# Patient Record
Sex: FEMALE | Race: WHITE | NOT HISPANIC OR LATINO | Employment: OTHER | URBAN - METROPOLITAN AREA
[De-identification: names, ages, dates, MRNs, and addresses within clinical notes are randomized per-mention and may not be internally consistent; named-entity substitution may affect disease eponyms.]

---

## 2017-01-04 ENCOUNTER — TELEPHONE (OUTPATIENT)
Dept: ENDOCRINOLOGY | Facility: CLINIC | Age: 77
End: 2017-01-04

## 2017-01-04 NOTE — TELEPHONE ENCOUNTER
Left message authorizing refill on patient medication glimepiride dispense 180 with 3 refills also informed patient that medication has been authorized

## 2017-01-09 ENCOUNTER — CLINICAL SUPPORT (OUTPATIENT)
Dept: ELECTROPHYSIOLOGY | Facility: CLINIC | Age: 77
End: 2017-01-09
Payer: MEDICARE

## 2017-01-09 DIAGNOSIS — Z95.810 ICD (IMPLANTABLE CARDIOVERTER-DEFIBRILLATOR) IN PLACE: ICD-10-CM

## 2017-01-09 DIAGNOSIS — I42.9 CARDIOMYOPATHY, PRIMARY: ICD-10-CM

## 2017-01-09 PROCEDURE — 93295 DEV INTERROG REMOTE 1/2/MLT: CPT | Mod: S$GLB,,, | Performed by: INTERNAL MEDICINE

## 2017-01-09 PROCEDURE — 93297 REM INTERROG DEV EVAL ICPMS: CPT | Mod: S$GLB,,, | Performed by: INTERNAL MEDICINE

## 2017-01-09 PROCEDURE — 93296 REM INTERROG EVL PM/IDS: CPT | Mod: S$GLB,,, | Performed by: INTERNAL MEDICINE

## 2017-01-12 DIAGNOSIS — I42.8 NONISCHEMIC CARDIOMYOPATHY: ICD-10-CM

## 2017-01-12 DIAGNOSIS — Z95.810 IMPLANTABLE CARDIOVERTER-DEFIBRILLATOR (ICD) IN SITU: ICD-10-CM

## 2017-01-12 DIAGNOSIS — I50.22 CHRONIC SYSTOLIC HEART FAILURE: ICD-10-CM

## 2017-01-12 DIAGNOSIS — Z95.810 AUTOMATIC IMPLANTABLE CARDIAC DEFIBRILLATOR IN SITU: Primary | ICD-10-CM

## 2017-01-12 DIAGNOSIS — I44.7 LBBB (LEFT BUNDLE BRANCH BLOCK): ICD-10-CM

## 2017-01-19 ENCOUNTER — LAB VISIT (OUTPATIENT)
Dept: LAB | Facility: HOSPITAL | Age: 77
End: 2017-01-19
Attending: INTERNAL MEDICINE
Payer: MEDICARE

## 2017-01-19 DIAGNOSIS — I10 ESSENTIAL HYPERTENSION: ICD-10-CM

## 2017-01-19 DIAGNOSIS — E78.5 HYPERLIPIDEMIA, UNSPECIFIED HYPERLIPIDEMIA TYPE: ICD-10-CM

## 2017-01-19 DIAGNOSIS — E04.2 NONTOXIC MULTINODULAR GOITER: ICD-10-CM

## 2017-01-19 DIAGNOSIS — M85.80 SENILE OSTEOPENIA: ICD-10-CM

## 2017-01-19 LAB
ALBUMIN SERPL BCP-MCNC: 3.7 G/DL
ALP SERPL-CCNC: 52 U/L
ALT SERPL W/O P-5'-P-CCNC: 28 U/L
ANION GAP SERPL CALC-SCNC: 7 MMOL/L
AST SERPL-CCNC: 26 U/L
BILIRUB SERPL-MCNC: 0.3 MG/DL
BUN SERPL-MCNC: 31 MG/DL
CALCIUM SERPL-MCNC: 9.6 MG/DL
CHLORIDE SERPL-SCNC: 105 MMOL/L
CO2 SERPL-SCNC: 27 MMOL/L
CREAT SERPL-MCNC: 1.2 MG/DL
EST. GFR  (AFRICAN AMERICAN): 50.4 ML/MIN/1.73 M^2
EST. GFR  (NON AFRICAN AMERICAN): 43.7 ML/MIN/1.73 M^2
GLUCOSE SERPL-MCNC: 88 MG/DL
POTASSIUM SERPL-SCNC: 4.3 MMOL/L
PROT SERPL-MCNC: 6.9 G/DL
SODIUM SERPL-SCNC: 139 MMOL/L
TSH SERPL DL<=0.005 MIU/L-ACNC: 0.74 UIU/ML

## 2017-01-19 PROCEDURE — 84443 ASSAY THYROID STIM HORMONE: CPT

## 2017-01-19 PROCEDURE — 83036 HEMOGLOBIN GLYCOSYLATED A1C: CPT

## 2017-01-19 PROCEDURE — 36415 COLL VENOUS BLD VENIPUNCTURE: CPT | Mod: PO

## 2017-01-19 PROCEDURE — 80053 COMPREHEN METABOLIC PANEL: CPT

## 2017-01-20 LAB
ESTIMATED AVG GLUCOSE: 194 MG/DL
HBA1C MFR BLD HPLC: 8.4 %

## 2017-01-26 ENCOUNTER — OFFICE VISIT (OUTPATIENT)
Dept: ENDOCRINOLOGY | Facility: CLINIC | Age: 77
End: 2017-01-26
Payer: MEDICARE

## 2017-01-26 VITALS
HEIGHT: 63 IN | DIASTOLIC BLOOD PRESSURE: 77 MMHG | WEIGHT: 165 LBS | SYSTOLIC BLOOD PRESSURE: 173 MMHG | HEART RATE: 85 BPM | BODY MASS INDEX: 29.23 KG/M2

## 2017-01-26 DIAGNOSIS — E11.65 UNCONTROLLED TYPE 2 DIABETES MELLITUS WITH OTHER NEUROLOGIC COMPLICATION, UNSPECIFIED LONG TERM INSULIN USE STATUS: ICD-10-CM

## 2017-01-26 DIAGNOSIS — E11.29 TYPE 2 DIABETES MELLITUS WITH MICROALBUMINURIA, WITH LONG-TERM CURRENT USE OF INSULIN: ICD-10-CM

## 2017-01-26 DIAGNOSIS — E78.00 PURE HYPERCHOLESTEROLEMIA: ICD-10-CM

## 2017-01-26 DIAGNOSIS — E04.2 NONTOXIC MULTINODULAR GOITER: ICD-10-CM

## 2017-01-26 DIAGNOSIS — G47.33 OSA (OBSTRUCTIVE SLEEP APNEA): ICD-10-CM

## 2017-01-26 DIAGNOSIS — I10 ESSENTIAL HYPERTENSION: ICD-10-CM

## 2017-01-26 DIAGNOSIS — Z79.4 TYPE 2 DIABETES MELLITUS WITH MICROALBUMINURIA, WITH LONG-TERM CURRENT USE OF INSULIN: ICD-10-CM

## 2017-01-26 DIAGNOSIS — R80.9 TYPE 2 DIABETES MELLITUS WITH MICROALBUMINURIA, WITH LONG-TERM CURRENT USE OF INSULIN: ICD-10-CM

## 2017-01-26 DIAGNOSIS — E11.42 DIABETIC POLYNEUROPATHY ASSOCIATED WITH TYPE 2 DIABETES MELLITUS: Primary | ICD-10-CM

## 2017-01-26 DIAGNOSIS — I50.22 CHRONIC SYSTOLIC HEART FAILURE: ICD-10-CM

## 2017-01-26 DIAGNOSIS — E11.49 UNCONTROLLED TYPE 2 DIABETES MELLITUS WITH OTHER NEUROLOGIC COMPLICATION, UNSPECIFIED LONG TERM INSULIN USE STATUS: ICD-10-CM

## 2017-01-26 DIAGNOSIS — M81.0 OSTEOPOROSIS: ICD-10-CM

## 2017-01-26 PROCEDURE — 1157F ADVNC CARE PLAN IN RCRD: CPT | Mod: S$GLB,,, | Performed by: NURSE PRACTITIONER

## 2017-01-26 PROCEDURE — 1160F RVW MEDS BY RX/DR IN RCRD: CPT | Mod: S$GLB,,, | Performed by: NURSE PRACTITIONER

## 2017-01-26 PROCEDURE — 99214 OFFICE O/P EST MOD 30 MIN: CPT | Mod: S$GLB,,, | Performed by: NURSE PRACTITIONER

## 2017-01-26 PROCEDURE — 1159F MED LIST DOCD IN RCRD: CPT | Mod: S$GLB,,, | Performed by: NURSE PRACTITIONER

## 2017-01-26 PROCEDURE — 3077F SYST BP >= 140 MM HG: CPT | Mod: S$GLB,,, | Performed by: NURSE PRACTITIONER

## 2017-01-26 PROCEDURE — 99999 PR PBB SHADOW E&M-EST. PATIENT-LVL III: CPT | Mod: PBBFAC,,, | Performed by: NURSE PRACTITIONER

## 2017-01-26 PROCEDURE — 3078F DIAST BP <80 MM HG: CPT | Mod: S$GLB,,, | Performed by: NURSE PRACTITIONER

## 2017-01-26 PROCEDURE — 1126F AMNT PAIN NOTED NONE PRSNT: CPT | Mod: S$GLB,,, | Performed by: NURSE PRACTITIONER

## 2017-01-26 RX ORDER — INSULIN GLARGINE 100 [IU]/ML
INJECTION, SOLUTION SUBCUTANEOUS
Qty: 3 BOX | Refills: 3 | Status: SHIPPED | OUTPATIENT
Start: 2017-01-26 | End: 2017-05-18 | Stop reason: SDUPTHER

## 2017-01-26 RX ORDER — LANCETS
EACH MISCELLANEOUS
Qty: 400 EACH | Refills: 3 | Status: SHIPPED | OUTPATIENT
Start: 2017-01-26 | End: 2020-12-14 | Stop reason: SDUPTHER

## 2017-01-26 RX ORDER — GLIMEPIRIDE 2 MG/1
TABLET ORAL
Qty: 180 TABLET | Refills: 3 | Status: SHIPPED | OUTPATIENT
Start: 2017-01-26 | End: 2017-10-12 | Stop reason: SDUPTHER

## 2017-01-26 RX ORDER — INSULIN PUMP SYRINGE, 3 ML
EACH MISCELLANEOUS
Qty: 1 EACH | Refills: 0 | Status: SHIPPED | OUTPATIENT
Start: 2017-01-26 | End: 2018-01-26

## 2017-01-26 NOTE — PROGRESS NOTES
"CC: Patient is here for management of Type 2 diabetes and review of medical conditions as listed in visit diagnosis.      HPI: Mrs. Myesha Quinones is a 76 y.o. White female who was diagnosed with Type 2 DM in 1992. She has a FH of diabetes, reporting her 3 children all have diabetes. No hospitalizations for DM. Reports she went into the coverage gap in August 2016 and at that time the cost of Tradjenta was $1,300, but she was able to obtain the medication through patient assistance.     CURRENT DIABETIC MEDS: Lantus 46 units QHS, Tradjenta 5 mg daily, Glimepiride 2 mg bid, Metformin 500 mg with breakfast, 500 mg with lunch, 1,000 PM with dinner     She arrives today as a new patient to me. She was previously seen with Dr. Emmanuel. A review of her logs provided show her readings are monitored 3x/day as noted below:   AM:   Lunch: 153-164  Dinner: 108-170    No hypoglycemia. States BG have improved in the past couple of weeks.     No missed doses of diabetes medications.     Active with yardwork and states she "tries to walk". Snacks between meals on walnuts or raul crackers at times.     Last Podiatry Exam: N/A    REVIEW OF SYSTEMS  General: no weakness, fatigue, or weight changes.   Eyes: no double or blurred vision, eye pain, or redness; last eye exam=October 18, 2016 with Dr. Young.   Cardiovascular: no chest pain, palpitations, edema, or murmurs.   Respiratory: no cough or dyspnea.   GI: no heartburn, nausea, or changes in bowel patterns; good appetite.   Skin: no rashes, dryness, itching, or reactions at insulin injection sites.   Neuro: no numbness, tingling, tremors, or vertigo.   Endocrine: no polyuria, polydipsia, polyphagia, heat or cold intolerance.     Vital Signs  Visit Vitals    BP (!) 173/77 (BP Location: Left arm, Patient Position: Sitting)    Pulse 85    Ht 5' 3" (1.6 m)    Wt 74.8 kg (165 lb)    BMI 29.23 kg/m2       Hemoglobin A1C   Date Value Ref Range Status   01/19/2017 8.4 " (H) 4.5 - 6.2 % Final     Comment:     According to ADA guidelines, hemoglobin A1C <7.0% represents  optimal control in non-pregnant diabetic patients.  Different  metrics may apply to specific populations.   Standards of Medical Care in Diabetes - 2016.  For the purpose of screening for the presence of diabetes:  <5.7%     Consistent with the absence of diabetes  5.7-6.4%  Consistent with increasing risk for diabetes   (prediabetes)  >or=6.5%  Consistent with diabetes  Currently no consensus exists for use of hemoglobin A1C  for diagnosis of diabetes for children.     11/07/2016 7.7 (H) 4.5 - 6.2 % Final     Comment:     According to ADA guidelines, hemoglobin A1C <7.0% represents  optimal control in non-pregnant diabetic patients.  Different  metrics may apply to specific populations.   Standards of Medical Care in Diabetes - 2016.  For the purpose of screening for the presence of diabetes:  <5.7%     Consistent with the absence of diabetes  5.7-6.4%  Consistent with increasing risk for diabetes   (prediabetes)  >or=6.5%  Consistent with diabetes  Currently no consensus exists for use of hemoglobin A1C  for diagnosis of diabetes for children.     09/16/2016 8.4 (H) 4.5 - 6.2 % Final     Comment:     According to ADA guidelines, hemoglobin A1C <7.0% represents  optimal control in non-pregnant diabetic patients.  Different  metrics may apply to specific populations.   Standards of Medical Care in Diabetes - 2016.  For the purpose of screening for the presence of diabetes:  <5.7%     Consistent with the absence of diabetes  5.7-6.4%  Consistent with increasing risk for diabetes   (prediabetes)  >or=6.5%  Consistent with diabetes  Currently no consensus exists for use of hemoglobin A1C  for diagnosis of diabetes for children.        Lab Results   Component Value Date    CREATININE 1.2 01/19/2017      Lab Results   Component Value Date    TSH 0.739 01/19/2017      Lab Results   Component Value Date    LDLCALC 58.6 (L)  07/14/2016        PHYSICAL EXAMINATION  Constitutional: Appears well, no distress  Neck: Supple, trachea midline.   Respiratory: CTA without wheezes, even and unlabored.  Cardiovascular: RRR; no carotid bruits or murmurs.   Lymph: DP pulses  2+ bilaterally; no edema.   Skin: warm and dry; no injection site reactions, no acanthosis nigracans observed.  Neuro:patient alert and cooperative, normal affect.    Diabetes Foot Exam:   Protective Sensation (w/ 10 gram monofilament and tuning fork):  Right: Decreased  Left: Decreased    Visual Inspection:  Normal -  Bilateral, Nails Intact - without Evidence of Foot Deformity- Bilateral, Callus -  Neither and no interdigital maceration.     Pedal Pulses (DP):   Right: Present  Left: Present    Assessment/Plan    1. Diabetic polyneuropathy associated with type 2 diabetes mellitus: DM improving. Continue current medications. Monitor BG 3x/day. Logs to next visit. Informed patient that since her readings have improved in the past couple of weeks, her A1C will most likely improve with the next visit.    2. Type 2 diabetes mellitus with microalbuminuria, with long-term current use of insulin: Improve diabetes control. Monitor urine microalbumin yearly.    3. Chronic systolic heart failure: Managed by Cardiology.    4. Osteoporosis: Continue Vitamin D 2,000 iu daily.     5. Pure hypercholesterolemia: Continue Zocor, Fish Oil.     6. Essential hypertension: Continue Coreg, Apresoline, Diovan.    7. Nontoxic multinodular goiter: Monitoring yearly. Previously had FNA of left thyroid nodule 6/23/16, right thyroid nodule 4/2014. Clinically euthyroid.    8. KHOA (obstructive sleep apnea): Continue nightly c-pap use.      FOLLOW UP  Return in about 4 months (around 5/26/2017).     Orders Placed This Encounter   Procedures    Lipid panel     Standing Status:   Future     Standing Expiration Date:   1/26/2018    Hemoglobin A1c     Standing Status:   Future     Standing Expiration Date:    1/26/2018

## 2017-01-26 NOTE — MR AVS SNAPSHOT
[facility] - Endocrinology  [address]  [facility] [address]  Phone: [phone]                  Myesha Quinones   2017 9:30 AM   Office Visit    Description:  Female : 1940   Provider:  TRICIA Trujillo,ANP-C   Department:  [facility] - Endocrinology           Reason for Visit     Goiter           Diagnoses this Visit        Comments    Diabetic polyneuropathy associated with type 2 diabetes mellitus    -  Primary     Type 2 diabetes mellitus with microalbuminuria, with long-term current use of insulin         Chronic systolic heart failure         Osteoporosis         Pure hypercholesterolemia         Essential hypertension         Nontoxic multinodular goiter         KHOA (obstructive sleep apnea)         Uncontrolled type 2 diabetes mellitus with other neurologic complication, unspecified long term insulin use status                To Do List           Future Appointments        Provider Department Dept Phone    2/3/2017 7:00 AM LAB, K[facility] [phone]    2017 8:00 AM Emelina Gaston MD [facility] - Internal Medicine [phone]    2017 1:00 PM [facility] MAMMO3 DX [facility] [phone]    2017 2:30 PM LAB, HEMONC CANCER BLDG [facility] [phone]    2017 3:30 PM Sindi Jaime MD [place] - Hematology Oncology [phone]      Goals (5 Years of Data)     None      Follow-Up and Disposition     Return in about 4 months (around 2017).       These Medications        Disp Refills Start End    lancets (ACCU-CHEK SOFTCLIX LANCETS) Misc 400 each 3 2017     Uses Accu-Chek Angelica meter to test BG 4x/day    Pharmacy: [facility] - [address] Ph #: [phone]       blood sugar diagnostic (ACCU-CHEK ANGELICA) Strp 400 strip 3 2017     Uses Accu-Check Angelica meter to test BG 4x/day    Pharmacy: [facility] - [facility]  [place] - [address] [facility] Ph #: [phone]       blood-glucose meter kit 1 each 0 1/26/2017 1/26/2018    Dispense Accu-Chek Hector meter. Use as instructed    Pharmacy: [facility] - [place], [place] - [address] [facility] Ph #: [phone]       insulin glargine (LANTUS SOLOSTAR) 100 unit/mL (3 mL) InPn pen 3 Box 3 1/26/2017     INJECT 46 UNITS INTO THE SKIN EVERY EVENING.    Pharmacy: [id number] - [place]  [address] AT [facility] Ph #: [phone]       glimepiride (AMARYL) 2 MG tablet 180 tablet 3 1/26/2017     TAKE 1 TABLET BY MOUTH TWICE A DAY    Pharmacy: [id number] - [place] - [address] AT [facility] Ph #: [phone]       Notes to Pharmacy: Update refills      [facility]     [facility] Care Line - 24/7 Assistance  Registered nurses in the [facility] provide clinical advisement, health education, appointment booking, and other advisory services.  Call for this free service at [phone].             Medications           Message regarding Medications     Verify the changes and/or additions to your medication regime listed below are the same as discussed with your clinician today.  If any of these changes or additions are incorrect, please notify your healthcare provider.        START taking these NEW medications        Refills    lancets (ACCU-CHEK SOFTCLIX LANCETS) Misc 3    Sig: Uses Accu-Chek Hector meter to test BG 4x/day    Class: Normal    blood sugar diagnostic (ACCU-CHEK HECTOR) Strp 3    Sig: Uses Accu-Check Hector meter to test BG 4x/day    Class: Normal    blood-glucose meter kit 0    Sig: Dispense Accu-Chek Hector meter. Use as instructed    Class: Normal           Verify that the below list of medications is an accurate representation of the medications you are currently taking.  If none reported, the list may be blank. If incorrect, please contact your healthcare provider.  "Carry this list with you in case of emergency.           Current Medications     albuterol (PROVENTIL) 2.5 mg /3 mL (0.083 %) nebulizer solution Take 3 mLs (2.5 mg total) by nebulization every 6 (six) hours as needed for Wheezing.    aspirin (ENTERIC COATED ASPIRIN) 81 MG EC tablet Take 1 tablet by mouth.    azelastine (ASTELIN) 137 mcg (0.1 %) nasal spray 1 spray (137 mcg total) by Nasal route 2 (two) times daily. For severe allergy    blood sugar diagnostic (ONETOUCH ULTRA TEST) Strp To use as directed with One Touch Ultra Meter and monitor blood sugars TID    carvedilol (COREG) 25 MG tablet TAKE 2 TABLETS BY MOUTH TWICE A DAY    chlorthalidone (HYGROTEN) 25 MG Tab Take 1 tablet (25 mg total) by mouth once daily.    fluticasone-salmeterol 250-50 mcg/dose (ADVAIR) 250-50 mcg/dose diskus inhaler Inhale 1 puff into the lungs 2 (two) times daily.    glimepiride (AMARYL) 2 MG tablet TAKE 1 TABLET BY MOUTH TWICE A DAY    hydrALAZINE (APRESOLINE) 50 MG tablet TAKE 1 TABLET (50 MG TOTAL) BY MOUTH 3 (THREE) TIMES DAILY.    insulin glargine (LANTUS SOLOSTAR) 100 unit/mL (3 mL) InPn pen INJECT 46 UNITS INTO THE SKIN EVERY EVENING.    linagliptin (TRADJENTA) 5 mg Tab tablet Take 1 tablet (5 mg total) by mouth once daily.    metformin (GLUCOPHAGE) 500 MG tablet Take 2 tablets (1,000 mg total) by mouth 2 (two) times daily with meals.    nitroGLYCERIN (NITROSTAT) 0.4 MG SL tablet Place 1 tablet under the tongue continuous prn.      omega-3 fatty acids (FISH OIL) 500 mg Cap Take 1 capsule by mouth Twice daily.    pen needle, diabetic (BD INSULIN PEN NEEDLE UF MINI) 31 gauge x 3/16" Ndle USE WITH LANTUS AT BEDTIME    simvastatin (ZOCOR) 20 MG tablet Take 1 tablet (20 mg total) by mouth every evening.    valsartan (DIOVAN) 320 MG tablet Take 1 tablet (320 mg total) by mouth once daily.    blood sugar diagnostic (ACCU-CHEK HECTOR) Strp Uses Accu-Check Hector meter to test BG 4x/day    blood-glucose meter kit Dispense Accu-Chek Hector " "meter. Use as instructed    lancets (ACCU-CHEK SOFTCLIX LANCETS) Misc Uses Accu-Chek Angelica meter to test BG 4x/day           Clinical Reference Information           Vital Signs - Last Recorded  Most recent update: 1/26/2017  8:56 AM by Sara Figueroa MA    BP Pulse Ht Wt BMI    (!) 173/77 (BP Location: Left arm, Patient Position: Sitting) 85 5' 3" (1.6 m) 74.8 kg (165 lb) 29.23 kg/m2      Blood Pressure          Most Recent Value    BP  (!)  173/77      Allergies as of 1/26/2017     Iodine And Iodide Containing Products      Immunizations Administered on Date of Encounter - 1/26/2017     None      Orders Placed During Today's Visit     Future Labs/Procedures Expected by Expires    Hemoglobin A1c  1/26/2017 1/26/2018    Lipid panel  1/26/2017 1/26/2018      [facility] Sign-Up     Activating your [facility] account is as easy as 1-2-3!     1) Visit [email or web address], select Sign Up Now, enter this activation code and your date of birth, then select Next.  [email or web address]  Expires: 3/12/2017  9:33 AM      2) Create a username and password to use when you visit [facility] in the future and select a security question in case you lose your password and select Next.    3) Enter your e-mail address and click Sign Up!    Additional Information  If you have questions, please e-mail [email or web address] or call [phone] to talk to our [facility] staff. Remember, [facility] is NOT to be used for urgent needs. For medical emergencies, dial 911.         "

## 2017-02-03 ENCOUNTER — LAB VISIT (OUTPATIENT)
Dept: LAB | Facility: HOSPITAL | Age: 77
End: 2017-02-03
Attending: INTERNAL MEDICINE
Payer: MEDICARE

## 2017-02-03 DIAGNOSIS — I10 ESSENTIAL HYPERTENSION: ICD-10-CM

## 2017-02-03 DIAGNOSIS — E78.00 PURE HYPERCHOLESTEROLEMIA: ICD-10-CM

## 2017-02-03 LAB
ALBUMIN SERPL BCP-MCNC: 3.7 G/DL
ALP SERPL-CCNC: 55 U/L
ALT SERPL W/O P-5'-P-CCNC: 25 U/L
ANION GAP SERPL CALC-SCNC: 9 MMOL/L
AST SERPL-CCNC: 19 U/L
BASOPHILS # BLD AUTO: 0.04 K/UL
BASOPHILS NFR BLD: 0.4 %
BILIRUB SERPL-MCNC: 0.3 MG/DL
BUN SERPL-MCNC: 25 MG/DL
CALCIUM SERPL-MCNC: 9.7 MG/DL
CHLORIDE SERPL-SCNC: 104 MMOL/L
CHOLEST/HDLC SERPL: 2.8 {RATIO}
CO2 SERPL-SCNC: 25 MMOL/L
CREAT SERPL-MCNC: 1.2 MG/DL
DIFFERENTIAL METHOD: ABNORMAL
EOSINOPHIL # BLD AUTO: 0.6 K/UL
EOSINOPHIL NFR BLD: 6.2 %
ERYTHROCYTE [DISTWIDTH] IN BLOOD BY AUTOMATED COUNT: 13.3 %
EST. GFR  (AFRICAN AMERICAN): 50.4 ML/MIN/1.73 M^2
EST. GFR  (NON AFRICAN AMERICAN): 43.7 ML/MIN/1.73 M^2
GLUCOSE SERPL-MCNC: 159 MG/DL
HCT VFR BLD AUTO: 36.7 %
HDL/CHOLESTEROL RATIO: 36.1 %
HDLC SERPL-MCNC: 119 MG/DL
HDLC SERPL-MCNC: 43 MG/DL
HGB BLD-MCNC: 11.9 G/DL
LDLC SERPL CALC-MCNC: 51 MG/DL
LYMPHOCYTES # BLD AUTO: 2 K/UL
LYMPHOCYTES NFR BLD: 20.3 %
MCH RBC QN AUTO: 30.3 PG
MCHC RBC AUTO-ENTMCNC: 32.4 %
MCV RBC AUTO: 93 FL
MONOCYTES # BLD AUTO: 0.8 K/UL
MONOCYTES NFR BLD: 8.2 %
NEUTROPHILS # BLD AUTO: 6.2 K/UL
NEUTROPHILS NFR BLD: 64 %
NONHDLC SERPL-MCNC: 76 MG/DL
PLATELET # BLD AUTO: 225 K/UL
PMV BLD AUTO: 12.4 FL
POTASSIUM SERPL-SCNC: 4.5 MMOL/L
PROT SERPL-MCNC: 7 G/DL
RBC # BLD AUTO: 3.93 M/UL
SODIUM SERPL-SCNC: 138 MMOL/L
TRIGL SERPL-MCNC: 125 MG/DL
WBC # BLD AUTO: 9.74 K/UL

## 2017-02-03 PROCEDURE — 83036 HEMOGLOBIN GLYCOSYLATED A1C: CPT

## 2017-02-03 PROCEDURE — 36415 COLL VENOUS BLD VENIPUNCTURE: CPT | Mod: PO

## 2017-02-03 PROCEDURE — 85025 COMPLETE CBC W/AUTO DIFF WBC: CPT

## 2017-02-03 PROCEDURE — 80061 LIPID PANEL: CPT

## 2017-02-03 PROCEDURE — 80053 COMPREHEN METABOLIC PANEL: CPT

## 2017-02-04 LAB
ESTIMATED AVG GLUCOSE: 186 MG/DL
HBA1C MFR BLD HPLC: 8.1 %

## 2017-02-07 RX ORDER — FLUTICASONE PROPIONATE AND SALMETEROL 250; 50 UG/1; UG/1
1 POWDER RESPIRATORY (INHALATION) 2 TIMES DAILY
Qty: 60 EACH | Refills: 12 | Status: SHIPPED | OUTPATIENT
Start: 2017-02-07 | End: 2017-02-14 | Stop reason: SDUPTHER

## 2017-02-07 NOTE — TELEPHONE ENCOUNTER
----- Message from Aidan Carrillo sent at 2/7/2017  2:23 PM CST -----  Contact: Self [phone]  Type: Rx    Name of medication(s): fluticasone-salmeterol 250-50 mcg/dose (ADVAIR) 250-50 mcg/dose diskus inhaler    Is this a refill? New rx? Refill    Who prescribed medication? Dr Gaston    Pharmacy Name, Phone, & Location: [facility]    Comments:Please advice    Thanks

## 2017-02-07 NOTE — TELEPHONE ENCOUNTER
Pt called in requesting a refill of the pended medication to be sent to [facility].

## 2017-02-08 RX ORDER — CARVEDILOL 25 MG/1
TABLET ORAL
Qty: 360 TABLET | Refills: 3 | Status: SHIPPED | OUTPATIENT
Start: 2017-02-08 | End: 2018-01-26 | Stop reason: SDUPTHER

## 2017-02-14 ENCOUNTER — OFFICE VISIT (OUTPATIENT)
Dept: INTERNAL MEDICINE | Facility: CLINIC | Age: 77
End: 2017-02-14
Payer: MEDICARE

## 2017-02-14 ENCOUNTER — TELEPHONE (OUTPATIENT)
Dept: ADMINISTRATIVE | Facility: OTHER | Age: 77
End: 2017-02-14

## 2017-02-14 VITALS
HEIGHT: 63 IN | SYSTOLIC BLOOD PRESSURE: 128 MMHG | DIASTOLIC BLOOD PRESSURE: 60 MMHG | HEART RATE: 78 BPM | BODY MASS INDEX: 28.79 KG/M2 | WEIGHT: 162.5 LBS

## 2017-02-14 DIAGNOSIS — R80.9 TYPE 2 DIABETES MELLITUS WITH MICROALBUMINURIA, WITH LONG-TERM CURRENT USE OF INSULIN: Primary | ICD-10-CM

## 2017-02-14 DIAGNOSIS — I70.0 CALCIFICATION OF AORTA: ICD-10-CM

## 2017-02-14 DIAGNOSIS — Z79.4 TYPE 2 DIABETES MELLITUS WITH MICROALBUMINURIA, WITH LONG-TERM CURRENT USE OF INSULIN: Primary | ICD-10-CM

## 2017-02-14 DIAGNOSIS — I10 ESSENTIAL HYPERTENSION: ICD-10-CM

## 2017-02-14 DIAGNOSIS — D32.9 MENINGIOMA: ICD-10-CM

## 2017-02-14 DIAGNOSIS — E27.8 ADRENAL CORTICAL NODULE: ICD-10-CM

## 2017-02-14 DIAGNOSIS — E78.00 PURE HYPERCHOLESTEROLEMIA: ICD-10-CM

## 2017-02-14 DIAGNOSIS — N18.30 CHRONIC KIDNEY DISEASE, STAGE 3, MOD DECREASED GFR: ICD-10-CM

## 2017-02-14 DIAGNOSIS — E11.29 TYPE 2 DIABETES MELLITUS WITH MICROALBUMINURIA, WITH LONG-TERM CURRENT USE OF INSULIN: Primary | ICD-10-CM

## 2017-02-14 PROCEDURE — 99499 UNLISTED E&M SERVICE: CPT | Mod: S$GLB,,, | Performed by: INTERNAL MEDICINE

## 2017-02-14 PROCEDURE — 1160F RVW MEDS BY RX/DR IN RCRD: CPT | Mod: S$GLB,,, | Performed by: INTERNAL MEDICINE

## 2017-02-14 PROCEDURE — 1126F AMNT PAIN NOTED NONE PRSNT: CPT | Mod: S$GLB,,, | Performed by: INTERNAL MEDICINE

## 2017-02-14 PROCEDURE — 3074F SYST BP LT 130 MM HG: CPT | Mod: S$GLB,,, | Performed by: INTERNAL MEDICINE

## 2017-02-14 PROCEDURE — 3078F DIAST BP <80 MM HG: CPT | Mod: S$GLB,,, | Performed by: INTERNAL MEDICINE

## 2017-02-14 PROCEDURE — 1157F ADVNC CARE PLAN IN RCRD: CPT | Mod: S$GLB,,, | Performed by: INTERNAL MEDICINE

## 2017-02-14 PROCEDURE — 1159F MED LIST DOCD IN RCRD: CPT | Mod: S$GLB,,, | Performed by: INTERNAL MEDICINE

## 2017-02-14 PROCEDURE — 99214 OFFICE O/P EST MOD 30 MIN: CPT | Mod: S$GLB,,, | Performed by: INTERNAL MEDICINE

## 2017-02-14 PROCEDURE — 99999 PR PBB SHADOW E&M-EST. PATIENT-LVL III: CPT | Mod: PBBFAC,,, | Performed by: INTERNAL MEDICINE

## 2017-02-14 RX ORDER — FLUTICASONE PROPIONATE AND SALMETEROL 250; 50 UG/1; UG/1
1 POWDER RESPIRATORY (INHALATION) 2 TIMES DAILY
Qty: 180 EACH | Refills: 4 | Status: SHIPPED | OUTPATIENT
Start: 2017-02-14 | End: 2017-05-24 | Stop reason: SDUPTHER

## 2017-02-14 NOTE — TELEPHONE ENCOUNTER
----- Message from Chantell Louie sent at 2/14/2017  1:34 PM CST -----      ----- Message -----     From: Annamarie Rodriguez     Sent: 2/14/2017   1:31 PM       To: [facility]UT[facility] Staff    Patient would like to reschedule her appointments. Says her  just had surgery and she won't be able to leave him home. She can be reached at [phone].

## 2017-02-14 NOTE — PROGRESS NOTES
"Subjective:       Patient ID: Myesha Quinones is a 76 y.o. female.    Chief Complaint: Follow-up    HPI   Patient last visit was November.9/2016 1/26/2017 Endocrine: Ms. Kemp  Assessment/Plan     1. Diabetic polyneuropathy associated with type 2 diabetes mellitus: DM improving. Continue current medications. Monitor BG 3x/day. Logs to next visit. Informed patient that since her readings have improved in the past couple of weeks, her A1C will most likely improve with the next visit.    2. Type 2 diabetes mellitus with microalbuminuria, with long-term current use of insulin: Improve diabetes control. Monitor urine microalbumin yearly.    3. Chronic systolic heart failure: Managed by Cardiology.    4. Osteoporosis: Continue Vitamin D 2,000 iu daily.     5. Pure hypercholesterolemia: Continue Zocor, Fish Oil.     6. Essential hypertension: Continue Coreg, Apresoline, Diovan.    7. Nontoxic multinodular goiter: Monitoring yearly. Previously had FNA of left thyroid nodule 6/23/16, right thyroid nodule 4/2014. Clinically euthyroid.    8. KHOA (obstructive sleep apnea): Continue nightly c-pap use.       FOLLOW UP  Return in about 4 months (around 5/26/2017).     Diabetes type 2  medication compliance: compliant all of the time, diabetic diet compliance: compliant most of the time, home glucose monitoring: is performed regularly  Visit Vitals    BP (!) 164/80    Pulse 78    Ht 5' 3" (1.6 m)    Wt 73.7 kg (162 lb 7.7 oz)    BMI 28.78 kg/m2   ,   Hemoglobin A1C   Date Value Ref Range Status   02/03/2017 8.1 (H) 4.5 - 6.2 % Final     Comment:     According to ADA guidelines, hemoglobin A1C <7.0% represents  optimal control in non-pregnant diabetic patients.  Different  metrics may apply to specific populations.   Standards of Medical Care in Diabetes - 2016.  For the purpose of screening for the presence of diabetes:  <5.7%     Consistent with the absence of diabetes  5.7-6.4%  Consistent with increasing risk for " diabetes   (prediabetes)  >or=6.5%  Consistent with diabetes  Currently no consensus exists for use of hemoglobin A1C  for diagnosis of diabetes for children.     01/19/2017 8.4 (H) 4.5 - 6.2 % Final     Comment:     According to ADA guidelines, hemoglobin A1C <7.0% represents  optimal control in non-pregnant diabetic patients.  Different  metrics may apply to specific populations.   Standards of Medical Care in Diabetes - 2016.  For the purpose of screening for the presence of diabetes:  <5.7%     Consistent with the absence of diabetes  5.7-6.4%  Consistent with increasing risk for diabetes   (prediabetes)  >or=6.5%  Consistent with diabetes  Currently no consensus exists for use of hemoglobin A1C  for diagnosis of diabetes for children.     11/07/2016 7.7 (H) 4.5 - 6.2 % Final     Comment:     According to ADA guidelines, hemoglobin A1C <7.0% represents  optimal control in non-pregnant diabetic patients.  Different  metrics may apply to specific populations.   Standards of Medical Care in Diabetes - 2016.  For the purpose of screening for the presence of diabetes:  <5.7%     Consistent with the absence of diabetes  5.7-6.4%  Consistent with increasing risk for diabetes   (prediabetes)  >or=6.5%  Consistent with diabetes  Currently no consensus exists for use of hemoglobin A1C  for diagnosis of diabetes for children.     ,   Creatinine   Date Value Ref Range Status   02/03/2017 1.2 0.5 - 1.4 mg/dL Final   01/19/2017 1.2 0.5 - 1.4 mg/dL Final   11/08/2016 1.0 0.5 - 1.4 mg/dL Final       Lab Results   Component Value Date    LDLCALC 51.0 (L) 02/03/2017    LDLCALC 58.6 (L) 07/14/2016    LDLCALC 46.0 (L) 05/12/2016                  Review of Systems   Constitutional: Negative for chills, fever and unexpected weight change.   HENT: Negative for trouble swallowing.    Respiratory: Positive for cough. Negative for shortness of breath and wheezing.         Better but has continued   Cardiovascular: Negative for chest pain  and palpitations.   Gastrointestinal: Negative for abdominal distention, abdominal pain, blood in stool and vomiting.   Musculoskeletal: Negative for back pain.       Objective:      Physical Exam   Constitutional: She is oriented to person, place, and time. She appears well-developed and well-nourished. No distress.   Neck: Carotid bruit is not present. No thyromegaly present.   Cardiovascular: Normal rate, regular rhythm and normal heart sounds.  PMI is not displaced.    Pulmonary/Chest: Effort normal. No respiratory distress. She has wheezes.   Abdominal: Soft. Bowel sounds are normal. She exhibits no distension. There is no tenderness.   Musculoskeletal: She exhibits no edema.   Neurological: She is alert and oriented to person, place, and time.       Assessment:       1. Type 2 diabetes mellitus with microalbuminuria, with long-term current use of insulin    2. Adrenal cortical nodule: repeat CT 6/2016    3. Meningioma    4. Calcification of aorta    5. Pure hypercholesterolemia    6. Essential hypertension    7. Chronic kidney disease, stage 3, mod decreased GFR        Plan:       Myesha was seen today for follow-up.    Diagnoses and all orders for this visit:    Type 2 diabetes mellitus with microalbuminuria, with long-term current use of insulin: A1c has increased slightly, I have read the endocrine notes expecting that this will improve with the next drawn 3 months    Adrenal cortical nodule: repeat CT 6/2016: Stable    Meningioma: Stable    Calcification of aorta: On statin and aspirin    Pure hypercholesterolemia: On statin    Essential hypertension: Well controlled after patient took her medication.  The first blood pressure was actually taken an hour after she was seen and when I repeated it it was within normal limits    Chronic kidney disease, stage 3, mod decreased GFR: Stable    Other orders  -     fluticasone-salmeterol 250-50 mcg/dose (ADVAIR) 250-50 mcg/dose diskus inhaler; Inhale 1 puff into  the lungs 2 (two) times daily. This is a change from one month      Return in about 3 months (around 5/14/2017) for EPR cbc, cmp, link to 5/11/lab  ( appt made).    New Prescriptions    No medications on file       Modified Medications    Modified Medication Previous Medication    FLUTICASONE-SALMETEROL 250-50 MCG/DOSE (ADVAIR) 250-50 MCG/DOSE DISKUS INHALER fluticasone-salmeterol 250-50 mcg/dose (ADVAIR) 250-50 mcg/dose diskus inhaler       Inhale 1 puff into the lungs 2 (two) times daily. This is a change from one month    Inhale 1 puff into the lungs 2 (two) times daily.       Orders Placed This Encounter   Procedures    CBC auto differential     Standing Status:   Future     Standing Expiration Date:   6/14/2017    Comprehensive metabolic panel     Standing Status:   Future     Standing Expiration Date:   6/14/2017    Hemoglobin A1c     Standing Status:   Future     Standing Expiration Date:   6/14/2017       Labs, studies and consults associated with this visit were reviewed

## 2017-02-14 NOTE — MR AVS SNAPSHOT
[facility] - Internal Medicine  [address]  [place] [address]  Phone: [phone]  Fax: [phone]                  Myesha Quinones   2017 8:00 AM   Office Visit    Description:  Female : 1940   Provider:  Emelina Gaston MD   Department:  [facility] - Internal Medicine           Reason for Visit     Follow-up           Diagnoses this Visit        Comments    Type 2 diabetes mellitus with microalbuminuria, with long-term current use of insulin    -  Primary     Adrenal cortical nodule         Meningioma         Calcification of aorta         Pure hypercholesterolemia         Essential hypertension         Chronic kidney disease, stage 3, mod decreased GFR                To Do List           Future Appointments        Provider Department Dept Phone    2017 1:00 PM [facility] MAMMO3 DX [facility] [phone]    2017 2:30 PM LAB, HEMONC CANCER BLDG [facility] [phone]    2017 3:30 PM Sindi Jaime MD [place] - Hematology Oncology [phone]    3/15/2017 2:20 PM Jan Mckeon MD [facility] Arrhythmia [phone]    2017 1:40 PM PACEMAKER, ICD [facility] Arrhythmia [phone]      Goals (5 Years of Data)     None      Follow-Up and Disposition     Return in about 3 months (around 2017) for EPR cbc, cmp, link to /lab  ( appt made).       These Medications        Disp Refills Start End    fluticasone-salmeterol 250-50 mcg/dose (ADVAIR) 250-50 mcg/dose diskus inhaler 180 each 4 2017     Inhale 1 puff into the lungs 2 (two) times daily. This is a change from one month - Inhalation    Pharmacy: [id number] - [place] - [address] AT [facility] #: [phone]         [facility]     [facility] On Call Nurse Care Line -  Assistance  Registered nurses in the [facility] On Call Center provide clinical advisement, health education, appointment booking, and other  advisory services.  Call for this free service at [phone].             Medications           Message regarding Medications     Verify the changes and/or additions to your medication regime listed below are the same as discussed with your clinician today.  If any of these changes or additions are incorrect, please notify your healthcare provider.        CHANGE how you are taking these medications     Start Taking Instead of    fluticasone-salmeterol 250-50 mcg/dose (ADVAIR) 250-50 mcg/dose diskus inhaler fluticasone-salmeterol 250-50 mcg/dose (ADVAIR) 250-50 mcg/dose diskus inhaler    Dosage:  Inhale 1 puff into the lungs 2 (two) times daily. This is a change from one month Dosage:  Inhale 1 puff into the lungs 2 (two) times daily.    Reason for Change:  Reorder            Verify that the below list of medications is an accurate representation of the medications you are currently taking.  If none reported, the list may be blank. If incorrect, please contact your healthcare provider. Carry this list with you in case of emergency.           Current Medications     albuterol (PROVENTIL) 2.5 mg /3 mL (0.083 %) nebulizer solution Take 3 mLs (2.5 mg total) by nebulization every 6 (six) hours as needed for Wheezing.    aspirin (ENTERIC COATED ASPIRIN) 81 MG EC tablet Take 1 tablet by mouth.    azelastine (ASTELIN) 137 mcg (0.1 %) nasal spray 1 spray (137 mcg total) by Nasal route 2 (two) times daily. For severe allergy    blood sugar diagnostic (ACCU-CHEK HECTOR) Strp Uses Accu-Check Hector meter to test BG 4x/day    blood sugar diagnostic (ONETOUCH ULTRA TEST) Strp To use as directed with One Touch Ultra Meter and monitor blood sugars TID    blood-glucose meter kit Dispense Accu-Chek Hector meter. Use as instructed    carvedilol (COREG) 25 MG tablet TAKE 2 TABLETS BY MOUTH TWICE A DAY    chlorthalidone (HYGROTEN) 25 MG Tab Take 1 tablet (25 mg total) by mouth once daily.    fluticasone-salmeterol 250-50 mcg/dose  "(ADVAIR) 250-50 mcg/dose diskus inhaler Inhale 1 puff into the lungs 2 (two) times daily. This is a change from one month    glimepiride (AMARYL) 2 MG tablet TAKE 1 TABLET BY MOUTH TWICE A DAY    hydrALAZINE (APRESOLINE) 50 MG tablet TAKE 1 TABLET (50 MG TOTAL) BY MOUTH 3 (THREE) TIMES DAILY.    insulin glargine (LANTUS SOLOSTAR) 100 unit/mL (3 mL) InPn pen INJECT 46 UNITS INTO THE SKIN EVERY EVENING.    lancets (ACCU-CHEK SOFTCLIX LANCETS) Misc Uses Accu-Chek Angelica meter to test BG 4x/day    linagliptin (TRADJENTA) 5 mg Tab tablet Take 1 tablet (5 mg total) by mouth once daily.    metformin (GLUCOPHAGE) 500 MG tablet Take 2 tablets (1,000 mg total) by mouth 2 (two) times daily with meals.    nitroGLYCERIN (NITROSTAT) 0.4 MG SL tablet Place 1 tablet under the tongue continuous prn.      omega-3 fatty acids (FISH OIL) 500 mg Cap Take 1 capsule by mouth Twice daily.    pen needle, diabetic (BD INSULIN PEN NEEDLE UF MINI) 31 gauge x 3/16" Ndle USE WITH LANTUS AT BEDTIME    simvastatin (ZOCOR) 20 MG tablet Take 1 tablet (20 mg total) by mouth every evening.    valsartan (DIOVAN) 320 MG tablet Take 1 tablet (320 mg total) by mouth once daily.           Clinical Reference Information           Your Vitals Were     BP Pulse Height Weight BMI    128/60 78 5' 3" (1.6 m) 73.7 kg (162 lb 7.7 oz) 28.78 kg/m2      Blood Pressure          Most Recent Value    BP  128/60 [patient took blood pressure medication one hour ago when carlton]      Allergies as of 2/14/2017     Iodine And Iodide Containing Products      Immunizations Administered on Date of Encounter - 2/14/2017     None      Orders Placed During Today's Visit     Future Labs/Procedures Expected by Expires    CBC auto differential  5/1/2017 (Approximate) 6/14/2017    Comprehensive metabolic panel  5/1/2017 (Approximate) 6/14/2017    Hemoglobin A1c  5/1/2017 (Approximate) 6/14/2017      [facility] Sign-Up     Activating your [email or web address] account is as easy as 1-2-3!     1) Visit " [email or web address], select Sign Up Now, enter this activation code and your date of birth, then select Next.  [email or web address]  Expires: 3/12/2017  9:33 AM      2) Create a username and password to use when you visit [facility] in the future and select a security question in case you lose your password and select Next.    3) Enter your e-mail address and click Sign Up!    Additional Information  If you have questions, please e-mail [email or web address] or call [phone] to talk to our [facility] staff. Remember, [facility] is NOT to be used for urgent needs. For medical emergencies, dial 911.         Language Assistance Services     ATTENTION: Language assistance services are available, free of charge. Please call [phone].      ATENCIÓN: Si habla español, tiene a frye disposición servicios gratuitos de asistencia lingüística. Llame al [phone].     CHÚ Ý: N?u b?n nói Ti?ng Vi?t, có các d?ch v? h? tr? ngôn ng? mi?n phí dành cho b?n. G?i s? [phone].         [facility] complies with applicable Federal civil rights laws and does not discriminate on the basis of race, color, national origin, age, disability, or sex.

## 2017-03-09 ENCOUNTER — OFFICE VISIT (OUTPATIENT)
Dept: CARDIOLOGY | Facility: CLINIC | Age: 77
End: 2017-03-09
Payer: MEDICARE

## 2017-03-09 VITALS
HEART RATE: 78 BPM | DIASTOLIC BLOOD PRESSURE: 79 MMHG | SYSTOLIC BLOOD PRESSURE: 130 MMHG | WEIGHT: 172.19 LBS | BODY MASS INDEX: 30.51 KG/M2 | HEIGHT: 63 IN

## 2017-03-09 DIAGNOSIS — I42.8 NONISCHEMIC CARDIOMYOPATHY: Primary | ICD-10-CM

## 2017-03-09 DIAGNOSIS — G47.33 OSA (OBSTRUCTIVE SLEEP APNEA): ICD-10-CM

## 2017-03-09 DIAGNOSIS — Z79.4 TYPE 2 DIABETES MELLITUS WITH MICROALBUMINURIA, WITH LONG-TERM CURRENT USE OF INSULIN: ICD-10-CM

## 2017-03-09 DIAGNOSIS — J45.50 ASTHMA, CHRONIC, SEVERE PERSISTENT, UNCOMPLICATED: ICD-10-CM

## 2017-03-09 DIAGNOSIS — E27.8 ADRENAL CORTICAL NODULE: ICD-10-CM

## 2017-03-09 DIAGNOSIS — I50.22 CHRONIC SYSTOLIC HEART FAILURE: ICD-10-CM

## 2017-03-09 DIAGNOSIS — E78.00 PURE HYPERCHOLESTEROLEMIA: ICD-10-CM

## 2017-03-09 DIAGNOSIS — Z95.810 BIVENTRICULAR ICD (IMPLANTABLE CARDIOVERTER-DEFIBRILLATOR) IN PLACE: ICD-10-CM

## 2017-03-09 DIAGNOSIS — R80.9 TYPE 2 DIABETES MELLITUS WITH MICROALBUMINURIA, WITH LONG-TERM CURRENT USE OF INSULIN: ICD-10-CM

## 2017-03-09 DIAGNOSIS — I10 ESSENTIAL HYPERTENSION: ICD-10-CM

## 2017-03-09 DIAGNOSIS — I25.10 CORONARY ARTERY DISEASE INVOLVING NATIVE CORONARY ARTERY OF NATIVE HEART WITHOUT ANGINA PECTORIS: ICD-10-CM

## 2017-03-09 DIAGNOSIS — E11.29 TYPE 2 DIABETES MELLITUS WITH MICROALBUMINURIA, WITH LONG-TERM CURRENT USE OF INSULIN: ICD-10-CM

## 2017-03-09 PROCEDURE — 1157F ADVNC CARE PLAN IN RCRD: CPT | Mod: S$GLB,,, | Performed by: INTERNAL MEDICINE

## 2017-03-09 PROCEDURE — 3075F SYST BP GE 130 - 139MM HG: CPT | Mod: S$GLB,,, | Performed by: INTERNAL MEDICINE

## 2017-03-09 PROCEDURE — 99999 PR PBB SHADOW E&M-EST. PATIENT-LVL III: CPT | Mod: PBBFAC,,, | Performed by: INTERNAL MEDICINE

## 2017-03-09 PROCEDURE — 3078F DIAST BP <80 MM HG: CPT | Mod: S$GLB,,, | Performed by: INTERNAL MEDICINE

## 2017-03-09 PROCEDURE — 1159F MED LIST DOCD IN RCRD: CPT | Mod: S$GLB,,, | Performed by: INTERNAL MEDICINE

## 2017-03-09 PROCEDURE — 99499 UNLISTED E&M SERVICE: CPT | Mod: S$GLB,,, | Performed by: NURSE PRACTITIONER

## 2017-03-09 PROCEDURE — 1160F RVW MEDS BY RX/DR IN RCRD: CPT | Mod: S$GLB,,, | Performed by: INTERNAL MEDICINE

## 2017-03-09 PROCEDURE — 1126F AMNT PAIN NOTED NONE PRSNT: CPT | Mod: S$GLB,,, | Performed by: INTERNAL MEDICINE

## 2017-03-09 PROCEDURE — 99215 OFFICE O/P EST HI 40 MIN: CPT | Mod: S$GLB,,, | Performed by: INTERNAL MEDICINE

## 2017-03-09 RX ORDER — HYDRALAZINE HYDROCHLORIDE 100 MG/1
100 TABLET, FILM COATED ORAL 3 TIMES DAILY
Qty: 90 TABLET | Refills: 11 | Status: SHIPPED | OUTPATIENT
Start: 2017-03-09 | End: 2018-03-22 | Stop reason: SDUPTHER

## 2017-03-09 NOTE — PROGRESS NOTES
Ms. Quinones is a new patient of Dr. Fernando and was last seen in [facility] Cardiology by Dr. Staples on 7/28/16.      Subjective:   Patient ID:  Myesha Quinones is a 76 y.o. female who presents for follow-up of Cardiomyopathy    HPI  Ms. Quinones is a 76 y.o. caucasion female who is transferring care from Dr. Staples to Dr. Fernando.  She has a significant PMH of non-ischemic cardiomyopathy, HTN, DM II, thyroid nodule, hyperlipidemia, and COPD, adrenal nodules. She has an ICD and is followed in the She had a NM stress test in 11/2016 while in the hospital for CHF and her EF at the time was 51%.  EP clinic by Dr. Mckeon. She will see Dr. Mckeon next week. Hypertension is sub-optimally controlled. Ideally, her blood pressure would be <130/80 given her co-morbidities. She reports home blood pressure readings of 130-140s/60-70s. Reports having swelling with Norvasc in the past.  She is currently taking low intensity statin therapy and reluctant to change therapy. LDL <100. With DM II, would prefer to have a goal of LDL <70. She is not eating a low salt heart healthy diet. She is living a largely sedentary lifestyle and not exercising. She is obese with significant central adiposity. Diabetes and thyroid management is done by Endocrinology. A1C goal ideally would be <7.  Chronic Renal insufficiency. She is currently taking a thiazide diuretic. Last eGFR 43.7 and CRT 1.2    Review of Systems   Constitution: Negative for decreased appetite, diaphoresis, weakness, malaise/fatigue, weight gain and weight loss.   HENT: Negative for headaches.    Eyes: Negative for visual disturbance.   Cardiovascular: Negative for chest pain, claudication, dyspnea on exertion, irregular heartbeat, leg swelling, near-syncope, orthopnea, palpitations, paroxysmal nocturnal dyspnea and syncope.        Denies chest pressure   Respiratory: Negative for cough, hemoptysis, shortness of breath, sleep disturbances due to breathing and snoring.    Endocrine:  Negative for cold intolerance and heat intolerance.   Hematologic/Lymphatic: Negative for bleeding problem. Does not bruise/bleed easily.   Musculoskeletal: Negative for myalgias.   Gastrointestinal: Negative for bloating, abdominal pain, anorexia, change in bowel habit, constipation, diarrhea, nausea and vomiting.   Neurological: Negative for difficulty with concentration, disturbances in coordination, excessive daytime sleepiness, dizziness, light-headedness, loss of balance and numbness.   Psychiatric/Behavioral: The patient does not have insomnia.      Allergies and current medications updated and reviewed:  Review of patient's allergies indicates:   Allergen Reactions    Iodine and iodide containing products Other (See Comments)     Current Outpatient Prescriptions   Medication Sig    aspirin (ENTERIC COATED ASPIRIN) 81 MG EC tablet Take 1 tablet by mouth.    azelastine (ASTELIN) 137 mcg (0.1 %) nasal spray 1 spray (137 mcg total) by Nasal route 2 (two) times daily. For severe allergy    blood sugar diagnostic (ACCU-CHEK HECTOR) Strp Uses Accu-Check Hector meter to test BG 4x/day    blood-glucose meter kit Dispense Accu-Chek Hector meter. Use as instructed    carvedilol (COREG) 25 MG tablet TAKE 2 TABLETS BY MOUTH TWICE A DAY    chlorthalidone (HYGROTEN) 25 MG Tab Take 1 tablet (25 mg total) by mouth once daily.    fluticasone-salmeterol 250-50 mcg/dose (ADVAIR) 250-50 mcg/dose diskus inhaler Inhale 1 puff into the lungs 2 (two) times daily. This is a change from one month    glimepiride (AMARYL) 2 MG tablet TAKE 1 TABLET BY MOUTH TWICE A DAY    insulin glargine (LANTUS SOLOSTAR) 100 unit/mL (3 mL) InPn pen INJECT 46 UNITS INTO THE SKIN EVERY EVENING.    lancets (ACCU-CHEK SOFTCLIX LANCETS) Misc Uses Accu-Chek Hector meter to test BG 4x/day    linagliptin (TRADJENTA) 5 mg Tab tablet Take 1 tablet (5 mg total) by mouth once daily.    metformin (GLUCOPHAGE) 500 MG tablet Take 2 tablets (1,000 mg total)  "by mouth 2 (two) times daily with meals. (Patient taking differently: Take 1,000 mg by mouth 2 (two) times daily with meals. ONE TAB WITH BREAKFAST; ONE TABLET WITH LUNCH; TWO TABLETS IN THE EVENING)    omega-3 fatty acids (FISH OIL) 500 mg Cap Take 1 capsule by mouth Twice daily.    pen needle, diabetic (BD INSULIN PEN NEEDLE UF MINI) 31 gauge x 3/16" Ndle USE WITH LANTUS AT BEDTIME    simvastatin (ZOCOR) 20 MG tablet Take 1 tablet (20 mg total) by mouth every evening.    valsartan (DIOVAN) 320 MG tablet Take 1 tablet (320 mg total) by mouth once daily.    albuterol (PROVENTIL) 2.5 mg /3 mL (0.083 %) nebulizer solution Take 3 mLs (2.5 mg total) by nebulization every 6 (six) hours as needed for Wheezing.    blood sugar diagnostic (ONETOUCH ULTRA TEST) Strp To use as directed with One Touch Ultra Meter and monitor blood sugars TID    hydrALAZINE (APRESOLINE) 100 MG tablet Take 1 tablet (100 mg total) by mouth 3 (three) times daily.    nitroGLYCERIN (NITROSTAT) 0.4 MG SL tablet Place 1 tablet under the tongue continuous prn.       No current facility-administered medications for this visit.      Objective:     Right Arm BP - Sittin/79 (17 1441)    /79  Pulse 78  Ht 5' 3" (1.6 m)  Wt 78.1 kg (172 lb 2.9 oz)  BMI 30.5 kg/m2    Physical Exam   Constitutional: She is oriented to person, place, and time. Vital signs are normal. She appears well-developed and well-nourished. She is active and cooperative. No distress.   HENT:   Head: Normocephalic and atraumatic.   Eyes: Conjunctivae and lids are normal. No scleral icterus.   Neck: Neck supple. JVD present. Normal carotid pulses and no hepatojugular reflux present. Carotid bruit is not present.   Cardiovascular: Normal rate, regular rhythm, S1 normal, S2 normal, normal heart sounds and intact distal pulses.  PMI is not displaced.  Exam reveals decreased pulses. Exam reveals no gallop and no friction rub.    No murmur heard.  Pulses:       " Carotid pulses are 2+ on the right side, and 2+ on the left side.       Radial pulses are 2+ on the right side, and 2+ on the left side.        Dorsalis pedis pulses are 1+ on the right side, and 1+ on the left side.        Posterior tibial pulses are 1+ on the right side, and 1+ on the left side.   Pulmonary/Chest: Effort normal. No respiratory distress. She has no decreased breath sounds. She has wheezes (Expiratory) in the right middle field, the right lower field, the left middle field and the left lower field. She has no rhonchi. She has no rales. She exhibits no tenderness.   Abdominal: Soft. Normal appearance and bowel sounds are normal. She exhibits distension (Central adipose present). She exhibits no fluid wave, no abdominal bruit, no ascites and no pulsatile midline mass. There is no hepatosplenomegaly. There is no tenderness.   Musculoskeletal: She exhibits edema (Bilateral trace lower extremity non-pitting edema R>L).   Neurological: She is alert and oriented to person, place, and time. She has normal strength. Gait normal.   Skin: Skin is warm, dry and intact. No rash noted. She is not diaphoretic. Nails show no clubbing.   Psychiatric: She has a normal mood and affect. Her speech is normal and behavior is normal. Judgment and thought content normal. Cognition and memory are normal.   Nursing note and vitals reviewed.      Chemistry        Component Value Date/Time     02/03/2017 0722    K 4.5 02/03/2017 0722     02/03/2017 0722    CO2 25 02/03/2017 0722    BUN 25 (H) 02/03/2017 0722    CREATININE 1.2 02/03/2017 0722     (H) 02/03/2017 0722        Component Value Date/Time    CALCIUM 9.7 02/03/2017 0722    ALKPHOS 55 02/03/2017 0722    AST 19 02/03/2017 0722    ALT 25 02/03/2017 0722    BILITOT 0.3 02/03/2017 0722          Recent Labs  Lab 11/07/16  1533  01/19/17  0735 02/03/17  0722   WHITE BLOOD CELL COUNT 7.72  < >  --  9.74   HEMOGLOBIN 12.0  < >  --  11.9 L   HEMATOCRIT 37.3  <  >  --  36.7 L   MCV 92  < >  --  93   PLATELETS 213  < >  --  225    H  --   --   --    TSH  --   --  0.739  --    CHOLESTEROL  --   --   --  119 L   HDL  --   --   --  43   LDL CHOLESTEROL  --   --   --  51.0 L   TRIGLYCERIDES  --   --   --  125   HDL/CHOLESTEROL RATIO  --   --   --  36.1   < > = values in this interval not displayed.           Test(s) Reviewed  I have reviewed the following in detail:  [x] Stress test   [] Angiography   [x] Echocardiogram   [x] Labs   [x] Other:  Progress notes; imaging       Assessment:     1. Nonischemic cardiomyopathy  Stable   2. Chronic systolic heart failure Stable Trace edema   3. KHOA (obstructive sleep apnea)  Sub-optimally controlled   4. Biventricular ICD (implantable cardioverter-defibrillator) in place  Followed by EP clinic   5. Coronary artery disease involving native coronary artery of native heart without angina pectoris  Stable. Asymptomatic   6. Pure hypercholesterolemia  stable   7. Essential hypertension  Sub-optimally controlled   8. Type 2 diabetes mellitus with microalbuminuria, with long-term current use of insulin Poorly controlled. Last A1C 8.1   9. Asthma, chronic, severe persistent, uncomplicated Sub-optimally controlled Sub-optimally controlled   10. Adrenal cortical nodule: repeat CT 6/2016  Adrenal cortical nodules seen on CT 3/2016. No repeat imaging found      Plan:     Nonischemic cardiomyopathy  Comments:  No change    Chronic systolic heart failure  Comments:  Trace edema. No changes    KHOA (obstructive sleep apnea)  Comments:  Follow up with Dr. Velazquez in sleep lab for potential changes in settings giving daytime sleepiness and napping.    Biventricular ICD (implantable cardioverter-defibrillator) in place  Comments:  Follow up with Dr. Mckeon next week    Coronary artery disease involving native coronary artery of native heart without angina pectoris  Comments:  Asymptomatic. Continue current therapy    Pure  hypercholesterolemia  Comments:  Continue current therapy    Essential hypertension  Comments:  BP goal <130/80. Not at goal. Increased Hydralazine to 100mg TID. Instructed to take BP daily and bring log to appointment in 3 months.   Orders:  -     hydrALAZINE (APRESOLINE) 100 MG tablet; Take 1 tablet (100 mg total) by mouth 3 (three) times daily.  Dispense: 90 tablet; Refill: 11    Type 2 diabetes mellitus with microalbuminuria, with long-term current use of insulin  Comments:  Last A1C 8.1. A1C goal <7. Discussed weight loss, low salt heart healthy diet, and walking 5 days/week for 30 minutes/day. Instructed to follow up with Endocrin    Asthma, chronic, severe persistent, uncomplicated  Comments:  Expiratory wheezing on exam. Using Rescue inhaler at least daily. Not taking allergy medications but reports allergic rhinitis. Instructed to follow up with PCP    Adrenal cortical nodule: repeat CT 6/2016  Comments:  Adrenal cortical nodules seen on CT 3/2016. No repeat imaging found. Instructed to follow up with Endocrinology and PCP for management.     Return in about 3 months (around 6/9/2017).  Consider having tighter control of diabetes with a goal A1C<7.  Blood pressure goal <130/80. Adjust CPAP to prevent KHOA from contributing negatively to her HTN.

## 2017-03-09 NOTE — MR AVS SNAPSHOT
[facility] - Cardiology  [address]  [facility] [address]  Phone: [phone]                  Myesha Quinones   3/9/2017 3:00 PM   Office Visit    Description:  Female : 1940   Provider:  Jose Luis Fernando MD   Department:  [facility]           Reason for Visit     Cardiomyopathy           Diagnoses this Visit        Comments    KHOA (obstructive sleep apnea)    -  Primary     Asthma, chronic, severe persistent, uncomplicated         Nonischemic cardiomyopathy         Chronic systolic heart failure         Coronary artery disease involving native coronary artery of native heart without angina pectoris         Essential hypertension                To Do List           Future Appointments        Provider Department Dept Phone    3/15/2017 2:20 PM Jan Mckeon MD [facility] - Arrhythmia [phone]    3/23/2017 9:00 AM [facility] [id number] DX [facility] [phone]    3/23/2017 10:30 AM LAB, HEMONC CANCER BLDG [facility] [phone]    3/23/2017 11:30 AM MD [facility]lyss[facility] Sanchez - Hematology Oncology [phone]    2017 1:40 PM PACEMAKER, ICD [facility] - Arrhythmia [phone]      Goals (5 Years of Data)     None      Follow-Up and Disposition     Return in about 3 months (around 2017).       These Medications        Disp Refills Start End    hydrALAZINE (APRESOLINE) 100 MG tablet 90 tablet 11 3/9/2017 3/9/2018    Take 1 tablet (100 mg total) by mouth 3 (three) times daily. - Oral    Pharmacy: [facility]/pharmacy #[id number] - [place] - [address] AT [facility] Ph #: [phone]         [facility]     [facility] Care [facility] -  Assistance  Registered nurses in the [facility] provide clinical advisement, health education, appointment booking, and other advisory services.  Call for this free service at [phone].             Medications           Message regarding Medications      Verify the changes and/or additions to your medication regime listed below are the same as discussed with your clinician today.  If any of these changes or additions are incorrect, please notify your healthcare provider.        START taking these NEW medications        Refills    hydrALAZINE (APRESOLINE) 100 MG tablet 11    Sig: Take 1 tablet (100 mg total) by mouth 3 (three) times daily.    Class: Normal    Route: Oral           Verify that the below list of medications is an accurate representation of the medications you are currently taking.  If none reported, the list may be blank. If incorrect, please contact your healthcare provider. Carry this list with you in case of emergency.           Current Medications     aspirin (ENTERIC COATED ASPIRIN) 81 MG EC tablet Take 1 tablet by mouth.    azelastine (ASTELIN) 137 mcg (0.1 %) nasal spray 1 spray (137 mcg total) by Nasal route 2 (two) times daily. For severe allergy    blood sugar diagnostic (ACCU-CHEK HECTOR) Strp Uses Accu-Check Hector meter to test BG 4x/day    blood-glucose meter kit Dispense Accu-Chek Hector meter. Use as instructed    carvedilol (COREG) 25 MG tablet TAKE 2 TABLETS BY MOUTH TWICE A DAY    chlorthalidone (HYGROTEN) 25 MG Tab Take 1 tablet (25 mg total) by mouth once daily.    fluticasone-salmeterol 250-50 mcg/dose (ADVAIR) 250-50 mcg/dose diskus inhaler Inhale 1 puff into the lungs 2 (two) times daily. This is a change from one month    glimepiride (AMARYL) 2 MG tablet TAKE 1 TABLET BY MOUTH TWICE A DAY    insulin glargine (LANTUS SOLOSTAR) 100 unit/mL (3 mL) InPn pen INJECT 46 UNITS INTO THE SKIN EVERY EVENING.    lancets (ACCU-CHEK SOFTCLIX LANCETS) Misc Uses Accu-Chek Hector meter to test BG 4x/day    linagliptin (TRADJENTA) 5 mg Tab tablet Take 1 tablet (5 mg total) by mouth once daily.    metformin (GLUCOPHAGE) 500 MG tablet Take 2 tablets (1,000 mg total) by mouth 2 (two) times daily with meals.    omega-3 fatty acids (FISH OIL) 500 mg Cap  "Take 1 capsule by mouth Twice daily.    pen needle, diabetic (BD INSULIN PEN NEEDLE UF MINI) 31 gauge x 3/16" Ndle USE WITH LANTUS AT BEDTIME    simvastatin (ZOCOR) 20 MG tablet Take 1 tablet (20 mg total) by mouth every evening.    valsartan (DIOVAN) 320 MG tablet Take 1 tablet (320 mg total) by mouth once daily.    albuterol (PROVENTIL) 2.5 mg /3 mL (0.083 %) nebulizer solution Take 3 mLs (2.5 mg total) by nebulization every 6 (six) hours as needed for Wheezing.    blood sugar diagnostic (ONETOUCH ULTRA TEST) Strp To use as directed with One Touch Ultra Meter and monitor blood sugars TID    hydrALAZINE (APRESOLINE) 100 MG tablet Take 1 tablet (100 mg total) by mouth 3 (three) times daily.    nitroGLYCERIN (NITROSTAT) 0.4 MG SL tablet Place 1 tablet under the tongue continuous prn.             Clinical Reference Information           Your Vitals Were     BP Pulse Height Weight BMI    130/79 78 5' 3" (1.6 m) 78.1 kg (172 lb 2.9 oz) 30.5 kg/m2      Blood Pressure          Most Recent Value    Right Arm BP - Sitting  130/79    Reason for not completing BP on both arms  mastectomy    BP  130/79      Allergies as of 3/9/2017     Iodine And Iodide Containing Products      Immunizations Administered on Date of Encounter - 3/9/2017     None      [facility] Sign-Up     Activating your [facility] account is as easy as 1-2-3!     1) Visit [email or web address], select Sign Up Now, enter this activation code and your date of birth, then select Next.  [email or web address]  Expires: 3/12/2017  9:33 AM      2) Create a username and password to use when you visit [facility] in the future and select a security question in case you lose your password and select Next.    3) Enter your e-mail address and click Sign Up!    Additional Information  If you have questions, please e-mail [email or web address] or call [phone] to talk to our [facility] staff. Remember, [facility] is NOT to be used for urgent needs. For medical emergencies, dial 911.    "      Instructions    1. Increase Hydralazine (Aprezoline) to 100mg three times a day.  2. Monitor blood pressure once a day and bring log to follow up appointment with Dr. Fernando in 3 months.  3.  Begin walking 30 minutes a day for 5 days a week and follow a low salt heart healthy diet such as the Mediterranean diet.   4. Follow up with Endocrinology and Dr. Gaston regarding the kidney nodules seen in 3/2016 on CT scan.   5. Consider having Dr. Velazquez adjust your CPAP to decrease your daytime fatigue.   6.  Please follow up with Dr. Gaston regarding allergy medication to get your asthma into better control.        Language Assistance Services     ATTENTION: Language assistance services are available, free of charge. Please call [phone].      ATENCIÓN: Si lizala raji, tiene a frye disposición servicios gratuitos de asistencia lingüística. Llame al [phone].     CHÚ Ý: N?u b?n nói Ti?ng Vi?t, có các d?ch v? h? tr? ngôn ng? mi?n phí dành cho b?n. G?i s? [phone].         [facility] complies with applicable Federal civil rights laws and does not discriminate on the basis of race, color, national origin, age, disability, or sex.

## 2017-03-09 NOTE — PATIENT INSTRUCTIONS
1. Increase Hydralazine (Aprezoline) to 100mg three times a day.  2. Monitor blood pressure once a day and bring log to follow up appointment with Dr. Fernando in 3 months.  3.  Begin walking 30 minutes a day for 5 days a week and follow a low salt heart healthy diet such as the Mediterranean diet.   4. Follow up with Endocrinology and Dr. Gaston regarding the kidney nodules seen in 3/2016 on CT scan.   5. Consider having Dr. Velazquez adjust your CPAP to decrease your daytime fatigue.   6.  Please follow up with Dr. Gaston regarding allergy medication to get your asthma into better control.

## 2017-03-11 DIAGNOSIS — I42.8 NICM (NONISCHEMIC CARDIOMYOPATHY): Primary | ICD-10-CM

## 2017-03-22 RX ORDER — LINAGLIPTIN 5 MG/1
TABLET, FILM COATED ORAL
Qty: 90 TABLET | Refills: 1 | Status: CANCELLED | OUTPATIENT
Start: 2017-03-22

## 2017-03-23 ENCOUNTER — HOSPITAL ENCOUNTER (OUTPATIENT)
Dept: RADIOLOGY | Facility: HOSPITAL | Age: 77
Discharge: HOME | End: 2017-03-23
Attending: INTERNAL MEDICINE
Payer: MEDICARE

## 2017-03-23 ENCOUNTER — OFFICE VISIT (OUTPATIENT)
Dept: HEMATOLOGY/ONCOLOGY | Facility: CLINIC | Age: 77
End: 2017-03-23
Payer: MEDICARE

## 2017-03-23 VITALS
SYSTOLIC BLOOD PRESSURE: 176 MMHG | OXYGEN SATURATION: 95 % | WEIGHT: 165.13 LBS | DIASTOLIC BLOOD PRESSURE: 77 MMHG | TEMPERATURE: 99 F | BODY MASS INDEX: 29.25 KG/M2 | HEART RATE: 87 BPM | RESPIRATION RATE: 20 BRPM

## 2017-03-23 DIAGNOSIS — Z85.3 HISTORY OF BREAST CANCER: Primary | ICD-10-CM

## 2017-03-23 DIAGNOSIS — Z85.3 HISTORY OF BREAST CANCER: ICD-10-CM

## 2017-03-23 PROCEDURE — 1160F RVW MEDS BY RX/DR IN RCRD: CPT | Mod: S$GLB,,, | Performed by: INTERNAL MEDICINE

## 2017-03-23 PROCEDURE — 1125F AMNT PAIN NOTED PAIN PRSNT: CPT | Mod: S$GLB,,, | Performed by: INTERNAL MEDICINE

## 2017-03-23 PROCEDURE — 77065 DX MAMMO INCL CAD UNI: CPT | Mod: 26,LT,, | Performed by: RADIOLOGY

## 2017-03-23 PROCEDURE — 1157F ADVNC CARE PLAN IN RCRD: CPT | Mod: S$GLB,,, | Performed by: INTERNAL MEDICINE

## 2017-03-23 PROCEDURE — 3077F SYST BP >= 140 MM HG: CPT | Mod: S$GLB,,, | Performed by: INTERNAL MEDICINE

## 2017-03-23 PROCEDURE — 3078F DIAST BP <80 MM HG: CPT | Mod: S$GLB,,, | Performed by: INTERNAL MEDICINE

## 2017-03-23 PROCEDURE — 1159F MED LIST DOCD IN RCRD: CPT | Mod: S$GLB,,, | Performed by: INTERNAL MEDICINE

## 2017-03-23 PROCEDURE — 99214 OFFICE O/P EST MOD 30 MIN: CPT | Mod: S$GLB,,, | Performed by: INTERNAL MEDICINE

## 2017-03-23 PROCEDURE — 99999 PR PBB SHADOW E&M-EST. PATIENT-LVL III: CPT | Mod: PBBFAC,,, | Performed by: INTERNAL MEDICINE

## 2017-03-23 PROCEDURE — 77061 BREAST TOMOSYNTHESIS UNI: CPT | Mod: 26,LT,, | Performed by: RADIOLOGY

## 2017-03-24 ENCOUNTER — TELEPHONE (OUTPATIENT)
Dept: ELECTROPHYSIOLOGY | Facility: CLINIC | Age: 77
End: 2017-03-24

## 2017-03-24 NOTE — TELEPHONE ENCOUNTER
Attempted to return pts call to assist in rescheduling pts appts and have them all on the same day. Left message on pts vm to call back and i will be happy to assist her with this

## 2017-03-28 NOTE — TELEPHONE ENCOUNTER
----- Message from Kaylyn Wright sent at 3/28/2017  9:10 AM CDT -----  Contact: pt  [phone]  [place]   -  Pt calling to get an 90 day refill on medication linagliptin (TRADJENTA   , pt is completely out  -   [facility]    Thanks,

## 2017-03-29 ENCOUNTER — TELEPHONE (OUTPATIENT)
Dept: SLEEP MEDICINE | Facility: CLINIC | Age: 77
End: 2017-03-29

## 2017-03-29 DIAGNOSIS — G47.33 OSA (OBSTRUCTIVE SLEEP APNEA): Primary | ICD-10-CM

## 2017-03-29 NOTE — TELEPHONE ENCOUNTER
----- Message from Varsha Moses sent at 3/29/2017  2:35 PM CDT -----  _  1st Request  _  2nd Request  _  3rd Request        Who: patient    Why:pt is calling to get a new RX for sleep supplies sent to [facility] because [facility]'t take [employer] anymore  Fax [phone]          What Number to Call Back:[phone]  When to Expect a call back: (Before the end of the day)   -- if the call is after 12:00, the call back will be tomorrow.

## 2017-04-12 DIAGNOSIS — E11.9 DIABETES MELLITUS WITHOUT COMPLICATION: ICD-10-CM

## 2017-05-10 ENCOUNTER — CLINICAL SUPPORT (OUTPATIENT)
Dept: ELECTROPHYSIOLOGY | Facility: CLINIC | Age: 77
End: 2017-05-10
Payer: MEDICARE

## 2017-05-10 DIAGNOSIS — I50.22 CHRONIC SYSTOLIC HEART FAILURE: ICD-10-CM

## 2017-05-10 DIAGNOSIS — I44.7 LBBB (LEFT BUNDLE BRANCH BLOCK): ICD-10-CM

## 2017-05-10 DIAGNOSIS — Z95.810 AUTOMATIC IMPLANTABLE CARDIAC DEFIBRILLATOR IN SITU: ICD-10-CM

## 2017-05-10 DIAGNOSIS — I42.8 NONISCHEMIC CARDIOMYOPATHY: ICD-10-CM

## 2017-05-10 PROCEDURE — 93284 PRGRMG EVAL IMPLANTABLE DFB: CPT | Mod: S$GLB,,, | Performed by: INTERNAL MEDICINE

## 2017-05-11 ENCOUNTER — LAB VISIT (OUTPATIENT)
Dept: LAB | Facility: HOSPITAL | Age: 77
End: 2017-05-11
Attending: INTERNAL MEDICINE
Payer: MEDICARE

## 2017-05-11 DIAGNOSIS — I10 ESSENTIAL HYPERTENSION: ICD-10-CM

## 2017-05-11 DIAGNOSIS — E11.42 DIABETIC POLYNEUROPATHY ASSOCIATED WITH TYPE 2 DIABETES MELLITUS: ICD-10-CM

## 2017-05-11 DIAGNOSIS — E11.29 TYPE 2 DIABETES MELLITUS WITH MICROALBUMINURIA, WITH LONG-TERM CURRENT USE OF INSULIN: ICD-10-CM

## 2017-05-11 DIAGNOSIS — Z79.4 TYPE 2 DIABETES MELLITUS WITH MICROALBUMINURIA, WITH LONG-TERM CURRENT USE OF INSULIN: ICD-10-CM

## 2017-05-11 DIAGNOSIS — R80.9 TYPE 2 DIABETES MELLITUS WITH MICROALBUMINURIA, WITH LONG-TERM CURRENT USE OF INSULIN: ICD-10-CM

## 2017-05-11 LAB
ALBUMIN SERPL BCP-MCNC: 3.5 G/DL
ALP SERPL-CCNC: 51 U/L
ALT SERPL W/O P-5'-P-CCNC: 23 U/L
ANION GAP SERPL CALC-SCNC: 12 MMOL/L
ANION GAP SERPL CALC-SCNC: 12 MMOL/L
AST SERPL-CCNC: 19 U/L
BASOPHILS # BLD AUTO: 0.04 K/UL
BASOPHILS NFR BLD: 0.4 %
BILIRUB SERPL-MCNC: 0.3 MG/DL
BUN SERPL-MCNC: 30 MG/DL
BUN SERPL-MCNC: 30 MG/DL
CALCIUM SERPL-MCNC: 9.6 MG/DL
CALCIUM SERPL-MCNC: 9.6 MG/DL
CHLORIDE SERPL-SCNC: 105 MMOL/L
CHLORIDE SERPL-SCNC: 105 MMOL/L
CHOLEST/HDLC SERPL: 2.4 {RATIO}
CO2 SERPL-SCNC: 23 MMOL/L
CO2 SERPL-SCNC: 23 MMOL/L
CREAT SERPL-MCNC: 1.2 MG/DL
CREAT SERPL-MCNC: 1.2 MG/DL
DIFFERENTIAL METHOD: ABNORMAL
EOSINOPHIL # BLD AUTO: 0.4 K/UL
EOSINOPHIL NFR BLD: 4.2 %
ERYTHROCYTE [DISTWIDTH] IN BLOOD BY AUTOMATED COUNT: 13.1 %
EST. GFR  (AFRICAN AMERICAN): 50 ML/MIN/1.73 M^2
EST. GFR  (AFRICAN AMERICAN): 50 ML/MIN/1.73 M^2
EST. GFR  (NON AFRICAN AMERICAN): 43.4 ML/MIN/1.73 M^2
EST. GFR  (NON AFRICAN AMERICAN): 43.4 ML/MIN/1.73 M^2
ESTIMATED AVG GLUCOSE: 169 MG/DL
ESTIMATED AVG GLUCOSE: 169 MG/DL
GLUCOSE SERPL-MCNC: 89 MG/DL
GLUCOSE SERPL-MCNC: 89 MG/DL
HBA1C MFR BLD HPLC: 7.5 %
HBA1C MFR BLD HPLC: 7.5 %
HCT VFR BLD AUTO: 34.4 %
HDL/CHOLESTEROL RATIO: 41.2 %
HDLC SERPL-MCNC: 102 MG/DL
HDLC SERPL-MCNC: 42 MG/DL
HGB BLD-MCNC: 11.2 G/DL
LDLC SERPL CALC-MCNC: 39.8 MG/DL
LYMPHOCYTES # BLD AUTO: 1.8 K/UL
LYMPHOCYTES NFR BLD: 18.9 %
MCH RBC QN AUTO: 29.3 PG
MCHC RBC AUTO-ENTMCNC: 32.6 %
MCV RBC AUTO: 90 FL
MONOCYTES # BLD AUTO: 0.9 K/UL
MONOCYTES NFR BLD: 9.7 %
NEUTROPHILS # BLD AUTO: 6.2 K/UL
NEUTROPHILS NFR BLD: 65.9 %
NONHDLC SERPL-MCNC: 60 MG/DL
PLATELET # BLD AUTO: 233 K/UL
PMV BLD AUTO: 11.9 FL
POTASSIUM SERPL-SCNC: 4.2 MMOL/L
POTASSIUM SERPL-SCNC: 4.2 MMOL/L
PROT SERPL-MCNC: 6.8 G/DL
RBC # BLD AUTO: 3.82 M/UL
SODIUM SERPL-SCNC: 140 MMOL/L
SODIUM SERPL-SCNC: 140 MMOL/L
TRIGL SERPL-MCNC: 101 MG/DL
WBC # BLD AUTO: 9.36 K/UL

## 2017-05-11 PROCEDURE — 36415 COLL VENOUS BLD VENIPUNCTURE: CPT | Mod: PO

## 2017-05-11 PROCEDURE — 83036 HEMOGLOBIN GLYCOSYLATED A1C: CPT

## 2017-05-11 PROCEDURE — 85025 COMPLETE CBC W/AUTO DIFF WBC: CPT

## 2017-05-11 PROCEDURE — 80053 COMPREHEN METABOLIC PANEL: CPT

## 2017-05-11 PROCEDURE — 80061 LIPID PANEL: CPT

## 2017-05-16 ENCOUNTER — OFFICE VISIT (OUTPATIENT)
Dept: INTERNAL MEDICINE | Facility: CLINIC | Age: 77
End: 2017-05-16
Payer: MEDICARE

## 2017-05-16 VITALS
OXYGEN SATURATION: 97 % | DIASTOLIC BLOOD PRESSURE: 68 MMHG | HEIGHT: 63 IN | WEIGHT: 162.94 LBS | HEART RATE: 82 BPM | SYSTOLIC BLOOD PRESSURE: 138 MMHG | BODY MASS INDEX: 28.87 KG/M2

## 2017-05-16 DIAGNOSIS — E78.00 PURE HYPERCHOLESTEROLEMIA: ICD-10-CM

## 2017-05-16 DIAGNOSIS — R80.9 TYPE 2 DIABETES MELLITUS WITH MICROALBUMINURIA, WITH LONG-TERM CURRENT USE OF INSULIN: Primary | ICD-10-CM

## 2017-05-16 DIAGNOSIS — E11.42 DIABETIC POLYNEUROPATHY ASSOCIATED WITH TYPE 2 DIABETES MELLITUS: ICD-10-CM

## 2017-05-16 DIAGNOSIS — Z79.4 TYPE 2 DIABETES MELLITUS WITH MICROALBUMINURIA, WITH LONG-TERM CURRENT USE OF INSULIN: Primary | ICD-10-CM

## 2017-05-16 DIAGNOSIS — E53.8 B12 NUTRITIONAL DEFICIENCY: ICD-10-CM

## 2017-05-16 DIAGNOSIS — E04.1 THYROID NODULE: ICD-10-CM

## 2017-05-16 DIAGNOSIS — D50.9 IRON DEFICIENCY ANEMIA, UNSPECIFIED IRON DEFICIENCY ANEMIA TYPE: ICD-10-CM

## 2017-05-16 DIAGNOSIS — I10 ESSENTIAL HYPERTENSION: ICD-10-CM

## 2017-05-16 DIAGNOSIS — N18.30 CHRONIC KIDNEY DISEASE, STAGE 3, MOD DECREASED GFR: ICD-10-CM

## 2017-05-16 DIAGNOSIS — E11.29 TYPE 2 DIABETES MELLITUS WITH MICROALBUMINURIA, WITH LONG-TERM CURRENT USE OF INSULIN: Primary | ICD-10-CM

## 2017-05-16 PROCEDURE — 3078F DIAST BP <80 MM HG: CPT | Mod: S$GLB,,, | Performed by: INTERNAL MEDICINE

## 2017-05-16 PROCEDURE — 1160F RVW MEDS BY RX/DR IN RCRD: CPT | Mod: S$GLB,,, | Performed by: INTERNAL MEDICINE

## 2017-05-16 PROCEDURE — 99215 OFFICE O/P EST HI 40 MIN: CPT | Mod: S$GLB,,, | Performed by: INTERNAL MEDICINE

## 2017-05-16 PROCEDURE — 1126F AMNT PAIN NOTED NONE PRSNT: CPT | Mod: S$GLB,,, | Performed by: INTERNAL MEDICINE

## 2017-05-16 PROCEDURE — 99999 PR PBB SHADOW E&M-EST. PATIENT-LVL V: CPT | Mod: PBBFAC,,, | Performed by: INTERNAL MEDICINE

## 2017-05-16 PROCEDURE — 99499 UNLISTED E&M SERVICE: CPT | Mod: S$GLB,,, | Performed by: INTERNAL MEDICINE

## 2017-05-16 PROCEDURE — 1159F MED LIST DOCD IN RCRD: CPT | Mod: S$GLB,,, | Performed by: INTERNAL MEDICINE

## 2017-05-16 PROCEDURE — 3075F SYST BP GE 130 - 139MM HG: CPT | Mod: S$GLB,,, | Performed by: INTERNAL MEDICINE

## 2017-05-16 NOTE — MR AVS SNAPSHOT
[facility] Medicine  [address]  [facility] [address]  Phone: [phone]  Fax: [phone]                  Myesha Quinones   2017 7:30 AM   Office Visit    Description:  Female : 1940   Provider:  Emelina Gaston MD   Department:  [facility] - Internal Medicine           Reason for Visit     Follow-up           Diagnoses this Visit        Comments    Type 2 diabetes mellitus with microalbuminuria, with long-term current use of insulin    -  Primary     Diabetic polyneuropathy associated with type 2 diabetes mellitus         Essential hypertension         Chronic kidney disease, stage 3, mod decreased GFR         Pure hypercholesterolemia         Iron deficiency anemia, unspecified iron deficiency anemia type         Thyroid nodule         B12 nutritional deficiency                To Do List           Future Appointments        Provider Department Dept Phone    2017 9:30 AM Beny Kemp, APRN,ANP-C [facility] Endocrinology [phone]    2017 11:00 AM HRA, NOM 4 [facility] [phone]    2017 1:00 PM EKG, APPT [facility] [phone]    2017 1:30 PM Luisana Spain, SRINIP [facility] Arrhythmia [phone]    2017 9:30 AM Jose Luis Fernando MD [facility] Cardiology [phone]      Goals (5 Years of Data)     None      Follow-Up and Disposition     Return in about 3 months (around 2017) for FU with lab: cbc, cmp, lipid, tsh A1C B12 iron tibc.    Follow-up and Disposition History      [facility]     [facility] Care [facility] -  Assistance  Unless otherwise directed by your provider, please contact [facility] On-Call, our nurse care line that is available for  assistance.     Registered nurses in the [facility] provide: appointment scheduling, clinical advisement, health education, and other advisory services.  Call: [phone] (toll free)               Medications           Message  regarding Medications     Verify the changes and/or additions to your medication regime listed below are the same as discussed with your clinician today.  If any of these changes or additions are incorrect, please notify your healthcare provider.             Verify that the below list of medications is an accurate representation of the medications you are currently taking.  If none reported, the list may be blank. If incorrect, please contact your healthcare provider. Carry this list with you in case of emergency.           Current Medications     albuterol (PROVENTIL) 2.5 mg /3 mL (0.083 %) nebulizer solution Take 3 mLs (2.5 mg total) by nebulization every 6 (six) hours as needed for Wheezing.    aspirin (ENTERIC COATED ASPIRIN) 81 MG EC tablet Take 1 tablet by mouth.    azelastine (ASTELIN) 137 mcg (0.1 %) nasal spray 1 spray (137 mcg total) by Nasal route 2 (two) times daily. For severe allergy    blood sugar diagnostic (ACCU-CHEK HECTOR) Strp Uses Accu-Check Hector meter to test BG 4x/day    blood sugar diagnostic (ONETOUCH ULTRA TEST) Strp To use as directed with One Touch Ultra Meter and monitor blood sugars TID    blood-glucose meter kit Dispense Accu-Chek Hector meter. Use as instructed    carvedilol (COREG) 25 MG tablet TAKE 2 TABLETS BY MOUTH TWICE A DAY    chlorthalidone (HYGROTEN) 25 MG Tab Take 1 tablet (25 mg total) by mouth once daily.    fluticasone-salmeterol 250-50 mcg/dose (ADVAIR) 250-50 mcg/dose diskus inhaler Inhale 1 puff into the lungs 2 (two) times daily. This is a change from one month    glimepiride (AMARYL) 2 MG tablet TAKE 1 TABLET BY MOUTH TWICE A DAY    hydrALAZINE (APRESOLINE) 100 MG tablet Take 1 tablet (100 mg total) by mouth 3 (three) times daily.    insulin glargine (LANTUS SOLOSTAR) 100 unit/mL (3 mL) InPn pen INJECT 46 UNITS INTO THE SKIN EVERY EVENING.    lancets (ACCU-CHEK SOFTCLIX LANCETS) Misc Uses Accu-Chek Hector meter to test BG 4x/day    linagliptin (TRADJENTA) 5 mg Tab  "tablet Take 1 tablet (5 mg total) by mouth once daily.    metformin (GLUCOPHAGE) 500 MG tablet Take 2 tablets (1,000 mg total) by mouth 2 (two) times daily with meals.    nitroGLYCERIN (NITROSTAT) 0.4 MG SL tablet Place 1 tablet under the tongue continuous prn.      omega-3 fatty acids (FISH OIL) 500 mg Cap Take 1 capsule by mouth Twice daily.    pen needle, diabetic (BD INSULIN PEN NEEDLE UF MINI) 31 gauge x 3/16" Ndle USE WITH LANTUS AT BEDTIME    simvastatin (ZOCOR) 20 MG tablet Take 1 tablet (20 mg total) by mouth every evening.    valsartan (DIOVAN) 320 MG tablet Take 1 tablet (320 mg total) by mouth once daily.           Clinical Reference Information           Your Vitals Were     BP Pulse Height Weight SpO2 BMI    138/68 82 5' 3" (1.6 m) 73.9 kg (162 lb 14.7 oz) 97% 28.86 kg/m2      Blood Pressure          Most Recent Value    BP  138/68      Allergies as of 5/16/2017     Iodine And Iodide Containing Products      Immunizations Administered on Date of Encounter - 5/16/2017     None      Orders Placed During Today's Visit      Normal Orders This Visit    Ambulatory consult to Podiatry     Future Labs/Procedures Expected by Expires    Microalbumin/creatinine urine ratio  5/16/2017 (Approximate) 7/15/2017    US Soft Tissue Head Neck Thyroid  5/16/2017 5/16/2018    CBC auto differential  8/1/2017 (Approximate) 10/16/2017    Comprehensive metabolic panel  8/1/2017 (Approximate) 10/16/2017    Ferritin  8/1/2017 10/16/2017    Hemoglobin A1c  8/1/2017 (Approximate) 10/6/2017    Iron and TIBC  8/1/2017 10/16/2017    TSH  8/1/2017 (Approximate) 10/16/2017    Vitamin B12  8/1/2017 10/16/2017      [facility] Sign-Up     Activating your [facility] account is as easy as 1-2-3!     1) Visit [email or web address], select Sign Up Now, enter this activation code and your date of birth, then select Next.  [email or web address]  Expires: 6/30/2017  8:05 AM      2) Create a username and password to use when you visit [facility] in the future " and select a security question in case you lose your password and select Next.    3) Enter your e-mail address and click Sign Up!    Additional Information  If you have questions, please e-mail [email or web address] or call [phone] to talk to our [facility] staff. Remember, [facility] is NOT to be used for urgent needs. For medical emergencies, dial 911.         Language Assistance Services     ATTENTION: Language assistance services are available, free of charge. Please call [phone].      ATENCIÓN: Si habla español, tiene a frye disposición servicios gratuitos de asistencia lingüística. Llame al [phone].     CHÚ Ý: N?u b?n nói Ti?ng Vi?t, có các d?ch v? h? tr? ngôn ng? mi?n phí dành cho b?n. G?i s? [phone].         [facility] complies with applicable Federal civil rights laws and does not discriminate on the basis of race, color, national origin, age, disability, or sex.

## 2017-05-16 NOTE — PROGRESS NOTES
"Subjective:       Patient ID: Myesha Quinones is a 77 y.o. female.    Chief Complaint: Follow-up (Diabetes)    HPI   Patient was seen 3 months ago:    Diabetes type 2  medication compliance: compliant all of the time, diabetic diet compliance: compliant all of the time, home glucose monitoring: is performed regularly  /68  Pulse 82  Ht 5' 3" (1.6 m)  Wt 73.9 kg (162 lb 14.7 oz)  SpO2 97%  BMI 28.86 kg/m2,   Hemoglobin A1C   Date Value Ref Range Status   05/11/2017 7.5 (H) 4.5 - 6.2 % Final     Comment:     According to ADA guidelines, hemoglobin A1C <7.0% represents  optimal control in non-pregnant diabetic patients.  Different  metrics may apply to specific populations.   Standards of Medical Care in Diabetes - 2016.  For the purpose of screening for the presence of diabetes:  <5.7%     Consistent with the absence of diabetes  5.7-6.4%  Consistent with increasing risk for diabetes   (prediabetes)  >or=6.5%  Consistent with diabetes  Currently no consensus exists for use of hemoglobin A1C  for diagnosis of diabetes for children.     05/11/2017 7.5 (H) 4.5 - 6.2 % Final     Comment:     According to ADA guidelines, hemoglobin A1C <7.0% represents  optimal control in non-pregnant diabetic patients.  Different  metrics may apply to specific populations.   Standards of Medical Care in Diabetes - 2016.  For the purpose of screening for the presence of diabetes:  <5.7%     Consistent with the absence of diabetes  5.7-6.4%  Consistent with increasing risk for diabetes   (prediabetes)  >or=6.5%  Consistent with diabetes  Currently no consensus exists for use of hemoglobin A1C  for diagnosis of diabetes for children.     02/03/2017 8.1 (H) 4.5 - 6.2 % Final     Comment:     According to ADA guidelines, hemoglobin A1C <7.0% represents  optimal control in non-pregnant diabetic patients.  Different  metrics may apply to specific populations.   Standards of Medical Care in Diabetes - 2016.  For the purpose of " screening for the presence of diabetes:  <5.7%     Consistent with the absence of diabetes  5.7-6.4%  Consistent with increasing risk for diabetes   (prediabetes)  >or=6.5%  Consistent with diabetes  Currently no consensus exists for use of hemoglobin A1C  for diagnosis of diabetes for children.     ,   Creatinine   Date Value Ref Range Status   05/11/2017 1.2 0.5 - 1.4 mg/dL Final   05/11/2017 1.2 0.5 - 1.4 mg/dL Final   03/23/2017 1.4 0.5 - 1.4 mg/dL Final       Lab Results   Component Value Date    LDLCALC 39.8 (L) 05/11/2017    LDLCALC 51.0 (L) 02/03/2017    LDLCALC 58.6 (L) 07/14/2016         Review of Systems   Constitutional: Negative for chills, fever and unexpected weight change.   HENT: Negative for ear pain, nosebleeds, sore throat, trouble swallowing and voice change.    Eyes: Negative for photophobia and visual disturbance.   Respiratory: Negative for cough, shortness of breath and wheezing.         Some exertional shortness of breath   Cardiovascular: Negative for chest pain, palpitations and leg swelling.   Gastrointestinal: Negative for abdominal distention, abdominal pain and blood in stool.   Genitourinary: Negative for difficulty urinating, dysuria, flank pain and hematuria.   Musculoskeletal: Negative for back pain, gait problem and joint swelling.   Skin: Negative for color change and rash.   Neurological: Negative for seizures and headaches.   Hematological: Negative for adenopathy. Does not bruise/bleed easily.   Psychiatric/Behavioral: Negative for dysphoric mood and suicidal ideas.       Objective:      Physical Exam   Constitutional: She is oriented to person, place, and time. She appears well-developed and well-nourished. No distress.   HENT:   Right Ear: Tympanic membrane and ear canal normal.   Left Ear: Tympanic membrane and ear canal normal.   Nose: No sinus tenderness or nasal deformity.   Mouth/Throat: No oropharyngeal exudate or posterior oropharyngeal erythema.   Eyes:  Conjunctivae, EOM and lids are normal. Pupils are equal, round, and reactive to light.   Neck: Carotid bruit is not present. No thyromegaly present.   Cardiovascular: Normal rate, regular rhythm and intact distal pulses.    Pulmonary/Chest: Effort normal. No respiratory distress. She has wheezes.   Wheezes are very mild and patient reports doing well   Abdominal: Soft. Bowel sounds are normal. There is no tenderness.   Musculoskeletal: She exhibits no edema or tenderness.   Lymphadenopathy:        Head (right side): No submandibular adenopathy present.        Head (left side): No submandibular adenopathy present.     She has no cervical adenopathy.     She has no axillary adenopathy.        Right: No inguinal adenopathy present.        Left: No inguinal adenopathy present.   Neurological: She is alert and oriented to person, place, and time. She has normal strength.   Skin: Skin is intact. No lesion noted.   Psychiatric: She has a normal mood and affect. Her speech is normal and behavior is normal.       Assessment:       1. Type 2 diabetes mellitus with microalbuminuria, with long-term current use of insulin    2. Diabetic polyneuropathy associated with type 2 diabetes mellitus    3. Essential hypertension    4. Chronic kidney disease, stage 3, mod decreased GFR    5. Pure hypercholesterolemia    6. Iron deficiency anemia, unspecified iron deficiency anemia type    7. Thyroid nodule    8. B12 nutritional deficiency        Plan:       Myesha was seen today for follow-up.    Diagnoses and all orders for this visit:    Type 2 diabetes mellitus with microalbuminuria, with long-term current use of insulin: urine today and A1C in  3 months , A1C has improved to 7.5 and hypertension and statin are in range.  -     Microalbumin/creatinine urine ratio; Future  -     Hemoglobin A1c; Future    Diabetic polyneuropathy associated with type 2 diabetes mellitus  -     Ambulatory consult to Podiatry    Essential hypertension:  well controlled same meds follow K and cr  -     CBC auto differential; Future  -     Comprehensive metabolic panel; Future    Chronic kidney disease, stage 3, mod decreased GFR: stable and improved over the last month    Pure hypercholesterolemia: LDL below 79    Iron deficiency anemia, unspecified iron deficiency anemia type: work up for anemia  -     Iron and TIBC; Future  -     Ferritin; Future    Thyroid nodule: follow ultrasound  -     TSH; Future  -     US Soft Tissue Head Neck Thyroid; Future    B12 nutritional deficiency  -     Vitamin B12; Future      Return in about 3 months (around 8/16/2017) for FU with lab: cbc, cmp, lipid, tsh A1C B12 iron tibc.    New Prescriptions    No medications on file       Modified Medications    No medications on file       Orders Placed This Encounter   Procedures    US Soft Tissue Head Neck Thyroid     Standing Status:   Future     Standing Expiration Date:   5/16/2018     Order Specific Question:   May the Radiologist modify the order per protocol to meet the clinical needs of the patient?     Answer:   Yes    Microalbumin/creatinine urine ratio     Standing Status:   Future     Standing Expiration Date:   7/15/2017     Order Specific Question:   Specimen Source     Answer:   Urine    Iron and TIBC     Standing Status:   Future     Standing Expiration Date:   10/16/2017    Ferritin     Standing Status:   Future     Standing Expiration Date:   10/16/2017    Vitamin B12     Standing Status:   Future     Standing Expiration Date:   10/16/2017    TSH     Standing Status:   Future     Standing Expiration Date:   10/16/2017    CBC auto differential     Standing Status:   Future     Standing Expiration Date:   10/16/2017    Comprehensive metabolic panel     Standing Status:   Future     Standing Expiration Date:   10/16/2017    Hemoglobin A1c     Standing Status:   Future     Standing Expiration Date:   10/6/2017    Ambulatory consult to Podiatry     Referral Priority:    Routine     Referral Type:   Consultation     Referral Reason:   Specialty Services Required     Requested Specialty:   Podiatry     Number of Visits Requested:   1       Labs, studies and consults associated with this visit were reviewed

## 2017-05-18 ENCOUNTER — OFFICE VISIT (OUTPATIENT)
Dept: ENDOCRINOLOGY | Facility: CLINIC | Age: 77
End: 2017-05-18
Payer: MEDICARE

## 2017-05-18 ENCOUNTER — TELEPHONE (OUTPATIENT)
Dept: PHARMACY | Facility: CLINIC | Age: 77
End: 2017-05-18

## 2017-05-18 ENCOUNTER — OFFICE VISIT (OUTPATIENT)
Dept: INTERNAL MEDICINE | Facility: CLINIC | Age: 77
End: 2017-05-18
Payer: MEDICARE

## 2017-05-18 VITALS
BODY MASS INDEX: 28.64 KG/M2 | RESPIRATION RATE: 16 BRPM | HEIGHT: 63 IN | WEIGHT: 161.63 LBS | HEART RATE: 88 BPM | SYSTOLIC BLOOD PRESSURE: 128 MMHG | DIASTOLIC BLOOD PRESSURE: 70 MMHG

## 2017-05-18 VITALS
DIASTOLIC BLOOD PRESSURE: 76 MMHG | HEIGHT: 63 IN | HEART RATE: 80 BPM | SYSTOLIC BLOOD PRESSURE: 130 MMHG | WEIGHT: 162.13 LBS | BODY MASS INDEX: 28.73 KG/M2

## 2017-05-18 DIAGNOSIS — E11.42 DIABETIC POLYNEUROPATHY ASSOCIATED WITH TYPE 2 DIABETES MELLITUS: ICD-10-CM

## 2017-05-18 DIAGNOSIS — D32.9 MENINGIOMA: ICD-10-CM

## 2017-05-18 DIAGNOSIS — J45.30 ASTHMA, CHRONIC, MILD PERSISTENT, UNCOMPLICATED: ICD-10-CM

## 2017-05-18 DIAGNOSIS — T45.1X5A CHEMOTHERAPY-INDUCED NEUROPATHY: ICD-10-CM

## 2017-05-18 DIAGNOSIS — I70.0 CALCIFICATION OF AORTA: ICD-10-CM

## 2017-05-18 DIAGNOSIS — E78.00 PURE HYPERCHOLESTEROLEMIA: ICD-10-CM

## 2017-05-18 DIAGNOSIS — Z79.4 TYPE 2 DIABETES MELLITUS WITH MICROALBUMINURIA, WITH LONG-TERM CURRENT USE OF INSULIN: ICD-10-CM

## 2017-05-18 DIAGNOSIS — I10 ESSENTIAL HYPERTENSION: ICD-10-CM

## 2017-05-18 DIAGNOSIS — E11.29 TYPE 2 DIABETES MELLITUS WITH MICROALBUMINURIA, WITH LONG-TERM CURRENT USE OF INSULIN: ICD-10-CM

## 2017-05-18 DIAGNOSIS — I42.8 NONISCHEMIC CARDIOMYOPATHY: ICD-10-CM

## 2017-05-18 DIAGNOSIS — I50.22 CHRONIC SYSTOLIC HEART FAILURE: ICD-10-CM

## 2017-05-18 DIAGNOSIS — M81.0 OSTEOPOROSIS, UNSPECIFIED OSTEOPOROSIS TYPE, UNSPECIFIED PATHOLOGICAL FRACTURE PRESENCE: ICD-10-CM

## 2017-05-18 DIAGNOSIS — Z95.810 BIVENTRICULAR ICD (IMPLANTABLE CARDIOVERTER-DEFIBRILLATOR) IN PLACE: ICD-10-CM

## 2017-05-18 DIAGNOSIS — R80.9 TYPE 2 DIABETES MELLITUS WITH MICROALBUMINURIA, WITH LONG-TERM CURRENT USE OF INSULIN: ICD-10-CM

## 2017-05-18 DIAGNOSIS — G47.33 OSA (OBSTRUCTIVE SLEEP APNEA): ICD-10-CM

## 2017-05-18 DIAGNOSIS — E04.1 THYROID NODULE: ICD-10-CM

## 2017-05-18 DIAGNOSIS — G62.0 CHEMOTHERAPY-INDUCED NEUROPATHY: ICD-10-CM

## 2017-05-18 DIAGNOSIS — E11.42 DIABETIC POLYNEUROPATHY ASSOCIATED WITH TYPE 2 DIABETES MELLITUS: Primary | ICD-10-CM

## 2017-05-18 DIAGNOSIS — N18.30 CHRONIC KIDNEY DISEASE, STAGE 3, MOD DECREASED GFR: ICD-10-CM

## 2017-05-18 DIAGNOSIS — I25.10 CORONARY ARTERY DISEASE INVOLVING NATIVE CORONARY ARTERY OF NATIVE HEART WITHOUT ANGINA PECTORIS: ICD-10-CM

## 2017-05-18 DIAGNOSIS — Z00.00 ENCOUNTER FOR PREVENTIVE HEALTH EXAMINATION: Primary | ICD-10-CM

## 2017-05-18 DIAGNOSIS — E04.2 NONTOXIC MULTINODULAR GOITER: ICD-10-CM

## 2017-05-18 PROCEDURE — 3078F DIAST BP <80 MM HG: CPT | Mod: S$GLB,,, | Performed by: NURSE PRACTITIONER

## 2017-05-18 PROCEDURE — 1160F RVW MEDS BY RX/DR IN RCRD: CPT | Mod: S$GLB,,, | Performed by: NURSE PRACTITIONER

## 2017-05-18 PROCEDURE — 99214 OFFICE O/P EST MOD 30 MIN: CPT | Mod: S$GLB,,, | Performed by: NURSE PRACTITIONER

## 2017-05-18 PROCEDURE — 3074F SYST BP LT 130 MM HG: CPT | Mod: S$GLB,,, | Performed by: NURSE PRACTITIONER

## 2017-05-18 PROCEDURE — 1159F MED LIST DOCD IN RCRD: CPT | Mod: S$GLB,,, | Performed by: NURSE PRACTITIONER

## 2017-05-18 PROCEDURE — 99499 UNLISTED E&M SERVICE: CPT | Mod: S$GLB,,, | Performed by: NURSE PRACTITIONER

## 2017-05-18 PROCEDURE — 99999 PR PBB SHADOW E&M-EST. PATIENT-LVL IV: CPT | Mod: PBBFAC,,, | Performed by: NURSE PRACTITIONER

## 2017-05-18 PROCEDURE — 1126F AMNT PAIN NOTED NONE PRSNT: CPT | Mod: S$GLB,,, | Performed by: NURSE PRACTITIONER

## 2017-05-18 PROCEDURE — G0439 PPPS, SUBSEQ VISIT: HCPCS | Mod: S$GLB,,, | Performed by: NURSE PRACTITIONER

## 2017-05-18 PROCEDURE — 99999 PR PBB SHADOW E&M-EST. PATIENT-LVL III: CPT | Mod: PBBFAC,,, | Performed by: NURSE PRACTITIONER

## 2017-05-18 PROCEDURE — 3075F SYST BP GE 130 - 139MM HG: CPT | Mod: S$GLB,,, | Performed by: NURSE PRACTITIONER

## 2017-05-18 RX ORDER — INSULIN GLARGINE 100 [IU]/ML
INJECTION, SOLUTION SUBCUTANEOUS
Qty: 3 BOX | Refills: 3
Start: 2017-05-18 | End: 2018-02-15 | Stop reason: SDUPTHER

## 2017-05-18 NOTE — PROGRESS NOTES
"Myesha Quinones presented for a  Medicare AWV and comprehensive Health Risk Assessment today. The following components were reviewed and updated:    · Medical history  · Family History  · Social history  · Allergies and Current Medications  · Health Risk Assessment  · Health Maintenance  · Care Team     ** See Completed Assessments for Annual Wellness Visit within the encounter summary.**       The following assessments were completed:  · Living Situation  · Depression Screening  · Timed Get Up and Go  · Whisper Test  · Cognitive Function Screening        · Nutrition Screening  · ADL Screening  · PAQ Screening    Vitals:    05/18/17 1044   BP: 128/70   BP Location: Left arm   Patient Position: Sitting   BP Method: Manual   Pulse: 88   Resp: 16   Weight: 73.3 kg (161 lb 9.6 oz)   Height: 5' 3" (1.6 m)     Body mass index is 28.63 kg/(m^2).  Physical Exam   Constitutional: She is oriented to person, place, and time. She appears well-developed and well-nourished.   HENT:   Head: Normocephalic and atraumatic.   Mouth/Throat: Oropharynx is clear and moist.   Eyes: Pupils are equal, round, and reactive to light.   Neck: Normal range of motion. Neck supple.   Cardiovascular: Normal rate, regular rhythm, normal heart sounds and intact distal pulses.  Exam reveals no gallop and no friction rub.    No murmur heard.  Bilateral ankle +1 edema noted L>R   Pulmonary/Chest: Effort normal and breath sounds normal. No respiratory distress. She has no wheezes.   Abdominal: Soft. Bowel sounds are normal. She exhibits no distension. There is no tenderness.   Musculoskeletal: Normal range of motion.   Neurological: She is alert and oriented to person, place, and time.   Skin: Skin is warm and dry.   Psychiatric: She has a normal mood and affect. Her behavior is normal.   Nursing note and vitals reviewed.        Diagnoses and health risks identified today and associated recommendations/orders:    1. Encounter for preventive health " examination      2. Diabetic polyneuropathy associated with type 2 diabetes mellitus  Stable and controlled. Continue current treatment plan as previously prescribed by Endocrine.       3. Type 2 diabetes mellitus with microalbuminuria, with long-term current use of insulin  Stable and controlled. Continue current treatment plan as previously prescribed by Endocrine.       4. Calcification of aorta  Stable and controlled on daily aspirin and statin. Continue current treatment plan as previously prescribed by PCP.       5. Chronic systolic heart failure  Stable and controlled. Continue current treatment plan as previously prescribed by Cardiology.       6. Meningioma  Stable and controlled. Continue current treatment plan as previously prescribed by PCP.       7. Nonischemic cardiomyopathy  Stable and controlled. Continue current treatment plan as previously prescribed by Cardiology.       8. Essential hypertension  Stable and controlled. Continue current treatment plan as previously prescribed by PCP.       9. Pure hypercholesterolemia  Stable and controlled with daily aspirin and statin. Continue current treatment plan as previously prescribed by PCP.       10. Asthma, chronic, mild persistent, uncomplicated  Stable and controlled. Continue current treatment plan as previously prescribed by PCP.       11. Biventricular ICD (implantable cardioverter-defibrillator) in place  Stable and controlled. Continue current treatment plan as previously prescribed by PCP.       12. Chronic kidney disease, stage 3, mod decreased GFR  Stable and controlled. Continue current treatment plan as previously prescribed by PCP.       13. Nontoxic multinodular goiter  Stable and controlled. Continue current treatment plan as previously prescribed by PCP.       14. Coronary artery disease involving native coronary artery of native heart without angina pectoris  Stable and controlled. Continue current treatment plan as previously  prescribed by PCP.       15. Osteoporosis, unspecified osteoporosis type, unspecified pathological fracture presence  Stable and controlled. Continue current treatment plan as previously prescribed by PCP.       16. Chemotherapy-induced neuropathy  Stable and controlled. Continue current treatment plan as previously prescribed by Hem/Onc.           Provided Myesha with a 5-10 year written screening schedule and personal prevention plan. Recommendations were developed using the USPSTF age appropriate recommendations. Education, counseling, and referrals were provided as needed. After Visit Summary printed and given to patient which includes a list of additional screenings\tests needed.    Return in about 3 months (around 8/16/2017) for Follow up with PCP, SOONER IF NEEDED, HRA VISIT IN 1 YEAR.    MELISA HillsC

## 2017-05-18 NOTE — PATIENT INSTRUCTIONS
Counseling and Referral of Other Preventative  (Italic type indicates deductible and co-insurance are waived)    Patient Name: Myesha Quinones  Today's Date: 5/18/2017      SERVICE LIMITATIONS RECOMMENDATION    Vaccines    · Pneumococcal (once after 65)    · Influenza (annually)    · Hepatitis B (if medium/high risk)    · Prevnar 13  · Tdap  · Zoster      Hepatitis B medium/high risk factors:       - End-stage renal disease       - Hemophiliacs who received Factor VII or         IX concentrates       - Clients of institutions for the mentally             retarded       - Persons who live in the same house as          a HepB carrier       - Homosexual men       - Illicit injectable drug abusers     Pneumococcal: Done, no repeat [id number]/05/2013     Influenza: Done, repeat in one year11/09/2016     Hepatitis B: N/A     Prevnar 13: Done, no repeat ehjqvgezh59/23/2015    Tdap: 07/22/2014    Zoster: 04/02/2009    Mammogram (biennial age 50-74)  Annually (age 40 or over)  Last done 03/23/2017, recommend to repeat every 1  years    Pap (up to age 70 and after 70 if unknown history or abnormal study last 10 years)    N/A     The USPSTF recommends against screening for cervical cancer in women who have had a hysterectomy with removal of the cervix and who do not have a history of a high-grade precancerous lesion (cervical intraepithelial neoplasia [ANGUS] grade 2 or 3) or cervical cancer.     Colorectal cancer screening (to age 75)    · Fecal occult blood test (annual)  · Flexible sigmoidoscopy (5y)  · Screening colonoscopy (10y)  · Barium enema   Last done 08/27/2014, recommend to repeat every 5  years    Diabetes self-management training (no USPSTF recommendations)  Requires referral by treating physician for patient with diabetes or renal disease. 10 hours of initial DSMT sessions of no less than 30 minutes each in a continuous 12-month period. 2 hours of follow-up DSMT in subsequent years.  Recommended to patient,  declined    Bone mass measurements (age 65 & older, biennial)  Requires diagnosis related to osteoporosis or estrogen deficiency. Biennial benefit unless patient has history of long-term glucocorticoid  Last done 12/02/2015, recommend to repeat every 2  years    Glaucoma screening (no USPSTF recommendation)  Diabetes mellitus, family history   , age 50 or over    American, age 65 or over  Last done 10/18/2016, recommend to repeat every 1  years    Medical nutrition therapy for diabetes or renal disease (no recommended schedule)  Requires referral by treating physician for patient with diabetes or renal disease or kidney transplant within the past 3 years.  Can be provided in same year as diabetes self-management training (DSMT), and CMS recommends medical nutrition therapy take place after DSMT. Up to 3 hours for initial year and 2 hours in subsequent years.  Recommended to patient, declined    Cardiovascular screening blood tests (every 5 years)  · Fasting lipid panel  Order as a panel if possible  Last done 05/11/2017, recommend to repeat every 5 years    Diabetes screening tests (at least every 3 years, Medicare covers annually or at 6-month intervals for prediabetic patients)  · Fasting blood sugar (FBS) or glucose tolerance test (GTT)  Patient must be diagnosed with one of the following:       - Hypertension       - Dyslipidemia       - Obesity (BMI 30kg/m2)       - Previous elevated impaired FBS or GTT       ... or any two of the following:       - Overweight (BMI 25 but <30)       - Family history of diabetes       - Age 65 or older       - History of gestational diabetes or birth of baby weighing more than 9 pounds  N/A       Abdominal aortic aneurysm screening (once)  · Sonogram   Limited to patients who meet one of the following criteria:       - Men who are 65-75 years old and have smoked more than 100 cigarette in their lifetime       - Anyone with a family history of abdominal  aortic aneurysm       - Anyone recommended for screening by the USPSTF  N/A    HIV screening (annually for increased risk patients)  · HIV-1 and HIV-2 by EIA, or CHERYL, rapid antibody test or oral mucosa transudate  Patients must be at increased risk for HIV infection per USPSTF guidelines or pregnant. Tests covered annually for patient at increased risk or as requested by the patient. Pregnant patients may receive up to 3 tests during pregnancy.  Risks discussed, screening is not recommended    Smoking cessation counseling (up to 8 sessions per year)  Patients must be asymptomatic of tobacco-related conditions to receive as a preventative service.  Non-smoker    Subsequent annual wellness visit  At least 12 months since last AWV  Return in one year     The following information is provided to all patients.  This information is to help you find resources for any of the problems found today that may be affecting your health:                Living healthy guide: www.[facility].[place].gov      Understanding Diabetes: www.diabetes.org      Eating healthy: www.cdc.gov/healthyweight      [facility] home safety checklist: www.cdc.gov/steadi/patient.html      Agency on Aging: www.goea.[place].gov      Alcoholics anonymous (AA): www.aa.org      Physical Activity: www.oanh.nih.gov/ns7dxmq      Tobacco use: www.quitwithusla.org

## 2017-05-18 NOTE — PROGRESS NOTES
"CC: Patient is here for management of Type 2 diabetes and review of medical conditions as listed in visit diagnosis.      HPI: Mrs. Myesha Quinones is a 77 y.o. White female who was diagnosed with Type 2 DM in 1992. She has a FH of diabetes, reporting her 3 children all have diabetes. No hospitalizations for DM. Reports she went into the coverage gap in August 2016 and at that time the cost of Tradjenta was $1,300, but she was able to obtain the medication through patient assistance.     CURRENT DIABETIC MEDS: Lantus 46 units QHS, Tradjenta 5 mg daily, Glimepiride 2 mg bid, Metformin 500 mg with breakfast, 500 mg with lunch, 1,000 PM with dinner     At her last visit there were no changes to her regimen. Since then her A1C has trended down.     Monitoring BG up to 2x/day. Logs show readings are       Feels sweaty at times in the morning. This is treated and resolved with orange juice.     No missed doses of diabetes medications.     Active with yardwork. Snacks between meals on walnuts or raul crackers at times.     Last Podiatry Exam: N/A    REVIEW OF SYSTEMS  General: no weakness, fatigue, or weight changes.   Eyes: no double or blurred vision, eye pain, or redness; last eye exam=October 18, 2016 with Dr. Young.   Cardiovascular: no chest pain, palpitations, edema, or murmurs.   Respiratory: no cough or dyspnea.   GI: no heartburn, nausea, or changes in bowel patterns; good appetite.   Skin: no rashes, dryness, itching, or reactions at insulin injection sites.   Neuro: no numbness, tingling, tremors, or vertigo.   Endocrine: no polyuria, polydipsia, polyphagia, heat or cold intolerance.     Vital Signs  /76 (BP Location: Right arm, Patient Position: Sitting)  Pulse 80  Ht 5' 3" (1.6 m)  Wt 73.5 kg (162 lb 1.6 oz)  BMI 28.71 kg/m2    Hemoglobin A1C   Date Value Ref Range Status   05/11/2017 7.5 (H) 4.5 - 6.2 % Final     Comment:     According to ADA guidelines, hemoglobin A1C <7.0% " represents  optimal control in non-pregnant diabetic patients.  Different  metrics may apply to specific populations.   Standards of Medical Care in Diabetes - 2016.  For the purpose of screening for the presence of diabetes:  <5.7%     Consistent with the absence of diabetes  5.7-6.4%  Consistent with increasing risk for diabetes   (prediabetes)  >or=6.5%  Consistent with diabetes  Currently no consensus exists for use of hemoglobin A1C  for diagnosis of diabetes for children.     05/11/2017 7.5 (H) 4.5 - 6.2 % Final     Comment:     According to ADA guidelines, hemoglobin A1C <7.0% represents  optimal control in non-pregnant diabetic patients.  Different  metrics may apply to specific populations.   Standards of Medical Care in Diabetes - 2016.  For the purpose of screening for the presence of diabetes:  <5.7%     Consistent with the absence of diabetes  5.7-6.4%  Consistent with increasing risk for diabetes   (prediabetes)  >or=6.5%  Consistent with diabetes  Currently no consensus exists for use of hemoglobin A1C  for diagnosis of diabetes for children.     02/03/2017 8.1 (H) 4.5 - 6.2 % Final     Comment:     According to ADA guidelines, hemoglobin A1C <7.0% represents  optimal control in non-pregnant diabetic patients.  Different  metrics may apply to specific populations.   Standards of Medical Care in Diabetes - 2016.  For the purpose of screening for the presence of diabetes:  <5.7%     Consistent with the absence of diabetes  5.7-6.4%  Consistent with increasing risk for diabetes   (prediabetes)  >or=6.5%  Consistent with diabetes  Currently no consensus exists for use of hemoglobin A1C  for diagnosis of diabetes for children.        Lab Results   Component Value Date    CREATININE 1.2 05/11/2017    CREATININE 1.2 05/11/2017      Lab Results   Component Value Date    TSH 0.739 01/19/2017      Lab Results   Component Value Date    LDLCALC 39.8 (L) 05/11/2017        PHYSICAL EXAMINATION  Constitutional:  Appears well, no distress  Neck: Supple, trachea midline.   Respiratory: CTA without wheezes, even and unlabored.  Cardiovascular: RRR; no carotid bruits or murmurs.   Lymph: DP pulses  2+ bilaterally; no edema.   Skin: warm and dry; no injection site reactions, no acanthosis nigracans observed.  Neuro:patient alert and cooperative, normal affect.    Diabetes Foot Exam:   Visual Inspection:  Normal -  Bilateral, Nails Intact - without Evidence of Foot Deformity- Bilateral, Callus -  Neither and no interdigital maceration.     Pedal Pulses (DP):   Right: Present  Left: Present    Assessment/Plan    1. Diabetic polyneuropathy associated with type 2 diabetes mellitus:   DM improving.   Decrease lantus to 40 units and continue current oral medications.   Monitor BG 3x/day.   Logs to next visit.    2. Type 2 diabetes mellitus with microalbuminuria, with long-term current use of insulin: Improve diabetes control. Monitor urine microalbumin yearly.    3. Chronic systolic heart failure: Managed by Cardiology.    4. Osteoporosis: Continue Vitamin D 2,000 iu daily.     5. Pure hypercholesterolemia: Continue Zocor, Fish Oil.     6. Essential hypertension: Continue Coreg, Apresoline, Diovan. Managed by Cardiology.    7. Nontoxic multinodular goiter: Monitoring yearly. Previously had FNA of left thyroid nodule 6/23/16, right thyroid nodule 4/2014. Clinically euthyroid.    8. KHOA (obstructive sleep apnea): Continue nightly c-pap use.      FOLLOW UP  Return in about 4 months (around 9/18/2017).

## 2017-05-18 NOTE — MR AVS SNAPSHOT
[facility] - Endocrinology  [address]  [facility] [address]  Phone: [phone]                  Myesha Quinones   2017 9:30 AM   Office Visit    Description:  Female : 1940   Provider:  Beny Kemp DNP, NP   Department:  [facility] - Endocrinology           Reason for Visit     Diabetes Mellitus           Diagnoses this Visit        Comments    Diabetic polyneuropathy associated with type 2 diabetes mellitus    -  Primary     Type 2 diabetes mellitus with microalbuminuria, with long-term current use of insulin         Chronic systolic heart failure         Osteoporosis, unspecified osteoporosis type, unspecified pathological fracture presence         Pure hypercholesterolemia         Essential hypertension         Thyroid nodule         KHOA (obstructive sleep apnea)                To Do List           Future Appointments        Provider Department Dept Phone    2017 11:00 AM HRA, NOM 4 [facility] - Internal Medicine [phone]    2017 10:15 AM Katey Gray DPM [place] - Podiatry [phone]    2017 1:00 PM EKG, APPT [facility] - [phone]    2017 1:30 PM Luisana Spain, MONA [facility] - Arrhythmia [phone]    2017 9:30 AM [facility] 11 ALL [facility] [phone]      Goals (5 Years of Data)     None      Follow-Up and Disposition     Return in about 4 months (around 2017).    Follow-up and Disposition History       These Medications        Disp Refills Start End    insulin glargine (LANTUS SOLOSTAR) 100 unit/mL (3 mL) InPn pen 3 Box 3 2017     INJECT 40 UNITS INTO THE SKIN EVERY EVENING.    Pharmacy: [facility]/pharmacy #[id number] - [place] - [address] AT [facility] Ph #: [phone]         [facility]     [facility] Care [facility] -  Assistance  Unless otherwise directed by your provider, please contact [facility], our nurse care line that is available for   assistance.     Registered nurses in the [facility] provide: appointment scheduling, clinical advisement, health education, and other advisory services.  Call: [phone] (toll free)               Medications           Message regarding Medications     Verify the changes and/or additions to your medication regime listed below are the same as discussed with your clinician today.  If any of these changes or additions are incorrect, please notify your healthcare provider.        CHANGE how you are taking these medications     Start Taking Instead of    insulin glargine (LANTUS SOLOSTAR) 100 unit/mL (3 mL) InPn pen insulin glargine (LANTUS SOLOSTAR) 100 unit/mL (3 mL) InPn pen    Dosage:  INJECT 40 UNITS INTO THE SKIN EVERY EVENING. Dosage:  INJECT 46 UNITS INTO THE SKIN EVERY EVENING.    Reason for Change:  Reorder            Verify that the below list of medications is an accurate representation of the medications you are currently taking.  If none reported, the list may be blank. If incorrect, please contact your healthcare provider. Carry this list with you in case of emergency.           Current Medications     aspirin (ENTERIC COATED ASPIRIN) 81 MG EC tablet Take 1 tablet by mouth.    blood sugar diagnostic (ACCU-CHEK HECTOR) Strp Uses Accu-Check Hector meter to test BG 4x/day    blood sugar diagnostic (ONETOUCH ULTRA TEST) Strp To use as directed with One Touch Ultra Meter and monitor blood sugars TID    blood-glucose meter kit Dispense Accu-Chek Hector meter. Use as instructed    carvedilol (COREG) 25 MG tablet TAKE 2 TABLETS BY MOUTH TWICE A DAY    chlorthalidone (HYGROTEN) 25 MG Tab Take 1 tablet (25 mg total) by mouth once daily.    fluticasone-salmeterol 250-50 mcg/dose (ADVAIR) 250-50 mcg/dose diskus inhaler Inhale 1 puff into the lungs 2 (two) times daily. This is a change from one month    glimepiride (AMARYL) 2 MG tablet TAKE 1 TABLET BY MOUTH TWICE A DAY    hydrALAZINE (APRESOLINE) 100 MG  "tablet Take 1 tablet (100 mg total) by mouth 3 (three) times daily.    insulin glargine (LANTUS SOLOSTAR) 100 unit/mL (3 mL) InPn pen INJECT 40 UNITS INTO THE SKIN EVERY EVENING.    lancets (ACCU-CHEK SOFTCLIX LANCETS) Misc Uses Accu-Chek Angelica meter to test BG 4x/day    linagliptin (TRADJENTA) 5 mg Tab tablet Take 1 tablet (5 mg total) by mouth once daily.    metformin (GLUCOPHAGE) 500 MG tablet Take 2 tablets (1,000 mg total) by mouth 2 (two) times daily with meals.    nitroGLYCERIN (NITROSTAT) 0.4 MG SL tablet Place 1 tablet under the tongue continuous prn.      omega-3 fatty acids (FISH OIL) 500 mg Cap Take 1 capsule by mouth Twice daily.    pen needle, diabetic (BD INSULIN PEN NEEDLE UF MINI) 31 gauge x 3/16" Ndle USE WITH LANTUS AT BEDTIME    simvastatin (ZOCOR) 20 MG tablet Take 1 tablet (20 mg total) by mouth every evening.    valsartan (DIOVAN) 320 MG tablet Take 1 tablet (320 mg total) by mouth once daily.    albuterol (PROVENTIL) 2.5 mg /3 mL (0.083 %) nebulizer solution Take 3 mLs (2.5 mg total) by nebulization every 6 (six) hours as needed for Wheezing.    azelastine (ASTELIN) 137 mcg (0.1 %) nasal spray 1 spray (137 mcg total) by Nasal route 2 (two) times daily. For severe allergy           Clinical Reference Information           Your Vitals Were     BP Pulse Height Weight BMI    130/76 (BP Location: Right arm, Patient Position: Sitting) 80 5' 3" (1.6 m) 73.5 kg (162 lb 1.6 oz) 28.71 kg/m2      Blood Pressure          Most Recent Value    BP  130/76      Allergies as of 5/18/2017     Iodine And Iodide Containing Products      Immunizations Administered on Date of Encounter - 5/18/2017     None      [facility] Sign-Up     Activating your [facility] account is as easy as 1-2-3!     1) Visit [email or web address], select Sign Up Now, enter this activation code and your date of birth, then select Next.  [email or web address]  Expires: 6/30/2017  8:05 AM      2) Create a username and password to use when you visit " [facility] in the future and select a security question in case you lose your password and select Next.    3) Enter your e-mail address and click Sign Up!    Additional Information  If you have questions, please e-mail [email or web address] or call [phone] to talk to our [facility] staff. Remember, [facility] is NOT to be used for urgent needs. For medical emergencies, dial 911.         Language Assistance Services     ATTENTION: Language assistance services are available, free of charge. Please call [phone].      ATENCIÓN: Si habla español, tiene a frye disposición servicios gratuitos de asistencia lingüística. Llame al [phone].     CHÚ Ý: N?u b?n nói Ti?ng Vi?t, có các d?ch v? h? tr? ngôn ng? mi?n phí dành cho b?n. G?i s? [phone].         [facility] complies with applicable Federal civil rights laws and does not discriminate on the basis of race, color, national origin, age, disability, or sex.

## 2017-05-18 NOTE — MR AVS SNAPSHOT
[facility] Medicine  [address]  [facility] [address]  Phone: [phone]  Fax: [phone]                  Myesha Quinones   2017 11:00 AM   Office Visit    Description:  Female : 1940   Provider:  LENY ZARAGOZA   Department:  [facility] - Internal Medicine           Reason for Visit     Health Risk Assessment           Diagnoses this Visit        Comments    Encounter for preventive health examination    -  Primary            To Do List           Future Appointments        Provider Department Dept Phone    2017 10:15 AM Katey Gray DPM [place] - Podiatry [phone]    2017 1:00 PM EKG, APPT [facility] [phone]    2017 1:30 PM Luisana Spain, SRINIP [facility] - Arrhythmia [phone]    2017 9:30 AM [facility] 11 ALL [facility] [phone]    2017 9:30 AM Jose Luis Fernando MD [facility] - Cardiology [phone]      Goals (5 Years of Data)     None      Follow-Up and Disposition     Return in about 3 months (around 2017) for Follow up with PCP, SOONER IF NEEDED, HRA VISIT IN 1 YEAR.      [facility]     [facility] -  Assistance  Unless otherwise directed by your provider, please contact [facility], our nurse care line that is available for  assistance.     Registered nurses in the [facility] provide: appointment scheduling, clinical advisement, health education, and other advisory services.  Call: [phone] (toll free)               Medications           Message regarding Medications     Verify the changes and/or additions to your medication regime listed below are the same as discussed with your clinician today.  If any of these changes or additions are incorrect, please notify your healthcare provider.             Verify that the below list of medications is an accurate representation of the medications you are currently taking.  If none reported, the list may  "be blank. If incorrect, please contact your healthcare provider. Carry this list with you in case of emergency.           Current Medications     aspirin (ENTERIC COATED ASPIRIN) 81 MG EC tablet Take 1 tablet by mouth.    blood sugar diagnostic (ACCU-CHEK HECTOR) Strp Uses Accu-Check Hector meter to test BG 4x/day    blood sugar diagnostic (ONETOUCH ULTRA TEST) Strp To use as directed with One Touch Ultra Meter and monitor blood sugars TID    blood-glucose meter kit Dispense Accu-Chek Hector meter. Use as instructed    carvedilol (COREG) 25 MG tablet TAKE 2 TABLETS BY MOUTH TWICE A DAY    chlorthalidone (HYGROTEN) 25 MG Tab Take 1 tablet (25 mg total) by mouth once daily.    fluticasone-salmeterol 250-50 mcg/dose (ADVAIR) 250-50 mcg/dose diskus inhaler Inhale 1 puff into the lungs 2 (two) times daily. This is a change from one month    glimepiride (AMARYL) 2 MG tablet TAKE 1 TABLET BY MOUTH TWICE A DAY    hydrALAZINE (APRESOLINE) 100 MG tablet Take 1 tablet (100 mg total) by mouth 3 (three) times daily.    insulin glargine (LANTUS SOLOSTAR) 100 unit/mL (3 mL) InPn pen INJECT 40 UNITS INTO THE SKIN EVERY EVENING.    lancets (ACCU-CHEK SOFTCLIX LANCETS) Misc Uses Accu-Chek Hector meter to test BG 4x/day    linagliptin (TRADJENTA) 5 mg Tab tablet Take 1 tablet (5 mg total) by mouth once daily.    metformin (GLUCOPHAGE) 500 MG tablet Take 2 tablets (1,000 mg total) by mouth 2 (two) times daily with meals.    nitroGLYCERIN (NITROSTAT) 0.4 MG SL tablet Place 1 tablet under the tongue continuous prn.      omega-3 fatty acids (FISH OIL) 500 mg Cap Take 1 capsule by mouth Twice daily.    pen needle, diabetic (BD INSULIN PEN NEEDLE UF MINI) 31 gauge x 3/16" Ndle USE WITH LANTUS AT BEDTIME    simvastatin (ZOCOR) 20 MG tablet Take 1 tablet (20 mg total) by mouth every evening.    valsartan (DIOVAN) 320 MG tablet Take 1 tablet (320 mg total) by mouth once daily.    albuterol (PROVENTIL) 2.5 mg /3 mL (0.083 %) nebulizer solution Take " "3 mLs (2.5 mg total) by nebulization every 6 (six) hours as needed for Wheezing.    azelastine (ASTELIN) 137 mcg (0.1 %) nasal spray 1 spray (137 mcg total) by Nasal route 2 (two) times daily. For severe allergy           Clinical Reference Information           Your Vitals Were     BP Pulse Resp Height Weight BMI    128/70 (BP Location: Left arm, Patient Position: Sitting, BP Method: Manual) 88 16 5' 3" (1.6 m) 73.3 kg (161 lb 9.6 oz) 28.63 kg/m2      Blood Pressure          Most Recent Value    BP  128/70      Allergies as of 5/18/2017     Iodine And Iodide Containing Products      Immunizations Administered on Date of Encounter - 5/18/2017     None      [facility] Sign-Up     Activating your [facility] account is as easy as 1-2-3!     1) Visit [email or web address], select Sign Up Now, enter this activation code and your date of birth, then select Next.  [email or web address]  Expires: 6/30/2017  8:05 AM      2) Create a username and password to use when you visit [facility] in the future and select a security question in case you lose your password and select Next.    3) Enter your e-mail address and click Sign Up!    Additional Information  If you have questions, please e-mail [email or web address] or call [phone] to talk to our [facility] staff. Remember, [facility] is NOT to be used for urgent needs. For medical emergencies, dial 911.         Instructions      Counseling and Referral of Other Preventative  (Italic type indicates deductible and co-insurance are waived)    Patient Name: Myesha Quinones  Today's Date: 5/18/2017      SERVICE LIMITATIONS RECOMMENDATION    Vaccines    · Pneumococcal (once after 65)    · Influenza (annually)    · Hepatitis B (if medium/high risk)    · Prevnar 13  · Tdap  · Zoster      Hepatitis B medium/high risk factors:       - End-stage renal disease       - Hemophiliacs who received Factor VII or         IX concentrates       - Clients of institutions for the mentally             " retarded       - Persons who live in the same house as          a HepB carrier       - Homosexual men       - Illicit injectable drug abusers     Pneumococcal: Done, no repeat [id number]/05/2013     Influenza: Done, repeat in one year11/09/2016     Hepatitis B: N/A     Prevnar 13: Done, no repeat [id number]/23/2015    Tdap: 07/22/2014    Zoster: 04/02/2009    Mammogram (biennial age 50-74)  Annually (age 40 or over)  Last done 03/23/2017, recommend to repeat every 1  years    Pap (up to age 70 and after 70 if unknown history or abnormal study last 10 years)    N/A     The USPSTF recommends against screening for cervical cancer in women who have had a hysterectomy with removal of the cervix and who do not have a history of a high-grade precancerous lesion (cervical intraepithelial neoplasia [ANGUS] grade 2 or 3) or cervical cancer.     Colorectal cancer screening (to age 75)    · Fecal occult blood test (annual)  · Flexible sigmoidoscopy (5y)  · Screening colonoscopy (10y)  · Barium enema   Last done 08/27/2014, recommend to repeat every 5  years    Diabetes self-management training (no USPSTF recommendations)  Requires referral by treating physician for patient with diabetes or renal disease. 10 hours of initial DSMT sessions of no less than 30 minutes each in a continuous 12-month period. 2 hours of follow-up DSMT in subsequent years.  Recommended to patient, declined    Bone mass measurements (age 65 & older, biennial)  Requires diagnosis related to osteoporosis or estrogen deficiency. Biennial benefit unless patient has history of long-term glucocorticoid  Last done 12/02/2015, recommend to repeat every 2  years    Glaucoma screening (no USPSTF recommendation)  Diabetes mellitus, family history   , age 50 or over    American, age 65 or over  Last done 10/18/2016, recommend to repeat every 1  years    Medical nutrition therapy for diabetes or renal disease (no recommended schedule)   Requires referral by treating physician for patient with diabetes or renal disease or kidney transplant within the past 3 years.  Can be provided in same year as diabetes self-management training (DSMT), and CMS recommends medical nutrition therapy take place after DSMT. Up to 3 hours for initial year and 2 hours in subsequent years.  Recommended to patient, declined    Cardiovascular screening blood tests (every 5 years)  · Fasting lipid panel  Order as a panel if possible  Last done 05/11/2017, recommend to repeat every 5 years    Diabetes screening tests (at least every 3 years, Medicare covers annually or at 6-month intervals for prediabetic patients)  · Fasting blood sugar (FBS) or glucose tolerance test (GTT)  Patient must be diagnosed with one of the following:       - Hypertension       - Dyslipidemia       - Obesity (BMI 30kg/m2)       - Previous elevated impaired FBS or GTT       ... or any two of the following:       - Overweight (BMI 25 but <30)       - Family history of diabetes       - Age 65 or older       - History of gestational diabetes or birth of baby weighing more than 9 pounds  N/A       Abdominal aortic aneurysm screening (once)  · Sonogram   Limited to patients who meet one of the following criteria:       - Men who are 65-75 years old and have smoked more than 100 cigarette in their lifetime       - Anyone with a family history of abdominal aortic aneurysm       - Anyone recommended for screening by the USPSTF  N/A    HIV screening (annually for increased risk patients)  · HIV-1 and HIV-2 by EIA, or CHERYL, rapid antibody test or oral mucosa transudate  Patients must be at increased risk for HIV infection per USPSTF guidelines or pregnant. Tests covered annually for patient at increased risk or as requested by the patient. Pregnant patients may receive up to 3 tests during pregnancy.  Risks discussed, screening is not recommended    Smoking cessation counseling (up to 8 sessions per year)   Patients must be asymptomatic of tobacco-related conditions to receive as a preventative service.  Non-smoker    Subsequent annual wellness visit  At least 12 months since last AWV  Return in one year     The following information is provided to all patients.  This information is to help you find resources for any of the problems found today that may be affecting your health:                Living healthy guide: www.[facility].[place]      Understanding Diabetes: www.diabetes.org      Eating healthy: www.cdc.gov/healthyweight      CDC home safety checklist: www.cdc.gov/steadi/patient.html      Agency on Aging: www.goea.[place]      Alcoholics anonymous (AA): www.aa.org      Physical Activity: www.oanh.nih.gov/qw6paka      Tobacco use: www.quitwithusla.org          Language Assistance Services     ATTENTION: Language assistance services are available, free of charge. Please call [phone].      ATENCIÓN: Si darlin alegria, tiene a frye disposición servicios gratuitos de asistencia lingüística. Llame al [phone].     CHÚ Ý: N?u b?n nói Ti?ng Vi?t, có các d?ch v? h? tr? ngôn ng? mi?n phí dành cho b?n. G?i s? [phone].         [facility] complies with applicable Federal civil rights laws and does not discriminate on the basis of race, color, national origin, age, disability, or sex.

## 2017-05-22 DIAGNOSIS — E11.42 DIABETIC SENSORIMOTOR NEUROPATHY: ICD-10-CM

## 2017-05-22 RX ORDER — METFORMIN HYDROCHLORIDE 500 MG/1
1000 TABLET ORAL 2 TIMES DAILY WITH MEALS
Qty: 180 TABLET | Refills: 3 | Status: SHIPPED | OUTPATIENT
Start: 2017-05-22 | End: 2018-01-03 | Stop reason: SDUPTHER

## 2017-05-22 NOTE — TELEPHONE ENCOUNTER
Spoke to patient and let her know that her Rx for metformin (GLUCOPHAGE) 500 MG tablet was sent to the pharmacy.

## 2017-05-22 NOTE — TELEPHONE ENCOUNTER
----- Message from Audrey Gomes sent at 5/22/2017  1:55 PM CDT -----  Contact: Myesha  tel: [phone]   Pls call [facility]  Tel:  [phone]   /  Caller says her MEtformin was supposed to have been called in since last Thursday.   Pt.says she is almost out of medication for this.  Pt. Asking if you will call her when you send it today.      Pls send it today. As per caller.

## 2017-05-23 ENCOUNTER — LAB VISIT (OUTPATIENT)
Dept: LAB | Facility: HOSPITAL | Age: 77
End: 2017-05-23
Attending: INTERNAL MEDICINE
Payer: MEDICARE

## 2017-05-23 DIAGNOSIS — Z79.4 TYPE 2 DIABETES MELLITUS WITH MICROALBUMINURIA, WITH LONG-TERM CURRENT USE OF INSULIN: ICD-10-CM

## 2017-05-23 DIAGNOSIS — E11.29 TYPE 2 DIABETES MELLITUS WITH MICROALBUMINURIA, WITH LONG-TERM CURRENT USE OF INSULIN: ICD-10-CM

## 2017-05-23 DIAGNOSIS — R80.9 TYPE 2 DIABETES MELLITUS WITH MICROALBUMINURIA, WITH LONG-TERM CURRENT USE OF INSULIN: ICD-10-CM

## 2017-05-23 LAB
CREAT UR-MCNC: 54 MG/DL
MICROALBUMIN UR DL<=1MG/L-MCNC: 110 UG/ML
MICROALBUMIN/CREATININE RATIO: 203.7 UG/MG

## 2017-05-23 PROCEDURE — 82570 ASSAY OF URINE CREATININE: CPT

## 2017-05-24 ENCOUNTER — TELEPHONE (OUTPATIENT)
Dept: INTERNAL MEDICINE | Facility: CLINIC | Age: 77
End: 2017-05-24

## 2017-05-24 RX ORDER — FLUTICASONE PROPIONATE AND SALMETEROL 250; 50 UG/1; UG/1
1 POWDER RESPIRATORY (INHALATION) 2 TIMES DAILY
Qty: 180 EACH | Refills: 3 | Status: SHIPPED | OUTPATIENT
Start: 2017-05-24 | End: 2018-08-12 | Stop reason: SDUPTHER

## 2017-05-25 ENCOUNTER — OFFICE VISIT (OUTPATIENT)
Dept: ELECTROPHYSIOLOGY | Facility: CLINIC | Age: 77
End: 2017-05-25
Payer: MEDICARE

## 2017-05-25 ENCOUNTER — HOSPITAL ENCOUNTER (OUTPATIENT)
Dept: CARDIOLOGY | Facility: CLINIC | Age: 77
Discharge: HOME | End: 2017-05-25
Payer: MEDICARE

## 2017-05-25 ENCOUNTER — OFFICE VISIT (OUTPATIENT)
Dept: PODIATRY | Facility: CLINIC | Age: 77
End: 2017-05-25
Payer: MEDICARE

## 2017-05-25 VITALS
BODY MASS INDEX: 28.64 KG/M2 | HEART RATE: 84 BPM | WEIGHT: 161.63 LBS | SYSTOLIC BLOOD PRESSURE: 165 MMHG | HEIGHT: 63 IN | DIASTOLIC BLOOD PRESSURE: 70 MMHG

## 2017-05-25 VITALS
BODY MASS INDEX: 28.82 KG/M2 | SYSTOLIC BLOOD PRESSURE: 170 MMHG | DIASTOLIC BLOOD PRESSURE: 78 MMHG | WEIGHT: 162.69 LBS | HEIGHT: 63 IN | HEART RATE: 80 BPM

## 2017-05-25 DIAGNOSIS — Z79.4 TYPE 2 DIABETES MELLITUS WITH MICROALBUMINURIA, WITH LONG-TERM CURRENT USE OF INSULIN: ICD-10-CM

## 2017-05-25 DIAGNOSIS — E78.00 PURE HYPERCHOLESTEROLEMIA: ICD-10-CM

## 2017-05-25 DIAGNOSIS — E11.29 TYPE 2 DIABETES MELLITUS WITH MICROALBUMINURIA, WITH LONG-TERM CURRENT USE OF INSULIN: ICD-10-CM

## 2017-05-25 DIAGNOSIS — I10 ESSENTIAL HYPERTENSION: ICD-10-CM

## 2017-05-25 DIAGNOSIS — E11.9 ENCOUNTER FOR DIABETIC FOOT EXAM: ICD-10-CM

## 2017-05-25 DIAGNOSIS — I25.10 CORONARY ARTERY DISEASE INVOLVING NATIVE CORONARY ARTERY OF NATIVE HEART WITHOUT ANGINA PECTORIS: ICD-10-CM

## 2017-05-25 DIAGNOSIS — Z95.810 BIVENTRICULAR ICD (IMPLANTABLE CARDIOVERTER-DEFIBRILLATOR) IN PLACE: ICD-10-CM

## 2017-05-25 DIAGNOSIS — I42.8 NICM (NONISCHEMIC CARDIOMYOPATHY): ICD-10-CM

## 2017-05-25 DIAGNOSIS — I42.8 NONISCHEMIC CARDIOMYOPATHY: Primary | ICD-10-CM

## 2017-05-25 DIAGNOSIS — G47.33 OSA (OBSTRUCTIVE SLEEP APNEA): ICD-10-CM

## 2017-05-25 DIAGNOSIS — G62.0 CHEMOTHERAPY-INDUCED NEUROPATHY: ICD-10-CM

## 2017-05-25 DIAGNOSIS — I44.7 LBBB (LEFT BUNDLE BRANCH BLOCK): ICD-10-CM

## 2017-05-25 DIAGNOSIS — R80.9 TYPE 2 DIABETES MELLITUS WITH MICROALBUMINURIA, WITH LONG-TERM CURRENT USE OF INSULIN: ICD-10-CM

## 2017-05-25 DIAGNOSIS — E11.49 TYPE II DIABETES MELLITUS WITH NEUROLOGICAL MANIFESTATIONS: Primary | ICD-10-CM

## 2017-05-25 DIAGNOSIS — N18.30 CHRONIC KIDNEY DISEASE, STAGE 3, MOD DECREASED GFR: ICD-10-CM

## 2017-05-25 DIAGNOSIS — J45.30 ASTHMA, CHRONIC, MILD PERSISTENT, UNCOMPLICATED: ICD-10-CM

## 2017-05-25 DIAGNOSIS — T45.1X5A CHEMOTHERAPY-INDUCED NEUROPATHY: ICD-10-CM

## 2017-05-25 DIAGNOSIS — I50.22 CHRONIC SYSTOLIC HEART FAILURE: ICD-10-CM

## 2017-05-25 PROCEDURE — 99499 UNLISTED E&M SERVICE: CPT | Mod: S$GLB,,, | Performed by: NURSE PRACTITIONER

## 2017-05-25 PROCEDURE — 1159F MED LIST DOCD IN RCRD: CPT | Mod: S$GLB,,, | Performed by: NURSE PRACTITIONER

## 2017-05-25 PROCEDURE — 93000 ELECTROCARDIOGRAM COMPLETE: CPT | Mod: S$GLB,,, | Performed by: INTERNAL MEDICINE

## 2017-05-25 PROCEDURE — 1159F MED LIST DOCD IN RCRD: CPT | Mod: S$GLB,,, | Performed by: PODIATRIST

## 2017-05-25 PROCEDURE — 99214 OFFICE O/P EST MOD 30 MIN: CPT | Mod: S$GLB,,, | Performed by: PODIATRIST

## 2017-05-25 PROCEDURE — 1126F AMNT PAIN NOTED NONE PRSNT: CPT | Mod: S$GLB,,, | Performed by: PODIATRIST

## 2017-05-25 PROCEDURE — 99214 OFFICE O/P EST MOD 30 MIN: CPT | Mod: S$GLB,,, | Performed by: NURSE PRACTITIONER

## 2017-05-25 PROCEDURE — 99999 PR PBB SHADOW E&M-EST. PATIENT-LVL III: CPT | Mod: PBBFAC,,, | Performed by: NURSE PRACTITIONER

## 2017-05-25 PROCEDURE — 99999 PR PBB SHADOW E&M-EST. PATIENT-LVL IV: CPT | Mod: PBBFAC,,, | Performed by: PODIATRIST

## 2017-05-25 PROCEDURE — 1126F AMNT PAIN NOTED NONE PRSNT: CPT | Mod: S$GLB,,, | Performed by: NURSE PRACTITIONER

## 2017-05-25 PROCEDURE — 99499 UNLISTED E&M SERVICE: CPT | Mod: S$GLB,,, | Performed by: PODIATRIST

## 2017-05-25 NOTE — PROGRESS NOTES
CC:     Foot exam       HPI:   The patient is a 77 y.o.  female  who presents for a diabetic foot exam.     Patient reports presence of abnormal sensation to the feet .  +numbness.  No pain   History of diabetic foot ulcers: none   History of foot surgery: none.     Shoes worn today:  sandals      Primary care doctor is: Emelina Gaston MD  Patient last saw primary care doctor on:    5/16/17        Past Medical History:   Diagnosis Date    Allergy     Asthma     Basal cell carcinoma     left forehead    Basal cell carcinoma     left nose    Basal cell carcinoma 05/20/2015    right nose    Basal cell carcinoma 12/22/2015    left lower post neck    CAD (coronary artery disease)     Cardiomyopathy     Cardiomyopathy, ischemic     Cataract     CHF (congestive heart failure)     Chronic kidney disease, stage 3, mod decreased GFR 2/14/2017    COPD (chronic obstructive pulmonary disease)     Defibrillator discharge     Diabetes mellitus     Diabetes mellitus type II     Diabetes with neurologic complications     Goiter     MNG    HX: breast cancer     Hyperlipidemia     Hypertension     Iron deficiency anemia 5/16/2017    Left kidney mass     Meningioma     Osteoporosis, postmenopausal     Pacemaker     Skin cancer     s/p excision    Sleep apnea     CPAP    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/2015    mid forehead    Unspecified vitamin D deficiency     Ventricular tachycardia     Vitamin B12 deficiency     Vitamin D deficiency disease          Current Outpatient Prescriptions on File Prior to Visit   Medication Sig Dispense Refill    aspirin (ENTERIC COATED ASPIRIN) 81 MG EC tablet Take 1 tablet by mouth.      blood sugar diagnostic (ACCU-CHEK HECTOR) Strp Uses Accu-Check Hector meter to test BG 4x/day (Patient taking differently: Uses Accu-Check Hector meter to test BG 4x/day checking 2x day) 400 strip 3    blood sugar diagnostic (ONETOUCH ULTRA TEST) Strp To use as directed with One Touch  "Ultra Meter and monitor blood sugars  strip 3    blood-glucose meter kit Dispense Accu-Chek Angelica meter. Use as instructed 1 each 0    carvedilol (COREG) 25 MG tablet TAKE 2 TABLETS BY MOUTH TWICE A  tablet 3    chlorthalidone (HYGROTEN) 25 MG Tab Take 1 tablet (25 mg total) by mouth once daily. 90 tablet 3    fluticasone-salmeterol 250-50 mcg/dose (ADVAIR) 250-50 mcg/dose diskus inhaler Inhale 1 puff into the lungs 2 (two) times daily. This is a change from one month 180 each 3    glimepiride (AMARYL) 2 MG tablet TAKE 1 TABLET BY MOUTH TWICE A  tablet 3    hydrALAZINE (APRESOLINE) 100 MG tablet Take 1 tablet (100 mg total) by mouth 3 (three) times daily. 90 tablet 11    insulin glargine (LANTUS SOLOSTAR) 100 unit/mL (3 mL) InPn pen INJECT 40 UNITS INTO THE SKIN EVERY EVENING. 3 Box 3    lancets (ACCU-CHEK SOFTCLIX LANCETS) Misc Uses Accu-Chek Angelica meter to test BG 4x/day 400 each 3    linagliptin (TRADJENTA) 5 mg Tab tablet Take 1 tablet (5 mg total) by mouth once daily. 90 tablet 3    metformin (GLUCOPHAGE) 500 MG tablet Take 2 tablets (1,000 mg total) by mouth 2 (two) times daily with meals. 180 tablet 3    nitroGLYCERIN (NITROSTAT) 0.4 MG SL tablet Place 1 tablet under the tongue continuous prn.        omega-3 fatty acids (FISH OIL) 500 mg Cap Take 1 capsule by mouth Twice daily.      pen needle, diabetic (BD INSULIN PEN NEEDLE UF MINI) 31 gauge x 3/16" Ndle USE WITH LANTUS AT BEDTIME 100 each 3    simvastatin (ZOCOR) 20 MG tablet Take 1 tablet (20 mg total) by mouth every evening. 90 tablet 3    valsartan (DIOVAN) 320 MG tablet Take 1 tablet (320 mg total) by mouth once daily. 90 tablet 3    albuterol (PROVENTIL) 2.5 mg /3 mL (0.083 %) nebulizer solution Take 3 mLs (2.5 mg total) by nebulization every 6 (six) hours as needed for Wheezing. 1 Box 12    azelastine (ASTELIN) 137 mcg (0.1 %) nasal spray 1 spray (137 mcg total) by Nasal route 2 (two) times daily. For severe " allergy 30 mL 1     No current facility-administered medications on file prior to visit.          Review of patient's allergies indicates:   Allergen Reactions    Iodine and iodide containing products Other (See Comments)             ROS:  General ROS: negative  Respiratory ROS: no cough, shortness of breath, or wheezing  Cardiovascular ROS: no chest pain or dyspnea on exertion  Musculoskeletal ROS: negative  Neurological ROS:   positive for - numbness/tingling  Dermatological ROS: negative      LAST HbA1c:   Hemoglobin A1C   Date Value Ref Range Status   05/11/2017 7.5 (H) 4.5 - 6.2 % Final     Comment:     According to ADA guidelines, hemoglobin A1C <7.0% represents  optimal control in non-pregnant diabetic patients.  Different  metrics may apply to specific populations.   Standards of Medical Care in Diabetes - 2016.  For the purpose of screening for the presence of diabetes:  <5.7%     Consistent with the absence of diabetes  5.7-6.4%  Consistent with increasing risk for diabetes   (prediabetes)  >or=6.5%  Consistent with diabetes  Currently no consensus exists for use of hemoglobin A1C  for diagnosis of diabetes for children.     05/11/2017 7.5 (H) 4.5 - 6.2 % Final     Comment:     According to ADA guidelines, hemoglobin A1C <7.0% represents  optimal control in non-pregnant diabetic patients.  Different  metrics may apply to specific populations.   Standards of Medical Care in Diabetes - 2016.  For the purpose of screening for the presence of diabetes:  <5.7%     Consistent with the absence of diabetes  5.7-6.4%  Consistent with increasing risk for diabetes   (prediabetes)  >or=6.5%  Consistent with diabetes  Currently no consensus exists for use of hemoglobin A1C  for diagnosis of diabetes for children.     02/03/2017 8.1 (H) 4.5 - 6.2 % Final     Comment:     According to ADA guidelines, hemoglobin A1C <7.0% represents  optimal control in non-pregnant diabetic patients.  Different  metrics may apply to  "specific populations.   Standards of Medical Care in Diabetes - 2016.  For the purpose of screening for the presence of diabetes:  <5.7%     Consistent with the absence of diabetes  5.7-6.4%  Consistent with increasing risk for diabetes   (prediabetes)  >or=6.5%  Consistent with diabetes  Currently no consensus exists for use of hemoglobin A1C  for diagnosis of diabetes for children.             EXAM:   Vitals:    05/25/17 1011   BP: (!) 165/70   Pulse: 84   Weight: 73.3 kg (161 lb 9.6 oz)   Height: 5' 3" (1.6 m)       General: alert, no distress, cooperative    Vascular:   Dorsalis pedis:   2+ bilateral.   Posterior Tibial:   2+ bilateral.   3 seconds capillary refill time   Temperature of toes are warm to touch.   normal hair growth on the feet.    Edema on feet:   Trace and non-pitting   Varicosities:  none    Dermatological:    Skin: thin,  Warm and dry. no hyperpigmentation, vitiligo, or suspicious lesions  Nails: toenails 1-5 L and 1-5 R  are short and polished pink  Callus:  None  Open Wounds: None  Ecchymoses is not observed.      Erythema:  none .     Interdigital spaces: clean, dry and without evidence of break in skin integrity      Neurological:    normal light touch sensation and normal position sensation  [place] diminished      Musculoskeletal:     Muscle strength: 5/5, adequate ROM, adequate strength     Right foot:  Splay 2nd and 3rd toes, 3-5 hammertoes  Left foot: splay 2nd and 3rd toes, 3-5 hammertoes             ASSESSMENT/PLAN:      I counseled the patient on her conditions, their implications and medical management.       Type II diabetes mellitus with neurological manifestations    Chemotherapy-induced neuropathy    Encounter for diabetic foot exam      Shoe inspection. Diabetic Foot Education. Patient reminded of the importance of good nutrition and blood sugar control to help prevent podiatric complications of diabetes. Patient instructed on proper foot hygiene. We discussed wearing proper " shoe gear, daily foot inspections, never walking without protective shoe gear, never putting sharp instruments to feet.        Return in about 1 year (around 5/25/2018) for diabetic foot exam, or sooner if concerned.

## 2017-05-25 NOTE — PATIENT INSTRUCTIONS
Your A1c:    Hemoglobin A1C   Date Value Ref Range Status   05/11/2017 7.5 (H) 4.5 - 6.2 % Final     Comment:     According to ADA guidelines, hemoglobin A1C <7.0% represents  optimal control in non-pregnant diabetic patients.  Different  metrics may apply to specific populations.   Standards of Medical Care in Diabetes - 2016.  For the purpose of screening for the presence of diabetes:  <5.7%     Consistent with the absence of diabetes  5.7-6.4%  Consistent with increasing risk for diabetes   (prediabetes)  >or=6.5%  Consistent with diabetes  Currently no consensus exists for use of hemoglobin A1C  for diagnosis of diabetes for children.     05/11/2017 7.5 (H) 4.5 - 6.2 % Final     Comment:     According to ADA guidelines, hemoglobin A1C <7.0% represents  optimal control in non-pregnant diabetic patients.  Different  metrics may apply to specific populations.   Standards of Medical Care in Diabetes - 2016.  For the purpose of screening for the presence of diabetes:  <5.7%     Consistent with the absence of diabetes  5.7-6.4%  Consistent with increasing risk for diabetes   (prediabetes)  >or=6.5%  Consistent with diabetes  Currently no consensus exists for use of hemoglobin A1C  for diagnosis of diabetes for children.     02/03/2017 8.1 (H) 4.5 - 6.2 % Final     Comment:     According to ADA guidelines, hemoglobin A1C <7.0% represents  optimal control in non-pregnant diabetic patients.  Different  metrics may apply to specific populations.   Standards of Medical Care in Diabetes - 2016.  For the purpose of screening for the presence of diabetes:  <5.7%     Consistent with the absence of diabetes  5.7-6.4%  Consistent with increasing risk for diabetes   (prediabetes)  >or=6.5%  Consistent with diabetes  Currently no consensus exists for use of hemoglobin A1C  for diagnosis of diabetes for children.         How to Check Your Feet    Below are tips to help you look for foot problems. Try to check your feet at the same  time each day, such as when you get out of bed in the morning.    · Check the top of each foot. The tops of toes, back of the heel, and outer edge of the foot can get a lot of rubbing from poor-fitting shoes.    · Check the bottom of each foot. Daily wear and tear often leads to problems at pressure spots.    · Check the toes and nails. Fungal infections often occur between toes. Toenail problems can also be a sign of fungal infections or lead to breaks in the skin.    · Check your shoes, too. Loose objects inside a shoe can injure the foot. Use your hand to feel inside your shoes for things like cecily, loose stitching, or rough areas that could irritate your skin.        Diabetic Foot Care    Diabetes can lead to a number of different foot complications. Fortunately, most of these complications can be prevented with a little extra foot care. If diabetes is not well controlled, the high blood sugar can cause damage to blood vessels and result in poor circulation to the foot. When the skin does not get enough blood flow, it becomes prone to pressure sores and ulcers, which heal slowly.  High blood sugar can also damage nerves, interfering with the ability to feel pain and pressure. When you cant feel your foot normally, it is easy to injure your skin, bones and joints without knowing it. For these reasons diabetes increases the risk of fungal infections, bunions and ulcers. Deep ulcers can lead to bone infection. Gangrene is the most serious foot complication of diabetes. It usually occurs on the tips of the toes as blacked areas of skin. The black area is dead tissue. In severe cases, gangrene spreads to involve the entire toe, other toes and the entire foot. Foot or toe amputation may be required. Good foot care and blood sugar control can prevent this.    Home Care  1. Wear comfortable, proper fitting shoes.  2. Wash your feet daily with warm water and mild soap.  3. After drying, apply a moisturizing cream or  lotion.  4. Check your feet daily for skin breaks, blisters, swelling, or redness. Look between your toes also.  5. Wear cotton socks and change them every day.  6. Trim toe nails carefully and do not cut your cuticles.  7. Strive to keep your blood sugar under control with a combination of medicines, diet and activity.  8. If you smoke and have diabetes, it is very important that you stop. Smoking reduces blood flow to your foot.  9. Avoid activities that increase your risk of foot injury:  · Do not walk barefoot.  · Do not use heating pads or hot water bottles on your feet.  · Do not put your foot in a hot tub without first checking the temperature with your hand.  10) Schedule yearly foot exams.    Follow Up  with your doctor or as advised by our staff. Report any cut, puncture, scrape, other injury, blister, ingrown toenail or ulcer on your foot.    Get Prompt Medical Attention  if any of the following occur:  -- Open ulcer with pus draining from the wound  -- Increasing foot or leg pain  -- New areas of redness or swelling or tender areas of the foot    © 5518-1876 [employer] [address] [place] [address], [place] [address]. All rights reserved. This information is not intended as a substitute for professional medical care. Always follow your healthcare professional's instructions.

## 2017-05-25 NOTE — PROGRESS NOTES
Subjective:    Patient ID:  Myesha Quinones is a 77 y.o. female who presents for follow-up of BiV ICD Check.     Myesha Quinones is a patient of Dr. Mckeon.    HPI     Ms. Quinones is a 77 y.o. female with NICM, HFpEF (EF 50-55%), LBBB s/p BiV ICD, CAD, HTN, hypercholesterolemia, KHOA, asthma, DM, CKD III, and a hx of breast cancer with associated chemotherapy-induced neuropathy. Ms. Quinones underwent successful CRT-D placement (09/12/07); her EF subsequently  normalized.   In December of 2012, Ms. Quinones experienced an episode of VT, ATP was unsuccessful, and an ICD shock resulted in a Type II break; prior to the shock, she was pre-syncopal. She defered the addition of an anti-arrhythmic at the time, as the event was her first shock, and her EF had returned to normal by that point.   Ms. Quinones underwent a successful generator change (03/20/13). At her last office visit, Ms. Quinones reported experiencing PELYAO, slightly worse than baseline, with an associated dry cough; she felt this was pulmonary in nature, and followed up with Pulmonology.    Since her last office visit, Ms. Quinones reports feeling well overall with occasional PELAYO at baseline; she denies chest pain, overt SOB,  dizziness, palpitations, or syncope. Ms. uQinones experiences occasional fatigue, per baseline, but remains active. She endorses compliance with CPAP.      Recent cardiac studies:  Echo (07/18/16) revealed an EF of 50-55%, severe LAE (MAHOGANY measuring 48.61 cc/m2), trivial-to-mild TR, LVDD, intermediate CVP, and a PASP of 42 mmHg.   NM Stress Test (11/08/16): The perfusion scan is free of evidence for myocardial ischemia or injury.  Device Interrogation (05/10/17) reveals an intrinsic SR with stable lead and device function. No arrhythmias or treated episodes noted. She RA paces 1% and BiV paces 99% of the time. Battery voltage 2.920 V (DONNA 2.63 V).     I reviewed today's ECG which demonstrated a BiV-paced rhythm at 80 bpm; ,  , and QTc 477.    Review of Systems   Constitution: Positive for malaise/fatigue. Negative for diaphoresis.   HENT: Negative for headaches and nosebleeds.    Eyes: Negative for double vision.   Cardiovascular: Positive for dyspnea on exertion. Negative for chest pain, irregular heartbeat, near-syncope, palpitations and syncope.   Respiratory: Negative for shortness of breath.    Skin: Negative.    Musculoskeletal: Negative.    Gastrointestinal: Negative for abdominal pain, hematemesis and hematochezia.   Genitourinary: Negative for hematuria.   Neurological: Negative for dizziness and light-headedness.   Psychiatric/Behavioral: Negative for altered mental status.        Objective:    Physical Exam   Constitutional: She is oriented to person, place, and time. She appears well-developed and well-nourished.   HENT:   Head: Normocephalic and atraumatic.   Eyes: Pupils are equal, round, and reactive to light.   Neck: Normal range of motion.   Cardiovascular: Normal rate, regular rhythm, normal heart sounds and intact distal pulses.    Pulmonary/Chest: Effort normal and breath sounds normal.   Abdominal: Soft.   Musculoskeletal: Normal range of motion.   Neurological: She is alert and oriented to person, place, and time.   Skin: She is not diaphoretic.   Vitals reviewed.        Assessment:       1. Nonischemic cardiomyopathy    2. Chronic systolic heart failure    3. LBBB (left bundle branch block)    4. Biventricular ICD (implantable cardioverter-defibrillator) in place    5. Coronary artery disease involving native coronary artery of native heart without angina pectoris    6. Essential hypertension    7. Pure hypercholesterolemia    8. KHOA (obstructive sleep apnea)    9. Asthma, chronic, mild persistent, uncomplicated    10. Type 2 diabetes mellitus with microalbuminuria, with long-term current use of insulin    11. Chronic kidney disease, stage 3, mod decreased GFR         Plan:       In summary, Ms. Quinones is  a 77 y.o. female with NICM, HFpEF (EF 50-55%), LBBB s/p BiV ICD, CAD, HTN, hypercholesterolemia, KHOA, asthma, DM, CKD III, and a hx of breast cancer with associated chemotherapy-induced neuropathy. Ms. Quinones is doing well from a rhythm perspective with stable lead and device function without arrhythmia noted.     Continue current medication regimen and device settings.   Follow up with Cardiology and PCP as directed.   Follow up in device clinic as scheduled.   Follow up in EP clinic in 1 year, sooner as needed.     Luisana Spain, [place], APRN, FNP-C

## 2017-05-25 NOTE — LETTER
May 25, 2017      Emelina Gaston MD  [facility] [address]           [place] [facility]  [address] [place] [address]  Phone: [phone]          Patient: Myesha Quinones   MR Number: [id number]   YOB: 1940   Date of Visit: 5/25/2017       Dear Dr. Emelina Gaston:    Thank you for referring Myesha Quinones to me for evaluation. Attached you will find relevant portions of my assessment and plan of care.    If you have questions, please do not hesitate to call me. I look forward to following Myesha Quinones along with you.    Sincerely,    Katey Gray DPM    Enclosure  CC:  No Recipients    If you would like to receive this communication electronically, please contact [email or web address] or [phone] to request more information on [facility] access.    For providers and/or their staff who would like to refer a patient to [facility], please contact us through our one-stop-shop provider referral line, [facility], at [phone].    If you feel you have received this communication in error or would no longer like to receive these types of communications, please e-mail [email or web address]

## 2017-05-31 ENCOUNTER — TELEPHONE (OUTPATIENT)
Dept: PHARMACY | Facility: CLINIC | Age: 77
End: 2017-05-31

## 2017-06-05 ENCOUNTER — HOSPITAL ENCOUNTER (OUTPATIENT)
Dept: RADIOLOGY | Facility: HOSPITAL | Age: 77
Discharge: HOME | End: 2017-06-05
Attending: INTERNAL MEDICINE
Payer: MEDICARE

## 2017-06-05 DIAGNOSIS — E04.1 THYROID NODULE: ICD-10-CM

## 2017-06-05 PROCEDURE — 76536 US EXAM OF HEAD AND NECK: CPT | Mod: 26,,, | Performed by: RADIOLOGY

## 2017-06-05 PROCEDURE — 76536 US EXAM OF HEAD AND NECK: CPT | Mod: TC

## 2017-06-07 ENCOUNTER — TELEPHONE (OUTPATIENT)
Dept: PHARMACY | Facility: CLINIC | Age: 77
End: 2017-06-07

## 2017-06-22 ENCOUNTER — OFFICE VISIT (OUTPATIENT)
Dept: CARDIOLOGY | Facility: CLINIC | Age: 77
End: 2017-06-22
Payer: MEDICARE

## 2017-06-22 VITALS
WEIGHT: 164.69 LBS | DIASTOLIC BLOOD PRESSURE: 70 MMHG | HEART RATE: 86 BPM | BODY MASS INDEX: 29.18 KG/M2 | HEIGHT: 63 IN | SYSTOLIC BLOOD PRESSURE: 158 MMHG

## 2017-06-22 DIAGNOSIS — I42.8 NONISCHEMIC CARDIOMYOPATHY: Primary | ICD-10-CM

## 2017-06-22 DIAGNOSIS — E78.00 PURE HYPERCHOLESTEROLEMIA: ICD-10-CM

## 2017-06-22 DIAGNOSIS — G47.33 OSA (OBSTRUCTIVE SLEEP APNEA): ICD-10-CM

## 2017-06-22 DIAGNOSIS — I10 ESSENTIAL HYPERTENSION: ICD-10-CM

## 2017-06-22 DIAGNOSIS — Z95.810 BIVENTRICULAR ICD (IMPLANTABLE CARDIOVERTER-DEFIBRILLATOR) IN PLACE: ICD-10-CM

## 2017-06-22 DIAGNOSIS — I50.22 CHRONIC SYSTOLIC HEART FAILURE: ICD-10-CM

## 2017-06-22 PROCEDURE — 1126F AMNT PAIN NOTED NONE PRSNT: CPT | Mod: S$GLB,,, | Performed by: INTERNAL MEDICINE

## 2017-06-22 PROCEDURE — 99999 PR PBB SHADOW E&M-EST. PATIENT-LVL III: CPT | Mod: PBBFAC,,, | Performed by: INTERNAL MEDICINE

## 2017-06-22 PROCEDURE — 1159F MED LIST DOCD IN RCRD: CPT | Mod: S$GLB,,, | Performed by: INTERNAL MEDICINE

## 2017-06-22 PROCEDURE — 99214 OFFICE O/P EST MOD 30 MIN: CPT | Mod: S$GLB,,, | Performed by: INTERNAL MEDICINE

## 2017-06-22 PROCEDURE — 99499 UNLISTED E&M SERVICE: CPT | Mod: S$GLB,,, | Performed by: INTERNAL MEDICINE

## 2017-06-22 RX ORDER — DOXAZOSIN 1 MG/1
1 TABLET ORAL NIGHTLY
Qty: 30 TABLET | Refills: 11 | Status: SHIPPED | OUTPATIENT
Start: 2017-06-22 | End: 2017-08-30 | Stop reason: SINTOL

## 2017-06-22 NOTE — PROGRESS NOTES
Subjective:   Patient ID:  Myesha Quinones is a 77 y.o. female who presents for follow-up of Hypertension and Cardiomyopathy      HPI:   Myesha Quinones presents for follow up of non-ischemic cardiomyopathy and hypertension. She has had a NICM with EF harvinder in the 50s s/p BiV ICD . Myesha Quinones has hypertension that has not been well controlled despite multiple medications. BP at home mostly 130-160/. She apparently had edema in the past and hyperkalemia. Myesha Quinones is not exercising.Myesha Quinones denies chest pain, shortness of breath, palpitations, presyncope , or syncope. Myesha Quinones has dyslipidemia  on moderate intensity statin therapy with LDL 40s...  .  Review of Systems   Constitution: Negative for weakness, malaise/fatigue, weight gain and weight loss.   HENT: Negative for headaches.    Eyes: Negative for blurred vision.   Cardiovascular: Negative for chest pain, claudication, cyanosis, dyspnea on exertion, irregular heartbeat, leg swelling, near-syncope, orthopnea, palpitations, paroxysmal nocturnal dyspnea and syncope.   Respiratory: Negative for cough, shortness of breath and wheezing.         KHOA wears CPAP   Musculoskeletal: Negative for falls and myalgias.   Gastrointestinal: Negative for abdominal pain, heartburn, nausea and vomiting.   Genitourinary: Negative for nocturia.   Neurological: Negative for brief paralysis, dizziness, focal weakness, numbness and paresthesias.   Psychiatric/Behavioral: Negative for altered mental status.       Current Outpatient Prescriptions   Medication Sig    albuterol (PROVENTIL) 2.5 mg /3 mL (0.083 %) nebulizer solution Take 3 mLs (2.5 mg total) by nebulization every 6 (six) hours as needed for Wheezing.    aspirin (ENTERIC COATED ASPIRIN) 81 MG EC tablet Take 1 tablet by mouth.    azelastine (ASTELIN) 137 mcg (0.1 %) nasal spray 1 spray (137 mcg total) by Nasal route 2 (two) times daily. For severe allergy    blood sugar  "diagnostic (ACCU-CHEK HECTOR) Strp Uses Accu-Check Hector meter to test BG 4x/day (Patient taking differently: Uses Accu-Check Hector meter to test BG 4x/day checking 2x day)    blood sugar diagnostic (ONETOUCH ULTRA TEST) Strp To use as directed with One Touch Ultra Meter and monitor blood sugars TID    blood-glucose meter kit Dispense Accu-Chek Hector meter. Use as instructed    carvedilol (COREG) 25 MG tablet TAKE 2 TABLETS BY MOUTH TWICE A DAY    chlorthalidone (HYGROTEN) 25 MG Tab Take 1 tablet (25 mg total) by mouth once daily.    fluticasone-salmeterol 250-50 mcg/dose (ADVAIR) 250-50 mcg/dose diskus inhaler Inhale 1 puff into the lungs 2 (two) times daily. This is a change from one month    glimepiride (AMARYL) 2 MG tablet TAKE 1 TABLET BY MOUTH TWICE A DAY    hydrALAZINE (APRESOLINE) 100 MG tablet Take 1 tablet (100 mg total) by mouth 3 (three) times daily.    insulin glargine (LANTUS SOLOSTAR) 100 unit/mL (3 mL) InPn pen INJECT 40 UNITS INTO THE SKIN EVERY EVENING.    lancets (ACCU-CHEK SOFTCLIX LANCETS) Misc Uses Accu-Chek Hector meter to test BG 4x/day    linagliptin (TRADJENTA) 5 mg Tab tablet Take 1 tablet (5 mg total) by mouth once daily.    metformin (GLUCOPHAGE) 500 MG tablet Take 2 tablets (1,000 mg total) by mouth 2 (two) times daily with meals.    nitroGLYCERIN (NITROSTAT) 0.4 MG SL tablet Place 1 tablet under the tongue continuous prn.      omega-3 fatty acids (FISH OIL) 500 mg Cap Take 1 capsule by mouth Twice daily.    pen needle, diabetic (BD INSULIN PEN NEEDLE UF MINI) 31 gauge x 3/16" Ndle USE WITH LANTUS AT BEDTIME    simvastatin (ZOCOR) 20 MG tablet Take 1 tablet (20 mg total) by mouth every evening.    valsartan (DIOVAN) 320 MG tablet Take 1 tablet (320 mg total) by mouth once daily.    doxazosin (CARDURA) 1 MG tablet Take 1 tablet (1 mg total) by mouth every evening.     No current facility-administered medications for this visit.      Objective:   Physical Exam " "  Constitutional: She is oriented to person, place, and time. She appears well-developed. No distress.   BP (!) 158/70 (BP Location: Left arm, Patient Position: Sitting, BP Method: Automatic)   Pulse 86   Ht 5' 3" (1.6 m)   Wt 74.7 kg (164 lb 10.9 oz)   BMI 29.17 kg/m²    HENT:   Head: Normocephalic.   Eyes: Conjunctivae are normal. Pupils are equal, round, and reactive to light. No scleral icterus.   Neck: Neck supple. No JVD present. No thyromegaly present.   Cardiovascular: Normal rate, regular rhythm, normal heart sounds and intact distal pulses.  PMI is not displaced.  Exam reveals no gallop and no friction rub.    No murmur heard.  Pulmonary/Chest: Effort normal and breath sounds normal. No respiratory distress. She has no wheezes. She has no rales.   ICD present left chest   Abdominal: Soft. She exhibits no distension. There is no splenomegaly or hepatomegaly. There is no tenderness.   Musculoskeletal: She exhibits no edema or tenderness.   Gait normal   Neurological: She is alert and oriented to person, place, and time.   Skin: Skin is warm and dry. She is not diaphoretic.   Psychiatric: She has a normal mood and affect. Her behavior is normal.       Lab Results   Component Value Date     05/11/2017     05/11/2017    K 4.2 05/11/2017    K 4.2 05/11/2017     05/11/2017     05/11/2017    CO2 23 05/11/2017    CO2 23 05/11/2017    BUN 30 (H) 05/11/2017    BUN 30 (H) 05/11/2017    CREATININE 1.2 05/11/2017    CREATININE 1.2 05/11/2017    GLU 89 05/11/2017    GLU 89 05/11/2017    HGBA1C 7.5 (H) 05/11/2017    HGBA1C 7.5 (H) 05/11/2017    MG 1.5 (L) 12/20/2012    AST 19 05/11/2017    ALT 23 05/11/2017    ALBUMIN 3.5 05/11/2017    PROT 6.8 05/11/2017    BILITOT 0.3 05/11/2017    WBC 9.36 05/11/2017    HGB 11.2 (L) 05/11/2017    HCT 34.4 (L) 05/11/2017    MCV 90 05/11/2017     05/11/2017    TSH 0.739 01/19/2017    CHOL 102 (L) 05/11/2017    HDL 42 05/11/2017    LDLCALC 39.8 (L) " 05/11/2017    TRIG 101 05/11/2017       Assessment:     1. Nonischemic cardiomyopathy : EF 50% with diastolic dysfunction   2. Chronic systolic heart failure: Compensated    3. Essential hypertension : Not ideal control   4. Pure hypercholesterolemia :  on moderate intensity statin therapy At LDL goal   5. KHOA (obstructive sleep apnea) : Wears CPAP   6. Biventricular ICD (implantable cardioverter-defibrillator) in place        Plan:     Diagnoses and all orders for this visit:    Nonischemic cardiomyopathy  Continue current regimen    Chronic systolic heart failure    Essential hypertension  -     Cardiology Lab US Renal Artery Scan Bilateral; Future  -     doxazosin (CARDURA) 1 MG tablet; Take 1 tablet (1 mg total) by mouth every evening ( added ).  -     NURSING COMMUNICATION: Create [facility] Account  -     Hypertension Digital Medicine (HDMP) Enrollment Order  -     Hypertension Digital Medicine (HDMP): Assign Onboarding Questionnaires    Pure hypercholesterolemia  Continue current regimen    KHOA (obstructive sleep apnea)  Continue CPAP    Biventricular ICD (implantable cardioverter-defibrillator) in place    Per EP

## 2017-06-27 ENCOUNTER — TELEPHONE (OUTPATIENT)
Dept: PULMONOLOGY | Facility: CLINIC | Age: 77
End: 2017-06-27

## 2017-06-29 ENCOUNTER — CLINICAL SUPPORT (OUTPATIENT)
Dept: CARDIOLOGY | Facility: CLINIC | Age: 77
End: 2017-06-29
Payer: MEDICARE

## 2017-06-29 DIAGNOSIS — I10 ESSENTIAL HYPERTENSION: ICD-10-CM

## 2017-06-29 PROCEDURE — 93975 VASCULAR STUDY: CPT | Mod: S$GLB,,, | Performed by: INTERNAL MEDICINE

## 2017-06-30 ENCOUNTER — TELEPHONE (OUTPATIENT)
Dept: CARDIOLOGY | Facility: CLINIC | Age: 77
End: 2017-06-30

## 2017-06-30 NOTE — TELEPHONE ENCOUNTER
----- Message from Jose Luis Fernando MD sent at 6/29/2017  5:28 PM CDT -----  Please inform the patient that the study on her kidney arteries did not reveal significant narrowing to explain her difficult to control hypertension.

## 2017-08-01 RX ORDER — PEN NEEDLE, DIABETIC 31 GX5/16"
NEEDLE, DISPOSABLE MISCELLANEOUS
Refills: 3 | Status: CANCELLED | OUTPATIENT
Start: 2017-08-01

## 2017-08-03 RX ORDER — PEN NEEDLE, DIABETIC 31 GX5/16"
NEEDLE, DISPOSABLE MISCELLANEOUS
Qty: 100 EACH | Refills: 3 | Status: SHIPPED | OUTPATIENT
Start: 2017-08-03 | End: 2018-02-15 | Stop reason: SDUPTHER

## 2017-08-03 RX ORDER — PEN NEEDLE, DIABETIC 30 GX3/16"
NEEDLE, DISPOSABLE MISCELLANEOUS
Qty: 100 EACH | Refills: 3 | Status: SHIPPED | OUTPATIENT
Start: 2017-08-03 | End: 2018-02-15 | Stop reason: SDUPTHER

## 2017-08-03 NOTE — TELEPHONE ENCOUNTER
"----- Message from Lynn Cee sent at 8/3/2017 10:32 AM CDT -----  Contact: Pt  Pt is calling for medication refill on pen needle, diabetic (BD INSULIN PEN NEEDLE UF MINI) 31 gauge x 3/16" Ndle.    Rx can be sent to [facility] at [phone].  Pt can be reached at [phone].    Thank you  "

## 2017-08-07 ENCOUNTER — LAB VISIT (OUTPATIENT)
Dept: LAB | Facility: HOSPITAL | Age: 77
End: 2017-08-07
Attending: INTERNAL MEDICINE
Payer: MEDICARE

## 2017-08-07 DIAGNOSIS — E04.1 THYROID NODULE: ICD-10-CM

## 2017-08-07 DIAGNOSIS — I10 ESSENTIAL HYPERTENSION: ICD-10-CM

## 2017-08-07 DIAGNOSIS — E11.29 TYPE 2 DIABETES MELLITUS WITH MICROALBUMINURIA, WITH LONG-TERM CURRENT USE OF INSULIN: ICD-10-CM

## 2017-08-07 DIAGNOSIS — Z79.4 TYPE 2 DIABETES MELLITUS WITH MICROALBUMINURIA, WITH LONG-TERM CURRENT USE OF INSULIN: ICD-10-CM

## 2017-08-07 DIAGNOSIS — E53.8 B12 NUTRITIONAL DEFICIENCY: ICD-10-CM

## 2017-08-07 DIAGNOSIS — D50.9 IRON DEFICIENCY ANEMIA, UNSPECIFIED IRON DEFICIENCY ANEMIA TYPE: ICD-10-CM

## 2017-08-07 DIAGNOSIS — R80.9 TYPE 2 DIABETES MELLITUS WITH MICROALBUMINURIA, WITH LONG-TERM CURRENT USE OF INSULIN: ICD-10-CM

## 2017-08-07 LAB
ALBUMIN SERPL BCP-MCNC: 3.6 G/DL
ALP SERPL-CCNC: 57 U/L
ALT SERPL W/O P-5'-P-CCNC: 23 U/L
ANION GAP SERPL CALC-SCNC: 8 MMOL/L
AST SERPL-CCNC: 20 U/L
BASOPHILS # BLD AUTO: 0.04 K/UL
BASOPHILS NFR BLD: 0.5 %
BILIRUB SERPL-MCNC: 0.3 MG/DL
BUN SERPL-MCNC: 22 MG/DL
CALCIUM SERPL-MCNC: 9.8 MG/DL
CHLORIDE SERPL-SCNC: 104 MMOL/L
CO2 SERPL-SCNC: 25 MMOL/L
CREAT SERPL-MCNC: 1.2 MG/DL
DIFFERENTIAL METHOD: ABNORMAL
EOSINOPHIL # BLD AUTO: 1.4 K/UL
EOSINOPHIL NFR BLD: 16 %
ERYTHROCYTE [DISTWIDTH] IN BLOOD BY AUTOMATED COUNT: 13.6 %
EST. GFR  (AFRICAN AMERICAN): 50 ML/MIN/1.73 M^2
EST. GFR  (NON AFRICAN AMERICAN): 43.4 ML/MIN/1.73 M^2
FERRITIN SERPL-MCNC: 24 NG/ML
GLUCOSE SERPL-MCNC: 148 MG/DL
HCT VFR BLD AUTO: 35.3 %
HGB BLD-MCNC: 11.3 G/DL
IRON SERPL-MCNC: 60 UG/DL
LYMPHOCYTES # BLD AUTO: 1.7 K/UL
LYMPHOCYTES NFR BLD: 19.8 %
MCH RBC QN AUTO: 29.1 PG
MCHC RBC AUTO-ENTMCNC: 32 G/DL
MCV RBC AUTO: 91 FL
MONOCYTES # BLD AUTO: 0.6 K/UL
MONOCYTES NFR BLD: 6.8 %
NEUTROPHILS # BLD AUTO: 4.8 K/UL
NEUTROPHILS NFR BLD: 56.9 %
PLATELET # BLD AUTO: 217 K/UL
PMV BLD AUTO: 12 FL
POTASSIUM SERPL-SCNC: 4.5 MMOL/L
PROT SERPL-MCNC: 6.9 G/DL
RBC # BLD AUTO: 3.88 M/UL
SATURATED IRON: 13 %
SODIUM SERPL-SCNC: 137 MMOL/L
TOTAL IRON BINDING CAPACITY: 448 UG/DL
TRANSFERRIN SERPL-MCNC: 303 MG/DL
TSH SERPL DL<=0.005 MIU/L-ACNC: 0.56 UIU/ML
VIT B12 SERPL-MCNC: 1124 PG/ML
WBC # BLD AUTO: 8.52 K/UL

## 2017-08-07 PROCEDURE — 83036 HEMOGLOBIN GLYCOSYLATED A1C: CPT

## 2017-08-07 PROCEDURE — 83540 ASSAY OF IRON: CPT

## 2017-08-07 PROCEDURE — 82728 ASSAY OF FERRITIN: CPT

## 2017-08-07 PROCEDURE — 85025 COMPLETE CBC W/AUTO DIFF WBC: CPT

## 2017-08-07 PROCEDURE — 80053 COMPREHEN METABOLIC PANEL: CPT

## 2017-08-07 PROCEDURE — 84443 ASSAY THYROID STIM HORMONE: CPT

## 2017-08-07 PROCEDURE — 82607 VITAMIN B-12: CPT

## 2017-08-07 PROCEDURE — 36415 COLL VENOUS BLD VENIPUNCTURE: CPT | Mod: PO

## 2017-08-08 LAB
ESTIMATED AVG GLUCOSE: 174 MG/DL
HBA1C MFR BLD HPLC: 7.7 %

## 2017-08-14 ENCOUNTER — CLINICAL SUPPORT (OUTPATIENT)
Dept: ELECTROPHYSIOLOGY | Facility: CLINIC | Age: 77
End: 2017-08-14
Payer: MEDICARE

## 2017-08-14 DIAGNOSIS — Z95.810 ICD (IMPLANTABLE CARDIOVERTER-DEFIBRILLATOR) IN PLACE: ICD-10-CM

## 2017-08-14 DIAGNOSIS — I42.9 CARDIOMYOPATHY, PRIMARY: ICD-10-CM

## 2017-08-14 PROCEDURE — 93297 REM INTERROG DEV EVAL ICPMS: CPT | Mod: S$GLB,,, | Performed by: INTERNAL MEDICINE

## 2017-08-14 PROCEDURE — 93296 REM INTERROG EVL PM/IDS: CPT | Mod: S$GLB,,, | Performed by: INTERNAL MEDICINE

## 2017-08-14 PROCEDURE — 93295 DEV INTERROG REMOTE 1/2/MLT: CPT | Mod: S$GLB,,, | Performed by: INTERNAL MEDICINE

## 2017-08-16 ENCOUNTER — OFFICE VISIT (OUTPATIENT)
Dept: INTERNAL MEDICINE | Facility: CLINIC | Age: 77
End: 2017-08-16
Payer: MEDICARE

## 2017-08-16 VITALS
OXYGEN SATURATION: 95 % | DIASTOLIC BLOOD PRESSURE: 58 MMHG | SYSTOLIC BLOOD PRESSURE: 118 MMHG | WEIGHT: 160.94 LBS | BODY MASS INDEX: 28.52 KG/M2 | HEIGHT: 63 IN | HEART RATE: 80 BPM

## 2017-08-16 DIAGNOSIS — Z95.810 BIVENTRICULAR ICD (IMPLANTABLE CARDIOVERTER-DEFIBRILLATOR) IN PLACE: ICD-10-CM

## 2017-08-16 DIAGNOSIS — I25.10 CORONARY ARTERY DISEASE INVOLVING NATIVE CORONARY ARTERY OF NATIVE HEART WITHOUT ANGINA PECTORIS: ICD-10-CM

## 2017-08-16 DIAGNOSIS — T45.1X5A CHEMOTHERAPY-INDUCED NEUROPATHY: ICD-10-CM

## 2017-08-16 DIAGNOSIS — J45.30 ASTHMA, CHRONIC, MILD PERSISTENT, UNCOMPLICATED: ICD-10-CM

## 2017-08-16 DIAGNOSIS — I10 ESSENTIAL HYPERTENSION: ICD-10-CM

## 2017-08-16 DIAGNOSIS — D50.9 IRON DEFICIENCY ANEMIA, UNSPECIFIED IRON DEFICIENCY ANEMIA TYPE: ICD-10-CM

## 2017-08-16 DIAGNOSIS — I50.22 CHRONIC SYSTOLIC HEART FAILURE: ICD-10-CM

## 2017-08-16 DIAGNOSIS — Z79.4 TYPE 2 DIABETES MELLITUS WITH MICROALBUMINURIA, WITH LONG-TERM CURRENT USE OF INSULIN: ICD-10-CM

## 2017-08-16 DIAGNOSIS — R80.9 TYPE 2 DIABETES MELLITUS WITH MICROALBUMINURIA, WITH LONG-TERM CURRENT USE OF INSULIN: ICD-10-CM

## 2017-08-16 DIAGNOSIS — Z00.00 PHYSICAL EXAM: Primary | ICD-10-CM

## 2017-08-16 DIAGNOSIS — E11.29 TYPE 2 DIABETES MELLITUS WITH MICROALBUMINURIA, WITH LONG-TERM CURRENT USE OF INSULIN: ICD-10-CM

## 2017-08-16 DIAGNOSIS — E04.2 NONTOXIC MULTINODULAR GOITER: ICD-10-CM

## 2017-08-16 DIAGNOSIS — G62.0 CHEMOTHERAPY-INDUCED NEUROPATHY: ICD-10-CM

## 2017-08-16 DIAGNOSIS — E78.00 PURE HYPERCHOLESTEROLEMIA: ICD-10-CM

## 2017-08-16 PROCEDURE — 99999 PR PBB SHADOW E&M-EST. PATIENT-LVL III: CPT | Mod: PBBFAC,,, | Performed by: INTERNAL MEDICINE

## 2017-08-16 PROCEDURE — 99499 UNLISTED E&M SERVICE: CPT | Mod: S$GLB,,, | Performed by: INTERNAL MEDICINE

## 2017-08-16 PROCEDURE — 99397 PER PM REEVAL EST PAT 65+ YR: CPT | Mod: S$GLB,,, | Performed by: INTERNAL MEDICINE

## 2017-08-16 NOTE — PROGRESS NOTES
Subjective:      Patient ID: Myesha Quinones is a 77 y.o. female.    Chief Complaint: Annual Exam    HPI:  HPI     Patient's blood pressure has been elevated and Dr. Fernando had added Cardura    Patient is in the Diabetes program.     Annual exam: 8/16/2017  Colonoscopy 7/2011 with colon polyp, diverticulosis and follow up in 3 years done 8/27/2014 ( follow up in 5 years)  Mammogram: 3/23/2017 Dr. Jaime  Gyn: hysterectomy and ovaries  Optho: due 10/2017  Flu due in the fall  Tetanus: 4/2/2009  Shingles: 4/2/2009  Pneumovax 6/5/2013  Prevnar 13: done    Pacemaker: :checked every 3 months  Thyroid     Reviewed diabetic screening up to date     Consultants:   Dr. Nguyen : skin cancer 2017 every 6 months  Dr Soler : as needed  Neurosurgery: has meningioma: no follow up recommmended  Urology: left renal tumor ( [place]) no follow up  Endocrine:NP  Cardiology: Dr. Fernando  Sleep Apnea: recently rechecked, patient uses equipment                Patient Active Problem List   Diagnosis    History of nonmelanoma skin cancer    Nonischemic cardiomyopathy    LBBB (left bundle branch block)    Chronic systolic heart failure    Nontoxic multinodular goiter    Meningioma    Asthma, chronic    Biventricular ICD (implantable cardioverter-defibrillator) in place    Coronary artery disease involving native coronary artery without angina pectoris - Non-obstructive 50% LAD    Essential hypertension    Diastolic dysfunction    Hyperlipidemia    History of breast cancer    Adrenal cortical nodule: repeat CT 6/2016    Calcification of aorta    Diabetic polyneuropathy associated with type 2 diabetes mellitus    Osteoporosis    KHOA (obstructive sleep apnea)    Type 2 diabetes mellitus with microalbuminuria, with long-term current use of insulin    Chronic kidney disease, stage 3, mod decreased GFR    Iron deficiency anemia    Thyroid nodule    Chemotherapy-induced neuropathy     Past Medical History:   Diagnosis Date     Allergy     Asthma     Basal cell carcinoma     left forehead    Basal cell carcinoma     left nose    Basal cell carcinoma 05/20/2015    right nose    Basal cell carcinoma 12/22/2015    left lower post neck    CAD (coronary artery disease)     Cardiomyopathy     Cardiomyopathy, ischemic     Cataract     CHF (congestive heart failure)     Chronic kidney disease, stage 3, mod decreased GFR 2/14/2017    COPD (chronic obstructive pulmonary disease)     Defibrillator discharge     Diabetes mellitus     Diabetes mellitus type II     Diabetes with neurologic complications     Goiter     MNG    HX: breast cancer     Hyperlipidemia     Hypertension     Iron deficiency anemia 5/16/2017    Left kidney mass     Meningioma     Osteoporosis, postmenopausal     Pacemaker     Skin cancer     s/p excision    Sleep apnea     CPAP    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/2015    mid forehead    Unspecified vitamin D deficiency     Ventricular tachycardia     Vitamin B12 deficiency     Vitamin D deficiency disease      Past Surgical History:   Procedure Laterality Date    BASAL CELL CARCINOMA EXCISION      posterior neck and nose    BREAST SURGERY      CARDIAC DEFIBRILLATOR PLACEMENT      x 2    CATARACT EXTRACTION W/  INTRAOCULAR LENS IMPLANT Bilateral     CHOLECYSTECTOMY      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    FRACTURE SURGERY      left elbow and wrist as a child    HYSTERECTOMY      MASTECTOMY      right    SQUAMOUS CELL CARCINOMA EXCISION      remved from forehead    TONSILLECTOMY       Family History   Problem Relation Age of Onset    Diabetes Father     Heart disease Father     Diabetes Sister     Heart disease Sister     Diabetes Brother     Heart disease Brother     Hypertension Brother     Diabetes Brother     Heart disease Brother     Hypertension Brother     Diabetes Brother     Heart disease Brother     Cancer Brother      colon    Diabetes Brother      "Cancer Son      skin    Diabetes Son      prediabetes    Diabetes Daughter      prediabetes    Cancer Daughter      melanoma    Obesity Daughter     Diabetes Son     Asthma Mother     Hypertension Mother     Stroke Mother     No Known Problems Maternal Grandmother     No Known Problems Maternal Grandfather     No Known Problems Paternal Grandmother     No Known Problems Paternal Grandfather     Amblyopia Neg Hx     Blindness Neg Hx     Cataracts Neg Hx     Glaucoma Neg Hx     Macular degeneration Neg Hx     Retinal detachment Neg Hx     Strabismus Neg Hx     Thyroid disease Neg Hx      Review of Systems   Constitutional: Negative for chills, fever and unexpected weight change.   HENT: Negative for trouble swallowing.    Respiratory: Negative for cough, shortness of breath and wheezing.    Cardiovascular: Negative for chest pain and palpitations.   Gastrointestinal: Negative for abdominal distention, abdominal pain, blood in stool and vomiting.   Musculoskeletal: Negative for back pain.     Objective:     Vitals:    08/16/17 0703   BP: (!) 118/58   Pulse: 80   SpO2: 95%   Weight: 73 kg (160 lb 15 oz)   Height: 5' 3" (1.6 m)   PainSc: 0-No pain     Body mass index is 28.51 kg/m².  Physical Exam   Constitutional: She is oriented to person, place, and time. She appears well-developed and well-nourished. No distress.   Neck: Carotid bruit is not present. No thyromegaly present.   Cardiovascular: Normal rate, regular rhythm and normal heart sounds.  PMI is not displaced.    Pulmonary/Chest: Effort normal and breath sounds normal. No respiratory distress.   Abdominal: Soft. Bowel sounds are normal. She exhibits no distension. There is no tenderness.   Musculoskeletal: She exhibits no edema.   Neurological: She is alert and oriented to person, place, and time.     Assessment:     1. Physical exam    2. Asthma, chronic, mild persistent, uncomplicated    3. Biventricular ICD (implantable " cardioverter-defibrillator) in place    4. Chemotherapy-induced neuropathy    5. Chronic systolic heart failure    6. Coronary artery disease involving native coronary artery of native heart without angina pectoris    7. Essential hypertension    8. Pure hypercholesterolemia    9. Iron deficiency anemia, unspecified iron deficiency anemia type    10. Nontoxic multinodular goiter    11. Type 2 diabetes mellitus with microalbuminuria, with long-term current use of insulin      Plan:   Myesha was seen today for annual exam.    Diagnoses and all orders for this visit:    Physical exam: updated and reviewed    Asthma, chronic, mild persistent, uncomplicated: stable at this time  The current medical regimen is effective;  continue present plan and medications.      Biventricular ICD (implantable cardioverter-defibrillator) in place: patient following in pacemaker clinic    Chemotherapy-induced neuropathy: stable    Chronic systolic heart failure: stable    Coronary artery disease involving native coronary artery of native heart without angina pectoris: stable    Essential hypertension  The current medical regimen is effective;  continue present plan and medications.      Pure hypercholesterolemia  The current medical regimen is effective;  continue present plan and medications.      Iron deficiency anemia, unspecified iron deficiency anemia type  The current medical regimen is effective;  continue present plan and medications.      Nontoxic multinodular goiter  The current medical regimen is effective;  continue present plan and medications.      Type 2 diabetes mellitus with microalbuminuria, with long-term current use of insulin  The current medical regimen is effective;  continue present plan and medications.      Return in about 3 months (around 11/16/2017) for FU.     Please check blood pressure     AM:  1 hour after taking meds    PM  1 hour after taking meds    Will consider diltiazem     Medication List           Accurate as of 8/16/17  8:10 AM. If you have any questions, ask your nurse or doctor.               CHANGE how you take these medications    * blood sugar diagnostic Strp  Commonly known as:  ONETOUCH ULTRA TEST  To use as directed with One Touch Ultra Meter and monitor blood sugars TID  What changed:  Another medication with the same name was changed. Make sure you understand how and when to take each.  Changed by:  Nelli Valladares MD     * blood sugar diagnostic Strp  Commonly known as:  ACCU-CHEK HECTOR  Uses Accu-Check Hector meter to test BG 4x/day  What changed:  additional instructions  Changed by:  Beny Kemp DNP, NP     doxazosin 1 MG tablet  Commonly known as:  CARDURA  Take 1 tablet (1 mg total) by mouth every evening.  What changed:  additional instructions        * This list has 2 medication(s) that are the same as other medications prescribed for you. Read the directions carefully, and ask your doctor or other care provider to review them with you.            CONTINUE taking these medications    albuterol 2.5 mg /3 mL (0.083 %) nebulizer solution  Commonly known as:  PROVENTIL  Take 3 mLs (2.5 mg total) by nebulization every 6 (six) hours as needed for Wheezing.     azelastine 137 mcg (0.1 %) nasal spray  Commonly known as:  ASTELIN  1 spray (137 mcg total) by Nasal route 2 (two) times daily. For severe allergy     blood-glucose meter kit  Dispense Accu-Chek Hector meter. Use as instructed     carvedilol 25 MG tablet  Commonly known as:  COREG  TAKE 2 TABLETS BY MOUTH TWICE A DAY     chlorthalidone 25 MG Tab  Commonly known as:  HYGROTEN  Take 1 tablet (25 mg total) by mouth once daily.     ENTERIC COATED ASPIRIN 81 MG EC tablet  Generic drug:  aspirin     FISH  mg Cap  Generic drug:  omega-3 fatty acids     fluticasone-salmeterol 250-50 mcg/dose 250-50 mcg/dose diskus inhaler  Commonly known as:  ADVAIR  Inhale 1 puff into the lungs 2 (two) times daily. This is a change from one  "month     glimepiride 2 MG tablet  Commonly known as:  AMARYL  TAKE 1 TABLET BY MOUTH TWICE A DAY     hydrALAZINE 100 MG tablet  Commonly known as:  APRESOLINE  Take 1 tablet (100 mg total) by mouth 3 (three) times daily.     insulin glargine 100 unit/mL (3 mL) Inpn pen  Commonly known as:  LANTUS SOLOSTAR  INJECT 40 UNITS INTO THE SKIN EVERY EVENING.     lancets Misc  Commonly known as:  ACCU-CHEK SOFTCLIX LANCETS  Uses Accu-Chek Angelica meter to test BG 4x/day     linagliptin 5 mg Tab tablet  Commonly known as:  TRADJENTA  Take 1 tablet (5 mg total) by mouth once daily.     metformin 500 MG tablet  Commonly known as:  GLUCOPHAGE  Take 2 tablets (1,000 mg total) by mouth 2 (two) times daily with meals.     nitroGLYCERIN 0.4 MG SL tablet  Commonly known as:  NITROSTAT     * pen needle, diabetic 31 gauge x 3/16" Ndle  Commonly known as:  BD INSULIN PEN NEEDLE UF MINI  USE WITH LANTUS AT BEDTIME     * BD INSULIN PEN NEEDLE UF MINI 31 gauge x 3/16" Ndle  Generic drug:  pen needle, diabetic  USE WITH LANTUS AT BEDTIME     simvastatin 20 MG tablet  Commonly known as:  ZOCOR  Take 1 tablet (20 mg total) by mouth every evening.     valsartan 320 MG tablet  Commonly known as:  DIOVAN  Take 1 tablet (320 mg total) by mouth once daily.        * This list has 2 medication(s) that are the same as other medications prescribed for you. Read the directions carefully, and ask your doctor or other care provider to review them with you.              "

## 2017-08-17 ENCOUNTER — TELEPHONE (OUTPATIENT)
Dept: INTERNAL MEDICINE | Facility: CLINIC | Age: 77
End: 2017-08-17

## 2017-08-17 NOTE — TELEPHONE ENCOUNTER
----- Message from Jose Luis Fernando MD sent at 8/17/2017  9:44 AM CDT -----  Contact: Emelinagemini Dony  She apparently has had edema in the past and has a NICM although with resynchronization therapy her EF is now 50%.  I suggested she enter the Digital Monitoring program but don't see that she as yet  enrolled. BP in your office looked very good.  ----- Message -----  From: Emelina Gaston MD  Sent: 8/16/2017   8:14 AM  To: MD Jose Luis Bruce,      Ms Quinones could not tolerate the Cardura. She brings in a set of BP taken under different circumstances during the day. I have asked her to take her BP  Only two times a day and that should be one hour after am meds and one hour after pm meds. I have also noted that she needs a large cuff. In the office today her BP was very good with the large cuff.        Would you think diltiazem would be a good next med    Best,  Emelina

## 2017-08-30 ENCOUNTER — PATIENT MESSAGE (OUTPATIENT)
Dept: ADMINISTRATIVE | Facility: OTHER | Age: 77
End: 2017-08-30

## 2017-08-30 ENCOUNTER — LAB VISIT (OUTPATIENT)
Dept: LAB | Facility: HOSPITAL | Age: 77
End: 2017-08-30
Attending: INTERNAL MEDICINE
Payer: MEDICARE

## 2017-08-30 ENCOUNTER — OFFICE VISIT (OUTPATIENT)
Dept: CARDIOLOGY | Facility: CLINIC | Age: 77
End: 2017-08-30
Payer: MEDICARE

## 2017-08-30 VITALS
BODY MASS INDEX: 28.9 KG/M2 | HEIGHT: 63 IN | SYSTOLIC BLOOD PRESSURE: 152 MMHG | HEART RATE: 87 BPM | DIASTOLIC BLOOD PRESSURE: 72 MMHG | WEIGHT: 163.13 LBS

## 2017-08-30 DIAGNOSIS — I42.8 NONISCHEMIC CARDIOMYOPATHY: Primary | ICD-10-CM

## 2017-08-30 DIAGNOSIS — Z95.810 BIVENTRICULAR ICD (IMPLANTABLE CARDIOVERTER-DEFIBRILLATOR) IN PLACE: ICD-10-CM

## 2017-08-30 DIAGNOSIS — I10 ESSENTIAL HYPERTENSION: ICD-10-CM

## 2017-08-30 DIAGNOSIS — E78.00 PURE HYPERCHOLESTEROLEMIA: ICD-10-CM

## 2017-08-30 DIAGNOSIS — I50.22 CHRONIC SYSTOLIC HEART FAILURE: ICD-10-CM

## 2017-08-30 DIAGNOSIS — G47.33 OSA (OBSTRUCTIVE SLEEP APNEA): ICD-10-CM

## 2017-08-30 LAB — MAGNESIUM SERPL-MCNC: 1.4 MG/DL

## 2017-08-30 PROCEDURE — 99214 OFFICE O/P EST MOD 30 MIN: CPT | Mod: S$GLB,,, | Performed by: INTERNAL MEDICINE

## 2017-08-30 PROCEDURE — 1159F MED LIST DOCD IN RCRD: CPT | Mod: S$GLB,,, | Performed by: INTERNAL MEDICINE

## 2017-08-30 PROCEDURE — 3008F BODY MASS INDEX DOCD: CPT | Mod: S$GLB,,, | Performed by: INTERNAL MEDICINE

## 2017-08-30 PROCEDURE — 3078F DIAST BP <80 MM HG: CPT | Mod: S$GLB,,, | Performed by: INTERNAL MEDICINE

## 2017-08-30 PROCEDURE — 36415 COLL VENOUS BLD VENIPUNCTURE: CPT

## 2017-08-30 PROCEDURE — 99499 UNLISTED E&M SERVICE: CPT | Mod: S$GLB,,, | Performed by: INTERNAL MEDICINE

## 2017-08-30 PROCEDURE — 3077F SYST BP >= 140 MM HG: CPT | Mod: S$GLB,,, | Performed by: INTERNAL MEDICINE

## 2017-08-30 PROCEDURE — 83735 ASSAY OF MAGNESIUM: CPT

## 2017-08-30 PROCEDURE — 1126F AMNT PAIN NOTED NONE PRSNT: CPT | Mod: S$GLB,,, | Performed by: INTERNAL MEDICINE

## 2017-08-30 PROCEDURE — 99999 PR PBB SHADOW E&M-EST. PATIENT-LVL III: CPT | Mod: PBBFAC,,, | Performed by: INTERNAL MEDICINE

## 2017-08-30 NOTE — PROGRESS NOTES
"Subjective:   Patient ID:  Myesha Quinones is a 77 y.o. female who presents for follow-up of Nonischemic cardiomyopathy (2 months fu)      HPI:   Myesha Quinones presents for follow up of non-ischemic cardiomyopathy with last EF 50% following CRT. Myesha Quinones has hypertension with BP at home 130-140s/. She has not as yet enrolled in the Digital Monitoring program. She could not tolerate the Doxazosin due to dizziness. Severe edema form calcium antagonist. Myesha Quinones is not exercising.Myesha Quinones denies chest pain, shortness of breath, palpitations, presyncope , or syncope. Myesha Quinones has dyslipidemia  on moderate intensity statin therapy with low LDL. Has non-obstructive CAD,...  .  Review of Systems   Respiratory:        KHOA wearing CPAP   Musculoskeletal: Positive for joint pain.       Current Outpatient Prescriptions   Medication Sig    albuterol (PROVENTIL) 2.5 mg /3 mL (0.083 %) nebulizer solution Take 3 mLs (2.5 mg total) by nebulization every 6 (six) hours as needed for Wheezing.    aspirin (ENTERIC COATED ASPIRIN) 81 MG EC tablet Take 1 tablet by mouth.    azelastine (ASTELIN) 137 mcg (0.1 %) nasal spray 1 spray (137 mcg total) by Nasal route 2 (two) times daily. For severe allergy    BD INSULIN PEN NEEDLE UF MINI 31 gauge x 3/16" Ndle USE WITH LANTUS AT BEDTIME    blood sugar diagnostic (ACCU-CHEK HECTOR) Strp Uses Accu-Check Hector meter to test BG 4x/day (Patient taking differently: Uses Accu-Check Hector meter to test BG 4x/day checking 2x day)    blood sugar diagnostic (ONETOUCH ULTRA TEST) Strp To use as directed with One Touch Ultra Meter and monitor blood sugars TID    blood-glucose meter kit Dispense Accu-Chek Hector meter. Use as instructed    carvedilol (COREG) 25 MG tablet TAKE 2 TABLETS BY MOUTH TWICE A DAY    chlorthalidone (HYGROTEN) 25 MG Tab Take 1 tablet (25 mg total) by mouth once daily.    fluticasone-salmeterol 250-50 mcg/dose " "(ADVAIR) 250-50 mcg/dose diskus inhaler Inhale 1 puff into the lungs 2 (two) times daily. This is a change from one month    glimepiride (AMARYL) 2 MG tablet TAKE 1 TABLET BY MOUTH TWICE A DAY    hydrALAZINE (APRESOLINE) 100 MG tablet Take 1 tablet (100 mg total) by mouth 3 (three) times daily.    insulin glargine (LANTUS SOLOSTAR) 100 unit/mL (3 mL) InPn pen INJECT 40 UNITS INTO THE SKIN EVERY EVENING.    lancets (ACCU-CHEK SOFTCLIX LANCETS) Misc Uses Accu-Chek Angelica meter to test BG 4x/day    linagliptin (TRADJENTA) 5 mg Tab tablet Take 1 tablet (5 mg total) by mouth once daily.    metformin (GLUCOPHAGE) 500 MG tablet Take 2 tablets (1,000 mg total) by mouth 2 (two) times daily with meals.    nitroGLYCERIN (NITROSTAT) 0.4 MG SL tablet Place 1 tablet under the tongue continuous prn.      omega-3 fatty acids (FISH OIL) 500 mg Cap Take 1 capsule by mouth Twice daily.    pen needle, diabetic (BD INSULIN PEN NEEDLE UF MINI) 31 gauge x 3/16" Ndle USE WITH LANTUS AT BEDTIME    simvastatin (ZOCOR) 20 MG tablet Take 1 tablet (20 mg total) by mouth every evening.    valsartan (DIOVAN) 320 MG tablet Take 1 tablet (320 mg total) by mouth once daily.     No current facility-administered medications for this visit.      Objective:   Physical Exam   Constitutional: She is oriented to person, place, and time. She appears well-developed. No distress.   BP (!) 152/72 (BP Location: Left arm, Patient Position: Sitting, BP Method: X-Large (Automatic))   Pulse 87   Ht 5' 3" (1.6 m)   Wt 74 kg (163 lb 2.3 oz)   BMI 28.90 kg/m²    HENT:   Head: Normocephalic.   Eyes: Conjunctivae are normal. Pupils are equal, round, and reactive to light. No scleral icterus.   Neck: Neck supple. No JVD present. No thyromegaly present.   Cardiovascular: Normal rate, regular rhythm, normal heart sounds and intact distal pulses.  PMI is not displaced.  Exam reveals no gallop and no friction rub.    No murmur heard.  1-2+ left pedal edema.  "   Pulmonary/Chest: Effort normal and breath sounds normal. No respiratory distress. She has no wheezes. She has no rales.   Abdominal: Soft. She exhibits no distension. There is no splenomegaly or hepatomegaly. There is no tenderness.   Musculoskeletal: She exhibits no edema or tenderness.   Gait normal  Right arm lymphedema   Neurological: She is alert and oriented to person, place, and time.   Skin: Skin is warm and dry. She is not diaphoretic.   Psychiatric: She has a normal mood and affect. Her behavior is normal.       Lab Results   Component Value Date     08/07/2017    K 4.5 08/07/2017     08/07/2017    CO2 25 08/07/2017    BUN 22 08/07/2017    CREATININE 1.2 08/07/2017     (H) 08/07/2017    HGBA1C 7.7 (H) 08/07/2017    MG 1.5 (L) 12/20/2012    AST 20 08/07/2017    ALT 23 08/07/2017    ALBUMIN 3.6 08/07/2017    PROT 6.9 08/07/2017    BILITOT 0.3 08/07/2017    WBC 8.52 08/07/2017    HGB 11.3 (L) 08/07/2017    HCT 35.3 (L) 08/07/2017    MCV 91 08/07/2017     08/07/2017    TSH 0.560 08/07/2017    CHOL 102 (L) 05/11/2017    HDL 42 05/11/2017    LDLCALC 39.8 (L) 05/11/2017    TRIG 101 05/11/2017       Assessment:     1. Nonischemic cardiomyopathy : EF 50% -compensated   2. Biventricular ICD (implantable cardioverter-defibrillator) in place    3. Chronic systolic heart failure : Compensated   4. Essential hypertension : Near controlled   5. Pure hypercholesterolemia : At goal   6. KHOA (obstructive sleep apnea) : Uses CPAP       Plan:     Myesha was seen today for nonischemic cardiomyopathy.    Diagnoses and all orders for this visit:    Nonischemic cardiomyopathy  Continue current regimen    Biventricular ICD (implantable cardioverter-defibrillator) in place    Chronic systolic heart failure  Continue current regimen    Essential hypertension  -     Magnesium; Future; Expected date: 08/30/2017  To enroll today in the [facility] [facility] monitoring program. If not adequate control  consideration for [facility] trial of renal denervation.  Pure hypercholesterolemia  Continue current regimen    KHOA (obstructive sleep apnea)    Graded exercise for 30 minutes a day at least 5 days a week suggested.

## 2017-08-30 NOTE — PATIENT INSTRUCTIONS
Continue current regimen  Make sure you have reduced sodium ( salt intake )  Graded exercise for 30 minutes a day at least 5 days a week suggested.  Enroll in the Digital medicine blood pressure monitoring program.

## 2017-08-31 DIAGNOSIS — E83.42 HYPOMAGNESEMIA: ICD-10-CM

## 2017-08-31 RX ORDER — LANOLIN ALCOHOL/MO/W.PET/CERES
400 CREAM (GRAM) TOPICAL 2 TIMES DAILY
Qty: 180 TABLET | Refills: 3 | COMMUNITY
Start: 2017-08-31 | End: 2017-12-08

## 2017-09-07 ENCOUNTER — PATIENT OUTREACH (OUTPATIENT)
Dept: OTHER | Facility: OTHER | Age: 77
End: 2017-09-07

## 2017-09-07 NOTE — LETTER
Darline Armstrong, Miguel  [address] [place] [address]     Dear Myesha Quinones,    Welcome to the [facility]!         My name is Darline Armstrong PharmD and I am your dedicated Digital Medicine clinician.  As an expert in medication management, I will help ensure that the medications you are taking continue to provide you with the intended benefits.      I am Karin Branch and I will be your health  for the duration of the program.  My  job is to help you identify lifestyle changes to improve your blood pressure control.  We will talk about nutrition, exercise, and other ways that you may be able to adjust your current habits to better your health. Together, we will work to improve your overall health and encourage you to meet your goals for a healthier lifestyle.    What we expect from YOU:    You will need to take blood pressure readings multiple times a week and no less than one reading per week.   It is important that you take your measurements at different times during the day, when possible.     What you should expect from your Digital Medicine Care Team:   We will provide you with education about high blood pressure, including lifestyle changes that could help you to control your blood pressure.   We will review your weekly readings and provide you with monthly blood pressure progress reports after you have been in the program for more than 30 days.   We will send monthly progress reports on your blood pressure control to your physician so they can follow along with your progress as well.    You will be able to reach me by phone at [phone] or through your [facility] account by clicking my name under Care Team on the right side of the home screen.    I look forward to working with you to achieve your blood pressure goals!    Sincerely,    Darline Armstrong PharmD  Your personal clinician    Please visit  [email or web address] to learn more about high blood pressure and what you can do lower your blood pressure.                                                                                           Myesha Quinones  [phone] [place] [id number]

## 2017-09-14 ENCOUNTER — TELEPHONE (OUTPATIENT)
Dept: INTERNAL MEDICINE | Facility: CLINIC | Age: 77
End: 2017-09-14

## 2017-09-14 NOTE — TELEPHONE ENCOUNTER
Patient was notified that 2 boxes of lantus were delivered here for her. She picked this up today. L

## 2017-09-28 ENCOUNTER — PATIENT OUTREACH (OUTPATIENT)
Dept: OTHER | Facility: OTHER | Age: 77
End: 2017-09-28

## 2017-09-28 NOTE — PROGRESS NOTES
Last 5 Patient Entered Readings Current 30 Day Average: 155/71     Recent Readings 9/27/2017 9/26/2017 9/25/2017 9/24/2017 9/21/2017    Systolic BP (mmHg) 111 180 159 163 148    Diastolic BP (mmHg) 61 55 85 79 65    Pulse 81 83 85 87 81          Hypertension Digital Medicine Program (HDMP): Health  Introduction    Introduced Mrs. Myesha Quinones to HDMP. Discussed health  role and recommended lifestyle modifications.    Diet (i.e. Low sodium, food labels): Patient reports that she tries to eat a diet high in fruits and vegetables and that she enjoys eating vegetables.  Stated that she does not add salt to any of her food, and that they do go out to eat on the weekends.  She said she tries to read food labels, but was unsure what to look for.  Educated patient on serving size, food labels, and sodium recommendations with DASH diet.  I also reviewed with patient strategies for reducing sodium while dining out.     Exercise: Patient reports that she does not do any formal exercise, but that she tries to do yard work and work around her house.  She states SOB from asthma and CHF make exercising uncomfortable for her.  Reinforced to patient that even small ten minute bouts of walking are more than enough and stressed the importance of regular movement for overall heart health.     Alcohol/Tobacco: Mrs. Quinones states she does not smoke or drink alcohol.     Medication Adherence: has been compliant with the medicaiton regimen     Other goals: Mrs. Quinones states she does not have any other goals aside from trying to decrease her blood pressure.  She reports her hobbies are doing things for the [employer] and going to the casino on occasion.  She will benefit from continued support and reinforcement of positive lifestyle change behaviors. I will follow up with her in 3 weeks.     Reviewed AHA recommendations:  Limit sodium intake to <2000mg/day  Perform 150 minutes of physical activity per week    Patient is  aware of the importance of taking daily BP readings at various times of the day  Patient aware that the health  can be used as a resource for lifestyle modifications to help reduce or maintain a healthy BP  Patient is aware of the importance of medication adherence.  [facility]. Patient should call 911 or [facility] on [facility] if one arises.

## 2017-10-02 ENCOUNTER — PATIENT OUTREACH (OUTPATIENT)
Dept: OTHER | Facility: OTHER | Age: 77
End: 2017-10-02

## 2017-10-02 NOTE — PROGRESS NOTES
Last 5 Patient Entered Redings Current 30 Day Average: 156/72     Recent Readings 10/1/2017 9/28/2017 9/27/2017 9/26/2017 9/25/2017    Systolic BP (mmHg) 180 152 111 180 159    Diastolic BP (mmHg) 80 71 61 55 85    Pulse 85 90 81 83 85          Hypertension Digital Medicine Program (HDMP): Health  Follow Up    Called patient to follow up regarding high reading of 180/80 mmHg.  Mrs. Quinones reported that she did not have any symptoms or eat anything differently yesterday, but that she did have a stressful day.  A family member was hospitalized yesterday, and she believes that this is what lead to the elevated blood pressure.  Encouraged patient to repeat her reading after taking her medication today, and reinforced proper blood pressure cuff placement and technique.   When patient repeated reading her blood pressure was at 138/73. Will follow up in a few weeks and discuss with Pharm D.

## 2017-10-03 ENCOUNTER — TELEPHONE (OUTPATIENT)
Dept: INTERNAL MEDICINE | Facility: CLINIC | Age: 77
End: 2017-10-03

## 2017-10-03 DIAGNOSIS — H65.30 CHRONIC MUCOID OTITIS MEDIA, UNSPECIFIED LATERALITY: ICD-10-CM

## 2017-10-03 DIAGNOSIS — H60.12 CELLULITIS OF EAR, LEFT: Primary | ICD-10-CM

## 2017-10-03 NOTE — TELEPHONE ENCOUNTER
----- Message from Edy Oliveros sent at 10/2/2017 10:51 AM CDT -----  Contact: Carla/ENT/ Dr. January Cao/ [phone] [facility] is calling to request a back dated referral for the pt's visit on 08/29/2017 with the doctor above.  The pt is not in their network and needs to have a referral sent in.   The pt's diagnosis is Cellulitis of the left ear and chronic mucoid otitis media and eustachian tube disfunction.  Please call and advise.    Thank you

## 2017-10-04 ENCOUNTER — TELEPHONE (OUTPATIENT)
Dept: ENDOCRINOLOGY | Facility: CLINIC | Age: 77
End: 2017-10-04

## 2017-10-04 DIAGNOSIS — R80.9 TYPE 2 DIABETES MELLITUS WITH MICROALBUMINURIA, WITH LONG-TERM CURRENT USE OF INSULIN: Primary | ICD-10-CM

## 2017-10-04 DIAGNOSIS — E11.29 TYPE 2 DIABETES MELLITUS WITH MICROALBUMINURIA, WITH LONG-TERM CURRENT USE OF INSULIN: Primary | ICD-10-CM

## 2017-10-04 DIAGNOSIS — Z79.4 TYPE 2 DIABETES MELLITUS WITH MICROALBUMINURIA, WITH LONG-TERM CURRENT USE OF INSULIN: Primary | ICD-10-CM

## 2017-10-04 NOTE — TELEPHONE ENCOUNTER
----- Message from Chalo Dyer sent at 10/4/2017  9:38 AM CDT -----  Contact: Pt   Pt would like the nurse to give her a call back in regards to her blood work .. Contact  number [phone]..

## 2017-10-05 ENCOUNTER — LAB VISIT (OUTPATIENT)
Dept: LAB | Facility: HOSPITAL | Age: 77
End: 2017-10-05
Attending: NURSE PRACTITIONER
Payer: MEDICARE

## 2017-10-05 DIAGNOSIS — E11.29 TYPE 2 DIABETES MELLITUS WITH MICROALBUMINURIA, WITH LONG-TERM CURRENT USE OF INSULIN: ICD-10-CM

## 2017-10-05 DIAGNOSIS — R80.9 TYPE 2 DIABETES MELLITUS WITH MICROALBUMINURIA, WITH LONG-TERM CURRENT USE OF INSULIN: ICD-10-CM

## 2017-10-05 DIAGNOSIS — Z79.4 TYPE 2 DIABETES MELLITUS WITH MICROALBUMINURIA, WITH LONG-TERM CURRENT USE OF INSULIN: ICD-10-CM

## 2017-10-05 LAB
ESTIMATED AVG GLUCOSE: 171 MG/DL
HBA1C MFR BLD HPLC: 7.6 %

## 2017-10-05 PROCEDURE — 36415 COLL VENOUS BLD VENIPUNCTURE: CPT | Mod: PO

## 2017-10-05 PROCEDURE — 83036 HEMOGLOBIN GLYCOSYLATED A1C: CPT

## 2017-10-11 ENCOUNTER — TELEPHONE (OUTPATIENT)
Dept: INTERNAL MEDICINE | Facility: CLINIC | Age: 77
End: 2017-10-11

## 2017-10-11 NOTE — TELEPHONE ENCOUNTER
----- Message from Sabina Webb sent at 10/6/2017 11:17 AM CDT -----  Contact: Carla/ January Cao/ [phone]   Carla is calling to speak with someone in the office about the referral that was sent to them. She stated the doctor need to call the insurance company to receive approval. Please call and advise     Thank you

## 2017-10-12 ENCOUNTER — OFFICE VISIT (OUTPATIENT)
Dept: ENDOCRINOLOGY | Facility: CLINIC | Age: 77
End: 2017-10-12
Payer: MEDICARE

## 2017-10-12 VITALS
BODY MASS INDEX: 28.53 KG/M2 | DIASTOLIC BLOOD PRESSURE: 68 MMHG | WEIGHT: 161 LBS | HEIGHT: 63 IN | HEART RATE: 69 BPM | SYSTOLIC BLOOD PRESSURE: 118 MMHG

## 2017-10-12 DIAGNOSIS — G47.33 OSA (OBSTRUCTIVE SLEEP APNEA): ICD-10-CM

## 2017-10-12 DIAGNOSIS — I10 ESSENTIAL HYPERTENSION: ICD-10-CM

## 2017-10-12 DIAGNOSIS — E78.00 PURE HYPERCHOLESTEROLEMIA: ICD-10-CM

## 2017-10-12 DIAGNOSIS — N18.30 TYPE 2 DIABETES MELLITUS WITH STAGE 3 CHRONIC KIDNEY DISEASE, WITH LONG-TERM CURRENT USE OF INSULIN: ICD-10-CM

## 2017-10-12 DIAGNOSIS — E11.22 TYPE 2 DIABETES MELLITUS WITH STAGE 3 CHRONIC KIDNEY DISEASE, WITH LONG-TERM CURRENT USE OF INSULIN: ICD-10-CM

## 2017-10-12 DIAGNOSIS — I42.8 NONISCHEMIC CARDIOMYOPATHY: ICD-10-CM

## 2017-10-12 DIAGNOSIS — E11.42 DIABETIC POLYNEUROPATHY ASSOCIATED WITH TYPE 2 DIABETES MELLITUS: Primary | ICD-10-CM

## 2017-10-12 DIAGNOSIS — Z79.4 TYPE 2 DIABETES MELLITUS WITH STAGE 3 CHRONIC KIDNEY DISEASE, WITH LONG-TERM CURRENT USE OF INSULIN: ICD-10-CM

## 2017-10-12 DIAGNOSIS — M81.0 OSTEOPOROSIS, UNSPECIFIED OSTEOPOROSIS TYPE, UNSPECIFIED PATHOLOGICAL FRACTURE PRESENCE: ICD-10-CM

## 2017-10-12 DIAGNOSIS — E04.2 NONTOXIC MULTINODULAR GOITER: ICD-10-CM

## 2017-10-12 PROCEDURE — 99214 OFFICE O/P EST MOD 30 MIN: CPT | Mod: S$GLB,,, | Performed by: NURSE PRACTITIONER

## 2017-10-12 PROCEDURE — 99999 PR PBB SHADOW E&M-EST. PATIENT-LVL III: CPT | Mod: PBBFAC,,, | Performed by: NURSE PRACTITIONER

## 2017-10-12 RX ORDER — GLIMEPIRIDE 2 MG/1
TABLET ORAL
Qty: 180 TABLET | Refills: 3
Start: 2017-10-12 | End: 2018-04-05 | Stop reason: SDUPTHER

## 2017-10-12 NOTE — PROGRESS NOTES
"CC: Patient is here for management of Type 2 diabetes and review of medical conditions as listed in visit diagnosis.      HPI: Mrs. Myesha Quinones is a 77 y.o. White female who was diagnosed with Type 2 DM in 1992. She has a FH of diabetes, reporting her 3 children all have diabetes. No hospitalizations for DM. Reports she went into the coverage gap in August 2016 and at that time the cost of Tradjenta was $1,300, but she was able to obtain the medication through patient assistance.     Monitoring BG readings 2x/day.     Readings are  with one reading of 209.     No hypoglycemia.     Good appetite. Snacks between meals on chips.     No missed doses of diabetes medications.     Active with yardwork.     CURRENT DIABETIC MEDS: Lantus 40 units QHS, Tradjenta 5 mg daily, Glimepiride 2 mg bid, Metformin 500 mg with breakfast, 500 mg with lunch, 1,000 PM with dinner     Last Podiatry Exam: May 2017    REVIEW OF SYSTEMS  General: no weakness, + occasional fatigue; no weight changes.   Eyes: no double or blurred vision, eye pain, or redness; last eye exam=October 2016.   Cardiovascular: no chest pain, palpitations, edema, or murmurs.   Respiratory: no cough or dyspnea.   GI: no heartburn, nausea, or changes in bowel patterns; good appetite.   Skin: no rashes, dryness, itching, or reactions at insulin injection sites.   Neuro: numbness, tingling in right fingers that is worst in the morning.   Endocrine: no polyuria, polydipsia, polyphagia, heat or cold intolerance.     Vital Signs  /68   Pulse 69   Ht 5' 3" (1.6 m)   Wt 73 kg (161 lb)   BMI 28.52 kg/m²     Hemoglobin A1C   Date Value Ref Range Status   10/05/2017 7.6 (H) 4.0 - 5.6 % Final     Comment:     According to ADA guidelines, hemoglobin A1c <7.0% represents  optimal control in non-pregnant diabetic patients. Different  metrics may apply to specific patient populations.   Standards of Medical Care in Diabetes-2016.  For the purpose of screening " for the presence of diabetes:  <5.7%     Consistent with the absence of diabetes  5.7-6.4%  Consistent with increasing risk for diabetes   (prediabetes)  >or=6.5%  Consistent with diabetes  Currently, no consensus exists for use of hemoglobin A1c  for diagnosis of diabetes for children.  This Hemoglobin A1c assay has significant interference with fetal   hemoglobin   (HbF). The results are invalid for patients with abnormal amounts of   HbF,   including those with known Hereditary Persistence   of Fetal Hemoglobin. Heterozygous hemoglobin variants (HbAS, HbAC,   HbAD, HbAE, HbA2) do not significantly interfere with this assay;   however, presence of multiple variants in a sample may impact the %   interference.     08/07/2017 7.7 (H) 4.0 - 5.6 % Final     Comment:     According to ADA guidelines, hemoglobin A1c <7.0% represents  optimal control in non-pregnant diabetic patients. Different  metrics may apply to specific patient populations.   Standards of Medical Care in Diabetes-2016.  For the purpose of screening for the presence of diabetes:  <5.7%     Consistent with the absence of diabetes  5.7-6.4%  Consistent with increasing risk for diabetes   (prediabetes)  >or=6.5%  Consistent with diabetes  Currently, no consensus exists for use of hemoglobin A1c  for diagnosis of diabetes for children.  This Hemoglobin A1c assay has significant interference with fetal   hemoglobin   (HbF). The results are invalid for patients with abnormal amounts of   HbF,   including those with known Hereditary Persistence   of Fetal Hemoglobin. Heterozygous hemoglobin variants (HbAS, HbAC,   HbAD, HbAE, HbA2) do not significantly interfere with this assay;   however, presence of multiple variants in a sample may impact the %   interference.     05/11/2017 7.5 (H) 4.5 - 6.2 % Final     Comment:     According to ADA guidelines, hemoglobin A1C <7.0% represents  optimal control in non-pregnant diabetic patients.  Different  metrics may  apply to specific populations.   Standards of Medical Care in Diabetes - 2016.  For the purpose of screening for the presence of diabetes:  <5.7%     Consistent with the absence of diabetes  5.7-6.4%  Consistent with increasing risk for diabetes   (prediabetes)  >or=6.5%  Consistent with diabetes  Currently no consensus exists for use of hemoglobin A1C  for diagnosis of diabetes for children.     05/11/2017 7.5 (H) 4.5 - 6.2 % Final     Comment:     According to ADA guidelines, hemoglobin A1C <7.0% represents  optimal control in non-pregnant diabetic patients.  Different  metrics may apply to specific populations.   Standards of Medical Care in Diabetes - 2016.  For the purpose of screening for the presence of diabetes:  <5.7%     Consistent with the absence of diabetes  5.7-6.4%  Consistent with increasing risk for diabetes   (prediabetes)  >or=6.5%  Consistent with diabetes  Currently no consensus exists for use of hemoglobin A1C  for diagnosis of diabetes for children.        Lab Results   Component Value Date    CREATININE 1.2 08/07/2017      Lab Results   Component Value Date    TSH 0.560 08/07/2017      Lab Results   Component Value Date    LDLCALC 39.8 (L) 05/11/2017        PHYSICAL EXAMINATION  Constitutional: Appears well, no distress  Neck: Supple, trachea midline.   Respiratory: CTA without wheezes, even and unlabored.  Cardiovascular: RRR; no carotid bruits or murmurs.   Lymph: DP pulses  2+ bilaterally; no edema.   Skin: warm and dry; no injection site reactions, no acanthosis nigracans observed.  Neuro:patient alert and cooperative, normal affect.    Diabetes Foot Exam:   Protective Sensation (w/ 10 gram monofilament):  Right: Intact  Left: Decreased    Visual Inspection:  Normal -  Bilateral    Pedal Pulses:   Right: Present  Left: Present    Posterior tibialis:   Right:Present  Left: Present     Assessment/Plan  Diabetic polyneuropathy associated with type 2 diabetes mellitus:   DM stable considering  age, comorbidities  A1C goal ~7.5%  Continue Lantus 40 units nightly  Decrease Glimepiride to 2 mg once daily  Continue Tradjenta and current dose of Metformin  Continue to monitor renal status with Metformin, d/c for crt 1.5 or higher   Monitor BG 3x/day.   Logs to next visit.     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      Essential hypertension  Stable  Continue Coreg, Chlorthalidone, Apresoline, Diovan. Managed by Cardiology.     Nontoxic multinodular goiter  Monitoring yearly. Previously had FNA of left thyroid nodule 6/23/16, right thyroid nodule 4/2014. Clinically euthyroid.   Last thyroid US this year--recommend to repeat in 1 year.     KHOA (obstructive sleep apnea)  Continue nightly c-pap use  Sleep Medicine appointment November 1, 2017      FOLLOW UP  Return in about 4 months (around 2/12/2018).

## 2017-10-18 RX ORDER — VALSARTAN 320 MG/1
320 TABLET ORAL DAILY
Qty: 90 TABLET | Refills: 3 | Status: SHIPPED | OUTPATIENT
Start: 2017-10-18 | End: 2018-07-30 | Stop reason: ALTCHOICE

## 2017-10-18 RX ORDER — CHLORTHALIDONE 25 MG/1
25 TABLET ORAL DAILY
Qty: 90 TABLET | Refills: 0 | Status: SHIPPED | OUTPATIENT
Start: 2017-10-18 | End: 2018-01-15 | Stop reason: SDUPTHER

## 2017-10-18 NOTE — TELEPHONE ENCOUNTER
----- Message from Lizet Quezada sent at 10/18/2017 10:44 AM CDT -----  Contact: STEFAN/Maya [phone]  Prescription Request:     Name of medication:   valsartan (DIOVAN) 320 MG tablet  chlorthalidone (HYGROTEN) 25 MG Tab    Reason for request: Refill    Pharmacy: [facility]ACY #[id number] - [place], [place] - [address] AT [facility]    Please advise.    Thank You

## 2017-10-19 ENCOUNTER — OFFICE VISIT (OUTPATIENT)
Dept: OPTOMETRY | Facility: CLINIC | Age: 77
End: 2017-10-19
Payer: MEDICARE

## 2017-10-19 ENCOUNTER — PATIENT OUTREACH (OUTPATIENT)
Dept: OTHER | Facility: OTHER | Age: 77
End: 2017-10-19

## 2017-10-19 DIAGNOSIS — H52.4 PRESBYOPIA: ICD-10-CM

## 2017-10-19 DIAGNOSIS — Z96.1 PSEUDOPHAKIA OF BOTH EYES: ICD-10-CM

## 2017-10-19 DIAGNOSIS — Z13.5 SCREENING FOR GLAUCOMA: ICD-10-CM

## 2017-10-19 DIAGNOSIS — E11.3212 MILD NONPROLIFERATIVE DIABETIC RETINOPATHY OF LEFT EYE WITH MACULAR EDEMA ASSOCIATED WITH TYPE 2 DIABETES MELLITUS: Primary | ICD-10-CM

## 2017-10-19 PROCEDURE — 92014 COMPRE OPH EXAM EST PT 1/>: CPT | Mod: S$GLB,,, | Performed by: OPTOMETRIST

## 2017-10-19 PROCEDURE — 99999 PR PBB SHADOW E&M-EST. PATIENT-LVL III: CPT | Mod: PBBFAC,,, | Performed by: OPTOMETRIST

## 2017-10-19 PROCEDURE — 99499 UNLISTED E&M SERVICE: CPT | Mod: S$GLB,,, | Performed by: OPTOMETRIST

## 2017-10-19 PROCEDURE — 92015 DETERMINE REFRACTIVE STATE: CPT | Mod: S$GLB,,, | Performed by: OPTOMETRIST

## 2017-10-19 NOTE — PROGRESS NOTES
"Last 5 Patient Entered Redings Current 30 Day Average: 151/71     Recent Readings 10/18/2017 10/16/2017 10/15/2017 10/13/2017 10/12/2017    Systolic BP (mmHg) 151 164 150 160 133    Diastolic BP (mmHg) 67 75 63 76 62    Pulse 81 72 84 80 83        Hypertension Digital Medicine Program (HDMP): Health  Follow Up    Lifestyle Modifications:    1.Low sodium diet: yes Patient reports that she has been reading food labels and selecting low sodium foods.  States she has slowly been decreasing her salt intake over the last two weeks and continues to not add it to her food.  Has been utilizing "salt-free" seasonings per patient.  Praised for her progress in integrating low sodium options.  May benefit from resources on seasonings in future.     2.Physical activity: no States that she has not been exercising "due to laziness".  Empathized with patient and discussed how her reported low energy levels and recent stress have made it challenging for her to be active. Reinforced benefits of movement for her overall health, and discussed deep breathing strategies to help manage stress.  Educated patient on inhale 2, exhale 3 method of deep breathing. She may benefit from further stress management or breathing resources, as she reports that she currently does not use any strategies during stressful times.     3.Hypotension/Hypertension symptoms: yes.  Patient reports feeling fatigued, but is unsure if this is related to blood pressure, recent stress, or lack of activity. States she has felt low on energy for the last week.       4.Patient has been compliant with the medication regimen per her report.     Follow up with Mrs. Myesha Quinones completed. No further questions or concerns. I will follow up in a few weeks to assess progress. Consider reviewing deep breathing and seasonings with patient.         "

## 2017-10-19 NOTE — PATIENT INSTRUCTIONS
Using the Amsler Grid  If you are at risk for vision loss, you may be told to check your eyesight regularly using the Amsler grid. Below is the grid and instructions for using it.         The Amsler grid helps you track changes in your vision.    How to Use the Amsler Grid  1. Use the grid in a well-lighted area.  2. Wear glasses or contact lenses if you usually wear them.  3. Hold the grid at your normal reading distance (about 16 inches).  4. Cover your left eye.  5. With your right eye, look at the dot in the center of the grid.  6. While looking at the dot, notice if any of the lines look wavy, if any lines disappear, or if the boxes change shape.  7. Write down on a piece of paper any vision changes from the last time you used the grid.  8. Now repeat the exercise, this time covering your right eye.  9. Call your doctor right away if you notice any vision changes.  How Often Should I Check My Vision?  Use the Amsler grid as often as your eye doctor suggests. Keep the grid where youll remember to use it. Call your eye doctor right away if you notice any changes with your eyesight. This includes if your vision improves.  © 1597-1521 [employer], [address] [place] [address], [place] [address]. All rights reserved. This information is not intended as a substitute for professional medical care. Always follow your healthcare professional's instructions.

## 2017-10-19 NOTE — PROGRESS NOTES
HPI     DLS: 10/18/2016  Pt states va is blurry at times with glasses. Vision is distorted in the   left eye when reading  Occasional floaters  Denies flashes of light   Feels left eye blurrier    No gtts     DM   Sp PC IOL OU     Did not check BS this morning   Hemoglobin A1C       Date                     Value               Ref Range             Status                10/05/2017               7.6 (H)             4.0 - 5.6 %           Final                  08/07/2017               7.7 (H)             4.0 - 5.6 %           Final                  05/11/2017               7.5 (H)             4.5 - 6.2 %           Final                  05/11/2017               7.5 (H)             4.5 - 6.2 %           Final              ----------      Last edited by Boaz Young, OD on 10/19/2017  9:05 AM. (History)        ROS     Positive for: Endocrine (DM), Eyes (cat surgery OU)    Negative for: Constitutional, Gastrointestinal, Neurological, Skin,   Genitourinary, Musculoskeletal, HENT, Cardiovascular, Respiratory,   Psychiatric, Allergic/Imm, Heme/Lymph    Last edited by Boaz Young, OD on 10/19/2017  9:05 AM. (History)        Assessment /Plan     For exam results, see Encounter Report.    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 Mild pco sp pciol ou  2. fam hx glauc  3. DM- with mild NPDR OS/ MAs in mac/CSME  4.  ocular migraine Hx    PLAN:    1. Instructed on amsler grid use  2. rtc 3 months to recheck DFE, or immediately if amsler changes

## 2017-10-25 ENCOUNTER — PATIENT OUTREACH (OUTPATIENT)
Dept: OTHER | Facility: OTHER | Age: 77
End: 2017-10-25

## 2017-10-25 DIAGNOSIS — I10 ESSENTIAL HYPERTENSION: Primary | ICD-10-CM

## 2017-10-25 RX ORDER — SPIRONOLACTONE 25 MG/1
25 TABLET ORAL DAILY
Qty: 30 TABLET | Refills: 3 | Status: SHIPPED | OUTPATIENT
Start: 2017-10-25 | End: 2017-11-07 | Stop reason: SDUPTHER

## 2017-10-25 NOTE — PROGRESS NOTES
Last 5 Patient Entered [employer] Current 30 Day Average: 150/70     Recent Readings 10/25/2017 10/22/2017 10/21/2017 10/20/2017 10/18/2017    Systolic BP (mmHg) 159 127 141 166 151    Diastolic BP (mmHg) 72 58 64 76 67    Pulse 82 82 81 75 81        Called Ms. Quinones to introduce her to the Hypertension Digital Medicine Program.     Ms. Quinones's BP average is above goal. Ideally, her BP average would be <130/80 due to commodities of DM and HTN. She does have an [employer] cuff at home that she uses to check her BP sometimes and says that it gives her lower BP readings than the [employer] cuff. She has the adult large cuff, which is the largest size available. Her BP at cardiology clinic on 8/30/17 was 152/72, which is consistent with her average based on home readings. She was unable to tolerate amlodipine due to edema. She is on maximum doses of hydralazine, carvedilol, and valsartan. Ms. Quinones also has CHF (EF of 50%). Will add spironolactone 25 mg QD and will check BMP on 10/31/17. Will follow up with Ms. Quinones once lab results are reviewed. Asked that she call should she experience and side effects when starting spironolactone.     Reviewed patient's medications and verified allergies on file.   Hypertension Medications             carvedilol (COREG) 25 MG tablet TAKE 2 TABLETS BY MOUTH TWICE A DAY    chlorthalidone (HYGROTEN) 25 MG Tab Take 1 tablet (25 mg total) by mouth once daily.    hydrALAZINE (APRESOLINE) 100 MG tablet Take 1 tablet (100 mg total) by mouth 3 (three) times daily.    nitroGLYCERIN (NITROSTAT) 0.4 MG SL tablet Place 1 tablet under the tongue continuous prn.      spironolactone (ALDACTONE) 25 MG tablet Take 1 tablet (25 mg total) by mouth once daily.    valsartan (DIOVAN) 320 MG tablet Take 1 tablet (320 mg total) by mouth once daily.        Explained that we expect her to obtain several blood pressures/week at random times of day. Also asked that the BP be taken at least 1 hour after taking BP  medications.     Explained that our goal is to get her BP to consistently below 140/90mmHg.     Patient and I agreed that the patient will take her BP daily to every other day at varying times of the day.     Emailed patient link to [facility]'s HTN webpage as well as my direct phone number in case she has any questions.

## 2017-10-31 ENCOUNTER — LAB VISIT (OUTPATIENT)
Dept: LAB | Facility: HOSPITAL | Age: 77
End: 2017-10-31
Attending: INTERNAL MEDICINE
Payer: MEDICARE

## 2017-10-31 DIAGNOSIS — I10 ESSENTIAL HYPERTENSION: ICD-10-CM

## 2017-10-31 LAB
ANION GAP SERPL CALC-SCNC: 12 MMOL/L
BUN SERPL-MCNC: 27 MG/DL
CALCIUM SERPL-MCNC: 9.8 MG/DL
CHLORIDE SERPL-SCNC: 103 MMOL/L
CO2 SERPL-SCNC: 24 MMOL/L
CREAT SERPL-MCNC: 1.2 MG/DL
EST. GFR  (AFRICAN AMERICAN): 50 ML/MIN/1.73 M^2
EST. GFR  (NON AFRICAN AMERICAN): 43.4 ML/MIN/1.73 M^2
GLUCOSE SERPL-MCNC: 133 MG/DL
MAGNESIUM SERPL-MCNC: 1.4 MG/DL
POTASSIUM SERPL-SCNC: 4.9 MMOL/L
SODIUM SERPL-SCNC: 139 MMOL/L

## 2017-10-31 PROCEDURE — 83735 ASSAY OF MAGNESIUM: CPT

## 2017-10-31 PROCEDURE — 80048 BASIC METABOLIC PNL TOTAL CA: CPT

## 2017-10-31 PROCEDURE — 36415 COLL VENOUS BLD VENIPUNCTURE: CPT | Mod: PO

## 2017-11-01 ENCOUNTER — TELEPHONE (OUTPATIENT)
Dept: CARDIOLOGY | Facility: CLINIC | Age: 77
End: 2017-11-01

## 2017-11-01 DIAGNOSIS — E83.42 HYPOMAGNESEMIA: Primary | ICD-10-CM

## 2017-11-01 NOTE — TELEPHONE ENCOUNTER
Myesha Quinones called and results of lab tests reviewed.She is not taking Mag-Ox. Mg++ low therefore instructed to begin and will recheck in 2 weeks.

## 2017-11-07 ENCOUNTER — PATIENT OUTREACH (OUTPATIENT)
Dept: OTHER | Facility: OTHER | Age: 77
End: 2017-11-07

## 2017-11-07 DIAGNOSIS — I10 ESSENTIAL HYPERTENSION: ICD-10-CM

## 2017-11-07 RX ORDER — SPIRONOLACTONE 25 MG/1
50 TABLET ORAL DAILY
Qty: 60 TABLET | Refills: 3 | Status: SHIPPED | OUTPATIENT
Start: 2017-11-07 | End: 2017-11-27

## 2017-11-07 NOTE — PROGRESS NOTES
Last 5 Patient Entered [employer] Current 30 Day Average: 153/72     Recent Readings 11/7/2017 11/6/2017 11/4/2017 11/3/2017 11/2/2017    Systolic BP (mmHg) 153 148 171 142 139    Diastolic BP (mmHg) 72 71 77 74 85    Pulse 83 81 79 74 77        Ms. uQinones's BP has not improved since starting spironolactone 25 mg QD. She denies any side effects since starting it. She acknowledges that her BP has not changed. BMP on 10/31/17 was WNL. Will increase spironolactone to 50 mg QD and repeat BMP on 11/16/17. Ms. Quinones also sees Dr. Gaston this day. Will review clinic note for any further recommendations or medication changes.     Current HTN regimen:  Hypertension Medications             carvedilol (COREG) 25 MG tablet TAKE 2 TABLETS BY MOUTH TWICE A DAY    chlorthalidone (HYGROTEN) 25 MG Tab Take 1 tablet (25 mg total) by mouth once daily.    hydrALAZINE (APRESOLINE) 100 MG tablet Take 1 tablet (100 mg total) by mouth 3 (three) times daily.    nitroGLYCERIN (NITROSTAT) 0.4 MG SL tablet Place 1 tablet under the tongue continuous prn.      spironolactone (ALDACTONE) 25 MG tablet Take 2 tablets (50 mg total) by mouth once daily.    valsartan (DIOVAN) 320 MG tablet Take 1 tablet (320 mg total) by mouth once daily.        Will continue to monitor regularly. Will follow up in 2-3 weeks, sooner if BP begins to trend upward or downward.    Patient has my contact information and knows to call with any concerns or clinical changes.

## 2017-11-09 RX ORDER — SIMVASTATIN 20 MG/1
20 TABLET, FILM COATED ORAL NIGHTLY
Qty: 90 TABLET | Refills: 3 | Status: SHIPPED | OUTPATIENT
Start: 2017-11-09 | End: 2018-11-16 | Stop reason: SDUPTHER

## 2017-11-14 ENCOUNTER — CLINICAL SUPPORT (OUTPATIENT)
Dept: ELECTROPHYSIOLOGY | Facility: CLINIC | Age: 77
End: 2017-11-14
Payer: MEDICARE

## 2017-11-14 DIAGNOSIS — Z95.810 ICD (IMPLANTABLE CARDIOVERTER-DEFIBRILLATOR) IN PLACE: ICD-10-CM

## 2017-11-14 DIAGNOSIS — I42.9 CARDIOMYOPATHY, PRIMARY: ICD-10-CM

## 2017-11-14 DIAGNOSIS — I50.9 CHF (CONGESTIVE HEART FAILURE): ICD-10-CM

## 2017-11-14 DIAGNOSIS — Z95.810 AICD (AUTOMATIC CARDIOVERTER/DEFIBRILLATOR) PRESENT: Primary | ICD-10-CM

## 2017-11-14 DIAGNOSIS — I42.8 NONISCHEMIC CARDIOMYOPATHY: ICD-10-CM

## 2017-11-14 DIAGNOSIS — I44.7 LBBB (LEFT BUNDLE BRANCH BLOCK): ICD-10-CM

## 2017-11-16 ENCOUNTER — IMMUNIZATION (OUTPATIENT)
Dept: INTERNAL MEDICINE | Facility: CLINIC | Age: 77
End: 2017-11-16
Payer: MEDICARE

## 2017-11-16 ENCOUNTER — HOSPITAL ENCOUNTER (OUTPATIENT)
Dept: RADIOLOGY | Facility: HOSPITAL | Age: 77
Discharge: HOME | End: 2017-11-16
Attending: INTERNAL MEDICINE
Payer: MEDICARE

## 2017-11-16 ENCOUNTER — OFFICE VISIT (OUTPATIENT)
Dept: INTERNAL MEDICINE | Facility: CLINIC | Age: 77
End: 2017-11-16
Payer: MEDICARE

## 2017-11-16 ENCOUNTER — TELEPHONE (OUTPATIENT)
Dept: CARDIOLOGY | Facility: CLINIC | Age: 77
End: 2017-11-16

## 2017-11-16 VITALS
DIASTOLIC BLOOD PRESSURE: 64 MMHG | HEART RATE: 78 BPM | OXYGEN SATURATION: 97 % | BODY MASS INDEX: 28.44 KG/M2 | WEIGHT: 160.5 LBS | SYSTOLIC BLOOD PRESSURE: 128 MMHG | HEIGHT: 63 IN

## 2017-11-16 DIAGNOSIS — D32.9 MENINGIOMA: ICD-10-CM

## 2017-11-16 DIAGNOSIS — G44.89 OTHER HEADACHE SYNDROME: ICD-10-CM

## 2017-11-16 DIAGNOSIS — E11.22 TYPE 2 DIABETES MELLITUS WITH STAGE 3 CHRONIC KIDNEY DISEASE, WITH LONG-TERM CURRENT USE OF INSULIN: ICD-10-CM

## 2017-11-16 DIAGNOSIS — F41.9 ANXIETY: ICD-10-CM

## 2017-11-16 DIAGNOSIS — N18.30 CHRONIC KIDNEY DISEASE, STAGE 3, MOD DECREASED GFR: ICD-10-CM

## 2017-11-16 DIAGNOSIS — I25.10 CORONARY ARTERY DISEASE, ANGINA PRESENCE UNSPECIFIED, UNSPECIFIED VESSEL OR LESION TYPE, UNSPECIFIED WHETHER NATIVE OR TRANSPLANTED HEART: Primary | ICD-10-CM

## 2017-11-16 DIAGNOSIS — I10 ESSENTIAL HYPERTENSION: Primary | ICD-10-CM

## 2017-11-16 DIAGNOSIS — E78.00 PURE HYPERCHOLESTEROLEMIA: ICD-10-CM

## 2017-11-16 DIAGNOSIS — N18.30 TYPE 2 DIABETES MELLITUS WITH STAGE 3 CHRONIC KIDNEY DISEASE, WITH LONG-TERM CURRENT USE OF INSULIN: ICD-10-CM

## 2017-11-16 DIAGNOSIS — Z79.4 TYPE 2 DIABETES MELLITUS WITH STAGE 3 CHRONIC KIDNEY DISEASE, WITH LONG-TERM CURRENT USE OF INSULIN: ICD-10-CM

## 2017-11-16 PROCEDURE — 90662 IIV NO PRSV INCREASED AG IM: CPT | Mod: S$GLB,,, | Performed by: INTERNAL MEDICINE

## 2017-11-16 PROCEDURE — G0008 ADMIN INFLUENZA VIRUS VAC: HCPCS | Mod: S$GLB,,, | Performed by: INTERNAL MEDICINE

## 2017-11-16 PROCEDURE — 99999 PR PBB SHADOW E&M-EST. PATIENT-LVL III: CPT | Mod: PBBFAC,,, | Performed by: INTERNAL MEDICINE

## 2017-11-16 PROCEDURE — 99499 UNLISTED E&M SERVICE: CPT | Mod: S$GLB,,, | Performed by: INTERNAL MEDICINE

## 2017-11-16 PROCEDURE — 70450 CT HEAD/BRAIN W/O DYE: CPT | Mod: TC

## 2017-11-16 PROCEDURE — 99214 OFFICE O/P EST MOD 30 MIN: CPT | Mod: S$GLB,,, | Performed by: INTERNAL MEDICINE

## 2017-11-16 PROCEDURE — 70450 CT HEAD/BRAIN W/O DYE: CPT | Mod: 26,,, | Performed by: RADIOLOGY

## 2017-11-16 RX ORDER — SERTRALINE HYDROCHLORIDE 25 MG/1
25 TABLET, FILM COATED ORAL NIGHTLY
Qty: 30 TABLET | Refills: 1 | Status: SHIPPED | OUTPATIENT
Start: 2017-11-16 | End: 2018-02-19 | Stop reason: SDUPTHER

## 2017-11-16 NOTE — TELEPHONE ENCOUNTER
----- Message from Jose Luis Fernando MD sent at 11/16/2017  4:16 PM CST -----  Tell patient to stop Spironolactone and recheck BMP Monday. Potassium elevated.

## 2017-11-17 ENCOUNTER — PATIENT OUTREACH (OUTPATIENT)
Dept: OTHER | Facility: OTHER | Age: 77
End: 2017-11-17

## 2017-11-17 NOTE — PROGRESS NOTES
"Last 5 Patient Entered Readings Current 30 Day Average: 153/72     Recent Readings 11/27/2017 11/26/2017 11/24/2017 11/22/2017 11/21/2017    Systolic BP (mmHg) 130 124 140 168 143    Diastolic BP (mmHg) 59 56 62 72 66    Pulse 70 78 74 86 75          Ms. Quinones's potassium increased with dose increase of spironolactone. Dr. Fernando has discontinued spironolactone, and potassium has returned to normal. Ms. Quinones's recent BP readings are lower, even after stopping spironolactone. She said she saw Dr. Gaston and she felt that Ms. Quinones was "anxious about checking her BP" so she started sertraline 25 mg QHS. Ms. Quinones says she feels fine since starting sertraline and has noticed an improvement in her BP.     Current HTN regimen:  Hypertension Medications             carvedilol (COREG) 25 MG tablet TAKE 2 TABLETS BY MOUTH TWICE A DAY    chlorthalidone (HYGROTEN) 25 MG Tab Take 1 tablet (25 mg total) by mouth once daily.    hydrALAZINE (APRESOLINE) 100 MG tablet Take 1 tablet (100 mg total) by mouth 3 (three) times daily.    nitroGLYCERIN (NITROSTAT) 0.4 MG SL tablet Place 1 tablet under the tongue continuous prn.      valsartan (DIOVAN) 320 MG tablet Take 1 tablet (320 mg total) by mouth once daily.          Will continue to monitor regularly. Will follow up in 2-3 weeks, sooner if BP begins to trend upward or downward.    Patient has my contact information and knows to call with any concerns or clinical changes.        "

## 2017-11-17 NOTE — PROGRESS NOTES
"Last 5 Patient Entered Readings Current 30 Day Average: 149/68     Recent Readings 12/6/2017 12/3/2017 12/1/2017 11/30/2017 11/29/2017    Systolic BP (mmHg) 138 127 153 161 136    Diastolic BP (mmHg) 72 60 64 68 62    Pulse 76 72 81 76 70        Hypertension Digital Medicine Program (HDMP): Health  Follow Up    Lifestyle Modifications:    1.Low sodium diet: yes States she has been "watching her salt" by reading food labels, not adding salt to her foods, and when she is going out to eat has started to look for low sodium or heart healthy option on the menu.  Praised patient for her ability to do this and discussed how her blood pressure has started to come down since medication change and her effort with her diet. Encouraged her to continue with this and revisited recommendation of <2000mg soduim per day and items that are less than 140mg per serving. Patient verbalizes understanding.     2.Physical activity: no Reports she has not changed anything regarding physical activity, and continues to get most movement from daily tasks.  Encouraged her to continue integrating physical activity as she is able.     3.Hypotension/Hypertension symptoms: yes Reports occasional headaches in the evening.  States they do not concern her and that they do not happen all the time; is unsure of what causes them or makes them better.     4.Patient has been compliant with the medication regimen. Pleased with latest adjustment.     Follow up with Mrs. Myesha Quinones completed. No further questions or concerns. I will follow up in a few weeks to assess progress. Continue reinforcing small positive changes by patient and offering low sodium resources.           "

## 2017-11-20 ENCOUNTER — LAB VISIT (OUTPATIENT)
Dept: LAB | Facility: HOSPITAL | Age: 77
End: 2017-11-20
Attending: INTERNAL MEDICINE
Payer: MEDICARE

## 2017-11-20 DIAGNOSIS — I25.10 CORONARY ARTERY DISEASE, ANGINA PRESENCE UNSPECIFIED, UNSPECIFIED VESSEL OR LESION TYPE, UNSPECIFIED WHETHER NATIVE OR TRANSPLANTED HEART: ICD-10-CM

## 2017-11-20 LAB
ANION GAP SERPL CALC-SCNC: 8 MMOL/L
BUN SERPL-MCNC: 34 MG/DL
CALCIUM SERPL-MCNC: 10 MG/DL
CHLORIDE SERPL-SCNC: 105 MMOL/L
CO2 SERPL-SCNC: 23 MMOL/L
CREAT SERPL-MCNC: 1.3 MG/DL
EST. GFR  (AFRICAN AMERICAN): 45.4 ML/MIN/1.73 M^2
EST. GFR  (NON AFRICAN AMERICAN): 39.4 ML/MIN/1.73 M^2
GLUCOSE SERPL-MCNC: 175 MG/DL
POTASSIUM SERPL-SCNC: 5.1 MMOL/L
SODIUM SERPL-SCNC: 136 MMOL/L

## 2017-11-20 PROCEDURE — 36415 COLL VENOUS BLD VENIPUNCTURE: CPT | Mod: PO

## 2017-11-20 PROCEDURE — 80048 BASIC METABOLIC PNL TOTAL CA: CPT

## 2017-11-22 ENCOUNTER — TELEPHONE (OUTPATIENT)
Dept: CARDIOLOGY | Facility: CLINIC | Age: 77
End: 2017-11-22

## 2017-11-22 NOTE — TELEPHONE ENCOUNTER
----- Message from Jose Luis Fernando MD sent at 11/21/2017  8:48 PM CST -----  Please inform the patient that since the Spironolactone has been discontinued, her potassium has returned to normal range. Blood sugar elevated. Kidney function a little better.

## 2017-11-28 ENCOUNTER — TELEPHONE (OUTPATIENT)
Dept: PULMONOLOGY | Facility: CLINIC | Age: 77
End: 2017-11-28

## 2017-12-07 ENCOUNTER — OFFICE VISIT (OUTPATIENT)
Dept: INTERNAL MEDICINE | Facility: CLINIC | Age: 77
End: 2017-12-07
Payer: MEDICARE

## 2017-12-07 ENCOUNTER — HOSPITAL ENCOUNTER (OUTPATIENT)
Dept: RADIOLOGY | Facility: HOSPITAL | Age: 77
Discharge: HOME | End: 2017-12-07
Attending: INTERNAL MEDICINE
Payer: MEDICARE

## 2017-12-07 VITALS
SYSTOLIC BLOOD PRESSURE: 126 MMHG | WEIGHT: 163.81 LBS | DIASTOLIC BLOOD PRESSURE: 78 MMHG | HEART RATE: 78 BPM | BODY MASS INDEX: 29.02 KG/M2 | HEIGHT: 63 IN | OXYGEN SATURATION: 95 %

## 2017-12-07 DIAGNOSIS — R79.0 LOW MAGNESIUM LEVEL: ICD-10-CM

## 2017-12-07 DIAGNOSIS — R10.32 ABDOMINAL PAIN, LEFT LOWER QUADRANT: ICD-10-CM

## 2017-12-07 DIAGNOSIS — R25.1 TREMOR: ICD-10-CM

## 2017-12-07 DIAGNOSIS — E87.5 SERUM POTASSIUM ELEVATED: ICD-10-CM

## 2017-12-07 DIAGNOSIS — H91.8X3 OTHER SPECIFIED HEARING LOSS OF BOTH EARS: ICD-10-CM

## 2017-12-07 DIAGNOSIS — I10 ESSENTIAL HYPERTENSION: Primary | ICD-10-CM

## 2017-12-07 PROCEDURE — 99999 PR PBB SHADOW E&M-EST. PATIENT-LVL IV: CPT | Mod: PBBFAC,,, | Performed by: INTERNAL MEDICINE

## 2017-12-07 PROCEDURE — 74176 CT ABD & PELVIS W/O CONTRAST: CPT | Mod: 26,,, | Performed by: RADIOLOGY

## 2017-12-07 PROCEDURE — 74176 CT ABD & PELVIS W/O CONTRAST: CPT | Mod: TC

## 2017-12-07 PROCEDURE — 99215 OFFICE O/P EST HI 40 MIN: CPT | Mod: S$GLB,,, | Performed by: INTERNAL MEDICINE

## 2017-12-07 PROCEDURE — 99499 UNLISTED E&M SERVICE: CPT | Mod: S$GLB,,, | Performed by: INTERNAL MEDICINE

## 2017-12-07 NOTE — PROGRESS NOTES
Subjective:      Patient ID: Myesha Quinones is a 77 y.o. female.    Chief Complaint: Follow-up    HPI:  HPI   Hypertension: on digital medicine program , has stopped the aldactone because of elevated K and has also stopped magnesium. She is able to tolerate the sertraline at 12.5 mg for stress     She states that she needs to establish with ENT at [facility] for hearing loss, and she wears hearing aides in both hears.    She has had a tremor for years but is worse, finds it hard to write her name.    LLQ pain for about several months, she is now constipated, her last colonoscopy was in 2014. She does have diverticulosis.      Patient Active Problem List   Diagnosis    History of nonmelanoma skin cancer    Nonischemic cardiomyopathy    LBBB (left bundle branch block)    Chronic systolic heart failure    Nontoxic multinodular goiter    Meningioma    Asthma, chronic    Biventricular ICD (implantable cardioverter-defibrillator) in place    Coronary artery disease involving native coronary artery without angina pectoris - Non-obstructive 50% LAD    Essential hypertension    Hyperlipidemia    History of breast cancer    Adrenal cortical nodule: repeat CT 6/2016    Calcification of aorta    Diabetic polyneuropathy associated with type 2 diabetes mellitus    Osteoporosis    KHOA (obstructive sleep apnea)    Type 2 diabetes mellitus with stage 3 chronic kidney disease, with long-term current use of insulin    Chronic kidney disease, stage 3, mod decreased GFR    Iron deficiency anemia    Thyroid nodule    Chemotherapy-induced neuropathy    Hypomagnesemia     Past Medical History:   Diagnosis Date    Allergy     Asthma     Basal cell carcinoma     left forehead    Basal cell carcinoma     left nose    Basal cell carcinoma 05/20/2015    right nose    Basal cell carcinoma 12/22/2015    left lower post neck    CAD (coronary artery disease)     Cardiomyopathy     Cardiomyopathy, ischemic      Cataract     CHF (congestive heart failure)     Chronic kidney disease, stage 3, mod decreased GFR 2/14/2017    COPD (chronic obstructive pulmonary disease)     Defibrillator discharge     Diabetes mellitus     Diabetes mellitus type II     Diabetes with neurologic complications     Goiter     MNG    HX: breast cancer     Hyperlipidemia     Hypertension     Iron deficiency anemia 5/16/2017    Left kidney mass     Meningioma     Osteoporosis, postmenopausal     Pacemaker     Skin cancer     s/p excision    Sleep apnea     CPAP    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/2015    mid forehead    Unspecified vitamin D deficiency     Ventricular tachycardia     Vitamin B12 deficiency     Vitamin D deficiency disease      Past Surgical History:   Procedure Laterality Date    BASAL CELL CARCINOMA EXCISION      posterior neck and nose    BREAST SURGERY      CARDIAC DEFIBRILLATOR PLACEMENT      x 2    CATARACT EXTRACTION W/  INTRAOCULAR LENS IMPLANT Bilateral     CHOLECYSTECTOMY      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    FRACTURE SURGERY      left elbow and wrist as a child    HYSTERECTOMY      MASTECTOMY      right    SQUAMOUS CELL CARCINOMA EXCISION      remved from forehead    TONSILLECTOMY       Family History   Problem Relation Age of Onset    Diabetes Father     Heart disease Father     Diabetes Sister     Heart disease Sister     Diabetes Brother     Heart disease Brother     Hypertension Brother     Diabetes Brother     Heart disease Brother     Hypertension Brother     Diabetes Brother     Heart disease Brother     Cancer Brother      colon    Diabetes Brother     Cancer Son      skin    Diabetes Son      prediabetes    Diabetes Daughter      prediabetes    Cancer Daughter      melanoma    Obesity Daughter     Diabetes Son     Asthma Mother     Hypertension Mother     Stroke Mother     No Known Problems Maternal Grandmother     No Known Problems Maternal  "Grandfather     No Known Problems Paternal Grandmother     No Known Problems Paternal Grandfather     Amblyopia Neg Hx     Blindness Neg Hx     Cataracts Neg Hx     Glaucoma Neg Hx     Macular degeneration Neg Hx     Retinal detachment Neg Hx     Strabismus Neg Hx     Thyroid disease Neg Hx      Review of Systems   Constitutional: Negative for chills, fever and unexpected weight change.   HENT: Negative for trouble swallowing.    Respiratory: Negative for cough, shortness of breath and wheezing.    Cardiovascular: Negative for chest pain and palpitations.   Gastrointestinal: Negative for abdominal distention, abdominal pain, blood in stool and vomiting.   Musculoskeletal: Negative for back pain.     Objective:     Vitals:    12/07/17 0713   BP: 126/78   Pulse: 78   SpO2: 95%   Weight: 74.3 kg (163 lb 12.8 oz)   Height: 5' 3" (1.6 m)   PainSc:   3     Body mass index is 29.02 kg/m².  Physical Exam   Constitutional: She is oriented to person, place, and time. She appears well-developed and well-nourished. No distress.   Neck: Carotid bruit is not present. No thyromegaly present.   Cardiovascular: Normal rate, regular rhythm and normal heart sounds.  PMI is not displaced.    Pulmonary/Chest: Effort normal and breath sounds normal. No respiratory distress.   Abdominal: Soft. Bowel sounds are normal. She exhibits no distension. There is tenderness.   Musculoskeletal: She exhibits no edema.   Neurological: She is alert and oriented to person, place, and time.     Assessment:     1. Essential hypertension    2. Abdominal pain, left lower quadrant    3. Other specified hearing loss of both ears    4. Tremor    5. Low magnesium level    6. Serum potassium elevated      Plan:   Myesha was seen today for follow-up.    Diagnoses and all orders for this visit:    Essential hypertension: improving and continue on same meds including a SSRI    Abdominal pain, left lower quadrant: uncertain  -     CT Abdomen Pelvis  " Without Contrast; Future  -     Ambulatory consult to Gastroenterology    Other specified hearing loss of both ears: needs to establish at [facility]  -     Audiogram (air & bone); Future  -     Ambulatory consult to ENT    Tremor:has not been evaluated by specialty, would like to keep her on SSRI if possible  -     Ambulatory consult to Neurology    Low magnesium level: off magnesium check level  -     Magnesium; Future    Serum potassium elevated  -     Basic metabolic panel; Future      Return for Follow up appt is made for February.     Medication List          Accurate as of 12/7/17  8:04 AM. If you have any questions, ask your nurse or doctor.               CHANGE how you take these medications    blood sugar diagnostic Strp  Commonly known as:  ACCU-CHEK HECTOR  Uses Accu-Check Hector meter to test BG 4x/day  What changed:  additional instructions     magnesium oxide 400 mg tablet  Commonly known as:  MAG-OX  Take 1 tablet (400 mg total) by mouth 2 (two) times daily.  What changed:  additional instructions     sertraline 25 MG tablet  Commonly known as:  ZOLOFT  Take 1 tablet (25 mg total) by mouth every evening.  What changed:  how much to take        CONTINUE taking these medications    albuterol 2.5 mg /3 mL (0.083 %) nebulizer solution  Commonly known as:  PROVENTIL  Take 3 mLs (2.5 mg total) by nebulization every 6 (six) hours as needed for Wheezing.     azelastine 137 mcg (0.1 %) nasal spray  Commonly known as:  ASTELIN  1 spray (137 mcg total) by Nasal route 2 (two) times daily. For severe allergy     B-12 DOTS ORAL     blood-glucose meter kit  Dispense Accu-Chek Hector meter. Use as instructed     carvedilol 25 MG tablet  Commonly known as:  COREG  TAKE 2 TABLETS BY MOUTH TWICE A DAY     chlorthalidone 25 MG Tab  Commonly known as:  HYGROTEN  Take 1 tablet (25 mg total) by mouth once daily.     ENTERIC COATED ASPIRIN 81 MG EC tablet  Generic drug:  aspirin     FISH  mg Cap  Generic drug:  omega-3  "fatty acids     fluticasone-salmeterol 250-50 mcg/dose 250-50 mcg/dose diskus inhaler  Commonly known as:  ADVAIR  Inhale 1 puff into the lungs 2 (two) times daily. This is a change from one month     glimepiride 2 MG tablet  Commonly known as:  AMARYL  TAKE 1 TABLET BY MOUTH daily     hydrALAZINE 100 MG tablet  Commonly known as:  APRESOLINE  Take 1 tablet (100 mg total) by mouth 3 (three) times daily.     insulin glargine 100 unit/mL (3 mL) Inpn pen  Commonly known as:  LANTUS SOLOSTAR  INJECT 40 UNITS INTO THE SKIN EVERY EVENING.     lancets Misc  Commonly known as:  ACCU-CHEK SOFTCLIX LANCETS  Uses Accu-Chek Angelica meter to test BG 4x/day     linagliptin 5 mg Tab tablet  Commonly known as:  TRADJENTA  Take 1 tablet (5 mg total) by mouth once daily.     metFORMIN 500 MG tablet  Commonly known as:  GLUCOPHAGE  Take 2 tablets (1,000 mg total) by mouth 2 (two) times daily with meals.     nitroGLYCERIN 0.4 MG SL tablet  Commonly known as:  NITROSTAT     * pen needle, diabetic 31 gauge x 3/16" Ndle  Commonly known as:  BD INSULIN PEN NEEDLE UF MINI  USE WITH LANTUS AT BEDTIME     * BD INSULIN PEN NEEDLE UF MINI 31 gauge x 3/16" Ndle  Generic drug:  pen needle, diabetic  USE WITH LANTUS AT BEDTIME     simvastatin 20 MG tablet  Commonly known as:  ZOCOR  TAKE 1 TABLET (20 MG TOTAL) BY MOUTH EVERY EVENING.     valsartan 320 MG tablet  Commonly known as:  DIOVAN  Take 1 tablet (320 mg total) by mouth once daily.        * This list has 2 medication(s) that are the same as other medications prescribed for you. Read the directions carefully, and ask your doctor or other care provider to review them with you.                "

## 2017-12-08 ENCOUNTER — TELEPHONE (OUTPATIENT)
Dept: INTERNAL MEDICINE | Facility: CLINIC | Age: 77
End: 2017-12-08

## 2017-12-08 ENCOUNTER — OFFICE VISIT (OUTPATIENT)
Dept: NEUROLOGY | Facility: CLINIC | Age: 77
End: 2017-12-08
Payer: MEDICARE

## 2017-12-08 VITALS
HEIGHT: 63 IN | WEIGHT: 165.38 LBS | DIASTOLIC BLOOD PRESSURE: 72 MMHG | BODY MASS INDEX: 29.3 KG/M2 | SYSTOLIC BLOOD PRESSURE: 158 MMHG | HEART RATE: 88 BPM

## 2017-12-08 DIAGNOSIS — G25.0 ESSENTIAL TREMOR: Primary | ICD-10-CM

## 2017-12-08 PROCEDURE — 99999 PR PBB SHADOW E&M-EST. PATIENT-LVL III: CPT | Mod: PBBFAC,,, | Performed by: PSYCHIATRY & NEUROLOGY

## 2017-12-08 PROCEDURE — 99499 UNLISTED E&M SERVICE: CPT | Mod: S$GLB,,, | Performed by: PSYCHIATRY & NEUROLOGY

## 2017-12-08 PROCEDURE — 99205 OFFICE O/P NEW HI 60 MIN: CPT | Mod: S$GLB,,, | Performed by: PSYCHIATRY & NEUROLOGY

## 2017-12-08 RX ORDER — PRIMIDONE 50 MG/1
TABLET ORAL
Qty: 60 TABLET | Refills: 11 | Status: SHIPPED | OUTPATIENT
Start: 2017-12-08 | End: 2018-05-31

## 2017-12-08 RX ORDER — LANOLIN ALCOHOL/MO/W.PET/CERES
400 CREAM (GRAM) TOPICAL DAILY
Refills: 0
Start: 2017-12-08 | End: 2023-06-06

## 2017-12-08 NOTE — TELEPHONE ENCOUNTER
Please tell the patient the following:    There was nothing on the CT that would explain her discomfort.. I see her GI appt is not until Feb. She could call for cancels or if her discomfort worsens please see me.    Also please tell her that her magnesium is slightly low. Please  over the counter magnesium 400 g and take one per day.      ( Also for documentation purposes patient was given 10 insulin syringes per insulin program yesterday 12/7/2017.

## 2017-12-08 NOTE — PATIENT INSTRUCTIONS
Primidone 50 mg titration    Week 1: Take 1/2 tab in PM  Week 2: Take 1/2 tab in AM and 1/2 tab in PM  Week 3: Take 1/2 tab in AM and 1 tab in PM  Week 4+:Take 1 tab in AM and 1 tab in PM    If adequately improved, can stop and maintain at any level of the titration.  Call me with an update anytime.    Leonidas Talley MD, MPH  Division of Movement and Memory Disorders  [facility]

## 2017-12-08 NOTE — LETTER
December 8, 2017      Emelina Gaston MD  [address]  [facility] [address]           [facility]  [address]  [facility] [address]  Phone: [phone]  Fax: [phone]          Patient: Myesha Quinones   MR Number: [id number]   YOB: 1940   Date of Visit: 12/8/2017       Dear Dr. Emelina Gaston:    Thank you for referring Myesha Quinones to me for evaluation. Attached you will find relevant portions of my assessment and plan of care.    If you have questions, please do not hesitate to call me. I look forward to following Myesha Quinones along with you.    Sincerely,    Leonidas Talley MD    Enclosure  CC:  No Recipients    If you would like to receive this communication electronically, please contact [email or web address] or [phone] to request more information on [facility] access.    For providers and/or their staff who would like to refer a patient to [facility], please contact us through our one-stop-shop provider referral line, [facility], at [phone].    If you feel you have received this communication in error or would no longer like to receive these types of communications, please e-mail [email or web address]

## 2017-12-08 NOTE — PROGRESS NOTES
"Name: Myesha Quinones  MRN: [id number]   CSN: [id number]      Date: 12/08/2017    Referring physician:  Emelina Gaston MD  [address] [place] [address]    Chief Complaint / Interval History: Follow-up      History of Present Illness (HPI):  77 years old    10+ years of tremors, gradually worse, now top the point of not being able to use them well.  Cannot hold things without it shaking.  Handwriting is poor.  Trouble with soup.  Cannot put on mascara.      Might get worse with nervousness, "worries about everything".  Has some SN hearing loss.    Compounded by numbness in the thumb and two forefingers on both sides, gets worse at night.  Shakes it out in the AM.    + asthma/CHF  Never had a kidney stone    3 kids, 7 grands, 5 great-grands  + FH with tremor in her brother and mother.  Not much of a drinker.      Nonmotor/Premotor ROS:  Hyposmia (HENT)?No  RBD/sleep issues (Constitutional)?Yes - has KHOA on CPAP.  Rare dreams.  No sleep walkinr or talking  Depression/anxiety (Psychiatric)?Yes - worry  Fatigue (Constitutional)?Yes  Constipation (GI)?No in the past, but some lately  Urinary issues ()?No  Sexual dysfunction ()?N/A  Orthostasis (Cardiovascular)?No  Leg swelling (Cardiovascular)? No  Falls (Musculoskeletal)?No  Cognitive impairment (Neurologic)?No  Psychoses (Psychiatric)?No  Pain/Paresthesia (Neurologic)? CTS sxs as described  Visual changes (Eyes)?No  Moles / skin changes (Skin)?No  Stridor / SOB (Pulm)?No stridor, but SOB and wheezing from CHF and asthma  Bruising (Heme)?No    Past Medical History: The patient  has a past medical history of Allergy; Asthma; Basal cell carcinoma; Basal cell carcinoma; Basal cell carcinoma (05/20/2015); Basal cell carcinoma (12/22/2015); CAD (coronary artery disease); Cardiomyopathy; Cardiomyopathy, ischemic; Cataract; CHF (congestive heart failure); Chronic kidney disease, stage 3, mod decreased GFR (2/14/2017); COPD (chronic obstructive pulmonary " "disease); Defibrillator discharge; Diabetes mellitus; Diabetes mellitus type II; Diabetes with neurologic complications; Goiter; breast cancer; Hyperlipidemia; Hypertension; Iron deficiency anemia (5/16/2017); Left kidney mass; Meningioma; Osteoporosis, postmenopausal; Pacemaker; Skin cancer; Sleep apnea; Squamous cell carcinoma (12/03/2015); Unspecified vitamin D deficiency; Ventricular tachycardia; Vitamin B12 deficiency; and Vitamin D deficiency disease.    Social History: The patient  reports that she has never smoked. She has never used smokeless tobacco. She reports that she does not drink alcohol or use drugs.    Family History: Their family history includes Asthma in her mother; Cancer in her brother, daughter, and son; Diabetes in her brother, brother, brother, brother, daughter, father, sister, son, and son; Heart disease in her brother, brother, brother, father, and sister; Hypertension in her brother, brother, and mother; No Known Problems in her maternal grandfather, maternal grandmother, paternal grandfather, and paternal grandmother; Obesity in her daughter; Stroke in her mother.    Allergies: Iodine and iodide containing products     Meds:   Current Outpatient Prescriptions on File Prior to Visit   Medication Sig Dispense Refill    aspirin (ENTERIC COATED ASPIRIN) 81 MG EC tablet Take 1 tablet by mouth.      BD INSULIN PEN NEEDLE UF MINI 31 gauge x 3/16" Ndle USE WITH LANTUS AT BEDTIME 100 each 3    blood sugar diagnostic (ACCU-CHEK HECTOR) Strp Uses Accu-Check Hector meter to test BG 4x/day (Patient taking differently: Uses Accu-Check Hector meter to test BG 4x/day checking 2x day) 400 strip 3    blood-glucose meter kit Dispense Accu-Chek Hector meter. Use as instructed 1 each 0    carvedilol (COREG) 25 MG tablet TAKE 2 TABLETS BY MOUTH TWICE A  tablet 3    chlorthalidone (HYGROTEN) 25 MG Tab Take 1 tablet (25 mg total) by mouth once daily. 90 tablet 0    CYANOCOBALAMIN, VITAMIN B-12, " "(B-12 DOTS ORAL) Take 1 tablet by mouth once daily.      fluticasone-salmeterol 250-50 mcg/dose (ADVAIR) 250-50 mcg/dose diskus inhaler Inhale 1 puff into the lungs 2 (two) times daily. This is a change from one month 180 each 3    glimepiride (AMARYL) 2 MG tablet TAKE 1 TABLET BY MOUTH daily 180 tablet 3    hydrALAZINE (APRESOLINE) 100 MG tablet Take 1 tablet (100 mg total) by mouth 3 (three) times daily. 90 tablet 11    insulin glargine (LANTUS SOLOSTAR) 100 unit/mL (3 mL) InPn pen INJECT 40 UNITS INTO THE SKIN EVERY EVENING. 3 Box 3    lancets (ACCU-CHEK SOFTCLIX LANCETS) Misc Uses Accu-Chek Angelica meter to test BG 4x/day 400 each 3    linagliptin (TRADJENTA) 5 mg Tab tablet Take 1 tablet (5 mg total) by mouth once daily. 90 tablet 3    metformin (GLUCOPHAGE) 500 MG tablet Take 2 tablets (1,000 mg total) by mouth 2 (two) times daily with meals. 180 tablet 3    omega-3 fatty acids (FISH OIL) 500 mg Cap Take 1 capsule by mouth Twice daily.      pen needle, diabetic (BD INSULIN PEN NEEDLE UF MINI) 31 gauge x 3/16" Ndle USE WITH LANTUS AT BEDTIME 100 each 3    sertraline (ZOLOFT) 25 MG tablet Take 1 tablet (25 mg total) by mouth every evening. (Patient taking differently: Take 12.5 mg by mouth every evening. ) 30 tablet 1    simvastatin (ZOCOR) 20 MG tablet TAKE 1 TABLET (20 MG TOTAL) BY MOUTH EVERY EVENING. 90 tablet 3    valsartan (DIOVAN) 320 MG tablet Take 1 tablet (320 mg total) by mouth once daily. 90 tablet 3    albuterol (PROVENTIL) 2.5 mg /3 mL (0.083 %) nebulizer solution Take 3 mLs (2.5 mg total) by nebulization every 6 (six) hours as needed for Wheezing. 1 Box 12    azelastine (ASTELIN) 137 mcg (0.1 %) nasal spray 1 spray (137 mcg total) by Nasal route 2 (two) times daily. For severe allergy 30 mL 1    nitroGLYCERIN (NITROSTAT) 0.4 MG SL tablet Place 1 tablet under the tongue continuous prn.        [DISCONTINUED] magnesium oxide (MAG-OX) 400 mg tablet Take 1 tablet (400 mg total) by mouth 2 " "(two) times daily. (Patient taking differently: Take 400 mg by mouth 2 (two) times daily. Patient will stop) 180 tablet 3     No current facility-administered medications on file prior to visit.        Exam:  BP (!) 158/72   Pulse 88   Ht 5' 3" (1.6 m)   Wt 75 kg (165 lb 5.5 oz)   BMI 29.29 kg/m²     Constitutional  Well-developed, well-nourished, appears stated age   Ophthalmoscopic  No papilledema with no hemorrhages or exudates bilaterally   Cardiovascular  Radial pulses 2+ and symmetric, no LE edema bilaterally   Neurological    * Mental status  MOCA deferred     - Orientation  Oriented to person, place, time, and situation     - Memory   Intact recent and remote     - Attention/concentration  Attentive, vigilant during exam     - Language  Naming & repetition intact, +2-step commands     - Fund of knowledge  Aware of current events     - Executive  Well-organized thoughts     - Other     * Cranial nerves       - CN II  PERRL, visual fields full to confrontation     - CN III, IV, VI  Extraocular movements full, normal pursuits and saccades     - CN V  Sensation V1 - V3 intact     - CN VII  Face strong and symmetric bilaterally     - CN VIII  Hearing intact bilaterally     - CN IX, X  Palate raises midline and symmetric     - CN XI  SCM and trapezius 5/5 bilaterally     - CN XII  Tongue midline   * Motor  Muscle bulk normal, strength 5/5 throughout   * Sensory   Intact to temperature and vibration throughout   * Coordination  No dysmetria with finger-to-nose or heel-to-shin   * Gait  See below.   * Deep tendon reflexes  2+ and symmetric throughout   Babinski downgoing bilaterally   * Specialized movement exam  No hypophonic speech.    No facial masking.   No cogwheel rigidity.     No bradykinesia.   +L>R tremor with posture, kinesis.  None with rest or intention.    No other dystonia, chorea, athetosis, myoclonus, or tics.   No motor impersistence.   Normal-based gait.   No shortened stride length.   SL dim " arm swing on the R, no injury previously.     No postural instability.      Laboratory/Radiological:  - Results:  Lab Visit on 12/07/2017   Component Date Value Ref Range Status    Sodium 12/07/2017 138  136 - 145 mmol/L Final    Potassium 12/07/2017 4.6  3.5 - 5.1 mmol/L Final    Chloride 12/07/2017 106  95 - 110 mmol/L Final    CO2 12/07/2017 23  23 - 29 mmol/L Final    Glucose 12/07/2017 118* 70 - 110 mg/dL Final    BUN, Bld 12/07/2017 28* 8 - 23 mg/dL Final    Creatinine 12/07/2017 1.2  0.5 - 1.4 mg/dL Final    Calcium 12/07/2017 9.6  8.7 - 10.5 mg/dL Final    Anion Gap 12/07/2017 9  8 - 16 mmol/L Final    eGFR if  12/07/2017 50* >60 mL/min/1.73 m^2 Final    eGFR if non  12/07/2017 43* >60 mL/min/1.73 m^2 Final    Magnesium 12/07/2017 1.5* 1.6 - 2.6 mg/dL Final   Lab Visit on 11/20/2017   Component Date Value Ref Range Status    Sodium 11/20/2017 136  136 - 145 mmol/L Final    Potassium 11/20/2017 5.1  3.5 - 5.1 mmol/L Final    Chloride 11/20/2017 105  95 - 110 mmol/L Final    CO2 11/20/2017 23  23 - 29 mmol/L Final    Glucose 11/20/2017 175* 70 - 110 mg/dL Final    BUN, Bld 11/20/2017 34* 8 - 23 mg/dL Final    Creatinine 11/20/2017 1.3  0.5 - 1.4 mg/dL Final    Calcium 11/20/2017 10.0  8.7 - 10.5 mg/dL Final    Anion Gap 11/20/2017 8  8 - 16 mmol/L Final    eGFR if  11/20/2017 45.4* >60 mL/min/1.73 m^2 Final    eGFR if non  11/20/2017 39.4* >60 mL/min/1.73 m^2 Final   Lab Visit on 11/16/2017   Component Date Value Ref Range Status    Magnesium 11/16/2017 1.9  1.6 - 2.6 mg/dL Final    Sodium 11/16/2017 134* 136 - 145 mmol/L Final    Potassium 11/16/2017 5.4* 3.5 - 5.1 mmol/L Final    Chloride 11/16/2017 102  95 - 110 mmol/L Final    CO2 11/16/2017 22* 23 - 29 mmol/L Final    Glucose 11/16/2017 213* 70 - 110 mg/dL Final    BUN, Bld 11/16/2017 35* 8 - 23 mg/dL Final    Creatinine 11/16/2017 1.5* 0.5 - 1.4 mg/dL Final     Calcium 11/16/2017 10.0  8.7 - 10.5 mg/dL Final    Anion Gap 11/16/2017 10  8 - 16 mmol/L Final    eGFR if  11/16/2017 38* >60 mL/min/1.73 m^2 Final    eGFR if non  11/16/2017 33* >60 mL/min/1.73 m^2 Final    Hemoglobin A1C 11/16/2017 7.2* 4.0 - 5.6 % Final    Estimated Avg Glucose 11/16/2017 160* 68 - 131 mg/dL Final   Lab Visit on 10/31/2017   Component Date Value Ref Range Status    Sodium 10/31/2017 139  136 - 145 mmol/L Final    Potassium 10/31/2017 4.9  3.5 - 5.1 mmol/L Final    Chloride 10/31/2017 103  95 - 110 mmol/L Final    CO2 10/31/2017 24  23 - 29 mmol/L Final    Glucose 10/31/2017 133* 70 - 110 mg/dL Final    BUN, Bld 10/31/2017 27* 8 - 23 mg/dL Final    Creatinine 10/31/2017 1.2  0.5 - 1.4 mg/dL Final    Calcium 10/31/2017 9.8  8.7 - 10.5 mg/dL Final    Anion Gap 10/31/2017 12  8 - 16 mmol/L Final    eGFR if  10/31/2017 50.0* >60 mL/min/1.73 m^2 Final    eGFR if non African American 10/31/2017 43.4* >60 mL/min/1.73 m^2 Final    Magnesium 10/31/2017 1.4* 1.6 - 2.6 mg/dL Final   Lab Visit on 10/05/2017   Component Date Value Ref Range Status    Hemoglobin A1C 10/05/2017 7.6* 4.0 - 5.6 % Final    Estimated Avg Glucose 10/05/2017 171* 68 - 131 mg/dL Final     - Independent review of images:  None    Reviewed normal thyroid and lfts.    Diagnoses:          1) Essential tremor.  No convincing s/s of parkinsonism (she is reassured).    Medical Decision Making:    Primidone 50 mg titration    Week 1: Take 1/2 tab in PM  Week 2: Take 1/2 tab in AM and 1/2 tab in PM  Week 3: Take 1/2 tab in AM and 1 tab in PM  Week 4: Take 1 tab in AM and 1 tab in PM    If adequately improved, can stop and maintain at any level of the titration.  Call me with an update anytime.      Leonidas Talley MD, MPH  Division of Movement and Memory Disorders  [facility]  [phone]

## 2017-12-08 NOTE — TELEPHONE ENCOUNTER
Spoke with pt. advised of CT results and magnesium level. She states she will start taking supplement tomorrow. She also said she is on wait list with GI in case of a cancel and will call if she gets worse,

## 2017-12-14 ENCOUNTER — TELEPHONE (OUTPATIENT)
Dept: OTOLARYNGOLOGY | Facility: CLINIC | Age: 77
End: 2017-12-14

## 2017-12-18 ENCOUNTER — OFFICE VISIT (OUTPATIENT)
Dept: DERMATOLOGY | Facility: CLINIC | Age: 77
End: 2017-12-18
Payer: MEDICARE

## 2017-12-18 DIAGNOSIS — L82.1 SK (SEBORRHEIC KERATOSIS): ICD-10-CM

## 2017-12-18 DIAGNOSIS — Z85.828 HISTORY OF NONMELANOMA SKIN CANCER: ICD-10-CM

## 2017-12-18 DIAGNOSIS — L57.0 AK (ACTINIC KERATOSIS): Primary | ICD-10-CM

## 2017-12-18 DIAGNOSIS — L81.4 LENTIGO: ICD-10-CM

## 2017-12-18 PROCEDURE — 99999 PR PBB SHADOW E&M-EST. PATIENT-LVL II: CPT | Mod: PBBFAC,,, | Performed by: DERMATOLOGY

## 2017-12-18 PROCEDURE — 99213 OFFICE O/P EST LOW 20 MIN: CPT | Mod: 25,S$GLB,, | Performed by: DERMATOLOGY

## 2017-12-18 PROCEDURE — 17003 DESTRUCT PREMALG LES 2-14: CPT | Mod: S$GLB,,, | Performed by: DERMATOLOGY

## 2017-12-18 PROCEDURE — 17000 DESTRUCT PREMALG LESION: CPT | Mod: S$GLB,,, | Performed by: DERMATOLOGY

## 2017-12-18 NOTE — PATIENT INSTRUCTIONS

## 2017-12-18 NOTE — PROGRESS NOTES
Subjective:       Patient ID:  Myesha Quinones is a 77 y.o. female who presents for   Chief Complaint   Patient presents with    Skin Check     UBSE    Lesion     left cheek, left forehead and chest     History of Present Illness: The patient presents for follow up of skin check.    The patient was last seen on: 7/16 for cryosurgery to actinic keratoses which have resolved and bx of sk left cheek - resolved  This is a high risk patient here to check for the development of new lesions.      Other skin complaints: lesion on chest x 1 year + treated with cryo in past.   C/o lesion on left cheek + scaly x 1 year with neosporin          Review of Systems   Skin: Positive for activity-related sunscreen use. Negative for daily sunscreen use and recent sunburn.   Hematologic/Lymphatic: Bruises/bleeds easily (has defibrillator. takes asa).        Objective:    Physical Exam   Constitutional: She appears well-developed and well-nourished. No distress.   Neurological: She is alert and oriented to person, place, and time. She is not disoriented.   Psychiatric: She has a normal mood and affect.   Skin:   Areas Examined (abnormalities noted in diagram):   Head / Face Inspection Performed  Neck Inspection Performed  Chest / Axilla Inspection Performed  Back Inspection Performed  RUE Inspected  LUE Inspection Performed                   Diagram Legend     Erythematous scaling macule/papule c/w actinic keratosis       Vascular papule c/w angioma      Pigmented verrucoid papule/plaque c/w seborrheic keratosis      Yellow umbilicated papule c/w sebaceous hyperplasia      Irregularly shaped tan macule c/w lentigo     1-2 mm smooth white papules consistent with Milia      Movable subcutaneous cyst with punctum c/w epidermal inclusion cyst      Subcutaneous movable cyst c/w pilar cyst      Firm pink to brown papule c/w dermatofibroma      Pedunculated fleshy papule(s) c/w skin tag(s)      Evenly pigmented macule c/w junctional  nevus     Mildly variegated pigmented, slightly irregular-bordered macule c/w mildly atypical nevus      Flesh colored to evenly pigmented papule c/w intradermal nevus       Pink pearly papule/plaque c/w basal cell carcinoma      Erythematous hyperkeratotic cursted plaque c/w SCC      Surgical scar with no sign of skin cancer recurrence      Open and closed comedones      Inflammatory papules and pustules      Verrucoid papule consistent consistent with wart     Erythematous eczematous patches and plaques     Dystrophic onycholytic nail with subungual debris c/w onychomycosis     Umbilicated papule    Erythematous-base heme-crusted tan verrucoid plaque consistent with inflamed seborrheic keratosis     Erythematous Silvery Scaling Plaque c/w Psoriasis     See annotation      Assessment / Plan:        AK (actinic keratosis)  Cryosurgery Procedure Note    Verbal consent from the patient is obtained and the patient is aware of the precancerous quality and need for treatment of these lesions. Liquid nitrogen cryosurgery is applied to the 3 actinic keratoses, as detailed in the physical exam, to produce a freeze injury. The patient is aware that blisters may form and is instructed on wound care with gentle cleansing and use of vaseline ointment to keep moist until healed. The patient is supplied a handout on cryosurgery and is instructed to call if lesions do not completely resolve.      SK (seborrheic keratosis)  These are benign inherited growths without a malignant potential. Reassurance given to patient. No treatment is necessary.       Lentigo  This is a benign hyperpigmented sun induced lesion. Daily sun protection will reduce the number of new lesions. Treatment of these benign lesions are considered cosmetic.      History of nonmelanoma skin cancer  Area(s) of previous NMSC evaluated with no signs of recurrence.    Upper body skin examination performed today including at least 6 points as noted in physical  examination. No lesions suspicious for malignancy noted.               Return in about 1 year (around 12/18/2018).

## 2018-01-03 DIAGNOSIS — E11.42 DIABETIC SENSORIMOTOR NEUROPATHY: ICD-10-CM

## 2018-01-03 RX ORDER — METFORMIN HYDROCHLORIDE 500 MG/1
1000 TABLET ORAL 2 TIMES DAILY WITH MEALS
Qty: 360 TABLET | Refills: 3 | Status: SHIPPED | OUTPATIENT
Start: 2018-01-03 | End: 2018-07-16 | Stop reason: SDUPTHER

## 2018-01-04 ENCOUNTER — PATIENT OUTREACH (OUTPATIENT)
Dept: OTHER | Facility: OTHER | Age: 78
End: 2018-01-04

## 2018-01-04 NOTE — PROGRESS NOTES
"Last 5 Patient Entered Readings Current 30 Day Average: 152/71     Recent Readings 1/3/2018 12/31/2017 12/30/2017 12/30/2017 12/30/2017    SBP (mmHg) 148 144 153 167 164    DBP (mmHg) 68 69 87 86 75    Pulse 78 76 81 86 84          Hypertension Digital Medicine Program (HDMP): Health  Follow Up    Lifestyle Modifications:    1.Low sodium diet: no Reports she has not been as adherent to low sodium diet with holidays, but that over the last few days she has been making an increased effort to chose low sodium or healthier options. States she noticed her blood pressure was elevated and feels its from a combination of stress, diet, and potentially new medications.  Expresses concern that new medications from neurologist and PCP may be elevating bp and would like to discuss with with Miguel Armstrong at follow up call to be sure.     2.Physical activity: no Per patient, no changes in physical activity level due to cold and asthma.  Encouraged activity around the house and reinforced importance of regular movement on blood pressure and overall health.     3.Hypotension/Hypertension symptoms: yes States that with introduction of medication from neurologist, she is noticing she feels "woozy" sometimes.  Reports that she believes its from that medication, as it's listed as a side effect, but is unsure.  Again, would like to review medication regimen at follow up to ensure new prescriptions are ok for blood pressure.     4.Patient has been compliant with the medication regimen.     Follow up with Mrs. Myesha Jonesplet completed. No further questions or concerns. I will follow up in a few weeks to assess progress.       "

## 2018-01-11 ENCOUNTER — CLINICAL SUPPORT (OUTPATIENT)
Dept: OTOLARYNGOLOGY | Facility: CLINIC | Age: 78
End: 2018-01-11
Payer: MEDICARE

## 2018-01-11 ENCOUNTER — OFFICE VISIT (OUTPATIENT)
Dept: OTOLARYNGOLOGY | Facility: CLINIC | Age: 78
End: 2018-01-11
Payer: MEDICARE

## 2018-01-11 ENCOUNTER — TELEPHONE (OUTPATIENT)
Dept: OTOLARYNGOLOGY | Facility: CLINIC | Age: 78
End: 2018-01-11

## 2018-01-11 VITALS
HEIGHT: 63 IN | DIASTOLIC BLOOD PRESSURE: 68 MMHG | TEMPERATURE: 97 F | WEIGHT: 168.63 LBS | HEART RATE: 88 BPM | SYSTOLIC BLOOD PRESSURE: 150 MMHG | BODY MASS INDEX: 29.88 KG/M2

## 2018-01-11 DIAGNOSIS — H93.13 TINNITUS AURIUM, BILATERAL: ICD-10-CM

## 2018-01-11 DIAGNOSIS — H90.A32 MIXED CONDUCTIVE AND SENSORINEURAL HEARING LOSS OF LEFT EAR WITH RESTRICTED HEARING OF RIGHT EAR: Primary | ICD-10-CM

## 2018-01-11 DIAGNOSIS — H69.92 ETD (EUSTACHIAN TUBE DYSFUNCTION), LEFT: ICD-10-CM

## 2018-01-11 DIAGNOSIS — H90.A21 SENSORINEURAL HEARING LOSS (SNHL) OF RIGHT EAR WITH RESTRICTED HEARING OF LEFT EAR: ICD-10-CM

## 2018-01-11 DIAGNOSIS — H66.3X2 CHRONIC SUPPURATIVE OTITIS MEDIA OF LEFT EAR, UNSPECIFIED OTITIS MEDIA LOCATION: ICD-10-CM

## 2018-01-11 DIAGNOSIS — H90.A12 CONDUCTIVE HEARING LOSS OF LEFT EAR WITH RESTRICTED HEARING OF RIGHT EAR: Primary | ICD-10-CM

## 2018-01-11 PROCEDURE — 99999 PR PBB SHADOW E&M-EST. PATIENT-LVL V: CPT | Mod: PBBFAC,,, | Performed by: OTOLARYNGOLOGY

## 2018-01-11 PROCEDURE — 92567 TYMPANOMETRY: CPT | Mod: S$GLB,,, | Performed by: AUDIOLOGIST-HEARING AID FITTER

## 2018-01-11 PROCEDURE — 99999 PR PBB SHADOW E&M-EST. PATIENT-LVL I: CPT | Mod: PBBFAC,,, | Performed by: AUDIOLOGIST-HEARING AID FITTER

## 2018-01-11 PROCEDURE — 99204 OFFICE O/P NEW MOD 45 MIN: CPT | Mod: 25,S$GLB,, | Performed by: OTOLARYNGOLOGY

## 2018-01-11 PROCEDURE — 92557 COMPREHENSIVE HEARING TEST: CPT | Mod: S$GLB,,, | Performed by: AUDIOLOGIST-HEARING AID FITTER

## 2018-01-11 RX ORDER — AMOXICILLIN AND CLAVULANATE POTASSIUM 875; 125 MG/1; MG/1
1 TABLET, FILM COATED ORAL 2 TIMES DAILY
Qty: 20 TABLET | Refills: 0 | Status: SHIPPED | OUTPATIENT
Start: 2018-01-11 | End: 2018-01-21

## 2018-01-11 NOTE — TELEPHONE ENCOUNTER
Spoke with patient she was notified scheduled her and Mr Camarena an appointment for today at 1:40.

## 2018-01-11 NOTE — PROCEDURES
Procedures     Binocular Microscopy    Indication:  Middle ear effusion    Additional detail was required for the otoscopic examination of the left ear.  The operating microscope was used to directly visualize the tympanic membrane and middle ear landmarks.  Findings:  Canal skin:  normal   TM:  thickened   Ossicles:  unable to visualize well    Effusion:  mucoid effusion present.     The patient tolerated the procedure well.

## 2018-01-11 NOTE — PROGRESS NOTES
Danis Vazquez, F-AAA  [facility] [address]  [address]  [place] [address]      Patient: Myesha Quinones   MRN: [id number]   : 1940  PELAYO: 2018    AUDIOLOGICAL EVALUATION    CASE HISTORY:    Myesha Quinones, 77 y.o., was seen on the above date for an audiological evaluation. Patient reported hearing loss in both ears with her left ear being worse. She currently wears binaural BTE hearing aids. She reported physical trauma to her left ear as a child when hot coffee spilt on her ear causing damage to her TM. No further history significant to hearing loss was reported.    TEST RESULTS:   Otoscopy was unremarkable for both ears. Imittance testing revealed normal middle ear compliance (Type A) in her right ear and stiff middle ear compliance (Type B) with normal volume measurements in her left ear, which is consistent with a middle ear pathology. Speech reception thresholds were obtained at 55 dB HL in her right ear. There was no response at 90 dB in her left ear. A word recognition score of 72% was obtained in the right ear using a presentation level of 85 dBHL (MCL).     IMPRESSION:   Audiological testing indicated that Myesha Quinones has a moderate to profound sensorineural hearing loss in her right ear and a severe to profound conductive hearing loss in her left ear.    RECOMMENDATIONS:   It is recommended that she:  Follow-up with physician to assess suspected middle ear pathology.  Hearing test following otologic intervention per physician's request.  Continue to follow up annually for hearing tests to monitor hearing status.     If you should have any questions or concerns regarding the above information, please do not hesitate to contact me at [phone].      _______________________________  Susannah Vazquez, AAA  Clinical Audiologist    enclosure: audiogram

## 2018-01-11 NOTE — PROGRESS NOTES
"  Chief Complaint   Patient presents with    Hearing Loss     bilateral   .     HPI:  Myesha Quinones is a very pleasant 77 y.o. female here to see me today for the first time for evaluation of hearing loss.  She states that she has had lifelong ear problems.  She reports that as a child she pulled a cup of scalding hot coffee on and inside her left ear which resulted in an eardrum perforation. She states ever since this incident she "has never had normal hearing or ear."  She relays that in 1998 she had breast cancer and had hearing loss associated with the chemotherapy and obtained her first set of hearing aids in 1999.  She is here today to establish with ENT as her prior ENT Dr. Tanner no longer takes her insurance. She was referred for evaluation of her ears and for to evaluate for cerumen removal as her hearing aids have not been working as well recently.  She reports hearing loss that has been gradually progressing over the last several years.  She has not had any recent issues with ear pain or ear drainage.  She denies a family history of hearing loss, and has not had any previous otologic surgery.  She denies any history of significant loud noise exposure.She denies issues with dizziness.        Past Medical History:   Diagnosis Date    Allergy     Asthma     Basal cell carcinoma     left forehead    Basal cell carcinoma     left nose    Basal cell carcinoma 05/20/2015    right nose    Basal cell carcinoma 12/22/2015    left lower post neck    CAD (coronary artery disease)     Cardiomyopathy     Cardiomyopathy, ischemic     Cataract     CHF (congestive heart failure)     Chronic kidney disease, stage 3, mod decreased GFR 2/14/2017    COPD (chronic obstructive pulmonary disease)     Defibrillator discharge     Diabetes mellitus     Diabetes mellitus type II     Diabetes with neurologic complications     Goiter     MNG    HX: breast cancer     Hyperlipidemia     Hypertension     " Iron deficiency anemia 5/16/2017    Left kidney mass     Meningioma     Osteoporosis, postmenopausal     Pacemaker     Skin cancer     s/p excision    Sleep apnea     CPAP    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/2015    mid forehead    Unspecified vitamin D deficiency     Ventricular tachycardia     Vitamin B12 deficiency     Vitamin D deficiency disease      Social History     Social History    Marital status:      Spouse name: N/A    Number of children: N/A    Years of education: N/A     Occupational History    Homemaker      Other     Social History Main Topics    Smoking status: Never Smoker    Smokeless tobacco: Never Used    Alcohol use No    Drug use: No    Sexual activity: Yes     Partners: Male     Other Topics Concern    Are You Pregnant Or Think You May Be? 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    FRACTURE SURGERY      left elbow and wrist as a child    HYSTERECTOMY      MASTECTOMY      right    SQUAMOUS CELL CARCINOMA EXCISION      remved from forehead    TONSILLECTOMY       Family History   Problem Relation Age of Onset    Diabetes Father     Heart disease Father     Diabetes Sister     Heart disease Sister     Diabetes Brother     Heart disease Brother     Hypertension Brother     Diabetes Brother     Heart disease Brother     Hypertension Brother     Diabetes Brother     Heart disease Brother     Cancer Brother      colon    Diabetes Brother     Cancer Son      skin    Diabetes Son      prediabetes    Diabetes Daughter      prediabetes    Cancer Daughter      melanoma    Obesity Daughter     Melanoma  Daughter     Diabetes Son     Asthma Mother     Hypertension Mother     Stroke Mother     No Known Problems Maternal Grandmother     No Known Problems Maternal Grandfather     No Known Problems Paternal Grandmother     No Known Problems Paternal Grandfather     Amblyopia Neg Hx     Blindness Neg Hx     Cataracts Neg Hx     Glaucoma Neg Hx     Macular degeneration Neg Hx     Retinal detachment Neg Hx     Strabismus Neg Hx     Thyroid disease Neg Hx          Review of Systems  General: negative for chills, fever or weight loss  Psychological: negative for mood changes or depression  Ophthalmic: negative for blurry vision, photophobia or eye pain  ENT: see HPI  Respiratory: no cough, shortness of breath, or wheezing  Cardiovascular: no chest pain or dyspnea on exertion  Gastrointestinal: no abdominal pain, change in bowel habits, or black/ bloody stools  Musculoskeletal: negative for gait disturbance or muscular weakness  Neurological: no syncope or seizures; no ataxia  Dermatological: negative for puritis,  rash and jaundice  Hematologic/lymphatic: no easy bruising, no new lumps or bumps      Physical Exam:    Vitals:    01/11/18 1410   BP: (!) 150/68   Pulse: 88   Temp: 97.4 °F (36.3 °C)       Constitutional: Well appearing / communicating without difficutly.  NAD.  Eyes: EOM I Bilaterally  Head/Face: Normocephalic.  Negative paranasal sinus pressure/tenderness.  Salivary glands WNL.  House Brackmann I Bilaterally.    Right Ear: Auricle normal appearance. External Auditory Canal within normal limits no lesions or masses,TM w/o masses/lesions/perforations. TM mobility noted.   Left Ear: Auricle normal appearance. External Auditory Canal within normal limits no lesions or masses,TM w/ thickened TM effusion noted. TM mobility reduced.   Rinne Air conduction >bone conduction bilaterally, Herrera midline.   Nose: No gross nasal septal deviation. Inferior Turbinates 3+ bilaterally. No septal perforation. No  masses/lesions. External nasal skin appears normal without masses/lesions.  Oral Cavity: Gingiva/lips within normal limits.  Dentition/gingiva healthy appearing. Mucus membranes moist. Floor of mouth soft, no masses palpated. Oral Tongue mobile. Hard Palate appears normal.    Oropharynx: Base of tongue appears normal. No masses/lesions noted. Tonsillar fossa/pharyngeal wall without lesions. Posterior oropharynx WNL.  Soft palate without masses. Midline uvula.   Neck/Lymphatic: No LAD I-VI bilaterally.  No thyromegaly.  No masses noted on exam.    Mirror laryngoscopy/nasopharyngoscopy: Active gag reflex.  Unable to perform.    Neuro/Psychiatric: AOx3.  Normal mood and affect.   Cardiovascular: Normal carotid pulses bilaterally, no increasing jugular venous distention noted at cervical region bilaterally.    Respiratory: Normal respiratory effort, no stridor, no retractions noted.    Diagnostic testing ordered and reviewed:    Audiogram reviewed personally by myself and in detail with the patient today. moderate to profound sensorineural hearing loss in her right ear and a severe to profound mixed hearing loss in her left ear.    See separate procedure note for binocular microscopy.       Assessment:    ICD-10-CM ICD-9-CM    1. Mixed conductive and sensorineural hearing loss of left ear with restricted hearing of right ear H90.A32 389.22    2. Sensorineural hearing loss (SNHL) of right ear with restricted hearing of left ear H90.A21 389.22    3. Tinnitus aurium, bilateral H93.13 388.31    4. Chronic suppurative otitis media of left ear, unspecified otitis media location H66.3X2 382.3      The primary encounter diagnosis was Mixed conductive and sensorineural hearing loss of left ear with restricted hearing of right ear. Diagnoses of Sensorineural hearing loss (SNHL) of right ear with restricted hearing of left ear, Tinnitus aurium, bilateral, and Chronic suppurative otitis media of left ear, unspecified otitis media  location were also pertinent to this visit.      Plan:  No orders of the defined types were placed in this encounter.      Obtain prior records from Dr. Tanner.   Augmentin 875mg PO BID for 10 days. Follow up in 10 days to reassess left ear and likely repeat audiogram. Likely needs CT temporal bones if she has not had one recently with Dr. Tanner.     Thank you kindly for allowing me to participate in the patient's care.       Brittany Metcalf MD

## 2018-01-11 NOTE — TELEPHONE ENCOUNTER
----- Message from Cornina Hammer sent at 1/11/2018 10:04 AM CST -----  Contact: Jp, spouse, [phone]  Called in very upset that patient's 1/17 appointments were cancelled. States they both requested appointments together to see you here in [place] and patient was scheduled here and he was scheduled at main [place] (JP COTTRELL [[id number]]). Requests to be seen as soon as possible please.

## 2018-01-15 RX ORDER — CHLORTHALIDONE 25 MG/1
25 TABLET ORAL DAILY
Qty: 90 TABLET | Refills: 0 | Status: SHIPPED | OUTPATIENT
Start: 2018-01-15 | End: 2018-04-26 | Stop reason: SDUPTHER

## 2018-01-24 ENCOUNTER — CLINICAL SUPPORT (OUTPATIENT)
Dept: OTOLARYNGOLOGY | Facility: CLINIC | Age: 78
End: 2018-01-24
Payer: MEDICARE

## 2018-01-24 ENCOUNTER — OFFICE VISIT (OUTPATIENT)
Dept: OTOLARYNGOLOGY | Facility: CLINIC | Age: 78
End: 2018-01-24
Payer: MEDICARE

## 2018-01-24 VITALS — TEMPERATURE: 97 F | DIASTOLIC BLOOD PRESSURE: 75 MMHG | HEART RATE: 88 BPM | SYSTOLIC BLOOD PRESSURE: 172 MMHG

## 2018-01-24 DIAGNOSIS — H90.A32 MIXED CONDUCTIVE AND SENSORINEURAL HEARING LOSS OF LEFT EAR WITH RESTRICTED HEARING OF RIGHT EAR: ICD-10-CM

## 2018-01-24 DIAGNOSIS — H90.A12 CONDUCTIVE HEARING LOSS OF LEFT EAR WITH RESTRICTED HEARING OF RIGHT EAR: Primary | ICD-10-CM

## 2018-01-24 DIAGNOSIS — H69.92 ETD (EUSTACHIAN TUBE DYSFUNCTION), LEFT: ICD-10-CM

## 2018-01-24 DIAGNOSIS — H93.13 TINNITUS AURIUM, BILATERAL: ICD-10-CM

## 2018-01-24 DIAGNOSIS — H65.492 OTHER CHRONIC NONSUPPURATIVE OTITIS MEDIA OF LEFT EAR: Primary | ICD-10-CM

## 2018-01-24 DIAGNOSIS — H66.3X2 CHRONIC SUPPURATIVE OTITIS MEDIA OF LEFT EAR, UNSPECIFIED OTITIS MEDIA LOCATION: ICD-10-CM

## 2018-01-24 PROCEDURE — 99214 OFFICE O/P EST MOD 30 MIN: CPT | Mod: S$GLB,,, | Performed by: OTOLARYNGOLOGY

## 2018-01-24 PROCEDURE — 99999 PR PBB SHADOW E&M-EST. PATIENT-LVL III: CPT | Mod: PBBFAC,,, | Performed by: OTOLARYNGOLOGY

## 2018-01-24 PROCEDURE — 92552 PURE TONE AUDIOMETRY AIR: CPT | Mod: 52,S$GLB,, | Performed by: AUDIOLOGIST-HEARING AID FITTER

## 2018-01-24 PROCEDURE — 99999 PR PBB SHADOW E&M-EST. PATIENT-LVL I: CPT | Mod: PBBFAC,,, | Performed by: AUDIOLOGIST-HEARING AID FITTER

## 2018-01-24 PROCEDURE — 92567 TYMPANOMETRY: CPT | Mod: S$GLB,,, | Performed by: AUDIOLOGIST-HEARING AID FITTER

## 2018-01-24 RX ORDER — LANCING DEVICE
EACH MISCELLANEOUS
Status: ON HOLD | COMMUNITY
Start: 2018-01-19 | End: 2019-12-08

## 2018-01-24 RX ORDER — BLOOD-GLUCOSE CONTROL, NORMAL
EACH MISCELLANEOUS
Status: ON HOLD | COMMUNITY
Start: 2018-01-19 | End: 2019-12-08

## 2018-01-24 RX ORDER — BLOOD GLUCOSE CONTROL HIGH,LOW
EACH MISCELLANEOUS
Status: ON HOLD | COMMUNITY
Start: 2018-01-19 | End: 2019-12-08

## 2018-01-24 NOTE — PROGRESS NOTES
"  Chief Complaint   Patient presents with    Follow-up     hearing loss    Cerumen Impaction     right ear    .     HPI:  Myesha Quinones is a very pleasant 77 y.o. female here to see me today for the first time for evaluation of hearing loss.  She states that she has had lifelong ear problems.  She reports that as a child she pulled a cup of scalding hot coffee on and inside her left ear which resulted in an eardrum perforation. She states ever since this incident she "has never had normal hearing or ear."  She relays that in 1998 she had breast cancer and had hearing loss associated with the chemotherapy and obtained her first set of hearing aids in 1999.  She is here today to establish with ENT as her prior ENT Dr. Tanner no longer takes her insurance. She was referred for evaluation of her ears and for to evaluate for cerumen removal as her hearing aids have not been working as well recently.  She reports hearing loss that has been gradually progressing over the last several years.  She has not had any recent issues with ear pain or ear drainage.  She denies a family history of hearing loss, and has not had any previous otologic surgery.  She denies any history of significant loud noise exposure.She denies issues with dizziness.    Interval HPI:  She states her hearing seems about the same.  She still feels aural fullness in the right ear. She denies ear pain. No otorrhea. She has completed her Augmetin and does not feel a major improvement in her ears.          Past Medical History:   Diagnosis Date    Allergy     Asthma     Basal cell carcinoma     left forehead    Basal cell carcinoma     left nose    Basal cell carcinoma 05/20/2015    right nose    Basal cell carcinoma 12/22/2015    left lower post neck    CAD (coronary artery disease)     Cardiomyopathy     Cardiomyopathy, ischemic     Cataract     CHF (congestive heart failure)     Chronic kidney disease, stage 3, mod decreased GFR " 2/14/2017    COPD (chronic obstructive pulmonary disease)     Defibrillator discharge     Diabetes mellitus     Diabetes mellitus type II     Diabetes with neurologic complications     Goiter     MNG    HX: breast cancer     Hyperlipidemia     Hypertension     Iron deficiency anemia 5/16/2017    Left kidney mass     Meningioma     Osteoporosis, postmenopausal     Pacemaker     Skin cancer     s/p excision    Sleep apnea     CPAP    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/2015    mid forehead    Unspecified vitamin D deficiency     Ventricular tachycardia     Vitamin B12 deficiency     Vitamin D deficiency disease      Social History     Social History    Marital status:      Spouse name: N/A    Number of children: N/A    Years of education: N/A     Occupational History    Homemaker      Other     Social History Main Topics    Smoking status: Never Smoker    Smokeless tobacco: Never Used    Alcohol use No    Drug use: No    Sexual activity: Yes     Partners: Male     Other Topics Concern    Are You Pregnant Or Think You May Be? 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    FRACTURE SURGERY      left elbow and wrist as a child    HYSTERECTOMY      MASTECTOMY      right    SQUAMOUS CELL CARCINOMA EXCISION      remved from forehead    TONSILLECTOMY       Family History   Problem Relation Age of Onset    Diabetes Father     Heart disease Father     Diabetes Sister     Heart disease Sister     Diabetes Brother     Heart disease Brother     Hypertension Brother     Diabetes Brother     Heart disease Brother     Hypertension Brother      Diabetes Brother     Heart disease Brother     Cancer Brother      colon    Diabetes Brother     Cancer Son      skin    Diabetes Son      prediabetes    Diabetes Daughter      prediabetes    Cancer Daughter      melanoma    Obesity Daughter     Melanoma Daughter     Diabetes Son     Asthma Mother     Hypertension Mother     Stroke Mother     No Known Problems Maternal Grandmother     No Known Problems Maternal Grandfather     No Known Problems Paternal Grandmother     No Known Problems Paternal Grandfather     Amblyopia Neg Hx     Blindness Neg Hx     Cataracts Neg Hx     Glaucoma Neg Hx     Macular degeneration Neg Hx     Retinal detachment Neg Hx     Strabismus Neg Hx     Thyroid disease Neg Hx          Review of Systems  General: negative for chills, fever or weight loss  Psychological: negative for mood changes or depression  Ophthalmic: negative for blurry vision, photophobia or eye pain  ENT: see HPI  Respiratory: no cough, shortness of breath, or wheezing  Cardiovascular: no chest pain or dyspnea on exertion  Gastrointestinal: no abdominal pain, change in bowel habits, or black/ bloody stools  Musculoskeletal: negative for gait disturbance or muscular weakness  Neurological: no syncope or seizures; no ataxia  Dermatological: negative for puritis,  rash and jaundice  Hematologic/lymphatic: no easy bruising, no new lumps or bumps      Physical Exam:    Vitals:    01/24/18 0823   BP: (!) 172/75   Pulse: 88   Temp: 97.1 °F (36.2 °C)       Constitutional: Well appearing / communicating without difficutly.  NAD.  Eyes: EOM I Bilaterally  Head/Face: Normocephalic.  Negative paranasal sinus pressure/tenderness.  Salivary glands WNL.  House Brackmann I Bilaterally.    Right Ear: Auricle normal appearance. External Auditory Canal within normal limits no lesions or masses,TM w/o masses/lesions/perforationsp; monomeric segment centrally.  TM mobility noted.   Left Ear: Auricle normal  appearance. External Auditory Canal within normal limits no lesions or masses,TM w/ thickened with tympanosclerosis present. TM mobility reduced.   Rinne Air conduction >bone conduction bilaterally, Herrera midline.   Nose: No gross nasal septal deviation. Inferior Turbinates 3+ bilaterally. No septal perforation. No masses/lesions. External nasal skin appears normal without masses/lesions.  Oral Cavity: Gingiva/lips within normal limits.  Dentition/gingiva healthy appearing. Mucus membranes moist. Floor of mouth soft, no masses palpated. Oral Tongue mobile. Hard Palate appears normal.    Oropharynx: Base of tongue appears normal. No masses/lesions noted. Tonsillar fossa/pharyngeal wall without lesions. Posterior oropharynx WNL.  Soft palate without masses. Midline uvula.   Neck/Lymphatic: No LAD I-VI bilaterally.  No thyromegaly.  No masses noted on exam.    Mirror laryngoscopy/nasopharyngoscopy: Active gag reflex.  Unable to perform.    Neuro/Psychiatric: AOx3.  Normal mood and affect.   Cardiovascular: Normal carotid pulses bilaterally, no increasing jugular venous distention noted at cervical region bilaterally.    Respiratory: Normal respiratory effort, no stridor, no retractions noted.    Diagnostic testing ordered and reviewed:    Audiogram reviewed personally by myself and in detail with the patient today. moderate to profound sensorineural hearing loss in her right ear and a severe to profound mixed hearing loss in her left ear. Unchanged from last audiogram.  Stable when compared to audiogram in 3/2015.    Outside records review summary:   Records obtained and reviewed from Dr. Millan and his partners:  Long standing history since 1998 of SNHL AD and Mixed hearing loss AS.  Over time hearing on the right has declined to include a mixed low frequency hearing loss as evidenced by the last audiogram in 2015.  She has has a CT of the temporal bones in 2013 which did not show evidence of cholesteatoma; intact  ossicular chain, and middle ear aeration.  She has had multiple visits for chronic otitis media, TM perforation AS, chronic otorrhea.        Assessment:    ICD-10-CM ICD-9-CM    1. Other chronic nonsuppurative otitis media of left ear H65.492 381.3 CT Temporal Bone without contrast   2. Mixed conductive and sensorineural hearing loss of left ear with restricted hearing of right ear H90.A32 389.22    3. ETD (Eustachian tube dysfunction), left H69.82 381.81    4. Tinnitus aurium, bilateral H93.13 388.31    5. Chronic suppurative otitis media of left ear, unspecified otitis media location H66.3X2 382.3      The primary encounter diagnosis was Other chronic nonsuppurative otitis media of left ear. Diagnoses of Mixed conductive and sensorineural hearing loss of left ear with restricted hearing of right ear, ETD (Eustachian tube dysfunction), left, Tinnitus aurium, bilateral, and Chronic suppurative otitis media of left ear, unspecified otitis media location were also pertinent to this visit.      Plan:  Orders Placed This Encounter   Procedures    CT Temporal Bone without contrast     Discussed with patient that her hearing seems to be stable from 2013.  She is feeling more aural fullness which is most likely related to ETD. I recommend continuing Astelin and adding Claritin daily.   I have recommended that she obtain a CT scan of the temporal bones to evaluate for cholesteatoma/ chronic OM/mastoiditis.       Brittany Metcalf MD

## 2018-01-24 NOTE — PROGRESS NOTES
Danis Vazquez, -AAA  [facility] [address]  [address]  [place] [address]                        Patient: Myesha Quinones          MRN: [id number]             : 1940  PELAYO: 2018     AUDIOLOGICAL EVALUATION     CASE HISTORY:    Myesha Quinones, 77 y.o., was seen on the above date for a repeat audiological evaluation. Her last evaluation on 18 revealed a moderate to profound sensorineural hearing loss in her right ear and a severe to profound conductive hearing loss in her left ear. The left ear was checked today and tympanometry for both ears.      TEST RESULTS:   Imittance testing revealed normal middle ear compliance (Type A) in her right ear and stiff middle ear compliance (Type B) with normal volume measurements in her left ear, which is consistent with a middle ear pathology. Pure tone testing indicated that Myesha Quinones has a severe to profound conductive hearing loss in her left ear. Hearing is stable compared to her last audiogram and a previous audiogram completed by Dr. Arora's office in .      RECOMMENDATIONS:   It is recommended that she:  · Follow-up with her audiologist to manage her Resound hearing aids.   · Continue to follow up annually for hearing tests to monitor hearing status.      If you should have any questions or concerns regarding the above information, please do not hesitate to contact me at [phone].      _______________________________  Susannah Vazquez, [facility]  Clinical Audiologist   enclosure: audiogram         Pt is a 58 yo M who came to the Ed c/o abd pain s/p surgery 11/3 for pancreatic mass  with possible gastric perforation.  Hx colon ca. The patient reports that he had an exploratory lap 3 weeks ago for pancreatic mass, which was inoperable, pt to start chemo.  Pt had abd ct yesterday which showed possible gastric perforation.  c/o abdominal pain, which has been chronic for the past few month. Denies fevers/chills, CP, SOB, dysuria, no n/v/d. No BM x 2 days.

## 2018-01-25 ENCOUNTER — PATIENT OUTREACH (OUTPATIENT)
Dept: OTHER | Facility: OTHER | Age: 78
End: 2018-01-25

## 2018-01-25 ENCOUNTER — OFFICE VISIT (OUTPATIENT)
Dept: OPTOMETRY | Facility: CLINIC | Age: 78
End: 2018-01-25
Payer: MEDICARE

## 2018-01-25 DIAGNOSIS — Z96.1 PSEUDOPHAKIA OF BOTH EYES: ICD-10-CM

## 2018-01-25 DIAGNOSIS — E11.3212 MILD NONPROLIFERATIVE DIABETIC RETINOPATHY OF LEFT EYE WITH MACULAR EDEMA ASSOCIATED WITH TYPE 2 DIABETES MELLITUS: Primary | ICD-10-CM

## 2018-01-25 PROCEDURE — 99499 UNLISTED E&M SERVICE: CPT | Mod: S$GLB,,, | Performed by: OPTOMETRIST

## 2018-01-25 PROCEDURE — 99999 PR PBB SHADOW E&M-EST. PATIENT-LVL II: CPT | Mod: PBBFAC,,, | Performed by: OPTOMETRIST

## 2018-01-25 PROCEDURE — 92014 COMPRE OPH EXAM EST PT 1/>: CPT | Mod: S$GLB,,, | Performed by: OPTOMETRIST

## 2018-01-25 NOTE — PROGRESS NOTES
Last 5 Patient Entered Readings                                      Current 30 Day Average: 154/70     Recent Readings 1/23/2018 1/21/2018 1/21/2018 1/18/2018 1/16/2018    SBP (mmHg) 153 163 172 157 151    DBP (mmHg) 66 72 79 77 71    Pulse 79 85 87 74 66        MsRandi Quinones's BP has been higher recently. She has never charged her BP cuff, but will do so today. Explained that if her cuff is not fully charged, it may give inaccurate BP readings. She will also bring it with her to her cardiology clinic appointment tomorrow to have it checked for accuracy.     Patient denies s/s of hypertension (SOB, CP, severe headaches, changes in vision) associated with high readings. Instructed patient to go to the ED if BP > 180/110 and accompanied by hypertensive s/s, patient confirms understanding.    Will continue to monitor regularly. Will follow up in 2-3 weeks, sooner if BP begins to trend upward or downward.    Patient has my contact information and knows to call with any concerns or clinical changes.     Current HTN regimen:  Hypertension Medications             carvedilol (COREG) 25 MG tablet TAKE 2 TABLETS BY MOUTH TWICE A DAY    chlorthalidone (HYGROTEN) 25 MG Tab TAKE 1 TABLET (25 MG TOTAL) BY MOUTH ONCE DAILY.    hydrALAZINE (APRESOLINE) 100 MG tablet Take 1 tablet (100 mg total) by mouth 3 (three) times daily.    nitroGLYCERIN (NITROSTAT) 0.4 MG SL tablet Place 1 tablet under the tongue continuous prn.      valsartan (DIOVAN) 320 MG tablet Take 1 tablet (320 mg total) by mouth once daily.

## 2018-01-25 NOTE — PROGRESS NOTES
HPI     Ms. Myesha LAURA Joni for diabetic eye exam follow up/DFE  Patient complains of blurry vision OU.    (-)drops  (-)flashes  (-)floaters  (-)diplopia    Diabetic yes 66 am  Hemoglobin A1C       Date                     Value               Ref Range             Status                11/16/2017               7.2 (H)             4.0 - 5.6 %           Final                 10/05/2017               7.6 (H)             4.0 - 5.6 %           Final                 08/07/2017               7.7 (H)             4.0 - 5.6 %           Final               OCULAR HISTORY  Last Eye Exam 2017  (-)eye surgery   (-)diagnosed or treated for any eye conditions or diseases -    FAMILY HISTORY  (-)Glaucoma -        Last edited by Boaz Young, OD on 1/25/2018  9:21 AM. (History)        ROS     Positive for: Endocrine (DM), Eyes (cat surgery OU)    Negative for: Constitutional, Gastrointestinal, Neurological, Skin,   Genitourinary, Musculoskeletal, HENT, Cardiovascular, Respiratory,   Psychiatric, Allergic/Imm, Heme/Lymph    Last edited by Boaz Young, OD on 1/25/2018  9:21 AM. (History)        Assessment /Plan     For exam results, see Encounter Report.    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 Mild pco sp pciol ou  2. fam hx glauc  3. DM- with mild NPDR OS/ MAs /exudate in mac/CSME--[facility] OS has decreased since last year  4.  ocular migraine Hx    PLAN:    1. Retinal consult  2. Pt to cont  amsler grid use

## 2018-01-26 ENCOUNTER — OFFICE VISIT (OUTPATIENT)
Dept: CARDIOLOGY | Facility: CLINIC | Age: 78
End: 2018-01-26
Payer: MEDICARE

## 2018-01-26 VITALS
HEART RATE: 87 BPM | BODY MASS INDEX: 29.57 KG/M2 | SYSTOLIC BLOOD PRESSURE: 160 MMHG | DIASTOLIC BLOOD PRESSURE: 69 MMHG | HEIGHT: 63 IN | WEIGHT: 166.88 LBS

## 2018-01-26 DIAGNOSIS — I10 ESSENTIAL HYPERTENSION: ICD-10-CM

## 2018-01-26 DIAGNOSIS — I50.22 CHRONIC SYSTOLIC HEART FAILURE: ICD-10-CM

## 2018-01-26 DIAGNOSIS — Z95.810 BIVENTRICULAR ICD (IMPLANTABLE CARDIOVERTER-DEFIBRILLATOR) IN PLACE: ICD-10-CM

## 2018-01-26 DIAGNOSIS — E78.00 PURE HYPERCHOLESTEROLEMIA: ICD-10-CM

## 2018-01-26 DIAGNOSIS — I42.8 NONISCHEMIC CARDIOMYOPATHY: Primary | ICD-10-CM

## 2018-01-26 DIAGNOSIS — G47.33 OSA (OBSTRUCTIVE SLEEP APNEA): ICD-10-CM

## 2018-01-26 PROCEDURE — 99214 OFFICE O/P EST MOD 30 MIN: CPT | Mod: S$GLB,,, | Performed by: INTERNAL MEDICINE

## 2018-01-26 PROCEDURE — 99999 PR PBB SHADOW E&M-EST. PATIENT-LVL III: CPT | Mod: PBBFAC,,, | Performed by: INTERNAL MEDICINE

## 2018-01-26 PROCEDURE — 99499 UNLISTED E&M SERVICE: CPT | Mod: S$GLB,,, | Performed by: INTERNAL MEDICINE

## 2018-01-26 RX ORDER — CARVEDILOL 25 MG/1
50 TABLET ORAL 2 TIMES DAILY
Qty: 360 TABLET | Refills: 3 | Status: SHIPPED | OUTPATIENT
Start: 2018-01-26 | End: 2019-02-18 | Stop reason: SDUPTHER

## 2018-01-26 NOTE — PROGRESS NOTES
"Subjective:   Patient ID:  Myesha Quinones is a 77 y.o. female who presents for follow-up of Nonischemic cardiomyopathy (3 months fu)      HPI:   Myesha Quinones presents for follow up of non-ischemic cardiomyopathy with last EF 50% following CRT. Myesha Quinones has hypertension with continued elevation now in the [facility]. States most elevation is in the A:M. Myesha Quinones is not exercising.Myesha Quinones denies chest pain, shortness of breath, palpitations, presyncope , or syncope. Myesha Quinones has dyslipidemia  on moderate intensity statin therapy. .    Review of Systems   Constitution: Negative for weakness, malaise/fatigue, weight gain and weight loss.   Eyes: Negative for blurred vision.   Cardiovascular: Negative for chest pain, claudication, cyanosis, dyspnea on exertion, irregular heartbeat, leg swelling, near-syncope, orthopnea, palpitations, paroxysmal nocturnal dyspnea and syncope.   Respiratory: Negative for cough, shortness of breath and wheezing.         KHOA on CPAP   Musculoskeletal: Positive for joint pain. Negative for falls and myalgias.   Gastrointestinal: Negative for abdominal pain, heartburn, nausea and vomiting.   Genitourinary: Negative for nocturia.   Neurological: Negative for brief paralysis, dizziness, focal weakness, headaches, numbness and paresthesias.   Psychiatric/Behavioral: Negative for altered mental status.       Current Outpatient Prescriptions   Medication Sig    ACCU-CHEK HECTOR CONTROL SOLN Soln     ACCU-CHEK HECTOR PLUS METER Misc     aspirin (ENTERIC COATED ASPIRIN) 81 MG EC tablet Take 1 tablet by mouth.    BD INSULIN PEN NEEDLE UF MINI 31 gauge x 3/16" Ndle USE WITH LANTUS AT BEDTIME    blood sugar diagnostic (ACCU-CHEK HECTOR) Strp Uses Accu-Check Hector meter to test BG 4x/day (Patient taking differently: Uses Accu-Check Hector meter to test BG 4x/day checking 2x day)    blood-glucose meter kit Dispense Accu-Chek " "Angelica meter. Use as instructed    carvedilol (COREG) 25 MG tablet TAKE 2 TABLETS BY MOUTH TWICE A DAY    chlorthalidone (HYGROTEN) 25 MG Tab TAKE 1 TABLET (25 MG TOTAL) BY MOUTH ONCE DAILY.    CYANOCOBALAMIN, VITAMIN B-12, (B-12 DOTS ORAL) Take 1 tablet by mouth once daily.    fluticasone-salmeterol 250-50 mcg/dose (ADVAIR) 250-50 mcg/dose diskus inhaler Inhale 1 puff into the lungs 2 (two) times daily. This is a change from one month    glimepiride (AMARYL) 2 MG tablet TAKE 1 TABLET BY MOUTH daily    hydrALAZINE (APRESOLINE) 100 MG tablet Take 1 tablet (100 mg total) by mouth 3 (three) times daily.    insulin glargine (LANTUS SOLOSTAR) 100 unit/mL (3 mL) InPn pen INJECT 40 UNITS INTO THE SKIN EVERY EVENING.    lancets (ACCU-CHEK SOFTCLIX LANCETS) Misc Uses Accu-Chek Angelica meter to test BG 4x/day    lancets 30 gauge Misc     lancing device Misc     linagliptin (TRADJENTA) 5 mg Tab tablet Take 1 tablet (5 mg total) by mouth once daily.    magnesium oxide (MAG-OX) 400 mg tablet Take 1 tablet (400 mg total) by mouth once daily.    metFORMIN (GLUCOPHAGE) 500 MG tablet TAKE 2 TABLETS (1,000 MG TOTAL) BY MOUTH 2 (TWO) TIMES DAILY WITH MEALS.    nitroGLYCERIN (NITROSTAT) 0.4 MG SL tablet Place 1 tablet under the tongue continuous prn.      omega-3 fatty acids (FISH OIL) 500 mg Cap Take 1 capsule by mouth Twice daily.    pen needle, diabetic (BD INSULIN PEN NEEDLE UF MINI) 31 gauge x 3/16" Ndle USE WITH LANTUS AT BEDTIME    primidone (MYSOLINE) 50 MG Tab Take as directed. (Patient taking differently: Take 50 mg by mouth 2 (two) times daily. Take as directed.)    sertraline (ZOLOFT) 25 MG tablet Take 1 tablet (25 mg total) by mouth every evening. (Patient taking differently: Take 12.5 mg by mouth every evening. )    simvastatin (ZOCOR) 20 MG tablet TAKE 1 TABLET (20 MG TOTAL) BY MOUTH EVERY EVENING.    valsartan (DIOVAN) 320 MG tablet Take 1 tablet (320 mg total) by mouth once daily.    albuterol " "(PROVENTIL) 2.5 mg /3 mL (0.083 %) nebulizer solution Take 3 mLs (2.5 mg total) by nebulization every 6 (six) hours as needed for Wheezing.    azelastine (ASTELIN) 137 mcg (0.1 %) nasal spray 1 spray (137 mcg total) by Nasal route 2 (two) times daily. For severe allergy     No current facility-administered medications for this visit.      Objective:   Physical Exam   Constitutional: She is oriented to person, place, and time. She appears well-developed. No distress.   BP (!) 160/69 (BP Location: Left arm, Patient Position: Sitting, BP Method: X-Large (Automatic))   Pulse 87   Ht 5' 3" (1.6 m)   Wt 75.7 kg (166 lb 14.2 oz)   BMI 29.56 kg/m²    HENT:   Head: Normocephalic.   Eyes: Conjunctivae are normal. Pupils are equal, round, and reactive to light. No scleral icterus.   Neck: Neck supple. No JVD present. No thyromegaly present.   Cardiovascular: Normal rate, regular rhythm, normal heart sounds and intact distal pulses.  PMI is not displaced.  Exam reveals no gallop and no friction rub.    No murmur heard.  Trace pedal edema.   Pulmonary/Chest: Effort normal and breath sounds normal. No respiratory distress. She has no wheezes. She has no rales.   Abdominal: Soft. She exhibits no distension. There is no splenomegaly or hepatomegaly. There is no tenderness.   Musculoskeletal: She exhibits no edema or tenderness.   Gait normal  Right arm lymphedema    Neurological: She is alert and oriented to person, place, and time.   Skin: Skin is warm and dry. She is not diaphoretic.   Psychiatric: She has a normal mood and affect. Her behavior is normal.       Lab Results   Component Value Date     12/07/2017    K 4.6 12/07/2017     12/07/2017    CO2 23 12/07/2017    BUN 28 (H) 12/07/2017    CREATININE 1.2 12/07/2017     (H) 12/07/2017    HGBA1C 7.2 (H) 11/16/2017    MG 1.5 (L) 12/07/2017    AST 20 08/07/2017    ALT 23 08/07/2017    ALBUMIN 3.6 08/07/2017    PROT 6.9 08/07/2017    BILITOT 0.3 08/07/2017 "    WBC 8.52 08/07/2017    HGB 11.3 (L) 08/07/2017    HCT 35.3 (L) 08/07/2017    MCV 91 08/07/2017     08/07/2017    TSH 0.560 08/07/2017    CHOL 102 (L) 05/11/2017    HDL 42 05/11/2017    LDLCALC 39.8 (L) 05/11/2017    TRIG 101 05/11/2017       Assessment:     1. Nonischemic cardiomyopathy EF 50% S/P CRT   2. Chronic systolic heart failure : Compensated   3. Essential hypertension : Not well controlled ( Digital Monitor 20 mmHg higher than manual )   4. Biventricular ICD (implantable cardioverter-defibrillator) in place    5. Pure hypercholesterolemia :  on moderate intensity statin therapy.   6. KHOA (obstructive sleep apnea) : Wears CPAP       Plan:     Myesha was seen today for nonischemic cardiomyopathy.    Diagnoses and all orders for this visit:    Nonischemic cardiomyopathy  Continue current regimen    Chronic systolic heart failure    Essential hypertension  Instructed to take Valsartan in the evening  Emphasized sodium restriction  Will send to exchange the Digital monitor.  Biventricular ICD (implantable cardioverter-defibrillator) in place    Pure hypercholesterolemia  Continue current regimen  KHOA (obstructive sleep apnea)    Other orders  -     carvedilol (COREG) 25 MG tablet; Take 2 tablets (50 mg total) by mouth 2 (two) times daily.

## 2018-01-31 ENCOUNTER — HOSPITAL ENCOUNTER (OUTPATIENT)
Dept: RADIOLOGY | Facility: HOSPITAL | Age: 78
Discharge: HOME | End: 2018-01-31
Attending: OTOLARYNGOLOGY
Payer: MEDICARE

## 2018-01-31 DIAGNOSIS — H65.492 OTHER CHRONIC NONSUPPURATIVE OTITIS MEDIA OF LEFT EAR: ICD-10-CM

## 2018-01-31 PROCEDURE — 70480 CT ORBIT/EAR/FOSSA W/O DYE: CPT | Mod: 26,,, | Performed by: RADIOLOGY

## 2018-01-31 PROCEDURE — 70480 CT ORBIT/EAR/FOSSA W/O DYE: CPT | Mod: TC

## 2018-02-01 ENCOUNTER — PATIENT OUTREACH (OUTPATIENT)
Dept: OTHER | Facility: OTHER | Age: 78
End: 2018-02-01

## 2018-02-01 NOTE — PROGRESS NOTES
"Last 5 Patient Entered Readings                                      Current 30 Day Average: 154/70     Recent Readings 1/31/2018 1/30/2018 1/30/2018 1/29/2018 1/26/2018    SBP (mmHg) 138 173 183 157 160    DBP (mmHg) 65 79 78 75 70    Pulse 75 77 74 76 73          Hypertension Digital Medicine Program (HDMP): Health  Follow Up    Lifestyle Modifications:    1.Low sodium diet: yes Patient reports she continues to choose low sodium options and follow low sodium diet of <2000mg sodium per day most of the time.  Notes she has been working harder on this and wants to try to "do her best". Praised patient for efforts and reminded her that all of the little change she makes add up over time. Encouraged continued adherence to this.     2.Physical activity: no Per patient, is not comfortable with exercise due to asthma. Discussed importance of moving around the house frequently, reinforcing again that every little bit counts.     3.Hypotension/Hypertension symptoms: yes Reports "feeling woozy" however does not believe this is related to blood pressure. States she believes it is due to primidone.     4.Patient has been compliant with the medication regimen. States she has changed timing of medications per cardiologist in addition to getting a new blood pressure cuff.  States readings beginning from yesterday are with new device.     Follow up with  Myesha Quinones completed. No further questions or concerns. I will follow up in a few weeks to assess progress.       "

## 2018-02-05 ENCOUNTER — OFFICE VISIT (OUTPATIENT)
Dept: GASTROENTEROLOGY | Facility: CLINIC | Age: 78
End: 2018-02-05
Payer: MEDICARE

## 2018-02-05 VITALS
BODY MASS INDEX: 29.45 KG/M2 | DIASTOLIC BLOOD PRESSURE: 62 MMHG | SYSTOLIC BLOOD PRESSURE: 159 MMHG | WEIGHT: 166.25 LBS

## 2018-02-05 DIAGNOSIS — R10.32 LLQ ABDOMINAL PAIN: Primary | ICD-10-CM

## 2018-02-05 DIAGNOSIS — Z85.3 HISTORY OF BREAST CANCER: Primary | ICD-10-CM

## 2018-02-05 PROCEDURE — 1126F AMNT PAIN NOTED NONE PRSNT: CPT | Mod: S$GLB,,, | Performed by: INTERNAL MEDICINE

## 2018-02-05 PROCEDURE — 3008F BODY MASS INDEX DOCD: CPT | Mod: S$GLB,,, | Performed by: INTERNAL MEDICINE

## 2018-02-05 PROCEDURE — 1159F MED LIST DOCD IN RCRD: CPT | Mod: S$GLB,,, | Performed by: INTERNAL MEDICINE

## 2018-02-05 PROCEDURE — 99203 OFFICE O/P NEW LOW 30 MIN: CPT | Mod: S$GLB,,, | Performed by: INTERNAL MEDICINE

## 2018-02-05 PROCEDURE — 99999 PR PBB SHADOW E&M-EST. PATIENT-LVL III: CPT | Mod: PBBFAC,,, | Performed by: INTERNAL MEDICINE

## 2018-02-05 NOTE — PROGRESS NOTES
Subjective:       Patient ID: Myesha Quinones is a 77 y.o. female.    Chief Complaint: Abdominal Pain    Abdominal Pain   This is a new problem. The current episode started more than 1 month ago. The onset quality is sudden. The problem has been resolved. The pain is located in the LLQ. Pertinent negatives include no frequency, headaches or hematuria.   Pain has completely resolved.    Review of Systems   Constitutional: Negative for appetite change.   HENT: Negative for trouble swallowing.    Eyes: Negative for photophobia.   Respiratory: Negative for cough and shortness of breath.    Cardiovascular: Negative for palpitations.   Gastrointestinal: Positive for abdominal pain.        See HPI for details   Genitourinary: Negative for frequency and hematuria.   Skin: Negative for rash.   Neurological: Negative for weakness and headaches.   Psychiatric/Behavioral: Negative for behavioral problems and suicidal ideas.       Objective:      Physical Exam   Eyes: Pupils are equal, round, and reactive to light.   Neck: No thyromegaly present.   Cardiovascular: Normal rate, regular rhythm and normal heart sounds.    Pulmonary/Chest: Effort normal and breath sounds normal.   Abdominal: Soft. She exhibits no mass. There is no tenderness. There is no rebound and no guarding.   Musculoskeletal: She exhibits no tenderness.   Psychiatric: Her behavior is normal. Thought content normal.       Assessment:       1. LLQ abdominal pain        Plan:       Myesha was seen today for abdominal pain.    Diagnoses and all orders for this visit:    LLQ abdominal pain      Pain has resolved.   RTCprn

## 2018-02-05 NOTE — LETTER
February 5, 2018      Emelina Gaston MD  [address]  [facility] [address]           [place] [facility]  [address] [place], [place] [address] [place] [address]  Phone: [phone]          Patient: Myesha Quinones   MR Number: [id number]   YOB: 1940   Date of Visit: 2/5/2018       Dear Dr. Emelina Gaston:    Thank you for referring Myesha Quinones to me for evaluation. Attached you will find relevant portions of my assessment and plan of care.    If you have questions, please do not hesitate to call me. I look forward to following Myesha Quinones along with you.    Sincerely,    Ludivina Gilbert MD    Enclosure  CC:  No Recipients    If you would like to receive this communication electronically, please contact [email or web address] or [phone] to request more information on [email or web address] access.    For providers and/or their staff who would like to refer a patient to [facility], please contact us through our one-stop-shop provider referral line, [facility], at [phone].    If you feel you have received this communication in error or would no longer like to receive these types of communications, please e-mail [email or web address]

## 2018-02-08 ENCOUNTER — LAB VISIT (OUTPATIENT)
Dept: LAB | Facility: HOSPITAL | Age: 78
End: 2018-02-08
Attending: NURSE PRACTITIONER
Payer: MEDICARE

## 2018-02-08 ENCOUNTER — TELEPHONE (OUTPATIENT)
Dept: OTOLARYNGOLOGY | Facility: CLINIC | Age: 78
End: 2018-02-08

## 2018-02-08 DIAGNOSIS — E11.42 DIABETIC POLYNEUROPATHY ASSOCIATED WITH TYPE 2 DIABETES MELLITUS: ICD-10-CM

## 2018-02-08 LAB
ESTIMATED AVG GLUCOSE: 143 MG/DL
HBA1C MFR BLD HPLC: 6.6 %

## 2018-02-08 PROCEDURE — 36415 COLL VENOUS BLD VENIPUNCTURE: CPT | Mod: PO

## 2018-02-08 PROCEDURE — 83036 HEMOGLOBIN GLYCOSYLATED A1C: CPT

## 2018-02-08 NOTE — TELEPHONE ENCOUNTER
----- Message from Rand Tao sent at 2/8/2018  9:30 AM CST -----  Contact: self/[phone]  TEST RESULTS:  Patient would like to get test results.  Name of test:Ctscan  Date of test:January 31

## 2018-02-09 ENCOUNTER — PATIENT OUTREACH (OUTPATIENT)
Dept: OTHER | Facility: OTHER | Age: 78
End: 2018-02-09

## 2018-02-09 NOTE — PROGRESS NOTES
Last 5 Patient Entered Readings                                      Current 30 Day Average: 155/71     Recent Readings 2/7/2018 2/5/2018 2/2/2018 2/1/2018 2/1/2018    SBP (mmHg) 139 137 148 164 180    DBP (mmHg) 61 62 68 77 80    Pulse 72 68 73 81 81        Ms. Joni's BP readings are leveling off. She received a new BP cuff on 1/31/18. She feels these readings are more accurate than what was previously reported. She has no questions or concerns at this time.     Will continue to monitor regularly. Will follow up in 4-6 weeks, sooner if BP begins to trend upward or downward.    Patient has my contact information and knows to call with any concerns or clinical changes.     Current HTN regimen:  Hypertension Medications             carvedilol (COREG) 25 MG tablet Take 2 tablets (50 mg total) by mouth 2 (two) times daily.    chlorthalidone (HYGROTEN) 25 MG Tab TAKE 1 TABLET (25 MG TOTAL) BY MOUTH ONCE DAILY.    hydrALAZINE (APRESOLINE) 100 MG tablet Take 1 tablet (100 mg total) by mouth 3 (three) times daily.    nitroGLYCERIN (NITROSTAT) 0.4 MG SL tablet Place 1 tablet under the tongue continuous prn.      valsartan (DIOVAN) 320 MG tablet Take 1 tablet (320 mg total) by mouth once daily.

## 2018-02-15 ENCOUNTER — OFFICE VISIT (OUTPATIENT)
Dept: ENDOCRINOLOGY | Facility: CLINIC | Age: 78
End: 2018-02-15
Payer: MEDICARE

## 2018-02-15 VITALS
WEIGHT: 165.69 LBS | BODY MASS INDEX: 29.36 KG/M2 | SYSTOLIC BLOOD PRESSURE: 122 MMHG | HEART RATE: 69 BPM | HEIGHT: 63 IN | DIASTOLIC BLOOD PRESSURE: 78 MMHG

## 2018-02-15 DIAGNOSIS — I10 ESSENTIAL HYPERTENSION: ICD-10-CM

## 2018-02-15 DIAGNOSIS — N18.30 CHRONIC KIDNEY DISEASE, STAGE 3, MOD DECREASED GFR: ICD-10-CM

## 2018-02-15 DIAGNOSIS — E11.42 DIABETIC POLYNEUROPATHY ASSOCIATED WITH TYPE 2 DIABETES MELLITUS: ICD-10-CM

## 2018-02-15 DIAGNOSIS — E11.22 TYPE 2 DIABETES MELLITUS WITH STAGE 3 CHRONIC KIDNEY DISEASE, WITH LONG-TERM CURRENT USE OF INSULIN: Primary | ICD-10-CM

## 2018-02-15 DIAGNOSIS — N18.30 TYPE 2 DIABETES MELLITUS WITH STAGE 3 CHRONIC KIDNEY DISEASE, WITH LONG-TERM CURRENT USE OF INSULIN: Primary | ICD-10-CM

## 2018-02-15 DIAGNOSIS — Z79.4 TYPE 2 DIABETES MELLITUS WITH STAGE 3 CHRONIC KIDNEY DISEASE, WITH LONG-TERM CURRENT USE OF INSULIN: Primary | ICD-10-CM

## 2018-02-15 DIAGNOSIS — I50.22 CHRONIC SYSTOLIC HEART FAILURE: ICD-10-CM

## 2018-02-15 DIAGNOSIS — E11.649 HYPOGLYCEMIA DUE TO TYPE 2 DIABETES MELLITUS: ICD-10-CM

## 2018-02-15 DIAGNOSIS — I42.8 NONISCHEMIC CARDIOMYOPATHY: ICD-10-CM

## 2018-02-15 DIAGNOSIS — I25.10 CORONARY ARTERY DISEASE INVOLVING NATIVE CORONARY ARTERY OF NATIVE HEART WITHOUT ANGINA PECTORIS: ICD-10-CM

## 2018-02-15 DIAGNOSIS — E78.00 PURE HYPERCHOLESTEROLEMIA: ICD-10-CM

## 2018-02-15 DIAGNOSIS — G47.33 OSA (OBSTRUCTIVE SLEEP APNEA): ICD-10-CM

## 2018-02-15 PROCEDURE — 99214 OFFICE O/P EST MOD 30 MIN: CPT | Mod: S$GLB,,, | Performed by: NURSE PRACTITIONER

## 2018-02-15 PROCEDURE — 1159F MED LIST DOCD IN RCRD: CPT | Mod: S$GLB,,, | Performed by: NURSE PRACTITIONER

## 2018-02-15 PROCEDURE — 3008F BODY MASS INDEX DOCD: CPT | Mod: S$GLB,,, | Performed by: NURSE PRACTITIONER

## 2018-02-15 PROCEDURE — 1126F AMNT PAIN NOTED NONE PRSNT: CPT | Mod: S$GLB,,, | Performed by: NURSE PRACTITIONER

## 2018-02-15 PROCEDURE — 99999 PR PBB SHADOW E&M-EST. PATIENT-LVL V: CPT | Mod: PBBFAC,,, | Performed by: NURSE PRACTITIONER

## 2018-02-15 RX ORDER — INSULIN GLARGINE 100 [IU]/ML
30 INJECTION, SOLUTION SUBCUTANEOUS NIGHTLY
Qty: 3 BOX | Refills: 3 | Status: SHIPPED | OUTPATIENT
Start: 2018-02-15 | End: 2018-12-12 | Stop reason: SDUPTHER

## 2018-02-15 RX ORDER — PEN NEEDLE, DIABETIC 30 GX3/16"
NEEDLE, DISPOSABLE MISCELLANEOUS
Qty: 100 EACH | Refills: 3 | Status: SHIPPED | OUTPATIENT
Start: 2018-02-15 | End: 2019-10-15 | Stop reason: SDUPTHER

## 2018-02-15 NOTE — PROGRESS NOTES
"CC: Patient is here for management of Type 2 diabetes complicated by retinopathy and neuropathy and review of medical conditions as listed in visit diagnosis. She arrives alone today.      HPI:  Mrs. Myesha Quinones is a 77 y.o. White female who was diagnosed with Type 2 DM in 1992. She has a FH of diabetes, reporting her 3 children all have diabetes. No hospitalizations for DM.   Last year, she had difficulty affording DM regimen but was able to obtain the medication through patient assistance.     CURRENT DIABETIC MEDS: Lantus 40 units QHS, Tradjenta 5 mg daily, Glimepiride 2 mg bid, Metformin 500 mg with breakfast, 500 mg with lunch, 1,000 PM with dinner     No missed doses of diabetes medications.     Monitoring BG readings 1- 2x/day.   + hypoglycemia, upon waking, treats with breakfast       Good appetite. 3 meals daily. Snacks between meals on chips.     Active with yardwork.     Last Podiatry Exam: May 2017    REVIEW OF SYSTEMS  General: no weakness, + occasional fatigue; + weight loss.   Eyes: no double or blurred vision, eye pain, or redness; last eye exam= 1/2018 Dr. Young  Cardiovascular: no chest pain, palpitations, edema, or murmurs.   Respiratory: no cough or dyspnea.   GI: no heartburn, nausea, or changes in bowel patterns; good appetite.   Skin: no rashes, dryness, itching, or reactions at insulin injection sites.   Neuro: numbness, tingling in right fingers that is worst in the morning.   Endocrine: no polyuria, polydipsia, polyphagia, heat or cold intolerance.     Vital Signs  /78 (BP Location: Left arm, Patient Position: Sitting)   Pulse 69   Ht 5' 3" (1.6 m)   Wt 75.2 kg (165 lb 11.2 oz)   BMI 29.35 kg/m²     Hemoglobin A1C   Date Value Ref Range Status   02/08/2018 6.6 (H) 4.0 - 5.6 % Final     Comment:     According to ADA guidelines, hemoglobin A1c <7.0% represents  optimal control in non-pregnant diabetic patients. Different  metrics may apply to specific patient populations. "   Standards of Medical Care in Diabetes-2016.  For the purpose of screening for the presence of diabetes:  <5.7%     Consistent with the absence of diabetes  5.7-6.4%  Consistent with increasing risk for diabetes   (prediabetes)  >or=6.5%  Consistent with diabetes  Currently, no consensus exists for use of hemoglobin A1c  for diagnosis of diabetes for children.  This Hemoglobin A1c assay has significant interference with fetal   hemoglobin   (HbF). The results are invalid for patients with abnormal amounts of   HbF,   including those with known Hereditary Persistence   of Fetal Hemoglobin. Heterozygous hemoglobin variants (HbAS, HbAC,   HbAD, HbAE, HbA2) do not significantly interfere with this assay;   however, presence of multiple variants in a sample may impact the %   interference.     11/16/2017 7.2 (H) 4.0 - 5.6 % Final     Comment:     According to ADA guidelines, hemoglobin A1c <7.0% represents  optimal control in non-pregnant diabetic patients. Different  metrics may apply to specific patient populations.   Standards of Medical Care in Diabetes-2016.  For the purpose of screening for the presence of diabetes:  <5.7%     Consistent with the absence of diabetes  5.7-6.4%  Consistent with increasing risk for diabetes   (prediabetes)  >or=6.5%  Consistent with diabetes  Currently, no consensus exists for use of hemoglobin A1c  for diagnosis of diabetes for children.  This Hemoglobin A1c assay has significant interference with fetal   hemoglobin   (HbF). The results are invalid for patients with abnormal amounts of   HbF,   including those with known Hereditary Persistence   of Fetal Hemoglobin. Heterozygous hemoglobin variants (HbAS, HbAC,   HbAD, HbAE, HbA2) do not significantly interfere with this assay;   however, presence of multiple variants in a sample may impact the %   interference.     10/05/2017 7.6 (H) 4.0 - 5.6 % Final     Comment:     According to ADA guidelines, hemoglobin A1c <7.0%  represents  optimal control in non-pregnant diabetic patients. Different  metrics may apply to specific patient populations.   Standards of Medical Care in Diabetes-2016.  For the purpose of screening for the presence of diabetes:  <5.7%     Consistent with the absence of diabetes  5.7-6.4%  Consistent with increasing risk for diabetes   (prediabetes)  >or=6.5%  Consistent with diabetes  Currently, no consensus exists for use of hemoglobin A1c  for diagnosis of diabetes for children.  This Hemoglobin A1c assay has significant interference with fetal   hemoglobin   (HbF). The results are invalid for patients with abnormal amounts of   HbF,   including those with known Hereditary Persistence   of Fetal Hemoglobin. Heterozygous hemoglobin variants (HbAS, HbAC,   HbAD, HbAE, HbA2) do not significantly interfere with this assay;   however, presence of multiple variants in a sample may impact the %   interference.        Lab Results   Component Value Date    CREATININE 1.2 12/07/2017      Lab Results   Component Value Date    TSH 0.560 08/07/2017      Lab Results   Component Value Date    LDLCALC 39.8 (L) 05/11/2017      PHYSICAL EXAMINATION  Constitutional: Appears well, no distress  Neck: Supple, trachea midline.   Respiratory: CTA without wheezes, even and unlabored.  Cardiovascular: RRR; no carotid bruits or murmurs.   Lymph: no edema.   Skin: warm and dry; no injection site reactions, no acanthosis nigracans observed.  Neuro:patient alert and cooperative, normal affect.    Diabetes Foot Exam: as of 10/2017  Protective Sensation (w/ 10 gram monofilament):  Right: Intact  Left: Decreased    Visual Inspection:  Normal -  Bilateral    Pedal Pulses:   Right: Present  Left: Present    Posterior tibialis:   Right:Present  Left: Present     Assessment/Plan  Type 2 diabetes mellitus with stage 3 chronic kidney disease, retinopathy, neuropathy, with long-term current use of insulin with hypoglycemia Hemoglobin A1c  A1c below  goal given age and comorbidities.   Reviewed A1c and BG goals.     Decrease Lantus 30 units nightly. Discussed insulin's onset, peak, duration, storage, dosing, administration, site rotation.   - reviewed hypoglycemia, s/s and tx options.     Continue Glimepiride 2 mg once daily for prandial coverage. Discouraged from skipping meals.   Continue Tradjenta and current dose of Metformin    - Discussed DM diet, limiting carbs, portion size.     - takes ASA, statin, ARB   Diabetic polyneuropathy associated with type 2 diabetes mellitus  F/u with podiatry yearly  - not currently taking Rx  - maintain DM control   Chronic kidney disease, stage 3, mod decreased GFR  GFR 43, monitor renal fxn for metformin use  avoid hypoglycemia  Avoid SGLT2i  - on ARB therapy   Coronary artery disease involving native coronary artery of native heart without angina pectoris  avoid hypoglycemia  F/u with cardiology   Nonischemic cardiomyopathy  avoid hypoglycemia  F/u with cardiology   Chronic systolic heart failure  avoid hypoglycemia  F/u with cardiology   KHOA (obstructive sleep apnea)  Continue nightly c-pap use  Sleep Medicine appointment November 1, 2017   Essential hypertension  controlled, continue meds as previously prescribed and monitor     Pure hypercholesterolemia   LDL goal < 100  - controlled, LDL at goal, on moderate intensity statin with fish oil and Tricor, LFTs WNL         FOLLOW UP  Follow-up in about 5 months (around 7/15/2018).   Alternate appts with PCP Dr. Gaston

## 2018-02-16 ENCOUNTER — OFFICE VISIT (OUTPATIENT)
Dept: OTOLARYNGOLOGY | Facility: CLINIC | Age: 78
End: 2018-02-16
Payer: MEDICARE

## 2018-02-16 VITALS
DIASTOLIC BLOOD PRESSURE: 64 MMHG | HEART RATE: 89 BPM | BODY MASS INDEX: 29.64 KG/M2 | WEIGHT: 167.31 LBS | HEIGHT: 63 IN | SYSTOLIC BLOOD PRESSURE: 143 MMHG | TEMPERATURE: 98 F

## 2018-02-16 DIAGNOSIS — H83.8X2 SUPERIOR SEMICIRCULAR CANAL DEHISCENCE OF LEFT EAR: ICD-10-CM

## 2018-02-16 DIAGNOSIS — H65.492 OTHER CHRONIC NONSUPPURATIVE OTITIS MEDIA OF LEFT EAR: ICD-10-CM

## 2018-02-16 DIAGNOSIS — H69.92 ETD (EUSTACHIAN TUBE DYSFUNCTION), LEFT: ICD-10-CM

## 2018-02-16 DIAGNOSIS — H93.13 TINNITUS AURIUM, BILATERAL: ICD-10-CM

## 2018-02-16 DIAGNOSIS — H90.A12 CONDUCTIVE HEARING LOSS OF LEFT EAR WITH RESTRICTED HEARING OF RIGHT EAR: Primary | ICD-10-CM

## 2018-02-16 DIAGNOSIS — H90.A32 MIXED CONDUCTIVE AND SENSORINEURAL HEARING LOSS OF LEFT EAR WITH RESTRICTED HEARING OF RIGHT EAR: ICD-10-CM

## 2018-02-16 PROCEDURE — 3008F BODY MASS INDEX DOCD: CPT | Mod: S$GLB,,, | Performed by: OTOLARYNGOLOGY

## 2018-02-16 PROCEDURE — 99214 OFFICE O/P EST MOD 30 MIN: CPT | Mod: S$GLB,,, | Performed by: OTOLARYNGOLOGY

## 2018-02-16 PROCEDURE — 1126F AMNT PAIN NOTED NONE PRSNT: CPT | Mod: S$GLB,,, | Performed by: OTOLARYNGOLOGY

## 2018-02-16 PROCEDURE — 1159F MED LIST DOCD IN RCRD: CPT | Mod: S$GLB,,, | Performed by: OTOLARYNGOLOGY

## 2018-02-16 PROCEDURE — 99999 PR PBB SHADOW E&M-EST. PATIENT-LVL IV: CPT | Mod: PBBFAC,,, | Performed by: OTOLARYNGOLOGY

## 2018-02-16 NOTE — PROGRESS NOTES
"  Chief Complaint   Patient presents with    Follow-up     discuss CT results   .     HPI:  Myesha Quinones is a very pleasant 77 y.o. female here to see me today for the first time for evaluation of hearing loss.  She states that she has had lifelong ear problems.  She reports that as a child she pulled a cup of scalding hot coffee on and inside her left ear which resulted in an eardrum perforation. She states ever since this incident she "has never had normal hearing or ear."  She relays that in 1998 she had breast cancer and had hearing loss associated with the chemotherapy and obtained her first set of hearing aids in 1999.  She is here today to establish with ENT as her prior ENT Dr. Tanner no longer takes her insurance. She was referred for evaluation of her ears and for to evaluate for cerumen removal as her hearing aids have not been working as well recently.  She reports hearing loss that has been gradually progressing over the last several years.  She has not had any recent issues with ear pain or ear drainage.  She denies a family history of hearing loss, and has not had any previous otologic surgery.  She denies any history of significant loud noise exposure.She denies issues with dizziness.    Interval HPI:  She states her hearing seems about the same.  She still feels aural fullness in the right ear. She denies ear pain. She feels her hearing is stable.  No otorrhea. She continues on Astelin and Claritin but  does not feel a major improvement in her aural fullness or hearing.          Past Medical History:   Diagnosis Date    Allergy     Asthma     Basal cell carcinoma     left forehead    Basal cell carcinoma     left nose    Basal cell carcinoma 05/20/2015    right nose    Basal cell carcinoma 12/22/2015    left lower post neck    CAD (coronary artery disease)     Cardiomyopathy     Cardiomyopathy, ischemic     Cataract     CHF (congestive heart failure)     Chronic kidney " disease, stage 3, mod decreased GFR 2/14/2017    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, with long-term current use of insulin 2/22/2018    COPD (chronic obstructive pulmonary disease)     Defibrillator discharge     Diabetes mellitus     Diabetes mellitus type II     Diabetes with neurologic complications     Goiter     MNG    HX: breast cancer     Hyperlipidemia     Hypertension     Iron deficiency anemia 5/16/2017    Left kidney mass     Meningioma     Osteoporosis, postmenopausal     Pacemaker     Skin cancer     s/p excision    Sleep apnea     CPAP    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/2015    mid forehead    Unspecified vitamin D deficiency     Ventricular tachycardia     Vitamin B12 deficiency     Vitamin D deficiency disease      Social History     Social History    Marital status:      Spouse name: N/A    Number of children: N/A    Years of education: N/A     Occupational History    Homemaker      Other     Social History Main Topics    Smoking status: Never Smoker    Smokeless tobacco: Never Used    Alcohol use No    Drug use: No    Sexual activity: Yes     Partners: Male     Other Topics Concern    Are You Pregnant Or Think You May Be? 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    FRACTURE SURGERY      left elbow and wrist as a child    HYSTERECTOMY      MASTECTOMY      right    SQUAMOUS CELL CARCINOMA EXCISION      remved from forehead    TONSILLECTOMY       Family History   Problem Relation Age of Onset    Diabetes Father     Heart disease  Father     Diabetes Sister     Heart disease Sister     Diabetes Brother     Heart disease Brother     Hypertension Brother     Diabetes Brother     Heart disease Brother     Hypertension Brother     Diabetes Brother     Heart disease Brother     Cancer Brother      colon    Diabetes Brother     Cancer Son      skin    Diabetes Son      prediabetes    Diabetes Daughter      prediabetes    Cancer Daughter      melanoma    Obesity Daughter     Melanoma Daughter     Diabetes Son     Asthma Mother     Hypertension Mother     Stroke Mother     No Known Problems Maternal Grandmother     No Known Problems Maternal Grandfather     No Known Problems Paternal Grandmother     No Known Problems Paternal Grandfather     Amblyopia Neg Hx     Blindness Neg Hx     Cataracts Neg Hx     Glaucoma Neg Hx     Macular degeneration Neg Hx     Retinal detachment Neg Hx     Strabismus Neg Hx     Thyroid disease Neg Hx          Review of Systems  General: negative for chills, fever or weight loss  Psychological: negative for mood changes or depression  Ophthalmic: negative for blurry vision, photophobia or eye pain  ENT: see HPI  Respiratory: no cough, shortness of breath, or wheezing  Cardiovascular: no chest pain or dyspnea on exertion  Gastrointestinal: no abdominal pain, change in bowel habits, or black/ bloody stools  Musculoskeletal: negative for gait disturbance or muscular weakness  Neurological: no syncope or seizures; no ataxia  Dermatological: negative for puritis,  rash and jaundice  Hematologic/lymphatic: no easy bruising, no new lumps or bumps      Physical Exam:    Vitals:    02/16/18 1424   BP: (!) 143/64   Pulse: 89   Temp: 98.2 °F (36.8 °C)       Constitutional: Well appearing / communicating without difficutly.  NAD.  Eyes: EOM I Bilaterally  Head/Face: Normocephalic.  Negative paranasal sinus pressure/tenderness.  Salivary glands WNL.  House Brackmann I Bilaterally.    Right Ear: Auricle  normal appearance. External Auditory Canal within normal limits no lesions or masses,TM w/o masses/lesions/perforationsp; monomeric segment centrally.  TM mobility noted.   Left Ear: Auricle normal appearance. External Auditory Canal within normal limits no lesions or masses,TM w/ thickened with tympanosclerosis present. TM mobility reduced.   Rinne Air conduction >bone conduction bilaterally, Herrera midline.   Nose: No gross nasal septal deviation. Inferior Turbinates 3+ bilaterally. No septal perforation. No masses/lesions. External nasal skin appears normal without masses/lesions.  Oral Cavity: Gingiva/lips within normal limits.  Dentition/gingiva healthy appearing. Mucus membranes moist. Floor of mouth soft, no masses palpated. Oral Tongue mobile. Hard Palate appears normal.    Oropharynx: Base of tongue appears normal. No masses/lesions noted. Tonsillar fossa/pharyngeal wall without lesions. Posterior oropharynx WNL.  Soft palate without masses. Midline uvula.   Neck/Lymphatic: No LAD I-VI bilaterally.  No thyromegaly.  No masses noted on exam.    Mirror laryngoscopy/nasopharyngoscopy: Active gag reflex.  Unable to perform.    Neuro/Psychiatric: AOx3.  Normal mood and affect.   Cardiovascular: Normal carotid pulses bilaterally, no increasing jugular venous distention noted at cervical region bilaterally.    Respiratory: Normal respiratory effort, no stridor, no retractions noted.    Diagnostic testing ordered and reviewed:    CT temporal bones 1/31/2018:   Mild soft tissue thickening of the left tympanic membrane.  No middle ear/ mastoid involvement.    Mild dehiscence of the left superior semicircular canal. Right temporal bone unremarkable.      Assessment:    ICD-10-CM ICD-9-CM    1. Conductive hearing loss of left ear with restricted hearing of right ear H90.A12 389.22    2. Tinnitus aurium, bilateral H93.13 388.31    3. 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 Other  chronic nonsuppurative otitis media of left ear H65.492 381.3    5. Mixed conductive and sensorineural hearing loss of left ear with restricted hearing of right ear H90.A32 389.22    6. ETD (Eustachian tube dysfunction), left H69.82 381.81      The primary encounter diagnosis was Conductive hearing loss of left ear with restricted hearing of right ear. 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      Plan:  No orders of the defined types were placed in this encounter.    Discussed with patient that her hearing seems to be stable from 2013. Her CT scan findings were discussed in detail with the explanation of semicircular canal dehiscence and how this affects the hearing.  I discussed options for treatment including continuing conservative measure of amplification via hearing aids versus surgical management of semicircular canal dehiscence. We discussed the risk/benefits and overall expected outcomes of each option. The patient wishes to continue with hearing aids.   I recommend continuing Astelin and adding Claritin daily.      30 minutes total time was spent with the patient with over 50% of the time spent counseling regarding above.     Brittany Metcalf MD

## 2018-02-19 ENCOUNTER — LAB VISIT (OUTPATIENT)
Dept: LAB | Facility: HOSPITAL | Age: 78
End: 2018-02-19
Attending: INTERNAL MEDICINE
Payer: MEDICARE

## 2018-02-19 ENCOUNTER — OFFICE VISIT (OUTPATIENT)
Dept: INTERNAL MEDICINE | Facility: CLINIC | Age: 78
End: 2018-02-19
Payer: MEDICARE

## 2018-02-19 VITALS
SYSTOLIC BLOOD PRESSURE: 124 MMHG | HEART RATE: 81 BPM | BODY MASS INDEX: 29.77 KG/M2 | HEIGHT: 63 IN | OXYGEN SATURATION: 97 % | WEIGHT: 168 LBS | DIASTOLIC BLOOD PRESSURE: 78 MMHG

## 2018-02-19 DIAGNOSIS — E11.22 TYPE 2 DIABETES MELLITUS WITH STAGE 3 CHRONIC KIDNEY DISEASE, WITH LONG-TERM CURRENT USE OF INSULIN: Primary | ICD-10-CM

## 2018-02-19 DIAGNOSIS — N18.30 TYPE 2 DIABETES MELLITUS WITH STAGE 3 CHRONIC KIDNEY DISEASE, WITH LONG-TERM CURRENT USE OF INSULIN: Primary | ICD-10-CM

## 2018-02-19 DIAGNOSIS — D50.9 IRON DEFICIENCY ANEMIA, UNSPECIFIED IRON DEFICIENCY ANEMIA TYPE: ICD-10-CM

## 2018-02-19 DIAGNOSIS — E78.00 PURE HYPERCHOLESTEROLEMIA: ICD-10-CM

## 2018-02-19 DIAGNOSIS — M81.0 POSTMENOPAUSAL BONE LOSS: ICD-10-CM

## 2018-02-19 DIAGNOSIS — E11.22 TYPE 2 DIABETES MELLITUS WITH STAGE 3 CHRONIC KIDNEY DISEASE, WITH LONG-TERM CURRENT USE OF INSULIN: ICD-10-CM

## 2018-02-19 DIAGNOSIS — N18.30 CHRONIC KIDNEY DISEASE, STAGE 3, MOD DECREASED GFR: ICD-10-CM

## 2018-02-19 DIAGNOSIS — E27.8 ADRENAL CORTICAL NODULE: ICD-10-CM

## 2018-02-19 DIAGNOSIS — Z79.4 TYPE 2 DIABETES MELLITUS WITH STAGE 3 CHRONIC KIDNEY DISEASE, WITH LONG-TERM CURRENT USE OF INSULIN: ICD-10-CM

## 2018-02-19 DIAGNOSIS — Z79.4 TYPE 2 DIABETES MELLITUS WITH STAGE 3 CHRONIC KIDNEY DISEASE, WITH LONG-TERM CURRENT USE OF INSULIN: Primary | ICD-10-CM

## 2018-02-19 DIAGNOSIS — G62.0 CHEMOTHERAPY-INDUCED NEUROPATHY: ICD-10-CM

## 2018-02-19 DIAGNOSIS — I70.0 CALCIFICATION OF AORTA: ICD-10-CM

## 2018-02-19 DIAGNOSIS — D32.9 MENINGIOMA: ICD-10-CM

## 2018-02-19 DIAGNOSIS — E04.1 THYROID NODULE: ICD-10-CM

## 2018-02-19 DIAGNOSIS — I10 ESSENTIAL HYPERTENSION: ICD-10-CM

## 2018-02-19 DIAGNOSIS — T45.1X5A CHEMOTHERAPY-INDUCED NEUROPATHY: ICD-10-CM

## 2018-02-19 DIAGNOSIS — N18.30 TYPE 2 DIABETES MELLITUS WITH STAGE 3 CHRONIC KIDNEY DISEASE, WITH LONG-TERM CURRENT USE OF INSULIN: ICD-10-CM

## 2018-02-19 LAB
ALBUMIN SERPL BCP-MCNC: 3.8 G/DL
ALP SERPL-CCNC: 80 U/L
ALT SERPL W/O P-5'-P-CCNC: 28 U/L
ANION GAP SERPL CALC-SCNC: 10 MMOL/L
AST SERPL-CCNC: 23 U/L
BASOPHILS # BLD AUTO: 0.06 K/UL
BASOPHILS NFR BLD: 0.8 %
BILIRUB SERPL-MCNC: 0.2 MG/DL
BUN SERPL-MCNC: 30 MG/DL
CALCIUM SERPL-MCNC: 9.4 MG/DL
CHLORIDE SERPL-SCNC: 101 MMOL/L
CHOLEST SERPL-MCNC: 125 MG/DL
CHOLEST/HDLC SERPL: 2.5 {RATIO}
CO2 SERPL-SCNC: 24 MMOL/L
CREAT SERPL-MCNC: 1.3 MG/DL
DIFFERENTIAL METHOD: ABNORMAL
EOSINOPHIL # BLD AUTO: 0.9 K/UL
EOSINOPHIL NFR BLD: 11.8 %
ERYTHROCYTE [DISTWIDTH] IN BLOOD BY AUTOMATED COUNT: 12.9 %
EST. GFR  (AFRICAN AMERICAN): 45 ML/MIN/1.73 M^2
EST. GFR  (NON AFRICAN AMERICAN): 39 ML/MIN/1.73 M^2
GLUCOSE SERPL-MCNC: 259 MG/DL
HCT VFR BLD AUTO: 33 %
HDLC SERPL-MCNC: 50 MG/DL
HDLC SERPL: 40 %
HGB BLD-MCNC: 10.4 G/DL
IRON SERPL-MCNC: 58 UG/DL
LDLC SERPL CALC-MCNC: 46.6 MG/DL
LYMPHOCYTES # BLD AUTO: 1.7 K/UL
LYMPHOCYTES NFR BLD: 22 %
MCH RBC QN AUTO: 29.3 PG
MCHC RBC AUTO-ENTMCNC: 31.5 G/DL
MCV RBC AUTO: 93 FL
MONOCYTES # BLD AUTO: 0.6 K/UL
MONOCYTES NFR BLD: 8 %
NEUTROPHILS # BLD AUTO: 4.3 K/UL
NEUTROPHILS NFR BLD: 56.7 %
NONHDLC SERPL-MCNC: 75 MG/DL
NRBC BLD-RTO: 0 /100 WBC
PLATELET # BLD AUTO: 212 K/UL
PMV BLD AUTO: 11.4 FL
POTASSIUM SERPL-SCNC: 5.1 MMOL/L
PROT SERPL-MCNC: 7.2 G/DL
RBC # BLD AUTO: 3.55 M/UL
SATURATED IRON: 12 %
SODIUM SERPL-SCNC: 135 MMOL/L
TOTAL IRON BINDING CAPACITY: 499 UG/DL
TRANSFERRIN SERPL-MCNC: 337 MG/DL
TRIGL SERPL-MCNC: 142 MG/DL
WBC # BLD AUTO: 7.62 K/UL

## 2018-02-19 PROCEDURE — 99214 OFFICE O/P EST MOD 30 MIN: CPT | Mod: S$GLB,,, | Performed by: INTERNAL MEDICINE

## 2018-02-19 PROCEDURE — 83540 ASSAY OF IRON: CPT

## 2018-02-19 PROCEDURE — 80061 LIPID PANEL: CPT

## 2018-02-19 PROCEDURE — 85025 COMPLETE CBC W/AUTO DIFF WBC: CPT

## 2018-02-19 PROCEDURE — 99499 UNLISTED E&M SERVICE: CPT | Mod: S$GLB,,, | Performed by: INTERNAL MEDICINE

## 2018-02-19 PROCEDURE — 1126F AMNT PAIN NOTED NONE PRSNT: CPT | Mod: S$GLB,,, | Performed by: INTERNAL MEDICINE

## 2018-02-19 PROCEDURE — 1159F MED LIST DOCD IN RCRD: CPT | Mod: S$GLB,,, | Performed by: INTERNAL MEDICINE

## 2018-02-19 PROCEDURE — 36415 COLL VENOUS BLD VENIPUNCTURE: CPT

## 2018-02-19 PROCEDURE — 80053 COMPREHEN METABOLIC PANEL: CPT

## 2018-02-19 PROCEDURE — 99999 PR PBB SHADOW E&M-EST. PATIENT-LVL III: CPT | Mod: PBBFAC,,, | Performed by: INTERNAL MEDICINE

## 2018-02-19 PROCEDURE — 3008F BODY MASS INDEX DOCD: CPT | Mod: S$GLB,,, | Performed by: INTERNAL MEDICINE

## 2018-02-19 RX ORDER — ALBUTEROL SULFATE 90 UG/1
2 AEROSOL, METERED RESPIRATORY (INHALATION) EVERY 6 HOURS PRN
Qty: 1 INHALER | Refills: 12 | Status: SHIPPED | OUTPATIENT
Start: 2018-02-19 | End: 2019-08-08 | Stop reason: SDUPTHER

## 2018-02-19 RX ORDER — SERTRALINE HYDROCHLORIDE 25 MG/1
25 TABLET, FILM COATED ORAL NIGHTLY
Qty: 30 TABLET | Refills: 6 | Status: SHIPPED | OUTPATIENT
Start: 2018-02-19 | End: 2018-06-26

## 2018-02-19 NOTE — PATIENT INSTRUCTIONS
Procedures Ordered This Visit     CBC auto differential         Comprehensive metabolic panel         DXA Bone Density Spine And Hip         Iron and TIBC         Lipid panel             Ultrasound of the thyroid prior to the next appt

## 2018-02-19 NOTE — PROGRESS NOTES
Subjective:      Patient ID: Myesha Quinones is a 77 y.o. female.    Chief Complaint: Follow-up    HPI:  HPI   Patient is here for follow up of medical problems. I have reviewed the consultants notes and labs. She has no new complaints. Doing well on sertraline.    Diabetes Management Status    Statin: Taking  ACE/ARB: Taking    Screening or Prevention Patient's value Goal Complete/Controlled?   HgA1C Testing and Control   Lab Results   Component Value Date    HGBA1C 6.6 (H) 02/08/2018      Annually/Less than 8% Yes   Lipid profile : 05/11/2017 Annually Yes   LDL control Lab Results   Component Value Date    LDLCALC 39.8 (L) 05/11/2017    Annually/Less than 100 mg/dl  Yes   Nephropathy screening Lab Results   Component Value Date    LABMICR 110.0 05/23/2017     Lab Results   Component Value Date    PROTEINUA 1+ (A) 11/07/2016    Annually Yes   Blood pressure BP Readings from Last 1 Encounters:   02/19/18 124/78    Less than 140/90 Yes   Dilated retinal exam : 01/25/2018 Annually Yes   Foot exam   : 10/12/2017 Annually Yes       Patient Active Problem List   Diagnosis    History of nonmelanoma skin cancer    Nonischemic cardiomyopathy    LBBB (left bundle branch block)    Chronic systolic heart failure    Nontoxic multinodular goiter    Meningioma    Asthma, chronic    Biventricular ICD (implantable cardioverter-defibrillator) in place    Coronary artery disease involving native coronary artery without angina pectoris - Non-obstructive 50% LAD    Essential hypertension    Hyperlipidemia    History of breast cancer    Adrenal cortical nodule: repeat CT 6/2016    Calcification of aorta    Diabetic polyneuropathy associated with type 2 diabetes mellitus    Osteoporosis    KHOA (obstructive sleep apnea)    Type 2 diabetes mellitus with stage 3 chronic kidney disease, with long-term current use of insulin    Chronic kidney disease, stage 3, mod decreased GFR    Iron deficiency anemia    Thyroid  nodule    Chemotherapy-induced neuropathy    Hypomagnesemia     Past Medical History:   Diagnosis Date    Allergy     Asthma     Basal cell carcinoma     left forehead    Basal cell carcinoma     left nose    Basal cell carcinoma 05/20/2015    right nose    Basal cell carcinoma 12/22/2015    left lower post neck    CAD (coronary artery disease)     Cardiomyopathy     Cardiomyopathy, ischemic     Cataract     CHF (congestive heart failure)     Chronic kidney disease, stage 3, mod decreased GFR 2/14/2017    COPD (chronic obstructive pulmonary disease)     Defibrillator discharge     Diabetes mellitus     Diabetes mellitus type II     Diabetes with neurologic complications     Goiter     MNG    HX: breast cancer     Hyperlipidemia     Hypertension     Iron deficiency anemia 5/16/2017    Left kidney mass     Meningioma     Osteoporosis, postmenopausal     Pacemaker     Skin cancer     s/p excision    Sleep apnea     CPAP    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/2015    mid forehead    Unspecified vitamin D deficiency     Ventricular tachycardia     Vitamin B12 deficiency     Vitamin D deficiency disease      Past Surgical History:   Procedure Laterality Date    BASAL CELL CARCINOMA EXCISION      posterior neck and nose    BREAST SURGERY      CARDIAC DEFIBRILLATOR PLACEMENT      x 2    CATARACT EXTRACTION W/  INTRAOCULAR LENS IMPLANT Bilateral     CHOLECYSTECTOMY      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    FRACTURE SURGERY      left elbow and wrist as a child    HYSTERECTOMY      MASTECTOMY      right    SQUAMOUS CELL CARCINOMA EXCISION      remved from forehead    TONSILLECTOMY       Family History   Problem Relation Age of Onset    Diabetes Father     Heart disease Father     Diabetes Sister     Heart disease Sister     Diabetes Brother     Heart disease Brother     Hypertension Brother     Diabetes Brother     Heart disease Brother     Hypertension Brother      "Diabetes Brother     Heart disease Brother     Cancer Brother      colon    Diabetes Brother     Cancer Son      skin    Diabetes Son      prediabetes    Diabetes Daughter      prediabetes    Cancer Daughter      melanoma    Obesity Daughter     Melanoma Daughter     Diabetes Son     Asthma Mother     Hypertension Mother     Stroke Mother     No Known Problems Maternal Grandmother     No Known Problems Maternal Grandfather     No Known Problems Paternal Grandmother     No Known Problems Paternal Grandfather     Amblyopia Neg Hx     Blindness Neg Hx     Cataracts Neg Hx     Glaucoma Neg Hx     Macular degeneration Neg Hx     Retinal detachment Neg Hx     Strabismus Neg Hx     Thyroid disease Neg Hx      Review of Systems   Constitutional: Negative for chills, fever and unexpected weight change.   HENT: Negative for trouble swallowing.    Respiratory: Negative for cough, shortness of breath and wheezing.    Cardiovascular: Negative for chest pain and palpitations.   Gastrointestinal: Negative for abdominal distention, abdominal pain, blood in stool and vomiting.   Musculoskeletal: Negative for back pain.     Objective:     Vitals:    02/19/18 0713   BP: 124/78   Pulse: 81   SpO2: 97%   Weight: 76.2 kg (167 lb 15.9 oz)   Height: 5' 3" (1.6 m)   PainSc: 0-No pain     Body mass index is 29.76 kg/m².  Physical Exam   Constitutional: She is oriented to person, place, and time. She appears well-developed and well-nourished. No distress.   Neck: Carotid bruit is not present. No thyromegaly present.   Cardiovascular: Normal rate, regular rhythm and normal heart sounds.  PMI is not displaced.    Pulmonary/Chest: Effort normal and breath sounds normal. No respiratory distress.   Abdominal: Soft. Bowel sounds are normal. She exhibits no distension. There is no tenderness.   Musculoskeletal: She exhibits no edema.   Neurological: She is alert and oriented to person, place, and time.     Assessment:     1. " Type 2 diabetes mellitus with stage 3 chronic kidney disease, with long-term current use of insulin    2. Postmenopausal bone loss    3. Adrenal cortical nodule: repeat CT 6/2016    4. Meningioma    5. Chemotherapy-induced neuropathy    6. Calcification of aorta    7. Chronic kidney disease, stage 3, mod decreased GFR    8. Essential hypertension    9. Iron deficiency anemia, unspecified iron deficiency anemia type    10. Pure hypercholesterolemia    11. Thyroid nodule      Plan:   Myesha was seen today for follow-up.    Diagnoses and all orders for this visit:    Type 2 diabetes mellitus with stage 3 chronic kidney disease, with long-term current use of insulin: lab recently done but need to keep a close check on renal function  -     CBC auto differential; Future  -     Comprehensive metabolic panel; Future    Postmenopausal bone loss  -     DXA Bone Density Spine And Hip; Future    Adrenal cortical nodule: repeat CT 6/2016: stable will consider recheck    Meningioma: asymptomatic    Chemotherapy-induced neuropathy: stable    Calcification of aorta: on a asa and statin    Chronic kidney disease, stage 3, mod decreased GFR: on current medications will check    Essential hypertension: good control on current meds and on hypertension program    Iron deficiency anemia, unspecified iron deficiency anemia type  -     Iron and TIBC; Future    Pure hypercholesterolemia  -     Lipid panel; Future    Thyroid nodule  -     US Soft Tissue Head Neck Thyroid; Future      Follow-up in about 3 months (around 5/19/2018).     Medication List          Accurate as of 2/19/18  8:36 AM. If you have any questions, ask your nurse or doctor.               CHANGE how you take these medications    blood sugar diagnostic Strp  Commonly known as:  ACCU-CHEK HECTOR  Uses Accu-Check Hector meter to test BG 4x/day  What changed:  additional instructions     primidone 50 MG Tab  Commonly known as:  MYSOLINE  Take as directed.  What  changed:  · how much to take  · how to take this  · when to take this  · additional instructions     sertraline 25 MG tablet  Commonly known as:  ZOLOFT  Take 1 tablet (25 mg total) by mouth every evening.  What changed:  how much to take        CONTINUE taking these medications    ACCU-CHEK HECTOR CONTROL SOLN Soln  Generic drug:  blood glucose control high,low     albuterol 2.5 mg /3 mL (0.083 %) nebulizer solution  Commonly known as:  PROVENTIL  Take 3 mLs (2.5 mg total) by nebulization every 6 (six) hours as needed for Wheezing.     azelastine 137 mcg (0.1 %) nasal spray  Commonly known as:  ASTELIN  1 spray (137 mcg total) by Nasal route 2 (two) times daily. For severe allergy     B-12 DOTS ORAL     * blood-glucose meter kit  Dispense Accu-Chek Hector meter. Use as instructed     * ACCU-CHEK HECTOR PLUS METER Misc  Generic drug:  blood-glucose meter     carvedilol 25 MG tablet  Commonly known as:  COREG  Take 2 tablets (50 mg total) by mouth 2 (two) times daily.     chlorthalidone 25 MG Tab  Commonly known as:  HYGROTEN  TAKE 1 TABLET (25 MG TOTAL) BY MOUTH ONCE DAILY.     ENTERIC COATED ASPIRIN 81 MG EC tablet  Generic drug:  aspirin     FISH  mg Cap  Generic drug:  omega-3 fatty acids     fluticasone-salmeterol 250-50 mcg/dose 250-50 mcg/dose diskus inhaler  Commonly known as:  ADVAIR  Inhale 1 puff into the lungs 2 (two) times daily. This is a change from one month     glimepiride 2 MG tablet  Commonly known as:  AMARYL  TAKE 1 TABLET BY MOUTH daily     hydrALAZINE 100 MG tablet  Commonly known as:  APRESOLINE  Take 1 tablet (100 mg total) by mouth 3 (three) times daily.     insulin glargine 100 unit/mL (3 mL) Inpn pen  Commonly known as:  LANTUS SOLOSTAR U-100 INSULIN  Inject 30 Units into the skin every evening.     * lancets Misc  Commonly known as:  ACCU-CHEK SOFTCLIX LANCETS  Uses Accu-Chek Hector meter to test BG 4x/day     * lancets 30 gauge Misc     lancing device Misc     linagliptin 5 mg Tab  "tablet  Commonly known as:  TRADJENTA  Take 1 tablet (5 mg total) by mouth once daily.     magnesium oxide 400 mg tablet  Commonly known as:  MAG-OX  Take 1 tablet (400 mg total) by mouth once daily.     metFORMIN 500 MG tablet  Commonly known as:  GLUCOPHAGE  TAKE 2 TABLETS (1,000 MG TOTAL) BY MOUTH 2 (TWO) TIMES DAILY WITH MEALS.     nitroGLYCERIN 0.4 MG SL tablet  Commonly known as:  NITROSTAT     pen needle, diabetic 31 gauge x 3/16" Ndle  Commonly known as:  BD INSULIN PEN NEEDLE UF MINI  USE WITH LANTUS AT BEDTIME     simvastatin 20 MG tablet  Commonly known as:  ZOCOR  TAKE 1 TABLET (20 MG TOTAL) BY MOUTH EVERY EVENING.     valsartan 320 MG tablet  Commonly known as:  DIOVAN  Take 1 tablet (320 mg total) by mouth once daily.        * This list has 4 medication(s) that are the same as other medications prescribed for you. Read the directions carefully, and ask your doctor or other care provider to review them with you.                "

## 2018-02-20 ENCOUNTER — PATIENT OUTREACH (OUTPATIENT)
Dept: OTHER | Facility: OTHER | Age: 78
End: 2018-02-20

## 2018-02-20 NOTE — PROGRESS NOTES
"Last 5 Patient Entered Readings                                      Current 30 Day Average: 151/69     Recent Readings 3/2/2018 2/28/2018 2/27/2018 2/26/2018 2/23/2018    SBP (mmHg) 161 168 161 162 162    DBP (mmHg) 70 76 76 80 80    Pulse 83 73 72 75 75          Hypertension Digital Medicine Program (HDMP): Health  Follow Up    Lifestyle Modifications:    1.Low sodium diet: yes Reports she continues to read food labels and try to stay under 2000mg sodium per day.  Notes this is something she continues to be mindful of as she does not feel comfortable exercising.  States she is currently not feeling well, and feels this is what has caused increased blood pressure due to increased stress, difficulty breathing, lack of sleep, and coughing. Encouraged patient to continue doing what she is able, reinforcing low sodium.     2.Physical activity: no Has not been active due to bronchitis and asthma per patient report.     3.Hypotension/Hypertension symptoms: yes Reporting fatigue and occasional headaches and "wooziness", however is unsure if it is related to blood pressure or bronchitis.     4.Patient has been compliant with the medication regimen. Reports taking Coricidin to help manage symptoms for bronchitis. States she is unsure if changing timing of medication to PM has been helpful as she has not been feeling well as a whole.     Follow up with Mrs. Myesha LAURA Joni completed. No further questions or concerns. I will follow up in a few weeks to assess progress. BP is not at goal.             "

## 2018-02-22 ENCOUNTER — INITIAL CONSULT (OUTPATIENT)
Dept: OPHTHALMOLOGY | Facility: CLINIC | Age: 78
End: 2018-02-22
Payer: COMMERCIAL

## 2018-02-22 VITALS — DIASTOLIC BLOOD PRESSURE: 67 MMHG | SYSTOLIC BLOOD PRESSURE: 145 MMHG | HEART RATE: 88 BPM

## 2018-02-22 DIAGNOSIS — E11.3213 CONTROLLED TYPE 2 DIABETES MELLITUS WITH BOTH EYES AFFECTED BY MILD NONPROLIFERATIVE RETINOPATHY AND MACULAR EDEMA, WITH LONG-TERM CURRENT USE OF INSULIN: Primary | ICD-10-CM

## 2018-02-22 DIAGNOSIS — H35.033 HYPERTENSIVE RETINOPATHY, BILATERAL: ICD-10-CM

## 2018-02-22 DIAGNOSIS — Z79.4 CONTROLLED TYPE 2 DIABETES MELLITUS WITH BOTH EYES AFFECTED BY MILD NONPROLIFERATIVE RETINOPATHY AND MACULAR EDEMA, WITH LONG-TERM CURRENT USE OF INSULIN: Primary | ICD-10-CM

## 2018-02-22 PROCEDURE — 67028 INJECTION EYE DRUG: CPT | Mod: LT,S$GLB,, | Performed by: OPHTHALMOLOGY

## 2018-02-22 PROCEDURE — 99499 UNLISTED E&M SERVICE: CPT | Mod: S$GLB,,, | Performed by: OPHTHALMOLOGY

## 2018-02-22 PROCEDURE — 99999 PR PBB SHADOW E&M-EST. PATIENT-LVL III: CPT | Mod: PBBFAC,,, | Performed by: OPHTHALMOLOGY

## 2018-02-22 PROCEDURE — 92014 COMPRE OPH EXAM EST PT 1/>: CPT | Mod: 25,S$GLB,, | Performed by: OPHTHALMOLOGY

## 2018-02-22 RX ADMIN — Medication 1.25 MG: at 02:02

## 2018-02-22 NOTE — PATIENT INSTRUCTIONS

## 2018-02-22 NOTE — PROGRESS NOTES
HPI     Eye Problem    Additional comments: consult per Hector Barrientos   Patient started with bleed behind OS in October. She has been seeing Dr Young. When she went for her 3 mos checked it had not cleared and was   told a little worse. Vision OS is foggy. BS have been running good    LBS=90's yesterday       Last edited by Dilma Galeano on 2/22/2018  1:10 PM. (History)        OCT - OD No ME  OS - central ME      A/P    1. 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 DME OS    Avastin OS    3. HTN Ret OU  BS/BP/chol control    4. PCIOL OU  With small PCO      1 month OCT/FA OS    Risks, benefits, and alternatives to treatment discussed in detail with the patient.  The patient voiced understanding and wished to proceed with the procedure    Injection Procedure Note:  Diagnosis: DME OS    Topical Proparacaine and Betadine. Conj Prep only  Inject Avastin OS at 6:00 @ 3.5-4mm posterior to limbus  Post Operative Dx: Same  Complications: None  Follow up as above.

## 2018-02-22 NOTE — LETTER
February 22, 2018      Boaz Young, OD  [address]  [place] [address]           [place] [facility]  [address] [facility]  [place] [address]  Phone: [phone]  Fax: [phone]          Patient: Myesha Quinones   MR Number: [id number]   YOB: 1940   Date of Visit: 2/22/2018       Dear Dr. Boaz Young:    Thank you for referring Myesha Quinones to me for evaluation. Attached you will find relevant portions of my assessment and plan of care.    If you have questions, please do not hesitate to call me. I look forward to following Myesha Quinones along with you.    Sincerely,    CARRIE Arechiga MD    Enclosure  CC:  No Recipients    If you would like to receive this communication electronically, please contact [email or web address] or [phone] to request more information on [facility] access.    For providers and/or their staff who would like to refer a patient to [facility], please contact us through our one-stop-shop provider referral line, [facility], at [phone].    If you feel you have received this communication in error or would no longer like to receive these types of communications, please e-mail [email or web address]

## 2018-02-23 ENCOUNTER — TELEPHONE (OUTPATIENT)
Dept: INTERNAL MEDICINE | Facility: CLINIC | Age: 78
End: 2018-02-23

## 2018-02-23 NOTE — TELEPHONE ENCOUNTER
Spoke with  Rigo, he stated Mrs. Stevenson was not available but he will let her know about labs and that Dr. Gaston will let her know what she wants to do about her iron level.

## 2018-02-23 NOTE — TELEPHONE ENCOUNTER
Please tell her that her labs are good but she is still iron deficient and I am deciding what I want to do about it.

## 2018-02-26 ENCOUNTER — CLINICAL SUPPORT (OUTPATIENT)
Dept: ELECTROPHYSIOLOGY | Facility: CLINIC | Age: 78
End: 2018-02-26
Attending: INTERNAL MEDICINE
Payer: MEDICARE

## 2018-02-26 DIAGNOSIS — I44.7 LBBB (LEFT BUNDLE BRANCH BLOCK): ICD-10-CM

## 2018-02-26 DIAGNOSIS — I42.8 NONISCHEMIC CARDIOMYOPATHY: ICD-10-CM

## 2018-02-26 DIAGNOSIS — Z95.810 AICD (AUTOMATIC CARDIOVERTER/DEFIBRILLATOR) PRESENT: ICD-10-CM

## 2018-02-26 DIAGNOSIS — I50.9 CHF (CONGESTIVE HEART FAILURE): ICD-10-CM

## 2018-02-26 PROCEDURE — 93296 REM INTERROG EVL PM/IDS: CPT | Mod: S$GLB,,, | Performed by: INTERNAL MEDICINE

## 2018-02-26 PROCEDURE — 93297 REM INTERROG DEV EVAL ICPMS: CPT | Mod: S$GLB,,, | Performed by: INTERNAL MEDICINE

## 2018-02-26 PROCEDURE — 93295 DEV INTERROG REMOTE 1/2/MLT: CPT | Mod: S$GLB,,, | Performed by: INTERNAL MEDICINE

## 2018-03-09 ENCOUNTER — OFFICE VISIT (OUTPATIENT)
Dept: NEUROLOGY | Facility: CLINIC | Age: 78
End: 2018-03-09
Payer: MEDICARE

## 2018-03-09 VITALS
DIASTOLIC BLOOD PRESSURE: 74 MMHG | SYSTOLIC BLOOD PRESSURE: 166 MMHG | HEIGHT: 63 IN | WEIGHT: 164.88 LBS | HEART RATE: 84 BPM | BODY MASS INDEX: 29.21 KG/M2

## 2018-03-09 DIAGNOSIS — G25.0 ESSENTIAL TREMOR: Primary | ICD-10-CM

## 2018-03-09 PROCEDURE — 3077F SYST BP >= 140 MM HG: CPT | Mod: S$GLB,,, | Performed by: PSYCHIATRY & NEUROLOGY

## 2018-03-09 PROCEDURE — 99999 PR PBB SHADOW E&M-EST. PATIENT-LVL III: CPT | Mod: PBBFAC,,, | Performed by: PSYCHIATRY & NEUROLOGY

## 2018-03-09 PROCEDURE — 99213 OFFICE O/P EST LOW 20 MIN: CPT | Mod: S$GLB,,, | Performed by: PSYCHIATRY & NEUROLOGY

## 2018-03-09 PROCEDURE — 3078F DIAST BP <80 MM HG: CPT | Mod: S$GLB,,, | Performed by: PSYCHIATRY & NEUROLOGY

## 2018-03-09 PROCEDURE — 99499 UNLISTED E&M SERVICE: CPT | Mod: S$GLB,,, | Performed by: PSYCHIATRY & NEUROLOGY

## 2018-03-21 ENCOUNTER — PATIENT OUTREACH (OUTPATIENT)
Dept: OTHER | Facility: OTHER | Age: 78
End: 2018-03-21

## 2018-03-21 ENCOUNTER — PES CALL (OUTPATIENT)
Dept: ADMINISTRATIVE | Facility: CLINIC | Age: 78
End: 2018-03-21

## 2018-03-21 DIAGNOSIS — I10 ESSENTIAL HYPERTENSION: Primary | ICD-10-CM

## 2018-03-21 RX ORDER — NIFEDIPINE 30 MG/1
30 TABLET, FILM COATED, EXTENDED RELEASE ORAL DAILY
Qty: 30 TABLET | Refills: 3 | Status: SHIPPED | OUTPATIENT
Start: 2018-03-21 | End: 2018-04-30 | Stop reason: SDUPTHER

## 2018-03-21 NOTE — PROGRESS NOTES
Last 5 Patient Entered Readings                                      Current 30 Day Average: 161/73     Recent Readings 3/21/2018 3/16/2018 3/15/2018 3/15/2018 3/13/2018    SBP (mmHg) 158 153 176 175 162    DBP (mmHg) 62 68 81 97 78    Pulse 73 72 82 80 75        Ms. Joni's BP remains elevated. She is not active, so encouraged her to try walking 15-30 minutes each day to get more physical activity. She does monitor salt intake and tries to keep it less than 2000 mg/day. She is currently on maximum doses of all antihypertensives prescribed. She could not tolerate spironolactone due to increased potassium. Amlodipine did cause swelling, but will try nifedipine to see if she tolerates it.     Patient denies s/s of hypertension (SOB, CP, severe headaches, changes in vision) associated with high readings. Instructed patient to go to the ED if BP > 180/110 and accompanied by hypertensive s/s, patient confirms understanding.    Will continue to monitor regularly. Will follow up in 2-3 weeks, sooner if BP begins to trend upward or downward.    Patient has my contact information and knows to call with any concerns or clinical changes.     Current HTN regimen:  Hypertension Medications             carvedilol (COREG) 25 MG tablet Take 2 tablets (50 mg total) by mouth 2 (two) times daily.    chlorthalidone (HYGROTEN) 25 MG Tab TAKE 1 TABLET (25 MG TOTAL) BY MOUTH ONCE DAILY.    hydrALAZINE (APRESOLINE) 100 MG tablet Take 1 tablet (100 mg total) by mouth 3 (three) times daily.    NIFEdipine (ADALAT CC) 30 MG TbSR Take 1 tablet (30 mg total) by mouth once daily.    nitroGLYCERIN (NITROSTAT) 0.4 MG SL tablet Place 1 tablet under the tongue continuous prn.      valsartan (DIOVAN) 320 MG tablet Take 1 tablet (320 mg total) by mouth once daily.

## 2018-03-22 ENCOUNTER — PROCEDURE VISIT (OUTPATIENT)
Dept: OPHTHALMOLOGY | Facility: CLINIC | Age: 78
End: 2018-03-22
Payer: MEDICARE

## 2018-03-22 VITALS — SYSTOLIC BLOOD PRESSURE: 146 MMHG | HEART RATE: 92 BPM | DIASTOLIC BLOOD PRESSURE: 67 MMHG

## 2018-03-22 DIAGNOSIS — H35.033 HYPERTENSIVE RETINOPATHY, BILATERAL: ICD-10-CM

## 2018-03-22 DIAGNOSIS — Z79.4 CONTROLLED TYPE 2 DIABETES MELLITUS WITH BOTH EYES AFFECTED BY MILD NONPROLIFERATIVE RETINOPATHY AND MACULAR EDEMA, WITH LONG-TERM CURRENT USE OF INSULIN: Primary | ICD-10-CM

## 2018-03-22 DIAGNOSIS — I10 ESSENTIAL HYPERTENSION: ICD-10-CM

## 2018-03-22 DIAGNOSIS — E11.3213 CONTROLLED TYPE 2 DIABETES MELLITUS WITH BOTH EYES AFFECTED BY MILD NONPROLIFERATIVE RETINOPATHY AND MACULAR EDEMA, WITH LONG-TERM CURRENT USE OF INSULIN: Primary | ICD-10-CM

## 2018-03-22 DIAGNOSIS — E11.42 DIABETIC POLYNEUROPATHY ASSOCIATED WITH TYPE 2 DIABETES MELLITUS: ICD-10-CM

## 2018-03-22 PROCEDURE — 92235 FLUORESCEIN ANGRPH MLTIFRAME: CPT | Mod: S$GLB,,, | Performed by: OPHTHALMOLOGY

## 2018-03-22 PROCEDURE — 92134 CPTRZ OPH DX IMG PST SGM RTA: CPT | Mod: S$GLB,,, | Performed by: OPHTHALMOLOGY

## 2018-03-22 PROCEDURE — 99499 UNLISTED E&M SERVICE: CPT | Mod: S$GLB,,, | Performed by: OPHTHALMOLOGY

## 2018-03-22 PROCEDURE — 67028 INJECTION EYE DRUG: CPT | Mod: LT,S$GLB,, | Performed by: OPHTHALMOLOGY

## 2018-03-22 RX ORDER — GLIMEPIRIDE 2 MG/1
TABLET ORAL
Qty: 180 TABLET | Refills: 2 | OUTPATIENT
Start: 2018-03-22

## 2018-03-22 RX ORDER — HYDRALAZINE HYDROCHLORIDE 100 MG/1
100 TABLET, FILM COATED ORAL 3 TIMES DAILY
Qty: 90 TABLET | Refills: 6 | Status: SHIPPED | OUTPATIENT
Start: 2018-03-22 | End: 2018-11-08 | Stop reason: SDUPTHER

## 2018-03-22 RX ADMIN — Medication 1.25 MG: at 04:03

## 2018-03-22 NOTE — PROGRESS NOTES
"HPI     Eye Problem    Additional comments: 1 month check           Comments   DLS 2/22/18- eyes have been watering a lot lately      HPI     Eye Problem    Additional comments: consult per Hector CASSIDY           Comments   Patient started with bleed behind OS in October. She has been seeing Dr Young. When she went for her 3 mos checked it had not cleared and was   told a little worse. Vision OS is foggy. BS have been running good    LBS=90's yesterday       Last edited by Dilma Galeano on 2/22/2018  1:10 PM. (History)        OCT - OD No ME  OS - central ME - mproving    FA - Foveal MA's OS   No NV of NP OU      A/P    1. 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 DME OS  S/p Avastin OS x 1  Foveal MA"s OS - may need chronic pharmacotherapy    Avastin OS    3. HTN Ret OU  BS/BP/chol control    4. PCIOL OU  With small PCO      1 month OCT    Risks, benefits, and alternatives to treatment discussed in detail with the patient.  The patient voiced understanding and wished to proceed with the procedure    Injection Procedure Note:  Diagnosis: DME OS    Topical Proparacaine and Betadine. Conj Prep only  Inject Avastin OS at 6:00 @ 3.5-4mm posterior to limbus  Post Operative Dx: Same  Complications: None  Follow up as above.    "

## 2018-03-22 NOTE — PATIENT INSTRUCTIONS
Fluorescein Angiography     A fluorescein angiogram of the retina   Fluorescein angiography is an eye test. It is done to look at the back of your eye, including:  · The blood vessels in your eye  · The layer of tissue at the back of your eye (the retina)  · The center of your retina (the macula)  · The optic nerve  This test can diagnose diseases found in these areas. It can also diagnose other conditions that affect these areas. To do this test, a dye called fluorescein is shot (injected) into your arm. The dye goes into your bloodstream and up into the blood vessels in your eyes. A special camera is then used to take images (angiograms) of your eyes.  Getting ready for your test  Tell your healthcare provider if you:  · Are pregnant or think you may be pregnant  · Are breastfeeding  · Have a history of severe allergic reactions, including to X-ray dye or other medicines  · Have kidney problems  Tell your provider about any medicines you are taking. You may need to stop taking all or some of these before the test. This includes:  · All prescription medicines  · Over-the-counter medicines such as aspirin or ibuprofen  · Street drugs  · Herbs, vitamins, and other supplements  You should arrange for an adult family member or friend to drive you home after your test. Your vision will be blurry for up to 12 hours.  Follow any other instructions from your healthcare provider.  During your test  · You are given eye drops to enlarge (dilate) your pupils.  · You then sit in front of a special camera. You place your chin on the chin rest and look into the camera.  · Images are taken of your eyes, one eye at a time.  · Fluorescein dye is then injected into your arm. The lights in the room are turned off. You may have mild nausea. You may have a warm feeling in your arm or upper body. Tell your provider if your skin feels itchy or if you are having trouble breathing. If so, you could be having an allergic reaction to the  dye.  · More pictures of your eyes are taken over 15 to 30 minutes. The camera shines a bright light into your eyes. Try to keep your head still and your eyes open.  · When enough images have been taken, the test is over.  After your test  Your vision will be blurry for up to 4 to 12 hours. This is because of your dilated pupils. Your eye will be more sensitive to light for up to 12 hours. You may want to wear sunglasses during this time. Do not drive if your vision is very blurry. You may also find it uncomfortable to read. Your skin may look yellow for a few hours. This is from the dye. Your urine will be bright yellow or orange for 24 to 48 hours after the test.     Risks and possible complications  All procedures have some risks. Possible risks of fluorescein angiography include:  · Upset stomach (nausea) and vomiting  · Leaking dye around the injection site that causes pain and swelling  · Metallic taste in your mouth  · Infection at injection site  · Allergic reaction to the dye  · Dry mouth or too much saliva  · Faster heart rate  · Sweating  · Lower back pain   Date Last Reviewed: 5/30/2015  © 4031-9777 [employer]. [address] [place] [address]. All rights reserved. This information is not intended as a substitute for professional medical care. Always follow your healthcare professional's instructions.    .POST INTRAVITREAL INJECTION PATIENT INSTRUCTIONS   Below are some guidelines for what to expect after your treatment and additional care instructions.   * you may experience mild discomfort in your eye after the treatment. Your eye usually feels better within 24 to 48 hours after the procedure.   * you have been given drops that numb the surface of your eye.   DO NOT rub or touch your eye, DO NOT wear contacts until numbness goes away.   * you may experience small spots that appear in your field of vision, these are usually caused by an air bubble or from the medication. It taked  a few hours or days for these to go away.   * use of sunglasses will help reduce light sensitivity and glare.   * DO NOT swim or put sink water ( tap water ) or swin for at least 24 hours after the injection   * If you have a gritty, burning, irritating or stinging feeling in the injected eye. This may be a result of the antiseptic used. Use artifical tears or eye lubricant ( from over- the- counter from your local pharmacy ). If you have some at home already please check the expiration date, so not to use anything contaminated in your eye. A cool pack over your eye brow above the injected eye may also relieve discomfort.   Call us right away or go to the Emergency Department if you have a dramatic decrease in vision or extreme pain in the eye.   [facility] [phone]

## 2018-04-04 ENCOUNTER — PATIENT OUTREACH (OUTPATIENT)
Dept: OTHER | Facility: OTHER | Age: 78
End: 2018-04-04

## 2018-04-04 ENCOUNTER — OFFICE VISIT (OUTPATIENT)
Dept: INTERNAL MEDICINE | Facility: CLINIC | Age: 78
End: 2018-04-04
Payer: MEDICARE

## 2018-04-04 VITALS
HEIGHT: 63 IN | DIASTOLIC BLOOD PRESSURE: 68 MMHG | HEART RATE: 80 BPM | SYSTOLIC BLOOD PRESSURE: 144 MMHG | BODY MASS INDEX: 29.39 KG/M2 | WEIGHT: 165.88 LBS

## 2018-04-04 DIAGNOSIS — I50.22 CHRONIC SYSTOLIC HEART FAILURE: ICD-10-CM

## 2018-04-04 DIAGNOSIS — E27.8 ADRENAL CORTICAL NODULE: ICD-10-CM

## 2018-04-04 DIAGNOSIS — I10 ESSENTIAL HYPERTENSION: ICD-10-CM

## 2018-04-04 DIAGNOSIS — Z85.828 HISTORY OF NONMELANOMA SKIN CANCER: ICD-10-CM

## 2018-04-04 DIAGNOSIS — E04.2 NONTOXIC MULTINODULAR GOITER: ICD-10-CM

## 2018-04-04 DIAGNOSIS — D32.9 MENINGIOMA: ICD-10-CM

## 2018-04-04 DIAGNOSIS — M81.0 OSTEOPOROSIS, UNSPECIFIED OSTEOPOROSIS TYPE, UNSPECIFIED PATHOLOGICAL FRACTURE PRESENCE: ICD-10-CM

## 2018-04-04 DIAGNOSIS — Z79.4 TYPE 2 DIABETES MELLITUS WITH STAGE 3 CHRONIC KIDNEY DISEASE, WITH LONG-TERM CURRENT USE OF INSULIN: ICD-10-CM

## 2018-04-04 DIAGNOSIS — Z85.3 HISTORY OF BREAST CANCER: ICD-10-CM

## 2018-04-04 DIAGNOSIS — G62.0 CHEMOTHERAPY-INDUCED NEUROPATHY: ICD-10-CM

## 2018-04-04 DIAGNOSIS — G47.33 OSA (OBSTRUCTIVE SLEEP APNEA): ICD-10-CM

## 2018-04-04 DIAGNOSIS — H90.8 MIXED CONDUCTIVE AND SENSORINEURAL HEARING LOSS, UNSPECIFIED LATERALITY: ICD-10-CM

## 2018-04-04 DIAGNOSIS — R25.1 TREMOR: ICD-10-CM

## 2018-04-04 DIAGNOSIS — I42.8 NONISCHEMIC CARDIOMYOPATHY: ICD-10-CM

## 2018-04-04 DIAGNOSIS — J84.10 POSTINFLAMMATORY PULMONARY FIBROSIS: ICD-10-CM

## 2018-04-04 DIAGNOSIS — Z79.4 CONTROLLED TYPE 2 DIABETES MELLITUS WITH BOTH EYES AFFECTED BY MILD NONPROLIFERATIVE RETINOPATHY AND MACULAR EDEMA, WITH LONG-TERM CURRENT USE OF INSULIN: ICD-10-CM

## 2018-04-04 DIAGNOSIS — T45.1X5A CHEMOTHERAPY-INDUCED NEUROPATHY: ICD-10-CM

## 2018-04-04 DIAGNOSIS — N18.30 CHRONIC KIDNEY DISEASE, STAGE 3, MOD DECREASED GFR: ICD-10-CM

## 2018-04-04 DIAGNOSIS — J44.9 CHRONIC OBSTRUCTIVE PULMONARY DISEASE, UNSPECIFIED COPD TYPE: ICD-10-CM

## 2018-04-04 DIAGNOSIS — E11.42 DIABETIC POLYNEUROPATHY ASSOCIATED WITH TYPE 2 DIABETES MELLITUS: ICD-10-CM

## 2018-04-04 DIAGNOSIS — I25.10 CORONARY ARTERY DISEASE INVOLVING NATIVE CORONARY ARTERY OF NATIVE HEART WITHOUT ANGINA PECTORIS: ICD-10-CM

## 2018-04-04 DIAGNOSIS — Z00.00 ENCOUNTER FOR PREVENTIVE HEALTH EXAMINATION: Primary | ICD-10-CM

## 2018-04-04 DIAGNOSIS — H35.033 HYPERTENSIVE RETINOPATHY, BILATERAL: ICD-10-CM

## 2018-04-04 DIAGNOSIS — E78.00 PURE HYPERCHOLESTEROLEMIA: ICD-10-CM

## 2018-04-04 DIAGNOSIS — E11.22 TYPE 2 DIABETES MELLITUS WITH STAGE 3 CHRONIC KIDNEY DISEASE, WITH LONG-TERM CURRENT USE OF INSULIN: ICD-10-CM

## 2018-04-04 DIAGNOSIS — J45.909 CHRONIC ASTHMA WITHOUT COMPLICATION, UNSPECIFIED ASTHMA SEVERITY, UNSPECIFIED WHETHER PERSISTENT: ICD-10-CM

## 2018-04-04 DIAGNOSIS — N18.30 TYPE 2 DIABETES MELLITUS WITH STAGE 3 CHRONIC KIDNEY DISEASE, WITH LONG-TERM CURRENT USE OF INSULIN: ICD-10-CM

## 2018-04-04 DIAGNOSIS — R91.8 MULTIPLE LUNG NODULES: ICD-10-CM

## 2018-04-04 DIAGNOSIS — Z95.810 BIVENTRICULAR ICD (IMPLANTABLE CARDIOVERTER-DEFIBRILLATOR) IN PLACE: ICD-10-CM

## 2018-04-04 DIAGNOSIS — E11.3213 CONTROLLED TYPE 2 DIABETES MELLITUS WITH BOTH EYES AFFECTED BY MILD NONPROLIFERATIVE RETINOPATHY AND MACULAR EDEMA, WITH LONG-TERM CURRENT USE OF INSULIN: ICD-10-CM

## 2018-04-04 DIAGNOSIS — I70.0 CALCIFICATION OF AORTA: ICD-10-CM

## 2018-04-04 DIAGNOSIS — I44.7 LBBB (LEFT BUNDLE BRANCH BLOCK): ICD-10-CM

## 2018-04-04 PROCEDURE — 99499 UNLISTED E&M SERVICE: CPT | Mod: S$GLB,,, | Performed by: NURSE PRACTITIONER

## 2018-04-04 PROCEDURE — 99999 PR PBB SHADOW E&M-EST. PATIENT-LVL V: CPT | Mod: PBBFAC,,, | Performed by: NURSE PRACTITIONER

## 2018-04-04 PROCEDURE — G0439 PPPS, SUBSEQ VISIT: HCPCS | Mod: S$GLB,,, | Performed by: NURSE PRACTITIONER

## 2018-04-04 NOTE — PROGRESS NOTES
Last 5 Patient Entered Readings                                      Current 30 Day Average: 161/72     Recent Readings 4/12/2018 4/11/2018 4/11/2018 4/7/2018 4/6/2018    SBP (mmHg) 146 184 198 161 171    DBP (mmHg) 63 80 88 77 77    Pulse 82 85 88 70 74        Ms. Quinones was hospitalized for a COPD exacerbation over the weekend. She is feeling better now. She is taking antibiotics and steroids which are causing her BP to be more elevated. She denies any FANNIE since starting nifedipine 30 mg QD. Will not increase her dose at this time. She does have a follow up visit with Dr. Fernando on 4/30. Will follow up with her after this visit.     Patient's BP average is above goal of <130/80.     Patient denies s/s of hypertension (SOB, CP, severe headaches, changes in vision) associated with high readings. Instructed patient to go to the ED if BP > 180/110 and accompanied by hypertensive s/s, patient confirms understanding.    Will continue to monitor regularly. Will follow up in 2-3 weeks, sooner if BP begins to trend upward or downward.    Patient has my contact information and knows to call with any concerns or clinical changes.     Current HTN regimen:  Hypertension Medications             carvedilol (COREG) 25 MG tablet Take 2 tablets (50 mg total) by mouth 2 (two) times daily.    chlorthalidone (HYGROTEN) 25 MG Tab TAKE 1 TABLET (25 MG TOTAL) BY MOUTH ONCE DAILY.    hydrALAZINE (APRESOLINE) 100 MG tablet TAKE 1 TABLET (100 MG TOTAL) BY MOUTH 3 (THREE) TIMES DAILY.    NIFEdipine (ADALAT CC) 30 MG TbSR Take 1 tablet (30 mg total) by mouth once daily.    nitroGLYCERIN (NITROSTAT) 0.4 MG SL tablet Place 1 tablet under the tongue continuous prn.      valsartan (DIOVAN) 320 MG tablet Take 1 tablet (320 mg total) by mouth once daily.

## 2018-04-04 NOTE — PATIENT INSTRUCTIONS
Counseling and Referral of Other Preventative  (Italic type indicates deductible and co-insurance are waived)    Patient Name: Myesha Quinones  Today's Date: 4/4/2018    Health Maintenance       Date Due Completion Date    DEXA SCAN 12/02/2017 12/2/2015    Hemoglobin A1c 08/08/2018 2/8/2018    Foot Exam 10/12/2018 10/12/2017    Override on 6/4/2015: Done    Lipid Panel 02/19/2019 2/19/2018    Eye Exam 02/22/2019 2/22/2018    Colonoscopy 08/27/2019 8/27/2014    TETANUS VACCINE 07/22/2024 7/22/2014        No orders of the defined types were placed in this encounter.    The following information is provided to all patients.  This information is to help you find resources for any of the problems found today that may be affecting your health:                Living healthy guide: www.[facility].[place].gov      Understanding Diabetes: www.diabetes.org      Eating healthy: www.cdc.gov/healthyweight      CDC home safety checklist: www.cdc.gov/steadi/patient.html      Agency on Aging: www.goea.[place].gov      Alcoholics anonymous (AA): www.aa.org      Physical Activity: www.oanh.nih.gov/hc0kawy      Tobacco use: www.quitwithusla.org

## 2018-04-04 NOTE — PROGRESS NOTES
"Myesha Quinones presented for a  Medicare AWV and comprehensive Health Risk Assessment today. The following components were reviewed and updated:    · Medical history  · Family History  · Social history  · Allergies and Current Medications  · Health Risk Assessment  · Health Maintenance  · Care Team     ** See Completed Assessments for Annual Wellness Visit within the encounter summary.**       The following assessments were completed:  · Living Situation  · CAGE  · Depression Screening  · Timed Get Up and Go  · Whisper Test  · Cognitive Function Screening  · Nutrition Screening  · ADL Screening  · PAQ Screening            Vitals:    04/04/18 1014   BP: (!) 144/68   BP Location: Left arm   Patient Position: Sitting   Pulse: 80   Weight: 75.3 kg (165 lb 14.4 oz)   Height: 5' 3" (1.6 m)     Body mass index is 29.39 kg/m².     Physical Exam   Constitutional: She is oriented to person, place, and time. She appears well-developed. No distress.   HENT:   Head: Normocephalic and atraumatic.   Right Ear: Decreased hearing is noted.   Left Ear: Decreased hearing is noted.   Cardiovascular: Normal rate, regular rhythm and intact distal pulses.    Pulmonary/Chest: Effort normal and breath sounds normal. No respiratory distress. She has no wheezes. She has no rales.   Musculoskeletal: She exhibits no edema, tenderness or deformity.   Neurological: She is alert and oriented to person, place, and time.   Skin: Skin is warm and dry. She is not diaphoretic.   Psychiatric: She has a normal mood and affect. Her behavior is normal. She expresses no homicidal and no suicidal ideation. She expresses no suicidal plans and no homicidal plans.   Vitals reviewed.        Diagnoses and health risks identified today and associated recommendations/orders:    1. Encounter for preventive health examination  Health Maintenance reviewed.  Shingrix discussed.  Dexa scan and mammogram scheduled.    2. Chronic obstructive pulmonary disease, " unspecified COPD type  Stable.   Continue current medications.  Followed by Pulmonology.     3. Chronic systolic heart failure  Stable.   Continue current medications.  Followed by Cardiology.     4. Adrenal cortical nodule: repeat CT 6/2016  Stable.   Followed by PCP.     5. Nonischemic cardiomyopathy  Stable.   Continue current medications.  Followed by Cardiology.     6. Meningioma  Stable.   Followed by PCP.     7. Calcification of aorta  Stable.   Noted on imaging 8/25/2010.  Followed by Cardiology.     8. Chemotherapy-induced neuropathy  Stable.   Followed by Podiatry.     9. Diabetic polyneuropathy associated with type 2 diabetes mellitus  Stable.   Followed by Podiatry.     10. Type 2 diabetes mellitus with stage 3 chronic kidney disease, with long-term current use of insulin  Stable.   Continue current medications.  Followed by Endocrinology.     11. 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, with long-term current use of insulin  Stable.   Continue current medications.  Followed by Endocrinology.     12. Hypertensive retinopathy, bilateral  Stable.   Followed by Optometry.     13. History of nonmelanoma skin cancer  Stable.   Followed by Derm.     14. LBBB (left bundle branch block)  Stable.   Followed by Cardiology.     15. Nontoxic multinodular goiter  Stable.   Followed by Endocrinology.     16. Chronic asthma without complication, unspecified asthma severity, unspecified whether persistent  Stable.   Continue current medications.  Followed by Pulmonology.     17. Biventricular ICD (implantable cardioverter-defibrillator) in place  Stable.   Followed by Cardiology.     18. Coronary artery disease involving native coronary artery of native heart without angina pectoris  Stable.   Continue current medications.  Followed by Cardiology.     19. Essential hypertension  Slightly elevated. Asymptomatic.  Patient reports that she took BP  medication right before visit.  Continue current medications.  Followed by Cardiology and PCP.     20. Pure hypercholesterolemia  Stable.   Continue current medication.  Followed by Cardiology and PCP.     21. History of breast cancer  Stable.   Followed by Hem/Onc.     22. Osteoporosis, unspecified osteoporosis type, unspecified pathological fracture presence  Stable.   Dexa Scan scheduled.  Followed by Endocrinology.     23. KHOA (obstructive sleep apnea)  Stable.   Followed by Sleep Medicine.     24. Chronic kidney disease, stage 3, mod decreased GFR  Stable.   Followed by PCP.     25. Multiple lung nodules  Stable.   Noted on imaging 12/7/2017.  Followed by PCP.     26. Mixed conductive and sensorineural hearing loss, unspecified laterality  Stable.   Followed by ENT.     27. Tremor  Stable.   Continue current medication.  Followed by Neurology.     28. Postinflammatory pulmonary fibrosis  Stable.   Followed by Pulmonology.     Provided Myesha with a 5-10 year written screening schedule and personal prevention plan. Recommendations were developed using the USPSTF age appropriate recommendations. Education, counseling, and referrals were provided as needed. After Visit Summary printed and given to patient which includes a list of additional screenings\tests needed.    Follow-up in about 7 weeks (around 5/21/2018) for PCP visit. Return in 1 year for AWV.    Rubi Payton NP

## 2018-04-04 NOTE — TELEPHONE ENCOUNTER
----- Message from Kaylyn Wright sent at 4/4/2018  3:44 PM CDT -----  Contact: Pt  [phone]  Pt needs an refill on medication glimepiride (AMARYL) 2 MG tablet called into [facility]   [phone], call back to verify

## 2018-04-05 ENCOUNTER — PATIENT OUTREACH (OUTPATIENT)
Dept: OTHER | Facility: OTHER | Age: 78
End: 2018-04-05

## 2018-04-05 DIAGNOSIS — E11.42 DIABETIC POLYNEUROPATHY ASSOCIATED WITH TYPE 2 DIABETES MELLITUS: ICD-10-CM

## 2018-04-05 RX ORDER — GLIMEPIRIDE 2 MG/1
TABLET ORAL
Qty: 180 TABLET | Refills: 2 | Status: SHIPPED | OUTPATIENT
Start: 2018-04-05 | End: 2018-07-16 | Stop reason: SDUPTHER

## 2018-04-05 RX ORDER — GLIMEPIRIDE 2 MG/1
TABLET ORAL
Qty: 180 TABLET | Refills: 2 | Status: ON HOLD | OUTPATIENT
Start: 2018-04-05 | End: 2018-04-09 | Stop reason: HOSPADM

## 2018-04-05 NOTE — PROGRESS NOTES
Last 5 Patient Entered Readings                                      Current 30 Day Average: 161/72     Recent Readings 4/5/2018 4/4/2018 4/4/2018 4/4/2018 4/2/2018    SBP (mmHg) 168 182 188 176 124    DBP (mmHg) 73 76 82 75 62    Pulse 70 86 87 84 70        Hypertension Digital Medicine Program (HDMP): Health  Follow Up    Called to check in after repeated elevated blood pressure readings. Patient denies s/sx of HTN however endorses increased pain, stress, and lack of sleep the past few days. Reports feeling nervous when taking blood pressure readings and expresses concerns about blood pressure cuff accuracy. Encouraged to take blood pressure cuff into next doctor's appointment, April 11 so she can go to [place].     Lifestyle Modifications:    1.Low sodium diet: yes Reports she continues to read food labels and tries to be very mindful of food choices to help with blood pressure and diabetes management. Patient is eligible for DDMP and will want to discuss this at next call. Praised for sodium monitoring.     2.Physical activity: yes States she has been trying to walk and move around more frequently, noting they went for a walk this past week in the park and walked around at her daughter's house. Notes she cannot walk more the 3 minutes without feeling uncomfortable, so she walks and takes breaks. Praised for integrating activity and encouraged her to continue being active and listening to her body.     3.Hypotension/Hypertension symptoms: no Denies s/sx associated with elevated readings. Reporting hand pain from neuropathy; intends to talk with PCP at next appt. Patient states she has taken Tylenol but it was not helpful.     4.Patient has been compliant with the medication regimen.     Follow up with Mrs. Myesha LAURA Joni completed. No further questions or concerns. I will follow up in a few weeks to assess progress. Bp is not at goal.

## 2018-04-05 NOTE — TELEPHONE ENCOUNTER
----- Message from Zo Hylton sent at 4/5/2018 11:35 AM CDT -----  Contact: Self  Pt needs refill glimepiride (AMARYL) 2 MG tablet, has been asking for over week, she is out.      .  [id number] - [place] - [address] AT [facility]  [address] [address]  Phone: [phone] Fax: [phone]

## 2018-04-08 ENCOUNTER — HOSPITAL ENCOUNTER (OUTPATIENT)
Facility: HOSPITAL | Age: 78
Discharge: HOME | End: 2018-04-09
Attending: EMERGENCY MEDICINE | Admitting: HOSPITALIST
Payer: MEDICARE

## 2018-04-08 DIAGNOSIS — J44.9 CHRONIC OBSTRUCTIVE PULMONARY DISEASE, UNSPECIFIED COPD TYPE: ICD-10-CM

## 2018-04-08 DIAGNOSIS — J44.1 COPD EXACERBATION: Primary | ICD-10-CM

## 2018-04-08 DIAGNOSIS — R06.02 SHORTNESS OF BREATH: ICD-10-CM

## 2018-04-08 LAB
ALBUMIN SERPL BCP-MCNC: 3.6 G/DL
ALP SERPL-CCNC: 74 U/L
ALT SERPL W/O P-5'-P-CCNC: 21 U/L
ANION GAP SERPL CALC-SCNC: 10 MMOL/L
AST SERPL-CCNC: 20 U/L
BACTERIA #/AREA URNS AUTO: NORMAL /HPF
BASOPHILS # BLD AUTO: 0.07 K/UL
BASOPHILS NFR BLD: 0.7 %
BILIRUB SERPL-MCNC: 0.2 MG/DL
BILIRUB UR QL STRIP: NEGATIVE
BNP SERPL-MCNC: 156 PG/ML
BUN SERPL-MCNC: 22 MG/DL
CALCIUM SERPL-MCNC: 9.9 MG/DL
CHLORIDE SERPL-SCNC: 100 MMOL/L
CLARITY UR REFRACT.AUTO: CLEAR
CO2 SERPL-SCNC: 26 MMOL/L
COLOR UR AUTO: ABNORMAL
CREAT SERPL-MCNC: 1 MG/DL
DIFFERENTIAL METHOD: ABNORMAL
EOSINOPHIL # BLD AUTO: 0.6 K/UL
EOSINOPHIL NFR BLD: 6 %
ERYTHROCYTE [DISTWIDTH] IN BLOOD BY AUTOMATED COUNT: 13.1 %
EST. GFR  (AFRICAN AMERICAN): >60 ML/MIN/1.73 M^2
EST. GFR  (NON AFRICAN AMERICAN): 54.1 ML/MIN/1.73 M^2
ESTIMATED AVG GLUCOSE: 146 MG/DL
GLUCOSE SERPL-MCNC: 70 MG/DL
GLUCOSE UR QL STRIP: NEGATIVE
HBA1C MFR BLD HPLC: 6.7 %
HCT VFR BLD AUTO: 35 %
HGB BLD-MCNC: 11.2 G/DL
HGB UR QL STRIP: NEGATIVE
HYALINE CASTS UR QL AUTO: 0 /LPF
IMM GRANULOCYTES # BLD AUTO: 0.07 K/UL
IMM GRANULOCYTES NFR BLD AUTO: 0.7 %
INR PPP: 1
KETONES UR QL STRIP: NEGATIVE
LEUKOCYTE ESTERASE UR QL STRIP: ABNORMAL
LYMPHOCYTES # BLD AUTO: 1.4 K/UL
LYMPHOCYTES NFR BLD: 14.8 %
MAGNESIUM SERPL-MCNC: 1.8 MG/DL
MCH RBC QN AUTO: 29.2 PG
MCHC RBC AUTO-ENTMCNC: 32 G/DL
MCV RBC AUTO: 91 FL
MICROSCOPIC COMMENT: NORMAL
MONOCYTES # BLD AUTO: 0.6 K/UL
MONOCYTES NFR BLD: 6.6 %
NEUTROPHILS # BLD AUTO: 7 K/UL
NEUTROPHILS NFR BLD: 71.2 %
NITRITE UR QL STRIP: NEGATIVE
NRBC BLD-RTO: 0 /100 WBC
PH UR STRIP: 7 [PH] (ref 5–8)
PHOSPHATE SERPL-MCNC: 3.1 MG/DL
PLATELET # BLD AUTO: 210 K/UL
PMV BLD AUTO: 11.1 FL
POCT GLUCOSE: 148 MG/DL (ref 70–110)
POCT GLUCOSE: 262 MG/DL (ref 70–110)
POCT GLUCOSE: 292 MG/DL (ref 70–110)
POCT GLUCOSE: 70 MG/DL (ref 70–110)
POTASSIUM SERPL-SCNC: 3.9 MMOL/L
PROT SERPL-MCNC: 7.1 G/DL
PROT UR QL STRIP: ABNORMAL
PROTHROMBIN TIME: 10.6 SEC
RBC # BLD AUTO: 3.84 M/UL
RBC #/AREA URNS AUTO: 0 /HPF (ref 0–4)
SODIUM SERPL-SCNC: 136 MMOL/L
SP GR UR STRIP: 1 (ref 1–1.03)
SQUAMOUS #/AREA URNS AUTO: 1 /HPF
TROPONIN I SERPL DL<=0.01 NG/ML-MCNC: 0.02 NG/ML
URN SPEC COLLECT METH UR: ABNORMAL
UROBILINOGEN UR STRIP-ACNC: NEGATIVE EU/DL
WBC # BLD AUTO: 9.76 K/UL
WBC #/AREA URNS AUTO: 3 /HPF (ref 0–5)

## 2018-04-08 PROCEDURE — 99220 PR INITIAL OBSERVATION CARE,LEVL III: CPT | Mod: ,,, | Performed by: PHYSICIAN ASSISTANT

## 2018-04-08 PROCEDURE — 83735 ASSAY OF MAGNESIUM: CPT

## 2018-04-08 PROCEDURE — 82962 GLUCOSE BLOOD TEST: CPT

## 2018-04-08 PROCEDURE — 84484 ASSAY OF TROPONIN QUANT: CPT

## 2018-04-08 PROCEDURE — 80053 COMPREHEN METABOLIC PANEL: CPT

## 2018-04-08 PROCEDURE — 25000003 PHARM REV CODE 250: Performed by: PHYSICIAN ASSISTANT

## 2018-04-08 PROCEDURE — 94640 AIRWAY INHALATION TREATMENT: CPT

## 2018-04-08 PROCEDURE — 81001 URINALYSIS AUTO W/SCOPE: CPT

## 2018-04-08 PROCEDURE — 27000221 HC OXYGEN, UP TO 24 HOURS

## 2018-04-08 PROCEDURE — 63600175 PHARM REV CODE 636 W HCPCS: Performed by: PHYSICIAN ASSISTANT

## 2018-04-08 PROCEDURE — G0378 HOSPITAL OBSERVATION PER HR: HCPCS

## 2018-04-08 PROCEDURE — 94761 N-INVAS EAR/PLS OXIMETRY MLT: CPT

## 2018-04-08 PROCEDURE — 25000242 PHARM REV CODE 250 ALT 637 W/ HCPCS: Performed by: PHYSICIAN ASSISTANT

## 2018-04-08 PROCEDURE — 85610 PROTHROMBIN TIME: CPT

## 2018-04-08 PROCEDURE — 93005 ELECTROCARDIOGRAM TRACING: CPT

## 2018-04-08 PROCEDURE — 84100 ASSAY OF PHOSPHORUS: CPT

## 2018-04-08 PROCEDURE — 99284 EMERGENCY DEPT VISIT MOD MDM: CPT | Mod: 25

## 2018-04-08 PROCEDURE — 36415 COLL VENOUS BLD VENIPUNCTURE: CPT

## 2018-04-08 PROCEDURE — 99284 EMERGENCY DEPT VISIT MOD MDM: CPT | Mod: ,,, | Performed by: EMERGENCY MEDICINE

## 2018-04-08 PROCEDURE — 83036 HEMOGLOBIN GLYCOSYLATED A1C: CPT

## 2018-04-08 PROCEDURE — 93010 ELECTROCARDIOGRAM REPORT: CPT | Mod: ,,, | Performed by: INTERNAL MEDICINE

## 2018-04-08 PROCEDURE — 96374 THER/PROPH/DIAG INJ IV PUSH: CPT

## 2018-04-08 PROCEDURE — 85025 COMPLETE CBC W/AUTO DIFF WBC: CPT

## 2018-04-08 PROCEDURE — 83880 ASSAY OF NATRIURETIC PEPTIDE: CPT

## 2018-04-08 RX ORDER — VALSARTAN 80 MG/1
320 TABLET ORAL DAILY
Status: DISCONTINUED | OUTPATIENT
Start: 2018-04-09 | End: 2018-04-09 | Stop reason: HOSPADM

## 2018-04-08 RX ORDER — CETIRIZINE HYDROCHLORIDE 5 MG/1
5 TABLET ORAL DAILY
Status: DISCONTINUED | OUTPATIENT
Start: 2018-04-08 | End: 2018-04-09 | Stop reason: HOSPADM

## 2018-04-08 RX ORDER — SIMVASTATIN 20 MG/1
20 TABLET, FILM COATED ORAL NIGHTLY
Status: DISCONTINUED | OUTPATIENT
Start: 2018-04-08 | End: 2018-04-09 | Stop reason: HOSPADM

## 2018-04-08 RX ORDER — HYDRALAZINE HYDROCHLORIDE 25 MG/1
100 TABLET, FILM COATED ORAL
Status: COMPLETED | OUTPATIENT
Start: 2018-04-08 | End: 2018-04-08

## 2018-04-08 RX ORDER — CHLORTHALIDONE 25 MG/1
25 TABLET ORAL
Status: COMPLETED | OUTPATIENT
Start: 2018-04-08 | End: 2018-04-08

## 2018-04-08 RX ORDER — INSULIN ASPART 100 [IU]/ML
0-5 INJECTION, SOLUTION INTRAVENOUS; SUBCUTANEOUS
Status: DISCONTINUED | OUTPATIENT
Start: 2018-04-08 | End: 2018-04-09 | Stop reason: HOSPADM

## 2018-04-08 RX ORDER — ASPIRIN 81 MG/1
81 TABLET ORAL ONCE
Status: COMPLETED | OUTPATIENT
Start: 2018-04-08 | End: 2018-04-08

## 2018-04-08 RX ORDER — IPRATROPIUM BROMIDE AND ALBUTEROL SULFATE 2.5; .5 MG/3ML; MG/3ML
3 SOLUTION RESPIRATORY (INHALATION)
Status: DISCONTINUED | OUTPATIENT
Start: 2018-04-08 | End: 2018-04-09 | Stop reason: HOSPADM

## 2018-04-08 RX ORDER — PREDNISONE 20 MG/1
40 TABLET ORAL DAILY
Status: DISCONTINUED | OUTPATIENT
Start: 2018-04-09 | End: 2018-04-09 | Stop reason: HOSPADM

## 2018-04-08 RX ORDER — GLUCAGON 1 MG
1 KIT INJECTION
Status: DISCONTINUED | OUTPATIENT
Start: 2018-04-08 | End: 2018-04-09 | Stop reason: HOSPADM

## 2018-04-08 RX ORDER — SODIUM CHLORIDE 0.9 % (FLUSH) 0.9 %
3 SYRINGE (ML) INJECTION
Status: DISCONTINUED | OUTPATIENT
Start: 2018-04-08 | End: 2018-04-09 | Stop reason: HOSPADM

## 2018-04-08 RX ORDER — CARVEDILOL 12.5 MG/1
25 TABLET ORAL
Status: COMPLETED | OUTPATIENT
Start: 2018-04-08 | End: 2018-04-08

## 2018-04-08 RX ORDER — IPRATROPIUM BROMIDE AND ALBUTEROL SULFATE 2.5; .5 MG/3ML; MG/3ML
3 SOLUTION RESPIRATORY (INHALATION)
Status: COMPLETED | OUTPATIENT
Start: 2018-04-08 | End: 2018-04-08

## 2018-04-08 RX ORDER — METHYLPREDNISOLONE SOD SUCC 125 MG
125 VIAL (EA) INJECTION
Status: COMPLETED | OUTPATIENT
Start: 2018-04-08 | End: 2018-04-08

## 2018-04-08 RX ORDER — POLYETHYLENE GLYCOL 3350 17 G/17G
17 POWDER, FOR SOLUTION ORAL 2 TIMES DAILY PRN
Status: DISCONTINUED | OUTPATIENT
Start: 2018-04-08 | End: 2018-04-09 | Stop reason: HOSPADM

## 2018-04-08 RX ORDER — BENZONATATE 100 MG/1
200 CAPSULE ORAL 3 TIMES DAILY
Status: DISCONTINUED | OUTPATIENT
Start: 2018-04-08 | End: 2018-04-09 | Stop reason: HOSPADM

## 2018-04-08 RX ORDER — FLUTICASONE FUROATE AND VILANTEROL 100; 25 UG/1; UG/1
1 POWDER RESPIRATORY (INHALATION) DAILY
Status: DISCONTINUED | OUTPATIENT
Start: 2018-04-09 | End: 2018-04-08

## 2018-04-08 RX ORDER — ACETAMINOPHEN 325 MG/1
650 TABLET ORAL EVERY 4 HOURS PRN
Status: DISCONTINUED | OUTPATIENT
Start: 2018-04-08 | End: 2018-04-09 | Stop reason: HOSPADM

## 2018-04-08 RX ORDER — FLUTICASONE FUROATE AND VILANTEROL 100; 25 UG/1; UG/1
1 POWDER RESPIRATORY (INHALATION) DAILY
Status: DISCONTINUED | OUTPATIENT
Start: 2018-04-08 | End: 2018-04-09 | Stop reason: HOSPADM

## 2018-04-08 RX ORDER — IBUPROFEN 200 MG
16 TABLET ORAL
Status: DISCONTINUED | OUTPATIENT
Start: 2018-04-08 | End: 2018-04-09 | Stop reason: HOSPADM

## 2018-04-08 RX ORDER — CARVEDILOL 25 MG/1
50 TABLET ORAL 2 TIMES DAILY
Status: DISCONTINUED | OUTPATIENT
Start: 2018-04-08 | End: 2018-04-09 | Stop reason: HOSPADM

## 2018-04-08 RX ORDER — NITROGLYCERIN 0.4 MG/1
0.4 TABLET SUBLINGUAL CONTINUOUS PRN
Status: DISCONTINUED | OUTPATIENT
Start: 2018-04-08 | End: 2018-04-09 | Stop reason: HOSPADM

## 2018-04-08 RX ORDER — IBUPROFEN 200 MG
24 TABLET ORAL
Status: DISCONTINUED | OUTPATIENT
Start: 2018-04-08 | End: 2018-04-09 | Stop reason: HOSPADM

## 2018-04-08 RX ORDER — VALSARTAN 160 MG/1
320 TABLET ORAL
Status: COMPLETED | OUTPATIENT
Start: 2018-04-08 | End: 2018-04-08

## 2018-04-08 RX ORDER — PRIMIDONE 50 MG/1
50 TABLET ORAL 2 TIMES DAILY
Status: DISCONTINUED | OUTPATIENT
Start: 2018-04-08 | End: 2018-04-09 | Stop reason: HOSPADM

## 2018-04-08 RX ORDER — HYDRALAZINE HYDROCHLORIDE 50 MG/1
100 TABLET, FILM COATED ORAL EVERY 8 HOURS
Status: DISCONTINUED | OUTPATIENT
Start: 2018-04-08 | End: 2018-04-09 | Stop reason: HOSPADM

## 2018-04-08 RX ORDER — SERTRALINE HYDROCHLORIDE 25 MG/1
25 TABLET, FILM COATED ORAL NIGHTLY
Status: DISCONTINUED | OUTPATIENT
Start: 2018-04-08 | End: 2018-04-09 | Stop reason: HOSPADM

## 2018-04-08 RX ADMIN — BENZONATATE 200 MG: 100 CAPSULE ORAL at 02:04

## 2018-04-08 RX ADMIN — INSULIN DETEMIR 12 UNITS: 100 INJECTION, SOLUTION SUBCUTANEOUS at 09:04

## 2018-04-08 RX ADMIN — IPRATROPIUM BROMIDE AND ALBUTEROL SULFATE 3 ML: .5; 3 SOLUTION RESPIRATORY (INHALATION) at 07:04

## 2018-04-08 RX ADMIN — HYDRALAZINE HYDROCHLORIDE 100 MG: 25 TABLET, FILM COATED ORAL at 07:04

## 2018-04-08 RX ADMIN — GUAIFENESIN AND DEXTROMETHORPHAN HYDROBROMIDE 1 TABLET: 600; 30 TABLET, EXTENDED RELEASE ORAL at 10:04

## 2018-04-08 RX ADMIN — FLUTICASONE FUROATE AND VILANTEROL TRIFENATATE 1 PUFF: 100; 25 POWDER RESPIRATORY (INHALATION) at 11:04

## 2018-04-08 RX ADMIN — BENZONATATE 200 MG: 100 CAPSULE ORAL at 10:04

## 2018-04-08 RX ADMIN — HYDRALAZINE HYDROCHLORIDE 100 MG: 50 TABLET ORAL at 02:04

## 2018-04-08 RX ADMIN — IPRATROPIUM BROMIDE AND ALBUTEROL SULFATE 3 ML: .5; 3 SOLUTION RESPIRATORY (INHALATION) at 02:04

## 2018-04-08 RX ADMIN — ACETAMINOPHEN 650 MG: 325 TABLET ORAL at 02:04

## 2018-04-08 RX ADMIN — ASPIRIN 81 MG: 81 TABLET, COATED ORAL at 10:04

## 2018-04-08 RX ADMIN — VALSARTAN 320 MG: 160 TABLET ORAL at 07:04

## 2018-04-08 RX ADMIN — CHLORTHALIDONE 25 MG: 25 TABLET ORAL at 07:04

## 2018-04-08 RX ADMIN — GUAIFENESIN AND DEXTROMETHORPHAN HYDROBROMIDE 1 TABLET: 600; 30 TABLET, EXTENDED RELEASE ORAL at 09:04

## 2018-04-08 RX ADMIN — INSULIN ASPART 3 UNITS: 100 INJECTION, SOLUTION INTRAVENOUS; SUBCUTANEOUS at 05:04

## 2018-04-08 RX ADMIN — CARVEDILOL 25 MG: 12.5 TABLET, FILM COATED ORAL at 07:04

## 2018-04-08 RX ADMIN — SIMVASTATIN 20 MG: 20 TABLET, FILM COATED ORAL at 09:04

## 2018-04-08 RX ADMIN — HYDRALAZINE HYDROCHLORIDE 100 MG: 50 TABLET ORAL at 09:04

## 2018-04-08 RX ADMIN — PRIMIDONE 50 MG: 50 TABLET ORAL at 09:04

## 2018-04-08 RX ADMIN — SERTRALINE HYDROCHLORIDE 25 MG: 25 TABLET ORAL at 09:04

## 2018-04-08 RX ADMIN — CARVEDILOL 50 MG: 25 TABLET, FILM COATED ORAL at 09:04

## 2018-04-08 RX ADMIN — CETIRIZINE HYDROCHLORIDE 5 MG: 5 TABLET, FILM COATED ORAL at 10:04

## 2018-04-08 RX ADMIN — INSULIN ASPART 1 UNITS: 100 INJECTION, SOLUTION INTRAVENOUS; SUBCUTANEOUS at 09:04

## 2018-04-08 RX ADMIN — IPRATROPIUM BROMIDE AND ALBUTEROL SULFATE 3 ML: .5; 3 SOLUTION RESPIRATORY (INHALATION) at 09:04

## 2018-04-08 RX ADMIN — METHYLPREDNISOLONE SODIUM SUCCINATE 125 MG: 125 INJECTION, POWDER, FOR SOLUTION INTRAMUSCULAR; INTRAVENOUS at 08:04

## 2018-04-08 NOTE — ASSESSMENT & PLAN NOTE
Glucose 70 on admission, hold home medications  - A1c 6.7   - low dose SSI, ACHS  - Cardiac diabetic diet  - monitor glucose on steroids

## 2018-04-08 NOTE — H&P
[facility] Hospital Medicine  History & Physical    Patient Name: Myesha Quinones  MRN: [id number]  Admission Date: 4/8/2018  Attending Physician: Darby Newsome MD   Primary Care Provider: Emelina Gaston MD    [facility] Medicine Team: [facility] MED E Haylee Lopez PA-C     Patient information was obtained from patient, past medical records and ER records.     Subjective:     Principal Problem:COPD exacerbation    Chief Complaint:   Chief Complaint   Patient presents with    Shortness of Breath     x few days, worse this AM. Hx of Asthma.         HPI: Patient is a 76yo female with a PMH of HTN, DM, nonischemic cardiomyopathy (AICD in place s/p CRDT), HFpEF (50%), COPD, CKD3 being admitted to observation for COPD exacerbation. She reports a 3 day history of progressively worsening shortness of breath and PELAYO. She notes mild left sided chest discomfort, which is intermittent in nature. This is not exertional and lasts seconds at a time with each episode. Additionally, she notes chronic cough, mild nausea, and urinary frequency. Her asthma/COPD is managed with Advair at baseline, she is also using an albuterol nebs 2-3 times daily since the onset of her symptoms. This has provided little improvement and she feels that her symptoms are related to her history of heart failure. She denies fever, chills, URI symptoms, active chest pain, vomiting, abdominal pain, diarrhea, peripheral edema or weakness.    In the ED, vitals stable. Intake labs unremarkable for acute process. Patient received duo nebs and IV solumedrol with improvement in breathing symptoms.      Past Medical History:   Diagnosis Date    Allergy     Asthma     Basal cell carcinoma     left forehead    Basal cell carcinoma     left nose    Basal cell carcinoma 05/20/2015    right nose    Basal cell carcinoma 12/22/2015    left lower post neck    CAD (coronary artery disease)     Cardiomyopathy     Cardiomyopathy,  ischemic     Cataract     CHF (congestive heart failure)     Chronic kidney disease, stage 3, mod decreased GFR 2/14/2017    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, with long-term current use of insulin 2/22/2018    COPD (chronic obstructive pulmonary disease)     COPD exacerbation 4/8/2018    Defibrillator discharge     Diabetes mellitus     Diabetes mellitus type II     Diabetes with neurologic complications     Goiter     MNG    HX: breast cancer     Hyperlipidemia     Hypertension     Iron deficiency anemia 5/16/2017    Left kidney mass     Meningioma     Osteoporosis, postmenopausal     Pacemaker     Skin cancer     s/p excision    Sleep apnea     CPAP    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/2015    mid forehead    Unspecified vitamin D deficiency     Ventricular tachycardia     Vitamin B12 deficiency     Vitamin D deficiency disease        Past Surgical History:   Procedure Laterality Date    BASAL CELL CARCINOMA EXCISION      posterior neck and nose    BREAST SURGERY      CARDIAC DEFIBRILLATOR PLACEMENT      x 2    CATARACT EXTRACTION W/  INTRAOCULAR LENS IMPLANT Bilateral     CHOLECYSTECTOMY      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      Current Outpatient Prescriptions on File Prior to Encounter   Medication Sig    albuterol 90 mcg/actuation inhaler Inhale 2 puffs into the lungs every 6 (six) hours as needed for Wheezing. Rescue    aspirin (ENTERIC COATED ASPIRIN) 81 MG EC tablet Take 1 tablet by mouth.     carvedilol (COREG) 25 MG tablet Take 2 tablets (50 mg total) by mouth 2 (two) times daily.    chlorthalidone (HYGROTEN) 25 MG Tab TAKE 1 TABLET (25 MG TOTAL) BY MOUTH ONCE DAILY.    CYANOCOBALAMIN, VITAMIN B-12, (B-12 DOTS ORAL) Take 1 tablet by mouth once daily.    fluticasone-salmeterol 250-50 mcg/dose (ADVAIR) 250-50 mcg/dose diskus inhaler Inhale 1 puff into the lungs 2 (two) times daily. This is a change from one month    glimepiride (AMARYL) 2 MG tablet TAKE 1 TABLET BY MOUTH daily    hydrALAZINE (APRESOLINE) 100 MG tablet TAKE 1 TABLET (100 MG TOTAL) BY MOUTH 3 (THREE) TIMES DAILY.    insulin glargine (LANTUS SOLOSTAR) 100 unit/mL (3 mL) InPn pen Inject 30 Units into the skin every evening.    linagliptin (TRADJENTA) 5 mg Tab tablet Take 1 tablet (5 mg total) by mouth once daily.    magnesium oxide (MAG-OX) 400 mg tablet Take 1 tablet (400 mg total) by mouth once daily.    metFORMIN (GLUCOPHAGE) 500 MG tablet TAKE 2 TABLETS (1,000 MG TOTAL) BY MOUTH 2 (TWO) TIMES DAILY WITH MEALS.    NIFEdipine (ADALAT CC) 30 MG TbSR Take 1 tablet (30 mg total) by mouth once daily.    omega-3 fatty acids (FISH OIL) 500 mg Cap Take 1 capsule by mouth Twice daily.    primidone (MYSOLINE) 50 MG Tab Take as directed. (Patient taking differently: Take 50 mg by mouth 2 (two) times daily. Take as directed.)    sertraline (ZOLOFT) 25 MG tablet Take 1 tablet (25 mg total) by mouth every evening.    simvastatin (ZOCOR) 20 MG tablet TAKE 1 TABLET (20 MG TOTAL) BY MOUTH EVERY EVENING.    valsartan (DIOVAN) 320 MG tablet Take 1 tablet (320 mg total) by mouth once daily. (Patient taking differently: Take 320 mg by mouth once daily. Change to night)    ACCU-CHEK HECTOR CONTROL SOLN Soln     ACCU-CHEK HECTOR PLUS METER Misc     azelastine (ASTELIN) 137 mcg (0.1 %) nasal spray 1 spray (137 mcg total) by Nasal route 2 (two) times daily. For severe allergy    blood sugar diagnostic (ACCU-CHEK HECTOR) Strp Uses Accu-Check  "Angelica meter to test BG 4x/day (Patient taking differently: Uses Accu-Check Angelica meter to test BG 4x/day checking 2x day)    glimepiride (AMARYL) 2 MG tablet TAKE 1 TABLET BY MOUTH daily    lancets (ACCU-CHEK SOFTCLIX LANCETS) Misc Uses Accu-Chek Angelica meter to test BG 4x/day    lancets 30 gauge Misc     lancing device Misc     nitroGLYCERIN (NITROSTAT) 0.4 MG SL tablet Place 1 tablet under the tongue continuous prn.      pen needle, diabetic (BD INSULIN PEN NEEDLE UF MINI) 31 gauge x 3/16" Ndle USE WITH LANTUS AT BEDTIME     Family History     Problem Relation (Age of Onset)    Asthma Mother    Cancer Brother, Son, Daughter    Diabetes Father, Sister, Brother, Brother, Brother, Brother, Son, Daughter, Son    Heart disease Father, Sister, Brother, Brother, Brother    Hypertension Brother, Brother, Mother    Melanoma Daughter    No Known Problems Maternal Grandmother, Maternal Grandfather, Paternal Grandmother, Paternal Grandfather    Obesity Daughter    Stroke Mother        Social History Main Topics    Smoking status: Never Smoker    Smokeless tobacco: Never Used    Alcohol use No    Drug use: No    Sexual activity: Yes     Partners: Male     Review of Systems   Constitutional: Negative for chills and fever.   HENT: Negative for trouble swallowing.    Eyes: Negative for photophobia and visual disturbance.   Respiratory: Positive for cough, shortness of breath and wheezing.    Cardiovascular: Positive for chest pain. Negative for palpitations and leg swelling.   Gastrointestinal: Positive for constipation. Negative for abdominal pain, diarrhea and nausea.   Genitourinary: Positive for frequency. Negative for dysuria and hematuria.   Musculoskeletal: Positive for neck pain.   Skin: Negative for rash and wound.   Neurological: Positive for weakness and numbness. Negative for dizziness, syncope, facial asymmetry, speech difficulty and light-headedness.   Psychiatric/Behavioral: Negative for agitation and " decreased concentration. The patient is not nervous/anxious.      Objective:     Vital Signs (Most Recent):  Temp: 97.8 °F (36.6 °C) (04/08/18 0713)  Pulse: 84 (04/08/18 0747)  Resp: 18 (04/08/18 0747)  BP: (!) 173/94 (04/08/18 0713)  SpO2: (!) 94 % (04/08/18 0747) Vital Signs (24h Range):  Temp:  [97.8 °F (36.6 °C)] 97.8 °F (36.6 °C)  Pulse:  [84-96] 84  Resp:  [18-22] 18  SpO2:  [92 %-94 %] 94 %  BP: (173)/(94) 173/94        There is no height or weight on file to calculate BMI.    Physical Exam   Constitutional: She is oriented to person, place, and time. She appears well-developed and well-nourished. No distress.   HENT:   Head: Normocephalic and atraumatic.   Mouth/Throat: No oropharyngeal exudate.   Eyes: EOM are normal. Pupils are equal, round, and reactive to light. No scleral icterus.   Neck: Normal range of motion. Neck supple.   Cardiovascular: Normal rate, regular rhythm and normal heart sounds.    No murmur heard.  Pulmonary/Chest: Effort normal. No respiratory distress. She has decreased breath sounds in the right middle field and the right lower field. She has wheezes (mild, diffuse).   Abdominal: Soft. Bowel sounds are normal. There is no tenderness.   Musculoskeletal: Normal range of motion. She exhibits no edema or tenderness.   Lymphadenopathy:     She has no cervical adenopathy.   Neurological: She is alert and oriented to person, place, and time. No cranial nerve deficit.   Skin: Skin is warm and dry. No rash noted.   Psychiatric: She has a normal mood and affect. Her behavior is normal. Thought content normal.   Nursing note and vitals reviewed.        CRANIAL NERVES     CN III, IV, VI   Pupils are equal, round, and reactive to light.  Extraocular motions are normal.        Significant Labs:   BMP:   Recent Labs  Lab 04/08/18 0739   GLU 70      K 3.9      CO2 26   BUN 22   CREATININE 1.0   CALCIUM 9.9     CBC:   Recent Labs  Lab 04/08/18 0739   WBC 9.76   HGB 11.2*   HCT 35.0*  "         Significant Imaging: I have reviewed all pertinent imaging results/findings within the past 24 hours.    Assessment/Plan:     * COPD exacerbation    COPD    Patient reports "not feeling like herself", presented to the ED for mild SOB and chest tightness. Given duo-nebs and steroids with improvement in symptoms and breathing. Patient reports compliance with home inhalers and denies allergies. CXR without acute abnormality, denies fevers and chills.   - continue duonebs q4 while awake  - Prednisone 40mg daily  - Breo inhaler daily  - Zyrtec daily  - Tessalon pearls TID  - Mucinex DM BID  - stable on room air, likely discharge tomorrow        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, with long-term current use of insulin    Glucose 70 on admission, hold home medications  - A1c 6.7   - low dose SSI, ACHS  - Cardiac diabetic diet  - monitor glucose on steroids        Biventricular ICD (implantable cardioverter-defibrillator) in place    Seen on CXR  - stable        Chronic kidney disease, stage 3, mod decreased GFR    Cr 1.0 on admission, baseline  - stable  - daily BMP        Coronary artery disease involving native coronary artery without angina pectoris - Non-obstructive 50% LAD    - continue Simvastatin 20mg nightly          VTE Risk Mitigation         Ordered     IP VTE HIGH RISK PATIENT  Once      04/08/18 0850     IP VTE HIGH RISK PATIENT  Once      04/08/18 0850     Place JOVANA hose  Until discontinued      04/08/18 0850     Place sequential compression device  Until discontinued      04/08/18 0850             Haylee Lopez PA-C  Department of Hospital Medicine   [facility]  "

## 2018-04-08 NOTE — PROGRESS NOTES
Received patient from ED via stretcher awake,alert and oriented. Respirations regular and unlabored. Reports SOB upon exertion. Patient situated and oriented in the room.POC explained and verbalized understanding. Bed in lowest position,call light within reached. Daughter at bedside.KRISTEN informed of patients arrival to the unit and also asked if pt will be on continuous pulse ox and he said no.

## 2018-04-08 NOTE — ED NOTES
LOC: The patient is awake, alert and aware of environment with an appropriate affect, the patient is oriented x 3 and speaking appropriately.  APPEARANCE: Patient resting comfortably and in no acute distress, patient is clean and well groomed.  SKIN: The skin is warm and dry, patient has normal skin turgor and moist mucus membranes, skin intact, no breakdown or brusing noted.  MUSKULOSKELETAL: Patient moving all extremities well, no obvious swelling or deformities noted.  RESPIRATORY: Airway is open and patent, respirations are spontaneous, patient has a normal effort and rate. Breath sounds are clear and equal to the left with a wheeze in the right lung throughout.  CARDIAC: Normal heart sounds. No peripheral edema.  ABDOMEN: Soft and non tender to palpation, no distention noted. Bowel sounds present.  NEURO: No neuro deficits, hand grasp equal, no drift noted, no facial droop noted. Speech is clear.

## 2018-04-08 NOTE — ASSESSMENT & PLAN NOTE
"COPD    Patient reports "not feeling like herself", presented to the ED for mild SOB and chest tightness. Given duo-nebs and steroids with improvement in symptoms and breathing. Patient reports compliance with home inhalers and denies allergies. CXR without acute abnormality, denies fevers and chills.   - continue duonebs q4 while awake  - Prednisone 40mg daily  - Breo inhaler daily  - Zyrtec daily  - Tessalon pearls TID  - Mucinex DM BID  - stable on room air, likely discharge tomorrow  "

## 2018-04-08 NOTE — ED PROVIDER NOTES
Encounter Date: 4/8/2018       History     Chief Complaint   Patient presents with    Shortness of Breath     x few days, worse this AM. Hx of Asthma.      This is a 77 year old female with a PMH of HTN, DM, nonischemic cardiomyopathy (AICD in place s/p CRDT), HFpEF (50%), COPD, CKD3 who presents to the ED with a chief complaint of shortness of breath. She reports a 3 day history of progressively worsening shortness of breath and PELAYO. She notes mild left sided chest discomfort, which is intermittent in nature. This is not exertional and lasts seconds at a time with each episode. Additionally, she notes chronic cough, mild nausea, and urinary frequency. Her asthma/COPD is managed with Advair at baseline, she is also using an albuterol nebs 2-3 times daily since the onset of her symptoms. This has provided little improvement and she feels that her symptoms are related to her history of heart failure. She denies fever, chills, URI symptoms, active chest pain, vomiting, abdominal pain, diarrhea, peripheral edema or weakness.          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, with long-term current use of insulin 2/22/2018    COPD (chronic obstructive pulmonary disease)     COPD exacerbation 4/8/2018    Defibrillator discharge     Diabetes mellitus     Diabetes mellitus type II      Diabetes with neurologic complications     Goiter     MNG    HX: breast cancer     Hyperlipidemia     Hypertension     Iron deficiency anemia 5/16/2017    Left kidney mass     Meningioma     Osteoporosis, postmenopausal     Pacemaker     Skin cancer     s/p excision    Sleep apnea     CPAP    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/2015    mid forehead    Unspecified vitamin D deficiency     Ventricular tachycardia     Vitamin B12 deficiency     Vitamin D deficiency disease      Past Surgical History:   Procedure Laterality Date    BASAL CELL CARCINOMA EXCISION      posterior neck and nose    BREAST SURGERY      CARDIAC DEFIBRILLATOR PLACEMENT      x 2    CATARACT EXTRACTION W/  INTRAOCULAR LENS IMPLANT Bilateral     CHOLECYSTECTOMY      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    FRACTURE SURGERY      left elbow and wrist as a child    HYSTERECTOMY      MASTECTOMY      right    MASTECTOMY Right     SQUAMOUS CELL CARCINOMA EXCISION      remved from forehead    TONSILLECTOMY       Family History   Problem Relation Age of Onset    Diabetes Father     Heart disease Father     Diabetes Sister     Heart disease Sister     Diabetes Brother     Heart disease Brother     Hypertension Brother     Diabetes Brother     Heart disease Brother     Hypertension Brother     Diabetes Brother     Heart disease Brother     Cancer Brother      colon    Diabetes Brother     Cancer Son      skin    Diabetes Son      prediabetes    Diabetes Daughter      prediabetes    Cancer Daughter      melanoma    Obesity Daughter     Melanoma Daughter     Diabetes Son     Asthma Mother     Hypertension Mother     Stroke Mother     No Known Problems Maternal Grandmother     No Known Problems Maternal Grandfather     No Known Problems Paternal Grandmother     No Known Problems Paternal Grandfather     Amblyopia Neg Hx     Blindness Neg Hx     Cataracts Neg Hx     Glaucoma Neg Hx     Macular  degeneration Neg Hx     Retinal detachment Neg Hx     Strabismus Neg Hx     Thyroid disease Neg Hx      Social History   Substance Use Topics    Smoking status: Never Smoker    Smokeless tobacco: Never Used    Alcohol use No     Review of Systems   Constitutional: Negative for chills and fever.   HENT: Negative for sore throat and voice change.    Respiratory: Positive for cough, chest tightness, shortness of breath and wheezing.    Cardiovascular: Positive for chest pain. Negative for leg swelling.   Gastrointestinal: Positive for nausea. Negative for vomiting.   Genitourinary: Positive for frequency. Negative for dysuria.   Musculoskeletal: Negative for back pain.   Skin: Negative for rash.   Neurological: Negative for weakness.   Hematological: Does not bruise/bleed easily.       Physical Exam     Initial Vitals [04/08/18 0713]   BP Pulse Resp Temp SpO2   (!) 173/94 96 (!) 22 97.8 °F (36.6 °C) (!) 92 %      MAP       120.33         Physical Exam    Constitutional: She appears well-developed and well-nourished. No distress.   HENT:   Head: Atraumatic.   Eyes: Conjunctivae and EOM are normal. Pupils are equal, round, and reactive to light.   Neck: Normal range of motion. Neck supple.   Cardiovascular: Normal rate, regular rhythm, normal heart sounds and intact distal pulses.   No peripheral edema.  Right arm lymphedema, at baseline (hx mastectomy 1998).   Pulmonary/Chest: No respiratory distress. She has decreased breath sounds in the right lower field and the left lower field. She has wheezes in the right upper field, the right middle field, the left upper field and the left middle field. She has no rhonchi. She has no rales.   O2 sat 92% on room air on arrival.   Abdominal: Soft. Bowel sounds are normal. There is no tenderness. There is no rebound and no guarding.   Neurological: She is alert and oriented to person, place, and time.   Skin: Skin is warm and dry. No rash noted.         ED Course    Procedures  Labs Reviewed   CBC W/ AUTO DIFFERENTIAL - Abnormal; Notable for the following:        Result Value    RBC 3.84 (*)     Hemoglobin 11.2 (*)     Hematocrit 35.0 (*)     Immature Granulocytes 0.7 (*)     Immature Grans (Abs) 0.07 (*)     Eos # 0.6 (*)     Lymph% 14.8 (*)     All other components within normal limits   COMPREHENSIVE METABOLIC PANEL - Abnormal; Notable for the following:     eGFR if non  54.1 (*)     All other components within normal limits   B-TYPE NATRIURETIC PEPTIDE - Abnormal; Notable for the following:      (*)     All other components within normal limits   TROPONIN I   PROTIME-INR   POCT GLUCOSE   POCT GLUCOSE MONITORING CONTINUOUS     Imaging Results          X-Ray Chest AP Portable (Final result)  Result time 04/08/18 08:02:48    Final result by Ruperto Figueroa MD (04/08/18 08:02:48)                 Impression:      No acute cardiopulmonary disease      Electronically signed by: Ruperto Figueroa MD  Date:    04/08/2018  Time:    08:02             Narrative:    EXAMINATION:  XR CHEST AP PORTABLE    CLINICAL HISTORY:  CHF;    TECHNIQUE:  Single frontal view of the chest was performed.    COMPARISON:  11/07/2016    FINDINGS:  The heart size is upper normal.  ICD is again seen on the left with 3 leads to the heart.  Heart size is upper normal.  Mediastinal contour is unremarkable.  Lungs are expanded and clear.  No lung consolidation or pleural fluid is identified.  Skeletal structures are intact without acute finding.                                              APC / Resident Notes:   77 year old female presents with shortness of breath, occasional chest pain.  On exam she is afebrile and nontoxic. She is hypertensive on arrival, O2 sat 92% on room air. She is wheezing throughout with decreased breath sounds at the bases. No peripheral edema. No chest wall ttp.    DDX includes but is not limited to COPD exacerbation, heart failure, ACS, pneumonia.   I  considered but do not suspect PE. Well's score is 0%/low risk.    8:17 AM Resolution of wheezing with duonebs. Patient with O2 sats at 95%. CXR is clear. Labs are unremarkable, . Solumedrol given for suspected COPD exacerbation - consider observation overnight. I discussed the care of this patient with my supervising MD.     8:32 AM Reassessed patient - discussed observation admission. She reports she has not taken one of her BP medications the last few days due to side effect of constipation, unsure of which one (possibly hydralazine or valsartan. She seems anxious about her elevated BP (185/83 right now). Her regular morning medications were given, reassurance provided. Will continue to monitor.                     Clinical Impression:   The primary encounter diagnosis was COPD exacerbation. A diagnosis of Shortness of breath was also pertinent to this visit.    Disposition:   Disposition: Placed in Observation  Condition: Stable                        Ange Vaughan PA-C  04/08/18 [id number]

## 2018-04-08 NOTE — SUBJECTIVE & OBJECTIVE
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, with long-term current use of insulin 2/22/2018    COPD (chronic obstructive pulmonary disease)     COPD exacerbation 4/8/2018    Defibrillator discharge     Diabetes mellitus     Diabetes mellitus type II     Diabetes with neurologic complications     Goiter     MNG    HX: breast cancer     Hyperlipidemia     Hypertension     Iron deficiency anemia 5/16/2017    Left kidney mass     Meningioma     Osteoporosis, postmenopausal     Pacemaker     Skin cancer     s/p excision    Sleep apnea     CPAP    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/2015    mid forehead    Unspecified vitamin D deficiency     Ventricular tachycardia     Vitamin B12 deficiency     Vitamin D deficiency disease        Past Surgical History:   Procedure Laterality Date    BASAL CELL CARCINOMA EXCISION      posterior neck and nose    BREAST SURGERY      CARDIAC DEFIBRILLATOR PLACEMENT      x 2    CATARACT EXTRACTION W/  INTRAOCULAR LENS IMPLANT Bilateral     CHOLECYSTECTOMY      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      Current Outpatient Prescriptions on File Prior to Encounter   Medication Sig    albuterol 90 mcg/actuation inhaler Inhale 2 puffs into the lungs every 6 (six) hours as needed for Wheezing. Rescue    aspirin (ENTERIC COATED ASPIRIN) 81 MG EC tablet Take 1 tablet by mouth.    carvedilol (COREG) 25 MG tablet Take 2 tablets (50 mg total) by mouth 2 (two) times daily.    chlorthalidone (HYGROTEN) 25 MG Tab TAKE 1 TABLET (25 MG TOTAL) BY MOUTH ONCE DAILY.    CYANOCOBALAMIN, VITAMIN B-12, (B-12 DOTS ORAL) Take 1 tablet by mouth once daily.    fluticasone-salmeterol 250-50 mcg/dose (ADVAIR) 250-50 mcg/dose diskus inhaler Inhale 1 puff into the lungs 2 (two) times daily. This is a change from one month    glimepiride (AMARYL) 2 MG tablet TAKE 1 TABLET BY MOUTH daily    hydrALAZINE (APRESOLINE) 100 MG tablet TAKE 1 TABLET (100 MG TOTAL) BY MOUTH 3 (THREE) TIMES DAILY.    insulin glargine (LANTUS SOLOSTAR) 100 unit/mL (3 mL) InPn pen Inject 30 Units into the skin every evening.    linagliptin (TRADJENTA) 5 mg Tab tablet Take 1 tablet (5 mg total) by mouth once daily.    magnesium oxide (MAG-OX) 400 mg tablet Take 1 tablet (400 mg total) by mouth once daily.    metFORMIN (GLUCOPHAGE) 500 MG tablet TAKE 2 TABLETS (1,000 MG TOTAL) BY MOUTH 2 (TWO) TIMES DAILY WITH MEALS.    NIFEdipine (ADALAT CC) 30 MG TbSR Take 1 tablet (30 mg total) by mouth once daily.    omega-3 fatty acids (FISH OIL) 500 mg Cap Take 1 capsule by mouth Twice daily.    primidone (MYSOLINE) 50 MG Tab Take as directed. (Patient taking differently: Take 50 mg by mouth 2 (two) times daily. Take as directed.)    sertraline (ZOLOFT) 25 MG tablet Take 1 tablet (25 mg total) by mouth every evening.    simvastatin (ZOCOR) 20 MG tablet TAKE 1 TABLET (20 MG TOTAL) BY MOUTH EVERY EVENING.    valsartan (DIOVAN) 320 MG tablet Take 1 tablet (320 mg total) by mouth once daily. (Patient taking  "differently: Take 320 mg by mouth once daily. Change to night)    ACCU-CHEK HECTOR CONTROL SOLN Soln     ACCU-CHEK HECTOR PLUS METER Misc     azelastine (ASTELIN) 137 mcg (0.1 %) nasal spray 1 spray (137 mcg total) by Nasal route 2 (two) times daily. For severe allergy    blood sugar diagnostic (ACCU-CHEK HECTOR) Strp Uses Accu-Check Hector meter to test BG 4x/day (Patient taking differently: Uses Accu-Check Hector meter to test BG 4x/day checking 2x day)    glimepiride (AMARYL) 2 MG tablet TAKE 1 TABLET BY MOUTH daily    lancets (ACCU-CHEK SOFTCLIX LANCETS) Misc Uses Accu-Chek Hector meter to test BG 4x/day    lancets 30 gauge Misc     lancing device Misc     nitroGLYCERIN (NITROSTAT) 0.4 MG SL tablet Place 1 tablet under the tongue continuous prn.      pen needle, diabetic (BD INSULIN PEN NEEDLE UF MINI) 31 gauge x 3/16" Ndle USE WITH LANTUS AT BEDTIME     Family History     Problem Relation (Age of Onset)    Asthma Mother    Cancer Brother, Son, Daughter    Diabetes Father, Sister, Brother, Brother, Brother, Brother, Son, Daughter, Son    Heart disease Father, Sister, Brother, Brother, Brother    Hypertension Brother, Brother, Mother    Melanoma Daughter    No Known Problems Maternal Grandmother, Maternal Grandfather, Paternal Grandmother, Paternal Grandfather    Obesity Daughter    Stroke Mother        Social History Main Topics    Smoking status: Never Smoker    Smokeless tobacco: Never Used    Alcohol use No    Drug use: No    Sexual activity: Yes     Partners: Male     Review of Systems   Constitutional: Negative for chills and fever.   HENT: Negative for trouble swallowing.    Eyes: Negative for photophobia and visual disturbance.   Respiratory: Positive for cough, shortness of breath and wheezing.    Cardiovascular: Positive for chest pain. Negative for palpitations and leg swelling.   Gastrointestinal: Positive for constipation. Negative for abdominal pain, diarrhea and nausea.   Genitourinary: " Positive for frequency. Negative for dysuria and hematuria.   Musculoskeletal: Positive for neck pain.   Skin: Negative for rash and wound.   Neurological: Positive for weakness and numbness. Negative for dizziness, syncope, facial asymmetry, speech difficulty and light-headedness.   Psychiatric/Behavioral: Negative for agitation and decreased concentration. The patient is not nervous/anxious.      Objective:     Vital Signs (Most Recent):  Temp: 97.8 °F (36.6 °C) (04/08/18 0713)  Pulse: 84 (04/08/18 0747)  Resp: 18 (04/08/18 0747)  BP: (!) 173/94 (04/08/18 0713)  SpO2: (!) 94 % (04/08/18 0747) Vital Signs (24h Range):  Temp:  [97.8 °F (36.6 °C)] 97.8 °F (36.6 °C)  Pulse:  [84-96] 84  Resp:  [18-22] 18  SpO2:  [92 %-94 %] 94 %  BP: (173)/(94) 173/94        There is no height or weight on file to calculate BMI.    Physical Exam   Constitutional: She is oriented to person, place, and time. She appears well-developed and well-nourished. No distress.   HENT:   Head: Normocephalic and atraumatic.   Mouth/Throat: No oropharyngeal exudate.   Eyes: EOM are normal. Pupils are equal, round, and reactive to light. No scleral icterus.   Neck: Normal range of motion. Neck supple.   Cardiovascular: Normal rate, regular rhythm and normal heart sounds.    No murmur heard.  Pulmonary/Chest: Effort normal. No respiratory distress. She has decreased breath sounds in the right middle field and the right lower field. She has wheezes (mild, diffuse).   Abdominal: Soft. Bowel sounds are normal. There is no tenderness.   Musculoskeletal: Normal range of motion. She exhibits no edema or tenderness.   Lymphadenopathy:     She has no cervical adenopathy.   Neurological: She is alert and oriented to person, place, and time. No cranial nerve deficit.   Skin: Skin is warm and dry. No rash noted.   Psychiatric: She has a normal mood and affect. Her behavior is normal. Thought content normal.   Nursing note and vitals reviewed.        CRANIAL  NERVES     CN III, IV, VI   Pupils are equal, round, and reactive to light.  Extraocular motions are normal.        Significant Labs:   BMP:   Recent Labs  Lab 04/08/18  0739   GLU 70      K 3.9      CO2 26   BUN 22   CREATININE 1.0   CALCIUM 9.9     CBC:   Recent Labs  Lab 04/08/18  0739   WBC 9.76   HGB 11.2*   HCT 35.0*          Significant Imaging: I have reviewed all pertinent imaging results/findings within the past 24 hours.

## 2018-04-09 VITALS
TEMPERATURE: 98 F | HEIGHT: 63 IN | BODY MASS INDEX: 29.23 KG/M2 | RESPIRATION RATE: 18 BRPM | SYSTOLIC BLOOD PRESSURE: 159 MMHG | OXYGEN SATURATION: 92 % | WEIGHT: 165 LBS | HEART RATE: 81 BPM | DIASTOLIC BLOOD PRESSURE: 70 MMHG

## 2018-04-09 LAB
ANION GAP SERPL CALC-SCNC: 11 MMOL/L
BASOPHILS # BLD AUTO: 0.05 K/UL
BASOPHILS NFR BLD: 0.5 %
BUN SERPL-MCNC: 33 MG/DL
CALCIUM SERPL-MCNC: 9.2 MG/DL
CHLORIDE SERPL-SCNC: 99 MMOL/L
CO2 SERPL-SCNC: 23 MMOL/L
CREAT SERPL-MCNC: 1.4 MG/DL
DIFFERENTIAL METHOD: ABNORMAL
EOSINOPHIL # BLD AUTO: 0.1 K/UL
EOSINOPHIL NFR BLD: 0.9 %
ERYTHROCYTE [DISTWIDTH] IN BLOOD BY AUTOMATED COUNT: 13.1 %
EST. GFR  (AFRICAN AMERICAN): 41.5 ML/MIN/1.73 M^2
EST. GFR  (NON AFRICAN AMERICAN): 36 ML/MIN/1.73 M^2
GLUCOSE SERPL-MCNC: 154 MG/DL
HCT VFR BLD AUTO: 33 %
HGB BLD-MCNC: 10.6 G/DL
IMM GRANULOCYTES # BLD AUTO: 0.08 K/UL
IMM GRANULOCYTES NFR BLD AUTO: 0.8 %
LYMPHOCYTES # BLD AUTO: 2.3 K/UL
LYMPHOCYTES NFR BLD: 22 %
MCH RBC QN AUTO: 29 PG
MCHC RBC AUTO-ENTMCNC: 32.1 G/DL
MCV RBC AUTO: 90 FL
MONOCYTES # BLD AUTO: 1 K/UL
MONOCYTES NFR BLD: 9.2 %
NEUTROPHILS # BLD AUTO: 7 K/UL
NEUTROPHILS NFR BLD: 66.6 %
NRBC BLD-RTO: 0 /100 WBC
PLATELET # BLD AUTO: 237 K/UL
PMV BLD AUTO: 11.1 FL
POCT GLUCOSE: 127 MG/DL (ref 70–110)
POTASSIUM SERPL-SCNC: 3.8 MMOL/L
RBC # BLD AUTO: 3.66 M/UL
SODIUM SERPL-SCNC: 133 MMOL/L
WBC # BLD AUTO: 10.48 K/UL

## 2018-04-09 PROCEDURE — 80048 BASIC METABOLIC PNL TOTAL CA: CPT

## 2018-04-09 PROCEDURE — 99217 PR OBSERVATION CARE DISCHARGE: CPT | Mod: ,,, | Performed by: PHYSICIAN ASSISTANT

## 2018-04-09 PROCEDURE — G0378 HOSPITAL OBSERVATION PER HR: HCPCS

## 2018-04-09 PROCEDURE — 63600175 PHARM REV CODE 636 W HCPCS: Performed by: PHYSICIAN ASSISTANT

## 2018-04-09 PROCEDURE — 99900035 HC TECH TIME PER 15 MIN (STAT)

## 2018-04-09 PROCEDURE — 25000242 PHARM REV CODE 250 ALT 637 W/ HCPCS: Performed by: PHYSICIAN ASSISTANT

## 2018-04-09 PROCEDURE — 36415 COLL VENOUS BLD VENIPUNCTURE: CPT

## 2018-04-09 PROCEDURE — 25000003 PHARM REV CODE 250: Performed by: PHYSICIAN ASSISTANT

## 2018-04-09 PROCEDURE — 27000190 HC CPAP FULL FACE MASK W/VALVE

## 2018-04-09 PROCEDURE — 94640 AIRWAY INHALATION TREATMENT: CPT

## 2018-04-09 PROCEDURE — 94761 N-INVAS EAR/PLS OXIMETRY MLT: CPT

## 2018-04-09 PROCEDURE — 85025 COMPLETE CBC W/AUTO DIFF WBC: CPT

## 2018-04-09 RX ORDER — CETIRIZINE HYDROCHLORIDE 5 MG/1
5 TABLET ORAL DAILY
Refills: 0 | COMMUNITY
Start: 2018-04-09 | End: 2018-06-26

## 2018-04-09 RX ORDER — PREDNISONE 20 MG/1
40 TABLET ORAL DAILY
Qty: 8 TABLET | Refills: 0 | Status: SHIPPED | OUTPATIENT
Start: 2018-04-09 | End: 2018-04-13

## 2018-04-09 RX ORDER — IPRATROPIUM BROMIDE AND ALBUTEROL SULFATE 2.5; .5 MG/3ML; MG/3ML
3 SOLUTION RESPIRATORY (INHALATION) EVERY 4 HOURS
Qty: 1 BOX | Refills: 3 | Status: SHIPPED | OUTPATIENT
Start: 2018-04-09 | End: 2018-08-06 | Stop reason: SDUPTHER

## 2018-04-09 RX ORDER — BENZONATATE 200 MG/1
200 CAPSULE ORAL 3 TIMES DAILY
Qty: 30 CAPSULE | Refills: 0 | Status: SHIPPED | OUTPATIENT
Start: 2018-04-09 | End: 2018-04-19

## 2018-04-09 RX ADMIN — PRIMIDONE 50 MG: 50 TABLET ORAL at 08:04

## 2018-04-09 RX ADMIN — HYDRALAZINE HYDROCHLORIDE 100 MG: 50 TABLET ORAL at 05:04

## 2018-04-09 RX ADMIN — FLUTICASONE FUROATE AND VILANTEROL TRIFENATATE 1 PUFF: 100; 25 POWDER RESPIRATORY (INHALATION) at 08:04

## 2018-04-09 RX ADMIN — CETIRIZINE HYDROCHLORIDE 5 MG: 5 TABLET, FILM COATED ORAL at 08:04

## 2018-04-09 RX ADMIN — BENZONATATE 200 MG: 100 CAPSULE ORAL at 08:04

## 2018-04-09 RX ADMIN — PREDNISONE 40 MG: 20 TABLET ORAL at 08:04

## 2018-04-09 RX ADMIN — IPRATROPIUM BROMIDE AND ALBUTEROL SULFATE 3 ML: .5; 3 SOLUTION RESPIRATORY (INHALATION) at 08:04

## 2018-04-09 RX ADMIN — GUAIFENESIN AND DEXTROMETHORPHAN HYDROBROMIDE 1 TABLET: 600; 30 TABLET, EXTENDED RELEASE ORAL at 08:04

## 2018-04-09 RX ADMIN — CARVEDILOL 50 MG: 25 TABLET, FILM COATED ORAL at 08:04

## 2018-04-09 RX ADMIN — VALSARTAN 320 MG: 80 TABLET ORAL at 08:04

## 2018-04-09 NOTE — NURSING
Patient discharged home, went in and discussed discharged instructions with the patient and her .  Explained to the patient that she needs to  her medications from [facility] pharmacy and discussed the medications she needed to  over the counter and scheduled physician appointments.  Patient had a 18g peripheral IV to her left AC, I removed IV.  Patient and her  verbalized understanding and acknowledged the discharge paperwork they received.

## 2018-04-09 NOTE — DISCHARGE SUMMARY
"[facility] Hospital Medicine  Discharge Summary      Patient Name: Myesha Quinones  MRN: [id number]  Admission Date: 4/8/2018  Hospital Length of Stay: 0 days  Discharge Date and Time: 4/9/2018 12:47 PM  Attending Physician: Felicia att. providers found   Discharging Provider: Haylee Lopez PA-C  Primary Care Provider: Emelina Gaston MD  [facility] Medicine Team: [facility] HOSP MED E Haylee Lopez PA-C    HPI:   Patient is a 78yo female with a PMH of HTN, DM, nonischemic cardiomyopathy (AICD in place s/p CRDT), HFpEF (50%), COPD, CKD3 being admitted to observation for COPD exacerbation. She reports a 3 day history of progressively worsening shortness of breath and PELAYO. She notes mild left sided chest discomfort, which is intermittent in nature. This is not exertional and lasts seconds at a time with each episode. Additionally, she notes chronic cough, mild nausea, and urinary frequency. Her asthma/COPD is managed with Advair at baseline, she is also using an albuterol nebs 2-3 times daily since the onset of her symptoms. This has provided little improvement and she feels that her symptoms are related to her history of heart failure. She denies fever, chills, URI symptoms, active chest pain, vomiting, abdominal pain, diarrhea, peripheral edema or weakness.    In the ED, vitals stable. Intake labs unremarkable for acute process. Patient received duo nebs and IV solumedrol with improvement in breathing symptoms.      * No surgery found *      Hospital Course:   Patient admitted to observation for COPD exacerbation. On admission, O2 sats in low 90s. Patient started on breathing treatments, steroids, and antitussives with return to baseline. Patient requesting discharge. Patient stable.     Consults:     * COPD exacerbation    COPD    Patient reports "not feeling like herself", presented to the ED for mild SOB and chest tightness. Given duo-nebs and steroids with improvement in symptoms and " breathing. Patient reports compliance with home inhalers and denies allergies. CXR without acute abnormality, denies fevers and chills.   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, with long-term current use of insulin    Glucose 70 on admission, hold home medications  - A1c 6.7   - low dose SSI, ACHS  - Cardiac diabetic diet  - monitor glucose on steroids        Biventricular ICD (implantable cardioverter-defibrillator) in place    Seen on CXR  - stable        Chronic kidney disease, stage 3, mod decreased GFR    Cr 1.0 on admission, baseline  - stable  - daily BMP        Coronary artery disease involving native coronary artery without angina pectoris - Non-obstructive 50% LAD    - continue Simvastatin 20mg nightly          Final Active Diagnoses:    Diagnosis Date Noted POA    PRINCIPAL PROBLEM:  COPD exacerbation [J44.1] 04/08/2018 Yes    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, with long-term current use of insulin [E11.3213, Z79.4] 02/22/2018 Not Applicable    Biventricular ICD (implantable cardioverter-defibrillator) in place [Z95.810] 01/09/2014 Yes    Chronic kidney disease, stage 3, mod decreased GFR [N18.3] 02/14/2017 Yes    Coronary artery disease involving native coronary artery without angina pectoris - Non-obstructive 50% LAD [I25.10] 07/20/2015 Yes    COPD (chronic obstructive pulmonary disease) [J44.9] 04/04/2018 Yes      Problems Resolved During this Admission:    Diagnosis Date Noted Date Resolved POA       Discharged Condition: good    Disposition: Home or Self Care    Follow Up:  Follow-up Information     Emelina Gaston MD.    Specialty:  Internal Medicine  Contact  information:  [address]  [facility] [address]  [phone]                 Patient Instructions:     Diet diabetic     Diet Cardiac     Activity as tolerated     Notify your health care provider if you experience any of the following:  persistent nausea and vomiting or diarrhea     Notify your health care provider if you experience any of the following:  severe uncontrolled pain     Notify your health care provider if you experience any of the following:  difficulty breathing or increased cough     Notify your health care provider if you experience any of the following:  severe persistent headache     Notify your health care provider if you experience any of the following:  persistent dizziness, light-headedness, or visual disturbances     Notify your health care provider if you experience any of the following:  increased confusion or weakness         Significant Diagnostic Studies: Labs:   BMP:   Recent Labs  Lab 04/08/18  0739 04/08/18  1138 04/09/18  0344   GLU 70  --  154*     --  133*   K 3.9  --  3.8     --  99   CO2 26  --  23   BUN 22  --  33*   CREATININE 1.0  --  1.4   CALCIUM 9.9  --  9.2   MG  --  1.8  --    , CMP   Recent Labs  Lab 04/08/18  0739 04/09/18  0344    133*   K 3.9 3.8    99   CO2 26 23   GLU 70 154*   BUN 22 33*   CREATININE 1.0 1.4   CALCIUM 9.9 9.2   PROT 7.1  --    ALBUMIN 3.6  --    BILITOT 0.2  --    ALKPHOS 74  --    AST 20  --    ALT 21  --    ANIONGAP 10 11   ESTGFRAFRICA >60.0 41.5*   EGFRNONAA 54.1* 36.0*    and CBC   Recent Labs  Lab 04/08/18  0739 04/09/18  0344   WBC 9.76 10.48   HGB 11.2* 10.6*   HCT 35.0* 33.0*    237       Pending Diagnostic Studies:     None         Medications:  Reconciled Home Medications:      Medication List      START taking these medications    albuterol-ipratropium 2.5mg-0.5mg/3mL 0.5 mg-3 mg(2.5 mg base)/3 mL nebulizer solution  Commonly known as:  DUO-NEB  Take 3 mLs by nebulization every 4 (four) hours.  Rescue     benzonatate 200 MG capsule  Commonly known as:  TESSALON  Take 1 capsule (200 mg total) by mouth 3 (three) times daily.     cetirizine 5 MG tablet  Commonly known as:  ZYRTEC  Take 1 tablet (5 mg total) by mouth once daily.     dextromethorphan-guaifenesin  mg  mg per 12 hr tablet  Commonly known as:  MUCINEX DM  Take 1 tablet by mouth 2 (two) times daily.     predniSONE 20 MG tablet  Commonly known as:  DELTASONE  Take 2 tablets (40 mg total) by mouth once daily.        CHANGE how you take these medications    blood sugar diagnostic Strp  Commonly known as:  ACCU-CHEK HECTOR  Uses Accu-Check Hector meter to test BG 4x/day  What changed:  additional instructions     glimepiride 2 MG tablet  Commonly known as:  AMARYL  TAKE 1 TABLET BY MOUTH daily  What changed:  Another medication with the same name was removed. Continue taking this medication, and follow the directions you see here.     primidone 50 MG Tab  Commonly known as:  MYSOLINE  Take as directed.  What changed:  · how much to take  · how to take this  · when to take this  · additional instructions     valsartan 320 MG tablet  Commonly known as:  DIOVAN  Take 1 tablet (320 mg total) by mouth once daily.  What changed:  additional instructions        CONTINUE taking these medications    ACCU-CHEK HECTOR CONTROL SOLN Soln  Generic drug:  blood glucose control high,low     ACCU-CHEK HECTOR PLUS METER Misc  Generic drug:  blood-glucose meter     albuterol 90 mcg/actuation inhaler  Inhale 2 puffs into the lungs every 6 (six) hours as needed for Wheezing. Rescue     azelastine 137 mcg (0.1 %) nasal spray  Commonly known as:  ASTELIN  1 spray (137 mcg total) by Nasal route 2 (two) times daily. For severe allergy     B-12 DOTS ORAL     carvedilol 25 MG tablet  Commonly known as:  COREG  Take 2 tablets (50 mg total) by mouth 2 (two) times daily.     chlorthalidone 25 MG Tab  Commonly known as:  HYGROTEN  TAKE 1 TABLET (25 MG TOTAL) BY MOUTH ONCE  "DAILY.     ENTERIC COATED ASPIRIN 81 MG EC tablet  Generic drug:  aspirin     FISH  mg Cap  Generic drug:  omega-3 fatty acids     fluticasone-salmeterol 250-50 mcg/dose 250-50 mcg/dose diskus inhaler  Commonly known as:  ADVAIR  Inhale 1 puff into the lungs 2 (two) times daily. This is a change from one month     hydrALAZINE 100 MG tablet  Commonly known as:  APRESOLINE  TAKE 1 TABLET (100 MG TOTAL) BY MOUTH 3 (THREE) TIMES DAILY.     insulin glargine 100 unit/mL (3 mL) Inpn pen  Commonly known as:  LANTUS SOLOSTAR U-100 INSULIN  Inject 30 Units into the skin every evening.     * lancets Misc  Commonly known as:  ACCU-CHEK SOFTCLIX LANCETS  Uses Accu-Chek Angelica meter to test BG 4x/day     * lancets 30 gauge Misc     lancing device Misc     linagliptin 5 mg Tab tablet  Commonly known as:  TRADJENTA  Take 1 tablet (5 mg total) by mouth once daily.     magnesium oxide 400 mg tablet  Commonly known as:  MAG-OX  Take 1 tablet (400 mg total) by mouth once daily.     metFORMIN 500 MG tablet  Commonly known as:  GLUCOPHAGE  TAKE 2 TABLETS (1,000 MG TOTAL) BY MOUTH 2 (TWO) TIMES DAILY WITH MEALS.     NIFEdipine 30 MG Tbsr  Commonly known as:  ADALAT CC  Take 1 tablet (30 mg total) by mouth once daily.     nitroGLYCERIN 0.4 MG SL tablet  Commonly known as:  NITROSTAT     pen needle, diabetic 31 gauge x 3/16" Ndle  Commonly known as:  BD INSULIN PEN NEEDLE UF MINI  USE WITH LANTUS AT BEDTIME     sertraline 25 MG tablet  Commonly known as:  ZOLOFT  Take 1 tablet (25 mg total) by mouth every evening.     simvastatin 20 MG tablet  Commonly known as:  ZOCOR  TAKE 1 TABLET (20 MG TOTAL) BY MOUTH EVERY EVENING.        * This list has 2 medication(s) that are the same as other medications prescribed for you. Read the directions carefully, and ask your doctor or other care provider to review them with you.               Where to Get Your Medications      These medications were sent to [facility] #[id number] - [place] - [address] AT [facility]  [address] [place] [address]    Phone:  [phone]   · albuterol-ipratropium 2.5mg-0.5mg/3mL 0.5 mg-3 mg(2.5 mg base)/3 mL nebulizer solution  · benzonatate 200 MG capsule  · predniSONE 20 MG tablet     You can get these medications from any pharmacy    You don't need a prescription for these medications  · cetirizine 5 MG tablet  · dextromethorphan-guaifenesin  mg  mg per 12 hr tablet         Indwelling Lines/Drains at time of discharge:   Lines/Drains/Airways          No matching active lines, drains, or airways          Time spent on the discharge of patient: 36 minutes  Patient was seen and examined on the date of discharge and determined to be suitable for discharge.         Haylee Lopez PA-C  Department of Hospital Medicine  [facility]

## 2018-04-09 NOTE — ASSESSMENT & PLAN NOTE
"COPD    Patient reports "not feeling like herself", presented to the ED for mild SOB and chest tightness. Given duo-nebs and steroids with improvement in symptoms and breathing. Patient reports compliance with home inhalers and denies allergies. CXR without acute abnormality, denies fevers and chills.   - continue duonebs q4 while awake  - Prednisone 40mg daily  - Breo inhaler daily  - Zyrtec daily  - Tessalon pearls TID  - Mucinex DM BID  - stable on room air, discharge  "

## 2018-04-09 NOTE — HOSPITAL COURSE
Patient admitted to observation for COPD exacerbation. On admission, O2 sats in low 90s. Patient started on breathing treatments, steroids, and antitussives with return to baseline. Patient requesting discharge. Patient stable.

## 2018-04-09 NOTE — PLAN OF CARE
Problem: Diabetes, Type 2 (Adult)  Goal: Signs and Symptoms of Listed Potential Problems Will be Absent, Minimized or Managed (Diabetes, Type 2)  Signs and symptoms of listed potential problems will be absent, minimized or managed by discharge/transition of care (reference Diabetes, Type 2 (Adult) CPG).   Outcome: Ongoing (interventions implemented as appropriate)  Blood sugar monitored as ordered, no PRN insulin required.  Patient will continue to monitor her blood sugar at home and resume medications per discharge medication list.  Patient is discharging home.    Problem: Fall Risk (Adult)  Goal: Identify Related Risk Factors and Signs and Symptoms  Related risk factors and signs and symptoms are identified upon initiation of Human Response Clinical Practice Guideline (CPG)   Outcome: Outcome(s) achieved Date Met: 04/09/18  Patient ambulates independently.  Goal: Absence of Falls  Patient will demonstrate the desired outcomes by discharge/transition of care.   Outcome: Outcome(s) achieved Date Met: 04/09/18  Patient has been free from falls this hospitalization.    Problem: Patient Care Overview  Goal: Plan of Care Review  Outcome: Outcome(s) achieved Date Met: 04/09/18  Discussed plan of care with the patient and her spouse, including medications given.  Patient discharging home.

## 2018-04-11 ENCOUNTER — HOSPITAL ENCOUNTER (OUTPATIENT)
Dept: RADIOLOGY | Facility: HOSPITAL | Age: 78
Discharge: HOME | End: 2018-04-11
Attending: INTERNAL MEDICINE
Payer: MEDICARE

## 2018-04-11 ENCOUNTER — OFFICE VISIT (OUTPATIENT)
Dept: HEMATOLOGY/ONCOLOGY | Facility: CLINIC | Age: 78
End: 2018-04-11
Payer: MEDICARE

## 2018-04-11 VITALS
SYSTOLIC BLOOD PRESSURE: 161 MMHG | DIASTOLIC BLOOD PRESSURE: 68 MMHG | OXYGEN SATURATION: 93 % | TEMPERATURE: 98 F | WEIGHT: 159.19 LBS | HEIGHT: 63 IN | HEART RATE: 93 BPM | RESPIRATION RATE: 20 BRPM | BODY MASS INDEX: 28.21 KG/M2

## 2018-04-11 DIAGNOSIS — Z85.3 HISTORY OF BREAST CANCER: ICD-10-CM

## 2018-04-11 DIAGNOSIS — Z85.3 HISTORY OF BREAST CANCER: Primary | ICD-10-CM

## 2018-04-11 PROCEDURE — 77065 DX MAMMO INCL CAD UNI: CPT | Mod: TC,PO,LT

## 2018-04-11 PROCEDURE — 3077F SYST BP >= 140 MM HG: CPT | Mod: CPTII,S$GLB,, | Performed by: INTERNAL MEDICINE

## 2018-04-11 PROCEDURE — 77065 DX MAMMO INCL CAD UNI: CPT | Mod: 26,LT,, | Performed by: RADIOLOGY

## 2018-04-11 PROCEDURE — 3078F DIAST BP <80 MM HG: CPT | Mod: CPTII,S$GLB,, | Performed by: INTERNAL MEDICINE

## 2018-04-11 PROCEDURE — 77061 BREAST TOMOSYNTHESIS UNI: CPT | Mod: TC,PO,LT

## 2018-04-11 PROCEDURE — 77061 BREAST TOMOSYNTHESIS UNI: CPT | Mod: 26,LT,, | Performed by: RADIOLOGY

## 2018-04-11 PROCEDURE — 99999 PR PBB SHADOW E&M-EST. PATIENT-LVL III: CPT | Mod: PBBFAC,,, | Performed by: INTERNAL MEDICINE

## 2018-04-11 PROCEDURE — 99213 OFFICE O/P EST LOW 20 MIN: CPT | Mod: S$GLB,,, | Performed by: INTERNAL MEDICINE

## 2018-04-11 NOTE — PROGRESS NOTES
Subjective:       Patient ID: Myesha Quinones is a 77 y.o. female.    Chief Complaint: No chief complaint on file.    HPI     Recent admission for COPD exacerbation    Returns for annual follow-up, labs and mammogram.  Other than admission she has been doing fairly well in the interval.  Weight stable  Feels she is slowing down with age    This is a 77-year-old female, who returns for her annual followup with a history of breast carcinoma. She is a previous patient of Dr. Amadeo Thomas. She was diagnosed with a right breast carcinoma in 1998 with a 2.5 cm infiltrating, poorly differentiated ductal carcinoma on mastectomy specimen, histologic grade III, nuclear grade II, and mitotic index 1 with negative margins and 2 out of 17 lymph nodes showing metastatic carcinoma.     The patient was treated with 4 cycles of Adriamycin and Cytoxan, followed by 4 cycles of Taxotere. She was then placed on tamoxifen for 5 years based on ER/MA status. She was then switched to Femara, and she completed 5 years in 2009.     Review of Systems   Constitutional: Positive for fatigue (with age). Negative for activity change, appetite change, chills, diaphoresis, fever and unexpected weight change.   HENT: Negative for congestion, dental problem, ear pain, hearing loss, mouth sores, nosebleeds, rhinorrhea, tinnitus and trouble swallowing.    Eyes: Negative for visual disturbance.   Respiratory: Positive for wheezing (off and on with asthma). Negative for cough, chest tightness and shortness of breath (better).         Up and down with asthma   Cardiovascular: Negative for chest pain, palpitations and leg swelling.   Gastrointestinal: Negative for abdominal distention, abdominal pain, blood in stool, constipation, diarrhea, nausea and vomiting.   Genitourinary: Negative for decreased urine volume, difficulty urinating, dysuria, frequency and hematuria.   Musculoskeletal: Positive for arthralgias (chronic, unchanged). Negative for  back pain, gait problem, joint swelling and neck pain.   Skin: Negative for color change, pallor, rash and wound.   Neurological: Negative for dizziness, weakness, light-headedness, numbness (chronic since chemotherapy) and headaches.   Hematological: Negative for adenopathy. Does not bruise/bleed easily.   Psychiatric/Behavioral: Negative for dysphoric mood and sleep disturbance. The patient is not nervous/anxious.        Objective:      Physical Exam   Constitutional: She is oriented to person, place, and time. She appears well-developed and well-nourished. No distress.   Presents with her    HENT:   Head: Normocephalic and atraumatic.   Right Ear: External ear normal.   Left Ear: External ear normal.   Nose: Nose normal.   Mouth/Throat: Oropharynx is clear and moist. No oropharyngeal exudate.   Eyes: Conjunctivae and EOM are normal. Pupils are equal, round, and reactive to light. No scleral icterus. Right pupil is round and reactive. Left pupil is round and reactive.   Neck: Normal range of motion. Neck supple. No thyromegaly present.   Cardiovascular: Normal rate, regular rhythm, normal heart sounds and intact distal pulses.  Exam reveals no gallop and no friction rub.    No murmur heard.  Pulmonary/Chest: Effort normal and breath sounds normal. No respiratory distress. She has no wheezes. She has no rales.   Breasts- no masses, no LAD on left  ICD in place on left  Right breast post mastectomy without evidence of chest wall recurrence   Abdominal: Soft. Bowel sounds are normal. She exhibits no distension and no mass. There is no hepatosplenomegaly. There is no tenderness. There is no rebound and no guarding.   No organomegaly   Musculoskeletal: Normal range of motion. She exhibits no edema, tenderness or deformity.   Lymphadenopathy:        Head (right side): No submandibular adenopathy present.        Head (left side): No submandibular adenopathy present.     She has no cervical adenopathy.        Right  cervical: No superficial cervical, no deep cervical and no posterior cervical adenopathy present.       Left cervical: No superficial cervical, no deep cervical and no posterior cervical adenopathy present.        Right: No inguinal and no supraclavicular adenopathy present.        Left: No inguinal and no supraclavicular adenopathy present.   Neurological: She is alert and oriented to person, place, and time. She has normal strength. No sensory deficit. She exhibits normal muscle tone.   Skin: Skin is warm and dry. No bruising, no lesion, no petechiae and no rash noted. She is not diaphoretic. No erythema. No pallor.   Psychiatric: She has a normal mood and affect. Her behavior is normal. Judgment and thought content normal. Her mood appears not anxious. She does not exhibit a depressed mood.   Nursing note and vitals reviewed.    Labs- reviewed  Imaging- reviewed  Assessment:       1. History of breast cancer        Plan:     1. HEIDI clinically  Knows to call for any issues in the interval   RTC 1 year        Distress Screening Results: Psychosocial Distress screening score of Distress Score: 0 noted and reviewed. No intervention indicated.

## 2018-04-12 ENCOUNTER — PATIENT OUTREACH (OUTPATIENT)
Dept: OTHER | Facility: OTHER | Age: 78
End: 2018-04-12

## 2018-04-12 NOTE — PROGRESS NOTES
Last 5 Patient Entered Readings                                      Current 30 Day Average: 161/72     Recent Readings 4/12/2018 4/11/2018 4/11/2018 4/7/2018 4/6/2018    SBP (mmHg) 146 184 198 161 171    DBP (mmHg) 63 80 88 77 77    Pulse 82 85 88 70 74          Hypertension Digital Medicine Program (HDMP): Health  Follow Up    Called to follow up with patient regarding recent ER visit, elevated readings, and blood pressure cuff. States she feels the cuff is still a little high, but after comparing it with her MD visits she feels slightly better about it. Will continue to monitor.     Lifestyle Modifications:    1.Low sodium diet: no Deferred.     2.Physical activity: no Deferred.     3.Hypotension/Hypertension symptoms: yes Reports fatigue, SOB. States she is improved since hospitalization. States she feels elevated readings are due to steriods and URI.     4.Patient has been compliant with the medication regimen.     Follow up with Mrs. Myesha Quinones completed. No further questions or concerns. I will follow up in a few weeks to assess progress. BP is not at goal. Encouraged patient to call with any questions/conerns.

## 2018-04-16 ENCOUNTER — TELEPHONE (OUTPATIENT)
Dept: HEMATOLOGY/ONCOLOGY | Facility: CLINIC | Age: 78
End: 2018-04-16

## 2018-04-16 NOTE — TELEPHONE ENCOUNTER
Message fwd to MD.         ----- Message from Cam Muñoz sent at 4/16/2018 12:21 PM CDT -----  Contact: [facility]   Will like a call from office in regards to sending Rx for bra and mastectomy    Rx will need diagnostic codes of  C50.919 and Z90.10    Rx is a qty of  3     Contact::[phone]  Fax::[phone]

## 2018-04-20 ENCOUNTER — TELEPHONE (OUTPATIENT)
Dept: NEUROLOGY | Facility: CLINIC | Age: 78
End: 2018-04-20

## 2018-04-20 NOTE — TELEPHONE ENCOUNTER
----- Message from [place] sent at 4/19/2018  2:26 PM CDT -----  Contact: Patient @ [phone]  Patient is requesting a return call concerning the medication (primidone (MYSOLINE) 50 MG Tab ), pls call

## 2018-04-20 NOTE — TELEPHONE ENCOUNTER
Spoken to patient and mentioned that she would like to stop taking primidone, but she would like directions on how to wean off the medication.

## 2018-04-26 ENCOUNTER — PROCEDURE VISIT (OUTPATIENT)
Dept: OPHTHALMOLOGY | Facility: CLINIC | Age: 78
End: 2018-04-26
Payer: MEDICARE

## 2018-04-26 VITALS — SYSTOLIC BLOOD PRESSURE: 159 MMHG | HEART RATE: 83 BPM | DIASTOLIC BLOOD PRESSURE: 80 MMHG

## 2018-04-26 DIAGNOSIS — H35.033 HYPERTENSIVE RETINOPATHY, BILATERAL: ICD-10-CM

## 2018-04-26 DIAGNOSIS — Z79.4 CONTROLLED TYPE 2 DIABETES MELLITUS WITH BOTH EYES AFFECTED BY MILD NONPROLIFERATIVE RETINOPATHY AND MACULAR EDEMA, WITH LONG-TERM CURRENT USE OF INSULIN: Primary | ICD-10-CM

## 2018-04-26 DIAGNOSIS — E11.3213 CONTROLLED TYPE 2 DIABETES MELLITUS WITH BOTH EYES AFFECTED BY MILD NONPROLIFERATIVE RETINOPATHY AND MACULAR EDEMA, WITH LONG-TERM CURRENT USE OF INSULIN: Primary | ICD-10-CM

## 2018-04-26 PROCEDURE — 67028 INJECTION EYE DRUG: CPT | Mod: LT,S$GLB,, | Performed by: OPHTHALMOLOGY

## 2018-04-26 PROCEDURE — 92134 CPTRZ OPH DX IMG PST SGM RTA: CPT | Mod: S$GLB,,, | Performed by: OPHTHALMOLOGY

## 2018-04-26 PROCEDURE — 99499 UNLISTED E&M SERVICE: CPT | Mod: S$GLB,,, | Performed by: OPHTHALMOLOGY

## 2018-04-26 RX ORDER — CHLORTHALIDONE 25 MG/1
25 TABLET ORAL DAILY
Qty: 90 TABLET | Refills: 0 | Status: SHIPPED | OUTPATIENT
Start: 2018-04-26 | End: 2018-07-26 | Stop reason: SDUPTHER

## 2018-04-26 RX ADMIN — Medication 1.25 MG: at 02:04

## 2018-04-26 NOTE — PROGRESS NOTES
"HPI     DLS 3/22/18    Pt here for 1 month OCT.   Pt states occasional floaters OD. Pt denies   flashes or eye pain.       OCT - OD No ME  OS - central ME - mproving    FA - Foveal MA's OS   No NV of NP OU      A/P    1. 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 DME OS  S/p Avastin OS x 2  Foveal MA"s OS - may need chronic pharmacotherapy    Avastin OS    3. HTN Ret OU  BS/BP/chol control    4. PCIOL OU  With small PCO      1 month OCT    Risks, benefits, and alternatives to treatment discussed in detail with the patient.  The patient voiced understanding and wished to proceed with the procedure    Injection Procedure Note:  Diagnosis: DME OS    Topical Proparacaine and Betadine. Conj Prep only  Inject Avastin OS at 6:00 @ 3.5-4mm posterior to limbus  Post Operative Dx: Same  Complications: None  Follow up as above.    "

## 2018-04-26 NOTE — PATIENT INSTRUCTIONS
.POST INTRAVITREAL INJECTION PATIENT INSTRUCTIONS   Below are some guidelines for what to expect after your treatment and additional care instructions.   * you may experience mild discomfort in your eye after the treatment. Your eye usually feels better within 24 to 48 hours after the procedure.   * you have been given drops that numb the surface of your eye.   DO NOT rub or touch your eye, DO NOT wear contacts until numbness goes away.   * you may experience small spots that appear in your field of vision, these are usually caused by an air bubble or from the medication. It taked a few hours or days for these to go away.   * use of sunglasses will help reduce light sensitivity and glare.   * DO NOT swim or put sink water ( tap water ) or swin for at least 24 hours after the injection   * If you have a gritty, burning, irritating or stinging feeling in the injected eye. This may be a result of the antiseptic used. Use artifical tears or eye lubricant ( from over- the- counter from your local pharmacy ). If you have some at home already please check the expiration date, so not to use anything contaminated in your eye. A cool pack over your eye brow above the injected eye may also relieve discomfort.   Call us right away or go to the Emergency Department if you have a dramatic decrease in vision or extreme pain in the eye.   [facility] [phone]

## 2018-04-27 ENCOUNTER — TELEPHONE (OUTPATIENT)
Dept: NEUROLOGY | Facility: CLINIC | Age: 78
End: 2018-04-27

## 2018-04-27 NOTE — TELEPHONE ENCOUNTER
----- Message from Higinio Soler sent at 4/25/2018 11:59 AM CDT -----  Contact: Self @ [phone]  Pt is asking to speak w/ the doctor about discounting primidone (MYSOLINE) 50 MG Tab. Pls call.

## 2018-04-30 ENCOUNTER — HOSPITAL ENCOUNTER (OUTPATIENT)
Dept: RADIOLOGY | Facility: CLINIC | Age: 78
Discharge: HOME | End: 2018-04-30
Attending: INTERNAL MEDICINE
Payer: MEDICARE

## 2018-04-30 ENCOUNTER — OFFICE VISIT (OUTPATIENT)
Dept: CARDIOLOGY | Facility: CLINIC | Age: 78
End: 2018-04-30
Payer: MEDICARE

## 2018-04-30 VITALS
WEIGHT: 158.75 LBS | HEIGHT: 63 IN | HEART RATE: 90 BPM | SYSTOLIC BLOOD PRESSURE: 191 MMHG | BODY MASS INDEX: 28.13 KG/M2 | DIASTOLIC BLOOD PRESSURE: 84 MMHG

## 2018-04-30 DIAGNOSIS — M81.0 POSTMENOPAUSAL BONE LOSS: ICD-10-CM

## 2018-04-30 DIAGNOSIS — Z95.810 BIVENTRICULAR ICD (IMPLANTABLE CARDIOVERTER-DEFIBRILLATOR) IN PLACE: ICD-10-CM

## 2018-04-30 DIAGNOSIS — E78.00 PURE HYPERCHOLESTEROLEMIA: ICD-10-CM

## 2018-04-30 DIAGNOSIS — I42.8 NONISCHEMIC CARDIOMYOPATHY: Primary | ICD-10-CM

## 2018-04-30 DIAGNOSIS — I10 ESSENTIAL HYPERTENSION: ICD-10-CM

## 2018-04-30 DIAGNOSIS — N18.30 CHRONIC KIDNEY DISEASE, STAGE 3, MOD DECREASED GFR: ICD-10-CM

## 2018-04-30 DIAGNOSIS — J45.909 CHRONIC ASTHMA WITHOUT COMPLICATION, UNSPECIFIED ASTHMA SEVERITY, UNSPECIFIED WHETHER PERSISTENT: ICD-10-CM

## 2018-04-30 DIAGNOSIS — I25.10 CORONARY ARTERY DISEASE INVOLVING NATIVE CORONARY ARTERY OF NATIVE HEART WITHOUT ANGINA PECTORIS: ICD-10-CM

## 2018-04-30 DIAGNOSIS — I50.22 CHRONIC SYSTOLIC HEART FAILURE: ICD-10-CM

## 2018-04-30 PROCEDURE — 99499 UNLISTED E&M SERVICE: CPT | Mod: S$GLB,,, | Performed by: INTERNAL MEDICINE

## 2018-04-30 PROCEDURE — 77080 DXA BONE DENSITY AXIAL: CPT | Mod: 26,,, | Performed by: INTERNAL MEDICINE

## 2018-04-30 PROCEDURE — 77080 DXA BONE DENSITY AXIAL: CPT | Mod: TC

## 2018-04-30 PROCEDURE — 3077F SYST BP >= 140 MM HG: CPT | Mod: CPTII,S$GLB,, | Performed by: INTERNAL MEDICINE

## 2018-04-30 PROCEDURE — 99214 OFFICE O/P EST MOD 30 MIN: CPT | Mod: S$GLB,,, | Performed by: INTERNAL MEDICINE

## 2018-04-30 PROCEDURE — 99999 PR PBB SHADOW E&M-EST. PATIENT-LVL III: CPT | Mod: PBBFAC,,, | Performed by: INTERNAL MEDICINE

## 2018-04-30 PROCEDURE — 3079F DIAST BP 80-89 MM HG: CPT | Mod: CPTII,S$GLB,, | Performed by: INTERNAL MEDICINE

## 2018-04-30 RX ORDER — SPIRONOLACTONE 25 MG/1
25 TABLET ORAL 2 TIMES DAILY
COMMUNITY
End: 2018-05-15

## 2018-04-30 RX ORDER — CHOLECALCIFEROL (VITAMIN D3) 50 MCG
1 TABLET ORAL DAILY
COMMUNITY

## 2018-04-30 RX ORDER — NIFEDIPINE 60 MG/1
60 TABLET, EXTENDED RELEASE ORAL DAILY
Qty: 30 TABLET | Refills: 6 | Status: SHIPPED | OUTPATIENT
Start: 2018-04-30 | End: 2018-05-31

## 2018-04-30 NOTE — PATIENT INSTRUCTIONS
`Increase Nifedipine to 60 mg daily . Take 2 of your current 30 mg tablets until gone then the new prescription.

## 2018-04-30 NOTE — PROGRESS NOTES
Subjective:   Patient ID:  Myesha Quinones is a 78 y.o. female who presents for follow-up of Nonischemic cardiomyopathy      HPI:   Myesha Quinones presents for follow up of non-ischemic cardiomyopathy with last EF 50% following CRT. Her major issue recently has been her asthma resulting in an admission and still causing problems. Myesha Quinones denies chest pain, presyncope , or syncope. Myesha Quinones has hypertension with persistent elevation. Myesha Quinones has dyslipidemia  on moderate intensity statin therapy...   .  Review of Systems   Constitution: Negative for weakness, malaise/fatigue, weight gain and weight loss.   Eyes: Negative for blurred vision.   Cardiovascular: Positive for dyspnea on exertion. Negative for chest pain, claudication, cyanosis, irregular heartbeat, leg swelling, near-syncope, orthopnea, palpitations, paroxysmal nocturnal dyspnea and syncope.   Respiratory: Positive for wheezing. Negative for cough and shortness of breath.         KHOA on CPAP   Musculoskeletal: Positive for joint pain. Negative for falls and myalgias.   Gastrointestinal: Positive for constipation. Negative for abdominal pain, heartburn, nausea and vomiting.   Genitourinary: Negative for nocturia.   Neurological: Negative for brief paralysis, dizziness, focal weakness, headaches, numbness and paresthesias.   Psychiatric/Behavioral: Negative for altered mental status.       Current Outpatient Prescriptions   Medication Sig    ACCU-CHEK HECTOR CONTROL SOLN Soln     ACCU-CHEK HECTOR PLUS METER Misc     albuterol 90 mcg/actuation inhaler Inhale 2 puffs into the lungs every 6 (six) hours as needed for Wheezing. Rescue    albuterol-ipratropium 2.5mg-0.5mg/3mL (DUO-NEB) 0.5 mg-3 mg(2.5 mg base)/3 mL nebulizer solution Take 3 mLs by nebulization every 4 (four) hours. Rescue    aspirin (ENTERIC COATED ASPIRIN) 81 MG EC tablet Take 1 tablet by mouth once daily.     azelastine (ASTELIN) 137 mcg (0.1  %) nasal spray 1 spray (137 mcg total) by Nasal route 2 (two) times daily. For severe allergy    blood sugar diagnostic (ACCU-CHEK HECTOR) Strp Uses Accu-Check Hector meter to test BG 4x/day (Patient taking differently: Uses Accu-Check Hector meter to test BG 4x/day checking 2x day)    carvedilol (COREG) 25 MG tablet Take 2 tablets (50 mg total) by mouth 2 (two) times daily.    cetirizine (ZYRTEC) 5 MG tablet Take 1 tablet (5 mg total) by mouth once daily.    chlorthalidone (HYGROTEN) 25 MG Tab TAKE 1 TABLET (25 MG TOTAL) BY MOUTH ONCE DAILY.    cholecalciferol, vitamin D3, (VITAMIN D3) 2,000 unit Tab Take by mouth once daily.    CYANOCOBALAMIN, VITAMIN B-12, (B-12 DOTS ORAL) Take 1 tablet by mouth once daily.    fluticasone-salmeterol 250-50 mcg/dose (ADVAIR) 250-50 mcg/dose diskus inhaler Inhale 1 puff into the lungs 2 (two) times daily. This is a change from one month    glimepiride (AMARYL) 2 MG tablet TAKE 1 TABLET BY MOUTH daily    hydrALAZINE (APRESOLINE) 100 MG tablet TAKE 1 TABLET (100 MG TOTAL) BY MOUTH 3 (THREE) TIMES DAILY.    insulin glargine (LANTUS SOLOSTAR) 100 unit/mL (3 mL) InPn pen Inject 30 Units into the skin every evening.    lancets (ACCU-CHEK SOFTCLIX LANCETS) Misc Uses Accu-Chek Hector meter to test BG 4x/day    lancets 30 gauge Misc     lancing device Misc     linagliptin (TRADJENTA) 5 mg Tab tablet Take 1 tablet (5 mg total) by mouth once daily.    magnesium oxide (MAG-OX) 400 mg tablet Take 1 tablet (400 mg total) by mouth once daily.    metFORMIN (GLUCOPHAGE) 500 MG tablet TAKE 2 TABLETS (1,000 MG TOTAL) BY MOUTH 2 (TWO) TIMES DAILY WITH MEALS.    NIFEdipine (ADALAT CC) 60 MG TbSR Take 1 tablet (60 mg total) by mouth once daily.    nitroGLYCERIN (NITROSTAT) 0.4 MG SL tablet Place 1 tablet under the tongue continuous prn.      omega-3 fatty acids (FISH OIL) 500 mg Cap Take 1 capsule by mouth Twice daily.    pen needle, diabetic (BD INSULIN PEN NEEDLE UF MINI) 31 gauge x  "3/16" Ndle USE WITH LANTUS AT BEDTIME    primidone (MYSOLINE) 50 MG Tab Take as directed. (Patient taking differently: Take 50 mg by mouth 2 (two) times daily. Take as directed.)    sertraline (ZOLOFT) 25 MG tablet Take 1 tablet (25 mg total) by mouth every evening.    simvastatin (ZOCOR) 20 MG tablet TAKE 1 TABLET (20 MG TOTAL) BY MOUTH EVERY EVENING.    spironolactone (ALDACTONE) 25 MG tablet Take 25 mg by mouth 2 (two) times daily.    valsartan (DIOVAN) 320 MG tablet Take 1 tablet (320 mg total) by mouth once daily. (Patient taking differently: Take 320 mg by mouth once daily. Change to night)     No current facility-administered medications for this visit.      Objective:   Physical Exam   Constitutional: She is oriented to person, place, and time. She appears well-developed. No distress.   BP (!) 191/84 (BP Location: Left arm, Patient Position: Sitting, BP Method: X-Large (Automatic))   Pulse 90   Ht 5' 3" (1.6 m)   Wt 72 kg (158 lb 11.7 oz)   BMI 28.12 kg/m²    HENT:   Head: Normocephalic.   Eyes: Conjunctivae are normal. Pupils are equal, round, and reactive to light. No scleral icterus.   Neck: Neck supple. No JVD present. No thyromegaly present.   Cardiovascular: Normal rate, regular rhythm, normal heart sounds and intact distal pulses.  PMI is not displaced.  Exam reveals no gallop and no friction rub.    No murmur heard.  Trace bilateral pedal edema   Pulmonary/Chest: Effort normal. No respiratory distress. She has wheezes. She has no rales.   Right mastectomy   Abdominal: Soft. She exhibits no distension. There is no splenomegaly or hepatomegaly. There is no tenderness.   Musculoskeletal: She exhibits no edema or tenderness.   Gait normal  Right arm lymphedema    Neurological: She is alert and oriented to person, place, and time.   Skin: Skin is warm and dry. She is not diaphoretic.   Psychiatric: She has a normal mood and affect. Her behavior is normal.       Lab Results   Component Value Date "     (L) 04/11/2018    K 4.3 04/11/2018    CL 96 04/11/2018    CO2 25 04/11/2018    BUN 43 (H) 04/11/2018    CREATININE 1.4 04/11/2018     (H) 04/11/2018    HGBA1C 6.7 (H) 04/08/2018    MG 1.8 04/08/2018    AST 23 04/11/2018    ALT 25 04/11/2018    ALBUMIN 3.4 (L) 04/11/2018    PROT 7.0 04/11/2018    BILITOT 0.2 04/11/2018    WBC 13.52 (H) 04/11/2018    HGB 10.9 (L) 04/11/2018    HCT 34.5 (L) 04/11/2018    MCV 92 04/11/2018     04/11/2018    TSH 0.560 08/07/2017    CHOL 125 02/19/2018    HDL 50 02/19/2018    LDLCALC 46.6 (L) 02/19/2018    TRIG 142 02/19/2018       Assessment:     1. Nonischemic cardiomyopathy : EF 50%   2. Chronic systolic heart failure : Compensated   3. Essential hypertension: Not controlled    4. Biventricular ICD (implantable cardioverter-defibrillator) in place    5. Pure hypercholesterolemia :  on moderate intensity statin therapy.   6. Coronary artery disease involving native coronary artery of native heart without angina pectoris: Non-obstructive    7. Chronic asthma without complication, unspecified asthma severity, unspecified whether persistent    8. Chronic kidney disease, stage 3, mod decreased GFR        Plan:     Myesha was seen today for nonischemic cardiomyopathy.    Diagnoses and all orders for this visit:    Nonischemic cardiomyopathy    Chronic systolic heart failure  Continue current regimen    Essential hypertension  -     NIFEdipine (ADALAT CC) 60 MG TbSR; Take 1 tablet (60 mg total) by mouth once daily( an increase ).    Biventricular ICD (implantable cardioverter-defibrillator) in place    Pure hypercholesterolemia  .cont3    Coronary artery disease involving native coronary artery of native heart without angina pectoris    Chronic asthma without complication, unspecified asthma severity, unspecified whether persistent    Chronic kidney disease, stage 3, mod decreased GFR    Other orders  -     spironolactone (ALDACTONE) 25 MG tablet; Take 25 mg by  mouth 2 (two) times daily.  -     cholecalciferol, vitamin D3, (VITAMIN D3) 2,000 unit Tab; Take by mouth once daily.

## 2018-05-01 ENCOUNTER — TELEPHONE (OUTPATIENT)
Dept: INTERNAL MEDICINE | Facility: CLINIC | Age: 78
End: 2018-05-01

## 2018-05-01 ENCOUNTER — HOSPITAL ENCOUNTER (OUTPATIENT)
Dept: RADIOLOGY | Facility: HOSPITAL | Age: 78
Discharge: HOME | End: 2018-05-01
Attending: INTERNAL MEDICINE
Payer: MEDICARE

## 2018-05-01 DIAGNOSIS — E04.1 THYROID NODULE: Primary | ICD-10-CM

## 2018-05-01 DIAGNOSIS — E04.1 THYROID NODULE: ICD-10-CM

## 2018-05-01 PROCEDURE — 76536 US EXAM OF HEAD AND NECK: CPT | Mod: TC

## 2018-05-01 PROCEDURE — 76536 US EXAM OF HEAD AND NECK: CPT | Mod: 26,,, | Performed by: RADIOLOGY

## 2018-05-02 ENCOUNTER — TELEPHONE (OUTPATIENT)
Dept: ELECTROPHYSIOLOGY | Facility: CLINIC | Age: 78
End: 2018-05-02

## 2018-05-02 NOTE — TELEPHONE ENCOUNTER
I spoke with patient and informed her that we pushed her device appointment back so she could see the doctor on the same day. I asked her to send me a manual transmission just to reassure her. Patient stated understanding. She will send a manual transmission tomorrow morning.   ----- Message from Bruno Woodall MA sent at 5/2/2018 12:36 PM CDT -----  Contact: Patient  Pt wanted appt moved back in April to have all appts on the same day so we r/s to July. Pt now is questioning why her appt was moved from may for device bc she was told it had to be checked every 3 months no matter what.  Does pt need an inclinic for may?   Pt  Would like someone with device to call her and explain and schedule device appt for may again if needed  ----- Message -----  From: Krystal Coto  Sent: 5/2/2018  12:24 PM  To: Bruno Woodall MA    Hey, could you please help this patient?  Thanks.  Nima  ----- Message -----  From: Krystal Coto  Sent: 5/2/2018  11:43 AM  To: Krystal Coto        ----- Message -----  From: Ann Marie Garcia  Sent: 5/2/2018  11:32 AM  To: [facility] Arrhythmia Device Staff    The Pt states that she knew she had a appointment in May 28 @ 3pm, but now she has an appointment for July 18 @ 9am that she didn't make. She wants to know why the appointment was changed without her knowledge?  She states that she needs to be seen on her original date because her device battery is weak and is over 5 years old, and she is seen every 3 months not 6.  Please call her back @ [phone]. Thanks, Ann Marie

## 2018-05-03 ENCOUNTER — OFFICE VISIT (OUTPATIENT)
Dept: SLEEP MEDICINE | Facility: CLINIC | Age: 78
End: 2018-05-03
Payer: MEDICARE

## 2018-05-03 VITALS
SYSTOLIC BLOOD PRESSURE: 182 MMHG | DIASTOLIC BLOOD PRESSURE: 85 MMHG | WEIGHT: 159.63 LBS | BODY MASS INDEX: 28.29 KG/M2 | HEIGHT: 63 IN | HEART RATE: 88 BPM

## 2018-05-03 DIAGNOSIS — Z79.4 TYPE 2 DIABETES MELLITUS WITH STAGE 3 CHRONIC KIDNEY DISEASE, WITH LONG-TERM CURRENT USE OF INSULIN: ICD-10-CM

## 2018-05-03 DIAGNOSIS — G47.33 OBSTRUCTIVE SLEEP APNEA: Primary | ICD-10-CM

## 2018-05-03 DIAGNOSIS — E11.22 TYPE 2 DIABETES MELLITUS WITH STAGE 3 CHRONIC KIDNEY DISEASE, WITH LONG-TERM CURRENT USE OF INSULIN: ICD-10-CM

## 2018-05-03 DIAGNOSIS — I42.8 NON-ISCHEMIC CARDIOMYOPATHY: ICD-10-CM

## 2018-05-03 DIAGNOSIS — N18.30 TYPE 2 DIABETES MELLITUS WITH STAGE 3 CHRONIC KIDNEY DISEASE, WITH LONG-TERM CURRENT USE OF INSULIN: ICD-10-CM

## 2018-05-03 DIAGNOSIS — I10 ESSENTIAL HYPERTENSION: ICD-10-CM

## 2018-05-03 PROCEDURE — 99999 PR PBB SHADOW E&M-EST. PATIENT-LVL V: CPT | Mod: PBBFAC,,, | Performed by: NURSE PRACTITIONER

## 2018-05-03 PROCEDURE — 99499 UNLISTED E&M SERVICE: CPT | Mod: S$GLB,,, | Performed by: NURSE PRACTITIONER

## 2018-05-03 PROCEDURE — 99214 OFFICE O/P EST MOD 30 MIN: CPT | Mod: S$GLB,,, | Performed by: NURSE PRACTITIONER

## 2018-05-03 NOTE — PROGRESS NOTES
This 78 y.o. female returns to [facility] Clinic regarding management of obstructive sleep apnea and equipment check. She was last seen in clinic 2016 by MD (last visit me 2012)    She continues to use cpap nightly. Sleep remains consolidated, less disrupted and snoring resolved. Eligible for new mask type and machine. Sleeps well with therapy. Snoring resolved. Uses chin strap. Has soclean machine ready to use with new machine.   Interrogation--manometer 12.4cm. Old nasal mask, old sleepstyle machine  2016 Echo:   Low normal to mildly depressed left ventricular systolic function (EF 50-55%).     2 - Left ventricular diastolic dysfunction.     3 - Severe left atrial enlargement.     4 - Normal right ventricular systolic function .     5 - Trivial to mild tricuspid regurgitation.     6 - Intermediate central venous pressure.     7 - Pulmonary hypertension. The estimated PA systolic pressure is 42 mmHg    4/2018   HgbA1c 6.7      HISTORY  9/26/16  She has been seen in the context of medical comorbidities of cardiomyopathy and hypertension. She previously had sleep symptoms of fragmented sleep with multiple arousals every 2 to 3 minutes and loud snoring. This has resolved with CPAP.    Not snoring with CPAP.  Can't sleep without CPAP. Sleeps better with it. Very fragmented sleep without it.    She is generally satisfied with CPAP at 11 cm on a nightly basis.   Chin strap still annoying but using with her nasal mask.  -She tolerates pressure   -Adequate humidity, heat at 2.5   -occasional oral drying    ESS = 11 (5)    She continues to feel more rested and energized during the day as a result of CPAP.     CPAP interrogtaion: F&P machine; set at 11 cm;   total usage 3468 hours (prior 5690 hours), blowing at 11.0 cm    DME = Access    BASELINE SLEEP STUDY 6/09: AHI 23.7, worse in supine, oxygen abi 77%. 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, with long-term current use of insulin 2/22/2018    COPD (chronic obstructive pulmonary disease)     COPD exacerbation 4/8/2018    Defibrillator discharge     Diabetes mellitus     Diabetes mellitus type II     Diabetes with neurologic complications     Goiter     MNG    HX: breast cancer     Hyperlipidemia     Hypertension     Iron deficiency anemia 5/16/2017    Left kidney mass     Meningioma     Osteoporosis, postmenopausal     Pacemaker     Skin cancer     s/p excision    Sleep apnea     CPAP    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/2015    mid forehead    Unspecified vitamin D deficiency     Ventricular tachycardia     Vitamin B12 deficiency     Vitamin D deficiency disease        EXAM  BP (!) 182/85   Pulse 88   Ht 5' 3" (1.6 m)   Wt 72.4 kg (159 lb 9.6 oz)   BMI 28.27 kg/m²    Well groomed, W/D, W/N      IMPRESSION: Obstructive sleep apnea, severe. Remains adherent with cpap, symptoms improved. Eligible for new machine  She has medical comorbidities of non-ischemic cardiomyopathy, CAD (has ICD), asthma (recent exacerbation), pulmonary hypertension, DM with CKD and long term use insulin      PLAN:   1. CPAP 12 cm of water. [facility] DME. RTC 5-6 wks adherence, assess ahi  2. Continue to see cards re: CAD/cardiomyopathy and endo provider re: DM mgt  3. Discussed etiology of KHOA and potential ramifications of untreated KHOA, including heart disease, hypertension, cognitive difficulties, stroke, and diabetes.    "

## 2018-05-07 ENCOUNTER — TELEPHONE (OUTPATIENT)
Dept: SLEEP MEDICINE | Facility: CLINIC | Age: 78
End: 2018-05-07

## 2018-05-07 NOTE — TELEPHONE ENCOUNTER
"----- Message from Sulma Rodarte sent at 5/7/2018  8:14 AM CDT -----  Contact: Patient herself    Name of Who is Calling: DAVIS COTTRELL [[id number]]      What is the request in detail: Patient called requesting a call. Says, "she has to discuss what happened on last night with her CPAP machine."  Patient wasn't very specific that was all she said.  Please give a call back at your earliest convenience.       THANKS!      Can the clinic reply by [facility]: No      What Number to Call Back if not in [facility]:   [phone]                                    "

## 2018-05-07 NOTE — TELEPHONE ENCOUNTER
Contacted patient and relayed message per Dr. Epps who is covering for SHELBI Dee.    Please call the pt to inform that it is unlikely to have low blood pressure and dizziness from using CPAP machine, however I have decreased her settings a little bit to APAP 9-13 cm H2O.     Patient was advised to call the office tomorrow should there be any other issues.

## 2018-05-08 ENCOUNTER — PATIENT OUTREACH (OUTPATIENT)
Dept: OTHER | Facility: OTHER | Age: 78
End: 2018-05-08

## 2018-05-11 ENCOUNTER — HOSPITAL ENCOUNTER (EMERGENCY)
Facility: HOSPITAL | Age: 78
Discharge: HOME | End: 2018-05-11
Attending: EMERGENCY MEDICINE
Payer: MEDICARE

## 2018-05-11 VITALS
HEART RATE: 84 BPM | HEIGHT: 63 IN | DIASTOLIC BLOOD PRESSURE: 74 MMHG | BODY MASS INDEX: 29.23 KG/M2 | OXYGEN SATURATION: 95 % | SYSTOLIC BLOOD PRESSURE: 169 MMHG | WEIGHT: 165 LBS | TEMPERATURE: 98 F | RESPIRATION RATE: 14 BRPM

## 2018-05-11 DIAGNOSIS — R20.0 NUMBNESS: ICD-10-CM

## 2018-05-11 DIAGNOSIS — D32.0 CEREBRAL MENINGIOMA: ICD-10-CM

## 2018-05-11 DIAGNOSIS — R53.1 WEAKNESS: ICD-10-CM

## 2018-05-11 LAB
ALBUMIN SERPL BCP-MCNC: 3.3 G/DL
ALP SERPL-CCNC: 70 U/L
ALT SERPL W/O P-5'-P-CCNC: 18 U/L
ANION GAP SERPL CALC-SCNC: 10 MMOL/L
AST SERPL-CCNC: 18 U/L
BACTERIA #/AREA URNS AUTO: ABNORMAL /HPF
BASOPHILS # BLD AUTO: 0.08 K/UL
BASOPHILS NFR BLD: 1 %
BILIRUB SERPL-MCNC: 0.2 MG/DL
BILIRUB UR QL STRIP: NEGATIVE
BUN SERPL-MCNC: 25 MG/DL
CALCIUM SERPL-MCNC: 9.3 MG/DL
CHLORIDE SERPL-SCNC: 103 MMOL/L
CHOLEST SERPL-MCNC: 130 MG/DL
CHOLEST/HDLC SERPL: 2.8 {RATIO}
CLARITY UR REFRACT.AUTO: CLEAR
CO2 SERPL-SCNC: 24 MMOL/L
COLOR UR AUTO: YELLOW
CREAT SERPL-MCNC: 1.3 MG/DL
DIFFERENTIAL METHOD: ABNORMAL
EOSINOPHIL # BLD AUTO: 0.5 K/UL
EOSINOPHIL NFR BLD: 5.5 %
ERYTHROCYTE [DISTWIDTH] IN BLOOD BY AUTOMATED COUNT: 13.2 %
EST. GFR  (AFRICAN AMERICAN): 45.1 ML/MIN/1.73 M^2
EST. GFR  (NON AFRICAN AMERICAN): 39.1 ML/MIN/1.73 M^2
GLUCOSE SERPL-MCNC: 183 MG/DL
GLUCOSE UR QL STRIP: ABNORMAL
HCT VFR BLD AUTO: 35.3 %
HDLC SERPL-MCNC: 47 MG/DL
HDLC SERPL: 36.2 %
HGB BLD-MCNC: 11.3 G/DL
HGB UR QL STRIP: NEGATIVE
HYALINE CASTS UR QL AUTO: 6 /LPF
IMM GRANULOCYTES # BLD AUTO: 0.08 K/UL
IMM GRANULOCYTES NFR BLD AUTO: 1 %
INR PPP: 1
KETONES UR QL STRIP: NEGATIVE
LDLC SERPL CALC-MCNC: 65 MG/DL
LEUKOCYTE ESTERASE UR QL STRIP: ABNORMAL
LYMPHOCYTES # BLD AUTO: 1.7 K/UL
LYMPHOCYTES NFR BLD: 20.4 %
MCH RBC QN AUTO: 29.5 PG
MCHC RBC AUTO-ENTMCNC: 32 G/DL
MCV RBC AUTO: 92 FL
MICROSCOPIC COMMENT: ABNORMAL
MONOCYTES # BLD AUTO: 0.6 K/UL
MONOCYTES NFR BLD: 7 %
NEUTROPHILS # BLD AUTO: 5.5 K/UL
NEUTROPHILS NFR BLD: 65.1 %
NITRITE UR QL STRIP: NEGATIVE
NONHDLC SERPL-MCNC: 83 MG/DL
NRBC BLD-RTO: 0 /100 WBC
PH UR STRIP: 5 [PH] (ref 5–8)
PLATELET # BLD AUTO: 232 K/UL
PMV BLD AUTO: 11 FL
POTASSIUM SERPL-SCNC: 4.2 MMOL/L
PROT SERPL-MCNC: 6.7 G/DL
PROT UR QL STRIP: ABNORMAL
PROTHROMBIN TIME: 10.5 SEC
RBC # BLD AUTO: 3.83 M/UL
RBC #/AREA URNS AUTO: 1 /HPF (ref 0–4)
SODIUM SERPL-SCNC: 137 MMOL/L
SP GR UR STRIP: 1.01 (ref 1–1.03)
SQUAMOUS #/AREA URNS AUTO: 1 /HPF
TRIGL SERPL-MCNC: 90 MG/DL
TROPONIN I SERPL DL<=0.01 NG/ML-MCNC: <0.006 NG/ML
TSH SERPL DL<=0.005 MIU/L-ACNC: 0.48 UIU/ML
URN SPEC COLLECT METH UR: ABNORMAL
UROBILINOGEN UR STRIP-ACNC: NEGATIVE EU/DL
WBC # BLD AUTO: 8.38 K/UL
WBC #/AREA URNS AUTO: 1 /HPF (ref 0–5)

## 2018-05-11 PROCEDURE — 85610 PROTHROMBIN TIME: CPT

## 2018-05-11 PROCEDURE — 84443 ASSAY THYROID STIM HORMONE: CPT

## 2018-05-11 PROCEDURE — 81001 URINALYSIS AUTO W/SCOPE: CPT

## 2018-05-11 PROCEDURE — 80061 LIPID PANEL: CPT

## 2018-05-11 PROCEDURE — 84484 ASSAY OF TROPONIN QUANT: CPT

## 2018-05-11 PROCEDURE — 99285 EMERGENCY DEPT VISIT HI MDM: CPT | Mod: 25

## 2018-05-11 PROCEDURE — 93010 ELECTROCARDIOGRAM REPORT: CPT | Mod: ,,, | Performed by: INTERNAL MEDICINE

## 2018-05-11 PROCEDURE — 99285 EMERGENCY DEPT VISIT HI MDM: CPT | Mod: ,,, | Performed by: EMERGENCY MEDICINE

## 2018-05-11 PROCEDURE — 85025 COMPLETE CBC W/AUTO DIFF WBC: CPT

## 2018-05-11 PROCEDURE — 87086 URINE CULTURE/COLONY COUNT: CPT

## 2018-05-11 PROCEDURE — 87186 SC STD MICRODIL/AGAR DIL: CPT

## 2018-05-11 PROCEDURE — 80053 COMPREHEN METABOLIC PANEL: CPT

## 2018-05-11 PROCEDURE — 99285 EMERGENCY DEPT VISIT HI MDM: CPT | Mod: ,,, | Performed by: PHYSICIAN ASSISTANT

## 2018-05-11 PROCEDURE — 87088 URINE BACTERIA CULTURE: CPT

## 2018-05-11 PROCEDURE — 87077 CULTURE AEROBIC IDENTIFY: CPT

## 2018-05-11 NOTE — ED PROVIDER NOTES
"Encounter Date: 5/11/2018    SCRIBE #1 NOTE: I, Latoya Bello, am scribing for, and in the presence of,  Dr. Heath. I have scribed the following portions of the note - the APC attestation and the EKG reading.       History     Chief Complaint   Patient presents with    Numbness     my whole body is numb, started at 5 am states R side numbness worse than L side,     Fatigue     weak all over     Patient is a 78-year-old female with a past medical history of CAD, with a defibrillator, history of breast cancer status post mastectomy on the right, COPD, DM 2, HTN, BEULAH, and meningioma who presents the ED with weakness, numbness, and headache.  Patient states that her headache woke her up around 05:00.  She noted right upper extremity paresthesias that is new to her.  She states that she has bilateral lower extremity numbness and bilateral hand numbness that is consistent with her neuropathy, but having paresthesias to her entire right arm is new.  She endorses generalized weakness and fatigue for the past 3 days.  She has no history of a headache. She went to sleep around 11 30-00:00 last night normal.  Patient and patient's family drove from [place] back to [place] this morning prior to checking into the ED.  She endorses intermittent dysuria for "a while".  No urinary frequency or hematuria, blood in stool.  She has a chronic cough that is unchanged from baseline.  No chest pain or SOB.  She takes baby aspirin. Patient states overall, she feels better and her symptoms are slowing resolving.          Review of patient's allergies indicates:   Allergen Reactions    Iodine and iodide containing products Hives     Past Medical History:   Diagnosis Date    Allergy     Asthma     Basal cell carcinoma     left forehead    Basal cell carcinoma     left nose    Basal cell carcinoma 05/20/2015    right nose    Basal cell carcinoma 12/22/2015    left lower post neck    Breast cancer     CAD (coronary artery " disease)     Cardiomyopathy     Cardiomyopathy, ischemic     Cataract     CHF (congestive heart failure)     Chronic kidney disease, stage 3, mod decreased GFR 2/14/2017    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, with long-term current use of insulin 2/22/2018    COPD (chronic obstructive pulmonary disease)     COPD exacerbation 4/8/2018    Defibrillator discharge     Diabetes mellitus     Diabetes mellitus type II     Diabetes with neurologic complications     Goiter     MNG    HX: breast cancer     Hyperlipidemia     Hypertension     Iron deficiency anemia 5/16/2017    Left kidney mass     Meningioma     Osteoporosis, postmenopausal     Pacemaker     Skin cancer     s/p excision    Sleep apnea     CPAP    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/2015    mid forehead    Unspecified vitamin D deficiency     Ventricular tachycardia     Vitamin B12 deficiency     Vitamin D deficiency disease      Past Surgical History:   Procedure Laterality Date    BASAL CELL CARCINOMA EXCISION      posterior neck and nose    BREAST BIOPSY      BREAST CYST EXCISION Left     BREAST SURGERY      CARDIAC DEFIBRILLATOR PLACEMENT      x 2    CATARACT EXTRACTION W/  INTRAOCULAR LENS IMPLANT Bilateral     CHOLECYSTECTOMY      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    FRACTURE SURGERY      left elbow and wrist as a child    HYSTERECTOMY      MASTECTOMY Right     SQUAMOUS CELL CARCINOMA EXCISION      remved from forehead    TONSILLECTOMY       Family History   Problem Relation Age of Onset    Diabetes Father     Heart disease Father     Diabetes Sister     Heart disease Sister     Diabetes Brother     Heart disease Brother     Hypertension Brother     Diabetes Brother     Heart disease Brother     Hypertension Brother     Diabetes Brother     Heart disease Brother     Cancer Brother         colon    Diabetes Brother      Cancer Son         skin    Diabetes Son         prediabetes    Diabetes Daughter         prediabetes    Cancer Daughter         melanoma    Obesity Daughter     Melanoma Daughter     Diabetes Son     Asthma Mother     Hypertension Mother     Stroke Mother     No Known Problems Maternal Grandmother     No Known Problems Maternal Grandfather     No Known Problems Paternal Grandmother     No Known Problems Paternal Grandfather     Amblyopia Neg Hx     Blindness Neg Hx     Cataracts Neg Hx     Glaucoma Neg Hx     Macular degeneration Neg Hx     Retinal detachment Neg Hx     Strabismus Neg Hx     Thyroid disease Neg Hx      Social History   Substance Use Topics    Smoking status: Never Smoker    Smokeless tobacco: Never Used    Alcohol use No     Review of Systems   Constitutional: Positive for fatigue. Negative for chills and fever.   HENT: Negative for sore throat.    Eyes: Negative for visual disturbance.   Respiratory: Positive for cough. Negative for shortness of breath.    Cardiovascular: Negative for chest pain.   Gastrointestinal: Negative for abdominal pain, diarrhea, nausea and vomiting.   Genitourinary: Positive for dysuria.   Musculoskeletal: Negative for back pain and myalgias.   Skin: Negative for rash.   Neurological: Positive for weakness, numbness and headaches. Negative for dizziness and light-headedness.   Hematological: Does not bruise/bleed easily.       Physical Exam     Initial Vitals [05/11/18 1008]   BP Pulse Resp Temp SpO2   (!) 146/67 78 18 97.7 °F (36.5 °C) 96 %      MAP       93.33         Physical Exam    Vitals reviewed.  Constitutional: She appears well-developed and well-nourished. She is not diaphoretic. No distress.   HENT:   Head: Normocephalic and atraumatic.   Nose: Nose normal.   Eyes: Conjunctivae and EOM are normal.   Neck: Normal range of motion.   Cardiovascular: Normal rate, regular rhythm and normal heart sounds. Exam reveals no friction rub.    No  murmur heard.  Pulmonary/Chest: Breath sounds normal. No respiratory distress. She has no wheezes. She has no rales.   Abdominal: Soft. Bowel sounds are normal. She exhibits no distension. There is no tenderness. There is no rebound.   Musculoskeletal: Normal range of motion.   Neurological: She is alert and oriented to person, place, and time. She has normal strength. No sensory deficit.   Answering questions appropriately.  No facial asymmetry.  Good finger .  Full range of motion of upper and lower extremities with 5/5 strength and symmetric. Normal finger to nose, pincer finger movements, alternating rapid hand movements, and heel-to-shin.   Skin: Skin is warm and dry. No erythema. No pallor.   Psychiatric: She has a normal mood and affect. Her behavior is normal. Judgment and thought content normal.         ED Course   Procedures  Labs Reviewed   CBC W/ AUTO DIFFERENTIAL - Abnormal; Notable for the following:        Result Value    RBC 3.83 (*)     Hemoglobin 11.3 (*)     Hematocrit 35.3 (*)     Immature Granulocytes 1.0 (*)     Immature Grans (Abs) 0.08 (*)     All other components within normal limits   COMPREHENSIVE METABOLIC PANEL - Abnormal; Notable for the following:     Glucose 183 (*)     BUN, Bld 25 (*)     Albumin 3.3 (*)     eGFR if  45.1 (*)     eGFR if non  39.1 (*)     All other components within normal limits   URINALYSIS, REFLEX TO URINE CULTURE - Abnormal; Notable for the following:     Protein, UA 2+ (*)     Glucose, UA 2+ (*)     Leukocytes, UA 1+ (*)     All other components within normal limits    Narrative:     Preferred Collection Type->Urine, Clean Catch   URINALYSIS MICROSCOPIC - Abnormal; Notable for the following:     Hyaline Casts, UA 6 (*)     All other components within normal limits    Narrative:     Preferred Collection Type->Urine, Clean Catch   CULTURE, URINE   CULTURE, URINE   PROTIME-INR   TROPONIN I   TSH   LIPID PANEL     EKG Readings:  (Independently Interpreted)   Ventricular paced rhythm of 80          Medical Decision Making:   History:   Old Medical Records: I decided to obtain old medical records.  Independently Interpreted Test(s):   I have ordered and independently interpreted EKG Reading(s) - see prior notes  Clinical Tests:   Lab Tests: Ordered and Reviewed  Radiological Study: Ordered and Reviewed  Medical Tests: Ordered and Reviewed    Imaging Results          X-Ray Chest 1 View (Final result)  Result time 05/11/18 11:22:49    Final result by Jovon Martinez MD (05/11/18 11:22:49)                 Impression:      No significant detrimental interval change in the appearance of the chest since 04/08/2018 is appreciated.      Electronically signed by: Jovon Martinez MD  Date:    05/11/2018  Time:    11:22             Narrative:    EXAMINATION:  XR CHEST 1 VIEW    COMPARISON:  Comparison is made to 04/08/2018.    FINDINGS:  Cardioverter/defibrillator again identified.  The heart does not appear significantly enlarged, and the appearance of the cardiomediastinal silhouette and pulmonary vascularity demonstrate no significant detrimental change since the examination referenced above.  Lung zones are stable as well, and free of superimposed airspace consolidation or volume loss.  No pleural fluid.  No pneumothorax.                               CT Head Without Contrast (Final result)  Result time 05/11/18 11:34:41    Final result by Jorge Neumann MD (05/11/18 11:34:41)                 Impression:      No acute abnormality.    Mild findings of chronic microvascular ischemic disease.    Unchanged partially calcified extra-axial lesion underlying the left parietal bone suggestive of meningioma.      Electronically signed by: Jorge Neumann MD  Date:    05/11/2018  Time:    11:34             Narrative:    EXAMINATION:  CT HEAD WITHOUT CONTRAST    CLINICAL HISTORY:  Right greater than left numbness;    TECHNIQUE:  Low dose axial CT images obtained throughout  the head without intravenous contrast. Sagittal and coronal reconstructions were performed.    COMPARISON:  CT temporal bone 01/31/2018.  CT head 11/16/2017.    FINDINGS:  Intracranial compartment:    Mild patchy and confluent periventricular and subcortical white matter hypoattenuation likely relates to sequela of chronic microvascular ischemic disease.  The brain parenchyma appears otherwise normal.  No parenchymal mass, hemorrhage, edema or major vascular distribution infarct.    2.8 x 1.3 cm partially calcified dural-based lesion extending from the left parietal bone with associated overlying hyperostosis is unchanged.  There is mild associated mass effect on the adjacent underlying structures.  Ventricles and sulci are normal in size for age without evidence of hydrocephalus.    No extra-axial blood or fluid collections.    Skull/extracranial contents (limited evaluation): No fracture. Moderate mucosal thickening is present along the floor of the left sphenoid sinus with somewhat lobular configuration raising question of mucous retention cyst or polyp.  Bilateral temporomandibular joint degeneration is present.                                   APC / Resident Notes:   Patient is a 78-year-old female who presents the ED with a 3 day history of weakness, fatigue, and a 1 day history onset this morning with right upper extremity paresthesias and headache. She states her symptoms are slowly resolving. She has a hx of diabetic neuropathy.    On exam, vital signs stable.  NAD and nontoxic appearing.  No neurological deficits appreciated. Answering questions appropriately.    DDX includes but is not limited to intracerebral lesion/hemorrhage/mass, electrolyte abnormalities, UTI, kidney injury, dehydration, neuropathy, viral URI, pneumonia.  Will initiate workup, obtain CT head without contrast and chest x-ray, and continue to monitor.    UA with no signs of infection.  Urine culture added on.  Will not treat for UTI  at this time given chronic intermittent dysuria and clean UA.  CBC with no leukocytosis.  Normocytic anemia with H/H 11.3/35.3  CMP with no electrolyte abnormalities.  Glucose of 183.  BUN/creatinine at 25/1.3.  Negative troponin.  TSH WNL at 0.4.  Chest x-ray with no significant interval change.  CT head with no acute abnormality.  Unchanged partially calcified lesion underlying the left parietal bone suggested a meningioma.    On chart review, patient had neurosurgical evaluation in 2013.  She has had this meningioma for many years now.  CT today does not show any change.  Will consult Neurosurgery for recommendations.    1:28 PM  Neurosurgery does not feel that patient needs any emergent neurosurgical intervention at this time. Her meningioma has been stable for many years and CT head does not show any signs of acute process.  Will have patient follow up with Neurosurgery in the outpatient setting.  Consult greatly appreciated.    1:52 PM  Patient updated with results.  Low suspicion for TIA/CVA given no neurological deficits appreciated. Her symptoms are most likely due to neuropathy vs. viral syndrome. Patient to follow up closely with neurosurgery, PCP, and Cardiology. Rest. Stay hydrated.  Strict ED return precautions given for new or worsening symptoms, and patient and her family voiced their understanding.  All questions answered.  Patient is comfortable with plan and stable for discharge. I have reviewed patient's chart discuss this case with my supervising MD.       Scribe Attestation:   Scribe #1: I performed the above scribed service and the documentation accurately describes the services I performed. I attest to the accuracy of the note.    Attending Attestation:     Physician Attestation Statement for NP/PA:   I have conducted a face to face encounter with this patient in addition to the NP/PA, due to Medical Complexity    Other NP/PA Attestation Additions:    History of Present Illness: 77 y/o F  Bib  family for weakness and worsened numbness/tingling in all extremities greatest rt ue.   Physical Exam: Normal neuro exam   Medical Decision Making: Non focal neuro exam - low suspicion CVA given bilateral and generalized symptoms more likely infectious vs metabolic. Routine bloodwork and CTh.    CT head with stable lt parietal menngioma.  Unremarkable bloodwork  UA with blood and wbc- no sx uti- urine culture sent                    Clinical Impression:   Diagnoses of Numbness and Weakness were pertinent to this visit.    Disposition:   Disposition: Discharged  Condition: Stable         Viry Art PA-C  05/11/18 1916       Donna Heath MD  05/11/18 [id number]

## 2018-05-11 NOTE — ED TRIAGE NOTES
Pt reports she woke up around 5 am feeling weak. Pt reports she is having numbness in her right arm and hand and down her right leg.

## 2018-05-11 NOTE — HPI
Myesha Quinones is a 78 y.o. female with a known history of calcified R temporal meningioma who presents with a several hour history of RUE, facial paresthesias and generalized weakness onset at 0500 today. RUE paresthesias have since resolved, but facial paresthesias and generalized weakness have persisted. She denies blurred vision, focal weakness, gait imbalance, or n/v. No loss of bladder/bowel control, tongue biting, or LOC.

## 2018-05-11 NOTE — ASSESSMENT & PLAN NOTE
78 y.o. female with known calcified meningioma and new onset paresthesias and generalized weakness.    - Neurologically stable.  - CT shows stable size of calcified meningioma without significant surrounding vasogenic edema. At this time, feel unlikely that this is the source of her symptoms.  - Consider consultation with vascular neurology for further workup.  - Will have her follow up in clinic in two weeks to reassess symptoms.

## 2018-05-11 NOTE — CONSULTS
[facility]  Neurosurgery  Consult Note    Inpatient consult to Neurosurgery  Consult performed by: MACRINA STERLING  Consult ordered by: CIERRA MORALES        Subjective:     Chief Complaint/Reason for Admission: Paresthesias and meningioma    History of Present Illness: Myesha Quinones is a 78 y.o. female with a known history of calcified R temporal meningioma who presents with a several hour history of RUE, facial paresthesias and generalized weakness onset at 0500 today. RUE paresthesias have since resolved, but facial paresthesias and generalized weakness have persisted. She denies blurred vision, focal weakness, gait imbalance, or n/v. No loss of bladder/bowel control, tongue biting, or LOC.      (Not in a hospital admission)    Review of patient's allergies indicates:   Allergen Reactions    Iodine and iodide containing products Hives       Past Medical History:   Diagnosis Date    Allergy     Asthma     Basal cell carcinoma     left forehead    Basal cell carcinoma     left nose    Basal cell carcinoma 05/20/2015    right nose    Basal cell carcinoma 12/22/2015    left lower post neck    Breast cancer     CAD (coronary artery disease)     Cardiomyopathy     Cardiomyopathy, ischemic     Cataract     CHF (congestive heart failure)     Chronic kidney disease, stage 3, mod decreased GFR 2/14/2017    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, with long-term current use of insulin 2/22/2018    COPD (chronic obstructive pulmonary disease)     COPD exacerbation 4/8/2018    Defibrillator discharge     Diabetes mellitus     Diabetes mellitus type II     Diabetes with neurologic complications     Goiter     MNG    HX: breast cancer     Hyperlipidemia     Hypertension     Iron deficiency anemia 5/16/2017    Left kidney mass     Meningioma     Osteoporosis, postmenopausal     Pacemaker     Skin  cancer     s/p excision    Sleep apnea     CPAP    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/2015    mid forehead    Unspecified vitamin D deficiency     Ventricular tachycardia     Vitamin B12 deficiency     Vitamin D deficiency disease      Past Surgical History:   Procedure Laterality Date    BASAL CELL CARCINOMA EXCISION      posterior neck and nose    BREAST BIOPSY      BREAST CYST EXCISION Left     BREAST SURGERY      CARDIAC DEFIBRILLATOR PLACEMENT      x 2    CATARACT EXTRACTION W/  INTRAOCULAR LENS IMPLANT Bilateral     CHOLECYSTECTOMY      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    FRACTURE SURGERY      left elbow and wrist as a child    HYSTERECTOMY      MASTECTOMY Right     SQUAMOUS CELL CARCINOMA EXCISION      remved from forehead    TONSILLECTOMY       Family History     Problem Relation (Age of Onset)    Asthma Mother    Cancer Brother, Son, Daughter    Diabetes Father, Sister, Brother, Brother, Brother, Brother, Son, Daughter, Son    Heart disease Father, Sister, Brother, Brother, Brother    Hypertension Brother, Brother, Mother    Melanoma Daughter    No Known Problems Maternal Grandmother, Maternal Grandfather, Paternal Grandmother, Paternal Grandfather    Obesity Daughter    Stroke Mother        Social History Main Topics    Smoking status: Never Smoker    Smokeless tobacco: Never Used    Alcohol use No    Drug use: No    Sexual activity: Yes     Partners: Male     Review of Systems   Constitutional: no fever, chills or night sweats. No changes in weight   Eyes: no visual changes   ENT: no nasal congestion or sore throat   Respiratory: no cough or shortness of breath   Cardiovascular: no chest pain or palpitations   Gastrointestinal: no nausea or vomiting   Genitourinary: no hematuria or dysuria   Integument/Breast: no rash or pruritis   Hematologic/Lymphatic: no easy bruising or lymphadenopathy   Musculoskeletal: no arthralgias or myalgias.   Neurological: no seizures or tremors,  +RUE paresthesias, facial paresthesias  Behavioral/Psych: no auditory or visual hallucinations   Endocrine: no heat or cold intolerance     Objective:     Weight: 74.8 kg (165 lb)  Body mass index is 29.23 kg/m².  Vital Signs (Most Recent):  Temp: 97.7 °F (36.5 °C) (05/11/18 1008)  Pulse: 84 (05/11/18 1300)  Resp: 14 (05/11/18 1300)  BP: (!) 169/74 (05/11/18 1300)  SpO2: 95 % (05/11/18 1300) Vital Signs (24h Range):  Temp:  [97.7 °F (36.5 °C)] 97.7 °F (36.5 °C)  Pulse:  [78-88] 84  Resp:  [14-20] 14  SpO2:  [94 %-97 %] 95 %  BP: (146-184)/(67-80) 169/74                           Neurosurgery Physical Exam  General: well developed, well nourished, no distress.   Head: normocephalic, atraumatic  Neurologic: Alert and oriented. Thought content appropriate.  GCS: Motor: 6/Verbal: 5/Eyes: 4 GCS Total: 15  Mental Status: Awake, Alert, Oriented x 4  Language: No aphasia  Speech: No dysarthria  Cranial nerves: face symmetric, tongue midline, CN II-XII grossly intact.   Eyes: pupils equal, round, reactive to light with accomodation, EOMI.  Pulmonary: normal respirations, no signs of respiratory distress  Abdomen: soft, non-distended, not tender to palpation  Sensory: intact to light touch throughout, except decreased in the lower face and below the jaw line bilaterally.     Motor Strength: Moves all extremities spontaneously with good tone.  Full strength upper and lower extremities. No abnormal movements seen.     DTR's: 2 + and symmetric in UE and LE  Pronator Drift: no drift noted  Finger-to-nose: Intact bilaterally  Dc: absent  Clonus: absent  Babinski: absent  Pulses: 2+ and symmetric radial and dorsalis pedis.  Skin: Skin is warm, dry and intact.      Significant Labs:    Recent Labs  Lab 05/11/18  1025   *      K 4.2      CO2 24   BUN 25*   CREATININE 1.3   CALCIUM 9.3       Recent Labs  Lab 05/11/18  1025   WBC 8.38   HGB 11.3*   HCT 35.3*          Recent Labs  Lab 05/11/18  1025   INR  1.0     Microbiology Results (last 7 days)     Procedure Component Value Units Date/Time    Urine culture [[id number]] Collected:  05/11/18 0100    Order Status:  No result Specimen:  Urine Updated:  05/11/18 1232    Urine culture [[id number]]     Order Status:  Completed Specimen:  Urine         Significant Diagnostics:  CT head 05/11/2018 reviewed.  Unchanged partially calcified extra-axial lesion underlying the left parietal bone suggestive of meningioma compared to previous imaging.    Assessment/Plan:     Cerebral meningioma    78 y.o. female with known calcified meningioma and new onset paresthesias and generalized weakness.    - Neurologically stable.  - CT shows stable size of calcified meningioma without significant surrounding vasogenic edema. At this time, feel unlikely that this is the source of her symptoms.  - Consider consultation with vascular neurology for further workup.  - Will have her follow up in clinic in two weeks to reassess symptoms.              Nelli Merlos PA-C  Neurosurgery  [facility]

## 2018-05-11 NOTE — ED NOTES
Hourly rounding complete. Patient resting comfortably in stretcher. Patient awake and alert, respirations even and unlabored. No acute distress noted. Updated on plan of care. Denies pain. Patient ambulatory to bathroom with assistance without difficulty, assisted patient back into stretcher. Bed in lowest position, locked, side rails up x 2, and call bell in reach.

## 2018-05-11 NOTE — DISCHARGE INSTRUCTIONS
Rest.  Stay hydrated.  Continue all medication as directed.  Follow up closely with Neurosurgery.  They will call you with an appointment, but if you do not hear from them in the next few days, please give them a call.  Follow up closely with your primary care physician and cardiologist.  Return to the emergency department for new or worsening symptoms.    Future Appointments  Date Time Provider Department Center   5/21/2018 8:00 AM Emelina Gaston MD [facility] IM [address] PCW   5/29/2018 9:15 AM Katey Gray DPM [facility] POD [place]   5/31/2018 1:30 PM CARRIE Arechiga MD [facility] OPHTHAL [place]   6/14/2018 8:00 AM Kamini Dee NP [facility] SLEEP [place]   6/26/2018 9:40 AM Brittany Metcalf MD [facility] MARIAM [place] [facility]   7/9/2018 7:30 AM LAB, JANES KEN[facility] LAB [place]   7/16/2018 8:30 AM Olivia Rivera, APRN,ANP-C [facility] ENDODIA [address]   7/18/2018 8:30 AM EKG, APPT N[facility] EKG [address]   7/18/2018 9:00 AM COORDINATED DEVICE CHECK [facility] ARRHYTH [address]   7/18/2018 10:40 AM Jan Mckeon MD N[facility]TH [address]   7/30/2018 10:30 AM Jose Luis Fernando MD [facility] CARDIO [address]   8/23/2018 9:00 AM Veronica Patel NP [facility] ENDODIA [facility]       Our goal in the emergency department is to always give you outstanding care and exceptional service. You may receive a survey by mail or e-mail in the next week regarding your experience in our ED. We would greatly appreciate your completing and returning the survey. Your feedback provides us with a way to recognize our staff who give very good care and it helps us learn how to improve when your experience was below our aspiration of excellence.

## 2018-05-11 NOTE — SUBJECTIVE & OBJECTIVE
(Not in a hospital admission)    Review of patient's allergies indicates:   Allergen Reactions    Iodine and iodide containing products Hives       Past Medical History:   Diagnosis Date    Allergy     Asthma     Basal cell carcinoma     left forehead    Basal cell carcinoma     left nose    Basal cell carcinoma 05/20/2015    right nose    Basal cell carcinoma 12/22/2015    left lower post neck    Breast cancer     CAD (coronary artery disease)     Cardiomyopathy     Cardiomyopathy, ischemic     Cataract     CHF (congestive heart failure)     Chronic kidney disease, stage 3, mod decreased GFR 2/14/2017    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, with long-term current use of insulin 2/22/2018    COPD (chronic obstructive pulmonary disease)     COPD exacerbation 4/8/2018    Defibrillator discharge     Diabetes mellitus     Diabetes mellitus type II     Diabetes with neurologic complications     Goiter     MNG    HX: breast cancer     Hyperlipidemia     Hypertension     Iron deficiency anemia 5/16/2017    Left kidney mass     Meningioma     Osteoporosis, postmenopausal     Pacemaker     Skin cancer     s/p excision    Sleep apnea     CPAP    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/2015    mid forehead    Unspecified vitamin D deficiency     Ventricular tachycardia     Vitamin B12 deficiency     Vitamin D deficiency disease      Past Surgical History:   Procedure Laterality Date    BASAL CELL CARCINOMA EXCISION      posterior neck and nose    BREAST BIOPSY      BREAST CYST EXCISION Left     BREAST SURGERY      CARDIAC DEFIBRILLATOR PLACEMENT      x 2    CATARACT EXTRACTION W/  INTRAOCULAR LENS IMPLANT Bilateral     CHOLECYSTECTOMY      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    FRACTURE SURGERY      left elbow and wrist as a child    HYSTERECTOMY      MASTECTOMY Right     SQUAMOUS CELL CARCINOMA  EXCISION      remved from forehead    TONSILLECTOMY       Family History     Problem Relation (Age of Onset)    Asthma Mother    Cancer Brother, Son, Daughter    Diabetes Father, Sister, Brother, Brother, Brother, Brother, Son, Daughter, Son    Heart disease Father, Sister, Brother, Brother, Brother    Hypertension Brother, Brother, Mother    Melanoma Daughter    No Known Problems Maternal Grandmother, Maternal Grandfather, Paternal Grandmother, Paternal Grandfather    Obesity Daughter    Stroke Mother        Social History Main Topics    Smoking status: Never Smoker    Smokeless tobacco: Never Used    Alcohol use No    Drug use: No    Sexual activity: Yes     Partners: Male     Review of Systems   Constitutional: no fever, chills or night sweats. No changes in weight   Eyes: no visual changes   ENT: no nasal congestion or sore throat   Respiratory: no cough or shortness of breath   Cardiovascular: no chest pain or palpitations   Gastrointestinal: no nausea or vomiting   Genitourinary: no hematuria or dysuria   Integument/Breast: no rash or pruritis   Hematologic/Lymphatic: no easy bruising or lymphadenopathy   Musculoskeletal: no arthralgias or myalgias.   Neurological: no seizures or tremors, +RUE paresthesias, facial paresthesias  Behavioral/Psych: no auditory or visual hallucinations   Endocrine: no heat or cold intolerance     Objective:     Weight: 74.8 kg (165 lb)  Body mass index is 29.23 kg/m².  Vital Signs (Most Recent):  Temp: 97.7 °F (36.5 °C) (05/11/18 1008)  Pulse: 84 (05/11/18 1300)  Resp: 14 (05/11/18 1300)  BP: (!) 169/74 (05/11/18 1300)  SpO2: 95 % (05/11/18 1300) Vital Signs (24h Range):  Temp:  [97.7 °F (36.5 °C)] 97.7 °F (36.5 °C)  Pulse:  [78-88] 84  Resp:  [14-20] 14  SpO2:  [94 %-97 %] 95 %  BP: (146-184)/(67-80) 169/74                           Neurosurgery Physical Exam  General: well developed, well nourished, no distress.   Head: normocephalic, atraumatic  Neurologic: Alert and  oriented. Thought content appropriate.  GCS: Motor: 6/Verbal: 5/Eyes: 4 GCS Total: 15  Mental Status: Awake, Alert, Oriented x 4  Language: No aphasia  Speech: No dysarthria  Cranial nerves: face symmetric, tongue midline, CN II-XII grossly intact.   Eyes: pupils equal, round, reactive to light with accomodation, EOMI.  Pulmonary: normal respirations, no signs of respiratory distress  Abdomen: soft, non-distended, not tender to palpation  Sensory: intact to light touch throughout, except decreased in the lower face and below the jaw line bilaterally.     Motor Strength: Moves all extremities spontaneously with good tone.  Full strength upper and lower extremities. No abnormal movements seen.     DTR's: 2 + and symmetric in UE and LE  Pronator Drift: no drift noted  Finger-to-nose: Intact bilaterally  Dc: absent  Clonus: absent  Babinski: absent  Pulses: 2+ and symmetric radial and dorsalis pedis.  Skin: Skin is warm, dry and intact.      Significant Labs:    Recent Labs  Lab 05/11/18  1025   *      K 4.2      CO2 24   BUN 25*   CREATININE 1.3   CALCIUM 9.3       Recent Labs  Lab 05/11/18  1025   WBC 8.38   HGB 11.3*   HCT 35.3*          Recent Labs  Lab 05/11/18  1025   INR 1.0     Microbiology Results (last 7 days)     Procedure Component Value Units Date/Time    Urine culture [[id number]] Collected:  05/11/18 0100    Order Status:  No result Specimen:  Urine Updated:  05/11/18 1232    Urine culture [[id number]]     Order Status:  Completed Specimen:  Urine         Significant Diagnostics:  CT head 05/11/2018 reviewed.  Unchanged partially calcified extra-axial lesion underlying the left parietal bone suggestive of meningioma compared to previous imaging.

## 2018-05-14 LAB — BACTERIA UR CULT: NORMAL

## 2018-05-15 ENCOUNTER — PATIENT OUTREACH (OUTPATIENT)
Dept: OTHER | Facility: OTHER | Age: 78
End: 2018-05-15

## 2018-05-15 NOTE — PROGRESS NOTES
Last 5 Patient Entered Readings                                      Current 30 Day Average: 157/70     Recent Readings 5/14/2018 5/9/2018 5/7/2018 5/4/2018 5/2/2018    SBP (mmHg) 154 160 159 144 151    DBP (mmHg) 70 72 73 66 79    Pulse 82 73 83 78 76        Patient's BP average is above goal of <130/80.     Ms. Quinones's BP remains elevated. She is currently on antibiotics for a UTI. She is not feeling well on our call. Will follow up in 2-3 weeks.     Patient denies s/s of hypertension (SOB, CP, severe headaches, changes in vision) associated with high readings. Instructed patient to go to the ED if BP > 180/110 and accompanied by hypertensive s/s, patient confirms understanding.    Will continue to monitor regularly. Will follow up in 2-3 weeks, sooner if BP begins to trend upward or downward.    Patient has my contact information and knows to call with any concerns or clinical changes.     Current HTN regimen:  Hypertension Medications             carvedilol (COREG) 25 MG tablet Take 2 tablets (50 mg total) by mouth 2 (two) times daily.    chlorthalidone (HYGROTEN) 25 MG Tab TAKE 1 TABLET (25 MG TOTAL) BY MOUTH ONCE DAILY.    hydrALAZINE (APRESOLINE) 100 MG tablet TAKE 1 TABLET (100 MG TOTAL) BY MOUTH 3 (THREE) TIMES DAILY.    NIFEdipine (ADALAT CC) 60 MG TbSR Take 1 tablet (60 mg total) by mouth once daily.    nitroGLYCERIN (NITROSTAT) 0.4 MG SL tablet Place 1 tablet under the tongue continuous prn.      valsartan (DIOVAN) 320 MG tablet Take 1 tablet (320 mg total) by mouth once daily.

## 2018-05-21 ENCOUNTER — OFFICE VISIT (OUTPATIENT)
Dept: INTERNAL MEDICINE | Facility: CLINIC | Age: 78
End: 2018-05-21
Payer: MEDICARE

## 2018-05-21 VITALS
SYSTOLIC BLOOD PRESSURE: 122 MMHG | WEIGHT: 156.06 LBS | OXYGEN SATURATION: 98 % | HEART RATE: 76 BPM | HEIGHT: 63 IN | DIASTOLIC BLOOD PRESSURE: 62 MMHG | BODY MASS INDEX: 27.65 KG/M2

## 2018-05-21 DIAGNOSIS — E04.1 THYROID NODULE: ICD-10-CM

## 2018-05-21 DIAGNOSIS — N18.30 TYPE 2 DIABETES MELLITUS WITH STAGE 3 CHRONIC KIDNEY DISEASE, WITH LONG-TERM CURRENT USE OF INSULIN: ICD-10-CM

## 2018-05-21 DIAGNOSIS — E11.22 TYPE 2 DIABETES MELLITUS WITH STAGE 3 CHRONIC KIDNEY DISEASE, WITH LONG-TERM CURRENT USE OF INSULIN: ICD-10-CM

## 2018-05-21 DIAGNOSIS — Z79.4 TYPE 2 DIABETES MELLITUS WITH STAGE 3 CHRONIC KIDNEY DISEASE, WITH LONG-TERM CURRENT USE OF INSULIN: ICD-10-CM

## 2018-05-21 DIAGNOSIS — I10 ESSENTIAL HYPERTENSION: ICD-10-CM

## 2018-05-21 DIAGNOSIS — Z09 HOSPITAL DISCHARGE FOLLOW-UP: Primary | ICD-10-CM

## 2018-05-21 DIAGNOSIS — R53.1 WEAKNESS: ICD-10-CM

## 2018-05-21 PROCEDURE — 3078F DIAST BP <80 MM HG: CPT | Mod: CPTII,S$GLB,, | Performed by: INTERNAL MEDICINE

## 2018-05-21 PROCEDURE — 99499 UNLISTED E&M SERVICE: CPT | Mod: S$GLB,,, | Performed by: INTERNAL MEDICINE

## 2018-05-21 PROCEDURE — 99214 OFFICE O/P EST MOD 30 MIN: CPT | Mod: S$GLB,,, | Performed by: INTERNAL MEDICINE

## 2018-05-21 PROCEDURE — 99999 PR PBB SHADOW E&M-EST. PATIENT-LVL III: CPT | Mod: PBBFAC,,, | Performed by: INTERNAL MEDICINE

## 2018-05-21 PROCEDURE — 3074F SYST BP LT 130 MM HG: CPT | Mod: CPTII,S$GLB,, | Performed by: INTERNAL MEDICINE

## 2018-05-21 NOTE — PROGRESS NOTES
Subjective:      Patient ID: Myesha Quinones is a 78 y.o. female.    Chief Complaint: Follow-up    HPI:  HPI   Patient is here for follow up and ER follow up:  ER visits:  5/11/2018: complaint weakness  4/8/2018: complaint weakness    The symptom presents itself during the night and when she gets up in the am and during the day she feels the weakness and the tiredness. She felt the episodes were related to either the nifedipine or premidone . She slowly stopped the premidone ( she stopped it the way she was told to start it). She stopped the nifedipine after the last ER visit and notes she feels better. She was also diagnosed with a uti.    Diabetes Management Status    Statin: Taking  ACE/ARB: Taking    Screening or Prevention Patient's value Goal Complete/Controlled?   HgA1C Testing and Control   Lab Results   Component Value Date    HGBA1C 6.7 (H) 04/08/2018      Annually/Less than 8% Yes   Lipid profile : 05/11/2018 Annually Yes   LDL control Lab Results   Component Value Date    LDLCALC 65.0 05/11/2018    Annually/Less than 100 mg/dl  Yes   Nephropathy screening Lab Results   Component Value Date    LABMICR 110.0 05/23/2017     Lab Results   Component Value Date    PROTEINUA 2+ (A) 05/11/2018    Annually Yes   Blood pressure BP Readings from Last 1 Encounters:   05/21/18 118/64    Less than 140/90 Yes   Dilated retinal exam : 02/22/2018 Annually Yes   Foot exam   : 10/12/2017 Annually Yes       Patient Active Problem List   Diagnosis    History of nonmelanoma skin cancer    Nonischemic cardiomyopathy    LBBB (left bundle branch block)    Chronic systolic heart failure    Nontoxic multinodular goiter    Meningioma    Asthma, chronic    Biventricular ICD (implantable cardioverter-defibrillator) in place    Tremor    Coronary artery disease involving native coronary artery without angina pectoris - Non-obstructive 50% LAD    Essential hypertension    Hyperlipidemia    History of breast cancer     Adrenal cortical nodule: repeat CT 6/2016    Calcification of aorta    Diabetic polyneuropathy associated with type 2 diabetes mellitus    Postinflammatory pulmonary fibrosis    Osteoporosis    KHOA (obstructive sleep apnea)    Type 2 diabetes mellitus with stage 3 chronic kidney disease, with long-term current use of insulin    Chronic kidney disease, stage 3, mod decreased GFR    Iron deficiency anemia    Chemotherapy-induced neuropathy    Hypomagnesemia    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, with long-term current use of insulin    Hypertensive retinopathy, bilateral    Multiple lung nodules    COPD (chronic obstructive pulmonary disease)    Mixed conductive and sensorineural hearing loss    COPD exacerbation    Cerebral meningioma    Numbness    Weakness     Past Medical History:   Diagnosis Date    Allergy     Asthma     Basal cell carcinoma     left forehead    Basal cell carcinoma     left nose    Basal cell carcinoma 05/20/2015    right nose    Basal cell carcinoma 12/22/2015    left lower post neck    Breast cancer     CAD (coronary artery disease)     Cardiomyopathy     Cardiomyopathy, ischemic     Cataract     CHF (congestive heart failure)     Chronic kidney disease, stage 3, mod decreased GFR 2/14/2017    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, with long-term current use of insulin 2/22/2018    COPD (chronic obstructive pulmonary disease)     COPD exacerbation 4/8/2018    Defibrillator discharge     Diabetes mellitus     Diabetes mellitus type II     Diabetes with neurologic complications     Goiter     MNG    HX: breast cancer     Hyperlipidemia     Hypertension     Iron deficiency anemia 5/16/2017    Left kidney mass     Meningioma     Osteoporosis, postmenopausal     Pacemaker     Skin cancer     s/p  excision    Sleep apnea     CPAP    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/2015    mid forehead    Unspecified vitamin D deficiency     Ventricular tachycardia     Vitamin B12 deficiency     Vitamin D deficiency disease      Past Surgical History:   Procedure Laterality Date    BASAL CELL CARCINOMA EXCISION      posterior neck and nose    BREAST BIOPSY      BREAST CYST EXCISION Left     BREAST SURGERY      CARDIAC DEFIBRILLATOR PLACEMENT      x 2    CATARACT EXTRACTION W/  INTRAOCULAR LENS IMPLANT Bilateral     CHOLECYSTECTOMY      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    FRACTURE SURGERY      left elbow and wrist as a child    HYSTERECTOMY      MASTECTOMY Right     SQUAMOUS CELL CARCINOMA EXCISION      remved from forehead    TONSILLECTOMY       Family History   Problem Relation Age of Onset    Diabetes Father     Heart disease Father     Diabetes Sister     Heart disease Sister     Diabetes Brother     Heart disease Brother     Hypertension Brother     Diabetes Brother     Heart disease Brother     Hypertension Brother     Diabetes Brother     Heart disease Brother     Cancer Brother         colon    Diabetes Brother     Cancer Son         skin    Diabetes Son         prediabetes    Diabetes Daughter         prediabetes    Cancer Daughter         melanoma    Obesity Daughter     Melanoma Daughter     Diabetes Son     Asthma Mother     Hypertension Mother     Stroke Mother     No Known Problems Maternal Grandmother     No Known Problems Maternal Grandfather     No Known Problems Paternal Grandmother     No Known Problems Paternal Grandfather     Amblyopia Neg Hx     Blindness Neg Hx     Cataracts Neg Hx     Glaucoma Neg Hx     Macular degeneration Neg Hx     Retinal detachment Neg Hx     Strabismus Neg Hx     Thyroid disease Neg Hx      Review of Systems   Constitutional: Negative for chills, fever and unexpected weight change.   HENT: Negative for trouble  "swallowing.    Respiratory: Negative for cough, shortness of breath and wheezing.    Cardiovascular: Negative for chest pain and palpitations.   Gastrointestinal: Negative for abdominal distention, abdominal pain, blood in stool and vomiting.   Musculoskeletal: Negative for back pain.     Objective:     Vitals:    05/21/18 0703   BP: 118/64   Pulse: 76   SpO2: 98%   Weight: 70.8 kg (156 lb 1.4 oz)   Height: 5' 3" (1.6 m)   PainSc: 0-No pain     Body mass index is 27.65 kg/m².  Physical Exam   Constitutional: She is oriented to person, place, and time. She appears well-developed and well-nourished. No distress.   Neck: Carotid bruit is not present. No thyromegaly present.   Cardiovascular: Normal rate and regular rhythm.  PMI is not displaced.    Murmur heard.  Pulmonary/Chest: Effort normal and breath sounds normal. No respiratory distress.   Abdominal: Soft. Bowel sounds are normal. She exhibits no distension. There is no tenderness.   Musculoskeletal: She exhibits no edema.   Neurological: She is alert and oriented to person, place, and time.     Assessment:     1. Hospital discharge follow-up    2. Weakness    3. Essential hypertension    4. Thyroid nodule    5. Type 2 diabetes mellitus with stage 3 chronic kidney disease, with long-term current use of insulin      Plan:   Myesha was seen today for follow-up.    Diagnoses and all orders for this visit:    Hospital discharge follow-up: doing better    Weakness improved after stopping the nifedipine    Essential hypertension: will continue to monitor    Thyroid nodule: follow up in Endo scheduled    Type 2 diabetes mellitus with stage 3 chronic kidney disease, with long-term current use of insulin: following      Follow-up in about 2 weeks (around 6/4/2018).     Medication List          Accurate as of 5/21/18  7:33 AM. If you have any questions, ask your nurse or doctor.               CHANGE how you take these medications    blood sugar diagnostic " Strp  Commonly known as:  ACCU-CHEK HECTOR  Uses Accu-Check Hector meter to test BG 4x/day  What changed:  additional instructions     NIFEdipine 60 MG Tbsr  Commonly known as:  ADALAT CC  Take 1 tablet (60 mg total) by mouth once daily.  What changed:  additional instructions     primidone 50 MG Tab  Commonly known as:  MYSOLINE  Take as directed.  What changed:  · how much to take  · how to take this  · when to take this  · additional instructions     valsartan 320 MG tablet  Commonly known as:  DIOVAN  Take 1 tablet (320 mg total) by mouth once daily.  What changed:  additional instructions        CONTINUE taking these medications    ACCU-CHEK HECTOR CONTROL SOLN Soln  Generic drug:  blood glucose control high,low     ACCU-CHEK HECTOR PLUS METER Misc  Generic drug:  blood-glucose meter     albuterol 90 mcg/actuation inhaler  Inhale 2 puffs into the lungs every 6 (six) hours as needed for Wheezing. Rescue     albuterol-ipratropium 2.5 mg-0.5 mg/3 mL nebulizer solution  Commonly known as:  DUO-NEB  Take 3 mLs by nebulization every 4 (four) hours. Rescue     azelastine 137 mcg (0.1 %) nasal spray  Commonly known as:  ASTELIN  1 spray (137 mcg total) by Nasal route 2 (two) times daily. For severe allergy     B-12 DOTS ORAL     carvedilol 25 MG tablet  Commonly known as:  COREG  Take 2 tablets (50 mg total) by mouth 2 (two) times daily.     cetirizine 5 MG tablet  Commonly known as:  ZYRTEC  Take 1 tablet (5 mg total) by mouth once daily.     chlorthalidone 25 MG Tab  Commonly known as:  HYGROTEN  TAKE 1 TABLET (25 MG TOTAL) BY MOUTH ONCE DAILY.     ENTERIC COATED ASPIRIN 81 MG EC tablet  Generic drug:  aspirin     FISH  mg Cap  Generic drug:  omega-3 fatty acids     fluticasone-salmeterol 250-50 mcg/dose 250-50 mcg/dose diskus inhaler  Commonly known as:  ADVAIR  Inhale 1 puff into the lungs 2 (two) times daily. This is a change from one month     glimepiride 2 MG tablet  Commonly known as:  AMARYL  TAKE 1 TABLET  "BY MOUTH daily     hydrALAZINE 100 MG tablet  Commonly known as:  APRESOLINE  TAKE 1 TABLET (100 MG TOTAL) BY MOUTH 3 (THREE) TIMES DAILY.     insulin glargine 100 unit/mL (3 mL) Inpn pen  Commonly known as:  LANTUS SOLOSTAR U-100 INSULIN  Inject 30 Units into the skin every evening.     * lancets Misc  Commonly known as:  ACCU-CHEK SOFTCLIX LANCETS  Uses Accu-Chek Angelica meter to test BG 4x/day     * lancets 30 gauge Misc     lancing device Misc     linagliptin 5 mg Tab tablet  Commonly known as:  TRADJENTA  Take 1 tablet (5 mg total) by mouth once daily.     magnesium oxide 400 mg tablet  Commonly known as:  MAG-OX  Take 1 tablet (400 mg total) by mouth once daily.     metFORMIN 500 MG tablet  Commonly known as:  GLUCOPHAGE  TAKE 2 TABLETS (1,000 MG TOTAL) BY MOUTH 2 (TWO) TIMES DAILY WITH MEALS.     nitroGLYCERIN 0.4 MG SL tablet  Commonly known as:  NITROSTAT     pen needle, diabetic 31 gauge x 3/16" Ndle  Commonly known as:  BD ULTRA-FINE MINI PEN NEEDLE  USE WITH LANTUS AT BEDTIME     sertraline 25 MG tablet  Commonly known as:  ZOLOFT  Take 1 tablet (25 mg total) by mouth every evening.     simvastatin 20 MG tablet  Commonly known as:  ZOCOR  TAKE 1 TABLET (20 MG TOTAL) BY MOUTH EVERY EVENING.     VITAMIN D3 2,000 unit Tab  Generic drug:  cholecalciferol (vitamin D3)        * This list has 2 medication(s) that are the same as other medications prescribed for you. Read the directions carefully, and ask your doctor or other care provider to review them with you.                "

## 2018-05-29 ENCOUNTER — OFFICE VISIT (OUTPATIENT)
Dept: PODIATRY | Facility: CLINIC | Age: 78
End: 2018-05-29
Payer: MEDICARE

## 2018-05-29 VITALS
HEIGHT: 63 IN | BODY MASS INDEX: 27.64 KG/M2 | HEART RATE: 76 BPM | DIASTOLIC BLOOD PRESSURE: 66 MMHG | SYSTOLIC BLOOD PRESSURE: 122 MMHG | WEIGHT: 156 LBS

## 2018-05-29 DIAGNOSIS — T45.1X5A CHEMOTHERAPY-INDUCED NEUROPATHY: ICD-10-CM

## 2018-05-29 DIAGNOSIS — B35.1 DERMATOPHYTOSIS OF NAIL: ICD-10-CM

## 2018-05-29 DIAGNOSIS — G62.0 CHEMOTHERAPY-INDUCED NEUROPATHY: ICD-10-CM

## 2018-05-29 DIAGNOSIS — E11.49 TYPE II DIABETES MELLITUS WITH NEUROLOGICAL MANIFESTATIONS: Primary | ICD-10-CM

## 2018-05-29 PROCEDURE — 3078F DIAST BP <80 MM HG: CPT | Mod: CPTII,S$GLB,, | Performed by: PODIATRIST

## 2018-05-29 PROCEDURE — 11721 DEBRIDE NAIL 6 OR MORE: CPT | Mod: Q9,S$GLB,, | Performed by: PODIATRIST

## 2018-05-29 PROCEDURE — 99999 PR PBB SHADOW E&M-EST. PATIENT-LVL III: CPT | Mod: PBBFAC,,, | Performed by: PODIATRIST

## 2018-05-29 PROCEDURE — 99213 OFFICE O/P EST LOW 20 MIN: CPT | Mod: 25,S$GLB,, | Performed by: PODIATRIST

## 2018-05-29 PROCEDURE — 3074F SYST BP LT 130 MM HG: CPT | Mod: CPTII,S$GLB,, | Performed by: PODIATRIST

## 2018-05-29 PROCEDURE — 99499 UNLISTED E&M SERVICE: CPT | Mod: S$GLB,,, | Performed by: PODIATRIST

## 2018-05-29 NOTE — PATIENT INSTRUCTIONS
How to Check Your Feet    Below are tips to help you look for foot problems. Try to check your feet at the same time each day, such as when you get out of bed in the morning.    · Check the top of each foot. The tops of toes, back of the heel, and outer edge of the foot can get a lot of rubbing from poor-fitting shoes.    · Check the bottom of each foot. Daily wear and tear often leads to problems at pressure spots.    · Check the toes and nails. Fungal infections often occur between toes. Toenail problems can also be a sign of fungal infections or lead to breaks in the skin.    · Check your shoes, too. Loose objects inside a shoe can injure the foot. Use your hand to feel inside your shoes for things like cecily, loose stitching, or rough areas that could irritate your skin.        Diabetic Foot Care    Diabetes can lead to a number of different foot complications. Fortunately, most of these complications can be prevented with a little extra foot care. If diabetes is not well controlled, the high blood sugar can cause damage to blood vessels and result in poor circulation to the foot. When the skin does not get enough blood flow, it becomes prone to pressure sores and ulcers, which heal slowly.  High blood sugar can also damage nerves, interfering with the ability to feel pain and pressure. When you cant feel your foot normally, it is easy to injure your skin, bones and joints without knowing it. For these reasons diabetes increases the risk of fungal infections, bunions and ulcers. Deep ulcers can lead to bone infection. Gangrene is the most serious foot complication of diabetes. It usually occurs on the tips of the toes as blacked areas of skin. The black area is dead tissue. In severe cases, gangrene spreads to involve the entire toe, other toes and the entire foot. Foot or toe amputation may be required. Good foot care and blood sugar control can prevent this.    Home Care  1. Wear comfortable, proper fitting  shoes.  2. Wash your feet daily with warm water and mild soap.  3. After drying, apply a moisturizing cream or lotion.  4. Check your feet daily for skin breaks, blisters, swelling, or redness. Look between your toes also.  5. Wear cotton socks and change them every day.  6. Trim toe nails carefully and do not cut your cuticles.  7. Strive to keep your blood sugar under control with a combination of medicines, diet and activity.  8. If you smoke and have diabetes, it is very important that you stop. Smoking reduces blood flow to your foot.  9. Avoid activities that increase your risk of foot injury:  · Do not walk barefoot.  · Do not use heating pads or hot water bottles on your feet.  · Do not put your foot in a hot tub without first checking the temperature with your hand.  10) Schedule yearly foot exams.    Follow Up  with your doctor or as advised by our staff. Report any cut, puncture, scrape, other injury, blister, ingrown toenail or ulcer on your foot.    Get Prompt Medical Attention  if any of the following occur:  -- Open ulcer with pus draining from the wound  -- Increasing foot or leg pain  -- New areas of redness or swelling or tender areas of the foot    © 2846-8686 [employer]. [address] [place] [address], [place] [address]. All rights reserved. This information is not intended as a substitute for professional medical care. Always follow your healthcare professional's instructions.        Your A1c:    Hemoglobin A1C   Date Value Ref Range Status   04/08/2018 6.7 (H) 4.0 - 5.6 % Final     Comment:     According to ADA guidelines, hemoglobin A1c <7.0% represents  optimal control in non-pregnant diabetic patients. Different  metrics may apply to specific patient populations.   Standards of Medical Care in Diabetes-2016.  For the purpose of screening for the presence of diabetes:  <5.7%     Consistent with the absence of diabetes  5.7-6.4%  Consistent with increasing risk for diabetes    (prediabetes)  >or=6.5%  Consistent with diabetes  Currently, no consensus exists for use of hemoglobin A1c  for diagnosis of diabetes for children.  This Hemoglobin A1c assay has significant interference with fetal   hemoglobin   (HbF). The results are invalid for patients with abnormal amounts of   HbF,   including those with known Hereditary Persistence   of Fetal Hemoglobin. Heterozygous hemoglobin variants (HbAS, HbAC,   HbAD, HbAE, HbA2) do not significantly interfere with this assay;   however, presence of multiple variants in a sample may impact the %   interference.     02/08/2018 6.6 (H) 4.0 - 5.6 % Final     Comment:     According to ADA guidelines, hemoglobin A1c <7.0% represents  optimal control in non-pregnant diabetic patients. Different  metrics may apply to specific patient populations.   Standards of Medical Care in Diabetes-2016.  For the purpose of screening for the presence of diabetes:  <5.7%     Consistent with the absence of diabetes  5.7-6.4%  Consistent with increasing risk for diabetes   (prediabetes)  >or=6.5%  Consistent with diabetes  Currently, no consensus exists for use of hemoglobin A1c  for diagnosis of diabetes for children.  This Hemoglobin A1c assay has significant interference with fetal   hemoglobin   (HbF). The results are invalid for patients with abnormal amounts of   HbF,   including those with known Hereditary Persistence   of Fetal Hemoglobin. Heterozygous hemoglobin variants (HbAS, HbAC,   HbAD, HbAE, HbA2) do not significantly interfere with this assay;   however, presence of multiple variants in a sample may impact the %   interference.     11/16/2017 7.2 (H) 4.0 - 5.6 % Final     Comment:     According to ADA guidelines, hemoglobin A1c <7.0% represents  optimal control in non-pregnant diabetic patients. Different  metrics may apply to specific patient populations.   Standards of Medical Care in Diabetes-2016.  For the purpose of screening for the presence of  diabetes:  <5.7%     Consistent with the absence of diabetes  5.7-6.4%  Consistent with increasing risk for diabetes   (prediabetes)  >or=6.5%  Consistent with diabetes  Currently, no consensus exists for use of hemoglobin A1c  for diagnosis of diabetes for children.  This Hemoglobin A1c assay has significant interference with fetal   hemoglobin   (HbF). The results are invalid for patients with abnormal amounts of   HbF,   including those with known Hereditary Persistence   of Fetal Hemoglobin. Heterozygous hemoglobin variants (HbAS, HbAC,   HbAD, HbAE, HbA2) do not significantly interfere with this assay;   however, presence of multiple variants in a sample may impact the %   interference.

## 2018-05-29 NOTE — PROGRESS NOTES
Chief Complaint   Patient presents with    Diabetic Foot Exam     dr jessica gaston 5/21/18             HPI:   The patient is a 78 y.o.  female  who presents for a diabetic foot exam.     Patient reports presence of abnormal sensation to the feet .  +numbness at night.  No pain otherwise.  Not interested in medications for the nighttime numbness at this time.   History of diabetic foot ulcers: none   History of foot surgery: none.     Shoes worn today:  boone  Has difficulty trimming the thick left 5th toe.    Otherwise no new issues.         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, with long-term current use of insulin 2/22/2018    COPD (chronic obstructive pulmonary disease)     COPD exacerbation 4/8/2018    Defibrillator discharge     Diabetes mellitus     Diabetes mellitus type II     Diabetes with neurologic complications     Goiter     MNG    HX: breast cancer     Hyperlipidemia     Hypertension     Iron deficiency anemia 5/16/2017    Left kidney mass     Meningioma     Osteoporosis, postmenopausal     Pacemaker     Skin cancer     s/p excision    Sleep apnea     CPAP    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/2015    mid forehead    Unspecified vitamin D deficiency     Ventricular tachycardia     Vitamin B12 deficiency     Vitamin D deficiency disease           Current Outpatient Prescriptions on File Prior to Visit   Medication Sig Dispense Refill    ACCU-CHEK HECTOR CONTROL SOLN Soln       ACCU-CHEK HECTOR PLUS METER [facility]       albuterol 90 mcg/actuation inhaler Inhale 2 puffs into the lungs every 6 (six) hours as needed for Wheezing. Rescue 1 Inhaler 12    albuterol-ipratropium 2.5mg-0.5mg/3mL (DUO-NEB) 0.5 mg-3 mg(2.5 mg base)/3 mL nebulizer solution Take 3 mLs by nebulization every 4 (four) hours. Rescue 1 Box 3    aspirin (ENTERIC COATED ASPIRIN) 81 MG EC tablet Take 1 tablet by mouth once daily.       blood sugar diagnostic (ACCU-CHEK HECTOR) Strp Uses Accu-Check Hector meter to test BG 4x/day (Patient taking differently: Uses Accu-Check Hector meter to test BG 4x/day checking 2x day) 400 strip 3    carvedilol (COREG) 25 MG tablet Take 2 tablets (50 mg total) by mouth 2 (two) times daily. 360 tablet 3    cetirizine (ZYRTEC) 5 MG tablet Take 1 tablet (5 mg total) by mouth once daily.  0    chlorthalidone (HYGROTEN) 25 MG Tab TAKE 1 TABLET (25 MG TOTAL) BY MOUTH ONCE DAILY. 90 tablet 0    cholecalciferol, vitamin D3, (VITAMIN D3) 2,000 unit Tab Take by mouth once daily.      CYANOCOBALAMIN, VITAMIN B-12, (B-12 DOTS ORAL) Take 1 tablet by mouth once daily.      fluticasone-salmeterol 250-50 mcg/dose (ADVAIR) 250-50 mcg/dose diskus inhaler Inhale 1 puff into the lungs 2 (two) times daily. This is a change from one month 180 each 3    glimepiride (AMARYL) 2 MG tablet TAKE 1 TABLET BY MOUTH daily 180 tablet 2    hydrALAZINE (APRESOLINE) 100 MG tablet TAKE 1 TABLET (100 MG TOTAL) BY MOUTH 3 (THREE) TIMES DAILY. 90 tablet 6    insulin glargine (LANTUS SOLOSTAR) 100 unit/mL (3 mL) InPn pen Inject 30 Units into the skin every evening. 3 Box 3    lancets (ACCU-CHEK SOFTCLIX LANCETS) Misc Uses Accu-Chek Hector meter to test BG 4x/day 400 each 3    lancets 30 gauge Misc       lancing device Misc       linagliptin (TRADJENTA) 5 mg Tab tablet Take 1 tablet (5  "mg total) by mouth once daily. 90 tablet 3    magnesium oxide (MAG-OX) 400 mg tablet Take 1 tablet (400 mg total) by mouth once daily.  0    metFORMIN (GLUCOPHAGE) 500 MG tablet TAKE 2 TABLETS (1,000 MG TOTAL) BY MOUTH 2 (TWO) TIMES DAILY WITH MEALS. 360 tablet 3    NIFEdipine (ADALAT CC) 60 MG TbSR Take 1 tablet (60 mg total) by mouth once daily. (Patient taking differently: Take 60 mg by mouth once daily. Patient stopped the nifedipine) 30 tablet 6    nitroGLYCERIN (NITROSTAT) 0.4 MG SL tablet Place 1 tablet under the tongue continuous prn.        omega-3 fatty acids (FISH OIL) 500 mg Cap Take 1 capsule by mouth Twice daily.      pen needle, diabetic (BD INSULIN PEN NEEDLE UF MINI) 31 gauge x 3/16" Ndle USE WITH LANTUS AT BEDTIME 100 each 3    primidone (MYSOLINE) 50 MG Tab Take as directed. (Patient taking differently: Take 50 mg by mouth 2 (two) times daily. Patient currently stopped) 60 tablet 11    sertraline (ZOLOFT) 25 MG tablet Take 1 tablet (25 mg total) by mouth every evening. 30 tablet 6    simvastatin (ZOCOR) 20 MG tablet TAKE 1 TABLET (20 MG TOTAL) BY MOUTH EVERY EVENING. 90 tablet 3    valsartan (DIOVAN) 320 MG tablet Take 1 tablet (320 mg total) by mouth once daily. (Patient taking differently: Take 320 mg by mouth once daily. Change to night) 90 tablet 3    azelastine (ASTELIN) 137 mcg (0.1 %) nasal spray 1 spray (137 mcg total) by Nasal route 2 (two) times daily. For severe allergy 30 mL 1     No current facility-administered medications on file prior to visit.          Review of patient's allergies indicates:   Allergen Reactions    Iodine and iodide containing products Other (See Comments)             ROS:  General ROS: negative  Respiratory ROS: no cough, shortness of breath, or wheezing  Cardiovascular ROS: no chest pain or dyspnea on exertion  Musculoskeletal ROS: negative  Neurological ROS:   positive for - numbness/tingling  Dermatological ROS: negative      LAST HbA1c: "   Hemoglobin A1C   Date Value Ref Range Status   04/08/2018 6.7 (H) 4.0 - 5.6 % Final     Comment:     According to ADA guidelines, hemoglobin A1c <7.0% represents  optimal control in non-pregnant diabetic patients. Different  metrics may apply to specific patient populations.   Standards of Medical Care in Diabetes-2016.  For the purpose of screening for the presence of diabetes:  <5.7%     Consistent with the absence of diabetes  5.7-6.4%  Consistent with increasing risk for diabetes   (prediabetes)  >or=6.5%  Consistent with diabetes  Currently, no consensus exists for use of hemoglobin A1c  for diagnosis of diabetes for children.  This Hemoglobin A1c assay has significant interference with fetal   hemoglobin   (HbF). The results are invalid for patients with abnormal amounts of   HbF,   including those with known Hereditary Persistence   of Fetal Hemoglobin. Heterozygous hemoglobin variants (HbAS, HbAC,   HbAD, HbAE, HbA2) do not significantly interfere with this assay;   however, presence of multiple variants in a sample may impact the %   interference.     02/08/2018 6.6 (H) 4.0 - 5.6 % Final     Comment:     According to ADA guidelines, hemoglobin A1c <7.0% represents  optimal control in non-pregnant diabetic patients. Different  metrics may apply to specific patient populations.   Standards of Medical Care in Diabetes-2016.  For the purpose of screening for the presence of diabetes:  <5.7%     Consistent with the absence of diabetes  5.7-6.4%  Consistent with increasing risk for diabetes   (prediabetes)  >or=6.5%  Consistent with diabetes  Currently, no consensus exists for use of hemoglobin A1c  for diagnosis of diabetes for children.  This Hemoglobin A1c assay has significant interference with fetal   hemoglobin   (HbF). The results are invalid for patients with abnormal amounts of   HbF,   including those with known Hereditary Persistence   of Fetal Hemoglobin. Heterozygous hemoglobin variants (HbAS,  "HbAC,   HbAD, HbAE, HbA2) do not significantly interfere with this assay;   however, presence of multiple variants in a sample may impact the %   interference.     11/16/2017 7.2 (H) 4.0 - 5.6 % Final     Comment:     According to ADA guidelines, hemoglobin A1c <7.0% represents  optimal control in non-pregnant diabetic patients. Different  metrics may apply to specific patient populations.   Standards of Medical Care in Diabetes-2016.  For the purpose of screening for the presence of diabetes:  <5.7%     Consistent with the absence of diabetes  5.7-6.4%  Consistent with increasing risk for diabetes   (prediabetes)  >or=6.5%  Consistent with diabetes  Currently, no consensus exists for use of hemoglobin A1c  for diagnosis of diabetes for children.  This Hemoglobin A1c assay has significant interference with fetal   hemoglobin   (HbF). The results are invalid for patients with abnormal amounts of   HbF,   including those with known Hereditary Persistence   of Fetal Hemoglobin. Heterozygous hemoglobin variants (HbAS, HbAC,   HbAD, HbAE, HbA2) do not significantly interfere with this assay;   however, presence of multiple variants in a sample may impact the %   interference.             EXAM:   Vitals:    05/29/18 0854   BP: 122/66   Pulse: 76   Weight: 70.8 kg (156 lb)   Height: 5' 3" (1.6 m)       General: alert, no distress, cooperative    Vascular:   Dorsalis pedis:   2+ bilateral.   Posterior Tibial:   2+ bilateral.   3 seconds capillary refill time   Temperature of toes are warm to touch.   normal hair growth on the feet.    Edema on feet:   Trace and non-pitting   Varicosities:  none    Dermatological:    Skin: thin,  Warm and dry. no hyperpigmentation, vitiligo, or suspicious lesions  Nails: toenails 1-5 L and 1-5 R  are elongated by 1-3mm.  No paronychia.   Callus:  None  Open Wounds: None  Ecchymoses is not observed.      Erythema:  none .     Interdigital spaces: clean, dry and without evidence of break in " skin integrity      Neurological:    normal light touch sensation and normal position sensation  [place] diminished      Musculoskeletal:     Muscle strength: 5/5, adequate ROM, adequate strength     Right foot:  Splay 2nd and 3rd toes, 3-5 hammertoes  Left foot: splay 2nd and 3rd toes, 3-5 hammertoes             ASSESSMENT/PLAN:      I counseled the patient on her conditions, their implications and medical management.       Type II diabetes mellitus with neurological manifestations    Chemotherapy-induced neuropathy    Dermatophytosis of nail    Other orders  -     Foot Care      Shoe inspection. Diabetic Foot Education. Patient reminded of the importance of good nutrition and blood sugar control to help prevent podiatric complications of diabetes. Patient instructed on proper foot hygiene. We discussed wearing proper shoe gear, daily foot inspections, never walking without protective shoe gear, never putting sharp instruments to feet.    Routine Foot Care  Date/Time: 5/29/2018 9:25 AM  Performed by: HADLEY MOROCHO  Authorized by: HADLEY MOROCHO     Consent Done?:  Yes (Verbal)    Nail Care Type:  Debride  Location(s): All  (Left 1st Toe, Left 3rd Toe, Left 2nd Toe, Left 4th Toe, Left 5th Toe, Right 1st Toe, Right 2nd Toe, Right 3rd Toe, Right 4th Toe and Right 5th Toe)  Patient tolerance:  Patient tolerated the procedure well with no immediate complications     With patient's permission, the toenails mentioned above were aggressively reduced and debrided using a nail nipper, removing all offending nail and debris. The patient will continue to monitor the areas daily, inspect the feet, wear protective shoe gear when ambulatory, and moisturizer to maintain skin integrity.           Follow-up in about 1 year (around 5/29/2019) for diabetic foot exam, or sooner if concerned.

## 2018-05-30 NOTE — PROGRESS NOTES
Last 5 Patient Entered Readings                                      Current 30 Day Average: 151/70     Recent Readings 5/29/2018 5/28/2018 5/24/2018 5/23/2018 5/21/2018    SBP (mmHg) 161 159 149 115 149    DBP (mmHg) 76 74 68 58 69    Pulse 70 77 83 83 76          Digital Medicine: Health  Follow Up    Lifestyle Modifications:    1.Dietary Modifications (Sodium intake <2,000mg/day, food labels, dining out): Reinforced importance of maintaining a low sodium diet; verbalizes understanding. States she continues to read food labels and not add salt and will continue this  Moving forward.      2.Physical Activity: Deferred.     3.Medication Therapy: Patient has been compliant with the medication regimen.    4.Patient has the following medication side effects/concerns: Reports feeling much better within last couple of weeks. No longer experiencing and numbness, dizziness, weakness. States she is hesitant to try new medications as a result of her most recent ED visit and medication side effects.    Follow up with Mrs. Myesha Quinones completed. No further questions or concerns. Will continue follow up to achieve health goals. Reinforced importance of regular movement and low sodium diet. Patient encouraged to call with any questions or concerns before next follow up.

## 2018-05-31 ENCOUNTER — PROCEDURE VISIT (OUTPATIENT)
Dept: OPHTHALMOLOGY | Facility: CLINIC | Age: 78
End: 2018-05-31
Payer: MEDICARE

## 2018-05-31 VITALS — SYSTOLIC BLOOD PRESSURE: 147 MMHG | HEART RATE: 82 BPM | DIASTOLIC BLOOD PRESSURE: 74 MMHG

## 2018-05-31 DIAGNOSIS — E11.3213 CONTROLLED TYPE 2 DIABETES MELLITUS WITH BOTH EYES AFFECTED BY MILD NONPROLIFERATIVE RETINOPATHY AND MACULAR EDEMA, WITH LONG-TERM CURRENT USE OF INSULIN: Primary | ICD-10-CM

## 2018-05-31 DIAGNOSIS — Z79.4 CONTROLLED TYPE 2 DIABETES MELLITUS WITH BOTH EYES AFFECTED BY MILD NONPROLIFERATIVE RETINOPATHY AND MACULAR EDEMA, WITH LONG-TERM CURRENT USE OF INSULIN: Primary | ICD-10-CM

## 2018-05-31 DIAGNOSIS — H35.033 HYPERTENSIVE RETINOPATHY, BILATERAL: ICD-10-CM

## 2018-05-31 PROCEDURE — 99499 UNLISTED E&M SERVICE: CPT | Mod: S$GLB,,, | Performed by: OPHTHALMOLOGY

## 2018-05-31 PROCEDURE — 92134 CPTRZ OPH DX IMG PST SGM RTA: CPT | Mod: S$GLB,,, | Performed by: OPHTHALMOLOGY

## 2018-05-31 PROCEDURE — 67028 INJECTION EYE DRUG: CPT | Mod: LT,S$GLB,, | Performed by: OPHTHALMOLOGY

## 2018-05-31 RX ADMIN — Medication 1.25 MG: at 02:05

## 2018-05-31 NOTE — PROGRESS NOTES
"HPI     1 mo / OCT / Avastin   DLS- 04/26/2018 Dr. Arechiga     Pt sts no change in va since last visit denies pain   (-)Flashes (-)Floaters  (-)Photophobia  (-)Glare        OCT - OD No ME  OS - central - some worsening    Prior FA - Foveal MA's OS   No NV of NP OU      A/P    1. 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 DME OS  S/p Avastin OS x 3  Foveal MA"s OS - may need chronic pharmacotherapy    Avastin OS    Get approval ozurdex     3. HTN Ret OU  BS/BP/chol control    4. PCIOL OU  With small PCO      1 month OCT    Risks, benefits, and alternatives to treatment discussed in detail with the patient.  The patient voiced understanding and wished to proceed with the procedure    Injection Procedure Note:  Diagnosis: DME OS    Topical Proparacaine and Betadine. Conj Prep only  Inject Avastin OS at 6:00 @ 3.5-4mm posterior to limbus  Post Operative Dx: Same  Complications: None  Follow up as above.    "

## 2018-05-31 NOTE — PATIENT INSTRUCTIONS

## 2018-06-06 ENCOUNTER — OFFICE VISIT (OUTPATIENT)
Dept: INTERNAL MEDICINE | Facility: CLINIC | Age: 78
End: 2018-06-06
Payer: MEDICARE

## 2018-06-06 ENCOUNTER — TELEPHONE (OUTPATIENT)
Dept: INTERNAL MEDICINE | Facility: CLINIC | Age: 78
End: 2018-06-06

## 2018-06-06 VITALS
WEIGHT: 156.06 LBS | DIASTOLIC BLOOD PRESSURE: 64 MMHG | BODY MASS INDEX: 27.65 KG/M2 | OXYGEN SATURATION: 96 % | HEART RATE: 82 BPM | HEIGHT: 63 IN | SYSTOLIC BLOOD PRESSURE: 138 MMHG

## 2018-06-06 DIAGNOSIS — R53.1 WEAKNESS: ICD-10-CM

## 2018-06-06 DIAGNOSIS — R39.9 LOWER URINARY TRACT SYMPTOMS: Primary | ICD-10-CM

## 2018-06-06 LAB
BACTERIA #/AREA URNS AUTO: NORMAL /HPF
BILIRUB UR QL STRIP: NEGATIVE
CLARITY UR REFRACT.AUTO: CLEAR
COLOR UR AUTO: YELLOW
GLUCOSE UR QL STRIP: NEGATIVE
HGB UR QL STRIP: NEGATIVE
HYALINE CASTS UR QL AUTO: 0 /LPF
KETONES UR QL STRIP: NEGATIVE
LEUKOCYTE ESTERASE UR QL STRIP: ABNORMAL
MICROSCOPIC COMMENT: NORMAL
NITRITE UR QL STRIP: NEGATIVE
PH UR STRIP: 5 [PH] (ref 5–8)
PROT UR QL STRIP: ABNORMAL
RBC #/AREA URNS AUTO: 1 /HPF (ref 0–4)
SP GR UR STRIP: 1.01 (ref 1–1.03)
SQUAMOUS #/AREA URNS AUTO: 4 /HPF
URN SPEC COLLECT METH UR: ABNORMAL
UROBILINOGEN UR STRIP-ACNC: NEGATIVE EU/DL
WBC #/AREA URNS AUTO: 5 /HPF (ref 0–5)

## 2018-06-06 PROCEDURE — 87086 URINE CULTURE/COLONY COUNT: CPT

## 2018-06-06 PROCEDURE — 81001 URINALYSIS AUTO W/SCOPE: CPT

## 2018-06-06 PROCEDURE — 3075F SYST BP GE 130 - 139MM HG: CPT | Mod: CPTII,S$GLB,, | Performed by: INTERNAL MEDICINE

## 2018-06-06 PROCEDURE — 99213 OFFICE O/P EST LOW 20 MIN: CPT | Mod: S$GLB,,, | Performed by: INTERNAL MEDICINE

## 2018-06-06 PROCEDURE — 99999 PR PBB SHADOW E&M-EST. PATIENT-LVL III: CPT | Mod: PBBFAC,,, | Performed by: INTERNAL MEDICINE

## 2018-06-06 PROCEDURE — 3078F DIAST BP <80 MM HG: CPT | Mod: CPTII,S$GLB,, | Performed by: INTERNAL MEDICINE

## 2018-06-06 NOTE — PROGRESS NOTES
Subjective:      Patient ID: Myesha Quinones is a 78 y.o. female.    Chief Complaint: Follow-up    HPI:  HPI   Patient states that she is better since stopping the nifedipine. She has no weak spells. Her breathing problems are improved    She has also stopped the primidone and sertraline.  Patient Active Problem List   Diagnosis    History of nonmelanoma skin cancer    Nonischemic cardiomyopathy    LBBB (left bundle branch block)    Chronic systolic heart failure    Nontoxic multinodular goiter    Meningioma    Asthma, chronic    Biventricular ICD (implantable cardioverter-defibrillator) in place    Tremor    Coronary artery disease involving native coronary artery without angina pectoris - Non-obstructive 50% LAD    Essential hypertension    Hyperlipidemia    History of breast cancer    Adrenal cortical nodule: repeat CT 6/2016    Calcification of aorta    Diabetic polyneuropathy associated with type 2 diabetes mellitus    Postinflammatory pulmonary fibrosis    Osteoporosis    KHOA (obstructive sleep apnea)    Type 2 diabetes mellitus with stage 3 chronic kidney disease, with long-term current use of insulin    Chronic kidney disease, stage 3, mod decreased GFR    Iron deficiency anemia    Chemotherapy-induced neuropathy    Hypomagnesemia    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, with long-term current use of insulin    Hypertensive retinopathy, bilateral    Multiple lung nodules    COPD (chronic obstructive pulmonary disease)    Mixed conductive and sensorineural hearing loss    COPD exacerbation    Cerebral meningioma    Numbness    Weakness     Past Medical History:   Diagnosis Date    Allergy     Asthma     Basal cell carcinoma     left forehead    Basal cell carcinoma     left nose    Basal cell carcinoma 05/20/2015    right nose    Basal cell carcinoma 12/22/2015    left lower post neck     Breast cancer     CAD (coronary artery disease)     Cardiomyopathy     Cardiomyopathy, ischemic     Cataract     CHF (congestive heart failure)     Chronic kidney disease, stage 3, mod decreased GFR 2/14/2017    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, with long-term current use of insulin 2/22/2018    COPD (chronic obstructive pulmonary disease)     COPD exacerbation 4/8/2018    Defibrillator discharge     Diabetes mellitus     Diabetes mellitus type II     Diabetes with neurologic complications     Goiter     MNG    HX: breast cancer     Hyperlipidemia     Hypertension     Iron deficiency anemia 5/16/2017    Left kidney mass     Meningioma     Osteoporosis, postmenopausal     Pacemaker     Skin cancer     s/p excision    Sleep apnea     CPAP    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/2015    mid forehead    Unspecified vitamin D deficiency     Ventricular tachycardia     Vitamin B12 deficiency     Vitamin D deficiency disease      Past Surgical History:   Procedure Laterality Date    BASAL CELL CARCINOMA EXCISION      posterior neck and nose    BREAST BIOPSY      BREAST CYST EXCISION Left     BREAST SURGERY      CARDIAC DEFIBRILLATOR PLACEMENT      x 2    CATARACT EXTRACTION W/  INTRAOCULAR LENS IMPLANT Bilateral     CHOLECYSTECTOMY      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    FRACTURE SURGERY      left elbow and wrist as a child    HYSTERECTOMY      MASTECTOMY Right     SQUAMOUS CELL CARCINOMA EXCISION      remved from forehead    TONSILLECTOMY       Family History   Problem Relation Age of Onset    Diabetes Father     Heart disease Father     Diabetes Sister     Heart disease Sister     Diabetes Brother     Heart disease Brother     Hypertension Brother     Diabetes Brother     Heart disease Brother     Hypertension Brother     Diabetes Brother     Heart disease Brother     Cancer  "Brother         colon    Diabetes Brother     Cancer Son         skin    Diabetes Son         prediabetes    Diabetes Daughter         prediabetes    Cancer Daughter         melanoma    Obesity Daughter     Melanoma Daughter     Diabetes Son     Asthma Mother     Hypertension Mother     Stroke Mother     No Known Problems Maternal Grandmother     No Known Problems Maternal Grandfather     No Known Problems Paternal Grandmother     No Known Problems Paternal Grandfather     Amblyopia Neg Hx     Blindness Neg Hx     Cataracts Neg Hx     Glaucoma Neg Hx     Macular degeneration Neg Hx     Retinal detachment Neg Hx     Strabismus Neg Hx     Thyroid disease Neg Hx      Review of Systems   Constitutional: Negative for activity change, chills, fever and unexpected weight change.   Respiratory: Negative for cough, chest tightness, shortness of breath and wheezing.    Cardiovascular: Negative for chest pain, palpitations and leg swelling.     Objective:     Vitals:    06/06/18 1130   BP: 138/64   Pulse: 82   SpO2: 96%   Weight: 70.8 kg (156 lb 1.4 oz)   Height: 5' 3" (1.6 m)   PainSc: 0-No pain     Body mass index is 27.65 kg/m².  Physical Exam   Constitutional: She appears well-developed and well-nourished.   Neck: No JVD present. No thyromegaly present.   Cardiovascular: Normal rate, normal heart sounds and intact distal pulses.    Pulmonary/Chest: Effort normal and breath sounds normal. No respiratory distress.     Assessment:     1. Lower urinary tract symptoms    2. Weakness      Plan:   Myesha was seen today for follow-up.    Diagnoses and all orders for this visit:    Lower urinary tract symptoms  -     Urinalysis  -     Urine culture    Weakness: resolved off of nifedipine      Follow up made     Medication List          Accurate as of 6/6/18 11:58 AM. If you have any questions, ask your nurse or doctor.               CHANGE how you take these medications    blood sugar diagnostic " Strp  Commonly known as:  ACCU-CHEK HECTOR  Uses Accu-Check Hector meter to test BG 4x/day  What changed:  additional instructions     valsartan 320 MG tablet  Commonly known as:  DIOVAN  Take 1 tablet (320 mg total) by mouth once daily.  What changed:  additional instructions        CONTINUE taking these medications    ACCU-CHEK HECTOR CONTROL SOLN Soln  Generic drug:  blood glucose control high,low     ACCU-CHEK HECTOR PLUS METER Misc  Generic drug:  blood-glucose meter     albuterol 90 mcg/actuation inhaler  Inhale 2 puffs into the lungs every 6 (six) hours as needed for Wheezing. Rescue     albuterol-ipratropium 2.5 mg-0.5 mg/3 mL nebulizer solution  Commonly known as:  DUO-NEB  Take 3 mLs by nebulization every 4 (four) hours. Rescue     azelastine 137 mcg (0.1 %) nasal spray  Commonly known as:  ASTELIN  1 spray (137 mcg total) by Nasal route 2 (two) times daily. For severe allergy     B-12 DOTS ORAL     carvedilol 25 MG tablet  Commonly known as:  COREG  Take 2 tablets (50 mg total) by mouth 2 (two) times daily.     cetirizine 5 MG tablet  Commonly known as:  ZYRTEC  Take 1 tablet (5 mg total) by mouth once daily.     chlorthalidone 25 MG Tab  Commonly known as:  HYGROTEN  TAKE 1 TABLET (25 MG TOTAL) BY MOUTH ONCE DAILY.     ENTERIC COATED ASPIRIN 81 MG EC tablet  Generic drug:  aspirin     FISH  mg Cap  Generic drug:  omega-3 fatty acids     fluticasone-salmeterol 250-50 mcg/dose 250-50 mcg/dose diskus inhaler  Commonly known as:  ADVAIR  Inhale 1 puff into the lungs 2 (two) times daily. This is a change from one month     glimepiride 2 MG tablet  Commonly known as:  AMARYL  TAKE 1 TABLET BY MOUTH daily     hydrALAZINE 100 MG tablet  Commonly known as:  APRESOLINE  TAKE 1 TABLET (100 MG TOTAL) BY MOUTH 3 (THREE) TIMES DAILY.     insulin glargine 100 unit/mL (3 mL) Inpn pen  Commonly known as:  LANTUS SOLOSTAR U-100 INSULIN  Inject 30 Units into the skin every evening.     * lancets Misc  Commonly known as:  " ACCU-CHEK SOFTCLIX LANCETS  Uses Accu-Chek Angelica meter to test BG 4x/day     * lancets 30 gauge Misc     lancing device Misc     linagliptin 5 mg Tab tablet  Commonly known as:  TRADJENTA  Take 1 tablet (5 mg total) by mouth once daily.     magnesium oxide 400 mg tablet  Commonly known as:  MAG-OX  Take 1 tablet (400 mg total) by mouth once daily.     metFORMIN 500 MG tablet  Commonly known as:  GLUCOPHAGE  TAKE 2 TABLETS (1,000 MG TOTAL) BY MOUTH 2 (TWO) TIMES DAILY WITH MEALS.     nitroGLYCERIN 0.4 MG SL tablet  Commonly known as:  NITROSTAT     pen needle, diabetic 31 gauge x 3/16" Ndle  Commonly known as:  BD ULTRA-FINE MINI PEN NEEDLE  USE WITH LANTUS AT BEDTIME     sertraline 25 MG tablet  Commonly known as:  ZOLOFT  Take 1 tablet (25 mg total) by mouth every evening.     simvastatin 20 MG tablet  Commonly known as:  ZOCOR  TAKE 1 TABLET (20 MG TOTAL) BY MOUTH EVERY EVENING.     VITAMIN D3 2,000 unit Tab  Generic drug:  cholecalciferol (vitamin D3)        * This list has 2 medication(s) that are the same as other medications prescribed for you. Read the directions carefully, and ask your doctor or other care provider to review them with you.                "

## 2018-06-07 ENCOUNTER — TELEPHONE (OUTPATIENT)
Dept: INTERNAL MEDICINE | Facility: CLINIC | Age: 78
End: 2018-06-07

## 2018-06-07 LAB
BACTERIA UR CULT: NORMAL
BACTERIA UR CULT: NORMAL

## 2018-06-08 ENCOUNTER — PATIENT OUTREACH (OUTPATIENT)
Dept: OTHER | Facility: OTHER | Age: 78
End: 2018-06-08

## 2018-06-08 ENCOUNTER — TELEPHONE (OUTPATIENT)
Dept: INTERNAL MEDICINE | Facility: CLINIC | Age: 78
End: 2018-06-08

## 2018-06-08 NOTE — PROGRESS NOTES
Last 5 Patient Entered Readings                                      Current 30 Day Average: 153/69     Recent Readings 6/8/2018 6/7/2018 6/6/2018 6/4/2018 6/1/2018    SBP (mmHg) 179 149 148 160 146    DBP (mmHg) 80 69 66 74 65    Pulse 78 81 80 77 71        Ms. Quinones had a high BP reading this morning. She will repeat her BP later. She denies any HA and CP or any other symptoms associated with elevated BP. She is no longer taking nifedipine due to weakness. She has been getting BP readings in the 140s more often. She will have her BP monitor checked again to ensure readings are accurate when she goes to her appointment next week.     Patient's BP average is above goal of <130/80.     Patient denies s/s of hypertension (SOB, CP, severe headaches, changes in vision) associated with high readings. Instructed patient to go to the ED if BP > 180/110 and accompanied by hypertensive s/s, patient confirms understanding.    Will continue to monitor regularly. Will follow up in 3-4 weeks, sooner if BP begins to trend upward or downward.    Patient has my contact information and knows to call with any concerns or clinical changes.     Current HTN regimen:  Hypertension Medications             carvedilol (COREG) 25 MG tablet Take 2 tablets (50 mg total) by mouth 2 (two) times daily.    chlorthalidone (HYGROTEN) 25 MG Tab TAKE 1 TABLET (25 MG TOTAL) BY MOUTH ONCE DAILY.    hydrALAZINE (APRESOLINE) 100 MG tablet TAKE 1 TABLET (100 MG TOTAL) BY MOUTH 3 (THREE) TIMES DAILY.    nitroGLYCERIN (NITROSTAT) 0.4 MG SL tablet Place 1 tablet under the tongue continuous prn.      valsartan (DIOVAN) 320 MG tablet Take 1 tablet (320 mg total) by mouth once daily.

## 2018-06-14 ENCOUNTER — OFFICE VISIT (OUTPATIENT)
Dept: SLEEP MEDICINE | Facility: CLINIC | Age: 78
End: 2018-06-14
Payer: MEDICARE

## 2018-06-14 VITALS
HEART RATE: 87 BPM | DIASTOLIC BLOOD PRESSURE: 56 MMHG | SYSTOLIC BLOOD PRESSURE: 125 MMHG | BODY MASS INDEX: 28.02 KG/M2 | WEIGHT: 158.13 LBS | HEIGHT: 63 IN

## 2018-06-14 DIAGNOSIS — I10 ESSENTIAL HYPERTENSION: Primary | ICD-10-CM

## 2018-06-14 DIAGNOSIS — G47.33 OSA (OBSTRUCTIVE SLEEP APNEA): ICD-10-CM

## 2018-06-14 DIAGNOSIS — I50.22 CHRONIC SYSTOLIC HEART FAILURE: ICD-10-CM

## 2018-06-14 PROCEDURE — 99499 UNLISTED E&M SERVICE: CPT | Mod: S$GLB,,, | Performed by: NURSE PRACTITIONER

## 2018-06-14 PROCEDURE — 3078F DIAST BP <80 MM HG: CPT | Mod: CPTII,S$GLB,, | Performed by: NURSE PRACTITIONER

## 2018-06-14 PROCEDURE — 99999 PR PBB SHADOW E&M-EST. PATIENT-LVL III: CPT | Mod: PBBFAC,,, | Performed by: NURSE PRACTITIONER

## 2018-06-14 PROCEDURE — 3074F SYST BP LT 130 MM HG: CPT | Mod: CPTII,S$GLB,, | Performed by: NURSE PRACTITIONER

## 2018-06-14 PROCEDURE — 99214 OFFICE O/P EST MOD 30 MIN: CPT | Mod: S$GLB,,, | Performed by: NURSE PRACTITIONER

## 2018-06-14 NOTE — PROGRESS NOTES
This 78 y.o. female returns to [facility] regarding management of obstructive sleep apnea and equipment check. She was last seen 5-3-18    Got new machine 5/14th.Using nightly but dislikes dreamwear mask, felt pressure was blowing too high. It was cpap 12cm. Her pressure was reduced to apap 9-13cm. And she went back to old nose mask. Could sleep better with it. Reports disrupted sleep though and  heard snoring just last night, otherwise snoring is resolved. She uses her chin strap. Having some sinus congestion. Wants new mask. Wgt stable. BP stable.   Interrogation- avg 5.3-7h/n. AHI 0.3, 0% periodic. 90% tile 9-10.2cm. % mask fit. 23/30d>4h.         HISTORY  9/26/16  She has been seen in the context of medical comorbidities of cardiomyopathy and hypertension. She previously had sleep symptoms of fragmented sleep with multiple arousals every 2 to 3 minutes and loud snoring. This has resolved with CPAP.    Not snoring with CPAP.  Can't sleep without CPAP. Sleeps better with it. Very fragmented sleep without it.    She is generally satisfied with CPAP at 11 cm on a nightly basis.   Chin strap still annoying but using with her nasal mask.  -She tolerates pressure   -Adequate humidity, heat at 2.5   -occasional oral drying    ESS = 11 (5)    She continues to feel more rested and energized during the day as a result of CPAP.     CPAP interrogtaion: F&P machine; set at 11 cm;   total usage 3468 hours (prior 5690 hours), blowing at 11.0 cm    DME = Access      5-3-18:  She continues to use cpap nightly. Sleep remains consolidated, less disrupted and snoring resolved. Eligible for new mask type and machine. Sleeps well with therapy. Snoring resolved. Uses chin strap. Has soclean machine ready to use with new machine.   Interrogation--manometer 12.4cm. Old nasal mask, old sleepstyle machine  2016 Echo:   Low normal to mildly depressed left ventricular systolic function (EF 50-55%).     2 - Left ventricular  "diastolic dysfunction.     3 - Severe left atrial enlargement.     4 - Normal right ventricular systolic function .     5 - Trivial to mild tricuspid regurgitation.     6 - Intermediate central venous pressure.     7 - Pulmonary hypertension. The estimated PA systolic pressure is 42 mmHg    4/2018   HgbA1c 6.7        BASELINE SLEEP STUDY 6/09: AHI 23.7, worse in supine, oxygen abi 77%. 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, with long-term current use of insulin 2/22/2018    COPD (chronic obstructive pulmonary disease)     COPD exacerbation 4/8/2018    Defibrillator discharge     Diabetes mellitus     Diabetes mellitus type II     Diabetes with neurologic complications     Goiter     MNG    HX: breast cancer     Hyperlipidemia     Hypertension     Iron deficiency anemia 5/16/2017    Left kidney mass     Meningioma     Osteoporosis, postmenopausal     Pacemaker     Skin cancer     s/p excision    Sleep apnea     CPAP    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/2015    mid forehead    Unspecified vitamin D deficiency     Ventricular tachycardia     Vitamin B12 deficiency     Vitamin D deficiency disease        EXAM  BP (!) 125/56   Pulse 87   Ht 5' 3" (1.6 m)   Wt 71.7 kg (158 lb 1.6 oz)   BMI 28.01 kg/m²    Well groomed, W/D, W/N      IMPRESSION: Obstructive sleep apnea, severe. Remains adherent with cpap, symptoms improved. 8-1418: Remains " adherent with PAP, since lowering cpap to apap 9-13cm and old mask using over her nose, tolerating pressure fine. Snoring resolved. Sleep just recently not as consolidated  She has medical comorbidities of non-ischemic cardiomyopathy, CAD (has ICD), asthma (recent exacerbation), pulmonary hypertension, DM with CKD and long term use insulin      PLAN:   1. Continue apap 9-13cm. [facility] DME alternative mask today. Notify office if sleep remains disrupted (avoid mask leaks and mouth leak/snug up chin strap and ensure optimal nasal patency). RTC 1 yr, sooner if neederd  2. Continue to see cards re: CAD/cardiomyopathy and pulmonary hypertension  3. Discussed effectiveness of her therapy and potential ramifications of untreated KHOA, including heart disease, hypertension, cognitive difficulties, stroke, and diabetes.    4. Trial REM fresh

## 2018-06-18 ENCOUNTER — TELEPHONE (OUTPATIENT)
Dept: INTERNAL MEDICINE | Facility: CLINIC | Age: 78
End: 2018-06-18

## 2018-06-18 ENCOUNTER — OFFICE VISIT (OUTPATIENT)
Dept: URGENT CARE | Facility: CLINIC | Age: 78
End: 2018-06-18
Payer: MEDICARE

## 2018-06-18 VITALS
WEIGHT: 155 LBS | HEART RATE: 90 BPM | DIASTOLIC BLOOD PRESSURE: 77 MMHG | SYSTOLIC BLOOD PRESSURE: 176 MMHG | BODY MASS INDEX: 27.46 KG/M2 | OXYGEN SATURATION: 94 % | TEMPERATURE: 99 F | HEIGHT: 63 IN | RESPIRATION RATE: 18 BRPM

## 2018-06-18 DIAGNOSIS — R06.2 WHEEZING: Primary | ICD-10-CM

## 2018-06-18 DIAGNOSIS — I44.7 LBBB (LEFT BUNDLE BRANCH BLOCK): Primary | ICD-10-CM

## 2018-06-18 DIAGNOSIS — R06.2 WHEEZING: ICD-10-CM

## 2018-06-18 DIAGNOSIS — J40 BRONCHITIS: Primary | ICD-10-CM

## 2018-06-18 PROCEDURE — 99214 OFFICE O/P EST MOD 30 MIN: CPT | Mod: 25,S$GLB,, | Performed by: FAMILY MEDICINE

## 2018-06-18 PROCEDURE — 3078F DIAST BP <80 MM HG: CPT | Mod: CPTII,S$GLB,, | Performed by: FAMILY MEDICINE

## 2018-06-18 PROCEDURE — 3077F SYST BP >= 140 MM HG: CPT | Mod: CPTII,S$GLB,, | Performed by: FAMILY MEDICINE

## 2018-06-18 PROCEDURE — 94640 AIRWAY INHALATION TREATMENT: CPT | Mod: 59,S$GLB,, | Performed by: FAMILY MEDICINE

## 2018-06-18 RX ORDER — BENZONATATE 100 MG/1
200 CAPSULE ORAL 3 TIMES DAILY PRN
Qty: 20 CAPSULE | Refills: 0 | Status: SHIPPED | OUTPATIENT
Start: 2018-06-18 | End: 2018-08-21 | Stop reason: SDUPTHER

## 2018-06-18 RX ORDER — IPRATROPIUM BROMIDE 0.5 MG/2.5ML
0.5 SOLUTION RESPIRATORY (INHALATION)
Status: COMPLETED | OUTPATIENT
Start: 2018-06-18 | End: 2018-06-18

## 2018-06-18 RX ORDER — METHYLPREDNISOLONE 4 MG/1
4 TABLET ORAL DAILY
Qty: 1 PACKAGE | Refills: 0 | Status: SHIPPED | OUTPATIENT
Start: 2018-06-18 | End: 2018-06-25

## 2018-06-18 RX ORDER — ALBUTEROL SULFATE 0.83 MG/ML
2.5 SOLUTION RESPIRATORY (INHALATION)
Status: COMPLETED | OUTPATIENT
Start: 2018-06-18 | End: 2018-06-18

## 2018-06-18 RX ORDER — AZITHROMYCIN 250 MG/1
TABLET, FILM COATED ORAL
Qty: 6 TABLET | Refills: 0 | Status: SHIPPED | OUTPATIENT
Start: 2018-06-18 | End: 2018-07-16 | Stop reason: ALTCHOICE

## 2018-06-18 RX ORDER — PREDNISONE 20 MG/1
20 TABLET ORAL DAILY
Qty: 10 TABLET | Refills: 0 | Status: SHIPPED | OUTPATIENT
Start: 2018-06-18 | End: 2018-06-26

## 2018-06-18 RX ADMIN — ALBUTEROL SULFATE 2.5 MG: 0.83 SOLUTION RESPIRATORY (INHALATION) at 04:06

## 2018-06-18 RX ADMIN — IPRATROPIUM BROMIDE 0.5 MG: 0.5 SOLUTION RESPIRATORY (INHALATION) at 04:06

## 2018-06-18 NOTE — PROGRESS NOTES
"Subjective:       Patient ID: Myesha Quinones is a 78 y.o. female.    Vitals:  height is 5' 3" (1.6 m) and weight is 70.3 kg (155 lb). Her oral temperature is 98.6 °F (37 °C). Her blood pressure is 176/77 (abnormal) and her pulse is 90. Her respiration is 18 and oxygen saturation is 94% (abnormal).     Chief Complaint: Cough    Cough for 1 week. Reports that she has a history of asthma.  She has a nebulizer at home but she is coughing up green sputum and usually her PCP treats her with antibiotics and steroid when she has a flare of bronchitis with wheezing like this.       Cough   This is a recurrent problem. The current episode started 1 to 4 weeks ago. The problem has been rapidly worsening. The cough is non-productive. Associated symptoms include shortness of breath. Pertinent negatives include no chest pain, chills, ear pain, eye redness, fever, headaches, myalgias, nasal congestion, postnasal drip, sore throat or wheezing. Nothing aggravates the symptoms. She has tried steroid inhaler for the symptoms. The treatment provided mild relief. Her past medical history is significant for asthma. There is no history of bronchitis, COPD or pneumonia.     Review of Systems   Constitution: Negative for chills, fever and malaise/fatigue.   HENT: Negative for congestion, ear pain, hoarse voice, postnasal drip and sore throat.    Eyes: Negative for discharge and redness.   Cardiovascular: Negative for chest pain, dyspnea on exertion and leg swelling.   Respiratory: Positive for cough and shortness of breath. Negative for sputum production and wheezing.    Musculoskeletal: Negative for myalgias.   Gastrointestinal: Negative for abdominal pain and nausea.   Neurological: Negative for headaches.       Objective:      Physical Exam   Constitutional: She is oriented to person, place, and time. She appears well-developed and well-nourished. She is cooperative.  Non-toxic appearance. She does not appear ill. No distress. "   HENT:   Head: Normocephalic and atraumatic.   Right Ear: External ear normal. Decreased hearing is noted.   Left Ear: External ear normal. Decreased hearing is noted.   Nose: Nose normal. No mucosal edema, rhinorrhea or nasal deformity. No epistaxis. Right sinus exhibits no maxillary sinus tenderness and no frontal sinus tenderness. Left sinus exhibits no maxillary sinus tenderness and no frontal sinus tenderness.   Mouth/Throat: Uvula is midline, oropharynx is clear and moist and mucous membranes are normal. No trismus in the jaw. Normal dentition. No uvula swelling. No oropharyngeal exudate, posterior oropharyngeal edema or posterior oropharyngeal erythema.   Posterior throat is dry appearing.  Bilateral hearing aids.  Ear canals bilaterally have wax and debris noted.    Eyes: Conjunctivae and lids are normal. No scleral icterus.   Sclera clear bilat   Neck: Trachea normal, full passive range of motion without pain and phonation normal. Neck supple.   Cardiovascular: Normal rate, regular rhythm, normal heart sounds, intact distal pulses and normal pulses.    Pulmonary/Chest: Effort normal. No respiratory distress. She has decreased breath sounds. She has wheezes. She has no rhonchi. She has no rales.   Patient has a deep and harsh cough.  Prior to treatment she has more decreased sounds in upper lung fields with some expiratory wheezes.     Following her breathing treatment, she reports feeling more comfortable.  She did not cough up sputum while she was here.  She has mild expiratory wheezes in upper lung fields following treatment. She is moving air better following her treatment.    Abdominal: Soft. Normal appearance and bowel sounds are normal. She exhibits no distension. There is no tenderness.   Musculoskeletal: Normal range of motion. She exhibits no edema or deformity.   Neurological: She is alert and oriented to person, place, and time. She exhibits normal muscle tone. Coordination normal.   Skin: Skin  is warm, dry and intact. She is not diaphoretic. No pallor.   Psychiatric: She has a normal mood and affect. Her speech is normal and behavior is normal. Judgment and thought content normal. Cognition and memory are normal.   Nursing note and vitals reviewed.      Assessment:       1. Bronchitis    2. Wheezing        Plan:         Bronchitis  -     azithromycin (ZITHROMAX Z-ERYN) 250 MG tablet; Take 2 tablets (500 mg) on  Day 1,  followed by 1 tablet (250 mg) once daily on Days 2 through 5.  Dispense: 6 tablet; Refill: 0  -     benzonatate (TESSALON PERLES) 100 MG capsule; Take 2 capsules (200 mg total) by mouth 3 (three) times daily as needed for Cough.  Dispense: 20 capsule; Refill: 0    Wheezing  -     albuterol nebulizer solution 2.5 mg; Take 3 mLs (2.5 mg total) by nebulization one time.  -     ipratropium 0.02 % nebulizer solution 0.5 mg; Take 2.5 mLs (0.5 mg total) by nebulization one time.  -     methylPREDNISolone (MEDROL DOSEPACK) 4 mg tablet; Take 1 tablet (4 mg total) by mouth once daily. use as directed  Dispense: 1 Package; Refill: 0      Follow Up Comments   Make sure that you follow up with your primary care doctor in the next 2-5 days if needed .  Return to the Urgent Care if signs or symptoms change and certainly if you have worsening symptoms go to the nearest emergency department for further evaluation.

## 2018-06-18 NOTE — PATIENT INSTRUCTIONS
Bronchitis with Wheezing (Viral or Bacterial: Adult)    Bronchitis is an infection of the air passages. It often occurs during a cold and is usually caused by a virus. Symptoms include cough with mucus (phlegm) and low-grade fever. This illness is contagious during the first few days and is spread through the air by coughing and sneezing, or by direct contact (touching the sick person and then touching your own eyes, nose, or mouth).  If there is a lot of inflammation, air flow is restricted. The air passages may also go into spasm, especially if you have asthma. This causes wheezing and difficulty breathing even in people who do not have asthma.  Bronchitis usually lasts 7 to 14 days. The wheezing should improve with treatment during the first week. An inhaler is often prescribed to relax the air passages and stop wheezing. Antibiotics will be prescribed if your doctor thinks there is also a secondary bacterial infection.  Home care  · If symptoms are severe, rest at home for the first 2 to 3 days. When you go back to your usual activities, don't let yourself get too tired.  · Do not smoke. Also avoid being exposed to secondhand smoke.  · You may use over-the-counter medicine to control fever or pain, unless another medicine was prescribed. Note: If you have chronic liver or kidney disease or have ever had a stomach ulcer or gastrointestinal bleeding, talk with your healthcare provider before using these medicines. Also talk to your provider if you are taking medicine to prevent blood clots.) Aspirin should never be given to anyone younger than 18 years of age who is ill with a viral infection or fever. It may cause severe liver or brain damage.  · Your appetite may be poor, so a light diet is fine. Avoid dehydration by drinking 6 to 8 glasses of fluids per day (such as water, soft drinks, sports drinks, juices, tea, or soup). Extra fluids will help loosen secretions in the nose and lungs.  · Over-the-counter  cough, cold, and sore-throat medicines will not shorten the length of the illness, but they may be helpful to reduce symptoms. (Note: Do not use decongestants if you have high blood pressure.)  · If you were given an inhaler, use it exactly as directed. If you need to use it more often than prescribed, your condition may be worsening. If this happens, contact your healthcare provider.  · If prescribed, finish all antibiotic medicine, even if you are feeling better after only a few days.  Follow-up care  Follow up with your healthcare provider, or as advised. If you had an X-ray or ECG (electrocardiogram), a specialist will review it. You will be notified of any new findings that may affect your care.  Note: If you are age 65 or older, or if you have a chronic lung disease or condition that affects your immune system, or you smoke, talk to your healthcare provider about having a pneumococcal vaccinations and a yearly influenza vaccination (flu shot).  When to seek medical advice  Call your healthcare provider right away if any of these occur:  · Fever of 100.4°F (38°C) or higher  · Coughing up increasing amounts of colored sputum  · Weakness, drowsiness, headache, facial pain, ear pain, or a stiff neck  Call 911, or get immediate medical care  Contact emergency services right away if any of these occur.  · Coughing up blood  · Worsening weakness, drowsiness, headache, or stiff neck  · Increased wheezing not helped with medication, shortness of breath, or pain with breathing  Date Last Reviewed: 9/13/2015  © 2769-9821 [employer]. [address] [place] [address], [place] [address]. All rights reserved. This information is not intended as a substitute for professional medical care. Always follow your healthcare professional's instructions.       While in Urgent Care today, we discussed the need for you to check your blood sugars at least once daily for a few days after getting a steroid shot.  The only reason to  adjust medications would be elevation of blood sugar over 250.  Sometimes steroids can make blood sugar elevate.     You can certainly call the Urgent Care if you have any questions and contact your PCP at your next available opportunity.     YOU APPEAR TO ALREADY HAVE A PRESCRIPTION FOR ALBUTEROL WITH IPRATROPIUM FOR YOUR NEBULIZER AT HOME. USE THIS FOUR TIMES DAILY.       Myesha was seen today for cough.    Diagnoses and all orders for this visit:    Bronchitis  -     azithromycin (ZITHROMAX Z-ERYN) 250 MG tablet; Take 2 tablets (500 mg) on  Day 1,  followed by 1 tablet (250 mg) once daily on Days 2 through 5.    Wheezing  -     albuterol nebulizer solution 2.5 mg; Take 3 mLs (2.5 mg total) by nebulization one time.  -     ipratropium 0.02 % nebulizer solution 0.5 mg; Take 2.5 mLs (0.5 mg total) by nebulization one time.  -     methylPREDNISolone (MEDROL DOSEPACK) 4 mg tablet; Take 1 tablet (4 mg total) by mouth once daily. use as directed            Follow Up Comments   Make sure that you follow up with your primary care doctor in the next 2-5 days if needed .  Return to the Urgent Care if signs or symptoms change and certainly if you have worsening symptoms go to the nearest emergency department for further evaluation.     Ann Marks MD

## 2018-06-18 NOTE — TELEPHONE ENCOUNTER
----- Message from Adam Clark sent at 6/18/2018  8:08 AM CDT -----  Contact: Patient [phone]  Patient is requesting Rx to be sent to pharmacy below prednisone for asthma  and Rx for Cough, which stared for over several days ago.    Pharmacy: [id number] - [place] - [address] AT [facility] [phone] (Phone) [phone] (Fax)    Please call and advise  Thank you

## 2018-06-26 ENCOUNTER — OFFICE VISIT (OUTPATIENT)
Dept: OTOLARYNGOLOGY | Facility: CLINIC | Age: 78
End: 2018-06-26
Payer: MEDICARE

## 2018-06-26 VITALS
SYSTOLIC BLOOD PRESSURE: 109 MMHG | TEMPERATURE: 97 F | WEIGHT: 153.31 LBS | HEIGHT: 63 IN | BODY MASS INDEX: 27.16 KG/M2 | DIASTOLIC BLOOD PRESSURE: 62 MMHG | HEART RATE: 103 BPM

## 2018-06-26 DIAGNOSIS — H83.8X2 SUPERIOR SEMICIRCULAR CANAL DEHISCENCE OF LEFT EAR: Primary | ICD-10-CM

## 2018-06-26 DIAGNOSIS — J31.0 CHRONIC RHINITIS: ICD-10-CM

## 2018-06-26 DIAGNOSIS — H69.92 ETD (EUSTACHIAN TUBE DYSFUNCTION), LEFT: ICD-10-CM

## 2018-06-26 DIAGNOSIS — H90.A32 MIXED CONDUCTIVE AND SENSORINEURAL HEARING LOSS OF LEFT EAR WITH RESTRICTED HEARING OF RIGHT EAR: ICD-10-CM

## 2018-06-26 PROCEDURE — 99999 PR PBB SHADOW E&M-EST. PATIENT-LVL III: CPT | Mod: PBBFAC,,, | Performed by: OTOLARYNGOLOGY

## 2018-06-26 PROCEDURE — 99214 OFFICE O/P EST MOD 30 MIN: CPT | Mod: S$GLB,,, | Performed by: OTOLARYNGOLOGY

## 2018-06-26 PROCEDURE — 3074F SYST BP LT 130 MM HG: CPT | Mod: CPTII,S$GLB,, | Performed by: OTOLARYNGOLOGY

## 2018-06-26 PROCEDURE — 3078F DIAST BP <80 MM HG: CPT | Mod: CPTII,S$GLB,, | Performed by: OTOLARYNGOLOGY

## 2018-06-26 RX ORDER — FLUTICASONE PROPIONATE 50 MCG
2 SPRAY, SUSPENSION (ML) NASAL DAILY
Qty: 1 BOTTLE | Refills: 12 | Status: SHIPPED | OUTPATIENT
Start: 2018-06-26 | End: 2018-07-30

## 2018-06-26 RX ORDER — LEVOCETIRIZINE DIHYDROCHLORIDE 5 MG/1
5 TABLET, FILM COATED ORAL NIGHTLY
Qty: 30 TABLET | Refills: 11 | Status: SHIPPED | OUTPATIENT
Start: 2018-06-26 | End: 2019-06-28 | Stop reason: SDUPTHER

## 2018-06-26 NOTE — PROGRESS NOTES
"  Chief Complaint   Patient presents with    Follow-up    Hearing Loss   .     HPI:  Myesha Quinones is a very pleasant 78 y.o. female here to see me today for the first time for evaluation of hearing loss.  She states that she has had lifelong ear problems.  She reports that as a child she pulled a cup of scalding hot coffee on and inside her left ear which resulted in an eardrum perforation. She states ever since this incident she "has never had normal hearing or ear."  She relays that in 1998 she had breast cancer and had hearing loss associated with the chemotherapy and obtained her first set of hearing aids in 1999.  She is here today to establish with ENT as her prior ENT Dr. Tanner no longer takes her insurance. She was referred for evaluation of her ears and for to evaluate for cerumen removal as her hearing aids have not been working as well recently.  She reports hearing loss that has been gradually progressing over the last several years.  She has not had any recent issues with ear pain or ear drainage.  She denies a family history of hearing loss, and has not had any previous otologic surgery.  She denies any history of significant loud noise exposure.She denies issues with dizziness.    Interval HPI:  She reports that her hearing seems stable.  She feels aural fullness "fluid sensation" in the left ear. She denies ear pain or otorrhea. She continues on Astelin and Claritin but  does not feel a major improvement in her aural fullness or hearing.          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, with long-term current use of insulin 2/22/2018    COPD (chronic obstructive pulmonary disease)     COPD exacerbation 4/8/2018    Defibrillator discharge     Diabetes mellitus     Diabetes mellitus type II     Diabetes with neurologic complications     Goiter     MNG    HX: breast cancer     Hyperlipidemia     Hypertension     Iron deficiency anemia 5/16/2017    Left kidney mass     Meningioma     Osteoporosis, postmenopausal     Pacemaker     Skin cancer     s/p excision    Sleep apnea     CPAP    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/2015    mid forehead    Unspecified vitamin D deficiency     Ventricular tachycardia     Vitamin B12 deficiency     Vitamin D deficiency disease      Social History     Social History    Marital status:      Spouse name: N/A    Number of children: N/A    Years of education: N/A     Occupational History    Homemaker      Other     Social History Main Topics    Smoking status: Never Smoker    Smokeless tobacco: Never Used    Alcohol use No    Drug use: No    Sexual activity: Yes     Partners: Male     Other Topics Concern    Are You Pregnant Or Think You May Be? 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    FRACTURE SURGERY      left elbow and wrist as a child    HYSTERECTOMY      MASTECTOMY Right     SQUAMOUS CELL CARCINOMA EXCISION      remved from forehead    TONSILLECTOMY       Family History   Problem  Relation Age of Onset    Diabetes Father     Heart disease Father     Diabetes Sister     Heart disease Sister     Diabetes Brother     Heart disease Brother     Hypertension Brother     Diabetes Brother     Heart disease Brother     Hypertension Brother     Diabetes Brother     Heart disease Brother     Cancer Brother         colon    Diabetes Brother     Cancer Son         skin    Diabetes Son         prediabetes    Diabetes Daughter         prediabetes    Cancer Daughter         melanoma    Obesity Daughter     Melanoma Daughter     Diabetes Son     Asthma Mother     Hypertension Mother     Stroke Mother     No Known Problems Maternal Grandmother     No Known Problems Maternal Grandfather     No Known Problems Paternal Grandmother     No Known Problems Paternal Grandfather     Amblyopia Neg Hx     Blindness Neg Hx     Cataracts Neg Hx     Glaucoma Neg Hx     Macular degeneration Neg Hx     Retinal detachment Neg Hx     Strabismus Neg Hx     Thyroid disease Neg Hx          Review of Systems  General: negative for chills, fever or weight loss  Psychological: negative for mood changes or depression  Ophthalmic: negative for blurry vision, photophobia or eye pain  ENT: see HPI  Respiratory: no cough, shortness of breath, or wheezing  Cardiovascular: no chest pain or dyspnea on exertion  Gastrointestinal: no abdominal pain, change in bowel habits, or black/ bloody stools  Musculoskeletal: negative for gait disturbance or muscular weakness  Neurological: no syncope or seizures; no ataxia  Dermatological: negative for puritis,  rash and jaundice  Hematologic/lymphatic: no easy bruising, no new lumps or bumps      Physical Exam:    Vitals:    06/26/18 0933   BP: 109/62   Pulse: 103   Temp: 97.3 °F (36.3 °C)       Constitutional: Well appearing / communicating without difficutly.  NAD.  Eyes: EOM I Bilaterally  Head/Face: Normocephalic.  Negative paranasal sinus pressure/tenderness.   Salivary glands WNL.  House Brackmann I Bilaterally.    Right Ear: Auricle normal appearance. External Auditory Canal within normal limits no lesions or masses,TM w/o masses/lesions/perforationsp; monomeric segment centrally.  TM mobility noted.   Left Ear: Auricle normal appearance. External Auditory Canal within normal limits no lesions or masses,TM w/ thickened with tympanosclerosis present.  No effusion. TM mobility reduced.   Rinne Air conduction >bone conduction bilaterally, Herrera midline.   Nose: No gross nasal septal deviation. Inferior Turbinates 3+ bilaterally. No septal perforation. No masses/lesions. External nasal skin appears normal without masses/lesions.  Oral Cavity: Gingiva/lips within normal limits.  Dentition/gingiva healthy appearing. Mucus membranes moist. Floor of mouth soft, no masses palpated. Oral Tongue mobile. Hard Palate appears normal.    Oropharynx: Base of tongue appears normal. No masses/lesions noted. Tonsillar fossa/pharyngeal wall without lesions. Posterior oropharynx WNL.  Soft palate without masses. Midline uvula.   Neck/Lymphatic: No LAD I-VI bilaterally.  No thyromegaly.  No masses noted on exam.    Mirror laryngoscopy/nasopharyngoscopy: Active gag reflex.  Unable to perform.    Neuro/Psychiatric: AOx3.  Normal mood and affect.   Cardiovascular: Normal carotid pulses bilaterally, no increasing jugular venous distention noted at cervical region bilaterally.    Respiratory: Normal respiratory effort, no stridor, no retractions noted.    Diagnostic testing ordered and reviewed:    CT temporal bones 1/31/2018:   Mild soft tissue thickening of the left tympanic membrane.  No middle ear/ mastoid involvement.    Mild dehiscence of the left superior semicircular canal. Right temporal bone unremarkable.      Assessment:    ICD-10-CM ICD-9-CM    1. 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 Mixed conductive and sensorineural hearing loss of left  ear with restricted hearing of right ear H90.A32 389.22    3. ETD (Eustachian tube dysfunction), left H69.82 381.81    4. 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 Diagnoses of Mixed conductive and sensorineural hearing loss of left ear with restricted hearing of right ear, ETD (Eustachian tube dysfunction), left, and Chronic rhinitis were also pertinent to this visit.      Plan:  No orders of the defined types were placed in this encounter.     I again discussed in detail her diagnosis of left semicircular canal dehiscence and how this affects the hearing.  I discussed options for treatment including continuing conservative measure of amplification via hearing aids versus surgical management of semicircular canal dehiscence. We discussed the risk/benefits and overall expected outcomes of each option. The patient wishes to continue with hearing aids.  Next Audiogram due Jan. 2019.    Allergic rhinitis.  I recommend starting Flonase and  Xyzal.       25 minutes total time was spent with the patient with over 50% of the time spent counseling regarding above.     Brittany Metcalf MD

## 2018-06-28 ENCOUNTER — PROCEDURE VISIT (OUTPATIENT)
Dept: OPHTHALMOLOGY | Facility: CLINIC | Age: 78
End: 2018-06-28
Payer: MEDICARE

## 2018-06-28 DIAGNOSIS — H35.033 HYPERTENSIVE RETINOPATHY, BILATERAL: ICD-10-CM

## 2018-06-28 DIAGNOSIS — E11.3213 CONTROLLED TYPE 2 DIABETES MELLITUS WITH BOTH EYES AFFECTED BY MILD NONPROLIFERATIVE RETINOPATHY AND MACULAR EDEMA, WITH LONG-TERM CURRENT USE OF INSULIN: Primary | ICD-10-CM

## 2018-06-28 DIAGNOSIS — Z79.4 CONTROLLED TYPE 2 DIABETES MELLITUS WITH BOTH EYES AFFECTED BY MILD NONPROLIFERATIVE RETINOPATHY AND MACULAR EDEMA, WITH LONG-TERM CURRENT USE OF INSULIN: Primary | ICD-10-CM

## 2018-06-28 PROCEDURE — 99499 UNLISTED E&M SERVICE: CPT | Mod: S$GLB,,, | Performed by: OPHTHALMOLOGY

## 2018-06-28 PROCEDURE — 67028 INJECTION EYE DRUG: CPT | Mod: LT,S$GLB,, | Performed by: OPHTHALMOLOGY

## 2018-06-28 RX ADMIN — Medication 1.25 MG: at 02:06

## 2018-06-28 NOTE — PATIENT INSTRUCTIONS

## 2018-06-28 NOTE — PROGRESS NOTES
"HPI     4 week check/OCT/avastin/possible ozurdex if approved  DLS- 05/31/2018 Dr. Arechiga     Pt sts os decline since last visit. Denies pain.   (-)Flashes (+)Floaters  (-)Photophobia  (-)Glare      No gtts     HPI     1 mo / OCT / Avastin   DLS- 04/26/2018 Dr. Arechiga     Pt sts no change in va since last visit denies pain   (-)Flashes (-)Floaters  (-)Photophobia  (-)Glare        OCT - OD No ME  OS - central - improving again    Prior FA - Foveal MA's OS   No NV of NP OU      A/P    1. 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 DME OS  S/p Avastin OS x 4  Foveal MA"s OS - may need chronic pharmacotherapy    Avastin OS    Get approval ozurdex     3. HTN Ret OU  BS/BP/chol control    4. PCIOL OU  With small PCO      1 month OCT    Risks, benefits, and alternatives to treatment discussed in detail with the patient.  The patient voiced understanding and wished to proceed with the procedure    Injection Procedure Note:  Diagnosis: DME OS    Topical Proparacaine and Betadine. Conj Prep only  Inject Avastin OS at 6:00 @ 3.5-4mm posterior to limbus  Post Operative Dx: Same  Complications: None  Follow up as above.    "

## 2018-07-09 ENCOUNTER — LAB VISIT (OUTPATIENT)
Dept: LAB | Facility: HOSPITAL | Age: 78
End: 2018-07-09
Attending: INTERNAL MEDICINE
Payer: MEDICARE

## 2018-07-09 DIAGNOSIS — Z79.4 TYPE 2 DIABETES MELLITUS WITH STAGE 3 CHRONIC KIDNEY DISEASE, WITH LONG-TERM CURRENT USE OF INSULIN: ICD-10-CM

## 2018-07-09 DIAGNOSIS — N18.30 TYPE 2 DIABETES MELLITUS WITH STAGE 3 CHRONIC KIDNEY DISEASE, WITH LONG-TERM CURRENT USE OF INSULIN: ICD-10-CM

## 2018-07-09 DIAGNOSIS — E11.22 TYPE 2 DIABETES MELLITUS WITH STAGE 3 CHRONIC KIDNEY DISEASE, WITH LONG-TERM CURRENT USE OF INSULIN: ICD-10-CM

## 2018-07-09 LAB
ESTIMATED AVG GLUCOSE: 163 MG/DL
HBA1C MFR BLD HPLC: 7.3 %

## 2018-07-09 PROCEDURE — 83036 HEMOGLOBIN GLYCOSYLATED A1C: CPT

## 2018-07-09 PROCEDURE — 36415 COLL VENOUS BLD VENIPUNCTURE: CPT | Mod: PO

## 2018-07-11 ENCOUNTER — PATIENT OUTREACH (OUTPATIENT)
Dept: OTHER | Facility: OTHER | Age: 78
End: 2018-07-11

## 2018-07-11 NOTE — PROGRESS NOTES
Last 5 Patient Entered Readings                                      Current 30 Day Average: 150/70     Recent Readings 7/10/2018 7/9/2018 7/6/2018 7/5/2018 7/2/2018    SBP (mmHg) 143 152 149 151 130    DBP (mmHg) 66 72 69 70 63    Pulse 73 80 82 82 81        Ms. Quinones's BP average is stable. It is still above goal on current regimen, but improved. She will be seeing Dr. Fernando for follow up in 2 weeks. Will follow up after this visit.     Ms. Quinones does not currently have a prescription from manufacturers affected by the recall. Will continue current medication regimen.      Patient's BP average is above goal of <130/80.     Patient denies s/s of hypertension (SOB, CP, severe headaches, changes in vision) associated with high readings. Instructed patient to go to the ED if BP > 180/110 and accompanied by hypertensive s/s, patient confirms understanding.    Will continue to monitor regularly. Will follow up in 3-4 weeks, sooner if BP begins to trend upward or downward.    Patient has my contact information and knows to call with any concerns or clinical changes.     Current HTN regimen:  Hypertension Medications             carvedilol (COREG) 25 MG tablet Take 2 tablets (50 mg total) by mouth 2 (two) times daily.    chlorthalidone (HYGROTEN) 25 MG Tab TAKE 1 TABLET (25 MG TOTAL) BY MOUTH ONCE DAILY.    hydrALAZINE (APRESOLINE) 100 MG tablet TAKE 1 TABLET (100 MG TOTAL) BY MOUTH 3 (THREE) TIMES DAILY.    nitroGLYCERIN (NITROSTAT) 0.4 MG SL tablet Place 1 tablet under the tongue continuous prn.      valsartan (DIOVAN) 320 MG tablet Take 1 tablet (320 mg total) by mouth once daily.

## 2018-07-16 ENCOUNTER — TELEPHONE (OUTPATIENT)
Dept: ENDOCRINOLOGY | Facility: CLINIC | Age: 78
End: 2018-07-16

## 2018-07-16 ENCOUNTER — OFFICE VISIT (OUTPATIENT)
Dept: ENDOCRINOLOGY | Facility: CLINIC | Age: 78
End: 2018-07-16
Payer: MEDICARE

## 2018-07-16 VITALS
SYSTOLIC BLOOD PRESSURE: 158 MMHG | WEIGHT: 158.06 LBS | HEIGHT: 63 IN | RESPIRATION RATE: 16 BRPM | DIASTOLIC BLOOD PRESSURE: 78 MMHG | BODY MASS INDEX: 28 KG/M2 | HEART RATE: 64 BPM

## 2018-07-16 DIAGNOSIS — E04.2 NONTOXIC MULTINODULAR GOITER: Primary | ICD-10-CM

## 2018-07-16 DIAGNOSIS — E04.2 NONTOXIC MULTINODULAR GOITER: ICD-10-CM

## 2018-07-16 DIAGNOSIS — E78.00 PURE HYPERCHOLESTEROLEMIA: ICD-10-CM

## 2018-07-16 DIAGNOSIS — Z79.4 CONTROLLED TYPE 2 DIABETES MELLITUS WITH BOTH EYES AFFECTED BY MILD NONPROLIFERATIVE RETINOPATHY AND MACULAR EDEMA, WITH LONG-TERM CURRENT USE OF INSULIN: Primary | ICD-10-CM

## 2018-07-16 DIAGNOSIS — E11.3213 CONTROLLED TYPE 2 DIABETES MELLITUS WITH BOTH EYES AFFECTED BY MILD NONPROLIFERATIVE RETINOPATHY AND MACULAR EDEMA, WITH LONG-TERM CURRENT USE OF INSULIN: Primary | ICD-10-CM

## 2018-07-16 DIAGNOSIS — E11.22 TYPE 2 DIABETES MELLITUS WITH STAGE 3 CHRONIC KIDNEY DISEASE, WITH LONG-TERM CURRENT USE OF INSULIN: ICD-10-CM

## 2018-07-16 DIAGNOSIS — I50.22 CHRONIC SYSTOLIC HEART FAILURE: ICD-10-CM

## 2018-07-16 DIAGNOSIS — Z79.4 TYPE 2 DIABETES MELLITUS WITH STAGE 3 CHRONIC KIDNEY DISEASE, WITH LONG-TERM CURRENT USE OF INSULIN: ICD-10-CM

## 2018-07-16 DIAGNOSIS — G47.33 OSA (OBSTRUCTIVE SLEEP APNEA): ICD-10-CM

## 2018-07-16 DIAGNOSIS — E11.42 DIABETIC SENSORIMOTOR NEUROPATHY: ICD-10-CM

## 2018-07-16 DIAGNOSIS — N18.30 TYPE 2 DIABETES MELLITUS WITH STAGE 3 CHRONIC KIDNEY DISEASE, WITH LONG-TERM CURRENT USE OF INSULIN: ICD-10-CM

## 2018-07-16 DIAGNOSIS — I42.8 NONISCHEMIC CARDIOMYOPATHY: ICD-10-CM

## 2018-07-16 DIAGNOSIS — E11.42 DIABETIC POLYNEUROPATHY ASSOCIATED WITH TYPE 2 DIABETES MELLITUS: ICD-10-CM

## 2018-07-16 DIAGNOSIS — I25.10 CORONARY ARTERY DISEASE INVOLVING NATIVE CORONARY ARTERY OF NATIVE HEART WITHOUT ANGINA PECTORIS: ICD-10-CM

## 2018-07-16 DIAGNOSIS — N18.30 CHRONIC KIDNEY DISEASE, STAGE 3, MOD DECREASED GFR: ICD-10-CM

## 2018-07-16 PROBLEM — J44.1 COPD EXACERBATION: Status: RESOLVED | Noted: 2018-04-08 | Resolved: 2018-07-16

## 2018-07-16 PROCEDURE — 99999 PR PBB SHADOW E&M-EST. PATIENT-LVL III: CPT | Mod: PBBFAC,,, | Performed by: NURSE PRACTITIONER

## 2018-07-16 PROCEDURE — 99214 OFFICE O/P EST MOD 30 MIN: CPT | Mod: S$GLB,,, | Performed by: NURSE PRACTITIONER

## 2018-07-16 RX ORDER — METFORMIN HYDROCHLORIDE 500 MG/1
1000 TABLET ORAL 2 TIMES DAILY WITH MEALS
Qty: 360 TABLET | Refills: 3 | Status: ON HOLD | OUTPATIENT
Start: 2018-07-16 | End: 2018-12-17 | Stop reason: SDUPTHER

## 2018-07-16 RX ORDER — GLIMEPIRIDE 2 MG/1
TABLET ORAL
Qty: 90 TABLET | Refills: 3 | Status: SHIPPED | OUTPATIENT
Start: 2018-07-16 | End: 2019-01-15

## 2018-07-16 NOTE — PROGRESS NOTES
Reviewed us with Dr. Clemente. Nodules that meet criteria for FNA were both biopsied in 2014 and 2016. Both had benign pathology. No need to biopsy at this time. Repeat US in 1 year

## 2018-07-16 NOTE — PROGRESS NOTES
CC: Patient is here for management of Type 2 diabetes complicated by retinopathy and neuropathy and review of medical conditions as listed in visit diagnosis. She arrives alone today.      HPI:  Mrs. Myesha Quinones is a 78 y.o. White female who was diagnosed with Type 2 DM in 1992. She has a FH of diabetes, reporting her 3 children all have diabetes. No hospitalizations for DM.   Last year, she had difficulty affording DM regimen but was able to obtain the medication through patient assistance.    Last seen by me 2/2018.   + weight loss since then.  A1c trended up slightly but reports significantly less fasting hypoglycemia.   Had repeat thyroid U/S per Dr. Gaston done 5/2018.     CURRENT DIABETIC MEDS: Lantus 30 units QHS, Tradjenta 5 mg daily, Glimepiride 2 mg AM, Metformin 500 mg BK and RUTHIE, then 1000mg DIN    No missed doses of diabetes medications.     Monitoring BG readings 1- 2x/day. Brought logs to clinic for June/ July:  FBS 65,   Pre lunch 138, 210, 181, 143  Pre dinner 150, 160, 98, 166  Bedtime 190  + hypoglycemia, upon waking, treats with breakfast     Good appetite. 3 meals daily. Occ snacks between meals on peanuts/ mini candy bar/ chips. Eats out and at home. Drinks water.     Active with yardwork.     Last Podiatry Exam: May 2018    REVIEW OF SYSTEMS  General: no weakness, + occasional fatigue; + weight loss 8#, home scale 155#  Eyes: no double or blurred vision, eye pain, or redness; + monthly injections per Dr. Arechiga; last eye exam= 6/2018  Cardiovascular: no chest pain, palpitations, edema, or murmurs.   Respiratory: wearing CPAP nightly; no cough or dyspnea.   GI: no heartburn, nausea, or changes in bowel patterns; good appetite.   Skin: no rashes, dryness, itching, or reactions at insulin injection sites.   Neuro: numbness, tingling in right fingers that is worse in the morning.   Endocrine: no polyuria, polydipsia, polyphagia, heat or cold intolerance.     Vital Signs  BP (!)  "158/78 (BP Location: Left arm, Patient Position: Sitting, BP Method: Medium (Manual))   Pulse 64   Resp 16   Ht 5' 3" (1.6 m)   Wt 71.7 kg (158 lb 1.1 oz)   BMI 28.00 kg/m²     Hemoglobin A1C   Date Value Ref Range Status   07/09/2018 7.3 (H) 4.0 - 5.6 % Final     Comment:     ADA Screening Guidelines:  5.7-6.4%  Consistent with prediabetes  >or=6.5%  Consistent with diabetes  High levels of fetal hemoglobin interfere with the HbA1C  assay. Heterozygous hemoglobin variants (HbS, HgC, etc)do  not significantly interfere with this assay.   However, presence of multiple variants may affect accuracy.     04/08/2018 6.7 (H) 4.0 - 5.6 % Final     Comment:     According to ADA guidelines, hemoglobin A1c <7.0% represents  optimal control in non-pregnant diabetic patients. Different  metrics may apply to specific patient populations.   Standards of Medical Care in Diabetes-2016.  For the purpose of screening for the presence of diabetes:  <5.7%     Consistent with the absence of diabetes  5.7-6.4%  Consistent with increasing risk for diabetes   (prediabetes)  >or=6.5%  Consistent with diabetes  Currently, no consensus exists for use of hemoglobin A1c  for diagnosis of diabetes for children.  This Hemoglobin A1c assay has significant interference with fetal   hemoglobin   (HbF). The results are invalid for patients with abnormal amounts of   HbF,   including those with known Hereditary Persistence   of Fetal Hemoglobin. Heterozygous hemoglobin variants (HbAS, HbAC,   HbAD, HbAE, HbA2) do not significantly interfere with this assay;   however, presence of multiple variants in a sample may impact the %   interference.     02/08/2018 6.6 (H) 4.0 - 5.6 % Final     Comment:     According to ADA guidelines, hemoglobin A1c <7.0% represents  optimal control in non-pregnant diabetic patients. Different  metrics may apply to specific patient populations.   Standards of Medical Care in Diabetes-2016.  For the purpose of screening " for the presence of diabetes:  <5.7%     Consistent with the absence of diabetes  5.7-6.4%  Consistent with increasing risk for diabetes   (prediabetes)  >or=6.5%  Consistent with diabetes  Currently, no consensus exists for use of hemoglobin A1c  for diagnosis of diabetes for children.  This Hemoglobin A1c assay has significant interference with fetal   hemoglobin   (HbF). The results are invalid for patients with abnormal amounts of   HbF,   including those with known Hereditary Persistence   of Fetal Hemoglobin. Heterozygous hemoglobin variants (HbAS, HbAC,   HbAD, HbAE, HbA2) do not significantly interfere with this assay;   however, presence of multiple variants in a sample may impact the %   interference.        Lab Results   Component Value Date    CREATININE 1.3 05/11/2018      Lab Results   Component Value Date    TSH 0.485 05/11/2018      Lab Results   Component Value Date    LDLCALC 65.0 05/11/2018      PHYSICAL EXAMINATION  Constitutional: Appears well, no distress  Neck: Supple, trachea midline.   Respiratory:  even and unlabored.  Lymph: no edema.   Skin: warm and dry; no injection site reactions, no acanthosis nigracans observed.  Neuro:patient alert and cooperative, normal affect.    Diabetes Foot Exam: as of 10/2017  Protective Sensation (w/ 10 gram monofilament):  Right: Intact  Left: Decreased    Visual Inspection:  Normal -  Bilateral    Pedal Pulses:   Right: Present  Left: Present    Posterior tibialis:   Right:Present  Left: Present     Assessment/Plan  Type 2 diabetes mellitus with stage 3 chronic kidney disease, retinopathy, neuropathy, with long-term current use of insulin with hypoglycemia Hemoglobin A1c  A1c at goal given age and comorbidities.   Reviewed A1c and BG goals.     Continue Lantus 30 units nightly. Reviewed insulin's onset, peak, duration, dosing, administration.   - will be in contact with PAP for insulin soon.     Continue Glimepiride 2 mg once daily for prandial coverage.  eRx sent. Continue Tradjenta and current dose of Metformin    - reviewed hypoglycemia, s/s and tx options.     - Discussed DM diet, limiting carbs, portion size. Less chocolate mini candy bars.     - takes ASA, statin, ARB   Diabetic polyneuropathy associated with type 2 diabetes mellitus  F/u with podiatry yearly  - not currently taking Rx  - maintain DM control   Chronic kidney disease, stage 3, mod decreased GFR  monitor GFR for metformin use - may need to d/c in future  avoid hypoglycemia  Avoid SGLT2i  - on ARB therapy   Coronary artery disease involving native coronary artery of native heart without angina pectoris ; NICM avoid hypoglycemia  F/u with cardiology   Chronic systolic heart failure  avoid hypoglycemia  F/u with cardiology   KHOA (obstructive sleep apnea)  Continue nightly c-pap use  Sleep Medicine appointment 6/2018   Essential hypertension  suboptimal reading today, no meds yet this AM, enrolled in digital HTN program; currently on several HTN agents; off nifedipine r/t side effects; on ARB therapy.      Pure hypercholesterolemia   LDL goal < 100  - controlled, LDL at goal, on moderate intensity statin with fish oil, LFTs WNL     Multinodular goiter - 4/2014 R FNA Per Dr. Emmanuel  - 6/2016 L FNA Per Dr. Emmanuel  Had repeat U/S in 5/2018 per Dr. Gaston  - reviewed case with LISSA Nick NP pending appt next month.         FOLLOW UP  Follow-up in about 6 months (around 1/16/2019).   Alternate appts with PCP Dr. Gaston

## 2018-07-16 NOTE — PROGRESS NOTES
Subjective:    Patient ID:  Myesha Quinones is a 78 y.o. female who presents for follow-up of Device check      HPI 79 yo female with h/o NICM, Htn, LBBB, CAD.   CRT-D implanted 2007 >> EF has normalized.   Has done well for extended period.   12/17/12 had an episode of VT >> ATP unsuccessful, shock resulted in Type II break. She had presyncope >> then had shock.  We elected to defer addition of anti-arrhythmic therapy, as this was first shock, EF was normal.  Generator change 3/13.  Device interrogations have revealed stable function of leads, 100% biventricular pacing, no significant arrhythmias, nearing DONNA.  Feeling well.  Denies syncope, palpitations, dizziness.  Notes dizziness which she has attributed to her asthma.      Review of Systems   Constitution: Negative. Negative for weakness and malaise/fatigue.   Cardiovascular: Positive for dyspnea on exertion. Negative for chest pain, irregular heartbeat, leg swelling, near-syncope, orthopnea, palpitations, paroxysmal nocturnal dyspnea and syncope.   Respiratory: Positive for shortness of breath. Negative for cough.    Neurological: Negative for dizziness and light-headedness.   All other systems reviewed and are negative.       Objective:    Physical Exam   Constitutional: She is oriented to person, place, and time. She appears well-developed and well-nourished.   Eyes: Conjunctivae are normal. No scleral icterus.   Neck: No JVD present. No tracheal deviation present.   Cardiovascular: Normal rate and regular rhythm.  PMI is not displaced.    Pulmonary/Chest: Effort normal and breath sounds normal. No respiratory distress.   Abdominal: Soft. There is no hepatosplenomegaly. There is no tenderness.   Musculoskeletal: She exhibits no edema or tenderness.   Neurological: She is alert and oriented to person, place, and time.   Skin: Skin is warm and dry. No rash noted.   Psychiatric: She has a normal mood and affect. Her behavior is normal.         Assessment:        1. Chronic systolic heart failure    2. Coronary artery disease involving native coronary artery of native heart without angina pectoris    3. Nonischemic cardiomyopathy    4. LBBB (left bundle branch block)    5. Essential hypertension    6. Biventricular ICD (implantable cardioverter-defibrillator) in place    7. Chronic obstructive pulmonary disease, unspecified COPD type    8. 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, with long-term current use of insulin    9. Diabetic polyneuropathy associated with type 2 diabetes mellitus    10. Chronic kidney disease, stage 3, mod decreased GFR    11. KHOA (obstructive sleep apnea)         Plan:       Doing well.  Nearing DONNA.  When she reaches DONNA, we will replace her generator (with [employer] again).

## 2018-07-16 NOTE — TELEPHONE ENCOUNTER
----- Message from Veronica Nick NP sent at 7/16/2018  2:49 PM CDT -----  Recall for US in 1 year.  Cancel appt with me in august     Thank you,   Veronica

## 2018-07-18 ENCOUNTER — CLINICAL SUPPORT (OUTPATIENT)
Dept: ELECTROPHYSIOLOGY | Facility: CLINIC | Age: 78
End: 2018-07-18
Attending: INTERNAL MEDICINE
Payer: MEDICARE

## 2018-07-18 ENCOUNTER — HOSPITAL ENCOUNTER (OUTPATIENT)
Dept: CARDIOLOGY | Facility: CLINIC | Age: 78
Discharge: HOME | End: 2018-07-18
Payer: MEDICARE

## 2018-07-18 VITALS
HEART RATE: 84 BPM | HEIGHT: 63 IN | SYSTOLIC BLOOD PRESSURE: 148 MMHG | WEIGHT: 157.19 LBS | BODY MASS INDEX: 27.85 KG/M2 | DIASTOLIC BLOOD PRESSURE: 72 MMHG

## 2018-07-18 DIAGNOSIS — J44.9 CHRONIC OBSTRUCTIVE PULMONARY DISEASE, UNSPECIFIED COPD TYPE: ICD-10-CM

## 2018-07-18 DIAGNOSIS — I25.10 CORONARY ARTERY DISEASE INVOLVING NATIVE CORONARY ARTERY OF NATIVE HEART WITHOUT ANGINA PECTORIS: ICD-10-CM

## 2018-07-18 DIAGNOSIS — I50.22 CHRONIC SYSTOLIC HEART FAILURE: ICD-10-CM

## 2018-07-18 DIAGNOSIS — I50.22 CHRONIC SYSTOLIC HEART FAILURE: Primary | ICD-10-CM

## 2018-07-18 DIAGNOSIS — Z95.810 IMPLANTABLE CARDIOVERTER-DEFIBRILLATOR (ICD) IN SITU: ICD-10-CM

## 2018-07-18 DIAGNOSIS — I44.7 LBBB (LEFT BUNDLE BRANCH BLOCK): ICD-10-CM

## 2018-07-18 DIAGNOSIS — Z79.4 CONTROLLED TYPE 2 DIABETES MELLITUS WITH BOTH EYES AFFECTED BY MILD NONPROLIFERATIVE RETINOPATHY AND MACULAR EDEMA, WITH LONG-TERM CURRENT USE OF INSULIN: ICD-10-CM

## 2018-07-18 DIAGNOSIS — I42.8 NONISCHEMIC CARDIOMYOPATHY: ICD-10-CM

## 2018-07-18 DIAGNOSIS — Z95.810 BIVENTRICULAR ICD (IMPLANTABLE CARDIOVERTER-DEFIBRILLATOR) IN PLACE: ICD-10-CM

## 2018-07-18 DIAGNOSIS — E11.42 DIABETIC POLYNEUROPATHY ASSOCIATED WITH TYPE 2 DIABETES MELLITUS: ICD-10-CM

## 2018-07-18 DIAGNOSIS — I10 ESSENTIAL HYPERTENSION: ICD-10-CM

## 2018-07-18 DIAGNOSIS — I42.8 NICM (NONISCHEMIC CARDIOMYOPATHY): ICD-10-CM

## 2018-07-18 DIAGNOSIS — G47.33 OSA (OBSTRUCTIVE SLEEP APNEA): ICD-10-CM

## 2018-07-18 DIAGNOSIS — Z95.810 ICD (IMPLANTABLE CARDIOVERTER-DEFIBRILLATOR) IN PLACE: Primary | ICD-10-CM

## 2018-07-18 DIAGNOSIS — N18.30 CHRONIC KIDNEY DISEASE, STAGE 3, MOD DECREASED GFR: ICD-10-CM

## 2018-07-18 DIAGNOSIS — E11.3213 CONTROLLED TYPE 2 DIABETES MELLITUS WITH BOTH EYES AFFECTED BY MILD NONPROLIFERATIVE RETINOPATHY AND MACULAR EDEMA, WITH LONG-TERM CURRENT USE OF INSULIN: ICD-10-CM

## 2018-07-18 PROCEDURE — 3077F SYST BP >= 140 MM HG: CPT | Mod: CPTII,S$GLB,, | Performed by: INTERNAL MEDICINE

## 2018-07-18 PROCEDURE — 93284 PRGRMG EVAL IMPLANTABLE DFB: CPT | Mod: S$GLB,,, | Performed by: INTERNAL MEDICINE

## 2018-07-18 PROCEDURE — 99999 PR PBB SHADOW E&M-EST. PATIENT-LVL III: CPT | Mod: PBBFAC,,, | Performed by: INTERNAL MEDICINE

## 2018-07-18 PROCEDURE — 3078F DIAST BP <80 MM HG: CPT | Mod: CPTII,S$GLB,, | Performed by: INTERNAL MEDICINE

## 2018-07-18 PROCEDURE — 93000 ELECTROCARDIOGRAM COMPLETE: CPT | Mod: S$GLB,,, | Performed by: INTERNAL MEDICINE

## 2018-07-18 PROCEDURE — 99499 UNLISTED E&M SERVICE: CPT | Mod: HCNC,S$GLB,, | Performed by: INTERNAL MEDICINE

## 2018-07-18 PROCEDURE — 99214 OFFICE O/P EST MOD 30 MIN: CPT | Mod: S$GLB,,, | Performed by: INTERNAL MEDICINE

## 2018-07-24 ENCOUNTER — PATIENT OUTREACH (OUTPATIENT)
Dept: OTHER | Facility: OTHER | Age: 78
End: 2018-07-24

## 2018-07-24 NOTE — PROGRESS NOTES
Last 5 Patient Entered Readings                                      Current 30 Day Average: 146/67     Recent Readings 7/23/2018 7/19/2018 7/16/2018 7/13/2018 7/12/2018    SBP (mmHg) 130 158 150 145 139    DBP (mmHg) 55 76 65 61 62    Pulse 86 75 83 77 79          Digital Medicine: Health  Follow Up    Lifestyle Modifications:    1.Dietary Modifications (Sodium intake <2,000mg/day, food labels, dining out):  Patient states she would like to maintain low sodium diet and continue with label reading as her main focus. Notes this has gone well for her and she intends to continue. Consider dietary recall at next contact to help ensure low sodium options.     2.Physical Activity: Patient continues to get most physical activity from walking around the house as her asthma and COPD make it challenging for her to be active. Continues to have shortness of breath and dyspnea on exertion.     3.Medication Therapy: Patient has been compliant with the medication regimen.    4.Patient has the following medication side effects/concerns: No side effects/concerns per patient.     Follow up with Mrs. Myesha Quinones completed. No further questions or concerns. Will continue follow up to achieve health goals.

## 2018-07-26 RX ORDER — CHLORTHALIDONE 25 MG/1
25 TABLET ORAL DAILY
Qty: 90 TABLET | Refills: 0 | Status: SHIPPED | OUTPATIENT
Start: 2018-07-26 | End: 2018-10-21 | Stop reason: SDUPTHER

## 2018-07-30 ENCOUNTER — OFFICE VISIT (OUTPATIENT)
Dept: CARDIOLOGY | Facility: CLINIC | Age: 78
End: 2018-07-30
Payer: MEDICARE

## 2018-07-30 VITALS
WEIGHT: 158.31 LBS | BODY MASS INDEX: 28.05 KG/M2 | HEIGHT: 63 IN | SYSTOLIC BLOOD PRESSURE: 160 MMHG | HEART RATE: 87 BPM | DIASTOLIC BLOOD PRESSURE: 68 MMHG

## 2018-07-30 DIAGNOSIS — I50.22 CHRONIC SYSTOLIC HEART FAILURE: ICD-10-CM

## 2018-07-30 DIAGNOSIS — E78.00 PURE HYPERCHOLESTEROLEMIA: ICD-10-CM

## 2018-07-30 DIAGNOSIS — G47.33 OSA (OBSTRUCTIVE SLEEP APNEA): ICD-10-CM

## 2018-07-30 DIAGNOSIS — J44.9 CHRONIC OBSTRUCTIVE PULMONARY DISEASE, UNSPECIFIED COPD TYPE: ICD-10-CM

## 2018-07-30 DIAGNOSIS — Z95.810 BIVENTRICULAR ICD (IMPLANTABLE CARDIOVERTER-DEFIBRILLATOR) IN PLACE: ICD-10-CM

## 2018-07-30 DIAGNOSIS — I42.8 NONISCHEMIC CARDIOMYOPATHY: Primary | ICD-10-CM

## 2018-07-30 DIAGNOSIS — I10 ESSENTIAL HYPERTENSION: ICD-10-CM

## 2018-07-30 PROCEDURE — 99999 PR PBB SHADOW E&M-EST. PATIENT-LVL III: CPT | Mod: PBBFAC,,, | Performed by: INTERNAL MEDICINE

## 2018-07-30 PROCEDURE — 99214 OFFICE O/P EST MOD 30 MIN: CPT | Mod: S$GLB,,, | Performed by: INTERNAL MEDICINE

## 2018-07-30 PROCEDURE — 3078F DIAST BP <80 MM HG: CPT | Mod: CPTII,S$GLB,, | Performed by: INTERNAL MEDICINE

## 2018-07-30 PROCEDURE — 3077F SYST BP >= 140 MM HG: CPT | Mod: CPTII,S$GLB,, | Performed by: INTERNAL MEDICINE

## 2018-07-30 RX ORDER — IRBESARTAN 300 MG/1
300 TABLET ORAL NIGHTLY
Qty: 90 TABLET | Refills: 3 | Status: ON HOLD | OUTPATIENT
Start: 2018-07-30 | End: 2018-12-17 | Stop reason: SDUPTHER

## 2018-07-30 NOTE — PROGRESS NOTES
"Subjective:   Patient ID:  Myesha Quinones is a 78 y.o. female who presents for follow-up of Nonischemic cardiomyopathy (3 months fu)      HPI:   Myesha Quinones presents for follow up of non-ischemic cardiomyopathy and hypertension. She has had CRT and EF has improved to 50-55%. Myesha Quinones has hypertension with elevation this visit but she is 140s/ per the Digital Medicine Program. Has " asthma" and has noted some wheezing and SOB. Myesha Quinones denies chest pain, palpitations, presyncope , or syncope. Myesha Quinones has dyslipidemia At LDL goal  on moderate intensity statin therapy.    Review of Systems   Constitution: Negative for weakness, malaise/fatigue, weight gain and weight loss.   Eyes: Negative for blurred vision.   Cardiovascular: Positive for dyspnea on exertion. Negative for chest pain, claudication, cyanosis, irregular heartbeat, leg swelling, near-syncope, orthopnea, palpitations, paroxysmal nocturnal dyspnea and syncope.   Respiratory: Positive for shortness of breath and wheezing. Negative for cough.         KHOA on CPAP   Musculoskeletal: Positive for joint pain. Negative for falls and myalgias.   Gastrointestinal: Positive for constipation. Negative for abdominal pain, heartburn, nausea and vomiting.   Genitourinary: Negative for nocturia.   Neurological: Negative for brief paralysis, dizziness, focal weakness, headaches, numbness and paresthesias.   Psychiatric/Behavioral: Negative for altered mental status.       Current Outpatient Prescriptions   Medication Sig    ACCU-CHEK HECTOR CONTROL SOLN Soln     ACCU-CHEK HECTOR PLUS METER Misc     albuterol 90 mcg/actuation inhaler Inhale 2 puffs into the lungs every 6 (six) hours as needed for Wheezing. Rescue    albuterol-ipratropium 2.5mg-0.5mg/3mL (DUO-NEB) 0.5 mg-3 mg(2.5 mg base)/3 mL nebulizer solution Take 3 mLs by nebulization every 4 (four) hours. Rescue    aspirin (ENTERIC COATED ASPIRIN) 81 MG EC tablet " "Take 1 tablet by mouth once daily.     benzonatate (TESSALON PERLES) 100 MG capsule Take 2 capsules (200 mg total) by mouth 3 (three) times daily as needed for Cough.    blood sugar diagnostic (ACCU-CHEK HECTOR) Strp Uses Accu-Check Hector meter to test BG 4x/day (Patient taking differently: Uses Accu-Check Hector meter to test BG 4x/day checking 2x day)    carvedilol (COREG) 25 MG tablet Take 2 tablets (50 mg total) by mouth 2 (two) times daily.    chlorthalidone (HYGROTEN) 25 MG Tab TAKE 1 TABLET (25 MG TOTAL) BY MOUTH ONCE DAILY.    cholecalciferol, vitamin D3, (VITAMIN D3) 2,000 unit Tab Take by mouth once daily.    CYANOCOBALAMIN, VITAMIN B-12, (B-12 DOTS ORAL) Take 1 tablet by mouth once daily.    fluticasone-salmeterol 250-50 mcg/dose (ADVAIR) 250-50 mcg/dose diskus inhaler Inhale 1 puff into the lungs 2 (two) times daily. This is a change from one month    glimepiride (AMARYL) 2 MG tablet TAKE 1 TABLET BY MOUTH daily    hydrALAZINE (APRESOLINE) 100 MG tablet TAKE 1 TABLET (100 MG TOTAL) BY MOUTH 3 (THREE) TIMES DAILY.    insulin glargine (LANTUS SOLOSTAR) 100 unit/mL (3 mL) InPn pen Inject 30 Units into the skin every evening.    lancets (ACCU-CHEK SOFTCLIX LANCETS) Misc Uses Accu-Chek Hector meter to test BG 4x/day    lancets 30 gauge Misc     lancing device Misc     levocetirizine (XYZAL) 5 MG tablet Take 1 tablet (5 mg total) by mouth every evening.    linagliptin (TRADJENTA) 5 mg Tab tablet Take 1 tablet (5 mg total) by mouth once daily.    magnesium oxide (MAG-OX) 400 mg tablet Take 1 tablet (400 mg total) by mouth once daily.    metFORMIN (GLUCOPHAGE) 500 MG tablet Take 2 tablets (1,000 mg total) by mouth 2 (two) times daily with meals.    nitroGLYCERIN (NITROSTAT) 0.4 MG SL tablet Place 1 tablet under the tongue continuous prn.      omega-3 fatty acids (FISH OIL) 500 mg Cap Take 1 capsule by mouth Twice daily.    pen needle, diabetic (BD INSULIN PEN NEEDLE UF MINI) 31 gauge x 3/16" " "Ndle USE WITH LANTUS AT BEDTIME    simvastatin (ZOCOR) 20 MG tablet TAKE 1 TABLET (20 MG TOTAL) BY MOUTH EVERY EVENING.    azelastine (ASTELIN) 137 mcg (0.1 %) nasal spray 1 spray (137 mcg total) by Nasal route 2 (two) times daily. For severe allergy    irbesartan (AVAPRO) 300 MG tablet Take 1 tablet (300 mg total) by mouth every evening.     No current facility-administered medications for this visit.      Objective:   Physical Exam   Constitutional: She is oriented to person, place, and time. She appears well-developed. No distress.   BP (!) 202/90 (BP Location: Left arm, Patient Position: Sitting, BP Method: X-Large (Automatic))   Pulse 87   Ht 5' 3" (1.6 m)   Wt 71.8 kg (158 lb 4.6 oz)   BMI 28.04 kg/m²    HENT:   Head: Normocephalic.   Eyes: Conjunctivae are normal. Pupils are equal, round, and reactive to light. No scleral icterus.   Neck: Neck supple. No JVD present. No thyromegaly present.   Cardiovascular: Normal rate, regular rhythm, normal heart sounds and intact distal pulses.  PMI is not displaced.  Exam reveals no gallop and no friction rub.    No murmur heard.  1+ pedal edema on the left only.   Pulmonary/Chest: Effort normal. No respiratory distress. She has wheezes. She has no rales.   Right mastectomy    Abdominal: Soft. She exhibits no distension. There is no splenomegaly or hepatomegaly. There is no tenderness.   Musculoskeletal: She exhibits no edema or tenderness.   Right arm lymphedema    Neurological: She is alert and oriented to person, place, and time.   Skin: Skin is warm and dry. She is not diaphoretic.   Psychiatric: She has a normal mood and affect. Her behavior is normal.       Lab Results   Component Value Date     05/11/2018    K 4.2 05/11/2018     05/11/2018    CO2 24 05/11/2018    BUN 25 (H) 05/11/2018    CREATININE 1.3 05/11/2018     (H) 05/11/2018    HGBA1C 7.3 (H) 07/09/2018    MG 1.8 04/08/2018    AST 18 05/11/2018    ALT 18 05/11/2018    ALBUMIN 3.3 " (L) 05/11/2018    PROT 6.7 05/11/2018    BILITOT 0.2 05/11/2018    WBC 8.38 05/11/2018    HGB 11.3 (L) 05/11/2018    HCT 35.3 (L) 05/11/2018    MCV 92 05/11/2018     05/11/2018    TSH 0.485 05/11/2018    CHOL 130 05/11/2018    HDL 47 05/11/2018    LDLCALC 65.0 05/11/2018    TRIG 90 05/11/2018       Assessment:     1. Nonischemic cardiomyopathy : EF 50-55%   2. Chronic systolic heart failure : Compensated   3. Essential hypertension : Continued mild-moderate elevatio -intolerant to Ca++ antagonist   4. Biventricular ICD (implantable cardioverter-defibrillator) in place    5. Pure hypercholesterolemia :  on moderate intensity statin therapy.   6. KHOA (obstructive sleep apnea)    7. Chronic obstructive pulmonary disease, unspecified COPD type: Mild wheezing present        Plan:     Myesha was seen today for nonischemic cardiomyopathy.    Diagnoses and all orders for this visit:    Nonischemic cardiomyopathy  -     irbesartan (AVAPRO) 300 MG tablet; Take 1 tablet (300 mg total) by mouth every evening in place of Valsartan.    Chronic systolic heart failure  Continue current regimen    Essential hypertension  -     irbesartan (AVAPRO) 300 MG tablet; Take 1 tablet (300 mg total) by mouth every evening.  Continue Digital Monitoring program  Biventricular ICD (implantable cardioverter-defibrillator) in place    Pure hypercholesterolemia  Continue current regimen    KHOA (obstructive sleep apnea)    Chronic obstructive pulmonary disease, unspecified COPD type  Suggested she contact her PCP if wheezing persists

## 2018-08-01 ENCOUNTER — TELEPHONE (OUTPATIENT)
Dept: PULMONOLOGY | Facility: CLINIC | Age: 78
End: 2018-08-01

## 2018-08-06 RX ORDER — IPRATROPIUM BROMIDE AND ALBUTEROL SULFATE 2.5; .5 MG/3ML; MG/3ML
SOLUTION RESPIRATORY (INHALATION)
Qty: 90 ML | Refills: 3 | Status: ON HOLD | OUTPATIENT
Start: 2018-08-06 | End: 2019-11-05 | Stop reason: HOSPADM

## 2018-08-07 ENCOUNTER — PATIENT OUTREACH (OUTPATIENT)
Dept: OTHER | Facility: OTHER | Age: 78
End: 2018-08-07

## 2018-08-07 DIAGNOSIS — I10 ESSENTIAL HYPERTENSION: Primary | ICD-10-CM

## 2018-08-07 RX ORDER — CLONIDINE HYDROCHLORIDE 0.1 MG/1
0.1 TABLET ORAL NIGHTLY
Qty: 30 TABLET | Refills: 3 | Status: SHIPPED | OUTPATIENT
Start: 2018-08-07 | End: 2018-12-13 | Stop reason: SDUPTHER

## 2018-08-07 NOTE — PROGRESS NOTES
Last 5 Patient Entered Readings                                      Current 30 Day Average: 153/69     Recent Readings 8/5/2018 8/4/2018 8/1/2018 7/27/2018 7/27/2018    SBP (mmHg) 152 176 148 148 143    DBP (mmHg) 71 79 66 71 94    Pulse 75 83 73 75 75        Mrs. Quinones's BP readings remain above goal; patient is approaching 80 y/o so aiming for BP of <140/90. She is unable to tolerate amlodipine due to peripheral edema; nifedipine caused weakness; spironolactone caused hyperkalemia. Will start clonidine 0.1 mg in the evening. She will be going to [place] next week, so she will start clonidine when she returns on or around 8/19-8/20.     Will continue to monitor regularly. Will follow up in 2-3 weeks, sooner if BP begins to trend upward or downward.    Patient has my contact information and knows to call with any concerns or clinical changes.     Current HTN regimen:  Hypertension Medications             carvedilol (COREG) 25 MG tablet Take 2 tablets (50 mg total) by mouth 2 (two) times daily.    chlorthalidone (HYGROTEN) 25 MG Tab TAKE 1 TABLET (25 MG TOTAL) BY MOUTH ONCE DAILY.    cloNIDine (CATAPRES) 0.1 MG tablet Take 1 tablet (0.1 mg total) by mouth every evening.    hydrALAZINE (APRESOLINE) 100 MG tablet TAKE 1 TABLET (100 MG TOTAL) BY MOUTH 3 (THREE) TIMES DAILY.    irbesartan (AVAPRO) 300 MG tablet Take 1 tablet (300 mg total) by mouth every evening.    nitroGLYCERIN (NITROSTAT) 0.4 MG SL tablet Place 1 tablet under the tongue continuous prn.

## 2018-08-09 ENCOUNTER — PROCEDURE VISIT (OUTPATIENT)
Dept: OPHTHALMOLOGY | Facility: CLINIC | Age: 78
End: 2018-08-09
Payer: MEDICARE

## 2018-08-09 VITALS — HEART RATE: 91 BPM | SYSTOLIC BLOOD PRESSURE: 170 MMHG | DIASTOLIC BLOOD PRESSURE: 74 MMHG

## 2018-08-09 DIAGNOSIS — Z79.4 CONTROLLED TYPE 2 DIABETES MELLITUS WITH BOTH EYES AFFECTED BY MILD NONPROLIFERATIVE RETINOPATHY AND MACULAR EDEMA, WITH LONG-TERM CURRENT USE OF INSULIN: Primary | ICD-10-CM

## 2018-08-09 DIAGNOSIS — E11.3213 CONTROLLED TYPE 2 DIABETES MELLITUS WITH BOTH EYES AFFECTED BY MILD NONPROLIFERATIVE RETINOPATHY AND MACULAR EDEMA, WITH LONG-TERM CURRENT USE OF INSULIN: Primary | ICD-10-CM

## 2018-08-09 DIAGNOSIS — H35.033 HYPERTENSIVE RETINOPATHY, BILATERAL: ICD-10-CM

## 2018-08-09 PROCEDURE — 92134 CPTRZ OPH DX IMG PST SGM RTA: CPT | Mod: S$GLB,,, | Performed by: OPHTHALMOLOGY

## 2018-08-09 PROCEDURE — 92014 COMPRE OPH EXAM EST PT 1/>: CPT | Mod: 25,S$GLB,, | Performed by: OPHTHALMOLOGY

## 2018-08-09 PROCEDURE — 67028 INJECTION EYE DRUG: CPT | Mod: LT,S$GLB,, | Performed by: OPHTHALMOLOGY

## 2018-08-09 NOTE — PROGRESS NOTES
"HPI     1 mo /OCT/avastin/possible ozurdex if approved  DLS- 06/28/2018 Dr. Arechiga     Pt sts no change since last visit denies pain   (-)Flashes (+)Floaters improvement   (-)Photophobia  (-)Glare      No gtts       OCT - OD No ME  OS - central - improving again    Prior FA - Foveal MA's OS   No NV of NP OU      A/P    1. 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 DME OS  S/p Avastin OS x 5  Foveal MA"s OS - may need chronic pharmacotherapy    Ozurdex OS    3. HTN Ret OU  BS/BP/chol control    4. PCIOL OU  With small PCO      2 month OCT    Risks, benefits, and alternatives to treatment discussed in detail with the patient.  The patient voiced understanding and wished to proceed with the procedure    Injection Procedure Note:  Diagnosis: DME OS    Topical Proparacaine and Betadine. Conj Prep only, subconj lido  Inject Ozurdex OS at 6:00 @ 3.5-4mm posterior to limbus  Post Operative Dx: Same  Complications: None  Follow up as above.    "

## 2018-08-09 NOTE — PATIENT INSTRUCTIONS

## 2018-08-12 ENCOUNTER — TELEPHONE (OUTPATIENT)
Dept: INTERNAL MEDICINE | Facility: CLINIC | Age: 78
End: 2018-08-12

## 2018-08-12 RX ORDER — FLUTICASONE PROPIONATE AND SALMETEROL 250; 50 UG/1; UG/1
1 POWDER RESPIRATORY (INHALATION) 2 TIMES DAILY
Qty: 180 EACH | Refills: 3 | Status: SHIPPED | OUTPATIENT
Start: 2018-08-12 | End: 2019-06-27

## 2018-08-13 NOTE — TELEPHONE ENCOUNTER
Prescriptions written and forms filled out for the patient assistance program.    Please take this to Ms. Curtis.

## 2018-08-20 ENCOUNTER — TELEPHONE (OUTPATIENT)
Dept: PULMONOLOGY | Facility: CLINIC | Age: 78
End: 2018-08-20

## 2018-08-20 ENCOUNTER — CLINICAL SUPPORT (OUTPATIENT)
Dept: ELECTROPHYSIOLOGY | Facility: CLINIC | Age: 78
End: 2018-08-20
Attending: INTERNAL MEDICINE
Payer: MEDICARE

## 2018-08-20 DIAGNOSIS — I42.8 NICM (NONISCHEMIC CARDIOMYOPATHY): ICD-10-CM

## 2018-08-20 DIAGNOSIS — Z95.810 ICD (IMPLANTABLE CARDIOVERTER-DEFIBRILLATOR) IN PLACE: ICD-10-CM

## 2018-08-20 PROCEDURE — 93296 REM INTERROG EVL PM/IDS: CPT | Mod: PBBFAC | Performed by: INTERNAL MEDICINE

## 2018-08-20 PROCEDURE — 93295 DEV INTERROG REMOTE 1/2/MLT: CPT | Mod: ,,, | Performed by: INTERNAL MEDICINE

## 2018-08-21 ENCOUNTER — TELEPHONE (OUTPATIENT)
Dept: INTERNAL MEDICINE | Facility: CLINIC | Age: 78
End: 2018-08-21

## 2018-08-21 ENCOUNTER — PATIENT MESSAGE (OUTPATIENT)
Dept: INTERNAL MEDICINE | Facility: CLINIC | Age: 78
End: 2018-08-21

## 2018-08-21 ENCOUNTER — LAB VISIT (OUTPATIENT)
Dept: LAB | Facility: HOSPITAL | Age: 78
End: 2018-08-21
Attending: INTERNAL MEDICINE
Payer: MEDICARE

## 2018-08-21 ENCOUNTER — OFFICE VISIT (OUTPATIENT)
Dept: INTERNAL MEDICINE | Facility: CLINIC | Age: 78
End: 2018-08-21
Payer: MEDICARE

## 2018-08-21 VITALS
HEART RATE: 70 BPM | DIASTOLIC BLOOD PRESSURE: 78 MMHG | OXYGEN SATURATION: 96 % | WEIGHT: 156.94 LBS | SYSTOLIC BLOOD PRESSURE: 138 MMHG | HEIGHT: 63 IN | BODY MASS INDEX: 27.81 KG/M2

## 2018-08-21 DIAGNOSIS — I10 ESSENTIAL HYPERTENSION: ICD-10-CM

## 2018-08-21 DIAGNOSIS — Z79.4 CONTROLLED TYPE 2 DIABETES MELLITUS WITH BOTH EYES AFFECTED BY MILD NONPROLIFERATIVE RETINOPATHY AND MACULAR EDEMA, WITH LONG-TERM CURRENT USE OF INSULIN: ICD-10-CM

## 2018-08-21 DIAGNOSIS — J40 BRONCHITIS: ICD-10-CM

## 2018-08-21 DIAGNOSIS — N18.30 CHRONIC KIDNEY DISEASE, STAGE 3, MOD DECREASED GFR: ICD-10-CM

## 2018-08-21 DIAGNOSIS — E78.00 PURE HYPERCHOLESTEROLEMIA: ICD-10-CM

## 2018-08-21 DIAGNOSIS — Z00.00 PHYSICAL EXAM: Primary | ICD-10-CM

## 2018-08-21 DIAGNOSIS — I25.10 CORONARY ARTERY DISEASE INVOLVING NATIVE CORONARY ARTERY OF NATIVE HEART WITHOUT ANGINA PECTORIS: ICD-10-CM

## 2018-08-21 DIAGNOSIS — E11.3213 CONTROLLED TYPE 2 DIABETES MELLITUS WITH BOTH EYES AFFECTED BY MILD NONPROLIFERATIVE RETINOPATHY AND MACULAR EDEMA, WITH LONG-TERM CURRENT USE OF INSULIN: ICD-10-CM

## 2018-08-21 DIAGNOSIS — G47.33 OSA (OBSTRUCTIVE SLEEP APNEA): ICD-10-CM

## 2018-08-21 DIAGNOSIS — R05.9 COUGH: ICD-10-CM

## 2018-08-21 DIAGNOSIS — J45.20 MILD INTERMITTENT CHRONIC ASTHMA WITHOUT COMPLICATION: ICD-10-CM

## 2018-08-21 LAB
ALBUMIN SERPL BCP-MCNC: 3.7 G/DL
ALP SERPL-CCNC: 63 U/L
ALT SERPL W/O P-5'-P-CCNC: 23 U/L
ANION GAP SERPL CALC-SCNC: 11 MMOL/L
AST SERPL-CCNC: 22 U/L
BASOPHILS # BLD AUTO: 0.05 K/UL
BASOPHILS NFR BLD: 0.5 %
BILIRUB SERPL-MCNC: 0.4 MG/DL
BUN SERPL-MCNC: 28 MG/DL
CALCIUM SERPL-MCNC: 10.1 MG/DL
CHLORIDE SERPL-SCNC: 104 MMOL/L
CHOLEST SERPL-MCNC: 116 MG/DL
CHOLEST/HDLC SERPL: 2.7 {RATIO}
CO2 SERPL-SCNC: 25 MMOL/L
CREAT SERPL-MCNC: 1.2 MG/DL
DIFFERENTIAL METHOD: ABNORMAL
EOSINOPHIL # BLD AUTO: 0.7 K/UL
EOSINOPHIL NFR BLD: 6.6 %
ERYTHROCYTE [DISTWIDTH] IN BLOOD BY AUTOMATED COUNT: 13.7 %
EST. GFR  (AFRICAN AMERICAN): 50 ML/MIN/1.73 M^2
EST. GFR  (NON AFRICAN AMERICAN): 43 ML/MIN/1.73 M^2
ESTIMATED AVG GLUCOSE: 148 MG/DL
GLUCOSE SERPL-MCNC: 85 MG/DL
HBA1C MFR BLD HPLC: 6.8 %
HCT VFR BLD AUTO: 37.1 %
HDLC SERPL-MCNC: 43 MG/DL
HDLC SERPL: 37.1 %
HGB BLD-MCNC: 11.8 G/DL
LDLC SERPL CALC-MCNC: 47.2 MG/DL
LYMPHOCYTES # BLD AUTO: 1.6 K/UL
LYMPHOCYTES NFR BLD: 16 %
MCH RBC QN AUTO: 29.2 PG
MCHC RBC AUTO-ENTMCNC: 31.8 G/DL
MCV RBC AUTO: 92 FL
MONOCYTES # BLD AUTO: 0.7 K/UL
MONOCYTES NFR BLD: 7 %
NEUTROPHILS # BLD AUTO: 7.1 K/UL
NEUTROPHILS NFR BLD: 69.3 %
NONHDLC SERPL-MCNC: 73 MG/DL
PLATELET # BLD AUTO: 246 K/UL
PMV BLD AUTO: 11.1 FL
POTASSIUM SERPL-SCNC: 4.6 MMOL/L
PROT SERPL-MCNC: 7.3 G/DL
RBC # BLD AUTO: 4.04 M/UL
SODIUM SERPL-SCNC: 140 MMOL/L
TRIGL SERPL-MCNC: 129 MG/DL
WBC # BLD AUTO: 10.24 K/UL

## 2018-08-21 PROCEDURE — 80053 COMPREHEN METABOLIC PANEL: CPT

## 2018-08-21 PROCEDURE — 99999 PR PBB SHADOW E&M-EST. PATIENT-LVL III: CPT | Mod: PBBFAC,,, | Performed by: INTERNAL MEDICINE

## 2018-08-21 PROCEDURE — 83036 HEMOGLOBIN GLYCOSYLATED A1C: CPT

## 2018-08-21 PROCEDURE — 85025 COMPLETE CBC W/AUTO DIFF WBC: CPT

## 2018-08-21 PROCEDURE — 99397 PER PM REEVAL EST PAT 65+ YR: CPT | Mod: S$GLB,,, | Performed by: INTERNAL MEDICINE

## 2018-08-21 PROCEDURE — 80061 LIPID PANEL: CPT

## 2018-08-21 PROCEDURE — 3075F SYST BP GE 130 - 139MM HG: CPT | Mod: CPTII,S$GLB,, | Performed by: INTERNAL MEDICINE

## 2018-08-21 PROCEDURE — 3078F DIAST BP <80 MM HG: CPT | Mod: CPTII,S$GLB,, | Performed by: INTERNAL MEDICINE

## 2018-08-21 PROCEDURE — 36415 COLL VENOUS BLD VENIPUNCTURE: CPT

## 2018-08-21 RX ORDER — BENZONATATE 100 MG/1
200 CAPSULE ORAL 3 TIMES DAILY PRN
Qty: 20 CAPSULE | Refills: 3 | Status: SHIPPED | OUTPATIENT
Start: 2018-08-21 | End: 2019-03-12 | Stop reason: SDUPTHER

## 2018-08-21 NOTE — TELEPHONE ENCOUNTER
----- Message from Veronica Nick NP sent at 8/21/2018  8:47 AM CDT -----  Contact: Emelina Gaston,    I was in the clinic the day that she saw Olivia and I examined her thyroid and Reviewed her ultrasound with Dr. Clemente. Nodules that meet criteria for FNA were both biopsied in 2014 and 2016. Both had benign pathology. No need to biopsy at this time. We decided to Repeat US in 1 year and I placed the order with a recall in 1 year. I reached out to the patient as well.    Sorry for the confusion.    Thank you,   Veronica     ----- Message -----  From: Emelina Gaston MD  Sent: 8/21/2018   7:51 AM  To: TRICIA Feliz,ANP-C, Veronica Nick NP    Good  Morning,      This patient was supposed to have an appt in Endo on Thurs. I can see it in past orders but not in current orders. Was it canceled by mistake. Olivia I think when Ms. Quinones saw you that you were able to confirm the appt.    Thank you,  Dr. Gaston

## 2018-08-21 NOTE — PROGRESS NOTES
Subjective:      Patient ID: Myesha Quinones is a 78 y.o. female.    Chief Complaint: Annual Exam    HPI:  HPI       Patient comes in today for her annual exam    Patient's blood pressure has been elevated and Dr. Fernando had added clonidine     Patient is in the Diabetes program.     Annual exam: 8/21/2018  Colonoscopy 7/2011 with colon polyp, diverticulosis and follow up in 3 years done 8/27/2014 ( follow up in 5 years)  Mammogram: 4/11/2018 Dr. Jaime  Gyn: hysterectomy and ovaries  Optho: Dr. Arechiga  Flu due in the fall  Tetanus: 4/2/2009  Shingles: 4/2/2009  Shingrix discussed  Pneumovax 6/5/2013  Prevnar 13: done     Pacemaker: :checked every 3 months  Thyroid     Reviewed diabetic screening up to date     Consultants:   Dr. Nguyen : skin cancer screen 12/2017 every 12 months  Pulmonary: consultant  Neurosurgery: has meningioma: no follow up recommmended  Urology: left renal tumor ( [place]) no follow up  Endocrine:every 6 months around 1/2019  Cardiology: Dr. Fernando every 6 months  Sleep Apnea: recently rechecked, patient uses equipment                Patient Active Problem List   Diagnosis    History of nonmelanoma skin cancer    Nonischemic cardiomyopathy    LBBB (left bundle branch block)    Chronic systolic heart failure    Nontoxic multinodular goiter    Meningioma    Asthma, chronic    Biventricular ICD (implantable cardioverter-defibrillator) in place    Tremor    Coronary artery disease involving native coronary artery without angina pectoris - Non-obstructive 50% LAD    Essential hypertension    Hyperlipidemia    History of breast cancer    Adrenal cortical nodule: repeat CT 6/2016    Calcification of aorta    Diabetic polyneuropathy associated with type 2 diabetes mellitus    Postinflammatory pulmonary fibrosis    Osteoporosis    KHOA (obstructive sleep apnea)    Type 2 diabetes mellitus with stage 3 chronic kidney disease, with long-term current use of insulin    Chronic  kidney disease, stage 3, mod decreased GFR    Iron deficiency anemia    Chemotherapy-induced neuropathy    Hypomagnesemia    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, with long-term current use of insulin    Hypertensive retinopathy, bilateral    Multiple lung nodules    COPD (chronic obstructive pulmonary disease)    Mixed conductive and sensorineural hearing loss    Cerebral meningioma     Past Medical History:   Diagnosis Date    Allergy     Asthma     Basal cell carcinoma     left forehead    Basal cell carcinoma     left nose    Basal cell carcinoma 05/20/2015    right nose    Basal cell carcinoma 12/22/2015    left lower post neck    Breast cancer     CAD (coronary artery disease)     Cardiomyopathy     Cardiomyopathy, ischemic     Cataract     CHF (congestive heart failure)     Chronic kidney disease, stage 3, mod decreased GFR 2/14/2017    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, with long-term current use of insulin 2/22/2018    COPD (chronic obstructive pulmonary disease)     COPD exacerbation 4/8/2018    Defibrillator discharge     Diabetes mellitus     Diabetes mellitus type II     Diabetes with neurologic complications     Goiter     MNG    HX: breast cancer     Hyperlipidemia     Hypertension     Iron deficiency anemia 5/16/2017    Left kidney mass     Meningioma     Osteoporosis, postmenopausal     Pacemaker     Skin cancer     s/p excision    Sleep apnea     CPAP    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/2015    mid forehead    Unspecified vitamin D deficiency     Ventricular tachycardia     Vitamin B12 deficiency     Vitamin D deficiency disease      Past Surgical History:   Procedure Laterality Date    BASAL CELL CARCINOMA EXCISION      posterior neck and nose    BREAST BIOPSY      BREAST CYST EXCISION Left      "BREAST SURGERY      CARDIAC DEFIBRILLATOR PLACEMENT      x 2    CATARACT EXTRACTION W/  INTRAOCULAR LENS IMPLANT Bilateral     CHOLECYSTECTOMY      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    FRACTURE SURGERY      left elbow and wrist as a child    HYSTERECTOMY      MASTECTOMY Right     SQUAMOUS CELL CARCINOMA EXCISION      remved from forehead    TONSILLECTOMY       Family History   Problem Relation Age of Onset    Diabetes Father     Heart disease Father     Diabetes Sister     Heart disease Sister     Diabetes Brother     Heart disease Brother     Hypertension Brother     Diabetes Brother     Heart disease Brother     Hypertension Brother     Diabetes Brother     Heart disease Brother     Cancer Brother         colon    Diabetes Brother     Cancer Son         skin    Diabetes Son         prediabetes    Diabetes Daughter         prediabetes    Cancer Daughter         melanoma    Obesity Daughter     Melanoma Daughter     Diabetes Son     Asthma Mother     Hypertension Mother     Stroke Mother     No Known Problems Maternal Grandmother     No Known Problems Maternal Grandfather     No Known Problems Paternal Grandmother     No Known Problems Paternal Grandfather     Amblyopia Neg Hx     Blindness Neg Hx     Cataracts Neg Hx     Glaucoma Neg Hx     Macular degeneration Neg Hx     Retinal detachment Neg Hx     Strabismus Neg Hx     Thyroid disease Neg Hx      Review of Systems  Objective:     Vitals:    08/21/18 0702   BP: 138/78   Pulse: 70   SpO2: 96%   Weight: 71.2 kg (156 lb 15.5 oz)   Height: 5' 3" (1.6 m)   PainSc: 0-No pain     Body mass index is 27.81 kg/m².  Physical Exam  Assessment:     1. Physical exam    2. Mild intermittent chronic asthma without complication    3. Chronic kidney disease, stage 3, mod decreased GFR    4. 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, with long-term " current use of insulin    5. Coronary artery disease involving native coronary artery of native heart without angina pectoris    6. Essential hypertension    7. Pure hypercholesterolemia    8. KHOA (obstructive sleep apnea)      Plan:     Myesha was seen today for annual exam.    Diagnoses and all orders for this visit:    Physical exam    Mild intermittent chronic asthma without complication    Chronic kidney disease, stage 3, mod decreased GFR    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, with long-term current use of insulin    Coronary artery disease involving native coronary artery of native heart without angina pectoris    Essential hypertension    Pure hypercholesterolemia    KHOA (obstructive sleep apnea)      No Follow-up on file.     Medication List           Accurate as of 8/21/18  7:41 AM. If you have any questions, ask your nurse or doctor.               CHANGE how you take these medications    blood sugar diagnostic Strp  Commonly known as:  ACCU-CHEK HECTOR  Uses Accu-Check Hector meter to test BG 4x/day  What changed:  additional instructions        CONTINUE taking these medications    ACCU-CHEK HECTOR CONTROL SOLN Soln  Generic drug:  blood glucose control high,low     ACCU-CHEK HECTOR PLUS METER Misc  Generic drug:  blood-glucose meter     albuterol 90 mcg/actuation inhaler  Inhale 2 puffs into the lungs every 6 (six) hours as needed for Wheezing. Rescue     albuterol-ipratropium 2.5 mg-0.5 mg/3 mL nebulizer solution  Commonly known as:  DUO-NEB  TAKE 1 VIAL BY NEBULIZATION EVERY 4 (FOUR) HOURS. RESCUE     azelastine 137 mcg (0.1 %) nasal spray  Commonly known as:  ASTELIN  1 spray (137 mcg total) by Nasal route 2 (two) times daily. For severe allergy     B-12 DOTS ORAL     benzonatate 100 MG capsule  Commonly known as:  TESSALON PERLES  Take 2 capsules (200 mg total) by mouth 3 (three) times daily as needed for Cough.    "  carvedilol 25 MG tablet  Commonly known as:  COREG  Take 2 tablets (50 mg total) by mouth 2 (two) times daily.     chlorthalidone 25 MG Tab  Commonly known as:  HYGROTEN  TAKE 1 TABLET (25 MG TOTAL) BY MOUTH ONCE DAILY.     cloNIDine 0.1 MG tablet  Commonly known as:  CATAPRES  Take 1 tablet (0.1 mg total) by mouth every evening.     ENTERIC COATED ASPIRIN 81 MG EC tablet  Generic drug:  aspirin     FISH  mg Cap  Generic drug:  omega-3 fatty acids     fluticasone-salmeterol 250-50 mcg/dose 250-50 mcg/dose diskus inhaler  Commonly known as:  ADVAIR  Inhale 1 puff into the lungs 2 (two) times daily. This is a change from one month     glimepiride 2 MG tablet  Commonly known as:  AMARYL  TAKE 1 TABLET BY MOUTH daily     hydrALAZINE 100 MG tablet  Commonly known as:  APRESOLINE  TAKE 1 TABLET (100 MG TOTAL) BY MOUTH 3 (THREE) TIMES DAILY.     insulin glargine 100 unit/mL (3 mL) Inpn pen  Commonly known as:  LANTUS SOLOSTAR U-100 INSULIN  Inject 30 Units into the skin every evening.     irbesartan 300 MG tablet  Commonly known as:  AVAPRO  Take 1 tablet (300 mg total) by mouth every evening.     * lancets Misc  Commonly known as:  ACCU-CHEK SOFTCLIX LANCETS  Uses Accu-Chek Angelica meter to test BG 4x/day     * lancets 30 gauge Misc     lancing device Misc     levocetirizine 5 MG tablet  Commonly known as:  XYZAL  Take 1 tablet (5 mg total) by mouth every evening.     linagliptin 5 mg Tab tablet  Commonly known as:  TRADJENTA  Take 1 tablet (5 mg total) by mouth once daily.     magnesium oxide 400 mg tablet  Commonly known as:  MAG-OX  Take 1 tablet (400 mg total) by mouth once daily.     metFORMIN 500 MG tablet  Commonly known as:  GLUCOPHAGE  Take 2 tablets (1,000 mg total) by mouth 2 (two) times daily with meals.     nitroGLYCERIN 0.4 MG SL tablet  Commonly known as:  NITROSTAT     pen needle, diabetic 31 gauge x 3/16" Ndle  Commonly known as:  BD ULTRA-FINE MINI PEN NEEDLE  USE WITH LANTUS AT BEDTIME   "   simvastatin 20 MG tablet  Commonly known as:  ZOCOR  TAKE 1 TABLET (20 MG TOTAL) BY MOUTH EVERY EVENING.     VITAMIN D3 2,000 unit Tab  Generic drug:  cholecalciferol (vitamin D3)         * This list has 2 medication(s) that are the same as other medications prescribed for you. Read the directions carefully, and ask your doctor or other care provider to review them with you.

## 2018-08-28 ENCOUNTER — PATIENT OUTREACH (OUTPATIENT)
Dept: OTHER | Facility: OTHER | Age: 78
End: 2018-08-28

## 2018-08-28 NOTE — PROGRESS NOTES
Last 5 Patient Entered Readings                                      Current 30 Day Average: 151/69     Recent Readings 8/27/2018 8/26/2018 8/25/2018 8/24/2018 8/22/2018    SBP (mmHg) 137 134 142 153 158    DBP (mmHg) 63 58 64 72 65    Pulse 79 79 78 80 84          Busy Signal. Will try again next week. BP average is not at goal. FU with patient regarding continued low sodium.

## 2018-08-31 ENCOUNTER — PATIENT OUTREACH (OUTPATIENT)
Dept: OTHER | Facility: OTHER | Age: 78
End: 2018-08-31

## 2018-08-31 NOTE — PROGRESS NOTES
Last 5 Patient Entered Readings                                      Current 30 Day Average: 151/69     Recent Readings 8/29/2018 8/28/2018 8/27/2018 8/26/2018 8/25/2018    SBP (mmHg) 154 150 137 134 142    DBP (mmHg) 67 72 63 58 64    Pulse 76 77 79 79 78        Mrs. Quinones has started clonidine 0.1 mg QD. She takes it around 7pm in the evening. She does feel it makes her drowsy, but states it doesn't bother her because its late in the evening. She has no other side effects since starting clonidine. Her BP average has not changed significantly since starting it. Will monitor before changing the dose.     Will continue to monitor regularly. Will follow up in 2-3 weeks, sooner if BP begins to trend upward or downward.    Patient has my contact information and knows to call with any concerns or clinical changes.     Current HTN regimen:  Hypertension Medications             carvedilol (COREG) 25 MG tablet Take 2 tablets (50 mg total) by mouth 2 (two) times daily.    chlorthalidone (HYGROTEN) 25 MG Tab TAKE 1 TABLET (25 MG TOTAL) BY MOUTH ONCE DAILY.    cloNIDine (CATAPRES) 0.1 MG tablet Take 1 tablet (0.1 mg total) by mouth every evening.    hydrALAZINE (APRESOLINE) 100 MG tablet TAKE 1 TABLET (100 MG TOTAL) BY MOUTH 3 (THREE) TIMES DAILY.    irbesartan (AVAPRO) 300 MG tablet Take 1 tablet (300 mg total) by mouth every evening.    nitroGLYCERIN (NITROSTAT) 0.4 MG SL tablet Place 1 tablet under the tongue continuous prn.

## 2018-09-05 NOTE — PROGRESS NOTES
Last 5 Patient Entered Readings                                      Current 30 Day Average: 150/69     Recent Readings 9/5/2018 9/4/2018 9/4/2018 9/2/2018 8/29/2018    SBP (mmHg) 140 155 153 150 154    DBP (mmHg) 64 68 92 79 67    Pulse 71 81 84 77 76          Call was disconnected. Patient states she is doing well. Will try again tomorrow as I made 2 attempts today.

## 2018-09-06 NOTE — PROGRESS NOTES
Last 5 Patient Entered Readings                                      Current 30 Day Average: 150/69     Recent Readings 9/6/2018 9/5/2018 9/4/2018 9/4/2018 9/2/2018    SBP (mmHg) 153 140 155 153 150    DBP (mmHg) 68 64 68 92 79    Pulse 71 71 81 84 77          Tried again today. Busy signal. Will attempt outreach again next week.

## 2018-09-11 NOTE — PROGRESS NOTES
Last 5 Patient Entered Readings                                      Current 30 Day Average: 150/69     Recent Readings 9/10/2018 9/6/2018 9/5/2018 9/4/2018 9/4/2018    SBP (mmHg) 153 153 140 155 153    DBP (mmHg) 68 68 64 68 92    Pulse 75 71 71 81 84            Digital Medicine: Health  Follow Up    Left voicemail to follow up with Mrs. Myesha Quinones.  Current BP average 150/69 mmHg is not at goal, 140/90 mmHg. FU on breathing, sodium monitoring and medication adherence.

## 2018-09-14 NOTE — PROGRESS NOTES
Subjective:      Patient ID: Myesha Quinones is a 77 y.o. female.    Chief Complaint: Follow-up    HPI:  HPI   Patient has joined the digital hypertension program : her blood pressure in the office on [facility] 175/81.  Dr. Fernando has added aldactone 25 mg now at twice a day.    Patient has also felt a discomfort on the left side of the head. She describes it as a funny pain or a light discomfort. She is known to have a meningioma.    Discussed anxiety with the patient and how this may effect the blood pressure.  Patient Active Problem List   Diagnosis    History of nonmelanoma skin cancer    Nonischemic cardiomyopathy    LBBB (left bundle branch block)    Chronic systolic heart failure    Nontoxic multinodular goiter    Meningioma    Asthma, chronic    Biventricular ICD (implantable cardioverter-defibrillator) in place    Coronary artery disease involving native coronary artery without angina pectoris - Non-obstructive 50% LAD    Essential hypertension    Hyperlipidemia    History of breast cancer    Adrenal cortical nodule: repeat CT 6/2016    Calcification of aorta    Diabetic polyneuropathy associated with type 2 diabetes mellitus    Osteoporosis    KHOA (obstructive sleep apnea)    Type 2 diabetes mellitus with stage 3 chronic kidney disease, with long-term current use of insulin    Chronic kidney disease, stage 3, mod decreased GFR    Iron deficiency anemia    Thyroid nodule    Chemotherapy-induced neuropathy    Hypomagnesemia     Past Medical History:   Diagnosis Date    Allergy     Asthma     Basal cell carcinoma     left forehead    Basal cell carcinoma     left nose    Basal cell carcinoma 05/20/2015    right nose    Basal cell carcinoma 12/22/2015    left lower post neck    CAD (coronary artery disease)     Cardiomyopathy     Cardiomyopathy, ischemic     Cataract     CHF (congestive heart failure)     Chronic kidney disease, stage 3, mod decreased GFR 2/14/2017    COPD  (chronic obstructive pulmonary disease)     Defibrillator discharge     Diabetes mellitus     Diabetes mellitus type II     Diabetes with neurologic complications     Goiter     MNG    HX: breast cancer     Hyperlipidemia     Hypertension     Iron deficiency anemia 5/16/2017    Left kidney mass     Meningioma     Osteoporosis, postmenopausal     Pacemaker     Skin cancer     s/p excision    Sleep apnea     CPAP    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/2015    mid forehead    Unspecified vitamin D deficiency     Ventricular tachycardia     Vitamin B12 deficiency     Vitamin D deficiency disease      Past Surgical History:   Procedure Laterality Date    BASAL CELL CARCINOMA EXCISION      posterior neck and nose    BREAST SURGERY      CARDIAC DEFIBRILLATOR PLACEMENT      x 2    CATARACT EXTRACTION W/  INTRAOCULAR LENS IMPLANT Bilateral     CHOLECYSTECTOMY      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    FRACTURE SURGERY      left elbow and wrist as a child    HYSTERECTOMY      MASTECTOMY      right    SQUAMOUS CELL CARCINOMA EXCISION      remved from forehead    TONSILLECTOMY       Family History   Problem Relation Age of Onset    Diabetes Father     Heart disease Father     Diabetes Sister     Heart disease Sister     Diabetes Brother     Heart disease Brother     Hypertension Brother     Diabetes Brother     Heart disease Brother     Hypertension Brother     Diabetes Brother     Heart disease Brother     Cancer Brother      colon    Diabetes Brother     Cancer Son      skin    Diabetes Son      prediabetes    Diabetes Daughter      prediabetes    Cancer Daughter      melanoma    Obesity Daughter     Diabetes Son     Asthma Mother     Hypertension Mother     Stroke Mother     No Known Problems Maternal Grandmother     No Known Problems Maternal Grandfather     No Known Problems Paternal Grandmother     No Known Problems Paternal Grandfather     Amblyopia Neg Hx   "   Blindness Neg Hx     Cataracts Neg Hx     Glaucoma Neg Hx     Macular degeneration Neg Hx     Retinal detachment Neg Hx     Strabismus Neg Hx     Thyroid disease Neg Hx      Review of Systems   Constitutional: Negative for chills, fever and unexpected weight change.   HENT: Negative for trouble swallowing.    Respiratory: Negative for cough, shortness of breath and wheezing.    Cardiovascular: Negative for chest pain and palpitations.   Gastrointestinal: Negative for abdominal distention, abdominal pain, blood in stool and vomiting.   Musculoskeletal: Negative for back pain.     Objective:     Vitals:    11/16/17 0705   BP: 128/64   Pulse: 78   SpO2: 97%   Weight: 72.8 kg (160 lb 7.9 oz)   Height: 5' 3" (1.6 m)   PainSc:   3   PainLoc: Head     Body mass index is 28.43 kg/m².  Physical Exam   Constitutional: She is oriented to person, place, and time. She appears well-developed and well-nourished. No distress.   Neck: Carotid bruit is not present. No thyromegaly present.   Cardiovascular: Normal rate, regular rhythm and normal heart sounds.  PMI is not displaced.    Pulmonary/Chest: Effort normal and breath sounds normal. No respiratory distress.   Abdominal: Soft. Bowel sounds are normal. She exhibits no distension. There is no tenderness.   Musculoskeletal: She exhibits no edema.   Neurological: She is alert and oriented to person, place, and time.     Assessment:     1. Essential hypertension    2. Other headache syndrome    3. Meningioma    4. Chronic kidney disease, stage 3, mod decreased GFR    5. Pure hypercholesterolemia    6. Type 2 diabetes mellitus with stage 3 chronic kidney disease, with long-term current use of insulin    7. Anxiety      Plan:   Myesha was seen today for follow-up.    Diagnoses and all orders for this visit:    Essential hypertension: definitely influenced by anxiety    Other headache syndrome  -     CT Head Without Contrast; Future    Meningioma  -     CT Head Without " Contrast; Future    Chronic kidney disease, stage 3, mod decreased GFR    Pure hypercholesterolemia    Type 2 diabetes mellitus with stage 3 chronic kidney disease, with long-term current use of insulin  -     Hemoglobin A1c; Future    Anxiety: sertraline started 25 mg    Other orders  -     sertraline (ZOLOFT) 25 MG tablet; Take 1 tablet (25 mg total) by mouth every evening.      Return in about 3 weeks (around 12/7/2017) for Follow up.     Medication List          Accurate as of 11/16/17  8:03 AM. If you have any questions, ask your nurse or doctor.               START taking these medications    sertraline 25 MG tablet  Commonly known as:  ZOLOFT  Take 1 tablet (25 mg total) by mouth every evening.  Started by:  Emelina Gaston MD        CHANGE how you take these medications    blood sugar diagnostic Strp  Commonly known as:  ACCU-CHEK HECTOR  Uses Accu-Check Hector meter to test BG 4x/day  What changed:  additional instructions     magnesium oxide 400 mg tablet  Commonly known as:  MAG-OX  Take 1 tablet (400 mg total) by mouth 2 (two) times daily.  What changed:  additional instructions        CONTINUE taking these medications    albuterol 2.5 mg /3 mL (0.083 %) nebulizer solution  Commonly known as:  PROVENTIL  Take 3 mLs (2.5 mg total) by nebulization every 6 (six) hours as needed for Wheezing.     azelastine 137 mcg (0.1 %) nasal spray  Commonly known as:  ASTELIN  1 spray (137 mcg total) by Nasal route 2 (two) times daily. For severe allergy     B-12 DOTS ORAL     blood-glucose meter kit  Dispense Accu-Chek Hector meter. Use as instructed     carvedilol 25 MG tablet  Commonly known as:  COREG  TAKE 2 TABLETS BY MOUTH TWICE A DAY     chlorthalidone 25 MG Tab  Commonly known as:  HYGROTEN  Take 1 tablet (25 mg total) by mouth once daily.     ENTERIC COATED ASPIRIN 81 MG EC tablet  Generic drug:  aspirin     FISH  mg Cap  Generic drug:  omega-3 fatty acids     fluticasone-salmeterol 250-50 mcg/dose 250-50  "mcg/dose diskus inhaler  Commonly known as:  ADVAIR  Inhale 1 puff into the lungs 2 (two) times daily. This is a change from one month     glimepiride 2 MG tablet  Commonly known as:  AMARYL  TAKE 1 TABLET BY MOUTH daily     hydrALAZINE 100 MG tablet  Commonly known as:  APRESOLINE  Take 1 tablet (100 mg total) by mouth 3 (three) times daily.     insulin glargine 100 unit/mL (3 mL) Inpn pen  Commonly known as:  LANTUS SOLOSTAR  INJECT 40 UNITS INTO THE SKIN EVERY EVENING.     lancets Misc  Commonly known as:  ACCU-CHEK SOFTCLIX LANCETS  Uses Accu-Chek Angelica meter to test BG 4x/day     linagliptin 5 mg Tab tablet  Commonly known as:  TRADJENTA  Take 1 tablet (5 mg total) by mouth once daily.     metFORMIN 500 MG tablet  Commonly known as:  GLUCOPHAGE  Take 2 tablets (1,000 mg total) by mouth 2 (two) times daily with meals.     nitroGLYCERIN 0.4 MG SL tablet  Commonly known as:  NITROSTAT     * pen needle, diabetic 31 gauge x 3/16" Ndle  Commonly known as:  BD INSULIN PEN NEEDLE UF MINI  USE WITH LANTUS AT BEDTIME     * BD INSULIN PEN NEEDLE UF MINI 31 gauge x 3/16" Ndle  Generic drug:  pen needle, diabetic  USE WITH LANTUS AT BEDTIME     simvastatin 20 MG tablet  Commonly known as:  ZOCOR  TAKE 1 TABLET (20 MG TOTAL) BY MOUTH EVERY EVENING.     spironolactone 25 MG tablet  Commonly known as:  ALDACTONE  Take 2 tablets (50 mg total) by mouth once daily.     valsartan 320 MG tablet  Commonly known as:  DIOVAN  Take 1 tablet (320 mg total) by mouth once daily.        * This list has 2 medication(s) that are the same as other medications prescribed for you. Read the directions carefully, and ask your doctor or other care provider to review them with you.               Where to Get Your Medications      You can get these medications from any pharmacy    Bring a paper prescription for each of these medications  · sertraline 25 MG tablet         " Margaret Reyez (Scribe)

## 2018-09-19 NOTE — PROGRESS NOTES
Last 5 Patient Entered Readings                                      Current 30 Day Average: 145/67     Recent Readings 9/19/2018 9/17/2018 9/14/2018 9/12/2018 9/11/2018    SBP (mmHg) 141 148 131 139 119    DBP (mmHg) 66 68 65 67 56    Pulse 83 76 71 74 83            Digital Medicine: Health  Follow Up    Left voicemail to follow up with Mrs. Myesha Quinones.  Current BP average 145/67 mmHg is not at goal, 140/90, mmHg. FU on breathing, sodium monitoring and medication adherence. Will try again next week.

## 2018-09-20 NOTE — PROGRESS NOTES
"Last 5 Patient Entered Readings                                      Current 30 Day Average: 145/66     Recent Readings 9/19/2018 9/17/2018 9/14/2018 9/12/2018 9/11/2018    SBP (mmHg) 141 148 131 139 119    DBP (mmHg) 66 68 65 67 56    Pulse 83 76 71 74 83          Digital Medicine: Health  Follow Up    Lifestyle Modifications:    1.Dietary Modifications (Sodium intake <2,000mg/day, food labels, dining out): Patient reports she continues to monitor salt and reports this as a strength of hers the past month. Encouraged to continue with this. States she has noticed constipation and dry mouth with new medication; discussed fluid intake. States she has been getting 2-3 12 ounce bottles per day. Encouraged her to aim for 40-50 ounces per day if she can and encouraged regular exercise as tolerated. States she feels comfortable slowly increasing fluid intake.     2.Physical Activity: Reports she has been walking "a little more than usual". Encouraged to continue with this.     3.Medication Therapy: Patient has been compliant with the medication regimen.    4.Patient has the following medication side effects/concerns: Reporting constipation and dry mouth, no other side effects/concerns at this time. Agrees to try to improve fluid intake from around 32 ounces per day to 40-50.     Follow up with  Myesha Quinones completed. No further questions or concerns. Will continue to follow up to achieve health goals.    "

## 2018-09-21 ENCOUNTER — PATIENT OUTREACH (OUTPATIENT)
Dept: OTHER | Facility: OTHER | Age: 78
End: 2018-09-21

## 2018-09-21 NOTE — PROGRESS NOTES
Last 5 Patient Entered Readings                                      Current 30 Day Average: 144/66     Recent Readings 9/20/2018 9/19/2018 9/17/2018 9/14/2018 9/12/2018    SBP (mmHg) 128 141 148 131 139    DBP (mmHg) 62 66 68 65 67    Pulse 81 83 76 71 74        Ms. Joni's BP readings are starting to trend down since starting clonidine. She does have some dry mouth and constipation, so encouraged adequate hydration. Also recommend she use a stool softener and miralax PRN constipation.     Will continue to monitor regularly. Will follow up in 1-2 months, sooner if BP begins to trend upward or downward.    Patient has my contact information and knows to call with any concerns or clinical changes.     Current HTN regimen:  Hypertension Medications             carvedilol (COREG) 25 MG tablet Take 2 tablets (50 mg total) by mouth 2 (two) times daily.    chlorthalidone (HYGROTEN) 25 MG Tab TAKE 1 TABLET (25 MG TOTAL) BY MOUTH ONCE DAILY.    cloNIDine (CATAPRES) 0.1 MG tablet Take 1 tablet (0.1 mg total) by mouth every evening.    hydrALAZINE (APRESOLINE) 100 MG tablet TAKE 1 TABLET (100 MG TOTAL) BY MOUTH 3 (THREE) TIMES DAILY.    irbesartan (AVAPRO) 300 MG tablet Take 1 tablet (300 mg total) by mouth every evening.    nitroGLYCERIN (NITROSTAT) 0.4 MG SL tablet Place 1 tablet under the tongue continuous prn.

## 2018-10-02 ENCOUNTER — TELEPHONE (OUTPATIENT)
Dept: PULMONOLOGY | Facility: CLINIC | Age: 78
End: 2018-10-02

## 2018-10-11 ENCOUNTER — PROCEDURE VISIT (OUTPATIENT)
Dept: OPHTHALMOLOGY | Facility: CLINIC | Age: 78
End: 2018-10-11
Attending: OPHTHALMOLOGY
Payer: MEDICARE

## 2018-10-11 VITALS — HEART RATE: 92 BPM | DIASTOLIC BLOOD PRESSURE: 75 MMHG | SYSTOLIC BLOOD PRESSURE: 149 MMHG

## 2018-10-11 DIAGNOSIS — H35.033 HYPERTENSIVE RETINOPATHY, BILATERAL: ICD-10-CM

## 2018-10-11 DIAGNOSIS — E11.3213 CONTROLLED TYPE 2 DIABETES MELLITUS WITH BOTH EYES AFFECTED BY MILD NONPROLIFERATIVE RETINOPATHY AND MACULAR EDEMA, WITH LONG-TERM CURRENT USE OF INSULIN: Primary | ICD-10-CM

## 2018-10-11 DIAGNOSIS — Z79.4 CONTROLLED TYPE 2 DIABETES MELLITUS WITH BOTH EYES AFFECTED BY MILD NONPROLIFERATIVE RETINOPATHY AND MACULAR EDEMA, WITH LONG-TERM CURRENT USE OF INSULIN: Primary | ICD-10-CM

## 2018-10-11 PROCEDURE — 92226 PR SPECIAL EYE EXAM, SUBSEQUENT: CPT | Mod: 50,PBBFAC,PO | Performed by: OPHTHALMOLOGY

## 2018-10-11 PROCEDURE — 92226 PR SPECIAL EYE EXAM, SUBSEQUENT: CPT | Mod: 50,S$PBB,, | Performed by: OPHTHALMOLOGY

## 2018-10-11 PROCEDURE — 92014 COMPRE OPH EXAM EST PT 1/>: CPT | Mod: S$PBB,,, | Performed by: OPHTHALMOLOGY

## 2018-10-11 PROCEDURE — 92134 CPTRZ OPH DX IMG PST SGM RTA: CPT | Mod: PBBFAC,PO | Performed by: OPHTHALMOLOGY

## 2018-10-11 NOTE — PROGRESS NOTES
"HPI     Eye Problem      Additional comments: 8 week check              Comments     DLS 8/9/18- Vision OS seems to have gotten more blurry. Right after injection she saw a long floater. She still sees this sometimes in the top of her vision          OCT - OD No ME  OS - central - improving again    Prior FA - Foveal MA's OS   No NV of NP OU      A/P    1. 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 DME OS  S/p Avastin OS x 5  S/p Ozurdex OS 8/18  Foveal MA"s OS - may need chronic pharmacotherapy    Good IOP    3. HTN Ret OU  BS/BP/chol control    4. PCIOL OU  With small PCO      2 month OCT  "

## 2018-10-18 ENCOUNTER — PATIENT OUTREACH (OUTPATIENT)
Dept: OTHER | Facility: OTHER | Age: 78
End: 2018-10-18

## 2018-10-18 NOTE — PROGRESS NOTES
Last 5 Patient Entered Readings                                      Current 30 Day Average: 143/65     Recent Readings 10/17/2018 10/16/2018 10/12/2018 10/11/2018 10/10/2018    SBP (mmHg) 138 161 122 155 155    DBP (mmHg) 61 77 51 71 73    Pulse 75 71 84 77 72          Digital Medicine: Health  Follow Up    Lifestyle Modifications:    1.Dietary Modifications (Sodium intake <2,000mg/day, food labels, dining out): Patient reports improved hydration since speaking with PharmD, noting that her dry mouth has improved and is not occurring as often. States she continues with sodium monitoring as well.     2.Physical Activity: Deferred.     3.Medication Therapy: Patient has been compliant with the medication regimen.    4.Patient has the following medication side effects/concerns: No side effects/concerns per patient. Reports she is feeling and doing well overall. Agrees to continue with proper hydration.     Follow up with Randi Myesha KEYA Joni completed. No further questions or concerns. Will continue to follow up to achieve health goals.

## 2018-10-22 ENCOUNTER — OFFICE VISIT (OUTPATIENT)
Dept: OPTOMETRY | Facility: CLINIC | Age: 78
End: 2018-10-22
Payer: COMMERCIAL

## 2018-10-22 DIAGNOSIS — Z96.1 PSEUDOPHAKIA OF BOTH EYES: ICD-10-CM

## 2018-10-22 DIAGNOSIS — H52.4 PRESBYOPIA: Primary | ICD-10-CM

## 2018-10-22 DIAGNOSIS — E11.3212 MILD NONPROLIFERATIVE DIABETIC RETINOPATHY OF LEFT EYE WITH MACULAR EDEMA ASSOCIATED WITH TYPE 2 DIABETES MELLITUS: ICD-10-CM

## 2018-10-22 DIAGNOSIS — Z13.5 SCREENING FOR GLAUCOMA: ICD-10-CM

## 2018-10-22 PROCEDURE — 92012 INTRM OPH EXAM EST PATIENT: CPT | Mod: S$GLB,,, | Performed by: OPTOMETRIST

## 2018-10-22 PROCEDURE — 92015 DETERMINE REFRACTIVE STATE: CPT | Mod: S$GLB,,, | Performed by: OPTOMETRIST

## 2018-10-22 PROCEDURE — 99999 PR PBB SHADOW E&M-EST. PATIENT-LVL II: CPT | Mod: PBBFAC,,, | Performed by: OPTOMETRIST

## 2018-10-22 RX ORDER — CHLORTHALIDONE 25 MG/1
25 TABLET ORAL DAILY
Qty: 90 TABLET | Refills: 0 | Status: SHIPPED | OUTPATIENT
Start: 2018-10-22 | End: 2019-01-16 | Stop reason: SDUPTHER

## 2018-10-22 NOTE — PROGRESS NOTES
HPI     DLS:1/25/18  Pt states her VA is getting a little worse, but she states she thinks its   from the shots causing a little blurry VA  floater in left eye   No gtts   Hx cat surgery OU    Last edited by Boaz Young, OD on 10/22/2018  8:54 AM. (History)        ROS     Positive for: Endocrine (DM), Eyes (cat surgery OU/injection hx)    Negative for: Constitutional, Gastrointestinal, Neurological, Skin,   Genitourinary, Musculoskeletal, HENT, Cardiovascular, Respiratory,   Psychiatric, Allergic/Imm, Heme/Lymph    Last edited by Boaz Young, OD on 10/22/2018  8:54 AM. (History)        Assessment /Plan     For exam results, see Encounter Report.    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 Mild pco sp pciol ou--pt wishes new spex Rx  2. fam hx glauc  3. DM- with hx NPDR OS/ MAs /exudate in mac/CSME sp inj --see retina notes  4.  ocular migraine Hx    PLAN:    1. Wrote new spex Rx  2. rtc as sched w Dr Arechiga, and 1 yr to me

## 2018-10-26 ENCOUNTER — OFFICE VISIT (OUTPATIENT)
Dept: OTOLARYNGOLOGY | Facility: CLINIC | Age: 78
End: 2018-10-26
Payer: MEDICARE

## 2018-10-26 VITALS
BODY MASS INDEX: 28.54 KG/M2 | SYSTOLIC BLOOD PRESSURE: 157 MMHG | HEIGHT: 63 IN | DIASTOLIC BLOOD PRESSURE: 74 MMHG | WEIGHT: 161.06 LBS | HEART RATE: 73 BPM

## 2018-10-26 DIAGNOSIS — H90.A32 MIXED CONDUCTIVE AND SENSORINEURAL HEARING LOSS OF LEFT EAR WITH RESTRICTED HEARING OF RIGHT EAR: ICD-10-CM

## 2018-10-26 DIAGNOSIS — H83.8X2 SUPERIOR SEMICIRCULAR CANAL DEHISCENCE OF LEFT EAR: Primary | ICD-10-CM

## 2018-10-26 DIAGNOSIS — J31.0 CHRONIC RHINITIS: ICD-10-CM

## 2018-10-26 DIAGNOSIS — H69.92 ETD (EUSTACHIAN TUBE DYSFUNCTION), LEFT: ICD-10-CM

## 2018-10-26 PROCEDURE — 99999 PR PBB SHADOW E&M-EST. PATIENT-LVL III: CPT | Mod: PBBFAC,,, | Performed by: OTOLARYNGOLOGY

## 2018-10-26 PROCEDURE — 3077F SYST BP >= 140 MM HG: CPT | Mod: CPTII,,, | Performed by: OTOLARYNGOLOGY

## 2018-10-26 PROCEDURE — 3078F DIAST BP <80 MM HG: CPT | Mod: CPTII,,, | Performed by: OTOLARYNGOLOGY

## 2018-10-26 PROCEDURE — 99213 OFFICE O/P EST LOW 20 MIN: CPT | Mod: PBBFAC,PN | Performed by: OTOLARYNGOLOGY

## 2018-10-26 PROCEDURE — 99213 OFFICE O/P EST LOW 20 MIN: CPT | Mod: S$PBB,,, | Performed by: OTOLARYNGOLOGY

## 2018-10-26 PROCEDURE — 1101F PT FALLS ASSESS-DOCD LE1/YR: CPT | Mod: CPTII,,, | Performed by: OTOLARYNGOLOGY

## 2018-10-26 RX ORDER — FLUTICASONE PROPIONATE 50 MCG
2 SPRAY, SUSPENSION (ML) NASAL DAILY
Qty: 1 BOTTLE | Refills: 12 | Status: SHIPPED | OUTPATIENT
Start: 2018-10-26 | End: 2020-07-23 | Stop reason: SDUPTHER

## 2018-10-26 NOTE — PROGRESS NOTES
"  Chief Complaint   Patient presents with    Follow-up     patient's left ear is doing better, hearing loss is the same   .     HPI:  Myesha Quinones is a very pleasant 78 y.o. female here to see me today for the first time for evaluation of hearing loss.  She states that she has had lifelong ear problems.  She reports that as a child she pulled a cup of scalding hot coffee on and inside her left ear which resulted in an eardrum perforation. She states ever since this incident she "has never had normal hearing or ear."  She relays that in 1998 she had breast cancer and had hearing loss associated with the chemotherapy and obtained her first set of hearing aids in 1999.  She is here today to establish with ENT as her prior ENT Dr. Tanner no longer takes her insurance. She was referred for evaluation of her ears and for to evaluate for cerumen removal as her hearing aids have not been working as well recently.  She reports hearing loss that has been gradually progressing over the last several years.  She has not had any recent issues with ear pain or ear drainage.  She denies a family history of hearing loss, and has not had any previous otologic surgery.  She denies any history of significant loud noise exposure.She denies issues with dizziness.    Interval HPI 10/25/2018:    Follow up SCC dehiscence, [place]  She reports that her hearing seems stable.  She feels aural fullness "fluid sensation" in the left ear is improved.  She denies ear pain or otorrhea. She continues on Astelin and Claritin and feels this is helping her aural fullness or hearing.  She denies vertigo.       Past Medical History:   Diagnosis Date    Allergy     Asthma     Basal cell carcinoma     left forehead    Basal cell carcinoma     left nose    Basal cell carcinoma 05/20/2015    right nose    Basal cell carcinoma 12/22/2015    left lower post neck    Breast cancer     CAD (coronary artery disease)     Cardiomyopathy     " Cardiomyopathy, ischemic     Cataract     CHF (congestive heart failure)     Chronic kidney disease, stage 3, mod decreased GFR 2/14/2017    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, with long-term current use of insulin 2/22/2018    COPD (chronic obstructive pulmonary disease)     COPD exacerbation 4/8/2018    Defibrillator discharge     Diabetes mellitus     Diabetes mellitus type II     Diabetes with neurologic complications     Goiter     MNG    HX: breast cancer     Hyperlipidemia     Hypertension     Iron deficiency anemia 5/16/2017    Left kidney mass     Meningioma     Osteoporosis, postmenopausal     Pacemaker     Skin cancer     s/p excision    Sleep apnea     CPAP    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/2015    mid forehead    Unspecified vitamin D deficiency     Ventricular tachycardia     Vitamin B12 deficiency     Vitamin D deficiency disease      Social History     Socioeconomic History    Marital status:      Spouse name: Not on file    Number of children: Not on file    Years of education: Not on file    Highest education level: Not on file   Social Needs    Financial resource strain: Not on file    Food insecurity - worry: Not on file    Food insecurity - inability: Not on file    Transportation needs - medical: Not on file    Transportation needs - non-medical: Not on file   Occupational History    Occupation: Homemaker     Employer: OTHER   Tobacco Use    Smoking status: Never Smoker    Smokeless tobacco: Never Used   Substance and Sexual Activity    Alcohol use: No     Alcohol/week: 0.0 oz    Drug use: No    Sexual activity: Yes     Partners: Male   Other Topics Concern    Are you pregnant or think you may be? 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    FRACTURE SURGERY      left elbow and wrist as a child    HYSTERECTOMY      MASTECTOMY Right     SQUAMOUS CELL CARCINOMA EXCISION      remved from forehead    TONSILLECTOMY       Family History   Problem Relation Age of Onset    Diabetes Father     Heart disease Father     Diabetes Sister     Heart disease Sister     Diabetes Brother     Heart disease Brother     Hypertension Brother     Diabetes Brother     Heart disease Brother     Hypertension Brother     Diabetes Brother     Heart disease Brother     Cancer Brother         colon    Diabetes Brother     Cancer Son         skin    Diabetes Son         prediabetes    Diabetes Daughter         prediabetes    Cancer Daughter         melanoma    Obesity Daughter     Melanoma Daughter     Diabetes Son     Asthma Mother     Hypertension Mother     Stroke Mother     No Known Problems Maternal Grandmother     No Known Problems Maternal Grandfather     No Known Problems Paternal Grandmother     No Known Problems Paternal Grandfather     Amblyopia Neg Hx     Blindness Neg Hx     Cataracts Neg Hx     Glaucoma Neg Hx     Macular degeneration Neg Hx     Retinal detachment Neg Hx     Strabismus Neg Hx     Thyroid disease Neg Hx          Review of Systems  General: negative for chills, fever or weight loss  Psychological: negative for mood changes or depression  Ophthalmic: negative for blurry vision, photophobia or eye pain  ENT: see HPI  Respiratory: no cough, shortness of breath, or wheezing  Cardiovascular: no chest pain or dyspnea on exertion  Gastrointestinal: no abdominal pain,  change in bowel habits, or black/ bloody stools  Musculoskeletal: negative for gait disturbance or muscular weakness  Neurological: no syncope or seizures; no ataxia  Dermatological: negative for puritis,  rash and jaundice  Hematologic/lymphatic: no easy bruising, no new lumps or bumps      Physical Exam:    Vitals:    10/26/18 0830   BP: (!) 157/74   Pulse: 73       Constitutional: Well appearing / communicating without difficutly.  NAD.  Eyes: EOM I Bilaterally  Head/Face: Normocephalic.  Negative paranasal sinus pressure/tenderness.  Salivary glands WNL.  House Brackmann I Bilaterally.    Right Ear: Auricle normal appearance. External Auditory Canal within normal limits no lesions or masses,TM w/o masses/lesions/perforationsp; monomeric segment centrally.  TM mobility noted.   Left Ear: Auricle normal appearance. External Auditory Canal within normal limits no lesions or masses,TM w/ thickened with tympanosclerosis present.  No effusion. TM mobility reduced.   Rinne Air conduction >bone conduction bilaterally, Herrera midline.   Nose: No gross nasal septal deviation. Inferior Turbinates 3+ bilaterally. No septal perforation. No masses/lesions. External nasal skin appears normal without masses/lesions.  Oral Cavity: Gingiva/lips within normal limits.  Dentition/gingiva healthy appearing. Mucus membranes moist. Floor of mouth soft, no masses palpated. Oral Tongue mobile. Hard Palate appears normal.    Oropharynx: Base of tongue appears normal. No masses/lesions noted. Tonsillar fossa/pharyngeal wall without lesions. Posterior oropharynx WNL.  Soft palate without masses. Midline uvula.   Neck/Lymphatic: No LAD I-VI bilaterally.  No thyromegaly.  No masses noted on exam.    Mirror laryngoscopy/nasopharyngoscopy: Active gag reflex.  Unable to perform.    Neuro/Psychiatric: AOx3.  Normal mood and affect.   Cardiovascular: Normal carotid pulses bilaterally, no increasing jugular venous distention noted at cervical region  bilaterally.    Respiratory: Normal respiratory effort, no stridor, no retractions noted.    Diagnostic testing ordered and reviewed:    CT temporal bones 1/31/2018:   Mild soft tissue thickening of the left tympanic membrane.  No middle ear/ mastoid involvement.    Mild dehiscence of the left superior semicircular canal. Right temporal bone unremarkable.      Assessment:    ICD-10-CM ICD-9-CM    1. 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 Mixed conductive and sensorineural hearing loss of left ear with restricted hearing of right ear H90.A32 389.22    3. ETD (Eustachian tube dysfunction), left H69.82 381.81    4. 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 Diagnoses of Mixed conductive and sensorineural hearing loss of left ear with restricted hearing of right ear, ETD (Eustachian tube dysfunction), left, and Chronic rhinitis were also pertinent to this visit.      Plan:  No orders of the defined types were placed in this encounter.    We reviewed her diagnosis of left semicircular canal dehiscence and how this affects the hearing.  I discussed options for treatment including continuing conservative measure of amplification via hearing aids versus surgical management of semicircular canal dehiscence. We discussed the risk/benefits and overall expected outcomes of each option. The patient wishes to continue with hearing aids.  Next Audiogram due Jan. 2019.    Chronic rhinitis. Continue Flonase and  Xyzal.       Follow up 4 months with audiogram.     25 minutes total time was spent with the patient with over 50% of the time spent counseling regarding above.     Brittany Metcalf MD

## 2018-11-08 DIAGNOSIS — I10 ESSENTIAL HYPERTENSION: ICD-10-CM

## 2018-11-09 RX ORDER — HYDRALAZINE HYDROCHLORIDE 100 MG/1
100 TABLET, FILM COATED ORAL 3 TIMES DAILY
Qty: 90 TABLET | Refills: 6 | Status: SHIPPED | OUTPATIENT
Start: 2018-11-09 | End: 2019-08-08 | Stop reason: SDUPTHER

## 2018-11-16 RX ORDER — SIMVASTATIN 20 MG/1
20 TABLET, FILM COATED ORAL NIGHTLY
Qty: 90 TABLET | Refills: 3 | Status: SHIPPED | OUTPATIENT
Start: 2018-11-16 | End: 2019-06-06 | Stop reason: ALTCHOICE

## 2018-11-21 ENCOUNTER — PATIENT OUTREACH (OUTPATIENT)
Dept: OTHER | Facility: OTHER | Age: 78
End: 2018-11-21

## 2018-11-21 NOTE — PROGRESS NOTES
Last 5 Patient Entered Readings                                      Current 30 Day Average: 148/69     Recent Readings 11/15/2018 11/13/2018 11/10/2018 11/7/2018 11/6/2018    SBP (mmHg) 146 165 163 154 155    DBP (mmHg) 71 78 76 72 67    Pulse 76 73 76 73 77          Digital Medicine: Health  Follow Up    Left voicemail to follow up with Mrs. Myesha Jonesplet.  Current BP average 148/69 mmHg is not at goal, 140/90 mmHg. FU on hydration and inquire about recent upward trend in bp.

## 2018-11-26 ENCOUNTER — TELEPHONE (OUTPATIENT)
Dept: CARDIOLOGY | Facility: HOSPITAL | Age: 78
End: 2018-11-26

## 2018-11-26 ENCOUNTER — TELEPHONE (OUTPATIENT)
Dept: ELECTROPHYSIOLOGY | Facility: CLINIC | Age: 78
End: 2018-11-26

## 2018-11-26 NOTE — TELEPHONE ENCOUNTER
Returned patient's call and remote ICD home monitor appt was rescheduled to 12/17/18 and the following home monitor appt being on 3/18/18.  Patient verbalized understanding.

## 2018-11-26 NOTE — TELEPHONE ENCOUNTER
----- Message from Bruno Woodall MA sent at 11/26/2018 12:59 PM CST -----  Contact: self  I do not see on med list. Not sure if she is supposed to take this still  ----- Message -----  From: Audrey Gary MA  Sent: 11/26/2018  12:19 PM  To: Mike Montoya Staff    Pt. is calling to get a refill on Metoprolol 25 mg to [facility] pharmacy.   pt. Last visit 4/3/18. Please call.

## 2018-11-26 NOTE — TELEPHONE ENCOUNTER
----- Message from Audrey Gary MA sent at 11/26/2018 12:23 PM CST -----  Contact: self  Pt. had a home device appt. on 11/19,it was cancel ,she didn't even know about it,she was out of town. It was rescheduled to 2/18.She stated that would be too long to wait because battery is weak. Please call.[phone]. pt.

## 2018-11-27 ENCOUNTER — TELEPHONE (OUTPATIENT)
Dept: ENDOCRINOLOGY | Facility: CLINIC | Age: 78
End: 2018-11-27

## 2018-11-27 ENCOUNTER — PATIENT MESSAGE (OUTPATIENT)
Dept: ENDOCRINOLOGY | Facility: CLINIC | Age: 78
End: 2018-11-27

## 2018-12-07 ENCOUNTER — PATIENT MESSAGE (OUTPATIENT)
Dept: ELECTROPHYSIOLOGY | Facility: CLINIC | Age: 78
End: 2018-12-07

## 2018-12-07 DIAGNOSIS — Z45.02 ICD (IMPLANTABLE CARDIOVERTER-DEFIBRILLATOR) BATTERY DEPLETION: Primary | ICD-10-CM

## 2018-12-07 DIAGNOSIS — I42.8 NONISCHEMIC CARDIOMYOPATHY: Primary | ICD-10-CM

## 2018-12-07 DIAGNOSIS — I49.9 CARDIAC ARRHYTHMIA, UNSPECIFIED CARDIAC ARRHYTHMIA TYPE: ICD-10-CM

## 2018-12-07 NOTE — PROGRESS NOTES
IMPLANTABLE DEVICE EDUCATION CHECKLIST  ICD Battery Change      Medications  · Take 1/2 dose of insulin the evening prior to procedure  · HOLD Insulin, Metformin, Amaryl (glimiperide) and Tradjenta (ALL Diabetic Meds) on day of procedure  · You may take your other usual morning medications with a sip of water.      PRE PROCEDURE TESTING     12/10/2018 @ 9 AM   Pre-procedure labs have been ordered for you at:  [facility]  ·  YOU DO NOT HAVE TO FAST FOR THIS LAB WORK!      DAY OF PROCEDURE    12/17/2018 @ 9:30 AM   Report to Cardiology Waiting Room on the 3rd floor of the Hospital  · Wash your chest with HIBICLENS OR AN ANTIBACTERIAL SOAP (such as Dial) on the night before and the morning of your procedure.  · Please do not wear makeup, especially mascara, when arriving for your procedure.  · Do not eat or drink anything after 12 mn on the night before your procedure    Directions to Cardiology Waiting Room  If you park in the Parking Garage:  Take elevators to the 2nd floor  Walk up ramp and turn right by Gold Elevators  Take elevator to the 3rd floor  Upon exiting the elevator, turn away from the clinic areas  Walk long gamble around to front of hospital to area with windows overlooking [facility]  Check in at Reception Desk  OR  If family is dropping you off:  Have them drop you off at the front of the Hospital  (Near the ER, where all the flags are hung).  Take the E elevators to the 3rd floor.  Check in at the Reception Desk in the waiting room.    · YOU WILL BE GOING HOME AFTER YOUR PROCEDURE  · YOU WILL NEED SOMEONE TO DRIVE YOU HOME AFTER YOUR PROCEDURE  · YOUR PAIN DURING YOUR PROCEDURE WILL BE MANAGED BY THE ANESTHESIA TEAM    Any need to reschedule or cancel procedures, or any questions regarding your procedures should be addressed directly with the Arrhythmia Department Nurses at the following phone number: [phone]

## 2018-12-10 ENCOUNTER — LAB VISIT (OUTPATIENT)
Dept: LAB | Facility: HOSPITAL | Age: 78
End: 2018-12-10
Attending: INTERNAL MEDICINE
Payer: MEDICARE

## 2018-12-10 DIAGNOSIS — I49.9 CARDIAC ARRHYTHMIA, UNSPECIFIED CARDIAC ARRHYTHMIA TYPE: ICD-10-CM

## 2018-12-10 DIAGNOSIS — Z45.02 ICD (IMPLANTABLE CARDIOVERTER-DEFIBRILLATOR) BATTERY DEPLETION: ICD-10-CM

## 2018-12-10 LAB
ANION GAP SERPL CALC-SCNC: 11 MMOL/L
APTT BLDCRRT: 27.1 SEC
BASOPHILS # BLD AUTO: 0.09 K/UL
BASOPHILS NFR BLD: 1 %
BUN SERPL-MCNC: 39 MG/DL
CALCIUM SERPL-MCNC: 9.7 MG/DL
CHLORIDE SERPL-SCNC: 99 MMOL/L
CO2 SERPL-SCNC: 26 MMOL/L
CREAT SERPL-MCNC: 1.5 MG/DL
DIFFERENTIAL METHOD: ABNORMAL
EOSINOPHIL # BLD AUTO: 0.9 K/UL
EOSINOPHIL NFR BLD: 9.9 %
ERYTHROCYTE [DISTWIDTH] IN BLOOD BY AUTOMATED COUNT: 12.9 %
EST. GFR  (AFRICAN AMERICAN): 37.9 ML/MIN/1.73 M^2
EST. GFR  (NON AFRICAN AMERICAN): 32.9 ML/MIN/1.73 M^2
GLUCOSE SERPL-MCNC: 194 MG/DL
HCT VFR BLD AUTO: 34.5 %
HGB BLD-MCNC: 10.6 G/DL
IMM GRANULOCYTES # BLD AUTO: 0.09 K/UL
IMM GRANULOCYTES NFR BLD AUTO: 1 %
INR PPP: 1
LYMPHOCYTES # BLD AUTO: 1.8 K/UL
LYMPHOCYTES NFR BLD: 21 %
MCH RBC QN AUTO: 28.9 PG
MCHC RBC AUTO-ENTMCNC: 30.7 G/DL
MCV RBC AUTO: 94 FL
MONOCYTES # BLD AUTO: 0.7 K/UL
MONOCYTES NFR BLD: 7.6 %
NEUTROPHILS # BLD AUTO: 5.2 K/UL
NEUTROPHILS NFR BLD: 59.5 %
NRBC BLD-RTO: 0 /100 WBC
PLATELET # BLD AUTO: 239 K/UL
PMV BLD AUTO: 12.1 FL
POTASSIUM SERPL-SCNC: 4.2 MMOL/L
PROTHROMBIN TIME: 10.3 SEC
RBC # BLD AUTO: 3.67 M/UL
SODIUM SERPL-SCNC: 136 MMOL/L
WBC # BLD AUTO: 8.7 K/UL

## 2018-12-10 PROCEDURE — 80048 BASIC METABOLIC PNL TOTAL CA: CPT | Mod: HCNC

## 2018-12-10 PROCEDURE — 85025 COMPLETE CBC W/AUTO DIFF WBC: CPT | Mod: HCNC

## 2018-12-10 PROCEDURE — 85730 THROMBOPLASTIN TIME PARTIAL: CPT | Mod: HCNC

## 2018-12-10 PROCEDURE — 85610 PROTHROMBIN TIME: CPT | Mod: HCNC

## 2018-12-10 PROCEDURE — 36415 COLL VENOUS BLD VENIPUNCTURE: CPT | Mod: HCNC,PO

## 2018-12-12 ENCOUNTER — TELEPHONE (OUTPATIENT)
Dept: INTERNAL MEDICINE | Facility: CLINIC | Age: 78
End: 2018-12-12

## 2018-12-12 DIAGNOSIS — E11.42 DIABETIC POLYNEUROPATHY ASSOCIATED WITH TYPE 2 DIABETES MELLITUS: ICD-10-CM

## 2018-12-12 RX ORDER — INSULIN GLARGINE 100 [IU]/ML
30 INJECTION, SOLUTION SUBCUTANEOUS NIGHTLY
Qty: 3 BOX | Refills: 3 | Status: CANCELLED | OUTPATIENT
Start: 2018-12-12

## 2018-12-12 RX ORDER — INSULIN GLARGINE 100 [IU]/ML
30 INJECTION, SOLUTION SUBCUTANEOUS NIGHTLY
Qty: 3 BOX | Refills: 3 | Status: SHIPPED | OUTPATIENT
Start: 2018-12-12 | End: 2019-03-27 | Stop reason: SDUPTHER

## 2018-12-12 NOTE — TELEPHONE ENCOUNTER
----- Message from Andre Longo sent at 12/12/2018 10:46 AM CST -----  Contact: Patient [phone]  Is this a refill or new RX:  Refill    RX name and strength: insulin glargine (LANTUS SOLOSTAR) 100 unit/mL (3 mL) InPn pen      Pharmacy name and phone #  She said this request needs to be faxed to [employer], patient connection program, that helps her with her prescription cost. She said they faxed the request to you about 2 weeks ago.    Comments:  She wants a callback to let her know if you need their fax number.

## 2018-12-12 NOTE — TELEPHONE ENCOUNTER
----- Message from Andre Longo sent at 12/12/2018 10:46 AM CST -----  Contact: Patient [phone]  Is this a refill or new RX:  Refill    RX name and strength: insulin glargine (LANTUS SOLOSTAR) 100 unit/mL (3 mL) InPn pen      Pharmacy name and phone #  She said this request needs to be faxed to [facility], patient connection program, that helps her with her prescription cost. She said they faxed the request to you about 2 weeks ago.    Comments:  She wants a callback to let her know if you need their fax number.

## 2018-12-12 NOTE — TELEPHONE ENCOUNTER
Please call Kirsten in Pharmacy Assistance. I believe she handles the paper work. It was the green plastic folder that I signed I believe last week. GML

## 2018-12-13 ENCOUNTER — OFFICE VISIT (OUTPATIENT)
Dept: OPHTHALMOLOGY | Facility: CLINIC | Age: 78
End: 2018-12-13
Payer: MEDICARE

## 2018-12-13 ENCOUNTER — PATIENT OUTREACH (OUTPATIENT)
Dept: OTHER | Facility: OTHER | Age: 78
End: 2018-12-13

## 2018-12-13 DIAGNOSIS — I10 ESSENTIAL HYPERTENSION: ICD-10-CM

## 2018-12-13 DIAGNOSIS — H35.033 HYPERTENSIVE RETINOPATHY, BILATERAL: ICD-10-CM

## 2018-12-13 DIAGNOSIS — E11.3213 CONTROLLED TYPE 2 DIABETES MELLITUS WITH BOTH EYES AFFECTED BY MILD NONPROLIFERATIVE RETINOPATHY AND MACULAR EDEMA, WITH LONG-TERM CURRENT USE OF INSULIN: Primary | ICD-10-CM

## 2018-12-13 DIAGNOSIS — Z79.4 CONTROLLED TYPE 2 DIABETES MELLITUS WITH BOTH EYES AFFECTED BY MILD NONPROLIFERATIVE RETINOPATHY AND MACULAR EDEMA, WITH LONG-TERM CURRENT USE OF INSULIN: Primary | ICD-10-CM

## 2018-12-13 PROCEDURE — 67028 INJECTION EYE DRUG: CPT | Mod: HCNC,LT,S$GLB, | Performed by: OPHTHALMOLOGY

## 2018-12-13 PROCEDURE — 92014 COMPRE OPH EXAM EST PT 1/>: CPT | Mod: 25,HCNC,S$GLB, | Performed by: OPHTHALMOLOGY

## 2018-12-13 PROCEDURE — 99999 PR PBB SHADOW E&M-EST. PATIENT-LVL II: CPT | Mod: PBBFAC,HCNC,, | Performed by: OPHTHALMOLOGY

## 2018-12-13 RX ORDER — CLONIDINE HYDROCHLORIDE 0.1 MG/1
0.1 TABLET ORAL NIGHTLY
Qty: 30 TABLET | Refills: 3 | Status: SHIPPED | OUTPATIENT
Start: 2018-12-13 | End: 2019-02-01 | Stop reason: ALTCHOICE

## 2018-12-13 NOTE — PROGRESS NOTES
"HPI     DLS 10/11/18  Pt seem to think her vision is a little better. No flashes no floaters no blurred vision.         OCT - OD No ME  OS - central increase    Prior FA - Foveal MA's OS   No NV of NP OU      A/P    1. 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 DME OS  S/p Avastin OS x 5  S/p Ozurdex OS 8/18  Foveal MA"s OS - may need chronic pharmacotherapy    Ozurdex OS today    3. HTN Ret OU  BS/BP/chol control    4. PCIOL OU  With small PCO      2 month OCT      Risks, benefits, and alternatives to treatment discussed in detail with the patient.  The patient voiced understanding and wished to proceed with the procedure    Injection Procedure Note:  Diagnosis: DME OS    Patient Identified and Time Out complete  Topical Proparacaine and Betadine. subconj lido  Inject Ozurdex OS  at 6:00 @ 3.5-4mm posterior to limbus  Post Operative Dx: Same  Complications: None  Follow up as above.    "

## 2018-12-13 NOTE — PATIENT INSTRUCTIONS

## 2018-12-13 NOTE — PROGRESS NOTES
"Last 5 Patient Entered Readings                                      Current 30 Day Average: 152/69     Recent Readings 12/11/2018 12/7/2018 12/5/2018 12/2/2018 11/29/2018    SBP (mmHg) 150 147 148 140 154    DBP (mmHg) 64 66 69 63 70    Pulse 89 74 80 77 74        Mrs. Quinones's BP has been higher recently. She thinks it is from the "stress around the holidays." She also has to have her pacemaker replaced on Monday. She is hoping BP will improve once things "settle down." Will not make any changes at this time.    Patient denies s/s of hypertension (SOB, CP, severe headaches, changes in vision) associated with high readings. Instructed patient to go to the ED if BP > 180/110 and accompanied by hypertensive s/s, patient confirms understanding.    Will continue to monitor regularly. Will follow up in 3-4 weeks, sooner if BP begins to trend upward or downward.    Patient has my contact information and knows to call with any concerns or clinical changes.     Current HTN regimen:  Hypertension Medications             carvedilol (COREG) 25 MG tablet Take 2 tablets (50 mg total) by mouth 2 (two) times daily.    chlorthalidone (HYGROTEN) 25 MG Tab TAKE 1 TABLET (25 MG TOTAL) BY MOUTH ONCE DAILY.    cloNIDine (CATAPRES) 0.1 MG tablet Take 1 tablet (0.1 mg total) by mouth every evening.    hydrALAZINE (APRESOLINE) 100 MG tablet TAKE 1 TABLET (100 MG TOTAL) BY MOUTH 3 (THREE) TIMES DAILY.    irbesartan (AVAPRO) 300 MG tablet Take 1 tablet (300 mg total) by mouth every evening.    nitroGLYCERIN (NITROSTAT) 0.4 MG SL tablet Place 1 tablet under the tongue continuous prn.              "

## 2018-12-14 ENCOUNTER — TELEPHONE (OUTPATIENT)
Dept: ELECTROPHYSIOLOGY | Facility: CLINIC | Age: 78
End: 2018-12-14

## 2018-12-14 NOTE — TELEPHONE ENCOUNTER
Called pt to review pre op instructions for generator change Monday AM. Pt verbalized understanding and denies any questions/concerns at this time.

## 2018-12-14 NOTE — TELEPHONE ENCOUNTER
----- Message from Bruno Woodall MA sent at 12/14/2018 10:04 AM CST -----      ----- Message -----  From: Kaitlyn Bee  Sent: 12/14/2018   9:56 AM  To: Mike Montoya Staff    Hello    Just wanted to follow up to advise that Ms Joni procedure that is scheduled for Monday is now approved. thanks

## 2018-12-17 ENCOUNTER — ANESTHESIA (OUTPATIENT)
Dept: MEDSURG UNIT | Facility: HOSPITAL | Age: 78
End: 2018-12-17
Payer: MEDICARE

## 2018-12-17 ENCOUNTER — ANESTHESIA EVENT (OUTPATIENT)
Dept: MEDSURG UNIT | Facility: HOSPITAL | Age: 78
End: 2018-12-17
Payer: MEDICARE

## 2018-12-17 ENCOUNTER — HOSPITAL ENCOUNTER (OUTPATIENT)
Facility: HOSPITAL | Age: 78
Discharge: HOME | End: 2018-12-17
Attending: INTERNAL MEDICINE | Admitting: INTERNAL MEDICINE
Payer: MEDICARE

## 2018-12-17 VITALS
RESPIRATION RATE: 18 BRPM | TEMPERATURE: 98 F | SYSTOLIC BLOOD PRESSURE: 170 MMHG | BODY MASS INDEX: 27.82 KG/M2 | HEART RATE: 72 BPM | DIASTOLIC BLOOD PRESSURE: 76 MMHG | HEIGHT: 63 IN | OXYGEN SATURATION: 95 % | WEIGHT: 157 LBS

## 2018-12-17 DIAGNOSIS — I10 ESSENTIAL HYPERTENSION: ICD-10-CM

## 2018-12-17 DIAGNOSIS — Z45.02 ICD (IMPLANTABLE CARDIOVERTER-DEFIBRILLATOR) BATTERY DEPLETION: Primary | ICD-10-CM

## 2018-12-17 DIAGNOSIS — E11.42 DIABETIC SENSORIMOTOR NEUROPATHY: ICD-10-CM

## 2018-12-17 DIAGNOSIS — I42.8 NONISCHEMIC CARDIOMYOPATHY: ICD-10-CM

## 2018-12-17 LAB
POCT GLUCOSE: 145 MG/DL (ref 70–110)
POCT GLUCOSE: 174 MG/DL (ref 70–110)

## 2018-12-17 PROCEDURE — 82962 GLUCOSE BLOOD TEST: CPT | Mod: HCNC

## 2018-12-17 PROCEDURE — 93010 ELECTROCARDIOGRAM REPORT: CPT | Mod: HCNC,,, | Performed by: INTERNAL MEDICINE

## 2018-12-17 PROCEDURE — 33264 RMVL & RPLCMT DFB GEN MLT LD: CPT | Mod: HCNC | Performed by: INTERNAL MEDICINE

## 2018-12-17 PROCEDURE — 63600175 PHARM REV CODE 636 W HCPCS: Mod: HCNC | Performed by: NURSE ANESTHETIST, CERTIFIED REGISTERED

## 2018-12-17 PROCEDURE — 37000009 HC ANESTHESIA EA ADD 15 MINS: Mod: HCNC | Performed by: INTERNAL MEDICINE

## 2018-12-17 PROCEDURE — 25000003 PHARM REV CODE 250: Mod: HCNC | Performed by: NURSE ANESTHETIST, CERTIFIED REGISTERED

## 2018-12-17 PROCEDURE — 25000003 PHARM REV CODE 250: Mod: HCNC | Performed by: INTERNAL MEDICINE

## 2018-12-17 PROCEDURE — 33264 RMVL & RPLCMT DFB GEN MLT LD: CPT | Mod: HCNC,,, | Performed by: INTERNAL MEDICINE

## 2018-12-17 PROCEDURE — S0020 INJECTION, BUPIVICAINE HYDRO: HCPCS | Mod: HCNC | Performed by: INTERNAL MEDICINE

## 2018-12-17 PROCEDURE — 25000003 PHARM REV CODE 250: Mod: HCNC | Performed by: NURSE PRACTITIONER

## 2018-12-17 PROCEDURE — 63600175 PHARM REV CODE 636 W HCPCS: Mod: HCNC | Performed by: NURSE PRACTITIONER

## 2018-12-17 PROCEDURE — 63600175 PHARM REV CODE 636 W HCPCS: Mod: HCNC | Performed by: INTERNAL MEDICINE

## 2018-12-17 PROCEDURE — 82962 GLUCOSE BLOOD TEST: CPT | Mod: HCNC | Performed by: INTERNAL MEDICINE

## 2018-12-17 PROCEDURE — 93005 ELECTROCARDIOGRAM TRACING: CPT | Mod: HCNC,59

## 2018-12-17 PROCEDURE — A4216 STERILE WATER/SALINE, 10 ML: HCPCS | Mod: HCNC | Performed by: NURSE ANESTHETIST, CERTIFIED REGISTERED

## 2018-12-17 PROCEDURE — 27201423 OPTIME MED/SURG SUP & DEVICES STERILE SUPPLY: Mod: HCNC | Performed by: INTERNAL MEDICINE

## 2018-12-17 PROCEDURE — 37000008 HC ANESTHESIA 1ST 15 MINUTES: Mod: HCNC | Performed by: INTERNAL MEDICINE

## 2018-12-17 PROCEDURE — D9220A PRA ANESTHESIA: Mod: HCNC,ANES,, | Performed by: ANESTHESIOLOGY

## 2018-12-17 PROCEDURE — C1882 AICD, OTHER THAN SING/DUAL: HCPCS | Mod: HCNC | Performed by: INTERNAL MEDICINE

## 2018-12-17 PROCEDURE — 93005 ELECTROCARDIOGRAM TRACING: CPT | Mod: HCNC

## 2018-12-17 DEVICE — IMPLANTABLE DEVICE: Type: IMPLANTABLE DEVICE | Site: HEART | Status: FUNCTIONAL

## 2018-12-17 RX ORDER — LIDOCAINE HYDROCHLORIDE 20 MG/ML
INJECTION, SOLUTION EPIDURAL; INFILTRATION; INTRACAUDAL; PERINEURAL
Status: DISCONTINUED | OUTPATIENT
Start: 2018-12-17 | End: 2018-12-17 | Stop reason: HOSPADM

## 2018-12-17 RX ORDER — SODIUM CHLORIDE 9 MG/ML
INJECTION, SOLUTION INTRAVENOUS CONTINUOUS
Status: DISCONTINUED | OUTPATIENT
Start: 2018-12-17 | End: 2021-08-09

## 2018-12-17 RX ORDER — FENTANYL CITRATE 50 UG/ML
INJECTION, SOLUTION INTRAMUSCULAR; INTRAVENOUS
Status: DISCONTINUED | OUTPATIENT
Start: 2018-12-17 | End: 2018-12-17

## 2018-12-17 RX ORDER — CEPHALEXIN 500 MG/1
500 CAPSULE ORAL EVERY 8 HOURS
Qty: 15 CAPSULE | Refills: 0 | Status: SHIPPED | OUTPATIENT
Start: 2018-12-17 | End: 2019-01-07

## 2018-12-17 RX ORDER — CEFAZOLIN SODIUM 1 G/3ML
2 INJECTION, POWDER, FOR SOLUTION INTRAMUSCULAR; INTRAVENOUS
Status: COMPLETED | OUTPATIENT
Start: 2018-12-17 | End: 2018-12-17

## 2018-12-17 RX ORDER — KETAMINE HYDROCHLORIDE 10 MG/ML
INJECTION, SOLUTION INTRAMUSCULAR; INTRAVENOUS
Status: DISCONTINUED | OUTPATIENT
Start: 2018-12-17 | End: 2018-12-17

## 2018-12-17 RX ORDER — METFORMIN HYDROCHLORIDE 500 MG/1
1000 TABLET ORAL 2 TIMES DAILY WITH MEALS
Qty: 360 TABLET | Refills: 3 | Status: SHIPPED | OUTPATIENT
Start: 2018-12-20 | End: 2019-10-15 | Stop reason: SDUPTHER

## 2018-12-17 RX ORDER — SODIUM CHLORIDE 0.9 % (FLUSH) 0.9 %
3 SYRINGE (ML) INJECTION
Status: DISCONTINUED | OUTPATIENT
Start: 2018-12-17 | End: 2018-12-17 | Stop reason: HOSPADM

## 2018-12-17 RX ORDER — ACETAMINOPHEN 325 MG/1
650 TABLET ORAL EVERY 4 HOURS PRN
Status: DISCONTINUED | OUTPATIENT
Start: 2018-12-17 | End: 2019-12-10 | Stop reason: ALTCHOICE

## 2018-12-17 RX ORDER — HYDROCODONE BITARTRATE AND ACETAMINOPHEN 5; 325 MG/1; MG/1
1 TABLET ORAL EVERY 4 HOURS PRN
Status: DISCONTINUED | OUTPATIENT
Start: 2018-12-17 | End: 2018-12-17 | Stop reason: HOSPADM

## 2018-12-17 RX ORDER — ALBUTEROL SULFATE 90 UG/1
2 AEROSOL, METERED RESPIRATORY (INHALATION) EVERY 6 HOURS PRN
Status: DISCONTINUED | OUTPATIENT
Start: 2018-12-17 | End: 2018-12-17 | Stop reason: HOSPADM

## 2018-12-17 RX ORDER — FENTANYL CITRATE 50 UG/ML
25 INJECTION, SOLUTION INTRAMUSCULAR; INTRAVENOUS EVERY 5 MIN PRN
Status: DISCONTINUED | OUTPATIENT
Start: 2018-12-17 | End: 2018-12-17 | Stop reason: HOSPADM

## 2018-12-17 RX ORDER — BUPIVACAINE HYDROCHLORIDE 5 MG/ML
INJECTION, SOLUTION EPIDURAL; INTRACAUDAL
Status: DISCONTINUED | OUTPATIENT
Start: 2018-12-17 | End: 2018-12-17 | Stop reason: HOSPADM

## 2018-12-17 RX ORDER — IRBESARTAN 300 MG/1
300 TABLET ORAL NIGHTLY
Qty: 90 TABLET | Refills: 3 | Status: SHIPPED | OUTPATIENT
Start: 2018-12-20 | End: 2019-05-30 | Stop reason: SDUPTHER

## 2018-12-17 RX ORDER — VANCOMYCIN HYDROCHLORIDE 1 G/20ML
INJECTION, POWDER, LYOPHILIZED, FOR SOLUTION INTRAVENOUS
Status: DISCONTINUED | OUTPATIENT
Start: 2018-12-17 | End: 2018-12-17 | Stop reason: HOSPADM

## 2018-12-17 RX ORDER — DEXMEDETOMIDINE HYDROCHLORIDE 100 UG/ML
INJECTION, SOLUTION INTRAVENOUS
Status: DISCONTINUED | OUTPATIENT
Start: 2018-12-17 | End: 2018-12-17

## 2018-12-17 RX ORDER — PROPOFOL 10 MG/ML
VIAL (ML) INTRAVENOUS
Status: DISCONTINUED | OUTPATIENT
Start: 2018-12-17 | End: 2018-12-17

## 2018-12-17 RX ADMIN — KETAMINE HYDROCHLORIDE 10 MG: 10 INJECTION, SOLUTION INTRAMUSCULAR; INTRAVENOUS at 01:12

## 2018-12-17 RX ADMIN — KETAMINE HYDROCHLORIDE 10 MG: 10 INJECTION, SOLUTION INTRAMUSCULAR; INTRAVENOUS at 02:12

## 2018-12-17 RX ADMIN — PROPOFOL 10 MG: 10 INJECTION, EMULSION INTRAVENOUS at 01:12

## 2018-12-17 RX ADMIN — PROPOFOL 20 MG: 10 INJECTION, EMULSION INTRAVENOUS at 01:12

## 2018-12-17 RX ADMIN — PROPOFOL 20 MG: 10 INJECTION, EMULSION INTRAVENOUS at 02:12

## 2018-12-17 RX ADMIN — FENTANYL CITRATE 25 MCG: 50 INJECTION, SOLUTION INTRAMUSCULAR; INTRAVENOUS at 01:12

## 2018-12-17 RX ADMIN — DEXMEDETOMIDINE HYDROCHLORIDE 17.8 MCG: 100 INJECTION, SOLUTION, CONCENTRATE INTRAVENOUS at 01:12

## 2018-12-17 RX ADMIN — SODIUM CHLORIDE: 0.9 INJECTION, SOLUTION INTRAVENOUS at 12:12

## 2018-12-17 RX ADMIN — DEXMEDETOMIDINE HYDROCHLORIDE 35.6 MCG: 100 INJECTION, SOLUTION, CONCENTRATE INTRAVENOUS at 12:12

## 2018-12-17 RX ADMIN — DEXMEDETOMIDINE HYDROCHLORIDE 1 MCG/KG/HR: 100 INJECTION, SOLUTION, CONCENTRATE INTRAVENOUS at 12:12

## 2018-12-17 RX ADMIN — CEFAZOLIN 2 G: 330 INJECTION, POWDER, FOR SOLUTION INTRAMUSCULAR; INTRAVENOUS at 01:12

## 2018-12-17 RX ADMIN — FENTANYL CITRATE 50 MCG: 50 INJECTION, SOLUTION INTRAMUSCULAR; INTRAVENOUS at 12:12

## 2018-12-17 RX ADMIN — DEXMEDETOMIDINE HYDROCHLORIDE 17.8 MCG: 100 INJECTION, SOLUTION, CONCENTRATE INTRAVENOUS at 02:12

## 2018-12-17 RX ADMIN — PROPOFOL 30 MG: 10 INJECTION, EMULSION INTRAVENOUS at 01:12

## 2018-12-17 NOTE — NURSING
Awake alert and oriented.  Denies pain or SOB.  Resp even and unlabored.  icepack in place to left upper shoulder dressing.  Left upper shoulder dressing clean dry and intact.  VSS.  Will continue to monitor.

## 2018-12-17 NOTE — BRIEF OP NOTE
Patient is s/p [employer]  generator change :  Tolerated procedure well. No acute complication noted.  Post op care per protocol.  Will monitor in recovery on tele   NO HEPARIN PRODUCTS  Keflex 500 mg TID for 5 days at discharge  No lifting over 5 pounds  Do not let beam of shower/water hit site directly and no scrubbing in area  Follow up in device clinic in 1 week and with Ep clinic  in 3 months.

## 2018-12-17 NOTE — ANESTHESIA PREPROCEDURE EVALUATION
12/17/2018  Pre-operative evaluation for Procedure(s) (LRB):  REPLACEMENT, ICD GENERATOR (N/A)    Myesha Quinones is a 78 y.o. female NICM (EF has normalized), asthma (took advair and albuterol this am), CAD, HTN, DMII, KHOA, CKD3    Patient Active Problem List   Diagnosis    History of nonmelanoma skin cancer    Nonischemic cardiomyopathy    LBBB (left bundle branch block)    Chronic systolic heart failure    Nontoxic multinodular goiter    Meningioma    Asthma, chronic    Biventricular ICD (implantable cardioverter-defibrillator) in place    Tremor    Coronary artery disease involving native coronary artery without angina pectoris - Non-obstructive 50% LAD    Essential hypertension    Hyperlipidemia    History of breast cancer    Adrenal cortical nodule: repeat CT 6/2016    Calcification of aorta    Diabetic polyneuropathy associated with type 2 diabetes mellitus    Postinflammatory pulmonary fibrosis    Osteoporosis    KHOA (obstructive sleep apnea)    Type 2 diabetes mellitus with stage 3 chronic kidney disease, with long-term current use of insulin    Chronic kidney disease, stage 3, mod decreased GFR    Iron deficiency anemia    Chemotherapy-induced neuropathy    Hypomagnesemia    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, with long-term current use of insulin    Hypertensive retinopathy, bilateral    Multiple lung nodules    COPD (chronic obstructive pulmonary disease)    Mixed conductive and sensorineural hearing loss    Cerebral meningioma    ICD (implantable cardioverter-defibrillator) battery depletion       Review of patient's allergies indicates:   Allergen Reactions    Iodine and iodide containing products Hives    Nifedipine      weakness       No current facility-administered  medications on file prior to encounter.      Current Outpatient Medications on File Prior to Encounter   Medication Sig Dispense Refill    albuterol 90 mcg/actuation inhaler Inhale 2 puffs into the lungs every 6 (six) hours as needed for Wheezing. Rescue 1 Inhaler 12    albuterol-ipratropium (DUO-NEB) 2.5 mg-0.5 mg/3 mL nebulizer solution TAKE 1 VIAL BY NEBULIZATION EVERY 4 (FOUR) HOURS. RESCUE 90 mL 3    aspirin (ENTERIC COATED ASPIRIN) 81 MG EC tablet Take 1 tablet by mouth once daily.       benzonatate (TESSALON PERLES) 100 MG capsule Take 2 capsules (200 mg total) by mouth 3 (three) times daily as needed for Cough. 20 capsule 3    carvedilol (COREG) 25 MG tablet Take 2 tablets (50 mg total) by mouth 2 (two) times daily. 360 tablet 3    chlorthalidone (HYGROTEN) 25 MG Tab TAKE 1 TABLET (25 MG TOTAL) BY MOUTH ONCE DAILY. 90 tablet 0    cholecalciferol, vitamin D3, (VITAMIN D3) 2,000 unit Tab Take by mouth once daily.      CYANOCOBALAMIN, VITAMIN B-12, (B-12 DOTS ORAL) Take 1 tablet by mouth once daily.      fluticasone-salmeterol 250-50 mcg/dose (ADVAIR) 250-50 mcg/dose diskus inhaler Inhale 1 puff into the lungs 2 (two) times daily. This is a change from one month 180 each 3    glimepiride (AMARYL) 2 MG tablet TAKE 1 TABLET BY MOUTH daily 90 tablet 3    hydrALAZINE (APRESOLINE) 100 MG tablet TAKE 1 TABLET (100 MG TOTAL) BY MOUTH 3 (THREE) TIMES DAILY. 90 tablet 6    irbesartan (AVAPRO) 300 MG tablet Take 1 tablet (300 mg total) by mouth every evening. 90 tablet 3    levocetirizine (XYZAL) 5 MG tablet Take 1 tablet (5 mg total) by mouth every evening. 30 tablet 11    linagliptin (TRADJENTA) 5 mg Tab tablet Take 1 tablet (5 mg total) by mouth once daily. 90 tablet 3    magnesium oxide (MAG-OX) 400 mg tablet Take 1 tablet (400 mg total) by mouth once daily.  0    metFORMIN (GLUCOPHAGE) 500 MG tablet Take 2 tablets (1,000 mg total) by mouth 2 (two) times daily with meals. 360 tablet 3    omega-3  "fatty acids (FISH OIL) 500 mg Cap Take 1 capsule by mouth Twice daily.      simvastatin (ZOCOR) 20 MG tablet TAKE 1 TABLET (20 MG TOTAL) BY MOUTH EVERY EVENING. 90 tablet 3    ACCU-CHEK HECTOR CONTROL SOLN Soln       ACCU-CHEK HECTOR PLUS METER Misc       azelastine (ASTELIN) 137 mcg (0.1 %) nasal spray 1 spray (137 mcg total) by Nasal route 2 (two) times daily. For severe allergy 30 mL 1    blood sugar diagnostic (ACCU-CHEK HECTOR) Strp Uses Accu-Check Hector meter to test BG 4x/day (Patient taking differently: Uses Accu-Check Hector meter to test BG 4x/day checking 2x day) 400 strip 3    fluticasone (FLONASE) 50 mcg/actuation nasal spray 2 sprays (100 mcg total) by Each Nare route once daily. 1 Bottle 12    lancets (ACCU-CHEK SOFTCLIX LANCETS) Misc Uses Accu-Chek Hector meter to test BG 4x/day 400 each 3    lancets 30 gauge Misc       lancing device Misc       nitroGLYCERIN (NITROSTAT) 0.4 MG SL tablet Place 1 tablet under the tongue continuous prn.       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    FRACTURE SURGERY      left elbow and wrist as a child    HYSTERECTOMY      MASTECTOMY Right     SQUAMOUS CELL CARCINOMA EXCISION      remved from forehead    TONSILLECTOMY         Social History     Socioeconomic History    Marital status:      Spouse name: Not on file    Number of children: Not on file    Years of education: Not on file    Highest education level: Not on file "   Social Needs    Financial resource strain: Not on file    Food insecurity - worry: Not on file    Food insecurity - inability: Not on file    Transportation needs - medical: Not on file    Transportation needs - non-medical: Not on file   Occupational History    Occupation: Homemaker     Employer: OTHER   Tobacco Use    Smoking status: Never Smoker    Smokeless tobacco: Never Used   Substance and Sexual Activity    Alcohol use: No     Alcohol/week: 0.0 oz    Drug use: No    Sexual activity: Yes     Partners: Male   Other Topics Concern    Are you pregnant or think you may be? No    Breast-feeding No   Social History Narrative    Not on file         CBC: No results for input(s): WBC, RBC, HGB, HCT, PLT, MCV, MCH, MCHC in the last 72 hours.    CMP: No results for input(s): NA, K, CL, CO2, BUN, CREATININE, GLU, MG, PHOS, CALCIUM, ALBUMIN, PROT, ALKPHOS, ALT, AST, BILITOT in the last 72 hours.    INR  No results for input(s): PT, INR, PROTIME, APTT in the last 72 hours.        Diagnostic Studies:      EKD Echo:  Results for orders placed or performed during the hospital encounter of 16   2D echo with color flow doppler   Result Value Ref Range    QEF 50 55 - 65    Diastolic Dysfunction Yes (A)     Est. PA Systolic Pressure 42 (A)     Tricuspid Valve Regurgitation TRIVIAL TO MILD          Anesthesia Evaluation    I have reviewed the Patient Summary Reports.     I have reviewed the Medications.     Review of Systems  Anesthesia Hx:  History of prior surgery of interest to airway management or planning: Denies Family Hx of Anesthesia complications.   Denies Personal Hx of Anesthesia complications.       Physical Exam  General:  Obesity    Airway/Jaw/Neck:  Airway Findings: Mouth Opening: Normal Tongue: Normal  General Airway Assessment: Adult  Mallampati: II  TM Distance: Normal, at least 6 cm  Jaw/Neck Findings:  Neck ROM: Normal ROM      Dental:  Dental Findings: In tact    Chest/Lungs:  Chest/Lungs Findings: Normal Respiratory Rate, Clear to auscultation         Mental Status:  Mental Status Findings:  Cooperative, Alert and Oriented         Anesthesia Plan  Type of Anesthesia, risks & benefits discussed:  Anesthesia Type:  general  Patient's Preference:   Intra-op Monitoring Plan: standard ASA monitors  Intra-op Monitoring Plan Comments:   Post Op Pain Control Plan: multimodal analgesia  Post Op Pain Control Plan Comments:   Induction:   IV  Beta Blocker:  Patient is on a Beta-Blocker and has received one dose within the past 24 hours (No further documentation required).       Informed Consent: Patient understands risks and agrees with Anesthesia plan.  Questions answered. Anesthesia consent signed with patient.  ASA Score: 3     Day of Surgery Review of History & Physical:    H&P update referred to the surgeon.         Ready For Surgery From Anesthesia Perspective.

## 2018-12-17 NOTE — H&P
Subjective:    Patient ID:  Myesha Quinones is a 78 y.o. female who presents for elective generator  change.        HPI     77 yo female with h/o NICM, HtN, LBBB, CAD is here for a elective gen change as her battery is at DONNA.   Feeling well.  Denies syncope, palpitations, dizziness.  Notes dizziness which she has attributed to her asthma.  She has mild VIRGILIO today.     EKG today : AsBivp rhythm @78.       Prior history:  ========================  Medtronic CRT-D implanted 2007 >> EF  normalized.   Has done well for extended period.   12/17/12 had an episode of VT >> ATP unsuccessful, shock resulted in Type II break. She had presyncope >> then had shock.  We elected to defer addition of anti-arrhythmic therapy, as this was first shock, EF was normal.  Generator change 3/13.      Diagnostics:  ===============================  Device interrogation: 8/18  MEDTRONIC [id number] Protecta XT CRT-D   Have revealed stable function of leads, 100% biventricular pacing, no significant arrhythmias,   Battery at  DONNA .     Last EF evaluation 11/16: Spect :   The perfusion scan is free of evidence for myocardial ischemia or injury.   2. Resting wall motion is physiologic.   3. Resting LV function is normal.  (normal is >= 51%)  4. The ventricular volumes are normal at rest and stress. \    Review of Systems   Constitution: Negative. Negative for weakness and malaise/fatigue.   Cardiovascular: Positive for dyspnea on exertion. Negative for chest pain, irregular heartbeat, leg swelling, near-syncope, orthopnea, palpitations, paroxysmal nocturnal dyspnea and syncope.   Respiratory: Positive for shortness of breath. Negative for cough.    Neurological: Negative for dizziness and light-headedness.   All other systems reviewed and are negative.       Objective:    Physical Exam   Constitutional: She is oriented to person, place, and time. She appears well-developed and well-nourished.   Eyes: Conjunctivae are normal. No scleral icterus.    Neck: No JVD present. No tracheal deviation present.   Cardiovascular: Normal rate and regular rhythm. PMI is not displaced.   Pulmonary/Chest: Effort normal and breath sounds normal. No respiratory distress.   Abdominal: Soft. There is no hepatosplenomegaly. There is no tenderness.   Musculoskeletal: She exhibits no edema or tenderness.   Neurological: She is alert and oriented to person, place, and time.   Skin: Skin is warm and dry. No rash noted.   Psychiatric: She has a normal mood and affect. Her behavior is normal.         Assessment:       1. ICD (implantable cardioverter-defibrillator) battery depletion         Plan:       We discussed the procedural details, risks and benefits of the generator change with the patient, including the risk of infection, pocket hematoma, lead dislodgement, lead malfunction amongst others.   Patient  appeared to understand the whole discussion and verbalized that all questions were answered to satisfaction. After deliberating over the options, explanation, and risks/ benefits of the procedure, patient wishes to move forward with the procedure. .    She has an iodine allergy , but we donot intend to to use contrast.

## 2018-12-17 NOTE — Clinical Note
RA: Pwave - 2.0 mV, Impedence- 342, Threshold - 0.75 V @ 0.4 ms  RV: Rwave- 10.9 mV, Impedence 380, HVB impedence 45, HVX impedence 62, Threshold - 1 V @ 0.4 ms  LV: Impedence 532, Threshold - 1 V @ 0.4 ms

## 2018-12-17 NOTE — DISCHARGE SUMMARY
[facility]  Discharge Summary      Admit Date: 12/17/2018    Discharge Date and Time:  12/17/2018 5:01 PM    Attending Physician: Jan Mckeon MD     Reason for Admission: gen change     Procedures Performed: Procedure(s) (LRB):  REPLACEMENT, ICD GENERATOR (N/A)  Revision, Skin Pocket, For Cardioverter-Defibrillator    Hospital Course     Patient is s/p Medtronic  generator change :  Tolerated procedure well. No acute complication noted.  Post op care per protocol.  No events on recovery and  on tele   NO HEPARIN PRODUCTS  Keflex 500 mg TID for 5 days at discharge  No lifting over 5 pounds  CXR and ekg post procedure reviewed.   FU with cpc regarding dosing of antihypertensives.   Do not let beam of shower/water hit site directly and no scrubbing in area  Follow up in device clinic in 1 week and with Ep clinic  in 3 months.        Final Diagnoses:    Principal Problem: <principal problem not specified>   Secondary Diagnoses:   Active Hospital Problems    Diagnosis  POA    ICD (implantable cardioverter-defibrillator) battery depletion [Z45.02]  Not Applicable      Resolved Hospital Problems   No resolved problems to display.       Discharged Condition: stable    Disposition: Home or Self Care    Follow Up/Patient Instructions:     Medications:  Reconciled Home Medications:      Medication List      START taking these medications    cephALEXin 500 MG capsule  Commonly known as:  KEFLEX  Take 1 capsule (500 mg total) by mouth every 8 (eight) hours.        CHANGE how you take these medications    blood sugar diagnostic Strp  Commonly known as:  ACCU-CHEK HECTOR  Uses Accu-Check Hector meter to test BG 4x/day  What changed:  additional instructions     irbesartan 300 MG tablet  Commonly known as:  AVAPRO  Take 1 tablet (300 mg total) by mouth every evening.  Start taking on:  12/20/2018  What changed:  These instructions start on 12/20/2018. If you are unsure what to do until then, ask your doctor or  other care provider.     metFORMIN 500 MG tablet  Commonly known as:  GLUCOPHAGE  Take 2 tablets (1,000 mg total) by mouth 2 (two) times daily with meals.  Start taking on:  12/20/2018  What changed:  These instructions start on 12/20/2018. If you are unsure what to do until then, ask your doctor or other care provider.        CONTINUE taking these medications    ACCU-CHEK HECTOR CONTROL SOLN Soln  Generic drug:  blood glucose control high,low     ACCU-CHEK HECTOR PLUS METER Misc  Generic drug:  blood-glucose meter     albuterol 90 mcg/actuation inhaler  Commonly known as:  PROVENTIL/VENTOLIN HFA  Inhale 2 puffs into the lungs every 6 (six) hours as needed for Wheezing. Rescue     albuterol-ipratropium 2.5 mg-0.5 mg/3 mL nebulizer solution  Commonly known as:  DUO-NEB  TAKE 1 VIAL BY NEBULIZATION EVERY 4 (FOUR) HOURS. RESCUE     B-12 DOTS ORAL  Take 1 tablet by mouth once daily.     benzonatate 100 MG capsule  Commonly known as:  TESSALON PERLES  Take 2 capsules (200 mg total) by mouth 3 (three) times daily as needed for Cough.     carvedilol 25 MG tablet  Commonly known as:  COREG  Take 2 tablets (50 mg total) by mouth 2 (two) times daily.     chlorthalidone 25 MG Tab  Commonly known as:  HYGROTEN  TAKE 1 TABLET (25 MG TOTAL) BY MOUTH ONCE DAILY.     cloNIDine 0.1 MG tablet  Commonly known as:  CATAPRES  TAKE 1 TABLET (0.1 MG TOTAL) BY MOUTH EVERY EVENING.     ENTERIC COATED ASPIRIN 81 MG EC tablet  Generic drug:  aspirin  Take 1 tablet by mouth once daily.     fluticasone 50 mcg/actuation nasal spray  Commonly known as:  FLONASE  2 sprays (100 mcg total) by Each Nare route once daily.     fluticasone-salmeterol 250-50 mcg/dose 250-50 mcg/dose diskus inhaler  Commonly known as:  ADVAIR  Inhale 1 puff into the lungs 2 (two) times daily. This is a change from one month     glimepiride 2 MG tablet  Commonly known as:  AMARYL  TAKE 1 TABLET BY MOUTH daily     hydrALAZINE 100 MG tablet  Commonly known as:   "APRESOLINE  TAKE 1 TABLET (100 MG TOTAL) BY MOUTH 3 (THREE) TIMES DAILY.     insulin glargine 100 units/mL (3mL) SubQ pen  Commonly known as:  LANTUS SOLOSTAR U-100 INSULIN  Inject 30 Units into the skin every evening.     levocetirizine 5 MG tablet  Commonly known as:  XYZAL  Take 1 tablet (5 mg total) by mouth every evening.     linagliptin 5 mg Tab tablet  Commonly known as:  TRADJENTA  Take 1 tablet (5 mg total) by mouth once daily.     magnesium oxide 400 mg (241.3 mg magnesium) tablet  Commonly known as:  MAG-OX  Take 1 tablet (400 mg total) by mouth once daily.     nitroGLYCERIN 0.4 MG SL tablet  Commonly known as:  NITROSTAT  Place 1 tablet under the tongue continuous prn.     simvastatin 20 MG tablet  Commonly known as:  ZOCOR  TAKE 1 TABLET (20 MG TOTAL) BY MOUTH EVERY EVENING.     VITAMIN D3 2,000 unit Tab  Generic drug:  cholecalciferol (vitamin D3)  Take by mouth once daily.        ASK your doctor about these medications    azelastine 137 mcg (0.1 %) nasal spray  Commonly known as:  ASTELIN  1 spray (137 mcg total) by Nasal route 2 (two) times daily. For severe allergy     FISH  mg Cap  Generic drug:  omega-3 fatty acids  Take 1 capsule by mouth Twice daily.     * lancets Misc  Commonly known as:  ACCU-CHEK SOFTCLIX LANCETS  Uses Accu-Chek Angelica meter to test BG 4x/day     * lancets 30 gauge Misc     lancing device Misc     pen needle, diabetic 31 gauge x 3/16" Ndle  Commonly known as:  BD ULTRA-FINE MINI PEN NEEDLE  USE WITH LANTUS AT BEDTIME         * This list has 2 medication(s) that are the same as other medications prescribed for you. Read the directions carefully, and ask your doctor or other care provider to review them with you.              Discharge Procedure Orders   X-Ray Chest PA And Lateral   Standing Status: Future Standing Exp. Date: 12/17/19     Order Specific Question Answer Comments   Reason for Exam: post generator change    May the Radiologist modify the order per protocol " to meet the clinical needs of the patient? Yes      Diet Cardiac     No driving until:   Order Comments: No driving Today  No lifting for 7 days     Notify your health care provider if you experience any of the following:  temperature >100.4     Notify your health care provider if you experience any of the following:  persistent nausea and vomiting or diarrhea     Notify your health care provider if you experience any of the following:  severe uncontrolled pain     Notify your health care provider if you experience any of the following:  redness, tenderness, or signs of infection (pain, swelling, redness, odor or green/yellow discharge around incision site)     Notify your health care provider if you experience any of the following:  difficulty breathing or increased cough     Notify your health care provider if you experience any of the following:  severe persistent headache     Notify your health care provider if you experience any of the following:  worsening rash     Notify your health care provider if you experience any of the following:  persistent dizziness, light-headedness, or visual disturbances     Notify your health care provider if you experience any of the following:  increased confusion or weakness       Leave dressing on - Keep it clean, dry, and intact until clinic visit     Follow-up Information     Nagi Aggarwal - Arrhythmia In 1 week.    Specialty:  Cardiology  Why:  wound care  Contact information:  [address]  [facility] [address] [phone]  Additional information:  Clinic [place] - 3rd Floor           Jan Mckeon MD In 3 months.    Specialties:  Electrophysiology, Cardiology  Why:  post gen change   Contact information:  [address]  [facility] [id number] [phone]

## 2018-12-17 NOTE — TRANSFER OF CARE
"Anesthesia Transfer of Care Note    Patient: Myesha Quinones    Procedure(s) Performed: Procedure(s) (LRB):  REPLACEMENT, ICD GENERATOR (N/A)  Revision, Skin Pocket, For Cardioverter-Defibrillator    Patient location: PACU    Anesthesia Type: general    Transport from OR: Transported from OR on 6-10 L/min O2 by face mask with adequate spontaneous ventilation    Post pain: adequate analgesia    Post assessment: no apparent anesthetic complications and tolerated procedure well    Post vital signs: stable    Level of consciousness: awake, alert and oriented    Nausea/Vomiting: no nausea/vomiting    Complications: none    Transfer of care protocol was followed      Last vitals:   Visit Vitals  BP (!) 117/59 (BP Location: Left arm, Patient Position: Lying)   Pulse 74   Temp 35.8 °C (96.4 °F) (Oral)   Resp 18   Ht 5' 3" (1.6 m)   Wt 71.2 kg (157 lb)   SpO2 (!) 94%   Breastfeeding? No   BMI 27.81 kg/m²     "

## 2018-12-17 NOTE — Clinical Note
Generator Pocket opened at the left  upper chest  with blunt dissection, electrocautery, plasma blade and sharp dissection.

## 2018-12-18 DIAGNOSIS — I42.8 NONISCHEMIC CARDIOMYOPATHY: ICD-10-CM

## 2018-12-18 DIAGNOSIS — Z95.810 ICD (IMPLANTABLE CARDIOVERTER-DEFIBRILLATOR) IN PLACE: Primary | ICD-10-CM

## 2018-12-18 NOTE — ANESTHESIA POSTPROCEDURE EVALUATION
"Anesthesia Post Evaluation    Patient: Myesha Quinones    Procedure(s) Performed: Procedure(s) (LRB):  REPLACEMENT, ICD GENERATOR (N/A)  Revision, Skin Pocket, For Cardioverter-Defibrillator    Final Anesthesia Type: general  Patient location during evaluation: PACU  Patient participation: Yes- Able to Participate  Level of consciousness: awake and alert and oriented  Post-procedure vital signs: reviewed and stable  Pain management: adequate  Airway patency: patent  PONV status at discharge: No PONV  Anesthetic complications: no      Cardiovascular status: hemodynamically stable  Respiratory status: unassisted  Hydration status: euvolemic  Follow-up not needed.        Visit Vitals  BP (!) 170/76 (BP Location: Left arm, Patient Position: Sitting)   Pulse 72   Temp 36.6 °C (97.9 °F)   Resp 18   Ht 5' 3" (1.6 m)   Wt 71.2 kg (157 lb)   SpO2 95%   Breastfeeding? No   BMI 27.81 kg/m²       Pain/Juan Diego Score: No Data Recorded      "

## 2018-12-19 ENCOUNTER — LAB VISIT (OUTPATIENT)
Dept: LAB | Facility: HOSPITAL | Age: 78
End: 2018-12-19
Attending: INTERNAL MEDICINE
Payer: MEDICARE

## 2018-12-19 ENCOUNTER — OFFICE VISIT (OUTPATIENT)
Dept: INTERNAL MEDICINE | Facility: CLINIC | Age: 78
End: 2018-12-19
Payer: MEDICARE

## 2018-12-19 VITALS
HEART RATE: 80 BPM | SYSTOLIC BLOOD PRESSURE: 149 MMHG | WEIGHT: 158.5 LBS | OXYGEN SATURATION: 98 % | BODY MASS INDEX: 28.08 KG/M2 | HEIGHT: 63 IN | DIASTOLIC BLOOD PRESSURE: 67 MMHG

## 2018-12-19 DIAGNOSIS — J45.20 MILD INTERMITTENT CHRONIC ASTHMA WITHOUT COMPLICATION: ICD-10-CM

## 2018-12-19 DIAGNOSIS — E11.22 CKD STAGE 3 DUE TO TYPE 2 DIABETES MELLITUS: ICD-10-CM

## 2018-12-19 DIAGNOSIS — Z79.4 TYPE 2 DIABETES MELLITUS WITH STAGE 3 CHRONIC KIDNEY DISEASE, WITH LONG-TERM CURRENT USE OF INSULIN: Primary | ICD-10-CM

## 2018-12-19 DIAGNOSIS — E04.2 NONTOXIC MULTINODULAR GOITER: ICD-10-CM

## 2018-12-19 DIAGNOSIS — N18.30 CKD STAGE 3 DUE TO TYPE 2 DIABETES MELLITUS: ICD-10-CM

## 2018-12-19 DIAGNOSIS — E11.22 TYPE 2 DIABETES MELLITUS WITH STAGE 3 CHRONIC KIDNEY DISEASE, WITH LONG-TERM CURRENT USE OF INSULIN: ICD-10-CM

## 2018-12-19 DIAGNOSIS — N18.30 TYPE 2 DIABETES MELLITUS WITH STAGE 3 CHRONIC KIDNEY DISEASE, WITH LONG-TERM CURRENT USE OF INSULIN: ICD-10-CM

## 2018-12-19 DIAGNOSIS — E78.00 PURE HYPERCHOLESTEROLEMIA: ICD-10-CM

## 2018-12-19 DIAGNOSIS — E11.22 TYPE 2 DIABETES MELLITUS WITH STAGE 3 CHRONIC KIDNEY DISEASE, WITH LONG-TERM CURRENT USE OF INSULIN: Primary | ICD-10-CM

## 2018-12-19 DIAGNOSIS — Z79.4 TYPE 2 DIABETES MELLITUS WITH STAGE 3 CHRONIC KIDNEY DISEASE, WITH LONG-TERM CURRENT USE OF INSULIN: ICD-10-CM

## 2018-12-19 DIAGNOSIS — N18.30 TYPE 2 DIABETES MELLITUS WITH STAGE 3 CHRONIC KIDNEY DISEASE, WITH LONG-TERM CURRENT USE OF INSULIN: Primary | ICD-10-CM

## 2018-12-19 DIAGNOSIS — N18.30 CHRONIC KIDNEY DISEASE, STAGE 3, MOD DECREASED GFR: ICD-10-CM

## 2018-12-19 DIAGNOSIS — I10 ESSENTIAL HYPERTENSION: ICD-10-CM

## 2018-12-19 LAB
ALBUMIN SERPL BCP-MCNC: 3.9 G/DL
ALP SERPL-CCNC: 77 U/L
ALT SERPL W/O P-5'-P-CCNC: 18 U/L
ANION GAP SERPL CALC-SCNC: 10 MMOL/L
AST SERPL-CCNC: 17 U/L
BASOPHILS # BLD AUTO: 0.04 K/UL
BASOPHILS NFR BLD: 0.4 %
BILIRUB SERPL-MCNC: 0.4 MG/DL
BUN SERPL-MCNC: 42 MG/DL
CALCIUM SERPL-MCNC: 9.7 MG/DL
CHLORIDE SERPL-SCNC: 101 MMOL/L
CHOLEST SERPL-MCNC: 112 MG/DL
CHOLEST/HDLC SERPL: 2.3 {RATIO}
CO2 SERPL-SCNC: 25 MMOL/L
CREAT SERPL-MCNC: 1.5 MG/DL
DIFFERENTIAL METHOD: ABNORMAL
EOSINOPHIL # BLD AUTO: 0.9 K/UL
EOSINOPHIL NFR BLD: 8.7 %
ERYTHROCYTE [DISTWIDTH] IN BLOOD BY AUTOMATED COUNT: 13.1 %
EST. GFR  (AFRICAN AMERICAN): 38 ML/MIN/1.73 M^2
EST. GFR  (NON AFRICAN AMERICAN): 33 ML/MIN/1.73 M^2
ESTIMATED AVG GLUCOSE: 157 MG/DL
GLUCOSE SERPL-MCNC: 249 MG/DL
HBA1C MFR BLD HPLC: 7.1 %
HCT VFR BLD AUTO: 35.7 %
HDLC SERPL-MCNC: 49 MG/DL
HDLC SERPL: 43.8 %
HGB BLD-MCNC: 11.1 G/DL
LDLC SERPL CALC-MCNC: 43.4 MG/DL
LYMPHOCYTES # BLD AUTO: 1.7 K/UL
LYMPHOCYTES NFR BLD: 15.9 %
MCH RBC QN AUTO: 29 PG
MCHC RBC AUTO-ENTMCNC: 31.1 G/DL
MCV RBC AUTO: 93 FL
MONOCYTES # BLD AUTO: 1 K/UL
MONOCYTES NFR BLD: 8.8 %
NEUTROPHILS # BLD AUTO: 7 K/UL
NEUTROPHILS NFR BLD: 65.5 %
NONHDLC SERPL-MCNC: 63 MG/DL
PLATELET # BLD AUTO: 221 K/UL
PMV BLD AUTO: 10.9 FL
POTASSIUM SERPL-SCNC: 4.2 MMOL/L
PROT SERPL-MCNC: 7.5 G/DL
RBC # BLD AUTO: 3.83 M/UL
SODIUM SERPL-SCNC: 136 MMOL/L
TRIGL SERPL-MCNC: 98 MG/DL
TSH SERPL DL<=0.005 MIU/L-ACNC: 1.13 UIU/ML
WBC # BLD AUTO: 10.75 K/UL

## 2018-12-19 PROCEDURE — 80061 LIPID PANEL: CPT | Mod: HCNC

## 2018-12-19 PROCEDURE — 36415 COLL VENOUS BLD VENIPUNCTURE: CPT | Mod: HCNC

## 2018-12-19 PROCEDURE — 1101F PT FALLS ASSESS-DOCD LE1/YR: CPT | Mod: CPTII,HCNC,S$GLB, | Performed by: INTERNAL MEDICINE

## 2018-12-19 PROCEDURE — 99999 PR PBB SHADOW E&M-EST. PATIENT-LVL IV: CPT | Mod: PBBFAC,HCNC,, | Performed by: INTERNAL MEDICINE

## 2018-12-19 PROCEDURE — 99215 OFFICE O/P EST HI 40 MIN: CPT | Mod: HCNC,S$GLB,, | Performed by: INTERNAL MEDICINE

## 2018-12-19 PROCEDURE — 84443 ASSAY THYROID STIM HORMONE: CPT | Mod: HCNC

## 2018-12-19 PROCEDURE — 80053 COMPREHEN METABOLIC PANEL: CPT | Mod: HCNC

## 2018-12-19 PROCEDURE — 83036 HEMOGLOBIN GLYCOSYLATED A1C: CPT | Mod: HCNC

## 2018-12-19 PROCEDURE — 85025 COMPLETE CBC W/AUTO DIFF WBC: CPT | Mod: HCNC

## 2018-12-19 PROCEDURE — 3078F DIAST BP <80 MM HG: CPT | Mod: CPTII,HCNC,S$GLB, | Performed by: INTERNAL MEDICINE

## 2018-12-19 PROCEDURE — 3077F SYST BP >= 140 MM HG: CPT | Mod: CPTII,HCNC,S$GLB, | Performed by: INTERNAL MEDICINE

## 2018-12-19 NOTE — PROGRESS NOTES
Subjective:       Patient ID Myesha Quinones is a 78 y.o. female    Chief Complaint: Follow-up        HPI:     New Problems:   Recent pacemaker/defibrillator change  Patient was told that her GFR has greatly decreased  Dr. Fernando added gagan medication    Patient was told that until Friday she was to remain off the metformin and irbesartan  HPI    Chronic Problems :  Hypertension : Patient is taking medications as instructed, no medication side effects noted no TIA's,no chest pain on exertion,no dyspnea on exertion,no swelling  Ankles.Her BP is better on the [facility] but not at goal   ..  Hyperlipidemia : Patient is taking medications as instructed, no medication side effects noted, no TIA's, chest pain on exertion, no muscle pain. There are no new concerns.    Patient is on a diabetes insulin program: she has 5 days left    Diabetes Management Status    Statin: Taking  ACE/ARB: Taking    Screening or Prevention Patient's value Goal Complete/Controlled?   HgA1C Testing and Control   Lab Results   Component Value Date    HGBA1C 6.8 (H) 08/21/2018      Annually/Less than 8% Yes   Lipid profile : 08/21/2018 Annually Yes   LDL control Lab Results   Component Value Date    LDLCALC 47.2 (L) 08/21/2018    Annually/Less than 100 mg/dl  Yes   Nephropathy screening Lab Results   Component Value Date    LABMICR 110.0 05/23/2017     Lab Results   Component Value Date    PROTEINUA 2+ (A) 06/06/2018    Annually Yes   Blood pressure BP Readings from Last 1 Encounters:   12/19/18 (!) 149/67    Less than 140/90 No   Dilated retinal exam : 12/13/2018 Annually Yes   Foot exam   : 10/12/2017 Annually No     Use of metformin:  50% reduction of metformin based on GFR 30-45.    Check the sugars daily  .  Patient Active Problem List   Diagnosis    History of nonmelanoma skin cancer    Nonischemic cardiomyopathy    LBBB (left bundle branch block)    Chronic systolic heart failure    Nontoxic multinodular goiter     Meningioma    Asthma, chronic    Biventricular ICD (implantable cardioverter-defibrillator) in place    Tremor    Coronary artery disease involving native coronary artery without angina pectoris - Non-obstructive 50% LAD    Essential hypertension    Hyperlipidemia    History of breast cancer    Adrenal cortical nodule: repeat CT 6/2016    Calcification of aorta    Diabetic polyneuropathy associated with type 2 diabetes mellitus    Postinflammatory pulmonary fibrosis    Osteoporosis    KHOA (obstructive sleep apnea)    Type 2 diabetes mellitus with stage 3 chronic kidney disease, with long-term current use of insulin    Chronic kidney disease, stage 3, mod decreased GFR    Iron deficiency anemia    Chemotherapy-induced neuropathy    Hypomagnesemia    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, with long-term current use of insulin    Hypertensive retinopathy, bilateral    Multiple lung nodules    COPD (chronic obstructive pulmonary disease)    Mixed conductive and sensorineural hearing loss    Cerebral meningioma    ICD (implantable cardioverter-defibrillator) battery depletion     Past Medical History:   Diagnosis Date    Allergy     Asthma     Basal cell carcinoma     left forehead    Basal cell carcinoma     left nose    Basal cell carcinoma 05/20/2015    right nose    Basal cell carcinoma 12/22/2015    left lower post neck    Breast cancer     CAD (coronary artery disease)     Cardiomyopathy     Cardiomyopathy, ischemic     Cataract     CHF (congestive heart failure)     Chronic kidney disease, stage 3, mod decreased GFR 2/14/2017    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, with long-term current use of insulin 2/22/2018    COPD (chronic obstructive pulmonary disease)     COPD exacerbation 4/8/2018    Defibrillator discharge      Diabetes mellitus     Diabetes mellitus type II     Diabetes with neurologic complications     Goiter     MNG    HX: breast cancer     Hyperlipidemia     Hypertension     Iron deficiency anemia 5/16/2017    Left kidney mass     Meningioma     Osteoporosis, postmenopausal     Pacemaker     Skin cancer     s/p excision    Sleep apnea     CPAP    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/2015    mid forehead    Unspecified vitamin D deficiency     Ventricular tachycardia     Vitamin B12 deficiency     Vitamin D deficiency disease      Past Surgical History:   Procedure Laterality Date    BASAL CELL CARCINOMA EXCISION      posterior neck and nose    BREAST BIOPSY      BREAST CYST EXCISION Left     BREAST SURGERY      CARDIAC DEFIBRILLATOR PLACEMENT      x 2    CATARACT EXTRACTION W/  INTRAOCULAR LENS IMPLANT Bilateral     CHOLECYSTECTOMY      Colonoscopy  N/A 8/27/2014    Performed by Leonidas Velasquez MD at [facility] ENDO (4TH FLR)    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    FRACTURE SURGERY      left elbow and wrist as a child    HYSTERECTOMY      MASTECTOMY Right     REPLACEMENT OF IMPLANTABLE CARDIOVERTER-DEFIBRILLATOR (ICD) GENERATOR N/A 12/17/2018    Procedure: REPLACEMENT, ICD GENERATOR;  Surgeon: Jan Mckeon MD;  Location: [facility] EP LAB;  Service: Cardiology;  Laterality: N/A;  DONNA, CRT-D gen change, MDT, MAC, SK, 3 Prep    REPLACEMENT, ICD GENERATOR N/A 12/17/2018    Performed by Jan Mckeon MD at [facility] EP LAB    REVISION OF SKIN POCKET FOR CARDIOVERTER-DEFIBRILLATOR  12/17/2018    Procedure: Revision, Skin Pocket, For Cardioverter-Defibrillator;  Surgeon: Jan Mckeon MD;  Location: [facility] EP LAB;  Service: Cardiology;;    Revision, Skin Pocket, For Cardioverter-Defibrillator  12/17/2018    Performed by Jan Mckeon MD at [facility] EP LAB    SQUAMOUS CELL CARCINOMA EXCISION      remved from forehead    TONSILLECTOMY       Family History   Problem Relation Age of Onset    Diabetes Father  "    Heart disease Father     Diabetes Sister     Heart disease Sister     Diabetes Brother     Heart disease Brother     Hypertension Brother     Diabetes Brother     Heart disease Brother     Hypertension Brother     Diabetes Brother     Heart disease Brother     Cancer Brother         colon    Diabetes Brother     Cancer Son         skin    Diabetes Son         prediabetes    Diabetes Daughter         prediabetes    Cancer Daughter         melanoma    Obesity Daughter     Melanoma Daughter     Diabetes Son     Asthma Mother     Hypertension Mother     Stroke Mother     No Known Problems Maternal Grandmother     No Known Problems Maternal Grandfather     No Known Problems Paternal Grandmother     No Known Problems Paternal Grandfather     Amblyopia Neg Hx     Blindness Neg Hx     Cataracts Neg Hx     Glaucoma Neg Hx     Macular degeneration Neg Hx     Retinal detachment Neg Hx     Strabismus Neg Hx     Thyroid disease Neg Hx      Review of Systems   Constitutional: Negative.  Negative for weight loss.   Respiratory: Negative for cough, sputum production and shortness of breath.    Cardiovascular: Negative for chest pain, palpitations and leg swelling.   Gastrointestinal: Negative for abdominal pain and blood in stool.   Genitourinary: Negative for dysuria.   Musculoskeletal: Negative for back pain and falls.   Skin: Negative for itching and rash.   Neurological: Negative for weakness.         Objective:     Vitals:    12/19/18 0704 12/19/18 0713   BP: (!) 168/88 (!) 149/67   Pulse: 80    SpO2: 98%    Weight: 71.9 kg (158 lb 8.2 oz)    Height: 5' 3" (1.6 m)    PainSc: 0-No pain      Body mass index is 28.08 kg/m².  Physical Exam   Constitutional: She is oriented to person, place, and time and well-developed, well-nourished, and in no distress.   HENT:   Head: Normocephalic and atraumatic.   Eyes: Conjunctivae and EOM are normal. Pupils are equal, round, and reactive to light. "   Neck: Normal range of motion. Neck supple. No thyromegaly present.   Cardiovascular: Normal rate, regular rhythm, normal heart sounds and intact distal pulses.   Pulmonary/Chest: Effort normal and breath sounds normal. No respiratory distress. She exhibits no tenderness.   Abdominal: Soft. Bowel sounds are normal. There is no tenderness.   Neurological: She is oriented to person, place, and time.       Assessment:     1. Type 2 diabetes mellitus with stage 3 chronic kidney disease, with long-term current use of insulin    2. CKD stage 3 due to type 2 diabetes mellitus    3. Mild intermittent chronic asthma without complication    4. Chronic kidney disease, stage 3, mod decreased GFR    5. Essential hypertension    6. Pure hypercholesterolemia    7. Nontoxic multinodular goiter        Plan:     Problem List Items Addressed This Visit     Nontoxic multinodular goiter    Relevant Orders    TSH    Asthma, chronic    Essential hypertension    Hyperlipidemia    Relevant Orders    Lipid panel    Type 2 diabetes mellitus with stage 3 chronic kidney disease, with long-term current use of insulin - Primary    Relevant Orders    Ambulatory consult to Podiatry    Microalbumin/creatinine urine ratio    Ambulatory consult to Nephrology    CBC auto differential    Comprehensive metabolic panel    Hemoglobin A1c    Chronic kidney disease, stage 3, mod decreased GFR    Overview     Reduced metformin to 50% of previous as patient has GFR 32 ( 30-45) and is currently on insulin           Other Visit Diagnoses     CKD stage 3 due to type 2 diabetes mellitus        Likely medication reduced  Referral to Nephrology    Relevant Orders    Comprehensive metabolic panel        Follow-up in about 2 weeks (around 1/2/2019) for FU for adjustment of insulin.

## 2018-12-21 ENCOUNTER — CLINICAL SUPPORT (OUTPATIENT)
Dept: CARDIOLOGY | Facility: HOSPITAL | Age: 78
End: 2018-12-21
Attending: INTERNAL MEDICINE
Payer: MEDICARE

## 2018-12-21 DIAGNOSIS — I42.8 NONISCHEMIC CARDIOMYOPATHY: ICD-10-CM

## 2018-12-21 DIAGNOSIS — Z95.810 ICD (IMPLANTABLE CARDIOVERTER-DEFIBRILLATOR) IN PLACE: ICD-10-CM

## 2018-12-21 PROCEDURE — 93284 PRGRMG EVAL IMPLANTABLE DFB: CPT | Mod: 26,HCNC,, | Performed by: INTERNAL MEDICINE

## 2018-12-21 PROCEDURE — 93280 PM DEVICE PROGR EVAL DUAL: CPT | Mod: HCNC

## 2019-01-02 ENCOUNTER — OFFICE VISIT (OUTPATIENT)
Dept: INTERNAL MEDICINE | Facility: CLINIC | Age: 79
End: 2019-01-02
Payer: MEDICARE

## 2019-01-02 VITALS
BODY MASS INDEX: 28.36 KG/M2 | SYSTOLIC BLOOD PRESSURE: 122 MMHG | WEIGHT: 160.06 LBS | OXYGEN SATURATION: 96 % | HEART RATE: 88 BPM | HEIGHT: 63 IN | DIASTOLIC BLOOD PRESSURE: 58 MMHG

## 2019-01-02 DIAGNOSIS — T45.1X5A CHEMOTHERAPY-INDUCED NEUROPATHY: ICD-10-CM

## 2019-01-02 DIAGNOSIS — I25.10 CORONARY ARTERY DISEASE INVOLVING NATIVE CORONARY ARTERY OF NATIVE HEART WITHOUT ANGINA PECTORIS: ICD-10-CM

## 2019-01-02 DIAGNOSIS — D32.9 MENINGIOMA: ICD-10-CM

## 2019-01-02 DIAGNOSIS — I50.22 CHRONIC SYSTOLIC HEART FAILURE: ICD-10-CM

## 2019-01-02 DIAGNOSIS — N18.30 CHRONIC KIDNEY DISEASE, STAGE 3, MOD DECREASED GFR: ICD-10-CM

## 2019-01-02 DIAGNOSIS — I42.8 NONISCHEMIC CARDIOMYOPATHY: ICD-10-CM

## 2019-01-02 DIAGNOSIS — I70.0 CALCIFICATION OF AORTA: ICD-10-CM

## 2019-01-02 DIAGNOSIS — J44.9 CHRONIC OBSTRUCTIVE PULMONARY DISEASE, UNSPECIFIED COPD TYPE: ICD-10-CM

## 2019-01-02 DIAGNOSIS — G62.0 CHEMOTHERAPY-INDUCED NEUROPATHY: ICD-10-CM

## 2019-01-02 DIAGNOSIS — E11.22 TYPE 2 DIABETES MELLITUS WITH STAGE 3 CHRONIC KIDNEY DISEASE, WITH LONG-TERM CURRENT USE OF INSULIN: Primary | ICD-10-CM

## 2019-01-02 DIAGNOSIS — Z79.4 TYPE 2 DIABETES MELLITUS WITH STAGE 3 CHRONIC KIDNEY DISEASE, WITH LONG-TERM CURRENT USE OF INSULIN: Primary | ICD-10-CM

## 2019-01-02 DIAGNOSIS — E27.8 ADRENAL CORTICAL NODULE: ICD-10-CM

## 2019-01-02 DIAGNOSIS — N18.30 TYPE 2 DIABETES MELLITUS WITH STAGE 3 CHRONIC KIDNEY DISEASE, WITH LONG-TERM CURRENT USE OF INSULIN: Primary | ICD-10-CM

## 2019-01-02 PROCEDURE — 3078F PR MOST RECENT DIASTOLIC BLOOD PRESSURE < 80 MM HG: ICD-10-PCS | Mod: CPTII,HCNC,S$GLB, | Performed by: INTERNAL MEDICINE

## 2019-01-02 PROCEDURE — 3074F SYST BP LT 130 MM HG: CPT | Mod: CPTII,HCNC,S$GLB, | Performed by: INTERNAL MEDICINE

## 2019-01-02 PROCEDURE — 1101F PT FALLS ASSESS-DOCD LE1/YR: CPT | Mod: CPTII,HCNC,S$GLB, | Performed by: INTERNAL MEDICINE

## 2019-01-02 PROCEDURE — 99999 PR PBB SHADOW E&M-EST. PATIENT-LVL V: CPT | Mod: PBBFAC,HCNC,, | Performed by: INTERNAL MEDICINE

## 2019-01-02 PROCEDURE — 99999 PR PBB SHADOW E&M-EST. PATIENT-LVL V: ICD-10-PCS | Mod: PBBFAC,HCNC,, | Performed by: INTERNAL MEDICINE

## 2019-01-02 PROCEDURE — 3074F PR MOST RECENT SYSTOLIC BLOOD PRESSURE < 130 MM HG: ICD-10-PCS | Mod: CPTII,HCNC,S$GLB, | Performed by: INTERNAL MEDICINE

## 2019-01-02 PROCEDURE — 99214 PR OFFICE/OUTPT VISIT, EST, LEVL IV, 30-39 MIN: ICD-10-PCS | Mod: HCNC,S$GLB,, | Performed by: INTERNAL MEDICINE

## 2019-01-02 PROCEDURE — 99214 OFFICE O/P EST MOD 30 MIN: CPT | Mod: HCNC,S$GLB,, | Performed by: INTERNAL MEDICINE

## 2019-01-02 PROCEDURE — 3078F DIAST BP <80 MM HG: CPT | Mod: CPTII,HCNC,S$GLB, | Performed by: INTERNAL MEDICINE

## 2019-01-02 PROCEDURE — 1101F PR PT FALLS ASSESS DOC 0-1 FALLS W/OUT INJ PAST YR: ICD-10-PCS | Mod: CPTII,HCNC,S$GLB, | Performed by: INTERNAL MEDICINE

## 2019-01-02 PROCEDURE — 99499 RISK ADDL DX/OHS AUDIT: ICD-10-PCS | Mod: HCNC,S$GLB,, | Performed by: INTERNAL MEDICINE

## 2019-01-02 PROCEDURE — 99499 UNLISTED E&M SERVICE: CPT | Mod: HCNC,S$GLB,, | Performed by: INTERNAL MEDICINE

## 2019-01-02 NOTE — ASSESSMENT & PLAN NOTE
Chronic Active Condition:    Monitor- signs, symptoms, disease progression, disease regression: stable no chest pain    Evaluate: test results, medication effectiveness, response to treatment    Assess/Address-ordering tests, discussion , review records, counseling    Treat-medication, therapies, other modalities: same meds and follows with Dr. Fernando

## 2019-01-02 NOTE — ASSESSMENT & PLAN NOTE
Chronic Active Condition:    Monitor- signs, symptoms, disease progression, disease regression: none    Evaluate: test results, medication effectiveness, response to treatment: last  CT was 12/2017 and left adrenal slightly larger but still feel adenoma    Assess/Address-ordering tests, discussion , review records, counseling: none    Treat-medication, therapies, other modalities: none

## 2019-01-02 NOTE — ASSESSMENT & PLAN NOTE
Chronic Active Condition:    Monitor- signs, symptoms, disease progression, disease regression: none    Evaluate: test results, medication effectiveness, response to treatment: none    Assess/Address-ordering tests, discussion , review records, counseling: none    Treat-medication, therapies, other modalities: on asa and statin

## 2019-01-02 NOTE — ASSESSMENT & PLAN NOTE
Chronic Active Condition:    Monitor- signs, symptoms, disease progression, disease regression: no new symptoms    Evaluate: test results, medication effectiveness, response to treatment: good response to medications    Assess/Address-ordering tests, discussion , review records, counseling    Treat-medication, therapies, other modalities: continue following with Dr. Fernando

## 2019-01-02 NOTE — ASSESSMENT & PLAN NOTE
Chronic Active Condition:    Monitor- signs, symptoms, disease progression, disease regression: patient reports same mostly problematic at night, now having trouble with buttoning    Evaluate: test results, medication effectiveness, response to treatment: none    Assess/Address-ordering tests, discussion , review records, counseling: none    Treat-medication, therapies, other modalities: declines med

## 2019-01-02 NOTE — ASSESSMENT & PLAN NOTE
Chronic Active Condition:    Monitor- signs, symptoms, disease progression, disease regression  Worsening GFR while on metformin    Evaluate: test results, medication effectiveness, response to treatment  Lab of 12/2018 GFR 33    Assess/Address-ordering tests, discussion , review records, counseling  Made patient aware of concern    Treat-medication, therapies, other modalities  Decreased metformin by 1/2 of previous dose. Now at metformin 500 mg bid and seeing patient for adjustment of insulin

## 2019-01-02 NOTE — ASSESSMENT & PLAN NOTE
Chronic Active Condition:    Monitor- signs, symptoms, disease progression, disease regression: recent weather worsened symptoms, is using nebulizer by needs new equipment    Evaluate: test results, medication effectiveness, response to treatment: response to nebulizer good    Assess/Address-ordering tests, discussion , review records, counseling    Treat-medication, therapies, other modalities: new equipment needed

## 2019-01-02 NOTE — ASSESSMENT & PLAN NOTE
Chronic Active Condition:    Monitor- signs, symptoms, disease progression, disease regression: none    Evaluate: test results, medication effectiveness, response to treatment: sees Dr. Fernando Cardiology    Assess/Address-ordering tests, discussion , review records, counseling    Treat-medication, therapies, other modalities: no change in medication

## 2019-01-02 NOTE — PROGRESS NOTES
Subjective:      Patient ID: Myesha Quinones is a 78 y.o. female.    Chief Complaint: Follow-up    HPI:  HPI   At the last appt we made adjustments in her metformin because of the GFR and asked that she return for follow up of her glucoses and hypertension. Her blood pressure has been better.    Chronic Problems :  Hypertension : Patient is taking medications as instructed, no medication side effects noted no TIA's,no chest pain on exertion,no dyspnea on exertion,no swelling  ankles.There are no new concerns.      Diabetes Management Status    Statin: Taking  ACE/ARB: Taking    Screening or Prevention Patient's value Goal Complete/Controlled?   HgA1C Testing and Control   Lab Results   Component Value Date    HGBA1C 7.1 (H) 12/19/2018      Annually/Less than 8% Yes   Lipid profile : 12/19/2018 Annually Yes   LDL control Lab Results   Component Value Date    LDLCALC 43.4 (L) 12/19/2018    Annually/Less than 100 mg/dl  Yes   Nephropathy screening Lab Results   Component Value Date    LABMICR 308.0 12/19/2018     Lab Results   Component Value Date    PROTEINUA 2+ (A) 06/06/2018    Annually Yes   Blood pressure BP Readings from Last 1 Encounters:   01/02/19 (!) 122/58    Less than 140/90 Yes   Dilated retinal exam : 12/13/2018 Annually Yes   Foot exam   : 10/12/2017 Annually No     Patient reports she has had to use her home nebulizer more with weather change and it is quite old and likely needs new equipment.  Patient Active Problem List   Diagnosis    History of nonmelanoma skin cancer    Nonischemic cardiomyopathy    LBBB (left bundle branch block)    Chronic systolic heart failure    Nontoxic multinodular goiter    Meningioma    Asthma, chronic    Biventricular ICD (implantable cardioverter-defibrillator) in place    Tremor    Coronary artery disease involving native coronary artery without angina pectoris - Non-obstructive 50% LAD    Essential hypertension    Hyperlipidemia    History of breast  cancer    Adrenal cortical nodule: repeat CT 6/2016    Calcification of aorta    Diabetic polyneuropathy associated with type 2 diabetes mellitus    Osteoporosis    KHOA (obstructive sleep apnea)    Type 2 diabetes mellitus with stage 3 chronic kidney disease, with long-term current use of insulin    Chronic kidney disease, stage 3, mod decreased GFR    Iron deficiency anemia    Chemotherapy-induced neuropathy    Hypomagnesemia    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, with long-term current use of insulin    Hypertensive retinopathy, bilateral    Multiple lung nodules    COPD (chronic obstructive pulmonary disease)    Mixed conductive and sensorineural hearing loss    ICD (implantable cardioverter-defibrillator) battery depletion     Past Medical History:   Diagnosis Date    Allergy     Asthma     Basal cell carcinoma     left forehead    Basal cell carcinoma     left nose    Basal cell carcinoma 05/20/2015    right nose    Basal cell carcinoma 12/22/2015    left lower post neck    Breast cancer     CAD (coronary artery disease)     Cardiomyopathy     Cardiomyopathy, ischemic     Cataract     CHF (congestive heart failure)     Chronic kidney disease, stage 3, mod decreased GFR 2/14/2017    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, with long-term current use of insulin 2/22/2018    COPD (chronic obstructive pulmonary disease)     COPD exacerbation 4/8/2018    Defibrillator discharge     Diabetes mellitus     Diabetes mellitus type II     Diabetes with neurologic complications     Goiter     MNG    HX: breast cancer     Hyperlipidemia     Hypertension     Iron deficiency anemia 5/16/2017    Left kidney mass     Meningioma     Osteoporosis, postmenopausal     Pacemaker     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    Skin  cancer     s/p excision    Sleep apnea     CPAP    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/2015    mid forehead    Unspecified vitamin D deficiency     Ventricular tachycardia     Vitamin B12 deficiency     Vitamin D deficiency disease      Past Surgical History:   Procedure Laterality Date    BASAL CELL CARCINOMA EXCISION      posterior neck and nose    BREAST BIOPSY      BREAST CYST EXCISION Left     BREAST SURGERY      CARDIAC DEFIBRILLATOR PLACEMENT      x 2    CATARACT EXTRACTION W/  INTRAOCULAR LENS IMPLANT Bilateral     CHOLECYSTECTOMY      Colonoscopy  N/A 8/27/2014    Performed by Leonidas Velasquez MD at [facility] ENDO (4TH FLR)    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    FRACTURE SURGERY      left elbow and wrist as a child    HYSTERECTOMY      MASTECTOMY Right     REPLACEMENT, ICD GENERATOR N/A 12/17/2018    Performed by Jan Mckeon MD at [facility] EP LAB    Revision, Skin Pocket, For Cardioverter-Defibrillator  12/17/2018    Performed by Jan Mckeon MD at [facility] EP LAB    SQUAMOUS CELL CARCINOMA EXCISION      remved from forehead    TONSILLECTOMY       Family History   Problem Relation Age of Onset    Diabetes Father     Heart disease Father     Diabetes Sister     Heart disease Sister     Diabetes Brother     Heart disease Brother     Hypertension Brother     Diabetes Brother     Heart disease Brother     Hypertension Brother     Diabetes Brother     Heart disease Brother     Cancer Brother         colon    Diabetes Brother     Cancer Son         skin    Diabetes Son         prediabetes    Diabetes Daughter         prediabetes    Cancer Daughter         melanoma    Obesity Daughter     Melanoma Daughter     Diabetes Son     Asthma Mother     Hypertension Mother     Stroke Mother     No Known Problems Maternal Grandmother     No Known Problems Maternal Grandfather     No Known Problems Paternal Grandmother     No Known Problems Paternal Grandfather     Amblyopia Neg  "Hx     Blindness Neg Hx     Cataracts Neg Hx     Glaucoma Neg Hx     Macular degeneration Neg Hx     Retinal detachment Neg Hx     Strabismus Neg Hx     Thyroid disease Neg Hx      Review of Systems  Objective:     Vitals:    01/02/19 0702   BP: (!) 122/58   Pulse: 88   SpO2: 96%   Weight: 72.6 kg (160 lb 0.9 oz)   Height: 5' 3" (1.6 m)   PainSc: 0-No pain     Body mass index is 28.35 kg/m².  Physical Exam  Assessment:     1. Type 2 diabetes mellitus with stage 3 chronic kidney disease, with long-term current use of insulin    2. Chronic kidney disease, stage 3, mod decreased GFR    3. Meningioma    4. Calcification of aorta    5. Nonischemic cardiomyopathy    6. Chemotherapy-induced neuropathy    7. Chronic systolic heart failure    8. Chronic obstructive pulmonary disease, unspecified COPD type    9. Adrenal cortical nodule: repeat CT 6/2016    10. Coronary artery disease involving native coronary artery of native heart without angina pectoris      Plan:   Myesha was seen today for follow-up.    Diagnoses and all orders for this visit:    Type 2 diabetes mellitus with stage 3 chronic kidney disease, with long-term current use of insulin    Chronic kidney disease, stage 3, mod decreased GFR    Meningioma    Calcification of aorta    Nonischemic cardiomyopathy    Chemotherapy-induced neuropathy    Chronic systolic heart failure    Chronic obstructive pulmonary disease, unspecified COPD type  -     NEBULIZER KIT (SUPPLIES) FOR HOME USE  -     HME - OTHER    Adrenal cortical nodule: repeat CT 6/2016    Coronary artery disease involving native coronary artery of native heart without angina pectoris        Problem List Items Addressed This Visit     Nonischemic cardiomyopathy    Overview     EF normalized after CRT         Current Assessment & Plan     Chronic Active Condition:    Monitor- signs, symptoms, disease progression, disease regression: no new symptoms    Evaluate: test results, medication " effectiveness, response to treatment: good response to medications    Assess/Address-ordering tests, discussion , review records, counseling    Treat-medication, therapies, other modalities: continue following with Dr. Fernando           Chronic systolic heart failure    Current Assessment & Plan     Chronic Active Condition:    Monitor- signs, symptoms, disease progression, disease regression: none    Evaluate: test results, medication effectiveness, response to treatment: sees Dr. Fernando Cardiology    Assess/Address-ordering tests, discussion , review records, counseling    Treat-medication, therapies, other modalities: no change in medication           Meningioma    Coronary artery disease involving native coronary artery without angina pectoris - Non-obstructive 50% LAD    Current Assessment & Plan     Chronic Active Condition:    Monitor- signs, symptoms, disease progression, disease regression: stable no chest pain    Evaluate: test results, medication effectiveness, response to treatment    Assess/Address-ordering tests, discussion , review records, counseling    Treat-medication, therapies, other modalities: same meds and follows with Dr. Fernando           Adrenal cortical nodule: repeat CT 6/2016    Current Assessment & Plan     Chronic Active Condition:    Monitor- signs, symptoms, disease progression, disease regression: none    Evaluate: test results, medication effectiveness, response to treatment: last  CT was 12/2017 and left adrenal slightly larger but still feel adenoma    Assess/Address-ordering tests, discussion , review records, counseling: none    Treat-medication, therapies, other modalities: none           Calcification of aorta    Current Assessment & Plan     Chronic Active Condition:    Monitor- signs, symptoms, disease progression, disease regression: none    Evaluate: test results, medication effectiveness, response to treatment: none    Assess/Address-ordering tests, discussion , review records,  counseling: none    Treat-medication, therapies, other modalities: on asa and statin           Type 2 diabetes mellitus with stage 3 chronic kidney disease, with long-term current use of insulin - Primary    Current Assessment & Plan     Chronic Active Condition:    Monitor- signs, symptoms, disease progression, disease regression  Worsening GFR while on metformin    Evaluate: test results, medication effectiveness, response to treatment  Lab of 12/2018 GFR 33    Assess/Address-ordering tests, discussion , review records, counseling  Made patient aware of concern    Treat-medication, therapies, other modalities  Decreased metformin by 1/2 of previous dose. Now at metformin 500 mg bid and seeing patient for adjustment of insulin           Chronic kidney disease, stage 3, mod decreased GFR    Overview     Reduced metformin to 50% of previous as patient has GFR 32 ( 30-45) and is currently on insulin         Current Assessment & Plan     Chronic Active Condition:    Monitor- signs, symptoms, disease progression, disease regression: none    Evaluate: test results, medication effectiveness, response to treatment:decreased metformin by 50%    Assess/Address-ordering tests, discussion , review records, counseling    Treat-medication, therapies, other modalities: Consult to Nephrology made           Chemotherapy-induced neuropathy    Overview     See hem/onc notes dated 03/2017         Current Assessment & Plan     Chronic Active Condition:    Monitor- signs, symptoms, disease progression, disease regression: patient reports same mostly problematic at night, now having trouble with buttoning    Evaluate: test results, medication effectiveness, response to treatment: none    Assess/Address-ordering tests, discussion , review records, counseling: none    Treat-medication, therapies, other modalities: declines med           COPD (chronic obstructive pulmonary disease)    Current Assessment & Plan     Chronic Active  "Condition:    Monitor- signs, symptoms, disease progression, disease regression: recent weather worsened symptoms, is using nebulizer by needs new equipment    Evaluate: test results, medication effectiveness, response to treatment: response to nebulizer good    Assess/Address-ordering tests, discussion , review records, counseling    Treat-medication, therapies, other modalities: new equipment needed           Relevant Orders    NEBULIZER KIT (SUPPLIES) FOR HOME USE    HME - OTHER        Orders Placed This Encounter   Procedures    NEBULIZER KIT (SUPPLIES) FOR HOME USE     Order Specific Question:   Height:     Answer:   5' 3" (1.6 m)     Order Specific Question:   Weight:     Answer:   72.6 kg (160 lb 0.9 oz)     Order Specific Question:   Length of need (1-99 months):     Answer:   99     Order Specific Question:   Mask or Mouthpiece?     Answer:   Mouthpiece    HME - OTHER     Order Specific Question:   Type of Equipment:     Answer:   home nebulizer     Order Specific Question:   Height:     Answer:   5' 3" (1.6 m)     Order Specific Question:   Weight:     Answer:   72.6 kg (160 lb 0.9 oz)     No Follow-up on file.     Medication List           Accurate as of 1/2/19  7:40 AM. If you have any questions, ask your nurse or doctor.               CHANGE how you take these medications    blood sugar diagnostic Strp  Commonly known as:  ACCU-CHEK HECTOR  Uses Accu-Check Hector meter to test BG 4x/day  What changed:  additional instructions     metFORMIN 500 MG tablet  Commonly known as:  GLUCOPHAGE  Take 2 tablets (1,000 mg total) by mouth 2 (two) times daily with meals.  What changed:    · how much to take  · additional instructions        CONTINUE taking these medications    ACCU-CHEK HECTOR CONTROL SOLN Soln  Generic drug:  blood glucose control high,low     ACCU-CHEK HECTOR PLUS METER Misc  Generic drug:  blood-glucose meter     albuterol 90 mcg/actuation inhaler  Commonly known as:  PROVENTIL/VENTOLIN HFA  Inhale " 2 puffs into the lungs every 6 (six) hours as needed for Wheezing. Rescue     albuterol-ipratropium 2.5 mg-0.5 mg/3 mL nebulizer solution  Commonly known as:  DUO-NEB  TAKE 1 VIAL BY NEBULIZATION EVERY 4 (FOUR) HOURS. RESCUE     azelastine 137 mcg (0.1 %) nasal spray  Commonly known as:  ASTELIN  1 spray (137 mcg total) by Nasal route 2 (two) times daily. For severe allergy     B-12 DOTS ORAL     benzonatate 100 MG capsule  Commonly known as:  TESSALON PERLES  Take 2 capsules (200 mg total) by mouth 3 (three) times daily as needed for Cough.     carvedilol 25 MG tablet  Commonly known as:  COREG  Take 2 tablets (50 mg total) by mouth 2 (two) times daily.     cephALEXin 500 MG capsule  Commonly known as:  KEFLEX  Take 1 capsule (500 mg total) by mouth every 8 (eight) hours.     chlorthalidone 25 MG Tab  Commonly known as:  HYGROTEN  TAKE 1 TABLET (25 MG TOTAL) BY MOUTH ONCE DAILY.     cloNIDine 0.1 MG tablet  Commonly known as:  CATAPRES  TAKE 1 TABLET (0.1 MG TOTAL) BY MOUTH EVERY EVENING.     ENTERIC COATED ASPIRIN 81 MG EC tablet  Generic drug:  aspirin     FISH  mg Cap  Generic drug:  omega-3 fatty acids     fluticasone 50 mcg/actuation nasal spray  Commonly known as:  FLONASE  2 sprays (100 mcg total) by Each Nare route once daily.     fluticasone-salmeterol 250-50 mcg/dose 250-50 mcg/dose diskus inhaler  Commonly known as:  ADVAIR  Inhale 1 puff into the lungs 2 (two) times daily. This is a change from one month     glimepiride 2 MG tablet  Commonly known as:  AMARYL  TAKE 1 TABLET BY MOUTH daily     hydrALAZINE 100 MG tablet  Commonly known as:  APRESOLINE  TAKE 1 TABLET (100 MG TOTAL) BY MOUTH 3 (THREE) TIMES DAILY.     insulin glargine 100 units/mL (3mL) SubQ pen  Commonly known as:  LANTUS SOLOSTAR U-100 INSULIN  Inject 30 Units into the skin every evening.     irbesartan 300 MG tablet  Commonly known as:  AVAPRO  Take 1 tablet (300 mg total) by mouth every evening.     * lancets Misc  Commonly known  "as:  ACCU-CHEK SOFTCLIX LANCETS  Uses Accu-Chek Angelica meter to test BG 4x/day     * lancets 30 gauge Misc     lancing device Misc     levocetirizine 5 MG tablet  Commonly known as:  XYZAL  Take 1 tablet (5 mg total) by mouth every evening.     linagliptin 5 mg Tab tablet  Commonly known as:  TRADJENTA  Take 1 tablet (5 mg total) by mouth once daily.     magnesium oxide 400 mg (241.3 mg magnesium) tablet  Commonly known as:  MAG-OX  Take 1 tablet (400 mg total) by mouth once daily.     nitroGLYCERIN 0.4 MG SL tablet  Commonly known as:  NITROSTAT     pen needle, diabetic 31 gauge x 3/16" Ndle  Commonly known as:  BD ULTRA-FINE MINI PEN NEEDLE  USE WITH LANTUS AT BEDTIME     simvastatin 20 MG tablet  Commonly known as:  ZOCOR  TAKE 1 TABLET (20 MG TOTAL) BY MOUTH EVERY EVENING.     VITAMIN D3 2,000 unit Tab  Generic drug:  cholecalciferol (vitamin D3)         * This list has 2 medication(s) that are the same as other medications prescribed for you. Read the directions carefully, and ask your doctor or other care provider to review them with you.                "

## 2019-01-02 NOTE — ASSESSMENT & PLAN NOTE
Chronic Active Condition:    Monitor- signs, symptoms, disease progression, disease regression: none    Evaluate: test results, medication effectiveness, response to treatment:decreased metformin by 50%    Assess/Address-ordering tests, discussion , review records, counseling    Treat-medication, therapies, other modalities: Consult to Nephrology made

## 2019-01-07 ENCOUNTER — OFFICE VISIT (OUTPATIENT)
Dept: DERMATOLOGY | Facility: CLINIC | Age: 79
End: 2019-01-07
Payer: MEDICARE

## 2019-01-07 DIAGNOSIS — Z85.828 HISTORY OF NONMELANOMA SKIN CANCER: ICD-10-CM

## 2019-01-07 DIAGNOSIS — L57.0 AK (ACTINIC KERATOSIS): Primary | ICD-10-CM

## 2019-01-07 DIAGNOSIS — L82.1 SK (SEBORRHEIC KERATOSIS): ICD-10-CM

## 2019-01-07 DIAGNOSIS — L81.4 LENTIGO: ICD-10-CM

## 2019-01-07 PROCEDURE — 1101F PT FALLS ASSESS-DOCD LE1/YR: CPT | Mod: CPTII,HCNC,S$GLB, | Performed by: DERMATOLOGY

## 2019-01-07 PROCEDURE — 17003 DESTRUCTION, PREMALIGNANT LESIONS; SECOND THROUGH 14 LESIONS: ICD-10-PCS | Mod: HCNC,S$GLB,, | Performed by: DERMATOLOGY

## 2019-01-07 PROCEDURE — 17000 DESTRUCT PREMALG LESION: CPT | Mod: HCNC,S$GLB,, | Performed by: DERMATOLOGY

## 2019-01-07 PROCEDURE — 17000 PR DESTRUCTION(LASER SURGERY,CRYOSURGERY,CHEMOSURGERY),PREMALIGNANT LESIONS,FIRST LESION: ICD-10-PCS | Mod: HCNC,S$GLB,, | Performed by: DERMATOLOGY

## 2019-01-07 PROCEDURE — 1101F PR PT FALLS ASSESS DOC 0-1 FALLS W/OUT INJ PAST YR: ICD-10-PCS | Mod: CPTII,HCNC,S$GLB, | Performed by: DERMATOLOGY

## 2019-01-07 PROCEDURE — 99999 PR PBB SHADOW E&M-EST. PATIENT-LVL II: ICD-10-PCS | Mod: PBBFAC,HCNC,, | Performed by: DERMATOLOGY

## 2019-01-07 PROCEDURE — 99213 PR OFFICE/OUTPT VISIT, EST, LEVL III, 20-29 MIN: ICD-10-PCS | Mod: 25,HCNC,S$GLB, | Performed by: DERMATOLOGY

## 2019-01-07 PROCEDURE — 17003 DESTRUCT PREMALG LES 2-14: CPT | Mod: HCNC,S$GLB,, | Performed by: DERMATOLOGY

## 2019-01-07 PROCEDURE — 99999 PR PBB SHADOW E&M-EST. PATIENT-LVL II: CPT | Mod: PBBFAC,HCNC,, | Performed by: DERMATOLOGY

## 2019-01-07 PROCEDURE — 99213 OFFICE O/P EST LOW 20 MIN: CPT | Mod: 25,HCNC,S$GLB, | Performed by: DERMATOLOGY

## 2019-01-07 NOTE — PROGRESS NOTES
Subjective:       Patient ID:  Myesha Quinones is a 78 y.o. female who presents for   Chief Complaint   Patient presents with    Skin Check     UBSE    Lesion     nose     Patient complains of lesion- red spot   Location: nasal tip  Duration: 2 months  Symptoms: asymptomatic   Relieving factors/Previous treatments: neosporin    Additional: Patient presents for UBSE. Last seen one year ago 12/18/2017 for UBSE and has history of AKs. History of NMSC:  mid forehead- INVASIVE SQUAMOUS CELL CARCINOMA and removed by Dr. Roche in 12/2015.           Review of Systems   Constitutional: Negative for fever.   HENT: Negative for congestion, rhinorrhea and postnasal drip.    Eyes: Negative for itching.   Skin: Positive for dry skin and activity-related sunscreen use. Negative for itching, rash, daily sunscreen use and recent sunburn.   Hematologic/Lymphatic: Bruises/bleeds easily (has defibrillator. takes asa).   Allergic/Immunologic: Negative for environmental allergies.        Objective:    Physical Exam   Constitutional: She appears well-developed and well-nourished. No distress.   Neurological: She is alert and oriented to person, place, and time. She is not disoriented.   Psychiatric: She has a normal mood and affect.   Skin:   Areas Examined (abnormalities noted in diagram):   Head / Face Inspection Performed  Neck Inspection Performed  Chest / Axilla Inspection Performed  Back Inspection Performed  RUE Inspected  LUE Inspection Performed                   Diagram Legend     Erythematous scaling macule/papule c/w actinic keratosis       Vascular papule c/w angioma      Pigmented verrucoid papule/plaque c/w seborrheic keratosis      Yellow umbilicated papule c/w sebaceous hyperplasia      Irregularly shaped tan macule c/w lentigo     1-2 mm smooth white papules consistent with Milia      Movable subcutaneous cyst with punctum c/w epidermal inclusion cyst      Subcutaneous movable cyst c/w pilar cyst      Firm pink  to brown papule c/w dermatofibroma      Pedunculated fleshy papule(s) c/w skin tag(s)      Evenly pigmented macule c/w junctional nevus     Mildly variegated pigmented, slightly irregular-bordered macule c/w mildly atypical nevus      Flesh colored to evenly pigmented papule c/w intradermal nevus       Pink pearly papule/plaque c/w basal cell carcinoma      Erythematous hyperkeratotic cursted plaque c/w SCC      Surgical scar with no sign of skin cancer recurrence      Open and closed comedones      Inflammatory papules and pustules      Verrucoid papule consistent consistent with wart     Erythematous eczematous patches and plaques     Dystrophic onycholytic nail with subungual debris c/w onychomycosis     Umbilicated papule    Erythematous-base heme-crusted tan verrucoid plaque consistent with inflamed seborrheic keratosis     Erythematous Silvery Scaling Plaque c/w Psoriasis     See annotation      Assessment / Plan:        AK (actinic keratosis)  Cryosurgery Procedure Note    Verbal consent from the patient is obtained including, but not limited to, risk of hypopigmentation/hyperpigmentation, scar, recurrence of lesion. The patient is aware of the precancerous quality and need for treatment of these lesions. Liquid nitrogen cryosurgery is applied to the 3 actinic keratoses, as detailed in the physical exam, to produce a freeze injury. The patient is aware that blisters may form and is instructed on wound care with gentle cleansing and use of vaseline ointment to keep moist until healed. The patient is supplied a handout on cryosurgery and is instructed to call if lesions do not completely resolve.      SK (seborrheic keratosis)  These are benign inherited growths without a malignant potential. Reassurance given to patient. No treatment is necessary.       Lentigo   - stable and chronic      History of nonmelanoma skin cancer  Area(s) of previous NMSC evaluated with no signs of recurrence.    Upper body skin  examination performed today including at least 6 points as noted in physical examination. No lesions suspicious for malignancy noted.               Follow-up in about 1 year (around 1/7/2020).

## 2019-01-07 NOTE — PATIENT INSTRUCTIONS

## 2019-01-15 ENCOUNTER — OFFICE VISIT (OUTPATIENT)
Dept: ENDOCRINOLOGY | Facility: CLINIC | Age: 79
End: 2019-01-15
Payer: MEDICARE

## 2019-01-15 VITALS
WEIGHT: 159.81 LBS | BODY MASS INDEX: 28.32 KG/M2 | SYSTOLIC BLOOD PRESSURE: 136 MMHG | DIASTOLIC BLOOD PRESSURE: 62 MMHG | HEART RATE: 79 BPM | HEIGHT: 63 IN

## 2019-01-15 DIAGNOSIS — Z79.4 CONTROLLED TYPE 2 DIABETES MELLITUS WITH BOTH EYES AFFECTED BY MILD NONPROLIFERATIVE RETINOPATHY AND MACULAR EDEMA, WITH LONG-TERM CURRENT USE OF INSULIN: ICD-10-CM

## 2019-01-15 DIAGNOSIS — E11.3213 CONTROLLED TYPE 2 DIABETES MELLITUS WITH BOTH EYES AFFECTED BY MILD NONPROLIFERATIVE RETINOPATHY AND MACULAR EDEMA, WITH LONG-TERM CURRENT USE OF INSULIN: ICD-10-CM

## 2019-01-15 DIAGNOSIS — N18.30 TYPE 2 DIABETES MELLITUS WITH STAGE 3 CHRONIC KIDNEY DISEASE, WITH LONG-TERM CURRENT USE OF INSULIN: Primary | ICD-10-CM

## 2019-01-15 DIAGNOSIS — E11.22 TYPE 2 DIABETES MELLITUS WITH STAGE 3 CHRONIC KIDNEY DISEASE, WITH LONG-TERM CURRENT USE OF INSULIN: Primary | ICD-10-CM

## 2019-01-15 DIAGNOSIS — E78.00 PURE HYPERCHOLESTEROLEMIA: ICD-10-CM

## 2019-01-15 DIAGNOSIS — M81.0 OSTEOPOROSIS, UNSPECIFIED OSTEOPOROSIS TYPE, UNSPECIFIED PATHOLOGICAL FRACTURE PRESENCE: ICD-10-CM

## 2019-01-15 DIAGNOSIS — N18.30 CHRONIC KIDNEY DISEASE, STAGE 3, MOD DECREASED GFR: ICD-10-CM

## 2019-01-15 DIAGNOSIS — Z79.4 TYPE 2 DIABETES MELLITUS WITH STAGE 3 CHRONIC KIDNEY DISEASE, WITH LONG-TERM CURRENT USE OF INSULIN: Primary | ICD-10-CM

## 2019-01-15 DIAGNOSIS — E04.2 NONTOXIC MULTINODULAR GOITER: ICD-10-CM

## 2019-01-15 DIAGNOSIS — E27.8 ADRENAL CORTICAL NODULE: ICD-10-CM

## 2019-01-15 PROCEDURE — 99999 PR PBB SHADOW E&M-EST. PATIENT-LVL V: ICD-10-PCS | Mod: PBBFAC,HCNC,, | Performed by: INTERNAL MEDICINE

## 2019-01-15 PROCEDURE — 3075F PR MOST RECENT SYSTOLIC BLOOD PRESS GE 130-139MM HG: ICD-10-PCS | Mod: HCNC,CPTII,S$GLB, | Performed by: INTERNAL MEDICINE

## 2019-01-15 PROCEDURE — 3075F SYST BP GE 130 - 139MM HG: CPT | Mod: HCNC,CPTII,S$GLB, | Performed by: INTERNAL MEDICINE

## 2019-01-15 PROCEDURE — 1101F PT FALLS ASSESS-DOCD LE1/YR: CPT | Mod: HCNC,CPTII,S$GLB, | Performed by: INTERNAL MEDICINE

## 2019-01-15 PROCEDURE — 3078F PR MOST RECENT DIASTOLIC BLOOD PRESSURE < 80 MM HG: ICD-10-PCS | Mod: HCNC,CPTII,S$GLB, | Performed by: INTERNAL MEDICINE

## 2019-01-15 PROCEDURE — 3078F DIAST BP <80 MM HG: CPT | Mod: HCNC,CPTII,S$GLB, | Performed by: INTERNAL MEDICINE

## 2019-01-15 PROCEDURE — 99214 PR OFFICE/OUTPT VISIT, EST, LEVL IV, 30-39 MIN: ICD-10-PCS | Mod: HCNC,S$GLB,, | Performed by: INTERNAL MEDICINE

## 2019-01-15 PROCEDURE — 99214 OFFICE O/P EST MOD 30 MIN: CPT | Mod: HCNC,S$GLB,, | Performed by: INTERNAL MEDICINE

## 2019-01-15 PROCEDURE — 1101F PR PT FALLS ASSESS DOC 0-1 FALLS W/OUT INJ PAST YR: ICD-10-PCS | Mod: HCNC,CPTII,S$GLB, | Performed by: INTERNAL MEDICINE

## 2019-01-15 PROCEDURE — 99999 PR PBB SHADOW E&M-EST. PATIENT-LVL V: CPT | Mod: PBBFAC,HCNC,, | Performed by: INTERNAL MEDICINE

## 2019-01-15 NOTE — ASSESSMENT & PLAN NOTE
Stable on imaging for over a decade suggests benign adenomas. She thinks she had a biochemical workup in the past, but the results aren't available in our system as they predate 2004. Blood pressure is reasonably well-controlled. No hypokalemia. No symptoms to suggest pheo, and no overt Cushing syndrome. Subclinical Cushing is a possibility, but the benefit vs. risk of adrenalectomy would very likely favor conservative management. Therefore, will hold off on further testing at this point since it would be unlikely to alter management.

## 2019-01-15 NOTE — PROGRESS NOTES
Subjective:      Chief Complaint: Thyroid Problem and Diabetes      HPI: Myesha Quinones is a 78 y.o. female who is here for a follow-up evaluation for type 2 diabetes mellitus    With regards to the diabetes:    Current symptoms/problems include none. Her metformin dose was recently decreased from [phone] down to 500 twice daily with breakfast and supper.     The patient was initially diagnosed with Type 2 diabetes mellitus:  1992    Known diabetic complications: CKD and retinopathy  Cardiovascular risk factors: advanced age (older than 55 for men, 65 for women), diabetes mellitus and dyslipidemia  Current diabetic medications include:  Lantus 30 units nightly  Glimepiride 2 mg daily  Tradjenta 5 mg daily  Metformin 500 mg BID    Prior visit with dietician: yes  Current diet: Was doing well with her diet, but admits she had some indiscretions during the holidays.    Current monitoring regimen: home blood tests - once daily times daily  Home blood sugar records: Brought log with ~10 readings. AM: ; Pre-lunch: 198 (single reading); Pre-dinner: 171 (single reading)  Any episodes of hypoglycemia? No - last about a month ago. Much better since the lantus dose reduction from 46 > 30 nightly.    Diabetic Health Maintenance:        HbA1c < 7.0%: 7.1% most recently        Eye exam within last year: Yes - followed regularly for eye injections for retinopathy every 2 months        Foot exam within last year: Yes, scheduled in 2 weeks with podiatrist.        Urine microalbumin/creatinine within last year: Yes, elevated at 716 - referred to nephrology        Blood pressure <130/80: No, but close   Use of ACE/ARB: Yes        Checked Weight: Yes   Weight trend: stable        Checked lipids within last year: Yes   Statin drug:  Yes        Low dose ASA?: Yes    Lab Results   Component Value Date    HGBA1C 7.1 (H) 12/19/2018    HGBA1C 6.8 (H) 08/21/2018    HGBA1C 7.3 (H) 07/09/2018     With regards to the thyroid  "nodule:  Presents with nodule that was diagnosed by ultrasound    Preexisting thyroid disease?: no    Compressiveymptoms:  Anterior neck pressure?: no  Dysphagia?: no  Voice changes?: no    Risk Factors:  Radiation to head or neck for any treatment of cancer or exposure to radiation?: no  Personal history of colon or breast cancer?: no  Tobacco use?: no  Family history of thyroid cancer?: no    Symptoms of hyper or hypothyroidism:  None    Previous biopsy results:  Left nodule: Benign - 2016  Right nodule: Benign - 2014    Last ultrasound: 5/2018: Reviewed    With regards to the adrenal incidentaloma:     Was found to have bilateral adrenal nodules found incidentally on CT scan "many" years ago. She reports she was initially having yearly CT scans to follow them, but it was recommended to stop doing them due to lack of growth. There are two CT scans in our system (2016 and 2017). The left nodule increased from 1.7 > 2.0 cm and measures -5.7 HU pre-contrast. The right nodule was the same size in the interval with indeterminate density approx. 20 HU.    She thinks a functional workup was done around the time they were discovered, but I don't have those records.    Pt reports no abdominal discomfort.  No h/o hypertensive emergency. Blood pressure hasn't been difficult to control recently.  Pt denies h/o paroxysmal symptoms such as syncope, pallor, dizziness, palpitations, headaches.     No new HTN or worsening of known HTN   She has diabetes, but has not worsened recently.  Denies polyuria, polydipsia, nocturia, unexplained weight loss or blurred vision    No easy bruisability or abnormal stretch marks. No muscle weakness     With regards to osteoporosis:     Past medication: Alendronate - did not tolerate due to bone pain.  She was also offered prolia, but she read about the side-effects and is not interested in starting it or any other therapy for osteoporosis.    Calcium intake?  1 serving of dairy per day  Vit D " intake?  2000 IU a day   Weight bearing exercise?   no  Recent falls? no  Fall Precautions? no    Recent fractures? no  Height loss (>2 inches)? no    Tobacco use ?  no  EtOH use? nono     Current diarrhea or h/o malabsorption? no  Chronic steroids? Inhaled only  Anticoagulants? no  Proton Pump Inhibitors? no  Antiseizure medications? no   Thyroid disease? no  Anemia? yes  Kidney Stones? no  Hyperparathyroidism? no    Malignancy involving bone (active or history)? no  Prior radiation treatment? no  Unexplained elevation of alkaline phosphatase? no  Dental work planned? no    Last BMD: 4/2018  Lumbar Spine: BMD 0.866g/cm2: T-score of -1.6.    Total Hip: BMD 0.817g/cm2. T-score is -1.0, and the Z-score is 1.0.    Femoral neck: Bone mineral density is 0.611g/cm2 and the T-score is -2.1 and the Z-score is 0.1 g/cm2.    There is a 16% risk of a major osteoporotic fracture and a 4.7% risk of hip fracture in the next 10 years (FRAX).    Compared with previous DXA, BMD at the lumbar spine has remained stable, and the BMD at the total hip has declined 4.5%.    Reviewed past medical, family, social history and updated as appropriate.    Review of Systems   Constitutional: Negative for unexpected weight change.   Eyes: Negative for visual disturbance.   Respiratory: Negative for shortness of breath.    Cardiovascular: Negative for chest pain.   Gastrointestinal: Negative for abdominal pain.   Genitourinary: Negative for urgency.   Musculoskeletal: Negative for arthralgias.   Skin: Negative for wound.   Neurological: Negative for headaches.   Hematological: Does not bruise/bleed easily.   Psychiatric/Behavioral: Negative for sleep disturbance.     Objective:     Vitals:    01/15/19 1313   BP: 136/62   Pulse: 79       Physical Exam   Constitutional: She is oriented to person, place, and time. She appears well-developed and well-nourished. No distress.   HENT:   Head: Normocephalic and atraumatic.   Eyes: Conjunctivae are normal.  Right eye exhibits no discharge. Left eye exhibits no discharge.   Neck: No tracheal deviation present. No thyromegaly present.   Cardiovascular: Normal rate.   Pulmonary/Chest: Effort normal. No respiratory distress.   Musculoskeletal: She exhibits no edema.   No digital clubbing or extremity cyanosis   Neurological: She is alert and oriented to person, place, and time. Coordination normal.   Psychiatric: She has a normal mood and affect. Her behavior is normal.   Nursing note and vitals reviewed.      Wt Readings from Last 10 Encounters:   01/15/19 1313 72.5 kg (159 lb 13.3 oz)   01/02/19 0702 72.6 kg (160 lb 0.9 oz)   12/19/18 0704 71.9 kg (158 lb 8.2 oz)   12/17/18 0957 71.2 kg (157 lb)   10/26/18 0830 73.1 kg (161 lb 0.7 oz)   08/21/18 0702 71.2 kg (156 lb 15.5 oz)   07/30/18 1020 71.8 kg (158 lb 4.6 oz)   07/18/18 0933 71.3 kg (157 lb 3 oz)   07/16/18 0841 71.7 kg (158 lb 1.1 oz)   06/26/18 0933 69.6 kg (153 lb 5.3 oz)       Lab Results   Component Value Date    HGBA1C 7.1 (H) 12/19/2018     Lab Results   Component Value Date    CHOL 112 (L) 12/19/2018    HDL 49 12/19/2018    LDLCALC 43.4 (L) 12/19/2018    TRIG 98 12/19/2018    CHOLHDL 43.8 12/19/2018     Lab Results   Component Value Date     12/19/2018    K 4.2 12/19/2018     12/19/2018    CO2 25 12/19/2018     (H) 12/19/2018    BUN 42 (H) 12/19/2018    CREATININE 1.5 (H) 12/19/2018    CALCIUM 9.7 12/19/2018    PROT 7.5 12/19/2018    ALBUMIN 3.9 12/19/2018    BILITOT 0.4 12/19/2018    ALKPHOS 77 12/19/2018    AST 17 12/19/2018    ALT 18 12/19/2018    ANIONGAP 10 12/19/2018    ESTGFRAFRICA 38 (A) 12/19/2018    EGFRNONAA 33 (A) 12/19/2018    TSH 1.127 12/19/2018      Lab Results   Component Value Date    MICALBCREAT 716.3 (H) 12/19/2018       Assessment/Plan:     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 See above regarding diabetes.    Type 2 diabetes mellitus with stage 3 chronic kidney disease, with long-term current use of insulin  Reviewed goals of therapy are to get the best control we can without hypoglycemia.     Medication changes:   Stop: Glimepiride - risk of hypoglycemia with declining GFR outweighs the small benefit  Continue:  Lantus 30 units nightly  Metformin 500 mg BID - need to monitor GFR - low threshold to stop if GFR continues to worsen.  Tradjenta 5 mg daily     Reviewed patient's current insulin regimen.     Advised frequent self blood glucose monitoring. Patient encouraged to document glucose results and bring them to every clinic visit.    Hypoglycemia precautions discussed. Instructed on precautions before driving.      Close adherence to lifestyle changes recommended.      Eyes: Followed regularly in ophthalmology clinic  Feet: Podiatry scheduled 1/28  Kidneys: Urine microalbumin/Cr elevated - has nephrology follow-up later this month.  HbA1c: Recheck in 3 months.      Adrenal cortical nodule: repeat CT 6/2016  Stable on imaging for over a decade suggests benign adenomas. She thinks she had a biochemical workup in the past, but the results aren't available in our system as they predate 2004. Blood pressure is reasonably well-controlled. No hypokalemia. No symptoms to suggest pheo, and no overt Cushing syndrome. Subclinical Cushing is a possibility, but the benefit vs. risk of adrenalectomy would very likely favor conservative management. Therefore, will hold off on further testing at this point since it would be unlikely to alter management.    Nontoxic multinodular goiter  Repeat ultrasound in 8/2019. Will come on the same day as her  for ultrasound.    Hyperlipidemia  On simvastatin    Osteoporosis  Patient does not wish to pursue antiresorptive treatments citing previous side-effects and wanting to avoid new medications due to fear of side-effects (Prolia  specifically).    Taking vitamin D 2000 IU daily and she gets calcium in her diet.    Walking for weight bearing exercise.    Repeat BMD in 2-3 years.    RTC in 1 year    I discussed the case with Dr. Medina and she is in agreement with the plan.

## 2019-01-15 NOTE — ASSESSMENT & PLAN NOTE
Reviewed goals of therapy are to get the best control we can without hypoglycemia.     Medication changes:   Stop: Glimepiride - risk of hypoglycemia with declining GFR outweighs the small benefit  Continue:  Lantus 30 units nightly  Metformin 500 mg BID - need to monitor GFR - low threshold to stop if GFR continues to worsen.  Tradjenta 5 mg daily     Reviewed patient's current insulin regimen.     Advised frequent self blood glucose monitoring. Patient encouraged to document glucose results and bring them to every clinic visit.    Hypoglycemia precautions discussed. Instructed on precautions before driving.      Close adherence to lifestyle changes recommended.      Eyes: Followed regularly in ophthalmology clinic  Feet: Podiatry scheduled 1/28  Kidneys: Urine microalbumin/Cr elevated - has nephrology follow-up later this month.  HbA1c: Recheck in 3 months.

## 2019-01-15 NOTE — ASSESSMENT & PLAN NOTE
Patient does not wish to pursue antiresorptive treatments citing previous side-effects and wanting to avoid new medications due to fear of side-effects (Prolia specifically).    Taking vitamin D 2000 IU daily and she gets calcium in her diet.    Walking for weight bearing exercise.    Repeat BMD in 2-3 years.

## 2019-01-16 RX ORDER — CHLORTHALIDONE 25 MG/1
25 TABLET ORAL DAILY
Qty: 90 TABLET | Refills: 3 | Status: SHIPPED | OUTPATIENT
Start: 2019-01-16 | End: 2020-02-07

## 2019-01-18 ENCOUNTER — PATIENT OUTREACH (OUTPATIENT)
Dept: OTHER | Facility: OTHER | Age: 79
End: 2019-01-18

## 2019-01-18 NOTE — PROGRESS NOTES
Last 5 Patient Entered Readings                                      Current 30 Day Average: 147/65     Recent Readings 2/8/2019 2/7/2019 1/30/2019 1/28/2019 1/25/2019    SBP (mmHg) 170 166 141 139 144    DBP (mmHg) 74 72 63 64 67    Pulse 83 75 79 71 87        Mrs. Quinones's BP was elevated last week. She started clonidine 0.1 mg patch on Thursday 2/7. She did not overlap oral clonidine once she started the patch, which is likely why her BP spiked. She has been on the patch for about 4 days, so BP should improve on next BP check.     Patient's BP average is above goal of <130/80.     Patient denies s/s of hypertension (SOB, CP, severe headaches, changes in vision) associated with high readings. Instructed patient to go to the ED if BP > 180/110 and accompanied by hypertensive s/s, patient confirms understanding.    Will continue to monitor regularly. Will follow up in 3-4 weeks, sooner if BP begins to trend upward or downward.    Patient has my contact information and knows to call with any concerns or clinical changes.     Current HTN regimen:  Hypertension Medications             carvedilol (COREG) 25 MG tablet Take 2 tablets (50 mg total) by mouth 2 (two) times daily.    chlorthalidone (HYGROTEN) 25 MG Tab Take 1 tablet (25 mg total) by mouth once daily.    cloNIDine 0.1 mg/24 hr td ptwk (CATAPRES) 0.1 mg/24 hr Place 1 patch onto the skin every 7 days.    hydrALAZINE (APRESOLINE) 100 MG tablet TAKE 1 TABLET (100 MG TOTAL) BY MOUTH 3 (THREE) TIMES DAILY.    irbesartan (AVAPRO) 300 MG tablet Take 1 tablet (300 mg total) by mouth every evening.    nitroGLYCERIN (NITROSTAT) 0.4 MG SL tablet Place 1 tablet under the tongue continuous prn.

## 2019-01-21 ENCOUNTER — OFFICE VISIT (OUTPATIENT)
Dept: NEPHROLOGY | Facility: CLINIC | Age: 79
End: 2019-01-21
Payer: MEDICARE

## 2019-01-21 ENCOUNTER — LAB VISIT (OUTPATIENT)
Dept: LAB | Facility: HOSPITAL | Age: 79
End: 2019-01-21
Attending: INTERNAL MEDICINE
Payer: MEDICARE

## 2019-01-21 VITALS
WEIGHT: 160.94 LBS | DIASTOLIC BLOOD PRESSURE: 80 MMHG | SYSTOLIC BLOOD PRESSURE: 140 MMHG | BODY MASS INDEX: 28.52 KG/M2 | HEART RATE: 93 BPM | HEIGHT: 63 IN | OXYGEN SATURATION: 95 %

## 2019-01-21 DIAGNOSIS — I10 ESSENTIAL HYPERTENSION: ICD-10-CM

## 2019-01-21 DIAGNOSIS — N18.30 TYPE 2 DM WITH CKD STAGE 3 AND HYPERTENSION: ICD-10-CM

## 2019-01-21 DIAGNOSIS — E11.22 TYPE 2 DM WITH CKD STAGE 3 AND HYPERTENSION: ICD-10-CM

## 2019-01-21 DIAGNOSIS — E83.42 HYPOMAGNESEMIA: ICD-10-CM

## 2019-01-21 DIAGNOSIS — I25.5 CARDIOMYOPATHY, ISCHEMIC: ICD-10-CM

## 2019-01-21 DIAGNOSIS — M89.8X9 METABOLIC BONE DISEASE: ICD-10-CM

## 2019-01-21 DIAGNOSIS — N06.8 ISOLATED PROTEINURIA WITH OTHER MORPHOLOGIC LESION: ICD-10-CM

## 2019-01-21 DIAGNOSIS — I12.9 TYPE 2 DM WITH CKD STAGE 3 AND HYPERTENSION: ICD-10-CM

## 2019-01-21 DIAGNOSIS — D50.8 OTHER IRON DEFICIENCY ANEMIA: Primary | ICD-10-CM

## 2019-01-21 DIAGNOSIS — D50.8 OTHER IRON DEFICIENCY ANEMIA: ICD-10-CM

## 2019-01-21 DIAGNOSIS — E78.2 MIXED HYPERLIPIDEMIA: ICD-10-CM

## 2019-01-21 PROBLEM — R80.9 PROTEINURIA: Status: ACTIVE | Noted: 2019-01-21

## 2019-01-21 PROCEDURE — 99499 RISK ADDL DX/OHS AUDIT: ICD-10-PCS | Mod: HCNC,S$GLB,, | Performed by: INTERNAL MEDICINE

## 2019-01-21 PROCEDURE — 3079F PR MOST RECENT DIASTOLIC BLOOD PRESSURE 80-89 MM HG: ICD-10-PCS | Mod: CPTII,HCNC,S$GLB, | Performed by: INTERNAL MEDICINE

## 2019-01-21 PROCEDURE — 99204 PR OFFICE/OUTPT VISIT, NEW, LEVL IV, 45-59 MIN: ICD-10-PCS | Mod: HCNC,S$GLB,, | Performed by: INTERNAL MEDICINE

## 2019-01-21 PROCEDURE — 3079F DIAST BP 80-89 MM HG: CPT | Mod: CPTII,HCNC,S$GLB, | Performed by: INTERNAL MEDICINE

## 2019-01-21 PROCEDURE — 1101F PT FALLS ASSESS-DOCD LE1/YR: CPT | Mod: CPTII,HCNC,S$GLB, | Performed by: INTERNAL MEDICINE

## 2019-01-21 PROCEDURE — 99999 PR PBB SHADOW E&M-EST. PATIENT-LVL III: ICD-10-PCS | Mod: PBBFAC,HCNC,, | Performed by: INTERNAL MEDICINE

## 2019-01-21 PROCEDURE — 82043 UR ALBUMIN QUANTITATIVE: CPT | Mod: HCNC

## 2019-01-21 PROCEDURE — 1101F PR PT FALLS ASSESS DOC 0-1 FALLS W/OUT INJ PAST YR: ICD-10-PCS | Mod: CPTII,HCNC,S$GLB, | Performed by: INTERNAL MEDICINE

## 2019-01-21 PROCEDURE — 99204 OFFICE O/P NEW MOD 45 MIN: CPT | Mod: HCNC,S$GLB,, | Performed by: INTERNAL MEDICINE

## 2019-01-21 PROCEDURE — 81001 URINALYSIS AUTO W/SCOPE: CPT | Mod: HCNC

## 2019-01-21 PROCEDURE — 99499 UNLISTED E&M SERVICE: CPT | Mod: HCNC,S$GLB,, | Performed by: INTERNAL MEDICINE

## 2019-01-21 PROCEDURE — 84156 ASSAY OF PROTEIN URINE: CPT | Mod: HCNC

## 2019-01-21 PROCEDURE — 99999 PR PBB SHADOW E&M-EST. PATIENT-LVL III: CPT | Mod: PBBFAC,HCNC,, | Performed by: INTERNAL MEDICINE

## 2019-01-21 PROCEDURE — 3077F SYST BP >= 140 MM HG: CPT | Mod: CPTII,HCNC,S$GLB, | Performed by: INTERNAL MEDICINE

## 2019-01-21 PROCEDURE — 3077F PR MOST RECENT SYSTOLIC BLOOD PRESSURE >= 140 MM HG: ICD-10-PCS | Mod: CPTII,HCNC,S$GLB, | Performed by: INTERNAL MEDICINE

## 2019-01-21 NOTE — LETTER
January 21, 2019      Emelina Gaston MD  [address]  [facility] [address]           [facility]  [address]  [facility] [address]  Phone: [phone]  Fax: [phone]          Patient: Myesha Quinones   MR Number: [id number]   YOB: 1940   Date of Visit: 1/21/2019       Dear Dr. Emelina Gaston:    Thank you for referring Myesha Quinones to me for evaluation. Attached you will find relevant portions of my assessment and plan of care.    If you have questions, please do not hesitate to call me. I look forward to following Myesha Quinones along with you.    Sincerely,    Shalini Azul MD    Enclosure  CC:  No Recipients    If you would like to receive this communication electronically, please contact [email or web address] or [phone] to request more information on [employer] access.    For providers and/or their staff who would like to refer a patient to [facility], please contact us through our one-stop-shop provider referral line, [facility], at [phone].    If you feel you have received this communication in error or would no longer like to receive these types of communications, please e-mail [email or web address]

## 2019-01-21 NOTE — PATIENT INSTRUCTIONS
1. Labs: labs and urine today and 4 mo    2.  Medications: no changes         5. BP:  Take BP and pulse  twice daily for one week, record              Bring results  to next visit.              Goal :   <130/80, > 110/60    6. Diet:  National Kidney Foundation:  www.[email or web address].org       Sodium: < 2000 milligrams daily including all food and drink      (one teaspoon of table salt has 2300 milligrams of sodium)      Mediterranean Like Diet:    It is very important that you reduce your salt intake to below 2 grams daily  Low/moderate  oxalate diet: avoid tea, chocolate, spinach, rhubarb, nuts as peanuts, cashews, almonds, walnuts..vit C   Low red meat  As beef, pork, lamb, venison, once a month  Avoid cold cuts  Shellfish, once a month  Eat fillets of fish, white lean meat chicken and turkey.  Avoid animal fat and butter and fried food completely.  Use extra virgin olive oil first cold press and cannola oil.  Plenty of fresh fruits and vegetables  Avoid sodas and sugar drinks       Vaccines: please check with your PCP and be sure to have an annual flu vaccine and keep up to date with your pneumococcal vaccine for pneumonia      Please avoid or minimize all NSAIDS (ibuprofen, motrin, aleve, indocin, naprosyn, BC powder, mobic, relafen, alleve, and any others) to minimize the risk to your kidneys    Please avoid Proton pump inhibitors such as protonix, prilosec, nexium unless specifically necessary and speak with your PCP about alternatives such as H2 blockers such as pepcid or zantac     Avoid laxatives and enemas with magnesium and phosphorous      Return to clinic: 4 mo

## 2019-01-21 NOTE — PROGRESS NOTES
Subjective:       Patient ID: Myesha Quinones is a 78 y.o. White female who presents for new evaluation of renal function    HPI   76yo female with a PMH of HTN, DM, nonischemic cardiomyopathy (AICD in place), HFpEF (50%), COPD, multinodular goiter, CKD3 baseline crt has historically been about 1.2-1.4 and in the last month 1.5. New AICD place din January .  Microalbuminuria has increased over the las 7 years from 69 ug/mg crt to 780 ug/mg crt, on irbesartan. HbgA1c 6.6-7.3. No available recent renal imaging. 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  There is a 1.9 cm lesion within the left kidney demonstrating mixed fat and soft tissue attenuation consistent with an angiomyolipoma.  No hydronephrosis is seen.  The ureters appear normal in course and caliber. The urinary bladder is unremarkable. The uterus is absent.      Review of Systems   Constitutional: Negative for appetite change, chills, fatigue, fever and unexpected weight change.   HENT: Negative for congestion, facial swelling, hearing loss, nosebleeds and trouble swallowing.    Eyes: Negative for pain, discharge, redness and visual disturbance.   Respiratory: Negative for cough, chest tightness, shortness of breath and wheezing.    Cardiovascular: Negative for chest pain, palpitations and leg swelling.   Gastrointestinal: Negative for abdominal pain, constipation, diarrhea, nausea and vomiting.   Endocrine: Negative for cold intolerance, heat intolerance and polydipsia.   Genitourinary: Negative for decreased urine volume, difficulty urinating, dysuria, flank pain, hematuria and urgency.   Musculoskeletal: Negative for arthralgias, back pain, joint swelling and myalgias.   Skin: Negative for color change, pallor, rash and wound.   Neurological: Negative for dizziness, tremors, seizures, syncope, speech difficulty, weakness and headaches.  "  Hematological: Negative for adenopathy. Does not bruise/bleed easily.   Psychiatric/Behavioral: Negative for agitation, behavioral problems, dysphoric mood, self-injury and sleep disturbance.       Objective:     Blood pressure (!) 140/80, pulse 93, height 5' 3" (1.6 m), weight 73 kg (160 lb 15 oz), SpO2 95 %.    Physical Exam   Constitutional: She is oriented to person, place, and time. She appears well-developed and well-nourished. No distress.   HENT:   Head: Normocephalic and atraumatic.   Mouth/Throat: No oropharyngeal exudate.   Eyes: Conjunctivae and EOM are normal. Pupils are equal, round, and reactive to light. Right eye exhibits no discharge. Left eye exhibits no discharge. No scleral icterus.   Neck: Normal range of motion. Neck supple. No JVD present. No tracheal deviation present. No thyromegaly present.   Cardiovascular: Normal rate, regular rhythm, normal heart sounds and intact distal pulses. Exam reveals no gallop.   No murmur heard.  + 1-2 LE edema bilateral L>R   Pulmonary/Chest: Effort normal. No stridor. No respiratory distress. She has no wheezes. She has no rales. She exhibits no tenderness.   occasional rhonchi and coarse crackle  With inspiratory wheeze R base   Abdominal: Soft. Bowel sounds are normal. She exhibits no distension and no mass. There is no tenderness. There is no rebound and no guarding. No hernia.   Musculoskeletal: She exhibits no edema or tenderness.   Lymphadenopathy:     She has no cervical adenopathy.   Neurological: She is alert and oriented to person, place, and time. She exhibits normal muscle tone.   Skin: Skin is warm and dry. No rash noted. She is not diaphoretic. No erythema.   Psychiatric: She has a normal mood and affect. Judgment and thought content normal.   Nursing note and vitals reviewed.      Assessment:       1. Other iron deficiency anemia    2. Type 2 DM with CKD stage 3 and hypertension    3. Hypomagnesemia    4. Essential hypertension    5. Mixed " hyperlipidemia    6. Cardiomyopathy, ischemic    7. 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 New AICD place din January .  Microalbuminuria has increased over the las 7 years from 69 ug/mg crt to 780 ug/mg crt, on irbesartan. HbgA1c 6.6-7.3. No available recent renal imaging. BP and wt stable    CKD stage 3 with non nephrotic but worsening proteinuria likely related to DM nephropathy, HTN and exaccerbated by ischemic cardiomyopathy and possible ischemic nephropathy.  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  30min spent with patient, more than 50% of the time counseling on sodium/calorie restriction, weight loss and activity for better blood pressure control and prevention of progression of kidney disease    Angiolipoma: no need for immediate followup but within the next year would consider repeat imaging.    Metabolic bone disease; check PTH , vit d , phos serially    Metabolic acidosis:  None  electrolytes stable    HTN: stable      Plan   Labs and urine today   further eval as indicated   rtc 4 mo, labs prior        I spent 40 minutes with the patient of which more than half was spent in coordinating the patient's care as well as in direct consultation with the patient in regards to our treatment and plan.

## 2019-01-22 LAB
ALBUMIN/CREAT UR: 1080.6 UG/MG
BACTERIA #/AREA URNS AUTO: ABNORMAL /HPF
BILIRUB UR QL STRIP: NEGATIVE
CLARITY UR REFRACT.AUTO: CLEAR
COLOR UR AUTO: YELLOW
CREAT UR-MCNC: 31 MG/DL
CREAT UR-MCNC: 31 MG/DL
GLUCOSE UR QL STRIP: ABNORMAL
HGB UR QL STRIP: NEGATIVE
HYALINE CASTS UR QL AUTO: 0 /LPF
KETONES UR QL STRIP: NEGATIVE
LEUKOCYTE ESTERASE UR QL STRIP: ABNORMAL
MICROALBUMIN UR DL<=1MG/L-MCNC: 335 UG/ML
MICROSCOPIC COMMENT: ABNORMAL
NITRITE UR QL STRIP: NEGATIVE
PH UR STRIP: 6 [PH] (ref 5–8)
PROT UR QL STRIP: ABNORMAL
PROT UR-MCNC: 55 MG/DL
PROT/CREAT UR: 1.77 MG/G{CREAT}
RBC #/AREA URNS AUTO: 1 /HPF (ref 0–4)
SP GR UR STRIP: 1.01 (ref 1–1.03)
SQUAMOUS #/AREA URNS AUTO: 2 /HPF
URN SPEC COLLECT METH UR: ABNORMAL
WBC #/AREA URNS AUTO: 17 /HPF (ref 0–5)
YEAST UR QL AUTO: ABNORMAL

## 2019-01-24 ENCOUNTER — TELEPHONE (OUTPATIENT)
Dept: NEPHROLOGY | Facility: CLINIC | Age: 79
End: 2019-01-24

## 2019-01-28 ENCOUNTER — OFFICE VISIT (OUTPATIENT)
Dept: PODIATRY | Facility: CLINIC | Age: 79
End: 2019-01-28
Payer: MEDICARE

## 2019-01-28 VITALS
SYSTOLIC BLOOD PRESSURE: 182 MMHG | DIASTOLIC BLOOD PRESSURE: 78 MMHG | HEART RATE: 92 BPM | WEIGHT: 160 LBS | HEIGHT: 63 IN | BODY MASS INDEX: 28.35 KG/M2

## 2019-01-28 DIAGNOSIS — E11.49 TYPE 2 DIABETES MELLITUS WITH NEUROLOGICAL MANIFESTATIONS: Primary | ICD-10-CM

## 2019-01-28 PROCEDURE — 1101F PR PT FALLS ASSESS DOC 0-1 FALLS W/OUT INJ PAST YR: ICD-10-PCS | Mod: HCNC,CPTII,S$GLB, | Performed by: PODIATRIST

## 2019-01-28 PROCEDURE — 1101F PT FALLS ASSESS-DOCD LE1/YR: CPT | Mod: HCNC,CPTII,S$GLB, | Performed by: PODIATRIST

## 2019-01-28 PROCEDURE — 99213 PR OFFICE/OUTPT VISIT, EST, LEVL III, 20-29 MIN: ICD-10-PCS | Mod: HCNC,S$GLB,, | Performed by: PODIATRIST

## 2019-01-28 PROCEDURE — 3077F PR MOST RECENT SYSTOLIC BLOOD PRESSURE >= 140 MM HG: ICD-10-PCS | Mod: HCNC,CPTII,S$GLB, | Performed by: PODIATRIST

## 2019-01-28 PROCEDURE — 3078F DIAST BP <80 MM HG: CPT | Mod: HCNC,CPTII,S$GLB, | Performed by: PODIATRIST

## 2019-01-28 PROCEDURE — 99999 PR PBB SHADOW E&M-EST. PATIENT-LVL III: ICD-10-PCS | Mod: PBBFAC,HCNC,, | Performed by: PODIATRIST

## 2019-01-28 PROCEDURE — 99213 OFFICE O/P EST LOW 20 MIN: CPT | Mod: HCNC,S$GLB,, | Performed by: PODIATRIST

## 2019-01-28 PROCEDURE — 3078F PR MOST RECENT DIASTOLIC BLOOD PRESSURE < 80 MM HG: ICD-10-PCS | Mod: HCNC,CPTII,S$GLB, | Performed by: PODIATRIST

## 2019-01-28 PROCEDURE — 3077F SYST BP >= 140 MM HG: CPT | Mod: HCNC,CPTII,S$GLB, | Performed by: PODIATRIST

## 2019-01-28 PROCEDURE — 99999 PR PBB SHADOW E&M-EST. PATIENT-LVL III: CPT | Mod: PBBFAC,HCNC,, | Performed by: PODIATRIST

## 2019-01-28 NOTE — PROGRESS NOTES
Subjective:      Patient ID: Myesha Quinones is a 78 y.o. female.    Chief Complaint: Diabetes Mellitus (Dr. Emelina Gaston 1/2/19); Diabetic Foot Exam; and Routine Foot Care    Myesha is a 78 y.o. female who presents to the clinic upon referral from Dr. Gaston  for evaluation and treatment of diabetic feet. 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, with long-term current use of insulin (2/22/2018), COPD (chronic obstructive pulmonary disease), COPD exacerbation (4/8/2018), Defibrillator discharge, Diabetes mellitus, Diabetes mellitus type II, Diabetes with neurologic complications, Goiter, breast cancer, Hyperlipidemia, Hypertension, Iron deficiency anemia (5/16/2017), Left kidney mass, Meningioma, Osteoporosis, postmenopausal, Pacemaker, 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   No major pedal complaints, only complains of some numbness and tingling     PCP: Emelina Gaston MD    Date Last Seen by PCP: 1/2/19    Current shoe gear: Casual shoes    Hemoglobin A1C   Date Value Ref Range Status   12/19/2018 7.1 (H) 4.0 - 5.6 % Final     Comment:     ADA Screening Guidelines:  5.7-6.4%  Consistent with prediabetes  >or=6.5%  Consistent with diabetes  High levels of fetal hemoglobin interfere with the HbA1C  assay. Heterozygous hemoglobin variants (HbS, HgC, etc)do  not significantly interfere  "with this assay.   However, presence of multiple variants may affect accuracy.     08/21/2018 6.8 (H) 4.0 - 5.6 % Final     Comment:     ADA Screening Guidelines:  5.7-6.4%  Consistent with prediabetes  >or=6.5%  Consistent with diabetes  High levels of fetal hemoglobin interfere with the HbA1C  assay. Heterozygous hemoglobin variants (HbS, HgC, etc)do  not significantly interfere with this assay.   However, presence of multiple variants may affect accuracy.     07/09/2018 7.3 (H) 4.0 - 5.6 % Final     Comment:     ADA Screening Guidelines:  5.7-6.4%  Consistent with prediabetes  >or=6.5%  Consistent with diabetes  High levels of fetal hemoglobin interfere with the HbA1C  assay. Heterozygous hemoglobin variants (HbS, HgC, etc)do  not significantly interfere with this assay.   However, presence of multiple variants may affect accuracy.         Vitals:    01/28/19 0842   BP: (!) 182/78   Pulse: 92   Weight: 72.6 kg (160 lb)   Height: 5' 3" (1.6 m)   PainSc: 0-No pain      Past Medical History:   Diagnosis Date    Allergy     Asthma     Basal cell carcinoma     left forehead    Basal cell carcinoma     left nose    Basal cell carcinoma 05/20/2015    right nose    Basal cell carcinoma 12/22/2015    left lower post neck    Breast cancer     CAD (coronary artery disease)     Cardiomyopathy     Cardiomyopathy, ischemic     Cataract     CHF (congestive heart failure)     Chronic kidney disease, stage 3, mod decreased GFR 2/14/2017    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, with long-term current use of insulin 2/22/2018    COPD (chronic obstructive pulmonary disease)     COPD exacerbation 4/8/2018    Defibrillator discharge     Diabetes mellitus     Diabetes mellitus type II     Diabetes with neurologic complications     Goiter     MNG    HX: breast cancer     Hyperlipidemia     Hypertension     Iron deficiency anemia " 5/16/2017    Left kidney mass     Meningioma     Osteoporosis, postmenopausal     Pacemaker     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    Skin cancer     s/p excision    Sleep apnea     CPAP    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/2015    mid forehead    Unspecified vitamin D deficiency     Ventricular tachycardia     Vitamin B12 deficiency     Vitamin D deficiency disease        Past Surgical History:   Procedure Laterality Date    BASAL CELL CARCINOMA EXCISION      posterior neck and nose    BREAST BIOPSY      BREAST CYST EXCISION Left     BREAST SURGERY      CARDIAC DEFIBRILLATOR PLACEMENT      x 2    CATARACT EXTRACTION W/  INTRAOCULAR LENS IMPLANT Bilateral     CHOLECYSTECTOMY      Colonoscopy  N/A 8/27/2014    Performed by Leonidas Vel[facility]quez [facility] at [facility] (4TH FLR)    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    FRACTURE SURGERY      left elbow and wrist as a child    HYSTERECTOMY      MASTECTOMY Right     REPLACEMENT, ICD GENERATOR N/A 12/17/2018    Performed by Jan Mckeon MD at [facility] EP LAB    Revision, Skin Pocket, For Cardioverter-Defibrillator  12/17/2018    Performed by [facility]on [facility] at [facility] EP LAB    SQUAMOUS CELL CARCINOMA EXCISION      remved from forehead    TONSILLECTOMY         Family History   Problem Relation Age of Onset    Diabetes Father     Heart disease Father     Diabetes Sister     Heart disease Sister     Diabetes Brother     Heart disease Brother     Hypertension Brother     Diabetes Brother     Heart disease Brother     Hypertension Brother     Diabetes Brother     Heart disease Brother     Cancer Brother         colon    Diabetes Brother     Cancer Son         skin    Diabetes Son         prediabetes    Diabetes Daughter         prediabetes    Cancer Daughter         melanoma    Obesity Daughter     Melanoma Daughter     Diabetes Son     Asthma Mother     Hypertension Mother     Stroke Mother     No Known Problems Maternal  Grandmother     No Known Problems Maternal Grandfather     No Known Problems Paternal Grandmother     No Known Problems Paternal Grandfather     Amblyopia Neg Hx     Blindness Neg Hx     Cataracts Neg Hx     Glaucoma Neg Hx     Macular degeneration Neg Hx     Retinal detachment Neg Hx     Strabismus Neg Hx     Thyroid disease Neg Hx        Social History     Socioeconomic History    Marital status:      Spouse name: None    Number of children: None    Years of education: None    Highest education level: None   Social Needs    Financial resource strain: None    Food insecurity - worry: None    Food insecurity - inability: None    Transportation needs - medical: None    Transportation needs - non-medical: None   Occupational History    Occupation: Homemaker     Employer: OTHER   Tobacco Use    Smoking status: Never Smoker    Smokeless tobacco: Never Used   Substance and Sexual Activity    Alcohol use: No     Alcohol/week: 0.0 oz    Drug use: No    Sexual activity: Yes     Partners: Male   Other Topics Concern    Are you pregnant or think you may be? No    Breast-feeding No   Social History Narrative    None       Current Outpatient Medications   Medication Sig Dispense Refill    ACCU-CHEK HECTOR CONTROL SOLN Soln       ACCU-CHEK HECTOR PLUS METER Misc       albuterol 90 mcg/actuation inhaler Inhale 2 puffs into the lungs every 6 (six) hours as needed for Wheezing. Rescue 1 Inhaler 12    albuterol-ipratropium (DUO-NEB) 2.5 mg-0.5 mg/3 mL nebulizer solution TAKE 1 VIAL BY NEBULIZATION EVERY 4 (FOUR) HOURS. RESCUE 90 mL 3    aspirin (ENTERIC COATED ASPIRIN) 81 MG EC tablet Take 1 tablet by mouth once daily.       benzonatate (TESSALON PERLES) 100 MG capsule Take 2 capsules (200 mg total) by mouth 3 (three) times daily as needed for Cough. 20 capsule 3    blood sugar diagnostic (ACCU-CHEK HECTOR) Strp Uses Accu-Check Hector meter to test BG 4x/day (Patient taking differently: Uses  Accu-Check Angelica meter to test BG 4x/day checking 2x day) 400 strip 3    carvedilol (COREG) 25 MG tablet Take 2 tablets (50 mg total) by mouth 2 (two) times daily. 360 tablet 3    chlorthalidone (HYGROTEN) 25 MG Tab Take 1 tablet (25 mg total) by mouth once daily. 90 tablet 3    cholecalciferol, vitamin D3, (VITAMIN D3) 2,000 unit Tab Take by mouth once daily.      cloNIDine (CATAPRES) 0.1 MG tablet TAKE 1 TABLET (0.1 MG TOTAL) BY MOUTH EVERY EVENING. 30 tablet 3    CYANOCOBALAMIN, VITAMIN B-12, (B-12 DOTS ORAL) Take 1 tablet by mouth once daily.      fluticasone (FLONASE) 50 mcg/actuation nasal spray 2 sprays (100 mcg total) by Each Nare route once daily. 1 Bottle 12    fluticasone-salmeterol 250-50 mcg/dose (ADVAIR) 250-50 mcg/dose diskus inhaler Inhale 1 puff into the lungs 2 (two) times daily. This is a change from one month 180 each 3    hydrALAZINE (APRESOLINE) 100 MG tablet TAKE 1 TABLET (100 MG TOTAL) BY MOUTH 3 (THREE) TIMES DAILY. 90 tablet 6    insulin (LANTUS SOLOSTAR U-100 INSULIN) glargine 100 units/mL (3mL) SubQ pen Inject 30 Units into the skin every evening. 3 Box 3    irbesartan (AVAPRO) 300 MG tablet Take 1 tablet (300 mg total) by mouth every evening. 90 tablet 3    lancets (ACCU-CHEK SOFTCLIX LANCETS) Misc Uses Accu-Chek Angelica meter to test BG 4x/day 400 each 3    lancets 30 gauge Misc       lancing device Misc       levocetirizine (XYZAL) 5 MG tablet Take 1 tablet (5 mg total) by mouth every evening. 30 tablet 11    linagliptin (TRADJENTA) 5 mg Tab tablet Take 1 tablet (5 mg total) by mouth once daily. 90 tablet 3    magnesium oxide (MAG-OX) 400 mg tablet Take 1 tablet (400 mg total) by mouth once daily.  0    metFORMIN (GLUCOPHAGE) 500 MG tablet Take 2 tablets (1,000 mg total) by mouth 2 (two) times daily with meals. (Patient taking differently: Take 500 mg by mouth 2 (two) times daily with meals. Reduced because of GFR) 360 tablet 3    nitroGLYCERIN (NITROSTAT) 0.4 MG SL  "tablet Place 1 tablet under the tongue continuous prn.        omega-3 fatty acids (FISH OIL) 500 mg Cap Take 1 capsule by mouth Twice daily.      pen needle, diabetic (BD INSULIN PEN NEEDLE UF MINI) 31 gauge x 3/16" Ndle USE WITH LANTUS AT BEDTIME 100 each 3    simvastatin (ZOCOR) 20 MG tablet TAKE 1 TABLET (20 MG TOTAL) BY MOUTH EVERY EVENING. 90 tablet 3    azelastine (ASTELIN) 137 mcg (0.1 %) nasal spray 1 spray (137 mcg total) by Nasal route 2 (two) times daily. For severe allergy 30 mL 1     Current Facility-Administered Medications   Medication Dose Route Frequency Provider Last Rate Last Dose    acetaminophen tablet 650 mg  650 mg Oral Q4H PRN Samy Rodriguez MD         Facility-Administered Medications Ordered in Other Visits   Medication Dose Route Frequency Provider Last Rate Last Dose    0.9%  NaCl infusion   Intravenous Continuous Chela Horne NP           Review of patient's allergies indicates:   Allergen Reactions    Iodine and iodide containing products Hives    Nifedipine      weakness         Review of Systems   Constitution: Negative for chills and fever.   HENT: Positive for congestion.    Cardiovascular: Negative for chest pain and claudication.   Respiratory: Negative for cough and shortness of breath.    Skin: Negative for color change, dry skin, poor wound healing, suspicious lesions and unusual hair distribution.   Musculoskeletal: Negative for falls, joint pain, joint swelling, muscle weakness and myalgias.   Gastrointestinal: Negative for nausea and vomiting.   Neurological: Positive for numbness, paresthesias and sensory change. Negative for loss of balance and tremors.   Psychiatric/Behavioral: Negative for altered mental status. The patient is not nervous/anxious.            Objective:      Physical Exam   Constitutional: She is oriented to person, place, and time. She appears well-developed and well-nourished. No distress.   HENT:   Head: Normocephalic and " atraumatic.   Cardiovascular:   Pulses:       Dorsalis pedis pulses are 2+ on the right side, and 2+ on the left side.        Posterior tibial pulses are 2+ on the right side, and 2+ on the left side.   CFT< 3 secs all toes bilateral foot, skin temp warm bilateral foot, diminished digital hair growth bilateral foot, no lower extremity edema bilateral.     Musculoskeletal: Normal range of motion. She exhibits no edema, tenderness or deformity.   No pain with ROM or MMT bilateral lower extremity.     Feet:   Right Foot:   Protective Sensation: 10 sites tested. 10 sites sensed.   Skin Integrity: Negative for ulcer, blister, skin breakdown, erythema, warmth, callus or dry skin.   Left Foot:   Protective Sensation: 10 sites tested. 10 sites sensed.   Skin Integrity: Negative for ulcer, blister, skin breakdown, erythema, warmth, callus or dry skin.   Neurological: She is alert and oriented to person, place, and time. She has normal strength. She displays no atrophy and no tremor. A sensory deficit is present.   Reflex Scores:       Patellar reflexes are 2+ on the right side and 2+ on the left side.       Achilles reflexes are 2+ on the right side and 2+ on the left side.  Vibratory sensation decreased bilateral foot.    5/5 muscle strength b/l LE   Skin: Skin is dry and intact. Capillary refill takes less than 2 seconds. No rash noted. She is not diaphoretic. No cyanosis or erythema. No pallor. Nails show no clubbing.   Otherwise no calluses, interdigital mascerations, rashes, wounds.    Nails 1-5 bilateral are mildly elongated 1-2 mm, normal thickness and appearance.   Psychiatric: She has a normal mood and affect. Her behavior is normal.             Assessment:       Encounter Diagnosis   Name Primary?    Type 2 diabetes mellitus with neurological manifestations Yes         Plan:       Myesha was seen today for diabetes mellitus, diabetic foot exam and routine foot care.    Diagnoses and all orders for this  visit:    Type 2 diabetes mellitus with neurological manifestations      I counseled the patient on her conditions, their implications and medical management.    Patient evaluated on Diabetic foot risk factors.  Patient counseled to do daily foot checks.  Counseled to wear accommodative shoe gear.  Educated on importance of glycemic control.    Recommend taking Thiamine or complex vitamin B to possibly help with numbness and tingling to feet because Metformin can deplete this vitamin.  Patient to Discuss with Dr. Bolanos    RTC 1 year    Assisted by Josh Buckley, PGY2    I have personally taken the history and examined this patient and agree with the resident's note as stated as above.   Scot Avina DPM, FACFAS

## 2019-01-28 NOTE — PATIENT INSTRUCTIONS
Discuss with Dr. Gaston adding vitamin B1 or thiamine to possibly help with numbness and tingling to feet because Metformin can deplete this vitamin.      Diabetic Foot Care  Diabetes can lead to a number of foot complications. Fortunately, you can prevent most of these with a little extra foot care. If diabetes is not well controlled, it can cause damage to blood vessels and result in poor circulation to the foot. When the skin does not get enough blood flow, it becomes prone to pressure sores and ulcers, which heal slowly.  Diabetes can also damage nerves, interfering with the ability to feel pain and pressure. When you cant feel your foot normally, it is easy to injure your skin, bones, and joints without knowing it. For these reasons diabetes increases the risk of fungal infections, bunions, and ulcers. An ulcer is a sore or break in the skin. With ulcers, often the skin seems to have worn away. Deep ulcers can lead to bone infection.  Gangrene is the most serious foot complication of diabetes. It usually occurs on the tips of the toes as blackened areas of skin. The black area is dead tissue. In severe cases, gangrene spreads to involve the entire toe, other toes, and the entire foot. Foot or toe amputation may be required. Good foot care and blood sugar control can prevent this.  Home care  · Wear comfortable, well-fitting shoes.  · Wash your feet daily with warm water and mild soap.  · After drying, apply a moisturizing cream or lotion to the top and bottom of your feet. Don't put lotion between toes.  · Check your feet daily for skin breaks, blisters, swelling, or redness. Look between your toes as well. If you cannot see the bottoms of your feet, ask someone to look or use a mirror.  · Wear cotton socks and change them every day.  · Trim toenails carefully, and do not cut your cuticles.  · Strive to keep your blood sugar under control with a combination of medicines, diet, and activity.  · If you smoke and  have diabetes, it is very important that you stop. Smoking reduces blood flow to your foot.  · Schedule foot exams at least every year, or more often if you have foot problems.  · Put your feet up when sitting, wiggle toes, and move ankles to help improve blood flow.  Avoid activities that increase your risk of foot injury:  · Do not walk barefoot.  · Do not use heating pads or hot water bottles on your feet.  · Do not put your foot in a hot tub without first checking the temperature with your hand.  Follow-up care  Follow up with your healthcare provider, or as advised. Be sure to take off your shoes and socks before your appointment starts so your healthcare provider will be sure to check your feet. Report any cut, puncture, scrape, blister, or other injury to your foot. Also report if you have a bunion, hammertoes, ingrown toenail, or ulcer on your foot.  When to seek medical advice  Call your healthcare provider right away if any of these occur:  · Black skin color anywhere on the foot  · Open ulcer with pus draining from the wound  · Increasing foot or leg pain  · New areas of redness or swelling or tender areas of the foot  · Fever of 100.4°F (38°C) or greater  Date Last Reviewed: 5/25/2016  © 0011-3987 [employer]. [address] [place] [address], [place] [address]. All rights reserved. This information is not intended as a substitute for professional medical care. Always follow your healthcare professional's instructions.

## 2019-01-28 NOTE — LETTER
January 28, 2019      Emelina Gaston MD  [address]  [facility] [address]           [facility]  [address]  [facility] [address]  Phone: [phone]  Fax: [phone]          Patient: Myesha Quinones   MR Number: [id number]   YOB: 1940   Date of Visit: 1/28/2019       Dear Dr. Emelina Gaston:    Thank you for referring Myesha Quinones to me for evaluation. Attached you will find relevant portions of my assessment and plan of care.    If you have questions, please do not hesitate to call me. I look forward to following Myesha Quinones along with you.    Sincerely,    Scot Avina, DPM    Enclosure  CC:  No Recipients    If you would like to receive this communication electronically, please contact [email or web address] or [phone] to request more information on [facility] [employer] access.    For providers and/or their staff who would like to refer a patient to [facility], please contact us through our one-stop-shop provider referral line, [facility] [place], at [phone].    If you feel you have received this communication in error or would no longer like to receive these types of communications, please e-mail [email or web address]

## 2019-01-29 DIAGNOSIS — N06.8 ISOLATED PROTEINURIA WITH OTHER MORPHOLOGIC LESION: Primary | ICD-10-CM

## 2019-01-29 NOTE — PROGRESS NOTES
NIDDM with retinoapthy, asthma longstanding with eosinophilia, on hydralazine  Serum crt 1.2-1.3---> 1.4-1.5 over the last 2 years  UPRCT 1.77 , Umicroalb/crt 60306 Which is a marked increase from 2017 ( 203)  ZURDO  + 1:320 and ANCA MPO CHERYL = 64     Proteinuria and change in renal function most likely related to DM nephropathy but with  Use of hydralazine, worsening proteinuria and + MPO ANCA  may need to consider other etiologies.  24 hr urine protein, UFE, , renal us,  u c/s in next several days , discussed with patient,   and then will consider risk/benefits of renal biopsy with patient

## 2019-02-01 ENCOUNTER — OFFICE VISIT (OUTPATIENT)
Dept: CARDIOLOGY | Facility: CLINIC | Age: 79
End: 2019-02-01
Payer: MEDICARE

## 2019-02-01 VITALS
HEART RATE: 84 BPM | HEIGHT: 63 IN | WEIGHT: 159.19 LBS | DIASTOLIC BLOOD PRESSURE: 62 MMHG | SYSTOLIC BLOOD PRESSURE: 150 MMHG | BODY MASS INDEX: 28.21 KG/M2

## 2019-02-01 DIAGNOSIS — E78.2 MIXED HYPERLIPIDEMIA: ICD-10-CM

## 2019-02-01 DIAGNOSIS — J45.20 MILD INTERMITTENT CHRONIC ASTHMA WITHOUT COMPLICATION: ICD-10-CM

## 2019-02-01 DIAGNOSIS — N18.30 CHRONIC KIDNEY DISEASE, STAGE 3, MOD DECREASED GFR: ICD-10-CM

## 2019-02-01 DIAGNOSIS — Z95.810 BIVENTRICULAR ICD (IMPLANTABLE CARDIOVERTER-DEFIBRILLATOR) IN PLACE: ICD-10-CM

## 2019-02-01 DIAGNOSIS — I42.8 NONISCHEMIC CARDIOMYOPATHY: Primary | ICD-10-CM

## 2019-02-01 DIAGNOSIS — I10 ESSENTIAL HYPERTENSION: ICD-10-CM

## 2019-02-01 DIAGNOSIS — G47.33 OSA (OBSTRUCTIVE SLEEP APNEA): ICD-10-CM

## 2019-02-01 DIAGNOSIS — I50.22 CHRONIC SYSTOLIC HEART FAILURE: ICD-10-CM

## 2019-02-01 PROCEDURE — 1101F PT FALLS ASSESS-DOCD LE1/YR: CPT | Mod: HCNC,CPTII,S$GLB, | Performed by: INTERNAL MEDICINE

## 2019-02-01 PROCEDURE — 3077F SYST BP >= 140 MM HG: CPT | Mod: HCNC,CPTII,S$GLB, | Performed by: INTERNAL MEDICINE

## 2019-02-01 PROCEDURE — 3078F PR MOST RECENT DIASTOLIC BLOOD PRESSURE < 80 MM HG: ICD-10-PCS | Mod: HCNC,CPTII,S$GLB, | Performed by: INTERNAL MEDICINE

## 2019-02-01 PROCEDURE — 3078F DIAST BP <80 MM HG: CPT | Mod: HCNC,CPTII,S$GLB, | Performed by: INTERNAL MEDICINE

## 2019-02-01 PROCEDURE — 1101F PR PT FALLS ASSESS DOC 0-1 FALLS W/OUT INJ PAST YR: ICD-10-PCS | Mod: HCNC,CPTII,S$GLB, | Performed by: INTERNAL MEDICINE

## 2019-02-01 PROCEDURE — 99214 PR OFFICE/OUTPT VISIT, EST, LEVL IV, 30-39 MIN: ICD-10-PCS | Mod: HCNC,S$GLB,, | Performed by: INTERNAL MEDICINE

## 2019-02-01 PROCEDURE — 99999 PR PBB SHADOW E&M-EST. PATIENT-LVL III: CPT | Mod: PBBFAC,HCNC,, | Performed by: INTERNAL MEDICINE

## 2019-02-01 PROCEDURE — 99214 OFFICE O/P EST MOD 30 MIN: CPT | Mod: HCNC,S$GLB,, | Performed by: INTERNAL MEDICINE

## 2019-02-01 PROCEDURE — 3077F PR MOST RECENT SYSTOLIC BLOOD PRESSURE >= 140 MM HG: ICD-10-PCS | Mod: HCNC,CPTII,S$GLB, | Performed by: INTERNAL MEDICINE

## 2019-02-01 PROCEDURE — 99999 PR PBB SHADOW E&M-EST. PATIENT-LVL III: ICD-10-PCS | Mod: PBBFAC,HCNC,, | Performed by: INTERNAL MEDICINE

## 2019-02-01 RX ORDER — CLONIDINE 0.1 MG/24H
1 PATCH, EXTENDED RELEASE TRANSDERMAL
Qty: 4 PATCH | Refills: 11 | Status: ON HOLD | OUTPATIENT
Start: 2019-02-01 | End: 2019-12-21 | Stop reason: SDUPTHER

## 2019-02-01 NOTE — PATIENT INSTRUCTIONS
Continue current regimen including weighing daily except change Clonidine to a clonidine patch once a week.  Mediterranean Diet recommended with carbohydrate and sodium restriction

## 2019-02-01 NOTE — PROGRESS NOTES
Subjective:   Patient ID:  Myesha Quinones is a 78 y.o. female who presents for follow-up of Cardiomyopathy (6 month f/u )      HPI:   Myesha Quinones presents for follow up of non-ischemic cardiomyopathy and hypertension.  With CRT her  EF has improved to 50-55%. She is currently having PELAYO but feels it is due to her asthma. Weight has not changed. She is in the Digital monitoring Program and her BP has been variable . Elevated today. Nifedipine caused her to feel weak. Myesha Quinones denies chest pain,  palpitations, presyncope , or syncope. Myesha Quinones has dyslipidemia  on moderate intensity statin therapy At LDL goal.Myesha Quinones chronic kidney disease stage III and is currently being evaluated for proteinuria.  Review of Systems   Constitution: Negative for weakness, malaise/fatigue, weight gain and weight loss.   Eyes: Negative for blurred vision.   Cardiovascular: Positive for dyspnea on exertion. Negative for chest pain, claudication, cyanosis, irregular heartbeat, leg swelling, near-syncope, orthopnea, palpitations, paroxysmal nocturnal dyspnea and syncope.   Respiratory: Positive for wheezing. Negative for cough and shortness of breath.              KHOA on CPAP    Musculoskeletal: Positive for joint pain. Negative for falls and myalgias.   Gastrointestinal: Positive for constipation. Negative for abdominal pain, heartburn, nausea and vomiting.   Genitourinary: Negative for nocturia.   Neurological: Negative for brief paralysis, dizziness, focal weakness, headaches, numbness and paresthesias.   Psychiatric/Behavioral: Negative for altered mental status.       Current Outpatient Medications   Medication Sig    ACCU-CHEK HECTOR CONTROL SOLN Soln     ACCU-CHEK HECTOR PLUS METER Misc     albuterol 90 mcg/actuation inhaler Inhale 2 puffs into the lungs every 6 (six) hours as needed for Wheezing. Rescue    albuterol-ipratropium (DUO-NEB) 2.5 mg-0.5 mg/3 mL nebulizer solution  TAKE 1 VIAL BY NEBULIZATION EVERY 4 (FOUR) HOURS. RESCUE    aspirin (ENTERIC COATED ASPIRIN) 81 MG EC tablet Take 1 tablet by mouth once daily.     benzonatate (TESSALON PERLES) 100 MG capsule Take 2 capsules (200 mg total) by mouth 3 (three) times daily as needed for Cough.    blood sugar diagnostic (ACCU-CHEK HECTOR) Strp Uses Accu-Check Hector meter to test BG 4x/day (Patient taking differently: Uses Accu-Check Hector meter to test BG 4x/day checking 2x day)    carvedilol (COREG) 25 MG tablet Take 2 tablets (50 mg total) by mouth 2 (two) times daily.    chlorthalidone (HYGROTEN) 25 MG Tab Take 1 tablet (25 mg total) by mouth once daily.    cholecalciferol, vitamin D3, (VITAMIN D3) 2,000 unit Tab Take by mouth once daily.    CYANOCOBALAMIN, VITAMIN B-12, (B-12 DOTS ORAL) Take 1 tablet by mouth once daily.    fluticasone (FLONASE) 50 mcg/actuation nasal spray 2 sprays (100 mcg total) by Each Nare route once daily.    fluticasone-salmeterol 250-50 mcg/dose (ADVAIR) 250-50 mcg/dose diskus inhaler Inhale 1 puff into the lungs 2 (two) times daily. This is a change from one month    hydrALAZINE (APRESOLINE) 100 MG tablet TAKE 1 TABLET (100 MG TOTAL) BY MOUTH 3 (THREE) TIMES DAILY.    insulin (LANTUS SOLOSTAR U-100 INSULIN) glargine 100 units/mL (3mL) SubQ pen Inject 30 Units into the skin every evening.    irbesartan (AVAPRO) 300 MG tablet Take 1 tablet (300 mg total) by mouth every evening.    lancets (ACCU-CHEK SOFTCLIX LANCETS) Misc Uses Accu-Chek Hector meter to test BG 4x/day    lancets 30 gauge Misc     lancing device Misc     levocetirizine (XYZAL) 5 MG tablet Take 1 tablet (5 mg total) by mouth every evening.    linagliptin (TRADJENTA) 5 mg Tab tablet Take 1 tablet (5 mg total) by mouth once daily.    magnesium oxide (MAG-OX) 400 mg tablet Take 1 tablet (400 mg total) by mouth once daily.    metFORMIN (GLUCOPHAGE) 500 MG tablet Take 2 tablets (1,000 mg total) by mouth 2 (two) times daily with  "meals. (Patient taking differently: Take 500 mg by mouth 2 (two) times daily with meals. Reduced because of GFR)    nitroGLYCERIN (NITROSTAT) 0.4 MG SL tablet Place 1 tablet under the tongue continuous prn.      omega-3 fatty acids (FISH OIL) 500 mg Cap Take 1 capsule by mouth Twice daily.    pen needle, diabetic (BD INSULIN PEN NEEDLE UF MINI) 31 gauge x 3/16" Ndle USE WITH LANTUS AT BEDTIME    simvastatin (ZOCOR) 20 MG tablet TAKE 1 TABLET (20 MG TOTAL) BY MOUTH EVERY EVENING.    azelastine (ASTELIN) 137 mcg (0.1 %) nasal spray 1 spray (137 mcg total) by Nasal route 2 (two) times daily. For severe allergy    cloNIDine 0.1 mg/24 hr td ptwk (CATAPRES) 0.1 mg/24 hr Place 1 patch onto the skin every 7 days.     Current Facility-Administered Medications   Medication    acetaminophen tablet 650 mg     Facility-Administered Medications Ordered in Other Visits   Medication    0.9%  NaCl infusion     Objective:   Physical Exam   Constitutional: She is oriented to person, place, and time. She appears well-developed. No distress.   BP (!) 150/62 (BP Location: Right arm, Patient Position: Sitting, BP Method: Large (Automatic))   Pulse 84   Ht 5' 3" (1.6 m)   Wt 72.2 kg (159 lb 2.8 oz)   BMI 28.20 kg/m²    HENT:   Head: Normocephalic.   Eyes: Conjunctivae are normal. Pupils are equal, round, and reactive to light. No scleral icterus.   Neck: Neck supple. No JVD present. No thyromegaly present.   Cardiovascular: Normal rate, regular rhythm, normal heart sounds and intact distal pulses. PMI is not displaced. Exam reveals no gallop and no friction rub.   No murmur heard.  1+ pedal edema on the left only.    Pulmonary/Chest: Effort normal. No respiratory distress. She has wheezes. She has no rales.   Right mastectomy     Abdominal: Soft. She exhibits no distension. There is no splenomegaly or hepatomegaly. There is no tenderness.   Musculoskeletal: She exhibits no edema or tenderness.   Gait normal  Right arm " lymphedema     Neurological: She is alert and oriented to person, place, and time.   Skin: Skin is warm and dry. She is not diaphoretic.   Psychiatric: She has a normal mood and affect. Her behavior is normal.       Lab Results   Component Value Date     01/21/2019    K 4.0 01/21/2019     01/21/2019    CO2 27 01/21/2019    BUN 31 (H) 01/21/2019    CREATININE 1.4 01/21/2019     (H) 01/21/2019    HGBA1C 7.1 (H) 12/19/2018    MG 1.8 04/08/2018    AST 17 12/19/2018    ALT 18 12/19/2018    ALBUMIN 3.7 01/21/2019    PROT 7.5 12/19/2018    BILITOT 0.4 12/19/2018    WBC 10.75 12/19/2018    HGB 11.1 (L) 12/19/2018    HCT 35.7 (L) 12/19/2018    MCV 93 12/19/2018     12/19/2018    TSH 1.127 12/19/2018    CHOL 112 (L) 12/19/2018    HDL 49 12/19/2018    LDLCALC 43.4 (L) 12/19/2018    TRIG 98 12/19/2018       Assessment:     1. Nonischemic cardiomyopathy : EF 50-55%   2. Chronic systolic heart failure : Currently compensated   3. Biventricular ICD (implantable cardioverter-defibrillator) in place    4. Essential hypertension ; BP swings   5. Mixed hyperlipidemia : At LDL goal   6. KHOA (obstructive sleep apnea) : Uses Mask   7. Mild intermittent chronic asthma without complication: Current eacerbation    8       CKD III: Proteinuria    Plan:     Myesha was seen today for cardiomyopathy.    Diagnoses and all orders for this visit:    Nonischemic cardiomyopathy    Chronic systolic heart failure  Continue current regimen including daily weights    Biventricular ICD (implantable cardioverter-defibrillator) in place    Essential hypertension  -     cloNIDine 0.1 mg/24 hr td ptwk (CATAPRES) 0.1 mg/24 hr; Place 1 patch onto the skin every 7 days. In place of nocturnal Clonidine    Mixed hyperlipidemia  Continue current regimen  Mediterranean Diet recommended with carbohydrate restriction  KHOA (obstructive sleep apnea)  Continue current regimen    Mild intermittent chronic asthma without complication  Per her  Pulmonologist  Chronic kidney disease, stage 3, mod decreased GFR

## 2019-02-08 ENCOUNTER — LAB VISIT (OUTPATIENT)
Dept: LAB | Facility: HOSPITAL | Age: 79
End: 2019-02-08
Attending: INTERNAL MEDICINE
Payer: MEDICARE

## 2019-02-08 DIAGNOSIS — N06.8 ISOLATED PROTEINURIA WITH OTHER MORPHOLOGIC LESION: ICD-10-CM

## 2019-02-08 PROCEDURE — 87086 URINE CULTURE/COLONY COUNT: CPT | Mod: HCNC

## 2019-02-10 LAB
BACTERIA UR CULT: NORMAL
BACTERIA UR CULT: NORMAL

## 2019-02-13 ENCOUNTER — HOSPITAL ENCOUNTER (OUTPATIENT)
Dept: RADIOLOGY | Facility: HOSPITAL | Age: 79
Discharge: HOME | End: 2019-02-13
Attending: INTERNAL MEDICINE
Payer: MEDICARE

## 2019-02-13 DIAGNOSIS — N06.8 ISOLATED PROTEINURIA WITH OTHER MORPHOLOGIC LESION: ICD-10-CM

## 2019-02-13 PROCEDURE — 76770 US EXAM ABDO BACK WALL COMP: CPT | Mod: TC,HCNC

## 2019-02-13 PROCEDURE — 76770 US EXAM ABDO BACK WALL COMP: CPT | Mod: 26,HCNC,, | Performed by: RADIOLOGY

## 2019-02-13 PROCEDURE — 76770 US RETROPERITONEAL COMPLETE: ICD-10-PCS | Mod: 26,HCNC,, | Performed by: RADIOLOGY

## 2019-02-14 ENCOUNTER — PROCEDURE VISIT (OUTPATIENT)
Dept: OPHTHALMOLOGY | Facility: CLINIC | Age: 79
End: 2019-02-14
Attending: OPHTHALMOLOGY
Payer: MEDICARE

## 2019-02-14 VITALS — DIASTOLIC BLOOD PRESSURE: 63 MMHG | HEART RATE: 90 BPM | SYSTOLIC BLOOD PRESSURE: 137 MMHG

## 2019-02-14 DIAGNOSIS — E11.3213 CONTROLLED TYPE 2 DIABETES MELLITUS WITH BOTH EYES AFFECTED BY MILD NONPROLIFERATIVE RETINOPATHY AND MACULAR EDEMA, WITH LONG-TERM CURRENT USE OF INSULIN: Primary | ICD-10-CM

## 2019-02-14 DIAGNOSIS — H35.033 HYPERTENSIVE RETINOPATHY, BILATERAL: ICD-10-CM

## 2019-02-14 DIAGNOSIS — Z79.4 CONTROLLED TYPE 2 DIABETES MELLITUS WITH BOTH EYES AFFECTED BY MILD NONPROLIFERATIVE RETINOPATHY AND MACULAR EDEMA, WITH LONG-TERM CURRENT USE OF INSULIN: Primary | ICD-10-CM

## 2019-02-14 PROCEDURE — 92134 CPTRZ OPH DX IMG PST SGM RTA: CPT | Mod: HCNC,S$GLB,, | Performed by: OPHTHALMOLOGY

## 2019-02-14 PROCEDURE — 92014 PR EYE EXAM, EST PATIENT,COMPREHESV: ICD-10-PCS | Mod: HCNC,S$GLB,, | Performed by: OPHTHALMOLOGY

## 2019-02-14 PROCEDURE — 92226 PR SPECIAL EYE EXAM, SUBSEQUENT: ICD-10-PCS | Mod: 50,HCNC,S$GLB, | Performed by: OPHTHALMOLOGY

## 2019-02-14 PROCEDURE — 92226 PR SPECIAL EYE EXAM, SUBSEQUENT: CPT | Mod: 50,HCNC,S$GLB, | Performed by: OPHTHALMOLOGY

## 2019-02-14 PROCEDURE — 92134 POSTERIOR SEGMENT OCT RETINA (OCULAR COHERENCE TOMOGRAPHY)-BOTH EYES: ICD-10-PCS | Mod: HCNC,S$GLB,, | Performed by: OPHTHALMOLOGY

## 2019-02-14 PROCEDURE — 92014 COMPRE OPH EXAM EST PT 1/>: CPT | Mod: HCNC,S$GLB,, | Performed by: OPHTHALMOLOGY

## 2019-02-14 NOTE — PROGRESS NOTES
"HPI     Eye Problem      Additional comments: 8 week check              Comments     DLS 12/13/18 -- For a while she was seeing floaters but they have gone away    HPI     DLS 10/11/18  Pt seem to think her vision is a little better. No flashes no floaters no blurred vision.         OCT - OD No ME  OS - central increase    Prior FA - Foveal MA's OS   No NV of NP OU      A/P    1. 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 DME OS  S/p Avastin OS x 5  S/p Ozurdex OS x2 12/18  Foveal MA"s OS - may need chronic pharmacotherapy    Doing well, may need chroic steroid    Get approval for Iluvien    3. HTN Ret OU  BS/BP/chol control    4. PCIOL OU  With small PCO      2 month OCT  "

## 2019-02-18 RX ORDER — CARVEDILOL 25 MG/1
50 TABLET ORAL 2 TIMES DAILY
Qty: 360 TABLET | Refills: 3 | Status: ON HOLD | OUTPATIENT
Start: 2019-02-18 | End: 2019-12-10 | Stop reason: SDUPTHER

## 2019-02-20 ENCOUNTER — DOCUMENTATION ONLY (OUTPATIENT)
Dept: NEPHROLOGY | Facility: CLINIC | Age: 79
End: 2019-02-20

## 2019-02-20 DIAGNOSIS — N18.30 TYPE 2 DIABETES MELLITUS WITH STAGE 3 CHRONIC KIDNEY DISEASE, WITH LONG-TERM CURRENT USE OF INSULIN: Primary | ICD-10-CM

## 2019-02-20 DIAGNOSIS — Z79.4 TYPE 2 DIABETES MELLITUS WITH STAGE 3 CHRONIC KIDNEY DISEASE, WITH LONG-TERM CURRENT USE OF INSULIN: Primary | ICD-10-CM

## 2019-02-20 DIAGNOSIS — E11.22 TYPE 2 DIABETES MELLITUS WITH STAGE 3 CHRONIC KIDNEY DISEASE, WITH LONG-TERM CURRENT USE OF INSULIN: Primary | ICD-10-CM

## 2019-02-20 DIAGNOSIS — N06.9 ISOLATED PROTEINURIA WITH MORPHOLOGIC LESION: ICD-10-CM

## 2019-02-20 NOTE — PROGRESS NOTES
MPO EIA anca positive at 64 ( nl < 20)  no systemic symptoms of vasculiitis  +longstanding asthma  Proteinuria and renal function with subtle increases over the last 18-24 months  that most likely are related to DM nephropathy     repeat anca, anti-gbm, c3, c4 ordered    Risks benefits of renal biopsy discussed with patient, awaiting her reply

## 2019-02-25 ENCOUNTER — CLINICAL SUPPORT (OUTPATIENT)
Dept: OTOLARYNGOLOGY | Facility: CLINIC | Age: 79
End: 2019-02-25
Payer: MEDICARE

## 2019-02-25 DIAGNOSIS — R29.2 ABNORMAL ACOUSTIC REFLEX: ICD-10-CM

## 2019-02-25 DIAGNOSIS — H90.A32 MIXED CONDUCTIVE AND SENSORINEURAL HEARING LOSS OF LEFT EAR WITH RESTRICTED HEARING OF RIGHT EAR: Primary | ICD-10-CM

## 2019-02-25 DIAGNOSIS — H74.8X2 TYPE B TYMPANOGRAM, LEFT: ICD-10-CM

## 2019-02-25 DIAGNOSIS — H93.8X2 SENSATION OF FULLNESS IN LEFT EAR: ICD-10-CM

## 2019-02-25 DIAGNOSIS — Z97.4 WEARS HEARING AID IN BOTH EARS: ICD-10-CM

## 2019-02-25 DIAGNOSIS — H90.A21 SENSORINEURAL HEARING LOSS (SNHL) OF RIGHT EAR WITH RESTRICTED HEARING OF LEFT EAR: ICD-10-CM

## 2019-02-25 DIAGNOSIS — Z46.1 ENCOUNTER FOR FITTING AND ADJUSTMENT OF HEARING AID OF BOTH EARS: Primary | ICD-10-CM

## 2019-02-25 DIAGNOSIS — Z82.2 FAMILY HISTORY OF HEARING LOSS: ICD-10-CM

## 2019-02-25 PROCEDURE — 92550 TYMPANOMETRY & REFLEX THRESH: CPT | Mod: S$GLB,,, | Performed by: AUDIOLOGIST-HEARING AID FITTER

## 2019-02-25 PROCEDURE — 99499 NO LOS: ICD-10-PCS | Mod: S$GLB,,, | Performed by: AUDIOLOGIST-HEARING AID FITTER

## 2019-02-25 PROCEDURE — 92557 COMPREHENSIVE HEARING TEST: CPT | Mod: S$GLB,,, | Performed by: AUDIOLOGIST-HEARING AID FITTER

## 2019-02-25 PROCEDURE — 92557 PR COMPREHENSIVE HEARING TEST: ICD-10-PCS | Mod: S$GLB,,, | Performed by: AUDIOLOGIST-HEARING AID FITTER

## 2019-02-25 PROCEDURE — 92550 PR TYMPANOMETRY AND REFLEX THRESHOLD MEASUREMENTS: ICD-10-PCS | Mod: S$GLB,,, | Performed by: AUDIOLOGIST-HEARING AID FITTER

## 2019-02-25 PROCEDURE — 99499 UNLISTED E&M SERVICE: CPT | Mod: S$GLB,,, | Performed by: AUDIOLOGIST-HEARING AID FITTER

## 2019-02-25 NOTE — Clinical Note
Your patient Myesha Quinones was recently seen for an audiological evaluation.  I have included my assessment and recommendations.  If I can be of further assistance, please let me know.Sincerely,Susannah Carter, CCC-A

## 2019-02-25 NOTE — PROGRESS NOTES
Susannah Carter, CCC-A  Audiologist - [facility] [address] [place] [address]  [email or web address]  [phone]    Patient: Myesha Quinones   MRN: [id number]  [facility]  Home Phone [phone]   Work Phone Not on file.   Mobile [phone]   : 1940  PELAYO: 2019      HEARING AID FOLLOW-UP      Mrs. Myesha Quinones is here today for her annual audiogram and hearing aid check.  See audiogram in chart - no changes noted in audiogram since completed audiogram last year on 18.  Connected hearing aids to save settings - read instrument data and audiogram only and saved without any changes.  ReSound [id number] (#[id number] & [id number]) purchased in , with warranty for repair and loss/damage expiring on 2019.  Right hearing aid is attached to current micromold #[id number].  Patient still has new mold for backup if needed, #[id number].  Left hearing aid is attached to an encased micromold (UP) #[id number].      Counseled regarding upcoming warranty expiration.  Listening check on Right ear sounds good, cleaned hearing aid, changed out cerustop, used air to clear debris from microphones - she states it sounds better since cleaning.  Listening check on Left hearing aid is good - cleaned  and changed out cerustop - cleaned microphones with air and listening check sounds good again.      _______________________________  Susannah Carter, WINDY-A  Audiologist

## 2019-02-26 ENCOUNTER — OFFICE VISIT (OUTPATIENT)
Dept: OTOLARYNGOLOGY | Facility: CLINIC | Age: 79
End: 2019-02-26
Payer: MEDICARE

## 2019-02-26 VITALS
WEIGHT: 158.75 LBS | DIASTOLIC BLOOD PRESSURE: 81 MMHG | HEART RATE: 93 BPM | SYSTOLIC BLOOD PRESSURE: 171 MMHG | HEIGHT: 63 IN | BODY MASS INDEX: 28.13 KG/M2

## 2019-02-26 DIAGNOSIS — H83.8X2 SUPERIOR SEMICIRCULAR CANAL DEHISCENCE OF LEFT EAR: ICD-10-CM

## 2019-02-26 DIAGNOSIS — H69.92 ETD (EUSTACHIAN TUBE DYSFUNCTION), LEFT: ICD-10-CM

## 2019-02-26 DIAGNOSIS — H90.A32 MIXED CONDUCTIVE AND SENSORINEURAL HEARING LOSS OF LEFT EAR WITH RESTRICTED HEARING OF RIGHT EAR: Primary | ICD-10-CM

## 2019-02-26 DIAGNOSIS — J30.89 NON-SEASONAL ALLERGIC RHINITIS, UNSPECIFIED TRIGGER: ICD-10-CM

## 2019-02-26 PROCEDURE — 99999 PR PBB SHADOW E&M-EST. PATIENT-LVL IV: CPT | Mod: PBBFAC,HCNC,, | Performed by: OTOLARYNGOLOGY

## 2019-02-26 PROCEDURE — 3079F PR MOST RECENT DIASTOLIC BLOOD PRESSURE 80-89 MM HG: ICD-10-PCS | Mod: HCNC,CPTII,S$GLB, | Performed by: OTOLARYNGOLOGY

## 2019-02-26 PROCEDURE — 99999 PR PBB SHADOW E&M-EST. PATIENT-LVL IV: ICD-10-PCS | Mod: PBBFAC,HCNC,, | Performed by: OTOLARYNGOLOGY

## 2019-02-26 PROCEDURE — 1101F PR PT FALLS ASSESS DOC 0-1 FALLS W/OUT INJ PAST YR: ICD-10-PCS | Mod: HCNC,CPTII,S$GLB, | Performed by: OTOLARYNGOLOGY

## 2019-02-26 PROCEDURE — 1101F PT FALLS ASSESS-DOCD LE1/YR: CPT | Mod: HCNC,CPTII,S$GLB, | Performed by: OTOLARYNGOLOGY

## 2019-02-26 PROCEDURE — 3077F PR MOST RECENT SYSTOLIC BLOOD PRESSURE >= 140 MM HG: ICD-10-PCS | Mod: HCNC,CPTII,S$GLB, | Performed by: OTOLARYNGOLOGY

## 2019-02-26 PROCEDURE — 99213 OFFICE O/P EST LOW 20 MIN: CPT | Mod: HCNC,S$GLB,, | Performed by: OTOLARYNGOLOGY

## 2019-02-26 PROCEDURE — 99213 PR OFFICE/OUTPT VISIT, EST, LEVL III, 20-29 MIN: ICD-10-PCS | Mod: HCNC,S$GLB,, | Performed by: OTOLARYNGOLOGY

## 2019-02-26 PROCEDURE — 3079F DIAST BP 80-89 MM HG: CPT | Mod: HCNC,CPTII,S$GLB, | Performed by: OTOLARYNGOLOGY

## 2019-02-26 PROCEDURE — 3077F SYST BP >= 140 MM HG: CPT | Mod: HCNC,CPTII,S$GLB, | Performed by: OTOLARYNGOLOGY

## 2019-02-26 NOTE — PROGRESS NOTES
"  Chief Complaint   Patient presents with    Follow-up    Ear Fullness     left ear     Ear Drainage     left ear, patient reports drainage comes and goes   .     HPI:  Myesha Quinones is a very pleasant 78 y.o. female here to see me today for the first time for evaluation of hearing loss.  She states that she has had lifelong ear problems.  She reports that as a child she pulled a cup of scalding hot coffee on and inside her left ear which resulted in an eardrum perforation. She states ever since this incident she "has never had normal hearing or ear."  She relays that in 1998 she had breast cancer and had hearing loss associated with the chemotherapy and obtained her first set of hearing aids in 1999.  She is here today to establish with ENT as her prior ENT Dr. Tanner no longer takes her insurance. She was referred for evaluation of her ears and for to evaluate for cerumen removal as her hearing aids have not been working as well recently.  She reports hearing loss that has been gradually progressing over the last several years.  She has not had any recent issues with ear pain or ear drainage.  She denies a family history of hearing loss, and has not had any previous otologic surgery.  She denies any history of significant loud noise exposure.She denies issues with dizziness.    Interval HPI 2/26/2019:    Follow up SCC dehiscence, AR  She reports that her hearing is unchanged.  She saw her audiologist yesterday and had audiogram and hearing aids were checked.   She continues on Astelin and Claritin as prescribed. She feels this helps with her nasal congestion and intermittent aural fullness.  She denies vertigo.       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, with long-term current use of insulin 2/22/2018    COPD (chronic obstructive pulmonary disease)     COPD exacerbation 4/8/2018    Defibrillator discharge     Diabetes mellitus     Diabetes mellitus type II     Diabetes with neurologic complications     Goiter     MNG    HX: breast cancer     Hyperlipidemia     Hypertension     Iron deficiency anemia 5/16/2017    Left kidney mass     Meningioma     Osteoporosis, postmenopausal     Pacemaker     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    Skin cancer     s/p excision    Sleep apnea     CPAP    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/2015    mid forehead    Unspecified vitamin D deficiency     Ventricular tachycardia     Vitamin B12 deficiency     Vitamin D deficiency disease      Social History     Socioeconomic History    Marital status:      Spouse name: Not on file    Number of children: Not on file    Years of education: Not on file    Highest education level: Not on file   Social Needs    Financial resource strain: Not on file    Food insecurity - worry: Not on file    Food insecurity - inability: Not on file    Transportation needs - medical: Not on file    Transportation needs - non-medical: Not on file   Occupational History    Occupation: Homemaker     Employer: OTHER   Tobacco Use    Smoking status: Never Smoker    Smokeless tobacco: Never Used   Substance and Sexual Activity    Alcohol use: No     Alcohol/week: 0.0 oz    Drug use: No    Sexual activity: Yes     Partners: Male   Other Topics Concern    Are you pregnant or think you may be? 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    FRACTURE SURGERY      left elbow and wrist as a child    HYSTERECTOMY      MASTECTOMY Right     REPLACEMENT, ICD GENERATOR N/A 12/17/2018    Performed by Jan Mckeon MD at [facility] EP LAB    Revision, Skin Pocket, For Cardioverter-Defibrillator  12/17/2018    Performed by Jan Mckeon MD at [facility] EP LAB    SQUAMOUS CELL CARCINOMA EXCISION      remved from forehead    TONSILLECTOMY       Family History   Problem Relation Age of Onset    Diabetes Father     Heart disease Father     Diabetes Sister     Heart disease Sister     Diabetes Brother     Heart disease Brother     Hypertension Brother     Diabetes Brother     Heart disease Brother     Hypertension Brother     Diabetes Brother     Heart disease Brother     Cancer Brother         colon    Diabetes Brother     Cancer Son         skin    Diabetes Son         prediabetes    Diabetes Daughter         prediabetes    Cancer Daughter         melanoma    Obesity Daughter     Melanoma Daughter     Diabetes Son     Asthma Mother     Hypertension Mother     Stroke Mother     No Known Problems Maternal Grandmother     No Known Problems Maternal Grandfather     No Known Problems Paternal Grandmother     No Known Problems Paternal Grandfather     Amblyopia Neg Hx     Blindness Neg Hx     Cataracts Neg Hx     Glaucoma Neg Hx     Macular degeneration Neg Hx     Retinal detachment Neg Hx     Strabismus Neg Hx     Thyroid disease Neg Hx          Review of Systems  General: negative for  chills, fever or weight loss  Psychological: negative for mood changes or depression  Ophthalmic: negative for blurry vision, photophobia or eye pain  ENT: see HPI  Respiratory: no cough, shortness of breath, or wheezing  Cardiovascular: no chest pain or dyspnea on exertion  Gastrointestinal: no abdominal pain, change in bowel habits, or black/ bloody stools  Musculoskeletal: negative for gait disturbance or muscular weakness  Neurological: no syncope or seizures; no ataxia  Dermatological: negative for puritis,  rash and jaundice  Hematologic/lymphatic: no easy bruising, no new lumps or bumps      Physical Exam:    Vitals:    02/26/19 1006   BP: (!) 171/81   Pulse: 93       Constitutional: Well appearing / communicating without difficutly.  NAD.  Eyes: EOM I Bilaterally  Head/Face: Normocephalic.  Negative paranasal sinus pressure/tenderness.  Salivary glands WNL.  House Brackmann I Bilaterally.    Right Ear: Auricle normal appearance. External Auditory Canal within normal limits no lesions or masses,TM w/o masses/lesions/perforationsp; monomeric segment centrally.  TM mobility noted.   Left Ear: Auricle normal appearance. External Auditory Canal within normal limits no lesions or masses,TM w/ thickened with tympanosclerosis present.  No effusion. TM mobility reduced.   Rinne Air conduction >bone conduction bilaterally, Herrera midline.   Nose: No gross nasal septal deviation. Inferior Turbinates 3+ bilaterally. No septal perforation. No masses/lesions. External nasal skin appears normal without masses/lesions.  Oral Cavity: Gingiva/lips within normal limits.  Dentition/gingiva healthy appearing. Mucus membranes moist. Floor of mouth soft, no masses palpated. Oral Tongue mobile. Hard Palate appears normal.    Oropharynx: Base of tongue appears normal. No masses/lesions noted. Tonsillar fossa/pharyngeal wall without lesions. Posterior oropharynx WNL.  Soft palate without masses. Midline uvula.   Neck/Lymphatic: No LAD  I-VI bilaterally.  No thyromegaly.  No masses noted on exam.    Mirror laryngoscopy/nasopharyngoscopy: Active gag reflex.  Unable to perform.    Neuro/Psychiatric: AOx3.  Normal mood and affect.   Cardiovascular: Normal carotid pulses bilaterally, no increasing jugular venous distention noted at cervical region bilaterally.    Respiratory: Normal respiratory effort, no stridor, no retractions noted.    Diagnostic testing ordered and reviewed:    CT temporal bones 1/31/2018:   Mild soft tissue thickening of the left tympanic membrane.  No middle ear/ mastoid involvement.    Mild dehiscence of the left superior semicircular canal. Right temporal bone unremarkable.      Assessment:    ICD-10-CM ICD-9-CM    1. Mixed conductive and sensorineural hearing loss of left ear with restricted hearing of right ear H90.A32 389.22    2. 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 ETD (Eustachian tube dysfunction), left H69.82 381.81    4. 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      Plan:  No orders of the defined types were placed in this encounter.    We reviewed her diagnosis of left semicircular canal dehiscence and how this affects the hearing.  I revisited options for treatment including continuing conservative measure of amplification via hearing aids versus surgical management of semicircular canal dehiscence. We discussed the risk/benefits and overall expected outcomes of each option. The patient wishes to continue with hearing aids.  Next audiogram due Feb.. 2020.    Chronic rhinitis. Continue Flonase and  Xyzal.       Follow up  6 months    Brittany Metcalf MD

## 2019-02-26 NOTE — PROGRESS NOTES
I, Jagruti Medina, have personally taken the history and physical exam and I agree with Dr. Yates's assessment and plan.

## 2019-02-26 NOTE — PROGRESS NOTES
Susannah Carter, CCC-A  Audiologist - [facility] [address] [place] [address]  [email or web address]  [phone]    Patient: Myesha Quinones   MRN: [id number]  : 1940  PELAYO: 2019      AUDIOLOGICAL EVALUATION        RECOMMENDATIONS:   It is recommended that she:  Continue wearing her binaural hearing aids to improve speech understanding.  Continue to receive audiological monitoring annually.  Use precaution and/or hearing protection in noisy environments.    If you should have any questions or concerns regarding the above information, please do not hesitate to contact me at [phone].      _______________________________  Susannah Carter, WINDY-A  Audiologist

## 2019-02-27 ENCOUNTER — LAB VISIT (OUTPATIENT)
Dept: LAB | Facility: HOSPITAL | Age: 79
End: 2019-02-27
Attending: INTERNAL MEDICINE
Payer: MEDICARE

## 2019-02-27 DIAGNOSIS — N06.9 ISOLATED PROTEINURIA WITH MORPHOLOGIC LESION: ICD-10-CM

## 2019-02-27 DIAGNOSIS — Z79.4 TYPE 2 DIABETES MELLITUS WITH STAGE 3 CHRONIC KIDNEY DISEASE, WITH LONG-TERM CURRENT USE OF INSULIN: ICD-10-CM

## 2019-02-27 DIAGNOSIS — N18.30 TYPE 2 DIABETES MELLITUS WITH STAGE 3 CHRONIC KIDNEY DISEASE, WITH LONG-TERM CURRENT USE OF INSULIN: ICD-10-CM

## 2019-02-27 DIAGNOSIS — E11.22 TYPE 2 DIABETES MELLITUS WITH STAGE 3 CHRONIC KIDNEY DISEASE, WITH LONG-TERM CURRENT USE OF INSULIN: ICD-10-CM

## 2019-02-27 LAB
C3 SERPL-MCNC: 126 MG/DL
C4 SERPL-MCNC: 29 MG/DL

## 2019-02-27 PROCEDURE — 86255 FLUORESCENT ANTIBODY SCREEN: CPT | Mod: 91

## 2019-02-27 PROCEDURE — 86160 COMPLEMENT ANTIGEN: CPT

## 2019-02-27 PROCEDURE — 86160 COMPLEMENT ANTIGEN: CPT | Mod: 59

## 2019-02-27 PROCEDURE — 83520 IMMUNOASSAY QUANT NOS NONAB: CPT

## 2019-02-27 PROCEDURE — 86255 FLUORESCENT ANTIBODY SCREEN: CPT

## 2019-02-27 PROCEDURE — 86256 FLUORESCENT ANTIBODY TITER: CPT

## 2019-03-01 LAB — BM IGG SER-ACNC: <0.2 U

## 2019-03-04 ENCOUNTER — TELEPHONE (OUTPATIENT)
Dept: NEPHROLOGY | Facility: CLINIC | Age: 79
End: 2019-03-04

## 2019-03-04 LAB
ANCA AB TITR SER IF: ABNORMAL TITER
P-ANCA TITR SER IF: ABNORMAL TITER

## 2019-03-05 LAB
PHOSPHOLIPASE A2 RECEPTOR, ELISA: <2 RU/ML
PHOSPHOLIPASE A2 RECEPTOR, IFA: NEGATIVE

## 2019-03-06 ENCOUNTER — LAB VISIT (OUTPATIENT)
Dept: LAB | Facility: HOSPITAL | Age: 79
End: 2019-03-06
Attending: INTERNAL MEDICINE
Payer: MEDICARE

## 2019-03-06 DIAGNOSIS — E11.22 TYPE 2 DIABETES MELLITUS WITH STAGE 3 CHRONIC KIDNEY DISEASE, WITH LONG-TERM CURRENT USE OF INSULIN: ICD-10-CM

## 2019-03-06 DIAGNOSIS — N06.9 ISOLATED PROTEINURIA WITH MORPHOLOGIC LESION: ICD-10-CM

## 2019-03-06 DIAGNOSIS — Z79.4 TYPE 2 DIABETES MELLITUS WITH STAGE 3 CHRONIC KIDNEY DISEASE, WITH LONG-TERM CURRENT USE OF INSULIN: ICD-10-CM

## 2019-03-06 DIAGNOSIS — N18.30 TYPE 2 DIABETES MELLITUS WITH STAGE 3 CHRONIC KIDNEY DISEASE, WITH LONG-TERM CURRENT USE OF INSULIN: ICD-10-CM

## 2019-03-06 LAB
C3 SERPL-MCNC: 142 MG/DL
C4 SERPL-MCNC: 33 MG/DL

## 2019-03-06 PROCEDURE — 86255 FLUORESCENT ANTIBODY SCREEN: CPT | Mod: HCNC

## 2019-03-06 PROCEDURE — 86160 COMPLEMENT ANTIGEN: CPT | Mod: HCNC

## 2019-03-06 PROCEDURE — 86160 COMPLEMENT ANTIGEN: CPT | Mod: 59,HCNC

## 2019-03-07 ENCOUNTER — DOCUMENTATION ONLY (OUTPATIENT)
Dept: NEPHROLOGY | Facility: CLINIC | Age: 79
End: 2019-03-07

## 2019-03-08 NOTE — PROGRESS NOTES
At the patient's request I spoke with the patient's daughter Ms Betsy Cummins and explained the patients kidney issues and that she will likely require a biopsy for further diagnosis and treatment options. They will need to set up an appointment asap with another nephrologist as I am leaving the practice in the next few days to review her records and discuss her options for evaluation and treatment further.

## 2019-03-11 LAB
ANCA AB TITR SER IF: ABNORMAL TITER
P-ANCA TITR SER IF: ABNORMAL TITER

## 2019-03-12 ENCOUNTER — TELEPHONE (OUTPATIENT)
Dept: HEMATOLOGY/ONCOLOGY | Facility: CLINIC | Age: 79
End: 2019-03-12

## 2019-03-12 ENCOUNTER — OFFICE VISIT (OUTPATIENT)
Dept: INTERNAL MEDICINE | Facility: CLINIC | Age: 79
End: 2019-03-12
Payer: MEDICARE

## 2019-03-12 VITALS
HEIGHT: 63 IN | OXYGEN SATURATION: 95 % | SYSTOLIC BLOOD PRESSURE: 132 MMHG | HEART RATE: 76 BPM | BODY MASS INDEX: 27.97 KG/M2 | DIASTOLIC BLOOD PRESSURE: 58 MMHG | WEIGHT: 157.88 LBS

## 2019-03-12 DIAGNOSIS — R80.9 PROTEINURIA, UNSPECIFIED TYPE: Primary | ICD-10-CM

## 2019-03-12 DIAGNOSIS — N18.30 CHRONIC KIDNEY DISEASE, STAGE 3, MOD DECREASED GFR: ICD-10-CM

## 2019-03-12 DIAGNOSIS — J45.20 MILD INTERMITTENT CHRONIC ASTHMA WITHOUT COMPLICATION: ICD-10-CM

## 2019-03-12 DIAGNOSIS — R05.9 COUGH: ICD-10-CM

## 2019-03-12 PROCEDURE — 99499 UNLISTED E&M SERVICE: CPT | Mod: HCNC,S$GLB,, | Performed by: INTERNAL MEDICINE

## 2019-03-12 PROCEDURE — 99999 PR PBB SHADOW E&M-EST. PATIENT-LVL III: CPT | Mod: PBBFAC,HCNC,, | Performed by: INTERNAL MEDICINE

## 2019-03-12 PROCEDURE — 99214 PR OFFICE/OUTPT VISIT, EST, LEVL IV, 30-39 MIN: ICD-10-PCS | Mod: HCNC,S$GLB,, | Performed by: INTERNAL MEDICINE

## 2019-03-12 PROCEDURE — 99999 PR PBB SHADOW E&M-EST. PATIENT-LVL III: ICD-10-PCS | Mod: PBBFAC,HCNC,, | Performed by: INTERNAL MEDICINE

## 2019-03-12 PROCEDURE — 99499 RISK ADDL DX/OHS AUDIT: ICD-10-PCS | Mod: HCNC,S$GLB,, | Performed by: INTERNAL MEDICINE

## 2019-03-12 PROCEDURE — 3078F PR MOST RECENT DIASTOLIC BLOOD PRESSURE < 80 MM HG: ICD-10-PCS | Mod: HCNC,CPTII,S$GLB, | Performed by: INTERNAL MEDICINE

## 2019-03-12 PROCEDURE — 3075F SYST BP GE 130 - 139MM HG: CPT | Mod: HCNC,CPTII,S$GLB, | Performed by: INTERNAL MEDICINE

## 2019-03-12 PROCEDURE — 1101F PT FALLS ASSESS-DOCD LE1/YR: CPT | Mod: HCNC,CPTII,S$GLB, | Performed by: INTERNAL MEDICINE

## 2019-03-12 PROCEDURE — 3075F PR MOST RECENT SYSTOLIC BLOOD PRESS GE 130-139MM HG: ICD-10-PCS | Mod: HCNC,CPTII,S$GLB, | Performed by: INTERNAL MEDICINE

## 2019-03-12 PROCEDURE — 3078F DIAST BP <80 MM HG: CPT | Mod: HCNC,CPTII,S$GLB, | Performed by: INTERNAL MEDICINE

## 2019-03-12 PROCEDURE — 1101F PR PT FALLS ASSESS DOC 0-1 FALLS W/OUT INJ PAST YR: ICD-10-PCS | Mod: HCNC,CPTII,S$GLB, | Performed by: INTERNAL MEDICINE

## 2019-03-12 PROCEDURE — 99214 OFFICE O/P EST MOD 30 MIN: CPT | Mod: HCNC,S$GLB,, | Performed by: INTERNAL MEDICINE

## 2019-03-12 RX ORDER — BENZONATATE 100 MG/1
200 CAPSULE ORAL 3 TIMES DAILY PRN
Qty: 90 CAPSULE | Refills: 3 | Status: SHIPPED | OUTPATIENT
Start: 2019-03-12 | End: 2020-07-23 | Stop reason: SDUPTHER

## 2019-03-12 NOTE — PROGRESS NOTES
Subjective:      Patient ID: Myesha Quinones is a 78 y.o. female.    Chief Complaint: Follow-up    HPI:  HPI     Per Dr. Gardner  MPO EIA anca positive at 63 ( nl < 20)  no systemic symptoms of vasculiitis  +longstanding asthma  Proteinuria and renal function with subtle increases over the last 18-24 months  that most likely are related to DM nephropathy      repeat anca, anti-gbm, c3, c4 reviewed     Risks benefits of renal biopsy discussed with patient, awaiting her reply    Patient is concerned about how her health care will proceed now that her nephrologist has left.  Patient Active Problem List   Diagnosis    History of nonmelanoma skin cancer    Nonischemic cardiomyopathy    LBBB (left bundle branch block)    Chronic systolic heart failure    Nontoxic multinodular goiter    Meningioma    Asthma, chronic    Biventricular ICD (implantable cardioverter-defibrillator) in place    Tremor    Coronary artery disease involving native coronary artery without angina pectoris - Non-obstructive 50% LAD    Essential hypertension    Hyperlipidemia    History of breast cancer    Adrenal cortical nodule: repeat CT 6/2016    Calcification of aorta    Diabetic polyneuropathy associated with type 2 diabetes mellitus    Osteoporosis    KHOA (obstructive sleep apnea)    Type 2 diabetes mellitus with stage 3 chronic kidney disease, with long-term current use of insulin    Chronic kidney disease, stage 3, mod decreased GFR    Iron deficiency anemia    Chemotherapy-induced neuropathy    Hypomagnesemia    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, with long-term current use of insulin    Hypertensive retinopathy, bilateral    Multiple lung nodules    COPD (chronic obstructive pulmonary disease)    Mixed conductive and sensorineural hearing loss    ICD (implantable cardioverter-defibrillator) battery depletion    Type 2 DM with  CKD stage 3 and hypertension    Cardiomyopathy, ischemic    Metabolic bone disease    Proteinuria     Past Medical History:   Diagnosis Date    Allergy     Asthma     Basal cell carcinoma     left forehead    Basal cell carcinoma     left nose    Basal cell carcinoma 05/20/2015    right nose    Basal cell carcinoma 12/22/2015    left lower post neck    Breast cancer     CAD (coronary artery disease)     Cardiomyopathy     Cardiomyopathy, ischemic     Cataract     CHF (congestive heart failure)     Chronic kidney disease, stage 3, mod decreased GFR 2/14/2017    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, with long-term current use of insulin 2/22/2018    COPD (chronic obstructive pulmonary disease)     COPD exacerbation 4/8/2018    Defibrillator discharge     Diabetes mellitus     Diabetes mellitus type II     Diabetes with neurologic complications     Goiter     MNG    HX: breast cancer     Hyperlipidemia     Hypertension     Iron deficiency anemia 5/16/2017    Left kidney mass     Meningioma     Osteoporosis, postmenopausal     Pacemaker     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    Skin cancer     s/p excision    Sleep apnea     CPAP    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/2015    mid forehead    Unspecified vitamin D deficiency     Ventricular tachycardia     Vitamin B12 deficiency     Vitamin D deficiency disease      Past Surgical History:   Procedure Laterality Date    BASAL CELL CARCINOMA EXCISION      posterior neck and nose    BREAST BIOPSY      BREAST CYST EXCISION Left     BREAST SURGERY      CARDIAC DEFIBRILLATOR PLACEMENT      x 2    CATARACT EXTRACTION W/  INTRAOCULAR LENS IMPLANT Bilateral     CHOLECYSTECTOMY      Colonoscopy  N/A 8/27/2014    Performed by Leonidas Velasquez MD at 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    FRACTURE SURGERY      left elbow and  "wrist as a child    HYSTERECTOMY      MASTECTOMY Right     REPLACEMENT, ICD GENERATOR N/A 12/17/2018    Performed by Jan Mckeon MD at [facility] EP [facility]    Revision, Skin Pocket, For Cardioverter-Defibrillator  12/17/2018    Performed by Jan Mckeon MD at [facility] EP [facility]    SQUAMOUS CELL CARCINOMA EXCISION      remved from forehead    TONSILLECTOMY       Family History   Problem Relation Age of Onset    Diabetes Father     Heart disease Father     Diabetes Sister     Heart disease Sister     Diabetes Brother     Heart disease Brother     Hypertension Brother     Diabetes Brother     Heart disease Brother     Hypertension Brother     Diabetes Brother     Heart disease Brother     Cancer Brother         colon    Diabetes Brother     Cancer Son         skin    Diabetes Son         prediabetes    Diabetes Daughter         prediabetes    Cancer Daughter         melanoma    Obesity Daughter     Melanoma Daughter     Diabetes Son     Asthma Mother     Hypertension Mother     Stroke Mother     No Known Problems Maternal Grandmother     No Known Problems Maternal Grandfather     No Known Problems Paternal Grandmother     No Known Problems Paternal Grandfather     Amblyopia Neg Hx     Blindness Neg Hx     Cataracts Neg Hx     Glaucoma Neg Hx     Macular degeneration Neg Hx     Retinal detachment Neg Hx     Strabismus Neg Hx     Thyroid disease Neg Hx      Review of Systems   Constitutional: Negative for chills, fever and unexpected weight change.   HENT: Negative for trouble swallowing.    Respiratory: Negative for cough, shortness of breath and wheezing.    Cardiovascular: Negative for chest pain and palpitations.   Gastrointestinal: Negative for abdominal distention, abdominal pain, blood in stool and vomiting.   Musculoskeletal: Negative for back pain.     Objective:     Vitals:    03/12/19 0720   BP: (!) 132/58   Pulse: 76   SpO2: 95%   Weight: 71.6 kg (157 lb 13.6 oz)   Height: 5' 3" " (1.6 m)   PainSc: 0-No pain     Body mass index is 27.96 kg/m².  Physical Exam   Constitutional: She is oriented to person, place, and time. She appears well-developed and well-nourished. No distress.   Neck: Carotid bruit is not present. No thyromegaly present.   Cardiovascular: Normal rate, regular rhythm and normal heart sounds. PMI is not displaced.   Pulmonary/Chest: Effort normal and breath sounds normal. No respiratory distress.   Abdominal: Soft. Bowel sounds are normal. She exhibits no distension. There is no tenderness.   Musculoskeletal: She exhibits no edema.   Neurological: She is alert and oriented to person, place, and time.     Assessment:     1. Proteinuria, unspecified type    2. Chronic kidney disease, stage 3, mod decreased GFR    3. Mild intermittent chronic asthma without complication    4. Cough      Plan:   Myesha was seen today for follow-up.    Diagnoses and all orders for this visit:    Proteinuria, unspecified type  Comments:  Recent increase over the last year being evaluated in Nephrology    Chronic kidney disease, stage 3, mod decreased GFR  Comments:  Stable    Mild intermittent chronic asthma without complication  Comments:  Stable on current regimen  Orders:  -     benzonatate (TESSALON PERLES) 100 MG capsule; Take 2 capsules (200 mg total) by mouth 3 (three) times daily as needed for Cough.    Cough  Comments:  Previous cough resolved only that associated with her asthma present  Orders:  -     benzonatate (TESSALON PERLES) 100 MG capsule; Take 2 capsules (200 mg total) by mouth 3 (three) times daily as needed for Cough.        Problem List Items Addressed This Visit     Asthma, chronic    Relevant Medications    benzonatate (TESSALON PERLES) 100 MG capsule    Chronic kidney disease, stage 3, mod decreased GFR    Overview     Reduced metformin to 50% of previous as patient has GFR 32 ( 30-45) and is currently on insulin         Proteinuria - Primary      Other Visit Diagnoses      Cough        Previous cough resolved only that associated with her asthma present    Relevant Medications    benzonatate (TESSALON PERLES) 100 MG capsule        No orders of the defined types were placed in this encounter.    Follow-up in about 2 weeks (around 3/26/2019) for Follow up.     Medication List           Accurate as of 3/12/19  5:40 PM. If you have any questions, ask your nurse or doctor.               CHANGE how you take these medications    blood sugar diagnostic Strp  Commonly known as:  ACCU-CHEK HECTOR  Uses Accu-Check Hector meter to test BG 4x/day  What changed:  additional instructions     metFORMIN 500 MG tablet  Commonly known as:  GLUCOPHAGE  Take 2 tablets (1,000 mg total) by mouth 2 (two) times daily with meals.  What changed:    · how much to take  · additional instructions        CONTINUE taking these medications    ACCU-CHEK HECTOR CONTROL SOLN Soln  Generic drug:  blood glucose control high,low     ACCU-CHEK HECTOR PLUS METER Misc  Generic drug:  blood-glucose meter     albuterol 90 mcg/actuation inhaler  Commonly known as:  PROVENTIL/VENTOLIN HFA  Inhale 2 puffs into the lungs every 6 (six) hours as needed for Wheezing. Rescue     albuterol-ipratropium 2.5 mg-0.5 mg/3 mL nebulizer solution  Commonly known as:  DUO-NEB  TAKE 1 VIAL BY NEBULIZATION EVERY 4 (FOUR) HOURS. RESCUE     azelastine 137 mcg (0.1 %) nasal spray  Commonly known as:  ASTELIN  1 spray (137 mcg total) by Nasal route 2 (two) times daily. For severe allergy     B-12 DOTS ORAL     benzonatate 100 MG capsule  Commonly known as:  TESSALON PERLES  Take 2 capsules (200 mg total) by mouth 3 (three) times daily as needed for Cough.     carvedilol 25 MG tablet  Commonly known as:  COREG  TAKE 2 TABLETS (50 MG TOTAL) BY MOUTH 2 (TWO) TIMES DAILY.     chlorthalidone 25 MG Tab  Commonly known as:  HYGROTEN  Take 1 tablet (25 mg total) by mouth once daily.     cloNIDine 0.1 mg/24 hr td ptwk 0.1 mg/24 hr  Commonly known as:   "CATAPRES  Place 1 patch onto the skin every 7 days.     ENTERIC COATED ASPIRIN 81 MG EC tablet  Generic drug:  aspirin     FISH  mg Cap  Generic drug:  omega-3 fatty acids     fluticasone 50 mcg/actuation nasal spray  Commonly known as:  FLONASE  2 sprays (100 mcg total) by Each Nare route once daily.     fluticasone-salmeterol 250-50 mcg/dose 250-50 mcg/dose diskus inhaler  Commonly known as:  ADVAIR  Inhale 1 puff into the lungs 2 (two) times daily. This is a change from one month     hydrALAZINE 100 MG tablet  Commonly known as:  APRESOLINE  TAKE 1 TABLET (100 MG TOTAL) BY MOUTH 3 (THREE) TIMES DAILY.     insulin glargine 100 units/mL (3mL) SubQ pen  Commonly known as:  LANTUS SOLOSTAR U-100 INSULIN  Inject 30 Units into the skin every evening.     irbesartan 300 MG tablet  Commonly known as:  AVAPRO  Take 1 tablet (300 mg total) by mouth every evening.     * lancets Misc  Commonly known as:  ACCU-CHEK SOFTCLIX LANCETS  Uses Accu-Chek Angelica meter to test BG 4x/day     * lancets 30 gauge Misc     lancing device Misc     levocetirizine 5 MG tablet  Commonly known as:  XYZAL  Take 1 tablet (5 mg total) by mouth every evening.     linagliptin 5 mg Tab tablet  Commonly known as:  TRADJENTA  Take 1 tablet (5 mg total) by mouth once daily.     magnesium oxide 400 mg (241.3 mg magnesium) tablet  Commonly known as:  MAG-OX  Take 1 tablet (400 mg total) by mouth once daily.     nitroGLYCERIN 0.4 MG SL tablet  Commonly known as:  NITROSTAT     pen needle, diabetic 31 gauge x 3/16" Ndle  Commonly known as:  BD ULTRA-FINE MINI PEN NEEDLE  USE WITH LANTUS AT BEDTIME     simvastatin 20 MG tablet  Commonly known as:  ZOCOR  TAKE 1 TABLET (20 MG TOTAL) BY MOUTH EVERY EVENING.     VITAMIN D3 2,000 unit Tab  Generic drug:  cholecalciferol (vitamin D3)         * This list has 2 medication(s) that are the same as other medications prescribed for you. Read the directions carefully, and ask your doctor or other care provider to " review them with you.               Where to Get Your Medications      These medications were sent to [facility]/pharmacy #[id number] - [place] - [address] AT [facility]  [address] [address]    Phone:  [phone]   · benzonatate 100 MG capsule

## 2019-03-12 NOTE — TELEPHONE ENCOUNTER
Returned call scheduled recall for 4/9 and labs at internal [facility] on 4/3.  Mailed appt slip      ----- Message from Joann Blackman sent at 3/11/2019 12:46 PM CDT -----  Contact: pt   [facility]   Callback#[phone]  Thank You  ROCK Blackman

## 2019-03-13 ENCOUNTER — HOSPITAL ENCOUNTER (OUTPATIENT)
Dept: RADIOLOGY | Facility: HOSPITAL | Age: 79
Discharge: HOME | End: 2019-03-13
Attending: INTERNAL MEDICINE
Payer: MEDICARE

## 2019-03-13 DIAGNOSIS — Z85.3 HISTORY OF BREAST CANCER: ICD-10-CM

## 2019-03-13 PROCEDURE — 77061 MAMMO DIGITAL DIAGNOSTIC LEFT WITH TOMOSYNTHESIS_CAD: ICD-10-PCS | Mod: 26,HCNC,LT, | Performed by: RADIOLOGY

## 2019-03-13 PROCEDURE — 77065 DX MAMMO INCL CAD UNI: CPT | Mod: 26,HCNC,LT, | Performed by: RADIOLOGY

## 2019-03-13 PROCEDURE — 77065 MAMMO DIGITAL DIAGNOSTIC LEFT WITH TOMOSYNTHESIS_CAD: ICD-10-PCS | Mod: 26,HCNC,LT, | Performed by: RADIOLOGY

## 2019-03-13 PROCEDURE — 77061 BREAST TOMOSYNTHESIS UNI: CPT | Mod: 26,HCNC,LT, | Performed by: RADIOLOGY

## 2019-03-13 PROCEDURE — 77065 DX MAMMO INCL CAD UNI: CPT | Mod: TC,HCNC,PO,LT

## 2019-03-13 PROCEDURE — 77061 BREAST TOMOSYNTHESIS UNI: CPT | Mod: TC,HCNC,PO,LT

## 2019-03-14 ENCOUNTER — PATIENT OUTREACH (OUTPATIENT)
Dept: OTHER | Facility: OTHER | Age: 79
End: 2019-03-14

## 2019-03-14 ENCOUNTER — TELEPHONE (OUTPATIENT)
Dept: INTERNAL MEDICINE | Facility: CLINIC | Age: 79
End: 2019-03-14

## 2019-03-14 NOTE — PROGRESS NOTES
Last 5 Patient Entered Readings                                      Current 30 Day Average: 145/68     Recent Readings 3/13/2019 3/11/2019 3/8/2019 3/7/2019 3/4/2019    SBP (mmHg) 174 167 162 131 129    DBP (mmHg) 78 77 74 79 58    Pulse 72 72 70 78 72        Mrs. Quinones's BP has been higher recently. She states that she has been upset because Dr. Azul thinks she may have something wrong with her kidneys. eGFR has been stable. She is also in need of a new nephrologist as she says Dr. Azul is no longer at [facility]. She has been in contact with Dr. Gaston about everything. Will not change any of her medications at this time.     Per 30 day average, 145/68 mmHg, patient's BP is not at goal.     Will continue to monitor regularly. Will follow up in 2-3 weeks, sooner if BP begins to trend upward or downward.    Asked patient to call or message with questions or concerns.     Current HTN regimen:  Hypertension Medications             carvedilol (COREG) 25 MG tablet TAKE 2 TABLETS (50 MG TOTAL) BY MOUTH 2 (TWO) TIMES DAILY.    chlorthalidone (HYGROTEN) 25 MG Tab Take 1 tablet (25 mg total) by mouth once daily.    cloNIDine 0.1 mg/24 hr td ptwk (CATAPRES) 0.1 mg/24 hr Place 1 patch onto the skin every 7 days.    hydrALAZINE (APRESOLINE) 100 MG tablet TAKE 1 TABLET (100 MG TOTAL) BY MOUTH 3 (THREE) TIMES DAILY.    irbesartan (AVAPRO) 300 MG tablet Take 1 tablet (300 mg total) by mouth every evening.    nitroGLYCERIN (NITROSTAT) 0.4 MG SL tablet Place 1 tablet under the tongue continuous prn.

## 2019-03-14 NOTE — TELEPHONE ENCOUNTER
----- Message from Shalini Azul MD sent at 3/12/2019 11:45 AM CDT -----  Contact: Emelnia Gaston  Good morning Dr. Gaston,   I am concerned that she may have a smoldering vasculitis or pauci-immune GN  involving her kidneys since the rate of change of her renal function and proteinuria have accelerated somewhat in the last year.  Her p-anca is positive twice, other serologies are normal.  I sent an email to Dr. Chinedu Castillo and either Dr. Alvarado or he will followup with her. I did explain to them the urgency of evaluation.  I ut the daughter's phone number in one of my last notes. The patient and she would like  The daughter to be contacted with information/decisions.    Regards,  Shalini  ----- Message -----  From: Emelina Gaston MD  Sent: 3/12/2019   8:03 AM  To: MD Shalini Liao,      I am sorry to here you are leaving. Could you please leave me a brief thought on how critical her work up is from a time stand point. It appears that her renal function is stable but she has increase microalbumin in the urine and her work up is a concern for a vasculitis in addition to the microalbumin potentially being from diabetes.      Thank you,  Emelina

## 2019-03-18 DIAGNOSIS — Z95.810 AICD (AUTOMATIC CARDIOVERTER/DEFIBRILLATOR) PRESENT: Primary | ICD-10-CM

## 2019-03-20 NOTE — PROGRESS NOTES
Ms. Quinones is a patient of Dr. Mckeon and was last seen in clinic 7/18/2018.      Subjective:   Patient ID:  Myseha Quinones is a 78 y.o. female who presents for follow-up of Pacemaker Check  .     HPI:    Ms. Quinones is a 78 y.o. female with NICM, HTN, LBBB, CAD here for follow up after generator change.    Background:    CRT-D implanted 2007 >> EF has normalized.   Has done well for extended period.   12/17/12 had an episode of VT >> ATP unsuccessful, shock resulted in Type II break. She had presyncope >> then had shock.  We elected to defer addition of anti-arrhythmic therapy, as this was first shock, EF was normal.  Generator change 3/2013. Has felt well.    Update (03/21/2019):    Generator successfully replaced 12/17/2018.    Today she feels well. No cardiac complaints. Ms. Quinones denies chest pain with exertion or at rest, palpitations, SOB, PELAYO, dizziness, or syncope.    Device Interrogation (3/21/2019) reveals an intrinsic second degree AVB type II with stable lead and device function. No arrhythmias or treated episodes were noted.  She paces 1% in the RA and 100% in the BiV. Estimated battery longevity 7.7 years.     I have personally reviewed the patient's EKG today, which shows atrial sensed, biventricular paced at 76bpm. W[place] interval is 138. QRS is 130ms. QTc is 497.    Recent Cardiac Tests:    2D Echo (7/18/2016):  CONCLUSIONS     1 - Low normal to mildly depressed left ventricular systolic function (EF 50-55%).     2 - Left ventricular diastolic dysfunction.     3 - Severe left atrial enlargement.     4 - Normal right ventricular systolic function .     5 - Trivial to mild tricuspid regurgitation.     6 - Intermediate central venous pressure.     7 - Pulmonary hypertension. The estimated PA systolic pressure is 42 mmHg.     Current Outpatient Medications   Medication Sig    ACCU-CHEK HECTOR CONTROL SOLN Soln     ACCU-CHEK HECTOR PLUS METER Misc     albuterol 90 mcg/actuation inhaler Inhale 2  puffs into the lungs every 6 (six) hours as needed for Wheezing. Rescue    albuterol-ipratropium (DUO-NEB) 2.5 mg-0.5 mg/3 mL nebulizer solution TAKE 1 VIAL BY NEBULIZATION EVERY 4 (FOUR) HOURS. RESCUE    aspirin (ENTERIC COATED ASPIRIN) 81 MG EC tablet Take 1 tablet by mouth once daily.     benzonatate (TESSALON PERLES) 100 MG capsule Take 2 capsules (200 mg total) by mouth 3 (three) times daily as needed for Cough.    blood sugar diagnostic (ACCU-CHEK HECTOR) Strp Uses Accu-Check Hector meter to test BG 4x/day (Patient taking differently: Uses Accu-Check Hector meter to test BG 4x/day checking 2x day)    carvedilol (COREG) 25 MG tablet TAKE 2 TABLETS (50 MG TOTAL) BY MOUTH 2 (TWO) TIMES DAILY.    chlorthalidone (HYGROTEN) 25 MG Tab Take 1 tablet (25 mg total) by mouth once daily.    cholecalciferol, vitamin D3, (VITAMIN D3) 2,000 unit Tab Take by mouth once daily.    cloNIDine 0.1 mg/24 hr td ptwk (CATAPRES) 0.1 mg/24 hr Place 1 patch onto the skin every 7 days.    CYANOCOBALAMIN, VITAMIN B-12, (B-12 DOTS ORAL) Take 1 tablet by mouth once daily.    fluticasone (FLONASE) 50 mcg/actuation nasal spray 2 sprays (100 mcg total) by Each Nare route once daily.    fluticasone-salmeterol 250-50 mcg/dose (ADVAIR) 250-50 mcg/dose diskus inhaler Inhale 1 puff into the lungs 2 (two) times daily. This is a change from one month    hydrALAZINE (APRESOLINE) 100 MG tablet TAKE 1 TABLET (100 MG TOTAL) BY MOUTH 3 (THREE) TIMES DAILY.    insulin (LANTUS SOLOSTAR U-100 INSULIN) glargine 100 units/mL (3mL) SubQ pen Inject 30 Units into the skin every evening.    irbesartan (AVAPRO) 300 MG tablet Take 1 tablet (300 mg total) by mouth every evening.    lancets (ACCU-CHEK SOFTCLIX LANCETS) Misc Uses Accu-Chek Hector meter to test BG 4x/day    lancets 30 gauge Misc     lancing device Misc     levocetirizine (XYZAL) 5 MG tablet Take 1 tablet (5 mg total) by mouth every evening.    linagliptin (TRADJENTA) 5 mg Tab tablet Take  "1 tablet (5 mg total) by mouth once daily.    magnesium oxide (MAG-OX) 400 mg tablet Take 1 tablet (400 mg total) by mouth once daily.    metFORMIN (GLUCOPHAGE) 500 MG tablet Take 2 tablets (1,000 mg total) by mouth 2 (two) times daily with meals. (Patient taking differently: Take 500 mg by mouth 2 (two) times daily with meals. Reduced because of GFR)    nitroGLYCERIN (NITROSTAT) 0.4 MG SL tablet Place 1 tablet under the tongue continuous prn.      omega-3 fatty acids (FISH OIL) 500 mg Cap Take 1 capsule by mouth Twice daily.    pen needle, diabetic (BD INSULIN PEN NEEDLE UF MINI) 31 gauge x 3/16" Ndle USE WITH LANTUS AT BEDTIME    simvastatin (ZOCOR) 20 MG tablet TAKE 1 TABLET (20 MG TOTAL) BY MOUTH EVERY EVENING.    azelastine (ASTELIN) 137 mcg (0.1 %) nasal spray 1 spray (137 mcg total) by Nasal route 2 (two) times daily. For severe allergy     Current Facility-Administered Medications   Medication    acetaminophen tablet 650 mg     Facility-Administered Medications Ordered in Other Visits   Medication    0.9%  NaCl infusion       Review of Systems   Constitution: Negative for malaise/fatigue.   Cardiovascular: Negative for chest pain, dyspnea on exertion, irregular heartbeat, leg swelling and palpitations.   Respiratory: Negative for shortness of breath.    Hematologic/Lymphatic: Negative for bleeding problem.   Skin: Negative for rash.   Musculoskeletal: Negative for myalgias.   Gastrointestinal: Negative for hematemesis, hematochezia and nausea.   Genitourinary: Negative for hematuria.   Neurological: Negative for light-headedness.   Psychiatric/Behavioral: Negative for altered mental status.   Allergic/Immunologic: Negative for persistent infections.     Objective:        BP (!) 146/54   Pulse 76   Ht 5' 3" (1.6 m)   Wt 71.8 kg (158 lb 4.6 oz)   BMI 28.04 kg/m²     Physical Exam   Constitutional: She is oriented to person, place, and time. She appears well-developed and well-nourished.   HENT: "   Head: Normocephalic.   Nose: Nose normal.   Eyes: Pupils are equal, round, and reactive to light.   Cardiovascular: Normal rate, regular rhythm, S1 normal and S2 normal.   No murmur heard.  Pulses:       Radial pulses are 2+ on the right side, and 2+ on the left side.   Pulmonary/Chest: Breath sounds normal. No respiratory distress.   Device to LUCW.   Abdominal: Normal appearance.   Musculoskeletal: Normal range of motion. She exhibits no edema.   Neurological: She is alert and oriented to person, place, and time.   Skin: Skin is warm and dry. No erythema.   Psychiatric: She has a normal mood and affect. Her speech is normal and behavior is normal.   Nursing note and vitals reviewed.    Lab Results   Component Value Date     02/08/2019    K 3.8 02/08/2019    MG 1.8 04/08/2018    BUN 32 (H) 02/08/2019    CREATININE 1.4 02/08/2019    ALT 18 12/19/2018    AST 17 12/19/2018    HGB 11.9 (L) 02/08/2019    HCT 37.1 02/08/2019    TSH 1.127 12/19/2018    LDLCALC 43.4 (L) 12/19/2018       Recent Labs   Lab 04/08/18  0739 05/11/18  1025 12/10/18  0838   INR 1.0 1.0 1.0       Assessment:     1. Biventricular ICD (implantable cardioverter-defibrillator) in place    2. KHOA (obstructive sleep apnea)    3. Cardiomyopathy, ischemic    4. LBBB (left bundle branch block)    5. Essential hypertension    6. Nonischemic cardiomyopathy      Plan:     In summary, Ms. Quinones is a 78 y.o. female with NICM, HTN, LBBB, CAD here for follow up after generator change.  Ms. Quinones is doing well from a device perspective with stable lead and device function. No arrhythmia noted. 100% biventricular pacing and no CHF symptoms.  In digital HTN program. Also followed by Dr. Fernando in general cardiology.    Continue current medication regimen and device settings.   Follow up in device clinic as scheduled.   Follow up in EP clinic in 1 year, sooner as needed.     *A copy of this note has been sent to Dr. Mckeon*    Follow-up in about 1 year  (around 3/21/2020).    ------------------------------------------------------------------    TRICIA Elise, NP-C  [facility]

## 2019-03-21 ENCOUNTER — CLINICAL SUPPORT (OUTPATIENT)
Dept: CARDIOLOGY | Facility: HOSPITAL | Age: 79
End: 2019-03-21
Attending: INTERNAL MEDICINE
Payer: MEDICARE

## 2019-03-21 ENCOUNTER — HOSPITAL ENCOUNTER (OUTPATIENT)
Dept: CARDIOLOGY | Facility: CLINIC | Age: 79
Discharge: HOME | End: 2019-03-21
Payer: MEDICARE

## 2019-03-21 ENCOUNTER — OFFICE VISIT (OUTPATIENT)
Dept: ELECTROPHYSIOLOGY | Facility: CLINIC | Age: 79
End: 2019-03-21
Payer: MEDICARE

## 2019-03-21 VITALS
SYSTOLIC BLOOD PRESSURE: 146 MMHG | HEIGHT: 63 IN | WEIGHT: 158.31 LBS | BODY MASS INDEX: 28.05 KG/M2 | DIASTOLIC BLOOD PRESSURE: 54 MMHG | HEART RATE: 76 BPM

## 2019-03-21 DIAGNOSIS — I25.5 CARDIOMYOPATHY, ISCHEMIC: ICD-10-CM

## 2019-03-21 DIAGNOSIS — I44.7 LBBB (LEFT BUNDLE BRANCH BLOCK): ICD-10-CM

## 2019-03-21 DIAGNOSIS — Z95.810 BIVENTRICULAR ICD (IMPLANTABLE CARDIOVERTER-DEFIBRILLATOR) IN PLACE: Primary | ICD-10-CM

## 2019-03-21 DIAGNOSIS — G47.33 OSA (OBSTRUCTIVE SLEEP APNEA): ICD-10-CM

## 2019-03-21 DIAGNOSIS — I42.8 NONISCHEMIC CARDIOMYOPATHY: ICD-10-CM

## 2019-03-21 DIAGNOSIS — Z95.810 ICD (IMPLANTABLE CARDIOVERTER-DEFIBRILLATOR) IN PLACE: ICD-10-CM

## 2019-03-21 DIAGNOSIS — I10 ESSENTIAL HYPERTENSION: ICD-10-CM

## 2019-03-21 PROCEDURE — 99499 UNLISTED E&M SERVICE: CPT | Mod: HCNC,S$GLB,, | Performed by: NURSE PRACTITIONER

## 2019-03-21 PROCEDURE — 3077F SYST BP >= 140 MM HG: CPT | Mod: HCNC,CPTII,S$GLB, | Performed by: NURSE PRACTITIONER

## 2019-03-21 PROCEDURE — 93284 PRGRMG EVAL IMPLANTABLE DFB: CPT | Mod: HCNC

## 2019-03-21 PROCEDURE — 93000 RHYTHM STRIP: ICD-10-PCS | Mod: HCNC,S$GLB,, | Performed by: INTERNAL MEDICINE

## 2019-03-21 PROCEDURE — 99214 PR OFFICE/OUTPT VISIT, EST, LEVL IV, 30-39 MIN: ICD-10-PCS | Mod: HCNC,S$GLB,, | Performed by: NURSE PRACTITIONER

## 2019-03-21 PROCEDURE — 99999 PR PBB SHADOW E&M-EST. PATIENT-LVL III: CPT | Mod: PBBFAC,HCNC,, | Performed by: NURSE PRACTITIONER

## 2019-03-21 PROCEDURE — 93000 ELECTROCARDIOGRAM COMPLETE: CPT | Mod: HCNC,S$GLB,, | Performed by: INTERNAL MEDICINE

## 2019-03-21 PROCEDURE — 99214 OFFICE O/P EST MOD 30 MIN: CPT | Mod: HCNC,S$GLB,, | Performed by: NURSE PRACTITIONER

## 2019-03-21 PROCEDURE — 3077F PR MOST RECENT SYSTOLIC BLOOD PRESSURE >= 140 MM HG: ICD-10-PCS | Mod: HCNC,CPTII,S$GLB, | Performed by: NURSE PRACTITIONER

## 2019-03-21 PROCEDURE — 1101F PT FALLS ASSESS-DOCD LE1/YR: CPT | Mod: HCNC,CPTII,S$GLB, | Performed by: NURSE PRACTITIONER

## 2019-03-21 PROCEDURE — 99499 RISK ADDL DX/OHS AUDIT: ICD-10-PCS | Mod: HCNC,S$GLB,, | Performed by: NURSE PRACTITIONER

## 2019-03-21 PROCEDURE — 1101F PR PT FALLS ASSESS DOC 0-1 FALLS W/OUT INJ PAST YR: ICD-10-PCS | Mod: HCNC,CPTII,S$GLB, | Performed by: NURSE PRACTITIONER

## 2019-03-21 PROCEDURE — 99999 PR PBB SHADOW E&M-EST. PATIENT-LVL III: ICD-10-PCS | Mod: PBBFAC,HCNC,, | Performed by: NURSE PRACTITIONER

## 2019-03-21 PROCEDURE — 3078F DIAST BP <80 MM HG: CPT | Mod: HCNC,CPTII,S$GLB, | Performed by: NURSE PRACTITIONER

## 2019-03-21 PROCEDURE — 3078F PR MOST RECENT DIASTOLIC BLOOD PRESSURE < 80 MM HG: ICD-10-PCS | Mod: HCNC,CPTII,S$GLB, | Performed by: NURSE PRACTITIONER

## 2019-03-22 ENCOUNTER — TELEPHONE (OUTPATIENT)
Dept: NEPHROLOGY | Facility: CLINIC | Age: 79
End: 2019-03-22

## 2019-03-26 ENCOUNTER — TELEPHONE (OUTPATIENT)
Dept: INTERNAL MEDICINE | Facility: CLINIC | Age: 79
End: 2019-03-26

## 2019-03-26 ENCOUNTER — LAB VISIT (OUTPATIENT)
Dept: LAB | Facility: HOSPITAL | Age: 79
End: 2019-03-26
Attending: INTERNAL MEDICINE
Payer: MEDICARE

## 2019-03-26 ENCOUNTER — OFFICE VISIT (OUTPATIENT)
Dept: INTERNAL MEDICINE | Facility: CLINIC | Age: 79
End: 2019-03-26
Payer: MEDICARE

## 2019-03-26 VITALS
HEART RATE: 76 BPM | DIASTOLIC BLOOD PRESSURE: 60 MMHG | WEIGHT: 159.38 LBS | SYSTOLIC BLOOD PRESSURE: 120 MMHG | HEIGHT: 63 IN | OXYGEN SATURATION: 95 % | BODY MASS INDEX: 28.24 KG/M2

## 2019-03-26 DIAGNOSIS — Z79.4 TYPE 2 DIABETES MELLITUS WITH STAGE 3 CHRONIC KIDNEY DISEASE, WITH LONG-TERM CURRENT USE OF INSULIN: Primary | ICD-10-CM

## 2019-03-26 DIAGNOSIS — N18.30 CHRONIC KIDNEY DISEASE, STAGE III (MODERATE): ICD-10-CM

## 2019-03-26 DIAGNOSIS — E11.22 TYPE 2 DIABETES MELLITUS WITH STAGE 3 CHRONIC KIDNEY DISEASE, WITH LONG-TERM CURRENT USE OF INSULIN: Primary | ICD-10-CM

## 2019-03-26 DIAGNOSIS — N18.30 CHRONIC KIDNEY DISEASE, STAGE III (MODERATE): Primary | ICD-10-CM

## 2019-03-26 DIAGNOSIS — N18.30 TYPE 2 DIABETES MELLITUS WITH STAGE 3 CHRONIC KIDNEY DISEASE, WITH LONG-TERM CURRENT USE OF INSULIN: Primary | ICD-10-CM

## 2019-03-26 LAB
25(OH)D3+25(OH)D2 SERPL-MCNC: 35 NG/ML (ref 30–96)
ALBUMIN SERPL BCP-MCNC: 3.4 G/DL (ref 3.5–5.2)
ALBUMIN SERPL BCP-MCNC: 3.4 G/DL (ref 3.5–5.2)
ALP SERPL-CCNC: 81 U/L (ref 55–135)
ALT SERPL W/O P-5'-P-CCNC: 19 U/L (ref 10–44)
ANION GAP SERPL CALC-SCNC: 10 MMOL/L (ref 8–16)
ANION GAP SERPL CALC-SCNC: 10 MMOL/L (ref 8–16)
AST SERPL-CCNC: 17 U/L (ref 10–40)
BASOPHILS # BLD AUTO: 0.05 K/UL (ref 0–0.2)
BASOPHILS NFR BLD: 0.5 % (ref 0–1.9)
BILIRUB SERPL-MCNC: 0.3 MG/DL (ref 0.1–1)
BUN SERPL-MCNC: 34 MG/DL (ref 8–23)
BUN SERPL-MCNC: 34 MG/DL (ref 8–23)
CALCIUM SERPL-MCNC: 9.7 MG/DL (ref 8.7–10.5)
CALCIUM SERPL-MCNC: 9.7 MG/DL (ref 8.7–10.5)
CHLORIDE SERPL-SCNC: 100 MMOL/L (ref 95–110)
CHLORIDE SERPL-SCNC: 100 MMOL/L (ref 95–110)
CO2 SERPL-SCNC: 27 MMOL/L (ref 23–29)
CO2 SERPL-SCNC: 27 MMOL/L (ref 23–29)
CREAT SERPL-MCNC: 1.4 MG/DL (ref 0.5–1.4)
CREAT SERPL-MCNC: 1.4 MG/DL (ref 0.5–1.4)
DIFFERENTIAL METHOD: ABNORMAL
EOSINOPHIL # BLD AUTO: 0.6 K/UL (ref 0–0.5)
EOSINOPHIL NFR BLD: 5.9 % (ref 0–8)
ERYTHROCYTE [DISTWIDTH] IN BLOOD BY AUTOMATED COUNT: 12.4 % (ref 11.5–14.5)
EST. GFR  (AFRICAN AMERICAN): 41.2 ML/MIN/1.73 M^2
EST. GFR  (AFRICAN AMERICAN): 41.2 ML/MIN/1.73 M^2
EST. GFR  (NON AFRICAN AMERICAN): 35.8 ML/MIN/1.73 M^2
EST. GFR  (NON AFRICAN AMERICAN): 35.8 ML/MIN/1.73 M^2
GLUCOSE SERPL-MCNC: 249 MG/DL (ref 70–110)
GLUCOSE SERPL-MCNC: 249 MG/DL (ref 70–110)
HCT VFR BLD AUTO: 33.7 % (ref 37–48.5)
HGB BLD-MCNC: 10.7 G/DL (ref 12–16)
IMM GRANULOCYTES # BLD AUTO: 0.06 K/UL (ref 0–0.04)
IMM GRANULOCYTES NFR BLD AUTO: 0.6 % (ref 0–0.5)
LYMPHOCYTES # BLD AUTO: 1.7 K/UL (ref 1–4.8)
LYMPHOCYTES NFR BLD: 18.4 % (ref 18–48)
MCH RBC QN AUTO: 29.4 PG (ref 27–31)
MCHC RBC AUTO-ENTMCNC: 31.8 G/DL (ref 32–36)
MCV RBC AUTO: 93 FL (ref 82–98)
MONOCYTES # BLD AUTO: 0.7 K/UL (ref 0.3–1)
MONOCYTES NFR BLD: 7.6 % (ref 4–15)
NEUTROPHILS # BLD AUTO: 6.3 K/UL (ref 1.8–7.7)
NEUTROPHILS NFR BLD: 67 % (ref 38–73)
NRBC BLD-RTO: 0 /100 WBC
PHOSPHATE SERPL-MCNC: 3.5 MG/DL (ref 2.7–4.5)
PLATELET # BLD AUTO: 199 K/UL (ref 150–350)
PMV BLD AUTO: 12.8 FL (ref 9.2–12.9)
POTASSIUM SERPL-SCNC: 4.6 MMOL/L (ref 3.5–5.1)
POTASSIUM SERPL-SCNC: 4.6 MMOL/L (ref 3.5–5.1)
PROT SERPL-MCNC: 6.7 G/DL (ref 6–8.4)
PTH-INTACT SERPL-MCNC: 72 PG/ML (ref 9–77)
RBC # BLD AUTO: 3.64 M/UL (ref 4–5.4)
SODIUM SERPL-SCNC: 137 MMOL/L (ref 136–145)
SODIUM SERPL-SCNC: 137 MMOL/L (ref 136–145)
URATE SERPL-MCNC: 7.2 MG/DL (ref 2.4–5.7)
WBC # BLD AUTO: 9.39 K/UL (ref 3.9–12.7)

## 2019-03-26 PROCEDURE — 1101F PR PT FALLS ASSESS DOC 0-1 FALLS W/OUT INJ PAST YR: ICD-10-PCS | Mod: HCNC,CPTII,S$GLB, | Performed by: INTERNAL MEDICINE

## 2019-03-26 PROCEDURE — 85025 COMPLETE CBC W/AUTO DIFF WBC: CPT | Mod: HCNC

## 2019-03-26 PROCEDURE — 36415 COLL VENOUS BLD VENIPUNCTURE: CPT | Mod: HCNC,PO

## 2019-03-26 PROCEDURE — 82306 VITAMIN D 25 HYDROXY: CPT | Mod: HCNC

## 2019-03-26 PROCEDURE — 84100 ASSAY OF PHOSPHORUS: CPT | Mod: HCNC

## 2019-03-26 PROCEDURE — 3074F PR MOST RECENT SYSTOLIC BLOOD PRESSURE < 130 MM HG: ICD-10-PCS | Mod: HCNC,CPTII,S$GLB, | Performed by: INTERNAL MEDICINE

## 2019-03-26 PROCEDURE — 99213 PR OFFICE/OUTPT VISIT, EST, LEVL III, 20-29 MIN: ICD-10-PCS | Mod: HCNC,S$GLB,, | Performed by: INTERNAL MEDICINE

## 2019-03-26 PROCEDURE — 3074F SYST BP LT 130 MM HG: CPT | Mod: HCNC,CPTII,S$GLB, | Performed by: INTERNAL MEDICINE

## 2019-03-26 PROCEDURE — 99999 PR PBB SHADOW E&M-EST. PATIENT-LVL V: ICD-10-PCS | Mod: PBBFAC,HCNC,, | Performed by: INTERNAL MEDICINE

## 2019-03-26 PROCEDURE — 1101F PT FALLS ASSESS-DOCD LE1/YR: CPT | Mod: HCNC,CPTII,S$GLB, | Performed by: INTERNAL MEDICINE

## 2019-03-26 PROCEDURE — 80053 COMPREHEN METABOLIC PANEL: CPT | Mod: HCNC

## 2019-03-26 PROCEDURE — 3078F DIAST BP <80 MM HG: CPT | Mod: HCNC,CPTII,S$GLB, | Performed by: INTERNAL MEDICINE

## 2019-03-26 PROCEDURE — 84550 ASSAY OF BLOOD/URIC ACID: CPT | Mod: HCNC

## 2019-03-26 PROCEDURE — 83970 ASSAY OF PARATHORMONE: CPT | Mod: HCNC

## 2019-03-26 PROCEDURE — 99213 OFFICE O/P EST LOW 20 MIN: CPT | Mod: HCNC,S$GLB,, | Performed by: INTERNAL MEDICINE

## 2019-03-26 PROCEDURE — 99499 RISK ADDL DX/OHS AUDIT: ICD-10-PCS | Mod: HCNC,S$GLB,, | Performed by: INTERNAL MEDICINE

## 2019-03-26 PROCEDURE — 3078F PR MOST RECENT DIASTOLIC BLOOD PRESSURE < 80 MM HG: ICD-10-PCS | Mod: HCNC,CPTII,S$GLB, | Performed by: INTERNAL MEDICINE

## 2019-03-26 PROCEDURE — 99999 PR PBB SHADOW E&M-EST. PATIENT-LVL V: CPT | Mod: PBBFAC,HCNC,, | Performed by: INTERNAL MEDICINE

## 2019-03-26 PROCEDURE — 99499 UNLISTED E&M SERVICE: CPT | Mod: HCNC,S$GLB,, | Performed by: INTERNAL MEDICINE

## 2019-03-26 NOTE — TELEPHONE ENCOUNTER
Which to see if she could come early in do a urinalysis and a protein creatinine ratio at the [facility] prior to her appointment with Dr. Castillo.

## 2019-03-26 NOTE — PROGRESS NOTES
Subjective:      Patient ID: Myesha Quinones is a 78 y.o. female.    Chief Complaint: Follow-up    HPI:  HPI   Patient was brought back to discuss a further Nephrology appointment.  In reviewing the notes I did see that the Nephrology Department call the patient.  I was able to find the message on her phone to show her.  We were unable to make the appointment today with the Nephrology appointment but did send them a message and they will be able to give her an appointment tomorrow provided she has her lab work done prior to the appointment.  Patient Active Problem List   Diagnosis    History of nonmelanoma skin cancer    Nonischemic cardiomyopathy    LBBB (left bundle branch block)    Chronic systolic heart failure    Nontoxic multinodular goiter    Meningioma    Asthma, chronic    Biventricular ICD (implantable cardioverter-defibrillator) in place    Tremor    Coronary artery disease involving native coronary artery without angina pectoris - Non-obstructive 50% LAD    Essential hypertension    Hyperlipidemia    History of breast cancer    Adrenal cortical nodule: repeat CT 6/2016    Calcification of aorta    Diabetic polyneuropathy associated with type 2 diabetes mellitus    Osteoporosis    KHOA (obstructive sleep apnea)    Type 2 diabetes mellitus with stage 3 chronic kidney disease, with long-term current use of insulin    Chronic kidney disease, stage 3, mod decreased GFR    Iron deficiency anemia    Chemotherapy-induced neuropathy    Hypomagnesemia    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, with long-term current use of insulin    Hypertensive retinopathy, bilateral    Multiple lung nodules    COPD (chronic obstructive pulmonary disease)    Mixed conductive and sensorineural hearing loss    ICD (implantable cardioverter-defibrillator) battery depletion    Type 2 DM with CKD stage 3 and hypertension     Cardiomyopathy, ischemic    Metabolic bone disease    Proteinuria     Past Medical History:   Diagnosis Date    Allergy     Asthma     Basal cell carcinoma     left forehead    Basal cell carcinoma     left nose    Basal cell carcinoma 05/20/2015    right nose    Basal cell carcinoma 12/22/2015    left lower post neck    Breast cancer     CAD (coronary artery disease)     Cardiomyopathy     Cardiomyopathy, ischemic     Cataract     CHF (congestive heart failure)     Chronic kidney disease, stage 3, mod decreased GFR 2/14/2017    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, with long-term current use of insulin 2/22/2018    COPD (chronic obstructive pulmonary disease)     COPD exacerbation 4/8/2018    Defibrillator discharge     Diabetes mellitus     Diabetes mellitus type II     Diabetes with neurologic complications     Goiter     MNG    HX: breast cancer     Hyperlipidemia     Hypertension     Iron deficiency anemia 5/16/2017    Left kidney mass     Meningioma     Osteoporosis, postmenopausal     Pacemaker     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    Skin cancer     s/p excision    Sleep apnea     CPAP    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/2015    mid forehead    Unspecified vitamin D deficiency     Ventricular tachycardia     Vitamin B12 deficiency     Vitamin D deficiency disease      Past Surgical History:   Procedure Laterality Date    BASAL CELL CARCINOMA EXCISION      posterior neck and nose    BREAST BIOPSY      BREAST CYST EXCISION Left     BREAST SURGERY      CARDIAC DEFIBRILLATOR PLACEMENT      x 2    CATARACT EXTRACTION W/  INTRAOCULAR LENS IMPLANT Bilateral     CHOLECYSTECTOMY      Colonoscopy  N/A 8/27/2014    Performed by Leonidas Velasquez MD at [facility] (4TH FLR)    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    FRACTURE SURGERY      left elbow and wrist as a child     "HYSTERECTOMY      MASTECTOMY Right     REPLACEMENT, ICD GENERATOR N/A 12/17/2018    Performed by Jan Mckeon MD at [facility]    Revision, Skin Pocket, For Cardioverter-Defibrillator  12/17/2018    Performed by Jan Mckeon MD at [facility]    SQUAMOUS CELL CARCINOMA EXCISION      remved from forehead    TONSILLECTOMY       Family History   Problem Relation Age of Onset    Diabetes Father     Heart disease Father     Diabetes Sister     Heart disease Sister     Diabetes Brother     Heart disease Brother     Hypertension Brother     Diabetes Brother     Heart disease Brother     Hypertension Brother     Diabetes Brother     Heart disease Brother     Cancer Brother         colon    Diabetes Brother     Cancer Son         skin    Diabetes Son         prediabetes    Diabetes Daughter         prediabetes    Cancer Daughter         melanoma    Obesity Daughter     Melanoma Daughter     Diabetes Son     Asthma Mother     Hypertension Mother     Stroke Mother     No Known Problems Maternal Grandmother     No Known Problems Maternal Grandfather     No Known Problems Paternal Grandmother     No Known Problems Paternal Grandfather     Amblyopia Neg Hx     Blindness Neg Hx     Cataracts Neg Hx     Glaucoma Neg Hx     Macular degeneration Neg Hx     Retinal detachment Neg Hx     Strabismus Neg Hx     Thyroid disease Neg Hx      Review of Systems  Objective:     Vitals:    03/26/19 0800   BP: 120/60   Pulse: 76   SpO2: 95%   Weight: 72.3 kg (159 lb 6.3 oz)   Height: 5' 3" (1.6 m)   PainSc: 0-No pain     Body mass index is 28.24 kg/m².  Physical Exam   Constitutional: She appears well-developed and well-nourished.   Neck: No JVD present. No thyromegaly present.   Cardiovascular: Normal rate, normal heart sounds and intact distal pulses.   Pulmonary/Chest: Effort normal and breath sounds normal. No respiratory distress.     Assessment:     1. Type 2 diabetes mellitus with stage 3 " chronic kidney disease, with long-term current use of insulin     Regarding the Nephrology appointment we were sent a message that she would be able to be seen tomorrow at 1:00 p.m. provided she had her labs done prior.  I scheduled her lab work to the [place] [facility] which she completed this afternoon and should be available for her 1:00 p.m. appointment tomorrow with Dr. Castillo.  Plan:   Myesha was seen today for follow-up.    Diagnoses and all orders for this visit:    Type 2 diabetes mellitus with stage 3 chronic kidney disease, with long-term current use of insulin        Problem List Items Addressed This Visit     Type 2 diabetes mellitus with stage 3 chronic kidney disease, with long-term current use of insulin - Primary        No orders of the defined types were placed in this encounter.    Follow up in about 3 months (around 6/11/2019) for Follow up.     Medication List           Accurate as of 3/26/19  8:32 AM. If you have any questions, ask your nurse or doctor.               CHANGE how you take these medications    blood sugar diagnostic Strp  Commonly known as:  ACCU-CHEK HECTOR  Uses Accu-Check Hector meter to test BG 4x/day  What changed:  additional instructions     metFORMIN 500 MG tablet  Commonly known as:  GLUCOPHAGE  Take 2 tablets (1,000 mg total) by mouth 2 (two) times daily with meals.  What changed:    · how much to take  · additional instructions        CONTINUE taking these medications    ACCU-CHEK HECTOR CONTROL SOLN Soln  Generic drug:  blood glucose control high,low     ACCU-CHEK HECTOR PLUS METER Misc  Generic drug:  blood-glucose meter     albuterol 90 mcg/actuation inhaler  Commonly known as:  PROVENTIL/VENTOLIN HFA  Inhale 2 puffs into the lungs every 6 (six) hours as needed for Wheezing. Rescue     albuterol-ipratropium 2.5 mg-0.5 mg/3 mL nebulizer solution  Commonly known as:  DUO-NEB  TAKE 1 VIAL BY NEBULIZATION EVERY 4 (FOUR) HOURS. RESCUE     azelastine 137 mcg (0.1 %) nasal  spray  Commonly known as:  ASTELIN  1 spray (137 mcg total) by Nasal route 2 (two) times daily. For severe allergy     B-12 DOTS ORAL     benzonatate 100 MG capsule  Commonly known as:  TESSALON PERLES  Take 2 capsules (200 mg total) by mouth 3 (three) times daily as needed for Cough.     carvedilol 25 MG tablet  Commonly known as:  COREG  TAKE 2 TABLETS (50 MG TOTAL) BY MOUTH 2 (TWO) TIMES DAILY.     chlorthalidone 25 MG Tab  Commonly known as:  HYGROTEN  Take 1 tablet (25 mg total) by mouth once daily.     cloNIDine 0.1 mg/24 hr td ptwk 0.1 mg/24 hr  Commonly known as:  CATAPRES  Place 1 patch onto the skin every 7 days.     ENTERIC COATED ASPIRIN 81 MG EC tablet  Generic drug:  aspirin     FISH  mg Cap  Generic drug:  omega-3 fatty acids     fluticasone 50 mcg/actuation nasal spray  Commonly known as:  FLONASE  2 sprays (100 mcg total) by Each Nare route once daily.     fluticasone-salmeterol 250-50 mcg/dose 250-50 mcg/dose diskus inhaler  Commonly known as:  ADVAIR  Inhale 1 puff into the lungs 2 (two) times daily. This is a change from one month     hydrALAZINE 100 MG tablet  Commonly known as:  APRESOLINE  TAKE 1 TABLET (100 MG TOTAL) BY MOUTH 3 (THREE) TIMES DAILY.     insulin glargine 100 units/mL (3mL) SubQ pen  Commonly known as:  LANTUS SOLOSTAR U-100 INSULIN  Inject 30 Units into the skin every evening.     irbesartan 300 MG tablet  Commonly known as:  AVAPRO  Take 1 tablet (300 mg total) by mouth every evening.     * lancets Misc  Commonly known as:  ACCU-CHEK SOFTCLIX LANCETS  Uses Accu-Chek Angelica meter to test BG 4x/day     * lancets 30 gauge Misc     lancing device Misc     levocetirizine 5 MG tablet  Commonly known as:  XYZAL  Take 1 tablet (5 mg total) by mouth every evening.     linagliptin 5 mg Tab tablet  Commonly known as:  TRADJENTA  Take 1 tablet (5 mg total) by mouth once daily.     magnesium oxide 400 mg (241.3 mg magnesium) tablet  Commonly known as:  MAG-OX  Take 1 tablet (400 mg  "total) by mouth once daily.     nitroGLYCERIN 0.4 MG SL tablet  Commonly known as:  NITROSTAT     pen needle, diabetic 31 gauge x 3/16" Ndle  Commonly known as:  BD ULTRA-FINE MINI PEN NEEDLE  USE WITH LANTUS AT BEDTIME     simvastatin 20 MG tablet  Commonly known as:  ZOCOR  TAKE 1 TABLET (20 MG TOTAL) BY MOUTH EVERY EVENING.     VITAMIN D3 2,000 unit Tab  Generic drug:  cholecalciferol (vitamin D3)         * This list has 2 medication(s) that are the same as other medications prescribed for you. Read the directions carefully, and ask your doctor or other care provider to review them with you.                "

## 2019-03-26 NOTE — TELEPHONE ENCOUNTER
----- Message from Giovany Sims MA sent at 3/26/2019  2:13 PM CDT -----  Contact: Dr. Gaston / [phone]  Pt can come in tomorrow for 1pm pt must have all lab work completed.  ----- Message -----  From: Emelina Gaston MD  Sent: 3/26/2019   1:12 PM  To: Giovany Sims MA    Just let me know and I will make the appt happen. Dr. Gaston  ----- Message -----  From: Giovany Sims MA  Sent: 3/26/2019   9:33 AM  To: Emelina Gaston MD, Chinedu Sorensen MD    Hi I do see where Dr. sorensen wanted to see her I tried call ing this pt left a vm that was  A time slot just for her that was going to be made available  On 3/25 but pt had to call us back to verify I will check back with Dr. Sorensen for a further appt and labs.  ----- Message -----  From: Virginia Lafleur  Sent: 3/26/2019   8:19 AM  To: Anna BELL Staff    Dr. Gaston called to schedule a fu appointment for pt. PT can be reached at [phone].

## 2019-03-27 ENCOUNTER — TELEPHONE (OUTPATIENT)
Dept: ENDOCRINOLOGY | Facility: HOSPITAL | Age: 79
End: 2019-03-27

## 2019-03-27 ENCOUNTER — LAB VISIT (OUTPATIENT)
Dept: LAB | Facility: HOSPITAL | Age: 79
End: 2019-03-27
Attending: INTERNAL MEDICINE
Payer: MEDICARE

## 2019-03-27 ENCOUNTER — TELEPHONE (OUTPATIENT)
Dept: ENDOCRINOLOGY | Facility: CLINIC | Age: 79
End: 2019-03-27

## 2019-03-27 ENCOUNTER — OFFICE VISIT (OUTPATIENT)
Dept: NEPHROLOGY | Facility: CLINIC | Age: 79
End: 2019-03-27
Payer: MEDICARE

## 2019-03-27 VITALS
WEIGHT: 158.75 LBS | OXYGEN SATURATION: 96 % | HEART RATE: 92 BPM | SYSTOLIC BLOOD PRESSURE: 180 MMHG | BODY MASS INDEX: 28.13 KG/M2 | DIASTOLIC BLOOD PRESSURE: 80 MMHG | HEIGHT: 63 IN

## 2019-03-27 DIAGNOSIS — N18.30 TYPE 2 DM WITH CKD STAGE 3 AND HYPERTENSION: Primary | ICD-10-CM

## 2019-03-27 DIAGNOSIS — I10 ESSENTIAL HYPERTENSION: ICD-10-CM

## 2019-03-27 DIAGNOSIS — I12.9 TYPE 2 DM WITH CKD STAGE 3 AND HYPERTENSION: Primary | ICD-10-CM

## 2019-03-27 DIAGNOSIS — N18.30 CHRONIC KIDNEY DISEASE, STAGE III (MODERATE): ICD-10-CM

## 2019-03-27 DIAGNOSIS — E11.22 TYPE 2 DM WITH CKD STAGE 3 AND HYPERTENSION: Primary | ICD-10-CM

## 2019-03-27 DIAGNOSIS — N18.30 TYPE 2 DIABETES MELLITUS WITH STAGE 3 CHRONIC KIDNEY DISEASE, WITH LONG-TERM CURRENT USE OF INSULIN: Primary | ICD-10-CM

## 2019-03-27 DIAGNOSIS — I12.9 TYPE 2 DM WITH CKD STAGE 3 AND HYPERTENSION: ICD-10-CM

## 2019-03-27 DIAGNOSIS — R80.9 PROTEINURIA, UNSPECIFIED TYPE: ICD-10-CM

## 2019-03-27 DIAGNOSIS — E11.22 TYPE 2 DIABETES MELLITUS WITH STAGE 3 CHRONIC KIDNEY DISEASE, WITH LONG-TERM CURRENT USE OF INSULIN: Primary | ICD-10-CM

## 2019-03-27 DIAGNOSIS — I50.22 CHRONIC SYSTOLIC HEART FAILURE: ICD-10-CM

## 2019-03-27 DIAGNOSIS — N18.30 TYPE 2 DM WITH CKD STAGE 3 AND HYPERTENSION: ICD-10-CM

## 2019-03-27 DIAGNOSIS — E11.22 TYPE 2 DM WITH CKD STAGE 3 AND HYPERTENSION: ICD-10-CM

## 2019-03-27 DIAGNOSIS — E11.42 DIABETIC POLYNEUROPATHY ASSOCIATED WITH TYPE 2 DIABETES MELLITUS: ICD-10-CM

## 2019-03-27 DIAGNOSIS — Z79.4 TYPE 2 DIABETES MELLITUS WITH STAGE 3 CHRONIC KIDNEY DISEASE, WITH LONG-TERM CURRENT USE OF INSULIN: Primary | ICD-10-CM

## 2019-03-27 LAB
BACTERIA #/AREA URNS AUTO: ABNORMAL /HPF
BILIRUB UR QL STRIP: NEGATIVE
CLARITY UR REFRACT.AUTO: CLEAR
COLOR UR AUTO: YELLOW
CREAT UR-MCNC: 39 MG/DL (ref 15–325)
GLUCOSE UR QL STRIP: ABNORMAL
HGB UR QL STRIP: NEGATIVE
HYALINE CASTS UR QL AUTO: 0 /LPF
KETONES UR QL STRIP: NEGATIVE
LEUKOCYTE ESTERASE UR QL STRIP: ABNORMAL
MICROSCOPIC COMMENT: ABNORMAL
NITRITE UR QL STRIP: NEGATIVE
PH UR STRIP: 6 [PH] (ref 5–8)
PROT UR QL STRIP: ABNORMAL
PROT UR-MCNC: 53 MG/DL (ref 0–15)
PROT/CREAT UR: 1.36 MG/G{CREAT} (ref 0–0.2)
RBC #/AREA URNS AUTO: 0 /HPF (ref 0–4)
SP GR UR STRIP: 1.01 (ref 1–1.03)
SQUAMOUS #/AREA URNS AUTO: 4 /HPF
URN SPEC COLLECT METH UR: ABNORMAL
WBC #/AREA URNS AUTO: 24 /HPF (ref 0–5)

## 2019-03-27 PROCEDURE — 99214 PR OFFICE/OUTPT VISIT, EST, LEVL IV, 30-39 MIN: ICD-10-PCS | Mod: HCNC,S$GLB,, | Performed by: INTERNAL MEDICINE

## 2019-03-27 PROCEDURE — 1101F PT FALLS ASSESS-DOCD LE1/YR: CPT | Mod: HCNC,CPTII,S$GLB, | Performed by: INTERNAL MEDICINE

## 2019-03-27 PROCEDURE — 99999 PR PBB SHADOW E&M-EST. PATIENT-LVL III: CPT | Mod: PBBFAC,HCNC,, | Performed by: INTERNAL MEDICINE

## 2019-03-27 PROCEDURE — 3079F PR MOST RECENT DIASTOLIC BLOOD PRESSURE 80-89 MM HG: ICD-10-PCS | Mod: HCNC,CPTII,S$GLB, | Performed by: INTERNAL MEDICINE

## 2019-03-27 PROCEDURE — 99999 PR PBB SHADOW E&M-EST. PATIENT-LVL III: ICD-10-PCS | Mod: PBBFAC,HCNC,, | Performed by: INTERNAL MEDICINE

## 2019-03-27 PROCEDURE — 3077F PR MOST RECENT SYSTOLIC BLOOD PRESSURE >= 140 MM HG: ICD-10-PCS | Mod: HCNC,CPTII,S$GLB, | Performed by: INTERNAL MEDICINE

## 2019-03-27 PROCEDURE — 3077F SYST BP >= 140 MM HG: CPT | Mod: HCNC,CPTII,S$GLB, | Performed by: INTERNAL MEDICINE

## 2019-03-27 PROCEDURE — 1101F PR PT FALLS ASSESS DOC 0-1 FALLS W/OUT INJ PAST YR: ICD-10-PCS | Mod: HCNC,CPTII,S$GLB, | Performed by: INTERNAL MEDICINE

## 2019-03-27 PROCEDURE — 99214 OFFICE O/P EST MOD 30 MIN: CPT | Mod: HCNC,S$GLB,, | Performed by: INTERNAL MEDICINE

## 2019-03-27 PROCEDURE — 3079F DIAST BP 80-89 MM HG: CPT | Mod: HCNC,CPTII,S$GLB, | Performed by: INTERNAL MEDICINE

## 2019-03-27 PROCEDURE — 82570 ASSAY OF URINE CREATININE: CPT | Mod: HCNC

## 2019-03-27 PROCEDURE — 81001 URINALYSIS AUTO W/SCOPE: CPT | Mod: HCNC

## 2019-03-27 RX ORDER — INSULIN GLARGINE 100 [IU]/ML
30 INJECTION, SOLUTION SUBCUTANEOUS NIGHTLY
Qty: 27 ML | Refills: 3 | Status: SHIPPED | OUTPATIENT
Start: 2019-03-27 | End: 2019-10-15 | Stop reason: SDUPTHER

## 2019-03-27 RX ORDER — DILTIAZEM HYDROCHLORIDE 120 MG/1
120 CAPSULE, COATED, EXTENDED RELEASE ORAL DAILY
Qty: 90 CAPSULE | Refills: 3 | Status: SHIPPED | OUTPATIENT
Start: 2019-03-27 | End: 2020-04-23

## 2019-03-27 NOTE — TELEPHONE ENCOUNTER
----- Message from Kaitlyn Barrett MA sent at 3/27/2019 10:34 AM CDT -----  Contact: [facility] [phone]      ----- Message -----  From: Xiao Tao  Sent: 3/27/2019  10:17 AM  To: Salvatore DSOUZA Staff    .Needs Advice    Reason for call:        Communication Preference:phone  Additional Information: States they faxed over a form to make sure doctor did prescribe pt Lantuss states they haven't heard anything back please call back to discuss

## 2019-03-27 NOTE — PROGRESS NOTES
NEPHROLOGY PROGRESS NOTE    Subjective:       Patient ID: Myesha Quinones is a 78 y.o. White female who presents for follow up evaluation of renal function. She was previously seen by Dr. Azul.     HPI   77 yo white female with a PMH of HTN, DM, nonischemic cardiomyopathy (AICD in place), HFpEF (50%), COPD, multinodular goiter, CKD3 baseline crt has historically been about 1.2-1.4. New AICD place din January.  Microalbuminuria has increased over the las 7 years from 69 ug/mg crt to 780 ug/mg crt, on irbesartan. HbgA1c 6.6-7.3. 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  CT abd 12/ 2017.  The kidneys are normal in size and location.  There is a 1.9 cm lesion within the left kidney demonstrating mixed fat and soft tissue attenuation consistent with an angiomyolipoma.  No hydronephrosis is seen.  She arrives today for follow up. The patient denies taking NSAIDs or new antibiotics, recreational drugs, recent episode of dehydration, diarrhea, nausea or vomiting, acute illness, hospitalization or exposure to IV radiocontrast.       Review of Systems   Constitutional: Negative for appetite change, chills, fatigue, fever and unexpected weight change.   HENT: Negative for congestion, facial swelling, hearing loss, nosebleeds and trouble swallowing.    Eyes: Negative for pain, discharge, redness and visual disturbance.   Respiratory: Negative for cough, chest tightness, shortness of breath and wheezing.    Cardiovascular: Negative for chest pain, palpitations and leg swelling.   Gastrointestinal: Negative for abdominal pain, constipation, diarrhea, nausea and vomiting.   Endocrine: Negative for cold intolerance, heat intolerance and polydipsia.   Genitourinary: Negative for decreased urine volume, difficulty urinating, dysuria, flank pain, hematuria and urgency.   Musculoskeletal: Negative for arthralgias, back pain, joint swelling and myalgias.   Skin: Negative for color change, pallor, rash and  "wound.   Neurological: Negative for dizziness, tremors, seizures, syncope, speech difficulty, weakness and headaches.   Hematological: Negative for adenopathy. Does not bruise/bleed easily.   Psychiatric/Behavioral: Negative for agitation, behavioral problems, dysphoric mood, self-injury and sleep disturbance.       Objective:     Blood pressure (!) 180/80, pulse 92, height 5' 3" (1.6 m), weight 72 kg (158 lb 11.7 oz), SpO2 96 %.    Physical Exam   Constitutional: She is oriented to person, place, and time. She appears well-developed and well-nourished. No distress.   HENT:   Head: Normocephalic and atraumatic.   Mouth/Throat: No oropharyngeal exudate.   Eyes: Pupils are equal, round, and reactive to light. Conjunctivae and EOM are normal. Right eye exhibits no discharge. Left eye exhibits no discharge. No scleral icterus.   Neck: Normal range of motion. Neck supple. No JVD present. No tracheal deviation present. No thyromegaly present.   Cardiovascular: Normal rate, regular rhythm, normal heart sounds and intact distal pulses. Exam reveals no gallop.   No murmur heard.  minimal LE edema bilateral    Pulmonary/Chest: Effort normal and breath sounds normal. No stridor. No respiratory distress. She has no wheezes. She has no rales. She exhibits no tenderness.   Abdominal: Soft. Bowel sounds are normal. She exhibits no distension and no mass. There is no tenderness. There is no rebound and no guarding. No hernia.   Musculoskeletal: She exhibits no edema or tenderness.   Lymphadenopathy:     She has no cervical adenopathy.   Neurological: She is alert and oriented to person, place, and time. She exhibits normal muscle tone.   Skin: Skin is warm and dry. No rash noted. She is not diaphoretic. No erythema.   Psychiatric: She has a normal mood and affect. Judgment and thought content normal.   Nursing note and vitals reviewed.      LABS  Serum Cr 1.4 mg/dL  UPCR 1.8 g/g    Assessment:       1. Type 2 DM with CKD stage 3 " and hypertension. 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 There was a concern for ANCA-associated GN but PR3 was negative. Will order MPO for completeness. However, the patient has had stable kidney function for 1 year and minimal to no hematuria. Therefore, the clinical course and features are not c/w renal AAV.    2. Proteinuria, unspecified type. Already on ARB and thiazide but BP is elevated. Claims white coat HTN. Still elevated at home, at times. Will add diltiazem.        Plan:       1. Repeat UA, UPCR  2. Repeat PR3 and MPO antibodies  3. Continue irbesartan and chlorthalidone  4. Tight glycemic control, home BP monitoring  5. Start diltiazem 120 mg XL qd  6. Cancel kidney biopsy  7. Avoid NSAIDs  8. RTC in 3 mo

## 2019-03-28 ENCOUNTER — TELEPHONE (OUTPATIENT)
Dept: ENDOCRINOLOGY | Facility: CLINIC | Age: 79
End: 2019-03-28

## 2019-03-28 ENCOUNTER — TELEPHONE (OUTPATIENT)
Dept: NEPHROLOGY | Facility: CLINIC | Age: 79
End: 2019-03-28

## 2019-03-28 NOTE — TELEPHONE ENCOUNTER
----- Message from Kaitlyn Barrett MA sent at 3/28/2019 10:25 AM CDT -----  Contact: [phone]      ----- Message -----  From: Virginia Lafleur  Sent: 3/28/2019  10:19 AM  To: Kaitlyn Barrett MA    [facility] called to check status of a form that was faxed over for SoloStar insulin.     Pls include up to 100 units per day on Lantus prescription. [facility] will fax form again today.    ..  [facility]/pharmacy #[id number] - [place] - [address] AT [facility]  [address] [address]  Phone: [phone] Fax: [phone]

## 2019-03-28 NOTE — TELEPHONE ENCOUNTER
Called pharmacy to inform them the message below per dr sorensen. Pharmacist verberally understood  ----- Message from Chinedu Sorensen MD sent at 3/27/2019  7:24 PM CDT -----  Contact: [facility] [phone]  It's fine. She can take the new medication (diltiazem). I will decrease the dose of Zocor.    ----- Message -----  From: Shirlene Olmstead MA  Sent: 3/27/2019   3:56 PM  To: Chinedu Sorensen MD    Please advise before refilling. What you want to do ?  ----- Message -----  From: Virginia Lafleur  Sent: 3/27/2019   3:49 PM  To: Anna Wallace  Staff    .Medication question / problems.  diltiaZEM (CARDIZEM CD) 120 MG Cp24 interacts with simvastatin (ZOCOR) 20 MG tablet which causes an increased risk of simvastatin increasing.    ..  [facility]/pharmacy #[id number] - [place] - [address] AT [facility]  [address] [address]  Phone: [phone] Fax: [phone]

## 2019-03-28 NOTE — TELEPHONE ENCOUNTER
Talk to phramacy rep Hi. He stated that pt lantus was filled they do not need anything else from us.

## 2019-04-03 ENCOUNTER — LAB VISIT (OUTPATIENT)
Dept: LAB | Facility: HOSPITAL | Age: 79
End: 2019-04-03
Payer: MEDICARE

## 2019-04-03 ENCOUNTER — OFFICE VISIT (OUTPATIENT)
Dept: INTERNAL MEDICINE | Facility: CLINIC | Age: 79
End: 2019-04-03
Payer: MEDICARE

## 2019-04-03 VITALS
HEART RATE: 71 BPM | TEMPERATURE: 98 F | BODY MASS INDEX: 28.13 KG/M2 | HEIGHT: 63 IN | WEIGHT: 158.75 LBS | SYSTOLIC BLOOD PRESSURE: 130 MMHG | OXYGEN SATURATION: 97 % | DIASTOLIC BLOOD PRESSURE: 60 MMHG

## 2019-04-03 DIAGNOSIS — N18.30 CHRONIC KIDNEY DISEASE, STAGE 3, MOD DECREASED GFR: ICD-10-CM

## 2019-04-03 DIAGNOSIS — D32.9 MENINGIOMA: ICD-10-CM

## 2019-04-03 DIAGNOSIS — Z79.4 CONTROLLED TYPE 2 DIABETES MELLITUS WITH BOTH EYES AFFECTED BY MILD NONPROLIFERATIVE RETINOPATHY AND MACULAR EDEMA, WITH LONG-TERM CURRENT USE OF INSULIN: ICD-10-CM

## 2019-04-03 DIAGNOSIS — N18.30 TYPE 2 DIABETES MELLITUS WITH STAGE 3 CHRONIC KIDNEY DISEASE, WITH LONG-TERM CURRENT USE OF INSULIN: ICD-10-CM

## 2019-04-03 DIAGNOSIS — I25.10 CORONARY ARTERY DISEASE INVOLVING NATIVE CORONARY ARTERY OF NATIVE HEART WITHOUT ANGINA PECTORIS: ICD-10-CM

## 2019-04-03 DIAGNOSIS — I25.5 CARDIOMYOPATHY, ISCHEMIC: ICD-10-CM

## 2019-04-03 DIAGNOSIS — H35.033 HYPERTENSIVE RETINOPATHY, BILATERAL: ICD-10-CM

## 2019-04-03 DIAGNOSIS — R91.8 MULTIPLE LUNG NODULES: ICD-10-CM

## 2019-04-03 DIAGNOSIS — I10 ESSENTIAL HYPERTENSION: ICD-10-CM

## 2019-04-03 DIAGNOSIS — M89.8X9 METABOLIC BONE DISEASE: ICD-10-CM

## 2019-04-03 DIAGNOSIS — I50.22 CHRONIC SYSTOLIC HEART FAILURE: ICD-10-CM

## 2019-04-03 DIAGNOSIS — Z85.3 HISTORY OF BREAST CANCER: ICD-10-CM

## 2019-04-03 DIAGNOSIS — Z95.810 BIVENTRICULAR ICD (IMPLANTABLE CARDIOVERTER-DEFIBRILLATOR) IN PLACE: ICD-10-CM

## 2019-04-03 DIAGNOSIS — N18.30 TYPE 2 DM WITH CKD STAGE 3 AND HYPERTENSION: ICD-10-CM

## 2019-04-03 DIAGNOSIS — Z85.828 HISTORY OF NONMELANOMA SKIN CANCER: ICD-10-CM

## 2019-04-03 DIAGNOSIS — Z00.00 ENCOUNTER FOR PREVENTIVE HEALTH EXAMINATION: Primary | ICD-10-CM

## 2019-04-03 DIAGNOSIS — E11.42 DIABETIC POLYNEUROPATHY ASSOCIATED WITH TYPE 2 DIABETES MELLITUS: ICD-10-CM

## 2019-04-03 DIAGNOSIS — E11.22 TYPE 2 DM WITH CKD STAGE 3 AND HYPERTENSION: ICD-10-CM

## 2019-04-03 DIAGNOSIS — Z79.4 TYPE 2 DIABETES MELLITUS WITH STAGE 3 CHRONIC KIDNEY DISEASE, WITH LONG-TERM CURRENT USE OF INSULIN: ICD-10-CM

## 2019-04-03 DIAGNOSIS — I42.8 NONISCHEMIC CARDIOMYOPATHY: ICD-10-CM

## 2019-04-03 DIAGNOSIS — I12.9 TYPE 2 DM WITH CKD STAGE 3 AND HYPERTENSION: ICD-10-CM

## 2019-04-03 DIAGNOSIS — E11.3213 CONTROLLED TYPE 2 DIABETES MELLITUS WITH BOTH EYES AFFECTED BY MILD NONPROLIFERATIVE RETINOPATHY AND MACULAR EDEMA, WITH LONG-TERM CURRENT USE OF INSULIN: ICD-10-CM

## 2019-04-03 DIAGNOSIS — J44.9 CHRONIC OBSTRUCTIVE PULMONARY DISEASE, UNSPECIFIED COPD TYPE: ICD-10-CM

## 2019-04-03 DIAGNOSIS — I70.0 CALCIFICATION OF AORTA: ICD-10-CM

## 2019-04-03 DIAGNOSIS — T45.1X5A CHEMOTHERAPY-INDUCED NEUROPATHY: ICD-10-CM

## 2019-04-03 DIAGNOSIS — H90.6 MIXED CONDUCTIVE AND SENSORINEURAL HEARING LOSS OF BOTH EARS: ICD-10-CM

## 2019-04-03 DIAGNOSIS — E11.22 TYPE 2 DIABETES MELLITUS WITH STAGE 3 CHRONIC KIDNEY DISEASE, WITH LONG-TERM CURRENT USE OF INSULIN: ICD-10-CM

## 2019-04-03 DIAGNOSIS — G62.0 CHEMOTHERAPY-INDUCED NEUROPATHY: ICD-10-CM

## 2019-04-03 LAB
ALBUMIN SERPL BCP-MCNC: 3.5 G/DL (ref 3.5–5.2)
ALP SERPL-CCNC: 82 U/L (ref 55–135)
ALT SERPL W/O P-5'-P-CCNC: 17 U/L (ref 10–44)
ANION GAP SERPL CALC-SCNC: 9 MMOL/L (ref 8–16)
AST SERPL-CCNC: 16 U/L (ref 10–40)
BILIRUB SERPL-MCNC: 0.4 MG/DL (ref 0.1–1)
BUN SERPL-MCNC: 35 MG/DL (ref 8–23)
CALCIUM SERPL-MCNC: 9.6 MG/DL (ref 8.7–10.5)
CHLORIDE SERPL-SCNC: 98 MMOL/L (ref 95–110)
CO2 SERPL-SCNC: 29 MMOL/L (ref 23–29)
CREAT SERPL-MCNC: 1.6 MG/DL (ref 0.5–1.4)
ERYTHROCYTE [DISTWIDTH] IN BLOOD BY AUTOMATED COUNT: 12.8 % (ref 11.5–14.5)
EST. GFR  (AFRICAN AMERICAN): 35 ML/MIN/1.73 M^2
EST. GFR  (NON AFRICAN AMERICAN): 30 ML/MIN/1.73 M^2
GLUCOSE SERPL-MCNC: 280 MG/DL (ref 70–110)
HCT VFR BLD AUTO: 34.2 % (ref 37–48.5)
HGB BLD-MCNC: 10.9 G/DL (ref 12–16)
MCH RBC QN AUTO: 30 PG (ref 27–31)
MCHC RBC AUTO-ENTMCNC: 31.9 G/DL (ref 32–36)
MCV RBC AUTO: 94 FL (ref 82–98)
NEUTROPHILS # BLD AUTO: 4.6 K/UL (ref 1.8–7.7)
PLATELET # BLD AUTO: 192 K/UL (ref 150–350)
PMV BLD AUTO: 11.7 FL (ref 9.2–12.9)
POTASSIUM SERPL-SCNC: 4.2 MMOL/L (ref 3.5–5.1)
PROT SERPL-MCNC: 7 G/DL (ref 6–8.4)
RBC # BLD AUTO: 3.63 M/UL (ref 4–5.4)
SODIUM SERPL-SCNC: 136 MMOL/L (ref 136–145)
WBC # BLD AUTO: 7.52 K/UL (ref 3.9–12.7)

## 2019-04-03 PROCEDURE — 80053 COMPREHEN METABOLIC PANEL: CPT | Mod: HCNC

## 2019-04-03 PROCEDURE — 3075F PR MOST RECENT SYSTOLIC BLOOD PRESS GE 130-139MM HG: ICD-10-PCS | Mod: HCNC,CPTII,S$GLB, | Performed by: NURSE PRACTITIONER

## 2019-04-03 PROCEDURE — 85027 COMPLETE CBC AUTOMATED: CPT | Mod: HCNC

## 2019-04-03 PROCEDURE — 99999 PR PBB SHADOW E&M-EST. PATIENT-LVL IV: ICD-10-PCS | Mod: PBBFAC,HCNC,, | Performed by: NURSE PRACTITIONER

## 2019-04-03 PROCEDURE — 3078F DIAST BP <80 MM HG: CPT | Mod: HCNC,CPTII,S$GLB, | Performed by: NURSE PRACTITIONER

## 2019-04-03 PROCEDURE — 3078F PR MOST RECENT DIASTOLIC BLOOD PRESSURE < 80 MM HG: ICD-10-PCS | Mod: HCNC,CPTII,S$GLB, | Performed by: NURSE PRACTITIONER

## 2019-04-03 PROCEDURE — G0439 PPPS, SUBSEQ VISIT: HCPCS | Mod: HCNC,S$GLB,, | Performed by: NURSE PRACTITIONER

## 2019-04-03 PROCEDURE — 99999 PR PBB SHADOW E&M-EST. PATIENT-LVL IV: CPT | Mod: PBBFAC,HCNC,, | Performed by: NURSE PRACTITIONER

## 2019-04-03 PROCEDURE — G0439 PR MEDICARE ANNUAL WELLNESS SUBSEQUENT VISIT: ICD-10-PCS | Mod: HCNC,S$GLB,, | Performed by: NURSE PRACTITIONER

## 2019-04-03 PROCEDURE — 3075F SYST BP GE 130 - 139MM HG: CPT | Mod: HCNC,CPTII,S$GLB, | Performed by: NURSE PRACTITIONER

## 2019-04-03 PROCEDURE — 36415 COLL VENOUS BLD VENIPUNCTURE: CPT | Mod: HCNC

## 2019-04-03 NOTE — PATIENT INSTRUCTIONS
Counseling and Referral of Other Preventative  (Italic type indicates deductible and co-insurance are waived)    Patient Name: Myesha Quinones  Today's Date: 4/3/2019    Health Maintenance       Date Due Completion Date    Hemoglobin A1c 06/19/2019 12/19/2018    Colonoscopy 08/27/2019 8/27/2014    Lipid Panel 12/19/2019 12/19/2018    Foot Exam 01/28/2020 1/28/2019    Override on 6/4/2015: Done    Eye Exam 02/14/2020 2/14/2019    Aspirin/Antiplatelet Therapy 03/27/2020 3/27/2019    DEXA SCAN 04/30/2020 4/30/2018    TETANUS VACCINE 07/22/2024 7/22/2014        No orders of the defined types were placed in this encounter.    The following information is provided to all patients.  This information is to help you find resources for any of the problems found today that may be affecting your health:                Living healthy guide: www.[facility].[place].[facility]      Understanding Diabetes: www.diabetes.org      Eating healthy: www.cdc.gov/healthyweight      [facility] home safety checklist: www.cdc.gov/steadi/patient.html      Agency on Aging: www.goea.[place].[facility]      Alcoholics anonymous (AA): www.aa.org      Physical Activity: www.oanh.nih.gov/kz9eisx      Tobacco use: www.quitwithusla.org

## 2019-04-03 NOTE — PROGRESS NOTES
"Myesha Quinones presented for a  Medicare AWV and comprehensive Health Risk Assessment today. The following components were reviewed and updated:    · Medical history  · Family History  · Social history  · Allergies and Current Medications  · Health Risk Assessment  · Health Maintenance  · Care Team     ** See Completed Assessments for Annual Wellness Visit within the encounter summary.**       The following assessments were completed:  · Living Situation  · CAGE  · Depression Screening  · Timed Get Up and Go  · Whisper Test--not done wears hearing aids  · Cognitive Function Screening        · Nutrition Screening  · ADL Screening  · PAQ Screening    Vitals:    04/03/19 0850 04/03/19 0915   BP: (!) 150/64 130/60   BP Location: Left arm    Patient Position: Sitting    BP Method: Large (Manual)    Pulse: 71    Temp: 97.8 °F (36.6 °C)    SpO2: 97%    Weight: 72 kg (158 lb 11.7 oz)    Height: 5' 3" (1.6 m)      Body mass index is 28.12 kg/m².  Physical Exam   Constitutional: She is oriented to person, place, and time. Vital signs are normal. She appears well-developed and well-nourished.   HENT:   Head: Normocephalic.   Right Ear: Hearing, tympanic membrane, external ear and ear canal normal.   Left Ear: Hearing, tympanic membrane, external ear and ear canal normal.   Eyes: Pupils are equal, round, and reactive to light. Conjunctivae, EOM and lids are normal. Lids are everted and swept, no foreign bodies found.   Neck: Trachea normal, normal range of motion and full passive range of motion without pain. Neck supple. No JVD present. Carotid bruit is not present.   Cardiovascular: Normal rate, regular rhythm, S1 normal, S2 normal, normal heart sounds, intact distal pulses and normal pulses.   Pulmonary/Chest: Effort normal and breath sounds normal.   Abdominal: Soft. Normal appearance and bowel sounds are normal. There is no hepatosplenomegaly.   Musculoskeletal: Normal range of motion.   Neurological: She is alert and " oriented to person, place, and time. She has normal strength and normal reflexes. She displays tremor.   Essential tremor noted of both hands   Skin: Skin is warm, dry and intact. Capillary refill takes less than 2 seconds.   Psychiatric: She has a normal mood and affect. Her speech is normal and behavior is normal. Judgment and thought content normal. Cognition and memory are normal.   Nursing note and vitals reviewed.        Diagnoses and health risks identified today and associated recommendations/orders:    1. Encounter for preventive health examination  Exam done    Health Maintenance updated    Records reviewed    2. Diabetic polyneuropathy associated with type 2 diabetes mellitus  Chronic, followed by PCP    A1C goal < 7.0, BP goal < 140/80, LDL goal < 100.    Adhere to ADA diet.    Take meds as prescribed, check BS daily. Notify if sugars are out of range discussed during visit.    Yearly eye exam discussed.    Yearly foot exam discussed.    3. Chemotherapy-induced neuropathy  Chronic, followed by PCP and Hem/Onc    4. 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    Adhere to ADA diet.    Take meds as prescribed, check BS daily. Notify if sugars are out of range discussed during visit.    Yearly eye exam discussed.    Yearly foot exam discussed.      5. Hypertensive retinopathy, bilateral  Chronic, followed by Opthalmology    6. Mixed conductive and sensorineural hearing loss of both ears  Chronic, wears hearing aids    7. Chronic obstructive pulmonary disease, unspecified COPD type  Chronic, followed by PCP    8. Multiple lung nodules  Chronic, followed by PCP    Seen on previous imaging, stable    9. Calcification of aorta  Chronic, followed by PCP    10. Biventricular ICD (implantable cardioverter-defibrillator) in  place  Chronic, followed by Cardiology    11. Cardiomyopathy, ischemic  Chronic, followed by Cardiology    12. Nonischemic cardiomyopathy  Chronic, followed by Cardiology    13. Essential hypertension  Stable, followed by PCP    Take medications as prescribed.    Monitor BP at home, goal BP < or = 140/80, call office if consistently above this range.    Follow low salt DASH diet and exercise.    BMI reviewed.    Go to ED if Headaches, blurred vision, chest pain, or SOB occurs along with elevated readings > or = 160/90.    14. Chronic systolic heart failure  Chronic, followed by Cardiology    15. Coronary artery disease involving native coronary artery of native heart without angina pectoris  Chronic, followed by Cardiology    16. Chronic kidney disease, stage 3, mod decreased GFR  Chronic, followed by PCP    Goal BP < 140/80, LDL goal < 100, low salt diet, avoid otc NSAIDs.    17. Meningioma  Chronic, followed by PCP    Stable    18. History of nonmelanoma skin cancer  Resolved    19. Type 2 diabetes mellitus with stage 3 chronic kidney disease, with long-term current use of insulin  Chronic, followed by PCP    A1C goal < 7.0, BP goal < 140/80, LDL goal < 100.    Adhere to ADA diet.    Take meds as prescribed, check BS daily. Notify if sugars are out of range discussed during visit.    Yearly eye exam discussed.    Yearly foot exam discussed.    20. Type 2 DM with CKD stage 3 and hypertension  Chronic, followed by PCP    A1C goal < 7.0, BP goal < 140/80, LDL goal < 100.    Adhere to ADA diet.    Take meds as prescribed, check BS daily. Notify if sugars are out of range discussed during visit.    Yearly eye exam discussed.    Yearly foot exam discussed.    21. Metabolic bone disease  Chronic, followed by Hem/onc      Provided Myesha with a 5-10 year written screening schedule and personal prevention plan. Recommendations were developed using the USPSTF age appropriate recommendations. Education, counseling, and  referrals were provided as needed. After Visit Summary printed and given to patient which includes a list of additional screenings\tests needed.    Follow up in about 3 months (around 6/19/2019), or with PCP Dr. Gaston as scheduled.    Sherri Ochoa DNP  I offered to discuss end of life issues, including information on how to make advance directives that the patient could use to name someone who would make medical decisions on their behalf if they became too ill to make themselves.    ___Patient declined  _X_Patient is interested, I provided paper work and offered to discuss.

## 2019-04-09 ENCOUNTER — OFFICE VISIT (OUTPATIENT)
Dept: HEMATOLOGY/ONCOLOGY | Facility: CLINIC | Age: 79
End: 2019-04-09
Payer: MEDICARE

## 2019-04-09 VITALS
OXYGEN SATURATION: 95 % | BODY MASS INDEX: 28.24 KG/M2 | RESPIRATION RATE: 20 BRPM | TEMPERATURE: 98 F | SYSTOLIC BLOOD PRESSURE: 189 MMHG | WEIGHT: 159.38 LBS | HEIGHT: 63 IN | DIASTOLIC BLOOD PRESSURE: 81 MMHG | HEART RATE: 79 BPM

## 2019-04-09 DIAGNOSIS — Z85.3 HISTORY OF BREAST CANCER: Primary | ICD-10-CM

## 2019-04-09 PROCEDURE — 3077F PR MOST RECENT SYSTOLIC BLOOD PRESSURE >= 140 MM HG: ICD-10-PCS | Mod: HCNC,CPTII,S$GLB, | Performed by: INTERNAL MEDICINE

## 2019-04-09 PROCEDURE — 99499 RISK ADDL DX/OHS AUDIT: ICD-10-PCS | Mod: HCNC,S$GLB,, | Performed by: INTERNAL MEDICINE

## 2019-04-09 PROCEDURE — 99213 PR OFFICE/OUTPT VISIT, EST, LEVL III, 20-29 MIN: ICD-10-PCS | Mod: HCNC,S$GLB,, | Performed by: INTERNAL MEDICINE

## 2019-04-09 PROCEDURE — 1101F PT FALLS ASSESS-DOCD LE1/YR: CPT | Mod: HCNC,CPTII,S$GLB, | Performed by: INTERNAL MEDICINE

## 2019-04-09 PROCEDURE — 99499 UNLISTED E&M SERVICE: CPT | Mod: HCNC,S$GLB,, | Performed by: INTERNAL MEDICINE

## 2019-04-09 PROCEDURE — 99999 PR PBB SHADOW E&M-EST. PATIENT-LVL III: CPT | Mod: PBBFAC,HCNC,, | Performed by: INTERNAL MEDICINE

## 2019-04-09 PROCEDURE — 3079F DIAST BP 80-89 MM HG: CPT | Mod: HCNC,CPTII,S$GLB, | Performed by: INTERNAL MEDICINE

## 2019-04-09 PROCEDURE — 3079F PR MOST RECENT DIASTOLIC BLOOD PRESSURE 80-89 MM HG: ICD-10-PCS | Mod: HCNC,CPTII,S$GLB, | Performed by: INTERNAL MEDICINE

## 2019-04-09 PROCEDURE — 1101F PR PT FALLS ASSESS DOC 0-1 FALLS W/OUT INJ PAST YR: ICD-10-PCS | Mod: HCNC,CPTII,S$GLB, | Performed by: INTERNAL MEDICINE

## 2019-04-09 PROCEDURE — 3077F SYST BP >= 140 MM HG: CPT | Mod: HCNC,CPTII,S$GLB, | Performed by: INTERNAL MEDICINE

## 2019-04-09 PROCEDURE — 99999 PR PBB SHADOW E&M-EST. PATIENT-LVL III: ICD-10-PCS | Mod: PBBFAC,HCNC,, | Performed by: INTERNAL MEDICINE

## 2019-04-09 PROCEDURE — 99213 OFFICE O/P EST LOW 20 MIN: CPT | Mod: HCNC,S$GLB,, | Performed by: INTERNAL MEDICINE

## 2019-04-09 NOTE — PROGRESS NOTES
Subjective:       Patient ID: Myesha Quinones is a 78 y.o. female.    Chief Complaint: Results (mammogram)    HPI     Returns for annual follow-up, labs and mammogram.  In the interval she has overall been doing well  Reports in 1/2019 a new fiventricular ICD (implantable cardioverter-defibrillator) in place  Reports better HTN control on new medications    This is a 78-year-old female, who returns for her annual followup with a history of breast carcinoma. She is a previous patient of Dr. Amadeo Thomas. She was diagnosed with a right breast carcinoma in 1998 with a 2.5 cm infiltrating, poorly differentiated ductal carcinoma on mastectomy specimen, histologic grade III, nuclear grade II, and mitotic index 1 with negative margins and 2 out of 17 lymph nodes showing metastatic carcinoma.   The patient was treated with 4 cycles of Adriamycin and Cytoxan, followed by 4 cycles of Taxotere. She was then placed on tamoxifen for 5 years based on ER/TX status. She was then switched to Femara, and she completed 5 years in 2009.     Review of Systems   Constitutional: Positive for fatigue (with age). Negative for activity change, appetite change, chills, diaphoresis, fever and unexpected weight change.   HENT: Positive for hearing loss (wears hearing aids). Negative for congestion, dental problem, ear pain, mouth sores, nosebleeds, rhinorrhea, tinnitus and trouble swallowing.    Eyes: Negative for visual disturbance.   Respiratory: Positive for wheezing (off and on with asthma). Negative for cough, chest tightness and shortness of breath (better).         Up and down with asthma   Cardiovascular: Negative for chest pain, palpitations and leg swelling.   Gastrointestinal: Negative for abdominal distention, abdominal pain, blood in stool, constipation, diarrhea, nausea and vomiting.   Genitourinary: Negative for decreased urine volume, difficulty urinating, dysuria, frequency and hematuria.   Musculoskeletal: Positive for  arthralgias (chronic, unchanged). Negative for back pain, gait problem, joint swelling and neck pain.   Skin: Negative for color change, pallor, rash and wound.   Neurological: Negative for dizziness, weakness, light-headedness, numbness (chronic since chemotherapy) and headaches.   Hematological: Negative for adenopathy. Does not bruise/bleed easily.   Psychiatric/Behavioral: Negative for dysphoric mood and sleep disturbance. The patient is not nervous/anxious.        Objective:      Physical Exam   Constitutional: She is oriented to person, place, and time. She appears well-developed and well-nourished. No distress.   Presents alone   HENT:   Head: Normocephalic and atraumatic.   Right Ear: External ear normal.   Left Ear: External ear normal.   Nose: Nose normal.   Mouth/Throat: Oropharynx is clear and moist. No oropharyngeal exudate.   Eyes: Pupils are equal, round, and reactive to light. Conjunctivae and EOM are normal. No scleral icterus. Right pupil is round and reactive. Left pupil is round and reactive.   Neck: Normal range of motion. Neck supple. No thyromegaly present.   Cardiovascular: Normal rate, regular rhythm, normal heart sounds and intact distal pulses. Exam reveals no gallop and no friction rub.   No murmur heard.  Pulmonary/Chest: Effort normal and breath sounds normal. No respiratory distress. She has no wheezes. She has no rales.   Breasts- no masses, no LAD on left  ICD in place on left  Right breast post mastectomy without evidence of chest wall recurrence   Abdominal: Soft. Bowel sounds are normal. She exhibits no distension and no mass. There is no hepatosplenomegaly. There is no tenderness. There is no rebound and no guarding.   No organomegaly   Musculoskeletal: Normal range of motion. She exhibits no edema, tenderness or deformity.   Lymphadenopathy:        Head (right side): No submandibular adenopathy present.        Head (left side): No submandibular adenopathy present.     She has no  cervical adenopathy.        Right cervical: No superficial cervical, no deep cervical and no posterior cervical adenopathy present.       Left cervical: No superficial cervical, no deep cervical and no posterior cervical adenopathy present.        Right: No inguinal and no supraclavicular adenopathy present.        Left: No inguinal and no supraclavicular adenopathy present.   Neurological: She is alert and oriented to person, place, and time. She has normal strength. No cranial nerve deficit or sensory deficit. She exhibits normal muscle tone.   Skin: Skin is warm and dry. No bruising, no lesion, no petechiae and no rash noted. She is not diaphoretic. No erythema. No pallor.   Psychiatric: She has a normal mood and affect. Her behavior is normal. Judgment and thought content normal. Her mood appears not anxious. She does not exhibit a depressed mood.   Nursing note and vitals reviewed.    Labs- reviewed  Mammogram 3/2019 reviewed  Assessment:       1. History of breast cancer        Plan:     1.HEIDI clinically  Negative mammogram  Continue annual mammogram  No longer needs oncology follow up as is 10 years post therapy completion  She knows she can call for any issues or concerns

## 2019-04-10 ENCOUNTER — TELEPHONE (OUTPATIENT)
Dept: ADMINISTRATIVE | Facility: OTHER | Age: 79
End: 2019-04-10

## 2019-04-10 ENCOUNTER — PATIENT OUTREACH (OUTPATIENT)
Dept: OTHER | Facility: OTHER | Age: 79
End: 2019-04-10

## 2019-04-10 DIAGNOSIS — I10 ESSENTIAL HYPERTENSION: ICD-10-CM

## 2019-04-10 RX ORDER — CLONIDINE HYDROCHLORIDE 0.1 MG/1
0.1 TABLET ORAL NIGHTLY
Qty: 30 TABLET | Refills: 3 | OUTPATIENT
Start: 2019-04-10 | End: 2020-04-09

## 2019-04-10 NOTE — PROGRESS NOTES
Last 5 Patient Entered Readings                                      Current 30 Day Average: 153/70     Recent Readings 4/9/2019 4/8/2019 4/4/2019 4/2/2019 3/31/2019    SBP (mmHg) 137 137 135 152 154    DBP (mmHg) 58 64 63 71 71    Pulse 73 68 65 67 71          Digital Medicine: Health  Follow Up    Lifestyle Modifications:    1.Dietary Modifications (Sodium intake <2,000mg/day, food labels, dining out): Patient reports she is trying to monitor sodium intake. Patient was aware dining out has more sodium in meals. Patient is dining out 1-2x/wk. Patient declined resources for improving dietary habits. Encouraged patient to continue to monitor sodium intake.      2.Physical Activity: Patient has been working in the garden 2-3x/wk.     3.Medication Therapy: Patient has been compliant with the medication regimen. Patient reports she started a new medication for BP prescribed to her by Kidney doctor. Patient started this new medication 2 weeks ago. Patient is pleased with the new medication.     4.Patient has the following medication side effects/concerns: None.     Follow up with Randi Quinones completed. No further questions or concerns. Will continue to follow up to achieve health goals.

## 2019-04-10 NOTE — TELEPHONE ENCOUNTER
----- Message from Tejal Dowd sent at 3/11/2019  5:17 PM CDT -----  Contact: [phone] /[phone]   Pt is a former pt of Dr. Roth pt is calling to schedule an appt this week for abnormal creatine reading.    Thank you!

## 2019-04-11 DIAGNOSIS — I10 ESSENTIAL HYPERTENSION: ICD-10-CM

## 2019-04-11 RX ORDER — CLONIDINE HYDROCHLORIDE 0.1 MG/1
0.1 TABLET ORAL NIGHTLY
Qty: 30 TABLET | Refills: 3 | OUTPATIENT
Start: 2019-04-11 | End: 2020-04-10

## 2019-04-25 ENCOUNTER — PROCEDURE VISIT (OUTPATIENT)
Dept: OPHTHALMOLOGY | Facility: CLINIC | Age: 79
End: 2019-04-25
Payer: MEDICARE

## 2019-04-25 VITALS — SYSTOLIC BLOOD PRESSURE: 177 MMHG | HEART RATE: 98 BPM | DIASTOLIC BLOOD PRESSURE: 83 MMHG

## 2019-04-25 DIAGNOSIS — H35.033 HYPERTENSIVE RETINOPATHY, BILATERAL: ICD-10-CM

## 2019-04-25 DIAGNOSIS — Z79.4 CONTROLLED TYPE 2 DIABETES MELLITUS WITH BOTH EYES AFFECTED BY MILD NONPROLIFERATIVE RETINOPATHY AND MACULAR EDEMA, WITH LONG-TERM CURRENT USE OF INSULIN: Primary | ICD-10-CM

## 2019-04-25 DIAGNOSIS — E11.3213 CONTROLLED TYPE 2 DIABETES MELLITUS WITH BOTH EYES AFFECTED BY MILD NONPROLIFERATIVE RETINOPATHY AND MACULAR EDEMA, WITH LONG-TERM CURRENT USE OF INSULIN: Primary | ICD-10-CM

## 2019-04-25 PROCEDURE — 67028 INJECTION EYE DRUG: CPT | Mod: HCNC,LT,S$GLB, | Performed by: OPHTHALMOLOGY

## 2019-04-25 PROCEDURE — 92134 POSTERIOR SEGMENT OCT RETINA (OCULAR COHERENCE TOMOGRAPHY)-BOTH EYES: ICD-10-PCS | Mod: HCNC,S$GLB,, | Performed by: OPHTHALMOLOGY

## 2019-04-25 PROCEDURE — 67028 PR INJECT INTRAVITREAL PHARMCOLOGIC: ICD-10-PCS | Mod: HCNC,LT,S$GLB, | Performed by: OPHTHALMOLOGY

## 2019-04-25 PROCEDURE — 92014 PR EYE EXAM, EST PATIENT,COMPREHESV: ICD-10-PCS | Mod: 25,HCNC,S$GLB, | Performed by: OPHTHALMOLOGY

## 2019-04-25 PROCEDURE — 92134 CPTRZ OPH DX IMG PST SGM RTA: CPT | Mod: HCNC,S$GLB,, | Performed by: OPHTHALMOLOGY

## 2019-04-25 PROCEDURE — 92014 COMPRE OPH EXAM EST PT 1/>: CPT | Mod: 25,HCNC,S$GLB, | Performed by: OPHTHALMOLOGY

## 2019-04-25 NOTE — PATIENT INSTRUCTIONS

## 2019-04-25 NOTE — PROGRESS NOTES
"HPI     2 mo / OCT  DLS- 02/14/2019 Dr. Arechiga     Pt sts vision seems to be about the same sometimes better. Denies pain   (-)Flashes (-)Floaters  (-)Photophobia  (-)Glare      No gtts         OCT - OD No ME  OS - central increase    Prior FA - Foveal MA's OS   No NV of NP OU      A/P    1. 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 DME OS  S/p Avastin OS x 5  S/p Ozurdex OS x 3 12/18  Foveal MA"s OS - may need chronic pharmacotherapy    Doing well, will need chronic steroid    Ozurdex OS today    Iluvien in 2 weeks    3. HTN Ret OU  BS/BP/chol control    4. PCIOL OU  With small PCO      2 weeks Iluvien OS      Risks, benefits, and alternatives to treatment discussed in detail with the patient.  The patient voiced understanding and wished to proceed with the procedure    Injection Procedure Note:  Diagnosis: DME OS    Patient Identified and Time Out complete  Topical Proparacaine and Betadine. subconj lido  Inject Ozurdex OS at 6:00 @ 3.5-4mm posterior to limbus  Post Operative Dx: Same  Complications: None  Follow up as above.    "

## 2019-04-29 ENCOUNTER — PATIENT OUTREACH (OUTPATIENT)
Dept: OTHER | Facility: OTHER | Age: 79
End: 2019-04-29

## 2019-04-29 NOTE — PROGRESS NOTES
Last 5 Patient Entered Readings                                      Current 30 Day Average: 148/64     Recent Readings 5/7/2019 5/3/2019 5/1/2019 4/30/2019 4/28/2019    SBP (mmHg) 169 148 142 151 153    DBP (mmHg) 63 64 63 65 63    Pulse 81 65 70 72 71        Ms. Quinones's BP readings have been higher recently. She states she has been going to the hospital with her  to check on her brother pricila who is ill. She feels the stress of this is contributing to higher BP. Discussed increasing diltiazem if BP remains elevated.     Per 30 day average, 148/64 mmHg, patient's BP is not at goal.     Will continue to monitor regularly. Will follow up in 2-3 weeks, sooner if BP begins to trend upward or downward.    Asked patient to call or message with questions or concerns.     Current HTN regimen:  Hypertension Medications             carvedilol (COREG) 25 MG tablet TAKE 2 TABLETS (50 MG TOTAL) BY MOUTH 2 (TWO) TIMES DAILY.    chlorthalidone (HYGROTEN) 25 MG Tab Take 1 tablet (25 mg total) by mouth once daily.    cloNIDine 0.1 mg/24 hr td ptwk (CATAPRES) 0.1 mg/24 hr Place 1 patch onto the skin every 7 days.    diltiaZEM (CARDIZEM CD) 120 MG Cp24 Take 1 capsule (120 mg total) by mouth once daily.    hydrALAZINE (APRESOLINE) 100 MG tablet TAKE 1 TABLET (100 MG TOTAL) BY MOUTH 3 (THREE) TIMES DAILY.    irbesartan (AVAPRO) 300 MG tablet Take 1 tablet (300 mg total) by mouth every evening.    nitroGLYCERIN (NITROSTAT) 0.4 MG SL tablet Place 1 tablet under the tongue continuous prn.

## 2019-04-30 ENCOUNTER — CLINICAL SUPPORT (OUTPATIENT)
Dept: OTOLARYNGOLOGY | Facility: CLINIC | Age: 79
End: 2019-04-30

## 2019-04-30 DIAGNOSIS — Z46.1 ENCOUNTER FOR FITTING AND ADJUSTMENT OF HEARING AID OF BOTH EARS: Primary | ICD-10-CM

## 2019-04-30 PROCEDURE — V5299 PR HEARING SERVICE: ICD-10-PCS | Mod: CSM,,, | Performed by: AUDIOLOGIST-HEARING AID FITTER

## 2019-04-30 PROCEDURE — 99499 UNLISTED E&M SERVICE: CPT | Mod: S$GLB,,, | Performed by: AUDIOLOGIST-HEARING AID FITTER

## 2019-04-30 PROCEDURE — V5299 HEARING SERVICE: HCPCS | Mod: CSM,,, | Performed by: AUDIOLOGIST-HEARING AID FITTER

## 2019-04-30 PROCEDURE — 99499 NO LOS: ICD-10-PCS | Mod: S$GLB,,, | Performed by: AUDIOLOGIST-HEARING AID FITTER

## 2019-04-30 NOTE — PROGRESS NOTES
Susannah Carter, CCC-A  Audiologist - [facility] [address] [place] [address]  [email or web address]  [phone]    Patient: Myesha Quinones   MRN: [id number]  [address] [facility] [place]  Home Phone [phone]   Work Phone Not on file.   Mobile [phone]   : 1940  PELAYO: 2019      HEARING AID FOLLOW-UP      Discussed extended warranty coverage for 1 year    Programmed loaner   Reset to initial fit  LP  - size 1R  With 3.1 mm or greater vent  Patient reports new earmold feels good  Otoscopy Right ear clear  Calibrated and changed to experienced user non-linear to match old settings but still use Quattro (loaner) default  Removed Restaurant program to match only AA 1 pgm from previous settings   Verified features and beep indicators same  Reports feels good and sounds good in office    Once  Right hearing aid from repair, send Left hearing aid out    _______________________________  Susannah Carter, WINDY-A  Audiologist

## 2019-05-13 ENCOUNTER — TELEPHONE (OUTPATIENT)
Dept: SLEEP MEDICINE | Facility: CLINIC | Age: 79
End: 2019-05-13

## 2019-05-13 DIAGNOSIS — G47.33 OBSTRUCTIVE SLEEP APNEA: Primary | ICD-10-CM

## 2019-05-13 NOTE — TELEPHONE ENCOUNTER
----- Message from Smiley Ramírez sent at 5/10/2019  1:11 PM CDT -----  Contact: Sharon with [facility]            Name of Who is Calling: Sharon with [facility]      What is the request in detail: Sharon states a request was sent on 05/7 for CPAP supplies and she is checking the status of the Rx request. Please contact to further discuss and advise.        Can the clinic reply by [facility]: N      What Number to Call Back if not in [facility]: [phone]

## 2019-05-24 ENCOUNTER — CLINICAL SUPPORT (OUTPATIENT)
Dept: OTOLARYNGOLOGY | Facility: CLINIC | Age: 79
End: 2019-05-24
Payer: MEDICARE

## 2019-05-24 DIAGNOSIS — Z46.1 ENCOUNTER FOR FITTING AND ADJUSTMENT OF HEARING AID OF BOTH EARS: Primary | ICD-10-CM

## 2019-05-24 PROCEDURE — 99499 UNLISTED E&M SERVICE: CPT | Mod: S$GLB,,, | Performed by: AUDIOLOGIST-HEARING AID FITTER

## 2019-05-24 PROCEDURE — 99499 NO LOS: ICD-10-PCS | Mod: S$GLB,,, | Performed by: AUDIOLOGIST-HEARING AID FITTER

## 2019-05-24 NOTE — PROGRESS NOTES
Susannah Carter, CCC-A  Audiologist - [facility] [address] [place] [address]  [email or web address]  [phone]    Patient: Myesha Quinones   MRN: [id number]  [address] [facility] [place]  Home Phone [phone]   Work Phone Not on file.   Mobile [phone]   : 1940  PELAYO: 2019      HEARING AID FOLLOW-UP       Myesha Quinones reports glad to be getting her Right hearing aid back today.  Loaner was okay, but likes being able to adjust both sides.    Opened [facility] session in [facility]  Dispensed Right hearing aid from repair  Took  off demo and placed on her old Right hearing aid (with micro mold #[id number]) and then connected both to session to ensure all settings same as before  No changes to settings, saved AU    Opened loaner file and detached micromold from Left hearing aid going in for repair and attached to [place] loaner (other ear)  Removed Right hearing from fitting   Calibrated and saved with default settings - only change was allowed VC up and down     Call once Left hearing aid received from repair    _______________________________  Susannah Carter, WINDY-A  Audiologist

## 2019-05-30 DIAGNOSIS — I42.8 NONISCHEMIC CARDIOMYOPATHY: ICD-10-CM

## 2019-05-30 DIAGNOSIS — I10 ESSENTIAL HYPERTENSION: ICD-10-CM

## 2019-05-30 NOTE — TELEPHONE ENCOUNTER
----- Message from Zo Hylton sent at 5/30/2019  2:59 PM CDT -----  Contact: Self  .Rx Refill/Request     Is this a Refill or New Rx:  Refill-90 day  Rx Name and Strength:  irbesartan (AVAPRO) 300 MG tablet  Preferred Pharmacy with phone number: [facility],  [phone] Fax: [phone]  Communication Preference: [phone]  Additional Information: Rx is out or stock unable to refill, will need replacement.Thanks    SEE- 2/1/19Abdullahi

## 2019-05-31 DIAGNOSIS — N18.30 STAGE 3 CHRONIC KIDNEY DISEASE: Primary | ICD-10-CM

## 2019-05-31 RX ORDER — IRBESARTAN 300 MG/1
300 TABLET ORAL NIGHTLY
Qty: 90 TABLET | Refills: 3 | Status: SHIPPED | OUTPATIENT
Start: 2019-05-31 | End: 2020-06-05 | Stop reason: SDUPTHER

## 2019-06-03 ENCOUNTER — PATIENT OUTREACH (OUTPATIENT)
Dept: OTHER | Facility: OTHER | Age: 79
End: 2019-06-03

## 2019-06-03 NOTE — PROGRESS NOTES
Last 5 Patient Entered Readings                                      Current 30 Day Average: 144/62     Recent Readings 5/31/2019 5/30/2019 5/28/2019 5/23/2019 5/21/2019    SBP (mmHg) 162 138 142 128 152    DBP (mmHg) 74 53 60 56 66    Pulse 76 78 75 74 68          Sending patient message via [employer] for HC follow-up.

## 2019-06-06 ENCOUNTER — TELEPHONE (OUTPATIENT)
Dept: CARDIOLOGY | Facility: CLINIC | Age: 79
End: 2019-06-06

## 2019-06-06 ENCOUNTER — PATIENT OUTREACH (OUTPATIENT)
Dept: OTHER | Facility: OTHER | Age: 79
End: 2019-06-06

## 2019-06-06 DIAGNOSIS — N18.30 TYPE 2 DM WITH CKD STAGE 3 AND HYPERTENSION: ICD-10-CM

## 2019-06-06 DIAGNOSIS — I12.9 TYPE 2 DM WITH CKD STAGE 3 AND HYPERTENSION: ICD-10-CM

## 2019-06-06 DIAGNOSIS — E11.22 TYPE 2 DM WITH CKD STAGE 3 AND HYPERTENSION: ICD-10-CM

## 2019-06-06 DIAGNOSIS — E78.2 MIXED HYPERLIPIDEMIA: Primary | ICD-10-CM

## 2019-06-06 RX ORDER — PRAVASTATIN SODIUM 40 MG/1
40 TABLET ORAL DAILY
Qty: 90 TABLET | Refills: 3 | Status: SHIPPED | OUTPATIENT
Start: 2019-06-06 | End: 2020-06-05

## 2019-06-06 NOTE — PROGRESS NOTES
Last 5 Patient Entered Readings                                      Current 30 Day Average: 145/62     Recent Readings 6/5/2019 6/3/2019 5/31/2019 5/30/2019 5/28/2019    SBP (mmHg) 151 156 162 138 142    DBP (mmHg) 61 65 74 53 60    Pulse 72 73 76 78 75        Ms. Joni's BP readings remain above goal. Will increase diltiazem to 240 mg QD. HR is averaging mid 70s. She is currently taking simvastatin which interacts with diltiazem. Will message Dr. Fernando about changing her to a different statin.

## 2019-06-06 NOTE — TELEPHONE ENCOUNTER
----- Message from Jose Luis Fernando MD sent at 6/6/2019  1:03 PM CDT -----  Regarding: FW: Drug Interaction  Inform the patient I have switched her Simvastatin to Pravastatin in equivalent dose to avoid drug interactions..  ----- Message -----  From: Darline Armstrong, Miguel  Sent: 6/6/2019  12:16 PM  To: Jose Luis Fernando MD  Subject: Drug Interaction                                 Hi Dr. Fernando,    Dr. Castillo started Mrs. Quinones on diltiazem a couple months ago. She is also taking simvastatin 20 mg, which has a drug interaction with the dilt. I would like to increase the dilt for better BP control, but won't do it while she is taking simvastatin. Can you change her statin to rosuvastatin or pravastatin? Please advise.     Thank you,  Darline   [phone]

## 2019-06-07 ENCOUNTER — PATIENT OUTREACH (OUTPATIENT)
Dept: ADMINISTRATIVE | Facility: HOSPITAL | Age: 79
End: 2019-06-07

## 2019-06-07 DIAGNOSIS — Z79.4 CONTROLLED TYPE 2 DIABETES MELLITUS WITH BOTH EYES AFFECTED BY MILD NONPROLIFERATIVE RETINOPATHY AND MACULAR EDEMA, WITH LONG-TERM CURRENT USE OF INSULIN: Primary | ICD-10-CM

## 2019-06-07 DIAGNOSIS — E11.3213 CONTROLLED TYPE 2 DIABETES MELLITUS WITH BOTH EYES AFFECTED BY MILD NONPROLIFERATIVE RETINOPATHY AND MACULAR EDEMA, WITH LONG-TERM CURRENT USE OF INSULIN: Primary | ICD-10-CM

## 2019-06-10 NOTE — PROGRESS NOTES
Last 5 Patient Entered Readings                                      Current 30 Day Average: 144/62     Recent Readings 6/6/2019 6/5/2019 6/3/2019 5/31/2019 5/30/2019    SBP (mmHg) 131 151 156 162 138    DBP (mmHg) 56 61 65 74 53    Pulse 85 72 73 76 78        Will not increase diltiazem at this time as Mrs. Quinones does have lower readings occasionally. Dr. Fernando changed simvastatin to pravastatin to prevent drug interaction with diltiazem.     Per 30 day average, 144/62 mmHg, patient's BP is not at goal.     Will continue to monitor regularly. Will follow up in 3-4 weeks, sooner if BP begins to trend upward or downward.    Asked patient to call or message with questions or concerns.     Current HTN regimen:  Hypertension Medications             carvedilol (COREG) 25 MG tablet TAKE 2 TABLETS (50 MG TOTAL) BY MOUTH 2 (TWO) TIMES DAILY.    chlorthalidone (HYGROTEN) 25 MG Tab Take 1 tablet (25 mg total) by mouth once daily.    cloNIDine 0.1 mg/24 hr td ptwk (CATAPRES) 0.1 mg/24 hr Place 1 patch onto the skin every 7 days.    diltiaZEM (CARDIZEM CD) 120 MG Cp24 Take 1 capsule (120 mg total) by mouth once daily.    hydrALAZINE (APRESOLINE) 100 MG tablet TAKE 1 TABLET (100 MG TOTAL) BY MOUTH 3 (THREE) TIMES DAILY.    irbesartan (AVAPRO) 300 MG tablet Take 1 tablet (300 mg total) by mouth every evening.    nitroGLYCERIN (NITROSTAT) 0.4 MG SL tablet Place 1 tablet under the tongue continuous prn.

## 2019-06-12 ENCOUNTER — TELEPHONE (OUTPATIENT)
Dept: ENDOCRINOLOGY | Facility: CLINIC | Age: 79
End: 2019-06-12

## 2019-06-12 NOTE — TELEPHONE ENCOUNTER
----- Message from Virginia Lafleur sent at 6/12/2019 10:16 AM CDT -----  Contact: Self [phone]  PT called to reschedule she and her 's ([id number]) ultrasound appointments. They will be on vacation on 8/22.

## 2019-06-13 ENCOUNTER — CLINICAL SUPPORT (OUTPATIENT)
Dept: OTOLARYNGOLOGY | Facility: CLINIC | Age: 79
End: 2019-06-13
Payer: MEDICARE

## 2019-06-13 DIAGNOSIS — Z46.1 ENCOUNTER FOR FITTING AND ADJUSTMENT OF HEARING AID OF BOTH EARS: Primary | ICD-10-CM

## 2019-06-13 PROCEDURE — 99499 NO LOS: ICD-10-PCS | Mod: S$GLB,,, | Performed by: AUDIOLOGIST-HEARING AID FITTER

## 2019-06-13 PROCEDURE — 99499 UNLISTED E&M SERVICE: CPT | Mod: S$GLB,,, | Performed by: AUDIOLOGIST-HEARING AID FITTER

## 2019-06-13 NOTE — PROGRESS NOTES
Susannah Carter, CCC-A  Audiologist - [facility] [address] [address]  [email or web address]  [phone]    Patient: Myesha Quinones   MRN: [id number]  : 1940  PELAYO: 2019      HEARING AID FOLLOW-UP      Patient is here today to  her Left hearing aid after it was recently sent in to [facility] for overall clean, check and repair.  She stated she is looking forward to being able to adjust her own hearing aids again, but is also glad that we sent them off for repair because now she has good, working hearing aids before warranty needs to be addressed next April (expires 2020).      Removed loaner hearing aid from Left ear and disconnected encased mold - put encased mold #[id number] for Left ear back on to Left hearing aid after repair #[id number].      Reviewed repair instructions, no need to re-pair as hearing aids are currently not connected to phone.  Also, firmware upgrade not needed as both devices were recently sent in for repair at [facility].      Connected to 2519 session in [facility] - verified manual controls set same as before repair, per patient preference and saved without any other changes.      Had questions regarding how to change out cerustop wax filter on Right hearing aid with silicone micromold - daughter was trying to do it through mold - explained not a good method, could tear silicone material.  Instead carefully remove  out of mold, change cerustop wax filter outside of mold and then re-insert  into the mold using the same orientation as when removed - completed in office today for patient as demonstration and she verbalized understanding of the process.  Advised her to be careful due to the age of the earmolds, as they are no longer covered under warranty.    Per patient, will wait for 6-month appointment for next hearing aid checkup.  If she needs assistance sooner, she will call for an appointment.   Gave reminder card for 12/12/19 @ 10:00 am - will put on schedule once opens.  Reminder that annual audiogram is due in February of 2020 and hearing aids go out of warranty in April of 2020.  +2 pk 13 4ct oba     _______________________________  Susannah Carter, CCC-A  Audiologist

## 2019-06-16 NOTE — PROGRESS NOTES
Subjective:      Patient ID: Myesha Quinones is a 79 y.o. female.    Chief Complaint: Follow-up (right arm lymphedema, diabetes, hypertension and hyperlipidemia)    HPI:  New Problem: chronic lymphedema, now with cellulitis, no fever mild with area on elbow with white head.  HPI   Diabetes Management Status    Statin: Taking  ACE/ARB: Taking    Screening or Prevention Patient's value Goal Complete/Controlled?   HgA1C Testing and Control   Lab Results   Component Value Date    HGBA1C 7.1 (H) 12/19/2018      Annually/Less than 8% Yes   Lipid profile : 12/19/2018 Annually Yes   LDL control Lab Results   Component Value Date    LDLCALC 43.4 (L) 12/19/2018    Annually/Less than 100 mg/dl  Yes   Nephropathy screening Lab Results   Component Value Date    LABMICR 335.0 01/21/2019     Lab Results   Component Value Date    PROTEINUA 1+ (A) 03/27/2019    Annually Yes   Blood pressure BP Readings from Last 1 Encounters:   06/17/19 (!) 142/68    Less than 140/90 No   Dilated retinal exam : 04/25/2019 Annually Yes   Foot exam   : 01/28/2019 Annually Yes       Patient Active Problem List   Diagnosis    History of nonmelanoma skin cancer    Nonischemic cardiomyopathy    LBBB (left bundle branch block)    Chronic systolic heart failure    Nontoxic multinodular goiter    Meningioma    Asthma, chronic    Biventricular ICD (implantable cardioverter-defibrillator) in place    Tremor    Coronary artery disease involving native coronary artery without angina pectoris - Non-obstructive 50% LAD    Essential hypertension    Hyperlipidemia    History of breast cancer    Adrenal cortical nodule: repeat CT 6/2016    Calcification of aorta    Diabetic polyneuropathy associated with type 2 diabetes mellitus    Osteoporosis    KHOA (obstructive sleep apnea)    Type 2 diabetes mellitus with stage 3 chronic kidney disease, with long-term current use of insulin    Chronic kidney disease, stage 3, mod decreased GFR    Iron  deficiency anemia    Chemotherapy-induced neuropathy    Hypomagnesemia    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, with long-term current use of insulin    Hypertensive retinopathy, bilateral    Multiple lung nodules    COPD (chronic obstructive pulmonary disease)    Mixed conductive and sensorineural hearing loss    ICD (implantable cardioverter-defibrillator) battery depletion    Type 2 DM with CKD stage 3 and hypertension    Cardiomyopathy, ischemic    Metabolic bone disease    Proteinuria     Past Medical History:   Diagnosis Date    Allergy     Asthma     Basal cell carcinoma     left forehead    Basal cell carcinoma     left nose    Basal cell carcinoma 05/20/2015    right nose    Basal cell carcinoma 12/22/2015    left lower post neck    Breast cancer     CAD (coronary artery disease)     Cardiomyopathy     Cardiomyopathy, ischemic     Cataract     CHF (congestive heart failure)     Chronic kidney disease, stage 3, mod decreased GFR 2/14/2017    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, with long-term current use of insulin 2/22/2018    COPD (chronic obstructive pulmonary disease)     COPD exacerbation 4/8/2018    Defibrillator discharge     Diabetes mellitus     Diabetes mellitus type II     Diabetes with neurologic complications     Goiter     MNG    HX: breast cancer     Hyperlipidemia     Hypertension     Iron deficiency anemia 5/16/2017    Left kidney mass     Meningioma     Osteoporosis, postmenopausal     Pacemaker     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    Skin cancer     s/p excision    Sleep apnea     CPAP    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/2015    mid forehead    Unspecified vitamin D deficiency     Ventricular tachycardia     Vitamin B12 deficiency     Vitamin D deficiency disease      Past  Surgical History:   Procedure Laterality Date    BASAL CELL CARCINOMA EXCISION      posterior neck and nose    BREAST BIOPSY      BREAST CYST EXCISION Left     BREAST SURGERY      CARDIAC DEFIBRILLATOR PLACEMENT      x 2    CATARACT EXTRACTION W/  INTRAOCULAR LENS IMPLANT Bilateral     CHOLECYSTECTOMY      Colonoscopy  N/A 8/27/2014    Performed by Leonidas Velasquez MD at [facility] (4TH FLR)    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    FRACTURE SURGERY      left elbow and wrist as a child    HYSTERECTOMY      MASTECTOMY Right     REPLACEMENT, ICD GENERATOR N/A 12/17/2018    Performed by Jan Mckeon MD at [facility]    Revision, Skin Pocket, For Cardioverter-Defibrillator  12/17/2018    Performed by Jan Mckeon MD at [facility]    SQUAMOUS CELL CARCINOMA EXCISION      remved from forehead    TONSILLECTOMY       Family History   Problem Relation Age of Onset    Diabetes Father     Heart disease Father     Diabetes Sister     Heart disease Sister     Diabetes Brother     Heart disease Brother     Hypertension Brother     Diabetes Brother     Heart disease Brother     Hypertension Brother     Diabetes Brother     Heart disease Brother     Cancer Brother         colon    Diabetes Brother     Cancer Son         skin    Diabetes Son         prediabetes    Diabetes Daughter         prediabetes    Cancer Daughter         melanoma    Obesity Daughter     Melanoma Daughter     Diabetes Son     Asthma Mother     Hypertension Mother     Stroke Mother     No Known Problems Maternal Grandmother     No Known Problems Maternal Grandfather     No Known Problems Paternal Grandmother     No Known Problems Paternal Grandfather     Amblyopia Neg Hx     Blindness Neg Hx     Cataracts Neg Hx     Glaucoma Neg Hx     Macular degeneration Neg Hx     Retinal detachment Neg Hx     Strabismus Neg Hx     Thyroid disease Neg Hx      Review of Systems   Constitutional: Negative for  chills, fever and unexpected weight change.   HENT: Negative for trouble swallowing.    Respiratory: Positive for wheezing. Negative for cough and shortness of breath.    Cardiovascular: Negative for chest pain and palpitations.   Gastrointestinal: Negative for abdominal distention, abdominal pain, blood in stool and vomiting.   Musculoskeletal: Negative for back pain.   Skin:        Right arm chronic lymphedema, now with mild cellulitis     Objective:     Vitals:    06/17/19 0745   BP: (!) 142/68   Pulse: 78   Weight: 72.3 kg (159 lb 6.3 oz)     Body mass index is 28.24 kg/m².  Physical Exam   Constitutional: She is oriented to person, place, and time. She appears well-developed and well-nourished. No distress.   Neck: Carotid bruit is not present. No thyromegaly present.   Cardiovascular: Normal rate, regular rhythm and normal heart sounds. PMI is not displaced.   Pulmonary/Chest: Effort normal. No respiratory distress. She has wheezes.   Abdominal: Soft. Bowel sounds are normal. She exhibits no distension. There is no tenderness.   Musculoskeletal: She exhibits no edema.   Neurological: She is alert and oriented to person, place, and time.   Skin:   Mild cellulitis     Assessment:     1. Cellulitis of right upper extremity    2. History of breast cancer    3. Mild intermittent chronic asthma without complication    4. Lymphedema    5. Type 2 diabetes mellitus with stage 3 chronic kidney disease, with long-term current use of insulin    6. Pure hypercholesterolemia    7. Essential hypertension      Plan:   Myesha was seen today for follow-up.    Diagnoses and all orders for this visit:    Cellulitis of right upper extremity  Comments:  Will start the patient on Augmentin 875 mg bid for 7 days    History of breast cancer  Comments:  Discharged from Oncology, with continued yearly left mammogram, right mastetomy      Mild intermittent chronic asthma without complication  Comments:  Will need to establish with  new Pulmonary physician since Dr. Soler retired  Orders:  -     Ambulatory consult to Pulmonology    Lymphedema  Comments:  Chronic but  now with cellulitis    Type 2 diabetes mellitus with stage 3 chronic kidney disease, with long-term current use of insulin  Comments:  Check A1C, Concerns related to medications and renal function  Orders:  -     Hemoglobin A1c; Future    Pure hypercholesterolemia  Comments:  Changed to pravastatin 40 mg and will check lipid  Orders:  -     Lipid panel; Future    Essential hypertension  Comments:  Follow up Cardiology requested by Dr. Fernando, patient also has an appt with Dr. Castillo  Orders:  -     Ambulatory consult to Cardiology    Other orders  -     amoxicillin-clavulanate 500-125mg (AUGMENTIN) 500-125 mg Tab; Take 1 tablet (500 mg total) by mouth 2 (two) times daily. Reduced for renal function        Problem List Items Addressed This Visit     Asthma, chronic    Relevant Orders    Ambulatory consult to Pulmonology    Essential hypertension    Relevant Orders    Ambulatory consult to Cardiology    Hyperlipidemia    Relevant Orders    Lipid panel    History of breast cancer    Overview     This is a 78-year-old female, who returns for her annual followup with a history of breast carcinoma. She is a previous patient of Dr. Amadeo Thomas. She was diagnosed with a right breast carcinoma in 1998 with a 2.5 cm infiltrating, poorly differentiated ductal carcinoma on mastectomy specimen, histologic grade III, nuclear grade II, and mitotic index 1 with negative margins and 2 out of 17 lymph nodes showing metastatic carcinoma.   The patient was treated with 4 cycles of Adriamycin and Cytoxan, followed by 4 cycles of Taxotere. She was then placed on tamoxifen for 5 years based on ER/OK status. She was then switched to Femara, and she completed 5 years in 2009.     Dr. Jaime 2019 now that she is 10 years post treatment, 20 years post diagnosis         Type 2 diabetes mellitus with stage 3  chronic kidney disease, with long-term current use of insulin    Relevant Orders    Hemoglobin A1c      Other Visit Diagnoses     Cellulitis of right upper extremity    -  Primary    Will start the patient on Augmentin 875 mg bid for 7 days    Lymphedema        Chronic but  now with cellulitis        Orders Placed This Encounter   Procedures    Hemoglobin A1c     Standing Status:   Future     Standing Expiration Date:   8/16/2019    Lipid panel     Standing Status:   Future     Standing Expiration Date:   8/16/2019    Ambulatory consult to Pulmonology     Referral Priority:   Routine     Referral Type:   Consultation     Referral Reason:   Specialty Services Required     Requested Specialty:   Pulmonary Disease     Number of Visits Requested:   1    Ambulatory consult to Cardiology     Referral Priority:   Routine     Referral Type:   Consultation     Referral Reason:   Specialty Services Required     Referred to Provider:   Jose Luis Fernando MD     Requested Specialty:   Cardiology     Number of Visits Requested:   1     Follow up in about 3 months (around 9/17/2019) for Follow up.     Medication List           Accurate as of 6/17/19  8:25 AM. If you have any questions, ask your nurse or doctor.               START taking these medications    amoxicillin-clavulanate 500-125mg 500-125 mg Tab  Commonly known as:  AUGMENTIN  Take 1 tablet (500 mg total) by mouth 2 (two) times daily. Reduced for renal function  Started by:  Emelina Gaston MD        CHANGE how you take these medications    blood sugar diagnostic Strp  Commonly known as:  ACCU-CHEK HECTOR  Uses Accu-Check Hector meter to test BG 4x/day  What changed:  additional instructions     metFORMIN 500 MG tablet  Commonly known as:  GLUCOPHAGE  Take 2 tablets (1,000 mg total) by mouth 2 (two) times daily with meals.  What changed:    · how much to take  · additional instructions        CONTINUE taking these medications    ACCU-CHEK HECTOR CONTROL SOLN Soln  Generic  drug:  blood glucose control high,low     ACCU-CHEK HECTOR PLUS METER [facility]  Generic drug:  blood-glucose meter     albuterol 90 mcg/actuation inhaler  Commonly known as:  PROVENTIL/VENTOLIN HFA  Inhale 2 puffs into the lungs every 6 (six) hours as needed for Wheezing. Rescue     albuterol-ipratropium 2.5 mg-0.5 mg/3 mL nebulizer solution  Commonly known as:  DUO-NEB  TAKE 1 VIAL BY NEBULIZATION EVERY 4 (FOUR) HOURS. RESCUE     azelastine 137 mcg (0.1 %) nasal spray  Commonly known as:  ASTELIN  1 spray (137 mcg total) by Nasal route 2 (two) times daily. For severe allergy     B-12 DOTS ORAL     benzonatate 100 MG capsule  Commonly known as:  TESSALON PERLES  Take 2 capsules (200 mg total) by mouth 3 (three) times daily as needed for Cough.     carvedilol 25 MG tablet  Commonly known as:  COREG  TAKE 2 TABLETS (50 MG TOTAL) BY MOUTH 2 (TWO) TIMES DAILY.     chlorthalidone 25 MG Tab  Commonly known as:  HYGROTEN  Take 1 tablet (25 mg total) by mouth once daily.     cloNIDine 0.1 mg/24 hr td ptwk 0.1 mg/24 hr  Commonly known as:  CATAPRES  Place 1 patch onto the skin every 7 days.     diltiaZEM 120 MG Cp24  Commonly known as:  CARDIZEM CD  Take 1 capsule (120 mg total) by mouth once daily.     ENTERIC COATED ASPIRIN 81 MG EC tablet  Generic drug:  aspirin     FISH  mg Cap  Generic drug:  omega-3 fatty acids     fluticasone propionate 50 mcg/actuation nasal spray  Commonly known as:  FLONASE  2 sprays (100 mcg total) by Each Nare route once daily.     fluticasone-salmeterol 250-50 mcg/dose 250-50 mcg/dose diskus inhaler  Commonly known as:  ADVAIR  Inhale 1 puff into the lungs 2 (two) times daily. This is a change from one month     hydrALAZINE 100 MG tablet  Commonly known as:  APRESOLINE  TAKE 1 TABLET (100 MG TOTAL) BY MOUTH 3 (THREE) TIMES DAILY.     insulin glargine 100 units/mL (3mL) SubQ pen  Commonly known as:  LANTUS SOLOSTAR U-100 INSULIN  Inject 30 Units into the skin every evening.     irbesartan 300  "MG tablet  Commonly known as:  AVAPRO  Take 1 tablet (300 mg total) by mouth every evening.     * lancets Misc  Commonly known as:  ACCU-CHEK SOFTCLIX LANCETS  Uses Accu-Chek Angelica meter to test BG 4x/day     * lancets 30 gauge Misc     lancing device Misc     levocetirizine 5 MG tablet  Commonly known as:  XYZAL  Take 1 tablet (5 mg total) by mouth every evening.     linaGLIPtin 5 mg Tab tablet  Commonly known as:  TRADJENTA  Take 1 tablet (5 mg total) by mouth once daily.     magnesium oxide 400 mg (241.3 mg magnesium) tablet  Commonly known as:  MAG-OX  Take 1 tablet (400 mg total) by mouth once daily.     nitroGLYCERIN 0.4 MG SL tablet  Commonly known as:  NITROSTAT     pen needle, diabetic 31 gauge x 3/16" Ndle  Commonly known as:  BD ULTRA-FINE MINI PEN NEEDLE  USE WITH LANTUS AT BEDTIME     pravastatin 40 MG tablet  Commonly known as:  PRAVACHOL  Take 1 tablet (40 mg total) by mouth once daily.     VITAMIN D3 2,000 unit Tab  Generic drug:  cholecalciferol (vitamin D3)         * This list has 2 medication(s) that are the same as other medications prescribed for you. Read the directions carefully, and ask your doctor or other care provider to review them with you.               Where to Get Your Medications      These medications were sent to [facility]/pharmacy #[id number] - [place] - [address] AT [facility]  [address] [address]    Phone:  [phone]   · amoxicillin-clavulanate 500-125mg 500-125 mg Tab         "

## 2019-06-17 ENCOUNTER — LAB VISIT (OUTPATIENT)
Dept: LAB | Facility: HOSPITAL | Age: 79
End: 2019-06-17
Attending: INTERNAL MEDICINE
Payer: MEDICARE

## 2019-06-17 ENCOUNTER — OFFICE VISIT (OUTPATIENT)
Dept: INTERNAL MEDICINE | Facility: CLINIC | Age: 79
End: 2019-06-17
Payer: MEDICARE

## 2019-06-17 VITALS
WEIGHT: 159.38 LBS | BODY MASS INDEX: 28.24 KG/M2 | SYSTOLIC BLOOD PRESSURE: 142 MMHG | DIASTOLIC BLOOD PRESSURE: 68 MMHG | HEART RATE: 78 BPM

## 2019-06-17 DIAGNOSIS — N18.30 TYPE 2 DIABETES MELLITUS WITH STAGE 3 CHRONIC KIDNEY DISEASE, WITH LONG-TERM CURRENT USE OF INSULIN: ICD-10-CM

## 2019-06-17 DIAGNOSIS — Z79.4 TYPE 2 DIABETES MELLITUS WITH STAGE 3 CHRONIC KIDNEY DISEASE, WITH LONG-TERM CURRENT USE OF INSULIN: ICD-10-CM

## 2019-06-17 DIAGNOSIS — E78.00 PURE HYPERCHOLESTEROLEMIA: ICD-10-CM

## 2019-06-17 DIAGNOSIS — E11.22 TYPE 2 DIABETES MELLITUS WITH STAGE 3 CHRONIC KIDNEY DISEASE, WITH LONG-TERM CURRENT USE OF INSULIN: ICD-10-CM

## 2019-06-17 DIAGNOSIS — N18.30 STAGE 3 CHRONIC KIDNEY DISEASE: ICD-10-CM

## 2019-06-17 DIAGNOSIS — J45.20 MILD INTERMITTENT CHRONIC ASTHMA WITHOUT COMPLICATION: ICD-10-CM

## 2019-06-17 DIAGNOSIS — L03.113 CELLULITIS OF RIGHT UPPER EXTREMITY: Primary | ICD-10-CM

## 2019-06-17 DIAGNOSIS — I10 ESSENTIAL HYPERTENSION: ICD-10-CM

## 2019-06-17 DIAGNOSIS — I89.0 LYMPHEDEMA: ICD-10-CM

## 2019-06-17 DIAGNOSIS — Z85.3 HISTORY OF BREAST CANCER: ICD-10-CM

## 2019-06-17 LAB
ANION GAP SERPL CALC-SCNC: 10 MMOL/L (ref 8–16)
BASOPHILS # BLD AUTO: 0.05 K/UL (ref 0–0.2)
BASOPHILS NFR BLD: 0.5 % (ref 0–1.9)
BUN SERPL-MCNC: 28 MG/DL (ref 8–23)
CALCIUM SERPL-MCNC: 9.9 MG/DL (ref 8.7–10.5)
CHLORIDE SERPL-SCNC: 103 MMOL/L (ref 95–110)
CHOLEST SERPL-MCNC: 150 MG/DL (ref 120–199)
CHOLEST/HDLC SERPL: 3.1 {RATIO} (ref 2–5)
CO2 SERPL-SCNC: 23 MMOL/L (ref 23–29)
CREAT SERPL-MCNC: 1.2 MG/DL (ref 0.5–1.4)
DIFFERENTIAL METHOD: ABNORMAL
EOSINOPHIL # BLD AUTO: 0.5 K/UL (ref 0–0.5)
EOSINOPHIL NFR BLD: 5.1 % (ref 0–8)
ERYTHROCYTE [DISTWIDTH] IN BLOOD BY AUTOMATED COUNT: 12.7 % (ref 11.5–14.5)
EST. GFR  (AFRICAN AMERICAN): 49 ML/MIN/1.73 M^2
EST. GFR  (NON AFRICAN AMERICAN): 43 ML/MIN/1.73 M^2
ESTIMATED AVG GLUCOSE: 209 MG/DL (ref 68–131)
GLUCOSE SERPL-MCNC: 179 MG/DL (ref 70–110)
HBA1C MFR BLD HPLC: 8.9 % (ref 4–5.6)
HCT VFR BLD AUTO: 36.4 % (ref 37–48.5)
HDLC SERPL-MCNC: 49 MG/DL (ref 40–75)
HDLC SERPL: 32.7 % (ref 20–50)
HGB BLD-MCNC: 11.7 G/DL (ref 12–16)
LDLC SERPL CALC-MCNC: 70.4 MG/DL (ref 63–159)
LYMPHOCYTES # BLD AUTO: 1.9 K/UL (ref 1–4.8)
LYMPHOCYTES NFR BLD: 18.6 % (ref 18–48)
MCH RBC QN AUTO: 30.1 PG (ref 27–31)
MCHC RBC AUTO-ENTMCNC: 32.1 G/DL (ref 32–36)
MCV RBC AUTO: 94 FL (ref 82–98)
MONOCYTES # BLD AUTO: 0.7 K/UL (ref 0.3–1)
MONOCYTES NFR BLD: 7 % (ref 4–15)
NEUTROPHILS # BLD AUTO: 7 K/UL (ref 1.8–7.7)
NEUTROPHILS NFR BLD: 67.6 % (ref 38–73)
NONHDLC SERPL-MCNC: 101 MG/DL
PLATELET # BLD AUTO: 256 K/UL (ref 150–350)
PMV BLD AUTO: 10.3 FL (ref 9.2–12.9)
POTASSIUM SERPL-SCNC: 4.1 MMOL/L (ref 3.5–5.1)
RBC # BLD AUTO: 3.89 M/UL (ref 4–5.4)
SODIUM SERPL-SCNC: 136 MMOL/L (ref 136–145)
TRIGL SERPL-MCNC: 153 MG/DL (ref 30–150)
WBC # BLD AUTO: 10.42 K/UL (ref 3.9–12.7)

## 2019-06-17 PROCEDURE — 1101F PT FALLS ASSESS-DOCD LE1/YR: CPT | Mod: HCNC,CPTII,S$GLB, | Performed by: INTERNAL MEDICINE

## 2019-06-17 PROCEDURE — 99215 PR OFFICE/OUTPT VISIT, EST, LEVL V, 40-54 MIN: ICD-10-PCS | Mod: HCNC,S$GLB,, | Performed by: INTERNAL MEDICINE

## 2019-06-17 PROCEDURE — 80061 LIPID PANEL: CPT | Mod: HCNC

## 2019-06-17 PROCEDURE — 80048 BASIC METABOLIC PNL TOTAL CA: CPT | Mod: HCNC

## 2019-06-17 PROCEDURE — 83036 HEMOGLOBIN GLYCOSYLATED A1C: CPT | Mod: HCNC

## 2019-06-17 PROCEDURE — 99215 OFFICE O/P EST HI 40 MIN: CPT | Mod: HCNC,S$GLB,, | Performed by: INTERNAL MEDICINE

## 2019-06-17 PROCEDURE — 3078F DIAST BP <80 MM HG: CPT | Mod: HCNC,CPTII,S$GLB, | Performed by: INTERNAL MEDICINE

## 2019-06-17 PROCEDURE — 99999 PR PBB SHADOW E&M-EST. PATIENT-LVL III: CPT | Mod: PBBFAC,HCNC,, | Performed by: INTERNAL MEDICINE

## 2019-06-17 PROCEDURE — 99999 PR PBB SHADOW E&M-EST. PATIENT-LVL III: ICD-10-PCS | Mod: PBBFAC,HCNC,, | Performed by: INTERNAL MEDICINE

## 2019-06-17 PROCEDURE — 3077F SYST BP >= 140 MM HG: CPT | Mod: HCNC,CPTII,S$GLB, | Performed by: INTERNAL MEDICINE

## 2019-06-17 PROCEDURE — 36415 COLL VENOUS BLD VENIPUNCTURE: CPT | Mod: HCNC

## 2019-06-17 PROCEDURE — 99499 UNLISTED E&M SERVICE: CPT | Mod: HCNC,S$GLB,, | Performed by: INTERNAL MEDICINE

## 2019-06-17 PROCEDURE — 85025 COMPLETE CBC W/AUTO DIFF WBC: CPT | Mod: HCNC

## 2019-06-17 PROCEDURE — 1101F PR PT FALLS ASSESS DOC 0-1 FALLS W/OUT INJ PAST YR: ICD-10-PCS | Mod: HCNC,CPTII,S$GLB, | Performed by: INTERNAL MEDICINE

## 2019-06-17 PROCEDURE — 3077F PR MOST RECENT SYSTOLIC BLOOD PRESSURE >= 140 MM HG: ICD-10-PCS | Mod: HCNC,CPTII,S$GLB, | Performed by: INTERNAL MEDICINE

## 2019-06-17 PROCEDURE — 3078F PR MOST RECENT DIASTOLIC BLOOD PRESSURE < 80 MM HG: ICD-10-PCS | Mod: HCNC,CPTII,S$GLB, | Performed by: INTERNAL MEDICINE

## 2019-06-17 PROCEDURE — 99499 RISK ADDL DX/OHS AUDIT: ICD-10-PCS | Mod: HCNC,S$GLB,, | Performed by: INTERNAL MEDICINE

## 2019-06-17 RX ORDER — AMOXICILLIN AND CLAVULANATE POTASSIUM 500; 125 MG/1; MG/1
1 TABLET, FILM COATED ORAL 2 TIMES DAILY
Qty: 14 TABLET | Refills: 0 | Status: SHIPPED | OUTPATIENT
Start: 2019-06-17 | End: 2019-09-17

## 2019-06-20 ENCOUNTER — TELEPHONE (OUTPATIENT)
Dept: INTERNAL MEDICINE | Facility: CLINIC | Age: 79
End: 2019-06-20

## 2019-06-20 ENCOUNTER — TELEPHONE (OUTPATIENT)
Dept: PULMONOLOGY | Facility: CLINIC | Age: 79
End: 2019-06-20

## 2019-06-20 DIAGNOSIS — J45.909 ASTHMA, UNSPECIFIED ASTHMA SEVERITY, UNSPECIFIED WHETHER COMPLICATED, UNSPECIFIED WHETHER PERSISTENT: Primary | ICD-10-CM

## 2019-06-20 NOTE — TELEPHONE ENCOUNTER
Called pt informed of results pt verbally stated understanding of results     Pt stated no changes made to diabetes medication

## 2019-06-20 NOTE — TELEPHONE ENCOUNTER
----- Message from Emelina Gaston MD sent at 6/19/2019  8:34 PM CDT -----  Please tell the patient that her  A1C has gone up to 8.9. Would you ask her what changes were made to her diabetes medications that may have caused this and let me know.

## 2019-06-20 NOTE — TELEPHONE ENCOUNTER
Regarding the patients glucose: Endocrine had decreased her insulin from 46 units to 30 units and I had decreased her metformin for renal reasons to 1000 a day.    Increase insulin to 34 units .  Call next week

## 2019-06-24 ENCOUNTER — CLINICAL SUPPORT (OUTPATIENT)
Dept: CARDIOLOGY | Facility: HOSPITAL | Age: 79
End: 2019-06-24
Attending: INTERNAL MEDICINE
Payer: MEDICARE

## 2019-06-24 ENCOUNTER — OFFICE VISIT (OUTPATIENT)
Dept: CARDIOLOGY | Facility: CLINIC | Age: 79
End: 2019-06-24
Payer: MEDICARE

## 2019-06-24 VITALS
DIASTOLIC BLOOD PRESSURE: 72 MMHG | SYSTOLIC BLOOD PRESSURE: 174 MMHG | HEIGHT: 63 IN | BODY MASS INDEX: 28.16 KG/M2 | WEIGHT: 158.94 LBS | HEART RATE: 88 BPM

## 2019-06-24 DIAGNOSIS — I10 ESSENTIAL HYPERTENSION: ICD-10-CM

## 2019-06-24 DIAGNOSIS — Z95.810 BIVENTRICULAR ICD (IMPLANTABLE CARDIOVERTER-DEFIBRILLATOR) IN PLACE: ICD-10-CM

## 2019-06-24 DIAGNOSIS — N18.30 CHRONIC KIDNEY DISEASE, STAGE 3, MOD DECREASED GFR: ICD-10-CM

## 2019-06-24 DIAGNOSIS — Z95.810 AICD (AUTOMATIC CARDIOVERTER/DEFIBRILLATOR) PRESENT: ICD-10-CM

## 2019-06-24 DIAGNOSIS — I42.8 NONISCHEMIC CARDIOMYOPATHY: Primary | ICD-10-CM

## 2019-06-24 DIAGNOSIS — G47.33 OSA (OBSTRUCTIVE SLEEP APNEA): ICD-10-CM

## 2019-06-24 DIAGNOSIS — E78.00 PURE HYPERCHOLESTEROLEMIA: ICD-10-CM

## 2019-06-24 PROCEDURE — 1101F PT FALLS ASSESS-DOCD LE1/YR: CPT | Mod: HCNC,CPTII,S$GLB, | Performed by: INTERNAL MEDICINE

## 2019-06-24 PROCEDURE — 3077F SYST BP >= 140 MM HG: CPT | Mod: HCNC,CPTII,S$GLB, | Performed by: INTERNAL MEDICINE

## 2019-06-24 PROCEDURE — 3078F DIAST BP <80 MM HG: CPT | Mod: HCNC,CPTII,S$GLB, | Performed by: INTERNAL MEDICINE

## 2019-06-24 PROCEDURE — 3077F PR MOST RECENT SYSTOLIC BLOOD PRESSURE >= 140 MM HG: ICD-10-PCS | Mod: HCNC,CPTII,S$GLB, | Performed by: INTERNAL MEDICINE

## 2019-06-24 PROCEDURE — 99999 PR PBB SHADOW E&M-EST. PATIENT-LVL V: CPT | Mod: PBBFAC,HCNC,, | Performed by: INTERNAL MEDICINE

## 2019-06-24 PROCEDURE — 3078F PR MOST RECENT DIASTOLIC BLOOD PRESSURE < 80 MM HG: ICD-10-PCS | Mod: HCNC,CPTII,S$GLB, | Performed by: INTERNAL MEDICINE

## 2019-06-24 PROCEDURE — 99214 PR OFFICE/OUTPT VISIT, EST, LEVL IV, 30-39 MIN: ICD-10-PCS | Mod: HCNC,S$GLB,, | Performed by: INTERNAL MEDICINE

## 2019-06-24 PROCEDURE — 99214 OFFICE O/P EST MOD 30 MIN: CPT | Mod: HCNC,S$GLB,, | Performed by: INTERNAL MEDICINE

## 2019-06-24 PROCEDURE — 93296 REM INTERROG EVL PM/IDS: CPT | Mod: HCNC

## 2019-06-24 PROCEDURE — 99999 PR PBB SHADOW E&M-EST. PATIENT-LVL V: ICD-10-PCS | Mod: PBBFAC,HCNC,, | Performed by: INTERNAL MEDICINE

## 2019-06-24 PROCEDURE — 1101F PR PT FALLS ASSESS DOC 0-1 FALLS W/OUT INJ PAST YR: ICD-10-PCS | Mod: HCNC,CPTII,S$GLB, | Performed by: INTERNAL MEDICINE

## 2019-06-24 NOTE — PROGRESS NOTES
Subjective:   Patient ID:  Myesha Quinones is a 79 y.o. female who presents for follow-up of Essential hypertension      HPI:   Myesha Quinones presents for follow up of hypertension. Her Bp has been variable in the A:M ranging from 130s-160/. Admits she may have taken in more sodium. Has been taking once a day medications all in the A:M . Myesha Quinones has a non-ischemic cardiomyopathy having had CRT with last EF 50-55%. Myesha Quinones denies chest pain, shortness of breath, palpitations, presyncope , or syncope but is not exercising. Myesha Quinones has dyslipidemia and recently switched to pravastatin to prevent drug interaction...   Review of Systems   Constitution: Negative for malaise/fatigue, weight gain and weight loss.   Eyes: Negative for blurred vision.   Cardiovascular: Negative for chest pain, claudication, cyanosis, dyspnea on exertion, irregular heartbeat, leg swelling, near-syncope, orthopnea, palpitations, paroxysmal nocturnal dyspnea and syncope.   Respiratory: Negative for cough, shortness of breath and wheezing.         KHOA on CPAP   Musculoskeletal: Negative for falls and myalgias.   Gastrointestinal: Negative for abdominal pain, heartburn, nausea and vomiting.   Genitourinary: Negative for nocturia.   Neurological: Negative for brief paralysis, dizziness, focal weakness, headaches, numbness, paresthesias and weakness.   Psychiatric/Behavioral: Negative for altered mental status.       Current Outpatient Medications   Medication Sig    ACCU-CHEK HECTOR CONTROL SOLN Soln     ACCU-CHEK HECTOR PLUS METER Misc     albuterol 90 mcg/actuation inhaler Inhale 2 puffs into the lungs every 6 (six) hours as needed for Wheezing. Rescue    albuterol-ipratropium (DUO-NEB) 2.5 mg-0.5 mg/3 mL nebulizer solution TAKE 1 VIAL BY NEBULIZATION EVERY 4 (FOUR) HOURS. RESCUE    aspirin (ENTERIC COATED ASPIRIN) 81 MG EC tablet Take 1 tablet by mouth once daily.     benzonatate (TESSALON  PERLES) 100 MG capsule Take 2 capsules (200 mg total) by mouth 3 (three) times daily as needed for Cough.    blood sugar diagnostic (ACCU-CHEK HECTOR) Strp Uses Accu-Check Hector meter to test BG 4x/day (Patient taking differently: Uses Accu-Check Hector meter to test BG 4x/day checking 2x day)    carvedilol (COREG) 25 MG tablet TAKE 2 TABLETS (50 MG TOTAL) BY MOUTH 2 (TWO) TIMES DAILY.    chlorthalidone (HYGROTEN) 25 MG Tab Take 1 tablet (25 mg total) by mouth once daily.    cholecalciferol, vitamin D3, (VITAMIN D3) 2,000 unit Tab Take by mouth once daily.    cloNIDine 0.1 mg/24 hr td ptwk (CATAPRES) 0.1 mg/24 hr Place 1 patch onto the skin every 7 days.    CYANOCOBALAMIN, VITAMIN B-12, (B-12 DOTS ORAL) Take 1 tablet by mouth once daily.    diltiaZEM (CARDIZEM CD) 120 MG Cp24 Take 1 capsule (120 mg total) by mouth once daily.    fluticasone-salmeterol 250-50 mcg/dose (ADVAIR) 250-50 mcg/dose diskus inhaler Inhale 1 puff into the lungs 2 (two) times daily. This is a change from one month    hydrALAZINE (APRESOLINE) 100 MG tablet TAKE 1 TABLET (100 MG TOTAL) BY MOUTH 3 (THREE) TIMES DAILY.    insulin (LANTUS SOLOSTAR U-100 INSULIN) glargine 100 units/mL (3mL) SubQ pen Inject 30 Units into the skin every evening. (Patient taking differently: Inject 34 Units into the skin every evening. )    irbesartan (AVAPRO) 300 MG tablet Take 1 tablet (300 mg total) by mouth every evening.    lancets (ACCU-CHEK SOFTCLIX LANCETS) Misc Uses Accu-Chek Hector meter to test BG 4x/day    lancets 30 gauge Misc     lancing device Misc     levocetirizine (XYZAL) 5 MG tablet Take 1 tablet (5 mg total) by mouth every evening.    linagliptin (TRADJENTA) 5 mg Tab tablet Take 1 tablet (5 mg total) by mouth once daily.    magnesium oxide (MAG-OX) 400 mg tablet Take 1 tablet (400 mg total) by mouth once daily.    metFORMIN (GLUCOPHAGE) 500 MG tablet Take 2 tablets (1,000 mg total) by mouth 2 (two) times daily with meals. (Patient  "taking differently: Take 500 mg by mouth 2 (two) times daily with meals. Reduced because of GFR)    nitroGLYCERIN (NITROSTAT) 0.4 MG SL tablet Place 1 tablet under the tongue continuous prn.      omega-3 fatty acids (FISH OIL) 500 mg Cap Take 1 capsule by mouth Twice daily.    pen needle, diabetic (BD INSULIN PEN NEEDLE UF MINI) 31 gauge x 3/16" Ndle USE WITH LANTUS AT BEDTIME    pravastatin (PRAVACHOL) 40 MG tablet Take 1 tablet (40 mg total) by mouth once daily.    amoxicillin-clavulanate 500-125mg (AUGMENTIN) 500-125 mg Tab Take 1 tablet (500 mg total) by mouth 2 (two) times daily. Reduced for renal function    azelastine (ASTELIN) 137 mcg (0.1 %) nasal spray 1 spray (137 mcg total) by Nasal route 2 (two) times daily. For severe allergy    fluticasone (FLONASE) 50 mcg/actuation nasal spray 2 sprays (100 mcg total) by Each Nare route once daily.     Current Facility-Administered Medications   Medication    acetaminophen tablet 650 mg     Facility-Administered Medications Ordered in Other Visits   Medication    0.9%  NaCl infusion     Objective:   Physical Exam   Constitutional: She is oriented to person, place, and time. She appears well-developed. No distress.   BP (!) 174/72 (BP Location: Left arm, Patient Position: Sitting, BP Method: X-Large (Automatic))   Pulse 88   Ht 5' 3" (1.6 m)   Wt 72.1 kg (158 lb 15.2 oz)   BMI 28.16 kg/m²    HENT:   Head: Normocephalic.   Eyes: Pupils are equal, round, and reactive to light. Conjunctivae are normal. No scleral icterus.   Neck: Neck supple. No JVD present. No thyromegaly present.   Cardiovascular: Normal rate, regular rhythm, normal heart sounds and intact distal pulses. PMI is not displaced. Exam reveals no gallop and no friction rub.   No murmur heard.  1+ bilateral pedal edema   Pulmonary/Chest: Effort normal and breath sounds normal. No respiratory distress. She has no wheezes. She has no rales.   Right mastectomy      Abdominal: Soft. She exhibits no " distension. There is no splenomegaly or hepatomegaly. There is no tenderness.   Musculoskeletal: She exhibits no edema or tenderness.   Gait normal  Right arm lymphedema    Neurological: She is alert and oriented to person, place, and time.   Skin: Skin is warm and dry. She is not diaphoretic.   Psychiatric: She has a normal mood and affect. Her behavior is normal.       Lab Results   Component Value Date     06/17/2019    K 4.1 06/17/2019     06/17/2019    CO2 23 06/17/2019    BUN 28 (H) 06/17/2019    CREATININE 1.2 06/17/2019     (H) 06/17/2019    HGBA1C 8.9 (H) 06/17/2019    MG 1.8 04/08/2018    AST 16 04/03/2019    ALT 17 04/03/2019    ALBUMIN 3.5 04/03/2019    PROT 7.0 04/03/2019    BILITOT 0.4 04/03/2019    WBC 10.42 06/17/2019    HGB 11.7 (L) 06/17/2019    HCT 36.4 (L) 06/17/2019    MCV 94 06/17/2019     06/17/2019    TSH 1.127 12/19/2018    CHOL 150 06/17/2019    HDL 49 06/17/2019    LDLCALC 70.4 06/17/2019    TRIG 153 (H) 06/17/2019       Assessment:     1. Nonischemic cardiomyopathy : Ef 50-55% post CRT   2. Essential hypertension : Still labile   3. Biventricular ICD (implantable cardioverter-defibrillator) in place    4. Chronic kidney disease, stage 3, mod decreased GFR ; Improved   5. Pure hypercholesterolemia :  on moderate intensity statin therapy.   6. KHOA (obstructive sleep apnea)        Plan:     Myesha was seen today for essential hypertension.    Diagnoses and all orders for this visit:    Nonischemic cardiomyopathy    Essential hypertension  Instructed to take Irbesartan at night  Biventricular ICD (implantable cardioverter-defibrillator) in place    Chronic kidney disease, stage 3, mod decreased GFR    Pure hypercholesterolemia  -     Lipid panel; Future; Expected date: 08/23/2019  Continue current regimen  Graded exercise for 30 minutes a day at least 5 days a week suggested.  KHOA (obstructive sleep apnea)

## 2019-06-24 NOTE — TELEPHONE ENCOUNTER
Please call the patient and ask her how her glucoses are on the 34 units of insulin. Please return the message to me. GML

## 2019-06-24 NOTE — LETTER
June 24, 2019      Emelina Gaston MD  [address]  [facility] [address]           [facility]  [address]  [facility] [address]  Phone: [phone]          Patient: Myesha Quinones   MR Number: [id number]   YOB: 1940   Date of Visit: 6/24/2019       Dear Dr. Emelina Gaston:    Thank you for referring Myesha Quinones to me for evaluation. Attached you will find relevant portions of my assessment and plan of care.    If you have questions, please do not hesitate to call me. I look forward to following Myesha Quinones along with you.    Sincerely,    Jose Luis Fernando MD    Enclosure  CC:  No Recipients    If you would like to receive this communication electronically, please contact [email or web address] or [phone] to request more information on [employer] access.    For providers and/or their staff who would like to refer a patient to [facility], please contact us through our one-stop-shop provider referral line, [facility], at [phone].    If you feel you have received this communication in error or would no longer like to receive these types of communications, please e-mail [email or web address]

## 2019-06-24 NOTE — PROGRESS NOTES
Last 5 Patient Entered Readings                                      Current 30 Day Average: 140/62     Recent Readings 6/20/2019 6/19/2019 6/16/2019 6/11/2019 6/10/2019    SBP (mmHg) 127 120 122 148 146    DBP (mmHg) 59 56 62 65 67    Pulse 79 80 74 77 75          No response via [employer] message. Patient sees cardiology today (6/24/19).   Will call patient tomorrow for HC f/u.

## 2019-06-25 NOTE — PROGRESS NOTES
"Last 5 Patient Entered Readings                                      Current 30 Day Average: 140/62     Recent Readings 6/20/2019 6/19/2019 6/16/2019 6/11/2019 6/10/2019    SBP (mmHg) 127 120 122 148 146    DBP (mmHg) 59 56 62 65 67    Pulse 79 80 74 77 75          Digital Medicine: Health  Follow Up    Patient reports she has been very stressed lately. Offered resources, patient declined.     Lifestyle Modifications:    1.Dietary Modifications (Sodium intake <2,000mg/day, food labels, dining out): Patient reports her doctor thinks she is eating to much salt. Patient states, "I am trying to watch my sodium intake better". Encouraged patient to read food labels and avoid dining out as ways to reduce sodium intake. Will f/u with patient next outreach.     2.Physical Activity: Deferred.     3.Medication Therapy: Patient has been compliant with the medication regimen. Patient's cardiologist requested to take Irbesartan at night.     4.Patient has the following medication side effects/concerns: None.     Follow up with  Myesha Quinones completed. No further questions or concerns. Will continue to follow up to achieve health goals.    "

## 2019-06-25 NOTE — TELEPHONE ENCOUNTER
Pt stated she started the 34 units Friday morning.     6/21    86    6/22    94    6/23    110    6/24     83    6/25     92

## 2019-06-26 ENCOUNTER — OFFICE VISIT (OUTPATIENT)
Dept: NEPHROLOGY | Facility: CLINIC | Age: 79
End: 2019-06-26
Payer: MEDICARE

## 2019-06-26 VITALS
DIASTOLIC BLOOD PRESSURE: 70 MMHG | HEIGHT: 63 IN | HEART RATE: 84 BPM | WEIGHT: 158 LBS | OXYGEN SATURATION: 95 % | BODY MASS INDEX: 28 KG/M2 | SYSTOLIC BLOOD PRESSURE: 158 MMHG

## 2019-06-26 DIAGNOSIS — N18.30 CHRONIC KIDNEY DISEASE, STAGE 3, MOD DECREASED GFR: Primary | ICD-10-CM

## 2019-06-26 DIAGNOSIS — N18.30 TYPE 2 DIABETES MELLITUS WITH STAGE 3 CHRONIC KIDNEY DISEASE, WITH LONG-TERM CURRENT USE OF INSULIN: ICD-10-CM

## 2019-06-26 DIAGNOSIS — Z79.4 TYPE 2 DIABETES MELLITUS WITH STAGE 3 CHRONIC KIDNEY DISEASE, WITH LONG-TERM CURRENT USE OF INSULIN: ICD-10-CM

## 2019-06-26 DIAGNOSIS — E11.22 TYPE 2 DIABETES MELLITUS WITH STAGE 3 CHRONIC KIDNEY DISEASE, WITH LONG-TERM CURRENT USE OF INSULIN: ICD-10-CM

## 2019-06-26 DIAGNOSIS — R80.9 PROTEINURIA, UNSPECIFIED TYPE: ICD-10-CM

## 2019-06-26 PROCEDURE — 1101F PT FALLS ASSESS-DOCD LE1/YR: CPT | Mod: HCNC,CPTII,S$GLB, | Performed by: INTERNAL MEDICINE

## 2019-06-26 PROCEDURE — 3078F PR MOST RECENT DIASTOLIC BLOOD PRESSURE < 80 MM HG: ICD-10-PCS | Mod: HCNC,CPTII,S$GLB, | Performed by: INTERNAL MEDICINE

## 2019-06-26 PROCEDURE — 1101F PR PT FALLS ASSESS DOC 0-1 FALLS W/OUT INJ PAST YR: ICD-10-PCS | Mod: HCNC,CPTII,S$GLB, | Performed by: INTERNAL MEDICINE

## 2019-06-26 PROCEDURE — 99999 PR PBB SHADOW E&M-EST. PATIENT-LVL III: CPT | Mod: PBBFAC,HCNC,, | Performed by: INTERNAL MEDICINE

## 2019-06-26 PROCEDURE — 99214 OFFICE O/P EST MOD 30 MIN: CPT | Mod: HCNC,S$GLB,, | Performed by: INTERNAL MEDICINE

## 2019-06-26 PROCEDURE — 3077F PR MOST RECENT SYSTOLIC BLOOD PRESSURE >= 140 MM HG: ICD-10-PCS | Mod: HCNC,CPTII,S$GLB, | Performed by: INTERNAL MEDICINE

## 2019-06-26 PROCEDURE — 99999 PR PBB SHADOW E&M-EST. PATIENT-LVL III: ICD-10-PCS | Mod: PBBFAC,HCNC,, | Performed by: INTERNAL MEDICINE

## 2019-06-26 PROCEDURE — 99214 PR OFFICE/OUTPT VISIT, EST, LEVL IV, 30-39 MIN: ICD-10-PCS | Mod: HCNC,S$GLB,, | Performed by: INTERNAL MEDICINE

## 2019-06-26 PROCEDURE — 3077F SYST BP >= 140 MM HG: CPT | Mod: HCNC,CPTII,S$GLB, | Performed by: INTERNAL MEDICINE

## 2019-06-26 PROCEDURE — 3078F DIAST BP <80 MM HG: CPT | Mod: HCNC,CPTII,S$GLB, | Performed by: INTERNAL MEDICINE

## 2019-06-26 NOTE — PROGRESS NOTES
NEPHROLOGY PROGRESS NOTE    Subjective:       Patient ID: Myesha Quinones is a 79 y.o. White female who presents for follow up evaluation of renal function. She was previously seen by Dr. Azul.     HPI   77 yo white female with a PMH of HTN, DM, nonischemic cardiomyopathy (AICD in place), HFpEF (50%), COPD, multinodular goiter, CKD3 baseline crt has historically been about 1.2-1.4. New AICD place din January.  Microalbuminuria has increased over the las 7 years from 69 ug/mg crt to 780 ug/mg crt, on irbesartan. HbgA1c 6.6-7.3. 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  CT abd 12/ 2017.  The kidneys are normal in size and location.  There is a 1.9 cm lesion within the left kidney demonstrating mixed fat and soft tissue attenuation consistent with an angiomyolipoma.  No hydronephrosis is seen.  She arrives today for follow up. The patient denies taking NSAIDs or new antibiotics, recreational drugs, recent episode of dehydration, diarrhea, nausea or vomiting, acute illness, hospitalization or exposure to IV radiocontrast.       Review of Systems   Constitutional: Negative for appetite change, chills, fatigue, fever and unexpected weight change.   HENT: Negative for congestion, facial swelling, hearing loss, nosebleeds and trouble swallowing.    Eyes: Negative for pain, discharge, redness and visual disturbance.   Respiratory: Negative for cough, chest tightness, shortness of breath and wheezing.    Cardiovascular: Negative for chest pain, palpitations and leg swelling.   Gastrointestinal: Negative for abdominal pain, constipation, diarrhea, nausea and vomiting.   Endocrine: Negative for cold intolerance, heat intolerance and polydipsia.   Genitourinary: Negative for decreased urine volume, difficulty urinating, dysuria, flank pain, hematuria and urgency.   Musculoskeletal: Negative for arthralgias, back pain, joint swelling and myalgias.   Skin: Negative for color change, pallor, rash and  "wound.   Neurological: Negative for dizziness, tremors, seizures, syncope, speech difficulty, weakness and headaches.   Hematological: Negative for adenopathy. Does not bruise/bleed easily.   Psychiatric/Behavioral: Negative for agitation, behavioral problems, dysphoric mood, self-injury and sleep disturbance.       Objective:     Blood pressure (!) 158/70, pulse 84, height 5' 3" (1.6 m), weight 71.7 kg (158 lb), SpO2 95 %.    Physical Exam   Constitutional: She is oriented to person, place, and time. She appears well-developed and well-nourished. No distress.   HENT:   Head: Normocephalic and atraumatic.   Mouth/Throat: No oropharyngeal exudate.   Eyes: Pupils are equal, round, and reactive to light. Conjunctivae and EOM are normal. Right eye exhibits no discharge. Left eye exhibits no discharge. No scleral icterus.   Neck: Normal range of motion. Neck supple. No JVD present. No tracheal deviation present. No thyromegaly present.   Cardiovascular: Normal rate, regular rhythm, normal heart sounds and intact distal pulses. Exam reveals no gallop.   No murmur heard.  minimal LE edema bilateral    Pulmonary/Chest: Effort normal and breath sounds normal. No stridor. No respiratory distress. She has no wheezes. She has no rales. She exhibits no tenderness.   Abdominal: Soft. Bowel sounds are normal. She exhibits no distension and no mass. There is no tenderness. There is no rebound and no guarding. No hernia.   Musculoskeletal: She exhibits no edema or tenderness.   Lymphadenopathy:     She has no cervical adenopathy.   Neurological: She is alert and oriented to person, place, and time. She exhibits normal muscle tone.   Skin: Skin is warm and dry. No rash noted. She is not diaphoretic. No erythema.   Psychiatric: She has a normal mood and affect. Judgment and thought content normal.   Nursing note and vitals reviewed.      LABS  Serum Cr 1.2 mg/dL  UPCR 2.4 g/g    Assessment:       1. 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 There was a concern for ANCA-associated GN, very unlikely to be the culprit in her case given clinical course. Proteinuria worse, coincides with worse glycemia. Insulin dose increased 1 week ago. We may need to add a loop diuretic.    2. Proteinuria, unspecified type. Claims white coat HTN. Continue irbesartan, chlorthalidone, carvedilol, clonidine patch, diltiazem.        Plan:       1. Continue irbesartan and chlorthalidone and other BP meds  2. Tight glycemic control, home BP monitoring  3. Avoid NSAIDs  4. RTC in 3 mo

## 2019-06-27 ENCOUNTER — TELEPHONE (OUTPATIENT)
Dept: PULMONOLOGY | Facility: CLINIC | Age: 79
End: 2019-06-27

## 2019-06-27 ENCOUNTER — PROCEDURE VISIT (OUTPATIENT)
Dept: OPHTHALMOLOGY | Facility: CLINIC | Age: 79
End: 2019-06-27
Payer: MEDICARE

## 2019-06-27 VITALS — SYSTOLIC BLOOD PRESSURE: 135 MMHG | DIASTOLIC BLOOD PRESSURE: 66 MMHG | HEART RATE: 74 BPM

## 2019-06-27 DIAGNOSIS — H35.033 HYPERTENSIVE RETINOPATHY, BILATERAL: ICD-10-CM

## 2019-06-27 DIAGNOSIS — Z79.4 CONTROLLED TYPE 2 DIABETES MELLITUS WITH BOTH EYES AFFECTED BY MILD NONPROLIFERATIVE RETINOPATHY AND MACULAR EDEMA, WITH LONG-TERM CURRENT USE OF INSULIN: Primary | ICD-10-CM

## 2019-06-27 DIAGNOSIS — E11.3213 CONTROLLED TYPE 2 DIABETES MELLITUS WITH BOTH EYES AFFECTED BY MILD NONPROLIFERATIVE RETINOPATHY AND MACULAR EDEMA, WITH LONG-TERM CURRENT USE OF INSULIN: Primary | ICD-10-CM

## 2019-06-27 PROCEDURE — 92226 PR SPECIAL EYE EXAM, SUBSEQUENT: CPT | Mod: 50,HCNC,S$GLB, | Performed by: OPHTHALMOLOGY

## 2019-06-27 PROCEDURE — 92014 PR EYE EXAM, EST PATIENT,COMPREHESV: ICD-10-PCS | Mod: HCNC,S$GLB,, | Performed by: OPHTHALMOLOGY

## 2019-06-27 PROCEDURE — 92014 COMPRE OPH EXAM EST PT 1/>: CPT | Mod: HCNC,S$GLB,, | Performed by: OPHTHALMOLOGY

## 2019-06-27 PROCEDURE — 92226 PR SPECIAL EYE EXAM, SUBSEQUENT: ICD-10-PCS | Mod: 50,HCNC,S$GLB, | Performed by: OPHTHALMOLOGY

## 2019-06-27 RX ORDER — FLUTICASONE PROPIONATE AND SALMETEROL 250; 50 UG/1; UG/1
1 POWDER RESPIRATORY (INHALATION) 2 TIMES DAILY
Qty: 180 EACH | Refills: 3 | Status: SHIPPED | OUTPATIENT
Start: 2019-06-27 | End: 2020-05-27 | Stop reason: SDUPTHER

## 2019-06-27 NOTE — PROGRESS NOTES
"HPI       DLS- 4/25/2019 Dr. Arechiga       No gtts       OCT - OD No ME  OS - central decrease    Prior FA - Foveal MA's OS   No NV of NP OU      A/P    1. 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 DME OS  S/p Avastin OS x 5  S/p Ozurdex OS x 4 4/19  Foveal MA"s OS - may need chronic pharmacotherapy    Doing well, will need chronic steroid    Good IOP Watch today    Verify Iluvien    3. HTN Ret OU  BS/BP/chol control    4. PCIOL OU  With small PCO      2 months OCT  "

## 2019-06-28 ENCOUNTER — OFFICE VISIT (OUTPATIENT)
Dept: PULMONOLOGY | Facility: CLINIC | Age: 79
End: 2019-06-28
Payer: MEDICARE

## 2019-06-28 ENCOUNTER — TELEPHONE (OUTPATIENT)
Dept: PULMONOLOGY | Facility: CLINIC | Age: 79
End: 2019-06-28

## 2019-06-28 ENCOUNTER — OFFICE VISIT (OUTPATIENT)
Dept: SLEEP MEDICINE | Facility: CLINIC | Age: 79
End: 2019-06-28
Payer: MEDICARE

## 2019-06-28 VITALS
HEIGHT: 63 IN | SYSTOLIC BLOOD PRESSURE: 160 MMHG | OXYGEN SATURATION: 95 % | WEIGHT: 159.63 LBS | HEART RATE: 78 BPM | BODY MASS INDEX: 28.29 KG/M2 | DIASTOLIC BLOOD PRESSURE: 72 MMHG

## 2019-06-28 VITALS
WEIGHT: 157.81 LBS | HEIGHT: 63 IN | DIASTOLIC BLOOD PRESSURE: 78 MMHG | BODY MASS INDEX: 27.96 KG/M2 | SYSTOLIC BLOOD PRESSURE: 184 MMHG | HEART RATE: 87 BPM

## 2019-06-28 DIAGNOSIS — I10 ESSENTIAL HYPERTENSION: ICD-10-CM

## 2019-06-28 DIAGNOSIS — J45.20 MILD INTERMITTENT CHRONIC ASTHMA WITHOUT COMPLICATION: ICD-10-CM

## 2019-06-28 DIAGNOSIS — I12.9 TYPE 2 DM WITH CKD STAGE 3 AND HYPERTENSION: ICD-10-CM

## 2019-06-28 DIAGNOSIS — E11.22 TYPE 2 DM WITH CKD STAGE 3 AND HYPERTENSION: ICD-10-CM

## 2019-06-28 DIAGNOSIS — G47.33 OSA (OBSTRUCTIVE SLEEP APNEA): ICD-10-CM

## 2019-06-28 DIAGNOSIS — N18.30 TYPE 2 DM WITH CKD STAGE 3 AND HYPERTENSION: ICD-10-CM

## 2019-06-28 DIAGNOSIS — J30.2 SEASONAL ALLERGIES: ICD-10-CM

## 2019-06-28 DIAGNOSIS — G47.33 OBSTRUCTIVE SLEEP APNEA: ICD-10-CM

## 2019-06-28 DIAGNOSIS — I42.8 NONISCHEMIC CARDIOMYOPATHY: Primary | ICD-10-CM

## 2019-06-28 PROCEDURE — 99999 PR PBB SHADOW E&M-EST. PATIENT-LVL III: ICD-10-PCS | Mod: PBBFAC,HCNC,, | Performed by: NURSE PRACTITIONER

## 2019-06-28 PROCEDURE — 1101F PR PT FALLS ASSESS DOC 0-1 FALLS W/OUT INJ PAST YR: ICD-10-PCS | Mod: HCNC,CPTII,S$GLB, | Performed by: NURSE PRACTITIONER

## 2019-06-28 PROCEDURE — 1101F PT FALLS ASSESS-DOCD LE1/YR: CPT | Mod: HCNC,CPTII,S$GLB, | Performed by: NURSE PRACTITIONER

## 2019-06-28 PROCEDURE — 3078F PR MOST RECENT DIASTOLIC BLOOD PRESSURE < 80 MM HG: ICD-10-PCS | Mod: HCNC,CPTII,S$GLB, | Performed by: NURSE PRACTITIONER

## 2019-06-28 PROCEDURE — 3077F PR MOST RECENT SYSTOLIC BLOOD PRESSURE >= 140 MM HG: ICD-10-PCS | Mod: HCNC,CPTII,S$GLB, | Performed by: NURSE PRACTITIONER

## 2019-06-28 PROCEDURE — 99214 PR OFFICE/OUTPT VISIT, EST, LEVL IV, 30-39 MIN: ICD-10-PCS | Mod: HCNC,S$GLB,, | Performed by: NURSE PRACTITIONER

## 2019-06-28 PROCEDURE — 99999 PR PBB SHADOW E&M-EST. PATIENT-LVL IV: ICD-10-PCS | Mod: PBBFAC,HCNC,, | Performed by: NURSE PRACTITIONER

## 2019-06-28 PROCEDURE — 3078F DIAST BP <80 MM HG: CPT | Mod: HCNC,CPTII,S$GLB, | Performed by: NURSE PRACTITIONER

## 2019-06-28 PROCEDURE — 99999 PR PBB SHADOW E&M-EST. PATIENT-LVL IV: CPT | Mod: PBBFAC,HCNC,, | Performed by: NURSE PRACTITIONER

## 2019-06-28 PROCEDURE — 99214 OFFICE O/P EST MOD 30 MIN: CPT | Mod: HCNC,S$GLB,, | Performed by: NURSE PRACTITIONER

## 2019-06-28 PROCEDURE — 3077F SYST BP >= 140 MM HG: CPT | Mod: HCNC,CPTII,S$GLB, | Performed by: NURSE PRACTITIONER

## 2019-06-28 PROCEDURE — 99999 PR PBB SHADOW E&M-EST. PATIENT-LVL III: CPT | Mod: PBBFAC,HCNC,, | Performed by: NURSE PRACTITIONER

## 2019-06-28 NOTE — PATIENT INSTRUCTIONS
Nasal Saline:     A. Tilt head back and squirt into nostril 2-3 times until you taste saline in back of throat. Spit, and blow nose. Do this 4 times, alternating right and left nostril. Do this routine 2-3 times per day- at least once in the shower.    Gargle with warm salt water 2-3 times per day. About 1 cup, fairly warm, fairly salty    Continue Xyzal at bedtime    Flonase 2 sprays each nostril daily    Delsym cough syrup twice a day (ok to hold off on this unless symptoms worsen)    Continue all of these things for 2-3 weeks    Continue Advair

## 2019-06-28 NOTE — ASSESSMENT & PLAN NOTE
Continue medications as now.  Discussed increasing dose to Advair 500/50- patient would like to remain on current dosing

## 2019-06-28 NOTE — PROGRESS NOTES
This 79 y.o. female returns to Sleep Clinic regarding management of obstructive sleep apnea and equipment check. She was last seen 6/14/18    Encore:  Date Range: 5/28/2019 - 6/26/2019     Hide      Compliance Summary  Apnea Indices    Days with Device Usage:  30 days  Average AHI:  0.5    Percentage of Days >=4 Hours:  100.0%     Average Usage (Days Used):  6 hrs. 49 mins. 36 secs.     Average Usage (All Days):  6 hrs. 49 mins. 36 secs.     0% leak  90% tile 10.      She continues to use nightly apap 9-13cm. Can't sleep w/o machine.using Eson mask w/chin strap. Sometimes water not all gone but denies oral drying. Denies pressure intolerance. Wakes from sleep arouses but returns quickly to sleep. Denies overt mask leaks. Snoring resolved. Sleep is more consolidated. Getting regular supplies. Now in digial bp monitoring progrma/high today and HgBA1c 8.9. Hard to avoid salt in diet.    HISTORY  9/26/16  She has been seen in the context of medical comorbidities of cardiomyopathy and hypertension. She previously had sleep symptoms of fragmented sleep with multiple arousals every 2 to 3 minutes and loud snoring. This has resolved with CPAP.    Not snoring with CPAP.  Can't sleep without CPAP. Sleeps better with it. Very fragmented sleep without it.    She is generally satisfied with CPAP at 11 cm on a nightly basis.   Chin strap still annoying but using with her nasal mask.  -She tolerates pressure   -Adequate humidity, heat at 2.5   -occasional oral drying    ESS = 11 (5)    She continues to feel more rested and energized during the day as a result of CPAP.     CPAP interrogtaion: F&P machine; set at 11 cm;   total usage 3468 hours (prior 5690 hours), blowing at 11.0 cm    DME = Access      5-3-18:  She continues to use cpap nightly. Sleep remains consolidated, less disrupted and snoring resolved. Eligible for new mask type and machine. Sleeps well with therapy. Snoring resolved. Uses chin strap. Has soclean machine  ready to use with new machine.   Interrogation--manometer 12.4cm. Old nasal mask, old sleepstyle machine  2016 Echo:   Low normal to mildly depressed left ventricular systolic function (EF 50-55%).     2 - Left ventricular diastolic dysfunction.     3 - Severe left atrial enlargement.     4 - Normal right ventricular systolic function .     5 - Trivial to mild tricuspid regurgitation.     6 - Intermediate central venous pressure.     7 - Pulmonary hypertension. The estimated PA systolic pressure is 42 mmHg    4/2018   HgbA1c 6.7    6/14/18:  Got new machine 5/14th.Using nightly but dislikes dreamwear mask, felt pressure was blowing too high. It was cpap 12cm. Her pressure was reduced to apap 9-13cm. And she went back to old nose mask. Could sleep better with it. Reports disrupted sleep though and  heard snoring just last night, otherwise snoring is resolved. She uses her chin strap. Having some sinus congestion. Wants new mask. Wgt stable. BP stable.   Interrogation- avg 5.3-7h/n. AHI 0.3, 0% periodic. 90% tile 9-10.2cm. % mask fit. 23/30d>4h.       BASELINE SLEEP STUDY 6/09: AHI 23.7, worse in supine, oxygen abi 77%. 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, with long-term current use of insulin 2/22/2018    COPD (chronic obstructive pulmonary disease)     COPD exacerbation 4/8/2018    Defibrillator discharge     Diabetes mellitus     Diabetes  "mellitus type II     Diabetes with neurologic complications     Goiter     MNG    HX: breast cancer     Hyperlipidemia     Hypertension     Iron deficiency anemia 5/16/2017    Left kidney mass     Meningioma     Osteoporosis, postmenopausal     Pacemaker     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 Remains adherent with cpap, symptoms improved. 6-1418: Remains adherent with PAP, since lowering cpap to apap 9-13cm and old mask using over her nose, tolerating pressure fine. Snoring resolved. Sleep just recently not as consolidated 6/28/19: Symptoms improved overall/benefiting. Excellent adherence, AHI<5  She has medical comorbidities of non-ischemic cardiomyopathy, CAD (has ICD), asthma (recent exacerbation), pulmonary hypertension, DM with CKD and long term use insulin      PLAN:   1. Continue apap 9-13cm. [facility] DME supplies. RTC 1 yr, sooner if neederd  2. Continue to see cards re: CAD/cardiomyopathy and pulmonary hypertension  3. Discussed effectiveness of her therapy and potential ramifications of untreated KHOA, including heart disease, hypertension, cognitive difficulties, stroke, and diabetes.  See PCP /monitoring program for DM and HTN mgt  "

## 2019-06-28 NOTE — PROGRESS NOTES
Subjective:       Patient ID: Myesha Quinones is a 79 y.o. female.    Chief Complaint: Asthma    HPI:   Myesha Quinones is a 79 y.o. female who presents with evaluation for cough and asthma.  She was seen by Dr. Soler many years ago but is new to me.  She is referred to us by Dr. Gaston her PCP. She reports that she has a productive cough that is varying in severity and is worse when her allergies are worse.  She has been on Advair 250 for many years and finds that her symptoms are rather well controlled.  She uses her Ventolin maybe once or twice a week, and her nebs even less frequently. She reports that pollen seems to drive her symptoms .  She has used Astelin and Flonase in the past but is not currently using it. She completed a course of Augmentin for cellulitis yesterday.       Review of Systems   Constitutional: Negative for fever, chills and night sweats.   HENT: Positive for postnasal drip. Negative for trouble swallowing.    Respiratory: Positive for cough, sputum production (phlegm varying in color), chest tightness, wheezing, dyspnea on extertion and use of rescue inhaler. Negative for choking.    Cardiovascular: Positive for leg swelling. Negative for chest pain and palpitations.   Endocrine: Negative for cold intolerance and heat intolerance.    Musculoskeletal: Negative for arthralgias.   Skin: Negative for rash.   Gastrointestinal: Negative for acid reflux.   Neurological: Negative for dizziness and light-headedness.   Psychiatric/Behavioral: Negative for sleep disturbance.         Social History     Tobacco Use    Smoking status: Never Smoker    Smokeless tobacco: Never Used   Substance Use Topics    Alcohol use: No     Alcohol/week: 0.0 oz       Review of patient's allergies indicates:   Allergen Reactions    Iodine and iodide containing products Hives    Nifedipine      weakness     Past Medical History:   Diagnosis Date    Allergy     Asthma     Basal cell carcinoma     left  forehead    Basal cell carcinoma     left nose    Basal cell carcinoma 05/20/2015    right nose    Basal cell carcinoma 12/22/2015    left lower post neck    Breast cancer     CAD (coronary artery disease)     Cardiomyopathy     Cardiomyopathy, ischemic     Cataract     CHF (congestive heart failure)     Chronic kidney disease, stage 3, mod decreased GFR 2/14/2017    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, with long-term current use of insulin 2/22/2018    COPD (chronic obstructive pulmonary disease)     COPD exacerbation 4/8/2018    Defibrillator discharge     Diabetes mellitus     Diabetes mellitus type II     Diabetes with neurologic complications     Goiter     MNG    HX: breast cancer     Hyperlipidemia     Hypertension     Iron deficiency anemia 5/16/2017    Left kidney mass     Meningioma     Osteoporosis, postmenopausal     Pacemaker     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    Skin cancer     s/p excision    Sleep apnea     CPAP    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/2015    mid forehead    Unspecified vitamin D deficiency     Ventricular tachycardia     Vitamin B12 deficiency     Vitamin D deficiency disease      Past Surgical History:   Procedure Laterality Date    BASAL CELL CARCINOMA EXCISION      posterior neck and nose    BREAST BIOPSY      BREAST CYST EXCISION Left     BREAST SURGERY      CARDIAC DEFIBRILLATOR PLACEMENT      x 2    CATARACT EXTRACTION W/  INTRAOCULAR LENS IMPLANT Bilateral     CHOLECYSTECTOMY      Colonoscopy  N/A 8/27/2014    Performed by Leonidas Velasquez [facility] at [facility] (4TH FLR)    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    FRACTURE SURGERY      left elbow and wrist as a child    HYSTERECTOMY      MASTECTOMY Right     REPLACEMENT, ICD GENERATOR N/A 12/17/2018    Performed by [facility]keon [facility] at [facility]    Revision, Skin Pocket, For  Cardioverter-Defibrillator  12/17/2018    [facility]    SQUAMOUS CELL CARCINOMA EXCISION      remved from forehead    TONSILLECTOMY       Current Outpatient Medications on File Prior to Visit   Medication Sig    ACCU-CHEK HECTOR CONTROL SOLN Soln     ACCU-CHEK HECTOR PLUS METER Misc     albuterol 90 mcg/actuation inhaler Inhale 2 puffs into the lungs every 6 (six) hours as needed for Wheezing. Rescue    albuterol-ipratropium (DUO-NEB) 2.5 mg-0.5 mg/3 mL nebulizer solution TAKE 1 VIAL BY NEBULIZATION EVERY 4 (FOUR) HOURS. RESCUE    aspirin (ENTERIC COATED ASPIRIN) 81 MG EC tablet Take 1 tablet by mouth once daily.     benzonatate (TESSALON PERLES) 100 MG capsule Take 2 capsules (200 mg total) by mouth 3 (three) times daily as needed for Cough.    blood sugar diagnostic (ACCU-CHEK HECTOR) Strp Uses Accu-Check Hector meter to test BG 4x/day (Patient taking differently: Uses Accu-Check Hector meter to test BG 4x/day checking 2x day)    carvedilol (COREG) 25 MG tablet TAKE 2 TABLETS (50 MG TOTAL) BY MOUTH 2 (TWO) TIMES DAILY.    chlorthalidone (HYGROTEN) 25 MG Tab Take 1 tablet (25 mg total) by mouth once daily.    cholecalciferol, vitamin D3, (VITAMIN D3) 2,000 unit Tab Take by mouth once daily.    cloNIDine 0.1 mg/24 hr td ptwk (CATAPRES) 0.1 mg/24 hr Place 1 patch onto the skin every 7 days.    CYANOCOBALAMIN, VITAMIN B-12, (B-12 DOTS ORAL) Take 1 tablet by mouth once daily.    diltiaZEM (CARDIZEM CD) 120 MG Cp24 Take 1 capsule (120 mg total) by mouth once daily.    fluticasone-salmeterol diskus inhaler 250-50 mcg Inhale 1 puff into the lungs 2 (two) times daily. This is a change from one month    hydrALAZINE (APRESOLINE) 100 MG tablet TAKE 1 TABLET (100 MG TOTAL) BY MOUTH 3 (THREE) TIMES DAILY.    insulin (LANTUS SOLOSTAR U-100 INSULIN) glargine 100 units/mL (3mL) SubQ pen Inject 30 Units into the skin every evening. (Patient taking differently: Inject 34 Units into the  "skin every evening. )    irbesartan (AVAPRO) 300 MG tablet Take 1 tablet (300 mg total) by mouth every evening.    lancets (ACCU-CHEK SOFTCLIX LANCETS) Misc Uses Accu-Chek Angelica meter to test BG 4x/day    lancets 30 gauge Misc     lancing device Misc     linagliptin (TRADJENTA) 5 mg Tab tablet Take 1 tablet (5 mg total) by mouth once daily.    magnesium oxide (MAG-OX) 400 mg tablet Take 1 tablet (400 mg total) by mouth once daily.    metFORMIN (GLUCOPHAGE) 500 MG tablet Take 2 tablets (1,000 mg total) by mouth 2 (two) times daily with meals. (Patient taking differently: Take 500 mg by mouth 2 (two) times daily with meals. Reduced because of GFR)    nitroGLYCERIN (NITROSTAT) 0.4 MG SL tablet Place 1 tablet under the tongue continuous prn.      omega-3 fatty acids (FISH OIL) 500 mg Cap Take 1 capsule by mouth Twice daily.    pen needle, diabetic (BD INSULIN PEN NEEDLE UF MINI) 31 gauge x 3/16" Ndle USE WITH LANTUS AT BEDTIME    pravastatin (PRAVACHOL) 40 MG tablet Take 1 tablet (40 mg total) by mouth once daily.    amoxicillin-clavulanate 500-125mg (AUGMENTIN) 500-125 mg Tab Take 1 tablet (500 mg total) by mouth 2 (two) times daily. Reduced for renal function    azelastine (ASTELIN) 137 mcg (0.1 %) nasal spray 1 spray (137 mcg total) by Nasal route 2 (two) times daily. For severe allergy    fluticasone (FLONASE) 50 mcg/actuation nasal spray 2 sprays (100 mcg total) by Each Nare route once daily.    levocetirizine (XYZAL) 5 MG tablet Take 1 tablet (5 mg total) by mouth every evening.     Current Facility-Administered Medications on File Prior to Visit   Medication    0.9%  NaCl infusion    acetaminophen tablet 650 mg       Objective:      Vitals:    06/28/19 0833   BP: (!) 160/72   Pulse: 78     Physical Exam   Constitutional: She is oriented to person, place, and time. She appears well-developed and well-nourished.   HENT:   Head: Normocephalic.   Neck: Normal range of motion. Neck supple. "   Cardiovascular: Normal rate.   Pulmonary/Chest: Normal expansion and breath sounds normal. No respiratory distress. She has no decreased breath sounds. She has no wheezes. She has no rhonchi.   Abdominal: Soft. She exhibits no distension. There is no hepatosplenomegaly. There is no tenderness.   Musculoskeletal: Normal range of motion. She exhibits edema.   Lymphadenopathy:     She has no cervical adenopathy.   Neurological: She is alert and oriented to person, place, and time. Gait normal.   Skin: Skin is warm and dry. No cyanosis. Nails show no clubbing.   Psychiatric: She has a normal mood and affect.   Vitals reviewed.    Personal Diagnostic Review      Assessment:     Problem List Items Addressed This Visit        Pulmonary    Asthma, chronic    Overview     Stable on Advair 250/50 BID. Ventolin for rescue and Duo Nebs PRN         Current Assessment & Plan     Continue medications as now.  Discussed increasing dose to Advair 500/50- patient would like to remain on current dosing            Other    KHOA (obstructive sleep apnea)    Overview     Stable on CPAP         Current Assessment & Plan     Will continue use as now.          Seasonal allergies    Overview     Taking Xyzal and flonase         Current Assessment & Plan     Continue with the addition of saline nasal rinses as described in AVS.             RTC 6 months. Suspect seasonal allergies are most causative of her cough.

## 2019-06-28 NOTE — LETTER
June 28, 2019      Emelina Gaston MD  [address]  [facility] [address]           [facility]  [address]  [facility] [address]  Phone: [phone]          Patient: Myesha Quinones   MR Number: [id number]   YOB: 1940   Date of Visit: 6/28/2019       Dear Dr. Emelina Gaston:    Thank you for referring Myesha Quinones to me for evaluation. Attached you will find relevant portions of my assessment and plan of care.    If you have questions, please do not hesitate to call me. I look forward to following Myesha Quinones along with you.    Sincerely,    Meaghan Zhu, DNP    Enclosure  CC:  No Recipients    If you would like to receive this communication electronically, please contact [email or web address] or [phone] to request more information on [employer] Link access.    For providers and/or their staff who would like to refer a patient to [facility], please contact us through our one-stop-shop provider referral line, [facility], at [phone].    If you feel you have received this communication in error or would no longer like to receive these types of communications, please e-mail [email or web address]

## 2019-07-01 RX ORDER — LEVOCETIRIZINE DIHYDROCHLORIDE 5 MG/1
5 TABLET, FILM COATED ORAL NIGHTLY
Qty: 30 TABLET | Refills: 11 | Status: SHIPPED | OUTPATIENT
Start: 2019-07-01 | End: 2020-07-20 | Stop reason: SDUPTHER

## 2019-07-08 ENCOUNTER — PATIENT OUTREACH (OUTPATIENT)
Dept: OTHER | Facility: OTHER | Age: 79
End: 2019-07-08

## 2019-07-08 NOTE — PROGRESS NOTES
"Last 5 Patient Entered Readings                                      Current 30 Day Average: 131/62     Recent Readings 7/5/2019 7/3/2019 7/1/2019 6/29/2019 6/27/2019    SBP (mmHg) 119 143 128 138 125    DBP (mmHg) 57 69 62 60 72    Pulse 70 65 73 71 65        Mrs. Quinones's BP readings have improved. She feels "spaced out" after taking all medications in the morning. She will move diltiazem to the evening along with irbesartan and PM doses of carvedilol and hydralazine.     Per 30 day average, 131/62 mmHg, patient's BP is not approaching goal.     Will continue to monitor regularly. Will follow up in 3-4 weeks, sooner if BP begins to trend upward or downward.    Asked patient to call or message with questions or concerns.     Current HTN regimen:  Hypertension Medications             carvedilol (COREG) 25 MG tablet TAKE 2 TABLETS (50 MG TOTAL) BY MOUTH 2 (TWO) TIMES DAILY.    chlorthalidone (HYGROTEN) 25 MG Tab Take 1 tablet (25 mg total) by mouth once daily.    cloNIDine 0.1 mg/24 hr td ptwk (CATAPRES) 0.1 mg/24 hr Place 1 patch onto the skin every 7 days.    diltiaZEM (CARDIZEM CD) 120 MG Cp24 Take 1 capsule (120 mg total) by mouth once daily.    hydrALAZINE (APRESOLINE) 100 MG tablet TAKE 1 TABLET (100 MG TOTAL) BY MOUTH 3 (THREE) TIMES DAILY.    irbesartan (AVAPRO) 300 MG tablet Take 1 tablet (300 mg total) by mouth every evening.    nitroGLYCERIN (NITROSTAT) 0.4 MG SL tablet Place 1 tablet under the tongue continuous prn.                              "

## 2019-07-15 RX ORDER — LINAGLIPTIN 5 MG/1
TABLET, FILM COATED ORAL
Qty: 90 TABLET | Refills: 3 | Status: SHIPPED | OUTPATIENT
Start: 2019-07-15 | End: 2019-10-15 | Stop reason: SDUPTHER

## 2019-07-24 ENCOUNTER — TELEPHONE (OUTPATIENT)
Dept: OPHTHALMOLOGY | Facility: CLINIC | Age: 79
End: 2019-07-24

## 2019-07-31 ENCOUNTER — PATIENT OUTREACH (OUTPATIENT)
Dept: OTHER | Facility: OTHER | Age: 79
End: 2019-07-31

## 2019-07-31 NOTE — PROGRESS NOTES
"Last 5 Patient Entered Readings                                      Current 30 Day Average: 142/65     Recent Readings 7/31/2019 7/29/2019 7/27/2019 7/24/2019 7/19/2019    SBP (mmHg) 147 139 153 147 138    DBP (mmHg) 69 64 72 63 66    Pulse 73 80 73 76 71        Per 30 day average, 142/65 mmHg, patient's BP is not at goal, but overall is stable.     Mrs. Quinones's BP average is higher, but she is feeling better. She no longer feels "spaced out" since changing the timing of her diltiazem to the evening.     Will continue to monitor regularly. Will follow up in 1-2 months, sooner if BP begins to trend upward or downward.    Asked patient to call or message with questions or concerns.     Current HTN regimen:  Hypertension Medications             carvedilol (COREG) 25 MG tablet TAKE 2 TABLETS (50 MG TOTAL) BY MOUTH 2 (TWO) TIMES DAILY.    chlorthalidone (HYGROTEN) 25 MG Tab Take 1 tablet (25 mg total) by mouth once daily.    cloNIDine 0.1 mg/24 hr td ptwk (CATAPRES) 0.1 mg/24 hr Place 1 patch onto the skin every 7 days.    diltiaZEM (CARDIZEM CD) 120 MG Cp24 Take 1 capsule (120 mg total) by mouth once daily.    hydrALAZINE (APRESOLINE) 100 MG tablet TAKE 1 TABLET (100 MG TOTAL) BY MOUTH 3 (THREE) TIMES DAILY.    irbesartan (AVAPRO) 300 MG tablet Take 1 tablet (300 mg total) by mouth every evening.    nitroGLYCERIN (NITROSTAT) 0.4 MG SL tablet Place 1 tablet under the tongue continuous prn.                          "

## 2019-08-08 DIAGNOSIS — I10 ESSENTIAL HYPERTENSION: ICD-10-CM

## 2019-08-08 RX ORDER — ALBUTEROL SULFATE 90 UG/1
AEROSOL, METERED RESPIRATORY (INHALATION)
Qty: 18 INHALER | Refills: 12 | Status: SHIPPED | OUTPATIENT
Start: 2019-08-08 | End: 2020-07-23 | Stop reason: SDUPTHER

## 2019-08-08 RX ORDER — HYDRALAZINE HYDROCHLORIDE 100 MG/1
100 TABLET, FILM COATED ORAL 3 TIMES DAILY
Qty: 270 TABLET | Refills: 2 | Status: ON HOLD | OUTPATIENT
Start: 2019-08-08 | End: 2019-12-10 | Stop reason: SDUPTHER

## 2019-08-19 ENCOUNTER — PATIENT OUTREACH (OUTPATIENT)
Dept: OTHER | Facility: OTHER | Age: 79
End: 2019-08-19

## 2019-08-19 NOTE — PROGRESS NOTES
Last 5 Patient Entered Readings                                      Current 30 Day Average: 144/66     Recent Readings 8/15/2019 8/13/2019 8/12/2019 8/9/2019 8/6/2019    SBP (mmHg) 161 122 151 150 139    DBP (mmHg) 70 65 70 67 61    Pulse 77 73 76 77 77            Digital Medicine: Health  Follow Up    Left voicemail to follow up with Mrs. Myesha Quinones.  Current BP average 144/66 mmHg is not at goal, [140/90 mmHg]. SBP slightly elevated.

## 2019-08-22 ENCOUNTER — CLINICAL SUPPORT (OUTPATIENT)
Dept: CARDIOLOGY | Facility: HOSPITAL | Age: 79
End: 2019-08-22
Payer: MEDICARE

## 2019-08-22 DIAGNOSIS — Z95.810 PRESENCE OF AUTOMATIC (IMPLANTABLE) CARDIAC DEFIBRILLATOR: ICD-10-CM

## 2019-08-22 PROCEDURE — 93296 REM INTERROG EVL PM/IDS: CPT | Performed by: INTERNAL MEDICINE

## 2019-08-26 ENCOUNTER — TELEPHONE (OUTPATIENT)
Dept: INTERNAL MEDICINE | Facility: CLINIC | Age: 79
End: 2019-08-26

## 2019-08-26 NOTE — TELEPHONE ENCOUNTER
----- Message from [employer] sent at 8/26/2019 11:41 AM CDT -----  Contact: self/[phone]  Pt is having troubles with her leg and would like to be referred to someone who can help her. She needs to make sure that she does not have a blood clot. Please call and advise.        Thank You

## 2019-08-27 ENCOUNTER — OFFICE VISIT (OUTPATIENT)
Dept: INTERNAL MEDICINE | Facility: CLINIC | Age: 79
End: 2019-08-27
Payer: MEDICARE

## 2019-08-27 ENCOUNTER — HOSPITAL ENCOUNTER (OUTPATIENT)
Dept: RADIOLOGY | Facility: HOSPITAL | Age: 79
Discharge: HOME | End: 2019-08-27
Attending: INTERNAL MEDICINE
Payer: MEDICARE

## 2019-08-27 VITALS
WEIGHT: 160.25 LBS | BODY MASS INDEX: 28.39 KG/M2 | HEART RATE: 85 BPM | DIASTOLIC BLOOD PRESSURE: 62 MMHG | OXYGEN SATURATION: 97 % | SYSTOLIC BLOOD PRESSURE: 132 MMHG | HEIGHT: 63 IN

## 2019-08-27 DIAGNOSIS — R60.9 EDEMA, UNSPECIFIED TYPE: ICD-10-CM

## 2019-08-27 DIAGNOSIS — M25.561 ACUTE PAIN OF RIGHT KNEE: Primary | ICD-10-CM

## 2019-08-27 DIAGNOSIS — M25.561 ACUTE PAIN OF RIGHT KNEE: ICD-10-CM

## 2019-08-27 DIAGNOSIS — M79.89 RIGHT LEG SWELLING: ICD-10-CM

## 2019-08-27 PROCEDURE — 73560 X-RAY EXAM OF KNEE 1 OR 2: CPT | Mod: 26,59,HCNC,LT | Performed by: RADIOLOGY

## 2019-08-27 PROCEDURE — 73562 X-RAY EXAM OF KNEE 3: CPT | Mod: TC,HCNC,RT

## 2019-08-27 PROCEDURE — 3078F DIAST BP <80 MM HG: CPT | Mod: HCNC,CPTII,S$GLB, | Performed by: INTERNAL MEDICINE

## 2019-08-27 PROCEDURE — 93971 EXTREMITY STUDY: CPT | Mod: 26,HCNC,RT, | Performed by: RADIOLOGY

## 2019-08-27 PROCEDURE — 99214 OFFICE O/P EST MOD 30 MIN: CPT | Mod: HCNC,S$GLB,, | Performed by: INTERNAL MEDICINE

## 2019-08-27 PROCEDURE — 99214 PR OFFICE/OUTPT VISIT, EST, LEVL IV, 30-39 MIN: ICD-10-PCS | Mod: HCNC,S$GLB,, | Performed by: INTERNAL MEDICINE

## 2019-08-27 PROCEDURE — 73562 X-RAY EXAM OF KNEE 3: CPT | Mod: 26,HCNC,RT, | Performed by: RADIOLOGY

## 2019-08-27 PROCEDURE — 93971 EXTREMITY STUDY: CPT | Mod: TC,HCNC,RT

## 2019-08-27 PROCEDURE — 3075F SYST BP GE 130 - 139MM HG: CPT | Mod: HCNC,CPTII,S$GLB, | Performed by: INTERNAL MEDICINE

## 2019-08-27 PROCEDURE — 1101F PT FALLS ASSESS-DOCD LE1/YR: CPT | Mod: HCNC,CPTII,S$GLB, | Performed by: INTERNAL MEDICINE

## 2019-08-27 PROCEDURE — 93971 US LOWER EXTREMITY VEINS RIGHT: ICD-10-PCS | Mod: 26,HCNC,RT, | Performed by: RADIOLOGY

## 2019-08-27 PROCEDURE — 73560 XR KNEE ORTHO RIGHT: ICD-10-PCS | Mod: 26,59,HCNC,LT | Performed by: RADIOLOGY

## 2019-08-27 PROCEDURE — 3078F PR MOST RECENT DIASTOLIC BLOOD PRESSURE < 80 MM HG: ICD-10-PCS | Mod: HCNC,CPTII,S$GLB, | Performed by: INTERNAL MEDICINE

## 2019-08-27 PROCEDURE — 99999 PR PBB SHADOW E&M-EST. PATIENT-LVL V: ICD-10-PCS | Mod: PBBFAC,HCNC,, | Performed by: INTERNAL MEDICINE

## 2019-08-27 PROCEDURE — 99999 PR PBB SHADOW E&M-EST. PATIENT-LVL V: CPT | Mod: PBBFAC,HCNC,, | Performed by: INTERNAL MEDICINE

## 2019-08-27 PROCEDURE — 1101F PR PT FALLS ASSESS DOC 0-1 FALLS W/OUT INJ PAST YR: ICD-10-PCS | Mod: HCNC,CPTII,S$GLB, | Performed by: INTERNAL MEDICINE

## 2019-08-27 PROCEDURE — 73562 XR KNEE ORTHO RIGHT: ICD-10-PCS | Mod: 26,HCNC,RT, | Performed by: RADIOLOGY

## 2019-08-27 PROCEDURE — 73560 X-RAY EXAM OF KNEE 1 OR 2: CPT | Mod: 59,TC,HCNC,LT

## 2019-08-27 PROCEDURE — 3075F PR MOST RECENT SYSTOLIC BLOOD PRESS GE 130-139MM HG: ICD-10-PCS | Mod: HCNC,CPTII,S$GLB, | Performed by: INTERNAL MEDICINE

## 2019-08-27 NOTE — PROGRESS NOTES
Subjective:      Patient ID: Myesha Quinones is a 79 y.o. female.    Chief Complaint: Knee Pain    HPI:  Knee Pain    Incident onset: Started Friday a week ago while walking at [facility]. There was no injury mechanism. Pain location: Right knee and behind the right knee. The pain has been constant (The morning is not bad but as she walks on it itt worsens, 3-4 to 7-8/10) since onset. The symptoms are aggravated by weight bearing. Treatments tried: Icee hot. Improvement on treatment: About 15 years ago had injections, with Mr. Pedro Belle.     Patient Active Problem List   Diagnosis    History of nonmelanoma skin cancer    Nonischemic cardiomyopathy    LBBB (left bundle branch block)    Nontoxic multinodular goiter    Meningioma    Asthma, chronic    Biventricular ICD (implantable cardioverter-defibrillator) in place    Tremor    Coronary artery disease involving native coronary artery without angina pectoris - Non-obstructive 50% LAD    Essential hypertension    Hyperlipidemia    History of breast cancer    Adrenal cortical nodule: repeat CT 6/2016    Calcification of aorta    Diabetic polyneuropathy associated with type 2 diabetes mellitus    Osteoporosis    KHOA (obstructive sleep apnea)    Type 2 diabetes mellitus with stage 3 chronic kidney disease, with long-term current use of insulin    Chronic kidney disease, stage 3, mod decreased GFR    Iron deficiency anemia    Chemotherapy-induced neuropathy    Hypomagnesemia    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, with long-term current use of insulin    Hypertensive retinopathy, bilateral    Multiple lung nodules    COPD (chronic obstructive pulmonary disease)    Mixed conductive and sensorineural hearing loss    ICD (implantable cardioverter-defibrillator) battery depletion    Type 2 DM with CKD stage 3 and hypertension    Cardiomyopathy, ischemic     Metabolic bone disease    Proteinuria    Seasonal allergies     Past Medical History:   Diagnosis Date    Allergy     Asthma     Basal cell carcinoma     left forehead    Basal cell carcinoma     left nose    Basal cell carcinoma 05/20/2015    right nose    Basal cell carcinoma 12/22/2015    left lower post neck    Breast cancer     CAD (coronary artery disease)     Cardiomyopathy     Cardiomyopathy, ischemic     Cataract     CHF (congestive heart failure)     Chronic kidney disease, stage 3, mod decreased GFR 2/14/2017    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, with long-term current use of insulin 2/22/2018    COPD (chronic obstructive pulmonary disease)     COPD exacerbation 4/8/2018    Defibrillator discharge     Diabetes mellitus     Diabetes mellitus type II     Diabetes with neurologic complications     Goiter     MNG    HX: breast cancer     Hyperlipidemia     Hypertension     Iron deficiency anemia 5/16/2017    Left kidney mass     Meningioma     Osteoporosis, postmenopausal     Pacemaker     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    Skin cancer     s/p excision    Sleep apnea     CPAP    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/2015    mid forehead    Unspecified vitamin D deficiency     Ventricular tachycardia     Vitamin B12 deficiency     Vitamin D deficiency disease      Past Surgical History:   Procedure Laterality Date    BASAL CELL CARCINOMA EXCISION      posterior neck and nose    BREAST BIOPSY      BREAST CYST EXCISION Left     BREAST SURGERY      CARDIAC DEFIBRILLATOR PLACEMENT      x 2    CATARACT EXTRACTION W/  INTRAOCULAR LENS IMPLANT Bilateral     CHOLECYSTECTOMY      Colonoscopy  N/A 8/27/2014    Performed by Leonidas Velasquez [facility] at [facility] (4[facility] F[facility])    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    FRACTURE SURGERY      left elbow and wrist as a child    HYSTERECTOMY    "   MASTECTOMY Right     REPLACEMENT, ICD GENERATOR N/A 12/17/2018    Performed by Jan [facility]keon MD at [facility]    Revision, Skin Pocket, For Cardioverter-Defibrillator  12/17/2018    Performed by [facility] Mckeon MD at [facility]    SQUAMOUS CELL CARCINOMA EXCISION      remved from forehead    TONSILLECTOMY       Family History   Problem Relation Age of Onset    Diabetes Father     Heart disease Father     Diabetes Sister     Heart disease Sister     Diabetes Brother     Heart disease Brother     Hypertension Brother     Diabetes Brother     Heart disease Brother     Hypertension Brother     Diabetes Brother     Heart disease Brother     Cancer Brother         colon    Diabetes Brother     Cancer Son         skin    Diabetes Son         prediabetes    Diabetes Daughter         prediabetes    Cancer Daughter         melanoma    Obesity Daughter     Melanoma Daughter     Diabetes Son     Asthma Mother     Hypertension Mother     Stroke Mother     No Known Problems Maternal Grandmother     No Known Problems Maternal Grandfather     No Known Problems Paternal Grandmother     No Known Problems Paternal Grandfather     Amblyopia Neg Hx     Blindness Neg Hx     Cataracts Neg Hx     Glaucoma Neg Hx     Macular degeneration Neg Hx     Retinal detachment Neg Hx     Strabismus Neg Hx     Thyroid disease Neg Hx      Review of Systems   Constitutional: Negative for chills, fever and unexpected weight change.   HENT: Negative for trouble swallowing.    Respiratory: Negative for cough, shortness of breath and wheezing.    Cardiovascular: Negative for chest pain and palpitations.   Gastrointestinal: Negative for abdominal distention, abdominal pain, blood in stool and vomiting.   Musculoskeletal: Negative for back pain.     Objective:     Vitals:    08/27/19 0746   BP: (!) 140/60   Pulse: 85   SpO2: 97%   Weight: 72.7 kg (160 lb 4.4 oz)   Height: 5' 3" (1.6 m)   PainSc:   4     Body mass " index is 28.39 kg/m².  Physical Exam   Constitutional: She appears well-developed and well-nourished.   Neck: No JVD present. No thyromegaly present.   Cardiovascular: Normal rate, normal heart sounds and intact distal pulses.   Pulmonary/Chest: Effort normal and breath sounds normal. No respiratory distress.   Musculoskeletal:   Right knee swollen  Some enlargement of the right leg compared to the left  No redness     Assessment:     1. Acute pain of right knee    2. Right leg swelling    3. Edema, unspecified type       Plan:   Myesha was seen today for knee pain.    Diagnoses and all orders for this visit:    Acute pain of right knee  Comments:  No injury, history of injection, unable to take NSAIDs secondary to kidney disease  Orders:  -     US Lower Extremity Veins Right; Future  -     X-ray Knee Ortho Right; Future  -     Ambulatory consult to Orthopedics    Right leg swelling  Comments:  New likely related to knee but will get ultrasound  Orders:  -     US Lower Extremity Veins Right; Future  -     Ambulatory consult to Orthopedics    Edema, unspecified type   Comments:  as above  Orders:  -     US Lower Extremity Veins Right; Future        Problem List Items Addressed This Visit     None      Visit Diagnoses     Acute pain of right knee    -  Primary    No injury, history of injection, unable to take NSAIDs secondary to kidney disease    Relevant Orders    US Lower Extremity Veins Right    X-ray Knee Ortho Right    Ambulatory consult to Orthopedics    Right leg swelling        New likely related to knee but will get ultrasound    Relevant Orders    US Lower Extremity Veins Right    Ambulatory consult to Orthopedics    Edema, unspecified type         as above    Relevant Orders    US Lower Extremity Veins Right        Orders Placed This Encounter   Procedures    US Lower Extremity Veins Right     Feels full behind the knee and patient has been traveling     Standing Status:   Future     Standing  Expiration Date:   8/27/2020     Order Specific Question:   Reason for Exam:     Answer:   Behind the knee pain    X-ray Knee Ortho Right     Standing Status:   Future     Standing Expiration Date:   8/27/2020     Order Specific Question:   May the Radiologist modify the order per protocol to meet the clinical needs of the patient?     Answer:   Yes    Ambulatory consult to Orthopedics     Referral Priority:   Routine     Referral Type:   Consultation     Number of Visits Requested:   1     No follow-ups on file.     Medication List           Accurate as of 8/27/19  8:11 AM. If you have any questions, ask your nurse or doctor.               CHANGE how you take these medications    blood sugar diagnostic Strp  Commonly known as:  ACCU-CHEK HECTOR  Uses Accu-Check Hector meter to test BG 4x/day  What changed:  additional instructions     insulin glargine 100 units/mL (3mL) SubQ pen  Commonly known as:  LANTUS SOLOSTAR U-100 INSULIN  Inject 30 Units into the skin every evening.  What changed:  how much to take     metFORMIN 500 MG tablet  Commonly known as:  GLUCOPHAGE  Take 2 tablets (1,000 mg total) by mouth 2 (two) times daily with meals.  What changed:    · how much to take  · additional instructions        CONTINUE taking these medications    ACCU-CHEK HECTOR CONTROL SOLN Soln  Generic drug:  blood glucose control high,low     ACCU-CHEK HECTOR PLUS METER Misc  Generic drug:  blood-glucose meter     albuterol-ipratropium 2.5 mg-0.5 mg/3 mL nebulizer solution  Commonly known as:  DUO-NEB  TAKE 1 VIAL BY NEBULIZATION EVERY 4 (FOUR) HOURS. RESCUE     amoxicillin-clavulanate 500-125mg 500-125 mg Tab  Commonly known as:  AUGMENTIN  Take 1 tablet (500 mg total) by mouth 2 (two) times daily. Reduced for renal function     azelastine 137 mcg (0.1 %) nasal spray  Commonly known as:  ASTELIN  1 spray (137 mcg total) by Nasal route 2 (two) times daily. For severe allergy     B-12 DOTS ORAL     benzonatate 100 MG  "capsule  Commonly known as:  TESSALON PERLES  Take 2 capsules (200 mg total) by mouth 3 (three) times daily as needed for Cough.     carvedilol 25 MG tablet  Commonly known as:  COREG  TAKE 2 TABLETS (50 MG TOTAL) BY MOUTH 2 (TWO) TIMES DAILY.     chlorthalidone 25 MG Tab  Commonly known as:  HYGROTEN  Take 1 tablet (25 mg total) by mouth once daily.     cloNIDine 0.1 mg/24 hr td ptwk 0.1 mg/24 hr  Commonly known as:  CATAPRES  Place 1 patch onto the skin every 7 days.     diltiaZEM 120 MG Cp24  Commonly known as:  CARDIZEM CD  Take 1 capsule (120 mg total) by mouth once daily.     ENTERIC COATED ASPIRIN 81 MG EC tablet  Generic drug:  aspirin     FISH  mg Cap  Generic drug:  omega-3 fatty acids     fluticasone propionate 50 mcg/actuation nasal spray  Commonly known as:  FLONASE  2 sprays (100 mcg total) by Each Nare route once daily.     hydrALAZINE 100 MG tablet  Commonly known as:  APRESOLINE  TAKE 1 TABLET (100 MG TOTAL) BY MOUTH 3 (THREE) TIMES DAILY.     irbesartan 300 MG tablet  Commonly known as:  AVAPRO  Take 1 tablet (300 mg total) by mouth every evening.     * lancets Misc  Commonly known as:  ACCU-CHEK SOFTCLIX LANCETS  Uses Accu-Chek Angelica meter to test BG 4x/day     * lancets 30 gauge Misc     lancing device Misc     levocetirizine 5 MG tablet  Commonly known as:  XYZAL  Take 1 tablet (5 mg total) by mouth every evening.     magnesium oxide 400 mg (241.3 mg magnesium) tablet  Commonly known as:  MAG-OX  Take 1 tablet (400 mg total) by mouth once daily.     nitroGLYCERIN 0.4 MG SL tablet  Commonly known as:  NITROSTAT     pen needle, diabetic 31 gauge x 3/16" Ndle  Commonly known as:  BD ULTRA-FINE MINI PEN NEEDLE  USE WITH LANTUS AT BEDTIME     pravastatin 40 MG tablet  Commonly known as:  PRAVACHOL  Take 1 tablet (40 mg total) by mouth once daily.     TRADJENTA 5 mg Tab tablet  Generic drug:  linaGLIPtin  TAKE 1 TABLET DAILY     VENTOLIN HFA 90 mcg/actuation inhaler  Generic drug:  " albuterol  INHALE 2 PUFFS INTO THE LUNGS EVERY 6 (SIX) HOURS AS NEEDED FOR WHEEZING. RESCUE     VITAMIN D3 2,000 unit Tab  Generic drug:  cholecalciferol (vitamin D3)     WIXELA INHUB 250-50 mcg/dose diskus inhaler  Generic drug:  fluticasone-salmeterol 250-50 mcg/dose  Inhale 1 puff into the lungs 2 (two) times daily. This is a change from one month         * This list has 2 medication(s) that are the same as other medications prescribed for you. Read the directions carefully, and ask your doctor or other care provider to review them with you.

## 2019-08-28 NOTE — TELEPHONE ENCOUNTER
Please tell her that there was no evidence of deep venous thrombosis but there was a Bakers cyst behind the right knee and may be contributing to her pain. Orthopedics will hep.

## 2019-08-30 ENCOUNTER — LAB VISIT (OUTPATIENT)
Dept: LAB | Facility: HOSPITAL | Age: 79
End: 2019-08-30
Attending: INTERNAL MEDICINE
Payer: MEDICARE

## 2019-08-30 DIAGNOSIS — E78.00 PURE HYPERCHOLESTEROLEMIA: ICD-10-CM

## 2019-08-30 LAB
CHOLEST SERPL-MCNC: 134 MG/DL (ref 120–199)
CHOLEST/HDLC SERPL: 2.7 {RATIO} (ref 2–5)
HDLC SERPL-MCNC: 49 MG/DL (ref 40–75)
HDLC SERPL: 36.6 % (ref 20–50)
LDLC SERPL CALC-MCNC: 59.2 MG/DL (ref 63–159)
NONHDLC SERPL-MCNC: 85 MG/DL
TRIGL SERPL-MCNC: 129 MG/DL (ref 30–150)

## 2019-08-30 PROCEDURE — 36415 COLL VENOUS BLD VENIPUNCTURE: CPT | Mod: HCNC,PO

## 2019-08-30 PROCEDURE — 80061 LIPID PANEL: CPT | Mod: HCNC

## 2019-09-03 NOTE — PROGRESS NOTES
"Last 5 Patient Entered Readings                                      Current 30 Day Average: 145/69     Recent Readings 8/26/2019 8/21/2019 8/21/2019 8/20/2019 8/20/2019    SBP (mmHg) 143 150 166 159 146    DBP (mmHg) 61 67 76 70 88    Pulse 78 79 79 79 79        Called to follow-up with Mrs. Stevenson.     Patient reports she has been very busy with taking care of self and . Patient reports she will be better about taking more readings. Patient will take a reading today.     Patient reports she recently hurt her knee walking in [facility]. Patient reports Dr. Gaston has taken x-rays. Patient will see Dr. Graham (orthoepdics) on Thursday. Will follow-up with patient on results from visit next outreach. Patient states, "I think it is arthritis".     Asked patient about any recent dietary changes, patient declines. Patient states, "I know what I need to do". Encouraged patient to get back on track and to reach out if she needs any assistance.           "

## 2019-09-04 ENCOUNTER — HOSPITAL ENCOUNTER (OUTPATIENT)
Dept: ENDOCRINOLOGY | Facility: CLINIC | Age: 79
Discharge: HOME | End: 2019-09-04
Attending: INTERNAL MEDICINE
Payer: MEDICARE

## 2019-09-04 DIAGNOSIS — E04.2 NONTOXIC MULTINODULAR GOITER: ICD-10-CM

## 2019-09-04 PROCEDURE — 76536 US SOFT TISSUE HEAD NECK THYROID: ICD-10-PCS | Mod: HCNC,S$GLB,, | Performed by: INTERNAL MEDICINE

## 2019-09-04 PROCEDURE — 76536 US EXAM OF HEAD AND NECK: CPT | Mod: HCNC,S$GLB,, | Performed by: INTERNAL MEDICINE

## 2019-09-05 ENCOUNTER — PROCEDURE VISIT (OUTPATIENT)
Dept: OPHTHALMOLOGY | Facility: CLINIC | Age: 79
End: 2019-09-05
Attending: OPHTHALMOLOGY
Payer: MEDICARE

## 2019-09-05 ENCOUNTER — OFFICE VISIT (OUTPATIENT)
Dept: ORTHOPEDICS | Facility: CLINIC | Age: 79
End: 2019-09-05
Payer: MEDICARE

## 2019-09-05 VITALS — HEART RATE: 100 BPM | DIASTOLIC BLOOD PRESSURE: 73 MMHG | SYSTOLIC BLOOD PRESSURE: 128 MMHG

## 2019-09-05 VITALS
HEART RATE: 83 BPM | SYSTOLIC BLOOD PRESSURE: 191 MMHG | BODY MASS INDEX: 28.64 KG/M2 | DIASTOLIC BLOOD PRESSURE: 84 MMHG | WEIGHT: 161.63 LBS | HEIGHT: 63 IN

## 2019-09-05 DIAGNOSIS — Z79.4 CONTROLLED TYPE 2 DIABETES MELLITUS WITH BOTH EYES AFFECTED BY MILD NONPROLIFERATIVE RETINOPATHY AND MACULAR EDEMA, WITH LONG-TERM CURRENT USE OF INSULIN: Primary | ICD-10-CM

## 2019-09-05 DIAGNOSIS — E11.3213 CONTROLLED TYPE 2 DIABETES MELLITUS WITH BOTH EYES AFFECTED BY MILD NONPROLIFERATIVE RETINOPATHY AND MACULAR EDEMA, WITH LONG-TERM CURRENT USE OF INSULIN: Primary | ICD-10-CM

## 2019-09-05 DIAGNOSIS — H35.033 HYPERTENSIVE RETINOPATHY, BILATERAL: ICD-10-CM

## 2019-09-05 DIAGNOSIS — M17.11 PRIMARY OSTEOARTHRITIS OF RIGHT KNEE: Primary | ICD-10-CM

## 2019-09-05 PROCEDURE — 3077F SYST BP >= 140 MM HG: CPT | Mod: HCNC,CPTII,S$GLB, | Performed by: PHYSICIAN ASSISTANT

## 2019-09-05 PROCEDURE — 92134 CPTRZ OPH DX IMG PST SGM RTA: CPT | Mod: HCNC,S$GLB,, | Performed by: OPHTHALMOLOGY

## 2019-09-05 PROCEDURE — 99203 PR OFFICE/OUTPT VISIT, NEW, LEVL III, 30-44 MIN: ICD-10-PCS | Mod: 25,HCNC,S$GLB, | Performed by: PHYSICIAN ASSISTANT

## 2019-09-05 PROCEDURE — 99999 PR PBB SHADOW E&M-EST. PATIENT-LVL IV: CPT | Mod: PBBFAC,HCNC,, | Performed by: PHYSICIAN ASSISTANT

## 2019-09-05 PROCEDURE — 99999 PR PBB SHADOW E&M-EST. PATIENT-LVL IV: ICD-10-PCS | Mod: PBBFAC,HCNC,, | Performed by: PHYSICIAN ASSISTANT

## 2019-09-05 PROCEDURE — 67028 INJECTION EYE DRUG: CPT | Mod: HCNC,LT,S$GLB, | Performed by: OPHTHALMOLOGY

## 2019-09-05 PROCEDURE — 3077F PR MOST RECENT SYSTOLIC BLOOD PRESSURE >= 140 MM HG: ICD-10-PCS | Mod: HCNC,CPTII,S$GLB, | Performed by: PHYSICIAN ASSISTANT

## 2019-09-05 PROCEDURE — 1101F PR PT FALLS ASSESS DOC 0-1 FALLS W/OUT INJ PAST YR: ICD-10-PCS | Mod: HCNC,CPTII,S$GLB, | Performed by: PHYSICIAN ASSISTANT

## 2019-09-05 PROCEDURE — 92014 COMPRE OPH EXAM EST PT 1/>: CPT | Mod: 25,HCNC,S$GLB, | Performed by: OPHTHALMOLOGY

## 2019-09-05 PROCEDURE — 92134 POSTERIOR SEGMENT OCT RETINA (OCULAR COHERENCE TOMOGRAPHY)-BOTH EYES: ICD-10-PCS | Mod: HCNC,S$GLB,, | Performed by: OPHTHALMOLOGY

## 2019-09-05 PROCEDURE — 3079F PR MOST RECENT DIASTOLIC BLOOD PRESSURE 80-89 MM HG: ICD-10-PCS | Mod: HCNC,CPTII,S$GLB, | Performed by: PHYSICIAN ASSISTANT

## 2019-09-05 PROCEDURE — 92014 PR EYE EXAM, EST PATIENT,COMPREHESV: ICD-10-PCS | Mod: 25,HCNC,S$GLB, | Performed by: OPHTHALMOLOGY

## 2019-09-05 PROCEDURE — 20610 DRAIN/INJ JOINT/BURSA W/O US: CPT | Mod: HCNC,RT,S$GLB, | Performed by: PHYSICIAN ASSISTANT

## 2019-09-05 PROCEDURE — 67028 PR INJECT INTRAVITREAL PHARMCOLOGIC: ICD-10-PCS | Mod: HCNC,LT,S$GLB, | Performed by: OPHTHALMOLOGY

## 2019-09-05 PROCEDURE — 1101F PT FALLS ASSESS-DOCD LE1/YR: CPT | Mod: HCNC,CPTII,S$GLB, | Performed by: PHYSICIAN ASSISTANT

## 2019-09-05 PROCEDURE — 99203 OFFICE O/P NEW LOW 30 MIN: CPT | Mod: 25,HCNC,S$GLB, | Performed by: PHYSICIAN ASSISTANT

## 2019-09-05 PROCEDURE — 20610 PR DRAIN/INJECT LARGE JOINT/BURSA: ICD-10-PCS | Mod: HCNC,RT,S$GLB, | Performed by: PHYSICIAN ASSISTANT

## 2019-09-05 PROCEDURE — 3079F DIAST BP 80-89 MM HG: CPT | Mod: HCNC,CPTII,S$GLB, | Performed by: PHYSICIAN ASSISTANT

## 2019-09-05 RX ORDER — METHYLPREDNISOLONE ACETATE 80 MG/ML
80 INJECTION, SUSPENSION INTRA-ARTICULAR; INTRALESIONAL; INTRAMUSCULAR; SOFT TISSUE
Status: COMPLETED | OUTPATIENT
Start: 2019-09-05 | End: 2019-09-05

## 2019-09-05 RX ADMIN — METHYLPREDNISOLONE ACETATE 80 MG: 80 INJECTION, SUSPENSION INTRA-ARTICULAR; INTRALESIONAL; INTRAMUSCULAR; SOFT TISSUE at 09:09

## 2019-09-05 NOTE — PATIENT INSTRUCTIONS
..  Dexamethasone intravitreal implant  What is this medicine?  DEXAMETHASONE (dex a METH a sone) is a corticosteroid. The implant is used to treat macular edema. This is a condition where fluid collects in the eye causing swelling. It is also used to treat swelling in the eye that is not caused by an infection. The implant will only treat the eye that it has been placed into.  How should I use this medicine?  This medicine is placed in position by a surgical procedure. It is done by a trained surgeon in a hospital or clinic setting.  Talk to your pediatrician regarding the use of this medicine in children. Special care may be needed.  What side effects may I notice from receiving this medicine?  Side effects that you should report to your doctor or health care professional as soon as possible:  · allergic reactions like skin rash, itching or hives, swelling of the face, lips, or tongue  · bleeding in the eye  · eye pain  · prolonged changes in vision  · redness of the eye  · sensitivity to light  Side effects that usually do not require medical attention (Report these to your doctor or health care professional if they continue or are bothersome.):  · headache  · swelling, pain, and inflammation of the eye  · temporary changes in vision  What may interact with this medicine?  Interactions are not expected. Do not use any other eye products without asking your doctor or health care professional.  What if I miss a dose?  This does not apply.  Where should I keep my medicine?  This drug is given in a hospital or clinic and will not be stored at home.  What should I tell my health care provider before I take this medicine?  They need to know if you have any of these conditions:  · any active infection  · glaucoma  · torn posterior lens capsule  · an unusual or allergic reaction to dexamethasone, corticosteroids, other medicines, foods, dyes, or preservatives  · pregnant or trying to get pregnant  · breast-feeding  What  should I watch for while using this medicine?  Visit your doctor or health care professional for regular check ups. Have your eyes checked as directed. If your eyes become red, sensitive to light, or you develop eye pain or changes in vision, you should contact your eye doctor right away.  After the implant is placed in your eye, your vision may be blurry. This change should be for only a short time while you heal from the eye surgery. Do not drive or operate machinery until your vision returns to normal.  NOTE:This sheet is a summary. It may not cover all possible information. If you have questions about this medicine, talk to your doctor, pharmacist, or health care provider. Copyright© 2017 [employer]    .Please rinse the eye/eyes with artificial tears as needed during the first 1-2 for any irritation , burning, itching, or grittiness you may be experiencing. It is normal to see a small dash line or sausage like figure in your upper vision area which you may see for a few days or up to a week. Please call our office immediately before your next scheduled appointment if you are experiencing any pain , pressure or vision decrease . The phone number to call can be found on your appointment slip.

## 2019-09-05 NOTE — PROGRESS NOTES
"HPI     DLS: 4/25/2019 Dr. Arechiga     Patient states her vision has been stable since her last visit.     No gtts       OCT - OD No ME  OS - central decrease    Prior FA - Foveal MA's OS   No NV of NP OU      A/P    1. 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 DME OS  S/p Avastin OS x 5  S/p Ozurdex OS x 4 4/19  Foveal MA"s OS - may need chronic pharmacotherapy    Doing well, will need chronic steroid    Ozurdex OS today    Iluvien OS in 2 weeks    3. HTN Ret OU  BS/BP/chol control    4. PCIOL OU  With small PCO      2 weeks iluvien OS only    Risks, benefits, and alternatives to treatment discussed in detail with the patient.  The patient voiced understanding and wished to proceed with the procedure    Injection Procedure Note:  Diagnosis: DME OS    Patient Identified and Time Out complete  Topical Proparacaine and Betadine. subconj lido OS  Inject Ozurdex OS at 6:00 @ 3.5-4mm posterior to limbus  Post Operative Dx: Same  Complications: None  Follow up as above.    "

## 2019-09-05 NOTE — LETTER
September 5, 2019      Emelina Gaston MD  [address]  [facility] [address]           [facility] - Orthopedics  [address]  [facility] [address]  Phone: [phone]          Patient: Myesha Quinones   MR Number: [id number]   YOB: 1940   Date of Visit: 9/5/2019       Dear Dr. Emelina Gaston:    Thank you for referring Myesha Quinones to me for evaluation. Attached you will find relevant portions of my assessment and plan of care.    If you have questions, please do not hesitate to call me. I look forward to following Myesha Quinones along with you.    Sincerely,    Sil Graham PA-C    Enclosure  CC:  No Recipients    If you would like to receive this communication electronically, please contact [email or web address] or [phone] to request more information on [facility] access.    For providers and/or their staff who would like to refer a patient to [facility], please contact us through our one-stop-shop provider referral line, [facility], at [phone].    If you feel you have received this communication in error or would no longer like to receive these types of communications, please e-mail [email or web address]

## 2019-09-09 ENCOUNTER — TELEPHONE (OUTPATIENT)
Dept: ENDOCRINOLOGY | Facility: CLINIC | Age: 79
End: 2019-09-09

## 2019-09-09 NOTE — TELEPHONE ENCOUNTER
Called and spoke with patient about her results. Patient nodules  haven't change. Will repeat ultrasound in 2 years.    Patient understands.

## 2019-09-09 NOTE — TELEPHONE ENCOUNTER
Please call the patient to let her know her thyroid nodules haven't changed since the last ultrasound. We will plan to repeat it in about 2 years. Thanks!

## 2019-09-10 ENCOUNTER — OFFICE VISIT (OUTPATIENT)
Dept: OPTOMETRY | Facility: CLINIC | Age: 79
End: 2019-09-10
Payer: COMMERCIAL

## 2019-09-10 DIAGNOSIS — Z13.5 SCREENING FOR GLAUCOMA: ICD-10-CM

## 2019-09-10 DIAGNOSIS — H52.4 PRESBYOPIA: Primary | ICD-10-CM

## 2019-09-10 DIAGNOSIS — E11.3212 MILD NONPROLIFERATIVE DIABETIC RETINOPATHY OF LEFT EYE WITH MACULAR EDEMA ASSOCIATED WITH TYPE 2 DIABETES MELLITUS: ICD-10-CM

## 2019-09-10 PROCEDURE — 92012 INTRM OPH EXAM EST PATIENT: CPT | Mod: S$GLB,,, | Performed by: OPTOMETRIST

## 2019-09-10 PROCEDURE — 92015 PR REFRACTION: ICD-10-PCS | Mod: S$GLB,,, | Performed by: OPTOMETRIST

## 2019-09-10 PROCEDURE — 92012 PR EYE EXAM, EST PATIENT,INTERMED: ICD-10-PCS | Mod: S$GLB,,, | Performed by: OPTOMETRIST

## 2019-09-10 PROCEDURE — 99999 PR PBB SHADOW E&M-EST. PATIENT-LVL II: CPT | Mod: PBBFAC,,, | Performed by: OPTOMETRIST

## 2019-09-10 PROCEDURE — 99999 PR PBB SHADOW E&M-EST. PATIENT-LVL II: ICD-10-PCS | Mod: PBBFAC,,, | Performed by: OPTOMETRIST

## 2019-09-10 PROCEDURE — 92015 DETERMINE REFRACTIVE STATE: CPT | Mod: S$GLB,,, | Performed by: OPTOMETRIST

## 2019-09-10 NOTE — PROGRESS NOTES
HPI     DLS; 9/5/19 surendra --next appt with him in 2 weeks  Pt states her VA is good, pt got a shot in her knee Friday and when she   gets it it messes with her sugar it either get really high or really low   and the she will get some blurry VA, but states it fine right now.   floater in right  Eye from shot   No gtts  LBS: 59  Hemoglobin A1C       Date                     Value               Ref Range             Status                06/17/2019               8.9 (H)             4.0 - 5.6 %           Final                      12/19/2018               7.1 (H)             4.0 - 5.6 %           Final                     08/21/2018               6.8 (H)             4.0 - 5.6 %           Final                    Last edited by Boaz Young, OD on 9/10/2019  9:55 AM. (History)        ROS     Positive for: Endocrine (DM), Eyes (cat surgery OU/injection hx)    Negative for: Constitutional, Gastrointestinal, Neurological, Skin,   Genitourinary, Musculoskeletal, HENT, Cardiovascular, Respiratory,   Psychiatric, Allergic/Imm, Heme/Lymph    Last edited by Boaz Young, OD on 9/10/2019  9:55 AM. (History)        Assessment /Plan     For exam results, see Encounter Report.    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 Mild pco sp pciol ou--pt wishes new spex Rx  2. fam hx glauc  3. DM- with hx NPDR OS/ MAs /exudate in mac/CSME sp inj --see retina notes  4.  ocular migraine Hx  5. DFE deferred since saw Dr Arechiga last week    PLAN:    1. Wrote new spex Rx  2. rtc as sched w Dr Arechiga, and 1 yr to me

## 2019-09-17 ENCOUNTER — IMMUNIZATION (OUTPATIENT)
Dept: INTERNAL MEDICINE | Facility: CLINIC | Age: 79
End: 2019-09-17
Payer: MEDICARE

## 2019-09-17 ENCOUNTER — LAB VISIT (OUTPATIENT)
Dept: LAB | Facility: HOSPITAL | Age: 79
End: 2019-09-17
Attending: INTERNAL MEDICINE
Payer: MEDICARE

## 2019-09-17 ENCOUNTER — OFFICE VISIT (OUTPATIENT)
Dept: INTERNAL MEDICINE | Facility: CLINIC | Age: 79
End: 2019-09-17
Payer: MEDICARE

## 2019-09-17 VITALS
OXYGEN SATURATION: 96 % | DIASTOLIC BLOOD PRESSURE: 52 MMHG | WEIGHT: 157.19 LBS | BODY MASS INDEX: 27.85 KG/M2 | HEIGHT: 63 IN | SYSTOLIC BLOOD PRESSURE: 122 MMHG | HEART RATE: 80 BPM

## 2019-09-17 DIAGNOSIS — Z79.4 TYPE 2 DIABETES MELLITUS WITH STAGE 3 CHRONIC KIDNEY DISEASE, WITH LONG-TERM CURRENT USE OF INSULIN: ICD-10-CM

## 2019-09-17 DIAGNOSIS — G47.33 OSA (OBSTRUCTIVE SLEEP APNEA): ICD-10-CM

## 2019-09-17 DIAGNOSIS — E11.22 TYPE 2 DIABETES MELLITUS WITH STAGE 3 CHRONIC KIDNEY DISEASE, WITH LONG-TERM CURRENT USE OF INSULIN: Primary | ICD-10-CM

## 2019-09-17 DIAGNOSIS — Z79.4 CONTROLLED TYPE 2 DIABETES MELLITUS WITH BOTH EYES AFFECTED BY MILD NONPROLIFERATIVE RETINOPATHY AND MACULAR EDEMA, WITH LONG-TERM CURRENT USE OF INSULIN: ICD-10-CM

## 2019-09-17 DIAGNOSIS — Z12.11 COLON CANCER SCREENING: ICD-10-CM

## 2019-09-17 DIAGNOSIS — I10 ESSENTIAL HYPERTENSION: ICD-10-CM

## 2019-09-17 DIAGNOSIS — N18.30 TYPE 2 DIABETES MELLITUS WITH STAGE 3 CHRONIC KIDNEY DISEASE, WITH LONG-TERM CURRENT USE OF INSULIN: ICD-10-CM

## 2019-09-17 DIAGNOSIS — N18.30 TYPE 2 DIABETES MELLITUS WITH STAGE 3 CHRONIC KIDNEY DISEASE, WITH LONG-TERM CURRENT USE OF INSULIN: Primary | ICD-10-CM

## 2019-09-17 DIAGNOSIS — Z79.4 TYPE 2 DIABETES MELLITUS WITH STAGE 3 CHRONIC KIDNEY DISEASE, WITH LONG-TERM CURRENT USE OF INSULIN: Primary | ICD-10-CM

## 2019-09-17 DIAGNOSIS — Z95.810 BIVENTRICULAR ICD (IMPLANTABLE CARDIOVERTER-DEFIBRILLATOR) IN PLACE: ICD-10-CM

## 2019-09-17 DIAGNOSIS — N18.30 CHRONIC KIDNEY DISEASE, STAGE 3, MOD DECREASED GFR: ICD-10-CM

## 2019-09-17 DIAGNOSIS — E78.5 HYPERLIPIDEMIA, UNSPECIFIED HYPERLIPIDEMIA TYPE: ICD-10-CM

## 2019-09-17 DIAGNOSIS — E11.3213 CONTROLLED TYPE 2 DIABETES MELLITUS WITH BOTH EYES AFFECTED BY MILD NONPROLIFERATIVE RETINOPATHY AND MACULAR EDEMA, WITH LONG-TERM CURRENT USE OF INSULIN: ICD-10-CM

## 2019-09-17 DIAGNOSIS — J45.20 MILD INTERMITTENT CHRONIC ASTHMA WITHOUT COMPLICATION: ICD-10-CM

## 2019-09-17 DIAGNOSIS — I25.10 CORONARY ARTERY DISEASE INVOLVING NATIVE CORONARY ARTERY OF NATIVE HEART WITHOUT ANGINA PECTORIS: ICD-10-CM

## 2019-09-17 DIAGNOSIS — E55.9 MILD VITAMIN D DEFICIENCY: ICD-10-CM

## 2019-09-17 DIAGNOSIS — E11.22 TYPE 2 DIABETES MELLITUS WITH STAGE 3 CHRONIC KIDNEY DISEASE, WITH LONG-TERM CURRENT USE OF INSULIN: ICD-10-CM

## 2019-09-17 LAB
ESTIMATED AVG GLUCOSE: 194 MG/DL (ref 68–131)
HBA1C MFR BLD HPLC: 8.4 % (ref 4–5.6)

## 2019-09-17 PROCEDURE — 3074F SYST BP LT 130 MM HG: CPT | Mod: HCNC,CPTII,S$GLB, | Performed by: INTERNAL MEDICINE

## 2019-09-17 PROCEDURE — 99214 PR OFFICE/OUTPT VISIT, EST, LEVL IV, 30-39 MIN: ICD-10-PCS | Mod: HCNC,25,S$GLB, | Performed by: INTERNAL MEDICINE

## 2019-09-17 PROCEDURE — 99214 OFFICE O/P EST MOD 30 MIN: CPT | Mod: HCNC,25,S$GLB, | Performed by: INTERNAL MEDICINE

## 2019-09-17 PROCEDURE — 3078F DIAST BP <80 MM HG: CPT | Mod: HCNC,CPTII,S$GLB, | Performed by: INTERNAL MEDICINE

## 2019-09-17 PROCEDURE — 90662 FLU VACCINE - HIGH DOSE (65+) PRESERVATIVE FREE IM: ICD-10-PCS | Mod: HCNC,S$GLB,, | Performed by: INTERNAL MEDICINE

## 2019-09-17 PROCEDURE — 3074F PR MOST RECENT SYSTOLIC BLOOD PRESSURE < 130 MM HG: ICD-10-PCS | Mod: HCNC,CPTII,S$GLB, | Performed by: INTERNAL MEDICINE

## 2019-09-17 PROCEDURE — 99999 PR PBB SHADOW E&M-EST. PATIENT-LVL III: ICD-10-PCS | Mod: PBBFAC,HCNC,, | Performed by: INTERNAL MEDICINE

## 2019-09-17 PROCEDURE — 83036 HEMOGLOBIN GLYCOSYLATED A1C: CPT | Mod: HCNC

## 2019-09-17 PROCEDURE — G0008 FLU VACCINE - HIGH DOSE (65+) PRESERVATIVE FREE IM: ICD-10-PCS | Mod: HCNC,S$GLB,, | Performed by: INTERNAL MEDICINE

## 2019-09-17 PROCEDURE — 90662 IIV NO PRSV INCREASED AG IM: CPT | Mod: HCNC,S$GLB,, | Performed by: INTERNAL MEDICINE

## 2019-09-17 PROCEDURE — 1101F PT FALLS ASSESS-DOCD LE1/YR: CPT | Mod: HCNC,CPTII,S$GLB, | Performed by: INTERNAL MEDICINE

## 2019-09-17 PROCEDURE — 1101F PR PT FALLS ASSESS DOC 0-1 FALLS W/OUT INJ PAST YR: ICD-10-PCS | Mod: HCNC,CPTII,S$GLB, | Performed by: INTERNAL MEDICINE

## 2019-09-17 PROCEDURE — 3078F PR MOST RECENT DIASTOLIC BLOOD PRESSURE < 80 MM HG: ICD-10-PCS | Mod: HCNC,CPTII,S$GLB, | Performed by: INTERNAL MEDICINE

## 2019-09-17 PROCEDURE — G0008 ADMIN INFLUENZA VIRUS VAC: HCPCS | Mod: HCNC,S$GLB,, | Performed by: INTERNAL MEDICINE

## 2019-09-17 PROCEDURE — 36415 COLL VENOUS BLD VENIPUNCTURE: CPT | Mod: HCNC

## 2019-09-17 PROCEDURE — 99999 PR PBB SHADOW E&M-EST. PATIENT-LVL III: CPT | Mod: PBBFAC,HCNC,, | Performed by: INTERNAL MEDICINE

## 2019-09-17 NOTE — PROGRESS NOTES
Subjective:      Patient ID: Myesha Quinones is a 79 y.o. female.    Chief Complaint: Follow-up    HPI:  HPI   The patient is seen every 3-4 months for multiple medical problems of a complex nature to help her with her appointments in thinking about her problems. Since the last appointment she has had an appointment with her retinologist.  She also has had an appointment with Orthopedics and received a steroid injection to her knee which has helped but has increased her glucose is on home monitoring.  The monitoring over the last week has significantly improved to a range that would be acceptable.  This is of concern is the patient's last A1c was 8.9 and she was doing better.  Because of a recent decline in kidney function and also concerns about proteinuria her metformin was decreased to 1 twice a day.  Her A1c has stabilized and she does have a follow-up in Nephrology.    The expense of her medicines have been challenging and she is receiving her insulin through the pharmacy program.    Patient recently has found that she has difficulty sleeping and I have discussed melatonin with her.    Diabetes Management Status    Statin: Taking  ACE/ARB: Taking    Screening or Prevention Patient's value Goal Complete/Controlled?   HgA1C Testing and Control   Lab Results   Component Value Date    HGBA1C 8.9 (H) 06/17/2019      Annually/Less than 8% No   Lipid profile : 08/30/2019 Annually Yes   LDL control Lab Results   Component Value Date    LDLCALC 59.2 (L) 08/30/2019    Annually/Less than 100 mg/dl  Yes   Nephropathy screening Lab Results   Component Value Date    LABMICR 335.0 01/21/2019     Lab Results   Component Value Date    PROTEINUA 1+ (A) 03/27/2019    Annually Yes   Blood pressure BP Readings from Last 1 Encounters:   09/17/19 (!) 122/52    Less than 140/90 Yes   Dilated retinal exam : 09/10/2019 Annually Yes   Foot exam   : 01/28/2019 Annually Yes       Patient Active Problem List   Diagnosis    History of  nonmelanoma skin cancer    Nonischemic cardiomyopathy    LBBB (left bundle branch block)    Nontoxic multinodular goiter    Meningioma    Asthma, chronic    Biventricular ICD (implantable cardioverter-defibrillator) in place    Tremor    Coronary artery disease involving native coronary artery without angina pectoris - Non-obstructive 50% LAD    Essential hypertension    Hyperlipidemia    History of breast cancer    Adrenal cortical nodule: repeat CT 6/2016    Calcification of aorta    Diabetic polyneuropathy associated with type 2 diabetes mellitus    Osteoporosis    KHOA (obstructive sleep apnea)    Type 2 diabetes mellitus with stage 3 chronic kidney disease, with long-term current use of insulin    Chronic kidney disease, stage 3, mod decreased GFR    Iron deficiency anemia    Chemotherapy-induced neuropathy    Hypomagnesemia    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, with long-term current use of insulin    Hypertensive retinopathy, bilateral    Multiple lung nodules    COPD (chronic obstructive pulmonary disease)    Mixed conductive and sensorineural hearing loss    ICD (implantable cardioverter-defibrillator) battery depletion    Type 2 DM with CKD stage 3 and hypertension    Cardiomyopathy, ischemic    Metabolic bone disease    Proteinuria    Seasonal allergies     Past Medical History:   Diagnosis Date    Allergy     Asthma     Basal cell carcinoma     left forehead    Basal cell carcinoma     left nose    Basal cell carcinoma 05/20/2015    right nose    Basal cell carcinoma 12/22/2015    left lower post neck    Breast cancer     CAD (coronary artery disease)     Cardiomyopathy     Cardiomyopathy, ischemic     Cataract     CHF (congestive heart failure)     Chronic kidney disease, stage 3, mod decreased GFR 2/14/2017    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, with long-term current use of insulin 2/22/2018    COPD (chronic obstructive pulmonary disease)     COPD exacerbation 4/8/2018    Defibrillator discharge     Diabetes mellitus     Diabetes mellitus type II     Diabetes with neurologic complications     Goiter     MNG    HX: breast cancer     Hyperlipidemia     Hypertension     Iron deficiency anemia 5/16/2017    Left kidney mass     Meningioma     Osteoporosis, postmenopausal     Pacemaker     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    Skin cancer     s/p excision    Sleep apnea     CPAP    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/2015    mid forehead    Unspecified vitamin D deficiency     Ventricular tachycardia     Vitamin B12 deficiency     Vitamin D deficiency disease      Past Surgical History:   Procedure Laterality Date    BASAL CELL CARCINOMA EXCISION      posterior neck and nose    BREAST BIOPSY      BREAST CYST EXCISION Left     BREAST SURGERY      CARDIAC DEFIBRILLATOR PLACEMENT      x 2    CATARACT EXTRACTION W/  INTRAOCULAR LENS IMPLANT Bilateral     CHOLECYSTECTOMY      Colonoscopy  N/A 8/27/2014    Performed by Leonidas Velasquez MD at [facility] (4TH FLR)    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    FRACTURE SURGERY      left elbow and wrist as a child    HYSTERECTOMY      MASTECTOMY Right     REPLACEMENT, ICD GENERATOR N/A 12/17/2018    Performed by Jan Mckeon MD at [facility]    Revision, Skin Pocket, For Cardioverter-Defibrillator  12/17/2018    Performed by Jan Mckeon MD at [facility]    SQUAMOUS CELL CARCINOMA EXCISION      remved from forehead    TONSILLECTOMY       Family History   Problem Relation Age of Onset    Diabetes Father     Heart disease Father     Diabetes Sister     Heart disease Sister     Diabetes Brother     Heart disease Brother     Hypertension Brother     Diabetes Brother     Heart disease Brother     Hypertension Brother      "Diabetes Brother     Heart disease Brother     Cancer Brother         colon    Diabetes Brother     Cancer Son         skin    Diabetes Son         prediabetes    Diabetes Daughter         prediabetes    Cancer Daughter         melanoma    Obesity Daughter     Melanoma Daughter     Diabetes Son     Asthma Mother     Hypertension Mother     Stroke Mother     No Known Problems Maternal Grandmother     No Known Problems Maternal Grandfather     No Known Problems Paternal Grandmother     No Known Problems Paternal Grandfather     Amblyopia Neg Hx     Blindness Neg Hx     Cataracts Neg Hx     Glaucoma Neg Hx     Macular degeneration Neg Hx     Retinal detachment Neg Hx     Strabismus Neg Hx     Thyroid disease Neg Hx      Review of Systems   Constitutional: Negative for chills, fever and unexpected weight change.   HENT: Negative for trouble swallowing.    Respiratory: Positive for cough and wheezing. Negative for shortness of breath.         Discussed in this is chronic   Cardiovascular: Negative for chest pain and palpitations.   Gastrointestinal: Negative for abdominal distention, abdominal pain, blood in stool and vomiting.   Musculoskeletal: Negative for back pain.     Objective:     Vitals:    09/17/19 0742   BP: (!) 122/52   Pulse: 80   SpO2: 96%   Weight: 71.3 kg (157 lb 3 oz)   Height: 5' 3" (1.6 m)   PainSc: 0-No pain     Body mass index is 27.84 kg/m².  Physical Exam   Constitutional: She is oriented to person, place, and time. She appears well-developed and well-nourished. No distress.   Neck: Carotid bruit is not present. No thyromegaly present.   Cardiovascular: Normal rate, regular rhythm and normal heart sounds. PMI is not displaced.   Pulmonary/Chest: Effort normal. No respiratory distress. She has wheezes.   Abdominal: Soft. Bowel sounds are normal. She exhibits no distension. There is no tenderness.   Musculoskeletal: She exhibits no edema.   Neurological: She is alert and " oriented to person, place, and time.     Assessment:     1. Type 2 diabetes mellitus with stage 3 chronic kidney disease, with long-term current use of insulin    2. Mild intermittent chronic asthma without complication    3. Biventricular ICD (implantable cardioverter-defibrillator) in place    4. Chronic kidney disease, stage 3, mod decreased GFR    5. 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, with long-term current use of insulin    6. Coronary artery disease involving native coronary artery of native heart without angina pectoris    7. Essential hypertension    8. Hyperlipidemia, unspecified hyperlipidemia type    9. KHOA (obstructive sleep apnea)    10. Mild vitamin D deficiency    11. Colon cancer screening      Plan:   Myesha was seen today for follow-up.    Diagnoses and all orders for this visit:    Type 2 diabetes mellitus with stage 3 chronic kidney disease, with long-term current use of insulin  Comments:  Recent decrease in her metformin, recent steroids have increased her home glucose readings.  Will continue to monitor every 3-4 months.  Readings are improving  Orders:  -     Hemoglobin A1c; Future  -     Cancel: Case request GI: COLONOSCOPY  -     Hemoglobin A1c; Future    Mild intermittent chronic asthma without complication  Comments:  Stable patient does have home nebulizer    Biventricular ICD (implantable cardioverter-defibrillator) in place  Comments:  Followed in Cardiology    Chronic kidney disease, stage 3, mod decreased GFR  Comments:  Appointment in Nephrology in November    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, with long-term current use of insulin    Coronary artery disease involving native coronary artery of native heart without angina pectoris  Comments:  Stable patient does follow in cardiology    Essential  hypertension  Comments:  Well controlled same meds  Orders:  -     CBC auto differential; Future  -     Comprehensive metabolic panel; Future  -     TSH; Future    Hyperlipidemia, unspecified hyperlipidemia type  Comments:  Well controlled same meds  Orders:  -     Lipid panel; Future    KHOA (obstructive sleep apnea)  Comments:  Uses daily    Mild vitamin D deficiency  Comments:  Follow-up lab needed  Orders:  -     Vitamin D; Future    Colon cancer screening  -     Case request GI: COLONOSCOPY        Problem List Items Addressed This Visit     Asthma, chronic    Overview     Stable on Advair 250/50 BID. Ventolin for rescue and Duo Nebs PRN         Biventricular ICD (implantable cardioverter-defibrillator) in place    Coronary artery disease involving native coronary artery without angina pectoris - Non-obstructive 50% LAD    Essential hypertension    Relevant Orders    CBC auto differential    Comprehensive metabolic panel    TSH    Hyperlipidemia    Relevant Orders    Lipid panel    KHOA (obstructive sleep apnea)    Overview     Stable on CPAP         Type 2 diabetes mellitus with stage 3 chronic kidney disease, with long-term current use of insulin - Primary    Relevant Orders    Hemoglobin A1c    Hemoglobin A1c    Chronic kidney disease, stage 3, mod decreased GFR    Overview     Reduced metformin to 50% of previous as patient has GFR 32 ( 30-45) and is currently on insulin         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, with long-term current use of insulin      Other Visit Diagnoses     Mild vitamin D deficiency        Follow-up lab needed    Relevant Orders    Vitamin D    Colon cancer screening        Relevant Orders    Case request GI: COLONOSCOPY (Completed)        Orders Placed This Encounter   Procedures    Hemoglobin A1c     Standing Status:   Future     Number of Occurrences:   1     Standing Expiration Date:   11/16/2019     CBC auto differential     Standing Status:   Future     Standing Expiration Date:   4/17/2020    Comprehensive metabolic panel     Standing Status:   Future     Standing Expiration Date:   4/17/2020    Hemoglobin A1c     Standing Status:   Future     Standing Expiration Date:   4/17/2020    Lipid panel     Standing Status:   Future     Standing Expiration Date:   4/17/2020    TSH     Standing Status:   Future     Standing Expiration Date:   4/17/2020    Vitamin D     Standing Status:   Future     Standing Expiration Date:   4/17/2020    Case request GI: COLONOSCOPY     Order Specific Question:   Pre-op Diagnosis     Answer:   Screening [024567]     Order Specific Question:   CPT Code:     Answer:   RI COLORECTAL CANCER SCREEN RESULTS DOCUMENT/REVIEW [3017F]     Order Specific Question:   Case Referring Provider     Answer:   MADELEINE LEVI [[id number]]     Order Specific Question:   CPT Code:     Answer:   RI COLON CA SCRN NOT HI RSK IND []     Order Specific Question:   Medical Necessity:     Answer:   Medically Non-Urgent [100]     Follow up in about 4 months (around 1/17/2020) for Follow up.     Medication List           Accurate as of 9/17/19  8:41 AM. If you have any questions, ask your nurse or doctor.               CHANGE how you take these medications    blood sugar diagnostic Strp  Commonly known as:  ACCU-CHEK HECTOR  Uses Accu-Check Hector meter to test BG 4x/day  What changed:  additional instructions     insulin glargine 100 units/mL (3mL) SubQ pen  Commonly known as:  LANTUS SOLOSTAR U-100 INSULIN  Inject 30 Units into the skin every evening.  What changed:  how much to take     metFORMIN 500 MG tablet  Commonly known as:  GLUCOPHAGE  Take 2 tablets (1,000 mg total) by mouth 2 (two) times daily with meals.  What changed:    · how much to take  · additional instructions        CONTINUE taking these medications    ACCU-CHEK HECTOR CONTROL SOLN Soln  Generic drug:  blood glucose control high,low      ACCU-CHEK HECTOR PLUS METER [facility] [place]  Generic drug:  blood-glucose meter     albuterol-ipratropium 2.5 mg-0.5 mg/3 mL nebulizer solution  Commonly known as:  DUO-NEB  TAKE 1 VIAL BY NEBULIZATION EVERY 4 (FOUR) HOURS. RESCUE     azelastine 137 mcg (0.1 %) nasal spray  Commonly known as:  ASTELIN  1 spray (137 mcg total) by Nasal route 2 (two) times daily. For severe allergy     B-12 DOTS ORAL     benzonatate 100 MG capsule  Commonly known as:  TESSALON PERLES  Take 2 capsules (200 mg total) by mouth 3 (three) times daily as needed for Cough.     carvedilol 25 MG tablet  Commonly known as:  COREG  TAKE 2 TABLETS (50 MG TOTAL) BY MOUTH 2 (TWO) TIMES DAILY.     chlorthalidone 25 MG Tab  Commonly known as:  HYGROTEN  Take 1 tablet (25 mg total) by mouth once daily.     cloNIDine 0.1 mg/24 hr td ptwk 0.1 mg/24 hr  Commonly known as:  CATAPRES  Place 1 patch onto the skin every 7 days.     diltiaZEM 120 MG Cp24  Commonly known as:  CARDIZEM CD  Take 1 capsule (120 mg total) by mouth once daily.     ENTERIC COATED ASPIRIN 81 MG EC tablet  Generic drug:  aspirin     FISH  mg Cap  Generic drug:  omega-3 fatty acids     fluticasone propionate 50 mcg/actuation nasal spray  Commonly known as:  FLONASE  2 sprays (100 mcg total) by Each Nare route once daily.     hydrALAZINE 100 MG tablet  Commonly known as:  APRESOLINE  TAKE 1 TABLET (100 MG TOTAL) BY MOUTH 3 (THREE) TIMES DAILY.     irbesartan 300 MG tablet  Commonly known as:  AVAPRO  Take 1 tablet (300 mg total) by mouth every evening.     * lancets Misc  Commonly known as:  ACCU-CHEK SOFTCLIX LANCETS  Uses Accu-Chek Hector meter to test BG 4x/day     * lancets 30 gauge Misc     lancing device Misc     levocetirizine 5 MG tablet  Commonly known as:  XYZAL  Take 1 tablet (5 mg total) by mouth every evening.     magnesium oxide 400 mg (241.3 mg magnesium) tablet  Commonly known as:  MAG-OX  Take 1 tablet (400 mg total) by mouth once daily.     nitroGLYCERIN 0.4 MG SL  "tablet  Commonly known as:  NITROSTAT     pen needle, diabetic 31 gauge x 3/16" Ndle  Commonly known as:  BD ULTRA-FINE MINI PEN NEEDLE  USE WITH LANTUS AT BEDTIME     pravastatin 40 MG tablet  Commonly known as:  PRAVACHOL  Take 1 tablet (40 mg total) by mouth once daily.     TRADJENTA 5 mg Tab tablet  Generic drug:  linaGLIPtin  TAKE 1 TABLET DAILY     VENTOLIN HFA 90 mcg/actuation inhaler  Generic drug:  albuterol  INHALE 2 PUFFS INTO THE LUNGS EVERY 6 (SIX) HOURS AS NEEDED FOR WHEEZING. RESCUE     VITAMIN D3 2,000 unit Tab  Generic drug:  cholecalciferol (vitamin D3)     WIXELA INHUB 250-50 mcg/dose diskus inhaler  Generic drug:  fluticasone-salmeterol 250-50 mcg/dose  Inhale 1 puff into the lungs 2 (two) times daily. This is a change from one month         * This list has 2 medication(s) that are the same as other medications prescribed for you. Read the directions carefully, and ask your doctor or other care provider to review them with you.            STOP taking these medications    amoxicillin-clavulanate 500-125mg 500-125 mg Tab  Commonly known as:  AUGMENTIN  Stopped by:  Emelina Gaston MD            "

## 2019-09-18 ENCOUNTER — TELEPHONE (OUTPATIENT)
Dept: INTERNAL MEDICINE | Facility: CLINIC | Age: 79
End: 2019-09-18

## 2019-09-18 NOTE — TELEPHONE ENCOUNTER
Please tell her that despite the fact that she received a steroid injection and her glucoses were elevated her overall A1c has improved from 8.9 to 8.4 and I expect that if she had not received a steroid injection we would see it closer to the high sevens.

## 2019-09-20 ENCOUNTER — TELEPHONE (OUTPATIENT)
Dept: ENDOSCOPY | Facility: HOSPITAL | Age: 79
End: 2019-09-20

## 2019-09-20 DIAGNOSIS — Z12.11 SCREEN FOR COLON CANCER: Primary | ICD-10-CM

## 2019-09-20 RX ORDER — POLYETHYLENE GLYCOL 3350, SODIUM SULFATE ANHYDROUS, SODIUM BICARBONATE, SODIUM CHLORIDE, POTASSIUM CHLORIDE 236; 22.74; 6.74; 5.86; 2.97 G/4L; G/4L; G/4L; G/4L; G/4L
4 POWDER, FOR SOLUTION ORAL ONCE
Qty: 4000 ML | Refills: 0 | Status: SHIPPED | OUTPATIENT
Start: 2019-09-20 | End: 2019-09-20

## 2019-09-26 ENCOUNTER — PROCEDURE VISIT (OUTPATIENT)
Dept: OPHTHALMOLOGY | Facility: CLINIC | Age: 79
End: 2019-09-26
Payer: MEDICARE

## 2019-09-26 VITALS — SYSTOLIC BLOOD PRESSURE: 198 MMHG | DIASTOLIC BLOOD PRESSURE: 86 MMHG | HEART RATE: 80 BPM

## 2019-09-26 DIAGNOSIS — E11.3213 CONTROLLED TYPE 2 DIABETES MELLITUS WITH BOTH EYES AFFECTED BY MILD NONPROLIFERATIVE RETINOPATHY AND MACULAR EDEMA, WITH LONG-TERM CURRENT USE OF INSULIN: Primary | ICD-10-CM

## 2019-09-26 DIAGNOSIS — Z79.4 CONTROLLED TYPE 2 DIABETES MELLITUS WITH BOTH EYES AFFECTED BY MILD NONPROLIFERATIVE RETINOPATHY AND MACULAR EDEMA, WITH LONG-TERM CURRENT USE OF INSULIN: Primary | ICD-10-CM

## 2019-09-26 PROCEDURE — 99499 NO LOS: ICD-10-PCS | Mod: HCNC,S$GLB,, | Performed by: OPHTHALMOLOGY

## 2019-09-26 PROCEDURE — 67028 INJECTION EYE DRUG: CPT | Mod: HCNC,LT,S$GLB, | Performed by: OPHTHALMOLOGY

## 2019-09-26 PROCEDURE — 99499 UNLISTED E&M SERVICE: CPT | Mod: HCNC,S$GLB,, | Performed by: OPHTHALMOLOGY

## 2019-09-26 PROCEDURE — 67028 PR INJECT INTRAVITREAL PHARMCOLOGIC: ICD-10-PCS | Mod: HCNC,LT,S$GLB, | Performed by: OPHTHALMOLOGY

## 2019-09-26 RX ORDER — POLYETHYLENE GLYCOL-3350 AND ELECTROLYTES 236; 6.74; 5.86; 2.97; 22.74 G/274.31G; G/274.31G; G/274.31G; G/274.31G; G/274.31G
POWDER, FOR SOLUTION ORAL
COMMUNITY
Start: 2019-09-20 | End: 2019-12-07

## 2019-09-26 NOTE — PROGRESS NOTES
"HPI     DLS: 09/05/2019 Dr. Arechiga     Patient states her vision has been stable since her last visit. Denies   flashes, floaters, diplopia, photophobia, glare, HA's, or pain.    No gtts     HPI     DLS: 4/25/2019 Dr. Arechiga     Patient states her vision has been stable since her last visit.     No gtts       OCT - OD No ME  OS - central decrease    Prior FA - Foveal MA's OS   No NV of NP OU      A/P    1. 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 DME OS  S/p Avastin OS x 5  S/p Ozurdex OS x 5 9/19  Foveal MA"s OS - may need chronic pharmacotherapy    Doing well, will need chronic steroid    Iluvien OS today      3. HTN Ret OU  BS/BP/chol control    4. PCIOL OU  With small PCO      6 weeks OCT    Risks, benefits, and alternatives to treatment discussed in detail with the patient.  The patient voiced understanding and wished to proceed with the procedure    Injection Procedure Note:  Diagnosis: DME OS    Patient Identified and Time Out complete  Topical Proparacaine and Betadine. subconj lido OS  Inject iluvien OS at 6:00 @ 3.5-4mm posterior to limbus  Post Operative Dx: Same  Complications: None  Follow up as above.    "

## 2019-10-01 ENCOUNTER — PATIENT OUTREACH (OUTPATIENT)
Dept: OTHER | Facility: OTHER | Age: 79
End: 2019-10-01

## 2019-10-01 NOTE — PROGRESS NOTES
LVM to discuss BP readings and medications.     Last 5 Patient Entered Readings                                      Current 30 Day Average: 131/60     Recent Readings 10/1/2019 9/30/2019 9/25/2019 9/23/2019 9/19/2019    SBP (mmHg) 165 158 126 149 144    DBP (mmHg) 73 72 55 71 70    Pulse 75 77 79 69 78

## 2019-10-08 ENCOUNTER — TELEPHONE (OUTPATIENT)
Dept: NEPHROLOGY | Facility: CLINIC | Age: 79
End: 2019-10-08

## 2019-10-10 NOTE — PROGRESS NOTES
Subjective:      Patient ID: Myesha Quinones is a 79 y.o. female.    Chief Complaint:  Diabetes    History of Present Illness  Myesha Quinones presents today for follow up of type 2 DM. Previous patient of Dr. Yates. Last seen in 1/2019. This is her first visit with me.     With regards to the diabetes:     Current symptoms/problems include none. Her metformin dose was recently decreased from [phone] down to 500 twice daily with breakfast and supper.      The patient was initially diagnosed with Type 2 diabetes mellitus:  1992     Known diabetic complications: CKD and retinopathy  Cardiovascular risk factors: advanced age (older than 55 for men, 65 for women), diabetes mellitus and dyslipidemia    Current diabetic medications include:  Lantus 34 units nightly  Tradjenta 5 mg daily  Metformin 500 mg BID- decreased for kidney function      Prior visit with dietician: yes  Current diet: Was doing well with her diet, but admits she had some indiscretions during the holidays.     Current monitoring regimen: home blood tests - once daily times daily  Oral recall: 130-140's    Any episodes of hypoglycemia? Denies  Knows how to correct with 15 grams of carbs- juice, coke, or a peppermint.     Diabetes Management Status  Statin: Taking  ACE/ARB: Taking  Screening or Prevention Patient's value Goal Complete/Controlled?   HgA1C Testing and Control   Lab Results   Component Value Date    HGBA1C 8.4 (H) 09/17/2019    Annually/Less than 8% No   Lipid profile : 08/30/2019 Annually Yes   LDL control Lab Results   Component Value Date    LDLCALC 59.2 (L) 08/30/2019    Annually/Less than 100 mg/dl  Yes   Nephropathy screening Lab Results   Component Value Date    LABMICR 335.0 01/21/2019     Lab Results   Component Value Date    PROTEINUA 1+ (A) 03/27/2019    Annually Yes   Blood pressure BP Readings from Last 1 Encounters:   10/15/19 (!) 164/72    Less than 140/90 No   Dilated retinal exam : 09/10/2019 Annually Yes    Foot exam   : 01/28/2019 Annually Yes     With regards to the thyroid nodule:  Presents with nodule that was diagnosed by ultrasound     Preexisting thyroid disease?: no     Compressiveymptoms:  Anterior neck pressure?: no  Dysphagia?: no  Voice changes?: no     Risk Factors:  Radiation to head or neck for any treatment of cancer or exposure to radiation?: no  Personal history of colon or breast cancer?: no  Tobacco use?: no  Family history of thyroid cancer?: no     Symptoms of hyper or hypothyroidism:  None     Previous biopsy results:  Left nodule: Benign - 2016  Right nodule: Benign - 2014     Last ultrasound: 9/2019  FINDINGS:  Thyroid gland has heterogeneous echotexture and normal vascularity.    RIGHT LOBE:  Right lobe of the thyroid measures 5.11 x 1.94 x 1.85 cm.    1.35 x 1.28 x 1.30 cm solid, heterogeneous predominately hypoechoic nodule is seen in the right inferior pole.  This nodule has a spongiform appearance.  Margins are well-defined.  No microcalcifications are seen.  Vascularity is grade 1.  1.45 x 1.40 x 1.35 cm solid, isoechoic nodule is seen in the right mid lobe.  Margins are well-defined.  No microcalcifications are seen.  Vascularity is grade 2.    LEFT LOBE:  Left lobe of the thyroid measures 4.95 x 2.45 x 1.55 cm.  1.22 x 0.83 x 1.02 cm solid, heterogeneous predominately isoechoic nodule is seen in the left superior pole.  Margins are blurred.  No microcalcifications are seen.  Vascularity is grade 1.  1.89 x 2.03 x 1.21 cm solid, heterogeneous nodule is seen in the left mid lobe.  Margins are blurred.  This nodule contains multiple macrocystic areas.  No microcalcifications are seen.  Vascularity is grade 1.    ISTHMUS:  Isthmus measures 0.43 cm.    LYMPH NODES:  No abnormal lymph nodes seen.    COMPARISON:  Neck ultrasound dated 05/01/2018.  Our records indicate benign FNA's of the right and left lobe nodules on 4/2014 and 6/2016 respectively.  When compared to the prior study the  "above nodules have not changed significantly in size or features.      Impression     1.)  Thyroid gland is upper limits of normal in size with heterogeneous echotexture and normal vascularity  2.) 1.35 x 1.28 x 1.30 cm solid, heterogeneous predominately hypoechoic nodule is seen in the right inferior pole  3.) 1.45 x 1.40 x 1.35 cm solid, isoechoic nodule is seen in the right mid lobe  4.) 1.22 x 0.83 x 1.02 cm solid, heterogeneous predominately isoechoic nodule is seen in the left superior pole  5.) 1.89 x 2.03 x 1.21 cm solid, heterogeneous nodule is seen in the left mid lobe  RECOMMENDATIONS:  Recommend repeat thyroid ultrasound in 1-2 years.      With regards to the adrenal incidentaloma:     Was found to have bilateral adrenal nodules found incidentally on CT scan "many" years ago. She reports she was initially having yearly CT scans to follow them, but it was recommended to stop doing them due to lack of growth. There are two CT scans in our system (2016 and 2017). The left nodule increased from 1.7 > 2.0 cm and measures -5.7 HU pre-contrast. The right nodule was the same size in the interval with indeterminate density approx. 20 HU.     She thinks a functional workup was done around the time they were discovered, but I don't have those records.     Pt reports no abdominal discomfort.  No h/o hypertensive emergency. Blood pressure hasn't been difficult to control recently.  Pt denies h/o paroxysmal symptoms such as syncope, pallor, dizziness, palpitations, headaches.      No new HTN or worsening of known HTN   She has diabetes, but has not worsened recently.  Denies polyuria, polydipsia, nocturia, unexplained weight loss or blurred vision     No easy bruisability or abnormal stretch marks. No muscle weakness     With regards to osteoporosis:      Past medication: Alendronate - did not tolerate due to bone pain.  She was also offered prolia, but she read about the side-effects and is not interested in starting " it or any other therapy for osteoporosis.     Calcium intake?  1 serving of dairy per day  Vit D intake?  2000 IU a day   Weight bearing exercise?   no  Recent falls? no  Fall Precautions? no     Recent fractures? no  Height loss (>2 inches)? no     Tobacco use ?  no  EtOH use? nono     Current diarrhea or h/o malabsorption? no  Chronic steroids? Inhaled only  Anticoagulants? no  Proton Pump Inhibitors? no  Antiseizure medications? no   Thyroid disease? no  Anemia? yes  Kidney Stones? no  Hyperparathyroidism? no     Malignancy involving bone (active or history)? no  Prior radiation treatment? no  Unexplained elevation of alkaline phosphatase? no  Dental work planned? no     Last BMD: 4/2018  Lumbar Spine: BMD 0.866g/cm2: T-score of -1.6.    Total Hip: BMD 0.817g/cm2. T-score is -1.0, and the Z-score is 1.0.    Femoral neck: Bone mineral density is 0.611g/cm2 and the T-score is -2.1 and the Z-score is 0.1 g/cm2.    There is a 16% risk of a major osteoporotic fracture and a 4.7% risk of hip fracture in the next 10 years (FRAX).    Compared with previous DXA, BMD at the lumbar spine has remained stable, and the BMD at the total hip has declined 4.5%.     Review of Systems   Constitutional: Negative for fatigue.   Eyes: Negative for visual disturbance.   Respiratory: Negative for shortness of breath.    Cardiovascular: Negative for chest pain.   Gastrointestinal: Negative for abdominal pain.   Musculoskeletal: Negative for arthralgias.   Skin: Negative for wound.   Neurological: Negative for headaches.   Hematological: Does not bruise/bleed easily.   Psychiatric/Behavioral: Negative for sleep disturbance.     Objective:   Physical Exam   Constitutional: She appears well-developed.   Neck: Thyromegaly present.   Cardiovascular: Normal rate.   Pulmonary/Chest: Effort normal.   Abdominal: Soft.   Vitals reviewed.  Appropriate footwear, Foot exam deferred, done 1/2019.    No ulcers or wounds.   injection sites are ok. No  "lipo hypertropthy or atrophy    Visit Vitals  BP (!) 164/72   Pulse 80   Ht 5' 3" (1.6 m)   Wt 72.3 kg (159 lb 4.5 oz)   BMI 28.22 kg/m²      Lab Review:   Lab Results   Component Value Date    HGBA1C 8.4 (H) 09/17/2019     Lab Results   Component Value Date    CHOL 134 08/30/2019    HDL 49 08/30/2019    LDLCALC 59.2 (L) 08/30/2019    TRIG 129 08/30/2019    CHOLHDL 36.6 08/30/2019     Lab Results   Component Value Date     06/17/2019    K 4.1 06/17/2019     06/17/2019    CO2 23 06/17/2019     (H) 06/17/2019    BUN 28 (H) 06/17/2019    CREATININE 1.2 06/17/2019    CALCIUM 9.9 06/17/2019    PROT 7.0 04/03/2019    ALBUMIN 3.5 04/03/2019    BILITOT 0.4 04/03/2019    ALKPHOS 82 04/03/2019    AST 16 04/03/2019    ALT 17 04/03/2019    ANIONGAP 10 06/17/2019    ESTGFRAFRICA 49 (A) 06/17/2019    EGFRNONAA 43 (A) 06/17/2019    TSH 1.127 12/19/2018     Vit D, 25-Hydroxy   Date Value Ref Range Status   03/26/2019 35 30 - 96 ng/mL Final     Comment:     Vitamin D deficiency.........<10 ng/mL                              Vitamin D insufficiency......10-29 ng/mL       Vitamin D sufficiency........> or equal to 30 ng/mL  Vitamin D toxicity............>100 ng/mL       Assessment and Plan     1. Diabetic polyneuropathy associated with type 2 diabetes mellitus  insulin (LANTUS SOLOSTAR U-100 INSULIN) glargine 100 units/mL (3mL) SubQ pen    blood sugar diagnostic (ACCU-CHEK HECTOR) Strp    metFORMIN (GLUCOPHAGE) 500 MG tablet    linaGLIPtin (TRADJENTA) 5 mg Tab tablet    pen needle, diabetic (BD ULTRA-FINE MINI PEN NEEDLE) 31 gauge x 3/16" Ndle    Hemoglobin A1c    Comprehensive metabolic panel   2. Diabetic sensorimotor neuropathy     3. Uncontrolled type 2 diabetes mellitus with diabetic neuropathy, without long-term current use of insulin       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, with long-term current use of insulin  Followed by " ophthalmology for eye injections. See above regarding diabetes.     Type 2 diabetes mellitus with stage 3 chronic kidney disease, with long-term current use of insulin  -- A1c has improved since last visit  Reviewed goals of therapy are to get the best control we can without hypoglycemia.   Medication changes:   Continue: Lantus 34 units nightly  Metformin 500 mg BID - need to monitor GFR - low threshold to stop if GFR continues to worsen.  Tradjenta 5 mg daily  Reviewed patient's current insulin regimen.   Advised frequent self blood glucose monitoring. Patient encouraged to document glucose results and bring them to every clinic visit.  Hypoglycemia precautions discussed. Instructed on precautions before driving.    Close adherence to lifestyle changes recommended.       Eyes: Followed regularly in ophthalmology clinic  Feet: Podiatry scheduled 1/28  Kidneys: Urine microalbumin/Cr elevated - has nephrology follow-up later this month.  HbA1c: Recheck in 3 months.     Adrenal cortical nodule: repeat CT 6/2016  Stable on imaging for over a decade suggests benign adenomas. She thinks she had a biochemical workup in the past, but the results aren't available in our system as they predate 2004. Blood pressure is reasonably well-controlled. No hypokalemia. No symptoms to suggest pheo, and no overt Cushing syndrome. Subclinical Cushing is a possibility, but the benefit vs. risk of adrenalectomy would very likely favor conservative management. Therefore, will hold off on further testing at this point since it would be unlikely to alter management.     Nontoxic multinodular goiter  Repeat ultrasound in 9/2021.     Hyperlipidemia  On simvastatin     Osteoporosis  Patient does not wish to pursue antiresorptive treatments citing previous side-effects and wanting to avoid new medications due to fear of side-effects (Prolia specifically).     Taking vitamin D 2000 IU daily and she gets calcium in her diet.    Walking for weight  bearing exercise.     Repeat BMD in 2-3 years.    HTN  -- BP elevated today, took medication 1 hour ago.  -- Encouraged patient to take BP medication every morning and to monitor BP at home.  Notify PCP is sustaining >130/80.    HLD  Controlled   On statin per ADA recommendations    Follow up in about 4 months (around 2/15/2020).  Labs prior to next appointment with me

## 2019-10-11 ENCOUNTER — PES CALL (OUTPATIENT)
Dept: ADMINISTRATIVE | Facility: CLINIC | Age: 79
End: 2019-10-11

## 2019-10-14 ENCOUNTER — PATIENT OUTREACH (OUTPATIENT)
Dept: ADMINISTRATIVE | Facility: OTHER | Age: 79
End: 2019-10-14

## 2019-10-15 ENCOUNTER — OFFICE VISIT (OUTPATIENT)
Dept: ENDOCRINOLOGY | Facility: CLINIC | Age: 79
End: 2019-10-15
Payer: MEDICARE

## 2019-10-15 VITALS
BODY MASS INDEX: 28.23 KG/M2 | WEIGHT: 159.31 LBS | HEART RATE: 80 BPM | HEIGHT: 63 IN | DIASTOLIC BLOOD PRESSURE: 72 MMHG | SYSTOLIC BLOOD PRESSURE: 164 MMHG

## 2019-10-15 DIAGNOSIS — N18.30 CHRONIC KIDNEY DISEASE, STAGE 3, MOD DECREASED GFR: ICD-10-CM

## 2019-10-15 DIAGNOSIS — E11.22 TYPE 2 DIABETES MELLITUS WITH STAGE 3 CHRONIC KIDNEY DISEASE, WITH LONG-TERM CURRENT USE OF INSULIN: ICD-10-CM

## 2019-10-15 DIAGNOSIS — I10 ESSENTIAL HYPERTENSION: ICD-10-CM

## 2019-10-15 DIAGNOSIS — E27.8 ADRENAL CORTICAL NODULE: ICD-10-CM

## 2019-10-15 DIAGNOSIS — Z79.4 TYPE 2 DIABETES MELLITUS WITH STAGE 3 CHRONIC KIDNEY DISEASE, WITH LONG-TERM CURRENT USE OF INSULIN: ICD-10-CM

## 2019-10-15 DIAGNOSIS — N18.30 TYPE 2 DIABETES MELLITUS WITH STAGE 3 CHRONIC KIDNEY DISEASE, WITH LONG-TERM CURRENT USE OF INSULIN: ICD-10-CM

## 2019-10-15 DIAGNOSIS — E11.42 DIABETIC SENSORIMOTOR NEUROPATHY: ICD-10-CM

## 2019-10-15 DIAGNOSIS — E78.5 HYPERLIPIDEMIA, UNSPECIFIED HYPERLIPIDEMIA TYPE: ICD-10-CM

## 2019-10-15 DIAGNOSIS — E04.2 NONTOXIC MULTINODULAR GOITER: ICD-10-CM

## 2019-10-15 DIAGNOSIS — N18.30 TYPE 2 DM WITH CKD STAGE 3 AND HYPERTENSION: ICD-10-CM

## 2019-10-15 DIAGNOSIS — I12.9 TYPE 2 DM WITH CKD STAGE 3 AND HYPERTENSION: ICD-10-CM

## 2019-10-15 DIAGNOSIS — E11.42 DIABETIC POLYNEUROPATHY ASSOCIATED WITH TYPE 2 DIABETES MELLITUS: Primary | ICD-10-CM

## 2019-10-15 DIAGNOSIS — E11.22 TYPE 2 DM WITH CKD STAGE 3 AND HYPERTENSION: ICD-10-CM

## 2019-10-15 PROCEDURE — 1101F PR PT FALLS ASSESS DOC 0-1 FALLS W/OUT INJ PAST YR: ICD-10-PCS | Mod: HCNC,CPTII,S$GLB, | Performed by: NURSE PRACTITIONER

## 2019-10-15 PROCEDURE — 99214 PR OFFICE/OUTPT VISIT, EST, LEVL IV, 30-39 MIN: ICD-10-PCS | Mod: HCNC,S$GLB,, | Performed by: NURSE PRACTITIONER

## 2019-10-15 PROCEDURE — 99999 PR PBB SHADOW E&M-EST. PATIENT-LVL III: ICD-10-PCS | Mod: PBBFAC,HCNC,, | Performed by: NURSE PRACTITIONER

## 2019-10-15 PROCEDURE — 99999 PR PBB SHADOW E&M-EST. PATIENT-LVL III: CPT | Mod: PBBFAC,HCNC,, | Performed by: NURSE PRACTITIONER

## 2019-10-15 PROCEDURE — 1101F PT FALLS ASSESS-DOCD LE1/YR: CPT | Mod: HCNC,CPTII,S$GLB, | Performed by: NURSE PRACTITIONER

## 2019-10-15 PROCEDURE — 3077F SYST BP >= 140 MM HG: CPT | Mod: HCNC,CPTII,S$GLB, | Performed by: NURSE PRACTITIONER

## 2019-10-15 PROCEDURE — 3077F PR MOST RECENT SYSTOLIC BLOOD PRESSURE >= 140 MM HG: ICD-10-PCS | Mod: HCNC,CPTII,S$GLB, | Performed by: NURSE PRACTITIONER

## 2019-10-15 PROCEDURE — 99214 OFFICE O/P EST MOD 30 MIN: CPT | Mod: HCNC,S$GLB,, | Performed by: NURSE PRACTITIONER

## 2019-10-15 PROCEDURE — 3078F PR MOST RECENT DIASTOLIC BLOOD PRESSURE < 80 MM HG: ICD-10-PCS | Mod: HCNC,CPTII,S$GLB, | Performed by: NURSE PRACTITIONER

## 2019-10-15 PROCEDURE — 3078F DIAST BP <80 MM HG: CPT | Mod: HCNC,CPTII,S$GLB, | Performed by: NURSE PRACTITIONER

## 2019-10-15 RX ORDER — PEN NEEDLE, DIABETIC 30 GX3/16"
NEEDLE, DISPOSABLE MISCELLANEOUS
Qty: 400 EACH | Refills: 3 | Status: SHIPPED | OUTPATIENT
Start: 2019-10-15 | End: 2020-02-20 | Stop reason: SDUPTHER

## 2019-10-15 RX ORDER — INSULIN GLARGINE 100 [IU]/ML
34 INJECTION, SOLUTION SUBCUTANEOUS NIGHTLY
Qty: 45 ML | Refills: 6 | Status: ON HOLD | OUTPATIENT
Start: 2019-10-15 | End: 2019-12-21 | Stop reason: SDUPTHER

## 2019-10-15 RX ORDER — METFORMIN HYDROCHLORIDE 500 MG/1
500 TABLET ORAL 2 TIMES DAILY WITH MEALS
Qty: 180 TABLET | Refills: 6 | Status: SHIPPED | OUTPATIENT
Start: 2019-10-15 | End: 2020-02-20 | Stop reason: SDUPTHER

## 2019-10-22 ENCOUNTER — PATIENT OUTREACH (OUTPATIENT)
Dept: OTHER | Facility: OTHER | Age: 79
End: 2019-10-22

## 2019-10-22 NOTE — PROGRESS NOTES
"Digital Medicine: Health  Follow-Up    Patient states, "I am trying to better myself everyday".     The history is provided by the patient. No  was used.     Follow Up  Follow-up reason(s): reading review and routine education      Routine Education Topics: eating patterns and physical activity          Diet:       Patient reports no changes in dietary habits. Patient did not want to discuss dietary habits at this time.     Assigning the following patient goals: maintain low sodium diet    Physical Activity:   When asked if exercising, patient responded: no  Patient has limited mobility: old injury  Patient has the following chronic pain: arthritis    She identified the following barriers to physical activity: pain/injury/recent surgery    Patient reports she received an injection in the knee to help with pain. Patient reports she is doing much better.     Assigning the following patient goal(s): participate in exercise weekly    INTERVENTION(S)  encouragement/support    PLAN  patient verbalizes understanding and patient amenable to changes      There are no preventive care reminders to display for this patient.    Last 5 Patient Entered Readings                                      Current 30 Day Average: 139/63     Recent Readings 10/21/2019 10/15/2019 10/14/2019 10/9/2019 10/7/2019    SBP (mmHg) 130 122 126 137 144    DBP (mmHg) 55 52 63 63 67    Pulse 84 73 67 73 80                "

## 2019-11-04 ENCOUNTER — ANESTHESIA EVENT (OUTPATIENT)
Dept: ENDOSCOPY | Facility: HOSPITAL | Age: 79
End: 2019-11-04
Payer: MEDICARE

## 2019-11-04 ENCOUNTER — PATIENT OUTREACH (OUTPATIENT)
Dept: ADMINISTRATIVE | Facility: OTHER | Age: 79
End: 2019-11-04

## 2019-11-05 ENCOUNTER — HOSPITAL ENCOUNTER (OUTPATIENT)
Facility: HOSPITAL | Age: 79
Discharge: HOME | End: 2019-11-05
Attending: COLON & RECTAL SURGERY | Admitting: COLON & RECTAL SURGERY
Payer: MEDICARE

## 2019-11-05 ENCOUNTER — ANESTHESIA (OUTPATIENT)
Dept: ENDOSCOPY | Facility: HOSPITAL | Age: 79
End: 2019-11-05
Payer: MEDICARE

## 2019-11-05 VITALS
HEART RATE: 87 BPM | HEIGHT: 63 IN | OXYGEN SATURATION: 94 % | BODY MASS INDEX: 28.35 KG/M2 | TEMPERATURE: 98 F | DIASTOLIC BLOOD PRESSURE: 75 MMHG | SYSTOLIC BLOOD PRESSURE: 164 MMHG | WEIGHT: 160 LBS | RESPIRATION RATE: 16 BRPM

## 2019-11-05 DIAGNOSIS — Z12.11 COLON CANCER SCREENING: Primary | ICD-10-CM

## 2019-11-05 LAB — POCT GLUCOSE: 111 MG/DL (ref 70–110)

## 2019-11-05 PROCEDURE — 45385 COLONOSCOPY W/LESION REMOVAL: CPT | Mod: HCNC | Performed by: COLON & RECTAL SURGERY

## 2019-11-05 PROCEDURE — 27201012 HC FORCEPS, HOT/COLD, DISP: Mod: HCNC | Performed by: COLON & RECTAL SURGERY

## 2019-11-05 PROCEDURE — 63600175 PHARM REV CODE 636 W HCPCS: Mod: HCNC | Performed by: NURSE PRACTITIONER

## 2019-11-05 PROCEDURE — 88305 TISSUE SPECIMEN TO PATHOLOGY - SURGERY: ICD-10-PCS | Mod: 26,HCNC,, | Performed by: PATHOLOGY

## 2019-11-05 PROCEDURE — 82962 GLUCOSE BLOOD TEST: CPT | Mod: HCNC | Performed by: COLON & RECTAL SURGERY

## 2019-11-05 PROCEDURE — 45380 COLONOSCOPY AND BIOPSY: CPT | Mod: 59,HCNC,, | Performed by: COLON & RECTAL SURGERY

## 2019-11-05 PROCEDURE — E9220 PRA ENDO ANESTHESIA: HCPCS | Mod: PT,HCNC,, | Performed by: NURSE ANESTHETIST, CERTIFIED REGISTERED

## 2019-11-05 PROCEDURE — 37000009 HC ANESTHESIA EA ADD 15 MINS: Mod: HCNC | Performed by: COLON & RECTAL SURGERY

## 2019-11-05 PROCEDURE — 45380 PR COLONOSCOPY,BIOPSY: ICD-10-PCS | Mod: 59,HCNC,, | Performed by: COLON & RECTAL SURGERY

## 2019-11-05 PROCEDURE — 37000008 HC ANESTHESIA 1ST 15 MINUTES: Mod: HCNC | Performed by: COLON & RECTAL SURGERY

## 2019-11-05 PROCEDURE — 45385 COLONOSCOPY W/LESION REMOVAL: CPT | Mod: PT,HCNC,, | Performed by: COLON & RECTAL SURGERY

## 2019-11-05 PROCEDURE — 63600175 PHARM REV CODE 636 W HCPCS: Mod: HCNC | Performed by: NURSE ANESTHETIST, CERTIFIED REGISTERED

## 2019-11-05 PROCEDURE — 88305 TISSUE EXAM BY PATHOLOGIST: CPT | Mod: HCNC | Performed by: PATHOLOGY

## 2019-11-05 PROCEDURE — 45385 PR COLONOSCOPY,REMV LESN,SNARE: ICD-10-PCS | Mod: PT,HCNC,, | Performed by: COLON & RECTAL SURGERY

## 2019-11-05 PROCEDURE — 45380 COLONOSCOPY AND BIOPSY: CPT | Mod: HCNC | Performed by: COLON & RECTAL SURGERY

## 2019-11-05 PROCEDURE — 88305 TISSUE EXAM BY PATHOLOGIST: CPT | Mod: 26,HCNC,, | Performed by: PATHOLOGY

## 2019-11-05 PROCEDURE — E9220 PRA ENDO ANESTHESIA: ICD-10-PCS | Mod: PT,HCNC,, | Performed by: NURSE ANESTHETIST, CERTIFIED REGISTERED

## 2019-11-05 PROCEDURE — 27201089 HC SNARE, DISP (ANY): Mod: HCNC | Performed by: COLON & RECTAL SURGERY

## 2019-11-05 RX ORDER — SODIUM CHLORIDE 9 MG/ML
INJECTION, SOLUTION INTRAVENOUS CONTINUOUS
Status: DISCONTINUED | OUTPATIENT
Start: 2019-11-05 | End: 2019-11-05 | Stop reason: HOSPADM

## 2019-11-05 RX ORDER — PHENYLEPHRINE HYDROCHLORIDE 10 MG/ML
INJECTION INTRAVENOUS
Status: DISCONTINUED | OUTPATIENT
Start: 2019-11-05 | End: 2019-11-05

## 2019-11-05 RX ORDER — LIDOCAINE HCL/PF 100 MG/5ML
SYRINGE (ML) INTRAVENOUS
Status: DISCONTINUED | OUTPATIENT
Start: 2019-11-05 | End: 2019-11-05

## 2019-11-05 RX ORDER — SODIUM CHLORIDE 9 MG/ML
INJECTION, SOLUTION INTRAVENOUS CONTINUOUS
Status: DISCONTINUED | OUTPATIENT
Start: 2019-11-05 | End: 2019-11-05

## 2019-11-05 RX ORDER — PROPOFOL 10 MG/ML
VIAL (ML) INTRAVENOUS
Status: DISCONTINUED | OUTPATIENT
Start: 2019-11-05 | End: 2019-11-05

## 2019-11-05 RX ORDER — PROPOFOL 10 MG/ML
VIAL (ML) INTRAVENOUS CONTINUOUS PRN
Status: DISCONTINUED | OUTPATIENT
Start: 2019-11-05 | End: 2019-11-05

## 2019-11-05 RX ADMIN — PHENYLEPHRINE HYDROCHLORIDE 200 MCG: 10 INJECTION INTRAVENOUS at 08:11

## 2019-11-05 RX ADMIN — PROPOFOL 125 MCG/KG/MIN: 10 INJECTION, EMULSION INTRAVENOUS at 08:11

## 2019-11-05 RX ADMIN — PHENYLEPHRINE HYDROCHLORIDE 100 MCG: 10 INJECTION INTRAVENOUS at 08:11

## 2019-11-05 RX ADMIN — LIDOCAINE HYDROCHLORIDE 40 MG: 20 INJECTION, SOLUTION INTRAVENOUS at 08:11

## 2019-11-05 RX ADMIN — SODIUM CHLORIDE: 0.9 INJECTION, SOLUTION INTRAVENOUS at 08:11

## 2019-11-05 RX ADMIN — PROPOFOL 60 MG: 10 INJECTION, EMULSION INTRAVENOUS at 08:11

## 2019-11-05 NOTE — ANESTHESIA PREPROCEDURE EVALUATION
11/05/2019      Myesha Quinones is a 79 y.o. female NICM (EF has normalized), asthma (took albuterol this am), CAD, HTN, DMII, KHOA, CKD3    Patient Active Problem List   Diagnosis    History of nonmelanoma skin cancer    Nonischemic cardiomyopathy    LBBB (left bundle branch block)    Nontoxic multinodular goiter    Meningioma    Asthma, chronic    Biventricular ICD (implantable cardioverter-defibrillator) in place    Tremor    Coronary artery disease involving native coronary artery without angina pectoris - Non-obstructive 50% LAD    Essential hypertension    Hyperlipidemia    History of breast cancer    Adrenal cortical nodule: repeat CT 6/2016    Calcification of aorta    Diabetic polyneuropathy associated with type 2 diabetes mellitus    Osteoporosis    KHOA (obstructive sleep apnea)    Type 2 diabetes mellitus with stage 3 chronic kidney disease, with long-term current use of insulin    Chronic kidney disease, stage 3, mod decreased GFR    Iron deficiency anemia    Chemotherapy-induced neuropathy    Hypomagnesemia    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, with long-term current use of insulin    Hypertensive retinopathy, bilateral    Multiple lung nodules    COPD (chronic obstructive pulmonary disease)    Mixed conductive and sensorineural hearing loss    ICD (implantable cardioverter-defibrillator) battery depletion    Type 2 DM with CKD stage 3 and hypertension    Cardiomyopathy, ischemic    Metabolic bone disease    Proteinuria    Seasonal allergies    Colon cancer screening       Review of patient's allergies indicates:   Allergen Reactions    Iodine and iodide containing products Hives    Nifedipine      weakness       Current Facility-Administered Medications on File Prior to Visit    Medication Dose Route Frequency Provider Last Rate Last Dose    0.9%  NaCl infusion   Intravenous Continuous Chela Horne, NP        0.9%  NaCl infusion   Intravenous Continuous Boaz Botello MD         Current Outpatient Medications on File Prior to Visit   Medication Sig Dispense Refill    ACCU-CHEK ANGELICA CONTROL SOLN Soln       ACCU-CHEK ANGELICA PLUS METER Misc       albuterol-ipratropium (DUO-NEB) 2.5 mg-0.5 mg/3 mL nebulizer solution TAKE 1 VIAL BY NEBULIZATION EVERY 4 (FOUR) HOURS. RESCUE (Patient not taking: Reported on 10/15/2019) 90 mL 3    aspirin (ENTERIC COATED ASPIRIN) 81 MG EC tablet Take 1 tablet by mouth once daily.       azelastine (ASTELIN) 137 mcg (0.1 %) nasal spray 1 spray (137 mcg total) by Nasal route 2 (two) times daily. For severe allergy 30 mL 1    benzonatate (TESSALON PERLES) 100 MG capsule Take 2 capsules (200 mg total) by mouth 3 (three) times daily as needed for Cough. 90 capsule 3    blood sugar diagnostic (ACCU-CHEK ANGELICA) Strp Uses Accu-Check Angelica meter to test BG 4x/day 400 strip 6    carvedilol (COREG) 25 MG tablet TAKE 2 TABLETS (50 MG TOTAL) BY MOUTH 2 (TWO) TIMES DAILY. 360 tablet 3    chlorthalidone (HYGROTEN) 25 MG Tab Take 1 tablet (25 mg total) by mouth once daily. 90 tablet 3    cholecalciferol, vitamin D3, (VITAMIN D3) 2,000 unit Tab Take by mouth once daily.      cloNIDine 0.1 mg/24 hr td ptwk (CATAPRES) 0.1 mg/24 hr Place 1 patch onto the skin every 7 days. 4 patch 11    CYANOCOBALAMIN, VITAMIN B-12, (B-12 DOTS ORAL) Take 1 tablet by mouth once daily.      diltiaZEM (CARDIZEM CD) 120 MG Cp24 Take 1 capsule (120 mg total) by mouth once daily. 90 capsule 3    fluticasone (FLONASE) 50 mcg/actuation nasal spray 2 sprays (100 mcg total) by Each Nare route once daily. 1 Bottle 12    fluticasone-salmeterol diskus inhaler 250-50 mcg Inhale 1 puff into the lungs 2 (two) times daily. This is a change from one month (Patient not taking: Reported on  "10/15/2019) 180 each 3    GAVILYTE-G 236-22.74-6.74 -5.86 gram suspension       hydrALAZINE (APRESOLINE) 100 MG tablet TAKE 1 TABLET (100 MG TOTAL) BY MOUTH 3 (THREE) TIMES DAILY. 270 tablet 2    insulin (LANTUS SOLOSTAR U-100 INSULIN) glargine 100 units/mL (3mL) SubQ pen Inject 34 Units into the skin every evening. 45 mL 6    irbesartan (AVAPRO) 300 MG tablet Take 1 tablet (300 mg total) by mouth every evening. 90 tablet 3    lancets (ACCU-CHEK SOFTCLIX LANCETS) Misc Uses Accu-Chek Angelica meter to test BG 4x/day 400 each 3    lancets 30 gauge Misc       lancing device Misc       levocetirizine (XYZAL) 5 MG tablet Take 1 tablet (5 mg total) by mouth every evening. 30 tablet 11    linaGLIPtin (TRADJENTA) 5 mg Tab tablet Take 1 tablet (5 mg total) by mouth once daily. 90 tablet 3    magnesium oxide (MAG-OX) 400 mg tablet Take 1 tablet (400 mg total) by mouth once daily.  0    metFORMIN (GLUCOPHAGE) 500 MG tablet Take 1 tablet (500 mg total) by mouth 2 (two) times daily with meals. 180 tablet 6    nitroGLYCERIN (NITROSTAT) 0.4 MG SL tablet Place 1 tablet under the tongue continuous prn.        omega-3 fatty acids (FISH OIL) 500 mg Cap Take 1 capsule by mouth Twice daily.      pen needle, diabetic (BD ULTRA-FINE MINI PEN NEEDLE) 31 gauge x 3/16" Ndle USE WITH LANTUS AT BEDTIME 400 each 3    pravastatin (PRAVACHOL) 40 MG tablet Take 1 tablet (40 mg total) by mouth once daily. 90 tablet 3    VENTOLIN HFA 90 mcg/actuation inhaler INHALE 2 PUFFS INTO THE LUNGS EVERY 6 (SIX) HOURS AS NEEDED FOR WHEEZING. 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 EP LAB;  Service: Cardiology;  Laterality: N/A;  DONNA, CRT-D gen change, MDT, MAC, SK, 3 Prep    REVISION OF SKIN POCKET FOR CARDIOVERTER-DEFIBRILLATOR  12/17/2018    Procedure: Revision, Skin Pocket, For Cardioverter-Defibrillator;  Surgeon: Jan Mckeon MD;  Location: [facility] EP LAB;  Service: Cardiology;;    SQUAMOUS CELL CARCINOMA EXCISION      remved from forehead    TONSILLECTOMY         Social History     Socioeconomic History    Marital status:      Spouse name: Not on file    Number of children: Not on file    Years of education: Not on file    Highest education level: Not on file   Occupational History    Occupation: Homemaker     Employer: OTHER   Social Needs    Financial resource strain: Not on file    Food insecurity:     Worry: Not on file     Inability: Not on file    Transportation needs:     Medical: Not on file     Non-medical: Not on file   Tobacco Use    Smoking status: Never Smoker    Smokeless tobacco: Never Used   Substance and Sexual Activity    Alcohol use: No     Alcohol/week: 0.0 standard drinks    Drug use: No    Sexual activity: Yes     Partners: Male   Lifestyle    Physical activity:     Days per week: Not on file     Minutes per session: Not on file    Stress: Not on file   Relationships    Social connections:     Talks on phone: Not on file     Gets together: Not on file     Attends Anabaptist service: Not on file     Active member of club or organization: Not on file     Attends meetings of clubs or organizations: Not on file     Relationship status: Not on file   Other Topics Concern    Are you pregnant or think you may be? No    Breast-feeding No   Social History  Narrative    Not on file         2D Echo:  Results for orders placed or performed during the hospital encounter of 07/18/16   2D echo with color flow doppler   Result Value Ref Range    QEF 50 55 - 65    Diastolic Dysfunction Yes (A)     Est. PA Systolic Pressure 42 (A)     Tricuspid Valve Regurgitation TRIVIAL TO MILD          Pre-op Assessment    I have reviewed the Patient Summary Reports.      I have reviewed the Medications.     Review of Systems  Anesthesia Hx:  History of prior surgery of interest to airway management or planning: Denies Family Hx of Anesthesia complications.  Personal Hx of Anesthesia complications  Unintended Unpleasent Intraoperative Awareness and during MAC / sedation only       Physical Exam  General:  Obesity    Airway/Jaw/Neck:  Airway Findings: Mouth Opening: Normal Tongue: Normal  General Airway Assessment: Adult  Mallampati: II  TM Distance: Normal, at least 6 cm  Jaw/Neck Findings:  Neck ROM: Normal ROM      Dental:  Dental Findings: In tact   Chest/Lungs:  Chest/Lungs Findings: Normal Respiratory Rate, Clear to auscultation         Mental Status:  Mental Status Findings:  Cooperative, Alert and Oriented         Anesthesia Plan  Type of Anesthesia, risks & benefits discussed:  Anesthesia Type:  general  Patient's Preference:   Intra-op Monitoring Plan: standard ASA monitors  Intra-op Monitoring Plan Comments:   Post Op Pain Control Plan: per primary service following discharge from PACU  Post Op Pain Control Plan Comments:   Induction:   IV  Beta Blocker:  Patient is on a Beta-Blocker and has received one dose within the past 24 hours (No further documentation required).       Informed Consent: Patient understands risks and agrees with Anesthesia plan.  Questions answered. Anesthesia consent signed with patient.  ASA Score: 3     Day of Surgery Review of History & Physical:    H&P update referred to the provider.         Ready For Surgery From Anesthesia Perspective.

## 2019-11-05 NOTE — [employer] PATIENT INSTRUCTIONS
Discharge Summary/Instructions after an Endoscopic Procedure  Patient Name: Myesha Quinones  Patient MRN: [id number]  Patient YOB: 1940 Tuesday, November 05, 2019  Boaz Botello MD  RESTRICTIONS:  During your procedure today, you received medications for sedation.  These   medications may affect your judgment, balance and coordination.  Therefore,   for 24 hours, you have the following restrictions:   - DO NOT drive a car, operate machinery, make legal/financial decisions,   sign important papers or drink alcohol.    ACTIVITY:  Today: no heavy lifting, straining or running due to procedural   sedation/anesthesia.  The following day: return to full activity including work.  DIET:  Eat and drink normally unless instructed otherwise.     TREATMENT FOR COMMON SIDE EFFECTS:  - Mild abdominal pain, nausea, belching, bloating or excessive gas:  rest,   eat lightly and use a heating pad.  - Sore Throat: treat with throat lozenges and/or gargle with warm salt   water.  - Because air was used during the procedure, expelling large amounts of air   from your rectum or belching is normal.  - If a bowel prep was taken, you may not have a bowel movement for 1-3 days.    This is normal.  SYMPTOMS TO WATCH FOR AND REPORT TO YOUR PHYSICIAN:  1. Abdominal pain or bloating, other than gas cramps.  2. Chest pain.  3. Back pain.  4. Signs of infection such as: chills or fever occurring within 24 hours   after the procedure.  5. Rectal bleeding, which would show as bright red, maroon, or black stools.   (A tablespoon of blood from the rectum is not serious, especially if   hemorrhoids are present.)  6. Vomiting.  7. Weakness or dizziness.  GO DIRECTLY TO THE NEAREST EMERGENCY ROOM IF YOU HAVE ANY OF THE FOLLOWING:      Difficulty breathing              Chills and/or fever over 101 F   Persistent vomiting and/or vomiting blood   Severe abdominal pain   Severe chest pain   Black, tarry stools   Bleeding- more than one  tablespoon   Any other symptom or condition that you feel may need urgent attention  Your doctor recommends these additional instructions:  If any biopsies were taken, your doctors clinic will contact you in 1 to 2   weeks with any results.  - Repeat colonoscopy date to be determined after pending pathology results   are reviewed for surveillance based on pathology results.   - Discharge patient to home.   - High fiber diet.   - Continue present medications.   - Await pathology results.   - Patient has a contact number available for emergencies.  The signs and   symptoms of potential delayed complications were discussed with the   patient.  Return to normal activities tomorrow.  Written discharge   instructions were provided to the patient.  For questions, problems or results please call your physician - Boaz Botello MD at Work:  [phone].  [facility], EMERGENCY ROOM PHONE NUMBER: [phone]  IF A COMPLICATION OR EMERGENCY SITUATION ARISES AND YOU ARE UNABLE TO REACH   YOUR PHYSICIAN - GO DIRECTLY TO THE EMERGENCY ROOM.  Boaz Botello MD  11/5/2019 8:59:47 AM  This report has been verified and signed electronically.  [employer]

## 2019-11-05 NOTE — DISCHARGE INSTRUCTIONS
Colonoscopy     A camera attached to a flexible tube with a viewing lens is used to take video pictures.     Colonoscopy is a test to view the inside of your lower digestive tract (colon and rectum). Sometimes it can show the last part of the small intestine (ileum). During the test, small pieces of tissue may be removed for testing. This is called a biopsy. Small growths, such as polyps, may also be removed.   Why is colonoscopy done?  The test is done to help look for colon cancer. And it can help find the source of abdominal pain, bleeding, and changes in bowel habits. It may be needed once a year, depending on factors such as your:  · Age  · Health history  · Family health history  · Symptoms  · Results from any prior colonoscopy  Risks and possible complications  These include:  · Bleeding               · A puncture or tear in the colon   · Risks of anesthesia  · A cancer lesion not being seen  Getting ready   To prepare for the test:  · Talk with your healthcare provider about the risks of the test (see below). Also ask your healthcare provider about alternatives to the test.  · Tell your healthcare provider about any medicines you take. Also tell him or her about any health conditions you may have.  · Make sure your rectum and colon are empty for the test. Follow the diet and bowel prep instructions exactly. If you dont, the test may need to be rescheduled.  · Plan for a friend or family member to drive you home after the test.     Colonoscopy provides an inside view of the entire colon.     You may discuss the results with your doctor right away or at a future visit.  During the test   The test is usually done in the hospital on an outpatient basis. This means you go home the same day. The procedure takes about 30 minutes. During that time:  · You are given relaxing (sedating) medicine through an IV line. You may be drowsy, or fully asleep.  · The healthcare provider will first give you a physical exam to  check for anal and rectal problems.  · Then the anus is lubricated and the scope inserted.  · If you are awake, you may have a feeling similar to needing to have a bowel movement. You may also feel pressure as air is pumped into the colon. Its OK to pass gas during the procedure.  · Biopsy, polyp removal, or other treatments may be done during the test.  After the test   You may have gas right after the test. It can help to try to pass it to help prevent later bloating. Your healthcare provider may discuss the results with you right away. Or you may need to schedule a follow-up visit to talk about the results. After the test, you can go back to your normal eating and other activities. You may be tired from the sedation and need to rest for a few hours.  Date Last Reviewed: 11/1/2016 © 2000-2017 [employer] [place]. [address] [place] [address], [place] [address]. All rights reserved. This information is not intended as a substitute for professional medical care. Always follow your healthcare professional's instructions.

## 2019-11-05 NOTE — H&P
Endoscopy H&P    Procedure: Colonoscopy    79 YOF with PMH BCC, CHF, CKD3, DM2 presents for surveillance colonoscopy. She reports fragmented stools and intermittent RLQ pain that she attributes to diverticulitis flares. She denies hematochezia or melena.     FHX of CRC - Brother (diagnosed after age 50)    Last colonoscopy - 8/27/14     Diverticulosis     1 5mm polyp in rectum (adenomatous polyp)    SPECIMEN  1) Rectal, 5 mm sessile polyp.  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, with long-term current use of insulin 2/22/2018    COPD (chronic obstructive pulmonary disease)     COPD exacerbation 4/8/2018    Defibrillator discharge     Diabetes mellitus     Diabetes mellitus type II     Diabetes with neurologic complications     Goiter     MNG    HX: breast cancer     Hyperlipidemia     Hypertension     Iron deficiency anemia 5/16/2017    Left kidney mass     Meningioma     Osteoporosis, postmenopausal     Pacemaker     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    Skin cancer     s/p excision    Sleep apnea     CPAP    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/2015    mid forehead    Unspecified vitamin D deficiency     Ventricular tachycardia     Vitamin B12 deficiency     Vitamin D deficiency disease         Sedation Problems: No    Family History   Problem Relation Age of Onset    Diabetes Father     Heart disease Father     Diabetes Sister     Heart disease Sister     Diabetes Brother     Heart disease Brother     Hypertension Brother     Diabetes Brother     Heart disease Brother     Hypertension Brother     Diabetes Brother     Heart disease Brother     Cancer Brother         colon    Diabetes Brother     Cancer Son         skin    Diabetes Son         prediabetes    Diabetes Daughter         prediabetes    Cancer Daughter         melanoma    Obesity Daughter     Melanoma Daughter     Diabetes Son     Asthma Mother     Hypertension Mother     Stroke Mother     No Known Problems Maternal Grandmother     No Known Problems Maternal Grandfather     No Known Problems Paternal Grandmother     No Known Problems Paternal Grandfather     Amblyopia Neg Hx     Blindness Neg Hx     Cataracts Neg Hx     Glaucoma Neg Hx     Macular degeneration Neg Hx     Retinal detachment Neg Hx     Strabismus Neg Hx     Thyroid disease Neg Hx        Fam Hx of Sedation Problems: No    Social History     Socioeconomic History    Marital status:      Spouse name: Not on file    Number of children: Not on file    Years of education: Not on file    Highest education level: Not on file   Occupational History    Occupation: Homemaker     Employer: OTHER   Social Needs    Financial resource strain: Not on file    Food insecurity:     Worry: Not on file     Inability: Not on file    Transportation needs:     Medical: Not on file     Non-medical: Not on file   Tobacco Use    Smoking status: Never Smoker    Smokeless tobacco: Never Used   Substance and Sexual Activity    Alcohol use: No     Alcohol/week: 0.0 standard drinks    Drug use: No    Sexual activity: Yes     Partners: Male   Lifestyle    Physical activity:     Days per week: Not on file     Minutes per session: Not on file     Stress: Not on file   Relationships    Social connections:     Talks on phone: Not on file     Gets together: Not on file     Attends Muslim service: Not on file     Active member of club or organization: Not on file     Attends meetings of clubs or organizations: Not on file     Relationship status: Not on file   Other Topics Concern    Are you pregnant or think you may be? No    Breast-feeding No   Social History Narrative    Not on file       Review of patient's allergies indicates:   Allergen Reactions    Iodine and iodide containing products Hives    Nifedipine      weakness         Current Facility-Administered Medications:     acetaminophen tablet 650 mg, 650 mg, Oral, Q4H PRN, Samy Rodriguez MD    Current Outpatient Medications:     ACCU-CHEK HECTOR CONTROL SOLN Soln, , Disp: , Rfl:     ACCU-CHEK HECTOR PLUS METER [facility], , Disp: , Rfl:     albuterol-ipratropium (DUO-NEB) 2.5 mg-0.5 mg/3 mL nebulizer solution, TAKE 1 VIAL BY NEBULIZATION EVERY 4 (FOUR) HOURS. RESCUE (Patient not taking: Reported on 10/15/2019), Disp: 90 mL, Rfl: 3    aspirin (ENTERIC COATED ASPIRIN) 81 MG EC tablet, Take 1 tablet by mouth once daily. , Disp: , Rfl:     azelastine (ASTELIN) 137 mcg (0.1 %) nasal spray, 1 spray (137 mcg total) by Nasal route 2 (two) times daily. For severe allergy, Disp: 30 mL, Rfl: 1    benzonatate (TESSALON PERLES) 100 MG capsule, Take 2 capsules (200 mg total) by mouth 3 (three) times daily as needed for Cough., Disp: 90 capsule, Rfl: 3    blood sugar diagnostic (ACCU-CHEK HECTOR) Strp, Uses Accu-Check Hector meter to test BG 4x/day, Disp: 400 strip, Rfl: 6    carvedilol (COREG) 25 MG tablet, TAKE 2 TABLETS (50 MG TOTAL) BY MOUTH 2 (TWO) TIMES DAILY., Disp: 360 tablet, Rfl: 3    chlorthalidone (HYGROTEN) 25 MG Tab, Take 1 tablet (25 mg total) by mouth once daily., Disp: 90 tablet, Rfl: 3    cholecalciferol, vitamin D3, (VITAMIN D3) 2,000 unit Tab, Take by mouth once daily., Disp: , Rfl:      cloNIDine 0.1 mg/24 hr td ptwk (CATAPRES) 0.1 mg/24 hr, Place 1 patch onto the skin every 7 days., Disp: 4 patch, Rfl: 11    CYANOCOBALAMIN, VITAMIN B-12, (B-12 DOTS ORAL), Take 1 tablet by mouth once daily., Disp: , Rfl:     diltiaZEM (CARDIZEM CD) 120 MG Cp24, Take 1 capsule (120 mg total) by mouth once daily., Disp: 90 capsule, Rfl: 3    fluticasone (FLONASE) 50 mcg/actuation nasal spray, 2 sprays (100 mcg total) by Each Nare route once daily. (Patient not taking: Reported on 10/15/2019), Disp: 1 Bottle, Rfl: 12    fluticasone-salmeterol diskus inhaler 250-50 mcg, Inhale 1 puff into the lungs 2 (two) times daily. This is a change from one month (Patient not taking: Reported on 10/15/2019), Disp: 180 each, Rfl: 3    GAVILYTE-G 236-22.74-6.74 -5.86 gram suspension, , Disp: , Rfl:     hydrALAZINE (APRESOLINE) 100 MG tablet, TAKE 1 TABLET (100 MG TOTAL) BY MOUTH 3 (THREE) TIMES DAILY., Disp: 270 tablet, Rfl: 2    insulin (LANTUS SOLOSTAR U-100 INSULIN) glargine 100 units/mL (3mL) SubQ pen, Inject 34 Units into the skin every evening., Disp: 45 mL, Rfl: 6    irbesartan (AVAPRO) 300 MG tablet, Take 1 tablet (300 mg total) by mouth every evening., Disp: 90 tablet, Rfl: 3    lancets (ACCU-CHEK SOFTCLIX LANCETS) Misc, Uses Accu-Chek Angelica meter to test BG 4x/day, Disp: 400 each, Rfl: 3    lancets 30 gauge Misc, , Disp: , Rfl:     lancing device Misc, , Disp: , Rfl:     levocetirizine (XYZAL) 5 MG tablet, Take 1 tablet (5 mg total) by mouth every evening., Disp: 30 tablet, Rfl: 11    linaGLIPtin (TRADJENTA) 5 mg Tab tablet, Take 1 tablet (5 mg total) by mouth once daily., Disp: 90 tablet, Rfl: 3    magnesium oxide (MAG-OX) 400 mg tablet, Take 1 tablet (400 mg total) by mouth once daily., Disp: , Rfl: 0    metFORMIN (GLUCOPHAGE) 500 MG tablet, Take 1 tablet (500 mg total) by mouth 2 (two) times daily with meals., Disp: 180 tablet, Rfl: 6    nitroGLYCERIN (NITROSTAT) 0.4 MG SL tablet, Place 1 tablet under  "the tongue continuous prn.  , Disp: , Rfl:     omega-3 fatty acids (FISH OIL) 500 mg Cap, Take 1 capsule by mouth Twice daily., Disp: , Rfl:     pen needle, diabetic (BD ULTRA-FINE MINI PEN NEEDLE) 31 gauge x 3/16" Ndle, USE WITH LANTUS AT BEDTIME, Disp: 400 each, Rfl: 3    pravastatin (PRAVACHOL) 40 MG tablet, Take 1 tablet (40 mg total) by mouth once daily., Disp: 90 tablet, Rfl: 3    VENTOLIN HFA 90 mcg/actuation inhaler, INHALE 2 PUFFS INTO THE LUNGS EVERY 6 (SIX) HOURS AS NEEDED FOR WHEEZING. RESCUE (Patient not taking: Reported on 10/15/2019), Disp: 18 Inhaler, Rfl: 12    Facility-Administered Medications Ordered in Other Encounters:     0.9%  NaCl infusion, , Intravenous, Continuous, Chela Horne NP    Review of Systems:  Respiratory ROS: no cough, shortness of breath, or wheezing  Cardiovascular ROS: no chest pain or dyspnea on exertion  Gastrointestinal ROS: positive for - abdominal pain and change in stools  Musculoskeletal ROS: negative  Neurological ROS: no TIA or stroke symptoms        Physical Exam:  General: well developed, well nourished, no distress  Head: normocephalic  Airway:  normal oropharynx, airway normal  Neck: supple, symmetrical, trachea midline  Lungs:  clear to auscultation bilaterally and normal respiratory effort  Heart: regular rate and rhythm, S1, S2 normal, no murmur, rub or gallop  Abdomen: soft, non-tender non-distented; bowel sounds normal; no masses,  no organomegaly  Extremities: no cyanosis or edema, or clubbing      Deep Sedation: Mallampati Score per anesthesia     Sedation Plan: Choice     ASA: III    Plan:  - Proceed with surveillance colonoscopy  - Risks, benefits, alternatives to the procedure discussed with the patient who wishes to proceed  - All questions and concerns addressed  - Consents obtained today    -Jessica Thomson M.D  General Surgery PGY1    "

## 2019-11-05 NOTE — ANESTHESIA POSTPROCEDURE EVALUATION
Anesthesia Post Evaluation    Patient: Myesha Quinones    Procedure(s) Performed: Procedure(s) (LRB):  COLONOSCOPY (N/A)    Final Anesthesia Type: general  Patient location during evaluation: GI PACU  Patient participation: Yes- Able to Participate  Level of consciousness: awake and alert  Post-procedure vital signs: reviewed and stable  Pain management: adequate  Airway patency: patent  PONV status at discharge: No PONV  Anesthetic complications: no      Cardiovascular status: blood pressure returned to baseline and stable  Respiratory status: unassisted, spontaneous ventilation and room air  Hydration status: euvolemic  Follow-up not needed.          Vitals Value Taken Time   /75 11/5/2019  9:27 AM   Temp 36.8 °C (98.2 °F) 11/5/2019  8:54 AM   Pulse 87 11/5/2019  9:27 AM   Resp 16 11/5/2019  9:27 AM   SpO2 94 % 11/5/2019  9:27 AM         Event Time     Out of Recovery 09:29:28          Pain/Juan Diego Score: Juan Diego Score: 10 (11/5/2019  9:09 AM)

## 2019-11-05 NOTE — ANESTHESIA RELEASE NOTE
"Anesthesia Release from PACU Note    Patient: Myesha Quinones    Procedure(s) Performed: Procedure(s) (LRB):  COLONOSCOPY (N/A)    Anesthesia type: general    Post pain: Adequate analgesia    Post assessment: no apparent anesthetic complications and tolerated procedure well    Last Vitals:   Visit Vitals  BP (!) 164/75   Pulse 87   Temp 36.8 °C (98.2 °F)   Resp 16   Ht 5' 3" (1.6 m)   Wt 72.6 kg (160 lb)   SpO2 (!) 94%   BMI 28.34 kg/m²       Post vital signs: stable    Level of consciousness: awake and alert     Nausea/Vomiting: no nausea/no vomiting    Complications: none    Airway Patency: patent    Respiratory: unassisted, spontaneous ventilation, room air    Cardiovascular: stable and blood pressure at baseline    Hydration: euvolemic  "

## 2019-11-05 NOTE — TRANSFER OF CARE
"Anesthesia Transfer of Care Note    Patient: Myesha Quinones    Procedure(s) Performed: Procedure(s) (LRB):  COLONOSCOPY (N/A)    Patient location: PACU    Anesthesia Type: general    Transport from OR: Transported from OR on room air with adequate spontaneous ventilation    Post pain: adequate analgesia    Post assessment: no apparent anesthetic complications and tolerated procedure well    Post vital signs: stable    Level of consciousness: awake    Nausea/Vomiting: no nausea/vomiting    Complications: none    Transfer of care protocol was followed      Last vitals:   Visit Vitals  BP (!) 144/64 (BP Location: Left arm, Patient Position: Lying)   Pulse 87   Temp 36.8 °C (98.2 °F) (Temporal)   Resp 18   Ht 5' 3" (1.6 m)   Wt 72.6 kg (160 lb)   SpO2 (!) 94%   BMI 28.34 kg/m²     "

## 2019-11-06 ENCOUNTER — OFFICE VISIT (OUTPATIENT)
Dept: PODIATRY | Facility: CLINIC | Age: 79
End: 2019-11-06
Payer: MEDICARE

## 2019-11-06 VITALS
HEIGHT: 63 IN | BODY MASS INDEX: 28.35 KG/M2 | HEART RATE: 91 BPM | WEIGHT: 160 LBS | SYSTOLIC BLOOD PRESSURE: 108 MMHG | DIASTOLIC BLOOD PRESSURE: 54 MMHG

## 2019-11-06 DIAGNOSIS — E11.49 TYPE II DIABETES MELLITUS WITH NEUROLOGICAL MANIFESTATIONS: Primary | ICD-10-CM

## 2019-11-06 LAB — HM FOOT EXAM: NORMAL

## 2019-11-06 PROCEDURE — 99999 PR PBB SHADOW E&M-EST. PATIENT-LVL V: CPT | Mod: PBBFAC,HCNC,, | Performed by: PODIATRIST

## 2019-11-06 PROCEDURE — 3078F DIAST BP <80 MM HG: CPT | Mod: HCNC,CPTII,S$GLB, | Performed by: PODIATRIST

## 2019-11-06 PROCEDURE — 1101F PR PT FALLS ASSESS DOC 0-1 FALLS W/OUT INJ PAST YR: ICD-10-PCS | Mod: HCNC,CPTII,S$GLB, | Performed by: PODIATRIST

## 2019-11-06 PROCEDURE — 99213 PR OFFICE/OUTPT VISIT, EST, LEVL III, 20-29 MIN: ICD-10-PCS | Mod: HCNC,S$GLB,, | Performed by: PODIATRIST

## 2019-11-06 PROCEDURE — 3078F PR MOST RECENT DIASTOLIC BLOOD PRESSURE < 80 MM HG: ICD-10-PCS | Mod: HCNC,CPTII,S$GLB, | Performed by: PODIATRIST

## 2019-11-06 PROCEDURE — 3074F PR MOST RECENT SYSTOLIC BLOOD PRESSURE < 130 MM HG: ICD-10-PCS | Mod: HCNC,CPTII,S$GLB, | Performed by: PODIATRIST

## 2019-11-06 PROCEDURE — 1101F PT FALLS ASSESS-DOCD LE1/YR: CPT | Mod: HCNC,CPTII,S$GLB, | Performed by: PODIATRIST

## 2019-11-06 PROCEDURE — 3074F SYST BP LT 130 MM HG: CPT | Mod: HCNC,CPTII,S$GLB, | Performed by: PODIATRIST

## 2019-11-06 PROCEDURE — 99999 PR PBB SHADOW E&M-EST. PATIENT-LVL V: ICD-10-PCS | Mod: PBBFAC,HCNC,, | Performed by: PODIATRIST

## 2019-11-06 PROCEDURE — 99213 OFFICE O/P EST LOW 20 MIN: CPT | Mod: HCNC,S$GLB,, | Performed by: PODIATRIST

## 2019-11-06 NOTE — PROGRESS NOTES
Subjective:      Patient ID: Myesha Quinones is a 79 y.o. female.    Chief Complaint: Diabetes Mellitus (Dr. Gaston 9/17/19) and Diabetic Foot Exam      Myesha is a 79 y.o. female who presents to the clinic for evaluation and treatment of high risk feet. 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, with long-term current use of insulin (2/22/2018), COPD (chronic obstructive pulmonary disease), COPD exacerbation (4/8/2018), Defibrillator discharge, Diabetes mellitus, Diabetes mellitus type II, Diabetes with neurologic complications, Goiter, breast cancer, Hyperlipidemia, Hypertension, Iron deficiency anemia (5/16/2017), Left kidney mass, Meningioma, Osteoporosis, postmenopausal, Pacemaker, 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  This patient has documented high risk feet requiring routine maintenance secondary to diabetes mellitis and those secondary complications of diabetes, as mentioned.. Complains of numbness on her feet. No major pedal complaints other than numbness. Known to Dr. Avina.       PCP: Emelina Gaston MD    Date Last Seen by PCP: in epic     Current shoe gear:  Affected Foot: Tennis shoes     Unaffected Foot: Tennis shoes    Hemoglobin A1C   Date Value Ref Range Status   09/17/2019 8.4 (H) 4.0 - 5.6 % Final     Comment:     ADA Screening  Guidelines:  5.7-6.4%  Consistent with prediabetes  >or=6.5%  Consistent with diabetes  High levels of fetal hemoglobin interfere with the HbA1C  assay. Heterozygous hemoglobin variants (HbS, HgC, etc)do  not significantly interfere with this assay.   However, presence of multiple variants may affect accuracy.     06/17/2019 8.9 (H) 4.0 - 5.6 % Final     Comment:     ADA Screening Guidelines:  5.7-6.4%  Consistent with prediabetes  >or=6.5%  Consistent with diabetes  High levels of fetal hemoglobin interfere with the HbA1C  assay. Heterozygous hemoglobin variants (HbS, HgC, etc)do  not significantly interfere with this assay.   However, presence of multiple variants may affect accuracy.     12/19/2018 7.1 (H) 4.0 - 5.6 % Final     Comment:     ADA Screening Guidelines:  5.7-6.4%  Consistent with prediabetes  >or=6.5%  Consistent with diabetes  High levels of fetal hemoglobin interfere with the HbA1C  assay. Heterozygous hemoglobin variants (HbS, HgC, etc)do  not significantly interfere with this assay.   However, presence of multiple variants may affect accuracy.         Review of Systems   Constitution: Negative for chills, decreased appetite, fever and malaise/fatigue.   HENT: Negative for congestion, ear discharge and sore throat.    Eyes: Negative for discharge and pain.   Cardiovascular: Negative for chest pain, claudication and leg swelling.   Respiratory: Negative for cough and shortness of breath.    Skin: Positive for color change. Negative for nail changes and rash.   Musculoskeletal: Negative for arthritis, joint pain, joint swelling and muscle weakness.   Gastrointestinal: Negative for bloating, abdominal pain, diarrhea, nausea and vomiting.   Genitourinary: Negative for flank pain and hematuria.   Neurological: Positive for numbness. Negative for headaches and weakness.   Psychiatric/Behavioral: Negative for altered mental status.             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, with long-term current use of insulin 2/22/2018    COPD (chronic obstructive pulmonary disease)     COPD exacerbation 4/8/2018    Defibrillator discharge     Diabetes mellitus     Diabetes mellitus type II     Diabetes with neurologic complications     Goiter     MNG    HX: breast cancer     Hyperlipidemia     Hypertension     Iron deficiency anemia 5/16/2017    Left kidney mass     Meningioma     Osteoporosis, postmenopausal     Pacemaker     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    Skin cancer     s/p excision    Sleep apnea     CPAP    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/2015    mid forehead    Unspecified vitamin D deficiency     Ventricular tachycardia     Vitamin B12 deficiency     Vitamin D deficiency disease        Past Surgical History:   Procedure Laterality Date    BASAL CELL CARCINOMA EXCISION      posterior neck and nose    BREAST BIOPSY      BREAST CYST EXCISION Left     BREAST SURGERY      CARDIAC DEFIBRILLATOR PLACEMENT      x 2    CATARACT EXTRACTION W/  INTRAOCULAR LENS IMPLANT Bilateral     CHOLECYSTECTOMY      COLONOSCOPY N/A 11/5/2019    Procedure: COLONOSCOPY;  Surgeon: Boaz Botello MD;  Location: [facility] ([address] [place] [address]);  Service: Endoscopy;  Laterality: N/A;  AICD - [facility] -     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    FRACTURE SURGERY      left elbow and wrist as a child    HYSTERECTOMY       MASTECTOMY Right     REPLACEMENT OF IMPLANTABLE CARDIOVERTER-DEFIBRILLATOR (ICD) GENERATOR N/A 12/17/2018    Procedure: REPLACEMENT, ICD GENERATOR;  Surgeon: Jan Mckeon MD;  Location: [facility];  Service: Cardiology;  Laterality: N/A;  DONNA, CRT-D gen change, VICKEY, MAC, SK, 3 Prep    REVISION OF SKIN POCKET FOR CARDIOVERTER-DEFIBRILLATOR  12/17/2018    Procedure: Revision, Skin Pocket, For Cardioverter-Defibrillator;  Surgeon: Jan Mckeon MD;  Location: [facility];  Service: Cardiology;;    SQUAMOUS CELL CARCINOMA EXCISION      remved from forehead    TONSILLECTOMY         Family History   Problem Relation Age of Onset    Diabetes Father     Heart disease Father     Diabetes Sister     Heart disease Sister     Diabetes Brother     Heart disease Brother     Hypertension Brother     Diabetes Brother     Heart disease Brother     Hypertension Brother     Diabetes Brother     Heart disease Brother     Cancer Brother         colon    Diabetes Brother     Cancer Son         skin    Diabetes Son         prediabetes    Diabetes Daughter         prediabetes    Cancer Daughter         melanoma    Obesity Daughter     Melanoma Daughter     Diabetes Son     Asthma Mother     Hypertension Mother     Stroke Mother     No Known Problems Maternal Grandmother     No Known Problems Maternal Grandfather     No Known Problems Paternal Grandmother     No Known Problems Paternal Grandfather     Amblyopia Neg Hx     Blindness Neg Hx     Cataracts Neg Hx     Glaucoma Neg Hx     Macular degeneration Neg Hx     Retinal detachment Neg Hx     Strabismus Neg Hx     Thyroid disease Neg Hx        Social History     Socioeconomic History    Marital status:      Spouse name: Not on file    Number of children: Not on file    Years of education: Not on file    Highest education level: Not on file   Occupational History    Occupation: Homemaker     Employer: OTHER   Social Needs    Financial  resource strain: Not on file    Food insecurity:     Worry: Not on file     Inability: Not on file    Transportation needs:     Medical: Not on file     Non-medical: Not on file   Tobacco Use    Smoking status: Never Smoker    Smokeless tobacco: Never Used   Substance and Sexual Activity    Alcohol use: No     Alcohol/week: 0.0 standard drinks    Drug use: No    Sexual activity: Yes     Partners: Male   Lifestyle    Physical activity:     Days per week: Not on file     Minutes per session: Not on file    Stress: Not on file   Relationships    Social connections:     Talks on phone: Not on file     Gets together: Not on file     Attends Congregation service: Not on file     Active member of club or organization: Not on file     Attends meetings of clubs or organizations: Not on file     Relationship status: Not on file   Other Topics Concern    Are you pregnant or think you may be? No    Breast-feeding No   Social History Narrative    Not on file       Current Outpatient Medications   Medication Sig Dispense Refill    ACCU-CHEK HECTOR CONTROL SOLN Soln       ACCU-CHEK HECTOR PLUS METER Misc       aspirin (ENTERIC COATED ASPIRIN) 81 MG EC tablet Take 1 tablet by mouth once daily.       benzonatate (TESSALON PERLES) 100 MG capsule Take 2 capsules (200 mg total) by mouth 3 (three) times daily as needed for Cough. 90 capsule 3    blood sugar diagnostic (ACCU-CHEK HECTOR) Strp Uses Accu-Check Hector meter to test BG 4x/day 400 strip 6    carvedilol (COREG) 25 MG tablet TAKE 2 TABLETS (50 MG TOTAL) BY MOUTH 2 (TWO) TIMES DAILY. 360 tablet 3    chlorthalidone (HYGROTEN) 25 MG Tab Take 1 tablet (25 mg total) by mouth once daily. 90 tablet 3    cholecalciferol, vitamin D3, (VITAMIN D3) 2,000 unit Tab Take by mouth once daily.      cloNIDine 0.1 mg/24 hr td ptwk (CATAPRES) 0.1 mg/24 hr Place 1 patch onto the skin every 7 days. 4 patch 11    CYANOCOBALAMIN, VITAMIN B-12, (B-12 DOTS ORAL) Take 1 tablet by mouth  "once daily.      diltiaZEM (CARDIZEM CD) 120 MG Cp24 Take 1 capsule (120 mg total) by mouth once daily. 90 capsule 3    fluticasone (FLONASE) 50 mcg/actuation nasal spray 2 sprays (100 mcg total) by Each Nare route once daily. 1 Bottle 12    fluticasone-salmeterol diskus inhaler 250-50 mcg Inhale 1 puff into the lungs 2 (two) times daily. This is a change from one month 180 each 3    GAVILYTE-G 236-22.74-6.74 -5.86 gram suspension       hydrALAZINE (APRESOLINE) 100 MG tablet TAKE 1 TABLET (100 MG TOTAL) BY MOUTH 3 (THREE) TIMES DAILY. 270 tablet 2    insulin (LANTUS SOLOSTAR U-100 INSULIN) glargine 100 units/mL (3mL) SubQ pen Inject 34 Units into the skin every evening. 45 mL 6    irbesartan (AVAPRO) 300 MG tablet Take 1 tablet (300 mg total) by mouth every evening. 90 tablet 3    lancets (ACCU-CHEK SOFTCLIX LANCETS) Misc Uses Accu-Chek Angelica meter to test BG 4x/day 400 each 3    lancets 30 gauge Misc       lancing device Misc       levocetirizine (XYZAL) 5 MG tablet Take 1 tablet (5 mg total) by mouth every evening. 30 tablet 11    linaGLIPtin (TRADJENTA) 5 mg Tab tablet Take 1 tablet (5 mg total) by mouth once daily. 90 tablet 3    magnesium oxide (MAG-OX) 400 mg tablet Take 1 tablet (400 mg total) by mouth once daily.  0    metFORMIN (GLUCOPHAGE) 500 MG tablet Take 1 tablet (500 mg total) by mouth 2 (two) times daily with meals. 180 tablet 6    nitroGLYCERIN (NITROSTAT) 0.4 MG SL tablet Place 1 tablet under the tongue continuous prn.        omega-3 fatty acids (FISH OIL) 500 mg Cap Take 1 capsule by mouth Twice daily.      pen needle, diabetic (BD ULTRA-FINE MINI PEN NEEDLE) 31 gauge x 3/16" Ndle USE WITH LANTUS AT BEDTIME 400 each 3    pravastatin (PRAVACHOL) 40 MG tablet Take 1 tablet (40 mg total) by mouth once daily. 90 tablet 3    VENTOLIN HFA 90 mcg/actuation inhaler INHALE 2 PUFFS INTO THE LUNGS EVERY 6 (SIX) HOURS AS NEEDED FOR WHEEZING. RESCUE 18 Inhaler 12    azelastine (ASTELIN) 137 " "mcg (0.1 %) nasal spray 1 spray (137 mcg total) by Nasal route 2 (two) times daily. For severe allergy 30 mL 1     Current Facility-Administered Medications   Medication Dose Route Frequency Provider Last Rate Last Dose    acetaminophen tablet 650 mg  650 mg Oral Q4H PRN Samy Rodriguez MD         Facility-Administered Medications Ordered in Other Visits   Medication Dose Route Frequency Provider Last Rate Last Dose    0.9%  NaCl infusion   Intravenous Continuous Chela Horne NP           Review of patient's allergies indicates:   Allergen Reactions    Iodine and iodide containing products Hives    Nifedipine      weakness       Vitals:    11/06/19 1307   BP: (!) 108/54   Pulse: 91   Weight: 72.6 kg (160 lb)   Height: 5' 3" (1.6 m)   PainSc: 0-No pain       Objective:      Physical Exam   Constitutional: She is oriented to person, place, and time. She appears well-developed and well-nourished. No distress.   HENT:   Nose: Nose normal.   Eyes: Conjunctivae are normal.   Neck: Normal range of motion.   Pulmonary/Chest: Effort normal. She exhibits no tenderness.   Abdominal: There is no tenderness.   Neurological: She is alert and oriented to person, place, and time.   Psychiatric: She has a normal mood and affect. Her behavior is normal.       Vascular: Distal DP/PT pulses palpable 2/4. CRT < 3 sec to tips of toes. No vericosities noted to LEs. Hair growth present LE, warm to touch LE, No edema noted to LE.    Dermatologic: No open lesions, lacerations or wounds. Interdigital spaces clean, dry and intact. No erythema, rubor, calor noted LE    Musculoskeletal: MMT 5/5 in DF/PF/Inv/Ev resistance with no reproduction of pain in any direction. Passive range of motion of ankle and pedal joints is painless. No calf tenderness LE, Compartments soft/compressible.     Neurological: Light touch, proprioception, and sharp/dull sensation are all intact. Protective threshold with the Pettibone-Wienstein monofilament is " intact. Vibratory sensation intact.         Assessment:       Encounter Diagnosis   Name Primary?    Type II diabetes mellitus with neurological manifestations Yes         Plan:       Myesha was seen today for diabetes mellitus and diabetic foot exam.    Diagnoses and all orders for this visit:    Type II diabetes mellitus with neurological manifestations      I counseled the patient on her conditions, their implications and medical management.    79 y.o. female with diabetic foot risk assessment.    -nails times 10 debrided with nail Nipper (patient does not have mycotic nail).  Tolerated well.   -It was discussed the importance of wearing shoes with adequate room in toe box to accommodate toe deformities. Recommended New Balance/Asics shoe brands with adequate arch supports to alleviate abnormal pressure and improve stability of foot while walking. Avoid flat shoes and barefoot walking as these will exacerbate or worsen symptoms.   -Shoe inspection. General Foot Education. Patient reminded of the importance of good nutrition. Patient instructed on proper foot hygeine. We discussed wearing proper shoe gear, daily foot inspections, never walking without protective shoe gear, caution putting sharp instruments to feet. Discussed general foot care:  Wear comfortable, proper fitting shoes. Wash feet daily. Dry well. After drying, apply moisturizer to feet (no lotion to webspaces). Inspect feet daily for skin breaks, blisters, swelling, or redness. Wear cotton socks (preferably white)  Change socks every day. Do NOT walk barefoot. Do NOT use heating pads or warm/hot water soaks   -Advised for optimal glucose control and maintenance per primary care physician. Patient was also educated on healthy diet that is naturally rich in nutrients and low in fat and calories.   -The nature of the condition, options for management, as well as potential risks and complications were discussed in detail with patient. Patient was  amenable to my recommendations and left my office fully informed and will follow up as instructed or sooner if necessary.    -Patient was advised of signs and symptoms of infection including redness, drainage, purulence, odor, streaking, fever, chills and I advised patient to seek medical attention (ER or urgent care) if these symptoms arise.   -f/u 6 months       Note dictated with voice recognition software, please excuse any grammatical errors.

## 2019-11-07 ENCOUNTER — OFFICE VISIT (OUTPATIENT)
Dept: OPHTHALMOLOGY | Facility: CLINIC | Age: 79
End: 2019-11-07
Payer: MEDICARE

## 2019-11-07 VITALS — HEART RATE: 81 BPM | SYSTOLIC BLOOD PRESSURE: 175 MMHG | DIASTOLIC BLOOD PRESSURE: 72 MMHG

## 2019-11-07 DIAGNOSIS — H35.033 HYPERTENSIVE RETINOPATHY, BILATERAL: ICD-10-CM

## 2019-11-07 DIAGNOSIS — Z79.4 CONTROLLED TYPE 2 DIABETES MELLITUS WITH BOTH EYES AFFECTED BY MILD NONPROLIFERATIVE RETINOPATHY AND MACULAR EDEMA, WITH LONG-TERM CURRENT USE OF INSULIN: Primary | ICD-10-CM

## 2019-11-07 DIAGNOSIS — E11.3213 CONTROLLED TYPE 2 DIABETES MELLITUS WITH BOTH EYES AFFECTED BY MILD NONPROLIFERATIVE RETINOPATHY AND MACULAR EDEMA, WITH LONG-TERM CURRENT USE OF INSULIN: Primary | ICD-10-CM

## 2019-11-07 PROCEDURE — 92226 PR SPECIAL EYE EXAM, SUBSEQUENT: CPT | Mod: 50,HCNC,S$GLB, | Performed by: OPHTHALMOLOGY

## 2019-11-07 PROCEDURE — 92014 COMPRE OPH EXAM EST PT 1/>: CPT | Mod: HCNC,S$GLB,, | Performed by: OPHTHALMOLOGY

## 2019-11-07 PROCEDURE — 92014 PR EYE EXAM, EST PATIENT,COMPREHESV: ICD-10-PCS | Mod: HCNC,S$GLB,, | Performed by: OPHTHALMOLOGY

## 2019-11-07 PROCEDURE — 99999 PR PBB SHADOW E&M-EST. PATIENT-LVL III: CPT | Mod: PBBFAC,HCNC,, | Performed by: OPHTHALMOLOGY

## 2019-11-07 PROCEDURE — 99999 PR PBB SHADOW E&M-EST. PATIENT-LVL III: ICD-10-PCS | Mod: PBBFAC,HCNC,, | Performed by: OPHTHALMOLOGY

## 2019-11-07 PROCEDURE — 92134 CPTRZ OPH DX IMG PST SGM RTA: CPT | Mod: HCNC,S$GLB,, | Performed by: OPHTHALMOLOGY

## 2019-11-07 PROCEDURE — 92134 POSTERIOR SEGMENT OCT RETINA (OCULAR COHERENCE TOMOGRAPHY)-BOTH EYES: ICD-10-PCS | Mod: HCNC,S$GLB,, | Performed by: OPHTHALMOLOGY

## 2019-11-07 PROCEDURE — 92226 PR SPECIAL EYE EXAM, SUBSEQUENT: ICD-10-PCS | Mod: 50,HCNC,S$GLB, | Performed by: OPHTHALMOLOGY

## 2019-11-07 NOTE — PROGRESS NOTES
"HPI     DLS: 09/05/2019 Dr. Arechiga     Patient states her vision has been worse since having her injection. She   states her MELISSA has been drooping and watering a lot more.     No gtts     HPI     DLS: 09/05/2019 Randi Arechiga     Patient states her vision has been stable since her last visit. Denies   flashes, floaters, diplopia, photophobia, glare, HA's, or pain.    No gtts     HPI     DLS: 4/25/2019  Geni     Patient states her vision has been stable since her last visit.     No gtts       OCT - OD No ME  OS - central decrease    Prior FA - Foveal MA's OS   No NV of NP OU      A/P    1. 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 DME OS  S/p Avastin OS x 5  S/p Ozurdex OS x 5 9/19  S/p Iluvien 9/19  Foveal MA"s OS - may need chronic pharmacotherapy    Doing well, will need chronic steroid    Improved today - observe      3. HTN Ret OU  BS/BP/chol control    4. PCIOL OU  With small PCO      8 weeks OCT    "

## 2019-11-12 ENCOUNTER — TELEPHONE (OUTPATIENT)
Dept: ENDOSCOPY | Facility: HOSPITAL | Age: 79
End: 2019-11-12

## 2019-11-20 ENCOUNTER — CLINICAL SUPPORT (OUTPATIENT)
Dept: CARDIOLOGY | Facility: HOSPITAL | Age: 79
End: 2019-11-20
Attending: INTERNAL MEDICINE
Payer: MEDICARE

## 2019-11-20 DIAGNOSIS — Z95.810 AICD (AUTOMATIC CARDIOVERTER/DEFIBRILLATOR) PRESENT: ICD-10-CM

## 2019-11-20 PROCEDURE — 93295 DEV INTERROG REMOTE 1/2/MLT: CPT | Mod: ,,, | Performed by: INTERNAL MEDICINE

## 2019-11-20 PROCEDURE — 93295 CARDIAC DEVICE CHECK - REMOTE: ICD-10-PCS | Mod: ,,, | Performed by: INTERNAL MEDICINE

## 2019-11-20 PROCEDURE — 93296 REM INTERROG EVL PM/IDS: CPT | Mod: HCNC | Performed by: INTERNAL MEDICINE

## 2019-12-03 ENCOUNTER — OFFICE VISIT (OUTPATIENT)
Dept: DERMATOLOGY | Facility: CLINIC | Age: 79
End: 2019-12-03
Payer: MEDICARE

## 2019-12-03 DIAGNOSIS — L82.1 SK (SEBORRHEIC KERATOSIS): ICD-10-CM

## 2019-12-03 DIAGNOSIS — D48.5 NEOPLASM OF UNCERTAIN BEHAVIOR OF SKIN: Primary | ICD-10-CM

## 2019-12-03 DIAGNOSIS — L81.4 LENTIGO: ICD-10-CM

## 2019-12-03 DIAGNOSIS — L57.0 AK (ACTINIC KERATOSIS): ICD-10-CM

## 2019-12-03 DIAGNOSIS — Z85.828 HISTORY OF NONMELANOMA SKIN CANCER: ICD-10-CM

## 2019-12-03 PROCEDURE — 11102 PR TANGENTIAL BIOPSY, SKIN, SINGLE LESION: ICD-10-PCS | Mod: HCNC,S$GLB,, | Performed by: DERMATOLOGY

## 2019-12-03 PROCEDURE — 1159F PR MEDICATION LIST DOCUMENTED IN MEDICAL RECORD: ICD-10-PCS | Mod: HCNC,S$GLB,, | Performed by: DERMATOLOGY

## 2019-12-03 PROCEDURE — 17003 DESTRUCT PREMALG LES 2-14: CPT | Mod: 59,HCNC,S$GLB, | Performed by: DERMATOLOGY

## 2019-12-03 PROCEDURE — 1126F PR PAIN SEVERITY QUANTIFIED, NO PAIN PRESENT: ICD-10-PCS | Mod: HCNC,S$GLB,, | Performed by: DERMATOLOGY

## 2019-12-03 PROCEDURE — 99213 PR OFFICE/OUTPT VISIT, EST, LEVL III, 20-29 MIN: ICD-10-PCS | Mod: 25,HCNC,S$GLB, | Performed by: DERMATOLOGY

## 2019-12-03 PROCEDURE — 88305 TISSUE EXAM BY PATHOLOGIST: CPT | Mod: 26,HCNC,, | Performed by: PATHOLOGY

## 2019-12-03 PROCEDURE — 99999 PR PBB SHADOW E&M-EST. PATIENT-LVL II: CPT | Mod: PBBFAC,HCNC,, | Performed by: DERMATOLOGY

## 2019-12-03 PROCEDURE — 17000 DESTRUCT PREMALG LESION: CPT | Mod: 59,HCNC,S$GLB, | Performed by: DERMATOLOGY

## 2019-12-03 PROCEDURE — 88305 TISSUE EXAM BY PATHOLOGIST: ICD-10-PCS | Mod: 26,HCNC,, | Performed by: PATHOLOGY

## 2019-12-03 PROCEDURE — 99213 OFFICE O/P EST LOW 20 MIN: CPT | Mod: 25,HCNC,S$GLB, | Performed by: DERMATOLOGY

## 2019-12-03 PROCEDURE — 1101F PR PT FALLS ASSESS DOC 0-1 FALLS W/OUT INJ PAST YR: ICD-10-PCS | Mod: HCNC,CPTII,S$GLB, | Performed by: DERMATOLOGY

## 2019-12-03 PROCEDURE — 1126F AMNT PAIN NOTED NONE PRSNT: CPT | Mod: HCNC,S$GLB,, | Performed by: DERMATOLOGY

## 2019-12-03 PROCEDURE — 17000 PR DESTRUCTION(LASER SURGERY,CRYOSURGERY,CHEMOSURGERY),PREMALIGNANT LESIONS,FIRST LESION: ICD-10-PCS | Mod: 59,HCNC,S$GLB, | Performed by: DERMATOLOGY

## 2019-12-03 PROCEDURE — 99999 PR PBB SHADOW E&M-EST. PATIENT-LVL II: ICD-10-PCS | Mod: PBBFAC,HCNC,, | Performed by: DERMATOLOGY

## 2019-12-03 PROCEDURE — 11102 TANGNTL BX SKIN SINGLE LES: CPT | Mod: HCNC,S$GLB,, | Performed by: DERMATOLOGY

## 2019-12-03 PROCEDURE — 1159F MED LIST DOCD IN RCRD: CPT | Mod: HCNC,S$GLB,, | Performed by: DERMATOLOGY

## 2019-12-03 PROCEDURE — 17003 DESTRUCTION, PREMALIGNANT LESIONS; SECOND THROUGH 14 LESIONS: ICD-10-PCS | Mod: 59,HCNC,S$GLB, | Performed by: DERMATOLOGY

## 2019-12-03 PROCEDURE — 88305 TISSUE EXAM BY PATHOLOGIST: CPT | Mod: HCNC | Performed by: PATHOLOGY

## 2019-12-03 PROCEDURE — 1101F PT FALLS ASSESS-DOCD LE1/YR: CPT | Mod: HCNC,CPTII,S$GLB, | Performed by: DERMATOLOGY

## 2019-12-03 NOTE — PROGRESS NOTES
Subjective:       Patient ID:  Myesha Quinones is a 79 y.o. female who presents for   Chief Complaint   Patient presents with    Skin Check     UBSE      History of Present Illness: The patient presents for follow up of skin check.    This is a high risk patient here to check for the development of new lesions.    Patient complains of lesion- red spot   Location: Left forehead and right post-auricular  Duration: months  Symptoms: asymptomatic   Relieving factors/Previous treatments: neosporin    Additional: Patient presents for UBSE. Last seen one year ago 01/2019 for UBSE and has history of AKs.     History of NMSC:  mid forehead- INVASIVE SQUAMOUS CELL CARCINOMA and removed by Dr. Roche in 12/2015.         Review of Systems   Skin: Positive for daily sunscreen use (makeup with sunscreen) and activity-related sunscreen use. Negative for recent sunburn.   Hematologic/Lymphatic: Bruises/bleeds easily.        Objective:    Physical Exam   Constitutional: She appears well-developed and well-nourished. No distress.   Neurological: She is alert and oriented to person, place, and time. She is not disoriented.   Psychiatric: She has a normal mood and affect.   Skin:   Areas Examined (abnormalities noted in diagram):   Scalp / Hair Palpated and Inspected  Head / Face Inspection Performed  Neck Inspection Performed  Chest / Axilla Inspection Performed  Back Inspection Performed  RUE Inspected  LUE Inspection Performed  Nails and Digits Inspection Performed                       Diagram Legend     Erythematous scaling macule/papule c/w actinic keratosis       Vascular papule c/w angioma      Pigmented verrucoid papule/plaque c/w seborrheic keratosis      Yellow umbilicated papule c/w sebaceous hyperplasia      Irregularly shaped tan macule c/w lentigo     1-2 mm smooth white papules consistent with Milia      Movable subcutaneous cyst with punctum c/w epidermal inclusion cyst      Subcutaneous movable cyst c/w pilar  cyst      Firm pink to brown papule c/w dermatofibroma      Pedunculated fleshy papule(s) c/w skin tag(s)      Evenly pigmented macule c/w junctional nevus     Mildly variegated pigmented, slightly irregular-bordered macule c/w mildly atypical nevus      Flesh colored to evenly pigmented papule c/w intradermal nevus       Pink pearly papule/plaque c/w basal cell carcinoma      Erythematous hyperkeratotic cursted plaque c/w SCC      Surgical scar with no sign of skin cancer recurrence      Open and closed comedones      Inflammatory papules and pustules      Verrucoid papule consistent consistent with wart     Erythematous eczematous patches and plaques     Dystrophic onycholytic nail with subungual debris c/w onychomycosis     Umbilicated papule    Erythematous-base heme-crusted tan verrucoid plaque consistent with inflamed seborrheic keratosis     Erythematous Silvery Scaling Plaque c/w Psoriasis     See annotation          Assessment / Plan:      Pathology Orders:     Normal Orders This Visit    Specimen to Pathology, Dermatology     Questions:    Procedure Type:  Dermatology and skin neoplasms    Number of Specimens:  1    ------------------------:  -------------------------    Spec 1 Procedure:  Biopsy    Spec 1 Clinical Impression:  r/o bcc    Spec 1 Source:  left ant scalp        Neoplasm of uncertain behavior of skin  -     Specimen to Pathology, Dermatology    Shave biopsy procedure note:    Shave biopsy performed after verbal consent including risk of infection, scar, recurrence, need for additional treatment of site. Area prepped with alcohol, anesthetized with approximately 1.0cc of 1% lidocaine with epinephrine. Lesional tissue shaved with razor blade. Hemostasis achieved with application of aluminum chloride followed by hyfrecation. No complications. Dressing applied. Wound care explained.    If biopsy positive for malignancy, refer to Dr. Roche for Mohs surgery consultation.      AK (actinic  keratosis)  -     Photodynamic Therapy; Future 90 min - face  And  Cryosurgery Procedure Note    Verbal consent from the patient is obtained including, but not limited to, risk of hypopigmentation/hyperpigmentation, scar, recurrence of lesion. The patient is aware of the precancerous quality and need for treatment of these lesions. Liquid nitrogen cryosurgery is applied to the 5 actinic keratoses, as detailed in the physical exam, to produce a freeze injury. The patient is aware that blisters may form and is instructed on wound care with gentle cleansing and use of vaseline ointment to keep moist until healed. The patient is supplied a handout on cryosurgery and is instructed to call if lesions do not completely resolve.      SK (seborrheic keratosis)  These are benign inherited growths without a malignant potential. Reassurance given to patient. No treatment is necessary.     Lentigo   - stable and chronic    History of nonmelanoma skin cancer  Area(s) of previous NMSC evaluated with no signs of recurrence.    Upper body skin examination performed today including at least 6 points as noted in physical examination. Suspicious lesions noted.             Follow up in about 3 months (around 3/3/2020) for after PDT.

## 2019-12-03 NOTE — PATIENT INSTRUCTIONS
" Shave Biopsy Wound Care    Your doctor has performed a shave biopsy today.  A band aid and vaseline ointment has been placed over the site.  This should remain in place for 24 hours.  It is recommended that you keep the area dry for the first 24 hours.  After 24 hours, you may remove the band aid and wash the area with warm soap and water and apply Vaseline jelly.  Many patients prefer to use Neosporin or Bacitracin ointment.  This is acceptable; however, know that you can develop an allergy to this medication even if you have used it safely for years.  It is important to keep the area moist.  Letting it dry out and get air slows healing time, and will worsen the scar.  Band aid is optional after first 24 hours.      If you notice increasing redness, tenderness, pain, or yellow drainage at the biopsy site, please notify your doctor.  These are signs of an infection.    If your biopsy site is bleeding, apply firm pressure for 15 minutes straight.  Repeat for another 15 minutes, if it is still bleeding.   If the surgical site continues to bleed, then please contact your doctor.      For [facility] users:   You will receive a [facility] notification after the pathologist has finished reviewing your biopsy specimen. Pathology results, however, will not be released online so you will see a "no content" message. Once your dermatologist reviews and clinically correlates your biopsy results, you will either receive a letter in the mail with the results of a phone call from your doctor's office if further explanation or treatment is warranted.       [address] [place] [address]/ [phone] FAX/ [email or web address]        "

## 2019-12-05 ENCOUNTER — OFFICE VISIT (OUTPATIENT)
Dept: URGENT CARE | Facility: CLINIC | Age: 79
End: 2019-12-05
Payer: MEDICARE

## 2019-12-05 VITALS
SYSTOLIC BLOOD PRESSURE: 126 MMHG | DIASTOLIC BLOOD PRESSURE: 68 MMHG | HEIGHT: 63 IN | BODY MASS INDEX: 28.35 KG/M2 | WEIGHT: 160 LBS | RESPIRATION RATE: 18 BRPM | HEART RATE: 89 BPM | TEMPERATURE: 99 F | OXYGEN SATURATION: 95 %

## 2019-12-05 DIAGNOSIS — M54.50 ACUTE BILATERAL LOW BACK PAIN WITHOUT SCIATICA: ICD-10-CM

## 2019-12-05 DIAGNOSIS — N39.0 URINARY TRACT INFECTION WITHOUT HEMATURIA, SITE UNSPECIFIED: Primary | ICD-10-CM

## 2019-12-05 DIAGNOSIS — R05.9 COUGH: ICD-10-CM

## 2019-12-05 DIAGNOSIS — R11.2 NON-INTRACTABLE VOMITING WITH NAUSEA, UNSPECIFIED VOMITING TYPE: ICD-10-CM

## 2019-12-05 DIAGNOSIS — R19.7 DIARRHEA, UNSPECIFIED TYPE: ICD-10-CM

## 2019-12-05 LAB
BILIRUB UR QL STRIP: POSITIVE
CTP QC/QA: YES
FLUAV AG NPH QL: NEGATIVE
FLUBV AG NPH QL: NEGATIVE
GLUCOSE UR QL STRIP: NEGATIVE
KETONES UR QL STRIP: NEGATIVE
LEUKOCYTE ESTERASE UR QL STRIP: POSITIVE
PH, POC UA: 5.5 (ref 5–8)
POC BLOOD, URINE: NEGATIVE
POC NITRATES, URINE: NEGATIVE
PROT UR QL STRIP: POSITIVE
SP GR UR STRIP: 1.02 (ref 1–1.03)
UROBILINOGEN UR STRIP-ACNC: NORMAL (ref 0.1–1.1)

## 2019-12-05 PROCEDURE — 99214 PR OFFICE/OUTPT VISIT, EST, LEVL IV, 30-39 MIN: ICD-10-PCS | Mod: 25,S$GLB,, | Performed by: NURSE PRACTITIONER

## 2019-12-05 PROCEDURE — 99214 OFFICE O/P EST MOD 30 MIN: CPT | Mod: 25,S$GLB,, | Performed by: NURSE PRACTITIONER

## 2019-12-05 PROCEDURE — 87804 POCT INFLUENZA A/B: ICD-10-PCS | Mod: 59,QW,S$GLB, | Performed by: NURSE PRACTITIONER

## 2019-12-05 PROCEDURE — 87804 INFLUENZA ASSAY W/OPTIC: CPT | Mod: QW,S$GLB,, | Performed by: NURSE PRACTITIONER

## 2019-12-05 PROCEDURE — 81003 POCT URINALYSIS, DIPSTICK, AUTOMATED, W/O SCOPE: ICD-10-PCS | Mod: QW,S$GLB,, | Performed by: NURSE PRACTITIONER

## 2019-12-05 PROCEDURE — 74018 XR KUB: ICD-10-PCS | Mod: FY,S$GLB,, | Performed by: RADIOLOGY

## 2019-12-05 PROCEDURE — 81003 URINALYSIS AUTO W/O SCOPE: CPT | Mod: QW,S$GLB,, | Performed by: NURSE PRACTITIONER

## 2019-12-05 PROCEDURE — 74018 RADEX ABDOMEN 1 VIEW: CPT | Mod: FY,S$GLB,, | Performed by: RADIOLOGY

## 2019-12-05 RX ORDER — NITROFURANTOIN 25; 75 MG/1; MG/1
100 CAPSULE ORAL 2 TIMES DAILY
Qty: 10 CAPSULE | Refills: 0 | Status: SHIPPED | OUTPATIENT
Start: 2019-12-05 | End: 2019-12-07 | Stop reason: ALTCHOICE

## 2019-12-05 RX ORDER — ONDANSETRON 4 MG/1
4 TABLET, ORALLY DISINTEGRATING ORAL EVERY 6 HOURS PRN
Qty: 30 TABLET | Refills: 0 | Status: SHIPPED | OUTPATIENT
Start: 2019-12-05 | End: 2020-07-27

## 2019-12-05 NOTE — PATIENT INSTRUCTIONS
PLEASE READ YOUR DISCHARGE INSTRUCTIONS ENTIRELY AS IT CONTAINS IMPORTANT INFORMATION.    Take the zofran for nausea (it dissolves under your tongue).    Use gatorade/pedialyte or rehydration packets to help stay hydrated. Vitamin water and plain water do not contain rehydrating electrolytes.  Increase clear liquids (water, gatorade, pedialyte, broths, jello, etc) Hold off on solids for 12-18 hours. Then advance to BRAT diet (banana, rice, applesauce, tea, toast/crackers), then advance further as tolerated. Avoid spicy or fatty foods.   Use Peptobismol to help alleviate your diarrhea symptoms.     Avoid imodium unless you have more than 6 loose stools in 24 hours.       Wash hands frequently while sick. Avoid ibuprofen or other NSAIDS until you are well.     Please go to the ER if you experience worsening pain, blood in your vomit or stool, high fever, dizziness, fainting, swelling of your abdomen, inability to pass gas or stool.     Take the antibiotics to completion.     Drink plenty of fluids, wipe front to back, take showers not baths, no scented soaps, wear breathable cotton underwear, urinate after sexual intercourse.     A urine culture was sent. You will be contacted once it results and appropriate action will be taken if needed.     Please go to the ER for worsening symptoms including fever, worsening flank pain, vomiting, etc.       Please return or see your primary care doctor if you develop new or worsening symptoms.     Please arrange follow up with your primary medical clinic as soon as possible. You must understand that you've received an Urgent Care treatment only and that you may be released before all of your medical problems are known or treated. You, the patient, will arrange for follow up as instructed. If your symptoms worsen or fail to improve you should go to the Emergency Room.  WE CANNOT RULE OUT ALL POSSIBLE CAUSES OF YOUR SYMPTOMS IN THE URGENT CARE SETTING PLEASE GO TO THE ER IF YOU  FEELS YOUR CONDITION IS WORSENING OR YOU WOULD LIKE EMERGENT EVALUATION.        Please arrange follow up with your primary medical clinic as soon as possible. You must understand that you've received an Urgent Care treatment only and that you may be released before all of your medical problems are known or treated. You, the patient, will arrange for follow up as instructed. If your symptoms worsen or fail to improve you should go to the Emergency Room.  WE CANNOT RULE OUT ALL POSSIBLE CAUSES OF YOUR SYMPTOMS IN THE URGENT CARE SETTING PLEASE GO TO THE ER IF YOU FEELS YOUR CONDITION IS WORSENING OR YOU WOULD LIKE EMERGENT EVALUATION.        Gastroenteritis (Adult)    Gastroenteritis is commonly called the stomach flu. It is most often caused by a virus that affects the stomach and intestinal tract and usually lasts from 2 to 7 days. Common viruses causing gastroenteritis include norovirus, rotavirus, and hepatitis A. Non-viral causes of gastroenteritis include bacteria, parasites, and toxins.  The danger from repeated vomiting or diarrhea is dehydration. This is the loss of too much fluid from the body. When this occurs, body fluids must be replaced. Antibiotics do not help with this illness because it is usually viral.Simple home treatment will be helpful.  Symptoms of viral gastroenteritis may include:  · Watery, loose stools  · Stomach pain or abdominal cramps  · Fever and chills  · Nausea and vomiting  · Loss of bowel control  · Headache  Home care  Gastroenteritis is transmitted by contact with the stool or vomit of an infected person. This can occur from person to person or from contact with a contaminated surface.  Follow these guidelines when caring for yourself at home:  · If symptoms are severe, rest at home for the next 24 hours or until you are feeling better.  · Wash your hands with soap and water or use alcohol-based  to prevent the spread of infection. Wash your hands after touching anyone who  is sick.  · Wash your hands or use alcohol-based  after using the toilet and before meals. Clean the toilet after each use.  Remember these tips when preparing food:  · People with diarrhea should not prepare or serve food to others. When preparing foods, wash your hands before and after.  · Wash your hands after using cutting boards, countertops, knives, or utensils that have been in contact with raw food.  · Keep uncooked meats away from cooked and ready-to-eat foods.  Medicine  You may use acetaminophen or NSAID medicines like ibuprofen or naproxen to control fever unless another medicine was given. If you have chronic liver or kidney disease, talk with your healthcare provider before using these medicines. Also talk with your provider if you've had a stomach ulcer or gastrointestinal bleeding. Don't give aspirin to anyone under 18 years of age who is ill with a fever. It may cause severe liver damage. Don't use NSAIDS is you are already taking one for another condition (like arthritis) or are on aspirin (such as for heart disease or after a stroke).  If medicine for vomiting or diarrhea are prescribed, take these only as directed. Do not take over-the-counter medicines for vomiting or diarrhea unless instructed by your healthcare provider.  Diet  Follow these guidelines for food:  · Water and liquids are important so you don't get dehydrated. Drink a small amount at a time or suck on ice chips if you are vomiting.  · If you eat, avoid fatty, greasy, spicy, or fried foods.  · Don't eat dairy if you have diarrhea. This can make diarrhea worse.  · Avoid tobacco, alcohol, and caffeine which may worsen symptoms.  During the first 24 hours (the first full day), follow the diet below:  · Beverages. Sports drinks, soft drinks without caffeine, ginger ale, mineral water (plain or flavored), decaffeinated tea and coffee. If you are very dehydrated, sports drinks aren't a good choice. They have too much sugar and  not enough electrolytes. In this case, commercially available products called oral rehydration solutions, are best.  · Soups. Eat clear broth, consommé, and bouillon.  · Desserts. Eat gelatin, popsicles, and fruit juice bars.  During the next 24 hours (the second day), you may add the following to the above:  · Hot cereal, plain toast, bread, rolls, and crackers  · Plain noodles, rice, mashed potatoes, chicken noodle or rice soup  · Unsweetened canned fruit (avoid pineapple), bananas  · Limit fat intake to less than 15 grams per day. Do this by avoiding margarine, butter, oils, mayonnaise, sauces, gravies, fried foods, peanut butter, meat, poultry, and fish.  · Limit fiber and avoid raw or cooked vegetables, fresh fruits (except bananas), and bran cereals.  · Limit caffeine and chocolate. Don't use spices or seasonings other than salt.  · Limit dairy products.  · Avoid alcohol.  During the next 24 hours:  · Gradually resume a normal diet as you feel better and your symptoms improve.  · If at any time it starts getting worse again, go back to clear liquids until you feel better.  Follow-up care  Follow up with your healthcare provider, or as advised. Call your provider if you don't get better within 24 hours or if diarrhea lasts more than a week. Also follow up if you are unable to keep down liquids and get dehydrated. If a stool (diarrhea) sample was taken, call as directed for the results.  Call 911  Call 911 if any of these occur:  · Trouble breathing  · Chest pain  · Confused  · Severe drowsiness or trouble awakening  · Fainting or loss of consciousness  · Rapid heart rate  · Seizure  · Stiff neck  When to seek medical advice  Call your healthcare provider right away if any of these occur:  · Abdominal pain that gets worse  · Continued vomiting (unable to keep liquids down)  · Frequent diarrhea (more than 5 times a day)  · Blood in vomit or stool (black or red color)  · Dark urine, reduced urine output, or  "extreme thirst  · Weakness or dizziness  · Drowsiness  · Fever of 100.4°F (38°C) oral or higher that does not get better with fever medicine  · New rash  Date Last Reviewed: 1/3/2016  © 9575-5945 [employer]. [address] [place] [address], [place] [address]. All rights reserved. This information is not intended as a substitute for professional medical care. Always follow your healthcare professional's instructions.        Bladder Infection, Female (Adult)    Urine is normally doesn't have any bacteria in it. But bacteria can get into the urinary tract from the skin around the rectum. Or they can travel in the blood from elsewhere in the body. Once they are in your urinary tract, they can cause infection in the urethra (urethritis), the bladder (cystitis), or the kidneys (pyelonephritis).  The most common place for an infection is in the bladder. This is called a bladder infection. This is one of the most common infections in women. Most bladder infections are easily treated. They are not serious unless the infection spreads to the kidney.  The phrases "bladder infection," "UTI," and "cystitis" are often used to describe the same thing. But they are not always the same. Cystitis is an inflammation of the bladder. The most common cause of cystitis is an infection.  Symptoms  The infection causes inflammation in the urethra and bladder. This causes many of the symptoms. The most common symptoms of a bladder infection are:  · Pain or burning when urinating  · Having to urinate more often than usual  · Urgent need to urinate  · Only a small amount of urine comes out  · Blood in urine  · Abdominal discomfort. This is usually in the lower abdomen above the pubic bone.  · Cloudy urine  · Strong- or bad-smelling urine  · Unable to urinate (urinary retention)  · Unable to hold urine in (urinary incontinence)  · Fever  · Loss of appetite  · Confusion (in older adults)  Causes  Bladder infections are not contagious. You " can't get one from someone else, from a toilet seat, or from sharing a bath.  The most common cause of bladder infections is bacteria from the bowels. The bacteria get onto the skin around the opening of the urethra. From there, they can get into the urine and travel up to the bladder, causing inflammation and infection. This usually happens because of:  · Wiping improperly after urinating. Always wipe from front to back.  · Bowel incontinence  · Pregnancy  · Procedures such as having a catheter inserted  · Older age  · Not emptying your bladder. This can allow bacteria a chance to grow in your urine.  · Dehydration  · Constipation  · Sex  · Use of a diaphragm for birth control   Treatment  Bladder infections are diagnosed by a urine test. They are treated with antibiotics and usually clear up quickly without complications. Treatment helps prevent a more serious kidney infection.  Medicines  Medicines can help in the treatment of a bladder infection:  · Take antibiotics until they are used up, even if you feel better. It is important to finish them to make sure the infection has cleared.  · You can use acetaminophen or ibuprofen for pain, fever, or discomfort, unless another medicine was prescribed. If you have chronic liver or kidney disease, talk with your healthcare provider before using these medicines. Also talk with your provider if you've ever had a stomach ulcer or gastrointestinal bleeding, or are taking blood-thinner medicines.  · If you are given phenazopydridine to reduce burning with urination, it will cause your urine to become a bright orange color. This can stain clothing.  Care and prevention  These self-care steps can help prevent future infections:  · Drink plenty of fluids to prevent dehydration and flush out your bladder. Do this unless you must restrict fluids for other health reasons, or your doctor told you not to.  · Proper cleaning after going to the bathroom is important. Wipe from front  to back after using the toilet to prevent the spread of bacteria.  · Urinate more often. Don't try to hold urine in for a long time.  · Wear loose-fitting clothes and cotton underwear. Avoid tight-fitting pants.  · Improve your diet and prevent constipation. Eat more fresh fruit and vegetables, and fiber, and less junk and fatty foods.  · Avoid sex until your symptoms are gone.  · Avoid caffeine, alcohol, and spicy foods. These can irritate your bladder.  · Urinate right after intercourse to flush out your bladder.  · If you use birth control pills and have frequent bladder infections, discuss it with your doctor.  Follow-up care  Call your healthcare provider if all symptoms are not gone after 3 days of treatment. This is especially important if you have repeat infections.  If a culture was done, you will be told if your treatment needs to be changed. If directed, you can call to find out the results.  If X-rays were done, you will be told if the results will affect your treatment.  Call 911  Call 911 if any of the following occur:  · Trouble breathing  · Hard to wake up or confusion  · Fainting or loss of consciousness  · Rapid heart rate  When to seek medical advice  Call your healthcare provider right away if any of these occur:  · Fever of 100.4ºF (38.0ºC) or higher, or as directed by your healthcare provider  · Symptoms are not better by the third day of treatment  · Back or belly (abdominal) pain that gets worse  · Repeated vomiting, or unable to keep medicine down  · Weakness or dizziness  · Vaginal discharge  · Pain, redness, or swelling in the outer vaginal area (labia)  Date Last Reviewed: 10/1/2016  © 5504-1183 [employer] [address] [place] [address]. All rights reserved. This information is not intended as a substitute for professional medical care. Always follow your healthcare professional's instructions.

## 2019-12-05 NOTE — PROGRESS NOTES
"Subjective:       Patient ID: Myesha Quinones is a 79 y.o. female.    Vitals:  height is 5' 3" (1.6 m) and weight is 72.6 kg (160 lb). Her temperature is 98.5 °F (36.9 °C). Her blood pressure is 126/68 and her pulse is 89. Her respiration is 18 and oxygen saturation is 95%.     Chief Complaint: GI Problem    Pt here today accompanied by  for c/o nausea, vomiting, and diarrhea as well as lower back pain, fatigue, and generalized body aches x1 day.     GI Problem   The primary symptoms include fatigue, abdominal pain, nausea, vomiting and diarrhea. Primary symptoms do not include fever, dysuria, myalgias or rash. The illness began yesterday. The onset was sudden. The problem has not changed since onset.  The fatigue began yesterday. The fatigue has been unchanged since its onset.   The abdominal pain began yesterday. The abdominal pain has been unchanged since its onset. The abdominal pain is generalized. The abdominal pain does not radiate. The severity of the abdominal pain is 0/10. The abdominal pain is relieved by nothing.   Nausea began yesterday. The nausea is associated with eating. The nausea is exacerbated by food.   The vomiting began yesterday. Vomiting occurs more than 10 times per day. The emesis contains stomach contents and bilious material.   The diarrhea began yesterday. The diarrhea occurs 5 to 10 times per day.   The illness is also significant for chills. The illness does not include constipation or back pain.       Constitution: Positive for chills, fatigue and generalized weakness. Negative for appetite change, sweating and fever.   HENT: Negative for ear pain, congestion, sinus pain, sinus pressure, sore throat, trouble swallowing and voice change.    Neck: Negative for painful lymph nodes.   Cardiovascular: Negative for chest pain.   Eyes: Negative for eye redness.   Respiratory: Negative for chest tightness, cough, sputum production, bloody sputum, COPD, shortness of breath, " stridor, wheezing and asthma.    Gastrointestinal: Positive for abdominal pain, nausea, vomiting and diarrhea. Negative for abdominal trauma, abdominal bloating, history of abdominal surgery, constipation, dark colored stools and heartburn.   Genitourinary: Negative for dysuria, missed menses and pelvic pain.   Musculoskeletal: Negative for back pain and muscle ache.   Skin: Negative for rash.   Allergic/Immunologic: Negative for seasonal allergies and asthma.   Hematologic/Lymphatic: Negative for swollen lymph nodes.       Objective:      Physical Exam   Constitutional: She is oriented to person, place, and time. She appears well-developed and well-nourished.   Pt calm and cooperative during exam. NAD noted.   HENT:   Head: Normocephalic and atraumatic.   Right Ear: External ear normal.   Left Ear: External ear normal.   Nose: Nose normal.   Mouth/Throat: Oropharynx is clear and moist and mucous membranes are normal.   Eyes: Conjunctivae and lids are normal.   Neck: Trachea normal and full passive range of motion without pain. Neck supple.   Cardiovascular: Normal rate, regular rhythm and normal heart sounds.   Pulmonary/Chest: Effort normal and breath sounds normal. No stridor. No respiratory distress.   Abdominal: Soft. Normal appearance and bowel sounds are normal. She exhibits no distension, no abdominal bruit, no pulsatile midline mass and no mass. There is no tenderness. There is no rigidity, no rebound, no guarding and no CVA tenderness.   Abdomen soft, non-distended, and non-tender to palpation. No CVA tenderness.   Musculoskeletal: Normal range of motion. She exhibits no edema.   Neurological: She is alert and oriented to person, place, and time. She has normal strength.   Skin: Skin is warm, dry, intact, not diaphoretic and not pale. Capillary refill takes less than 2 seconds.   No skin tenting noted.   Psychiatric: She has a normal mood and affect. Her speech is normal and behavior is normal. Judgment  and thought content normal. Cognition and memory are normal.   Nursing note and vitals reviewed.        Results for orders placed or performed in visit on 12/05/19   POCT Influenza A/B   Result Value Ref Range    Rapid Influenza A Ag Negative Negative    Rapid Influenza B Ag Negative Negative     Acceptable Yes    POCT Urinalysis, Dipstick, Automated, W/O Scope   Result Value Ref Range    POC Blood, Urine Negative Negative    POC Bilirubin, Urine Positive (A) Negative    POC Urobilinogen, Urine Normal 0.1 - 1.1    POC Ketones, Urine Negative Negative    POC Protein, Urine Positive (A) Negative    POC Nitrates, Urine Negative Negative    POC Glucose, Urine Negative Negative    pH, UA 5.5 5 - 8    POC Specific Gravity, Urine 1.025 1.003 - 1.029    POC Leukocytes, Urine Positive (A) Negative     *Note: Due to a large number of results and/or encounters for the requested time period, some results have not been displayed. A complete set of results can be found in Results Review.     Xr Kub    Result Date: 12/5/2019  EXAMINATION: XR KUB CLINICAL HISTORY: Nausea with vomiting, unspecified FINDINGS: One view: No obstruction, ileus or perforation seen.  There are cholecystectomy clips.     No acute process seen. Electronically signed by: Shadi Dang MD Date:    12/05/2019 Time:    11:42    Assessment:       1. Urinary tract infection without hematuria, site unspecified    2. Non-intractable vomiting with nausea, unspecified vomiting type    3. Cough    4. Diarrhea, unspecified type    5. Acute bilateral low back pain without sciatica        Plan:         Urinary tract infection without hematuria, site unspecified  -     Cancel: Urine culture  -     nitrofurantoin, macrocrystal-monohydrate, (MACROBID) 100 MG capsule; Take 1 capsule (100 mg total) by mouth 2 (two) times daily. for 5 days  Dispense: 10 capsule; Refill: 0    Non-intractable vomiting with nausea, unspecified vomiting type  -     XR KUB; Future;  Expected date: 12/05/2019  -     ondansetron (ZOFRAN-ODT) 4 MG TbDL; Take 1 tablet (4 mg total) by mouth every 6 (six) hours as needed.  Dispense: 30 tablet; Refill: 0    Cough  -     POCT Influenza A/B    Diarrhea, unspecified type  -     XR KUB; Future; Expected date: 12/05/2019    Acute bilateral low back pain without sciatica  -     POCT Urinalysis, Dipstick, Automated, W/O Scope  -     Cancel: Urine culture    Pt given strict ER precautions should symptoms fail to improve or worsen-- pt and  verbalize understanding.     Patient Instructions       PLEASE READ YOUR DISCHARGE INSTRUCTIONS ENTIRELY AS IT CONTAINS IMPORTANT INFORMATION.    Take the zofran for nausea (it dissolves under your tongue).    Use gatorade/pedialyte or rehydration packets to help stay hydrated. Vitamin water and plain water do not contain rehydrating electrolytes.  Increase clear liquids (water, gatorade, pedialyte, broths, jello, etc) Hold off on solids for 12-18 hours. Then advance to BRAT diet (banana, rice, applesauce, tea, toast/crackers), then advance further as tolerated. Avoid spicy or fatty foods.   Use Peptobismol to help alleviate your diarrhea symptoms.     Avoid imodium unless you have more than 6 loose stools in 24 hours.       Wash hands frequently while sick. Avoid ibuprofen or other NSAIDS until you are well.     Please go to the ER if you experience worsening pain, blood in your vomit or stool, high fever, dizziness, fainting, swelling of your abdomen, inability to pass gas or stool.     Take the antibiotics to completion.     Drink plenty of fluids, wipe front to back, take showers not baths, no scented soaps, wear breathable cotton underwear, urinate after sexual intercourse.     A urine culture was sent. You will be contacted once it results and appropriate action will be taken if needed.     Please go to the ER for worsening symptoms including fever, worsening flank pain, vomiting, etc.       Please return or  see your primary care doctor if you develop new or worsening symptoms.     Please arrange follow up with your primary medical clinic as soon as possible. You must understand that you've received an Urgent Care treatment only and that you may be released before all of your medical problems are known or treated. You, the patient, will arrange for follow up as instructed. If your symptoms worsen or fail to improve you should go to the Emergency Room.  WE CANNOT RULE OUT ALL POSSIBLE CAUSES OF YOUR SYMPTOMS IN THE URGENT CARE SETTING PLEASE GO TO THE ER IF YOU FEELS YOUR CONDITION IS WORSENING OR YOU WOULD LIKE EMERGENT EVALUATION.        Please arrange follow up with your primary medical clinic as soon as possible. You must understand that you've received an Urgent Care treatment only and that you may be released before all of your medical problems are known or treated. You, the patient, will arrange for follow up as instructed. If your symptoms worsen or fail to improve you should go to the Emergency Room.  WE CANNOT RULE OUT ALL POSSIBLE CAUSES OF YOUR SYMPTOMS IN THE URGENT CARE SETTING PLEASE GO TO THE ER IF YOU FEELS YOUR CONDITION IS WORSENING OR YOU WOULD LIKE EMERGENT EVALUATION.        Gastroenteritis (Adult)    Gastroenteritis is commonly called the stomach flu. It is most often caused by a virus that affects the stomach and intestinal tract and usually lasts from 2 to 7 days. Common viruses causing gastroenteritis include norovirus, rotavirus, and hepatitis A. Non-viral causes of gastroenteritis include bacteria, parasites, and toxins.  The danger from repeated vomiting or diarrhea is dehydration. This is the loss of too much fluid from the body. When this occurs, body fluids must be replaced. Antibiotics do not help with this illness because it is usually viral.Simple home treatment will be helpful.  Symptoms of viral gastroenteritis may include:  · Watery, loose stools  · Stomach pain or abdominal  cramps  · Fever and chills  · Nausea and vomiting  · Loss of bowel control  · Headache  Home care  Gastroenteritis is transmitted by contact with the stool or vomit of an infected person. This can occur from person to person or from contact with a contaminated surface.  Follow these guidelines when caring for yourself at home:  · If symptoms are severe, rest at home for the next 24 hours or until you are feeling better.  · Wash your hands with soap and water or use alcohol-based  to prevent the spread of infection. Wash your hands after touching anyone who is sick.  · Wash your hands or use alcohol-based  after using the toilet and before meals. Clean the toilet after each use.  Remember these tips when preparing food:  · People with diarrhea should not prepare or serve food to others. When preparing foods, wash your hands before and after.  · Wash your hands after using cutting boards, countertops, knives, or utensils that have been in contact with raw food.  · Keep uncooked meats away from cooked and ready-to-eat foods.  Medicine  You may use acetaminophen or NSAID medicines like ibuprofen or naproxen to control fever unless another medicine was given. If you have chronic liver or kidney disease, talk with your healthcare provider before using these medicines. Also talk with your provider if you've had a stomach ulcer or gastrointestinal bleeding. Don't give aspirin to anyone under 18 years of age who is ill with a fever. It may cause severe liver damage. Don't use NSAIDS is you are already taking one for another condition (like arthritis) or are on aspirin (such as for heart disease or after a stroke).  If medicine for vomiting or diarrhea are prescribed, take these only as directed. Do not take over-the-counter medicines for vomiting or diarrhea unless instructed by your healthcare provider.  Diet  Follow these guidelines for food:  · Water and liquids are important so you don't get dehydrated.  Drink a small amount at a time or suck on ice chips if you are vomiting.  · If you eat, avoid fatty, greasy, spicy, or fried foods.  · Don't eat dairy if you have diarrhea. This can make diarrhea worse.  · Avoid tobacco, alcohol, and caffeine which may worsen symptoms.  During the first 24 hours (the first full day), follow the diet below:  · Beverages. Sports drinks, soft drinks without caffeine, ginger ale, mineral water (plain or flavored), decaffeinated tea and coffee. If you are very dehydrated, sports drinks aren't a good choice. They have too much sugar and not enough electrolytes. In this case, commercially available products called oral rehydration solutions, are best.  · Soups. Eat clear broth, consommé, and bouillon.  · Desserts. Eat gelatin, popsicles, and fruit juice bars.  During the next 24 hours (the second day), you may add the following to the above:  · Hot cereal, plain toast, bread, rolls, and crackers  · Plain noodles, rice, mashed potatoes, chicken noodle or rice soup  · Unsweetened canned fruit (avoid pineapple), bananas  · Limit fat intake to less than 15 grams per day. Do this by avoiding margarine, butter, oils, mayonnaise, sauces, gravies, fried foods, peanut butter, meat, poultry, and fish.  · Limit fiber and avoid raw or cooked vegetables, fresh fruits (except bananas), and bran cereals.  · Limit caffeine and chocolate. Don't use spices or seasonings other than salt.  · Limit dairy products.  · Avoid alcohol.  During the next 24 hours:  · Gradually resume a normal diet as you feel better and your symptoms improve.  · If at any time it starts getting worse again, go back to clear liquids until you feel better.  Follow-up care  Follow up with your healthcare provider, or as advised. Call your provider if you don't get better within 24 hours or if diarrhea lasts more than a week. Also follow up if you are unable to keep down liquids and get dehydrated. If a stool (diarrhea) sample was  "taken, call as directed for the results.  Call 911  Call 911 if any of these occur:  · Trouble breathing  · Chest pain  · Confused  · Severe drowsiness or trouble awakening  · Fainting or loss of consciousness  · Rapid heart rate  · Seizure  · Stiff neck  When to seek medical advice  Call your healthcare provider right away if any of these occur:  · Abdominal pain that gets worse  · Continued vomiting (unable to keep liquids down)  · Frequent diarrhea (more than 5 times a day)  · Blood in vomit or stool (black or red color)  · Dark urine, reduced urine output, or extreme thirst  · Weakness or dizziness  · Drowsiness  · Fever of 100.4°F (38°C) oral or higher that does not get better with fever medicine  · New rash  Date Last Reviewed: 1/3/2016  © 4995-5662 [employer]. [address] [place] [address]. All rights reserved. This information is not intended as a substitute for professional medical care. Always follow your healthcare professional's instructions.        Bladder Infection, Female (Adult)    Urine is normally doesn't have any bacteria in it. But bacteria can get into the urinary tract from the skin around the rectum. Or they can travel in the blood from elsewhere in the body. Once they are in your urinary tract, they can cause infection in the urethra (urethritis), the bladder (cystitis), or the kidneys (pyelonephritis).  The most common place for an infection is in the bladder. This is called a bladder infection. This is one of the most common infections in women. Most bladder infections are easily treated. They are not serious unless the infection spreads to the kidney.  The phrases "bladder infection," "UTI," and "cystitis" are often used to describe the same thing. But they are not always the same. Cystitis is an inflammation of the bladder. The most common cause of cystitis is an infection.  Symptoms  The infection causes inflammation in the urethra and bladder. This causes many " of the symptoms. The most common symptoms of a bladder infection are:  · Pain or burning when urinating  · Having to urinate more often than usual  · Urgent need to urinate  · Only a small amount of urine comes out  · Blood in urine  · Abdominal discomfort. This is usually in the lower abdomen above the pubic bone.  · Cloudy urine  · Strong- or bad-smelling urine  · Unable to urinate (urinary retention)  · Unable to hold urine in (urinary incontinence)  · Fever  · Loss of appetite  · Confusion (in older adults)  Causes  Bladder infections are not contagious. You can't get one from someone else, from a toilet seat, or from sharing a bath.  The most common cause of bladder infections is bacteria from the bowels. The bacteria get onto the skin around the opening of the urethra. From there, they can get into the urine and travel up to the bladder, causing inflammation and infection. This usually happens because of:  · Wiping improperly after urinating. Always wipe from front to back.  · Bowel incontinence  · Pregnancy  · Procedures such as having a catheter inserted  · Older age  · Not emptying your bladder. This can allow bacteria a chance to grow in your urine.  · Dehydration  · Constipation  · Sex  · Use of a diaphragm for birth control   Treatment  Bladder infections are diagnosed by a urine test. They are treated with antibiotics and usually clear up quickly without complications. Treatment helps prevent a more serious kidney infection.  Medicines  Medicines can help in the treatment of a bladder infection:  · Take antibiotics until they are used up, even if you feel better. It is important to finish them to make sure the infection has cleared.  · You can use acetaminophen or ibuprofen for pain, fever, or discomfort, unless another medicine was prescribed. If you have chronic liver or kidney disease, talk with your healthcare provider before using these medicines. Also talk with your provider if you've ever had a  stomach ulcer or gastrointestinal bleeding, or are taking blood-thinner medicines.  · If you are given phenazopydridine to reduce burning with urination, it will cause your urine to become a bright orange color. This can stain clothing.  Care and prevention  These self-care steps can help prevent future infections:  · Drink plenty of fluids to prevent dehydration and flush out your bladder. Do this unless you must restrict fluids for other health reasons, or your doctor told you not to.  · Proper cleaning after going to the bathroom is important. Wipe from front to back after using the toilet to prevent the spread of bacteria.  · Urinate more often. Don't try to hold urine in for a long time.  · Wear loose-fitting clothes and cotton underwear. Avoid tight-fitting pants.  · Improve your diet and prevent constipation. Eat more fresh fruit and vegetables, and fiber, and less junk and fatty foods.  · Avoid sex until your symptoms are gone.  · Avoid caffeine, alcohol, and spicy foods. These can irritate your bladder.  · Urinate right after intercourse to flush out your bladder.  · If you use birth control pills and have frequent bladder infections, discuss it with your doctor.  Follow-up care  Call your healthcare provider if all symptoms are not gone after 3 days of treatment. This is especially important if you have repeat infections.  If a culture was done, you will be told if your treatment needs to be changed. If directed, you can call to find out the results.  If X-rays were done, you will be told if the results will affect your treatment.  Call 911  Call 911 if any of the following occur:  · Trouble breathing  · Hard to wake up or confusion  · Fainting or loss of consciousness  · Rapid heart rate  When to seek medical advice  Call your healthcare provider right away if any of these occur:  · Fever of 100.4ºF (38.0ºC) or higher, or as directed by your healthcare provider  · Symptoms are not better by the third day  of treatment  · Back or belly (abdominal) pain that gets worse  · Repeated vomiting, or unable to keep medicine down  · Weakness or dizziness  · Vaginal discharge  · Pain, redness, or swelling in the outer vaginal area (labia)  Date Last Reviewed: 10/1/2016  © 5783-6477 [employer]. [address] [place] [id number]. All rights reserved. This information is not intended as a substitute for professional medical care. Always follow your healthcare professional's instructions.

## 2019-12-07 ENCOUNTER — HOSPITAL ENCOUNTER (INPATIENT)
Facility: HOSPITAL | Age: 79
LOS: 3 days | Discharge: HOME HEALTH | DRG: 871 | End: 2019-12-10
Attending: EMERGENCY MEDICINE | Admitting: HOSPITALIST
Payer: MEDICARE

## 2019-12-07 DIAGNOSIS — N12 PYELONEPHRITIS: ICD-10-CM

## 2019-12-07 DIAGNOSIS — A41.9 SEPSIS: Primary | ICD-10-CM

## 2019-12-07 DIAGNOSIS — R50.9 FEVER: ICD-10-CM

## 2019-12-07 DIAGNOSIS — I10 ESSENTIAL HYPERTENSION: ICD-10-CM

## 2019-12-07 DIAGNOSIS — R07.9 CHEST PAIN: ICD-10-CM

## 2019-12-07 DIAGNOSIS — J18.9 PNEUMONIA OF RIGHT LOWER LOBE DUE TO INFECTIOUS ORGANISM: ICD-10-CM

## 2019-12-07 DIAGNOSIS — N17.9 ACUTE KIDNEY INJURY: ICD-10-CM

## 2019-12-07 LAB
ADENOVIRUS: NOT DETECTED
ALBUMIN SERPL BCP-MCNC: 2.5 G/DL (ref 3.5–5.2)
ALP SERPL-CCNC: 99 U/L (ref 55–135)
ALT SERPL W/O P-5'-P-CCNC: 15 U/L (ref 10–44)
ANION GAP SERPL CALC-SCNC: 13 MMOL/L (ref 8–16)
ANISOCYTOSIS BLD QL SMEAR: SLIGHT
AST SERPL-CCNC: 21 U/L (ref 10–40)
BACTERIA #/AREA URNS AUTO: NORMAL /HPF
BASOPHILS # BLD AUTO: 0.04 K/UL (ref 0–0.2)
BASOPHILS NFR BLD: 0.3 % (ref 0–1.9)
BILIRUB SERPL-MCNC: 0.6 MG/DL (ref 0.1–1)
BILIRUB UR QL STRIP: NEGATIVE
BORDETELLA PARAPERTUSSIS (IS1001): NOT DETECTED
BORDETELLA PERTUSSIS (PTXP): NOT DETECTED
BUN SERPL-MCNC: 50 MG/DL (ref 8–23)
CALCIUM SERPL-MCNC: 9.3 MG/DL (ref 8.7–10.5)
CHLAMYDIA PNEUMONIAE: NOT DETECTED
CHLORIDE SERPL-SCNC: 95 MMOL/L (ref 95–110)
CLARITY UR REFRACT.AUTO: ABNORMAL
CO2 SERPL-SCNC: 24 MMOL/L (ref 23–29)
COLOR UR AUTO: YELLOW
CORONAVIRUS 229E, COMMON COLD VIRUS: NOT DETECTED
CORONAVIRUS HKU1, COMMON COLD VIRUS: NOT DETECTED
CORONAVIRUS NL63, COMMON COLD VIRUS: NOT DETECTED
CORONAVIRUS OC43, COMMON COLD VIRUS: NOT DETECTED
CREAT SERPL-MCNC: 2.2 MG/DL (ref 0.5–1.4)
CTP QC/QA: YES
DIFFERENTIAL METHOD: ABNORMAL
DOHLE BOD BLD QL SMEAR: PRESENT
EOSINOPHIL # BLD AUTO: 0 K/UL (ref 0–0.5)
EOSINOPHIL NFR BLD: 0 % (ref 0–8)
ERYTHROCYTE [DISTWIDTH] IN BLOOD BY AUTOMATED COUNT: 12.6 % (ref 11.5–14.5)
EST. GFR  (AFRICAN AMERICAN): 23.7 ML/MIN/1.73 M^2
EST. GFR  (NON AFRICAN AMERICAN): 20.6 ML/MIN/1.73 M^2
FLUBV RNA NPH QL NAA+NON-PROBE: NOT DETECTED
GIANT PLATELETS BLD QL SMEAR: PRESENT
GLUCOSE SERPL-MCNC: 61 MG/DL (ref 70–110)
GLUCOSE UR QL STRIP: NEGATIVE
HCT VFR BLD AUTO: 31.1 % (ref 37–48.5)
HGB BLD-MCNC: 9.8 G/DL (ref 12–16)
HGB UR QL STRIP: NEGATIVE
HPIV1 RNA NPH QL NAA+NON-PROBE: NOT DETECTED
HPIV2 RNA NPH QL NAA+NON-PROBE: NOT DETECTED
HPIV3 RNA NPH QL NAA+NON-PROBE: NOT DETECTED
HPIV4 RNA NPH QL NAA+NON-PROBE: NOT DETECTED
HUMAN METAPNEUMOVIRUS: NOT DETECTED
HYALINE CASTS UR QL AUTO: 0 /LPF
IMM GRANULOCYTES # BLD AUTO: 0.1 K/UL (ref 0–0.04)
IMM GRANULOCYTES NFR BLD AUTO: 0.7 % (ref 0–0.5)
INFLUENZA A (SUBTYPES H1,H1-2009,H3): NOT DETECTED
KETONES UR QL STRIP: NEGATIVE
LACTATE SERPL-SCNC: 0.6 MMOL/L (ref 0.5–2.2)
LACTATE SERPL-SCNC: 0.9 MMOL/L (ref 0.5–2.2)
LEUKOCYTE ESTERASE UR QL STRIP: ABNORMAL
LYMPHOCYTES # BLD AUTO: 0.7 K/UL (ref 1–4.8)
LYMPHOCYTES NFR BLD: 4.9 % (ref 18–48)
MCH RBC QN AUTO: 29.5 PG (ref 27–31)
MCHC RBC AUTO-ENTMCNC: 31.5 G/DL (ref 32–36)
MCV RBC AUTO: 94 FL (ref 82–98)
MICROSCOPIC COMMENT: NORMAL
MONOCYTES # BLD AUTO: 0.5 K/UL (ref 0.3–1)
MONOCYTES NFR BLD: 3.3 % (ref 4–15)
MYCOPLASMA PNEUMONIAE: NOT DETECTED
NEUTROPHILS # BLD AUTO: 13.5 K/UL (ref 1.8–7.7)
NEUTROPHILS NFR BLD: 90.8 % (ref 38–73)
NITRITE UR QL STRIP: NEGATIVE
NRBC BLD-RTO: 0 /100 WBC
PH UR STRIP: 5 [PH] (ref 5–8)
PLATELET # BLD AUTO: 202 K/UL (ref 150–350)
PLATELET BLD QL SMEAR: ABNORMAL
PMV BLD AUTO: 12.1 FL (ref 9.2–12.9)
POC MOLECULAR INFLUENZA A AGN: NEGATIVE
POC MOLECULAR INFLUENZA B AGN: NEGATIVE
POCT GLUCOSE: 98 MG/DL (ref 70–110)
POTASSIUM SERPL-SCNC: 3.2 MMOL/L (ref 3.5–5.1)
PROT SERPL-MCNC: 6.8 G/DL (ref 6–8.4)
PROT UR QL STRIP: ABNORMAL
RBC # BLD AUTO: 3.32 M/UL (ref 4–5.4)
RBC #/AREA URNS AUTO: 2 /HPF (ref 0–4)
RESPIRATORY INFECTION PANEL SOURCE: NORMAL
RSV RNA NPH QL NAA+NON-PROBE: NOT DETECTED
RV+EV RNA NPH QL NAA+NON-PROBE: NOT DETECTED
SODIUM SERPL-SCNC: 132 MMOL/L (ref 136–145)
SP GR UR STRIP: 1.01 (ref 1–1.03)
SQUAMOUS #/AREA URNS AUTO: 3 /HPF
TOXIC GRANULES BLD QL SMEAR: PRESENT
URN SPEC COLLECT METH UR: ABNORMAL
WBC # BLD AUTO: 14.81 K/UL (ref 3.9–12.7)
WBC #/AREA URNS AUTO: 1 /HPF (ref 0–5)

## 2019-12-07 PROCEDURE — 99223 PR INITIAL HOSPITAL CARE,LEVL III: ICD-10-PCS | Mod: AI,HCNC,GC, | Performed by: HOSPITALIST

## 2019-12-07 PROCEDURE — 63600175 PHARM REV CODE 636 W HCPCS: Mod: HCNC | Performed by: EMERGENCY MEDICINE

## 2019-12-07 PROCEDURE — 87186 SC STD MICRODIL/AGAR DIL: CPT | Mod: 59,HCNC

## 2019-12-07 PROCEDURE — 85025 COMPLETE CBC W/AUTO DIFF WBC: CPT | Mod: HCNC

## 2019-12-07 PROCEDURE — 87040 BLOOD CULTURE FOR BACTERIA: CPT | Mod: 59,HCNC

## 2019-12-07 PROCEDURE — 80053 COMPREHEN METABOLIC PANEL: CPT | Mod: HCNC

## 2019-12-07 PROCEDURE — 25000003 PHARM REV CODE 250: Mod: HCNC | Performed by: STUDENT IN AN ORGANIZED HEALTH CARE EDUCATION/TRAINING PROGRAM

## 2019-12-07 PROCEDURE — 25000003 PHARM REV CODE 250: Mod: HCNC | Performed by: PHYSICIAN ASSISTANT

## 2019-12-07 PROCEDURE — 25000242 PHARM REV CODE 250 ALT 637 W/ HCPCS: Mod: HCNC | Performed by: STUDENT IN AN ORGANIZED HEALTH CARE EDUCATION/TRAINING PROGRAM

## 2019-12-07 PROCEDURE — 94640 AIRWAY INHALATION TREATMENT: CPT | Mod: HCNC

## 2019-12-07 PROCEDURE — 63600175 PHARM REV CODE 636 W HCPCS: Mod: HCNC | Performed by: PHYSICIAN ASSISTANT

## 2019-12-07 PROCEDURE — 87077 CULTURE AEROBIC IDENTIFY: CPT | Mod: HCNC

## 2019-12-07 PROCEDURE — 83605 ASSAY OF LACTIC ACID: CPT | Mod: HCNC

## 2019-12-07 PROCEDURE — 11000001 HC ACUTE MED/SURG PRIVATE ROOM: Mod: HCNC

## 2019-12-07 PROCEDURE — 99291 CRITICAL CARE FIRST HOUR: CPT | Mod: HCNC,,, | Performed by: PHYSICIAN ASSISTANT

## 2019-12-07 PROCEDURE — 96361 HYDRATE IV INFUSION ADD-ON: CPT | Mod: HCNC

## 2019-12-07 PROCEDURE — 99291 CRITICAL CARE FIRST HOUR: CPT | Mod: 25,HCNC

## 2019-12-07 PROCEDURE — 87070 CULTURE OTHR SPECIMN AEROBIC: CPT | Mod: HCNC

## 2019-12-07 PROCEDURE — 63600175 PHARM REV CODE 636 W HCPCS: Mod: HCNC | Performed by: STUDENT IN AN ORGANIZED HEALTH CARE EDUCATION/TRAINING PROGRAM

## 2019-12-07 PROCEDURE — 27000221 HC OXYGEN, UP TO 24 HOURS: Mod: HCNC

## 2019-12-07 PROCEDURE — 87205 SMEAR GRAM STAIN: CPT | Mod: HCNC

## 2019-12-07 PROCEDURE — 93010 ELECTROCARDIOGRAM REPORT: CPT | Mod: HCNC,,, | Performed by: INTERNAL MEDICINE

## 2019-12-07 PROCEDURE — 94761 N-INVAS EAR/PLS OXIMETRY MLT: CPT | Mod: HCNC

## 2019-12-07 PROCEDURE — 81001 URINALYSIS AUTO W/SCOPE: CPT | Mod: HCNC

## 2019-12-07 PROCEDURE — 99223 1ST HOSP IP/OBS HIGH 75: CPT | Mod: AI,HCNC,GC, | Performed by: HOSPITALIST

## 2019-12-07 PROCEDURE — 93005 ELECTROCARDIOGRAM TRACING: CPT | Mod: HCNC

## 2019-12-07 PROCEDURE — 93010 EKG 12-LEAD: ICD-10-PCS | Mod: HCNC,,, | Performed by: INTERNAL MEDICINE

## 2019-12-07 PROCEDURE — 96365 THER/PROPH/DIAG IV INF INIT: CPT | Mod: HCNC

## 2019-12-07 PROCEDURE — 87486 CHLMYD PNEUM DNA AMP PROBE: CPT | Mod: HCNC

## 2019-12-07 PROCEDURE — 99291 PR CRITICAL CARE, E/M 30-74 MINUTES: ICD-10-PCS | Mod: HCNC,,, | Performed by: PHYSICIAN ASSISTANT

## 2019-12-07 RX ORDER — ONDANSETRON 8 MG/1
8 TABLET, ORALLY DISINTEGRATING ORAL EVERY 8 HOURS PRN
Status: DISCONTINUED | OUTPATIENT
Start: 2019-12-07 | End: 2019-12-10 | Stop reason: HOSPADM

## 2019-12-07 RX ORDER — HYDROCODONE BITARTRATE AND ACETAMINOPHEN 5; 325 MG/1; MG/1
1 TABLET ORAL
Status: COMPLETED | OUTPATIENT
Start: 2019-12-07 | End: 2019-12-07

## 2019-12-07 RX ORDER — AZITHROMYCIN 250 MG/1
500 TABLET, FILM COATED ORAL DAILY
Status: DISCONTINUED | OUTPATIENT
Start: 2019-12-08 | End: 2019-12-09

## 2019-12-07 RX ORDER — ENOXAPARIN SODIUM 100 MG/ML
30 INJECTION SUBCUTANEOUS EVERY 24 HOURS
Status: DISCONTINUED | OUTPATIENT
Start: 2019-12-07 | End: 2019-12-10 | Stop reason: HOSPADM

## 2019-12-07 RX ORDER — PRAVASTATIN SODIUM 40 MG/1
40 TABLET ORAL DAILY
Status: DISCONTINUED | OUTPATIENT
Start: 2019-12-07 | End: 2019-12-10 | Stop reason: HOSPADM

## 2019-12-07 RX ORDER — IPRATROPIUM BROMIDE AND ALBUTEROL SULFATE 2.5; .5 MG/3ML; MG/3ML
3 SOLUTION RESPIRATORY (INHALATION) EVERY 4 HOURS
Status: DISCONTINUED | OUTPATIENT
Start: 2019-12-07 | End: 2019-12-10 | Stop reason: HOSPADM

## 2019-12-07 RX ORDER — ENOXAPARIN SODIUM 100 MG/ML
40 INJECTION SUBCUTANEOUS EVERY 24 HOURS
Status: DISCONTINUED | OUTPATIENT
Start: 2019-12-07 | End: 2019-12-07

## 2019-12-07 RX ORDER — SODIUM CHLORIDE 0.9 % (FLUSH) 0.9 %
10 SYRINGE (ML) INJECTION
Status: DISCONTINUED | OUTPATIENT
Start: 2019-12-07 | End: 2019-12-10 | Stop reason: HOSPADM

## 2019-12-07 RX ORDER — GLUCAGON 1 MG
1 KIT INJECTION
Status: DISCONTINUED | OUTPATIENT
Start: 2019-12-07 | End: 2019-12-10 | Stop reason: HOSPADM

## 2019-12-07 RX ORDER — ALBUTEROL SULFATE 90 UG/1
2 AEROSOL, METERED RESPIRATORY (INHALATION) EVERY 4 HOURS PRN
Status: DISCONTINUED | OUTPATIENT
Start: 2019-12-07 | End: 2019-12-10 | Stop reason: HOSPADM

## 2019-12-07 RX ORDER — FLUTICASONE FUROATE AND VILANTEROL 100; 25 UG/1; UG/1
1 POWDER RESPIRATORY (INHALATION) DAILY
Status: DISCONTINUED | OUTPATIENT
Start: 2019-12-07 | End: 2019-12-10 | Stop reason: HOSPADM

## 2019-12-07 RX ORDER — CLONIDINE 0.1 MG/24H
1 PATCH, EXTENDED RELEASE TRANSDERMAL
Status: DISCONTINUED | OUTPATIENT
Start: 2019-12-07 | End: 2019-12-10 | Stop reason: HOSPADM

## 2019-12-07 RX ORDER — BENZONATATE 100 MG/1
200 CAPSULE ORAL 3 TIMES DAILY PRN
Status: DISCONTINUED | OUTPATIENT
Start: 2019-12-07 | End: 2019-12-10 | Stop reason: HOSPADM

## 2019-12-07 RX ORDER — ACETAMINOPHEN 325 MG/1
650 TABLET ORAL EVERY 8 HOURS PRN
Status: DISCONTINUED | OUTPATIENT
Start: 2019-12-07 | End: 2019-12-08

## 2019-12-07 RX ORDER — DEXTROSE MONOHYDRATE 100 MG/ML
12.5 INJECTION, SOLUTION INTRAVENOUS
Status: DISCONTINUED | OUTPATIENT
Start: 2019-12-07 | End: 2019-12-10 | Stop reason: HOSPADM

## 2019-12-07 RX ORDER — LANOLIN ALCOHOL/MO/W.PET/CERES
400 CREAM (GRAM) TOPICAL DAILY
Status: DISCONTINUED | OUTPATIENT
Start: 2019-12-07 | End: 2019-12-10 | Stop reason: HOSPADM

## 2019-12-07 RX ORDER — IBUPROFEN 200 MG
16 TABLET ORAL
Status: DISCONTINUED | OUTPATIENT
Start: 2019-12-07 | End: 2019-12-10 | Stop reason: HOSPADM

## 2019-12-07 RX ORDER — HYDRALAZINE HYDROCHLORIDE 50 MG/1
100 TABLET, FILM COATED ORAL 3 TIMES DAILY
Status: DISCONTINUED | OUTPATIENT
Start: 2019-12-07 | End: 2019-12-10 | Stop reason: HOSPADM

## 2019-12-07 RX ORDER — IPRATROPIUM BROMIDE AND ALBUTEROL SULFATE 2.5; .5 MG/3ML; MG/3ML
3 SOLUTION RESPIRATORY (INHALATION) EVERY 6 HOURS
Status: DISCONTINUED | OUTPATIENT
Start: 2019-12-07 | End: 2019-12-07

## 2019-12-07 RX ORDER — IBUPROFEN 200 MG
24 TABLET ORAL
Status: DISCONTINUED | OUTPATIENT
Start: 2019-12-07 | End: 2019-12-10 | Stop reason: HOSPADM

## 2019-12-07 RX ORDER — DEXTROSE MONOHYDRATE 100 MG/ML
25 INJECTION, SOLUTION INTRAVENOUS
Status: DISCONTINUED | OUTPATIENT
Start: 2019-12-07 | End: 2019-12-10 | Stop reason: HOSPADM

## 2019-12-07 RX ORDER — VANCOMYCIN HCL IN 5 % DEXTROSE 1G/250ML
1000 PLASTIC BAG, INJECTION (ML) INTRAVENOUS
Status: COMPLETED | OUTPATIENT
Start: 2019-12-07 | End: 2019-12-07

## 2019-12-07 RX ORDER — INSULIN ASPART 100 [IU]/ML
0-5 INJECTION, SOLUTION INTRAVENOUS; SUBCUTANEOUS
Status: DISCONTINUED | OUTPATIENT
Start: 2019-12-07 | End: 2019-12-10 | Stop reason: HOSPADM

## 2019-12-07 RX ORDER — ACETAMINOPHEN 325 MG/1
650 TABLET ORAL
Status: COMPLETED | OUTPATIENT
Start: 2019-12-07 | End: 2019-12-07

## 2019-12-07 RX ORDER — POTASSIUM CHLORIDE 7.45 MG/ML
10 INJECTION INTRAVENOUS
Status: DISPENSED | OUTPATIENT
Start: 2019-12-07 | End: 2019-12-07

## 2019-12-07 RX ORDER — FLUTICASONE PROPIONATE 50 MCG
2 SPRAY, SUSPENSION (ML) NASAL DAILY
Status: DISCONTINUED | OUTPATIENT
Start: 2019-12-07 | End: 2019-12-10 | Stop reason: HOSPADM

## 2019-12-07 RX ORDER — VANCOMYCIN 2 GRAM/500 ML IN 0.9 % SODIUM CHLORIDE INTRAVENOUS
2000
Status: DISCONTINUED | OUTPATIENT
Start: 2019-12-07 | End: 2019-12-07

## 2019-12-07 RX ORDER — CEFTRIAXONE 1 G/1
1 INJECTION, POWDER, FOR SOLUTION INTRAMUSCULAR; INTRAVENOUS
Status: DISCONTINUED | OUTPATIENT
Start: 2019-12-08 | End: 2019-12-09

## 2019-12-07 RX ADMIN — POTASSIUM CHLORIDE 10 MEQ: 10 INJECTION, SOLUTION INTRAVENOUS at 07:12

## 2019-12-07 RX ADMIN — AZITHROMYCIN MONOHYDRATE 500 MG: 500 INJECTION, POWDER, LYOPHILIZED, FOR SOLUTION INTRAVENOUS at 12:12

## 2019-12-07 RX ADMIN — IPRATROPIUM BROMIDE AND ALBUTEROL SULFATE 3 ML: .5; 3 SOLUTION RESPIRATORY (INHALATION) at 07:12

## 2019-12-07 RX ADMIN — ACETAMINOPHEN 650 MG: 325 TABLET ORAL at 10:12

## 2019-12-07 RX ADMIN — POTASSIUM CHLORIDE 10 MEQ: 10 INJECTION, SOLUTION INTRAVENOUS at 10:12

## 2019-12-07 RX ADMIN — HYDRALAZINE HYDROCHLORIDE 100 MG: 50 TABLET, FILM COATED ORAL at 02:12

## 2019-12-07 RX ADMIN — HYDROCODONE BITARTRATE AND ACETAMINOPHEN 1 TABLET: 5; 325 TABLET ORAL at 04:12

## 2019-12-07 RX ADMIN — PIPERACILLIN AND TAZOBACTAM 4.5 G: 4; .5 INJECTION, POWDER, LYOPHILIZED, FOR SOLUTION INTRAVENOUS; PARENTERAL at 01:12

## 2019-12-07 RX ADMIN — Medication 400 MG: at 03:12

## 2019-12-07 RX ADMIN — HYDRALAZINE HYDROCHLORIDE 100 MG: 50 TABLET, FILM COATED ORAL at 09:12

## 2019-12-07 RX ADMIN — IPRATROPIUM BROMIDE AND ALBUTEROL SULFATE 3 ML: .5; 3 SOLUTION RESPIRATORY (INHALATION) at 11:12

## 2019-12-07 RX ADMIN — ENOXAPARIN SODIUM 30 MG: 100 INJECTION SUBCUTANEOUS at 05:12

## 2019-12-07 RX ADMIN — CLONIDINE 1 PATCH: 0.1 PATCH TRANSDERMAL at 09:12

## 2019-12-07 RX ADMIN — POTASSIUM CHLORIDE 10 MEQ: 10 INJECTION, SOLUTION INTRAVENOUS at 06:12

## 2019-12-07 RX ADMIN — PRAVASTATIN SODIUM 40 MG: 40 TABLET ORAL at 03:12

## 2019-12-07 RX ADMIN — IPRATROPIUM BROMIDE AND ALBUTEROL SULFATE 3 ML: .5; 3 SOLUTION RESPIRATORY (INHALATION) at 03:12

## 2019-12-07 RX ADMIN — VANCOMYCIN HYDROCHLORIDE 1000 MG: 1 INJECTION, POWDER, LYOPHILIZED, FOR SOLUTION INTRAVENOUS at 02:12

## 2019-12-07 RX ADMIN — SODIUM CHLORIDE 1000 ML: 0.9 INJECTION, SOLUTION INTRAVENOUS at 10:12

## 2019-12-07 RX ADMIN — ACETAMINOPHEN 650 MG: 325 TABLET ORAL at 05:12

## 2019-12-07 NOTE — ED NOTES
Pt given orange specimen cup and instructed to cough up sputum and collect in specimen cup over next hour for respiratory culture.

## 2019-12-07 NOTE — ED PROVIDER NOTES
Encounter Date: 12/7/2019       History     Chief Complaint   Patient presents with    Urinary Tract Infection     on antibiotics feeling weak     This is an 79-year-old female with a PMH of HTN, DM, CAD, CHF, COPD, remote breast ca who presents to the ED with a chief complaint of generalized weakness. Patient reports a 3 day history of feeling poorly.  Initially her symptoms began with nausea, vomiting, and diarrhea.  She was seen at urgent care and started on Macrobid for UTI.  Patient states that she has a chronic cough which is productive of dark sputum.  This is unchanged from baseline.  Patient endorses fever and malaise.  She states that last night she was so weak when ambulating to the bedroom that she had a fall.  She does note head injury but denies loss of consciousness.  Patient required assistance getting up off the floor and has been weak since that time, prompting her to come for further evaluation.  She denies headache, neck pain/stiffness, URI symptoms, chest pain, abdominal pain, dysuria, urinary frequency, decreased urination, melena or bloody stool.  Surgical history of cholecystectomy and hysterectomy.        Review of patient's allergies indicates:   Allergen Reactions    Iodine and iodide containing products Hives    Nifedipine      weakness     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, with long-term current use of insulin  2/22/2018    COPD (chronic obstructive pulmonary disease)     COPD exacerbation 4/8/2018    Defibrillator discharge     Diabetes mellitus     Diabetes mellitus type II     Diabetes with neurologic complications     Goiter     MNG    HX: breast cancer     Hyperlipidemia     Hypertension     Iron deficiency anemia 5/16/2017    Left kidney mass     Meningioma     Osteoporosis, postmenopausal     Pacemaker     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    Skin cancer     s/p excision    Sleep apnea     CPAP    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/2015    mid forehead    Unspecified vitamin D deficiency     Ventricular tachycardia     Vitamin B12 deficiency     Vitamin D deficiency disease      Past Surgical History:   Procedure Laterality Date    BASAL CELL CARCINOMA EXCISION      posterior neck and nose    BREAST BIOPSY      BREAST CYST EXCISION Left     BREAST SURGERY      CARDIAC DEFIBRILLATOR PLACEMENT      x 2    CATARACT EXTRACTION W/  INTRAOCULAR LENS IMPLANT Bilateral     CHOLECYSTECTOMY      COLONOSCOPY N/A 11/5/2019    Procedure: COLONOSCOPY;  Surgeon: Boaz Botello MD;  Location: [facility] ENDO ([address] [place] [address]);  Service: Endoscopy;  Laterality: N/A;  AICD - [employer] -     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    FRACTURE SURGERY      left elbow and wrist as a child    HYSTERECTOMY      MASTECTOMY Right     REPLACEMENT OF IMPLANTABLE CARDIOVERTER-DEFIBRILLATOR (ICD) GENERATOR N/A 12/17/2018    Procedure: REPLACEMENT, ICD GENERATOR;  Surgeon: Jan Mckeon MD;  Location: [facility] EP LAB;  Service: Cardiology;  Laterality: N/A;  DONNA, CRT-D gen VICKEY leone, MAC, SK, 3 Prep    REVISION OF SKIN POCKET FOR CARDIOVERTER-DEFIBRILLATOR  12/17/2018    Procedure: Revision, Skin Pocket, For Cardioverter-Defibrillator;  Surgeon: Jan Mckeon MD;  Location: [facility] EP LAB;  Service: Cardiology;;    SQUAMOUS CELL CARCINOMA EXCISION      remved from forehead    TONSILLECTOMY       Family History    Problem Relation Age of Onset    Diabetes Father     Heart disease Father     Diabetes Sister     Heart disease Sister     Diabetes Brother     Heart disease Brother     Hypertension Brother     Diabetes Brother     Heart disease Brother     Hypertension Brother     Diabetes Brother     Heart disease Brother     Cancer Brother         colon    Diabetes Brother     Cancer Son         skin    Diabetes Son         prediabetes    Diabetes Daughter         prediabetes    Cancer Daughter         melanoma    Obesity Daughter     Melanoma Daughter     Diabetes Son     Asthma Mother     Hypertension Mother     Stroke Mother     No Known Problems Maternal Grandmother     No Known Problems Maternal Grandfather     No Known Problems Paternal Grandmother     No Known Problems Paternal Grandfather     Amblyopia Neg Hx     Blindness Neg Hx     Cataracts Neg Hx     Glaucoma Neg Hx     Macular degeneration Neg Hx     Retinal detachment Neg Hx     Strabismus Neg Hx     Thyroid disease Neg Hx      Social History     Tobacco Use    Smoking status: Never Smoker    Smokeless tobacco: Never Used   Substance Use Topics    Alcohol use: No     Alcohol/week: 0.0 standard drinks    Drug use: No     Review of Systems   Constitutional: Positive for activity change, appetite change, chills and fatigue. Negative for fever.   HENT: Negative for congestion and sore throat.    Respiratory: Positive for cough and shortness of breath.    Cardiovascular: Negative for chest pain.   Gastrointestinal: Positive for diarrhea, nausea and vomiting. Negative for abdominal pain.   Genitourinary: Negative for decreased urine volume and dysuria.   Musculoskeletal: Negative for back pain.   Skin: Negative for rash.   Neurological: Positive for weakness.   Hematological: Does not bruise/bleed easily.       Physical Exam     Initial Vitals [12/07/19 0957]   BP Pulse Resp Temp SpO2   (!) 197/76 87 20 (!) 102 °F (38.9 °C) (!)  94 %      MAP       --         Physical Exam    Constitutional: She appears well-developed and well-nourished. No distress.   HENT:   Head: Atraumatic.   Mouth/Throat: Mucous membranes are dry.   Eyes: Conjunctivae and EOM are normal. Pupils are equal, round, and reactive to light.   Cardiovascular: Normal rate, regular rhythm and normal heart sounds.   Pulmonary/Chest: Breath sounds normal. No respiratory distress. She has no wheezes. She has no rhonchi. She has no rales.   Abdominal: Soft. Bowel sounds are normal. She exhibits no distension. There is generalized tenderness. There is no rigidity, no rebound, no guarding, no CVA tenderness and negative Gaona's sign.   Neurological: She is alert and oriented to person, place, and time.   Skin: Skin is warm and dry. No rash noted.   Dry, poor skin turgor.         ED Course   Procedures  Labs Reviewed   CBC W/ AUTO DIFFERENTIAL - Abnormal; Notable for the following components:       Result Value    WBC 14.81 (*)     RBC 3.32 (*)     Hemoglobin 9.8 (*)     Hematocrit 31.1 (*)     Mean Corpuscular Hemoglobin Conc 31.5 (*)     Immature Granulocytes 0.7 (*)     Gran # (ANC) 13.5 (*)     Immature Grans (Abs) 0.10 (*)     Lymph # 0.7 (*)     Gran% 90.8 (*)     Lymph% 4.9 (*)     Mono% 3.3 (*)     All other components within normal limits   COMPREHENSIVE METABOLIC PANEL - Abnormal; Notable for the following components:    Sodium 132 (*)     Potassium 3.2 (*)     Glucose 61 (*)     BUN, Bld 50 (*)     Creatinine 2.2 (*)     Albumin 2.5 (*)     eGFR if  23.7 (*)     eGFR if non  20.6 (*)     All other components within normal limits   URINALYSIS, REFLEX TO URINE CULTURE - Abnormal; Notable for the following components:    Appearance, UA Hazy (*)     Protein, UA 2+ (*)     Leukocytes, UA 2+ (*)     All other components within normal limits    Narrative:     Preferred Collection Type->Urine, Clean Catch   CULTURE, BLOOD    Narrative:     Aerobic  and anaerobic   CULTURE, BLOOD    Narrative:     Aerobic and anaerobic   CULTURE, RESPIRATORY   RESPIRATORY INFECTION PANEL, NASOPHARYNGEAL   LACTIC ACID, PLASMA   LACTIC ACID, PLASMA   URINALYSIS MICROSCOPIC    Narrative:     Preferred Collection Type->Urine, Clean Catch   POCT INFLUENZA A/B MOLECULAR   POCT GLUCOSE, HAND-HELD DEVICE   POCT GLUCOSE          Imaging Results          CT Abdomen Pelvis  Without Contrast (Final result)  Result time 12/07/19 12:34:33    Final result by January Skinner MD (12/07/19 12:34:33)                 Impression:      Nonspecific perinephric stranding bilaterally, increased in conspicuity when compared to CT 12/07/2017.  No evidence of nephrolithiasis or hydro nephrosis.  1.9 cm rounded density adjacent to the right ureter is favored to represent phlebolith rather than ureteral stone on is unchanged in size and position since December 7, 2017.    Multiple stable findings including bilateral adrenal nodules as well as a left renal angiomyolipoma.    1.0 cm hyperdense lesion in the right kidney, favoring hemorrhagic cyst.  Follow-up can be performed with a nonemergent ultrasound of the kidneys.    Dense, patchy consolidation with air bronchograms in the right lower lobe, with main considerations including infectious process such as pneumonia.  Aspiration is also a consideration.  This is new since the previous examination.    Other stable findings as above.    Findings reported to Aaliyah Vaughan at 1230.    Electronically signed by resident: Ran Jain  Date:    12/07/2019  Time:    11:36    Electronically signed by: January Skinner MD  Date:    12/07/2019  Time:    12:34             Narrative:    EXAMINATION:  CT ABDOMEN PELVIS WITHOUT CONTRAST    CLINICAL HISTORY:  Infection, abdomen-pelvis;left flank pain;    TECHNIQUE:  Routine axial CT images of the abdomen and pelvis were obtained without the use of IV contrast.  Sagittal and coronal reformatted images were also  acquired.    COMPARISON:  Abdominal radiograph 12/05/2019, chest radiograph 12/07/2019, CT abdomen pelvis without contrast 12/07/2017.    FINDINGS:  Devices: Partially visualized cardiac pacing leads in stable position.    Heart: The heart is normal in size with no pericardial effusion.    Lungs: Dense, patchy consolidation with air bronchograms in the right lower lobe (axial series 2, image 4), with main considerations including infectious process such as pneumonia.  Aspiration is also a consideration. Band like opacities in the left lung, favoring atelectasis versus scarring.    Liver: Normal in size and attenuation, with no focal hepatic lesions.    Gallbladder: Surgically absent.    Bile ducts: No evidence of dilated ducts.    Pancreas: Fatty atrophy of the pancreas as well as multiple stable parenchymal calcifications most prominent at the pancreatic head.    Spleen: The spleen is normal in size.  There is a splenule.    Adrenals: There is a left adrenal nodule measuring 2.6 x 2.1 cm, as well as a right adrenal nodule measuring 2.7 x 1.7 cm, which are stable in size and appearance when compared to CT 12/07/2017.    GI Tract/ Mesentery: No evidence of bowel obstruction or inflammation.  Mild gaseous distention of the colon.  Multiple colonic diverticula without evidence of diverticulitis.  Mild diffuse mesenteric stranding, nonspecific and increased since the previous examination.    Kidneys/ Ureters: The left kidney is smaller than the right kidney, and demonstrates a 1.9 cm which demonstrates mixed fat and soft tissue attenuation, consistent with angiomyolipoma (axial series 2, image 31).  There is nonspecific perinephric stranding bilaterally, increased in conspicuity when compared to CT 12/07/2017.  No nephrolithiasis or hydronephrosis.  There is a 1.0 cm hyperdense lesion in the right kidney, favoring hemorrhagic cyst.  There is a 0.6 cm punctate calcification in the right side of the retroperitoneum,  however is not believed to be within the ureter, and favored to represent phlebolith.  This density can be seen on CT 12/07/2017 in stable position.    Bladder: The bladder is distended with no evidence of wall thickening.  Stable metallic density posterior to the bladder (axial series 2, image 69).    Reproductive organs: The uterus is surgically absent.  Multiple dystrophic calcifications in the pelvis, favoring phleboliths.    Retroperitoneum: No significant adenopathy.    Peritoneal space: No ascites. No free air.    Abdominal wall: Normal.    Vasculature: No evidence of aneurysm.  Mild atherosclerosis of the visualized aorta and its branch vessels.    Bones: No acute fracture. Age-appropriate degenerative changes.                               CT Head Without Contrast (Final result)  Result time 12/07/19 11:46:22    Final result by Rhea Moreira MD (12/07/19 11:46:22)                 Impression:      1. No acute intracranial abnormality.  2. Generalized cerebral volume loss and chronic microvascular ischemic change.  3. Stable partially calcified extra-axial lesion underlying the left parietal bone, likely a meningioma.  4. Subcutaneous soft tissue swelling/hematoma overlying the right parietal bone.  No associated fracture.    Electronically signed by resident: Zach Gonsalves  Date:    12/07/2019  Time:    11:35    Electronically signed by: Rhea Moreira MD  Date:    12/07/2019  Time:    11:46             Narrative:    EXAMINATION:  CT HEAD WITHOUT CONTRAST    CLINICAL HISTORY:  Head trauma, minor, GCS>=13, NOC/NEXUS/CCR neg, first study;    TECHNIQUE:  Low dose axial CT images obtained throughout the head without intravenous contrast. Sagittal and coronal reconstructions were performed.    COMPARISON:  CT head 05/11/2018    FINDINGS:  Intracranial compartment:    Ventricles stable in size without evidence of hydrocephalus. No extra-axial blood or fluid collections.  Stable appearance of a  partially calcified lesion broadly abutting the dura along the left parietal lobe measuring 2.7 x 1.4 cm.  Persistent mild mass effect on the adjacent underlying structures.    There is generalized cerebral volume loss.  Patchy hypoattenuation of the supratentorial white matter is noted consistent with chronic microvascular ischemic change.  No parenchymal mass, hemorrhage, edema or major vascular distribution infarct.    Skull/extracranial contents (limited evaluation): An area of induration/hematoma is identified within the subcutaneous soft tissues overlying the right parietal bone.  No fracture. Near complete opacification of the left sphenoid sinus, similar to prior.  Mastoid air cells and remaining paranasal sinuses are essentially clear.                                X-Ray Chest AP Portable (Final result)  Result time 12/07/19 11:30:01    Final result by January Skinner MD (12/07/19 11:30:01)                 Impression:      Interval development of focal abnormal opacification at the right lung base with possible small left-sided pleural effusion since December 17, 2018.    This report was flagged in Epic as abnormal.      Electronically signed by: January Skinner MD  Date:    12/07/2019  Time:    11:30             Narrative:    EXAMINATION:  XR CHEST AP PORTABLE    CLINICAL HISTORY:  Sepsis;    TECHNIQUE:  Single frontal view of the chest was performed.    COMPARISON:  December 17, 2018.    FINDINGS:  Pacing device remains.  The cardiac silhouette is exaggerated by AP portable technique and probably not enlarged.  Pulmonary vascularity is not increased.  Since the previous study, focal abnormal opacification, which could be due to an area of pneumonic consolidation or aspiration, possibly with atelectasis and a small amount of pleural fluid, has developed at the right lung base medially.  Bandlike opacities, consistent with scarring, are again identified at the left lung base.  The left lateral  costophrenic sulcus has become mildly blunted, which could be due to a small amount of left-sided pleural fluid.  Visualized osseous structures are stable.                                 Medical Decision Making:   History:   Old Medical Records: I decided to obtain old medical records.  Old Records Summarized: records from clinic visits.  Clinical Tests:   Lab Tests: Ordered and Reviewed  Radiological Study: Ordered and Reviewed  Medical Tests: Ordered and Reviewed       APC / Resident Notes:   79 year old female presenting with fever and generalized weakness.  Triage vitals temp 102F, HR 87, resp 20, /76, O2 94%. She appears ill. Mucous membranes are very tacky/dry, poor skin turgor. Lungs with decreased breath sounds. Abdomen is soft with mild generalized tenderness.     DDx includes but is not limited to sepsis, UTI, pyelonephritis, PNA, influenza.    Labs demonstrate WBC 14.81, H&H 9/31, K 3.2, Cr 2.2 (baseline 1.1), glucose 61, lactate 0.9.  Urinalysis 2+ leukocytes, negative nitrites.   Head CT negative for acute process, there is a soft tissue hematoma.   CXR with opacification of right lung base.  CT abd pelvis with bilateral perinephric stranding, multiple stable findings. Again consolidation visualized in right lower lobe.    Patient given broad spectrum antibiotics (renal dose adjusted vanc) and IV fluids per sepsis protocol. Plan for admission. I discussed the care of this patient with my supervising MD.                             Clinical Impression:       ICD-10-CM ICD-9-CM   1. Sepsis A41.9 038.9     995.91   2. Fever R50.9 780.60   3. Pyelonephritis N12 590.80   4. Pneumonia of right lower lobe due to infectious organism J18.1 486   5. Acute kidney injury N17.9 584.9   6. Chest pain R07.9 786.50         Disposition:   Disposition: Admitted  Condition: Echo Vaughan PA-C  12/07/19 4432

## 2019-12-07 NOTE — ED TRIAGE NOTES
Pt arrives with daughter and . Pt states on Wednesday she had nausea, vomiting, diarrhea, and feeling weak. Pt visited urgent care on Wednesday, told her she had a UTI. Pt states n/v and diarrhea have resolved since Wednesday, but feeling of weakness has gotten worse. Pt describes weakness as feeling tired and like she's going to pass out when she stands up.    Pt reports falling at 2100 yesterday while walking at home. Pt reports sliding down the wall, hitting her head on the floor. Pt denies losing consciousness.    Pt denies changes to urination. Pt reports last bowel movement yesterday morning which was diarrhea.

## 2019-12-08 PROBLEM — J18.9 PNEUMONIA: Status: ACTIVE | Noted: 2019-12-08

## 2019-12-08 LAB
ALBUMIN SERPL BCP-MCNC: 1.9 G/DL (ref 3.5–5.2)
ALP SERPL-CCNC: 89 U/L (ref 55–135)
ALT SERPL W/O P-5'-P-CCNC: 12 U/L (ref 10–44)
ANION GAP SERPL CALC-SCNC: 9 MMOL/L (ref 8–16)
AST SERPL-CCNC: 24 U/L (ref 10–40)
BASOPHILS # BLD AUTO: 0.04 K/UL (ref 0–0.2)
BASOPHILS # BLD AUTO: 0.07 K/UL (ref 0–0.2)
BASOPHILS NFR BLD: 0.3 % (ref 0–1.9)
BASOPHILS NFR BLD: 0.5 % (ref 0–1.9)
BILIRUB SERPL-MCNC: 0.4 MG/DL (ref 0.1–1)
BUN SERPL-MCNC: 41 MG/DL (ref 8–23)
CALCIUM SERPL-MCNC: 7.9 MG/DL (ref 8.7–10.5)
CHLORIDE SERPL-SCNC: 101 MMOL/L (ref 95–110)
CO2 SERPL-SCNC: 23 MMOL/L (ref 23–29)
CREAT SERPL-MCNC: 1.6 MG/DL (ref 0.5–1.4)
DIFFERENTIAL METHOD: ABNORMAL
DIFFERENTIAL METHOD: ABNORMAL
DOHLE BOD BLD QL SMEAR: PRESENT
EOSINOPHIL # BLD AUTO: 0 K/UL (ref 0–0.5)
EOSINOPHIL # BLD AUTO: 0 K/UL (ref 0–0.5)
EOSINOPHIL NFR BLD: 0 % (ref 0–8)
EOSINOPHIL NFR BLD: 0.2 % (ref 0–8)
ERYTHROCYTE [DISTWIDTH] IN BLOOD BY AUTOMATED COUNT: 12.8 % (ref 11.5–14.5)
ERYTHROCYTE [DISTWIDTH] IN BLOOD BY AUTOMATED COUNT: 12.9 % (ref 11.5–14.5)
EST. GFR  (AFRICAN AMERICAN): 34.8 ML/MIN/1.73 M^2
EST. GFR  (NON AFRICAN AMERICAN): 30.2 ML/MIN/1.73 M^2
GIANT PLATELETS BLD QL SMEAR: PRESENT
GLUCOSE SERPL-MCNC: 65 MG/DL (ref 70–110)
HCT VFR BLD AUTO: 25.7 % (ref 37–48.5)
HCT VFR BLD AUTO: 30.2 % (ref 37–48.5)
HGB BLD-MCNC: 8.2 G/DL (ref 12–16)
HGB BLD-MCNC: 9.8 G/DL (ref 12–16)
IMM GRANULOCYTES # BLD AUTO: 0.07 K/UL (ref 0–0.04)
IMM GRANULOCYTES # BLD AUTO: 0.15 K/UL (ref 0–0.04)
IMM GRANULOCYTES NFR BLD AUTO: 0.5 % (ref 0–0.5)
IMM GRANULOCYTES NFR BLD AUTO: 1 % (ref 0–0.5)
LYMPHOCYTES # BLD AUTO: 0.9 K/UL (ref 1–4.8)
LYMPHOCYTES # BLD AUTO: 1 K/UL (ref 1–4.8)
LYMPHOCYTES NFR BLD: 6.1 % (ref 18–48)
LYMPHOCYTES NFR BLD: 6.5 % (ref 18–48)
MAGNESIUM SERPL-MCNC: 1.7 MG/DL (ref 1.6–2.6)
MCH RBC QN AUTO: 30 PG (ref 27–31)
MCH RBC QN AUTO: 30.6 PG (ref 27–31)
MCHC RBC AUTO-ENTMCNC: 31.9 G/DL (ref 32–36)
MCHC RBC AUTO-ENTMCNC: 32.5 G/DL (ref 32–36)
MCV RBC AUTO: 94 FL (ref 82–98)
MCV RBC AUTO: 94 FL (ref 82–98)
MONOCYTES # BLD AUTO: 0.7 K/UL (ref 0.3–1)
MONOCYTES # BLD AUTO: 0.9 K/UL (ref 0.3–1)
MONOCYTES NFR BLD: 4.6 % (ref 4–15)
MONOCYTES NFR BLD: 5.8 % (ref 4–15)
NEUTROPHILS # BLD AUTO: 13 K/UL (ref 1.8–7.7)
NEUTROPHILS # BLD AUTO: 13.2 K/UL (ref 1.8–7.7)
NEUTROPHILS NFR BLD: 86.4 % (ref 38–73)
NEUTROPHILS NFR BLD: 88.1 % (ref 38–73)
NRBC BLD-RTO: 0 /100 WBC
NRBC BLD-RTO: 0 /100 WBC
PHOSPHATE SERPL-MCNC: 3.3 MG/DL (ref 2.7–4.5)
PLATELET # BLD AUTO: 160 K/UL (ref 150–350)
PLATELET # BLD AUTO: 212 K/UL (ref 150–350)
PLATELET BLD QL SMEAR: ABNORMAL
PMV BLD AUTO: 11.6 FL (ref 9.2–12.9)
PMV BLD AUTO: 12.3 FL (ref 9.2–12.9)
POCT GLUCOSE: 115 MG/DL (ref 70–110)
POCT GLUCOSE: 232 MG/DL (ref 70–110)
POCT GLUCOSE: 311 MG/DL (ref 70–110)
POCT GLUCOSE: 82 MG/DL (ref 70–110)
POCT GLUCOSE: 86 MG/DL (ref 70–110)
POTASSIUM SERPL-SCNC: 3.6 MMOL/L (ref 3.5–5.1)
PROT SERPL-MCNC: 5.6 G/DL (ref 6–8.4)
RBC # BLD AUTO: 2.73 M/UL (ref 4–5.4)
RBC # BLD AUTO: 3.2 M/UL (ref 4–5.4)
SODIUM SERPL-SCNC: 133 MMOL/L (ref 136–145)
TOXIC GRANULES BLD QL SMEAR: PRESENT
WBC # BLD AUTO: 14.76 K/UL (ref 3.9–12.7)
WBC # BLD AUTO: 15.29 K/UL (ref 3.9–12.7)

## 2019-12-08 PROCEDURE — 25000003 PHARM REV CODE 250: Mod: HCNC | Performed by: STUDENT IN AN ORGANIZED HEALTH CARE EDUCATION/TRAINING PROGRAM

## 2019-12-08 PROCEDURE — 99900035 HC TECH TIME PER 15 MIN (STAT): Mod: HCNC

## 2019-12-08 PROCEDURE — 97116 GAIT TRAINING THERAPY: CPT | Mod: HCNC

## 2019-12-08 PROCEDURE — 25000242 PHARM REV CODE 250 ALT 637 W/ HCPCS: Mod: HCNC | Performed by: STUDENT IN AN ORGANIZED HEALTH CARE EDUCATION/TRAINING PROGRAM

## 2019-12-08 PROCEDURE — 94660 CPAP INITIATION&MGMT: CPT | Mod: HCNC

## 2019-12-08 PROCEDURE — 85025 COMPLETE CBC W/AUTO DIFF WBC: CPT | Mod: HCNC

## 2019-12-08 PROCEDURE — 27000190 HC CPAP FULL FACE MASK W/VALVE: Mod: HCNC

## 2019-12-08 PROCEDURE — 36415 COLL VENOUS BLD VENIPUNCTURE: CPT | Mod: HCNC

## 2019-12-08 PROCEDURE — 63600175 PHARM REV CODE 636 W HCPCS: Mod: HCNC | Performed by: STUDENT IN AN ORGANIZED HEALTH CARE EDUCATION/TRAINING PROGRAM

## 2019-12-08 PROCEDURE — 84100 ASSAY OF PHOSPHORUS: CPT | Mod: HCNC

## 2019-12-08 PROCEDURE — 99232 PR SUBSEQUENT HOSPITAL CARE,LEVL II: ICD-10-PCS | Mod: HCNC,GC,, | Performed by: HOSPITALIST

## 2019-12-08 PROCEDURE — 97166 OT EVAL MOD COMPLEX 45 MIN: CPT | Mod: HCNC

## 2019-12-08 PROCEDURE — 97161 PT EVAL LOW COMPLEX 20 MIN: CPT | Mod: HCNC

## 2019-12-08 PROCEDURE — 83735 ASSAY OF MAGNESIUM: CPT | Mod: HCNC

## 2019-12-08 PROCEDURE — 25000003 PHARM REV CODE 250: Mod: HCNC | Performed by: INTERNAL MEDICINE

## 2019-12-08 PROCEDURE — 99232 SBSQ HOSP IP/OBS MODERATE 35: CPT | Mod: HCNC,GC,, | Performed by: HOSPITALIST

## 2019-12-08 PROCEDURE — 27000221 HC OXYGEN, UP TO 24 HOURS: Mod: HCNC

## 2019-12-08 PROCEDURE — 11000001 HC ACUTE MED/SURG PRIVATE ROOM: Mod: HCNC

## 2019-12-08 PROCEDURE — 80053 COMPREHEN METABOLIC PANEL: CPT | Mod: HCNC

## 2019-12-08 PROCEDURE — 94761 N-INVAS EAR/PLS OXIMETRY MLT: CPT | Mod: HCNC

## 2019-12-08 PROCEDURE — 94667 MNPJ CHEST WALL 1ST: CPT | Mod: HCNC

## 2019-12-08 PROCEDURE — 94640 AIRWAY INHALATION TREATMENT: CPT | Mod: HCNC

## 2019-12-08 RX ORDER — LIDOCAINE 50 MG/G
1 PATCH TOPICAL
Status: DISCONTINUED | OUTPATIENT
Start: 2019-12-08 | End: 2019-12-10 | Stop reason: HOSPADM

## 2019-12-08 RX ORDER — NITROFURANTOIN 25; 75 MG/1; MG/1
100 CAPSULE ORAL 2 TIMES DAILY
Status: ON HOLD | COMMUNITY
End: 2019-12-10 | Stop reason: HOSPADM

## 2019-12-08 RX ORDER — CARVEDILOL 25 MG/1
25 TABLET ORAL 2 TIMES DAILY
Status: DISCONTINUED | OUTPATIENT
Start: 2019-12-08 | End: 2019-12-09

## 2019-12-08 RX ORDER — POTASSIUM CHLORIDE 20 MEQ/1
20 TABLET, EXTENDED RELEASE ORAL ONCE
Status: COMPLETED | OUTPATIENT
Start: 2019-12-08 | End: 2019-12-08

## 2019-12-08 RX ORDER — ACETAMINOPHEN 500 MG
1000 TABLET ORAL DAILY PRN
COMMUNITY
End: 2022-03-09

## 2019-12-08 RX ORDER — ACETAMINOPHEN 325 MG/1
650 TABLET ORAL EVERY 6 HOURS
Status: DISCONTINUED | OUTPATIENT
Start: 2019-12-08 | End: 2019-12-10 | Stop reason: HOSPADM

## 2019-12-08 RX ADMIN — ACETAMINOPHEN 650 MG: 325 TABLET ORAL at 06:12

## 2019-12-08 RX ADMIN — POTASSIUM CHLORIDE 20 MEQ: 1500 TABLET, EXTENDED RELEASE ORAL at 08:12

## 2019-12-08 RX ADMIN — INSULIN ASPART 4 UNITS: 100 INJECTION, SOLUTION INTRAVENOUS; SUBCUTANEOUS at 05:12

## 2019-12-08 RX ADMIN — LIDOCAINE 1 PATCH: 50 PATCH TOPICAL at 08:12

## 2019-12-08 RX ADMIN — FLUTICASONE PROPIONATE 100 MCG: 50 SPRAY, METERED NASAL at 08:12

## 2019-12-08 RX ADMIN — Medication 400 MG: at 08:12

## 2019-12-08 RX ADMIN — POTASSIUM CHLORIDE 20 MEQ: 1500 TABLET, EXTENDED RELEASE ORAL at 01:12

## 2019-12-08 RX ADMIN — HYDRALAZINE HYDROCHLORIDE 100 MG: 50 TABLET, FILM COATED ORAL at 08:12

## 2019-12-08 RX ADMIN — IPRATROPIUM BROMIDE AND ALBUTEROL SULFATE 3 ML: .5; 3 SOLUTION RESPIRATORY (INHALATION) at 12:12

## 2019-12-08 RX ADMIN — PRAVASTATIN SODIUM 40 MG: 40 TABLET ORAL at 08:12

## 2019-12-08 RX ADMIN — ACETAMINOPHEN 650 MG: 325 TABLET ORAL at 11:12

## 2019-12-08 RX ADMIN — HYDRALAZINE HYDROCHLORIDE 100 MG: 50 TABLET, FILM COATED ORAL at 09:12

## 2019-12-08 RX ADMIN — IPRATROPIUM BROMIDE AND ALBUTEROL SULFATE 3 ML: .5; 3 SOLUTION RESPIRATORY (INHALATION) at 07:12

## 2019-12-08 RX ADMIN — HYDRALAZINE HYDROCHLORIDE 100 MG: 50 TABLET, FILM COATED ORAL at 03:12

## 2019-12-08 RX ADMIN — ENOXAPARIN SODIUM 30 MG: 100 INJECTION SUBCUTANEOUS at 04:12

## 2019-12-08 RX ADMIN — ACETAMINOPHEN 650 MG: 325 TABLET ORAL at 01:12

## 2019-12-08 RX ADMIN — CARVEDILOL 25 MG: 25 TABLET, FILM COATED ORAL at 05:12

## 2019-12-08 RX ADMIN — AZITHROMYCIN 500 MG: 250 TABLET, FILM COATED ORAL at 08:12

## 2019-12-08 RX ADMIN — IPRATROPIUM BROMIDE AND ALBUTEROL SULFATE 3 ML: .5; 3 SOLUTION RESPIRATORY (INHALATION) at 03:12

## 2019-12-08 RX ADMIN — FLUTICASONE FUROATE AND VILANTEROL TRIFENATATE 1 PUFF: 100; 25 POWDER RESPIRATORY (INHALATION) at 08:12

## 2019-12-08 RX ADMIN — INSULIN ASPART 1 UNITS: 100 INJECTION, SOLUTION INTRAVENOUS; SUBCUTANEOUS at 09:12

## 2019-12-08 RX ADMIN — CEFTRIAXONE SODIUM 1 G: 1 INJECTION, POWDER, FOR SOLUTION INTRAMUSCULAR; INTRAVENOUS at 08:12

## 2019-12-08 NOTE — CONSULTS
"  [facility]  Adult Nutrition  Consult Note    SUMMARY     Recommendations    Recommendation/Intervention:   1. Continue current diet order as tolerated.    -If PO intake <50%, recommend Boost Glucose TID. Will monitor.   Goals: Pt to meet % EEN and EPN by RD follow-up.   Nutrition Goal Status: new  Communication of RD Recs: other (comment)(POC)    Reason for Assessment    Reason For Assessment: consult  Diagnosis: infection/sepsis  Relevant Medical History: HTN, DM, CAD, CHF, breast CA, BCC  General Information Comments: Unable to speak with pt during visit. Pt on diabetic diet, noted to have decreased intake r/t sepsis and possible UTI. Noted stable wt X 1 year+. Likely does not meet criteria for malnutrition at this time.   Nutrition Discharge Planning: Adequate PO intake for optimal nutrition.     Nutrition/Diet History    Spiritual, Cultural Beliefs, Baptism Practices, Values that Affect Care: no  Factors Affecting Nutritional Intake: decreased appetite    Anthropometrics    Temp: 97.7 °F (36.5 °C)  Height Method: Stated  Height: 5' 3" (160 cm)  Height (inches): 63 in  Weight Method: Standard Scale  Weight: 71.5 kg (157 lb 10.1 oz)  Weight (lb): 157.63 lb  Ideal Body Weight (IBW), Female: 115 lb  % Ideal Body Weight, Female (lb): 137.07   BMI (Calculated): 27.9  BMI Grade: 25 - 29.9 - overweight     Lab/Procedures/Meds    Pertinent Labs Reviewed: reviewed  Pertinent Labs Comments: Na 133, BUN 41, Cr 1.6, Glu 65, Ca 7.9  Pertinent Medications Reviewed: reviewed    Estimated/Assessed Needs    Weight Used For Calorie Calculations: 71.5 kg (157 lb 10.1 oz)  Energy Calorie Requirements (kcal): 1443  Energy Need Method: Nemo-St Jeor(1.25 PAL)  Protein Requirements: 71-85g (1-1.2g/kg)  Weight Used For Protein Calculations: 71.5 kg (157 lb 10.1 oz)  Fluid Requirements (mL): Per MD  RDA Method (mL): 1443     Nutrition Prescription Ordered    Current Diet Order: Diabetic " 2000al    Evaluation of Received Nutrient/Fluid Intake    I/O: +3L since admit  Comments: LBM 12/5     Nutrition Risk    Level of Risk/Frequency of Follow-up: (1X/week)     Assessment and Plan    No nutrition related risk factor present at this time.     Monitor and Evaluation    Food and Nutrient Intake: energy intake, food and beverage intake  Food and Nutrient Adminstration: diet order  Anthropometric Measurements: weight, weight change  Biochemical Data, Medical Tests and Procedures: other (specify)(All labs)  Nutrition-Focused Physical Findings: overall appearance     Nutrition Follow-Up    RD Follow-up?: Yes

## 2019-12-08 NOTE — ASSESSMENT & PLAN NOTE
80yo F presenting with general weakness, cough with increased sputum production of greenish color, fevers and leukocytosis recently seen in urgent care for UTI and prescribed macrobid.   - CXR with R lung base focal opacity concerning for PNA.   - UA with 2+ prot, 2+ leuk, negative nitrites, 2 RBC, 1 WBC, rare bacteria  - flu A&B negative    - sepsis likely 2/2 PNA vs UTI  - follow-up blood Cx  - follow-up urine Cx  - respiratory infection panel, respiratory cx  - start abx with azithro/ceftriaxone for empiric coverage of CAP and UTI

## 2019-12-08 NOTE — PT/OT/SLP EVAL
"Physical Therapy Evaluation    Patient Name:  Myesha Quinones   MRN:  [id number]    Recommendations:     Discharge Recommendations:  home health PT   Discharge Equipment Recommendations: walker, rolling   Barriers to discharge: None    Assessment:     Myesha Quinones is a 79 y.o. female admitted with a medical diagnosis of Sepsis.  She presents with the following impairments/functional limitations:  weakness, gait instability, impaired endurance, impaired balance, impaired functional mobilty, pain, impaired cardiopulmonary response to activity . Pt is motivated to progress with functional mobility. Pt is at risk for fall with gait without assist. Pt requires minimal assist for transfers and gait with no AD. Pt requires CGA for gait with Rw. Pt is motivated to progress with functional mobility.    Rehab Prognosis: Good; patient would benefit from acute skilled PT services to address these deficits and reach maximum level of function.    Recent Surgery: * No surgery found *      Plan:     During this hospitalization, patient to be seen 4 x/week to address the identified rehab impairments via gait training, therapeutic activities, therapeutic exercises, neuromuscular re-education and progress toward the following goals:    · Plan of Care Expires:  01/07/20    Subjective   "It feels so good to be up and walking."    Pain/Comfort:  · Pain Rating 1: 5/10  · Location - Orientation 1: upper  · Location 1: back  · Pain Addressed 1: Reposition, Cessation of Activity  · Pain Rating Post-Intervention 1: 5/10    Patients cultural, spiritual, Orthodoxy conflicts given the current situation: no    Living Environment:  Pt lives with  in a 1 story home with no TAMEKA.  Prior to admission, patients level of function was independent.  Equipment used at home: none.  Upon discharge, patient will have assistance from .    Objective:     Communicated with nurse prior to session.  Patient found supine with Colin, " oxygen, telemetry, pulse ox (continuous)  upon PT entry to room.    General Precautions: Standard, fall   Orthopedic Precautions:N/A   Braces: N/A     Exams:  · Cognitive Exam:  Patient is oriented to Person, Place, Time and Situation  · Sensation:    · -       Intact  light/touch B LE  · RLE ROM: WFL  · RLE Strength: WFL except hip flex 4-/5  · LLE ROM: WFL  · LLE Strength: WFL except hip flex 4-/5    Functional Mobility:  · Bed Mobility:     · Supine to Sit: supervision  · Transfers:     · Sit to Stand:  minimum assistance with no AD from bed 1st trial and rolling walker from bedside chair 2nd trial  · Gait: 80ft with no AD with minimal assist then 60ft with Rw with CGA . Pt SOB and required rest period in sitting between trials. pt's oxygen sat on RA after gait 87%, increased to 92% after seated rest on 1LPM. Nurse present and notified  · Pt's nurse, pt and her  educated that pt requires 1 person assist for mobility at this time. Pt states she will call for assist.    AM-PAC 6 CLICK MOBILITY  Total Score:18     Patient left up in chair with all lines intact, call button in reach, nurse notified and  present.    GOALS:   Multidisciplinary Problems     Physical Therapy Goals        Problem: Physical Therapy Goal    Goal Priority Disciplines Outcome Goal Variances Interventions   Physical Therapy Goal     PT, PT/OT Ongoing, Progressing     Description:  PT goals until 12/16/19    1.  Pt sit to stand with or without RW as needed for safety with supervision-not met  2. Pt to perform gait 200ft with or without Rw as needed for safety with supervision.-not met  3. Pt to perform B LE exs in sitting or supine x 10 reps to strengthen B LE to improve functional mobility.-not met                      History:     Past Medical History:   Diagnosis Date    Allergy     Asthma     Basal cell carcinoma     left forehead    Basal cell carcinoma     left nose    Basal cell carcinoma 05/20/2015    right nose     Basal cell carcinoma 12/22/2015    left lower post neck    Breast cancer     CAD (coronary artery disease)     Cardiomyopathy     Cardiomyopathy, ischemic     Cataract     CHF (congestive heart failure)     Chronic kidney disease, stage 3, mod decreased GFR 2/14/2017    Colon polyp 2011    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, with long-term current use of insulin 2/22/2018    COPD (chronic obstructive pulmonary disease)     COPD exacerbation 4/8/2018    Defibrillator discharge     Diabetes mellitus     Diabetes mellitus type II     Diabetes with neurologic complications     Goiter     MNG    HX: breast cancer     Hyperlipidemia     Hypertension     Iron deficiency anemia 5/16/2017    Left kidney mass     Meningioma     Osteoporosis, postmenopausal     Pacemaker     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    Skin cancer     s/p excision    Sleep apnea     CPAP    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/2015    mid forehead    Unspecified vitamin D deficiency     Ventricular tachycardia     Vitamin B12 deficiency     Vitamin D deficiency disease        Past Surgical History:   Procedure Laterality Date    BASAL CELL CARCINOMA EXCISION      posterior neck and nose    BREAST BIOPSY      BREAST CYST EXCISION Left     BREAST SURGERY      CARDIAC DEFIBRILLATOR PLACEMENT      x 2    CATARACT EXTRACTION W/  INTRAOCULAR LENS IMPLANT Bilateral     CHOLECYSTECTOMY      COLONOSCOPY N/A 11/5/2019    Procedure: COLONOSCOPY;  Surgeon: Boaz Botello MD;  Location: 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    FRACTURE SURGERY      left elbow and wrist as a child    HYSTERECTOMY      MASTECTOMY Right     REPLACEMENT OF IMPLANTABLE CARDIOVERTER-DEFIBRILLATOR (ICD) GENERATOR N/A 12/17/2018    Procedure: REPLACEMENT, ICD GENERATOR;   Surgeon: Jan Mckeon MD;  Location: [facility];  Service: Cardiology;  Laterality: N/A;  DONNA, CRT-D gen change, MDT, MAC, SK, 3 Prep    REVISION OF SKIN POCKET FOR CARDIOVERTER-DEFIBRILLATOR  12/17/2018    Procedure: Revision, Skin Pocket, For Cardioverter-Defibrillator;  Surgeon: Jan Mckeon MD;  Location: [facility];  Service: Cardiology;;    SQUAMOUS CELL CARCINOMA EXCISION      remved from forehead    TONSILLECTOMY         Time Tracking:     PT Received On: 12/08/19  PT Start Time: 1155     PT Stop Time: 1216  PT Total Time (min): 21 min     Billable Minutes: Evaluation 11 and Gait Training 10      Celeste Bernard, PT  12/08/2019

## 2019-12-08 NOTE — PLAN OF CARE
Problem: Physical Therapy Goal  Goal: Physical Therapy Goal  Description  PT goals until 12/16/19    1.  Pt sit to stand with or without RW as needed for safety with supervision-not met  2. Pt to perform gait 200ft with or without Rw as needed for safety with supervision.-not met  3. Pt to perform B LE exs in sitting or supine x 10 reps to strengthen B LE to improve functional mobility.-not met     Outcome: Ongoing, Progressing   Pt's goals set and pt will benefit from skilled PT services to work towards improved functional mobility including: transfers, therapeutic exercise, and gait.   Celeste Bernard, PT  12/8/2019

## 2019-12-08 NOTE — PHARMACY MED REC
"Admission Medication Reconciliation - Pharmacy Consult Note    The home medication history was taken by Livier Julian, Pharmacy Technician.  Based on information gathered and subsequent review by the clinical pharmacist, the items below may need attention.     You may go to "Admission" then "Reconcile Home Medications" tabs to review and/or act upon these items.     Potentially problematic discrepancies with current MAR  o Patient IS taking the following which was not ordered upon admit  o ASA 81 mg QD   o Carvedilol 25 mg BID   o Chlorthalidone 25 mg QD (held)   o Vitamin D3 2000 Units QD  o Cardizem  mg QD  o Lantus 34 Units QPM (held)   o Irbesartan 300 mg QPM (held)  o Patient is taking a drug DIFFERENTLY than how ordered upon admit  o Hydralazine 100 mg BID (ordered as TID)     Please address this information as you see fit.  Feel free to contact us if you have any questions or require assistance.    Yolanda Flores  EXT [phone]       .    .            "

## 2019-12-08 NOTE — HPI
Ms. Myesha Quinones is an 80yo F with hx of HTN, DM, CAD and CHF s/p ICD, asthma, and remote hx of breast cancer as well as BCC who presents to the ED complaining of general weakness. Patient reports onset of back pain, chills, and nausea/vomiting x 4 days on 12/4. On Thurs 12/5, she went to urgent care and was found to have a UTI. She was prescribed macrobid, which she has been taking up until today. Patient has continued to feel poorly with decreased po intake and overall weakness. Yesterday 12/6, patient had a fall while walking between rooms with her  due to weakness. She hit her head on the wall and fell to the floor, but did not have LOC. Patient denies dysuria, hematuria, abdominal pain. Patient endorses chronic cough with sputum production that has recently changed color to dark/greenish. She has not had any difficulty breathing until arriving to the ED. Denies any recent sick contacts. Patient is also complaining of back pain that is worse with movement but not necessarily associated with breathing/inspiration.     On arrival to the ED, patient was febrile with T 102, HR 87, RR 20, /76, spO2 94% on RA. Lactate WNL. UA had 2+ leukocytes, negative nitrites, 2 RBC, 1 WBC, and rare bacteria. CXR showed right lung base focal opacity. CTH was negative for hemorrhage. CT abdo without any acute processes, no hydronephrosis or nephrolithiasis. She was given tylenol, 1 x dose vanc/zosyn/azithro, and ~2L IVF NS in the ED.

## 2019-12-08 NOTE — ASSESSMENT & PLAN NOTE
Patient with hx of CKD III 2/2 DM. Baseline Cr ~ 1.2-1.4  - s/p IVF resuscitation 2L NS  - daily CMP  - monitor Cr, may require more fluids

## 2019-12-08 NOTE — ASSESSMENT & PLAN NOTE
Home meds include: carvedilol 50 BID, irbesartan 300mg, diltiazem 120mg,chlorthalidone 25mg, hydralazine 100mg TID, clonidine patch 0.1mg    - held BP meds in the setting of sepsis  - resume as clinically appropriate  - resume hydralazine and clonidine patch

## 2019-12-08 NOTE — PT/OT/SLP EVAL
Occupational Therapy   Evaluation and Discharge Note    Name: Myesha Quinones  MRN: [id number]  Admitting Diagnosis:  Sepsis      Recommendations:     Discharge Recommendations: home health OT  Discharge Equipment Recommendations:  walker, rolling  Barriers to discharge:  None    Assessment:     Myesha Quinones is a 79 y.o. female with a medical diagnosis of Sepsis. At this time, patient is functioning at their prior level of function and does not require further acute OT services.     Plan:     During this hospitalization, patient does not require further acute OT services.  Please re-consult if situation changes.    · Plan of Care Reviewed with: patient, spouse    Subjective     Chief Complaint: Weakness  Patient/Family Comments/goals: Medical management and discharge.       Occupational Profile:  Living Environment: Pt lives with spouse in [facility], with 0 TAMEKA, and Tub/shower combo.  Previous level of function: Active, Independent w/ ADLs/IADLs  Roles and Routines: Mother/grandmother/great-grandmother, tends the yard, travels  Equipment Used at home:  none  Assistance upon Discharge: Yes from spouse and family.    Pain/Comfort:  · Pain Rating 1: 0/10  · Pain Rating Post-Intervention 1: 0/10    Patients cultural, spiritual, Anglican conflicts given the current situation: no    Objective:     Communicated with: RN prior to session.  Patient found HOB elevated with PureWick, oxygen, telemetry, pulse ox (continuous) upon OT entry to room.    General Precautions: Standard, fall   Orthopedic Precautions:N/A   Braces: N/A     Occupational Performance:    Bed Mobility:    · Patient completed Rolling/Turning to Left with  supervision  · Patient completed Rolling/Turning to Right with supervision  · Patient completed Scooting/Bridging with supervision  · Patient completed Supine to Sit with supervision  · Patient completed Sit to Supine with supervision    Functional Mobility/Transfers:  · Patient completed Sit <>  Stand Transfer with modified independence  with  no assistive device   · Patient completed Toilet Transfer Step Transfer technique with modified independence with  no AD  · Functional Mobility: Able to walk short household distances w/ no AD (some funiture and wall cruising) w/ Mod I.    Activities of Daily Living:  · Grooming: independence standing at sink  · Bathing: independence standing at sink  · Upper Body Dressing: supervision seated at EOB w/ setup  · Lower Body Dressing: supervision seated at EOB w/ setup  · Toileting: independence seated on toilet    Cognitive/Visual Perceptual:  Completely cognitively intact adult w/ no glasses worn or present during eval.       Physical Exam:  Physically capable to resume normal activities of daily living.       AMPAC 6 Click ADL:  AMPAC Total Score: 24    Treatment & Education:  -Pt edu on OT role/POC  -Importance of OOB activity with staff assistance  -Safety during functional t/f and mobility  - White board updated  - Multiple self care tasks/functional mobility completed-- assistance level noted above  - All questions/concerns answered within OT scope of practice    Education:    Patient left HOB elevated with all lines intact, call button in reach, RN notified and spouse, RN present    GOALS:   Multidisciplinary Problems     Occupational Therapy Goals     Not on file          Multidisciplinary Problems (Resolved)        Problem: Occupational Therapy Goal    Goal Priority Disciplines Outcome Interventions   Occupational Therapy Goal   (Resolved)     OT, PT/OT Met                    History:     Past Medical History:   Diagnosis Date    Allergy     Asthma     Basal cell carcinoma     left forehead    Basal cell carcinoma     left nose    Basal cell carcinoma 05/20/2015    right nose    Basal cell carcinoma 12/22/2015    left lower post neck    Breast cancer     CAD (coronary artery disease)     Cardiomyopathy     Cardiomyopathy, ischemic     Cataract      CHF (congestive heart failure)     Chronic kidney disease, stage 3, mod decreased GFR 2/14/2017    Colon polyp 2011    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, with long-term current use of insulin 2/22/2018    COPD (chronic obstructive pulmonary disease)     COPD exacerbation 4/8/2018    Defibrillator discharge     Diabetes mellitus     Diabetes mellitus type II     Diabetes with neurologic complications     Goiter     MNG    HX: breast cancer     Hyperlipidemia     Hypertension     Iron deficiency anemia 5/16/2017    Left kidney mass     Meningioma     Osteoporosis, postmenopausal     Pacemaker     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    Skin cancer     s/p excision    Sleep apnea     CPAP    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/2015    mid forehead    Unspecified vitamin D deficiency     Ventricular tachycardia     Vitamin B12 deficiency     Vitamin D deficiency disease        Past Surgical History:   Procedure Laterality Date    BASAL CELL CARCINOMA EXCISION      posterior neck and nose    BREAST BIOPSY      BREAST CYST EXCISION Left     BREAST SURGERY      CARDIAC DEFIBRILLATOR PLACEMENT      x 2    CATARACT EXTRACTION W/  INTRAOCULAR LENS IMPLANT Bilateral     CHOLECYSTECTOMY      COLONOSCOPY N/A 11/5/2019    Procedure: COLONOSCOPY;  Surgeon: Boaz Botello MD;  Location: [facility] ([address] [place] [address]);  Service: Endoscopy;  Laterality: N/A;  AICD - Medtronic -     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    FRACTURE SURGERY      left elbow and wrist as a child    HYSTERECTOMY      MASTECTOMY Right     REPLACEMENT OF IMPLANTABLE CARDIOVERTER-DEFIBRILLATOR (ICD) GENERATOR N/A 12/17/2018    Procedure: REPLACEMENT, ICD GENERATOR;  Surgeon: Jan Mckeon MD;  Location: [facility] EP [facility];  Service: Cardiology;  Laterality: N/A;  DONNA, CRT-D gen sulema, MDT, MAC, SK, 3 Prep    REVISION OF SKIN POCKET FOR  CARDIOVERTER-DEFIBRILLATOR  12/17/2018    Procedure: Revision, Skin Pocket, For Cardioverter-Defibrillator;  Surgeon: Jan Mckeon MD;  Location: [facility];  Service: Cardiology;;    SQUAMOUS CELL CARCINOMA EXCISION      remved from forehead    TONSILLECTOMY         Time Tracking:     OT Date of Treatment: 12/08/19  OT Start Time: 1543  OT Stop Time: 1559  OT Total Time (min): 16 min    Billable Minutes:Evaluation 16 mins    Satish Gardiner, OT  12/8/2019

## 2019-12-08 NOTE — H&P
[facility] Medicine  History & Physical    Patient Name: Myesha Quinones  MRN: [id number]  Admission Date: 12/7/2019  Attending Physician: Kelvin Young MD   Primary Care Provider: Emelina Gaston MD    [facility] Medicine Team: [facility] MED 1 Eufemia Cortes MD     Patient information was obtained from patient, spouse/SO, relative(s), past medical records and ER records.     Subjective:     Principal Problem:Sepsis    Chief Complaint:   Chief Complaint   Patient presents with    Urinary Tract Infection     on antibiotics feeling weak        HPI: Ms. Myesha Quinones is an 78yo F with hx of HTN, DM, CAD and CHF s/p ICD, asthma, and remote hx of breast cancer as well as BCC who presents to the ED complaining of general weakness. Patient reports onset of back pain, chills, and nausea/vomiting x 4 days on 12/4. On Thurs 12/5, she went to urgent care and was found to have a UTI. She was prescribed macrobid, which she has been taking up until today. Patient has continued to feel poorly with decreased po intake and overall weakness. Yesterday 12/6, patient had a fall while walking between rooms with her  due to weakness. She hit her head on the wall and fell to the floor, but did not have LOC. Patient denies dysuria, hematuria, abdominal pain. Patient endorses chronic cough with sputum production that has recently changed color to dark/greenish. She has not had any difficulty breathing until arriving to the ED. Denies any recent sick contacts. Patient is also complaining of back pain that is worse with movement but not necessarily associated with breathing/inspiration.     On arrival to the ED, patient was febrile with T 102, HR 87, RR 20, /76, spO2 94% on RA. Lactate WNL. UA had 2+ leukocytes, negative nitrites, 2 RBC, 1 WBC, and rare bacteria. CXR showed right lung base focal opacity. CTH was negative for hemorrhage. CT abdo without any acute processes, no hydronephrosis or  nephrolithiasis. She was given tylenol, 1 x dose vanc/zosyn/azithro, and ~2L IVF NS in the ED.    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, with long-term current use of insulin 2/22/2018    COPD (chronic obstructive pulmonary disease)     COPD exacerbation 4/8/2018    Defibrillator discharge     Diabetes mellitus     Diabetes mellitus type II     Diabetes with neurologic complications     Goiter     MNG    HX: breast cancer     Hyperlipidemia     Hypertension     Iron deficiency anemia 5/16/2017    Left kidney mass     Meningioma     Osteoporosis, postmenopausal     Pacemaker     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    Skin cancer     s/p excision    Sleep apnea     CPAP    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/2015    mid forehead    Unspecified vitamin D deficiency     Ventricular tachycardia     Vitamin B12 deficiency     Vitamin D deficiency disease        Past Surgical History:   Procedure Laterality Date    BASAL CELL CARCINOMA EXCISION      posterior neck and nose    BREAST BIOPSY      BREAST CYST EXCISION Left     BREAST SURGERY      CARDIAC DEFIBRILLATOR PLACEMENT      x 2    CATARACT EXTRACTION W/  INTRAOCULAR LENS IMPLANT Bilateral     CHOLECYSTECTOMY      COLONOSCOPY N/A 11/5/2019    Procedure: COLONOSCOPY;  Surgeon: Boaz Botello MD;  Location: [facility] ([address] [place] [address]);  Service: Endoscopy;  Laterality: N/A;   AICD - [employer] -     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    FRACTURE SURGERY      left elbow and wrist as a child    HYSTERECTOMY      MASTECTOMY Right     REPLACEMENT OF IMPLANTABLE CARDIOVERTER-DEFIBRILLATOR (ICD) GENERATOR N/A 12/17/2018    Procedure: REPLACEMENT, ICD GENERATOR;  Surgeon: Jan Mckeon MD;  Location: [facility] EP LAB;  Service: Cardiology;  Laterality: N/A;  DONNA, CRT-D gen change, MDT, MAC, SK, 3 Prep    REVISION OF SKIN POCKET FOR CARDIOVERTER-DEFIBRILLATOR  12/17/2018    Procedure: Revision, Skin Pocket, For Cardioverter-Defibrillator;  Surgeon: Jan Mckeon MD;  Location: [facility] EP [facility];  Service: Cardiology;;    SQUAMOUS CELL CARCINOMA EXCISION      remved from forehead    TONSILLECTOMY         Review of patient's allergies indicates:   Allergen Reactions    Iodine and iodide containing products Hives    Nifedipine      weakness       Current Facility-Administered Medications on File Prior to Encounter   Medication    0.9%  NaCl infusion     Current Outpatient Medications on File Prior to Encounter   Medication Sig    aspirin (ENTERIC COATED ASPIRIN) 81 MG EC tablet Take 1 tablet by mouth once daily.     carvedilol (COREG) 25 MG tablet TAKE 2 TABLETS (50 MG TOTAL) BY MOUTH 2 (TWO) TIMES DAILY.    chlorthalidone (HYGROTEN) 25 MG Tab Take 1 tablet (25 mg total) by mouth once daily.    cholecalciferol, vitamin D3, (VITAMIN D3) 2,000 unit Tab Take by mouth once daily.    cloNIDine 0.1 mg/24 hr td ptwk (CATAPRES) 0.1 mg/24 hr Place 1 patch onto the skin every 7 days.    CYANOCOBALAMIN, VITAMIN B-12, (B-12 DOTS ORAL) Take 1 tablet by mouth once daily.    diltiaZEM (CARDIZEM CD) 120 MG Cp24 Take 1 capsule (120 mg total) by mouth once daily.    fluticasone (FLONASE) 50 mcg/actuation nasal spray 2 sprays (100 mcg total) by Each Nare route once daily.    fluticasone-salmeterol diskus inhaler 250-50 mcg Inhale 1 puff into the lungs 2 (two) times daily. This is a change from one  "month    hydrALAZINE (APRESOLINE) 100 MG tablet TAKE 1 TABLET (100 MG TOTAL) BY MOUTH 3 (THREE) TIMES DAILY.    insulin (LANTUS SOLOSTAR U-100 INSULIN) glargine 100 units/mL (3mL) SubQ pen Inject 34 Units into the skin every evening.    irbesartan (AVAPRO) 300 MG tablet Take 1 tablet (300 mg total) by mouth every evening.    linaGLIPtin (TRADJENTA) 5 mg Tab tablet Take 1 tablet (5 mg total) by mouth once daily.    magnesium oxide (MAG-OX) 400 mg tablet Take 1 tablet (400 mg total) by mouth once daily.    metFORMIN (GLUCOPHAGE) 500 MG tablet Take 1 tablet (500 mg total) by mouth 2 (two) times daily with meals.    omega-3 fatty acids (FISH OIL) 500 mg Cap Take 1 capsule by mouth Twice daily.    pravastatin (PRAVACHOL) 40 MG tablet Take 1 tablet (40 mg total) by mouth once daily.    VENTOLIN HFA 90 mcg/actuation inhaler INHALE 2 PUFFS INTO THE LUNGS EVERY 6 (SIX) HOURS AS NEEDED FOR WHEEZING. RESCUE    ACCU-CHEK HECTOR CONTROL SOLN Soln     ACCU-CHEK HECTOR PLUS METER [facility]     azelastine (ASTELIN) 137 mcg (0.1 %) nasal spray 1 spray (137 mcg total) by Nasal route 2 (two) times daily. For severe allergy    benzonatate (TESSALON PERLES) 100 MG capsule Take 2 capsules (200 mg total) by mouth 3 (three) times daily as needed for Cough.    blood sugar diagnostic (ACCU-CHEK HECTOR) Strp Uses Accu-Check Hector meter to test BG 4x/day    lancets (ACCU-CHEK SOFTCLIX LANCETS) Misc Uses Accu-Chek Hector meter to test BG 4x/day    lancets 30 gauge Misc     lancing device Misc     levocetirizine (XYZAL) 5 MG tablet Take 1 tablet (5 mg total) by mouth every evening.    nitroGLYCERIN (NITROSTAT) 0.4 MG SL tablet Place 1 tablet under the tongue continuous prn.      ondansetron (ZOFRAN-ODT) 4 MG TbDL Take 1 tablet (4 mg total) by mouth every 6 (six) hours as needed.    pen needle, diabetic (BD ULTRA-FINE MINI PEN NEEDLE) 31 gauge x 3/16" Ndle USE WITH LANTUS AT BEDTIME    [DISCONTINUED] GAVILYTE-AYAH 236-22.74-6.74 -5.86 " gram suspension     [DISCONTINUED] nitrofurantoin, macrocrystal-monohydrate, (MACROBID) 100 MG capsule Take 1 capsule (100 mg total) by mouth 2 (two) times daily. for 5 days     Family History     Problem Relation (Age of Onset)    Asthma Mother    Cancer Brother, Son, Daughter    Diabetes Father, Sister, Brother, Brother, Brother, Brother, Son, Daughter, Son    Heart disease Father, Sister, Brother, Brother, Brother    Hypertension Brother, Brother, Mother    Melanoma Daughter    No Known Problems Maternal Grandmother, Maternal Grandfather, Paternal Grandmother, Paternal Grandfather    Obesity Daughter    Stroke Mother        Tobacco Use    Smoking status: Never Smoker    Smokeless tobacco: Never Used   Substance and Sexual Activity    Alcohol use: No     Alcohol/week: 0.0 standard drinks    Drug use: No    Sexual activity: Yes     Partners: Male     Review of Systems   Constitutional: Positive for activity change, appetite change (decreased), chills, fatigue and fever.   HENT: Negative for congestion, rhinorrhea, sore throat and trouble swallowing.    Respiratory: Positive for cough (productive dark/green sputum) and shortness of breath.    Cardiovascular: Negative for chest pain, palpitations and leg swelling.   Gastrointestinal: Positive for diarrhea, nausea and vomiting. Negative for abdominal pain and constipation.   Genitourinary: Negative for difficulty urinating, dysuria and hematuria.   Musculoskeletal: Positive for back pain. Negative for arthralgias.   Skin: Negative for color change and pallor.   Neurological: Positive for weakness. Negative for dizziness and headaches.     Objective:     Vital Signs (Most Recent):  Temp: 98.2 °F (36.8 °C) (12/07/19 2103)  Pulse: 83 (12/07/19 2103)  Resp: 18 (12/07/19 2103)  BP: 130/63 (12/07/19 2103)  SpO2: 95 % (12/07/19 2103) Vital Signs (24h Range):  Temp:  [98.1 °F (36.7 °C)-102 °F (38.9 °C)] 98.2 °F (36.8 °C)  Pulse:  [77-94] 83  Resp:  [16-25] 18  SpO2:   [92 %-97 %] 95 %  BP: (106-197)/(57-76) 130/63     Weight: 71.5 kg (157 lb 10.1 oz)  Body mass index is 27.92 kg/m².    Physical Exam   Constitutional: She is oriented to person, place, and time. She appears well-developed.   Ill-appearing elderly lady   HENT:   Head: Normocephalic.   Mouth/Throat: Oropharynx is clear and moist.   Mild edema/tenderness to R parietal head   Eyes: Pupils are equal, round, and reactive to light. Conjunctivae and EOM are normal. No scleral icterus.   Neck: Normal range of motion. Neck supple. No tracheal deviation present.   Cardiovascular: Normal rate, regular rhythm, normal heart sounds and intact distal pulses.   No murmur heard.  Pulmonary/Chest: Effort normal. No respiratory distress. She has wheezes. She has no rales.   On 2L NC  Decreased breath sounds R lung base   Abdominal: Soft. Bowel sounds are normal. She exhibits no distension. There is no tenderness. There is no guarding and no CVA tenderness.   Musculoskeletal: She exhibits no edema, tenderness or deformity.   Neurological: She is alert and oriented to person, place, and time.   Skin: Skin is warm and dry. She is not diaphoretic.          Significant Labs:   Blood Culture:   Recent Labs   Lab 12/07/19  1025 12/07/19  1030   LABBLOO No Growth to date No Growth to date     CBC:   Recent Labs   Lab 12/07/19  1039   WBC 14.81*   HGB 9.8*   HCT 31.1*        CMP:   Recent Labs   Lab 12/07/19  1039   *   K 3.2*   CL 95   CO2 24   GLU 61*   BUN 50*   CREATININE 2.2*   CALCIUM 9.3   PROT 6.8   ALBUMIN 2.5*   BILITOT 0.6   ALKPHOS 99   AST 21   ALT 15   ANIONGAP 13   EGFRNONAA 20.6*     Lactic Acid:   Recent Labs   Lab 12/07/19  1039 12/07/19  1403   LACTATE 0.9 0.6     Respiratory Culture:   Recent Labs   Lab 12/07/19  1520   GSRESP <10 epithelial cells per low power field.  Few WBC's  Many Gram positive cocci  Rare Gram negative rods     Urine Studies:   Recent Labs   Lab 12/07/19  1230   COLORU Yellow    APPEARANCEUA Hazy*   PHUR 5.0   SPECGRAV 1.010   PROTEINUA 2+*   GLUCUA Negative   KETONESU Negative   BILIRUBINUA Negative   OCCULTUA Negative   NITRITE Negative   LEUKOCYTESUR 2+*   RBCUA 2   WBCUA 1   BACTERIA Rare   SQUAMEPITHEL 3   HYALINECASTS 0     All pertinent labs within the past 24 hours have been reviewed.    Significant Imaging: I have reviewed all pertinent imaging results/findings within the past 24 hours.    Assessment/Plan:     * Sepsis  80yo F presenting with general weakness, cough with increased sputum production of greenish color, fevers and leukocytosis recently seen in urgent care for UTI and prescribed macrobid.   - CXR with R lung base focal opacity concerning for PNA.   - UA with 2+ prot, 2+ leuk, negative nitrites, 2 RBC, 1 WBC, rare bacteria  - flu A&B negative    - sepsis likely 2/2 PNA vs UTI  - follow-up blood Cx  - follow-up urine Cx  - respiratory infection panel, respiratory cx  - start abx with azithro/ceftriaxone for empiric coverage of CAP and UTI    VIRGILIO (acute kidney injury)  Patient with hx of CKD III 2/2 DM. Baseline Cr ~ 1.2-1.4  - s/p IVF resuscitation 2L NS  - daily CMP  - monitor Cr, may require more fluids    Hypomagnesemia  - continue home mag ox supplement      Type 2 diabetes mellitus with stage 3 chronic kidney disease, with long-term current use of insulin  Last HbA1c (9/17/19): 8.4%  Taking lantus 34U nightly at home  - hold basal insulin as patient was hypoglycemic to 61 on admission  - LDSSI  - glucose QID  - diabetic diet  - resume insulin as appropriate      Essential hypertension  Home meds include: carvedilol 50 BID, irbesartan 300mg, diltiazem 120mg,chlorthalidone 25mg, hydralazine 100mg TID, clonidine patch 0.1mg    - held BP meds in the setting of sepsis  - resume as clinically appropriate  - resume hydralazine and clonidine patch    Coronary artery disease involving native coronary artery without angina pectoris - Non-obstructive 50% LAD  Hx CAD and CHF  s/p ICD  - continue home ASA    Asthma, chronic  - duonebs q4h scheduled  - breo daily  - albuterol prn  - chest PT BID        VTE Risk Mitigation (From admission, onward)         Ordered     IP VTE HIGH RISK PATIENT  Once      12/07/19 4425     enoxaparin injection 30 mg  Daily      12/07/19 8264                   Eufemia Cortes MD  Department of Hospital Medicine   [facility]

## 2019-12-08 NOTE — PLAN OF CARE
Blane complete. Pt tolerated session well. D/C from OT.  Discharge Recommendation: Home Health  DME rec: None    Problem: Occupational Therapy Goal  Goal: Occupational Therapy Goal  Outcome: Met

## 2019-12-08 NOTE — ASSESSMENT & PLAN NOTE
Last HbA1c (9/17/19): 8.4%  Taking lantus 34U nightly at home  - hold basal insulin as patient was hypoglycemic to 61 on admission  - LDSSI  - glucose QID  - diabetic diet  - resume insulin as appropriate

## 2019-12-08 NOTE — PLAN OF CARE
Recommendations    Recommendation/Intervention:   1. Continue current diet order as tolerated.    -If PO intake <50%, recommend Boost Glucose TID. Will monitor.   Goals: Pt to meet % EEN and EPN by RD follow-up.

## 2019-12-08 NOTE — SUBJECTIVE & OBJECTIVE
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, with long-term current use of insulin 2/22/2018    COPD (chronic obstructive pulmonary disease)     COPD exacerbation 4/8/2018    Defibrillator discharge     Diabetes mellitus     Diabetes mellitus type II     Diabetes with neurologic complications     Goiter     MNG    HX: breast cancer     Hyperlipidemia     Hypertension     Iron deficiency anemia 5/16/2017    Left kidney mass     Meningioma     Osteoporosis, postmenopausal     Pacemaker     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    Skin cancer     s/p excision    Sleep apnea     CPAP    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/2015    mid forehead    Unspecified vitamin D deficiency     Ventricular tachycardia     Vitamin B12 deficiency     Vitamin D deficiency disease        Past Surgical History:   Procedure Laterality Date    BASAL CELL CARCINOMA EXCISION      posterior neck and nose    BREAST BIOPSY      BREAST CYST EXCISION Left     BREAST SURGERY      CARDIAC DEFIBRILLATOR PLACEMENT      x 2    CATARACT EXTRACTION W/  INTRAOCULAR LENS IMPLANT Bilateral     CHOLECYSTECTOMY      COLONOSCOPY N/A 11/5/2019    Procedure: COLONOSCOPY;  Surgeon: Boaz Botello MD;  Location: [facility] ([address] [place] [address]);  Service: Endoscopy;  Laterality: N/A;  [facility] -     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    FRACTURE SURGERY      left  elbow and wrist as a child    HYSTERECTOMY      MASTECTOMY Right     REPLACEMENT OF IMPLANTABLE CARDIOVERTER-DEFIBRILLATOR (ICD) GENERATOR N/A 12/17/2018    Procedure: REPLACEMENT, ICD GENERATOR;  Surgeon: Jan Mckeon MD;  Location: [facility] EP LAB;  Service: Cardiology;  Laterality: N/A;  DONNA, CRT-D gen change, MDT, MAC, SK, 3 Prep    REVISION OF SKIN POCKET FOR CARDIOVERTER-DEFIBRILLATOR  12/17/2018    Procedure: Revision, Skin Pocket, For Cardioverter-Defibrillator;  Surgeon: Jan Mckeon MD;  Location: [facility] EP LAB;  Service: Cardiology;;    SQUAMOUS CELL CARCINOMA EXCISION      remved from forehead    TONSILLECTOMY         Review of patient's allergies indicates:   Allergen Reactions    Iodine and iodide containing products Hives    Nifedipine      weakness       Current Facility-Administered Medications on File Prior to Encounter   Medication    0.9%  NaCl infusion     Current Outpatient Medications on File Prior to Encounter   Medication Sig    aspirin (ENTERIC COATED ASPIRIN) 81 MG EC tablet Take 1 tablet by mouth once daily.     carvedilol (COREG) 25 MG tablet TAKE 2 TABLETS (50 MG TOTAL) BY MOUTH 2 (TWO) TIMES DAILY.    chlorthalidone (HYGROTEN) 25 MG Tab Take 1 tablet (25 mg total) by mouth once daily.    cholecalciferol, vitamin D3, (VITAMIN D3) 2,000 unit Tab Take by mouth once daily.    cloNIDine 0.1 mg/24 hr td ptwk (CATAPRES) 0.1 mg/24 hr Place 1 patch onto the skin every 7 days.    CYANOCOBALAMIN, VITAMIN B-12, (B-12 DOTS ORAL) Take 1 tablet by mouth once daily.    diltiaZEM (CARDIZEM CD) 120 MG Cp24 Take 1 capsule (120 mg total) by mouth once daily.    fluticasone (FLONASE) 50 mcg/actuation nasal spray 2 sprays (100 mcg total) by Each Nare route once daily.    fluticasone-salmeterol diskus inhaler 250-50 mcg Inhale 1 puff into the lungs 2 (two) times daily. This is a change from one month    hydrALAZINE (APRESOLINE) 100 MG tablet TAKE 1 TABLET (100 MG TOTAL) BY MOUTH 3 (THREE)  "TIMES DAILY.    insulin (LANTUS SOLOSTAR U-100 INSULIN) glargine 100 units/mL (3mL) SubQ pen Inject 34 Units into the skin every evening.    irbesartan (AVAPRO) 300 MG tablet Take 1 tablet (300 mg total) by mouth every evening.    linaGLIPtin (TRADJENTA) 5 mg Tab tablet Take 1 tablet (5 mg total) by mouth once daily.    magnesium oxide (MAG-OX) 400 mg tablet Take 1 tablet (400 mg total) by mouth once daily.    metFORMIN (GLUCOPHAGE) 500 MG tablet Take 1 tablet (500 mg total) by mouth 2 (two) times daily with meals.    omega-3 fatty acids (FISH OIL) 500 mg Cap Take 1 capsule by mouth Twice daily.    pravastatin (PRAVACHOL) 40 MG tablet Take 1 tablet (40 mg total) by mouth once daily.    VENTOLIN HFA 90 mcg/actuation inhaler INHALE 2 PUFFS INTO THE LUNGS EVERY 6 (SIX) HOURS AS NEEDED FOR WHEEZING. RESCUE    ACCU-CHEK HECTOR CONTROL SOLN Soln     ACCU-CHEK HECTOR PLUS METER Mis     azelastine (ASTELIN) 137 mcg (0.1 %) nasal spray 1 spray (137 mcg total) by Nasal route 2 (two) times daily. For severe allergy    benzonatate (TESSALON PERLES) 100 MG capsule Take 2 capsules (200 mg total) by mouth 3 (three) times daily as needed for Cough.    blood sugar diagnostic (ACCU-CHEK HECTOR) Strp Uses Accu-Check Hector meter to test BG 4x/day    lancets (ACCU-CHEK SOFTCLIX LANCETS) Misc Uses Accu-Chek Hector meter to test BG 4x/day    lancets 30 gauge Misc     lancing device Misc     levocetirizine (XYZAL) 5 MG tablet Take 1 tablet (5 mg total) by mouth every evening.    nitroGLYCERIN (NITROSTAT) 0.4 MG SL tablet Place 1 tablet under the tongue continuous prn.      ondansetron (ZOFRAN-ODT) 4 MG TbDL Take 1 tablet (4 mg total) by mouth every 6 (six) hours as needed.    pen needle, diabetic (BD ULTRA-FINE MINI PEN NEEDLE) 31 gauge x 3/16" Ndle USE WITH LANTUS AT BEDTIME    [DISCONTINUED] GAVILYTE-G 236-22.74-6.74 -5.86 gram suspension     [DISCONTINUED] nitrofurantoin, macrocrystal-monohydrate, (MACROBID) 100 MG " capsule Take 1 capsule (100 mg total) by mouth 2 (two) times daily. for 5 days     Family History     Problem Relation (Age of Onset)    Asthma Mother    Cancer Brother, Son, Daughter    Diabetes Father, Sister, Brother, Brother, Brother, Brother, Son, Daughter, Son    Heart disease Father, Sister, Brother, Brother, Brother    Hypertension Brother, Brother, Mother    Melanoma Daughter    No Known Problems Maternal Grandmother, Maternal Grandfather, Paternal Grandmother, Paternal Grandfather    Obesity Daughter    Stroke Mother        Tobacco Use    Smoking status: Never Smoker    Smokeless tobacco: Never Used   Substance and Sexual Activity    Alcohol use: No     Alcohol/week: 0.0 standard drinks    Drug use: No    Sexual activity: Yes     Partners: Male     Review of Systems   Constitutional: Positive for activity change, appetite change (decreased), chills, fatigue and fever.   HENT: Negative for congestion, rhinorrhea, sore throat and trouble swallowing.    Respiratory: Positive for cough (productive dark/green sputum) and shortness of breath.    Cardiovascular: Negative for chest pain, palpitations and leg swelling.   Gastrointestinal: Positive for diarrhea, nausea and vomiting. Negative for abdominal pain and constipation.   Genitourinary: Negative for difficulty urinating, dysuria and hematuria.   Musculoskeletal: Positive for back pain. Negative for arthralgias.   Skin: Negative for color change and pallor.   Neurological: Positive for weakness. Negative for dizziness and headaches.     Objective:     Vital Signs (Most Recent):  Temp: 98.2 °F (36.8 °C) (12/07/19 2103)  Pulse: 83 (12/07/19 2103)  Resp: 18 (12/07/19 2103)  BP: 130/63 (12/07/19 2103)  SpO2: 95 % (12/07/19 2103) Vital Signs (24h Range):  Temp:  [98.1 °F (36.7 °C)-102 °F (38.9 °C)] 98.2 °F (36.8 °C)  Pulse:  [77-94] 83  Resp:  [16-25] 18  SpO2:  [92 %-97 %] 95 %  BP: (106-197)/(57-76) 130/63     Weight: 71.5 kg (157 lb 10.1 oz)  Body mass  index is 27.92 kg/m².    Physical Exam   Constitutional: She is oriented to person, place, and time. She appears well-developed.   Ill-appearing elderly lady   HENT:   Head: Normocephalic.   Mouth/Throat: Oropharynx is clear and moist.   Mild edema/tenderness to R parietal head   Eyes: Pupils are equal, round, and reactive to light. Conjunctivae and EOM are normal. No scleral icterus.   Neck: Normal range of motion. Neck supple. No tracheal deviation present.   Cardiovascular: Normal rate, regular rhythm, normal heart sounds and intact distal pulses.   No murmur heard.  Pulmonary/Chest: Effort normal. No respiratory distress. She has wheezes. She has no rales.   On 2L NC  Decreased breath sounds R lung base   Abdominal: Soft. Bowel sounds are normal. She exhibits no distension. There is no tenderness. There is no guarding and no CVA tenderness.   Musculoskeletal: She exhibits no edema, tenderness or deformity.   Neurological: She is alert and oriented to person, place, and time.   Skin: Skin is warm and dry. She is not diaphoretic.          Significant Labs:   Blood Culture:   Recent Labs   Lab 12/07/19  1025 12/07/19  1030   LABBLOO No Growth to date No Growth to date     CBC:   Recent Labs   Lab 12/07/19  1039   WBC 14.81*   HGB 9.8*   HCT 31.1*        CMP:   Recent Labs   Lab 12/07/19  1039   *   K 3.2*   CL 95   CO2 24   GLU 61*   BUN 50*   CREATININE 2.2*   CALCIUM 9.3   PROT 6.8   ALBUMIN 2.5*   BILITOT 0.6   ALKPHOS 99   AST 21   ALT 15   ANIONGAP 13   EGFRNONAA 20.6*     Lactic Acid:   Recent Labs   Lab 12/07/19  1039 12/07/19  1403   LACTATE 0.9 0.6     Respiratory Culture:   Recent Labs   Lab 12/07/19  1520   GSRESP <10 epithelial cells per low power field.  Few WBC's  Many Gram positive cocci  Rare Gram negative rods     Urine Studies:   Recent Labs   Lab 12/07/19  1230   COLORU Yellow   APPEARANCEUA Hazy*   PHUR 5.0   SPECGRAV 1.010   PROTEINUA 2+*   GLUCUA Negative   KETONESU Negative    BILIRUBINUA Negative   OCCULTUA Negative   NITRITE Negative   LEUKOCYTESUR 2+*   RBCUA 2   WBCUA 1   BACTERIA Rare   SQUAMEPITHEL 3   HYALINECASTS 0     All pertinent labs within the past 24 hours have been reviewed.    Significant Imaging: I have reviewed all pertinent imaging results/findings within the past 24 hours.

## 2019-12-09 PROBLEM — J15.1 PSEUDOMONAS PNEUMONIA: Status: ACTIVE | Noted: 2019-12-07

## 2019-12-09 PROBLEM — M54.6 ACUTE RIGHT-SIDED THORACIC BACK PAIN: Status: ACTIVE | Noted: 2019-12-09

## 2019-12-09 LAB
ANION GAP SERPL CALC-SCNC: 10 MMOL/L (ref 8–16)
BASOPHILS # BLD AUTO: 0.08 K/UL (ref 0–0.2)
BASOPHILS NFR BLD: 0.7 % (ref 0–1.9)
BUN SERPL-MCNC: 37 MG/DL (ref 8–23)
CALCIUM SERPL-MCNC: 8.9 MG/DL (ref 8.7–10.5)
CHLORIDE SERPL-SCNC: 100 MMOL/L (ref 95–110)
CO2 SERPL-SCNC: 25 MMOL/L (ref 23–29)
CREAT SERPL-MCNC: 1.5 MG/DL (ref 0.5–1.4)
DIFFERENTIAL METHOD: ABNORMAL
EOSINOPHIL # BLD AUTO: 0 K/UL (ref 0–0.5)
EOSINOPHIL NFR BLD: 0.3 % (ref 0–8)
ERYTHROCYTE [DISTWIDTH] IN BLOOD BY AUTOMATED COUNT: 12.7 % (ref 11.5–14.5)
EST. GFR  (AFRICAN AMERICAN): 37.7 ML/MIN/1.73 M^2
EST. GFR  (NON AFRICAN AMERICAN): 32.7 ML/MIN/1.73 M^2
GLUCOSE SERPL-MCNC: 227 MG/DL (ref 70–110)
HCT VFR BLD AUTO: 30.1 % (ref 37–48.5)
HGB BLD-MCNC: 9.3 G/DL (ref 12–16)
IMM GRANULOCYTES # BLD AUTO: 0.22 K/UL (ref 0–0.04)
IMM GRANULOCYTES NFR BLD AUTO: 2 % (ref 0–0.5)
LYMPHOCYTES # BLD AUTO: 1 K/UL (ref 1–4.8)
LYMPHOCYTES NFR BLD: 8.9 % (ref 18–48)
MAGNESIUM SERPL-MCNC: 1.8 MG/DL (ref 1.6–2.6)
MCH RBC QN AUTO: 29.4 PG (ref 27–31)
MCHC RBC AUTO-ENTMCNC: 30.9 G/DL (ref 32–36)
MCV RBC AUTO: 95 FL (ref 82–98)
MONOCYTES # BLD AUTO: 0.8 K/UL (ref 0.3–1)
MONOCYTES NFR BLD: 7.5 % (ref 4–15)
NEUTROPHILS # BLD AUTO: 9.1 K/UL (ref 1.8–7.7)
NEUTROPHILS NFR BLD: 80.6 % (ref 38–73)
NRBC BLD-RTO: 0 /100 WBC
PHOSPHATE SERPL-MCNC: 2.2 MG/DL (ref 2.7–4.5)
PLATELET # BLD AUTO: 245 K/UL (ref 150–350)
PMV BLD AUTO: 11.9 FL (ref 9.2–12.9)
POCT GLUCOSE: 220 MG/DL (ref 70–110)
POCT GLUCOSE: 261 MG/DL (ref 70–110)
POCT GLUCOSE: 283 MG/DL (ref 70–110)
POCT GLUCOSE: 297 MG/DL (ref 70–110)
POTASSIUM SERPL-SCNC: 3.5 MMOL/L (ref 3.5–5.1)
RBC # BLD AUTO: 3.16 M/UL (ref 4–5.4)
SODIUM SERPL-SCNC: 135 MMOL/L (ref 136–145)
WBC # BLD AUTO: 11.26 K/UL (ref 3.9–12.7)

## 2019-12-09 PROCEDURE — 25000242 PHARM REV CODE 250 ALT 637 W/ HCPCS: Mod: HCNC | Performed by: STUDENT IN AN ORGANIZED HEALTH CARE EDUCATION/TRAINING PROGRAM

## 2019-12-09 PROCEDURE — 99232 SBSQ HOSP IP/OBS MODERATE 35: CPT | Mod: HCNC,GC,, | Performed by: HOSPITALIST

## 2019-12-09 PROCEDURE — C9399 UNCLASSIFIED DRUGS OR BIOLOG: HCPCS | Mod: HCNC | Performed by: STUDENT IN AN ORGANIZED HEALTH CARE EDUCATION/TRAINING PROGRAM

## 2019-12-09 PROCEDURE — 63600175 PHARM REV CODE 636 W HCPCS: Mod: HCNC | Performed by: STUDENT IN AN ORGANIZED HEALTH CARE EDUCATION/TRAINING PROGRAM

## 2019-12-09 PROCEDURE — 94761 N-INVAS EAR/PLS OXIMETRY MLT: CPT | Mod: HCNC

## 2019-12-09 PROCEDURE — 94660 CPAP INITIATION&MGMT: CPT | Mod: HCNC

## 2019-12-09 PROCEDURE — 85025 COMPLETE CBC W/AUTO DIFF WBC: CPT | Mod: HCNC

## 2019-12-09 PROCEDURE — 27000221 HC OXYGEN, UP TO 24 HOURS: Mod: HCNC

## 2019-12-09 PROCEDURE — 80048 BASIC METABOLIC PNL TOTAL CA: CPT | Mod: HCNC

## 2019-12-09 PROCEDURE — 25000003 PHARM REV CODE 250: Mod: HCNC | Performed by: STUDENT IN AN ORGANIZED HEALTH CARE EDUCATION/TRAINING PROGRAM

## 2019-12-09 PROCEDURE — 36415 COLL VENOUS BLD VENIPUNCTURE: CPT | Mod: HCNC

## 2019-12-09 PROCEDURE — 11000001 HC ACUTE MED/SURG PRIVATE ROOM: Mod: HCNC

## 2019-12-09 PROCEDURE — 84100 ASSAY OF PHOSPHORUS: CPT | Mod: HCNC

## 2019-12-09 PROCEDURE — 99900035 HC TECH TIME PER 15 MIN (STAT): Mod: HCNC

## 2019-12-09 PROCEDURE — 83735 ASSAY OF MAGNESIUM: CPT | Mod: HCNC

## 2019-12-09 PROCEDURE — 94640 AIRWAY INHALATION TREATMENT: CPT | Mod: HCNC

## 2019-12-09 PROCEDURE — 99232 PR SUBSEQUENT HOSPITAL CARE,LEVL II: ICD-10-PCS | Mod: HCNC,GC,, | Performed by: HOSPITALIST

## 2019-12-09 RX ORDER — CARVEDILOL 25 MG/1
25 TABLET ORAL 2 TIMES DAILY WITH MEALS
Status: DISCONTINUED | OUTPATIENT
Start: 2019-12-09 | End: 2019-12-10 | Stop reason: HOSPADM

## 2019-12-09 RX ORDER — TALC
6 POWDER (GRAM) TOPICAL NIGHTLY PRN
Status: DISCONTINUED | OUTPATIENT
Start: 2019-12-09 | End: 2019-12-10 | Stop reason: HOSPADM

## 2019-12-09 RX ORDER — AZITHROMYCIN 250 MG/1
500 TABLET, FILM COATED ORAL DAILY
Status: CANCELLED | OUTPATIENT
Start: 2019-12-10 | End: 2019-12-12

## 2019-12-09 RX ORDER — INSULIN ASPART 100 [IU]/ML
3 INJECTION, SOLUTION INTRAVENOUS; SUBCUTANEOUS
Status: DISCONTINUED | OUTPATIENT
Start: 2019-12-09 | End: 2019-12-10 | Stop reason: HOSPADM

## 2019-12-09 RX ORDER — CEFTRIAXONE 1 G/1
1 INJECTION, POWDER, FOR SOLUTION INTRAMUSCULAR; INTRAVENOUS
Status: CANCELLED | OUTPATIENT
Start: 2019-12-10 | End: 2019-12-12

## 2019-12-09 RX ORDER — TRAMADOL HYDROCHLORIDE 50 MG/1
50 TABLET ORAL ONCE
Status: COMPLETED | OUTPATIENT
Start: 2019-12-09 | End: 2019-12-09

## 2019-12-09 RX ORDER — DILTIAZEM HYDROCHLORIDE 120 MG/1
120 CAPSULE, COATED, EXTENDED RELEASE ORAL DAILY
Status: DISCONTINUED | OUTPATIENT
Start: 2019-12-09 | End: 2019-12-10 | Stop reason: HOSPADM

## 2019-12-09 RX ORDER — LEVOFLOXACIN 250 MG/1
750 TABLET ORAL EVERY OTHER DAY
Status: DISCONTINUED | OUTPATIENT
Start: 2019-12-09 | End: 2019-12-10 | Stop reason: HOSPADM

## 2019-12-09 RX ORDER — TRAMADOL HYDROCHLORIDE 50 MG/1
50 TABLET ORAL EVERY 8 HOURS PRN
Status: DISCONTINUED | OUTPATIENT
Start: 2019-12-09 | End: 2019-12-10 | Stop reason: HOSPADM

## 2019-12-09 RX ORDER — CYCLOBENZAPRINE HCL 5 MG
5 TABLET ORAL ONCE
Status: COMPLETED | OUTPATIENT
Start: 2019-12-09 | End: 2019-12-09

## 2019-12-09 RX ORDER — HYDROXYZINE PAMOATE 25 MG/1
25 CAPSULE ORAL EVERY 8 HOURS PRN
Status: DISCONTINUED | OUTPATIENT
Start: 2019-12-09 | End: 2019-12-10 | Stop reason: HOSPADM

## 2019-12-09 RX ADMIN — HYDRALAZINE HYDROCHLORIDE 100 MG: 50 TABLET, FILM COATED ORAL at 02:12

## 2019-12-09 RX ADMIN — HYDRALAZINE HYDROCHLORIDE 100 MG: 50 TABLET, FILM COATED ORAL at 09:12

## 2019-12-09 RX ADMIN — TRAMADOL HYDROCHLORIDE 50 MG: 50 TABLET, FILM COATED ORAL at 11:12

## 2019-12-09 RX ADMIN — CARVEDILOL 25 MG: 25 TABLET, FILM COATED ORAL at 05:12

## 2019-12-09 RX ADMIN — IPRATROPIUM BROMIDE AND ALBUTEROL SULFATE 3 ML: .5; 3 SOLUTION RESPIRATORY (INHALATION) at 03:12

## 2019-12-09 RX ADMIN — ENOXAPARIN SODIUM 30 MG: 100 INJECTION SUBCUTANEOUS at 05:12

## 2019-12-09 RX ADMIN — IPRATROPIUM BROMIDE AND ALBUTEROL SULFATE 3 ML: .5; 3 SOLUTION RESPIRATORY (INHALATION) at 11:12

## 2019-12-09 RX ADMIN — FLUTICASONE FUROATE AND VILANTEROL TRIFENATATE 1 PUFF: 100; 25 POWDER RESPIRATORY (INHALATION) at 09:12

## 2019-12-09 RX ADMIN — Medication 6 MG: at 09:12

## 2019-12-09 RX ADMIN — Medication 5 ML: at 06:12

## 2019-12-09 RX ADMIN — CYCLOBENZAPRINE HYDROCHLORIDE 5 MG: 5 TABLET, FILM COATED ORAL at 09:12

## 2019-12-09 RX ADMIN — INSULIN DETEMIR 10 UNITS: 100 INJECTION, SOLUTION SUBCUTANEOUS at 09:12

## 2019-12-09 RX ADMIN — LEVOFLOXACIN 750 MG: 250 TABLET, FILM COATED ORAL at 11:12

## 2019-12-09 RX ADMIN — ACETAMINOPHEN 650 MG: 325 TABLET ORAL at 05:12

## 2019-12-09 RX ADMIN — CEFTRIAXONE SODIUM 1 G: 1 INJECTION, POWDER, FOR SOLUTION INTRAMUSCULAR; INTRAVENOUS at 08:12

## 2019-12-09 RX ADMIN — HYDROXYZINE PAMOATE 25 MG: 25 CAPSULE ORAL at 06:12

## 2019-12-09 RX ADMIN — AZITHROMYCIN 500 MG: 250 TABLET, FILM COATED ORAL at 09:12

## 2019-12-09 RX ADMIN — Medication 5 ML: at 02:12

## 2019-12-09 RX ADMIN — CARVEDILOL 25 MG: 25 TABLET, FILM COATED ORAL at 09:12

## 2019-12-09 RX ADMIN — IPRATROPIUM BROMIDE AND ALBUTEROL SULFATE 3 ML: .5; 3 SOLUTION RESPIRATORY (INHALATION) at 12:12

## 2019-12-09 RX ADMIN — Medication 400 MG: at 09:12

## 2019-12-09 RX ADMIN — FLUTICASONE PROPIONATE 100 MCG: 50 SPRAY, METERED NASAL at 09:12

## 2019-12-09 RX ADMIN — INSULIN ASPART 3 UNITS: 100 INJECTION, SOLUTION INTRAVENOUS; SUBCUTANEOUS at 06:12

## 2019-12-09 RX ADMIN — PRAVASTATIN SODIUM 40 MG: 40 TABLET ORAL at 09:12

## 2019-12-09 RX ADMIN — INSULIN ASPART 1 UNITS: 100 INJECTION, SOLUTION INTRAVENOUS; SUBCUTANEOUS at 09:12

## 2019-12-09 RX ADMIN — LIDOCAINE 1 PATCH: 50 PATCH TOPICAL at 09:12

## 2019-12-09 RX ADMIN — IPRATROPIUM BROMIDE AND ALBUTEROL SULFATE 3 ML: .5; 3 SOLUTION RESPIRATORY (INHALATION) at 09:12

## 2019-12-09 RX ADMIN — INSULIN ASPART 3 UNITS: 100 INJECTION, SOLUTION INTRAVENOUS; SUBCUTANEOUS at 11:12

## 2019-12-09 RX ADMIN — DILTIAZEM HYDROCHLORIDE 120 MG: 120 CAPSULE, COATED, EXTENDED RELEASE ORAL at 01:12

## 2019-12-09 NOTE — HOSPITAL COURSE
78 y/o female with HTN, DM, CAD, CHF s/p ICD, asthma who presented with sepsis(fever 102, WBC 15) 2/2 pneumonia (CXR with R base opacity). Started on IV ceftriaxone/azithromycin. RIP negative. Resp cx grew out Pseudomonas - switched to PO levofloxacin. Home antihypertensives were restarted with the exception of chlorthalidone and irbesartan 2/2 VIRGILIO (Cr peaked at 2.2 and was down to baseline of 1.3 on day of discharge). Of note, she did have a fall prior to admission where she hit her back/head - TTP paraspinal area; pain improved over course of hospital stay with lidocaine patch/Flexeril/tramadol.    Discharged home in stable condition on 12/10/19 with home health PT/OT. Will follow-up with PCP for worsening/non-resolving back pain.

## 2019-12-09 NOTE — SUBJECTIVE & OBJECTIVE
"Interval History: Patient appears much improved, remains afebrile, WBC count trending down. Reports increased cough with red sputum production and continued back pain that "feels a little better". Denies SOB, still requiring 2L O2 NC. Tolerating po diet without complaints. Resp gram stain with many GPC and rare GNR. Resp culture growing GNR, pending identification and susceptibility. Continuing with IV ceftriaxone and azithromycin as patient responding well. Will follow-up on cultures.    Review of Systems   Constitutional: Positive for activity change. Negative for appetite change, chills, fatigue and fever.   HENT: Negative for congestion, rhinorrhea, sore throat and trouble swallowing.    Respiratory: Positive for cough (productive reddish sputum) and shortness of breath (improved).    Cardiovascular: Negative for chest pain, palpitations and leg swelling.   Gastrointestinal: Negative for abdominal pain, constipation, diarrhea, nausea and vomiting.   Genitourinary: Positive for dysuria. Negative for difficulty urinating and hematuria.   Musculoskeletal: Positive for back pain. Negative for arthralgias.   Skin: Negative for color change and pallor.   Neurological: Positive for weakness. Negative for dizziness and headaches.     Objective:     Vital Signs (Most Recent):  Temp: 96.6 °F (35.9 °C) (12/08/19 1644)  Pulse: 89 (12/08/19 1918)  Resp: 18 (12/08/19 1918)  BP: (!) 184/75 (12/08/19 1644)  SpO2: (!) 92 % (12/08/19 1918) Vital Signs (24h Range):  Temp:  [96.6 °F (35.9 °C)-98.8 °F (37.1 °C)] 96.6 °F (35.9 °C)  Pulse:  [77-91] 89  Resp:  [18-20] 18  SpO2:  [92 %-97 %] 92 %  BP: (111-184)/(54-75) 184/75     Weight: 71.5 kg (157 lb 10.1 oz)  Body mass index is 27.92 kg/m².    Intake/Output Summary (Last 24 hours) at 12/8/2019 1932  Last data filed at 12/8/2019 0400  Gross per 24 hour   Intake 540 ml   Output 300 ml   Net 240 ml      Physical Exam   Constitutional: She is oriented to person, place, and time. She " appears well-developed. No distress.   HENT:   Head: Normocephalic.   Mouth/Throat: Oropharynx is clear and moist.   Eyes: Pupils are equal, round, and reactive to light. Conjunctivae and EOM are normal. No scleral icterus.   Neck: Normal range of motion. Neck supple. No tracheal deviation present.   Cardiovascular: Normal rate, regular rhythm, normal heart sounds and intact distal pulses.   No murmur heard.  Pulmonary/Chest: Effort normal. No respiratory distress. She has no wheezes. She has no rales.   On 2L NC  Decreased breath sounds R lung base   Abdominal: Soft. Bowel sounds are normal. She exhibits no distension. There is no tenderness. There is no guarding and no CVA tenderness.   Musculoskeletal: She exhibits no edema, tenderness or deformity.   Neurological: She is alert and oriented to person, place, and time.   Skin: Skin is warm and dry. She is not diaphoretic.       Significant Labs:   Blood Culture:   Recent Labs   Lab 12/07/19  1025 12/07/19  1030   LABBLOO No Growth to date  No Growth to date No Growth to date  No Growth to date     CBC:   Recent Labs   Lab 12/07/19  1039 12/08/19  0402 12/08/19  1250   WBC 14.81* 14.76* 15.29*   HGB 9.8* 8.2* 9.8*   HCT 31.1* 25.7* 30.2*    160 212     CMP:   Recent Labs   Lab 12/07/19  1039 12/08/19  0402   * 133*   K 3.2* 3.6   CL 95 101   CO2 24 23   GLU 61* 65*   BUN 50* 41*   CREATININE 2.2* 1.6*   CALCIUM 9.3 7.9*   PROT 6.8 5.6*   ALBUMIN 2.5* 1.9*   BILITOT 0.6 0.4   ALKPHOS 99 89   AST 21 24   ALT 15 12   ANIONGAP 13 9   EGFRNONAA 20.6* 30.2*     All pertinent labs within the past 24 hours have been reviewed.    Significant Imaging: I have reviewed all pertinent imaging results/findings within the past 24 hours.

## 2019-12-09 NOTE — PLAN OF CARE
CM met with patient for discharge planning.  Patient states she lives with her spouse in a one story house with no steps to enter.  Patient is independent with ADL's.  Plan is to discharge home with family.    Pharmacy:    [facility]/pharmacy #[id number] - [place], [place] - [address] AT [facility]  [address] [address]  Phone: [phone] Fax: [phone]    PCP: Emelina Gaston MD    Payor: [employer] MEDICARE / Plan: [employer] / Product Type: Medicare Advantage /      12/09/19 1617   Discharge Assessment   Assessment Type Discharge Planning Assessment   Confirmed/corrected address and phone number on facesheet? Yes   Assessment information obtained from? Patient   Expected Length of Stay (days) 3   Communicated expected length of stay with patient/caregiver yes   Prior to hospitilization cognitive status: Alert/Oriented   Prior to hospitalization functional status: Independent   Current cognitive status: Alert/Oriented   Current Functional Status: Independent   Facility Arrived From: Emergency room   Lives With spouse   Able to Return to Prior Arrangements yes   Is patient able to care for self after discharge? Unable to determine at this time (comments)   Who are your caregiver(s) and their phone number(s)? Migue Quinones - spouse [phone]   Patient's perception of discharge disposition home or selfcare   Readmission Within the Last 30 Days no previous admission in last 30 days   Patient currently being followed by outpatient case management? No   Patient currently receives any other outside agency services? No   Equipment Currently Used at Home none   Do you have any problems affording any of your prescribed medications? No   Is the patient taking medications as prescribed? yes   Does the patient have transportation home? Yes   Transportation Anticipated family or friend will provide   Does the patient receive services at the [facility]? No    Discharge Plan A Home with family   Discharge Plan B Home Health   DME Needed Upon Discharge  none   Patient/Family in Agreement with Plan yes

## 2019-12-09 NOTE — PROGRESS NOTES
[facility] Hospital Medicine  Progress Note    Patient Name: Myesha Quinones  MRN: [id number]  Patient Class: IP- Inpatient   Admission Date: 12/7/2019  Length of Stay: 1 days  Attending Physician: Kelvin Yuong MD  Primary Care Provider: Emelina Gaston MD    [facility] Medicine Team: [facility]OSP MED 1 Eufemia Cortes MD    Subjective:     Principal Problem:Sepsis        HPI:  Ms. Myesha Quinones is an 80yo F with hx of HTN, DM, CAD and CHF s/p ICD, asthma, and remote hx of breast cancer as well as BCC who presents to the ED complaining of general weakness. Patient reports onset of back pain, chills, and nausea/vomiting x 4 days on 12/4. On Thurs 12/5, she went to urgent care and was found to have a UTI. She was prescribed macrobid, which she has been taking up until today. Patient has continued to feel poorly with decreased po intake and overall weakness. Yesterday 12/6, patient had a fall while walking between rooms with her  due to weakness. She hit her head on the wall and fell to the floor, but did not have LOC. Patient denies dysuria, hematuria, abdominal pain. Patient endorses chronic cough with sputum production that has recently changed color to dark/greenish. She has not had any difficulty breathing until arriving to the ED. Denies any recent sick contacts. Patient is also complaining of back pain that is worse with movement but not necessarily associated with breathing/inspiration.     On arrival to the ED, patient was febrile with T 102, HR 87, RR 20, /76, spO2 94% on RA. Lactate WNL. UA had 2+ leukocytes, negative nitrites, 2 RBC, 1 WBC, and rare bacteria. CXR showed right lung base focal opacity. CTH was negative for hemorrhage. CT abdo without any acute processes, no hydronephrosis or nephrolithiasis. She was given tylenol, 1 x dose vanc/zosyn/azithro, and ~2L IVF NS in the ED.    Overview/Hospital Course:  Patient admitted with sepsis 2/2 pneumonia. Started on IV  "ceftriaxone and azithromycin. BCx NGTD. RIP negative. Resp gram stain with many GPC and rare GNR. Resp culture growing GNR, pending identification and susceptibilities. Patient appears improved, afebrile, WBC count trending down on 12/8. Continuing with current abx as patient responding well.    Interval History: Patient appears much improved, remains afebrile, WBC count trending down. Reports increased cough with red sputum production and continued back pain that "feels a little better". Denies SOB, still requiring 2L O2 NC. Tolerating po diet without complaints. Resp gram stain with many GPC and rare GNR. Resp culture growing GNR, pending identification and susceptibility. Continuing with IV ceftriaxone and azithromycin as patient responding well. Will follow-up on cultures.    Review of Systems   Constitutional: Positive for activity change. Negative for appetite change, chills, fatigue and fever.   HENT: Negative for congestion, rhinorrhea, sore throat and trouble swallowing.    Respiratory: Positive for cough (productive reddish sputum) and shortness of breath (improved).    Cardiovascular: Negative for chest pain, palpitations and leg swelling.   Gastrointestinal: Negative for abdominal pain, constipation, diarrhea, nausea and vomiting.   Genitourinary: Positive for dysuria. Negative for difficulty urinating and hematuria.   Musculoskeletal: Positive for back pain. Negative for arthralgias.   Skin: Negative for color change and pallor.   Neurological: Positive for weakness. Negative for dizziness and headaches.     Objective:     Vital Signs (Most Recent):  Temp: 96.6 °F (35.9 °C) (12/08/19 1644)  Pulse: 89 (12/08/19 1918)  Resp: 18 (12/08/19 1918)  BP: (!) 184/75 (12/08/19 1644)  SpO2: (!) 92 % (12/08/19 1918) Vital Signs (24h Range):  Temp:  [96.6 °F (35.9 °C)-98.8 °F (37.1 °C)] 96.6 °F (35.9 °C)  Pulse:  [77-91] 89  Resp:  [18-20] 18  SpO2:  [92 %-97 %] 92 %  BP: (111-184)/(54-75) 184/75     Weight: 71.5 kg " (157 lb 10.1 oz)  Body mass index is 27.92 kg/m².    Intake/Output Summary (Last 24 hours) at 12/8/2019 1932  Last data filed at 12/8/2019 0400  Gross per 24 hour   Intake 540 ml   Output 300 ml   Net 240 ml      Physical Exam   Constitutional: She is oriented to person, place, and time. She appears well-developed. No distress.   HENT:   Head: Normocephalic.   Mouth/Throat: Oropharynx is clear and moist.   Eyes: Pupils are equal, round, and reactive to light. Conjunctivae and EOM are normal. No scleral icterus.   Neck: Normal range of motion. Neck supple. No tracheal deviation present.   Cardiovascular: Normal rate, regular rhythm, normal heart sounds and intact distal pulses.   No murmur heard.  Pulmonary/Chest: Effort normal. No respiratory distress. She has no wheezes. She has no rales.   On 2L NC  Decreased breath sounds R lung base   Abdominal: Soft. Bowel sounds are normal. She exhibits no distension. There is no tenderness. There is no guarding and no CVA tenderness.   Musculoskeletal: She exhibits no edema, tenderness or deformity.   Neurological: She is alert and oriented to person, place, and time.   Skin: Skin is warm and dry. She is not diaphoretic.       Significant Labs:   Blood Culture:   Recent Labs   Lab 12/07/19  1025 12/07/19  1030   LABBLOO No Growth to date  No Growth to date No Growth to date  No Growth to date     CBC:   Recent Labs   Lab 12/07/19  1039 12/08/19  0402 12/08/19  1250   WBC 14.81* 14.76* 15.29*   HGB 9.8* 8.2* 9.8*   HCT 31.1* 25.7* 30.2*    160 212     CMP:   Recent Labs   Lab 12/07/19  1039 12/08/19  0402   * 133*   K 3.2* 3.6   CL 95 101   CO2 24 23   GLU 61* 65*   BUN 50* 41*   CREATININE 2.2* 1.6*   CALCIUM 9.3 7.9*   PROT 6.8 5.6*   ALBUMIN 2.5* 1.9*   BILITOT 0.6 0.4   ALKPHOS 99 89   AST 21 24   ALT 15 12   ANIONGAP 13 9   EGFRNONAA 20.6* 30.2*     All pertinent labs within the past 24 hours have been reviewed.    Significant Imaging: I have reviewed all  pertinent imaging results/findings within the past 24 hours.      Assessment/Plan:      * Sepsis  78yo F presenting with general weakness, cough with increased sputum production of greenish color, fevers and leukocytosis recently seen in urgent care for UTI and prescribed macrobid.   - CXR with R lung base focal opacity concerning for PNA.   - UA with 2+ prot, 2+ leuk, negative nitrites, 2 RBC, 1 WBC, rare bacteria  - flu A&B negative, RIP negative  - Blood Cx (12/7): NGTD  - Resp gram stain with many GPC and rare GNR  - Resp Cx (12/7) growing GNR    - sepsis likely 2/2 PNA >> UTI  - continue IV ceftriaxone and azithromycin for empiric coverage of CAP and UTI  - follow-up respiratory cultures for identification and susceptibility  - patient clinically improving, remains afebrile, WBC trending down  - PT/OT recommending home health PT  - monitor daily CBC    Pneumonia  - see sepsis      VIRGILIO (acute kidney injury)  Patient with hx of CKD III 2/2 DM. Baseline Cr ~ 1.2-1.4  - s/p IVF resuscitation 2L NS  - monitor daily CMP  - Cr trending down    Hypomagnesemia  - continue home mag ox supplement      Type 2 diabetes mellitus with stage 3 chronic kidney disease, with long-term current use of insulin  Last HbA1c (9/17/19): 8.4%  Taking lantus 34U nightly at home  - holding basal insulin as patient was hypoglycemic to 61 on admission  - LDSSI  - glucose check QID  - diabetic diet  - resume insulin as appropriate now that patient is tolerating po diet      Essential hypertension  Home meds include: carvedilol 50 BID, irbesartan 300mg, diltiazem 120mg,chlorthalidone 25mg, hydralazine 100mg TID, clonidine patch 0.1mg    - held home BP meds in the setting of sepsis  - resumed hydralazine and clonidine patch  - added back carvedilol 25mg BID for BP control (increase to home dose as appropriate)  - continue to resume home BP meds as needed    Coronary artery disease involving native coronary artery without angina pectoris -  Non-obstructive 50% LAD  Hx CAD and CHF s/p ICD  - continue home ASA    Asthma, chronic  - duonebs q4h scheduled  - breo daily  - albuterol prn  - chest PT BID        VTE Risk Mitigation (From admission, onward)         Ordered     IP VTE HIGH RISK PATIENT  Once      12/07/19 4572     enoxaparin injection 30 mg  Daily      12/07/19 4798                      Eufemia Cortes MD  Department of Hospital Medicine   [facility]

## 2019-12-09 NOTE — ASSESSMENT & PLAN NOTE
Home meds include: carvedilol 50 BID, irbesartan 300mg, diltiazem 120mg,chlorthalidone 25mg, hydralazine 100mg TID, clonidine patch 0.1mg    - held some home BP meds in the setting of sepsis  - resumed hydralazine and clonidine patch. 12/9 adding diltiazem 120 mg   - added back carvedilol 25mg BID for BP control (increase to home dose as appropriate)  - continue to resume home BP meds as needed

## 2019-12-09 NOTE — ASSESSMENT & PLAN NOTE
Last HbA1c (9/17/19): 8.4%  Taking lantus 34U nightly at home  - Started on 3U of Aspart 10U of detemir   - LDSSI  - glucose check QID  - diabetic diet  - resume insulin as appropriate now that patient is tolerating po diet

## 2019-12-09 NOTE — ASSESSMENT & PLAN NOTE
78yo F presenting with general weakness, cough with increased sputum production of greenish color, fevers and leukocytosis recently seen in urgent care for UTI and prescribed macrobid. Urine culture negative,   - CXR with R lung base focal opacity concerning for PNA. Growing Pseudomonas   - UA with 2+ prot, 2+ leuk, negative nitrites, 2 RBC, 1 WBC, rare bacteria  - flu A&B negative, RIP negative  - Blood Cx (12/7): NGTD  - Resp gram stain with many GPC and rare GNR growing Pseudomonas   - Resp Cx (12/7) growing GNR  - On Levaquin 750 mg 5 day course     - sepsis likely 2/2 PNA >> UTI  - follow-up respiratory cultures for identification and susceptibility  - patient clinically improving, remains afebrile, WBC trending down  - PT/OT recommending home health PT  - daily CBC

## 2019-12-09 NOTE — PROGRESS NOTES
[facility] Hospital Medicine  Progress Note    Patient Name: Myesha Quinones  MRN: [id number]  Patient Class: IP- Inpatient   Admission Date: 12/7/2019  Length of Stay: 2 days  Attending Physician: Kelvin Young MD  Primary Care Provider: Emelina Gaston MD    [facility] Medicine Team: [facility] [place] [place] HOSP MED 1 LISSA Yoo MD    Subjective:     Principal Problem:Sepsis    HPI:  Ms. Myesha Quinones is an 78yo F with hx of HTN, DM, CAD and CHF s/p ICD, asthma, and remote hx of breast cancer as well as BCC who presents to the ED complaining of general weakness. Patient reports onset of back pain, chills, and nausea/vomiting x 4 days on 12/4. On Thurs 12/5, she went to urgent care and was found to have a UTI. She was prescribed macrobid, which she has been taking up until today. Patient has continued to feel poorly with decreased po intake and overall weakness. Yesterday 12/6, patient had a fall while walking between rooms with her  due to weakness. She hit her head on the wall and fell to the floor, but did not have LOC. Patient denies dysuria, hematuria, abdominal pain. Patient endorses chronic cough with sputum production that has recently changed color to dark/greenish. She has not had any difficulty breathing until arriving to the ED. Denies any recent sick contacts. Patient is also complaining of back pain that is worse with movement but not necessarily associated with breathing/inspiration.     On arrival to the ED, patient was febrile with T 102, HR 87, RR 20, /76, spO2 94% on RA. Lactate WNL. UA had 2+ leukocytes, negative nitrites, 2 RBC, 1 WBC, and rare bacteria. CXR showed right lung base focal opacity. CTH was negative for hemorrhage. CT abdo without any acute processes, no hydronephrosis or nephrolithiasis. She was given tylenol, 1 x dose vanc/zosyn/azithro, and ~2L IVF NS in the ED.    Overview/Hospital Course:  Patient admitted with sepsis 2/2 pneumonia. Started on IV  ceftriaxone and azithromycin. BCx NGTD. RIP negative. Resp gram stain with many GPC and rare GNR. Resp culture growing GNR Pseudomonas. Patient appears improved, afebrile, WBC count trending down on 12/8. Continuing with current abx as patient responding well. Patient swtiched to [facility] for Abx. Patient placed on DM management of 3U of Asaprt and 10U of detemir.  Patient complained about back pain from the fall before she came to hospital.     Interval History: Patient was restless all night due to pain on her left side back (thoracic to lumbar). Patient has history of fall before came to hospital and this pain kept her awake. Patient feeling better when she sits up in a chair. Patient did not have CV angle tenderness. Patient is continue to be afebrile and breathing is becoming better. Patient grew Pseudomonas on culture and abx was switched to Levaquin 5 day course. Patient given tramadol and flexile for one time dose for back pain.     Review of Systems   Constitutional: Positive for activity change. Negative for appetite change, chills, fatigue and fever.   HENT: Negative for congestion, rhinorrhea, sore throat and trouble swallowing.    Eyes: Negative for visual disturbance.   Respiratory: Positive for cough (productive reddish sputum) and shortness of breath (improved).    Cardiovascular: Negative for chest pain, palpitations and leg swelling.   Gastrointestinal: Negative for abdominal pain, constipation, diarrhea, nausea and vomiting.   Genitourinary: Positive for dysuria. Negative for decreased urine volume, difficulty urinating and hematuria.   Musculoskeletal: Positive for back pain. Negative for arthralgias, joint swelling, neck pain and neck stiffness.   Skin: Negative for color change and pallor.   Neurological: Positive for weakness. Negative for dizziness and headaches.   Psychiatric/Behavioral: Negative for agitation, behavioral problems and confusion.     Objective:     Vital Signs (Most  Recent):  Temp: 99.3 °F (37.4 °C) (12/09/19 1540)  Pulse: 75 (12/09/19 1556)  Resp: 17 (12/09/19 1556)  BP: (!) 192/83 (12/09/19 1540)  SpO2: (!) 90 % (12/09/19 1540) Vital Signs (24h Range):  Temp:  [98 °F (36.7 °C)-99.3 °F (37.4 °C)] 99.3 °F (37.4 °C)  Pulse:  [75-99] 75  Resp:  [15-18] 17  SpO2:  [90 %-99 %] 90 %  BP: (150-192)/(66-83) 192/83     Weight: 71.5 kg (157 lb 10.1 oz)  Body mass index is 27.92 kg/m².    Intake/Output Summary (Last 24 hours) at 12/9/2019 1654  Last data filed at 12/9/2019 0200  Gross per 24 hour   Intake 390 ml   Output --   Net 390 ml      Physical Exam   Constitutional: She is oriented to person, place, and time. She appears well-developed. No distress.   HENT:   Head: Normocephalic.   Mouth/Throat: Oropharynx is clear and moist.   Eyes: Pupils are equal, round, and reactive to light. Conjunctivae and EOM are normal. No scleral icterus.   Neck: Normal range of motion. Neck supple. No tracheal deviation present.   Cardiovascular: Normal rate, regular rhythm, normal heart sounds and intact distal pulses.   No murmur heard.  Pulmonary/Chest: Effort normal. No respiratory distress. She has no wheezes. She has no rales.   On 2L NC  Decreased breath sounds R lung base   Abdominal: Soft. Bowel sounds are normal. She exhibits no distension. There is no tenderness. There is no guarding and no CVA tenderness.   Musculoskeletal: She exhibits tenderness (on right side of her spine Thoracic and Lumbar. No visible signs of trauma ). She exhibits no edema or deformity.   Neurological: She is alert and oriented to person, place, and time.   Skin: Skin is warm and dry. She is not diaphoretic.       Significant Labs:   CBC:   Recent Labs   Lab 12/08/19  0402 12/08/19  1250 12/09/19  0452   WBC 14.76* 15.29* 11.26   HGB 8.2* 9.8* 9.3*   HCT 25.7* 30.2* 30.1*    212 245     CMP:   Recent Labs   Lab 12/08/19  0402 12/09/19  0452   * 135*   K 3.6 3.5    100   CO2 23 25   GLU 65* 227*    BUN 41* 37*   CREATININE 1.6* 1.5*   CALCIUM 7.9* 8.9   PROT 5.6*  --    ALBUMIN 1.9*  --    BILITOT 0.4  --    ALKPHOS 89  --    AST 24  --    ALT 12  --    ANIONGAP 9 10   EGFRNONAA 30.2* 32.7*     All pertinent labs within the past 24 hours have been reviewed.    Significant Imaging: I have reviewed all pertinent imaging results/findings within the past 24 hours.      Assessment/Plan:      * Sepsis  80yo F presenting with general weakness, cough with increased sputum production of greenish color, fevers and leukocytosis recently seen in urgent care for UTI and prescribed macrobid. Urine culture negative,   - CXR with R lung base focal opacity concerning for PNA. Growing Pseudomonas   - UA with 2+ prot, 2+ leuk, negative nitrites, 2 RBC, 1 WBC, rare bacteria  - flu A&B negative, RIP negative  - Blood Cx (12/7): NGTD  - Resp gram stain with many GPC and rare GNR growing Pseudomonas   - Resp Cx (12/7) growing GNR  - On Levaquin 750 mg 5 day course     - sepsis likely 2/2 PNA >> UTI  - follow-up respiratory cultures for identification and susceptibility  - patient clinically improving, remains afebrile, WBC trending down  - PT/OT recommending home health PT  - daily CBC    Acute right-sided thoracic back pain  Most likely from Trauma before coming into hospital   - Trial of Tramadol   - Trial of once Flexile  - Lidocaine patch        Pneumonia  - see sepsis      VIRGILIO (acute kidney injury)  Patient with hx of CKD III 2/2 DM. Baseline Cr ~ 1.2-1.4  - s/p IVF resuscitation 2L NS  - monitor daily CMP  - Cr trending down    Hypomagnesemia  - continue home mag ox supplement      Type 2 diabetes mellitus with stage 3 chronic kidney disease, with long-term current use of insulin  Last HbA1c (9/17/19): 8.4%  Taking lantus 34U nightly at home  - Started on 3U of Aspart 10U of detemir   - LDSSI  - glucose check QID  - diabetic diet  - resume insulin as appropriate now that patient is tolerating po diet      Essential  hypertension  Home meds include: carvedilol 50 BID, irbesartan 300mg, diltiazem 120mg,chlorthalidone 25mg, hydralazine 100mg TID, clonidine patch 0.1mg    - held some home BP meds in the setting of sepsis  - resumed hydralazine and clonidine patch. 12/9 adding diltiazem 120 mg   - added back carvedilol 25mg BID for BP control (increase to home dose as appropriate)  - continue to resume home BP meds as needed     Coronary artery disease involving native coronary artery without angina pectoris - Non-obstructive 50% LAD  Hx CAD and CHF s/p ICD  - continue home ASA     Asthma, chronic  - duonebs q4h scheduled  - breo daily  - albuterol prn  - chest PT BID         VTE Risk Mitigation (From admission, onward)         Ordered     IP VTE HIGH RISK PATIENT  Once      12/07/19 1858     enoxaparin injection 30 mg  Daily      12/07/19 1434                LISSA Yoo MD  Department of Hospital Medicine   [facility]

## 2019-12-09 NOTE — ASSESSMENT & PLAN NOTE
Home meds include: carvedilol 50 BID, irbesartan 300mg, diltiazem 120mg,chlorthalidone 25mg, hydralazine 100mg TID, clonidine patch 0.1mg    - held home BP meds in the setting of sepsis  - resumed hydralazine and clonidine patch  - added back carvedilol 25mg BID for BP control (increase to home dose as appropriate)  - continue to resume home BP meds as needed

## 2019-12-09 NOTE — SUBJECTIVE & OBJECTIVE
Interval History: Patient was restless all night due to pain on her left side back (thoracic to lumbar). Patient has history of fall before came to hospital and this pain kept her awake. Patient feeling better when she sits up in a chair. Patient did not have CV angle tenderness. Patient is continue to be afebrile and breathing is becoming better. Patient grew Pseudomonas on culture and abx was switched to Levaquin 5 day course. Patient given tramadol and flexile for one time dose for back pain.     Review of Systems   Constitutional: Positive for activity change. Negative for appetite change, chills, fatigue and fever.   HENT: Negative for congestion, rhinorrhea, sore throat and trouble swallowing.    Eyes: Negative for visual disturbance.   Respiratory: Positive for cough (productive reddish sputum) and shortness of breath (improved).    Cardiovascular: Negative for chest pain, palpitations and leg swelling.   Gastrointestinal: Negative for abdominal pain, constipation, diarrhea, nausea and vomiting.   Genitourinary: Positive for dysuria. Negative for decreased urine volume, difficulty urinating and hematuria.   Musculoskeletal: Positive for back pain. Negative for arthralgias, joint swelling, neck pain and neck stiffness.   Skin: Negative for color change and pallor.   Neurological: Positive for weakness. Negative for dizziness and headaches.   Psychiatric/Behavioral: Negative for agitation, behavioral problems and confusion.     Objective:     Vital Signs (Most Recent):  Temp: 99.3 °F (37.4 °C) (12/09/19 1540)  Pulse: 75 (12/09/19 1556)  Resp: 17 (12/09/19 1556)  BP: (!) 192/83 (12/09/19 1540)  SpO2: (!) 90 % (12/09/19 1540) Vital Signs (24h Range):  Temp:  [98 °F (36.7 °C)-99.3 °F (37.4 °C)] 99.3 °F (37.4 °C)  Pulse:  [75-99] 75  Resp:  [15-18] 17  SpO2:  [90 %-99 %] 90 %  BP: (150-192)/(66-83) 192/83     Weight: 71.5 kg (157 lb 10.1 oz)  Body mass index is 27.92 kg/m².    Intake/Output Summary (Last 24 hours)  at 12/9/2019 1654  Last data filed at 12/9/2019 0200  Gross per 24 hour   Intake 390 ml   Output --   Net 390 ml      Physical Exam   Constitutional: She is oriented to person, place, and time. She appears well-developed. No distress.   HENT:   Head: Normocephalic.   Mouth/Throat: Oropharynx is clear and moist.   Eyes: Pupils are equal, round, and reactive to light. Conjunctivae and EOM are normal. No scleral icterus.   Neck: Normal range of motion. Neck supple. No tracheal deviation present.   Cardiovascular: Normal rate, regular rhythm, normal heart sounds and intact distal pulses.   No murmur heard.  Pulmonary/Chest: Effort normal. No respiratory distress. She has no wheezes. She has no rales.   On 2L NC  Decreased breath sounds R lung base   Abdominal: Soft. Bowel sounds are normal. She exhibits no distension. There is no tenderness. There is no guarding and no CVA tenderness.   Musculoskeletal: She exhibits tenderness (on right side of her spine Thoracic and Lumbar. No visible signs of trauma ). She exhibits no edema or deformity.   Neurological: She is alert and oriented to person, place, and time.   Skin: Skin is warm and dry. She is not diaphoretic.       Significant Labs:   CBC:   Recent Labs   Lab 12/08/19  0402 12/08/19  1250 12/09/19  0452   WBC 14.76* 15.29* 11.26   HGB 8.2* 9.8* 9.3*   HCT 25.7* 30.2* 30.1*    212 245     CMP:   Recent Labs   Lab 12/08/19  0402 12/09/19  0452   * 135*   K 3.6 3.5    100   CO2 23 25   GLU 65* 227*   BUN 41* 37*   CREATININE 1.6* 1.5*   CALCIUM 7.9* 8.9   PROT 5.6*  --    ALBUMIN 1.9*  --    BILITOT 0.4  --    ALKPHOS 89  --    AST 24  --    ALT 12  --    ANIONGAP 9 10   EGFRNONAA 30.2* 32.7*     All pertinent labs within the past 24 hours have been reviewed.    Significant Imaging: I have reviewed all pertinent imaging results/findings within the past 24 hours.

## 2019-12-09 NOTE — ASSESSMENT & PLAN NOTE
Last HbA1c (9/17/19): 8.4%  Taking lantus 34U nightly at home  - holding basal insulin as patient was hypoglycemic to 61 on admission  - LDSSI  - glucose check QID  - diabetic diet  - resume insulin as appropriate now that patient is tolerating po diet

## 2019-12-09 NOTE — ASSESSMENT & PLAN NOTE
Patient with hx of CKD III 2/2 DM. Baseline Cr ~ 1.2-1.4  - s/p IVF resuscitation 2L NS  - monitor daily CMP  - Cr trending down

## 2019-12-09 NOTE — ASSESSMENT & PLAN NOTE
78yo F presenting with general weakness, cough with increased sputum production of greenish color, fevers and leukocytosis recently seen in urgent care for UTI and prescribed macrobid.   - CXR with R lung base focal opacity concerning for PNA.   - UA with 2+ prot, 2+ leuk, negative nitrites, 2 RBC, 1 WBC, rare bacteria  - flu A&B negative, RIP negative  - Blood Cx (12/7): NGTD  - Resp gram stain with many GPC and rare GNR  - Resp Cx (12/7) growing GNR    - sepsis likely 2/2 PNA >> UTI  - continue IV ceftriaxone and azithromycin for empiric coverage of CAP and UTI  - follow-up respiratory cultures for identification and susceptibility  - patient clinically improving, remains afebrile, WBC trending down  - daily CBC

## 2019-12-10 ENCOUNTER — TELEPHONE (OUTPATIENT)
Dept: DERMATOLOGY | Facility: CLINIC | Age: 79
End: 2019-12-10

## 2019-12-10 VITALS
HEIGHT: 63 IN | SYSTOLIC BLOOD PRESSURE: 196 MMHG | HEART RATE: 78 BPM | WEIGHT: 158.75 LBS | DIASTOLIC BLOOD PRESSURE: 81 MMHG | TEMPERATURE: 97 F | OXYGEN SATURATION: 92 % | BODY MASS INDEX: 28.13 KG/M2 | RESPIRATION RATE: 19 BRPM

## 2019-12-10 LAB
ANION GAP SERPL CALC-SCNC: 9 MMOL/L (ref 8–16)
BUN SERPL-MCNC: 27 MG/DL (ref 8–23)
CALCIUM SERPL-MCNC: 9 MG/DL (ref 8.7–10.5)
CHLORIDE SERPL-SCNC: 101 MMOL/L (ref 95–110)
CO2 SERPL-SCNC: 26 MMOL/L (ref 23–29)
CREAT SERPL-MCNC: 1.3 MG/DL (ref 0.5–1.4)
EST. GFR  (AFRICAN AMERICAN): 44.8 ML/MIN/1.73 M^2
EST. GFR  (NON AFRICAN AMERICAN): 38.8 ML/MIN/1.73 M^2
FINAL PATHOLOGIC DIAGNOSIS: NORMAL
GLUCOSE SERPL-MCNC: 170 MG/DL (ref 70–110)
GROSS: NORMAL
MAGNESIUM SERPL-MCNC: 1.7 MG/DL (ref 1.6–2.6)
PHOSPHATE SERPL-MCNC: 2.4 MG/DL (ref 2.7–4.5)
POCT GLUCOSE: 162 MG/DL (ref 70–110)
POTASSIUM SERPL-SCNC: 3.7 MMOL/L (ref 3.5–5.1)
SODIUM SERPL-SCNC: 136 MMOL/L (ref 136–145)

## 2019-12-10 PROCEDURE — 99238 HOSP IP/OBS DSCHRG MGMT 30/<: CPT | Mod: HCNC,GC,, | Performed by: HOSPITALIST

## 2019-12-10 PROCEDURE — 25000003 PHARM REV CODE 250: Mod: HCNC | Performed by: STUDENT IN AN ORGANIZED HEALTH CARE EDUCATION/TRAINING PROGRAM

## 2019-12-10 PROCEDURE — 99238 PR HOSPITAL DISCHARGE DAY,<30 MIN: ICD-10-PCS | Mod: HCNC,GC,, | Performed by: HOSPITALIST

## 2019-12-10 PROCEDURE — 84100 ASSAY OF PHOSPHORUS: CPT | Mod: HCNC

## 2019-12-10 PROCEDURE — 36415 COLL VENOUS BLD VENIPUNCTURE: CPT | Mod: HCNC

## 2019-12-10 PROCEDURE — 80048 BASIC METABOLIC PNL TOTAL CA: CPT | Mod: HCNC

## 2019-12-10 PROCEDURE — 83735 ASSAY OF MAGNESIUM: CPT | Mod: HCNC

## 2019-12-10 PROCEDURE — 94761 N-INVAS EAR/PLS OXIMETRY MLT: CPT | Mod: HCNC

## 2019-12-10 RX ORDER — TRAMADOL HYDROCHLORIDE 50 MG/1
50 TABLET ORAL EVERY 12 HOURS PRN
Qty: 8 TABLET | Refills: 0 | Status: SHIPPED | OUTPATIENT
Start: 2019-12-10 | End: 2019-12-10 | Stop reason: SDUPTHER

## 2019-12-10 RX ORDER — TRAMADOL HYDROCHLORIDE 50 MG/1
50 TABLET ORAL EVERY 12 HOURS PRN
Qty: 8 TABLET | Refills: 0 | Status: SHIPPED | OUTPATIENT
Start: 2019-12-10 | End: 2019-12-16 | Stop reason: SDUPTHER

## 2019-12-10 RX ORDER — IRBESARTAN 150 MG/1
300 TABLET ORAL DAILY
Status: DISCONTINUED | OUTPATIENT
Start: 2019-12-10 | End: 2019-12-10 | Stop reason: HOSPADM

## 2019-12-10 RX ORDER — CARVEDILOL 25 MG/1
25 TABLET ORAL 2 TIMES DAILY WITH MEALS
Qty: 360 TABLET | Refills: 3
Start: 2019-12-10 | End: 2020-04-06

## 2019-12-10 RX ORDER — HYDRALAZINE HYDROCHLORIDE 100 MG/1
100 TABLET, FILM COATED ORAL 3 TIMES DAILY
Qty: 270 TABLET | Refills: 2 | Status: SHIPPED | OUTPATIENT
Start: 2019-12-10 | End: 2020-10-29 | Stop reason: SDUPTHER

## 2019-12-10 RX ORDER — LEVOFLOXACIN 750 MG/1
750 TABLET ORAL EVERY OTHER DAY
Qty: 2 TABLET | Refills: 0 | Status: SHIPPED | OUTPATIENT
Start: 2019-12-11 | End: 2019-12-14

## 2019-12-10 RX ORDER — LIDOCAINE 50 MG/G
1 PATCH TOPICAL DAILY
Qty: 30 PATCH | Refills: 0 | Status: SHIPPED | OUTPATIENT
Start: 2019-12-10 | End: 2020-07-27

## 2019-12-10 RX ADMIN — FLUTICASONE PROPIONATE 100 MCG: 50 SPRAY, METERED NASAL at 10:12

## 2019-12-10 RX ADMIN — Medication 400 MG: at 10:12

## 2019-12-10 RX ADMIN — INSULIN ASPART 3 UNITS: 100 INJECTION, SOLUTION INTRAVENOUS; SUBCUTANEOUS at 09:12

## 2019-12-10 RX ADMIN — HYDRALAZINE HYDROCHLORIDE 100 MG: 50 TABLET, FILM COATED ORAL at 10:12

## 2019-12-10 RX ADMIN — CARVEDILOL 25 MG: 25 TABLET, FILM COATED ORAL at 07:12

## 2019-12-10 RX ADMIN — PRAVASTATIN SODIUM 40 MG: 40 TABLET ORAL at 10:12

## 2019-12-10 RX ADMIN — FLUTICASONE FUROATE AND VILANTEROL TRIFENATATE 1 PUFF: 100; 25 POWDER RESPIRATORY (INHALATION) at 10:12

## 2019-12-10 RX ADMIN — LIDOCAINE 1 PATCH: 50 PATCH TOPICAL at 10:12

## 2019-12-10 RX ADMIN — ACETAMINOPHEN 650 MG: 325 TABLET ORAL at 06:12

## 2019-12-10 RX ADMIN — DILTIAZEM HYDROCHLORIDE 120 MG: 120 CAPSULE, COATED, EXTENDED RELEASE ORAL at 10:12

## 2019-12-10 NOTE — ASSESSMENT & PLAN NOTE
HOME MEDS = carvedilol 50 mg BID, irbesartan 300 mg qday, diltiazem 120 mg qday, chlorthalidone 25 mg qday, hydralazine 100 mg TID, clonidine patch 0.1 mg/24 h    PLAN:  - continue home carvedilol, diltiazem, hydralazine, clonidine  - holding chlorthalidone, irbesartan in setting of VIRGILIO

## 2019-12-10 NOTE — NURSING
Patient alert and oriented X4. Discharge instructions given at bedside, patient and  verbalize understanding.  No falls, no injuries to report.

## 2019-12-10 NOTE — PLAN OF CARE
POC reviewed with patient and  at bedside, they both verbalize understanding.  Assessment, VS, and prescribed interventions completed see flow sheet.  No major health problems, no falls, no injuries to report during shift.

## 2019-12-10 NOTE — ASSESSMENT & PLAN NOTE
HOME MEDS = fluticasone-salmeterol, Ventolin INH    PLAN:  - continue scheduled Duonebs q4h, albuterol INH PRN  - continue Breo (home Advair not on formulary)  - chest PT BID

## 2019-12-10 NOTE — ASSESSMENT & PLAN NOTE
HOME MED = linagliptin, metformin, Lantus 34U QHS  A1c (9/17/19) = 8.4    PLAN:  - continue 3U Aspart with meals  - continue 10U detemir QHS   - LDSSI, POCT glucose AC/HS  - diabetic diet

## 2019-12-10 NOTE — PLAN OF CARE
[facility]    HOME HEALTH ORDERS  FACE TO FACE ENCOUNTER    Patient Name: Myesha Quinones  YOB: 1940    PCP: Emelina Gaston MD   PCP Address: [address] [facility] [place] [address]  PCP Phone Number: [phone]  PCP Fax: [phone]    Encounter Date: 12/10/2019    Admit to Home Health    Diagnoses:  Active Hospital Problems    Diagnosis  POA    *Pseudomonas pneumonia [J15.1]  Yes    Acute right-sided thoracic back pain [M54.6]  Yes    VIRGILIO (acute kidney injury) [N17.9]  Yes    Hypomagnesemia [E83.42]  Yes    Type 2 diabetes mellitus with stage 3 chronic kidney disease, with long-term current use of insulin [E11.22, N18.3, Z79.4]  Not Applicable    Coronary artery disease involving native coronary artery without angina pectoris - Non-obstructive 50% LAD [I25.10]  Yes    Essential hypertension [I10]  Yes    Asthma, chronic [J45.909]  Yes     Stable on Advair 250/50 BID. Ventolin for rescue and Duo Nebs PRN        Resolved Hospital Problems   No resolved problems to display.       Future Appointments   Date Time Provider Department Center   12/12/2019 10:00 AM ROBERTO Carter [facility] ENT [facility]   12/16/2019 10:00 AM Emelina Gaston MD [facility] IM [address] PCW   12/17/2019 11:20 AM Brittany Metcalf MD [facility] MARIAM [facility]   1/2/2020  8:10 AM CARRIE Arechiga MD [facility] OPHTHAL [place]   1/8/2020  9:30 AM Jose Luis Fernando MD [facility] CARDIO [address]   1/17/2020  8:00 AM LAB, [facility]ES [facility] LAB [place]   1/17/2020  8:15 AM JOSEP CHÁVEZ SPECLAB [place]   1/20/2020  8:00 AM Emelina Gaston MD [facility] IM [address] PCW   2/5/2020 10:20 AM Chinedu Castillo MD [facility] NEPHRO [address]   2/13/2020  8:00 AM LAB, JANES [facility] LAB [place]   2/18/2020 10:00 AM HOME MONITOR DEVICE CHECK, [facility] MONICA ARRHPRO [address]   2/20/2020 10:00 AM Veronica Nick, NP [facility] ENDODIA [address]   3/11/2020  1:30 PM Mary Magaña DPM [facility] [place]           I have seen and  examined this patient face to face today. My clinical findings that support the need for the home health skilled services and home bound status are the following:  Weakness/numbness causing balance and gait disturbance due to Infection making it taxing to leave home.    Allergies:  Review of patient's allergies indicates:   Allergen Reactions    Iodine and iodide containing products Hives    Nifedipine      weakness       Diet: diabetic diet: 2000 calorie    Activities: ad lacy    Nursing:   SN to complete comprehensive assessment including routine vital signs. Instruct on disease process and s/s of complications to report to MD. Review/verify medication list sent home with the patient at time of discharge  and instruct patient/caregiver as needed. Frequency may be adjusted depending on start of care date.    Notify MD if SBP > 160 or < 90; DBP > 90 or < 50; HR > 120 or < 50; Temp > 101    CONSULTS:    Physical Therapy to evaluate and treat. Evaluate for home safety and equipment needs; Establish/upgrade home exercise program. Perform / instruct on therapeutic exercises, gait training, transfer training, and Range of Motion.  Occupational Therapy to evaluate and treat. Evaluate home environment for safety and equipment needs. Perform/Instruct on transfers, ADL training, ROM, and therapeutic exercises.    MISCELLANEOUS CARE:  Diabetic Care:   SN to perform and educate Diabetic management with blood glucose monitoring:, Fingerstick blood sugar AC and HS and Report CBG < 60 or > 350 to physician.    WOUND CARE ORDERS  n/a    Medications: Review discharge medications with patient and family and provide education.      Current Discharge Medication List      START taking these medications    Details   levoFLOXacin (LEVAQUIN) 750 MG tablet Take 1 tablet (750 mg total) by mouth every other day. for 2 doses  Qty: 2 tablet, Refills: 0      lidocaine (LIDODERM) 5 % Place 1 patch onto the skin once daily. Place patch to right  back. Leave on for 12 hours and remove for 12 hours.  Qty: 30 patch, Refills: 0      traMADol (ULTRAM) 50 mg tablet Take 1 tablet (50 mg total) by mouth every 12 (twelve) hours as needed for Pain.  Qty: 8 tablet, Refills: 0         CONTINUE these medications which have CHANGED    Details   carvedilol (COREG) 25 MG tablet Take 1 tablet (25 mg total) by mouth 2 (two) times daily with meals.  Qty: 360 tablet, Refills: 3      hydrALAZINE (APRESOLINE) 100 MG tablet Take 1 tablet (100 mg total) by mouth 3 (three) times daily.  Qty: 270 tablet, Refills: 2    Associated Diagnoses: Essential hypertension         CONTINUE these medications which have NOT CHANGED    Details   acetaminophen (TYLENOL) 500 MG tablet Take 1,000 mg by mouth daily as needed for Pain.      aspirin (ENTERIC COATED ASPIRIN) 81 MG EC tablet Take 1 tablet by mouth once daily.       benzonatate (TESSALON PERLES) 100 MG capsule Take 2 capsules (200 mg total) by mouth 3 (three) times daily as needed for Cough.  Qty: 90 capsule, Refills: 3    Associated Diagnoses: Mild intermittent chronic asthma without complication; Cough      blood sugar diagnostic (ACCU-CHEK HECTOR) Strp Uses Accu-Check Hector meter to test BG 4x/day  Qty: 400 strip, Refills: 6    Associated Diagnoses: Diabetic polyneuropathy associated with type 2 diabetes mellitus      chlorthalidone (HYGROTEN) 25 MG Tab Take 1 tablet (25 mg total) by mouth once daily.  Qty: 90 tablet, Refills: 3      cholecalciferol, vitamin D3, (VITAMIN D3) 2,000 unit Tab Take 1 tablet by mouth once daily.       cloNIDine 0.1 mg/24 hr td ptwk (CATAPRES) 0.1 mg/24 hr Place 1 patch onto the skin every 7 days.  Qty: 4 patch, Refills: 11    Associated Diagnoses: Essential hypertension      CYANOCOBALAMIN, VITAMIN B-12, (B-12 DOTS ORAL) Take 1 tablet by mouth once daily.      diltiaZEM (CARDIZEM CD) 120 MG Cp24 Take 1 capsule (120 mg total) by mouth once daily.  Qty: 90 capsule, Refills: 3    Associated Diagnoses: Type 2 DM  "with CKD stage 3 and hypertension      fluticasone-salmeterol diskus inhaler 250-50 mcg Inhale 1 puff into the lungs 2 (two) times daily. This is a change from one month  Qty: 180 each, Refills: 3      insulin (LANTUS SOLOSTAR U-100 INSULIN) glargine 100 units/mL (3mL) SubQ pen Inject 34 Units into the skin every evening.  Qty: 45 mL, Refills: 6    Associated Diagnoses: Diabetic polyneuropathy associated with type 2 diabetes mellitus      irbesartan (AVAPRO) 300 MG tablet Take 1 tablet (300 mg total) by mouth every evening.  Qty: 90 tablet, Refills: 3    Associated Diagnoses: Nonischemic cardiomyopathy; Essential hypertension      lancets (ACCU-CHEK SOFTCLIX LANCETS) Misc Uses Accu-Chek Angelica meter to test BG 4x/day  Qty: 400 each, Refills: 3    Associated Diagnoses: Diabetic polyneuropathy associated with type 2 diabetes mellitus      linaGLIPtin (TRADJENTA) 5 mg Tab tablet Take 1 tablet (5 mg total) by mouth once daily.  Qty: 90 tablet, Refills: 3    Associated Diagnoses: Diabetic polyneuropathy associated with type 2 diabetes mellitus      magnesium oxide (MAG-OX) 400 mg tablet Take 1 tablet (400 mg total) by mouth once daily.  Refills: 0      metFORMIN (GLUCOPHAGE) 500 MG tablet Take 1 tablet (500 mg total) by mouth 2 (two) times daily with meals.  Qty: 180 tablet, Refills: 6    Associated Diagnoses: Diabetic polyneuropathy associated with type 2 diabetes mellitus      omega-3 fatty acids (FISH OIL) 500 mg Cap Take 1 capsule by mouth Twice daily.      pen needle, diabetic (BD ULTRA-FINE MINI PEN NEEDLE) 31 gauge x 3/16" Ndle USE WITH LANTUS AT BEDTIME  Qty: 400 each, Refills: 3    Associated Diagnoses: Diabetic polyneuropathy associated with type 2 diabetes mellitus      pravastatin (PRAVACHOL) 40 MG tablet Take 1 tablet (40 mg total) by mouth once daily.  Qty: 90 tablet, Refills: 3    Associated Diagnoses: Mixed hyperlipidemia      VENTOLIN HFA 90 mcg/actuation inhaler INHALE 2 PUFFS INTO THE LUNGS EVERY 6 " (SIX) HOURS AS NEEDED FOR WHEEZING. RESCUE  Qty: 18 Inhaler, Refills: 12      azelastine (ASTELIN) 137 mcg (0.1 %) nasal spray 1 spray (137 mcg total) by Nasal route 2 (two) times daily. For severe allergy  Qty: 30 mL, Refills: 1      fluticasone (FLONASE) 50 mcg/actuation nasal spray 2 sprays (100 mcg total) by Each Nare route once daily.  Qty: 1 Bottle, Refills: 12      levocetirizine (XYZAL) 5 MG tablet Take 1 tablet (5 mg total) by mouth every evening.  Qty: 30 tablet, Refills: 11      nitroGLYCERIN (NITROSTAT) 0.4 MG SL tablet Place 0.4 mg under the tongue every 5 (five) minutes as needed for Chest pain.       ondansetron (ZOFRAN-ODT) 4 MG TbDL Take 1 tablet (4 mg total) by mouth every 6 (six) hours as needed.  Qty: 30 tablet, Refills: 0    Associated Diagnoses: Non-intractable vomiting with nausea, unspecified vomiting type         STOP taking these medications       nitrofurantoin, macrocrystal-monohydrate, (MACROBID) 100 MG capsule Comments:   Reason for Stopping:         GAVILYTE-G 236-22.74-6.74 -5.86 gram suspension Comments:   Reason for Stopping:             I certify that this patient is confined to her home and needs physical therapy and occupational therapy.

## 2019-12-10 NOTE — ASSESSMENT & PLAN NOTE
Most likely muscle strain from before coming into hospital     PLAN:  - cyclobenzaprine, tramadol, lidocaine patch

## 2019-12-10 NOTE — ASSESSMENT & PLAN NOTE
CKD III 2/2 DM - baseline Cr 1.2-1.4, with proteinuria    PLAN:  - monitor renal function daily  - avoid nephrotoxins  - renally dose medications

## 2019-12-10 NOTE — ASSESSMENT & PLAN NOTE
78 y/o female p/w with general weakness, productive cough, fever 102, WBC 14.8. Had been recently seen in urgent care for UTI and prescribed macrobid. CXR with R lung base focal opacity concerning for PNA.    UA - 1 WBC, 2 RBC, rare bacteria  Blood cx - NGTD  RIP - negative  resp cx - few Pseudomonas    PLAN:  - continue levofloxacin for Pseudomonas PNA

## 2019-12-10 NOTE — DISCHARGE SUMMARY
[facility] Hospital Medicine  Discharge Summary      Patient Name: Myesha Quinones  MRN: [id number]  Admission Date: 12/7/2019  Hospital Length of Stay: 3 days  Discharge Date and Time: 12/10/2019  9:34 AM  Attending Physician: No att. providers found   Discharging Provider: Julio Cesar Talley MD  Primary Care Provider: Emelina Gaston MD  [facility] Medicine Team: [facility] HOSP MED 1 Julio Cesar Talley MD    HPI:   Ms. Myesha Quinones is an 78yo F with hx of HTN, DM, CAD and CHF s/p ICD, asthma, and remote hx of breast cancer as well as BCC who presents to the ED complaining of general weakness. Patient reports onset of back pain, chills, and nausea/vomiting x 4 days on 12/4. On Thurs 12/5, she went to urgent care and was found to have a UTI. She was prescribed macrobid, which she has been taking up until today. Patient has continued to feel poorly with decreased po intake and overall weakness. Yesterday 12/6, patient had a fall while walking between rooms with her  due to weakness. She hit her head on the wall and fell to the floor, but did not have LOC. Patient denies dysuria, hematuria, abdominal pain. Patient endorses chronic cough with sputum production that has recently changed color to dark/greenish. She has not had any difficulty breathing until arriving to the ED. Denies any recent sick contacts. Patient is also complaining of back pain that is worse with movement but not necessarily associated with breathing/inspiration.     On arrival to the ED, patient was febrile with T 102, HR 87, RR 20, /76, spO2 94% on RA. Lactate WNL. UA had 2+ leukocytes, negative nitrites, 2 RBC, 1 WBC, and rare bacteria. CXR showed right lung base focal opacity. CTH was negative for hemorrhage. CT abdo without any acute processes, no hydronephrosis or nephrolithiasis. She was given tylenol, 1 x dose vanc/zosyn/azithro, and ~2L IVF NS in the ED.    * No surgery found *      Hospital Course:   80 y/o female  with HTN, DM, CAD, CHF s/p ICD, asthma who presented with sepsis(fever 102, WBC 15) 2/2 pneumonia (CXR with R base opacity). Started on IV ceftriaxone/azithromycin. RIP negative. Resp cx grew out Pseudomonas - switched to PO levofloxacin. Home antihypertensives were restarted with the exception of chlorthalidone and irbesartan 2/2 VIRGILIO (Cr peaked at 2.2 and was down to baseline of 1.3 on day of discharge). Of note, she did have a fall prior to admission where she hit her back/head - TTP paraspinal area; pain improved over course of hospital stay with lidocaine patch/Flexeril/tramadol.    Discharged home in stable condition on 12/10/19 with home health PT/OT. Will follow-up with PCP for worsening/non-resolving back pain.     Vitals:    12/10/19 1007   BP:    Pulse: 78   Resp: 19   Temp:      Physical Exam   Constitutional: She is oriented to person, place, and time and well-developed, well-nourished, and in no distress. No distress.   HENT:   Head: Normocephalic and atraumatic.   Mouth/Throat: Oropharynx is clear and moist.   Eyes: No scleral icterus.   Cardiovascular: Normal rate and regular rhythm. Exam reveals no gallop and no friction rub.   No murmur heard.  Pulmonary/Chest: Effort normal. No respiratory distress. She has no wheezes. She has no rales.   Abdominal: Soft. She exhibits no distension. There is no tenderness.   Musculoskeletal: She exhibits no edema.   R paraspinal TTP   Neurological: She is alert and oriented to person, place, and time.   Skin: Skin is dry. She is not diaphoretic.     Consults:   Consults (From admission, onward)        Status Ordering Provider     Inpatient consult to Registered Dietitian/Nutritionist  Once     Provider:  (Not yet assigned)    Completed FLOR GRECO          * Pseudomonas pneumonia  80 y/o female p/w with general weakness, productive cough, fever 102, WBC 14.8. Had been recently seen in urgent care for UTI and prescribed macrobid. CXR with R lung base focal opacity  concerning for PNA.    UA - 1 WBC, 2 RBC, rare bacteria  Blood cx - NGTD  RIP - negative  resp cx - few Pseudomonas    PLAN:  - continue levofloxacin for Pseudomonas PNA    Acute right-sided thoracic back pain  Most likely muscle strain from before coming into hospital     PLAN:  - cyclobenzaprine, tramadol, lidocaine patch    VIRGILIO (acute kidney injury)  CKD III 2/2 DM - baseline Cr 1.2-1.4, with proteinuria    PLAN:  - monitor renal function daily  - avoid nephrotoxins  - renally dose medications    Type 2 diabetes mellitus with stage 3 chronic kidney disease, with long-term current use of insulin  HOME MED = linagliptin, metformin, Lantus 34U QHS  A1c (9/17/19) = 8.4    PLAN:  - continue 3U Aspart with meals  - continue 10U detemir QHS   - LDSSI, POCT glucose AC/HS  - diabetic diet    Essential hypertension  HOME MEDS = carvedilol 50 mg BID, irbesartan 300 mg qday, diltiazem 120 mg qday, chlorthalidone 25 mg qday, hydralazine 100 mg TID, clonidine patch 0.1 mg/24 h    PLAN:  - continue home carvedilol, diltiazem, hydralazine, clonidine  - holding chlorthalidone, irbesartan in setting of VIRGILIO    Coronary artery disease involving native coronary artery without angina pectoris - Non-obstructive 50% LAD  History of CAD and CHF s/p ICD    PLAN:  - start home ASA  - continue home pravastatin    Asthma, chronic  HOME MEDS = fluticasone-salmeterol, Ventolin INH    PLAN:  - continue scheduled Duonebs q4h, albuterol INH PRN  - continue Breo (home Advair not on formulary)  - chest PT BID      Final Active Diagnoses:    Diagnosis Date Noted POA    PRINCIPAL PROBLEM:  Pseudomonas pneumonia [J15.1] 12/07/2019 Yes    Acute right-sided thoracic back pain [M54.6] 12/09/2019 Yes    VIRGILIO (acute kidney injury) [N17.9] 12/07/2019 Yes    Hypomagnesemia [E83.42] 08/31/2017 Yes    Type 2 diabetes mellitus with stage 3 chronic kidney disease, with long-term current use of insulin [E11.22, N18.3, Z79.4] 01/26/2017 Not Applicable     Coronary artery disease involving native coronary artery without angina pectoris - Non-obstructive 50% LAD [I25.10] 07/20/2015 Yes    Essential hypertension [I10] 07/20/2015 Yes    Asthma, chronic [J45.909] 06/05/2013 Yes      Problems Resolved During this Admission:       Discharged Condition: stable    Disposition: Another Health Care Inst*    Follow Up:    Patient Instructions:   No discharge procedures on file.    Significant Diagnostic Studies: Labs:   CMP   Recent Labs   Lab 12/09/19 0452 12/10/19  0527   * 136   K 3.5 3.7    101   CO2 25 26   * 170*   BUN 37* 27*   CREATININE 1.5* 1.3   CALCIUM 8.9 9.0   ANIONGAP 10 9   ESTGFRAFRICA 37.7* 44.8*   EGFRNONAA 32.7* 38.8*    and CBC   Recent Labs   Lab 12/09/19 0452   WBC 11.26   HGB 9.3*   HCT 30.1*          Pending Diagnostic Studies:     None         Medications:  Reconciled Home Medications:      Medication List      START taking these medications    levoFLOXacin 750 MG tablet  Commonly known as:  LEVAQUIN  Take 1 tablet (750 mg total) by mouth every other day. for 2 doses  Start taking on:  December 11, 2019     lidocaine 5 %  Commonly known as:  LIDODERM  Place 1 patch onto the skin once daily. Place patch to right back. Leave on for 12 hours and remove for 12 hours.     traMADol 50 mg tablet  Commonly known as:  ULTRAM  Take 1 tablet (50 mg total) by mouth every 12 (twelve) hours as needed for Pain.        CHANGE how you take these medications    blood sugar diagnostic Strp  Commonly known as:  Accu-Chek Nagelica  Uses Accu-Check Angelica meter to test BG 4x/day  What changed:  additional instructions     carvedilol 25 MG tablet  Commonly known as:  COREG  Take 1 tablet (25 mg total) by mouth 2 (two) times daily with meals.  What changed:    · how much to take  · when to take this     cloNIDine 0.1 mg/24 hr td ptwk 0.1 mg/24 hr  Commonly known as:  CATAPRES  Place 1 patch onto the skin every 7 days.  What changed:  additional  instructions     hydrALAZINE 100 MG tablet  Commonly known as:  APRESOLINE  Take 1 tablet (100 mg total) by mouth 3 (three) times daily.  What changed:  when to take this     lancets Misc  Commonly known as:  Accu-Chek Softclix Lancets  Uses Accu-Chek Angelica meter to test BG 4x/day  What changed:  additional instructions        CONTINUE taking these medications    acetaminophen 500 MG tablet  Commonly known as:  TYLENOL  Take 1,000 mg by mouth daily as needed for Pain.     azelastine 137 mcg (0.1 %) nasal spray  Commonly known as:  ASTELIN  1 spray (137 mcg total) by Nasal route 2 (two) times daily. For severe allergy     B-12 DOTS ORAL  Take 1 tablet by mouth once daily.     benzonatate 100 MG capsule  Commonly known as:  Tessalon Perles  Take 2 capsules (200 mg total) by mouth 3 (three) times daily as needed for Cough.     chlorthalidone 25 MG Tab  Commonly known as:  HYGROTEN  Take 1 tablet (25 mg total) by mouth once daily.     diltiaZEM 120 MG Cp24  Commonly known as:  CARDIZEM CD  Take 1 capsule (120 mg total) by mouth once daily.     Enteric Coated Aspirin 81 MG EC tablet  Generic drug:  aspirin  Take 1 tablet by mouth once daily.     Fish Oil 500 mg Cap  Generic drug:  omega-3 fatty acids  Take 1 capsule by mouth Twice daily.     fluticasone propionate 50 mcg/actuation nasal spray  Commonly known as:  FLONASE  2 sprays (100 mcg total) by Each Nare route once daily.     insulin glargine 100 units/mL (3mL) SubQ pen  Commonly known as:  Lantus Solostar U-100 Insulin  Inject 34 Units into the skin every evening.     irbesartan 300 MG tablet  Commonly known as:  AVAPRO  Take 1 tablet (300 mg total) by mouth every evening.     levocetirizine 5 MG tablet  Commonly known as:  XYZAL  Take 1 tablet (5 mg total) by mouth every evening.     linaGLIPtin 5 mg Tab tablet  Commonly known as:  Tradjenta  Take 1 tablet (5 mg total) by mouth once daily.     magnesium oxide 400 mg (241.3 mg magnesium) tablet  Commonly known as:   "MAG-OX  Take 1 tablet (400 mg total) by mouth once daily.     metFORMIN 500 MG tablet  Commonly known as:  GLUCOPHAGE  Take 1 tablet (500 mg total) by mouth 2 (two) times daily with meals.     nitroGLYCERIN 0.4 MG SL tablet  Commonly known as:  NITROSTAT  Place 0.4 mg under the tongue every 5 (five) minutes as needed for Chest pain.     ondansetron 4 MG Tbdl  Commonly known as:  ZOFRAN-ODT  Take 1 tablet (4 mg total) by mouth every 6 (six) hours as needed.     pen needle, diabetic 31 gauge x 3/16" Ndle  Commonly known as:  BD Ultra-Fine Mini Pen Needle  USE WITH LANTUS AT BEDTIME     pravastatin 40 MG tablet  Commonly known as:  PRAVACHOL  Take 1 tablet (40 mg total) by mouth once daily.     Ventolin HFA 90 mcg/actuation inhaler  Generic drug:  albuterol  INHALE 2 PUFFS INTO THE LUNGS EVERY 6 (SIX) HOURS AS NEEDED FOR WHEEZING. RESCUE     Vitamin D3 2,000 unit Tab  Generic drug:  cholecalciferol (vitamin D3)  Take 1 tablet by mouth once daily.     Wixela Inhub 250-50 mcg/dose diskus inhaler  Generic drug:  fluticasone-salmeterol 250-50 mcg/dose  Inhale 1 puff into the lungs 2 (two) times daily. This is a change from one month        STOP taking these medications    GaviLyte-G 236-22.74-6.74 -5.86 gram suspension  Generic drug:  polyethylene glycol     nitrofurantoin (macrocrystal-monohydrate) 100 MG capsule  Commonly known as:  MACROBID          Indwelling Lines/Drains at time of discharge:   Lines/Drains/Airways     None               Time spent on the discharge of patient: 40 minutes  Patient was seen and examined on the date of discharge and determined to be suitable for discharge.    Julio Cesar Talley MD  Department of Hospital Medicine  [facility]  "

## 2019-12-11 LAB
BACTERIA SPEC AEROBE CULT: ABNORMAL
GRAM STN SPEC: ABNORMAL

## 2019-12-11 NOTE — PHYSICIAN QUERY
PT Name: Myesha Quinones  MR #: [id number]    Physician Query Form - Respiratory Condition Clarification      CDS: Luisana Powers RN, CCDS         Contact information :ext [phone])  [email or web address]       This form is a permanent document in the medical record.    Query Date: December 11, 2019    By submitting this query, we are merely seeking further clarification of documentation. Please utilize your independent clinical judgment when addressing the question(s) below.    The Medical record contains the following   Indicators   Supporting Clinical Findings Location in Medical Record   x   SOB, PELAYO, Wheezing, Productive Cough, Use of Accessory Muscles, etc. Positive for cough and shortness of breath.  She has wheezes ED provider note 12/7/19    H&P 12/8/19   x   Acute/Chronic Illness admitted to Hospital Medicine for Sepsis 2/2 RLL CAP   Asthma, chronic  H&P 12/8/19   x   Radiology Findings CXR showed right lung base focal opacity    x   Respiratory Distress or Failure acute hypoxic respiratory failure. H&P 12/8/19      Hypoxia or Hypercapnia     x   RR         ABGs         O2 sat spO2 94% on RA  PA aware of pt O2 saturation 92.    RR 16-25  O2 Sat 92% H&P  12/7819  ED RN note 12/7/19    VS record 12/7/19      BiPAP/Intubation     x   Supplemental O2  Pt placed on 1L o2 to keep pt above spo2 93 ED RN note 12/7/19      Home O2, Oxygen Dependence      x   Treatment start abx with azithro/ceftriaxone for empiric coverage of CAP and UTI  - duonebs q4h scheduled  - breo daily  - albuterol prn  - chest PT BID   H&P 12/8/19      Other       Respiratory failure can be acute, chronic or both. It is generally further specified as hypoxic, hypercapnic or both. Lastly, it is important to identify an etiology, if known or suspected.   References::  https://www.acphospitalist.org/archives/2013/10/coding.htm http://[employer]/acute-respiratory-failure-know   The clinical guidelines noted below are only system  guidelines, and do not replace the providers clinical judgment.      Provider, please specify diagnosis or diagnoses associated with above clinical findings.   Please clarify acute hypoxic respiratory failure diagnosis.    [ x  ] Acute Respiratory Insufficiency - Generally describes less severe respiratory symptoms and measurements (pO2, SpO2, pH, and pCO2) NOT meeting criteria for respiratory failure     [   ] Hypoxia only   [   ] Acute Respiratory Failure with Hypoxia - ABG pO2 < 60 mmHg or O2 sat of 88% on RA and respiratory symptoms documented, please document clinical indicators to support the diagnosis, ________________________   [   ] Respiratory Failure ruled out    [   ] Other Respiratory Diagnosis (please specify): _________________________________   [   ]  Clinically Undetermined       Please document in your progress notes daily for the duration of treatment until resolved and include in your discharge summary.

## 2019-12-11 NOTE — PHYSICIAN QUERY
"PT Name: Myesha Quinones  MR #: [id number]    Physician Query Form - Hematology Clarification      CDS: Luisana Powers RN, CCDS         Contact information :ext [phone])  [email or web address]       This form is a permanent document in the medical record.      Query Date: December 11, 2019    By submitting this query, we are merely seeking further clarification of documentation. Please utilize your independent clinical judgment when addressing the question(s) below.    The Medical record contains the following:   Indicators  Supporting Clinical Findings Location in Medical Record   x "Anemia" documented PMH    Iron deficiency anemia   PMH Vitamin B12 deficiency   H&P 12/7/19   x H & H = H/H 9.8/31.1  H/H 9.3/30.1 Lab 12/7-12/9/19    BP =                     HR=      "GI bleeding" documented      Acute bleeding (Non GI site)      Transfusion(s)     x Treatment: CYANOCOBALAMIN, VITAMIN B-12, (B-12 DOTS ORAL) Home meds 12/7/19   x Other:  HTN, Asthma, Hx of Breast CA s/p R Mastectomy with RUE Lymphedema, and HLD admitted to Hospital Medicine for Sepsis 2/2 RLL CAP (qSOFA: 2) and acute hypoxic respiratory failure. H&P 12/7/19     Provider, please specify diagnosis or diagnoses associated with above clinical findings.    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                                                                                                      "

## 2019-12-11 NOTE — PLAN OF CARE
Patient discharged with [facility].    Future Appointments   Date Time Provider Department Center   12/12/2019 10:00 AM ROBERTO Carter [facility] ENT [facility]   12/16/2019 10:00 AM Emelina Gaston MD [facility] IM [address] [facility]   12/17/2019 11:20 AM Brittany Metcalf MD [facility] MARIAM [facility]   1/2/2020  8:10 AM CARRIE Arechiga MD [facility] OPHTHAL [place]   1/8/2020  9:30 AM Jose Luis Fernando MD [facility] CARDIO [address]   1/17/2020  8:00 AM [facility] LAB [place]   1/17/2020  8:15 AM SPECIMEN, JOSEP LARA SPECLAB [place]   1/20/2020  8:00 AM Emelina Gaston MD [facility] IM [address] PCW   2/5/2020 10:20 AM Chinedu Castillo MD [facility] NEPHRO [address]   2/13/2020  8:00 AM LAB, JANES KENH LAB [place]   2/18/2020 10:00 AM HOME MONITOR DEVICE CHECK, [facility] ARRHPRO [address]   2/20/2020 10:00 AM Veronica Nick NP [facility] ENDODIA [address]   3/11/2020  1:30 PM Mary Magaña DPM [facility] PODIAT [facility]        12/11/19 0848   Final Note   Assessment Type Final Discharge Note   Anticipated Discharge Disposition Home-Health   Right Care Referral Info   Post Acute Recommendation Home-care   Referral Type Home Health   Facility Name [facility]

## 2019-12-11 NOTE — PHYSICIAN QUERY
PT Name: Myesha Quinones  MR #: [id number]    Physician Query Form - Asthma Clarification      CDS: Luisana Powers RN, CCDS         Contact information :ext [phone])  [email or web address]     This form is a permanent document in the medical record.    Query Date: December 11, 2019    By submitting this query, we are merely seeking further clarification of documentation. Please utilize your independent clinical judgment when addressing the question(s) below.    The Medical Record contains the following:     Indicators Supporting Clinical Findings Location in Medical Record   x Asthma or Reactive Airway Disease documented asthma H&P 12/8/19   x Acute/Chronic Illness admitted to Hospital Medicine for Sepsis 2/2 RLL CAP   Asthma, chronic  H&P 12/8/19    Radiology Findings     x RR     Blood Gases     O2 sats RR 16-25  O2 Sat 92% H&P 12/8/19   x BiPAP/Intubation/Supplemental O2  Pt placed on 1L o2 to keep pt above spo2 93 ED RN note 12/7/19   x SOB, Wheezing, Productive Cough, Use of Accessory Muscles, Respiratory Distress, Hypoxia, etc. Positive for cough and shortness of breath.  She has wheezes H&P 12/8/19   x Treatment - duonebs q4h scheduled  - breo daily  - albuterol prn  - chest PT BID H&P 12/8/19   x Other fluticasone (FLONASE) 50 mcg/actuation nasal spray  fluticasone-salmeterol diskus inhaler 250-50 mcg Home meds per H&P 12/8/19     Provider, please specify diagnosis or diagnoses associated with above clinical findings.  Please specify asthma diagnosis  [   ] Mild intermittent asthma, uncomplicated   [ x  ] Mild intermittent asthma, with acute exacerbation   [   ] Mild persistent asthma, uncomplicated   [   ] Mild persistent asthma, with acute exacerbation   [   ] Moderate persistent asthma, uncomplicated   [   ] Moderate persistent asthma, with acute exacerbation   [   ] Severe persistent asthma, uncomplicated   [   ] Severe persistent asthma, with acute exacerbation   [   ] Other asthma type  (please specify): ___   [   ]  Clinically Undetermined       Please document in your progress notes daily for the duration of treatment until resolved and include in your discharge summary.

## 2019-12-12 LAB
BACTERIA BLD CULT: NORMAL
BACTERIA BLD CULT: NORMAL

## 2019-12-12 PROCEDURE — G0180 MD CERTIFICATION HHA PATIENT: HCPCS | Mod: ,,, | Performed by: INTERNAL MEDICINE

## 2019-12-12 PROCEDURE — G0180 PR HOME HEALTH MD CERTIFICATION: ICD-10-PCS | Mod: ,,, | Performed by: INTERNAL MEDICINE

## 2019-12-16 ENCOUNTER — OFFICE VISIT (OUTPATIENT)
Dept: INTERNAL MEDICINE | Facility: CLINIC | Age: 79
DRG: 177 | End: 2019-12-16
Payer: MEDICARE

## 2019-12-16 ENCOUNTER — TELEPHONE (OUTPATIENT)
Dept: INTERNAL MEDICINE | Facility: CLINIC | Age: 79
End: 2019-12-16

## 2019-12-16 ENCOUNTER — HOSPITAL ENCOUNTER (OUTPATIENT)
Dept: RADIOLOGY | Facility: HOSPITAL | Age: 79
Discharge: HOME | DRG: 177 | End: 2019-12-16
Attending: INTERNAL MEDICINE
Payer: MEDICARE

## 2019-12-16 VITALS
BODY MASS INDEX: 26.93 KG/M2 | HEART RATE: 62 BPM | DIASTOLIC BLOOD PRESSURE: 62 MMHG | SYSTOLIC BLOOD PRESSURE: 148 MMHG | WEIGHT: 152 LBS

## 2019-12-16 DIAGNOSIS — R07.81 RIB PAIN ON RIGHT SIDE: ICD-10-CM

## 2019-12-16 DIAGNOSIS — R93.89 ABNORMAL CXR: Primary | ICD-10-CM

## 2019-12-16 DIAGNOSIS — J15.9 BACTERIAL PNEUMONIA: Primary | ICD-10-CM

## 2019-12-16 DIAGNOSIS — R53.83 FATIGUE, UNSPECIFIED TYPE: ICD-10-CM

## 2019-12-16 PROCEDURE — 1159F PR MEDICATION LIST DOCUMENTED IN MEDICAL RECORD: ICD-10-PCS | Mod: HCNC,S$GLB,, | Performed by: INTERNAL MEDICINE

## 2019-12-16 PROCEDURE — 1159F MED LIST DOCD IN RCRD: CPT | Mod: HCNC,S$GLB,, | Performed by: INTERNAL MEDICINE

## 2019-12-16 PROCEDURE — 99214 PR OFFICE/OUTPT VISIT, EST, LEVL IV, 30-39 MIN: ICD-10-PCS | Mod: HCNC,S$GLB,, | Performed by: INTERNAL MEDICINE

## 2019-12-16 PROCEDURE — 3077F PR MOST RECENT SYSTOLIC BLOOD PRESSURE >= 140 MM HG: ICD-10-PCS | Mod: HCNC,CPTII,S$GLB, | Performed by: INTERNAL MEDICINE

## 2019-12-16 PROCEDURE — 1101F PT FALLS ASSESS-DOCD LE1/YR: CPT | Mod: HCNC,CPTII,S$GLB, | Performed by: INTERNAL MEDICINE

## 2019-12-16 PROCEDURE — 3078F PR MOST RECENT DIASTOLIC BLOOD PRESSURE < 80 MM HG: ICD-10-PCS | Mod: HCNC,CPTII,S$GLB, | Performed by: INTERNAL MEDICINE

## 2019-12-16 PROCEDURE — 99999 PR PBB SHADOW E&M-EST. PATIENT-LVL III: ICD-10-PCS | Mod: PBBFAC,HCNC,, | Performed by: INTERNAL MEDICINE

## 2019-12-16 PROCEDURE — 3078F DIAST BP <80 MM HG: CPT | Mod: HCNC,CPTII,S$GLB, | Performed by: INTERNAL MEDICINE

## 2019-12-16 PROCEDURE — 71046 XR CHEST PA AND LATERAL: ICD-10-PCS | Mod: 26,HCNC,, | Performed by: RADIOLOGY

## 2019-12-16 PROCEDURE — 71100 X-RAY EXAM RIBS UNI 2 VIEWS: CPT | Mod: TC,HCNC,RT

## 2019-12-16 PROCEDURE — 99999 PR PBB SHADOW E&M-EST. PATIENT-LVL III: CPT | Mod: PBBFAC,HCNC,, | Performed by: INTERNAL MEDICINE

## 2019-12-16 PROCEDURE — 71046 X-RAY EXAM CHEST 2 VIEWS: CPT | Mod: 26,HCNC,, | Performed by: RADIOLOGY

## 2019-12-16 PROCEDURE — 71046 X-RAY EXAM CHEST 2 VIEWS: CPT | Mod: TC,HCNC

## 2019-12-16 PROCEDURE — 71100 XR RIBS 2 VIEW RIGHT: ICD-10-PCS | Mod: 26,HCNC,RT, | Performed by: RADIOLOGY

## 2019-12-16 PROCEDURE — 99499 UNLISTED E&M SERVICE: CPT | Mod: HCNC,S$GLB,, | Performed by: INTERNAL MEDICINE

## 2019-12-16 PROCEDURE — 1101F PR PT FALLS ASSESS DOC 0-1 FALLS W/OUT INJ PAST YR: ICD-10-PCS | Mod: HCNC,CPTII,S$GLB, | Performed by: INTERNAL MEDICINE

## 2019-12-16 PROCEDURE — 71100 X-RAY EXAM RIBS UNI 2 VIEWS: CPT | Mod: 26,HCNC,RT, | Performed by: RADIOLOGY

## 2019-12-16 PROCEDURE — 99214 OFFICE O/P EST MOD 30 MIN: CPT | Mod: HCNC,S$GLB,, | Performed by: INTERNAL MEDICINE

## 2019-12-16 PROCEDURE — 3077F SYST BP >= 140 MM HG: CPT | Mod: HCNC,CPTII,S$GLB, | Performed by: INTERNAL MEDICINE

## 2019-12-16 PROCEDURE — 99499 RISK ADDL DX/OHS AUDIT: ICD-10-PCS | Mod: HCNC,S$GLB,, | Performed by: INTERNAL MEDICINE

## 2019-12-16 RX ORDER — TRAMADOL HYDROCHLORIDE 50 MG/1
50 TABLET ORAL NIGHTLY PRN
Qty: 7 TABLET | Refills: 0 | Status: SHIPPED | OUTPATIENT
Start: 2019-12-16 | End: 2020-07-27 | Stop reason: ALTCHOICE

## 2019-12-16 RX ORDER — LEVOFLOXACIN 500 MG/1
500 TABLET, FILM COATED ORAL EVERY OTHER DAY
Qty: 4 TABLET | Refills: 0 | Status: ON HOLD | OUTPATIENT
Start: 2019-12-16 | End: 2019-12-21 | Stop reason: SDUPTHER

## 2019-12-16 RX ORDER — DOXYCYCLINE HYCLATE 100 MG
100 TABLET ORAL EVERY 12 HOURS
Qty: 14 TABLET | Refills: 0 | Status: ON HOLD | OUTPATIENT
Start: 2019-12-16 | End: 2019-12-21 | Stop reason: HOSPADM

## 2019-12-16 NOTE — PROGRESS NOTES
Subjective:      Patient ID: Myesha Quinones is a 79 y.o. female.    Chief Complaint: Hospital Follow Up    HPI:  HPI   Patient is here for follow up of a hospitalization for pneumonia:    Patient Name: Myesha Quinones  MRN: [id number]  Admission Date: 12/7/2019  Hospital Length of Stay: 3 days  Discharge Date and Time: 12/10/2019  9:34 AM  Attending Physician: No att. providers found   Discharging Provider: Julio Cesar Talley MD  Primary Care Provider: Emelina Gaston MD  Hospital Medicine Team: [facility] Julio Cesar Talley MD     HPI:   Ms. Myesha Quinones is an 80yo F with hx of HTN, DM, CAD and CHF s/p ICD, asthma, and remote hx of breast cancer as well as BCC who presents to the ED complaining of general weakness. Patient reports onset of back pain, chills, and nausea/vomiting x 4 days on 12/4. On Thurs 12/5, she went to urgent care and was found to have a UTI. She was prescribed macrobid, which she has been taking up until today. Patient has continued to feel poorly with decreased po intake and overall weakness. Yesterday 12/6, patient had a fall while walking between rooms with her  due to weakness. She hit her head on the wall and fell to the floor, but did not have LOC. Patient denies dysuria, hematuria, abdominal pain. Patient endorses chronic cough with sputum production that has recently changed color to dark/greenish. She has not had any difficulty breathing until arriving to the ED. Denies any recent sick contacts. Patient is also complaining of back pain that is worse with movement but not necessarily associated with breathing/inspiration.      On arrival to the ED, patient was febrile with T 102, HR 87, RR 20, /76, spO2 94% on RA. Lactate WNL. UA had 2+ leukocytes, negative nitrites, 2 RBC, 1 WBC, and rare bacteria. CXR showed right lung base focal opacity. CTH was negative for hemorrhage. CT abdo without any acute processes, no hydronephrosis or nephrolithiasis. She was given  tylenol, 1 x dose vanc/zosyn/azithro, and ~2L IVF NS in the ED.     * No surgery found *       Hospital Course:   80 y/o female with HTN, DM, CAD, CHF s/p ICD, asthma who presented with sepsis(fever 102, WBC 15) 2/2 pneumonia (CXR with R base opacity). Started on IV ceftriaxone/azithromycin. RIP negative. Resp cx grew out Pseudomonas - switched to PO levofloxacin. Home antihypertensives were restarted with the exception of chlorthalidone and irbesartan 2/2 VIRGILIO (Cr peaked at 2.2 and was down to baseline of 1.3 on day of discharge). Of note, she did have a fall prior to admission where she hit her back/head - TTP paraspinal area; pain improved over course of hospital stay with lidocaine patch/Flexeril/tramadol.     Discharged home in stable condition on 12/10/19 with home health PT/OT. Will follow-up with PCP for worsening/non-resolving back pain.     * Pseudomonas pneumonia  80 y/o female p/w with general weakness, productive cough, fever 102, WBC 14.8. Had been recently seen in urgent care for UTI and prescribed macrobid. CXR with R lung base focal opacity concerning for PNA.     UA - 1 WBC, 2 RBC, rare bacteria  Blood cx - NGTD  RIP - negative  resp cx - few Pseudomonas     PLAN:  - continue levofloxacin for Pseudomonas PNA     Acute right-sided thoracic back pain  Most likely muscle strain from before coming into hospital      PLAN:  - cyclobenzaprine, tramadol, lidocaine patch     VIRGILIO (acute kidney injury)  CKD III 2/2 DM - baseline Cr 1.2-1.4, with proteinuria     PLAN:  - monitor renal function daily  - avoid nephrotoxins  - renally dose medications     Type 2 diabetes mellitus with stage 3 chronic kidney disease, with long-term current use of insulin  HOME MED = linagliptin, metformin, Lantus 34U QHS  A1c (9/17/19) = 8.4     PLAN:  - continue 3U Aspart with meals  - continue 10U detemir QHS   - LDSSI, POCT glucose AC/HS  - diabetic diet     Essential hypertension  HOME MEDS = carvedilol 50 mg BID, irbesartan  300 mg qday, diltiazem 120 mg qday, chlorthalidone 25 mg qday, hydralazine 100 mg TID, clonidine patch 0.1 mg/24 h     PLAN:  - continue home carvedilol, diltiazem, hydralazine, clonidine  - holding chlorthalidone, irbesartan in setting of VIRGILIO     Coronary artery disease involving native coronary artery without angina pectoris - Non-obstructive 50% LAD  History of CAD and CHF s/p ICD     PLAN:  - start home ASA  - continue home pravastatin     Asthma, chronic  HOME MEDS = fluticasone-salmeterol, Ventolin INH     PLAN:  - continue scheduled Duonebs q4h, albuterol INH PRN  - continue Breo (home Advair not on formulary)  - chest PT BID              Final Active Diagnoses:     Diagnosis Date Noted POA    PRINCIPAL PROBLEM:  Pseudomonas pneumonia [J15.1] 12/07/2019 Yes    Acute right-sided thoracic back pain [M54.6] 12/09/2019 Yes    VIRGILIO (acute kidney injury) [N17.9] 12/07/2019 Yes    Hypomagnesemia [E83.42] 08/31/2017 Yes    Type 2 diabetes mellitus with stage 3 chronic kidney disease, with long-term current use of insulin [E11.22, N18.3, Z79.4] 01/26/2017 Not Applicable    Coronary artery disease involving native coronary artery without angina pectoris - Non-obstructive 50% LAD [I25.10] 07/20/2015 Yes    Essential hypertension [I10] 07/20/2015 Yes    Asthma, chronic [J45.909] 06/05/2013 Yes       Problems Resolved During this Admission:      Patient has completed Levofloxacin , She has home health and PT.     Patient Active Problem List   Diagnosis    History of nonmelanoma skin cancer    Nonischemic cardiomyopathy    LBBB (left bundle branch block)    Nontoxic multinodular goiter    Meningioma    Asthma, chronic    Biventricular ICD (implantable cardioverter-defibrillator) in place    Tremor    Coronary artery disease involving native coronary artery without angina pectoris - Non-obstructive 50% LAD    Essential hypertension    Hyperlipidemia    History of breast cancer    Adrenal cortical nodule:  repeat CT 6/2016    Calcification of aorta    Diabetic polyneuropathy associated with type 2 diabetes mellitus    Osteoporosis    KHOA (obstructive sleep apnea)    Type 2 diabetes mellitus with stage 3 chronic kidney disease, with long-term current use of insulin    Chronic kidney disease, stage 3, mod decreased GFR    Iron deficiency anemia    Chemotherapy-induced neuropathy    Hypomagnesemia    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, with long-term current use of insulin    Hypertensive retinopathy, bilateral    Multiple lung nodules    COPD (chronic obstructive pulmonary disease)    Mixed conductive and sensorineural hearing loss    ICD (implantable cardioverter-defibrillator) battery depletion    Type 2 DM with CKD stage 3 and hypertension    Cardiomyopathy, ischemic    Metabolic bone disease    Proteinuria    Seasonal allergies    Pseudomonas pneumonia    VIRGILIO (acute kidney injury)    Acute right-sided thoracic back pain     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, with long-term current use of insulin 2/22/2018    COPD (chronic obstructive pulmonary disease)     COPD exacerbation 4/8/2018    Defibrillator discharge     Diabetes mellitus     Diabetes mellitus type II     Diabetes with neurologic  complications     Goiter     MNG    HX: breast cancer     Hyperlipidemia     Hypertension     Iron deficiency anemia 5/16/2017    Left kidney mass     Meningioma     Osteoporosis, postmenopausal     Pacemaker     Pneumonia 12/8/2019    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    Skin cancer     s/p excision    Sleep apnea     CPAP    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/2015    mid forehead    Unspecified vitamin D deficiency     Ventricular tachycardia     Vitamin B12 deficiency     Vitamin D deficiency disease      Past Surgical History:   Procedure Laterality Date    BASAL CELL CARCINOMA EXCISION      posterior neck and nose    BREAST BIOPSY      BREAST CYST EXCISION Left     BREAST SURGERY      CARDIAC DEFIBRILLATOR PLACEMENT      x 2    CATARACT EXTRACTION W/  INTRAOCULAR LENS IMPLANT Bilateral     CHOLECYSTECTOMY      COLONOSCOPY N/A 11/5/2019    Procedure: COLONOSCOPY;  Surgeon: Boaz Botello MD;  Location: [facility] ENDO ([address] [place] [address]);  Service: Endoscopy;  Laterality: N/A;  AICD - [employer] -     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    FRACTURE SURGERY      left elbow and wrist as a child    HYSTERECTOMY      MASTECTOMY Right     REPLACEMENT OF IMPLANTABLE CARDIOVERTER-DEFIBRILLATOR (ICD) GENERATOR N/A 12/17/2018    Procedure: REPLACEMENT, ICD GENERATOR;  Surgeon: Jan Mckeon MD;  Location: [facility] EP LAB;  Service: Cardiology;  Laterality: N/A;  DONNA, CRT-D gen change, MDT, MAC, SK, 3 Prep    REVISION OF SKIN POCKET FOR CARDIOVERTER-DEFIBRILLATOR  12/17/2018    Procedure: Revision, Skin Pocket, For Cardioverter-Defibrillator;  Surgeon: Jan Mckeon MD;  Location: [facility] EP LAB;  Service: Cardiology;;    SQUAMOUS CELL CARCINOMA EXCISION      remved from forehead    TONSILLECTOMY       Family History   Problem Relation Age of Onset    Diabetes Father     Heart disease Father     Diabetes Sister     Heart disease Sister     Diabetes Brother     Heart disease Brother      Hypertension Brother     Diabetes Brother     Heart disease Brother     Hypertension Brother     Diabetes Brother     Heart disease Brother     Cancer Brother         colon    Diabetes Brother     Cancer Son         skin    Diabetes Son         prediabetes    Diabetes Daughter         prediabetes    Cancer Daughter         melanoma    Obesity Daughter     Melanoma Daughter     Diabetes Son     Asthma Mother     Hypertension Mother     Stroke Mother     No Known Problems Maternal Grandmother     No Known Problems Maternal Grandfather     No Known Problems Paternal Grandmother     No Known Problems Paternal Grandfather     Amblyopia Neg Hx     Blindness Neg Hx     Cataracts Neg Hx     Glaucoma Neg Hx     Macular degeneration Neg Hx     Retinal detachment Neg Hx     Strabismus Neg Hx     Thyroid disease Neg Hx      Review of Systems   Constitutional: Positive for fatigue. Negative for chills, fever and unexpected weight change.   HENT: Negative for trouble swallowing.    Respiratory: Negative for cough, shortness of breath and wheezing.         Thoracic pain  Patient states she does not feel short of breath   Cardiovascular: Negative for chest pain and palpitations.   Gastrointestinal: Negative for abdominal distention, abdominal pain, blood in stool and vomiting.   Musculoskeletal: Negative for back pain.     Objective:     Vitals:    12/16/19 1044   BP: (!) 148/62   Pulse: 62     There is no height or weight on file to calculate BMI.  Physical Exam   Constitutional: She is oriented to person, place, and time. She appears well-developed and well-nourished. No distress.   Neck: Carotid bruit is not present. No thyromegaly present.   Cardiovascular: Normal rate, regular rhythm and normal heart sounds. PMI is not displaced.   Pulmonary/Chest: Effort normal. No respiratory distress.   Decrease breath sound right lower lobe  Tenderness over the right rib   Abdominal: Soft. Bowel sounds are  normal. She exhibits no distension. There is no tenderness.   Musculoskeletal: She exhibits no edema.   Neurological: She is alert and oriented to person, place, and time.     Assessment:     1. Rib pain on right side    2. Bacterial pneumonia    3. Fatigue, unspecified type      Plan:   Myesha was seen today for hospital follow up.    Diagnoses and all orders for this visit:    Rib pain on right side  -     X-Ray Chest PA And Lateral; Future  -     X-Ray Ribs 2 View Right; Future    Bacterial pneumonia  -     CBC auto differential; Future  -     Comprehensive metabolic panel; Future    Fatigue, unspecified type  -     CBC auto differential; Future  -     Comprehensive metabolic panel; Future        Problem List Items Addressed This Visit     None      Visit Diagnoses     Rib pain on right side    -  Primary    Relevant Orders    X-Ray Chest PA And Lateral    X-Ray Ribs 2 View Right    Bacterial pneumonia        Relevant Orders    CBC auto differential    Comprehensive metabolic panel    Fatigue, unspecified type        Relevant Orders    CBC auto differential    Comprehensive metabolic panel        Orders Placed This Encounter   Procedures    X-Ray Chest PA And Lateral     Standing Status:   Future     Standing Expiration Date:   2/14/2020    X-Ray Ribs 2 View Right     Standing Status:   Future     Standing Expiration Date:   12/16/2020     Order Specific Question:   May the Radiologist modify the order per protocol to meet the clinical needs of the patient?     Answer:   Yes    CBC auto differential     Standing Status:   Future     Standing Expiration Date:   2/14/2020    Comprehensive metabolic panel     Standing Status:   Future     Standing Expiration Date:   2/14/2020     Follow up in about 2 weeks (around 12/30/2019) for Follow up.     Medication List           Accurate as of December 16, 2019 10:47 AM. If you have any questions, ask your nurse or doctor.               CHANGE how you take these  medications    blood sugar diagnostic Strp  Commonly known as:  Accu-Chek Angelica  Uses Accu-Check Angelica meter to test BG 4x/day  What changed:  additional instructions     cloNIDine 0.1 mg/24 hr td ptwk 0.1 mg/24 hr  Commonly known as:  CATAPRES  Place 1 patch onto the skin every 7 days.  What changed:  additional instructions     lancets Misc  Commonly known as:  Accu-Chek Softclix Lancets  Uses Accu-Chek Angelica meter to test BG 4x/day  What changed:  additional instructions        CONTINUE taking these medications    acetaminophen 500 MG tablet  Commonly known as:  TYLENOL     azelastine 137 mcg (0.1 %) nasal spray  Commonly known as:  ASTELIN  1 spray (137 mcg total) by Nasal route 2 (two) times daily. For severe allergy     B-12 DOTS ORAL     benzonatate 100 MG capsule  Commonly known as:  Tessalon Perles  Take 2 capsules (200 mg total) by mouth 3 (three) times daily as needed for Cough.     carvedilol 25 MG tablet  Commonly known as:  COREG  Take 1 tablet (25 mg total) by mouth 2 (two) times daily with meals.     chlorthalidone 25 MG Tab  Commonly known as:  HYGROTEN  Take 1 tablet (25 mg total) by mouth once daily.     diltiaZEM 120 MG Cp24  Commonly known as:  CARDIZEM CD  Take 1 capsule (120 mg total) by mouth once daily.     Enteric Coated Aspirin 81 MG EC tablet  Generic drug:  aspirin     Fish Oil 500 mg Cap  Generic drug:  omega-3 fatty acids     fluticasone propionate 50 mcg/actuation nasal spray  Commonly known as:  FLONASE  2 sprays (100 mcg total) by Each Nare route once daily.     hydrALAZINE 100 MG tablet  Commonly known as:  APRESOLINE  Take 1 tablet (100 mg total) by mouth 3 (three) times daily.     insulin glargine 100 units/mL (3mL) SubQ pen  Commonly known as:  Lantus Solostar U-100 Insulin  Inject 34 Units into the skin every evening.     irbesartan 300 MG tablet  Commonly known as:  AVAPRO  Take 1 tablet (300 mg total) by mouth every evening.     levocetirizine 5 MG tablet  Commonly known as:   "XYZAL  Take 1 tablet (5 mg total) by mouth every evening.     lidocaine 5 %  Commonly known as:  LIDODERM  Place 1 patch onto the skin once daily. Place patch to right back. Leave on for 12 hours and remove for 12 hours.     linaGLIPtin 5 mg Tab tablet  Commonly known as:  Tradjenta  Take 1 tablet (5 mg total) by mouth once daily.     magnesium oxide 400 mg (241.3 mg magnesium) tablet  Commonly known as:  MAG-OX  Take 1 tablet (400 mg total) by mouth once daily.     metFORMIN 500 MG tablet  Commonly known as:  GLUCOPHAGE  Take 1 tablet (500 mg total) by mouth 2 (two) times daily with meals.     nitroGLYCERIN 0.4 MG SL tablet  Commonly known as:  NITROSTAT     ondansetron 4 MG Tbdl  Commonly known as:  ZOFRAN-ODT  Take 1 tablet (4 mg total) by mouth every 6 (six) hours as needed.     pen needle, diabetic 31 gauge x 3/16" Ndle  Commonly known as:  BD Ultra-Fine Mini Pen Needle  USE WITH LANTUS AT BEDTIME     pravastatin 40 MG tablet  Commonly known as:  PRAVACHOL  Take 1 tablet (40 mg total) by mouth once daily.     traMADol 50 mg tablet  Commonly known as:  ULTRAM  Take 1 tablet (50 mg total) by mouth every 12 (twelve) hours as needed for Pain.     Ventolin HFA 90 mcg/actuation inhaler  Generic drug:  albuterol  INHALE 2 PUFFS INTO THE LUNGS EVERY 6 (SIX) HOURS AS NEEDED FOR WHEEZING. RESCUE     Vitamin D3 2,000 unit Tab  Generic drug:  cholecalciferol (vitamin D3)     Wixela Inhub 250-50 mcg/dose diskus inhaler  Generic drug:  fluticasone-salmeterol 250-50 mcg/dose  Inhale 1 puff into the lungs 2 (two) times daily. This is a change from one month            "

## 2019-12-16 NOTE — TELEPHONE ENCOUNTER
I called and spoke to the patient and informed her of the findings and need for a CT Scan without contrast. She declined ER and will do CT tomorrow. I called and made the appt to make sure it is done.    CT scheduled 8am Tues and patient notified. LENNY

## 2019-12-16 NOTE — TELEPHONE ENCOUNTER
Called pt informed of results pt verbally stated understanding of results      appt scheduled for follow up 12/12/2019 9:30pm         NETTA

## 2019-12-16 NOTE — TELEPHONE ENCOUNTER
Based on elevated wbc will continue the levofloxacin 500 mg every other day for one week     Add doxycycline 100 mg every 12 hours for 7 days with food and water.    Discussed with the patient that she must  the medication this evening. She declined returning to the ER. GML      Please call and see if she is tolerating the medication and make an appt for follow up in one week. GML

## 2019-12-17 ENCOUNTER — TELEPHONE (OUTPATIENT)
Dept: INTERNAL MEDICINE | Facility: CLINIC | Age: 79
End: 2019-12-17

## 2019-12-17 ENCOUNTER — HOSPITAL ENCOUNTER (INPATIENT)
Facility: HOSPITAL | Age: 79
LOS: 4 days | Discharge: HOME | DRG: 177 | End: 2019-12-21
Attending: EMERGENCY MEDICINE | Admitting: EMERGENCY MEDICINE
Payer: MEDICARE

## 2019-12-17 ENCOUNTER — PATIENT OUTREACH (OUTPATIENT)
Dept: OTHER | Facility: OTHER | Age: 79
End: 2019-12-17

## 2019-12-17 ENCOUNTER — HOSPITAL ENCOUNTER (OUTPATIENT)
Dept: RADIOLOGY | Facility: HOSPITAL | Age: 79
Discharge: HOME | DRG: 177 | End: 2019-12-17
Attending: INTERNAL MEDICINE
Payer: MEDICARE

## 2019-12-17 DIAGNOSIS — R06.02 SOB (SHORTNESS OF BREATH): ICD-10-CM

## 2019-12-17 DIAGNOSIS — J15.9 BACTERIAL PNEUMONIA: Primary | ICD-10-CM

## 2019-12-17 DIAGNOSIS — E11.42 DIABETIC POLYNEUROPATHY ASSOCIATED WITH TYPE 2 DIABETES MELLITUS: ICD-10-CM

## 2019-12-17 DIAGNOSIS — J91.8 PARAPNEUMONIC EFFUSION: ICD-10-CM

## 2019-12-17 DIAGNOSIS — Z09 HOSPITAL DISCHARGE FOLLOW-UP: ICD-10-CM

## 2019-12-17 DIAGNOSIS — R07.9 CHEST PAIN: ICD-10-CM

## 2019-12-17 DIAGNOSIS — R93.89 ABNORMAL CXR: ICD-10-CM

## 2019-12-17 DIAGNOSIS — J18.9 PARAPNEUMONIC EFFUSION: ICD-10-CM

## 2019-12-17 DIAGNOSIS — J18.9 HCAP (HEALTHCARE-ASSOCIATED PNEUMONIA): Primary | ICD-10-CM

## 2019-12-17 DIAGNOSIS — J15.9 BACTERIAL PNEUMONIA: ICD-10-CM

## 2019-12-17 DIAGNOSIS — I10 ESSENTIAL HYPERTENSION: ICD-10-CM

## 2019-12-17 DIAGNOSIS — R09.02 HYPOXIA: ICD-10-CM

## 2019-12-17 DIAGNOSIS — J15.1: ICD-10-CM

## 2019-12-17 PROBLEM — S22.39XA RIB FRACTURE: Status: ACTIVE | Noted: 2019-12-17

## 2019-12-17 LAB
ALBUMIN SERPL BCP-MCNC: 2.3 G/DL (ref 3.5–5.2)
ALLENS TEST: ABNORMAL
ALP SERPL-CCNC: 116 U/L (ref 55–135)
ALT SERPL W/O P-5'-P-CCNC: 23 U/L (ref 10–44)
ANION GAP SERPL CALC-SCNC: 10 MMOL/L (ref 8–16)
AST SERPL-CCNC: 31 U/L (ref 10–40)
BASOPHILS # BLD AUTO: 0.12 K/UL (ref 0–0.2)
BASOPHILS NFR BLD: 0.7 % (ref 0–1.9)
BILIRUB SERPL-MCNC: 0.4 MG/DL (ref 0.1–1)
BNP SERPL-MCNC: 156 PG/ML (ref 0–99)
BUN SERPL-MCNC: 25 MG/DL (ref 8–23)
CALCIUM SERPL-MCNC: 9.4 MG/DL (ref 8.7–10.5)
CHLORIDE SERPL-SCNC: 97 MMOL/L (ref 95–110)
CO2 SERPL-SCNC: 25 MMOL/L (ref 23–29)
CREAT SERPL-MCNC: 1.4 MG/DL (ref 0.5–1.4)
DELSYS: ABNORMAL
DIFFERENTIAL METHOD: ABNORMAL
EOSINOPHIL # BLD AUTO: 0 K/UL (ref 0–0.5)
EOSINOPHIL NFR BLD: 0.1 % (ref 0–8)
ERYTHROCYTE [DISTWIDTH] IN BLOOD BY AUTOMATED COUNT: 12.6 % (ref 11.5–14.5)
EST. GFR  (AFRICAN AMERICAN): 40.9 ML/MIN/1.73 M^2
EST. GFR  (NON AFRICAN AMERICAN): 35.5 ML/MIN/1.73 M^2
GLUCOSE SERPL-MCNC: 122 MG/DL (ref 70–110)
HCO3 UR-SCNC: 26.3 MMOL/L (ref 24–28)
HCT VFR BLD AUTO: 29.4 % (ref 37–48.5)
HGB BLD-MCNC: 9.2 G/DL (ref 12–16)
IMM GRANULOCYTES # BLD AUTO: 0.47 K/UL (ref 0–0.04)
IMM GRANULOCYTES NFR BLD AUTO: 2.8 % (ref 0–0.5)
INFLUENZA A, MOLECULAR: NEGATIVE
INFLUENZA B, MOLECULAR: NEGATIVE
LACTATE SERPL-SCNC: 1.5 MMOL/L (ref 0.5–2.2)
LYMPHOCYTES # BLD AUTO: 1.3 K/UL (ref 1–4.8)
LYMPHOCYTES NFR BLD: 7.9 % (ref 18–48)
MAGNESIUM SERPL-MCNC: 1.8 MG/DL (ref 1.6–2.6)
MCH RBC QN AUTO: 29.7 PG (ref 27–31)
MCHC RBC AUTO-ENTMCNC: 31.3 G/DL (ref 32–36)
MCV RBC AUTO: 95 FL (ref 82–98)
MODE: ABNORMAL
MONOCYTES # BLD AUTO: 1.3 K/UL (ref 0.3–1)
MONOCYTES NFR BLD: 7.7 % (ref 4–15)
NEUTROPHILS # BLD AUTO: 13.8 K/UL (ref 1.8–7.7)
NEUTROPHILS NFR BLD: 80.8 % (ref 38–73)
NRBC BLD-RTO: 0 /100 WBC
PCO2 BLDA: 40.8 MMHG (ref 35–45)
PH SMN: 7.42 [PH] (ref 7.35–7.45)
PHOSPHATE SERPL-MCNC: 3.6 MG/DL (ref 2.7–4.5)
PLATELET # BLD AUTO: 451 K/UL (ref 150–350)
PMV BLD AUTO: 10.3 FL (ref 9.2–12.9)
PO2 BLDA: 47 MMHG (ref 40–60)
POC BE: 2 MMOL/L
POC SATURATED O2: 83 % (ref 95–100)
POC TCO2: 28 MMOL/L (ref 24–29)
POTASSIUM SERPL-SCNC: 4.3 MMOL/L (ref 3.5–5.1)
PROCALCITONIN SERPL IA-MCNC: 0.08 NG/ML
PROT SERPL-MCNC: 6.9 G/DL (ref 6–8.4)
RBC # BLD AUTO: 3.1 M/UL (ref 4–5.4)
SAMPLE: ABNORMAL
SITE: ABNORMAL
SODIUM SERPL-SCNC: 132 MMOL/L (ref 136–145)
SPECIMEN SOURCE: NORMAL
TROPONIN I SERPL DL<=0.01 NG/ML-MCNC: 0.02 NG/ML (ref 0–0.03)
WBC # BLD AUTO: 17.06 K/UL (ref 3.9–12.7)

## 2019-12-17 PROCEDURE — 96365 THER/PROPH/DIAG IV INF INIT: CPT | Mod: HCNC

## 2019-12-17 PROCEDURE — 99285 EMERGENCY DEPT VISIT HI MDM: CPT | Mod: 25,HCNC

## 2019-12-17 PROCEDURE — 83880 ASSAY OF NATRIURETIC PEPTIDE: CPT | Mod: HCNC

## 2019-12-17 PROCEDURE — 99223 1ST HOSP IP/OBS HIGH 75: CPT | Mod: HCNC,AI,GC, | Performed by: HOSPITALIST

## 2019-12-17 PROCEDURE — 25000242 PHARM REV CODE 250 ALT 637 W/ HCPCS: Mod: HCNC | Performed by: EMERGENCY MEDICINE

## 2019-12-17 PROCEDURE — 87040 BLOOD CULTURE FOR BACTERIA: CPT | Mod: HCNC

## 2019-12-17 PROCEDURE — 83605 ASSAY OF LACTIC ACID: CPT | Mod: HCNC

## 2019-12-17 PROCEDURE — 71250 CT CHEST WITHOUT CONTRAST: ICD-10-PCS | Mod: 26,HCNC,, | Performed by: RADIOLOGY

## 2019-12-17 PROCEDURE — 99285 EMERGENCY DEPT VISIT HI MDM: CPT | Mod: HCNC,,, | Performed by: EMERGENCY MEDICINE

## 2019-12-17 PROCEDURE — 63600175 PHARM REV CODE 636 W HCPCS: Mod: HCNC | Performed by: STUDENT IN AN ORGANIZED HEALTH CARE EDUCATION/TRAINING PROGRAM

## 2019-12-17 PROCEDURE — 63600175 PHARM REV CODE 636 W HCPCS: Mod: HCNC | Performed by: EMERGENCY MEDICINE

## 2019-12-17 PROCEDURE — C9399 UNCLASSIFIED DRUGS OR BIOLOG: HCPCS | Mod: HCNC | Performed by: STUDENT IN AN ORGANIZED HEALTH CARE EDUCATION/TRAINING PROGRAM

## 2019-12-17 PROCEDURE — 80053 COMPREHEN METABOLIC PANEL: CPT | Mod: HCNC

## 2019-12-17 PROCEDURE — 11000001 HC ACUTE MED/SURG PRIVATE ROOM: Mod: HCNC

## 2019-12-17 PROCEDURE — 85025 COMPLETE CBC W/AUTO DIFF WBC: CPT | Mod: HCNC

## 2019-12-17 PROCEDURE — S0030 INJECTION, METRONIDAZOLE: HCPCS | Mod: HCNC | Performed by: STUDENT IN AN ORGANIZED HEALTH CARE EDUCATION/TRAINING PROGRAM

## 2019-12-17 PROCEDURE — 94640 AIRWAY INHALATION TREATMENT: CPT | Mod: HCNC

## 2019-12-17 PROCEDURE — 84100 ASSAY OF PHOSPHORUS: CPT | Mod: HCNC

## 2019-12-17 PROCEDURE — 87502 INFLUENZA DNA AMP PROBE: CPT | Mod: HCNC

## 2019-12-17 PROCEDURE — 71250 CT THORAX DX C-: CPT | Mod: 26,HCNC,, | Performed by: RADIOLOGY

## 2019-12-17 PROCEDURE — 99223 PR INITIAL HOSPITAL CARE,LEVL III: ICD-10-PCS | Mod: HCNC,AI,GC, | Performed by: HOSPITALIST

## 2019-12-17 PROCEDURE — 25000003 PHARM REV CODE 250: Mod: HCNC | Performed by: STUDENT IN AN ORGANIZED HEALTH CARE EDUCATION/TRAINING PROGRAM

## 2019-12-17 PROCEDURE — 94760 N-INVAS EAR/PLS OXIMETRY 1: CPT | Mod: HCNC

## 2019-12-17 PROCEDURE — 71250 CT THORAX DX C-: CPT | Mod: TC,HCNC

## 2019-12-17 PROCEDURE — 84145 PROCALCITONIN (PCT): CPT | Mod: HCNC

## 2019-12-17 PROCEDURE — 84484 ASSAY OF TROPONIN QUANT: CPT | Mod: HCNC

## 2019-12-17 PROCEDURE — 99285 PR EMERGENCY DEPT VISIT,LEVEL V: ICD-10-PCS | Mod: HCNC,,, | Performed by: EMERGENCY MEDICINE

## 2019-12-17 PROCEDURE — 99900035 HC TECH TIME PER 15 MIN (STAT): Mod: HCNC

## 2019-12-17 PROCEDURE — 82803 BLOOD GASES ANY COMBINATION: CPT | Mod: HCNC

## 2019-12-17 PROCEDURE — 83735 ASSAY OF MAGNESIUM: CPT | Mod: HCNC

## 2019-12-17 RX ORDER — FUROSEMIDE 10 MG/ML
40 INJECTION INTRAMUSCULAR; INTRAVENOUS ONCE
Status: COMPLETED | OUTPATIENT
Start: 2019-12-18 | End: 2019-12-18

## 2019-12-17 RX ORDER — DILTIAZEM HYDROCHLORIDE 120 MG/1
120 CAPSULE, COATED, EXTENDED RELEASE ORAL DAILY
Status: DISCONTINUED | OUTPATIENT
Start: 2019-12-18 | End: 2019-12-21 | Stop reason: HOSPADM

## 2019-12-17 RX ORDER — SODIUM CHLORIDE 0.9 % (FLUSH) 0.9 %
10 SYRINGE (ML) INJECTION
Status: DISCONTINUED | OUTPATIENT
Start: 2019-12-17 | End: 2019-12-21 | Stop reason: HOSPADM

## 2019-12-17 RX ORDER — LIDOCAINE 50 MG/G
1 PATCH TOPICAL
Status: DISCONTINUED | OUTPATIENT
Start: 2019-12-17 | End: 2019-12-21 | Stop reason: HOSPADM

## 2019-12-17 RX ORDER — IRBESARTAN 300 MG/1
300 TABLET ORAL NIGHTLY
Status: DISCONTINUED | OUTPATIENT
Start: 2019-12-17 | End: 2019-12-21 | Stop reason: HOSPADM

## 2019-12-17 RX ORDER — GLUCAGON 1 MG
1 KIT INJECTION
Status: DISCONTINUED | OUTPATIENT
Start: 2019-12-17 | End: 2019-12-21 | Stop reason: HOSPADM

## 2019-12-17 RX ORDER — ONDANSETRON 8 MG/1
8 TABLET, ORALLY DISINTEGRATING ORAL EVERY 8 HOURS PRN
Status: DISCONTINUED | OUTPATIENT
Start: 2019-12-17 | End: 2019-12-21 | Stop reason: HOSPADM

## 2019-12-17 RX ORDER — ENOXAPARIN SODIUM 100 MG/ML
40 INJECTION SUBCUTANEOUS EVERY 24 HOURS
Status: DISCONTINUED | OUTPATIENT
Start: 2019-12-17 | End: 2019-12-18

## 2019-12-17 RX ORDER — CEFEPIME HYDROCHLORIDE 2 G/1
2 INJECTION, POWDER, FOR SOLUTION INTRAVENOUS
Status: DISCONTINUED | OUTPATIENT
Start: 2019-12-17 | End: 2019-12-18

## 2019-12-17 RX ORDER — CEFEPIME HYDROCHLORIDE 1 G/1
1 INJECTION, POWDER, FOR SOLUTION INTRAMUSCULAR; INTRAVENOUS
Status: DISCONTINUED | OUTPATIENT
Start: 2019-12-17 | End: 2019-12-17

## 2019-12-17 RX ORDER — CHLORTHALIDONE 25 MG/1
25 TABLET ORAL DAILY
Status: DISCONTINUED | OUTPATIENT
Start: 2019-12-18 | End: 2019-12-18

## 2019-12-17 RX ORDER — FLUTICASONE FUROATE AND VILANTEROL 100; 25 UG/1; UG/1
1 POWDER RESPIRATORY (INHALATION) DAILY
Status: DISCONTINUED | OUTPATIENT
Start: 2019-12-18 | End: 2019-12-21 | Stop reason: HOSPADM

## 2019-12-17 RX ORDER — CARVEDILOL 25 MG/1
25 TABLET ORAL 2 TIMES DAILY WITH MEALS
Status: DISCONTINUED | OUTPATIENT
Start: 2019-12-17 | End: 2019-12-21 | Stop reason: HOSPADM

## 2019-12-17 RX ORDER — HYDRALAZINE HYDROCHLORIDE 25 MG/1
100 TABLET, FILM COATED ORAL 3 TIMES DAILY
Status: DISCONTINUED | OUTPATIENT
Start: 2019-12-17 | End: 2019-12-21 | Stop reason: HOSPADM

## 2019-12-17 RX ORDER — IPRATROPIUM BROMIDE AND ALBUTEROL SULFATE 2.5; .5 MG/3ML; MG/3ML
3 SOLUTION RESPIRATORY (INHALATION)
Status: COMPLETED | OUTPATIENT
Start: 2019-12-17 | End: 2019-12-17

## 2019-12-17 RX ORDER — PROMETHAZINE HYDROCHLORIDE 25 MG/1
25 TABLET ORAL EVERY 6 HOURS PRN
Status: DISCONTINUED | OUTPATIENT
Start: 2019-12-17 | End: 2019-12-21 | Stop reason: HOSPADM

## 2019-12-17 RX ORDER — IBUPROFEN 200 MG
24 TABLET ORAL
Status: DISCONTINUED | OUTPATIENT
Start: 2019-12-17 | End: 2019-12-21 | Stop reason: HOSPADM

## 2019-12-17 RX ORDER — PRAVASTATIN SODIUM 10 MG/1
40 TABLET ORAL DAILY
Status: DISCONTINUED | OUTPATIENT
Start: 2019-12-18 | End: 2019-12-21 | Stop reason: HOSPADM

## 2019-12-17 RX ORDER — METRONIDAZOLE 500 MG/100ML
500 INJECTION, SOLUTION INTRAVENOUS
Status: DISCONTINUED | OUTPATIENT
Start: 2019-12-17 | End: 2019-12-18

## 2019-12-17 RX ORDER — ACETAMINOPHEN 500 MG
1000 TABLET ORAL 3 TIMES DAILY
Status: DISCONTINUED | OUTPATIENT
Start: 2019-12-17 | End: 2019-12-21 | Stop reason: HOSPADM

## 2019-12-17 RX ORDER — ASPIRIN 81 MG/1
81 TABLET ORAL DAILY
Status: DISCONTINUED | OUTPATIENT
Start: 2019-12-18 | End: 2019-12-21 | Stop reason: HOSPADM

## 2019-12-17 RX ORDER — IBUPROFEN 200 MG
16 TABLET ORAL
Status: DISCONTINUED | OUTPATIENT
Start: 2019-12-17 | End: 2019-12-21 | Stop reason: HOSPADM

## 2019-12-17 RX ORDER — TALC
6 POWDER (GRAM) TOPICAL NIGHTLY PRN
Status: DISCONTINUED | OUTPATIENT
Start: 2019-12-17 | End: 2019-12-21 | Stop reason: HOSPADM

## 2019-12-17 RX ADMIN — ACETAMINOPHEN 1000 MG: 500 TABLET ORAL at 08:12

## 2019-12-17 RX ADMIN — SODIUM CHLORIDE 500 ML: 0.9 INJECTION, SOLUTION INTRAVENOUS at 03:12

## 2019-12-17 RX ADMIN — IPRATROPIUM BROMIDE AND ALBUTEROL SULFATE 3 ML: .5; 3 SOLUTION RESPIRATORY (INHALATION) at 03:12

## 2019-12-17 RX ADMIN — CARVEDILOL 25 MG: 25 TABLET, FILM COATED ORAL at 08:12

## 2019-12-17 RX ADMIN — HYDRALAZINE HYDROCHLORIDE 100 MG: 25 TABLET, FILM COATED ORAL at 08:12

## 2019-12-17 RX ADMIN — VANCOMYCIN HYDROCHLORIDE 1500 MG: 1.5 INJECTION, POWDER, LYOPHILIZED, FOR SOLUTION INTRAVENOUS at 05:12

## 2019-12-17 RX ADMIN — CEFEPIME 2 G: 2 INJECTION, POWDER, FOR SOLUTION INTRAVENOUS at 11:12

## 2019-12-17 RX ADMIN — METRONIDAZOLE 500 MG: 500 INJECTION, SOLUTION INTRAVENOUS at 09:12

## 2019-12-17 RX ADMIN — Medication 6 MG: at 08:12

## 2019-12-17 RX ADMIN — IRBESARTAN 300 MG: 300 TABLET ORAL at 08:12

## 2019-12-17 RX ADMIN — ENOXAPARIN SODIUM 40 MG: 100 INJECTION SUBCUTANEOUS at 08:12

## 2019-12-17 RX ADMIN — PIPERACILLIN AND TAZOBACTAM 4.5 G: 4; .5 INJECTION, POWDER, FOR SOLUTION INTRAVENOUS at 03:12

## 2019-12-17 RX ADMIN — INSULIN DETEMIR 22 UNITS: 100 INJECTION, SOLUTION SUBCUTANEOUS at 08:12

## 2019-12-17 RX ADMIN — LIDOCAINE 1 PATCH: 50 PATCH TOPICAL at 08:12

## 2019-12-17 NOTE — PROGRESS NOTES
"Digital Medicine: Health  Follow-Up    Patient states, "I have been very sick". Patient reports she had pneumonia. Patient was in the hospital on 12/7/19 and discharged on 12/10/19 Patient states, "I have broken ribs and possible a hole". Patient had a follow-up with her PCP yesterday. Per chart, "patient was sent home in stable condition with home health PT/OT".     The history is provided by the patient and medical records. No  was used.     Follow Up  Follow-up reason(s): reading review        INTERVENTION(S)  encouragement/support, denied resources and denied questions    PLAN  patient verbalizes understanding, await MD intervention and continue monitoring      There are no preventive care reminders to display for this patient.    Last 5 Patient Entered Readings                                      Current 30 Day Average: 136/63     Recent Readings 12/15/2019 12/2/2019 11/22/2019 11/21/2019 11/13/2019    SBP (mmHg) 122 151 124 146 136    DBP (mmHg) 57 69 60 66 58    Pulse 80 74 72 79 69        Screenings    Patient did not want to discuss lifestyle at this time. I will continue to follow-up on lifestyle with patient once she recovers.   "

## 2019-12-17 NOTE — ED NOTES
Rounding on the patient has been done. The patient has been updated on the plan of care and current status. Pain was assessed and is currently a 0/10. Comfort positioning and restroom needs were addressed. Necessary items were placed with in reach and was advised when a reassessment would take place. The call bell remains at the bedside for any additional patient needs. The patient is resting comfortably on the stretcher, respirations are even and unlabored, skin warm and dry. Will continue to monitor. Family at bedside.  Side rails up X 2.

## 2019-12-17 NOTE — ED NOTES
Report called to ADAM Guillermo for [phone]. Tele box, [id number], placed on pt. War room notified. Pt updated on room assignment, verbalizes understanding.

## 2019-12-17 NOTE — TELEPHONE ENCOUNTER
Patient was started on levofloxacin and doxy last night. A CT today showed middle and lower lung consolidation worse than in the hospital. No fever .      will take patient to the ER. GML

## 2019-12-17 NOTE — TELEPHONE ENCOUNTER
Spoke with pelon  from pulmonary. Stated will have to be sent to the doctor's staff to fit her in. The staff will give our office a call once an appt is scheduled. Asked  to make it urgent.

## 2019-12-17 NOTE — ED PROVIDER NOTES
Encounter Date: 12/17/2019       History     Chief Complaint   Patient presents with    Abnormal Ct Scan     guadalupe pneumonia was admitted last week, now elevated wbc     78 yo W with pmhx HTN, DM, CHF s/p AICD, CAD, COPD presents with a chief complaint of shortness of breath. Patient reports worsening shortness of breath for the past week.  It became more severe over the past few hours. Patient was admitted for pneumonia from December 7 to 10th.  She initially presented with concern for sepsis.  Chest x-ray revealed right lower lobe infiltrate.  Respiratory cultures grew Pseudomonas.  Patient completed a course of antibiotics.  Despite this, the shortness of breath has been worsening.  Patient saw her PCP yesterday who obtain outpatient labs that revealed a leukocytosis of 20.65.  Additionally, CT scan of her chest revealed worsening pneumonia.      CT chest without contrast revealed:  1. Interval development of complete consolidation and volume loss of the right middle and lower lobes with a moderate associated pleural effusion.  Findings are most concerning for worsening pneumonia with an adjacent parapneumonic effusion.  While an endobronchial lesion with associated postobstructive consolidation is possible, this is felt to be less likely.  Specialty consultation recommended.  2. Patchy ground-glass attenuation and interlobular septal thickening within the aerated right upper lobe, favored to represent edema.  3. Trace left-sided pleural effusion.  Stable subsegmental atelectasis within the lingula and left lower lobe.  4. Mild mediastinal and cardiophrenic lymphadenopathy, presumably reactive in nature but overall nonspecific.  5. Moderate dense coronary artery atherosclerosis.  Left chest wall biventricular AICD.  6. Displaced fracture of the posterolateral aspect of the right 6th rib.  7. Left adrenal adenoma.  8. Status post right mastectomy.    Given the worsening cough and shortness of breath, patient's PCP  started on a course of Levaquin and doxycycline yesterday.  When CT resulted today, patient was referred to the emergency department.  Patient reports the shortness breath has worsened today.  It is associated with a cough.  This afternoon, patient noticed blood in the phlegm.  Patient reports associated malaise and generalized weakness.        Review of patient's allergies indicates:   Allergen Reactions    Iodine and iodide containing products Hives    Nifedipine      weakness     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, with long-term current use of insulin 2/22/2018    COPD (chronic obstructive pulmonary disease)     COPD exacerbation 4/8/2018    Defibrillator discharge     Diabetes mellitus     Diabetes mellitus type II     Diabetes with neurologic complications     Goiter     MNG    HX: breast cancer     Hyperlipidemia     Hypertension     Iron deficiency anemia 5/16/2017    Left kidney mass     Meningioma     Osteoporosis, postmenopausal     Pacemaker     Pneumonia 12/8/2019    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    Pseudomonas pneumonia     Skin cancer     s/p excision    Sleep apnea     CPAP    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/2015    mid forehead    Unspecified vitamin D deficiency     Ventricular tachycardia     Vitamin B12 deficiency     Vitamin D deficiency  disease      Past Surgical History:   Procedure Laterality Date    BASAL CELL CARCINOMA EXCISION      posterior neck and nose    BREAST BIOPSY      BREAST CYST EXCISION Left     BREAST SURGERY      CARDIAC DEFIBRILLATOR PLACEMENT      x 2    CATARACT EXTRACTION W/  INTRAOCULAR LENS IMPLANT Bilateral     CHOLECYSTECTOMY      COLONOSCOPY N/A 11/5/2019    Procedure: COLONOSCOPY;  Surgeon: Boaz Botello MD;  Location: [facility] ENDO ([address] [place] [address]);  Service: Endoscopy;  Laterality: N/A;  AICD - [employer] - sm    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    FRACTURE SURGERY      left elbow and wrist as a child    HYSTERECTOMY      MASTECTOMY Right     REPLACEMENT OF IMPLANTABLE CARDIOVERTER-DEFIBRILLATOR (ICD) GENERATOR N/A 12/17/2018    Procedure: REPLACEMENT, ICD GENERATOR;  Surgeon: Jan Mckeon MD;  Location: [facility] EP LAB;  Service: Cardiology;  Laterality: N/A;  DONNA, CRT-D gen change, MDT, MAC, SK, 3 Prep    REVISION OF SKIN POCKET FOR CARDIOVERTER-DEFIBRILLATOR  12/17/2018    Procedure: Revision, Skin Pocket, For Cardioverter-Defibrillator;  Surgeon: Jan Mckeon MD;  Location: [facility] EP LAB;  Service: Cardiology;;    SQUAMOUS CELL CARCINOMA EXCISION      remved from forehead    TONSILLECTOMY       Family History   Problem Relation Age of Onset    Diabetes Father     Heart disease Father     Diabetes Sister     Heart disease Sister     Diabetes Brother     Heart disease Brother     Hypertension Brother     Diabetes Brother     Heart disease Brother     Hypertension Brother     Diabetes Brother     Heart disease Brother     Cancer Brother         colon    Diabetes Brother     Cancer Son         skin    Diabetes Son         prediabetes    Diabetes Daughter         prediabetes    Cancer Daughter         melanoma    Obesity Daughter     Melanoma Daughter     Diabetes Son     Asthma Mother     Hypertension Mother     Stroke Mother     No Known Problems Maternal Grandmother     No Known  Problems Maternal Grandfather     No Known Problems Paternal Grandmother     No Known Problems Paternal Grandfather     Amblyopia Neg Hx     Blindness Neg Hx     Cataracts Neg Hx     Glaucoma Neg Hx     Macular degeneration Neg Hx     Retinal detachment Neg Hx     Strabismus Neg Hx     Thyroid disease Neg Hx      Social History     Tobacco Use    Smoking status: Never Smoker    Smokeless tobacco: Never Used   Substance Use Topics    Alcohol use: No     Alcohol/week: 0.0 standard drinks    Drug use: No     Review of Systems   Constitutional: Positive for fatigue. Negative for fever.   HENT: Negative for congestion.    Eyes: Negative for discharge.   Respiratory: Positive for cough and shortness of breath.    Cardiovascular: Negative for chest pain.   Gastrointestinal: Negative for abdominal pain.   Endocrine: Negative for polyuria.   Genitourinary: Negative for dysuria.   Musculoskeletal: Positive for myalgias. Negative for back pain.   Skin: Negative for wound.   Allergic/Immunologic: Negative for immunocompromised state.   Neurological: Negative for headaches.   Hematological: Does not bruise/bleed easily.   Psychiatric/Behavioral: Negative for confusion.       Physical Exam     Initial Vitals [12/17/19 1453]   BP Pulse Resp Temp SpO2   (!) 161/70 89 20 98.5 °F (36.9 °C) (!) 94 %      MAP       --         Physical Exam    Nursing note and vitals reviewed.  Constitutional: She appears well-developed and well-nourished. She is not diaphoretic. No distress.   Somewhat ill-appearing   HENT:   Head: Normocephalic and atraumatic.   Eyes: EOM are normal. Pupils are equal, round, and reactive to light. Right eye exhibits no discharge. Left eye exhibits no discharge. No scleral icterus.   Neck: Normal range of motion. Neck supple. No JVD present.   Cardiovascular: Normal rate, regular rhythm, normal heart sounds and intact distal pulses. Exam reveals no gallop and no friction rub.    No murmur  heard.  Pulmonary/Chest: She has no wheezes. She has rhonchi. She has no rales. She exhibits no tenderness.   Patient is tachypneic with increased work of breathing on room air  There are rhonchi throughout the right lower lung zones   Abdominal: Soft. Bowel sounds are normal. She exhibits no distension and no mass. There is no tenderness. There is no rebound and no guarding.   Musculoskeletal: Normal range of motion. She exhibits no edema or tenderness.   Lymphadenopathy:     She has no cervical adenopathy.   Neurological: She is alert and oriented to person, place, and time. She has normal strength. No sensory deficit.   Skin: Skin is warm and dry. Capillary refill takes less than 2 seconds.   Psychiatric: She has a normal mood and affect.         ED Course   Procedures  Labs Reviewed   B-TYPE NATRIURETIC PEPTIDE - Abnormal; Notable for the following components:       Result Value     (*)     All other components within normal limits   ISTAT PROCEDURE - Abnormal; Notable for the following components:    POC SATURATED O2 83 (*)     All other components within normal limits   CULTURE, BLOOD   CULTURE, BLOOD   INFLUENZA A & B BY MOLECULAR   LACTIC ACID, PLASMA   MAGNESIUM   PHOSPHORUS   PROCALCITONIN   TROPONIN I   CBC W/ AUTO DIFFERENTIAL   COMPREHENSIVE METABOLIC PANEL   LACTIC ACID, PLASMA   CBC W/ AUTO DIFFERENTIAL     EKG Readings: (Independently Interpreted)   Previous EKG: Compared with most recent EKG Rhythm: Paced Rhythm. Heart Rate: 86. ST Segments: Normal ST Segments. T Waves Flipped: AVL and V2. Axis: Left Axis Deviation.       Imaging Results    None          Medical Decision Making:   History:   I obtained history from: someone other than patient.  Old Medical Records: I decided to obtain old medical records.  Initial Assessment:   78 yo W with pmhx HTN, DM, CHF s/p AICD, CAD, COPD presents with a chief complaint of shortness of breath. Patient is tachypneic, ill appearing, with worsening  leukocytosis and right middle and lower lobe pneumonia on CT.  Differential Diagnosis:   Sepsis, pneumonia, malignancy, pleural effusion, heart failure, influenza  Clinical Tests:   Lab Tests: Ordered  Radiological Study: Reviewed  Medical Tests: Ordered  ED Management:  Presentation is consistent with healthcare associated pneumonia.  Will obtain labs and cultures.  Will administer empiric treatment for Hcap with vanc and Zosyn.  Patient will require admission.    Reassessment:  Patient desat to 90%, placed on 2 L nasal cannula.  Patient is ventilating appropriately on VBG.  She is not acidotic.  Procalcitonin normal.  Lactic acid normal. .  Troponin normal. Inpatient per case management.  Will admit.                                   Clinical Impression:       ICD-10-CM ICD-9-CM   1. HCAP (healthcare-associated pneumonia) J18.9 486   2. Hypoxia R09.02 799.02                             Vin Collins MD  12/17/19 1654

## 2019-12-17 NOTE — TELEPHONE ENCOUNTER
Please see if you can get a pulmonary appt for this patient today or tomorrow and let me know. Any time would be fine.     Severe pneumonia  Hospital Discharge 12/10

## 2019-12-17 NOTE — ED NOTES
Discharged this past wedneday after being admitted for pneumonia; had a CT of her chest today and her pneumonia is worse per her PCP.  States she started coughing up blood this morning.  Denies fever/chills, n/v/d.

## 2019-12-18 PROBLEM — J15.1: Status: ACTIVE | Noted: 2019-12-17

## 2019-12-18 PROBLEM — J18.9 PARAPNEUMONIC EFFUSION: Status: ACTIVE | Noted: 2019-12-18

## 2019-12-18 PROBLEM — J91.8 PARAPNEUMONIC EFFUSION: Status: ACTIVE | Noted: 2019-12-18

## 2019-12-18 LAB
ALBUMIN FLD-MCNC: 1.6 G/DL
ALBUMIN SERPL BCP-MCNC: 2 G/DL (ref 3.5–5.2)
ALBUMIN SERPL BCP-MCNC: 2.2 G/DL (ref 3.5–5.2)
ALP SERPL-CCNC: 102 U/L (ref 55–135)
ALP SERPL-CCNC: 96 U/L (ref 55–135)
ALT SERPL W/O P-5'-P-CCNC: 19 U/L (ref 10–44)
ALT SERPL W/O P-5'-P-CCNC: 19 U/L (ref 10–44)
AMYLASE, BODY FLUID: 18 U/L
ANION GAP SERPL CALC-SCNC: 10 MMOL/L (ref 8–16)
ANION GAP SERPL CALC-SCNC: 7 MMOL/L (ref 8–16)
APPEARANCE FLD: NORMAL
AST SERPL-CCNC: 23 U/L (ref 10–40)
AST SERPL-CCNC: 25 U/L (ref 10–40)
BACTERIA SPEC AEROBE CULT: NORMAL
BASOPHILS # BLD AUTO: 0.1 K/UL (ref 0–0.2)
BASOPHILS NFR BLD: 0.9 % (ref 0–1.9)
BILIRUB SERPL-MCNC: 0.4 MG/DL (ref 0.1–1)
BILIRUB SERPL-MCNC: 0.4 MG/DL (ref 0.1–1)
BODY FLD TYPE: NORMAL
BODY FLUID SOURCE AMYLASE: NORMAL
BODY FLUID SOURCE, LDH: NORMAL
BUN SERPL-MCNC: 27 MG/DL (ref 8–23)
BUN SERPL-MCNC: 29 MG/DL (ref 8–23)
CALCIUM SERPL-MCNC: 9 MG/DL (ref 8.7–10.5)
CALCIUM SERPL-MCNC: 9.4 MG/DL (ref 8.7–10.5)
CHLORIDE SERPL-SCNC: 94 MMOL/L (ref 95–110)
CHLORIDE SERPL-SCNC: 98 MMOL/L (ref 95–110)
CO2 SERPL-SCNC: 26 MMOL/L (ref 23–29)
CO2 SERPL-SCNC: 27 MMOL/L (ref 23–29)
COLOR FLD: NORMAL
CREAT SERPL-MCNC: 1.5 MG/DL (ref 0.5–1.4)
CREAT SERPL-MCNC: 1.6 MG/DL (ref 0.5–1.4)
DIFFERENTIAL METHOD: ABNORMAL
EOSINOPHIL # BLD AUTO: 0.2 K/UL (ref 0–0.5)
EOSINOPHIL NFR BLD: 1.3 % (ref 0–8)
ERYTHROCYTE [DISTWIDTH] IN BLOOD BY AUTOMATED COUNT: 12.7 % (ref 11.5–14.5)
EST. GFR  (AFRICAN AMERICAN): 34.8 ML/MIN/1.73 M^2
EST. GFR  (AFRICAN AMERICAN): 37.7 ML/MIN/1.73 M^2
EST. GFR  (NON AFRICAN AMERICAN): 30.2 ML/MIN/1.73 M^2
EST. GFR  (NON AFRICAN AMERICAN): 32.7 ML/MIN/1.73 M^2
ESTIMATED AVG GLUCOSE: 154 MG/DL (ref 68–131)
GLUCOSE FLD-MCNC: 139 MG/DL
GLUCOSE SERPL-MCNC: 167 MG/DL (ref 70–110)
GLUCOSE SERPL-MCNC: 167 MG/DL (ref 70–110)
GLUCOSE SERPL-MCNC: 47 MG/DL (ref 70–110)
GRAM STN SPEC: NORMAL
HBA1C MFR BLD HPLC: 7 % (ref 4–5.6)
HCT VFR BLD AUTO: 27.7 % (ref 37–48.5)
HGB BLD-MCNC: 8.5 G/DL (ref 12–16)
IMM GRANULOCYTES # BLD AUTO: 0.31 K/UL (ref 0–0.04)
IMM GRANULOCYTES NFR BLD AUTO: 2.7 % (ref 0–0.5)
LDH FLD L TO P-CCNC: 283 U/L
LDH SERPL L TO P-CCNC: 269 U/L (ref 110–260)
LYMPHOCYTES # BLD AUTO: 1.3 K/UL (ref 1–4.8)
LYMPHOCYTES NFR BLD: 11.2 % (ref 18–48)
LYMPHOCYTES NFR FLD MANUAL: 17 %
MAGNESIUM SERPL-MCNC: 1.8 MG/DL (ref 1.6–2.6)
MCH RBC QN AUTO: 29.6 PG (ref 27–31)
MCHC RBC AUTO-ENTMCNC: 30.7 G/DL (ref 32–36)
MCV RBC AUTO: 97 FL (ref 82–98)
MONOCYTES # BLD AUTO: 1.5 K/UL (ref 0.3–1)
MONOCYTES NFR BLD: 12.6 % (ref 4–15)
MONOS+MACROS NFR FLD MANUAL: 15 %
NEUTROPHILS # BLD AUTO: 8.3 K/UL (ref 1.8–7.7)
NEUTROPHILS NFR BLD: 71.3 % (ref 38–73)
NEUTROPHILS NFR FLD MANUAL: 68 %
NRBC BLD-RTO: 0 /100 WBC
PHOSPHATE SERPL-MCNC: 4.6 MG/DL (ref 2.7–4.5)
PLATELET # BLD AUTO: 439 K/UL (ref 150–350)
PMV BLD AUTO: 10.4 FL (ref 9.2–12.9)
POCT GLUCOSE: 136 MG/DL (ref 70–110)
POCT GLUCOSE: 139 MG/DL (ref 70–110)
POCT GLUCOSE: 150 MG/DL (ref 70–110)
POCT GLUCOSE: 177 MG/DL (ref 70–110)
POCT GLUCOSE: 193 MG/DL (ref 70–110)
POCT GLUCOSE: 196 MG/DL (ref 70–110)
POCT GLUCOSE: 31 MG/DL (ref 70–110)
POCT GLUCOSE: 33 MG/DL (ref 70–110)
POCT GLUCOSE: 82 MG/DL (ref 70–110)
POTASSIUM SERPL-SCNC: 3.9 MMOL/L (ref 3.5–5.1)
POTASSIUM SERPL-SCNC: 4 MMOL/L (ref 3.5–5.1)
PROT FLD-MCNC: 3.4 G/DL
PROT SERPL-MCNC: 6.3 G/DL (ref 6–8.4)
PROT SERPL-MCNC: 6.8 G/DL (ref 6–8.4)
PROT SERPL-MCNC: 6.8 G/DL (ref 6–8.4)
RBC # BLD AUTO: 2.87 M/UL (ref 4–5.4)
SODIUM SERPL-SCNC: 131 MMOL/L (ref 136–145)
SODIUM SERPL-SCNC: 131 MMOL/L (ref 136–145)
SPECIMEN SOURCE: NORMAL
WBC # BLD AUTO: 11.59 K/UL (ref 3.9–12.7)
WBC # FLD: 1590 /CU MM

## 2019-12-18 PROCEDURE — 94640 AIRWAY INHALATION TREATMENT: CPT | Mod: HCNC

## 2019-12-18 PROCEDURE — 87205 SMEAR GRAM STAIN: CPT | Mod: HCNC

## 2019-12-18 PROCEDURE — 82042 OTHER SOURCE ALBUMIN QUAN EA: CPT | Mod: HCNC

## 2019-12-18 PROCEDURE — 87205 SMEAR GRAM STAIN: CPT | Mod: 59,HCNC

## 2019-12-18 PROCEDURE — S0030 INJECTION, METRONIDAZOLE: HCPCS | Mod: HCNC | Performed by: STUDENT IN AN ORGANIZED HEALTH CARE EDUCATION/TRAINING PROGRAM

## 2019-12-18 PROCEDURE — 87206 SMEAR FLUORESCENT/ACID STAI: CPT | Mod: HCNC

## 2019-12-18 PROCEDURE — 63600175 PHARM REV CODE 636 W HCPCS: Mod: HCNC | Performed by: STUDENT IN AN ORGANIZED HEALTH CARE EDUCATION/TRAINING PROGRAM

## 2019-12-18 PROCEDURE — 32554 ASPIRATE PLEURA W/O IMAGING: CPT | Mod: HCNC,RT,, | Performed by: INTERNAL MEDICINE

## 2019-12-18 PROCEDURE — 88112 CYTOPATH CELL ENHANCE TECH: CPT | Mod: HCNC | Performed by: PATHOLOGY

## 2019-12-18 PROCEDURE — 83615 LACTATE (LD) (LDH) ENZYME: CPT | Mod: HCNC

## 2019-12-18 PROCEDURE — 25000003 PHARM REV CODE 250: Mod: HCNC | Performed by: STUDENT IN AN ORGANIZED HEALTH CARE EDUCATION/TRAINING PROGRAM

## 2019-12-18 PROCEDURE — 87070 CULTURE OTHR SPECIMN AEROBIC: CPT | Mod: 59,HCNC

## 2019-12-18 PROCEDURE — C9399 UNCLASSIFIED DRUGS OR BIOLOG: HCPCS | Mod: HCNC | Performed by: STUDENT IN AN ORGANIZED HEALTH CARE EDUCATION/TRAINING PROGRAM

## 2019-12-18 PROCEDURE — 80053 COMPREHEN METABOLIC PANEL: CPT | Mod: 91,HCNC

## 2019-12-18 PROCEDURE — 84311 SPECTROPHOTOMETRY: CPT | Mod: HCNC

## 2019-12-18 PROCEDURE — 84100 ASSAY OF PHOSPHORUS: CPT | Mod: HCNC

## 2019-12-18 PROCEDURE — 88305 TISSUE EXAM BY PATHOLOGIST: CPT | Mod: HCNC | Performed by: PATHOLOGY

## 2019-12-18 PROCEDURE — 25000242 PHARM REV CODE 250 ALT 637 W/ HCPCS: Mod: HCNC | Performed by: STUDENT IN AN ORGANIZED HEALTH CARE EDUCATION/TRAINING PROGRAM

## 2019-12-18 PROCEDURE — 87116 MYCOBACTERIA CULTURE: CPT | Mod: HCNC

## 2019-12-18 PROCEDURE — 83735 ASSAY OF MAGNESIUM: CPT | Mod: HCNC

## 2019-12-18 PROCEDURE — 36415 COLL VENOUS BLD VENIPUNCTURE: CPT | Mod: HCNC

## 2019-12-18 PROCEDURE — 87070 CULTURE OTHR SPECIMN AEROBIC: CPT | Mod: HCNC

## 2019-12-18 PROCEDURE — 99223 PR INITIAL HOSPITAL CARE,LEVL III: ICD-10-PCS | Mod: HCNC,25,, | Performed by: INTERNAL MEDICINE

## 2019-12-18 PROCEDURE — 87102 FUNGUS ISOLATION CULTURE: CPT | Mod: HCNC

## 2019-12-18 PROCEDURE — 27000221 HC OXYGEN, UP TO 24 HOURS: Mod: HCNC

## 2019-12-18 PROCEDURE — 85025 COMPLETE CBC W/AUTO DIFF WBC: CPT | Mod: HCNC

## 2019-12-18 PROCEDURE — 88112 CYTOPATH CELL ENHANCE TECH: CPT | Mod: 26,HCNC,, | Performed by: PATHOLOGY

## 2019-12-18 PROCEDURE — 25000003 PHARM REV CODE 250: Mod: HCNC | Performed by: HOSPITALIST

## 2019-12-18 PROCEDURE — 32554 PR THORACEN W/O IMAG GUIDANC: ICD-10-PCS | Mod: HCNC,RT,, | Performed by: INTERNAL MEDICINE

## 2019-12-18 PROCEDURE — 94761 N-INVAS EAR/PLS OXIMETRY MLT: CPT | Mod: HCNC

## 2019-12-18 PROCEDURE — 88305 TISSUE EXAM BY PATHOLOGIST: CPT | Mod: 26,HCNC,, | Performed by: PATHOLOGY

## 2019-12-18 PROCEDURE — 82150 ASSAY OF AMYLASE: CPT | Mod: HCNC

## 2019-12-18 PROCEDURE — 83615 LACTATE (LD) (LDH) ENZYME: CPT | Mod: 91,HCNC

## 2019-12-18 PROCEDURE — 89051 BODY FLUID CELL COUNT: CPT | Mod: HCNC

## 2019-12-18 PROCEDURE — 88112 PR  CYTOPATH, CELL ENHANCE TECH: ICD-10-PCS | Mod: 26,HCNC,, | Performed by: PATHOLOGY

## 2019-12-18 PROCEDURE — 82945 GLUCOSE OTHER FLUID: CPT | Mod: HCNC

## 2019-12-18 PROCEDURE — 11000001 HC ACUTE MED/SURG PRIVATE ROOM: Mod: HCNC

## 2019-12-18 PROCEDURE — 83036 HEMOGLOBIN GLYCOSYLATED A1C: CPT | Mod: HCNC

## 2019-12-18 PROCEDURE — 84157 ASSAY OF PROTEIN OTHER: CPT | Mod: HCNC

## 2019-12-18 PROCEDURE — 88305 TISSUE EXAM BY PATHOLOGIST: ICD-10-PCS | Mod: 26,HCNC,, | Performed by: PATHOLOGY

## 2019-12-18 PROCEDURE — 99223 1ST HOSP IP/OBS HIGH 75: CPT | Mod: HCNC,25,, | Performed by: INTERNAL MEDICINE

## 2019-12-18 PROCEDURE — 80053 COMPREHEN METABOLIC PANEL: CPT | Mod: HCNC

## 2019-12-18 RX ORDER — TRAMADOL HYDROCHLORIDE 50 MG/1
50 TABLET ORAL EVERY 8 HOURS PRN
Status: DISCONTINUED | OUTPATIENT
Start: 2019-12-18 | End: 2019-12-21 | Stop reason: HOSPADM

## 2019-12-18 RX ORDER — OXYCODONE HYDROCHLORIDE 5 MG/1
5 TABLET ORAL ONCE
Status: COMPLETED | OUTPATIENT
Start: 2019-12-18 | End: 2019-12-18

## 2019-12-18 RX ORDER — LIDOCAINE HYDROCHLORIDE 10 MG/ML
10 INJECTION INFILTRATION; PERINEURAL ONCE
Status: DISCONTINUED | OUTPATIENT
Start: 2019-12-18 | End: 2019-12-20

## 2019-12-18 RX ORDER — CEFEPIME HYDROCHLORIDE 2 G/1
2 INJECTION, POWDER, FOR SOLUTION INTRAVENOUS
Status: CANCELLED | OUTPATIENT
Start: 2019-12-19

## 2019-12-18 RX ORDER — CEFEPIME HYDROCHLORIDE 2 G/1
2 INJECTION, POWDER, FOR SOLUTION INTRAVENOUS
Status: DISCONTINUED | OUTPATIENT
Start: 2019-12-19 | End: 2019-12-21 | Stop reason: HOSPADM

## 2019-12-18 RX ORDER — NALOXONE HCL 0.4 MG/ML
0.4 VIAL (ML) INJECTION
Status: DISCONTINUED | OUTPATIENT
Start: 2019-12-18 | End: 2019-12-21 | Stop reason: HOSPADM

## 2019-12-18 RX ORDER — INSULIN ASPART 100 [IU]/ML
0-5 INJECTION, SOLUTION INTRAVENOUS; SUBCUTANEOUS
Status: DISCONTINUED | OUTPATIENT
Start: 2019-12-18 | End: 2019-12-21 | Stop reason: HOSPADM

## 2019-12-18 RX ORDER — ALBUTEROL SULFATE 2.5 MG/.5ML
2.5 SOLUTION RESPIRATORY (INHALATION) EVERY 4 HOURS PRN
Status: DISCONTINUED | OUTPATIENT
Start: 2019-12-18 | End: 2019-12-21 | Stop reason: HOSPADM

## 2019-12-18 RX ORDER — CLONIDINE 0.1 MG/24H
1 PATCH, EXTENDED RELEASE TRANSDERMAL
Status: DISCONTINUED | OUTPATIENT
Start: 2019-12-21 | End: 2019-12-21 | Stop reason: HOSPADM

## 2019-12-18 RX ADMIN — ACETAMINOPHEN 1000 MG: 500 TABLET ORAL at 02:12

## 2019-12-18 RX ADMIN — IRBESARTAN 300 MG: 300 TABLET ORAL at 08:12

## 2019-12-18 RX ADMIN — Medication 6 MG: at 08:12

## 2019-12-18 RX ADMIN — CHLORTHALIDONE 25 MG: 25 TABLET ORAL at 11:12

## 2019-12-18 RX ADMIN — HYDRALAZINE HYDROCHLORIDE 100 MG: 25 TABLET, FILM COATED ORAL at 02:12

## 2019-12-18 RX ADMIN — AZITHROMYCIN MONOHYDRATE 500 MG: 500 INJECTION, POWDER, LYOPHILIZED, FOR SOLUTION INTRAVENOUS at 12:12

## 2019-12-18 RX ADMIN — OXYCODONE HYDROCHLORIDE 5 MG: 5 TABLET ORAL at 04:12

## 2019-12-18 RX ADMIN — CARVEDILOL 25 MG: 25 TABLET, FILM COATED ORAL at 05:12

## 2019-12-18 RX ADMIN — DEXTROSE 250 ML: 10 SOLUTION INTRAVENOUS at 07:12

## 2019-12-18 RX ADMIN — LIDOCAINE 1 PATCH: 50 PATCH TOPICAL at 08:12

## 2019-12-18 RX ADMIN — TRAMADOL HYDROCHLORIDE 50 MG: 50 TABLET, FILM COATED ORAL at 04:12

## 2019-12-18 RX ADMIN — FUROSEMIDE 40 MG: 10 INJECTION, SOLUTION INTRAMUSCULAR; INTRAVENOUS at 06:12

## 2019-12-18 RX ADMIN — CEFEPIME 2 G: 2 INJECTION, POWDER, FOR SOLUTION INTRAVENOUS at 10:12

## 2019-12-18 RX ADMIN — DEXTROSE 250 ML: 10 SOLUTION INTRAVENOUS at 09:12

## 2019-12-18 RX ADMIN — HYDRALAZINE HYDROCHLORIDE 100 MG: 25 TABLET, FILM COATED ORAL at 08:12

## 2019-12-18 RX ADMIN — METRONIDAZOLE 500 MG: 500 INJECTION, SOLUTION INTRAVENOUS at 06:12

## 2019-12-18 RX ADMIN — OXYCODONE HYDROCHLORIDE 5 MG: 5 TABLET ORAL at 12:12

## 2019-12-18 RX ADMIN — ACETAMINOPHEN 1000 MG: 500 TABLET ORAL at 08:12

## 2019-12-18 RX ADMIN — INSULIN DETEMIR 10 UNITS: 100 INJECTION, SOLUTION SUBCUTANEOUS at 08:12

## 2019-12-18 RX ADMIN — METRONIDAZOLE 500 MG: 500 INJECTION, SOLUTION INTRAVENOUS at 02:12

## 2019-12-18 RX ADMIN — ALBUTEROL SULFATE 2.5 MG: 2.5 SOLUTION RESPIRATORY (INHALATION) at 01:12

## 2019-12-18 RX ADMIN — DILTIAZEM HYDROCHLORIDE 120 MG: 120 CAPSULE, COATED, EXTENDED RELEASE ORAL at 09:12

## 2019-12-18 NOTE — ASSESSMENT & PLAN NOTE
Patient is a 78 yo F with an extensive PMH who presents with worsening shortness of breath and weakness.  Patient was recently discharged from the hospital for a RLL pneumonia after completing a course of antibiotics.  She improved for a short period of time following discharge and then started to worsen.  She saw her PCP who noted an elevated WBC (20k).  CT scan of the chest was obtained which showed a worsening consolidation of the RLL and RML as well as a parapneumonic effusion on the right.      DDx includes but is not limited to: Hospital acquired PNA, likely given patient's recent hospitalization; aspiration PNA, possible as patient is elderly with recurrent PNAs and a PNA in the RLL; malignancy resulting a post-obstructive PNA, possible given patient's hx of breast cancer    Plan:  -start Cefepime and Azithromycin IV to cover HAP as well as Psuedomonas.    -start Metronidazole to cover anaerobes of a possible aspiration PNA  -f/u blood cultures  -f/u resp cultures

## 2019-12-18 NOTE — HOSPITAL COURSE
In the ED, patient was noted to be afebrile and HD stable.  Her labs were remarkable for a WBC of 17k.  A lactic acid, procal and troponins were all WNL.  Her BNP was noted to be 156.  She was started on broad spectrum antibiotics with Vanc and Zosyn IV.  Influenza swab was negative.  Blood cultures were obtained.  Patient was admitted to hospital medicine for further evaluation.  She was started on Cefepime, Azithromycin and metronidazole IV that curtailed to solely Cefepime IV the following day as she was culture (+) for pseudomonas her previous admission 1 week prior.  Pulmonology was consulted given her history of breast cancer 20 years ago requiring mastectomy on the right side as there is some concern for a post-obstructive PNA.  Pulm preformed a thoracentesis on 12/18 which drained 1200 mL.  Pleural fluid was sent for cytology, cell count and culture. Results show appears to be exudative, cell cytology still pending as concern for possible malignancy given breast cancer history. Continuing Cefepime for Pseudomonal/Strep pneumoniae PNA.   6min walk test proved no need for home oxygen as patient's sats stayed above 93%.  Patient's cytology from her thora showed no malignant cells, which indicates that her PNA is likely not post obstructive.  D/c patient's IV cefepime (received 5 days) and will discharge patient with PO Levaquin for 9 days in order to complete a 14 day course for recurrent PNA.   Upon discharge patient is afebrile without leukocytosis and feels overall clinically improved.  Ordered an ambulatory referral to pulmonology and ID for outpatient follow ups given patient's recurrent PNA and large para pneumonic effusion for further management.

## 2019-12-18 NOTE — PROCEDURES
[facility]  Thoracentesis  Procedure Note    SUMMARY     Date of Procedure:      Procedure: * No surgery found *     * Surgery not found *    Assisting Surgeon: Dr. Carrasco    Indications: R pleural effusion    Pre-Operative Diagnosis: Same    Post-Operative Diagnosis: same    Anesthesia: local lidocaine 1%    Technical Procedures Used:     Description of the Findings of the Procedure:     Consent: Informed consent was obtained. Risks of the procedure were discussed including: infection, bleeding, pain, pneumothorax.    Under sterile conditions the patient was positioned. Chlorhexadine solution and sterile drapes were utilized.  1% plain lidocaine was used to anesthetize between the rib space after localized under ultrasound. Fluid was obtained after catheter inserted without  difficulty and suction applied with minimal blood loss.  A dressing was applied to the wound and wound care instructions were provided. Procedure on the right.     1200 ml of serosanguinous pleural fluid was obtained. A sample was sent to Pathology for cytogenetics, flow, and cell counts, as well as for infection analysis.    Plan:    A follow up chest x-ray was ordered.  Bed Rest for 1 hours.    Significant Surgical Tasks Conducted by the Assistant(s), if Applicable:     Complications: None; patient tolerated the procedure well.                   Condition: stable    Disposition: In room with no issues    Attestation: I was present and scrubbed for the entire procedure.

## 2019-12-18 NOTE — ASSESSMENT & PLAN NOTE
Hx of CAD and AICD placement.  HOME medication = diltiazem, carvedilol, hydralazine, irbesartan, clonidine and chlorthalidone   Patient appears volume up on exam on admission.      Plan:  -continue home medications    -strict I/O  -daily weights   -low Na diet  -1x dose of lasix of IV 40mg today

## 2019-12-18 NOTE — ASSESSMENT & PLAN NOTE
Patient fell approx 2 weeks ago prior to her last admission.  She notes tenderness to palpation along the right side.   CT scan of the chest from 12/16: Displaced fracture of the posterolateral aspect of the right 6th rib.    Plan:  -tylenol 1g TID   -lidocaine 5% patches to affected area daily

## 2019-12-18 NOTE — PLAN OF CARE
Problem: Fall Injury Risk  Goal: Absence of Fall and Fall-Related Injury  Outcome: Ongoing, Progressing  Intervention: Identify and Manage Contributors to Fall Injury Risk  Flowsheets (Taken 12/18/2019 1500)  Self-Care Promotion: independence encouraged  Medication Review/Management: medications reviewed  Intervention: Promote Injury-Free Environment  Flowsheets (Taken 12/18/2019 1500)  Safety Promotion/Fall Prevention: assistive device/personal item within reach; diversional activities provided; Fall Risk reviewed with patient/family; Fall Risk signage in place; lighting adjusted; side rails raised x 3  Environmental Safety Modification: assistive device/personal items within reach; clutter free environment maintained; lighting adjusted     Problem: Adult Inpatient Plan of Care  Goal: Plan of Care Review  Outcome: Ongoing, Progressing  Flowsheets (Taken 12/18/2019 1500)  Plan of Care Reviewed With: patient; spouse; family  Goal: Patient-Specific Goal (Individualization)  Outcome: Ongoing, Progressing  Goal: Absence of Hospital-Acquired Illness or Injury  Outcome: Ongoing, Progressing  Intervention: Prevent VTE (venous thromboembolism)  Flowsheets (Taken 12/18/2019 1500)  VTE Prevention/Management: bleeding precautions maintained; bleeding risk assessed; fluids promoted; ROM (active) performed  Goal: Optimal Comfort and Wellbeing  Outcome: Ongoing, Progressing  Intervention: Provide Person-Centered Care  Flowsheets (Taken 12/18/2019 1500)  Trust Relationship/Rapport: care explained; thoughts/feelings acknowledged  Goal: Readiness for Transition of Care  Outcome: Ongoing, Progressing  Goal: Rounds/Family Conference  Outcome: Ongoing, Progressing     Problem: Diabetes Comorbidity  Goal: Blood Glucose Level Within Desired Range  Outcome: Ongoing, Progressing  Intervention: Maintain Glycemic Control  Flowsheets (Taken 12/18/2019 1500)  Glycemic Management: blood glucose monitoring; oral hydration promoted     Problem:  Diabetes Comorbidity  Goal: Blood Glucose Level Within Desired Range  Outcome: Ongoing, Progressing  Intervention: Maintain Glycemic Control  Flowsheets (Taken 12/18/2019 1500)  Glycemic Management: blood glucose monitoring; oral hydration promoted     Problem: Skin Injury Risk Increased  Goal: Skin Health and Integrity  Outcome: Ongoing, Progressing     Problem: Skin Injury Risk Increased  Goal: Skin Health and Integrity  Outcome: Ongoing, Progressing

## 2019-12-18 NOTE — PHARMACY MED REC
"Admission Medication Reconciliation - Pharmacy Consult Note    The home medication history was taken by Livier Julian, Pharmacy Technician. Based on information gathered and subsequent review by the clinical pharmacist, the items below may need attention.     You may go to "Admission" then "Reconcile Home Medications" tabs to review and/or act upon these items.     Potentially problematic discrepancies with current MAR  o Patient IS taking the following which was not ordered upon admit  o Tramadol 50 mg PO qHS prn pain    Please address this information as you see fit.  Feel free to contact us if you have any questions or require assistance.    Teodora Fernandez, PharmD, BCPS  [id number]     PTA Medications   Medication Sig    acetaminophen (TYLENOL) 500 MG tablet Take 1,000 mg by mouth daily as needed for Pain.    aspirin (ENTERIC COATED ASPIRIN) 81 MG EC tablet Take 1 tablet by mouth once daily.     benzonatate (TESSALON PERLES) 100 MG capsule Take 2 capsules (200 mg total) by mouth 3 (three) times daily as needed for Cough.    blood sugar diagnostic (ACCU-CHEK ANGELICA) Strp Uses Accu-Check Angelica meter to test BG 4x/day (Patient taking differently: Uses Accu-Check Angelica meter to test BG 1x/day)    carvedilol (COREG) 25 MG tablet Take 1 tablet (25 mg total) by mouth 2 (two) times daily with meals.    chlorthalidone (HYGROTEN) 25 MG Tab Take 1 tablet (25 mg total) by mouth once daily.    cholecalciferol, vitamin D3, (VITAMIN D3) 2,000 unit Tab Take 1 tablet by mouth once daily.     cloNIDine 0.1 mg/24 hr td ptwk (CATAPRES) 0.1 mg/24 hr Place 1 patch onto the skin every 7 days. (Patient taking differently: Place 1 patch onto the skin every 7 days. Saturday)    CYANOCOBALAMIN, VITAMIN B-12, (B-12 DOTS ORAL) Take 1 tablet by mouth once daily.    diltiaZEM (CARDIZEM CD) 120 MG Cp24 Take 1 capsule (120 mg total) by mouth once daily.    doxycycline (VIBRA-TABS) 100 MG tablet Take 1 tablet (100 mg total) by mouth every " "12 (twelve) hours. Take with food and water    fluticasone (FLONASE) 50 mcg/actuation nasal spray 2 sprays (100 mcg total) by Each Nare route once daily.    fluticasone-salmeterol diskus inhaler 250-50 mcg Inhale 1 puff into the lungs 2 (two) times daily. This is a change from one month    hydrALAZINE (APRESOLINE) 100 MG tablet Take 1 tablet (100 mg total) by mouth 3 (three) times daily.    insulin (LANTUS SOLOSTAR U-100 INSULIN) glargine 100 units/mL (3mL) SubQ pen Inject 34 Units into the skin every evening.    irbesartan (AVAPRO) 300 MG tablet Take 1 tablet (300 mg total) by mouth every evening.    lancets (ACCU-CHEK SOFTCLIX LANCETS) Misc Uses Accu-Chek Angelica meter to test BG 4x/day (Patient taking differently: Uses Accu-Chek Angelica meter to test BG 1x/day)    levocetirizine (XYZAL) 5 MG tablet Take 1 tablet (5 mg total) by mouth every evening.    levoFLOXacin (LEVAQUIN) 500 MG tablet Take 1 tablet (500 mg total) by mouth every other day.    lidocaine (LIDODERM) 5 % Place 1 patch onto the skin once daily. Place patch to right back. Leave on for 12 hours and remove for 12 hours.    linaGLIPtin (TRADJENTA) 5 mg Tab tablet Take 1 tablet (5 mg total) by mouth once daily.    magnesium oxide (MAG-OX) 400 mg tablet Take 1 tablet (400 mg total) by mouth once daily.    metFORMIN (GLUCOPHAGE) 500 MG tablet Take 1 tablet (500 mg total) by mouth 2 (two) times daily with meals.    nitroGLYCERIN (NITROSTAT) 0.4 MG SL tablet Place 0.4 mg under the tongue every 5 (five) minutes as needed for Chest pain.     omega-3 fatty acids (FISH OIL) 500 mg Cap Take 1 capsule by mouth Twice daily.    ondansetron (ZOFRAN-ODT) 4 MG TbDL Take 1 tablet (4 mg total) by mouth every 6 (six) hours as needed.    pen needle, diabetic (BD ULTRA-FINE MINI PEN NEEDLE) 31 gauge x 3/16" Ndle USE WITH LANTUS AT BEDTIME    pravastatin (PRAVACHOL) 40 MG tablet Take 1 tablet (40 mg total) by mouth once daily.    traMADol (ULTRAM) 50 mg tablet " Take 1 tablet (50 mg total) by mouth nightly as needed for Pain. New right thoracic apin    VENTOLIN HFA 90 mcg/actuation inhaler INHALE 2 PUFFS INTO THE LUNGS EVERY 6 (SIX) HOURS AS NEEDED FOR WHEEZING. RESCUE     .    .

## 2019-12-18 NOTE — ASSESSMENT & PLAN NOTE
78 yo F with a PMH HTN, DM, CAD and CHF s/p ICD, asthma, and remote hx of breast cancer as well as BCC who presents to the ED with increased weakness, chest pain and increased shortness of breath.  Patient was recently discharged from the hospital after a stay from 12/7 to 12/10. CT scan reveals large right sided pleural effusion    Plan   Thoracentesis performed removing 1.2 liters 12/18  Will follow up lab studies.

## 2019-12-18 NOTE — ASSESSMENT & PLAN NOTE
Hx of IDDM.  Home medicine = insulin glargine qHs    Plan:  -decreased insulin detemir to 10 units qHs.  Patient had hypoglycemic episode the morning after 22 units of detemir, which was corrected with D10.

## 2019-12-18 NOTE — ASSESSMENT & PLAN NOTE
Hx of CAD and AICD placement.  HOME medication = diltiazem, carvedilol, hydralazine, irbesartan and chlorthalidone   Patient appears volume up on exam on admission.      Plan:  -continue home medications    -strict I/O  -daily weights   -low Na diet  -1x dose of lasix of IV 40mg

## 2019-12-18 NOTE — H&P
[facility] Medicine  History & Physical    Patient Name: Myesha Quinones  MRN: [id number]  Admission Date: 12/17/2019  Attending Physician: Kelvin Young MD   Primary Care Provider: Emelina Gaston MD    [facility] Medicine Team: [facility] HOSP MED 1 Hamilton Lynch MD     Patient information was obtained from patient and ER records.     Subjective:     Principal Problem:HCAP (healthcare-associated pneumonia)    Chief Complaint:   Chief Complaint   Patient presents with    Abnormal Ct Scan     guadalupe pneumonia was admitted last week, now elevated wbc        HPI: Patient is a 78 yo F with a PMH HTN, DM, CAD and CHF s/p ICD, asthma, and remote hx of breast cancer as well as BCC who presents to the ED with increased weakness, chest pain and increased shortness of breath.  Patient was recently discharged from the hospital after a stay from 12/7 to 12/10.  She was admitted due to concern for sepsis and was found to have a RLL pneumonia.  Her respiraotry cultures at that time grew pseudomonas.  Patient received a full course of antibiotics.  She states that she felt better for 2 days following discharge but started to develop worsening shortness of breath and weakness again over the last 5 days.  Patient also notes coughing a small amount of red tinged mucus with her meal today.  She saw her PCP yesterday who noted a leukocytosis of 20k on labs and started her on Levaquin and Doxycycline.  She also ordered the patient a CT scan of the chest, which was preformed the day of her admission and noted a worsening right middle and lower lobe consolidation concerning for pneumonia.  Patient's PCP instructed her to present to the hospital for further evaluation.   Of note, patient fell prior to her previous admission due to the weakness from the last PNA, which resulted in a slightly displaced fracture identified of her right 6th rib.  Patient has been using lidocaine patches at home for the pain, which have  provided minimal relief.  She denies any fevers, chills, nausea, vomiting, or abdominal pain.          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, with long-term current use of insulin 2/22/2018    COPD (chronic obstructive pulmonary disease)     COPD exacerbation 4/8/2018    Defibrillator discharge     Diabetes mellitus     Diabetes mellitus type II     Diabetes with neurologic complications     Goiter     MNG    HX: breast cancer     Hyperlipidemia     Hypertension     Iron deficiency anemia 5/16/2017    Left kidney mass     Meningioma     Osteoporosis, postmenopausal     Pacemaker     Pneumonia 12/8/2019    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    Pseudomonas pneumonia     Skin cancer     s/p excision    Sleep apnea     CPAP    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/2015    mid forehead    Unspecified vitamin D deficiency     Ventricular tachycardia     Vitamin B12 deficiency     Vitamin D deficiency disease        Past Surgical History:   Procedure Laterality Date    BASAL CELL CARCINOMA EXCISION      posterior neck and nose    BREAST BIOPSY      BREAST CYST EXCISION Left     BREAST SURGERY      CARDIAC DEFIBRILLATOR PLACEMENT      x 2    CATARACT EXTRACTION W/  INTRAOCULAR LENS IMPLANT Bilateral     CHOLECYSTECTOMY      COLONOSCOPY N/A 11/5/2019    Procedure:  COLONOSCOPY;  Surgeon: Boaz Botello MD;  Location: [facility] ENDO (4TH FLR);  Service: Endoscopy;  Laterality: N/A;  AICD - Medtronic - sm    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    FRACTURE SURGERY      left elbow and wrist as a child    HYSTERECTOMY      MASTECTOMY Right     REPLACEMENT OF IMPLANTABLE CARDIOVERTER-DEFIBRILLATOR (ICD) GENERATOR N/A 12/17/2018    Procedure: REPLACEMENT, ICD GENERATOR;  Surgeon: Jan Mckeon MD;  Location: [facility] EP LAB;  Service: Cardiology;  Laterality: N/A;  DONNA, CRT-D gen change, MDT, MAC, SK, 3 Prep    REVISION OF SKIN POCKET FOR CARDIOVERTER-DEFIBRILLATOR  12/17/2018    Procedure: Revision, Skin Pocket, For Cardioverter-Defibrillator;  Surgeon: Jan Mckeon MD;  Location: [facility] EP LAB;  Service: Cardiology;;    SQUAMOUS CELL CARCINOMA EXCISION      remved from forehead    TONSILLECTOMY         Review of patient's allergies indicates:   Allergen Reactions    Iodine and iodide containing products Hives    Nifedipine      weakness       Current Facility-Administered Medications on File Prior to Encounter   Medication    0.9%  NaCl infusion     Current Outpatient Medications on File Prior to Encounter   Medication Sig    acetaminophen (TYLENOL) 500 MG tablet Take 1,000 mg by mouth daily as needed for Pain.    aspirin (ENTERIC COATED ASPIRIN) 81 MG EC tablet Take 1 tablet by mouth once daily.     benzonatate (TESSALON PERLES) 100 MG capsule Take 2 capsules (200 mg total) by mouth 3 (three) times daily as needed for Cough.    blood sugar diagnostic (ACCU-CHEK ANGELICA) Strp Uses Accu-Check Angelica meter to test BG 4x/day (Patient taking differently: Uses Accu-Check Angelica meter to test BG 1x/day)    carvedilol (COREG) 25 MG tablet Take 1 tablet (25 mg total) by mouth 2 (two) times daily with meals.    chlorthalidone (HYGROTEN) 25 MG Tab Take 1 tablet (25 mg total) by mouth once daily.    cholecalciferol, vitamin D3, (VITAMIN D3) 2,000 unit Tab Take 1 tablet by mouth  once daily.     cloNIDine 0.1 mg/24 hr td ptwk (CATAPRES) 0.1 mg/24 hr Place 1 patch onto the skin every 7 days. (Patient taking differently: Place 1 patch onto the skin every 7 days. Friday)    CYANOCOBALAMIN, VITAMIN B-12, (B-12 DOTS ORAL) Take 1 tablet by mouth once daily.    diltiaZEM (CARDIZEM CD) 120 MG Cp24 Take 1 capsule (120 mg total) by mouth once daily.    doxycycline (VIBRA-TABS) 100 MG tablet Take 1 tablet (100 mg total) by mouth every 12 (twelve) hours. Take with food and water    fluticasone (FLONASE) 50 mcg/actuation nasal spray 2 sprays (100 mcg total) by Each Nare route once daily.    fluticasone-salmeterol diskus inhaler 250-50 mcg Inhale 1 puff into the lungs 2 (two) times daily. This is a change from one month    hydrALAZINE (APRESOLINE) 100 MG tablet Take 1 tablet (100 mg total) by mouth 3 (three) times daily.    insulin (LANTUS SOLOSTAR U-100 INSULIN) glargine 100 units/mL (3mL) SubQ pen Inject 34 Units into the skin every evening.    irbesartan (AVAPRO) 300 MG tablet Take 1 tablet (300 mg total) by mouth every evening.    lancets (ACCU-CHEK SOFTCLIX LANCETS) Misc Uses Accu-Chek Angelica meter to test BG 4x/day (Patient taking differently: Uses Accu-Chek Angelica meter to test BG 1x/day)    levocetirizine (XYZAL) 5 MG tablet Take 1 tablet (5 mg total) by mouth every evening.    levoFLOXacin (LEVAQUIN) 500 MG tablet Take 1 tablet (500 mg total) by mouth every other day.    lidocaine (LIDODERM) 5 % Place 1 patch onto the skin once daily. Place patch to right back. Leave on for 12 hours and remove for 12 hours.    linaGLIPtin (TRADJENTA) 5 mg Tab tablet Take 1 tablet (5 mg total) by mouth once daily.    magnesium oxide (MAG-OX) 400 mg tablet Take 1 tablet (400 mg total) by mouth once daily.    metFORMIN (GLUCOPHAGE) 500 MG tablet Take 1 tablet (500 mg total) by mouth 2 (two) times daily with meals.    nitroGLYCERIN (NITROSTAT) 0.4 MG SL tablet Place 0.4 mg under the tongue every 5  "(five) minutes as needed for Chest pain.     omega-3 fatty acids (FISH OIL) 500 mg Cap Take 1 capsule by mouth Twice daily.    ondansetron (ZOFRAN-ODT) 4 MG TbDL Take 1 tablet (4 mg total) by mouth every 6 (six) hours as needed.    pen needle, diabetic (BD ULTRA-FINE MINI PEN NEEDLE) 31 gauge x 3/16" Ndle USE WITH LANTUS AT BEDTIME    pravastatin (PRAVACHOL) 40 MG tablet Take 1 tablet (40 mg total) by mouth once daily.    traMADol (ULTRAM) 50 mg tablet Take 1 tablet (50 mg total) by mouth nightly as needed for Pain. New right thoracic apin    VENTOLIN HFA 90 mcg/actuation inhaler INHALE 2 PUFFS INTO THE LUNGS EVERY 6 (SIX) HOURS AS NEEDED FOR WHEEZING. RESCUE    azelastine (ASTELIN) 137 mcg (0.1 %) nasal spray 1 spray (137 mcg total) by Nasal route 2 (two) times daily. For severe allergy     Family History     Problem Relation (Age of Onset)    Asthma Mother    Cancer Brother, Son, Daughter    Diabetes Father, Sister, Brother, Brother, Brother, Brother, Son, Daughter, Son    Heart disease Father, Sister, Brother, Brother, Brother    Hypertension Brother, Brother, Mother    Melanoma Daughter    No Known Problems Maternal Grandmother, Maternal Grandfather, Paternal Grandmother, Paternal Grandfather    Obesity Daughter    Stroke Mother        Tobacco Use    Smoking status: Never Smoker    Smokeless tobacco: Never Used   Substance and Sexual Activity    Alcohol use: No     Alcohol/week: 0.0 standard drinks    Drug use: No    Sexual activity: Yes     Partners: Male     Review of Systems   Constitutional: Positive for fatigue. Negative for chills and fever.   HENT: Negative for congestion and rhinorrhea.    Eyes: Negative for photophobia and visual disturbance.   Respiratory: Positive for cough and shortness of breath. Negative for wheezing.    Cardiovascular: Positive for chest pain and leg swelling.   Gastrointestinal: Negative for abdominal distention, abdominal pain, diarrhea, nausea and vomiting. "   Genitourinary: Negative for difficulty urinating and dysuria.   Musculoskeletal: Negative for arthralgias and back pain.   Skin: Negative for pallor and rash.   Neurological: Positive for weakness. Negative for headaches.   Psychiatric/Behavioral: Negative for agitation and confusion.     Objective:     Vital Signs (Most Recent):  Temp: 98.5 °F (36.9 °C) (12/17/19 1453)  Pulse: 80 (12/17/19 1701)  Resp: (!) 30 (12/17/19 1701)  BP: (!) 167/72 (12/17/19 1701)  SpO2: 95 % (12/17/19 1701) Vital Signs (24h Range):  Temp:  [98.5 °F (36.9 °C)] 98.5 °F (36.9 °C)  Pulse:  [69-89] 80  Resp:  [18-32] 30  SpO2:  [92 %-98 %] 95 %  BP: (161-188)/(70-76) 167/72     Weight: 72.6 kg (160 lb)  Body mass index is 28.34 kg/m².    Physical Exam   Constitutional: She is oriented to person, place, and time.   Patient is a 80 yo elderly F who appears alert, awake and in NAD.    HENT:   Head: Normocephalic and atraumatic.   Eyes: Pupils are equal, round, and reactive to light. EOM are normal.   Neck: Normal range of motion.   Cardiovascular: Normal rate, regular rhythm and normal heart sounds.   No murmur heard.  Pulmonary/Chest: Effort normal. No respiratory distress. She has no wheezes. She has no rales.   Decreased breath sounds noted on the RLL.     Abdominal: Soft. Bowel sounds are normal. She exhibits no distension. There is no tenderness.   Musculoskeletal:   2+ LE noted BL   Neurological: She is alert and oriented to person, place, and time. No cranial nerve deficit.   Skin: Skin is warm and dry.   Psychiatric: She has a normal mood and affect.         CRANIAL NERVES     CN III, IV, VI   Pupils are equal, round, and reactive to light.  Extraocular motions are normal.        Significant Labs: All pertinent labs within the past 24 hours have been reviewed.     Recent Labs   Lab 12/16/19  1133 12/17/19  1536 12/17/19  1759   WBC 20.65*  --  17.06*   HGB 10.1*  --  9.2*   HCT 34.3*  --  29.4*   *  --  451*   MCV 99*  --  95    RDW 12.9  --  12.6    132*  --    K 4.8 4.3  --    CL 98 97  --    CO2 29 25  --    BUN 25* 25*  --    CREATININE 1.3 1.4  --    GLU 78 122*  --    PROT 7.0 6.9  --    ALBUMIN 2.4* 2.3*  --    BILITOT 0.4 0.4  --    AST 30 31  --    ALKPHOS 108 116  --    ALT 24 23  --        Significant Imaging: I have reviewed all pertinent imaging results/findings within the past 24 hours.       Assessment/Plan:     * HCAP (healthcare-associated pneumonia)  Patient is a 80 yo F with an extensive PMH who presents with worsening shortness of breath and weakness.  Patient was recently discharged from the hospital for a RLL pneumonia after completing a course of antibiotics.  She improved for a short period of time following discharge and then started to worsen.  She saw her PCP who noted an elevated WBC (20k).  CT scan of the chest was obtained which showed a worsening consolidation of the RLL and RML as well as a parapneumonic effusion on the right.      DDx includes but is not limited to: Hospital acquired PNA, likely given patient's recent hospitalization; aspiration PNA, possible as patient is elderly with recurrent PNAs and a PNA in the RLL; malignancy resulting a post-obstructive PNA, possible given patient's hx of breast cancer    Plan:  -start Cefepime and Azithromycin IV to cover HAP as well as Psuedomonas.    -start Metronidazole to cover anaerobes of a possible aspiration PNA  -f/u blood cultures  -f/u resp cultures     Coronary artery disease involving native coronary artery without angina pectoris - Non-obstructive 50% LAD  Hx of CAD and AICD placement.  HOME medication = diltiazem, carvedilol, hydralazine, irbesartan and chlorthalidone   Patient appears volume up on exam on admission.      Plan:  -continue home medications    -strict I/O  -daily weights   -low Na diet  -1x dose of lasix of IV 40mg       Rib fracture  Patient fell approx 2 weeks ago prior to her last admission.  She notes tenderness to palpation  along the right side.   CT scan of the chest from 12/16: Displaced fracture of the posterolateral aspect of the right 6th rib.    Plan:  -tylenol 1g TID   -lidocaine 5% patches to affected area daily    Type 2 diabetes mellitus with stage 3 chronic kidney disease, with long-term current use of insulin  Hx of IDDM.  Home medicine = insulin glargine qHs    Plan:  -started insulin detemir qHs       Hyperlipidemia  Hx of HLD. Home medication = pravastatin PO    Plan:  -continue home meds      Essential hypertension  Hx of HTN.    Will continue home medications.    Asthma, chronic  Hx of chronic asthma. HOME medication = fluticasone furoate-vilanterol inhaler     Plan:  -continue home medication       VTE Risk Mitigation (From admission, onward)         Ordered     enoxaparin injection 40 mg  Daily      12/17/19 1824     IP VTE HIGH RISK PATIENT  Once      12/17/19 1824     Place sequential compression device  Until discontinued      12/17/19 7733                   Hamilton Lynch MD  Department of Hospital Medicine   [facility]

## 2019-12-18 NOTE — CONSULTS
[facility]  Pulmonology  Consult Note    Patient Name: Myesha Quinones  MRN: [id number]  Admission Date: 12/17/2019  Hospital Length of Stay: 1 days  Code Status: Full Code  Attending Physician: Kelvin Young MD  Primary Care Provider: Emelina Gaston MD   Principal Problem: HCAP (healthcare-associated pneumonia)    Inpatient consult to Pulmonology  Consult performed by: Bhavesh Felix MD  Consult ordered by: Kelvin Young MD  Reason for consult: Pleural Effusion        Subjective:     HPI:  80 yo F with a PMH HTN, DM, CAD and CHF s/p ICD, asthma, and remote hx of breast cancer as well as BCC who presents to the ED with increased weakness, chest pain and increased shortness of breath.  Patient was recently discharged from the hospital after a stay from 12/7 to 12/10.  She was admitted due to concern for sepsis and was found to have a RLL pneumonia.  Her respiraotry cultures at that time grew pseudomonas.  Patient received a full course of antibiotics.  She states that she felt better for 2 days following discharge but started to develop worsening shortness of breath and weakness again over the last 5 days. CT scan 12/17 reveals large right sided pleural effusion.     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, with long-term current use of insulin 2/22/2018    COPD (chronic  obstructive pulmonary disease)     COPD exacerbation 4/8/2018    Defibrillator discharge     Diabetes mellitus     Diabetes mellitus type II     Diabetes with neurologic complications     Goiter     MNG    HX: breast cancer     Hyperlipidemia     Hypertension     Iron deficiency anemia 5/16/2017    Left kidney mass     Meningioma     Osteoporosis, postmenopausal     Pacemaker     Pneumonia 12/8/2019    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    Pseudomonas pneumonia     Skin cancer     s/p excision    Sleep apnea     CPAP    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/2015    mid forehead    Unspecified vitamin D deficiency     Ventricular tachycardia     Vitamin B12 deficiency     Vitamin D deficiency disease        Past Surgical History:   Procedure Laterality Date    BASAL CELL CARCINOMA EXCISION      posterior neck and nose    BREAST BIOPSY      BREAST CYST EXCISION Left     BREAST SURGERY      CARDIAC DEFIBRILLATOR PLACEMENT      x 2    CATARACT EXTRACTION W/  INTRAOCULAR LENS IMPLANT Bilateral     CHOLECYSTECTOMY      COLONOSCOPY N/A 11/5/2019    Procedure: COLONOSCOPY;  Surgeon: Boaz Botello MD;  Location: [facility] ENDO ([address] [place] [address]);  Service: Endoscopy;  Laterality: N/A;  AICD - Medtronic -     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    FRACTURE SURGERY      left elbow and wrist as a child    HYSTERECTOMY      MASTECTOMY Right     REPLACEMENT OF IMPLANTABLE CARDIOVERTER-DEFIBRILLATOR (ICD) GENERATOR N/A 12/17/2018    Procedure: REPLACEMENT, ICD GENERATOR;  Surgeon: Jan Mckeon MD;  Location: [facility] EP LAB;  Service: Cardiology;  Laterality: N/A;  DONNA, CRT-D gen MD sulemaT, MAC, SK, 3 Prep    REVISION OF SKIN POCKET FOR CARDIOVERTER-DEFIBRILLATOR  12/17/2018    Procedure: Revision, Skin Pocket, For Cardioverter-Defibrillator;  Surgeon: Jan Mckeon MD;  Location: [facility] EP LAB;  Service: Cardiology;;    SQUAMOUS CELL CARCINOMA EXCISION      remved from forehead     TONSILLECTOMY         Review of patient's allergies indicates:   Allergen Reactions    Iodine and iodide containing products Hives    Nifedipine      weakness       Family History     Problem Relation (Age of Onset)    Asthma Mother    Cancer Brother, Son, Daughter    Diabetes Father, Sister, Brother, Brother, Brother, Brother, Son, Daughter, Son    Heart disease Father, Sister, Brother, Brother, Brother    Hypertension Brother, Brother, Mother    Melanoma Daughter    No Known Problems Maternal Grandmother, Maternal Grandfather, Paternal Grandmother, Paternal Grandfather    Obesity Daughter    Stroke Mother        Tobacco Use    Smoking status: Never Smoker    Smokeless tobacco: Never Used   Substance and Sexual Activity    Alcohol use: No     Alcohol/week: 0.0 standard drinks    Drug use: No    Sexual activity: Yes     Partners: Male         Review of Systems   Constitutional: Positive for chills and fever.   Respiratory: Positive for chest tightness and shortness of breath.    Cardiovascular: Positive for chest pain. Negative for palpitations.   Gastrointestinal: Negative for abdominal pain and vomiting.   Neurological: Negative for dizziness and light-headedness.   Psychiatric/Behavioral: Negative for agitation and confusion.     Objective:     Vital Signs (Most Recent):  Temp: 98.3 °F (36.8 °C) (12/18/19 1140)  Pulse: 99 (12/18/19 1302)  Resp: 20 (12/18/19 1302)  BP: (!) 169/72 (12/18/19 1140)  SpO2: 96 % (12/18/19 1302) Vital Signs (24h Range):  Temp:  [97 °F (36.1 °C)-98.6 °F (37 °C)] 98.3 °F (36.8 °C)  Pulse:  [] 99  Resp:  [16-32] 20  SpO2:  [90 %-98 %] 96 %  BP: ()/(51-76) 169/72     Weight: 72.6 kg (160 lb)  Body mass index is 28.34 kg/m².      Intake/Output Summary (Last 24 hours) at 12/18/2019 1451  Last data filed at 12/17/2019 1828  Gross per 24 hour   Intake 600 ml   Output --   Net 600 ml       Physical Exam   Constitutional: She is oriented to person, place, and time. She appears  well-developed and well-nourished.   HENT:   Head: Normocephalic and atraumatic.   Eyes: EOM are normal.   Neck: Normal range of motion. No JVD present.   Cardiovascular: Normal rate, regular rhythm, normal heart sounds and intact distal pulses.   Pulmonary/Chest: Effort normal. No respiratory distress.   Decreased breath sounds bilaterally    Abdominal: Soft. She exhibits no distension. There is no tenderness.   Musculoskeletal: Normal range of motion. She exhibits no tenderness.   Neurological: She is alert and oriented to person, place, and time. No cranial nerve deficit.   Psychiatric: She has a normal mood and affect.       Vents:       Lines/Drains/Airways     Peripheral Intravenous Line                 Peripheral IV - Single Lumen 12/17/19 2150 22 G Anterior;Left Forearm less than 1 day                Significant Labs:    CBC/Anemia Profile:  Recent Labs   Lab 12/17/19  1759 12/18/19  0516   WBC 17.06* 11.59   HGB 9.2* 8.5*   HCT 29.4* 27.7*   * 439*   MCV 95 97   RDW 12.6 12.7        Chemistries:  Recent Labs   Lab 12/17/19  1536 12/18/19  0516 12/18/19  1227   * 131* 131*   K 4.3 4.0 3.9   CL 97 98 94*   CO2 25 26 27   BUN 25* 27* 29*   CREATININE 1.4 1.5* 1.6*   CALCIUM 9.4 9.0 9.4   ALBUMIN 2.3* 2.0* 2.2*   PROT 6.9 6.3 6.8  6.8   BILITOT 0.4 0.4 0.4   ALKPHOS 116 96 102   ALT 23 19 19   AST 31 25 23   MG 1.8 1.8  --    PHOS 3.6 4.6*  --          Assessment/Plan:     * HCAP (healthcare-associated pneumonia)  80 yo F with a PMH HTN, DM, CAD and CHF s/p ICD, asthma, and remote hx of breast cancer as well as BCC who presents to the ED with increased weakness, chest pain and increased shortness of breath.  Patient was recently discharged from the hospital after a stay from 12/7 to 12/10. CT scan reveals large right sided pleural effusion    Plan   Thoracentesis performed removing 1.2 liters 12/18  Will follow up lab studies.           Thank you for your consult. I will follow-up with patient.  Please contact us if you have any additional questions.     Bhavesh Felix MD  Pulmonology  [facility]

## 2019-12-18 NOTE — ASSESSMENT & PLAN NOTE
Hx of chronic asthma. HOME medication = fluticasone furoate-vilanterol inhaler     Plan:  -continue home medication

## 2019-12-18 NOTE — SUBJECTIVE & OBJECTIVE
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, with long-term current use of insulin 2/22/2018    COPD (chronic obstructive pulmonary disease)     COPD exacerbation 4/8/2018    Defibrillator discharge     Diabetes mellitus     Diabetes mellitus type II     Diabetes with neurologic complications     Goiter     MNG    HX: breast cancer     Hyperlipidemia     Hypertension     Iron deficiency anemia 5/16/2017    Left kidney mass     Meningioma     Osteoporosis, postmenopausal     Pacemaker     Pneumonia 12/8/2019    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    Pseudomonas pneumonia     Skin cancer     s/p excision    Sleep apnea     CPAP    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/2015    mid forehead    Unspecified vitamin D deficiency     Ventricular tachycardia     Vitamin B12 deficiency     Vitamin D deficiency disease        Past Surgical History:   Procedure Laterality Date    BASAL CELL CARCINOMA EXCISION      posterior neck and nose    BREAST BIOPSY      BREAST CYST EXCISION Left     BREAST SURGERY      CARDIAC DEFIBRILLATOR PLACEMENT      x 2    CATARACT EXTRACTION W/  INTRAOCULAR LENS IMPLANT Bilateral     CHOLECYSTECTOMY      COLONOSCOPY N/A 11/5/2019    Procedure: COLONOSCOPY;  Surgeon: Boaz Botello MD;  Location: [facility] ([address] [place] [address]);  Service: Endoscopy;  Laterality: N/A;   AICD - [employer] -     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    FRACTURE SURGERY      left elbow and wrist as a child    HYSTERECTOMY      MASTECTOMY Right     REPLACEMENT OF IMPLANTABLE CARDIOVERTER-DEFIBRILLATOR (ICD) GENERATOR N/A 12/17/2018    Procedure: REPLACEMENT, ICD GENERATOR;  Surgeon: Jan Mckeon MD;  Location: [facility] EP LAB;  Service: Cardiology;  Laterality: N/A;  DONNA, CRT-D gen change, MDT, MAC, SK, 3 Prep    REVISION OF SKIN POCKET FOR CARDIOVERTER-DEFIBRILLATOR  12/17/2018    Procedure: Revision, Skin Pocket, For Cardioverter-Defibrillator;  Surgeon: Jan Mckeon MD;  Location: [facility] EP LAB;  Service: Cardiology;;    SQUAMOUS CELL CARCINOMA EXCISION      remved from forehead    TONSILLECTOMY         Review of patient's allergies indicates:   Allergen Reactions    Iodine and iodide containing products Hives    Nifedipine      weakness       Current Facility-Administered Medications on File Prior to Encounter   Medication    0.9%  NaCl infusion     Current Outpatient Medications on File Prior to Encounter   Medication Sig    acetaminophen (TYLENOL) 500 MG tablet Take 1,000 mg by mouth daily as needed for Pain.    aspirin (ENTERIC COATED ASPIRIN) 81 MG EC tablet Take 1 tablet by mouth once daily.     benzonatate (TESSALON PERLES) 100 MG capsule Take 2 capsules (200 mg total) by mouth 3 (three) times daily as needed for Cough.    blood sugar diagnostic (ACCU-CHEK HECTOR) Strp Uses Accu-Check Hector meter to test BG 4x/day (Patient taking differently: Uses Accu-Check Hector meter to test BG 1x/day)    carvedilol (COREG) 25 MG tablet Take 1 tablet (25 mg total) by mouth 2 (two) times daily with meals.    chlorthalidone (HYGROTEN) 25 MG Tab Take 1 tablet (25 mg total) by mouth once daily.    cholecalciferol, vitamin D3, (VITAMIN D3) 2,000 unit Tab Take 1 tablet by mouth once daily.     cloNIDine 0.1 mg/24 hr td ptwk (CATAPRES) 0.1 mg/24 hr Place 1 patch onto the skin every 7 days.  (Patient taking differently: Place 1 patch onto the skin every 7 days. Friday)    CYANOCOBALAMIN, VITAMIN B-12, (B-12 DOTS ORAL) Take 1 tablet by mouth once daily.    diltiaZEM (CARDIZEM CD) 120 MG Cp24 Take 1 capsule (120 mg total) by mouth once daily.    doxycycline (VIBRA-TABS) 100 MG tablet Take 1 tablet (100 mg total) by mouth every 12 (twelve) hours. Take with food and water    fluticasone (FLONASE) 50 mcg/actuation nasal spray 2 sprays (100 mcg total) by Each Nare route once daily.    fluticasone-salmeterol diskus inhaler 250-50 mcg Inhale 1 puff into the lungs 2 (two) times daily. This is a change from one month    hydrALAZINE (APRESOLINE) 100 MG tablet Take 1 tablet (100 mg total) by mouth 3 (three) times daily.    insulin (LANTUS SOLOSTAR U-100 INSULIN) glargine 100 units/mL (3mL) SubQ pen Inject 34 Units into the skin every evening.    irbesartan (AVAPRO) 300 MG tablet Take 1 tablet (300 mg total) by mouth every evening.    lancets (ACCU-CHEK SOFTCLIX LANCETS) Misc Uses Accu-Chek Angelica meter to test BG 4x/day (Patient taking differently: Uses Accu-Chek Angelica meter to test BG 1x/day)    levocetirizine (XYZAL) 5 MG tablet Take 1 tablet (5 mg total) by mouth every evening.    levoFLOXacin (LEVAQUIN) 500 MG tablet Take 1 tablet (500 mg total) by mouth every other day.    lidocaine (LIDODERM) 5 % Place 1 patch onto the skin once daily. Place patch to right back. Leave on for 12 hours and remove for 12 hours.    linaGLIPtin (TRADJENTA) 5 mg Tab tablet Take 1 tablet (5 mg total) by mouth once daily.    magnesium oxide (MAG-OX) 400 mg tablet Take 1 tablet (400 mg total) by mouth once daily.    metFORMIN (GLUCOPHAGE) 500 MG tablet Take 1 tablet (500 mg total) by mouth 2 (two) times daily with meals.    nitroGLYCERIN (NITROSTAT) 0.4 MG SL tablet Place 0.4 mg under the tongue every 5 (five) minutes as needed for Chest pain.     omega-3 fatty acids (FISH OIL) 500 mg Cap Take 1 capsule by mouth Twice  "daily.    ondansetron (ZOFRAN-ODT) 4 MG TbDL Take 1 tablet (4 mg total) by mouth every 6 (six) hours as needed.    pen needle, diabetic (BD ULTRA-FINE MINI PEN NEEDLE) 31 gauge x 3/16" Ndle USE WITH LANTUS AT BEDTIME    pravastatin (PRAVACHOL) 40 MG tablet Take 1 tablet (40 mg total) by mouth once daily.    traMADol (ULTRAM) 50 mg tablet Take 1 tablet (50 mg total) by mouth nightly as needed for Pain. New right thoracic apin    VENTOLIN HFA 90 mcg/actuation inhaler INHALE 2 PUFFS INTO THE LUNGS EVERY 6 (SIX) HOURS AS NEEDED FOR WHEEZING. RESCUE    azelastine (ASTELIN) 137 mcg (0.1 %) nasal spray 1 spray (137 mcg total) by Nasal route 2 (two) times daily. For severe allergy     Family History     Problem Relation (Age of Onset)    Asthma Mother    Cancer Brother, Son, Daughter    Diabetes Father, Sister, Brother, Brother, Brother, Brother, Son, Daughter, Son    Heart disease Father, Sister, Brother, Brother, Brother    Hypertension Brother, Brother, Mother    Melanoma Daughter    No Known Problems Maternal Grandmother, Maternal Grandfather, Paternal Grandmother, Paternal Grandfather    Obesity Daughter    Stroke Mother        Tobacco Use    Smoking status: Never Smoker    Smokeless tobacco: Never Used   Substance and Sexual Activity    Alcohol use: No     Alcohol/week: 0.0 standard drinks    Drug use: No    Sexual activity: Yes     Partners: Male     Review of Systems   Constitutional: Positive for fatigue. Negative for chills and fever.   HENT: Negative for congestion and rhinorrhea.    Eyes: Negative for photophobia and visual disturbance.   Respiratory: Positive for cough and shortness of breath. Negative for wheezing.    Cardiovascular: Positive for chest pain and leg swelling.   Gastrointestinal: Negative for abdominal distention, abdominal pain, diarrhea, nausea and vomiting.   Genitourinary: Negative for difficulty urinating and dysuria.   Musculoskeletal: Negative for arthralgias and back pain. "   Skin: Negative for pallor and rash.   Neurological: Positive for weakness. Negative for headaches.   Psychiatric/Behavioral: Negative for agitation and confusion.     Objective:     Vital Signs (Most Recent):  Temp: 98.5 °F (36.9 °C) (12/17/19 1453)  Pulse: 80 (12/17/19 1701)  Resp: (!) 30 (12/17/19 1701)  BP: (!) 167/72 (12/17/19 1701)  SpO2: 95 % (12/17/19 1701) Vital Signs (24h Range):  Temp:  [98.5 °F (36.9 °C)] 98.5 °F (36.9 °C)  Pulse:  [69-89] 80  Resp:  [18-32] 30  SpO2:  [92 %-98 %] 95 %  BP: (161-188)/(70-76) 167/72     Weight: 72.6 kg (160 lb)  Body mass index is 28.34 kg/m².    Physical Exam   Constitutional: She is oriented to person, place, and time.   Patient is a 78 yo elderly F who appears alert, awake and in NAD.    HENT:   Head: Normocephalic and atraumatic.   Eyes: Pupils are equal, round, and reactive to light. EOM are normal.   Neck: Normal range of motion.   Cardiovascular: Normal rate, regular rhythm and normal heart sounds.   No murmur heard.  Pulmonary/Chest: Effort normal. No respiratory distress. She has no wheezes. She has no rales.   Decreased breath sounds noted on the RLL.     Abdominal: Soft. Bowel sounds are normal. She exhibits no distension. There is no tenderness.   Musculoskeletal:   2+ LE noted BL   Neurological: She is alert and oriented to person, place, and time. No cranial nerve deficit.   Skin: Skin is warm and dry.   Psychiatric: She has a normal mood and affect.         CRANIAL NERVES     CN III, IV, VI   Pupils are equal, round, and reactive to light.  Extraocular motions are normal.        Significant Labs: All pertinent labs within the past 24 hours have been reviewed.     Recent Labs   Lab 12/16/19  1133 12/17/19  1536 12/17/19  1759   WBC 20.65*  --  17.06*   HGB 10.1*  --  9.2*   HCT 34.3*  --  29.4*   *  --  451*   MCV 99*  --  95   RDW 12.9  --  12.6    132*  --    K 4.8 4.3  --    CL 98 97  --    CO2 29 25  --    BUN 25* 25*  --    CREATININE 1.3  1.4  --    GLU 78 122*  --    PROT 7.0 6.9  --    ALBUMIN 2.4* 2.3*  --    BILITOT 0.4 0.4  --    AST 30 31  --    ALKPHOS 108 116  --    ALT 24 23  --        Significant Imaging: I have reviewed all pertinent imaging results/findings within the past 24 hours.

## 2019-12-18 NOTE — SUBJECTIVE & OBJECTIVE
Interval History: Patient had an episode of hypoglycemia (33) on presentation this morning.  She received 250cc of D10 which elevated her glucose to 120s.  She states that she was little drowsy from the blood sugar but felt better after receiving the IV fluids. Patient states that her weakness and shortness of breath have remained generally the same since admission yesterday.     Review of Systems   Constitutional: Positive for fatigue. Negative for fever.   Respiratory: Positive for shortness of breath. Negative for cough.    Cardiovascular: Negative for chest pain and leg swelling.   Gastrointestinal: Negative for abdominal distention and abdominal pain.   Neurological: Positive for weakness. Negative for headaches.     Objective:     Vital Signs (Most Recent):  Temp: 98.3 °F (36.8 °C) (12/18/19 1140)  Pulse: 99 (12/18/19 1302)  Resp: 20 (12/18/19 1302)  BP: (!) 169/72 (12/18/19 1140)  SpO2: 96 % (12/18/19 1302) Vital Signs (24h Range):  Temp:  [97 °F (36.1 °C)-98.6 °F (37 °C)] 98.3 °F (36.8 °C)  Pulse:  [] 99  Resp:  [16-32] 20  SpO2:  [90 %-98 %] 96 %  BP: ()/(51-76) 169/72     Weight: 72.6 kg (160 lb)  Body mass index is 28.34 kg/m².    Intake/Output Summary (Last 24 hours) at 12/18/2019 1523  Last data filed at 12/17/2019 1828  Gross per 24 hour   Intake 600 ml   Output --   Net 600 ml      Physical Exam   Constitutional: She is oriented to person, place, and time.   Patient is a 78 yo elderly F who appears alert, awake and in NAD.    HENT:   Head: Normocephalic and atraumatic.   Eyes: Pupils are equal, round, and reactive to light. EOM are normal.   Neck: Normal range of motion.   Cardiovascular: Normal rate, regular rhythm and normal heart sounds.   No murmur heard.  Pulmonary/Chest: Effort normal. No respiratory distress. She has no wheezes. She has no rales.   Decreased breath sounds noted on the RLL.     Abdominal: Soft. Bowel sounds are normal. She exhibits no distension. There is no  tenderness.   Musculoskeletal:   1+ LE noted BL   Neurological: She is alert and oriented to person, place, and time. No cranial nerve deficit.   Skin: Skin is warm and dry.   Psychiatric: She has a normal mood and affect.     Significant Labs: All pertinent labs within the past 24 hours have been reviewed.     Recent Labs   Lab 12/16/19  1133 12/17/19  1536 12/17/19  1759 12/18/19  0516 12/18/19  1227   WBC 20.65*  --  17.06* 11.59  --    HGB 10.1*  --  9.2* 8.5*  --    HCT 34.3*  --  29.4* 27.7*  --    *  --  451* 439*  --    MCV 99*  --  95 97  --    RDW 12.9  --  12.6 12.7  --     132*  --  131* 131*   K 4.8 4.3  --  4.0 3.9   CL 98 97  --  98 94*   CO2 29 25  --  26 27   BUN 25* 25*  --  27* 29*   CREATININE 1.3 1.4  --  1.5* 1.6*   GLU 78 122*  --  47* 167*  167*   PROT 7.0 6.9  --  6.3 6.8  6.8   ALBUMIN 2.4* 2.3*  --  2.0* 2.2*   BILITOT 0.4 0.4  --  0.4 0.4   AST 30 31  --  25 23   ALKPHOS 108 116  --  96 102   ALT 24 23  --  19 19       Significant Imaging: I have reviewed all pertinent imaging results/findings within the past 24 hours.

## 2019-12-18 NOTE — HPI
80 yo F with a PMH HTN, DM, CAD and CHF s/p ICD, asthma, and remote hx of breast cancer as well as BCC who presents to the ED with increased weakness, chest pain and increased shortness of breath.  Patient was recently discharged from the hospital after a stay from 12/7 to 12/10.  She was admitted due to concern for sepsis and was found to have a RLL pneumonia.  Her respiraotry cultures at that time grew pseudomonas.  Patient received a full course of antibiotics.  She states that she felt better for 2 days following discharge but started to develop worsening shortness of breath and weakness again over the last 5 days. CT scan 12/17 reveals large right sided pleural effusion.

## 2019-12-18 NOTE — PROGRESS NOTES
[facility] Hospital Medicine  Progress Note    Patient Name: Myesha Quinones  MRN: [id number]  Patient Class: IP- Inpatient   Admission Date: 12/17/2019  Length of Stay: 1 days  Attending Physician: Kelvin Young MD  Primary Care Provider: Emelina Gaston MD    [facility] Medicine Team: [facility] HOSP MED 1 Hamilton Lynch MD    Subjective:     Principal Problem:HCAP (healthcare-associated pneumonia)        HPI:  Patient is a 80 yo F with a PMH HTN, DM, CAD and CHF s/p ICD, asthma, and remote hx of breast cancer as well as BCC who presents to the ED with increased weakness, chest pain and increased shortness of breath.  Patient was recently discharged from the hospital after a stay from 12/7 to 12/10.  She was admitted due to concern for sepsis and was found to have a RLL pneumonia.  Her respiraotry cultures at that time grew pseudomonas.  Patient received a full course of antibiotics.  She states that she felt better for 2 days following discharge but started to develop worsening shortness of breath and weakness again over the last 5 days.  Patient also notes coughing a small amount of red tinged mucus with her meal today.  She saw her PCP yesterday who noted a leukocytosis of 20k on labs and started her on Levaquin and Doxycycline.  She also ordered the patient a CT scan of the chest, which was preformed the day of her admission and noted a worsening right middle and lower lobe consolidation concerning for pneumonia.  Patient's PCP instructed her to present to the hospital for further evaluation.   Of note, patient fell prior to her previous admission due to the weakness from the last PNA, which resulted in a slightly displaced fracture identified of her right 6th rib.  Patient has been using lidocaine patches at home for the pain, which have provided minimal relief.  She denies any fevers, chills, nausea, vomiting, or abdominal pain.          Overview/Hospital Course:  In the ED, patient was noted  to be afebrile and HD stable.  Her labs were remarkable for a WBC of 17k.  A lactic acid, procal and troponins were all WNL.  Her BNP was noted to be 156.  She was started on broad spectrum antibiotics with Vanc and Zosyn IV.  Influenza swab was negative.  Blood cultures were obtained.  Patient was admitted to hospital medicine for further evaluation.  She was started on Cefepime, Azithromycin and metronidazole IV that curtailed to solely Cefepime IV the following day as she was culture (+) for pseudomonas her previous admission 1 week prior.  Pulmonology was consulted given her history of breast cancer 20 years ago requiring mastectomy on the right side as there is some concern for a post-obstructive PNA.  Pulm preformed a thoracentesis on 12/18 which drained 1200 mL.  Pleural fluid was sent for cytology, cell count and culture.      Interval History: Patient had an episode of hypoglycemia (33) on presentation this morning.  She received 250cc of D10 which elevated her glucose to 120s.  She states that she was little drowsy from the blood sugar but felt better after receiving the IV fluids. Patient states that her weakness and shortness of breath have remained generally the same since admission yesterday.     Review of Systems   Constitutional: Positive for fatigue. Negative for fever.   Respiratory: Positive for shortness of breath. Negative for cough.    Cardiovascular: Negative for chest pain and leg swelling.   Gastrointestinal: Negative for abdominal distention and abdominal pain.   Neurological: Positive for weakness. Negative for headaches.     Objective:     Vital Signs (Most Recent):  Temp: 98.3 °F (36.8 °C) (12/18/19 1140)  Pulse: 99 (12/18/19 1302)  Resp: 20 (12/18/19 1302)  BP: (!) 169/72 (12/18/19 1140)  SpO2: 96 % (12/18/19 1302) Vital Signs (24h Range):  Temp:  [97 °F (36.1 °C)-98.6 °F (37 °C)] 98.3 °F (36.8 °C)  Pulse:  [] 99  Resp:  [16-32] 20  SpO2:  [90 %-98 %] 96 %  BP: ()/(51-76)  169/72     Weight: 72.6 kg (160 lb)  Body mass index is 28.34 kg/m².    Intake/Output Summary (Last 24 hours) at 12/18/2019 1523  Last data filed at 12/17/2019 1828  Gross per 24 hour   Intake 600 ml   Output --   Net 600 ml      Physical Exam   Constitutional: She is oriented to person, place, and time.   Patient is a 80 yo elderly F who appears alert, awake and in NAD.    HENT:   Head: Normocephalic and atraumatic.   Eyes: Pupils are equal, round, and reactive to light. EOM are normal.   Neck: Normal range of motion.   Cardiovascular: Normal rate, regular rhythm and normal heart sounds.   No murmur heard.  Pulmonary/Chest: Effort normal. No respiratory distress. She has no wheezes. She has no rales.   Decreased breath sounds noted on the RLL.     Abdominal: Soft. Bowel sounds are normal. She exhibits no distension. There is no tenderness.   Musculoskeletal:   1+ LE noted BL   Neurological: She is alert and oriented to person, place, and time. No cranial nerve deficit.   Skin: Skin is warm and dry.   Psychiatric: She has a normal mood and affect.     Significant Labs: All pertinent labs within the past 24 hours have been reviewed.     Recent Labs   Lab 12/16/19  1133 12/17/19  1536 12/17/19  1759 12/18/19  0516 12/18/19  1227   WBC 20.65*  --  17.06* 11.59  --    HGB 10.1*  --  9.2* 8.5*  --    HCT 34.3*  --  29.4* 27.7*  --    *  --  451* 439*  --    MCV 99*  --  95 97  --    RDW 12.9  --  12.6 12.7  --     132*  --  131* 131*   K 4.8 4.3  --  4.0 3.9   CL 98 97  --  98 94*   CO2 29 25  --  26 27   BUN 25* 25*  --  27* 29*   CREATININE 1.3 1.4  --  1.5* 1.6*   GLU 78 122*  --  47* 167*  167*   PROT 7.0 6.9  --  6.3 6.8  6.8   ALBUMIN 2.4* 2.3*  --  2.0* 2.2*   BILITOT 0.4 0.4  --  0.4 0.4   AST 30 31  --  25 23   ALKPHOS 108 116  --  96 102   ALT 24 23  --  19 19       Significant Imaging: I have reviewed all pertinent imaging results/findings within the past 24 hours.         Assessment/Plan:       * HCAP (healthcare-associated pneumonia)  Patient is a 80 yo F with an extensive PMH who presents with worsening shortness of breath and weakness.  Patient was recently discharged from the hospital for a RLL pneumonia after completing a course of antibiotics.  She improved for a short period of time following discharge and then started to worsen.  She saw her PCP who noted an elevated WBC (20k).  CT scan of the chest was obtained which showed a worsening consolidation of the RLL and RML as well as a parapneumonic effusion on the right.      DDx includes but is not limited to: Community acquired PNA, likely given patient's recent hospitalization; post-obstructive PNA, possible given patient's hx of breast cancer and mastectomy on the right side    Plan:  -continue Cefepime IV as patient previously grew pseudomonas on 12/7    -discontinued metronidazole and azithromycin IV  -f/u blood cultures: currently NGTD  -f/u resp cultures: currently NGTD  -consulted PULMONOLOGY given possible obstructive picture.  Thoracentesis preformed 12/19, which drained 1200mL.   -f/u cultures, cell count, and cytology from [facility]    Coronary artery disease involving native coronary artery without angina pectoris - Non-obstructive 50% LAD  Hx of CAD and AICD placement.  HOME medication = diltiazem, carvedilol, hydralazine, irbesartan, clonidine and chlorthalidone   Patient appears volume up on exam on admission.      Plan:  -continue home medications    -strict I/O  -daily weights   -low Na diet  -1x dose of lasix of IV 40mg today      Rib fracture  Patient fell approx 2 weeks ago prior to her last admission.  She notes tenderness to palpation along the right side.   CT scan of the chest from 12/16: Displaced fracture of the posterolateral aspect of the right 6th rib.    Plan:  -tylenol 1g TID   -added tramadol PRN   -lidocaine 5% patches to affected area daily    Type 2 diabetes mellitus with stage 3 chronic kidney disease, with  long-term current use of insulin  Hx of IDDM.  Home medicine = insulin glargine qHs    Plan:  -decreased insulin detemir to 10 units qHs.  Patient had hypoglycemic episode the morning after 22 units of detemir, which was corrected with D10.        Hyperlipidemia  Hx of HLD. Home medication = pravastatin PO    Plan:  -continue home meds      Essential hypertension  Hx of HTN.    Will continue home medications.    Asthma, chronic  Hx of chronic asthma. HOME medication = fluticasone furoate-vilanterol inhaler     Plan:  -continue home medication       VTE Risk Mitigation (From admission, onward)         Ordered     IP VTE HIGH RISK PATIENT  Once      12/17/19 1824     Place sequential compression device  Until discontinued      12/17/19 9042                      Hamilton Lynch MD  Department of Hospital Medicine   [facility]

## 2019-12-18 NOTE — ASSESSMENT & PLAN NOTE
Patient fell approx 2 weeks ago prior to her last admission.  She notes tenderness to palpation along the right side.   CT scan of the chest from 12/16: Displaced fracture of the posterolateral aspect of the right 6th rib.    Plan:  -tylenol 1g TID   -added tramadol PRN   -lidocaine 5% patches to affected area daily

## 2019-12-18 NOTE — NURSING
Blood sugar 33. 250 ml D10 given through IV as ordered. Pt asymptomatic. Recheck Blood sugar 139. Notified MD. Will continue to monitor.

## 2019-12-18 NOTE — HPI
Patient is a 78 yo F with a PMH HTN, DM, CAD and CHF s/p ICD, asthma, and remote hx of breast cancer as well as BCC who presents to the ED with increased weakness, chest pain and increased shortness of breath.  Patient was recently discharged from the hospital after a stay from 12/7 to 12/10.  She was admitted due to concern for sepsis and was found to have a RLL pneumonia.  Her respiraotry cultures at that time grew pseudomonas.  Patient received a full course of antibiotics.  She states that she felt better for 2 days following discharge but started to develop worsening shortness of breath and weakness again over the last 5 days.  Patient also notes coughing a small amount of red tinged mucus with her meal today.  She saw her PCP yesterday who noted a leukocytosis of 20k on labs and started her on Levaquin and Doxycycline.  She also ordered the patient a CT scan of the chest, which was preformed the day of her admission and noted a worsening right middle and lower lobe consolidation concerning for pneumonia.  Patient's PCP instructed her to present to the hospital for further evaluation.   Of note, patient fell prior to her previous admission due to the weakness from the last PNA, which resulted in a slightly displaced fracture identified of her right 6th rib.  Patient has been using lidocaine patches at home for the pain, which have provided minimal relief.  She denies any fevers, chills, nausea, vomiting, or abdominal pain.

## 2019-12-18 NOTE — ASSESSMENT & PLAN NOTE
Patient is a 80 yo F with an extensive PMH who presents with worsening shortness of breath and weakness.  Patient was recently discharged from the hospital for a RLL pneumonia after completing a course of antibiotics.  She improved for a short period of time following discharge and then started to worsen.  She saw her PCP who noted an elevated WBC (20k).  CT scan of the chest was obtained which showed a worsening consolidation of the RLL and RML as well as a parapneumonic effusion on the right.      DDx includes but is not limited to: Community acquired PNA, likely given patient's recent hospitalization; post-obstructive PNA, possible given patient's hx of breast cancer and mastectomy on the right side    Plan:  -continue Cefepime IV as patient previously grew pseudomonas on 12/7    -discontinued metronidazole and azithromycin IV  -f/u blood cultures: currently NGTD  -f/u resp cultures: currently NGTD  -consulted PULMONOLOGY given possible obstructive picture.  Thoracentesis preformed 12/19, which drained 1200mL.   -f/u cultures, cell count, and cytology from [facility]

## 2019-12-18 NOTE — PLAN OF CARE
CM met with patient for discharge planning.  Patient states she lives with her spouse in a one story house with no steps to enter.  Patient is independent with ADL's.  Patient is current with [facility].  Plan is to discharge home with continued [facility].    Pharmacy:    [id number] - [place], [place] - [address] [facility]  [address] [address]  Phone: [phone] Fax: [phone]    PCP:  Emelina Gaston MD    Payor: [employer] / Plan: [employer] / Product Type: Medicare Advantage /      12/18/19 1509   Discharge Assessment   Assessment Type Discharge Planning Assessment   Confirmed/corrected address and phone number on facesheet? Yes   Assessment information obtained from? Patient   Expected Length of Stay (days) 3   Communicated expected length of stay with patient/caregiver yes   Prior to hospitilization cognitive status: Alert/Oriented   Prior to hospitalization functional status: Independent   Current cognitive status: Alert/Oriented   Current Functional Status: Independent   Facility Arrived From: Emergency room   Lives With spouse   Able to Return to Prior Arrangements yes   Is patient able to care for self after discharge? Unable to determine at this time (comments)   Who are your caregiver(s) and their phone number(s)? Migue Quinones - spouse [phone]   Patient's perception of discharge disposition home health   Readmission Within the Last 30 Days no previous admission in last 30 days   Patient currently being followed by outpatient case management? No   Patient currently receives any other outside agency services? No   Equipment Currently Used at Home none   Do you have any problems affording any of your prescribed medications? No   Is the patient taking medications as prescribed? yes   Does the patient have transportation home? Yes   Transportation Anticipated family or friend will provide    Does the patient receive services at the [facility]? No   Discharge Plan A Home Health   Discharge Plan B Skilled Nursing Facility   DME Needed Upon Discharge  none   Patient/Family in Agreement with Plan yes

## 2019-12-18 NOTE — SUBJECTIVE & OBJECTIVE
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, with long-term current use of insulin 2/22/2018    COPD (chronic obstructive pulmonary disease)     COPD exacerbation 4/8/2018    Defibrillator discharge     Diabetes mellitus     Diabetes mellitus type II     Diabetes with neurologic complications     Goiter     MNG    HX: breast cancer     Hyperlipidemia     Hypertension     Iron deficiency anemia 5/16/2017    Left kidney mass     Meningioma     Osteoporosis, postmenopausal     Pacemaker     Pneumonia 12/8/2019    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    Pseudomonas pneumonia     Skin cancer     s/p excision    Sleep apnea     CPAP    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/2015    mid forehead    Unspecified vitamin D deficiency     Ventricular tachycardia     Vitamin B12 deficiency     Vitamin D deficiency disease        Past Surgical History:   Procedure Laterality Date    BASAL CELL CARCINOMA EXCISION      posterior neck and nose    BREAST BIOPSY      BREAST CYST EXCISION Left     BREAST SURGERY      CARDIAC DEFIBRILLATOR PLACEMENT      x 2    CATARACT EXTRACTION W/  INTRAOCULAR LENS IMPLANT Bilateral     CHOLECYSTECTOMY      COLONOSCOPY N/A 11/5/2019    Procedure: COLONOSCOPY;  Surgeon: Boaz Botello MD;  Location: [facility] ([address] [place] [address]);  Service: Endoscopy;  Laterality: N/A;   AICD - Medtronic - sm    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    FRACTURE SURGERY      left elbow and wrist as a child    HYSTERECTOMY      MASTECTOMY Right     REPLACEMENT OF IMPLANTABLE CARDIOVERTER-DEFIBRILLATOR (ICD) GENERATOR N/A 12/17/2018    Procedure: REPLACEMENT, ICD GENERATOR;  Surgeon: Jan Mckeon MD;  Location: [facility] EP [facility];  Service: Cardiology;  Laterality: N/A;  DONNA, CRT-D gen change, MDT, MAC, SK, 3 Prep    REVISION OF SKIN POCKET FOR CARDIOVERTER-DEFIBRILLATOR  12/17/2018    Procedure: Revision, Skin Pocket, For Cardioverter-Defibrillator;  Surgeon: Jan Mckeon MD;  Location: [facility] EP [facility];  Service: Cardiology;;    SQUAMOUS CELL CARCINOMA EXCISION      remved from forehead    TONSILLECTOMY         Review of patient's allergies indicates:   Allergen Reactions    Iodine and iodide containing products Hives    Nifedipine      weakness       Family History     Problem Relation (Age of Onset)    Asthma Mother    Cancer Brother, Son, Daughter    Diabetes Father, Sister, Brother, Brother, Brother, Brother, Son, Daughter, Son    Heart disease Father, Sister, Brother, Brother, Brother    Hypertension Brother, Brother, Mother    Melanoma Daughter    No Known Problems Maternal Grandmother, Maternal Grandfather, Paternal Grandmother, Paternal Grandfather    Obesity Daughter    Stroke Mother        Tobacco Use    Smoking status: Never Smoker    Smokeless tobacco: Never Used   Substance and Sexual Activity    Alcohol use: No     Alcohol/week: 0.0 standard drinks    Drug use: No    Sexual activity: Yes     Partners: Male         Review of Systems   Constitutional: Positive for chills and fever.   Respiratory: Positive for chest tightness and shortness of breath.    Cardiovascular: Positive for chest pain. Negative for palpitations.   Gastrointestinal: Negative for abdominal pain and vomiting.   Neurological: Negative for dizziness and light-headedness.   Psychiatric/Behavioral: Negative  for agitation and confusion.     Objective:     Vital Signs (Most Recent):  Temp: 98.3 °F (36.8 °C) (12/18/19 1140)  Pulse: 99 (12/18/19 1302)  Resp: 20 (12/18/19 1302)  BP: (!) 169/72 (12/18/19 1140)  SpO2: 96 % (12/18/19 1302) Vital Signs (24h Range):  Temp:  [97 °F (36.1 °C)-98.6 °F (37 °C)] 98.3 °F (36.8 °C)  Pulse:  [] 99  Resp:  [16-32] 20  SpO2:  [90 %-98 %] 96 %  BP: ()/(51-76) 169/72     Weight: 72.6 kg (160 lb)  Body mass index is 28.34 kg/m².      Intake/Output Summary (Last 24 hours) at 12/18/2019 1451  Last data filed at 12/17/2019 1828  Gross per 24 hour   Intake 600 ml   Output --   Net 600 ml       Physical Exam   Constitutional: She is oriented to person, place, and time. She appears well-developed and well-nourished.   HENT:   Head: Normocephalic and atraumatic.   Eyes: EOM are normal.   Neck: Normal range of motion. No JVD present.   Cardiovascular: Normal rate, regular rhythm, normal heart sounds and intact distal pulses.   Pulmonary/Chest: Effort normal. No respiratory distress.   Decreased breath sounds bilaterally    Abdominal: Soft. She exhibits no distension. There is no tenderness.   Musculoskeletal: Normal range of motion. She exhibits no tenderness.   Neurological: She is alert and oriented to person, place, and time. No cranial nerve deficit.   Psychiatric: She has a normal mood and affect.       Vents:       Lines/Drains/Airways     Peripheral Intravenous Line                 Peripheral IV - Single Lumen 12/17/19 2150 22 G Anterior;Left Forearm less than 1 day                Significant Labs:    CBC/Anemia Profile:  Recent Labs   Lab 12/17/19  1759 12/18/19  0516   WBC 17.06* 11.59   HGB 9.2* 8.5*   HCT 29.4* 27.7*   * 439*   MCV 95 97   RDW 12.6 12.7        Chemistries:  Recent Labs   Lab 12/17/19  1536 12/18/19  0516 12/18/19  1227   * 131* 131*   K 4.3 4.0 3.9   CL 97 98 94*   CO2 25 26 27   BUN 25* 27* 29*   CREATININE 1.4 1.5* 1.6*   CALCIUM 9.4 9.0 9.4    ALBUMIN 2.3* 2.0* 2.2*   PROT 6.9 6.3 6.8  6.8   BILITOT 0.4 0.4 0.4   ALKPHOS 116 96 102   ALT 23 19 19   AST 31 25 23   MG 1.8 1.8  --    PHOS 3.6 4.6*  --

## 2019-12-19 PROBLEM — J96.01 ACUTE HYPOXEMIC RESPIRATORY FAILURE: Status: ACTIVE | Noted: 2019-12-19

## 2019-12-19 LAB
ALBUMIN SERPL BCP-MCNC: 2 G/DL (ref 3.5–5.2)
ALP SERPL-CCNC: 94 U/L (ref 55–135)
ALT SERPL W/O P-5'-P-CCNC: 17 U/L (ref 10–44)
ANION GAP SERPL CALC-SCNC: 9 MMOL/L (ref 8–16)
AST SERPL-CCNC: 19 U/L (ref 10–40)
BASOPHILS # BLD AUTO: 0.08 K/UL (ref 0–0.2)
BASOPHILS NFR BLD: 0.7 % (ref 0–1.9)
BILIRUB SERPL-MCNC: 0.3 MG/DL (ref 0.1–1)
BUN SERPL-MCNC: 27 MG/DL (ref 8–23)
CALCIUM SERPL-MCNC: 9.1 MG/DL (ref 8.7–10.5)
CHLORIDE SERPL-SCNC: 99 MMOL/L (ref 95–110)
CO2 SERPL-SCNC: 27 MMOL/L (ref 23–29)
CREAT SERPL-MCNC: 1.4 MG/DL (ref 0.5–1.4)
DIFFERENTIAL METHOD: ABNORMAL
EOSINOPHIL # BLD AUTO: 0.5 K/UL (ref 0–0.5)
EOSINOPHIL NFR BLD: 5.1 % (ref 0–8)
ERYTHROCYTE [DISTWIDTH] IN BLOOD BY AUTOMATED COUNT: 12.7 % (ref 11.5–14.5)
EST. GFR  (AFRICAN AMERICAN): 40.9 ML/MIN/1.73 M^2
EST. GFR  (NON AFRICAN AMERICAN): 35.5 ML/MIN/1.73 M^2
GLUCOSE SERPL-MCNC: 120 MG/DL (ref 70–110)
HCT VFR BLD AUTO: 29.2 % (ref 37–48.5)
HGB BLD-MCNC: 9 G/DL (ref 12–16)
IMM GRANULOCYTES # BLD AUTO: 0.23 K/UL (ref 0–0.04)
IMM GRANULOCYTES NFR BLD AUTO: 2.2 % (ref 0–0.5)
LYMPHOCYTES # BLD AUTO: 1.1 K/UL (ref 1–4.8)
LYMPHOCYTES NFR BLD: 10.4 % (ref 18–48)
MAGNESIUM SERPL-MCNC: 1.8 MG/DL (ref 1.6–2.6)
MCH RBC QN AUTO: 29.6 PG (ref 27–31)
MCHC RBC AUTO-ENTMCNC: 30.8 G/DL (ref 32–36)
MCV RBC AUTO: 96 FL (ref 82–98)
MONOCYTES # BLD AUTO: 1.3 K/UL (ref 0.3–1)
MONOCYTES NFR BLD: 12.2 % (ref 4–15)
NEUTROPHILS # BLD AUTO: 7.4 K/UL (ref 1.8–7.7)
NEUTROPHILS NFR BLD: 69.4 % (ref 38–73)
NRBC BLD-RTO: 0 /100 WBC
PHOSPHATE SERPL-MCNC: 4.8 MG/DL (ref 2.7–4.5)
PLATELET # BLD AUTO: 457 K/UL (ref 150–350)
PMV BLD AUTO: 10.2 FL (ref 9.2–12.9)
POCT GLUCOSE: 140 MG/DL (ref 70–110)
POCT GLUCOSE: 215 MG/DL (ref 70–110)
POCT GLUCOSE: 243 MG/DL (ref 70–110)
POCT GLUCOSE: 260 MG/DL (ref 70–110)
POCT GLUCOSE: 264 MG/DL (ref 70–110)
POTASSIUM SERPL-SCNC: 3.8 MMOL/L (ref 3.5–5.1)
PROT SERPL-MCNC: 6.2 G/DL (ref 6–8.4)
RBC # BLD AUTO: 3.04 M/UL (ref 4–5.4)
SODIUM SERPL-SCNC: 135 MMOL/L (ref 136–145)
WBC # BLD AUTO: 10.67 K/UL (ref 3.9–12.7)

## 2019-12-19 PROCEDURE — 36415 COLL VENOUS BLD VENIPUNCTURE: CPT | Mod: HCNC

## 2019-12-19 PROCEDURE — 25000003 PHARM REV CODE 250: Mod: HCNC | Performed by: STUDENT IN AN ORGANIZED HEALTH CARE EDUCATION/TRAINING PROGRAM

## 2019-12-19 PROCEDURE — 11000001 HC ACUTE MED/SURG PRIVATE ROOM: Mod: HCNC

## 2019-12-19 PROCEDURE — 97165 OT EVAL LOW COMPLEX 30 MIN: CPT | Mod: HCNC

## 2019-12-19 PROCEDURE — 99232 SBSQ HOSP IP/OBS MODERATE 35: CPT | Mod: HCNC,GC,, | Performed by: HOSPITALIST

## 2019-12-19 PROCEDURE — 97161 PT EVAL LOW COMPLEX 20 MIN: CPT | Mod: HCNC

## 2019-12-19 PROCEDURE — 99232 PR SUBSEQUENT HOSPITAL CARE,LEVL II: ICD-10-PCS | Mod: HCNC,GC,, | Performed by: HOSPITALIST

## 2019-12-19 PROCEDURE — 83735 ASSAY OF MAGNESIUM: CPT | Mod: HCNC

## 2019-12-19 PROCEDURE — 80053 COMPREHEN METABOLIC PANEL: CPT | Mod: HCNC

## 2019-12-19 PROCEDURE — 25000242 PHARM REV CODE 250 ALT 637 W/ HCPCS: Mod: HCNC | Performed by: STUDENT IN AN ORGANIZED HEALTH CARE EDUCATION/TRAINING PROGRAM

## 2019-12-19 PROCEDURE — 84100 ASSAY OF PHOSPHORUS: CPT | Mod: HCNC

## 2019-12-19 PROCEDURE — 63600175 PHARM REV CODE 636 W HCPCS: Mod: HCNC | Performed by: STUDENT IN AN ORGANIZED HEALTH CARE EDUCATION/TRAINING PROGRAM

## 2019-12-19 PROCEDURE — 85025 COMPLETE CBC W/AUTO DIFF WBC: CPT | Mod: HCNC

## 2019-12-19 PROCEDURE — C9399 UNCLASSIFIED DRUGS OR BIOLOG: HCPCS | Mod: HCNC | Performed by: STUDENT IN AN ORGANIZED HEALTH CARE EDUCATION/TRAINING PROGRAM

## 2019-12-19 RX ADMIN — CARVEDILOL 25 MG: 25 TABLET, FILM COATED ORAL at 05:12

## 2019-12-19 RX ADMIN — ACETAMINOPHEN 1000 MG: 500 TABLET ORAL at 09:12

## 2019-12-19 RX ADMIN — IRBESARTAN 300 MG: 300 TABLET ORAL at 09:12

## 2019-12-19 RX ADMIN — ASPIRIN 81 MG: 81 TABLET, COATED ORAL at 09:12

## 2019-12-19 RX ADMIN — CARVEDILOL 25 MG: 25 TABLET, FILM COATED ORAL at 09:12

## 2019-12-19 RX ADMIN — LIDOCAINE 1 PATCH: 50 PATCH TOPICAL at 08:12

## 2019-12-19 RX ADMIN — Medication 6 MG: at 08:12

## 2019-12-19 RX ADMIN — ACETAMINOPHEN 1000 MG: 500 TABLET ORAL at 08:12

## 2019-12-19 RX ADMIN — INSULIN ASPART 3 UNITS: 100 INJECTION, SOLUTION INTRAVENOUS; SUBCUTANEOUS at 05:12

## 2019-12-19 RX ADMIN — INSULIN ASPART 1 UNITS: 100 INJECTION, SOLUTION INTRAVENOUS; SUBCUTANEOUS at 10:12

## 2019-12-19 RX ADMIN — INSULIN ASPART 2 UNITS: 100 INJECTION, SOLUTION INTRAVENOUS; SUBCUTANEOUS at 12:12

## 2019-12-19 RX ADMIN — CEFEPIME 2 G: 2 INJECTION, POWDER, FOR SOLUTION INTRAVENOUS at 11:12

## 2019-12-19 RX ADMIN — DILTIAZEM HYDROCHLORIDE 120 MG: 120 CAPSULE, COATED, EXTENDED RELEASE ORAL at 09:12

## 2019-12-19 RX ADMIN — FLUTICASONE FUROATE AND VILANTEROL TRIFENATATE 1 PUFF: 100; 25 POWDER RESPIRATORY (INHALATION) at 09:12

## 2019-12-19 RX ADMIN — HYDRALAZINE HYDROCHLORIDE 100 MG: 25 TABLET, FILM COATED ORAL at 09:12

## 2019-12-19 RX ADMIN — HYDRALAZINE HYDROCHLORIDE 100 MG: 25 TABLET, FILM COATED ORAL at 03:12

## 2019-12-19 RX ADMIN — INSULIN DETEMIR 10 UNITS: 100 INJECTION, SOLUTION SUBCUTANEOUS at 09:12

## 2019-12-19 RX ADMIN — HYDRALAZINE HYDROCHLORIDE 100 MG: 25 TABLET, FILM COATED ORAL at 08:12

## 2019-12-19 RX ADMIN — PRAVASTATIN SODIUM 40 MG: 10 TABLET ORAL at 09:12

## 2019-12-19 RX ADMIN — TRAMADOL HYDROCHLORIDE 50 MG: 50 TABLET, FILM COATED ORAL at 02:12

## 2019-12-19 NOTE — SUBJECTIVE & OBJECTIVE
Interval History: States that breathing has improved greatly since thora yesterday. Results show appears to be exudative, cell cytology still pending as concern for possible malignancy given breast cancer history. Continuing Cetepime for Pseudomonal/Strep pneumoniae PNA.        Review of Systems   Constitutional: Positive for fatigue. Negative for fever.   HENT: Negative for ear pain and voice change.    Eyes: Negative for visual disturbance.   Respiratory: Positive for shortness of breath. Negative for cough.    Cardiovascular: Negative for chest pain and leg swelling.   Gastrointestinal: Negative for abdominal distention, abdominal pain, diarrhea and nausea.   Genitourinary: Negative for dysuria and flank pain.   Musculoskeletal: Negative for arthralgias and gait problem.   Neurological: Positive for weakness. Negative for headaches.     Objective:     Vital Signs (Most Recent):  Temp: 96.6 °F (35.9 °C) (12/19/19 1545)  Pulse: 66 (12/19/19 1545)  Resp: 14 (12/19/19 1545)  BP: (!) 154/64 (12/19/19 1545)  SpO2: 98 % (12/19/19 1545) Vital Signs (24h Range):  Temp:  [96.1 °F (35.6 °C)-98.6 °F (37 °C)] 96.6 °F (35.9 °C)  Pulse:  [66-79] 66  Resp:  [14-20] 14  SpO2:  [92 %-99 %] 98 %  BP: (103-175)/(58-77) 154/64     Weight: 72.6 kg (160 lb)  Body mass index is 28.34 kg/m².  No intake or output data in the 24 hours ending 12/19/19 1638   Physical Exam    Significant Labs:   CBC:   Recent Labs   Lab 12/17/19  1759 12/18/19  0516 12/19/19  0419   WBC 17.06* 11.59 10.67   HGB 9.2* 8.5* 9.0*   HCT 29.4* 27.7* 29.2*   * 439* 457*     CMP:   Recent Labs   Lab 12/18/19  0516 12/18/19  1227 12/19/19  0419   * 131* 135*   K 4.0 3.9 3.8   CL 98 94* 99   CO2 26 27 27   GLU 47* 167*  167* 120*   BUN 27* 29* 27*   CREATININE 1.5* 1.6* 1.4   CALCIUM 9.0 9.4 9.1   PROT 6.3 6.8  6.8 6.2   ALBUMIN 2.0* 2.2* 2.0*   BILITOT 0.4 0.4 0.3   ALKPHOS 96 102 94   AST 25 23 19   ALT 19 19 17   ANIONGAP 7* 10 9   EGFRNONAA 32.7*  30.2* 35.5*

## 2019-12-19 NOTE — PLAN OF CARE
Evaluation complete.  Pt without skilled OT needs at this time with prior level of function.  DC OT  Amy Navarro, OT  12/19/2019

## 2019-12-19 NOTE — PLAN OF CARE
Problem: Fall Injury Risk  Goal: Absence of Fall and Fall-Related Injury  Outcome: Ongoing, Progressing     Problem: Adult Inpatient Plan of Care  Goal: Plan of Care Review  Outcome: Ongoing, Progressing  Goal: Patient-Specific Goal (Individualization)  Outcome: Ongoing, Progressing  Goal: Absence of Hospital-Acquired Illness or Injury  Outcome: Ongoing, Progressing  Goal: Optimal Comfort and Wellbeing  Outcome: Ongoing, Progressing  Goal: Readiness for Transition of Care  Outcome: Ongoing, Progressing  Goal: Rounds/Family Conference  Outcome: Ongoing, Progressing     Problem: Diabetes Comorbidity  Goal: Blood Glucose Level Within Desired Range  Outcome: Ongoing, Progressing     Problem: Skin Injury Risk Increased  Goal: Skin Health and Integrity  Outcome: Ongoing, Progressing

## 2019-12-19 NOTE — PLAN OF CARE
Patient doing better after Thora 12/18. Fluid is exudative. Will need to rule out infection vs cancer. Will follow up cytology and other lab tests. Will continue to follow along.

## 2019-12-19 NOTE — PT/OT/SLP EVAL
"Physical Therapy Evaluation and Discharge Note    Patient Name:  Myesha Quinones   MRN:  [id number]  Admitting Diagnosis:  CAP (community acquired pneumonia) due to Pseudomonas species   Recent Surgery: * No surgery found *    Admit Date: 12/17/2019  Length of Stay: 2 days    Recommendations:     Discharge Recommendations:  home   Discharge Equipment Recommendations: none   Barriers to discharge: None    Assessment:     Myesha Quinones is a 79 y.o. female admitted with a medical diagnosis of CAP (community acquired pneumonia) due to Pseudomonas species. Pt reports they are at baseline mobility with all mobility and ADLs. Pt endorses SOB due to pt's admitting diagnosis but reports decline since removing fluid. Pt denied any recent falls. PT provided education to pt regarding importance of maintaining frequent activity and ambulating while admitted to maintain current level of mobility. Pt does not require further acute skilled therapy intervention.At this time, patient is functioning at their prior level of function and does not require further acute PT services. Please re-consult if pt experiences a change in status.    Plan:     During this hospitalization, patient does not require further acute PT services.  Please re-consult if situation changes.      Subjective     RN notified prior to session.  present upon PT entrance into room.    Chief Complaint: "I was just here"  Patient/Family Comments/goals: go home  Pain/Comfort:  · Pain Rating 1: 0/10  · Pain Rating Post-Intervention 1: 0/10    Living Environment:  Patient lives with  in a single family home with 0 TAMEKA.   Prior Level of Function: Patient reports being independent with mobility & with ADLs. Patient uses DME as follows: none. DME owned (not currently used): none.  Roles/Repsonsibilities: Hand Dominance: right Work: retired. Drive: yes. Managing Medicines/Managing Home: yes. Hobbies: pt is very active in the [employer] and enjoys going to " [employer] and taking care of the contreras at [employer].    Patient reports they will have assistance from  upon discharge.    Objective:     Additional staff present: none    Patient found standing at the sink after completing toileting with telemetry upon PT entry to room.    General Precautions: Standard, fall   Orthopedic Precautions:N/A   Braces: N/A   Body mass index is 28.34 kg/m².  Respiratory Status: Nasal Cannula 2L. Pt was not on nc while ambulating in room. SPO2 at 91%. Pt ambulated in hallway on room air. SPO2 dropped to 88% but luigi to 91% with pursed lip breathing.    Exam:  · Mental Status: Patient is oriented to AxOx4 and follows all multistep commands. Pt is Alert and Cooperative during session.  · Skin Integrity: Visible skin intact  · Edema: None noted   · Sensation: Intact  · Hearing: Intact  · Vision:  Intact  · Postural Assessment: no deviations noted  · Range of Motion:  · RUE: WFL  · LUE: WFL  · RLE: WFL  · LLE: WFL  · Strength Exam:  · Bilateral Upper Extremity Strength: grossly 5/5  · Bilateral Lower Extremity Strength: grossly 5/5    Outcome Measures:  AM-PAC 6 CLICK MOBILITY  Turning over in bed (including adjusting bedclothes, sheets and blankets)?: 4  Sitting down on and standing up from a chair with arms (e.g., wheelchair, bedside commode, etc.): 4  Moving from lying on back to sitting on the side of the bed?: 4  Moving to and from a bed to a chair (including a wheelchair)?: 4  Need to walk in hospital room?: 4  Climbing 3-5 steps with a railing?: 3  Basic Mobility Total Score: 23     Functional Mobility:  Bed Mobility:   · Sit to Supine: modified independence; to Lt side of bed    Transfers:   · Sit <> Stand Transfer: supervision with no assistive device   · Stand <> Sit Transfer: supervision with no assistive device   · [id number] from bedside chair    Standing Balance:   Assistance Level Required: Supervision   Patient used: no assistive device    Time: 1 minute   Postural  deviations noted: slouched posture   Comments: Pt able to respond to with ankle strategy to internal perturbations with appropriate anticipatory and reactive responses with no LOB.    Gait:   · Patient ambulated: 250 ft   · Patient required: supervision  · Patient used:  no assistive device   · Gait Pattern observed: reciprocal gait  · Gait Deviation(s): decreased step length, decreased arm swing, steady gait and decreased sandra  · Impairments due to: decreased endurance  · all lines remained intact throughout ambulation trial  · Comments: Pt was able to perform vertical/horizontal head turns, 180 degree turns while ambulating, and avoid hallway obstacles without LOB. SPO2 dropped to 88% on room air but able to rise to 92% with pursed lip breathing.    Stairs:   Deferred as patient does not have stairs in the home    Education:   Time provided for education, counseling and discussion of health disposition in regards to patient's current status   All questions answered within PT scope of practice and to patient's satisfaction   PT role in POC to address current functional deficits   Pt educated on proper body mechanics, safety techniques, and energy conservation with PT facilitation and cueing throughout session   Whiteboard updated with pt's current mobility status documented above   Safe to perform step transfer to/from chair/bedside commode supervision and no AD w/ nursing/PCT present   Pt to ambulate 3x/day supervision and no AD with nsg/family in order to maintain functional mobility    Patient left HOB elevated with all lines intact, call button in reach, RN notified and  present.    GOALS:   Multidisciplinary Problems     Physical Therapy Goals     Not on file          Multidisciplinary Problems (Resolved)        Problem: Physical Therapy Goal    Goal Priority Disciplines Outcome Goal Variances Interventions   Physical Therapy Goal   (Resolved)     PT, PT/OT Met                     History:      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, with long-term current use of insulin 2/22/2018    COPD (chronic obstructive pulmonary disease)     COPD exacerbation 4/8/2018    Defibrillator discharge     Diabetes mellitus     Diabetes mellitus type II     Diabetes with neurologic complications     Goiter     MNG    HX: breast cancer     Hyperlipidemia     Hypertension     Iron deficiency anemia 5/16/2017    Left kidney mass     Meningioma     Osteoporosis, postmenopausal     Pacemaker     Pneumonia 12/8/2019    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    Pseudomonas pneumonia     Skin cancer     s/p excision    Sleep apnea     CPAP    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/2015    mid forehead    Unspecified vitamin D deficiency     Ventricular tachycardia     Vitamin B12 deficiency     Vitamin D deficiency disease        Past Surgical History:   Procedure Laterality Date    BASAL CELL CARCINOMA EXCISION      posterior neck and nose    BREAST BIOPSY      BREAST CYST EXCISION Left     BREAST SURGERY      CARDIAC DEFIBRILLATOR PLACEMENT      x 2    CATARACT EXTRACTION W/  INTRAOCULAR LENS IMPLANT Bilateral     CHOLECYSTECTOMY      COLONOSCOPY N/A 11/5/2019    Procedure: COLONOSCOPY;  Surgeon: Boaz Botello MD;  Location: [facility] ([address] [place] [address]);  Service: Endoscopy;  Laterality:  N/A;  AICD - Medtronic -     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    FRACTURE SURGERY      left elbow and wrist as a child    HYSTERECTOMY      MASTECTOMY Right     REPLACEMENT OF IMPLANTABLE CARDIOVERTER-DEFIBRILLATOR (ICD) GENERATOR N/A 12/17/2018    Procedure: REPLACEMENT, ICD GENERATOR;  Surgeon: Jan Mckeon MD;  Location: [facility] EP LAB;  Service: Cardiology;  Laterality: N/A;  DONNA, CRT-D gen sulema, MDT, MAC, SK, 3 Prep    REVISION OF SKIN POCKET FOR CARDIOVERTER-DEFIBRILLATOR  12/17/2018    Procedure: Revision, Skin Pocket, For Cardioverter-Defibrillator;  Surgeon: Jan Mckeon MD;  Location: [facility] EP LAB;  Service: Cardiology;;    SQUAMOUS CELL CARCINOMA EXCISION      remved from forehead    TONSILLECTOMY         Time Tracking:     PT Received On: 12/19/19  PT Start Time: 1426     PT Stop Time: 1438  PT Total Time (min): 12 min     Billable Minutes: Evaluation 12    Carla Gallegos PT, DPT  12/19/2019  Pager: [phone]

## 2019-12-19 NOTE — ASSESSMENT & PLAN NOTE
Patient is a 78 yo F with an extensive PMH who presents with worsening shortness of breath and weakness.  Patient was recently discharged from the hospital for a RLL pneumonia after completing a course of antibiotics.  She improved for a short period of time following discharge and then started to worsen.  She saw her PCP who noted an elevated WBC (20k).  CT scan of the chest was obtained which showed a worsening consolidation of the RLL and RML as well as a parapneumonic effusion on the right.      DDx includes but is not limited to: Community acquired PNA, likely given patient's recent hospitalization; post-obstructive PNA, possible given patient's hx of breast cancer and mastectomy on the right side    Consulted PULMONOLOGY given possible obstructive picture.  Thoracentesis preformed 12/19,    Plan:  -continue Cefepime IV as patient previously grew pseudomonas/Strep on 12/7    -f/u blood cultures: currently NGTD  -f/u resp cultures: currently NGTD  -f/u cultures, cell count, and cytology from [facility]

## 2019-12-19 NOTE — ASSESSMENT & PLAN NOTE
Hx of IDDM.  Home medicine = insulin glargine qHs    Plan:  -decreased insulin detemir to 10 units qHs  -BG improved today

## 2019-12-19 NOTE — PROGRESS NOTES
[facility] Hospital Medicine  Progress Note    Patient Name: Myesha Quinones  MRN: [id number]  Patient Class: IP- Inpatient   Admission Date: 12/17/2019  Length of Stay: 2 days  Attending Physician: Kelvin Young MD  Primary Care Provider: Emelina Gaston MD    [facility] Medicine Team: [facility] HOSP MED 1 Theodora Ponce MD    Subjective:     Principal Problem:CAP (community acquired pneumonia) due to Pseudomonas species        HPI:  Patient is a 78 yo F with a PMH HTN, DM, CAD and CHF s/p ICD, asthma, and remote hx of breast cancer as well as BCC who presents to the ED with increased weakness, chest pain and increased shortness of breath.  Patient was recently discharged from the hospital after a stay from 12/7 to 12/10.  She was admitted due to concern for sepsis and was found to have a RLL pneumonia.  Her respiraotry cultures at that time grew pseudomonas.  Patient received a full course of antibiotics.  She states that she felt better for 2 days following discharge but started to develop worsening shortness of breath and weakness again over the last 5 days.  Patient also notes coughing a small amount of red tinged mucus with her meal today.  She saw her PCP yesterday who noted a leukocytosis of 20k on labs and started her on Levaquin and Doxycycline.  She also ordered the patient a CT scan of the chest, which was preformed the day of her admission and noted a worsening right middle and lower lobe consolidation concerning for pneumonia.  Patient's PCP instructed her to present to the hospital for further evaluation.   Of note, patient fell prior to her previous admission due to the weakness from the last PNA, which resulted in a slightly displaced fracture identified of her right 6th rib.  Patient has been using lidocaine patches at home for the pain, which have provided minimal relief.  She denies any fevers, chills, nausea, vomiting, or abdominal pain.          Overview/Hospital Course:  In  the ED, patient was noted to be afebrile and HD stable.  Her labs were remarkable for a WBC of 17k.  A lactic acid, procal and troponins were all WNL.  Her BNP was noted to be 156.  She was started on broad spectrum antibiotics with Vanc and Zosyn IV.  Influenza swab was negative.  Blood cultures were obtained.  Patient was admitted to hospital medicine for further evaluation.  She was started on Cefepime, Azithromycin and metronidazole IV that curtailed to solely Cefepime IV the following day as she was culture (+) for pseudomonas her previous admission 1 week prior.  Pulmonology was consulted given her history of breast cancer 20 years ago requiring mastectomy on the right side as there is some concern for a post-obstructive PNA.  Pulm preformed a thoracentesis on 12/18 which drained 1200 mL.  Pleural fluid was sent for cytology, cell count and culture. Results appear to be exudative, cell cytology still pending as concern for possible malignancy given breast cancer history. Continuing Cetepime for Pseudomonal/Strep pneumoniae PNA coverage.      Interval History: States that breathing has improved greatly since thora yesterday. Results appear to be exudative, cell cytology still pending as concern for possible malignancy given breast cancer history. Continuing Cetepime for Pseudomonal/Strep pneumoniae PNA.        Review of Systems   Constitutional: Positive for fatigue. Negative for fever.   HENT: Negative for ear pain and voice change.    Eyes: Negative for visual disturbance.   Respiratory: Positive for shortness of breath. Negative for cough.    Cardiovascular: Negative for chest pain and leg swelling.   Gastrointestinal: Negative for abdominal distention, abdominal pain, diarrhea and nausea.   Genitourinary: Negative for dysuria and flank pain.   Musculoskeletal: Negative for arthralgias and gait problem.   Neurological: Positive for weakness. Negative for headaches.     Objective:     Vital Signs (Most  Recent):  Temp: 96.6 °F (35.9 °C) (12/19/19 1545)  Pulse: 66 (12/19/19 1545)  Resp: 14 (12/19/19 1545)  BP: (!) 154/64 (12/19/19 1545)  SpO2: 98 % (12/19/19 1545) Vital Signs (24h Range):  Temp:  [96.1 °F (35.6 °C)-98.6 °F (37 °C)] 96.6 °F (35.9 °C)  Pulse:  [66-79] 66  Resp:  [14-20] 14  SpO2:  [92 %-99 %] 98 %  BP: (103-175)/(58-77) 154/64     Weight: 72.6 kg (160 lb)  Body mass index is 28.34 kg/m².  No intake or output data in the 24 hours ending 12/19/19 1638   Physical Exam    Significant Labs:   CBC:   Recent Labs   Lab 12/17/19  1759 12/18/19  0516 12/19/19  0419   WBC 17.06* 11.59 10.67   HGB 9.2* 8.5* 9.0*   HCT 29.4* 27.7* 29.2*   * 439* 457*     CMP:   Recent Labs   Lab 12/18/19  0516 12/18/19  1227 12/19/19  0419   * 131* 135*   K 4.0 3.9 3.8   CL 98 94* 99   CO2 26 27 27   GLU 47* 167*  167* 120*   BUN 27* 29* 27*   CREATININE 1.5* 1.6* 1.4   CALCIUM 9.0 9.4 9.1   PROT 6.3 6.8  6.8 6.2   ALBUMIN 2.0* 2.2* 2.0*   BILITOT 0.4 0.4 0.3   ALKPHOS 96 102 94   AST 25 23 19   ALT 19 19 17   ANIONGAP 7* 10 9   EGFRNONAA 32.7* 30.2* 35.5*           Assessment/Plan:      * CAP (community acquired pneumonia) due to Pseudomonas species  Patient is a 80 yo F with an extensive PMH who presents with worsening shortness of breath and weakness.  Patient was recently discharged from the hospital for a RLL pneumonia after completing a course of antibiotics.  She improved for a short period of time following discharge and then started to worsen.  She saw her PCP who noted an elevated WBC (20k).  CT scan of the chest was obtained which showed a worsening consolidation of the RLL and RML as well as a parapneumonic effusion on the right.      DDx includes but is not limited to: Community acquired PNA, likely given patient's recent hospitalization; post-obstructive PNA, possible given patient's hx of breast cancer and mastectomy on the right side    Consulted PULMONOLOGY given possible obstructive picture.   Thoracentesis preformed 12/19,    Plan:  -continue Cefepime IV as patient previously grew pseudomonas/Strep on 12/7    -f/u blood cultures: currently NGTD  -f/u resp cultures: currently NGTD  -f/u cultures, cell count, and cytology from [facility]    Rib fracture  Patient fell approx 2 weeks ago prior to her last admission.  She notes tenderness to palpation along the right side.   CT scan of the chest from 12/16: Displaced fracture of the posterolateral aspect of the right 6th rib.    Plan:  -tylenol 1g TID   -added tramadol PRN   -lidocaine 5% patches to affected area daily    Type 2 diabetes mellitus with stage 3 chronic kidney disease, with long-term current use of insulin  Hx of IDDM.  Home medicine = insulin glargine qHs    Plan:  -decreased insulin detemir to 10 units qHs  -BG improved today       Hyperlipidemia  Hx of HLD. Home medication = pravastatin PO    Plan:  -continue home meds      Essential hypertension  Hx of HTN.    Will continue home medications.    Coronary artery disease involving native coronary artery without angina pectoris - Non-obstructive 50% LAD  Hx of CAD and AICD placement.  HOME medication = diltiazem, carvedilol, hydralazine, irbesartan, clonidine and chlorthalidone   Patient appears volume up on exam on admission.      Plan:  -continue home medications    -strict I/O  -daily weights   -low Na diet  -1x dose of lasix of IV 40mg today      Asthma, chronic  Hx of chronic asthma. HOME medication = fluticasone furoate-vilanterol inhaler     Plan:  -continue home medication       VTE Risk Mitigation (From admission, onward)         Ordered     IP VTE HIGH RISK PATIENT  Once      12/17/19 1824     Place sequential compression device  Until discontinued      12/17/19 1709                      Theodora Ponce MD  Department of Hospital Medicine   [facility]

## 2019-12-19 NOTE — PT/OT/SLP EVAL
Occupational Therapy   Evaluation and Discharge Note    Name: Myesha Quinones  MRN: [id number]  Admitting Diagnosis:  CAP (community acquired pneumonia) due to Pseudomonas species      Recommendations:     Discharge Recommendations: home  Discharge Equipment Recommendations:  none  Barriers to discharge:  None    Assessment:     Myesha Quinones is a 79 y.o. female with a medical diagnosis of CAP (community acquired pneumonia) due to Pseudomonas species. At this time, patient is functioning at their prior level of function and does not require further acute OT services.     Plan:     During this hospitalization, patient does not require further acute OT services.  Please re-consult if situation changes.    · Plan of Care Reviewed with: patient    Subjective     Chief Complaint: decreased endurance   Patient/Family Comments/goals: return to home     Occupational Profile:  Living Environment: Pt lives with spouse in 1 story house with no steps to enter   Previous level of function: Pt indep prior to admission   Equipment Used at home:  none  Assistance upon Discharge: Spouse and family able to assist     Pain/Comfort:  · Pain Rating 1: 0/10    Patients cultural, spiritual, Hindu conflicts given the current situation: no    Objective:     Communicated with: RN prior to session.  Patient found supine with peripheral IV, telemetry upon OT entry to room.    General Precautions: Standard, fall   Orthopedic Precautions:N/A   Braces: N/A     Occupational Performance:    Bed Mobility:    · Pt SBA with bed mobility     Functional Mobility/Transfers:  · Functional Mobility: Pt SBA with in room and bathroom mobility     Activities of Daily Living:  · Pt able to complete basic self care     Cognitive/Visual Perceptual:  Cognitive/Psychosocial Skills:     -       Oriented to: Person, Place, Time and Situation   -       Follows Commands/attention:Follows two-step commands  -       Communication: clear/fluent  -        Memory: No Deficits noted  -       Safety awareness/insight to disability: intact   -       Mood/Affect/Coping skills/emotional control: Appropriate to situation    Physical Exam:  Upper Extremity Range of Motion:     -       Right Upper Extremity: WFL  -       Left Upper Extremity: WFL  Upper Extremity Strength:    -       Right Upper Extremity: WFL  -       Left Upper Extremity: WFL    AMPAC 6 Click ADL:  AMPAC Total Score: 24    Treatment & Education:  Evaluation compete. Pt educated on safety, role of OT, importance of increased participation in self care for gains , expectations for participation, expectations for gains, POC, energy conservation, caregiver strain. White board updated.     Education:    Patient left supine with all lines intact    GOALS:   Multidisciplinary Problems     Occupational Therapy Goals     Not on file          Multidisciplinary Problems (Resolved)        Problem: Occupational Therapy Goal    Goal Priority Disciplines Outcome Interventions   Occupational Therapy Goal   (Resolved)     OT, PT/OT Met                    History:     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, with long-term current use of insulin 2/22/2018    COPD (chronic obstructive pulmonary disease)     COPD exacerbation 4/8/2018    Defibrillator discharge     Diabetes mellitus     Diabetes mellitus  type II     Diabetes with neurologic complications     Goiter     MNG    HX: breast cancer     Hyperlipidemia     Hypertension     Iron deficiency anemia 5/16/2017    Left kidney mass     Meningioma     Osteoporosis, postmenopausal     Pacemaker     Pneumonia 12/8/2019    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    Pseudomonas pneumonia     Skin cancer     s/p excision    Sleep apnea     CPAP    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/2015    mid forehead    Unspecified vitamin D deficiency     Ventricular tachycardia     Vitamin B12 deficiency     Vitamin D deficiency disease        Past Surgical History:   Procedure Laterality Date    BASAL CELL CARCINOMA EXCISION      posterior neck and nose    BREAST BIOPSY      BREAST CYST EXCISION Left     BREAST SURGERY      CARDIAC DEFIBRILLATOR PLACEMENT      x 2    CATARACT EXTRACTION W/  INTRAOCULAR LENS IMPLANT Bilateral     CHOLECYSTECTOMY      COLONOSCOPY N/A 11/5/2019    Procedure: COLONOSCOPY;  Surgeon: Boaz Botello MD;  Location: [facility] ENDO ([address] [place] [address]);  Service: Endoscopy;  Laterality: N/A;  AICD - [employer] -     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    FRACTURE SURGERY      left elbow and wrist as a child    HYSTERECTOMY      MASTECTOMY Right     REPLACEMENT OF IMPLANTABLE CARDIOVERTER-DEFIBRILLATOR (ICD) GENERATOR N/A 12/17/2018    Procedure: REPLACEMENT, ICD GENERATOR;  Surgeon: Jan Mckeon MD;  Location: [facility] EP LAB;  Service: Cardiology;  Laterality: N/A;  DONNA, CRT-D gen sulema, MDT, MAC, SK, 3 Prep    REVISION OF SKIN POCKET FOR CARDIOVERTER-DEFIBRILLATOR  12/17/2018    Procedure: Revision, Skin Pocket, For Cardioverter-Defibrillator;  Surgeon: Jan Mckeon MD;  Location: [facility] EP LAB;  Service: Cardiology;;    SQUAMOUS CELL CARCINOMA EXCISION      remved from forehead    TONSILLECTOMY         Time Tracking:     OT Date of Treatment: 12/19/19  OT Start Time: 1115  OT Stop Time: 1130  OT Total Time (min): 15  min    Billable Minutes:Evaluation 15    Amy Navarro, OT  12/19/2019

## 2019-12-20 PROBLEM — J96.01 ACUTE HYPOXEMIC RESPIRATORY FAILURE: Status: RESOLVED | Noted: 2019-12-19 | Resolved: 2019-12-20

## 2019-12-20 LAB
ALBUMIN SERPL BCP-MCNC: 2.2 G/DL (ref 3.5–5.2)
ALP SERPL-CCNC: 105 U/L (ref 55–135)
ALT SERPL W/O P-5'-P-CCNC: 19 U/L (ref 10–44)
ANION GAP SERPL CALC-SCNC: 9 MMOL/L (ref 8–16)
AST SERPL-CCNC: 25 U/L (ref 10–40)
BASOPHILS # BLD AUTO: 0.15 K/UL (ref 0–0.2)
BASOPHILS NFR BLD: 1.3 % (ref 0–1.9)
BILIRUB SERPL-MCNC: 0.3 MG/DL (ref 0.1–1)
BUN SERPL-MCNC: 26 MG/DL (ref 8–23)
CALCIUM SERPL-MCNC: 9.3 MG/DL (ref 8.7–10.5)
CHLORIDE SERPL-SCNC: 101 MMOL/L (ref 95–110)
CO2 SERPL-SCNC: 27 MMOL/L (ref 23–29)
CREAT SERPL-MCNC: 1.4 MG/DL (ref 0.5–1.4)
DIFFERENTIAL METHOD: ABNORMAL
EOSINOPHIL # BLD AUTO: 0.5 K/UL (ref 0–0.5)
EOSINOPHIL NFR BLD: 4.2 % (ref 0–8)
ERYTHROCYTE [DISTWIDTH] IN BLOOD BY AUTOMATED COUNT: 12.7 % (ref 11.5–14.5)
EST. GFR  (AFRICAN AMERICAN): 40.9 ML/MIN/1.73 M^2
EST. GFR  (NON AFRICAN AMERICAN): 35.5 ML/MIN/1.73 M^2
FINAL PATHOLOGIC DIAGNOSIS: NORMAL
GLUCOSE SERPL-MCNC: 149 MG/DL (ref 70–110)
HCT VFR BLD AUTO: 33.6 % (ref 37–48.5)
HGB BLD-MCNC: 9.8 G/DL (ref 12–16)
IMM GRANULOCYTES # BLD AUTO: 0.2 K/UL (ref 0–0.04)
IMM GRANULOCYTES NFR BLD AUTO: 1.8 % (ref 0–0.5)
LYMPHOCYTES # BLD AUTO: 1.3 K/UL (ref 1–4.8)
LYMPHOCYTES NFR BLD: 11.1 % (ref 18–48)
MAGNESIUM SERPL-MCNC: 1.8 MG/DL (ref 1.6–2.6)
MCH RBC QN AUTO: 29.3 PG (ref 27–31)
MCHC RBC AUTO-ENTMCNC: 29.2 G/DL (ref 32–36)
MCV RBC AUTO: 100 FL (ref 82–98)
MONOCYTES # BLD AUTO: 1.1 K/UL (ref 0.3–1)
MONOCYTES NFR BLD: 10.2 % (ref 4–15)
NEUTROPHILS # BLD AUTO: 8 K/UL (ref 1.8–7.7)
NEUTROPHILS NFR BLD: 71.4 % (ref 38–73)
NRBC BLD-RTO: 0 /100 WBC
PHOSPHATE SERPL-MCNC: 3.7 MG/DL (ref 2.7–4.5)
PLATELET # BLD AUTO: 502 K/UL (ref 150–350)
PMV BLD AUTO: 10.1 FL (ref 9.2–12.9)
POCT GLUCOSE: 152 MG/DL (ref 70–110)
POCT GLUCOSE: 240 MG/DL (ref 70–110)
POCT GLUCOSE: 262 MG/DL (ref 70–110)
POCT GLUCOSE: 316 MG/DL (ref 70–110)
POTASSIUM SERPL-SCNC: 4.2 MMOL/L (ref 3.5–5.1)
PROT SERPL-MCNC: 6.6 G/DL (ref 6–8.4)
RBC # BLD AUTO: 3.35 M/UL (ref 4–5.4)
SODIUM SERPL-SCNC: 137 MMOL/L (ref 136–145)
WBC # BLD AUTO: 11.23 K/UL (ref 3.9–12.7)

## 2019-12-20 PROCEDURE — 99232 PR SUBSEQUENT HOSPITAL CARE,LEVL II: ICD-10-PCS | Mod: HCNC,GC,, | Performed by: HOSPITALIST

## 2019-12-20 PROCEDURE — 85025 COMPLETE CBC W/AUTO DIFF WBC: CPT | Mod: HCNC

## 2019-12-20 PROCEDURE — 99232 SBSQ HOSP IP/OBS MODERATE 35: CPT | Mod: HCNC,GC,, | Performed by: HOSPITALIST

## 2019-12-20 PROCEDURE — 36415 COLL VENOUS BLD VENIPUNCTURE: CPT | Mod: HCNC

## 2019-12-20 PROCEDURE — 25000003 PHARM REV CODE 250: Mod: HCNC | Performed by: STUDENT IN AN ORGANIZED HEALTH CARE EDUCATION/TRAINING PROGRAM

## 2019-12-20 PROCEDURE — 83735 ASSAY OF MAGNESIUM: CPT | Mod: HCNC

## 2019-12-20 PROCEDURE — 11000001 HC ACUTE MED/SURG PRIVATE ROOM: Mod: HCNC

## 2019-12-20 PROCEDURE — 84100 ASSAY OF PHOSPHORUS: CPT | Mod: HCNC

## 2019-12-20 PROCEDURE — C9399 UNCLASSIFIED DRUGS OR BIOLOG: HCPCS | Mod: HCNC | Performed by: STUDENT IN AN ORGANIZED HEALTH CARE EDUCATION/TRAINING PROGRAM

## 2019-12-20 PROCEDURE — 63600175 PHARM REV CODE 636 W HCPCS: Mod: HCNC | Performed by: STUDENT IN AN ORGANIZED HEALTH CARE EDUCATION/TRAINING PROGRAM

## 2019-12-20 PROCEDURE — 80053 COMPREHEN METABOLIC PANEL: CPT | Mod: HCNC

## 2019-12-20 RX ORDER — ENOXAPARIN SODIUM 100 MG/ML
30 INJECTION SUBCUTANEOUS EVERY 24 HOURS
Status: DISCONTINUED | OUTPATIENT
Start: 2019-12-20 | End: 2019-12-21 | Stop reason: HOSPADM

## 2019-12-20 RX ORDER — INSULIN ASPART 100 [IU]/ML
2 INJECTION, SOLUTION INTRAVENOUS; SUBCUTANEOUS
Status: DISCONTINUED | OUTPATIENT
Start: 2019-12-20 | End: 2019-12-20 | Stop reason: SDUPTHER

## 2019-12-20 RX ORDER — INSULIN ASPART 100 [IU]/ML
2 INJECTION, SOLUTION INTRAVENOUS; SUBCUTANEOUS
Status: DISCONTINUED | OUTPATIENT
Start: 2019-12-20 | End: 2019-12-21 | Stop reason: HOSPADM

## 2019-12-20 RX ADMIN — ACETAMINOPHEN 1000 MG: 500 TABLET ORAL at 09:12

## 2019-12-20 RX ADMIN — CARVEDILOL 25 MG: 25 TABLET, FILM COATED ORAL at 09:12

## 2019-12-20 RX ADMIN — FLUTICASONE FUROATE AND VILANTEROL TRIFENATATE 1 PUFF: 100; 25 POWDER RESPIRATORY (INHALATION) at 09:12

## 2019-12-20 RX ADMIN — ENOXAPARIN SODIUM 30 MG: 100 INJECTION SUBCUTANEOUS at 04:12

## 2019-12-20 RX ADMIN — PRAVASTATIN SODIUM 40 MG: 10 TABLET ORAL at 09:12

## 2019-12-20 RX ADMIN — LIDOCAINE 1 PATCH: 50 PATCH TOPICAL at 10:12

## 2019-12-20 RX ADMIN — INSULIN ASPART 1 UNITS: 100 INJECTION, SOLUTION INTRAVENOUS; SUBCUTANEOUS at 10:12

## 2019-12-20 RX ADMIN — IRBESARTAN 300 MG: 300 TABLET ORAL at 10:12

## 2019-12-20 RX ADMIN — ASPIRIN 81 MG: 81 TABLET, COATED ORAL at 09:12

## 2019-12-20 RX ADMIN — ACETAMINOPHEN 1000 MG: 500 TABLET ORAL at 10:12

## 2019-12-20 RX ADMIN — HYDRALAZINE HYDROCHLORIDE 100 MG: 25 TABLET, FILM COATED ORAL at 03:12

## 2019-12-20 RX ADMIN — HYDRALAZINE HYDROCHLORIDE 100 MG: 25 TABLET, FILM COATED ORAL at 10:12

## 2019-12-20 RX ADMIN — INSULIN ASPART 2 UNITS: 100 INJECTION, SOLUTION INTRAVENOUS; SUBCUTANEOUS at 04:12

## 2019-12-20 RX ADMIN — CARVEDILOL 25 MG: 25 TABLET, FILM COATED ORAL at 04:12

## 2019-12-20 RX ADMIN — HYDRALAZINE HYDROCHLORIDE 100 MG: 25 TABLET, FILM COATED ORAL at 09:12

## 2019-12-20 RX ADMIN — Medication 6 MG: at 10:12

## 2019-12-20 RX ADMIN — INSULIN ASPART 2 UNITS: 100 INJECTION, SOLUTION INTRAVENOUS; SUBCUTANEOUS at 11:12

## 2019-12-20 RX ADMIN — CEFEPIME 2 G: 2 INJECTION, POWDER, FOR SOLUTION INTRAVENOUS at 01:12

## 2019-12-20 RX ADMIN — INSULIN ASPART 4 UNITS: 100 INJECTION, SOLUTION INTRAVENOUS; SUBCUTANEOUS at 04:12

## 2019-12-20 RX ADMIN — INSULIN DETEMIR 12 UNITS: 100 INJECTION, SOLUTION SUBCUTANEOUS at 10:12

## 2019-12-20 RX ADMIN — DILTIAZEM HYDROCHLORIDE 120 MG: 120 CAPSULE, COATED, EXTENDED RELEASE ORAL at 09:12

## 2019-12-20 RX ADMIN — ACETAMINOPHEN 1000 MG: 500 TABLET ORAL at 03:12

## 2019-12-20 NOTE — PLAN OF CARE
PLAN OF CARE REVIEWED WITH PT.  PT AA+OX4.  ABLE TO MAKE NEEDS KNOWN.  DOES NOT APPEAR TO BE IN ANY DISTRESS.  C/O PAIN.  PAIN MEDICATION GIVEN AS ORDERED.  REQUIRES STAND BY ASSIST WITH ADLS.  CONT. OF B/B.  PT REMAINS FREE FROM FALLS, INJURY, AND TRAUMA.  FALL PRECAUTIONS IN PLACE.  BED IN LOWEST POSITION WITH WHEELS LOCKED.  CALL LIGHT WITHIN REACH.  WILL CONTINUE TO MONITOR.

## 2019-12-20 NOTE — PHYSICIAN QUERY
PT Name: Myesha Quinones  MR #: [id number]    Physician Query Form - Respiratory Condition Clarification      CDS/: IRMA Weaver, RN, CDS               Contact information:[email or web address]      This form is a permanent document in the medical record.    Query Date: December 20, 2019    By submitting this query, we are merely seeking further clarification of documentation. Please utilize your independent clinical judgment when addressing the question(s) below.    The Medical record contains the following   Indicators   Supporting Clinical Findings Location in Medical Record   X   SOB, PELAYO, Wheezing, Productive Cough, Use of Accessory Muscles, etc.  Patient reports worsening shortness of breath for the past week.  It became more severe over the past few hours      It is associated with a cough     Patient is tachypneic with increased work of breathing on room air     There are rhonchi throughout the right lower lung zones      Positive for cough and shortness of breath. Negative for wheezing    ED provider note, Dr. Collins, 12/17                   H&P, Dr. Lynch, 12/17   X   Acute/Chronic Illness CAP 2/2 Pseudomonas and Strep Pneumoniae c/b Parapneumonic Effusion, Hx of CAD and AICD placement, Rib fracture, Type 2 diabetes mellitus with stage 3 chronic kidney disease, Asthma, chronic, hx of breast cancer    H&P, Dr. Lynch, 12/17   X   Radiology Findings  CT scan ordered by PCP showed enlarging RLL and RML consolidations with parapneumonic effusion.      H&P, Dr. Lynch, 12/17   X   Respiratory Distress or Failure  Acute hypoxic respiratory failure  H&P, Dr. Lynch, 12/17   X   Hypoxia or Hypercapnia  Hypoxia  ED provider note, Dr. Collins, 12/17   X   RR         ABGs         O2 sat  Patient desat to 90%, placed on 2 L nasal cannula.      Patient is ventilating appropriately on VBG     Resp 32, SpO2 92% on room air       POC PH 7.418   POC PCO2 40.8   POC PO2 47   POC BE 2   POC HCO3 26.3   POC  SATURATED O2 83 (L)   POC TCO2 28   Sample VENOUS   DelSys Room Air     ED provider note, Dr. Collins, 12/17         Vital signs 12/17 at 1603       VBG 12/17      BiPAP/Intubation     X   Supplemental O2  Placed on 2 L nasal cannula.      3 L NC  ED provider note, Dr. Collins, 12/17     Vital signs 12/18 at 1307        Home O2, Oxygen Dependence     X   Treatment  Pulmonology consulted and performed thoracentesis     Start Cefepime and Azithromycin IV to cover HAP as well as Psuedomonas     Start Metronidazole to cover anaerobes of a possible aspiration PNA     Wean supplemental oxygen as tolerated  H&P, Dr. Lynch, 12/17      Other       Respiratory failure can be acute, chronic or both. It is generally further specified as hypoxic, hypercapnic or both. Lastly, it is important to identify an etiology, if known or suspected.   References::  https://www.acphospitalist.org/archives/2013/10/coding.htm http://[employer]/acute-respiratory-failure-know     The clinical guidelines noted below are only system guidelines, and do not replace the providers clinical judgment.    Provider, please specify diagnosis or diagnoses associated with above clinical findings.     [x   ] Acute Respiratory Failure with Hypoxia - ABG pO2 < 60 mmHg or O2 sat of 88% on RA and respiratory symptoms documented     [   ] Acute Respiratory Distress - Generally describes less severe respiratory symptoms (tachypnea, in respiratory distress, increased work of breathing, unable to speak in complete sentences, labored breathing, use of accessory muscles, RR> 24, cyanosis, dyspnea, wheezing, stridor, lethargy) without sufficient measurements (pO2, SpO2, pH, and pCO2) to meet criteria for respiratory failure      [   ] Hypoxia Only     [   ] Other Respiratory Diagnosis (please specify): _________________________________     [   ]  Clinically Undetermined       Please document in your progress notes daily for the duration of treatment  until resolved and include in your discharge summary.

## 2019-12-20 NOTE — ASSESSMENT & PLAN NOTE
Hx of IDDM.  Home medicine = insulin glargine qHs    Plan:  -continue insulin detemir to 10 units qHs  -BG improved today

## 2019-12-20 NOTE — PLAN OF CARE
Plan is to discharge home with family.       12/20/19 0388   Discharge Reassessment   Assessment Type Discharge Planning Reassessment   Do you have any problems affording any of your prescribed medications? No   Discharge Plan A Home with family   Discharge Plan B Home Health   DME Needed Upon Discharge  none   Anticipated Discharge Disposition Home   Post-Acute Status   Post-Acute Authorization Other   Other Status No Post-Acute Service Needs

## 2019-12-20 NOTE — ASSESSMENT & PLAN NOTE
Patient is a 80 yo F with an extensive PMH who presents with worsening shortness of breath and weakness.  Patient was recently discharged from the hospital for a RLL pneumonia after completing a course of antibiotics.  She improved for a short period of time following discharge and then started to worsen.  She saw her PCP who noted an elevated WBC (20k).  CT scan of the chest was obtained which showed a worsening consolidation of the RLL and RML as well as a parapneumonic effusion on the right.      DDx includes but is not limited to: Community acquired PNA, likely given patient's recent hospitalization; post-obstructive PNA, possible given patient's hx of breast cancer and mastectomy on the right side    Consulted PULMONOLOGY given possible obstructive picture.  Thoracentesis preformed 12/18.     Plan:  -CT continue Cefepime IV as patient previously grew pseudomonas/Strep on 12/7    -f/u blood cultures: currently NGTD  -f/u resp cultures: currently NGTD  -f/u cultures, cell count, and cytology from [facility]

## 2019-12-20 NOTE — ASSESSMENT & PLAN NOTE
Hx of CAD and AICD placement.  HOME medication = diltiazem, carvedilol, hydralazine, irbesartan, clonidine and chlorthalidone   Patient appears volume up on exam on admission.      Plan:  -continue home medications    -strict I/O  -daily weights   -low Na diet

## 2019-12-20 NOTE — PROGRESS NOTES
[facility] Hospital Medicine  Progress Note    Patient Name: Myesha Quinones  MRN: [id number]  Patient Class: IP- Inpatient   Admission Date: 12/17/2019  Length of Stay: 3 days  Attending Physician: Kelvin Young MD  Primary Care Provider: Emelina Gaston MD    [facility] Medicine Team: [facility] HOSP MED 1 Hamilton Lynch MD    Subjective:     Principal Problem:CAP (community acquired pneumonia) due to Pseudomonas species        HPI:  Patient is a 80 yo F with a PMH HTN, DM, CAD and CHF s/p ICD, asthma, and remote hx of breast cancer as well as BCC who presents to the ED with increased weakness, chest pain and increased shortness of breath.  Patient was recently discharged from the hospital after a stay from 12/7 to 12/10.  She was admitted due to concern for sepsis and was found to have a RLL pneumonia.  Her respiraotry cultures at that time grew pseudomonas.  Patient received a full course of antibiotics.  She states that she felt better for 2 days following discharge but started to develop worsening shortness of breath and weakness again over the last 5 days.  Patient also notes coughing a small amount of red tinged mucus with her meal today.  She saw her PCP yesterday who noted a leukocytosis of 20k on labs and started her on Levaquin and Doxycycline.  She also ordered the patient a CT scan of the chest, which was preformed the day of her admission and noted a worsening right middle and lower lobe consolidation concerning for pneumonia.  Patient's PCP instructed her to present to the hospital for further evaluation.   Of note, patient fell prior to her previous admission due to the weakness from the last PNA, which resulted in a slightly displaced fracture identified of her right 6th rib.  Patient has been using lidocaine patches at home for the pain, which have provided minimal relief.  She denies any fevers, chills, nausea, vomiting, or abdominal pain.          Overview/Hospital Course:  In the  ED, patient was noted to be afebrile and HD stable.  Her labs were remarkable for a WBC of 17k.  A lactic acid, procal and troponins were all WNL.  Her BNP was noted to be 156.  She was started on broad spectrum antibiotics with Vanc and Zosyn IV.  Influenza swab was negative.  Blood cultures were obtained.  Patient was admitted to hospital medicine for further evaluation.  She was started on Cefepime, Azithromycin and metronidazole IV that curtailed to solely Cefepime IV the following day as she was culture (+) for pseudomonas her previous admission 1 week prior.  Pulmonology was consulted given her history of breast cancer 20 years ago requiring mastectomy on the right side as there is some concern for a post-obstructive PNA.  Pulm preformed a thoracentesis on 12/18 which drained 1200 mL.  Pleural fluid was sent for cytology, cell count and culture. Results show appears to be exudative, cell cytology still pending as concern for possible malignancy given breast cancer history. Continuing Cefepime for Pseudomonal/Strep pneumoniae PNA.   6min walk test scheduled for today.      Interval History: Patient feels generally much improved since the thoracentesis. No new complaints today and NAEON.  Patient has required only a minimal amount of O2 and in fact walked throughout the halls earlier today without O2 on for exercise. Will obtain an objective 6min WT today.  She continues to endorse a small amount of red-tinged sputum with her coughs as well continued right posterior rib pain, which she feels is well controlled with her current regimen.      Review of Systems   Constitutional: Negative for chills and fever.   Respiratory: Negative for shortness of breath.    Gastrointestinal: Negative for abdominal distention and abdominal pain.   Musculoskeletal:        Rib pain     Objective:     Vital Signs (Most Recent):  Temp: 98.2 °F (36.8 °C) (12/20/19 1552)  Pulse: 76 (12/20/19 1552)  Resp: 17 (12/20/19 1552)  BP: 136/65  (12/20/19 1552)  SpO2: (!) 91 % (12/20/19 1552) Vital Signs (24h Range):  Temp:  [96.2 °F (35.7 °C)-98.5 °F (36.9 °C)] 98.2 °F (36.8 °C)  Pulse:  [] 76  Resp:  [12-20] 17  SpO2:  [91 %-96 %] 91 %  BP: (136-187)/(64-75) 136/65     Weight: 69.4 kg (153 lb)  Body mass index is 27.1 kg/m².    Intake/Output Summary (Last 24 hours) at 12/20/2019 1633  Last data filed at 12/20/2019 0559  Gross per 24 hour   Intake 480 ml   Output --   Net 480 ml      Physical Exam   Constitutional: She is oriented to person, place, and time.   Patient is a 78 yo elderly F who appears alert, awake and in NAD.    HENT:   Head: Normocephalic and atraumatic.   Eyes: Pupils are equal, round, and reactive to light. EOM are normal.   Neck: Normal range of motion.   Cardiovascular: Normal rate, regular rhythm and normal heart sounds.   No murmur heard.  Pulmonary/Chest: Effort normal. No respiratory distress. She has no wheezes. She has no rales.   Decreased breath sounds noted on the RLL.     Abdominal: Soft. Bowel sounds are normal. She exhibits no distension. There is no tenderness.   Musculoskeletal:   Swelling of RUE   Neurological: She is alert and oriented to person, place, and time. No cranial nerve deficit.   Skin: Skin is warm and dry.   Psychiatric: She has a normal mood and affect.       Significant Labs: All pertinent labs within the past 24 hours have been reviewed.     Recent Labs   Lab 12/18/19  0516 12/18/19  1227 12/19/19  0419 12/20/19  0614   WBC 11.59  --  10.67 11.23   HGB 8.5*  --  9.0* 9.8*   HCT 27.7*  --  29.2* 33.6*   *  --  457* 502*   MCV 97  --  96 100*   RDW 12.7  --  12.7 12.7   * 131* 135* 137   K 4.0 3.9 3.8 4.2   CL 98 94* 99 101   CO2 26 27 27 27   BUN 27* 29* 27* 26*   CREATININE 1.5* 1.6* 1.4 1.4   GLU 47* 167*  167* 120* 149*   PROT 6.3 6.8  6.8 6.2 6.6   ALBUMIN 2.0* 2.2* 2.0* 2.2*   BILITOT 0.4 0.4 0.3 0.3   AST 25 23 19 25   ALKPHOS 96 102 94 105   ALT 19 19 17 19       Significant  Imaging: I have reviewed all pertinent imaging results/findings within the past 24 hours.         Assessment/Plan:      * CAP (community acquired pneumonia) due to Pseudomonas species  Patient is a 78 yo F with an extensive PMH who presents with worsening shortness of breath and weakness.  Patient was recently discharged from the hospital for a RLL pneumonia after completing a course of antibiotics.  She improved for a short period of time following discharge and then started to worsen.  She saw her PCP who noted an elevated WBC (20k).  CT scan of the chest was obtained which showed a worsening consolidation of the RLL and RML as well as a parapneumonic effusion on the right.      DDx includes but is not limited to: Community acquired PNA, likely given patient's recent hospitalization; post-obstructive PNA, possible given patient's hx of breast cancer and mastectomy on the right side    Consulted PULMONOLOGY given possible obstructive picture.  Thoracentesis preformed 12/18.     Plan:  -CT continue Cefepime IV as patient previously grew pseudomonas/Strep on 12/7    -f/u blood cultures: currently NGTD  -f/u resp cultures: currently NGTD  -f/u cultures, cell count, and cytology from [facility]    Coronary artery disease involving native coronary artery without angina pectoris - Non-obstructive 50% LAD  Hx of CAD and AICD placement.  HOME medication = diltiazem, carvedilol, hydralazine, irbesartan, clonidine and chlorthalidone   Patient appears volume up on exam on admission.      Plan:  -continue home medications    -strict I/O  -daily weights   -low Na diet        Rib fracture  Patient fell approx 2 weeks ago prior to her last admission.  She notes tenderness to palpation along the right side.   CT scan of the chest from 12/16: Displaced fracture of the posterolateral aspect of the right 6th rib.    Plan:  -tylenol 1g TID   -added tramadol PRN   -lidocaine 5% patches to affected area daily    Type 2 diabetes mellitus with  stage 3 chronic kidney disease, with long-term current use of insulin  Hx of IDDM.  Home medicine = insulin glargine qHs    Plan:  -continue insulin detemir to 10 units qHs  -BG improved today       Hyperlipidemia  Hx of HLD. Home medication = pravastatin PO    Plan:  -continue home meds      Essential hypertension  Hx of HTN.    Will continue home medications.    Asthma, chronic  Hx of chronic asthma. HOME medication = fluticasone furoate-vilanterol inhaler     Plan:  -continue home medication       VTE Risk Mitigation (From admission, onward)         Ordered     enoxaparin injection 30 mg  Daily      12/20/19 0954     IP VTE HIGH RISK PATIENT  Once      12/17/19 1824     Place sequential compression device  Until discontinued      12/17/19 1709                      Hamilton Lynch MD  Department of Hospital Medicine   [facility]

## 2019-12-20 NOTE — NURSING
Home Oxygen Evaluation    Date Performed: 2019    1) Patient's Home O2 Sat on room air, while at rest: 93        If O2 sats on room air at rest are 88% or below, patient qualifies. No additional testing needed. Document N/A in steps 2 and 3. If 89% or above, complete steps 2.      2) Patient's O2 Sat on room air while exercisin        If O2 sats on room air while exercising remain 89% or above patient does not qualify, no further testing needed Document N/A in step 3. If O2 sats on room air while exercising are 88% or below, continue to step 3.      3) Patient's O2 Sat while exercising on O2: at  LPM         (Must show improvement from #2 for patients to qualify)    If O2 sats improve on oxygen, patient qualifies for portable oxygen. If not, the patient does not qualify.

## 2019-12-20 NOTE — PLAN OF CARE
Patient will follow up in Pulmonology clinic. Was supposed to have appointment today, but was still inpatient. Follow-up as soon as patient is discharged. Will continue to pend results. Pulm will sign off for now. Please do not hesitate to reach out with any questions.

## 2019-12-20 NOTE — SUBJECTIVE & OBJECTIVE
Interval History: Patient feels generally much improved since the thoracentesis. No new complaints today and NAEON.  Patient has required only a minimal amount of O2 and in fact walked throughout the halls earlier today without O2 on for exercise. Will obtain an objective 6min WT today.  She continues to endorse a small amount of red-tinged sputum with her coughs as well continued right posterior rib pain, which she feels is well controlled with her current regimen.      Review of Systems   Constitutional: Negative for chills and fever.   Respiratory: Negative for shortness of breath.    Gastrointestinal: Negative for abdominal distention and abdominal pain.   Musculoskeletal:        Rib pain     Objective:     Vital Signs (Most Recent):  Temp: 98.2 °F (36.8 °C) (12/20/19 1552)  Pulse: 76 (12/20/19 1552)  Resp: 17 (12/20/19 1552)  BP: 136/65 (12/20/19 1552)  SpO2: (!) 91 % (12/20/19 1552) Vital Signs (24h Range):  Temp:  [96.2 °F (35.7 °C)-98.5 °F (36.9 °C)] 98.2 °F (36.8 °C)  Pulse:  [] 76  Resp:  [12-20] 17  SpO2:  [91 %-96 %] 91 %  BP: (136-187)/(64-75) 136/65     Weight: 69.4 kg (153 lb)  Body mass index is 27.1 kg/m².    Intake/Output Summary (Last 24 hours) at 12/20/2019 1633  Last data filed at 12/20/2019 0559  Gross per 24 hour   Intake 480 ml   Output --   Net 480 ml      Physical Exam   Constitutional: She is oriented to person, place, and time.   Patient is a 78 yo elderly F who appears alert, awake and in NAD.    HENT:   Head: Normocephalic and atraumatic.   Eyes: Pupils are equal, round, and reactive to light. EOM are normal.   Neck: Normal range of motion.   Cardiovascular: Normal rate, regular rhythm and normal heart sounds.   No murmur heard.  Pulmonary/Chest: Effort normal. No respiratory distress. She has no wheezes. She has no rales.   Decreased breath sounds noted on the RLL.     Abdominal: Soft. Bowel sounds are normal. She exhibits no distension. There is no tenderness.   Musculoskeletal:    Swelling of RUE   Neurological: She is alert and oriented to person, place, and time. No cranial nerve deficit.   Skin: Skin is warm and dry.   Psychiatric: She has a normal mood and affect.       Significant Labs: All pertinent labs within the past 24 hours have been reviewed.     Recent Labs   Lab 12/18/19  0516 12/18/19  1227 12/19/19  0419 12/20/19  0614   WBC 11.59  --  10.67 11.23   HGB 8.5*  --  9.0* 9.8*   HCT 27.7*  --  29.2* 33.6*   *  --  457* 502*   MCV 97  --  96 100*   RDW 12.7  --  12.7 12.7   * 131* 135* 137   K 4.0 3.9 3.8 4.2   CL 98 94* 99 101   CO2 26 27 27 27   BUN 27* 29* 27* 26*   CREATININE 1.5* 1.6* 1.4 1.4   GLU 47* 167*  167* 120* 149*   PROT 6.3 6.8  6.8 6.2 6.6   ALBUMIN 2.0* 2.2* 2.0* 2.2*   BILITOT 0.4 0.4 0.3 0.3   AST 25 23 19 25   ALKPHOS 96 102 94 105   ALT 19 19 17 19       Significant Imaging: I have reviewed all pertinent imaging results/findings within the past 24 hours.

## 2019-12-21 VITALS
WEIGHT: 152.13 LBS | BODY MASS INDEX: 26.95 KG/M2 | SYSTOLIC BLOOD PRESSURE: 167 MMHG | HEIGHT: 63 IN | OXYGEN SATURATION: 92 % | DIASTOLIC BLOOD PRESSURE: 73 MMHG | TEMPERATURE: 98 F | RESPIRATION RATE: 12 BRPM | HEART RATE: 93 BPM

## 2019-12-21 LAB
ALBUMIN SERPL BCP-MCNC: 2.2 G/DL (ref 3.5–5.2)
ALP SERPL-CCNC: 108 U/L (ref 55–135)
ALT SERPL W/O P-5'-P-CCNC: 22 U/L (ref 10–44)
ANION GAP SERPL CALC-SCNC: 9 MMOL/L (ref 8–16)
AST SERPL-CCNC: 33 U/L (ref 10–40)
BASOPHILS # BLD AUTO: 0.13 K/UL (ref 0–0.2)
BASOPHILS NFR BLD: 1.4 % (ref 0–1.9)
BILIRUB SERPL-MCNC: 0.2 MG/DL (ref 0.1–1)
BUN SERPL-MCNC: 28 MG/DL (ref 8–23)
CALCIUM SERPL-MCNC: 9.3 MG/DL (ref 8.7–10.5)
CHLORIDE SERPL-SCNC: 102 MMOL/L (ref 95–110)
CHOLEST FLD-MCNC: 43 MG/DL
CO2 SERPL-SCNC: 26 MMOL/L (ref 23–29)
CREAT SERPL-MCNC: 1.5 MG/DL (ref 0.5–1.4)
DIFFERENTIAL METHOD: ABNORMAL
EOSINOPHIL # BLD AUTO: 0.6 K/UL (ref 0–0.5)
EOSINOPHIL NFR BLD: 6.2 % (ref 0–8)
ERYTHROCYTE [DISTWIDTH] IN BLOOD BY AUTOMATED COUNT: 12.6 % (ref 11.5–14.5)
EST. GFR  (AFRICAN AMERICAN): 37.7 ML/MIN/1.73 M^2
EST. GFR  (NON AFRICAN AMERICAN): 32.7 ML/MIN/1.73 M^2
GLUCOSE SERPL-MCNC: 180 MG/DL (ref 70–110)
HCT VFR BLD AUTO: 30.8 % (ref 37–48.5)
HGB BLD-MCNC: 9.1 G/DL (ref 12–16)
IMM GRANULOCYTES # BLD AUTO: 0.16 K/UL (ref 0–0.04)
IMM GRANULOCYTES NFR BLD AUTO: 1.7 % (ref 0–0.5)
LYMPHOCYTES # BLD AUTO: 1.9 K/UL (ref 1–4.8)
LYMPHOCYTES NFR BLD: 19.9 % (ref 18–48)
MAGNESIUM SERPL-MCNC: 1.8 MG/DL (ref 1.6–2.6)
MCH RBC QN AUTO: 28.7 PG (ref 27–31)
MCHC RBC AUTO-ENTMCNC: 29.5 G/DL (ref 32–36)
MCV RBC AUTO: 97 FL (ref 82–98)
MONOCYTES # BLD AUTO: 1.2 K/UL (ref 0.3–1)
MONOCYTES NFR BLD: 12.2 % (ref 4–15)
NEUTROPHILS # BLD AUTO: 5.6 K/UL (ref 1.8–7.7)
NEUTROPHILS NFR BLD: 58.6 % (ref 38–73)
NRBC BLD-RTO: 0 /100 WBC
PHOSPHATE SERPL-MCNC: 4 MG/DL (ref 2.7–4.5)
PLATELET # BLD AUTO: 554 K/UL (ref 150–350)
PMV BLD AUTO: 10.4 FL (ref 9.2–12.9)
POCT GLUCOSE: 177 MG/DL (ref 70–110)
POCT GLUCOSE: 233 MG/DL (ref 70–110)
POTASSIUM SERPL-SCNC: 4.5 MMOL/L (ref 3.5–5.1)
PROT SERPL-MCNC: 6.5 G/DL (ref 6–8.4)
RBC # BLD AUTO: 3.17 M/UL (ref 4–5.4)
SODIUM SERPL-SCNC: 137 MMOL/L (ref 136–145)
SPECIMEN SOURCE: NORMAL
WBC # BLD AUTO: 9.49 K/UL (ref 3.9–12.7)

## 2019-12-21 PROCEDURE — 63600175 PHARM REV CODE 636 W HCPCS: Mod: HCNC | Performed by: STUDENT IN AN ORGANIZED HEALTH CARE EDUCATION/TRAINING PROGRAM

## 2019-12-21 PROCEDURE — 25000003 PHARM REV CODE 250: Mod: HCNC | Performed by: STUDENT IN AN ORGANIZED HEALTH CARE EDUCATION/TRAINING PROGRAM

## 2019-12-21 PROCEDURE — 99238 HOSP IP/OBS DSCHRG MGMT 30/<: CPT | Mod: HCNC,GC,, | Performed by: HOSPITALIST

## 2019-12-21 PROCEDURE — 84100 ASSAY OF PHOSPHORUS: CPT | Mod: HCNC

## 2019-12-21 PROCEDURE — 85025 COMPLETE CBC W/AUTO DIFF WBC: CPT | Mod: HCNC

## 2019-12-21 PROCEDURE — 83735 ASSAY OF MAGNESIUM: CPT | Mod: HCNC

## 2019-12-21 PROCEDURE — 99238 PR HOSPITAL DISCHARGE DAY,<30 MIN: ICD-10-PCS | Mod: HCNC,GC,, | Performed by: HOSPITALIST

## 2019-12-21 PROCEDURE — 36415 COLL VENOUS BLD VENIPUNCTURE: CPT | Mod: HCNC

## 2019-12-21 PROCEDURE — 80053 COMPREHEN METABOLIC PANEL: CPT | Mod: HCNC

## 2019-12-21 PROCEDURE — 94761 N-INVAS EAR/PLS OXIMETRY MLT: CPT | Mod: HCNC

## 2019-12-21 RX ORDER — CLONIDINE 0.1 MG/24H
1 PATCH, EXTENDED RELEASE TRANSDERMAL
Qty: 4 PATCH | Refills: 11
Start: 2019-12-21 | End: 2020-02-24

## 2019-12-21 RX ORDER — INSULIN GLARGINE 100 [IU]/ML
15 INJECTION, SOLUTION SUBCUTANEOUS NIGHTLY
Qty: 45 ML | Refills: 6
Start: 2019-12-21 | End: 2020-02-20 | Stop reason: SDUPTHER

## 2019-12-21 RX ORDER — LEVOFLOXACIN 750 MG/1
750 TABLET ORAL DAILY
Qty: 9 TABLET | Refills: 0 | Status: SHIPPED | OUTPATIENT
Start: 2019-12-21 | End: 2019-12-30

## 2019-12-21 RX ADMIN — HYDRALAZINE HYDROCHLORIDE 100 MG: 25 TABLET, FILM COATED ORAL at 08:12

## 2019-12-21 RX ADMIN — CARVEDILOL 25 MG: 25 TABLET, FILM COATED ORAL at 08:12

## 2019-12-21 RX ADMIN — FLUTICASONE FUROATE AND VILANTEROL TRIFENATATE 1 PUFF: 100; 25 POWDER RESPIRATORY (INHALATION) at 08:12

## 2019-12-21 RX ADMIN — INSULIN ASPART 2 UNITS: 100 INJECTION, SOLUTION INTRAVENOUS; SUBCUTANEOUS at 11:12

## 2019-12-21 RX ADMIN — INSULIN ASPART 2 UNITS: 100 INJECTION, SOLUTION INTRAVENOUS; SUBCUTANEOUS at 08:12

## 2019-12-21 RX ADMIN — CEFEPIME 2 G: 2 INJECTION, POWDER, FOR SOLUTION INTRAVENOUS at 11:12

## 2019-12-21 RX ADMIN — ASPIRIN 81 MG: 81 TABLET, COATED ORAL at 08:12

## 2019-12-21 RX ADMIN — DILTIAZEM HYDROCHLORIDE 120 MG: 120 CAPSULE, COATED, EXTENDED RELEASE ORAL at 08:12

## 2019-12-21 RX ADMIN — PRAVASTATIN SODIUM 40 MG: 10 TABLET ORAL at 08:12

## 2019-12-21 RX ADMIN — CLONIDINE 1 PATCH: 0.1 PATCH TRANSDERMAL at 08:12

## 2019-12-21 NOTE — SUBJECTIVE & OBJECTIVE
Interval History: Patient states that she continues to feel improved. She elicits no complaints.  She is no longer requiring the O2 after passing her 6 min walk test yesterday.     Review of Systems   Constitutional: Negative for chills and fever.   HENT: Negative for congestion and rhinorrhea.    Respiratory: Negative for cough and shortness of breath.    Cardiovascular: Negative for chest pain and leg swelling.   Gastrointestinal: Negative for abdominal distention, abdominal pain, nausea and vomiting.     Objective:     Vital Signs (Most Recent):  Temp: 97.7 °F (36.5 °C) (12/21/19 1129)  Pulse: 93 (12/21/19 1129)  Resp: 12 (12/21/19 1129)  BP: (!) 167/73 (12/21/19 1129)  SpO2: (!) 92 % (12/21/19 1129) Vital Signs (24h Range):  Temp:  [96.3 °F (35.7 °C)-98 °F (36.7 °C)] 97.7 °F (36.5 °C)  Pulse:  [] 93  Resp:  [12-20] 12  SpO2:  [92 %-95 %] 92 %  BP: (157-185)/(69-77) 167/73     Weight: 69 kg (152 lb 1.9 oz)  Body mass index is 26.95 kg/m².    Intake/Output Summary (Last 24 hours) at 12/21/2019 1607  Last data filed at 12/21/2019 0522  Gross per 24 hour   Intake 240 ml   Output --   Net 240 ml      Physical Exam   Constitutional: She is oriented to person, place, and time.   Patient is a 80 yo elderly F who appears alert, awake and in NAD.    HENT:   Head: Normocephalic and atraumatic.   Eyes: Pupils are equal, round, and reactive to light. EOM are normal.   Neck: Normal range of motion.   Cardiovascular: Normal rate, regular rhythm and normal heart sounds.   No murmur heard.  Pulmonary/Chest: Effort normal. No respiratory distress. She has no wheezes. She has no rales.        Abdominal: Soft. Bowel sounds are normal. She exhibits no distension. There is no tenderness.   Neurological: She is alert and oriented to person, place, and time. No cranial nerve deficit.   Skin: Skin is warm and dry.   Psychiatric: She has a normal mood and affect.

## 2019-12-21 NOTE — DISCHARGE SUMMARY
[facility] Hospital Medicine  Discharge Summary      Patient Name: Myesha Quinones  MRN: [id number]  Admission Date: 12/17/2019  Hospital Length of Stay: 4 days  Discharge Date and Time:  12/21/2019 4:12 PM  Attending Physician: No att. providers found   Discharging Provider: Hamilton Lynch MD  Primary Care Provider: Emelina Gaston MD  [facility] Medicine Team: [facility] 1 Hamilton Lynch MD    HPI:   Patient is a 78 yo F with a PMH HTN, DM, CAD and CHF s/p ICD, asthma, and remote hx of breast cancer as well as BCC who presents to the ED with increased weakness, chest pain and increased shortness of breath.  Patient was recently discharged from the hospital after a stay from 12/7 to 12/10.  She was admitted due to concern for sepsis and was found to have a RLL pneumonia.  Her respiraotry cultures at that time grew pseudomonas.  Patient received a full course of antibiotics.  She states that she felt better for 2 days following discharge but started to develop worsening shortness of breath and weakness again over the last 5 days.  Patient also notes coughing a small amount of red tinged mucus with her meal today.  She saw her PCP yesterday who noted a leukocytosis of 20k on labs and started her on Levaquin and Doxycycline.  She also ordered the patient a CT scan of the chest, which was preformed the day of her admission and noted a worsening right middle and lower lobe consolidation concerning for pneumonia.  Patient's PCP instructed her to present to the hospital for further evaluation.   Of note, patient fell prior to her previous admission due to the weakness from the last PNA, which resulted in a slightly displaced fracture identified of her right 6th rib.  Patient has been using lidocaine patches at home for the pain, which have provided minimal relief.  She denies any fevers, chills, nausea, vomiting, or abdominal pain.          * No surgery found *      Hospital Course:   In the ED,  patient was noted to be afebrile and HD stable.  Her labs were remarkable for a WBC of 17k.  A lactic acid, procal and troponins were all WNL.  Her BNP was noted to be 156.  She was started on broad spectrum antibiotics with Vanc and Zosyn IV.  Influenza swab was negative.  Blood cultures were obtained.  Patient was admitted to hospital medicine for further evaluation.  She was started on Cefepime, Azithromycin and metronidazole IV that curtailed to solely Cefepime IV the following day as she was culture (+) for pseudomonas her previous admission 1 week prior.  Pulmonology was consulted given her history of breast cancer 20 years ago requiring mastectomy on the right side as there is some concern for a post-obstructive PNA.  Pulm preformed a thoracentesis on 12/18 which drained 1200 mL.  Pleural fluid was sent for cytology, cell count and culture. Results show appears to be exudative, cell cytology still pending as concern for possible malignancy given breast cancer history. Continuing Cefepime for Pseudomonal/Strep pneumoniae PNA.   6min walk test proved no need for home oxygen as patient's sats stayed above 93%.  Patient's cytology from her thora showed no malignant cells, which indicates that her PNA is likely not post obstructive.  D/c patient's IV cefepime (received 5 days) and will discharge patient with PO Levaquin for 9 days in order to complete a 14 day course for recurrent PNA.   Upon discharge patient is afebrile without leukocytosis and feels overall clinically improved.  Ordered an ambulatory referral to pulmonology and ID for outpatient follow ups given patient's recurrent PNA and large para pneumonic effusion for further management.      Interval History: Patient states that she continues to feel improved. She elicits no complaints.  She is no longer requiring the O2 after passing her 6 min walk test yesterday.     Review of Systems   Constitutional: Negative for chills and fever.   HENT: Negative for  congestion and rhinorrhea.    Respiratory: Negative for cough and shortness of breath.    Cardiovascular: Negative for chest pain and leg swelling.   Gastrointestinal: Negative for abdominal distention, abdominal pain, nausea and vomiting.     Objective:     Vital Signs (Most Recent):  Temp: 97.7 °F (36.5 °C) (12/21/19 1129)  Pulse: 93 (12/21/19 1129)  Resp: 12 (12/21/19 1129)  BP: (!) 167/73 (12/21/19 1129)  SpO2: (!) 92 % (12/21/19 1129) Vital Signs (24h Range):  Temp:  [96.3 °F (35.7 °C)-98 °F (36.7 °C)] 97.7 °F (36.5 °C)  Pulse:  [] 93  Resp:  [12-20] 12  SpO2:  [92 %-95 %] 92 %  BP: (157-185)/(69-77) 167/73     Weight: 69 kg (152 lb 1.9 oz)  Body mass index is 26.95 kg/m².    Intake/Output Summary (Last 24 hours) at 12/21/2019 1607  Last data filed at 12/21/2019 0522  Gross per 24 hour   Intake 240 ml   Output --   Net 240 ml      Physical Exam   Constitutional: She is oriented to person, place, and time.   Patient is a 80 yo elderly F who appears alert, awake and in NAD.    HENT:   Head: Normocephalic and atraumatic.   Eyes: Pupils are equal, round, and reactive to light. EOM are normal.   Neck: Normal range of motion.   Cardiovascular: Normal rate, regular rhythm and normal heart sounds.   No murmur heard.  Pulmonary/Chest: Effort normal. No respiratory distress. She has no wheezes. She has no rales.      Abdominal: Soft. Bowel sounds are normal. She exhibits no distension. There is no tenderness.   Neurological: She is alert and oriented to person, place, and time. No cranial nerve deficit.   Skin: Skin is warm and dry.   Psychiatric: She has a normal mood and affect.     Consults:   Consults (From admission, onward)        Status Ordering Provider     Inpatient consult to Pulmonology  Once     Provider:  (Not yet assigned)    ANNA Allred          No new Assessment & Plan notes have been filed under this hospital service since the last note was generated.  Service: Hospital  Medicine    Final Active Diagnoses:    Diagnosis Date Noted POA    PRINCIPAL PROBLEM:  CAP (community acquired pneumonia) due to Pseudomonas species [J15.1] 12/17/2019 Yes    Coronary artery disease involving native coronary artery without angina pectoris - Non-obstructive 50% LAD [I25.10] 07/20/2015 Yes    Parapneumonic effusion [J18.9, J91.8] 12/18/2019 Yes    Rib fracture [S22.39XA] 12/17/2019 Yes    Type 2 diabetes mellitus with stage 3 chronic kidney disease, with long-term current use of insulin [E11.22, N18.3, Z79.4] 01/26/2017 Not Applicable    History of breast cancer [Z85.3] 02/02/2016 Not Applicable    Hyperlipidemia [E78.5] 01/07/2016 Yes    Essential hypertension [I10] 07/20/2015 Yes    Asthma, chronic [J45.909] 06/05/2013 Yes      Problems Resolved During this Admission:    Diagnosis Date Noted Date Resolved POA    Acute hypoxemic respiratory failure [J96.01] 12/19/2019 12/20/2019 Yes       Discharged Condition: fair    Disposition: Home or Self Care    Follow Up:    Patient Instructions:      Ambulatory Referral to Pulmonology   Referral Priority: Urgent Referral Type: Consultation   Referral Reason: Specialty Services Required   Requested Specialty: Pulmonary Disease   Number of Visits Requested: 1     Ambulatory Referral to Infectious Disease   Referral Priority: Urgent Referral Type: Consultation   Referral Reason: Specialty Services Required   Requested Specialty: Infectious Diseases   Number of Visits Requested: 1       Significant Diagnostic Studies: Labs:   CMP   Recent Labs   Lab 12/20/19  0614 12/21/19  0330    137   K 4.2 4.5    102   CO2 27 26   * 180*   BUN 26* 28*   CREATININE 1.4 1.5*   CALCIUM 9.3 9.3   PROT 6.6 6.5   ALBUMIN 2.2* 2.2*   BILITOT 0.3 0.2   ALKPHOS 105 108   AST 25 33   ALT 19 22   ANIONGAP 9 9   ESTGFRAFRICA 40.9* 37.7*   EGFRNONAA 35.5* 32.7*    and CBC   Recent Labs   Lab 12/20/19  0614 12/21/19  0330   WBC 11.23 9.49   HGB 9.8* 9.1*   HCT  33.6* 30.8*   * 554*       Pending Diagnostic Studies:     Procedure Component Value Units Date/Time    X-Ray Chest 1 View [[id number]] Resulted:  12/18/19 1456    Order Status:  Sent Lab Status:  In process Updated:  12/18/19 1457         Medications:  Reconciled Home Medications:      Medication List      CHANGE how you take these medications    blood sugar diagnostic Strp  Commonly known as:  Accu-Chek Angelica  Uses Accu-Check Angelica meter to test BG 4x/day  What changed:  additional instructions     cloNIDine 0.1 mg/24 hr td ptwk 0.1 mg/24 hr  Commonly known as:  CATAPRES  Place 1 patch onto the skin every 7 days. (Saturdays)  What changed:  additional instructions     insulin glargine 100 units/mL (3mL) SubQ pen  Commonly known as:  Lantus Solostar U-100 Insulin  Inject 15 Units into the skin every evening.  What changed:  how much to take     lancets Misc  Commonly known as:  Accu-Chek Softclix Lancets  Uses Accu-Chek Angelica meter to test BG 4x/day  What changed:  additional instructions     levoFLOXacin 750 MG tablet  Commonly known as:  LEVAQUIN  Take 1 tablet (750 mg total) by mouth once daily. for 9 days  What changed:    · medication strength  · how much to take  · when to take this        CONTINUE taking these medications    acetaminophen 500 MG tablet  Commonly known as:  TYLENOL  Take 1,000 mg by mouth daily as needed for Pain.     B-12 DOTS ORAL  Take 1 tablet by mouth once daily.     benzonatate 100 MG capsule  Commonly known as:  Tessalon Perles  Take 2 capsules (200 mg total) by mouth 3 (three) times daily as needed for Cough.     carvedilol 25 MG tablet  Commonly known as:  COREG  Take 1 tablet (25 mg total) by mouth 2 (two) times daily with meals.     chlorthalidone 25 MG Tab  Commonly known as:  HYGROTEN  Take 1 tablet (25 mg total) by mouth once daily.     diltiaZEM 120 MG Cp24  Commonly known as:  CARDIZEM CD  Take 1 capsule (120 mg total) by mouth once daily.     Enteric Coated Aspirin 81  "MG EC tablet  Generic drug:  aspirin  Take 1 tablet by mouth once daily.     Fish Oil 500 mg Cap  Generic drug:  omega-3 fatty acids  Take 1 capsule by mouth Twice daily.     fluticasone propionate 50 mcg/actuation nasal spray  Commonly known as:  FLONASE  2 sprays (100 mcg total) by Each Nare route once daily.     hydrALAZINE 100 MG tablet  Commonly known as:  APRESOLINE  Take 1 tablet (100 mg total) by mouth 3 (three) times daily.     irbesartan 300 MG tablet  Commonly known as:  AVAPRO  Take 1 tablet (300 mg total) by mouth every evening.     levocetirizine 5 MG tablet  Commonly known as:  XYZAL  Take 1 tablet (5 mg total) by mouth every evening.     lidocaine 5 %  Commonly known as:  LIDODERM  Place 1 patch onto the skin once daily. Place patch to right back. Leave on for 12 hours and remove for 12 hours.     linaGLIPtin 5 mg Tab tablet  Commonly known as:  Tradjenta  Take 1 tablet (5 mg total) by mouth once daily.     magnesium oxide 400 mg (241.3 mg magnesium) tablet  Commonly known as:  MAG-OX  Take 1 tablet (400 mg total) by mouth once daily.     metFORMIN 500 MG tablet  Commonly known as:  GLUCOPHAGE  Take 1 tablet (500 mg total) by mouth 2 (two) times daily with meals.     nitroGLYCERIN 0.4 MG SL tablet  Commonly known as:  NITROSTAT  Place 0.4 mg under the tongue every 5 (five) minutes as needed for Chest pain.     ondansetron 4 MG Tbdl  Commonly known as:  ZOFRAN-ODT  Take 1 tablet (4 mg total) by mouth every 6 (six) hours as needed.     pen needle, diabetic 31 gauge x 3/16" Ndle  Commonly known as:  BD Ultra-Fine Mini Pen Needle  USE WITH LANTUS AT BEDTIME     pravastatin 40 MG tablet  Commonly known as:  PRAVACHOL  Take 1 tablet (40 mg total) by mouth once daily.     traMADol 50 mg tablet  Commonly known as:  ULTRAM  Take 1 tablet (50 mg total) by mouth nightly as needed for Pain. New right thoracic apin     Ventolin HFA 90 mcg/actuation inhaler  Generic drug:  albuterol  INHALE 2 PUFFS INTO THE LUNGS " EVERY 6 (SIX) HOURS AS NEEDED FOR WHEEZING. RESCUE     Vitamin D3 2,000 unit Tab  Generic drug:  cholecalciferol (vitamin D3)  Take 1 tablet by mouth once daily.     Wixela Inhub 250-50 mcg/dose diskus inhaler  Generic drug:  fluticasone-salmeterol 250-50 mcg/dose  Inhale 1 puff into the lungs 2 (two) times daily. This is a change from one month        STOP taking these medications    doxycycline 100 MG tablet  Commonly known as:  VIBRA-TABS            Indwelling Lines/Drains at time of discharge:   Lines/Drains/Airways     None                 Time spent on the discharge of patient: 20 minutes  Patient was seen and examined on the date of discharge and determined to be suitable for discharge.         Hamilton Lynch MD  Department of Hospital Medicine  [facility]

## 2019-12-21 NOTE — PLAN OF CARE
PLAN OF CARE REVIEWED WITH PT.  PT AA+OX4.  ABLE TO MAKE NEEDS KNOWN.  DOES NOT APPEAR TO BE IN ANY DISTRESS.  NO C/O PAIN.  WILL CONTINUE TO REASSESS PAIN.  REQUIRES STAND BY ASSIST WITH ADLS.  CONT. OF B/B.  PT REMAINS FREE FROM FALLS, INJURY, AND TRAUMA.  FALL PRECAUTIONS IN PLACE.  BED IN LOWEST POSITION WITH WHEELS LOCKED.  CALL LIGHT WITHIN REACH.  WILL CONTINUE TO MONITOR.

## 2019-12-22 LAB
BACTERIA BLD CULT: NORMAL
BACTERIA BLD CULT: NORMAL

## 2019-12-23 LAB — BACTERIA FLD CULT: NORMAL

## 2019-12-23 NOTE — PLAN OF CARE
Patient discharged home with family.    Future Appointments   Date Time Provider Department Center   12/24/2019  9:30 AM Emelina Gaston MD [facility] IM [address] PCW   12/30/2019  9:30 AM Emelina Gaston MD [facility] IM [address] PCW   1/2/2020  8:10 AM CARRIE Arechiga MD [facility] OPHTHAL [place]   1/9/2020  7:40 AM Diaz Roche MD [facility] DERMSUR [address]   1/17/2020  8:00 AM LAB, JORGE [facility] LAB [place]   1/17/2020  8:15 [facility] [place]   1/20/2020  8:00 AM Emelina Gaston MD [facility] IM [address] PCW   2/5/2020 10:20 AM Chinedu Castillo MD [facility] NEPHRO [address]   2/13/2020  8:00 AM LAB, JORGE KEN LAB [place]   2/18/2020 10:00 AM HOME MONITOR DEVICE CHECK, [facility] NOM ARRHPRO [address]   2/20/2020 10:00 AM Veronica Nick NP [facility] ENDODIA [address]   3/11/2020  1:30 PM Mary Magaña DPM [facility] PODIAT [facility]   3/12/2020  9:20 AM Erika Nguyen MD [facility] DERM [address]        12/23/19 0955   Final Note   Assessment Type Final Discharge Note   Anticipated Discharge Disposition Home   Right Care Referral Info   Post Acute Recommendation No Care

## 2019-12-24 ENCOUNTER — OFFICE VISIT (OUTPATIENT)
Dept: INTERNAL MEDICINE | Facility: CLINIC | Age: 79
End: 2019-12-24
Payer: MEDICARE

## 2019-12-24 VITALS
OXYGEN SATURATION: 95 % | BODY MASS INDEX: 26.95 KG/M2 | HEART RATE: 91 BPM | DIASTOLIC BLOOD PRESSURE: 50 MMHG | SYSTOLIC BLOOD PRESSURE: 132 MMHG | HEIGHT: 63 IN

## 2019-12-24 DIAGNOSIS — J15.9 BACTERIAL PNEUMONIA: ICD-10-CM

## 2019-12-24 DIAGNOSIS — Z09 HOSPITAL DISCHARGE FOLLOW-UP: Primary | ICD-10-CM

## 2019-12-24 DIAGNOSIS — E11.3213 CONTROLLED TYPE 2 DIABETES MELLITUS WITH BOTH EYES AFFECTED BY MILD NONPROLIFERATIVE RETINOPATHY AND MACULAR EDEMA, WITH LONG-TERM CURRENT USE OF INSULIN: ICD-10-CM

## 2019-12-24 DIAGNOSIS — Z79.4 CONTROLLED TYPE 2 DIABETES MELLITUS WITH BOTH EYES AFFECTED BY MILD NONPROLIFERATIVE RETINOPATHY AND MACULAR EDEMA, WITH LONG-TERM CURRENT USE OF INSULIN: ICD-10-CM

## 2019-12-24 DIAGNOSIS — E78.5 HYPERLIPIDEMIA, UNSPECIFIED HYPERLIPIDEMIA TYPE: ICD-10-CM

## 2019-12-24 DIAGNOSIS — G47.33 OSA (OBSTRUCTIVE SLEEP APNEA): ICD-10-CM

## 2019-12-24 DIAGNOSIS — I10 ESSENTIAL HYPERTENSION: ICD-10-CM

## 2019-12-24 DIAGNOSIS — J45.20 MILD INTERMITTENT CHRONIC ASTHMA WITHOUT COMPLICATION: ICD-10-CM

## 2019-12-24 PROCEDURE — 1125F PR PAIN SEVERITY QUANTIFIED, PAIN PRESENT: ICD-10-PCS | Mod: HCNC,S$GLB,, | Performed by: INTERNAL MEDICINE

## 2019-12-24 PROCEDURE — 3078F PR MOST RECENT DIASTOLIC BLOOD PRESSURE < 80 MM HG: ICD-10-PCS | Mod: HCNC,CPTII,S$GLB, | Performed by: INTERNAL MEDICINE

## 2019-12-24 PROCEDURE — 1101F PR PT FALLS ASSESS DOC 0-1 FALLS W/OUT INJ PAST YR: ICD-10-PCS | Mod: HCNC,CPTII,S$GLB, | Performed by: INTERNAL MEDICINE

## 2019-12-24 PROCEDURE — 1101F PT FALLS ASSESS-DOCD LE1/YR: CPT | Mod: HCNC,CPTII,S$GLB, | Performed by: INTERNAL MEDICINE

## 2019-12-24 PROCEDURE — 1125F AMNT PAIN NOTED PAIN PRSNT: CPT | Mod: HCNC,S$GLB,, | Performed by: INTERNAL MEDICINE

## 2019-12-24 PROCEDURE — 99999 PR PBB SHADOW E&M-EST. PATIENT-LVL III: ICD-10-PCS | Mod: PBBFAC,HCNC,, | Performed by: INTERNAL MEDICINE

## 2019-12-24 PROCEDURE — 3075F PR MOST RECENT SYSTOLIC BLOOD PRESS GE 130-139MM HG: ICD-10-PCS | Mod: HCNC,CPTII,S$GLB, | Performed by: INTERNAL MEDICINE

## 2019-12-24 PROCEDURE — 1159F MED LIST DOCD IN RCRD: CPT | Mod: HCNC,S$GLB,, | Performed by: INTERNAL MEDICINE

## 2019-12-24 PROCEDURE — 99214 OFFICE O/P EST MOD 30 MIN: CPT | Mod: HCNC,S$GLB,, | Performed by: INTERNAL MEDICINE

## 2019-12-24 PROCEDURE — 99999 PR PBB SHADOW E&M-EST. PATIENT-LVL III: CPT | Mod: PBBFAC,HCNC,, | Performed by: INTERNAL MEDICINE

## 2019-12-24 PROCEDURE — 99214 PR OFFICE/OUTPT VISIT, EST, LEVL IV, 30-39 MIN: ICD-10-PCS | Mod: HCNC,S$GLB,, | Performed by: INTERNAL MEDICINE

## 2019-12-24 PROCEDURE — 1159F PR MEDICATION LIST DOCUMENTED IN MEDICAL RECORD: ICD-10-PCS | Mod: HCNC,S$GLB,, | Performed by: INTERNAL MEDICINE

## 2019-12-24 PROCEDURE — 3075F SYST BP GE 130 - 139MM HG: CPT | Mod: HCNC,CPTII,S$GLB, | Performed by: INTERNAL MEDICINE

## 2019-12-24 PROCEDURE — 3078F DIAST BP <80 MM HG: CPT | Mod: HCNC,CPTII,S$GLB, | Performed by: INTERNAL MEDICINE

## 2019-12-24 NOTE — PROGRESS NOTES
Subjective:      Patient ID: Myesha Quinones is a 79 y.o. female.    Chief Complaint: Follow-up    HPI:  HPI     [facility] Medicine  Discharge Summary        Patient Name: Myesha Quinones  MRN: [id number]  Admission Date: 12/17/2019  Hospital Length of Stay: 4 days  Discharge Date and Time:  12/21/2019 4:12 PM  Attending Physician: No att. providers found   Discharging Provider: Hamilton Lynch MD  Primary Care Provider: Emelina Gaston MD  [facility] Medicine Team: [facility] 1 Hamilton Lynch MD     HPI:   Patient is a 78 yo F with a PMH HTN, DM, CAD and CHF s/p ICD, asthma, and remote hx of breast cancer as well as BCC who presents to the ED with increased weakness, chest pain and increased shortness of breath.  Patient was recently discharged from the hospital after a stay from 12/7 to 12/10.  She was admitted due to concern for sepsis and was found to have a RLL pneumonia.  Her respiraotry cultures at that time grew pseudomonas.  Patient received a full course of antibiotics.  She states that she felt better for 2 days following discharge but started to develop worsening shortness of breath and weakness again over the last 5 days.  Patient also notes coughing a small amount of red tinged mucus with her meal today.  She saw her PCP yesterday who noted a leukocytosis of 20k on labs and started her on Levaquin and Doxycycline.  She also ordered the patient a CT scan of the chest, which was preformed the day of her admission and noted a worsening right middle and lower lobe consolidation concerning for pneumonia.  Patient's PCP instructed her to present to the hospital for further evaluation.   Of note, patient fell prior to her previous admission due to the weakness from the last PNA, which resulted in a slightly displaced fracture identified of her right 6th rib.  Patient has been using lidocaine patches at home for the pain, which have provided minimal relief.  She denies any  fevers, chills, nausea, vomiting, or abdominal pain.            * No surgery found *       Hospital Course:   In the ED, patient was noted to be afebrile and HD stable.  Her labs were remarkable for a WBC of 17k.  A lactic acid, procal and troponins were all WNL.  Her BNP was noted to be 156.  She was started on broad spectrum antibiotics with Vanc and Zosyn IV.  Influenza swab was negative.  Blood cultures were obtained.  Patient was admitted to hospital medicine for further evaluation.  She was started on Cefepime, Azithromycin and metronidazole IV that curtailed to solely Cefepime IV the following day as she was culture (+) for pseudomonas her previous admission 1 week prior.  Pulmonology was consulted given her history of breast cancer 20 years ago requiring mastectomy on the right side as there is some concern for a post-obstructive PNA.  Pulm preformed a thoracentesis on 12/18 which drained 1200 mL.  Pleural fluid was sent for cytology, cell count and culture. Results show appears to be exudative, cell cytology still pending as concern for possible malignancy given breast cancer history. Continuing Cefepime for Pseudomonal/Strep pneumoniae PNA.   6min walk test proved no need for home oxygen as patient's sats stayed above 93%.  Patient's cytology from her thora showed no malignant cells, which indicates that her PNA is likely not post obstructive.  D/c patient's IV cefepime (received 5 days) and will discharge patient with PO Levaquin for 9 days in order to complete a 14 day course for recurrent PNA.   Upon discharge patient is afebrile without leukocytosis and feels overall clinically improved.  Ordered an ambulatory referral to pulmonology and ID for outpatient follow ups given patient's recurrent PNA and large para pneumonic effusion for further management.       Interval History: Patient states that she continues to feel improved. She elicits no complaints.  She is no longer requiring the O2 after passing  her 6 min walk test yesterday.       ------------------------------------------------------------------------------------------------------------------  Today patient feels much better.  She has no evidence of a fever.  She reports no difficulty breathing and has not noticed any wheezing.  She is completing the Levaquin for a total of a 14 day course she was discharged on 9 days of the medicine.  I reviewed her last laboratory studies and reminded her that she is still anemic and will likely feel tired because of this.    Review of Systems   Patient Active Problem List   Diagnosis    History of nonmelanoma skin cancer    Nonischemic cardiomyopathy    LBBB (left bundle branch block)    Nontoxic multinodular goiter    Meningioma    Asthma, chronic    Biventricular ICD (implantable cardioverter-defibrillator) in place    Tremor    Coronary artery disease involving native coronary artery without angina pectoris - Non-obstructive 50% LAD    Essential hypertension    Hyperlipidemia    History of breast cancer    Adrenal cortical nodule: repeat CT 6/2016    Calcification of aorta    Diabetic polyneuropathy associated with type 2 diabetes mellitus    Osteoporosis    KHOA (obstructive sleep apnea)    Type 2 diabetes mellitus with stage 3 chronic kidney disease, with long-term current use of insulin    Chronic kidney disease, stage 3, mod decreased GFR    Iron deficiency anemia    Chemotherapy-induced neuropathy    Hypomagnesemia    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, with long-term current use of insulin    Hypertensive retinopathy, bilateral    Multiple lung nodules    COPD (chronic obstructive pulmonary disease)    Mixed conductive and sensorineural hearing loss    ICD (implantable cardioverter-defibrillator) battery depletion    Type 2 DM with CKD stage 3 and hypertension    Cardiomyopathy, ischemic    Metabolic  bone disease    Proteinuria    Seasonal allergies    Pseudomonas pneumonia    VIRGILIO (acute kidney injury)    Acute right-sided thoracic back pain    CAP (community acquired pneumonia) due to Pseudomonas species    Rib fracture    Parapneumonic effusion     Past Medical History:   Diagnosis Date    Acute hypoxemic respiratory failure 12/19/2019    Acute right-sided thoracic back pain 12/9/2019    Allergy     Asthma     Basal cell carcinoma     left forehead    Basal cell carcinoma     left nose    Basal cell carcinoma 05/20/2015    right nose    Basal cell carcinoma 12/22/2015    left lower post neck    Breast cancer     CAD (coronary artery disease)     Cardiomyopathy     Cardiomyopathy, ischemic     Cataract     CHF (congestive heart failure)     Chronic kidney disease, stage 3, mod decreased GFR 2/14/2017    Colon polyp 2011    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, with long-term current use of insulin 2/22/2018    COPD (chronic obstructive pulmonary disease)     COPD exacerbation 4/8/2018    Defibrillator discharge     Diabetes mellitus     Diabetes mellitus type II     Diabetes with neurologic complications     Goiter     MNG    HX: breast cancer     Hyperlipidemia     Hypertension     Iron deficiency anemia 5/16/2017    Left kidney mass     Meningioma     Osteoporosis, postmenopausal     Pacemaker     Pneumonia 12/8/2019    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    Pseudomonas pneumonia     Skin cancer     s/p excision    Sleep apnea     CPAP    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/2015    mid forehead    Unspecified vitamin D deficiency     Ventricular tachycardia     Vitamin B12 deficiency     Vitamin D deficiency disease      Past Surgical History:   Procedure Laterality Date    BASAL CELL CARCINOMA EXCISION      posterior neck and nose    BREAST BIOPSY      BREAST CYST EXCISION  Left     BREAST SURGERY      CARDIAC DEFIBRILLATOR PLACEMENT      x 2    CATARACT EXTRACTION W/  INTRAOCULAR LENS IMPLANT Bilateral     CHOLECYSTECTOMY      COLONOSCOPY N/A 11/5/2019    Procedure: COLONOSCOPY;  Surgeon: Boaz Botello MD;  Location: [facility] END[facility] ([address] [place] [address]);  Service: Endoscopy;  Laterality: N/A;  AICD - Med[employer] - sm    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    FRACTURE SURGERY      left elbow and wrist as a child    HYSTERECTOMY      MASTECTOMY Right     REPLACEMENT OF IMPLANTABLE CARDIOVERTER-DEFIBRILLATOR (ICD) GENERATOR N/A 12/17/2018    Procedure: REPLACEMENT, ICD GENERATOR;  Surgeon: Jan Mckeon MD;  Location: [facility] EP LAB;  Service: Cardiology;  Laterality: N/A;  DONNA, CRT-D gen change, MDT, MAC, SK, 3 Prep    REVISION OF SKIN POCKET FOR CARDIOVERTER-DEFIBRILLATOR  12/17/2018    Procedure: Revision, Skin Pocket, For Cardioverter-Defibrillator;  Surgeon: Jan Mckeon MD;  Location: [facility] EP [facility];  Service: Cardiology;;    SQUAMOUS CELL CARCINOMA EXCISION      remved from forehead    TONSILLECTOMY       Family History   Problem Relation Age of Onset    Diabetes Father     Heart disease Father     Diabetes Sister     Heart disease Sister     Diabetes Brother     Heart disease Brother     Hypertension Brother     Diabetes Brother     Heart disease Brother     Hypertension Brother     Diabetes Brother     Heart disease Brother     Cancer Brother         colon    Diabetes Brother     Cancer Son         skin    Diabetes Son         prediabetes    Diabetes Daughter         prediabetes    Cancer Daughter         melanoma    Obesity Daughter     Melanoma Daughter     Diabetes Son     Asthma Mother     Hypertension Mother     Stroke Mother     No Known Problems Maternal Grandmother     No Known Problems Maternal Grandfather     No Known Problems Paternal Grandmother     No Known Problems Paternal Grandfather     Amblyopia Neg Hx     Blindness Neg Hx      "Cataracts Neg Hx     Glaucoma Neg Hx     Macular degeneration Neg Hx     Retinal detachment Neg Hx     Strabismus Neg Hx     Thyroid disease Neg Hx      Review of Systems   No wheezing, shortness of breath or chest pain or abdominal pain  Objective:     Vitals:    12/24/19 0916   BP: (!) 132/50   Pulse: 91   SpO2: 95%   Height: 5' 3" (1.6 m)   PainSc:   4     Body mass index is 26.95 kg/m².  Physical Exam   Constitutional: She is oriented to person, place, and time. She appears well-developed and well-nourished. No distress.   Neck: Carotid bruit is not present. No thyromegaly present.   Cardiovascular: Normal rate, regular rhythm and normal heart sounds. PMI is not displaced.   Pulmonary/Chest: Effort normal and breath sounds normal. No respiratory distress.   Abdominal: Soft. Bowel sounds are normal. She exhibits no distension. There is no tenderness.   Musculoskeletal: She exhibits no edema.   Neurological: She is alert and oriented to person, place, and time.     Assessment:     1. Hospital discharge follow-up    2. Bacterial pneumonia    3. Mild intermittent chronic asthma without complication    4. 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, with long-term current use of insulin    5. Essential hypertension    6. Hyperlipidemia, unspecified hyperlipidemia type    7. KHOA (obstructive sleep apnea)      Plan:   Myesha was seen today for follow-up.    Diagnoses and all orders for this visit:    Hospital discharge follow-up:  Review discharge summary    Bacterial pneumonia:  Completed antibiotics and 1 week later obtain laboratory study as well as chest x-ray and a repeat appointment with me  -     CBC auto differential; Future  -     Comprehensive metabolic panel; Future  -     X-Ray Chest PA And Lateral; Future    Mild intermittent chronic asthma without complication:  Continue to monitor    Controlled type 2 diabetes mellitus with " 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, with long-term current use of insulin:  Glucoses were elevated as an outpatient and patient has return to her stable dose of insulin    Essential hypertension:  Well controlled    Hyperlipidemia, unspecified hyperlipidemia type:  Well controlled    KHOA (obstructive sleep apnea):  Uses equipment        Problem List Items Addressed This Visit     Asthma, chronic    Overview     Stable on Advair 250/50 BID. Ventolin for rescue and Duo Nebs PRN         Essential hypertension    Hyperlipidemia    KHOA (obstructive sleep apnea)    Overview     Stable on CPAP         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, with long-term current use of insulin      Other Visit Diagnoses     Hospital discharge follow-up    -  Primary    Bacterial pneumonia        Relevant Orders    CBC auto differential    Comprehensive metabolic panel    X-Ray Chest PA And Lateral        Orders Placed This Encounter   Procedures    X-Ray Chest PA And Lateral     Standing Status:   Future     Standing Expiration Date:   2/22/2020    CBC auto differential     Standing Status:   Future     Standing Expiration Date:   2/22/2020    Comprehensive metabolic panel     Standing Status:   Future     Standing Expiration Date:   2/22/2020     Follow up in about 2 weeks (around 1/7/2020) for FU one day prior: Cbc, cmp and CXR.     Medication List           Accurate as of December 24, 2019 10:06 AM. If you have any questions, ask your nurse or doctor.               CHANGE how you take these medications    blood sugar diagnostic Strp  Commonly known as:  Accu-Chek Angelica  Uses Accu-Check Angelica meter to test BG 4x/day  What changed:  additional instructions     insulin glargine 100 units/mL (3mL) SubQ pen  Commonly known as:  Lantus Solostar U-100 Insulin  Inject 15 Units into the skin every evening.  What changed:  how  much to take     lancets Misc  Commonly known as:  Accu-Chek Softclix Lancets  Uses Accu-Chek Angelica meter to test BG 4x/day  What changed:  additional instructions        CONTINUE taking these medications    acetaminophen 500 MG tablet  Commonly known as:  TYLENOL     B-12 DOTS ORAL     benzonatate 100 MG capsule  Commonly known as:  Tessalon Perles  Take 2 capsules (200 mg total) by mouth 3 (three) times daily as needed for Cough.     carvedilol 25 MG tablet  Commonly known as:  COREG  Take 1 tablet (25 mg total) by mouth 2 (two) times daily with meals.     chlorthalidone 25 MG Tab  Commonly known as:  HYGROTEN  Take 1 tablet (25 mg total) by mouth once daily.     cloNIDine 0.1 mg/24 hr td ptwk 0.1 mg/24 hr  Commonly known as:  CATAPRES  Place 1 patch onto the skin every 7 days. (Saturdays)     diltiaZEM 120 MG Cp24  Commonly known as:  CARDIZEM CD  Take 1 capsule (120 mg total) by mouth once daily.     Enteric Coated Aspirin 81 MG EC tablet  Generic drug:  aspirin     Fish Oil 500 mg Cap  Generic drug:  omega-3 fatty acids     fluticasone propionate 50 mcg/actuation nasal spray  Commonly known as:  FLONASE  2 sprays (100 mcg total) by Each Nare route once daily.     hydrALAZINE 100 MG tablet  Commonly known as:  APRESOLINE  Take 1 tablet (100 mg total) by mouth 3 (three) times daily.     irbesartan 300 MG tablet  Commonly known as:  AVAPRO  Take 1 tablet (300 mg total) by mouth every evening.     levocetirizine 5 MG tablet  Commonly known as:  XYZAL  Take 1 tablet (5 mg total) by mouth every evening.     levoFLOXacin 750 MG tablet  Commonly known as:  LEVAQUIN  Take 1 tablet (750 mg total) by mouth once daily. for 9 days     lidocaine 5 %  Commonly known as:  LIDODERM  Place 1 patch onto the skin once daily. Place patch to right back. Leave on for 12 hours and remove for 12 hours.     linaGLIPtin 5 mg Tab tablet  Commonly known as:  Tradjenta  Take 1 tablet (5 mg total) by mouth once daily.     magnesium oxide 400  "mg (241.3 mg magnesium) tablet  Commonly known as:  MAG-OX  Take 1 tablet (400 mg total) by mouth once daily.     metFORMIN 500 MG tablet  Commonly known as:  GLUCOPHAGE  Take 1 tablet (500 mg total) by mouth 2 (two) times daily with meals.     nitroGLYCERIN 0.4 MG SL tablet  Commonly known as:  NITROSTAT     ondansetron 4 MG Tbdl  Commonly known as:  ZOFRAN-ODT  Take 1 tablet (4 mg total) by mouth every 6 (six) hours as needed.     pen needle, diabetic 31 gauge x 3/16" Ndle  Commonly known as:  BD Ultra-Fine Mini Pen Needle  USE WITH LANTUS AT BEDTIME     pravastatin 40 MG tablet  Commonly known as:  PRAVACHOL  Take 1 tablet (40 mg total) by mouth once daily.     traMADol 50 mg tablet  Commonly known as:  ULTRAM  Take 1 tablet (50 mg total) by mouth nightly as needed for Pain. New right thoracic apin     Ventolin HFA 90 mcg/actuation inhaler  Generic drug:  albuterol  INHALE 2 PUFFS INTO THE LUNGS EVERY 6 (SIX) HOURS AS NEEDED FOR WHEEZING. RESCUE     Vitamin D3 2,000 unit Tab  Generic drug:  cholecalciferol (vitamin D3)     Wixela Inhub 250-50 mcg/dose diskus inhaler  Generic drug:  fluticasone-salmeterol 250-50 mcg/dose  Inhale 1 puff into the lungs 2 (two) times daily. This is a change from one month            "

## 2020-01-02 ENCOUNTER — OFFICE VISIT (OUTPATIENT)
Dept: OPHTHALMOLOGY | Facility: CLINIC | Age: 80
End: 2020-01-02
Payer: MEDICARE

## 2020-01-02 VITALS — HEART RATE: 80 BPM | SYSTOLIC BLOOD PRESSURE: 162 MMHG | DIASTOLIC BLOOD PRESSURE: 72 MMHG

## 2020-01-02 DIAGNOSIS — H35.033 HYPERTENSIVE RETINOPATHY, BILATERAL: ICD-10-CM

## 2020-01-02 DIAGNOSIS — Z79.4 CONTROLLED TYPE 2 DIABETES MELLITUS WITH BOTH EYES AFFECTED BY MILD NONPROLIFERATIVE RETINOPATHY AND MACULAR EDEMA, WITH LONG-TERM CURRENT USE OF INSULIN: Primary | ICD-10-CM

## 2020-01-02 DIAGNOSIS — E11.3213 CONTROLLED TYPE 2 DIABETES MELLITUS WITH BOTH EYES AFFECTED BY MILD NONPROLIFERATIVE RETINOPATHY AND MACULAR EDEMA, WITH LONG-TERM CURRENT USE OF INSULIN: Primary | ICD-10-CM

## 2020-01-02 PROCEDURE — 92014 PR EYE EXAM, EST PATIENT,COMPREHESV: ICD-10-PCS | Mod: HCNC,S$GLB,, | Performed by: OPHTHALMOLOGY

## 2020-01-02 PROCEDURE — 99999 PR PBB SHADOW E&M-EST. PATIENT-LVL III: CPT | Mod: PBBFAC,HCNC,, | Performed by: OPHTHALMOLOGY

## 2020-01-02 PROCEDURE — 92134 POSTERIOR SEGMENT OCT RETINA (OCULAR COHERENCE TOMOGRAPHY)-BOTH EYES: ICD-10-PCS | Mod: HCNC,S$GLB,, | Performed by: OPHTHALMOLOGY

## 2020-01-02 PROCEDURE — 92134 CPTRZ OPH DX IMG PST SGM RTA: CPT | Mod: HCNC,S$GLB,, | Performed by: OPHTHALMOLOGY

## 2020-01-02 PROCEDURE — 92202 OPSCPY EXTND ON/MAC DRAW: CPT | Mod: HCNC,S$GLB,, | Performed by: OPHTHALMOLOGY

## 2020-01-02 PROCEDURE — 99999 PR PBB SHADOW E&M-EST. PATIENT-LVL III: ICD-10-PCS | Mod: PBBFAC,HCNC,, | Performed by: OPHTHALMOLOGY

## 2020-01-02 PROCEDURE — 92014 COMPRE OPH EXAM EST PT 1/>: CPT | Mod: HCNC,S$GLB,, | Performed by: OPHTHALMOLOGY

## 2020-01-02 PROCEDURE — 92202 PR OPHTHALMOSCOPY, EXT, W/DRAW OPTIC NERVE/MACULA, I&R, UNI/BI: ICD-10-PCS | Mod: HCNC,S$GLB,, | Performed by: OPHTHALMOLOGY

## 2020-01-02 NOTE — PROGRESS NOTES
"HPI     8 wk OCT  DLS: 11/07/2019 Dr. Arechiga     Patient states vision seems stable no change. Denies pain.    No gtts         OCT - OD No ME  OS - central cystoid edema    Prior FA - Foveal MA's OS   No NV of NP OU      A/P    1. 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 DME OS  S/p Avastin OS x 5  S/p Ozurdex OS x 5 9/19  S/p Iluvien 9/19  Foveal MA"s OS - may need chronic pharmacotherapy    Doing well, will need chronic steroid    Stable today - observe      3. HTN Ret OU  BS/BP/chol control    4. PCIOL OU  With small PCO      12 weeks OCT    "

## 2020-01-06 ENCOUNTER — HOSPITAL ENCOUNTER (OUTPATIENT)
Dept: RADIOLOGY | Facility: HOSPITAL | Age: 80
Discharge: HOME | End: 2020-01-06
Attending: INTERNAL MEDICINE
Payer: MEDICARE

## 2020-01-06 DIAGNOSIS — J15.9 BACTERIAL PNEUMONIA: ICD-10-CM

## 2020-01-06 PROCEDURE — 71046 X-RAY EXAM CHEST 2 VIEWS: CPT | Mod: TC,HCNC

## 2020-01-06 PROCEDURE — 71046 XR CHEST PA AND LATERAL: ICD-10-PCS | Mod: 26,HCNC,, | Performed by: RADIOLOGY

## 2020-01-06 PROCEDURE — 71046 X-RAY EXAM CHEST 2 VIEWS: CPT | Mod: 26,HCNC,, | Performed by: RADIOLOGY

## 2020-01-07 ENCOUNTER — OFFICE VISIT (OUTPATIENT)
Dept: INTERNAL MEDICINE | Facility: CLINIC | Age: 80
End: 2020-01-07
Payer: MEDICARE

## 2020-01-07 VITALS
HEIGHT: 63 IN | DIASTOLIC BLOOD PRESSURE: 58 MMHG | SYSTOLIC BLOOD PRESSURE: 138 MMHG | HEART RATE: 82 BPM | OXYGEN SATURATION: 95 % | BODY MASS INDEX: 25.82 KG/M2 | WEIGHT: 145.75 LBS

## 2020-01-07 DIAGNOSIS — J44.9 CHRONIC OBSTRUCTIVE PULMONARY DISEASE, UNSPECIFIED COPD TYPE: ICD-10-CM

## 2020-01-07 DIAGNOSIS — E11.3213 CONTROLLED TYPE 2 DIABETES MELLITUS WITH BOTH EYES AFFECTED BY MILD NONPROLIFERATIVE RETINOPATHY AND MACULAR EDEMA, WITH LONG-TERM CURRENT USE OF INSULIN: ICD-10-CM

## 2020-01-07 DIAGNOSIS — J90 PLEURAL EFFUSION: Primary | ICD-10-CM

## 2020-01-07 DIAGNOSIS — I42.8 NONISCHEMIC CARDIOMYOPATHY: ICD-10-CM

## 2020-01-07 DIAGNOSIS — E27.8 ADRENAL CORTICAL NODULE: ICD-10-CM

## 2020-01-07 DIAGNOSIS — N18.30 CHRONIC KIDNEY DISEASE, STAGE 3, MOD DECREASED GFR: ICD-10-CM

## 2020-01-07 DIAGNOSIS — Z79.4 CONTROLLED TYPE 2 DIABETES MELLITUS WITH BOTH EYES AFFECTED BY MILD NONPROLIFERATIVE RETINOPATHY AND MACULAR EDEMA, WITH LONG-TERM CURRENT USE OF INSULIN: ICD-10-CM

## 2020-01-07 DIAGNOSIS — I25.10 CORONARY ARTERY DISEASE INVOLVING NATIVE CORONARY ARTERY OF NATIVE HEART WITHOUT ANGINA PECTORIS: ICD-10-CM

## 2020-01-07 DIAGNOSIS — J15.1: ICD-10-CM

## 2020-01-07 DIAGNOSIS — M54.6 ACUTE RIGHT-SIDED THORACIC BACK PAIN: ICD-10-CM

## 2020-01-07 DIAGNOSIS — I25.5 CARDIOMYOPATHY, ISCHEMIC: ICD-10-CM

## 2020-01-07 DIAGNOSIS — D32.9 MENINGIOMA: ICD-10-CM

## 2020-01-07 DIAGNOSIS — N17.9 AKI (ACUTE KIDNEY INJURY): ICD-10-CM

## 2020-01-07 DIAGNOSIS — I70.0 CALCIFICATION OF AORTA: ICD-10-CM

## 2020-01-07 DIAGNOSIS — T45.1X5A CHEMOTHERAPY-INDUCED NEUROPATHY: ICD-10-CM

## 2020-01-07 DIAGNOSIS — G62.0 CHEMOTHERAPY-INDUCED NEUROPATHY: ICD-10-CM

## 2020-01-07 PROCEDURE — 99499 RISK ADDL DX/OHS AUDIT: ICD-10-PCS | Mod: HCNC,S$GLB,, | Performed by: INTERNAL MEDICINE

## 2020-01-07 PROCEDURE — 3078F PR MOST RECENT DIASTOLIC BLOOD PRESSURE < 80 MM HG: ICD-10-PCS | Mod: HCNC,CPTII,S$GLB, | Performed by: INTERNAL MEDICINE

## 2020-01-07 PROCEDURE — 1159F MED LIST DOCD IN RCRD: CPT | Mod: HCNC,S$GLB,, | Performed by: INTERNAL MEDICINE

## 2020-01-07 PROCEDURE — 99214 PR OFFICE/OUTPT VISIT, EST, LEVL IV, 30-39 MIN: ICD-10-PCS | Mod: HCNC,S$GLB,, | Performed by: INTERNAL MEDICINE

## 2020-01-07 PROCEDURE — 99999 PR PBB SHADOW E&M-EST. PATIENT-LVL III: CPT | Mod: PBBFAC,HCNC,, | Performed by: INTERNAL MEDICINE

## 2020-01-07 PROCEDURE — 1125F AMNT PAIN NOTED PAIN PRSNT: CPT | Mod: HCNC,S$GLB,, | Performed by: INTERNAL MEDICINE

## 2020-01-07 PROCEDURE — 1101F PT FALLS ASSESS-DOCD LE1/YR: CPT | Mod: HCNC,CPTII,S$GLB, | Performed by: INTERNAL MEDICINE

## 2020-01-07 PROCEDURE — 99999 PR PBB SHADOW E&M-EST. PATIENT-LVL III: ICD-10-PCS | Mod: PBBFAC,HCNC,, | Performed by: INTERNAL MEDICINE

## 2020-01-07 PROCEDURE — 1125F PR PAIN SEVERITY QUANTIFIED, PAIN PRESENT: ICD-10-PCS | Mod: HCNC,S$GLB,, | Performed by: INTERNAL MEDICINE

## 2020-01-07 PROCEDURE — 1159F PR MEDICATION LIST DOCUMENTED IN MEDICAL RECORD: ICD-10-PCS | Mod: HCNC,S$GLB,, | Performed by: INTERNAL MEDICINE

## 2020-01-07 PROCEDURE — 3075F PR MOST RECENT SYSTOLIC BLOOD PRESS GE 130-139MM HG: ICD-10-PCS | Mod: HCNC,CPTII,S$GLB, | Performed by: INTERNAL MEDICINE

## 2020-01-07 PROCEDURE — 99214 OFFICE O/P EST MOD 30 MIN: CPT | Mod: HCNC,S$GLB,, | Performed by: INTERNAL MEDICINE

## 2020-01-07 PROCEDURE — 99499 UNLISTED E&M SERVICE: CPT | Mod: HCNC,S$GLB,, | Performed by: INTERNAL MEDICINE

## 2020-01-07 PROCEDURE — 3075F SYST BP GE 130 - 139MM HG: CPT | Mod: HCNC,CPTII,S$GLB, | Performed by: INTERNAL MEDICINE

## 2020-01-07 PROCEDURE — 1101F PR PT FALLS ASSESS DOC 0-1 FALLS W/OUT INJ PAST YR: ICD-10-PCS | Mod: HCNC,CPTII,S$GLB, | Performed by: INTERNAL MEDICINE

## 2020-01-07 PROCEDURE — 3078F DIAST BP <80 MM HG: CPT | Mod: HCNC,CPTII,S$GLB, | Performed by: INTERNAL MEDICINE

## 2020-01-07 NOTE — PROGRESS NOTES
Subjective:      Patient ID: Myesha Quinones is a 79 y.o. female.    Chief Complaint: Follow-up    HPI:  HPI   Patient is seen for the second appt since her second admission ( HAP after CAP) for  pneumonia. She reports feeling better/stronger. Antibiotics are completed Her labs are much improved and I had suggested taking one Ensure protein a day. Her CXR is stable but has a sizable pleural effusion.  Patient Active Problem List   Diagnosis    History of nonmelanoma skin cancer    Nonischemic cardiomyopathy    LBBB (left bundle branch block)    Nontoxic multinodular goiter    Meningioma    Asthma, chronic    Biventricular ICD (implantable cardioverter-defibrillator) in place    Tremor    Coronary artery disease involving native coronary artery without angina pectoris - Non-obstructive 50% LAD    Essential hypertension    Hyperlipidemia    History of breast cancer    Adrenal cortical nodule: repeat CT 6/2016    Calcification of aorta    Diabetic polyneuropathy associated with type 2 diabetes mellitus    Osteoporosis    KHOA (obstructive sleep apnea)    Type 2 diabetes mellitus with stage 3 chronic kidney disease, with long-term current use of insulin    Chronic kidney disease, stage 3, mod decreased GFR    Iron deficiency anemia    Chemotherapy-induced neuropathy    Hypomagnesemia    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, with long-term current use of insulin    Hypertensive retinopathy, bilateral    Multiple lung nodules    COPD (chronic obstructive pulmonary disease)    Mixed conductive and sensorineural hearing loss    ICD (implantable cardioverter-defibrillator) battery depletion    Type 2 DM with CKD stage 3 and hypertension    Cardiomyopathy, ischemic    Metabolic bone disease    Proteinuria    Seasonal allergies    Pseudomonas pneumonia    VIRGILIO (acute kidney injury)    CAP (community acquired  pneumonia) due to Pseudomonas species    Rib fracture    Parapneumonic effusion     Past Medical History:   Diagnosis Date    Acute hypoxemic respiratory failure 12/19/2019    Acute right-sided thoracic back pain 12/9/2019    Allergy     Asthma     Basal cell carcinoma     left forehead    Basal cell carcinoma     left nose    Basal cell carcinoma 05/20/2015    right nose    Basal cell carcinoma 12/22/2015    left lower post neck    Breast cancer     CAD (coronary artery disease)     Cardiomyopathy     Cardiomyopathy, ischemic     Cataract     CHF (congestive heart failure)     Chronic kidney disease, stage 3, mod decreased GFR 2/14/2017    Colon polyp 2011    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, with long-term current use of insulin 2/22/2018    COPD (chronic obstructive pulmonary disease)     COPD exacerbation 4/8/2018    Defibrillator discharge     Diabetes mellitus     Diabetes mellitus type II     Diabetes with neurologic complications     Goiter     MNG    HX: breast cancer     Hyperlipidemia     Hypertension     Iron deficiency anemia 5/16/2017    Left kidney mass     Meningioma     Osteoporosis, postmenopausal     Pacemaker     Pneumonia 12/8/2019    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    Pseudomonas pneumonia     Skin cancer     s/p excision    Sleep apnea     CPAP    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/2015    mid forehead    Unspecified vitamin D deficiency     Ventricular tachycardia     Vitamin B12 deficiency     Vitamin D deficiency disease      Past Surgical History:   Procedure Laterality Date    BASAL CELL CARCINOMA EXCISION      posterior neck and nose    BREAST BIOPSY      BREAST CYST EXCISION Left     BREAST SURGERY      CARDIAC DEFIBRILLATOR PLACEMENT      x 2    CATARACT EXTRACTION W/  INTRAOCULAR LENS IMPLANT Bilateral     CHOLECYSTECTOMY      COLONOSCOPY N/A  11/5/2019    Procedure: COLONOSCOPY;  Surgeon: Boaz Botello MD;  [facility]  Service: Endoscopy;  Laterality: N/A;  AICD - Medtronic - sm    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    FRACTURE SURGERY      left elbow and wrist as a child    HYSTERECTOMY      MASTECTOMY Right     REPLACEMENT OF IMPLANTABLE CARDIOVERTER-DEFIBRILLATOR (ICD) GENERATOR N/A 12/17/2018    Procedure: REPLACEMENT, ICD GENERATOR;  Surgeon: Jan Mckeon MD;  Location: [facility] EP LAB;  Service: Cardiology;  Laterality: N/A;  DONNA, CRT-D gen sulema, MDT, MAC, SK, 3 Prep    REVISION OF SKIN POCKET FOR CARDIOVERTER-DEFIBRILLATOR  12/17/2018    Procedure: Revision, Skin Pocket, For Cardioverter-Defibrillator;  Surgeon: Jan Mckeon MD;  Location: [facility] EP LAB;  Service: Cardiology;;    SQUAMOUS CELL CARCINOMA EXCISION      remved from forehead    TONSILLECTOMY       Family History   Problem Relation Age of Onset    Diabetes Father     Heart disease Father     Diabetes Sister     Heart disease Sister     Diabetes Brother     Heart disease Brother     Hypertension Brother     Diabetes Brother     Heart disease Brother     Hypertension Brother     Diabetes Brother     Heart disease Brother     Cancer Brother         colon    Diabetes Brother     Cancer Son         skin    Diabetes Son         prediabetes    Diabetes Daughter         prediabetes    Cancer Daughter         melanoma    Obesity Daughter     Melanoma Daughter     Diabetes Son     Asthma Mother     Hypertension Mother     Stroke Mother     No Known Problems Maternal Grandmother     No Known Problems Maternal Grandfather     No Known Problems Paternal Grandmother     No Known Problems Paternal Grandfather     Amblyopia Neg Hx     Blindness Neg Hx     Cataracts Neg Hx     Glaucoma Neg Hx     Macular degeneration Neg Hx     Retinal detachment Neg Hx     Strabismus Neg Hx     Thyroid disease Neg Hx      Review of Systems  "  Constitutional: Negative for activity change, chills, fever and unexpected weight change.   Respiratory: Positive for shortness of breath. Negative for cough, chest tightness and wheezing.    Cardiovascular: Negative for chest pain, palpitations and leg swelling.      Overall improved, some shortness of breath no chest pain and must less wheeziing  Objective:     Vitals:    01/07/20 1155   BP: (!) 138/58   Pulse: 82   SpO2: 95%   Weight: 66.1 kg (145 lb 11.6 oz)   Height: 5' 3" (1.6 m)   PainSc:   2     Body mass index is 25.81 kg/m².  Physical Exam   Constitutional: She is oriented to person, place, and time. She appears well-developed and well-nourished. No distress.   Neck: No JVD present. Carotid bruit is not present. No thyromegaly present.   Cardiovascular: Normal rate, regular rhythm, normal heart sounds and intact distal pulses. PMI is not displaced.   Pulmonary/Chest: Effort normal and breath sounds normal. No respiratory distress.   Abdominal: Soft. Bowel sounds are normal. She exhibits no distension. There is no tenderness.   Musculoskeletal: She exhibits no edema.   Neurological: She is alert and oriented to person, place, and time.     Assessment:     1. Pleural effusion    2. Meningioma    3. Adrenal cortical nodule    4. Chemotherapy-induced neuropathy    5. Nonischemic cardiomyopathy    6. Calcification of aorta    7. Chronic obstructive pulmonary disease, unspecified COPD type    8. Acute right-sided thoracic back pain    9. Adrenal cortical nodule: repeat CT 6/2016    10. VIRGILIO (acute kidney injury)    11. CAP (community acquired pneumonia) due to Pseudomonas species    12. Cardiomyopathy, ischemic    13. Chronic kidney disease, stage 3, mod decreased GFR    14. 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, with long-term current use of insulin    15. Coronary artery disease involving native coronary artery of native heart " without angina pectoris      Plan:   Myesha was seen today for follow-up.    Diagnoses and all orders for this visit:    Pleural effusion  Comments:  Will ask Pulmonary for appt to evaluate need for removal of pleural effusion  Orders:  -     Ambulatory consult to Pulmonology    Meningioma  Comments:  Monitored    Adrenal cortical nodule  Comments:  Monoitored in Endo    Chemotherapy-induced neuropathy  Comments:  stable and followed    Nonischemic cardiomyopathy  Comments:  stable and followed in Cardiology    Calcification of aorta  Comments:  On statin    Chronic obstructive pulmonary disease, unspecified COPD type  Comments:  Followed in pulmonary    Acute right-sided thoracic back pain    Adrenal cortical nodule: repeat CT 6/2016    VIRGILIO (acute kidney injury)  Comments:  Resolved GFR improved    CAP (community acquired pneumonia) due to Pseudomonas species  Comments:  Improving, HAP also improving with pleural effusion    Cardiomyopathy, ischemic    Chronic kidney disease, stage 3, mod decreased GFR    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, with long-term current use of insulin    Coronary artery disease involving native coronary artery of native heart without angina pectoris        Problem List Items Addressed This Visit     Nonischemic cardiomyopathy    Overview     EF normalized after CRT         Meningioma    Coronary artery disease involving native coronary artery without angina pectoris - Non-obstructive 50% LAD    Adrenal cortical nodule: repeat CT 6/2016    Calcification of aorta    Chronic kidney disease, stage 3, mod decreased GFR    Overview     Reduced metformin to 50% of previous as patient has GFR 32 ( 30-45) and is currently on insulin         Chemotherapy-induced neuropathy    Overview     See hem/onc notes dated 03/2017         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  retinopathy and macular edema, with long-term current use of insulin    COPD (chronic obstructive pulmonary disease)    Cardiomyopathy, ischemic    VIRGILIO (acute kidney injury)    CAP (community acquired pneumonia) due to Pseudomonas species    RESOLVED: Acute right-sided thoracic back pain      Other Visit Diagnoses     Pleural effusion    -  Primary    Will ask Pulmonary for appt to evaluate need for removal of pleural effusion    Relevant Orders    Ambulatory consult to Pulmonology        Orders Placed This Encounter   Procedures    Ambulatory consult to Pulmonology     Referral Priority:   Routine     Referral Type:   Consultation     Referral Reason:   Specialty Services Required     Requested Specialty:   Pulmonary Disease     Number of Visits Requested:   1     Follow up in about 2 weeks (around 1/21/2020) for Follow up.     Medication List           Accurate as of January 7, 2020  5:53 PM. If you have any questions, ask your nurse or doctor.               CHANGE how you take these medications    blood sugar diagnostic Strp  Commonly known as:  Accu-Chek Angelica  Uses Accu-Check Angelica meter to test BG 4x/day  What changed:  additional instructions     insulin glargine 100 units/mL (3mL) SubQ pen  Commonly known as:  Lantus Solostar U-100 Insulin  Inject 15 Units into the skin every evening.  What changed:  how much to take     lancets Misc  Commonly known as:  Accu-Chek Softclix Lancets  Uses Accu-Chek Angelica meter to test BG 4x/day  What changed:  additional instructions        CONTINUE taking these medications    acetaminophen 500 MG tablet  Commonly known as:  TYLENOL     B-12 DOTS ORAL     benzonatate 100 MG capsule  Commonly known as:  Tessalon Perles  Take 2 capsules (200 mg total) by mouth 3 (three) times daily as needed for Cough.     carvedilol 25 MG tablet  Commonly known as:  COREG  Take 1 tablet (25 mg total) by mouth 2 (two) times daily with meals.     chlorthalidone 25 MG Tab  Commonly known as:   "HYGROTEN  Take 1 tablet (25 mg total) by mouth once daily.     cloNIDine 0.1 mg/24 hr td ptwk 0.1 mg/24 hr  Commonly known as:  CATAPRES  Place 1 patch onto the skin every 7 days. (Saturdays)     diltiaZEM 120 MG Cp24  Commonly known as:  CARDIZEM CD  Take 1 capsule (120 mg total) by mouth once daily.     Enteric Coated Aspirin 81 MG EC tablet  Generic drug:  aspirin     Fish Oil 500 mg Cap  Generic drug:  omega-3 fatty acids     fluticasone propionate 50 mcg/actuation nasal spray  Commonly known as:  FLONASE  2 sprays (100 mcg total) by Each Nare route once daily.     hydrALAZINE 100 MG tablet  Commonly known as:  APRESOLINE  Take 1 tablet (100 mg total) by mouth 3 (three) times daily.     irbesartan 300 MG tablet  Commonly known as:  AVAPRO  Take 1 tablet (300 mg total) by mouth every evening.     levocetirizine 5 MG tablet  Commonly known as:  XYZAL  Take 1 tablet (5 mg total) by mouth every evening.     lidocaine 5 %  Commonly known as:  LIDODERM  Place 1 patch onto the skin once daily. Place patch to right back. Leave on for 12 hours and remove for 12 hours.     linaGLIPtin 5 mg Tab tablet  Commonly known as:  Tradjenta  Take 1 tablet (5 mg total) by mouth once daily.     magnesium oxide 400 mg (241.3 mg magnesium) tablet  Commonly known as:  MAG-OX  Take 1 tablet (400 mg total) by mouth once daily.     metFORMIN 500 MG tablet  Commonly known as:  GLUCOPHAGE  Take 1 tablet (500 mg total) by mouth 2 (two) times daily with meals.     nitroGLYCERIN 0.4 MG SL tablet  Commonly known as:  NITROSTAT     ondansetron 4 MG Tbdl  Commonly known as:  ZOFRAN-ODT  Take 1 tablet (4 mg total) by mouth every 6 (six) hours as needed.     pen needle, diabetic 31 gauge x 3/16" Ndle  Commonly known as:  BD Ultra-Fine Mini Pen Needle  USE WITH LANTUS AT BEDTIME     pravastatin 40 MG tablet  Commonly known as:  PRAVACHOL  Take 1 tablet (40 mg total) by mouth once daily.     traMADol 50 mg tablet  Commonly known as:  ULTRAM  Take 1 " tablet (50 mg total) by mouth nightly as needed for Pain. New right thoracic apin     Ventolin HFA 90 mcg/actuation inhaler  Generic drug:  albuterol  INHALE 2 PUFFS INTO THE LUNGS EVERY 6 (SIX) HOURS AS NEEDED FOR WHEEZING. RESCUE     Vitamin D3 2,000 unit Tab  Generic drug:  cholecalciferol (vitamin D3)     Wixela Inhub 250-50 mcg/dose diskus inhaler  Generic drug:  fluticasone-salmeterol 250-50 mcg/dose  Inhale 1 puff into the lungs 2 (two) times daily. This is a change from one month

## 2020-01-09 ENCOUNTER — PROCEDURE VISIT (OUTPATIENT)
Dept: DERMATOLOGY | Facility: CLINIC | Age: 80
End: 2020-01-09
Payer: MEDICARE

## 2020-01-09 ENCOUNTER — TELEPHONE (OUTPATIENT)
Dept: INTERNAL MEDICINE | Facility: CLINIC | Age: 80
End: 2020-01-09

## 2020-01-09 VITALS
HEIGHT: 63 IN | HEART RATE: 95 BPM | BODY MASS INDEX: 25.69 KG/M2 | SYSTOLIC BLOOD PRESSURE: 179 MMHG | DIASTOLIC BLOOD PRESSURE: 85 MMHG | WEIGHT: 145 LBS

## 2020-01-09 DIAGNOSIS — C44.319 BASAL CELL CARCINOMA, FOREHEAD: Primary | ICD-10-CM

## 2020-01-09 DIAGNOSIS — J90 PLEURAL EFFUSION: Primary | ICD-10-CM

## 2020-01-09 PROCEDURE — 17312 MOHS ADDL STAGE: CPT | Mod: HCNC,S$GLB,, | Performed by: DERMATOLOGY

## 2020-01-09 PROCEDURE — 99499 NO LOS: ICD-10-PCS | Mod: HCNC,S$GLB,, | Performed by: DERMATOLOGY

## 2020-01-09 PROCEDURE — 17311 MOHS 1 STAGE H/N/HF/G: CPT | Mod: HCNC,S$GLB,, | Performed by: DERMATOLOGY

## 2020-01-09 PROCEDURE — 17312: ICD-10-PCS | Mod: HCNC,S$GLB,, | Performed by: DERMATOLOGY

## 2020-01-09 PROCEDURE — 17311: ICD-10-PCS | Mod: HCNC,S$GLB,, | Performed by: DERMATOLOGY

## 2020-01-09 PROCEDURE — 99499 UNLISTED E&M SERVICE: CPT | Mod: HCNC,S$GLB,, | Performed by: DERMATOLOGY

## 2020-01-09 PROCEDURE — 13121 CMPLX RPR S/A/L 2.6-7.5 CM: CPT | Mod: HCNC,59,S$GLB, | Performed by: DERMATOLOGY

## 2020-01-09 PROCEDURE — 13121 PR RECMPL WND SCALP,EXTR 2.6-7.5 CM: ICD-10-PCS | Mod: HCNC,59,S$GLB, | Performed by: DERMATOLOGY

## 2020-01-09 RX ORDER — CEPHALEXIN 500 MG/1
500 CAPSULE ORAL 3 TIMES DAILY
Qty: 30 CAPSULE | Refills: 0 | Status: SHIPPED | OUTPATIENT
Start: 2020-01-09 | End: 2020-01-19

## 2020-01-09 NOTE — PROGRESS NOTES
PROCEDURE: Mohs' Micrographic Surgery    INDICATION: Location in non-mask areas of face where maximum conservation of tumor-free tissue is needed. Biopsy-proven skin cancer of cosmetically and functionally important areas, including head, neck, genital, hand, foot, or areas known for having difficulty in healing, such as the lower anterior legs. Tumor with ill-defined borders.    REFERRING MD: Erika Nguyen M.D.    CASE NUMBER:     ANESTHETIC: 6 cc 0.5% Lidocaine with Epi 1:200,000 mixed 1:1 with 0.5% Bupivacaine    SURGICAL PREP: Hibiclens    SURGEON: Diaz Roche MD    ASSISTANTS: Inez Gray PA-C, Shawna Wen, Surg Tech and Maggy Cortes, Surg Tech    PREOPERATIVE DIAGNOSIS: basal cell carcinoma    POSTOPERATIVE DIAGNOSIS: basal cell carcinoma    PATHOLOGIC DIAGNOSIS: basal cell carcinoma- superficial, nodular    HISTOLOGY OF SPECIMENS IN FIRST STAGE:   Tumor Type: Tumor seen. Superficial basal cell carcinoma: Foci of basaloid cells with peripheral palisading and focal retraction artifact arising along the dermoepidermal junction and extending into the papillary dermis.  Nodular basal cell carcinoma: Nodular tumor in dermis composed of basaloid cells exhibiting peripheral palisading and retraction artifact.   Depth of Invasion: epidermis and dermis  Perineural Invasion: No    HISTOLOGY OF SPECIMENS IN SUBSEQUENT STAGES:  Tumor Type: Tumor seen. Same as in first stage.   Depth of Invasion: epidermis and dermis  Perineural Invasion: No    STAGES OF MOHS' SURGERY PERFORMED: 3    TUMOR-FREE PLANE ACHIEVED: Yes    HEMOSTASIS: bipolar forceps     SPECIMENS: 6 (2 in stage A, 2 in stage B and 2 in stage C)    LOCATION: left anterior scalp (superolateral forehead at hairline). Patient verified location with hand held mirror.    INITIAL LESION SIZE: 0.4 x 0.4 cm    FINAL DEFECT SIZE: 1.4 x 1.8 cm    WOUND REPAIR/DISPOSITION: The patient tolerated Mohs' Micrographic Surgery for a basal cell carcinoma very well.  "When the tumor was completely removed, a repair of the surgical defect was undertaken.      PROCEDURE: Complex Linear Repair    INDICATION: Status post Mohs' Micrographic Surgery for basal cell carcinoma.    CASE NUMBER:     SURGEON: Diaz Roche MD    ASSISTANTS: Inez Gray PA-C and Faby Zee    ANESTHETIC: 3 cc 1% Lidocaine with Epinephrine 1:100,000    SURGICAL PREP: Hibiclens, prepped by Faby Zee    LOCATION: left anterior scalp (superolateral forehead at hairline)    DEFECT SIZE: 1.4 x 1.8 cm    WOUND REPAIR/DISPOSITION:  After the patient's carcinoma had been completely removed with Mohs' Micrographic Surgery, a repair of the surgical defect was undertaken. The patient was returned to the operating suite where the area of left anterior scalp was prepped, draped, and anesthetized in the usual sterile fashion. The wound was widely undermined in all directions. Then, electrocoagulation after magnet placed over defibrillator without event and 4-0 vicryl suture ties were used to obtain meticulous hemostasis. 4-0 Vicryl buried vertical mattress sutures were placed into the subcutaneous and dermal plane to close the wound and giles the cutaneous wound edge. Bilateral dog ears were identified and were removed by a standard Burow's triangle technique. The cutaneous wound edges were closed using interrupted 4-0 Prolene and 5-0 Prolene sutures.    The patient tolerated the procedure well.    The area was cleaned and dressed appropriately and the patient was given wound care instructions, as well as appointment for follow-up evaluation. Patient already has pain medication at home and was placed on Keflex 500 mg TID x 10 days.    LENGTH OF REPAIR: 3.7 cm    Vitals:    01/09/20 0728 01/09/20 1118   BP: (!) 201/89 (!) 179/85   Pulse: 97 95   Weight: 65.8 kg (145 lb)    Height: 5' 3" (1.6 m)          "

## 2020-01-10 NOTE — TELEPHONE ENCOUNTER
----- Message from Jacquelyn Stark MD sent at 1/9/2020 12:17 PM CST -----  Contact: Emelina Tarango,  It looks like my partner, Dr. Loretta Gaspar, is following him.  I have included him on this message to allow you to directly communicate with him.  Jacquelyn    ----- Message -----  From: Emelina Gaston MD  Sent: 1/7/2020  12:24 PM CST  To: Jacquelyn Stark MD    Dear Jacquelyn,      I wonder if you could help with this patient or have someone help with this patient. She is doing quite well since her 2 admits for pulmonary disease but still has a significant pleural effusion. With her underlying lung disease I wondered if this should be removed.    Thank you,  Emelina    Patient's phone number [phone] . This is her house number, it does have an answering machine and the day and time of an appt may be left on the machine or given to me and I will make sure she is aware of it. She had a large volume removed in the hospital and thus is aware of the procedure.

## 2020-01-10 NOTE — TELEPHONE ENCOUNTER
This patient has a significant pleural effusion. Dr. Stark told me that you had see her. Is it possible to remove this fluid or evaluate her in the office. You may leave the appt on her home phone she said. GML

## 2020-01-13 ENCOUNTER — TELEPHONE (OUTPATIENT)
Dept: PULMONOLOGY | Facility: CLINIC | Age: 80
End: 2020-01-13

## 2020-01-13 NOTE — TELEPHONE ENCOUNTER
Patient stated that she was calling to check on an appointment with Dr. Gaspar for this upcoming Wednesday at 1:30pm. Patient was advised that per Dr. Gaspar she was placed on his scheduled, she has verbalize that she understand.

## 2020-01-13 NOTE — TELEPHONE ENCOUNTER
----- Message from Bassam Pierre sent at 1/13/2020 11:56 AM CST -----  Contact: Pt  Pt called and would like to speak to someone regarding her appt on 1/15/20    Pt can be reached at [phone]

## 2020-01-14 ENCOUNTER — EXTERNAL HOME HEALTH (OUTPATIENT)
Dept: HOME HEALTH SERVICES | Facility: HOSPITAL | Age: 80
End: 2020-01-14
Payer: MEDICARE

## 2020-01-15 ENCOUNTER — OFFICE VISIT (OUTPATIENT)
Dept: PULMONOLOGY | Facility: CLINIC | Age: 80
End: 2020-01-15
Payer: MEDICARE

## 2020-01-15 ENCOUNTER — HOSPITAL ENCOUNTER (OUTPATIENT)
Dept: RADIOLOGY | Facility: HOSPITAL | Age: 80
Discharge: HOME | End: 2020-01-15
Attending: INTERNAL MEDICINE
Payer: MEDICARE

## 2020-01-15 VITALS
WEIGHT: 151.44 LBS | OXYGEN SATURATION: 94 % | HEIGHT: 63 IN | HEART RATE: 93 BPM | DIASTOLIC BLOOD PRESSURE: 62 MMHG | SYSTOLIC BLOOD PRESSURE: 122 MMHG | BODY MASS INDEX: 26.83 KG/M2

## 2020-01-15 DIAGNOSIS — J90 PLEURAL EFFUSION: Primary | ICD-10-CM

## 2020-01-15 DIAGNOSIS — K76.0 FATTY (CHANGE OF) LIVER, NOT ELSEWHERE CLASSIFIED: ICD-10-CM

## 2020-01-15 DIAGNOSIS — J90 PLEURAL EFFUSION: ICD-10-CM

## 2020-01-15 LAB
ALBUMIN FLD-MCNC: 2.4 G/DL
AMYLASE, BODY FLUID: 28 U/L
APPEARANCE FLD: CLEAR
BODY FLD TYPE: NORMAL
BODY FLUID SOURCE AMYLASE: NORMAL
BODY FLUID SOURCE, LDH: NORMAL
COLOR FLD: YELLOW
EOSINOPHIL NFR FLD MANUAL: 2 %
GLUCOSE FLD-MCNC: 130 MG/DL
GRAM STN SPEC: NORMAL
GRAM STN SPEC: NORMAL
LDH FLD L TO P-CCNC: 133 U/L
LYMPHOCYTES NFR FLD MANUAL: 82 %
MONOS+MACROS NFR FLD MANUAL: 10 %
NEUTROPHILS NFR FLD MANUAL: 6 %
PROT FLD-MCNC: 4.5 G/DL
SPECIMEN SOURCE: NORMAL
WBC # FLD: 407 /CU MM

## 2020-01-15 PROCEDURE — 3074F SYST BP LT 130 MM HG: CPT | Mod: HCNC,CPTII,S$GLB, | Performed by: INTERNAL MEDICINE

## 2020-01-15 PROCEDURE — 32554 PR THORACEN W/O IMAG GUIDANC: ICD-10-PCS | Mod: HCNC,RT,S$GLB, | Performed by: INTERNAL MEDICINE

## 2020-01-15 PROCEDURE — 3078F PR MOST RECENT DIASTOLIC BLOOD PRESSURE < 80 MM HG: ICD-10-PCS | Mod: HCNC,CPTII,S$GLB, | Performed by: INTERNAL MEDICINE

## 2020-01-15 PROCEDURE — 84478 ASSAY OF TRIGLYCERIDES: CPT | Mod: HCNC

## 2020-01-15 PROCEDURE — 83615 LACTATE (LD) (LDH) ENZYME: CPT | Mod: 91,HCNC

## 2020-01-15 PROCEDURE — 88305 TISSUE EXAM BY PATHOLOGIST: CPT | Mod: HCNC | Performed by: PATHOLOGY

## 2020-01-15 PROCEDURE — 1159F PR MEDICATION LIST DOCUMENTED IN MEDICAL RECORD: ICD-10-PCS | Mod: HCNC,S$GLB,, | Performed by: INTERNAL MEDICINE

## 2020-01-15 PROCEDURE — 71046 XR CHEST PA AND LATERAL: ICD-10-PCS | Mod: 26,HCNC,, | Performed by: RADIOLOGY

## 2020-01-15 PROCEDURE — 88305 TISSUE EXAM BY PATHOLOGIST: CPT | Mod: 26,HCNC,, | Performed by: PATHOLOGY

## 2020-01-15 PROCEDURE — 84157 ASSAY OF PROTEIN OTHER: CPT | Mod: HCNC

## 2020-01-15 PROCEDURE — 82945 GLUCOSE OTHER FLUID: CPT | Mod: HCNC

## 2020-01-15 PROCEDURE — 88112 CYTOPATH CELL ENHANCE TECH: CPT | Mod: 26,HCNC,, | Performed by: PATHOLOGY

## 2020-01-15 PROCEDURE — 88112 CYTOPATH CELL ENHANCE TECH: CPT | Mod: HCNC | Performed by: PATHOLOGY

## 2020-01-15 PROCEDURE — 99999 PR PBB SHADOW E&M-EST. PATIENT-LVL III: CPT | Mod: PBBFAC,HCNC,, | Performed by: INTERNAL MEDICINE

## 2020-01-15 PROCEDURE — 87205 SMEAR GRAM STAIN: CPT | Mod: HCNC

## 2020-01-15 PROCEDURE — 71046 X-RAY EXAM CHEST 2 VIEWS: CPT | Mod: TC,HCNC,FY

## 2020-01-15 PROCEDURE — 1126F PR PAIN SEVERITY QUANTIFIED, NO PAIN PRESENT: ICD-10-PCS | Mod: HCNC,S$GLB,, | Performed by: INTERNAL MEDICINE

## 2020-01-15 PROCEDURE — 1101F PT FALLS ASSESS-DOCD LE1/YR: CPT | Mod: HCNC,CPTII,S$GLB, | Performed by: INTERNAL MEDICINE

## 2020-01-15 PROCEDURE — 3074F PR MOST RECENT SYSTOLIC BLOOD PRESSURE < 130 MM HG: ICD-10-PCS | Mod: HCNC,CPTII,S$GLB, | Performed by: INTERNAL MEDICINE

## 2020-01-15 PROCEDURE — 99999 PR PBB SHADOW E&M-EST. PATIENT-LVL III: ICD-10-PCS | Mod: PBBFAC,HCNC,, | Performed by: INTERNAL MEDICINE

## 2020-01-15 PROCEDURE — 99499 UNLISTED E&M SERVICE: CPT | Mod: HCNC,S$GLB,, | Performed by: INTERNAL MEDICINE

## 2020-01-15 PROCEDURE — 32554 ASPIRATE PLEURA W/O IMAGING: CPT | Mod: HCNC,RT,S$GLB, | Performed by: INTERNAL MEDICINE

## 2020-01-15 PROCEDURE — 1126F AMNT PAIN NOTED NONE PRSNT: CPT | Mod: HCNC,S$GLB,, | Performed by: INTERNAL MEDICINE

## 2020-01-15 PROCEDURE — 3078F DIAST BP <80 MM HG: CPT | Mod: HCNC,CPTII,S$GLB, | Performed by: INTERNAL MEDICINE

## 2020-01-15 PROCEDURE — 99499 NO LOS: ICD-10-PCS | Mod: HCNC,S$GLB,, | Performed by: INTERNAL MEDICINE

## 2020-01-15 PROCEDURE — 87070 CULTURE OTHR SPECIMN AEROBIC: CPT | Mod: HCNC

## 2020-01-15 PROCEDURE — 88305 TISSUE EXAM BY PATHOLOGIST: ICD-10-PCS | Mod: 26,HCNC,, | Performed by: PATHOLOGY

## 2020-01-15 PROCEDURE — 82042 OTHER SOURCE ALBUMIN QUAN EA: CPT | Mod: HCNC

## 2020-01-15 PROCEDURE — 89051 BODY FLUID CELL COUNT: CPT | Mod: HCNC

## 2020-01-15 PROCEDURE — 1101F PR PT FALLS ASSESS DOC 0-1 FALLS W/OUT INJ PAST YR: ICD-10-PCS | Mod: HCNC,CPTII,S$GLB, | Performed by: INTERNAL MEDICINE

## 2020-01-15 PROCEDURE — 82150 ASSAY OF AMYLASE: CPT | Mod: HCNC

## 2020-01-15 PROCEDURE — 88112 PR  CYTOPATH, CELL ENHANCE TECH: ICD-10-PCS | Mod: 26,HCNC,, | Performed by: PATHOLOGY

## 2020-01-15 PROCEDURE — 71046 X-RAY EXAM CHEST 2 VIEWS: CPT | Mod: 26,HCNC,, | Performed by: RADIOLOGY

## 2020-01-15 PROCEDURE — 1159F MED LIST DOCD IN RCRD: CPT | Mod: HCNC,S$GLB,, | Performed by: INTERNAL MEDICINE

## 2020-01-15 NOTE — LETTER
January 19, 2020      Emelina Gaston MD  [address]  [facility] [address]           [facility] - Pulmonary Services  [address]  [facility] [address]  Phone: [phone]          Patient: Myesha Quinones   MR Number: [id number]   YOB: 1940   Date of Visit: 1/15/2020       Dear Dr. Emelina Gaston:    Thank you for referring Myesha Quinones to me for evaluation. Attached you will find relevant portions of my assessment and plan of care.    If you have questions, please do not hesitate to call me. I look forward to following Myesha Quinones along with you.    Sincerely,    Loretta Gaspar MD    Enclosure  CC:  No Recipients    If you would like to receive this communication electronically, please contact [email or web address] or [phone] to request more information on [employer] access.    For providers and/or their staff who would like to refer a patient to [facility], please contact us through our one-stop-shop provider referral line, [facility], at [phone].    If you feel you have received this communication in error or would no longer like to receive these types of communications, please e-mail [email or web address]

## 2020-01-15 NOTE — PROGRESS NOTES
[facility] - Pulmonary Services  Thoracentesis  Procedure Note    SUMMARY     Date of Procedure: 01/15/2020     Procedure: Thoracentesis    Indications: Diagnostic    Pre-Operative Diagnosis: Pleural effusion    Post-Operative Diagnosis: same    Anesthesia: local    Technical Procedures Used:    Description of the Findings of the Procedure:     Consent: Informed consent was obtained. Risks of the procedure were discussed including: infection, bleeding, pain, pneumothorax.    Under sterile conditions the patient was positioned. Chlorhexadine solution and sterile drapes were utilized. 5 cc 1% plain lidocaine was used to anesthetize between the rib space on the right after localized under ultrasound. Fluid was obtained after catheter inserted without  difficulty and suction applied with minimal blood loss.  A dressing was applied to the wound and wound care instructions were provided.     1250 ml of clear pleural fluid was obtained. A sample was sent to Pathology for cytogenetics, flow, and cell counts, as well as for infection analysis.    Plan:    A follow up chest x-ray was ordered. Yes      Significant Surgical Tasks Conducted by the Assistant(s), if Applicable:    Complications: None; patient tolerated the procedure well.    Estimated Blood Loss (EBL): None    Attestation: I performed the procedure.

## 2020-01-17 ENCOUNTER — LAB VISIT (OUTPATIENT)
Dept: LAB | Facility: HOSPITAL | Age: 80
End: 2020-01-17
Attending: INTERNAL MEDICINE
Payer: MEDICARE

## 2020-01-17 DIAGNOSIS — N18.30 TYPE 2 DIABETES MELLITUS WITH STAGE 3 CHRONIC KIDNEY DISEASE, WITH LONG-TERM CURRENT USE OF INSULIN: ICD-10-CM

## 2020-01-17 DIAGNOSIS — Z79.4 TYPE 2 DIABETES MELLITUS WITH STAGE 3 CHRONIC KIDNEY DISEASE, WITH LONG-TERM CURRENT USE OF INSULIN: ICD-10-CM

## 2020-01-17 DIAGNOSIS — E55.9 MILD VITAMIN D DEFICIENCY: ICD-10-CM

## 2020-01-17 DIAGNOSIS — E78.5 HYPERLIPIDEMIA, UNSPECIFIED HYPERLIPIDEMIA TYPE: ICD-10-CM

## 2020-01-17 DIAGNOSIS — E11.22 TYPE 2 DIABETES MELLITUS WITH STAGE 3 CHRONIC KIDNEY DISEASE, WITH LONG-TERM CURRENT USE OF INSULIN: ICD-10-CM

## 2020-01-17 DIAGNOSIS — N18.30 CHRONIC KIDNEY DISEASE, STAGE 3, MOD DECREASED GFR: ICD-10-CM

## 2020-01-17 DIAGNOSIS — I10 ESSENTIAL HYPERTENSION: ICD-10-CM

## 2020-01-17 LAB
25(OH)D3+25(OH)D2 SERPL-MCNC: 35 NG/ML (ref 30–96)
ALBUMIN SERPL BCP-MCNC: 3 G/DL (ref 3.5–5.2)
ALP SERPL-CCNC: 100 U/L (ref 55–135)
ALT SERPL W/O P-5'-P-CCNC: 14 U/L (ref 10–44)
ANION GAP SERPL CALC-SCNC: 9 MMOL/L (ref 8–16)
ANION GAP SERPL CALC-SCNC: 9 MMOL/L (ref 8–16)
AST SERPL-CCNC: 20 U/L (ref 10–40)
BASOPHILS # BLD AUTO: 0.05 K/UL (ref 0–0.2)
BASOPHILS # BLD AUTO: 0.05 K/UL (ref 0–0.2)
BASOPHILS NFR BLD: 0.6 % (ref 0–1.9)
BASOPHILS NFR BLD: 0.6 % (ref 0–1.9)
BILIRUB SERPL-MCNC: 0.3 MG/DL (ref 0.1–1)
BUN SERPL-MCNC: 35 MG/DL (ref 8–23)
BUN SERPL-MCNC: 35 MG/DL (ref 8–23)
CALCIUM SERPL-MCNC: 9.6 MG/DL (ref 8.7–10.5)
CALCIUM SERPL-MCNC: 9.6 MG/DL (ref 8.7–10.5)
CHLORIDE SERPL-SCNC: 103 MMOL/L (ref 95–110)
CHLORIDE SERPL-SCNC: 103 MMOL/L (ref 95–110)
CHOLEST SERPL-MCNC: 131 MG/DL (ref 120–199)
CHOLEST/HDLC SERPL: 3.1 {RATIO} (ref 2–5)
CO2 SERPL-SCNC: 24 MMOL/L (ref 23–29)
CO2 SERPL-SCNC: 24 MMOL/L (ref 23–29)
CREAT SERPL-MCNC: 1.4 MG/DL (ref 0.5–1.4)
CREAT SERPL-MCNC: 1.4 MG/DL (ref 0.5–1.4)
DIFFERENTIAL METHOD: ABNORMAL
DIFFERENTIAL METHOD: ABNORMAL
EOSINOPHIL # BLD AUTO: 0.6 K/UL (ref 0–0.5)
EOSINOPHIL # BLD AUTO: 0.6 K/UL (ref 0–0.5)
EOSINOPHIL NFR BLD: 6.4 % (ref 0–8)
EOSINOPHIL NFR BLD: 6.4 % (ref 0–8)
ERYTHROCYTE [DISTWIDTH] IN BLOOD BY AUTOMATED COUNT: 13 % (ref 11.5–14.5)
ERYTHROCYTE [DISTWIDTH] IN BLOOD BY AUTOMATED COUNT: 13 % (ref 11.5–14.5)
EST. GFR  (AFRICAN AMERICAN): 40.9 ML/MIN/1.73 M^2
EST. GFR  (AFRICAN AMERICAN): 40.9 ML/MIN/1.73 M^2
EST. GFR  (NON AFRICAN AMERICAN): 35.5 ML/MIN/1.73 M^2
EST. GFR  (NON AFRICAN AMERICAN): 35.5 ML/MIN/1.73 M^2
ESTIMATED AVG GLUCOSE: 146 MG/DL (ref 68–131)
FINAL PATHOLOGIC DIAGNOSIS: NORMAL
GLUCOSE SERPL-MCNC: 71 MG/DL (ref 70–110)
GLUCOSE SERPL-MCNC: 71 MG/DL (ref 70–110)
HBA1C MFR BLD HPLC: 6.7 % (ref 4–5.6)
HCT VFR BLD AUTO: 33.2 % (ref 37–48.5)
HCT VFR BLD AUTO: 33.2 % (ref 37–48.5)
HDLC SERPL-MCNC: 42 MG/DL (ref 40–75)
HDLC SERPL: 32.1 % (ref 20–50)
HGB BLD-MCNC: 10.1 G/DL (ref 12–16)
HGB BLD-MCNC: 10.1 G/DL (ref 12–16)
IMM GRANULOCYTES # BLD AUTO: 0.07 K/UL (ref 0–0.04)
IMM GRANULOCYTES # BLD AUTO: 0.07 K/UL (ref 0–0.04)
IMM GRANULOCYTES NFR BLD AUTO: 0.8 % (ref 0–0.5)
IMM GRANULOCYTES NFR BLD AUTO: 0.8 % (ref 0–0.5)
LDLC SERPL CALC-MCNC: 58.6 MG/DL (ref 63–159)
LYMPHOCYTES # BLD AUTO: 1.7 K/UL (ref 1–4.8)
LYMPHOCYTES # BLD AUTO: 1.7 K/UL (ref 1–4.8)
LYMPHOCYTES NFR BLD: 19.9 % (ref 18–48)
LYMPHOCYTES NFR BLD: 19.9 % (ref 18–48)
MCH RBC QN AUTO: 28.6 PG (ref 27–31)
MCH RBC QN AUTO: 28.6 PG (ref 27–31)
MCHC RBC AUTO-ENTMCNC: 30.4 G/DL (ref 32–36)
MCHC RBC AUTO-ENTMCNC: 30.4 G/DL (ref 32–36)
MCV RBC AUTO: 94 FL (ref 82–98)
MCV RBC AUTO: 94 FL (ref 82–98)
MONOCYTES # BLD AUTO: 0.9 K/UL (ref 0.3–1)
MONOCYTES # BLD AUTO: 0.9 K/UL (ref 0.3–1)
MONOCYTES NFR BLD: 9.8 % (ref 4–15)
MONOCYTES NFR BLD: 9.8 % (ref 4–15)
NEUTROPHILS # BLD AUTO: 5.4 K/UL (ref 1.8–7.7)
NEUTROPHILS # BLD AUTO: 5.4 K/UL (ref 1.8–7.7)
NEUTROPHILS NFR BLD: 62.5 % (ref 38–73)
NEUTROPHILS NFR BLD: 62.5 % (ref 38–73)
NONHDLC SERPL-MCNC: 89 MG/DL
NRBC BLD-RTO: 0 /100 WBC
NRBC BLD-RTO: 0 /100 WBC
PLATELET # BLD AUTO: 295 K/UL (ref 150–350)
PLATELET # BLD AUTO: 295 K/UL (ref 150–350)
PMV BLD AUTO: 11.6 FL (ref 9.2–12.9)
PMV BLD AUTO: 11.6 FL (ref 9.2–12.9)
POTASSIUM SERPL-SCNC: 4.4 MMOL/L (ref 3.5–5.1)
POTASSIUM SERPL-SCNC: 4.4 MMOL/L (ref 3.5–5.1)
PROT SERPL-MCNC: 7.3 G/DL (ref 6–8.4)
RBC # BLD AUTO: 3.53 M/UL (ref 4–5.4)
RBC # BLD AUTO: 3.53 M/UL (ref 4–5.4)
SODIUM SERPL-SCNC: 136 MMOL/L (ref 136–145)
SODIUM SERPL-SCNC: 136 MMOL/L (ref 136–145)
TRIGL SERPL-MCNC: 152 MG/DL (ref 30–150)
TSH SERPL DL<=0.005 MIU/L-ACNC: 0.45 UIU/ML (ref 0.4–4)
WBC # BLD AUTO: 8.63 K/UL (ref 3.9–12.7)
WBC # BLD AUTO: 8.63 K/UL (ref 3.9–12.7)

## 2020-01-17 PROCEDURE — 84443 ASSAY THYROID STIM HORMONE: CPT | Mod: HCNC

## 2020-01-17 PROCEDURE — 85025 COMPLETE CBC W/AUTO DIFF WBC: CPT | Mod: HCNC

## 2020-01-17 PROCEDURE — 83036 HEMOGLOBIN GLYCOSYLATED A1C: CPT | Mod: HCNC

## 2020-01-17 PROCEDURE — 80053 COMPREHEN METABOLIC PANEL: CPT | Mod: HCNC

## 2020-01-17 PROCEDURE — 80061 LIPID PANEL: CPT | Mod: HCNC

## 2020-01-17 PROCEDURE — 36415 COLL VENOUS BLD VENIPUNCTURE: CPT | Mod: HCNC,PO

## 2020-01-17 PROCEDURE — 82306 VITAMIN D 25 HYDROXY: CPT | Mod: HCNC

## 2020-01-18 LAB
BACTERIA SPEC AEROBE CULT: NO GROWTH
SPECIMEN SOURCE: NORMAL
TRIGL FLD-MCNC: 55 MG/DL

## 2020-01-21 LAB — FUNGUS SPEC CULT: NORMAL

## 2020-01-22 ENCOUNTER — OFFICE VISIT (OUTPATIENT)
Dept: INTERNAL MEDICINE | Facility: CLINIC | Age: 80
End: 2020-01-22
Payer: MEDICARE

## 2020-01-22 VITALS
WEIGHT: 151.88 LBS | OXYGEN SATURATION: 98 % | HEIGHT: 63 IN | HEART RATE: 88 BPM | DIASTOLIC BLOOD PRESSURE: 64 MMHG | BODY MASS INDEX: 26.91 KG/M2 | SYSTOLIC BLOOD PRESSURE: 160 MMHG

## 2020-01-22 DIAGNOSIS — J90 PLEURAL EFFUSION: Primary | ICD-10-CM

## 2020-01-22 DIAGNOSIS — Z79.4 CONTROLLED TYPE 2 DIABETES MELLITUS WITH BOTH EYES AFFECTED BY MILD NONPROLIFERATIVE RETINOPATHY AND MACULAR EDEMA, WITH LONG-TERM CURRENT USE OF INSULIN: ICD-10-CM

## 2020-01-22 DIAGNOSIS — S22.31XA CLOSED FRACTURE OF ONE RIB OF RIGHT SIDE, INITIAL ENCOUNTER: ICD-10-CM

## 2020-01-22 DIAGNOSIS — N18.30 TYPE 2 DIABETES MELLITUS WITH STAGE 3 CHRONIC KIDNEY DISEASE, WITH LONG-TERM CURRENT USE OF INSULIN: ICD-10-CM

## 2020-01-22 DIAGNOSIS — E11.3213 CONTROLLED TYPE 2 DIABETES MELLITUS WITH BOTH EYES AFFECTED BY MILD NONPROLIFERATIVE RETINOPATHY AND MACULAR EDEMA, WITH LONG-TERM CURRENT USE OF INSULIN: ICD-10-CM

## 2020-01-22 DIAGNOSIS — I10 ESSENTIAL HYPERTENSION: ICD-10-CM

## 2020-01-22 DIAGNOSIS — D64.9 ANEMIA, UNSPECIFIED TYPE: ICD-10-CM

## 2020-01-22 DIAGNOSIS — Z79.4 TYPE 2 DIABETES MELLITUS WITH STAGE 3 CHRONIC KIDNEY DISEASE, WITH LONG-TERM CURRENT USE OF INSULIN: ICD-10-CM

## 2020-01-22 DIAGNOSIS — E11.22 TYPE 2 DIABETES MELLITUS WITH STAGE 3 CHRONIC KIDNEY DISEASE, WITH LONG-TERM CURRENT USE OF INSULIN: ICD-10-CM

## 2020-01-22 PROBLEM — N17.9 AKI (ACUTE KIDNEY INJURY): Status: RESOLVED | Noted: 2019-12-07 | Resolved: 2020-01-22

## 2020-01-22 PROBLEM — J15.1: Status: RESOLVED | Noted: 2019-12-17 | Resolved: 2020-01-22

## 2020-01-22 PROCEDURE — 1101F PR PT FALLS ASSESS DOC 0-1 FALLS W/OUT INJ PAST YR: ICD-10-PCS | Mod: HCNC,CPTII,S$GLB, | Performed by: INTERNAL MEDICINE

## 2020-01-22 PROCEDURE — 1159F PR MEDICATION LIST DOCUMENTED IN MEDICAL RECORD: ICD-10-PCS | Mod: HCNC,S$GLB,, | Performed by: INTERNAL MEDICINE

## 2020-01-22 PROCEDURE — 99214 PR OFFICE/OUTPT VISIT, EST, LEVL IV, 30-39 MIN: ICD-10-PCS | Mod: HCNC,S$GLB,, | Performed by: INTERNAL MEDICINE

## 2020-01-22 PROCEDURE — 3078F DIAST BP <80 MM HG: CPT | Mod: HCNC,CPTII,S$GLB, | Performed by: INTERNAL MEDICINE

## 2020-01-22 PROCEDURE — 1125F AMNT PAIN NOTED PAIN PRSNT: CPT | Mod: HCNC,S$GLB,, | Performed by: INTERNAL MEDICINE

## 2020-01-22 PROCEDURE — 3078F PR MOST RECENT DIASTOLIC BLOOD PRESSURE < 80 MM HG: ICD-10-PCS | Mod: HCNC,CPTII,S$GLB, | Performed by: INTERNAL MEDICINE

## 2020-01-22 PROCEDURE — 99214 OFFICE O/P EST MOD 30 MIN: CPT | Mod: HCNC,S$GLB,, | Performed by: INTERNAL MEDICINE

## 2020-01-22 PROCEDURE — 1101F PT FALLS ASSESS-DOCD LE1/YR: CPT | Mod: HCNC,CPTII,S$GLB, | Performed by: INTERNAL MEDICINE

## 2020-01-22 PROCEDURE — 3077F PR MOST RECENT SYSTOLIC BLOOD PRESSURE >= 140 MM HG: ICD-10-PCS | Mod: HCNC,CPTII,S$GLB, | Performed by: INTERNAL MEDICINE

## 2020-01-22 PROCEDURE — 1125F PR PAIN SEVERITY QUANTIFIED, PAIN PRESENT: ICD-10-PCS | Mod: HCNC,S$GLB,, | Performed by: INTERNAL MEDICINE

## 2020-01-22 PROCEDURE — 99999 PR PBB SHADOW E&M-EST. PATIENT-LVL III: ICD-10-PCS | Mod: PBBFAC,HCNC,, | Performed by: INTERNAL MEDICINE

## 2020-01-22 PROCEDURE — 3077F SYST BP >= 140 MM HG: CPT | Mod: HCNC,CPTII,S$GLB, | Performed by: INTERNAL MEDICINE

## 2020-01-22 PROCEDURE — 1159F MED LIST DOCD IN RCRD: CPT | Mod: HCNC,S$GLB,, | Performed by: INTERNAL MEDICINE

## 2020-01-22 PROCEDURE — 99999 PR PBB SHADOW E&M-EST. PATIENT-LVL III: CPT | Mod: PBBFAC,HCNC,, | Performed by: INTERNAL MEDICINE

## 2020-01-22 NOTE — PROGRESS NOTES
Subjective:      Patient ID: Myesha Quinones is a 79 y.o. female.    Chief Complaint: Follow-up    HPI:  HPI     Patient is here in follow-up of complications of a fall and subsequent when community acquired pneumonia and hospital-acquired pneumonia.      At the last appointment I contacted her pulmonary doctor for evaluation and removal of fluid frm a continued pleural effusion 1st drained during her hospitalization.  She tells me she has had to drainage procedures and the last cytology was negative for malignancy.      Patient also had laboratory studies done prior to the appointment.  Her diabetes is better controlled and her protein levels have  Increased.   She is drinking 1 glucerna a day.  She has chronic kidney disease stage 3 which is stable.  Since the drainage of the pleural effusion her hemoglobin is slightly decrease in will continue to be monitored.      She also has a closed rib fracture and the pain on although not completely resolved it is better.      Patient's blood pressure is elevated today although I believe that this is related to anxiety and will not change the medicine although I will see her within 1 month and encouraged her to continue to monitor her blood pressure    Diabetes Management Status    Statin: Taking  ACE/ARB: Taking    Screening or Prevention Patient's value Goal Complete/Controlled?   HgA1C Testing and Control   Lab Results   Component Value Date    HGBA1C 6.7 (H) 01/17/2020      Annually/Less than 8% Yes   Lipid profile : 01/17/2020 Annually Yes   LDL control Lab Results   Component Value Date    LDLCALC 58.6 (L) 01/17/2020    Annually/Less than 100 mg/dl  Yes   Nephropathy screening Lab Results   Component Value Date    LABMICR 335.0 01/21/2019     Lab Results   Component Value Date    PROTEINUA 2+ (A) 01/17/2020    Annually Yes   Blood pressure BP Readings from Last 1 Encounters:   01/22/20 (!) 160/64    Less than 140/90 No   Dilated retinal exam : 01/02/2020 Annually  Yes   Foot exam   : 01/28/2019 Annually Yes       Patient Active Problem List   Diagnosis    History of nonmelanoma skin cancer    Nonischemic cardiomyopathy    LBBB (left bundle branch block)    Nontoxic multinodular goiter    Meningioma    Asthma, chronic    Biventricular ICD (implantable cardioverter-defibrillator) in place    Tremor    Coronary artery disease involving native coronary artery without angina pectoris - Non-obstructive 50% LAD    Essential hypertension    Hyperlipidemia    History of breast cancer    Adrenal cortical nodule: repeat CT 6/2016    Calcification of aorta    Diabetic polyneuropathy associated with type 2 diabetes mellitus    Osteoporosis    KHOA (obstructive sleep apnea)    Type 2 diabetes mellitus with stage 3 chronic kidney disease, with long-term current use of insulin    Chronic kidney disease, stage 3, mod decreased GFR    Iron deficiency anemia    Chemotherapy-induced neuropathy    Hypomagnesemia    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, with long-term current use of insulin    Hypertensive retinopathy, bilateral    Multiple lung nodules    COPD (chronic obstructive pulmonary disease)    Mixed conductive and sensorineural hearing loss    ICD (implantable cardioverter-defibrillator) battery depletion    Type 2 DM with CKD stage 3 and hypertension    Cardiomyopathy, ischemic    Metabolic bone disease    Proteinuria    Seasonal allergies    Pseudomonas pneumonia    Rib fracture    Parapneumonic effusion     Past Medical History:   Diagnosis Date    Acute hypoxemic respiratory failure 12/19/2019    Acute right-sided thoracic back pain 12/9/2019    Allergy     Asthma     Basal cell carcinoma     left forehead    Basal cell carcinoma     left nose    Basal cell carcinoma 05/20/2015    right nose    Basal cell carcinoma 12/22/2015    left lower post neck    Breast  cancer     CAD (coronary artery disease)     Cardiomyopathy     Cardiomyopathy, ischemic     Cataract     CHF (congestive heart failure)     Chronic kidney disease, stage 3, mod decreased GFR 2/14/2017    Colon polyp 2011    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, with long-term current use of insulin 2/22/2018    COPD (chronic obstructive pulmonary disease)     COPD exacerbation 4/8/2018    Defibrillator discharge     Diabetes mellitus     Diabetes mellitus type II     Diabetes with neurologic complications     Goiter     MNG    HX: breast cancer     Hyperlipidemia     Hypertension     Iron deficiency anemia 5/16/2017    Left kidney mass     Meningioma     Osteoporosis, postmenopausal     Pacemaker     Pneumonia 12/8/2019    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    Pseudomonas pneumonia     Skin cancer     s/p excision    Sleep apnea     CPAP    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/2015    mid forehead    Unspecified vitamin D deficiency     Ventricular tachycardia     Vitamin B12 deficiency     Vitamin D deficiency disease      Past Surgical History:   Procedure Laterality Date    BASAL CELL CARCINOMA EXCISION      posterior neck and nose    BREAST BIOPSY      BREAST CYST EXCISION Left     BREAST SURGERY      CARDIAC DEFIBRILLATOR PLACEMENT      x 2    CATARACT EXTRACTION W/  INTRAOCULAR LENS IMPLANT Bilateral     CHOLECYSTECTOMY      COLONOSCOPY N/A 11/5/2019    Procedure: COLONOSCOPY;  Surgeon: Boaz Botello MD;  Location: [facility] ([address] [place] [address]);  Service: Endoscopy;  Laterality: N/A;  AICD - [employer] -     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    FRACTURE SURGERY      left elbow and wrist as a child    HYSTERECTOMY      MASTECTOMY Right     REPLACEMENT OF IMPLANTABLE CARDIOVERTER-DEFIBRILLATOR (ICD) GENERATOR N/A 12/17/2018    Procedure: REPLACEMENT, ICD GENERATOR;  Surgeon: Jan  MD Mike;  Location: [facility];  Service: Cardiology;  Laterality: N/A;  DONNA, CRT-D gen change, MDT, MAC, SK, 3 Prep    REVISION OF SKIN POCKET FOR CARDIOVERTER-DEFIBRILLATOR  12/17/2018    Procedure: Revision, Skin Pocket, For Cardioverter-Defibrillator;  Surgeon: Jan Mckeon MD;  Location: [facility] EP LAB;  Service: Cardiology;;    SQUAMOUS CELL CARCINOMA EXCISION      remved from forehead    TONSILLECTOMY       Family History   Problem Relation Age of Onset    Diabetes Father     Heart disease Father     Diabetes Sister     Heart disease Sister     Diabetes Brother     Heart disease Brother     Hypertension Brother     Diabetes Brother     Heart disease Brother     Hypertension Brother     Diabetes Brother     Heart disease Brother     Cancer Brother         colon    Diabetes Brother     Cancer Son         skin    Diabetes Son         prediabetes    Diabetes Daughter         prediabetes    Cancer Daughter         melanoma    Obesity Daughter     Melanoma Daughter     Diabetes Son     Asthma Mother     Hypertension Mother     Stroke Mother     No Known Problems Maternal Grandmother     No Known Problems Maternal Grandfather     No Known Problems Paternal Grandmother     No Known Problems Paternal Grandfather     Amblyopia Neg Hx     Blindness Neg Hx     Cataracts Neg Hx     Glaucoma Neg Hx     Macular degeneration Neg Hx     Retinal detachment Neg Hx     Strabismus Neg Hx     Thyroid disease Neg Hx      Review of Systems   Constitutional: Negative for chills, fever and unexpected weight change.   HENT: Negative for trouble swallowing.    Respiratory: Negative for cough, shortness of breath and wheezing.         SOb improved   Cardiovascular: Negative for chest pain and palpitations.   Gastrointestinal: Negative for abdominal distention, abdominal pain, blood in stool and vomiting.   Musculoskeletal: Negative for back pain.     Objective:     Vitals:    01/22/20 0806   BP:  "(!) 160/64   Pulse: 88   SpO2: 98%   Weight: 68.9 kg (151 lb 14.4 oz)   Height: 5' 3" (1.6 m)   PainSc:   2     Body mass index is 26.91 kg/m².  Physical Exam   Constitutional: She is oriented to person, place, and time. She appears well-developed and well-nourished. No distress.   Neck: Carotid bruit is not present. No thyromegaly present.   Cardiovascular: Normal rate, regular rhythm and normal heart sounds. PMI is not displaced.   Pulmonary/Chest: Effort normal and breath sounds normal. No respiratory distress.   Decrease in right breath sounds   Abdominal: Soft. Bowel sounds are normal. She exhibits no distension. There is no tenderness.   Musculoskeletal: She exhibits no edema.   Neurological: She is alert and oriented to person, place, and time.     Assessment:     1. Pleural effusion    2. Type 2 diabetes mellitus with stage 3 chronic kidney disease, with long-term current use of insulin    3. Closed fracture of one rib of right side, initial encounter    4. 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, with long-term current use of insulin    5. Anemia, unspecified type    6. Essential hypertension      Plan:   Myesha was seen today for follow-up.    Diagnoses and all orders for this visit:    Pleural effusion  Comments:  Monitor in one month: doing well, no malignancy found at present  Orders:  -     X-Ray Chest PA And Lateral; Future    Type 2 diabetes mellitus with stage 3 chronic kidney disease, with long-term current use of insulin  Comments:  CKD stable, monitored    Closed fracture of one rib of right side, initial encounter  Comments:  Symptoms improving    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, with long-term current use of insulin  Comments:  A1C less than 7, monitor    Anemia, unspecified type  Comments:  Monitor, lab in 3 months    Essential " hypertension  Comments:   elevated, likely related to stress and pain encouraged her to continue to monitor this and recheck in 1 month.        Problem List Items Addressed This Visit     Essential hypertension    Type 2 diabetes mellitus with stage 3 chronic kidney disease, with long-term current use of insulin    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, with long-term current use of insulin    Rib fracture      Other Visit Diagnoses     Pleural effusion    -  Primary    Monitor in one month: doing well, no malignancy found at present    Relevant Orders    X-Ray Chest PA And Lateral    Anemia, unspecified type        Monitor, lab in 3 months        Orders Placed This Encounter   Procedures    X-Ray Chest PA And Lateral     Standing Status:   Future     Standing Expiration Date:   3/22/2020     Follow up in about 1 month (around 2/22/2020) for Follow up with CXR.     Medication List           Accurate as of January 22, 2020  9:09 AM. If you have any questions, ask your nurse or doctor.               CHANGE how you take these medications    blood sugar diagnostic Strp  Commonly known as:  Accu-Chek Angelica  Uses Accu-Check Angelica meter to test BG 4x/day  What changed:  additional instructions     insulin glargine 100 units/mL (3mL) SubQ pen  Commonly known as:  Lantus Solostar U-100 Insulin  Inject 15 Units into the skin every evening.  What changed:  how much to take     lancets Misc  Commonly known as:  Accu-Chek Softclix Lancets  Uses Accu-Chek Angelica meter to test BG 4x/day  What changed:  additional instructions        CONTINUE taking these medications    acetaminophen 500 MG tablet  Commonly known as:  TYLENOL     B-12 DOTS ORAL     benzonatate 100 MG capsule  Commonly known as:  Tessalon Perles  Take 2 capsules (200 mg total) by mouth 3 (three) times daily as needed for Cough.     carvedilol 25 MG tablet  Commonly known as:  COREG  Take 1  "tablet (25 mg total) by mouth 2 (two) times daily with meals.     chlorthalidone 25 MG Tab  Commonly known as:  HYGROTEN  Take 1 tablet (25 mg total) by mouth once daily.     cloNIDine 0.1 mg/24 hr td ptwk 0.1 mg/24 hr  Commonly known as:  CATAPRES  Place 1 patch onto the skin every 7 days. (Saturdays)     diltiaZEM 120 MG Cp24  Commonly known as:  CARDIZEM CD  Take 1 capsule (120 mg total) by mouth once daily.     Enteric Coated Aspirin 81 MG EC tablet  Generic drug:  aspirin     Fish Oil 500 mg Cap  Generic drug:  omega-3 fatty acids     fluticasone propionate 50 mcg/actuation nasal spray  Commonly known as:  FLONASE  2 sprays (100 mcg total) by Each Nare route once daily.     hydrALAZINE 100 MG tablet  Commonly known as:  APRESOLINE  Take 1 tablet (100 mg total) by mouth 3 (three) times daily.     irbesartan 300 MG tablet  Commonly known as:  AVAPRO  Take 1 tablet (300 mg total) by mouth every evening.     levocetirizine 5 MG tablet  Commonly known as:  XYZAL  Take 1 tablet (5 mg total) by mouth every evening.     lidocaine 5 %  Commonly known as:  LIDODERM  Place 1 patch onto the skin once daily. Place patch to right back. Leave on for 12 hours and remove for 12 hours.     linaGLIPtin 5 mg Tab tablet  Commonly known as:  Tradjenta  Take 1 tablet (5 mg total) by mouth once daily.     magnesium oxide 400 mg (241.3 mg magnesium) tablet  Commonly known as:  MAG-OX  Take 1 tablet (400 mg total) by mouth once daily.     metFORMIN 500 MG tablet  Commonly known as:  GLUCOPHAGE  Take 1 tablet (500 mg total) by mouth 2 (two) times daily with meals.     nitroGLYCERIN 0.4 MG SL tablet  Commonly known as:  NITROSTAT     ondansetron 4 MG Tbdl  Commonly known as:  ZOFRAN-ODT  Take 1 tablet (4 mg total) by mouth every 6 (six) hours as needed.     pen needle, diabetic 31 gauge x 3/16" Ndle  Commonly known as:  BD Ultra-Fine Mini Pen Needle  USE WITH LANTUS AT BEDTIME     pravastatin 40 MG tablet  Commonly known as:  " PRAVACHOL  Take 1 tablet (40 mg total) by mouth once daily.     traMADol 50 mg tablet  Commonly known as:  ULTRAM  Take 1 tablet (50 mg total) by mouth nightly as needed for Pain. New right thoracic apin     Ventolin HFA 90 mcg/actuation inhaler  Generic drug:  albuterol  INHALE 2 PUFFS INTO THE LUNGS EVERY 6 (SIX) HOURS AS NEEDED FOR WHEEZING. RESCUE     Vitamin D3 2,000 unit Tab  Generic drug:  cholecalciferol (vitamin D3)     Wixela Inhub 250-50 mcg/dose diskus inhaler  Generic drug:  fluticasone-salmeterol 250-50 mcg/dose  Inhale 1 puff into the lungs 2 (two) times daily. This is a change from one month

## 2020-01-23 ENCOUNTER — OFFICE VISIT (OUTPATIENT)
Dept: DERMATOLOGY | Facility: CLINIC | Age: 80
End: 2020-01-23
Payer: MEDICARE

## 2020-01-23 DIAGNOSIS — Z09 POSTOP CHECK: Primary | ICD-10-CM

## 2020-01-23 PROCEDURE — 99999 PR PBB SHADOW E&M-EST. PATIENT-LVL II: ICD-10-PCS | Mod: PBBFAC,HCNC,, | Performed by: DERMATOLOGY

## 2020-01-23 PROCEDURE — 99999 PR PBB SHADOW E&M-EST. PATIENT-LVL II: CPT | Mod: PBBFAC,HCNC,, | Performed by: DERMATOLOGY

## 2020-01-23 PROCEDURE — 99024 PR POST-OP FOLLOW-UP VISIT: ICD-10-PCS | Mod: HCNC,S$GLB,, | Performed by: DERMATOLOGY

## 2020-01-23 PROCEDURE — 99024 POSTOP FOLLOW-UP VISIT: CPT | Mod: HCNC,S$GLB,, | Performed by: DERMATOLOGY

## 2020-01-23 NOTE — PROGRESS NOTES
79 y.o. female patient is here for suture removal following Mohs' surgery.    Patient reports no problems.    WOUND PE:  The L anterior scalp sutures intact. Wound healing well. Good skin edges. No signs or symptoms of infection.    IMPRESSION:  Healing operative site from Mohs' surgery, BCC L anterior scalp s/p Mohs with CLC, postop day #14.    PLAN:  Sutures removed today. Steri-strips applied.  Continue wound care.  Keep moist with Aquaphor.  Call if any issues arise.    RTC:  In 3-6 months with Erika Nguyen M.D. for skin check or sooner if new concern arises.

## 2020-01-27 ENCOUNTER — PES CALL (OUTPATIENT)
Dept: ADMINISTRATIVE | Facility: CLINIC | Age: 80
End: 2020-01-27

## 2020-01-29 ENCOUNTER — PATIENT OUTREACH (OUTPATIENT)
Dept: OTHER | Facility: OTHER | Age: 80
End: 2020-01-29

## 2020-01-29 NOTE — PROGRESS NOTES
"Digital Medicine: Health  Follow-Up    Patient recently was seen by PCP and Pulmonologist. Patient reports she did get fluid drained out her lungs. Patient reports she will see her cardiologist, Dr. Fernando in the next couple of week. Patient reports she will be discussing blood pressure medication with him. Patient states, "I feel so much better than last month".     The history is provided by the patient. No  was used.     Follow Up  Follow-up reason(s): reading review and routine education      Readings are trending down   Routine Education Topics: eating patterns and physical activity      INTERVENTION(S)  encouragement/support, denied resources and denied questions    PLAN  patient verbalizes understanding, patient amenable to changes, Clinician follow-up and continue monitoring    There are no preventive care reminders to display for this patient.    Last 5 Patient Entered Readings                                      Current 30 Day Average: 138/66     Recent Readings 1/25/2020 1/18/2020 1/14/2020 1/13/2020 1/13/2020    SBP (mmHg) 106 133 133 155 162    DBP (mmHg) 55 63 63 68 73    Pulse 84 81 80 84 84        Asked patient about lower readings on 1/25/20.  Patient reports when blood pressure is that low she gets a "weird feeling". Patient was not specific on what she is feeling. Patient reports she does drink water and eating something salty. Patient reports after she does that the "weird feeling" passes. Notified PharmD.     Screenings    Patient and I did not discuss lifestyle at this time. I will continue to monitor and follow-up.   "

## 2020-01-31 NOTE — PROGRESS NOTES
Digital Medicine: Clinician Follow-Up    Called patient to follow up. Patient endorses adherence to medication regimen. Patient denies hypotensive s/sx (lightheadedness, dizziness, nausea, fatigue); patient denies hypertensive s/sx (SOB, CP, severe headaches, changes in vision). Instructed patient to seek medical care if BP > 180/110 and is accompanied by hypertensive s/sx associated, patient confirms understanding.     She reports that she feels weakness sometimes after she takes her antihypertensive medications. She states that she previously had pneumonia which caused her to feel weak and believe that it may be associated with her illness.     When asked about her hydration status, she reports that she does not drink enough water.    The history is provided by the patient. No  was used.     Follow Up  Follow-up reason(s): responding to task        Last 5 Patient Entered Readings                                      Current 30 Day Average: 139/67     Recent Readings 1/29/2020 1/25/2020 1/18/2020 1/14/2020 1/13/2020    SBP (mmHg) 145 106 133 133 155    DBP (mmHg) 68 55 63 63 68    Pulse 78 84 81 80 84        INTERVENTION(S)  reviewed appropriate dose schedule, encouragement/support and goal setting    PLAN  patient verbalizes understanding and continue monitoring    Assessment:  Reviewed recent readings. Per 2017 ACC/ AHA HTN guidelines (goal of BP < 130/80), current 30-day average need to be addressed more throroughly today.     Plan:  >Continue current medication regimen.   >In future, may recommend decreased dose of chlorthalidone to 12.5 mg daily. Patient's DBP tends to run 50-70 mmHg.  >Informed patient to work up to consuming 64 ounces of water daily.   >Reviewed proper BP measurement techniques with patient.   >I will continue to monitor regularly and will follow-up in ~6 weeks, sooner if blood pressure begins to trend upward or downward.   >Patient denies having questions or concerns.  Patient has my contact information and knows to call with any concerns or clinical changes.      There are no preventive care reminders to display for this patient.    Hypertension Medications             carvedilol (COREG) 25 MG tablet Take 1 tablet (25 mg total) by mouth 2 (two) times daily with meals.    chlorthalidone (HYGROTEN) 25 MG Tab Take 1 tablet (25 mg total) by mouth once daily.    cloNIDine 0.1 mg/24 hr td ptwk (CATAPRES) 0.1 mg/24 hr Place 1 patch onto the skin every 7 days. (Saturdays)    diltiaZEM (CARDIZEM CD) 120 MG Cp24 Take 1 capsule (120 mg total) by mouth once daily.    hydrALAZINE (APRESOLINE) 100 MG tablet Take 1 tablet (100 mg total) by mouth 3 (three) times daily.    irbesartan (AVAPRO) 300 MG tablet Take 1 tablet (300 mg total) by mouth every evening.    nitroGLYCERIN (NITROSTAT) 0.4 MG SL tablet Place 0.4 mg under the tongue every 5 (five) minutes as needed for Chest pain.

## 2020-02-04 ENCOUNTER — OFFICE VISIT (OUTPATIENT)
Dept: INTERNAL MEDICINE | Facility: CLINIC | Age: 80
End: 2020-02-04
Payer: MEDICARE

## 2020-02-04 ENCOUNTER — HOSPITAL ENCOUNTER (OUTPATIENT)
Dept: RADIOLOGY | Facility: HOSPITAL | Age: 80
Discharge: HOME | End: 2020-02-04
Attending: INTERNAL MEDICINE
Payer: MEDICARE

## 2020-02-04 VITALS
HEIGHT: 63 IN | DIASTOLIC BLOOD PRESSURE: 62 MMHG | BODY MASS INDEX: 26.91 KG/M2 | HEART RATE: 87 BPM | TEMPERATURE: 98 F | SYSTOLIC BLOOD PRESSURE: 160 MMHG | OXYGEN SATURATION: 95 %

## 2020-02-04 DIAGNOSIS — J90 PLEURAL EFFUSION: ICD-10-CM

## 2020-02-04 DIAGNOSIS — J90 PLEURAL EFFUSION: Primary | ICD-10-CM

## 2020-02-04 DIAGNOSIS — R42 DIZZINESS: ICD-10-CM

## 2020-02-04 PROCEDURE — 1101F PT FALLS ASSESS-DOCD LE1/YR: CPT | Mod: HCNC,CPTII,S$GLB, | Performed by: INTERNAL MEDICINE

## 2020-02-04 PROCEDURE — 99214 PR OFFICE/OUTPT VISIT, EST, LEVL IV, 30-39 MIN: ICD-10-PCS | Mod: HCNC,S$GLB,, | Performed by: INTERNAL MEDICINE

## 2020-02-04 PROCEDURE — 99999 PR PBB SHADOW E&M-EST. PATIENT-LVL III: CPT | Mod: PBBFAC,HCNC,, | Performed by: INTERNAL MEDICINE

## 2020-02-04 PROCEDURE — 3077F SYST BP >= 140 MM HG: CPT | Mod: HCNC,CPTII,S$GLB, | Performed by: INTERNAL MEDICINE

## 2020-02-04 PROCEDURE — 99214 OFFICE O/P EST MOD 30 MIN: CPT | Mod: HCNC,S$GLB,, | Performed by: INTERNAL MEDICINE

## 2020-02-04 PROCEDURE — 3078F DIAST BP <80 MM HG: CPT | Mod: HCNC,CPTII,S$GLB, | Performed by: INTERNAL MEDICINE

## 2020-02-04 PROCEDURE — 71046 X-RAY EXAM CHEST 2 VIEWS: CPT | Mod: TC,HCNC

## 2020-02-04 PROCEDURE — 1159F MED LIST DOCD IN RCRD: CPT | Mod: HCNC,S$GLB,, | Performed by: INTERNAL MEDICINE

## 2020-02-04 PROCEDURE — 71046 XR CHEST PA AND LATERAL: ICD-10-PCS | Mod: 26,HCNC,, | Performed by: RADIOLOGY

## 2020-02-04 PROCEDURE — 1125F AMNT PAIN NOTED PAIN PRSNT: CPT | Mod: HCNC,S$GLB,, | Performed by: INTERNAL MEDICINE

## 2020-02-04 PROCEDURE — 3078F PR MOST RECENT DIASTOLIC BLOOD PRESSURE < 80 MM HG: ICD-10-PCS | Mod: HCNC,CPTII,S$GLB, | Performed by: INTERNAL MEDICINE

## 2020-02-04 PROCEDURE — 1159F PR MEDICATION LIST DOCUMENTED IN MEDICAL RECORD: ICD-10-PCS | Mod: HCNC,S$GLB,, | Performed by: INTERNAL MEDICINE

## 2020-02-04 PROCEDURE — 1101F PR PT FALLS ASSESS DOC 0-1 FALLS W/OUT INJ PAST YR: ICD-10-PCS | Mod: HCNC,CPTII,S$GLB, | Performed by: INTERNAL MEDICINE

## 2020-02-04 PROCEDURE — 1125F PR PAIN SEVERITY QUANTIFIED, PAIN PRESENT: ICD-10-PCS | Mod: HCNC,S$GLB,, | Performed by: INTERNAL MEDICINE

## 2020-02-04 PROCEDURE — 3077F PR MOST RECENT SYSTOLIC BLOOD PRESSURE >= 140 MM HG: ICD-10-PCS | Mod: HCNC,CPTII,S$GLB, | Performed by: INTERNAL MEDICINE

## 2020-02-04 PROCEDURE — 99999 PR PBB SHADOW E&M-EST. PATIENT-LVL III: ICD-10-PCS | Mod: PBBFAC,HCNC,, | Performed by: INTERNAL MEDICINE

## 2020-02-04 PROCEDURE — 71046 X-RAY EXAM CHEST 2 VIEWS: CPT | Mod: 26,HCNC,, | Performed by: RADIOLOGY

## 2020-02-04 RX ORDER — MECLIZINE HCL 12.5 MG 12.5 MG/1
12.5 TABLET ORAL 3 TIMES DAILY PRN
Qty: 15 TABLET | Refills: 0 | Status: SHIPPED | OUTPATIENT
Start: 2020-02-04 | End: 2020-07-27

## 2020-02-05 ENCOUNTER — OFFICE VISIT (OUTPATIENT)
Dept: NEPHROLOGY | Facility: CLINIC | Age: 80
End: 2020-02-05
Payer: MEDICARE

## 2020-02-05 VITALS
DIASTOLIC BLOOD PRESSURE: 80 MMHG | HEART RATE: 99 BPM | BODY MASS INDEX: 26.99 KG/M2 | OXYGEN SATURATION: 93 % | SYSTOLIC BLOOD PRESSURE: 160 MMHG | HEIGHT: 63 IN | WEIGHT: 152.31 LBS

## 2020-02-05 DIAGNOSIS — N18.30 TYPE 2 DM WITH CKD STAGE 3 AND HYPERTENSION: Primary | ICD-10-CM

## 2020-02-05 DIAGNOSIS — I42.8 NONISCHEMIC CARDIOMYOPATHY: ICD-10-CM

## 2020-02-05 DIAGNOSIS — R80.9 PROTEINURIA, UNSPECIFIED TYPE: ICD-10-CM

## 2020-02-05 DIAGNOSIS — E11.22 TYPE 2 DM WITH CKD STAGE 3 AND HYPERTENSION: Primary | ICD-10-CM

## 2020-02-05 DIAGNOSIS — I12.9 TYPE 2 DM WITH CKD STAGE 3 AND HYPERTENSION: Primary | ICD-10-CM

## 2020-02-05 PROCEDURE — 99213 OFFICE O/P EST LOW 20 MIN: CPT | Mod: HCNC,S$GLB,, | Performed by: INTERNAL MEDICINE

## 2020-02-05 PROCEDURE — 1101F PR PT FALLS ASSESS DOC 0-1 FALLS W/OUT INJ PAST YR: ICD-10-PCS | Mod: HCNC,CPTII,S$GLB, | Performed by: INTERNAL MEDICINE

## 2020-02-05 PROCEDURE — 1126F AMNT PAIN NOTED NONE PRSNT: CPT | Mod: HCNC,S$GLB,, | Performed by: INTERNAL MEDICINE

## 2020-02-05 PROCEDURE — 1159F MED LIST DOCD IN RCRD: CPT | Mod: HCNC,S$GLB,, | Performed by: INTERNAL MEDICINE

## 2020-02-05 PROCEDURE — 3077F SYST BP >= 140 MM HG: CPT | Mod: HCNC,CPTII,S$GLB, | Performed by: INTERNAL MEDICINE

## 2020-02-05 PROCEDURE — 3079F DIAST BP 80-89 MM HG: CPT | Mod: HCNC,CPTII,S$GLB, | Performed by: INTERNAL MEDICINE

## 2020-02-05 PROCEDURE — 99999 PR PBB SHADOW E&M-EST. PATIENT-LVL III: CPT | Mod: PBBFAC,HCNC,, | Performed by: INTERNAL MEDICINE

## 2020-02-05 PROCEDURE — 1126F PR PAIN SEVERITY QUANTIFIED, NO PAIN PRESENT: ICD-10-PCS | Mod: HCNC,S$GLB,, | Performed by: INTERNAL MEDICINE

## 2020-02-05 PROCEDURE — 3077F PR MOST RECENT SYSTOLIC BLOOD PRESSURE >= 140 MM HG: ICD-10-PCS | Mod: HCNC,CPTII,S$GLB, | Performed by: INTERNAL MEDICINE

## 2020-02-05 PROCEDURE — 1101F PT FALLS ASSESS-DOCD LE1/YR: CPT | Mod: HCNC,CPTII,S$GLB, | Performed by: INTERNAL MEDICINE

## 2020-02-05 PROCEDURE — 99999 PR PBB SHADOW E&M-EST. PATIENT-LVL III: ICD-10-PCS | Mod: PBBFAC,HCNC,, | Performed by: INTERNAL MEDICINE

## 2020-02-05 PROCEDURE — 1159F PR MEDICATION LIST DOCUMENTED IN MEDICAL RECORD: ICD-10-PCS | Mod: HCNC,S$GLB,, | Performed by: INTERNAL MEDICINE

## 2020-02-05 PROCEDURE — 99213 PR OFFICE/OUTPT VISIT, EST, LEVL III, 20-29 MIN: ICD-10-PCS | Mod: HCNC,S$GLB,, | Performed by: INTERNAL MEDICINE

## 2020-02-05 PROCEDURE — 3079F PR MOST RECENT DIASTOLIC BLOOD PRESSURE 80-89 MM HG: ICD-10-PCS | Mod: HCNC,CPTII,S$GLB, | Performed by: INTERNAL MEDICINE

## 2020-02-05 NOTE — PROGRESS NOTES
NEPHROLOGY PROGRESS NOTE    Subjective:       Patient ID: Myesha Quinones is a 79 y.o. White female who presents for follow up evaluation of renal function.     HPI   77 yo white female with a PMH of HTN, DM, nonischemic cardiomyopathy (AICD in place), HFpEF (50%), COPD, multinodular goiter, CKD3 baseline crt has historically been about 1.2-1.4.     She arrives today for follow up. The patient denies taking NSAIDs or new antibiotics, recreational drugs, recent episode of dehydration, diarrhea, nausea or vomiting, or exposure to IV radiocontrast.     Recently hospitalized twice for pneumonia.      Review of Systems   Constitutional: Negative for appetite change, chills, fatigue, fever and unexpected weight change.   HENT: Negative for congestion, facial swelling, hearing loss, nosebleeds and trouble swallowing.    Eyes: Negative for pain, discharge, redness and visual disturbance.   Respiratory: Negative for cough, chest tightness, shortness of breath and wheezing.    Cardiovascular: Negative for chest pain, palpitations and leg swelling.   Gastrointestinal: Negative for abdominal pain, constipation, diarrhea, nausea and vomiting.   Endocrine: Negative for cold intolerance, heat intolerance and polydipsia.   Genitourinary: Negative for decreased urine volume, difficulty urinating, dysuria, flank pain, hematuria and urgency.   Musculoskeletal: Negative for arthralgias, back pain, joint swelling and myalgias.   Skin: Negative for color change, pallor, rash and wound.   Neurological: Negative for dizziness, tremors, seizures, syncope, speech difficulty, weakness and headaches.   Hematological: Negative for adenopathy. Does not bruise/bleed easily.   Psychiatric/Behavioral: Negative for agitation, behavioral problems, dysphoric mood, self-injury and sleep disturbance.       Objective:     /80 mmHg  Physical Exam   Constitutional: She is oriented to person, place, and time. She appears well-developed and  well-nourished. No distress.   HENT:   Head: Normocephalic and atraumatic.   Mouth/Throat: No oropharyngeal exudate.   Eyes: Pupils are equal, round, and reactive to light. Conjunctivae and EOM are normal. Right eye exhibits no discharge. Left eye exhibits no discharge. No scleral icterus.   Neck: Normal range of motion. Neck supple. No JVD present. No tracheal deviation present. No thyromegaly present.   Cardiovascular: Normal rate, regular rhythm, normal heart sounds and intact distal pulses. Exam reveals no gallop.   No murmur heard.  minimal LE edema bilateral    Pulmonary/Chest: Effort normal and breath sounds normal. No stridor. No respiratory distress. She has no wheezes. She has no rales. She exhibits no tenderness.   Abdominal: Soft. Bowel sounds are normal. She exhibits no distension and no mass. There is no tenderness. There is no rebound and no guarding. No hernia.   Musculoskeletal: She exhibits no edema or tenderness.   Lymphadenopathy:     She has no cervical adenopathy.   Neurological: She is alert and oriented to person, place, and time. She exhibits normal muscle tone.   Skin: Skin is warm and dry. No rash noted. She is not diaphoretic. No erythema.   Psychiatric: She has a normal mood and affect. Judgment and thought content normal.   Nursing note and vitals reviewed.      LABS  Serum Cr 1.4 mg/dL  UPCR 1.8 g/g    Assessment:       1. Type 2 DM with CKD stage 3 and hypertension. 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 Proteinuria course coincided with worsening glycemia. Already on an ARB. Not progressing, risk for ESRD only mild to moderate. Avoiding NSAIDs.    2. Proteinuria, unspecified type. Claims white coat HTN. SBP at home 120 mmHg. Continue irbesartan, chlorthalidone, carvedilol, clonidine patch, diltiazem. Reports lightheadedness after taking BP meds.        Plan:       1. Continue irbesartan and chlorthalidone and other BP meds  2. Tight glycemic control, home BP  monitoring  3. Avoid NSAIDs  4. RTC in 6 mo

## 2020-02-06 ENCOUNTER — TELEPHONE (OUTPATIENT)
Dept: ADMINISTRATIVE | Facility: HOSPITAL | Age: 80
End: 2020-02-06

## 2020-02-06 ENCOUNTER — PATIENT OUTREACH (OUTPATIENT)
Dept: ADMINISTRATIVE | Facility: HOSPITAL | Age: 80
End: 2020-02-06

## 2020-02-06 NOTE — PROGRESS NOTES
Subjective:      Patient ID: Myesha Quinones is a 79 y.o. female.    Chief Complaint: Dizziness and Extremity Weakness    HPI:  HPI   The patient called for an urgent care appointment this morning.    She states that approximately last Wednesday she went to her dentist.  When she got up from the chair she noted some acute dizziness.  It improved over the next day only to worsen again.  On the initial episode of dizziness she did note the room spinning.  She does have decreased hearing and wears hearing aids.  She has tried no medications for this.    The patient had a community-acquired pneumonia/hospital-acquired pneumonia under the previous definitions.  Subsequent to this she had a large right pleural effusion.  It was drained outpatient and negative for any significant abnormalities fungal cultures are still pending.  She is concerned about the level of the pleural effusion today.  She has significant underlying lung disease and her pulse ox is 95%.    The patient is a diabetic with hypertension coronary artery disease.  She does have obstructive sleep apnea and chronic kidney disease.  Patient Active Problem List   Diagnosis    History of nonmelanoma skin cancer    Nonischemic cardiomyopathy    LBBB (left bundle branch block)    Nontoxic multinodular goiter    Meningioma    Asthma, chronic    Biventricular ICD (implantable cardioverter-defibrillator) in place    Tremor    Coronary artery disease involving native coronary artery without angina pectoris - Non-obstructive 50% LAD    Essential hypertension    Hyperlipidemia    History of breast cancer    Adrenal cortical nodule: repeat CT 6/2016    Calcification of aorta    Diabetic polyneuropathy associated with type 2 diabetes mellitus    Osteoporosis    KHOA (obstructive sleep apnea)    Type 2 diabetes mellitus with stage 3 chronic kidney disease, with long-term current use of insulin    Chronic kidney disease, stage 3, mod decreased GFR     Iron deficiency anemia    Chemotherapy-induced neuropathy    Hypomagnesemia    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, with long-term current use of insulin    Hypertensive retinopathy, bilateral    Multiple lung nodules    COPD (chronic obstructive pulmonary disease)    Mixed conductive and sensorineural hearing loss    ICD (implantable cardioverter-defibrillator) battery depletion    Type 2 DM with CKD stage 3 and hypertension    Cardiomyopathy, ischemic    Metabolic bone disease    Proteinuria    Seasonal allergies    Pseudomonas pneumonia    Rib fracture    Parapneumonic effusion     Past Medical History:   Diagnosis Date    Acute hypoxemic respiratory failure 12/19/2019    Acute right-sided thoracic back pain 12/9/2019    Allergy     Asthma     Basal cell carcinoma     left forehead    Basal cell carcinoma     left nose    Basal cell carcinoma 05/20/2015    right nose    Basal cell carcinoma 12/22/2015    left lower post neck    Breast cancer     CAD (coronary artery disease)     Cardiomyopathy     Cardiomyopathy, ischemic     Cataract     CHF (congestive heart failure)     Chronic kidney disease, stage 3, mod decreased GFR 2/14/2017    Colon polyp 2011    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, with long-term current use of insulin 2/22/2018    COPD (chronic obstructive pulmonary disease)     COPD exacerbation 4/8/2018    Defibrillator discharge     Diabetes mellitus     Diabetes mellitus type II     Diabetes with neurologic complications     Goiter     MNG    HX: breast cancer     Hyperlipidemia     Hypertension     Iron deficiency anemia 5/16/2017    Left kidney mass     Meningioma     Osteoporosis, postmenopausal     Pacemaker     Pneumonia 12/8/2019    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     Pseudomonas pneumonia     Skin cancer     s/p excision    Sleep apnea     CPAP    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/2015    mid forehead    Unspecified vitamin D deficiency     Ventricular tachycardia     Vitamin B12 deficiency     Vitamin D deficiency disease      Past Surgical History:   Procedure Laterality Date    BASAL CELL CARCINOMA EXCISION      posterior neck and nose    BREAST BIOPSY      BREAST CYST EXCISION Left     BREAST SURGERY      CARDIAC DEFIBRILLATOR PLACEMENT      x 2    CATARACT EXTRACTION W/  INTRAOCULAR LENS IMPLANT Bilateral     CHOLECYSTECTOMY      COLONOSCOPY N/A 11/5/2019    Procedure: COLONOSCOPY;  Surgeon: Boaz Botello MD;  Location: [facility] ENDO ([address] [place] [address]);  Service: Endoscopy;  Laterality: N/A;  AICD - [employer] -     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    FRACTURE SURGERY      left elbow and wrist as a child    HYSTERECTOMY      MASTECTOMY Right     REPLACEMENT OF IMPLANTABLE CARDIOVERTER-DEFIBRILLATOR (ICD) GENERATOR N/A 12/17/2018    Procedure: REPLACEMENT, ICD GENERATOR;  Surgeon: Jan Mckeon MD;  Location: [facility] EP LAB;  Service: Cardiology;  Laterality: N/A;  DONNA, CRT-D gen change, MDT, MAC, SK, 3 Prep    REVISION OF SKIN POCKET FOR CARDIOVERTER-DEFIBRILLATOR  12/17/2018    Procedure: Revision, Skin Pocket, For Cardioverter-Defibrillator;  Surgeon: Jan Mckeon MD;  Location: [facility] EP LAB;  Service: Cardiology;;    SQUAMOUS CELL CARCINOMA EXCISION      remved from forehead    TONSILLECTOMY       Family History   Problem Relation Age of Onset    Diabetes Father     Heart disease Father     Diabetes Sister     Heart disease Sister     Diabetes Brother     Heart disease Brother     Hypertension Brother     Diabetes Brother     Heart disease Brother     Hypertension Brother     Diabetes Brother     Heart disease Brother     Cancer Brother         colon    Diabetes Brother     Cancer Son         skin    Diabetes Son         prediabetes     "Diabetes Daughter         prediabetes    Cancer Daughter         melanoma    Obesity Daughter     Melanoma Daughter     Diabetes Son     Asthma Mother     Hypertension Mother     Stroke Mother     No Known Problems Maternal Grandmother     No Known Problems Maternal Grandfather     No Known Problems Paternal Grandmother     No Known Problems Paternal Grandfather     Amblyopia Neg Hx     Blindness Neg Hx     Cataracts Neg Hx     Glaucoma Neg Hx     Macular degeneration Neg Hx     Retinal detachment Neg Hx     Strabismus Neg Hx     Thyroid disease Neg Hx      Review of Systems   Constitutional: Negative for chills, fever and unexpected weight change.   HENT: Negative for trouble swallowing.    Respiratory: Positive for cough and shortness of breath. Negative for wheezing.    Cardiovascular: Negative for chest pain and palpitations.   Gastrointestinal: Negative for abdominal distention, abdominal pain, blood in stool and vomiting.   Musculoskeletal: Negative for back pain.     Objective:     Vitals:    02/04/20 1313   BP: (!) 160/62   Pulse: 87   Temp: 98 °F (36.7 °C)   SpO2: 95%   Height: 5' 3" (1.6 m)   PainSc:   8     Body mass index is 26.91 kg/m².  Physical Exam  Assessment:     1. Pleural effusion    2. Dizziness      Plan:   Myesha was seen today for dizziness and extremity weakness.    Diagnoses and all orders for this visit:    Pleural effusion  Comments:  Will recheck chest x-ray.  Pulse ox is good.  There was no evidence of malignancy and pleural effusions should resolve.  Orders:  -     X-Ray Chest PA And Lateral; Future    Dizziness  Comments:  This is likely multifactorial but triggered by the positional changes.  It may take some time to pass.  A trial of meclizine, not suspected stroke    Other orders  -     meclizine (ANTIVERT) 12.5 mg tablet; Take 1 tablet (12.5 mg total) by mouth 3 (three) times daily as needed for dizziness        Problem List Items Addressed This Visit     " None      Visit Diagnoses     Pleural effusion    -  Primary    Will recheck chest x-ray.  Pulse ox is good.  There was no evidence of malignancy and pleural effusions should resolve.    Relevant Orders    X-Ray Chest PA And Lateral (Completed)    Dizziness        This is likely multifactorial but triggered by the positional changes.  It may take some time to pass.  A trial of meclizine, not suspected stroke        Orders Placed This Encounter   Procedures    X-Ray Chest PA And Lateral     Standing Status:   Future     Number of Occurrences:   1     Standing Expiration Date:   4/4/2020     Follow up if symptoms worsen or fail to improve.     Medication List           Accurate as of February 4, 2020 11:59 PM. If you have any questions, ask your nurse or doctor.               START taking these medications    meclizine 12.5 mg tablet  Commonly known as:  ANTIVERT  Take 1 tablet (12.5 mg total) by mouth 3 (three) times daily as needed for dizziness  Started by:  Emelina Gaston MD        CHANGE how you take these medications    blood sugar diagnostic Strp  Commonly known as:  Accu-Chek Angelica  Uses Accu-Check Angelica meter to test BG 4x/day  What changed:  additional instructions     insulin glargine 100 units/mL (3mL) SubQ pen  Commonly known as:  Lantus Solostar U-100 Insulin  Inject 15 Units into the skin every evening.  What changed:  how much to take     lancets Misc  Commonly known as:  Accu-Chek Softclix Lancets  Uses Accu-Chek Angelica meter to test BG 4x/day  What changed:  additional instructions        CONTINUE taking these medications    acetaminophen 500 MG tablet  Commonly known as:  TYLENOL     B-12 DOTS ORAL     benzonatate 100 MG capsule  Commonly known as:  Tesaustin Chowdhuryes  Take 2 capsules (200 mg total) by mouth 3 (three) times daily as needed for Cough.     carvediloL 25 MG tablet  Commonly known as:  COREG  Take 1 tablet (25 mg total) by mouth 2 (two) times daily with meals.     chlorthalidone 25 MG  "Tab  Commonly known as:  HYGROTEN  Take 1 tablet (25 mg total) by mouth once daily.     cloNIDine 0.1 mg/24 hr td ptwk 0.1 mg/24 hr  Commonly known as:  CATAPRES  Place 1 patch onto the skin every 7 days. (Saturdays)     diltiaZEM 120 MG Cp24  Commonly known as:  CARDIZEM CD  Take 1 capsule (120 mg total) by mouth once daily.     Enteric Coated Aspirin 81 MG EC tablet  Generic drug:  aspirin     Fish Oil 500 mg Cap  Generic drug:  omega-3 fatty acids     fluticasone propionate 50 mcg/actuation nasal spray  Commonly known as:  FLONASE  2 sprays (100 mcg total) by Each Nare route once daily.     hydrALAZINE 100 MG tablet  Commonly known as:  APRESOLINE  Take 1 tablet (100 mg total) by mouth 3 (three) times daily.     irbesartan 300 MG tablet  Commonly known as:  AVAPRO  Take 1 tablet (300 mg total) by mouth every evening.     levocetirizine 5 MG tablet  Commonly known as:  XYZAL  Take 1 tablet (5 mg total) by mouth every evening.     lidocaine 5 %  Commonly known as:  LIDODERM  Place 1 patch onto the skin once daily. Place patch to right back. Leave on for 12 hours and remove for 12 hours.     linaGLIPtin 5 mg Tab tablet  Commonly known as:  Tradjenta  Take 1 tablet (5 mg total) by mouth once daily.     magnesium oxide 400 mg (241.3 mg magnesium) tablet  Commonly known as:  MAG-OX  Take 1 tablet (400 mg total) by mouth once daily.     metFORMIN 500 MG tablet  Commonly known as:  GLUCOPHAGE  Take 1 tablet (500 mg total) by mouth 2 (two) times daily with meals.     nitroGLYCERIN 0.4 MG SL tablet  Commonly known as:  NITROSTAT     ondansetron 4 MG Tbdl  Commonly known as:  ZOFRAN-ODT  Take 1 tablet (4 mg total) by mouth every 6 (six) hours as needed.     pen needle, diabetic 31 gauge x 3/16" Ndle  Commonly known as:  BD Ultra-Fine Mini Pen Needle  USE WITH LANTUS AT BEDTIME     pravastatin 40 MG tablet  Commonly known as:  PRAVACHOL  Take 1 tablet (40 mg total) by mouth once daily.     traMADol 50 mg tablet  Commonly " known as:  ULTRAM  Take 1 tablet (50 mg total) by mouth nightly as needed for Pain. New right thoracic apin     Ventolin HFA 90 mcg/actuation inhaler  Generic drug:  albuterol  INHALE 2 PUFFS INTO THE LUNGS EVERY 6 (SIX) HOURS AS NEEDED FOR WHEEZING. RESCUE     Vitamin D3 2,000 unit Tab  Generic drug:  cholecalciferol (vitamin D3)     Wixela Inhub 250-50 mcg/dose diskus inhaler  Generic drug:  fluticasone-salmeterol 250-50 mcg/dose  Inhale 1 puff into the lungs 2 (two) times daily. This is a change from one month           Where to Get Your Medications      These medications were sent to [facility]  [address] [place] [id number]    Hours:  Mon-Fri, 8a-5:30p Phone:  [phone]   · meclizine 12.5 mg tablet

## 2020-02-06 NOTE — PROGRESS NOTES
Chart Reviewed - Vaccines Updated - Foot Exam Updated - Next Podiatry Exam Scheduled 3/2020 - Check B/P

## 2020-02-07 RX ORDER — CHLORTHALIDONE 25 MG/1
TABLET ORAL
Qty: 90 TABLET | Refills: 1 | Status: SHIPPED | OUTPATIENT
Start: 2020-02-07 | End: 2020-02-18 | Stop reason: SDUPTHER

## 2020-02-12 ENCOUNTER — TELEPHONE (OUTPATIENT)
Dept: DERMATOLOGY | Facility: CLINIC | Age: 80
End: 2020-02-12

## 2020-02-12 NOTE — TELEPHONE ENCOUNTER
Pt had mohs surgery on 1/9 on L anterior scalp for BCC with CLC. Pt c/o the area not healing. Medial portion of incision dehisced and advised to keep area moist and covered. To make sure it does not form a scab. If area does not improve in a week to call us back and we will see her in clinic. Pt expressed verbal understanding and appreciated the call.

## 2020-02-12 NOTE — TELEPHONE ENCOUNTER
----- Message from Marcello Mcgee sent at 2/12/2020  2:10 PM CST -----  Contact: pt  Pt incision is still open from 01/09 and is calling for advice . Please give pt a call back at [phone] .

## 2020-02-13 ENCOUNTER — LAB VISIT (OUTPATIENT)
Dept: LAB | Facility: HOSPITAL | Age: 80
End: 2020-02-13
Attending: NURSE PRACTITIONER
Payer: MEDICARE

## 2020-02-13 DIAGNOSIS — E11.42 DIABETIC POLYNEUROPATHY ASSOCIATED WITH TYPE 2 DIABETES MELLITUS: ICD-10-CM

## 2020-02-13 LAB
ALBUMIN SERPL BCP-MCNC: 3.4 G/DL (ref 3.5–5.2)
ALP SERPL-CCNC: 80 U/L (ref 55–135)
ALT SERPL W/O P-5'-P-CCNC: 19 U/L (ref 10–44)
ANION GAP SERPL CALC-SCNC: 8 MMOL/L (ref 8–16)
AST SERPL-CCNC: 23 U/L (ref 10–40)
BILIRUB SERPL-MCNC: 0.2 MG/DL (ref 0.1–1)
BUN SERPL-MCNC: 52 MG/DL (ref 8–23)
CALCIUM SERPL-MCNC: 9.9 MG/DL (ref 8.7–10.5)
CHLORIDE SERPL-SCNC: 103 MMOL/L (ref 95–110)
CO2 SERPL-SCNC: 23 MMOL/L (ref 23–29)
CREAT SERPL-MCNC: 1.6 MG/DL (ref 0.5–1.4)
EST. GFR  (AFRICAN AMERICAN): 34.8 ML/MIN/1.73 M^2
EST. GFR  (NON AFRICAN AMERICAN): 30.2 ML/MIN/1.73 M^2
GLUCOSE SERPL-MCNC: 81 MG/DL (ref 70–110)
POTASSIUM SERPL-SCNC: 4.4 MMOL/L (ref 3.5–5.1)
PROT SERPL-MCNC: 7.3 G/DL (ref 6–8.4)
SODIUM SERPL-SCNC: 134 MMOL/L (ref 136–145)

## 2020-02-13 PROCEDURE — 80053 COMPREHEN METABOLIC PANEL: CPT | Mod: HCNC

## 2020-02-13 PROCEDURE — 36415 COLL VENOUS BLD VENIPUNCTURE: CPT | Mod: HCNC,PO

## 2020-02-13 PROCEDURE — 83036 HEMOGLOBIN GLYCOSYLATED A1C: CPT | Mod: HCNC

## 2020-02-14 ENCOUNTER — PATIENT OUTREACH (OUTPATIENT)
Dept: OTHER | Facility: OTHER | Age: 80
End: 2020-02-14

## 2020-02-14 DIAGNOSIS — I10 ESSENTIAL HYPERTENSION: Primary | ICD-10-CM

## 2020-02-14 LAB
ESTIMATED AVG GLUCOSE: 157 MG/DL (ref 68–131)
HBA1C MFR BLD HPLC: 7.1 % (ref 4–5.6)

## 2020-02-14 NOTE — PROGRESS NOTES
02/14/2020 2:12 PM Called patient and left voicemail with call back number. Will follow up with patient at next encounter. Called to review most recent BMP. eGFR is 30 mL/min and continues to decline. Would like to discontinue chlorhtalidone at this time

## 2020-02-18 ENCOUNTER — CLINICAL SUPPORT (OUTPATIENT)
Dept: CARDIOLOGY | Facility: HOSPITAL | Age: 80
End: 2020-02-18
Attending: INTERNAL MEDICINE
Payer: MEDICARE

## 2020-02-18 DIAGNOSIS — Z95.810 AICD (AUTOMATIC CARDIOVERTER/DEFIBRILLATOR) PRESENT: ICD-10-CM

## 2020-02-18 PROCEDURE — 93296 REM INTERROG EVL PM/IDS: CPT | Mod: HCNC | Performed by: INTERNAL MEDICINE

## 2020-02-18 PROCEDURE — 93295 DEV INTERROG REMOTE 1/2/MLT: CPT | Mod: ,,, | Performed by: INTERNAL MEDICINE

## 2020-02-18 PROCEDURE — 93295 CARDIAC DEVICE CHECK - REMOTE: ICD-10-PCS | Mod: ,,, | Performed by: INTERNAL MEDICINE

## 2020-02-18 RX ORDER — CHLORTHALIDONE 25 MG/1
12.5 TABLET ORAL DAILY
Qty: 90 TABLET | Refills: 1
Start: 2020-02-18 | End: 2020-07-23 | Stop reason: SDUPTHER

## 2020-02-18 NOTE — PROGRESS NOTES
Subjective:      Patient ID: Myesha Quinones is a 79 y.o. female.    Chief Complaint:  Diabetes    History of Present Illness  Myesha Quinones presents today for follow up of type 2 DM.     With regards to the diabetes:     Current symptoms/problems include none. Her metformin dose was recently decreased from [phone] down to 500 twice daily with breakfast and supper.      The patient was initially diagnosed with Type 2 diabetes mellitus:  1992     Known diabetic complications: CKD and retinopathy  Cardiovascular risk factors: advanced age (older than 55 for men, 65 for women), diabetes mellitus and dyslipidemia    Current diabetic medications include:  Lantus 34 units nightly  Tradjenta 5 mg daily  Metformin 500 mg BID- decreased for kidney function      Prior visit with dietician: yes  Current diet: Was doing well with her diet, but admits she had some indiscretions during the holidays.     Current monitoring regimen: home blood tests - once daily times daily  Oral recall: 108-130 highest     Any episodes of hypoglycemia? Denies  Knows how to correct with 15 grams of carbs- juice, coke, or glucose tablets.    Diabetes Management Status  Statin: Taking  ACE/ARB: Taking  Screening or Prevention Patient's value Goal Complete/Controlled?   HgA1C Testing and Control   Lab Results   Component Value Date    HGBA1C 7.1 (H) 02/13/2020    Annually/Less than 8% No   Lipid profile : 01/17/2020 Annually Yes   LDL control Lab Results   Component Value Date    LDLCALC 58.6 (L) 01/17/2020    Annually/Less than 100 mg/dl  Yes   Nephropathy screening Lab Results   Component Value Date    LABMICR 335.0 01/21/2019     Lab Results   Component Value Date    PROTEINUA 2+ (A) 01/17/2020    Annually Yes   Blood pressure BP Readings from Last 1 Encounters:   02/20/20 (!) 144/68    Less than 140/90 No   Dilated retinal exam : 01/02/2020 Annually Yes   Foot exam   : 11/06/2019 Annually Yes     With regards to the thyroid  nodule:  Presents with nodule that was diagnosed by ultrasound     Preexisting thyroid disease?: no     Compressiveymptoms:  Anterior neck pressure?: no  Dysphagia?: no  Voice changes?: no     Risk Factors:  Radiation to head or neck for any treatment of cancer or exposure to radiation?: no  Personal history of colon or breast cancer?: no  Tobacco use?: no  Family history of thyroid cancer?: no     Symptoms of hyper or hypothyroidism:  None     Previous biopsy results:  Left nodule: Benign - 2016  Right nodule: Benign - 2014     Last ultrasound: 9/2019  FINDINGS:  Thyroid gland has heterogeneous echotexture and normal vascularity.    RIGHT LOBE:  Right lobe of the thyroid measures 5.11 x 1.94 x 1.85 cm.    1.35 x 1.28 x 1.30 cm solid, heterogeneous predominately hypoechoic nodule is seen in the right inferior pole.  This nodule has a spongiform appearance.  Margins are well-defined.  No microcalcifications are seen.  Vascularity is grade 1.  1.45 x 1.40 x 1.35 cm solid, isoechoic nodule is seen in the right mid lobe.  Margins are well-defined.  No microcalcifications are seen.  Vascularity is grade 2.    LEFT LOBE:  Left lobe of the thyroid measures 4.95 x 2.45 x 1.55 cm.  1.22 x 0.83 x 1.02 cm solid, heterogeneous predominately isoechoic nodule is seen in the left superior pole.  Margins are blurred.  No microcalcifications are seen.  Vascularity is grade 1.  1.89 x 2.03 x 1.21 cm solid, heterogeneous nodule is seen in the left mid lobe.  Margins are blurred.  This nodule contains multiple macrocystic areas.  No microcalcifications are seen.  Vascularity is grade 1.    ISTHMUS:  Isthmus measures 0.43 cm.    LYMPH NODES:  No abnormal lymph nodes seen.    COMPARISON:  Neck ultrasound dated 05/01/2018.  Our records indicate benign FNA's of the right and left lobe nodules on 4/2014 and 6/2016 respectively.  When compared to the prior study the above nodules have not changed significantly in size or features.     "  Impression     1.)  Thyroid gland is upper limits of normal in size with heterogeneous echotexture and normal vascularity  2.) 1.35 x 1.28 x 1.30 cm solid, heterogeneous predominately hypoechoic nodule is seen in the right inferior pole  3.) 1.45 x 1.40 x 1.35 cm solid, isoechoic nodule is seen in the right mid lobe  4.) 1.22 x 0.83 x 1.02 cm solid, heterogeneous predominately isoechoic nodule is seen in the left superior pole  5.) 1.89 x 2.03 x 1.21 cm solid, heterogeneous nodule is seen in the left mid lobe  RECOMMENDATIONS:  Recommend repeat thyroid ultrasound in 1-2 years.      With regards to the adrenal incidentaloma:     Was found to have bilateral adrenal nodules found incidentally on CT scan "many" years ago. She reports she was initially having yearly CT scans to follow them, but it was recommended to stop doing them due to lack of growth. There are two CT scans in our system (2016 and 2017). The left nodule increased from 1.7 > 2.0 cm and measures -5.7 HU pre-contrast. The right nodule was the same size in the interval with indeterminate density approx. 20 HU.     She thinks a functional workup was done around the time they were discovered, but I don't have those records.     Pt reports no abdominal discomfort.  No h/o hypertensive emergency. Blood pressure hasn't been difficult to control recently.  Pt denies h/o paroxysmal symptoms such as syncope, pallor, dizziness, palpitations, headaches.      No new HTN or worsening of known HTN   She has diabetes, but has not worsened recently.  Denies polyuria, polydipsia, nocturia, unexplained weight loss or blurred vision     No easy bruisability or abnormal stretch marks. No muscle weakness     With regards to osteoporosis:      Past medication: Alendronate - did not tolerate due to bone pain.  She was also offered prolia, but she read about the side-effects and is not interested in starting it or any other therapy for osteoporosis.     Calcium intake?  1 " serving of dairy per day  Vit D intake?  2000 IU a day   Weight bearing exercise?   no  Recent falls? no  Fall Precautions? no     Recent fractures? no  Height loss (>2 inches)? no     Tobacco use ?  no  EtOH use? nono     Current diarrhea or h/o malabsorption? no  Chronic steroids? Inhaled only  Anticoagulants? no  Proton Pump Inhibitors? no  Antiseizure medications? no   Thyroid disease? no  Anemia? yes  Kidney Stones? no  Hyperparathyroidism? no     Malignancy involving bone (active or history)? no  Prior radiation treatment? no  Unexplained elevation of alkaline phosphatase? no  Dental work planned? no     Last BMD: 4/2018:  Lumbar Spine: BMD 0.866g/cm2: T-score of -1.6.    Total Hip: BMD 0.817g/cm2. T-score is -1.0, and the Z-score is 1.0.    Femoral neck: Bone mineral density is 0.611g/cm2 and the T-score is -2.1 and the Z-score is 0.1 g/cm2.    There is a 16% risk of a major osteoporotic fracture and a 4.7% risk of hip fracture in the next 10 years (FRAX).    Compared with previous DXA, BMD at the lumbar spine has remained stable, and the BMD at the total hip has declined 4.5%.     Review of Systems   Constitutional: Negative for fatigue.   Eyes: Negative for visual disturbance.   Respiratory: Negative for shortness of breath.    Cardiovascular: Negative for chest pain.   Gastrointestinal: Negative for abdominal pain.   Musculoskeletal: Negative for arthralgias.   Skin: Negative for wound.   Neurological: Negative for headaches.   Hematological: Does not bruise/bleed easily.   Psychiatric/Behavioral: Negative for sleep disturbance.     Objective:   Physical Exam   Constitutional: She appears well-developed.   Neck: Thyromegaly present.   Cardiovascular: Normal rate.   Pulmonary/Chest: Effort normal.   Abdominal: Soft.   Vitals reviewed.  Appropriate footwear, Foot exam deferred, done 1/2019.    No ulcers or wounds.   injection sites are ok. No lipo hypertropthy or atrophy    Visit Vitals  BP (!) 144/68  "  Pulse 77   Ht 5' 3" (1.6 m)   Wt 70.9 kg (156 lb 6.7 oz)   LMP  (LMP Unknown)   BMI 27.71 kg/m²      Lab Review:   Lab Results   Component Value Date    HGBA1C 7.1 (H) 02/13/2020     Lab Results   Component Value Date    CHOL 131 01/17/2020    HDL 42 01/17/2020    LDLCALC 58.6 (L) 01/17/2020    TRIG 152 (H) 01/17/2020    CHOLHDL 32.1 01/17/2020     Lab Results   Component Value Date     (L) 02/13/2020    K 4.4 02/13/2020     02/13/2020    CO2 23 02/13/2020    GLU 81 02/13/2020    BUN 52 (H) 02/13/2020    CREATININE 1.6 (H) 02/13/2020    CALCIUM 9.9 02/13/2020    PROT 7.3 02/13/2020    ALBUMIN 3.4 (L) 02/13/2020    BILITOT 0.2 02/13/2020    ALKPHOS 80 02/13/2020    AST 23 02/13/2020    ALT 19 02/13/2020    ANIONGAP 8 02/13/2020    ESTGFRAFRICA 34.8 (A) 02/13/2020    EGFRNONAA 30.2 (A) 02/13/2020    TSH 0.447 01/17/2020     Vit D, 25-Hydroxy   Date Value Ref Range Status   01/17/2020 35 30 - 96 ng/mL Final     Comment:     Vitamin D deficiency.........<10 ng/mL                              Vitamin D insufficiency......10-29 ng/mL       Vitamin D sufficiency........> or equal to 30 ng/mL  Vitamin D toxicity............>100 ng/mL       Assessment and Plan     1. 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 See above regarding diabetes.     Type 2 diabetes mellitus with stage 3 chronic kidney disease, with long-term current use of insulin  -- A1c has improved since last visit closer to goal of 8%, " without hypoglycemia   Reviewed goals of therapy are to get the best control we can without hypoglycemia.   Medication changes:   Continue: Lantus 34 units nightly  Metformin 500 mg BID - need to monitor GFR - low threshold to stop if GFR continues to worsen.  Tradjenta 5 mg daily  Reviewed patient's current insulin regimen.   Advised frequent self blood glucose monitoring. Patient encouraged to document glucose results and bring them to every clinic visit.  Hypoglycemia precautions discussed. Instructed on precautions before driving.    Close adherence to lifestyle changes recommended.       Kidneys: Urine microalbumin/Cr elevated - has nephrology follow-up later this month.     Adrenal cortical nodule: repeat CT 6/2016  Stable on imaging for over a decade suggests benign adenomas. She thinks she had a biochemical workup in the past, but the results aren't available in our system as they predate 2004. Blood pressure is reasonably well-controlled. No hypokalemia. No symptoms to suggest pheo, and no overt Cushing syndrome. Subclinical Cushing is a possibility, but the benefit vs. risk of adrenalectomy would very likely favor conservative management. Therefore, will hold off on further testing at this point since it would be unlikely to alter management.     Nontoxic multinodular goiter  Repeat ultrasound in 9/2021.   Denies compressive symptoms    Hyperlipidemia  On simvastatin  Managed by Dr. Gaston     Osteoporosis  Patient does not wish to pursue antiresorptive treatments citing previous side-effects and wanting to avoid new medications due to fear of side-effects (Prolia specifically).  LONG DISCUSSION about fall precautions and decrease in ADL's/indepndence if she would fracture.  Discussed prolia in depth, MOA & possible SE's  Pamphlet given on prolia- she will think about it and let me know.     Taking vitamin D 2000 IU daily and she gets calcium in her diet.    Walking for weight bearing exercise.     Repeat  BMD next year per pt request.     HTN  -managed by dr fowler    Follow up in about 6 months (around 8/20/2020).   To discuss osteoporosis medications

## 2020-02-18 NOTE — PROGRESS NOTES
Digital Medicine: Clinician Follow-Up    Called patient to follow up. Patient endorses adherence to medication regimen. Patient denies hypotensive s/sx (lightheadedness, dizziness, nausea, fatigue); patient denies hypertensive s/sx (SOB, CP, severe headaches, changes in vision). Instructed patient to seek medical care if BP > 180/110 and is accompanied by hypertensive s/sx associated, patient confirms understanding.     She reports that she has been checking her BP frequently however the readings have not been populated onto Epic.      The history is provided by the patient. No  was used.     Follow Up  Follow-up reason(s): medication change and routine education      Medication Change: dose decrease  Assessment:  Reviewed recent readings. Per 2017 ACC/ AHA HTN guidelines (goal of BP < 130/80), current 30-day average need to be addressed more throroughly today however BP is close to goal.     Last 5 Patient Entered Readings                                      Current 30 Day Average: 133/64     Recent Readings 2/13/2020 2/7/2020 2/3/2020 1/29/2020 1/25/2020    SBP (mmHg) 146 145 124 145 106    DBP (mmHg) 70 69 58 68 55    Pulse 73 85 75 78 84        INTERVENTION(S)  reviewed appropriate dose schedule, reviewed monitoring technique, encouragement/support and goal setting    PLAN  patient verbalizes understanding and continue monitoring    >Reviewed patient's most recent BMP (Na+ subtherapeutic at 134 and eGFR 30.5 mL/min) with her and informed her to discontinue chlorthalidone 25 mg daily. She reports that due to heart failure, she would rather keep taking the medication.  >Informed patient to cut chlorthalidone tablet in half and only take half tablet daily until she sees her PCP on Friday. She will discuss further with PCP if chlorthalidone is needed at this time.   >Informed patient to open [id number] after encounter to get readings to populate onto Epic.   >I will continue to monitor  regularly and will follow-up in 2 to 3 weeks, sooner if blood pressure begins to trend upward or downward.   >Patient denies having questions or concerns. Patient has my contact information and knows to call with any concerns or clinical changes.      There are no preventive care reminders to display for this patient.    Hypertension Medications             carvedilol (COREG) 25 MG tablet Take 1 tablet (25 mg total) by mouth 2 (two) times daily with meals.    chlorthalidone (HYGROTEN) 25 MG Tab TAKE 1 TABLET BY MOUTH EVERY DAY    cloNIDine 0.1 mg/24 hr td ptwk (CATAPRES) 0.1 mg/24 hr Place 1 patch onto the skin every 7 days. (Saturdays)    diltiaZEM (CARDIZEM CD) 120 MG Cp24 Take 1 capsule (120 mg total) by mouth once daily.    hydrALAZINE (APRESOLINE) 100 MG tablet Take 1 tablet (100 mg total) by mouth 3 (three) times daily.    irbesartan (AVAPRO) 300 MG tablet Take 1 tablet (300 mg total) by mouth every evening.    nitroGLYCERIN (NITROSTAT) 0.4 MG SL tablet Place 0.4 mg under the tongue every 5 (five) minutes as needed for Chest pain.

## 2020-02-19 LAB
ACID FAST MOD KINY STN SPEC: NORMAL
MYCOBACTERIUM SPEC QL CULT: NORMAL

## 2020-02-20 ENCOUNTER — OFFICE VISIT (OUTPATIENT)
Dept: INTERNAL MEDICINE | Facility: CLINIC | Age: 80
End: 2020-02-20
Payer: MEDICARE

## 2020-02-20 ENCOUNTER — OFFICE VISIT (OUTPATIENT)
Dept: ENDOCRINOLOGY | Facility: CLINIC | Age: 80
End: 2020-02-20
Payer: MEDICARE

## 2020-02-20 VITALS
DIASTOLIC BLOOD PRESSURE: 66 MMHG | WEIGHT: 157.88 LBS | OXYGEN SATURATION: 97 % | HEIGHT: 63 IN | SYSTOLIC BLOOD PRESSURE: 138 MMHG | HEART RATE: 92 BPM | TEMPERATURE: 98 F | BODY MASS INDEX: 27.97 KG/M2

## 2020-02-20 VITALS
BODY MASS INDEX: 27.72 KG/M2 | HEIGHT: 63 IN | HEART RATE: 77 BPM | WEIGHT: 156.44 LBS | DIASTOLIC BLOOD PRESSURE: 68 MMHG | SYSTOLIC BLOOD PRESSURE: 144 MMHG

## 2020-02-20 DIAGNOSIS — R94.4 DECREASED GFR: ICD-10-CM

## 2020-02-20 DIAGNOSIS — R79.89 LOW SERUM SODIUM: ICD-10-CM

## 2020-02-20 DIAGNOSIS — S22.31XS CLOSED FRACTURE OF ONE RIB OF RIGHT SIDE, SEQUELA: ICD-10-CM

## 2020-02-20 DIAGNOSIS — I10 ESSENTIAL HYPERTENSION: Primary | ICD-10-CM

## 2020-02-20 DIAGNOSIS — C44.40 SKIN CANCER OF SCALP: ICD-10-CM

## 2020-02-20 DIAGNOSIS — E11.42 DIABETIC POLYNEUROPATHY ASSOCIATED WITH TYPE 2 DIABETES MELLITUS: ICD-10-CM

## 2020-02-20 DIAGNOSIS — J90 PLEURAL EFFUSION: ICD-10-CM

## 2020-02-20 PROCEDURE — 3078F DIAST BP <80 MM HG: CPT | Mod: HCNC,CPTII,S$GLB, | Performed by: INTERNAL MEDICINE

## 2020-02-20 PROCEDURE — 99214 OFFICE O/P EST MOD 30 MIN: CPT | Mod: HCNC,S$GLB,, | Performed by: INTERNAL MEDICINE

## 2020-02-20 PROCEDURE — 3078F DIAST BP <80 MM HG: CPT | Mod: HCNC,CPTII,S$GLB, | Performed by: NURSE PRACTITIONER

## 2020-02-20 PROCEDURE — 1126F PR PAIN SEVERITY QUANTIFIED, NO PAIN PRESENT: ICD-10-PCS | Mod: HCNC,S$GLB,, | Performed by: NURSE PRACTITIONER

## 2020-02-20 PROCEDURE — 1101F PR PT FALLS ASSESS DOC 0-1 FALLS W/OUT INJ PAST YR: ICD-10-PCS | Mod: HCNC,CPTII,S$GLB, | Performed by: INTERNAL MEDICINE

## 2020-02-20 PROCEDURE — 1159F MED LIST DOCD IN RCRD: CPT | Mod: HCNC,S$GLB,, | Performed by: INTERNAL MEDICINE

## 2020-02-20 PROCEDURE — 1101F PT FALLS ASSESS-DOCD LE1/YR: CPT | Mod: HCNC,CPTII,S$GLB, | Performed by: NURSE PRACTITIONER

## 2020-02-20 PROCEDURE — 99999 PR PBB SHADOW E&M-EST. PATIENT-LVL III: CPT | Mod: PBBFAC,HCNC,, | Performed by: INTERNAL MEDICINE

## 2020-02-20 PROCEDURE — 1126F AMNT PAIN NOTED NONE PRSNT: CPT | Mod: HCNC,S$GLB,, | Performed by: NURSE PRACTITIONER

## 2020-02-20 PROCEDURE — 3078F PR MOST RECENT DIASTOLIC BLOOD PRESSURE < 80 MM HG: ICD-10-PCS | Mod: HCNC,CPTII,S$GLB, | Performed by: INTERNAL MEDICINE

## 2020-02-20 PROCEDURE — 3051F PR MOST RECENT HEMOGLOBIN A1C LEVEL 7.0 - < 8.0%: ICD-10-PCS | Mod: HCNC,CPTII,S$GLB, | Performed by: NURSE PRACTITIONER

## 2020-02-20 PROCEDURE — 3051F HG A1C>EQUAL 7.0%<8.0%: CPT | Mod: HCNC,CPTII,S$GLB, | Performed by: NURSE PRACTITIONER

## 2020-02-20 PROCEDURE — 99999 PR PBB SHADOW E&M-EST. PATIENT-LVL III: CPT | Mod: PBBFAC,HCNC,, | Performed by: NURSE PRACTITIONER

## 2020-02-20 PROCEDURE — 1101F PR PT FALLS ASSESS DOC 0-1 FALLS W/OUT INJ PAST YR: ICD-10-PCS | Mod: HCNC,CPTII,S$GLB, | Performed by: NURSE PRACTITIONER

## 2020-02-20 PROCEDURE — 1159F PR MEDICATION LIST DOCUMENTED IN MEDICAL RECORD: ICD-10-PCS | Mod: HCNC,S$GLB,, | Performed by: INTERNAL MEDICINE

## 2020-02-20 PROCEDURE — 99499 UNLISTED E&M SERVICE: CPT | Mod: HCNC,S$GLB,, | Performed by: NURSE PRACTITIONER

## 2020-02-20 PROCEDURE — 1159F PR MEDICATION LIST DOCUMENTED IN MEDICAL RECORD: ICD-10-PCS | Mod: HCNC,S$GLB,, | Performed by: NURSE PRACTITIONER

## 2020-02-20 PROCEDURE — 1126F PR PAIN SEVERITY QUANTIFIED, NO PAIN PRESENT: ICD-10-PCS | Mod: HCNC,S$GLB,, | Performed by: INTERNAL MEDICINE

## 2020-02-20 PROCEDURE — 99999 PR PBB SHADOW E&M-EST. PATIENT-LVL III: ICD-10-PCS | Mod: PBBFAC,HCNC,, | Performed by: NURSE PRACTITIONER

## 2020-02-20 PROCEDURE — 3075F SYST BP GE 130 - 139MM HG: CPT | Mod: HCNC,CPTII,S$GLB, | Performed by: INTERNAL MEDICINE

## 2020-02-20 PROCEDURE — 99499 RISK ADDL DX/OHS AUDIT: ICD-10-PCS | Mod: HCNC,S$GLB,, | Performed by: NURSE PRACTITIONER

## 2020-02-20 PROCEDURE — 3074F PR MOST RECENT SYSTOLIC BLOOD PRESSURE < 130 MM HG: ICD-10-PCS | Mod: HCNC,CPTII,S$GLB, | Performed by: NURSE PRACTITIONER

## 2020-02-20 PROCEDURE — 1101F PT FALLS ASSESS-DOCD LE1/YR: CPT | Mod: HCNC,CPTII,S$GLB, | Performed by: INTERNAL MEDICINE

## 2020-02-20 PROCEDURE — 99214 PR OFFICE/OUTPT VISIT, EST, LEVL IV, 30-39 MIN: ICD-10-PCS | Mod: HCNC,S$GLB,, | Performed by: INTERNAL MEDICINE

## 2020-02-20 PROCEDURE — 99214 OFFICE O/P EST MOD 30 MIN: CPT | Mod: HCNC,S$GLB,, | Performed by: NURSE PRACTITIONER

## 2020-02-20 PROCEDURE — 3078F PR MOST RECENT DIASTOLIC BLOOD PRESSURE < 80 MM HG: ICD-10-PCS | Mod: HCNC,CPTII,S$GLB, | Performed by: NURSE PRACTITIONER

## 2020-02-20 PROCEDURE — 1159F MED LIST DOCD IN RCRD: CPT | Mod: HCNC,S$GLB,, | Performed by: NURSE PRACTITIONER

## 2020-02-20 PROCEDURE — 3075F PR MOST RECENT SYSTOLIC BLOOD PRESS GE 130-139MM HG: ICD-10-PCS | Mod: HCNC,CPTII,S$GLB, | Performed by: INTERNAL MEDICINE

## 2020-02-20 PROCEDURE — 99214 PR OFFICE/OUTPT VISIT, EST, LEVL IV, 30-39 MIN: ICD-10-PCS | Mod: HCNC,S$GLB,, | Performed by: NURSE PRACTITIONER

## 2020-02-20 PROCEDURE — 99999 PR PBB SHADOW E&M-EST. PATIENT-LVL III: ICD-10-PCS | Mod: PBBFAC,HCNC,, | Performed by: INTERNAL MEDICINE

## 2020-02-20 PROCEDURE — 1126F AMNT PAIN NOTED NONE PRSNT: CPT | Mod: HCNC,S$GLB,, | Performed by: INTERNAL MEDICINE

## 2020-02-20 PROCEDURE — 3074F SYST BP LT 130 MM HG: CPT | Mod: HCNC,CPTII,S$GLB, | Performed by: NURSE PRACTITIONER

## 2020-02-20 RX ORDER — INSULIN GLARGINE 100 [IU]/ML
34 INJECTION, SOLUTION SUBCUTANEOUS NIGHTLY
Qty: 2 BOX | Refills: 11 | Status: SHIPPED | OUTPATIENT
Start: 2020-02-20 | End: 2020-07-02

## 2020-02-20 RX ORDER — METFORMIN HYDROCHLORIDE 500 MG/1
500 TABLET ORAL 2 TIMES DAILY WITH MEALS
Qty: 180 TABLET | Refills: 6 | Status: SHIPPED | OUTPATIENT
Start: 2020-02-20 | End: 2021-03-15 | Stop reason: SDUPTHER

## 2020-02-20 RX ORDER — PEN NEEDLE, DIABETIC 30 GX3/16"
NEEDLE, DISPOSABLE MISCELLANEOUS
Qty: 400 EACH | Refills: 3 | Status: SHIPPED | OUTPATIENT
Start: 2020-02-20 | End: 2020-12-02 | Stop reason: SDUPTHER

## 2020-02-20 NOTE — PROGRESS NOTES
Subjective:      Patient ID: Myesha Quinones is a 79 y.o. female.    Chief Complaint: Follow-up    HPI:  HPI   Picture of healing left temple surgery:      Looks like some separation in the incision line, seems to be healing well, possibly just leave it to air and not cover with lubricant but will ask Dr. Roche    2. CXR reviewed, crissy manning still has pain from rib fracture, sent message to Dr. Gaspar    3. Sent message to digital hypertension that I would like her to stay on the diuretic and recheck in one month    Diabetes Management Status    Statin: Taking  ACE/ARB: Taking    Screening or Prevention Patient's value Goal Complete/Controlled?   HgA1C Testing and Control   Lab Results   Component Value Date    HGBA1C 7.1 (H) 02/13/2020      Annually/Less than 8% Yes   Lipid profile : 01/17/2020 Annually Yes   LDL control Lab Results   Component Value Date    LDLCALC 58.6 (L) 01/17/2020    Annually/Less than 100 mg/dl  Yes   Nephropathy screening Lab Results   Component Value Date    LABMICR 335.0 01/21/2019     Lab Results   Component Value Date    PROTEINUA 2+ (A) 01/17/2020    Annually Yes   Blood pressure BP Readings from Last 1 Encounters:   02/20/20 138/66    Less than 140/90 Yes   Dilated retinal exam : 01/02/2020 Annually Yes   Foot exam   : 11/06/2019 Annually Yes       Patient Active Problem List   Diagnosis    History of nonmelanoma skin cancer    Nonischemic cardiomyopathy    LBBB (left bundle branch block)    Nontoxic multinodular goiter    Meningioma    Asthma, chronic    Biventricular ICD (implantable cardioverter-defibrillator) in place    Tremor    Coronary artery disease involving native coronary artery without angina pectoris - Non-obstructive 50% LAD    Essential hypertension    Hyperlipidemia    History of breast cancer    Adrenal cortical nodule: repeat CT 6/2016    Calcification of aorta    Diabetic polyneuropathy associated with type 2 diabetes mellitus    Osteoporosis     KHOA (obstructive sleep apnea)    Type 2 diabetes mellitus with stage 3 chronic kidney disease, with long-term current use of insulin    Chronic kidney disease, stage 3, mod decreased GFR    Iron deficiency anemia    Chemotherapy-induced neuropathy    Hypomagnesemia    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, with long-term current use of insulin    Hypertensive retinopathy, bilateral    Multiple lung nodules    COPD (chronic obstructive pulmonary disease)    Mixed conductive and sensorineural hearing loss    ICD (implantable cardioverter-defibrillator) battery depletion    Type 2 DM with CKD stage 3 and hypertension    Cardiomyopathy, ischemic    Metabolic bone disease    Proteinuria    Seasonal allergies    Pseudomonas pneumonia    Rib fracture    Parapneumonic effusion     Past Medical History:   Diagnosis Date    Acute hypoxemic respiratory failure 12/19/2019    Acute right-sided thoracic back pain 12/9/2019    Allergy     Asthma     Basal cell carcinoma     left forehead    Basal cell carcinoma     left nose    Basal cell carcinoma 05/20/2015    right nose    Basal cell carcinoma 12/22/2015    left lower post neck    Breast cancer     CAD (coronary artery disease)     Cardiomyopathy     Cardiomyopathy, ischemic     Cataract     CHF (congestive heart failure)     Chronic kidney disease, stage 3, mod decreased GFR 2/14/2017    Colon polyp 2011    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, with long-term current use of insulin 2/22/2018    COPD (chronic obstructive pulmonary disease)     COPD exacerbation 4/8/2018    Defibrillator discharge     Diabetes mellitus     Diabetes mellitus type II     Diabetes with neurologic complications     Goiter     MNG    HX: breast cancer     Hyperlipidemia     Hypertension     Iron  deficiency anemia 5/16/2017    Left kidney mass     Meningioma     Osteoporosis, postmenopausal     Pacemaker     Pneumonia 12/8/2019    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    Pseudomonas pneumonia     Skin cancer     s/p excision    Sleep apnea     CPAP    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/2015    mid forehead    Unspecified vitamin D deficiency     Ventricular tachycardia     Vitamin B12 deficiency     Vitamin D deficiency disease      Past Surgical History:   Procedure Laterality Date    BASAL CELL CARCINOMA EXCISION      posterior neck and nose    BREAST BIOPSY      BREAST CYST EXCISION Left     BREAST SURGERY      CARDIAC DEFIBRILLATOR PLACEMENT      x 2    CATARACT EXTRACTION W/  INTRAOCULAR LENS IMPLANT Bilateral     CHOLECYSTECTOMY      COLONOSCOPY N/A 11/5/2019    Procedure: COLONOSCOPY;  Surgeon: Boaz Botello MD;  Location: [facility] ENDO ([facility] FLR);  Service: Endoscopy;  Laterality: N/A;  AICD - Med[employer]onic -     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    FRACTURE SURGERY      left elbow and wrist as a child    HYSTERECTOMY      MASTECTOMY Right     REPLACEMENT OF IMPLANTABLE CARDIOVERTER-DEFIBRILLATOR (ICD) GENERATOR N/A 12/17/2018    Procedure: REPLACEMENT, ICD GENERATOR;  Surgeon: Jan Mckeon MD;  Location: [facility] EP LAB;  Service: Cardiology;  Laterality: N/A;  DONNA, CRT-D gen MD sulemaT, MAC, SK, 3 Prep    REVISION OF SKIN POCKET FOR CARDIOVERTER-DEFIBRILLATOR  12/17/2018    Procedure: Revision, Skin Pocket, For Cardioverter-Defibrillator;  Surgeon: Jan Mckeon MD;  Location: [facility] EP LAB;  Service: Cardiology;;    SQUAMOUS CELL CARCINOMA EXCISION      remved from forehead    TONSILLECTOMY       Family History   Problem Relation Age of Onset    Diabetes Father     Heart disease Father     Diabetes Sister     Heart disease Sister     Diabetes Brother     Heart disease Brother     Hypertension Brother     Diabetes Brother     Heart disease Brother      "Hypertension Brother     Diabetes Brother     Heart disease Brother     Cancer Brother         colon    Diabetes Brother     Cancer Son         skin    Diabetes Son         prediabetes    Diabetes Daughter         prediabetes    Cancer Daughter         melanoma    Obesity Daughter     Melanoma Daughter     Diabetes Son     Asthma Mother     Hypertension Mother     Stroke Mother     No Known Problems Maternal Grandmother     No Known Problems Maternal Grandfather     No Known Problems Paternal Grandmother     No Known Problems Paternal Grandfather     Amblyopia Neg Hx     Blindness Neg Hx     Cataracts Neg Hx     Glaucoma Neg Hx     Macular degeneration Neg Hx     Retinal detachment Neg Hx     Strabismus Neg Hx     Thyroid disease Neg Hx      Review of Systems   Constitutional: Negative for chills, fever and unexpected weight change.   HENT: Negative for trouble swallowing.         Equilibrium is better   Respiratory: Negative for cough, shortness of breath and wheezing.    Cardiovascular: Negative for chest pain and palpitations.   Gastrointestinal: Negative for abdominal distention, abdominal pain, blood in stool and vomiting.   Musculoskeletal: Negative for back pain.     Objective:     Vitals:    02/20/20 0818   BP: 138/66   Pulse: 92   Temp: 97.7 °F (36.5 °C)   TempSrc: Oral   SpO2: 97%   Weight: 71.6 kg (157 lb 13.6 oz)   Height: 5' 3" (1.6 m)   PainSc: 0-No pain     Body mass index is 27.96 kg/m².  Physical Exam   Constitutional: She is oriented to person, place, and time. She appears well-developed and well-nourished. No distress.   Neck: Carotid bruit is not present. No thyromegaly present.   Cardiovascular: Normal rate, regular rhythm and normal heart sounds. PMI is not displaced.   Pulmonary/Chest: Effort normal and breath sounds normal. No respiratory distress.   Abdominal: Soft. Bowel sounds are normal. She exhibits no distension. There is no tenderness.   Musculoskeletal: She " exhibits no edema.   Neurological: She is alert and oriented to person, place, and time.     Assessment:     1. Essential hypertension    2. Low serum sodium    3. Decreased GFR    4. Pleural effusion    5. Closed fracture of one rib of right side, sequela    6. Skin cancer of scalp      Plan:   Myesha was seen today for follow-up.    Diagnoses and all orders for this visit:    Essential hypertension  Comments:  No change in meds recheck the sodium and creatinine in one month    Low serum sodium  -     Basic metabolic panel; Future    Decreased GFR  -     Basic metabolic panel; Future    Pleural effusion  Comments:  Minimal, sent message to Dr Gaspar    Closed fracture of one rib of right side, sequela  Comments:  Continued pain, tylenol    Skin cancer of scalp  Comments:  Will send picture to Dr. Roche        Problem List Items Addressed This Visit     Essential hypertension - Primary    Rib fracture      Other Visit Diagnoses     Low serum sodium        Relevant Orders    Basic metabolic panel    Decreased GFR        Relevant Orders    Basic metabolic panel    Pleural effusion        Minimal, sent message to Dr Gaspar    Skin cancer of scalp        Will send picture to Dr. Roche        Orders Placed This Encounter   Procedures    Basic metabolic panel     Standing Status:   Future     Standing Expiration Date:   2/19/2021     Follow up in about 1 month (around 3/20/2020) for FU with BMP prior.     Medication List           Accurate as of February 20, 2020  9:08 AM. If you have any questions, ask your nurse or doctor.               CHANGE how you take these medications    blood sugar diagnostic Strp  Commonly known as:  Accu-Chek Angelica  Uses Accu-Check Angelica meter to test BG 4x/day  What changed:  additional instructions     insulin glargine 100 units/mL (3mL) SubQ pen  Commonly known as:  Lantus Solostar U-100 Insulin  Inject 15 Units into the skin every evening.  What changed:  how much to take     lancets  Misc  Commonly known as:  Accu-Chek Softclix Lancets  Uses Accu-Chek Angelica meter to test BG 4x/day  What changed:  additional instructions        CONTINUE taking these medications    acetaminophen 500 MG tablet  Commonly known as:  TYLENOL     B-12 DOTS ORAL     benzonatate 100 MG capsule  Commonly known as:  Tessalon Perles  Take 2 capsules (200 mg total) by mouth 3 (three) times daily as needed for Cough.     carvediloL 25 MG tablet  Commonly known as:  COREG  Take 1 tablet (25 mg total) by mouth 2 (two) times daily with meals.     chlorthalidone 25 MG Tab  Commonly known as:  HYGROTEN  Take 0.5 tablets (12.5 mg total) by mouth once daily.     cloNIDine 0.1 mg/24 hr td ptwk 0.1 mg/24 hr  Commonly known as:  CATAPRES  Place 1 patch onto the skin every 7 days. (Saturdays)     diltiaZEM 120 MG Cp24  Commonly known as:  CARDIZEM CD  Take 1 capsule (120 mg total) by mouth once daily.     Enteric Coated Aspirin 81 MG EC tablet  Generic drug:  aspirin     Fish Oil 500 mg Cap  Generic drug:  omega-3 fatty acids     fluticasone propionate 50 mcg/actuation nasal spray  Commonly known as:  FLONASE  2 sprays (100 mcg total) by Each Nare route once daily.     hydrALAZINE 100 MG tablet  Commonly known as:  APRESOLINE  Take 1 tablet (100 mg total) by mouth 3 (three) times daily.     irbesartan 300 MG tablet  Commonly known as:  AVAPRO  Take 1 tablet (300 mg total) by mouth every evening.     levocetirizine 5 MG tablet  Commonly known as:  XYZAL  Take 1 tablet (5 mg total) by mouth every evening.     lidocaine 5 %  Commonly known as:  LIDODERM  Place 1 patch onto the skin once daily. Place patch to right back. Leave on for 12 hours and remove for 12 hours.     linaGLIPtin 5 mg Tab tablet  Commonly known as:  Tradjenta  Take 1 tablet (5 mg total) by mouth once daily.     magnesium oxide 400 mg (241.3 mg magnesium) tablet  Commonly known as:  MAG-OX  Take 1 tablet (400 mg total) by mouth once daily.     meclizine 12.5 mg  "tablet  Commonly known as:  ANTIVERT  Take 1 tablet (12.5 mg total) by mouth 3 (three) times daily as needed for dizziness     metFORMIN 500 MG tablet  Commonly known as:  GLUCOPHAGE  Take 1 tablet (500 mg total) by mouth 2 (two) times daily with meals.     nitroGLYCERIN 0.4 MG SL tablet  Commonly known as:  NITROSTAT     ondansetron 4 MG Tbdl  Commonly known as:  ZOFRAN-ODT  Take 1 tablet (4 mg total) by mouth every 6 (six) hours as needed.     pen needle, diabetic 31 gauge x 3/16" Ndle  Commonly known as:  BD Ultra-Fine Mini Pen Needle  USE WITH LANTUS AT BEDTIME     pravastatin 40 MG tablet  Commonly known as:  PRAVACHOL  Take 1 tablet (40 mg total) by mouth once daily.     traMADol 50 mg tablet  Commonly known as:  ULTRAM  Take 1 tablet (50 mg total) by mouth nightly as needed for Pain. New right thoracic apin     Ventolin HFA 90 mcg/actuation inhaler  Generic drug:  albuterol  INHALE 2 PUFFS INTO THE LUNGS EVERY 6 (SIX) HOURS AS NEEDED FOR WHEEZING. RESCUE     Vitamin D3 2,000 unit Tab  Generic drug:  cholecalciferol (vitamin D3)     Wixela Inhub 250-50 mcg/dose diskus inhaler  Generic drug:  fluticasone-salmeterol 250-50 mcg/dose  Inhale 1 puff into the lungs 2 (two) times daily. This is a change from one month            "

## 2020-02-20 NOTE — Clinical Note
I took a picture of the skin that is healing, air or cover? Lubricant or not? It is healing well but has  in the healing process.

## 2020-02-22 DIAGNOSIS — I10 ESSENTIAL HYPERTENSION: ICD-10-CM

## 2020-02-24 RX ORDER — CLONIDINE 0.1 MG/24H
PATCH, EXTENDED RELEASE TRANSDERMAL
Qty: 12 PATCH | Refills: 3 | Status: SHIPPED | OUTPATIENT
Start: 2020-02-24 | End: 2021-04-08 | Stop reason: SDUPTHER

## 2020-02-26 ENCOUNTER — CLINICAL SUPPORT (OUTPATIENT)
Dept: OTOLARYNGOLOGY | Facility: CLINIC | Age: 80
End: 2020-02-26
Payer: MEDICARE

## 2020-02-26 DIAGNOSIS — Z46.1 ENCOUNTER FOR FITTING AND ADJUSTMENT OF HEARING AID OF BOTH EARS: Primary | ICD-10-CM

## 2020-02-26 PROCEDURE — 99499 NO LOS: ICD-10-PCS | Mod: S$GLB,,, | Performed by: AUDIOLOGIST-HEARING AID FITTER

## 2020-02-26 PROCEDURE — 99499 UNLISTED E&M SERVICE: CPT | Mod: S$GLB,,, | Performed by: AUDIOLOGIST-HEARING AID FITTER

## 2020-02-26 NOTE — PROGRESS NOTES
"  Susannah Carter, CCC-A  Audiologist - [facility] [address] [place] [address]  [email or web address]  [phone]    Patient: Myesha Quinones   MRN: [id number]  [address]  Home Phone [phone]   Work Phone Not on file.   Mobile [phone]   : 1940  PELAYO: 2020      HEARING AID FOLLOW-UP      Myesha Quinones missed her December appointment due to pneumonia.  Reports hearing aids are only working "ok" - doesn't hear very well with them, especially in crowds or when a person is whispering, covering mouth or bowing head.  Nothing has really changed lately, just the same issues she has had trouble with due to hearing loss ex: when in [employer],  talking, can hear but can't understand what's being said.  Otoscopy revealed slight wax AU with TMs visible AU.  She stated she is due to see Dr. Miranda on 3/9/20 for annual checkup and place order for audiogram. Advised her to schedule audiogram after appointment with Dr. Miranda.      Listening check of the Right hearing aid (#[id number]) good - cleaned mold & HA with wipe and brush - mold #[id number] - changed out cerustop with stock.  Listening check of the Left hearing aid (#[id number]) good - cleaned mold & HA with wipe and brush - mold #[id number] - changed out cerustop with stock.    Next appointment will complete audiogram to see if any adjustments are needed to current hearing aid settings.  Also, send hearing aids out (program loaners) before warranty is  as it is unable to be extended further.  Recommend send both hearing aids out at the same time using current custom molds and receivers.  Discussed [employer] warranty coverage as an option - patient will consider.    _______________________________  Susannah Carter, CCC-A  Audiologist    "

## 2020-02-28 ENCOUNTER — TELEPHONE (OUTPATIENT)
Dept: INTERNAL MEDICINE | Facility: CLINIC | Age: 80
End: 2020-02-28

## 2020-02-28 DIAGNOSIS — J90 PLEURAL EFFUSION: Primary | ICD-10-CM

## 2020-02-28 NOTE — TELEPHONE ENCOUNTER
----- Message from Loretta Gaspar MD sent at 2/26/2020  8:30 AM CST -----  Contact: Emelina Gaston  Lets follow with CXR in 6 weeks. Effusion is much smaller. NTD for now.     Thanks,   Loretta  ----- Message -----  From: Emelina Gaston MD  Sent: 2/20/2020   8:46 AM CST  To: Loretta Gaspar MD    Good Morning,  Mrs. Quinones had a CXR on 2/4. Please tell me if anything else should be done. Thank you for all your help.  Best,  Emelina

## 2020-02-28 NOTE — TELEPHONE ENCOUNTER
Please tell her that Dr. Gaspar responded to me and would like another CXR in about 6 weeks.    Order for CXR placed, please schedule in 6 weeks. GMl

## 2020-03-02 ENCOUNTER — PATIENT OUTREACH (OUTPATIENT)
Dept: ADMINISTRATIVE | Facility: HOSPITAL | Age: 80
End: 2020-03-02

## 2020-03-02 ENCOUNTER — TELEPHONE (OUTPATIENT)
Dept: DERMATOLOGY | Facility: CLINIC | Age: 80
End: 2020-03-02

## 2020-03-02 NOTE — TELEPHONE ENCOUNTER
----- Message from Diaz Roche MD sent at 3/1/2020  3:36 PM CST -----  See below message from Dr Gaston sent to me directly.  Please call patient and tell her to keep aquaphor on the area in the center and not let it scab and to schedule a postop appt with me in a few weeks if she does not have one.  Please also let her know that I was on vacation last week and did not see this message and that was the reason for the delay in calling her.    Thanks!    ----- Message -----  From: Emelina Gaston MD  Sent: 2/20/2020   9:10 AM CST  To: Diaz Roche MD    I took a picture of the skin that is healing, air or cover? Lubricant or not? It is healing well but has  in the healing process.

## 2020-03-02 NOTE — TELEPHONE ENCOUNTER
Pt had Mohs sx on 1/9/2020 for BCC forehead. Dr. Roche received a message from Dr. Gaston regarding the incision site. Did a follow up call to pt and informed her to keep the area moist and do not let it scab over. Offered to scheduled pt a wound check appointment in a few weeks, patient declined. She stated that the area is feeling better and looking better. And at this time she does not feel it is necessary to come in for an appointment. She stated that if she is having any issues she will call for an appointment. Pt appreciated the call back.

## 2020-03-03 ENCOUNTER — PATIENT OUTREACH (OUTPATIENT)
Dept: OTHER | Facility: OTHER | Age: 80
End: 2020-03-03

## 2020-03-03 NOTE — PROGRESS NOTES
Digital Medicine: Clinician Follow-Up    Called patient to follow up. Patient endorses adherence to medication regimen. Patient denies hypotensive s/sx (lightheadedness, dizziness, nausea, fatigue); patient denies hypertensive s/sx (SOB, CP, severe headaches, changes in vision). Instructed patient to seek medical care if BP > 180/110 and is accompanied by hypertensive s/sx associated, patient confirms understanding.     She reports that she will have repeat labs done this month for sodium and renal assessment. She reports that her PCP informed her to keep her diuretic dose the same for now. BP has been trending downward since last encounter.       The history is provided by the patient. No  was used.     Follow Up  Follow-up reason(s): routine education      Assessment:  Reviewed recent readings. Per 2017 ACC/ AHA HTN guidelines (goal of BP < 130/80), current 30-day average need to be addressed more throroughly today.       Last 5 Patient Entered Readings                                      Current 30 Day Average: 140/66     Recent Readings 3/2/2020 2/26/2020 2/18/2020 2/14/2020 2/13/2020    SBP (mmHg) 124 148 129 166 146    DBP (mmHg) 56 73 60 74 70    Pulse 79 71 72 76 73        INTERVENTION(S)  reviewed appropriate dose schedule, reviewed monitoring technique, encouragement/support and goal setting    PLAN  patient verbalizes understanding and continue monitoring    >Continue current medication regimen.   >I will continue to monitor regularly and will follow-up in ~6 weeks, sooner if blood pressure begins to trend upward or downward.   >Patient denies having questions or concerns. Patient has my contact information and knows to call with any concerns or clinical changes.      There are no preventive care reminders to display for this patient.    Hypertension Medications             carvedilol (COREG) 25 MG tablet Take 1 tablet (25 mg total) by mouth 2 (two) times daily with meals.     chlorthalidone (HYGROTEN) 25 MG Tab Take 0.5 tablets (12.5 mg total) by mouth once daily.    cloNIDine 0.1 mg/24 hr td ptwk (CATAPRES) 0.1 mg/24 hr PLACE 1 PATCH ONTO THE SKIN EVERY 7 DAYS.    diltiaZEM (CARDIZEM CD) 120 MG Cp24 Take 1 capsule (120 mg total) by mouth once daily.    hydrALAZINE (APRESOLINE) 100 MG tablet Take 1 tablet (100 mg total) by mouth 3 (three) times daily.    irbesartan (AVAPRO) 300 MG tablet Take 1 tablet (300 mg total) by mouth every evening.    nitroGLYCERIN (NITROSTAT) 0.4 MG SL tablet Place 0.4 mg under the tongue every 5 (five) minutes as needed for Chest pain.

## 2020-03-05 DIAGNOSIS — E11.42 DIABETIC POLYNEUROPATHY ASSOCIATED WITH TYPE 2 DIABETES MELLITUS: ICD-10-CM

## 2020-03-06 ENCOUNTER — TELEPHONE (OUTPATIENT)
Dept: ENDOCRINOLOGY | Facility: CLINIC | Age: 80
End: 2020-03-06

## 2020-03-06 ENCOUNTER — TELEPHONE (OUTPATIENT)
Dept: PODIATRY | Facility: CLINIC | Age: 80
End: 2020-03-06

## 2020-03-06 NOTE — TELEPHONE ENCOUNTER
----- Message from Troy Ramos RN sent at 3/6/2020  3:53 PM CST -----  Contact: Shawna park/STEFAN      ----- Message -----  From: Ro Anna  Sent: 3/6/2020   3:37 PM CST  To: Salvatore DSOUZA Staff    Please give Shawna with [facility] pharmacy a call @ [phone]      [facility]/pharmacy #[id number] - [place] - [address] AT [facility]  [address] [address]  Phone: [phone] Fax: [phone]

## 2020-03-09 ENCOUNTER — OFFICE VISIT (OUTPATIENT)
Dept: OTOLARYNGOLOGY | Facility: CLINIC | Age: 80
End: 2020-03-09
Payer: MEDICARE

## 2020-03-09 VITALS
HEIGHT: 63 IN | SYSTOLIC BLOOD PRESSURE: 186 MMHG | WEIGHT: 159.06 LBS | DIASTOLIC BLOOD PRESSURE: 77 MMHG | TEMPERATURE: 98 F | BODY MASS INDEX: 28.18 KG/M2 | HEART RATE: 86 BPM

## 2020-03-09 DIAGNOSIS — H90.A32 MIXED CONDUCTIVE AND SENSORINEURAL HEARING LOSS OF LEFT EAR WITH RESTRICTED HEARING OF RIGHT EAR: ICD-10-CM

## 2020-03-09 DIAGNOSIS — H61.23 BILATERAL IMPACTED CERUMEN: ICD-10-CM

## 2020-03-09 DIAGNOSIS — J30.89 NON-SEASONAL ALLERGIC RHINITIS, UNSPECIFIED TRIGGER: ICD-10-CM

## 2020-03-09 DIAGNOSIS — H83.8X2 SUPERIOR SEMICIRCULAR CANAL DEHISCENCE OF LEFT EAR: Primary | ICD-10-CM

## 2020-03-09 PROCEDURE — 69210 PR REMOVAL IMPACTED CERUMEN REQUIRING INSTRUMENTATION, UNILATERAL: ICD-10-PCS | Mod: HCNC,S$GLB,, | Performed by: OTOLARYNGOLOGY

## 2020-03-09 PROCEDURE — 69210 REMOVE IMPACTED EAR WAX UNI: CPT | Mod: HCNC,S$GLB,, | Performed by: OTOLARYNGOLOGY

## 2020-03-09 PROCEDURE — 3077F SYST BP >= 140 MM HG: CPT | Mod: HCNC,CPTII,S$GLB, | Performed by: OTOLARYNGOLOGY

## 2020-03-09 PROCEDURE — 99213 PR OFFICE/OUTPT VISIT, EST, LEVL III, 20-29 MIN: ICD-10-PCS | Mod: 25,HCNC,S$GLB, | Performed by: OTOLARYNGOLOGY

## 2020-03-09 PROCEDURE — 1126F AMNT PAIN NOTED NONE PRSNT: CPT | Mod: HCNC,S$GLB,, | Performed by: OTOLARYNGOLOGY

## 2020-03-09 PROCEDURE — 1126F PR PAIN SEVERITY QUANTIFIED, NO PAIN PRESENT: ICD-10-PCS | Mod: HCNC,S$GLB,, | Performed by: OTOLARYNGOLOGY

## 2020-03-09 PROCEDURE — 1159F PR MEDICATION LIST DOCUMENTED IN MEDICAL RECORD: ICD-10-PCS | Mod: HCNC,S$GLB,, | Performed by: OTOLARYNGOLOGY

## 2020-03-09 PROCEDURE — 1159F MED LIST DOCD IN RCRD: CPT | Mod: HCNC,S$GLB,, | Performed by: OTOLARYNGOLOGY

## 2020-03-09 PROCEDURE — 99213 OFFICE O/P EST LOW 20 MIN: CPT | Mod: 25,HCNC,S$GLB, | Performed by: OTOLARYNGOLOGY

## 2020-03-09 PROCEDURE — 3078F DIAST BP <80 MM HG: CPT | Mod: HCNC,CPTII,S$GLB, | Performed by: OTOLARYNGOLOGY

## 2020-03-09 PROCEDURE — 1101F PT FALLS ASSESS-DOCD LE1/YR: CPT | Mod: HCNC,CPTII,S$GLB, | Performed by: OTOLARYNGOLOGY

## 2020-03-09 PROCEDURE — 99999 PR PBB SHADOW E&M-EST. PATIENT-LVL III: CPT | Mod: PBBFAC,HCNC,, | Performed by: OTOLARYNGOLOGY

## 2020-03-09 PROCEDURE — 1101F PR PT FALLS ASSESS DOC 0-1 FALLS W/OUT INJ PAST YR: ICD-10-PCS | Mod: HCNC,CPTII,S$GLB, | Performed by: OTOLARYNGOLOGY

## 2020-03-09 PROCEDURE — 99999 PR PBB SHADOW E&M-EST. PATIENT-LVL III: ICD-10-PCS | Mod: PBBFAC,HCNC,, | Performed by: OTOLARYNGOLOGY

## 2020-03-09 PROCEDURE — 3078F PR MOST RECENT DIASTOLIC BLOOD PRESSURE < 80 MM HG: ICD-10-PCS | Mod: HCNC,CPTII,S$GLB, | Performed by: OTOLARYNGOLOGY

## 2020-03-09 PROCEDURE — 3077F PR MOST RECENT SYSTOLIC BLOOD PRESSURE >= 140 MM HG: ICD-10-PCS | Mod: HCNC,CPTII,S$GLB, | Performed by: OTOLARYNGOLOGY

## 2020-03-09 NOTE — PROCEDURES
Procedures     Procedure Note    CHIEF COMPLAINT:  Bilateral Cerumen Impaction    Description:  The patient was seated in an exam chair.  An ear speculum was placed in the right EAC and was examined under the microscope.  Suction and/or loop curettes were used to remove a large cerumen impaction. The TM shows central monomeric segment but otherwise normal appearance.  The procedure was repeated on the left side in a similar fashion. The TM w/ thickened with tympanosclerosis present.  No effusion. TM mobility reduced.   The patient tolerated the procedure well.

## 2020-03-09 NOTE — PROGRESS NOTES
"  Chief Complaint   Patient presents with    Ear Fullness   .     HPI:  Myesha Quinones is a very pleasant 79 y.o. female here to see me today for the first time for evaluation of hearing loss.  She states that she has had lifelong ear problems.  She reports that as a child she pulled a cup of scalding hot coffee on and inside her left ear which resulted in an eardrum perforation. She states ever since this incident she "has never had normal hearing or ear."  She relays that in 1998 she had breast cancer and had hearing loss associated with the chemotherapy and obtained her first set of hearing aids in 1999.  She is here today to establish with ENT as her prior ENT Dr. Tanner no longer takes her insurance. She was referred for evaluation of her ears and for to evaluate for cerumen removal as her hearing aids have not been working as well recently.  She reports hearing loss that has been gradually progressing over the last several years.  She has not had any recent issues with ear pain or ear drainage.  She denies a family history of hearing loss, and has not had any previous otologic surgery.  She denies any history of significant loud noise exposure.She denies issues with dizziness.    Interval HPI 3/9/2020:    Follow up SCC dehiscence, [place]  She reports that her hearing is stable  She saw her audiologist recently who referred her for evaluation of cerumen. She denies otalgia, otorrhea, tinnitus, or vertigo.    She continues on Astelin and Claritin as prescribed. She notes intermittent nasal congestion, post-nasal drip, and aural fullness. This does seem improved compared to before taking medication.    Past Medical History:   Diagnosis Date    Acute hypoxemic respiratory failure 12/19/2019    Acute right-sided thoracic back pain 12/9/2019    Allergy     Asthma     Basal cell carcinoma     left forehead    Basal cell carcinoma     left nose    Basal cell carcinoma 05/20/2015    right nose    Basal " cell carcinoma 12/22/2015    left lower post neck    Breast cancer     CAD (coronary artery disease)     Cardiomyopathy     Cardiomyopathy, ischemic     Cataract     CHF (congestive heart failure)     Chronic kidney disease, stage 3, mod decreased GFR 2/14/2017    Colon polyp 2011    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, with long-term current use of insulin 2/22/2018    COPD (chronic obstructive pulmonary disease)     COPD exacerbation 4/8/2018    Defibrillator discharge     Diabetes mellitus     Diabetes mellitus type II     Diabetes with neurologic complications     Goiter     MNG    HX: breast cancer     Hyperlipidemia     Hypertension     Iron deficiency anemia 5/16/2017    Left kidney mass     Meningioma     Osteoporosis, postmenopausal     Pacemaker     Pneumonia 12/8/2019    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    Pseudomonas pneumonia     Skin cancer     s/p excision    Sleep apnea     CPAP    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/2015    mid forehead    Unspecified vitamin D deficiency     Ventricular tachycardia     Vitamin B12 deficiency     Vitamin D deficiency disease      Social History     Socioeconomic History    Marital status:      Spouse name: Not on file    Number of children: Not on file    Years of education: Not on file    Highest education level: Not on file   Occupational History    Occupation: Homemaker     Employer: OTHER   Social Needs    Financial resource strain: Not on file    Food insecurity:     Worry: Not on file     Inability: Not on file    Transportation needs:     Medical: Not on file     Non-medical: Not on file   Tobacco Use    Smoking status: Never Smoker    Smokeless tobacco: Never Used   Substance and Sexual Activity    Alcohol use: No     Alcohol/week: 0.0 standard drinks    Drug use: No    Sexual activity: Yes     Partners: Male   Lifestyle     Physical activity:     Days per week: Not on file     Minutes per session: Not on file    Stress: Not on file   Relationships    Social connections:     Talks on phone: Not on file     Gets together: Not on file     Attends Yarsanism service: Not on file     Active member of club or organization: Not on file     Attends meetings of clubs or organizations: Not on file     Relationship status: Not on file   Other Topics Concern    Are you pregnant or think you may be? No    Breast-feeding No   Social History Narrative    Not on file     Past Surgical History:   Procedure Laterality Date    BASAL CELL CARCINOMA EXCISION      posterior neck and nose    BREAST BIOPSY      BREAST CYST EXCISION Left     BREAST SURGERY      CARDIAC DEFIBRILLATOR PLACEMENT      x 2    CATARACT EXTRACTION W/  INTRAOCULAR LENS IMPLANT Bilateral     CHOLECYSTECTOMY      COLONOSCOPY N/A 11/5/2019    Procedure: COLONOSCOPY;  Surgeon: Boaz Botello MD;  Location: [facility] ENDO ([address] [place] [address]);  Service: Endoscopy;  Laterality: N/A;  AICD - [employer] - sm    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    FRACTURE SURGERY      left elbow and wrist as a child    HYSTERECTOMY      MASTECTOMY Right     REPLACEMENT OF IMPLANTABLE CARDIOVERTER-DEFIBRILLATOR (ICD) GENERATOR N/A 12/17/2018    Procedure: REPLACEMENT, ICD GENERATOR;  Surgeon: Jan Mckeon MD;  Location: [facility] EP LAB;  Service: Cardiology;  Laterality: N/A;  DONNA, CRT-D gen MD sulemaT, MAC, SK, 3 Prep    REVISION OF SKIN POCKET FOR CARDIOVERTER-DEFIBRILLATOR  12/17/2018    Procedure: Revision, Skin Pocket, For Cardioverter-Defibrillator;  Surgeon: Jan Mckeon MD;  Location: [facility] EP LAB;  Service: Cardiology;;    SQUAMOUS CELL CARCINOMA EXCISION      remved from forehead    TONSILLECTOMY       Family History   Problem Relation Age of Onset    Diabetes Father     Heart disease Father     Diabetes Sister     Heart disease Sister     Diabetes Brother     Heart disease  Brother     Hypertension Brother     Diabetes Brother     Heart disease Brother     Hypertension Brother     Diabetes Brother     Heart disease Brother     Cancer Brother         colon    Diabetes Brother     Cancer Son         skin    Diabetes Son         prediabetes    Diabetes Daughter         prediabetes    Cancer Daughter         melanoma    Obesity Daughter     Melanoma Daughter     Diabetes Son     Asthma Mother     Hypertension Mother     Stroke Mother     No Known Problems Maternal Grandmother     No Known Problems Maternal Grandfather     No Known Problems Paternal Grandmother     No Known Problems Paternal Grandfather     Amblyopia Neg Hx     Blindness Neg Hx     Cataracts Neg Hx     Glaucoma Neg Hx     Macular degeneration Neg Hx     Retinal detachment Neg Hx     Strabismus Neg Hx     Thyroid disease Neg Hx          Review of Systems  General: negative for chills, fever or weight loss  Psychological: negative for mood changes or depression  Ophthalmic: negative for blurry vision, photophobia or eye pain  ENT: see HPI  Respiratory: no cough, shortness of breath, or wheezing  Cardiovascular: no chest pain or dyspnea on exertion  Gastrointestinal: no abdominal pain, change in bowel habits, or black/ bloody stools  Musculoskeletal: negative for gait disturbance or muscular weakness  Neurological: no syncope or seizures; no ataxia  Dermatological: negative for puritis,  rash and jaundice  Hematologic/lymphatic: no easy bruising, no new lumps or bumps      Physical Exam:    Vitals:    03/09/20 1028   BP: (!) 186/77   Pulse: 86   Temp: 97.6 °F (36.4 °C)       Constitutional: Well appearing / communicating without difficutly.  NAD.  Eyes: EOM I Bilaterally  Head/Face: Normocephalic.  Negative paranasal sinus pressure/tenderness.  Salivary glands WNL.  House Brackmann I Bilaterally.    Right Ear: Auricle normal appearance. External Auditory Canal with complete cerumen impaction  present.  Left Ear: Auricle normal appearance. External Auditory Canal with complete cerumen impaction present.   Rinne Air conduction >bone conduction bilaterally, Herrera midline.   Nose: No gross nasal septal deviation. Inferior Turbinates 3+ bilaterally. No septal perforation. No masses/lesions. External nasal skin appears normal without masses/lesions.  Oral Cavity: Gingiva/lips within normal limits.  Dentition/gingiva healthy appearing. Mucus membranes moist. Floor of mouth soft, no masses palpated. Oral Tongue mobile. Hard Palate appears normal.    Oropharynx: Base of tongue appears normal. No masses/lesions noted. Tonsillar fossa/pharyngeal wall without lesions. Posterior oropharynx WNL.  Soft palate without masses. Midline uvula.   Neck/Lymphatic: No LAD I-VI bilaterally.  No thyromegaly.  No masses noted on exam.    Mirror laryngoscopy/nasopharyngoscopy: Active gag reflex.  Unable to perform.    Neuro/Psychiatric: AOx3.  Normal mood and affect.   Cardiovascular: Normal carotid pulses bilaterally, no increasing jugular venous distention noted at cervical region bilaterally.    Respiratory: Normal respiratory effort, no stridor, no retractions noted.      Assessment:    ICD-10-CM ICD-9-CM    1. 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 Mixed conductive and sensorineural hearing loss of left ear with restricted hearing of right ear H90.A32 389.22    3. Bilateral impacted cerumen H61.23 380.4    4. 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 Diagnoses of Mixed conductive and sensorineural hearing loss of left ear with restricted hearing of right ear, Bilateral impacted cerumen, and Non-seasonal allergic rhinitis, unspecified trigger were also pertinent to this visit.      Plan:  No orders of the defined types were placed in this encounter.    We reviewed her  diagnosis of left semicircular canal dehiscence and how this affects the hearing. Continue binaural amplification. Cerumen removed today. Advised to avoid the use of q-tips and that she may use an OTC removal aid such as Debrox.     Follow up with audiologist for audiogram update and hearing aid maintenance.     Chronic rhinitis. Continue Flonase and  Xyzal.       Follow up 1 year    Brittany Metcalf MD

## 2020-03-11 ENCOUNTER — PATIENT OUTREACH (OUTPATIENT)
Dept: OTHER | Facility: OTHER | Age: 80
End: 2020-03-11

## 2020-03-12 NOTE — PROGRESS NOTES
Digital Medicine: Health  Follow-Up    Note made on 3/12/20. Patient reports everything is much better. Patient will be following-up with Dr. Gaston.     The history is provided by the patient. No  was used.     Follow Up  Follow-up reason(s): routine education      Routine Education Topics: eating patterns and physical activity      INTERVENTION(S)  encouragement/support, denied resources and denied questions    PLAN  patient verbalizes understanding, patient amenable to changes and continue monitoring      There are no preventive care reminders to display for this patient.    Last 5 Patient Entered Readings                                      Current 30 Day Average: 142/67     Recent Readings 3/11/2020 3/4/2020 3/2/2020 2/26/2020 2/18/2020    SBP (mmHg) 157 125 124 148 129    DBP (mmHg) 70 67 56 73 60    Pulse 71 80 79 71 72        Screenings    Mrs. Stevenson is feeling well and is pleased with BP readings overall. Her 30 day BP average 142/67 mmHg is slightly above goal, <130/80 mmHg. However, she has no new health goals to establish currently. No questions for improving dietary habits or physical activity.

## 2020-03-13 ENCOUNTER — LAB VISIT (OUTPATIENT)
Dept: LAB | Facility: HOSPITAL | Age: 80
End: 2020-03-13
Attending: INTERNAL MEDICINE
Payer: MEDICARE

## 2020-03-13 ENCOUNTER — TELEPHONE (OUTPATIENT)
Dept: INTERNAL MEDICINE | Facility: CLINIC | Age: 80
End: 2020-03-13

## 2020-03-13 DIAGNOSIS — R94.4 DECREASED GFR: ICD-10-CM

## 2020-03-13 DIAGNOSIS — R79.89 LOW SERUM SODIUM: ICD-10-CM

## 2020-03-13 LAB
ANION GAP SERPL CALC-SCNC: 7 MMOL/L (ref 8–16)
BUN SERPL-MCNC: 52 MG/DL (ref 8–23)
CALCIUM SERPL-MCNC: 9.7 MG/DL (ref 8.7–10.5)
CHLORIDE SERPL-SCNC: 104 MMOL/L (ref 95–110)
CO2 SERPL-SCNC: 26 MMOL/L (ref 23–29)
CREAT SERPL-MCNC: 1.5 MG/DL (ref 0.5–1.4)
EST. GFR  (AFRICAN AMERICAN): 37.7 ML/MIN/1.73 M^2
EST. GFR  (NON AFRICAN AMERICAN): 32.7 ML/MIN/1.73 M^2
GLUCOSE SERPL-MCNC: 99 MG/DL (ref 70–110)
POTASSIUM SERPL-SCNC: 4.7 MMOL/L (ref 3.5–5.1)
SODIUM SERPL-SCNC: 137 MMOL/L (ref 136–145)

## 2020-03-13 PROCEDURE — 36415 COLL VENOUS BLD VENIPUNCTURE: CPT | Mod: HCNC,PO

## 2020-03-13 PROCEDURE — 80048 BASIC METABOLIC PNL TOTAL CA: CPT | Mod: HCNC

## 2020-03-23 ENCOUNTER — TELEPHONE (OUTPATIENT)
Dept: ELECTROPHYSIOLOGY | Facility: CLINIC | Age: 80
End: 2020-03-23

## 2020-04-06 RX ORDER — CARVEDILOL 25 MG/1
TABLET ORAL
Qty: 360 TABLET | Refills: 3 | Status: SHIPPED | OUTPATIENT
Start: 2020-04-06 | End: 2021-04-18

## 2020-04-07 ENCOUNTER — TELEPHONE (OUTPATIENT)
Dept: INTERNAL MEDICINE | Facility: CLINIC | Age: 80
End: 2020-04-07

## 2020-04-07 NOTE — TELEPHONE ENCOUNTER
----- Message from RT Jose sent at 4/7/2020  8:37 AM CDT -----  Regarding: RE: rescheduled patient appointment  Primary care and wellness Radiology  ----- Message -----  From: Emelina Gaston MD  Sent: 4/7/2020   7:42 AM CDT  To: RT Jose  Subject: RE: rescheduled patient appointment              Zayra,     I forgot to ask which site.  Dr. Gaston  ----- Message -----  From: RT Jose  Sent: 4/7/2020   7:12 AM CDT  To: Emelina Gaston MD  Subject: rescheduled patient appointment                  Good morning Dr Gaston, I rescheduled Ms Quinones's appointment for April 22,2020 9:00am. She can come in pretty much anytime on that date. Thank you!!

## 2020-04-08 DIAGNOSIS — N18.30 TYPE 2 DM WITH CKD STAGE 3 AND HYPERTENSION: ICD-10-CM

## 2020-04-08 DIAGNOSIS — E11.22 TYPE 2 DM WITH CKD STAGE 3 AND HYPERTENSION: ICD-10-CM

## 2020-04-08 DIAGNOSIS — I12.9 TYPE 2 DM WITH CKD STAGE 3 AND HYPERTENSION: ICD-10-CM

## 2020-04-13 DIAGNOSIS — E11.22 TYPE 2 DM WITH CKD STAGE 3 AND HYPERTENSION: ICD-10-CM

## 2020-04-13 DIAGNOSIS — N18.30 TYPE 2 DM WITH CKD STAGE 3 AND HYPERTENSION: ICD-10-CM

## 2020-04-13 DIAGNOSIS — I12.9 TYPE 2 DM WITH CKD STAGE 3 AND HYPERTENSION: ICD-10-CM

## 2020-04-13 RX ORDER — DILTIAZEM HYDROCHLORIDE 120 MG/1
120 CAPSULE, COATED, EXTENDED RELEASE ORAL DAILY
Qty: 90 CAPSULE | Refills: 3 | Status: CANCELLED | OUTPATIENT
Start: 2020-04-13 | End: 2021-04-13

## 2020-04-14 NOTE — TELEPHONE ENCOUNTER
----- Message from Jenny Mccoy sent at 2020 11:04 AM CDT -----  Contact: pt's   OUT OF MED       Pharmacy has been calling     said he called yesterday   Someone called back and said  They were going to call in  Right now        Pharmacy still says they have not heard from the doctor's office    Patient and  not very happy  Said this has been over a week trying to get refilled          90 day   With refills     diltiaZEM (CARDIZEM CD) 120 MG Cp24 ()    [id number] - [place], [place] - [address] [facility]    Please call in  And call pt;s  when done  [phone]     Thanks

## 2020-04-15 DIAGNOSIS — N18.30 TYPE 2 DM WITH CKD STAGE 3 AND HYPERTENSION: ICD-10-CM

## 2020-04-15 DIAGNOSIS — E11.22 TYPE 2 DM WITH CKD STAGE 3 AND HYPERTENSION: ICD-10-CM

## 2020-04-15 DIAGNOSIS — I12.9 TYPE 2 DM WITH CKD STAGE 3 AND HYPERTENSION: ICD-10-CM

## 2020-04-15 RX ORDER — DILTIAZEM HYDROCHLORIDE 120 MG/1
120 CAPSULE, COATED, EXTENDED RELEASE ORAL DAILY
Qty: 90 CAPSULE | Refills: 3 | Status: CANCELLED | OUTPATIENT
Start: 2020-04-15 | End: 2021-04-15

## 2020-04-16 ENCOUNTER — TELEPHONE (OUTPATIENT)
Dept: NEPHROLOGY | Facility: CLINIC | Age: 80
End: 2020-04-16

## 2020-04-16 NOTE — TELEPHONE ENCOUNTER
Myesha Quinones  Female, 79 y.o., 1940  MRN:   [id number]  Phone:   [phone] (H)  PCP:   Emelina Gaston MD  Primary Cvg:   [employer]  Next Appt  With Internal Medicine  2020 at 8:00 AM  Overdue Rx Request     You 2 days ago         ----- Message from Jenny Mccoy sent at 2020 11:04 AM CDT -----  Contact: pt's   OUT OF MED       Pharmacy has been calling     said he called yesterday   Someone called back and said  They were going to call in  Right now         Pharmacy still says they have not heard from the doctor's office    Patient and  not very happy  Said this has been over a week trying to get refilled            90 day   With refills      diltiaZEM (CARDIZEM CD) 120 MG Cp24 ()     [id number] - [place], [place] - [address] [facility]     Please call in  And call pt;s  when done  [phone]      Thanks             Documentation        You routed conversation to Chinedu Castillo MD 2 days ago      Diana Stokes LPN routed conversation to Chinedu Castillo MD 3 days ago

## 2020-04-22 ENCOUNTER — TELEPHONE (OUTPATIENT)
Dept: INTERNAL MEDICINE | Facility: CLINIC | Age: 80
End: 2020-04-22

## 2020-04-22 ENCOUNTER — OFFICE VISIT (OUTPATIENT)
Dept: INTERNAL MEDICINE | Facility: CLINIC | Age: 80
End: 2020-04-22
Payer: MEDICARE

## 2020-04-22 DIAGNOSIS — N18.30 TYPE 2 DIABETES MELLITUS WITH STAGE 3 CHRONIC KIDNEY DISEASE, WITH LONG-TERM CURRENT USE OF INSULIN: Primary | ICD-10-CM

## 2020-04-22 DIAGNOSIS — E55.9 MILD VITAMIN D DEFICIENCY: ICD-10-CM

## 2020-04-22 DIAGNOSIS — E78.5 HYPERLIPIDEMIA, UNSPECIFIED HYPERLIPIDEMIA TYPE: ICD-10-CM

## 2020-04-22 DIAGNOSIS — Z79.4 TYPE 2 DIABETES MELLITUS WITH STAGE 3 CHRONIC KIDNEY DISEASE, WITH LONG-TERM CURRENT USE OF INSULIN: Primary | ICD-10-CM

## 2020-04-22 DIAGNOSIS — E11.22 TYPE 2 DIABETES MELLITUS WITH STAGE 3 CHRONIC KIDNEY DISEASE, WITH LONG-TERM CURRENT USE OF INSULIN: Primary | ICD-10-CM

## 2020-04-22 DIAGNOSIS — J90 PLEURAL EFFUSION: ICD-10-CM

## 2020-04-22 PROCEDURE — 1101F PT FALLS ASSESS-DOCD LE1/YR: CPT | Mod: HCNC,CPTII,95, | Performed by: INTERNAL MEDICINE

## 2020-04-22 PROCEDURE — 3051F HG A1C>EQUAL 7.0%<8.0%: CPT | Mod: HCNC,CPTII,95, | Performed by: INTERNAL MEDICINE

## 2020-04-22 PROCEDURE — 99214 OFFICE O/P EST MOD 30 MIN: CPT | Mod: HCNC,95,, | Performed by: INTERNAL MEDICINE

## 2020-04-22 PROCEDURE — 99499 RISK ADDL DX/OHS AUDIT: ICD-10-PCS | Mod: HCNC,95,, | Performed by: INTERNAL MEDICINE

## 2020-04-22 PROCEDURE — 99499 UNLISTED E&M SERVICE: CPT | Mod: HCNC,95,, | Performed by: INTERNAL MEDICINE

## 2020-04-22 PROCEDURE — 1159F PR MEDICATION LIST DOCUMENTED IN MEDICAL RECORD: ICD-10-PCS | Mod: HCNC,95,, | Performed by: INTERNAL MEDICINE

## 2020-04-22 PROCEDURE — 1159F MED LIST DOCD IN RCRD: CPT | Mod: HCNC,95,, | Performed by: INTERNAL MEDICINE

## 2020-04-22 PROCEDURE — 3051F PR MOST RECENT HEMOGLOBIN A1C LEVEL 7.0 - < 8.0%: ICD-10-PCS | Mod: HCNC,CPTII,95, | Performed by: INTERNAL MEDICINE

## 2020-04-22 PROCEDURE — 1101F PR PT FALLS ASSESS DOC 0-1 FALLS W/OUT INJ PAST YR: ICD-10-PCS | Mod: HCNC,CPTII,95, | Performed by: INTERNAL MEDICINE

## 2020-04-22 PROCEDURE — 99214 PR OFFICE/OUTPT VISIT, EST, LEVL IV, 30-39 MIN: ICD-10-PCS | Mod: HCNC,95,, | Performed by: INTERNAL MEDICINE

## 2020-04-22 NOTE — PROGRESS NOTES
Subjective:      Patient ID: Myesha Qiunones is a 79 y.o. female.    Chief Complaint: Follow-up (diabetes)    HPI:  HPI     BP: 142/62 141/60  P:  Weight: 157.4  Diabetes Management Status    Statin: Taking  ACE/ARB: Taking    Screening or Prevention Patient's value Goal Complete/Controlled?   HgA1C Testing and Control   Lab Results   Component Value Date    HGBA1C 7.1 (H) 02/13/2020      Annually/Less than 8% Yes   Lipid profile : 01/17/2020 Annually Yes   LDL control Lab Results   Component Value Date    LDLCALC 58.6 (L) 01/17/2020    Annually/Less than 100 mg/dl  Yes   Nephropathy screening Lab Results   Component Value Date    LABMICR 335.0 01/21/2019     Lab Results   Component Value Date    PROTEINUA 2+ (A) 01/17/2020    Annually Yes   Blood pressure BP Readings from Last 1 Encounters:   03/09/20 (!) 186/77    Less than 140/90 No   Dilated retinal exam : 01/02/2020 Annually Yes   Foot exam   : 11/06/2019 Annually Yes       Patient Active Problem List   Diagnosis    History of nonmelanoma skin cancer    Nonischemic cardiomyopathy    LBBB (left bundle branch block)    Nontoxic multinodular goiter    Meningioma    Asthma, chronic    Biventricular ICD (implantable cardioverter-defibrillator) in place    Tremor    Coronary artery disease involving native coronary artery without angina pectoris - Non-obstructive 50% LAD    Essential hypertension    Hyperlipidemia    History of breast cancer    Adrenal cortical nodule: repeat CT 6/2016    Calcification of aorta    Diabetic polyneuropathy associated with type 2 diabetes mellitus    Osteoporosis    KHOA (obstructive sleep apnea)    Type 2 diabetes mellitus with stage 3 chronic kidney disease, with long-term current use of insulin    Chronic kidney disease, stage 3, mod decreased GFR    Iron deficiency anemia    Chemotherapy-induced neuropathy    Hypomagnesemia    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, with long-term current use of insulin    Hypertensive retinopathy, bilateral    Multiple lung nodules    COPD (chronic obstructive pulmonary disease)    Mixed conductive and sensorineural hearing loss    ICD (implantable cardioverter-defibrillator) battery depletion    Type 2 DM with CKD stage 3 and hypertension    Cardiomyopathy, ischemic    Metabolic bone disease    Proteinuria    Seasonal allergies    Pseudomonas pneumonia    Rib fracture    Parapneumonic effusion     Past Medical History:   Diagnosis Date    Acute hypoxemic respiratory failure 12/19/2019    Acute right-sided thoracic back pain 12/9/2019    Allergy     Asthma     Basal cell carcinoma     left forehead    Basal cell carcinoma     left nose    Basal cell carcinoma 05/20/2015    right nose    Basal cell carcinoma 12/22/2015    left lower post neck    Breast cancer     CAD (coronary artery disease)     Cardiomyopathy     Cardiomyopathy, ischemic     Cataract     CHF (congestive heart failure)     Chronic kidney disease, stage 3, mod decreased GFR 2/14/2017    Colon polyp 2011    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, with long-term current use of insulin 2/22/2018    COPD (chronic obstructive pulmonary disease)     COPD exacerbation 4/8/2018    Defibrillator discharge     Diabetes mellitus     Diabetes mellitus type II     Diabetes with neurologic complications     Goiter     MNG    HX: breast cancer     Hyperlipidemia     Hypertension     Iron deficiency anemia 5/16/2017    Left kidney mass     Meningioma     Osteoporosis, postmenopausal     Pacemaker     Pneumonia 12/8/2019    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    Pseudomonas pneumonia     Skin cancer     s/p excision    Sleep apnea     CPAP    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/2015    mid forehead    Unspecified  vitamin D deficiency     Ventricular tachycardia     Vitamin B12 deficiency     Vitamin D deficiency disease      Past Surgical History:   Procedure Laterality Date    BASAL CELL CARCINOMA EXCISION      posterior neck and nose    BREAST BIOPSY      BREAST CYST EXCISION Left     BREAST SURGERY      CARDIAC DEFIBRILLATOR PLACEMENT      x 2    CATARACT EXTRACTION W/  INTRAOCULAR LENS IMPLANT Bilateral     CHOLECYSTECTOMY      COLONOSCOPY N/A 11/5/2019    Procedure: COLONOSCOPY;  Surgeon: Boaz Botello MD;  [facility]  Service: Endoscopy;  Laterality: N/A;  AICD - [employer] -     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    FRACTURE SURGERY      left elbow and wrist as a child    HYSTERECTOMY      MASTECTOMY Right     REPLACEMENT OF IMPLANTABLE CARDIOVERTER-DEFIBRILLATOR (ICD) GENERATOR N/A 12/17/2018    Procedure: REPLACEMENT, ICD GENERATOR;  Surgeon: Jan Mckeon MD;  Location: [facility] EP LAB;  Service: Cardiology;  Laterality: N/A;  DONNA, CRT-D gen change, MDT, MAC, SK, 3 Prep    REVISION OF SKIN POCKET FOR CARDIOVERTER-DEFIBRILLATOR  12/17/2018    Procedure: Revision, Skin Pocket, For Cardioverter-Defibrillator;  Surgeon: Jan Mckeon MD;  Location: [facility] EP LAB;  Service: Cardiology;;    SQUAMOUS CELL CARCINOMA EXCISION      remved from forehead    TONSILLECTOMY       Family History   Problem Relation Age of Onset    Diabetes Father     Heart disease Father     Diabetes Sister     Heart disease Sister     Diabetes Brother     Heart disease Brother     Hypertension Brother     Diabetes Brother     Heart disease Brother     Hypertension Brother     Diabetes Brother     Heart disease Brother     Cancer Brother         colon    Diabetes Brother     Cancer Son         skin    Diabetes Son         prediabetes    Diabetes Daughter         prediabetes    Cancer Daughter         melanoma    Obesity Daughter     Melanoma Daughter     Diabetes Son     Asthma  Mother     Hypertension Mother     Stroke Mother     No Known Problems Maternal Grandmother     No Known Problems Maternal Grandfather     No Known Problems Paternal Grandmother     No Known Problems Paternal Grandfather     Amblyopia Neg Hx     Blindness Neg Hx     Cataracts Neg Hx     Glaucoma Neg Hx     Macular degeneration Neg Hx     Retinal detachment Neg Hx     Strabismus Neg Hx     Thyroid disease Neg Hx      Review of Systems   Constitutional: Negative for chills, fever and unexpected weight change.   HENT: Negative for trouble swallowing.    Respiratory: Negative for cough, shortness of breath and wheezing.         Slight cough   Cardiovascular: Negative for chest pain and palpitations.   Gastrointestinal: Negative for abdominal distention, abdominal pain, blood in stool and vomiting.   Musculoskeletal: Negative for back pain.     Objective:   There were no vitals filed for this visit.  There is no height or weight on file to calculate BMI.  Physical Exam  Assessment:     1. Type 2 diabetes mellitus with stage 3 chronic kidney disease, with long-term current use of insulin    2. Hyperlipidemia, unspecified hyperlipidemia type    3. Mild vitamin D deficiency    4. Pleural effusion      Plan:   Myesha was seen today for follow-up.    Diagnoses and all orders for this visit:    Type 2 diabetes mellitus with stage 3 chronic kidney disease, with long-term current use of insulin  Comments:  Last A1C very good , home monitoring  Orders:  -     Hemoglobin A1c; Future    Hyperlipidemia, unspecified hyperlipidemia type  Comments:  Continue statin, lab in 3 months  Orders:  -     CBC auto differential; Future  -     Comprehensive metabolic panel; Future  -     Lipid panel; Future    Mild vitamin D deficiency  Comments:  Check lab in 3 months  Orders:  -     Vitamin D; Future    Pleural effusion  Comments:  Patient feels well and would like to postpone the follow up of pleural effusion  Orders:  -      X-Ray Chest PA And Lateral; Future        Problem List Items Addressed This Visit     Hyperlipidemia    Relevant Orders    CBC auto differential    Comprehensive metabolic panel    Lipid panel    Type 2 diabetes mellitus with stage 3 chronic kidney disease, with long-term current use of insulin - Primary    Relevant Orders    Hemoglobin A1c      Other Visit Diagnoses     Mild vitamin D deficiency        Check lab in 3 months    Relevant Orders    Vitamin D    Pleural effusion        Patient feels well and would like to postpone the follow up of pleural effusion    Relevant Orders    X-Ray Chest PA And Lateral        Orders Placed This Encounter   Procedures    X-Ray Chest PA And Lateral     Standing Status:   Future     Standing Expiration Date:   6/21/2020    CBC auto differential     Standing Status:   Future     Standing Expiration Date:   6/22/2021    Comprehensive metabolic panel     Standing Status:   Future     Standing Expiration Date:   6/22/2021    Lipid panel     Standing Status:   Future     Standing Expiration Date:   6/22/2021    Hemoglobin A1c     Standing Status:   Future     Standing Expiration Date:   6/22/2021    Vitamin D     Standing Status:   Future     Standing Expiration Date:   6/22/2021     Follow up in about 3 months (around 7/22/2020) for Fu up with lab previously,  and wife like 7:00 and 7:30.     Medication List           Accurate as of April 22, 2020  4:04 PM. If you have any questions, ask your nurse or doctor.               CHANGE how you take these medications    lancets Misc  Commonly known as:  ACCU-CHEK SOFTCLIX LANCETS  Uses Accu-Chek Angelica meter to test BG 4x/day  What changed:  additional instructions        CONTINUE taking these medications    acetaminophen 500 MG tablet  Commonly known as:  TYLENOL     B-12 DOTS ORAL     benzonatate 100 MG capsule  Commonly known as:  TESSALON PERLES  Take 2 capsules (200 mg total) by mouth 3 (three) times daily as needed for  Cough.     blood sugar diagnostic Strp  Commonly known as:  ACCU-CHEK HECTOR  Uses Accu-Check Hector meter to test BG 4x/day     carvediloL 25 MG tablet  Commonly known as:  COREG  TAKE 2 TABLETS (50 MG TOTAL) BY MOUTH 2 (TWO) TIMES DAILY.     chlorthalidone 25 MG Tab  Commonly known as:  HYGROTEN  Take 0.5 tablets (12.5 mg total) by mouth once daily.     cloNIDine 0.1 mg/24 hr td ptwk 0.1 mg/24 hr  Commonly known as:  CATAPRES  PLACE 1 PATCH ONTO THE SKIN EVERY 7 DAYS.     diltiaZEM 120 MG Cp24  Commonly known as:  CARDIZEM CD  Take 1 capsule (120 mg total) by mouth once daily.     ENTERIC COATED ASPIRIN 81 MG EC tablet  Generic drug:  aspirin     FISH  mg Cap  Generic drug:  omega-3 fatty acids     fluticasone propionate 50 mcg/actuation nasal spray  Commonly known as:  FLONASE  2 sprays (100 mcg total) by Each Nare route once daily.     hydrALAZINE 100 MG tablet  Commonly known as:  APRESOLINE  Take 1 tablet (100 mg total) by mouth 3 (three) times daily.     insulin glargine 100 units/mL (3mL) SubQ pen  Commonly known as:  LANTUS SOLOSTAR U-100 INSULIN  Inject 34 Units into the skin every evening.     irbesartan 300 MG tablet  Commonly known as:  AVAPRO  Take 1 tablet (300 mg total) by mouth every evening.     levocetirizine 5 MG tablet  Commonly known as:  XYZAL  Take 1 tablet (5 mg total) by mouth every evening.     lidocaine 5 %  Commonly known as:  LIDODERM  Place 1 patch onto the skin once daily. Place patch to right back. Leave on for 12 hours and remove for 12 hours.     linaGLIPtin 5 mg Tab tablet  Commonly known as:  TRADJENTA  Take 1 tablet (5 mg total) by mouth once daily.     magnesium oxide 400 mg (241.3 mg magnesium) tablet  Commonly known as:  MAG-OX  Take 1 tablet (400 mg total) by mouth once daily.     meclizine 12.5 mg tablet  Commonly known as:  ANTIVERT  Take 1 tablet (12.5 mg total) by mouth 3 (three) times daily as needed for dizziness     metFORMIN 500 MG tablet  Commonly known as:   "GLUCOPHAGE  Take 1 tablet (500 mg total) by mouth 2 (two) times daily with meals.     nitroGLYCERIN 0.4 MG SL tablet  Commonly known as:  NITROSTAT     ondansetron 4 MG Tbdl  Commonly known as:  ZOFRAN-ODT  Take 1 tablet (4 mg total) by mouth every 6 (six) hours as needed.     pen needle, diabetic 31 gauge x 3/16" Ndle  Commonly known as:  BD ULTRA-FINE MINI PEN NEEDLE  USE WITH LANTUS AT BEDTIME     pravastatin 40 MG tablet  Commonly known as:  PRAVACHOL  Take 1 tablet (40 mg total) by mouth once daily.     traMADoL 50 mg tablet  Commonly known as:  ULTRAM  Take 1 tablet (50 mg total) by mouth nightly as needed for Pain. New right thoracic apin     VENTOLIN HFA 90 mcg/actuation inhaler  Generic drug:  albuterol  INHALE 2 PUFFS INTO THE LUNGS EVERY 6 (SIX) HOURS AS NEEDED FOR WHEEZING. RESCUE     VITAMIN D3 50 mcg (2,000 unit) Tab  Generic drug:  cholecalciferol (vitamin D3)     WIXELA INHUB 250-50 mcg/dose diskus inhaler  Generic drug:  fluticasone-salmeterol 250-50 mcg/dose  Inhale 1 puff into the lungs 2 (two) times daily. This is a change from one month            "

## 2020-04-23 RX ORDER — DILTIAZEM HYDROCHLORIDE 120 MG/1
120 CAPSULE, COATED, EXTENDED RELEASE ORAL DAILY
Qty: 90 CAPSULE | Refills: 3 | Status: SHIPPED | OUTPATIENT
Start: 2020-04-23 | End: 2020-09-30

## 2020-04-27 ENCOUNTER — TELEPHONE (OUTPATIENT)
Dept: OPHTHALMOLOGY | Facility: CLINIC | Age: 80
End: 2020-04-27

## 2020-04-27 NOTE — TELEPHONE ENCOUNTER
----- Message from Gregory Garces sent at 4/27/2020 12:05 PM CDT -----  Contact: pt  Calling to reschedule 05/07 appt    Call back: [phone]

## 2020-05-18 ENCOUNTER — CLINICAL SUPPORT (OUTPATIENT)
Dept: CARDIOLOGY | Facility: HOSPITAL | Age: 80
End: 2020-05-18
Attending: INTERNAL MEDICINE
Payer: MEDICARE

## 2020-05-18 DIAGNOSIS — Z95.810 AICD (AUTOMATIC CARDIOVERTER/DEFIBRILLATOR) PRESENT: ICD-10-CM

## 2020-05-18 PROCEDURE — 93295 CARDIAC DEVICE CHECK - REMOTE: ICD-10-PCS | Mod: ,,, | Performed by: INTERNAL MEDICINE

## 2020-05-18 PROCEDURE — 93296 REM INTERROG EVL PM/IDS: CPT | Mod: HCNC | Performed by: INTERNAL MEDICINE

## 2020-05-18 PROCEDURE — 93295 DEV INTERROG REMOTE 1/2/MLT: CPT | Mod: ,,, | Performed by: INTERNAL MEDICINE

## 2020-05-20 RX ORDER — IPRATROPIUM BROMIDE AND ALBUTEROL SULFATE 2.5; .5 MG/3ML; MG/3ML
SOLUTION RESPIRATORY (INHALATION)
Qty: 90 ML | Refills: 3 | Status: SHIPPED | OUTPATIENT
Start: 2020-05-20 | End: 2021-03-26 | Stop reason: SDUPTHER

## 2020-05-21 ENCOUNTER — PATIENT OUTREACH (OUTPATIENT)
Dept: OTHER | Facility: OTHER | Age: 80
End: 2020-05-21

## 2020-05-21 NOTE — PROGRESS NOTES
"Digital Medicine: Health  Follow-Up    Patient reports she is doing well.     The history is provided by the patient. No  was used.     Follow Up  Follow-up reason(s): routine education      Routine Education Topics: eating patterns      INTERVENTION(S)  encouragement/support, denied resources and denied questions    PLAN  patient verbalizes understanding, patient amenable to changes, Clinician follow-up and continue monitoring      There are no preventive care reminders to display for this patient.    Last 5 Patient Entered Readings                                      Current 30 Day Average: 150/69     Recent Readings 5/20/2020 5/19/2020 5/19/2020 5/18/2020 5/15/2020    SBP (mmHg) 154 177 174 158 147    DBP (mmHg) 69 75 80 64 62    Pulse 76 78 73 76 77        Asked patient about higher reading on 5/19/20. Patient states, "I felt a little weird". Patient reports she rested and felt better. Informed patient if she continues to receive higher readings with a "weird feeling" to contact her doctor.         Diet Screening   She has the following dietary restrictions: low sodium diet    Patient reports she is monitoring sodium intake. Patient did not want to focus on anything specific at this time.     Assigning the following patient goals: maintain low sodium diet        "

## 2020-05-27 ENCOUNTER — TELEPHONE (OUTPATIENT)
Dept: INTERNAL MEDICINE | Facility: CLINIC | Age: 80
End: 2020-05-27

## 2020-05-27 RX ORDER — PREDNISONE 20 MG/1
20 TABLET ORAL DAILY
Qty: 7 TABLET | Refills: 1 | Status: SHIPPED | OUTPATIENT
Start: 2020-05-27 | End: 2020-07-20

## 2020-05-27 RX ORDER — FLUTICASONE PROPIONATE AND SALMETEROL 250; 50 UG/1; UG/1
1 POWDER RESPIRATORY (INHALATION) 2 TIMES DAILY
Qty: 180 EACH | Refills: 3 | Status: SHIPPED | OUTPATIENT
Start: 2020-05-27 | End: 2020-09-30

## 2020-05-27 NOTE — TELEPHONE ENCOUNTER
Patient called noted more problems with her asthma.  She has not been using Advair.  She requested a refill which was sent to her pharmacy.  I also added prednisone 20 mg for 7 days.

## 2020-06-04 ENCOUNTER — OFFICE VISIT (OUTPATIENT)
Dept: OPHTHALMOLOGY | Facility: CLINIC | Age: 80
End: 2020-06-04
Payer: MEDICARE

## 2020-06-04 VITALS — DIASTOLIC BLOOD PRESSURE: 92 MMHG | SYSTOLIC BLOOD PRESSURE: 195 MMHG | HEART RATE: 95 BPM

## 2020-06-04 DIAGNOSIS — E11.3213 CONTROLLED TYPE 2 DIABETES MELLITUS WITH BOTH EYES AFFECTED BY MILD NONPROLIFERATIVE RETINOPATHY AND MACULAR EDEMA, WITH LONG-TERM CURRENT USE OF INSULIN: Primary | ICD-10-CM

## 2020-06-04 DIAGNOSIS — Z79.4 CONTROLLED TYPE 2 DIABETES MELLITUS WITH BOTH EYES AFFECTED BY MILD NONPROLIFERATIVE RETINOPATHY AND MACULAR EDEMA, WITH LONG-TERM CURRENT USE OF INSULIN: Primary | ICD-10-CM

## 2020-06-04 DIAGNOSIS — H35.033 HYPERTENSIVE RETINOPATHY, BILATERAL: ICD-10-CM

## 2020-06-04 PROCEDURE — 92134 CPTRZ OPH DX IMG PST SGM RTA: CPT | Mod: HCNC,S$GLB,, | Performed by: OPHTHALMOLOGY

## 2020-06-04 PROCEDURE — 99999 PR PBB SHADOW E&M-EST. PATIENT-LVL III: ICD-10-PCS | Mod: PBBFAC,HCNC,, | Performed by: OPHTHALMOLOGY

## 2020-06-04 PROCEDURE — 92014 COMPRE OPH EXAM EST PT 1/>: CPT | Mod: HCNC,S$GLB,, | Performed by: OPHTHALMOLOGY

## 2020-06-04 PROCEDURE — 92202 OPSCPY EXTND ON/MAC DRAW: CPT | Mod: HCNC,S$GLB,, | Performed by: OPHTHALMOLOGY

## 2020-06-04 PROCEDURE — 99999 PR PBB SHADOW E&M-EST. PATIENT-LVL III: CPT | Mod: PBBFAC,HCNC,, | Performed by: OPHTHALMOLOGY

## 2020-06-04 PROCEDURE — 92014 PR EYE EXAM, EST PATIENT,COMPREHESV: ICD-10-PCS | Mod: HCNC,S$GLB,, | Performed by: OPHTHALMOLOGY

## 2020-06-04 PROCEDURE — 92202 PR OPHTHALMOSCOPY, EXT, W/DRAW OPTIC NERVE/MACULA, I&R, UNI/BI: ICD-10-PCS | Mod: HCNC,S$GLB,, | Performed by: OPHTHALMOLOGY

## 2020-06-04 PROCEDURE — 92134 POSTERIOR SEGMENT OCT RETINA (OCULAR COHERENCE TOMOGRAPHY)-BOTH EYES: ICD-10-PCS | Mod: HCNC,S$GLB,, | Performed by: OPHTHALMOLOGY

## 2020-06-04 NOTE — PROGRESS NOTES
HPI     8 wk OCT  DLS: 01/02/20 Dr. Arechiga     Patient states vision seems stable no change.Sometimes vision fluctuates,   Denies pain.    No gtts         OCT - OD No ME  OS - central cystoid edema - slightly improved    Prior FA - Foveal MA's OS   No NV of NP OU      A/P    1. 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 DME OS  S/p Avastin OS x 5  S/p Ozurdex OS x 5 9/19  S/p Iluvien 9/19    Doing well, will need chronic steroid    Stable today - observe      3. HTN Ret OU  BS/BP/chol control    4. PCIOL OU  With small PCO      4 months OCT

## 2020-06-05 DIAGNOSIS — I10 ESSENTIAL HYPERTENSION: ICD-10-CM

## 2020-06-05 DIAGNOSIS — E78.2 MIXED HYPERLIPIDEMIA: ICD-10-CM

## 2020-06-05 DIAGNOSIS — I42.8 NONISCHEMIC CARDIOMYOPATHY: ICD-10-CM

## 2020-06-05 RX ORDER — PRAVASTATIN SODIUM 40 MG/1
TABLET ORAL
Qty: 90 TABLET | Refills: 3 | Status: SHIPPED | OUTPATIENT
Start: 2020-06-05 | End: 2021-06-02 | Stop reason: SDUPTHER

## 2020-06-05 RX ORDER — IRBESARTAN 300 MG/1
300 TABLET ORAL NIGHTLY
Qty: 90 TABLET | Refills: 3 | Status: SHIPPED | OUTPATIENT
Start: 2020-06-05 | End: 2021-06-21 | Stop reason: SDUPTHER

## 2020-06-19 ENCOUNTER — TELEPHONE (OUTPATIENT)
Dept: INTERNAL MEDICINE | Facility: CLINIC | Age: 80
End: 2020-06-19

## 2020-06-19 ENCOUNTER — HOSPITAL ENCOUNTER (OUTPATIENT)
Dept: RADIOLOGY | Facility: HOSPITAL | Age: 80
Discharge: HOME | End: 2020-06-19
Attending: INTERNAL MEDICINE
Payer: MEDICARE

## 2020-06-19 DIAGNOSIS — J90 PLEURAL EFFUSION: ICD-10-CM

## 2020-06-19 PROCEDURE — 71046 XR CHEST PA AND LATERAL: ICD-10-PCS | Mod: 26,HCNC,, | Performed by: RADIOLOGY

## 2020-06-19 PROCEDURE — 71046 X-RAY EXAM CHEST 2 VIEWS: CPT | Mod: TC,HCNC

## 2020-06-19 PROCEDURE — 71046 X-RAY EXAM CHEST 2 VIEWS: CPT | Mod: 26,HCNC,, | Performed by: RADIOLOGY

## 2020-06-29 ENCOUNTER — OFFICE VISIT (OUTPATIENT)
Dept: CARDIOLOGY | Facility: CLINIC | Age: 80
End: 2020-06-29
Payer: MEDICARE

## 2020-06-29 ENCOUNTER — LAB VISIT (OUTPATIENT)
Dept: LAB | Facility: HOSPITAL | Age: 80
End: 2020-06-29
Attending: INTERNAL MEDICINE
Payer: MEDICARE

## 2020-06-29 VITALS
OXYGEN SATURATION: 96 % | HEART RATE: 92 BPM | BODY MASS INDEX: 29.49 KG/M2 | WEIGHT: 166.44 LBS | DIASTOLIC BLOOD PRESSURE: 60 MMHG | HEIGHT: 63 IN | SYSTOLIC BLOOD PRESSURE: 120 MMHG

## 2020-06-29 DIAGNOSIS — I10 ESSENTIAL HYPERTENSION: ICD-10-CM

## 2020-06-29 DIAGNOSIS — I42.8 NONISCHEMIC CARDIOMYOPATHY: ICD-10-CM

## 2020-06-29 DIAGNOSIS — N18.30 CHRONIC KIDNEY DISEASE, STAGE 3, MOD DECREASED GFR: ICD-10-CM

## 2020-06-29 DIAGNOSIS — E78.5 HYPERLIPIDEMIA, UNSPECIFIED HYPERLIPIDEMIA TYPE: ICD-10-CM

## 2020-06-29 DIAGNOSIS — I42.8 NONISCHEMIC CARDIOMYOPATHY: Primary | ICD-10-CM

## 2020-06-29 DIAGNOSIS — G47.33 OSA (OBSTRUCTIVE SLEEP APNEA): ICD-10-CM

## 2020-06-29 DIAGNOSIS — Z95.810 BIVENTRICULAR ICD (IMPLANTABLE CARDIOVERTER-DEFIBRILLATOR) IN PLACE: ICD-10-CM

## 2020-06-29 LAB — SARS-COV-2 IGG SERPLBLD QL IA.RAPID: NEGATIVE

## 2020-06-29 PROCEDURE — 1126F PR PAIN SEVERITY QUANTIFIED, NO PAIN PRESENT: ICD-10-PCS | Mod: HCNC,S$GLB,, | Performed by: INTERNAL MEDICINE

## 2020-06-29 PROCEDURE — 1100F PR PT FALLS ASSESS DOC 2+ FALLS/FALL W/INJURY/YR: ICD-10-PCS | Mod: HCNC,CPTII,S$GLB, | Performed by: INTERNAL MEDICINE

## 2020-06-29 PROCEDURE — 3288F PR FALLS RISK ASSESSMENT DOCUMENTED: ICD-10-PCS | Mod: HCNC,CPTII,S$GLB, | Performed by: INTERNAL MEDICINE

## 2020-06-29 PROCEDURE — 3078F DIAST BP <80 MM HG: CPT | Mod: HCNC,CPTII,S$GLB, | Performed by: INTERNAL MEDICINE

## 2020-06-29 PROCEDURE — 3074F PR MOST RECENT SYSTOLIC BLOOD PRESSURE < 130 MM HG: ICD-10-PCS | Mod: HCNC,CPTII,S$GLB, | Performed by: INTERNAL MEDICINE

## 2020-06-29 PROCEDURE — 1126F AMNT PAIN NOTED NONE PRSNT: CPT | Mod: HCNC,S$GLB,, | Performed by: INTERNAL MEDICINE

## 2020-06-29 PROCEDURE — 3078F PR MOST RECENT DIASTOLIC BLOOD PRESSURE < 80 MM HG: ICD-10-PCS | Mod: HCNC,CPTII,S$GLB, | Performed by: INTERNAL MEDICINE

## 2020-06-29 PROCEDURE — 99499 RISK ADDL DX/OHS AUDIT: ICD-10-PCS | Mod: HCNC,S$GLB,, | Performed by: INTERNAL MEDICINE

## 2020-06-29 PROCEDURE — 1159F MED LIST DOCD IN RCRD: CPT | Mod: HCNC,S$GLB,, | Performed by: INTERNAL MEDICINE

## 2020-06-29 PROCEDURE — 1100F PTFALLS ASSESS-DOCD GE2>/YR: CPT | Mod: HCNC,CPTII,S$GLB, | Performed by: INTERNAL MEDICINE

## 2020-06-29 PROCEDURE — 99214 OFFICE O/P EST MOD 30 MIN: CPT | Mod: HCNC,S$GLB,, | Performed by: INTERNAL MEDICINE

## 2020-06-29 PROCEDURE — 1159F PR MEDICATION LIST DOCUMENTED IN MEDICAL RECORD: ICD-10-PCS | Mod: HCNC,S$GLB,, | Performed by: INTERNAL MEDICINE

## 2020-06-29 PROCEDURE — 86769 SARS-COV-2 COVID-19 ANTIBODY: CPT | Mod: HCNC

## 2020-06-29 PROCEDURE — 99999 PR PBB SHADOW E&M-EST. PATIENT-LVL V: CPT | Mod: PBBFAC,HCNC,, | Performed by: INTERNAL MEDICINE

## 2020-06-29 PROCEDURE — 99999 PR PBB SHADOW E&M-EST. PATIENT-LVL V: ICD-10-PCS | Mod: PBBFAC,HCNC,, | Performed by: INTERNAL MEDICINE

## 2020-06-29 PROCEDURE — 36415 COLL VENOUS BLD VENIPUNCTURE: CPT | Mod: HCNC

## 2020-06-29 PROCEDURE — 99499 UNLISTED E&M SERVICE: CPT | Mod: HCNC,S$GLB,, | Performed by: INTERNAL MEDICINE

## 2020-06-29 PROCEDURE — 99214 PR OFFICE/OUTPT VISIT, EST, LEVL IV, 30-39 MIN: ICD-10-PCS | Mod: HCNC,S$GLB,, | Performed by: INTERNAL MEDICINE

## 2020-06-29 PROCEDURE — 3074F SYST BP LT 130 MM HG: CPT | Mod: HCNC,CPTII,S$GLB, | Performed by: INTERNAL MEDICINE

## 2020-06-29 PROCEDURE — 3288F FALL RISK ASSESSMENT DOCD: CPT | Mod: HCNC,CPTII,S$GLB, | Performed by: INTERNAL MEDICINE

## 2020-06-29 NOTE — PROGRESS NOTES
Subjective:   Patient ID:  Myesha Quinones is a 80 y.o. female who presents for follow-up of Congestive Heart Failure      HPI:   Myesha Quinones presents for follow up of a non-ischemic cardiomyopathy with EF normalizing post CRT.Myesha Quinones is not exercising. States she will become SOB if she walks a 1/2 block and has noted some ankle swelling. Myesha Quinones has hypertension on the Digital Monitoring program. BP readings are variable and admits to varying amounts of sodium intake. Has been taking Carvedilol 25 mg twice daily instead of 50. Myesha Quinones denies chest pain,  palpitations, presyncope , or syncope. Myesha Quinones has dyslipidemia  on moderate intensity statin therapy. Myesha Quinones chronic kidney disease with a pre renal component last checks...    Review of Systems   Constitution: Negative for malaise/fatigue, weight gain and weight loss.   Eyes: Negative for blurred vision.   Cardiovascular: Positive for dyspnea on exertion and leg swelling. Negative for chest pain, claudication, cyanosis, irregular heartbeat, near-syncope, orthopnea, palpitations, paroxysmal nocturnal dyspnea and syncope.   Respiratory: Negative for cough, shortness of breath and wheezing.         KHOA on CPAP    Musculoskeletal: Negative for falls and myalgias.   Gastrointestinal: Negative for abdominal pain, heartburn, nausea and vomiting.   Genitourinary: Negative for nocturia.   Neurological: Negative for brief paralysis, dizziness, focal weakness, headaches, numbness, paresthesias and weakness.   Psychiatric/Behavioral: Negative for altered mental status.       Current Outpatient Medications   Medication Sig    acetaminophen (TYLENOL) 500 MG tablet Take 1,000 mg by mouth daily as needed for Pain.    albuterol-ipratropium (DUO-NEB) 2.5 mg-0.5 mg/3 mL nebulizer solution TAKE 1 VIAL BY NEBULIZATION EVERY 4 (FOUR) HOURS. RESCUE    aspirin (ENTERIC COATED ASPIRIN) 81 MG EC tablet Take  1 tablet by mouth once daily.     benzonatate (TESSALON PERLES) 100 MG capsule Take 2 capsules (200 mg total) by mouth 3 (three) times daily as needed for Cough.    blood sugar diagnostic (ACCU-CHEK HECTOR) Strp Uses Accu-Check Hector meter to test BG 4x/day    carvediloL (COREG) 25 MG tablet TAKE 2 TABLETS (50 MG TOTAL) BY MOUTH 2 (TWO) TIMES DAILY.    chlorthalidone (HYGROTEN) 25 MG Tab Take 0.5 tablets (12.5 mg total) by mouth once daily.    cholecalciferol, vitamin D3, (VITAMIN D3) 2,000 unit Tab Take 1 tablet by mouth once daily.     cloNIDine 0.1 mg/24 hr td ptwk (CATAPRES) 0.1 mg/24 hr PLACE 1 PATCH ONTO THE SKIN EVERY 7 DAYS.    CYANOCOBALAMIN, VITAMIN B-12, (B-12 DOTS ORAL) Take 1 tablet by mouth once daily.    diltiaZEM (CARDIZEM CD) 120 MG Cp24 TAKE 1 CAPSULE (120 MG TOTAL) BY MOUTH ONCE DAILY.    fluticasone (FLONASE) 50 mcg/actuation nasal spray 2 sprays (100 mcg total) by Each Nare route once daily.    fluticasone-salmeterol diskus inhaler 250-50 mcg Inhale 1 puff into the lungs 2 (two) times daily. This is a change from one month    hydrALAZINE (APRESOLINE) 100 MG tablet Take 1 tablet (100 mg total) by mouth 3 (three) times daily.    insulin (LANTUS SOLOSTAR U-100 INSULIN) glargine 100 units/mL (3mL) SubQ pen Inject 34 Units into the skin every evening.    irbesartan (AVAPRO) 300 MG tablet Take 1 tablet (300 mg total) by mouth every evening.    lancets (ACCU-CHEK SOFTCLIX LANCETS) Misc Uses Accu-Chek Hector meter to test BG 4x/day (Patient taking differently: Uses Accu-Chek Hector meter to test BG 1x/day)    levocetirizine (XYZAL) 5 MG tablet Take 1 tablet (5 mg total) by mouth every evening.    lidocaine (LIDODERM) 5 % Place 1 patch onto the skin once daily. Place patch to right back. Leave on for 12 hours and remove for 12 hours.    linaGLIPtin (TRADJENTA) 5 mg Tab tablet Take 1 tablet (5 mg total) by mouth once daily.    magnesium oxide (MAG-OX) 400 mg tablet Take 1 tablet (400 mg  "total) by mouth once daily.    meclizine (ANTIVERT) 12.5 mg tablet Take 1 tablet (12.5 mg total) by mouth 3 (three) times daily as needed for dizziness    metFORMIN (GLUCOPHAGE) 500 MG tablet Take 1 tablet (500 mg total) by mouth 2 (two) times daily with meals.    nitroGLYCERIN (NITROSTAT) 0.4 MG SL tablet Place 0.4 mg under the tongue every 5 (five) minutes as needed for Chest pain.     omega-3 fatty acids (FISH OIL) 500 mg Cap Take 1 capsule by mouth Twice daily.    ondansetron (ZOFRAN-ODT) 4 MG TbDL Take 1 tablet (4 mg total) by mouth every 6 (six) hours as needed.    pen needle, diabetic (BD ULTRA-FINE MINI PEN NEEDLE) 31 gauge x 3/16" Ndle USE WITH LANTUS AT BEDTIME    pravastatin (PRAVACHOL) 40 MG tablet TAKE 1 TABLET BY MOUTH EVERY DAY    predniSONE (DELTASONE) 20 MG tablet Take 1 tablet (20 mg total) by mouth once daily.    VENTOLIN HFA 90 mcg/actuation inhaler INHALE 2 PUFFS INTO THE LUNGS EVERY 6 (SIX) HOURS AS NEEDED FOR WHEEZING. RESCUE    traMADol (ULTRAM) 50 mg tablet Take 1 tablet (50 mg total) by mouth nightly as needed for Pain. New right thoracic apin (Patient not taking: Reported on 6/29/2020)     No current facility-administered medications for this visit.      Facility-Administered Medications Ordered in Other Visits   Medication    0.9%  NaCl infusion     Objective:   Physical Exam   Constitutional: She is oriented to person, place, and time. She appears well-developed. No distress.   /60   Pulse 92   Ht 5' 3" (1.6 m)   Wt 75.5 kg (166 lb 7.2 oz)   LMP  (LMP Unknown)   SpO2 96%   BMI 29.48 kg/m²    HENT:   Head: Normocephalic.   Eyes: Pupils are equal, round, and reactive to light. Conjunctivae are normal. No scleral icterus.   Neck: Neck supple. No JVD present. No thyromegaly present.   Cardiovascular: Normal rate, regular rhythm, normal heart sounds and intact distal pulses. PMI is not displaced. Exam reveals no gallop and no friction rub.   No murmur heard.  1+ " bilateral ankle edema   Pulmonary/Chest: Effort normal. No respiratory distress. She has no wheezes. She has rales.   Right mastectomy      Rales right base   Abdominal: Soft. She exhibits no distension. There is no splenomegaly or hepatomegaly. There is no abdominal tenderness.   Musculoskeletal:         General: No tenderness or edema.      Comments: Right arm lymphedema   Neurological: She is alert and oriented to person, place, and time.   Skin: Skin is warm and dry. She is not diaphoretic.   Psychiatric: She has a normal mood and affect. Her behavior is normal.       Lab Results   Component Value Date     03/13/2020    K 4.7 03/13/2020     03/13/2020    CO2 26 03/13/2020    BUN 52 (H) 03/13/2020    CREATININE 1.5 (H) 03/13/2020    GLU 99 03/13/2020    HGBA1C 7.1 (H) 02/13/2020    MG 1.8 12/21/2019    AST 23 02/13/2020    ALT 19 02/13/2020    ALBUMIN 3.4 (L) 02/13/2020    PROT 7.3 02/13/2020    BILITOT 0.2 02/13/2020    WBC 8.63 01/17/2020    WBC 8.63 01/17/2020    HGB 10.1 (L) 01/17/2020    HGB 10.1 (L) 01/17/2020    HCT 33.2 (L) 01/17/2020    HCT 33.2 (L) 01/17/2020    MCV 94 01/17/2020    MCV 94 01/17/2020     01/17/2020     01/17/2020    TSH 0.447 01/17/2020    CHOL 131 01/17/2020    HDL 42 01/17/2020    LDLCALC 58.6 (L) 01/17/2020    TRIG 152 (H) 01/17/2020       Assessment:     1. Nonischemic cardiomyopathy : last Ef normalized S/P #3   2. Essential hypertension: Varying readings possibly related to sodium intake    3. Biventricular ICD (implantable cardioverter-defibrillator) in place    4. Chronic kidney disease, stage 3, mod decreased GFR: Pre renal component    5. Hyperlipidemia, unspecified hyperlipidemia type:  on moderate intensity statin therapy.    6. KHOA (obstructive sleep apnea)        Plan:     Myesha was seen today for congestive heart failure.    Diagnoses and all orders for this visit:    Nonischemic cardiomyopathy  -     Echo Color Flow Doppler? Yes; Future  -      COVID-19 (SARS CoV-2) IgG Antibody; Future; Expected date: 06/29/2020    Essential hypertension  Will increase carvedilol to 50 mg twice daily  Biventricular ICD (implantable cardioverter-defibrillator) in place  Per EP  Chronic kidney disease, stage 3, mod decreased GFR    Hyperlipidemia, unspecified hyperlipidemia type  Continue current regimen  Graded exercise for 30 minutes a day at least 5 days a week suggested.  KHOA (obstructive sleep apnea)

## 2020-06-29 NOTE — PATIENT INSTRUCTIONS
Increase Carvedilol to 2 tablets twice a day  Graded exercise for 30 minutes a day at least 5 days a week suggested.  Watch sodium ( salt ) intake

## 2020-07-02 ENCOUNTER — OFFICE VISIT (OUTPATIENT)
Dept: SLEEP MEDICINE | Facility: CLINIC | Age: 80
End: 2020-07-02
Payer: MEDICARE

## 2020-07-02 VITALS
HEIGHT: 63 IN | HEART RATE: 83 BPM | BODY MASS INDEX: 29.23 KG/M2 | WEIGHT: 165 LBS | SYSTOLIC BLOOD PRESSURE: 157 MMHG | DIASTOLIC BLOOD PRESSURE: 69 MMHG

## 2020-07-02 DIAGNOSIS — I42.8 NONISCHEMIC CARDIOMYOPATHY: ICD-10-CM

## 2020-07-02 DIAGNOSIS — E11.3213 CONTROLLED TYPE 2 DIABETES MELLITUS WITH BOTH EYES AFFECTED BY MILD NONPROLIFERATIVE RETINOPATHY AND MACULAR EDEMA, WITH LONG-TERM CURRENT USE OF INSULIN: ICD-10-CM

## 2020-07-02 DIAGNOSIS — G47.33 OSA (OBSTRUCTIVE SLEEP APNEA): ICD-10-CM

## 2020-07-02 DIAGNOSIS — I10 ESSENTIAL HYPERTENSION: ICD-10-CM

## 2020-07-02 DIAGNOSIS — J44.9 CHRONIC OBSTRUCTIVE PULMONARY DISEASE, UNSPECIFIED COPD TYPE: Primary | ICD-10-CM

## 2020-07-02 DIAGNOSIS — Z79.4 CONTROLLED TYPE 2 DIABETES MELLITUS WITH BOTH EYES AFFECTED BY MILD NONPROLIFERATIVE RETINOPATHY AND MACULAR EDEMA, WITH LONG-TERM CURRENT USE OF INSULIN: ICD-10-CM

## 2020-07-02 PROCEDURE — 3288F FALL RISK ASSESSMENT DOCD: CPT | Mod: HCNC,CPTII,S$GLB, | Performed by: NURSE PRACTITIONER

## 2020-07-02 PROCEDURE — 3051F PR MOST RECENT HEMOGLOBIN A1C LEVEL 7.0 - < 8.0%: ICD-10-PCS | Mod: HCNC,CPTII,S$GLB, | Performed by: NURSE PRACTITIONER

## 2020-07-02 PROCEDURE — 3288F PR FALLS RISK ASSESSMENT DOCUMENTED: ICD-10-PCS | Mod: HCNC,CPTII,S$GLB, | Performed by: NURSE PRACTITIONER

## 2020-07-02 PROCEDURE — 1126F PR PAIN SEVERITY QUANTIFIED, NO PAIN PRESENT: ICD-10-PCS | Mod: HCNC,S$GLB,, | Performed by: NURSE PRACTITIONER

## 2020-07-02 PROCEDURE — 3051F HG A1C>EQUAL 7.0%<8.0%: CPT | Mod: HCNC,CPTII,S$GLB, | Performed by: NURSE PRACTITIONER

## 2020-07-02 PROCEDURE — 99214 OFFICE O/P EST MOD 30 MIN: CPT | Mod: HCNC,S$GLB,, | Performed by: NURSE PRACTITIONER

## 2020-07-02 PROCEDURE — 99999 PR PBB SHADOW E&M-EST. PATIENT-LVL III: ICD-10-PCS | Mod: PBBFAC,HCNC,, | Performed by: NURSE PRACTITIONER

## 2020-07-02 PROCEDURE — 1159F PR MEDICATION LIST DOCUMENTED IN MEDICAL RECORD: ICD-10-PCS | Mod: HCNC,S$GLB,, | Performed by: NURSE PRACTITIONER

## 2020-07-02 PROCEDURE — 3078F DIAST BP <80 MM HG: CPT | Mod: HCNC,CPTII,S$GLB, | Performed by: NURSE PRACTITIONER

## 2020-07-02 PROCEDURE — 1126F AMNT PAIN NOTED NONE PRSNT: CPT | Mod: HCNC,S$GLB,, | Performed by: NURSE PRACTITIONER

## 2020-07-02 PROCEDURE — 3077F SYST BP >= 140 MM HG: CPT | Mod: HCNC,CPTII,S$GLB, | Performed by: NURSE PRACTITIONER

## 2020-07-02 PROCEDURE — 1100F PR PT FALLS ASSESS DOC 2+ FALLS/FALL W/INJURY/YR: ICD-10-PCS | Mod: HCNC,CPTII,S$GLB, | Performed by: NURSE PRACTITIONER

## 2020-07-02 PROCEDURE — 1159F MED LIST DOCD IN RCRD: CPT | Mod: HCNC,S$GLB,, | Performed by: NURSE PRACTITIONER

## 2020-07-02 PROCEDURE — 3078F PR MOST RECENT DIASTOLIC BLOOD PRESSURE < 80 MM HG: ICD-10-PCS | Mod: HCNC,CPTII,S$GLB, | Performed by: NURSE PRACTITIONER

## 2020-07-02 PROCEDURE — 99999 PR PBB SHADOW E&M-EST. PATIENT-LVL III: CPT | Mod: PBBFAC,HCNC,, | Performed by: NURSE PRACTITIONER

## 2020-07-02 PROCEDURE — 3077F PR MOST RECENT SYSTOLIC BLOOD PRESSURE >= 140 MM HG: ICD-10-PCS | Mod: HCNC,CPTII,S$GLB, | Performed by: NURSE PRACTITIONER

## 2020-07-02 PROCEDURE — 99214 PR OFFICE/OUTPT VISIT, EST, LEVL IV, 30-39 MIN: ICD-10-PCS | Mod: HCNC,S$GLB,, | Performed by: NURSE PRACTITIONER

## 2020-07-02 PROCEDURE — 1100F PTFALLS ASSESS-DOCD GE2>/YR: CPT | Mod: HCNC,CPTII,S$GLB, | Performed by: NURSE PRACTITIONER

## 2020-07-02 NOTE — PROGRESS NOTES
This 80 y.o. female returns for mgt of KHOA    interrrogation- avg use 7.3h/night. black on water gasket, 90% tile 10.4cm. AHI 0.2, 100% mask fit. 0% PB  Has upcoming echo, sees cards. She continues to use nightly apap 9-13cm. Can't sleep w/o machine.using Eson mask w/chin strap. Wakes from sleep a lot /arouses but returns quickly to sleep. Denies overt mask leaks. Snoring resolved. Sleep remains more consolidated. Getting regular supplies from [facility]. In digial bp monitoring program  bp stable  Already taking mag 400mg qd      BASELINE SLEEP STUDY 6/09: AHI 23.7, worse in supine, oxygen abi 77%. (168#)    Past Medical History:   Diagnosis Date    Acute hypoxemic respiratory failure 12/19/2019    Acute right-sided thoracic back pain 12/9/2019    Allergy     Asthma     Basal cell carcinoma     left forehead    Basal cell carcinoma     left nose    Basal cell carcinoma 05/20/2015    right nose    Basal cell carcinoma 12/22/2015    left lower post neck    Breast cancer     CAD (coronary artery disease)     Cardiomyopathy     Cardiomyopathy, ischemic     Cataract     CHF (congestive heart failure)     Chronic kidney disease, stage 3, mod decreased GFR 2/14/2017    Colon polyp 2011    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, with long-term current use of insulin 2/22/2018    COPD (chronic obstructive pulmonary disease)     COPD exacerbation 4/8/2018    Defibrillator discharge     Diabetes mellitus     Diabetes mellitus type II     Diabetes with neurologic complications     Goiter     MNG    HX: breast cancer     Hyperlipidemia     Hypertension     Iron deficiency anemia 5/16/2017    Left kidney mass     Meningioma     Osteoporosis, postmenopausal     Pacemaker     Pneumonia 12/8/2019    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  Symptoms improved overall/benefiting. Excellent adherence, AHI<5  She has medical comorbidities of non-ischemic cardiomyopathy, CAD (has ICD), asthma , pulmonary hypertension, DM with CKD and long term use insulin      PLAN:   1. Continue apap 9-13cm. [facility] supplies. RTC 1 yr, sooner if neederd  2. Continue to see cards re: CAD/cardiomyopathy/continue meds and pulmonary hypertension/continue meds  3. Discussed effectiveness of her therapy and potential ramifications of untreated KHOA, including heart disease, hypertension, cognitive difficulties, stroke, and diabetes.  See PCP /monitoring program for DM and HTN mgt/continue meds  4. Consider trazadone 25mg qhs prn if Mag taken bedtime ineffective. Low dose MLT 5-10mg also ok  "

## 2020-07-06 ENCOUNTER — HOSPITAL ENCOUNTER (OUTPATIENT)
Dept: CARDIOLOGY | Facility: HOSPITAL | Age: 80
Discharge: HOME | End: 2020-07-06
Attending: INTERNAL MEDICINE
Payer: MEDICARE

## 2020-07-06 VITALS
HEART RATE: 88 BPM | HEIGHT: 63 IN | SYSTOLIC BLOOD PRESSURE: 160 MMHG | BODY MASS INDEX: 29.41 KG/M2 | WEIGHT: 166 LBS | DIASTOLIC BLOOD PRESSURE: 82 MMHG

## 2020-07-06 DIAGNOSIS — I42.8 NONISCHEMIC CARDIOMYOPATHY: ICD-10-CM

## 2020-07-06 LAB
ASCENDING AORTA: 2.54 CM
AV INDEX (PROSTH): 0.65
AV MEAN GRADIENT: 4 MMHG
AV PEAK GRADIENT: 6 MMHG
AV VALVE AREA: 2.38 CM2
AV VELOCITY RATIO: 0.64
BSA FOR ECHO PROCEDURE: 1.83 M2
CV ECHO LV RWT: 0.44 CM
DOP CALC AO PEAK VEL: 1.26 M/S
DOP CALC AO VTI: 30.35 CM
DOP CALC LVOT AREA: 3.6 CM2
DOP CALC LVOT DIAMETER: 2.15 CM
DOP CALC LVOT PEAK VEL: 0.81 M/S
DOP CALC LVOT STROKE VOLUME: 72.1 CM3
DOP CALCLVOT PEAK VEL VTI: 19.87 CM
E WAVE DECELERATION TIME: 224.23 MSEC
E/A RATIO: 1.45
E/E' RATIO: 16.4 M/S
ECHO LV POSTERIOR WALL: 0.94 CM (ref 0.6–1.1)
FRACTIONAL SHORTENING: 26 % (ref 28–44)
INTERVENTRICULAR SEPTUM: 0.87 CM (ref 0.6–1.1)
IVRT: 79.92 MSEC
LA MAJOR: 5.26 CM
LA MINOR: 5.26 CM
LA WIDTH: 4.48 CM
LEFT ATRIUM SIZE: 3.97 CM
LEFT ATRIUM VOLUME INDEX: 44.5 ML/M2
LEFT ATRIUM VOLUME: 79.52 CM3
LEFT INTERNAL DIMENSION IN SYSTOLE: 3.15 CM (ref 2.1–4)
LEFT VENTRICLE DIASTOLIC VOLUME INDEX: 45.67 ML/M2
LEFT VENTRICLE DIASTOLIC VOLUME: 81.6 ML
LEFT VENTRICLE MASS INDEX: 69 G/M2
LEFT VENTRICLE SYSTOLIC VOLUME INDEX: 22.1 ML/M2
LEFT VENTRICLE SYSTOLIC VOLUME: 39.44 ML
LEFT VENTRICULAR INTERNAL DIMENSION IN DIASTOLE: 4.27 CM (ref 3.5–6)
LEFT VENTRICULAR MASS: 122.82 G
LV LATERAL E/E' RATIO: 15.38 M/S
LV SEPTAL E/E' RATIO: 17.57 M/S
MV PEAK A VEL: 0.85 M/S
MV PEAK E VEL: 1.23 M/S
MV STENOSIS PRESSURE HALF TIME: 65.03 MS
MV VALVE AREA P 1/2 METHOD: 3.38 CM2
PISA TR MAX VEL: 3.6 M/S
RA MAJOR: 4.82 CM
RA PRESSURE: 8 MMHG
RA WIDTH: 4.38 CM
RETIRED EF AND QEF - SEE NOTES: 50 %
RIGHT VENTRICULAR END-DIASTOLIC DIMENSION: 3.59 CM
RV TISSUE DOPPLER FREE WALL SYSTOLIC VELOCITY 1 (APICAL 4 CHAMBER VIEW): 11.49 CM/S
SINUS: 2.59 CM
STJ: 2.49 CM
TDI LATERAL: 0.08 M/S
TDI SEPTAL: 0.07 M/S
TDI: 0.08 M/S
TR MAX PG: 52 MMHG
TRICUSPID ANNULAR PLANE SYSTOLIC EXCURSION: 2.34 CM
TV REST PULMONARY ARTERY PRESSURE: 60 MMHG

## 2020-07-06 PROCEDURE — 93306 ECHO (CUPID ONLY): ICD-10-PCS | Mod: 26,HCNC,, | Performed by: INTERNAL MEDICINE

## 2020-07-06 PROCEDURE — 93306 TTE W/DOPPLER COMPLETE: CPT | Mod: HCNC

## 2020-07-06 PROCEDURE — 93306 TTE W/DOPPLER COMPLETE: CPT | Mod: 26,HCNC,, | Performed by: INTERNAL MEDICINE

## 2020-07-15 ENCOUNTER — TELEPHONE (OUTPATIENT)
Dept: OTOLARYNGOLOGY | Facility: CLINIC | Age: 80
End: 2020-07-15

## 2020-07-15 NOTE — TELEPHONE ENCOUNTER
----- Message from Braden Martinez sent at 7/14/2020 11:47 AM CDT -----  Contact: patient// [phone]  [facility]         Name of Caller:    PATIENT    Reason for Visit/Symptoms: EAR WAX     Best Contact Number or Confirm if Mychart Preferred: [phone]  Preferred Date/Time of Appointment: TOMORROW 7-  Interested in Virtual Visit: NO  Additional Information:NONE

## 2020-07-15 NOTE — TELEPHONE ENCOUNTER
Spoke with patient scheduled an appointment due to cerumen removal on 7/20 at 9:20 with Dr Miranda and 10 AM with audio. Patient notified of both date and times of appt

## 2020-07-16 ENCOUNTER — PES CALL (OUTPATIENT)
Dept: ADMINISTRATIVE | Facility: CLINIC | Age: 80
End: 2020-07-16

## 2020-07-17 ENCOUNTER — LAB VISIT (OUTPATIENT)
Dept: LAB | Facility: HOSPITAL | Age: 80
End: 2020-07-17
Attending: INTERNAL MEDICINE
Payer: MEDICARE

## 2020-07-17 DIAGNOSIS — N18.30 TYPE 2 DIABETES MELLITUS WITH STAGE 3 CHRONIC KIDNEY DISEASE, WITH LONG-TERM CURRENT USE OF INSULIN: ICD-10-CM

## 2020-07-17 DIAGNOSIS — E11.22 TYPE 2 DIABETES MELLITUS WITH STAGE 3 CHRONIC KIDNEY DISEASE, WITH LONG-TERM CURRENT USE OF INSULIN: ICD-10-CM

## 2020-07-17 DIAGNOSIS — Z79.4 TYPE 2 DIABETES MELLITUS WITH STAGE 3 CHRONIC KIDNEY DISEASE, WITH LONG-TERM CURRENT USE OF INSULIN: ICD-10-CM

## 2020-07-17 DIAGNOSIS — E55.9 MILD VITAMIN D DEFICIENCY: ICD-10-CM

## 2020-07-17 DIAGNOSIS — E78.5 HYPERLIPIDEMIA, UNSPECIFIED HYPERLIPIDEMIA TYPE: ICD-10-CM

## 2020-07-17 LAB
25(OH)D3+25(OH)D2 SERPL-MCNC: 31 NG/ML (ref 30–96)
ALBUMIN SERPL BCP-MCNC: 3.4 G/DL (ref 3.5–5.2)
ALP SERPL-CCNC: 84 U/L (ref 55–135)
ALT SERPL W/O P-5'-P-CCNC: 15 U/L (ref 10–44)
ANION GAP SERPL CALC-SCNC: 6 MMOL/L (ref 8–16)
AST SERPL-CCNC: 19 U/L (ref 10–40)
BASOPHILS # BLD AUTO: 0.07 K/UL (ref 0–0.2)
BASOPHILS NFR BLD: 0.9 % (ref 0–1.9)
BILIRUB SERPL-MCNC: 0.3 MG/DL (ref 0.1–1)
BUN SERPL-MCNC: 33 MG/DL (ref 8–23)
CALCIUM SERPL-MCNC: 9.5 MG/DL (ref 8.7–10.5)
CHLORIDE SERPL-SCNC: 105 MMOL/L (ref 95–110)
CHOLEST SERPL-MCNC: 124 MG/DL (ref 120–199)
CHOLEST/HDLC SERPL: 3.1 {RATIO} (ref 2–5)
CO2 SERPL-SCNC: 26 MMOL/L (ref 23–29)
CREAT SERPL-MCNC: 1.7 MG/DL (ref 0.5–1.4)
DIFFERENTIAL METHOD: ABNORMAL
EOSINOPHIL # BLD AUTO: 0.7 K/UL (ref 0–0.5)
EOSINOPHIL NFR BLD: 9 % (ref 0–8)
ERYTHROCYTE [DISTWIDTH] IN BLOOD BY AUTOMATED COUNT: 13 % (ref 11.5–14.5)
EST. GFR  (AFRICAN AMERICAN): 32.1 ML/MIN/1.73 M^2
EST. GFR  (NON AFRICAN AMERICAN): 27.9 ML/MIN/1.73 M^2
GLUCOSE SERPL-MCNC: 68 MG/DL (ref 70–110)
HCT VFR BLD AUTO: 31.9 % (ref 37–48.5)
HDLC SERPL-MCNC: 40 MG/DL (ref 40–75)
HDLC SERPL: 32.3 % (ref 20–50)
HGB BLD-MCNC: 9.9 G/DL (ref 12–16)
IMM GRANULOCYTES # BLD AUTO: 0.08 K/UL (ref 0–0.04)
IMM GRANULOCYTES NFR BLD AUTO: 1 % (ref 0–0.5)
LDLC SERPL CALC-MCNC: 59 MG/DL (ref 63–159)
LYMPHOCYTES # BLD AUTO: 1.7 K/UL (ref 1–4.8)
LYMPHOCYTES NFR BLD: 22.7 % (ref 18–48)
MCH RBC QN AUTO: 30.5 PG (ref 27–31)
MCHC RBC AUTO-ENTMCNC: 31 G/DL (ref 32–36)
MCV RBC AUTO: 98 FL (ref 82–98)
MONOCYTES # BLD AUTO: 0.6 K/UL (ref 0.3–1)
MONOCYTES NFR BLD: 8.4 % (ref 4–15)
NEUTROPHILS # BLD AUTO: 4.4 K/UL (ref 1.8–7.7)
NEUTROPHILS NFR BLD: 58 % (ref 38–73)
NONHDLC SERPL-MCNC: 84 MG/DL
NRBC BLD-RTO: 0 /100 WBC
PLATELET # BLD AUTO: 210 K/UL (ref 150–350)
PMV BLD AUTO: 11.9 FL (ref 9.2–12.9)
POTASSIUM SERPL-SCNC: 4.6 MMOL/L (ref 3.5–5.1)
PROT SERPL-MCNC: 7 G/DL (ref 6–8.4)
RBC # BLD AUTO: 3.25 M/UL (ref 4–5.4)
SODIUM SERPL-SCNC: 137 MMOL/L (ref 136–145)
TRIGL SERPL-MCNC: 125 MG/DL (ref 30–150)
WBC # BLD AUTO: 7.65 K/UL (ref 3.9–12.7)

## 2020-07-17 PROCEDURE — 82306 VITAMIN D 25 HYDROXY: CPT | Mod: HCNC

## 2020-07-17 PROCEDURE — 36415 COLL VENOUS BLD VENIPUNCTURE: CPT | Mod: HCNC,PO

## 2020-07-17 PROCEDURE — 85025 COMPLETE CBC W/AUTO DIFF WBC: CPT | Mod: HCNC

## 2020-07-17 PROCEDURE — 80053 COMPREHEN METABOLIC PANEL: CPT | Mod: HCNC

## 2020-07-17 PROCEDURE — 80061 LIPID PANEL: CPT | Mod: HCNC

## 2020-07-17 PROCEDURE — 83036 HEMOGLOBIN GLYCOSYLATED A1C: CPT | Mod: HCNC

## 2020-07-18 LAB
ESTIMATED AVG GLUCOSE: 171 MG/DL (ref 68–131)
HBA1C MFR BLD HPLC: 7.6 % (ref 4–5.6)

## 2020-07-20 ENCOUNTER — CLINICAL SUPPORT (OUTPATIENT)
Dept: OTOLARYNGOLOGY | Facility: CLINIC | Age: 80
End: 2020-07-20
Payer: MEDICARE

## 2020-07-20 ENCOUNTER — OFFICE VISIT (OUTPATIENT)
Dept: OTOLARYNGOLOGY | Facility: CLINIC | Age: 80
End: 2020-07-20
Payer: MEDICARE

## 2020-07-20 VITALS
BODY MASS INDEX: 29.61 KG/M2 | WEIGHT: 167.13 LBS | SYSTOLIC BLOOD PRESSURE: 136 MMHG | HEIGHT: 63 IN | HEART RATE: 95 BPM | DIASTOLIC BLOOD PRESSURE: 65 MMHG

## 2020-07-20 DIAGNOSIS — H90.A21 SENSORINEURAL HEARING LOSS (SNHL) OF RIGHT EAR WITH RESTRICTED HEARING OF LEFT EAR: ICD-10-CM

## 2020-07-20 DIAGNOSIS — H83.8X2 SUPERIOR SEMICIRCULAR CANAL DEHISCENCE OF LEFT EAR: ICD-10-CM

## 2020-07-20 DIAGNOSIS — H83.8X2 SUPERIOR SEMICIRCULAR CANAL DEHISCENCE OF LEFT EAR: Primary | ICD-10-CM

## 2020-07-20 DIAGNOSIS — H90.A32 MIXED CONDUCTIVE AND SENSORINEURAL HEARING LOSS OF LEFT EAR WITH RESTRICTED HEARING OF RIGHT EAR: Primary | ICD-10-CM

## 2020-07-20 DIAGNOSIS — H90.A32 MIXED CONDUCTIVE AND SENSORINEURAL HEARING LOSS OF LEFT EAR WITH RESTRICTED HEARING OF RIGHT EAR: ICD-10-CM

## 2020-07-20 DIAGNOSIS — H61.23 BILATERAL IMPACTED CERUMEN: ICD-10-CM

## 2020-07-20 DIAGNOSIS — J30.89 NON-SEASONAL ALLERGIC RHINITIS, UNSPECIFIED TRIGGER: ICD-10-CM

## 2020-07-20 PROCEDURE — 1159F PR MEDICATION LIST DOCUMENTED IN MEDICAL RECORD: ICD-10-PCS | Mod: HCNC,S$GLB,, | Performed by: OTOLARYNGOLOGY

## 2020-07-20 PROCEDURE — 3078F DIAST BP <80 MM HG: CPT | Mod: HCNC,CPTII,S$GLB, | Performed by: OTOLARYNGOLOGY

## 2020-07-20 PROCEDURE — 3078F PR MOST RECENT DIASTOLIC BLOOD PRESSURE < 80 MM HG: ICD-10-PCS | Mod: HCNC,CPTII,S$GLB, | Performed by: OTOLARYNGOLOGY

## 2020-07-20 PROCEDURE — 1101F PT FALLS ASSESS-DOCD LE1/YR: CPT | Mod: HCNC,CPTII,S$GLB, | Performed by: OTOLARYNGOLOGY

## 2020-07-20 PROCEDURE — 92567 PR TYMPA2METRY: ICD-10-PCS | Mod: HCNC,S$GLB,, | Performed by: AUDIOLOGIST-HEARING AID FITTER

## 2020-07-20 PROCEDURE — 1101F PR PT FALLS ASSESS DOC 0-1 FALLS W/OUT INJ PAST YR: ICD-10-PCS | Mod: HCNC,CPTII,S$GLB, | Performed by: OTOLARYNGOLOGY

## 2020-07-20 PROCEDURE — 92567 TYMPANOMETRY: CPT | Mod: HCNC,S$GLB,, | Performed by: AUDIOLOGIST-HEARING AID FITTER

## 2020-07-20 PROCEDURE — 1126F AMNT PAIN NOTED NONE PRSNT: CPT | Mod: HCNC,S$GLB,, | Performed by: OTOLARYNGOLOGY

## 2020-07-20 PROCEDURE — 99214 OFFICE O/P EST MOD 30 MIN: CPT | Mod: 25,HCNC,S$GLB, | Performed by: OTOLARYNGOLOGY

## 2020-07-20 PROCEDURE — 99999 PR PBB SHADOW E&M-EST. PATIENT-LVL III: ICD-10-PCS | Mod: PBBFAC,HCNC,, | Performed by: OTOLARYNGOLOGY

## 2020-07-20 PROCEDURE — 99214 PR OFFICE/OUTPT VISIT, EST, LEVL IV, 30-39 MIN: ICD-10-PCS | Mod: 25,HCNC,S$GLB, | Performed by: OTOLARYNGOLOGY

## 2020-07-20 PROCEDURE — 1159F MED LIST DOCD IN RCRD: CPT | Mod: HCNC,S$GLB,, | Performed by: OTOLARYNGOLOGY

## 2020-07-20 PROCEDURE — 99999 PR PBB SHADOW E&M-EST. PATIENT-LVL I: CPT | Mod: PBBFAC,HCNC,, | Performed by: AUDIOLOGIST-HEARING AID FITTER

## 2020-07-20 PROCEDURE — 3075F PR MOST RECENT SYSTOLIC BLOOD PRESS GE 130-139MM HG: ICD-10-PCS | Mod: HCNC,CPTII,S$GLB, | Performed by: OTOLARYNGOLOGY

## 2020-07-20 PROCEDURE — 92557 PR COMPREHENSIVE HEARING TEST: ICD-10-PCS | Mod: HCNC,S$GLB,, | Performed by: AUDIOLOGIST-HEARING AID FITTER

## 2020-07-20 PROCEDURE — 99999 PR PBB SHADOW E&M-EST. PATIENT-LVL I: ICD-10-PCS | Mod: PBBFAC,HCNC,, | Performed by: AUDIOLOGIST-HEARING AID FITTER

## 2020-07-20 PROCEDURE — 99999 PR PBB SHADOW E&M-EST. PATIENT-LVL III: CPT | Mod: PBBFAC,HCNC,, | Performed by: OTOLARYNGOLOGY

## 2020-07-20 PROCEDURE — 1126F PR PAIN SEVERITY QUANTIFIED, NO PAIN PRESENT: ICD-10-PCS | Mod: HCNC,S$GLB,, | Performed by: OTOLARYNGOLOGY

## 2020-07-20 PROCEDURE — 3075F SYST BP GE 130 - 139MM HG: CPT | Mod: HCNC,CPTII,S$GLB, | Performed by: OTOLARYNGOLOGY

## 2020-07-20 PROCEDURE — 92557 COMPREHENSIVE HEARING TEST: CPT | Mod: HCNC,S$GLB,, | Performed by: AUDIOLOGIST-HEARING AID FITTER

## 2020-07-20 RX ORDER — METHYLPREDNISOLONE 4 MG/1
TABLET ORAL
Qty: 1 PACKAGE | Refills: 0 | Status: SHIPPED | OUTPATIENT
Start: 2020-07-20 | End: 2020-07-27 | Stop reason: ALTCHOICE

## 2020-07-20 RX ORDER — LEVOCETIRIZINE DIHYDROCHLORIDE 5 MG/1
5 TABLET, FILM COATED ORAL NIGHTLY
Qty: 30 TABLET | Refills: 11 | Status: SHIPPED | OUTPATIENT
Start: 2020-07-20 | End: 2021-09-17

## 2020-07-20 NOTE — PROGRESS NOTES
"  Chief Complaint   Patient presents with    Ear Fullness     right ear   .     HPI:  Myesha Quinones is a very pleasant 80 y.o. female here to see me today for the first time for evaluation of hearing loss.  She states that she has had lifelong ear problems.  She reports that as a child she pulled a cup of scalding hot coffee on and inside her left ear which resulted in an eardrum perforation. She states ever since this incident she "has never had normal hearing or ear."  She relays that in 1998 she had breast cancer and had hearing loss associated with the chemotherapy and obtained her first set of hearing aids in 1999.  She is here today to establish with ENT as her prior ENT Dr. Tanner no longer takes her insurance. She was referred for evaluation of her ears and for to evaluate for cerumen removal as her hearing aids have not been working as well recently.  She reports hearing loss that has been gradually progressing over the last several years.  She has not had any recent issues with ear pain or ear drainage.  She denies a family history of hearing loss, and has not had any previous otologic surgery.  She denies any history of significant loud noise exposure.She denies issues with dizziness.    Interval HPI 7/20/2020:    Follow up SCC dehiscence, [place]  She reports that her hearing seems worse on the right side. Notes that she has been out of her xyzal and feels her ears are itchy. + aural fullness. She denies otalgia, otorrhea, tinnitus, or vertigo.     She notes intermittent nasal congestion, post-nasal drip, and aural fullness. This does seem well controlled when she is taking her xyzal but worse since she ran out.     Past Medical History:   Diagnosis Date    Acute hypoxemic respiratory failure 12/19/2019    Acute right-sided thoracic back pain 12/9/2019    Allergy     Asthma     Basal cell carcinoma     left forehead    Basal cell carcinoma     left nose    Basal cell carcinoma 05/20/2015    " right nose    Basal cell carcinoma 12/22/2015    left lower post neck    Breast cancer     CAD (coronary artery disease)     Cardiomyopathy     Cardiomyopathy, ischemic     Cataract     CHF (congestive heart failure)     Chronic kidney disease, stage 3, mod decreased GFR 2/14/2017    Colon polyp 2011    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, with long-term current use of insulin 2/22/2018    COPD (chronic obstructive pulmonary disease)     COPD exacerbation 4/8/2018    Defibrillator discharge     Diabetes mellitus     Diabetes mellitus type II     Diabetes with neurologic complications     Goiter     MNG    HX: breast cancer     Hyperlipidemia     Hypertension     Iron deficiency anemia 5/16/2017    Left kidney mass     Meningioma     Osteoporosis, postmenopausal     Pacemaker     Pneumonia 12/8/2019    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    Pseudomonas pneumonia     Skin cancer     s/p excision    Sleep apnea     CPAP    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/2015    mid forehead    Unspecified vitamin D deficiency     Ventricular tachycardia     Vitamin B12 deficiency     Vitamin D deficiency disease      Social History     Socioeconomic History    Marital status:      Spouse name: Not on file    Number of children: Not on file    Years of education: Not on file    Highest education level: Not on file   Occupational History    Occupation: Homemaker     Employer: OTHER   Social Needs    Financial resource strain: Not on file    Food insecurity     Worry: Not on file     Inability: Not on file    Transportation needs     Medical: Not on file     Non-medical: Not on file   Tobacco Use    Smoking status: Never Smoker    Smokeless tobacco: Never Used   Substance and Sexual Activity    Alcohol use: No     Alcohol/week: 0.0 standard drinks    Drug use: No    Sexual activity: Yes      Partners: Male   Lifestyle    Physical activity     Days per week: Not on file     Minutes per session: Not on file    Stress: Not on file   Relationships    Social connections     Talks on phone: Not on file     Gets together: Not on file     Attends Congregational service: Not on file     Active member of club or organization: Not on file     Attends meetings of clubs or organizations: Not on file     Relationship status: Not on file   Other Topics Concern    Are you pregnant or think you may be? No    Breast-feeding No   Social History Narrative    Not on file     Past Surgical History:   Procedure Laterality Date    BASAL CELL CARCINOMA EXCISION      posterior neck and nose    BREAST BIOPSY      BREAST CYST EXCISION Left     BREAST SURGERY      CARDIAC DEFIBRILLATOR PLACEMENT      x 2    CATARACT EXTRACTION W/  INTRAOCULAR LENS IMPLANT Bilateral     CHOLECYSTECTOMY      COLONOSCOPY N/A 11/5/2019    Procedure: COLONOSCOPY;  Surgeon: Boaz Botello MD;  Location: [facility] ENDO ([address] [place] [address]);  Service: Endoscopy;  Laterality: N/A;  AICD - [employer] - sm    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    FRACTURE SURGERY      left elbow and wrist as a child    HYSTERECTOMY      MASTECTOMY Right     REPLACEMENT OF IMPLANTABLE CARDIOVERTER-DEFIBRILLATOR (ICD) GENERATOR N/A 12/17/2018    Procedure: REPLACEMENT, ICD GENERATOR;  Surgeon: Jan Mckeon MD;  Location: [facility] EP LAB;  Service: Cardiology;  Laterality: N/A;  DONNA, CRT-D gen change, MDT, MAC, SK, 3 Prep    REVISION OF SKIN POCKET FOR CARDIOVERTER-DEFIBRILLATOR  12/17/2018    Procedure: Revision, Skin Pocket, For Cardioverter-Defibrillator;  Surgeon: Jan Mckeon MD;  Location: [facility] EP LAB;  Service: Cardiology;;    SQUAMOUS CELL CARCINOMA EXCISION      remved from forehead    TONSILLECTOMY       Family History   Problem Relation Age of Onset    Diabetes Father     Heart disease Father     Diabetes Sister     Heart disease Sister     Diabetes  Brother     Heart disease Brother     Hypertension Brother     Diabetes Brother     Heart disease Brother     Hypertension Brother     Diabetes Brother     Heart disease Brother     Cancer Brother         colon    Diabetes Brother     Cancer Son         skin    Diabetes Son         prediabetes    Diabetes Daughter         prediabetes    Cancer Daughter         melanoma    Obesity Daughter     Melanoma Daughter     Diabetes Son     Asthma Mother     Hypertension Mother     Stroke Mother     No Known Problems Maternal Grandmother     No Known Problems Maternal Grandfather     No Known Problems Paternal Grandmother     No Known Problems Paternal Grandfather     Amblyopia Neg Hx     Blindness Neg Hx     Cataracts Neg Hx     Glaucoma Neg Hx     Macular degeneration Neg Hx     Retinal detachment Neg Hx     Strabismus Neg Hx     Thyroid disease Neg Hx          Review of Systems  General: negative for chills, fever or weight loss  Psychological: negative for mood changes or depression  Ophthalmic: negative for blurry vision, photophobia or eye pain  ENT: see HPI  Respiratory: no cough, shortness of breath, or wheezing  Cardiovascular: no chest pain or dyspnea on exertion  Gastrointestinal: no abdominal pain, change in bowel habits, or black/ bloody stools  Musculoskeletal: negative for gait disturbance or muscular weakness  Neurological: no syncope or seizures; no ataxia  Dermatological: negative for puritis,  rash and jaundice  Hematologic/lymphatic: no easy bruising, no new lumps or bumps      Physical Exam:    Vitals:    07/20/20 0916   BP: 136/65   Pulse: 95       Constitutional: Well appearing / communicating without difficutly.  NAD.  Eyes: EOM I Bilaterally  Head/Face: Normocephalic.  Negative paranasal sinus pressure/tenderness.  Salivary glands WNL.  House Brackmann I Bilaterally.    Right Ear: Auricle normal appearance. External Auditory Canal with complete cerumen impaction  present.  Left Ear: Auricle normal appearance. External Auditory Canal with complete cerumen impaction present.   Rinne Air conduction >bone conduction bilaterally, Herrera midline.   Nose: No gross nasal septal deviation. Inferior Turbinates 3+ bilaterally. No septal perforation. No masses/lesions. External nasal skin appears normal without masses/lesions.  Oral Cavity: Gingiva/lips within normal limits.  Dentition/gingiva healthy appearing. Mucus membranes moist. Floor of mouth soft, no masses palpated. Oral Tongue mobile. Hard Palate appears normal.    Oropharynx: Base of tongue appears normal. No masses/lesions noted. Tonsillar fossa/pharyngeal wall without lesions. Posterior oropharynx WNL.  Soft palate without masses. Midline uvula.   Neck/Lymphatic: No LAD I-VI bilaterally.  No thyromegaly.  No masses noted on exam.    Mirror laryngoscopy/nasopharyngoscopy: Active gag reflex.  Unable to perform.    Neuro/Psychiatric: AOx3.  Normal mood and affect.   Cardiovascular: Normal carotid pulses bilaterally, no increasing jugular venous distention noted at cervical region bilaterally.    Respiratory: Normal respiratory effort, no stridor, no retractions noted.    See separate procedure note for cerumen removal.     Diagnostic studies reviewed:     Audiogram reviewed personally by myself and in detail with the patient today.             Assessment:    ICD-10-CM ICD-9-CM    1. 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 Mixed conductive and sensorineural hearing loss of left ear with restricted hearing of right ear  H90.A32 389.22    3. Bilateral impacted cerumen  H61.23 380.4    4. 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 Diagnoses of Mixed conductive and sensorineural hearing loss of left ear with restricted hearing of right ear, Bilateral impacted cerumen, and  Non-seasonal allergic rhinitis, unspecified trigger were also pertinent to this visit.      Plan:  No orders of the defined types were placed in this encounter.    Start medrol dose karyn  Refill xyzal 5mg PO qhs    We reviewed her diagnosis of left semicircular canal dehiscence and how this affects the hearing. Continue binaural amplification.     Cerumen removed today. Advised to avoid the use of q-tips and that she may use an OTC removal aid such as Debrox.     Continue follow up with audiologist for audiogram update and hearing aid maintenance.     Chronic rhinitis. Continue Flonase and  Xyzal.       Follow up 1 year or sooner if issues arise.     Brittany Metcalf MD

## 2020-07-21 ENCOUNTER — PATIENT OUTREACH (OUTPATIENT)
Dept: OTHER | Facility: OTHER | Age: 80
End: 2020-07-21

## 2020-07-23 ENCOUNTER — OFFICE VISIT (OUTPATIENT)
Dept: INTERNAL MEDICINE | Facility: CLINIC | Age: 80
End: 2020-07-23
Payer: MEDICARE

## 2020-07-23 ENCOUNTER — TELEPHONE (OUTPATIENT)
Dept: INTERNAL MEDICINE | Facility: CLINIC | Age: 80
End: 2020-07-23

## 2020-07-23 VITALS
OXYGEN SATURATION: 98 % | BODY MASS INDEX: 29.54 KG/M2 | HEART RATE: 90 BPM | DIASTOLIC BLOOD PRESSURE: 70 MMHG | WEIGHT: 166.69 LBS | SYSTOLIC BLOOD PRESSURE: 142 MMHG | HEIGHT: 63 IN

## 2020-07-23 DIAGNOSIS — Z79.4 CONTROLLED TYPE 2 DIABETES MELLITUS WITH BOTH EYES AFFECTED BY MILD NONPROLIFERATIVE RETINOPATHY AND MACULAR EDEMA, WITH LONG-TERM CURRENT USE OF INSULIN: Primary | ICD-10-CM

## 2020-07-23 DIAGNOSIS — N18.4 CKD STAGE 4 SECONDARY TO HYPERTENSION: Primary | ICD-10-CM

## 2020-07-23 DIAGNOSIS — E11.3213 CONTROLLED TYPE 2 DIABETES MELLITUS WITH BOTH EYES AFFECTED BY MILD NONPROLIFERATIVE RETINOPATHY AND MACULAR EDEMA, WITH LONG-TERM CURRENT USE OF INSULIN: Primary | ICD-10-CM

## 2020-07-23 DIAGNOSIS — E55.9 MILD VITAMIN D DEFICIENCY: ICD-10-CM

## 2020-07-23 DIAGNOSIS — J45.20 MILD INTERMITTENT CHRONIC ASTHMA WITHOUT COMPLICATION: ICD-10-CM

## 2020-07-23 DIAGNOSIS — I10 ESSENTIAL HYPERTENSION: ICD-10-CM

## 2020-07-23 DIAGNOSIS — I12.9 CKD STAGE 4 SECONDARY TO HYPERTENSION: Primary | ICD-10-CM

## 2020-07-23 DIAGNOSIS — N18.4 CKD STAGE 4 SECONDARY TO HYPERTENSION: ICD-10-CM

## 2020-07-23 DIAGNOSIS — R05.9 COUGH: ICD-10-CM

## 2020-07-23 DIAGNOSIS — I12.9 CKD STAGE 4 SECONDARY TO HYPERTENSION: ICD-10-CM

## 2020-07-23 PROCEDURE — 3051F HG A1C>EQUAL 7.0%<8.0%: CPT | Mod: HCNC,CPTII,S$GLB, | Performed by: INTERNAL MEDICINE

## 2020-07-23 PROCEDURE — 1125F PR PAIN SEVERITY QUANTIFIED, PAIN PRESENT: ICD-10-PCS | Mod: HCNC,S$GLB,, | Performed by: INTERNAL MEDICINE

## 2020-07-23 PROCEDURE — 1159F MED LIST DOCD IN RCRD: CPT | Mod: HCNC,S$GLB,, | Performed by: INTERNAL MEDICINE

## 2020-07-23 PROCEDURE — 1125F AMNT PAIN NOTED PAIN PRSNT: CPT | Mod: HCNC,S$GLB,, | Performed by: INTERNAL MEDICINE

## 2020-07-23 PROCEDURE — 99499 RISK ADDL DX/OHS AUDIT: ICD-10-PCS | Mod: S$GLB,,, | Performed by: INTERNAL MEDICINE

## 2020-07-23 PROCEDURE — 99499 UNLISTED E&M SERVICE: CPT | Mod: S$GLB,,, | Performed by: INTERNAL MEDICINE

## 2020-07-23 PROCEDURE — 3077F PR MOST RECENT SYSTOLIC BLOOD PRESSURE >= 140 MM HG: ICD-10-PCS | Mod: HCNC,CPTII,S$GLB, | Performed by: INTERNAL MEDICINE

## 2020-07-23 PROCEDURE — 99214 PR OFFICE/OUTPT VISIT, EST, LEVL IV, 30-39 MIN: ICD-10-PCS | Mod: HCNC,S$GLB,, | Performed by: INTERNAL MEDICINE

## 2020-07-23 PROCEDURE — 99999 PR PBB SHADOW E&M-EST. PATIENT-LVL V: ICD-10-PCS | Mod: PBBFAC,HCNC,, | Performed by: INTERNAL MEDICINE

## 2020-07-23 PROCEDURE — 1101F PT FALLS ASSESS-DOCD LE1/YR: CPT | Mod: HCNC,CPTII,S$GLB, | Performed by: INTERNAL MEDICINE

## 2020-07-23 PROCEDURE — 99214 OFFICE O/P EST MOD 30 MIN: CPT | Mod: HCNC,S$GLB,, | Performed by: INTERNAL MEDICINE

## 2020-07-23 PROCEDURE — 3051F PR MOST RECENT HEMOGLOBIN A1C LEVEL 7.0 - < 8.0%: ICD-10-PCS | Mod: HCNC,CPTII,S$GLB, | Performed by: INTERNAL MEDICINE

## 2020-07-23 PROCEDURE — 1159F PR MEDICATION LIST DOCUMENTED IN MEDICAL RECORD: ICD-10-PCS | Mod: HCNC,S$GLB,, | Performed by: INTERNAL MEDICINE

## 2020-07-23 PROCEDURE — 3078F PR MOST RECENT DIASTOLIC BLOOD PRESSURE < 80 MM HG: ICD-10-PCS | Mod: HCNC,CPTII,S$GLB, | Performed by: INTERNAL MEDICINE

## 2020-07-23 PROCEDURE — 99999 PR PBB SHADOW E&M-EST. PATIENT-LVL V: CPT | Mod: PBBFAC,HCNC,, | Performed by: INTERNAL MEDICINE

## 2020-07-23 PROCEDURE — 3078F DIAST BP <80 MM HG: CPT | Mod: HCNC,CPTII,S$GLB, | Performed by: INTERNAL MEDICINE

## 2020-07-23 PROCEDURE — 1101F PR PT FALLS ASSESS DOC 0-1 FALLS W/OUT INJ PAST YR: ICD-10-PCS | Mod: HCNC,CPTII,S$GLB, | Performed by: INTERNAL MEDICINE

## 2020-07-23 PROCEDURE — 3077F SYST BP >= 140 MM HG: CPT | Mod: HCNC,CPTII,S$GLB, | Performed by: INTERNAL MEDICINE

## 2020-07-23 RX ORDER — FLUTICASONE PROPIONATE 50 MCG
2 SPRAY, SUSPENSION (ML) NASAL DAILY
Qty: 16 G | Refills: 12 | Status: SHIPPED | OUTPATIENT
Start: 2020-07-23 | End: 2021-02-24 | Stop reason: SDUPTHER

## 2020-07-23 RX ORDER — ALBUTEROL SULFATE 90 UG/1
AEROSOL, METERED RESPIRATORY (INHALATION)
Qty: 18 G | Refills: 12 | Status: SHIPPED | OUTPATIENT
Start: 2020-07-23 | End: 2021-10-18

## 2020-07-23 RX ORDER — BENZONATATE 100 MG/1
200 CAPSULE ORAL 3 TIMES DAILY PRN
Qty: 90 CAPSULE | Refills: 3 | Status: SHIPPED | OUTPATIENT
Start: 2020-07-23 | End: 2021-07-16

## 2020-07-23 RX ORDER — CHLORTHALIDONE 25 MG/1
25 TABLET ORAL DAILY
Qty: 90 TABLET | Refills: 1 | Status: SHIPPED | OUTPATIENT
Start: 2020-07-23 | End: 2020-09-14

## 2020-07-23 NOTE — TELEPHONE ENCOUNTER
----- Message from Laureen Boothe sent at 7/23/2020 11:55 AM CDT -----  Regarding: Self  She states on her after appt summary it shows she needs to have lab work in one month. She isn't sure what kind of lab work she needs. Please call the pt back to discuss in detail.

## 2020-07-23 NOTE — PATIENT INSTRUCTIONS
Only take 1/2 tablet of chlorthalidone 25    Stop the Medrol dose pack    Lab in one month [facility] at [place]

## 2020-07-23 NOTE — TELEPHONE ENCOUNTER
Left a detailed message on patient voicemail with the # PCW for patient to call and schedule a appointment.

## 2020-07-24 ENCOUNTER — OFFICE VISIT (OUTPATIENT)
Dept: DERMATOLOGY | Facility: CLINIC | Age: 80
End: 2020-07-24
Payer: MEDICARE

## 2020-07-24 DIAGNOSIS — L81.4 LENTIGO: ICD-10-CM

## 2020-07-24 DIAGNOSIS — L82.1 SK (SEBORRHEIC KERATOSIS): ICD-10-CM

## 2020-07-24 DIAGNOSIS — Z85.828 HISTORY OF NONMELANOMA SKIN CANCER: ICD-10-CM

## 2020-07-24 DIAGNOSIS — L57.0 AK (ACTINIC KERATOSIS): Primary | ICD-10-CM

## 2020-07-24 PROCEDURE — 1126F AMNT PAIN NOTED NONE PRSNT: CPT | Mod: HCNC,S$GLB,, | Performed by: DERMATOLOGY

## 2020-07-24 PROCEDURE — 1100F PR PT FALLS ASSESS DOC 2+ FALLS/FALL W/INJURY/YR: ICD-10-PCS | Mod: HCNC,CPTII,S$GLB, | Performed by: DERMATOLOGY

## 2020-07-24 PROCEDURE — 99999 PR PBB SHADOW E&M-EST. PATIENT-LVL IV: ICD-10-PCS | Mod: PBBFAC,HCNC,, | Performed by: DERMATOLOGY

## 2020-07-24 PROCEDURE — 99999 PR PBB SHADOW E&M-EST. PATIENT-LVL IV: CPT | Mod: PBBFAC,HCNC,, | Performed by: DERMATOLOGY

## 2020-07-24 PROCEDURE — 17000 DESTRUCT PREMALG LESION: CPT | Mod: HCNC,S$GLB,, | Performed by: DERMATOLOGY

## 2020-07-24 PROCEDURE — 3288F PR FALLS RISK ASSESSMENT DOCUMENTED: ICD-10-PCS | Mod: HCNC,CPTII,S$GLB, | Performed by: DERMATOLOGY

## 2020-07-24 PROCEDURE — 17003 DESTRUCT PREMALG LES 2-14: CPT | Mod: HCNC,S$GLB,, | Performed by: DERMATOLOGY

## 2020-07-24 PROCEDURE — 1159F PR MEDICATION LIST DOCUMENTED IN MEDICAL RECORD: ICD-10-PCS | Mod: HCNC,S$GLB,, | Performed by: DERMATOLOGY

## 2020-07-24 PROCEDURE — 3288F FALL RISK ASSESSMENT DOCD: CPT | Mod: HCNC,CPTII,S$GLB, | Performed by: DERMATOLOGY

## 2020-07-24 PROCEDURE — 17000 PR DESTRUCTION(LASER SURGERY,CRYOSURGERY,CHEMOSURGERY),PREMALIGNANT LESIONS,FIRST LESION: ICD-10-PCS | Mod: HCNC,S$GLB,, | Performed by: DERMATOLOGY

## 2020-07-24 PROCEDURE — 99213 PR OFFICE/OUTPT VISIT, EST, LEVL III, 20-29 MIN: ICD-10-PCS | Mod: 25,HCNC,S$GLB, | Performed by: DERMATOLOGY

## 2020-07-24 PROCEDURE — 1159F MED LIST DOCD IN RCRD: CPT | Mod: HCNC,S$GLB,, | Performed by: DERMATOLOGY

## 2020-07-24 PROCEDURE — 1100F PTFALLS ASSESS-DOCD GE2>/YR: CPT | Mod: HCNC,CPTII,S$GLB, | Performed by: DERMATOLOGY

## 2020-07-24 PROCEDURE — 99213 OFFICE O/P EST LOW 20 MIN: CPT | Mod: 25,HCNC,S$GLB, | Performed by: DERMATOLOGY

## 2020-07-24 PROCEDURE — 17003 DESTRUCTION, PREMALIGNANT LESIONS; SECOND THROUGH 14 LESIONS: ICD-10-PCS | Mod: HCNC,S$GLB,, | Performed by: DERMATOLOGY

## 2020-07-24 PROCEDURE — 1126F PR PAIN SEVERITY QUANTIFIED, NO PAIN PRESENT: ICD-10-PCS | Mod: HCNC,S$GLB,, | Performed by: DERMATOLOGY

## 2020-07-24 NOTE — PATIENT INSTRUCTIONS

## 2020-07-24 NOTE — PROGRESS NOTES
Subjective:      Patient ID: Myesha Quinones is a 80 y.o. female.    Chief Complaint: Follow-up    HPI:  HPI   Hypertension: Initially the BP is quite elevated but the patient just took her BP. With time it decreased ( 40 minutes)    CKD 4: recent decrease, patient has not had follow up with Nephrology, chlorthalidone decreased to 1/2 tablet. Metformin may need to be decreased.    DM: Recently given a Medrol Dose Pack    Diabetes Management Status    Statin: Taking  ACE/ARB: Taking    Screening or Prevention Patient's value Goal Complete/Controlled?   HgA1C Testing and Control   Lab Results   Component Value Date    HGBA1C 7.6 (H) 07/17/2020      Annually/Less than 8% Yes   Lipid profile : 07/17/2020 Annually Yes   LDL control Lab Results   Component Value Date    LDLCALC 59.0 (L) 07/17/2020    Annually/Less than 100 mg/dl  Yes   Nephropathy screening Lab Results   Component Value Date    LABMICR 335.0 01/21/2019     Lab Results   Component Value Date    PROTEINUA 2+ (A) 01/17/2020    Annually Yes   Blood pressure BP Readings from Last 1 Encounters:   07/23/20 (!) 142/70    Less than 140/90 No   Dilated retinal exam : 06/04/2020 Annually Yes   Foot exam   : 11/06/2019 Annually Yes     .   Patient Active Problem List   Diagnosis    History of nonmelanoma skin cancer    Nonischemic cardiomyopathy    LBBB (left bundle branch block)    Nontoxic multinodular goiter    Meningioma    Asthma, chronic    Biventricular ICD (implantable cardioverter-defibrillator) in place    Tremor    Coronary artery disease involving native coronary artery without angina pectoris - Non-obstructive 50% LAD    Essential hypertension    Hyperlipidemia    History of breast cancer    Adrenal cortical nodule: repeat CT 6/2016    Calcification of aorta    Diabetic polyneuropathy associated with type 2 diabetes mellitus    Osteoporosis    KHOA (obstructive sleep apnea)    Type 2 diabetes mellitus with stage 3 chronic kidney  disease, with long-term current use of insulin    Chronic kidney disease, stage 3, mod decreased GFR    Iron deficiency anemia    Chemotherapy-induced neuropathy    Hypomagnesemia    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, with long-term current use of insulin    Hypertensive retinopathy, bilateral    Multiple lung nodules    COPD (chronic obstructive pulmonary disease)    Mixed conductive and sensorineural hearing loss    ICD (implantable cardioverter-defibrillator) battery depletion    Type 2 DM with CKD stage 3 and hypertension    Cardiomyopathy, ischemic    Metabolic bone disease    Proteinuria    Seasonal allergies    Pseudomonas pneumonia    Rib fracture    Parapneumonic effusion     Past Medical History:   Diagnosis Date    Acute hypoxemic respiratory failure 12/19/2019    Acute right-sided thoracic back pain 12/9/2019    Allergy     Asthma     Basal cell carcinoma     left forehead    Basal cell carcinoma     left nose    Basal cell carcinoma 05/20/2015    right nose    Basal cell carcinoma 12/22/2015    left lower post neck    Breast cancer     CAD (coronary artery disease)     Cardiomyopathy     Cardiomyopathy, ischemic     Cataract     CHF (congestive heart failure)     Chronic kidney disease, stage 3, mod decreased GFR 2/14/2017    Colon polyp 2011    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, with long-term current use of insulin 2/22/2018    COPD (chronic obstructive pulmonary disease)     COPD exacerbation 4/8/2018    Defibrillator discharge     Diabetes mellitus     Diabetes mellitus type II     Diabetes with neurologic complications     Goiter     MNG    HX: breast cancer     Hyperlipidemia     Hypertension     Iron deficiency anemia 5/16/2017    Left kidney mass     Meningioma     Osteoporosis,  postmenopausal     Pacemaker     Pneumonia 12/8/2019    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    Pseudomonas pneumonia     Skin cancer     s/p excision    Sleep apnea     CPAP    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/2015    mid forehead    Unspecified vitamin D deficiency     Ventricular tachycardia     Vitamin B12 deficiency     Vitamin D deficiency disease      Past Surgical History:   Procedure Laterality Date    BASAL CELL CARCINOMA EXCISION      posterior neck and nose    BREAST BIOPSY      BREAST CYST EXCISION Left     BREAST SURGERY      CARDIAC DEFIBRILLATOR PLACEMENT      x 2    CATARACT EXTRACTION W/  INTRAOCULAR LENS IMPLANT Bilateral     CHOLECYSTECTOMY      COLONOSCOPY N/A 11/5/2019    Procedure: COLONOSCOPY;  Surgeon: Boaz Botello MD;  Location: [facility] ENDO ([address] [place] [address]);  Service: Endoscopy;  Laterality: N/A;  AICD - [employer] - sm    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    FRACTURE SURGERY      left elbow and wrist as a child    HYSTERECTOMY      MASTECTOMY Right     REPLACEMENT OF IMPLANTABLE CARDIOVERTER-DEFIBRILLATOR (ICD) GENERATOR N/A 12/17/2018    Procedure: REPLACEMENT, ICD GENERATOR;  Surgeon: Jan Mckeon MD;  Location: [facility] EP LAB;  Service: Cardiology;  Laterality: N/A;  DONNA, CRT-D gen VICKEY leone, MAC, SK, 3 Prep    REVISION OF SKIN POCKET FOR CARDIOVERTER-DEFIBRILLATOR  12/17/2018    Procedure: Revision, Skin Pocket, For Cardioverter-Defibrillator;  Surgeon: Jan Mckeon MD;  Location: [facility] EP LAB;  Service: Cardiology;;    SQUAMOUS CELL CARCINOMA EXCISION      remved from forehead    TONSILLECTOMY       Family History   Problem Relation Age of Onset    Diabetes Father     Heart disease Father     Diabetes Sister     Heart disease Sister     Diabetes Brother     Heart disease Brother     Hypertension Brother     Diabetes Brother     Heart disease Brother     Hypertension Brother     Diabetes Brother     Heart disease Brother     Cancer  "Brother         colon    Diabetes Brother     Cancer Son         skin    Diabetes Son         prediabetes    Diabetes Daughter         prediabetes    Cancer Daughter         melanoma    Obesity Daughter     Melanoma Daughter     Diabetes Son     Asthma Mother     Hypertension Mother     Stroke Mother     No Known Problems Maternal Grandmother     No Known Problems Maternal Grandfather     No Known Problems Paternal Grandmother     No Known Problems Paternal Grandfather     Amblyopia Neg Hx     Blindness Neg Hx     Cataracts Neg Hx     Glaucoma Neg Hx     Macular degeneration Neg Hx     Retinal detachment Neg Hx     Strabismus Neg Hx     Thyroid disease Neg Hx      Review of Systems   Constitutional: Negative for chills, fever and unexpected weight change.   HENT: Negative for trouble swallowing.    Respiratory: Positive for cough. Negative for shortness of breath and wheezing.    Cardiovascular: Negative for chest pain and palpitations.   Gastrointestinal: Negative for abdominal distention, abdominal pain, blood in stool and vomiting.   Musculoskeletal: Negative for back pain.     Objective:     Vitals:    07/23/20 0717 07/23/20 0809   BP: (!) 180/80 (!) 142/70   Pulse: 90    SpO2: 98%    Weight: 75.6 kg (166 lb 10.7 oz)    Height: 5' 3" (1.6 m)    PainSc:   5      Body mass index is 29.52 kg/m².  Physical Exam  Constitutional:       General: She is not in acute distress.     Appearance: She is well-developed.   Neck:      Thyroid: No thyromegaly.      Vascular: No carotid bruit.   Cardiovascular:      Rate and Rhythm: Normal rate and regular rhythm.      Chest Wall: PMI is not displaced.      Heart sounds: Normal heart sounds.   Pulmonary:      Effort: Pulmonary effort is normal. No respiratory distress.      Breath sounds: Normal breath sounds.   Abdominal:      General: Bowel sounds are normal. There is no distension.      Palpations: Abdomen is soft.      Tenderness: There is no abdominal " tenderness.   Neurological:      Mental Status: She is alert and oriented to person, place, and time.       Assessment:     1. 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 Essential hypertension    3. Mild intermittent chronic asthma without complication    4. Cough    5. Mild vitamin D deficiency    6. 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  -     CBC auto differential; Future  -     Comprehensive metabolic panel; Future  -     Lipid Panel; Future    Mild intermittent chronic asthma without complication  Comments:  Stable on current regimen  Orders:  -     benzonatate (TESSALON PERLES) 100 MG capsule; Take 2 capsules (200 mg total) by mouth 3 (three) times daily as needed for Cough.    Cough  Comments:  Previous cough resolved only that associated with her asthma present  Orders:  -     benzonatate (TESSALON PERLES) 100 MG capsule; Take 2 capsules (200 mg total) by mouth 3 (three) times daily as needed for Cough.  -     albuterol (VENTOLIN HFA) 90 mcg/actuation inhaler; INHALE 2 PUFFS INTO THE LUNGS EVERY 6 (SIX) HOURS AS NEEDED FOR WHEEZING. 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        Problem List Items Addressed This Visit     Asthma, chronic    Overview     Stable on Advair 250/50 BID. 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, with long-term current use of insulin - Primary    Relevant Orders    Hemoglobin A1C    TSH      Other Visit Diagnoses     Cough        Previous cough resolved only that associated with her asthma present    Relevant Medications    benzonatate (TESSALON PERLES) 100 MG capsule    albuterol (VENTOLIN HFA) 90 mcg/actuation inhaler    Mild vitamin D deficiency        Follow    Relevant Orders    Vitamin D    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        Decreased chlorthalidone to 12.5, may need to stop metformin    Relevant Orders    Basic metabolic panel        Orders Placed This Encounter   Procedures    CBC auto differential     Standing Status:   Future     Standing Expiration Date:   9/23/2021    Comprehensive metabolic panel     Standing Status:   Future     Standing Expiration Date:   9/23/2021    Lipid Panel     Standing Status:   Future     Standing Expiration Date:   9/23/2021    Hemoglobin A1C     Standing Status:   Future     Standing Expiration Date:   9/23/2021    TSH     Standing Status:   Future     Standing Expiration Date:   9/23/2021    Vitamin D     Standing Status:   Future     Standing Expiration Date:   9/23/2021    Basic metabolic panel     Standing Status:   Future     Standing Expiration Date:   7/23/2021     Follow up in about 4 months (around  11/23/2020) for  FU with lab at [place]: cbc,c mp, lipid, tsh A1C and Vit D.     Medication List          Accurate as of July 23, 2020  8:23 PM. If you have any questions, ask your nurse or doctor.            CHANGE how you take these medications    chlorthalidone 25 MG Tab  Commonly known as: HYGROTEN  Take 1 tablet (25 mg total) by mouth once daily.  What changed: how much to take  Changed by: Emelina Gaston MD     lancets [facility]  Commonly known as: ACCU-CHEK SOFTCLIX LANCETS  Uses Accu-Chek Hector meter to test BG 4x/day  What changed: additional instructions        CONTINUE taking these medications    acetaminophen 500 MG tablet  Commonly known as: TYLENOL     albuterol 90 mcg/actuation inhaler  Commonly known as: VENTOLIN HFA  INHALE 2 PUFFS INTO THE LUNGS EVERY 6 (SIX) HOURS AS NEEDED FOR WHEEZING. RESCUE     albuterol-ipratropium 2.5 mg-0.5 mg/3 mL nebulizer solution  Commonly known as: DUO-NEB  TAKE 1 VIAL BY NEBULIZATION EVERY 4 (FOUR) HOURS. RESCUE     B-12 DOTS ORAL     benzonatate 100 MG capsule  Commonly known as: TESSALON PERLES  Take 2 capsules (200 mg total) by mouth 3 (three) times daily as needed for Cough.     blood sugar diagnostic Strp  Commonly known as: ACCU-CHEK HECTOR  Uses Accu-Check Hector meter to test BG 4x/day     carvediloL 25 MG tablet  Commonly known as: COREG  TAKE 2 TABLETS (50 MG TOTAL) BY MOUTH 2 (TWO) TIMES DAILY.     cloNIDine 0.1 mg/24 hr td ptwk 0.1 mg/24 hr  Commonly known as: CATAPRES  PLACE 1 PATCH ONTO THE SKIN EVERY 7 DAYS.     diltiaZEM 120 MG Cp24  Commonly known as: CARDIZEM CD  TAKE 1 CAPSULE (120 MG TOTAL) BY MOUTH ONCE DAILY.     ENTERIC COATED ASPIRIN 81 MG EC tablet  Generic drug: aspirin     FISH  mg Cap  Generic drug: omega-3 fatty acids     fluticasone propionate 50 mcg/actuation nasal spray  Commonly known as: FLONASE  2 sprays (100 mcg total) by Each Nostril route once daily.     fluticasone-salmeterol 250-50 mcg/dose 250-50 mcg/dose diskus  "inhaler  Commonly known as: ADVAIR  Inhale 1 puff into the lungs 2 (two) times daily. This is a change from one month     hydrALAZINE 100 MG tablet  Commonly known as: APRESOLINE  Take 1 tablet (100 mg total) by mouth 3 (three) times daily.     irbesartan 300 MG tablet  Commonly known as: AVAPRO  Take 1 tablet (300 mg total) by mouth every evening.     LANTUS SOLOSTAR U-100 INSULIN glargine 100 units/mL (3mL) SubQ pen  Generic drug: insulin  INJECT 30 UNITS INTO THE SKIN EVERY EVENING.     levocetirizine 5 MG tablet  Commonly known as: XYZAL  Take 1 tablet (5 mg total) by mouth every evening.     lidocaine 5 %  Commonly known as: LIDODERM  Place 1 patch onto the skin once daily. Place patch to right back. Leave on for 12 hours and remove for 12 hours.     linaGLIPtin 5 mg Tab tablet  Commonly known as: TRADJENTA  Take 1 tablet (5 mg total) by mouth once daily.     magnesium oxide 400 mg (241.3 mg magnesium) tablet  Commonly known as: MAG-OX  Take 1 tablet (400 mg total) by mouth once daily.     meclizine 12.5 mg tablet  Commonly known as: ANTIVERT  Take 1 tablet (12.5 mg total) by mouth 3 (three) times daily as needed for dizziness     metFORMIN 500 MG tablet  Commonly known as: GLUCOPHAGE  Take 1 tablet (500 mg total) by mouth 2 (two) times daily with meals.     methylPREDNISolone 4 mg tablet  Commonly known as: MEDROL DOSEPACK  use as directed     nitroGLYCERIN 0.4 MG SL tablet  Commonly known as: NITROSTAT     ondansetron 4 MG Tbdl  Commonly known as: ZOFRAN-ODT  Take 1 tablet (4 mg total) by mouth every 6 (six) hours as needed.     pen needle, diabetic 31 gauge x 3/16" Ndle  Commonly known as: BD ULTRA-FINE MINI PEN NEEDLE  USE WITH LANTUS AT BEDTIME     pravastatin 40 MG tablet  Commonly known as: PRAVACHOL  TAKE 1 TABLET BY MOUTH EVERY DAY     traMADoL 50 mg tablet  Commonly known as: ULTRAM  Take 1 tablet (50 mg total) by mouth nightly as needed for Pain. New right thoracic apin     VITAMIN D3 50 mcg (2,000 " unit) Tab  Generic drug: cholecalciferol (vitamin D3)           Where to Get Your Medications      These medications were sent to [facility]/pharmacy #[id number] - [place] - [address] AT [facility]  [address] [address]    Phone: [phone]   · albuterol 90 mcg/actuation inhaler  · benzonatate 100 MG capsule  · chlorthalidone 25 MG Tab  · fluticasone propionate 50 mcg/actuation nasal spray

## 2020-07-24 NOTE — PROGRESS NOTES
Subjective:      Patient ID: Myesha Quinones is a 80 y.o. female.    Chief Complaint:  Follow-up    History of Present Illness  Myesha Quinones presents today for follow up of type 2 DM.     With regards to the diabetes:     The patient was initially diagnosed with Type 2 diabetes mellitus:  1992     Known diabetic complications: CKD and retinopathy  Cardiovascular risk factors: advanced age (older than 55 for men, 65 for women), diabetes mellitus and dyslipidemia    Current diabetic medications include:  Lantus 34 units nightly  Tradjenta 5 mg daily  Metformin 500 mg BID- decreased for kidney function      Prior visit with dietician: yes  Current diet: Was doing well with her diet, but admits she had some indiscretions during the holidays.     Current monitoring regimen: home blood tests - once daily times daily  Oral recall: 111 this AM     Any episodes of hypoglycemia? Denies  Knows how to correct with 15 grams of carbs- juice, coke, or glucose tablets.    Diabetes Management Status  Statin: Taking  ACE/ARB: Taking  Screening or Prevention Patient's value Goal Complete/Controlled?   HgA1C Testing and Control   Lab Results   Component Value Date    HGBA1C 7.6 (H) 07/17/2020    Annually/Less than 8% No   Lipid profile : 07/17/2020 Annually Yes   LDL control Lab Results   Component Value Date    LDLCALC 59.0 (L) 07/17/2020    Annually/Less than 100 mg/dl  Yes   Nephropathy screening Lab Results   Component Value Date    LABMICR 335.0 01/21/2019     Lab Results   Component Value Date    PROTEINUA 2+ (A) 01/17/2020    Annually Yes   Blood pressure BP Readings from Last 1 Encounters:   08/03/20 (!) 162/68    Less than 140/90 No   Dilated retinal exam : 06/04/2020 Annually Yes   Foot exam   : 11/06/2019 Annually Yes     With regards to the thyroid nodule:  Presents with nodule that was diagnosed by ultrasound     Preexisting thyroid disease?: no     Compressive symptoms: no  Anterior neck pressure?:  no  Dysphagia?: no  Voice changes?: no     Risk Factors:  Radiation to head or neck for any treatment of cancer or exposure to radiation?: no  Personal history of colon or breast cancer?: no  Tobacco use?: no  Family history of thyroid cancer?: no     Symptoms of hyper or hypothyroidism:  None     Previous biopsy results:  Left nodule: Benign - 2016  Right nodule: Benign - 2014     Last ultrasound: 9/2019  FINDINGS:  Thyroid gland has heterogeneous echotexture and normal vascularity.    RIGHT LOBE:  Right lobe of the thyroid measures 5.11 x 1.94 x 1.85 cm.    1.35 x 1.28 x 1.30 cm solid, heterogeneous predominately hypoechoic nodule is seen in the right inferior pole.  This nodule has a spongiform appearance.  Margins are well-defined.  No microcalcifications are seen.  Vascularity is grade 1.  1.45 x 1.40 x 1.35 cm solid, isoechoic nodule is seen in the right mid lobe.  Margins are well-defined.  No microcalcifications are seen.  Vascularity is grade 2.    LEFT LOBE:  Left lobe of the thyroid measures 4.95 x 2.45 x 1.55 cm.  1.22 x 0.83 x 1.02 cm solid, heterogeneous predominately isoechoic nodule is seen in the left superior pole.  Margins are blurred.  No microcalcifications are seen.  Vascularity is grade 1.  1.89 x 2.03 x 1.21 cm solid, heterogeneous nodule is seen in the left mid lobe.  Margins are blurred.  This nodule contains multiple macrocystic areas.  No microcalcifications are seen.  Vascularity is grade 1.    ISTHMUS:  Isthmus measures 0.43 cm.    LYMPH NODES:  No abnormal lymph nodes seen.    COMPARISON:  Neck ultrasound dated 05/01/2018.  Our records indicate benign FNA's of the right and left lobe nodules on 4/2014 and 6/2016 respectively.  When compared to the prior study the above nodules have not changed significantly in size or features.      Impression     1.)  Thyroid gland is upper limits of normal in size with heterogeneous echotexture and normal vascularity  2.) 1.35 x 1.28 x 1.30 cm solid,  "heterogeneous predominately hypoechoic nodule is seen in the right inferior pole  3.) 1.45 x 1.40 x 1.35 cm solid, isoechoic nodule is seen in the right mid lobe  4.) 1.22 x 0.83 x 1.02 cm solid, heterogeneous predominately isoechoic nodule is seen in the left superior pole  5.) 1.89 x 2.03 x 1.21 cm solid, heterogeneous nodule is seen in the left mid lobe  RECOMMENDATIONS:  Recommend repeat thyroid ultrasound in 1-2 years.      With regards to the adrenal incidentaloma:     Was found to have bilateral adrenal nodules found incidentally on CT scan "many" years ago. She reports she was initially having yearly CT scans to follow them, but it was recommended to stop doing them due to lack of growth. There are two CT scans in our system (2016 and 2017). The left nodule increased from 1.7 > 2.0 cm and measures -5.7 HU pre-contrast. The right nodule was the same size in the interval with indeterminate density approx. 20 HU.     She thinks a functional workup was done around the time they were discovered, but I don't have those records.     Pt reports no abdominal discomfort.  No h/o hypertensive emergency. Blood pressure hasn't been difficult to control recently.  Pt denies h/o paroxysmal symptoms such as syncope, pallor, dizziness, palpitations, headaches.      No new HTN or worsening of known HTN   She has diabetes, but has not worsened recently.  Denies polyuria, polydipsia, nocturia, unexplained weight loss or blurred vision     No easy bruisability or abnormal stretch marks. No muscle weakness     With regards to osteoporosis:      Past medication: Alendronate - did not tolerate due to bone pain.  She was also offered prolia, but she read about the side-effects and is not interested in starting it or any other therapy for osteoporosis.     Calcium intake?  1 serving of dairy per day  Vit D intake?  2000 IU a day   Weight bearing exercise?   no  Recent falls? no  Fall Precautions? no     Recent fractures? no  Height " "loss (>2 inches)? no     Tobacco use ?  no  EtOH use? nono     Current diarrhea or h/o malabsorption? no  Chronic steroids? Inhaled only  Anticoagulants? no  Proton Pump Inhibitors? no  Antiseizure medications? no   Thyroid disease? no  Anemia? yes  Kidney Stones? no  Hyperparathyroidism? no     Malignancy involving bone (active or history)? no  Prior radiation treatment? no  Unexplained elevation of alkaline phosphatase? no  Dental work planned? no     Last BMD: 4/2018:  Lumbar Spine: BMD 0.866g/cm2: T-score of -1.6.    Total Hip: BMD 0.817g/cm2. T-score is -1.0, and the Z-score is 1.0.    Femoral neck: Bone mineral density is 0.611g/cm2 and the T-score is -2.1 and the Z-score is 0.1 g/cm2.    There is a 16% risk of a major osteoporotic fracture and a 4.7% risk of hip fracture in the next 10 years (FRAX).    Compared with previous DXA, BMD at the lumbar spine has remained stable, and the BMD at the total hip has declined 4.5%.     Review of Systems   Constitutional: Negative for fatigue.   Eyes: Negative for visual disturbance.   Respiratory: Negative for shortness of breath.    Cardiovascular: Negative for chest pain.   Gastrointestinal: Negative for abdominal pain.   Musculoskeletal: Negative for arthralgias.   Skin: Negative for wound.   Neurological: Negative for headaches.   Hematological: Does not bruise/bleed easily.   Psychiatric/Behavioral: Negative for sleep disturbance.     Objective:   Physical Exam  Vitals signs reviewed.   Constitutional:       Appearance: She is well-developed.   Neck:      Thyroid: Thyromegaly present.   Cardiovascular:      Rate and Rhythm: Normal rate.   Pulmonary:      Effort: Pulmonary effort is normal.   Abdominal:      Palpations: Abdomen is soft.     Appropriate footwear, Foot exam deferred, done 1/2019.    No ulcers or wounds.   injection sites are ok. No lipo hypertropthy or atrophy    Visit Vitals  BP (!) 162/68   Pulse 66   Ht 5' 3" (1.6 m)   Wt 75 kg (165 lb 5.5 oz) "   LMP  (LMP Unknown)   BMI 29.29 kg/m²      Lab Review:   Lab Results   Component Value Date    HGBA1C 7.6 (H) 07/17/2020     Lab Results   Component Value Date    CHOL 124 07/17/2020    HDL 40 07/17/2020    LDLCALC 59.0 (L) 07/17/2020    TRIG 125 07/17/2020    CHOLHDL 32.3 07/17/2020     Lab Results   Component Value Date     07/17/2020    K 4.6 07/17/2020     07/17/2020    CO2 26 07/17/2020    GLU 68 (L) 07/17/2020    BUN 33 (H) 07/17/2020    CREATININE 1.7 (H) 07/17/2020    CALCIUM 9.5 07/17/2020    PROT 7.0 07/17/2020    ALBUMIN 3.4 (L) 07/17/2020    BILITOT 0.3 07/17/2020    ALKPHOS 84 07/17/2020    AST 19 07/17/2020    ALT 15 07/17/2020    ANIONGAP 6 (L) 07/17/2020    ESTGFRAFRICA 32.1 (A) 07/17/2020    EGFRNONAA 27.9 (A) 07/17/2020    TSH 0.447 01/17/2020     Vit D, 25-Hydroxy   Date Value Ref Range Status   07/17/2020 31 30 - 96 ng/mL Final     Comment:     Vitamin D deficiency.........<10 ng/mL                              Vitamin D insufficiency......10-29 ng/mL       Vitamin D sufficiency........> or equal to 30 ng/mL  Vitamin D toxicity............>100 ng/mL       Assessment and Plan     1. Diabetic polyneuropathy associated with type 2 diabetes mellitus  Hemoglobin A1C    Comprehensive metabolic panel   2. Diabetic sensorimotor neuropathy     3. Uncontrolled type 2 diabetes mellitus with diabetic neuropathy, without long-term current use of insulin     4. Essential hypertension     5. Chronic kidney disease, stage 3, mod decreased GFR     6. Nontoxic multinodular goiter  TSH   7. Adrenal cortical nodule: repeat CT 6/2016     8. Osteoporosis, unspecified osteoporosis type, unspecified pathological fracture presence     9. Hyperlipidemia, unspecified hyperlipidemia type       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, with long-term current use of insulin  Followed by ophthalmology for eye injections. See above regarding  diabetes.     Type 2 diabetes mellitus with stage 3 chronic kidney disease, with long-term current use of insulin  -- A1c has improved since last visit closer to goal of 8%, without hypoglycemia   Reviewed goals of therapy are to get the best control we can without hypoglycemia.   Medication changes:   Continue: Lantus 34 units nightly  Metformin 500 mg BID - need to monitor GFR - low threshold to stop if GFR continues to worsen.  Tradjenta 5 mg daily  Reviewed patient's current insulin regimen.   Advised frequent self blood glucose monitoring. Patient encouraged to document glucose results and bring them to every clinic visit.  Hypoglycemia precautions discussed. Instructed on precautions before driving.    Close adherence to lifestyle changes recommended.       Kidneys: Urine microalbumin/Cr elevated - has nephrology follow-up later this month.     Adrenal cortical nodule: repeat CT 6/2016  Stable on imaging for over a decade suggests benign adenomas. She thinks she had a biochemical workup in the past, but the results aren't available in our system as they predate 2004. Blood pressure is reasonably well-controlled. No hypokalemia. No symptoms to suggest pheo, and no overt Cushing syndrome. Subclinical Cushing is a possibility, but the benefit vs. risk of adrenalectomy would very likely favor conservative management. Therefore, will hold off on further testing at this point since it would be unlikely to alter management.     Nontoxic multinodular goiter  Repeat ultrasound in 9/2021.  Denies compressive symptoms    Hyperlipidemia  On simvastatin  Managed by Dr. Gaston     Osteoporosis  Patient does not wish to pursue antiresorptive treatments citing previous side-effects and wanting to avoid new medications due to fear of side-effects (Prolia specifically).  LONG DISCUSSION about fall precautions and decrease in ADL's/indepndence if she would fracture.  Discussed prolia in depth, MOA & possible SE's  Pamphlet given  on prolia- she will think about it and let me know.     Taking vitamin D 2000 IU daily and she gets calcium in her diet.    Walking for weight bearing exercise.     Repeat BMD next year per pt request.     HTN  -managed by dr fowler    Follow up in about 6 months (around 2/3/2021).   Labs prior to next appointment

## 2020-07-24 NOTE — PROGRESS NOTES
Subjective:       Patient ID:  Myesha Quinones is a 80 y.o. female who presents for   Chief Complaint   Patient presents with    Skin Check     ubse    Lesion     mid back      History of Present Illness: The patient presents for follow up of skin check.    The patient was last seen on: 12/3/19 for cryosurgery to actinic keratoses which have resolved and ordered PDT which pt did not get done and bx of bcc left ant scalp - excised by SSW  This is a high risk patient here to check for the development of new lesions.      Other skin complaints: scaling on forehead and lesion mid back x 2+ years + itches. No prev treatment        Review of Systems   Skin: Positive for daily sunscreen use (makeup with sunscreen) and activity-related sunscreen use. Negative for recent sunburn.   Hematologic/Lymphatic: Bruises/bleeds easily.        Objective:    Physical Exam   Constitutional: She appears well-developed and well-nourished. No distress.   Neurological: She is alert and oriented to person, place, and time. She is not disoriented.   Psychiatric: She has a normal mood and affect.   Skin:   Areas Examined (abnormalities noted in diagram):   Scalp / Hair Palpated and Inspected  Head / Face Inspection Performed  Neck Inspection Performed  Chest / Axilla Inspection Performed  Back Inspection Performed  RUE Inspected  LUE Inspection Performed  Nails and Digits Inspection Performed                       Diagram Legend     Erythematous scaling macule/papule c/w actinic keratosis       Vascular papule c/w angioma      Pigmented verrucoid papule/plaque c/w seborrheic keratosis      Yellow umbilicated papule c/w sebaceous hyperplasia      Irregularly shaped tan macule c/w lentigo     1-2 mm smooth white papules consistent with Milia      Movable subcutaneous cyst with punctum c/w epidermal inclusion cyst      Subcutaneous movable cyst c/w pilar cyst      Firm pink to brown papule c/w dermatofibroma      Pedunculated fleshy  papule(s) c/w skin tag(s)      Evenly pigmented macule c/w junctional nevus     Mildly variegated pigmented, slightly irregular-bordered macule c/w mildly atypical nevus      Flesh colored to evenly pigmented papule c/w intradermal nevus       Pink pearly papule/plaque c/w basal cell carcinoma      Erythematous hyperkeratotic cursted plaque c/w SCC      Surgical scar with no sign of skin cancer recurrence      Open and closed comedones      Inflammatory papules and pustules      Verrucoid papule consistent consistent with wart     Erythematous eczematous patches and plaques     Dystrophic onycholytic nail with subungual debris c/w onychomycosis     Umbilicated papule    Erythematous-base heme-crusted tan verrucoid plaque consistent with inflamed seborrheic keratosis     Erythematous Silvery Scaling Plaque c/w Psoriasis     See annotation      Assessment / Plan:        AK (actinic keratosis)  Cryosurgery Procedure Note    Verbal consent from the patient is obtained including, but not limited to, risk of hypopigmentation/hyperpigmentation, scar, recurrence of lesion. The patient is aware of the precancerous quality and need for treatment of these lesions. Liquid nitrogen cryosurgery is applied to the 3 actinic keratoses, as detailed in the physical exam, to produce a freeze injury. The patient is aware that blisters may form and is instructed on wound care with gentle cleansing and use of vaseline ointment to keep moist until healed. The patient is supplied a handout on cryosurgery and is instructed to call if lesions do not completely resolve.    And PDT face 90 min    SK (seborrheic keratosis)   - stable and chronic    Lentigo   - stable and chronic      History of nonmelanoma skin cancer  Area(s) of previous NMSC evaluated with no signs of recurrence.    Upper body skin examination performed today including at least 6 points as noted in physical examination. No lesions suspicious for malignancy noted.                Follow up in about 3 months (around 10/24/2020) for after PDT.

## 2020-07-27 ENCOUNTER — OFFICE VISIT (OUTPATIENT)
Dept: FAMILY MEDICINE | Facility: CLINIC | Age: 80
End: 2020-07-27
Payer: MEDICARE

## 2020-07-27 VITALS
BODY MASS INDEX: 27.93 KG/M2 | HEIGHT: 63 IN | TEMPERATURE: 98 F | DIASTOLIC BLOOD PRESSURE: 60 MMHG | WEIGHT: 157.63 LBS | OXYGEN SATURATION: 98 % | HEART RATE: 85 BPM | SYSTOLIC BLOOD PRESSURE: 122 MMHG

## 2020-07-27 DIAGNOSIS — E11.42 DIABETIC POLYNEUROPATHY ASSOCIATED WITH TYPE 2 DIABETES MELLITUS: ICD-10-CM

## 2020-07-27 DIAGNOSIS — N18.4 CKD (CHRONIC KIDNEY DISEASE) STAGE 4, GFR 15-29 ML/MIN: ICD-10-CM

## 2020-07-27 DIAGNOSIS — E13.311 MACULAR EDEMA DUE TO SECONDARY DIABETES: ICD-10-CM

## 2020-07-27 DIAGNOSIS — E66.3 OVERWEIGHT (BMI 25.0-29.9): ICD-10-CM

## 2020-07-27 DIAGNOSIS — Z00.00 ENCOUNTER FOR PREVENTIVE HEALTH EXAMINATION: Primary | ICD-10-CM

## 2020-07-27 DIAGNOSIS — E11.69 HYPERLIPIDEMIA ASSOCIATED WITH TYPE 2 DIABETES MELLITUS: ICD-10-CM

## 2020-07-27 DIAGNOSIS — I15.2 HYPERTENSION ASSOCIATED WITH DIABETES: ICD-10-CM

## 2020-07-27 DIAGNOSIS — J44.9 CHRONIC OBSTRUCTIVE PULMONARY DISEASE, UNSPECIFIED COPD TYPE: ICD-10-CM

## 2020-07-27 DIAGNOSIS — I70.0 CALCIFICATION OF AORTA: ICD-10-CM

## 2020-07-27 DIAGNOSIS — E78.5 HYPERLIPIDEMIA ASSOCIATED WITH TYPE 2 DIABETES MELLITUS: ICD-10-CM

## 2020-07-27 DIAGNOSIS — D32.9 MENINGIOMA: ICD-10-CM

## 2020-07-27 DIAGNOSIS — E13.3299 MILD NONPROLIFERATIVE RETINOPATHY DUE TO SECONDARY DIABETES: ICD-10-CM

## 2020-07-27 DIAGNOSIS — E11.59 HYPERTENSION ASSOCIATED WITH DIABETES: ICD-10-CM

## 2020-07-27 DIAGNOSIS — E11.65 UNCONTROLLED TYPE 2 DIABETES MELLITUS WITH HYPERGLYCEMIA: ICD-10-CM

## 2020-07-27 PROBLEM — I12.9 TYPE 2 DM WITH CKD STAGE 3 AND HYPERTENSION: Status: RESOLVED | Noted: 2019-01-21 | Resolved: 2020-07-27

## 2020-07-27 PROBLEM — Z79.4 TYPE 2 DIABETES MELLITUS WITH STAGE 3 CHRONIC KIDNEY DISEASE, WITH LONG-TERM CURRENT USE OF INSULIN: Status: RESOLVED | Noted: 2017-01-26 | Resolved: 2020-07-27

## 2020-07-27 PROBLEM — E11.22 TYPE 2 DIABETES MELLITUS WITH STAGE 3 CHRONIC KIDNEY DISEASE, WITH LONG-TERM CURRENT USE OF INSULIN: Status: RESOLVED | Noted: 2017-01-26 | Resolved: 2020-07-27

## 2020-07-27 PROBLEM — N18.30 TYPE 2 DM WITH CKD STAGE 3 AND HYPERTENSION: Status: RESOLVED | Noted: 2019-01-21 | Resolved: 2020-07-27

## 2020-07-27 PROBLEM — J18.9 PARAPNEUMONIC EFFUSION: Status: RESOLVED | Noted: 2019-12-18 | Resolved: 2020-07-27

## 2020-07-27 PROBLEM — N18.30 CHRONIC KIDNEY DISEASE, STAGE 3, MOD DECREASED GFR: Status: RESOLVED | Noted: 2017-02-14 | Resolved: 2020-07-27

## 2020-07-27 PROBLEM — N18.30 TYPE 2 DIABETES MELLITUS WITH STAGE 3 CHRONIC KIDNEY DISEASE, WITH LONG-TERM CURRENT USE OF INSULIN: Status: RESOLVED | Noted: 2017-01-26 | Resolved: 2020-07-27

## 2020-07-27 PROBLEM — J15.1 PSEUDOMONAS PNEUMONIA: Status: RESOLVED | Noted: 2019-12-07 | Resolved: 2020-07-27

## 2020-07-27 PROBLEM — E11.22 TYPE 2 DM WITH CKD STAGE 3 AND HYPERTENSION: Status: RESOLVED | Noted: 2019-01-21 | Resolved: 2020-07-27

## 2020-07-27 PROBLEM — J91.8 PARAPNEUMONIC EFFUSION: Status: RESOLVED | Noted: 2019-12-18 | Resolved: 2020-07-27

## 2020-07-27 PROCEDURE — 3051F HG A1C>EQUAL 7.0%<8.0%: CPT | Mod: HCNC,CPTII,S$GLB, | Performed by: NURSE PRACTITIONER

## 2020-07-27 PROCEDURE — 3074F PR MOST RECENT SYSTOLIC BLOOD PRESSURE < 130 MM HG: ICD-10-PCS | Mod: HCNC,CPTII,S$GLB, | Performed by: NURSE PRACTITIONER

## 2020-07-27 PROCEDURE — 99999 PR PBB SHADOW E&M-EST. PATIENT-LVL V: CPT | Mod: PBBFAC,HCNC,, | Performed by: NURSE PRACTITIONER

## 2020-07-27 PROCEDURE — 3078F DIAST BP <80 MM HG: CPT | Mod: HCNC,CPTII,S$GLB, | Performed by: NURSE PRACTITIONER

## 2020-07-27 PROCEDURE — 3078F PR MOST RECENT DIASTOLIC BLOOD PRESSURE < 80 MM HG: ICD-10-PCS | Mod: HCNC,CPTII,S$GLB, | Performed by: NURSE PRACTITIONER

## 2020-07-27 PROCEDURE — 99999 PR PBB SHADOW E&M-EST. PATIENT-LVL V: ICD-10-PCS | Mod: PBBFAC,HCNC,, | Performed by: NURSE PRACTITIONER

## 2020-07-27 PROCEDURE — 99397 PER PM REEVAL EST PAT 65+ YR: CPT | Mod: HCNC,S$GLB,, | Performed by: NURSE PRACTITIONER

## 2020-07-27 PROCEDURE — 99397 PR PREVENTIVE VISIT,EST,65 & OVER: ICD-10-PCS | Mod: HCNC,S$GLB,, | Performed by: NURSE PRACTITIONER

## 2020-07-27 PROCEDURE — 3051F PR MOST RECENT HEMOGLOBIN A1C LEVEL 7.0 - < 8.0%: ICD-10-PCS | Mod: HCNC,CPTII,S$GLB, | Performed by: NURSE PRACTITIONER

## 2020-07-27 PROCEDURE — 3074F SYST BP LT 130 MM HG: CPT | Mod: HCNC,CPTII,S$GLB, | Performed by: NURSE PRACTITIONER

## 2020-07-27 RX ORDER — LINAGLIPTIN 5 MG/1
5 TABLET, FILM COATED ORAL DAILY
Qty: 90 TABLET | Refills: 3 | Status: SHIPPED | OUTPATIENT
Start: 2020-07-27 | End: 2021-03-15 | Stop reason: SDUPTHER

## 2020-07-27 NOTE — PATIENT INSTRUCTIONS
Counseling and Referral of Other Preventative  (Italic type indicates deductible and co-insurance are waived)    Patient Name: Myesha Quinones  Today's Date: 7/27/2020    Health Maintenance       Date Due Completion Date    Shingles Vaccine (2 of 3) 05/28/2009 4/2/2009    DEXA SCAN 04/30/2020 4/30/2018    Influenza Vaccine (1) 09/01/2020 9/17/2019    Foot Exam 11/06/2020 11/6/2019    Override on 6/4/2015: Done    Hemoglobin A1c 01/17/2021 7/17/2020    Eye Exam 06/04/2021 6/4/2020    Lipid Panel 07/17/2021 7/17/2020    Colonoscopy 11/05/2022 11/5/2019    TETANUS VACCINE 07/22/2024 7/22/2014        No orders of the defined types were placed in this encounter.    The following information is provided to all patients.  This information is to help you find resources for any of the problems found today that may be affecting your health:                Living healthy guide: www.[facility].[place].[facility]      Understanding Diabetes: www.diabetes.org      Eating healthy: www.cdc.gov/healthyweight      [facility] home safety checklist: www.cdc.gov/steadi/patient.html      Agency on Aging: www.goea.[place].gov      Alcoholics anonymous (AA): www.aa.org      Physical Activity: www.oanh.nih.gov/ku6yymm      Tobacco use: www.quitwithusla.org

## 2020-07-27 NOTE — PROGRESS NOTES
"  Myesha Quinones presented for a  Medicare AWV and comprehensive Health Risk Assessment today. The following components were reviewed and updated:    · Medical history  · Family History  · Social history  · Allergies and Current Medications  · Health Risk Assessment  · Health Maintenance  · Care Team     ** See Completed Assessments for Annual Wellness Visit within the encounter summary.**         The following assessments were completed:  · Living Situation  · CAGE  · Depression Screening  · Timed Get Up and Go  · Whisper Test  · Cognitive Function Screening  · Nutrition Screening  · ADL Screening  · PAQ Screening        Vitals:    07/27/20 0815   BP: 122/60   BP Location: Left arm   Patient Position: Sitting   BP Method: Medium (Manual)   Pulse: 85   Temp: 98 °F (36.7 °C)   TempSrc: Temporal   SpO2: 98%   Weight: 71.5 kg (157 lb 10.1 oz)   Height: 5' 3" (1.6 m)     Body mass index is 27.92 kg/m².     Physical Exam  Vitals signs and nursing note reviewed.   Constitutional:       General: She is not in acute distress.     Appearance: She is well-developed.   HENT:      Head: Normocephalic and atraumatic.   Eyes:      Pupils: Pupils are equal, round, and reactive to light.   Neck:      Musculoskeletal: Neck supple.      Vascular: No JVD.      Trachea: No tracheal deviation.   Cardiovascular:      Rate and Rhythm: Normal rate and regular rhythm.      Heart sounds: Normal heart sounds. No murmur.   Pulmonary:      Effort: Pulmonary effort is normal. No respiratory distress.      Breath sounds: Normal breath sounds. No wheezing or rales.   Abdominal:      General: Bowel sounds are normal. There is no distension.      Palpations: Abdomen is soft. There is no mass.      Tenderness: There is no abdominal tenderness.   Musculoskeletal: Normal range of motion.         General: No tenderness.   Skin:     General: Skin is warm and dry.      Coloration: Skin is not pale.      Findings: No erythema.   Neurological:      " Mental Status: She is alert and oriented to person, place, and time.      Coordination: Coordination normal.   Psychiatric:         Behavior: Behavior normal.         Thought Content: Thought content normal. Thought content does not include homicidal or suicidal ideation.         Judgment: Judgment normal.           Diagnoses and health risks identified today and associated recommendations/orders:    1. Encounter for preventive health examination    2. Uncontrolled type 2 diabetes mellitus with hyperglycemia  Chronic; stable on medication.  Followed by Endocrinology.  - linaGLIPtin (TRADJENTA) 5 mg Tab tablet; Take 1 tablet (5 mg total) by mouth once daily.  Dispense: 90 tablet; Refill: 3    3. Diabetic polyneuropathy associated with type 2 diabetes mellitus  Chronic; stable.  Followed by Endocrinology.    4. Hypertension associated with diabetes  Chronic; stable on medication.  Followed by Cardiology.    5. Hyperlipidemia associated with type 2 diabetes mellitus  Chronic; stable on medication.  Followed by Cardiology.    6. Calcification of aorta  Chronic; stable.  Followed by Cardiology.    7. Mild nonproliferative retinopathy due to secondary diabetes  Chronic; stable.  Followed by Opthalmology.    8. Macular edema due to secondary diabetes  Chronic; stable.  Followed by Opthalmology.    9. CKD (chronic kidney disease) stage 4, GFR 15-29 ml/min  Chronic; stable.  Followed by Nephrology.    10. Chronic obstructive pulmonary disease, unspecified COPD type  Chronic; stable on medication.  Followed by PCP.    11. Meningioma  Chronic; stable.  Followed by PCP.    12. Overweight (BMI 25.0-29.9)  Chronic, stable. Therapeutic lifestyle changes discussed. Followed by PCP.      Provided Myesha with a 5-10 year written screening schedule and personal prevention plan. Recommendations were developed using the USPSTF age appropriate recommendations. Education, counseling, and referrals were provided as needed. After Visit  Summary printed and given to patient which includes a list of additional screenings\tests needed.    Follow up for Annual Wellness Visit in 1 year.    Sana Muñoz NP         I offered to discuss end of life issues, including information on how to make advance directives that the patient could use to name someone who would make medical decisions on their behalf if they became too ill to make themselves.    ___Patient declined  _X_Patient is interested, I provided paper work and offered to discuss.

## 2020-08-03 ENCOUNTER — OFFICE VISIT (OUTPATIENT)
Dept: ENDOCRINOLOGY | Facility: CLINIC | Age: 80
End: 2020-08-03
Payer: MEDICARE

## 2020-08-03 VITALS
HEIGHT: 63 IN | HEART RATE: 66 BPM | SYSTOLIC BLOOD PRESSURE: 162 MMHG | DIASTOLIC BLOOD PRESSURE: 68 MMHG | BODY MASS INDEX: 29.3 KG/M2 | WEIGHT: 165.38 LBS

## 2020-08-03 DIAGNOSIS — E27.8 ADRENAL CORTICAL NODULE: ICD-10-CM

## 2020-08-03 DIAGNOSIS — E11.42 DIABETIC SENSORIMOTOR NEUROPATHY: ICD-10-CM

## 2020-08-03 DIAGNOSIS — I10 ESSENTIAL HYPERTENSION: ICD-10-CM

## 2020-08-03 DIAGNOSIS — E78.5 HYPERLIPIDEMIA, UNSPECIFIED HYPERLIPIDEMIA TYPE: ICD-10-CM

## 2020-08-03 DIAGNOSIS — M81.0 OSTEOPOROSIS, UNSPECIFIED OSTEOPOROSIS TYPE, UNSPECIFIED PATHOLOGICAL FRACTURE PRESENCE: ICD-10-CM

## 2020-08-03 DIAGNOSIS — N18.30 CHRONIC KIDNEY DISEASE, STAGE 3, MOD DECREASED GFR: ICD-10-CM

## 2020-08-03 DIAGNOSIS — E04.2 NONTOXIC MULTINODULAR GOITER: ICD-10-CM

## 2020-08-03 DIAGNOSIS — E11.42 DIABETIC POLYNEUROPATHY ASSOCIATED WITH TYPE 2 DIABETES MELLITUS: Primary | ICD-10-CM

## 2020-08-03 PROCEDURE — 3051F HG A1C>EQUAL 7.0%<8.0%: CPT | Mod: HCNC,CPTII,S$GLB, | Performed by: NURSE PRACTITIONER

## 2020-08-03 PROCEDURE — 99214 OFFICE O/P EST MOD 30 MIN: CPT | Mod: HCNC,S$GLB,, | Performed by: NURSE PRACTITIONER

## 2020-08-03 PROCEDURE — 99999 PR PBB SHADOW E&M-EST. PATIENT-LVL V: ICD-10-PCS | Mod: PBBFAC,HCNC,, | Performed by: NURSE PRACTITIONER

## 2020-08-03 PROCEDURE — 1159F PR MEDICATION LIST DOCUMENTED IN MEDICAL RECORD: ICD-10-PCS | Mod: HCNC,S$GLB,, | Performed by: NURSE PRACTITIONER

## 2020-08-03 PROCEDURE — 1126F PR PAIN SEVERITY QUANTIFIED, NO PAIN PRESENT: ICD-10-PCS | Mod: HCNC,S$GLB,, | Performed by: NURSE PRACTITIONER

## 2020-08-03 PROCEDURE — 99214 PR OFFICE/OUTPT VISIT, EST, LEVL IV, 30-39 MIN: ICD-10-PCS | Mod: HCNC,S$GLB,, | Performed by: NURSE PRACTITIONER

## 2020-08-03 PROCEDURE — 99999 PR PBB SHADOW E&M-EST. PATIENT-LVL V: CPT | Mod: PBBFAC,HCNC,, | Performed by: NURSE PRACTITIONER

## 2020-08-03 PROCEDURE — 3077F SYST BP >= 140 MM HG: CPT | Mod: HCNC,CPTII,S$GLB, | Performed by: NURSE PRACTITIONER

## 2020-08-03 PROCEDURE — 1126F AMNT PAIN NOTED NONE PRSNT: CPT | Mod: HCNC,S$GLB,, | Performed by: NURSE PRACTITIONER

## 2020-08-03 PROCEDURE — 3051F PR MOST RECENT HEMOGLOBIN A1C LEVEL 7.0 - < 8.0%: ICD-10-PCS | Mod: HCNC,CPTII,S$GLB, | Performed by: NURSE PRACTITIONER

## 2020-08-03 PROCEDURE — 1101F PT FALLS ASSESS-DOCD LE1/YR: CPT | Mod: HCNC,CPTII,S$GLB, | Performed by: NURSE PRACTITIONER

## 2020-08-03 PROCEDURE — 1159F MED LIST DOCD IN RCRD: CPT | Mod: HCNC,S$GLB,, | Performed by: NURSE PRACTITIONER

## 2020-08-03 PROCEDURE — 3077F PR MOST RECENT SYSTOLIC BLOOD PRESSURE >= 140 MM HG: ICD-10-PCS | Mod: HCNC,CPTII,S$GLB, | Performed by: NURSE PRACTITIONER

## 2020-08-03 PROCEDURE — 3078F PR MOST RECENT DIASTOLIC BLOOD PRESSURE < 80 MM HG: ICD-10-PCS | Mod: HCNC,CPTII,S$GLB, | Performed by: NURSE PRACTITIONER

## 2020-08-03 PROCEDURE — 1101F PR PT FALLS ASSESS DOC 0-1 FALLS W/OUT INJ PAST YR: ICD-10-PCS | Mod: HCNC,CPTII,S$GLB, | Performed by: NURSE PRACTITIONER

## 2020-08-03 PROCEDURE — 3078F DIAST BP <80 MM HG: CPT | Mod: HCNC,CPTII,S$GLB, | Performed by: NURSE PRACTITIONER

## 2020-08-04 ENCOUNTER — TELEPHONE (OUTPATIENT)
Dept: DERMATOLOGY | Facility: CLINIC | Age: 80
End: 2020-08-04

## 2020-08-16 ENCOUNTER — CLINICAL SUPPORT (OUTPATIENT)
Dept: CARDIOLOGY | Facility: HOSPITAL | Age: 80
End: 2020-08-16
Attending: INTERNAL MEDICINE
Payer: MEDICARE

## 2020-08-16 DIAGNOSIS — Z95.810 AICD (AUTOMATIC CARDIOVERTER/DEFIBRILLATOR) PRESENT: ICD-10-CM

## 2020-08-16 PROCEDURE — 93296 REM INTERROG EVL PM/IDS: CPT | Mod: HCNC | Performed by: INTERNAL MEDICINE

## 2020-08-16 PROCEDURE — 93295 DEV INTERROG REMOTE 1/2/MLT: CPT | Mod: ,,, | Performed by: INTERNAL MEDICINE

## 2020-08-16 PROCEDURE — 93295 CARDIAC DEVICE CHECK - REMOTE: ICD-10-PCS | Mod: ,,, | Performed by: INTERNAL MEDICINE

## 2020-08-17 ENCOUNTER — PATIENT OUTREACH (OUTPATIENT)
Dept: OTHER | Facility: OTHER | Age: 80
End: 2020-08-17

## 2020-08-24 ENCOUNTER — LAB VISIT (OUTPATIENT)
Dept: LAB | Facility: HOSPITAL | Age: 80
End: 2020-08-24
Attending: INTERNAL MEDICINE
Payer: MEDICARE

## 2020-08-24 ENCOUNTER — TELEPHONE (OUTPATIENT)
Dept: INTERNAL MEDICINE | Facility: CLINIC | Age: 80
End: 2020-08-24

## 2020-08-24 DIAGNOSIS — I12.9 CKD STAGE 4 SECONDARY TO HYPERTENSION: ICD-10-CM

## 2020-08-24 DIAGNOSIS — N18.4 CKD STAGE 4 SECONDARY TO HYPERTENSION: ICD-10-CM

## 2020-08-24 LAB
ANION GAP SERPL CALC-SCNC: 9 MMOL/L (ref 8–16)
BUN SERPL-MCNC: 27 MG/DL (ref 8–23)
CALCIUM SERPL-MCNC: 9.1 MG/DL (ref 8.7–10.5)
CHLORIDE SERPL-SCNC: 107 MMOL/L (ref 95–110)
CO2 SERPL-SCNC: 24 MMOL/L (ref 23–29)
CREAT SERPL-MCNC: 1.5 MG/DL (ref 0.5–1.4)
EST. GFR  (AFRICAN AMERICAN): 37.4 ML/MIN/1.73 M^2
EST. GFR  (NON AFRICAN AMERICAN): 32.4 ML/MIN/1.73 M^2
GLUCOSE SERPL-MCNC: 51 MG/DL (ref 70–110)
POTASSIUM SERPL-SCNC: 4.3 MMOL/L (ref 3.5–5.1)
SODIUM SERPL-SCNC: 140 MMOL/L (ref 136–145)

## 2020-08-24 PROCEDURE — 80048 BASIC METABOLIC PNL TOTAL CA: CPT | Mod: HCNC

## 2020-08-24 PROCEDURE — 36415 COLL VENOUS BLD VENIPUNCTURE: CPT | Mod: HCNC,PO

## 2020-08-26 ENCOUNTER — TELEPHONE (OUTPATIENT)
Dept: DERMATOLOGY | Facility: CLINIC | Age: 80
End: 2020-08-26

## 2020-08-26 NOTE — PROGRESS NOTES
"Digital Medicine: Health  Follow-Up    The history is provided by the patient.             Reason for review: Blood pressure not at goal        Topics Covered on Call: physical activity and Diet    Additional Follow-up details: Patient states, "my blood pressure reading are higher due to recent vacation":         Diet-no change to diet    No change to diet.  Patient reports eating or drinking the following: Patient reports overall no change to dietary habits. Patient did just return from vacation in [place]. Patient states, My eating habits were a little off then but I am back on track".       Physical Activity-Change      Additional physical activity details: Patient reports she is currently not participating in physical activity. Patient declined at home resources/ ideas.       Medication Adherence-Medication adherence was assessed.      Substance, Sleep, Stress-Not assessed      Continue current diet/physical activity routine.       Addressed any questions or concerns and patient has my contact information if needed prior to next outreach. Patient verbalizes understanding.      Explained the importance of self-monitoring and medication adherence. Encouraged the patient to communicate with their health  for lifestyle modifications to help improve or maintain a healthy lifestyle.            There are no preventive care reminders to display for this patient.    Last 5 Patient Entered Readings                                      Current 30 Day Average: 153/69     Recent Readings 8/24/2020 8/19/2020 8/10/2020 8/7/2020 8/5/2020    SBP (mmHg) 155 162 151 156 155    DBP (mmHg) 72 76 70 72 67    Pulse 75 86 76 83 73               "

## 2020-08-31 NOTE — PROGRESS NOTES
Digital Medicine: Clinician Follow-Up    Called patient to introduce myself as new [facility] clinician. Patient states she was told to cut her tablets in half and take chlorthalidone 12.5 mg daily. Confirms other medications. She states she is unsure of what contributes to elevated readings.    The history is provided by the patient.   Follow-up reason(s): routine follow up.     Hypertension    Patient readings are stable Patient is not experiencing signs/symptoms of hypertension.          Last 5 Patient Entered Readings                                      Current 30 Day Average: 153/70     Recent Readings 8/24/2020 8/19/2020 8/10/2020 8/7/2020 8/5/2020    SBP (mmHg) 155 162 151 156 155    DBP (mmHg) 72 76 70 72 67    Pulse 75 86 76 83 73             Screenings    ASSESSMENT(S)  Patients BP average is 153/70 mmHg, which is above goal. Patient's BP goal is less than or equal to 140/90 per 2017 ACC/AHA Hypertension Guidelines.       Hypertension Plan  Continue current therapy.  Provided patient education.  Encourage patient to review directions on pill bottle and resume chlorthalidone 25 mg daily as prescribed by Dr. Gaston if appropriate at next outreach. Renal function improved on 8/24/2020 BMP.       Addressed any questions or concerns and patient has my contact information if needed prior to next outreach. Patient verbalizes understanding.            There are no preventive care reminders to display for this patient.      Hypertension Medications             carvediloL (COREG) 25 MG tablet TAKE 2 TABLETS (50 MG TOTAL) BY MOUTH 2 (TWO) TIMES DAILY.    chlorthalidone (HYGROTEN) 25 MG Tab Take 1 tablet (25 mg total) by mouth once daily.    cloNIDine 0.1 mg/24 hr td ptwk (CATAPRES) 0.1 mg/24 hr PLACE 1 PATCH ONTO THE SKIN EVERY 7 DAYS.    diltiaZEM (CARDIZEM CD) 120 MG Cp24 TAKE 1 CAPSULE (120 MG TOTAL) BY MOUTH ONCE DAILY.    hydrALAZINE (APRESOLINE) 100 MG tablet Take 1 tablet (100 mg total) by mouth 3 (three) times  daily.    irbesartan (AVAPRO) 300 MG tablet Take 1 tablet (300 mg total) by mouth every evening.    nitroGLYCERIN (NITROSTAT) 0.4 MG SL tablet Place 0.4 mg under the tongue every 5 (five) minutes as needed for Chest pain.

## 2020-09-08 ENCOUNTER — PATIENT OUTREACH (OUTPATIENT)
Dept: ADMINISTRATIVE | Facility: OTHER | Age: 80
End: 2020-09-08

## 2020-09-10 ENCOUNTER — OFFICE VISIT (OUTPATIENT)
Dept: OPTOMETRY | Facility: CLINIC | Age: 80
End: 2020-09-10
Payer: MEDICARE

## 2020-09-10 DIAGNOSIS — E13.3299 MILD NONPROLIFERATIVE RETINOPATHY DUE TO SECONDARY DIABETES: Primary | ICD-10-CM

## 2020-09-10 DIAGNOSIS — Z13.5 SCREENING FOR GLAUCOMA: ICD-10-CM

## 2020-09-10 DIAGNOSIS — H52.4 PRESBYOPIA: ICD-10-CM

## 2020-09-10 PROCEDURE — 92015 DETERMINE REFRACTIVE STATE: CPT | Mod: HCNC,S$GLB,, | Performed by: OPTOMETRIST

## 2020-09-10 PROCEDURE — 92012 PR EYE EXAM, EST PATIENT,INTERMED: ICD-10-PCS | Mod: HCNC,S$GLB,, | Performed by: OPTOMETRIST

## 2020-09-10 PROCEDURE — 99999 PR PBB SHADOW E&M-EST. PATIENT-LVL IV: ICD-10-PCS | Mod: PBBFAC,HCNC,, | Performed by: OPTOMETRIST

## 2020-09-10 PROCEDURE — 92015 PR REFRACTION: ICD-10-PCS | Mod: HCNC,S$GLB,, | Performed by: OPTOMETRIST

## 2020-09-10 PROCEDURE — 92012 INTRM OPH EXAM EST PATIENT: CPT | Mod: HCNC,S$GLB,, | Performed by: OPTOMETRIST

## 2020-09-10 PROCEDURE — 99999 PR PBB SHADOW E&M-EST. PATIENT-LVL IV: CPT | Mod: PBBFAC,HCNC,, | Performed by: OPTOMETRIST

## 2020-09-10 NOTE — PROGRESS NOTES
HPI     DLS: 6/4/2020 surendra --has follow up with him next month  Pt states no [facility] problems  with her glasses  No f/f  No gtts  LBS: 81  Hemoglobin A1C       Date                     Value               Ref Range             Status                07/17/2020               7.6 (H)             4.0 - 5.6 %           Final                      02/13/2020               7.1 (H)             4.0 - 5.6 %           Final                      01/17/2020               6.7 (H)             4.0 - 5.6 %           Final                    Last edited by Boaz Young, OD on 9/10/2020  9:09 AM. (History)        ROS     Positive for: Endocrine (DM), Eyes (cat surgery OU/injection hx)    Negative for: Constitutional, Gastrointestinal, Neurological, Skin,   Genitourinary, Musculoskeletal, HENT, Cardiovascular, Respiratory,   Psychiatric, Allergic/Imm, Heme/Lymph    Last edited by Boaz Young, OD on 9/10/2020  9:09 AM. (History)        Assessment /Plan     For exam results, see Encounter Report.    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 Mild pco sp pciol ou--pt wishes new spex Rx  2. fam hx glauc  3. DM- with hx NPDR OS/ MAs /exudate in mac/CSME sp inj --see retina notes  4.  ocular migraine Hx  5. DFE deferred since has Dr Arechiga appt next month    PLAN:    1. Wrote new spex Rx  2. rtc as sched w Dr Arechiga, and 1 yr to me

## 2020-09-12 ENCOUNTER — HOSPITAL ENCOUNTER (EMERGENCY)
Facility: HOSPITAL | Age: 80
Discharge: HOME | End: 2020-09-12
Attending: EMERGENCY MEDICINE
Payer: MEDICARE

## 2020-09-12 VITALS
HEIGHT: 63 IN | WEIGHT: 165 LBS | BODY MASS INDEX: 29.23 KG/M2 | RESPIRATION RATE: 20 BRPM | TEMPERATURE: 98 F | OXYGEN SATURATION: 96 % | HEART RATE: 85 BPM | SYSTOLIC BLOOD PRESSURE: 151 MMHG | DIASTOLIC BLOOD PRESSURE: 71 MMHG

## 2020-09-12 DIAGNOSIS — S00.03XA CONTUSION OF SCALP, INITIAL ENCOUNTER: Primary | ICD-10-CM

## 2020-09-12 DIAGNOSIS — S01.01XA LACERATION OF SCALP, INITIAL ENCOUNTER: ICD-10-CM

## 2020-09-12 PROCEDURE — 99284 EMERGENCY DEPT VISIT MOD MDM: CPT | Mod: 25,HCNC

## 2020-09-12 PROCEDURE — 25000003 PHARM REV CODE 250: Mod: HCNC | Performed by: EMERGENCY MEDICINE

## 2020-09-12 PROCEDURE — 12001 RPR S/N/AX/GEN/TRNK 2.5CM/<: CPT | Mod: HCNC

## 2020-09-12 RX ORDER — LIDOCAINE HYDROCHLORIDE AND EPINEPHRINE 10; 10 MG/ML; UG/ML
10 INJECTION, SOLUTION INFILTRATION; PERINEURAL ONCE
Status: COMPLETED | OUTPATIENT
Start: 2020-09-12 | End: 2020-09-12

## 2020-09-12 RX ADMIN — LIDOCAINE HYDROCHLORIDE AND EPINEPHRINE 10 ML: 10; 10 INJECTION, SOLUTION INFILTRATION; PERINEURAL at 07:09

## 2020-09-12 NOTE — ED PROVIDER NOTES
Encounter Date: 9/12/2020      COVID Statement  The  of Health and Human Services and Jovon Cooper, Governor of the [facility], have declared a State of Public Health Emergency due to the spread of a novel coronavirus and disease (COVID-19).  There is no currently accepted treatment except conservative measures and respiratory support if appropriate.  This has lead to significant resource capacity and potential delays in care.       SCRIBE #1 NOTE: I, Neeta Mitchell, am scribing for, and in the presence of,  Dr. Osman . I have scribed the entire note.       History     Chief Complaint   Patient presents with    Fall     pt fell backwards while at [employer] pta and hit her right posterior head on the cement. Heavy bleeding noted. Denies LOC.      81 y/o female with a past medical history of hypertension, diabetes and CAD who presents to the Emergency Department for evaluation s/p a fall that occurred prior to arrival. Pt reports she slipped and fell backwards while getting into a vehicle and hit her head on the cement. She denies any LOC. Patient sustained a laceration to the posterior head and endorses heavy bleeding. Pressure dressing in place and bleeding controlled.  She denies any neck/back pain, lightheadedness or any other injuries. Pt has a prior history of asthma and endorses some mild SOB at this time which is baseline for her. Of note, pt does take a daily baby aspirin.     The history is provided by the patient.     Review of patient's allergies indicates:   Allergen Reactions    Iodine and iodide containing products Hives    Nifedipine      weakness     Past Medical History:   Diagnosis Date    Acute hypoxemic respiratory failure 12/19/2019    Acute right-sided thoracic back pain 12/9/2019    Allergy     Asthma     Basal cell carcinoma     left forehead    Basal cell carcinoma     left nose    Basal cell carcinoma 05/20/2015    right nose    Basal cell carcinoma 12/22/2015     left lower post neck    Basal cell carcinoma 12/03/2019    left ant scalp     Breast cancer     CAD (coronary artery disease)     Cardiomyopathy     Cardiomyopathy, ischemic     Cataract     CHF (congestive heart failure)     Chronic kidney disease, stage 3, mod decreased GFR 2/14/2017    Colon polyp 2011    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, with long-term current use of insulin 2/22/2018    COPD (chronic obstructive pulmonary disease)     COPD exacerbation 4/8/2018    Defibrillator discharge     Diabetes mellitus     Diabetes mellitus type II     Diabetes with neurologic complications     Goiter     MNG    HX: breast cancer     Hyperlipidemia     Hypertension     Iron deficiency anemia 5/16/2017    Left kidney mass     Meningioma     Osteoporosis, postmenopausal     Pacemaker     Pneumonia 12/8/2019    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    Pseudomonas pneumonia     Skin cancer     s/p excision    Sleep apnea     CPAP    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/2015    mid forehead    Unspecified vitamin D deficiency     Ventricular tachycardia     Vitamin B12 deficiency     Vitamin D deficiency disease      Past Surgical History:   Procedure Laterality Date    BASAL CELL CARCINOMA EXCISION      posterior neck and nose    BREAST BIOPSY      BREAST CYST EXCISION Left     BREAST SURGERY      CARDIAC DEFIBRILLATOR PLACEMENT      x 2    CATARACT EXTRACTION W/  INTRAOCULAR LENS IMPLANT Bilateral     CHOLECYSTECTOMY      COLONOSCOPY N/A 11/5/2019    Procedure: COLONOSCOPY;  Surgeon: Boaz Botello MD;  Location: [facility] ([address] [place] [address]);  Service: Endoscopy;  Laterality: N/A;  [facility] [employer] [facility]     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    FRACTURE SURGERY      left elbow and wrist as a child    HYSTERECTOMY      MASTECTOMY Right     REPLACEMENT OF IMPLANTABLE CARDIOVERTER-DEFIBRILLATOR  (ICD) GENERATOR N/A 12/17/2018    Procedure: REPLACEMENT, ICD GENERATOR;  Surgeon: Jan Mckeon MD;  Location: [facility] EP LAB;  Service: Cardiology;  Laterality: N/A;  DONNA, CRT-D gen change, MDT, MAC, SK, 3 Prep    REVISION OF SKIN POCKET FOR CARDIOVERTER-DEFIBRILLATOR  12/17/2018    Procedure: Revision, Skin Pocket, For Cardioverter-Defibrillator;  Surgeon: Jan Mckeon MD;  Location: [facility] EP LAB;  Service: Cardiology;;    SQUAMOUS CELL CARCINOMA EXCISION      remved from forehead    TONSILLECTOMY       Family History   Problem Relation Age of Onset    Diabetes Father     Heart disease Father     Diabetes Sister     Heart disease Sister     Diabetes Brother     Heart disease Brother     Hypertension Brother     Diabetes Brother     Heart disease Brother     Hypertension Brother     Diabetes Brother     Heart disease Brother     Cancer Brother         colon    Diabetes Brother     Cancer Son         skin    Diabetes Son         prediabetes    Diabetes Daughter         prediabetes    Cancer Daughter         melanoma    Obesity Daughter     Melanoma Daughter     Diabetes Son     Asthma Mother     Hypertension Mother     Stroke Mother     No Known Problems Maternal Grandmother     No Known Problems Maternal Grandfather     No Known Problems Paternal Grandmother     No Known Problems Paternal Grandfather     Amblyopia Neg Hx     Blindness Neg Hx     Cataracts Neg Hx     Glaucoma Neg Hx     Macular degeneration Neg Hx     Retinal detachment Neg Hx     Strabismus Neg Hx     Thyroid disease Neg Hx      Social History     Tobacco Use    Smoking status: Never Smoker    Smokeless tobacco: Never Used   Substance Use Topics    Alcohol use: No     Alcohol/week: 0.0 standard drinks    Drug use: No     Review of Systems   Constitutional: Negative for chills and fever.   HENT: Negative for congestion and sore throat.    Eyes: Negative for pain and visual disturbance.   Respiratory: Negative  for cough and shortness of breath.    Cardiovascular: Negative for chest pain.   Gastrointestinal: Negative for nausea and vomiting.   Genitourinary: Negative for dysuria, frequency and urgency.   Musculoskeletal: Negative for back pain and neck pain.   Skin: Positive for wound. Negative for rash.   Neurological: Negative for dizziness, syncope, weakness and light-headedness.   Hematological: Does not bruise/bleed easily.   Psychiatric/Behavioral: Negative for agitation, behavioral problems and confusion.       Physical Exam     Initial Vitals   BP Pulse Resp Temp SpO2   09/12/20 1756 09/12/20 1756 09/12/20 1756 -- 09/12/20 1811   (!) 197/84 85 20  98 %      MAP       --                Physical Exam    Nursing note and vitals reviewed.  Constitutional: She appears well-developed and well-nourished. She is not diaphoretic. No distress.   HENT:   Head: Normocephalic. Head is with contusion and with laceration. Head is without raccoon's eyes and without Harris's sign.       Right Ear: External ear normal.   Left Ear: External ear normal.   Eyes: Conjunctivae, EOM and lids are normal. Pupils are equal, round, and reactive to light.   Neck: Trachea normal, normal range of motion and full passive range of motion without pain.   No cervical spine bony TTP   Cardiovascular: Normal rate, regular rhythm, normal heart sounds and intact distal pulses.   Pulmonary/Chest: No respiratory distress. She has wheezes. She has no rhonchi. She has no rales.   Abdominal: Soft. Normal appearance. There is no abdominal tenderness. There is no rebound and no guarding.   Musculoskeletal: Normal range of motion. No tenderness.   Neurological: She is alert and oriented to person, place, and time. No cranial nerve deficit. GCS score is 15. GCS eye subscore is 4. GCS verbal subscore is 5. GCS motor subscore is 6.   Skin: Skin is warm, dry and intact. Capillary refill takes less than 2 seconds.   Psychiatric: She has a normal mood and affect. Her  speech is normal.         ED Course   Lac Repair    Date/Time: 9/12/2020 7:04 PM  Performed by: Angelica Osman MD  Authorized by: Angelica Osman MD   Body area: head/neck  Location details: scalp  Laceration length: 2 cm  Foreign bodies: no foreign bodies  Tendon involvement: none  Nerve involvement: none  Anesthesia: local infiltration    Anesthesia:  Local Anesthetic: lidocaine 1% with epinephrine  Preparation: Patient was prepped and draped in the usual sterile fashion.  Irrigation solution: saline  Irrigation method: jet lavage  Amount of cleaning: extensive  Debridement: none  Degree of undermining: none  Skin closure: staples  Number of sutures: 4  Approximation: close  Approximation difficulty: simple  Patient tolerance: Patient tolerated the procedure well with no immediate complications        Labs Reviewed - No data to display       Imaging Results          CT Head Without Contrast (Final result)  Result time 09/12/20 18:42:02    Final result by Boaz Ayon MD (09/12/20 18:42:02)                 Impression:      Midline and right posterior parietal and occipital scalp soft tissue swelling/contusion without displaced skull fracture or acute intracranial abnormality identified.    Generalized cerebral volume loss and chronic microvascular ischemic change.    Stable partially calcified extra-axial lesion overlying the left parietal convexity, likely a calcified meningioma.      Electronically signed by: Boaz Ayon MD  Date:    09/12/2020  Time:    18:42             Narrative:    EXAMINATION:  CT HEAD WITHOUT CONTRAST    CLINICAL HISTORY:  Head trauma, minor (Age => 64y);    TECHNIQUE:  Low dose axial CT images obtained throughout the head without intravenous contrast. Sagittal and coronal reconstructions were performed.    COMPARISON:  Head CT most recent 12/07/2019    FINDINGS:  Intracranial compartment:    Age-appropriate mild generalized cerebral volume loss.  Ventricles are midline and  stable in size and configuration without distortion by mass effect or acute hydrocephalus.  No extra-axial blood or fluid collections.  Stable appearance of a partially calcified extra-axial mass abutting the dura along the left parietal lobe with persistent mild effect upon the adjacent underlying structures consistent with a calcified meningioma.    Grossly stable distribution of mild patchy hypoattenuation of the subcortical and periventricular white matter consistent with chronic small vessel ischemic change.  Unchanged suspected small remote lacunar type infarcts at the left centrum semiovale.  Skull base atherosclerotic vascular calcifications noted.  No parenchymal mass, hemorrhage, edema or major vascular distribution infarct.    Skull/extracranial contents (limited evaluation): Midline and right posterior parietal and occipital scalp soft tissue swelling/contusion.  No displaced skull fracture.  Partially imaged radiolucent defect at the posterior arch of C1 likely developmental.  Chronic partial opacification with mild hyperostosis of the left sphenoid sinus consistent with chronic sinusitis, stable to slightly more pacified from prior.  Mastoid air cells and remaining imaged paranasal sinuses are essentially clear.                                 Medical Decision Making:   Initial Assessment:   79 y/o female with a past medical history of hypertension, diabetes and CAD who presents to the Emergency Department for evaluation of a head laceration s/p a fall that occurred prior to arrival.   Differential Diagnosis:   Laceration, contusion, concussion, fracture  Independently Interpreted Test(s):   I have ordered and independently interpreted X-rays - see prior notes.  Clinical Tests:   Radiological Study: Ordered and Reviewed  ED Management:    On re-evaluation, the patient's status has improved.  After complete ED evaluation, clinical impression is most consistent with scalp laceration.  PCP follow-up  within 2-3 days was recommended. Staple removal in 1 week    After taking into careful account the patient's history, physical exam findings, as well as empirical and objective data obtained throughout ED workup, I feel no emergent medical condition has been identified. No further evaluation or admission was felt to be required, and the patient is stable for discharge from the ED. The patient and any additional family present were updated with test results, overall clinical impression, and recommended further plan of care, including discharge instructions as provided and outpatient follow-up for continued evaluation and management as needed. All questions were answered. The patient expressed understanding and agreed with current plan for discharge and follow-up plan of care. Strict ED return precautions were provided, including return/worsening of current symptoms, new symptoms, or any other concerns.                               Clinical Impression:       ICD-10-CM ICD-9-CM   1. Contusion of scalp, initial encounter  S00.03XA 920   2. Laceration of scalp, initial encounter  S01.01XA 873.0         Disposition:   Disposition: Discharged  Condition: Stable     ED Disposition Condition    Discharge Stable        ED Prescriptions     None        Follow-up Information     Follow up With Specialties Details Why Contact Info    Emelina Gaston MD Internal Medicine In 1 week For suture removal [address]  [place] [address]  [phone]      [facility] Emergency Medicine In 1 week For suture removal [address] [facility] [address] [phone]                      I, Angelica Osman,  personally performed the services described in this documentation. All medical record entries made by the scribe were at my direction and in my presence.  I have reviewed the chart and agree that the record reflects my personal performance and is accurate and complete. Angelica Osman M.D.  7:56 PM09/12/2020               Angelica Osman MD  09/12/20 1956

## 2020-09-13 NOTE — ED NOTES
Pt presents to ED today reports mechanical fall sustained while using step lift to get into vehicle.

## 2020-09-13 NOTE — ED NOTES
Non-stick tefla applied over site and secured w/ coban. Advised pt to keep site dry until tomorrow morning. Pt verbalized understanding. Pt denies dizziness when standing to get into wheelchair.

## 2020-09-14 ENCOUNTER — PATIENT OUTREACH (OUTPATIENT)
Dept: OTHER | Facility: OTHER | Age: 80
End: 2020-09-14

## 2020-09-14 ENCOUNTER — TELEPHONE (OUTPATIENT)
Dept: DERMATOLOGY | Facility: CLINIC | Age: 80
End: 2020-09-14

## 2020-09-14 DIAGNOSIS — I10 ESSENTIAL HYPERTENSION: ICD-10-CM

## 2020-09-14 RX ORDER — CHLORTHALIDONE 25 MG/1
12.5 TABLET ORAL DAILY
Qty: 90 TABLET | Refills: 1
Start: 2020-09-14 | End: 2021-02-14

## 2020-09-14 NOTE — PROGRESS NOTES
Digital Medicine: Clinician Follow-Up    Called patient to discuss elevated readings.     The history is provided by the patient.   Follow-up reason(s): routine follow up.     Hypertension    Readings are trending down   Patient is not experiencing signs/symptoms of hypotension.  Patient is not experiencing signs/symptoms of hypertension.      Additional Follow-up details: Patient reports she fell on Friday and had to get staples in her head. She denies any recent medication changes and states she has continued to take half a tablet of chlorthalidone (12.5 mg) daily.      Last 5 Patient Entered Readings                                      Current 30 Day Average: 156/72     Recent Readings 9/8/2020 9/4/2020 9/4/2020 9/3/2020 8/31/2020    SBP (mmHg) 145 167 171 164 141    DBP (mmHg) 59 79 82 73 61    Pulse 85 76 72 87 82             Screenings    ASSESSMENT(S)  Patients BP average is 156/72 mmHg, which is above goal. Patient's BP goal is less than or equal to 140/90 per 2017 ACC/AHA Hypertension Guidelines.       Hypertension Plan  Additional monitoring needed.  Continue current therapy.  Await resolution of current disease process.  Provided patient education.  Reminded to continue monitoring readings.  Will defer medication changes for now due to recent fall.     Addressed any questions or concerns and patient has my contact information if needed prior to next outreach. Patient verbalizes understanding.            There are no preventive care reminders to display for this patient.      Hypertension Medications             carvediloL (COREG) 25 MG tablet TAKE 2 TABLETS (50 MG TOTAL) BY MOUTH 2 (TWO) TIMES DAILY.    chlorthalidone (HYGROTEN) 25 MG Tab Take 1 tablet (25 mg total) by mouth once daily.    cloNIDine 0.1 mg/24 hr td ptwk (CATAPRES) 0.1 mg/24 hr PLACE 1 PATCH ONTO THE SKIN EVERY 7 DAYS.    diltiaZEM (CARDIZEM CD) 120 MG Cp24 TAKE 1 CAPSULE (120 MG TOTAL) BY MOUTH ONCE DAILY.    hydrALAZINE (APRESOLINE)  100 MG tablet Take 1 tablet (100 mg total) by mouth 3 (three) times daily.    irbesartan (AVAPRO) 300 MG tablet Take 1 tablet (300 mg total) by mouth every evening.    nitroGLYCERIN (NITROSTAT) 0.4 MG SL tablet Place 0.4 mg under the tongue every 5 (five) minutes as needed for Chest pain.

## 2020-09-15 ENCOUNTER — TELEPHONE (OUTPATIENT)
Dept: INTERNAL MEDICINE | Facility: CLINIC | Age: 80
End: 2020-09-15

## 2020-09-15 RX ORDER — PREDNISONE 20 MG/1
TABLET ORAL
Qty: 20 TABLET | Refills: 0 | Status: SHIPPED | OUTPATIENT
Start: 2020-09-15 | End: 2020-10-09 | Stop reason: SDUPTHER

## 2020-09-15 NOTE — TELEPHONE ENCOUNTER
----- Message from Denise Courtney sent at 9/15/2020  2:05 PM CDT -----  Regarding: Pt self Home [phone] or Mobile [phone]  Caller is requesting an earlier appt than we can schedule.  Caller declined first available appointment listed below. Caller will not accept being placed on the wait list and is requesting a message be sent to the provider.  When is the next available appointment:  09/28/2020  Did you offer to schedule the next available appt and put the patient on the wait list?:   Yes  What visit type: Ep  Symptoms:  N/A  Patient preference of timeframe to be scheduled:  As soon as possible   What is the reason the patient is requesting a sooner appointment? (insurance terminating, changing jobs):  Follow up from ER, Remove staples out of head  Would the patient rather a call back or a response via [facility]?:  A phone call please

## 2020-09-15 NOTE — TELEPHONE ENCOUNTER
Spoke to pt and she states the ER told het to wait 7-14 days she wants to know how long she have to wait and she is also having problems with her breathing due to asthma. Please advise

## 2020-09-15 NOTE — TELEPHONE ENCOUNTER
Asthma: asked patient to take peak flow and send prednisone to pharmacy, check sugars    Schedule near 14 days from the ER visit, not longer than 14 days and has to be after 8:30 am so we will have a nurse in.

## 2020-09-24 ENCOUNTER — HOSPITAL ENCOUNTER (OUTPATIENT)
Dept: RADIOLOGY | Facility: HOSPITAL | Age: 80
Discharge: HOME | End: 2020-09-24
Attending: INTERNAL MEDICINE
Payer: MEDICARE

## 2020-09-24 ENCOUNTER — OFFICE VISIT (OUTPATIENT)
Dept: INTERNAL MEDICINE | Facility: CLINIC | Age: 80
End: 2020-09-24
Payer: MEDICARE

## 2020-09-24 VITALS
BODY MASS INDEX: 28.32 KG/M2 | HEIGHT: 63 IN | DIASTOLIC BLOOD PRESSURE: 68 MMHG | OXYGEN SATURATION: 98 % | WEIGHT: 159.81 LBS | HEART RATE: 93 BPM | SYSTOLIC BLOOD PRESSURE: 130 MMHG

## 2020-09-24 DIAGNOSIS — Z48.02 REMOVAL OF STAPLE: ICD-10-CM

## 2020-09-24 DIAGNOSIS — E11.42 DIABETIC POLYNEUROPATHY ASSOCIATED WITH TYPE 2 DIABETES MELLITUS: ICD-10-CM

## 2020-09-24 DIAGNOSIS — R06.02 SOB (SHORTNESS OF BREATH): Primary | ICD-10-CM

## 2020-09-24 DIAGNOSIS — R06.02 SOB (SHORTNESS OF BREATH): ICD-10-CM

## 2020-09-24 PROCEDURE — 1126F AMNT PAIN NOTED NONE PRSNT: CPT | Mod: HCNC,S$GLB,, | Performed by: INTERNAL MEDICINE

## 2020-09-24 PROCEDURE — 71046 X-RAY EXAM CHEST 2 VIEWS: CPT | Mod: 26,HCNC,, | Performed by: RADIOLOGY

## 2020-09-24 PROCEDURE — 1159F MED LIST DOCD IN RCRD: CPT | Mod: HCNC,S$GLB,, | Performed by: INTERNAL MEDICINE

## 2020-09-24 PROCEDURE — 71046 X-RAY EXAM CHEST 2 VIEWS: CPT | Mod: TC,HCNC

## 2020-09-24 PROCEDURE — 3051F HG A1C>EQUAL 7.0%<8.0%: CPT | Mod: HCNC,CPTII,S$GLB, | Performed by: INTERNAL MEDICINE

## 2020-09-24 PROCEDURE — 1100F PTFALLS ASSESS-DOCD GE2>/YR: CPT | Mod: HCNC,CPTII,S$GLB, | Performed by: INTERNAL MEDICINE

## 2020-09-24 PROCEDURE — 1126F PR PAIN SEVERITY QUANTIFIED, NO PAIN PRESENT: ICD-10-PCS | Mod: HCNC,S$GLB,, | Performed by: INTERNAL MEDICINE

## 2020-09-24 PROCEDURE — 99999 PR PBB SHADOW E&M-EST. PATIENT-LVL V: CPT | Mod: PBBFAC,HCNC,, | Performed by: INTERNAL MEDICINE

## 2020-09-24 PROCEDURE — 3075F PR MOST RECENT SYSTOLIC BLOOD PRESS GE 130-139MM HG: ICD-10-PCS | Mod: HCNC,CPTII,S$GLB, | Performed by: INTERNAL MEDICINE

## 2020-09-24 PROCEDURE — 3078F DIAST BP <80 MM HG: CPT | Mod: HCNC,CPTII,S$GLB, | Performed by: INTERNAL MEDICINE

## 2020-09-24 PROCEDURE — 99999 PR PBB SHADOW E&M-EST. PATIENT-LVL V: ICD-10-PCS | Mod: PBBFAC,HCNC,, | Performed by: INTERNAL MEDICINE

## 2020-09-24 PROCEDURE — 99214 OFFICE O/P EST MOD 30 MIN: CPT | Mod: HCNC,S$GLB,, | Performed by: INTERNAL MEDICINE

## 2020-09-24 PROCEDURE — 3051F PR MOST RECENT HEMOGLOBIN A1C LEVEL 7.0 - < 8.0%: ICD-10-PCS | Mod: HCNC,CPTII,S$GLB, | Performed by: INTERNAL MEDICINE

## 2020-09-24 PROCEDURE — 1100F PR PT FALLS ASSESS DOC 2+ FALLS/FALL W/INJURY/YR: ICD-10-PCS | Mod: HCNC,CPTII,S$GLB, | Performed by: INTERNAL MEDICINE

## 2020-09-24 PROCEDURE — 99214 PR OFFICE/OUTPT VISIT, EST, LEVL IV, 30-39 MIN: ICD-10-PCS | Mod: HCNC,S$GLB,, | Performed by: INTERNAL MEDICINE

## 2020-09-24 PROCEDURE — 3288F FALL RISK ASSESSMENT DOCD: CPT | Mod: HCNC,CPTII,S$GLB, | Performed by: INTERNAL MEDICINE

## 2020-09-24 PROCEDURE — 3078F PR MOST RECENT DIASTOLIC BLOOD PRESSURE < 80 MM HG: ICD-10-PCS | Mod: HCNC,CPTII,S$GLB, | Performed by: INTERNAL MEDICINE

## 2020-09-24 PROCEDURE — 71046 XR CHEST PA AND LATERAL: ICD-10-PCS | Mod: 26,HCNC,, | Performed by: RADIOLOGY

## 2020-09-24 PROCEDURE — 3288F PR FALLS RISK ASSESSMENT DOCUMENTED: ICD-10-PCS | Mod: HCNC,CPTII,S$GLB, | Performed by: INTERNAL MEDICINE

## 2020-09-24 PROCEDURE — 3075F SYST BP GE 130 - 139MM HG: CPT | Mod: HCNC,CPTII,S$GLB, | Performed by: INTERNAL MEDICINE

## 2020-09-24 PROCEDURE — 1159F PR MEDICATION LIST DOCUMENTED IN MEDICAL RECORD: ICD-10-PCS | Mod: HCNC,S$GLB,, | Performed by: INTERNAL MEDICINE

## 2020-09-24 RX ORDER — INSULIN GLARGINE 100 [IU]/ML
INJECTION, SOLUTION SUBCUTANEOUS
Qty: 45 SYRINGE | Refills: 2 | Status: CANCELLED | OUTPATIENT
Start: 2020-09-24

## 2020-09-24 RX ORDER — INSULIN GLARGINE 100 [IU]/ML
34 INJECTION, SOLUTION SUBCUTANEOUS NIGHTLY
Qty: 1 BOX | Refills: 12 | Status: SHIPPED | OUTPATIENT
Start: 2020-09-24 | End: 2021-03-15

## 2020-09-25 ENCOUNTER — TELEPHONE (OUTPATIENT)
Dept: INTERNAL MEDICINE | Facility: CLINIC | Age: 80
End: 2020-09-25
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
## 2020-09-25 NOTE — TELEPHONE ENCOUNTER
----- Message from Emelina Gaston MD sent at 9/24/2020  5:03 PM CDT -----  No disease noted in the chest

## 2020-09-27 ENCOUNTER — TELEPHONE (OUTPATIENT)
Dept: INTERNAL MEDICINE | Facility: CLINIC | Age: 80
End: 2020-09-27

## 2020-09-27 RX ORDER — DOXYCYCLINE HYCLATE 100 MG
100 TABLET ORAL EVERY 12 HOURS
Qty: 20 TABLET | Refills: 0 | Status: SHIPPED | OUTPATIENT
Start: 2020-09-27 | End: 2020-10-09 | Stop reason: SDUPTHER

## 2020-09-27 NOTE — TELEPHONE ENCOUNTER
Please tell the patient:  I have sent doxycycline to the pharmacy to try an antibiotic    No fluid on the lung

## 2020-09-29 ENCOUNTER — PATIENT MESSAGE (OUTPATIENT)
Dept: OTHER | Facility: OTHER | Age: 80
End: 2020-09-29

## 2020-09-30 ENCOUNTER — OFFICE VISIT (OUTPATIENT)
Dept: PULMONOLOGY | Facility: CLINIC | Age: 80
End: 2020-09-30
Payer: MEDICARE

## 2020-09-30 ENCOUNTER — LAB VISIT (OUTPATIENT)
Dept: SURGERY | Facility: CLINIC | Age: 80
End: 2020-09-30
Payer: MEDICARE

## 2020-09-30 ENCOUNTER — TELEPHONE (OUTPATIENT)
Dept: ELECTROPHYSIOLOGY | Facility: CLINIC | Age: 80
End: 2020-09-30

## 2020-09-30 ENCOUNTER — OFFICE VISIT (OUTPATIENT)
Dept: CARDIOLOGY | Facility: CLINIC | Age: 80
End: 2020-09-30
Payer: MEDICARE

## 2020-09-30 VITALS
DIASTOLIC BLOOD PRESSURE: 72 MMHG | SYSTOLIC BLOOD PRESSURE: 138 MMHG | OXYGEN SATURATION: 98 % | HEIGHT: 63 IN | HEART RATE: 74 BPM | BODY MASS INDEX: 28.67 KG/M2 | WEIGHT: 161.81 LBS

## 2020-09-30 VITALS
SYSTOLIC BLOOD PRESSURE: 205 MMHG | DIASTOLIC BLOOD PRESSURE: 90 MMHG | HEIGHT: 63 IN | HEART RATE: 102 BPM | BODY MASS INDEX: 28.95 KG/M2 | OXYGEN SATURATION: 97 % | WEIGHT: 163.38 LBS

## 2020-09-30 DIAGNOSIS — I50.22 CHRONIC SYSTOLIC HEART FAILURE: ICD-10-CM

## 2020-09-30 DIAGNOSIS — N18.30 TYPE 2 DM WITH CKD STAGE 3 AND HYPERTENSION: ICD-10-CM

## 2020-09-30 DIAGNOSIS — I44.7 LBBB (LEFT BUNDLE BRANCH BLOCK): ICD-10-CM

## 2020-09-30 DIAGNOSIS — I12.9 TYPE 2 DM WITH CKD STAGE 3 AND HYPERTENSION: ICD-10-CM

## 2020-09-30 DIAGNOSIS — E11.69 HYPERLIPIDEMIA ASSOCIATED WITH TYPE 2 DIABETES MELLITUS: ICD-10-CM

## 2020-09-30 DIAGNOSIS — N18.4 CKD (CHRONIC KIDNEY DISEASE) STAGE 4, GFR 15-29 ML/MIN: ICD-10-CM

## 2020-09-30 DIAGNOSIS — I70.0 CALCIFICATION OF AORTA: ICD-10-CM

## 2020-09-30 DIAGNOSIS — J44.9 CHRONIC OBSTRUCTIVE PULMONARY DISEASE, UNSPECIFIED COPD TYPE: ICD-10-CM

## 2020-09-30 DIAGNOSIS — E78.5 HYPERLIPIDEMIA ASSOCIATED WITH TYPE 2 DIABETES MELLITUS: ICD-10-CM

## 2020-09-30 DIAGNOSIS — Z95.810 BIVENTRICULAR ICD (IMPLANTABLE CARDIOVERTER-DEFIBRILLATOR) IN PLACE: ICD-10-CM

## 2020-09-30 DIAGNOSIS — E11.65 UNCONTROLLED TYPE 2 DIABETES MELLITUS WITH HYPERGLYCEMIA: ICD-10-CM

## 2020-09-30 DIAGNOSIS — Z85.3 HISTORY OF BREAST CANCER: ICD-10-CM

## 2020-09-30 DIAGNOSIS — I42.8 NONISCHEMIC CARDIOMYOPATHY: Primary | ICD-10-CM

## 2020-09-30 DIAGNOSIS — I27.20 PULMONARY HYPERTENSION: ICD-10-CM

## 2020-09-30 DIAGNOSIS — R06.02 SHORTNESS OF BREATH: Primary | ICD-10-CM

## 2020-09-30 DIAGNOSIS — J45.21 INTERMITTENT ASTHMA WITH ACUTE EXACERBATION, UNSPECIFIED ASTHMA SEVERITY: ICD-10-CM

## 2020-09-30 DIAGNOSIS — J45.901 CHRONIC ASTHMA WITH ACUTE EXACERBATION, UNSPECIFIED ASTHMA SEVERITY, UNSPECIFIED WHETHER PERSISTENT: ICD-10-CM

## 2020-09-30 DIAGNOSIS — I15.2 HYPERTENSION ASSOCIATED WITH DIABETES: ICD-10-CM

## 2020-09-30 DIAGNOSIS — R06.02 SHORTNESS OF BREATH: ICD-10-CM

## 2020-09-30 DIAGNOSIS — E11.59 HYPERTENSION ASSOCIATED WITH DIABETES: ICD-10-CM

## 2020-09-30 DIAGNOSIS — I25.10 CORONARY ARTERY DISEASE INVOLVING NATIVE CORONARY ARTERY OF NATIVE HEART WITHOUT ANGINA PECTORIS: ICD-10-CM

## 2020-09-30 DIAGNOSIS — E11.22 TYPE 2 DM WITH CKD STAGE 3 AND HYPERTENSION: ICD-10-CM

## 2020-09-30 DIAGNOSIS — E66.3 OVERWEIGHT (BMI 25.0-29.9): ICD-10-CM

## 2020-09-30 PROBLEM — Z45.02 ICD (IMPLANTABLE CARDIOVERTER-DEFIBRILLATOR) BATTERY DEPLETION: Status: RESOLVED | Noted: 2018-12-17 | Resolved: 2020-09-30

## 2020-09-30 PROCEDURE — U0003 INFECTIOUS AGENT DETECTION BY NUCLEIC ACID (DNA OR RNA); SEVERE ACUTE RESPIRATORY SYNDROME CORONAVIRUS 2 (SARS-COV-2) (CORONAVIRUS DISEASE [COVID-19]), AMPLIFIED PROBE TECHNIQUE, MAKING USE OF HIGH THROUGHPUT TECHNOLOGIES AS DESCRIBED BY CMS-2020-01-R: HCPCS | Mod: HCNC

## 2020-09-30 PROCEDURE — 3051F HG A1C>EQUAL 7.0%<8.0%: CPT | Mod: HCNC,CPTII,S$GLB, | Performed by: NURSE PRACTITIONER

## 2020-09-30 PROCEDURE — 1126F PR PAIN SEVERITY QUANTIFIED, NO PAIN PRESENT: ICD-10-PCS | Mod: HCNC,S$GLB,, | Performed by: NURSE PRACTITIONER

## 2020-09-30 PROCEDURE — 3075F PR MOST RECENT SYSTOLIC BLOOD PRESS GE 130-139MM HG: ICD-10-PCS | Mod: HCNC,CPTII,S$GLB, | Performed by: NURSE PRACTITIONER

## 2020-09-30 PROCEDURE — 1101F PR PT FALLS ASSESS DOC 0-1 FALLS W/OUT INJ PAST YR: ICD-10-PCS | Mod: HCNC,CPTII,S$GLB, | Performed by: NURSE PRACTITIONER

## 2020-09-30 PROCEDURE — 3078F DIAST BP <80 MM HG: CPT | Mod: HCNC,CPTII,S$GLB, | Performed by: NURSE PRACTITIONER

## 2020-09-30 PROCEDURE — 3074F PR MOST RECENT SYSTOLIC BLOOD PRESSURE < 130 MM HG: ICD-10-PCS | Mod: HCNC,CPTII,S$GLB, | Performed by: NURSE PRACTITIONER

## 2020-09-30 PROCEDURE — 99999 PR PBB SHADOW E&M-EST. PATIENT-LVL III: CPT | Mod: PBBFAC,HCNC,, | Performed by: NURSE PRACTITIONER

## 2020-09-30 PROCEDURE — 1101F PT FALLS ASSESS-DOCD LE1/YR: CPT | Mod: HCNC,CPTII,S$GLB, | Performed by: NURSE PRACTITIONER

## 2020-09-30 PROCEDURE — 99213 PR OFFICE/OUTPT VISIT, EST, LEVL III, 20-29 MIN: ICD-10-PCS | Mod: HCNC,S$GLB,, | Performed by: NURSE PRACTITIONER

## 2020-09-30 PROCEDURE — 99999 PR PBB SHADOW E&M-EST. PATIENT-LVL III: ICD-10-PCS | Mod: PBBFAC,HCNC,, | Performed by: NURSE PRACTITIONER

## 2020-09-30 PROCEDURE — 3074F SYST BP LT 130 MM HG: CPT | Mod: HCNC,CPTII,S$GLB, | Performed by: NURSE PRACTITIONER

## 2020-09-30 PROCEDURE — 1159F MED LIST DOCD IN RCRD: CPT | Mod: HCNC,S$GLB,, | Performed by: NURSE PRACTITIONER

## 2020-09-30 PROCEDURE — 1159F PR MEDICATION LIST DOCUMENTED IN MEDICAL RECORD: ICD-10-PCS | Mod: HCNC,S$GLB,, | Performed by: NURSE PRACTITIONER

## 2020-09-30 PROCEDURE — 3051F PR MOST RECENT HEMOGLOBIN A1C LEVEL 7.0 - < 8.0%: ICD-10-PCS | Mod: HCNC,CPTII,S$GLB, | Performed by: NURSE PRACTITIONER

## 2020-09-30 PROCEDURE — 99999 PR PBB SHADOW E&M-EST. PATIENT-LVL V: ICD-10-PCS | Mod: PBBFAC,HCNC,, | Performed by: NURSE PRACTITIONER

## 2020-09-30 PROCEDURE — 1126F AMNT PAIN NOTED NONE PRSNT: CPT | Mod: HCNC,S$GLB,, | Performed by: NURSE PRACTITIONER

## 2020-09-30 PROCEDURE — 99214 PR OFFICE/OUTPT VISIT, EST, LEVL IV, 30-39 MIN: ICD-10-PCS | Mod: HCNC,S$GLB,, | Performed by: NURSE PRACTITIONER

## 2020-09-30 PROCEDURE — 3078F PR MOST RECENT DIASTOLIC BLOOD PRESSURE < 80 MM HG: ICD-10-PCS | Mod: HCNC,CPTII,S$GLB, | Performed by: NURSE PRACTITIONER

## 2020-09-30 PROCEDURE — 3075F SYST BP GE 130 - 139MM HG: CPT | Mod: HCNC,CPTII,S$GLB, | Performed by: NURSE PRACTITIONER

## 2020-09-30 PROCEDURE — 99214 OFFICE O/P EST MOD 30 MIN: CPT | Mod: HCNC,S$GLB,, | Performed by: NURSE PRACTITIONER

## 2020-09-30 PROCEDURE — 99999 PR PBB SHADOW E&M-EST. PATIENT-LVL V: CPT | Mod: PBBFAC,HCNC,, | Performed by: NURSE PRACTITIONER

## 2020-09-30 PROCEDURE — 99213 OFFICE O/P EST LOW 20 MIN: CPT | Mod: HCNC,S$GLB,, | Performed by: NURSE PRACTITIONER

## 2020-09-30 RX ORDER — MONTELUKAST SODIUM 10 MG/1
10 TABLET ORAL NIGHTLY
Qty: 30 TABLET | Refills: 11 | Status: SHIPPED | OUTPATIENT
Start: 2020-09-30 | End: 2020-10-30

## 2020-09-30 RX ORDER — FLUTICASONE PROPIONATE AND SALMETEROL 500; 50 UG/1; UG/1
1 POWDER RESPIRATORY (INHALATION) 2 TIMES DAILY
Qty: 60 EACH | Refills: 11 | Status: SHIPPED | OUTPATIENT
Start: 2020-09-30 | End: 2021-03-23

## 2020-09-30 RX ORDER — DILTIAZEM HYDROCHLORIDE 180 MG/1
180 CAPSULE, COATED, EXTENDED RELEASE ORAL DAILY
Qty: 90 CAPSULE | Refills: 3 | Status: ON HOLD | OUTPATIENT
Start: 2020-09-30 | End: 2021-03-25 | Stop reason: HOSPADM

## 2020-09-30 NOTE — TELEPHONE ENCOUNTER
Called both of pt's numbers, but she did not answer. Scheduled appts on a day that she will be here for other appts and will mail appt reminder.  ----- Message from Theodora Swanson NP sent at 9/30/2020 10:57 AM CDT -----  Regarding: follow up  Please get her a follow up with Dr. Mckeon and an in house interrogation.  She was due in March. Thanks!

## 2020-09-30 NOTE — PROGRESS NOTES
Subjective:       Patient ID: Myesha Quinones is a 80 y.o. female.    Chief Complaint: Shortness of Breath    HPI:   Myesha Quinones is a 80 y.o. female who presents with evaluation for shortness of breath.  Having shortenss of breath  nd cough.  Had an appt with Dr. Fernando and his NP and they recommended that she be evaluated in our clinic.  Sometimes her nebs help the symptoms, has been ongoing for 2 weeks  Had a fall and then things started around then,  Had a head lac  Has had pleural effusions that required a thora twice.  Had PNA in 12/2019.    Using Wixela BID, using nebs more often, has started an abx and it has helped.   Hasn't used flonase  Finished the prednisone yesterday  Has 7 more days of abx  Has been couighing up green at times      Review of Systems   Constitutional: Negative for fever, chills, activity change, fatigue and night sweats.   HENT: Negative for postnasal drip, rhinorrhea, trouble swallowing and congestion.    Eyes: Negative for itching.   Respiratory: Positive for cough, sputum production, shortness of breath and wheezing. Negative for hemoptysis, choking, chest tightness, dyspnea on extertion and use of rescue inhaler.    Cardiovascular: Negative for chest pain and palpitations.   Genitourinary: Negative for difficulty urinating.   Endocrine: Negative for cold intolerance and heat intolerance.    Musculoskeletal: Negative for arthralgias.   Skin: Negative for rash.   Gastrointestinal: Negative for nausea, vomiting and acid reflux.   Neurological: Negative for dizziness and light-headedness.   Hematological: Negative for adenopathy.   Psychiatric/Behavioral: Positive for sleep disturbance (minimal).         Social History     Tobacco Use    Smoking status: Never Smoker    Smokeless tobacco: Never Used   Substance Use Topics    Alcohol use: No     Alcohol/week: 0.0 standard drinks       Review of patient's allergies indicates:   Allergen Reactions    Iodine and iodide  containing products Hives    Nifedipine      weakness     Past Medical History:   Diagnosis Date    Acute hypoxemic respiratory failure 12/19/2019    Acute right-sided thoracic back pain 12/9/2019    Allergy     Asthma     Basal cell carcinoma     left forehead    Basal cell carcinoma     left nose    Basal cell carcinoma 05/20/2015    right nose    Basal cell carcinoma 12/22/2015    left lower post neck    Basal cell carcinoma 12/03/2019    left ant scalp     Breast cancer     CAD (coronary artery disease)     Cardiomyopathy     Cardiomyopathy, ischemic     Cataract     CHF (congestive heart failure)     Chronic kidney disease, stage 3, mod decreased GFR 2/14/2017    Colon polyp 2011    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, with long-term current use of insulin 2/22/2018    COPD (chronic obstructive pulmonary disease)     COPD exacerbation 4/8/2018    Defibrillator discharge     Diabetes mellitus     Diabetes mellitus type II     Diabetes with neurologic complications     Goiter     MNG    HX: breast cancer     Hyperlipidemia     Hypertension     Iron deficiency anemia 5/16/2017    Left kidney mass     Meningioma     Osteoporosis, postmenopausal     Pacemaker     Pneumonia 12/8/2019    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    Pseudomonas pneumonia     Skin cancer     s/p excision    Sleep apnea     CPAP    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/2015    mid forehead    Unspecified vitamin D deficiency     Ventricular tachycardia     Vitamin B12 deficiency     Vitamin D deficiency disease      Past Surgical History:   Procedure Laterality Date    BASAL CELL CARCINOMA EXCISION      posterior neck and nose    BREAST BIOPSY      BREAST CYST EXCISION Left     BREAST SURGERY      CARDIAC DEFIBRILLATOR PLACEMENT      x 2    CATARACT EXTRACTION W/  INTRAOCULAR LENS IMPLANT Bilateral      CHOLECYSTECTOMY      COLONOSCOPY N/A 11/5/2019    Procedure: COLONOSCOPY;  Surgeon: Boaz Botello MD;  Location: [facility] ENDO (4TH FLR);  Service: Endoscopy;  Laterality: N/A;  AICD - Medtronic - sm    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    FRACTURE SURGERY      left elbow and wrist as a child    HYSTERECTOMY      MASTECTOMY Right     REPLACEMENT OF IMPLANTABLE CARDIOVERTER-DEFIBRILLATOR (ICD) GENERATOR N/A 12/17/2018    Procedure: REPLACEMENT, ICD GENERATOR;  Surgeon: Jan Mckeon MD;  Location: [facility] EP LAB;  Service: Cardiology;  Laterality: N/A;  DONNA, CRT-D gen change, MDT, MAC, SK, 3 Prep    REVISION OF SKIN POCKET FOR CARDIOVERTER-DEFIBRILLATOR  12/17/2018    Procedure: Revision, Skin Pocket, For Cardioverter-Defibrillator;  Surgeon: Jan Mckeon MD;  Location: [facility] EP LAB;  Service: Cardiology;;    SQUAMOUS CELL CARCINOMA EXCISION      remved from forehead    TONSILLECTOMY       Current Outpatient Medications on File Prior to Visit   Medication Sig    acetaminophen (TYLENOL) 500 MG tablet Take 1,000 mg by mouth daily as needed for Pain.    albuterol (VENTOLIN HFA) 90 mcg/actuation inhaler INHALE 2 PUFFS INTO THE LUNGS EVERY 6 (SIX) HOURS AS NEEDED FOR WHEEZING. RESCUE    albuterol-ipratropium (DUO-NEB) 2.5 mg-0.5 mg/3 mL nebulizer solution TAKE 1 VIAL BY NEBULIZATION EVERY 4 (FOUR) HOURS. RESCUE    aspirin (ENTERIC COATED ASPIRIN) 81 MG EC tablet Take 1 tablet by mouth once daily.     benzonatate (TESSALON PERLES) 100 MG capsule Take 2 capsules (200 mg total) by mouth 3 (three) times daily as needed for Cough.    blood sugar diagnostic (ACCU-CHEK ANGELICA) Strp Uses Accu-Check Angelica meter to test BG 4x/day    carvediloL (COREG) 25 MG tablet TAKE 2 TABLETS (50 MG TOTAL) BY MOUTH 2 (TWO) TIMES DAILY.    chlorthalidone (HYGROTEN) 25 MG Tab Take 0.5 tablets (12.5 mg total) by mouth once daily.    cholecalciferol, vitamin D3, (VITAMIN D3) 2,000 unit Tab Take 1 tablet by mouth once daily.   "   cloNIDine 0.1 mg/24 hr td ptwk (CATAPRES) 0.1 mg/24 hr PLACE 1 PATCH ONTO THE SKIN EVERY 7 DAYS.    CYANOCOBALAMIN, VITAMIN B-12, (B-12 DOTS ORAL) Take 1 tablet by mouth once daily.    diltiaZEM (CARDIZEM CD) 180 MG 24 hr capsule Take 1 capsule (180 mg total) by mouth once daily.    doxycycline (VIBRA-TABS) 100 MG tablet Take 1 tablet (100 mg total) by mouth every 12 (twelve) hours. Take with food and water    fluticasone propionate (FLONASE) 50 mcg/actuation nasal spray 2 sprays (100 mcg total) by Each Nostril route once daily.    fluticasone-salmeterol diskus inhaler 250-50 mcg Inhale 1 puff into the lungs 2 (two) times daily. This is a change from one month    hydrALAZINE (APRESOLINE) 100 MG tablet Take 1 tablet (100 mg total) by mouth 3 (three) times daily.    insulin (LANTUS SOLOSTAR U-100 INSULIN) glargine 100 units/mL (3mL) SubQ pen Inject 34 Units into the skin every evening. Cancel the 30 unit prescription    irbesartan (AVAPRO) 300 MG tablet Take 1 tablet (300 mg total) by mouth every evening.    lancets (ACCU-CHEK SOFTCLIX LANCETS) Misc Uses Accu-Chek Angelica meter to test BG 4x/day (Patient taking differently: Uses Accu-Chek Angelica meter to test BG 1x/day)    levocetirizine (XYZAL) 5 MG tablet Take 1 tablet (5 mg total) by mouth every evening.    linaGLIPtin (TRADJENTA) 5 mg Tab tablet Take 1 tablet (5 mg total) by mouth once daily.    magnesium oxide (MAG-OX) 400 mg tablet Take 1 tablet (400 mg total) by mouth once daily.    metFORMIN (GLUCOPHAGE) 500 MG tablet Take 1 tablet (500 mg total) by mouth 2 (two) times daily with meals.    nitroGLYCERIN (NITROSTAT) 0.4 MG SL tablet Place 0.4 mg under the tongue every 5 (five) minutes as needed for Chest pain.     omega-3 fatty acids (FISH OIL) 500 mg Cap Take 1 capsule by mouth Twice daily.    pen needle, diabetic (BD ULTRA-FINE MINI PEN NEEDLE) 31 gauge x 3/16" Ndle USE WITH LANTUS AT BEDTIME    pravastatin (PRAVACHOL) 40 MG tablet TAKE 1 " TABLET BY MOUTH EVERY DAY    predniSONE (DELTASONE) 20 MG tablet 2 tablets a day for 5 days then one tablet a day for 10 days (Patient not taking: Reported on 9/30/2020)    [DISCONTINUED] diltiaZEM (CARDIZEM CD) 120 MG Cp24 TAKE 1 CAPSULE (120 MG TOTAL) BY MOUTH ONCE DAILY.     Current Facility-Administered Medications on File Prior to Visit   Medication    0.9%  NaCl infusion       Objective:      Vitals:    09/30/20 1358   BP: 138/72   Pulse: 74     Physical Exam   Constitutional: She is oriented to person, place, and time. She appears well-developed and well-nourished. No distress.   HENT:   Head: Normocephalic.   Neck: Normal range of motion. Neck supple.   Cardiovascular: Normal rate and regular rhythm.   No murmur heard.  Pulmonary/Chest: Normal expansion, symmetric chest wall expansion, effort normal and breath sounds normal. No respiratory distress. She has no decreased breath sounds. She has no wheezes. She has no rhonchi. She has no rales.   Abdominal: Soft. She exhibits no distension. There is no hepatosplenomegaly. There is no abdominal tenderness.   Musculoskeletal: Normal range of motion.         General: No edema.   Lymphadenopathy:     She has no cervical adenopathy.   Neurological: She is alert and oriented to person, place, and time. Gait normal.   Skin: Skin is warm and dry. No cyanosis. Nails show no clubbing.   Psychiatric: She has a normal mood and affect.   Vitals reviewed.    Personal Diagnostic Review    PFTs personally reviewed and discussed with patient  Imaging personally reviewed with patient CXR 9/24/20          Assessment:     Problem List Items Addressed This Visit        Pulmonary    Asthma, chronic    Overview     Increase Advair to 500/50 BID. Ventolin for rescue and Duo Nebs PRN  Add in Singulair  Likely needs LAMA, will follow up after about 6 weeks via telephone   PFTs show obstruction and mild restriction.  Some DLCO impairment too         Current Assessment & Plan      Monitor.            Other Visit Diagnoses     Shortness of breath    -  Primary    Relevant Orders    COVID-19 Routine Screening (Completed)    Stress test, pulmonary (Completed)    Intermittent asthma with acute exacerbation, unspecified asthma severity        Relevant Orders    Spirometry with/without bronchodilator (Completed)    LUNG VOLUMES    DLCO-Carbon Monoxide Diffusing Capacity

## 2020-09-30 NOTE — PATIENT INSTRUCTIONS
I am increasing your Advair to 500 mcg/50 mcg, twice daily    Finish taking your doxycycline    Use your nebulizer treatments every 4-6 hours as needed for cough, wheezing or shortness of breath    We will get a COVID swab and breathing tests    Call us if you have any issues, questions or concerns.

## 2020-09-30 NOTE — PROGRESS NOTES
Ms. Quinones is a patient of Dr. Fernando and was last seen in [facility] 6/29/2020.      Subjective:   Patient ID:  Myesha Quinones is a 80 y.o. female who presents for follow-up of Shortness of Breath, Palpitations, and Heart Problem      Problems:  CAD  DM Type II  Non-ischemic cardiomyopathy  Pulmonary hypertension  COPD  LBBB s/p CRT-D  HTN  HLD  Hx Breast CA  CKD stage IV      HPI  Ms. Quinones is in clinic today for followup after Dr. Fernando increased coreg to 50 mg BID in June.  Reports PELAYO and SOB for the last 2 weeks.  Her weight has been stable.  She started doxycyline 2 days ago.  She finished the prednisone yesterday.  She took the albuterol this morning on her way in and it helped some.  Patient denies chest pain with exertion or at rest, palpitations, dizziness, syncope, edema, orthopnea, PND, or claudication.  Reports no routine exercise.      Review of Systems   Constitution: Negative for decreased appetite, diaphoresis, malaise/fatigue, weight gain and weight loss.   Eyes: Negative for visual disturbance.   Cardiovascular: Positive for dyspnea on exertion. Negative for chest pain, claudication, irregular heartbeat, leg swelling, near-syncope, orthopnea, palpitations, paroxysmal nocturnal dyspnea and syncope.        Denies chest pressure   Respiratory: Positive for cough and shortness of breath. Negative for hemoptysis, sleep disturbances due to breathing and snoring.    Endocrine: Negative for cold intolerance and heat intolerance.   Hematologic/Lymphatic: Negative for bleeding problem. Does not bruise/bleed easily.   Musculoskeletal: Negative for myalgias.   Gastrointestinal: Negative for bloating, abdominal pain, anorexia, change in bowel habit, constipation, diarrhea, nausea and vomiting.   Neurological: Negative for difficulty with concentration, disturbances in coordination, excessive daytime sleepiness, dizziness, headaches, light-headedness, loss of balance, numbness and weakness.    Psychiatric/Behavioral: The patient does not have insomnia.        Allergies and current medications updated and reviewed:  Review of patient's allergies indicates:   Allergen Reactions    Iodine and iodide containing products Hives    Nifedipine      weakness     Current Outpatient Medications   Medication Sig    acetaminophen (TYLENOL) 500 MG tablet Take 1,000 mg by mouth daily as needed for Pain.    albuterol (VENTOLIN HFA) 90 mcg/actuation inhaler INHALE 2 PUFFS INTO THE LUNGS EVERY 6 (SIX) HOURS AS NEEDED FOR WHEEZING. RESCUE    albuterol-ipratropium (DUO-NEB) 2.5 mg-0.5 mg/3 mL nebulizer solution TAKE 1 VIAL BY NEBULIZATION EVERY 4 (FOUR) HOURS. RESCUE    aspirin (ENTERIC COATED ASPIRIN) 81 MG EC tablet Take 1 tablet by mouth once daily.     benzonatate (TESSALON PERLES) 100 MG capsule Take 2 capsules (200 mg total) by mouth 3 (three) times daily as needed for Cough.    blood sugar diagnostic (ACCU-CHEK HECTOR) Strp Uses Accu-Check Hector meter to test BG 4x/day    carvediloL (COREG) 25 MG tablet TAKE 2 TABLETS (50 MG TOTAL) BY MOUTH 2 (TWO) TIMES DAILY.    chlorthalidone (HYGROTEN) 25 MG Tab Take 0.5 tablets (12.5 mg total) by mouth once daily.    cholecalciferol, vitamin D3, (VITAMIN D3) 2,000 unit Tab Take 1 tablet by mouth once daily.     cloNIDine 0.1 mg/24 hr td ptwk (CATAPRES) 0.1 mg/24 hr PLACE 1 PATCH ONTO THE SKIN EVERY 7 DAYS.    CYANOCOBALAMIN, VITAMIN B-12, (B-12 DOTS ORAL) Take 1 tablet by mouth once daily.    diltiaZEM (CARDIZEM CD) 120 MG Cp24 TAKE 1 CAPSULE (120 MG TOTAL) BY MOUTH ONCE DAILY.    doxycycline (VIBRA-TABS) 100 MG tablet Take 1 tablet (100 mg total) by mouth every 12 (twelve) hours. Take with food and water    fluticasone propionate (FLONASE) 50 mcg/actuation nasal spray 2 sprays (100 mcg total) by Each Nostril route once daily.    fluticasone-salmeterol diskus inhaler 250-50 mcg Inhale 1 puff into the lungs 2 (two) times daily. This is a change from one month     "hydrALAZINE (APRESOLINE) 100 MG tablet Take 1 tablet (100 mg total) by mouth 3 (three) times daily.    insulin (LANTUS SOLOSTAR U-100 INSULIN) glargine 100 units/mL (3mL) SubQ pen Inject 34 Units into the skin every evening. Cancel the 30 unit prescription    irbesartan (AVAPRO) 300 MG tablet Take 1 tablet (300 mg total) by mouth every evening.    lancets (ACCU-CHEK SOFTCLIX LANCETS) Misc Uses Accu-Chek Angelica meter to test BG 4x/day (Patient taking differently: Uses Accu-Chek Angelica meter to test BG 1x/day)    levocetirizine (XYZAL) 5 MG tablet Take 1 tablet (5 mg total) by mouth every evening.    linaGLIPtin (TRADJENTA) 5 mg Tab tablet Take 1 tablet (5 mg total) by mouth once daily.    magnesium oxide (MAG-OX) 400 mg tablet Take 1 tablet (400 mg total) by mouth once daily.    metFORMIN (GLUCOPHAGE) 500 MG tablet Take 1 tablet (500 mg total) by mouth 2 (two) times daily with meals.    nitroGLYCERIN (NITROSTAT) 0.4 MG SL tablet Place 0.4 mg under the tongue every 5 (five) minutes as needed for Chest pain.     omega-3 fatty acids (FISH OIL) 500 mg Cap Take 1 capsule by mouth Twice daily.    pen needle, diabetic (BD ULTRA-FINE MINI PEN NEEDLE) 31 gauge x 3/16" Ndle USE WITH LANTUS AT BEDTIME    pravastatin (PRAVACHOL) 40 MG tablet TAKE 1 TABLET BY MOUTH EVERY DAY    predniSONE (DELTASONE) 20 MG tablet 2 tablets a day for 5 days then one tablet a day for 10 days (Patient not taking: Reported on 2020)     No current facility-administered medications for this visit.      Facility-Administered Medications Ordered in Other Visits   Medication    0.9%  NaCl infusion       Objective:     Right Arm BP - Sittin/87 (20)  Left Arm BP - Sittin/90 (20)    BP (!) 205/90   Pulse 102   Ht 5' 3" (1.6 m)   Wt 74.1 kg (163 lb 5.8 oz)   LMP  (LMP Unknown)   SpO2 97%   BMI 28.94 kg/m²       Physical Exam   Constitutional: She is oriented to person, place, and time. Vital signs are " normal. She appears well-developed and well-nourished. She is active. No distress.   HENT:   Head: Normocephalic and atraumatic.   Eyes: Conjunctivae and lids are normal. No scleral icterus.   Neck: Neck supple. Normal carotid pulses, no hepatojugular reflux and no JVD present. Carotid bruit is not present.   Cardiovascular: Normal rate, regular rhythm, S1 normal, S2 normal and intact distal pulses. PMI is not displaced. Exam reveals no gallop and no friction rub.   No murmur heard.  Pulses:       Carotid pulses are 2+ on the right side and 2+ on the left side.       Radial pulses are 2+ on the right side and 2+ on the left side.        Dorsalis pedis pulses are 2+ on the right side and 2+ on the left side.        Posterior tibial pulses are 1+ on the right side and 1+ on the left side.   Pulmonary/Chest: Effort normal. No respiratory distress. She has decreased breath sounds. She has wheezes. She has no rhonchi. She has no rales. She exhibits no tenderness.   Abdominal: Soft. Normal appearance and bowel sounds are normal. She exhibits no distension, no fluid wave, no abdominal bruit, no ascites and no pulsatile midline mass. There is no hepatosplenomegaly. There is no abdominal tenderness.   Musculoskeletal:         General: No edema.   Neurological: She is alert and oriented to person, place, and time. Gait normal.   Skin: Skin is warm, dry and intact. No rash noted. She is not diaphoretic. Nails show no clubbing.   Psychiatric: She has a normal mood and affect. Her speech is normal and behavior is normal. Judgment and thought content normal. Cognition and memory are normal.   Nursing note and vitals reviewed.      Chemistry        Component Value Date/Time     (L) 09/24/2020 1049    K 4.7 09/24/2020 1049     09/24/2020 1049    CO2 22 (L) 09/24/2020 1049    BUN 42 (H) 09/24/2020 1049    CREATININE 1.6 (H) 09/24/2020 1049     (H) 09/24/2020 1049        Component Value Date/Time    CALCIUM 9.1  09/24/2020 1049    ALKPHOS 78 09/24/2020 1049    AST 13 09/24/2020 1049    ALT 15 09/24/2020 1049    BILITOT 0.3 09/24/2020 1049    ESTGFRAFRICA 34.6 (A) 09/24/2020 1049    EGFRNONAA 30.0 (A) 09/24/2020 1049        Lab Results   Component Value Date    HGBA1C 7.6 (H) 07/17/2020     Recent Labs   Lab 04/08/18  0739  12/17/19  1536  01/17/20  0817 07/17/20  0859 09/24/20  1049   WBC 9.76   < >  --    < > 8.63  8.63 7.65 13.92 H   Hemoglobin 11.2 L   < >  --    < > 10.1 L  10.1 L 9.9 L 9.2 L   Hematocrit 35.0 L   < >  --    < > 33.2 L  33.2 L 31.9 L 30.6 L   Mean Corpuscular Volume 91   < >  --    < > 94  94 98 99 H   Platelets 210   < >  --    < > 295  295 210 330    H  --  156 H  --   --   --  126 H   TSH  --    < >  --   --  0.447  --   --    Cholesterol  --    < >  --   --  131 124  --    HDL  --    < >  --   --  42 40  --    LDL Cholesterol  --    < >  --   --  58.6 L 59.0 L  --    Triglycerides  --    < >  --   --  152 H 125  --    Hdl/Cholesterol Ratio  --    < >  --   --  32.1 32.3  --     < > = values in this interval not displayed.       Recent Labs   Lab 04/08/18  0739 05/11/18  1025 12/10/18  0838   INR 1.0 1.0 1.0        Test(s) Reviewed  I have reviewed the following in detail:  [] Stress test   [] Angiography   [x] Echocardiogram   [x] Labs   [] Other:         Assessment/Plan:   1. Nonischemic cardiomyopathy  NYHA class III. Euvolemic on exam.  Do not believe SOB to be from decompensated HF.  She is wheezing on exam.  Refer to pulmonary.     2. LBBB (left bundle branch block)      3. Biventricular ICD (implantable cardioverter-defibrillator) in place  Overdue for f/u with EP.  Email sent to Dr. Mckeon's staff and device clinic to coordinate in house interrogation and f/u with Dr. Mckeon.     4. Coronary artery disease involving native coronary artery of native heart without angina pectoris  Asymptomatic. Taking low intensity statin and ASA. No changes.      5. Calcification of aorta      6.  Hyperlipidemia associated with type 2 diabetes mellitus  LDL at goal <70. No changes      7. Chronic obstructive pulmonary disease, unspecified COPD type  Suspect acute exacerbation of COPD.  Pulmonary appointment made for today to facilitate appropriate treatment.     8. Pulmonary hypertension      9. Hypertension associated with diabetes  BP not at goal <130/80.  Typically BP is better controlled, 120-130s.  She took beta agonist right before visit and is tachypneic.  Will have her keep a bp log.       10. CKD (chronic kidney disease) stage 4, GFR 15-29 ml/min      11. History of breast cancer      12. Uncontrolled type 2 diabetes mellitus with hyperglycemia  A1C not at goal <7. F/U with PCP as planned      13. Obesity  BMI 30.7  Encouraged increased CV exercise to 30 minutes a day for 5 days a week.          The patient was discussed and examined by Dr. Fernando    Follow up in about 3 months (around 12/30/2020).

## 2020-10-01 ENCOUNTER — OFFICE VISIT (OUTPATIENT)
Dept: INTERNAL MEDICINE | Facility: CLINIC | Age: 80
End: 2020-10-01
Payer: MEDICARE

## 2020-10-01 VITALS
HEIGHT: 63 IN | OXYGEN SATURATION: 97 % | HEART RATE: 76 BPM | DIASTOLIC BLOOD PRESSURE: 44 MMHG | WEIGHT: 161.81 LBS | SYSTOLIC BLOOD PRESSURE: 120 MMHG | BODY MASS INDEX: 28.67 KG/M2

## 2020-10-01 DIAGNOSIS — E04.2 NONTOXIC MULTINODULAR GOITER: Primary | ICD-10-CM

## 2020-10-01 DIAGNOSIS — J45.40 MODERATE PERSISTENT ASTHMA, UNSPECIFIED WHETHER COMPLICATED: ICD-10-CM

## 2020-10-01 DIAGNOSIS — R06.02 SOB (SHORTNESS OF BREATH): Primary | ICD-10-CM

## 2020-10-01 PROBLEM — J45.909 MODERATE ASTHMA: Status: ACTIVE | Noted: 2020-10-01

## 2020-10-01 LAB — SARS-COV-2 RNA RESP QL NAA+PROBE: NOT DETECTED

## 2020-10-01 PROCEDURE — 99999 PR PBB SHADOW E&M-EST. PATIENT-LVL IV: CPT | Mod: PBBFAC,HCNC,, | Performed by: INTERNAL MEDICINE

## 2020-10-01 PROCEDURE — 3078F DIAST BP <80 MM HG: CPT | Mod: HCNC,CPTII,S$GLB, | Performed by: INTERNAL MEDICINE

## 2020-10-01 PROCEDURE — 3078F PR MOST RECENT DIASTOLIC BLOOD PRESSURE < 80 MM HG: ICD-10-PCS | Mod: HCNC,CPTII,S$GLB, | Performed by: INTERNAL MEDICINE

## 2020-10-01 PROCEDURE — 1101F PT FALLS ASSESS-DOCD LE1/YR: CPT | Mod: HCNC,CPTII,S$GLB, | Performed by: INTERNAL MEDICINE

## 2020-10-01 PROCEDURE — 1159F PR MEDICATION LIST DOCUMENTED IN MEDICAL RECORD: ICD-10-PCS | Mod: HCNC,S$GLB,, | Performed by: INTERNAL MEDICINE

## 2020-10-01 PROCEDURE — 3074F SYST BP LT 130 MM HG: CPT | Mod: HCNC,CPTII,S$GLB, | Performed by: INTERNAL MEDICINE

## 2020-10-01 PROCEDURE — 99213 OFFICE O/P EST LOW 20 MIN: CPT | Mod: HCNC,S$GLB,, | Performed by: INTERNAL MEDICINE

## 2020-10-01 PROCEDURE — 3074F PR MOST RECENT SYSTOLIC BLOOD PRESSURE < 130 MM HG: ICD-10-PCS | Mod: HCNC,CPTII,S$GLB, | Performed by: INTERNAL MEDICINE

## 2020-10-01 PROCEDURE — 1126F AMNT PAIN NOTED NONE PRSNT: CPT | Mod: HCNC,S$GLB,, | Performed by: INTERNAL MEDICINE

## 2020-10-01 PROCEDURE — 1159F MED LIST DOCD IN RCRD: CPT | Mod: HCNC,S$GLB,, | Performed by: INTERNAL MEDICINE

## 2020-10-01 PROCEDURE — 99213 PR OFFICE/OUTPT VISIT, EST, LEVL III, 20-29 MIN: ICD-10-PCS | Mod: HCNC,S$GLB,, | Performed by: INTERNAL MEDICINE

## 2020-10-01 PROCEDURE — 1126F PR PAIN SEVERITY QUANTIFIED, NO PAIN PRESENT: ICD-10-PCS | Mod: HCNC,S$GLB,, | Performed by: INTERNAL MEDICINE

## 2020-10-01 PROCEDURE — 1101F PR PT FALLS ASSESS DOC 0-1 FALLS W/OUT INJ PAST YR: ICD-10-PCS | Mod: HCNC,CPTII,S$GLB, | Performed by: INTERNAL MEDICINE

## 2020-10-01 PROCEDURE — 99999 PR PBB SHADOW E&M-EST. PATIENT-LVL IV: ICD-10-PCS | Mod: PBBFAC,HCNC,, | Performed by: INTERNAL MEDICINE

## 2020-10-02 ENCOUNTER — HOSPITAL ENCOUNTER (OUTPATIENT)
Dept: PULMONOLOGY | Facility: CLINIC | Age: 80
Discharge: HOME | End: 2020-10-02
Payer: MEDICARE

## 2020-10-02 ENCOUNTER — PATIENT MESSAGE (OUTPATIENT)
Dept: PULMONOLOGY | Facility: CLINIC | Age: 80
End: 2020-10-02

## 2020-10-02 VITALS — BODY MASS INDEX: 30.71 KG/M2 | HEIGHT: 61 IN | WEIGHT: 162.69 LBS

## 2020-10-02 DIAGNOSIS — J45.21 INTERMITTENT ASTHMA WITH ACUTE EXACERBATION, UNSPECIFIED ASTHMA SEVERITY: ICD-10-CM

## 2020-10-02 DIAGNOSIS — R06.02 SHORTNESS OF BREATH: ICD-10-CM

## 2020-10-02 PROCEDURE — 94060 PR EVAL OF BRONCHOSPASM: ICD-10-PCS | Mod: HCNC,S$GLB,, | Performed by: INTERNAL MEDICINE

## 2020-10-02 PROCEDURE — 94060 EVALUATION OF WHEEZING: CPT | Mod: HCNC,S$GLB,, | Performed by: INTERNAL MEDICINE

## 2020-10-02 PROCEDURE — 94618 PULMONARY STRESS TESTING: CPT | Mod: HCNC,S$GLB,, | Performed by: INTERNAL MEDICINE

## 2020-10-02 PROCEDURE — 94729 DIFFUSING CAPACITY: CPT | Mod: HCNC,S$GLB,, | Performed by: INTERNAL MEDICINE

## 2020-10-02 PROCEDURE — 94729 PR C02/MEMBANE DIFFUSE CAPACITY: ICD-10-PCS | Mod: HCNC,S$GLB,, | Performed by: INTERNAL MEDICINE

## 2020-10-02 PROCEDURE — 94727 GAS DIL/WSHOT DETER LNG VOL: CPT | Mod: HCNC,S$GLB,, | Performed by: INTERNAL MEDICINE

## 2020-10-02 PROCEDURE — 94618 PULMONARY STRESS TESTING: ICD-10-PCS | Mod: HCNC,S$GLB,, | Performed by: INTERNAL MEDICINE

## 2020-10-02 PROCEDURE — 94727 PR PULM FUNCTION TEST BY GAS: ICD-10-PCS | Mod: HCNC,S$GLB,, | Performed by: INTERNAL MEDICINE

## 2020-10-02 NOTE — PROGRESS NOTES
Subjective:      Patient ID: Myesha Quinones is a 80 y.o. female.    Chief Complaint: Follow-up (1 week follow up )    HPI:  HPI   Patient is here for follow up os exacerbation of asthma. Her CXR showed no pleural effusion, BNP was normal and labs showed an elevated WBC expected with steroids. 4 days ago I sent doxy to the pharmacy. Of note her Advair was changed to Wixela.   Patient was seen by Cardiology and Pulmonary and has further studies ordered  Patient Active Problem List   Diagnosis    History of nonmelanoma skin cancer    Nonischemic cardiomyopathy    LBBB (left bundle branch block)    Nontoxic multinodular goiter    Meningioma    Asthma, chronic    Biventricular ICD (implantable cardioverter-defibrillator) in place    Tremor    Coronary artery disease involving native coronary artery without angina pectoris - Non-obstructive 50% LAD    Hypertension associated with diabetes    History of breast cancer    Adrenal cortical nodule: repeat CT 6/2016    Calcification of aorta    Diabetic polyneuropathy associated with type 2 diabetes mellitus    Uncontrolled type 2 diabetes mellitus with hyperglycemia    Osteoporosis    KHOA (obstructive sleep apnea)    Iron deficiency anemia    Chemotherapy-induced neuropathy    Hypomagnesemia    Hypertensive retinopathy, bilateral    Multiple lung nodules    COPD (chronic obstructive pulmonary disease)    Mixed conductive and sensorineural hearing loss    Cardiomyopathy, ischemic    Metabolic bone disease    Proteinuria    Seasonal allergies    Rib fracture    CKD (chronic kidney disease) stage 4, GFR 15-29 ml/min    Hyperlipidemia associated with type 2 diabetes mellitus    Mild nonproliferative retinopathy due to secondary diabetes    Macular edema due to secondary diabetes    Overweight (BMI 25.0-29.9)    Moderate asthma     Past Medical History:   Diagnosis Date    Acute hypoxemic respiratory failure 12/19/2019    Acute right-sided  thoracic back pain 12/9/2019    Allergy     Asthma     Basal cell carcinoma     left forehead    Basal cell carcinoma     left nose    Basal cell carcinoma 05/20/2015    right nose    Basal cell carcinoma 12/22/2015    left lower post neck    Basal cell carcinoma 12/03/2019    left ant scalp     Breast cancer     CAD (coronary artery disease)     Cardiomyopathy     Cardiomyopathy, ischemic     Cataract     CHF (congestive heart failure)     Chronic kidney disease, stage 3, mod decreased GFR 2/14/2017    Colon polyp 2011    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, with long-term current use of insulin 2/22/2018    COPD (chronic obstructive pulmonary disease)     COPD exacerbation 4/8/2018    Defibrillator discharge     Diabetes mellitus     Diabetes mellitus type II     Diabetes with neurologic complications     Goiter     MNG    HX: breast cancer     Hyperlipidemia     Hypertension     Iron deficiency anemia 5/16/2017    Left kidney mass     Meningioma     Osteoporosis, postmenopausal     Pacemaker     Pneumonia 12/8/2019    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    Pseudomonas pneumonia     Skin cancer     s/p excision    Sleep apnea     CPAP    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/2015    mid forehead    Unspecified vitamin D deficiency     Ventricular tachycardia     Vitamin B12 deficiency     Vitamin D deficiency disease      Past Surgical History:   Procedure Laterality Date    BASAL CELL CARCINOMA EXCISION      posterior neck and nose    BREAST BIOPSY      BREAST CYST EXCISION Left     BREAST SURGERY      CARDIAC DEFIBRILLATOR PLACEMENT      x 2    CATARACT EXTRACTION W/  INTRAOCULAR LENS IMPLANT Bilateral     CHOLECYSTECTOMY      COLONOSCOPY N/A 11/5/2019    Procedure: COLONOSCOPY;  Surgeon: Boaz Botello MD;  Location: [facility] ([address] [place] [address]);  Service: Endoscopy;  Laterality: N/A;  AICD -  [employer] -     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    FRACTURE SURGERY      left elbow and wrist as a child    HYSTERECTOMY      MASTECTOMY Right     REPLACEMENT OF IMPLANTABLE CARDIOVERTER-DEFIBRILLATOR (ICD) GENERATOR N/A 12/17/2018    Procedure: REPLACEMENT, ICD GENERATOR;  Surgeon: Jan Mckeon MD;  Location: [facility] EP LAB;  Service: Cardiology;  Laterality: N/A;  DONNA, CRT-D gen sulema, MDT, MAC, SK, 3 Prep    REVISION OF SKIN POCKET FOR CARDIOVERTER-DEFIBRILLATOR  12/17/2018    Procedure: Revision, Skin Pocket, For Cardioverter-Defibrillator;  Surgeon: Jan Mckeon MD;  Location: [facility] EP LAB;  Service: Cardiology;;    SQUAMOUS CELL CARCINOMA EXCISION      remved from forehead    TONSILLECTOMY       Family History   Problem Relation Age of Onset    Diabetes Father     Heart disease Father     Diabetes Sister     Heart disease Sister     Diabetes Brother     Heart disease Brother     Hypertension Brother     Diabetes Brother     Heart disease Brother     Hypertension Brother     Diabetes Brother     Heart disease Brother     Cancer Brother         colon    Diabetes Brother     Cancer Son         skin    Diabetes Son         prediabetes    Diabetes Daughter         prediabetes    Cancer Daughter         melanoma    Obesity Daughter     Melanoma Daughter     Diabetes Son     Asthma Mother     Hypertension Mother     Stroke Mother     No Known Problems Maternal Grandmother     No Known Problems Maternal Grandfather     No Known Problems Paternal Grandmother     No Known Problems Paternal Grandfather     Amblyopia Neg Hx     Blindness Neg Hx     Cataracts Neg Hx     Glaucoma Neg Hx     Macular degeneration Neg Hx     Retinal detachment Neg Hx     Strabismus Neg Hx     Thyroid disease Neg Hx      Review of Systems   Constitutional: Negative for activity change, chills, fever and unexpected weight change.   Respiratory: Positive for shortness of breath. Negative  "for cough, chest tightness and wheezing.    Cardiovascular: Negative for chest pain, palpitations and leg swelling.     Objective:     Vitals:    10/01/20 0934 10/01/20 0939   BP: (!) 124/50 (!) 120/44   Pulse: 76    SpO2: 97%    Weight: 73.4 kg (161 lb 13.1 oz)    Height: 5' 3" (1.6 m)    PainSc: 0-No pain      Body mass index is 28.66 kg/m².  Physical Exam  Constitutional:       Appearance: Normal appearance. She is well-developed.      Comments: Patient was not in distress   Neck:      Thyroid: No thyromegaly.      Vascular: No JVD.   Cardiovascular:      Rate and Rhythm: Normal rate and regular rhythm.      Heart sounds: Normal heart sounds.   Pulmonary:      Effort: Pulmonary effort is normal. No respiratory distress.      Breath sounds: Wheezing present.   Neurological:      Mental Status: She is alert.       Assessment:     1. SOB (shortness of breath)    2. Moderate persistent asthma, unspecified whether complicated      Plan:   Myesha was seen today for follow-up.    Diagnoses and all orders for this visit:    SOB (shortness of breath)    Moderate persistent asthma, unspecified whether complicated    Continue same meds, patient feels a little better after doxy but is following up with Pulmonary.    Problem List Items Addressed This Visit     Moderate asthma      Other Visit Diagnoses     SOB (shortness of breath)    -  Primary        No orders of the defined types were placed in this encounter.    No follow-ups on file.     Medication List          Accurate as of October 1, 2020  8:30 PM. If you have any questions, ask your nurse or doctor.            CHANGE how you take these medications    lancets Misc  Commonly known as: ACCU-CHEK SOFTCLIX LANCETS  Uses Accu-Chek Angelica meter to test BG 4x/day  What changed: additional instructions        CONTINUE taking these medications    acetaminophen 500 MG tablet  Commonly known as: TYLENOL     albuterol 90 mcg/actuation inhaler  Commonly known as: VENTOLIN " HFA  INHALE 2 PUFFS INTO THE LUNGS EVERY 6 (SIX) HOURS AS NEEDED FOR WHEEZING. RESCUE     albuterol-ipratropium 2.5 mg-0.5 mg/3 mL nebulizer solution  Commonly known as: DUO-NEB  TAKE 1 VIAL BY NEBULIZATION EVERY 4 (FOUR) HOURS. RESCUE     B-12 DOTS ORAL     benzonatate 100 MG capsule  Commonly known as: TESSALON PERLES  Take 2 capsules (200 mg total) by mouth 3 (three) times daily as needed for Cough.     blood sugar diagnostic Strp  Commonly known as: ACCU-CHEK HETCOR  Uses Accu-Check Hector meter to test BG 4x/day     carvediloL 25 MG tablet  Commonly known as: COREG  TAKE 2 TABLETS (50 MG TOTAL) BY MOUTH 2 (TWO) TIMES DAILY.     chlorthalidone 25 MG Tab  Commonly known as: HYGROTEN  Take 0.5 tablets (12.5 mg total) by mouth once daily.     cloNIDine 0.1 mg/24 hr td ptwk 0.1 mg/24 hr  Commonly known as: CATAPRES  PLACE 1 PATCH ONTO THE SKIN EVERY 7 DAYS.     diltiaZEM 180 MG 24 hr capsule  Commonly known as: CARDIZEM CD  Take 1 capsule (180 mg total) by mouth once daily.     doxycycline 100 MG tablet  Commonly known as: VIBRA-TABS  Take 1 tablet (100 mg total) by mouth every 12 (twelve) hours. Take with food and water     ENTERIC COATED ASPIRIN 81 MG EC tablet  Generic drug: aspirin     FISH  mg Cap  Generic drug: omega-3 fatty acids     fluticasone propionate 50 mcg/actuation nasal spray  Commonly known as: FLONASE  2 sprays (100 mcg total) by Each Nostril route once daily.     fluticasone-salmeterol 500-50 mcg/dose 500-50 mcg/dose Dsdv diskus inhaler  Commonly known as: ADVAIR DISKUS  Inhale 1 puff into the lungs 2 (two) times daily. Controller     hydrALAZINE 100 MG tablet  Commonly known as: APRESOLINE  Take 1 tablet (100 mg total) by mouth 3 (three) times daily.     irbesartan 300 MG tablet  Commonly known as: AVAPRO  Take 1 tablet (300 mg total) by mouth every evening.     LANTUS SOLOSTAR U-100 INSULIN glargine 100 units/mL (3mL) SubQ pen  Generic drug: insulin  Inject 34 Units into the skin every  "evening. Cancel the 30 unit prescription     levocetirizine 5 MG tablet  Commonly known as: XYZAL  Take 1 tablet (5 mg total) by mouth every evening.     magnesium oxide 400 mg (241.3 mg magnesium) tablet  Commonly known as: MAG-OX  Take 1 tablet (400 mg total) by mouth once daily.     metFORMIN 500 MG tablet  Commonly known as: GLUCOPHAGE  Take 1 tablet (500 mg total) by mouth 2 (two) times daily with meals.     montelukast 10 mg tablet  Commonly known as: SINGULAIR  Take 1 tablet (10 mg total) by mouth every evening.     nitroGLYCERIN 0.4 MG SL tablet  Commonly known as: NITROSTAT     pen needle, diabetic 31 gauge x 3/16" Ndle  Commonly known as: BD ULTRA-FINE MINI PEN NEEDLE  USE WITH LANTUS AT BEDTIME     pravastatin 40 MG tablet  Commonly known as: PRAVACHOL  TAKE 1 TABLET BY MOUTH EVERY DAY     predniSONE 20 MG tablet  Commonly known as: DELTASONE  2 tablets a day for 5 days then one tablet a day for 10 days     TRADJENTA 5 mg Tab tablet  Generic drug: linaGLIPtin  Take 1 tablet (5 mg total) by mouth once daily.     VITAMIN D3 50 mcg (2,000 unit) Tab  Generic drug: cholecalciferol (vitamin D3)              "

## 2020-10-03 NOTE — PROCEDURES
Myesha Quinones is a 80 y.o.  female patient, who presents for a 6 minute walk test ordered by MARILYN Zhu.  The diagnosis is Shortness of Breath.  The patient's BMI is 30.8 kg/m2.  Predicted distance (lower limit of normal) is 213.58 meters.      Test Results:    The test was not completed due to unsteady gait.  The patient stopped 1 time for a total of 216 seconds.  The total time walked was 144 seconds.  During walking, the patient reported:  Dyspnea, Other (Comment)(unsteady gait).  The patient used no assistive devices during testing.     10/02/2020---------Distance: 91.44 meters (300 feet)     O2 Sat % Supplemental Oxygen Heart Rate Blood Pressure Dalton Scale   Pre-exercise  (Resting) 97 % Room Air 94 bpm 195/80 mmHg 4   During Exercise 93 % Room Air 101 bpm 177/78 mmHg 7-8   Post-exercise  (Recovery) 97 % Room Air  100 bpm       Recovery Time: 75 seconds    Performing nurse/tech: Estop[employer] RRT      PREVIOUS STUDY:   The patient has not had a previous study.      CLINICAL INTERPRETATION:  Six minute walk distance is 91.44 meters (300 feet) with very heavy dyspnea.  During exercise, there was desaturation while breathing room air.  Both blood pressure and heart rate remained stable with walking.  Hypertension was present prior to exercise.  The patient reported non-pulmonary symptoms during exercise.  Severe exercise impairment is likely due to cardiovascular causes and subjective symptoms.  The patient did not complete the study, walking 144 seconds of the 360 second test.  No previous study performed.  Based upon age and body mass index, exercise capacity is less than predicted.

## 2020-10-05 NOTE — PROGRESS NOTES
Chart review complete. Current BP average is trending down. Diltiazem was increased to 180 mg daily by Theodora Swanson NP (cardiology) on 9/30/2020. Readings since dose increase are trending down and have been at goal. No medication recommendations at this time. Will defer outreach to allow for additional monitoring.     Last 5 Patient Entered Readings                                      Current 30 Day Average: 143/63     Recent Readings 10/3/2020 10/1/2020 9/26/2020 9/22/2020 9/21/2020    SBP (mmHg) 124 127 155 141 147    DBP (mmHg) 52 58 66 65 65    Pulse 81 70 86 77 69         Hypertension Medications             carvediloL (COREG) 25 MG tablet TAKE 2 TABLETS (50 MG TOTAL) BY MOUTH 2 (TWO) TIMES DAILY.    chlorthalidone (HYGROTEN) 25 MG Tab Take 0.5 tablets (12.5 mg total) by mouth once daily.    cloNIDine 0.1 mg/24 hr td ptwk (CATAPRES) 0.1 mg/24 hr PLACE 1 PATCH ONTO THE SKIN EVERY 7 DAYS.    diltiaZEM (CARDIZEM CD) 180 MG 24 hr capsule Take 1 capsule (180 mg total) by mouth once daily.    hydrALAZINE (APRESOLINE) 100 MG tablet Take 1 tablet (100 mg total) by mouth 3 (three) times daily.    irbesartan (AVAPRO) 300 MG tablet Take 1 tablet (300 mg total) by mouth every evening.    nitroGLYCERIN (NITROSTAT) 0.4 MG SL tablet Place 0.4 mg under the tongue every 5 (five) minutes as needed for Chest pain.

## 2020-10-08 ENCOUNTER — OFFICE VISIT (OUTPATIENT)
Dept: OPHTHALMOLOGY | Facility: CLINIC | Age: 80
End: 2020-10-08
Payer: MEDICARE

## 2020-10-08 DIAGNOSIS — E11.3213 CONTROLLED TYPE 2 DIABETES MELLITUS WITH BOTH EYES AFFECTED BY MILD NONPROLIFERATIVE RETINOPATHY AND MACULAR EDEMA, WITH LONG-TERM CURRENT USE OF INSULIN: Primary | ICD-10-CM

## 2020-10-08 DIAGNOSIS — H35.033 HYPERTENSIVE RETINOPATHY, BILATERAL: ICD-10-CM

## 2020-10-08 DIAGNOSIS — Z79.4 CONTROLLED TYPE 2 DIABETES MELLITUS WITH BOTH EYES AFFECTED BY MILD NONPROLIFERATIVE RETINOPATHY AND MACULAR EDEMA, WITH LONG-TERM CURRENT USE OF INSULIN: Primary | ICD-10-CM

## 2020-10-08 PROCEDURE — 92202 PR OPHTHALMOSCOPY, EXT, W/DRAW OPTIC NERVE/MACULA, I&R, UNI/BI: ICD-10-PCS | Mod: HCNC,S$GLB,, | Performed by: OPHTHALMOLOGY

## 2020-10-08 PROCEDURE — 99999 PR PBB SHADOW E&M-EST. PATIENT-LVL III: CPT | Mod: PBBFAC,HCNC,, | Performed by: OPHTHALMOLOGY

## 2020-10-08 PROCEDURE — 92014 PR EYE EXAM, EST PATIENT,COMPREHESV: ICD-10-PCS | Mod: HCNC,S$GLB,, | Performed by: OPHTHALMOLOGY

## 2020-10-08 PROCEDURE — 92014 COMPRE OPH EXAM EST PT 1/>: CPT | Mod: HCNC,S$GLB,, | Performed by: OPHTHALMOLOGY

## 2020-10-08 PROCEDURE — 92134 CPTRZ OPH DX IMG PST SGM RTA: CPT | Mod: HCNC,S$GLB,, | Performed by: OPHTHALMOLOGY

## 2020-10-08 PROCEDURE — 92134 POSTERIOR SEGMENT OCT RETINA (OCULAR COHERENCE TOMOGRAPHY)-BOTH EYES: ICD-10-PCS | Mod: HCNC,S$GLB,, | Performed by: OPHTHALMOLOGY

## 2020-10-08 PROCEDURE — 92202 OPSCPY EXTND ON/MAC DRAW: CPT | Mod: HCNC,S$GLB,, | Performed by: OPHTHALMOLOGY

## 2020-10-08 PROCEDURE — 99999 PR PBB SHADOW E&M-EST. PATIENT-LVL III: ICD-10-PCS | Mod: PBBFAC,HCNC,, | Performed by: OPHTHALMOLOGY

## 2020-10-08 RX ORDER — FLUORESCEIN 500 MG/ML
5 INJECTION INTRAVENOUS ONCE
Status: DISCONTINUED | OUTPATIENT
Start: 2020-10-08 | End: 2021-03-23 | Stop reason: CLARIF

## 2020-10-08 NOTE — PROGRESS NOTES
HPI     4 mo OCT  DLS: 06/04/20 Dr. Arechiga     Patient states vision seems stable no change.Sometimes vision fluctuates,   Denies pain.  (-)Flashes (-)Floaters    No gtts         OCT - OD No ME  OS - central cystoid edema - slightly improved    Prior FA - Foveal MA's OS   No NV of NP OU      A/P    1. 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 DME OS  S/p Avastin OS x 5  S/p Ozurdex OS x 5 9/19  S/p Iluvien 9/19    Doing well, will need chronic steroid    Stable today - observe    F/U FA next visit       3. HTN Ret OU  BS/BP/chol control    4. PCIOL OU  With small PCO      4 months OCT/FA OS

## 2020-10-09 ENCOUNTER — TELEPHONE (OUTPATIENT)
Dept: INTERNAL MEDICINE | Facility: CLINIC | Age: 80
End: 2020-10-09

## 2020-10-09 RX ORDER — DOXYCYCLINE HYCLATE 100 MG
100 TABLET ORAL EVERY 12 HOURS
Qty: 20 TABLET | Refills: 0 | Status: SHIPPED | OUTPATIENT
Start: 2020-10-09 | End: 2020-11-17 | Stop reason: SDUPTHER

## 2020-10-09 RX ORDER — PREDNISONE 20 MG/1
TABLET ORAL
Qty: 14 TABLET | Refills: 0 | Status: SHIPPED | OUTPATIENT
Start: 2020-10-09 | End: 2020-11-17 | Stop reason: SDUPTHER

## 2020-10-09 NOTE — TELEPHONE ENCOUNTER
----- Message from Herman Lu sent at 10/9/2020  9:16 AM CDT -----  Contact: Self  Type:  Needs Medical Advice    Who Called: Self    Would the patient rather a call back or a response via [facility]? Call back     Best Call Back Number: [phone]    Additional Information: Self [phone]------calling to spk with the nurse regarding the pt medication. The pt states that she was told by the provider to call when she was almost finish her antibiotics. The pt is requesting a call back

## 2020-10-09 NOTE — TELEPHONE ENCOUNTER
----- Message from Adam Clark sent at 10/9/2020  1:00 PM CDT -----  Regarding: Rx  Contact: Pharmacy [facility] [phone]  Pharmacy is calling to clarify an RX.  RX name:   doxycycline (VIBRA-TABS) 100 MG tablet   What do they need to clarify:  the insurance will not cover tablets but will cover Capsule, want to know if can change?  Comments:     Please call an advise  Thank you

## 2020-10-09 NOTE — TELEPHONE ENCOUNTER
Spoke to pt and she said you told her to contact you when she was almost finish taking antibiotics she said she still have cough and sob when walking. Please advise

## 2020-10-15 ENCOUNTER — PATIENT OUTREACH (OUTPATIENT)
Dept: OTHER | Facility: OTHER | Age: 80
End: 2020-10-15

## 2020-10-20 ENCOUNTER — PATIENT OUTREACH (OUTPATIENT)
Dept: ADMINISTRATIVE | Facility: OTHER | Age: 80
End: 2020-10-20

## 2020-10-21 ENCOUNTER — OFFICE VISIT (OUTPATIENT)
Dept: NEPHROLOGY | Facility: CLINIC | Age: 80
End: 2020-10-21
Payer: MEDICARE

## 2020-10-21 VITALS
OXYGEN SATURATION: 97 % | HEIGHT: 61 IN | WEIGHT: 163.13 LBS | SYSTOLIC BLOOD PRESSURE: 142 MMHG | BODY MASS INDEX: 30.8 KG/M2 | HEART RATE: 96 BPM | DIASTOLIC BLOOD PRESSURE: 60 MMHG

## 2020-10-21 DIAGNOSIS — I12.9 CKD STAGE 4 SECONDARY TO HYPERTENSION: ICD-10-CM

## 2020-10-21 DIAGNOSIS — N18.4 CKD STAGE 4 SECONDARY TO HYPERTENSION: ICD-10-CM

## 2020-10-21 PROCEDURE — 99999 PR PBB SHADOW E&M-EST. PATIENT-LVL V: CPT | Mod: PBBFAC,HCNC,, | Performed by: INTERNAL MEDICINE

## 2020-10-21 PROCEDURE — 3078F PR MOST RECENT DIASTOLIC BLOOD PRESSURE < 80 MM HG: ICD-10-PCS | Mod: HCNC,CPTII,S$GLB, | Performed by: INTERNAL MEDICINE

## 2020-10-21 PROCEDURE — 3077F PR MOST RECENT SYSTOLIC BLOOD PRESSURE >= 140 MM HG: ICD-10-PCS | Mod: HCNC,CPTII,S$GLB, | Performed by: INTERNAL MEDICINE

## 2020-10-21 PROCEDURE — 1159F PR MEDICATION LIST DOCUMENTED IN MEDICAL RECORD: ICD-10-PCS | Mod: HCNC,S$GLB,, | Performed by: INTERNAL MEDICINE

## 2020-10-21 PROCEDURE — 99499 UNLISTED E&M SERVICE: CPT | Mod: S$GLB,,, | Performed by: INTERNAL MEDICINE

## 2020-10-21 PROCEDURE — 1126F PR PAIN SEVERITY QUANTIFIED, NO PAIN PRESENT: ICD-10-PCS | Mod: HCNC,S$GLB,, | Performed by: INTERNAL MEDICINE

## 2020-10-21 PROCEDURE — 1100F PR PT FALLS ASSESS DOC 2+ FALLS/FALL W/INJURY/YR: ICD-10-PCS | Mod: HCNC,CPTII,S$GLB, | Performed by: INTERNAL MEDICINE

## 2020-10-21 PROCEDURE — 99213 PR OFFICE/OUTPT VISIT, EST, LEVL III, 20-29 MIN: ICD-10-PCS | Mod: HCNC,S$GLB,, | Performed by: INTERNAL MEDICINE

## 2020-10-21 PROCEDURE — 3078F DIAST BP <80 MM HG: CPT | Mod: HCNC,CPTII,S$GLB, | Performed by: INTERNAL MEDICINE

## 2020-10-21 PROCEDURE — 99213 OFFICE O/P EST LOW 20 MIN: CPT | Mod: HCNC,S$GLB,, | Performed by: INTERNAL MEDICINE

## 2020-10-21 PROCEDURE — 99499 RISK ADDL DX/OHS AUDIT: ICD-10-PCS | Mod: S$GLB,,, | Performed by: INTERNAL MEDICINE

## 2020-10-21 PROCEDURE — 3288F PR FALLS RISK ASSESSMENT DOCUMENTED: ICD-10-PCS | Mod: HCNC,CPTII,S$GLB, | Performed by: INTERNAL MEDICINE

## 2020-10-21 PROCEDURE — 1100F PTFALLS ASSESS-DOCD GE2>/YR: CPT | Mod: HCNC,CPTII,S$GLB, | Performed by: INTERNAL MEDICINE

## 2020-10-21 PROCEDURE — 1159F MED LIST DOCD IN RCRD: CPT | Mod: HCNC,S$GLB,, | Performed by: INTERNAL MEDICINE

## 2020-10-21 PROCEDURE — 99999 PR PBB SHADOW E&M-EST. PATIENT-LVL V: ICD-10-PCS | Mod: PBBFAC,HCNC,, | Performed by: INTERNAL MEDICINE

## 2020-10-21 PROCEDURE — 3077F SYST BP >= 140 MM HG: CPT | Mod: HCNC,CPTII,S$GLB, | Performed by: INTERNAL MEDICINE

## 2020-10-21 PROCEDURE — 3288F FALL RISK ASSESSMENT DOCD: CPT | Mod: HCNC,CPTII,S$GLB, | Performed by: INTERNAL MEDICINE

## 2020-10-21 PROCEDURE — 1126F AMNT PAIN NOTED NONE PRSNT: CPT | Mod: HCNC,S$GLB,, | Performed by: INTERNAL MEDICINE

## 2020-10-21 RX ORDER — SODIUM CHLORIDE 0.9 % (FLUSH) 0.9 %
10 SYRINGE (ML) INJECTION
Status: CANCELLED | OUTPATIENT
Start: 2020-10-22

## 2020-10-21 RX ORDER — HEPARIN 100 UNIT/ML
500 SYRINGE INTRAVENOUS
Status: CANCELLED | OUTPATIENT
Start: 2020-10-22

## 2020-10-21 NOTE — LETTER
October 21, 2020      Emelina Gaston MD  [address]  [facility] [address]           [facility] - Nephrology 5th Fl  [address]  [facility] [address]  Phone: [phone]  Fax: [phone]          Patient: Myesha Quinones   MR Number: [id number]   YOB: 1940   Date of Visit: 10/21/2020       Dear Dr. Emelina Gaston:    Thank you for referring Myesha Quinones to me for evaluation. Attached you will find relevant portions of my assessment and plan of care.    If you have questions, please do not hesitate to call me. I look forward to following Myesha Quinones along with you.    Sincerely,    Mary Jane Mccoy MD    Enclosure  CC:  No Recipients    If you would like to receive this communication electronically, please contact [email or web address] or [phone] to request more information on [facility] access.    For providers and/or their staff who would like to refer a patient to [facility], please contact us through our one-stop-shop provider referral line, [facility], at [phone].    If you feel you have received this communication in error or would no longer like to receive these types of communications, please e-mail [email or web address]

## 2020-10-21 NOTE — PROGRESS NOTES
REASON FOR CONSULT: CKD    REFERRING PHYSICIAN: Emelina Gaston MD      HISTORY OF PRESENT ILLNESS: 80 y.o. 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, with long-term current use of insulin (2/22/2018), COPD (chronic obstructive pulmonary disease), COPD exacerbation (4/8/2018), Defibrillator discharge, Diabetes mellitus, Diabetes mellitus type II, Diabetes with neurologic complications, Goiter, breast cancer, Hyperlipidemia, Hypertension, Iron deficiency anemia (5/16/2017), Left kidney mass, Meningioma, Osteoporosis, postmenopausal, Pacemaker, Pneumonia (12/8/2019), 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 patient was referred here for ckd management   The patient denies taking NSAIDs or new antibiotics, recreational drugs, recent episode of dehydration, diarrhea, nausea or vomiting, acute illness, hospitalization or exposure to IV radiocontrast.     ROS:  General: negative for chills, or fatigue  Respiratory: No cough, shortness of breath, or wheezing  Cardiovascular: No chest pain or dyspnea   Gastrointestinal: No abdominal pain, change in bowel habits  Genito-Urinary: No dysuria, trouble voiding, or  hematuria  Musculoskeletal: ROS: negative for - joint pain, joint stiffness, joint swelling, muscle pain or muscular weakness      PAST MEDICAL HISTORY:  Past Medical History:   Diagnosis Date    Acute hypoxemic respiratory failure 12/19/2019    Acute right-sided thoracic back pain 12/9/2019    Allergy     Asthma     Basal cell carcinoma     left forehead    Basal cell carcinoma     left nose    Basal cell carcinoma 05/20/2015    right nose    Basal cell carcinoma 12/22/2015    left lower post neck    Basal cell carcinoma 12/03/2019    left ant scalp     Breast cancer     CAD (coronary artery disease)     Cardiomyopathy     Cardiomyopathy, ischemic     Cataract     CHF (congestive heart failure)     Chronic kidney disease, stage 3, mod decreased GFR 2/14/2017    Colon polyp 2011    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, with long-term current use of insulin 2/22/2018    COPD (chronic obstructive pulmonary disease)     COPD exacerbation 4/8/2018    Defibrillator discharge     Diabetes mellitus     Diabetes mellitus type II     Diabetes with neurologic complications     Goiter     MNG    HX: breast cancer     Hyperlipidemia     Hypertension     Iron deficiency anemia 5/16/2017    Left kidney mass     Meningioma     Osteoporosis, postmenopausal     Pacemaker     Pneumonia 12/8/2019    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    Pseudomonas pneumonia     Skin cancer     s/p excision    Sleep apnea     CPAP    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/2015    mid forehead    Unspecified vitamin D deficiency     Ventricular tachycardia     Vitamin B12 deficiency     Vitamin D deficiency disease        PAST SURGICAL HISTORY:  Past Surgical History:   Procedure Laterality Date    BASAL CELL CARCINOMA EXCISION      posterior neck and nose    BREAST BIOPSY      BREAST CYST EXCISION Left     BREAST SURGERY       CARDIAC DEFIBRILLATOR PLACEMENT      x 2    CATARACT EXTRACTION W/  INTRAOCULAR LENS IMPLANT Bilateral     CHOLECYSTECTOMY      COLONOSCOPY N/A 11/5/2019    Procedure: COLONOSCOPY;  Surgeon: Boaz Botello MD;  Location: [facility] ENDO ([address] [place] [address]);  Service: Endoscopy;  Laterality: N/A;  AICD - Med[employer] - sm    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    FRACTURE SURGERY      left elbow and wrist as a child    HYSTERECTOMY      MASTECTOMY Right     REPLACEMENT OF IMPLANTABLE CARDIOVERTER-DEFIBRILLATOR (ICD) GENERATOR N/A 12/17/2018    Procedure: REPLACEMENT, ICD GENERATOR;  Surgeon: Jan Mckeon MD;  Location: [facility] EP LAB;  Service: Cardiology;  Laterality: N/A;  DONNA, CRT-D gen sulema, MDT, MAC, SK, 3 Prep    REVISION OF SKIN POCKET FOR CARDIOVERTER-DEFIBRILLATOR  12/17/2018    Procedure: Revision, Skin Pocket, For Cardioverter-Defibrillator;  Surgeon: Jan Mckeon MD;  Location: [facility] EP LAB;  Service: Cardiology;;    SQUAMOUS CELL CARCINOMA EXCISION      remved from forehead    TONSILLECTOMY         FAMILY HISTORY:   Family History   Problem Relation Age of Onset    Diabetes Father     Heart disease Father     Diabetes Sister     Heart disease Sister     Diabetes Brother     Heart disease Brother     Hypertension Brother     Diabetes Brother     Heart disease Brother     Hypertension Brother     Diabetes Brother     Heart disease Brother     Cancer Brother         colon    Diabetes Brother     Cancer Son         skin    Diabetes Son         prediabetes    Diabetes Daughter         prediabetes    Cancer Daughter         melanoma    Obesity Daughter     Melanoma Daughter     Diabetes Son     Asthma Mother     Hypertension Mother     Stroke Mother     No Known Problems Maternal Grandmother     No Known Problems Maternal Grandfather     No Known Problems Paternal Grandmother     No Known Problems Paternal Grandfather     Amblyopia Neg Hx     Blindness Neg Hx     Cataracts Neg  Hx     Glaucoma Neg Hx     Macular degeneration Neg Hx     Retinal detachment Neg Hx     Strabismus Neg Hx     Thyroid disease Neg Hx        SOCIAL HISTORY:  Social History     Socioeconomic History    Marital status:      Spouse name: Not on file    Number of children: Not on file    Years of education: Not on file    Highest education level: Not on file   Occupational History    Occupation: Homemaker     Employer: OTHER   Social Needs    Financial resource strain: Not on file    Food insecurity     Worry: Not on file     Inability: Not on file    Transportation needs     Medical: Not on file     Non-medical: Not on file   Tobacco Use    Smoking status: Never Smoker    Smokeless tobacco: Never Used   Substance and Sexual Activity    Alcohol use: No     Alcohol/week: 0.0 standard drinks    Drug use: No    Sexual activity: Yes     Partners: Male   Lifestyle    Physical activity     Days per week: Not on file     Minutes per session: Not on file    Stress: Not on file   Relationships    Social connections     Talks on phone: Not on file     Gets together: Not on file     Attends [employer] service: Not on file     Active member of club or organization: Not on file     Attends meetings of clubs or organizations: Not on file     Relationship status: Not on file   Other Topics Concern    Are you pregnant or think you may be? No    Breast-feeding No   Social History Narrative    Not on file       ALLERGIES:  Review of patient's allergies indicates:   Allergen Reactions    Iodine and iodide containing products Hives    Nifedipine      weakness       MEDICATIONS:    Current Outpatient Medications:     acetaminophen (TYLENOL) 500 MG tablet, Take 1,000 mg by mouth daily as needed for Pain., Disp: , Rfl:     albuterol (VENTOLIN HFA) 90 mcg/actuation inhaler, INHALE 2 PUFFS INTO THE LUNGS EVERY 6 (SIX) HOURS AS NEEDED FOR WHEEZING. RESCUE, Disp: 18 g, Rfl: 12    albuterol-ipratropium (DUO-NEB)  2.5 mg-0.5 mg/3 mL nebulizer solution, TAKE 1 VIAL BY NEBULIZATION EVERY 4 (FOUR) HOURS. RESCUE, Disp: 90 mL, Rfl: 3    aspirin (ENTERIC COATED ASPIRIN) 81 MG EC tablet, Take 1 tablet by mouth once daily. , Disp: , Rfl:     benzonatate (TESSALON PERLES) 100 MG capsule, Take 2 capsules (200 mg total) by mouth 3 (three) times daily as needed for Cough., Disp: 90 capsule, Rfl: 3    blood sugar diagnostic (ACCU-CHEK HECTOR) Strp, Uses Accu-Check Hector meter to test BG 4x/day, Disp: 400 strip, Rfl: 6    carvediloL (COREG) 25 MG tablet, TAKE 2 TABLETS (50 MG TOTAL) BY MOUTH 2 (TWO) TIMES DAILY., Disp: 360 tablet, Rfl: 3    chlorthalidone (HYGROTEN) 25 MG Tab, Take 0.5 tablets (12.5 mg total) by mouth once daily., Disp: 90 tablet, Rfl: 1    cholecalciferol, vitamin D3, (VITAMIN D3) 2,000 unit Tab, Take 1 tablet by mouth once daily. , Disp: , Rfl:     cloNIDine 0.1 mg/24 hr td ptwk (CATAPRES) 0.1 mg/24 hr, PLACE 1 PATCH ONTO THE SKIN EVERY 7 DAYS., Disp: 12 patch, Rfl: 3    CYANOCOBALAMIN, VITAMIN B-12, (B-12 DOTS ORAL), Take 1 tablet by mouth once daily., Disp: , Rfl:     diltiaZEM (CARDIZEM CD) 180 MG 24 hr capsule, Take 1 capsule (180 mg total) by mouth once daily., Disp: 90 capsule, Rfl: 3    doxycycline (VIBRA-TABS) 100 MG tablet, Take 1 tablet (100 mg total) by mouth every 12 (twelve) hours. Take with food and water, Disp: 20 tablet, Rfl: 0    fluticasone propionate (FLONASE) 50 mcg/actuation nasal spray, 2 sprays (100 mcg total) by Each Nostril route once daily., Disp: 16 g, Rfl: 12    fluticasone-salmeterol diskus inhaler 500-50 mcg, Inhale 1 puff into the lungs 2 (two) times daily. Controller, Disp: 60 each, Rfl: 11    hydrALAZINE (APRESOLINE) 100 MG tablet, Take 1 tablet (100 mg total) by mouth 3 (three) times daily., Disp: 270 tablet, Rfl: 2    insulin (LANTUS SOLOSTAR U-100 INSULIN) glargine 100 units/mL (3mL) SubQ pen, Inject 34 Units into the skin every evening. Cancel the 30 unit prescription,  "Disp: 1 Box, Rfl: 12    irbesartan (AVAPRO) 300 MG tablet, Take 1 tablet (300 mg total) by mouth every evening., Disp: 90 tablet, Rfl: 3    lancets (ACCU-CHEK SOFTCLIX LANCETS) Misc, Uses Accu-Chek Angelica meter to test BG 4x/day (Patient taking differently: Uses Accu-Chek Angelica meter to test BG 1x/day), Disp: 400 each, Rfl: 3    levocetirizine (XYZAL) 5 MG tablet, Take 1 tablet (5 mg total) by mouth every evening., Disp: 30 tablet, Rfl: 11    linaGLIPtin (TRADJENTA) 5 mg Tab tablet, Take 1 tablet (5 mg total) by mouth once daily., Disp: 90 tablet, Rfl: 3    magnesium oxide (MAG-OX) 400 mg tablet, Take 1 tablet (400 mg total) by mouth once daily., Disp: , Rfl: 0    metFORMIN (GLUCOPHAGE) 500 MG tablet, Take 1 tablet (500 mg total) by mouth 2 (two) times daily with meals., Disp: 180 tablet, Rfl: 6    montelukast (SINGULAIR) 10 mg tablet, Take 1 tablet (10 mg total) by mouth every evening., Disp: 30 tablet, Rfl: 11    nitroGLYCERIN (NITROSTAT) 0.4 MG SL tablet, Place 0.4 mg under the tongue every 5 (five) minutes as needed for Chest pain. , Disp: , Rfl:     omega-3 fatty acids (FISH OIL) 500 mg Cap, Take 1 capsule by mouth Twice daily., Disp: , Rfl:     pen needle, diabetic (BD ULTRA-FINE MINI PEN NEEDLE) 31 gauge x 3/16" Ndle, USE WITH LANTUS AT BEDTIME, Disp: 400 each, Rfl: 3    pravastatin (PRAVACHOL) 40 MG tablet, TAKE 1 TABLET BY MOUTH EVERY DAY, Disp: 90 tablet, Rfl: 3    predniSONE (DELTASONE) 20 MG tablet, Take one tablet a day for 7 days then 1/2 tablet a day for 14 days., Disp: 14 tablet, Rfl: 0    Current Facility-Administered Medications:     fluorescein 500 mg/5 mL (10 %) injection 500 mg, 5 mL, Intravenous, Once, DRandi Arechiga MD    Facility-Administered Medications Ordered in Other Visits:     0.9%  NaCl infusion, , Intravenous, Continuous, Chela Horne, NP   Medication List with Changes/Refills   Current Medications    ACETAMINOPHEN (TYLENOL) 500 MG TABLET    Take 1,000 mg by " mouth daily as needed for Pain.    ALBUTEROL (VENTOLIN HFA) 90 MCG/ACTUATION INHALER    INHALE 2 PUFFS INTO THE LUNGS EVERY 6 (SIX) HOURS AS NEEDED FOR WHEEZING. RESCUE    ALBUTEROL-IPRATROPIUM (DUO-NEB) 2.5 MG-0.5 MG/3 ML NEBULIZER SOLUTION    TAKE 1 VIAL BY NEBULIZATION EVERY 4 (FOUR) HOURS. RESCUE    ASPIRIN (ENTERIC COATED ASPIRIN) 81 MG EC TABLET    Take 1 tablet by mouth once daily.     BENZONATATE (TESSALON PERLES) 100 MG CAPSULE    Take 2 capsules (200 mg total) by mouth 3 (three) times daily as needed for Cough.    BLOOD SUGAR DIAGNOSTIC (ACCU-CHEK HECTOR) STRP    Uses Accu-Check Hector meter to test BG 4x/day    CARVEDILOL (COREG) 25 MG TABLET    TAKE 2 TABLETS (50 MG TOTAL) BY MOUTH 2 (TWO) TIMES DAILY.    CHLORTHALIDONE (HYGROTEN) 25 MG TAB    Take 0.5 tablets (12.5 mg total) by mouth once daily.    CHOLECALCIFEROL, VITAMIN D3, (VITAMIN D3) 2,000 UNIT TAB    Take 1 tablet by mouth once daily.     CLONIDINE 0.1 MG/24 HR TD PTWK (CATAPRES) 0.1 MG/24 HR    PLACE 1 PATCH ONTO THE SKIN EVERY 7 DAYS.    CYANOCOBALAMIN, VITAMIN B-12, (B-12 DOTS ORAL)    Take 1 tablet by mouth once daily.    DILTIAZEM (CARDIZEM CD) 180 MG 24 HR CAPSULE    Take 1 capsule (180 mg total) by mouth once daily.    DOXYCYCLINE (VIBRA-TABS) 100 MG TABLET    Take 1 tablet (100 mg total) by mouth every 12 (twelve) hours. Take with food and water    FLUTICASONE PROPIONATE (FLONASE) 50 MCG/ACTUATION NASAL SPRAY    2 sprays (100 mcg total) by Each Nostril route once daily.    FLUTICASONE-SALMETEROL DISKUS INHALER 500-50 MCG    Inhale 1 puff into the lungs 2 (two) times daily. Controller    HYDRALAZINE (APRESOLINE) 100 MG TABLET    Take 1 tablet (100 mg total) by mouth 3 (three) times daily.    INSULIN (LANTUS SOLOSTAR U-100 INSULIN) GLARGINE 100 UNITS/ML (3ML) SUBQ PEN    Inject 34 Units into the skin every evening. Cancel the 30 unit prescription    IRBESARTAN (AVAPRO) 300 MG TABLET    Take 1 tablet (300 mg total) by mouth every evening.     "LANCETS (ACCU-CHEK SOFTCLIX LANCETS) MISC    Uses Accu-Chek Angelica meter to test BG 4x/day    LEVOCETIRIZINE (XYZAL) 5 MG TABLET    Take 1 tablet (5 mg total) by mouth every evening.    LINAGLIPTIN (TRADJENTA) 5 MG TAB TABLET    Take 1 tablet (5 mg total) by mouth once daily.    MAGNESIUM OXIDE (MAG-OX) 400 MG TABLET    Take 1 tablet (400 mg total) by mouth once daily.    METFORMIN (GLUCOPHAGE) 500 MG TABLET    Take 1 tablet (500 mg total) by mouth 2 (two) times daily with meals.    MONTELUKAST (SINGULAIR) 10 MG TABLET    Take 1 tablet (10 mg total) by mouth every evening.    NITROGLYCERIN (NITROSTAT) 0.4 MG SL TABLET    Place 0.4 mg under the tongue every 5 (five) minutes as needed for Chest pain.     OMEGA-3 FATTY ACIDS (FISH OIL) 500 MG CAP    Take 1 capsule by mouth Twice daily.    PEN NEEDLE, DIABETIC (BD ULTRA-FINE MINI PEN NEEDLE) 31 GAUGE X 3/16" NDLE    USE WITH LANTUS AT BEDTIME    PRAVASTATIN (PRAVACHOL) 40 MG TABLET    TAKE 1 TABLET BY MOUTH EVERY DAY    PREDNISONE (DELTASONE) 20 MG TABLET    Take one tablet a day for 7 days then 1/2 tablet a day for 14 days.        PHYSICAL EXAM:  LMP  (LMP Unknown)     General: No distress, No fever or chills  Lungs:Clear to auscultation bilaterally, No Crackles  Heart: Regular rate and rhythm, no murmur, gallops or rubs  Abdomen: Soft, no tenderness, bowel sounds normal  Extremities: Atraumatic, no edema in LE      LABS:  Lab Results   Component Value Date    CREATININE 1.6 (H) 09/24/2020       Prot/Creat Ratio, Ur   Date Value Ref Range Status   01/17/2020 1.88 (H) 0.00 - 0.20 Final   06/17/2019 2.84 (H) 0.00 - 0.20 Final   03/27/2019 1.53 (H) 0.00 - 0.20 Final       Lab Results   Component Value Date     (L) 09/24/2020    K 4.7 09/24/2020    CO2 22 (L) 09/24/2020       last PTH   Lab Results   Component Value Date    PTH 72.0 03/26/2019    CALCIUM 9.1 09/24/2020    PHOS 4.0 12/21/2019       Lab Results   Component Value Date    HGB 9.2 (L) 09/24/2020        Lab " Results   Component Value Date    HGBA1C 7.6 (H) 07/17/2020       Lab Results   Component Value Date    LDLCALC 59.0 (L) 07/17/2020           ASSESSMENT:  1-CKD stage IIIb 2/2 diabetic nephropathy   2-DM type II uncontrolled  3-HTN   4- COPD/ASTHMA  5-non ischemic CMP /ICD  6-HLD  7-pulm HTN   8-obesity  9-AOCD        PLAN:  -pt has CKD for the last 10 years worsening slowly , her baseline scr 1.5 and GFR 30  -last UPCR from 1/2020 1.88 , will repeat it   -controlled BP, Continue current BP meds regimen-last a1c from July 7.6  -will get iron sat   -Avoid NSAIDs intake      RTC in 4m      Thanks for allowing me to participate in the care of this patient.     9:19 AM    ASHKAN CHEN MD  NEPHROLOGY ATTENDING

## 2020-10-23 NOTE — PROGRESS NOTES
[facility]: Health  Follow-Up    The history is provided by the patient.             Reason for review: Blood pressure not at goal        Topics Covered on Call: physical activity and Diet    Additional Follow-up details: Patient reports she is doing well. Patient's blood pressure average is continuing to overall improve. Asked patient about elevated readings, patient was unsure of cause.             Diet-no change to diet    No change to diet.  Patient reports eating or drinking the following: Patient reports she is continuing to monitor sodium intake. Patient did not want to focus on anything specific at this time.       Physical Activity-no change to routine  No change to exercise routine.       Additional physical activity details: Patient reports she is currently not participating in physical activity. Patient declined at home resources/ ideas.       Medication Adherence-Medication adherence was assessed.      Substance, Sleep, Stress-Not assessed      Continue current diet/physical activity routine.       Addressed patient questions and patient has my contact information if needed prior to next outreach.   Explained the importance of self-monitoring and medication adherence. Encouraged the patient to communicate with their health  for lifestyle modifications to help improve or maintain a healthy lifestyle.               There are no preventive care reminders to display for this patient.      Last 5 Patient Entered Readings                                      Current 30 Day Average: 141/61     Recent Readings 10/19/2020 10/16/2020 10/15/2020 10/9/2020 10/7/2020    SBP (mmHg) 149 147 148 129 138    DBP (mmHg) 68 63 68 57 56    Pulse 68 70 74 77 81

## 2020-10-29 ENCOUNTER — TELEPHONE (OUTPATIENT)
Dept: ELECTROPHYSIOLOGY | Facility: CLINIC | Age: 80
End: 2020-10-29

## 2020-10-29 DIAGNOSIS — I10 ESSENTIAL HYPERTENSION: ICD-10-CM

## 2020-11-02 ENCOUNTER — PATIENT OUTREACH (OUTPATIENT)
Dept: OTHER | Facility: OTHER | Age: 80
End: 2020-11-02

## 2020-11-02 RX ORDER — HYDRALAZINE HYDROCHLORIDE 100 MG/1
100 TABLET, FILM COATED ORAL 3 TIMES DAILY
Qty: 270 TABLET | Refills: 2 | Status: SHIPPED | OUTPATIENT
Start: 2020-11-02 | End: 2021-09-22

## 2020-11-02 NOTE — PROGRESS NOTES
Digital Medicine: Clinician Follow-Up    Called patient to follow-up on medication adjustment made by cardiology.    The history is provided by the patient.   Follow-up reason(s): medication change follow-up.     Hypertension    Patient's blood pressure readings are labile.   Additional Follow-up details: Patient confirms dose increase and is doing well with no complaints or side effect concerns. She denies hypotensive s/s with low DBP readings.    Patient did make medication change.    Is patient tolerating med change? yes        Last 5 Patient Entered Readings                                      Current 30 Day Average: 141/61     Recent Readings 10/23/2020 10/19/2020 10/16/2020 10/15/2020 10/9/2020    SBP (mmHg) 142 149 147 148 129    DBP (mmHg) 61 68 63 68 57    Pulse 68 68 70 74 77          Depression Screening  Did not address depression screening.    Sleep Apnea Screening    Did not address sleep apnea screening.     Medication Affordability Screening  Did not address medication affordability screening.     Medication Adherence-Medication Adherence not addressed.          ASSESSMENT(S)  Patients BP average is 141/61 mmHg, which is at goal. Patient's BP goal is less than or equal to 140/90.    Overall BP average is trending down and is now much closer to goal of < 140/90.      Hypertension Plan  Continue current therapy.  Provided patient education.       Addressed patient questions and patient has my contact information if needed prior to next outreach. Patient verbalizes understanding.             There are no preventive care reminders to display for this patient.  There are no preventive care reminders to display for this patient.      Hypertension Medications             carvediloL (COREG) 25 MG tablet TAKE 2 TABLETS (50 MG TOTAL) BY MOUTH 2 (TWO) TIMES DAILY.    chlorthalidone (HYGROTEN) 25 MG Tab Take 0.5 tablets (12.5 mg total) by mouth once daily.    cloNIDine 0.1 mg/24 hr td ptwk (CATAPRES) 0.1 mg/24  hr PLACE 1 PATCH ONTO THE SKIN EVERY 7 DAYS.    diltiaZEM (CARDIZEM CD) 180 MG 24 hr capsule Take 1 capsule (180 mg total) by mouth once daily.    hydrALAZINE (APRESOLINE) 100 MG tablet Take 1 tablet (100 mg total) by mouth 3 (three) times daily.    irbesartan (AVAPRO) 300 MG tablet Take 1 tablet (300 mg total) by mouth every evening.    nitroGLYCERIN (NITROSTAT) 0.4 MG SL tablet Place 0.4 mg under the tongue every 5 (five) minutes as needed for Chest pain.

## 2020-11-10 ENCOUNTER — TELEPHONE (OUTPATIENT)
Dept: OTOLARYNGOLOGY | Facility: CLINIC | Age: 80
End: 2020-11-10

## 2020-11-10 NOTE — TELEPHONE ENCOUNTER
Called patient in regards to upcoming appointment on the 20th. Patient stated that she feels fine but does still want Dr. Miranda to look in her ears. She denied the audiogram appointment and said she has had an audiogram and does not feel like getting another one at the moment. Patient thanked me for calling.

## 2020-11-11 ENCOUNTER — PATIENT OUTREACH (OUTPATIENT)
Dept: OTHER | Facility: OTHER | Age: 80
End: 2020-11-11

## 2020-11-11 NOTE — PROGRESS NOTES
"Digital Medicine: Clinician Follow-Up    Called patient to review recent high blood pressure alert received on 11/10/2020.    The history is provided by the patient.   Follow-up reason(s): Alert received.   Care Team received high BP alert.  181/77 on 11/10/2020.  Patient states at the time of the alert she felt "funny". She states it is difficult to explain but she felt really tired like she couldn't get up. She reports feeling much better today. Encouraged patient to check another readings at some point today to ensure readings trending down. She confirms she has continued taking diltiazem and is tolerating well.        Hypertension    Readings are trending up Patient is not experiencing signs/symptoms of hypertension.        Last 5 Patient Entered Readings                                      Current 30 Day Average: 152/67     Recent Readings 11/10/2020 11/7/2020 11/2/2020 10/23/2020 10/19/2020    SBP (mmHg) 181 149 150 142 149    DBP (mmHg) 77 69 66 61 68    Pulse 77 77 70 68 68          Depression Screening  Did not address depression screening.    Sleep Apnea Screening    Did not address sleep apnea screening.     Medication Affordability Screening  Did not address medication affordability screening.     Medication Adherence-Medication adherence was assessed.          ASSESSMENT(S)  Patients BP average is 152/67 mmHg, which is above goal. Patient's BP goal is less than or equal to 140/90.     Hypertension Plan  Additional monitoring needed.  Continue current therapy.  Provided patient education.       Addressed patient questions and patient has my contact information if needed prior to next outreach. Patient verbalizes understanding.             There are no preventive care reminders to display for this patient.  There are no preventive care reminders to display for this patient.      Hypertension Medications             carvediloL (COREG) 25 MG tablet TAKE 2 TABLETS (50 MG TOTAL) BY MOUTH 2 (TWO) TIMES " DAILY.    chlorthalidone (HYGROTEN) 25 MG Tab Take 0.5 tablets (12.5 mg total) by mouth once daily.    cloNIDine 0.1 mg/24 hr td ptwk (CATAPRES) 0.1 mg/24 hr PLACE 1 PATCH ONTO THE SKIN EVERY 7 DAYS.    diltiaZEM (CARDIZEM CD) 180 MG 24 hr capsule Take 1 capsule (180 mg total) by mouth once daily.    hydrALAZINE (APRESOLINE) 100 MG tablet Take 1 tablet (100 mg total) by mouth 3 (three) times daily.    irbesartan (AVAPRO) 300 MG tablet Take 1 tablet (300 mg total) by mouth every evening.    nitroGLYCERIN (NITROSTAT) 0.4 MG SL tablet Place 0.4 mg under the tongue every 5 (five) minutes as needed for Chest pain.

## 2020-11-13 ENCOUNTER — TELEPHONE (OUTPATIENT)
Dept: INTERNAL MEDICINE | Facility: CLINIC | Age: 80
End: 2020-11-13

## 2020-11-13 ENCOUNTER — HOSPITAL ENCOUNTER (EMERGENCY)
Facility: HOSPITAL | Age: 80
Discharge: HOME | End: 2020-11-13
Attending: EMERGENCY MEDICINE
Payer: MEDICARE

## 2020-11-13 VITALS
TEMPERATURE: 98 F | HEART RATE: 82 BPM | OXYGEN SATURATION: 94 % | RESPIRATION RATE: 18 BRPM | DIASTOLIC BLOOD PRESSURE: 80 MMHG | SYSTOLIC BLOOD PRESSURE: 130 MMHG

## 2020-11-13 DIAGNOSIS — R53.81 MALAISE: ICD-10-CM

## 2020-11-13 DIAGNOSIS — J90 PLEURAL EFFUSION: ICD-10-CM

## 2020-11-13 DIAGNOSIS — R06.02 SOB (SHORTNESS OF BREATH): ICD-10-CM

## 2020-11-13 DIAGNOSIS — J98.4 PNEUMONITIS: Primary | ICD-10-CM

## 2020-11-13 DIAGNOSIS — R06.02 SHORTNESS OF BREATH: Primary | ICD-10-CM

## 2020-11-13 LAB
ALBUMIN SERPL BCP-MCNC: 2.9 G/DL (ref 3.5–5.2)
ALP SERPL-CCNC: 90 U/L (ref 55–135)
ALT SERPL W/O P-5'-P-CCNC: 22 U/L (ref 10–44)
ANION GAP SERPL CALC-SCNC: 12 MMOL/L (ref 8–16)
AST SERPL-CCNC: 30 U/L (ref 10–40)
BACTERIA #/AREA URNS AUTO: NORMAL /HPF
BASOPHILS # BLD AUTO: 0.07 K/UL (ref 0–0.2)
BASOPHILS NFR BLD: 0.9 % (ref 0–1.9)
BILIRUB SERPL-MCNC: 0.3 MG/DL (ref 0.1–1)
BILIRUB UR QL STRIP: NEGATIVE
BUN SERPL-MCNC: 26 MG/DL (ref 8–23)
CALCIUM SERPL-MCNC: 8.9 MG/DL (ref 8.7–10.5)
CHLORIDE SERPL-SCNC: 100 MMOL/L (ref 95–110)
CLARITY UR REFRACT.AUTO: CLEAR
CO2 SERPL-SCNC: 19 MMOL/L (ref 23–29)
COLOR UR AUTO: ABNORMAL
CREAT SERPL-MCNC: 1.8 MG/DL (ref 0.5–1.4)
CTP QC/QA: YES
CTP QC/QA: YES
DIFFERENTIAL METHOD: ABNORMAL
EOSINOPHIL # BLD AUTO: 0.3 K/UL (ref 0–0.5)
EOSINOPHIL NFR BLD: 4.3 % (ref 0–8)
ERYTHROCYTE [DISTWIDTH] IN BLOOD BY AUTOMATED COUNT: 12.9 % (ref 11.5–14.5)
EST. GFR  (AFRICAN AMERICAN): 30 ML/MIN/1.73 M^2
EST. GFR  (NON AFRICAN AMERICAN): 26 ML/MIN/1.73 M^2
GLUCOSE SERPL-MCNC: 137 MG/DL (ref 70–110)
GLUCOSE UR QL STRIP: NEGATIVE
HCT VFR BLD AUTO: 30.7 % (ref 37–48.5)
HGB BLD-MCNC: 9.8 G/DL (ref 12–16)
HGB UR QL STRIP: NEGATIVE
HYALINE CASTS UR QL AUTO: 0 /LPF
IMM GRANULOCYTES # BLD AUTO: 0.12 K/UL (ref 0–0.04)
IMM GRANULOCYTES NFR BLD AUTO: 1.5 % (ref 0–0.5)
KETONES UR QL STRIP: NEGATIVE
LEUKOCYTE ESTERASE UR QL STRIP: ABNORMAL
LYMPHOCYTES # BLD AUTO: 1.5 K/UL (ref 1–4.8)
LYMPHOCYTES NFR BLD: 18.5 % (ref 18–48)
MCH RBC QN AUTO: 29.8 PG (ref 27–31)
MCHC RBC AUTO-ENTMCNC: 31.9 G/DL (ref 32–36)
MCV RBC AUTO: 93 FL (ref 82–98)
MICROSCOPIC COMMENT: NORMAL
MONOCYTES # BLD AUTO: 0.6 K/UL (ref 0.3–1)
MONOCYTES NFR BLD: 8.1 % (ref 4–15)
NEUTROPHILS # BLD AUTO: 5.2 K/UL (ref 1.8–7.7)
NEUTROPHILS NFR BLD: 66.7 % (ref 38–73)
NITRITE UR QL STRIP: NEGATIVE
NRBC BLD-RTO: 0 /100 WBC
PH UR STRIP: 6 [PH] (ref 5–8)
PLATELET # BLD AUTO: 247 K/UL (ref 150–350)
PMV BLD AUTO: 10.3 FL (ref 9.2–12.9)
POC MOLECULAR INFLUENZA A AGN: NEGATIVE
POC MOLECULAR INFLUENZA B AGN: NEGATIVE
POTASSIUM SERPL-SCNC: 4.7 MMOL/L (ref 3.5–5.1)
PROT SERPL-MCNC: 7 G/DL (ref 6–8.4)
PROT UR QL STRIP: ABNORMAL
RBC # BLD AUTO: 3.29 M/UL (ref 4–5.4)
RBC #/AREA URNS AUTO: 1 /HPF (ref 0–4)
SARS-COV-2 RDRP RESP QL NAA+PROBE: NEGATIVE
SODIUM SERPL-SCNC: 131 MMOL/L (ref 136–145)
SP GR UR STRIP: 1 (ref 1–1.03)
SQUAMOUS #/AREA URNS AUTO: 1 /HPF
TROPONIN I SERPL DL<=0.01 NG/ML-MCNC: 0.01 NG/ML (ref 0–0.03)
URN SPEC COLLECT METH UR: ABNORMAL
WBC # BLD AUTO: 7.82 K/UL (ref 3.9–12.7)
WBC #/AREA URNS AUTO: 1 /HPF (ref 0–5)

## 2020-11-13 PROCEDURE — 99285 EMERGENCY DEPT VISIT HI MDM: CPT | Mod: CS,,, | Performed by: PHYSICIAN ASSISTANT

## 2020-11-13 PROCEDURE — 80053 COMPREHEN METABOLIC PANEL: CPT | Mod: HCNC

## 2020-11-13 PROCEDURE — 85025 COMPLETE CBC W/AUTO DIFF WBC: CPT | Mod: HCNC

## 2020-11-13 PROCEDURE — U0002 COVID-19 LAB TEST NON-CDC: HCPCS | Mod: HCNC | Performed by: PHYSICIAN ASSISTANT

## 2020-11-13 PROCEDURE — 93010 EKG 12-LEAD: ICD-10-PCS | Mod: HCNC,,, | Performed by: INTERNAL MEDICINE

## 2020-11-13 PROCEDURE — 99285 EMERGENCY DEPT VISIT HI MDM: CPT | Mod: 25,HCNC,CS

## 2020-11-13 PROCEDURE — 87502 INFLUENZA DNA AMP PROBE: CPT | Mod: HCNC

## 2020-11-13 PROCEDURE — 36000 PLACE NEEDLE IN VEIN: CPT | Mod: HCNC

## 2020-11-13 PROCEDURE — 81001 URINALYSIS AUTO W/SCOPE: CPT | Mod: HCNC

## 2020-11-13 PROCEDURE — 99285 PR EMERGENCY DEPT VISIT,LEVEL V: ICD-10-PCS | Mod: CS,,, | Performed by: PHYSICIAN ASSISTANT

## 2020-11-13 PROCEDURE — 93010 ELECTROCARDIOGRAM REPORT: CPT | Mod: HCNC,,, | Performed by: INTERNAL MEDICINE

## 2020-11-13 PROCEDURE — 93005 ELECTROCARDIOGRAM TRACING: CPT | Mod: HCNC

## 2020-11-13 PROCEDURE — 84484 ASSAY OF TROPONIN QUANT: CPT | Mod: HCNC

## 2020-11-13 PROCEDURE — 25000003 PHARM REV CODE 250: Mod: HCNC | Performed by: PHYSICIAN ASSISTANT

## 2020-11-13 RX ORDER — DOXYCYCLINE 100 MG/1
100 CAPSULE ORAL 2 TIMES DAILY
Qty: 20 CAPSULE | Refills: 0 | Status: SHIPPED | OUTPATIENT
Start: 2020-11-13 | End: 2020-11-17 | Stop reason: SDUPTHER

## 2020-11-13 RX ORDER — DOXYCYCLINE HYCLATE 100 MG
100 TABLET ORAL
Status: DISCONTINUED | OUTPATIENT
Start: 2020-11-13 | End: 2020-11-13

## 2020-11-13 RX ORDER — AMOXICILLIN AND CLAVULANATE POTASSIUM 875; 125 MG/1; MG/1
1 TABLET, FILM COATED ORAL 2 TIMES DAILY
Qty: 14 TABLET | Refills: 0 | Status: ON HOLD | OUTPATIENT
Start: 2020-11-13 | End: 2020-12-09 | Stop reason: HOSPADM

## 2020-11-13 RX ORDER — AMOXICILLIN AND CLAVULANATE POTASSIUM 875; 125 MG/1; MG/1
1 TABLET, FILM COATED ORAL
Status: COMPLETED | OUTPATIENT
Start: 2020-11-13 | End: 2020-11-13

## 2020-11-13 RX ADMIN — AMOXICILLIN AND CLAVULANATE POTASSIUM 1 TABLET: 875; 125 TABLET, FILM COATED ORAL at 03:11

## 2020-11-13 NOTE — TELEPHONE ENCOUNTER
----- Message from Padmaja Terrell sent at 11/13/2020 11:32 AM CST -----  Contact: [phone]  Patient is requesting a call back from the nurse in regards to her lab [facility]. Please advise

## 2020-11-13 NOTE — TELEPHONE ENCOUNTER
----- Message from Orquidea Castillo MA sent at 11/13/2020  9:06 AM CST -----  Contact: Patient [phone]    ----- Message -----  From: Lizet Quezada  Sent: 11/13/2020   8:59 AM CST  To: Alek [facility]ARA Staff    Patient was seen in the ER last night and was diagnosed with pneumonia.    Please call and advise.    Thank You

## 2020-11-13 NOTE — ED TRIAGE NOTES
Myesha Quinones, a 80 y.o. female presents to the ED w/ complaint of bilateral feet numbness and right hand tingling since 2000 yesterday. Pt is tearful at this time.     Triage note:  Chief Complaint   Patient presents with    Numbness     Pt arrives via EMS c/o numbness to bilateral hands and feet that began yesterday evening.     Review of patient's allergies indicates:   Allergen Reactions    Iodine and iodide containing products Hives    Nifedipine      weakness     Past Medical History:   Diagnosis Date    Acute hypoxemic respiratory failure 12/19/2019    Acute right-sided thoracic back pain 12/9/2019    Allergy     Asthma     Basal cell carcinoma     left forehead    Basal cell carcinoma     left nose    Basal cell carcinoma 05/20/2015    right nose    Basal cell carcinoma 12/22/2015    left lower post neck    Basal cell carcinoma 12/03/2019    left ant scalp     Breast cancer     CAD (coronary artery disease)     Cardiomyopathy     Cardiomyopathy, ischemic     Cataract     CHF (congestive heart failure)     Chronic kidney disease, stage 3, mod decreased GFR 2/14/2017    Colon polyp 2011    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, with long-term current use of insulin 2/22/2018    COPD (chronic obstructive pulmonary disease)     COPD exacerbation 4/8/2018    Defibrillator discharge     Diabetes mellitus     Diabetes mellitus type II     Diabetes with neurologic complications     Goiter     MNG    HX: breast cancer     Hyperlipidemia     Hypertension     Iron deficiency anemia 5/16/2017    Left kidney mass     Meningioma     Osteoporosis, postmenopausal     Pacemaker     Pneumonia 12/8/2019    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 Affect is appropriate.  Speech is appropriate and clear.     APPEARANCE: Patient resting comfortably in no acute distress.  Patient is clean and well groomed.    SKIN: The skin is warm and dry; color consistent with ethnicity.  Patient has normal skin turgor and moist mucus membranes.  Skin intact; no breakdown or bruising noted.     MUSCULOSKELETAL: Patient moving upper and lower extremities without difficulty. Generalized weakness.     RESPIRATORY: Airway is open and patent. Respirations spontaneous, even, easy, and non-labored.  Patient has a normal effort and rate.  No accessory muscle use noted. Denies cough.     CARDIAC:  Normal rhythm and rate noted.  No peripheral edema noted. No complaints of chest pain.      ABDOMEN: Soft and non tender to palpation.  No distention noted.     NEUROLOGIC: Eyes open spontaneously.  Behavior appropriate to situation.  Follows commands; facial expression symmetrical.  Purposeful motor response noted; bilateral feet numbness and right hand tingling.

## 2020-11-13 NOTE — ED NOTES
This test utilizes isothermal nucleic acid amplification   technology to detect the SARS-CoV-2 RdRp nucleic acid segment.   The analytical sensitivity (limit of detection) is 125 genome   equivalents/mL.   A POSITIVE result implies infection with the SARS-CoV-2 virus;   the patient is presumed to be contagious.     A NEGATIVE result means that SARS-CoV-2 nucleic acids are not   present above the limit of detection. A NEGATIVE result should be   treated as presumptive. It does not rule out the possibility of   COVID-19 and should not be the sole basis for treatment decisions.   If COVID-19 is strongly suspected based on clinical and exposure   history, re-testing using an alternate molecular assay should be   considered.   This test is only for use under the Food and Drug   Administration s Emergency Use Authorization (EUA).   Commercial kits are provided by [employer].   Performance characteristics of the EUA have been independently   verified by [facility] Department of   Pathology and Laboratory Medicine.   _________________________________________________________________   The authorized Fact Sheet for Healthcare Providers and the authorized Fact   Sheet for Patients of the ID NOW COVID-19 are available on the FDA   website:     https://www.fda.gov/media/953798/download  https://www.fda.gov/media/936576/download

## 2020-11-13 NOTE — ED PROVIDER NOTES
Encounter Date: 11/13/2020       History     Chief Complaint   Patient presents with    Numbness     Pt arrives via EMS c/o numbness to bilateral hands and feet that began yesterday evening.     80-year-old female to the ER with a chief complain of bilateral hand and feet numbness and tingling and generalized malaise.  Symptoms began around 8:00 p.m. on November 12th.  She denies any fevers or chills.  She denies any chest pain or shortness of breath.  She does appear very anxious on my assessment she did not take any medications for her symptoms.        Review of patient's allergies indicates:   Allergen Reactions    Iodine and iodide containing products Hives    Nifedipine      weakness     Past Medical History:   Diagnosis Date    Acute hypoxemic respiratory failure 12/19/2019    Acute right-sided thoracic back pain 12/9/2019    Allergy     Asthma     Basal cell carcinoma     left forehead    Basal cell carcinoma     left nose    Basal cell carcinoma 05/20/2015    right nose    Basal cell carcinoma 12/22/2015    left lower post neck    Basal cell carcinoma 12/03/2019    left ant scalp     Breast cancer     CAD (coronary artery disease)     Cardiomyopathy     Cardiomyopathy, ischemic     Cataract     CHF (congestive heart failure)     Chronic kidney disease, stage 3, mod decreased GFR 2/14/2017    Colon polyp 2011    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, with long-term current use of insulin 2/22/2018    COPD (chronic obstructive pulmonary disease)     COPD exacerbation 4/8/2018    Defibrillator discharge     Diabetes mellitus     Diabetes mellitus type II     Diabetes with neurologic complications     Goiter     MNG    HX: breast cancer     Hyperlipidemia     Hypertension     Iron deficiency anemia 5/16/2017    Left kidney mass     Meningioma     Osteoporosis, postmenopausal     Pacemaker      Pneumonia 12/8/2019    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    Pseudomonas pneumonia     Skin cancer     s/p excision    Sleep apnea     CPAP    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/2015    mid forehead    Unspecified vitamin D deficiency     Ventricular tachycardia     Vitamin B12 deficiency     Vitamin D deficiency disease      Past Surgical History:   Procedure Laterality Date    BASAL CELL CARCINOMA EXCISION      posterior neck and nose    BREAST BIOPSY      BREAST CYST EXCISION Left     BREAST SURGERY      CARDIAC DEFIBRILLATOR PLACEMENT      x 2    CATARACT EXTRACTION W/  INTRAOCULAR LENS IMPLANT Bilateral     CHOLECYSTECTOMY      COLONOSCOPY N/A 11/5/2019    Procedure: COLONOSCOPY;  Surgeon: Boaz Btoello MD;  Location: [facility] ENDO (4TH FLR);  Service: Endoscopy;  Laterality: N/A;  [employer] [place] -     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    FRACTURE SURGERY      left elbow and wrist as a child    HYSTERECTOMY      MASTECTOMY Right     REPLACEMENT OF IMPLANTABLE CARDIOVERTER-DEFIBRILLATOR (ICD) GENERATOR N/A 12/17/2018    Procedure: REPLACEMENT, ICD GENERATOR;  Surgeon: Jan Mckeon MD;  Location: [facility] EP LAB;  Service: Cardiology;  Laterality: N/A;  DONNA, CRT-D gen sulema, MDT, MAC, SK, 3 Prep    REVISION OF SKIN POCKET FOR CARDIOVERTER-DEFIBRILLATOR  12/17/2018    Procedure: Revision, Skin Pocket, For Cardioverter-Defibrillator;  Surgeon: Jan Mckeon MD;  Location: [facility] EP LAB;  Service: Cardiology;;    SQUAMOUS CELL CARCINOMA EXCISION      remved from forehead    TONSILLECTOMY       Family History   Problem Relation Age of Onset    Diabetes Father     Heart disease Father     Diabetes Sister     Heart disease Sister     Diabetes Brother     Heart disease Brother     Hypertension Brother     Diabetes Brother     Heart disease Brother     Hypertension Brother     Diabetes Brother     Heart disease Brother     Cancer Brother         colon    Diabetes  Brother     Cancer Son         skin    Diabetes Son         prediabetes    Diabetes Daughter         prediabetes    Cancer Daughter         melanoma    Obesity Daughter     Melanoma Daughter     Diabetes Son     Asthma Mother     Hypertension Mother     Stroke Mother     No Known Problems Maternal Grandmother     No Known Problems Maternal Grandfather     No Known Problems Paternal Grandmother     No Known Problems Paternal Grandfather     Amblyopia Neg Hx     Blindness Neg Hx     Cataracts Neg Hx     Glaucoma Neg Hx     Macular degeneration Neg Hx     Retinal detachment Neg Hx     Strabismus Neg Hx     Thyroid disease Neg Hx      Social History     Tobacco Use    Smoking status: Never Smoker    Smokeless tobacco: Never Used   Substance Use Topics    Alcohol use: No     Alcohol/week: 0.0 standard drinks    Drug use: No     Review of Systems   Constitutional: Negative for fever.        Generalized malaise   HENT: Negative for sore throat.    Respiratory: Negative for shortness of breath.    Cardiovascular: Negative for chest pain.   Gastrointestinal: Negative for nausea.   Genitourinary: Negative for dysuria.   Musculoskeletal: Positive for myalgias. Negative for back pain.   Skin: Negative for rash.   Neurological: Negative for weakness.        Extremity tingling   Hematological: Does not bruise/bleed easily.       Physical Exam     Initial Vitals   BP Pulse Resp Temp SpO2   11/13/20 0135 11/13/20 0135 11/13/20 0135 11/13/20 0136 11/13/20 0135   130/80 82 18 97.6 °F (36.4 °C) (!) 94 %      MAP       --                Physical Exam    Constitutional: Vital signs are normal. She appears well-developed and well-nourished. She is not diaphoretic. No distress.   HENT:   Head: Normocephalic and atraumatic.   Right Ear: Hearing and external ear normal.   Left Ear: Hearing and external ear normal.   Eyes: Conjunctivae are normal.   Cardiovascular: Normal rate and regular rhythm.   Pulmonary/Chest:    Clear on exam   Abdominal: Soft. Normal appearance and bowel sounds are normal.   Soft and nontender   Musculoskeletal: Normal range of motion.   Neurological: She is alert and oriented to person, place, and time.   Benign, nonfocal, no acute red flags or deficits   Skin: Skin is warm and intact.   Psychiatric: She has a normal mood and affect. Her speech is normal and behavior is normal. Cognition and memory are normal.         ED Course   Procedures  Labs Reviewed   CBC W/ AUTO DIFFERENTIAL - Abnormal; Notable for the following components:       Result Value    RBC 3.29 (*)     Hemoglobin 9.8 (*)     Hematocrit 30.7 (*)     MCHC 31.9 (*)     Immature Granulocytes 1.5 (*)     Immature Grans (Abs) 0.12 (*)     All other components within normal limits   COMPREHENSIVE METABOLIC PANEL - Abnormal; Notable for the following components:    Sodium 131 (*)     CO2 19 (*)     Glucose 137 (*)     BUN 26 (*)     Creatinine 1.8 (*)     Albumin 2.9 (*)     eGFR if  30.0 (*)     eGFR if non  26.0 (*)     All other components within normal limits   URINALYSIS, REFLEX TO URINE CULTURE - Abnormal; Notable for the following components:    Protein, UA 2+ (*)     Leukocytes, UA 1+ (*)     All other components within normal limits    Narrative:     Specimen Source->Urine   TROPONIN I   URINALYSIS MICROSCOPIC    Narrative:     Specimen Source->Urine   SARS-COV-2 RDRP GENE    Narrative:     This test utilizes isothermal nucleic acid amplification   technology to detect the SARS-CoV-2 RdRp nucleic acid segment.   The analytical sensitivity (limit of detection) is 125 genome   equivalents/mL.   A POSITIVE result implies infection with the SARS-CoV-2 virus;   the patient is presumed to be contagious.     A NEGATIVE result means that SARS-CoV-2 nucleic acids are not   present above the limit of detection. A NEGATIVE result should be   treated as presumptive. It does not rule out the possibility of   COVID-19  "and should not be the sole basis for treatment decisions.   If COVID-19 is strongly suspected based on clinical and exposure   history, re-testing using an alternate molecular assay should be   considered.   This test is only for use under the Food and Drug   Administration s Emergency Use Authorization (EUA).   Commercial kits are provided by [employer].   Performance characteristics of the EUA have been independently   verified by [facility] Department of   Pathology and Laboratory Medicine.   _________________________________________________________________   The authorized Fact Sheet for Healthcare Providers and the authorized Fact   Sheet for Patients of the ID NOW COVID-19 are available on the FDA   website:     https://www.fda.gov/media/593804/download  https://www.fda.gov/media/144590/download         POCT INFLUENZA A/B MOLECULAR     EKG Readings: (Independently Interpreted)   Paced rhythm       Imaging Results          X-Ray Chest AP Portable (Final result)  Result time 11/13/20 03:11:57    Final result by Dheeraj Bravo MD (11/13/20 03:11:57)                 Impression:      Patchy bilateral interstitial opacities suggestive of pneumonitis or edema.  Small bilateral pleural effusions.      Electronically signed by: Dheeraj Bravo MD  Date:    11/13/2020  Time:    03:11             Narrative:    EXAMINATION:  XR CHEST AP PORTABLE    CLINICAL HISTORY:  Provided history is "  Shortness of breath".    TECHNIQUE:  One view of the chest.    COMPARISON:  09/24/2020.    FINDINGS:  Cardiomediastinal silhouette is not significantly enlarged.  Left chest wall AICD is present with transvenous leads overlying the heart.  Atherosclerotic calcifications overlie the aortic arch.  There are patchy bilateral interstitial opacities which appear new when compared with the prior study.  No confluent area of consolidation.  Possible small bilateral pleural effusions.  No pneumothorax.                    "              Medical Decision Making:   History:   Old Medical Records: I decided to obtain old medical records.  Initial Assessment:   80-year-old female presenting with myalgia and extremity tingling  Differential Diagnosis:   Anxiety, diabetic neuropathy, COVID-19, electrolyte abnormality, UTI  Independently Interpreted Test(s):   I have ordered and independently interpreted X-rays - see summary below.       <> Summary of X-Ray Reading(s): Bilateral infiltrates on Xray  Clinical Tests:   Lab Tests: Ordered and Reviewed  Radiological Study: Ordered and Reviewed  Medical Tests: Ordered and Reviewed  ED Management:  Plan:  Routine labs, COVID swab for flu swab, chest x-ray, urinalysis and reassessment.  Patient's vital signs are stable.  Her physical exam is benign.  I will continue to monitor.    0315am  No acute findings on labs, cardiac markers WNL, Covid and Flu negative.   CXR with findings of possible pneumonitis. Pt has no fever, cough or chills, she has no SOB, leg edema or other clinical findings of CHF. I will treat with ABX to cover for pneumonia and will DC home. o2 @ 94% ORA on arrival and pt non distressed, no tachycardic, I doubt PE.                              Clinical Impression:       ICD-10-CM ICD-9-CM   1. Pneumonitis  J18.9 486   2. SOB (shortness of breath)  R06.02 786.05   3. Malaise  R53.81 780.79                      Disposition:   Disposition: Discharged  Condition: Stable     ED Disposition Condition    Discharge Stable        ED Prescriptions     Medication Sig Dispense Start Date End Date Auth. Provider    doxycycline (VIBRAMYCIN) 100 MG Cap Take 1 capsule (100 mg total) by mouth 2 (two) times daily. for 10 days 20 capsule 11/13/2020 11/23/2020 Nathanael Patel PA-C        Follow-up Information     Follow up With Specialties Details Why Contact Info    Emelina Gaston MD Internal Medicine   [address] [place] [id number] [phone]                                          Nathanael Patel PA-C  11/13/20 0319

## 2020-11-13 NOTE — TELEPHONE ENCOUNTER
Orquidea did you get the message about a Cardiology appt next week and UC appt next just to check her?  Thanks

## 2020-11-13 NOTE — TELEPHONE ENCOUNTER
Galen Gaston she said she already have an appt scheduled with you she didn't want to make another UC appt and she said she was gonna contact her cardiologist

## 2020-11-14 ENCOUNTER — CLINICAL SUPPORT (OUTPATIENT)
Dept: CARDIOLOGY | Facility: HOSPITAL | Age: 80
End: 2020-11-14
Payer: MEDICARE

## 2020-11-14 DIAGNOSIS — I25.5 ISCHEMIC CARDIOMYOPATHY: ICD-10-CM

## 2020-11-14 DIAGNOSIS — Z95.810 PRESENCE OF AUTOMATIC (IMPLANTABLE) CARDIAC DEFIBRILLATOR: ICD-10-CM

## 2020-11-14 PROCEDURE — 93295 DEV INTERROG REMOTE 1/2/MLT: CPT | Mod: ,,, | Performed by: INTERNAL MEDICINE

## 2020-11-14 PROCEDURE — 93295 CARDIAC DEVICE CHECK - REMOTE: ICD-10-PCS | Mod: ,,, | Performed by: INTERNAL MEDICINE

## 2020-11-14 PROCEDURE — 93296 REM INTERROG EVL PM/IDS: CPT | Mod: HCNC | Performed by: INTERNAL MEDICINE

## 2020-11-16 ENCOUNTER — LAB VISIT (OUTPATIENT)
Dept: LAB | Facility: HOSPITAL | Age: 80
End: 2020-11-16
Attending: INTERNAL MEDICINE
Payer: MEDICARE

## 2020-11-16 DIAGNOSIS — I10 ESSENTIAL HYPERTENSION: ICD-10-CM

## 2020-11-16 DIAGNOSIS — J90 PLEURAL EFFUSION: ICD-10-CM

## 2020-11-16 DIAGNOSIS — E11.3213 CONTROLLED TYPE 2 DIABETES MELLITUS WITH BOTH EYES AFFECTED BY MILD NONPROLIFERATIVE RETINOPATHY AND MACULAR EDEMA, WITH LONG-TERM CURRENT USE OF INSULIN: ICD-10-CM

## 2020-11-16 DIAGNOSIS — E55.9 MILD VITAMIN D DEFICIENCY: ICD-10-CM

## 2020-11-16 DIAGNOSIS — Z79.4 CONTROLLED TYPE 2 DIABETES MELLITUS WITH BOTH EYES AFFECTED BY MILD NONPROLIFERATIVE RETINOPATHY AND MACULAR EDEMA, WITH LONG-TERM CURRENT USE OF INSULIN: ICD-10-CM

## 2020-11-16 DIAGNOSIS — R06.02 SHORTNESS OF BREATH: ICD-10-CM

## 2020-11-16 LAB
25(OH)D3+25(OH)D2 SERPL-MCNC: 30 NG/ML (ref 30–96)
ALBUMIN SERPL BCP-MCNC: 3 G/DL (ref 3.5–5.2)
ALP SERPL-CCNC: 84 U/L (ref 55–135)
ALT SERPL W/O P-5'-P-CCNC: 16 U/L (ref 10–44)
ANION GAP SERPL CALC-SCNC: 10 MMOL/L (ref 8–16)
AST SERPL-CCNC: 17 U/L (ref 10–40)
BASOPHILS # BLD AUTO: 0.09 K/UL (ref 0–0.2)
BASOPHILS NFR BLD: 1 % (ref 0–1.9)
BILIRUB SERPL-MCNC: 0.2 MG/DL (ref 0.1–1)
BNP SERPL-MCNC: 197 PG/ML (ref 0–99)
BUN SERPL-MCNC: 33 MG/DL (ref 8–23)
CALCIUM SERPL-MCNC: 9.3 MG/DL (ref 8.7–10.5)
CHLORIDE SERPL-SCNC: 102 MMOL/L (ref 95–110)
CHOLEST SERPL-MCNC: 126 MG/DL (ref 120–199)
CHOLEST/HDLC SERPL: 3.1 {RATIO} (ref 2–5)
CO2 SERPL-SCNC: 25 MMOL/L (ref 23–29)
CREAT SERPL-MCNC: 1.8 MG/DL (ref 0.5–1.4)
DIFFERENTIAL METHOD: ABNORMAL
EOSINOPHIL # BLD AUTO: 0.6 K/UL (ref 0–0.5)
EOSINOPHIL NFR BLD: 6.5 % (ref 0–8)
ERYTHROCYTE [DISTWIDTH] IN BLOOD BY AUTOMATED COUNT: 12.8 % (ref 11.5–14.5)
EST. GFR  (AFRICAN AMERICAN): 30 ML/MIN/1.73 M^2
EST. GFR  (NON AFRICAN AMERICAN): 26 ML/MIN/1.73 M^2
ESTIMATED AVG GLUCOSE: 151 MG/DL (ref 68–131)
GLUCOSE SERPL-MCNC: 70 MG/DL (ref 70–110)
HBA1C MFR BLD HPLC: 6.9 % (ref 4–5.6)
HCT VFR BLD AUTO: 30.9 % (ref 37–48.5)
HDLC SERPL-MCNC: 41 MG/DL (ref 40–75)
HDLC SERPL: 32.5 % (ref 20–50)
HGB BLD-MCNC: 9.4 G/DL (ref 12–16)
IMM GRANULOCYTES # BLD AUTO: 0.17 K/UL (ref 0–0.04)
IMM GRANULOCYTES NFR BLD AUTO: 1.9 % (ref 0–0.5)
LDLC SERPL CALC-MCNC: 56.8 MG/DL (ref 63–159)
LYMPHOCYTES # BLD AUTO: 2 K/UL (ref 1–4.8)
LYMPHOCYTES NFR BLD: 21.6 % (ref 18–48)
MCH RBC QN AUTO: 28.9 PG (ref 27–31)
MCHC RBC AUTO-ENTMCNC: 30.4 G/DL (ref 32–36)
MCV RBC AUTO: 95 FL (ref 82–98)
MONOCYTES # BLD AUTO: 0.9 K/UL (ref 0.3–1)
MONOCYTES NFR BLD: 10.3 % (ref 4–15)
NEUTROPHILS # BLD AUTO: 5.3 K/UL (ref 1.8–7.7)
NEUTROPHILS NFR BLD: 58.7 % (ref 38–73)
NONHDLC SERPL-MCNC: 85 MG/DL
NRBC BLD-RTO: 0 /100 WBC
PLATELET # BLD AUTO: 302 K/UL (ref 150–350)
PMV BLD AUTO: 11.5 FL (ref 9.2–12.9)
POTASSIUM SERPL-SCNC: 4.9 MMOL/L (ref 3.5–5.1)
PROT SERPL-MCNC: 6.9 G/DL (ref 6–8.4)
RBC # BLD AUTO: 3.25 M/UL (ref 4–5.4)
SODIUM SERPL-SCNC: 137 MMOL/L (ref 136–145)
TRIGL SERPL-MCNC: 141 MG/DL (ref 30–150)
TSH SERPL DL<=0.005 MIU/L-ACNC: 0.66 UIU/ML (ref 0.4–4)
WBC # BLD AUTO: 9.02 K/UL (ref 3.9–12.7)

## 2020-11-16 PROCEDURE — 36415 COLL VENOUS BLD VENIPUNCTURE: CPT | Mod: HCNC,PO

## 2020-11-16 PROCEDURE — 83036 HEMOGLOBIN GLYCOSYLATED A1C: CPT | Mod: HCNC

## 2020-11-16 PROCEDURE — 85025 COMPLETE CBC W/AUTO DIFF WBC: CPT | Mod: HCNC

## 2020-11-16 PROCEDURE — 83880 ASSAY OF NATRIURETIC PEPTIDE: CPT | Mod: HCNC

## 2020-11-16 PROCEDURE — 84443 ASSAY THYROID STIM HORMONE: CPT | Mod: HCNC

## 2020-11-16 PROCEDURE — 82306 VITAMIN D 25 HYDROXY: CPT | Mod: HCNC

## 2020-11-16 PROCEDURE — 80061 LIPID PANEL: CPT | Mod: HCNC

## 2020-11-16 PROCEDURE — 80053 COMPREHEN METABOLIC PANEL: CPT | Mod: HCNC

## 2020-11-17 ENCOUNTER — OFFICE VISIT (OUTPATIENT)
Dept: INTERNAL MEDICINE | Facility: CLINIC | Age: 80
End: 2020-11-17
Payer: MEDICARE

## 2020-11-17 ENCOUNTER — HOSPITAL ENCOUNTER (OUTPATIENT)
Dept: RADIOLOGY | Facility: HOSPITAL | Age: 80
Discharge: HOME | End: 2020-11-17
Attending: PHYSICIAN ASSISTANT
Payer: MEDICARE

## 2020-11-17 VITALS
DIASTOLIC BLOOD PRESSURE: 60 MMHG | BODY MASS INDEX: 30.83 KG/M2 | OXYGEN SATURATION: 96 % | HEIGHT: 61 IN | SYSTOLIC BLOOD PRESSURE: 140 MMHG | HEART RATE: 81 BPM

## 2020-11-17 DIAGNOSIS — J45.40 MODERATE PERSISTENT ASTHMA, UNSPECIFIED WHETHER COMPLICATED: ICD-10-CM

## 2020-11-17 DIAGNOSIS — J98.4 PNEUMONITIS: ICD-10-CM

## 2020-11-17 DIAGNOSIS — R06.02 SHORTNESS OF BREATH: ICD-10-CM

## 2020-11-17 DIAGNOSIS — J45.40 MODERATE PERSISTENT ASTHMA, UNSPECIFIED WHETHER COMPLICATED: Primary | ICD-10-CM

## 2020-11-17 PROCEDURE — 1126F PR PAIN SEVERITY QUANTIFIED, NO PAIN PRESENT: ICD-10-PCS | Mod: HCNC,S$GLB,, | Performed by: PHYSICIAN ASSISTANT

## 2020-11-17 PROCEDURE — 3288F PR FALLS RISK ASSESSMENT DOCUMENTED: ICD-10-PCS | Mod: HCNC,CPTII,S$GLB, | Performed by: PHYSICIAN ASSISTANT

## 2020-11-17 PROCEDURE — 3078F DIAST BP <80 MM HG: CPT | Mod: HCNC,CPTII,S$GLB, | Performed by: PHYSICIAN ASSISTANT

## 2020-11-17 PROCEDURE — 3077F PR MOST RECENT SYSTOLIC BLOOD PRESSURE >= 140 MM HG: ICD-10-PCS | Mod: HCNC,CPTII,S$GLB, | Performed by: PHYSICIAN ASSISTANT

## 2020-11-17 PROCEDURE — 1159F MED LIST DOCD IN RCRD: CPT | Mod: HCNC,S$GLB,, | Performed by: PHYSICIAN ASSISTANT

## 2020-11-17 PROCEDURE — 1101F PT FALLS ASSESS-DOCD LE1/YR: CPT | Mod: HCNC,CPTII,S$GLB, | Performed by: PHYSICIAN ASSISTANT

## 2020-11-17 PROCEDURE — 3077F SYST BP >= 140 MM HG: CPT | Mod: HCNC,CPTII,S$GLB, | Performed by: PHYSICIAN ASSISTANT

## 2020-11-17 PROCEDURE — 1126F AMNT PAIN NOTED NONE PRSNT: CPT | Mod: HCNC,S$GLB,, | Performed by: PHYSICIAN ASSISTANT

## 2020-11-17 PROCEDURE — 99214 PR OFFICE/OUTPT VISIT, EST, LEVL IV, 30-39 MIN: ICD-10-PCS | Mod: HCNC,S$GLB,, | Performed by: PHYSICIAN ASSISTANT

## 2020-11-17 PROCEDURE — 1101F PR PT FALLS ASSESS DOC 0-1 FALLS W/OUT INJ PAST YR: ICD-10-PCS | Mod: HCNC,CPTII,S$GLB, | Performed by: PHYSICIAN ASSISTANT

## 2020-11-17 PROCEDURE — 3288F FALL RISK ASSESSMENT DOCD: CPT | Mod: HCNC,CPTII,S$GLB, | Performed by: PHYSICIAN ASSISTANT

## 2020-11-17 PROCEDURE — 99999 PR PBB SHADOW E&M-EST. PATIENT-LVL III: ICD-10-PCS | Mod: PBBFAC,HCNC,, | Performed by: PHYSICIAN ASSISTANT

## 2020-11-17 PROCEDURE — 1159F PR MEDICATION LIST DOCUMENTED IN MEDICAL RECORD: ICD-10-PCS | Mod: HCNC,S$GLB,, | Performed by: PHYSICIAN ASSISTANT

## 2020-11-17 PROCEDURE — 99999 PR PBB SHADOW E&M-EST. PATIENT-LVL III: CPT | Mod: PBBFAC,HCNC,, | Performed by: PHYSICIAN ASSISTANT

## 2020-11-17 PROCEDURE — 3078F PR MOST RECENT DIASTOLIC BLOOD PRESSURE < 80 MM HG: ICD-10-PCS | Mod: HCNC,CPTII,S$GLB, | Performed by: PHYSICIAN ASSISTANT

## 2020-11-17 PROCEDURE — 71046 X-RAY EXAM CHEST 2 VIEWS: CPT | Mod: TC,HCNC

## 2020-11-17 PROCEDURE — 99214 OFFICE O/P EST MOD 30 MIN: CPT | Mod: HCNC,S$GLB,, | Performed by: PHYSICIAN ASSISTANT

## 2020-11-17 PROCEDURE — 71046 XR CHEST PA AND LATERAL: ICD-10-PCS | Mod: 26,HCNC,, | Performed by: RADIOLOGY

## 2020-11-17 PROCEDURE — 71046 X-RAY EXAM CHEST 2 VIEWS: CPT | Mod: 26,HCNC,, | Performed by: RADIOLOGY

## 2020-11-17 RX ORDER — PREDNISONE 20 MG/1
20 TABLET ORAL DAILY
Qty: 5 TABLET | Refills: 0 | Status: SHIPPED | OUTPATIENT
Start: 2020-11-17 | End: 2020-11-22

## 2020-11-17 RX ORDER — DOXYCYCLINE 100 MG/1
100 CAPSULE ORAL EVERY 12 HOURS
Qty: 20 CAPSULE | Refills: 0 | Status: ON HOLD | OUTPATIENT
Start: 2020-11-17 | End: 2020-12-09 | Stop reason: HOSPADM

## 2020-11-17 NOTE — PROGRESS NOTES
Subjective:       Patient ID: Myesha Quinones is a 80 y.o. female  has a past medical history of  Asthma/COPD, Basal cell carcinoma, Basal cell carcinoma,  Breast cancer, CAD  Cardiomyopathy, Cardiomyopathy, ischemic, CHF (congestive heart failure), CKD3, Controlled type 2 diabetes mellitus. Hyperlipidemia, Hypertension, Iron deficiency anemia, Left kidney mass, Meningioma, Osteoporosis, Pacemaker.      Chief Complaint: Follow-up    HPI       Established pt of Emelina Gaston MD (new to me)      Here for same day/urgent care appt.       Seen in the ED on 11/13(bilateral interstitial opacities), per chart review presenting symptoms were numbness/tingling of feet. Diagnosed with pneumonitis. COVID and Flu negative. EKG and Troponin WNL. Started on Augmentin. Lab were stable, no leukocytosis, BNP at baseline, anemia and Cr also at baseline.     Today she states she is not feeling any better. Feels the Augmentin isn't working. She states her symptoms are recurrent. Was on prednisone and doxy last month, her symptoms improved but cough, congestion wheezing have come back.  Using nebulizer at home about twice a day which helps. No fevers or cp.    Last seen by Pulm, 9/30 Her Advair was increased and started on Singulair.     Had repeat lab as ordered by Dr. Gaston this AM, remains stable     Glucose was 91 this AM.     Past Medical History:   Diagnosis Date    Acute hypoxemic respiratory failure 12/19/2019    Acute right-sided thoracic back pain 12/9/2019    Allergy     Asthma     Basal cell carcinoma     left forehead    Basal cell carcinoma     left nose    Basal cell carcinoma 05/20/2015    right nose    Basal cell carcinoma 12/22/2015    left lower post neck    Basal cell carcinoma 12/03/2019    left ant scalp     Breast cancer     CAD (coronary artery disease)     Cardiomyopathy     Cardiomyopathy, ischemic     Cataract     CHF (congestive heart failure)     Chronic kidney disease, stage 3, mod  "decreased GFR 2/14/2017    Colon polyp 2011    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, with long-term current use of insulin 2/22/2018    COPD (chronic obstructive pulmonary disease)     COPD exacerbation 4/8/2018    Defibrillator discharge     Diabetes mellitus     Diabetes mellitus type II     Diabetes with neurologic complications     Goiter     MNG    HX: breast cancer     Hyperlipidemia     Hypertension     Iron deficiency anemia 5/16/2017    Left kidney mass     Meningioma     Osteoporosis, postmenopausal     Pacemaker     Pneumonia 12/8/2019    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    Pseudomonas pneumonia     Skin cancer     s/p excision    Sleep apnea     CPAP    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/2015    mid forehead    Unspecified vitamin D deficiency     Ventricular tachycardia     Vitamin B12 deficiency     Vitamin D deficiency disease      Social History     Tobacco Use    Smoking status: Never Smoker    Smokeless tobacco: Never Used   Substance Use Topics    Alcohol use: No     Alcohol/week: 0.0 standard drinks    Drug use: No     Review of patient's allergies indicates:   Allergen Reactions    Iodine and iodide containing products Hives    Nifedipine      weakness         Review of Systems   Constitutional: Positive for fatigue (plans to start iron infusions next week). Negative for chills, diaphoresis and fever.   HENT: Negative for sore throat.    Respiratory: Positive for cough, shortness of breath and wheezing.    Cardiovascular: Positive for leg swelling (chronic stable). Negative for chest pain.   Gastrointestinal: Negative for abdominal pain, diarrhea and nausea.   Integumentary:  Negative for rash.   Neurological: Negative for dizziness, weakness, numbness and headaches.         Objective: BP (!) 140/60 Comment: manual recheck  Pulse 81   Ht 5' 1" (1.549 m)   LMP  (LMP Unknown)   " "SpO2 96%   BMI 30.83 kg/m²         Physical Exam  Constitutional:       General: She is not in acute distress.     Appearance: Normal appearance.      Comments: 96% on RA   HENT:      Head: Normocephalic and atraumatic.      Right Ear: Tympanic membrane, ear canal and external ear normal.      Left Ear: Tympanic membrane, ear canal and external ear normal.      Mouth/Throat:      Mouth: Mucous membranes are moist.      Pharynx: Oropharynx is clear.   Cardiovascular:      Rate and Rhythm: Normal rate and regular rhythm.   Pulmonary:      Effort: Pulmonary effort is normal. No respiratory distress.      Breath sounds: Wheezing present.      Comments: Speaking comfortably in complete sentences  Musculoskeletal:      Right lower leg: Edema (trace) present.      Left lower leg: Edema (trace) present.   Lymphadenopathy:      Cervical: No cervical adenopathy.   Skin:     General: Skin is warm and dry.   Neurological:      Mental Status: She is alert.             X-Ray Chest AP Portable  Narrative: EXAMINATION:  XR CHEST AP PORTABLE    CLINICAL HISTORY:  Provided history is "  Shortness of breath".    TECHNIQUE:  One view of the chest.    COMPARISON:  09/24/2020.    FINDINGS:  Cardiomediastinal silhouette is not significantly enlarged.  Left chest wall AICD is present with transvenous leads overlying the heart.  Atherosclerotic calcifications overlie the aortic arch.  There are patchy bilateral interstitial opacities which appear new when compared with the prior study.  No confluent area of consolidation.  Possible small bilateral pleural effusions.  No pneumothorax.  Impression: Patchy bilateral interstitial opacities suggestive of pneumonitis or edema.  Small bilateral pleural effusions.    Electronically signed by: Dheeraj Bravo MD  Date:    11/13/2020  Time:    03:11      X-Ray Chest PA And Lateral  Narrative: EXAMINATION:  XR CHEST PA AND LATERAL    CLINICAL HISTORY:  cough, sob; Moderate persistent asthma, " uncomplicated    TECHNIQUE:  PA and lateral views of the chest were performed.    COMPARISON:  11/13/2020 radiographs dating back 09/24/2020    FINDINGS:  Mild cardiomegaly, left subclavian pacemaker device stable.  Clearing of recent cardiac decompensation, pulmonary vascular congestion and perihilar lung base interstitial edema.  Remote peribronchial cuffing infrahilar and left lateral CP angle chronic pleural reaction anterior basal segment seen best on lateral view however stable.  Calcific bursitis changes included shoulders.  Impression: Clearing of interval cardiac decompensation, chest stable from 09/24/2020    Electronically signed by: Keron Guo MD  Date:    11/17/2020  Time:    14:33          Assessment:       1. Moderate persistent asthma, unspecified whether complicated    2. Shortness of breath    3. Pneumonitis        Plan:           Myesha was seen today for follow-up.    Diagnoses and all orders for this visit:    Moderate persistent asthma, unspecified whether complicated  -     Ambulatory referral/consult to Pulmonology; Future  -     doxycycline (VIBRAMYCIN) 100 MG Cap; Take 1 capsule (100 mg total) by mouth every 12 (twelve) hours.  -     predniSONE (DELTASONE) 20 MG tablet; Take 1 tablet (20 mg total) by mouth once daily. for 5 days  -     X-Ray Chest PA And Lateral; Future    Shortness of breath  -     Ambulatory referral/consult to Pulmonology; Future  -     doxycycline (VIBRAMYCIN) 100 MG Cap; Take 1 capsule (100 mg total) by mouth every 12 (twelve) hours.  -     predniSONE (DELTASONE) 20 MG tablet; Take 1 tablet (20 mg total) by mouth once daily. for 5 days  -     X-Ray Chest PA And Lateral; Future    Pneumonitis  -     Ambulatory referral/consult to Pulmonology; Future  -     doxycycline (VIBRAMYCIN) 100 MG Cap; Take 1 capsule (100 mg total) by mouth every 12 (twelve) hours.  -     predniSONE (DELTASONE) 20 MG tablet; Take 1 tablet (20 mg total) by mouth once daily. for 5  "days  -     X-Ray Chest PA And Lateral; Future        Start doxy  Short course of oral steroids  Continue inhalers as prescribed  Pulm follow up  Repeat CXR today shows "Clearing of interval cardiac decompensation, chest stable from 09/24/2020"  Strict ED precautions advised    Future Appointments   Date Time Provider Department Center   11/18/2020  9:30 AM Meaghan Zhu DNP [facility] PULMSVC [facility]   11/19/2020  9:00 AM [facility] US ENDOCRINOLOGY [facility] ENDCNUS [facility]   11/20/2020  9:20 AM Brittany Metcalf MD [facility] MARIAM [facility]   11/20/2020 11:30 AM INFUSION, CHAIR 7 [facility] AMB INF [facility]   11/30/2020  9:00 AM INFUSION, CHAIR 7 [facility] AMB INF [facility]   12/2/2020 11:00 AM Elidia Madison NP [facility] ENDODIA [facility]   12/7/2020  9:00 AM INFUSION, CHAIR 9 [facility] AMB INF [facility]   12/8/2020  8:00 AM EKG, APPT [facility] EKG [facility]   12/8/2020  8:20 AM COORDINATED DEVICE CHECK [facility] ARRHPRO [facility]   12/8/2020  9:00 AM Celeste Rogers NP [facility] ARRHYTH [facility]   12/9/2020  8:00 AM Emelina Gaston MD [facility] IM [facility] PCW   12/9/2020 10:00 AM Nargis Galvan, PA-C [facility] CARDIO [facility]   12/14/2020  9:00 AM INFUSION, CHAIR 9 [facility] AMB INF [facility]   12/21/2020  9:00 AM INFUSION, CHAIR 9 [facility] AMB INF [facility]   12/28/2020  9:00 AM Erika Nguyen MD [facility] DERM [facility]   12/28/2020 10:30 AM INFUSION, CHAIR 9 [facility] AMB INF [facility]   1/4/2021  9:00 AM INFUSION, CHAIR 9 [facility] AMB INF [facility]   1/11/2021  9:00 AM INFUSION, CHAIR 9 [facility] AMB INF [facility]   1/19/2021  9:00 AM INFUSION, CHAIR 9 [facility] AMB INF [facility]   1/25/2021  9:00 AM INFUSION, CHAIR 6 [facility] AMB INF [facility]   2/12/2021 10:00 AM HOME MONITOR DEVICE CHECK, MONICA Aggarwal"

## 2020-11-17 NOTE — PATIENT INSTRUCTIONS
START DOXYCYCLINE AND COURSE OF STEROIDS    FOLLOW UP WITH PULMONOLOGY.     IF SYMPTOMS WORSEN PLEASE CALL AND LET US KNOW

## 2020-11-18 ENCOUNTER — TELEPHONE (OUTPATIENT)
Dept: INTERNAL MEDICINE | Facility: CLINIC | Age: 80
End: 2020-11-18

## 2020-11-18 ENCOUNTER — OFFICE VISIT (OUTPATIENT)
Dept: PULMONOLOGY | Facility: CLINIC | Age: 80
End: 2020-11-18
Payer: MEDICARE

## 2020-11-18 VITALS
WEIGHT: 164.69 LBS | DIASTOLIC BLOOD PRESSURE: 72 MMHG | BODY MASS INDEX: 31.09 KG/M2 | SYSTOLIC BLOOD PRESSURE: 150 MMHG | OXYGEN SATURATION: 92 % | HEART RATE: 101 BPM | HEIGHT: 61 IN

## 2020-11-18 DIAGNOSIS — J98.4 PNEUMONITIS: ICD-10-CM

## 2020-11-18 DIAGNOSIS — J45.51 SEVERE PERSISTENT CHRONIC ASTHMA WITH ACUTE EXACERBATION: ICD-10-CM

## 2020-11-18 DIAGNOSIS — J30.2 SEASONAL ALLERGIES: ICD-10-CM

## 2020-11-18 DIAGNOSIS — R06.02 SHORTNESS OF BREATH: ICD-10-CM

## 2020-11-18 DIAGNOSIS — J45.40 MODERATE PERSISTENT ASTHMA, UNSPECIFIED WHETHER COMPLICATED: ICD-10-CM

## 2020-11-18 PROCEDURE — 3078F PR MOST RECENT DIASTOLIC BLOOD PRESSURE < 80 MM HG: ICD-10-PCS | Mod: HCNC,CPTII,S$GLB, | Performed by: NURSE PRACTITIONER

## 2020-11-18 PROCEDURE — 1126F PR PAIN SEVERITY QUANTIFIED, NO PAIN PRESENT: ICD-10-PCS | Mod: HCNC,S$GLB,, | Performed by: NURSE PRACTITIONER

## 2020-11-18 PROCEDURE — 3077F SYST BP >= 140 MM HG: CPT | Mod: HCNC,CPTII,S$GLB, | Performed by: NURSE PRACTITIONER

## 2020-11-18 PROCEDURE — 99214 OFFICE O/P EST MOD 30 MIN: CPT | Mod: HCNC,S$GLB,, | Performed by: NURSE PRACTITIONER

## 2020-11-18 PROCEDURE — 1159F MED LIST DOCD IN RCRD: CPT | Mod: HCNC,S$GLB,, | Performed by: NURSE PRACTITIONER

## 2020-11-18 PROCEDURE — 99214 PR OFFICE/OUTPT VISIT, EST, LEVL IV, 30-39 MIN: ICD-10-PCS | Mod: HCNC,S$GLB,, | Performed by: NURSE PRACTITIONER

## 2020-11-18 PROCEDURE — 3077F PR MOST RECENT SYSTOLIC BLOOD PRESSURE >= 140 MM HG: ICD-10-PCS | Mod: HCNC,CPTII,S$GLB, | Performed by: NURSE PRACTITIONER

## 2020-11-18 PROCEDURE — 99999 PR PBB SHADOW E&M-EST. PATIENT-LVL V: CPT | Mod: PBBFAC,HCNC,, | Performed by: NURSE PRACTITIONER

## 2020-11-18 PROCEDURE — 1126F AMNT PAIN NOTED NONE PRSNT: CPT | Mod: HCNC,S$GLB,, | Performed by: NURSE PRACTITIONER

## 2020-11-18 PROCEDURE — 1101F PR PT FALLS ASSESS DOC 0-1 FALLS W/OUT INJ PAST YR: ICD-10-PCS | Mod: HCNC,CPTII,S$GLB, | Performed by: NURSE PRACTITIONER

## 2020-11-18 PROCEDURE — 3288F FALL RISK ASSESSMENT DOCD: CPT | Mod: HCNC,CPTII,S$GLB, | Performed by: NURSE PRACTITIONER

## 2020-11-18 PROCEDURE — 3078F DIAST BP <80 MM HG: CPT | Mod: HCNC,CPTII,S$GLB, | Performed by: NURSE PRACTITIONER

## 2020-11-18 PROCEDURE — 1101F PT FALLS ASSESS-DOCD LE1/YR: CPT | Mod: HCNC,CPTII,S$GLB, | Performed by: NURSE PRACTITIONER

## 2020-11-18 PROCEDURE — 1159F PR MEDICATION LIST DOCUMENTED IN MEDICAL RECORD: ICD-10-PCS | Mod: HCNC,S$GLB,, | Performed by: NURSE PRACTITIONER

## 2020-11-18 PROCEDURE — 3288F PR FALLS RISK ASSESSMENT DOCUMENTED: ICD-10-PCS | Mod: HCNC,CPTII,S$GLB, | Performed by: NURSE PRACTITIONER

## 2020-11-18 PROCEDURE — 99999 PR PBB SHADOW E&M-EST. PATIENT-LVL V: ICD-10-PCS | Mod: PBBFAC,HCNC,, | Performed by: NURSE PRACTITIONER

## 2020-11-18 NOTE — LETTER
November 24, 2020      Sigrid Solo PA-C  [address]  [facility] [address]           [facility] [address]  [address]  [facility] [address]  Phone: [phone]          Patient: Myesha Quinones   MR Number: [id number]   YOB: 1940   Date of Visit: 11/18/2020       Dear Sigrid Solo:    Thank you for referring Myesha Quinones to me for evaluation. Attached you will find relevant portions of my assessment and plan of care.    If you have questions, please do not hesitate to call me. I look forward to following Myesha Quinones along with you.    Sincerely,    Meaghan Zhu, DNP    Enclosure  CC:  No Recipients    If you would like to receive this communication electronically, please contact [email or web address] or [phone] to request more information on [employer] Link access.    For providers and/or their staff who would like to refer a patient to [facility], please contact us through our one-stop-shop provider referral line, [facility] , at [phone].    If you feel you have received this communication in error or would no longer like to receive these types of communications, please e-mail [email or web address]

## 2020-11-18 NOTE — TELEPHONE ENCOUNTER
Spoke with pt . Results given .Labs are stable. Improved from ER. Your kidney function will be followed every 3 months. Kidney function is the same.

## 2020-11-18 NOTE — PROGRESS NOTES
Subjective:       Patient ID: Myesha Quinones is a 80 y.o. female.    Chief Complaint: Obstructive asthma  HPI:   Myesha Quinones is a 80 y.o. female who presents with evaluation for asthma. Has been on prednisone and doxi x 4 weeks.  Was diagnosed with PNA.  Went to ED, Then went and saw another MD and CXR slightly better. Had been put on amoxicilin by ER.  Now back on doxi and pred again.   Had PNA last year around this time and feels like all year she has had one thing after another- gets better and then worse.  When she coughs up mucus it is a little green  Abx start to help, fels better today than she has been   No exacerbating factors  Can't walk very far without getting out of breath  Feet are swelling a little more than before  Has been trying to use the nebs TID, they do help but sometimes she doesn't;t have time. We had discussed  Adding a LAMA into her regimen- she was very hesitant as she is on so many medications.  Mani had discussed potential biologic therapy    Review of Systems   Constitutional: Negative for chills, activity change, fatigue and night sweats.   HENT: Positive for postnasal drip, rhinorrhea and congestion. Negative for trouble swallowing.    Eyes: Negative for itching.   Respiratory: Positive for cough, chest tightness, wheezing and dyspnea on extertion. Negative for hemoptysis, sputum production, choking, shortness of breath and use of rescue inhaler.    Cardiovascular: Negative for chest pain and palpitations.   Genitourinary: Negative for difficulty urinating.   Endocrine: Negative for cold intolerance and heat intolerance.    Musculoskeletal: Negative for arthralgias.   Skin: Negative for rash.   Gastrointestinal: Negative for nausea, vomiting and acid reflux.   Neurological: Negative for dizziness and light-headedness.   Hematological: Negative for adenopathy.   Psychiatric/Behavioral: Positive for sleep disturbance.         Social History     Tobacco Use    Smoking  status: Never Smoker    Smokeless tobacco: Never Used   Substance Use Topics    Alcohol use: No     Alcohol/week: 0.0 standard drinks       Review of patient's allergies indicates:   Allergen Reactions    Iodine and iodide containing products Hives    Nifedipine      weakness     Past Medical History:   Diagnosis Date    Acute hypoxemic respiratory failure 12/19/2019    Acute right-sided thoracic back pain 12/9/2019    Allergy     Asthma     Basal cell carcinoma     left forehead    Basal cell carcinoma     left nose    Basal cell carcinoma 05/20/2015    right nose    Basal cell carcinoma 12/22/2015    left lower post neck    Basal cell carcinoma 12/03/2019    left ant scalp     Breast cancer     CAD (coronary artery disease)     Cardiomyopathy     Cardiomyopathy, ischemic     Cataract     CHF (congestive heart failure)     Chronic kidney disease, stage 3, mod decreased GFR 2/14/2017    Colon polyp 2011    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, with long-term current use of insulin 2/22/2018    COPD (chronic obstructive pulmonary disease)     COPD exacerbation 4/8/2018    Defibrillator discharge     Diabetes mellitus     Diabetes mellitus type II     Diabetes with neurologic complications     Goiter     MNG    HX: breast cancer     Hyperlipidemia     Hypertension     Iron deficiency anemia 5/16/2017    Left kidney mass     Meningioma     Osteoporosis, postmenopausal     Pacemaker     Pneumonia 12/8/2019    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    Pseudomonas pneumonia     Skin cancer     s/p excision    Sleep apnea     CPAP    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/2015    mid forehead    Unspecified vitamin D deficiency     Ventricular tachycardia     Vitamin B12 deficiency     Vitamin D deficiency disease      Past Surgical History:   Procedure Laterality Date    BASAL CELL CARCINOMA EXCISION       posterior neck and nose    BREAST BIOPSY      BREAST CYST EXCISION Left     BREAST SURGERY      CARDIAC DEFIBRILLATOR PLACEMENT      x 2    CATARACT EXTRACTION W/  INTRAOCULAR LENS IMPLANT Bilateral     CHOLECYSTECTOMY      COLONOSCOPY N/A 11/5/2019    Procedure: COLONOSCOPY;  Surgeon: Boaz Botello MD;  Location: [facility] ENDO (4TH FLR);  Service: Endoscopy;  Laterality: N/A;  AICD - [employer] - sm    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    FRACTURE SURGERY      left elbow and wrist as a child    HYSTERECTOMY      MASTECTOMY Right     REPLACEMENT OF IMPLANTABLE CARDIOVERTER-DEFIBRILLATOR (ICD) GENERATOR N/A 12/17/2018    Procedure: REPLACEMENT, ICD GENERATOR;  Surgeon: Jan Mckeon MD;  Location: [facility] EP LAB;  Service: Cardiology;  Laterality: N/A;  DONNA, CRT-D gen change, MDT, MAC, SK, 3 Prep    REVISION OF SKIN POCKET FOR CARDIOVERTER-DEFIBRILLATOR  12/17/2018    Procedure: Revision, Skin Pocket, For Cardioverter-Defibrillator;  Surgeon: Jan Mckeon MD;  Location: [facility] EP LAB;  Service: Cardiology;;    SQUAMOUS CELL CARCINOMA EXCISION      remved from forehead    TONSILLECTOMY       Current Outpatient Medications on File Prior to Visit   Medication Sig    acetaminophen (TYLENOL) 500 MG tablet Take 1,000 mg by mouth daily as needed for Pain.    albuterol (VENTOLIN HFA) 90 mcg/actuation inhaler INHALE 2 PUFFS INTO THE LUNGS EVERY 6 (SIX) HOURS AS NEEDED FOR WHEEZING. RESCUE    albuterol-ipratropium (DUO-NEB) 2.5 mg-0.5 mg/3 mL nebulizer solution TAKE 1 VIAL BY NEBULIZATION EVERY 4 (FOUR) HOURS. RESCUE    amoxicillin-clavulanate 875-125mg (AUGMENTIN) 875-125 mg per tablet Take 1 tablet by mouth 2 (two) times daily.    aspirin (ENTERIC COATED ASPIRIN) 81 MG EC tablet Take 1 tablet by mouth once daily.     benzonatate (TESSALON PERLES) 100 MG capsule Take 2 capsules (200 mg total) by mouth 3 (three) times daily as needed for Cough.    blood sugar diagnostic (ACCU-CHEK HECTOR) Strp Uses  Accu-Check Angelica meter to test BG 4x/day    carvediloL (COREG) 25 MG tablet TAKE 2 TABLETS (50 MG TOTAL) BY MOUTH 2 (TWO) TIMES DAILY.    chlorthalidone (HYGROTEN) 25 MG Tab Take 0.5 tablets (12.5 mg total) by mouth once daily.    cholecalciferol, vitamin D3, (VITAMIN D3) 2,000 unit Tab Take 1 tablet by mouth once daily.     cloNIDine 0.1 mg/24 hr td ptwk (CATAPRES) 0.1 mg/24 hr PLACE 1 PATCH ONTO THE SKIN EVERY 7 DAYS.    CYANOCOBALAMIN, VITAMIN B-12, (B-12 DOTS ORAL) Take 1 tablet by mouth once daily.    diltiaZEM (CARDIZEM CD) 180 MG 24 hr capsule Take 1 capsule (180 mg total) by mouth once daily.    doxycycline (VIBRAMYCIN) 100 MG Cap Take 1 capsule (100 mg total) by mouth every 12 (twelve) hours.    fluticasone propionate (FLONASE) 50 mcg/actuation nasal spray 2 sprays (100 mcg total) by Each Nostril route once daily.    fluticasone-salmeterol diskus inhaler 500-50 mcg Inhale 1 puff into the lungs 2 (two) times daily. Controller    hydrALAZINE (APRESOLINE) 100 MG tablet Take 1 tablet (100 mg total) by mouth 3 (three) times daily.    irbesartan (AVAPRO) 300 MG tablet Take 1 tablet (300 mg total) by mouth every evening.    lancets (ACCU-CHEK SOFTCLIX LANCETS) Misc Uses Accu-Chek Angelica meter to test BG 4x/day (Patient taking differently: Uses Accu-Chek Angelica meter to test BG 1x/day)    levocetirizine (XYZAL) 5 MG tablet Take 1 tablet (5 mg total) by mouth every evening.    linaGLIPtin (TRADJENTA) 5 mg Tab tablet Take 1 tablet (5 mg total) by mouth once daily.    magnesium oxide (MAG-OX) 400 mg tablet Take 1 tablet (400 mg total) by mouth once daily.    metFORMIN (GLUCOPHAGE) 500 MG tablet Take 1 tablet (500 mg total) by mouth 2 (two) times daily with meals.    nitroGLYCERIN (NITROSTAT) 0.4 MG SL tablet Place 0.4 mg under the tongue every 5 (five) minutes as needed for Chest pain.     omega-3 fatty acids (FISH OIL) 500 mg Cap Take 1 capsule by mouth Twice daily.    pen needle, diabetic (BD  "ULTRA-FINE MINI PEN NEEDLE) 31 gauge x 3/16" Ndle USE WITH LANTUS AT BEDTIME    pravastatin (PRAVACHOL) 40 MG tablet TAKE 1 TABLET BY MOUTH EVERY DAY    predniSONE (DELTASONE) 20 MG tablet Take 1 tablet (20 mg total) by mouth once daily. for 5 days    insulin (LANTUS SOLOSTAR U-100 INSULIN) glargine 100 units/mL (3mL) SubQ pen Inject 34 Units into the skin every evening. Cancel the 30 unit prescription     Current Facility-Administered Medications on File Prior to Visit   Medication    0.9%  NaCl infusion    fluorescein 500 mg/5 mL (10 %) injection 500 mg       Objective:      Vitals:    11/18/20 0927   BP: (!) 150/72   Pulse: 101     Physical Exam   Constitutional: She is oriented to person, place, and time. She appears well-developed and well-nourished. No distress.   HENT:   Head: Normocephalic.   Neck: Normal range of motion. Neck supple.   Cardiovascular: Normal rate and regular rhythm.   No murmur heard.  Pulmonary/Chest: Normal expansion, symmetric chest wall expansion and effort normal. No respiratory distress. She has decreased breath sounds. She has wheezes. She has no rhonchi. She has no rales.   Abdominal: Soft. She exhibits no distension. There is no hepatosplenomegaly. There is no abdominal tenderness.   Musculoskeletal: Normal range of motion.         General: No edema.   Lymphadenopathy:     She has no cervical adenopathy.   Neurological: She is alert and oriented to person, place, and time. Gait normal.   Skin: Skin is warm and dry. No cyanosis. Nails show no clubbing.   Psychiatric: She has a normal mood and affect.   Vitals reviewed.    Personal Diagnostic Review    PFTs personally reviewed and discussed with patient  Imaging personally reviewed with patient CXR 11/17/20, 11/13/20        Assessment:     Problem List Items Addressed This Visit        Pulmonary    Asthma, chronic    Overview     Continue Advair to 500/50 BID. Ventolin for rescue and Duo Nebs PRN  Add in Singulair  Recommend " LAMA- patient prefers not to add another medication in at this time.  Suspect we will keep battling symptoms without optimized therapy  She responds well to steroids but would prefer to limit their use if possible as she also has DM and is insulin dependant         Current Assessment & Plan     Monitor.         Moderate asthma       Other    Seasonal allergies    Overview     Continue Xyzal and flonase  Add in Singulair  Eos noted on recent CBC- a biologic may be helpful but we are not optimized from an inhaler standpoint.           Current Assessment & Plan     Recommend saline rinses.  Suspect this drives her symptoms           Other Visit Diagnoses     Shortness of breath        Pneumonitis

## 2020-11-18 NOTE — TELEPHONE ENCOUNTER
Please tell that her labs are stable and improved from the ER. We will follow her kidney function every 3 months. Her kidney function is the same.

## 2020-11-18 NOTE — PATIENT INSTRUCTIONS
I will discuss your case    Keep taking your Wixela twice daily    Use your breathing treatments every 4-6 hours    Try mucinex    Keep taking that prednisone    I will be in touch

## 2020-11-19 ENCOUNTER — HOSPITAL ENCOUNTER (OUTPATIENT)
Dept: ENDOCRINOLOGY | Facility: CLINIC | Age: 80
Discharge: HOME | End: 2020-11-19
Attending: NURSE PRACTITIONER
Payer: MEDICARE

## 2020-11-19 DIAGNOSIS — E04.2 NONTOXIC MULTINODULAR GOITER: ICD-10-CM

## 2020-11-19 PROCEDURE — 76536 US EXAM OF HEAD AND NECK: CPT | Mod: HCNC,S$GLB,, | Performed by: INTERNAL MEDICINE

## 2020-11-19 PROCEDURE — 76536 US SOFT TISSUE HEAD NECK THYROID: ICD-10-PCS | Mod: HCNC,S$GLB,, | Performed by: INTERNAL MEDICINE

## 2020-11-21 NOTE — TELEPHONE ENCOUNTER
Please tell her no UTI.   You can access the FollowMyHealth Patient Portal offered by [facility] by registering at the following website: [email or web address]. By joining [facility]’s FollowMyHealth portal, you will also be able to view your health information using other applications (apps) compatible with our system.

## 2020-11-23 RX ORDER — MONTELUKAST SODIUM 10 MG/1
10 TABLET ORAL NIGHTLY
Qty: 30 TABLET | Refills: 11 | Status: SHIPPED | OUTPATIENT
Start: 2020-11-23 | End: 2020-12-23

## 2020-11-24 ENCOUNTER — PATIENT MESSAGE (OUTPATIENT)
Dept: PULMONOLOGY | Facility: CLINIC | Age: 80
End: 2020-11-24

## 2020-11-30 ENCOUNTER — PATIENT OUTREACH (OUTPATIENT)
Dept: OTHER | Facility: OTHER | Age: 80
End: 2020-11-30

## 2020-11-30 ENCOUNTER — TELEPHONE (OUTPATIENT)
Dept: INTERNAL MEDICINE | Facility: CLINIC | Age: 80
End: 2020-11-30

## 2020-11-30 DIAGNOSIS — Z20.822 CLOSE EXPOSURE TO COVID-19 VIRUS: Primary | ICD-10-CM

## 2020-11-30 NOTE — PROGRESS NOTES
Digital Medicine: Clinician Follow-Up    Called patient to review HDMP readings.    The history is provided by the patient.   Follow-up reason(s): routine follow up.     Hypertension  Patient needs assistance troubleshooting: patient reminder needed.    Additional Follow-up details: Her  is currently in the hospital with COVID. He is doing much better but she has been busy and has not been checking readings. Patient states she will resume monitoring today.      Last 5 Patient Entered Readings                                      Current 30 Day Average: 153/67     Recent Readings 11/18/2020 11/15/2020 11/11/2020 11/10/2020 11/7/2020    SBP (mmHg) 157 134 145 181 149    DBP (mmHg) 68 60 59 77 69    Pulse 75 75 76 77 77          Depression Screening  Did not address depression screening.    Sleep Apnea Screening    Did not address sleep apnea screening.     Medication Affordability Screening  Did not address medication affordability screening.     Medication Adherence-Medication Adherence not addressed.          ASSESSMENT(S)  Patients BP average is 153/67 mmHg, which is above goal. Patient's BP goal is less than or equal to 140/90.     Hypertension Plan  Additional monitoring needed.  Continue current therapy.  Provided patient education.  Consider increasing diltiazem as needed for additional blood pressure lowering at follow-up.     Addressed patient questions and patient has my contact information if needed prior to next outreach. Patient verbalizes understanding.             There are no preventive care reminders to display for this patient.  There are no preventive care reminders to display for this patient.      Hypertension Medications             carvediloL (COREG) 25 MG tablet TAKE 2 TABLETS (50 MG TOTAL) BY MOUTH 2 (TWO) TIMES DAILY.    chlorthalidone (HYGROTEN) 25 MG Tab Take 0.5 tablets (12.5 mg total) by mouth once daily.    cloNIDine 0.1 mg/24 hr td ptwk (CATAPRES) 0.1 mg/24 hr PLACE 1 PATCH ONTO  THE SKIN EVERY 7 DAYS.    diltiaZEM (CARDIZEM CD) 180 MG 24 hr capsule Take 1 capsule (180 mg total) by mouth once daily.    hydrALAZINE (APRESOLINE) 100 MG tablet Take 1 tablet (100 mg total) by mouth 3 (three) times daily.    irbesartan (AVAPRO) 300 MG tablet Take 1 tablet (300 mg total) by mouth every evening.    nitroGLYCERIN (NITROSTAT) 0.4 MG SL tablet Place 0.4 mg under the tongue every 5 (five) minutes as needed for Chest pain.

## 2020-12-01 ENCOUNTER — TELEPHONE (OUTPATIENT)
Dept: ENDOCRINOLOGY | Facility: CLINIC | Age: 80
End: 2020-12-01

## 2020-12-01 ENCOUNTER — LAB VISIT (OUTPATIENT)
Dept: INTERNAL MEDICINE | Facility: CLINIC | Age: 80
End: 2020-12-01
Payer: MEDICARE

## 2020-12-01 DIAGNOSIS — Z20.822 CLOSE EXPOSURE TO COVID-19 VIRUS: ICD-10-CM

## 2020-12-01 PROCEDURE — U0003 INFECTIOUS AGENT DETECTION BY NUCLEIC ACID (DNA OR RNA); SEVERE ACUTE RESPIRATORY SYNDROME CORONAVIRUS 2 (SARS-COV-2) (CORONAVIRUS DISEASE [COVID-19]), AMPLIFIED PROBE TECHNIQUE, MAKING USE OF HIGH THROUGHPUT TECHNOLOGIES AS DESCRIBED BY CMS-2020-01-R: HCPCS | Mod: HCNC

## 2020-12-01 NOTE — TELEPHONE ENCOUNTER
----- Message from Cyndi Sánchez sent at 11/30/2020  4:20 PM CST -----  Contact: [phone]  Pt  is in hospital with covid-19 and daughter also has covid she would like to know if she should be tested for covid. She is not experiencing any symptoms

## 2020-12-02 DIAGNOSIS — E11.42 DIABETIC POLYNEUROPATHY ASSOCIATED WITH TYPE 2 DIABETES MELLITUS: ICD-10-CM

## 2020-12-02 RX ORDER — PEN NEEDLE, DIABETIC 30 GX3/16"
NEEDLE, DISPOSABLE MISCELLANEOUS
Qty: 400 EACH | Refills: 3 | Status: SHIPPED | OUTPATIENT
Start: 2020-12-02 | End: 2020-12-14 | Stop reason: SDUPTHER

## 2020-12-02 NOTE — TELEPHONE ENCOUNTER
Called pt and re schedule apt because her  recently tested positive .  Re schedule pt in March . Pt want to  know her ultrasound result and refill for her strips and needles.     Please let pt know her Ultrasound result.       Thank you

## 2020-12-03 ENCOUNTER — TELEPHONE (OUTPATIENT)
Dept: INTERNAL MEDICINE | Facility: CLINIC | Age: 80
End: 2020-12-03

## 2020-12-03 ENCOUNTER — TELEPHONE (OUTPATIENT)
Dept: NEPHROLOGY | Facility: CLINIC | Age: 80
End: 2020-12-03

## 2020-12-03 ENCOUNTER — TELEPHONE (OUTPATIENT)
Dept: CARDIOLOGY | Facility: HOSPITAL | Age: 80
End: 2020-12-03

## 2020-12-03 DIAGNOSIS — U07.1 COVID-19 VIRUS DETECTED: ICD-10-CM

## 2020-12-03 DIAGNOSIS — U07.1 COVID-19 VIRUS DETECTED: Primary | ICD-10-CM

## 2020-12-03 LAB — SARS-COV-2 RNA RESP QL NAA+PROBE: DETECTED

## 2020-12-03 RX ORDER — AZITHROMYCIN 250 MG/1
TABLET, FILM COATED ORAL
Qty: 6 TABLET | Refills: 0 | Status: ON HOLD | OUTPATIENT
Start: 2020-12-03 | End: 2020-12-09 | Stop reason: HOSPADM

## 2020-12-03 NOTE — TELEPHONE ENCOUNTER
Spoke with patient, she informed me that she tested positive for Covid and wanted to cancel her device in clinic check.  I asked if she would like to call us back when she is cleared from Covid to reschedule the device check and doctor/NP visit so it can still be coordinated on the same date. Patient stated she would like that.  Patient will call back to reschedule these appointments at a later date.   ----- Message from Clarice Yoder MA sent at 12/3/2020  3:47 PM CST -----  Regarding: FW: reschedule infusion  Contact: self    ----- Message -----  From: Aurelio Hartley  Sent: 12/3/2020   3:43 PM CST  To: Mike [facility]oya Staff  Subject: reschedule infusion                              Tested positve 12/01 for Covid requesting to reschedule Device check in  Pt can be reached at [phone] (home)

## 2020-12-03 NOTE — TELEPHONE ENCOUNTER
Called pt and gave number to infusion to  reschedule    ----- Message from Aurelio Hartley sent at 12/3/2020  3:40 PM CST -----  Regarding: Need to reschedule  Contact: self  Tested positve 12/01 for Covid requesting to reschedule infusion   Pt can be reached at [phone] (home)

## 2020-12-03 NOTE — TELEPHONE ENCOUNTER
Patient informed she is covid positive,  Hi Risk  She says she feels fine  Pulse ox :96  Temp:96.5    She is to check vitals

## 2020-12-04 ENCOUNTER — PATIENT OUTREACH (OUTPATIENT)
Dept: OTHER | Facility: OTHER | Age: 80
End: 2020-12-04

## 2020-12-06 ENCOUNTER — HOSPITAL ENCOUNTER (INPATIENT)
Facility: HOSPITAL | Age: 80
LOS: 6 days | Discharge: HOME HEALTH | DRG: 177 | End: 2020-12-12
Attending: EMERGENCY MEDICINE | Admitting: INTERNAL MEDICINE
Payer: MEDICARE

## 2020-12-06 DIAGNOSIS — J96.01 ACUTE HYPOXEMIC RESPIRATORY FAILURE: ICD-10-CM

## 2020-12-06 DIAGNOSIS — U07.1 COVID-19: Primary | ICD-10-CM

## 2020-12-06 DIAGNOSIS — Z20.822 SUSPECTED COVID-19 VIRUS INFECTION: ICD-10-CM

## 2020-12-06 DIAGNOSIS — R06.02 SOB (SHORTNESS OF BREATH): ICD-10-CM

## 2020-12-06 DIAGNOSIS — U07.1 PNEUMONIA DUE TO COVID-19 VIRUS: ICD-10-CM

## 2020-12-06 DIAGNOSIS — J12.82 PNEUMONIA DUE TO COVID-19 VIRUS: ICD-10-CM

## 2020-12-06 DIAGNOSIS — R05.9 COUGH: ICD-10-CM

## 2020-12-06 LAB
ALBUMIN SERPL BCP-MCNC: 2.8 G/DL (ref 3.5–5.2)
ALP SERPL-CCNC: 86 U/L (ref 55–135)
ALT SERPL W/O P-5'-P-CCNC: 16 U/L (ref 10–44)
ANION GAP SERPL CALC-SCNC: 10 MMOL/L (ref 8–16)
AST SERPL-CCNC: 22 U/L (ref 10–40)
BASOPHILS # BLD AUTO: 0.03 K/UL (ref 0–0.2)
BASOPHILS NFR BLD: 0.3 % (ref 0–1.9)
BILIRUB SERPL-MCNC: 0.2 MG/DL (ref 0.1–1)
BUN SERPL-MCNC: 26 MG/DL (ref 8–23)
CALCIUM SERPL-MCNC: 8.7 MG/DL (ref 8.7–10.5)
CHLORIDE SERPL-SCNC: 100 MMOL/L (ref 95–110)
CK SERPL-CCNC: 52 U/L (ref 20–180)
CO2 SERPL-SCNC: 21 MMOL/L (ref 23–29)
CREAT SERPL-MCNC: 1.8 MG/DL (ref 0.5–1.4)
CRP SERPL-MCNC: 41 MG/L (ref 0–8.2)
D DIMER PPP IA.FEU-MCNC: 1.91 MG/L FEU
DIFFERENTIAL METHOD: ABNORMAL
EOSINOPHIL # BLD AUTO: 0 K/UL (ref 0–0.5)
EOSINOPHIL NFR BLD: 0.1 % (ref 0–8)
ERYTHROCYTE [DISTWIDTH] IN BLOOD BY AUTOMATED COUNT: 13.4 % (ref 11.5–14.5)
EST. GFR  (AFRICAN AMERICAN): 30 ML/MIN/1.73 M^2
EST. GFR  (NON AFRICAN AMERICAN): 26 ML/MIN/1.73 M^2
FERRITIN SERPL-MCNC: 53 NG/ML (ref 20–300)
GLUCOSE SERPL-MCNC: 188 MG/DL (ref 70–110)
HCT VFR BLD AUTO: 31.3 % (ref 37–48.5)
HGB BLD-MCNC: 9.8 G/DL (ref 12–16)
IMM GRANULOCYTES # BLD AUTO: 0.1 K/UL (ref 0–0.04)
IMM GRANULOCYTES NFR BLD AUTO: 1 % (ref 0–0.5)
LACTATE SERPL-SCNC: 2 MMOL/L (ref 0.5–2.2)
LDH SERPL L TO P-CCNC: 181 U/L (ref 110–260)
LYMPHOCYTES # BLD AUTO: 1.9 K/UL (ref 1–4.8)
LYMPHOCYTES NFR BLD: 19.1 % (ref 18–48)
MCH RBC QN AUTO: 28.6 PG (ref 27–31)
MCHC RBC AUTO-ENTMCNC: 31.3 G/DL (ref 32–36)
MCV RBC AUTO: 91 FL (ref 82–98)
MONOCYTES # BLD AUTO: 0.9 K/UL (ref 0.3–1)
MONOCYTES NFR BLD: 8.8 % (ref 4–15)
NEUTROPHILS # BLD AUTO: 7.2 K/UL (ref 1.8–7.7)
NEUTROPHILS NFR BLD: 70.7 % (ref 38–73)
NRBC BLD-RTO: 0 /100 WBC
PLATELET # BLD AUTO: 216 K/UL (ref 150–350)
PMV BLD AUTO: 10.7 FL (ref 9.2–12.9)
POCT GLUCOSE: 130 MG/DL (ref 70–110)
POTASSIUM SERPL-SCNC: 4.3 MMOL/L (ref 3.5–5.1)
PROT SERPL-MCNC: 6.9 G/DL (ref 6–8.4)
RBC # BLD AUTO: 3.43 M/UL (ref 4–5.4)
SODIUM SERPL-SCNC: 131 MMOL/L (ref 136–145)
TROPONIN I SERPL DL<=0.01 NG/ML-MCNC: 0.02 NG/ML (ref 0–0.03)
WBC # BLD AUTO: 10.13 K/UL (ref 3.9–12.7)

## 2020-12-06 PROCEDURE — 93005 ELECTROCARDIOGRAM TRACING: CPT | Mod: HCNC

## 2020-12-06 PROCEDURE — 86140 C-REACTIVE PROTEIN: CPT | Mod: HCNC

## 2020-12-06 PROCEDURE — 85379 FIBRIN DEGRADATION QUANT: CPT | Mod: HCNC

## 2020-12-06 PROCEDURE — 12000002 HC ACUTE/MED SURGE SEMI-PRIVATE ROOM: Mod: HCNC

## 2020-12-06 PROCEDURE — 83615 LACTATE (LD) (LDH) ENZYME: CPT | Mod: HCNC

## 2020-12-06 PROCEDURE — 84484 ASSAY OF TROPONIN QUANT: CPT | Mod: HCNC

## 2020-12-06 PROCEDURE — 11000001 HC ACUTE MED/SURG PRIVATE ROOM: Mod: HCNC

## 2020-12-06 PROCEDURE — 93010 ELECTROCARDIOGRAM REPORT: CPT | Mod: HCNC,,, | Performed by: INTERNAL MEDICINE

## 2020-12-06 PROCEDURE — 82728 ASSAY OF FERRITIN: CPT | Mod: HCNC

## 2020-12-06 PROCEDURE — 99291 PR CRITICAL CARE, E/M 30-74 MINUTES: ICD-10-PCS | Mod: HCNC,GC,, | Performed by: EMERGENCY MEDICINE

## 2020-12-06 PROCEDURE — 84145 PROCALCITONIN (PCT): CPT | Mod: HCNC

## 2020-12-06 PROCEDURE — 80053 COMPREHEN METABOLIC PANEL: CPT | Mod: HCNC

## 2020-12-06 PROCEDURE — 83605 ASSAY OF LACTIC ACID: CPT | Mod: HCNC

## 2020-12-06 PROCEDURE — 25000003 PHARM REV CODE 250: Mod: HCNC | Performed by: STUDENT IN AN ORGANIZED HEALTH CARE EDUCATION/TRAINING PROGRAM

## 2020-12-06 PROCEDURE — 99291 CRITICAL CARE FIRST HOUR: CPT | Mod: HCNC,GC,, | Performed by: EMERGENCY MEDICINE

## 2020-12-06 PROCEDURE — 85025 COMPLETE CBC W/AUTO DIFF WBC: CPT | Mod: HCNC

## 2020-12-06 PROCEDURE — 93010 EKG 12-LEAD: ICD-10-PCS | Mod: HCNC,,, | Performed by: INTERNAL MEDICINE

## 2020-12-06 PROCEDURE — 82550 ASSAY OF CK (CPK): CPT | Mod: HCNC

## 2020-12-06 PROCEDURE — C9399 UNCLASSIFIED DRUGS OR BIOLOG: HCPCS | Mod: HCNC | Performed by: STUDENT IN AN ORGANIZED HEALTH CARE EDUCATION/TRAINING PROGRAM

## 2020-12-06 PROCEDURE — 99291 CRITICAL CARE FIRST HOUR: CPT | Mod: 25,HCNC

## 2020-12-06 RX ORDER — LOPERAMIDE HYDROCHLORIDE 2 MG/1
2 CAPSULE ORAL EVERY 6 HOURS PRN
Status: DISCONTINUED | OUTPATIENT
Start: 2020-12-06 | End: 2020-12-12 | Stop reason: HOSPADM

## 2020-12-06 RX ORDER — FLUTICASONE FUROATE AND VILANTEROL 200; 25 UG/1; UG/1
1 POWDER RESPIRATORY (INHALATION) DAILY
Status: DISCONTINUED | OUTPATIENT
Start: 2020-12-06 | End: 2020-12-12 | Stop reason: HOSPADM

## 2020-12-06 RX ORDER — IBUPROFEN 200 MG
24 TABLET ORAL
Status: DISCONTINUED | OUTPATIENT
Start: 2020-12-06 | End: 2020-12-12 | Stop reason: HOSPADM

## 2020-12-06 RX ORDER — BENZONATATE 100 MG/1
200 CAPSULE ORAL 3 TIMES DAILY PRN
Status: DISCONTINUED | OUTPATIENT
Start: 2020-12-06 | End: 2020-12-12 | Stop reason: HOSPADM

## 2020-12-06 RX ORDER — TALC
6 POWDER (GRAM) TOPICAL NIGHTLY PRN
Status: DISCONTINUED | OUTPATIENT
Start: 2020-12-06 | End: 2020-12-12 | Stop reason: HOSPADM

## 2020-12-06 RX ORDER — CETIRIZINE HYDROCHLORIDE 5 MG/1
5 TABLET ORAL NIGHTLY
Status: DISCONTINUED | OUTPATIENT
Start: 2020-12-06 | End: 2020-12-12 | Stop reason: HOSPADM

## 2020-12-06 RX ORDER — IPRATROPIUM BROMIDE AND ALBUTEROL SULFATE 2.5; .5 MG/3ML; MG/3ML
3 SOLUTION RESPIRATORY (INHALATION) EVERY 6 HOURS PRN
Status: DISCONTINUED | OUTPATIENT
Start: 2020-12-06 | End: 2020-12-12 | Stop reason: HOSPADM

## 2020-12-06 RX ORDER — CARVEDILOL 25 MG/1
50 TABLET ORAL 2 TIMES DAILY
Status: DISCONTINUED | OUTPATIENT
Start: 2020-12-06 | End: 2020-12-12 | Stop reason: HOSPADM

## 2020-12-06 RX ORDER — ASPIRIN 81 MG/1
81 TABLET ORAL DAILY
Status: DISCONTINUED | OUTPATIENT
Start: 2020-12-07 | End: 2020-12-12 | Stop reason: HOSPADM

## 2020-12-06 RX ORDER — IRBESARTAN 150 MG/1
300 TABLET ORAL NIGHTLY
Status: DISCONTINUED | OUTPATIENT
Start: 2020-12-06 | End: 2020-12-10

## 2020-12-06 RX ORDER — ALBUTEROL SULFATE 90 UG/1
2 AEROSOL, METERED RESPIRATORY (INHALATION) EVERY 6 HOURS
Status: DISCONTINUED | OUTPATIENT
Start: 2020-12-06 | End: 2020-12-06

## 2020-12-06 RX ORDER — PRAVASTATIN SODIUM 40 MG/1
40 TABLET ORAL DAILY
Status: DISCONTINUED | OUTPATIENT
Start: 2020-12-07 | End: 2020-12-12 | Stop reason: HOSPADM

## 2020-12-06 RX ORDER — DILTIAZEM HYDROCHLORIDE 180 MG/1
180 CAPSULE, COATED, EXTENDED RELEASE ORAL DAILY
Status: DISCONTINUED | OUTPATIENT
Start: 2020-12-07 | End: 2020-12-12 | Stop reason: HOSPADM

## 2020-12-06 RX ORDER — HYDRALAZINE HYDROCHLORIDE 50 MG/1
100 TABLET, FILM COATED ORAL 3 TIMES DAILY
Status: DISCONTINUED | OUTPATIENT
Start: 2020-12-07 | End: 2020-12-12 | Stop reason: HOSPADM

## 2020-12-06 RX ORDER — GLUCAGON 1 MG
1 KIT INJECTION
Status: DISCONTINUED | OUTPATIENT
Start: 2020-12-06 | End: 2020-12-12 | Stop reason: HOSPADM

## 2020-12-06 RX ORDER — IBUPROFEN 200 MG
16 TABLET ORAL
Status: DISCONTINUED | OUTPATIENT
Start: 2020-12-06 | End: 2020-12-12 | Stop reason: HOSPADM

## 2020-12-06 RX ORDER — ALBUTEROL SULFATE 90 UG/1
2 AEROSOL, METERED RESPIRATORY (INHALATION) EVERY 6 HOURS
Status: DISCONTINUED | OUTPATIENT
Start: 2020-12-06 | End: 2020-12-12 | Stop reason: HOSPADM

## 2020-12-06 RX ORDER — ACETAMINOPHEN 500 MG
1000 TABLET ORAL EVERY 8 HOURS PRN
Status: DISCONTINUED | OUTPATIENT
Start: 2020-12-06 | End: 2020-12-12 | Stop reason: HOSPADM

## 2020-12-06 RX ORDER — INSULIN ASPART 100 [IU]/ML
0-5 INJECTION, SOLUTION INTRAVENOUS; SUBCUTANEOUS
Status: DISCONTINUED | OUTPATIENT
Start: 2020-12-06 | End: 2020-12-08

## 2020-12-06 RX ORDER — MONTELUKAST SODIUM 10 MG/1
10 TABLET ORAL NIGHTLY
Status: DISCONTINUED | OUTPATIENT
Start: 2020-12-06 | End: 2020-12-12 | Stop reason: HOSPADM

## 2020-12-06 RX ORDER — ONDANSETRON 8 MG/1
8 TABLET, ORALLY DISINTEGRATING ORAL EVERY 8 HOURS PRN
Status: DISCONTINUED | OUTPATIENT
Start: 2020-12-06 | End: 2020-12-12 | Stop reason: HOSPADM

## 2020-12-06 RX ORDER — ENOXAPARIN SODIUM 100 MG/ML
30 INJECTION SUBCUTANEOUS EVERY 24 HOURS
Status: DISCONTINUED | OUTPATIENT
Start: 2020-12-06 | End: 2020-12-10

## 2020-12-06 RX ORDER — SODIUM CHLORIDE 0.9 % (FLUSH) 0.9 %
10 SYRINGE (ML) INJECTION
Status: DISCONTINUED | OUTPATIENT
Start: 2020-12-06 | End: 2020-12-11

## 2020-12-06 RX ORDER — CHOLECALCIFEROL (VITAMIN D3) 25 MCG
2000 TABLET ORAL DAILY
Status: DISCONTINUED | OUTPATIENT
Start: 2020-12-07 | End: 2020-12-12 | Stop reason: HOSPADM

## 2020-12-06 RX ORDER — ASCORBIC ACID 500 MG
500 TABLET ORAL 2 TIMES DAILY
Status: DISCONTINUED | OUTPATIENT
Start: 2020-12-06 | End: 2020-12-12 | Stop reason: HOSPADM

## 2020-12-06 RX ORDER — AZITHROMYCIN 250 MG/1
250 TABLET, FILM COATED ORAL DAILY
Status: DISCONTINUED | OUTPATIENT
Start: 2020-12-07 | End: 2020-12-07

## 2020-12-06 RX ADMIN — INSULIN DETEMIR 15 UNITS: 100 INJECTION, SOLUTION SUBCUTANEOUS at 10:12

## 2020-12-06 RX ADMIN — Medication 500 MG: at 10:12

## 2020-12-06 RX ADMIN — CARVEDILOL 50 MG: 25 TABLET, FILM COATED ORAL at 10:12

## 2020-12-06 RX ADMIN — MONTELUKAST 10 MG: 10 TABLET, FILM COATED ORAL at 10:12

## 2020-12-06 RX ADMIN — CETIRIZINE HYDROCHLORIDE 5 MG: 5 TABLET ORAL at 10:12

## 2020-12-07 PROBLEM — S22.39XA RIB FRACTURE: Status: RESOLVED | Noted: 2019-12-17 | Resolved: 2020-12-07

## 2020-12-07 PROBLEM — J12.82 PNEUMONIA DUE TO COVID-19 VIRUS: Status: ACTIVE | Noted: 2020-12-06

## 2020-12-07 LAB
ALBUMIN SERPL BCP-MCNC: 2.5 G/DL (ref 3.5–5.2)
ALP SERPL-CCNC: 73 U/L (ref 55–135)
ALT SERPL W/O P-5'-P-CCNC: 14 U/L (ref 10–44)
ANION GAP SERPL CALC-SCNC: 9 MMOL/L (ref 8–16)
APTT BLDCRRT: 37.9 SEC (ref 21–32)
AST SERPL-CCNC: 18 U/L (ref 10–40)
BASOPHILS # BLD AUTO: 0.02 K/UL (ref 0–0.2)
BASOPHILS NFR BLD: 0.3 % (ref 0–1.9)
BILIRUB SERPL-MCNC: 0.2 MG/DL (ref 0.1–1)
BNP SERPL-MCNC: 269 PG/ML (ref 0–99)
BUN SERPL-MCNC: 24 MG/DL (ref 8–23)
CALCIUM SERPL-MCNC: 8.1 MG/DL (ref 8.7–10.5)
CHLORIDE SERPL-SCNC: 103 MMOL/L (ref 95–110)
CO2 SERPL-SCNC: 21 MMOL/L (ref 23–29)
CREAT SERPL-MCNC: 1.6 MG/DL (ref 0.5–1.4)
DIFFERENTIAL METHOD: ABNORMAL
EOSINOPHIL # BLD AUTO: 0 K/UL (ref 0–0.5)
EOSINOPHIL NFR BLD: 0.1 % (ref 0–8)
ERYTHROCYTE [DISTWIDTH] IN BLOOD BY AUTOMATED COUNT: 13.4 % (ref 11.5–14.5)
ERYTHROCYTE [SEDIMENTATION RATE] IN BLOOD BY WESTERGREN METHOD: 81 MM/HR (ref 0–36)
EST. GFR  (AFRICAN AMERICAN): 34.6 ML/MIN/1.73 M^2
EST. GFR  (NON AFRICAN AMERICAN): 30 ML/MIN/1.73 M^2
GLUCOSE SERPL-MCNC: 69 MG/DL (ref 70–110)
HCT VFR BLD AUTO: 27.8 % (ref 37–48.5)
HGB BLD-MCNC: 8.5 G/DL (ref 12–16)
IMM GRANULOCYTES # BLD AUTO: 0.04 K/UL (ref 0–0.04)
IMM GRANULOCYTES NFR BLD AUTO: 0.6 % (ref 0–0.5)
INR PPP: 1 (ref 0.8–1.2)
LYMPHOCYTES # BLD AUTO: 1.8 K/UL (ref 1–4.8)
LYMPHOCYTES NFR BLD: 26.7 % (ref 18–48)
MAGNESIUM SERPL-MCNC: 1.6 MG/DL (ref 1.6–2.6)
MCH RBC QN AUTO: 27.8 PG (ref 27–31)
MCHC RBC AUTO-ENTMCNC: 30.6 G/DL (ref 32–36)
MCV RBC AUTO: 91 FL (ref 82–98)
MONOCYTES # BLD AUTO: 0.7 K/UL (ref 0.3–1)
MONOCYTES NFR BLD: 10.4 % (ref 4–15)
NEUTROPHILS # BLD AUTO: 4.2 K/UL (ref 1.8–7.7)
NEUTROPHILS NFR BLD: 61.9 % (ref 38–73)
NRBC BLD-RTO: 0 /100 WBC
PHOSPHATE SERPL-MCNC: 3.7 MG/DL (ref 2.7–4.5)
PLATELET # BLD AUTO: 190 K/UL (ref 150–350)
PMV BLD AUTO: 11.6 FL (ref 9.2–12.9)
POCT GLUCOSE: 191 MG/DL (ref 70–110)
POCT GLUCOSE: 198 MG/DL (ref 70–110)
POCT GLUCOSE: 272 MG/DL (ref 70–110)
POCT GLUCOSE: 324 MG/DL (ref 70–110)
POCT GLUCOSE: 44 MG/DL (ref 70–110)
POTASSIUM SERPL-SCNC: 3.8 MMOL/L (ref 3.5–5.1)
PROCALCITONIN SERPL IA-MCNC: 0.04 NG/ML
PROCALCITONIN SERPL IA-MCNC: 0.06 NG/ML
PROT SERPL-MCNC: 6 G/DL (ref 6–8.4)
PROTHROMBIN TIME: 10.8 SEC (ref 9–12.5)
RBC # BLD AUTO: 3.06 M/UL (ref 4–5.4)
SODIUM SERPL-SCNC: 133 MMOL/L (ref 136–145)
WBC # BLD AUTO: 6.74 K/UL (ref 3.9–12.7)

## 2020-12-07 PROCEDURE — 85652 RBC SED RATE AUTOMATED: CPT | Mod: HCNC

## 2020-12-07 PROCEDURE — 63600175 PHARM REV CODE 636 W HCPCS: Mod: HCNC | Performed by: STUDENT IN AN ORGANIZED HEALTH CARE EDUCATION/TRAINING PROGRAM

## 2020-12-07 PROCEDURE — 83735 ASSAY OF MAGNESIUM: CPT | Mod: HCNC

## 2020-12-07 PROCEDURE — 87205 SMEAR GRAM STAIN: CPT | Mod: HCNC

## 2020-12-07 PROCEDURE — 84145 PROCALCITONIN (PCT): CPT | Mod: HCNC

## 2020-12-07 PROCEDURE — 94640 AIRWAY INHALATION TREATMENT: CPT | Mod: HCNC

## 2020-12-07 PROCEDURE — 11000001 HC ACUTE MED/SURG PRIVATE ROOM: Mod: HCNC

## 2020-12-07 PROCEDURE — 84100 ASSAY OF PHOSPHORUS: CPT | Mod: HCNC

## 2020-12-07 PROCEDURE — 87070 CULTURE OTHR SPECIMN AEROBIC: CPT | Mod: HCNC

## 2020-12-07 PROCEDURE — 94664 DEMO&/EVAL PT USE INHALER: CPT | Mod: HCNC

## 2020-12-07 PROCEDURE — 27100098 HC SPACER: Mod: HCNC

## 2020-12-07 PROCEDURE — 27000646 HC AEROBIKA DEVICE: Mod: HCNC

## 2020-12-07 PROCEDURE — 25000003 PHARM REV CODE 250: Mod: HCNC | Performed by: INTERNAL MEDICINE

## 2020-12-07 PROCEDURE — 85730 THROMBOPLASTIN TIME PARTIAL: CPT | Mod: HCNC

## 2020-12-07 PROCEDURE — 63700000 PHARM REV CODE 250 ALT 637 W/O HCPCS: Mod: HCNC | Performed by: STUDENT IN AN ORGANIZED HEALTH CARE EDUCATION/TRAINING PROGRAM

## 2020-12-07 PROCEDURE — A4216 STERILE WATER/SALINE, 10 ML: HCPCS | Mod: HCNC | Performed by: STUDENT IN AN ORGANIZED HEALTH CARE EDUCATION/TRAINING PROGRAM

## 2020-12-07 PROCEDURE — 80053 COMPREHEN METABOLIC PANEL: CPT | Mod: HCNC

## 2020-12-07 PROCEDURE — 97161 PT EVAL LOW COMPLEX 20 MIN: CPT | Mod: HCNC

## 2020-12-07 PROCEDURE — 87186 SC STD MICRODIL/AGAR DIL: CPT | Mod: HCNC

## 2020-12-07 PROCEDURE — 99900035 HC TECH TIME PER 15 MIN (STAT): Mod: HCNC

## 2020-12-07 PROCEDURE — 94761 N-INVAS EAR/PLS OXIMETRY MLT: CPT | Mod: HCNC

## 2020-12-07 PROCEDURE — 25000003 PHARM REV CODE 250: Mod: HCNC | Performed by: STUDENT IN AN ORGANIZED HEALTH CARE EDUCATION/TRAINING PROGRAM

## 2020-12-07 PROCEDURE — 85610 PROTHROMBIN TIME: CPT | Mod: HCNC

## 2020-12-07 PROCEDURE — 36415 COLL VENOUS BLD VENIPUNCTURE: CPT | Mod: HCNC

## 2020-12-07 PROCEDURE — 97116 GAIT TRAINING THERAPY: CPT | Mod: HCNC

## 2020-12-07 PROCEDURE — 83880 ASSAY OF NATRIURETIC PEPTIDE: CPT | Mod: HCNC

## 2020-12-07 PROCEDURE — 99223 PR INITIAL HOSPITAL CARE,LEVL III: ICD-10-PCS | Mod: HCNC,AI,GC, | Performed by: HOSPITALIST

## 2020-12-07 PROCEDURE — 99223 1ST HOSP IP/OBS HIGH 75: CPT | Mod: HCNC,AI,GC, | Performed by: HOSPITALIST

## 2020-12-07 PROCEDURE — 25000242 PHARM REV CODE 250 ALT 637 W/ HCPCS: Mod: HCNC | Performed by: STUDENT IN AN ORGANIZED HEALTH CARE EDUCATION/TRAINING PROGRAM

## 2020-12-07 PROCEDURE — 87040 BLOOD CULTURE FOR BACTERIA: CPT | Mod: 59,HCNC

## 2020-12-07 PROCEDURE — 94799 UNLISTED PULMONARY SVC/PX: CPT | Mod: HCNC

## 2020-12-07 PROCEDURE — 87077 CULTURE AEROBIC IDENTIFY: CPT | Mod: HCNC

## 2020-12-07 PROCEDURE — 27000221 HC OXYGEN, UP TO 24 HOURS: Mod: HCNC

## 2020-12-07 PROCEDURE — 85025 COMPLETE CBC W/AUTO DIFF WBC: CPT | Mod: HCNC

## 2020-12-07 RX ORDER — AZITHROMYCIN 250 MG/1
250 TABLET, FILM COATED ORAL ONCE
Status: DISCONTINUED | OUTPATIENT
Start: 2020-12-07 | End: 2020-12-08

## 2020-12-07 RX ORDER — MUPIROCIN 20 MG/G
OINTMENT TOPICAL 2 TIMES DAILY
Status: COMPLETED | OUTPATIENT
Start: 2020-12-07 | End: 2020-12-11

## 2020-12-07 RX ORDER — AZITHROMYCIN 250 MG/1
500 TABLET, FILM COATED ORAL DAILY
Status: COMPLETED | OUTPATIENT
Start: 2020-12-08 | End: 2020-12-09

## 2020-12-07 RX ORDER — CEFTRIAXONE 1 G/1
1 INJECTION, POWDER, FOR SOLUTION INTRAMUSCULAR; INTRAVENOUS
Status: DISCONTINUED | OUTPATIENT
Start: 2020-12-07 | End: 2020-12-07

## 2020-12-07 RX ORDER — CARVEDILOL 25 MG/1
50 TABLET ORAL 2 TIMES DAILY WITH MEALS
Status: CANCELLED | OUTPATIENT
Start: 2020-12-07

## 2020-12-07 RX ADMIN — Medication 10 ML: at 09:12

## 2020-12-07 RX ADMIN — DEXTROSE MONOHYDRATE 12.5 G: 25 INJECTION, SOLUTION INTRAVENOUS at 08:12

## 2020-12-07 RX ADMIN — AZITHROMYCIN MONOHYDRATE 250 MG: 250 TABLET ORAL at 09:12

## 2020-12-07 RX ADMIN — ALBUTEROL SULFATE 2 PUFF: 90 AEROSOL, METERED RESPIRATORY (INHALATION) at 01:12

## 2020-12-07 RX ADMIN — ACETAMINOPHEN 1000 MG: 500 TABLET ORAL at 12:12

## 2020-12-07 RX ADMIN — ACETAMINOPHEN 1000 MG: 500 TABLET ORAL at 10:12

## 2020-12-07 RX ADMIN — Medication 500 MG: at 09:12

## 2020-12-07 RX ADMIN — MUPIROCIN: 20 OINTMENT TOPICAL at 09:12

## 2020-12-07 RX ADMIN — HYDRALAZINE HYDROCHLORIDE 100 MG: 50 TABLET, FILM COATED ORAL at 09:12

## 2020-12-07 RX ADMIN — CETIRIZINE HYDROCHLORIDE 5 MG: 5 TABLET ORAL at 09:12

## 2020-12-07 RX ADMIN — PRAVASTATIN SODIUM 40 MG: 40 TABLET ORAL at 09:12

## 2020-12-07 RX ADMIN — Medication 2000 UNITS: at 09:12

## 2020-12-07 RX ADMIN — BENZONATATE 200 MG: 100 CAPSULE ORAL at 10:12

## 2020-12-07 RX ADMIN — DEXAMETHASONE 6 MG: 4 TABLET ORAL at 09:12

## 2020-12-07 RX ADMIN — INSULIN ASPART 2 UNITS: 100 INJECTION, SOLUTION INTRAVENOUS; SUBCUTANEOUS at 09:12

## 2020-12-07 RX ADMIN — MONTELUKAST 10 MG: 10 TABLET, FILM COATED ORAL at 09:12

## 2020-12-07 RX ADMIN — IRBESARTAN 300 MG: 150 TABLET ORAL at 09:12

## 2020-12-07 RX ADMIN — DILTIAZEM HYDROCHLORIDE 180 MG: 180 CAPSULE, COATED, EXTENDED RELEASE ORAL at 09:12

## 2020-12-07 RX ADMIN — ENOXAPARIN SODIUM 30 MG: 100 INJECTION SUBCUTANEOUS at 12:12

## 2020-12-07 RX ADMIN — INSULIN ASPART 3 UNITS: 100 INJECTION, SOLUTION INTRAVENOUS; SUBCUTANEOUS at 04:12

## 2020-12-07 RX ADMIN — FLUTICASONE FUROATE AND VILANTEROL TRIFENATATE 1 PUFF: 200; 25 POWDER RESPIRATORY (INHALATION) at 02:12

## 2020-12-07 RX ADMIN — REMDESIVIR 200 MG: 100 INJECTION, POWDER, LYOPHILIZED, FOR SOLUTION INTRAVENOUS at 04:12

## 2020-12-07 RX ADMIN — ASPIRIN 81 MG: 81 TABLET, COATED ORAL at 09:12

## 2020-12-07 RX ADMIN — IRBESARTAN 300 MG: 150 TABLET ORAL at 12:12

## 2020-12-07 RX ADMIN — ALBUTEROL SULFATE 2 PUFF: 90 AEROSOL, METERED RESPIRATORY (INHALATION) at 07:12

## 2020-12-07 RX ADMIN — CEFTRIAXONE SODIUM 1 G: 1 INJECTION, POWDER, FOR SOLUTION INTRAMUSCULAR; INTRAVENOUS at 09:12

## 2020-12-07 RX ADMIN — CARVEDILOL 50 MG: 25 TABLET, FILM COATED ORAL at 09:12

## 2020-12-07 RX ADMIN — ENOXAPARIN SODIUM 30 MG: 100 INJECTION SUBCUTANEOUS at 04:12

## 2020-12-07 RX ADMIN — INSULIN DETEMIR 15 UNITS: 100 INJECTION, SOLUTION SUBCUTANEOUS at 09:12

## 2020-12-07 RX ADMIN — FLUTICASONE FUROATE AND VILANTEROL TRIFENATATE 1 PUFF: 200; 25 POWDER RESPIRATORY (INHALATION) at 07:12

## 2020-12-07 RX ADMIN — MELATONIN TAB 3 MG 6 MG: 3 TAB at 10:12

## 2020-12-07 RX ADMIN — IPRATROPIUM BROMIDE AND ALBUTEROL SULFATE 3 ML: .5; 2.5 SOLUTION RESPIRATORY (INHALATION) at 02:12

## 2020-12-07 NOTE — ED NOTES
MD Rhodes notified patient is hypertensive at 213/ 83. Per MD, give patient's night dose of carvedilol.

## 2020-12-07 NOTE — ASSESSMENT & PLAN NOTE
COVID-19 Virus Infection  Viral Pneumonia due to COVID-19  - COVID-19 testing: Collection Date: 12/1/2020 Collection Time:   9:22 AM   - Isolation: Airborne/Droplet. Surgical mask on patient. Notify Infection Control  - Diagnostics: Trend Q48hrs if stable, more frequently if patient decompensating       - CBC       - CMP       - Mg        - D-dimer       - Ferritin       - LDH    Lymphopenia, hyponatremia, hyperferritinemia, elevated troponin, elevated d-dimer, age, and medical comorbidities are significant predictors of poor clinical outcome    - Management: Per [facility] COVID Treatment Protocol    - Telemetry & Continuous Pulse Oximetry    - Nutrition:        - Multivitamin PO daily - pt allergic to iodine       - Boost supplement       - Vitamin D 2000IU daily - home dose        - Ascorbic acid 500mg PO bid    - Supportive Care:       - acetaminophen 650mg PO Q6hr PRN fever/headache       - loperamide PRN viral diarrhea       - IVF if indicated, restrictive strategy preferred, no maintenance IV if able       - VTE PPx: enoxaparin 30 given low CrCl (13mL/min)    - Antibiotics:       - azithromycin 250mg po daily x 2 days - patient was prescribed Zpack on the 3rd       - can add ceftriaxone of there is further concern for concomitant bacterial PNA     - Remdesivir      - Dexamethasone 6mg daily x 10 days      Acute Hypoxemic Respiratory Failure    - Order RT consult via Respiratory Communication for COVID Protocols    - Incentive Spirometer Q4h, Flutter Valve Q4h    - Continuous Pulse Oximetry, goal SpO2 92-96%    - Supplemental O2 via LFNC, VentiMask, or HFNC (see Respiratory Support Oxygen Therapies)    - If wheezing, albuterol INH Q6h scheduled & PRN    - Proning Protocol if patient is a candidate (see  Proning Protocol)   - GCS >13, able to self-prone    - If deterioration, may warrant trial of NIPPV in neg pressure room or immediate ICU consult

## 2020-12-07 NOTE — ED NOTES
80 y.o. female with PMH CAD, CHF,DM,CKD, breast cancer, and skin cancer to ED via EMS with c.o. generalized weakness and cough x 3 days with progressively worsening symptoms. Per EMS patient O2 saturation was 75% upon their arrival. EMS placed a COVID oxygen mask on the patient at 8L and her O2 saturation came up to 98%. Patient arrives with COVID mask in place, O2 saturation 100%. Per patient, her  is admitted to the ICU for COVID. Patient endorses mild shortness of breath. Patient denies fever/chills, denies n/v/d, denies chest pain. Patient awake, alert, and oriented x 4. No apparent distress noted. VS currently stable. Patient assisted onto stretcher and changed into a gown. Patient placed on cardiac monitor, continuous pulse oximetry and automatic blood pressure cuff. Bed placed in low locked position, side rails up x 2, call light is within reach of patient orientation to room and explanation of wait provided to patient, alarms set and turned on for monitor and pulse ox, awaiting MD evaluation and orders, will continue to monitor.      Patient's daughter (Betsy) - cell   Home

## 2020-12-07 NOTE — ASSESSMENT & PLAN NOTE
Baseline Cr noted to be ~1.6, most recently in the last 2 months Cr at 1.8    - Renally dose all medications to current GFR  - Avoid nephrotoxic meds/agents such as NSAIDs, gadolinium and IV radiocontrast  - Strict intake and output and daily standing weights  - Trend daily labs

## 2020-12-07 NOTE — NURSING
Received via stretcher from ED to Room 78935. Assisted with moving pt to bed in room. Pt alert and oriented x4. Monitors connected. Oriented pt to staff, room, plan of care and call light with call light placed within reach. Skin inspected; no redness or breakdown present. H&P done. Assessment done per flowsheet. Reports shortness of breath on exertion. Denies any pain or discomfort at present. Bed alarm placed on.

## 2020-12-07 NOTE — PLAN OF CARE
Problem: Physical Therapy Goal  Goal: Physical Therapy Goal  Description: Goals to be met by: 20     Patient will increase functional independence with mobility by performin. Supine to sit with Bethany  2. Sit to supine with Bethany  3. Sit to stand transfer with Modified Bethany  4. Gait  x 150 feet with Modified Bethany using LRAD, if needed.     Outcome: Ongoing, Progressing     Initial evaluation completed. Patient tolerated assessment well. Established POC and goals. Patient would continue to benefit from skilled PT services in order to improve functional mobility independence.     Adriana Mueller, PT, DPT  2020

## 2020-12-07 NOTE — ED PROVIDER NOTES
Encounter Date: 12/6/2020       History     Chief Complaint   Patient presents with    Shortness of Breath     COVID +; initial SpO2 in the 70s on room air.     Hypoxia     81 yo F w/ pmh of HTN, IDDM, HLD, Asthma, CHF, CKD IV, s/p pacemaker and previous history of cancer here for complication of a recent COIVD diagnosis.     Patient was diagnosed with COVID Tuesday 12/01/2020 when she got tested after her daughter tested positive. Patient was initially asymptomatic and her PCP put her on azithromycin. Today patient started becoming fatigued, developed fevers, cough, diarrhea, and became short of breath. EMS was called on presentation patient was ill appearing and satt'ing in the low 80s and she was placed on a COVID mask. Patient was placed on 8L in the ED and she was satt'ing in the high 90s. Patient is not in distress on presentation and denies SOB but she is having labored breathing. Patient denies CP, HA, n/v, constipation, or urinary symptoms. Patient wants to be intubated and to receive CPR is the need arises. Of note, the patient's  is currently admitted in the ICU for COVID complications.     The history is provided by the patient. No  was used.     Review of patient's allergies indicates:   Allergen Reactions    Iodine and iodide containing products Hives    Nifedipine      weakness     Past Medical History:   Diagnosis Date    Acute hypoxemic respiratory failure 12/19/2019    Acute right-sided thoracic back pain 12/9/2019    Allergy     Asthma     Basal cell carcinoma     left forehead    Basal cell carcinoma     left nose    Basal cell carcinoma 05/20/2015    right nose    Basal cell carcinoma 12/22/2015    left lower post neck    Basal cell carcinoma 12/03/2019    left ant scalp     Breast cancer     CAD (coronary artery disease)     Cardiomyopathy     Cardiomyopathy, ischemic     Cataract     CHF (congestive heart failure)     Chronic kidney disease, stage  3, mod decreased GFR 2/14/2017    Colon polyp 2011    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, with long-term current use of insulin 2/22/2018    COPD (chronic obstructive pulmonary disease)     COPD exacerbation 4/8/2018    Defibrillator discharge     Diabetes mellitus     Diabetes mellitus type II     Diabetes with neurologic complications     Goiter     MNG    HX: breast cancer     Hyperlipidemia     Hypertension     Iron deficiency anemia 5/16/2017    Left kidney mass     Meningioma     Osteoporosis, postmenopausal     Pacemaker     Pneumonia 12/8/2019    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    Pseudomonas pneumonia     Skin cancer     s/p excision    Sleep apnea     CPAP    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/2015    mid forehead    Unspecified vitamin D deficiency     Ventricular tachycardia     Vitamin B12 deficiency     Vitamin D deficiency disease      Past Surgical History:   Procedure Laterality Date    BASAL CELL CARCINOMA EXCISION      posterior neck and nose    BREAST BIOPSY      BREAST CYST EXCISION Left     BREAST SURGERY      CARDIAC DEFIBRILLATOR PLACEMENT      x 2    CATARACT EXTRACTION W/  INTRAOCULAR LENS IMPLANT Bilateral     CHOLECYSTECTOMY      COLONOSCOPY N/A 11/5/2019    Procedure: COLONOSCOPY;  Surgeon: Boaz Botello MD;  Location: [facility] ENDO ([address] [place] [address]);  Service: Endoscopy;  Laterality: N/A;  [facility] [employer] -     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    FRACTURE SURGERY      left elbow and wrist as a child    HYSTERECTOMY      MASTECTOMY Right     REPLACEMENT OF IMPLANTABLE CARDIOVERTER-DEFIBRILLATOR (ICD) GENERATOR N/A 12/17/2018    Procedure: REPLACEMENT, ICD GENERATOR;  Surgeon: Jan Mckeon MD;  Location: [facility] EP LAB;  Service: Cardiology;  Laterality: N/A;  DONNA, CRT-D gen sulema, MDT, MAC, SK, 3 Prep    REVISION OF SKIN POCKET FOR CARDIOVERTER-DEFIBRILLATOR   12/17/2018    Procedure: Revision, Skin Pocket, For Cardioverter-Defibrillator;  Surgeon: Jan Mckeon MD;  Location: [facility] LAB;  Service: Cardiology;;    SQUAMOUS CELL CARCINOMA EXCISION      remved from forehead    TONSILLECTOMY       Family History   Problem Relation Age of Onset    Diabetes Father     Heart disease Father     Diabetes Sister     Heart disease Sister     Diabetes Brother     Heart disease Brother     Hypertension Brother     Diabetes Brother     Heart disease Brother     Hypertension Brother     Diabetes Brother     Heart disease Brother     Cancer Brother         colon    Diabetes Brother     Cancer Son         skin    Diabetes Son         prediabetes    Diabetes Daughter         prediabetes    Cancer Daughter         melanoma    Obesity Daughter     Melanoma Daughter     Diabetes Son     Asthma Mother     Hypertension Mother     Stroke Mother     No Known Problems Maternal Grandmother     No Known Problems Maternal Grandfather     No Known Problems Paternal Grandmother     No Known Problems Paternal Grandfather     Amblyopia Neg Hx     Blindness Neg Hx     Cataracts Neg Hx     Glaucoma Neg Hx     Macular degeneration Neg Hx     Retinal detachment Neg Hx     Strabismus Neg Hx     Thyroid disease Neg Hx      Social History     Tobacco Use    Smoking status: Never Smoker    Smokeless tobacco: Never Used   Substance Use Topics    Alcohol use: No     Alcohol/week: 0.0 standard drinks    Drug use: No     Review of Systems   Constitutional: Positive for activity change, chills, fatigue and fever. Negative for appetite change and diaphoresis.   HENT: Negative for congestion.    Respiratory: Positive for cough, shortness of breath and wheezing. Negative for apnea, choking, chest tightness and stridor.    Cardiovascular: Negative for chest pain, palpitations and leg swelling.   Gastrointestinal: Positive for diarrhea. Negative for abdominal distention,  abdominal pain, blood in stool, constipation, nausea and vomiting.   Endocrine: Negative for polyuria.   Genitourinary: Negative for difficulty urinating, dysuria, frequency, hematuria and urgency.   Musculoskeletal: Negative for arthralgias, back pain, joint swelling and neck pain.   Skin: Negative for color change, pallor, rash and wound.   Neurological: Positive for weakness. Negative for dizziness, tremors, seizures, syncope, light-headedness, numbness and headaches.   Psychiatric/Behavioral: Negative for agitation, behavioral problems and confusion. The patient is not nervous/anxious.        Physical Exam     Initial Vitals [12/06/20 1858]   BP Pulse Resp Temp SpO2   (!) 161/67 72 20 98.4 °F (36.9 °C) 100 %      MAP       --         Physical Exam    Nursing note and vitals reviewed.  Constitutional: She appears well-developed and well-nourished. She is not diaphoretic. She appears distressed.   HENT:   Head: Normocephalic and atraumatic.   Right Ear: External ear normal.   Left Ear: External ear normal.   Nose: Nose normal.   Mouth/Throat: Oropharynx is clear and moist.   Eyes: Right eye exhibits no discharge. Left eye exhibits no discharge. No scleral icterus.   Cardiovascular: Normal rate, regular rhythm, normal heart sounds and intact distal pulses. Exam reveals no gallop and no friction rub.    No murmur heard.  Pulmonary/Chest: No stridor. She is in respiratory distress. She has decreased breath sounds. She has wheezes. She has no rhonchi. She has no rales. She exhibits no tenderness.   Abdominal: Soft. Bowel sounds are normal. She exhibits no distension. There is no abdominal tenderness. There is no rebound.   Musculoskeletal: No tenderness or edema.   Neurological: She is alert and oriented to person, place, and time. GCS score is 15. GCS eye subscore is 4. GCS verbal subscore is 5. GCS motor subscore is 6.   Skin: Skin is warm and dry. No rash and no abscess noted. No erythema. No pallor.   Psychiatric:  She has a normal mood and affect. Her behavior is normal. Judgment and thought content normal.         ED Course   Procedures  Labs Reviewed   CBC W/ AUTO DIFFERENTIAL - Abnormal; Notable for the following components:       Result Value    RBC 3.43 (*)     Hemoglobin 9.8 (*)     Hematocrit 31.3 (*)     MCHC 31.3 (*)     Immature Granulocytes 1.0 (*)     Immature Grans (Abs) 0.10 (*)     All other components within normal limits   COMPREHENSIVE METABOLIC PANEL - Abnormal; Notable for the following components:    Sodium 131 (*)     CO2 21 (*)     Glucose 188 (*)     BUN 26 (*)     Creatinine 1.8 (*)     Albumin 2.8 (*)     eGFR if  30.0 (*)     eGFR if non  26.0 (*)     All other components within normal limits   C-REACTIVE PROTEIN - Abnormal; Notable for the following components:    CRP 41.0 (*)     All other components within normal limits   FERRITIN   LACTATE DEHYDROGENASE   CK   LACTIC ACID, PLASMA   TROPONIN I     EKG Readings: (Independently Interpreted)   Rhythm: Normal Sinus Rhythm. Heart Rate: 73. Conduction: RBBB.   Atrial paced 73, no acute ischemic changes       Imaging Results          X-Ray Chest AP Portable (Final result)  Result time 12/06/20 19:18:37    Final result by Boaz Ayon MD (12/06/20 19:18:37)                 Impression:      Cardiac device and grossly stable chronic findings without radiographic evidence of failure, new focal consolidation, or other source of cough/shortness of breath, noting that early/mild viral pneumonia may be radiographically occult.      Electronically signed by: Boaz Ayon MD  Date:    12/06/2020  Time:    19:18             Narrative:    EXAMINATION:  XR CHEST AP PORTABLE    CLINICAL HISTORY:  Suspected Covid-19 Virus Infection;    TECHNIQUE:  Single frontal view of the chest was performed.    COMPARISON:  Chest radiograph 11/17/2020    FINDINGS:  Monitoring leads and tubing overlie the chest.  Patient is slightly  rotated.    Left chest multi lead cardiac device is stable.  Cardiomediastinal silhouette is midline and stable without evidence of failure.  Scattered linear opacities consistent with subsegmental scarring versus atelectasis.  The lungs are symmetrically well expanded grossly similar minimal nonspecific interstitial coarsening consistent with chronic interstitial lung changes.  No large consolidation or new focal opacity.  No pleural effusion or pneumothorax.  Hilar contours are unchanged.  Osseous structures appear stable without acute process seen.  PA and lateral views can be obtained.                              X-Rays:   Independently Interpreted Readings:   Chest X-Ray: Cardiac device, no lobar infiltrate, likely fluffy inf c/w viral pna     Medical Decision Making:   History:   I obtained history from: EMS provider.       <> Summary of History: satting low 80s on arrival of ems  Old Medical Records: I decided to obtain old medical records.  Initial Assessment:   79 yo F w/ pmh of HTN, IDDM, HLD, Asthma, CHF, CKD IV, s/p pacemaker and previous history of cancer here for complication of a recent COIVD diagnosis.   Differential Diagnosis:   COVID complication, Bacterial PNA, PE, Gastroenteritis  Independently Interpreted Test(s):   I have ordered and independently interpreted X-rays - see prior notes.  I have ordered and independently interpreted EKG Reading(s) - see prior notes  Clinical Tests:   Lab Tests: Ordered and Reviewed  Radiological Study: Ordered and Reviewed  Medical Tests: Ordered and Reviewed  ED Management:  Patient is having labored breathing on presentation and is wheezy. Satt'ing in the high 90s on 8L on a COVID mask. COVID workup ordered (CBC, CMP, CRP, Ferritin, LA, Trop, CK, CXR, ECG).   Other:   I discussed test(s) with the performing physician.  I have discussed this case with another health care provider.       <> Summary of the Discussion: Hospital medicine        APC / Resident Notes:    8:21 PM  CMP is back with some mild hyponatermia, Creatinine is elevated but around baseline, otherwise remainder is not that impressive. Patient admitted to IM 1.          Attending Attestation:   Physician Attestation Statement for Resident:  As the supervising MD   Physician Attestation Statement: I have personally seen and examined this patient.   I agree with the above history. -: 81 y/o F tested positive for covid on 12/1 now with increasing SOB   As the supervising MD I agree with the above PE.   -: Hypoxic on room air   As the supervising MD I agree with the above treatment, course, plan, and disposition.   -: The high probability of sudden, clinically significant deterioration in the patient's condition required the highest level of my preparedness to intervene urgently.  Services included the following: chart data review, reviewing nursing notes and/or old charts, documentation time, consultant collaboration regarding findings and treatment options, medication orders and management, direct patient care, vital sign assessments and ordering, interpreting and reviewing diagnostic studies/lab tests.      I spent 40 minutes on total Critical Care time, which includes only time during which I was engaged in work directly related to the patient's care, as described above, whether at the bedside or elsewhere in the Emergency Department.  It did not include time spent on separately billable procedures nor did it include the time spent by residents, students, nurses or physician assistants on this patient's care.     Critical Care was needed secondary to the following conditions:  Hypoxia and respiratory distress  I have reviewed and agree with the residents interpretation of the following: lab data, x-rays and EKG.                              Clinical Impression:       ICD-10-CM ICD-9-CM   1. COVID-19  U07.1 079.89   2. Suspected COVID-19 virus infection  Z20.828 V01.79   3. Acute hypoxemic respiratory failure   J96.01 518.81   4. Cough  R05 786.2   5. SOB (shortness of breath)  R06.02 786.05                      Disposition:   Disposition: Admitted  Condition: Serious     ED Disposition Condition    Admit                             Aubrey Fuentes MD  Resident  12/06/20 2022       Donna Heath MD  12/08/20 0003

## 2020-12-07 NOTE — PLAN OF CARE
SW conducted patient discharge planning assessment with patient. Patient states that she admitted to [facility] from home. She reports that she currently resides in a one story home with her spouse that is currently hospitalized at [facility] as well. She states that she only uses a walker as needed and is not on dialysis. According to patient, her daughter will transport her home when she becomes medically stable to [place].    PCP  Emelina Gaston [phone]      Emergency   Lani Bowen daughter, [phone]    PHARMACY  [facility]  [address] [place] [address]         12/07/20 0937   Discharge Assessment   Assessment Type Discharge Planning Assessment   Confirmed/corrected address and phone number on facesheet? Yes   Assessment information obtained from? Patient   Communicated expected length of stay with patient/caregiver yes   Prior to hospitilization cognitive status: Alert/Oriented   Prior to hospitalization functional status: Independent   Current cognitive status: Alert/Oriented   Current Functional Status: Independent   Facility Arrived From: HOME   Lives With spouse   Able to Return to Prior Arrangements no   Is patient able to care for self after discharge? Unable to determine at this time (comments)   Who are your caregiver(s) and their phone number(s)? Betsy Bowen, daughter [phone]   Patient's perception of discharge disposition home or selfcare   Readmission Within the Last 30 Days no previous admission in last 30 days   Patient currently being followed by outpatient case management? No   Patient currently receives any other outside agency services? No   Equipment Currently Used at Home walker, rolling   Do you have any problems affording any of your prescribed medications? No   Is the patient taking medications as prescribed? yes   Does the patient have transportation home? Yes   Transportation Anticipated family or friend will provide   Does the patient receive services at the  [facility]? No   Discharge Plan A Home with family   Discharge Plan B Home Health   DME Needed Upon Discharge  none   Patient/Family in Agreement with Plan yes      Ann Mitchell LMSW  [facility]   [phone]

## 2020-12-07 NOTE — PLAN OF CARE
12/07/20 1308   Post-Acute Status   Post-Acute Authorization Other   Other Status No Post-Acute Service Needs   Discharge Delays None known at this time   Discharge Plan   Discharge Plan A Home with family   Discharge Plan B Home with family;Home Health     CM to follow for discharge planning needs.  TIMUR ArtisN RN  Case Management  [phone]

## 2020-12-07 NOTE — ASSESSMENT & PLAN NOTE
- continue scheduled Albuterol q6h, Duo nebs PRN  - continue Breo (home Advair not on formulary)  - further treatment per covid protocol

## 2020-12-07 NOTE — HPI
This is an 80 year old female with pmhx of Asthma, CAD, ischemic cardiomyopathy, CHF, CKD3, insulin dependent T2DM,  breast cancer, HTN, HLD who was tested positive for COVID after her daughter and  had tested positive for it last week. She was initially sent home with normal vitals and examination. She was diagnosed on the 1st of this month. Today she presents with fevers, increased fatigue, loose stools, and dyspnea. She has a chronic cough with occasional green tinged sputum but is now having clear sputum. She called EMS and was found to be satting in the low 80's and responded to oxygen supplementation. Otherwise she does not have any other complaints. Denies any chest pain, headache, nausea, vomiting.    In the ED she was placed on 8L NC with saturations ~99%, hypertensive 161/67, afebrile. Laboratory evaluation revealed chronic anemia, mild hyponatremia. Imaging with x-ray of the chest revealed stable findings.

## 2020-12-07 NOTE — H&P
[facility] - ICU 14 [facility] Medicine  History & Physical    Patient Name: Myesha Quinones  MRN: [id number]  Admission Date: 12/6/2020  Attending Physician: Morro Porter MD   Primary Care Provider: Emelina Gaston MD         Patient information was obtained from patient, past medical records and ER records.     Subjective:     Principal Problem:COVID-19    Chief Complaint:   Chief Complaint   Patient presents with    Shortness of Breath     COVID +; initial SpO2 in the 70s on room air.     Hypoxia        HPI: This is an 80 year old female with pmhx of Asthma, CAD, ischemic cardiomyopathy, CHF, CKD3, insulin dependent T2DM,  breast cancer, HTN, HLD who was tested positive for COVID after her daughter and  had tested positive for it last week. She was initially sent home with normal vitals and examination. She was diagnosed on the 1st of this month. Today she presents with fevers, increased fatigue, loose stools, and dyspnea. She has a chronic cough with occasional green tinged sputum but is now having clear sputum. She called EMS and was found to be satting in the low 80's and responded to oxygen supplementation. Otherwise she does not have any other complaints. Denies any chest pain, headache, nausea, vomiting.    In the ED she was placed on 8L NC with saturations ~99%, hypertensive 161/67, afebrile. Laboratory evaluation revealed chronic anemia, mild hyponatremia. Imaging with x-ray of the chest revealed stable findings.    Past Medical History:   Diagnosis Date    Acute hypoxemic respiratory failure 12/19/2019    Acute right-sided thoracic back pain 12/9/2019    Allergy     Asthma     Basal cell carcinoma     left forehead    Basal cell carcinoma     left nose    Basal cell carcinoma 05/20/2015    right nose    Basal cell carcinoma 12/22/2015    left lower post neck    Basal cell carcinoma 12/03/2019    left ant scalp     Breast cancer     CAD (coronary artery disease)      Cardiomyopathy     Cardiomyopathy, ischemic     Cataract     CHF (congestive heart failure)     Chronic kidney disease, stage 3, mod decreased GFR 2/14/2017    Colon polyp 2011    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, with long-term current use of insulin 2/22/2018    COPD (chronic obstructive pulmonary disease)     COPD exacerbation 4/8/2018    Defibrillator discharge     Diabetes mellitus     Diabetes mellitus type II     Diabetes with neurologic complications     Goiter     MNG    HX: breast cancer     Hyperlipidemia     Hypertension     Iron deficiency anemia 5/16/2017    Left kidney mass     Meningioma     Osteoporosis, postmenopausal     Pacemaker     Pneumonia 12/8/2019    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    Pseudomonas pneumonia     Skin cancer     s/p excision    Sleep apnea     CPAP    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/2015    mid forehead    Unspecified vitamin D deficiency     Ventricular tachycardia     Vitamin B12 deficiency     Vitamin D deficiency disease        Past Surgical History:   Procedure Laterality Date    BASAL CELL CARCINOMA EXCISION      posterior neck and nose    BREAST BIOPSY      BREAST CYST EXCISION Left     BREAST SURGERY      CARDIAC DEFIBRILLATOR PLACEMENT      x 2    CATARACT EXTRACTION W/  INTRAOCULAR LENS IMPLANT Bilateral     CHOLECYSTECTOMY      COLONOSCOPY N/A 11/5/2019    Procedure: COLONOSCOPY;  Surgeon: Boaz Botello MD;  Location: [facility] ENDO ([address] [place] [address]);  Service: Endoscopy;  Laterality: N/A;  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    FRACTURE SURGERY      left elbow and wrist as a child    HYSTERECTOMY      MASTECTOMY Right     REPLACEMENT OF IMPLANTABLE CARDIOVERTER-DEFIBRILLATOR (ICD) GENERATOR N/A 12/17/2018    Procedure: REPLACEMENT, ICD GENERATOR;  Surgeon: Jan Mckeon MD;  Location: [facility] EP LAB;   Service: Cardiology;  Laterality: N/A;  DONNA, CRT-D gen change, MDT, MAC, SK, 3 Prep    REVISION OF SKIN POCKET FOR CARDIOVERTER-DEFIBRILLATOR  12/17/2018    Procedure: Revision, Skin Pocket, For Cardioverter-Defibrillator;  Surgeon: Jan Mckeon MD;  Location: [facility] EP LAB;  Service: Cardiology;;    SQUAMOUS CELL CARCINOMA EXCISION      remved from forehead    TONSILLECTOMY         Review of patient's allergies indicates:   Allergen Reactions    Iodine and iodide containing products Hives    Nifedipine      weakness       Current Facility-Administered Medications on File Prior to Encounter   Medication    0.9%  NaCl infusion    fluorescein 500 mg/5 mL (10 %) injection 500 mg     Current Outpatient Medications on File Prior to Encounter   Medication Sig    albuterol (VENTOLIN HFA) 90 mcg/actuation inhaler INHALE 2 PUFFS INTO THE LUNGS EVERY 6 (SIX) HOURS AS NEEDED FOR WHEEZING. RESCUE    albuterol-ipratropium (DUO-NEB) 2.5 mg-0.5 mg/3 mL nebulizer solution TAKE 1 VIAL BY NEBULIZATION EVERY 4 (FOUR) HOURS. RESCUE    amoxicillin-clavulanate 875-125mg (AUGMENTIN) 875-125 mg per tablet Take 1 tablet by mouth 2 (two) times daily.    aspirin (ENTERIC COATED ASPIRIN) 81 MG EC tablet Take 1 tablet by mouth once daily.     azithromycin (Z-ERYN) 250 MG tablet 2 initially then one daily    benzonatate (TESSALON PERLES) 100 MG capsule Take 2 capsules (200 mg total) by mouth 3 (three) times daily as needed for Cough.    blood sugar diagnostic (ACCU-CHEK ANGELICA) Strp Uses Accu-Check Angelica meter to test BG 4x/day    carvediloL (COREG) 25 MG tablet TAKE 2 TABLETS (50 MG TOTAL) BY MOUTH 2 (TWO) TIMES DAILY.    chlorthalidone (HYGROTEN) 25 MG Tab Take 0.5 tablets (12.5 mg total) by mouth once daily.    cholecalciferol, vitamin D3, (VITAMIN D3) 2,000 unit Tab Take 1 tablet by mouth once daily.     cloNIDine 0.1 mg/24 hr td ptwk (CATAPRES) 0.1 mg/24 hr PLACE 1 PATCH ONTO THE SKIN EVERY 7 DAYS.    CYANOCOBALAMIN, VITAMIN  "B-12, (B-12 DOTS ORAL) Take 1 tablet by mouth once daily.    diltiaZEM (CARDIZEM CD) 180 MG 24 hr capsule Take 1 capsule (180 mg total) by mouth once daily.    doxycycline (VIBRAMYCIN) 100 MG Cap Take 1 capsule (100 mg total) by mouth every 12 (twelve) hours.    fluticasone propionate (FLONASE) 50 mcg/actuation nasal spray 2 sprays (100 mcg total) by Each Nostril route once daily.    hydrALAZINE (APRESOLINE) 100 MG tablet Take 1 tablet (100 mg total) by mouth 3 (three) times daily.    insulin (LANTUS SOLOSTAR U-100 INSULIN) glargine 100 units/mL (3mL) SubQ pen Inject 34 Units into the skin every evening. Cancel the 30 unit prescription    irbesartan (AVAPRO) 300 MG tablet Take 1 tablet (300 mg total) by mouth every evening.    lancets (ACCU-CHEK SOFTCLIX LANCETS) Misc Uses Accu-Chek Angelica meter to test BG 4x/day (Patient taking differently: Uses Accu-Chek Angelica meter to test BG 1x/day)    levocetirizine (XYZAL) 5 MG tablet Take 1 tablet (5 mg total) by mouth every evening.    linaGLIPtin (TRADJENTA) 5 mg Tab tablet Take 1 tablet (5 mg total) by mouth once daily.    magnesium oxide (MAG-OX) 400 mg tablet Take 1 tablet (400 mg total) by mouth once daily.    metFORMIN (GLUCOPHAGE) 500 MG tablet Take 1 tablet (500 mg total) by mouth 2 (two) times daily with meals.    montelukast (SINGULAIR) 10 mg tablet Take 1 tablet (10 mg total) by mouth every evening.    nitroGLYCERIN (NITROSTAT) 0.4 MG SL tablet Place 0.4 mg under the tongue every 5 (five) minutes as needed for Chest pain.     omega-3 fatty acids (FISH OIL) 500 mg Cap Take 1 capsule by mouth Twice daily.    pravastatin (PRAVACHOL) 40 MG tablet TAKE 1 TABLET BY MOUTH EVERY DAY    acetaminophen (TYLENOL) 500 MG tablet Take 1,000 mg by mouth daily as needed for Pain.    fluticasone-salmeterol diskus inhaler 500-50 mcg Inhale 1 puff into the lungs 2 (two) times daily. Controller    pen needle, diabetic (BD ULTRA-FINE MINI PEN NEEDLE) 31 gauge x 3/16" " Ndle USE WITH LANTUS AT BEDTIME     Family History     Problem Relation (Age of Onset)    Asthma Mother    Cancer Brother, Son, Daughter    Diabetes Father, Sister, Brother, Brother, Brother, Brother, Son, Daughter, Son    Heart disease Father, Sister, Brother, Brother, Brother    Hypertension Brother, Brother, Mother    Melanoma Daughter    No Known Problems Maternal Grandmother, Maternal Grandfather, Paternal Grandmother, Paternal Grandfather    Obesity Daughter    Stroke Mother        Tobacco Use    Smoking status: Never Smoker    Smokeless tobacco: Never Used   Substance and Sexual Activity    Alcohol use: No     Alcohol/week: 0.0 standard drinks    Drug use: No    Sexual activity: Yes     Partners: Male     Review of Systems   Constitutional: Positive for chills, fatigue and fever.   HENT: Negative for congestion, rhinorrhea and sore throat.    Eyes: Negative for visual disturbance.   Respiratory: Positive for cough, shortness of breath and wheezing.    Cardiovascular: Negative for chest pain, palpitations and leg swelling.   Gastrointestinal: Positive for diarrhea. Negative for abdominal pain, nausea and vomiting.   Genitourinary: Negative for difficulty urinating, flank pain and hematuria.   Musculoskeletal: Negative for myalgias.   Skin: Negative for rash.   Neurological: Negative for light-headedness and headaches.     Objective:     Vital Signs (Most Recent):  Temp: 98.2 °F (36.8 °C) (12/06/20 2103)  Pulse: 88 (12/06/20 2103)  Resp: 18 (12/06/20 2103)  BP: (!) 177/79 (12/06/20 2103)  SpO2: 99 % (12/06/20 2103) Vital Signs (24h Range):  Temp:  [98.2 °F (36.8 °C)-98.4 °F (36.9 °C)] 98.2 °F (36.8 °C)  Pulse:  [69-88] 88  Resp:  [18-20] 18  SpO2:  [99 %-100 %] 99 %  BP: (161-177)/(67-88) 177/79        There is no height or weight on file to calculate BMI.    Physical Exam  Constitutional:       Appearance: Normal appearance.   HENT:      Head: Normocephalic and atraumatic.      Nose: Nose normal. No  congestion or rhinorrhea.      Mouth/Throat:      Mouth: Mucous membranes are moist.      Pharynx: Oropharynx is clear.   Eyes:      Extraocular Movements: Extraocular movements intact.      Conjunctiva/sclera: Conjunctivae normal.      Pupils: Pupils are equal, round, and reactive to light.   Neck:      Musculoskeletal: Normal range of motion and neck supple.   Cardiovascular:      Rate and Rhythm: Normal rate and regular rhythm.      Heart sounds: No murmur.      Comments: lower extremity mild pitting edema L>R  Pulmonary:      Effort: Respiratory distress present.      Breath sounds: No stridor. Wheezing present. No rhonchi.   Abdominal:      General: Abdomen is flat. There is no distension.      Palpations: Abdomen is soft.      Tenderness: There is no abdominal tenderness.   Musculoskeletal: Normal range of motion.         General: No swelling, deformity or signs of injury.   Skin:     General: Skin is warm and dry.      Capillary Refill: Capillary refill takes less than 2 seconds.   Neurological:      General: No focal deficit present.      Mental Status: She is alert and oriented to person, place, and time.           CRANIAL NERVES     CN III, IV, VI   Pupils are equal, round, and reactive to light.       Significant Labs:   CBC:   Recent Labs   Lab 12/06/20 1852   WBC 10.13   HGB 9.8*   HCT 31.3*        CMP:   Recent Labs   Lab 12/06/20 1852   *   K 4.3      CO2 21*   *   BUN 26*   CREATININE 1.8*   CALCIUM 8.7   PROT 6.9   ALBUMIN 2.8*   BILITOT 0.2   ALKPHOS 86   AST 22   ALT 16   ANIONGAP 10   EGFRNONAA 26.0*     Lactic Acid:   Recent Labs   Lab 12/06/20  1852   LACTATE 2.0     Troponin:   Recent Labs   Lab 12/06/20  1852   TROPONINI 0.022       Recent Labs     12/06/20  1852   CRP 41.0*   FERRITIN 53        Component      Latest Ref Rng & Units 12/1/2020   SARS-CoV2 (COVID-19) Qualitative PCR      Not Detected Detected (A)       Significant Imaging:   X-Ray Chest AP  Portable  Narrative: EXAMINATION:  XR CHEST AP PORTABLE    CLINICAL HISTORY:  Suspected Covid-19 Virus Infection;    TECHNIQUE:  Single frontal view of the chest was performed.    COMPARISON:  Chest radiograph 11/17/2020    FINDINGS:  Monitoring leads and tubing overlie the chest.  Patient is slightly rotated.    Left chest multi lead cardiac device is stable.  Cardiomediastinal silhouette is midline and stable without evidence of failure.  Scattered linear opacities consistent with subsegmental scarring versus atelectasis.  The lungs are symmetrically well expanded grossly similar minimal nonspecific interstitial coarsening consistent with chronic interstitial lung changes.  No large consolidation or new focal opacity.  No pleural effusion or pneumothorax.  Hilar contours are unchanged.  Osseous structures appear stable without acute process seen.  PA and lateral views can be obtained.  Impression: Cardiac device and grossly stable chronic findings without radiographic evidence of failure, new focal consolidation, or other source of cough/shortness of breath, noting that early/mild viral pneumonia may be radiographically occult.    Electronically signed by: Boaz Ayon MD  Date:    12/06/2020  Time:    19:18        Assessment/Plan:     * COVID-19  COVID-19 Virus Infection  Viral Pneumonia due to COVID-19  - COVID-19 testing: Collection Date: 12/1/2020 Collection Time:   9:22 AM   - Isolation: Airborne/Droplet. Surgical mask on patient. Notify Infection Control  - Diagnostics: Trend Q48hrs if stable, more frequently if patient decompensating       - CBC       - CMP       - Mg        - D-dimer       - Ferritin       - LDH    Lymphopenia, hyponatremia, hyperferritinemia, elevated troponin, elevated d-dimer, age, and medical comorbidities are significant predictors of poor clinical outcome    - Management: Per [facility] COVID Treatment Protocol    - Telemetry & Continuous Pulse Oximetry    - Nutrition:        -  Multivitamin PO daily - pt allergic to iodine       - Boost supplement       - Vitamin D 2000IU daily - home dose        - Ascorbic acid 500mg PO bid    - Supportive Care:       - acetaminophen 650mg PO Q6hr PRN fever/headache       - loperamide PRN viral diarrhea       - IVF if indicated, restrictive strategy preferred, no maintenance IV if able       - VTE PPx: enoxaparin 30 given low CrCl (13mL/min)    - Antibiotics:       - azithromycin 250mg po daily x 2 days - patient was prescribed Zpack on the 3rd       - can add ceftriaxone of there is further concern for concomitant bacterial PNA     - Remdesivir      - Dexamethasone 6mg daily x 10 days      Acute Hypoxemic Respiratory Failure    - Order RT consult via Respiratory Communication for COVID Protocols    - Incentive Spirometer Q4h, Flutter Valve Q4h    - Continuous Pulse Oximetry, goal SpO2 92-96%    - Supplemental O2 via LFNC, VentiMask, or HFNC (see Respiratory Support Oxygen Therapies)    - If wheezing, albuterol INH Q6h scheduled & PRN    - Proning Protocol if patient is a candidate (see  Proning Protocol)   - GCS >13, able to self-prone    - If deterioration, may warrant trial of NIPPV in neg pressure room or immediate ICU consult          CKD (chronic kidney disease) stage 4, GFR 15-29 ml/min  Baseline Cr noted to be ~1.6, most recently in the last 2 months Cr at 1.8    - Renally dose all medications to current GFR  - Avoid nephrotoxic meds/agents such as NSAIDs, gadolinium and IV radiocontrast  - Strict intake and output and daily standing weights  - Trend daily labs        Uncontrolled type 2 diabetes mellitus with hyperglycemia  HOME MED = linagliptin, metformin, Lantus 34U QHS  Hemoglobin A1C   Date Value Ref Range Status   11/16/2020 6.9 (H) 4.0 - 5.6 % Final     Comment:     ADA Screening Guidelines:  5.7-6.4%  Consistent with prediabetes  >or=6.5%  Consistent with diabetes  High levels of fetal hemoglobin interfere with the HbA1C  assay.  Heterozygous hemoglobin variants (HbS, HgC, etc)do  not significantly interfere with this assay.   However, presence of multiple variants may affect accuracy.            PLAN:  - continue 15U detemir QHS   - LDSSI, POCT glucose AC/HS  - diabetic diet  - now on dexamethasone will likely require more insulin in the coming days    Hypertension associated with diabetes  HOME MEDS = carvedilol 50 mg BID, irbesartan 300 mg qday, diltiazem 180 mg qday, chlorthalidone 12.5 mg qday, hydralazine 100 mg TID, clonidine patch 0.1 mg/24 h (taken weekly)     PLAN:  - hold chlorthalidone in context of COVID infection, continue other home medications  - on weekly clonidine patch      Coronary artery disease involving native coronary artery without angina pectoris - Non-obstructive 50% LAD  - home ASA  - continue home pravastatin      Asthma, chronic    - continue scheduled Albuterol q6h, Duo nebs PRN  - continue Breo (home Advair not on formulary)  - further treatment per covid protocol    VTE Risk Mitigation (From admission, onward)         Ordered     enoxaparin injection 30 mg  Every 24 hours      12/06/20 2211     IP VTE HIGH RISK PATIENT  Once      12/06/20 2211     Place sequential compression device  Until discontinued      12/06/20 2211                   Flores Rhodes MD  Department of Hospital Medicine   [facility] - ICU 14 [facility]

## 2020-12-07 NOTE — ED NOTES
Per receiving nurse, there are still no leads in the patient's room upstairs. Charge nurse notified.

## 2020-12-07 NOTE — ASSESSMENT & PLAN NOTE
HOME MEDS = carvedilol 50 mg BID, irbesartan 300 mg qday, diltiazem 180 mg qday, chlorthalidone 12.5 mg qday, hydralazine 100 mg TID, clonidine patch 0.1 mg/24 h (taken weekly)     PLAN:  - hold chlorthalidone in context of COVID infection, continue other home medications  - on weekly clonidine patch

## 2020-12-07 NOTE — ED NOTES
Report called to ADAM Goldman using SBAR. All questions answered. Per Susi, the room is not ready for the patient, they are missing monitor equipment.  Receiving nurse to call when room is ready.

## 2020-12-07 NOTE — PT/OT/SLP EVAL
Physical Therapy Evaluation    Patient Name:  Myesha Quinones   MRN:  [id number]    Recommendations:     Discharge Recommendations:  home with home health   Discharge Equipment Recommendations: none   Barriers to discharge: None    Assessment:     Myesha Quinones is a 80 y.o. female admitted with a medical diagnosis of Pneumonia due to COVID-19 virus.  She presents with the following impairments/functional limitations:  weakness, impaired endurance, impaired self care skills, impaired functional mobilty, gait instability, impaired balance, impaired cardiopulmonary response to activity . Patient tolerated session well. She appears to not be far from her baseline. Patient will benefit from PT services to address the mentioned deficits in order to promote an improve functional mobility status. Upon d/c, rec of HH in order for patient to progress towards an improved level of functional mobility independence.     Patient on 2L O2 throughout session. VSS, O2 >90%.    Rehab Prognosis: Good; patient would benefit from acute skilled PT services to address these deficits and reach maximum level of function.    Recent Surgery: * No surgery found *      Plan:     During this hospitalization, patient to be seen 3 x/week to address the identified rehab impairments via gait training, therapeutic activities, therapeutic exercises, neuromuscular re-education and progress toward the following goals:    · Plan of Care Expires:  01/06/21    History:     Past Medical History:   Diagnosis Date    Acute hypoxemic respiratory failure 12/19/2019    Acute right-sided thoracic back pain 12/9/2019    Allergy     Asthma     Basal cell carcinoma     left forehead    Basal cell carcinoma     left nose    Basal cell carcinoma 05/20/2015    right nose    Basal cell carcinoma 12/22/2015    left lower post neck    Basal cell carcinoma 12/03/2019    left ant scalp     Breast cancer     CAD (coronary artery disease)      Cardiomyopathy     Cardiomyopathy, ischemic     Cataract     CHF (congestive heart failure)     Chronic kidney disease, stage 3, mod decreased GFR 2/14/2017    Colon polyp 2011    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, with long-term current use of insulin 2/22/2018    COPD (chronic obstructive pulmonary disease)     COPD exacerbation 4/8/2018    Defibrillator discharge     Diabetes mellitus     Diabetes mellitus type II     Diabetes with neurologic complications     Goiter     MNG    HX: breast cancer     Hyperlipidemia     Hypertension     Iron deficiency anemia 5/16/2017    Left kidney mass     Meningioma     Osteoporosis, postmenopausal     Pacemaker     Pneumonia 12/8/2019    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    Pseudomonas pneumonia     Skin cancer     s/p excision    Sleep apnea     CPAP    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/2015    mid forehead    Unspecified vitamin D deficiency     Ventricular tachycardia     Vitamin B12 deficiency     Vitamin D deficiency disease        Past Surgical History:   Procedure Laterality Date    BASAL CELL CARCINOMA EXCISION      posterior neck and nose    BREAST BIOPSY      BREAST CYST EXCISION Left     BREAST SURGERY      CARDIAC DEFIBRILLATOR PLACEMENT      x 2    CATARACT EXTRACTION W/  INTRAOCULAR LENS IMPLANT Bilateral     CHOLECYSTECTOMY      COLONOSCOPY N/A 11/5/2019    Procedure: COLONOSCOPY;  Surgeon: Boaz Botello MD;  Location: [facility] ENDO ([address] [place] [address]);  Service: Endoscopy;  Laterality: N/A;  AICD - Medtronic -     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    FRACTURE SURGERY      left elbow and wrist as a child    HYSTERECTOMY      MASTECTOMY Right     REPLACEMENT OF IMPLANTABLE CARDIOVERTER-DEFIBRILLATOR (ICD) GENERATOR N/A 12/17/2018    Procedure: REPLACEMENT, ICD GENERATOR;  Surgeon: Jan Mckeon MD;  Location: [facility] EP LAB;  Service:  Cardiology;  Laterality: N/A;  DONNA, CRT-D gen change, MDT, MAC, SK, 3 Prep    REVISION OF SKIN POCKET FOR CARDIOVERTER-DEFIBRILLATOR  12/17/2018    Procedure: Revision, Skin Pocket, For Cardioverter-Defibrillator;  Surgeon: Jan Mckeon MD;  Location: [facility] EP LAB;  Service: Cardiology;;    SQUAMOUS CELL CARCINOMA EXCISION      remved from forehead    TONSILLECTOMY         Subjective     Chief Complaint: none   Patient/Family Comments/goals: to go home   Pain/Comfort:  · Pain Rating 1: 0/10  · Pain Rating Post-Intervention 1: 0/10    Patients cultural, spiritual, Temple conflicts given the current situation: no    Living Environment:  Patient's living environment is as follows:  Home type home   1 or 2 stories  [facility]   Number of TAMEKA/ rails 0 TAMEKA   AD used?/Owned?  RW   Bathroom set-up  Tub/shower   Working? no   Driving? yes   Individuals living with patient:  spouse   Hobbies/Roles: Going to the [facility]    Prior to admission, patients level of function was (I) for ADLs and mostly (I) for mobility with occ use of RW.  Equipment used at home: walker, rolling.  DME owned (not currently used): none.  Upon discharge, patient will have assistance from spouse, but currently also hospitalized with COVID.    Objective:     Communicated with RN prior to session.  Patient found HOB elevated with telemetry, pulse ox (continuous), blood pressure cuff, peripheral IV, PureWick  upon PT entry to room. See below for detailed functional assessment. Patient agreeable to participate in initial evaluation.    General Precautions: Standard, airborne, contact, droplet, fall   Orthopedic Precautions:N/A   Braces: N/A     Exams:  · Cognitive Exam:  Patient is oriented to Person, Place, Time and Situation  · Fine Motor Coordination:  Test:  Intact Impaired  Comments    Rapid ankle DF/PF  X     · Postural Exam:  Patient presented with the following abnormalities:    · -       Rounded shoulders  · -       Forward head  · Sensation:     LEFT LE: -       Intact  light/touch LE RIGHT LE:-       Intact  light/touch LE      ROM and Strength   Right Lower Extremity  Left Lower Extremity    Hip Flexion WFL WFL   Knee Extension  WFL WFL   Knee Flexion  WFL WFL   Ankle DF WFL WFL   Ankle PF  WFL WFL     Functional Mobility:  · Bed Mobility:     · Scooting: stand by assistance  · Supine to Sit: stand by assistance  · Transfers:     · Sit to Stand:  stand by assistance with no AD  · Gait: 20ft with SBA and no AD  ·  mildly unsteady, no LOB  ·  narrow JEROD, decreased sandra  · Balance:  sitting EOB - (I); static standing - SBA; dynamic standing - SBA     Therapeutic Activities and Exercises:  -Patient educated on the role and goal of PT services during acute care LOS. Question and concerns acknowledged and answered as appropriate.   -Will continue to educated as needed.    -White board updated in patients room to reflect level of assistance needed with nursing. RNx1 - SBA     AM-PAC 6 CLICK MOBILITY  Total Score:20     Patient left up in chair with all lines intact, call button in reach and RN present.  White board updated in patient room to reflect level of function with nursing.     GOALS:   Multidisciplinary Problems     Physical Therapy Goals        Problem: Physical Therapy Goal    Goal Priority Disciplines Outcome Goal Variances Interventions   Physical Therapy Goal     PT, PT/OT Ongoing, Progressing     Description: Goals to be met by: 20     Patient will increase functional independence with mobility by performin. Supine to sit with [place]  2. Sit to supine with [place]  3. Sit to stand transfer with Modified [place]  4. Gait  x 150 feet with Modified [place] using LRAD, if needed.                      Time Tracking:     PT Received On: 20  PT Start Time: 905     PT Stop Time: 931  PT Total Time (min): 26 min     Billable Minutes: Evaluation 10 and Gait Training 16      Adriana Mueller, MACKENZIE  2020

## 2020-12-07 NOTE — ASSESSMENT & PLAN NOTE
COVID-19 Virus Infection  Viral Pneumonia due to COVID-19  - COVID-19 testing: Collection Date: 12/1/2020 Collection Time:   9:22 AM   - Isolation: Airborne/Droplet. Surgical mask on patient. Notify Infection Control  - Diagnostics: Trend Q48hrs if stable, more frequently if patient decompensating       - CBC       - CMP       - Mg        - D-dimer       - Ferritin       - LDH    Lymphopenia, hyponatremia, hyperferritinemia, elevated troponin, elevated d-dimer, age, and medical comorbidities are significant predictors of poor clinical outcome    - Management: Per [facility] COVID Treatment Protocol    - Telemetry & Continuous Pulse Oximetry    - Nutrition:        - Multivitamin PO daily - pt allergic to iodine       - Boost supplement       - Vitamin D 2000IU daily - home dose        - Ascorbic acid 500mg PO bid    - Supportive Care:       - acetaminophen 650mg PO Q6hr PRN fever/headache       - loperamide PRN viral diarrhea       - IVF if indicated, restrictive strategy preferred, no maintenance IV if able       - VTE PPx: enoxaparin 30 given low CrCl (13mL/min)    - Antibiotics:       - azithromycin 250mg po daily x 2 days - patient was prescribed Zpack on the 3rd       - can add ceftriaxone of there is further concern for concomitant bacterial PNA     - Remdesivir      - Dexamethasone 6mg daily x 6 days      Acute Hypoxemic Respiratory Failure    - Order RT consult via Respiratory Communication for COVID Protocols    - Incentive Spirometer Q4h, Flutter Valve Q4h    - Continuous Pulse Oximetry, goal SpO2 92-96%    - Supplemental O2 via LFNC, VentiMask, or HFNC (see Respiratory Support Oxygen Therapies)    - If wheezing, albuterol INH Q6h scheduled & PRN    - Proning Protocol if patient is a candidate (see  Proning Protocol)   - GCS >13, able to self-prone    - If deterioration, may warrant trial of NIPPV in neg pressure room or immediate ICU consult

## 2020-12-07 NOTE — PLAN OF CARE
Pt alert and oriented x4. VSS. O2 decreased from 8L mask to 2L NC with O2 sats 94-97%. Rested well without complaints of pain or discomfort. Safety maintained. Will continue to monitor.

## 2020-12-07 NOTE — SUBJECTIVE & OBJECTIVE
Past Medical History:   Diagnosis Date    Acute hypoxemic respiratory failure 12/19/2019    Acute right-sided thoracic back pain 12/9/2019    Allergy     Asthma     Basal cell carcinoma     left forehead    Basal cell carcinoma     left nose    Basal cell carcinoma 05/20/2015    right nose    Basal cell carcinoma 12/22/2015    left lower post neck    Basal cell carcinoma 12/03/2019    left ant scalp     Breast cancer     CAD (coronary artery disease)     Cardiomyopathy     Cardiomyopathy, ischemic     Cataract     CHF (congestive heart failure)     Chronic kidney disease, stage 3, mod decreased GFR 2/14/2017    Colon polyp 2011    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, with long-term current use of insulin 2/22/2018    COPD (chronic obstructive pulmonary disease)     COPD exacerbation 4/8/2018    Defibrillator discharge     Diabetes mellitus     Diabetes mellitus type II     Diabetes with neurologic complications     Goiter     MNG    HX: breast cancer     Hyperlipidemia     Hypertension     Iron deficiency anemia 5/16/2017    Left kidney mass     Meningioma     Osteoporosis, postmenopausal     Pacemaker     Pneumonia 12/8/2019    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    Pseudomonas pneumonia     Skin cancer     s/p excision    Sleep apnea     CPAP    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/2015    mid forehead    Unspecified vitamin D deficiency     Ventricular tachycardia     Vitamin B12 deficiency     Vitamin D deficiency disease        Past Surgical History:   Procedure Laterality Date    BASAL CELL CARCINOMA EXCISION      posterior neck and nose    BREAST BIOPSY      BREAST CYST EXCISION Left     BREAST SURGERY      CARDIAC DEFIBRILLATOR PLACEMENT      x 2    CATARACT EXTRACTION W/  INTRAOCULAR LENS IMPLANT Bilateral     CHOLECYSTECTOMY      COLONOSCOPY N/A 11/5/2019    Procedure:  COLONOSCOPY;  Surgeon: Boaz Botello MD;  Location: [facility] ENDO (4TH FLR);  Service: Endoscopy;  Laterality: N/A;  AICD - Medtronic - sm    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    FRACTURE SURGERY      left elbow and wrist as a child    HYSTERECTOMY      MASTECTOMY Right     REPLACEMENT OF IMPLANTABLE CARDIOVERTER-DEFIBRILLATOR (ICD) GENERATOR N/A 12/17/2018    Procedure: REPLACEMENT, ICD GENERATOR;  Surgeon: Jan Mckeon MD;  Location: [facility] EP LAB;  Service: Cardiology;  Laterality: N/A;  DONNA, CRT-D gen change, MDT, MAC, SK, 3 Prep    REVISION OF SKIN POCKET FOR CARDIOVERTER-DEFIBRILLATOR  12/17/2018    Procedure: Revision, Skin Pocket, For Cardioverter-Defibrillator;  Surgeon: Jan Mckeon MD;  Location: [facility] EP LAB;  Service: Cardiology;;    SQUAMOUS CELL CARCINOMA EXCISION      remved from forehead    TONSILLECTOMY         Review of patient's allergies indicates:   Allergen Reactions    Iodine and iodide containing products Hives    Nifedipine      weakness       Current Facility-Administered Medications on File Prior to Encounter   Medication    0.9%  NaCl infusion    fluorescein 500 mg/5 mL (10 %) injection 500 mg     Current Outpatient Medications on File Prior to Encounter   Medication Sig    albuterol (VENTOLIN HFA) 90 mcg/actuation inhaler INHALE 2 PUFFS INTO THE LUNGS EVERY 6 (SIX) HOURS AS NEEDED FOR WHEEZING. RESCUE    albuterol-ipratropium (DUO-NEB) 2.5 mg-0.5 mg/3 mL nebulizer solution TAKE 1 VIAL BY NEBULIZATION EVERY 4 (FOUR) HOURS. RESCUE    amoxicillin-clavulanate 875-125mg (AUGMENTIN) 875-125 mg per tablet Take 1 tablet by mouth 2 (two) times daily.    aspirin (ENTERIC COATED ASPIRIN) 81 MG EC tablet Take 1 tablet by mouth once daily.     azithromycin (Z-ERYN) 250 MG tablet 2 initially then one daily    benzonatate (TESSALON PERLES) 100 MG capsule Take 2 capsules (200 mg total) by mouth 3 (three) times daily as needed for Cough.    blood sugar diagnostic (ACCU-CHEK  HECTOR) [facility] Uses Accu-Check Hector meter to test BG 4x/day    carvediloL (COREG) 25 MG tablet TAKE 2 TABLETS (50 MG TOTAL) BY MOUTH 2 (TWO) TIMES DAILY.    chlorthalidone (HYGROTEN) 25 MG Tab Take 0.5 tablets (12.5 mg total) by mouth once daily.    cholecalciferol, vitamin D3, (VITAMIN D3) 2,000 unit Tab Take 1 tablet by mouth once daily.     cloNIDine 0.1 mg/24 hr td ptwk (CATAPRES) 0.1 mg/24 hr PLACE 1 PATCH ONTO THE SKIN EVERY 7 DAYS.    CYANOCOBALAMIN, VITAMIN B-12, (B-12 DOTS ORAL) Take 1 tablet by mouth once daily.    diltiaZEM (CARDIZEM CD) 180 MG 24 hr capsule Take 1 capsule (180 mg total) by mouth once daily.    doxycycline (VIBRAMYCIN) 100 MG Cap Take 1 capsule (100 mg total) by mouth every 12 (twelve) hours.    fluticasone propionate (FLONASE) 50 mcg/actuation nasal spray 2 sprays (100 mcg total) by Each Nostril route once daily.    hydrALAZINE (APRESOLINE) 100 MG tablet Take 1 tablet (100 mg total) by mouth 3 (three) times daily.    insulin (LANTUS SOLOSTAR U-100 INSULIN) glargine 100 units/mL (3mL) SubQ pen Inject 34 Units into the skin every evening. Cancel the 30 unit prescription    irbesartan (AVAPRO) 300 MG tablet Take 1 tablet (300 mg total) by mouth every evening.    lancets (ACCU-CHEK SOFTCLIX LANCETS) Misc Uses Accu-Chek Hector meter to test BG 4x/day (Patient taking differently: Uses Accu-Chek Hector meter to test BG 1x/day)    levocetirizine (XYZAL) 5 MG tablet Take 1 tablet (5 mg total) by mouth every evening.    linaGLIPtin (TRADJENTA) 5 mg Tab tablet Take 1 tablet (5 mg total) by mouth once daily.    magnesium oxide (MAG-OX) 400 mg tablet Take 1 tablet (400 mg total) by mouth once daily.    metFORMIN (GLUCOPHAGE) 500 MG tablet Take 1 tablet (500 mg total) by mouth 2 (two) times daily with meals.    montelukast (SINGULAIR) 10 mg tablet Take 1 tablet (10 mg total) by mouth every evening.    nitroGLYCERIN (NITROSTAT) 0.4 MG SL tablet Place 0.4 mg under the tongue every 5 (five)  "minutes as needed for Chest pain.     omega-3 fatty acids (FISH OIL) 500 mg Cap Take 1 capsule by mouth Twice daily.    pravastatin (PRAVACHOL) 40 MG tablet TAKE 1 TABLET BY MOUTH EVERY DAY    acetaminophen (TYLENOL) 500 MG tablet Take 1,000 mg by mouth daily as needed for Pain.    fluticasone-salmeterol diskus inhaler 500-50 mcg Inhale 1 puff into the lungs 2 (two) times daily. Controller    pen needle, diabetic (BD ULTRA-FINE MINI PEN NEEDLE) 31 gauge x 3/16" Ndle USE WITH LANTUS AT BEDTIME     Family History     Problem Relation (Age of Onset)    Asthma Mother    Cancer Brother, Son, Daughter    Diabetes Father, Sister, Brother, Brother, Brother, Brother, Son, Daughter, Son    Heart disease Father, Sister, Brother, Brother, Brother    Hypertension Brother, Brother, Mother    Melanoma Daughter    No Known Problems Maternal Grandmother, Maternal Grandfather, Paternal Grandmother, Paternal Grandfather    Obesity Daughter    Stroke Mother        Tobacco Use    Smoking status: Never Smoker    Smokeless tobacco: Never Used   Substance and Sexual Activity    Alcohol use: No     Alcohol/week: 0.0 standard drinks    Drug use: No    Sexual activity: Yes     Partners: Male     Review of Systems   Constitutional: Positive for chills, fatigue and fever.   HENT: Negative for congestion, rhinorrhea and sore throat.    Eyes: Negative for visual disturbance.   Respiratory: Positive for cough, shortness of breath and wheezing.    Cardiovascular: Negative for chest pain, palpitations and leg swelling.   Gastrointestinal: Positive for diarrhea. Negative for abdominal pain, nausea and vomiting.   Genitourinary: Negative for difficulty urinating, flank pain and hematuria.   Musculoskeletal: Negative for myalgias.   Skin: Negative for rash.   Neurological: Negative for light-headedness and headaches.     Objective:     Vital Signs (Most Recent):  Temp: 98.2 °F (36.8 °C) (12/06/20 2103)  Pulse: 88 (12/06/20 2103)  Resp: 18 " (12/06/20 2103)  BP: (!) 177/79 (12/06/20 2103)  SpO2: 99 % (12/06/20 2103) Vital Signs (24h Range):  Temp:  [98.2 °F (36.8 °C)-98.4 °F (36.9 °C)] 98.2 °F (36.8 °C)  Pulse:  [69-88] 88  Resp:  [18-20] 18  SpO2:  [99 %-100 %] 99 %  BP: (161-177)/(67-88) 177/79        There is no height or weight on file to calculate BMI.    Physical Exam  Constitutional:       Appearance: Normal appearance.   HENT:      Head: Normocephalic and atraumatic.      Nose: Nose normal. No congestion or rhinorrhea.      Mouth/Throat:      Mouth: Mucous membranes are moist.      Pharynx: Oropharynx is clear.   Eyes:      Extraocular Movements: Extraocular movements intact.      Conjunctiva/sclera: Conjunctivae normal.      Pupils: Pupils are equal, round, and reactive to light.   Neck:      Musculoskeletal: Normal range of motion and neck supple.   Cardiovascular:      Rate and Rhythm: Normal rate and regular rhythm.      Heart sounds: No murmur.      Comments: lower extremity mild pitting edema L>R  Pulmonary:      Effort: Respiratory distress present.      Breath sounds: No stridor. Wheezing present. No rhonchi.   Abdominal:      General: Abdomen is flat. There is no distension.      Palpations: Abdomen is soft.      Tenderness: There is no abdominal tenderness.   Musculoskeletal: Normal range of motion.         General: No swelling, deformity or signs of injury.   Skin:     General: Skin is warm and dry.      Capillary Refill: Capillary refill takes less than 2 seconds.   Neurological:      General: No focal deficit present.      Mental Status: She is alert and oriented to person, place, and time.           CRANIAL NERVES     CN III, IV, VI   Pupils are equal, round, and reactive to light.       Significant Labs:   CBC:   Recent Labs   Lab 12/06/20 1852   WBC 10.13   HGB 9.8*   HCT 31.3*        CMP:   Recent Labs   Lab 12/06/20 1852   *   K 4.3      CO2 21*   *   BUN 26*   CREATININE 1.8*   CALCIUM 8.7   PROT  6.9   ALBUMIN 2.8*   BILITOT 0.2   ALKPHOS 86   AST 22   ALT 16   ANIONGAP 10   EGFRNONAA 26.0*     Lactic Acid:   Recent Labs   Lab 12/06/20  1852   LACTATE 2.0     Troponin:   Recent Labs   Lab 12/06/20 1852   TROPONINI 0.022       Recent Labs     12/06/20 1852   CRP 41.0*   FERRITIN 53        Component      Latest Ref Rng & Units 12/1/2020   SARS-CoV2 (COVID-19) Qualitative PCR      Not Detected Detected (A)       Significant Imaging:   X-Ray Chest AP Portable  Narrative: EXAMINATION:  XR CHEST AP PORTABLE    CLINICAL HISTORY:  Suspected Covid-19 Virus Infection;    TECHNIQUE:  Single frontal view of the chest was performed.    COMPARISON:  Chest radiograph 11/17/2020    FINDINGS:  Monitoring leads and tubing overlie the chest.  Patient is slightly rotated.    Left chest multi lead cardiac device is stable.  Cardiomediastinal silhouette is midline and stable without evidence of failure.  Scattered linear opacities consistent with subsegmental scarring versus atelectasis.  The lungs are symmetrically well expanded grossly similar minimal nonspecific interstitial coarsening consistent with chronic interstitial lung changes.  No large consolidation or new focal opacity.  No pleural effusion or pneumothorax.  Hilar contours are unchanged.  Osseous structures appear stable without acute process seen.  PA and lateral views can be obtained.  Impression: Cardiac device and grossly stable chronic findings without radiographic evidence of failure, new focal consolidation, or other source of cough/shortness of breath, noting that early/mild viral pneumonia may be radiographically occult.    Electronically signed by: Boaz Ayon MD  Date:    12/06/2020  Time:    19:18

## 2020-12-07 NOTE — ASSESSMENT & PLAN NOTE
HOME MED = linagliptin, metformin, Lantus 34U QHS  Hemoglobin A1C   Date Value Ref Range Status   11/16/2020 6.9 (H) 4.0 - 5.6 % Final     Comment:     ADA Screening Guidelines:  5.7-6.4%  Consistent with prediabetes  >or=6.5%  Consistent with diabetes  High levels of fetal hemoglobin interfere with the HbA1C  assay. Heterozygous hemoglobin variants (HbS, HgC, etc)do  not significantly interfere with this assay.   However, presence of multiple variants may affect accuracy.            PLAN:  - continue 15U detemir QHS   - LDSSI, POCT glucose AC/HS  - diabetic diet  - now on dexamethasone will likely require more insulin in the coming days

## 2020-12-08 LAB
ALBUMIN SERPL BCP-MCNC: 2.4 G/DL (ref 3.5–5.2)
ALP SERPL-CCNC: 70 U/L (ref 55–135)
ALT SERPL W/O P-5'-P-CCNC: 14 U/L (ref 10–44)
ANION GAP SERPL CALC-SCNC: 12 MMOL/L (ref 8–16)
AST SERPL-CCNC: 19 U/L (ref 10–40)
BASOPHILS # BLD AUTO: 0.01 K/UL (ref 0–0.2)
BASOPHILS NFR BLD: 0.2 % (ref 0–1.9)
BILIRUB SERPL-MCNC: 0.2 MG/DL (ref 0.1–1)
BUN SERPL-MCNC: 40 MG/DL (ref 8–23)
CALCIUM SERPL-MCNC: 8.3 MG/DL (ref 8.7–10.5)
CHLORIDE SERPL-SCNC: 99 MMOL/L (ref 95–110)
CO2 SERPL-SCNC: 18 MMOL/L (ref 23–29)
CREAT SERPL-MCNC: 1.8 MG/DL (ref 0.5–1.4)
CRP SERPL-MCNC: 77.7 MG/L (ref 0–8.2)
D DIMER PPP IA.FEU-MCNC: 1.57 MG/L FEU
DIFFERENTIAL METHOD: ABNORMAL
EOSINOPHIL # BLD AUTO: 0 K/UL (ref 0–0.5)
EOSINOPHIL NFR BLD: 0 % (ref 0–8)
ERYTHROCYTE [DISTWIDTH] IN BLOOD BY AUTOMATED COUNT: 13.1 % (ref 11.5–14.5)
EST. GFR  (AFRICAN AMERICAN): 30 ML/MIN/1.73 M^2
EST. GFR  (NON AFRICAN AMERICAN): 26 ML/MIN/1.73 M^2
FERRITIN SERPL-MCNC: 95 NG/ML (ref 20–300)
GLUCOSE SERPL-MCNC: 278 MG/DL (ref 70–110)
HCT VFR BLD AUTO: 29.4 % (ref 37–48.5)
HGB BLD-MCNC: 9.1 G/DL (ref 12–16)
IMM GRANULOCYTES # BLD AUTO: 0.05 K/UL (ref 0–0.04)
IMM GRANULOCYTES NFR BLD AUTO: 0.9 % (ref 0–0.5)
LDH SERPL L TO P-CCNC: 169 U/L (ref 110–260)
LYMPHOCYTES # BLD AUTO: 1.1 K/UL (ref 1–4.8)
LYMPHOCYTES NFR BLD: 20.1 % (ref 18–48)
MAGNESIUM SERPL-MCNC: 1.7 MG/DL (ref 1.6–2.6)
MCH RBC QN AUTO: 27.8 PG (ref 27–31)
MCHC RBC AUTO-ENTMCNC: 31 G/DL (ref 32–36)
MCV RBC AUTO: 90 FL (ref 82–98)
MONOCYTES # BLD AUTO: 0.4 K/UL (ref 0.3–1)
MONOCYTES NFR BLD: 6.4 % (ref 4–15)
NEUTROPHILS # BLD AUTO: 4 K/UL (ref 1.8–7.7)
NEUTROPHILS NFR BLD: 72.4 % (ref 38–73)
NRBC BLD-RTO: 0 /100 WBC
PHOSPHATE SERPL-MCNC: 4.8 MG/DL (ref 2.7–4.5)
PLATELET # BLD AUTO: 182 K/UL (ref 150–350)
PMV BLD AUTO: 11.5 FL (ref 9.2–12.9)
POCT GLUCOSE: 277 MG/DL (ref 70–110)
POCT GLUCOSE: 381 MG/DL (ref 70–110)
POCT GLUCOSE: 390 MG/DL (ref 70–110)
POCT GLUCOSE: 436 MG/DL (ref 70–110)
POCT GLUCOSE: 455 MG/DL (ref 70–110)
POTASSIUM SERPL-SCNC: 4.3 MMOL/L (ref 3.5–5.1)
PROT SERPL-MCNC: 6.3 G/DL (ref 6–8.4)
RBC # BLD AUTO: 3.27 M/UL (ref 4–5.4)
SODIUM SERPL-SCNC: 129 MMOL/L (ref 136–145)
WBC # BLD AUTO: 5.47 K/UL (ref 3.9–12.7)

## 2020-12-08 PROCEDURE — 80053 COMPREHEN METABOLIC PANEL: CPT | Mod: HCNC

## 2020-12-08 PROCEDURE — 63600175 PHARM REV CODE 636 W HCPCS: Mod: HCNC | Performed by: STUDENT IN AN ORGANIZED HEALTH CARE EDUCATION/TRAINING PROGRAM

## 2020-12-08 PROCEDURE — 99900035 HC TECH TIME PER 15 MIN (STAT): Mod: HCNC

## 2020-12-08 PROCEDURE — 94640 AIRWAY INHALATION TREATMENT: CPT | Mod: HCNC

## 2020-12-08 PROCEDURE — 25000003 PHARM REV CODE 250: Mod: HCNC | Performed by: STUDENT IN AN ORGANIZED HEALTH CARE EDUCATION/TRAINING PROGRAM

## 2020-12-08 PROCEDURE — 63700000 PHARM REV CODE 250 ALT 637 W/O HCPCS: Mod: HCNC | Performed by: STUDENT IN AN ORGANIZED HEALTH CARE EDUCATION/TRAINING PROGRAM

## 2020-12-08 PROCEDURE — 25000242 PHARM REV CODE 250 ALT 637 W/ HCPCS: Mod: HCNC | Performed by: STUDENT IN AN ORGANIZED HEALTH CARE EDUCATION/TRAINING PROGRAM

## 2020-12-08 PROCEDURE — C9399 UNCLASSIFIED DRUGS OR BIOLOG: HCPCS | Mod: HCNC | Performed by: STUDENT IN AN ORGANIZED HEALTH CARE EDUCATION/TRAINING PROGRAM

## 2020-12-08 PROCEDURE — 83735 ASSAY OF MAGNESIUM: CPT | Mod: HCNC

## 2020-12-08 PROCEDURE — 94664 DEMO&/EVAL PT USE INHALER: CPT | Mod: HCNC

## 2020-12-08 PROCEDURE — 85379 FIBRIN DEGRADATION QUANT: CPT | Mod: HCNC

## 2020-12-08 PROCEDURE — 82728 ASSAY OF FERRITIN: CPT | Mod: HCNC

## 2020-12-08 PROCEDURE — 83615 LACTATE (LD) (LDH) ENZYME: CPT | Mod: HCNC

## 2020-12-08 PROCEDURE — 84100 ASSAY OF PHOSPHORUS: CPT | Mod: HCNC

## 2020-12-08 PROCEDURE — 99233 PR SUBSEQUENT HOSPITAL CARE,LEVL III: ICD-10-PCS | Mod: HCNC,GC,, | Performed by: INTERNAL MEDICINE

## 2020-12-08 PROCEDURE — 36415 COLL VENOUS BLD VENIPUNCTURE: CPT | Mod: HCNC

## 2020-12-08 PROCEDURE — 11000001 HC ACUTE MED/SURG PRIVATE ROOM: Mod: HCNC

## 2020-12-08 PROCEDURE — 86140 C-REACTIVE PROTEIN: CPT | Mod: HCNC

## 2020-12-08 PROCEDURE — 99233 SBSQ HOSP IP/OBS HIGH 50: CPT | Mod: HCNC,GC,, | Performed by: INTERNAL MEDICINE

## 2020-12-08 PROCEDURE — 27000221 HC OXYGEN, UP TO 24 HOURS: Mod: HCNC

## 2020-12-08 PROCEDURE — 85025 COMPLETE CBC W/AUTO DIFF WBC: CPT | Mod: HCNC

## 2020-12-08 PROCEDURE — 94761 N-INVAS EAR/PLS OXIMETRY MLT: CPT | Mod: HCNC

## 2020-12-08 RX ORDER — INSULIN ASPART 100 [IU]/ML
1-10 INJECTION, SOLUTION INTRAVENOUS; SUBCUTANEOUS
Status: DISCONTINUED | OUTPATIENT
Start: 2020-12-08 | End: 2020-12-11

## 2020-12-08 RX ORDER — INSULIN ASPART 100 [IU]/ML
5 INJECTION, SOLUTION INTRAVENOUS; SUBCUTANEOUS ONCE
Status: COMPLETED | OUTPATIENT
Start: 2020-12-08 | End: 2020-12-08

## 2020-12-08 RX ORDER — IPRATROPIUM BROMIDE AND ALBUTEROL SULFATE 2.5; .5 MG/3ML; MG/3ML
3 SOLUTION RESPIRATORY (INHALATION) ONCE
Status: DISCONTINUED | OUTPATIENT
Start: 2020-12-08 | End: 2020-12-12 | Stop reason: HOSPADM

## 2020-12-08 RX ORDER — INSULIN ASPART 100 [IU]/ML
10 INJECTION, SOLUTION INTRAVENOUS; SUBCUTANEOUS ONCE
Status: COMPLETED | OUTPATIENT
Start: 2020-12-08 | End: 2020-12-08

## 2020-12-08 RX ORDER — INSULIN ASPART 100 [IU]/ML
4 INJECTION, SOLUTION INTRAVENOUS; SUBCUTANEOUS
Status: DISCONTINUED | OUTPATIENT
Start: 2020-12-08 | End: 2020-12-08

## 2020-12-08 RX ORDER — INSULIN ASPART 100 [IU]/ML
9 INJECTION, SOLUTION INTRAVENOUS; SUBCUTANEOUS
Status: DISCONTINUED | OUTPATIENT
Start: 2020-12-08 | End: 2020-12-09

## 2020-12-08 RX ADMIN — ALBUTEROL SULFATE 2 PUFF: 90 AEROSOL, METERED RESPIRATORY (INHALATION) at 08:12

## 2020-12-08 RX ADMIN — MUPIROCIN: 20 OINTMENT TOPICAL at 09:12

## 2020-12-08 RX ADMIN — INSULIN ASPART 4 UNITS: 100 INJECTION, SOLUTION INTRAVENOUS; SUBCUTANEOUS at 11:12

## 2020-12-08 RX ADMIN — DILTIAZEM HYDROCHLORIDE 180 MG: 180 CAPSULE, COATED, EXTENDED RELEASE ORAL at 11:12

## 2020-12-08 RX ADMIN — ALBUTEROL SULFATE 2 PUFF: 90 AEROSOL, METERED RESPIRATORY (INHALATION) at 07:12

## 2020-12-08 RX ADMIN — INSULIN DETEMIR 20 UNITS: 100 INJECTION, SOLUTION SUBCUTANEOUS at 09:12

## 2020-12-08 RX ADMIN — DEXAMETHASONE 6 MG: 4 TABLET ORAL at 08:12

## 2020-12-08 RX ADMIN — INSULIN DETEMIR 8 UNITS: 100 INJECTION, SOLUTION SUBCUTANEOUS at 04:12

## 2020-12-08 RX ADMIN — Medication 2000 UNITS: at 08:12

## 2020-12-08 RX ADMIN — MUPIROCIN: 20 OINTMENT TOPICAL at 08:12

## 2020-12-08 RX ADMIN — ALBUTEROL SULFATE 2 PUFF: 90 AEROSOL, METERED RESPIRATORY (INHALATION) at 12:12

## 2020-12-08 RX ADMIN — INSULIN DETEMIR 10 UNITS: 100 INJECTION, SOLUTION SUBCUTANEOUS at 02:12

## 2020-12-08 RX ADMIN — ASPIRIN 81 MG: 81 TABLET, COATED ORAL at 08:12

## 2020-12-08 RX ADMIN — IRBESARTAN 300 MG: 150 TABLET ORAL at 09:12

## 2020-12-08 RX ADMIN — INSULIN ASPART 5 UNITS: 100 INJECTION, SOLUTION INTRAVENOUS; SUBCUTANEOUS at 06:12

## 2020-12-08 RX ADMIN — HYDRALAZINE HYDROCHLORIDE 100 MG: 50 TABLET, FILM COATED ORAL at 02:12

## 2020-12-08 RX ADMIN — INSULIN ASPART 10 UNITS: 100 INJECTION, SOLUTION INTRAVENOUS; SUBCUTANEOUS at 04:12

## 2020-12-08 RX ADMIN — INSULIN ASPART 9 UNITS: 100 INJECTION, SOLUTION INTRAVENOUS; SUBCUTANEOUS at 04:12

## 2020-12-08 RX ADMIN — CARVEDILOL 50 MG: 25 TABLET, FILM COATED ORAL at 08:12

## 2020-12-08 RX ADMIN — IPRATROPIUM BROMIDE AND ALBUTEROL SULFATE 3 ML: .5; 2.5 SOLUTION RESPIRATORY (INHALATION) at 04:12

## 2020-12-08 RX ADMIN — ENOXAPARIN SODIUM 30 MG: 100 INJECTION SUBCUTANEOUS at 05:12

## 2020-12-08 RX ADMIN — REMDESIVIR 100 MG: 100 INJECTION, POWDER, LYOPHILIZED, FOR SOLUTION INTRAVENOUS at 04:12

## 2020-12-08 RX ADMIN — FLUTICASONE FUROATE AND VILANTEROL TRIFENATATE 1 PUFF: 200; 25 POWDER RESPIRATORY (INHALATION) at 09:12

## 2020-12-08 RX ADMIN — INSULIN ASPART 5 UNITS: 100 INJECTION, SOLUTION INTRAVENOUS; SUBCUTANEOUS at 09:12

## 2020-12-08 RX ADMIN — INSULIN ASPART 3 UNITS: 100 INJECTION, SOLUTION INTRAVENOUS; SUBCUTANEOUS at 08:12

## 2020-12-08 RX ADMIN — MONTELUKAST 10 MG: 10 TABLET, FILM COATED ORAL at 09:12

## 2020-12-08 RX ADMIN — AZITHROMYCIN MONOHYDRATE 500 MG: 250 TABLET ORAL at 08:12

## 2020-12-08 RX ADMIN — PRAVASTATIN SODIUM 40 MG: 40 TABLET ORAL at 08:12

## 2020-12-08 RX ADMIN — HYDRALAZINE HYDROCHLORIDE 100 MG: 50 TABLET, FILM COATED ORAL at 08:12

## 2020-12-08 RX ADMIN — Medication 500 MG: at 08:12

## 2020-12-08 RX ADMIN — HYDRALAZINE HYDROCHLORIDE 100 MG: 50 TABLET, FILM COATED ORAL at 09:12

## 2020-12-08 RX ADMIN — Medication 500 MG: at 09:12

## 2020-12-08 RX ADMIN — CETIRIZINE HYDROCHLORIDE 5 MG: 5 TABLET ORAL at 09:12

## 2020-12-08 RX ADMIN — CARVEDILOL 50 MG: 25 TABLET, FILM COATED ORAL at 09:12

## 2020-12-08 RX ADMIN — INSULIN ASPART 5 UNITS: 100 INJECTION, SOLUTION INTRAVENOUS; SUBCUTANEOUS at 11:12

## 2020-12-08 NOTE — SUBJECTIVE & OBJECTIVE
Interval History: NAEON. Remains stable on 1-2L of O2 via NC.     Review of Systems   Constitutional: Negative for chills, fatigue and fever.   HENT: Negative for congestion, rhinorrhea and sore throat.    Eyes: Negative for visual disturbance.   Respiratory: Positive for cough. Negative for shortness of breath and wheezing.    Cardiovascular: Negative for chest pain, palpitations and leg swelling.   Gastrointestinal: Negative for abdominal pain, diarrhea, nausea and vomiting.   Genitourinary: Negative for difficulty urinating, flank pain and hematuria.   Musculoskeletal: Negative for myalgias.   Skin: Negative for rash.   Neurological: Negative for light-headedness and headaches.     Objective:     Vital Signs (Most Recent):  Temp: 97.7 °F (36.5 °C) (12/08/20 0740)  Pulse: 69 (12/08/20 1446)  Resp: 20 (12/08/20 0900)  BP: (!) 166/77 (12/08/20 0740)  SpO2: 97 % (12/08/20 1446) Vital Signs (24h Range):  Temp:  [97.7 °F (36.5 °C)-98.4 °F (36.9 °C)] 97.7 °F (36.5 °C)  Pulse:  [69-94] 69  Resp:  [16-32] 20  SpO2:  [94 %-97 %] 97 %  BP: (134-175)/(61-77) 166/77     Weight: 68.7 kg (151 lb 7.3 oz)  Body mass index is 26.83 kg/m².    Intake/Output Summary (Last 24 hours) at 12/8/2020 1527  Last data filed at 12/8/2020 0700  Gross per 24 hour   Intake 240 ml   Output 1400 ml   Net -1160 ml      Physical Exam  Constitutional:       Appearance: Normal appearance.   HENT:      Head: Normocephalic and atraumatic.      Nose: Nose normal. No congestion or rhinorrhea.      Mouth/Throat:      Mouth: Mucous membranes are moist.      Pharynx: Oropharynx is clear.   Eyes:      Extraocular Movements: Extraocular movements intact.      Conjunctiva/sclera: Conjunctivae normal.      Pupils: Pupils are equal, round, and reactive to light.   Neck:      Musculoskeletal: Normal range of motion and neck supple.   Cardiovascular:      Rate and Rhythm: Normal rate and regular rhythm.      Heart sounds: No murmur.   Pulmonary:      Effort: No  respiratory distress.      Breath sounds: No stridor. Wheezing present. No rhonchi.   Abdominal:      General: Abdomen is flat. There is no distension.      Palpations: Abdomen is soft.      Tenderness: There is no abdominal tenderness.   Musculoskeletal: Normal range of motion.         General: No swelling, deformity or signs of injury.   Skin:     General: Skin is warm and dry.      Capillary Refill: Capillary refill takes less than 2 seconds.   Neurological:      General: No focal deficit present.      Mental Status: She is alert and oriented to person, place, and time.         Significant Labs:   Blood Culture:   Recent Labs   Lab 12/07/20  0600 12/07/20  0604   LABBLOO No Growth to date  No Growth to date No Growth to date  No Growth to date     BMP:   Recent Labs   Lab 12/08/20  0348   *   *   K 4.3   CL 99   CO2 18*   BUN 40*   CREATININE 1.8*   CALCIUM 8.3*   MG 1.7     CBC:   Recent Labs   Lab 12/06/20  1852 12/07/20  0604 12/08/20  0348   WBC 10.13 6.74 5.47   HGB 9.8* 8.5* 9.1*   HCT 31.3* 27.8* 29.4*    190 182     CMP:   Recent Labs   Lab 12/06/20  1852 12/07/20  0604 12/08/20  0348   * 133* 129*   K 4.3 3.8 4.3    103 99   CO2 21* 21* 18*   * 69* 278*   BUN 26* 24* 40*   CREATININE 1.8* 1.6* 1.8*   CALCIUM 8.7 8.1* 8.3*   PROT 6.9 6.0 6.3   ALBUMIN 2.8* 2.5* 2.4*   BILITOT 0.2 0.2 0.2   ALKPHOS 86 73 70   AST 22 18 19   ALT 16 14 14   ANIONGAP 10 9 12   EGFRNONAA 26.0* 30.0* 26.0*       Significant Imaging: I have reviewed all pertinent imaging results/findings within the past 24 hours.

## 2020-12-08 NOTE — PROGRESS NOTES
[facility] - ICU 14 [facility] Medicine  Progress Note    Patient Name: Myesha Quinones  MRN: [id number]  Patient Class: IP- Inpatient   Admission Date: 12/6/2020  Length of Stay: 2 days  Attending Physician: Morro Porter MD  Primary Care Provider: Emelina Gaston MD        Subjective:     Principal Problem:Pneumonia due to COVID-19 virus        HPI:  This is an 80 year old female with pmhx of Asthma, CAD, ischemic cardiomyopathy, CHF, CKD3, insulin dependent T2DM,  breast cancer, HTN, HLD who was tested positive for COVID after her daughter and  had tested positive for it last week. She was initially sent home with normal vitals and examination. She was diagnosed on the 1st of this month. Today she presents with fevers, increased fatigue, loose stools, and dyspnea. She has a chronic cough with occasional green tinged sputum but is now having clear sputum. She called EMS and was found to be satting in the low 80's and responded to oxygen supplementation. Otherwise she does not have any other complaints. Denies any chest pain, headache, nausea, vomiting.    In the ED she was placed on 8L NC with saturations ~99%, hypertensive 161/67, afebrile. Laboratory evaluation revealed chronic anemia, mild hyponatremia. Imaging with x-ray of the chest revealed stable findings.    Overview/Hospital Course:  No notes on file    Interval History: NAEON. Remains stable on 1-2L of O2 via NC.     Review of Systems   Constitutional: Negative for chills, fatigue and fever.   HENT: Negative for congestion, rhinorrhea and sore throat.    Eyes: Negative for visual disturbance.   Respiratory: Positive for cough. Negative for shortness of breath and wheezing.    Cardiovascular: Negative for chest pain, palpitations and leg swelling.   Gastrointestinal: Negative for abdominal pain, diarrhea, nausea and vomiting.   Genitourinary: Negative for difficulty urinating, flank pain and hematuria.   Musculoskeletal:  Negative for myalgias.   Skin: Negative for rash.   Neurological: Negative for light-headedness and headaches.     Objective:     Vital Signs (Most Recent):  Temp: 97.7 °F (36.5 °C) (12/08/20 0740)  Pulse: 69 (12/08/20 1446)  Resp: 20 (12/08/20 0900)  BP: (!) 166/77 (12/08/20 0740)  SpO2: 97 % (12/08/20 1446) Vital Signs (24h Range):  Temp:  [97.7 °F (36.5 °C)-98.4 °F (36.9 °C)] 97.7 °F (36.5 °C)  Pulse:  [69-94] 69  Resp:  [16-32] 20  SpO2:  [94 %-97 %] 97 %  BP: (134-175)/(61-77) 166/77     Weight: 68.7 kg (151 lb 7.3 oz)  Body mass index is 26.83 kg/m².    Intake/Output Summary (Last 24 hours) at 12/8/2020 1527  Last data filed at 12/8/2020 0700  Gross per 24 hour   Intake 240 ml   Output 1400 ml   Net -1160 ml      Physical Exam  Constitutional:       Appearance: Normal appearance.   HENT:      Head: Normocephalic and atraumatic.      Nose: Nose normal. No congestion or rhinorrhea.      Mouth/Throat:      Mouth: Mucous membranes are moist.      Pharynx: Oropharynx is clear.   Eyes:      Extraocular Movements: Extraocular movements intact.      Conjunctiva/sclera: Conjunctivae normal.      Pupils: Pupils are equal, round, and reactive to light.   Neck:      Musculoskeletal: Normal range of motion and neck supple.   Cardiovascular:      Rate and Rhythm: Normal rate and regular rhythm.      Heart sounds: No murmur.   Pulmonary:      Effort: No respiratory distress.      Breath sounds: No stridor. Wheezing present. No rhonchi.   Abdominal:      General: Abdomen is flat. There is no distension.      Palpations: Abdomen is soft.      Tenderness: There is no abdominal tenderness.   Musculoskeletal: Normal range of motion.         General: No swelling, deformity or signs of injury.   Skin:     General: Skin is warm and dry.      Capillary Refill: Capillary refill takes less than 2 seconds.   Neurological:      General: No focal deficit present.      Mental Status: She is alert and oriented to person, place, and time.          Significant Labs:   Blood Culture:   Recent Labs   Lab 12/07/20  0600 12/07/20  0604   LABBLOO No Growth to date  No Growth to date No Growth to date  No Growth to date     BMP:   Recent Labs   Lab 12/08/20  0348   *   *   K 4.3   CL 99   CO2 18*   BUN 40*   CREATININE 1.8*   CALCIUM 8.3*   MG 1.7     CBC:   Recent Labs   Lab 12/06/20  1852 12/07/20  0604 12/08/20  0348   WBC 10.13 6.74 5.47   HGB 9.8* 8.5* 9.1*   HCT 31.3* 27.8* 29.4*    190 182     CMP:   Recent Labs   Lab 12/06/20  1852 12/07/20  0604 12/08/20  0348   * 133* 129*   K 4.3 3.8 4.3    103 99   CO2 21* 21* 18*   * 69* 278*   BUN 26* 24* 40*   CREATININE 1.8* 1.6* 1.8*   CALCIUM 8.7 8.1* 8.3*   PROT 6.9 6.0 6.3   ALBUMIN 2.8* 2.5* 2.4*   BILITOT 0.2 0.2 0.2   ALKPHOS 86 73 70   AST 22 18 19   ALT 16 14 14   ANIONGAP 10 9 12   EGFRNONAA 26.0* 30.0* 26.0*       Significant Imaging: I have reviewed all pertinent imaging results/findings within the past 24 hours.      Assessment/Plan:      * Pneumonia due to COVID-19 virus  COVID-19 Virus Infection  Viral Pneumonia due to COVID-19  - COVID-19 testing: Collection Date: 12/1/2020 Collection Time:   9:22 AM   - Isolation: Airborne/Droplet. Surgical mask on patient. Notify Infection Control  - Diagnostics: Trend Q48hrs if stable, more frequently if patient decompensating       - CBC       - CMP       - Mg        - D-dimer       - Ferritin       - LDH    Lymphopenia, hyponatremia, hyperferritinemia, elevated troponin, elevated d-dimer, age, and medical comorbidities are significant predictors of poor clinical outcome    - Management: Per [facility] COVID Treatment Protocol    - Telemetry & Continuous Pulse Oximetry    - Nutrition:        - Multivitamin PO daily - pt allergic to iodine       - Boost supplement       - Vitamin D 2000IU daily - home dose        - Ascorbic acid 500mg PO bid    - Supportive Care:       - acetaminophen 650mg PO Q6hr PRN fever/headache        - loperamide PRN viral diarrhea       - IVF if indicated, restrictive strategy preferred, no maintenance IV if able       - VTE PPx: enoxaparin 30 given low CrCl (13mL/min)    - Antibiotics:       - azithromycin 250mg po daily x 2 days - patient was prescribed Zpack on the 3rd       - can add ceftriaxone of there is further concern for concomitant bacterial PNA     - Remdesivir      - Dexamethasone 6mg daily x 10 days      Acute Hypoxemic Respiratory Failure    - Order RT consult via Respiratory Communication for COVID Protocols    - Incentive Spirometer Q4h, Flutter Valve Q4h    - Continuous Pulse Oximetry, goal SpO2 92-96%    - Supplemental O2 via LFNC, VentiMask, or HFNC (see Respiratory Support Oxygen Therapies)    - If wheezing, albuterol INH Q6h scheduled & PRN    - Proning Protocol if patient is a candidate (see [facility] Proning Protocol)   - GCS >13, able to self-prone    - If deterioration, may warrant trial of NIPPV in neg pressure room or immediate ICU consult          CKD (chronic kidney disease) stage 4, GFR 15-29 ml/min  Baseline Cr noted to be ~1.6, most recently in the last 2 months Cr at 1.8    - Renally dose all medications to current GFR  - Avoid nephrotoxic meds/agents such as NSAIDs, gadolinium and IV radiocontrast  - Strict intake and output and daily standing weights  - Trend daily labs        Uncontrolled type 2 diabetes mellitus with hyperglycemia  HOME MED = linagliptin, metformin, Lantus 34U QHS  Hemoglobin A1C   Date Value Ref Range Status   11/16/2020 6.9 (H) 4.0 - 5.6 % Final     Comment:     ADA Screening Guidelines:  5.7-6.4%  Consistent with prediabetes  >or=6.5%  Consistent with diabetes  High levels of fetal hemoglobin interfere with the HbA1C  assay. Heterozygous hemoglobin variants (HbS, HgC, etc)do  not significantly interfere with this assay.   However, presence of multiple variants may affect accuracy.            PLAN:  - Increased 20U detemir TID, insulin aspart 9u TID. Closely  following glucose checks. Will transition to insulin drip if unimproved  - MDSSI, POCT glucose AC/HS  - diabetic diet      Hypertension associated with diabetes  HOME MEDS = carvedilol 50 mg BID, irbesartan 300 mg qday, diltiazem 180 mg qday, chlorthalidone 12.5 mg qday, hydralazine 100 mg TID, clonidine patch 0.1 mg/24 h (taken weekly)     PLAN:  - hold chlorthalidone in context of COVID infection, continue other home medications  - on weekly clonidine patch      Coronary artery disease involving native coronary artery without angina pectoris - Non-obstructive 50% LAD  - home ASA  - continue home pravastatin      Asthma, chronic    - continue scheduled Albuterol q6h, Duo nebs PRN  - continue Breo (home Advair not on formulary)  - further treatment per covid protocol    VTE Risk Mitigation (From admission, onward)         Ordered     enoxaparin injection 30 mg  Every 24 hours      12/06/20 2211     IP VTE HIGH RISK PATIENT  Once      12/06/20 2211     Place sequential compression device  Until discontinued      12/06/20 2211                Discharge Planning   ALIX: 12/11/2020     Code Status: Full Code   Is the patient medically ready for discharge?: No    Reason for patient still in hospital (select all that apply): Treatment  Discharge Plan A: Home with family   Discharge Delays: None known at this time              Louie Perera MD  Department of Hospital Medicine   [facility] - ICU 14 [facility]

## 2020-12-08 NOTE — PLAN OF CARE
SW assigned to case today 12/8/20. SW will assist team with DC needs. ABELARDO in communication with CM.    Lashawn Martinez LMSW   - Case Management

## 2020-12-08 NOTE — PLAN OF CARE
Plan of care discussed with pt. Pt AOx4. No issues overnight. VS stable, afebrile, VPaced on telemetry.  Scheduled meds given. Frequent cough, PRN tessalon kellen given. Blood Sugar >300 overnight, covered per sliding scale. Voiding adequately w/ Pure wick in place.   Pt denies CP, SOB, or pain/discomfort at this time.Pt free of injuries this shift.  All questions addressed.  Will continue to monitor.

## 2020-12-08 NOTE — ASSESSMENT & PLAN NOTE
HOME MED = linagliptin, metformin, Lantus 34U QHS  Hemoglobin A1C   Date Value Ref Range Status   11/16/2020 6.9 (H) 4.0 - 5.6 % Final     Comment:     ADA Screening Guidelines:  5.7-6.4%  Consistent with prediabetes  >or=6.5%  Consistent with diabetes  High levels of fetal hemoglobin interfere with the HbA1C  assay. Heterozygous hemoglobin variants (HbS, HgC, etc)do  not significantly interfere with this assay.   However, presence of multiple variants may affect accuracy.            PLAN:  - Increased 20U detemir TID, insulin aspart 9u TID. Closely following glucose checks. Will transition to insulin drip if unimproved  - MDSSI, POCT glucose AC/HS  - diabetic diet

## 2020-12-08 NOTE — RESEARCH
Myesha Quinones is currently being screened for the COVID ATTACC Trial (2020.169, Dr Percy Taylor PI). This study randomizes patients between therapeutic dose heparin (enoxaparin preferred) and usual care (including prophylactic doses).  Dr. Porter, the attending, agrees that the patient is a good candidate for the study.     An update to eligibility and consent status will be made. Please contact [phone] Karolina Castellanos with questions.

## 2020-12-09 ENCOUNTER — PES CALL (OUTPATIENT)
Dept: ADMINISTRATIVE | Facility: CLINIC | Age: 80
End: 2020-12-09

## 2020-12-09 PROBLEM — J96.01 ACUTE HYPOXEMIC RESPIRATORY FAILURE: Status: RESOLVED | Noted: 2019-12-19 | Resolved: 2020-12-09

## 2020-12-09 LAB
ALBUMIN SERPL BCP-MCNC: 2.7 G/DL (ref 3.5–5.2)
ALP SERPL-CCNC: 72 U/L (ref 55–135)
ALT SERPL W/O P-5'-P-CCNC: 16 U/L (ref 10–44)
ANION GAP SERPL CALC-SCNC: 15 MMOL/L (ref 8–16)
AST SERPL-CCNC: 23 U/L (ref 10–40)
BASOPHILS # BLD AUTO: 0.03 K/UL (ref 0–0.2)
BASOPHILS NFR BLD: 0.3 % (ref 0–1.9)
BILIRUB SERPL-MCNC: 0.2 MG/DL (ref 0.1–1)
BUN SERPL-MCNC: 60 MG/DL (ref 8–23)
CALCIUM SERPL-MCNC: 8.8 MG/DL (ref 8.7–10.5)
CHLORIDE SERPL-SCNC: 102 MMOL/L (ref 95–110)
CO2 SERPL-SCNC: 15 MMOL/L (ref 23–29)
CREAT SERPL-MCNC: 1.9 MG/DL (ref 0.5–1.4)
DIFFERENTIAL METHOD: ABNORMAL
EOSINOPHIL # BLD AUTO: 0.6 K/UL (ref 0–0.5)
EOSINOPHIL NFR BLD: 6.3 % (ref 0–8)
ERYTHROCYTE [DISTWIDTH] IN BLOOD BY AUTOMATED COUNT: 13.3 % (ref 11.5–14.5)
EST. GFR  (AFRICAN AMERICAN): 28.1 ML/MIN/1.73 M^2
EST. GFR  (NON AFRICAN AMERICAN): 24.4 ML/MIN/1.73 M^2
GLUCOSE SERPL-MCNC: 229 MG/DL (ref 70–110)
HCT VFR BLD AUTO: 32.3 % (ref 37–48.5)
HGB BLD-MCNC: 9.6 G/DL (ref 12–16)
IMM GRANULOCYTES # BLD AUTO: 0.09 K/UL (ref 0–0.04)
IMM GRANULOCYTES NFR BLD AUTO: 0.9 % (ref 0–0.5)
LYMPHOCYTES # BLD AUTO: 1.1 K/UL (ref 1–4.8)
LYMPHOCYTES NFR BLD: 11.8 % (ref 18–48)
MAGNESIUM SERPL-MCNC: 1.9 MG/DL (ref 1.6–2.6)
MCH RBC QN AUTO: 28.5 PG (ref 27–31)
MCHC RBC AUTO-ENTMCNC: 29.7 G/DL (ref 32–36)
MCV RBC AUTO: 96 FL (ref 82–98)
MONOCYTES # BLD AUTO: 0.6 K/UL (ref 0.3–1)
MONOCYTES NFR BLD: 6.6 % (ref 4–15)
NEUTROPHILS # BLD AUTO: 7.1 K/UL (ref 1.8–7.7)
NEUTROPHILS NFR BLD: 74.1 % (ref 38–73)
NRBC BLD-RTO: 0 /100 WBC
PHOSPHATE SERPL-MCNC: 3.7 MG/DL (ref 2.7–4.5)
PLATELET # BLD AUTO: 254 K/UL (ref 150–350)
PLATELET BLD QL SMEAR: ABNORMAL
PMV BLD AUTO: 11.5 FL (ref 9.2–12.9)
POCT GLUCOSE: 123 MG/DL (ref 70–110)
POCT GLUCOSE: 189 MG/DL (ref 70–110)
POCT GLUCOSE: 191 MG/DL (ref 70–110)
POCT GLUCOSE: 218 MG/DL (ref 70–110)
POCT GLUCOSE: 231 MG/DL (ref 70–110)
POCT GLUCOSE: 398 MG/DL (ref 70–110)
POTASSIUM SERPL-SCNC: 4.2 MMOL/L (ref 3.5–5.1)
PROT SERPL-MCNC: 6.9 G/DL (ref 6–8.4)
RBC # BLD AUTO: 3.37 M/UL (ref 4–5.4)
SODIUM SERPL-SCNC: 132 MMOL/L (ref 136–145)
WBC # BLD AUTO: 9.57 K/UL (ref 3.9–12.7)

## 2020-12-09 PROCEDURE — 94664 DEMO&/EVAL PT USE INHALER: CPT | Mod: HCNC

## 2020-12-09 PROCEDURE — 84100 ASSAY OF PHOSPHORUS: CPT | Mod: HCNC

## 2020-12-09 PROCEDURE — 11000001 HC ACUTE MED/SURG PRIVATE ROOM: Mod: HCNC

## 2020-12-09 PROCEDURE — 99233 SBSQ HOSP IP/OBS HIGH 50: CPT | Mod: HCNC,GC,, | Performed by: INTERNAL MEDICINE

## 2020-12-09 PROCEDURE — 99900035 HC TECH TIME PER 15 MIN (STAT): Mod: HCNC

## 2020-12-09 PROCEDURE — 25000003 PHARM REV CODE 250: Mod: HCNC | Performed by: STUDENT IN AN ORGANIZED HEALTH CARE EDUCATION/TRAINING PROGRAM

## 2020-12-09 PROCEDURE — 94640 AIRWAY INHALATION TREATMENT: CPT | Mod: HCNC

## 2020-12-09 PROCEDURE — C9399 UNCLASSIFIED DRUGS OR BIOLOG: HCPCS | Mod: HCNC | Performed by: STUDENT IN AN ORGANIZED HEALTH CARE EDUCATION/TRAINING PROGRAM

## 2020-12-09 PROCEDURE — 36415 COLL VENOUS BLD VENIPUNCTURE: CPT | Mod: HCNC

## 2020-12-09 PROCEDURE — 63600175 PHARM REV CODE 636 W HCPCS: Mod: HCNC | Performed by: STUDENT IN AN ORGANIZED HEALTH CARE EDUCATION/TRAINING PROGRAM

## 2020-12-09 PROCEDURE — 94761 N-INVAS EAR/PLS OXIMETRY MLT: CPT | Mod: HCNC

## 2020-12-09 PROCEDURE — A4216 STERILE WATER/SALINE, 10 ML: HCPCS | Mod: HCNC | Performed by: STUDENT IN AN ORGANIZED HEALTH CARE EDUCATION/TRAINING PROGRAM

## 2020-12-09 PROCEDURE — 97116 GAIT TRAINING THERAPY: CPT | Mod: HCNC

## 2020-12-09 PROCEDURE — 83735 ASSAY OF MAGNESIUM: CPT | Mod: HCNC

## 2020-12-09 PROCEDURE — 85025 COMPLETE CBC W/AUTO DIFF WBC: CPT | Mod: HCNC

## 2020-12-09 PROCEDURE — 63700000 PHARM REV CODE 250 ALT 637 W/O HCPCS: Mod: HCNC | Performed by: STUDENT IN AN ORGANIZED HEALTH CARE EDUCATION/TRAINING PROGRAM

## 2020-12-09 PROCEDURE — 80053 COMPREHEN METABOLIC PANEL: CPT | Mod: HCNC

## 2020-12-09 PROCEDURE — 99233 PR SUBSEQUENT HOSPITAL CARE,LEVL III: ICD-10-PCS | Mod: HCNC,GC,, | Performed by: INTERNAL MEDICINE

## 2020-12-09 RX ORDER — INSULIN ASPART 100 [IU]/ML
11 INJECTION, SOLUTION INTRAVENOUS; SUBCUTANEOUS
Status: DISCONTINUED | OUTPATIENT
Start: 2020-12-09 | End: 2020-12-10

## 2020-12-09 RX ORDER — LEVOFLOXACIN 750 MG/1
750 TABLET ORAL EVERY OTHER DAY
Status: DISCONTINUED | OUTPATIENT
Start: 2020-12-09 | End: 2020-12-10

## 2020-12-09 RX ORDER — LEVOFLOXACIN 500 MG/1
500 TABLET, FILM COATED ORAL EVERY OTHER DAY
Status: CANCELLED | OUTPATIENT
Start: 2020-12-11 | End: 2020-12-15

## 2020-12-09 RX ORDER — ASCORBIC ACID 500 MG
500 TABLET ORAL 2 TIMES DAILY
COMMUNITY
Start: 2020-12-09 | End: 2020-12-19

## 2020-12-09 RX ADMIN — Medication 500 MG: at 08:12

## 2020-12-09 RX ADMIN — CARVEDILOL 50 MG: 25 TABLET, FILM COATED ORAL at 08:12

## 2020-12-09 RX ADMIN — CARVEDILOL 50 MG: 25 TABLET, FILM COATED ORAL at 09:12

## 2020-12-09 RX ADMIN — PRAVASTATIN SODIUM 40 MG: 40 TABLET ORAL at 08:12

## 2020-12-09 RX ADMIN — MELATONIN TAB 3 MG 6 MG: 3 TAB at 09:12

## 2020-12-09 RX ADMIN — FLUTICASONE FUROATE AND VILANTEROL TRIFENATATE 1 PUFF: 200; 25 POWDER RESPIRATORY (INHALATION) at 07:12

## 2020-12-09 RX ADMIN — LEVOFLOXACIN 750 MG: 750 TABLET, FILM COATED ORAL at 04:12

## 2020-12-09 RX ADMIN — INSULIN ASPART 4 UNITS: 100 INJECTION, SOLUTION INTRAVENOUS; SUBCUTANEOUS at 08:12

## 2020-12-09 RX ADMIN — REMDESIVIR 100 MG: 100 INJECTION, POWDER, LYOPHILIZED, FOR SOLUTION INTRAVENOUS at 05:12

## 2020-12-09 RX ADMIN — HYDRALAZINE HYDROCHLORIDE 100 MG: 50 TABLET, FILM COATED ORAL at 03:12

## 2020-12-09 RX ADMIN — AZITHROMYCIN MONOHYDRATE 500 MG: 250 TABLET ORAL at 08:12

## 2020-12-09 RX ADMIN — CETIRIZINE HYDROCHLORIDE 5 MG: 5 TABLET ORAL at 09:12

## 2020-12-09 RX ADMIN — INSULIN ASPART 11 UNITS: 100 INJECTION, SOLUTION INTRAVENOUS; SUBCUTANEOUS at 11:12

## 2020-12-09 RX ADMIN — ALBUTEROL SULFATE 2 PUFF: 90 AEROSOL, METERED RESPIRATORY (INHALATION) at 02:12

## 2020-12-09 RX ADMIN — MUPIROCIN: 20 OINTMENT TOPICAL at 09:12

## 2020-12-09 RX ADMIN — Medication 2000 UNITS: at 08:12

## 2020-12-09 RX ADMIN — ALBUTEROL SULFATE 2 PUFF: 90 AEROSOL, METERED RESPIRATORY (INHALATION) at 07:12

## 2020-12-09 RX ADMIN — Medication 10 ML: at 08:12

## 2020-12-09 RX ADMIN — MONTELUKAST 10 MG: 10 TABLET, FILM COATED ORAL at 09:12

## 2020-12-09 RX ADMIN — INSULIN ASPART 2 UNITS: 100 INJECTION, SOLUTION INTRAVENOUS; SUBCUTANEOUS at 04:12

## 2020-12-09 RX ADMIN — MUPIROCIN: 20 OINTMENT TOPICAL at 08:12

## 2020-12-09 RX ADMIN — INSULIN DETEMIR 24 UNITS: 100 INJECTION, SOLUTION SUBCUTANEOUS at 08:12

## 2020-12-09 RX ADMIN — INSULIN DETEMIR 17 UNITS: 100 INJECTION, SOLUTION SUBCUTANEOUS at 10:12

## 2020-12-09 RX ADMIN — ACETAMINOPHEN 1000 MG: 500 TABLET ORAL at 11:12

## 2020-12-09 RX ADMIN — HYDRALAZINE HYDROCHLORIDE 100 MG: 50 TABLET, FILM COATED ORAL at 08:12

## 2020-12-09 RX ADMIN — DEXAMETHASONE 6 MG: 4 TABLET ORAL at 08:12

## 2020-12-09 RX ADMIN — Medication 500 MG: at 09:12

## 2020-12-09 RX ADMIN — DILTIAZEM HYDROCHLORIDE 180 MG: 180 CAPSULE, COATED, EXTENDED RELEASE ORAL at 08:12

## 2020-12-09 RX ADMIN — ENOXAPARIN SODIUM 30 MG: 100 INJECTION SUBCUTANEOUS at 09:12

## 2020-12-09 RX ADMIN — BENZONATATE 200 MG: 100 CAPSULE ORAL at 09:12

## 2020-12-09 RX ADMIN — ASPIRIN 81 MG: 81 TABLET, COATED ORAL at 08:12

## 2020-12-09 RX ADMIN — ALBUTEROL SULFATE 2 PUFF: 90 AEROSOL, METERED RESPIRATORY (INHALATION) at 12:12

## 2020-12-09 RX ADMIN — INSULIN ASPART 11 UNITS: 100 INJECTION, SOLUTION INTRAVENOUS; SUBCUTANEOUS at 04:12

## 2020-12-09 RX ADMIN — INSULIN ASPART 10 UNITS: 100 INJECTION, SOLUTION INTRAVENOUS; SUBCUTANEOUS at 11:12

## 2020-12-09 RX ADMIN — HYDRALAZINE HYDROCHLORIDE 100 MG: 50 TABLET, FILM COATED ORAL at 09:12

## 2020-12-09 RX ADMIN — IRBESARTAN 300 MG: 150 TABLET ORAL at 09:12

## 2020-12-09 NOTE — ASSESSMENT & PLAN NOTE
HOME MED = linagliptin, metformin, Lantus 34U QHS  Hemoglobin A1C   Date Value Ref Range Status   11/16/2020 6.9 (H) 4.0 - 5.6 % Final     Comment:     ADA Screening Guidelines:  5.7-6.4%  Consistent with prediabetes  >or=6.5%  Consistent with diabetes  High levels of fetal hemoglobin interfere with the HbA1C  assay. Heterozygous hemoglobin variants (HbS, HgC, etc)do  not significantly interfere with this assay.   However, presence of multiple variants may affect accuracy.            PLAN:  - on 24 U detemir BID and 11 U aspart TIDWM. Closely following glucose checks. Will transition to insulin drip if unimproved  - MDSSI, POCT glucose AC/HS  - diabetic diet

## 2020-12-09 NOTE — NURSING
Home Oxygen Evaluation    Date Performed: 2020    1) Patient's Home O2 Sat on room air, while at rest: 94%        If O2 sats on room air at rest are 88% or below, patient qualifies. No additional testing needed. Document N/A in steps 2 and 3. If 89% or above, complete steps 2.      2) Patient's O2 Sat on room air while exercisin%        If O2 sats on room air while exercising remain 89% or above patient does not qualify, no further testing needed Document N/A in step 3. If O2 sats on room air while exercising are 88% or below, continue to step 3.      3) Patient's O2 Sat while exercising on O2: 95% at 1 LPM         (Must show improvement from #2 for patients to qualify)    If O2 sats improve on oxygen, patient qualifies for portable oxygen. If not, the patient does not qualify.

## 2020-12-09 NOTE — ASSESSMENT & PLAN NOTE
COVID-19 Virus Infection  Viral Pneumonia due to COVID-19  - COVID-19 testing: Collection Date: 12/1/2020 Collection Time:   9:22 AM   - Isolation: Airborne/Droplet. Surgical mask on patient. Notify Infection Control  - Diagnostics: Trend Q48hrs if stable, more frequently if patient decompensating       - CBC       - CMP       - Mg        - D-dimer       - Ferritin       - LDH    Lymphopenia, hyponatremia, hyperferritinemia, elevated troponin, elevated d-dimer, age, and medical comorbidities are significant predictors of poor clinical outcome    - Management: Per [facility] COVID Treatment Protocol    - Telemetry & Continuous Pulse Oximetry    - Nutrition:        - Multivitamin PO daily - pt allergic to iodine       - Boost supplement       - Vitamin D 2000IU daily - home dose        - Ascorbic acid 500mg PO bid    - Supportive Care:       - acetaminophen 650mg PO Q6hr PRN fever/headache       - loperamide PRN viral diarrhea       - IVF if indicated, restrictive strategy preferred, no maintenance IV if able       - VTE PPx: enoxaparin 30 given low CrCl (13mL/min)    - Antibiotics:       - azithromycin 250mg po daily x 2 days - patient was prescribed Zpack on the 3rd (d2)       - can add ceftriaxone of there is further concern for concomitant bacterial PNA     - Remdesivir (d2)     - Dexamethasone 6mg daily x 10 days (d3)      Acute Hypoxemic Respiratory Failure    - Order RT consult via Respiratory Communication for COVID Protocols    - Incentive Spirometer Q4h, Flutter Valve Q4h    - Continuous Pulse Oximetry, goal SpO2 92-96%    - Supplemental O2 via LFNC, VentiMask, or HFNC (see Respiratory Support Oxygen Therapies)    - If wheezing, albuterol INH Q6h scheduled & PRN    - Proning Protocol if patient is a candidate (see  Proning Protocol)   - GCS >13, able to self-prone    - If deterioration, may warrant trial of NIPPV in neg pressure room or immediate ICU consult    On 12/9 patient able to tolerate RA satting >95.  Will do 6 minute walk test. even if tolerated, will plan to continue hospitalization since multiple coomorbidities

## 2020-12-09 NOTE — SUBJECTIVE & OBJECTIVE
Interval History: no acute events overnight     Review of Systems   Constitutional: Negative for chills, fatigue and fever.   HENT: Negative for congestion, rhinorrhea and sore throat.    Eyes: Negative for pain and visual disturbance.   Respiratory: Positive for cough. Negative for apnea, shortness of breath and wheezing.    Cardiovascular: Negative for chest pain, palpitations and leg swelling.   Gastrointestinal: Negative for abdominal pain, diarrhea, nausea and vomiting.   Genitourinary: Negative for difficulty urinating, flank pain and hematuria.   Musculoskeletal: Negative for myalgias.   Skin: Negative for rash.   Neurological: Negative for light-headedness and headaches.   Psychiatric/Behavioral: Negative for agitation, behavioral problems, confusion and decreased concentration.     Objective:     Vital Signs (Most Recent):  Temp: 97.2 °F (36.2 °C) (12/09/20 0400)  Pulse: 72 (12/09/20 0747)  Resp: (!) 21 (12/09/20 0741)  BP: (!) 155/69 (12/09/20 0741)  SpO2: (!) 94 % (12/09/20 0747) Vital Signs (24h Range):  Temp:  [97.2 °F (36.2 °C)-97.8 °F (36.6 °C)] 97.2 °F (36.2 °C)  Pulse:  [66-80] 72  Resp:  [16-30] 21  SpO2:  [92 %-99 %] 94 %  BP: (136-158)/(65-69) 155/69     Weight: 68.7 kg (151 lb 7.3 oz)  Body mass index is 26.83 kg/m².  No intake or output data in the 24 hours ending 12/09/20 1021   Physical Exam  Constitutional:       Appearance: Normal appearance.   HENT:      Head: Normocephalic and atraumatic.      Nose: Nose normal. No congestion or rhinorrhea.      Mouth/Throat:      Mouth: Mucous membranes are moist.      Pharynx: Oropharynx is clear.   Eyes:      Extraocular Movements: Extraocular movements intact.      Conjunctiva/sclera: Conjunctivae normal.      Pupils: Pupils are equal, round, and reactive to light.   Neck:      Musculoskeletal: Normal range of motion and neck supple.   Cardiovascular:      Rate and Rhythm: Normal rate and regular rhythm.      Heart sounds: No murmur.   Pulmonary:       Effort: No respiratory distress.      Breath sounds: No stridor. Wheezing present. No rhonchi.   Abdominal:      General: Abdomen is flat. There is no distension.      Palpations: Abdomen is soft.      Tenderness: There is no abdominal tenderness.   Musculoskeletal: Normal range of motion.         General: No swelling, deformity or signs of injury.   Skin:     General: Skin is warm and dry.      Capillary Refill: Capillary refill takes less than 2 seconds.   Neurological:      General: No focal deficit present.      Mental Status: She is alert and oriented to person, place, and time.         Significant Labs:   Blood Culture: No results for input(s): LABBLOO in the last 48 hours.  CBC:   Recent Labs   Lab 12/08/20  0348   WBC 5.47   HGB 9.1*   HCT 29.4*        CMP:   Recent Labs   Lab 12/08/20  0348   *   K 4.3   CL 99   CO2 18*   *   BUN 40*   CREATININE 1.8*   CALCIUM 8.3*   PROT 6.3   ALBUMIN 2.4*   BILITOT 0.2   ALKPHOS 70   AST 19   ALT 14   ANIONGAP 12   EGFRNONAA 26.0*       Significant Imaging: I have reviewed and interpreted all pertinent imaging results/findings within the past 24 hours.

## 2020-12-09 NOTE — PLAN OF CARE
SW assigned to case today 12/9/20. SW will assist team with DC needs. ABELARDO in communication with CM.    Lashawn Martinez LMSW   - Case Management

## 2020-12-09 NOTE — HOSPITAL COURSE
Patient was admitted for acute hypoxic resp failure 2/2 to [facility] [place]. She completed azithromycin that was prescribed outpatient. Wjhile here she received dexamethasone and remdesivir. Patient is on room air and appears to be doing well. Renal function remained stable. Failed 6MWT (desstated to 88% with ambulation but recovers quickly on RA). Patient going back and forth between  and 1-2 L NC,  Approved for home O2. New VIRGILIO possibly due to poor intake, will give fluids and analyze urine. Blood sugar levels increasing, beta hydroxy but normal, will start insulin drip.

## 2020-12-09 NOTE — PT/OT/SLP PROGRESS
"Physical Therapy Treatment    Patient Name:  Myesha Quinones   MRN:  [id number]    Recommendations:     Discharge Recommendations:  home health PT, home with home health   Discharge Equipment Recommendations: none   Barriers to discharge: Limited Assistance at home - spouse admitted with COVID as well     Assessment:     Myesha Quinones is a 80 y.o. female admitted with a medical diagnosis of Pneumonia due to COVID-19 virus.  She presents with the following impairments/functional limitations:  weakness, impaired endurance, impaired self care skills, impaired functional mobilty, gait instability, impaired balance, impaired cardiopulmonary response to activity. Patient tolerated session well. She reports feeling overall fatigued, but better than previously. Patient would continue to benefit from skilled PT services while in the hospital. At this time, upon d/c rec of HH in order for patient to progress towards an improved level of functional mobility independence.     Rehab Prognosis: Good; patient would benefit from acute skilled PT services to address these deficits and reach maximum level of function.    Recent Surgery: * No surgery found *      Plan:     During this hospitalization, patient to be seen 3 x/week to address the identified rehab impairments via gait training, therapeutic activities, therapeutic exercises, neuromuscular re-education and progress toward the following goals:    · Plan of Care Expires:  01/06/21    Subjective     Chief Complaint: fatigue   Patient/Family Comments/goals: "Does this fatigue last forever?"   Pain/Comfort:  · Pain Rating 1: 0/10  · Pain Rating Post-Intervention 1: 0/10      Objective:     Communicated with RN prior to session.  Patient found up in chair with telemetry, pulse ox (continuous), peripheral IV upon PT entry to room.     General Precautions: Standard, airborne, contact, droplet, fall   Orthopedic Precautions:N/A   Braces: N/A     Functional " Mobility:  · Transfers:     · Sit to Stand:  stand by assistance with no AD  · 2x   · Gait: 60ft with CGA and no AD  ·  decreased sandra, decreased reciprocal arm swing  ·  mildly unsteady, no LOB, narrow JEROD  · Balance: static standing - SBA; dynamic standing - SBA       AM-PAC 6 CLICK MOBILITY  Turning over in bed (including adjusting bedclothes, sheets and blankets)?: 4  Sitting down on and standing up from a chair with arms (e.g., wheelchair, bedside commode, etc.): 4  Moving from lying on back to sitting on the side of the bed?: 4  Moving to and from a bed to a chair (including a wheelchair)?: 4  Need to walk in hospital room?: 3  Climbing 3-5 steps with a railing?: 3  Basic Mobility Total Score: 22       Therapeutic Activities and Exercises:  -Patient educated on the continued role and goal of PT  -Questions and concerns answered within the the PT scope of practice.   -White board updated in patient room to reflect level of assistance needed with nursing. RNx1 - SBA     Patient left up in chair with all lines intact, call button in reach and RN notified..    GOALS:   Multidisciplinary Problems     Physical Therapy Goals        Problem: Physical Therapy Goal    Goal Priority Disciplines Outcome Goal Variances Interventions   Physical Therapy Goal     PT, PT/OT Ongoing, Progressing     Description: Goals to be met by: 20     Patient will increase functional independence with mobility by performin. Supine to sit with Noti  2. Sit to supine with Noti  3. Sit to stand transfer with Modified Noti  4. Gait  x 150 feet with Modified Noti using LRAD, if needed.                      Time Tracking:     PT Received On: 20  PT Start Time: 1211     PT Stop Time: 1234  PT Total Time (min): 23 min     Billable Minutes: Gait Training 23min    Treatment Type: Treatment  PT/PTA: PT     PTA Visit Number: 0     Adriana Mueller, PT  2020

## 2020-12-09 NOTE — PLAN OF CARE
Referral sent to [facility].     Patient approved for home o2.     SW will continue to coordinate with patient, family, team and insurance to complete patient's discharge plan.       12/09/20 1519   Post-Acute Status   Post-Acute Authorization Home Health   Home Health Status Referrals Sent   Discharge Plan   Discharge Plan A Home Health     Lashawn Martinez LMSW   - Case Management

## 2020-12-09 NOTE — PLAN OF CARE
Problem: Physical Therapy Goal  Goal: Physical Therapy Goal  Description: Goals to be met by: 20     Patient will increase functional independence with mobility by performin. Supine to sit with Garvin  2. Sit to supine with Garvin  3. Sit to stand transfer with Modified Garvin  4. Gait  x 150 feet with Modified Garvin using LRAD, if needed.     Outcome: Ongoing, Progressing     Patient tolerated treatment well. Established POC and goals reviewed and remain appropriate. Plan is to continue to improve patient's functional mobility capabilities.      Adriana Mueller, PT, DPT  2020

## 2020-12-09 NOTE — PROGRESS NOTES
[facility] - [facility] [place] [facility] Medicine  Progress Note    Patient Name: Myesha Quinones  MRN: [id number]  Patient Class: IP- Inpatient   Admission Date: 12/6/2020  Length of Stay: 3 days  Attending Physician: Morro Porter MD  Primary Care Provider: Emelina Gaston MD        Subjective:     Principal Problem:Pneumonia due to COVID-19 virus        HPI:  This is an 80 year old female with pmhx of Asthma, CAD, ischemic cardiomyopathy, CHF, CKD3, insulin dependent T2DM,  breast cancer, HTN, HLD who was tested positive for COVID after her daughter and  had tested positive for it last week. She was initially sent home with normal vitals and examination. She was diagnosed on the 1st of this month. Today she presents with fevers, increased fatigue, loose stools, and dyspnea. She has a chronic cough with occasional green tinged sputum but is now having clear sputum. She called EMS and was found to be satting in the low 80's and responded to oxygen supplementation. Otherwise she does not have any other complaints. Denies any chest pain, headache, nausea, vomiting.    In the ED she was placed on 8L NC with saturations ~99%, hypertensive 161/67, afebrile. Laboratory evaluation revealed chronic anemia, mild hyponatremia. Imaging with x-ray of the chest revealed stable findings.    Overview/Hospital Course:  Patient on azithromycin, dexamethasone and remdesivir. Running high in BP and Blood sugar. Tolerating RA satting >95. Will try 6 minute walk test, even if tolerated, will plan to continue hospitalization since multiple coomorbidities.     Interval History: no acute events overnight     Review of Systems   Constitutional: Negative for chills, fatigue and fever.   HENT: Negative for congestion, rhinorrhea and sore throat.    Eyes: Negative for pain and visual disturbance.   Respiratory: Positive for cough. Negative for apnea, shortness of breath and wheezing.    Cardiovascular: Negative for chest  pain, palpitations and leg swelling.   Gastrointestinal: Negative for abdominal pain, diarrhea, nausea and vomiting.   Genitourinary: Negative for difficulty urinating, flank pain and hematuria.   Musculoskeletal: Negative for myalgias.   Skin: Negative for rash.   Neurological: Negative for light-headedness and headaches.   Psychiatric/Behavioral: Negative for agitation, behavioral problems, confusion and decreased concentration.     Objective:     Vital Signs (Most Recent):  Temp: 97.2 °F (36.2 °C) (12/09/20 0400)  Pulse: 72 (12/09/20 0747)  Resp: (!) 21 (12/09/20 0741)  BP: (!) 155/69 (12/09/20 0741)  SpO2: (!) 94 % (12/09/20 0747) Vital Signs (24h Range):  Temp:  [97.2 °F (36.2 °C)-97.8 °F (36.6 °C)] 97.2 °F (36.2 °C)  Pulse:  [66-80] 72  Resp:  [16-30] 21  SpO2:  [92 %-99 %] 94 %  BP: (136-158)/(65-69) 155/69     Weight: 68.7 kg (151 lb 7.3 oz)  Body mass index is 26.83 kg/m².  No intake or output data in the 24 hours ending 12/09/20 1021   Physical Exam  Constitutional:       Appearance: Normal appearance.   HENT:      Head: Normocephalic and atraumatic.      Nose: Nose normal. No congestion or rhinorrhea.      Mouth/Throat:      Mouth: Mucous membranes are moist.      Pharynx: Oropharynx is clear.   Eyes:      Extraocular Movements: Extraocular movements intact.      Conjunctiva/sclera: Conjunctivae normal.      Pupils: Pupils are equal, round, and reactive to light.   Neck:      Musculoskeletal: Normal range of motion and neck supple.   Cardiovascular:      Rate and Rhythm: Normal rate and regular rhythm.      Heart sounds: No murmur.   Pulmonary:      Effort: No respiratory distress.      Breath sounds: No stridor. Wheezing present. No rhonchi.   Abdominal:      General: Abdomen is flat. There is no distension.      Palpations: Abdomen is soft.      Tenderness: There is no abdominal tenderness.   Musculoskeletal: Normal range of motion.         General: No swelling, deformity or signs of injury.   Skin:      General: Skin is warm and dry.      Capillary Refill: Capillary refill takes less than 2 seconds.   Neurological:      General: No focal deficit present.      Mental Status: She is alert and oriented to person, place, and time.         Significant Labs:   Blood Culture: No results for input(s): LABBLOO in the last 48 hours.  CBC:   Recent Labs   Lab 12/08/20  0348   WBC 5.47   HGB 9.1*   HCT 29.4*        CMP:   Recent Labs   Lab 12/08/20  0348   *   K 4.3   CL 99   CO2 18*   *   BUN 40*   CREATININE 1.8*   CALCIUM 8.3*   PROT 6.3   ALBUMIN 2.4*   BILITOT 0.2   ALKPHOS 70   AST 19   ALT 14   ANIONGAP 12   EGFRNONAA 26.0*       Significant Imaging: I have reviewed and interpreted all pertinent imaging results/findings within the past 24 hours.      Assessment/Plan:      * Pneumonia due to COVID-19 virus  COVID-19 Virus Infection  Viral Pneumonia due to COVID-19  - COVID-19 testing: Collection Date: 12/1/2020 Collection Time:   9:22 AM   - Isolation: Airborne/Droplet. Surgical mask on patient. Notify Infection Control  - Diagnostics: Trend Q48hrs if stable, more frequently if patient decompensating       - CBC       - CMP       - Mg        - D-dimer       - Ferritin       - LDH    Lymphopenia, hyponatremia, hyperferritinemia, elevated troponin, elevated d-dimer, age, and medical comorbidities are significant predictors of poor clinical outcome    - Management: Per [facility] COVID Treatment Protocol    - Telemetry & Continuous Pulse Oximetry    - Nutrition:        - Multivitamin PO daily - pt allergic to iodine       - Boost supplement       - Vitamin D 2000IU daily - home dose        - Ascorbic acid 500mg PO bid    - Supportive Care:       - acetaminophen 650mg PO Q6hr PRN fever/headache       - loperamide PRN viral diarrhea       - IVF if indicated, restrictive strategy preferred, no maintenance IV if able       - VTE PPx: enoxaparin 30 given low CrCl (13mL/min)    - Antibiotics:       - azithromycin  250mg po daily x 2 days - patient was prescribed Zpack on the 3rd (d2)       - can add ceftriaxone of there is further concern for concomitant bacterial PNA     - Remdesivir (d2)     - Dexamethasone 6mg daily x 10 days (d3)      Acute Hypoxemic Respiratory Failure    - Order RT consult via Respiratory Communication for COVID Protocols    - Incentive Spirometer Q4h, Flutter Valve Q4h    - Continuous Pulse Oximetry, goal SpO2 92-96%    - Supplemental O2 via LFNC, VentiMask, or HFNC (see Respiratory Support Oxygen Therapies)    - If wheezing, albuterol INH Q6h scheduled & PRN    - Proning Protocol if patient is a candidate (see HM Proning Protocol)   - GCS >13, able to self-prone    - If deterioration, may warrant trial of NIPPV in neg pressure room or immediate ICU consult    On 12/9 patient able to tolerate RA satting >95. Will do 6 minute walk test. even if tolerated, will plan to continue hospitalization since multiple coomorbidities        CKD (chronic kidney disease) stage 4, GFR 15-29 ml/min  Baseline Cr noted to be ~1.6, most recently in the last 2 months Cr at 1.8    - Renally dose all medications to current GFR  - Avoid nephrotoxic meds/agents such as NSAIDs, gadolinium and IV radiocontrast  - Strict intake and output and daily standing weights  - Trend daily labs        Uncontrolled type 2 diabetes mellitus with hyperglycemia  HOME MED = linagliptin, metformin, Lantus 34U QHS  Hemoglobin A1C   Date Value Ref Range Status   11/16/2020 6.9 (H) 4.0 - 5.6 % Final     Comment:     ADA Screening Guidelines:  5.7-6.4%  Consistent with prediabetes  >or=6.5%  Consistent with diabetes  High levels of fetal hemoglobin interfere with the HbA1C  assay. Heterozygous hemoglobin variants (HbS, HgC, etc)do  not significantly interfere with this assay.   However, presence of multiple variants may affect accuracy.            PLAN:  - on 24 U detemir BID and 11 U aspart TIDWM. Closely following glucose checks. Will transition  to insulin drip if unimproved  - MDSSI, POCT glucose AC/HS  - diabetic diet      Hypertension associated with diabetes  HOME MEDS = carvedilol 50 mg BID, irbesartan 300 mg qday, diltiazem 180 mg qday, chlorthalidone 12.5 mg qday, hydralazine 100 mg TID, clonidine patch 0.1 mg/24 h (taken weekly)     PLAN:  - hold chlorthalidone in context of COVID infection, continue other home medications  - on weekly clonidine patch      Coronary artery disease involving native coronary artery without angina pectoris - Non-obstructive 50% LAD  - home ASA  - continue home pravastatin      Asthma, chronic    - continue scheduled Albuterol q6h, Duo nebs PRN  - continue Breo (home Advair not on formulary)  - further treatment per covid protocol      VTE Risk Mitigation (From admission, onward)         Ordered     enoxaparin injection 30 mg  Every 24 hours      12/06/20 2211     IP VTE HIGH RISK PATIENT  Once      12/06/20 2211     Place sequential compression device  Until discontinued      12/06/20 2211                Discharge Planning   ALIX: 12/9/2020     Code Status: Full Code   Is the patient medically ready for discharge?: No    Reason for patient still in hospital (select all that apply): Treatment  Discharge Plan A: Home with family   Discharge Delays: None known at this time      Rodrigo Schmidt MD  Department of Hospital Medicine   [facility] - ICU [facility]

## 2020-12-09 NOTE — PHARMACY MED REC
"Admission Medication Reconciliation - Pharmacy Consult Note    The home medication history was taken via prescription claim data software ([employer]). Based on information gathered and subsequent review by the clinical pharmacist, the items below may need attention.     You may go to "Admission" then "Reconcile Home Medications" tabs to review and/or act upon these items.     Potentially problematic discrepancies with current MAR  o Patient IS taking the following which was not ordered upon admit  o Clonidine 0.1 mg/24hr patch 1 patch q7days    Please address this information as you see fit.  Feel free to contact us if you have any questions or require assistance.    Teodora Fernandez, PharmD, BCPS  [id number]     Facility-Administered Medications Prior to Admission   Medication    fluorescein 500 mg/5 mL (10 %) injection 500 mg     PTA Medications   Medication Sig    albuterol (VENTOLIN HFA) 90 mcg/actuation inhaler INHALE 2 PUFFS INTO THE LUNGS EVERY 6 (SIX) HOURS AS NEEDED FOR WHEEZING. RESCUE    albuterol-ipratropium (DUO-NEB) 2.5 mg-0.5 mg/3 mL nebulizer solution TAKE 1 VIAL BY NEBULIZATION EVERY 4 (FOUR) HOURS. RESCUE    aspirin (ENTERIC COATED ASPIRIN) 81 MG EC tablet Take 1 tablet by mouth once daily.     benzonatate (TESSALON PERLES) 100 MG capsule Take 2 capsules (200 mg total) by mouth 3 (three) times daily as needed for Cough.    blood sugar diagnostic (ACCU-CHEK ANGELICA) Strp Uses Accu-Check Angelica meter to test BG 4x/day    carvediloL (COREG) 25 MG tablet TAKE 2 TABLETS (50 MG TOTAL) BY MOUTH 2 (TWO) TIMES DAILY.    chlorthalidone (HYGROTEN) 25 MG Tab Take 0.5 tablets (12.5 mg total) by mouth once daily.    cholecalciferol, vitamin D3, (VITAMIN D3) 2,000 unit Tab Take 1 tablet by mouth once daily.     cloNIDine 0.1 mg/24 hr td ptwk (CATAPRES) 0.1 mg/24 hr PLACE 1 PATCH ONTO THE SKIN EVERY 7 DAYS.    CYANOCOBALAMIN, VITAMIN B-12, (B-12 DOTS ORAL) Take 1 tablet by mouth once daily.    diltiaZEM " (CARDIZEM CD) 180 MG 24 hr capsule Take 1 capsule (180 mg total) by mouth once daily.    fluticasone propionate (FLONASE) 50 mcg/actuation nasal spray 2 sprays (100 mcg total) by Each Nostril route once daily.    fluticasone-salmeterol diskus inhaler 500-50 mcg Inhale 1 puff into the lungs 2 (two) times daily. Controller    hydrALAZINE (APRESOLINE) 100 MG tablet Take 1 tablet (100 mg total) by mouth 3 (three) times daily.    insulin (LANTUS SOLOSTAR U-100 INSULIN) glargine 100 units/mL (3mL) SubQ pen Inject 34 Units into the skin every evening. Cancel the 30 unit prescription    irbesartan (AVAPRO) 300 MG tablet Take 1 tablet (300 mg total) by mouth every evening.    lancets (ACCU-CHEK SOFTCLIX LANCETS) Misc Uses Accu-Chek Angelica meter to test BG 4x/day (Patient taking differently: Uses Accu-Chek Angelica meter to test BG 1x/day)    levocetirizine (XYZAL) 5 MG tablet Take 1 tablet (5 mg total) by mouth every evening.    linaGLIPtin (TRADJENTA) 5 mg Tab tablet Take 1 tablet (5 mg total) by mouth once daily.    magnesium oxide (MAG-OX) 400 mg tablet Take 1 tablet (400 mg total) by mouth once daily.    metFORMIN (GLUCOPHAGE) 500 MG tablet Take 1 tablet (500 mg total) by mouth 2 (two) times daily with meals.    montelukast (SINGULAIR) 10 mg tablet Take 1 tablet (10 mg total) by mouth every evening.    nitroGLYCERIN (NITROSTAT) 0.4 MG SL tablet Place 0.4 mg under the tongue every 5 (five) minutes as needed for Chest pain.     omega-3 fatty acids (FISH OIL) 500 mg Cap Take 1 capsule by mouth Twice daily.    pravastatin (PRAVACHOL) 40 MG tablet TAKE 1 TABLET BY MOUTH EVERY DAY    [DISCONTINUED] amoxicillin-clavulanate 875-125mg (AUGMENTIN) 875-125 mg per tablet Take 1 tablet by mouth 2 (two) times daily.    [DISCONTINUED] azithromycin (Z-ERYN) 250 MG tablet 2 initially then one daily    [DISCONTINUED] doxycycline (VIBRAMYCIN) 100 MG Cap Take 1 capsule (100 mg total) by mouth every 12 (twelve) hours.     "acetaminophen (TYLENOL) 500 MG tablet Take 1,000 mg by mouth daily as needed for Pain.    pen needle, diabetic (BD ULTRA-FINE MINI PEN NEEDLE) 31 gauge x 3/16" Ndle USE WITH LANTUS AT BEDTIME     .    .            "

## 2020-12-09 NOTE — PLAN OF CARE
[facility] - ICU 14 WT    HOME HEALTH ORDERS  FACE TO FACE ENCOUNTER    Patient Name: Myesha Quinones  YOB: 1940    PCP: Emelina Gaston MD   PCP Address: [address] / [place] [address]  PCP Phone Number: [phone]  PCP Fax: [phone]    Encounter Date: 12/12/2020    Admit to Home Health    Diagnoses:  Active Hospital Problems    Diagnosis  POA    *Pneumonia due to COVID-19 virus [U07.1, J12.89]  Yes    CKD (chronic kidney disease) stage 4, GFR 15-29 ml/min [N18.4]  Yes    Acute hypoxemic respiratory failure [J96.01]  Yes    VIRGILIO (acute kidney injury) [N17.9]  Unknown    Uncontrolled type 2 diabetes mellitus with hyperglycemia [E11.65]  Yes    Coronary artery disease involving native coronary artery without angina pectoris - Non-obstructive 50% LAD [I25.10]  Yes    Hypertension associated with diabetes [E11.59, I10]  Yes    Asthma, chronic [J45.909]  Yes      Resolved Hospital Problems   No resolved problems to display.       Future Appointments   Date Time Provider Department Center   12/14/2020  9:00 AM INFUSION, CHAIR 9 [facility]   12/17/2020  9:30 AM Nargis Galvan PA-C [facility] CARDIO [facility]   12/21/2020  9:00 AM INFUSION, CHAIR 9 [facility]   12/28/2020  9:00 AM Erika Nguyen MD [facility] DERM [facility]   12/28/2020 10:30 AM INFUSION, CHAIR 9 [facility]   1/4/2021  9:00 AM INFUSION, CHAIR 9 [facility]   1/11/2021  9:00 AM INFUSION, CHAIR 9 [facility]   1/19/2021  9:00 AM INFUSION, CHAIR 9 [facility]   1/25/2021  9:00 AM INFUSION, CHAIR 6 [facility]   2/1/2021  9:00 AM INFUSION, CHAIR 3 [facility]   2/8/2021  9:00 AM INFUSION, CHAIR 1 [facility]   2/12/2021 10:00 AM HOME MONITOR DEVICE CHECK, [facility] NANDA [facility]   2/15/2021  9:00 AM INFUSION, CHAIR 1 [facility] EVGENY [facility]   3/15/2021  9:00 [facility]           I have seen and examined this patient face to face today. My clinical findings that support the need for the home health skilled services and home bound status are the following:  Weakness/numbness causing balance and gait disturbance due to Weakness/Debility and Respiratory failure making it taxing to leave home.    Allergies:  Review of patient's allergies indicates:   Allergen Reactions    Iodine and iodide containing products Hives    Nifedipine      weakness       Diet: diabetic diet: 2000 calorie    Activities: activity as tolerated    Nursing:   SN to complete comprehensive assessment including routine vital signs. Instruct on disease process and s/s of complications to report to MD. Review/verify medication list sent home with the patient at time of discharge  and instruct patient/caregiver as needed. Frequency may be adjusted depending on start of care date.    Notify MD if SBP > 160 or < 90; DBP > 90 or < 50; HR > 120 or < 50; Temp > 101; Other:       COVID SURVEILANCE     CONSULTS:    Physical Therapy to evaluate and treat. Evaluate for home safety and equipment needs; Establish/upgrade home exercise program. Perform / instruct on therapeutic exercises, gait training, transfer training, and Range of Motion.  Occupational Therapy to evaluate and treat. Evaluate home environment for safety and equipment needs. Perform/Instruct on transfers, ADL training, ROM, and therapeutic exercises.    MISCELLANEOUS CARE:  Home Oxygen:  Oxygen at 2 L/min nasal canula to be used:  As needed for SOB.    WOUND CARE ORDERS  n/a      Medications: Review discharge medications with patient and family and provide education.      Current Discharge Medication List      START taking these medications    Details   ascorbic acid, vitamin C, (VITAMIN C) 500 MG tablet Take 1 tablet (500 mg total) by mouth 2 (two) times daily. for 10 days      levoFLOXacin (LEVAQUIN) 500 MG tablet Take 1 tablet (500  mg total) by mouth once daily. for 1 day  Qty: 1 tablet, Refills: 1         CONTINUE these medications which have NOT CHANGED    Details   albuterol (VENTOLIN HFA) 90 mcg/actuation inhaler INHALE 2 PUFFS INTO THE LUNGS EVERY 6 (SIX) HOURS AS NEEDED FOR WHEEZING. RESCUE  Qty: 18 g, Refills: 12    Associated Diagnoses: Cough      albuterol-ipratropium (DUO-NEB) 2.5 mg-0.5 mg/3 mL nebulizer solution TAKE 1 VIAL BY NEBULIZATION EVERY 4 (FOUR) HOURS. RESCUE  Qty: 90 mL, Refills: 3      aspirin (ENTERIC COATED ASPIRIN) 81 MG EC tablet Take 1 tablet by mouth once daily.       benzonatate (TESSALON PERLES) 100 MG capsule Take 2 capsules (200 mg total) by mouth 3 (three) times daily as needed for Cough.  Qty: 90 capsule, Refills: 3    Associated Diagnoses: Mild intermittent chronic asthma without complication; Cough      blood sugar diagnostic (ACCU-CHEK HECTOR) Strp Uses Accu-Check Hector meter to test BG 4x/day  Qty: 400 strip, Refills: 6    Associated Diagnoses: Diabetic polyneuropathy associated with type 2 diabetes mellitus      carvediloL (COREG) 25 MG tablet TAKE 2 TABLETS (50 MG TOTAL) BY MOUTH 2 (TWO) TIMES DAILY.  Qty: 360 tablet, Refills: 3      chlorthalidone (HYGROTEN) 25 MG Tab Take 0.5 tablets (12.5 mg total) by mouth once daily.  Qty: 90 tablet, Refills: 1    Comments: .  Associated Diagnoses: Essential hypertension      cholecalciferol, vitamin D3, (VITAMIN D3) 2,000 unit Tab Take 1 tablet by mouth once daily.       cloNIDine 0.1 mg/24 hr td ptwk (CATAPRES) 0.1 mg/24 hr PLACE 1 PATCH ONTO THE SKIN EVERY 7 DAYS.  Qty: 12 patch, Refills: 3    Comments: DX Code Needed  .  Associated Diagnoses: Essential hypertension      CYANOCOBALAMIN, VITAMIN B-12, (B-12 DOTS ORAL) Take 1 tablet by mouth once daily.      diltiaZEM (CARDIZEM CD) 180 MG 24 hr capsule Take 1 capsule (180 mg total) by mouth once daily.  Qty: 90 capsule, Refills: 3    Comments: .  Associated Diagnoses: Type 2 DM with CKD stage 3 and hypertension       fluticasone propionate (FLONASE) 50 mcg/actuation nasal spray 2 sprays (100 mcg total) by Each Nostril route once daily.  Qty: 16 g, Refills: 12      fluticasone-salmeterol diskus inhaler 500-50 mcg Inhale 1 puff into the lungs 2 (two) times daily. Controller  Qty: 60 each, Refills: 11    Comments: If Advair is more cost-effective than Wixela ok to dispense      hydrALAZINE (APRESOLINE) 100 MG tablet Take 1 tablet (100 mg total) by mouth 3 (three) times daily.  Qty: 270 tablet, Refills: 2    Comments: .  Associated Diagnoses: Essential hypertension      insulin (LANTUS SOLOSTAR U-100 INSULIN) glargine 100 units/mL (3mL) SubQ pen Inject 34 Units into the skin every evening. Cancel the 30 unit prescription  Qty: 1 Box, Refills: 12    Associated Diagnoses: Diabetic polyneuropathy associated with type 2 diabetes mellitus      irbesartan (AVAPRO) 300 MG tablet Take 1 tablet (300 mg total) by mouth every evening.  Qty: 90 tablet, Refills: 3    Comments: .  Associated Diagnoses: Nonischemic cardiomyopathy; Essential hypertension      lancets (ACCU-CHEK SOFTCLIX LANCETS) Misc Uses Accu-Chek Angelica meter to test BG 4x/day  Qty: 400 each, Refills: 3    Associated Diagnoses: Diabetic polyneuropathy associated with type 2 diabetes mellitus      levocetirizine (XYZAL) 5 MG tablet Take 1 tablet (5 mg total) by mouth every evening.  Qty: 30 tablet, Refills: 11      linaGLIPtin (TRADJENTA) 5 mg Tab tablet Take 1 tablet (5 mg total) by mouth once daily.  Qty: 90 tablet, Refills: 3    Associated Diagnoses: Uncontrolled type 2 diabetes mellitus with hyperglycemia      magnesium oxide (MAG-OX) 400 mg tablet Take 1 tablet (400 mg total) by mouth once daily.  Refills: 0      metFORMIN (GLUCOPHAGE) 500 MG tablet Take 1 tablet (500 mg total) by mouth 2 (two) times daily with meals.  Qty: 180 tablet, Refills: 6    Associated Diagnoses: Diabetic polyneuropathy associated with type 2 diabetes mellitus      montelukast (SINGULAIR) 10 mg  "tablet Take 1 tablet (10 mg total) by mouth every evening.  Qty: 30 tablet, Refills: 11      nitroGLYCERIN (NITROSTAT) 0.4 MG SL tablet Place 0.4 mg under the tongue every 5 (five) minutes as needed for Chest pain.       omega-3 fatty acids (FISH OIL) 500 mg Cap Take 1 capsule by mouth Twice daily.      pravastatin (PRAVACHOL) 40 MG tablet TAKE 1 TABLET BY MOUTH EVERY DAY  Qty: 90 tablet, Refills: 3    Associated Diagnoses: Mixed hyperlipidemia      acetaminophen (TYLENOL) 500 MG tablet Take 1,000 mg by mouth daily as needed for Pain.      pen needle, diabetic (BD ULTRA-FINE MINI PEN NEEDLE) 31 gauge x 3/16" Ndle USE WITH LANTUS AT BEDTIME  Qty: 400 each, Refills: 3    Associated Diagnoses: Diabetic polyneuropathy associated with type 2 diabetes mellitus         STOP taking these medications       amoxicillin-clavulanate 875-125mg (AUGMENTIN) 875-125 mg per tablet Comments:   Reason for Stopping:         azithromycin (Z-ERYN) 250 MG tablet Comments:   Reason for Stopping:         doxycycline (VIBRAMYCIN) 100 MG Cap Comments:   Reason for Stopping:               I certify that this patient is confined to her home and needs physical therapy and occupational therapy.        "

## 2020-12-09 NOTE — NURSING
Pt in chair on RA- 94%  Ambulating 4 min - dropped to 88%  Recovered in chair on RA- 93%    Tolerated ambulation for only 4 min. But recovered quickly. Resting comfortably in chair on RA, 93%.

## 2020-12-10 LAB
ALBUMIN SERPL BCP-MCNC: 2.5 G/DL (ref 3.5–5.2)
ALP SERPL-CCNC: 64 U/L (ref 55–135)
ALT SERPL W/O P-5'-P-CCNC: 21 U/L (ref 10–44)
ANION GAP SERPL CALC-SCNC: 13 MMOL/L (ref 8–16)
AST SERPL-CCNC: 27 U/L (ref 10–40)
BACTERIA SPEC AEROBE CULT: ABNORMAL
BACTERIA SPEC AEROBE CULT: ABNORMAL
BASOPHILS # BLD AUTO: 0.01 K/UL (ref 0–0.2)
BASOPHILS NFR BLD: 0.2 % (ref 0–1.9)
BILIRUB SERPL-MCNC: 0.1 MG/DL (ref 0.1–1)
BUN SERPL-MCNC: 80 MG/DL (ref 8–23)
CALCIUM SERPL-MCNC: 8.5 MG/DL (ref 8.7–10.5)
CHLORIDE SERPL-SCNC: 104 MMOL/L (ref 95–110)
CO2 SERPL-SCNC: 16 MMOL/L (ref 23–29)
CREAT SERPL-MCNC: 2.4 MG/DL (ref 0.5–1.4)
CRP SERPL-MCNC: 30.2 MG/L (ref 0–8.2)
D DIMER PPP IA.FEU-MCNC: 1.43 MG/L FEU
DIFFERENTIAL METHOD: ABNORMAL
EOSINOPHIL # BLD AUTO: 0 K/UL (ref 0–0.5)
EOSINOPHIL NFR BLD: 0 % (ref 0–8)
ERYTHROCYTE [DISTWIDTH] IN BLOOD BY AUTOMATED COUNT: 13.2 % (ref 11.5–14.5)
EST. GFR  (AFRICAN AMERICAN): 21.2 ML/MIN/1.73 M^2
EST. GFR  (NON AFRICAN AMERICAN): 18.4 ML/MIN/1.73 M^2
FERRITIN SERPL-MCNC: 121 NG/ML (ref 20–300)
GLUCOSE SERPL-MCNC: 245 MG/DL (ref 70–110)
GRAM STN SPEC: ABNORMAL
HCT VFR BLD AUTO: 30 % (ref 37–48.5)
HGB BLD-MCNC: 9 G/DL (ref 12–16)
IMM GRANULOCYTES # BLD AUTO: 0.05 K/UL (ref 0–0.04)
IMM GRANULOCYTES NFR BLD AUTO: 0.9 % (ref 0–0.5)
LDH SERPL L TO P-CCNC: 195 U/L (ref 110–260)
LYMPHOCYTES # BLD AUTO: 0.9 K/UL (ref 1–4.8)
LYMPHOCYTES NFR BLD: 14.7 % (ref 18–48)
MAGNESIUM SERPL-MCNC: 1.8 MG/DL (ref 1.6–2.6)
MCH RBC QN AUTO: 27.9 PG (ref 27–31)
MCHC RBC AUTO-ENTMCNC: 30 G/DL (ref 32–36)
MCV RBC AUTO: 93 FL (ref 82–98)
MONOCYTES # BLD AUTO: 0.3 K/UL (ref 0.3–1)
MONOCYTES NFR BLD: 5.3 % (ref 4–15)
NEUTROPHILS # BLD AUTO: 4.6 K/UL (ref 1.8–7.7)
NEUTROPHILS NFR BLD: 78.9 % (ref 38–73)
NRBC BLD-RTO: 0 /100 WBC
PHOSPHATE SERPL-MCNC: 4.1 MG/DL (ref 2.7–4.5)
PLATELET # BLD AUTO: 218 K/UL (ref 150–350)
PMV BLD AUTO: 11.4 FL (ref 9.2–12.9)
POCT GLUCOSE: 210 MG/DL (ref 70–110)
POCT GLUCOSE: 405 MG/DL (ref 70–110)
POCT GLUCOSE: 461 MG/DL (ref 70–110)
POTASSIUM SERPL-SCNC: 4.4 MMOL/L (ref 3.5–5.1)
PROT SERPL-MCNC: 6.2 G/DL (ref 6–8.4)
RBC # BLD AUTO: 3.23 M/UL (ref 4–5.4)
SODIUM SERPL-SCNC: 133 MMOL/L (ref 136–145)
SODIUM UR-SCNC: 58 MMOL/L (ref 20–250)
WBC # BLD AUTO: 5.8 K/UL (ref 3.9–12.7)

## 2020-12-10 PROCEDURE — 63600175 PHARM REV CODE 636 W HCPCS: Mod: HCNC | Performed by: STUDENT IN AN ORGANIZED HEALTH CARE EDUCATION/TRAINING PROGRAM

## 2020-12-10 PROCEDURE — 86140 C-REACTIVE PROTEIN: CPT | Mod: HCNC

## 2020-12-10 PROCEDURE — 85379 FIBRIN DEGRADATION QUANT: CPT | Mod: HCNC

## 2020-12-10 PROCEDURE — 80053 COMPREHEN METABOLIC PANEL: CPT | Mod: HCNC

## 2020-12-10 PROCEDURE — 85025 COMPLETE CBC W/AUTO DIFF WBC: CPT | Mod: HCNC

## 2020-12-10 PROCEDURE — 83615 LACTATE (LD) (LDH) ENZYME: CPT | Mod: HCNC

## 2020-12-10 PROCEDURE — 94640 AIRWAY INHALATION TREATMENT: CPT | Mod: HCNC

## 2020-12-10 PROCEDURE — 36415 COLL VENOUS BLD VENIPUNCTURE: CPT | Mod: HCNC

## 2020-12-10 PROCEDURE — 25000003 PHARM REV CODE 250: Mod: HCNC | Performed by: STUDENT IN AN ORGANIZED HEALTH CARE EDUCATION/TRAINING PROGRAM

## 2020-12-10 PROCEDURE — 94664 DEMO&/EVAL PT USE INHALER: CPT | Mod: HCNC

## 2020-12-10 PROCEDURE — 94761 N-INVAS EAR/PLS OXIMETRY MLT: CPT | Mod: HCNC

## 2020-12-10 PROCEDURE — 82570 ASSAY OF URINE CREATININE: CPT | Mod: HCNC

## 2020-12-10 PROCEDURE — 84300 ASSAY OF URINE SODIUM: CPT | Mod: HCNC

## 2020-12-10 PROCEDURE — 11000001 HC ACUTE MED/SURG PRIVATE ROOM: Mod: HCNC

## 2020-12-10 PROCEDURE — 99233 PR SUBSEQUENT HOSPITAL CARE,LEVL III: ICD-10-PCS | Mod: HCNC,GC,, | Performed by: HOSPITALIST

## 2020-12-10 PROCEDURE — 99900035 HC TECH TIME PER 15 MIN (STAT): Mod: HCNC

## 2020-12-10 PROCEDURE — 94799 UNLISTED PULMONARY SVC/PX: CPT | Mod: HCNC

## 2020-12-10 PROCEDURE — 83735 ASSAY OF MAGNESIUM: CPT | Mod: HCNC

## 2020-12-10 PROCEDURE — 99233 SBSQ HOSP IP/OBS HIGH 50: CPT | Mod: HCNC,GC,, | Performed by: HOSPITALIST

## 2020-12-10 PROCEDURE — 82728 ASSAY OF FERRITIN: CPT | Mod: HCNC

## 2020-12-10 PROCEDURE — 84100 ASSAY OF PHOSPHORUS: CPT | Mod: HCNC

## 2020-12-10 PROCEDURE — 27000221 HC OXYGEN, UP TO 24 HOURS: Mod: HCNC

## 2020-12-10 RX ORDER — SODIUM CHLORIDE, SODIUM LACTATE, POTASSIUM CHLORIDE, CALCIUM CHLORIDE 600; 310; 30; 20 MG/100ML; MG/100ML; MG/100ML; MG/100ML
INJECTION, SOLUTION INTRAVENOUS CONTINUOUS
Status: ACTIVE | OUTPATIENT
Start: 2020-12-10 | End: 2020-12-10

## 2020-12-10 RX ORDER — INSULIN ASPART 100 [IU]/ML
12 INJECTION, SOLUTION INTRAVENOUS; SUBCUTANEOUS
Status: DISCONTINUED | OUTPATIENT
Start: 2020-12-10 | End: 2020-12-11

## 2020-12-10 RX ORDER — LEVOFLOXACIN 500 MG/1
500 TABLET, FILM COATED ORAL EVERY OTHER DAY
Status: DISCONTINUED | OUTPATIENT
Start: 2020-12-11 | End: 2020-12-12 | Stop reason: HOSPADM

## 2020-12-10 RX ORDER — INSULIN ASPART 100 [IU]/ML
10 INJECTION, SOLUTION INTRAVENOUS; SUBCUTANEOUS
Status: DISCONTINUED | OUTPATIENT
Start: 2020-12-10 | End: 2020-12-10

## 2020-12-10 RX ORDER — HEPARIN SODIUM 5000 [USP'U]/ML
5000 INJECTION, SOLUTION INTRAVENOUS; SUBCUTANEOUS EVERY 8 HOURS
Status: DISCONTINUED | OUTPATIENT
Start: 2020-12-10 | End: 2020-12-12 | Stop reason: HOSPADM

## 2020-12-10 RX ORDER — INSULIN ASPART 100 [IU]/ML
12 INJECTION, SOLUTION INTRAVENOUS; SUBCUTANEOUS
Status: DISCONTINUED | OUTPATIENT
Start: 2020-12-10 | End: 2020-12-10

## 2020-12-10 RX ADMIN — HYDRALAZINE HYDROCHLORIDE 100 MG: 50 TABLET, FILM COATED ORAL at 03:12

## 2020-12-10 RX ADMIN — INSULIN ASPART 4 UNITS: 100 INJECTION, SOLUTION INTRAVENOUS; SUBCUTANEOUS at 08:12

## 2020-12-10 RX ADMIN — FLUTICASONE FUROATE AND VILANTEROL TRIFENATATE 1 PUFF: 200; 25 POWDER RESPIRATORY (INHALATION) at 07:12

## 2020-12-10 RX ADMIN — ALBUTEROL SULFATE 2 PUFF: 90 AEROSOL, METERED RESPIRATORY (INHALATION) at 01:12

## 2020-12-10 RX ADMIN — INSULIN ASPART 12 UNITS: 100 INJECTION, SOLUTION INTRAVENOUS; SUBCUTANEOUS at 04:12

## 2020-12-10 RX ADMIN — INSULIN DETEMIR 24 UNITS: 100 INJECTION, SOLUTION SUBCUTANEOUS at 10:12

## 2020-12-10 RX ADMIN — MONTELUKAST 10 MG: 10 TABLET, FILM COATED ORAL at 09:12

## 2020-12-10 RX ADMIN — ALBUTEROL SULFATE 2 PUFF: 90 AEROSOL, METERED RESPIRATORY (INHALATION) at 12:12

## 2020-12-10 RX ADMIN — HEPARIN SODIUM 5000 UNITS: 5000 INJECTION INTRAVENOUS; SUBCUTANEOUS at 09:12

## 2020-12-10 RX ADMIN — INSULIN ASPART 11 UNITS: 100 INJECTION, SOLUTION INTRAVENOUS; SUBCUTANEOUS at 08:12

## 2020-12-10 RX ADMIN — SODIUM CHLORIDE, SODIUM LACTATE, POTASSIUM CHLORIDE, AND CALCIUM CHLORIDE: .6; .31; .03; .02 INJECTION, SOLUTION INTRAVENOUS at 12:12

## 2020-12-10 RX ADMIN — ASPIRIN 81 MG: 81 TABLET, COATED ORAL at 09:12

## 2020-12-10 RX ADMIN — REMDESIVIR 100 MG: 100 INJECTION, POWDER, LYOPHILIZED, FOR SOLUTION INTRAVENOUS at 06:12

## 2020-12-10 RX ADMIN — HYDRALAZINE HYDROCHLORIDE 100 MG: 50 TABLET, FILM COATED ORAL at 09:12

## 2020-12-10 RX ADMIN — Medication 2000 UNITS: at 10:12

## 2020-12-10 RX ADMIN — Medication 500 MG: at 09:12

## 2020-12-10 RX ADMIN — CARVEDILOL 50 MG: 25 TABLET, FILM COATED ORAL at 09:12

## 2020-12-10 RX ADMIN — INSULIN ASPART 10 UNITS: 100 INJECTION, SOLUTION INTRAVENOUS; SUBCUTANEOUS at 12:12

## 2020-12-10 RX ADMIN — INSULIN DETEMIR 24 UNITS: 100 INJECTION, SOLUTION SUBCUTANEOUS at 09:12

## 2020-12-10 RX ADMIN — DEXAMETHASONE 6 MG: 4 TABLET ORAL at 09:12

## 2020-12-10 RX ADMIN — INSULIN ASPART 5 UNITS: 100 INJECTION, SOLUTION INTRAVENOUS; SUBCUTANEOUS at 09:12

## 2020-12-10 RX ADMIN — MUPIROCIN: 20 OINTMENT TOPICAL at 10:12

## 2020-12-10 RX ADMIN — INSULIN ASPART 10 UNITS: 100 INJECTION, SOLUTION INTRAVENOUS; SUBCUTANEOUS at 04:12

## 2020-12-10 RX ADMIN — BENZONATATE 200 MG: 100 CAPSULE ORAL at 05:12

## 2020-12-10 RX ADMIN — ALBUTEROL SULFATE 2 PUFF: 90 AEROSOL, METERED RESPIRATORY (INHALATION) at 07:12

## 2020-12-10 RX ADMIN — MUPIROCIN: 20 OINTMENT TOPICAL at 09:12

## 2020-12-10 RX ADMIN — CETIRIZINE HYDROCHLORIDE 5 MG: 5 TABLET ORAL at 09:12

## 2020-12-10 RX ADMIN — DILTIAZEM HYDROCHLORIDE 180 MG: 180 CAPSULE, COATED, EXTENDED RELEASE ORAL at 09:12

## 2020-12-10 RX ADMIN — PRAVASTATIN SODIUM 40 MG: 40 TABLET ORAL at 09:12

## 2020-12-10 NOTE — ASSESSMENT & PLAN NOTE
COVID-19 Virus Infection  Viral Pneumonia due to COVID-19  - COVID-19 testing: Collection Date: 12/1/2020 Collection Time:   9:22 AM   - Isolation: Airborne/Droplet. Surgical mask on patient. Notify Infection Control  - Diagnostics: Trend Q48hrs if stable, more frequently if patient decompensating       - CBC       - CMP       - Mg        - D-dimer       - Ferritin       - LDH    Lymphopenia, hyponatremia, hyperferritinemia, elevated troponin, elevated d-dimer, age, and medical comorbidities are significant predictors of poor clinical outcome    - Management: Per [facility] COVID Treatment Protocol    - Telemetry & Continuous Pulse Oximetry    - Nutrition:        - Multivitamin PO daily - pt allergic to iodine       - Boost supplement       - Vitamin D 2000IU daily - home dose        - Ascorbic acid 500mg PO bid    - Supportive Care:       - acetaminophen 650mg PO Q6hr PRN fever/headache       - loperamide PRN viral diarrhea       - IVF if indicated, restrictive strategy preferred, no maintenance IV if able       - VTE PPx: enoxaparin 30 given low CrCl (13mL/min)    - Antibiotics:       - azithromycin 250mg po daily x 2 days - patient was prescribed Zpack on the 3rd (d2)       - can add ceftriaxone of there is further concern for concomitant bacterial PNA     - Remdesivir (d3)     - Dexamethasone 6mg daily x 10 days (d4)      Acute Hypoxemic Respiratory Failure    - Order RT consult via Respiratory Communication for COVID Protocols    - Incentive Spirometer Q4h, Flutter Valve Q4h    - Continuous Pulse Oximetry, goal SpO2 92-96%    - Supplemental O2 via LFNC, VentiMask, or HFNC (see Respiratory Support Oxygen Therapies)    - If wheezing, albuterol INH Q6h scheduled & PRN    - Proning Protocol if patient is a candidate (see  Proning Protocol)   - GCS >13, able to self-prone    - If deterioration, may warrant trial of NIPPV in neg pressure room or immediate ICU consult    On 12/9 patient able to tolerate RA satting >95.  Failed 6MWT (desstated to 88% with ambulation but recovers quickly on RA). Patient going back and forth between RA and 1-2 L NC,  Approved for home O2. resp cultures positive for pseudomonas, levofloxacin started 750 (then changed to 500 since VIRGILIO)

## 2020-12-10 NOTE — PLAN OF CARE
Plan of care discussed with pt. Pt AOx4. Ambulatory. No issues overnight VS stable on room air. Frequent coughing spell this morning O2 drop to 84%, placed on oxygen and quickly improved.  overnight.  Pt denies CP, SOB, or pain/discomfort at this time.Pt free of injuries this shift.  All questions addressed.  Will continue to monitor.

## 2020-12-10 NOTE — PLAN OF CARE
Patient medically not ready for discharge.  New VIRGILIO possibly due to poor intake, will receive fluids and analyze urine.  Episodes of desatting to low 80s overnight while on RA. Needed to go back to 2 L NC.  Remdesivir (d3)  Dexamethasone 6mg daily x 10 days (d4)    When medically stable, anticipate discharge plan  - home with family and home health.    CM to follow for discharge planning needs.  IRMA Artis RN  Case Management  [id number]      12/10/20 [id number]   Discharge Reassessment   Assessment Type Discharge Planning Reassessment   Provided patient/caregiver education on the expected discharge date and the discharge plan Yes   Do you have any problems affording any of your prescribed medications? TBD   Discharge Plan A Home with family;Home Health   Discharge Plan B Home with family;Home Health   DME Needed Upon Discharge  oxygen;other (see comments)  (TBD)   Anticipated Discharge Disposition Home-Health   Can the patient/caregiver answer the patient profile reliably? Yes, cognitively intact   Describe the patient's ability to walk at the present time. Minor restrictions or changes   Number of comorbid conditions (as recorded on the chart) Four   Post-Acute Status   Post-Acute Authorization Home Health   Home Health Status Awaiting Internal Medical Clearance   Discharge Delays None known at this time

## 2020-12-10 NOTE — SUBJECTIVE & OBJECTIVE
Interval History: episodes of desatting to low 80s overnight while on RA. Needed to go back to 2 L NC.     Review of Systems   Constitutional: Negative for chills, fatigue and fever.   HENT: Negative for congestion, rhinorrhea and sore throat.    Eyes: Negative for pain and visual disturbance.   Respiratory: Positive for cough and shortness of breath. Negative for apnea and wheezing.    Cardiovascular: Negative for chest pain, palpitations and leg swelling.   Gastrointestinal: Negative for abdominal pain, diarrhea, nausea and vomiting.   Genitourinary: Negative for difficulty urinating, flank pain and hematuria.   Musculoskeletal: Negative for myalgias.   Skin: Negative for rash.   Neurological: Negative for light-headedness and headaches.   Psychiatric/Behavioral: Negative for agitation, behavioral problems, confusion and decreased concentration. The patient is nervous/anxious.      Objective:     Vital Signs (Most Recent):  Temp: 97.8 °F (36.6 °C) (12/10/20 0810)  Pulse: 74 (12/10/20 1345)  Resp: (!) 26 (12/10/20 1345)  BP: (!) 170/75 (12/10/20 0810)  SpO2: (!) 91 % (12/10/20 1345) Vital Signs (24h Range):  Temp:  [97.4 °F (36.3 °C)-97.9 °F (36.6 °C)] 97.8 °F (36.6 °C)  Pulse:  [61-80] 74  Resp:  [18-51] 26  SpO2:  [84 %-96 %] 91 %  BP: (118-182)/(59-84) 170/75     Weight: 67.6 kg (149 lb 0.5 oz)  Body mass index is 26.4 kg/m².    Intake/Output Summary (Last 24 hours) at 12/10/2020 1354  Last data filed at 12/10/2020 0600  Gross per 24 hour   Intake 360 ml   Output --   Net 360 ml      Physical Exam  Vitals signs and nursing note reviewed.   Constitutional:       Appearance: Normal appearance.   HENT:      Head: Normocephalic and atraumatic.      Nose: Nose normal. No congestion or rhinorrhea.      Mouth/Throat:      Mouth: Mucous membranes are moist.      Pharynx: Oropharynx is clear.   Eyes:      Extraocular Movements: Extraocular movements intact.      Conjunctiva/sclera: Conjunctivae normal.      Pupils:  Pupils are equal, round, and reactive to light.   Neck:      Musculoskeletal: Normal range of motion and neck supple.   Cardiovascular:      Rate and Rhythm: Normal rate and regular rhythm.      Heart sounds: No murmur.   Pulmonary:      Effort: No respiratory distress.      Breath sounds: No stridor. Wheezing present. No rhonchi.   Abdominal:      General: Abdomen is flat. There is no distension.      Palpations: Abdomen is soft.      Tenderness: There is no abdominal tenderness.   Musculoskeletal: Normal range of motion.         General: No swelling, deformity or signs of injury.   Skin:     General: Skin is warm and dry.      Capillary Refill: Capillary refill takes less than 2 seconds.   Neurological:      General: No focal deficit present.      Mental Status: She is alert and oriented to person, place, and time.         Significant Labs:   CBC:   Recent Labs   Lab 12/09/20  0923 12/10/20  0259   WBC 9.57 5.80   HGB 9.6* 9.0*   HCT 32.3* 30.0*    218     CMP:   Recent Labs   Lab 12/09/20  0923 12/10/20  0259   * 133*   K 4.2 4.4    104   CO2 15* 16*   * 245*   BUN 60* 80*   CREATININE 1.9* 2.4*   CALCIUM 8.8 8.5*   PROT 6.9 6.2   ALBUMIN 2.7* 2.5*   BILITOT 0.2 0.1   ALKPHOS 72 64   AST 23 27   ALT 16 21   ANIONGAP 15 13   EGFRNONAA 24.4* 18.4*     All pertinent labs within the past 24 hours have been reviewed.    Significant Imaging: I have reviewed and interpreted all pertinent imaging results/findings within the past 24 hours.

## 2020-12-10 NOTE — PROGRESS NOTES
[facility] - ICU 14 [facility]  Progress Note    Patient Name: Myesha Quinones  MRN: [id number]  Patient Class: IP- Inpatient   Admission Date: 12/6/2020  Length of Stay: 4 days  Attending Physician: Onel Acosta MD  Primary Care Provider: Emelina Gaston MD        Subjective:     Principal Problem:Pneumonia due to COVID-19 virus        HPI:  This is an 80 year old female with pmhx of Asthma, CAD, ischemic cardiomyopathy, CHF, CKD3, insulin dependent T2DM,  breast cancer, HTN, HLD who was tested positive for COVID after her daughter and  had tested positive for it last week. She was initially sent home with normal vitals and examination. She was diagnosed on the 1st of this month. Today she presents with fevers, increased fatigue, loose stools, and dyspnea. She has a chronic cough with occasional green tinged sputum but is now having clear sputum. She called EMS and was found to be satting in the low 80's and responded to oxygen supplementation. Otherwise she does not have any other complaints. Denies any chest pain, headache, nausea, vomiting.    In the ED she was placed on 8L NC with saturations ~99%, hypertensive 161/67, afebrile. Laboratory evaluation revealed chronic anemia, mild hyponatremia. Imaging with x-ray of the chest revealed stable findings.    Overview/Hospital Course:  Patient was admitted for acute hypoxic resp failure 2/2 to COVID-19 PNA. She completed azithromycin that was prescribed outpatient. Wjhile here she received dexamethasone and remdesivir. Patient is on room air and appears to be doing well. Renal function remained stable. Failed 6MWT (desstated to 88% with ambulation but recovers quickly on RA). Patient going back and forth between RA and 1-2 L NC,  Approved for home O2. New VIRGILIO possibly due to poor intake, will give fluids and analyze urine.      Interval History: episodes of desatting to low 80s overnight while on RA. Needed to go back to 2 L NC.      Review of Systems   Constitutional: Negative for chills, fatigue and fever.   HENT: Negative for congestion, rhinorrhea and sore throat.    Eyes: Negative for pain and visual disturbance.   Respiratory: Positive for cough and shortness of breath. Negative for apnea and wheezing.    Cardiovascular: Negative for chest pain, palpitations and leg swelling.   Gastrointestinal: Negative for abdominal pain, diarrhea, nausea and vomiting.   Genitourinary: Negative for difficulty urinating, flank pain and hematuria.   Musculoskeletal: Negative for myalgias.   Skin: Negative for rash.   Neurological: Negative for light-headedness and headaches.   Psychiatric/Behavioral: Negative for agitation, behavioral problems, confusion and decreased concentration. The patient is nervous/anxious.      Objective:     Vital Signs (Most Recent):  Temp: 97.8 °F (36.6 °C) (12/10/20 0810)  Pulse: 74 (12/10/20 1345)  Resp: (!) 26 (12/10/20 1345)  BP: (!) 170/75 (12/10/20 0810)  SpO2: (!) 91 % (12/10/20 1345) Vital Signs (24h Range):  Temp:  [97.4 °F (36.3 °C)-97.9 °F (36.6 °C)] 97.8 °F (36.6 °C)  Pulse:  [61-80] 74  Resp:  [18-51] 26  SpO2:  [84 %-96 %] 91 %  BP: (118-182)/(59-84) 170/75     Weight: 67.6 kg (149 lb 0.5 oz)  Body mass index is 26.4 kg/m².    Intake/Output Summary (Last 24 hours) at 12/10/2020 1354  Last data filed at 12/10/2020 0600  Gross per 24 hour   Intake 360 ml   Output --   Net 360 ml      Physical Exam  Vitals signs and nursing note reviewed.   Constitutional:       Appearance: Normal appearance.   HENT:      Head: Normocephalic and atraumatic.      Nose: Nose normal. No congestion or rhinorrhea.      Mouth/Throat:      Mouth: Mucous membranes are moist.      Pharynx: Oropharynx is clear.   Eyes:      Extraocular Movements: Extraocular movements intact.      Conjunctiva/sclera: Conjunctivae normal.      Pupils: Pupils are equal, round, and reactive to light.   Neck:      Musculoskeletal: Normal range of motion and  neck supple.   Cardiovascular:      Rate and Rhythm: Normal rate and regular rhythm.      Heart sounds: No murmur.   Pulmonary:      Effort: No respiratory distress.      Breath sounds: No stridor. Wheezing present. No rhonchi.   Abdominal:      General: Abdomen is flat. There is no distension.      Palpations: Abdomen is soft.      Tenderness: There is no abdominal tenderness.   Musculoskeletal: Normal range of motion.         General: No swelling, deformity or signs of injury.   Skin:     General: Skin is warm and dry.      Capillary Refill: Capillary refill takes less than 2 seconds.   Neurological:      General: No focal deficit present.      Mental Status: She is alert and oriented to person, place, and time.         Significant Labs:   CBC:   Recent Labs   Lab 12/09/20  0923 12/10/20  0259   WBC 9.57 5.80   HGB 9.6* 9.0*   HCT 32.3* 30.0*    218     CMP:   Recent Labs   Lab 12/09/20  0923 12/10/20  0259   * 133*   K 4.2 4.4    104   CO2 15* 16*   * 245*   BUN 60* 80*   CREATININE 1.9* 2.4*   CALCIUM 8.8 8.5*   PROT 6.9 6.2   ALBUMIN 2.7* 2.5*   BILITOT 0.2 0.1   ALKPHOS 72 64   AST 23 27   ALT 16 21   ANIONGAP 15 13   EGFRNONAA 24.4* 18.4*     All pertinent labs within the past 24 hours have been reviewed.    Significant Imaging: I have reviewed and interpreted all pertinent imaging results/findings within the past 24 hours.      Assessment/Plan:      * Pneumonia due to COVID-19 virus  COVID-19 Virus Infection  Viral Pneumonia due to COVID-19  - COVID-19 testing: Collection Date: 12/1/2020 Collection Time:   9:22 AM   - Isolation: Airborne/Droplet. Surgical mask on patient. Notify Infection Control  - Diagnostics: Trend Q48hrs if stable, more frequently if patient decompensating       - CBC       - CMP       - Mg        - D-dimer       - Ferritin       - LDH    Lymphopenia, hyponatremia, hyperferritinemia, elevated troponin, elevated d-dimer, age, and medical comorbidities are  significant predictors of poor clinical outcome    - [facility]    - Telemetry & Continuous Pulse Oximetry    - Nutrition:        - Multivitamin PO daily - pt allergic to iodine       - Boost supplement       - Vitamin D 2000IU daily - home dose        - Ascorbic acid 500mg PO bid    - Supportive Care:       - acetaminophen 650mg PO Q6hr PRN fever/headache       - loperamide PRN viral diarrhea       - IVF if indicated, restrictive strategy preferred, no maintenance IV if able       - VTE PPx: enoxaparin 30 given low CrCl (13mL/min)    - Antibiotics:       - azithromycin 250mg po daily x 2 days - patient was prescribed Zpack on the 3rd (d2)       - can add ceftriaxone of there is further concern for concomitant bacterial PNA     - Remdesivir (d3)     - Dexamethasone 6mg daily x 10 days (d4)      Acute Hypoxemic Respiratory Failure    - Order RT consult via Respiratory Communication for COVID Protocols    - Incentive Spirometer Q4h, Flutter Valve Q4h    - Continuous Pulse Oximetry, goal SpO2 92-96%    - Supplemental O2 via LFNC, VentiMask, or HFNC (see Respiratory Support Oxygen Therapies)    - If wheezing, albuterol INH Q6h scheduled & PRN    - Proning Protocol if patient is a candidate (see  Proning Protocol)   - GCS >13, able to self-prone    - If deterioration, may warrant trial of NIPPV in neg pressure room or immediate ICU consult    On 12/9 patient able to tolerate RA satting >95. Failed 6MWT (desstated to 88% with ambulation but recovers quickly on RA). Patient going back and forth between RA and 1-2 L NC,  Approved for home O2. resp cultures positive for pseudomonas, levofloxacin started 750 (then changed to 500 since VIRGILIO)        CKD (chronic kidney disease) stage 4, GFR 15-29 ml/min  Baseline Cr noted to be ~1.6, most recently in the last 2 months Cr at 1.8    - Renally dose all medications to current GFR  - Avoid nephrotoxic meds/agents such as NSAIDs, gadolinium and  IV radiocontrast  - Strict intake and output and daily standing weights  - Trend daily labs        VIRGILIO (acute kidney injury)  On 12/10 patient noted to have a creatinine of 2.4 and BUN of 80. Possibly due to poor intake (pre renal) or ATN (remdesivir)    Plan:    - U/A, Fena ordered  -  Infusion x 10 H   - Renally dose all medications to current GFR  - Avoid nephrotoxic meds/agents such as NSAIDs, gadolinium and IV radiocontrast  - Strict intake and output and daily standing weights  - Trend daily labs      Uncontrolled type 2 diabetes mellitus with hyperglycemia  HOME MED = linagliptin, metformin, Lantus 34U QHS  Hemoglobin A1C   Date Value Ref Range Status   11/16/2020 6.9 (H) 4.0 - 5.6 % Final     Comment:     ADA Screening Guidelines:  5.7-6.4%  Consistent with prediabetes  >or=6.5%  Consistent with diabetes  High levels of fetal hemoglobin interfere with the HbA1C  assay. Heterozygous hemoglobin variants (HbS, HgC, etc)do  not significantly interfere with this assay.   However, presence of multiple variants may affect accuracy.            PLAN:  - on 24 U detemir BID and 10 U aspart TIDWM. Closely following glucose checks. Will transition to insulin drip if unimproved  - MDSSI, POCT glucose AC/HS  - diabetic diet      Hypertension associated with diabetes  HOME MEDS = carvedilol 50 mg BID, irbesartan 300 mg qday, diltiazem 180 mg qday, chlorthalidone 12.5 mg qday, hydralazine 100 mg TID, clonidine patch 0.1 mg/24 h (taken weekly)     PLAN:  - hold chlorthalidone in context of COVID infection, continue other home medications  - on weekly clonidine patch  - ibersartan held since VIRGILIO       Coronary artery disease involving native coronary artery without angina pectoris - Non-obstructive 50% LAD  - home ASA  - continue home pravastatin      Asthma, chronic    - continue scheduled Albuterol q6h, Duo nebs PRN  - continue Breo (home Advair not on formulary), montelukast   - further treatment per covid  protocol      VTE Risk Mitigation (From admission, onward)         Ordered     heparin (porcine) injection 5,000 Units  Every 8 hours      12/10/20 0853     IP VTE HIGH RISK PATIENT  Once      12/06/20 2211     Place sequential compression device  Until discontinued      12/06/20 2211                Discharge Planning   ALIX: 12/11/2020     Code Status: Full Code   Is the patient medically ready for discharge?: No    Reason for patient still in hospital (select all that apply): Treatment  Discharge Plan A: Home Health   Discharge Delays: None known at this time      Rodrigo Schmidt MD  Department of Hospital Medicine   [facility] - ICU 14 [facility]

## 2020-12-10 NOTE — PROGRESS NOTES
2135 Patients  BG= 123. Paged MD prior to scheduled Levemir 24 units. MD state to recheck BG an hour later and if >150 to give 17 units.     2235 Patient's BG rechecked= 191. Pt found eating turkey sandwich and gram crackers in room.  Given 17 units.  MD repaged and adjusted Levemir back to 24units. Pt refused to take 24 units.

## 2020-12-10 NOTE — PLAN OF CARE
Patient accepted by [facility].     SW spoke with patient's daughter Betsy (ph [phone]) and provided update regarding discharge plan.        12/10/20 1350   Post-Acute Status   Post-Acute Authorization Home Health   Home Health Status Awaiting Internal Medical Clearance   Discharge Plan   Discharge Plan A Home Health     Lashawn Martinez LMSW   - Case Management

## 2020-12-10 NOTE — ASSESSMENT & PLAN NOTE
On 12/10 patient noted to have a creatinine of 2.4 and BUN of 80. Possibly due to poor intake (pre renal) or ATN (remdesivir)    Plan:    - U/A, Fena ordered  -  Infusion x 10 H   - Renally dose all medications to current GFR  - Avoid nephrotoxic meds/agents such as NSAIDs, gadolinium and IV radiocontrast  - Strict intake and output and daily standing weights  - Trend daily labs

## 2020-12-10 NOTE — ASSESSMENT & PLAN NOTE
HOME MEDS = carvedilol 50 mg BID, irbesartan 300 mg qday, diltiazem 180 mg qday, chlorthalidone 12.5 mg qday, hydralazine 100 mg TID, clonidine patch 0.1 mg/24 h (taken weekly)     PLAN:  - hold chlorthalidone in context of COVID infection, continue other home medications  - on weekly clonidine patch  - ibersartan held since VIRGILIO

## 2020-12-11 LAB
ALBUMIN SERPL BCP-MCNC: 2.7 G/DL (ref 3.5–5.2)
ALP SERPL-CCNC: 69 U/L (ref 55–135)
ALT SERPL W/O P-5'-P-CCNC: 25 U/L (ref 10–44)
ANION GAP SERPL CALC-SCNC: 12 MMOL/L (ref 8–16)
ANION GAP SERPL CALC-SCNC: 15 MMOL/L (ref 8–16)
ANION GAP SERPL CALC-SCNC: 17 MMOL/L (ref 8–16)
AST SERPL-CCNC: 26 U/L (ref 10–40)
B-OH-BUTYR BLD STRIP-SCNC: 0.2 MMOL/L (ref 0–0.5)
BASOPHILS # BLD AUTO: 0.01 K/UL (ref 0–0.2)
BASOPHILS NFR BLD: 0.1 % (ref 0–1.9)
BILIRUB SERPL-MCNC: 0.2 MG/DL (ref 0.1–1)
BUN SERPL-MCNC: 82 MG/DL (ref 8–23)
BUN SERPL-MCNC: 84 MG/DL (ref 8–23)
BUN SERPL-MCNC: 87 MG/DL (ref 8–23)
CALCIUM SERPL-MCNC: 8.3 MG/DL (ref 8.7–10.5)
CALCIUM SERPL-MCNC: 8.5 MG/DL (ref 8.7–10.5)
CALCIUM SERPL-MCNC: 8.6 MG/DL (ref 8.7–10.5)
CHLORIDE SERPL-SCNC: 101 MMOL/L (ref 95–110)
CHLORIDE SERPL-SCNC: 102 MMOL/L (ref 95–110)
CHLORIDE SERPL-SCNC: 102 MMOL/L (ref 95–110)
CO2 SERPL-SCNC: 13 MMOL/L (ref 23–29)
CO2 SERPL-SCNC: 15 MMOL/L (ref 23–29)
CO2 SERPL-SCNC: 17 MMOL/L (ref 23–29)
CREAT SERPL-MCNC: 2.4 MG/DL (ref 0.5–1.4)
CREAT SERPL-MCNC: 2.4 MG/DL (ref 0.5–1.4)
CREAT SERPL-MCNC: 2.5 MG/DL (ref 0.5–1.4)
CREAT UR-MCNC: 43 MG/DL (ref 15–325)
DIFFERENTIAL METHOD: ABNORMAL
EOSINOPHIL # BLD AUTO: 0 K/UL (ref 0–0.5)
EOSINOPHIL NFR BLD: 0.4 % (ref 0–8)
ERYTHROCYTE [DISTWIDTH] IN BLOOD BY AUTOMATED COUNT: 13.2 % (ref 11.5–14.5)
EST. GFR  (AFRICAN AMERICAN): 20.2 ML/MIN/1.73 M^2
EST. GFR  (AFRICAN AMERICAN): 21.2 ML/MIN/1.73 M^2
EST. GFR  (AFRICAN AMERICAN): 21.2 ML/MIN/1.73 M^2
EST. GFR  (NON AFRICAN AMERICAN): 17.5 ML/MIN/1.73 M^2
EST. GFR  (NON AFRICAN AMERICAN): 18.4 ML/MIN/1.73 M^2
EST. GFR  (NON AFRICAN AMERICAN): 18.4 ML/MIN/1.73 M^2
GLUCOSE SERPL-MCNC: 339 MG/DL (ref 70–110)
GLUCOSE SERPL-MCNC: 470 MG/DL (ref 70–110)
GLUCOSE SERPL-MCNC: 504 MG/DL (ref 70–110)
HCT VFR BLD AUTO: 29.6 % (ref 37–48.5)
HGB BLD-MCNC: 9.2 G/DL (ref 12–16)
IMM GRANULOCYTES # BLD AUTO: 0.08 K/UL (ref 0–0.04)
IMM GRANULOCYTES NFR BLD AUTO: 1 % (ref 0–0.5)
LYMPHOCYTES # BLD AUTO: 1 K/UL (ref 1–4.8)
LYMPHOCYTES NFR BLD: 12.6 % (ref 18–48)
MAGNESIUM SERPL-MCNC: 2 MG/DL (ref 1.6–2.6)
MCH RBC QN AUTO: 27.5 PG (ref 27–31)
MCHC RBC AUTO-ENTMCNC: 31.1 G/DL (ref 32–36)
MCV RBC AUTO: 89 FL (ref 82–98)
MONOCYTES # BLD AUTO: 0.5 K/UL (ref 0.3–1)
MONOCYTES NFR BLD: 6 % (ref 4–15)
NEUTROPHILS # BLD AUTO: 6.4 K/UL (ref 1.8–7.7)
NEUTROPHILS NFR BLD: 79.9 % (ref 38–73)
NRBC BLD-RTO: 0 /100 WBC
PHOSPHATE SERPL-MCNC: 4.5 MG/DL (ref 2.7–4.5)
PLATELET # BLD AUTO: 242 K/UL (ref 150–350)
PMV BLD AUTO: 11.5 FL (ref 9.2–12.9)
POCT GLUCOSE: 223 MG/DL (ref 70–110)
POCT GLUCOSE: 230 MG/DL (ref 70–110)
POCT GLUCOSE: 261 MG/DL (ref 70–110)
POCT GLUCOSE: 313 MG/DL (ref 70–110)
POCT GLUCOSE: 382 MG/DL (ref 70–110)
POCT GLUCOSE: 423 MG/DL (ref 70–110)
POCT GLUCOSE: 456 MG/DL (ref 70–110)
POCT GLUCOSE: 463 MG/DL (ref 70–110)
POCT GLUCOSE: 496 MG/DL (ref 70–110)
POCT GLUCOSE: >500 MG/DL (ref 70–110)
POTASSIUM SERPL-SCNC: 4.3 MMOL/L (ref 3.5–5.1)
POTASSIUM SERPL-SCNC: 4.4 MMOL/L (ref 3.5–5.1)
POTASSIUM SERPL-SCNC: 4.4 MMOL/L (ref 3.5–5.1)
PROT SERPL-MCNC: 6.3 G/DL (ref 6–8.4)
RBC # BLD AUTO: 3.34 M/UL (ref 4–5.4)
SODIUM SERPL-SCNC: 130 MMOL/L (ref 136–145)
SODIUM SERPL-SCNC: 132 MMOL/L (ref 136–145)
SODIUM SERPL-SCNC: 132 MMOL/L (ref 136–145)
WBC # BLD AUTO: 8.02 K/UL (ref 3.9–12.7)

## 2020-12-11 PROCEDURE — 99233 SBSQ HOSP IP/OBS HIGH 50: CPT | Mod: HCNC,GC,, | Performed by: HOSPITALIST

## 2020-12-11 PROCEDURE — 27000221 HC OXYGEN, UP TO 24 HOURS: Mod: HCNC

## 2020-12-11 PROCEDURE — 63600175 PHARM REV CODE 636 W HCPCS: Mod: HCNC | Performed by: STUDENT IN AN ORGANIZED HEALTH CARE EDUCATION/TRAINING PROGRAM

## 2020-12-11 PROCEDURE — 25000003 PHARM REV CODE 250: Mod: HCNC | Performed by: STUDENT IN AN ORGANIZED HEALTH CARE EDUCATION/TRAINING PROGRAM

## 2020-12-11 PROCEDURE — 84100 ASSAY OF PHOSPHORUS: CPT | Mod: HCNC

## 2020-12-11 PROCEDURE — 80053 COMPREHEN METABOLIC PANEL: CPT | Mod: HCNC

## 2020-12-11 PROCEDURE — 80048 BASIC METABOLIC PNL TOTAL CA: CPT | Mod: HCNC

## 2020-12-11 PROCEDURE — 99233 PR SUBSEQUENT HOSPITAL CARE,LEVL III: ICD-10-PCS | Mod: HCNC,GC,, | Performed by: HOSPITALIST

## 2020-12-11 PROCEDURE — 94761 N-INVAS EAR/PLS OXIMETRY MLT: CPT | Mod: HCNC

## 2020-12-11 PROCEDURE — 97116 GAIT TRAINING THERAPY: CPT | Mod: HCNC

## 2020-12-11 PROCEDURE — 97530 THERAPEUTIC ACTIVITIES: CPT | Mod: HCNC

## 2020-12-11 PROCEDURE — 11000001 HC ACUTE MED/SURG PRIVATE ROOM: Mod: HCNC

## 2020-12-11 PROCEDURE — 36415 COLL VENOUS BLD VENIPUNCTURE: CPT | Mod: HCNC

## 2020-12-11 PROCEDURE — 94799 UNLISTED PULMONARY SVC/PX: CPT | Mod: HCNC

## 2020-12-11 PROCEDURE — 82010 KETONE BODYS QUAN: CPT | Mod: HCNC

## 2020-12-11 PROCEDURE — 99900035 HC TECH TIME PER 15 MIN (STAT): Mod: HCNC

## 2020-12-11 PROCEDURE — 94664 DEMO&/EVAL PT USE INHALER: CPT | Mod: HCNC

## 2020-12-11 PROCEDURE — 94640 AIRWAY INHALATION TREATMENT: CPT | Mod: HCNC

## 2020-12-11 PROCEDURE — 85025 COMPLETE CBC W/AUTO DIFF WBC: CPT | Mod: HCNC

## 2020-12-11 PROCEDURE — C9399 UNCLASSIFIED DRUGS OR BIOLOG: HCPCS | Mod: HCNC | Performed by: STUDENT IN AN ORGANIZED HEALTH CARE EDUCATION/TRAINING PROGRAM

## 2020-12-11 PROCEDURE — 83735 ASSAY OF MAGNESIUM: CPT | Mod: HCNC

## 2020-12-11 RX ORDER — SODIUM CHLORIDE 0.9 % (FLUSH) 0.9 %
10 SYRINGE (ML) INJECTION
Status: DISCONTINUED | OUTPATIENT
Start: 2020-12-11 | End: 2020-12-12 | Stop reason: HOSPADM

## 2020-12-11 RX ORDER — DEXTROSE MONOHYDRATE 100 MG/ML
INJECTION, SOLUTION INTRAVENOUS
Status: DISCONTINUED | OUTPATIENT
Start: 2020-12-11 | End: 2020-12-12 | Stop reason: HOSPADM

## 2020-12-11 RX ORDER — INSULIN ASPART 100 [IU]/ML
6 INJECTION, SOLUTION INTRAVENOUS; SUBCUTANEOUS ONCE
Status: COMPLETED | OUTPATIENT
Start: 2020-12-11 | End: 2020-12-11

## 2020-12-11 RX ORDER — INSULIN ASPART 100 [IU]/ML
15 INJECTION, SOLUTION INTRAVENOUS; SUBCUTANEOUS
Status: DISCONTINUED | OUTPATIENT
Start: 2020-12-11 | End: 2020-12-11

## 2020-12-11 RX ORDER — SODIUM BICARBONATE 650 MG/1
650 TABLET ORAL 2 TIMES DAILY
Status: DISCONTINUED | OUTPATIENT
Start: 2020-12-11 | End: 2020-12-12 | Stop reason: HOSPADM

## 2020-12-11 RX ORDER — SODIUM CHLORIDE, SODIUM LACTATE, POTASSIUM CHLORIDE, CALCIUM CHLORIDE 600; 310; 30; 20 MG/100ML; MG/100ML; MG/100ML; MG/100ML
INJECTION, SOLUTION INTRAVENOUS CONTINUOUS
Status: DISCONTINUED | OUTPATIENT
Start: 2020-12-11 | End: 2020-12-11

## 2020-12-11 RX ORDER — INSULIN ASPART 100 [IU]/ML
15 INJECTION, SOLUTION INTRAVENOUS; SUBCUTANEOUS
Status: DISCONTINUED | OUTPATIENT
Start: 2020-12-12 | End: 2020-12-12

## 2020-12-11 RX ADMIN — ASPIRIN 81 MG: 81 TABLET, COATED ORAL at 09:12

## 2020-12-11 RX ADMIN — SODIUM CHLORIDE 1.2 UNITS/HR: 9 INJECTION, SOLUTION INTRAVENOUS at 02:12

## 2020-12-11 RX ADMIN — CARVEDILOL 50 MG: 25 TABLET, FILM COATED ORAL at 08:12

## 2020-12-11 RX ADMIN — CETIRIZINE HYDROCHLORIDE 5 MG: 5 TABLET ORAL at 08:12

## 2020-12-11 RX ADMIN — SODIUM CHLORIDE 1 UNITS/HR: 9 INJECTION, SOLUTION INTRAVENOUS at 12:12

## 2020-12-11 RX ADMIN — CARVEDILOL 50 MG: 25 TABLET, FILM COATED ORAL at 09:12

## 2020-12-11 RX ADMIN — MUPIROCIN: 20 OINTMENT TOPICAL at 08:12

## 2020-12-11 RX ADMIN — INSULIN DETEMIR 30 UNITS: 100 INJECTION, SOLUTION SUBCUTANEOUS at 09:12

## 2020-12-11 RX ADMIN — ALBUTEROL SULFATE 2 PUFF: 90 AEROSOL, METERED RESPIRATORY (INHALATION) at 08:12

## 2020-12-11 RX ADMIN — HYDRALAZINE HYDROCHLORIDE 100 MG: 50 TABLET, FILM COATED ORAL at 08:12

## 2020-12-11 RX ADMIN — Medication 2000 UNITS: at 09:12

## 2020-12-11 RX ADMIN — SODIUM CHLORIDE, SODIUM LACTATE, POTASSIUM CHLORIDE, AND CALCIUM CHLORIDE: .6; .31; .03; .02 INJECTION, SOLUTION INTRAVENOUS at 06:12

## 2020-12-11 RX ADMIN — REMDESIVIR 100 MG: 100 INJECTION, POWDER, LYOPHILIZED, FOR SOLUTION INTRAVENOUS at 05:12

## 2020-12-11 RX ADMIN — Medication 500 MG: at 09:12

## 2020-12-11 RX ADMIN — HEPARIN SODIUM 5000 UNITS: 5000 INJECTION INTRAVENOUS; SUBCUTANEOUS at 06:12

## 2020-12-11 RX ADMIN — HEPARIN SODIUM 5000 UNITS: 5000 INJECTION INTRAVENOUS; SUBCUTANEOUS at 03:12

## 2020-12-11 RX ADMIN — Medication 500 MG: at 08:12

## 2020-12-11 RX ADMIN — DILTIAZEM HYDROCHLORIDE 180 MG: 180 CAPSULE, COATED, EXTENDED RELEASE ORAL at 10:12

## 2020-12-11 RX ADMIN — INSULIN ASPART 15 UNITS: 100 INJECTION, SOLUTION INTRAVENOUS; SUBCUTANEOUS at 09:12

## 2020-12-11 RX ADMIN — HYDRALAZINE HYDROCHLORIDE 100 MG: 50 TABLET, FILM COATED ORAL at 03:12

## 2020-12-11 RX ADMIN — HEPARIN SODIUM 5000 UNITS: 5000 INJECTION INTRAVENOUS; SUBCUTANEOUS at 08:12

## 2020-12-11 RX ADMIN — MUPIROCIN: 20 OINTMENT TOPICAL at 09:12

## 2020-12-11 RX ADMIN — FLUTICASONE FUROATE AND VILANTEROL TRIFENATATE 1 PUFF: 200; 25 POWDER RESPIRATORY (INHALATION) at 08:12

## 2020-12-11 RX ADMIN — HYDRALAZINE HYDROCHLORIDE 100 MG: 50 TABLET, FILM COATED ORAL at 09:12

## 2020-12-11 RX ADMIN — SODIUM BICARBONATE 650 MG TABLET 650 MG: at 08:12

## 2020-12-11 RX ADMIN — PRAVASTATIN SODIUM 40 MG: 40 TABLET ORAL at 09:12

## 2020-12-11 RX ADMIN — SODIUM CHLORIDE 1.4 UNITS/HR: 9 INJECTION, SOLUTION INTRAVENOUS at 05:12

## 2020-12-11 RX ADMIN — LEVOFLOXACIN 500 MG: 500 TABLET, FILM COATED ORAL at 09:12

## 2020-12-11 RX ADMIN — SODIUM BICARBONATE 650 MG TABLET 650 MG: at 09:12

## 2020-12-11 RX ADMIN — ALBUTEROL SULFATE 2 PUFF: 90 AEROSOL, METERED RESPIRATORY (INHALATION) at 01:12

## 2020-12-11 RX ADMIN — MELATONIN TAB 3 MG 6 MG: 3 TAB at 08:12

## 2020-12-11 RX ADMIN — MONTELUKAST 10 MG: 10 TABLET, FILM COATED ORAL at 08:12

## 2020-12-11 RX ADMIN — SODIUM CHLORIDE 0.7 UNITS/HR: 9 INJECTION, SOLUTION INTRAVENOUS at 06:12

## 2020-12-11 RX ADMIN — ALBUTEROL SULFATE 2 PUFF: 90 AEROSOL, METERED RESPIRATORY (INHALATION) at 12:12

## 2020-12-11 RX ADMIN — INSULIN ASPART 6 UNITS: 100 INJECTION, SOLUTION INTRAVENOUS; SUBCUTANEOUS at 07:12

## 2020-12-11 RX ADMIN — BENZONATATE 200 MG: 100 CAPSULE ORAL at 08:12

## 2020-12-11 RX ADMIN — INSULIN DETEMIR 18 UNITS: 100 INJECTION, SOLUTION SUBCUTANEOUS at 08:12

## 2020-12-11 RX ADMIN — SODIUM CHLORIDE 2 UNITS/HR: 9 INJECTION, SOLUTION INTRAVENOUS at 03:12

## 2020-12-11 NOTE — PROGRESS NOTES
[facility] - ICU 14 [facility]  Progress Note    Patient Name: Myesha Quinones  MRN: [id number]  Patient Class: IP- Inpatient   Admission Date: 12/6/2020  Length of Stay: 5 days  Attending Physician: Onel Acosta MD  Primary Care Provider: Emelina Gaston MD        Subjective:     Principal Problem:Pneumonia due to COVID-19 virus        HPI:  This is an 80 year old female with pmhx of Asthma, CAD, ischemic cardiomyopathy, CHF, CKD3, insulin dependent T2DM,  breast cancer, HTN, HLD who was tested positive for COVID after her daughter and  had tested positive for it last week. She was initially sent home with normal vitals and examination. She was diagnosed on the 1st of this month. Today she presents with fevers, increased fatigue, loose stools, and dyspnea. She has a chronic cough with occasional green tinged sputum but is now having clear sputum. She called EMS and was found to be satting in the low 80's and responded to oxygen supplementation. Otherwise she does not have any other complaints. Denies any chest pain, headache, nausea, vomiting.    In the ED she was placed on 8L NC with saturations ~99%, hypertensive 161/67, afebrile. Laboratory evaluation revealed chronic anemia, mild hyponatremia. Imaging with x-ray of the chest revealed stable findings.    Overview/Hospital Course:  Patient was admitted for acute hypoxic resp failure 2/2 to COVID-19 PNA. She completed azithromycin that was prescribed outpatient. Wjhile here she received dexamethasone and remdesivir. Patient is on room air and appears to be doing well. Renal function remained stable. Failed 6MWT (desstated to 88% with ambulation but recovers quickly on RA). Patient going back and forth between RA and 1-2 L NC,  Approved for home O2. New VIRGILIO possibly due to poor intake, will give fluids and analyze urine. Blood sugar levels increasing, beta hydroxy but normal, will start insulin drip.     Interval History: no  acute events overnight     Review of Systems   Constitutional: Negative for chills, fatigue and fever.   HENT: Negative for congestion, rhinorrhea and sore throat.    Eyes: Negative for pain and visual disturbance.   Respiratory: Positive for cough. Negative for apnea, shortness of breath and wheezing.    Cardiovascular: Negative for chest pain, palpitations and leg swelling.   Gastrointestinal: Negative for abdominal pain, diarrhea, nausea and vomiting.   Genitourinary: Negative for difficulty urinating, flank pain and hematuria.   Musculoskeletal: Negative for myalgias.   Skin: Negative for rash.   Neurological: Negative for light-headedness and headaches.   Psychiatric/Behavioral: Negative for agitation, behavioral problems, confusion and decreased concentration. The patient is nervous/anxious.      Objective:     Vital Signs (Most Recent):  Temp: 98.3 °F (36.8 °C) (12/11/20 1400)  Pulse: 70 (12/11/20 1223)  Resp: (!) 25 (12/11/20 1223)  BP: (!) 135/93 (12/11/20 0949)  SpO2: 98 % (12/11/20 1223) Vital Signs (24h Range):  Temp:  [97.6 °F (36.4 °C)-98.3 °F (36.8 °C)] 98.3 °F (36.8 °C)  Pulse:  [70-80] 70  Resp:  [20-27] 25  SpO2:  [90 %-98 %] 98 %  BP: (135-177)/(66-93) 135/93     Weight: 67.6 kg (149 lb 0.5 oz)  Body mass index is 26.4 kg/m².    Intake/Output Summary (Last 24 hours) at 12/11/2020 1442  Last data filed at 12/11/2020 1046  Gross per 24 hour   Intake --   Output 200 ml   Net -200 ml      Physical Exam  Vitals signs and nursing note reviewed.   Constitutional:       Appearance: Normal appearance.   HENT:      Head: Normocephalic and atraumatic.      Nose: Nose normal. No congestion or rhinorrhea.      Mouth/Throat:      Mouth: Mucous membranes are moist.      Pharynx: Oropharynx is clear.   Eyes:      Extraocular Movements: Extraocular movements intact.      Conjunctiva/sclera: Conjunctivae normal.      Pupils: Pupils are equal, round, and reactive to light.   Neck:      Musculoskeletal: Normal range  of motion and neck supple.   Cardiovascular:      Rate and Rhythm: Normal rate and regular rhythm.      Heart sounds: No murmur.   Pulmonary:      Effort: No respiratory distress.      Breath sounds: No stridor. No wheezing or rhonchi.   Abdominal:      General: Abdomen is flat. There is no distension.      Palpations: Abdomen is soft.      Tenderness: There is no abdominal tenderness.   Musculoskeletal: Normal range of motion.         General: No swelling, deformity or signs of injury.   Skin:     General: Skin is warm and dry.      Capillary Refill: Capillary refill takes less than 2 seconds.   Neurological:      General: No focal deficit present.      Mental Status: She is alert and oriented to person, place, and time.         Significant Labs:   CBC:   Recent Labs   Lab 12/10/20  0259 12/11/20  0313   WBC 5.80 8.02   HGB 9.0* 9.2*   HCT 30.0* 29.6*    242     CMP:   Recent Labs   Lab 12/10/20  0259 12/11/20  0313   * 132*   K 4.4 4.3    102   CO2 16* 13*   * 470*   BUN 80* 84*   CREATININE 2.4* 2.4*   CALCIUM 8.5* 8.3*   PROT 6.2 6.3   ALBUMIN 2.5* 2.7*   BILITOT 0.1 0.2   ALKPHOS 64 69   AST 27 26   ALT 21 25   ANIONGAP 13 17*   EGFRNONAA 18.4* 18.4*     All pertinent labs within the past 24 hours have been reviewed.    Significant Imaging: I have reviewed and interpreted all pertinent imaging results/findings within the past 24 hours.      Assessment/Plan:      * Pneumonia due to COVID-19 virus  COVID-19 Virus Infection  Viral Pneumonia due to COVID-19  - COVID-19 testing: Collection Date: 12/1/2020 Collection Time:   9:22 AM   - Isolation: Airborne/Droplet. Surgical mask on patient. Notify Infection Control  - Diagnostics: Trend Q48hrs if stable, more frequently if patient decompensating       - CBC       - CMP       - Mg        - D-dimer       - Ferritin       - LDH    Lymphopenia, hyponatremia, hyperferritinemia, elevated troponin, elevated d-dimer, age, and medical comorbidities  are significant predictors of poor clinical outcome    - Management: Per [facility] COVID Treatment Protocol    - Telemetry & Continuous Pulse Oximetry    - Nutrition:        - Multivitamin PO daily - pt allergic to iodine       - Boost supplement       - Vitamin D 2000IU daily - home dose        - Ascorbic acid 500mg PO bid    - Supportive Care:       - acetaminophen 650mg PO Q6hr PRN fever/headache       - loperamide PRN viral diarrhea       - IVF if indicated, restrictive strategy preferred, no maintenance IV if able       - VTE PPx: enoxaparin 30 given low CrCl (13mL/min)    - Antibiotics:       - azithromycin 250mg po daily x 2 days - patient was prescribed Zpack on the 3rd (d2)       - can add ceftriaxone of there is further concern for concomitant bacterial PNA     - Remdesivir (d3)     - Dexamethasone 6mg daily x 10 days (d4)      Acute Hypoxemic Respiratory Failure    - Order RT consult via Respiratory Communication for COVID Protocols    - Incentive Spirometer Q4h, Flutter Valve Q4h    - Continuous Pulse Oximetry, goal SpO2 92-96%    - Supplemental O2 via LFNC, VentiMask, or HFNC (see Respiratory Support Oxygen Therapies)    - If wheezing, albuterol INH Q6h scheduled & PRN    - Proning Protocol if patient is a candidate (see  Proning Protocol)   - GCS >13, able to self-prone    - If deterioration, may warrant trial of NIPPV in neg pressure room or immediate ICU consult    On 12/9 patient able to tolerate RA satting >95. Failed 6MWT (desstated to 88% with ambulation but recovers quickly on RA). Patient going back and forth between RA and 1-2 L NC,  Approved for home O2. resp cultures positive for pseudomonas, levofloxacin started 750 (then changed to 500 since VIRGILIO)        CKD (chronic kidney disease) stage 4, GFR 15-29 ml/min  Baseline Cr noted to be ~1.6, most recently in the last 2 months Cr at 1.8    - Renally dose all medications to current GFR  - Avoid nephrotoxic meds/agents such as NSAIDs, gadolinium  and IV radiocontrast  - Strict intake and output and daily standing weights  - Trend daily labs        VIRGILIO (acute kidney injury)  On 12/10 patient noted to have a creatinine of 2.4 and BUN of 80. Possibly due to poor intake (pre renal) or ATN (remdesivir)    Plan:    - creatinine today same, 2.4 BUN 84   - U/A, Fena ordered - still pending   -  Infusion x 10 H again today since insulin drip   - Renally dose all medications to current GFR  - Avoid nephrotoxic meds/agents such as NSAIDs, gadolinium and IV radiocontrast  - Strict intake and output and daily standing weights  - Trend daily labs      Uncontrolled type 2 diabetes mellitus with hyperglycemia  HOME MED = linagliptin, metformin, Lantus 34U QHS  Hemoglobin A1C   Date Value Ref Range Status   11/16/2020 6.9 (H) 4.0 - 5.6 % Final     Comment:     ADA Screening Guidelines:  5.7-6.4%  Consistent with prediabetes  >or=6.5%  Consistent with diabetes  High levels of fetal hemoglobin interfere with the HbA1C  assay. Heterozygous hemoglobin variants (HbS, HgC, etc)do  not significantly interfere with this assay.   However, presence of multiple variants may affect accuracy.            PLAN:  - Patient blood glucose have been worsening since overnight. Beta hydroxy WNL, started insulin drip 2 U.   - BMP Q4  - /h x 10 h   - Clear diet      Hypertension associated with diabetes  HOME MEDS = carvedilol 50 mg BID, irbesartan 300 mg qday, diltiazem 180 mg qday, chlorthalidone 12.5 mg qday, hydralazine 100 mg TID, clonidine patch 0.1 mg/24 h (taken weekly)     PLAN:  - hold chlorthalidone in context of COVID infection, continue other home medications  - on weekly clonidine patch  - ibersartan held since VIRGILIO       Coronary artery disease involving native coronary artery without angina pectoris - Non-obstructive 50% LAD  - home ASA  - continue home pravastatin      Asthma, chronic    - continue scheduled Albuterol q6h, Duo nebs PRN  - continue Breo (home Advair not  on formulary)  - further treatment per covid protocol      VTE Risk Mitigation (From admission, onward)         Ordered     heparin (porcine) injection 5,000 Units  Every 8 hours      12/10/20 0853     IP VTE HIGH RISK PATIENT  Once      12/06/20 2211     Place sequential compression device  Until discontinued      12/06/20 2211                Discharge Planning   ALIX: 12/12/2020     Code Status: Full Code   Is the patient medically ready for discharge?: No    Reason for patient still in hospital (select all that apply): Treatment  Discharge Plan A: Home with family, Home Health   Discharge Delays: None known at this time        Rodrigo Schmidt MD  Department of Hospital Medicine   [facility] - ICU 14 [facility]

## 2020-12-11 NOTE — PT/OT/SLP PROGRESS
Physical Therapy Treatment    Patient Name:  Myesha Quinones   MRN:  [id number]  Admitting Diagnosis:  Pneumonia due to COVID-19 virus   Recent Surgery: * No surgery found *    Admit Date: 12/6/2020  Length of Stay: 5 days    Recommendations:     Discharge Recommendations:  home health PT   Discharge Equipment Recommendations: none   Barriers to discharge: None    Assessment:     Myesha Quinones is a 80 y.o. female admitted with a medical diagnosis of Pneumonia due to COVID-19 virus.  She presents with the following impairments/functional limitations:  weakness, impaired endurance, impaired self care skills, impaired functional mobilty, gait instability, impaired cardiopulmonary response to activity. Patient tolerated session well today. Patient with improved endurance and cardiopulmonary response to continued activity on this date as seen by increase distance ambulated, decreased level of assist required, and with SPO2 WNL. Pt will continue to benefit from skilled PT services during this hospital admit to continue to improve transfer ability and efficiency as well as continue to progress pt's ambulation distance and cardiopulmonary endurance to maximize pt's functional independence and return to PLOF. After discharge pt will benefit from HHPT to further progress functional mobility and cardiopulmonary endurance as well as for home safety and energy conservation techniques.    Rehab Prognosis: Good; patient would benefit from acute skilled PT services to address these deficits and reach maximum level of function.    Recent Surgery: * No surgery found *      Plan:     During this hospitalization, patient to be seen 3 x/week to address the identified rehab impairments via therapeutic activities, gait training, neuromuscular re-education, therapeutic exercises and progress toward the following goals:    · Plan of Care Expires:  01/06/21    Subjective     RN notified prior to session. No family/friends present  upon PT entrance into room.    Chief Complaint: Patient reporting she was hoping to go home today but found out she couldn't due to her high glucose levels  Patient/Family Comments/goals: go home  Pain/Comfort:  · Pain Rating 1: 0/10  · Pain Rating Post-Intervention 1: 0/10      Objective:     Additional staff present: none    Patient found seated EOB with: telemetry, pulse ox (continuous), oxygen   Mental Status: Patient is oriented to AxOx4 and follows multistep commands. Patient is Alert and Cooperative during session.    General Precautions: Standard, Cardiac airborne, droplet, fall   Orthopedic Precautions:N/A   Braces: N/A   Body mass index is 26.4 kg/m².  Oxygen Device: Nasal Cannula 2L    Outcome Measures:  AM-PAC 6 CLICK MOBILITY  Turning over in bed (including adjusting bedclothes, sheets and blankets)?: 4  Sitting down on and standing up from a chair with arms (e.g., wheelchair, bedside commode, etc.): 4  Moving from lying on back to sitting on the side of the bed?: 4  Moving to and from a bed to a chair (including a wheelchair)?: 3  Need to walk in hospital room?: 3  Climbing 3-5 steps with a railing?: 3  Basic Mobility Total Score: 21     Functional Mobility:    Bed Mobility:   · Pt found seated EOB/returned to bedside chair    Sitting Balance at Edge of Bed:   Assistance Level Required: Supervision    Transfers:   · Sit <> Stand Transfer: stand by assistance with no assistive device   · Stand <> Sit Transfer: stand by assistance with no assistive device   · [id number] from EOB and [id number] from bedside chair    Standing Balance:   Assistance Level Required: Stand-by Assistance   Patient used: no assistive device    Time: 3 minutes   Postural deviations noted: no deviations noted   Comments: Patient able to perform tasks at sink with stand-by assistance with focus on activity tolerance and endurance to perform daily tasks in the home. No LOB throughout and minimal SOB noted. SPO2 at end of task was  97%.      Gait:  · Patient ambulated: 12 ft x 2 (to/from sink); 210 ft (2 standing rest breaks during)   · Patient required: standy by assistance  · Patient used:  no assistive device   · Gait Pattern observed: reciprocal gait  · Gait Deviation(s): decreased step length, wide base of support, steady gait, shuffle gait and decreased sandra  · Impairments due to: decreased endurance  · all lines remained intact throughout ambulation trial  · Mask donned for out of room ambulation  · Comments: Pt was able to perform vertical/horizontal head turns, 180 degree turns while ambulating, and avoid hallway obstacles without LOB. Patient with decreased step length and minimal foot clearance for energy conservation. 2 standing rest breaks with focus on PLB.    Education:   Time provided for education, counseling and discussion of health disposition in regards to patient's current status   All questions answered within PT scope of practice and to patient's satisfaction   PT role in POC to address current functional deficits   Pt educated on proper body mechanics, safety techniques, and energy conservation with PT facilitation and cueing throughout session   Call nursing/pct to transfer to chair/use bathroom. Pt stated understanding.   Whiteboard updated with pt's current mobility status documented above   Safe to perform step transfer to/from chair/bedside commode SBA and no AD w/ nursing/PCT present   Importance of OOB tolerance 8-10 hrs/day to improve lung ventilation and expansion as well as strengthen postural musculature    Patient left up in chair with all lines intact, call button in reach and RN notified.    GOALS:   Multidisciplinary Problems     Physical Therapy Goals        Problem: Physical Therapy Goal    Goal Priority Disciplines Outcome Goal Variances Interventions   Physical Therapy Goal     PT, PT/OT Ongoing, Progressing     Description: Goals to be met by: 12/17/20     Patient will increase functional  independence with mobility by performin. Supine to sit with Scott  2. Sit to supine with Scott  3. Sit to stand transfer with Modified Scott  4. Gait  x 150 feet with Modified Scott using LRAD, if needed.                    Time Tracking:     PT Received On: 20  PT Start Time: 938     PT Stop Time:   PT Total Time (min): 32 min       Billable Minutes:   · Gait Training 10 and Therapeutic Activity 15    Treatment Type: Treatment  PT/PTA: PT       Carla Gallegos PT, DPT  2020  Pager: [phone]

## 2020-12-11 NOTE — PLAN OF CARE
Problem: Fall Injury Risk  Goal: Absence of Fall and Fall-Related Injury  Outcome: Ongoing, Progressing     Problem: Adult Inpatient Plan of Care  Goal: Plan of Care Review  Outcome: Ongoing, Progressing  Goal: Patient-Specific Goal (Individualization)  Outcome: Ongoing, Progressing  Goal: Absence of Hospital-Acquired Illness or Injury  Outcome: Ongoing, Progressing  Goal: Optimal Comfort and Wellbeing  Outcome: Ongoing, Progressing  Goal: Readiness for Transition of Care  Outcome: Ongoing, Progressing  Goal: Rounds/Family Conference  Outcome: Ongoing, Progressing     Problem: Diabetes Comorbidity  Goal: Blood Glucose Level Within Desired Range  Outcome: Ongoing, Progressing     Problem: Skin Injury Risk Increased  Goal: Skin Health and Integrity  Outcome: Ongoing, Progressing     Problem: Electrolyte Imbalance (Acute Kidney Injury/Impairment)  Goal: Serum Electrolyte Balance  Outcome: Ongoing, Progressing     Problem: Fluid Imbalance (Acute Kidney Injury/Impairment)  Goal: Optimal Fluid Balance  Outcome: Ongoing, Progressing     Problem: Hematologic Alteration (Acute Kidney Injury/Impairment)  Goal: Hemoglobin, Hematocrit and Platelets Within Normal Range  Outcome: Ongoing, Progressing     Problem: Oral Intake Inadequate (Acute Kidney Injury/Impairment)  Goal: Optimal Nutrition Intake  Outcome: Ongoing, Progressing     Problem: Renal Function Impairment (Acute Kidney Injury/Impairment)  Goal: Effective Renal Function  Outcome: Ongoing, Progressing

## 2020-12-11 NOTE — ASSESSMENT & PLAN NOTE
HOME MED = linagliptin, metformin, Lantus 34U QHS  Hemoglobin A1C   Date Value Ref Range Status   11/16/2020 6.9 (H) 4.0 - 5.6 % Final     Comment:     ADA Screening Guidelines:  5.7-6.4%  Consistent with prediabetes  >or=6.5%  Consistent with diabetes  High levels of fetal hemoglobin interfere with the HbA1C  assay. Heterozygous hemoglobin variants (HbS, HgC, etc)do  not significantly interfere with this assay.   However, presence of multiple variants may affect accuracy.            PLAN:  - Patient blood glucose have been worsening since overnight. Beta hydroxy WNL, started insulin drip 2 U.   - BMP Q4  - /h x 10 h   - Clear diet

## 2020-12-11 NOTE — ASSESSMENT & PLAN NOTE
On 12/10 patient noted to have a creatinine of 2.4 and BUN of 80. Possibly due to poor intake (pre renal) or ATN (remdesivir)    Plan:    - creatinine today same, 2.4 BUN 84   - U/A, Fena ordered - still pending   -  Infusion x 10 H again today since insulin drip   - Renally dose all medications to current GFR  - Avoid nephrotoxic meds/agents such as NSAIDs, gadolinium and IV radiocontrast  - Strict intake and output and daily standing weights  - Trend daily labs

## 2020-12-11 NOTE — SUBJECTIVE & OBJECTIVE
Interval History: no acute events overnight     Review of Systems   Constitutional: Negative for chills, fatigue and fever.   HENT: Negative for congestion, rhinorrhea and sore throat.    Eyes: Negative for pain and visual disturbance.   Respiratory: Positive for cough. Negative for apnea, shortness of breath and wheezing.    Cardiovascular: Negative for chest pain, palpitations and leg swelling.   Gastrointestinal: Negative for abdominal pain, diarrhea, nausea and vomiting.   Genitourinary: Negative for difficulty urinating, flank pain and hematuria.   Musculoskeletal: Negative for myalgias.   Skin: Negative for rash.   Neurological: Negative for light-headedness and headaches.   Psychiatric/Behavioral: Negative for agitation, behavioral problems, confusion and decreased concentration. The patient is nervous/anxious.      Objective:     Vital Signs (Most Recent):  Temp: 98.3 °F (36.8 °C) (12/11/20 1400)  Pulse: 70 (12/11/20 1223)  Resp: (!) 25 (12/11/20 1223)  BP: (!) 135/93 (12/11/20 0949)  SpO2: 98 % (12/11/20 1223) Vital Signs (24h Range):  Temp:  [97.6 °F (36.4 °C)-98.3 °F (36.8 °C)] 98.3 °F (36.8 °C)  Pulse:  [70-80] 70  Resp:  [20-27] 25  SpO2:  [90 %-98 %] 98 %  BP: (135-177)/(66-93) 135/93     Weight: 67.6 kg (149 lb 0.5 oz)  Body mass index is 26.4 kg/m².    Intake/Output Summary (Last 24 hours) at 12/11/2020 1442  Last data filed at 12/11/2020 1046  Gross per 24 hour   Intake --   Output 200 ml   Net -200 ml      Physical Exam  Vitals signs and nursing note reviewed.   Constitutional:       Appearance: Normal appearance.   HENT:      Head: Normocephalic and atraumatic.      Nose: Nose normal. No congestion or rhinorrhea.      Mouth/Throat:      Mouth: Mucous membranes are moist.      Pharynx: Oropharynx is clear.   Eyes:      Extraocular Movements: Extraocular movements intact.      Conjunctiva/sclera: Conjunctivae normal.      Pupils: Pupils are equal, round, and reactive to light.   Neck:       Musculoskeletal: Normal range of motion and neck supple.   Cardiovascular:      Rate and Rhythm: Normal rate and regular rhythm.      Heart sounds: No murmur.   Pulmonary:      Effort: No respiratory distress.      Breath sounds: No stridor. No wheezing or rhonchi.   Abdominal:      General: Abdomen is flat. There is no distension.      Palpations: Abdomen is soft.      Tenderness: There is no abdominal tenderness.   Musculoskeletal: Normal range of motion.         General: No swelling, deformity or signs of injury.   Skin:     General: Skin is warm and dry.      Capillary Refill: Capillary refill takes less than 2 seconds.   Neurological:      General: No focal deficit present.      Mental Status: She is alert and oriented to person, place, and time.         Significant Labs:   CBC:   Recent Labs   Lab 12/10/20  0259 12/11/20  0313   WBC 5.80 8.02   HGB 9.0* 9.2*   HCT 30.0* 29.6*    242     CMP:   Recent Labs   Lab 12/10/20  0259 12/11/20  0313   * 132*   K 4.4 4.3    102   CO2 16* 13*   * 470*   BUN 80* 84*   CREATININE 2.4* 2.4*   CALCIUM 8.5* 8.3*   PROT 6.2 6.3   ALBUMIN 2.5* 2.7*   BILITOT 0.1 0.2   ALKPHOS 64 69   AST 27 26   ALT 21 25   ANIONGAP 13 17*   EGFRNONAA 18.4* 18.4*     All pertinent labs within the past 24 hours have been reviewed.    Significant Imaging: I have reviewed and interpreted all pertinent imaging results/findings within the past 24 hours.

## 2020-12-11 NOTE — PLAN OF CARE
JASON spoke with Amy Hennessy [phone] with ([facility]) home health regarding discharge over the weekend.  Patient's contact information provided and demographics updated with daughter and son phone numbers.  Home health will see the patient on Monday.    CM spoke with patient's son Rigo [phone] regarding discharge over the weekend, home health, follow up PCP appt., and home oxygen.  Pt's son reports the home oxygen is in the patient's room [address] for discharge home.  All questions answered.    Pt's  is currently in RICU 16 floor, family meeting yesterday to discuss goals of care.    CM to follow for discharge planning needs.  IRMA Artis RN  Case Management  [phone]

## 2020-12-12 VITALS
TEMPERATURE: 98 F | DIASTOLIC BLOOD PRESSURE: 65 MMHG | SYSTOLIC BLOOD PRESSURE: 148 MMHG | HEART RATE: 72 BPM | BODY MASS INDEX: 27.19 KG/M2 | RESPIRATION RATE: 18 BRPM | OXYGEN SATURATION: 94 % | WEIGHT: 153.44 LBS | HEIGHT: 63 IN

## 2020-12-12 LAB
ALBUMIN SERPL BCP-MCNC: 2.5 G/DL (ref 3.5–5.2)
ALP SERPL-CCNC: 59 U/L (ref 55–135)
ALT SERPL W/O P-5'-P-CCNC: 21 U/L (ref 10–44)
ANION GAP SERPL CALC-SCNC: 12 MMOL/L (ref 8–16)
ANISOCYTOSIS BLD QL SMEAR: SLIGHT
AST SERPL-CCNC: 21 U/L (ref 10–40)
BACTERIA BLD CULT: NORMAL
BACTERIA BLD CULT: NORMAL
BASOPHILS # BLD AUTO: 0.04 K/UL (ref 0–0.2)
BASOPHILS NFR BLD: 0.4 % (ref 0–1.9)
BILIRUB SERPL-MCNC: 0.2 MG/DL (ref 0.1–1)
BUN SERPL-MCNC: 83 MG/DL (ref 8–23)
CALCIUM SERPL-MCNC: 8 MG/DL (ref 8.7–10.5)
CHLORIDE SERPL-SCNC: 105 MMOL/L (ref 95–110)
CO2 SERPL-SCNC: 19 MMOL/L (ref 23–29)
CREAT SERPL-MCNC: 2 MG/DL (ref 0.5–1.4)
CRP SERPL-MCNC: 12.9 MG/L (ref 0–8.2)
DIFFERENTIAL METHOD: ABNORMAL
EOSINOPHIL # BLD AUTO: 0 K/UL (ref 0–0.5)
EOSINOPHIL NFR BLD: 0 % (ref 0–8)
ERYTHROCYTE [DISTWIDTH] IN BLOOD BY AUTOMATED COUNT: 13.3 % (ref 11.5–14.5)
EST. GFR  (AFRICAN AMERICAN): 26.4 ML/MIN/1.73 M^2
EST. GFR  (NON AFRICAN AMERICAN): 22.9 ML/MIN/1.73 M^2
FERRITIN SERPL-MCNC: 95 NG/ML (ref 20–300)
GLUCOSE SERPL-MCNC: 151 MG/DL (ref 70–110)
HCT VFR BLD AUTO: 28.7 % (ref 37–48.5)
HGB BLD-MCNC: 9.1 G/DL (ref 12–16)
IMM GRANULOCYTES # BLD AUTO: 0.21 K/UL (ref 0–0.04)
IMM GRANULOCYTES NFR BLD AUTO: 2.2 % (ref 0–0.5)
LDH SERPL L TO P-CCNC: 201 U/L (ref 110–260)
LYMPHOCYTES # BLD AUTO: 1.7 K/UL (ref 1–4.8)
LYMPHOCYTES NFR BLD: 17.9 % (ref 18–48)
MAGNESIUM SERPL-MCNC: 1.9 MG/DL (ref 1.6–2.6)
MCH RBC QN AUTO: 27.9 PG (ref 27–31)
MCHC RBC AUTO-ENTMCNC: 31.7 G/DL (ref 32–36)
MCV RBC AUTO: 88 FL (ref 82–98)
MONOCYTES # BLD AUTO: 1 K/UL (ref 0.3–1)
MONOCYTES NFR BLD: 10.4 % (ref 4–15)
NEUTROPHILS # BLD AUTO: 6.7 K/UL (ref 1.8–7.7)
NEUTROPHILS NFR BLD: 69.1 % (ref 38–73)
NRBC BLD-RTO: 0 /100 WBC
OVALOCYTES BLD QL SMEAR: ABNORMAL
PHOSPHATE SERPL-MCNC: 3.9 MG/DL (ref 2.7–4.5)
PLATELET # BLD AUTO: 260 K/UL (ref 150–350)
PLATELET BLD QL SMEAR: ABNORMAL
PMV BLD AUTO: 11.4 FL (ref 9.2–12.9)
POCT GLUCOSE: 114 MG/DL (ref 70–110)
POCT GLUCOSE: 199 MG/DL (ref 70–110)
POIKILOCYTOSIS BLD QL SMEAR: SLIGHT
POTASSIUM SERPL-SCNC: 3.6 MMOL/L (ref 3.5–5.1)
PROT SERPL-MCNC: 5.8 G/DL (ref 6–8.4)
RBC # BLD AUTO: 3.26 M/UL (ref 4–5.4)
SODIUM SERPL-SCNC: 136 MMOL/L (ref 136–145)
WBC # BLD AUTO: 9.63 K/UL (ref 3.9–12.7)

## 2020-12-12 PROCEDURE — 82728 ASSAY OF FERRITIN: CPT | Mod: HCNC

## 2020-12-12 PROCEDURE — 99900035 HC TECH TIME PER 15 MIN (STAT): Mod: HCNC

## 2020-12-12 PROCEDURE — 63600175 PHARM REV CODE 636 W HCPCS: Mod: HCNC | Performed by: STUDENT IN AN ORGANIZED HEALTH CARE EDUCATION/TRAINING PROGRAM

## 2020-12-12 PROCEDURE — 99238 PR HOSPITAL DISCHARGE DAY,<30 MIN: ICD-10-PCS | Mod: HCNC,GC,, | Performed by: HOSPITALIST

## 2020-12-12 PROCEDURE — 94664 DEMO&/EVAL PT USE INHALER: CPT | Mod: HCNC

## 2020-12-12 PROCEDURE — 84100 ASSAY OF PHOSPHORUS: CPT | Mod: HCNC

## 2020-12-12 PROCEDURE — 80053 COMPREHEN METABOLIC PANEL: CPT | Mod: HCNC

## 2020-12-12 PROCEDURE — 86140 C-REACTIVE PROTEIN: CPT | Mod: HCNC

## 2020-12-12 PROCEDURE — 94799 UNLISTED PULMONARY SVC/PX: CPT | Mod: HCNC

## 2020-12-12 PROCEDURE — 25000003 PHARM REV CODE 250: Mod: HCNC | Performed by: STUDENT IN AN ORGANIZED HEALTH CARE EDUCATION/TRAINING PROGRAM

## 2020-12-12 PROCEDURE — 83735 ASSAY OF MAGNESIUM: CPT | Mod: HCNC

## 2020-12-12 PROCEDURE — 83615 LACTATE (LD) (LDH) ENZYME: CPT | Mod: HCNC

## 2020-12-12 PROCEDURE — 36415 COLL VENOUS BLD VENIPUNCTURE: CPT | Mod: HCNC

## 2020-12-12 PROCEDURE — 27000646 HC AEROBIKA DEVICE: Mod: HCNC

## 2020-12-12 PROCEDURE — 94761 N-INVAS EAR/PLS OXIMETRY MLT: CPT | Mod: HCNC

## 2020-12-12 PROCEDURE — 94640 AIRWAY INHALATION TREATMENT: CPT | Mod: HCNC

## 2020-12-12 PROCEDURE — 85025 COMPLETE CBC W/AUTO DIFF WBC: CPT | Mod: HCNC

## 2020-12-12 PROCEDURE — 99238 HOSP IP/OBS DSCHRG MGMT 30/<: CPT | Mod: HCNC,GC,, | Performed by: HOSPITALIST

## 2020-12-12 PROCEDURE — 27000221 HC OXYGEN, UP TO 24 HOURS: Mod: HCNC

## 2020-12-12 RX ORDER — INSULIN ASPART 100 [IU]/ML
13 INJECTION, SOLUTION INTRAVENOUS; SUBCUTANEOUS
Status: DISCONTINUED | OUTPATIENT
Start: 2020-12-12 | End: 2020-12-12 | Stop reason: HOSPADM

## 2020-12-12 RX ORDER — LEVOFLOXACIN 500 MG/1
500 TABLET, FILM COATED ORAL DAILY
Qty: 1 TABLET | Refills: 1 | Status: SHIPPED | OUTPATIENT
Start: 2020-12-13 | End: 2020-12-14

## 2020-12-12 RX ADMIN — ALBUTEROL SULFATE 2 PUFF: 90 AEROSOL, METERED RESPIRATORY (INHALATION) at 07:12

## 2020-12-12 RX ADMIN — SODIUM BICARBONATE 650 MG TABLET 650 MG: at 08:12

## 2020-12-12 RX ADMIN — FLUTICASONE FUROATE AND VILANTEROL TRIFENATATE 1 PUFF: 200; 25 POWDER RESPIRATORY (INHALATION) at 07:12

## 2020-12-12 RX ADMIN — CARVEDILOL 50 MG: 25 TABLET, FILM COATED ORAL at 08:12

## 2020-12-12 RX ADMIN — DILTIAZEM HYDROCHLORIDE 180 MG: 180 CAPSULE, COATED, EXTENDED RELEASE ORAL at 11:12

## 2020-12-12 RX ADMIN — HEPARIN SODIUM 5000 UNITS: 5000 INJECTION INTRAVENOUS; SUBCUTANEOUS at 05:12

## 2020-12-12 RX ADMIN — INSULIN ASPART 13 UNITS: 100 INJECTION, SOLUTION INTRAVENOUS; SUBCUTANEOUS at 12:12

## 2020-12-12 RX ADMIN — INSULIN DETEMIR 18 UNITS: 100 INJECTION, SOLUTION SUBCUTANEOUS at 08:12

## 2020-12-12 RX ADMIN — Medication 2000 UNITS: at 08:12

## 2020-12-12 RX ADMIN — PRAVASTATIN SODIUM 40 MG: 40 TABLET ORAL at 08:12

## 2020-12-12 RX ADMIN — ASPIRIN 81 MG: 81 TABLET, COATED ORAL at 08:12

## 2020-12-12 RX ADMIN — Medication 500 MG: at 08:12

## 2020-12-12 RX ADMIN — INSULIN ASPART 15 UNITS: 100 INJECTION, SOLUTION INTRAVENOUS; SUBCUTANEOUS at 08:12

## 2020-12-12 RX ADMIN — HYDRALAZINE HYDROCHLORIDE 100 MG: 50 TABLET, FILM COATED ORAL at 08:12

## 2020-12-12 RX ADMIN — ALBUTEROL SULFATE 2 PUFF: 90 AEROSOL, METERED RESPIRATORY (INHALATION) at 02:12

## 2020-12-12 NOTE — PLAN OF CARE
Neuro:        AAOx4, [place], anxious & tearful to start shift      Resp:     could be on RA but pt requests O2 for comfort, states she wears CPAP @ home, if not d/c'd and MD agrees- may need to get CPAP @ HS ordered  Cardiac:    Vpaced 100% (has ICD), VSS  GI:     No BM this shift, denies N/V/D  :    voids w/out difficulty  Musculoskeletal:     gen weakness, requires SBA for ambulation & OOB activities  Integumentary:    intact     Plan:     monitor VS, labs, glucose, resp support  Other:      on 16th floor inpatient as well-- anticipate d/c home when medically appropriate

## 2020-12-12 NOTE — DISCHARGE SUMMARY
[facility] - ICU 14 [facility] Medicine  Discharge Summary      Patient Name: Myesha Quinones  MRN: [id number]  Admission Date: 12/6/2020  Hospital Length of Stay: 6 days  Discharge Date and Time:  12/12/2020 2:53 PM  Attending Physician: Onel Acosta MD   Discharging Provider: Rodrigo Schmidt MD  Primary Care Provider: Emelina Gaston MD      HPI:   This is an 80 year old female with pmhx of Asthma, CAD, ischemic cardiomyopathy, CHF, CKD3, insulin dependent T2DM,  breast cancer, HTN, HLD who was tested positive for COVID after her daughter and  had tested positive for it last week. She was initially sent home with normal vitals and examination. She was diagnosed on the 1st of this month. Today she presents with fevers, increased fatigue, loose stools, and dyspnea. She has a chronic cough with occasional green tinged sputum but is now having clear sputum. She called EMS and was found to be satting in the low 80's and responded to oxygen supplementation. Otherwise she does not have any other complaints. Denies any chest pain, headache, nausea, vomiting.    In the ED she was placed on 8L NC with saturations ~99%, hypertensive 161/67, afebrile. Laboratory evaluation revealed chronic anemia, mild hyponatremia. Imaging with x-ray of the chest revealed stable findings.    * No surgery found *      Hospital Course:   Patient was admitted for acute hypoxic resp failure 2/2 to COVID-19 PNA. She completed azithromycin that was prescribed outpatient. Wjhile here she received dexamethasone and remdesivir. Patient is on room air and appears to be doing well. Renal function remained stable. Failed 6MWT (desstated to 88% with ambulation but recovers quickly on RA). Patient going back and forth between RA and 1-2 L NC,  Approved for home O2. New VIRGILIO possibly due to poor intake, will give fluids and analyze urine. Blood sugar levels increasing, beta hydroxy but normal, will start insulin drip.   "    Consults:   Consults (From admission, onward)        Status Ordering Provider     Inpatient consult to Midline team  Once     Provider:  (Not yet assigned)    Completed SUDARSHAN HE          No new Assessment & Plan notes have been filed under this hospital service since the last note was generated.  Service: Hospital Medicine    Final Active Diagnoses:    Diagnosis Date Noted POA    PRINCIPAL PROBLEM:  Pneumonia due to COVID-19 virus [U07.1, J12.89] 12/06/2020 Yes    CKD (chronic kidney disease) stage 4, GFR 15-29 ml/min [N18.4] 07/27/2020 Yes    Acute hypoxemic respiratory failure [J96.01] 12/19/2019 Yes    VIRGILIO (acute kidney injury) [N17.9] 12/07/2019 Unknown    Uncontrolled type 2 diabetes mellitus with hyperglycemia [E11.65] 11/09/2016 Yes    Coronary artery disease involving native coronary artery without angina pectoris - Non-obstructive 50% LAD [I25.10] 07/20/2015 Yes    Hypertension associated with diabetes [E11.59, I10] 07/20/2015 Yes    Asthma, chronic [J45.909] 06/05/2013 Yes      Problems Resolved During this Admission:       Discharged Condition: good    Disposition: Home or Self Care    Follow Up:  Follow-up Information     Emelina Gaston MD On 12/16/2020.    Specialty: Internal Medicine  Why: Hospital Follow Up - first available appointment with Dr. Betsy Garcia in same clinic Wed Dec 16 at 9:30am  Contact information:  [address]  [facility] [address]  [phone]                 Patient Instructions:      OXYGEN FOR HOME USE     Order Specific Question Answer Comments   Liter Flow 2    Duration With activity    Qualifying SpO2: 88    Testing done at: Exercise/Activity    Route nasal cannula    Device home concentrator with portable unit    Length of need (in months): 3 mos    Height: 5' 3" (1.6 m)    Weight: 68.7 kg (151 lb 7.3 oz)    Does patient have medical equipment at home? walker, rolling    Alternative treatment measures have been tried or considered and deemed " clinically ineffective. Yes    Vendor: [facility]    Expected Date of Delivery: 12/9/2020      COVID-19 Surveillance Program     Order Specific Question Answer Comments   Does patient have a smartphone? No    Does patient have the [facility] on their smartphone? No    While in surveillance program, will patient be using home oxygen? Yes      Activity as tolerated       Significant Diagnostic Studies: Labs: All labs within the past 24 hours have been reviewed    Pending Diagnostic Studies:     Procedure Component Value Units Date/Time    Urinalysis, Reflex to Urine Culture Urine, Clean Catch [[id number]] Collected: 12/10/20 1214    Order Status: Sent Lab Status: In process Updated: 12/11/20 5893    Specimen: Urine          Medications:  Reconciled Home Medications:      Medication List      START taking these medications    ascorbic acid (vitamin C) 500 MG tablet  Commonly known as: VITAMIN C  Take 1 tablet (500 mg total) by mouth 2 (two) times daily. for 10 days     levoFLOXacin 500 MG tablet  Commonly known as: LEVAQUIN  Take 1 tablet (500 mg total) by mouth once daily. for 1 day  Start taking on: December 13, 2020     pulse oximeter device  Commonly known as: pulse oximeter  by Apply Externally route 2 (two) times a day. Use twice daily at 8 AM and 3 PM and record the value in [employer] as directed.        CHANGE how you take these medications    lancets Misc  Commonly known as: ACCU-CHEK SOFTCLIX LANCETS  Uses Accu-Chek Angelica meter to test BG 4x/day  What changed: additional instructions        CONTINUE taking these medications    acetaminophen 500 MG tablet  Commonly known as: TYLENOL  Take 1,000 mg by mouth daily as needed for Pain.     albuterol 90 mcg/actuation inhaler  Commonly known as: VENTOLIN HFA  INHALE 2 PUFFS INTO THE LUNGS EVERY 6 (SIX) HOURS AS NEEDED FOR WHEEZING. RESCUE     albuterol-ipratropium 2.5 mg-0.5 mg/3 mL nebulizer solution  Commonly known as: DUO-NEB  TAKE 1 VIAL BY NEBULIZATION EVERY 4  (FOUR) HOURS. RESCUE     B-12 DOTS ORAL  Take 1 tablet by mouth once daily.     benzonatate 100 MG capsule  Commonly known as: TESSALON PERLES  Take 2 capsules (200 mg total) by mouth 3 (three) times daily as needed for Cough.     blood sugar diagnostic Strp  Commonly known as: ACCU-CHEK HECTOR  Uses Accu-Check Hector meter to test BG 4x/day     carvediloL 25 MG tablet  Commonly known as: COREG  TAKE 2 TABLETS (50 MG TOTAL) BY MOUTH 2 (TWO) TIMES DAILY.     chlorthalidone 25 MG Tab  Commonly known as: HYGROTEN  Take 0.5 tablets (12.5 mg total) by mouth once daily.     cloNIDine 0.1 mg/24 hr td ptwk 0.1 mg/24 hr  Commonly known as: CATAPRES  PLACE 1 PATCH ONTO THE SKIN EVERY 7 DAYS.     diltiaZEM 180 MG 24 hr capsule  Commonly known as: CARDIZEM CD  Take 1 capsule (180 mg total) by mouth once daily.     ENTERIC COATED ASPIRIN 81 MG EC tablet  Generic drug: aspirin  Take 1 tablet by mouth once daily.     FISH  mg Cap  Generic drug: omega-3 fatty acids  Take 1 capsule by mouth Twice daily.     fluticasone propionate 50 mcg/actuation nasal spray  Commonly known as: FLONASE  2 sprays (100 mcg total) by Each Nostril route once daily.     fluticasone-salmeterol 500-50 mcg/dose 500-50 mcg/dose Dsdv diskus inhaler  Commonly known as: ADVAIR DISKUS  Inhale 1 puff into the lungs 2 (two) times daily. Controller     hydrALAZINE 100 MG tablet  Commonly known as: APRESOLINE  Take 1 tablet (100 mg total) by mouth 3 (three) times daily.     irbesartan 300 MG tablet  Commonly known as: AVAPRO  Take 1 tablet (300 mg total) by mouth every evening.     LANTUS SOLOSTAR U-100 INSULIN glargine 100 units/mL (3mL) SubQ pen  Generic drug: insulin  Inject 34 Units into the skin every evening. Cancel the 30 unit prescription     levocetirizine 5 MG tablet  Commonly known as: XYZAL  Take 1 tablet (5 mg total) by mouth every evening.     magnesium oxide 400 mg (241.3 mg magnesium) tablet  Commonly known as: MAG-OX  Take 1 tablet (400 mg  "total) by mouth once daily.     metFORMIN 500 MG tablet  Commonly known as: GLUCOPHAGE  Take 1 tablet (500 mg total) by mouth 2 (two) times daily with meals.     montelukast 10 mg tablet  Commonly known as: SINGULAIR  Take 1 tablet (10 mg total) by mouth every evening.     nitroGLYCERIN 0.4 MG SL tablet  Commonly known as: NITROSTAT  Place 0.4 mg under the tongue every 5 (five) minutes as needed for Chest pain.     pen needle, diabetic 31 gauge x 3/16" Ndle  Commonly known as: BD ULTRA-FINE MINI PEN NEEDLE  USE WITH LANTUS AT BEDTIME     pravastatin 40 MG tablet  Commonly known as: PRAVACHOL  TAKE 1 TABLET BY MOUTH EVERY DAY     TRADJENTA 5 mg Tab tablet  Generic drug: linaGLIPtin  Take 1 tablet (5 mg total) by mouth once daily.     VITAMIN D3 50 mcg (2,000 unit) Tab  Generic drug: cholecalciferol (vitamin D3)  Take 1 tablet by mouth once daily.        STOP taking these medications    amoxicillin-clavulanate 875-125mg 875-125 mg per tablet  Commonly known as: AUGMENTIN     azithromycin 250 MG tablet  Commonly known as: Z-ERYN     doxycycline 100 MG Cap  Commonly known as: VIBRAMYCIN            Indwelling Lines/Drains at time of discharge:   Lines/Drains/Airways     None                 Time spent on the discharge of patient: 45 minutes  Patient was seen and examined on the date of discharge and determined to be suitable for discharge.         Rodrigo Schmidt MD  Department of Hospital Medicine  [facility] - ICU 14 [facility]  "

## 2020-12-13 DIAGNOSIS — E11.42 DIABETIC POLYNEUROPATHY ASSOCIATED WITH TYPE 2 DIABETES MELLITUS: ICD-10-CM

## 2020-12-14 ENCOUNTER — NURSE TRIAGE (OUTPATIENT)
Dept: ADMINISTRATIVE | Facility: CLINIC | Age: 80
End: 2020-12-14

## 2020-12-14 ENCOUNTER — PATIENT MESSAGE (OUTPATIENT)
Dept: ADMINISTRATIVE | Facility: OTHER | Age: 80
End: 2020-12-14

## 2020-12-14 DIAGNOSIS — E11.42 DIABETIC POLYNEUROPATHY ASSOCIATED WITH TYPE 2 DIABETES MELLITUS: ICD-10-CM

## 2020-12-14 PROCEDURE — G0180 MD CERTIFICATION HHA PATIENT: HCPCS | Mod: ,,, | Performed by: INTERNAL MEDICINE

## 2020-12-14 PROCEDURE — G0180 PR HOME HEALTH MD CERTIFICATION: ICD-10-PCS | Mod: ,,, | Performed by: INTERNAL MEDICINE

## 2020-12-14 RX ORDER — LANCETS
EACH MISCELLANEOUS
Qty: 400 EACH | Refills: 3 | Status: SHIPPED | OUTPATIENT
Start: 2020-12-14 | End: 2020-12-22 | Stop reason: SDUPTHER

## 2020-12-14 RX ORDER — PEN NEEDLE, DIABETIC 30 GX3/16"
NEEDLE, DISPOSABLE MISCELLANEOUS
Qty: 400 EACH | Refills: 3 | Status: SHIPPED | OUTPATIENT
Start: 2020-12-14 | End: 2022-11-28 | Stop reason: SDUPTHER

## 2020-12-14 NOTE — TELEPHONE ENCOUNTER
Sp02: 96%    Pulse: 77    Temperature: 97.1    SOB at rest (0-5): 0    SOB with activity (0-5): 2    Worsening symptoms: No     Fever symptoms: No     Increased home oxygen: N/A       Called patient to review COVID-19 Surveillance Program enrollment process. Transferred to [facility] to complete    Smartphone: Yes     [facility]:    Program consent:    Pulse oximeter status: Yes     Verified emergency contacts:    Program Overview: Reviewed , no response process, and importance of correct emergency contacts in event that well-being check is warranted. Patient will call [phone] 24/7 to speak with an OnCall nurse, if needed. Pt aware that if Sp02 <94% or if they have any worsening symptoms, they need to go to the emergency department. If they are having a medical emergency, they will call [phone]. Otherwise, patient will continue to submit data as scheduled. Reviewed importance of wearing mask if self or family members leave the house.     Patient had no further questions.

## 2020-12-14 NOTE — PHYSICIAN QUERY
PT Name: Myesha Quinones  MR #: [id number]     Diagnosis Clarification      CDS/: Stefany Cruz               Contact information:[email or web address]    This form is a permanent document in the medical record.     Query Date: December 14, 2020    Dear Provider,  By submitting this query, we are merely seeking further clarification of documentation.  Please utilize your independent clinical judgment when addressing the question(s) below.     The medical record contains the following:    Supporting Clinical Information Location in Medical Record   steroids may have induced a mild DKA. Insulin drip today     spoke to RN that if pt needs to visit her  in ICU we can hold insulin drip so she can visit    Blood sugar levels increasing, beta hydroxy but normal, will start insulin drip.     Beta-Hydroxybutyrate = 0.2    Insulin Regular 0.52 units/hr IV      Glucose 188 (H) 69 (L) 278 (H) 229 (H) 245 (H) 470 (HH) 504 (HH) 339 (H) 151 (H)     PN 12/11         PN 12/11 - DS 12/12    Labs 12/11    MAR 12/11      Labs 12/6 - 12/12     Please clarify if the __DKA__ diagnosis has been:    [x  ] Ruled In   [  ] Ruled Out   [  ] Other/Clarification of findings (please specify)_______________    [   ] Clinically undetermined     Please document in your progress notes daily for the duration of treatment, until resolved, and include in your discharge summary.

## 2020-12-14 NOTE — PHYSICIAN QUERY
PT Name: Myesha Quinones  MR #: [id number]     RESPIRATORY CONDITION CLARIFICATION     CDS/: Stefany Cruz               Contact information:[email or web address]  This form is a permanent document in the medical record.    Query Date: December 14, 2020    By submitting this query, we are merely seeking further clarification of documentation.  Please utilize your independent clinical judgment when addressing the question(s) below.  The Medical Record contains the following:   Indicators   Supporting Clinical Findings Location in Medical Record   x Pneumonia documented Viral Pneumonia due to COVID-19     can add ceftriaxone of there is further concern for concomitant bacterial PNA    H&P 12/7 - DS 12/12    H&P 12/7 -  PN 12/11   x Chest X-Ray/CT Scan Cardiac device and grossly stable chronic findings without radiographic evidence of failure, new focal consolidation, or other source of cough/shortness of breath, noting that early/mild viral pneumonia may be radiographically occult.   CXR 12/6    PaO2    PaCO2     O2 sat     x WBC   WBC 10.13 6.74 5.47 9.57 5.80 8.02 9.63        Labs 12/6 - 12/12   x Vital Signs Vital Signs (24h Range):   Temp: [97.7 °F (36.5 °C)-98.4 °F (36.9 °C)]   Pulse: [69-94]   Resp: [16-32]   SpO2: [94 %-97 %]   BP: (134-175)/(61-77)    PN 12/8   x Cultures  No S aureus isolated.  Pseudomonas aeruginosa   Many   Normal respiratory irina also present   <10 epithelial cells per low power field.  Few WBC's   Many Gram negative rods  Few Gram positive cocci   Resp cx 12/7   x Treatment  resp cultures positive for pseudomonas, levofloxacin started 750 (then changed to 500 since VIRGILIO    PN 12/11   x Supplemental O2 On 12/9 patient able to tolerate RA satting >95. Failed 6MWT (desstated to 88% with ambulation but recovers quickly on RA). Patient going back and forth between RA and 1-2 L NC, Approved for home O2    EMS was called on presentation patient was ill appearing and satt'ing  in the low 80s and she was placed on a COVID mask. Patient was placed on 8L in the ED and she was satt'ing in the high 90s.   HM PN 12/11        ED Provider Note 12/6    Dysphagia/Swallow study     x Other Patient was admitted for acute hypoxic resp failure 2/2 to COVID-19 PNA   DS 12/12     Provider, please clarify the pneumonia diagnosis related to above clinical indicators.  Percy all that apply:    [ x  ] Pseudomonas Pneumonia   [  x ] COVID-19 Pneumonia   [  ] Clinically undetermined     Please document in your progress notes daily for the duration of treatment, until resolved, and include in your discharge summary.     Form No. [id number]

## 2020-12-14 NOTE — TELEPHONE ENCOUNTER
Spoke to pt and scheduled audio appt 12/16 at 3pm    Siliq Pregnancy And Lactation Text: The risk during pregnancy and breastfeeding is uncertain with this medication.

## 2020-12-14 NOTE — PROGRESS NOTES
Patient has not submitted any recent data for HTN Digital Medicine. Patient recently discharged from ED due to pneumonia related to COVID-19.  Will follow up once more data is available to review.     Last 5 Patient Entered Readings                                      Current 30 Day Average: 146/64     Recent Readings 11/18/2020 11/15/2020 11/11/2020 11/10/2020 11/7/2020    SBP (mmHg) 157 134 145 181 149    DBP (mmHg) 68 60 59 77 69    Pulse 75 75 76 77 77

## 2020-12-14 NOTE — PHYSICIAN QUERY
PT Name: Myesha Quinones  MR #: [id number]    Hypertensive Condition Clarification      CDS/: Stefany Cruz               Contact information: [email or web address]    This form is a permanent document in the medical record.     Query Date: December 14, 2020    By submitting this query, we are merely seeking further clarification of documentation. Please utilize your independent clinical judgment when addressing the question(s) below.    The Medical Record contains the following:   Indicators   Supporting Clinical Findings Location in Medical Record   x Hypertension associated with diabetes documented Hypertension associated with diabetes     PN 12/8 - DS 12/12   x Chronic condition(s) Uncontrolled type 2 diabetes mellitus with hyperglycemia     PN 12/8   x Vital signs Vital Signs (24h Range):   Temp: [97.7 °F (36.5 °C)-98.4 °F (36.9 °C)]   Pulse: [69-94]   Resp: [16-32]    SpO2: [94 %-97 %]   BP: (134-175)/(61-77)     PN 12/8   x Treatment/Medication HOME MEDS = carvedilol 50 mg BID, irbesartan 300 mg qday, diltiazem 180 mg qday, chlorthalidone 12.5 mg qday, hydralazine 100 mg TID, clonidine patch 0.1 mg/24 h (taken weekly)   - hold chlorthalidone in context of COVID infection, continue other home medications   - on weekly clonidine patch     HOME MED = linagliptin, metformin, Lantus 34U QHS   - Increased 20U detemir TID, insulin aspart 9u TID. Closely following glucose checks. Will transition to insulin drip if unimproved   - MDSSI, POCT glucose AC/HS   - diabetic diet     PN 12/8    Other       Provider, please clarify the relationship between the Hypertension and Diabetes Mellitus    [  x ] Hypertension is a manifestation of Diabetes Mellitus (Secondary Hypertension)   [   ]  Hypertension is not a manifestation of Diabetes Mellitus (Essential Hypertension)   [   ] Other Clarification (please specify): ____________   [  ] Clinically Undetermined     Please document in your progress notes  daily for the duration of treatment, until resolved, and include in your discharge summary.

## 2020-12-15 ENCOUNTER — PATIENT MESSAGE (OUTPATIENT)
Dept: ADMINISTRATIVE | Facility: OTHER | Age: 80
End: 2020-12-15

## 2020-12-15 ENCOUNTER — TELEPHONE (OUTPATIENT)
Dept: INTERNAL MEDICINE | Facility: CLINIC | Age: 80
End: 2020-12-15

## 2020-12-15 ENCOUNTER — PATIENT MESSAGE (OUTPATIENT)
Dept: OTHER | Facility: OTHER | Age: 80
End: 2020-12-15

## 2020-12-15 NOTE — TELEPHONE ENCOUNTER
----- Message from Cyndi Sánchez sent at 12/15/2020 11:47 AM CST -----  Contact: [phone]  Pt would like call back to discuss test strips -she has needles she needs test strips

## 2020-12-16 ENCOUNTER — OFFICE VISIT (OUTPATIENT)
Dept: INTERNAL MEDICINE | Facility: CLINIC | Age: 80
End: 2020-12-16
Payer: MEDICARE

## 2020-12-16 ENCOUNTER — PATIENT MESSAGE (OUTPATIENT)
Dept: ADMINISTRATIVE | Facility: OTHER | Age: 80
End: 2020-12-16

## 2020-12-16 DIAGNOSIS — Z79.4 CONTROLLED TYPE 2 DIABETES MELLITUS WITH BOTH EYES AFFECTED BY MILD NONPROLIFERATIVE RETINOPATHY AND MACULAR EDEMA, WITH LONG-TERM CURRENT USE OF INSULIN: ICD-10-CM

## 2020-12-16 DIAGNOSIS — U07.1 COVID-19: ICD-10-CM

## 2020-12-16 DIAGNOSIS — I10 ESSENTIAL HYPERTENSION: ICD-10-CM

## 2020-12-16 DIAGNOSIS — Z09 HOSPITAL DISCHARGE FOLLOW-UP: Primary | ICD-10-CM

## 2020-12-16 DIAGNOSIS — E11.3213 CONTROLLED TYPE 2 DIABETES MELLITUS WITH BOTH EYES AFFECTED BY MILD NONPROLIFERATIVE RETINOPATHY AND MACULAR EDEMA, WITH LONG-TERM CURRENT USE OF INSULIN: ICD-10-CM

## 2020-12-16 PROCEDURE — 1159F PR MEDICATION LIST DOCUMENTED IN MEDICAL RECORD: ICD-10-PCS | Mod: HCNC,95,, | Performed by: INTERNAL MEDICINE

## 2020-12-16 PROCEDURE — 99442 PR PHYSICIAN TELEPHONE EVALUATION 11-20 MIN: CPT | Mod: HCNC,95,, | Performed by: INTERNAL MEDICINE

## 2020-12-16 PROCEDURE — 99442 PR PHYSICIAN TELEPHONE EVALUATION 11-20 MIN: ICD-10-PCS | Mod: HCNC,95,, | Performed by: INTERNAL MEDICINE

## 2020-12-16 PROCEDURE — 1159F MED LIST DOCD IN RCRD: CPT | Mod: HCNC,95,, | Performed by: INTERNAL MEDICINE

## 2020-12-16 NOTE — PLAN OF CARE
Patient discharged home with family.  Accepted by [facility].  Home oxygen set up completed - (O)HME.  CM scheduled post hospital follow up appointment with PCP.    Future Appointments   Date Time Provider Department Center   12/16/2020  3:00 PM Emelina Gaston MD [facility] IM [facility]   12/17/2020  9:30 AM Nargis Galvan PA-C [facility] CARDIO [address]   12/21/2020  9:00 AM INFUSION, CHAIR 9 [facility]F [facility]   12/28/2020  9:00 AM Erika Nguyen MD [facility] DERM [facility]   12/28/2020 10:30 AM INFUSION, CHAIR 9 [facility] AM[facility]F [facility]   1/4/2021  9:00 AM INFUSION, CHAIR 9 [facility] INF [facility]   1/11/2021  9:00 AM INFUSION, CHAIR 9 [facility] AMB INF [facility]   1/19/2021  9:00 AM INFUSION, CHAIR 9 [facility] AMB INF [facility]   1/25/2021  9:00 AM INFUSION, CHAIR 6 [facility] AMB INF [facility]   2/1/2021  9:00 AM INFUSION, CHAIR 3 [facility] AMB INF [facility]   2/8/2021  9:00 AM INFUSION, CHAIR 1 [facility] AMB INF [facility]   2/12/2021 10:00 AM HOME MONITOR DEVICE CHECK, [facility] NOM [facility]   2/15/2021  9:00 AM INFUSION, CHAIR 1 [facility] AMB INF [facility]   3/15/2021  9:00 AM Elidia Madison NP [facility] ENDODIA [facility]      CM to follow for discharge planning needs.  IRMA Artis RN  Case Management  [id number]      12/15/20 1803   Final Note   Assessment Type Final Discharge Note   Anticipated Discharge Disposition Home-Health  ([facility])   Hospital Follow Up  Appt(s) scheduled? Yes   Discharge plans and expectations educations in teach back method with documentation complete? Yes  (provided by bedside nurse)   Post-Acute Status   Post-Acute Authorization Home Health   Home Health Status Set-up Complete   Discharge Delays None known at this time

## 2020-12-17 ENCOUNTER — DOCUMENT SCAN (OUTPATIENT)
Dept: HOME HEALTH SERVICES | Facility: HOSPITAL | Age: 80
End: 2020-12-17
Payer: MEDICARE

## 2020-12-17 ENCOUNTER — PATIENT MESSAGE (OUTPATIENT)
Dept: ADMINISTRATIVE | Facility: OTHER | Age: 80
End: 2020-12-17

## 2020-12-17 NOTE — PROGRESS NOTES
Established Patient - Audio Only Telehealth Visit     The patient location is: home  The chief complaint leading to consultation is: hospital discharge  Visit type: Virtual visit with audio only (telephone)  Total time spent with patient: 20       The reason for the audio only service rather than synchronous audio and video virtual visit was related to technical difficulties or patient preference/necessity.     Each patient to whom I provide medical services by telemedicine is:  (1) informed of the relationship between the physician and patient and the respective role of any other health care provider with respect to management of the patient; and (2) notified that they may decline to receive medical services by telemedicine and may withdraw from such care at any time. Patient verbally consented to receive this service via voice-only telephone call.       HPI:  Transitional Care Note    Family and/or NoCaretaker present at visit?  .  Diagnostic tests reviewed/disposition: No diagnosic tests pending after this hospitalization.  Disease/illness education: yes  Home health/community services discussion/referrals: Patient has home health established at [facility].   Establishment or re-establishment of referral orders for community resources: No other necessary community resources.   Discussion with other health care providers: No discussion with other health care providers necessary.              Assessment and plan:    Patient reports that her oxygen level is 97 without supplemental oxygen although she has 2 L of home oxygen.  She reports that the nurse will be back this week but she has no other problems she has had no medication changes since her hospitalization for COVID.                        This service was not originating from a related E/M service provided within the previous 7 days nor will  to an E/M service or procedure within the next 24 hours or my soonest available appointment.  Prevailing standard  of care was able to be met in this audio-only visit.

## 2020-12-18 ENCOUNTER — PATIENT MESSAGE (OUTPATIENT)
Dept: ADMINISTRATIVE | Facility: OTHER | Age: 80
End: 2020-12-18

## 2020-12-19 ENCOUNTER — PATIENT MESSAGE (OUTPATIENT)
Dept: ADMINISTRATIVE | Facility: OTHER | Age: 80
End: 2020-12-19

## 2020-12-19 ENCOUNTER — NURSE TRIAGE (OUTPATIENT)
Dept: ADMINISTRATIVE | Facility: CLINIC | Age: 80
End: 2020-12-19

## 2020-12-19 NOTE — TELEPHONE ENCOUNTER
Pt escalated for SpO2 of 94. Spoke with pt and upon recheck SpO2 was 95 and HR 86. Pt denies any SOB.   Pt has OOC number for worsening symptoms. Will continue to monitor.    Reason for Disposition   Health Information question, no triage required and triager able to answer question    Protocols used: INFORMATION ONLY CALL - NO TRIAGE-A-

## 2020-12-20 ENCOUNTER — PATIENT MESSAGE (OUTPATIENT)
Dept: ADMINISTRATIVE | Facility: OTHER | Age: 80
End: 2020-12-20

## 2020-12-21 ENCOUNTER — PATIENT MESSAGE (OUTPATIENT)
Dept: ADMINISTRATIVE | Facility: OTHER | Age: 80
End: 2020-12-21

## 2020-12-22 ENCOUNTER — PATIENT MESSAGE (OUTPATIENT)
Dept: ADMINISTRATIVE | Facility: OTHER | Age: 80
End: 2020-12-22

## 2020-12-23 ENCOUNTER — PATIENT MESSAGE (OUTPATIENT)
Dept: ADMINISTRATIVE | Facility: OTHER | Age: 80
End: 2020-12-23

## 2020-12-24 ENCOUNTER — PATIENT MESSAGE (OUTPATIENT)
Dept: ADMINISTRATIVE | Facility: OTHER | Age: 80
End: 2020-12-24

## 2020-12-25 ENCOUNTER — PATIENT MESSAGE (OUTPATIENT)
Dept: ADMINISTRATIVE | Facility: OTHER | Age: 80
End: 2020-12-25

## 2020-12-25 ENCOUNTER — NURSE TRIAGE (OUTPATIENT)
Dept: ADMINISTRATIVE | Facility: CLINIC | Age: 80
End: 2020-12-25

## 2020-12-26 ENCOUNTER — PATIENT MESSAGE (OUTPATIENT)
Dept: ADMINISTRATIVE | Facility: OTHER | Age: 80
End: 2020-12-26

## 2020-12-28 ENCOUNTER — EXTERNAL HOME HEALTH (OUTPATIENT)
Dept: HOME HEALTH SERVICES | Facility: HOSPITAL | Age: 80
End: 2020-12-28
Payer: MEDICARE

## 2020-12-30 ENCOUNTER — TELEPHONE (OUTPATIENT)
Dept: NEPHROLOGY | Facility: CLINIC | Age: 80
End: 2020-12-30

## 2020-12-30 NOTE — TELEPHONE ENCOUNTER
Called pt daughter informed her that she would need to speak with infusion in order to require about orders for this /then she also need to call infusion to reschedule  because pt daughter informed me on today that pt is covid positive /I transferred her to infusion center by phone       ----- Message from Jenny Mccoy sent at 12/30/2020  8:36 AM CST -----  Regarding: Infusions  Contact: pt's daughter  Pt's appointments for infusion appear not to have an order attached.   Please verify     Pt's daughter would like to be called back regarding  the upcoming infusions.   It has been awhile since this was scheduled and blood test run  PT is feeling better.    Concerned that her blood level might be ok now   Please call and advise    Betsy Bowen     Pt's daughter can be reached at [phone]

## 2020-12-31 ENCOUNTER — TELEPHONE (OUTPATIENT)
Dept: ELECTROPHYSIOLOGY | Facility: CLINIC | Age: 80
End: 2020-12-31

## 2020-12-31 NOTE — TELEPHONE ENCOUNTER
Spoke with pt daughter, Betsy. Says that Mrs. Stevenson just lost her  and needs a little time before she'll be feeling up to coming in for an appt. Informed daughter that I would make the appointment for March to give Mrs. Stevenson some time before she has to come in for her f/u. Her daughter verbalized understanding and appreciates us working with their family at this time.

## 2020-12-31 NOTE — TELEPHONE ENCOUNTER
----- Message from Luz Cabral sent at 12/30/2020  2:15 PM CST -----   Overdue for MD visit and device check in clinic (MDT CRTD)    She apparently canceled her appts or didn't show for appt    Thanks

## 2021-01-05 ENCOUNTER — OFFICE VISIT (OUTPATIENT)
Dept: INFECTIOUS DISEASES | Facility: CLINIC | Age: 81
End: 2021-01-05
Payer: MEDICARE

## 2021-01-05 DIAGNOSIS — I25.10 CORONARY ARTERY DISEASE INVOLVING NATIVE CORONARY ARTERY OF NATIVE HEART WITHOUT ANGINA PECTORIS: ICD-10-CM

## 2021-01-05 DIAGNOSIS — U07.1 COVID-19: Primary | ICD-10-CM

## 2021-01-05 DIAGNOSIS — D50.8 OTHER IRON DEFICIENCY ANEMIA: ICD-10-CM

## 2021-01-05 DIAGNOSIS — E11.65 UNCONTROLLED TYPE 2 DIABETES MELLITUS WITH HYPERGLYCEMIA: ICD-10-CM

## 2021-01-05 PROCEDURE — 99441 PR PHYSICIAN TELEPHONE EVALUATION 5-10 MIN: ICD-10-PCS | Mod: HCNC,95,, | Performed by: INTERNAL MEDICINE

## 2021-01-05 PROCEDURE — 99441 PR PHYSICIAN TELEPHONE EVALUATION 5-10 MIN: CPT | Mod: HCNC,95,, | Performed by: INTERNAL MEDICINE

## 2021-01-22 ENCOUNTER — HOSPITAL ENCOUNTER (EMERGENCY)
Facility: HOSPITAL | Age: 81
Discharge: HOME | End: 2021-01-22
Attending: EMERGENCY MEDICINE
Payer: MEDICARE

## 2021-01-22 ENCOUNTER — PATIENT MESSAGE (OUTPATIENT)
Dept: OTOLARYNGOLOGY | Facility: CLINIC | Age: 81
End: 2021-01-22

## 2021-01-22 VITALS
HEART RATE: 93 BPM | RESPIRATION RATE: 18 BRPM | BODY MASS INDEX: 27.11 KG/M2 | OXYGEN SATURATION: 95 % | HEIGHT: 63 IN | TEMPERATURE: 98 F | DIASTOLIC BLOOD PRESSURE: 82 MMHG | WEIGHT: 153 LBS | SYSTOLIC BLOOD PRESSURE: 187 MMHG

## 2021-01-22 DIAGNOSIS — H65.32 CHRONIC MUCOID OTITIS MEDIA OF LEFT EAR: Primary | ICD-10-CM

## 2021-01-22 DIAGNOSIS — H60.62 CHRONIC OTITIS EXTERNA OF LEFT EAR, UNSPECIFIED TYPE: ICD-10-CM

## 2021-01-22 LAB — POCT GLUCOSE: 107 MG/DL (ref 70–110)

## 2021-01-22 PROCEDURE — 82962 GLUCOSE BLOOD TEST: CPT

## 2021-01-22 PROCEDURE — 99284 EMERGENCY DEPT VISIT MOD MDM: CPT | Mod: 25

## 2021-01-22 RX ORDER — CIPROFLOXACIN AND DEXAMETHASONE 3; 1 MG/ML; MG/ML
4 SUSPENSION/ DROPS AURICULAR (OTIC) 2 TIMES DAILY
Qty: 7.5 ML | Refills: 0 | Status: SHIPPED | OUTPATIENT
Start: 2021-01-22 | End: 2021-01-29

## 2021-01-22 RX ORDER — CETIRIZINE HYDROCHLORIDE 10 MG/1
10 TABLET ORAL DAILY
Qty: 30 TABLET | Refills: 0 | Status: SHIPPED | OUTPATIENT
Start: 2021-01-22 | End: 2021-02-24

## 2021-01-22 RX ORDER — AMOXICILLIN AND CLAVULANATE POTASSIUM 875; 125 MG/1; MG/1
1 TABLET, FILM COATED ORAL 2 TIMES DAILY
Qty: 14 TABLET | Refills: 0 | Status: SHIPPED | OUTPATIENT
Start: 2021-01-22 | End: 2021-02-24

## 2021-01-22 RX ORDER — CETIRIZINE HYDROCHLORIDE 10 MG/1
10 TABLET ORAL DAILY
Qty: 30 TABLET | Refills: 0 | Status: SHIPPED | OUTPATIENT
Start: 2021-01-22 | End: 2021-01-22 | Stop reason: SDUPTHER

## 2021-01-25 ENCOUNTER — INFUSION (OUTPATIENT)
Dept: INFECTIOUS DISEASES | Facility: HOSPITAL | Age: 81
End: 2021-01-25
Attending: INTERNAL MEDICINE
Payer: MEDICARE

## 2021-01-25 VITALS
HEIGHT: 63 IN | DIASTOLIC BLOOD PRESSURE: 74 MMHG | OXYGEN SATURATION: 96 % | SYSTOLIC BLOOD PRESSURE: 172 MMHG | WEIGHT: 159.19 LBS | HEART RATE: 91 BPM | BODY MASS INDEX: 28.21 KG/M2 | RESPIRATION RATE: 18 BRPM | TEMPERATURE: 98 F

## 2021-01-25 DIAGNOSIS — D50.8 OTHER IRON DEFICIENCY ANEMIA: Primary | ICD-10-CM

## 2021-01-25 PROCEDURE — 96365 THER/PROPH/DIAG IV INF INIT: CPT

## 2021-01-25 PROCEDURE — 63600175 PHARM REV CODE 636 W HCPCS: Performed by: INTERNAL MEDICINE

## 2021-01-25 PROCEDURE — 25000003 PHARM REV CODE 250: Performed by: INTERNAL MEDICINE

## 2021-01-25 RX ORDER — HEPARIN 100 UNIT/ML
500 SYRINGE INTRAVENOUS
Status: DISCONTINUED | OUTPATIENT
Start: 2021-01-25 | End: 2021-01-25 | Stop reason: HOSPADM

## 2021-01-25 RX ORDER — SODIUM CHLORIDE 0.9 % (FLUSH) 0.9 %
10 SYRINGE (ML) INJECTION
Status: CANCELLED | OUTPATIENT
Start: 2021-02-01

## 2021-01-25 RX ORDER — HEPARIN 100 UNIT/ML
500 SYRINGE INTRAVENOUS
Status: CANCELLED | OUTPATIENT
Start: 2021-02-01

## 2021-01-25 RX ORDER — SODIUM CHLORIDE 0.9 % (FLUSH) 0.9 %
10 SYRINGE (ML) INJECTION
Status: DISCONTINUED | OUTPATIENT
Start: 2021-01-25 | End: 2021-01-25 | Stop reason: HOSPADM

## 2021-01-25 RX ADMIN — IRON SUCROSE 100 MG: 20 INJECTION, SOLUTION INTRAVENOUS at 09:01

## 2021-01-25 RX ADMIN — SODIUM CHLORIDE: 0.9 INJECTION, SOLUTION INTRAVENOUS at 09:01

## 2021-01-26 ENCOUNTER — TELEPHONE (OUTPATIENT)
Dept: INTERNAL MEDICINE | Facility: CLINIC | Age: 81
End: 2021-01-26

## 2021-01-26 DIAGNOSIS — E11.65 UNCONTROLLED TYPE 2 DIABETES MELLITUS WITH HYPERGLYCEMIA: Primary | ICD-10-CM

## 2021-01-26 DIAGNOSIS — E55.9 MILD VITAMIN D DEFICIENCY: ICD-10-CM

## 2021-02-01 ENCOUNTER — INFUSION (OUTPATIENT)
Dept: INFECTIOUS DISEASES | Facility: HOSPITAL | Age: 81
End: 2021-02-01
Attending: INTERNAL MEDICINE
Payer: MEDICARE

## 2021-02-01 VITALS
TEMPERATURE: 98 F | BODY MASS INDEX: 28.39 KG/M2 | SYSTOLIC BLOOD PRESSURE: 150 MMHG | HEIGHT: 63 IN | WEIGHT: 160.25 LBS | DIASTOLIC BLOOD PRESSURE: 65 MMHG | OXYGEN SATURATION: 96 % | HEART RATE: 91 BPM | RESPIRATION RATE: 17 BRPM

## 2021-02-01 DIAGNOSIS — D50.8 OTHER IRON DEFICIENCY ANEMIA: Primary | ICD-10-CM

## 2021-02-01 PROCEDURE — 63600175 PHARM REV CODE 636 W HCPCS: Performed by: INTERNAL MEDICINE

## 2021-02-01 PROCEDURE — 96365 THER/PROPH/DIAG IV INF INIT: CPT

## 2021-02-01 PROCEDURE — 25000003 PHARM REV CODE 250: Performed by: INTERNAL MEDICINE

## 2021-02-01 RX ORDER — HEPARIN 100 UNIT/ML
500 SYRINGE INTRAVENOUS
Status: CANCELLED | OUTPATIENT
Start: 2021-02-08

## 2021-02-01 RX ORDER — SODIUM CHLORIDE 0.9 % (FLUSH) 0.9 %
10 SYRINGE (ML) INJECTION
Status: DISCONTINUED | OUTPATIENT
Start: 2021-02-01 | End: 2021-02-01 | Stop reason: HOSPADM

## 2021-02-01 RX ORDER — SODIUM CHLORIDE 0.9 % (FLUSH) 0.9 %
10 SYRINGE (ML) INJECTION
Status: CANCELLED | OUTPATIENT
Start: 2021-02-08

## 2021-02-01 RX ORDER — HEPARIN 100 UNIT/ML
500 SYRINGE INTRAVENOUS
Status: DISCONTINUED | OUTPATIENT
Start: 2021-02-01 | End: 2021-02-01 | Stop reason: HOSPADM

## 2021-02-01 RX ADMIN — IRON SUCROSE 100 MG: 20 INJECTION, SOLUTION INTRAVENOUS at 09:02

## 2021-02-01 RX ADMIN — SODIUM CHLORIDE: 9 INJECTION, SOLUTION INTRAVENOUS at 09:02

## 2021-02-03 ENCOUNTER — CLINICAL SUPPORT (OUTPATIENT)
Dept: OTOLARYNGOLOGY | Facility: CLINIC | Age: 81
End: 2021-02-03
Payer: MEDICARE

## 2021-02-03 ENCOUNTER — OFFICE VISIT (OUTPATIENT)
Dept: OTOLARYNGOLOGY | Facility: CLINIC | Age: 81
End: 2021-02-03
Payer: MEDICARE

## 2021-02-03 VITALS
HEART RATE: 79 BPM | WEIGHT: 159.63 LBS | SYSTOLIC BLOOD PRESSURE: 163 MMHG | BODY MASS INDEX: 28.29 KG/M2 | DIASTOLIC BLOOD PRESSURE: 69 MMHG | HEIGHT: 63 IN

## 2021-02-03 DIAGNOSIS — H90.A21 SENSORINEURAL HEARING LOSS (SNHL) OF RIGHT EAR WITH RESTRICTED HEARING OF LEFT EAR: ICD-10-CM

## 2021-02-03 DIAGNOSIS — H90.A32 MIXED CONDUCTIVE AND SENSORINEURAL HEARING LOSS OF LEFT EAR WITH RESTRICTED HEARING OF RIGHT EAR: ICD-10-CM

## 2021-02-03 DIAGNOSIS — H90.A32 MIXED CONDUCTIVE AND SENSORINEURAL HEARING LOSS OF LEFT EAR WITH RESTRICTED HEARING OF RIGHT EAR: Primary | ICD-10-CM

## 2021-02-03 DIAGNOSIS — H65.22 LEFT CHRONIC SEROUS OTITIS MEDIA: Primary | Chronic | ICD-10-CM

## 2021-02-03 DIAGNOSIS — H83.8X2 SUPERIOR SEMICIRCULAR CANAL DEHISCENCE OF LEFT EAR: Chronic | ICD-10-CM

## 2021-02-03 PROCEDURE — 1159F PR MEDICATION LIST DOCUMENTED IN MEDICAL RECORD: ICD-10-PCS | Mod: S$GLB,,, | Performed by: OTOLARYNGOLOGY

## 2021-02-03 PROCEDURE — 92553 PR AUDIOMETRY, AIR & BONE: ICD-10-PCS | Mod: S$GLB,,, | Performed by: AUDIOLOGIST-HEARING AID FITTER

## 2021-02-03 PROCEDURE — 99999 PR PBB SHADOW E&M-EST. PATIENT-LVL V: CPT | Mod: PBBFAC,,, | Performed by: OTOLARYNGOLOGY

## 2021-02-03 PROCEDURE — 3077F PR MOST RECENT SYSTOLIC BLOOD PRESSURE >= 140 MM HG: ICD-10-PCS | Mod: CPTII,S$GLB,, | Performed by: OTOLARYNGOLOGY

## 2021-02-03 PROCEDURE — 3077F SYST BP >= 140 MM HG: CPT | Mod: CPTII,S$GLB,, | Performed by: OTOLARYNGOLOGY

## 2021-02-03 PROCEDURE — 99214 OFFICE O/P EST MOD 30 MIN: CPT | Mod: 25,S$GLB,, | Performed by: OTOLARYNGOLOGY

## 2021-02-03 PROCEDURE — 3288F FALL RISK ASSESSMENT DOCD: CPT | Mod: CPTII,S$GLB,, | Performed by: OTOLARYNGOLOGY

## 2021-02-03 PROCEDURE — 1126F PR PAIN SEVERITY QUANTIFIED, NO PAIN PRESENT: ICD-10-PCS | Mod: S$GLB,,, | Performed by: OTOLARYNGOLOGY

## 2021-02-03 PROCEDURE — 92567 TYMPANOMETRY: CPT | Mod: S$GLB,,, | Performed by: AUDIOLOGIST-HEARING AID FITTER

## 2021-02-03 PROCEDURE — 3288F PR FALLS RISK ASSESSMENT DOCUMENTED: ICD-10-PCS | Mod: CPTII,S$GLB,, | Performed by: OTOLARYNGOLOGY

## 2021-02-03 PROCEDURE — 99214 PR OFFICE/OUTPT VISIT, EST, LEVL IV, 30-39 MIN: ICD-10-PCS | Mod: 25,S$GLB,, | Performed by: OTOLARYNGOLOGY

## 2021-02-03 PROCEDURE — 1126F AMNT PAIN NOTED NONE PRSNT: CPT | Mod: S$GLB,,, | Performed by: OTOLARYNGOLOGY

## 2021-02-03 PROCEDURE — 92567 PR TYMPA2METRY: ICD-10-PCS | Mod: S$GLB,,, | Performed by: AUDIOLOGIST-HEARING AID FITTER

## 2021-02-03 PROCEDURE — 92504 EAR MICROSCOPY EXAMINATION: CPT | Mod: S$GLB,,, | Performed by: OTOLARYNGOLOGY

## 2021-02-03 PROCEDURE — 3078F PR MOST RECENT DIASTOLIC BLOOD PRESSURE < 80 MM HG: ICD-10-PCS | Mod: CPTII,S$GLB,, | Performed by: OTOLARYNGOLOGY

## 2021-02-03 PROCEDURE — 3078F DIAST BP <80 MM HG: CPT | Mod: CPTII,S$GLB,, | Performed by: OTOLARYNGOLOGY

## 2021-02-03 PROCEDURE — 1159F MED LIST DOCD IN RCRD: CPT | Mod: S$GLB,,, | Performed by: OTOLARYNGOLOGY

## 2021-02-03 PROCEDURE — 1101F PT FALLS ASSESS-DOCD LE1/YR: CPT | Mod: CPTII,S$GLB,, | Performed by: OTOLARYNGOLOGY

## 2021-02-03 PROCEDURE — 1101F PR PT FALLS ASSESS DOC 0-1 FALLS W/OUT INJ PAST YR: ICD-10-PCS | Mod: CPTII,S$GLB,, | Performed by: OTOLARYNGOLOGY

## 2021-02-03 PROCEDURE — 92504 PR EAR MICROSCOPY EXAMINATION: ICD-10-PCS | Mod: S$GLB,,, | Performed by: OTOLARYNGOLOGY

## 2021-02-03 PROCEDURE — 99999 PR PBB SHADOW E&M-EST. PATIENT-LVL V: ICD-10-PCS | Mod: PBBFAC,,, | Performed by: OTOLARYNGOLOGY

## 2021-02-03 PROCEDURE — 92553 AUDIOMETRY AIR & BONE: CPT | Mod: S$GLB,,, | Performed by: AUDIOLOGIST-HEARING AID FITTER

## 2021-02-03 RX ORDER — METHYLPREDNISOLONE 4 MG/1
TABLET ORAL
Qty: 1 PACKAGE | Refills: 0 | Status: SHIPPED | OUTPATIENT
Start: 2021-02-03 | End: 2021-02-24

## 2021-02-03 RX ORDER — CIPROFLOXACIN 500 MG/1
500 TABLET ORAL 2 TIMES DAILY
Qty: 28 TABLET | Refills: 0 | Status: SHIPPED | OUTPATIENT
Start: 2021-02-03 | End: 2021-02-17

## 2021-02-08 ENCOUNTER — INFUSION (OUTPATIENT)
Dept: INFECTIOUS DISEASES | Facility: HOSPITAL | Age: 81
End: 2021-02-08
Attending: INTERNAL MEDICINE
Payer: MEDICARE

## 2021-02-08 VITALS
SYSTOLIC BLOOD PRESSURE: 177 MMHG | DIASTOLIC BLOOD PRESSURE: 62 MMHG | RESPIRATION RATE: 18 BRPM | TEMPERATURE: 98 F | BODY MASS INDEX: 27.8 KG/M2 | OXYGEN SATURATION: 97 % | HEIGHT: 63 IN | HEART RATE: 91 BPM | WEIGHT: 156.88 LBS

## 2021-02-08 DIAGNOSIS — D50.8 OTHER IRON DEFICIENCY ANEMIA: Primary | ICD-10-CM

## 2021-02-08 PROCEDURE — 25000003 PHARM REV CODE 250: Performed by: INTERNAL MEDICINE

## 2021-02-08 PROCEDURE — 63600175 PHARM REV CODE 636 W HCPCS: Performed by: INTERNAL MEDICINE

## 2021-02-08 PROCEDURE — 96365 THER/PROPH/DIAG IV INF INIT: CPT

## 2021-02-08 RX ORDER — SODIUM CHLORIDE 0.9 % (FLUSH) 0.9 %
10 SYRINGE (ML) INJECTION
Status: DISCONTINUED | OUTPATIENT
Start: 2021-02-08 | End: 2021-02-08 | Stop reason: HOSPADM

## 2021-02-08 RX ORDER — HEPARIN 100 UNIT/ML
500 SYRINGE INTRAVENOUS
Status: CANCELLED | OUTPATIENT
Start: 2021-02-15

## 2021-02-08 RX ORDER — SODIUM CHLORIDE 0.9 % (FLUSH) 0.9 %
10 SYRINGE (ML) INJECTION
Status: CANCELLED | OUTPATIENT
Start: 2021-02-15

## 2021-02-08 RX ORDER — HEPARIN 100 UNIT/ML
500 SYRINGE INTRAVENOUS
Status: DISCONTINUED | OUTPATIENT
Start: 2021-02-08 | End: 2021-02-08 | Stop reason: HOSPADM

## 2021-02-08 RX ADMIN — IRON SUCROSE 100 MG: 20 INJECTION, SOLUTION INTRAVENOUS at 09:02

## 2021-02-08 RX ADMIN — SODIUM CHLORIDE: 0.9 INJECTION, SOLUTION INTRAVENOUS at 09:02

## 2021-02-09 ENCOUNTER — HOSPITAL ENCOUNTER (OUTPATIENT)
Dept: RADIOLOGY | Facility: HOSPITAL | Age: 81
Discharge: HOME | End: 2021-02-09
Attending: INTERNAL MEDICINE
Payer: MEDICARE

## 2021-02-09 ENCOUNTER — OFFICE VISIT (OUTPATIENT)
Dept: INTERNAL MEDICINE | Facility: CLINIC | Age: 81
End: 2021-02-09
Payer: MEDICARE

## 2021-02-09 VITALS
BODY MASS INDEX: 28.08 KG/M2 | HEIGHT: 63 IN | OXYGEN SATURATION: 96 % | SYSTOLIC BLOOD PRESSURE: 138 MMHG | HEART RATE: 91 BPM | WEIGHT: 158.5 LBS | DIASTOLIC BLOOD PRESSURE: 60 MMHG

## 2021-02-09 DIAGNOSIS — R68.89 FULLNESS IN HEAD: ICD-10-CM

## 2021-02-09 DIAGNOSIS — D32.9 MENINGIOMA: ICD-10-CM

## 2021-02-09 DIAGNOSIS — J44.9 CHRONIC OBSTRUCTIVE PULMONARY DISEASE, UNSPECIFIED COPD TYPE: ICD-10-CM

## 2021-02-09 DIAGNOSIS — T45.1X5A CHEMOTHERAPY-INDUCED NEUROPATHY: ICD-10-CM

## 2021-02-09 DIAGNOSIS — G62.0 CHEMOTHERAPY-INDUCED NEUROPATHY: ICD-10-CM

## 2021-02-09 DIAGNOSIS — E27.8 ADRENAL CORTICAL NODULE: ICD-10-CM

## 2021-02-09 DIAGNOSIS — Z85.828 HISTORY OF BASAL CELL CANCER: ICD-10-CM

## 2021-02-09 DIAGNOSIS — I42.8 NONISCHEMIC CARDIOMYOPATHY: ICD-10-CM

## 2021-02-09 DIAGNOSIS — I70.0 CALCIFICATION OF AORTA: ICD-10-CM

## 2021-02-09 DIAGNOSIS — Z86.16 HISTORY OF 2019 NOVEL CORONAVIRUS DISEASE (COVID-19): Primary | ICD-10-CM

## 2021-02-09 DIAGNOSIS — N18.4 CHRONIC KIDNEY DISEASE, STAGE 4 (SEVERE): ICD-10-CM

## 2021-02-09 PROCEDURE — 99499 RISK ADDL DX/OHS AUDIT: ICD-10-PCS | Mod: S$GLB,,, | Performed by: INTERNAL MEDICINE

## 2021-02-09 PROCEDURE — 70450 CT HEAD WITHOUT CONTRAST: ICD-10-PCS | Mod: 26,,, | Performed by: RADIOLOGY

## 2021-02-09 PROCEDURE — 3288F PR FALLS RISK ASSESSMENT DOCUMENTED: ICD-10-PCS | Mod: CPTII,S$GLB,, | Performed by: INTERNAL MEDICINE

## 2021-02-09 PROCEDURE — 99999 PR PBB SHADOW E&M-EST. PATIENT-LVL V: ICD-10-PCS | Mod: PBBFAC,,, | Performed by: INTERNAL MEDICINE

## 2021-02-09 PROCEDURE — 3078F PR MOST RECENT DIASTOLIC BLOOD PRESSURE < 80 MM HG: ICD-10-PCS | Mod: CPTII,S$GLB,, | Performed by: INTERNAL MEDICINE

## 2021-02-09 PROCEDURE — 1159F PR MEDICATION LIST DOCUMENTED IN MEDICAL RECORD: ICD-10-PCS | Mod: S$GLB,,, | Performed by: INTERNAL MEDICINE

## 2021-02-09 PROCEDURE — 1101F PR PT FALLS ASSESS DOC 0-1 FALLS W/OUT INJ PAST YR: ICD-10-PCS | Mod: CPTII,S$GLB,, | Performed by: INTERNAL MEDICINE

## 2021-02-09 PROCEDURE — 3075F PR MOST RECENT SYSTOLIC BLOOD PRESS GE 130-139MM HG: ICD-10-PCS | Mod: CPTII,S$GLB,, | Performed by: INTERNAL MEDICINE

## 2021-02-09 PROCEDURE — 70450 CT HEAD/BRAIN W/O DYE: CPT | Mod: 26,,, | Performed by: RADIOLOGY

## 2021-02-09 PROCEDURE — 3078F DIAST BP <80 MM HG: CPT | Mod: CPTII,S$GLB,, | Performed by: INTERNAL MEDICINE

## 2021-02-09 PROCEDURE — 3075F SYST BP GE 130 - 139MM HG: CPT | Mod: CPTII,S$GLB,, | Performed by: INTERNAL MEDICINE

## 2021-02-09 PROCEDURE — 99499 UNLISTED E&M SERVICE: CPT | Mod: S$GLB,,, | Performed by: INTERNAL MEDICINE

## 2021-02-09 PROCEDURE — 99214 OFFICE O/P EST MOD 30 MIN: CPT | Mod: S$GLB,,, | Performed by: INTERNAL MEDICINE

## 2021-02-09 PROCEDURE — 99214 PR OFFICE/OUTPT VISIT, EST, LEVL IV, 30-39 MIN: ICD-10-PCS | Mod: S$GLB,,, | Performed by: INTERNAL MEDICINE

## 2021-02-09 PROCEDURE — 70450 CT HEAD/BRAIN W/O DYE: CPT | Mod: TC

## 2021-02-09 PROCEDURE — 1101F PT FALLS ASSESS-DOCD LE1/YR: CPT | Mod: CPTII,S$GLB,, | Performed by: INTERNAL MEDICINE

## 2021-02-09 PROCEDURE — 3288F FALL RISK ASSESSMENT DOCD: CPT | Mod: CPTII,S$GLB,, | Performed by: INTERNAL MEDICINE

## 2021-02-09 PROCEDURE — 99999 PR PBB SHADOW E&M-EST. PATIENT-LVL V: CPT | Mod: PBBFAC,,, | Performed by: INTERNAL MEDICINE

## 2021-02-09 PROCEDURE — 1159F MED LIST DOCD IN RCRD: CPT | Mod: S$GLB,,, | Performed by: INTERNAL MEDICINE

## 2021-02-09 NOTE — PROGRESS NOTES
Chart review complete. Patient has resumed monitoring. Homes readings have been elevated. Patient seen in clinic today by Dr. Gaston - /60. Will defer outreach due to recent office visit. Review device charging recommendations at follow-up. Will continue monitoring.    Last 5 Patient Entered Readings                                      Current 30 Day Average: 155/71     Recent Readings 2/5/2021 2/4/2021 1/29/2021 1/27/2021 1/26/2021    SBP (mmHg) 156 164 138 143 159    DBP (mmHg) 67 71 61 75 75    Pulse 76 75 73 67 67

## 2021-02-10 ENCOUNTER — TELEPHONE (OUTPATIENT)
Dept: INTERNAL MEDICINE | Facility: CLINIC | Age: 81
End: 2021-02-10

## 2021-02-12 ENCOUNTER — CLINICAL SUPPORT (OUTPATIENT)
Dept: CARDIOLOGY | Facility: HOSPITAL | Age: 81
End: 2021-02-12
Payer: MEDICARE

## 2021-02-12 DIAGNOSIS — Z95.810 PRESENCE OF AUTOMATIC (IMPLANTABLE) CARDIAC DEFIBRILLATOR: ICD-10-CM

## 2021-02-12 DIAGNOSIS — I42.9 CARDIOMYOPATHY, UNSPECIFIED: ICD-10-CM

## 2021-02-12 DIAGNOSIS — I50.42 CHRONIC COMBINED SYSTOLIC (CONGESTIVE) AND DIASTOLIC (CONGESTIVE) HEART FAILURE: ICD-10-CM

## 2021-02-12 PROCEDURE — 93295 DEV INTERROG REMOTE 1/2/MLT: CPT | Mod: ,,, | Performed by: INTERNAL MEDICINE

## 2021-02-12 PROCEDURE — 93296 REM INTERROG EVL PM/IDS: CPT | Performed by: INTERNAL MEDICINE

## 2021-02-12 PROCEDURE — 93295 CARDIAC DEVICE CHECK - REMOTE: ICD-10-PCS | Mod: ,,, | Performed by: INTERNAL MEDICINE

## 2021-02-14 ENCOUNTER — PATIENT MESSAGE (OUTPATIENT)
Dept: OTOLARYNGOLOGY | Facility: CLINIC | Age: 81
End: 2021-02-14

## 2021-02-18 ENCOUNTER — PATIENT MESSAGE (OUTPATIENT)
Dept: OTOLARYNGOLOGY | Facility: CLINIC | Age: 81
End: 2021-02-18

## 2021-02-22 ENCOUNTER — INFUSION (OUTPATIENT)
Dept: INFECTIOUS DISEASES | Facility: HOSPITAL | Age: 81
End: 2021-02-22
Attending: INTERNAL MEDICINE
Payer: MEDICARE

## 2021-02-22 VITALS
DIASTOLIC BLOOD PRESSURE: 72 MMHG | HEART RATE: 103 BPM | TEMPERATURE: 99 F | BODY MASS INDEX: 29.08 KG/M2 | OXYGEN SATURATION: 96 % | WEIGHT: 164.13 LBS | HEIGHT: 63 IN | RESPIRATION RATE: 18 BRPM | SYSTOLIC BLOOD PRESSURE: 178 MMHG

## 2021-02-22 DIAGNOSIS — D50.8 OTHER IRON DEFICIENCY ANEMIA: Primary | ICD-10-CM

## 2021-02-22 PROCEDURE — 96365 THER/PROPH/DIAG IV INF INIT: CPT

## 2021-02-22 PROCEDURE — 25000003 PHARM REV CODE 250: Performed by: INTERNAL MEDICINE

## 2021-02-22 PROCEDURE — 63600175 PHARM REV CODE 636 W HCPCS: Performed by: INTERNAL MEDICINE

## 2021-02-22 RX ORDER — SODIUM CHLORIDE 0.9 % (FLUSH) 0.9 %
10 SYRINGE (ML) INJECTION
Status: DISCONTINUED | OUTPATIENT
Start: 2021-02-22 | End: 2021-02-22 | Stop reason: HOSPADM

## 2021-02-22 RX ORDER — HEPARIN 100 UNIT/ML
500 SYRINGE INTRAVENOUS
Status: DISCONTINUED | OUTPATIENT
Start: 2021-02-22 | End: 2021-02-22 | Stop reason: HOSPADM

## 2021-02-22 RX ORDER — HEPARIN 100 UNIT/ML
500 SYRINGE INTRAVENOUS
Status: CANCELLED | OUTPATIENT
Start: 2021-03-01

## 2021-02-22 RX ORDER — SODIUM CHLORIDE 0.9 % (FLUSH) 0.9 %
10 SYRINGE (ML) INJECTION
Status: CANCELLED | OUTPATIENT
Start: 2021-03-01

## 2021-02-22 RX ADMIN — SODIUM CHLORIDE: 0.9 INJECTION, SOLUTION INTRAVENOUS at 09:02

## 2021-02-22 RX ADMIN — IRON SUCROSE 100 MG: 20 INJECTION, SOLUTION INTRAVENOUS at 09:02

## 2021-02-24 ENCOUNTER — OFFICE VISIT (OUTPATIENT)
Dept: OTOLARYNGOLOGY | Facility: CLINIC | Age: 81
End: 2021-02-24
Payer: MEDICARE

## 2021-02-24 ENCOUNTER — CLINICAL SUPPORT (OUTPATIENT)
Dept: OTOLARYNGOLOGY | Facility: CLINIC | Age: 81
End: 2021-02-24
Payer: MEDICARE

## 2021-02-24 VITALS
HEIGHT: 63 IN | BODY MASS INDEX: 28.42 KG/M2 | DIASTOLIC BLOOD PRESSURE: 82 MMHG | WEIGHT: 160.38 LBS | HEART RATE: 90 BPM | SYSTOLIC BLOOD PRESSURE: 172 MMHG

## 2021-02-24 DIAGNOSIS — H65.22 LEFT CHRONIC SEROUS OTITIS MEDIA: Chronic | ICD-10-CM

## 2021-02-24 DIAGNOSIS — H90.A21 SENSORINEURAL HEARING LOSS (SNHL) OF RIGHT EAR WITH RESTRICTED HEARING OF LEFT EAR: ICD-10-CM

## 2021-02-24 DIAGNOSIS — J30.89 NON-SEASONAL ALLERGIC RHINITIS, UNSPECIFIED TRIGGER: Chronic | ICD-10-CM

## 2021-02-24 DIAGNOSIS — H90.A32 MIXED CONDUCTIVE AND SENSORINEURAL HEARING LOSS OF LEFT EAR WITH RESTRICTED HEARING OF RIGHT EAR: Primary | ICD-10-CM

## 2021-02-24 DIAGNOSIS — H90.A32 MIXED CONDUCTIVE AND SENSORINEURAL HEARING LOSS OF LEFT EAR WITH RESTRICTED HEARING OF RIGHT EAR: ICD-10-CM

## 2021-02-24 DIAGNOSIS — H83.8X2 SUPERIOR SEMICIRCULAR CANAL DEHISCENCE OF LEFT EAR: Chronic | ICD-10-CM

## 2021-02-24 DIAGNOSIS — J32.8 OTHER CHRONIC SINUSITIS: Primary | Chronic | ICD-10-CM

## 2021-02-24 PROCEDURE — 99214 OFFICE O/P EST MOD 30 MIN: CPT | Mod: S$GLB,,, | Performed by: OTOLARYNGOLOGY

## 2021-02-24 PROCEDURE — 99999 PR PBB SHADOW E&M-EST. PATIENT-LVL V: CPT | Mod: PBBFAC,,, | Performed by: OTOLARYNGOLOGY

## 2021-02-24 PROCEDURE — 3079F DIAST BP 80-89 MM HG: CPT | Mod: CPTII,S$GLB,, | Performed by: OTOLARYNGOLOGY

## 2021-02-24 PROCEDURE — 1101F PR PT FALLS ASSESS DOC 0-1 FALLS W/OUT INJ PAST YR: ICD-10-PCS | Mod: CPTII,S$GLB,, | Performed by: OTOLARYNGOLOGY

## 2021-02-24 PROCEDURE — 92552 PURE TONE AUDIOMETRY AIR: CPT | Mod: 52,S$GLB,, | Performed by: AUDIOLOGIST-HEARING AID FITTER

## 2021-02-24 PROCEDURE — 99999 PR PBB SHADOW E&M-EST. PATIENT-LVL V: ICD-10-PCS | Mod: PBBFAC,,, | Performed by: OTOLARYNGOLOGY

## 2021-02-24 PROCEDURE — 1159F PR MEDICATION LIST DOCUMENTED IN MEDICAL RECORD: ICD-10-PCS | Mod: S$GLB,,, | Performed by: OTOLARYNGOLOGY

## 2021-02-24 PROCEDURE — 92567 TYMPANOMETRY: CPT | Mod: S$GLB,,, | Performed by: AUDIOLOGIST-HEARING AID FITTER

## 2021-02-24 PROCEDURE — 92552 PR PURE TONE AUDIOMETRY, AIR: ICD-10-PCS | Mod: 52,S$GLB,, | Performed by: AUDIOLOGIST-HEARING AID FITTER

## 2021-02-24 PROCEDURE — 3288F FALL RISK ASSESSMENT DOCD: CPT | Mod: CPTII,S$GLB,, | Performed by: OTOLARYNGOLOGY

## 2021-02-24 PROCEDURE — 99214 PR OFFICE/OUTPT VISIT, EST, LEVL IV, 30-39 MIN: ICD-10-PCS | Mod: S$GLB,,, | Performed by: OTOLARYNGOLOGY

## 2021-02-24 PROCEDURE — 1126F PR PAIN SEVERITY QUANTIFIED, NO PAIN PRESENT: ICD-10-PCS | Mod: S$GLB,,, | Performed by: OTOLARYNGOLOGY

## 2021-02-24 PROCEDURE — 92567 PR TYMPA2METRY: ICD-10-PCS | Mod: S$GLB,,, | Performed by: AUDIOLOGIST-HEARING AID FITTER

## 2021-02-24 PROCEDURE — 3079F PR MOST RECENT DIASTOLIC BLOOD PRESSURE 80-89 MM HG: ICD-10-PCS | Mod: CPTII,S$GLB,, | Performed by: OTOLARYNGOLOGY

## 2021-02-24 PROCEDURE — 1126F AMNT PAIN NOTED NONE PRSNT: CPT | Mod: S$GLB,,, | Performed by: OTOLARYNGOLOGY

## 2021-02-24 PROCEDURE — 3288F PR FALLS RISK ASSESSMENT DOCUMENTED: ICD-10-PCS | Mod: CPTII,S$GLB,, | Performed by: OTOLARYNGOLOGY

## 2021-02-24 PROCEDURE — 3077F PR MOST RECENT SYSTOLIC BLOOD PRESSURE >= 140 MM HG: ICD-10-PCS | Mod: CPTII,S$GLB,, | Performed by: OTOLARYNGOLOGY

## 2021-02-24 PROCEDURE — 3077F SYST BP >= 140 MM HG: CPT | Mod: CPTII,S$GLB,, | Performed by: OTOLARYNGOLOGY

## 2021-02-24 PROCEDURE — 1159F MED LIST DOCD IN RCRD: CPT | Mod: S$GLB,,, | Performed by: OTOLARYNGOLOGY

## 2021-02-24 PROCEDURE — 1101F PT FALLS ASSESS-DOCD LE1/YR: CPT | Mod: CPTII,S$GLB,, | Performed by: OTOLARYNGOLOGY

## 2021-02-24 RX ORDER — PREDNISONE 20 MG/1
TABLET ORAL
Qty: 21 TABLET | Refills: 0 | Status: SHIPPED | OUTPATIENT
Start: 2021-02-24 | End: 2021-03-09

## 2021-02-24 RX ORDER — LEVOCETIRIZINE DIHYDROCHLORIDE 5 MG/1
5 TABLET, FILM COATED ORAL NIGHTLY
Qty: 30 TABLET | Refills: 11 | Status: SHIPPED | OUTPATIENT
Start: 2021-02-24 | End: 2021-03-23

## 2021-02-24 RX ORDER — AMOXICILLIN AND CLAVULANATE POTASSIUM 875; 125 MG/1; MG/1
1 TABLET, FILM COATED ORAL 2 TIMES DAILY
Qty: 28 TABLET | Refills: 0 | Status: SHIPPED | OUTPATIENT
Start: 2021-02-24 | End: 2021-03-10

## 2021-02-24 RX ORDER — FLUTICASONE PROPIONATE 50 MCG
2 SPRAY, SUSPENSION (ML) NASAL DAILY
Qty: 16 G | Refills: 12 | Status: SHIPPED | OUTPATIENT
Start: 2021-02-24 | End: 2023-02-27

## 2021-02-25 ENCOUNTER — OFFICE VISIT (OUTPATIENT)
Dept: NEPHROLOGY | Facility: CLINIC | Age: 81
End: 2021-02-25
Payer: MEDICARE

## 2021-02-25 VITALS
WEIGHT: 161.19 LBS | HEART RATE: 97 BPM | BODY MASS INDEX: 28.56 KG/M2 | HEIGHT: 63 IN | SYSTOLIC BLOOD PRESSURE: 140 MMHG | OXYGEN SATURATION: 96 % | DIASTOLIC BLOOD PRESSURE: 62 MMHG

## 2021-02-25 DIAGNOSIS — N18.32 STAGE 3B CHRONIC KIDNEY DISEASE: Primary | ICD-10-CM

## 2021-02-25 PROCEDURE — 3078F DIAST BP <80 MM HG: CPT | Mod: CPTII,S$GLB,, | Performed by: INTERNAL MEDICINE

## 2021-02-25 PROCEDURE — 1126F PR PAIN SEVERITY QUANTIFIED, NO PAIN PRESENT: ICD-10-PCS | Mod: S$GLB,,, | Performed by: INTERNAL MEDICINE

## 2021-02-25 PROCEDURE — 99213 PR OFFICE/OUTPT VISIT, EST, LEVL III, 20-29 MIN: ICD-10-PCS | Mod: S$GLB,,, | Performed by: INTERNAL MEDICINE

## 2021-02-25 PROCEDURE — 99499 RISK ADDL DX/OHS AUDIT: ICD-10-PCS | Mod: S$GLB,,, | Performed by: INTERNAL MEDICINE

## 2021-02-25 PROCEDURE — 3078F PR MOST RECENT DIASTOLIC BLOOD PRESSURE < 80 MM HG: ICD-10-PCS | Mod: CPTII,S$GLB,, | Performed by: INTERNAL MEDICINE

## 2021-02-25 PROCEDURE — 3288F PR FALLS RISK ASSESSMENT DOCUMENTED: ICD-10-PCS | Mod: CPTII,S$GLB,, | Performed by: INTERNAL MEDICINE

## 2021-02-25 PROCEDURE — 1159F PR MEDICATION LIST DOCUMENTED IN MEDICAL RECORD: ICD-10-PCS | Mod: S$GLB,,, | Performed by: INTERNAL MEDICINE

## 2021-02-25 PROCEDURE — 3288F FALL RISK ASSESSMENT DOCD: CPT | Mod: CPTII,S$GLB,, | Performed by: INTERNAL MEDICINE

## 2021-02-25 PROCEDURE — 99999 PR PBB SHADOW E&M-EST. PATIENT-LVL IV: ICD-10-PCS | Mod: PBBFAC,,, | Performed by: INTERNAL MEDICINE

## 2021-02-25 PROCEDURE — 99213 OFFICE O/P EST LOW 20 MIN: CPT | Mod: S$GLB,,, | Performed by: INTERNAL MEDICINE

## 2021-02-25 PROCEDURE — 1101F PR PT FALLS ASSESS DOC 0-1 FALLS W/OUT INJ PAST YR: ICD-10-PCS | Mod: CPTII,S$GLB,, | Performed by: INTERNAL MEDICINE

## 2021-02-25 PROCEDURE — 99999 PR PBB SHADOW E&M-EST. PATIENT-LVL IV: CPT | Mod: PBBFAC,,, | Performed by: INTERNAL MEDICINE

## 2021-02-25 PROCEDURE — 1159F MED LIST DOCD IN RCRD: CPT | Mod: S$GLB,,, | Performed by: INTERNAL MEDICINE

## 2021-02-25 PROCEDURE — 1126F AMNT PAIN NOTED NONE PRSNT: CPT | Mod: S$GLB,,, | Performed by: INTERNAL MEDICINE

## 2021-02-25 PROCEDURE — 3077F SYST BP >= 140 MM HG: CPT | Mod: CPTII,S$GLB,, | Performed by: INTERNAL MEDICINE

## 2021-02-25 PROCEDURE — 1101F PT FALLS ASSESS-DOCD LE1/YR: CPT | Mod: CPTII,S$GLB,, | Performed by: INTERNAL MEDICINE

## 2021-02-25 PROCEDURE — 3077F PR MOST RECENT SYSTOLIC BLOOD PRESSURE >= 140 MM HG: ICD-10-PCS | Mod: CPTII,S$GLB,, | Performed by: INTERNAL MEDICINE

## 2021-02-25 PROCEDURE — 99499 UNLISTED E&M SERVICE: CPT | Mod: S$GLB,,, | Performed by: INTERNAL MEDICINE

## 2021-03-01 ENCOUNTER — TELEPHONE (OUTPATIENT)
Dept: ELECTROPHYSIOLOGY | Facility: CLINIC | Age: 81
End: 2021-03-01

## 2021-03-01 ENCOUNTER — INFUSION (OUTPATIENT)
Dept: INFECTIOUS DISEASES | Facility: HOSPITAL | Age: 81
End: 2021-03-01
Attending: INTERNAL MEDICINE
Payer: MEDICARE

## 2021-03-01 VITALS
OXYGEN SATURATION: 94 % | SYSTOLIC BLOOD PRESSURE: 132 MMHG | BODY MASS INDEX: 29.31 KG/M2 | HEART RATE: 81 BPM | WEIGHT: 165.44 LBS | DIASTOLIC BLOOD PRESSURE: 59 MMHG | TEMPERATURE: 98 F | HEIGHT: 63 IN | RESPIRATION RATE: 17 BRPM

## 2021-03-01 DIAGNOSIS — D50.8 OTHER IRON DEFICIENCY ANEMIA: Primary | ICD-10-CM

## 2021-03-01 PROCEDURE — 96365 THER/PROPH/DIAG IV INF INIT: CPT

## 2021-03-01 PROCEDURE — 63600175 PHARM REV CODE 636 W HCPCS: Performed by: INTERNAL MEDICINE

## 2021-03-01 PROCEDURE — 25000003 PHARM REV CODE 250: Performed by: INTERNAL MEDICINE

## 2021-03-01 RX ORDER — SODIUM CHLORIDE 0.9 % (FLUSH) 0.9 %
10 SYRINGE (ML) INJECTION
Status: DISCONTINUED | OUTPATIENT
Start: 2021-03-01 | End: 2021-03-01 | Stop reason: HOSPADM

## 2021-03-01 RX ORDER — HEPARIN 100 UNIT/ML
500 SYRINGE INTRAVENOUS
Status: CANCELLED | OUTPATIENT
Start: 2021-03-08

## 2021-03-01 RX ORDER — SODIUM CHLORIDE 0.9 % (FLUSH) 0.9 %
10 SYRINGE (ML) INJECTION
Status: CANCELLED | OUTPATIENT
Start: 2021-03-08

## 2021-03-01 RX ADMIN — SODIUM CHLORIDE: 9 INJECTION, SOLUTION INTRAVENOUS at 10:03

## 2021-03-01 RX ADMIN — IRON SUCROSE 100 MG: 20 INJECTION, SOLUTION INTRAVENOUS at 10:03

## 2021-03-02 ENCOUNTER — PATIENT MESSAGE (OUTPATIENT)
Dept: OTOLARYNGOLOGY | Facility: CLINIC | Age: 81
End: 2021-03-02

## 2021-03-02 ENCOUNTER — PATIENT MESSAGE (OUTPATIENT)
Dept: INTERNAL MEDICINE | Facility: CLINIC | Age: 81
End: 2021-03-02

## 2021-03-03 ENCOUNTER — OFFICE VISIT (OUTPATIENT)
Dept: ELECTROPHYSIOLOGY | Facility: CLINIC | Age: 81
End: 2021-03-03
Payer: MEDICARE

## 2021-03-03 ENCOUNTER — CLINICAL SUPPORT (OUTPATIENT)
Dept: CARDIOLOGY | Facility: HOSPITAL | Age: 81
End: 2021-03-03
Attending: INTERNAL MEDICINE
Payer: MEDICARE

## 2021-03-03 ENCOUNTER — HOSPITAL ENCOUNTER (OUTPATIENT)
Dept: CARDIOLOGY | Facility: CLINIC | Age: 81
Discharge: HOME | End: 2021-03-03
Payer: MEDICARE

## 2021-03-03 VITALS
HEART RATE: 74 BPM | WEIGHT: 165.56 LBS | DIASTOLIC BLOOD PRESSURE: 56 MMHG | SYSTOLIC BLOOD PRESSURE: 124 MMHG | HEIGHT: 63 IN | BODY MASS INDEX: 29.34 KG/M2

## 2021-03-03 DIAGNOSIS — G47.33 OSA (OBSTRUCTIVE SLEEP APNEA): ICD-10-CM

## 2021-03-03 DIAGNOSIS — I44.7 LBBB (LEFT BUNDLE BRANCH BLOCK): ICD-10-CM

## 2021-03-03 DIAGNOSIS — Z85.3 HISTORY OF BREAST CANCER: ICD-10-CM

## 2021-03-03 DIAGNOSIS — Z95.810 BIVENTRICULAR ICD (IMPLANTABLE CARDIOVERTER-DEFIBRILLATOR) IN PLACE: ICD-10-CM

## 2021-03-03 DIAGNOSIS — I15.2 HYPERTENSION ASSOCIATED WITH DIABETES: ICD-10-CM

## 2021-03-03 DIAGNOSIS — N18.4 CKD (CHRONIC KIDNEY DISEASE) STAGE 4, GFR 15-29 ML/MIN: ICD-10-CM

## 2021-03-03 DIAGNOSIS — I50.22 CHRONIC SYSTOLIC HEART FAILURE: ICD-10-CM

## 2021-03-03 DIAGNOSIS — I42.8 NONISCHEMIC CARDIOMYOPATHY: ICD-10-CM

## 2021-03-03 DIAGNOSIS — J44.9 CHRONIC OBSTRUCTIVE PULMONARY DISEASE, UNSPECIFIED COPD TYPE: ICD-10-CM

## 2021-03-03 DIAGNOSIS — I44.7 LBBB (LEFT BUNDLE BRANCH BLOCK): Primary | ICD-10-CM

## 2021-03-03 DIAGNOSIS — E11.59 HYPERTENSION ASSOCIATED WITH DIABETES: ICD-10-CM

## 2021-03-03 DIAGNOSIS — E11.65 UNCONTROLLED TYPE 2 DIABETES MELLITUS WITH HYPERGLYCEMIA: ICD-10-CM

## 2021-03-03 DIAGNOSIS — I25.10 CORONARY ARTERY DISEASE INVOLVING NATIVE CORONARY ARTERY OF NATIVE HEART WITHOUT ANGINA PECTORIS: ICD-10-CM

## 2021-03-03 PROCEDURE — 3074F SYST BP LT 130 MM HG: CPT | Mod: CPTII,S$GLB,, | Performed by: INTERNAL MEDICINE

## 2021-03-03 PROCEDURE — 3078F DIAST BP <80 MM HG: CPT | Mod: CPTII,S$GLB,, | Performed by: INTERNAL MEDICINE

## 2021-03-03 PROCEDURE — 99499 UNLISTED E&M SERVICE: CPT | Mod: S$GLB,,, | Performed by: INTERNAL MEDICINE

## 2021-03-03 PROCEDURE — 1126F AMNT PAIN NOTED NONE PRSNT: CPT | Mod: S$GLB,,, | Performed by: INTERNAL MEDICINE

## 2021-03-03 PROCEDURE — 93010 RHYTHM STRIP: ICD-10-PCS | Mod: S$GLB,,, | Performed by: INTERNAL MEDICINE

## 2021-03-03 PROCEDURE — 1159F PR MEDICATION LIST DOCUMENTED IN MEDICAL RECORD: ICD-10-PCS | Mod: S$GLB,,, | Performed by: INTERNAL MEDICINE

## 2021-03-03 PROCEDURE — 99999 PR PBB SHADOW E&M-EST. PATIENT-LVL IV: ICD-10-PCS | Mod: PBBFAC,,, | Performed by: INTERNAL MEDICINE

## 2021-03-03 PROCEDURE — 3074F PR MOST RECENT SYSTOLIC BLOOD PRESSURE < 130 MM HG: ICD-10-PCS | Mod: CPTII,S$GLB,, | Performed by: INTERNAL MEDICINE

## 2021-03-03 PROCEDURE — 93284 PRGRMG EVAL IMPLANTABLE DFB: CPT

## 2021-03-03 PROCEDURE — 1101F PR PT FALLS ASSESS DOC 0-1 FALLS W/OUT INJ PAST YR: ICD-10-PCS | Mod: CPTII,S$GLB,, | Performed by: INTERNAL MEDICINE

## 2021-03-03 PROCEDURE — 93005 ELECTROCARDIOGRAM TRACING: CPT | Mod: S$GLB,,, | Performed by: INTERNAL MEDICINE

## 2021-03-03 PROCEDURE — 1101F PT FALLS ASSESS-DOCD LE1/YR: CPT | Mod: CPTII,S$GLB,, | Performed by: INTERNAL MEDICINE

## 2021-03-03 PROCEDURE — 99215 PR OFFICE/OUTPT VISIT, EST, LEVL V, 40-54 MIN: ICD-10-PCS | Mod: S$GLB,,, | Performed by: INTERNAL MEDICINE

## 2021-03-03 PROCEDURE — 93010 ELECTROCARDIOGRAM REPORT: CPT | Mod: S$GLB,,, | Performed by: INTERNAL MEDICINE

## 2021-03-03 PROCEDURE — 93284 CARDIAC DEVICE CHECK - IN CLINIC & HOSPITAL: ICD-10-PCS | Mod: 26,,, | Performed by: INTERNAL MEDICINE

## 2021-03-03 PROCEDURE — 3078F PR MOST RECENT DIASTOLIC BLOOD PRESSURE < 80 MM HG: ICD-10-PCS | Mod: CPTII,S$GLB,, | Performed by: INTERNAL MEDICINE

## 2021-03-03 PROCEDURE — 1159F MED LIST DOCD IN RCRD: CPT | Mod: S$GLB,,, | Performed by: INTERNAL MEDICINE

## 2021-03-03 PROCEDURE — 3288F FALL RISK ASSESSMENT DOCD: CPT | Mod: CPTII,S$GLB,, | Performed by: INTERNAL MEDICINE

## 2021-03-03 PROCEDURE — 99999 PR PBB SHADOW E&M-EST. PATIENT-LVL IV: CPT | Mod: PBBFAC,,, | Performed by: INTERNAL MEDICINE

## 2021-03-03 PROCEDURE — 99499 RISK ADDL DX/OHS AUDIT: ICD-10-PCS | Mod: S$GLB,,, | Performed by: INTERNAL MEDICINE

## 2021-03-03 PROCEDURE — 93284 PRGRMG EVAL IMPLANTABLE DFB: CPT | Mod: 26,,, | Performed by: INTERNAL MEDICINE

## 2021-03-03 PROCEDURE — 99215 OFFICE O/P EST HI 40 MIN: CPT | Mod: S$GLB,,, | Performed by: INTERNAL MEDICINE

## 2021-03-03 PROCEDURE — 3288F PR FALLS RISK ASSESSMENT DOCUMENTED: ICD-10-PCS | Mod: CPTII,S$GLB,, | Performed by: INTERNAL MEDICINE

## 2021-03-03 PROCEDURE — 1126F PR PAIN SEVERITY QUANTIFIED, NO PAIN PRESENT: ICD-10-PCS | Mod: S$GLB,,, | Performed by: INTERNAL MEDICINE

## 2021-03-03 PROCEDURE — 93005 RHYTHM STRIP: ICD-10-PCS | Mod: S$GLB,,, | Performed by: INTERNAL MEDICINE

## 2021-03-03 RX ORDER — MONTELUKAST SODIUM 10 MG/1
10 TABLET ORAL NIGHTLY
COMMUNITY
Start: 2021-01-31 | End: 2022-01-25 | Stop reason: SDUPTHER

## 2021-03-05 ENCOUNTER — OFFICE VISIT (OUTPATIENT)
Dept: OTOLARYNGOLOGY | Facility: CLINIC | Age: 81
End: 2021-03-05
Payer: MEDICARE

## 2021-03-05 ENCOUNTER — CLINICAL SUPPORT (OUTPATIENT)
Dept: AUDIOLOGY | Facility: CLINIC | Age: 81
End: 2021-03-05
Payer: MEDICARE

## 2021-03-05 VITALS — WEIGHT: 161.19 LBS | BODY MASS INDEX: 28.55 KG/M2

## 2021-03-05 DIAGNOSIS — H90.A21 SENSORINEURAL HEARING LOSS (SNHL) OF RIGHT EAR WITH RESTRICTED HEARING OF LEFT EAR: ICD-10-CM

## 2021-03-05 DIAGNOSIS — H93.12 TINNITUS OF LEFT EAR: ICD-10-CM

## 2021-03-05 DIAGNOSIS — H93.12 TINNITUS, LEFT EAR: Primary | ICD-10-CM

## 2021-03-05 DIAGNOSIS — H73.899 DECREASED MOBILITY OF TYMPANIC MEMBRANE: ICD-10-CM

## 2021-03-05 DIAGNOSIS — H90.A32 MIXED CONDUCTIVE AND SENSORINEURAL HEARING LOSS OF LEFT EAR WITH RESTRICTED HEARING OF RIGHT EAR: ICD-10-CM

## 2021-03-05 DIAGNOSIS — H90.6 MIXED CONDUCTIVE AND SENSORINEURAL HEARING LOSS OF BOTH EARS: Primary | ICD-10-CM

## 2021-03-05 PROCEDURE — 92557 COMPREHENSIVE HEARING TEST: CPT | Mod: S$GLB,,, | Performed by: AUDIOLOGIST

## 2021-03-05 PROCEDURE — 92567 PR TYMPA2METRY: ICD-10-PCS | Mod: S$GLB,,, | Performed by: AUDIOLOGIST

## 2021-03-05 PROCEDURE — 1126F PR PAIN SEVERITY QUANTIFIED, NO PAIN PRESENT: ICD-10-PCS | Mod: S$GLB,,, | Performed by: OTOLARYNGOLOGY

## 2021-03-05 PROCEDURE — 1159F MED LIST DOCD IN RCRD: CPT | Mod: S$GLB,,, | Performed by: OTOLARYNGOLOGY

## 2021-03-05 PROCEDURE — 99214 OFFICE O/P EST MOD 30 MIN: CPT | Mod: S$GLB,,, | Performed by: OTOLARYNGOLOGY

## 2021-03-05 PROCEDURE — 1126F AMNT PAIN NOTED NONE PRSNT: CPT | Mod: S$GLB,,, | Performed by: OTOLARYNGOLOGY

## 2021-03-05 PROCEDURE — 92567 TYMPANOMETRY: CPT | Mod: S$GLB,,, | Performed by: AUDIOLOGIST

## 2021-03-05 PROCEDURE — 92557 PR COMPREHENSIVE HEARING TEST: ICD-10-PCS | Mod: S$GLB,,, | Performed by: AUDIOLOGIST

## 2021-03-05 PROCEDURE — 1159F PR MEDICATION LIST DOCUMENTED IN MEDICAL RECORD: ICD-10-PCS | Mod: S$GLB,,, | Performed by: OTOLARYNGOLOGY

## 2021-03-05 PROCEDURE — 99214 PR OFFICE/OUTPT VISIT, EST, LEVL IV, 30-39 MIN: ICD-10-PCS | Mod: S$GLB,,, | Performed by: OTOLARYNGOLOGY

## 2021-03-05 PROCEDURE — 99999 PR PBB SHADOW E&M-EST. PATIENT-LVL IV: CPT | Mod: PBBFAC,,, | Performed by: OTOLARYNGOLOGY

## 2021-03-05 PROCEDURE — 99999 PR PBB SHADOW E&M-EST. PATIENT-LVL IV: ICD-10-PCS | Mod: PBBFAC,,, | Performed by: OTOLARYNGOLOGY

## 2021-03-08 ENCOUNTER — INFUSION (OUTPATIENT)
Dept: INFECTIOUS DISEASES | Facility: HOSPITAL | Age: 81
End: 2021-03-08
Attending: INTERNAL MEDICINE
Payer: MEDICARE

## 2021-03-08 VITALS
TEMPERATURE: 97 F | BODY MASS INDEX: 29.44 KG/M2 | DIASTOLIC BLOOD PRESSURE: 74 MMHG | SYSTOLIC BLOOD PRESSURE: 146 MMHG | OXYGEN SATURATION: 97 % | HEART RATE: 67 BPM | RESPIRATION RATE: 16 BRPM | HEIGHT: 63 IN | WEIGHT: 166.13 LBS

## 2021-03-08 DIAGNOSIS — D50.8 OTHER IRON DEFICIENCY ANEMIA: Primary | ICD-10-CM

## 2021-03-08 PROCEDURE — 25000003 PHARM REV CODE 250: Performed by: INTERNAL MEDICINE

## 2021-03-08 PROCEDURE — 96365 THER/PROPH/DIAG IV INF INIT: CPT

## 2021-03-08 PROCEDURE — 63600175 PHARM REV CODE 636 W HCPCS: Performed by: INTERNAL MEDICINE

## 2021-03-08 RX ORDER — SODIUM CHLORIDE 0.9 % (FLUSH) 0.9 %
10 SYRINGE (ML) INJECTION
Status: DISCONTINUED | OUTPATIENT
Start: 2021-03-08 | End: 2021-03-08 | Stop reason: HOSPADM

## 2021-03-08 RX ORDER — SODIUM CHLORIDE 0.9 % (FLUSH) 0.9 %
10 SYRINGE (ML) INJECTION
Status: CANCELLED | OUTPATIENT
Start: 2021-03-15

## 2021-03-08 RX ORDER — HEPARIN 100 UNIT/ML
500 SYRINGE INTRAVENOUS
Status: CANCELLED | OUTPATIENT
Start: 2021-03-15

## 2021-03-08 RX ADMIN — IRON SUCROSE 100 MG: 20 INJECTION, SOLUTION INTRAVENOUS at 09:03

## 2021-03-09 ENCOUNTER — OFFICE VISIT (OUTPATIENT)
Dept: DERMATOLOGY | Facility: CLINIC | Age: 81
End: 2021-03-09
Payer: MEDICARE

## 2021-03-09 DIAGNOSIS — Z85.828 HISTORY OF NONMELANOMA SKIN CANCER: ICD-10-CM

## 2021-03-09 DIAGNOSIS — L57.8 CHRONIC SOLAR DERMATITIS: ICD-10-CM

## 2021-03-09 DIAGNOSIS — L57.0 AK (ACTINIC KERATOSIS): Primary | ICD-10-CM

## 2021-03-09 DIAGNOSIS — L81.4 LENTIGO: ICD-10-CM

## 2021-03-09 DIAGNOSIS — L82.1 SK (SEBORRHEIC KERATOSIS): ICD-10-CM

## 2021-03-09 PROCEDURE — 17000 DESTRUCT PREMALG LESION: CPT | Mod: S$GLB,,, | Performed by: DERMATOLOGY

## 2021-03-09 PROCEDURE — 17000 PR DESTRUCTION(LASER SURGERY,CRYOSURGERY,CHEMOSURGERY),PREMALIGNANT LESIONS,FIRST LESION: ICD-10-PCS | Mod: S$GLB,,, | Performed by: DERMATOLOGY

## 2021-03-09 PROCEDURE — 1159F PR MEDICATION LIST DOCUMENTED IN MEDICAL RECORD: ICD-10-PCS | Mod: S$GLB,,, | Performed by: DERMATOLOGY

## 2021-03-09 PROCEDURE — 1101F PT FALLS ASSESS-DOCD LE1/YR: CPT | Mod: CPTII,S$GLB,, | Performed by: DERMATOLOGY

## 2021-03-09 PROCEDURE — 1101F PR PT FALLS ASSESS DOC 0-1 FALLS W/OUT INJ PAST YR: ICD-10-PCS | Mod: CPTII,S$GLB,, | Performed by: DERMATOLOGY

## 2021-03-09 PROCEDURE — 99999 PR PBB SHADOW E&M-EST. PATIENT-LVL IV: ICD-10-PCS | Mod: PBBFAC,,, | Performed by: DERMATOLOGY

## 2021-03-09 PROCEDURE — 17003 DESTRUCTION, PREMALIGNANT LESIONS; SECOND THROUGH 14 LESIONS: ICD-10-PCS | Mod: S$GLB,,, | Performed by: DERMATOLOGY

## 2021-03-09 PROCEDURE — 3288F FALL RISK ASSESSMENT DOCD: CPT | Mod: CPTII,S$GLB,, | Performed by: DERMATOLOGY

## 2021-03-09 PROCEDURE — 99999 PR PBB SHADOW E&M-EST. PATIENT-LVL IV: CPT | Mod: PBBFAC,,, | Performed by: DERMATOLOGY

## 2021-03-09 PROCEDURE — 99213 PR OFFICE/OUTPT VISIT, EST, LEVL III, 20-29 MIN: ICD-10-PCS | Mod: 25,S$GLB,, | Performed by: DERMATOLOGY

## 2021-03-09 PROCEDURE — 3288F PR FALLS RISK ASSESSMENT DOCUMENTED: ICD-10-PCS | Mod: CPTII,S$GLB,, | Performed by: DERMATOLOGY

## 2021-03-09 PROCEDURE — 1159F MED LIST DOCD IN RCRD: CPT | Mod: S$GLB,,, | Performed by: DERMATOLOGY

## 2021-03-09 PROCEDURE — 99213 OFFICE O/P EST LOW 20 MIN: CPT | Mod: 25,S$GLB,, | Performed by: DERMATOLOGY

## 2021-03-09 PROCEDURE — 1126F PR PAIN SEVERITY QUANTIFIED, NO PAIN PRESENT: ICD-10-PCS | Mod: S$GLB,,, | Performed by: DERMATOLOGY

## 2021-03-09 PROCEDURE — 17003 DESTRUCT PREMALG LES 2-14: CPT | Mod: S$GLB,,, | Performed by: DERMATOLOGY

## 2021-03-09 PROCEDURE — 1126F AMNT PAIN NOTED NONE PRSNT: CPT | Mod: S$GLB,,, | Performed by: DERMATOLOGY

## 2021-03-14 NOTE — PLAN OF CARE
04/09/18 1445   Final Note   Assessment Type Final Discharge Note     Patient discharged home with no needs 4/9/18.   15-Mar-2021 07:44

## 2021-03-15 ENCOUNTER — OFFICE VISIT (OUTPATIENT)
Dept: ENDOCRINOLOGY | Facility: CLINIC | Age: 81
End: 2021-03-15
Payer: MEDICARE

## 2021-03-15 ENCOUNTER — INFUSION (OUTPATIENT)
Dept: INFECTIOUS DISEASES | Facility: HOSPITAL | Age: 81
End: 2021-03-15
Attending: INTERNAL MEDICINE
Payer: MEDICARE

## 2021-03-15 VITALS
HEIGHT: 63 IN | DIASTOLIC BLOOD PRESSURE: 67 MMHG | DIASTOLIC BLOOD PRESSURE: 68 MMHG | BODY MASS INDEX: 29.21 KG/M2 | WEIGHT: 164.88 LBS | HEART RATE: 100 BPM | SYSTOLIC BLOOD PRESSURE: 156 MMHG | HEART RATE: 77 BPM | BODY MASS INDEX: 28.26 KG/M2 | HEIGHT: 63 IN | RESPIRATION RATE: 18 BRPM | TEMPERATURE: 98 F | SYSTOLIC BLOOD PRESSURE: 140 MMHG | WEIGHT: 159.5 LBS | OXYGEN SATURATION: 96 %

## 2021-03-15 DIAGNOSIS — I25.10 CORONARY ARTERY DISEASE INVOLVING NATIVE CORONARY ARTERY OF NATIVE HEART WITHOUT ANGINA PECTORIS: ICD-10-CM

## 2021-03-15 DIAGNOSIS — E11.69 HYPERLIPIDEMIA ASSOCIATED WITH TYPE 2 DIABETES MELLITUS: ICD-10-CM

## 2021-03-15 DIAGNOSIS — Z79.4 CONTROLLED TYPE 2 DIABETES MELLITUS WITH BOTH EYES AFFECTED BY MILD NONPROLIFERATIVE RETINOPATHY AND MACULAR EDEMA, WITH LONG-TERM CURRENT USE OF INSULIN: ICD-10-CM

## 2021-03-15 DIAGNOSIS — E78.5 HYPERLIPIDEMIA ASSOCIATED WITH TYPE 2 DIABETES MELLITUS: ICD-10-CM

## 2021-03-15 DIAGNOSIS — E11.65 UNCONTROLLED TYPE 2 DIABETES MELLITUS WITH HYPERGLYCEMIA: Primary | ICD-10-CM

## 2021-03-15 DIAGNOSIS — E11.59 HYPERTENSION ASSOCIATED WITH DIABETES: ICD-10-CM

## 2021-03-15 DIAGNOSIS — N18.4 CKD (CHRONIC KIDNEY DISEASE) STAGE 4, GFR 15-29 ML/MIN: ICD-10-CM

## 2021-03-15 DIAGNOSIS — I15.2 HYPERTENSION ASSOCIATED WITH DIABETES: ICD-10-CM

## 2021-03-15 DIAGNOSIS — D50.8 OTHER IRON DEFICIENCY ANEMIA: Primary | ICD-10-CM

## 2021-03-15 DIAGNOSIS — E11.42 DIABETIC POLYNEUROPATHY ASSOCIATED WITH TYPE 2 DIABETES MELLITUS: ICD-10-CM

## 2021-03-15 DIAGNOSIS — M81.0 OSTEOPOROSIS, UNSPECIFIED OSTEOPOROSIS TYPE, UNSPECIFIED PATHOLOGICAL FRACTURE PRESENCE: ICD-10-CM

## 2021-03-15 DIAGNOSIS — E04.2 NONTOXIC MULTINODULAR GOITER: ICD-10-CM

## 2021-03-15 DIAGNOSIS — E27.8 ADRENAL CORTICAL NODULE: ICD-10-CM

## 2021-03-15 DIAGNOSIS — E11.3213 CONTROLLED TYPE 2 DIABETES MELLITUS WITH BOTH EYES AFFECTED BY MILD NONPROLIFERATIVE RETINOPATHY AND MACULAR EDEMA, WITH LONG-TERM CURRENT USE OF INSULIN: ICD-10-CM

## 2021-03-15 PROCEDURE — 1126F AMNT PAIN NOTED NONE PRSNT: CPT | Mod: S$GLB,,, | Performed by: NURSE PRACTITIONER

## 2021-03-15 PROCEDURE — 3077F SYST BP >= 140 MM HG: CPT | Mod: CPTII,S$GLB,, | Performed by: NURSE PRACTITIONER

## 2021-03-15 PROCEDURE — 1126F PR PAIN SEVERITY QUANTIFIED, NO PAIN PRESENT: ICD-10-PCS | Mod: S$GLB,,, | Performed by: NURSE PRACTITIONER

## 2021-03-15 PROCEDURE — 3078F PR MOST RECENT DIASTOLIC BLOOD PRESSURE < 80 MM HG: ICD-10-PCS | Mod: CPTII,S$GLB,, | Performed by: NURSE PRACTITIONER

## 2021-03-15 PROCEDURE — 1159F PR MEDICATION LIST DOCUMENTED IN MEDICAL RECORD: ICD-10-PCS | Mod: S$GLB,,, | Performed by: NURSE PRACTITIONER

## 2021-03-15 PROCEDURE — 99999 PR PBB SHADOW E&M-EST. PATIENT-LVL V: ICD-10-PCS | Mod: PBBFAC,,, | Performed by: NURSE PRACTITIONER

## 2021-03-15 PROCEDURE — 99499 UNLISTED E&M SERVICE: CPT | Mod: S$GLB,,, | Performed by: NURSE PRACTITIONER

## 2021-03-15 PROCEDURE — 3077F PR MOST RECENT SYSTOLIC BLOOD PRESSURE >= 140 MM HG: ICD-10-PCS | Mod: CPTII,S$GLB,, | Performed by: NURSE PRACTITIONER

## 2021-03-15 PROCEDURE — 25000003 PHARM REV CODE 250: Performed by: INTERNAL MEDICINE

## 2021-03-15 PROCEDURE — 1159F MED LIST DOCD IN RCRD: CPT | Mod: S$GLB,,, | Performed by: NURSE PRACTITIONER

## 2021-03-15 PROCEDURE — 99499 RISK ADDL DX/OHS AUDIT: ICD-10-PCS | Mod: S$GLB,,, | Performed by: NURSE PRACTITIONER

## 2021-03-15 PROCEDURE — 63600175 PHARM REV CODE 636 W HCPCS: Performed by: INTERNAL MEDICINE

## 2021-03-15 PROCEDURE — 99214 OFFICE O/P EST MOD 30 MIN: CPT | Mod: S$GLB,,, | Performed by: NURSE PRACTITIONER

## 2021-03-15 PROCEDURE — 96365 THER/PROPH/DIAG IV INF INIT: CPT

## 2021-03-15 PROCEDURE — 3078F DIAST BP <80 MM HG: CPT | Mod: CPTII,S$GLB,, | Performed by: NURSE PRACTITIONER

## 2021-03-15 PROCEDURE — 99999 PR PBB SHADOW E&M-EST. PATIENT-LVL V: CPT | Mod: PBBFAC,,, | Performed by: NURSE PRACTITIONER

## 2021-03-15 PROCEDURE — 99214 PR OFFICE/OUTPT VISIT, EST, LEVL IV, 30-39 MIN: ICD-10-PCS | Mod: S$GLB,,, | Performed by: NURSE PRACTITIONER

## 2021-03-15 RX ORDER — METFORMIN HYDROCHLORIDE 500 MG/1
500 TABLET ORAL 2 TIMES DAILY WITH MEALS
Qty: 180 TABLET | Refills: 6 | Status: SHIPPED | OUTPATIENT
Start: 2021-03-15 | End: 2021-03-30

## 2021-03-15 RX ORDER — LINAGLIPTIN 5 MG/1
5 TABLET, FILM COATED ORAL DAILY
Qty: 90 TABLET | Refills: 3 | Status: SHIPPED | OUTPATIENT
Start: 2021-03-15 | End: 2021-08-06

## 2021-03-15 RX ORDER — HEPARIN 100 UNIT/ML
500 SYRINGE INTRAVENOUS
Status: CANCELLED | OUTPATIENT
Start: 2021-03-22

## 2021-03-15 RX ORDER — HEPARIN 100 UNIT/ML
500 SYRINGE INTRAVENOUS
Status: DISCONTINUED | OUTPATIENT
Start: 2021-03-15 | End: 2021-03-15 | Stop reason: HOSPADM

## 2021-03-15 RX ORDER — SODIUM CHLORIDE 0.9 % (FLUSH) 0.9 %
10 SYRINGE (ML) INJECTION
Status: CANCELLED | OUTPATIENT
Start: 2021-03-22

## 2021-03-15 RX ORDER — SODIUM CHLORIDE 0.9 % (FLUSH) 0.9 %
10 SYRINGE (ML) INJECTION
Status: DISCONTINUED | OUTPATIENT
Start: 2021-03-15 | End: 2021-03-15 | Stop reason: HOSPADM

## 2021-03-15 RX ORDER — INSULIN GLARGINE 100 [IU]/ML
28 INJECTION, SOLUTION SUBCUTANEOUS NIGHTLY
Qty: 2 BOX | Refills: 6 | Status: SHIPPED | OUTPATIENT
Start: 2021-03-15 | End: 2021-10-01

## 2021-03-15 RX ADMIN — SODIUM CHLORIDE: 0.9 INJECTION, SOLUTION INTRAVENOUS at 09:03

## 2021-03-15 RX ADMIN — IRON SUCROSE 100 MG: 20 INJECTION, SOLUTION INTRAVENOUS at 10:03

## 2021-03-17 ENCOUNTER — OFFICE VISIT (OUTPATIENT)
Dept: OTOLARYNGOLOGY | Facility: CLINIC | Age: 81
End: 2021-03-17
Payer: MEDICARE

## 2021-03-17 VITALS
SYSTOLIC BLOOD PRESSURE: 167 MMHG | HEIGHT: 63 IN | DIASTOLIC BLOOD PRESSURE: 69 MMHG | BODY MASS INDEX: 28.62 KG/M2 | WEIGHT: 161.5 LBS | HEART RATE: 78 BPM

## 2021-03-17 DIAGNOSIS — H93.12 TINNITUS, LEFT EAR: ICD-10-CM

## 2021-03-17 DIAGNOSIS — H90.A32 MIXED CONDUCTIVE AND SENSORINEURAL HEARING LOSS OF LEFT EAR WITH RESTRICTED HEARING OF RIGHT EAR: Chronic | ICD-10-CM

## 2021-03-17 DIAGNOSIS — H65.22 LEFT CHRONIC SEROUS OTITIS MEDIA: Chronic | ICD-10-CM

## 2021-03-17 DIAGNOSIS — J32.8 OTHER CHRONIC SINUSITIS: Primary | Chronic | ICD-10-CM

## 2021-03-17 PROCEDURE — 3288F PR FALLS RISK ASSESSMENT DOCUMENTED: ICD-10-PCS | Mod: CPTII,S$GLB,, | Performed by: OTOLARYNGOLOGY

## 2021-03-17 PROCEDURE — 3288F FALL RISK ASSESSMENT DOCD: CPT | Mod: CPTII,S$GLB,, | Performed by: OTOLARYNGOLOGY

## 2021-03-17 PROCEDURE — 99999 PR PBB SHADOW E&M-EST. PATIENT-LVL IV: ICD-10-PCS | Mod: PBBFAC,,, | Performed by: OTOLARYNGOLOGY

## 2021-03-17 PROCEDURE — 3078F PR MOST RECENT DIASTOLIC BLOOD PRESSURE < 80 MM HG: ICD-10-PCS | Mod: CPTII,S$GLB,, | Performed by: OTOLARYNGOLOGY

## 2021-03-17 PROCEDURE — 3077F PR MOST RECENT SYSTOLIC BLOOD PRESSURE >= 140 MM HG: ICD-10-PCS | Mod: CPTII,S$GLB,, | Performed by: OTOLARYNGOLOGY

## 2021-03-17 PROCEDURE — 1126F PR PAIN SEVERITY QUANTIFIED, NO PAIN PRESENT: ICD-10-PCS | Mod: S$GLB,,, | Performed by: OTOLARYNGOLOGY

## 2021-03-17 PROCEDURE — 1101F PT FALLS ASSESS-DOCD LE1/YR: CPT | Mod: CPTII,S$GLB,, | Performed by: OTOLARYNGOLOGY

## 2021-03-17 PROCEDURE — 3078F DIAST BP <80 MM HG: CPT | Mod: CPTII,S$GLB,, | Performed by: OTOLARYNGOLOGY

## 2021-03-17 PROCEDURE — 1101F PR PT FALLS ASSESS DOC 0-1 FALLS W/OUT INJ PAST YR: ICD-10-PCS | Mod: CPTII,S$GLB,, | Performed by: OTOLARYNGOLOGY

## 2021-03-17 PROCEDURE — 99214 OFFICE O/P EST MOD 30 MIN: CPT | Mod: S$GLB,,, | Performed by: OTOLARYNGOLOGY

## 2021-03-17 PROCEDURE — 99214 PR OFFICE/OUTPT VISIT, EST, LEVL IV, 30-39 MIN: ICD-10-PCS | Mod: S$GLB,,, | Performed by: OTOLARYNGOLOGY

## 2021-03-17 PROCEDURE — 1126F AMNT PAIN NOTED NONE PRSNT: CPT | Mod: S$GLB,,, | Performed by: OTOLARYNGOLOGY

## 2021-03-17 PROCEDURE — 99999 PR PBB SHADOW E&M-EST. PATIENT-LVL IV: CPT | Mod: PBBFAC,,, | Performed by: OTOLARYNGOLOGY

## 2021-03-17 PROCEDURE — 1159F MED LIST DOCD IN RCRD: CPT | Mod: S$GLB,,, | Performed by: OTOLARYNGOLOGY

## 2021-03-17 PROCEDURE — 3077F SYST BP >= 140 MM HG: CPT | Mod: CPTII,S$GLB,, | Performed by: OTOLARYNGOLOGY

## 2021-03-17 PROCEDURE — 1159F PR MEDICATION LIST DOCUMENTED IN MEDICAL RECORD: ICD-10-PCS | Mod: S$GLB,,, | Performed by: OTOLARYNGOLOGY

## 2021-03-22 ENCOUNTER — INFUSION (OUTPATIENT)
Dept: INFECTIOUS DISEASES | Facility: HOSPITAL | Age: 81
End: 2021-03-22
Attending: INTERNAL MEDICINE
Payer: MEDICARE

## 2021-03-22 VITALS
RESPIRATION RATE: 18 BRPM | HEIGHT: 63 IN | WEIGHT: 167.88 LBS | TEMPERATURE: 98 F | SYSTOLIC BLOOD PRESSURE: 178 MMHG | HEART RATE: 96 BPM | BODY MASS INDEX: 29.75 KG/M2 | DIASTOLIC BLOOD PRESSURE: 74 MMHG | OXYGEN SATURATION: 98 %

## 2021-03-22 DIAGNOSIS — D50.8 OTHER IRON DEFICIENCY ANEMIA: Primary | ICD-10-CM

## 2021-03-22 PROCEDURE — 93010 EKG 12-LEAD: ICD-10-PCS | Mod: ,,, | Performed by: INTERNAL MEDICINE

## 2021-03-22 PROCEDURE — 25000003 PHARM REV CODE 250: Performed by: INTERNAL MEDICINE

## 2021-03-22 PROCEDURE — 93005 ELECTROCARDIOGRAM TRACING: CPT

## 2021-03-22 PROCEDURE — 93010 ELECTROCARDIOGRAM REPORT: CPT | Mod: ,,, | Performed by: INTERNAL MEDICINE

## 2021-03-22 PROCEDURE — 63600175 PHARM REV CODE 636 W HCPCS: Performed by: INTERNAL MEDICINE

## 2021-03-22 PROCEDURE — 99285 EMERGENCY DEPT VISIT HI MDM: CPT | Mod: CS,,, | Performed by: EMERGENCY MEDICINE

## 2021-03-22 PROCEDURE — 99285 PR EMERGENCY DEPT VISIT,LEVEL V: ICD-10-PCS | Mod: CS,,, | Performed by: EMERGENCY MEDICINE

## 2021-03-22 PROCEDURE — 99285 EMERGENCY DEPT VISIT HI MDM: CPT | Mod: 25

## 2021-03-22 RX ORDER — SODIUM CHLORIDE 0.9 % (FLUSH) 0.9 %
10 SYRINGE (ML) INJECTION
Status: CANCELLED | OUTPATIENT
Start: 2021-03-29

## 2021-03-22 RX ORDER — HEPARIN 100 UNIT/ML
500 SYRINGE INTRAVENOUS
Status: CANCELLED | OUTPATIENT
Start: 2021-03-29

## 2021-03-22 RX ORDER — SODIUM CHLORIDE 0.9 % (FLUSH) 0.9 %
10 SYRINGE (ML) INJECTION
Status: DISCONTINUED | OUTPATIENT
Start: 2021-03-22 | End: 2021-03-22 | Stop reason: HOSPADM

## 2021-03-22 RX ORDER — HEPARIN 100 UNIT/ML
500 SYRINGE INTRAVENOUS
Status: DISCONTINUED | OUTPATIENT
Start: 2021-03-22 | End: 2021-03-22 | Stop reason: HOSPADM

## 2021-03-22 RX ADMIN — IRON SUCROSE 100 MG: 20 INJECTION, SOLUTION INTRAVENOUS at 10:03

## 2021-03-22 RX ADMIN — SODIUM CHLORIDE: 0.9 INJECTION, SOLUTION INTRAVENOUS at 09:03

## 2021-03-23 ENCOUNTER — HOSPITAL ENCOUNTER (INPATIENT)
Facility: HOSPITAL | Age: 81
LOS: 2 days | Discharge: HOME | DRG: 193 | End: 2021-03-25
Attending: EMERGENCY MEDICINE | Admitting: HOSPITALIST
Payer: MEDICARE

## 2021-03-23 DIAGNOSIS — R09.02 HYPOXIA: Primary | ICD-10-CM

## 2021-03-23 DIAGNOSIS — Z79.4 CONTROLLED TYPE 2 DIABETES MELLITUS WITH BOTH EYES AFFECTED BY MILD NONPROLIFERATIVE RETINOPATHY AND MACULAR EDEMA, WITH LONG-TERM CURRENT USE OF INSULIN: ICD-10-CM

## 2021-03-23 DIAGNOSIS — E66.3 OVERWEIGHT (BMI 25.0-29.9): ICD-10-CM

## 2021-03-23 DIAGNOSIS — E78.5 HYPERLIPIDEMIA ASSOCIATED WITH TYPE 2 DIABETES MELLITUS: ICD-10-CM

## 2021-03-23 DIAGNOSIS — N18.4 CKD (CHRONIC KIDNEY DISEASE) STAGE 4, GFR 15-29 ML/MIN: ICD-10-CM

## 2021-03-23 DIAGNOSIS — E11.69 HYPERLIPIDEMIA ASSOCIATED WITH TYPE 2 DIABETES MELLITUS: ICD-10-CM

## 2021-03-23 DIAGNOSIS — J18.9 PNEUMONIA OF BOTH LOWER LOBES DUE TO INFECTIOUS ORGANISM: ICD-10-CM

## 2021-03-23 DIAGNOSIS — I15.2 HYPERTENSION ASSOCIATED WITH DIABETES: ICD-10-CM

## 2021-03-23 DIAGNOSIS — R06.02 SOB (SHORTNESS OF BREATH): ICD-10-CM

## 2021-03-23 DIAGNOSIS — J18.9 PNEUMONIA: ICD-10-CM

## 2021-03-23 DIAGNOSIS — I50.43 ACUTE ON CHRONIC COMBINED SYSTOLIC AND DIASTOLIC HEART FAILURE: ICD-10-CM

## 2021-03-23 DIAGNOSIS — I50.9 CHF (CONGESTIVE HEART FAILURE): ICD-10-CM

## 2021-03-23 DIAGNOSIS — E11.3213 CONTROLLED TYPE 2 DIABETES MELLITUS WITH BOTH EYES AFFECTED BY MILD NONPROLIFERATIVE RETINOPATHY AND MACULAR EDEMA, WITH LONG-TERM CURRENT USE OF INSULIN: ICD-10-CM

## 2021-03-23 DIAGNOSIS — J96.01 ACUTE HYPOXEMIC RESPIRATORY FAILURE: ICD-10-CM

## 2021-03-23 DIAGNOSIS — J44.9 CHRONIC OBSTRUCTIVE PULMONARY DISEASE, UNSPECIFIED COPD TYPE: ICD-10-CM

## 2021-03-23 DIAGNOSIS — I25.10 CORONARY ARTERY DISEASE INVOLVING NATIVE CORONARY ARTERY OF NATIVE HEART WITHOUT ANGINA PECTORIS: ICD-10-CM

## 2021-03-23 DIAGNOSIS — I50.9 ACUTE ON CHRONIC CONGESTIVE HEART FAILURE: ICD-10-CM

## 2021-03-23 DIAGNOSIS — E11.65 UNCONTROLLED TYPE 2 DIABETES MELLITUS WITH HYPERGLYCEMIA: ICD-10-CM

## 2021-03-23 DIAGNOSIS — R06.02 SHORTNESS OF BREATH: ICD-10-CM

## 2021-03-23 DIAGNOSIS — D50.9 IRON DEFICIENCY ANEMIA, UNSPECIFIED IRON DEFICIENCY ANEMIA TYPE: ICD-10-CM

## 2021-03-23 DIAGNOSIS — E11.59 HYPERTENSION ASSOCIATED WITH DIABETES: ICD-10-CM

## 2021-03-23 PROBLEM — G47.33 OSA (OBSTRUCTIVE SLEEP APNEA): Status: RESOLVED | Noted: 2017-01-26 | Resolved: 2021-03-23

## 2021-03-23 PROBLEM — I16.1 HYPERTENSIVE EMERGENCY: Status: ACTIVE | Noted: 2021-03-23

## 2021-03-23 LAB
ALBUMIN SERPL BCP-MCNC: 2.7 G/DL (ref 3.5–5.2)
ALBUMIN SERPL BCP-MCNC: 2.7 G/DL (ref 3.5–5.2)
ALP SERPL-CCNC: 78 U/L (ref 55–135)
ALP SERPL-CCNC: 83 U/L (ref 55–135)
ALT SERPL W/O P-5'-P-CCNC: 13 U/L (ref 10–44)
ALT SERPL W/O P-5'-P-CCNC: 13 U/L (ref 10–44)
ANION GAP SERPL CALC-SCNC: 11 MMOL/L (ref 8–16)
ANION GAP SERPL CALC-SCNC: 12 MMOL/L (ref 8–16)
ANION GAP SERPL CALC-SCNC: 12 MMOL/L (ref 8–16)
ANION GAP SERPL CALC-SCNC: 9 MMOL/L (ref 8–16)
ASCENDING AORTA: 3.85 CM
AST SERPL-CCNC: 14 U/L (ref 10–40)
AST SERPL-CCNC: 17 U/L (ref 10–40)
AV INDEX (PROSTH): 0.53
AV MEAN GRADIENT: 5 MMHG
AV PEAK GRADIENT: 9 MMHG
AV VALVE AREA: 1.6 CM2
AV VELOCITY RATIO: 0.65
BACTERIA #/AREA URNS AUTO: NORMAL /HPF
BASOPHILS # BLD AUTO: 0.04 K/UL (ref 0–0.2)
BASOPHILS # BLD AUTO: 0.06 K/UL (ref 0–0.2)
BASOPHILS NFR BLD: 0.3 % (ref 0–1.9)
BASOPHILS NFR BLD: 0.6 % (ref 0–1.9)
BILIRUB SERPL-MCNC: 0.3 MG/DL (ref 0.1–1)
BILIRUB SERPL-MCNC: 0.4 MG/DL (ref 0.1–1)
BILIRUB UR QL STRIP: NEGATIVE
BNP SERPL-MCNC: 395 PG/ML (ref 0–99)
BUN SERPL-MCNC: 31 MG/DL (ref 8–23)
CALCIUM SERPL-MCNC: 8.7 MG/DL (ref 8.7–10.5)
CALCIUM SERPL-MCNC: 9.1 MG/DL (ref 8.7–10.5)
CHLORIDE SERPL-SCNC: 102 MMOL/L (ref 95–110)
CHLORIDE SERPL-SCNC: 103 MMOL/L (ref 95–110)
CHLORIDE SERPL-SCNC: 103 MMOL/L (ref 95–110)
CHLORIDE SERPL-SCNC: 104 MMOL/L (ref 95–110)
CLARITY UR REFRACT.AUTO: CLEAR
CO2 SERPL-SCNC: 20 MMOL/L (ref 23–29)
CO2 SERPL-SCNC: 23 MMOL/L (ref 23–29)
COLOR UR AUTO: YELLOW
CREAT SERPL-MCNC: 1.6 MG/DL (ref 0.5–1.4)
CREAT UR-MCNC: 38 MG/DL (ref 15–325)
CTP QC/QA: YES
CV ECHO LV RWT: 0.31 CM
DIFFERENTIAL METHOD: ABNORMAL
DIFFERENTIAL METHOD: ABNORMAL
DOP CALC AO PEAK VEL: 1.5 M/S
DOP CALC AO VTI: 29.11 CM
DOP CALC LVOT AREA: 3 CM2
DOP CALC LVOT DIAMETER: 1.96 CM
DOP CALC LVOT PEAK VEL: 0.97 M/S
DOP CALC LVOT STROKE VOLUME: 46.5 CM3
DOP CALCLVOT PEAK VEL VTI: 15.42 CM
ECHO LV POSTERIOR WALL: 0.77 CM (ref 0.6–1.1)
EOSINOPHIL # BLD AUTO: 0 K/UL (ref 0–0.5)
EOSINOPHIL # BLD AUTO: 0 K/UL (ref 0–0.5)
EOSINOPHIL NFR BLD: 0.1 % (ref 0–8)
EOSINOPHIL NFR BLD: 0.2 % (ref 0–8)
ERYTHROCYTE [DISTWIDTH] IN BLOOD BY AUTOMATED COUNT: 13.4 % (ref 11.5–14.5)
ERYTHROCYTE [DISTWIDTH] IN BLOOD BY AUTOMATED COUNT: 13.5 % (ref 11.5–14.5)
EST. GFR  (AFRICAN AMERICAN): 34.6 ML/MIN/1.73 M^2
EST. GFR  (NON AFRICAN AMERICAN): 30 ML/MIN/1.73 M^2
FRACTIONAL SHORTENING: 31 % (ref 28–44)
GLUCOSE SERPL-MCNC: 132 MG/DL (ref 70–110)
GLUCOSE SERPL-MCNC: 132 MG/DL (ref 70–110)
GLUCOSE SERPL-MCNC: 167 MG/DL (ref 70–110)
GLUCOSE SERPL-MCNC: 187 MG/DL (ref 70–110)
GLUCOSE UR QL STRIP: ABNORMAL
HCT VFR BLD AUTO: 27 % (ref 37–48.5)
HCT VFR BLD AUTO: 28.1 % (ref 37–48.5)
HGB BLD-MCNC: 9 G/DL (ref 12–16)
HGB BLD-MCNC: 9.1 G/DL (ref 12–16)
HGB UR QL STRIP: NEGATIVE
HYALINE CASTS UR QL AUTO: 0 /LPF
IMM GRANULOCYTES # BLD AUTO: 0.1 K/UL (ref 0–0.04)
IMM GRANULOCYTES # BLD AUTO: 0.11 K/UL (ref 0–0.04)
IMM GRANULOCYTES NFR BLD AUTO: 0.8 % (ref 0–0.5)
IMM GRANULOCYTES NFR BLD AUTO: 1.1 % (ref 0–0.5)
INTERVENTRICULAR SEPTUM: 0.89 CM (ref 0.6–1.1)
KETONES UR QL STRIP: NEGATIVE
LA MAJOR: 5.62 CM
LA MINOR: 4.78 CM
LA WIDTH: 4.04 CM
LEFT ATRIUM SIZE: 4.51 CM
LEFT ATRIUM VOLUME: 80.01 CM3
LEFT INTERNAL DIMENSION IN SYSTOLE: 3.49 CM (ref 2.1–4)
LEFT VENTRICLE DIASTOLIC VOLUME: 120.36 ML
LEFT VENTRICLE SYSTOLIC VOLUME: 50.55 ML
LEFT VENTRICULAR INTERNAL DIMENSION IN DIASTOLE: 5.04 CM (ref 3.5–6)
LEFT VENTRICULAR MASS: 144.32 G
LEUKOCYTE ESTERASE UR QL STRIP: NEGATIVE
LYMPHOCYTES # BLD AUTO: 0.8 K/UL (ref 1–4.8)
LYMPHOCYTES # BLD AUTO: 1.1 K/UL (ref 1–4.8)
LYMPHOCYTES NFR BLD: 12 % (ref 18–48)
LYMPHOCYTES NFR BLD: 6.2 % (ref 18–48)
MAGNESIUM SERPL-MCNC: 1.7 MG/DL (ref 1.6–2.6)
MCH RBC QN AUTO: 29.7 PG (ref 27–31)
MCH RBC QN AUTO: 31.1 PG (ref 27–31)
MCHC RBC AUTO-ENTMCNC: 32 G/DL (ref 32–36)
MCHC RBC AUTO-ENTMCNC: 33.7 G/DL (ref 32–36)
MCV RBC AUTO: 92 FL (ref 82–98)
MCV RBC AUTO: 93 FL (ref 82–98)
MICROSCOPIC COMMENT: NORMAL
MONOCYTES # BLD AUTO: 0.9 K/UL (ref 0.3–1)
MONOCYTES # BLD AUTO: 1 K/UL (ref 0.3–1)
MONOCYTES NFR BLD: 7.6 % (ref 4–15)
MONOCYTES NFR BLD: 9.4 % (ref 4–15)
NEUTROPHILS # BLD AUTO: 11 K/UL (ref 1.8–7.7)
NEUTROPHILS # BLD AUTO: 7.2 K/UL (ref 1.8–7.7)
NEUTROPHILS NFR BLD: 76.8 % (ref 38–73)
NEUTROPHILS NFR BLD: 84.9 % (ref 38–73)
NITRITE UR QL STRIP: NEGATIVE
NON-SQ EPI CELLS #/AREA URNS AUTO: <1 /HPF
NRBC BLD-RTO: 0 /100 WBC
NRBC BLD-RTO: 0 /100 WBC
PH UR STRIP: 6 [PH] (ref 5–8)
PHOSPHATE SERPL-MCNC: 3.4 MG/DL (ref 2.7–4.5)
PISA TR MAX VEL: 3.2 M/S
PLATELET # BLD AUTO: 221 K/UL (ref 150–350)
PLATELET # BLD AUTO: 237 K/UL (ref 150–350)
PMV BLD AUTO: 10.5 FL (ref 9.2–12.9)
PMV BLD AUTO: 10.7 FL (ref 9.2–12.9)
POCT GLUCOSE: 110 MG/DL (ref 70–110)
POCT GLUCOSE: 153 MG/DL (ref 70–110)
POCT GLUCOSE: 155 MG/DL (ref 70–110)
POCT GLUCOSE: 156 MG/DL (ref 70–110)
POTASSIUM SERPL-SCNC: 3.4 MMOL/L (ref 3.5–5.1)
POTASSIUM SERPL-SCNC: 4.2 MMOL/L (ref 3.5–5.1)
PROCALCITONIN SERPL IA-MCNC: 0.04 NG/ML
PROT SERPL-MCNC: 6.5 G/DL (ref 6–8.4)
PROT SERPL-MCNC: 6.6 G/DL (ref 6–8.4)
PROT UR QL STRIP: ABNORMAL
PROT UR-MCNC: 225 MG/DL (ref 0–15)
PROT/CREAT UR: 5.92 MG/G{CREAT} (ref 0–0.2)
RA MAJOR: 4.5 CM
RA PRESSURE: 3 MMHG
RA WIDTH: 3.54 CM
RBC # BLD AUTO: 2.93 M/UL (ref 4–5.4)
RBC # BLD AUTO: 3.03 M/UL (ref 4–5.4)
RBC #/AREA URNS AUTO: 0 /HPF (ref 0–4)
RIGHT VENTRICULAR END-DIASTOLIC DIMENSION: 3.14 CM
SARS-COV-2 RDRP RESP QL NAA+PROBE: NEGATIVE
SINUS: 3.08 CM
SODIUM SERPL-SCNC: 133 MMOL/L (ref 136–145)
SODIUM SERPL-SCNC: 135 MMOL/L (ref 136–145)
SODIUM SERPL-SCNC: 135 MMOL/L (ref 136–145)
SODIUM SERPL-SCNC: 136 MMOL/L (ref 136–145)
SP GR UR STRIP: 1.01 (ref 1–1.03)
SQUAMOUS #/AREA URNS AUTO: 0 /HPF
STJ: 3.38 CM
TDI LATERAL: 0.1 M/S
TDI SEPTAL: 0.11 M/S
TDI: 0.11 M/S
TR MAX PG: 41 MMHG
TRICUSPID ANNULAR PLANE SYSTOLIC EXCURSION: 1.88 CM
TROPONIN I SERPL DL<=0.01 NG/ML-MCNC: 0.01 NG/ML (ref 0–0.03)
TV REST PULMONARY ARTERY PRESSURE: 44 MMHG
URN SPEC COLLECT METH UR: ABNORMAL
WBC # BLD AUTO: 12.97 K/UL (ref 3.9–12.7)
WBC # BLD AUTO: 9.32 K/UL (ref 3.9–12.7)
WBC #/AREA URNS AUTO: 1 /HPF (ref 0–5)

## 2021-03-23 PROCEDURE — 84156 ASSAY OF PROTEIN URINE: CPT | Performed by: PHYSICIAN ASSISTANT

## 2021-03-23 PROCEDURE — 84145 PROCALCITONIN (PCT): CPT | Performed by: EMERGENCY MEDICINE

## 2021-03-23 PROCEDURE — 97165 OT EVAL LOW COMPLEX 30 MIN: CPT

## 2021-03-23 PROCEDURE — 80048 BASIC METABOLIC PNL TOTAL CA: CPT | Performed by: STUDENT IN AN ORGANIZED HEALTH CARE EDUCATION/TRAINING PROGRAM

## 2021-03-23 PROCEDURE — 97116 GAIT TRAINING THERAPY: CPT

## 2021-03-23 PROCEDURE — 83880 ASSAY OF NATRIURETIC PEPTIDE: CPT | Performed by: PHYSICIAN ASSISTANT

## 2021-03-23 PROCEDURE — 97530 THERAPEUTIC ACTIVITIES: CPT

## 2021-03-23 PROCEDURE — 80053 COMPREHEN METABOLIC PANEL: CPT | Mod: 91 | Performed by: STUDENT IN AN ORGANIZED HEALTH CARE EDUCATION/TRAINING PROGRAM

## 2021-03-23 PROCEDURE — 63700000 PHARM REV CODE 250 ALT 637 W/O HCPCS: Performed by: EMERGENCY MEDICINE

## 2021-03-23 PROCEDURE — 63600175 PHARM REV CODE 636 W HCPCS: Performed by: STUDENT IN AN ORGANIZED HEALTH CARE EDUCATION/TRAINING PROGRAM

## 2021-03-23 PROCEDURE — 25000242 PHARM REV CODE 250 ALT 637 W/ HCPCS: Performed by: STUDENT IN AN ORGANIZED HEALTH CARE EDUCATION/TRAINING PROGRAM

## 2021-03-23 PROCEDURE — 84100 ASSAY OF PHOSPHORUS: CPT | Performed by: STUDENT IN AN ORGANIZED HEALTH CARE EDUCATION/TRAINING PROGRAM

## 2021-03-23 PROCEDURE — 80053 COMPREHEN METABOLIC PANEL: CPT | Performed by: PHYSICIAN ASSISTANT

## 2021-03-23 PROCEDURE — 97161 PT EVAL LOW COMPLEX 20 MIN: CPT

## 2021-03-23 PROCEDURE — 83735 ASSAY OF MAGNESIUM: CPT | Performed by: STUDENT IN AN ORGANIZED HEALTH CARE EDUCATION/TRAINING PROGRAM

## 2021-03-23 PROCEDURE — 11000001 HC ACUTE MED/SURG PRIVATE ROOM

## 2021-03-23 PROCEDURE — 25000003 PHARM REV CODE 250: Performed by: STUDENT IN AN ORGANIZED HEALTH CARE EDUCATION/TRAINING PROGRAM

## 2021-03-23 PROCEDURE — 93005 ELECTROCARDIOGRAM TRACING: CPT

## 2021-03-23 PROCEDURE — 99223 PR INITIAL HOSPITAL CARE,LEVL III: ICD-10-PCS | Mod: AI,GC,, | Performed by: HOSPITALIST

## 2021-03-23 PROCEDURE — 87205 SMEAR GRAM STAIN: CPT | Performed by: STUDENT IN AN ORGANIZED HEALTH CARE EDUCATION/TRAINING PROGRAM

## 2021-03-23 PROCEDURE — 99223 1ST HOSP IP/OBS HIGH 75: CPT | Mod: AI,GC,, | Performed by: HOSPITALIST

## 2021-03-23 PROCEDURE — 85025 COMPLETE CBC W/AUTO DIFF WBC: CPT | Performed by: PHYSICIAN ASSISTANT

## 2021-03-23 PROCEDURE — 84484 ASSAY OF TROPONIN QUANT: CPT | Performed by: PHYSICIAN ASSISTANT

## 2021-03-23 PROCEDURE — U0002 COVID-19 LAB TEST NON-CDC: HCPCS | Performed by: HOSPITALIST

## 2021-03-23 PROCEDURE — 93010 ELECTROCARDIOGRAM REPORT: CPT | Mod: ,,, | Performed by: INTERNAL MEDICINE

## 2021-03-23 PROCEDURE — 63600175 PHARM REV CODE 636 W HCPCS: Performed by: EMERGENCY MEDICINE

## 2021-03-23 PROCEDURE — 81001 URINALYSIS AUTO W/SCOPE: CPT | Performed by: PHYSICIAN ASSISTANT

## 2021-03-23 PROCEDURE — 85025 COMPLETE CBC W/AUTO DIFF WBC: CPT | Mod: 91 | Performed by: STUDENT IN AN ORGANIZED HEALTH CARE EDUCATION/TRAINING PROGRAM

## 2021-03-23 PROCEDURE — C9399 UNCLASSIFIED DRUGS OR BIOLOG: HCPCS | Performed by: STUDENT IN AN ORGANIZED HEALTH CARE EDUCATION/TRAINING PROGRAM

## 2021-03-23 PROCEDURE — 93010 EKG 12-LEAD: ICD-10-PCS | Mod: ,,, | Performed by: INTERNAL MEDICINE

## 2021-03-23 PROCEDURE — 87070 CULTURE OTHR SPECIMN AEROBIC: CPT | Performed by: STUDENT IN AN ORGANIZED HEALTH CARE EDUCATION/TRAINING PROGRAM

## 2021-03-23 RX ORDER — PRAVASTATIN SODIUM 40 MG/1
40 TABLET ORAL DAILY
Status: DISCONTINUED | OUTPATIENT
Start: 2021-03-23 | End: 2021-03-25 | Stop reason: HOSPADM

## 2021-03-23 RX ORDER — IBUPROFEN 200 MG
16 TABLET ORAL
Status: DISCONTINUED | OUTPATIENT
Start: 2021-03-23 | End: 2021-03-25 | Stop reason: HOSPADM

## 2021-03-23 RX ORDER — POLYETHYLENE GLYCOL 3350 17 G/17G
17 POWDER, FOR SOLUTION ORAL DAILY
Status: DISCONTINUED | OUTPATIENT
Start: 2021-03-23 | End: 2021-03-25 | Stop reason: HOSPADM

## 2021-03-23 RX ORDER — CEFTRIAXONE 1 G/1
1 INJECTION, POWDER, FOR SOLUTION INTRAMUSCULAR; INTRAVENOUS
Status: DISCONTINUED | OUTPATIENT
Start: 2021-03-24 | End: 2021-03-24

## 2021-03-23 RX ORDER — HYDRALAZINE HYDROCHLORIDE 50 MG/1
100 TABLET, FILM COATED ORAL 3 TIMES DAILY
Status: DISCONTINUED | OUTPATIENT
Start: 2021-03-23 | End: 2021-03-25 | Stop reason: HOSPADM

## 2021-03-23 RX ORDER — HYDRALAZINE HYDROCHLORIDE 25 MG/1
100 TABLET, FILM COATED ORAL 3 TIMES DAILY
Status: DISCONTINUED | OUTPATIENT
Start: 2021-03-23 | End: 2021-03-23

## 2021-03-23 RX ORDER — CARVEDILOL 25 MG/1
50 TABLET ORAL 2 TIMES DAILY
Status: DISCONTINUED | OUTPATIENT
Start: 2021-03-23 | End: 2021-03-25 | Stop reason: HOSPADM

## 2021-03-23 RX ORDER — SODIUM CHLORIDE 0.9 % (FLUSH) 0.9 %
10 SYRINGE (ML) INJECTION
Status: DISCONTINUED | OUTPATIENT
Start: 2021-03-23 | End: 2021-03-25 | Stop reason: HOSPADM

## 2021-03-23 RX ORDER — HEPARIN SODIUM 5000 [USP'U]/ML
5000 INJECTION, SOLUTION INTRAVENOUS; SUBCUTANEOUS EVERY 8 HOURS
Status: DISCONTINUED | OUTPATIENT
Start: 2021-03-23 | End: 2021-03-25 | Stop reason: HOSPADM

## 2021-03-23 RX ORDER — CHLORTHALIDONE 25 MG/1
25 TABLET ORAL DAILY
Status: DISCONTINUED | OUTPATIENT
Start: 2021-03-23 | End: 2021-03-25 | Stop reason: HOSPADM

## 2021-03-23 RX ORDER — ALBUTEROL SULFATE 90 UG/1
1 AEROSOL, METERED RESPIRATORY (INHALATION) EVERY 6 HOURS PRN
Status: DISCONTINUED | OUTPATIENT
Start: 2021-03-23 | End: 2021-03-25 | Stop reason: HOSPADM

## 2021-03-23 RX ORDER — AZITHROMYCIN 250 MG/1
500 TABLET, FILM COATED ORAL DAILY
Status: DISCONTINUED | OUTPATIENT
Start: 2021-03-24 | End: 2021-03-24

## 2021-03-23 RX ORDER — ACETAMINOPHEN 500 MG
1000 TABLET ORAL EVERY 8 HOURS PRN
Status: DISCONTINUED | OUTPATIENT
Start: 2021-03-23 | End: 2021-03-25 | Stop reason: HOSPADM

## 2021-03-23 RX ORDER — DILTIAZEM HYDROCHLORIDE 120 MG/1
240 CAPSULE, COATED, EXTENDED RELEASE ORAL DAILY
Status: DISCONTINUED | OUTPATIENT
Start: 2021-03-24 | End: 2021-03-25 | Stop reason: HOSPADM

## 2021-03-23 RX ORDER — CHLORTHALIDONE 25 MG/1
25 TABLET ORAL DAILY
Status: DISCONTINUED | OUTPATIENT
Start: 2021-03-23 | End: 2021-03-23

## 2021-03-23 RX ORDER — DILTIAZEM HYDROCHLORIDE 180 MG/1
180 CAPSULE, COATED, EXTENDED RELEASE ORAL DAILY
Status: DISCONTINUED | OUTPATIENT
Start: 2021-03-23 | End: 2021-03-23

## 2021-03-23 RX ORDER — AZITHROMYCIN 250 MG/1
500 TABLET, FILM COATED ORAL
Status: COMPLETED | OUTPATIENT
Start: 2021-03-23 | End: 2021-03-23

## 2021-03-23 RX ORDER — INSULIN ASPART 100 [IU]/ML
5 INJECTION, SOLUTION INTRAVENOUS; SUBCUTANEOUS
Status: DISCONTINUED | OUTPATIENT
Start: 2021-03-23 | End: 2021-03-24

## 2021-03-23 RX ORDER — CEFTRIAXONE 1 G/1
1 INJECTION, POWDER, FOR SOLUTION INTRAMUSCULAR; INTRAVENOUS
Status: COMPLETED | OUTPATIENT
Start: 2021-03-23 | End: 2021-03-23

## 2021-03-23 RX ORDER — FUROSEMIDE 10 MG/ML
40 INJECTION INTRAMUSCULAR; INTRAVENOUS
Status: DISCONTINUED | OUTPATIENT
Start: 2021-03-23 | End: 2021-03-23

## 2021-03-23 RX ORDER — LOSARTAN POTASSIUM 50 MG/1
100 TABLET ORAL DAILY
Status: DISCONTINUED | OUTPATIENT
Start: 2021-03-23 | End: 2021-03-25 | Stop reason: HOSPADM

## 2021-03-23 RX ORDER — FUROSEMIDE 10 MG/ML
40 INJECTION INTRAMUSCULAR; INTRAVENOUS
Status: COMPLETED | OUTPATIENT
Start: 2021-03-23 | End: 2021-03-23

## 2021-03-23 RX ORDER — ONDANSETRON 4 MG/1
8 TABLET, ORALLY DISINTEGRATING ORAL EVERY 8 HOURS PRN
Status: DISCONTINUED | OUTPATIENT
Start: 2021-03-23 | End: 2021-03-25 | Stop reason: HOSPADM

## 2021-03-23 RX ORDER — FLUTICASONE FUROATE AND VILANTEROL 200; 25 UG/1; UG/1
1 POWDER RESPIRATORY (INHALATION) DAILY
Refills: 11 | Status: DISCONTINUED | OUTPATIENT
Start: 2021-03-23 | End: 2021-03-25 | Stop reason: HOSPADM

## 2021-03-23 RX ORDER — CLONIDINE 0.1 MG/24H
1 PATCH, EXTENDED RELEASE TRANSDERMAL WEEKLY
Status: DISCONTINUED | OUTPATIENT
Start: 2021-03-23 | End: 2021-03-25 | Stop reason: HOSPADM

## 2021-03-23 RX ORDER — INSULIN ASPART 100 [IU]/ML
0-5 INJECTION, SOLUTION INTRAVENOUS; SUBCUTANEOUS
Status: DISCONTINUED | OUTPATIENT
Start: 2021-03-23 | End: 2021-03-25 | Stop reason: HOSPADM

## 2021-03-23 RX ORDER — IBUPROFEN 200 MG
24 TABLET ORAL
Status: DISCONTINUED | OUTPATIENT
Start: 2021-03-23 | End: 2021-03-25 | Stop reason: HOSPADM

## 2021-03-23 RX ORDER — ASPIRIN 81 MG/1
81 TABLET ORAL DAILY
Status: DISCONTINUED | OUTPATIENT
Start: 2021-03-23 | End: 2021-03-25 | Stop reason: HOSPADM

## 2021-03-23 RX ORDER — FLUTICASONE PROPIONATE AND SALMETEROL 100; 50 UG/1; UG/1
1 POWDER RESPIRATORY (INHALATION) 2 TIMES DAILY
COMMUNITY
End: 2021-05-13

## 2021-03-23 RX ORDER — GLUCAGON 1 MG
1 KIT INJECTION
Status: DISCONTINUED | OUTPATIENT
Start: 2021-03-23 | End: 2021-03-25 | Stop reason: HOSPADM

## 2021-03-23 RX ADMIN — PRAVASTATIN SODIUM 40 MG: 40 TABLET ORAL at 09:03

## 2021-03-23 RX ADMIN — CARVEDILOL 50 MG: 25 TABLET, FILM COATED ORAL at 09:03

## 2021-03-23 RX ADMIN — ASPIRIN 81 MG: 81 TABLET, COATED ORAL at 09:03

## 2021-03-23 RX ADMIN — CLONIDINE 1 PATCH: 0.1 PATCH TRANSDERMAL at 10:03

## 2021-03-23 RX ADMIN — HYDRALAZINE HYDROCHLORIDE 100 MG: 50 TABLET, FILM COATED ORAL at 08:03

## 2021-03-23 RX ADMIN — AZITHROMYCIN MONOHYDRATE 500 MG: 250 TABLET ORAL at 02:03

## 2021-03-23 RX ADMIN — LOSARTAN POTASSIUM 100 MG: 50 TABLET, FILM COATED ORAL at 09:03

## 2021-03-23 RX ADMIN — CARVEDILOL 50 MG: 25 TABLET, FILM COATED ORAL at 08:03

## 2021-03-23 RX ADMIN — FUROSEMIDE 40 MG: 10 INJECTION, SOLUTION INTRAMUSCULAR; INTRAVENOUS at 07:03

## 2021-03-23 RX ADMIN — DILTIAZEM HYDROCHLORIDE 180 MG: 180 CAPSULE, COATED, EXTENDED RELEASE ORAL at 09:03

## 2021-03-23 RX ADMIN — FLUTICASONE FUROATE AND VILANTEROL TRIFENATATE 1 PUFF: 200; 25 POWDER RESPIRATORY (INHALATION) at 12:03

## 2021-03-23 RX ADMIN — HEPARIN SODIUM 5000 UNITS: 5000 INJECTION INTRAVENOUS; SUBCUTANEOUS at 08:03

## 2021-03-23 RX ADMIN — CEFTRIAXONE 1 G: 1 INJECTION, POWDER, FOR SOLUTION INTRAMUSCULAR; INTRAVENOUS at 02:03

## 2021-03-23 RX ADMIN — INSULIN ASPART 5 UNITS: 100 INJECTION, SOLUTION INTRAVENOUS; SUBCUTANEOUS at 05:03

## 2021-03-23 RX ADMIN — FUROSEMIDE 40 MG: 10 INJECTION, SOLUTION INTRAMUSCULAR; INTRAVENOUS at 02:03

## 2021-03-23 RX ADMIN — HEPARIN SODIUM 5000 UNITS: 5000 INJECTION INTRAVENOUS; SUBCUTANEOUS at 02:03

## 2021-03-23 RX ADMIN — HYDRALAZINE HYDROCHLORIDE 100 MG: 50 TABLET, FILM COATED ORAL at 09:03

## 2021-03-23 RX ADMIN — POLYETHYLENE GLYCOL 3350 17 G: 17 POWDER, FOR SOLUTION ORAL at 09:03

## 2021-03-23 RX ADMIN — CHLORTHALIDONE 25 MG: 25 TABLET ORAL at 09:03

## 2021-03-23 RX ADMIN — HYDRALAZINE HYDROCHLORIDE 100 MG: 50 TABLET, FILM COATED ORAL at 04:03

## 2021-03-23 RX ADMIN — HYDRALAZINE HYDROCHLORIDE 100 MG: 50 TABLET, FILM COATED ORAL at 05:03

## 2021-03-23 RX ADMIN — HEPARIN SODIUM 5000 UNITS: 5000 INJECTION INTRAVENOUS; SUBCUTANEOUS at 05:03

## 2021-03-23 RX ADMIN — INSULIN DETEMIR 15 UNITS: 100 INJECTION, SOLUTION SUBCUTANEOUS at 08:03

## 2021-03-24 LAB
ALBUMIN SERPL BCP-MCNC: 2.4 G/DL (ref 3.5–5.2)
ALP SERPL-CCNC: 68 U/L (ref 55–135)
ALT SERPL W/O P-5'-P-CCNC: 11 U/L (ref 10–44)
ANION GAP SERPL CALC-SCNC: 10 MMOL/L (ref 8–16)
ANION GAP SERPL CALC-SCNC: 10 MMOL/L (ref 8–16)
ANION GAP SERPL CALC-SCNC: 11 MMOL/L (ref 8–16)
AST SERPL-CCNC: 15 U/L (ref 10–40)
BILIRUB SERPL-MCNC: 0.3 MG/DL (ref 0.1–1)
BUN SERPL-MCNC: 35 MG/DL (ref 8–23)
BUN SERPL-MCNC: 35 MG/DL (ref 8–23)
BUN SERPL-MCNC: 38 MG/DL (ref 8–23)
CALCIUM SERPL-MCNC: 8.6 MG/DL (ref 8.7–10.5)
CALCIUM SERPL-MCNC: 8.6 MG/DL (ref 8.7–10.5)
CALCIUM SERPL-MCNC: 8.9 MG/DL (ref 8.7–10.5)
CHLORIDE SERPL-SCNC: 98 MMOL/L (ref 95–110)
CHLORIDE SERPL-SCNC: 99 MMOL/L (ref 95–110)
CHLORIDE SERPL-SCNC: 99 MMOL/L (ref 95–110)
CO2 SERPL-SCNC: 26 MMOL/L (ref 23–29)
CO2 SERPL-SCNC: 27 MMOL/L (ref 23–29)
CO2 SERPL-SCNC: 27 MMOL/L (ref 23–29)
CREAT SERPL-MCNC: 1.6 MG/DL (ref 0.5–1.4)
CREAT SERPL-MCNC: 1.6 MG/DL (ref 0.5–1.4)
CREAT SERPL-MCNC: 1.8 MG/DL (ref 0.5–1.4)
EST. GFR  (AFRICAN AMERICAN): 30 ML/MIN/1.73 M^2
EST. GFR  (AFRICAN AMERICAN): 34.6 ML/MIN/1.73 M^2
EST. GFR  (AFRICAN AMERICAN): 34.6 ML/MIN/1.73 M^2
EST. GFR  (NON AFRICAN AMERICAN): 26 ML/MIN/1.73 M^2
EST. GFR  (NON AFRICAN AMERICAN): 30 ML/MIN/1.73 M^2
EST. GFR  (NON AFRICAN AMERICAN): 30 ML/MIN/1.73 M^2
GLUCOSE SERPL-MCNC: 211 MG/DL (ref 70–110)
GLUCOSE SERPL-MCNC: 62 MG/DL (ref 70–110)
GLUCOSE SERPL-MCNC: 62 MG/DL (ref 70–110)
MAGNESIUM SERPL-MCNC: 1.6 MG/DL (ref 1.6–2.6)
PHOSPHATE SERPL-MCNC: 3.6 MG/DL (ref 2.7–4.5)
POCT GLUCOSE: 244 MG/DL (ref 70–110)
POCT GLUCOSE: 269 MG/DL (ref 70–110)
POCT GLUCOSE: 62 MG/DL (ref 70–110)
POTASSIUM SERPL-SCNC: 3.4 MMOL/L (ref 3.5–5.1)
POTASSIUM SERPL-SCNC: 3.4 MMOL/L (ref 3.5–5.1)
POTASSIUM SERPL-SCNC: 3.8 MMOL/L (ref 3.5–5.1)
PROT SERPL-MCNC: 6 G/DL (ref 6–8.4)
SODIUM SERPL-SCNC: 135 MMOL/L (ref 136–145)
SODIUM SERPL-SCNC: 136 MMOL/L (ref 136–145)
SODIUM SERPL-SCNC: 136 MMOL/L (ref 136–145)

## 2021-03-24 PROCEDURE — 63600175 PHARM REV CODE 636 W HCPCS: Performed by: STUDENT IN AN ORGANIZED HEALTH CARE EDUCATION/TRAINING PROGRAM

## 2021-03-24 PROCEDURE — 94761 N-INVAS EAR/PLS OXIMETRY MLT: CPT

## 2021-03-24 PROCEDURE — 99233 SBSQ HOSP IP/OBS HIGH 50: CPT | Mod: GC,,, | Performed by: HOSPITALIST

## 2021-03-24 PROCEDURE — 25000003 PHARM REV CODE 250: Performed by: HOSPITALIST

## 2021-03-24 PROCEDURE — 84100 ASSAY OF PHOSPHORUS: CPT | Performed by: STUDENT IN AN ORGANIZED HEALTH CARE EDUCATION/TRAINING PROGRAM

## 2021-03-24 PROCEDURE — 25000242 PHARM REV CODE 250 ALT 637 W/ HCPCS: Performed by: STUDENT IN AN ORGANIZED HEALTH CARE EDUCATION/TRAINING PROGRAM

## 2021-03-24 PROCEDURE — 80048 BASIC METABOLIC PNL TOTAL CA: CPT | Performed by: STUDENT IN AN ORGANIZED HEALTH CARE EDUCATION/TRAINING PROGRAM

## 2021-03-24 PROCEDURE — 93010 ELECTROCARDIOGRAM REPORT: CPT | Mod: ,,, | Performed by: INTERNAL MEDICINE

## 2021-03-24 PROCEDURE — 25000003 PHARM REV CODE 250: Performed by: STUDENT IN AN ORGANIZED HEALTH CARE EDUCATION/TRAINING PROGRAM

## 2021-03-24 PROCEDURE — 27000221 HC OXYGEN, UP TO 24 HOURS

## 2021-03-24 PROCEDURE — 25000242 PHARM REV CODE 250 ALT 637 W/ HCPCS: Performed by: HOSPITALIST

## 2021-03-24 PROCEDURE — 93005 ELECTROCARDIOGRAM TRACING: CPT

## 2021-03-24 PROCEDURE — 93010 EKG 12-LEAD: ICD-10-PCS | Mod: ,,, | Performed by: INTERNAL MEDICINE

## 2021-03-24 PROCEDURE — 83735 ASSAY OF MAGNESIUM: CPT | Performed by: STUDENT IN AN ORGANIZED HEALTH CARE EDUCATION/TRAINING PROGRAM

## 2021-03-24 PROCEDURE — 97116 GAIT TRAINING THERAPY: CPT

## 2021-03-24 PROCEDURE — 99233 PR SUBSEQUENT HOSPITAL CARE,LEVL III: ICD-10-PCS | Mod: GC,,, | Performed by: HOSPITALIST

## 2021-03-24 PROCEDURE — 36415 COLL VENOUS BLD VENIPUNCTURE: CPT | Performed by: STUDENT IN AN ORGANIZED HEALTH CARE EDUCATION/TRAINING PROGRAM

## 2021-03-24 PROCEDURE — 94640 AIRWAY INHALATION TREATMENT: CPT

## 2021-03-24 PROCEDURE — 11000001 HC ACUTE MED/SURG PRIVATE ROOM

## 2021-03-24 PROCEDURE — 99900035 HC TECH TIME PER 15 MIN (STAT)

## 2021-03-24 PROCEDURE — 80053 COMPREHEN METABOLIC PANEL: CPT | Performed by: STUDENT IN AN ORGANIZED HEALTH CARE EDUCATION/TRAINING PROGRAM

## 2021-03-24 PROCEDURE — 97530 THERAPEUTIC ACTIVITIES: CPT

## 2021-03-24 RX ORDER — MONTELUKAST SODIUM 10 MG/1
10 TABLET ORAL NIGHTLY
Status: DISCONTINUED | OUTPATIENT
Start: 2021-03-24 | End: 2021-03-25 | Stop reason: HOSPADM

## 2021-03-24 RX ORDER — LEVOFLOXACIN 750 MG/1
750 TABLET ORAL EVERY OTHER DAY
Status: DISCONTINUED | OUTPATIENT
Start: 2021-03-24 | End: 2021-03-24

## 2021-03-24 RX ORDER — LEVOFLOXACIN 750 MG/1
750 TABLET ORAL EVERY OTHER DAY
Status: DISCONTINUED | OUTPATIENT
Start: 2021-03-24 | End: 2021-03-25

## 2021-03-24 RX ORDER — IPRATROPIUM BROMIDE AND ALBUTEROL SULFATE 2.5; .5 MG/3ML; MG/3ML
3 SOLUTION RESPIRATORY (INHALATION)
Status: DISCONTINUED | OUTPATIENT
Start: 2021-03-24 | End: 2021-03-25 | Stop reason: HOSPADM

## 2021-03-24 RX ADMIN — PRAVASTATIN SODIUM 40 MG: 40 TABLET ORAL at 08:03

## 2021-03-24 RX ADMIN — CARVEDILOL 50 MG: 25 TABLET, FILM COATED ORAL at 09:03

## 2021-03-24 RX ADMIN — POLYETHYLENE GLYCOL 3350 17 G: 17 POWDER, FOR SOLUTION ORAL at 08:03

## 2021-03-24 RX ADMIN — DILTIAZEM HYDROCHLORIDE 240 MG: 120 CAPSULE, COATED, EXTENDED RELEASE ORAL at 08:03

## 2021-03-24 RX ADMIN — CHLORTHALIDONE 25 MG: 25 TABLET ORAL at 08:03

## 2021-03-24 RX ADMIN — IPRATROPIUM BROMIDE AND ALBUTEROL SULFATE 3 ML: .5; 2.5 SOLUTION RESPIRATORY (INHALATION) at 07:03

## 2021-03-24 RX ADMIN — LOSARTAN POTASSIUM 100 MG: 50 TABLET, FILM COATED ORAL at 08:03

## 2021-03-24 RX ADMIN — Medication 16 G: at 08:03

## 2021-03-24 RX ADMIN — HEPARIN SODIUM 5000 UNITS: 5000 INJECTION INTRAVENOUS; SUBCUTANEOUS at 06:03

## 2021-03-24 RX ADMIN — CARVEDILOL 50 MG: 25 TABLET, FILM COATED ORAL at 08:03

## 2021-03-24 RX ADMIN — HEPARIN SODIUM 5000 UNITS: 5000 INJECTION INTRAVENOUS; SUBCUTANEOUS at 02:03

## 2021-03-24 RX ADMIN — IPRATROPIUM BROMIDE AND ALBUTEROL SULFATE 3 ML: .5; 2.5 SOLUTION RESPIRATORY (INHALATION) at 04:03

## 2021-03-24 RX ADMIN — HYDRALAZINE HYDROCHLORIDE 100 MG: 50 TABLET, FILM COATED ORAL at 02:03

## 2021-03-24 RX ADMIN — HYDRALAZINE HYDROCHLORIDE 100 MG: 50 TABLET, FILM COATED ORAL at 09:03

## 2021-03-24 RX ADMIN — HYDRALAZINE HYDROCHLORIDE 100 MG: 50 TABLET, FILM COATED ORAL at 08:03

## 2021-03-24 RX ADMIN — HEPARIN SODIUM 5000 UNITS: 5000 INJECTION INTRAVENOUS; SUBCUTANEOUS at 09:03

## 2021-03-24 RX ADMIN — FLUTICASONE FUROATE AND VILANTEROL TRIFENATATE 1 PUFF: 200; 25 POWDER RESPIRATORY (INHALATION) at 07:03

## 2021-03-24 RX ADMIN — MONTELUKAST 10 MG: 10 TABLET, FILM COATED ORAL at 09:03

## 2021-03-24 RX ADMIN — LEVOFLOXACIN 750 MG: 750 TABLET, FILM COATED ORAL at 02:03

## 2021-03-24 RX ADMIN — INSULIN ASPART 1 UNITS: 100 INJECTION, SOLUTION INTRAVENOUS; SUBCUTANEOUS at 09:03

## 2021-03-24 RX ADMIN — ASPIRIN 81 MG: 81 TABLET, COATED ORAL at 08:03

## 2021-03-25 VITALS
TEMPERATURE: 98 F | HEART RATE: 80 BPM | SYSTOLIC BLOOD PRESSURE: 166 MMHG | HEIGHT: 63 IN | RESPIRATION RATE: 15 BRPM | OXYGEN SATURATION: 99 % | WEIGHT: 156.5 LBS | DIASTOLIC BLOOD PRESSURE: 71 MMHG | BODY MASS INDEX: 27.73 KG/M2

## 2021-03-25 PROBLEM — I16.1 HYPERTENSIVE EMERGENCY: Status: RESOLVED | Noted: 2021-03-23 | Resolved: 2021-03-25

## 2021-03-25 PROBLEM — R09.02 HYPOXIA: Status: RESOLVED | Noted: 2021-03-23 | Resolved: 2021-03-25

## 2021-03-25 LAB
ALBUMIN SERPL BCP-MCNC: 2.7 G/DL (ref 3.5–5.2)
ALP SERPL-CCNC: 78 U/L (ref 55–135)
ALT SERPL W/O P-5'-P-CCNC: 15 U/L (ref 10–44)
ANION GAP SERPL CALC-SCNC: 13 MMOL/L (ref 8–16)
ANION GAP SERPL CALC-SCNC: 13 MMOL/L (ref 8–16)
AST SERPL-CCNC: 15 U/L (ref 10–40)
BACTERIA SPEC AEROBE CULT: NORMAL
BACTERIA SPEC AEROBE CULT: NORMAL
BILIRUB SERPL-MCNC: 0.4 MG/DL (ref 0.1–1)
BUN SERPL-MCNC: 38 MG/DL (ref 8–23)
BUN SERPL-MCNC: 38 MG/DL (ref 8–23)
CALCIUM SERPL-MCNC: 8.6 MG/DL (ref 8.7–10.5)
CALCIUM SERPL-MCNC: 8.6 MG/DL (ref 8.7–10.5)
CHLORIDE SERPL-SCNC: 99 MMOL/L (ref 95–110)
CHLORIDE SERPL-SCNC: 99 MMOL/L (ref 95–110)
CO2 SERPL-SCNC: 21 MMOL/L (ref 23–29)
CO2 SERPL-SCNC: 21 MMOL/L (ref 23–29)
CREAT SERPL-MCNC: 2.1 MG/DL (ref 0.5–1.4)
CREAT SERPL-MCNC: 2.1 MG/DL (ref 0.5–1.4)
EST. GFR  (AFRICAN AMERICAN): 24.9 ML/MIN/1.73 M^2
EST. GFR  (AFRICAN AMERICAN): 24.9 ML/MIN/1.73 M^2
EST. GFR  (NON AFRICAN AMERICAN): 21.6 ML/MIN/1.73 M^2
EST. GFR  (NON AFRICAN AMERICAN): 21.6 ML/MIN/1.73 M^2
GLUCOSE SERPL-MCNC: 199 MG/DL (ref 70–110)
GLUCOSE SERPL-MCNC: 199 MG/DL (ref 70–110)
GRAM STN SPEC: NORMAL
MAGNESIUM SERPL-MCNC: 1.6 MG/DL (ref 1.6–2.6)
PHOSPHATE SERPL-MCNC: 3.4 MG/DL (ref 2.7–4.5)
POCT GLUCOSE: 188 MG/DL (ref 70–110)
POTASSIUM SERPL-SCNC: 3.6 MMOL/L (ref 3.5–5.1)
POTASSIUM SERPL-SCNC: 3.6 MMOL/L (ref 3.5–5.1)
PROT SERPL-MCNC: 6.8 G/DL (ref 6–8.4)
SODIUM SERPL-SCNC: 133 MMOL/L (ref 136–145)
SODIUM SERPL-SCNC: 133 MMOL/L (ref 136–145)

## 2021-03-25 PROCEDURE — 1111F PR DISCHARGE MEDS RECONCILED W/ CURRENT OUTPATIENT MED LIST: ICD-10-PCS | Mod: CPTII,GC,, | Performed by: INTERNAL MEDICINE

## 2021-03-25 PROCEDURE — 83735 ASSAY OF MAGNESIUM: CPT | Performed by: STUDENT IN AN ORGANIZED HEALTH CARE EDUCATION/TRAINING PROGRAM

## 2021-03-25 PROCEDURE — 25000003 PHARM REV CODE 250: Performed by: STUDENT IN AN ORGANIZED HEALTH CARE EDUCATION/TRAINING PROGRAM

## 2021-03-25 PROCEDURE — 1111F DSCHRG MED/CURRENT MED MERGE: CPT | Mod: CPTII,GC,, | Performed by: INTERNAL MEDICINE

## 2021-03-25 PROCEDURE — 94640 AIRWAY INHALATION TREATMENT: CPT

## 2021-03-25 PROCEDURE — 94761 N-INVAS EAR/PLS OXIMETRY MLT: CPT

## 2021-03-25 PROCEDURE — 99239 HOSP IP/OBS DSCHRG MGMT >30: CPT | Mod: GC,,, | Performed by: INTERNAL MEDICINE

## 2021-03-25 PROCEDURE — 36415 COLL VENOUS BLD VENIPUNCTURE: CPT | Performed by: STUDENT IN AN ORGANIZED HEALTH CARE EDUCATION/TRAINING PROGRAM

## 2021-03-25 PROCEDURE — 63600175 PHARM REV CODE 636 W HCPCS: Performed by: STUDENT IN AN ORGANIZED HEALTH CARE EDUCATION/TRAINING PROGRAM

## 2021-03-25 PROCEDURE — 80053 COMPREHEN METABOLIC PANEL: CPT | Performed by: STUDENT IN AN ORGANIZED HEALTH CARE EDUCATION/TRAINING PROGRAM

## 2021-03-25 PROCEDURE — 25000242 PHARM REV CODE 250 ALT 637 W/ HCPCS: Performed by: HOSPITALIST

## 2021-03-25 PROCEDURE — 27000221 HC OXYGEN, UP TO 24 HOURS

## 2021-03-25 PROCEDURE — 99239 PR HOSPITAL DISCHARGE DAY,>30 MIN: ICD-10-PCS | Mod: GC,,, | Performed by: INTERNAL MEDICINE

## 2021-03-25 PROCEDURE — 84100 ASSAY OF PHOSPHORUS: CPT | Performed by: STUDENT IN AN ORGANIZED HEALTH CARE EDUCATION/TRAINING PROGRAM

## 2021-03-25 PROCEDURE — 99900035 HC TECH TIME PER 15 MIN (STAT)

## 2021-03-25 RX ORDER — LEVOFLOXACIN 500 MG/1
500 TABLET, FILM COATED ORAL EVERY OTHER DAY
Qty: 2 TABLET | Refills: 0 | Status: SHIPPED | OUTPATIENT
Start: 2021-03-26 | End: 2021-03-30

## 2021-03-25 RX ORDER — LEVOFLOXACIN 750 MG/1
750 TABLET ORAL EVERY OTHER DAY
Qty: 3 TABLET | Refills: 0 | Status: SHIPPED | OUTPATIENT
Start: 2021-03-26 | End: 2021-03-25

## 2021-03-25 RX ORDER — DILTIAZEM HYDROCHLORIDE 240 MG/1
240 CAPSULE, COATED, EXTENDED RELEASE ORAL DAILY
Qty: 30 CAPSULE | Refills: 11 | Status: SHIPPED | OUTPATIENT
Start: 2021-03-25 | End: 2022-03-09 | Stop reason: SDUPTHER

## 2021-03-25 RX ORDER — LEVOFLOXACIN 500 MG/1
500 TABLET, FILM COATED ORAL EVERY OTHER DAY
Status: DISCONTINUED | OUTPATIENT
Start: 2021-03-26 | End: 2021-03-25 | Stop reason: HOSPADM

## 2021-03-25 RX ORDER — LEVOFLOXACIN 750 MG/1
750 TABLET ORAL EVERY OTHER DAY
Qty: 2 TABLET | Refills: 0 | Status: SHIPPED | OUTPATIENT
Start: 2021-03-26 | End: 2021-03-25 | Stop reason: HOSPADM

## 2021-03-25 RX ADMIN — HYDRALAZINE HYDROCHLORIDE 100 MG: 50 TABLET, FILM COATED ORAL at 09:03

## 2021-03-25 RX ADMIN — PRAVASTATIN SODIUM 40 MG: 40 TABLET ORAL at 09:03

## 2021-03-25 RX ADMIN — FLUTICASONE FUROATE AND VILANTEROL TRIFENATATE 1 PUFF: 200; 25 POWDER RESPIRATORY (INHALATION) at 07:03

## 2021-03-25 RX ADMIN — LOSARTAN POTASSIUM 100 MG: 50 TABLET, FILM COATED ORAL at 09:03

## 2021-03-25 RX ADMIN — IPRATROPIUM BROMIDE AND ALBUTEROL SULFATE 3 ML: .5; 2.5 SOLUTION RESPIRATORY (INHALATION) at 07:03

## 2021-03-25 RX ADMIN — HEPARIN SODIUM 5000 UNITS: 5000 INJECTION INTRAVENOUS; SUBCUTANEOUS at 05:03

## 2021-03-25 RX ADMIN — CHLORTHALIDONE 25 MG: 25 TABLET ORAL at 09:03

## 2021-03-25 RX ADMIN — DILTIAZEM HYDROCHLORIDE 240 MG: 120 CAPSULE, COATED, EXTENDED RELEASE ORAL at 09:03

## 2021-03-25 RX ADMIN — CARVEDILOL 50 MG: 25 TABLET, FILM COATED ORAL at 09:03

## 2021-03-25 RX ADMIN — ASPIRIN 81 MG: 81 TABLET, COATED ORAL at 09:03

## 2021-03-26 ENCOUNTER — TELEPHONE (OUTPATIENT)
Dept: INTERNAL MEDICINE | Facility: CLINIC | Age: 81
End: 2021-03-26

## 2021-03-26 RX ORDER — IPRATROPIUM BROMIDE AND ALBUTEROL SULFATE 2.5; .5 MG/3ML; MG/3ML
3 SOLUTION RESPIRATORY (INHALATION) EVERY 4 HOURS PRN
Qty: 3 BOX | Refills: 12 | Status: SHIPPED | OUTPATIENT
Start: 2021-03-26 | End: 2021-12-09

## 2021-03-30 ENCOUNTER — LAB VISIT (OUTPATIENT)
Dept: LAB | Facility: HOSPITAL | Age: 81
End: 2021-03-30
Attending: INTERNAL MEDICINE
Payer: MEDICARE

## 2021-03-30 ENCOUNTER — OFFICE VISIT (OUTPATIENT)
Dept: INTERNAL MEDICINE | Facility: CLINIC | Age: 81
End: 2021-03-30
Payer: MEDICARE

## 2021-03-30 ENCOUNTER — TELEPHONE (OUTPATIENT)
Dept: INTERNAL MEDICINE | Facility: CLINIC | Age: 81
End: 2021-03-30

## 2021-03-30 VITALS
DIASTOLIC BLOOD PRESSURE: 58 MMHG | HEART RATE: 76 BPM | WEIGHT: 158.06 LBS | OXYGEN SATURATION: 99 % | HEIGHT: 63 IN | BODY MASS INDEX: 28 KG/M2 | SYSTOLIC BLOOD PRESSURE: 92 MMHG

## 2021-03-30 DIAGNOSIS — I15.2 HYPERTENSION ASSOCIATED WITH DIABETES: ICD-10-CM

## 2021-03-30 DIAGNOSIS — D50.9 IRON DEFICIENCY ANEMIA, UNSPECIFIED IRON DEFICIENCY ANEMIA TYPE: ICD-10-CM

## 2021-03-30 DIAGNOSIS — N18.4 CKD (CHRONIC KIDNEY DISEASE) STAGE 4, GFR 15-29 ML/MIN: ICD-10-CM

## 2021-03-30 DIAGNOSIS — I50.43 ACUTE ON CHRONIC COMBINED SYSTOLIC AND DIASTOLIC HEART FAILURE: ICD-10-CM

## 2021-03-30 DIAGNOSIS — J18.9 PNEUMONIA OF BOTH LUNGS DUE TO INFECTIOUS ORGANISM, UNSPECIFIED PART OF LUNG: ICD-10-CM

## 2021-03-30 DIAGNOSIS — J96.01 ACUTE HYPOXEMIC RESPIRATORY FAILURE: ICD-10-CM

## 2021-03-30 DIAGNOSIS — Z79.4 CONTROLLED TYPE 2 DIABETES MELLITUS WITH BOTH EYES AFFECTED BY MILD NONPROLIFERATIVE RETINOPATHY AND MACULAR EDEMA, WITH LONG-TERM CURRENT USE OF INSULIN: ICD-10-CM

## 2021-03-30 DIAGNOSIS — E11.3213 CONTROLLED TYPE 2 DIABETES MELLITUS WITH BOTH EYES AFFECTED BY MILD NONPROLIFERATIVE RETINOPATHY AND MACULAR EDEMA, WITH LONG-TERM CURRENT USE OF INSULIN: ICD-10-CM

## 2021-03-30 DIAGNOSIS — E11.59 HYPERTENSION ASSOCIATED WITH DIABETES: ICD-10-CM

## 2021-03-30 DIAGNOSIS — Z09 HOSPITAL DISCHARGE FOLLOW-UP: Primary | ICD-10-CM

## 2021-03-30 DIAGNOSIS — N17.9 AKI (ACUTE KIDNEY INJURY): ICD-10-CM

## 2021-03-30 LAB
ALBUMIN SERPL BCP-MCNC: 3.1 G/DL (ref 3.5–5.2)
ALBUMIN SERPL BCP-MCNC: 3.1 G/DL (ref 3.5–5.2)
ALP SERPL-CCNC: 78 U/L (ref 55–135)
ALT SERPL W/O P-5'-P-CCNC: 20 U/L (ref 10–44)
ANION GAP SERPL CALC-SCNC: 11 MMOL/L (ref 8–16)
ANION GAP SERPL CALC-SCNC: 11 MMOL/L (ref 8–16)
AST SERPL-CCNC: 25 U/L (ref 10–40)
BASOPHILS # BLD AUTO: 0.11 K/UL (ref 0–0.2)
BASOPHILS NFR BLD: 1.1 % (ref 0–1.9)
BILIRUB SERPL-MCNC: 0.2 MG/DL (ref 0.1–1)
BNP SERPL-MCNC: 143 PG/ML (ref 0–99)
BUN SERPL-MCNC: 50 MG/DL (ref 8–23)
BUN SERPL-MCNC: 50 MG/DL (ref 8–23)
CALCIUM SERPL-MCNC: 8.8 MG/DL (ref 8.7–10.5)
CALCIUM SERPL-MCNC: 8.8 MG/DL (ref 8.7–10.5)
CHLORIDE SERPL-SCNC: 97 MMOL/L (ref 95–110)
CHLORIDE SERPL-SCNC: 97 MMOL/L (ref 95–110)
CO2 SERPL-SCNC: 23 MMOL/L (ref 23–29)
CO2 SERPL-SCNC: 23 MMOL/L (ref 23–29)
CREAT SERPL-MCNC: 2.4 MG/DL (ref 0.5–1.4)
CREAT SERPL-MCNC: 2.4 MG/DL (ref 0.5–1.4)
DIFFERENTIAL METHOD: ABNORMAL
EOSINOPHIL # BLD AUTO: 0.4 K/UL (ref 0–0.5)
EOSINOPHIL NFR BLD: 4.1 % (ref 0–8)
ERYTHROCYTE [DISTWIDTH] IN BLOOD BY AUTOMATED COUNT: 13.2 % (ref 11.5–14.5)
EST. GFR  (AFRICAN AMERICAN): 21.2 ML/MIN/1.73 M^2
EST. GFR  (AFRICAN AMERICAN): 21.2 ML/MIN/1.73 M^2
EST. GFR  (NON AFRICAN AMERICAN): 18.4 ML/MIN/1.73 M^2
EST. GFR  (NON AFRICAN AMERICAN): 18.4 ML/MIN/1.73 M^2
GLUCOSE SERPL-MCNC: 134 MG/DL (ref 70–110)
GLUCOSE SERPL-MCNC: 134 MG/DL (ref 70–110)
HCT VFR BLD AUTO: 30.8 % (ref 37–48.5)
HGB BLD-MCNC: 9.7 G/DL (ref 12–16)
IMM GRANULOCYTES # BLD AUTO: 0.42 K/UL (ref 0–0.04)
IMM GRANULOCYTES NFR BLD AUTO: 4.4 % (ref 0–0.5)
LYMPHOCYTES # BLD AUTO: 1.6 K/UL (ref 1–4.8)
LYMPHOCYTES NFR BLD: 17.1 % (ref 18–48)
MCH RBC QN AUTO: 30.2 PG (ref 27–31)
MCHC RBC AUTO-ENTMCNC: 31.5 G/DL (ref 32–36)
MCV RBC AUTO: 96 FL (ref 82–98)
MONOCYTES # BLD AUTO: 0.6 K/UL (ref 0.3–1)
MONOCYTES NFR BLD: 6.5 % (ref 4–15)
NEUTROPHILS # BLD AUTO: 6.4 K/UL (ref 1.8–7.7)
NEUTROPHILS NFR BLD: 66.8 % (ref 38–73)
NRBC BLD-RTO: 0 /100 WBC
PHOSPHATE SERPL-MCNC: 4.3 MG/DL (ref 2.7–4.5)
PLATELET # BLD AUTO: 331 K/UL (ref 150–450)
PMV BLD AUTO: 11.2 FL (ref 9.2–12.9)
POTASSIUM SERPL-SCNC: 5 MMOL/L (ref 3.5–5.1)
POTASSIUM SERPL-SCNC: 5 MMOL/L (ref 3.5–5.1)
PROT SERPL-MCNC: 6.9 G/DL (ref 6–8.4)
RBC # BLD AUTO: 3.21 M/UL (ref 4–5.4)
SODIUM SERPL-SCNC: 131 MMOL/L (ref 136–145)
SODIUM SERPL-SCNC: 131 MMOL/L (ref 136–145)
WBC # BLD AUTO: 9.57 K/UL (ref 3.9–12.7)

## 2021-03-30 PROCEDURE — 3288F FALL RISK ASSESSMENT DOCD: CPT | Mod: CPTII,S$GLB,, | Performed by: INTERNAL MEDICINE

## 2021-03-30 PROCEDURE — 99499 UNLISTED E&M SERVICE: CPT | Mod: S$GLB,,, | Performed by: INTERNAL MEDICINE

## 2021-03-30 PROCEDURE — 36415 COLL VENOUS BLD VENIPUNCTURE: CPT | Performed by: INTERNAL MEDICINE

## 2021-03-30 PROCEDURE — 1101F PR PT FALLS ASSESS DOC 0-1 FALLS W/OUT INJ PAST YR: ICD-10-PCS | Mod: CPTII,S$GLB,, | Performed by: INTERNAL MEDICINE

## 2021-03-30 PROCEDURE — 99999 PR PBB SHADOW E&M-EST. PATIENT-LVL III: CPT | Mod: PBBFAC,,, | Performed by: INTERNAL MEDICINE

## 2021-03-30 PROCEDURE — 1126F AMNT PAIN NOTED NONE PRSNT: CPT | Mod: S$GLB,,, | Performed by: INTERNAL MEDICINE

## 2021-03-30 PROCEDURE — 80053 COMPREHEN METABOLIC PANEL: CPT | Performed by: INTERNAL MEDICINE

## 2021-03-30 PROCEDURE — 85025 COMPLETE CBC W/AUTO DIFF WBC: CPT | Performed by: INTERNAL MEDICINE

## 2021-03-30 PROCEDURE — 99499 RISK ADDL DX/OHS AUDIT: ICD-10-PCS | Mod: S$GLB,,, | Performed by: INTERNAL MEDICINE

## 2021-03-30 PROCEDURE — 99999 PR PBB SHADOW E&M-EST. PATIENT-LVL III: ICD-10-PCS | Mod: PBBFAC,,, | Performed by: INTERNAL MEDICINE

## 2021-03-30 PROCEDURE — 84100 ASSAY OF PHOSPHORUS: CPT | Performed by: INTERNAL MEDICINE

## 2021-03-30 PROCEDURE — 3288F PR FALLS RISK ASSESSMENT DOCUMENTED: ICD-10-PCS | Mod: CPTII,S$GLB,, | Performed by: INTERNAL MEDICINE

## 2021-03-30 PROCEDURE — 1126F PR PAIN SEVERITY QUANTIFIED, NO PAIN PRESENT: ICD-10-PCS | Mod: S$GLB,,, | Performed by: INTERNAL MEDICINE

## 2021-03-30 PROCEDURE — 83880 ASSAY OF NATRIURETIC PEPTIDE: CPT | Performed by: INTERNAL MEDICINE

## 2021-03-30 PROCEDURE — 1101F PT FALLS ASSESS-DOCD LE1/YR: CPT | Mod: CPTII,S$GLB,, | Performed by: INTERNAL MEDICINE

## 2021-03-30 PROCEDURE — 99214 PR OFFICE/OUTPT VISIT, EST, LEVL IV, 30-39 MIN: ICD-10-PCS | Mod: S$GLB,,, | Performed by: INTERNAL MEDICINE

## 2021-03-30 PROCEDURE — 99214 OFFICE O/P EST MOD 30 MIN: CPT | Mod: S$GLB,,, | Performed by: INTERNAL MEDICINE

## 2021-04-01 ENCOUNTER — TELEPHONE (OUTPATIENT)
Dept: NEPHROLOGY | Facility: CLINIC | Age: 81
End: 2021-04-01

## 2021-04-05 ENCOUNTER — INFUSION (OUTPATIENT)
Dept: INFECTIOUS DISEASES | Facility: HOSPITAL | Age: 81
End: 2021-04-05
Attending: INTERNAL MEDICINE
Payer: MEDICARE

## 2021-04-05 VITALS
BODY MASS INDEX: 27.57 KG/M2 | DIASTOLIC BLOOD PRESSURE: 54 MMHG | TEMPERATURE: 98 F | SYSTOLIC BLOOD PRESSURE: 111 MMHG | HEART RATE: 74 BPM | HEIGHT: 64 IN | RESPIRATION RATE: 16 BRPM | WEIGHT: 161.5 LBS | OXYGEN SATURATION: 97 %

## 2021-04-05 DIAGNOSIS — D50.8 OTHER IRON DEFICIENCY ANEMIA: Primary | ICD-10-CM

## 2021-04-05 PROCEDURE — 25000003 PHARM REV CODE 250: Performed by: INTERNAL MEDICINE

## 2021-04-05 PROCEDURE — 96365 THER/PROPH/DIAG IV INF INIT: CPT

## 2021-04-05 PROCEDURE — 63600175 PHARM REV CODE 636 W HCPCS: Performed by: INTERNAL MEDICINE

## 2021-04-05 RX ORDER — HEPARIN 100 UNIT/ML
500 SYRINGE INTRAVENOUS
Status: CANCELLED | OUTPATIENT
Start: 2021-04-12

## 2021-04-05 RX ORDER — SODIUM CHLORIDE 0.9 % (FLUSH) 0.9 %
10 SYRINGE (ML) INJECTION
Status: DISCONTINUED | OUTPATIENT
Start: 2021-04-05 | End: 2021-04-05 | Stop reason: HOSPADM

## 2021-04-05 RX ORDER — HEPARIN 100 UNIT/ML
500 SYRINGE INTRAVENOUS
Status: DISCONTINUED | OUTPATIENT
Start: 2021-04-05 | End: 2021-04-05 | Stop reason: HOSPADM

## 2021-04-05 RX ORDER — SODIUM CHLORIDE 0.9 % (FLUSH) 0.9 %
10 SYRINGE (ML) INJECTION
Status: CANCELLED | OUTPATIENT
Start: 2021-04-12

## 2021-04-05 RX ADMIN — SODIUM CHLORIDE: 0.9 INJECTION, SOLUTION INTRAVENOUS at 09:04

## 2021-04-05 RX ADMIN — IRON SUCROSE 100 MG: 20 INJECTION, SOLUTION INTRAVENOUS at 09:04

## 2021-04-06 ENCOUNTER — TELEPHONE (OUTPATIENT)
Dept: ENDOCRINOLOGY | Facility: CLINIC | Age: 81
End: 2021-04-06

## 2021-04-06 ENCOUNTER — OFFICE VISIT (OUTPATIENT)
Dept: INTERNAL MEDICINE | Facility: CLINIC | Age: 81
End: 2021-04-06
Payer: MEDICARE

## 2021-04-06 VITALS
HEART RATE: 72 BPM | HEIGHT: 64 IN | WEIGHT: 162.94 LBS | SYSTOLIC BLOOD PRESSURE: 138 MMHG | OXYGEN SATURATION: 96 % | DIASTOLIC BLOOD PRESSURE: 64 MMHG | BODY MASS INDEX: 27.82 KG/M2

## 2021-04-06 DIAGNOSIS — N18.4 CKD (CHRONIC KIDNEY DISEASE) STAGE 4, GFR 15-29 ML/MIN: ICD-10-CM

## 2021-04-06 DIAGNOSIS — Z79.4 CONTROLLED TYPE 2 DIABETES MELLITUS WITH BOTH EYES AFFECTED BY MILD NONPROLIFERATIVE RETINOPATHY AND MACULAR EDEMA, WITH LONG-TERM CURRENT USE OF INSULIN: Primary | ICD-10-CM

## 2021-04-06 DIAGNOSIS — E11.3213 CONTROLLED TYPE 2 DIABETES MELLITUS WITH BOTH EYES AFFECTED BY MILD NONPROLIFERATIVE RETINOPATHY AND MACULAR EDEMA, WITH LONG-TERM CURRENT USE OF INSULIN: Primary | ICD-10-CM

## 2021-04-06 DIAGNOSIS — I50.42 CHRONIC COMBINED SYSTOLIC AND DIASTOLIC HEART FAILURE: ICD-10-CM

## 2021-04-06 PROCEDURE — 1159F MED LIST DOCD IN RCRD: CPT | Mod: S$GLB,,, | Performed by: INTERNAL MEDICINE

## 2021-04-06 PROCEDURE — 1126F PR PAIN SEVERITY QUANTIFIED, NO PAIN PRESENT: ICD-10-PCS | Mod: S$GLB,,, | Performed by: INTERNAL MEDICINE

## 2021-04-06 PROCEDURE — 1101F PR PT FALLS ASSESS DOC 0-1 FALLS W/OUT INJ PAST YR: ICD-10-PCS | Mod: CPTII,S$GLB,, | Performed by: INTERNAL MEDICINE

## 2021-04-06 PROCEDURE — 99214 PR OFFICE/OUTPT VISIT, EST, LEVL IV, 30-39 MIN: ICD-10-PCS | Mod: S$GLB,,, | Performed by: INTERNAL MEDICINE

## 2021-04-06 PROCEDURE — 3078F DIAST BP <80 MM HG: CPT | Mod: CPTII,S$GLB,, | Performed by: INTERNAL MEDICINE

## 2021-04-06 PROCEDURE — 99214 OFFICE O/P EST MOD 30 MIN: CPT | Mod: S$GLB,,, | Performed by: INTERNAL MEDICINE

## 2021-04-06 PROCEDURE — 99999 PR PBB SHADOW E&M-EST. PATIENT-LVL V: CPT | Mod: PBBFAC,,, | Performed by: INTERNAL MEDICINE

## 2021-04-06 PROCEDURE — 3288F FALL RISK ASSESSMENT DOCD: CPT | Mod: CPTII,S$GLB,, | Performed by: INTERNAL MEDICINE

## 2021-04-06 PROCEDURE — 3288F PR FALLS RISK ASSESSMENT DOCUMENTED: ICD-10-PCS | Mod: CPTII,S$GLB,, | Performed by: INTERNAL MEDICINE

## 2021-04-06 PROCEDURE — 1126F AMNT PAIN NOTED NONE PRSNT: CPT | Mod: S$GLB,,, | Performed by: INTERNAL MEDICINE

## 2021-04-06 PROCEDURE — 99999 PR PBB SHADOW E&M-EST. PATIENT-LVL V: ICD-10-PCS | Mod: PBBFAC,,, | Performed by: INTERNAL MEDICINE

## 2021-04-06 PROCEDURE — 1159F PR MEDICATION LIST DOCUMENTED IN MEDICAL RECORD: ICD-10-PCS | Mod: S$GLB,,, | Performed by: INTERNAL MEDICINE

## 2021-04-06 PROCEDURE — 3075F PR MOST RECENT SYSTOLIC BLOOD PRESS GE 130-139MM HG: ICD-10-PCS | Mod: CPTII,S$GLB,, | Performed by: INTERNAL MEDICINE

## 2021-04-06 PROCEDURE — 1101F PT FALLS ASSESS-DOCD LE1/YR: CPT | Mod: CPTII,S$GLB,, | Performed by: INTERNAL MEDICINE

## 2021-04-06 PROCEDURE — 3075F SYST BP GE 130 - 139MM HG: CPT | Mod: CPTII,S$GLB,, | Performed by: INTERNAL MEDICINE

## 2021-04-06 PROCEDURE — 3078F PR MOST RECENT DIASTOLIC BLOOD PRESSURE < 80 MM HG: ICD-10-PCS | Mod: CPTII,S$GLB,, | Performed by: INTERNAL MEDICINE

## 2021-04-08 ENCOUNTER — OFFICE VISIT (OUTPATIENT)
Dept: CARDIOLOGY | Facility: CLINIC | Age: 81
End: 2021-04-08
Payer: MEDICARE

## 2021-04-08 VITALS
SYSTOLIC BLOOD PRESSURE: 152 MMHG | HEART RATE: 84 BPM | WEIGHT: 160.5 LBS | BODY MASS INDEX: 28.44 KG/M2 | HEIGHT: 63 IN | DIASTOLIC BLOOD PRESSURE: 67 MMHG

## 2021-04-08 DIAGNOSIS — E11.59 HYPERTENSION ASSOCIATED WITH DIABETES: ICD-10-CM

## 2021-04-08 DIAGNOSIS — I42.8 NONISCHEMIC CARDIOMYOPATHY: ICD-10-CM

## 2021-04-08 DIAGNOSIS — Z95.810 BIVENTRICULAR ICD (IMPLANTABLE CARDIOVERTER-DEFIBRILLATOR) IN PLACE: ICD-10-CM

## 2021-04-08 DIAGNOSIS — I50.42 CHRONIC COMBINED SYSTOLIC AND DIASTOLIC HEART FAILURE: Primary | ICD-10-CM

## 2021-04-08 DIAGNOSIS — N18.4 CKD (CHRONIC KIDNEY DISEASE) STAGE 4, GFR 15-29 ML/MIN: ICD-10-CM

## 2021-04-08 DIAGNOSIS — I50.43 ACUTE ON CHRONIC COMBINED SYSTOLIC AND DIASTOLIC HEART FAILURE: ICD-10-CM

## 2021-04-08 DIAGNOSIS — I44.7 LBBB (LEFT BUNDLE BRANCH BLOCK): ICD-10-CM

## 2021-04-08 DIAGNOSIS — I15.2 HYPERTENSION ASSOCIATED WITH DIABETES: ICD-10-CM

## 2021-04-08 DIAGNOSIS — I10 ESSENTIAL HYPERTENSION: ICD-10-CM

## 2021-04-08 PROCEDURE — 99999 PR PBB SHADOW E&M-EST. PATIENT-LVL III: ICD-10-PCS | Mod: PBBFAC,,, | Performed by: INTERNAL MEDICINE

## 2021-04-08 PROCEDURE — 1159F MED LIST DOCD IN RCRD: CPT | Mod: S$GLB,,, | Performed by: INTERNAL MEDICINE

## 2021-04-08 PROCEDURE — 1126F PR PAIN SEVERITY QUANTIFIED, NO PAIN PRESENT: ICD-10-PCS | Mod: S$GLB,,, | Performed by: INTERNAL MEDICINE

## 2021-04-08 PROCEDURE — 99999 PR PBB SHADOW E&M-EST. PATIENT-LVL III: CPT | Mod: PBBFAC,,, | Performed by: INTERNAL MEDICINE

## 2021-04-08 PROCEDURE — 99214 PR OFFICE/OUTPT VISIT, EST, LEVL IV, 30-39 MIN: ICD-10-PCS | Mod: S$GLB,,, | Performed by: INTERNAL MEDICINE

## 2021-04-08 PROCEDURE — 1159F PR MEDICATION LIST DOCUMENTED IN MEDICAL RECORD: ICD-10-PCS | Mod: S$GLB,,, | Performed by: INTERNAL MEDICINE

## 2021-04-08 PROCEDURE — 1126F AMNT PAIN NOTED NONE PRSNT: CPT | Mod: S$GLB,,, | Performed by: INTERNAL MEDICINE

## 2021-04-08 PROCEDURE — 99214 OFFICE O/P EST MOD 30 MIN: CPT | Mod: S$GLB,,, | Performed by: INTERNAL MEDICINE

## 2021-04-08 RX ORDER — CLONIDINE 0.1 MG/24H
1 PATCH, EXTENDED RELEASE TRANSDERMAL
Qty: 12 PATCH | Refills: 11 | Status: SHIPPED | OUTPATIENT
Start: 2021-04-13 | End: 2022-04-26

## 2021-04-12 ENCOUNTER — INFUSION (OUTPATIENT)
Dept: INFECTIOUS DISEASES | Facility: HOSPITAL | Age: 81
End: 2021-04-12
Attending: INTERNAL MEDICINE
Payer: MEDICARE

## 2021-04-12 VITALS
HEART RATE: 83 BPM | WEIGHT: 159.19 LBS | OXYGEN SATURATION: 97 % | DIASTOLIC BLOOD PRESSURE: 70 MMHG | SYSTOLIC BLOOD PRESSURE: 153 MMHG | HEIGHT: 64 IN | TEMPERATURE: 98 F | BODY MASS INDEX: 27.18 KG/M2 | RESPIRATION RATE: 17 BRPM

## 2021-04-12 DIAGNOSIS — D50.8 OTHER IRON DEFICIENCY ANEMIA: Primary | ICD-10-CM

## 2021-04-12 PROCEDURE — 25000003 PHARM REV CODE 250: Performed by: INTERNAL MEDICINE

## 2021-04-12 PROCEDURE — 63600175 PHARM REV CODE 636 W HCPCS: Performed by: INTERNAL MEDICINE

## 2021-04-12 PROCEDURE — 96365 THER/PROPH/DIAG IV INF INIT: CPT

## 2021-04-12 RX ORDER — SODIUM CHLORIDE 0.9 % (FLUSH) 0.9 %
10 SYRINGE (ML) INJECTION
Status: CANCELLED | OUTPATIENT
Start: 2021-04-19

## 2021-04-12 RX ORDER — HEPARIN 100 UNIT/ML
500 SYRINGE INTRAVENOUS
Status: DISCONTINUED | OUTPATIENT
Start: 2021-04-12 | End: 2021-04-12 | Stop reason: HOSPADM

## 2021-04-12 RX ORDER — HEPARIN 100 UNIT/ML
500 SYRINGE INTRAVENOUS
Status: CANCELLED | OUTPATIENT
Start: 2021-04-19

## 2021-04-12 RX ORDER — SODIUM CHLORIDE 0.9 % (FLUSH) 0.9 %
10 SYRINGE (ML) INJECTION
Status: DISCONTINUED | OUTPATIENT
Start: 2021-04-12 | End: 2021-04-12 | Stop reason: HOSPADM

## 2021-04-12 RX ADMIN — IRON SUCROSE 100 MG: 20 INJECTION, SOLUTION INTRAVENOUS at 09:04

## 2021-04-12 RX ADMIN — SODIUM CHLORIDE: 0.9 INJECTION, SOLUTION INTRAVENOUS at 09:04

## 2021-04-14 ENCOUNTER — LAB VISIT (OUTPATIENT)
Dept: LAB | Facility: HOSPITAL | Age: 81
End: 2021-04-14
Attending: INTERNAL MEDICINE
Payer: MEDICARE

## 2021-04-14 ENCOUNTER — OFFICE VISIT (OUTPATIENT)
Dept: NEPHROLOGY | Facility: CLINIC | Age: 81
End: 2021-04-14
Payer: MEDICARE

## 2021-04-14 VITALS
SYSTOLIC BLOOD PRESSURE: 120 MMHG | OXYGEN SATURATION: 95 % | WEIGHT: 160.06 LBS | HEIGHT: 64 IN | DIASTOLIC BLOOD PRESSURE: 60 MMHG | BODY MASS INDEX: 27.33 KG/M2 | HEART RATE: 73 BPM

## 2021-04-14 DIAGNOSIS — N18.4 CHRONIC KIDNEY DISEASE, STAGE IV (SEVERE): Primary | ICD-10-CM

## 2021-04-14 DIAGNOSIS — N18.4 CHRONIC KIDNEY DISEASE, STAGE IV (SEVERE): ICD-10-CM

## 2021-04-14 LAB
ANION GAP SERPL CALC-SCNC: 9 MMOL/L (ref 8–16)
BASOPHILS # BLD AUTO: 0.05 K/UL (ref 0–0.2)
BASOPHILS NFR BLD: 0.7 % (ref 0–1.9)
BUN SERPL-MCNC: 47 MG/DL (ref 8–23)
CALCIUM SERPL-MCNC: 8.8 MG/DL (ref 8.7–10.5)
CHLORIDE SERPL-SCNC: 103 MMOL/L (ref 95–110)
CO2 SERPL-SCNC: 21 MMOL/L (ref 23–29)
CREAT SERPL-MCNC: 1.8 MG/DL (ref 0.5–1.4)
DIFFERENTIAL METHOD: ABNORMAL
EOSINOPHIL # BLD AUTO: 0.2 K/UL (ref 0–0.5)
EOSINOPHIL NFR BLD: 2.7 % (ref 0–8)
ERYTHROCYTE [DISTWIDTH] IN BLOOD BY AUTOMATED COUNT: 13.4 % (ref 11.5–14.5)
EST. GFR  (AFRICAN AMERICAN): 30 ML/MIN/1.73 M^2
EST. GFR  (NON AFRICAN AMERICAN): 26 ML/MIN/1.73 M^2
GLUCOSE SERPL-MCNC: 274 MG/DL (ref 70–110)
HCT VFR BLD AUTO: 30.5 % (ref 37–48.5)
HGB BLD-MCNC: 9.7 G/DL (ref 12–16)
IMM GRANULOCYTES # BLD AUTO: 0.07 K/UL (ref 0–0.04)
IMM GRANULOCYTES NFR BLD AUTO: 0.9 % (ref 0–0.5)
LYMPHOCYTES # BLD AUTO: 1.1 K/UL (ref 1–4.8)
LYMPHOCYTES NFR BLD: 14.5 % (ref 18–48)
MCH RBC QN AUTO: 30 PG (ref 27–31)
MCHC RBC AUTO-ENTMCNC: 31.8 G/DL (ref 32–36)
MCV RBC AUTO: 94 FL (ref 82–98)
MONOCYTES # BLD AUTO: 0.6 K/UL (ref 0.3–1)
MONOCYTES NFR BLD: 7.3 % (ref 4–15)
NEUTROPHILS # BLD AUTO: 5.6 K/UL (ref 1.8–7.7)
NEUTROPHILS NFR BLD: 73.9 % (ref 38–73)
NRBC BLD-RTO: 0 /100 WBC
PLATELET # BLD AUTO: 256 K/UL (ref 150–450)
PMV BLD AUTO: 11.1 FL (ref 9.2–12.9)
POTASSIUM SERPL-SCNC: 4.9 MMOL/L (ref 3.5–5.1)
RBC # BLD AUTO: 3.23 M/UL (ref 4–5.4)
SODIUM SERPL-SCNC: 133 MMOL/L (ref 136–145)
WBC # BLD AUTO: 7.64 K/UL (ref 3.9–12.7)

## 2021-04-14 PROCEDURE — 3078F PR MOST RECENT DIASTOLIC BLOOD PRESSURE < 80 MM HG: ICD-10-PCS | Mod: CPTII,S$GLB,, | Performed by: INTERNAL MEDICINE

## 2021-04-14 PROCEDURE — 1126F AMNT PAIN NOTED NONE PRSNT: CPT | Mod: S$GLB,,, | Performed by: INTERNAL MEDICINE

## 2021-04-14 PROCEDURE — 1101F PT FALLS ASSESS-DOCD LE1/YR: CPT | Mod: CPTII,S$GLB,, | Performed by: INTERNAL MEDICINE

## 2021-04-14 PROCEDURE — 3074F PR MOST RECENT SYSTOLIC BLOOD PRESSURE < 130 MM HG: ICD-10-PCS | Mod: CPTII,S$GLB,, | Performed by: INTERNAL MEDICINE

## 2021-04-14 PROCEDURE — 1159F MED LIST DOCD IN RCRD: CPT | Mod: S$GLB,,, | Performed by: INTERNAL MEDICINE

## 2021-04-14 PROCEDURE — 99999 PR PBB SHADOW E&M-EST. PATIENT-LVL IV: CPT | Mod: PBBFAC,,, | Performed by: INTERNAL MEDICINE

## 2021-04-14 PROCEDURE — 1101F PR PT FALLS ASSESS DOC 0-1 FALLS W/OUT INJ PAST YR: ICD-10-PCS | Mod: CPTII,S$GLB,, | Performed by: INTERNAL MEDICINE

## 2021-04-14 PROCEDURE — 1126F PR PAIN SEVERITY QUANTIFIED, NO PAIN PRESENT: ICD-10-PCS | Mod: S$GLB,,, | Performed by: INTERNAL MEDICINE

## 2021-04-14 PROCEDURE — 99213 OFFICE O/P EST LOW 20 MIN: CPT | Mod: S$GLB,,, | Performed by: INTERNAL MEDICINE

## 2021-04-14 PROCEDURE — 99499 RISK ADDL DX/OHS AUDIT: ICD-10-PCS | Mod: S$GLB,,, | Performed by: INTERNAL MEDICINE

## 2021-04-14 PROCEDURE — 80048 BASIC METABOLIC PNL TOTAL CA: CPT | Performed by: INTERNAL MEDICINE

## 2021-04-14 PROCEDURE — 36415 COLL VENOUS BLD VENIPUNCTURE: CPT | Performed by: INTERNAL MEDICINE

## 2021-04-14 PROCEDURE — 1159F PR MEDICATION LIST DOCUMENTED IN MEDICAL RECORD: ICD-10-PCS | Mod: S$GLB,,, | Performed by: INTERNAL MEDICINE

## 2021-04-14 PROCEDURE — 99499 UNLISTED E&M SERVICE: CPT | Mod: S$GLB,,, | Performed by: INTERNAL MEDICINE

## 2021-04-14 PROCEDURE — 3288F FALL RISK ASSESSMENT DOCD: CPT | Mod: CPTII,S$GLB,, | Performed by: INTERNAL MEDICINE

## 2021-04-14 PROCEDURE — 3288F PR FALLS RISK ASSESSMENT DOCUMENTED: ICD-10-PCS | Mod: CPTII,S$GLB,, | Performed by: INTERNAL MEDICINE

## 2021-04-14 PROCEDURE — 3074F SYST BP LT 130 MM HG: CPT | Mod: CPTII,S$GLB,, | Performed by: INTERNAL MEDICINE

## 2021-04-14 PROCEDURE — 99213 PR OFFICE/OUTPT VISIT, EST, LEVL III, 20-29 MIN: ICD-10-PCS | Mod: S$GLB,,, | Performed by: INTERNAL MEDICINE

## 2021-04-14 PROCEDURE — 85025 COMPLETE CBC W/AUTO DIFF WBC: CPT | Performed by: INTERNAL MEDICINE

## 2021-04-14 PROCEDURE — 3078F DIAST BP <80 MM HG: CPT | Mod: CPTII,S$GLB,, | Performed by: INTERNAL MEDICINE

## 2021-04-14 PROCEDURE — 99999 PR PBB SHADOW E&M-EST. PATIENT-LVL IV: ICD-10-PCS | Mod: PBBFAC,,, | Performed by: INTERNAL MEDICINE

## 2021-04-15 ENCOUNTER — OFFICE VISIT (OUTPATIENT)
Dept: INTERNAL MEDICINE | Facility: CLINIC | Age: 81
End: 2021-04-15
Payer: MEDICARE

## 2021-04-15 VITALS
DIASTOLIC BLOOD PRESSURE: 40 MMHG | OXYGEN SATURATION: 98 % | BODY MASS INDEX: 27.33 KG/M2 | HEIGHT: 64 IN | WEIGHT: 160.06 LBS | HEART RATE: 79 BPM | SYSTOLIC BLOOD PRESSURE: 110 MMHG

## 2021-04-15 DIAGNOSIS — E11.3213 CONTROLLED TYPE 2 DIABETES MELLITUS WITH BOTH EYES AFFECTED BY MILD NONPROLIFERATIVE RETINOPATHY AND MACULAR EDEMA, WITH LONG-TERM CURRENT USE OF INSULIN: ICD-10-CM

## 2021-04-15 DIAGNOSIS — F32.0 CURRENT MILD EPISODE OF MAJOR DEPRESSIVE DISORDER WITHOUT PRIOR EPISODE: ICD-10-CM

## 2021-04-15 DIAGNOSIS — I50.43 ACUTE ON CHRONIC COMBINED SYSTOLIC AND DIASTOLIC HEART FAILURE: ICD-10-CM

## 2021-04-15 DIAGNOSIS — E11.59 HYPERTENSION ASSOCIATED WITH DIABETES: Primary | ICD-10-CM

## 2021-04-15 DIAGNOSIS — N18.4 CKD (CHRONIC KIDNEY DISEASE) STAGE 4, GFR 15-29 ML/MIN: ICD-10-CM

## 2021-04-15 DIAGNOSIS — Z79.4 CONTROLLED TYPE 2 DIABETES MELLITUS WITH BOTH EYES AFFECTED BY MILD NONPROLIFERATIVE RETINOPATHY AND MACULAR EDEMA, WITH LONG-TERM CURRENT USE OF INSULIN: ICD-10-CM

## 2021-04-15 DIAGNOSIS — I15.2 HYPERTENSION ASSOCIATED WITH DIABETES: Primary | ICD-10-CM

## 2021-04-15 PROBLEM — J96.01 ACUTE HYPOXEMIC RESPIRATORY FAILURE: Status: RESOLVED | Noted: 2019-12-19 | Resolved: 2021-04-15

## 2021-04-15 PROCEDURE — 3078F DIAST BP <80 MM HG: CPT | Mod: CPTII,S$GLB,, | Performed by: INTERNAL MEDICINE

## 2021-04-15 PROCEDURE — 1101F PR PT FALLS ASSESS DOC 0-1 FALLS W/OUT INJ PAST YR: ICD-10-PCS | Mod: CPTII,S$GLB,, | Performed by: INTERNAL MEDICINE

## 2021-04-15 PROCEDURE — 99214 OFFICE O/P EST MOD 30 MIN: CPT | Mod: S$GLB,,, | Performed by: INTERNAL MEDICINE

## 2021-04-15 PROCEDURE — 99214 PR OFFICE/OUTPT VISIT, EST, LEVL IV, 30-39 MIN: ICD-10-PCS | Mod: S$GLB,,, | Performed by: INTERNAL MEDICINE

## 2021-04-15 PROCEDURE — 99999 PR PBB SHADOW E&M-EST. PATIENT-LVL IV: ICD-10-PCS | Mod: PBBFAC,,, | Performed by: INTERNAL MEDICINE

## 2021-04-15 PROCEDURE — 1159F PR MEDICATION LIST DOCUMENTED IN MEDICAL RECORD: ICD-10-PCS | Mod: S$GLB,,, | Performed by: INTERNAL MEDICINE

## 2021-04-15 PROCEDURE — 3288F PR FALLS RISK ASSESSMENT DOCUMENTED: ICD-10-PCS | Mod: CPTII,S$GLB,, | Performed by: INTERNAL MEDICINE

## 2021-04-15 PROCEDURE — 1126F AMNT PAIN NOTED NONE PRSNT: CPT | Mod: S$GLB,,, | Performed by: INTERNAL MEDICINE

## 2021-04-15 PROCEDURE — 1159F MED LIST DOCD IN RCRD: CPT | Mod: S$GLB,,, | Performed by: INTERNAL MEDICINE

## 2021-04-15 PROCEDURE — 3288F FALL RISK ASSESSMENT DOCD: CPT | Mod: CPTII,S$GLB,, | Performed by: INTERNAL MEDICINE

## 2021-04-15 PROCEDURE — 99999 PR PBB SHADOW E&M-EST. PATIENT-LVL IV: CPT | Mod: PBBFAC,,, | Performed by: INTERNAL MEDICINE

## 2021-04-15 PROCEDURE — 1126F PR PAIN SEVERITY QUANTIFIED, NO PAIN PRESENT: ICD-10-PCS | Mod: S$GLB,,, | Performed by: INTERNAL MEDICINE

## 2021-04-15 PROCEDURE — 1101F PT FALLS ASSESS-DOCD LE1/YR: CPT | Mod: CPTII,S$GLB,, | Performed by: INTERNAL MEDICINE

## 2021-04-15 PROCEDURE — 3078F PR MOST RECENT DIASTOLIC BLOOD PRESSURE < 80 MM HG: ICD-10-PCS | Mod: CPTII,S$GLB,, | Performed by: INTERNAL MEDICINE

## 2021-04-15 PROCEDURE — 3074F SYST BP LT 130 MM HG: CPT | Mod: CPTII,S$GLB,, | Performed by: INTERNAL MEDICINE

## 2021-04-15 PROCEDURE — 99499 UNLISTED E&M SERVICE: CPT | Mod: S$GLB,,, | Performed by: INTERNAL MEDICINE

## 2021-04-15 PROCEDURE — 3074F PR MOST RECENT SYSTOLIC BLOOD PRESSURE < 130 MM HG: ICD-10-PCS | Mod: CPTII,S$GLB,, | Performed by: INTERNAL MEDICINE

## 2021-04-15 PROCEDURE — 99499 RISK ADDL DX/OHS AUDIT: ICD-10-PCS | Mod: S$GLB,,, | Performed by: INTERNAL MEDICINE

## 2021-04-23 ENCOUNTER — CLINICAL SUPPORT (OUTPATIENT)
Dept: OTOLARYNGOLOGY | Facility: CLINIC | Age: 81
End: 2021-04-23
Payer: MEDICARE

## 2021-04-23 DIAGNOSIS — Z46.1 ENCOUNTER FOR FITTING AND ADJUSTMENT OF HEARING AID OF BOTH EARS: Primary | ICD-10-CM

## 2021-04-23 PROCEDURE — 99499 UNLISTED E&M SERVICE: CPT | Mod: S$GLB,,, | Performed by: AUDIOLOGIST-HEARING AID FITTER

## 2021-04-23 PROCEDURE — 99499 NO LOS: ICD-10-PCS | Mod: S$GLB,,, | Performed by: AUDIOLOGIST-HEARING AID FITTER

## 2021-04-29 ENCOUNTER — OFFICE VISIT (OUTPATIENT)
Dept: OPHTHALMOLOGY | Facility: CLINIC | Age: 81
End: 2021-04-29
Payer: MEDICARE

## 2021-04-29 DIAGNOSIS — Z79.4 CONTROLLED TYPE 2 DIABETES MELLITUS WITH BOTH EYES AFFECTED BY MILD NONPROLIFERATIVE RETINOPATHY AND MACULAR EDEMA, WITH LONG-TERM CURRENT USE OF INSULIN: ICD-10-CM

## 2021-04-29 DIAGNOSIS — E11.3213 CONTROLLED TYPE 2 DIABETES MELLITUS WITH BOTH EYES AFFECTED BY MILD NONPROLIFERATIVE RETINOPATHY AND MACULAR EDEMA, WITH LONG-TERM CURRENT USE OF INSULIN: ICD-10-CM

## 2021-04-29 PROCEDURE — 1126F AMNT PAIN NOTED NONE PRSNT: CPT | Mod: S$GLB,,, | Performed by: OPHTHALMOLOGY

## 2021-04-29 PROCEDURE — 3288F FALL RISK ASSESSMENT DOCD: CPT | Mod: CPTII,S$GLB,, | Performed by: OPHTHALMOLOGY

## 2021-04-29 PROCEDURE — 92134 POSTERIOR SEGMENT OCT RETINA (OCULAR COHERENCE TOMOGRAPHY)-BOTH EYES: ICD-10-PCS | Mod: S$GLB,,, | Performed by: OPHTHALMOLOGY

## 2021-04-29 PROCEDURE — 3288F PR FALLS RISK ASSESSMENT DOCUMENTED: ICD-10-PCS | Mod: CPTII,S$GLB,, | Performed by: OPHTHALMOLOGY

## 2021-04-29 PROCEDURE — 99999 PR PBB SHADOW E&M-EST. PATIENT-LVL IV: ICD-10-PCS | Mod: PBBFAC,,, | Performed by: OPHTHALMOLOGY

## 2021-04-29 PROCEDURE — 1101F PT FALLS ASSESS-DOCD LE1/YR: CPT | Mod: CPTII,S$GLB,, | Performed by: OPHTHALMOLOGY

## 2021-04-29 PROCEDURE — 92235 FLUORESCEIN ANGRPH MLTIFRAME: CPT | Mod: S$GLB,,, | Performed by: OPHTHALMOLOGY

## 2021-04-29 PROCEDURE — 92014 COMPRE OPH EXAM EST PT 1/>: CPT | Mod: S$GLB,,, | Performed by: OPHTHALMOLOGY

## 2021-04-29 PROCEDURE — 99999 PR PBB SHADOW E&M-EST. PATIENT-LVL IV: CPT | Mod: PBBFAC,,, | Performed by: OPHTHALMOLOGY

## 2021-04-29 PROCEDURE — 1126F PR PAIN SEVERITY QUANTIFIED, NO PAIN PRESENT: ICD-10-PCS | Mod: S$GLB,,, | Performed by: OPHTHALMOLOGY

## 2021-04-29 PROCEDURE — 92201 PR OPHTHALMOSCOPY, EXT, W/RET DRAW/SCLERAL DEPR, I&R, UNI/BI: ICD-10-PCS | Mod: S$GLB,,, | Performed by: OPHTHALMOLOGY

## 2021-04-29 PROCEDURE — 92235 FLUORESCEIN ANGIOGRAPHY - OU - BOTH EYES: ICD-10-PCS | Mod: S$GLB,,, | Performed by: OPHTHALMOLOGY

## 2021-04-29 PROCEDURE — 92201 OPSCPY EXTND RTA DRAW UNI/BI: CPT | Mod: S$GLB,,, | Performed by: OPHTHALMOLOGY

## 2021-04-29 PROCEDURE — 1101F PR PT FALLS ASSESS DOC 0-1 FALLS W/OUT INJ PAST YR: ICD-10-PCS | Mod: CPTII,S$GLB,, | Performed by: OPHTHALMOLOGY

## 2021-04-29 PROCEDURE — 92134 CPTRZ OPH DX IMG PST SGM RTA: CPT | Mod: S$GLB,,, | Performed by: OPHTHALMOLOGY

## 2021-04-29 PROCEDURE — 92014 PR EYE EXAM, EST PATIENT,COMPREHESV: ICD-10-PCS | Mod: S$GLB,,, | Performed by: OPHTHALMOLOGY

## 2021-04-29 RX ORDER — FLUORESCEIN 500 MG/ML
5 INJECTION INTRAVENOUS ONCE
Status: COMPLETED | OUTPATIENT
Start: 2021-04-29 | End: 2021-04-29

## 2021-04-29 RX ADMIN — FLUORESCEIN 500 MG: 500 INJECTION INTRAVENOUS at 11:04

## 2021-05-02 ENCOUNTER — NURSE TRIAGE (OUTPATIENT)
Dept: ADMINISTRATIVE | Facility: CLINIC | Age: 81
End: 2021-05-02

## 2021-05-02 DIAGNOSIS — E11.65 UNCONTROLLED TYPE 2 DIABETES MELLITUS WITH HYPERGLYCEMIA: ICD-10-CM

## 2021-05-07 ENCOUNTER — LAB VISIT (OUTPATIENT)
Dept: LAB | Facility: HOSPITAL | Age: 81
End: 2021-05-07
Attending: INTERNAL MEDICINE
Payer: MEDICARE

## 2021-05-07 DIAGNOSIS — N18.4 CKD (CHRONIC KIDNEY DISEASE) STAGE 4, GFR 15-29 ML/MIN: ICD-10-CM

## 2021-05-07 LAB
ALBUMIN SERPL BCP-MCNC: 3.3 G/DL (ref 3.5–5.2)
ANION GAP SERPL CALC-SCNC: 11 MMOL/L (ref 8–16)
BUN SERPL-MCNC: 47 MG/DL (ref 8–23)
CALCIUM SERPL-MCNC: 9.2 MG/DL (ref 8.7–10.5)
CHLORIDE SERPL-SCNC: 102 MMOL/L (ref 95–110)
CO2 SERPL-SCNC: 23 MMOL/L (ref 23–29)
CREAT SERPL-MCNC: 2 MG/DL (ref 0.5–1.4)
EST. GFR  (AFRICAN AMERICAN): 26.2 ML/MIN/1.73 M^2
EST. GFR  (NON AFRICAN AMERICAN): 22.7 ML/MIN/1.73 M^2
GLUCOSE SERPL-MCNC: 102 MG/DL (ref 70–110)
PHOSPHATE SERPL-MCNC: 5 MG/DL (ref 2.7–4.5)
POTASSIUM SERPL-SCNC: 4.7 MMOL/L (ref 3.5–5.1)
SODIUM SERPL-SCNC: 136 MMOL/L (ref 136–145)

## 2021-05-07 PROCEDURE — 36415 COLL VENOUS BLD VENIPUNCTURE: CPT | Mod: PO | Performed by: INTERNAL MEDICINE

## 2021-05-07 PROCEDURE — 80069 RENAL FUNCTION PANEL: CPT | Performed by: INTERNAL MEDICINE

## 2021-05-10 ENCOUNTER — TELEPHONE (OUTPATIENT)
Dept: INTERNAL MEDICINE | Facility: CLINIC | Age: 81
End: 2021-05-10

## 2021-05-10 ENCOUNTER — PATIENT MESSAGE (OUTPATIENT)
Dept: INTERNAL MEDICINE | Facility: CLINIC | Age: 81
End: 2021-05-10

## 2021-05-10 ENCOUNTER — OFFICE VISIT (OUTPATIENT)
Dept: INTERNAL MEDICINE | Facility: CLINIC | Age: 81
End: 2021-05-10
Payer: MEDICARE

## 2021-05-10 DIAGNOSIS — N18.4 CKD (CHRONIC KIDNEY DISEASE) STAGE 4, GFR 15-29 ML/MIN: Primary | ICD-10-CM

## 2021-05-10 DIAGNOSIS — E11.3213 CONTROLLED TYPE 2 DIABETES MELLITUS WITH BOTH EYES AFFECTED BY MILD NONPROLIFERATIVE RETINOPATHY AND MACULAR EDEMA, WITH LONG-TERM CURRENT USE OF INSULIN: ICD-10-CM

## 2021-05-10 DIAGNOSIS — I10 ESSENTIAL HYPERTENSION: Primary | ICD-10-CM

## 2021-05-10 DIAGNOSIS — I15.2 HYPERTENSION ASSOCIATED WITH DIABETES: ICD-10-CM

## 2021-05-10 DIAGNOSIS — N18.4 CKD (CHRONIC KIDNEY DISEASE) STAGE 4, GFR 15-29 ML/MIN: ICD-10-CM

## 2021-05-10 DIAGNOSIS — E11.59 HYPERTENSION ASSOCIATED WITH DIABETES: ICD-10-CM

## 2021-05-10 DIAGNOSIS — Z79.4 CONTROLLED TYPE 2 DIABETES MELLITUS WITH BOTH EYES AFFECTED BY MILD NONPROLIFERATIVE RETINOPATHY AND MACULAR EDEMA, WITH LONG-TERM CURRENT USE OF INSULIN: ICD-10-CM

## 2021-05-10 PROCEDURE — 99442 PR PHYSICIAN TELEPHONE EVALUATION 11-20 MIN: CPT | Mod: 95,,, | Performed by: INTERNAL MEDICINE

## 2021-05-10 PROCEDURE — 99442 PR PHYSICIAN TELEPHONE EVALUATION 11-20 MIN: ICD-10-PCS | Mod: 95,,, | Performed by: INTERNAL MEDICINE

## 2021-05-13 ENCOUNTER — CLINICAL SUPPORT (OUTPATIENT)
Dept: CARDIOLOGY | Facility: HOSPITAL | Age: 81
End: 2021-05-13
Payer: MEDICARE

## 2021-05-13 DIAGNOSIS — Z95.810 PRESENCE OF AUTOMATIC (IMPLANTABLE) CARDIAC DEFIBRILLATOR: ICD-10-CM

## 2021-05-13 PROCEDURE — 93295 CARDIAC DEVICE CHECK - REMOTE: ICD-10-PCS | Mod: ,,, | Performed by: INTERNAL MEDICINE

## 2021-05-13 PROCEDURE — 93296 REM INTERROG EVL PM/IDS: CPT | Performed by: INTERNAL MEDICINE

## 2021-05-13 PROCEDURE — 93295 DEV INTERROG REMOTE 1/2/MLT: CPT | Mod: ,,, | Performed by: INTERNAL MEDICINE

## 2021-05-21 ENCOUNTER — TELEPHONE (OUTPATIENT)
Dept: OTOLARYNGOLOGY | Facility: CLINIC | Age: 81
End: 2021-05-21

## 2021-05-28 ENCOUNTER — CLINICAL SUPPORT (OUTPATIENT)
Dept: OTOLARYNGOLOGY | Facility: CLINIC | Age: 81
End: 2021-05-28
Payer: MEDICARE

## 2021-05-28 DIAGNOSIS — Z46.1 ENCOUNTER FOR EARMOLD IMPRESSION FOR HEARING AID: Primary | ICD-10-CM

## 2021-05-28 PROCEDURE — 99499 UNLISTED E&M SERVICE: CPT | Mod: S$GLB,,, | Performed by: AUDIOLOGIST-HEARING AID FITTER

## 2021-05-28 PROCEDURE — 99499 NO LOS: ICD-10-PCS | Mod: S$GLB,,, | Performed by: AUDIOLOGIST-HEARING AID FITTER

## 2021-06-02 ENCOUNTER — OFFICE VISIT (OUTPATIENT)
Dept: ENDOCRINOLOGY | Facility: CLINIC | Age: 81
End: 2021-06-02
Payer: MEDICARE

## 2021-06-02 ENCOUNTER — OFFICE VISIT (OUTPATIENT)
Dept: INTERNAL MEDICINE | Facility: CLINIC | Age: 81
End: 2021-06-02
Payer: MEDICARE

## 2021-06-02 ENCOUNTER — TELEPHONE (OUTPATIENT)
Dept: INTERNAL MEDICINE | Facility: CLINIC | Age: 81
End: 2021-06-02

## 2021-06-02 ENCOUNTER — TELEPHONE (OUTPATIENT)
Dept: ENDOCRINOLOGY | Facility: CLINIC | Age: 81
End: 2021-06-02

## 2021-06-02 ENCOUNTER — HOSPITAL ENCOUNTER (OUTPATIENT)
Dept: RADIOLOGY | Facility: HOSPITAL | Age: 81
Discharge: HOME | End: 2021-06-02
Attending: INTERNAL MEDICINE
Payer: MEDICARE

## 2021-06-02 VITALS
WEIGHT: 163.38 LBS | HEIGHT: 63 IN | OXYGEN SATURATION: 93 % | BODY MASS INDEX: 28.95 KG/M2 | DIASTOLIC BLOOD PRESSURE: 44 MMHG | HEART RATE: 70 BPM | SYSTOLIC BLOOD PRESSURE: 120 MMHG

## 2021-06-02 VITALS
BODY MASS INDEX: 28.93 KG/M2 | RESPIRATION RATE: 22 BRPM | SYSTOLIC BLOOD PRESSURE: 142 MMHG | WEIGHT: 163.25 LBS | HEART RATE: 82 BPM | HEIGHT: 63 IN | DIASTOLIC BLOOD PRESSURE: 60 MMHG

## 2021-06-02 DIAGNOSIS — M81.0 OSTEOPOROSIS, UNSPECIFIED OSTEOPOROSIS TYPE, UNSPECIFIED PATHOLOGICAL FRACTURE PRESENCE: ICD-10-CM

## 2021-06-02 DIAGNOSIS — I50.42 CHRONIC COMBINED SYSTOLIC AND DIASTOLIC HEART FAILURE: ICD-10-CM

## 2021-06-02 DIAGNOSIS — N18.4 CKD (CHRONIC KIDNEY DISEASE) STAGE 4, GFR 15-29 ML/MIN: ICD-10-CM

## 2021-06-02 DIAGNOSIS — E04.2 NONTOXIC MULTINODULAR GOITER: ICD-10-CM

## 2021-06-02 DIAGNOSIS — E11.3213 CONTROLLED TYPE 2 DIABETES MELLITUS WITH BOTH EYES AFFECTED BY MILD NONPROLIFERATIVE RETINOPATHY AND MACULAR EDEMA, WITH LONG-TERM CURRENT USE OF INSULIN: Primary | ICD-10-CM

## 2021-06-02 DIAGNOSIS — R06.02 SHORTNESS OF BREATH: ICD-10-CM

## 2021-06-02 DIAGNOSIS — R06.02 SOB (SHORTNESS OF BREATH): Primary | ICD-10-CM

## 2021-06-02 DIAGNOSIS — E27.8 ADRENAL CORTICAL NODULE: ICD-10-CM

## 2021-06-02 DIAGNOSIS — R06.02 SOB (SHORTNESS OF BREATH): ICD-10-CM

## 2021-06-02 DIAGNOSIS — E78.5 HYPERLIPIDEMIA ASSOCIATED WITH TYPE 2 DIABETES MELLITUS: ICD-10-CM

## 2021-06-02 DIAGNOSIS — I15.2 HYPERTENSION ASSOCIATED WITH DIABETES: ICD-10-CM

## 2021-06-02 DIAGNOSIS — E11.42 DIABETIC POLYNEUROPATHY ASSOCIATED WITH TYPE 2 DIABETES MELLITUS: ICD-10-CM

## 2021-06-02 DIAGNOSIS — Z79.4 CONTROLLED TYPE 2 DIABETES MELLITUS WITH BOTH EYES AFFECTED BY MILD NONPROLIFERATIVE RETINOPATHY AND MACULAR EDEMA, WITH LONG-TERM CURRENT USE OF INSULIN: Primary | ICD-10-CM

## 2021-06-02 DIAGNOSIS — E11.59 HYPERTENSION ASSOCIATED WITH DIABETES: ICD-10-CM

## 2021-06-02 DIAGNOSIS — E11.69 HYPERLIPIDEMIA ASSOCIATED WITH TYPE 2 DIABETES MELLITUS: ICD-10-CM

## 2021-06-02 PROCEDURE — 99999 PR PBB SHADOW E&M-EST. PATIENT-LVL V: CPT | Mod: PBBFAC,,, | Performed by: INTERNAL MEDICINE

## 2021-06-02 PROCEDURE — 3288F PR FALLS RISK ASSESSMENT DOCUMENTED: ICD-10-PCS | Mod: CPTII,S$GLB,, | Performed by: INTERNAL MEDICINE

## 2021-06-02 PROCEDURE — 1126F AMNT PAIN NOTED NONE PRSNT: CPT | Mod: S$GLB,,, | Performed by: INTERNAL MEDICINE

## 2021-06-02 PROCEDURE — 99499 UNLISTED E&M SERVICE: CPT | Mod: S$GLB,,, | Performed by: NURSE PRACTITIONER

## 2021-06-02 PROCEDURE — 1126F AMNT PAIN NOTED NONE PRSNT: CPT | Mod: S$GLB,,, | Performed by: NURSE PRACTITIONER

## 2021-06-02 PROCEDURE — 71046 X-RAY EXAM CHEST 2 VIEWS: CPT | Mod: 26,,, | Performed by: RADIOLOGY

## 2021-06-02 PROCEDURE — 1101F PR PT FALLS ASSESS DOC 0-1 FALLS W/OUT INJ PAST YR: ICD-10-PCS | Mod: CPTII,S$GLB,, | Performed by: INTERNAL MEDICINE

## 2021-06-02 PROCEDURE — 1101F PT FALLS ASSESS-DOCD LE1/YR: CPT | Mod: CPTII,S$GLB,, | Performed by: NURSE PRACTITIONER

## 2021-06-02 PROCEDURE — 99499 RISK ADDL DX/OHS AUDIT: ICD-10-PCS | Mod: S$GLB,,, | Performed by: NURSE PRACTITIONER

## 2021-06-02 PROCEDURE — 1159F PR MEDICATION LIST DOCUMENTED IN MEDICAL RECORD: ICD-10-PCS | Mod: S$GLB,,, | Performed by: NURSE PRACTITIONER

## 2021-06-02 PROCEDURE — 99999 PR PBB SHADOW E&M-EST. PATIENT-LVL III: CPT | Mod: PBBFAC,,, | Performed by: NURSE PRACTITIONER

## 2021-06-02 PROCEDURE — 1126F PR PAIN SEVERITY QUANTIFIED, NO PAIN PRESENT: ICD-10-PCS | Mod: S$GLB,,, | Performed by: NURSE PRACTITIONER

## 2021-06-02 PROCEDURE — 99999 PR PBB SHADOW E&M-EST. PATIENT-LVL III: ICD-10-PCS | Mod: PBBFAC,,, | Performed by: NURSE PRACTITIONER

## 2021-06-02 PROCEDURE — 71046 X-RAY EXAM CHEST 2 VIEWS: CPT | Mod: TC

## 2021-06-02 PROCEDURE — 1159F PR MEDICATION LIST DOCUMENTED IN MEDICAL RECORD: ICD-10-PCS | Mod: S$GLB,,, | Performed by: INTERNAL MEDICINE

## 2021-06-02 PROCEDURE — 99999 PR PBB SHADOW E&M-EST. PATIENT-LVL V: ICD-10-PCS | Mod: PBBFAC,,, | Performed by: INTERNAL MEDICINE

## 2021-06-02 PROCEDURE — 3288F FALL RISK ASSESSMENT DOCD: CPT | Mod: CPTII,S$GLB,, | Performed by: NURSE PRACTITIONER

## 2021-06-02 PROCEDURE — 1101F PR PT FALLS ASSESS DOC 0-1 FALLS W/OUT INJ PAST YR: ICD-10-PCS | Mod: CPTII,S$GLB,, | Performed by: NURSE PRACTITIONER

## 2021-06-02 PROCEDURE — 3288F FALL RISK ASSESSMENT DOCD: CPT | Mod: CPTII,S$GLB,, | Performed by: INTERNAL MEDICINE

## 2021-06-02 PROCEDURE — 99214 OFFICE O/P EST MOD 30 MIN: CPT | Mod: S$GLB,,, | Performed by: NURSE PRACTITIONER

## 2021-06-02 PROCEDURE — 1159F MED LIST DOCD IN RCRD: CPT | Mod: S$GLB,,, | Performed by: INTERNAL MEDICINE

## 2021-06-02 PROCEDURE — 1159F MED LIST DOCD IN RCRD: CPT | Mod: S$GLB,,, | Performed by: NURSE PRACTITIONER

## 2021-06-02 PROCEDURE — 99214 OFFICE O/P EST MOD 30 MIN: CPT | Mod: S$GLB,,, | Performed by: INTERNAL MEDICINE

## 2021-06-02 PROCEDURE — 71046 XR CHEST PA AND LATERAL: ICD-10-PCS | Mod: 26,,, | Performed by: RADIOLOGY

## 2021-06-02 PROCEDURE — 99214 PR OFFICE/OUTPT VISIT, EST, LEVL IV, 30-39 MIN: ICD-10-PCS | Mod: S$GLB,,, | Performed by: INTERNAL MEDICINE

## 2021-06-02 PROCEDURE — 1126F PR PAIN SEVERITY QUANTIFIED, NO PAIN PRESENT: ICD-10-PCS | Mod: S$GLB,,, | Performed by: INTERNAL MEDICINE

## 2021-06-02 PROCEDURE — 1101F PT FALLS ASSESS-DOCD LE1/YR: CPT | Mod: CPTII,S$GLB,, | Performed by: INTERNAL MEDICINE

## 2021-06-02 PROCEDURE — 99214 PR OFFICE/OUTPT VISIT, EST, LEVL IV, 30-39 MIN: ICD-10-PCS | Mod: S$GLB,,, | Performed by: NURSE PRACTITIONER

## 2021-06-02 PROCEDURE — 3288F PR FALLS RISK ASSESSMENT DOCUMENTED: ICD-10-PCS | Mod: CPTII,S$GLB,, | Performed by: NURSE PRACTITIONER

## 2021-06-02 RX ORDER — FUROSEMIDE 40 MG/1
TABLET ORAL
Qty: 10 TABLET | Refills: 0 | Status: SHIPPED | OUTPATIENT
Start: 2021-06-02 | End: 2021-09-17

## 2021-06-03 ENCOUNTER — TELEPHONE (OUTPATIENT)
Dept: INTERNAL MEDICINE | Facility: CLINIC | Age: 81
End: 2021-06-03

## 2021-06-07 ENCOUNTER — TELEPHONE (OUTPATIENT)
Dept: ENDOCRINOLOGY | Facility: CLINIC | Age: 81
End: 2021-06-07

## 2021-06-10 ENCOUNTER — OFFICE VISIT (OUTPATIENT)
Dept: INTERNAL MEDICINE | Facility: CLINIC | Age: 81
End: 2021-06-10
Payer: MEDICARE

## 2021-06-10 VITALS
HEIGHT: 63 IN | OXYGEN SATURATION: 94 % | SYSTOLIC BLOOD PRESSURE: 124 MMHG | BODY MASS INDEX: 27.89 KG/M2 | HEART RATE: 83 BPM | DIASTOLIC BLOOD PRESSURE: 50 MMHG | WEIGHT: 157.44 LBS

## 2021-06-10 DIAGNOSIS — Z79.4 CONTROLLED TYPE 2 DIABETES MELLITUS WITH BOTH EYES AFFECTED BY MILD NONPROLIFERATIVE RETINOPATHY AND MACULAR EDEMA, WITH LONG-TERM CURRENT USE OF INSULIN: ICD-10-CM

## 2021-06-10 DIAGNOSIS — N18.4 CKD (CHRONIC KIDNEY DISEASE) STAGE 4, GFR 15-29 ML/MIN: ICD-10-CM

## 2021-06-10 DIAGNOSIS — I50.43 ACUTE ON CHRONIC COMBINED SYSTOLIC AND DIASTOLIC HEART FAILURE: Primary | ICD-10-CM

## 2021-06-10 DIAGNOSIS — E11.3213 CONTROLLED TYPE 2 DIABETES MELLITUS WITH BOTH EYES AFFECTED BY MILD NONPROLIFERATIVE RETINOPATHY AND MACULAR EDEMA, WITH LONG-TERM CURRENT USE OF INSULIN: ICD-10-CM

## 2021-06-10 DIAGNOSIS — J45.30 MILD PERSISTENT CHRONIC ASTHMA WITHOUT COMPLICATION: ICD-10-CM

## 2021-06-10 PROCEDURE — 99214 OFFICE O/P EST MOD 30 MIN: CPT | Mod: S$GLB,,, | Performed by: INTERNAL MEDICINE

## 2021-06-10 PROCEDURE — 99999 PR PBB SHADOW E&M-EST. PATIENT-LVL III: ICD-10-PCS | Mod: PBBFAC,,, | Performed by: INTERNAL MEDICINE

## 2021-06-10 PROCEDURE — 1159F MED LIST DOCD IN RCRD: CPT | Mod: S$GLB,,, | Performed by: INTERNAL MEDICINE

## 2021-06-10 PROCEDURE — 1126F AMNT PAIN NOTED NONE PRSNT: CPT | Mod: S$GLB,,, | Performed by: INTERNAL MEDICINE

## 2021-06-10 PROCEDURE — 3288F FALL RISK ASSESSMENT DOCD: CPT | Mod: CPTII,S$GLB,, | Performed by: INTERNAL MEDICINE

## 2021-06-10 PROCEDURE — 1101F PR PT FALLS ASSESS DOC 0-1 FALLS W/OUT INJ PAST YR: ICD-10-PCS | Mod: CPTII,S$GLB,, | Performed by: INTERNAL MEDICINE

## 2021-06-10 PROCEDURE — 1126F PR PAIN SEVERITY QUANTIFIED, NO PAIN PRESENT: ICD-10-PCS | Mod: S$GLB,,, | Performed by: INTERNAL MEDICINE

## 2021-06-10 PROCEDURE — 3288F PR FALLS RISK ASSESSMENT DOCUMENTED: ICD-10-PCS | Mod: CPTII,S$GLB,, | Performed by: INTERNAL MEDICINE

## 2021-06-10 PROCEDURE — 99214 PR OFFICE/OUTPT VISIT, EST, LEVL IV, 30-39 MIN: ICD-10-PCS | Mod: S$GLB,,, | Performed by: INTERNAL MEDICINE

## 2021-06-10 PROCEDURE — 1101F PT FALLS ASSESS-DOCD LE1/YR: CPT | Mod: CPTII,S$GLB,, | Performed by: INTERNAL MEDICINE

## 2021-06-10 PROCEDURE — 99999 PR PBB SHADOW E&M-EST. PATIENT-LVL III: CPT | Mod: PBBFAC,,, | Performed by: INTERNAL MEDICINE

## 2021-06-10 PROCEDURE — 1159F PR MEDICATION LIST DOCUMENTED IN MEDICAL RECORD: ICD-10-PCS | Mod: S$GLB,,, | Performed by: INTERNAL MEDICINE

## 2021-06-10 RX ORDER — DOXYCYCLINE HYCLATE 100 MG
100 TABLET ORAL 2 TIMES DAILY
Qty: 14 TABLET | Refills: 0 | Status: SHIPPED | OUTPATIENT
Start: 2021-06-10 | End: 2021-07-13

## 2021-06-10 RX ORDER — PREDNISONE 10 MG/1
10 TABLET ORAL DAILY
Qty: 7 TABLET | Refills: 0 | Status: SHIPPED | OUTPATIENT
Start: 2021-06-10 | End: 2021-07-13 | Stop reason: SDUPTHER

## 2021-06-11 PROBLEM — N17.9 AKI (ACUTE KIDNEY INJURY): Status: RESOLVED | Noted: 2019-12-07 | Resolved: 2021-06-11

## 2021-06-15 ENCOUNTER — CLINICAL SUPPORT (OUTPATIENT)
Dept: OTOLARYNGOLOGY | Facility: CLINIC | Age: 81
End: 2021-06-15
Payer: MEDICARE

## 2021-06-15 DIAGNOSIS — Z71.89 ENCOUNTER FOR HEARING AID CONSULTATION: Primary | ICD-10-CM

## 2021-06-15 PROCEDURE — 99499 NO LOS: ICD-10-PCS | Mod: S$GLB,,, | Performed by: AUDIOLOGIST-HEARING AID FITTER

## 2021-06-15 PROCEDURE — 99499 UNLISTED E&M SERVICE: CPT | Mod: S$GLB,,, | Performed by: AUDIOLOGIST-HEARING AID FITTER

## 2021-06-21 DIAGNOSIS — I42.8 NONISCHEMIC CARDIOMYOPATHY: ICD-10-CM

## 2021-06-21 DIAGNOSIS — I10 ESSENTIAL HYPERTENSION: ICD-10-CM

## 2021-06-21 RX ORDER — IRBESARTAN 300 MG/1
300 TABLET ORAL NIGHTLY
Qty: 90 TABLET | Refills: 3 | Status: SHIPPED | OUTPATIENT
Start: 2021-06-21 | End: 2022-04-12 | Stop reason: ALTCHOICE

## 2021-06-29 ENCOUNTER — CLINICAL SUPPORT (OUTPATIENT)
Dept: OTOLARYNGOLOGY | Facility: CLINIC | Age: 81
End: 2021-06-29
Payer: MEDICARE

## 2021-06-29 DIAGNOSIS — Z46.1 ENCOUNTER FOR FITTING AND ADJUSTMENT OF HEARING AID OF BOTH EARS: Primary | ICD-10-CM

## 2021-06-29 PROCEDURE — 99499 UNLISTED E&M SERVICE: CPT | Mod: S$GLB,,, | Performed by: AUDIOLOGIST-HEARING AID FITTER

## 2021-06-29 PROCEDURE — 99499 NO LOS: ICD-10-PCS | Mod: S$GLB,,, | Performed by: AUDIOLOGIST-HEARING AID FITTER

## 2021-07-09 ENCOUNTER — TELEPHONE (OUTPATIENT)
Dept: NEPHROLOGY | Facility: CLINIC | Age: 81
End: 2021-07-09

## 2021-07-12 ENCOUNTER — TELEPHONE (OUTPATIENT)
Dept: NEPHROLOGY | Facility: CLINIC | Age: 81
End: 2021-07-12

## 2021-07-13 ENCOUNTER — OFFICE VISIT (OUTPATIENT)
Dept: INTERNAL MEDICINE | Facility: CLINIC | Age: 81
End: 2021-07-13
Payer: MEDICARE

## 2021-07-13 ENCOUNTER — LAB VISIT (OUTPATIENT)
Dept: LAB | Facility: HOSPITAL | Age: 81
End: 2021-07-13
Attending: INTERNAL MEDICINE
Payer: MEDICARE

## 2021-07-13 ENCOUNTER — PATIENT OUTREACH (OUTPATIENT)
Dept: ADMINISTRATIVE | Facility: OTHER | Age: 81
End: 2021-07-13

## 2021-07-13 VITALS
HEART RATE: 80 BPM | OXYGEN SATURATION: 99 % | SYSTOLIC BLOOD PRESSURE: 138 MMHG | BODY MASS INDEX: 28.39 KG/M2 | HEIGHT: 63 IN | WEIGHT: 160.25 LBS | DIASTOLIC BLOOD PRESSURE: 52 MMHG

## 2021-07-13 DIAGNOSIS — Z79.4 CONTROLLED TYPE 2 DIABETES MELLITUS WITH BOTH EYES AFFECTED BY MILD NONPROLIFERATIVE RETINOPATHY AND MACULAR EDEMA, WITH LONG-TERM CURRENT USE OF INSULIN: ICD-10-CM

## 2021-07-13 DIAGNOSIS — E11.3213 CONTROLLED TYPE 2 DIABETES MELLITUS WITH BOTH EYES AFFECTED BY MILD NONPROLIFERATIVE RETINOPATHY AND MACULAR EDEMA, WITH LONG-TERM CURRENT USE OF INSULIN: ICD-10-CM

## 2021-07-13 DIAGNOSIS — I10 ESSENTIAL HYPERTENSION: ICD-10-CM

## 2021-07-13 DIAGNOSIS — I50.42 CHRONIC COMBINED SYSTOLIC AND DIASTOLIC HEART FAILURE: ICD-10-CM

## 2021-07-13 DIAGNOSIS — N18.32 STAGE 3B CHRONIC KIDNEY DISEASE: ICD-10-CM

## 2021-07-13 DIAGNOSIS — I50.42 CHRONIC COMBINED SYSTOLIC AND DIASTOLIC HEART FAILURE: Primary | ICD-10-CM

## 2021-07-13 LAB
ANION GAP SERPL CALC-SCNC: 10 MMOL/L (ref 8–16)
BASOPHILS # BLD AUTO: 0.05 K/UL (ref 0–0.2)
BASOPHILS NFR BLD: 0.6 % (ref 0–1.9)
BNP SERPL-MCNC: 345 PG/ML (ref 0–99)
BUN SERPL-MCNC: 51 MG/DL (ref 8–23)
CALCIUM SERPL-MCNC: 9.1 MG/DL (ref 8.7–10.5)
CHLORIDE SERPL-SCNC: 102 MMOL/L (ref 95–110)
CO2 SERPL-SCNC: 23 MMOL/L (ref 23–29)
CREAT SERPL-MCNC: 2.3 MG/DL (ref 0.5–1.4)
DIFFERENTIAL METHOD: ABNORMAL
EOSINOPHIL # BLD AUTO: 0.3 K/UL (ref 0–0.5)
EOSINOPHIL NFR BLD: 3.4 % (ref 0–8)
ERYTHROCYTE [DISTWIDTH] IN BLOOD BY AUTOMATED COUNT: 12.6 % (ref 11.5–14.5)
EST. GFR  (AFRICAN AMERICAN): 22.1 ML/MIN/1.73 M^2
EST. GFR  (NON AFRICAN AMERICAN): 19.2 ML/MIN/1.73 M^2
GLUCOSE SERPL-MCNC: 131 MG/DL (ref 70–110)
HCT VFR BLD AUTO: 31.9 % (ref 37–48.5)
HGB BLD-MCNC: 10.2 G/DL (ref 12–16)
IMM GRANULOCYTES # BLD AUTO: 0.07 K/UL (ref 0–0.04)
IMM GRANULOCYTES NFR BLD AUTO: 0.9 % (ref 0–0.5)
LYMPHOCYTES # BLD AUTO: 1.2 K/UL (ref 1–4.8)
LYMPHOCYTES NFR BLD: 14.9 % (ref 18–48)
MCH RBC QN AUTO: 30.3 PG (ref 27–31)
MCHC RBC AUTO-ENTMCNC: 32 G/DL (ref 32–36)
MCV RBC AUTO: 95 FL (ref 82–98)
MONOCYTES # BLD AUTO: 0.6 K/UL (ref 0.3–1)
MONOCYTES NFR BLD: 7.1 % (ref 4–15)
NEUTROPHILS # BLD AUTO: 5.7 K/UL (ref 1.8–7.7)
NEUTROPHILS NFR BLD: 73.1 % (ref 38–73)
NRBC BLD-RTO: 0 /100 WBC
PLATELET # BLD AUTO: 221 K/UL (ref 150–450)
PMV BLD AUTO: 11.4 FL (ref 9.2–12.9)
POTASSIUM SERPL-SCNC: 4.6 MMOL/L (ref 3.5–5.1)
RBC # BLD AUTO: 3.37 M/UL (ref 4–5.4)
SODIUM SERPL-SCNC: 135 MMOL/L (ref 136–145)
WBC # BLD AUTO: 7.86 K/UL (ref 3.9–12.7)

## 2021-07-13 PROCEDURE — 1126F AMNT PAIN NOTED NONE PRSNT: CPT | Mod: S$GLB,,, | Performed by: INTERNAL MEDICINE

## 2021-07-13 PROCEDURE — 1101F PR PT FALLS ASSESS DOC 0-1 FALLS W/OUT INJ PAST YR: ICD-10-PCS | Mod: CPTII,S$GLB,, | Performed by: INTERNAL MEDICINE

## 2021-07-13 PROCEDURE — 3075F PR MOST RECENT SYSTOLIC BLOOD PRESS GE 130-139MM HG: ICD-10-PCS | Mod: CPTII,S$GLB,, | Performed by: INTERNAL MEDICINE

## 2021-07-13 PROCEDURE — 99214 OFFICE O/P EST MOD 30 MIN: CPT | Mod: S$GLB,,, | Performed by: INTERNAL MEDICINE

## 2021-07-13 PROCEDURE — 99499 UNLISTED E&M SERVICE: CPT | Mod: S$GLB,,, | Performed by: INTERNAL MEDICINE

## 2021-07-13 PROCEDURE — 99214 PR OFFICE/OUTPT VISIT, EST, LEVL IV, 30-39 MIN: ICD-10-PCS | Mod: S$GLB,,, | Performed by: INTERNAL MEDICINE

## 2021-07-13 PROCEDURE — 99999 PR PBB SHADOW E&M-EST. PATIENT-LVL III: CPT | Mod: PBBFAC,,, | Performed by: INTERNAL MEDICINE

## 2021-07-13 PROCEDURE — 1159F PR MEDICATION LIST DOCUMENTED IN MEDICAL RECORD: ICD-10-PCS | Mod: S$GLB,,, | Performed by: INTERNAL MEDICINE

## 2021-07-13 PROCEDURE — 3288F FALL RISK ASSESSMENT DOCD: CPT | Mod: CPTII,S$GLB,, | Performed by: INTERNAL MEDICINE

## 2021-07-13 PROCEDURE — 83880 ASSAY OF NATRIURETIC PEPTIDE: CPT | Performed by: INTERNAL MEDICINE

## 2021-07-13 PROCEDURE — 85025 COMPLETE CBC W/AUTO DIFF WBC: CPT | Performed by: INTERNAL MEDICINE

## 2021-07-13 PROCEDURE — 99499 RISK ADDL DX/OHS AUDIT: ICD-10-PCS | Mod: S$GLB,,, | Performed by: INTERNAL MEDICINE

## 2021-07-13 PROCEDURE — 1126F PR PAIN SEVERITY QUANTIFIED, NO PAIN PRESENT: ICD-10-PCS | Mod: S$GLB,,, | Performed by: INTERNAL MEDICINE

## 2021-07-13 PROCEDURE — 36415 COLL VENOUS BLD VENIPUNCTURE: CPT | Performed by: INTERNAL MEDICINE

## 2021-07-13 PROCEDURE — 3078F PR MOST RECENT DIASTOLIC BLOOD PRESSURE < 80 MM HG: ICD-10-PCS | Mod: CPTII,S$GLB,, | Performed by: INTERNAL MEDICINE

## 2021-07-13 PROCEDURE — 1101F PT FALLS ASSESS-DOCD LE1/YR: CPT | Mod: CPTII,S$GLB,, | Performed by: INTERNAL MEDICINE

## 2021-07-13 PROCEDURE — 3288F PR FALLS RISK ASSESSMENT DOCUMENTED: ICD-10-PCS | Mod: CPTII,S$GLB,, | Performed by: INTERNAL MEDICINE

## 2021-07-13 PROCEDURE — 1159F MED LIST DOCD IN RCRD: CPT | Mod: S$GLB,,, | Performed by: INTERNAL MEDICINE

## 2021-07-13 PROCEDURE — 80048 BASIC METABOLIC PNL TOTAL CA: CPT | Performed by: INTERNAL MEDICINE

## 2021-07-13 PROCEDURE — 3078F DIAST BP <80 MM HG: CPT | Mod: CPTII,S$GLB,, | Performed by: INTERNAL MEDICINE

## 2021-07-13 PROCEDURE — 99999 PR PBB SHADOW E&M-EST. PATIENT-LVL III: ICD-10-PCS | Mod: PBBFAC,,, | Performed by: INTERNAL MEDICINE

## 2021-07-13 PROCEDURE — 3075F SYST BP GE 130 - 139MM HG: CPT | Mod: CPTII,S$GLB,, | Performed by: INTERNAL MEDICINE

## 2021-07-13 RX ORDER — DOXYCYCLINE HYCLATE 100 MG
100 TABLET ORAL 2 TIMES DAILY
Qty: 14 TABLET | Refills: 0 | Status: SHIPPED | OUTPATIENT
Start: 2021-07-13 | End: 2021-08-09 | Stop reason: ALTCHOICE

## 2021-07-13 RX ORDER — PREDNISONE 10 MG/1
10 TABLET ORAL DAILY
Qty: 7 TABLET | Refills: 0 | Status: SHIPPED | OUTPATIENT
Start: 2021-07-13 | End: 2021-07-23 | Stop reason: SDUPTHER

## 2021-07-14 ENCOUNTER — OFFICE VISIT (OUTPATIENT)
Dept: NEPHROLOGY | Facility: CLINIC | Age: 81
End: 2021-07-14
Payer: MEDICARE

## 2021-07-14 VITALS
HEIGHT: 63 IN | OXYGEN SATURATION: 96 % | SYSTOLIC BLOOD PRESSURE: 144 MMHG | WEIGHT: 158.06 LBS | HEART RATE: 84 BPM | DIASTOLIC BLOOD PRESSURE: 60 MMHG | BODY MASS INDEX: 28 KG/M2

## 2021-07-14 DIAGNOSIS — N18.4 CHRONIC KIDNEY DISEASE, STAGE IV (SEVERE): Primary | ICD-10-CM

## 2021-07-14 PROCEDURE — 1159F MED LIST DOCD IN RCRD: CPT | Mod: S$GLB,,, | Performed by: INTERNAL MEDICINE

## 2021-07-14 PROCEDURE — 3077F PR MOST RECENT SYSTOLIC BLOOD PRESSURE >= 140 MM HG: ICD-10-PCS | Mod: CPTII,S$GLB,, | Performed by: INTERNAL MEDICINE

## 2021-07-14 PROCEDURE — 99213 OFFICE O/P EST LOW 20 MIN: CPT | Mod: S$GLB,,, | Performed by: INTERNAL MEDICINE

## 2021-07-14 PROCEDURE — 1159F PR MEDICATION LIST DOCUMENTED IN MEDICAL RECORD: ICD-10-PCS | Mod: S$GLB,,, | Performed by: INTERNAL MEDICINE

## 2021-07-14 PROCEDURE — 3288F FALL RISK ASSESSMENT DOCD: CPT | Mod: CPTII,S$GLB,, | Performed by: INTERNAL MEDICINE

## 2021-07-14 PROCEDURE — 99999 PR PBB SHADOW E&M-EST. PATIENT-LVL IV: CPT | Mod: PBBFAC,,, | Performed by: INTERNAL MEDICINE

## 2021-07-14 PROCEDURE — 1101F PR PT FALLS ASSESS DOC 0-1 FALLS W/OUT INJ PAST YR: ICD-10-PCS | Mod: CPTII,S$GLB,, | Performed by: INTERNAL MEDICINE

## 2021-07-14 PROCEDURE — 1101F PT FALLS ASSESS-DOCD LE1/YR: CPT | Mod: CPTII,S$GLB,, | Performed by: INTERNAL MEDICINE

## 2021-07-14 PROCEDURE — 1126F PR PAIN SEVERITY QUANTIFIED, NO PAIN PRESENT: ICD-10-PCS | Mod: S$GLB,,, | Performed by: INTERNAL MEDICINE

## 2021-07-14 PROCEDURE — 3288F PR FALLS RISK ASSESSMENT DOCUMENTED: ICD-10-PCS | Mod: CPTII,S$GLB,, | Performed by: INTERNAL MEDICINE

## 2021-07-14 PROCEDURE — 99213 PR OFFICE/OUTPT VISIT, EST, LEVL III, 20-29 MIN: ICD-10-PCS | Mod: S$GLB,,, | Performed by: INTERNAL MEDICINE

## 2021-07-14 PROCEDURE — 1126F AMNT PAIN NOTED NONE PRSNT: CPT | Mod: S$GLB,,, | Performed by: INTERNAL MEDICINE

## 2021-07-14 PROCEDURE — 3077F SYST BP >= 140 MM HG: CPT | Mod: CPTII,S$GLB,, | Performed by: INTERNAL MEDICINE

## 2021-07-14 PROCEDURE — 3078F PR MOST RECENT DIASTOLIC BLOOD PRESSURE < 80 MM HG: ICD-10-PCS | Mod: CPTII,S$GLB,, | Performed by: INTERNAL MEDICINE

## 2021-07-14 PROCEDURE — 99999 PR PBB SHADOW E&M-EST. PATIENT-LVL IV: ICD-10-PCS | Mod: PBBFAC,,, | Performed by: INTERNAL MEDICINE

## 2021-07-14 PROCEDURE — 3078F DIAST BP <80 MM HG: CPT | Mod: CPTII,S$GLB,, | Performed by: INTERNAL MEDICINE

## 2021-07-16 ENCOUNTER — PATIENT MESSAGE (OUTPATIENT)
Dept: SLEEP MEDICINE | Facility: CLINIC | Age: 81
End: 2021-07-16

## 2021-07-16 ENCOUNTER — OFFICE VISIT (OUTPATIENT)
Dept: SLEEP MEDICINE | Facility: CLINIC | Age: 81
End: 2021-07-16
Payer: MEDICARE

## 2021-07-16 VITALS — WEIGHT: 154.19 LBS | HEIGHT: 63 IN | BODY MASS INDEX: 27.32 KG/M2

## 2021-07-16 DIAGNOSIS — E11.59 HYPERTENSION ASSOCIATED WITH DIABETES: ICD-10-CM

## 2021-07-16 DIAGNOSIS — I15.2 HYPERTENSION ASSOCIATED WITH DIABETES: ICD-10-CM

## 2021-07-16 DIAGNOSIS — G47.33 OBSTRUCTIVE SLEEP APNEA: Primary | ICD-10-CM

## 2021-07-16 DIAGNOSIS — I42.8 NONISCHEMIC CARDIOMYOPATHY: ICD-10-CM

## 2021-07-16 PROCEDURE — 1126F AMNT PAIN NOTED NONE PRSNT: CPT | Mod: S$GLB,,, | Performed by: NURSE PRACTITIONER

## 2021-07-16 PROCEDURE — 99214 PR OFFICE/OUTPT VISIT, EST, LEVL IV, 30-39 MIN: ICD-10-PCS | Mod: S$GLB,,, | Performed by: NURSE PRACTITIONER

## 2021-07-16 PROCEDURE — 1126F PR PAIN SEVERITY QUANTIFIED, NO PAIN PRESENT: ICD-10-PCS | Mod: S$GLB,,, | Performed by: NURSE PRACTITIONER

## 2021-07-16 PROCEDURE — 3288F PR FALLS RISK ASSESSMENT DOCUMENTED: ICD-10-PCS | Mod: CPTII,S$GLB,, | Performed by: NURSE PRACTITIONER

## 2021-07-16 PROCEDURE — 3288F FALL RISK ASSESSMENT DOCD: CPT | Mod: CPTII,S$GLB,, | Performed by: NURSE PRACTITIONER

## 2021-07-16 PROCEDURE — 1101F PT FALLS ASSESS-DOCD LE1/YR: CPT | Mod: CPTII,S$GLB,, | Performed by: NURSE PRACTITIONER

## 2021-07-16 PROCEDURE — 99214 OFFICE O/P EST MOD 30 MIN: CPT | Mod: S$GLB,,, | Performed by: NURSE PRACTITIONER

## 2021-07-16 PROCEDURE — 99999 PR PBB SHADOW E&M-EST. PATIENT-LVL II: ICD-10-PCS | Mod: PBBFAC,,, | Performed by: NURSE PRACTITIONER

## 2021-07-16 PROCEDURE — 1159F MED LIST DOCD IN RCRD: CPT | Mod: S$GLB,,, | Performed by: NURSE PRACTITIONER

## 2021-07-16 PROCEDURE — 99999 PR PBB SHADOW E&M-EST. PATIENT-LVL II: CPT | Mod: PBBFAC,,, | Performed by: NURSE PRACTITIONER

## 2021-07-16 PROCEDURE — 1159F PR MEDICATION LIST DOCUMENTED IN MEDICAL RECORD: ICD-10-PCS | Mod: S$GLB,,, | Performed by: NURSE PRACTITIONER

## 2021-07-16 PROCEDURE — 1101F PR PT FALLS ASSESS DOC 0-1 FALLS W/OUT INJ PAST YR: ICD-10-PCS | Mod: CPTII,S$GLB,, | Performed by: NURSE PRACTITIONER

## 2021-07-21 ENCOUNTER — TELEPHONE (OUTPATIENT)
Dept: ENDOCRINOLOGY | Facility: CLINIC | Age: 81
End: 2021-07-21

## 2021-07-22 ENCOUNTER — TELEPHONE (OUTPATIENT)
Dept: INTERNAL MEDICINE | Facility: CLINIC | Age: 81
End: 2021-07-22

## 2021-07-23 ENCOUNTER — OFFICE VISIT (OUTPATIENT)
Dept: INTERNAL MEDICINE | Facility: CLINIC | Age: 81
End: 2021-07-23
Payer: MEDICARE

## 2021-07-23 ENCOUNTER — HOSPITAL ENCOUNTER (OUTPATIENT)
Dept: RADIOLOGY | Facility: HOSPITAL | Age: 81
Discharge: HOME | End: 2021-07-23
Attending: INTERNAL MEDICINE
Payer: MEDICARE

## 2021-07-23 VITALS
HEART RATE: 68 BPM | SYSTOLIC BLOOD PRESSURE: 135 MMHG | BODY MASS INDEX: 27.29 KG/M2 | HEIGHT: 63 IN | TEMPERATURE: 98 F | WEIGHT: 154 LBS | DIASTOLIC BLOOD PRESSURE: 60 MMHG | OXYGEN SATURATION: 98 %

## 2021-07-23 DIAGNOSIS — J18.9 PNEUMONIA DUE TO INFECTIOUS ORGANISM, UNSPECIFIED LATERALITY, UNSPECIFIED PART OF LUNG: Primary | ICD-10-CM

## 2021-07-23 DIAGNOSIS — Z79.4 CONTROLLED TYPE 2 DIABETES MELLITUS WITH BOTH EYES AFFECTED BY MILD NONPROLIFERATIVE RETINOPATHY AND MACULAR EDEMA, WITH LONG-TERM CURRENT USE OF INSULIN: ICD-10-CM

## 2021-07-23 DIAGNOSIS — N18.4 CKD (CHRONIC KIDNEY DISEASE) STAGE 4, GFR 15-29 ML/MIN: ICD-10-CM

## 2021-07-23 DIAGNOSIS — E11.3213 CONTROLLED TYPE 2 DIABETES MELLITUS WITH BOTH EYES AFFECTED BY MILD NONPROLIFERATIVE RETINOPATHY AND MACULAR EDEMA, WITH LONG-TERM CURRENT USE OF INSULIN: ICD-10-CM

## 2021-07-23 DIAGNOSIS — I50.43 ACUTE ON CHRONIC COMBINED SYSTOLIC AND DIASTOLIC HEART FAILURE: ICD-10-CM

## 2021-07-23 DIAGNOSIS — J18.9 PNEUMONIA DUE TO INFECTIOUS ORGANISM, UNSPECIFIED LATERALITY, UNSPECIFIED PART OF LUNG: ICD-10-CM

## 2021-07-23 DIAGNOSIS — Z86.16 PERSONAL HISTORY OF COVID-19: ICD-10-CM

## 2021-07-23 PROBLEM — J12.82 PNEUMONIA DUE TO COVID-19 VIRUS: Status: RESOLVED | Noted: 2020-12-06 | Resolved: 2021-07-23

## 2021-07-23 PROBLEM — U07.1 PNEUMONIA DUE TO COVID-19 VIRUS: Status: RESOLVED | Noted: 2020-12-06 | Resolved: 2021-07-23

## 2021-07-23 PROCEDURE — 1159F MED LIST DOCD IN RCRD: CPT | Mod: CPTII,S$GLB,, | Performed by: INTERNAL MEDICINE

## 2021-07-23 PROCEDURE — 3078F DIAST BP <80 MM HG: CPT | Mod: CPTII,S$GLB,, | Performed by: INTERNAL MEDICINE

## 2021-07-23 PROCEDURE — 3075F SYST BP GE 130 - 139MM HG: CPT | Mod: CPTII,S$GLB,, | Performed by: INTERNAL MEDICINE

## 2021-07-23 PROCEDURE — 3288F PR FALLS RISK ASSESSMENT DOCUMENTED: ICD-10-PCS | Mod: CPTII,S$GLB,, | Performed by: INTERNAL MEDICINE

## 2021-07-23 PROCEDURE — 99999 PR PBB SHADOW E&M-EST. PATIENT-LVL III: ICD-10-PCS | Mod: PBBFAC,,, | Performed by: INTERNAL MEDICINE

## 2021-07-23 PROCEDURE — 71046 X-RAY EXAM CHEST 2 VIEWS: CPT | Mod: TC

## 2021-07-23 PROCEDURE — 3075F PR MOST RECENT SYSTOLIC BLOOD PRESS GE 130-139MM HG: ICD-10-PCS | Mod: CPTII,S$GLB,, | Performed by: INTERNAL MEDICINE

## 2021-07-23 PROCEDURE — 99999 PR PBB SHADOW E&M-EST. PATIENT-LVL III: CPT | Mod: PBBFAC,,, | Performed by: INTERNAL MEDICINE

## 2021-07-23 PROCEDURE — 1101F PT FALLS ASSESS-DOCD LE1/YR: CPT | Mod: CPTII,S$GLB,, | Performed by: INTERNAL MEDICINE

## 2021-07-23 PROCEDURE — 71046 XR CHEST PA AND LATERAL: ICD-10-PCS | Mod: 26,,, | Performed by: RADIOLOGY

## 2021-07-23 PROCEDURE — 1126F PR PAIN SEVERITY QUANTIFIED, NO PAIN PRESENT: ICD-10-PCS | Mod: CPTII,S$GLB,, | Performed by: INTERNAL MEDICINE

## 2021-07-23 PROCEDURE — 1159F PR MEDICATION LIST DOCUMENTED IN MEDICAL RECORD: ICD-10-PCS | Mod: CPTII,S$GLB,, | Performed by: INTERNAL MEDICINE

## 2021-07-23 PROCEDURE — 99215 PR OFFICE/OUTPT VISIT, EST, LEVL V, 40-54 MIN: ICD-10-PCS | Mod: 25,S$GLB,, | Performed by: INTERNAL MEDICINE

## 2021-07-23 PROCEDURE — 94640 AIRWAY INHALATION TREATMENT: CPT | Mod: S$GLB,,, | Performed by: INTERNAL MEDICINE

## 2021-07-23 PROCEDURE — 99215 OFFICE O/P EST HI 40 MIN: CPT | Mod: 25,S$GLB,, | Performed by: INTERNAL MEDICINE

## 2021-07-23 PROCEDURE — 94640 PR INHAL RX, AIRWAY OBST/DX SPUTUM INDUCT: ICD-10-PCS | Mod: S$GLB,,, | Performed by: INTERNAL MEDICINE

## 2021-07-23 PROCEDURE — 71046 X-RAY EXAM CHEST 2 VIEWS: CPT | Mod: 26,,, | Performed by: RADIOLOGY

## 2021-07-23 PROCEDURE — 1101F PR PT FALLS ASSESS DOC 0-1 FALLS W/OUT INJ PAST YR: ICD-10-PCS | Mod: CPTII,S$GLB,, | Performed by: INTERNAL MEDICINE

## 2021-07-23 PROCEDURE — 3078F PR MOST RECENT DIASTOLIC BLOOD PRESSURE < 80 MM HG: ICD-10-PCS | Mod: CPTII,S$GLB,, | Performed by: INTERNAL MEDICINE

## 2021-07-23 PROCEDURE — 3288F FALL RISK ASSESSMENT DOCD: CPT | Mod: CPTII,S$GLB,, | Performed by: INTERNAL MEDICINE

## 2021-07-23 PROCEDURE — 1126F AMNT PAIN NOTED NONE PRSNT: CPT | Mod: CPTII,S$GLB,, | Performed by: INTERNAL MEDICINE

## 2021-07-23 RX ORDER — DOXYCYCLINE 100 MG/1
100 CAPSULE ORAL 2 TIMES DAILY
Qty: 20 CAPSULE | Refills: 0 | Status: SHIPPED | OUTPATIENT
Start: 2021-07-23 | End: 2021-08-09 | Stop reason: ALTCHOICE

## 2021-07-23 RX ORDER — IRON SUCROSE 20 MG/ML
INJECTION, SOLUTION INTRAVENOUS
COMMUNITY
Start: 2021-04-12 | End: 2022-04-01

## 2021-07-23 RX ORDER — PREDNISONE 10 MG/1
10 TABLET ORAL DAILY
Qty: 7 TABLET | Refills: 0 | Status: SHIPPED | OUTPATIENT
Start: 2021-07-23 | End: 2021-08-09 | Stop reason: ALTCHOICE

## 2021-07-23 RX ORDER — ALBUTEROL SULFATE 0.83 MG/ML
2.5 SOLUTION RESPIRATORY (INHALATION)
Status: COMPLETED | OUTPATIENT
Start: 2021-07-23 | End: 2021-07-23

## 2021-07-23 RX ADMIN — ALBUTEROL SULFATE 2.5 MG: 0.83 SOLUTION RESPIRATORY (INHALATION) at 12:07

## 2021-07-26 DIAGNOSIS — E11.65 UNCONTROLLED TYPE 2 DIABETES MELLITUS WITH HYPERGLYCEMIA: ICD-10-CM

## 2021-07-27 ENCOUNTER — CLINICAL SUPPORT (OUTPATIENT)
Dept: OTOLARYNGOLOGY | Facility: CLINIC | Age: 81
End: 2021-07-27
Payer: MEDICARE

## 2021-07-27 DIAGNOSIS — Z46.1 ENCOUNTER FOR FITTING AND ADJUSTMENT OF HEARING AID OF BOTH EARS: Primary | ICD-10-CM

## 2021-07-27 PROCEDURE — 99499 NO LOS: ICD-10-PCS | Mod: S$GLB,,, | Performed by: AUDIOLOGIST-HEARING AID FITTER

## 2021-07-27 PROCEDURE — 99499 UNLISTED E&M SERVICE: CPT | Mod: S$GLB,,, | Performed by: AUDIOLOGIST-HEARING AID FITTER

## 2021-08-06 RX ORDER — LINAGLIPTIN 5 MG/1
TABLET, FILM COATED ORAL
Qty: 90 TABLET | Refills: 3 | Status: SHIPPED | OUTPATIENT
Start: 2021-08-06 | End: 2022-01-25

## 2021-08-09 ENCOUNTER — OFFICE VISIT (OUTPATIENT)
Dept: CARDIOLOGY | Facility: CLINIC | Age: 81
End: 2021-08-09
Payer: MEDICARE

## 2021-08-09 VITALS
OXYGEN SATURATION: 95 % | BODY MASS INDEX: 27.31 KG/M2 | HEIGHT: 63 IN | WEIGHT: 154.13 LBS | SYSTOLIC BLOOD PRESSURE: 158 MMHG | DIASTOLIC BLOOD PRESSURE: 70 MMHG | HEART RATE: 84 BPM

## 2021-08-09 DIAGNOSIS — E11.69 HYPERLIPIDEMIA ASSOCIATED WITH TYPE 2 DIABETES MELLITUS: ICD-10-CM

## 2021-08-09 DIAGNOSIS — I44.7 LBBB (LEFT BUNDLE BRANCH BLOCK): ICD-10-CM

## 2021-08-09 DIAGNOSIS — Z95.810 BIVENTRICULAR ICD (IMPLANTABLE CARDIOVERTER-DEFIBRILLATOR) IN PLACE: ICD-10-CM

## 2021-08-09 DIAGNOSIS — E78.5 HYPERLIPIDEMIA ASSOCIATED WITH TYPE 2 DIABETES MELLITUS: ICD-10-CM

## 2021-08-09 DIAGNOSIS — E11.59 HYPERTENSION ASSOCIATED WITH DIABETES: ICD-10-CM

## 2021-08-09 DIAGNOSIS — I15.2 HYPERTENSION ASSOCIATED WITH DIABETES: ICD-10-CM

## 2021-08-09 DIAGNOSIS — I42.8 NONISCHEMIC CARDIOMYOPATHY: Primary | ICD-10-CM

## 2021-08-09 DIAGNOSIS — I50.42 CHRONIC COMBINED SYSTOLIC AND DIASTOLIC HEART FAILURE: ICD-10-CM

## 2021-08-09 DIAGNOSIS — I70.0 CALCIFICATION OF AORTA: ICD-10-CM

## 2021-08-09 DIAGNOSIS — N18.32 STAGE 3B CHRONIC KIDNEY DISEASE: Primary | ICD-10-CM

## 2021-08-09 PROCEDURE — 1159F PR MEDICATION LIST DOCUMENTED IN MEDICAL RECORD: ICD-10-PCS | Mod: CPTII,S$GLB,, | Performed by: NURSE PRACTITIONER

## 2021-08-09 PROCEDURE — 99999 PR PBB SHADOW E&M-EST. PATIENT-LVL V: CPT | Mod: PBBFAC,,, | Performed by: NURSE PRACTITIONER

## 2021-08-09 PROCEDURE — 1101F PR PT FALLS ASSESS DOC 0-1 FALLS W/OUT INJ PAST YR: ICD-10-PCS | Mod: CPTII,S$GLB,, | Performed by: NURSE PRACTITIONER

## 2021-08-09 PROCEDURE — 1126F AMNT PAIN NOTED NONE PRSNT: CPT | Mod: CPTII,S$GLB,, | Performed by: NURSE PRACTITIONER

## 2021-08-09 PROCEDURE — 99499 RISK ADDL DX/OHS AUDIT: ICD-10-PCS | Mod: S$GLB,,, | Performed by: NURSE PRACTITIONER

## 2021-08-09 PROCEDURE — 3077F SYST BP >= 140 MM HG: CPT | Mod: CPTII,S$GLB,, | Performed by: NURSE PRACTITIONER

## 2021-08-09 PROCEDURE — 1101F PT FALLS ASSESS-DOCD LE1/YR: CPT | Mod: CPTII,S$GLB,, | Performed by: NURSE PRACTITIONER

## 2021-08-09 PROCEDURE — 3288F FALL RISK ASSESSMENT DOCD: CPT | Mod: CPTII,S$GLB,, | Performed by: NURSE PRACTITIONER

## 2021-08-09 PROCEDURE — 3078F DIAST BP <80 MM HG: CPT | Mod: CPTII,S$GLB,, | Performed by: NURSE PRACTITIONER

## 2021-08-09 PROCEDURE — 1159F MED LIST DOCD IN RCRD: CPT | Mod: CPTII,S$GLB,, | Performed by: NURSE PRACTITIONER

## 2021-08-09 PROCEDURE — 1126F PR PAIN SEVERITY QUANTIFIED, NO PAIN PRESENT: ICD-10-PCS | Mod: CPTII,S$GLB,, | Performed by: NURSE PRACTITIONER

## 2021-08-09 PROCEDURE — 3077F PR MOST RECENT SYSTOLIC BLOOD PRESSURE >= 140 MM HG: ICD-10-PCS | Mod: CPTII,S$GLB,, | Performed by: NURSE PRACTITIONER

## 2021-08-09 PROCEDURE — 3288F PR FALLS RISK ASSESSMENT DOCUMENTED: ICD-10-PCS | Mod: CPTII,S$GLB,, | Performed by: NURSE PRACTITIONER

## 2021-08-09 PROCEDURE — 1160F RVW MEDS BY RX/DR IN RCRD: CPT | Mod: CPTII,S$GLB,, | Performed by: NURSE PRACTITIONER

## 2021-08-09 PROCEDURE — 99499 UNLISTED E&M SERVICE: CPT | Mod: S$GLB,,, | Performed by: NURSE PRACTITIONER

## 2021-08-09 PROCEDURE — 99214 OFFICE O/P EST MOD 30 MIN: CPT | Mod: S$GLB,,, | Performed by: NURSE PRACTITIONER

## 2021-08-09 PROCEDURE — 99214 PR OFFICE/OUTPT VISIT, EST, LEVL IV, 30-39 MIN: ICD-10-PCS | Mod: S$GLB,,, | Performed by: NURSE PRACTITIONER

## 2021-08-09 PROCEDURE — 3078F PR MOST RECENT DIASTOLIC BLOOD PRESSURE < 80 MM HG: ICD-10-PCS | Mod: CPTII,S$GLB,, | Performed by: NURSE PRACTITIONER

## 2021-08-09 PROCEDURE — 1160F PR REVIEW ALL MEDS BY PRESCRIBER/CLIN PHARMACIST DOCUMENTED: ICD-10-PCS | Mod: CPTII,S$GLB,, | Performed by: NURSE PRACTITIONER

## 2021-08-09 PROCEDURE — 99999 PR PBB SHADOW E&M-EST. PATIENT-LVL V: ICD-10-PCS | Mod: PBBFAC,,, | Performed by: NURSE PRACTITIONER

## 2021-08-09 RX ORDER — SODIUM BICARBONATE 650 MG/1
650 TABLET ORAL 2 TIMES DAILY
Qty: 60 TABLET | Refills: 0 | Status: SHIPPED | OUTPATIENT
Start: 2021-08-09 | End: 2021-08-24

## 2021-08-10 ENCOUNTER — CLINICAL SUPPORT (OUTPATIENT)
Dept: OTOLARYNGOLOGY | Facility: CLINIC | Age: 81
End: 2021-08-10
Payer: MEDICARE

## 2021-08-10 ENCOUNTER — PATIENT MESSAGE (OUTPATIENT)
Dept: ADMINISTRATIVE | Facility: HOSPITAL | Age: 81
End: 2021-08-10

## 2021-08-10 ENCOUNTER — PATIENT OUTREACH (OUTPATIENT)
Dept: ADMINISTRATIVE | Facility: HOSPITAL | Age: 81
End: 2021-08-10
Payer: MEDICARE

## 2021-08-10 DIAGNOSIS — M89.9 DISORDER OF BONE AND ARTICULAR CARTILAGE: Primary | ICD-10-CM

## 2021-08-10 DIAGNOSIS — Z78.0 ASYMPTOMATIC MENOPAUSAL STATE: ICD-10-CM

## 2021-08-10 DIAGNOSIS — M94.9 DISORDER OF BONE AND ARTICULAR CARTILAGE: Primary | ICD-10-CM

## 2021-08-10 DIAGNOSIS — Z71.89 ENCOUNTER FOR HEARING AID CONSULTATION: Primary | ICD-10-CM

## 2021-08-10 PROCEDURE — V5261 HEARING AID, DIGIT, BIN, BTE: HCPCS | Mod: CSM,S$GLB,, | Performed by: AUDIOLOGIST-HEARING AID FITTER

## 2021-08-10 PROCEDURE — NOLTAX-P ORLEANS PRESCRIBED 4.5%: ICD-10-PCS | Mod: CSM,S$GLB,, | Performed by: AUDIOLOGIST-HEARING AID FITTER

## 2021-08-10 PROCEDURE — NOLTAX-P ORLEANS PRESCRIBED 4.5%: Mod: CSM,S$GLB,, | Performed by: AUDIOLOGIST-HEARING AID FITTER

## 2021-08-10 PROCEDURE — V5261 PR HEARING AID, DIGIT, BIN, BTE: ICD-10-PCS | Mod: CSM,S$GLB,, | Performed by: AUDIOLOGIST-HEARING AID FITTER

## 2021-08-11 ENCOUNTER — CLINICAL SUPPORT (OUTPATIENT)
Dept: CARDIOLOGY | Facility: HOSPITAL | Age: 81
End: 2021-08-11
Payer: MEDICARE

## 2021-08-11 DIAGNOSIS — Z95.810 PRESENCE OF AUTOMATIC (IMPLANTABLE) CARDIAC DEFIBRILLATOR: ICD-10-CM

## 2021-08-11 PROCEDURE — 93296 REM INTERROG EVL PM/IDS: CPT | Performed by: INTERNAL MEDICINE

## 2021-08-11 PROCEDURE — 93295 CARDIAC DEVICE CHECK - REMOTE: ICD-10-PCS | Mod: ,,, | Performed by: INTERNAL MEDICINE

## 2021-08-11 PROCEDURE — 93295 DEV INTERROG REMOTE 1/2/MLT: CPT | Mod: ,,, | Performed by: INTERNAL MEDICINE

## 2021-08-24 ENCOUNTER — OFFICE VISIT (OUTPATIENT)
Dept: INTERNAL MEDICINE | Facility: CLINIC | Age: 81
End: 2021-08-24
Payer: MEDICARE

## 2021-08-24 ENCOUNTER — LAB VISIT (OUTPATIENT)
Dept: LAB | Facility: HOSPITAL | Age: 81
End: 2021-08-24
Attending: INTERNAL MEDICINE
Payer: MEDICARE

## 2021-08-24 VITALS
DIASTOLIC BLOOD PRESSURE: 48 MMHG | OXYGEN SATURATION: 94 % | SYSTOLIC BLOOD PRESSURE: 130 MMHG | WEIGHT: 156.31 LBS | HEIGHT: 63 IN | BODY MASS INDEX: 27.7 KG/M2 | HEART RATE: 82 BPM

## 2021-08-24 DIAGNOSIS — I50.42 CHRONIC COMBINED SYSTOLIC AND DIASTOLIC HEART FAILURE: ICD-10-CM

## 2021-08-24 DIAGNOSIS — D50.9 IRON DEFICIENCY ANEMIA, UNSPECIFIED IRON DEFICIENCY ANEMIA TYPE: ICD-10-CM

## 2021-08-24 DIAGNOSIS — Z79.4 CONTROLLED TYPE 2 DIABETES MELLITUS WITH BOTH EYES AFFECTED BY MILD NONPROLIFERATIVE RETINOPATHY AND MACULAR EDEMA, WITH LONG-TERM CURRENT USE OF INSULIN: ICD-10-CM

## 2021-08-24 DIAGNOSIS — I10 ESSENTIAL HYPERTENSION: ICD-10-CM

## 2021-08-24 DIAGNOSIS — E11.3213 CONTROLLED TYPE 2 DIABETES MELLITUS WITH BOTH EYES AFFECTED BY MILD NONPROLIFERATIVE RETINOPATHY AND MACULAR EDEMA, WITH LONG-TERM CURRENT USE OF INSULIN: ICD-10-CM

## 2021-08-24 DIAGNOSIS — N18.4 CKD (CHRONIC KIDNEY DISEASE) STAGE 4, GFR 15-29 ML/MIN: ICD-10-CM

## 2021-08-24 DIAGNOSIS — I50.42 CHRONIC COMBINED SYSTOLIC AND DIASTOLIC HEART FAILURE: Primary | ICD-10-CM

## 2021-08-24 DIAGNOSIS — J44.9 CHRONIC OBSTRUCTIVE PULMONARY DISEASE, UNSPECIFIED COPD TYPE: ICD-10-CM

## 2021-08-24 LAB
ALBUMIN SERPL BCP-MCNC: 2.9 G/DL (ref 3.5–5.2)
ALP SERPL-CCNC: 131 U/L (ref 55–135)
ALT SERPL W/O P-5'-P-CCNC: 19 U/L (ref 10–44)
ANION GAP SERPL CALC-SCNC: 9 MMOL/L (ref 8–16)
AST SERPL-CCNC: 19 U/L (ref 10–40)
BASOPHILS # BLD AUTO: 0.05 K/UL (ref 0–0.2)
BASOPHILS NFR BLD: 0.6 % (ref 0–1.9)
BILIRUB SERPL-MCNC: 0.5 MG/DL (ref 0.1–1)
BNP SERPL-MCNC: 611 PG/ML (ref 0–99)
BUN SERPL-MCNC: 43 MG/DL (ref 8–23)
CALCIUM SERPL-MCNC: 9.3 MG/DL (ref 8.7–10.5)
CHLORIDE SERPL-SCNC: 102 MMOL/L (ref 95–110)
CO2 SERPL-SCNC: 20 MMOL/L (ref 23–29)
CREAT SERPL-MCNC: 2.1 MG/DL (ref 0.5–1.4)
DIFFERENTIAL METHOD: ABNORMAL
EOSINOPHIL # BLD AUTO: 0.2 K/UL (ref 0–0.5)
EOSINOPHIL NFR BLD: 2 % (ref 0–8)
ERYTHROCYTE [DISTWIDTH] IN BLOOD BY AUTOMATED COUNT: 12.8 % (ref 11.5–14.5)
EST. GFR  (AFRICAN AMERICAN): 24.7 ML/MIN/1.73 M^2
EST. GFR  (NON AFRICAN AMERICAN): 21.4 ML/MIN/1.73 M^2
ESTIMATED AVG GLUCOSE: 143 MG/DL (ref 68–131)
GLUCOSE SERPL-MCNC: 200 MG/DL (ref 70–110)
HBA1C MFR BLD: 6.6 % (ref 4–5.6)
HCT VFR BLD AUTO: 29 % (ref 37–48.5)
HGB BLD-MCNC: 9.1 G/DL (ref 12–16)
IMM GRANULOCYTES # BLD AUTO: 0.08 K/UL (ref 0–0.04)
IMM GRANULOCYTES NFR BLD AUTO: 0.9 % (ref 0–0.5)
LYMPHOCYTES # BLD AUTO: 0.9 K/UL (ref 1–4.8)
LYMPHOCYTES NFR BLD: 10.4 % (ref 18–48)
MCH RBC QN AUTO: 29.2 PG (ref 27–31)
MCHC RBC AUTO-ENTMCNC: 31.4 G/DL (ref 32–36)
MCV RBC AUTO: 93 FL (ref 82–98)
MONOCYTES # BLD AUTO: 0.7 K/UL (ref 0.3–1)
MONOCYTES NFR BLD: 8.1 % (ref 4–15)
NEUTROPHILS # BLD AUTO: 6.6 K/UL (ref 1.8–7.7)
NEUTROPHILS NFR BLD: 78 % (ref 38–73)
NRBC BLD-RTO: 0 /100 WBC
PLATELET # BLD AUTO: 263 K/UL (ref 150–450)
PMV BLD AUTO: 11.4 FL (ref 9.2–12.9)
POTASSIUM SERPL-SCNC: 5.1 MMOL/L (ref 3.5–5.1)
PROT SERPL-MCNC: 6.6 G/DL (ref 6–8.4)
RBC # BLD AUTO: 3.12 M/UL (ref 4–5.4)
SODIUM SERPL-SCNC: 131 MMOL/L (ref 136–145)
WBC # BLD AUTO: 8.49 K/UL (ref 3.9–12.7)

## 2021-08-24 PROCEDURE — 83036 HEMOGLOBIN GLYCOSYLATED A1C: CPT | Performed by: INTERNAL MEDICINE

## 2021-08-24 PROCEDURE — 85025 COMPLETE CBC W/AUTO DIFF WBC: CPT | Performed by: INTERNAL MEDICINE

## 2021-08-24 PROCEDURE — 1159F MED LIST DOCD IN RCRD: CPT | Mod: CPTII,S$GLB,, | Performed by: INTERNAL MEDICINE

## 2021-08-24 PROCEDURE — 3288F PR FALLS RISK ASSESSMENT DOCUMENTED: ICD-10-PCS | Mod: CPTII,S$GLB,, | Performed by: INTERNAL MEDICINE

## 2021-08-24 PROCEDURE — 1126F AMNT PAIN NOTED NONE PRSNT: CPT | Mod: CPTII,S$GLB,, | Performed by: INTERNAL MEDICINE

## 2021-08-24 PROCEDURE — 99214 PR OFFICE/OUTPT VISIT, EST, LEVL IV, 30-39 MIN: ICD-10-PCS | Mod: S$GLB,,, | Performed by: INTERNAL MEDICINE

## 2021-08-24 PROCEDURE — 99999 PR PBB SHADOW E&M-EST. PATIENT-LVL III: CPT | Mod: PBBFAC,,, | Performed by: INTERNAL MEDICINE

## 2021-08-24 PROCEDURE — 3078F PR MOST RECENT DIASTOLIC BLOOD PRESSURE < 80 MM HG: ICD-10-PCS | Mod: CPTII,S$GLB,, | Performed by: INTERNAL MEDICINE

## 2021-08-24 PROCEDURE — 83880 ASSAY OF NATRIURETIC PEPTIDE: CPT | Performed by: INTERNAL MEDICINE

## 2021-08-24 PROCEDURE — 3075F SYST BP GE 130 - 139MM HG: CPT | Mod: CPTII,S$GLB,, | Performed by: INTERNAL MEDICINE

## 2021-08-24 PROCEDURE — 3078F DIAST BP <80 MM HG: CPT | Mod: CPTII,S$GLB,, | Performed by: INTERNAL MEDICINE

## 2021-08-24 PROCEDURE — 1101F PT FALLS ASSESS-DOCD LE1/YR: CPT | Mod: CPTII,S$GLB,, | Performed by: INTERNAL MEDICINE

## 2021-08-24 PROCEDURE — 3075F PR MOST RECENT SYSTOLIC BLOOD PRESS GE 130-139MM HG: ICD-10-PCS | Mod: CPTII,S$GLB,, | Performed by: INTERNAL MEDICINE

## 2021-08-24 PROCEDURE — 1126F PR PAIN SEVERITY QUANTIFIED, NO PAIN PRESENT: ICD-10-PCS | Mod: CPTII,S$GLB,, | Performed by: INTERNAL MEDICINE

## 2021-08-24 PROCEDURE — 1159F PR MEDICATION LIST DOCUMENTED IN MEDICAL RECORD: ICD-10-PCS | Mod: CPTII,S$GLB,, | Performed by: INTERNAL MEDICINE

## 2021-08-24 PROCEDURE — 80053 COMPREHEN METABOLIC PANEL: CPT | Performed by: INTERNAL MEDICINE

## 2021-08-24 PROCEDURE — 99999 PR PBB SHADOW E&M-EST. PATIENT-LVL III: ICD-10-PCS | Mod: PBBFAC,,, | Performed by: INTERNAL MEDICINE

## 2021-08-24 PROCEDURE — 1101F PR PT FALLS ASSESS DOC 0-1 FALLS W/OUT INJ PAST YR: ICD-10-PCS | Mod: CPTII,S$GLB,, | Performed by: INTERNAL MEDICINE

## 2021-08-24 PROCEDURE — 3288F FALL RISK ASSESSMENT DOCD: CPT | Mod: CPTII,S$GLB,, | Performed by: INTERNAL MEDICINE

## 2021-08-24 PROCEDURE — 36415 COLL VENOUS BLD VENIPUNCTURE: CPT | Performed by: INTERNAL MEDICINE

## 2021-08-24 PROCEDURE — 99214 OFFICE O/P EST MOD 30 MIN: CPT | Mod: S$GLB,,, | Performed by: INTERNAL MEDICINE

## 2021-08-25 ENCOUNTER — TELEPHONE (OUTPATIENT)
Dept: INTERNAL MEDICINE | Facility: CLINIC | Age: 81
End: 2021-08-25

## 2021-09-02 ENCOUNTER — TELEPHONE (OUTPATIENT)
Dept: TRANSPLANT | Facility: CLINIC | Age: 81
End: 2021-09-02

## 2021-09-02 ENCOUNTER — TELEPHONE (OUTPATIENT)
Dept: OTOLARYNGOLOGY | Facility: CLINIC | Age: 81
End: 2021-09-02

## 2021-09-10 ENCOUNTER — TELEPHONE (OUTPATIENT)
Dept: OTOLARYNGOLOGY | Facility: CLINIC | Age: 81
End: 2021-09-10

## 2021-09-10 ENCOUNTER — PATIENT MESSAGE (OUTPATIENT)
Dept: OTOLARYNGOLOGY | Facility: CLINIC | Age: 81
End: 2021-09-10

## 2021-09-17 ENCOUNTER — OFFICE VISIT (OUTPATIENT)
Dept: TRANSPLANT | Facility: CLINIC | Age: 81
End: 2021-09-17
Payer: MEDICARE

## 2021-09-17 ENCOUNTER — LAB VISIT (OUTPATIENT)
Dept: LAB | Facility: HOSPITAL | Age: 81
End: 2021-09-17
Attending: INTERNAL MEDICINE
Payer: MEDICARE

## 2021-09-17 VITALS
BODY MASS INDEX: 28.05 KG/M2 | WEIGHT: 158.31 LBS | HEIGHT: 63 IN | DIASTOLIC BLOOD PRESSURE: 62 MMHG | OXYGEN SATURATION: 97 % | HEART RATE: 68 BPM | SYSTOLIC BLOOD PRESSURE: 126 MMHG

## 2021-09-17 DIAGNOSIS — N18.4 CKD (CHRONIC KIDNEY DISEASE) STAGE 4, GFR 15-29 ML/MIN: ICD-10-CM

## 2021-09-17 DIAGNOSIS — I50.33 ACUTE ON CHRONIC DIASTOLIC HEART FAILURE: Primary | ICD-10-CM

## 2021-09-17 DIAGNOSIS — I50.33 ACUTE ON CHRONIC DIASTOLIC HEART FAILURE: ICD-10-CM

## 2021-09-17 DIAGNOSIS — I42.8 NONISCHEMIC CARDIOMYOPATHY: ICD-10-CM

## 2021-09-17 DIAGNOSIS — Z95.810 BIVENTRICULAR ICD (IMPLANTABLE CARDIOVERTER-DEFIBRILLATOR) IN PLACE: ICD-10-CM

## 2021-09-17 DIAGNOSIS — I44.7 LBBB (LEFT BUNDLE BRANCH BLOCK): ICD-10-CM

## 2021-09-17 DIAGNOSIS — I15.2 HYPERTENSION ASSOCIATED WITH DIABETES: ICD-10-CM

## 2021-09-17 DIAGNOSIS — I50.43 ACUTE ON CHRONIC COMBINED SYSTOLIC AND DIASTOLIC HEART FAILURE: ICD-10-CM

## 2021-09-17 DIAGNOSIS — E11.59 HYPERTENSION ASSOCIATED WITH DIABETES: ICD-10-CM

## 2021-09-17 PROBLEM — E83.42 HYPOMAGNESEMIA: Status: RESOLVED | Noted: 2017-08-31 | Resolved: 2021-09-17

## 2021-09-17 PROBLEM — I50.42 CHRONIC COMBINED SYSTOLIC AND DIASTOLIC HEART FAILURE: Status: RESOLVED | Noted: 2021-04-06 | Resolved: 2021-09-17

## 2021-09-17 PROBLEM — R80.9 PROTEINURIA: Status: RESOLVED | Noted: 2019-01-21 | Resolved: 2021-09-17

## 2021-09-17 LAB
ALBUMIN SERPL BCP-MCNC: 3.2 G/DL (ref 3.5–5.2)
ALP SERPL-CCNC: 123 U/L (ref 55–135)
ALT SERPL W/O P-5'-P-CCNC: 15 U/L (ref 10–44)
ANION GAP SERPL CALC-SCNC: 8 MMOL/L (ref 8–16)
AST SERPL-CCNC: 17 U/L (ref 10–40)
BASOPHILS # BLD AUTO: 0.05 K/UL (ref 0–0.2)
BASOPHILS NFR BLD: 0.7 % (ref 0–1.9)
BILIRUB SERPL-MCNC: 0.3 MG/DL (ref 0.1–1)
BNP SERPL-MCNC: 561 PG/ML (ref 0–99)
BUN SERPL-MCNC: 39 MG/DL (ref 8–23)
CALCIUM SERPL-MCNC: 8.9 MG/DL (ref 8.7–10.5)
CHLORIDE SERPL-SCNC: 105 MMOL/L (ref 95–110)
CO2 SERPL-SCNC: 20 MMOL/L (ref 23–29)
CREAT SERPL-MCNC: 2.3 MG/DL (ref 0.5–1.4)
DIFFERENTIAL METHOD: ABNORMAL
EOSINOPHIL # BLD AUTO: 0.2 K/UL (ref 0–0.5)
EOSINOPHIL NFR BLD: 3.3 % (ref 0–8)
ERYTHROCYTE [DISTWIDTH] IN BLOOD BY AUTOMATED COUNT: 13.9 % (ref 11.5–14.5)
EST. GFR  (AFRICAN AMERICAN): 22.1 ML/MIN/1.73 M^2
EST. GFR  (NON AFRICAN AMERICAN): 19.2 ML/MIN/1.73 M^2
GLUCOSE SERPL-MCNC: 224 MG/DL (ref 70–110)
HCT VFR BLD AUTO: 29.4 % (ref 37–48.5)
HGB BLD-MCNC: 9.1 G/DL (ref 12–16)
IMM GRANULOCYTES # BLD AUTO: 0.07 K/UL (ref 0–0.04)
IMM GRANULOCYTES NFR BLD AUTO: 1 % (ref 0–0.5)
LYMPHOCYTES # BLD AUTO: 0.9 K/UL (ref 1–4.8)
LYMPHOCYTES NFR BLD: 12.4 % (ref 18–48)
MCH RBC QN AUTO: 29.2 PG (ref 27–31)
MCHC RBC AUTO-ENTMCNC: 31 G/DL (ref 32–36)
MCV RBC AUTO: 94 FL (ref 82–98)
MONOCYTES # BLD AUTO: 0.5 K/UL (ref 0.3–1)
MONOCYTES NFR BLD: 6.7 % (ref 4–15)
NEUTROPHILS # BLD AUTO: 5.6 K/UL (ref 1.8–7.7)
NEUTROPHILS NFR BLD: 75.9 % (ref 38–73)
NRBC BLD-RTO: 0 /100 WBC
PLATELET # BLD AUTO: 220 K/UL (ref 150–450)
PMV BLD AUTO: 10.4 FL (ref 9.2–12.9)
POTASSIUM SERPL-SCNC: 4.6 MMOL/L (ref 3.5–5.1)
PROT SERPL-MCNC: 6.8 G/DL (ref 6–8.4)
RBC # BLD AUTO: 3.12 M/UL (ref 4–5.4)
SODIUM SERPL-SCNC: 133 MMOL/L (ref 136–145)
WBC # BLD AUTO: 7.36 K/UL (ref 3.9–12.7)

## 2021-09-17 PROCEDURE — 3078F PR MOST RECENT DIASTOLIC BLOOD PRESSURE < 80 MM HG: ICD-10-PCS | Mod: CPTII,S$GLB,, | Performed by: INTERNAL MEDICINE

## 2021-09-17 PROCEDURE — 1101F PR PT FALLS ASSESS DOC 0-1 FALLS W/OUT INJ PAST YR: ICD-10-PCS | Mod: CPTII,S$GLB,, | Performed by: INTERNAL MEDICINE

## 2021-09-17 PROCEDURE — 1159F MED LIST DOCD IN RCRD: CPT | Mod: CPTII,S$GLB,, | Performed by: INTERNAL MEDICINE

## 2021-09-17 PROCEDURE — 99214 OFFICE O/P EST MOD 30 MIN: CPT | Mod: S$GLB,,, | Performed by: INTERNAL MEDICINE

## 2021-09-17 PROCEDURE — 85025 COMPLETE CBC W/AUTO DIFF WBC: CPT | Performed by: INTERNAL MEDICINE

## 2021-09-17 PROCEDURE — 1126F AMNT PAIN NOTED NONE PRSNT: CPT | Mod: CPTII,S$GLB,, | Performed by: INTERNAL MEDICINE

## 2021-09-17 PROCEDURE — 99214 PR OFFICE/OUTPT VISIT, EST, LEVL IV, 30-39 MIN: ICD-10-PCS | Mod: S$GLB,,, | Performed by: INTERNAL MEDICINE

## 2021-09-17 PROCEDURE — 36415 COLL VENOUS BLD VENIPUNCTURE: CPT | Performed by: INTERNAL MEDICINE

## 2021-09-17 PROCEDURE — 3288F PR FALLS RISK ASSESSMENT DOCUMENTED: ICD-10-PCS | Mod: CPTII,S$GLB,, | Performed by: INTERNAL MEDICINE

## 2021-09-17 PROCEDURE — 3078F DIAST BP <80 MM HG: CPT | Mod: CPTII,S$GLB,, | Performed by: INTERNAL MEDICINE

## 2021-09-17 PROCEDURE — 1126F PR PAIN SEVERITY QUANTIFIED, NO PAIN PRESENT: ICD-10-PCS | Mod: CPTII,S$GLB,, | Performed by: INTERNAL MEDICINE

## 2021-09-17 PROCEDURE — 83880 ASSAY OF NATRIURETIC PEPTIDE: CPT | Performed by: INTERNAL MEDICINE

## 2021-09-17 PROCEDURE — 1101F PT FALLS ASSESS-DOCD LE1/YR: CPT | Mod: CPTII,S$GLB,, | Performed by: INTERNAL MEDICINE

## 2021-09-17 PROCEDURE — 3074F PR MOST RECENT SYSTOLIC BLOOD PRESSURE < 130 MM HG: ICD-10-PCS | Mod: CPTII,S$GLB,, | Performed by: INTERNAL MEDICINE

## 2021-09-17 PROCEDURE — 80053 COMPREHEN METABOLIC PANEL: CPT | Performed by: INTERNAL MEDICINE

## 2021-09-17 PROCEDURE — 3288F FALL RISK ASSESSMENT DOCD: CPT | Mod: CPTII,S$GLB,, | Performed by: INTERNAL MEDICINE

## 2021-09-17 PROCEDURE — 99999 PR PBB SHADOW E&M-EST. PATIENT-LVL V: ICD-10-PCS | Mod: PBBFAC,,, | Performed by: INTERNAL MEDICINE

## 2021-09-17 PROCEDURE — 1159F PR MEDICATION LIST DOCUMENTED IN MEDICAL RECORD: ICD-10-PCS | Mod: CPTII,S$GLB,, | Performed by: INTERNAL MEDICINE

## 2021-09-17 PROCEDURE — 99999 PR PBB SHADOW E&M-EST. PATIENT-LVL V: CPT | Mod: PBBFAC,,, | Performed by: INTERNAL MEDICINE

## 2021-09-17 PROCEDURE — 3074F SYST BP LT 130 MM HG: CPT | Mod: CPTII,S$GLB,, | Performed by: INTERNAL MEDICINE

## 2021-09-17 RX ORDER — FUROSEMIDE 40 MG/1
40 TABLET ORAL DAILY
Qty: 10 TABLET | Refills: 3 | Status: SHIPPED | OUTPATIENT
Start: 2021-09-17 | End: 2021-09-17 | Stop reason: SDUPTHER

## 2021-09-17 RX ORDER — POTASSIUM CHLORIDE 750 MG/1
20 TABLET, EXTENDED RELEASE ORAL DAILY
Qty: 30 TABLET | Refills: 3 | Status: SHIPPED | OUTPATIENT
Start: 2021-09-17 | End: 2021-09-17 | Stop reason: SDUPTHER

## 2021-09-17 RX ORDER — FUROSEMIDE 40 MG/1
40 TABLET ORAL DAILY
Qty: 10 TABLET | Refills: 3 | Status: SHIPPED | OUTPATIENT
Start: 2021-09-17 | End: 2021-10-26 | Stop reason: DRUGHIGH

## 2021-09-17 RX ORDER — POTASSIUM CHLORIDE 750 MG/1
20 TABLET, EXTENDED RELEASE ORAL DAILY
Qty: 30 TABLET | Refills: 3 | Status: SHIPPED | OUTPATIENT
Start: 2021-09-17 | End: 2021-10-26 | Stop reason: DRUGHIGH

## 2021-09-20 ENCOUNTER — TELEPHONE (OUTPATIENT)
Dept: OPTOMETRY | Facility: CLINIC | Age: 81
End: 2021-09-20

## 2021-09-21 ENCOUNTER — LAB VISIT (OUTPATIENT)
Dept: LAB | Facility: HOSPITAL | Age: 81
End: 2021-09-21
Attending: INTERNAL MEDICINE
Payer: MEDICARE

## 2021-09-21 DIAGNOSIS — I50.33 ACUTE ON CHRONIC DIASTOLIC HEART FAILURE: ICD-10-CM

## 2021-09-21 LAB
ANION GAP SERPL CALC-SCNC: 13 MMOL/L (ref 8–16)
BUN SERPL-MCNC: 47 MG/DL (ref 8–23)
CALCIUM SERPL-MCNC: 9.3 MG/DL (ref 8.7–10.5)
CHLORIDE SERPL-SCNC: 100 MMOL/L (ref 95–110)
CO2 SERPL-SCNC: 17 MMOL/L (ref 23–29)
CREAT SERPL-MCNC: 2.4 MG/DL (ref 0.5–1.4)
EST. GFR  (AFRICAN AMERICAN): 21 ML/MIN/1.73 M^2
EST. GFR  (NON AFRICAN AMERICAN): 18.2 ML/MIN/1.73 M^2
GLUCOSE SERPL-MCNC: 202 MG/DL (ref 70–110)
POTASSIUM SERPL-SCNC: 5.1 MMOL/L (ref 3.5–5.1)
SODIUM SERPL-SCNC: 130 MMOL/L (ref 136–145)

## 2021-09-21 PROCEDURE — 80048 BASIC METABOLIC PNL TOTAL CA: CPT | Performed by: INTERNAL MEDICINE

## 2021-09-21 PROCEDURE — 36415 COLL VENOUS BLD VENIPUNCTURE: CPT | Mod: PO | Performed by: INTERNAL MEDICINE

## 2021-09-22 ENCOUNTER — PATIENT MESSAGE (OUTPATIENT)
Dept: TRANSPLANT | Facility: CLINIC | Age: 81
End: 2021-09-22

## 2021-09-23 ENCOUNTER — OFFICE VISIT (OUTPATIENT)
Dept: OPTOMETRY | Facility: CLINIC | Age: 81
End: 2021-09-23
Payer: MEDICARE

## 2021-09-23 ENCOUNTER — TELEPHONE (OUTPATIENT)
Dept: OPHTHALMOLOGY | Facility: CLINIC | Age: 81
End: 2021-09-23

## 2021-09-23 DIAGNOSIS — H52.4 PRESBYOPIA: ICD-10-CM

## 2021-09-23 DIAGNOSIS — Z13.5 SCREENING FOR GLAUCOMA: ICD-10-CM

## 2021-09-23 DIAGNOSIS — E13.3299 MILD NONPROLIFERATIVE RETINOPATHY DUE TO SECONDARY DIABETES: Primary | ICD-10-CM

## 2021-09-23 PROCEDURE — 99999 PR PBB SHADOW E&M-EST. PATIENT-LVL II: CPT | Mod: PBBFAC,,, | Performed by: OPTOMETRIST

## 2021-09-23 PROCEDURE — 1101F PR PT FALLS ASSESS DOC 0-1 FALLS W/OUT INJ PAST YR: ICD-10-PCS | Mod: CPTII,S$GLB,, | Performed by: OPTOMETRIST

## 2021-09-23 PROCEDURE — 92015 PR REFRACTION: ICD-10-PCS | Mod: S$GLB,,, | Performed by: OPTOMETRIST

## 2021-09-23 PROCEDURE — 1159F MED LIST DOCD IN RCRD: CPT | Mod: CPTII,S$GLB,, | Performed by: OPTOMETRIST

## 2021-09-23 PROCEDURE — 3288F PR FALLS RISK ASSESSMENT DOCUMENTED: ICD-10-PCS | Mod: CPTII,S$GLB,, | Performed by: OPTOMETRIST

## 2021-09-23 PROCEDURE — 1159F PR MEDICATION LIST DOCUMENTED IN MEDICAL RECORD: ICD-10-PCS | Mod: CPTII,S$GLB,, | Performed by: OPTOMETRIST

## 2021-09-23 PROCEDURE — 1126F PR PAIN SEVERITY QUANTIFIED, NO PAIN PRESENT: ICD-10-PCS | Mod: CPTII,S$GLB,, | Performed by: OPTOMETRIST

## 2021-09-23 PROCEDURE — 92014 COMPRE OPH EXAM EST PT 1/>: CPT | Mod: S$GLB,,, | Performed by: OPTOMETRIST

## 2021-09-23 PROCEDURE — 1160F RVW MEDS BY RX/DR IN RCRD: CPT | Mod: CPTII,S$GLB,, | Performed by: OPTOMETRIST

## 2021-09-23 PROCEDURE — 1126F AMNT PAIN NOTED NONE PRSNT: CPT | Mod: CPTII,S$GLB,, | Performed by: OPTOMETRIST

## 2021-09-23 PROCEDURE — 3288F FALL RISK ASSESSMENT DOCD: CPT | Mod: CPTII,S$GLB,, | Performed by: OPTOMETRIST

## 2021-09-23 PROCEDURE — 92014 PR EYE EXAM, EST PATIENT,COMPREHESV: ICD-10-PCS | Mod: S$GLB,,, | Performed by: OPTOMETRIST

## 2021-09-23 PROCEDURE — 99999 PR PBB SHADOW E&M-EST. PATIENT-LVL II: ICD-10-PCS | Mod: PBBFAC,,, | Performed by: OPTOMETRIST

## 2021-09-23 PROCEDURE — 1160F PR REVIEW ALL MEDS BY PRESCRIBER/CLIN PHARMACIST DOCUMENTED: ICD-10-PCS | Mod: CPTII,S$GLB,, | Performed by: OPTOMETRIST

## 2021-09-23 PROCEDURE — 92015 DETERMINE REFRACTIVE STATE: CPT | Mod: S$GLB,,, | Performed by: OPTOMETRIST

## 2021-09-23 PROCEDURE — 1101F PT FALLS ASSESS-DOCD LE1/YR: CPT | Mod: CPTII,S$GLB,, | Performed by: OPTOMETRIST

## 2021-09-27 ENCOUNTER — OFFICE VISIT (OUTPATIENT)
Dept: INTERNAL MEDICINE | Facility: CLINIC | Age: 81
End: 2021-09-27
Payer: MEDICARE

## 2021-09-27 ENCOUNTER — TELEPHONE (OUTPATIENT)
Dept: INTERNAL MEDICINE | Facility: CLINIC | Age: 81
End: 2021-09-27

## 2021-09-27 VITALS
HEIGHT: 63 IN | HEART RATE: 67 BPM | BODY MASS INDEX: 26.75 KG/M2 | DIASTOLIC BLOOD PRESSURE: 50 MMHG | WEIGHT: 151 LBS | SYSTOLIC BLOOD PRESSURE: 130 MMHG | OXYGEN SATURATION: 98 %

## 2021-09-27 DIAGNOSIS — I50.43 ACUTE ON CHRONIC COMBINED SYSTOLIC AND DIASTOLIC HEART FAILURE: Primary | ICD-10-CM

## 2021-09-27 PROCEDURE — 99214 PR OFFICE/OUTPT VISIT, EST, LEVL IV, 30-39 MIN: ICD-10-PCS | Mod: S$GLB,,, | Performed by: INTERNAL MEDICINE

## 2021-09-27 PROCEDURE — 3078F DIAST BP <80 MM HG: CPT | Mod: CPTII,S$GLB,, | Performed by: INTERNAL MEDICINE

## 2021-09-27 PROCEDURE — 3075F SYST BP GE 130 - 139MM HG: CPT | Mod: CPTII,S$GLB,, | Performed by: INTERNAL MEDICINE

## 2021-09-27 PROCEDURE — 99999 PR PBB SHADOW E&M-EST. PATIENT-LVL III: ICD-10-PCS | Mod: PBBFAC,,, | Performed by: INTERNAL MEDICINE

## 2021-09-27 PROCEDURE — 1101F PR PT FALLS ASSESS DOC 0-1 FALLS W/OUT INJ PAST YR: ICD-10-PCS | Mod: CPTII,S$GLB,, | Performed by: INTERNAL MEDICINE

## 2021-09-27 PROCEDURE — 3288F FALL RISK ASSESSMENT DOCD: CPT | Mod: CPTII,S$GLB,, | Performed by: INTERNAL MEDICINE

## 2021-09-27 PROCEDURE — 99214 OFFICE O/P EST MOD 30 MIN: CPT | Mod: S$GLB,,, | Performed by: INTERNAL MEDICINE

## 2021-09-27 PROCEDURE — 99999 PR PBB SHADOW E&M-EST. PATIENT-LVL III: CPT | Mod: PBBFAC,,, | Performed by: INTERNAL MEDICINE

## 2021-09-27 PROCEDURE — 1126F AMNT PAIN NOTED NONE PRSNT: CPT | Mod: CPTII,S$GLB,, | Performed by: INTERNAL MEDICINE

## 2021-09-27 PROCEDURE — 3288F PR FALLS RISK ASSESSMENT DOCUMENTED: ICD-10-PCS | Mod: CPTII,S$GLB,, | Performed by: INTERNAL MEDICINE

## 2021-09-27 PROCEDURE — 1126F PR PAIN SEVERITY QUANTIFIED, NO PAIN PRESENT: ICD-10-PCS | Mod: CPTII,S$GLB,, | Performed by: INTERNAL MEDICINE

## 2021-09-27 PROCEDURE — 3075F PR MOST RECENT SYSTOLIC BLOOD PRESS GE 130-139MM HG: ICD-10-PCS | Mod: CPTII,S$GLB,, | Performed by: INTERNAL MEDICINE

## 2021-09-27 PROCEDURE — 1101F PT FALLS ASSESS-DOCD LE1/YR: CPT | Mod: CPTII,S$GLB,, | Performed by: INTERNAL MEDICINE

## 2021-09-27 PROCEDURE — 3078F PR MOST RECENT DIASTOLIC BLOOD PRESSURE < 80 MM HG: ICD-10-PCS | Mod: CPTII,S$GLB,, | Performed by: INTERNAL MEDICINE

## 2021-09-30 ENCOUNTER — PATIENT MESSAGE (OUTPATIENT)
Dept: ENDOCRINOLOGY | Facility: CLINIC | Age: 81
End: 2021-09-30

## 2021-09-30 ENCOUNTER — PATIENT OUTREACH (OUTPATIENT)
Dept: ADMINISTRATIVE | Facility: OTHER | Age: 81
End: 2021-09-30

## 2021-10-01 ENCOUNTER — OFFICE VISIT (OUTPATIENT)
Dept: ENDOCRINOLOGY | Facility: CLINIC | Age: 81
End: 2021-10-01
Payer: MEDICARE

## 2021-10-01 VITALS
WEIGHT: 150.69 LBS | SYSTOLIC BLOOD PRESSURE: 130 MMHG | HEIGHT: 63 IN | DIASTOLIC BLOOD PRESSURE: 60 MMHG | HEART RATE: 76 BPM | OXYGEN SATURATION: 97 % | BODY MASS INDEX: 26.7 KG/M2

## 2021-10-01 DIAGNOSIS — E11.65 UNCONTROLLED TYPE 2 DIABETES MELLITUS WITH HYPERGLYCEMIA: ICD-10-CM

## 2021-10-01 DIAGNOSIS — Z79.4 CONTROLLED TYPE 2 DIABETES MELLITUS WITH BOTH EYES AFFECTED BY MILD NONPROLIFERATIVE RETINOPATHY AND MACULAR EDEMA, WITH LONG-TERM CURRENT USE OF INSULIN: Primary | ICD-10-CM

## 2021-10-01 DIAGNOSIS — E11.42 DIABETIC POLYNEUROPATHY ASSOCIATED WITH TYPE 2 DIABETES MELLITUS: ICD-10-CM

## 2021-10-01 DIAGNOSIS — E27.8 ADRENAL CORTICAL NODULE: ICD-10-CM

## 2021-10-01 DIAGNOSIS — E13.3299 MILD NONPROLIFERATIVE RETINOPATHY DUE TO SECONDARY DIABETES: ICD-10-CM

## 2021-10-01 DIAGNOSIS — E11.3213 CONTROLLED TYPE 2 DIABETES MELLITUS WITH BOTH EYES AFFECTED BY MILD NONPROLIFERATIVE RETINOPATHY AND MACULAR EDEMA, WITH LONG-TERM CURRENT USE OF INSULIN: Primary | ICD-10-CM

## 2021-10-01 DIAGNOSIS — E11.69 HYPERLIPIDEMIA ASSOCIATED WITH TYPE 2 DIABETES MELLITUS: ICD-10-CM

## 2021-10-01 DIAGNOSIS — N18.4 CKD (CHRONIC KIDNEY DISEASE) STAGE 4, GFR 15-29 ML/MIN: ICD-10-CM

## 2021-10-01 DIAGNOSIS — M81.0 OSTEOPOROSIS, UNSPECIFIED OSTEOPOROSIS TYPE, UNSPECIFIED PATHOLOGICAL FRACTURE PRESENCE: ICD-10-CM

## 2021-10-01 DIAGNOSIS — I15.2 HYPERTENSION ASSOCIATED WITH DIABETES: ICD-10-CM

## 2021-10-01 DIAGNOSIS — E04.2 NONTOXIC MULTINODULAR GOITER: ICD-10-CM

## 2021-10-01 DIAGNOSIS — E78.5 HYPERLIPIDEMIA ASSOCIATED WITH TYPE 2 DIABETES MELLITUS: ICD-10-CM

## 2021-10-01 DIAGNOSIS — E11.59 HYPERTENSION ASSOCIATED WITH DIABETES: ICD-10-CM

## 2021-10-01 PROCEDURE — 99499 UNLISTED E&M SERVICE: CPT | Mod: S$GLB,,, | Performed by: NURSE PRACTITIONER

## 2021-10-01 PROCEDURE — 1160F PR REVIEW ALL MEDS BY PRESCRIBER/CLIN PHARMACIST DOCUMENTED: ICD-10-PCS | Mod: CPTII,S$GLB,, | Performed by: NURSE PRACTITIONER

## 2021-10-01 PROCEDURE — 1101F PR PT FALLS ASSESS DOC 0-1 FALLS W/OUT INJ PAST YR: ICD-10-PCS | Mod: CPTII,S$GLB,, | Performed by: NURSE PRACTITIONER

## 2021-10-01 PROCEDURE — 3288F FALL RISK ASSESSMENT DOCD: CPT | Mod: CPTII,S$GLB,, | Performed by: NURSE PRACTITIONER

## 2021-10-01 PROCEDURE — 99214 PR OFFICE/OUTPT VISIT, EST, LEVL IV, 30-39 MIN: ICD-10-PCS | Mod: S$GLB,,, | Performed by: NURSE PRACTITIONER

## 2021-10-01 PROCEDURE — 1159F PR MEDICATION LIST DOCUMENTED IN MEDICAL RECORD: ICD-10-PCS | Mod: CPTII,S$GLB,, | Performed by: NURSE PRACTITIONER

## 2021-10-01 PROCEDURE — 1160F RVW MEDS BY RX/DR IN RCRD: CPT | Mod: CPTII,S$GLB,, | Performed by: NURSE PRACTITIONER

## 2021-10-01 PROCEDURE — 3075F SYST BP GE 130 - 139MM HG: CPT | Mod: CPTII,S$GLB,, | Performed by: NURSE PRACTITIONER

## 2021-10-01 PROCEDURE — 1126F AMNT PAIN NOTED NONE PRSNT: CPT | Mod: CPTII,S$GLB,, | Performed by: NURSE PRACTITIONER

## 2021-10-01 PROCEDURE — 1159F MED LIST DOCD IN RCRD: CPT | Mod: CPTII,S$GLB,, | Performed by: NURSE PRACTITIONER

## 2021-10-01 PROCEDURE — 3078F DIAST BP <80 MM HG: CPT | Mod: CPTII,S$GLB,, | Performed by: NURSE PRACTITIONER

## 2021-10-01 PROCEDURE — 3288F PR FALLS RISK ASSESSMENT DOCUMENTED: ICD-10-PCS | Mod: CPTII,S$GLB,, | Performed by: NURSE PRACTITIONER

## 2021-10-01 PROCEDURE — 3078F PR MOST RECENT DIASTOLIC BLOOD PRESSURE < 80 MM HG: ICD-10-PCS | Mod: CPTII,S$GLB,, | Performed by: NURSE PRACTITIONER

## 2021-10-01 PROCEDURE — 99214 OFFICE O/P EST MOD 30 MIN: CPT | Mod: S$GLB,,, | Performed by: NURSE PRACTITIONER

## 2021-10-01 PROCEDURE — 99999 PR PBB SHADOW E&M-EST. PATIENT-LVL V: CPT | Mod: PBBFAC,,, | Performed by: NURSE PRACTITIONER

## 2021-10-01 PROCEDURE — 99499 RISK ADDL DX/OHS AUDIT: ICD-10-PCS | Mod: S$GLB,,, | Performed by: NURSE PRACTITIONER

## 2021-10-01 PROCEDURE — 1101F PT FALLS ASSESS-DOCD LE1/YR: CPT | Mod: CPTII,S$GLB,, | Performed by: NURSE PRACTITIONER

## 2021-10-01 PROCEDURE — 99999 PR PBB SHADOW E&M-EST. PATIENT-LVL V: ICD-10-PCS | Mod: PBBFAC,,, | Performed by: NURSE PRACTITIONER

## 2021-10-01 PROCEDURE — 1126F PR PAIN SEVERITY QUANTIFIED, NO PAIN PRESENT: ICD-10-PCS | Mod: CPTII,S$GLB,, | Performed by: NURSE PRACTITIONER

## 2021-10-01 PROCEDURE — 3075F PR MOST RECENT SYSTOLIC BLOOD PRESS GE 130-139MM HG: ICD-10-PCS | Mod: CPTII,S$GLB,, | Performed by: NURSE PRACTITIONER

## 2021-10-01 RX ORDER — ALBUTEROL SULFATE 0.83 MG/ML
SOLUTION RESPIRATORY (INHALATION)
COMMUNITY
Start: 2021-07-23 | End: 2022-01-25

## 2021-10-01 RX ORDER — INSULIN GLARGINE 100 [IU]/ML
28 INJECTION, SOLUTION SUBCUTANEOUS DAILY
Qty: 2 BOX | Refills: 6 | Status: SHIPPED | OUTPATIENT
Start: 2021-10-01 | End: 2021-10-01 | Stop reason: SDUPTHER

## 2021-10-01 RX ORDER — INSULIN GLARGINE 100 [IU]/ML
28 INJECTION, SOLUTION SUBCUTANEOUS DAILY
Qty: 2 BOX | Refills: 6 | Status: SHIPPED | OUTPATIENT
Start: 2021-10-01 | End: 2022-02-01

## 2021-10-04 ENCOUNTER — OFFICE VISIT (OUTPATIENT)
Dept: INTERNAL MEDICINE | Facility: CLINIC | Age: 81
End: 2021-10-04
Payer: MEDICARE

## 2021-10-04 ENCOUNTER — LAB VISIT (OUTPATIENT)
Dept: LAB | Facility: HOSPITAL | Age: 81
End: 2021-10-04
Attending: INTERNAL MEDICINE
Payer: MEDICARE

## 2021-10-04 VITALS
SYSTOLIC BLOOD PRESSURE: 148 MMHG | OXYGEN SATURATION: 98 % | DIASTOLIC BLOOD PRESSURE: 56 MMHG | HEART RATE: 74 BPM | WEIGHT: 150.81 LBS | BODY MASS INDEX: 26.72 KG/M2 | HEIGHT: 63 IN

## 2021-10-04 DIAGNOSIS — I50.20 SYSTOLIC CONGESTIVE HEART FAILURE, UNSPECIFIED HF CHRONICITY: Primary | ICD-10-CM

## 2021-10-04 DIAGNOSIS — I50.20 SYSTOLIC CONGESTIVE HEART FAILURE, UNSPECIFIED HF CHRONICITY: ICD-10-CM

## 2021-10-04 LAB
ANION GAP SERPL CALC-SCNC: 10 MMOL/L (ref 8–16)
BUN SERPL-MCNC: 44 MG/DL (ref 8–23)
CALCIUM SERPL-MCNC: 9.3 MG/DL (ref 8.7–10.5)
CHLORIDE SERPL-SCNC: 106 MMOL/L (ref 95–110)
CO2 SERPL-SCNC: 20 MMOL/L (ref 23–29)
CREAT SERPL-MCNC: 2 MG/DL (ref 0.5–1.4)
EST. GFR  (AFRICAN AMERICAN): 26.2 ML/MIN/1.73 M^2
EST. GFR  (NON AFRICAN AMERICAN): 22.7 ML/MIN/1.73 M^2
GLUCOSE SERPL-MCNC: 191 MG/DL (ref 70–110)
POTASSIUM SERPL-SCNC: 5.2 MMOL/L (ref 3.5–5.1)
SODIUM SERPL-SCNC: 136 MMOL/L (ref 136–145)

## 2021-10-04 PROCEDURE — 3078F PR MOST RECENT DIASTOLIC BLOOD PRESSURE < 80 MM HG: ICD-10-PCS | Mod: CPTII,S$GLB,, | Performed by: INTERNAL MEDICINE

## 2021-10-04 PROCEDURE — 99999 PR PBB SHADOW E&M-EST. PATIENT-LVL V: CPT | Mod: PBBFAC,,, | Performed by: INTERNAL MEDICINE

## 2021-10-04 PROCEDURE — 99999 PR PBB SHADOW E&M-EST. PATIENT-LVL V: ICD-10-PCS | Mod: PBBFAC,,, | Performed by: INTERNAL MEDICINE

## 2021-10-04 PROCEDURE — 99213 PR OFFICE/OUTPT VISIT, EST, LEVL III, 20-29 MIN: ICD-10-PCS | Mod: S$GLB,,, | Performed by: INTERNAL MEDICINE

## 2021-10-04 PROCEDURE — 1101F PT FALLS ASSESS-DOCD LE1/YR: CPT | Mod: CPTII,S$GLB,, | Performed by: INTERNAL MEDICINE

## 2021-10-04 PROCEDURE — 1159F MED LIST DOCD IN RCRD: CPT | Mod: CPTII,S$GLB,, | Performed by: INTERNAL MEDICINE

## 2021-10-04 PROCEDURE — 3077F SYST BP >= 140 MM HG: CPT | Mod: CPTII,S$GLB,, | Performed by: INTERNAL MEDICINE

## 2021-10-04 PROCEDURE — 1101F PR PT FALLS ASSESS DOC 0-1 FALLS W/OUT INJ PAST YR: ICD-10-PCS | Mod: CPTII,S$GLB,, | Performed by: INTERNAL MEDICINE

## 2021-10-04 PROCEDURE — 3288F FALL RISK ASSESSMENT DOCD: CPT | Mod: CPTII,S$GLB,, | Performed by: INTERNAL MEDICINE

## 2021-10-04 PROCEDURE — 1126F PR PAIN SEVERITY QUANTIFIED, NO PAIN PRESENT: ICD-10-PCS | Mod: CPTII,S$GLB,, | Performed by: INTERNAL MEDICINE

## 2021-10-04 PROCEDURE — 1126F AMNT PAIN NOTED NONE PRSNT: CPT | Mod: CPTII,S$GLB,, | Performed by: INTERNAL MEDICINE

## 2021-10-04 PROCEDURE — 3288F PR FALLS RISK ASSESSMENT DOCUMENTED: ICD-10-PCS | Mod: CPTII,S$GLB,, | Performed by: INTERNAL MEDICINE

## 2021-10-04 PROCEDURE — 3078F DIAST BP <80 MM HG: CPT | Mod: CPTII,S$GLB,, | Performed by: INTERNAL MEDICINE

## 2021-10-04 PROCEDURE — 36415 COLL VENOUS BLD VENIPUNCTURE: CPT | Performed by: INTERNAL MEDICINE

## 2021-10-04 PROCEDURE — 99213 OFFICE O/P EST LOW 20 MIN: CPT | Mod: S$GLB,,, | Performed by: INTERNAL MEDICINE

## 2021-10-04 PROCEDURE — 3077F PR MOST RECENT SYSTOLIC BLOOD PRESSURE >= 140 MM HG: ICD-10-PCS | Mod: CPTII,S$GLB,, | Performed by: INTERNAL MEDICINE

## 2021-10-04 PROCEDURE — 1159F PR MEDICATION LIST DOCUMENTED IN MEDICAL RECORD: ICD-10-PCS | Mod: CPTII,S$GLB,, | Performed by: INTERNAL MEDICINE

## 2021-10-04 PROCEDURE — 80048 BASIC METABOLIC PNL TOTAL CA: CPT | Performed by: INTERNAL MEDICINE

## 2021-10-04 RX ORDER — FUROSEMIDE 40 MG/1
40 TABLET ORAL DAILY
Qty: 30 TABLET | Refills: 1 | Status: SHIPPED | OUTPATIENT
Start: 2021-10-04 | End: 2021-10-26 | Stop reason: DRUGHIGH

## 2021-10-05 ENCOUNTER — TELEPHONE (OUTPATIENT)
Dept: ENDOCRINOLOGY | Facility: CLINIC | Age: 81
End: 2021-10-05

## 2021-10-05 ENCOUNTER — CLINICAL SUPPORT (OUTPATIENT)
Dept: OTOLARYNGOLOGY | Facility: CLINIC | Age: 81
End: 2021-10-05
Payer: MEDICARE

## 2021-10-05 DIAGNOSIS — Z46.1 ENCOUNTER FOR HEARING AID FITTING OF BOTH EARS: Primary | ICD-10-CM

## 2021-10-05 PROCEDURE — 99499 UNLISTED E&M SERVICE: CPT | Mod: S$GLB,,, | Performed by: AUDIOLOGIST-HEARING AID FITTER

## 2021-10-05 PROCEDURE — 99499 NO LOS: ICD-10-PCS | Mod: S$GLB,,, | Performed by: AUDIOLOGIST-HEARING AID FITTER

## 2021-10-06 ENCOUNTER — OFFICE VISIT (OUTPATIENT)
Dept: OTOLARYNGOLOGY | Facility: CLINIC | Age: 81
End: 2021-10-06
Payer: MEDICARE

## 2021-10-06 VITALS
DIASTOLIC BLOOD PRESSURE: 52 MMHG | HEIGHT: 63 IN | SYSTOLIC BLOOD PRESSURE: 116 MMHG | BODY MASS INDEX: 25.94 KG/M2 | HEART RATE: 56 BPM | WEIGHT: 146.38 LBS

## 2021-10-06 DIAGNOSIS — H90.A21 SENSORINEURAL HEARING LOSS (SNHL) OF RIGHT EAR WITH RESTRICTED HEARING OF LEFT EAR: Chronic | ICD-10-CM

## 2021-10-06 DIAGNOSIS — H90.A32 MIXED CONDUCTIVE AND SENSORINEURAL HEARING LOSS OF LEFT EAR WITH RESTRICTED HEARING OF RIGHT EAR: Chronic | ICD-10-CM

## 2021-10-06 DIAGNOSIS — H60.312 CHRONIC DIFFUSE OTITIS EXTERNA OF LEFT EAR: Primary | Chronic | ICD-10-CM

## 2021-10-06 PROCEDURE — 3078F PR MOST RECENT DIASTOLIC BLOOD PRESSURE < 80 MM HG: ICD-10-PCS | Mod: CPTII,S$GLB,, | Performed by: OTOLARYNGOLOGY

## 2021-10-06 PROCEDURE — 87077 CULTURE AEROBIC IDENTIFY: CPT | Performed by: OTOLARYNGOLOGY

## 2021-10-06 PROCEDURE — 1126F AMNT PAIN NOTED NONE PRSNT: CPT | Mod: CPTII,S$GLB,, | Performed by: OTOLARYNGOLOGY

## 2021-10-06 PROCEDURE — 99214 OFFICE O/P EST MOD 30 MIN: CPT | Mod: S$GLB,,, | Performed by: OTOLARYNGOLOGY

## 2021-10-06 PROCEDURE — 1159F MED LIST DOCD IN RCRD: CPT | Mod: CPTII,S$GLB,, | Performed by: OTOLARYNGOLOGY

## 2021-10-06 PROCEDURE — 3288F FALL RISK ASSESSMENT DOCD: CPT | Mod: CPTII,S$GLB,, | Performed by: OTOLARYNGOLOGY

## 2021-10-06 PROCEDURE — 1101F PR PT FALLS ASSESS DOC 0-1 FALLS W/OUT INJ PAST YR: ICD-10-PCS | Mod: CPTII,S$GLB,, | Performed by: OTOLARYNGOLOGY

## 2021-10-06 PROCEDURE — 87186 SC STD MICRODIL/AGAR DIL: CPT | Performed by: OTOLARYNGOLOGY

## 2021-10-06 PROCEDURE — 3074F SYST BP LT 130 MM HG: CPT | Mod: CPTII,S$GLB,, | Performed by: OTOLARYNGOLOGY

## 2021-10-06 PROCEDURE — 99999 PR PBB SHADOW E&M-EST. PATIENT-LVL IV: CPT | Mod: PBBFAC,,, | Performed by: OTOLARYNGOLOGY

## 2021-10-06 PROCEDURE — 99214 PR OFFICE/OUTPT VISIT, EST, LEVL IV, 30-39 MIN: ICD-10-PCS | Mod: S$GLB,,, | Performed by: OTOLARYNGOLOGY

## 2021-10-06 PROCEDURE — 87070 CULTURE OTHR SPECIMN AEROBIC: CPT | Performed by: OTOLARYNGOLOGY

## 2021-10-06 PROCEDURE — 3288F PR FALLS RISK ASSESSMENT DOCUMENTED: ICD-10-PCS | Mod: CPTII,S$GLB,, | Performed by: OTOLARYNGOLOGY

## 2021-10-06 PROCEDURE — 3078F DIAST BP <80 MM HG: CPT | Mod: CPTII,S$GLB,, | Performed by: OTOLARYNGOLOGY

## 2021-10-06 PROCEDURE — 99999 PR PBB SHADOW E&M-EST. PATIENT-LVL IV: ICD-10-PCS | Mod: PBBFAC,,, | Performed by: OTOLARYNGOLOGY

## 2021-10-06 PROCEDURE — 3074F PR MOST RECENT SYSTOLIC BLOOD PRESSURE < 130 MM HG: ICD-10-PCS | Mod: CPTII,S$GLB,, | Performed by: OTOLARYNGOLOGY

## 2021-10-06 PROCEDURE — 1159F PR MEDICATION LIST DOCUMENTED IN MEDICAL RECORD: ICD-10-PCS | Mod: CPTII,S$GLB,, | Performed by: OTOLARYNGOLOGY

## 2021-10-06 PROCEDURE — 1101F PT FALLS ASSESS-DOCD LE1/YR: CPT | Mod: CPTII,S$GLB,, | Performed by: OTOLARYNGOLOGY

## 2021-10-06 PROCEDURE — 1126F PR PAIN SEVERITY QUANTIFIED, NO PAIN PRESENT: ICD-10-PCS | Mod: CPTII,S$GLB,, | Performed by: OTOLARYNGOLOGY

## 2021-10-06 RX ORDER — SULFAMETHOXAZOLE AND TRIMETHOPRIM 800; 160 MG/1; MG/1
1 TABLET ORAL 2 TIMES DAILY
Qty: 20 TABLET | Refills: 0 | Status: SHIPPED | OUTPATIENT
Start: 2021-10-06 | End: 2021-10-16

## 2021-10-06 RX ORDER — MUPIROCIN 20 MG/G
OINTMENT TOPICAL 2 TIMES DAILY
Qty: 15 G | Refills: 0 | Status: SHIPPED | OUTPATIENT
Start: 2021-10-06 | End: 2021-10-16

## 2021-10-07 ENCOUNTER — OFFICE VISIT (OUTPATIENT)
Dept: INTERNAL MEDICINE | Facility: CLINIC | Age: 81
End: 2021-10-07
Payer: MEDICARE

## 2021-10-07 ENCOUNTER — TELEPHONE (OUTPATIENT)
Dept: OTOLARYNGOLOGY | Facility: CLINIC | Age: 81
End: 2021-10-07

## 2021-10-07 VITALS
BODY MASS INDEX: 25.5 KG/M2 | DIASTOLIC BLOOD PRESSURE: 56 MMHG | HEIGHT: 63 IN | RESPIRATION RATE: 16 BRPM | OXYGEN SATURATION: 97 % | WEIGHT: 143.94 LBS | SYSTOLIC BLOOD PRESSURE: 130 MMHG | TEMPERATURE: 98 F | HEART RATE: 72 BPM

## 2021-10-07 DIAGNOSIS — I50.33 ACUTE ON CHRONIC DIASTOLIC CONGESTIVE HEART FAILURE: Primary | ICD-10-CM

## 2021-10-07 PROCEDURE — 99999 PR PBB SHADOW E&M-EST. PATIENT-LVL V: CPT | Mod: PBBFAC,,, | Performed by: INTERNAL MEDICINE

## 2021-10-07 PROCEDURE — 1101F PR PT FALLS ASSESS DOC 0-1 FALLS W/OUT INJ PAST YR: ICD-10-PCS | Mod: CPTII,S$GLB,, | Performed by: INTERNAL MEDICINE

## 2021-10-07 PROCEDURE — 3288F PR FALLS RISK ASSESSMENT DOCUMENTED: ICD-10-PCS | Mod: CPTII,S$GLB,, | Performed by: INTERNAL MEDICINE

## 2021-10-07 PROCEDURE — 3075F PR MOST RECENT SYSTOLIC BLOOD PRESS GE 130-139MM HG: ICD-10-PCS | Mod: CPTII,S$GLB,, | Performed by: INTERNAL MEDICINE

## 2021-10-07 PROCEDURE — 1101F PT FALLS ASSESS-DOCD LE1/YR: CPT | Mod: CPTII,S$GLB,, | Performed by: INTERNAL MEDICINE

## 2021-10-07 PROCEDURE — 3075F SYST BP GE 130 - 139MM HG: CPT | Mod: CPTII,S$GLB,, | Performed by: INTERNAL MEDICINE

## 2021-10-07 PROCEDURE — 99213 OFFICE O/P EST LOW 20 MIN: CPT | Mod: S$GLB,,, | Performed by: INTERNAL MEDICINE

## 2021-10-07 PROCEDURE — 1159F MED LIST DOCD IN RCRD: CPT | Mod: CPTII,S$GLB,, | Performed by: INTERNAL MEDICINE

## 2021-10-07 PROCEDURE — 1159F PR MEDICATION LIST DOCUMENTED IN MEDICAL RECORD: ICD-10-PCS | Mod: CPTII,S$GLB,, | Performed by: INTERNAL MEDICINE

## 2021-10-07 PROCEDURE — 3288F FALL RISK ASSESSMENT DOCD: CPT | Mod: CPTII,S$GLB,, | Performed by: INTERNAL MEDICINE

## 2021-10-07 PROCEDURE — 3078F PR MOST RECENT DIASTOLIC BLOOD PRESSURE < 80 MM HG: ICD-10-PCS | Mod: CPTII,S$GLB,, | Performed by: INTERNAL MEDICINE

## 2021-10-07 PROCEDURE — 3078F DIAST BP <80 MM HG: CPT | Mod: CPTII,S$GLB,, | Performed by: INTERNAL MEDICINE

## 2021-10-07 PROCEDURE — 1126F AMNT PAIN NOTED NONE PRSNT: CPT | Mod: CPTII,S$GLB,, | Performed by: INTERNAL MEDICINE

## 2021-10-07 PROCEDURE — 99213 PR OFFICE/OUTPT VISIT, EST, LEVL III, 20-29 MIN: ICD-10-PCS | Mod: S$GLB,,, | Performed by: INTERNAL MEDICINE

## 2021-10-07 PROCEDURE — 99999 PR PBB SHADOW E&M-EST. PATIENT-LVL V: ICD-10-PCS | Mod: PBBFAC,,, | Performed by: INTERNAL MEDICINE

## 2021-10-07 PROCEDURE — 1126F PR PAIN SEVERITY QUANTIFIED, NO PAIN PRESENT: ICD-10-PCS | Mod: CPTII,S$GLB,, | Performed by: INTERNAL MEDICINE

## 2021-10-08 ENCOUNTER — OFFICE VISIT (OUTPATIENT)
Dept: OPHTHALMOLOGY | Facility: CLINIC | Age: 81
End: 2021-10-08
Payer: MEDICARE

## 2021-10-08 DIAGNOSIS — E11.3213 CONTROLLED TYPE 2 DIABETES MELLITUS WITH BOTH EYES AFFECTED BY MILD NONPROLIFERATIVE RETINOPATHY AND MACULAR EDEMA, WITH LONG-TERM CURRENT USE OF INSULIN: Primary | ICD-10-CM

## 2021-10-08 DIAGNOSIS — H35.033 HYPERTENSIVE RETINOPATHY, BILATERAL: ICD-10-CM

## 2021-10-08 DIAGNOSIS — Z79.4 CONTROLLED TYPE 2 DIABETES MELLITUS WITH BOTH EYES AFFECTED BY MILD NONPROLIFERATIVE RETINOPATHY AND MACULAR EDEMA, WITH LONG-TERM CURRENT USE OF INSULIN: Primary | ICD-10-CM

## 2021-10-08 PROCEDURE — 99999 PR PBB SHADOW E&M-EST. PATIENT-LVL IV: ICD-10-PCS | Mod: PBBFAC,,, | Performed by: OPHTHALMOLOGY

## 2021-10-08 PROCEDURE — 1159F PR MEDICATION LIST DOCUMENTED IN MEDICAL RECORD: ICD-10-PCS | Mod: CPTII,S$GLB,, | Performed by: OPHTHALMOLOGY

## 2021-10-08 PROCEDURE — 1126F AMNT PAIN NOTED NONE PRSNT: CPT | Mod: CPTII,S$GLB,, | Performed by: OPHTHALMOLOGY

## 2021-10-08 PROCEDURE — 92201 OPSCPY EXTND RTA DRAW UNI/BI: CPT | Mod: S$GLB,,, | Performed by: OPHTHALMOLOGY

## 2021-10-08 PROCEDURE — 1159F MED LIST DOCD IN RCRD: CPT | Mod: CPTII,S$GLB,, | Performed by: OPHTHALMOLOGY

## 2021-10-08 PROCEDURE — 92134 CPTRZ OPH DX IMG PST SGM RTA: CPT | Mod: S$GLB,,, | Performed by: OPHTHALMOLOGY

## 2021-10-08 PROCEDURE — 92201 PR OPHTHALMOSCOPY, EXT, W/RET DRAW/SCLERAL DEPR, I&R, UNI/BI: ICD-10-PCS | Mod: S$GLB,,, | Performed by: OPHTHALMOLOGY

## 2021-10-08 PROCEDURE — 92134 POSTERIOR SEGMENT OCT RETINA (OCULAR COHERENCE TOMOGRAPHY)-BOTH EYES: ICD-10-PCS | Mod: S$GLB,,, | Performed by: OPHTHALMOLOGY

## 2021-10-08 PROCEDURE — 3288F FALL RISK ASSESSMENT DOCD: CPT | Mod: CPTII,S$GLB,, | Performed by: OPHTHALMOLOGY

## 2021-10-08 PROCEDURE — 92014 PR EYE EXAM, EST PATIENT,COMPREHESV: ICD-10-PCS | Mod: S$GLB,,, | Performed by: OPHTHALMOLOGY

## 2021-10-08 PROCEDURE — 99999 PR PBB SHADOW E&M-EST. PATIENT-LVL IV: CPT | Mod: PBBFAC,,, | Performed by: OPHTHALMOLOGY

## 2021-10-08 PROCEDURE — 1160F RVW MEDS BY RX/DR IN RCRD: CPT | Mod: CPTII,S$GLB,, | Performed by: OPHTHALMOLOGY

## 2021-10-08 PROCEDURE — 1101F PR PT FALLS ASSESS DOC 0-1 FALLS W/OUT INJ PAST YR: ICD-10-PCS | Mod: CPTII,S$GLB,, | Performed by: OPHTHALMOLOGY

## 2021-10-08 PROCEDURE — 92014 COMPRE OPH EXAM EST PT 1/>: CPT | Mod: S$GLB,,, | Performed by: OPHTHALMOLOGY

## 2021-10-08 PROCEDURE — 1101F PT FALLS ASSESS-DOCD LE1/YR: CPT | Mod: CPTII,S$GLB,, | Performed by: OPHTHALMOLOGY

## 2021-10-08 PROCEDURE — 3288F PR FALLS RISK ASSESSMENT DOCUMENTED: ICD-10-PCS | Mod: CPTII,S$GLB,, | Performed by: OPHTHALMOLOGY

## 2021-10-08 PROCEDURE — 1160F PR REVIEW ALL MEDS BY PRESCRIBER/CLIN PHARMACIST DOCUMENTED: ICD-10-PCS | Mod: CPTII,S$GLB,, | Performed by: OPHTHALMOLOGY

## 2021-10-08 PROCEDURE — 1126F PR PAIN SEVERITY QUANTIFIED, NO PAIN PRESENT: ICD-10-PCS | Mod: CPTII,S$GLB,, | Performed by: OPHTHALMOLOGY

## 2021-10-09 LAB — BACTERIA SPEC AEROBE CULT: ABNORMAL

## 2021-10-12 ENCOUNTER — TELEPHONE (OUTPATIENT)
Dept: INTERNAL MEDICINE | Facility: CLINIC | Age: 81
End: 2021-10-12

## 2021-10-12 ENCOUNTER — OFFICE VISIT (OUTPATIENT)
Dept: INTERNAL MEDICINE | Facility: CLINIC | Age: 81
End: 2021-10-12
Payer: MEDICARE

## 2021-10-12 ENCOUNTER — LAB VISIT (OUTPATIENT)
Dept: LAB | Facility: HOSPITAL | Age: 81
End: 2021-10-12
Attending: INTERNAL MEDICINE
Payer: MEDICARE

## 2021-10-12 VITALS
WEIGHT: 147.25 LBS | SYSTOLIC BLOOD PRESSURE: 136 MMHG | DIASTOLIC BLOOD PRESSURE: 52 MMHG | HEIGHT: 63 IN | BODY MASS INDEX: 26.09 KG/M2 | OXYGEN SATURATION: 97 % | HEART RATE: 75 BPM

## 2021-10-12 DIAGNOSIS — N18.4 CKD (CHRONIC KIDNEY DISEASE) STAGE 4, GFR 15-29 ML/MIN: ICD-10-CM

## 2021-10-12 DIAGNOSIS — I25.5 CARDIOMYOPATHY, ISCHEMIC: ICD-10-CM

## 2021-10-12 DIAGNOSIS — I50.43 ACUTE ON CHRONIC COMBINED SYSTOLIC AND DIASTOLIC HEART FAILURE: Primary | ICD-10-CM

## 2021-10-12 DIAGNOSIS — I50.33 ACUTE ON CHRONIC DIASTOLIC CONGESTIVE HEART FAILURE: ICD-10-CM

## 2021-10-12 LAB
ANION GAP SERPL CALC-SCNC: 10 MMOL/L (ref 8–16)
BUN SERPL-MCNC: 59 MG/DL (ref 8–23)
CALCIUM SERPL-MCNC: 9 MG/DL (ref 8.7–10.5)
CHLORIDE SERPL-SCNC: 104 MMOL/L (ref 95–110)
CO2 SERPL-SCNC: 19 MMOL/L (ref 23–29)
CREAT SERPL-MCNC: 3.2 MG/DL (ref 0.5–1.4)
EST. GFR  (AFRICAN AMERICAN): 14.9 ML/MIN/1.73 M^2
EST. GFR  (NON AFRICAN AMERICAN): 12.9 ML/MIN/1.73 M^2
GLUCOSE SERPL-MCNC: 191 MG/DL (ref 70–110)
POTASSIUM SERPL-SCNC: 5.6 MMOL/L (ref 3.5–5.1)
SODIUM SERPL-SCNC: 133 MMOL/L (ref 136–145)

## 2021-10-12 PROCEDURE — 1101F PT FALLS ASSESS-DOCD LE1/YR: CPT | Mod: CPTII,S$GLB,, | Performed by: INTERNAL MEDICINE

## 2021-10-12 PROCEDURE — 99213 PR OFFICE/OUTPT VISIT, EST, LEVL III, 20-29 MIN: ICD-10-PCS | Mod: S$GLB,,, | Performed by: INTERNAL MEDICINE

## 2021-10-12 PROCEDURE — 80048 BASIC METABOLIC PNL TOTAL CA: CPT | Performed by: INTERNAL MEDICINE

## 2021-10-12 PROCEDURE — 99213 OFFICE O/P EST LOW 20 MIN: CPT | Mod: S$GLB,,, | Performed by: INTERNAL MEDICINE

## 2021-10-12 PROCEDURE — 3075F PR MOST RECENT SYSTOLIC BLOOD PRESS GE 130-139MM HG: ICD-10-PCS | Mod: CPTII,S$GLB,, | Performed by: INTERNAL MEDICINE

## 2021-10-12 PROCEDURE — 3288F FALL RISK ASSESSMENT DOCD: CPT | Mod: CPTII,S$GLB,, | Performed by: INTERNAL MEDICINE

## 2021-10-12 PROCEDURE — 3078F DIAST BP <80 MM HG: CPT | Mod: CPTII,S$GLB,, | Performed by: INTERNAL MEDICINE

## 2021-10-12 PROCEDURE — 1159F PR MEDICATION LIST DOCUMENTED IN MEDICAL RECORD: ICD-10-PCS | Mod: CPTII,S$GLB,, | Performed by: INTERNAL MEDICINE

## 2021-10-12 PROCEDURE — 36415 COLL VENOUS BLD VENIPUNCTURE: CPT | Performed by: INTERNAL MEDICINE

## 2021-10-12 PROCEDURE — 3078F PR MOST RECENT DIASTOLIC BLOOD PRESSURE < 80 MM HG: ICD-10-PCS | Mod: CPTII,S$GLB,, | Performed by: INTERNAL MEDICINE

## 2021-10-12 PROCEDURE — 3288F PR FALLS RISK ASSESSMENT DOCUMENTED: ICD-10-PCS | Mod: CPTII,S$GLB,, | Performed by: INTERNAL MEDICINE

## 2021-10-12 PROCEDURE — 1101F PR PT FALLS ASSESS DOC 0-1 FALLS W/OUT INJ PAST YR: ICD-10-PCS | Mod: CPTII,S$GLB,, | Performed by: INTERNAL MEDICINE

## 2021-10-12 PROCEDURE — 1126F PR PAIN SEVERITY QUANTIFIED, NO PAIN PRESENT: ICD-10-PCS | Mod: CPTII,S$GLB,, | Performed by: INTERNAL MEDICINE

## 2021-10-12 PROCEDURE — 1126F AMNT PAIN NOTED NONE PRSNT: CPT | Mod: CPTII,S$GLB,, | Performed by: INTERNAL MEDICINE

## 2021-10-12 PROCEDURE — 1159F MED LIST DOCD IN RCRD: CPT | Mod: CPTII,S$GLB,, | Performed by: INTERNAL MEDICINE

## 2021-10-12 PROCEDURE — 99999 PR PBB SHADOW E&M-EST. PATIENT-LVL V: ICD-10-PCS | Mod: PBBFAC,,, | Performed by: INTERNAL MEDICINE

## 2021-10-12 PROCEDURE — 99999 PR PBB SHADOW E&M-EST. PATIENT-LVL V: CPT | Mod: PBBFAC,,, | Performed by: INTERNAL MEDICINE

## 2021-10-12 PROCEDURE — 3075F SYST BP GE 130 - 139MM HG: CPT | Mod: CPTII,S$GLB,, | Performed by: INTERNAL MEDICINE

## 2021-10-13 ENCOUNTER — TELEPHONE (OUTPATIENT)
Dept: INTERNAL MEDICINE | Facility: CLINIC | Age: 81
End: 2021-10-13

## 2021-10-13 DIAGNOSIS — E87.5 SERUM POTASSIUM ELEVATED: Primary | ICD-10-CM

## 2021-10-14 ENCOUNTER — LAB VISIT (OUTPATIENT)
Dept: LAB | Facility: HOSPITAL | Age: 81
End: 2021-10-14
Attending: INTERNAL MEDICINE
Payer: MEDICARE

## 2021-10-14 DIAGNOSIS — E87.5 SERUM POTASSIUM ELEVATED: ICD-10-CM

## 2021-10-14 LAB
ANION GAP SERPL CALC-SCNC: 13 MMOL/L (ref 8–16)
BUN SERPL-MCNC: 60 MG/DL (ref 8–23)
CALCIUM SERPL-MCNC: 9.5 MG/DL (ref 8.7–10.5)
CHLORIDE SERPL-SCNC: 103 MMOL/L (ref 95–110)
CO2 SERPL-SCNC: 19 MMOL/L (ref 23–29)
CREAT SERPL-MCNC: 3 MG/DL (ref 0.5–1.4)
EST. GFR  (AFRICAN AMERICAN): 16.1 ML/MIN/1.73 M^2
EST. GFR  (NON AFRICAN AMERICAN): 13.9 ML/MIN/1.73 M^2
GLUCOSE SERPL-MCNC: 157 MG/DL (ref 70–110)
POTASSIUM SERPL-SCNC: 5.2 MMOL/L (ref 3.5–5.1)
SODIUM SERPL-SCNC: 135 MMOL/L (ref 136–145)

## 2021-10-14 PROCEDURE — 36415 COLL VENOUS BLD VENIPUNCTURE: CPT | Performed by: INTERNAL MEDICINE

## 2021-10-14 PROCEDURE — 80048 BASIC METABOLIC PNL TOTAL CA: CPT | Performed by: INTERNAL MEDICINE

## 2021-10-15 ENCOUNTER — TELEPHONE (OUTPATIENT)
Dept: INTERNAL MEDICINE | Facility: CLINIC | Age: 81
End: 2021-10-15

## 2021-10-15 DIAGNOSIS — R94.4 DECREASED GFR: Primary | ICD-10-CM

## 2021-10-18 ENCOUNTER — LAB VISIT (OUTPATIENT)
Dept: LAB | Facility: HOSPITAL | Age: 81
End: 2021-10-18
Attending: INTERNAL MEDICINE
Payer: MEDICARE

## 2021-10-18 DIAGNOSIS — R94.4 DECREASED GFR: ICD-10-CM

## 2021-10-18 LAB
ANION GAP SERPL CALC-SCNC: 10 MMOL/L (ref 8–16)
BUN SERPL-MCNC: 52 MG/DL (ref 8–23)
CALCIUM SERPL-MCNC: 9.1 MG/DL (ref 8.7–10.5)
CHLORIDE SERPL-SCNC: 103 MMOL/L (ref 95–110)
CO2 SERPL-SCNC: 19 MMOL/L (ref 23–29)
CREAT SERPL-MCNC: 2.1 MG/DL (ref 0.5–1.4)
EST. GFR  (AFRICAN AMERICAN): 24.7 ML/MIN/1.73 M^2
EST. GFR  (NON AFRICAN AMERICAN): 21.4 ML/MIN/1.73 M^2
GLUCOSE SERPL-MCNC: 118 MG/DL (ref 70–110)
POTASSIUM SERPL-SCNC: 4.7 MMOL/L (ref 3.5–5.1)
SODIUM SERPL-SCNC: 132 MMOL/L (ref 136–145)

## 2021-10-18 PROCEDURE — 36415 COLL VENOUS BLD VENIPUNCTURE: CPT | Mod: PO | Performed by: INTERNAL MEDICINE

## 2021-10-18 PROCEDURE — 80048 BASIC METABOLIC PNL TOTAL CA: CPT | Performed by: INTERNAL MEDICINE

## 2021-10-20 ENCOUNTER — OFFICE VISIT (OUTPATIENT)
Dept: OTOLARYNGOLOGY | Facility: CLINIC | Age: 81
End: 2021-10-20
Payer: MEDICARE

## 2021-10-20 DIAGNOSIS — H90.A21 SENSORINEURAL HEARING LOSS (SNHL) OF RIGHT EAR WITH RESTRICTED HEARING OF LEFT EAR: Chronic | ICD-10-CM

## 2021-10-20 DIAGNOSIS — H90.A32 MIXED CONDUCTIVE AND SENSORINEURAL HEARING LOSS OF LEFT EAR WITH RESTRICTED HEARING OF RIGHT EAR: Chronic | ICD-10-CM

## 2021-10-20 DIAGNOSIS — H60.312 CHRONIC DIFFUSE OTITIS EXTERNA OF LEFT EAR: Primary | ICD-10-CM

## 2021-10-20 PROCEDURE — 99213 PR OFFICE/OUTPT VISIT, EST, LEVL III, 20-29 MIN: ICD-10-PCS | Mod: S$GLB,,, | Performed by: OTOLARYNGOLOGY

## 2021-10-20 PROCEDURE — 1159F MED LIST DOCD IN RCRD: CPT | Mod: CPTII,S$GLB,, | Performed by: OTOLARYNGOLOGY

## 2021-10-20 PROCEDURE — 99213 OFFICE O/P EST LOW 20 MIN: CPT | Mod: S$GLB,,, | Performed by: OTOLARYNGOLOGY

## 2021-10-20 PROCEDURE — 99999 PR PBB SHADOW E&M-EST. PATIENT-LVL I: CPT | Mod: PBBFAC,,, | Performed by: OTOLARYNGOLOGY

## 2021-10-20 PROCEDURE — 1160F RVW MEDS BY RX/DR IN RCRD: CPT | Mod: CPTII,S$GLB,, | Performed by: OTOLARYNGOLOGY

## 2021-10-20 PROCEDURE — 1159F PR MEDICATION LIST DOCUMENTED IN MEDICAL RECORD: ICD-10-PCS | Mod: CPTII,S$GLB,, | Performed by: OTOLARYNGOLOGY

## 2021-10-20 PROCEDURE — 99999 PR PBB SHADOW E&M-EST. PATIENT-LVL I: ICD-10-PCS | Mod: PBBFAC,,, | Performed by: OTOLARYNGOLOGY

## 2021-10-20 PROCEDURE — 1160F PR REVIEW ALL MEDS BY PRESCRIBER/CLIN PHARMACIST DOCUMENTED: ICD-10-PCS | Mod: CPTII,S$GLB,, | Performed by: OTOLARYNGOLOGY

## 2021-10-26 ENCOUNTER — OFFICE VISIT (OUTPATIENT)
Dept: INTERNAL MEDICINE | Facility: CLINIC | Age: 81
End: 2021-10-26
Payer: MEDICARE

## 2021-10-26 ENCOUNTER — LAB VISIT (OUTPATIENT)
Dept: LAB | Facility: HOSPITAL | Age: 81
End: 2021-10-26
Attending: INTERNAL MEDICINE
Payer: MEDICARE

## 2021-10-26 VITALS
SYSTOLIC BLOOD PRESSURE: 138 MMHG | HEART RATE: 78 BPM | WEIGHT: 146.19 LBS | OXYGEN SATURATION: 97 % | HEIGHT: 63 IN | DIASTOLIC BLOOD PRESSURE: 44 MMHG | BODY MASS INDEX: 25.9 KG/M2

## 2021-10-26 DIAGNOSIS — I50.43 ACUTE ON CHRONIC COMBINED SYSTOLIC AND DIASTOLIC HEART FAILURE: Primary | ICD-10-CM

## 2021-10-26 DIAGNOSIS — I50.43 ACUTE ON CHRONIC COMBINED SYSTOLIC AND DIASTOLIC HEART FAILURE: ICD-10-CM

## 2021-10-26 LAB
ANION GAP SERPL CALC-SCNC: 13 MMOL/L (ref 8–16)
BUN SERPL-MCNC: 48 MG/DL (ref 8–23)
CALCIUM SERPL-MCNC: 9.1 MG/DL (ref 8.7–10.5)
CHLORIDE SERPL-SCNC: 104 MMOL/L (ref 95–110)
CO2 SERPL-SCNC: 17 MMOL/L (ref 23–29)
CREAT SERPL-MCNC: 2 MG/DL (ref 0.5–1.4)
EST. GFR  (AFRICAN AMERICAN): 26.2 ML/MIN/1.73 M^2
EST. GFR  (NON AFRICAN AMERICAN): 22.7 ML/MIN/1.73 M^2
GLUCOSE SERPL-MCNC: 169 MG/DL (ref 70–110)
POTASSIUM SERPL-SCNC: 4.4 MMOL/L (ref 3.5–5.1)
SODIUM SERPL-SCNC: 134 MMOL/L (ref 136–145)

## 2021-10-26 PROCEDURE — 1159F MED LIST DOCD IN RCRD: CPT | Mod: CPTII,S$GLB,, | Performed by: INTERNAL MEDICINE

## 2021-10-26 PROCEDURE — 1159F PR MEDICATION LIST DOCUMENTED IN MEDICAL RECORD: ICD-10-PCS | Mod: CPTII,S$GLB,, | Performed by: INTERNAL MEDICINE

## 2021-10-26 PROCEDURE — 99213 PR OFFICE/OUTPT VISIT, EST, LEVL III, 20-29 MIN: ICD-10-PCS | Mod: S$GLB,,, | Performed by: INTERNAL MEDICINE

## 2021-10-26 PROCEDURE — 3078F DIAST BP <80 MM HG: CPT | Mod: CPTII,S$GLB,, | Performed by: INTERNAL MEDICINE

## 2021-10-26 PROCEDURE — 99999 PR PBB SHADOW E&M-EST. PATIENT-LVL III: CPT | Mod: PBBFAC,,, | Performed by: INTERNAL MEDICINE

## 2021-10-26 PROCEDURE — 3075F SYST BP GE 130 - 139MM HG: CPT | Mod: CPTII,S$GLB,, | Performed by: INTERNAL MEDICINE

## 2021-10-26 PROCEDURE — 1126F AMNT PAIN NOTED NONE PRSNT: CPT | Mod: CPTII,S$GLB,, | Performed by: INTERNAL MEDICINE

## 2021-10-26 PROCEDURE — 1101F PT FALLS ASSESS-DOCD LE1/YR: CPT | Mod: CPTII,S$GLB,, | Performed by: INTERNAL MEDICINE

## 2021-10-26 PROCEDURE — 80048 BASIC METABOLIC PNL TOTAL CA: CPT | Performed by: INTERNAL MEDICINE

## 2021-10-26 PROCEDURE — 3288F FALL RISK ASSESSMENT DOCD: CPT | Mod: CPTII,S$GLB,, | Performed by: INTERNAL MEDICINE

## 2021-10-26 PROCEDURE — 99999 PR PBB SHADOW E&M-EST. PATIENT-LVL III: ICD-10-PCS | Mod: PBBFAC,,, | Performed by: INTERNAL MEDICINE

## 2021-10-26 PROCEDURE — 3078F PR MOST RECENT DIASTOLIC BLOOD PRESSURE < 80 MM HG: ICD-10-PCS | Mod: CPTII,S$GLB,, | Performed by: INTERNAL MEDICINE

## 2021-10-26 PROCEDURE — 1101F PR PT FALLS ASSESS DOC 0-1 FALLS W/OUT INJ PAST YR: ICD-10-PCS | Mod: CPTII,S$GLB,, | Performed by: INTERNAL MEDICINE

## 2021-10-26 PROCEDURE — 3075F PR MOST RECENT SYSTOLIC BLOOD PRESS GE 130-139MM HG: ICD-10-PCS | Mod: CPTII,S$GLB,, | Performed by: INTERNAL MEDICINE

## 2021-10-26 PROCEDURE — 99213 OFFICE O/P EST LOW 20 MIN: CPT | Mod: S$GLB,,, | Performed by: INTERNAL MEDICINE

## 2021-10-26 PROCEDURE — 36415 COLL VENOUS BLD VENIPUNCTURE: CPT | Performed by: INTERNAL MEDICINE

## 2021-10-26 PROCEDURE — 1126F PR PAIN SEVERITY QUANTIFIED, NO PAIN PRESENT: ICD-10-PCS | Mod: CPTII,S$GLB,, | Performed by: INTERNAL MEDICINE

## 2021-10-26 PROCEDURE — 3288F PR FALLS RISK ASSESSMENT DOCUMENTED: ICD-10-PCS | Mod: CPTII,S$GLB,, | Performed by: INTERNAL MEDICINE

## 2021-10-26 RX ORDER — FUROSEMIDE 20 MG/1
20 TABLET ORAL DAILY
Qty: 30 TABLET | Refills: 0 | Status: SHIPPED | OUTPATIENT
Start: 2021-10-26 | End: 2021-11-03

## 2021-10-26 RX ORDER — POTASSIUM CHLORIDE 750 MG/1
10 TABLET, EXTENDED RELEASE ORAL DAILY
Qty: 30 TABLET | Refills: 0 | Status: SHIPPED | OUTPATIENT
Start: 2021-10-26 | End: 2021-11-03

## 2021-11-02 ENCOUNTER — LAB VISIT (OUTPATIENT)
Dept: LAB | Facility: HOSPITAL | Age: 81
End: 2021-11-02
Attending: INTERNAL MEDICINE
Payer: MEDICARE

## 2021-11-02 ENCOUNTER — OFFICE VISIT (OUTPATIENT)
Dept: INTERNAL MEDICINE | Facility: CLINIC | Age: 81
End: 2021-11-02
Payer: MEDICARE

## 2021-11-02 VITALS
HEART RATE: 72 BPM | WEIGHT: 145.94 LBS | BODY MASS INDEX: 25.86 KG/M2 | OXYGEN SATURATION: 97 % | SYSTOLIC BLOOD PRESSURE: 112 MMHG | HEIGHT: 63 IN | DIASTOLIC BLOOD PRESSURE: 63 MMHG

## 2021-11-02 DIAGNOSIS — I25.5 CARDIOMYOPATHY, ISCHEMIC: ICD-10-CM

## 2021-11-02 DIAGNOSIS — I50.43 ACUTE ON CHRONIC COMBINED SYSTOLIC AND DIASTOLIC HEART FAILURE: ICD-10-CM

## 2021-11-02 DIAGNOSIS — R94.4 DECREASED GFR: ICD-10-CM

## 2021-11-02 DIAGNOSIS — I50.43 ACUTE ON CHRONIC COMBINED SYSTOLIC AND DIASTOLIC HEART FAILURE: Primary | ICD-10-CM

## 2021-11-02 LAB
ANION GAP SERPL CALC-SCNC: 10 MMOL/L (ref 8–16)
BUN SERPL-MCNC: 42 MG/DL (ref 8–23)
CALCIUM SERPL-MCNC: 9.6 MG/DL (ref 8.7–10.5)
CHLORIDE SERPL-SCNC: 106 MMOL/L (ref 95–110)
CO2 SERPL-SCNC: 20 MMOL/L (ref 23–29)
CREAT SERPL-MCNC: 2.2 MG/DL (ref 0.5–1.4)
EST. GFR  (AFRICAN AMERICAN): 23.4 ML/MIN/1.73 M^2
EST. GFR  (NON AFRICAN AMERICAN): 20.3 ML/MIN/1.73 M^2
GLUCOSE SERPL-MCNC: 62 MG/DL (ref 70–110)
POTASSIUM SERPL-SCNC: 5.2 MMOL/L (ref 3.5–5.1)
SODIUM SERPL-SCNC: 136 MMOL/L (ref 136–145)

## 2021-11-02 PROCEDURE — 36415 COLL VENOUS BLD VENIPUNCTURE: CPT | Performed by: INTERNAL MEDICINE

## 2021-11-02 PROCEDURE — 1159F PR MEDICATION LIST DOCUMENTED IN MEDICAL RECORD: ICD-10-PCS | Mod: CPTII,S$GLB,, | Performed by: INTERNAL MEDICINE

## 2021-11-02 PROCEDURE — 99213 PR OFFICE/OUTPT VISIT, EST, LEVL III, 20-29 MIN: ICD-10-PCS | Mod: S$GLB,,, | Performed by: INTERNAL MEDICINE

## 2021-11-02 PROCEDURE — 99999 PR PBB SHADOW E&M-EST. PATIENT-LVL V: ICD-10-PCS | Mod: PBBFAC,,, | Performed by: INTERNAL MEDICINE

## 2021-11-02 PROCEDURE — 1101F PR PT FALLS ASSESS DOC 0-1 FALLS W/OUT INJ PAST YR: ICD-10-PCS | Mod: CPTII,S$GLB,, | Performed by: INTERNAL MEDICINE

## 2021-11-02 PROCEDURE — 1126F AMNT PAIN NOTED NONE PRSNT: CPT | Mod: CPTII,S$GLB,, | Performed by: INTERNAL MEDICINE

## 2021-11-02 PROCEDURE — 99999 PR PBB SHADOW E&M-EST. PATIENT-LVL V: CPT | Mod: PBBFAC,,, | Performed by: INTERNAL MEDICINE

## 2021-11-02 PROCEDURE — 1101F PT FALLS ASSESS-DOCD LE1/YR: CPT | Mod: CPTII,S$GLB,, | Performed by: INTERNAL MEDICINE

## 2021-11-02 PROCEDURE — 3288F FALL RISK ASSESSMENT DOCD: CPT | Mod: CPTII,S$GLB,, | Performed by: INTERNAL MEDICINE

## 2021-11-02 PROCEDURE — 3074F PR MOST RECENT SYSTOLIC BLOOD PRESSURE < 130 MM HG: ICD-10-PCS | Mod: CPTII,S$GLB,, | Performed by: INTERNAL MEDICINE

## 2021-11-02 PROCEDURE — 1159F MED LIST DOCD IN RCRD: CPT | Mod: CPTII,S$GLB,, | Performed by: INTERNAL MEDICINE

## 2021-11-02 PROCEDURE — 99213 OFFICE O/P EST LOW 20 MIN: CPT | Mod: S$GLB,,, | Performed by: INTERNAL MEDICINE

## 2021-11-02 PROCEDURE — 80048 BASIC METABOLIC PNL TOTAL CA: CPT | Performed by: INTERNAL MEDICINE

## 2021-11-02 PROCEDURE — 1126F PR PAIN SEVERITY QUANTIFIED, NO PAIN PRESENT: ICD-10-PCS | Mod: CPTII,S$GLB,, | Performed by: INTERNAL MEDICINE

## 2021-11-02 PROCEDURE — 3078F DIAST BP <80 MM HG: CPT | Mod: CPTII,S$GLB,, | Performed by: INTERNAL MEDICINE

## 2021-11-02 PROCEDURE — 3074F SYST BP LT 130 MM HG: CPT | Mod: CPTII,S$GLB,, | Performed by: INTERNAL MEDICINE

## 2021-11-02 PROCEDURE — 3078F PR MOST RECENT DIASTOLIC BLOOD PRESSURE < 80 MM HG: ICD-10-PCS | Mod: CPTII,S$GLB,, | Performed by: INTERNAL MEDICINE

## 2021-11-02 PROCEDURE — 3288F PR FALLS RISK ASSESSMENT DOCUMENTED: ICD-10-PCS | Mod: CPTII,S$GLB,, | Performed by: INTERNAL MEDICINE

## 2021-11-03 ENCOUNTER — TELEPHONE (OUTPATIENT)
Dept: INTERNAL MEDICINE | Facility: CLINIC | Age: 81
End: 2021-11-03
Payer: MEDICARE

## 2021-11-03 DIAGNOSIS — E87.5 SERUM POTASSIUM ELEVATED: Primary | ICD-10-CM

## 2021-11-03 RX ORDER — FUROSEMIDE 20 MG/1
20 TABLET ORAL DAILY
Qty: 30 TABLET | Refills: 0
Start: 2021-11-03 | End: 2021-11-24

## 2021-11-03 RX ORDER — POTASSIUM CHLORIDE 750 MG/1
10 TABLET, EXTENDED RELEASE ORAL DAILY
Qty: 30 TABLET | Refills: 0
Start: 2021-11-03 | End: 2022-01-25

## 2021-11-08 ENCOUNTER — LAB VISIT (OUTPATIENT)
Dept: LAB | Facility: HOSPITAL | Age: 81
End: 2021-11-08
Attending: INTERNAL MEDICINE
Payer: MEDICARE

## 2021-11-08 ENCOUNTER — TELEPHONE (OUTPATIENT)
Dept: INTERNAL MEDICINE | Facility: CLINIC | Age: 81
End: 2021-11-08
Payer: MEDICARE

## 2021-11-08 DIAGNOSIS — E87.5 SERUM POTASSIUM ELEVATED: ICD-10-CM

## 2021-11-08 LAB
ANION GAP SERPL CALC-SCNC: 10 MMOL/L (ref 8–16)
BUN SERPL-MCNC: 40 MG/DL (ref 8–23)
CALCIUM SERPL-MCNC: 8.9 MG/DL (ref 8.7–10.5)
CHLORIDE SERPL-SCNC: 104 MMOL/L (ref 95–110)
CO2 SERPL-SCNC: 22 MMOL/L (ref 23–29)
CREAT SERPL-MCNC: 2 MG/DL (ref 0.5–1.4)
EST. GFR  (AFRICAN AMERICAN): 26.2 ML/MIN/1.73 M^2
EST. GFR  (NON AFRICAN AMERICAN): 22.7 ML/MIN/1.73 M^2
GLUCOSE SERPL-MCNC: 167 MG/DL (ref 70–110)
POTASSIUM SERPL-SCNC: 5.1 MMOL/L (ref 3.5–5.1)
SODIUM SERPL-SCNC: 136 MMOL/L (ref 136–145)

## 2021-11-08 PROCEDURE — 80048 BASIC METABOLIC PNL TOTAL CA: CPT | Performed by: INTERNAL MEDICINE

## 2021-11-08 PROCEDURE — 36415 COLL VENOUS BLD VENIPUNCTURE: CPT | Mod: PO | Performed by: INTERNAL MEDICINE

## 2021-11-09 ENCOUNTER — CLINICAL SUPPORT (OUTPATIENT)
Dept: CARDIOLOGY | Facility: HOSPITAL | Age: 81
End: 2021-11-09
Payer: MEDICARE

## 2021-11-09 DIAGNOSIS — Z95.810 PRESENCE OF AUTOMATIC (IMPLANTABLE) CARDIAC DEFIBRILLATOR: ICD-10-CM

## 2021-11-09 PROCEDURE — 93295 CARDIAC DEVICE CHECK - REMOTE: ICD-10-PCS | Mod: ,,, | Performed by: INTERNAL MEDICINE

## 2021-11-09 PROCEDURE — 93296 REM INTERROG EVL PM/IDS: CPT | Performed by: INTERNAL MEDICINE

## 2021-11-09 PROCEDURE — 93295 DEV INTERROG REMOTE 1/2/MLT: CPT | Mod: ,,, | Performed by: INTERNAL MEDICINE

## 2021-11-10 ENCOUNTER — OFFICE VISIT (OUTPATIENT)
Dept: CARDIOLOGY | Facility: CLINIC | Age: 81
End: 2021-11-10
Payer: MEDICARE

## 2021-11-10 VITALS
OXYGEN SATURATION: 97 % | SYSTOLIC BLOOD PRESSURE: 148 MMHG | BODY MASS INDEX: 25.7 KG/M2 | HEART RATE: 74 BPM | WEIGHT: 145.06 LBS | HEIGHT: 63 IN | DIASTOLIC BLOOD PRESSURE: 75 MMHG

## 2021-11-10 DIAGNOSIS — E78.5 HYPERLIPIDEMIA ASSOCIATED WITH TYPE 2 DIABETES MELLITUS: ICD-10-CM

## 2021-11-10 DIAGNOSIS — N18.4 CKD (CHRONIC KIDNEY DISEASE) STAGE 4, GFR 15-29 ML/MIN: ICD-10-CM

## 2021-11-10 DIAGNOSIS — I15.2 HYPERTENSION ASSOCIATED WITH DIABETES: ICD-10-CM

## 2021-11-10 DIAGNOSIS — E11.59 HYPERTENSION ASSOCIATED WITH DIABETES: ICD-10-CM

## 2021-11-10 DIAGNOSIS — Z95.810 BIVENTRICULAR ICD (IMPLANTABLE CARDIOVERTER-DEFIBRILLATOR) IN PLACE: ICD-10-CM

## 2021-11-10 DIAGNOSIS — I50.43 ACUTE ON CHRONIC COMBINED SYSTOLIC AND DIASTOLIC HEART FAILURE: ICD-10-CM

## 2021-11-10 DIAGNOSIS — I42.8 NONISCHEMIC CARDIOMYOPATHY: Primary | ICD-10-CM

## 2021-11-10 DIAGNOSIS — I44.7 LBBB (LEFT BUNDLE BRANCH BLOCK): ICD-10-CM

## 2021-11-10 DIAGNOSIS — E11.69 HYPERLIPIDEMIA ASSOCIATED WITH TYPE 2 DIABETES MELLITUS: ICD-10-CM

## 2021-11-10 PROCEDURE — 99214 OFFICE O/P EST MOD 30 MIN: CPT | Mod: S$GLB,,, | Performed by: INTERNAL MEDICINE

## 2021-11-10 PROCEDURE — 99499 UNLISTED E&M SERVICE: CPT | Mod: S$GLB,,, | Performed by: INTERNAL MEDICINE

## 2021-11-10 PROCEDURE — 99999 PR PBB SHADOW E&M-EST. PATIENT-LVL V: ICD-10-PCS | Mod: PBBFAC,,, | Performed by: INTERNAL MEDICINE

## 2021-11-10 PROCEDURE — 99214 PR OFFICE/OUTPT VISIT, EST, LEVL IV, 30-39 MIN: ICD-10-PCS | Mod: S$GLB,,, | Performed by: INTERNAL MEDICINE

## 2021-11-10 PROCEDURE — 99999 PR PBB SHADOW E&M-EST. PATIENT-LVL V: CPT | Mod: PBBFAC,,, | Performed by: INTERNAL MEDICINE

## 2021-11-10 PROCEDURE — 99499 RISK ADDL DX/OHS AUDIT: ICD-10-PCS | Mod: S$GLB,,, | Performed by: INTERNAL MEDICINE

## 2021-11-15 ENCOUNTER — TELEPHONE (OUTPATIENT)
Dept: OTOLARYNGOLOGY | Facility: CLINIC | Age: 81
End: 2021-11-15
Payer: MEDICARE

## 2021-11-16 ENCOUNTER — PATIENT OUTREACH (OUTPATIENT)
Dept: ADMINISTRATIVE | Facility: OTHER | Age: 81
End: 2021-11-16
Payer: MEDICARE

## 2021-11-17 ENCOUNTER — OFFICE VISIT (OUTPATIENT)
Dept: OTOLARYNGOLOGY | Facility: CLINIC | Age: 81
End: 2021-11-17
Payer: MEDICARE

## 2021-11-17 VITALS
BODY MASS INDEX: 25.37 KG/M2 | DIASTOLIC BLOOD PRESSURE: 69 MMHG | HEART RATE: 71 BPM | HEIGHT: 63 IN | SYSTOLIC BLOOD PRESSURE: 157 MMHG | WEIGHT: 143.19 LBS

## 2021-11-17 DIAGNOSIS — H90.A32 MIXED CONDUCTIVE AND SENSORINEURAL HEARING LOSS OF LEFT EAR WITH RESTRICTED HEARING OF RIGHT EAR: ICD-10-CM

## 2021-11-17 DIAGNOSIS — H90.A21 SENSORINEURAL HEARING LOSS (SNHL) OF RIGHT EAR WITH RESTRICTED HEARING OF LEFT EAR: ICD-10-CM

## 2021-11-17 DIAGNOSIS — H60.312 CHRONIC DIFFUSE OTITIS EXTERNA OF LEFT EAR: Primary | ICD-10-CM

## 2021-11-17 PROCEDURE — 99213 PR OFFICE/OUTPT VISIT, EST, LEVL III, 20-29 MIN: ICD-10-PCS | Mod: S$PBB,,, | Performed by: OTOLARYNGOLOGY

## 2021-11-17 PROCEDURE — 99999 PR PBB SHADOW E&M-EST. PATIENT-LVL V: CPT | Mod: PBBFAC,,, | Performed by: OTOLARYNGOLOGY

## 2021-11-17 PROCEDURE — 99999 PR PBB SHADOW E&M-EST. PATIENT-LVL V: ICD-10-PCS | Mod: PBBFAC,,, | Performed by: OTOLARYNGOLOGY

## 2021-11-17 PROCEDURE — 99213 OFFICE O/P EST LOW 20 MIN: CPT | Mod: S$PBB,,, | Performed by: OTOLARYNGOLOGY

## 2021-11-18 ENCOUNTER — TELEPHONE (OUTPATIENT)
Dept: OTOLARYNGOLOGY | Facility: CLINIC | Age: 81
End: 2021-11-18
Payer: MEDICARE

## 2021-11-23 ENCOUNTER — IMMUNIZATION (OUTPATIENT)
Dept: INTERNAL MEDICINE | Facility: CLINIC | Age: 81
End: 2021-11-23
Payer: MEDICARE

## 2021-11-23 ENCOUNTER — OFFICE VISIT (OUTPATIENT)
Dept: INTERNAL MEDICINE | Facility: CLINIC | Age: 81
End: 2021-11-23
Payer: MEDICARE

## 2021-11-23 VITALS
SYSTOLIC BLOOD PRESSURE: 138 MMHG | WEIGHT: 143.31 LBS | BODY MASS INDEX: 25.38 KG/M2 | HEART RATE: 68 BPM | DIASTOLIC BLOOD PRESSURE: 52 MMHG

## 2021-11-23 DIAGNOSIS — N18.4 CKD (CHRONIC KIDNEY DISEASE) STAGE 4, GFR 15-29 ML/MIN: ICD-10-CM

## 2021-11-23 DIAGNOSIS — E11.3213 CONTROLLED TYPE 2 DIABETES MELLITUS WITH BOTH EYES AFFECTED BY MILD NONPROLIFERATIVE RETINOPATHY AND MACULAR EDEMA, WITH LONG-TERM CURRENT USE OF INSULIN: ICD-10-CM

## 2021-11-23 DIAGNOSIS — I15.2 HYPERTENSION ASSOCIATED WITH DIABETES: ICD-10-CM

## 2021-11-23 DIAGNOSIS — J42 CHRONIC BRONCHITIS, UNSPECIFIED CHRONIC BRONCHITIS TYPE: Primary | ICD-10-CM

## 2021-11-23 DIAGNOSIS — J45.20 MILD INTERMITTENT CHRONIC ASTHMA WITHOUT COMPLICATION: ICD-10-CM

## 2021-11-23 DIAGNOSIS — Z79.4 CONTROLLED TYPE 2 DIABETES MELLITUS WITH BOTH EYES AFFECTED BY MILD NONPROLIFERATIVE RETINOPATHY AND MACULAR EDEMA, WITH LONG-TERM CURRENT USE OF INSULIN: ICD-10-CM

## 2021-11-23 DIAGNOSIS — E11.59 HYPERTENSION ASSOCIATED WITH DIABETES: ICD-10-CM

## 2021-11-23 PROCEDURE — 99214 OFFICE O/P EST MOD 30 MIN: CPT | Mod: S$PBB,25,, | Performed by: INTERNAL MEDICINE

## 2021-11-23 PROCEDURE — 99999 PR PBB SHADOW E&M-EST. PATIENT-LVL III: CPT | Mod: PBBFAC,,, | Performed by: INTERNAL MEDICINE

## 2021-11-23 PROCEDURE — G0008 FLU VACCINE - QUADRIVALENT - ADJUVANTED: ICD-10-PCS | Mod: ,,, | Performed by: INTERNAL MEDICINE

## 2021-11-23 PROCEDURE — 90694 FLU VACCINE - QUADRIVALENT - ADJUVANTED: ICD-10-PCS | Mod: ,,, | Performed by: INTERNAL MEDICINE

## 2021-11-23 PROCEDURE — 99214 PR OFFICE/OUTPT VISIT, EST, LEVL IV, 30-39 MIN: ICD-10-PCS | Mod: S$PBB,25,, | Performed by: INTERNAL MEDICINE

## 2021-11-23 PROCEDURE — G0008 ADMIN INFLUENZA VIRUS VAC: HCPCS | Mod: ,,, | Performed by: INTERNAL MEDICINE

## 2021-11-23 PROCEDURE — 90694 VACC AIIV4 NO PRSRV 0.5ML IM: CPT | Mod: ,,, | Performed by: INTERNAL MEDICINE

## 2021-11-23 PROCEDURE — 99999 PR PBB SHADOW E&M-EST. PATIENT-LVL III: ICD-10-PCS | Mod: PBBFAC,,, | Performed by: INTERNAL MEDICINE

## 2021-11-23 RX ORDER — DOXYCYCLINE HYCLATE 100 MG
100 TABLET ORAL EVERY 12 HOURS
Qty: 14 TABLET | Refills: 0 | Status: SHIPPED | OUTPATIENT
Start: 2021-11-23 | End: 2022-01-25

## 2021-11-23 RX ORDER — PREDNISONE 10 MG/1
10 TABLET ORAL DAILY
Qty: 7 TABLET | Refills: 1 | Status: SHIPPED | OUTPATIENT
Start: 2021-11-23 | End: 2022-01-25

## 2021-11-30 ENCOUNTER — CLINICAL SUPPORT (OUTPATIENT)
Dept: OTOLARYNGOLOGY | Facility: CLINIC | Age: 81
End: 2021-11-30
Payer: MEDICARE

## 2021-11-30 DIAGNOSIS — Z46.1 ENCOUNTER FOR FITTING AND ADJUSTMENT OF HEARING AID OF BOTH EARS: Primary | ICD-10-CM

## 2021-11-30 PROCEDURE — 99499 NO LOS: ICD-10-PCS | Mod: S$GLB,,, | Performed by: AUDIOLOGIST-HEARING AID FITTER

## 2021-11-30 PROCEDURE — 99499 UNLISTED E&M SERVICE: CPT | Mod: S$GLB,,, | Performed by: AUDIOLOGIST-HEARING AID FITTER

## 2021-12-03 ENCOUNTER — TELEPHONE (OUTPATIENT)
Dept: OTOLARYNGOLOGY | Facility: CLINIC | Age: 81
End: 2021-12-03
Payer: MEDICARE

## 2021-12-07 ENCOUNTER — LAB VISIT (OUTPATIENT)
Dept: LAB | Facility: HOSPITAL | Age: 81
End: 2021-12-07
Attending: INTERNAL MEDICINE
Payer: MEDICARE

## 2021-12-07 DIAGNOSIS — N18.4 CHRONIC KIDNEY DISEASE, STAGE IV (SEVERE): ICD-10-CM

## 2021-12-07 LAB
CREAT UR-MCNC: 52 MG/DL (ref 15–325)
PROT UR-MCNC: 277 MG/DL (ref 0–15)
PROT/CREAT UR: 5.33 MG/G{CREAT} (ref 0–0.2)

## 2021-12-07 PROCEDURE — 84156 ASSAY OF PROTEIN URINE: CPT | Performed by: INTERNAL MEDICINE

## 2021-12-14 ENCOUNTER — OFFICE VISIT (OUTPATIENT)
Dept: NEPHROLOGY | Facility: CLINIC | Age: 81
End: 2021-12-14
Payer: MEDICARE

## 2021-12-14 VITALS
SYSTOLIC BLOOD PRESSURE: 132 MMHG | HEART RATE: 80 BPM | HEIGHT: 63 IN | DIASTOLIC BLOOD PRESSURE: 52 MMHG | WEIGHT: 142.88 LBS | OXYGEN SATURATION: 91 % | BODY MASS INDEX: 25.32 KG/M2

## 2021-12-14 DIAGNOSIS — N18.4 CHRONIC KIDNEY DISEASE, STAGE IV (SEVERE): Primary | ICD-10-CM

## 2021-12-14 PROCEDURE — 99999 PR PBB SHADOW E&M-EST. PATIENT-LVL V: ICD-10-PCS | Mod: PBBFAC,,, | Performed by: INTERNAL MEDICINE

## 2021-12-14 PROCEDURE — 99214 PR OFFICE/OUTPT VISIT, EST, LEVL IV, 30-39 MIN: ICD-10-PCS | Mod: S$GLB,,, | Performed by: INTERNAL MEDICINE

## 2021-12-14 PROCEDURE — 99214 OFFICE O/P EST MOD 30 MIN: CPT | Mod: S$GLB,,, | Performed by: INTERNAL MEDICINE

## 2021-12-14 PROCEDURE — 99999 PR PBB SHADOW E&M-EST. PATIENT-LVL V: CPT | Mod: PBBFAC,,, | Performed by: INTERNAL MEDICINE

## 2021-12-20 ENCOUNTER — TELEPHONE (OUTPATIENT)
Dept: OTOLARYNGOLOGY | Facility: CLINIC | Age: 81
End: 2021-12-20
Payer: MEDICARE

## 2021-12-21 DIAGNOSIS — I44.2 COMPLETE HEART BLOCK: ICD-10-CM

## 2021-12-21 DIAGNOSIS — I50.43 ACUTE ON CHRONIC COMBINED SYSTOLIC AND DIASTOLIC HEART FAILURE: ICD-10-CM

## 2021-12-21 DIAGNOSIS — Z95.810 BIVENTRICULAR ICD (IMPLANTABLE CARDIOVERTER-DEFIBRILLATOR) IN PLACE: ICD-10-CM

## 2021-12-21 DIAGNOSIS — I42.8 NONISCHEMIC CARDIOMYOPATHY: Primary | ICD-10-CM

## 2021-12-21 DIAGNOSIS — I44.7 LBBB (LEFT BUNDLE BRANCH BLOCK): ICD-10-CM

## 2021-12-23 ENCOUNTER — PATIENT OUTREACH (OUTPATIENT)
Dept: ADMINISTRATIVE | Facility: OTHER | Age: 81
End: 2021-12-23
Payer: MEDICARE

## 2022-01-25 ENCOUNTER — OFFICE VISIT (OUTPATIENT)
Dept: INTERNAL MEDICINE | Facility: CLINIC | Age: 82
End: 2022-01-25
Payer: MEDICARE

## 2022-01-25 ENCOUNTER — LAB VISIT (OUTPATIENT)
Dept: LAB | Facility: HOSPITAL | Age: 82
End: 2022-01-25
Payer: MEDICARE

## 2022-01-25 ENCOUNTER — TELEPHONE (OUTPATIENT)
Dept: INTERNAL MEDICINE | Facility: CLINIC | Age: 82
End: 2022-01-25

## 2022-01-25 VITALS
RESPIRATION RATE: 18 BRPM | HEIGHT: 63 IN | TEMPERATURE: 98 F | WEIGHT: 146.19 LBS | HEART RATE: 89 BPM | DIASTOLIC BLOOD PRESSURE: 58 MMHG | BODY MASS INDEX: 25.9 KG/M2 | SYSTOLIC BLOOD PRESSURE: 142 MMHG | OXYGEN SATURATION: 96 %

## 2022-01-25 DIAGNOSIS — Z79.4 CONTROLLED TYPE 2 DIABETES MELLITUS WITH BOTH EYES AFFECTED BY MILD NONPROLIFERATIVE RETINOPATHY AND MACULAR EDEMA, WITH LONG-TERM CURRENT USE OF INSULIN: ICD-10-CM

## 2022-01-25 DIAGNOSIS — E11.3213 CONTROLLED TYPE 2 DIABETES MELLITUS WITH BOTH EYES AFFECTED BY MILD NONPROLIFERATIVE RETINOPATHY AND MACULAR EDEMA, WITH LONG-TERM CURRENT USE OF INSULIN: ICD-10-CM

## 2022-01-25 DIAGNOSIS — G62.0 CHEMOTHERAPY-INDUCED NEUROPATHY: ICD-10-CM

## 2022-01-25 DIAGNOSIS — E11.69 HYPERLIPIDEMIA ASSOCIATED WITH TYPE 2 DIABETES MELLITUS: ICD-10-CM

## 2022-01-25 DIAGNOSIS — F32.0 CURRENT MILD EPISODE OF MAJOR DEPRESSIVE DISORDER WITHOUT PRIOR EPISODE: ICD-10-CM

## 2022-01-25 DIAGNOSIS — J45.40 MODERATE PERSISTENT ASTHMA, UNSPECIFIED WHETHER COMPLICATED: ICD-10-CM

## 2022-01-25 DIAGNOSIS — J42 CHRONIC BRONCHITIS, UNSPECIFIED CHRONIC BRONCHITIS TYPE: ICD-10-CM

## 2022-01-25 DIAGNOSIS — J45.20 MILD INTERMITTENT CHRONIC ASTHMA WITHOUT COMPLICATION: ICD-10-CM

## 2022-01-25 DIAGNOSIS — E27.8 ADRENAL CORTICAL NODULE: ICD-10-CM

## 2022-01-25 DIAGNOSIS — I42.8 NONISCHEMIC CARDIOMYOPATHY: ICD-10-CM

## 2022-01-25 DIAGNOSIS — R06.02 SOB (SHORTNESS OF BREATH): ICD-10-CM

## 2022-01-25 DIAGNOSIS — I50.42 CHRONIC COMBINED SYSTOLIC (CONGESTIVE) AND DIASTOLIC (CONGESTIVE) HEART FAILURE: ICD-10-CM

## 2022-01-25 DIAGNOSIS — Z95.810 BIVENTRICULAR ICD (IMPLANTABLE CARDIOVERTER-DEFIBRILLATOR) IN PLACE: ICD-10-CM

## 2022-01-25 DIAGNOSIS — I44.7 LBBB (LEFT BUNDLE BRANCH BLOCK): ICD-10-CM

## 2022-01-25 DIAGNOSIS — Z76.89 ENCOUNTER TO ESTABLISH CARE WITH NEW DOCTOR: Primary | ICD-10-CM

## 2022-01-25 DIAGNOSIS — N18.4 CKD (CHRONIC KIDNEY DISEASE) STAGE 4, GFR 15-29 ML/MIN: ICD-10-CM

## 2022-01-25 DIAGNOSIS — E78.5 HYPERLIPIDEMIA ASSOCIATED WITH TYPE 2 DIABETES MELLITUS: ICD-10-CM

## 2022-01-25 DIAGNOSIS — M81.0 OSTEOPOROSIS, UNSPECIFIED OSTEOPOROSIS TYPE, UNSPECIFIED PATHOLOGICAL FRACTURE PRESENCE: ICD-10-CM

## 2022-01-25 DIAGNOSIS — D32.9 MENINGIOMA: ICD-10-CM

## 2022-01-25 DIAGNOSIS — D50.9 IRON DEFICIENCY ANEMIA, UNSPECIFIED IRON DEFICIENCY ANEMIA TYPE: ICD-10-CM

## 2022-01-25 DIAGNOSIS — Z85.828 HISTORY OF NONMELANOMA SKIN CANCER: ICD-10-CM

## 2022-01-25 DIAGNOSIS — G47.33 OBSTRUCTIVE SLEEP APNEA: ICD-10-CM

## 2022-01-25 DIAGNOSIS — I70.0 CALCIFICATION OF AORTA: ICD-10-CM

## 2022-01-25 DIAGNOSIS — T45.1X5A CHEMOTHERAPY-INDUCED NEUROPATHY: ICD-10-CM

## 2022-01-25 LAB
25(OH)D3+25(OH)D2 SERPL-MCNC: 31 NG/ML (ref 30–96)
ESTIMATED AVG GLUCOSE: 120 MG/DL (ref 68–131)
HBA1C MFR BLD: 5.8 % (ref 4–5.6)

## 2022-01-25 PROCEDURE — 99215 PR OFFICE/OUTPT VISIT, EST, LEVL V, 40-54 MIN: ICD-10-PCS | Mod: S$GLB,,, | Performed by: STUDENT IN AN ORGANIZED HEALTH CARE EDUCATION/TRAINING PROGRAM

## 2022-01-25 PROCEDURE — 1126F AMNT PAIN NOTED NONE PRSNT: CPT | Mod: CPTII,S$GLB,, | Performed by: STUDENT IN AN ORGANIZED HEALTH CARE EDUCATION/TRAINING PROGRAM

## 2022-01-25 PROCEDURE — 3288F PR FALLS RISK ASSESSMENT DOCUMENTED: ICD-10-PCS | Mod: CPTII,S$GLB,, | Performed by: STUDENT IN AN ORGANIZED HEALTH CARE EDUCATION/TRAINING PROGRAM

## 2022-01-25 PROCEDURE — 99215 OFFICE O/P EST HI 40 MIN: CPT | Mod: S$GLB,,, | Performed by: STUDENT IN AN ORGANIZED HEALTH CARE EDUCATION/TRAINING PROGRAM

## 2022-01-25 PROCEDURE — 1101F PT FALLS ASSESS-DOCD LE1/YR: CPT | Mod: CPTII,S$GLB,, | Performed by: STUDENT IN AN ORGANIZED HEALTH CARE EDUCATION/TRAINING PROGRAM

## 2022-01-25 PROCEDURE — 99499 RISK ADDL DX/OHS AUDIT: ICD-10-PCS | Mod: S$GLB,,, | Performed by: STUDENT IN AN ORGANIZED HEALTH CARE EDUCATION/TRAINING PROGRAM

## 2022-01-25 PROCEDURE — 3078F PR MOST RECENT DIASTOLIC BLOOD PRESSURE < 80 MM HG: ICD-10-PCS | Mod: CPTII,S$GLB,, | Performed by: STUDENT IN AN ORGANIZED HEALTH CARE EDUCATION/TRAINING PROGRAM

## 2022-01-25 PROCEDURE — 1159F MED LIST DOCD IN RCRD: CPT | Mod: CPTII,S$GLB,, | Performed by: STUDENT IN AN ORGANIZED HEALTH CARE EDUCATION/TRAINING PROGRAM

## 2022-01-25 PROCEDURE — 1126F PR PAIN SEVERITY QUANTIFIED, NO PAIN PRESENT: ICD-10-PCS | Mod: CPTII,S$GLB,, | Performed by: STUDENT IN AN ORGANIZED HEALTH CARE EDUCATION/TRAINING PROGRAM

## 2022-01-25 PROCEDURE — 1101F PR PT FALLS ASSESS DOC 0-1 FALLS W/OUT INJ PAST YR: ICD-10-PCS | Mod: CPTII,S$GLB,, | Performed by: STUDENT IN AN ORGANIZED HEALTH CARE EDUCATION/TRAINING PROGRAM

## 2022-01-25 PROCEDURE — 3077F PR MOST RECENT SYSTOLIC BLOOD PRESSURE >= 140 MM HG: ICD-10-PCS | Mod: CPTII,S$GLB,, | Performed by: STUDENT IN AN ORGANIZED HEALTH CARE EDUCATION/TRAINING PROGRAM

## 2022-01-25 PROCEDURE — 83036 HEMOGLOBIN GLYCOSYLATED A1C: CPT | Performed by: NURSE PRACTITIONER

## 2022-01-25 PROCEDURE — 1160F PR REVIEW ALL MEDS BY PRESCRIBER/CLIN PHARMACIST DOCUMENTED: ICD-10-PCS | Mod: CPTII,S$GLB,, | Performed by: STUDENT IN AN ORGANIZED HEALTH CARE EDUCATION/TRAINING PROGRAM

## 2022-01-25 PROCEDURE — 3077F SYST BP >= 140 MM HG: CPT | Mod: CPTII,S$GLB,, | Performed by: STUDENT IN AN ORGANIZED HEALTH CARE EDUCATION/TRAINING PROGRAM

## 2022-01-25 PROCEDURE — 99499 UNLISTED E&M SERVICE: CPT | Mod: S$GLB,,, | Performed by: STUDENT IN AN ORGANIZED HEALTH CARE EDUCATION/TRAINING PROGRAM

## 2022-01-25 PROCEDURE — 82306 VITAMIN D 25 HYDROXY: CPT | Performed by: NURSE PRACTITIONER

## 2022-01-25 PROCEDURE — 99999 PR PBB SHADOW E&M-EST. PATIENT-LVL V: CPT | Mod: PBBFAC,,, | Performed by: STUDENT IN AN ORGANIZED HEALTH CARE EDUCATION/TRAINING PROGRAM

## 2022-01-25 PROCEDURE — 3078F DIAST BP <80 MM HG: CPT | Mod: CPTII,S$GLB,, | Performed by: STUDENT IN AN ORGANIZED HEALTH CARE EDUCATION/TRAINING PROGRAM

## 2022-01-25 PROCEDURE — 1159F PR MEDICATION LIST DOCUMENTED IN MEDICAL RECORD: ICD-10-PCS | Mod: CPTII,S$GLB,, | Performed by: STUDENT IN AN ORGANIZED HEALTH CARE EDUCATION/TRAINING PROGRAM

## 2022-01-25 PROCEDURE — 1160F RVW MEDS BY RX/DR IN RCRD: CPT | Mod: CPTII,S$GLB,, | Performed by: STUDENT IN AN ORGANIZED HEALTH CARE EDUCATION/TRAINING PROGRAM

## 2022-01-25 PROCEDURE — 99999 PR PBB SHADOW E&M-EST. PATIENT-LVL V: ICD-10-PCS | Mod: PBBFAC,,, | Performed by: STUDENT IN AN ORGANIZED HEALTH CARE EDUCATION/TRAINING PROGRAM

## 2022-01-25 PROCEDURE — 3288F FALL RISK ASSESSMENT DOCD: CPT | Mod: CPTII,S$GLB,, | Performed by: STUDENT IN AN ORGANIZED HEALTH CARE EDUCATION/TRAINING PROGRAM

## 2022-01-25 PROCEDURE — 36415 COLL VENOUS BLD VENIPUNCTURE: CPT | Performed by: NURSE PRACTITIONER

## 2022-01-25 PROCEDURE — 82985 ASSAY OF GLYCATED PROTEIN: CPT | Performed by: NURSE PRACTITIONER

## 2022-01-25 RX ORDER — TIOTROPIUM BROMIDE 18 UG/1
18 CAPSULE ORAL; RESPIRATORY (INHALATION) DAILY
Qty: 90 CAPSULE | Refills: 3 | Status: SHIPPED | OUTPATIENT
Start: 2022-01-25 | End: 2022-07-08

## 2022-01-25 RX ORDER — DAPAGLIFLOZIN 5 MG/1
5 TABLET, FILM COATED ORAL DAILY
Qty: 90 TABLET | Refills: 3 | Status: SHIPPED | OUTPATIENT
Start: 2022-01-25 | End: 2022-02-11

## 2022-01-25 RX ORDER — MONTELUKAST SODIUM 10 MG/1
10 TABLET ORAL NIGHTLY
Qty: 90 TABLET | Refills: 3 | Status: SHIPPED | OUTPATIENT
Start: 2022-01-25 | End: 2023-01-10 | Stop reason: SDUPTHER

## 2022-01-25 RX ORDER — DAPAGLIFLOZIN 5 MG/1
10 TABLET, FILM COATED ORAL DAILY
Qty: 180 TABLET | Refills: 3 | Status: SHIPPED | OUTPATIENT
Start: 2022-01-25 | End: 2022-01-25

## 2022-01-25 NOTE — TELEPHONE ENCOUNTER
----- Message from Bubba Quezada sent at 1/25/2022 11:10 AM CST -----  Hi pt would like to speak with nurse need to know why  didn't refer her to Pulmonary     Please Call    Contact  [phone]

## 2022-01-25 NOTE — PATIENT INSTRUCTIONS
---stop tradjenta, start farxiga (for diabetes, CKD, and heart failure)  --add spiriva to your morning inhalers    Appt to make:   --pulmonology

## 2022-01-25 NOTE — TELEPHONE ENCOUNTER
No new care gaps identified.  Powered by [employer]. Reference number: [id number].   1/25/2022 12:06:48 PM CST

## 2022-01-25 NOTE — PROGRESS NOTES
INTERNAL MEDICINE INITIAL VISIT NOTE      CHIEF COMPLAINT     Chief Complaint   Patient presents with    Establish Care    Shortness of Breath       ASSESSMENT/PLAN     Myesha Quinones is a 81 y.o. female who presents with TIIDM, mixed hearing loss, asthma, LBBB s/p ICD placement, non-ischemic cardiomyopathy, HLD, combined heart failure, hx breast cancer, iron deficiency anemia, osteoporosis, KHOA here today to establish care.    1. Encounter to establish care with new doctor  2. SOB (shortness of breath)  3. Mild intermittent chronic asthma without complication  - tiotropium (SPIRIVA) 18 mcg inhalation capsule; Inhale 1 capsule (18 mcg total) into the lungs once daily. Controller  Dispense: 90 capsule; Refill: 3  - montelukast (SINGULAIR) 10 mg tablet; Take 1 tablet (10 mg total) by mouth every evening.  Dispense: 90 tablet; Refill: 3  4. 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 Chronic combined systolic (congestive) and diastolic (congestive) heart failure  - CBC Auto Differential; Future  - Comprehensive Metabolic Panel; Future  - Lipid Panel; Future  6. Moderate persistent asthma, unspecified whether complicated  7. CKD (chronic kidney disease) stage 4, GFR 15-29 ml/min  8. Chemotherapy-induced neuropathy  9. Nonischemic cardiomyopathy  10. LBBB (left bundle branch block)  11. Biventricular ICD (implantable cardioverter-defibrillator) in place  12. Hyperlipidemia associated with type 2 diabetes mellitus  13. History of nonmelanoma skin cancer  14. Iron deficiency anemia, unspecified iron deficiency anemia type  15. Osteoporosis, unspecified osteoporosis type, unspecified pathological fracture presence  16. Obstructive sleep apnea  17. Current mild episode of major depressive disorder without prior episode  18. Chronic  bronchitis, unspecified chronic bronchitis type  19. Meningioma  20. Adrenal cortical nodule  21. Calcification of aorta      All previous labs, imaging, and notes reviewed up to the time point of this note.   All past medical issues reviewed and appropriate items addressed. Not on optimal diabetes care, will stop DDP-4 inhibitor and start sglt2-inhibitor, which unfortunately is only covered at 5 mg/day by her insurance. Breathing has been an issue and weight is stable, likely pulmonary issue. Will start LAMA and advised to follow up with pulm. Follows with cards, endocrine, and nephrology. Very close with her daughter who lives 4 miles away. BP not at goal today, reviewed medications in detail.         HM reviewed and discussed in detail with the patient.     RTC in 3 months, sooner if needed and depending on labs.    I spent 53 minutes in the patient encounter, more than 50% of time was spent on counseling and coordination of care.         HPI     Myesha Quinones is a 81 y.o. female who presents with TIIDM, mixed hearing loss, asthma, LBBB s/p ICD placement, non-ischemic cardiomyopathy, HLD, combined heart failure, hx breast cancer, iron deficiency anemia, osteoporosis, KHOA here today to establish care.    Patient was previously seen by Dr. Gaston. Last visit 11/23/21.       Non-ischemic cardiomyopathy with combined CHF and LBBB s/p ICD placement. Sees cardiology every 3 months alternating with internal medicine. Last ECHO 3/23/21 with EF 50% (improved from prior). Pacemaker is ventricularly pacing 100% of the time. Baseline weight is around 143 lbs. She reports ~25 lbs weight loss in the past year.Taking: coreg 50 mg BID, clonidine 0.1 mg patch, cardizem 240 mg daily, hydralazine 100 mg BID, and irbesartan 300 mg QHS.  I query use of diltiazem in someone with HFrEF. She is taking lasix currently as needed only given her CKD.     Grief - lost her  Dec 2020 due to COVID. She had a mild case herself.      CKD IV - follows with nephrology. Dr. Mccoy. Last visit 7/2021. Has had CKD for the past decade, baseline Cr. 2.1.  Due for follow up every 6 months.     Asthma - obstructive asthma. Was last seen by pulmonology Meaghan Zhu on 11/18/2020. Currently taking advair, ventolin rescue inhaler and nebulizer and singulair. She is taking nebulizers 2-3 times per day.  We discussed starting on a LAMA and I prescribed tiotropium to take QAM. Needs to f/u with pulm.     TIIDM - last A1c 6.6 last check. On Tradjenta and lantus 28 units QAM. Due to risk of worsening CHF with DPP-4, will stop and switch to SGLT2-inhibitor. Sees Elidia Madison in endocrinology, but mostly monitoring her osteoporosis and adrenal nodule.     Osteoporosis - not wanting to take medications due to concern over SE. Taking calcium and vitamin D, and unable to walk very far due to respiratory issues.     Iron deficiency - previously getting iron infusions, but reports those didn't help. Not on iron supplement.     Hx of breast cancer - ER/NV + breast cancer hx, underwent 4 cycles of chemo with adriamycin and cytoxan, then 4 cycles of taxotere, then tamoxifen for 5 years, then femara for 5 years, completed in 2009. We will stop annual mammogram. Is done seeing hematology, last visit 4/2019. Declines therapy if she were to get repeat cancer.     Past Medical History:  Past Medical History:   Diagnosis Date    Acute hypoxemic respiratory failure 12/19/2019    Acute right-sided thoracic back pain 12/9/2019    Allergy     Asthma     Basal cell carcinoma     left forehead    Basal cell carcinoma     left nose    Basal cell carcinoma 05/20/2015    right nose    Basal cell carcinoma 12/22/2015    left lower post neck    Basal cell carcinoma 12/03/2019    left ant scalp     Breast cancer     CAD (coronary artery disease)     Cardiomyopathy     Cardiomyopathy, ischemic     Cataract     CHF (congestive heart failure)     Chronic kidney disease,  stage 3, mod decreased GFR 2/14/2017    Colon polyp 2011    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, with long-term current use of insulin 2/22/2018    COPD (chronic obstructive pulmonary disease)     COPD exacerbation 4/8/2018    Defibrillator discharge     Diabetes mellitus     Diabetes mellitus type II     Diabetes with neurologic complications     Goiter     MNG    HX: breast cancer     Hyperlipidemia     Hypertension     Iron deficiency anemia 5/16/2017    Left kidney mass     Meningioma     Osteoporosis, postmenopausal     Pneumonia 12/8/2019    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    Pseudomonas pneumonia     Skin cancer     s/p excision    Sleep apnea     CPAP    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/2015    mid forehead    Unspecified vitamin D deficiency     Ventricular tachycardia     Vitamin B12 deficiency     Vitamin D deficiency disease        Past Surgical History:  Past Surgical History:   Procedure Laterality Date    BASAL CELL CARCINOMA EXCISION      posterior neck and nose    BREAST BIOPSY      BREAST CYST EXCISION Left     BREAST SURGERY      CARDIAC DEFIBRILLATOR PLACEMENT      x 2    CATARACT EXTRACTION W/  INTRAOCULAR LENS IMPLANT Bilateral     CHOLECYSTECTOMY      COLONOSCOPY N/A 11/5/2019    Procedure: COLONOSCOPY;  Surgeon: Boaz Botello MD;  Location: [facility] END[facility] ([address] [place] [address]);  Service: Endoscopy;  Laterality: N/A;  AICD - Med[employer]onic - sm    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    FRACTURE SURGERY      left elbow and wrist as a child    HYSTERECTOMY      MASTECTOMY Right     REPLACEMENT OF IMPLANTABLE CARDIOVERTER-DEFIBRILLATOR (ICD) GENERATOR N/A 12/17/2018    Procedure: REPLACEMENT, ICD GENERATOR;  Surgeon: Jan Mckeon MD;  Location: [facility];  Service: Cardiology;  Laterality: N/A;  DONNA, CRT-D gen change, MDT, MAC, SK, 3 Prep    REVISION OF SKIN POCKET FOR  CARDIOVERTER-DEFIBRILLATOR  12/17/2018    Procedure: Revision, Skin Pocket, For Cardioverter-Defibrillator;  Surgeon: Jan Mckeon MD;  Location: [facility];  Service: Cardiology;;    SQUAMOUS CELL CARCINOMA EXCISION      remved from forehead    TONSILLECTOMY         Home Medications:  Prior to Admission medications    Medication Sig Start Date End Date Taking? Authorizing Provider   acetaminophen (TYLENOL) 500 MG tablet Take 1,000 mg by mouth daily as needed for Pain.   Yes Historical Provider   albuterol (PROVENTIL) 2.5 mg /3 mL (0.083 %) nebulizer solution  7/23/21  Yes Historical Provider   albuterol (PROVENTIL/VENTOLIN HFA) 90 mcg/actuation inhaler INHALE 2 PUFFS INTO THE LUNGS EVERY 6 (SIX) HOURS AS NEEDED FOR WHEEZING. RESCUE 10/18/21  Yes Emelina Gaston MD   albuterol-ipratropium (DUO-NEB) 2.5 mg-0.5 mg/3 mL nebulizer solution TAKE 3 MLS BY NEBULIZATION EVERY 4 (FOUR) HOURS AS NEEDED FOR WHEEZING. 12/9/21  Yes Emelina Gaston MD   ascorbic acid, vitamin C, (VITAMIN C) 100 MG tablet Take 100 mg by mouth once daily.   Yes Historical Provider   blood sugar diagnostic (ACCU-CHEK ANGELICA) Strp Uses Accu-Check Angelica meter to test BG 4x/day 12/15/20  Yes Emelina Gaston MD   carvediloL (COREG) 25 MG tablet TAKE 2 TABLETS (50 MG TOTAL) BY MOUTH 2 (TWO) TIMES DAILY. 4/18/21  Yes Jose Luis Fernando MD   cholecalciferol, vitamin D3, (VITAMIN D3) 50 mcg (2,000 unit) Tab Take 1 tablet by mouth once daily.    Yes Historical Provider   cloNIDine 0.1 mg/24 hr td ptwk (CATAPRES) 0.1 mg/24 hr Place 1 patch onto the skin every Tuesday. 4/13/21  Yes Saran Bain MD   CYANOCOBALAMIN, VITAMIN B-12, (B-12 DOTS ORAL) Take 1 tablet by mouth once daily.   Yes Historical Provider   diltiaZEM (CARDIZEM CD) 240 MG 24 hr capsule Take 1 capsule (240 mg total) by mouth once daily. 3/25/21 3/25/22 Yes Misha Lawson MD   fluticasone propionate (FLONASE) 50 mcg/actuation nasal spray 2 sprays (100 mcg total) by Each Nostril route once  "daily. 2/24/21  Yes Brittany Metcalf MD   fluticasone-salmeterol diskus inhaler 250-50 mcg Inhale 1 puff into the lungs 2 (two) times daily. Controller 5/13/21 5/13/22 Yes Meaghan Zhu DNP   furosemide (LASIX) 20 MG tablet Take one tablet every other day, for edema take one daily for 3 days only  Patient taking differently: Take one tablet every other day, for edema take one daily for 3 days only. PATIENT TAKING AS NEEDED 11/24/21  Yes Emelina Gaston MD   hydrALAZINE (APRESOLINE) 100 MG tablet TAKE 1 TABLET BY MOUTH THREE TIMES A DAY 9/22/21  Yes Theodora Swanson NP   irbesartan (AVAPRO) 300 MG tablet Take 1 tablet (300 mg total) by mouth every evening. 6/21/21 6/21/22 Yes Saran Bain MD   lancets (ACCU-CHEK SOFTCLIX LANCETS) Misc Uses Accu-Chek Angelica meter to test BG 1x/day 12/22/20  Yes Emelina Gaston MD   magnesium oxide (MAG-OX) 400 mg tablet Take 1 tablet (400 mg total) by mouth once daily. 12/8/17  Yes Emelina Gaston MD   montelukast (SINGULAIR) 10 mg tablet Take 10 mg by mouth every evening. 1/31/21  Yes Historical Provider   nitroGLYCERIN (NITROSTAT) 0.4 MG SL tablet Place 0.4 mg under the tongue every 5 (five) minutes as needed for Chest pain.  5/29/12  Yes Historical Provider   omega-3 fatty acids 500 mg Cap Take 1 capsule by mouth Twice daily. 5/29/12  Yes Historical Provider   pen needle, diabetic (BD ULTRA-FINE MINI PEN NEEDLE) 31 gauge x 3/16" Ndle USE WITH LANTUS AT BEDTIME 12/14/20  Yes Emelina Gaston MD   pravastatin (PRAVACHOL) 40 MG tablet Take 1 tablet (40 mg total) by mouth once daily. 6/2/21  Yes Saran Bain MD   pulse oximeter (PULSE OXIMETER) device by Apply Externally route 2 (two) times a day. Use twice daily at 8 AM and 3 PM and record the value in MyChart as directed. 12/12/20  Yes Rodrigo Schmidt MD   TRADJENTA 5 mg Tab tablet TAKE 1 TABLET BY MOUTH EVERY DAY 8/6/21  Yes Emelina Gaston MD   VENOFER 100 mg iron/5 mL injection  4/12/21  Yes Historical " Provider   doxycycline (VIBRA-TABS) 100 MG tablet Take 1 tablet (100 mg total) by mouth every 12 (twelve) hours. Take with food and water 11/23/21   Emelina Gaston MD   insulin (LANTUS SOLOSTAR U-100 INSULIN) glargine 100 units/mL (3mL) SubQ pen Inject 28 Units into the skin once daily. Increase per providers instruction. Max TDD 40units. 10/1/21 10/31/21  Elidia Madison NP   levocetirizine (XYZAL) 5 MG tablet Take 1 tablet (5 mg total) by mouth every evening. 7/20/20 9/17/21  Brittany Metcalf MD   potassium chloride (MICRO-K) 10 MEQ CpSR TAKE 1 CAPSULE BY MOUTH EVERY DAY 11/26/21   Emelina Gaston MD   potassium chloride SA (K-DUR,KLOR-CON) 10 MEQ tablet Take 1 tablet (10 mEq total) by mouth once daily. Stopped on 11/3 11/3/21   Emelina Gaston MD   predniSONE (DELTASONE) 10 MG tablet Take 1 tablet (10 mg total) by mouth once daily. 11/23/21   Emelina Gaston MD   sodium bicarbonate 650 MG tablet TAKE 1 TABLET (650 MG TOTAL) BY MOUTH 2 (TWO) TIMES DAILY. 8/24/21 9/23/21  Keri Juarez MD       Allergies:  Review of patient's allergies indicates:   Allergen Reactions    Iodine and iodide containing products Hives    Nifedipine      weakness       Family History:  Family History   Problem Relation Age of Onset    Diabetes Father     Heart disease Father     Diabetes Sister     Heart disease Sister     Diabetes Brother     Heart disease Brother     Hypertension Brother     Diabetes Brother     Heart disease Brother     Hypertension Brother     Diabetes Brother     Heart disease Brother     Cancer Brother         colon    Diabetes Brother     Cancer Son         skin    Diabetes Son         prediabetes    Diabetes Daughter         prediabetes    Cancer Daughter         melanoma    Obesity Daughter     Melanoma Daughter     Diabetes Son     Asthma Mother     Hypertension Mother     Stroke Mother     No Known Problems Maternal Grandmother     No Known Problems Maternal Grandfather   "   No Known Problems Paternal Grandmother     No Known Problems Paternal Grandfather     Amblyopia Neg Hx     Blindness Neg Hx     Cataracts Neg Hx     Glaucoma Neg Hx     Macular degeneration Neg Hx     Retinal detachment Neg Hx     Strabismus Neg Hx     Thyroid disease Neg Hx        Social History:  Social History     Tobacco Use    Smoking status: Never Smoker    Smokeless tobacco: Never Used   Substance Use Topics    Alcohol use: No     Alcohol/week: 0.0 standard drinks    Drug use: No       Review of Systems:  Review of Systems   Constitutional: Negative for fever and unexpected weight change.   HENT: Negative for sinus pressure and sore throat.    Eyes: Negative for visual disturbance.   Respiratory: Negative for cough and shortness of breath.    Cardiovascular: Positive for leg swelling. Negative for chest pain and palpitations.   Gastrointestinal: Negative for constipation, diarrhea, nausea and vomiting.   Endocrine: Negative for polyuria.   Genitourinary: Negative for dysuria and urgency.   Musculoskeletal: Negative for arthralgias and myalgias.   Skin: Negative for rash.   Allergic/Immunologic: Negative for environmental allergies.   Neurological: Negative for dizziness, light-headedness and headaches.   Hematological: Does not bruise/bleed easily.   Psychiatric/Behavioral: Negative for agitation and decreased concentration. The patient is not nervous/anxious.        Health Maintenance:   Due for dexa - declines  Due for shingles, foot exam, urine micro      PHYSICAL EXAM     BP (!) 142/58 (BP Location: Right arm, Patient Position: Sitting, BP Method: Medium (Manual))   Pulse 89   Temp 98 °F (36.7 °C) (Oral)   Resp 18   Ht 5' 3" (1.6 m)   Wt 66.3 kg (146 lb 2.6 oz)   LMP  (LMP Unknown)   SpO2 96%   BMI 25.89 kg/m²     GEN - A+OX4, NAD very well-appearing older woman, appears stated age  HEENT - PERRL, EOMI, OP clear  Neck - No thyromegaly or thyroid masses felt.   CV - RRR, no m/r/g "   Chest - CTAB, no wheezing, crackles, or rhonchi   Abd - S/NT/ND/+BS.   Ext - 1+ pitting edema to mid-calf bilaterally, with stockings in place  LN - No LAD appreciated.  Skin - Normal color and texture, no rash, no skin lesions.      LABS     Lab Results   Component Value Date    WBC 12.30 12/07/2021    HGB 10.0 (L) 12/07/2021    HCT 32.2 (L) 12/07/2021    MCV 94 12/07/2021     12/07/2021     Sodium   Date Value Ref Range Status   12/07/2021 134 (L) 136 - 145 mmol/L Final     Potassium   Date Value Ref Range Status   12/07/2021 5.2 (H) 3.5 - 5.1 mmol/L Final     Chloride   Date Value Ref Range Status   12/07/2021 102 95 - 110 mmol/L Final     CO2   Date Value Ref Range Status   12/07/2021 21 (L) 23 - 29 mmol/L Final     Glucose   Date Value Ref Range Status   12/07/2021 137 (H) 70 - 110 mg/dL Final     BUN   Date Value Ref Range Status   12/07/2021 42 (H) 8 - 23 mg/dL Final     Creatinine   Date Value Ref Range Status   12/07/2021 2.3 (H) 0.5 - 1.4 mg/dL Final     Calcium   Date Value Ref Range Status   12/07/2021 9.2 8.7 - 10.5 mg/dL Final     Total Protein   Date Value Ref Range Status   09/17/2021 6.8 6.0 - 8.4 g/dL Final     Albumin   Date Value Ref Range Status   09/17/2021 3.2 (L) 3.5 - 5.2 g/dL Final     Total Bilirubin   Date Value Ref Range Status   09/17/2021 0.3 0.1 - 1.0 mg/dL Final     Comment:     For infants and newborns, interpretation of results should be based  on gestational age, weight and in agreement with clinical  observations.    Premature Infant recommended reference ranges:  Up to 24 hours.............<8.0 mg/dL  Up to 48 hours............<12.0 mg/dL  3-5 days..................<15.0 mg/dL  6-29 days.................<15.0 mg/dL       Alkaline Phosphatase   Date Value Ref Range Status   09/17/2021 123 55 - 135 U/L Final     AST   Date Value Ref Range Status   09/17/2021 17 10 - 40 U/L Final     ALT   Date Value Ref Range Status   09/17/2021 15 10 - 44 U/L Final     Anion Gap   Date  Value Ref Range Status   12/07/2021 11 8 - 16 mmol/L Final     eGFR if    Date Value Ref Range Status   12/07/2021 22.1 (A) >60 mL/min/1.73 m^2 Final     eGFR if non    Date Value Ref Range Status   12/07/2021 19.2 (A) >60 mL/min/1.73 m^2 Final     Comment:     Calculation used to obtain the estimated glomerular filtration  rate (eGFR) is the CKD-EPI equation.        Lab Results   Component Value Date    HGBA1C 6.6 (H) 08/24/2021     Lab Results   Component Value Date    LDLCALC 79.8 02/08/2021     Lab Results   Component Value Date    TSH 0.592 06/02/2021           Catrachita Rothman MD   [facility]   Department of Internal Medicine   01/25/2022

## 2022-01-26 PROBLEM — E11.29 MICROALBUMINURIA DUE TO TYPE 2 DIABETES MELLITUS: Status: ACTIVE | Noted: 2022-01-26

## 2022-01-26 PROBLEM — R80.9 MICROALBUMINURIA DUE TO TYPE 2 DIABETES MELLITUS: Status: ACTIVE | Noted: 2022-01-26

## 2022-01-27 ENCOUNTER — PATIENT MESSAGE (OUTPATIENT)
Dept: INTERNAL MEDICINE | Facility: CLINIC | Age: 82
End: 2022-01-27
Payer: MEDICARE

## 2022-01-28 LAB — FRUCTOSAMINE SERPL-SCNC: 302 UMOL /L

## 2022-01-31 ENCOUNTER — PATIENT OUTREACH (OUTPATIENT)
Dept: ADMINISTRATIVE | Facility: OTHER | Age: 82
End: 2022-01-31
Payer: MEDICARE

## 2022-01-31 NOTE — PROGRESS NOTES
Health Maintenance Due   Topic Date Due    Shingles Vaccine (2 of 3) 05/28/2009    DEXA SCAN  04/30/2020    Foot Exam  11/06/2020     Updates were requested from care everywhere.  Chart was reviewed for overdue Proactive [facility] Encounters ([facility]) topics (CRS, Breast Cancer Screening, Eye exam)  Health Maintenance has been updated.  LINKS immunization registry triggered.  Immunizations were reconciled.

## 2022-01-31 NOTE — PROGRESS NOTES
Subjective:      Patient ID: Myesha Quinones is a 81 y.o. female.    Chief Complaint:  No chief complaint on file.    History of Present Illness  Myesha Quinones is here for follow up of T2DM, MNG, osteoporosis and adrenal nodule.  Previously seen by me on 10/1/2021.    Patient arrives with her son.    With regards to diabetes:    Diagnosed: 1993  Known complications:  DKA -  RN +  PN +  Nephropathy + follows with nephrology  CAD +    Current regimen:  Tradjenta 5mg daily - stopped about 1 week ago as recommended by her PCP     Lantus 28units daily - skipped it yesterday due to hypoglycemia     She was prescribed Farxiga 5mg daily but has not started it.     Other medications tried:  Metformin - stopped in April due to CKD     Glucose Monitor:   2 times a day testing  Log reviewed: log provided and reviewed, scanned in media tab    Hypoglycemia:  Yes - early AM   Knows how to correct with 15 grams of carbs- juice, coke, or a peppermint.     Diet/Exercise:  Eats 3 meals a day.   B: oatmeal, 1/2 banana / cereal/ egg  L: [employer] prepared meals - meat, vegetable, rice or pasta (small portion)  D: prepared meal / meat, vegetable  Snacks : occasionally - sweets.   Drinks : water, coffee, NO soft drinks.  Exercise: None.      Education - last visit: in the past  Eye Exam: 10/2021  Podiatry: 11/2019    Diabetes Management Status    Hemoglobin A1C   Date Value Ref Range Status   01/25/2022 5.8 (H) 4.0 - 5.6 % Final     Comment:     ADA Screening Guidelines:  5.7-6.4%  Consistent with prediabetes  >or=6.5%  Consistent with diabetes    High levels of fetal hemoglobin interfere with the HbA1C  assay. Heterozygous hemoglobin variants (HbS, HgC, etc)do  not significantly interfere with this assay.   However, presence of multiple variants may affect accuracy.     08/24/2021 6.6 (H) 4.0 - 5.6 % Final     Comment:     ADA Screening Guidelines:  5.7-6.4%  Consistent with prediabetes  >or=6.5%  Consistent with  diabetes    High levels of fetal hemoglobin interfere with the HbA1C  assay. Heterozygous hemoglobin variants (HbS, HgC, etc)do  not significantly interfere with this assay.   However, presence of multiple variants may affect accuracy.     06/02/2021 6.4 (H) 4.0 - 5.6 % Final     Comment:     ADA Screening Guidelines:  5.7-6.4%  Consistent with prediabetes  >or=6.5%  Consistent with diabetes    High levels of fetal hemoglobin interfere with the HbA1C  assay. Heterozygous hemoglobin variants (HbS, HgC, etc)do  not significantly interfere with this assay.   However, presence of multiple variants may affect accuracy.         Statin: Taking  ACE/ARB: Taking  Screening or Prevention Patient's value Goal Complete/Controlled?   HgA1C Testing and Control   Lab Results   Component Value Date    HGBA1C 5.8 (H) 01/25/2022      Annually/Less than 8% Yes   Lipid profile : 02/08/2021 Annually Yes   LDL control Lab Results   Component Value Date    LDLCALC 79.8 02/08/2021    Annually/Less than 100 mg/dl  Yes   Nephropathy screening Lab Results   Component Value Date    LABMICR 2145.0 01/25/2022     Lab Results   Component Value Date    PROTEINUA 3+ (A) 03/23/2021    Annually Yes   Blood pressure BP Readings from Last 1 Encounters:   02/01/22 130/70    Less than 140/90 No   Dilated retinal exam : 10/08/2021 Annually Yes   Foot exam   : 11/06/2019 Annually No     With regards to thyroid nodule:    Thyroid US:   11/19/2020  1.  Thyroid gland is normal in size with heterogenous echotexture.  2.  Two nodules in the right thyroid described above.  None meet criteria for fine-needle aspiration.  3.  Left superior lobe nodule is unchanged.  It does not meet criteria for fine-needle aspiration.  RECOMMENDATIONS:  Follow-up ultrasound in 1-2 years is recommended    FNA:   Left nodule: Benign - 2016  Right nodule: Benign - 2014    Signs or Symptoms:   Difficulty breathing: Denies  Difficulty swallowing: Denies  Voice Changes: Hoarse  FH of  "thyroid cancer: Denies  Personal history of radiation treatment or exposure: Denies     Lab Results   Component Value Date    TSH 0.592 06/02/2021    FREET4 1.22 09/16/2016       Denies signs or symptoms of hyper or hypothyroidism.    With regards to osteoporosis:     BMD:   4/30/2018  Osteopenia of the femoral neck and lumbar spine -- FRAX score supports treatment  RECOMMENDATIONS of [facility]:  1.  Calcium 4901-9066 mg daily and vitamin D 800-1000 units daily, adequate exercise.  2.  Recommended therapy Consider bisphosphonates (alendronate, risedronate, ibandronate, zolendronic acid) or denosumab.  3.  Repeat BMD in 2 years    Medications:   Alendronate - did not tolerate due to bone pain.  She was also offered prolia, but she read about the side-effects and is not interested in starting it or any other therapy for osteoporosis.  Calcium intake: None  Vit D intake: OTC Vit D3 2000iu daily     Weight bearing exercise: None  Falls: Denies  Fractures: Denies    With regards to adrenal nodule:     Was found to have bilateral adrenal nodules found incidentally on CT scan "many" years ago. She reports she was initially having yearly CT scans to follow them, but it was recommended to stop doing them due to lack of growth. There are two CT scans in our system (2016 and 2017).     CT Abd Pel WO Con  12/7/2017  A right adrenal nodule is stable in size when compared to 3/10/16, measuring 2.5 cm.  A left adrenal nodule is slightly larger on today's examination measuring 2.0 cm, previously 1.7 cm.    12/7/2019  There is a left adrenal nodule measuring 2.6 x 2.1 cm, as well as a right adrenal nodule measuring 2.7 x 1.7 cm, which are stable in size and appearance when compared to CT 12/07/2017.    Reports a functional workup was done in the past. Unable to find record of this in Epic or [facility] - evaluation was done prior?    Review of Systems   Constitutional: Negative for fatigue. " "  Eyes: Negative for visual disturbance.   Respiratory: Positive for shortness of breath (on exertion).    Cardiovascular: Negative for chest pain.   Gastrointestinal: Negative for abdominal pain.   Musculoskeletal: Negative for arthralgias.   Skin: Negative for wound.   Neurological: Negative for headaches.       Objective:   Physical Exam  Vitals reviewed.   Neck:      Thyroid: No thyromegaly (irregular shaped gland).   Cardiovascular:      Rate and Rhythm: Normal rate.      Heart sounds: Heart sounds are distant.   Pulmonary:      Effort: Pulmonary effort is normal.   Abdominal:      Palpations: Abdomen is soft.   Musculoskeletal:      Right lower le+ Pitting Edema present.      Left lower le+ Pitting Edema present.         Visit Vitals  /70   Ht 5' 3" (1.6 m)   Wt 65.8 kg (144 lb 15.2 oz)   LMP  (LMP Unknown)   BMI 25.68 kg/m²     Injection sites are without edema or erythema. No lipo hypertropthy or atrophy.    Body mass index is 25.68 kg/m².    Lab Review:   Lab Results   Component Value Date    HGBA1C 5.8 (H) 2022    HGBA1C 6.6 (H) 2021    HGBA1C 6.4 (H) 2021       Lab Results   Component Value Date    CHOL 156 2021    HDL 47 2021    LDLCALC 79.8 2021    TRIG 146 2021    CHOLHDL 30.1 2021     Lab Results   Component Value Date     (L) 2021    K 5.2 (H) 2021     2021    CO2 21 (L) 2021     (H) 2021    BUN 42 (H) 2021    CREATININE 2.3 (H) 2021    CALCIUM 9.2 2021    PROT 6.8 2021    ALBUMIN 3.2 (L) 2021    BILITOT 0.3 2021    ALKPHOS 123 2021    AST 17 2021    ALT 15 2021    ANIONGAP 11 2021    ESTGFRAFRICA 22.1 (A) 2021    EGFRNONAA 19.2 (A) 2021    TSH 0.592 2021     Vit D, 25-Hydroxy   Date Value Ref Range Status   2022 31 30 - 96 ng/mL Final     Comment:     Vitamin D deficiency.........<10 ng/mL                    "           Vitamin D insufficiency......10-29 ng/mL       Vitamin D sufficiency........> or equal to 30 ng/mL  Vitamin D toxicity............>100 ng/mL       Assessment and Plan     1. 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 Nontoxic multinodular goiter     3. Osteoporosis, unspecified osteoporosis type, unspecified pathological fracture presence     4. Adrenal cortical nodule: repeat CT 6/2016     5. Uncontrolled type 2 diabetes mellitus with hyperglycemia  insulin (LANTUS SOLOSTAR U-100 INSULIN) glargine 100 units/mL (3mL) SubQ pen   6. 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  -- A1c goal < or =7.5%.  -- Medications discussed:  MFM - stopped due to CKD  GLP1-DPP4   SARAH   SGLT2   Reviewed potential adverse effects of SGLT-2 inhibitors, including genital mycotic infections, slightly increased risk of UTI, hypersensitivity, hypotension, and hyperkalemia. Advised to maintain water intake of 8-10 cups per day. Advised we need to check chemistry panel at baseline and 2 weeks after starting. Discussed FDA warning reports of ketoacidosis associated with SGLT-2 inhibitors. Advised to seek immediate medical attention and stop the medication if symptoms such as difficulty breathing, nausea, vomiting, abdominal pain, confusion, and unusual fatigue/sleepiness. Discussed possible precipitating factors including major illness/reduced food and fluid intake (advised to stop under these circumstances), and reduced insulin dose. Discussed possible effects of increased fracture risk/decreased bone density. Discussed reports  of increased risk of leg and foot amputations with canagliflozin and need to seek urgent care if developed new pain or tenderness, sores or ulcers, or infections in legs/feet.   Insulin   -- Reviewed logs/CGM:  Glucose well controlled. Noting recent hypoglycemia.  Reach out to me sooner for any glucose <70 or consistently >200.  -- Recently saw PCP with recommendation to stop DPP4 and start SGLT2i - from an endocrine standpoint SGLT2i is not recommended for GFR <30. Noting cardiac and kidney protective benefit will loop in nephro/cardiology per patient request.   -- Medication Changes:   CHANGE  Lantus 22units DAILY  -- Reviewed goals of therapy are to get the best control we can without hypoglycemia.  -- Reviewed patient's current insulin regimen. Clarified proper insulin dose and timing in relation to meals, etc. Insulin injection sites and proper rotation instructed.    -- Advised frequent self blood glucose monitoring.  Patient encouraged to document glucose results and bring them to every clinic visit.  -- Hypoglycemia precautions discussed. Instructed on precautions before driving.    -- Call for Bg repeatedly < 90 or > 180.   -- Close adherence to lifestyle changes recommended.   -- Periodic follow ups for eye evaluations, foot care and dental care suggested.    Nontoxic multinodular goiter  -- Thyroid US due 11/2022.  -- Denies compressive symptoms.  -- FNA:   Left nodule: Benign - 2016  Right nodule: Benign - 2014    Osteoporosis  -- Refused BMD due to it not changing her approach to management.  -- Patient does not wish to pursue antiresorptive treatments citing previous side-effects and wanting to avoid new medications due to fear of side-effects (Prolia specifically).  -- Taking vitamin D 2000 IU daily and she gets calcium in her diet.  -- Suggested walking for weight bearing exercise.    Adrenal cortical nodule: repeat CT 6/2016  -- Stable on imaging for over a decade suggests benign adenomas. She  thinks she had a biochemical workup in the past, but the results aren't available in our system as they predate 2004. Blood pressure is reasonably well-controlled. No hypokalemia. No symptoms to suggest pheo, and no overt Cushing syndrome. Subclinical Cushing is a possibility, but the benefit vs. risk of adrenalectomy would very likely favor conservative management. Therefore, will hold off on further testing at this point since it would be unlikely to alter management.      Follow up in about 4 months (around 6/1/2022).          Patient reports to feel overwhelmed with all the different recommendations from different providers. I am reaching out to her care team.

## 2022-02-01 ENCOUNTER — OFFICE VISIT (OUTPATIENT)
Dept: ENDOCRINOLOGY | Facility: CLINIC | Age: 82
End: 2022-02-01
Payer: MEDICARE

## 2022-02-01 VITALS
DIASTOLIC BLOOD PRESSURE: 70 MMHG | HEIGHT: 63 IN | SYSTOLIC BLOOD PRESSURE: 130 MMHG | BODY MASS INDEX: 25.68 KG/M2 | WEIGHT: 144.94 LBS

## 2022-02-01 DIAGNOSIS — Z79.4 CONTROLLED TYPE 2 DIABETES MELLITUS WITH BOTH EYES AFFECTED BY MILD NONPROLIFERATIVE RETINOPATHY AND MACULAR EDEMA, WITH LONG-TERM CURRENT USE OF INSULIN: Primary | ICD-10-CM

## 2022-02-01 DIAGNOSIS — E04.2 NONTOXIC MULTINODULAR GOITER: ICD-10-CM

## 2022-02-01 DIAGNOSIS — E11.42 DIABETIC POLYNEUROPATHY ASSOCIATED WITH TYPE 2 DIABETES MELLITUS: ICD-10-CM

## 2022-02-01 DIAGNOSIS — E11.3213 CONTROLLED TYPE 2 DIABETES MELLITUS WITH BOTH EYES AFFECTED BY MILD NONPROLIFERATIVE RETINOPATHY AND MACULAR EDEMA, WITH LONG-TERM CURRENT USE OF INSULIN: Primary | ICD-10-CM

## 2022-02-01 DIAGNOSIS — E11.65 UNCONTROLLED TYPE 2 DIABETES MELLITUS WITH HYPERGLYCEMIA: ICD-10-CM

## 2022-02-01 DIAGNOSIS — M81.0 OSTEOPOROSIS, UNSPECIFIED OSTEOPOROSIS TYPE, UNSPECIFIED PATHOLOGICAL FRACTURE PRESENCE: ICD-10-CM

## 2022-02-01 DIAGNOSIS — E27.8 ADRENAL CORTICAL NODULE: ICD-10-CM

## 2022-02-01 PROCEDURE — 99999 PR PBB SHADOW E&M-EST. PATIENT-LVL IV: CPT | Mod: PBBFAC,,, | Performed by: NURSE PRACTITIONER

## 2022-02-01 PROCEDURE — 3288F PR FALLS RISK ASSESSMENT DOCUMENTED: ICD-10-PCS | Mod: CPTII,S$GLB,, | Performed by: NURSE PRACTITIONER

## 2022-02-01 PROCEDURE — 99214 OFFICE O/P EST MOD 30 MIN: CPT | Mod: S$GLB,,, | Performed by: NURSE PRACTITIONER

## 2022-02-01 PROCEDURE — 1101F PT FALLS ASSESS-DOCD LE1/YR: CPT | Mod: CPTII,S$GLB,, | Performed by: NURSE PRACTITIONER

## 2022-02-01 PROCEDURE — 1101F PR PT FALLS ASSESS DOC 0-1 FALLS W/OUT INJ PAST YR: ICD-10-PCS | Mod: CPTII,S$GLB,, | Performed by: NURSE PRACTITIONER

## 2022-02-01 PROCEDURE — 3075F PR MOST RECENT SYSTOLIC BLOOD PRESS GE 130-139MM HG: ICD-10-PCS | Mod: CPTII,S$GLB,, | Performed by: NURSE PRACTITIONER

## 2022-02-01 PROCEDURE — 99999 PR PBB SHADOW E&M-EST. PATIENT-LVL IV: ICD-10-PCS | Mod: PBBFAC,,, | Performed by: NURSE PRACTITIONER

## 2022-02-01 PROCEDURE — 99214 PR OFFICE/OUTPT VISIT, EST, LEVL IV, 30-39 MIN: ICD-10-PCS | Mod: S$GLB,,, | Performed by: NURSE PRACTITIONER

## 2022-02-01 PROCEDURE — 1159F PR MEDICATION LIST DOCUMENTED IN MEDICAL RECORD: ICD-10-PCS | Mod: CPTII,S$GLB,, | Performed by: NURSE PRACTITIONER

## 2022-02-01 PROCEDURE — 99499 RISK ADDL DX/OHS AUDIT: ICD-10-PCS | Mod: S$GLB,,, | Performed by: NURSE PRACTITIONER

## 2022-02-01 PROCEDURE — 1126F AMNT PAIN NOTED NONE PRSNT: CPT | Mod: CPTII,S$GLB,, | Performed by: NURSE PRACTITIONER

## 2022-02-01 PROCEDURE — 1160F RVW MEDS BY RX/DR IN RCRD: CPT | Mod: CPTII,S$GLB,, | Performed by: NURSE PRACTITIONER

## 2022-02-01 PROCEDURE — 3078F PR MOST RECENT DIASTOLIC BLOOD PRESSURE < 80 MM HG: ICD-10-PCS | Mod: CPTII,S$GLB,, | Performed by: NURSE PRACTITIONER

## 2022-02-01 PROCEDURE — 3075F SYST BP GE 130 - 139MM HG: CPT | Mod: CPTII,S$GLB,, | Performed by: NURSE PRACTITIONER

## 2022-02-01 PROCEDURE — 1126F PR PAIN SEVERITY QUANTIFIED, NO PAIN PRESENT: ICD-10-PCS | Mod: CPTII,S$GLB,, | Performed by: NURSE PRACTITIONER

## 2022-02-01 PROCEDURE — 3288F FALL RISK ASSESSMENT DOCD: CPT | Mod: CPTII,S$GLB,, | Performed by: NURSE PRACTITIONER

## 2022-02-01 PROCEDURE — 1159F MED LIST DOCD IN RCRD: CPT | Mod: CPTII,S$GLB,, | Performed by: NURSE PRACTITIONER

## 2022-02-01 PROCEDURE — 99499 UNLISTED E&M SERVICE: CPT | Mod: S$GLB,,, | Performed by: NURSE PRACTITIONER

## 2022-02-01 PROCEDURE — 3078F DIAST BP <80 MM HG: CPT | Mod: CPTII,S$GLB,, | Performed by: NURSE PRACTITIONER

## 2022-02-01 PROCEDURE — 1160F PR REVIEW ALL MEDS BY PRESCRIBER/CLIN PHARMACIST DOCUMENTED: ICD-10-PCS | Mod: CPTII,S$GLB,, | Performed by: NURSE PRACTITIONER

## 2022-02-01 RX ORDER — LANCETS
EACH MISCELLANEOUS
Qty: 400 EACH | Refills: 3 | Status: SHIPPED | OUTPATIENT
Start: 2022-02-01 | End: 2022-06-06 | Stop reason: SDUPTHER

## 2022-02-01 RX ORDER — INSULIN GLARGINE 100 [IU]/ML
22 INJECTION, SOLUTION SUBCUTANEOUS DAILY
Qty: 5 EACH | Refills: 6 | Status: SHIPPED | OUTPATIENT
Start: 2022-02-01 | End: 2022-03-03

## 2022-02-01 NOTE — ASSESSMENT & PLAN NOTE
-- Refused BMD due to it not changing her approach to management.  -- Patient does not wish to pursue antiresorptive treatments citing previous side-effects and wanting to avoid new medications due to fear of side-effects (Prolia specifically).  -- Taking vitamin D 2000 IU daily and she gets calcium in her diet.  -- Suggested walking for weight bearing exercise.

## 2022-02-01 NOTE — ASSESSMENT & PLAN NOTE
-- Labs prior to follow up.  -- A1c goal < or =7.5%.  -- Medications discussed:  MFM - stopped due to CKD  GLP1-DPP4   SARAH   SGLT2   Reviewed potential adverse effects of SGLT-2 inhibitors, including genital mycotic infections, slightly increased risk of UTI, hypersensitivity, hypotension, and hyperkalemia. Advised to maintain water intake of 8-10 cups per day. Advised we need to check chemistry panel at baseline and 2 weeks after starting. Discussed FDA warning reports of ketoacidosis associated with SGLT-2 inhibitors. Advised to seek immediate medical attention and stop the medication if symptoms such as difficulty breathing, nausea, vomiting, abdominal pain, confusion, and unusual fatigue/sleepiness. Discussed possible precipitating factors including major illness/reduced food and fluid intake (advised to stop under these circumstances), and reduced insulin dose. Discussed possible effects of increased fracture risk/decreased bone density. Discussed reports of increased risk of leg and foot amputations with canagliflozin and need to seek urgent care if developed new pain or tenderness, sores or ulcers, or infections in legs/feet.   Insulin   -- Reviewed logs/CGM:  Glucose well controlled. Noting recent hypoglycemia.  Reach out to me sooner for any glucose <70 or consistently >200.  -- Recently saw PCP with recommendation to stop DPP4 and start SGLT2i - from an endocrine standpoint SGLT2i is not recommended for GFR <30. Noting cardiac and kidney protective benefit will loop in nephro/cardiology per patient request.   -- Medication Changes:   CHANGE  Lantus 22units DAILY  -- Reviewed goals of therapy are to get the best control we can without hypoglycemia.  -- Reviewed patient's current insulin regimen. Clarified proper insulin dose and timing in relation to meals, etc. Insulin injection sites and proper rotation instructed.    -- Advised frequent self blood glucose monitoring.  Patient encouraged to document  glucose results and bring them to every clinic visit.  -- Hypoglycemia precautions discussed. Instructed on precautions before driving.    -- Call for Bg repeatedly < 90 or > 180.   -- Close adherence to lifestyle changes recommended.   -- Periodic follow ups for eye evaluations, foot care and dental care suggested.

## 2022-02-01 NOTE — ASSESSMENT & PLAN NOTE
-- Stable on imaging for over a decade suggests benign adenomas. She thinks she had a biochemical workup in the past, but the results aren't available in our system as they predate 2004. Blood pressure is reasonably well-controlled. No hypokalemia. No symptoms to suggest pheo, and no overt Cushing syndrome. Subclinical Cushing is a possibility, but the benefit vs. risk of adrenalectomy would very likely favor conservative management. Therefore, will hold off on further testing at this point since it would be unlikely to alter management.

## 2022-02-01 NOTE — ASSESSMENT & PLAN NOTE
-- Thyroid US due 11/2022.  -- Denies compressive symptoms.  -- FNA:   Left nodule: Benign - 2016  Right nodule: Benign - 2014

## 2022-02-04 ENCOUNTER — TELEPHONE (OUTPATIENT)
Dept: CARDIOLOGY | Facility: CLINIC | Age: 82
End: 2022-02-04
Payer: MEDICARE

## 2022-02-04 DIAGNOSIS — I50.42 CHRONIC COMBINED SYSTOLIC (CONGESTIVE) AND DIASTOLIC (CONGESTIVE) HEART FAILURE: Primary | ICD-10-CM

## 2022-02-07 ENCOUNTER — CLINICAL SUPPORT (OUTPATIENT)
Dept: CARDIOLOGY | Facility: HOSPITAL | Age: 82
End: 2022-02-07
Payer: MEDICARE

## 2022-02-07 ENCOUNTER — TELEPHONE (OUTPATIENT)
Dept: ENDOCRINOLOGY | Facility: CLINIC | Age: 82
End: 2022-02-07
Payer: MEDICARE

## 2022-02-07 DIAGNOSIS — I44.2 ATRIOVENTRICULAR BLOCK, COMPLETE: ICD-10-CM

## 2022-02-07 DIAGNOSIS — Z95.810 PRESENCE OF AUTOMATIC (IMPLANTABLE) CARDIAC DEFIBRILLATOR: ICD-10-CM

## 2022-02-07 DIAGNOSIS — I50.42 CHRONIC COMBINED SYSTOLIC (CONGESTIVE) AND DIASTOLIC (CONGESTIVE) HEART FAILURE: ICD-10-CM

## 2022-02-07 DIAGNOSIS — I42.9 CARDIOMYOPATHY, UNSPECIFIED: ICD-10-CM

## 2022-02-07 DIAGNOSIS — I47.20 VENTRICULAR TACHYCARDIA: ICD-10-CM

## 2022-02-07 PROCEDURE — 93296 REM INTERROG EVL PM/IDS: CPT | Performed by: INTERNAL MEDICINE

## 2022-02-07 NOTE — TELEPHONE ENCOUNTER
I saw Ms Quinones for DM follow up last week. She was very overwhelmed with seeing so many different providers and different recommendations from each. I offered to loop in cardiology and nephrology.   Ms Quinones is currently prescribed basal insulin and Tradjenta 5mg daily. Her glucose is controlled - actually, recent hypoglycemia so I am reducing her insulin. She was advised to stop her Tradjenta and start Farxiga by PCP.  From an endocrine standpoint SGLT2i is not recommended for GFR <30. However studies do show both cardiac and kidney protective benefits.  She is hesitant about the Farxiga given potential side effects. I reached out to her providers to ask if they are comfortable with the patient continuing DPP4i.  Dr Bain, Dr Mccoy and ANGELIC Swanson Np are all agreeable to her continuing this if it optimizes glucose control and patient preference.   I spoke with Ms Quinones today and reduced her insulin given hypoglycemia. We agreed to hold off on restarting oral medication until hypoglycemia has resolved THEN reintroduce DPP4i and reduce insulin.     Patient verbalized understanding. All questions answered.   Will CC PCP.

## 2022-02-07 NOTE — TELEPHONE ENCOUNTER
Received and reviewed glucose.    80-130s    Current Regimen:  Lantus 22units DAILY    Hypoglycemia: Yes - twice - AM  Hyperglycemia: None    Recommended Medication Changes:  Lantus 18units DAILY     Instructed to send glucose logs in 4 days.  Reach out to me sooner for any glucose <70 or consistently >200.    Medication list updated.  Findings and recommendations reviewed with the patient.  All questions answered.

## 2022-02-08 ENCOUNTER — LAB VISIT (OUTPATIENT)
Dept: LAB | Facility: HOSPITAL | Age: 82
End: 2022-02-08
Attending: STUDENT IN AN ORGANIZED HEALTH CARE EDUCATION/TRAINING PROGRAM
Payer: MEDICARE

## 2022-02-08 DIAGNOSIS — I50.42 CHRONIC COMBINED SYSTOLIC (CONGESTIVE) AND DIASTOLIC (CONGESTIVE) HEART FAILURE: ICD-10-CM

## 2022-02-08 DIAGNOSIS — Z79.4 CONTROLLED TYPE 2 DIABETES MELLITUS WITH BOTH EYES AFFECTED BY MILD NONPROLIFERATIVE RETINOPATHY AND MACULAR EDEMA, WITH LONG-TERM CURRENT USE OF INSULIN: ICD-10-CM

## 2022-02-08 DIAGNOSIS — E11.3213 CONTROLLED TYPE 2 DIABETES MELLITUS WITH BOTH EYES AFFECTED BY MILD NONPROLIFERATIVE RETINOPATHY AND MACULAR EDEMA, WITH LONG-TERM CURRENT USE OF INSULIN: ICD-10-CM

## 2022-02-08 LAB
ALBUMIN SERPL BCP-MCNC: 2.9 G/DL (ref 3.5–5.2)
ALP SERPL-CCNC: 135 U/L (ref 55–135)
ALT SERPL W/O P-5'-P-CCNC: 14 U/L (ref 10–44)
ANION GAP SERPL CALC-SCNC: 9 MMOL/L (ref 8–16)
AST SERPL-CCNC: 19 U/L (ref 10–40)
BASOPHILS # BLD AUTO: 0.06 K/UL (ref 0–0.2)
BASOPHILS NFR BLD: 0.8 % (ref 0–1.9)
BILIRUB SERPL-MCNC: 0.3 MG/DL (ref 0.1–1)
BUN SERPL-MCNC: 37 MG/DL (ref 8–23)
CALCIUM SERPL-MCNC: 9.1 MG/DL (ref 8.7–10.5)
CHLORIDE SERPL-SCNC: 107 MMOL/L (ref 95–110)
CHOLEST SERPL-MCNC: 110 MG/DL (ref 120–199)
CHOLEST/HDLC SERPL: 2.5 {RATIO} (ref 2–5)
CO2 SERPL-SCNC: 18 MMOL/L (ref 23–29)
CREAT SERPL-MCNC: 1.8 MG/DL (ref 0.5–1.4)
DIFFERENTIAL METHOD: ABNORMAL
EOSINOPHIL # BLD AUTO: 0.4 K/UL (ref 0–0.5)
EOSINOPHIL NFR BLD: 5.2 % (ref 0–8)
ERYTHROCYTE [DISTWIDTH] IN BLOOD BY AUTOMATED COUNT: 13.3 % (ref 11.5–14.5)
EST. GFR  (AFRICAN AMERICAN): 29.8 ML/MIN/1.73 M^2
EST. GFR  (NON AFRICAN AMERICAN): 25.8 ML/MIN/1.73 M^2
ESTIMATED AVG GLUCOSE: 117 MG/DL (ref 68–131)
GLUCOSE SERPL-MCNC: 78 MG/DL (ref 70–110)
HBA1C MFR BLD: 5.7 % (ref 4–5.6)
HCT VFR BLD AUTO: 32.2 % (ref 37–48.5)
HDLC SERPL-MCNC: 44 MG/DL (ref 40–75)
HDLC SERPL: 40 % (ref 20–50)
HGB BLD-MCNC: 9.9 G/DL (ref 12–16)
IMM GRANULOCYTES # BLD AUTO: 0.07 K/UL (ref 0–0.04)
IMM GRANULOCYTES NFR BLD AUTO: 0.9 % (ref 0–0.5)
LDLC SERPL CALC-MCNC: 43.8 MG/DL (ref 63–159)
LYMPHOCYTES # BLD AUTO: 1.1 K/UL (ref 1–4.8)
LYMPHOCYTES NFR BLD: 14.1 % (ref 18–48)
MCH RBC QN AUTO: 28.2 PG (ref 27–31)
MCHC RBC AUTO-ENTMCNC: 30.7 G/DL (ref 32–36)
MCV RBC AUTO: 92 FL (ref 82–98)
MONOCYTES # BLD AUTO: 0.5 K/UL (ref 0.3–1)
MONOCYTES NFR BLD: 7.1 % (ref 4–15)
NEUTROPHILS # BLD AUTO: 5.4 K/UL (ref 1.8–7.7)
NEUTROPHILS NFR BLD: 71.9 % (ref 38–73)
NONHDLC SERPL-MCNC: 66 MG/DL
NRBC BLD-RTO: 0 /100 WBC
PLATELET # BLD AUTO: 271 K/UL (ref 150–450)
PMV BLD AUTO: 11.7 FL (ref 9.2–12.9)
POTASSIUM SERPL-SCNC: 4.3 MMOL/L (ref 3.5–5.1)
PROT SERPL-MCNC: 7.1 G/DL (ref 6–8.4)
RBC # BLD AUTO: 3.51 M/UL (ref 4–5.4)
SODIUM SERPL-SCNC: 134 MMOL/L (ref 136–145)
TRIGL SERPL-MCNC: 111 MG/DL (ref 30–150)
WBC # BLD AUTO: 7.5 K/UL (ref 3.9–12.7)

## 2022-02-08 PROCEDURE — 85025 COMPLETE CBC W/AUTO DIFF WBC: CPT | Performed by: STUDENT IN AN ORGANIZED HEALTH CARE EDUCATION/TRAINING PROGRAM

## 2022-02-08 PROCEDURE — 80061 LIPID PANEL: CPT | Performed by: STUDENT IN AN ORGANIZED HEALTH CARE EDUCATION/TRAINING PROGRAM

## 2022-02-08 PROCEDURE — 36415 COLL VENOUS BLD VENIPUNCTURE: CPT | Mod: PO | Performed by: STUDENT IN AN ORGANIZED HEALTH CARE EDUCATION/TRAINING PROGRAM

## 2022-02-08 PROCEDURE — 80053 COMPREHEN METABOLIC PANEL: CPT | Performed by: STUDENT IN AN ORGANIZED HEALTH CARE EDUCATION/TRAINING PROGRAM

## 2022-02-08 PROCEDURE — 83036 HEMOGLOBIN GLYCOSYLATED A1C: CPT | Performed by: STUDENT IN AN ORGANIZED HEALTH CARE EDUCATION/TRAINING PROGRAM

## 2022-02-10 ENCOUNTER — OFFICE VISIT (OUTPATIENT)
Dept: OPHTHALMOLOGY | Facility: CLINIC | Age: 82
End: 2022-02-10
Payer: MEDICARE

## 2022-02-10 DIAGNOSIS — E11.3213 CONTROLLED TYPE 2 DIABETES MELLITUS WITH BOTH EYES AFFECTED BY MILD NONPROLIFERATIVE RETINOPATHY AND MACULAR EDEMA, WITH LONG-TERM CURRENT USE OF INSULIN: Primary | ICD-10-CM

## 2022-02-10 DIAGNOSIS — H35.033 HYPERTENSIVE RETINOPATHY, BILATERAL: ICD-10-CM

## 2022-02-10 DIAGNOSIS — Z79.4 CONTROLLED TYPE 2 DIABETES MELLITUS WITH BOTH EYES AFFECTED BY MILD NONPROLIFERATIVE RETINOPATHY AND MACULAR EDEMA, WITH LONG-TERM CURRENT USE OF INSULIN: Primary | ICD-10-CM

## 2022-02-10 PROCEDURE — 99999 PR PBB SHADOW E&M-EST. PATIENT-LVL III: CPT | Mod: PBBFAC,,, | Performed by: OPHTHALMOLOGY

## 2022-02-10 PROCEDURE — 92201 OPSCPY EXTND RTA DRAW UNI/BI: CPT | Mod: S$GLB,,, | Performed by: OPHTHALMOLOGY

## 2022-02-10 PROCEDURE — 92014 COMPRE OPH EXAM EST PT 1/>: CPT | Mod: S$GLB,,, | Performed by: OPHTHALMOLOGY

## 2022-02-10 PROCEDURE — 99999 PR PBB SHADOW E&M-EST. PATIENT-LVL III: ICD-10-PCS | Mod: PBBFAC,,, | Performed by: OPHTHALMOLOGY

## 2022-02-10 PROCEDURE — 3288F PR FALLS RISK ASSESSMENT DOCUMENTED: ICD-10-PCS | Mod: CPTII,S$GLB,, | Performed by: OPHTHALMOLOGY

## 2022-02-10 PROCEDURE — 1126F AMNT PAIN NOTED NONE PRSNT: CPT | Mod: CPTII,S$GLB,, | Performed by: OPHTHALMOLOGY

## 2022-02-10 PROCEDURE — 1159F PR MEDICATION LIST DOCUMENTED IN MEDICAL RECORD: ICD-10-PCS | Mod: CPTII,S$GLB,, | Performed by: OPHTHALMOLOGY

## 2022-02-10 PROCEDURE — 1101F PR PT FALLS ASSESS DOC 0-1 FALLS W/OUT INJ PAST YR: ICD-10-PCS | Mod: CPTII,S$GLB,, | Performed by: OPHTHALMOLOGY

## 2022-02-10 PROCEDURE — 92014 PR EYE EXAM, EST PATIENT,COMPREHESV: ICD-10-PCS | Mod: S$GLB,,, | Performed by: OPHTHALMOLOGY

## 2022-02-10 PROCEDURE — 3288F FALL RISK ASSESSMENT DOCD: CPT | Mod: CPTII,S$GLB,, | Performed by: OPHTHALMOLOGY

## 2022-02-10 PROCEDURE — 1160F RVW MEDS BY RX/DR IN RCRD: CPT | Mod: CPTII,S$GLB,, | Performed by: OPHTHALMOLOGY

## 2022-02-10 PROCEDURE — 1159F MED LIST DOCD IN RCRD: CPT | Mod: CPTII,S$GLB,, | Performed by: OPHTHALMOLOGY

## 2022-02-10 PROCEDURE — 92134 POSTERIOR SEGMENT OCT RETINA (OCULAR COHERENCE TOMOGRAPHY)-BOTH EYES: ICD-10-PCS | Mod: S$GLB,,, | Performed by: OPHTHALMOLOGY

## 2022-02-10 PROCEDURE — 1101F PT FALLS ASSESS-DOCD LE1/YR: CPT | Mod: CPTII,S$GLB,, | Performed by: OPHTHALMOLOGY

## 2022-02-10 PROCEDURE — 92134 CPTRZ OPH DX IMG PST SGM RTA: CPT | Mod: S$GLB,,, | Performed by: OPHTHALMOLOGY

## 2022-02-10 PROCEDURE — 1126F PR PAIN SEVERITY QUANTIFIED, NO PAIN PRESENT: ICD-10-PCS | Mod: CPTII,S$GLB,, | Performed by: OPHTHALMOLOGY

## 2022-02-10 PROCEDURE — 1160F PR REVIEW ALL MEDS BY PRESCRIBER/CLIN PHARMACIST DOCUMENTED: ICD-10-PCS | Mod: CPTII,S$GLB,, | Performed by: OPHTHALMOLOGY

## 2022-02-10 PROCEDURE — 92201 PR OPHTHALMOSCOPY, EXT, W/RET DRAW/SCLERAL DEPR, I&R, UNI/BI: ICD-10-PCS | Mod: S$GLB,,, | Performed by: OPHTHALMOLOGY

## 2022-02-10 RX ORDER — INFLUENZA VACCINE, ADJUVANTED 15; 15; 15; 15 UG/.5ML; UG/.5ML; UG/.5ML; UG/.5ML
INJECTION, SUSPENSION INTRAMUSCULAR
Status: ON HOLD | COMMUNITY
Start: 2021-11-23 | End: 2022-04-04

## 2022-02-10 NOTE — PROGRESS NOTES
HPI     DLS 10/08/2021    C/o difficulty reading. No pain. No f/f.        OCT - OD trace parafoveal ME  OS - central cystoid edema - small increase    Prior FA - Foveal MA's OS   No NV of NP OU      Lost  to COVID complications 12/20    A/P    1. 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 DME OS  S/p Avastin OS x 5  S/p Ozurdex OS x 5 9/19  S/p Iluvien 9/19    Doing well, will need chronic steroid    Small increase today OD, but good Va - will follow closely  - may need additional Iluvien OS soon    Can consider light focal to parafoveal MA's OS if worsens      3. HTN Ret OU  BS/BP/chol control    4. PCIOL OU  With small PCO      4 months OCT and dilate OU

## 2022-02-10 NOTE — PROGRESS NOTES
Ms. Quinones is a patient of Dr. Bain and was last seen in [facility] 11/10/2021.      Subjective:   Patient ID:  Myesha Quinones is a 81 y.o. female who presents for follow-up of Nonischemic cardiomyopathy  (3 month f/u )    Problems:  CAD  DM Type II  Non-ischemic cardiomyopathy  Pulmonary hypertension  COPD  LBBB s/p CRT-D  -diltiazem   HTN  HLD  Severe KHOA, using cpap  Hx Breast CA  CKD stage IV      HPI  Ms. Quinones is in clinic today for HF follow up.  In November, Dr. Bain decreased her furosemide to every other day.  Her weight is down 4 pounds.  Her PCP changed her lasix to PRN wt gain because of renal dysfunction.  Patient has history of obstructive sleep apnea.  Reports nightly use of CPAP machine and denies any associated sx of KHOA.  Her home BP is low to low normal.         Review of Systems   Constitutional: Negative for decreased appetite, diaphoresis, malaise/fatigue, weight gain and weight loss.   Eyes: Negative for visual disturbance.   Cardiovascular: Positive for dyspnea on exertion and leg swelling. Negative for chest pain, claudication, irregular heartbeat, near-syncope, orthopnea, palpitations, paroxysmal nocturnal dyspnea and syncope.        Denies chest pressure   Respiratory: Positive for shortness of breath. Negative for cough, hemoptysis, sleep disturbances due to breathing and snoring.    Endocrine: Negative for cold intolerance and heat intolerance.   Hematologic/Lymphatic: Negative for bleeding problem. Does not bruise/bleed easily.   Musculoskeletal: Negative for myalgias.   Gastrointestinal: Negative for bloating, abdominal pain, anorexia, change in bowel habit, constipation, diarrhea, nausea and vomiting.   Neurological: Negative for difficulty with concentration, disturbances in coordination, excessive daytime sleepiness, dizziness, headaches, light-headedness, loss of balance, numbness and weakness.   Psychiatric/Behavioral: The patient does not have insomnia.         Allergies and current medications updated and reviewed:  Review of patient's allergies indicates:   Allergen Reactions    Iodine and iodide containing products Hives    Nifedipine      weakness     Current Outpatient Medications   Medication Sig    acetaminophen (TYLENOL) 500 MG tablet Take 1,000 mg by mouth daily as needed for Pain.    albuterol (PROVENTIL/VENTOLIN HFA) 90 mcg/actuation inhaler INHALE 2 PUFFS INTO THE LUNGS EVERY 6 (SIX) HOURS AS NEEDED FOR WHEEZING. RESCUE    albuterol-ipratropium (DUO-NEB) 2.5 mg-0.5 mg/3 mL nebulizer solution TAKE 3 MLS BY NEBULIZATION EVERY 4 (FOUR) HOURS AS NEEDED FOR WHEEZING.    ascorbic acid, vitamin C, (VITAMIN C) 100 MG tablet Take 100 mg by mouth once daily.    blood sugar diagnostic (ACCU-CHEK HECTOR) Strp Uses Accu-Check Hector meter to test BG 4x/day    carvediloL (COREG) 25 MG tablet TAKE 2 TABLETS (50 MG TOTAL) BY MOUTH 2 (TWO) TIMES DAILY.    cholecalciferol, vitamin D3, (VITAMIN D3) 50 mcg (2,000 unit) Tab Take 1 tablet by mouth once daily.     cloNIDine 0.1 mg/24 hr td ptwk (CATAPRES) 0.1 mg/24 hr Place 1 patch onto the skin every Tuesday.    CYANOCOBALAMIN, VITAMIN B-12, (B-12 DOTS ORAL) Take 1 tablet by mouth once daily.    diltiaZEM (CARDIZEM CD) 240 MG 24 hr capsule Take 1 capsule (240 mg total) by mouth once daily.    FLUAD QUAD 2021-22,65Y UP,,PF, 60 mcg (15 mcg x 4)/0.5 mL Syrg     fluticasone propionate (FLONASE) 50 mcg/actuation nasal spray 2 sprays (100 mcg total) by Each Nostril route once daily.    fluticasone-salmeterol diskus inhaler 250-50 mcg Inhale 1 puff into the lungs 2 (two) times daily. Controller    furosemide (LASIX) 20 MG tablet Take one tablet every other day, for edema take one daily for 3 days only (Patient taking differently: Take one tablet every other day, for edema take one daily for 3 days only. PATIENT TAKING AS NEEDED)    hydrALAZINE (APRESOLINE) 100 MG tablet TAKE 1 TABLET BY MOUTH THREE TIMES A DAY     "insulin (LANTUS SOLOSTAR U-100 INSULIN) glargine 100 units/mL (3mL) SubQ pen Inject 22 Units into the skin once daily. Increase per providers instruction. Max TDD 40units. (Patient taking differently: Inject 18 Units into the skin once daily. Increase per providers instruction. Max TDD 40units.)    irbesartan (AVAPRO) 300 MG tablet Take 1 tablet (300 mg total) by mouth every evening.    lancets (ACCU-CHEK SOFTCLIX LANCETS) Misc Uses Accu-Chek Angelica meter to test BG 1x/day    magnesium oxide (MAG-OX) 400 mg tablet Take 1 tablet (400 mg total) by mouth once daily.    montelukast (SINGULAIR) 10 mg tablet Take 1 tablet (10 mg total) by mouth every evening.    nitroGLYCERIN (NITROSTAT) 0.4 MG SL tablet Place 0.4 mg under the tongue every 5 (five) minutes as needed for Chest pain.     omega-3 fatty acids 500 mg Cap Take 1 capsule by mouth Twice daily.    pen needle, diabetic (BD ULTRA-FINE MINI PEN NEEDLE) 31 gauge x 3/16" Ndle USE WITH LANTUS AT BEDTIME    pravastatin (PRAVACHOL) 40 MG tablet Take 1 tablet (40 mg total) by mouth once daily.    pulse oximeter (PULSE OXIMETER) device by Apply Externally route 2 (two) times a day. Use twice daily at 8 AM and 3 PM and record the value in MyChart as directed.    tiotropium (SPIRIVA) 18 mcg inhalation capsule Inhale 1 capsule (18 mcg total) into the lungs once daily. Controller    VENOFER 100 mg iron/5 mL injection     levocetirizine (XYZAL) 5 MG tablet Take 1 tablet (5 mg total) by mouth every evening.     No current facility-administered medications for this visit.       Objective:     Left Arm BP - Sittin/72 (22 0935)    BP (!) 169/70 (BP Location: Left arm, Patient Position: Sitting, BP Method: Medium (Automatic))   Pulse 72 Comment: 75 after walking  Ht 5' 3" (1.6 m)   Wt 64 kg (141 lb 1.5 oz)   LMP  (LMP Unknown)   SpO2 97% Comment: 94% after walking  BMI 24.99 kg/m²       Physical Exam  Vitals and nursing note reviewed.   Constitutional:  "      General: She is active. She is not in acute distress.Vital signs are normal.      Appearance: Normal appearance. She is well-developed and well-nourished. She is not diaphoretic.   HENT:      Head: Normocephalic and atraumatic.   Eyes:      General: Lids are normal. No scleral icterus.     Conjunctiva/sclera: Conjunctivae normal.   Neck:      Vascular: Normal carotid pulses. Hepatojugular reflux present. No carotid bruit or JVD.   Cardiovascular:      Rate and Rhythm: Normal rate and regular rhythm.      Chest Wall: PMI is not displaced.      Pulses: Intact distal pulses.           Carotid pulses are 2+ on the right side and 2+ on the left side.       Radial pulses are 2+ on the right side and 2+ on the left side.        Dorsalis pedis pulses are 2+ on the right side and 2+ on the left side.        Posterior tibial pulses are 1+ on the right side and 1+ on the left side.      Heart sounds: S1 normal and S2 normal. No murmur heard.  No friction rub. No gallop.    Pulmonary:      Effort: Pulmonary effort is normal. No respiratory distress.      Breath sounds: Examination of the right-lower field reveals decreased breath sounds. Examination of the left-lower field reveals decreased breath sounds. Decreased breath sounds present. No wheezing, rhonchi or rales.   Chest:      Chest wall: No tenderness.   Abdominal:      General: Bowel sounds are normal. There is no distension, ascites or abdominal bruit.      Palpations: Abdomen is soft. There is no fluid wave, hepatosplenomegaly or pulsatile mass.      Tenderness: There is no abdominal tenderness.   Musculoskeletal:         General: Swelling (BLE +2-+3 to knees and +1 thighs) present.      Cervical back: Neck supple.      Right lower leg: Edema present.      Left lower leg: Edema present.   Skin:     General: Skin is warm, dry and intact.      Findings: No rash.      Nails: There is no clubbing.   Neurological:      Mental Status: She is alert and oriented to  person, place, and time.      Gait: Gait normal.   Psychiatric:         Mood and Affect: Mood and affect normal.         Speech: Speech normal.         Behavior: Behavior normal.         Thought Content: Thought content normal.         Cognition and Memory: Cognition and memory normal.         Judgment: Judgment normal.         Chemistry        Component Value Date/Time     (L) 02/08/2022 0716    K 4.3 02/08/2022 0716     02/08/2022 0716    CO2 18 (L) 02/08/2022 0716    BUN 37 (H) 02/08/2022 0716    CREATININE 1.8 (H) 02/08/2022 0716    GLU 78 02/08/2022 0716        Component Value Date/Time    CALCIUM 9.1 02/08/2022 0716    ALKPHOS 135 02/08/2022 0716    AST 19 02/08/2022 0716    ALT 14 02/08/2022 0716    BILITOT 0.3 02/08/2022 0716    ESTGFRAFRICA 29.8 (A) 02/08/2022 0716    EGFRNONAA 25.8 (A) 02/08/2022 0716        Lab Results   Component Value Date    HGBA1C 5.7 (H) 02/08/2022     Recent Labs   Lab 06/02/21  1152 07/13/21  1010 08/24/21  1102 08/24/21  1102 09/17/21  1001 12/07/21  0840 02/08/22  0716 02/11/22  1117   WBC 6.85  6.85   < > 8.49   < > 7.36   < > 7.50  --    Hemoglobin 9.3 L  9.3 L   < > 9.1 L   < > 9.1 L   < > 9.9 L  --    Hematocrit 30.5 L  30.5 L   < > 29.0 L   < > 29.4 L   < > 32.2 L  --    MCV 97  97   < > 93   < > 94   < > 92  --    Platelets 211  211   < > 263   < > 220   < > 271  --     H   < > 611 H  --  561 H  --   --  685 H   TSH 0.592  --   --   --   --   --   --   --    Cholesterol  --   --   --   --   --   --  110 L  --    HDL  --   --   --   --   --   --  44  --    LDL Cholesterol  --   --   --   --   --   --  43.8 L  --    Triglycerides  --   --   --   --   --   --  111  --    HDL/Cholesterol Ratio  --   --   --   --   --   --  40.0  --     < > = values in this interval not displayed.       Recent Labs   Lab 12/07/20  1424   INR 1.0        Test(s) Reviewed  I have reviewed the following in detail:  [] Stress test   [] Angiography   [] Echocardiogram   [] Labs    [] Other:         Assessment/Plan:   1. Cardiomyopathy, ischemic  Appears hypervolemic and she has NYHA class II-III sx. Instructed to take furosemide 20 mg daily for 5 days and call me on Monday with her wt and how she is feeling.  BMP in 1-2 weeks.    - furosemide (LASIX) 20 MG tablet; Take 1 tablet (20 mg total) by mouth once daily.  Dispense: 90 tablet; Refill: 2    2. Chronic combined systolic (congestive) and diastolic (congestive) heart failure  See #1     - X-Ray Chest PA And Lateral; Future  - BNP; Future  - Basic Metabolic Panel; Future  - furosemide (LASIX) 20 MG tablet; Take 1 tablet (20 mg total) by mouth once daily.  Dispense: 90 tablet; Refill: 2    3. Hypertension associated with diabetes  BP not at goal <140/90. Continue current regimen.  Diurese and re-evaluate      4. Calcification of aorta      5. Biventricular ICD (implantable cardioverter-defibrillator) in place  Denies discharge.     6. CKD (chronic kidney disease) stage 4, GFR 15-29 ml/min  Monitor.    7. 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 F/U with PCP as planned      8. History of breast cancer      9. Hyperlipidemia associated with type 2 diabetes mellitus  LDL at goal <70. No changes      10. Obstructive sleep apnea  Encouraged nightly use of cpap and annual f/u with sleep clinic.      11. Overweight (BMI 25.0-29.9)  BMI 25     12. White coat syndrome with diagnosis of hypertension  Reports better controlled bp at home and only elevated in clinic.  Will have her do her bp for 2 weeks and bring log.        A copy of this note will be forwarded to Dr. Bain and Dr. Rothman    Follow up in about 2 weeks (around 2/25/2022).

## 2022-02-11 ENCOUNTER — HOSPITAL ENCOUNTER (OUTPATIENT)
Dept: CARDIOLOGY | Facility: CLINIC | Age: 82
Discharge: HOME | End: 2022-02-11
Payer: MEDICARE

## 2022-02-11 ENCOUNTER — OFFICE VISIT (OUTPATIENT)
Dept: CARDIOLOGY | Facility: CLINIC | Age: 82
End: 2022-02-11
Payer: MEDICARE

## 2022-02-11 ENCOUNTER — HOSPITAL ENCOUNTER (OUTPATIENT)
Dept: RADIOLOGY | Facility: HOSPITAL | Age: 82
Discharge: HOME | End: 2022-02-11
Attending: NURSE PRACTITIONER
Payer: MEDICARE

## 2022-02-11 VITALS
HEART RATE: 72 BPM | HEIGHT: 63 IN | SYSTOLIC BLOOD PRESSURE: 169 MMHG | OXYGEN SATURATION: 97 % | WEIGHT: 141.13 LBS | DIASTOLIC BLOOD PRESSURE: 70 MMHG | BODY MASS INDEX: 25.01 KG/M2

## 2022-02-11 DIAGNOSIS — E11.3213 CONTROLLED TYPE 2 DIABETES MELLITUS WITH BOTH EYES AFFECTED BY MILD NONPROLIFERATIVE RETINOPATHY AND MACULAR EDEMA, WITH LONG-TERM CURRENT USE OF INSULIN: ICD-10-CM

## 2022-02-11 DIAGNOSIS — Z85.3 HISTORY OF BREAST CANCER: ICD-10-CM

## 2022-02-11 DIAGNOSIS — Z95.810 BIVENTRICULAR ICD (IMPLANTABLE CARDIOVERTER-DEFIBRILLATOR) IN PLACE: ICD-10-CM

## 2022-02-11 DIAGNOSIS — I25.5 CARDIOMYOPATHY, ISCHEMIC: Primary | ICD-10-CM

## 2022-02-11 DIAGNOSIS — N18.4 CKD (CHRONIC KIDNEY DISEASE) STAGE 4, GFR 15-29 ML/MIN: ICD-10-CM

## 2022-02-11 DIAGNOSIS — I50.42 CHRONIC COMBINED SYSTOLIC (CONGESTIVE) AND DIASTOLIC (CONGESTIVE) HEART FAILURE: ICD-10-CM

## 2022-02-11 DIAGNOSIS — E78.5 HYPERLIPIDEMIA ASSOCIATED WITH TYPE 2 DIABETES MELLITUS: ICD-10-CM

## 2022-02-11 DIAGNOSIS — I70.0 CALCIFICATION OF AORTA: ICD-10-CM

## 2022-02-11 DIAGNOSIS — I10 WHITE COAT SYNDROME WITH DIAGNOSIS OF HYPERTENSION: ICD-10-CM

## 2022-02-11 DIAGNOSIS — G47.33 OBSTRUCTIVE SLEEP APNEA: ICD-10-CM

## 2022-02-11 DIAGNOSIS — Z79.4 CONTROLLED TYPE 2 DIABETES MELLITUS WITH BOTH EYES AFFECTED BY MILD NONPROLIFERATIVE RETINOPATHY AND MACULAR EDEMA, WITH LONG-TERM CURRENT USE OF INSULIN: ICD-10-CM

## 2022-02-11 DIAGNOSIS — E11.69 HYPERLIPIDEMIA ASSOCIATED WITH TYPE 2 DIABETES MELLITUS: ICD-10-CM

## 2022-02-11 DIAGNOSIS — E11.59 HYPERTENSION ASSOCIATED WITH DIABETES: ICD-10-CM

## 2022-02-11 DIAGNOSIS — I15.2 HYPERTENSION ASSOCIATED WITH DIABETES: ICD-10-CM

## 2022-02-11 DIAGNOSIS — E66.3 OVERWEIGHT (BMI 25.0-29.9): ICD-10-CM

## 2022-02-11 PROCEDURE — 93005 EKG 12-LEAD: ICD-10-PCS | Mod: S$GLB,,, | Performed by: NURSE PRACTITIONER

## 2022-02-11 PROCEDURE — 99214 OFFICE O/P EST MOD 30 MIN: CPT | Mod: S$GLB,,, | Performed by: NURSE PRACTITIONER

## 2022-02-11 PROCEDURE — 93010 EKG 12-LEAD: ICD-10-PCS | Mod: S$GLB,,, | Performed by: INTERNAL MEDICINE

## 2022-02-11 PROCEDURE — 71046 XR CHEST PA AND LATERAL: ICD-10-PCS | Mod: 26,,, | Performed by: RADIOLOGY

## 2022-02-11 PROCEDURE — 1160F RVW MEDS BY RX/DR IN RCRD: CPT | Mod: CPTII,S$GLB,, | Performed by: NURSE PRACTITIONER

## 2022-02-11 PROCEDURE — 99214 PR OFFICE/OUTPT VISIT, EST, LEVL IV, 30-39 MIN: ICD-10-PCS | Mod: S$GLB,,, | Performed by: NURSE PRACTITIONER

## 2022-02-11 PROCEDURE — 1159F PR MEDICATION LIST DOCUMENTED IN MEDICAL RECORD: ICD-10-PCS | Mod: CPTII,S$GLB,, | Performed by: NURSE PRACTITIONER

## 2022-02-11 PROCEDURE — 1160F PR REVIEW ALL MEDS BY PRESCRIBER/CLIN PHARMACIST DOCUMENTED: ICD-10-PCS | Mod: CPTII,S$GLB,, | Performed by: NURSE PRACTITIONER

## 2022-02-11 PROCEDURE — 3077F PR MOST RECENT SYSTOLIC BLOOD PRESSURE >= 140 MM HG: ICD-10-PCS | Mod: CPTII,S$GLB,, | Performed by: NURSE PRACTITIONER

## 2022-02-11 PROCEDURE — 71046 X-RAY EXAM CHEST 2 VIEWS: CPT | Mod: TC,FY

## 2022-02-11 PROCEDURE — 99999 PR PBB SHADOW E&M-EST. PATIENT-LVL V: ICD-10-PCS | Mod: PBBFAC,,, | Performed by: NURSE PRACTITIONER

## 2022-02-11 PROCEDURE — 3078F DIAST BP <80 MM HG: CPT | Mod: CPTII,S$GLB,, | Performed by: NURSE PRACTITIONER

## 2022-02-11 PROCEDURE — 1101F PR PT FALLS ASSESS DOC 0-1 FALLS W/OUT INJ PAST YR: ICD-10-PCS | Mod: CPTII,S$GLB,, | Performed by: NURSE PRACTITIONER

## 2022-02-11 PROCEDURE — 1159F MED LIST DOCD IN RCRD: CPT | Mod: CPTII,S$GLB,, | Performed by: NURSE PRACTITIONER

## 2022-02-11 PROCEDURE — 3078F PR MOST RECENT DIASTOLIC BLOOD PRESSURE < 80 MM HG: ICD-10-PCS | Mod: CPTII,S$GLB,, | Performed by: NURSE PRACTITIONER

## 2022-02-11 PROCEDURE — 3288F PR FALLS RISK ASSESSMENT DOCUMENTED: ICD-10-PCS | Mod: CPTII,S$GLB,, | Performed by: NURSE PRACTITIONER

## 2022-02-11 PROCEDURE — 99999 PR PBB SHADOW E&M-EST. PATIENT-LVL V: CPT | Mod: PBBFAC,,, | Performed by: NURSE PRACTITIONER

## 2022-02-11 PROCEDURE — 93005 ELECTROCARDIOGRAM TRACING: CPT | Mod: S$GLB,,, | Performed by: NURSE PRACTITIONER

## 2022-02-11 PROCEDURE — 3288F FALL RISK ASSESSMENT DOCD: CPT | Mod: CPTII,S$GLB,, | Performed by: NURSE PRACTITIONER

## 2022-02-11 PROCEDURE — 1126F AMNT PAIN NOTED NONE PRSNT: CPT | Mod: CPTII,S$GLB,, | Performed by: NURSE PRACTITIONER

## 2022-02-11 PROCEDURE — 1101F PT FALLS ASSESS-DOCD LE1/YR: CPT | Mod: CPTII,S$GLB,, | Performed by: NURSE PRACTITIONER

## 2022-02-11 PROCEDURE — 1126F PR PAIN SEVERITY QUANTIFIED, NO PAIN PRESENT: ICD-10-PCS | Mod: CPTII,S$GLB,, | Performed by: NURSE PRACTITIONER

## 2022-02-11 PROCEDURE — 93010 ELECTROCARDIOGRAM REPORT: CPT | Mod: S$GLB,,, | Performed by: INTERNAL MEDICINE

## 2022-02-11 PROCEDURE — 3077F SYST BP >= 140 MM HG: CPT | Mod: CPTII,S$GLB,, | Performed by: NURSE PRACTITIONER

## 2022-02-11 PROCEDURE — 71046 X-RAY EXAM CHEST 2 VIEWS: CPT | Mod: 26,,, | Performed by: RADIOLOGY

## 2022-02-11 NOTE — PATIENT INSTRUCTIONS
Take the furosemide 20 mg daily for the next 5 days and call me on Monday with your weight and how your breathing and leg swelling are, [phone]

## 2022-02-14 ENCOUNTER — TELEPHONE (OUTPATIENT)
Dept: ENDOCRINOLOGY | Facility: CLINIC | Age: 82
End: 2022-02-14
Payer: MEDICARE

## 2022-02-14 ENCOUNTER — TELEPHONE (OUTPATIENT)
Dept: CARDIOLOGY | Facility: CLINIC | Age: 82
End: 2022-02-14
Payer: MEDICARE

## 2022-02-14 DIAGNOSIS — E11.3213 CONTROLLED TYPE 2 DIABETES MELLITUS WITH BOTH EYES AFFECTED BY MILD NONPROLIFERATIVE RETINOPATHY AND MACULAR EDEMA, WITH LONG-TERM CURRENT USE OF INSULIN: Primary | ICD-10-CM

## 2022-02-14 DIAGNOSIS — Z79.4 CONTROLLED TYPE 2 DIABETES MELLITUS WITH BOTH EYES AFFECTED BY MILD NONPROLIFERATIVE RETINOPATHY AND MACULAR EDEMA, WITH LONG-TERM CURRENT USE OF INSULIN: Primary | ICD-10-CM

## 2022-02-14 RX ORDER — FUROSEMIDE 20 MG/1
20 TABLET ORAL DAILY
Qty: 90 TABLET | Refills: 2 | Status: SHIPPED | OUTPATIENT
Start: 2022-02-14 | End: 2022-03-21

## 2022-02-14 NOTE — TELEPHONE ENCOUNTER
Called to discuss and review glucose.     Fastin-148  Evening: 160-180    Current Regimen:  Lantus 18units DAILY     Hypoglycemia: resolved  Hyperglycemia: Evening     Recommended Medication Changes:  Lantus 20units DAILY     Recommended restarting DPP4 (in place of increasing insulin) but patient reports it is getting too expensive. Will put in referral for PAP.     Instructed to send glucose logs in 7 days.  Reach out to me sooner for any glucose <70 or consistently >200.     Medication list updated.  Findings and recommendations reviewed with the patient.  All questions answered.

## 2022-02-14 NOTE — TELEPHONE ENCOUNTER
----- Message from Theodora Swanson NP sent at 2/14/2022  3:46 PM CST -----  Regarding: RE: Update post furosemide  I sent a new prescription.  I would have her continue the lasix for another week since she is still symptomatic.  We'll see what her labs look like on repeat.  If she's worried, I'm happy to have her get the lab drawn a little sooner.   Thanks,  Theodora    ----- Message -----  From: Talya Gomez RN  Sent: 2/14/2022   2:16 PM CST  To: Theodora Swanson NP, Talya Gomez, RN  Subject: FW: Update post furosemide                       Pt is currently taking 20mg furosemide qd.  She says that she feels a little better.  Wt was 141+ at home on Friday, and 138# today.  Still has swelling but it has improved.  Still has SOb with walking around the house.    Please advise  (also will need a refill on furosemide--[facility])    Talya  ----- Message -----  From: Mary Crum  Sent: 2/14/2022  11:50 AM CST  To: Talya Gomez RN  Subject: Update                                           Pt [phone] says she was asked to call the office after starting her Lasix and let Jordan Swanson know how she's doing.    LOV 2/11/22 Np. Heytens    Thanks

## 2022-02-14 NOTE — TELEPHONE ENCOUNTER
Pt updated on lasix x 1 more week and labs on 2/25 . I also told her to call us after that week to assess swelling/ wt etc.  She verbalized understanding.

## 2022-02-21 ENCOUNTER — TELEPHONE (OUTPATIENT)
Dept: NEPHROLOGY | Facility: CLINIC | Age: 82
End: 2022-02-21
Payer: MEDICARE

## 2022-02-21 ENCOUNTER — HOSPITAL ENCOUNTER (INPATIENT)
Facility: HOSPITAL | Age: 82
LOS: 7 days | Discharge: HOME HEALTH | DRG: 291 | End: 2022-03-01
Attending: EMERGENCY MEDICINE | Admitting: INTERNAL MEDICINE
Payer: MEDICARE

## 2022-02-21 ENCOUNTER — TELEPHONE (OUTPATIENT)
Dept: ENDOCRINOLOGY | Facility: CLINIC | Age: 82
End: 2022-02-21
Payer: MEDICARE

## 2022-02-21 DIAGNOSIS — E11.3213 CONTROLLED TYPE 2 DIABETES MELLITUS WITH BOTH EYES AFFECTED BY MILD NONPROLIFERATIVE RETINOPATHY AND MACULAR EDEMA, WITH LONG-TERM CURRENT USE OF INSULIN: ICD-10-CM

## 2022-02-21 DIAGNOSIS — Z79.4 CONTROLLED TYPE 2 DIABETES MELLITUS WITH BOTH EYES AFFECTED BY MILD NONPROLIFERATIVE RETINOPATHY AND MACULAR EDEMA, WITH LONG-TERM CURRENT USE OF INSULIN: ICD-10-CM

## 2022-02-21 DIAGNOSIS — N18.4 CHRONIC KIDNEY DISEASE, STAGE IV (SEVERE): Primary | ICD-10-CM

## 2022-02-21 DIAGNOSIS — R33.9 URINARY RETENTION: ICD-10-CM

## 2022-02-21 DIAGNOSIS — I50.9 CHF EXACERBATION: ICD-10-CM

## 2022-02-21 DIAGNOSIS — N17.9 ACUTE RENAL FAILURE SUPERIMPOSED ON STAGE 4 CHRONIC KIDNEY DISEASE: ICD-10-CM

## 2022-02-21 DIAGNOSIS — R06.02 SOB (SHORTNESS OF BREATH): ICD-10-CM

## 2022-02-21 DIAGNOSIS — N18.4 ACUTE RENAL FAILURE SUPERIMPOSED ON STAGE 4 CHRONIC KIDNEY DISEASE: ICD-10-CM

## 2022-02-21 DIAGNOSIS — J44.1 CHRONIC OBSTRUCTIVE PULMONARY DISEASE WITH ACUTE EXACERBATION: Primary | ICD-10-CM

## 2022-02-21 DIAGNOSIS — I50.43 ACUTE ON CHRONIC COMBINED SYSTOLIC AND DIASTOLIC HEART FAILURE: ICD-10-CM

## 2022-02-21 DIAGNOSIS — R06.02 SHORTNESS OF BREATH: ICD-10-CM

## 2022-02-21 LAB
ALBUMIN SERPL BCP-MCNC: 3.5 G/DL (ref 3.5–5.2)
ALP SERPL-CCNC: 137 U/L (ref 55–135)
ALT SERPL W/O P-5'-P-CCNC: 17 U/L (ref 10–44)
ANION GAP SERPL CALC-SCNC: 15 MMOL/L (ref 8–16)
AST SERPL-CCNC: 22 U/L (ref 10–40)
BACTERIA #/AREA URNS AUTO: NORMAL /HPF
BASOPHILS # BLD AUTO: 0.06 K/UL (ref 0–0.2)
BASOPHILS NFR BLD: 0.7 % (ref 0–1.9)
BILIRUB SERPL-MCNC: 0.5 MG/DL (ref 0.1–1)
BILIRUB UR QL STRIP: NEGATIVE
BNP SERPL-MCNC: 460 PG/ML (ref 0–99)
BUN SERPL-MCNC: 44 MG/DL (ref 8–23)
CALCIUM SERPL-MCNC: 9.8 MG/DL (ref 8.7–10.5)
CHLORIDE SERPL-SCNC: 101 MMOL/L (ref 95–110)
CLARITY UR REFRACT.AUTO: CLEAR
CO2 SERPL-SCNC: 22 MMOL/L (ref 23–29)
COLOR UR AUTO: ABNORMAL
CREAT SERPL-MCNC: 2.3 MG/DL (ref 0.5–1.4)
CTP QC/QA: YES
DIFFERENTIAL METHOD: ABNORMAL
EOSINOPHIL # BLD AUTO: 0.4 K/UL (ref 0–0.5)
EOSINOPHIL NFR BLD: 4.2 % (ref 0–8)
ERYTHROCYTE [DISTWIDTH] IN BLOOD BY AUTOMATED COUNT: 13.2 % (ref 11.5–14.5)
EST. GFR  (AFRICAN AMERICAN): 22.1 ML/MIN/1.73 M^2
EST. GFR  (NON AFRICAN AMERICAN): 19.2 ML/MIN/1.73 M^2
GLUCOSE SERPL-MCNC: 120 MG/DL (ref 70–110)
GLUCOSE UR QL STRIP: ABNORMAL
HCT VFR BLD AUTO: 35.4 % (ref 37–48.5)
HGB BLD-MCNC: 10.9 G/DL (ref 12–16)
HGB UR QL STRIP: NEGATIVE
HYALINE CASTS UR QL AUTO: 0 /LPF
IMM GRANULOCYTES # BLD AUTO: 0.08 K/UL (ref 0–0.04)
IMM GRANULOCYTES NFR BLD AUTO: 0.9 % (ref 0–0.5)
KETONES UR QL STRIP: NEGATIVE
LEUKOCYTE ESTERASE UR QL STRIP: NEGATIVE
LYMPHOCYTES # BLD AUTO: 1.2 K/UL (ref 1–4.8)
LYMPHOCYTES NFR BLD: 13.7 % (ref 18–48)
MAGNESIUM SERPL-MCNC: 2 MG/DL (ref 1.6–2.6)
MCH RBC QN AUTO: 28.9 PG (ref 27–31)
MCHC RBC AUTO-ENTMCNC: 30.8 G/DL (ref 32–36)
MCV RBC AUTO: 94 FL (ref 82–98)
MICROSCOPIC COMMENT: NORMAL
MONOCYTES # BLD AUTO: 0.6 K/UL (ref 0.3–1)
MONOCYTES NFR BLD: 6.9 % (ref 4–15)
NEUTROPHILS # BLD AUTO: 6.6 K/UL (ref 1.8–7.7)
NEUTROPHILS NFR BLD: 73.6 % (ref 38–73)
NITRITE UR QL STRIP: NEGATIVE
NRBC BLD-RTO: 0 /100 WBC
PH UR STRIP: 6 [PH] (ref 5–8)
PHOSPHATE SERPL-MCNC: 4.6 MG/DL (ref 2.7–4.5)
PLATELET # BLD AUTO: 291 K/UL (ref 150–450)
PMV BLD AUTO: 10.4 FL (ref 9.2–12.9)
POTASSIUM SERPL-SCNC: 4.2 MMOL/L (ref 3.5–5.1)
PROT SERPL-MCNC: 8.1 G/DL (ref 6–8.4)
PROT UR QL STRIP: ABNORMAL
RBC # BLD AUTO: 3.77 M/UL (ref 4–5.4)
RBC #/AREA URNS AUTO: 3 /HPF (ref 0–4)
SARS-COV-2 RDRP RESP QL NAA+PROBE: NEGATIVE
SODIUM SERPL-SCNC: 138 MMOL/L (ref 136–145)
SP GR UR STRIP: 1.01 (ref 1–1.03)
SQUAMOUS #/AREA URNS AUTO: 0 /HPF
TROPONIN I SERPL DL<=0.01 NG/ML-MCNC: 0.01 NG/ML (ref 0–0.03)
URN SPEC COLLECT METH UR: ABNORMAL
WBC # BLD AUTO: 8.89 K/UL (ref 3.9–12.7)
WBC #/AREA URNS AUTO: 1 /HPF (ref 0–5)

## 2022-02-21 PROCEDURE — 93005 ELECTROCARDIOGRAM TRACING: CPT

## 2022-02-21 PROCEDURE — 99220 PR INITIAL OBSERVATION CARE,LEVL III: CPT | Mod: ,,, | Performed by: NURSE PRACTITIONER

## 2022-02-21 PROCEDURE — 27000221 HC OXYGEN, UP TO 24 HOURS

## 2022-02-21 PROCEDURE — 99285 PR EMERGENCY DEPT VISIT,LEVEL V: ICD-10-PCS | Mod: ,,, | Performed by: EMERGENCY MEDICINE

## 2022-02-21 PROCEDURE — 63600175 PHARM REV CODE 636 W HCPCS

## 2022-02-21 PROCEDURE — 85025 COMPLETE CBC W/AUTO DIFF WBC: CPT | Performed by: NURSE PRACTITIONER

## 2022-02-21 PROCEDURE — 94761 N-INVAS EAR/PLS OXIMETRY MLT: CPT

## 2022-02-21 PROCEDURE — U0002 COVID-19 LAB TEST NON-CDC: HCPCS | Performed by: EMERGENCY MEDICINE

## 2022-02-21 PROCEDURE — 25000242 PHARM REV CODE 250 ALT 637 W/ HCPCS

## 2022-02-21 PROCEDURE — 99900035 HC TECH TIME PER 15 MIN (STAT)

## 2022-02-21 PROCEDURE — 80053 COMPREHEN METABOLIC PANEL: CPT | Performed by: NURSE PRACTITIONER

## 2022-02-21 PROCEDURE — 81001 URINALYSIS AUTO W/SCOPE: CPT

## 2022-02-21 PROCEDURE — 93010 ELECTROCARDIOGRAM REPORT: CPT | Mod: ,,, | Performed by: INTERNAL MEDICINE

## 2022-02-21 PROCEDURE — 94640 AIRWAY INHALATION TREATMENT: CPT

## 2022-02-21 PROCEDURE — 99285 EMERGENCY DEPT VISIT HI MDM: CPT | Mod: ,,, | Performed by: EMERGENCY MEDICINE

## 2022-02-21 PROCEDURE — 84484 ASSAY OF TROPONIN QUANT: CPT | Performed by: NURSE PRACTITIONER

## 2022-02-21 PROCEDURE — G0378 HOSPITAL OBSERVATION PER HR: HCPCS

## 2022-02-21 PROCEDURE — 84100 ASSAY OF PHOSPHORUS: CPT | Performed by: NURSE PRACTITIONER

## 2022-02-21 PROCEDURE — 83735 ASSAY OF MAGNESIUM: CPT | Performed by: NURSE PRACTITIONER

## 2022-02-21 PROCEDURE — 83880 ASSAY OF NATRIURETIC PEPTIDE: CPT | Performed by: NURSE PRACTITIONER

## 2022-02-21 PROCEDURE — 99285 EMERGENCY DEPT VISIT HI MDM: CPT | Mod: 25

## 2022-02-21 PROCEDURE — 93010 EKG 12-LEAD: ICD-10-PCS | Mod: ,,, | Performed by: INTERNAL MEDICINE

## 2022-02-21 PROCEDURE — 99220 PR INITIAL OBSERVATION CARE,LEVL III: ICD-10-PCS | Mod: ,,, | Performed by: NURSE PRACTITIONER

## 2022-02-21 PROCEDURE — 96375 TX/PRO/DX INJ NEW DRUG ADDON: CPT

## 2022-02-21 RX ORDER — FUROSEMIDE 10 MG/ML
40 INJECTION INTRAMUSCULAR; INTRAVENOUS
Status: COMPLETED | OUTPATIENT
Start: 2022-02-21 | End: 2022-02-21

## 2022-02-21 RX ORDER — FLUTICASONE FUROATE AND VILANTEROL 100; 25 UG/1; UG/1
1 POWDER RESPIRATORY (INHALATION) DAILY
Refills: 3 | Status: DISCONTINUED | OUTPATIENT
Start: 2022-02-22 | End: 2022-03-01 | Stop reason: HOSPADM

## 2022-02-21 RX ORDER — HYDRALAZINE HYDROCHLORIDE 25 MG/1
25 TABLET, FILM COATED ORAL ONCE
Status: COMPLETED | OUTPATIENT
Start: 2022-02-21 | End: 2022-02-22

## 2022-02-21 RX ORDER — HYDRALAZINE HYDROCHLORIDE 25 MG/1
100 TABLET, FILM COATED ORAL 3 TIMES DAILY
Status: DISCONTINUED | OUTPATIENT
Start: 2022-02-22 | End: 2022-03-01 | Stop reason: HOSPADM

## 2022-02-21 RX ORDER — CHOLECALCIFEROL (VITAMIN D3) 25 MCG
2000 TABLET ORAL DAILY
Status: DISCONTINUED | OUTPATIENT
Start: 2022-02-22 | End: 2022-03-01 | Stop reason: HOSPADM

## 2022-02-21 RX ORDER — IPRATROPIUM BROMIDE AND ALBUTEROL SULFATE 2.5; .5 MG/3ML; MG/3ML
3 SOLUTION RESPIRATORY (INHALATION) EVERY 4 HOURS PRN
Status: DISCONTINUED | OUTPATIENT
Start: 2022-02-22 | End: 2022-03-01 | Stop reason: HOSPADM

## 2022-02-21 RX ORDER — CARVEDILOL 25 MG/1
25 TABLET ORAL 2 TIMES DAILY
Status: DISCONTINUED | OUTPATIENT
Start: 2022-02-22 | End: 2022-03-01 | Stop reason: HOSPADM

## 2022-02-21 RX ORDER — FLUTICASONE PROPIONATE 50 MCG
2 SPRAY, SUSPENSION (ML) NASAL DAILY
Status: DISCONTINUED | OUTPATIENT
Start: 2022-02-22 | End: 2022-03-01 | Stop reason: HOSPADM

## 2022-02-21 RX ORDER — MONTELUKAST SODIUM 10 MG/1
10 TABLET ORAL NIGHTLY
Status: DISCONTINUED | OUTPATIENT
Start: 2022-02-22 | End: 2022-03-01 | Stop reason: HOSPADM

## 2022-02-21 RX ORDER — NITROGLYCERIN 0.4 MG/1
0.4 TABLET SUBLINGUAL EVERY 5 MIN PRN
Status: DISCONTINUED | OUTPATIENT
Start: 2022-02-22 | End: 2022-03-01 | Stop reason: HOSPADM

## 2022-02-21 RX ORDER — IPRATROPIUM BROMIDE AND ALBUTEROL SULFATE 2.5; .5 MG/3ML; MG/3ML
3 SOLUTION RESPIRATORY (INHALATION)
Status: COMPLETED | OUTPATIENT
Start: 2022-02-21 | End: 2022-02-21

## 2022-02-21 RX ORDER — PRAVASTATIN SODIUM 40 MG/1
40 TABLET ORAL DAILY
Status: DISCONTINUED | OUTPATIENT
Start: 2022-02-22 | End: 2022-03-01 | Stop reason: HOSPADM

## 2022-02-21 RX ORDER — ASCORBIC ACID 250 MG
250 TABLET ORAL DAILY
Status: DISCONTINUED | OUTPATIENT
Start: 2022-02-22 | End: 2022-03-01 | Stop reason: HOSPADM

## 2022-02-21 RX ORDER — CETIRIZINE HYDROCHLORIDE 5 MG/1
10 TABLET ORAL DAILY
Refills: 11 | Status: DISCONTINUED | OUTPATIENT
Start: 2022-02-22 | End: 2022-03-01 | Stop reason: HOSPADM

## 2022-02-21 RX ADMIN — FUROSEMIDE 40 MG: 10 INJECTION, SOLUTION INTRAMUSCULAR; INTRAVENOUS at 10:02

## 2022-02-21 RX ADMIN — IPRATROPIUM BROMIDE AND ALBUTEROL SULFATE 3 ML: 2.5; .5 SOLUTION RESPIRATORY (INHALATION) at 10:02

## 2022-02-21 NOTE — TELEPHONE ENCOUNTER
----- Message from Elidia Madison NP sent at 2/14/2022  9:46 AM CST -----  Glucose. [place] from PAP?

## 2022-02-21 NOTE — TELEPHONE ENCOUNTER
Called to discuss and review glucose.     Fastin-167  Evenin-224     Current Regimen:  Lantus 20units DAILY     Hypoglycemia: resolved  Hyperglycemia: Evening     Recommended Medication Changes:  Lantus 22units DAILY     Recommended restarting DPP4 (in place of increasing insulin) but patient reports it is getting too expensive. Will put in referral for PAP.      Instructed to send glucose logs in 7 days.  Reach out to me sooner for any glucose <70 or consistently >200.     Medication list updated.  Findings and recommendations reviewed with the patient.  All questions answered.

## 2022-02-22 DIAGNOSIS — I25.5 CARDIOMYOPATHY, ISCHEMIC: Primary | ICD-10-CM

## 2022-02-22 DIAGNOSIS — J96.01 ACUTE HYPOXEMIC RESPIRATORY FAILURE: ICD-10-CM

## 2022-02-22 PROBLEM — J44.1 CHRONIC OBSTRUCTIVE PULMONARY DISEASE WITH ACUTE EXACERBATION: Status: ACTIVE | Noted: 2018-04-04

## 2022-02-22 LAB
ALLENS TEST: ABNORMAL
ASCENDING AORTA: 2.74 CM
AV INDEX (PROSTH): 0.57
AV MEAN GRADIENT: 5 MMHG
AV PEAK GRADIENT: 8 MMHG
AV VALVE AREA: 1.77 CM2
AV VELOCITY RATIO: 0.6
BASOPHILS # BLD AUTO: 0.06 K/UL (ref 0–0.2)
BASOPHILS NFR BLD: 0.6 % (ref 0–1.9)
BSA FOR ECHO PROCEDURE: 1.64 M2
CV ECHO LV RWT: 0.31 CM
DELSYS: ABNORMAL
DIFFERENTIAL METHOD: ABNORMAL
DOP CALC AO PEAK VEL: 1.42 M/S
DOP CALC AO VTI: 29.47 CM
DOP CALC LVOT AREA: 3.1 CM2
DOP CALC LVOT DIAMETER: 1.99 CM
DOP CALC LVOT PEAK VEL: 0.85 M/S
DOP CALC LVOT STROKE VOLUME: 52.1 CM3
DOP CALCLVOT PEAK VEL VTI: 16.76 CM
E WAVE DECELERATION TIME: 247.82 MSEC
E/A RATIO: 1.08
E/E' RATIO: 15 M/S
ECHO LV POSTERIOR WALL: 0.7 CM (ref 0.6–1.1)
EJECTION FRACTION: 45 %
EOSINOPHIL # BLD AUTO: 0.3 K/UL (ref 0–0.5)
EOSINOPHIL NFR BLD: 2.9 % (ref 0–8)
ERYTHROCYTE [DISTWIDTH] IN BLOOD BY AUTOMATED COUNT: 13.3 % (ref 11.5–14.5)
ESTIMATED AVG GLUCOSE: 123 MG/DL (ref 68–131)
FLOW: 2
FRACTIONAL SHORTENING: 20 % (ref 28–44)
HBA1C MFR BLD: 5.9 % (ref 4–5.6)
HCO3 UR-SCNC: 28.3 MMOL/L (ref 24–28)
HCT VFR BLD AUTO: 34.5 % (ref 37–48.5)
HGB BLD-MCNC: 10.4 G/DL (ref 12–16)
IMM GRANULOCYTES # BLD AUTO: 0.05 K/UL (ref 0–0.04)
IMM GRANULOCYTES NFR BLD AUTO: 0.5 % (ref 0–0.5)
INTERVENTRICULAR SEPTUM: 0.9 CM (ref 0.6–1.1)
LA MAJOR: 6.11 CM
LA MINOR: 5.88 CM
LA WIDTH: 4.51 CM
LACTATE SERPL-SCNC: 0.6 MMOL/L (ref 0.5–2.2)
LEFT ATRIUM SIZE: 3.75 CM
LEFT ATRIUM VOLUME INDEX: 52.9 ML/M2
LEFT ATRIUM VOLUME: 86.15 CM3
LEFT INTERNAL DIMENSION IN SYSTOLE: 3.58 CM (ref 2.1–4)
LEFT VENTRICLE DIASTOLIC VOLUME INDEX: 56.8 ML/M2
LEFT VENTRICLE DIASTOLIC VOLUME: 92.58 ML
LEFT VENTRICLE MASS INDEX: 70 G/M2
LEFT VENTRICLE SYSTOLIC VOLUME INDEX: 33 ML/M2
LEFT VENTRICLE SYSTOLIC VOLUME: 53.74 ML
LEFT VENTRICULAR INTERNAL DIMENSION IN DIASTOLE: 4.5 CM (ref 3.5–6)
LEFT VENTRICULAR MASS: 113.63 G
LV LATERAL E/E' RATIO: 15 M/S
LV SEPTAL E/E' RATIO: 15 M/S
LYMPHOCYTES # BLD AUTO: 1.2 K/UL (ref 1–4.8)
LYMPHOCYTES NFR BLD: 11.9 % (ref 18–48)
MAGNESIUM SERPL-MCNC: 1.9 MG/DL (ref 1.6–2.6)
MCH RBC QN AUTO: 28.4 PG (ref 27–31)
MCHC RBC AUTO-ENTMCNC: 30.1 G/DL (ref 32–36)
MCV RBC AUTO: 94 FL (ref 82–98)
MODE: ABNORMAL
MONOCYTES # BLD AUTO: 0.6 K/UL (ref 0.3–1)
MONOCYTES NFR BLD: 6.4 % (ref 4–15)
MV PEAK A VEL: 1.11 M/S
MV PEAK E VEL: 1.2 M/S
MV STENOSIS PRESSURE HALF TIME: 71.87 MS
MV VALVE AREA P 1/2 METHOD: 3.06 CM2
NEUTROPHILS # BLD AUTO: 7.6 K/UL (ref 1.8–7.7)
NEUTROPHILS NFR BLD: 77.7 % (ref 38–73)
NRBC BLD-RTO: 0 /100 WBC
PCO2 BLDA: 46 MMHG (ref 35–45)
PH SMN: 7.4 [PH] (ref 7.35–7.45)
PISA TR MAX VEL: 3.27 M/S
PLATELET # BLD AUTO: 250 K/UL (ref 150–450)
PMV BLD AUTO: 10.5 FL (ref 9.2–12.9)
PO2 BLDA: 43 MMHG (ref 40–60)
POC BE: 3 MMOL/L
POC SATURATED O2: 78 % (ref 95–100)
POC TCO2: 30 MMOL/L (ref 24–29)
POCT GLUCOSE: 130 MG/DL (ref 70–110)
POCT GLUCOSE: 177 MG/DL (ref 70–110)
POCT GLUCOSE: 185 MG/DL (ref 70–110)
PROCALCITONIN SERPL IA-MCNC: 0.04 NG/ML
RA MAJOR: 4.69 CM
RA PRESSURE: 3 MMHG
RA WIDTH: 4.1 CM
RBC # BLD AUTO: 3.66 M/UL (ref 4–5.4)
RIGHT VENTRICULAR END-DIASTOLIC DIMENSION: 3.31 CM
SAMPLE: ABNORMAL
SINUS: 2.59 CM
SITE: ABNORMAL
STJ: 2.74 CM
TDI LATERAL: 0.08 M/S
TDI SEPTAL: 0.08 M/S
TDI: 0.08 M/S
TR MAX PG: 43 MMHG
TRICUSPID ANNULAR PLANE SYSTOLIC EXCURSION: 2.05 CM
TV REST PULMONARY ARTERY PRESSURE: 46 MMHG
WBC # BLD AUTO: 9.77 K/UL (ref 3.9–12.7)

## 2022-02-22 PROCEDURE — 94761 N-INVAS EAR/PLS OXIMETRY MLT: CPT

## 2022-02-22 PROCEDURE — 83036 HEMOGLOBIN GLYCOSYLATED A1C: CPT | Performed by: NURSE PRACTITIONER

## 2022-02-22 PROCEDURE — 63600175 PHARM REV CODE 636 W HCPCS: Performed by: NURSE PRACTITIONER

## 2022-02-22 PROCEDURE — 83735 ASSAY OF MAGNESIUM: CPT | Performed by: NURSE PRACTITIONER

## 2022-02-22 PROCEDURE — 25000003 PHARM REV CODE 250: Performed by: PHYSICIAN ASSISTANT

## 2022-02-22 PROCEDURE — 63700000 PHARM REV CODE 250 ALT 637 W/O HCPCS: Performed by: PHYSICIAN ASSISTANT

## 2022-02-22 PROCEDURE — 25000242 PHARM REV CODE 250 ALT 637 W/ HCPCS: Performed by: PHYSICIAN ASSISTANT

## 2022-02-22 PROCEDURE — 25000003 PHARM REV CODE 250: Performed by: NURSE PRACTITIONER

## 2022-02-22 PROCEDURE — 99226 PR SUBSEQUENT OBSERVATION CARE,LEVEL III: ICD-10-PCS | Mod: ,,, | Performed by: PHYSICIAN ASSISTANT

## 2022-02-22 PROCEDURE — 63600175 PHARM REV CODE 636 W HCPCS: Performed by: PHYSICIAN ASSISTANT

## 2022-02-22 PROCEDURE — 36415 COLL VENOUS BLD VENIPUNCTURE: CPT | Performed by: NURSE PRACTITIONER

## 2022-02-22 PROCEDURE — 84145 PROCALCITONIN (PCT): CPT | Performed by: NURSE PRACTITIONER

## 2022-02-22 PROCEDURE — 94660 CPAP INITIATION&MGMT: CPT

## 2022-02-22 PROCEDURE — 97535 SELF CARE MNGMENT TRAINING: CPT

## 2022-02-22 PROCEDURE — 96365 THER/PROPH/DIAG IV INF INIT: CPT

## 2022-02-22 PROCEDURE — 97161 PT EVAL LOW COMPLEX 20 MIN: CPT

## 2022-02-22 PROCEDURE — 27000221 HC OXYGEN, UP TO 24 HOURS

## 2022-02-22 PROCEDURE — 99226 PR SUBSEQUENT OBSERVATION CARE,LEVEL III: CPT | Mod: ,,, | Performed by: PHYSICIAN ASSISTANT

## 2022-02-22 PROCEDURE — 96372 THER/PROPH/DIAG INJ SC/IM: CPT | Performed by: NURSE PRACTITIONER

## 2022-02-22 PROCEDURE — 94640 AIRWAY INHALATION TREATMENT: CPT

## 2022-02-22 PROCEDURE — 27000190 HC CPAP FULL FACE MASK W/VALVE

## 2022-02-22 PROCEDURE — 20600001 HC STEP DOWN PRIVATE ROOM

## 2022-02-22 PROCEDURE — 25000242 PHARM REV CODE 250 ALT 637 W/ HCPCS: Performed by: NURSE PRACTITIONER

## 2022-02-22 PROCEDURE — 97116 GAIT TRAINING THERAPY: CPT

## 2022-02-22 PROCEDURE — 99900035 HC TECH TIME PER 15 MIN (STAT)

## 2022-02-22 PROCEDURE — 97165 OT EVAL LOW COMPLEX 30 MIN: CPT

## 2022-02-22 PROCEDURE — C9399 UNCLASSIFIED DRUGS OR BIOLOG: HCPCS | Performed by: NURSE PRACTITIONER

## 2022-02-22 PROCEDURE — 85025 COMPLETE CBC W/AUTO DIFF WBC: CPT | Performed by: NURSE PRACTITIONER

## 2022-02-22 PROCEDURE — 83605 ASSAY OF LACTIC ACID: CPT | Performed by: NURSE PRACTITIONER

## 2022-02-22 RX ORDER — GLUCAGON 1 MG
1 KIT INJECTION
Status: DISCONTINUED | OUTPATIENT
Start: 2022-02-22 | End: 2022-03-01 | Stop reason: HOSPADM

## 2022-02-22 RX ORDER — LOSARTAN POTASSIUM 50 MG/1
100 TABLET ORAL DAILY
Refills: 3 | Status: DISCONTINUED | OUTPATIENT
Start: 2022-02-22 | End: 2022-02-23

## 2022-02-22 RX ORDER — DOXYCYCLINE HYCLATE 100 MG
100 TABLET ORAL EVERY 12 HOURS
Status: DISCONTINUED | OUTPATIENT
Start: 2022-02-22 | End: 2022-02-22

## 2022-02-22 RX ORDER — BENZONATATE 100 MG/1
100 CAPSULE ORAL 3 TIMES DAILY PRN
Status: DISCONTINUED | OUTPATIENT
Start: 2022-02-22 | End: 2022-03-01 | Stop reason: HOSPADM

## 2022-02-22 RX ORDER — IPRATROPIUM BROMIDE AND ALBUTEROL SULFATE 2.5; .5 MG/3ML; MG/3ML
3 SOLUTION RESPIRATORY (INHALATION)
Status: DISCONTINUED | OUTPATIENT
Start: 2022-02-22 | End: 2022-02-25

## 2022-02-22 RX ORDER — DILTIAZEM HYDROCHLORIDE 120 MG/1
240 CAPSULE, COATED, EXTENDED RELEASE ORAL DAILY
Status: DISCONTINUED | OUTPATIENT
Start: 2022-02-22 | End: 2022-03-01 | Stop reason: HOSPADM

## 2022-02-22 RX ORDER — SODIUM CHLORIDE 0.9 % (FLUSH) 0.9 %
10 SYRINGE (ML) INJECTION
Status: DISCONTINUED | OUTPATIENT
Start: 2022-02-22 | End: 2022-03-01 | Stop reason: HOSPADM

## 2022-02-22 RX ORDER — INSULIN ASPART 100 [IU]/ML
0-5 INJECTION, SOLUTION INTRAVENOUS; SUBCUTANEOUS
Status: DISCONTINUED | OUTPATIENT
Start: 2022-02-22 | End: 2022-02-22

## 2022-02-22 RX ORDER — ACETAMINOPHEN 325 MG/1
650 TABLET ORAL EVERY 6 HOURS PRN
Status: DISCONTINUED | OUTPATIENT
Start: 2022-02-22 | End: 2022-03-01 | Stop reason: HOSPADM

## 2022-02-22 RX ORDER — HYDRALAZINE HYDROCHLORIDE 25 MG/1
75 TABLET, FILM COATED ORAL ONCE
Status: COMPLETED | OUTPATIENT
Start: 2022-02-22 | End: 2022-02-22

## 2022-02-22 RX ORDER — CLONIDINE 0.1 MG/24H
1 PATCH, EXTENDED RELEASE TRANSDERMAL
Status: DISCONTINUED | OUTPATIENT
Start: 2022-02-22 | End: 2022-03-01 | Stop reason: HOSPADM

## 2022-02-22 RX ORDER — HEPARIN SODIUM 5000 [USP'U]/ML
5000 INJECTION, SOLUTION INTRAVENOUS; SUBCUTANEOUS EVERY 8 HOURS
Status: DISCONTINUED | OUTPATIENT
Start: 2022-02-22 | End: 2022-03-01 | Stop reason: HOSPADM

## 2022-02-22 RX ORDER — AZITHROMYCIN 250 MG/1
250 TABLET, FILM COATED ORAL DAILY
Status: DISCONTINUED | OUTPATIENT
Start: 2022-02-23 | End: 2022-02-24

## 2022-02-22 RX ORDER — AZITHROMYCIN 250 MG/1
500 TABLET, FILM COATED ORAL ONCE
Status: COMPLETED | OUTPATIENT
Start: 2022-02-22 | End: 2022-02-22

## 2022-02-22 RX ORDER — IBUPROFEN 200 MG
24 TABLET ORAL
Status: DISCONTINUED | OUTPATIENT
Start: 2022-02-22 | End: 2022-03-01 | Stop reason: HOSPADM

## 2022-02-22 RX ORDER — FUROSEMIDE 10 MG/ML
20 INJECTION INTRAMUSCULAR; INTRAVENOUS
Status: DISCONTINUED | OUTPATIENT
Start: 2022-02-22 | End: 2022-02-23

## 2022-02-22 RX ORDER — PREDNISONE 20 MG/1
40 TABLET ORAL DAILY
Status: COMPLETED | OUTPATIENT
Start: 2022-02-22 | End: 2022-02-26

## 2022-02-22 RX ORDER — IBUPROFEN 200 MG
16 TABLET ORAL
Status: DISCONTINUED | OUTPATIENT
Start: 2022-02-22 | End: 2022-03-01 | Stop reason: HOSPADM

## 2022-02-22 RX ADMIN — AZITHROMYCIN MONOHYDRATE 500 MG: 250 TABLET ORAL at 03:02

## 2022-02-22 RX ADMIN — HEPARIN SODIUM 5000 UNITS: 5000 INJECTION INTRAVENOUS; SUBCUTANEOUS at 09:02

## 2022-02-22 RX ADMIN — PREDNISONE 40 MG: 20 TABLET ORAL at 03:02

## 2022-02-22 RX ADMIN — HYDRALAZINE HYDROCHLORIDE 25 MG: 25 TABLET, FILM COATED ORAL at 12:02

## 2022-02-22 RX ADMIN — PRAVASTATIN SODIUM 40 MG: 40 TABLET ORAL at 08:02

## 2022-02-22 RX ADMIN — IPRATROPIUM BROMIDE AND ALBUTEROL SULFATE 3 ML: 2.5; .5 SOLUTION RESPIRATORY (INHALATION) at 07:02

## 2022-02-22 RX ADMIN — DOXYCYCLINE HYCLATE 100 MG: 100 TABLET, COATED ORAL at 08:02

## 2022-02-22 RX ADMIN — FUROSEMIDE 20 MG: 10 INJECTION, SOLUTION INTRAMUSCULAR; INTRAVENOUS at 08:02

## 2022-02-22 RX ADMIN — HYDRALAZINE HYDROCHLORIDE 100 MG: 25 TABLET, FILM COATED ORAL at 08:02

## 2022-02-22 RX ADMIN — IPRATROPIUM BROMIDE AND ALBUTEROL SULFATE 3 ML: 2.5; .5 SOLUTION RESPIRATORY (INHALATION) at 03:02

## 2022-02-22 RX ADMIN — IPRATROPIUM BROMIDE AND ALBUTEROL SULFATE 3 ML: 2.5; .5 SOLUTION RESPIRATORY (INHALATION) at 12:02

## 2022-02-22 RX ADMIN — CLONIDINE 1 PATCH: 0.1 PATCH TRANSDERMAL at 08:02

## 2022-02-22 RX ADMIN — INSULIN DETEMIR 18 UNITS: 100 INJECTION, SOLUTION SUBCUTANEOUS at 08:02

## 2022-02-22 RX ADMIN — CARVEDILOL 25 MG: 25 TABLET, FILM COATED ORAL at 12:02

## 2022-02-22 RX ADMIN — HYDRALAZINE HYDROCHLORIDE 75 MG: 25 TABLET, FILM COATED ORAL at 12:02

## 2022-02-22 RX ADMIN — ACETAMINOPHEN 650 MG: 325 TABLET ORAL at 08:02

## 2022-02-22 RX ADMIN — Medication 250 MG: at 08:02

## 2022-02-22 RX ADMIN — MONTELUKAST 10 MG: 10 TABLET, FILM COATED ORAL at 12:02

## 2022-02-22 RX ADMIN — FLUTICASONE FUROATE AND VILANTEROL TRIFENATATE 1 PUFF: 100; 25 POWDER RESPIRATORY (INHALATION) at 08:02

## 2022-02-22 RX ADMIN — CARVEDILOL 25 MG: 25 TABLET, FILM COATED ORAL at 08:02

## 2022-02-22 RX ADMIN — GUAIFENESIN AND DEXTROMETHORPHAN HYDROBROMIDE 1 TABLET: 600; 30 TABLET, EXTENDED RELEASE ORAL at 03:02

## 2022-02-22 RX ADMIN — HEPARIN SODIUM 5000 UNITS: 5000 INJECTION INTRAVENOUS; SUBCUTANEOUS at 05:02

## 2022-02-22 RX ADMIN — CEFTRIAXONE 1 G: 1 INJECTION, SOLUTION INTRAVENOUS at 02:02

## 2022-02-22 RX ADMIN — HYDRALAZINE HYDROCHLORIDE 100 MG: 25 TABLET, FILM COATED ORAL at 03:02

## 2022-02-22 RX ADMIN — MONTELUKAST 10 MG: 10 TABLET, FILM COATED ORAL at 08:02

## 2022-02-22 RX ADMIN — DILTIAZEM HYDROCHLORIDE 240 MG: 120 CAPSULE, COATED, EXTENDED RELEASE ORAL at 08:02

## 2022-02-22 RX ADMIN — LOSARTAN POTASSIUM 100 MG: 50 TABLET, FILM COATED ORAL at 08:02

## 2022-02-22 RX ADMIN — CHOLECALCIFEROL TAB 25 MCG (1000 UNIT) 2000 UNITS: 25 TAB at 08:02

## 2022-02-22 RX ADMIN — CETIRIZINE HYDROCHLORIDE 10 MG: 5 TABLET ORAL at 08:02

## 2022-02-22 RX ADMIN — HEPARIN SODIUM 5000 UNITS: 5000 INJECTION INTRAVENOUS; SUBCUTANEOUS at 03:02

## 2022-02-22 RX ADMIN — GUAIFENESIN AND DEXTROMETHORPHAN HYDROBROMIDE 1 TABLET: 600; 30 TABLET, EXTENDED RELEASE ORAL at 08:02

## 2022-02-22 NOTE — ASSESSMENT & PLAN NOTE
Patient's FSGs are controlled on current hypoglycemics.   Last A1c reviewed-   Lab Results   Component Value Date    HGBA1C 5.9 (H) 02/22/2022     Most recent fingerstick glucose reviewed- No results for input(s): POCTGLUCOSE in the last 24 hours.  Current correctional scale  Low  Maintain anti-hyperglycemic dose as follows-   Antihyperglycemics (From admission, onward)            Start     Stop Route Frequency Ordered    02/22/22 0900  insulin detemir U-100 pen 18 Units         -- SubQ Daily 02/22/22 0323      Accuchecks AC/HS  Diabetic/cardiac diet

## 2022-02-22 NOTE — ASSESSMENT & PLAN NOTE
-Initially hypertensive, improved with home medications.  -Continue coreg 25mg BID, cardizem 240mg daily, hydralazine 100mg TID, ibersartan 300mg qhs.  - cardiac diet

## 2022-02-22 NOTE — PLAN OF CARE
Problem: Occupational Therapy Goal  Goal: Occupational Therapy Goal  Description: Goals to be met by: 3/8/2022     Patient will increase functional independence with ADLs by performing:    UE Dressing with [place].  LE Dressing with Modified [place].  Grooming while standing at sink with Modified [place].  Toileting from toilet with Modified [place] for hygiene and clothing management.   Supine to sit with Modified [place].  Sit to stand transfer with Modified [place] with or without AD.  Toilet transfer to toilet with Modified [place] with or without AD.    Outcome: Ongoing, Progressing     Pt evaluated and OT goals established.

## 2022-02-22 NOTE — PLAN OF CARE
Problem: Physical Therapy Goal  Goal: Physical Therapy Goal  Description: Goals to be met by:  3/8/22    Patient will increase functional independence with mobility by performin. Supine to sit with [place]  2. Sit to supine with [place]  3. Sit to stand transfer with Supervision  4. Gait  x 50 feet with Contact Guard Assistance using Rolling Walker.   5. Lower extremity exercise program x 20 reps per handout, with independence      Outcome: Ongoing, Progressing   Pt progressing, appropriate goals established

## 2022-02-22 NOTE — HPI
Myesha Quinones is a 81 y.o. female with a PMHx of CAD, ischemic cardiomyopathy with LBBB s/p biventricular ICD, CHF, CKD4, IDDM, breast cancer, HTN, asthma, COPD, and HLD who presents to the ED with complaints of shortness of breath. The patient reports shortness of breath beginning about 12 days ago. She reports her shortness of breath is worse with exertion. She was seen in cardiology clinic and started on lasix 20mg daily and reports weight loss of 5lbs and significant improvement of BLE edema. However, her dyspnea has been worsening. She reports a chronic cough that has become productive over the last week, noting green sputum. Endorses associated fatigue and generalized weakness. Denies orthopnea, PND, chest pain, palpitations, fever, chills, nausea, vomiting, diarrhea, or dysuria.    In the ED, patient found to be hypoxic requiring 2L O2 via nasal cannula. Patient also hypertensive and mildly tachycardic, afebrile. CBC unremarkable. CMP with baseline CKD. , improved from previous. Troponin WNL. Lactate and procal in process. UA negative for infection. CXR Stable right pleural fluid. Stable bilateral reticular opacities suggestive of pulmonary edema or infection. EKG with ventricularly paced rhythm, rate 84. The patient received oral hydralazine, duo neb treatment, and IV lasix in the ED.

## 2022-02-22 NOTE — ASSESSMENT & PLAN NOTE
Worsening SOB with productive cough with green sputum  - Afebrile without leukocytosis.   -Continue supplemental oxygen as needed, currently on 1L NC  - started azithromycin X 5 days, prednisone 40 mg daily X 5 days  - scheduled duonebs [id number]  - started mucinex-DM BID, tessalon perles TID PRN cough  -Continue daily inhalers  - monitor for hyperglycemia while on steroids

## 2022-02-22 NOTE — ASSESSMENT & PLAN NOTE
Nonischemic cardiomyopathy  Patient is identified as having Combined Systolic and Diastolic heart failure that is Acute on Chronic. CHF is currently uncontrolled due to volume overload due to: Continued edema of extremities and JVD, Rales/crackles on pulmonary exam and Pulmonary edema/pleural effusion on CXR. Latest ECHO performed and demonstrates- Results for orders placed during the hospital encounter of 03/23/21    Echo Color Flow Doppler? Yes    Interpretation Summary  · The left ventricle is normal in size with eccentric hypertrophy and low normal systolic function. The estimated ejection fraction is 50%  · There are segmental left ventricular wall motion abnormalities.  · Normal left ventricular diastolic function.  · Normal right ventricular size with normal right ventricular systolic function.  · Mild tricuspid regurgitation.  · The estimated PA systolic pressure is 44 mmHg.  · There is pulmonary hypertension.  · Normal central venous pressure (3 mmHg).  . Continue Beta Blocker ACE/ARB Furosemide Nitrate/Vasodilator and monitor clinical status closely. Monitor on telemetry. Patient is on CHF pathway.  Monitor strict Is&Os and daily weights.  Place on fluid restriction of 1.5 L. Continue to stress to patient importance of self efficacy and  on diet for CHF. Last BNP reviewed- and noted below   Recent Labs   Lab 02/21/22 1935   *   -ECHO ordered

## 2022-02-22 NOTE — PROGRESS NOTES
Nagi Aggarwal - Oncology ([facility])  [facility] Medicine  Progress Note    Patient Name: Myesha Quinones  MRN: [id number]  Patient Class: OP- Observation   Admission Date: 2/21/2022  Length of Stay: 0 days  Attending Physician: Jose Manuel Correia MD  Primary Care Provider: Catrachita Rothman MD        Subjective:     Principal Problem:Acute on chronic combined systolic and diastolic heart failure        HPI:  Myesha Quinones is a 81 y.o. female with a PMHx of CAD, ischemic cardiomyopathy with LBBB s/p biventricular ICD, CHF, CKD4, IDDM, breast cancer, HTN, asthma, COPD, and HLD who presents to the ED with complaints of shortness of breath. The patient reports shortness of breath beginning about 12 days ago. She reports her shortness of breath is worse with exertion. She was seen in cardiology clinic and started on lasix 20mg daily and reports weight loss of 5lbs and significant improvement of BLE edema. However, her dyspnea has been worsening. She reports a chronic cough that has become productive over the last week, noting green sputum. Endorses associated fatigue and generalized weakness. Denies orthopnea, PND, chest pain, palpitations, fever, chills, nausea, vomiting, diarrhea, or dysuria.    In the ED, patient found to be hypoxic requiring 2L O2 via nasal cannula. Patient also hypertensive and mildly tachycardic, afebrile. CBC unremarkable. CMP with baseline CKD. , improved from previous. Troponin WNL. Lactate and procal in process. UA negative for infection. CXR Stable right pleural fluid. Stable bilateral reticular opacities suggestive of pulmonary edema or infection. EKG with ventricularly paced rhythm, rate 84. The patient received oral hydralazine, duo neb treatment, and IV lasix in the ED.      Overview/Hospital Course:  Mrs. Quinones was admitted to observation for CHF and COPD exacerbation. Afebrile without leukocytosis. Started on Lasix IV 20 mg BIDand scheduled duonebs, prednisone, and  azithromycin.      Interval History: Patient seen at bedside with son. Reports improvement in lower extremity and shortness of breath. Still with significant wheezing. Weaned from 2L to 1L NC. Started duonebs, prednisone, azithro for COPD.     Review of Systems   Constitutional:  Positive for fatigue. Negative for chills and fever.   HENT:  Negative for congestion and rhinorrhea.    Respiratory:  Positive for cough, shortness of breath and wheezing. Negative for chest tightness.    Cardiovascular:  Positive for leg swelling. Negative for chest pain and palpitations.   Gastrointestinal:  Negative for abdominal distention, abdominal pain, diarrhea and nausea.   Genitourinary:  Negative for difficulty urinating, dysuria, flank pain and hematuria.   Musculoskeletal:  Negative for arthralgias and back pain.   Neurological:  Negative for dizziness, numbness and headaches.   Psychiatric/Behavioral:  Negative for agitation and behavioral problems.    Objective:     Vital Signs (Most Recent):  Temp: 96.4 °F (35.8 °C) (02/22/22 1130)  Pulse: 81 (02/22/22 1223)  Resp: 18 (02/22/22 1223)  BP: (!) 161/71 (02/22/22 1130)  SpO2: 97 % (02/22/22 1223)   Vital Signs (24h Range):  Temp:  [96.4 °F (35.8 °C)-98.4 °F (36.9 °C)] 96.4 °F (35.8 °C)  Pulse:  [77-98] 81  Resp:  [18-25] 18  SpO2:  [89 %-98 %] 97 %  BP: (161-219)/(71-97) 161/71     Weight: 60.3 kg (133 lb)  Body mass index is 23.56 kg/m².    Intake/Output Summary (Last 24 hours) at 2/22/2022 1309  Last data filed at 2/22/2022 0230  Gross per 24 hour   Intake --   Output 2350 ml   Net -2350 ml      Physical Exam  Vitals and nursing note reviewed.   Constitutional:       General: She is not in acute distress.     Appearance: Normal appearance. She is not ill-appearing.   HENT:      Head: Normocephalic and atraumatic.      Nose: Nose normal. No congestion.      Mouth/Throat:      Mouth: Mucous membranes are moist.      Pharynx: Oropharynx is clear.   Eyes:      Extraocular  Movements: Extraocular movements intact.      Conjunctiva/sclera: Conjunctivae normal.      Pupils: Pupils are equal, round, and reactive to light.   Cardiovascular:      Rate and Rhythm: Normal rate and regular rhythm.      Pulses: Normal pulses.      Heart sounds: Normal heart sounds. No murmur heard.  Pulmonary:      Breath sounds: Wheezing (inspiratory and expiratory throughout) and rales (faint bibasilar) present.      Comments: 1L NC  Chest:      Chest wall: No tenderness.   Abdominal:      General: Abdomen is flat. Bowel sounds are normal. There is no distension.      Palpations: Abdomen is soft.      Tenderness: There is no abdominal tenderness.   Musculoskeletal:         General: Normal range of motion.      Right lower leg: Edema (1+) present.      Left lower leg: Edema (2+) present.      Comments: Chronic RUE swelling from prior mastectomy    Skin:     General: Skin is warm and dry.      Capillary Refill: Capillary refill takes less than 2 seconds.   Neurological:      General: No focal deficit present.      Mental Status: She is alert and oriented to person, place, and time. Mental status is at baseline.   Psychiatric:         Mood and Affect: Mood normal.         Behavior: Behavior normal.       Significant Labs: All pertinent labs within the past 24 hours have been reviewed.  CBC:   Recent Labs   Lab 02/21/22 1935 02/22/22  0519   WBC 8.89 9.77   HGB 10.9* 10.4*   HCT 35.4* 34.5*    250     CMP:   Recent Labs   Lab 02/21/22 1935      K 4.2      CO2 22*   *   BUN 44*   CREATININE 2.3*   CALCIUM 9.8   PROT 8.1   ALBUMIN 3.5   BILITOT 0.5   ALKPHOS 137*   AST 22   ALT 17   ANIONGAP 15   EGFRNONAA 19.2*     Magnesium:   Recent Labs   Lab 02/21/22 1935 02/22/22  0519   MG 2.0 1.9       Significant Imaging: I have reviewed all pertinent imaging results/findings within the past 24 hours.      Assessment/Plan:      * Acute on chronic combined systolic and diastolic heart  failure  Nonischemic cardiomyopathy  Patient is identified as having Combined Systolic and Diastolic heart failure that is Acute on Chronic. CHF is currently uncontrolled due to volume overload due to: Continued edema of extremities and JVD, Rales/crackles on pulmonary exam and Pulmonary edema/pleural effusion on CXR. Latest ECHO performed and demonstrates- Results for orders placed during the hospital encounter of 03/23/21    Echo Color Flow Doppler? Yes    Interpretation Summary  · The left ventricle is normal in size with eccentric hypertrophy and low normal systolic function. The estimated ejection fraction is 50%  · There are segmental left ventricular wall motion abnormalities.  · Normal left ventricular diastolic function.  · Normal right ventricular size with normal right ventricular systolic function.  · Mild tricuspid regurgitation.  · The estimated PA systolic pressure is 44 mmHg.  · There is pulmonary hypertension.  · Normal central venous pressure (3 mmHg).  . Continue Beta Blocker ACE/ARB Furosemide Nitrate/Vasodilator and monitor clinical status closely. Monitor on telemetry. Patient is on CHF pathway.  Monitor strict Is&Os and daily weights.  Place on fluid restriction of 1.5 L. Continue to stress to patient importance of self efficacy and  on diet for CHF. 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   Last A1c reviewed-   Lab Results   Component Value Date    HGBA1C 5.9 (H) 02/22/2022     Most recent fingerstick glucose reviewed-   Recent Labs   Lab 02/22/22  0834 02/22/22  1238   POCTGLUCOSE 130* 177*     Current correctional  scale  Low  Maintain anti-hyperglycemic dose as follows-   Antihyperglycemics (From admission, onward)            Start     Stop Route Frequency Ordered    02/22/22 0900  insulin detemir U-100 pen 18 Units         -- SubQ Daily 02/22/22 0323      Accuchecks AC/HS  Diabetic/cardiac diet  Monitor closely while on prednisone    Hyperlipidemia associated with type 2 diabetes mellitus   Patient is chronically on statin.will continue for now. Monitor clinically. Last LDL was   Lab Results   Component Value Date    LDLCALC 43.8 (L) 02/08/2022       CKD (chronic kidney disease) stage 4, GFR 15-29 ml/min  Creatine stable for now. BMP reviewed- noted Estimated Creatinine Clearance: 15.6 mL/min (A) (based on SCr of 2.3 mg/dL (H)). according to latest data. Monitor UOP and serial BMP and adjust therapy as needed. Renally dose meds.    Acute hypoxemic respiratory failure  Patient with acute hypoxemic respiratory failure likely secondary to CHF exacerbation + COPD exacerbation. Patient reporting worsening cough for the last week with green sputum. CXR with stable right pleural fluid. Stable bilateral reticular opacities suggestive of pulmonary edema or infection.    -Procalcitonin and lactic WNL. Do not suspect PNA  -CTX/ doxy switched to azithromycin for COPD exacerbation + prednisone, scheduled breathing treatments  - continue diuresis  - wean oxygen as tolerated, currently on 1L NC    Chronic obstructive pulmonary disease with acute exacerbation  Worsening SOB with productive cough with green sputum  - Afebrile without leukocytosis.   -Continue supplemental oxygen as needed, currently on 1L NC  - started azithromycin X 5 days, prednisone 40 mg daily X 5 days  - scheduled duonebs [id number]  - started mucinex-DM BID, tessalon perles TID PRN cough  -Continue daily inhalers  - monitor for hyperglycemia while on steroids    Iron deficiency anemia  Patient's anemia is currently controlled.   Current CBC reviewed-   Lab Results    Component Value Date    HGB 10.9 (L) 02/21/2022    HCT 35.4 (L) 02/21/2022     Monitor serial CBC and transfuse if patient becomes hemodynamically unstable, symptomatic or H/H drops below 7/21.     Hypertension associated with diabetes  -Initially hypertensive, improved with home medications.  -Continue coreg 25mg BID, cardizem 240mg daily, hydralazine 100mg TID, ibersartan 300mg qhs.  - cardiac diet    Biventricular ICD (implantable cardioverter-defibrillator) in place  Seen on CXR  - stable    Asthma, chronic  -Continue montelukast  -PRN duo nebs      VTE Risk Mitigation (From admission, onward)         Ordered     heparin (porcine) injection 5,000 Units  Every 8 hours         02/22/22 0335     IP VTE HIGH RISK PATIENT  Once         02/22/22 0335     Place sequential compression device  Until discontinued         02/22/22 0335                Discharge Planning   ALIX:      Code Status: Full Code   Is the patient medically ready for discharge?:     Reason for patient still in hospital (select all that apply): Patient new problem, Patient trending condition, Laboratory test and Treatment                     Latoya Luna PA-C  Department of Hospital Medicine   Nagi aiden - Oncology ([facility])

## 2022-02-22 NOTE — SUBJECTIVE & OBJECTIVE
Past Medical History:   Diagnosis Date    Acute hypoxemic respiratory failure 12/19/2019    Acute right-sided thoracic back pain 12/9/2019    Allergy     Asthma     Basal cell carcinoma     left forehead    Basal cell carcinoma     left nose    Basal cell carcinoma 05/20/2015    right nose    Basal cell carcinoma 12/22/2015    left lower post neck    Basal cell carcinoma 12/03/2019    left ant scalp     Breast cancer     CAD (coronary artery disease)     Cardiomyopathy     Cardiomyopathy, ischemic     Cataract     CHF (congestive heart failure)     Chronic kidney disease, stage 3, mod decreased GFR 2/14/2017    Colon polyp 2011    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, with long-term current use of insulin 2/22/2018    COPD (chronic obstructive pulmonary disease)     COPD exacerbation 4/8/2018    Current mild episode of major depressive disorder without prior episode 1/25/2022    Defibrillator discharge     Diabetes mellitus     Diabetes mellitus type II     Diabetes with neurologic complications     Goiter     MNG    HX: breast cancer     Hyperlipidemia     Hypertension     Iron deficiency anemia 5/16/2017    Left kidney mass     Meningioma     Microalbuminuria due to type 2 diabetes mellitus 1/26/2022    Osteoporosis, postmenopausal     Pneumonia 12/8/2019    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    Pseudomonas pneumonia     Skin cancer     s/p excision    Sleep apnea     CPAP    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/2015    mid forehead    Unspecified vitamin D deficiency     Ventricular tachycardia     Vitamin B12 deficiency     Vitamin D deficiency disease        Past Surgical History:   Procedure Laterality Date    BASAL CELL CARCINOMA EXCISION      posterior neck and nose    BREAST BIOPSY      BREAST CYST EXCISION Left     BREAST SURGERY      CARDIAC DEFIBRILLATOR PLACEMENT      x 2     CATARACT EXTRACTION W/  INTRAOCULAR LENS IMPLANT Bilateral     CHOLECYSTECTOMY      COLONOSCOPY N/A 11/5/2019    Procedure: COLONOSCOPY;  Surgeon: Boaz Botello MD;  Location: [facility] ENDO ([address] [place] [address]);  Service: Endoscopy;  Laterality: N/A;  AICD - Med[employer]onic - sm    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    FRACTURE SURGERY      left elbow and wrist as a child    HYSTERECTOMY      MASTECTOMY Right     REPLACEMENT OF IMPLANTABLE CARDIOVERTER-DEFIBRILLATOR (ICD) GENERATOR N/A 12/17/2018    Procedure: REPLACEMENT, ICD GENERATOR;  Surgeon: Jan Mckeon MD;  Location: [facility] EP LAB;  Service: Cardiology;  Laterality: N/A;  DONNA, CRT-D gen change, MDT, MAC, SK, 3 Prep    REVISION OF SKIN POCKET FOR CARDIOVERTER-DEFIBRILLATOR  12/17/2018    Procedure: Revision, Skin Pocket, For Cardioverter-Defibrillator;  Surgeon: Jan Mckeon MD;  Location: [facility] EP LAB;  Service: Cardiology;;    SQUAMOUS CELL CARCINOMA EXCISION      remved from forehead    TONSILLECTOMY         Review of patient's allergies indicates:   Allergen Reactions    Iodine and iodide containing products Hives    Nifedipine      weakness       No current facility-administered medications on file prior to encounter.     Current Outpatient Medications on File Prior to Encounter   Medication Sig    acetaminophen (TYLENOL) 500 MG tablet Take 1,000 mg by mouth daily as needed for Pain.    albuterol (PROVENTIL/VENTOLIN HFA) 90 mcg/actuation inhaler INHALE 2 PUFFS INTO THE LUNGS EVERY 6 (SIX) HOURS AS NEEDED FOR WHEEZING. RESCUE    albuterol-ipratropium (DUO-NEB) 2.5 mg-0.5 mg/3 mL nebulizer solution TAKE 3 MLS BY NEBULIZATION EVERY 4 (FOUR) HOURS AS NEEDED FOR WHEEZING.    ascorbic acid, vitamin C, (VITAMIN C) 100 MG tablet Take 100 mg by mouth once daily.    blood sugar diagnostic (ACCU-CHEK ANGELICA) Strp Uses Accu-Check Angelica meter to test BG 4x/day    carvediloL (COREG) 25 MG tablet TAKE 2 TABLETS (50 MG TOTAL) BY MOUTH 2 (TWO) TIMES DAILY.     "cholecalciferol, vitamin D3, (VITAMIN D3) 50 mcg (2,000 unit) Tab Take 1 tablet by mouth once daily.     cloNIDine 0.1 mg/24 hr td ptwk (CATAPRES) 0.1 mg/24 hr Place 1 patch onto the skin every Tuesday.    CYANOCOBALAMIN, VITAMIN B-12, (B-12 DOTS ORAL) Take 1 tablet by mouth once daily.    diltiaZEM (CARDIZEM CD) 240 MG 24 hr capsule Take 1 capsule (240 mg total) by mouth once daily.    FLUAD QUAD 2021-22,65Y UP,,PF, 60 mcg (15 mcg x 4)/0.5 mL Syrg     fluticasone propionate (FLONASE) 50 mcg/actuation nasal spray 2 sprays (100 mcg total) by Each Nostril route once daily.    fluticasone-salmeterol diskus inhaler 250-50 mcg Inhale 1 puff into the lungs 2 (two) times daily. Controller    furosemide (LASIX) 20 MG tablet Take 1 tablet (20 mg total) by mouth once daily.    hydrALAZINE (APRESOLINE) 100 MG tablet TAKE 1 TABLET BY MOUTH THREE TIMES A DAY    insulin (LANTUS SOLOSTAR U-100 INSULIN) glargine 100 units/mL (3mL) SubQ pen Inject 22 Units into the skin once daily. Increase per providers instruction. Max TDD 40units. (Patient taking differently: Inject 18 Units into the skin once daily. Increase per providers instruction. Max TDD 40units.)    irbesartan (AVAPRO) 300 MG tablet Take 1 tablet (300 mg total) by mouth every evening.    lancets (ACCU-CHEK SOFTCLIX LANCETS) Misc Uses Accu-Chek Angelica meter to test BG 1x/day    levocetirizine (XYZAL) 5 MG tablet Take 1 tablet (5 mg total) by mouth every evening.    magnesium oxide (MAG-OX) 400 mg tablet Take 1 tablet (400 mg total) by mouth once daily.    montelukast (SINGULAIR) 10 mg tablet Take 1 tablet (10 mg total) by mouth every evening.    nitroGLYCERIN (NITROSTAT) 0.4 MG SL tablet Place 0.4 mg under the tongue every 5 (five) minutes as needed for Chest pain.     omega-3 fatty acids 500 mg Cap Take 1 capsule by mouth Twice daily.    pen needle, diabetic (BD ULTRA-FINE MINI PEN NEEDLE) 31 gauge x 3/16" Ndle USE WITH LANTUS AT BEDTIME    pravastatin " (PRAVACHOL) 40 MG tablet Take 1 tablet (40 mg total) by mouth once daily.    pulse oximeter (PULSE OXIMETER) device by Apply Externally route 2 (two) times a day. Use twice daily at 8 AM and 3 PM and record the value in MyChart as directed.    tiotropium (SPIRIVA) 18 mcg inhalation capsule Inhale 1 capsule (18 mcg total) into the lungs once daily. Controller    VENOFER 100 mg iron/5 mL injection      Family History       Problem Relation (Age of Onset)    Asthma Mother    Cancer Brother, Son, Daughter    Diabetes Father, Sister, Brother, Brother, Brother, Brother, Son, Daughter, Son    Heart disease Father, Sister, Brother, Brother, Brother    Hypertension Brother, Brother, Mother    Melanoma Daughter    No Known Problems Maternal Grandmother, Maternal Grandfather, Paternal Grandmother, Paternal Grandfather    Obesity Daughter    Stroke Mother          Tobacco Use    Smoking status: Never Smoker    Smokeless tobacco: Never Used   Substance and Sexual Activity    Alcohol use: No     Alcohol/week: 0.0 standard drinks    Drug use: No    Sexual activity: Yes     Partners: Male     Review of Systems   Constitutional:  Positive for fatigue. Negative for chills, diaphoresis, fever and unexpected weight change.   HENT:  Positive for congestion. Negative for rhinorrhea, sneezing and sore throat.    Eyes:  Negative for photophobia and visual disturbance.   Respiratory:  Positive for cough and shortness of breath. Negative for wheezing.         +PELAYO   Cardiovascular:  Positive for leg swelling. Negative for chest pain and palpitations.   Gastrointestinal:  Negative for abdominal distention, abdominal pain, diarrhea, nausea and vomiting.   Genitourinary:  Negative for difficulty urinating, dysuria, flank pain and hematuria.   Musculoskeletal:  Negative for arthralgias, back pain, myalgias and neck pain.   Skin:  Negative for color change, pallor, rash and wound.   Neurological:  Positive for weakness (generalized).  Negative for dizziness, facial asymmetry, speech difficulty, light-headedness and headaches.   Psychiatric/Behavioral:  Negative for agitation, confusion and hallucinations. The patient is not nervous/anxious.    Objective:     Vital Signs (Most Recent):  Pulse: 96 (02/22/22 0033)  Resp: (!) 23 (02/22/22 0033)  BP: (!) 203/91 (02/22/22 0033)  SpO2: 97 % (02/22/22 0033)   Vital Signs (24h Range):  Pulse:  [84-96] 96  Resp:  [23-25] 23  SpO2:  [93 %-98 %] 97 %  BP: (203-219)/(91-97) 203/91     Weight: 61.7 kg (136 lb)  Body mass index is 24.09 kg/m².    Physical Exam  Vitals and nursing note reviewed.   Constitutional:       General: She is not in acute distress.     Appearance: She is ill-appearing. She is not toxic-appearing or diaphoretic.   HENT:      Head: Normocephalic and atraumatic.      Nose: Nose normal.      Mouth/Throat:      Mouth: Mucous membranes are moist.   Eyes:      Pupils: Pupils are equal, round, and reactive to light.   Neck:      Vascular: JVD present.   Cardiovascular:      Rate and Rhythm: Normal rate and regular rhythm.      Pulses: Normal pulses.      Heart sounds: No murmur heard.  Pulmonary:      Effort: Pulmonary effort is normal. Tachypnea present. No accessory muscle usage.      Breath sounds: Examination of the right-lower field reveals rales. Examination of the left-lower field reveals rales. Wheezing and rales present. No rhonchi.      Comments: Currently requiring 2L nasal cannula  Abdominal:      General: Bowel sounds are normal. There is no distension.      Palpations: Abdomen is soft.      Tenderness: There is no abdominal tenderness. There is no guarding.   Genitourinary:     Comments: deferred  Musculoskeletal:         General: No tenderness or deformity. Normal range of motion.      Cervical back: Normal range of motion.      Right lower leg: Edema present.      Left lower leg: Edema present.      Comments: BLE edema L>R   Skin:     General: Skin is warm and dry.       Capillary Refill: Capillary refill takes less than 2 seconds.   Neurological:      General: No focal deficit present.      Mental Status: She is alert and oriented to person, place, and time.      Cranial Nerves: No dysarthria or facial asymmetry.   Psychiatric:         Mood and Affect: Mood normal.         Behavior: Behavior normal.         Thought Content: Thought content normal.         Judgment: Judgment normal.         CRANIAL NERVES     CN III, IV, VI   Pupils are equal, round, and reactive to light.     Significant Labs: All pertinent labs within the past 24 hours have been reviewed.  CBC:   Recent Labs   Lab 02/21/22 1935   WBC 8.89   HGB 10.9*   HCT 35.4*        CMP:   Recent Labs   Lab 02/21/22 1935      K 4.2      CO2 22*   *   BUN 44*   CREATININE 2.3*   CALCIUM 9.8   PROT 8.1   ALBUMIN 3.5   BILITOT 0.5   ALKPHOS 137*   AST 22   ALT 17   ANIONGAP 15   EGFRNONAA 19.2*     Cardiac Markers:   Recent Labs   Lab 02/21/22 1935   *     Lactic Acid:   Recent Labs   Lab 02/22/22  0040   LACTATE 0.6     Troponin:   Recent Labs   Lab 02/21/22 1935   TROPONINI 0.013     Urine Studies:   Recent Labs   Lab 02/21/22 2256   COLORU Straw   APPEARANCEUA Clear   PHUR 6.0   SPECGRAV 1.010   PROTEINUA 2+*   GLUCUA 1+*   KETONESU Negative   BILIRUBINUA Negative   OCCULTUA Negative   NITRITE Negative   LEUKOCYTESUR Negative   RBCUA 3   WBCUA 1   BACTERIA Rare   SQUAMEPITHEL 0   HYALINECASTS 0       Significant Imaging: I have reviewed all pertinent imaging results/findings within the past 24 hours.    Imaging Results              US Lower Extremity Veins Left (In process)                      X-Ray Chest AP Portable (Final result)  Result time 02/21/22 22:38:35      Final result by Gregory Slade MD (02/21/22 22:38:35)                   Impression:      Stable right pleural fluid.    Stable bilateral reticular opacities suggestive of pulmonary edema or infection.    Electronically  signed by resident: Boris Ferrera  Date:    02/21/2022  Time:    22:34    Electronically signed by: Gregory Slade  Date:    02/21/2022  Time:    22:38               Narrative:    EXAMINATION:  XR CHEST AP PORTABLE    CLINICAL HISTORY:  CHF;    TECHNIQUE:  Single frontal view of the chest was performed.    COMPARISON:  Chest radiograph 02/11/2022 07/23/2021    FINDINGS:  Left-sided cardiac device with transvenous leads.  Cardiac monitoring leads overlying the chest.    Cardiac silhouette is enlarged, stable.    Right pleural fluid, similar to prior.  Stable bilateral reticular opacities.

## 2022-02-22 NOTE — ASSESSMENT & PLAN NOTE
Patient's FSGs are controlled on current hypoglycemics.   Last A1c reviewed-   Lab Results   Component Value Date    HGBA1C 5.9 (H) 02/22/2022     Most recent fingerstick glucose reviewed-   Recent Labs   Lab 02/22/22  0834 02/22/22  1238   POCTGLUCOSE 130* 177*     Current correctional scale  Low  Maintain anti-hyperglycemic dose as follows-   Antihyperglycemics (From admission, onward)            Start     Stop Route Frequency Ordered    02/22/22 0900  insulin detemir U-100 pen 18 Units         -- SubQ Daily 02/22/22 0323      Accuchecks AC/HS  Diabetic/cardiac diet  Monitor closely while on prednisone

## 2022-02-22 NOTE — PT/OT/SLP EVAL
Physical Therapy Co Evaluation and Tx    Patient Name:  Myesha Quinones   MRN:  [id number]  Admit Date: 2/21/2022  Admitting Diagnosis:  Acute on chronic combined systolic and diastolic heart failure  Length of Stay: 0 days  Recent Surgery: * No surgery found *    *Co treatment of 2 skilled therapists indicated in order to maximize function and safety of Pt.  Recommendations:   Discharge Recommendations:  home health PT   Discharge Equipment Recommendations: none   Barriers to discharge: None      Assessment:     Myesha Quinones is a 81 y.o. female admitted with a medical diagnosis of Acute on chronic combined systolic and diastolic heart failure.  Pt demonstrates functional mobility deficits and is functioning below baseline. Pt has poor activity tolerance but was able to perform all activites with SPV-SBA, including ambulation. Pt will continue to benefit from acute PT services in order to maximize safety and (I) with functional mobility.    Problem List: weakness, impaired endurance, impaired sensation, impaired self care skills, impaired functional mobilty, gait instability, impaired balance  Rehab Prognosis: Good; patient would benefit from acute skilled PT services to address these deficits and reach maximum level of function.        Plan:   During this hospitalization, patient to be seen 3 x/week to address the above listed problems via gait training, therapeutic activities, therapeutic exercises, neuromuscular re-education  · Plan of Care Expires:  03/22/22    Subjective     Chief Complaint: Shortness of Breath (Taking lasix, cough)    Patient/Family Comments/goals: to go home  Pain/Comfort:  · Pain Rating 1: 0/10  · Pain Rating Post-Intervention 1: 0/10    Social History:  Residence: Pt lives alone in a [facility] with 0 TAMEKA  Support available: family  Equipment Used: rollator  Prior level of function: independent to mod(I) with rollator PRN    Objective:     Communicated with RN prior to session.  Patient  found supine upon PT entry to room.   Additional staff present: OT    General Precautions: Standard, fall   Orthopedic Precautions:N/A   Braces: N/A   Oxygen Device: Nasal Cannula 2L    Exams:  · Cognition:   · AxOx4  · Command following: Follows multistep verbal commands    · Postural Exam:  Patient presented with the following abnormalities:    · -       Rounded shoulders  · -       Forward head  · -       Posterior pelvic tilt  · Sensation:    · -       Intact   · Absent light touch on R great toe    · RLE ROM: WFL  · RLE Strength: WFL  · LLE ROM: WFL  · LLE Strength: WFL    Functional Mobility:  Bed Mobility:     Scooting: supervision  Supine to Sit: supervision  Sit to Supine: supervision  Transfers:     Sit to Stand:  stand by assistance with no AD  Gait:   · Distance: ~10 ft  · Level of Assistance:  stand by assistance  · AD used: rolling walker  · Gait Deviations: decreased speed, step length, swing through, heel strike, forward flexed posture, and downward gaze.   · PT providing verbal and tactile cues for improved upright posture, gaze direction, AD management, and gait fluidity.   Balance:   · Static Sitting: SPV  · Dynamic Sitting: SPV   · Static Standing: SBA  · Dynamic Standing: SBA    Therapeutic Activities & Exercises:      Role of PT in POC   Patient educated on the importance of early mobility to prevent functional decline during hospital stay   Patient was instructed to utilize staff assistance for mobility/transfers.   Patient was educated on PT POC and all questions answered within PT scope of practice.   Patient able to verbalize understanding; will follow-up with pt during current admit for additional questions/concerns within scope of practice.     Outcome Measures:  AM-PAC 6 CLICK MOBILITY  Turning over in bed (including adjusting bedclothes, sheets and blankets)?: 3  Sitting down on and standing up from a chair with arms (e.g., wheelchair, bedside commode, etc.): 3  Moving from lying  on back to sitting on the side of the bed?: 3  Moving to and from a bed to a chair (including a wheelchair)?: 3  Need to walk in hospital room?: 3  Climbing 3-5 steps with a railing?: 2  Basic Mobility Total Score: 17     Patient left up in chair with all lines intact, call button in reach and son and RN present.    GOALS:   Multidisciplinary Problems     Physical Therapy Goals        Problem: Physical Therapy Goal    Goal Priority Disciplines Outcome Goal Variances Interventions   Physical Therapy Goal     PT, PT/OT Ongoing, Progressing     Description: Goals to be met by:  3/8/22    Patient will increase functional independence with mobility by performin. Supine to sit with [place]  2. Sit to supine with [place]  3. Sit to stand transfer with Supervision  4. Gait  x 50 feet with Contact Guard Assistance using Rolling Walker.   5. Lower extremity exercise program x 20 reps per handout, with independence                       History:     Past Medical History:   Diagnosis Date    Acute hypoxemic respiratory failure 2019    Acute right-sided thoracic back pain 2019    Allergy     Asthma     Basal cell carcinoma     left forehead    Basal cell carcinoma     left nose    Basal cell carcinoma 2015    right nose    Basal cell carcinoma 2015    left lower post neck    Basal cell carcinoma 2019    left ant scalp     Breast cancer     CAD (coronary artery disease)     Cardiomyopathy     Cardiomyopathy, ischemic     Cataract     CHF (congestive heart failure)     Chronic kidney disease, stage 3, mod decreased GFR 2017    Colon polyp     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, with long-term current use of insulin 2018    COPD (chronic obstructive pulmonary disease)     COPD exacerbation 2018    Current mild episode of major depressive disorder without prior episode  1/25/2022    Defibrillator discharge     Diabetes mellitus     Diabetes mellitus type II     Diabetes with neurologic complications     Goiter     MNG    HX: breast cancer     Hyperlipidemia     Hypertension     Iron deficiency anemia 5/16/2017    Left kidney mass     Meningioma     Microalbuminuria due to type 2 diabetes mellitus 1/26/2022    Osteoporosis, postmenopausal     Pneumonia 12/8/2019    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    Pseudomonas pneumonia     Skin cancer     s/p excision    Sleep apnea     CPAP    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/2015    mid forehead    Unspecified vitamin D deficiency     Ventricular tachycardia     Vitamin B12 deficiency     Vitamin D deficiency disease        Past Surgical History:   Procedure Laterality Date    BASAL CELL CARCINOMA EXCISION      posterior neck and nose    BREAST BIOPSY      BREAST CYST EXCISION Left     BREAST SURGERY      CARDIAC DEFIBRILLATOR PLACEMENT      x 2    CATARACT EXTRACTION W/  INTRAOCULAR LENS IMPLANT Bilateral     CHOLECYSTECTOMY      COLONOSCOPY N/A 11/5/2019    Procedure: COLONOSCOPY;  Surgeon: Boaz Botello MD;  Location: [facility] ENDO ([address] [place] [address]);  Service: Endoscopy;  Laterality: N/A;  AICD - [employer] -     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    FRACTURE SURGERY      left elbow and wrist as a child    HYSTERECTOMY      MASTECTOMY Right     REPLACEMENT OF IMPLANTABLE CARDIOVERTER-DEFIBRILLATOR (ICD) GENERATOR N/A 12/17/2018    Procedure: REPLACEMENT, ICD GENERATOR;  Surgeon: Jan Mckeon MD;  Location: [facility] EP LAB;  Service: Cardiology;  Laterality: N/A;  DONNA, CRT-D gen change, MDT, MAC, SK, 3 Prep    REVISION OF SKIN POCKET FOR CARDIOVERTER-DEFIBRILLATOR  12/17/2018    Procedure: Revision, Skin Pocket, For Cardioverter-Defibrillator;  Surgeon: Jan Mckeon MD;  Location: [facility] EP LAB;  Service: Cardiology;;    SQUAMOUS CELL CARCINOMA EXCISION      remved from forehead    TONSILLECTOMY          Family History   Problem Relation Age of Onset    Diabetes Father     Heart disease Father     Diabetes Sister     Heart disease Sister     Diabetes Brother     Heart disease Brother     Hypertension Brother     Diabetes Brother     Heart disease Brother     Hypertension Brother     Diabetes Brother     Heart disease Brother     Cancer Brother         colon    Diabetes Brother     Cancer Son         skin    Diabetes Son         prediabetes    Diabetes Daughter         prediabetes    Cancer Daughter         melanoma    Obesity Daughter     Melanoma Daughter     Diabetes Son     Asthma Mother     Hypertension Mother     Stroke Mother     No Known Problems Maternal Grandmother     No Known Problems Maternal Grandfather     No Known Problems Paternal Grandmother     No Known Problems Paternal Grandfather     Amblyopia Neg Hx     Blindness Neg Hx     Cataracts Neg Hx     Glaucoma Neg Hx     Macular degeneration Neg Hx     Retinal detachment Neg Hx     Strabismus Neg Hx     Thyroid disease Neg Hx        Social History     Socioeconomic History    Marital status:    Occupational History    Occupation: Homemaker     Employer: OTHER   Tobacco Use    Smoking status: Never Smoker    Smokeless tobacco: Never Used   Substance and Sexual Activity    Alcohol use: No     Alcohol/week: 0.0 standard drinks    Drug use: No    Sexual activity: Yes     Partners: Male   Other Topics Concern    Are you pregnant or think you may be? No    Breast-feeding No     Time Tracking:     PT Received On: 02/22/22  PT Start Time: 1110     PT Stop Time: 1128  PT Total Time (min): 18 min       Billable Minutes: Evaluation 10 and Gait Training 8    2/22/2022

## 2022-02-22 NOTE — PLAN OF CARE
No acute events. Echo completed at bedside. IV lasix given.  Mucinex and tessalon pearls ordered for cough. Started on daily prednisone 40mg. Resp treatments scheduled. Accuchecks AC HS. Scheduled levemir admin as ordered. Voiding via purewick. Pt remains afebrile. NSR on telemetry. WCTM.

## 2022-02-22 NOTE — ASSESSMENT & PLAN NOTE
Patient's anemia is currently controlled.   Current CBC reviewed-   Lab Results   Component Value Date    HGB 10.9 (L) 02/21/2022    HCT 35.4 (L) 02/21/2022     Monitor serial CBC and transfuse if patient becomes hemodynamically unstable, symptomatic or H/H drops below 7/21.

## 2022-02-22 NOTE — ASSESSMENT & PLAN NOTE
Creatine stable for now. BMP reviewed- noted Estimated Creatinine Clearance: 15.6 mL/min (A) (based on SCr of 2.3 mg/dL (H)). according to latest data. Monitor UOP and serial BMP and adjust therapy as needed. Renally dose meds.

## 2022-02-22 NOTE — H&P
[facility] - Emergency Dept  [facility] Medicine  History & Physical    Patient Name: Myesha Quinones  MRN: [id number]  Patient Class: OP- Observation  Admission Date: 2/21/2022  Attending Physician: Jose Manuel Correia MD   Primary Care Provider: Catrachita Rothman MD         Patient information was obtained from patient, past medical records and ER records.     Subjective:     Principal Problem:Acute on chronic combined systolic and diastolic heart failure    Chief Complaint:   Chief Complaint   Patient presents with    Shortness of Breath     Taking lasix, cough        HPI: Myesha Quinones is a 81 y.o. female with a PMHx of CAD, ischemic cardiomyopathy with LBBB s/p biventricular ICD, CHF, CKD4, IDDM, breast cancer, HTN, asthma, COPD, and HLD who presents to the ED with complaints of shortness of breath. The patient reports shortness of breath beginning about 12 days ago. She reports her shortness of breath is worse with exertion. She was seen in cardiology clinic and started on lasix 20mg daily and reports weight loss of 5lbs and significant improvement of BLE edema. However, her dyspnea has been worsening. She reports a chronic cough that has become productive over the last week, noting green sputum. Endorses associated fatigue and generalized weakness. Denies orthopnea, PND, chest pain, palpitations, fever, chills, nausea, vomiting, diarrhea, or dysuria.    In the ED, patient found to be hypoxic requiring 2L O2 via nasal cannula. Patient also hypertensive and mildly tachycardic, afebrile. CBC unremarkable. CMP with baseline CKD. , improved from previous. Troponin WNL. Lactate and procal in process. UA negative for infection. CXR Stable right pleural fluid. Stable bilateral reticular opacities suggestive of pulmonary edema or infection. EKG with ventricularly paced rhythm, rate 84. The patient received oral hydralazine, duo neb treatment, and IV lasix in the ED.      Past Medical History:   Diagnosis Date     Acute hypoxemic respiratory failure 12/19/2019    Acute right-sided thoracic back pain 12/9/2019    Allergy     Asthma     Basal cell carcinoma     left forehead    Basal cell carcinoma     left nose    Basal cell carcinoma 05/20/2015    right nose    Basal cell carcinoma 12/22/2015    left lower post neck    Basal cell carcinoma 12/03/2019    left ant scalp     Breast cancer     CAD (coronary artery disease)     Cardiomyopathy     Cardiomyopathy, ischemic     Cataract     CHF (congestive heart failure)     Chronic kidney disease, stage 3, mod decreased GFR 2/14/2017    Colon polyp 2011    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, with long-term current use of insulin 2/22/2018    COPD (chronic obstructive pulmonary disease)     COPD exacerbation 4/8/2018    Current mild episode of major depressive disorder without prior episode 1/25/2022    Defibrillator discharge     Diabetes mellitus     Diabetes mellitus type II     Diabetes with neurologic complications     Goiter     MNG    HX: breast cancer     Hyperlipidemia     Hypertension     Iron deficiency anemia 5/16/2017    Left kidney mass     Meningioma     Microalbuminuria due to type 2 diabetes mellitus 1/26/2022    Osteoporosis, postmenopausal     Pneumonia 12/8/2019    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    Pseudomonas pneumonia     Skin cancer     s/p excision    Sleep apnea     CPAP    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/2015    mid forehead    Unspecified vitamin D deficiency     Ventricular tachycardia     Vitamin B12 deficiency     Vitamin D deficiency disease        Past Surgical History:   Procedure Laterality Date    BASAL CELL CARCINOMA EXCISION      posterior neck and nose    BREAST BIOPSY      BREAST CYST EXCISION Left     BREAST SURGERY      CARDIAC DEFIBRILLATOR PLACEMENT      x 2    CATARACT EXTRACTION W/  INTRAOCULAR  LENS IMPLANT Bilateral     CHOLECYSTECTOMY      COLONOSCOPY N/A 11/5/2019    Procedure: COLONOSCOPY;  Surgeon: Boaz Botello MD;  Location: [facility] ENDO (4TH FLR);  Service: Endoscopy;  Laterality: N/A;  AICD - Med[employer] - sm    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    FRACTURE SURGERY      left elbow and wrist as a child    HYSTERECTOMY      MASTECTOMY Right     REPLACEMENT OF IMPLANTABLE CARDIOVERTER-DEFIBRILLATOR (ICD) GENERATOR N/A 12/17/2018    Procedure: REPLACEMENT, ICD GENERATOR;  Surgeon: Jan Mckeon MD;  Location: [facility] EP LAB;  Service: Cardiology;  Laterality: N/A;  DONNA, CRT-D gen change, MDT, MAC, SK, 3 Prep    REVISION OF SKIN POCKET FOR CARDIOVERTER-DEFIBRILLATOR  12/17/2018    Procedure: Revision, Skin Pocket, For Cardioverter-Defibrillator;  Surgeon: Jan Mckeon MD;  Location: [facility] EP LAB;  Service: Cardiology;;    SQUAMOUS CELL CARCINOMA EXCISION      remved from forehead    TONSILLECTOMY         Review of patient's allergies indicates:   Allergen Reactions    Iodine and iodide containing products Hives    Nifedipine      weakness       No current facility-administered medications on file prior to encounter.     Current Outpatient Medications on File Prior to Encounter   Medication Sig    acetaminophen (TYLENOL) 500 MG tablet Take 1,000 mg by mouth daily as needed for Pain.    albuterol (PROVENTIL/VENTOLIN HFA) 90 mcg/actuation inhaler INHALE 2 PUFFS INTO THE LUNGS EVERY 6 (SIX) HOURS AS NEEDED FOR WHEEZING. RESCUE    albuterol-ipratropium (DUO-NEB) 2.5 mg-0.5 mg/3 mL nebulizer solution TAKE 3 MLS BY NEBULIZATION EVERY 4 (FOUR) HOURS AS NEEDED FOR WHEEZING.    ascorbic acid, vitamin C, (VITAMIN C) 100 MG tablet Take 100 mg by mouth once daily.    blood sugar diagnostic (ACCU-CHEK ANGELICA) Strp Uses Accu-Check Angelica meter to test BG 4x/day    carvediloL (COREG) 25 MG tablet TAKE 2 TABLETS (50 MG TOTAL) BY MOUTH 2 (TWO) TIMES DAILY.    cholecalciferol, vitamin D3, (VITAMIN  "D3) 50 mcg (2,000 unit) Tab Take 1 tablet by mouth once daily.     cloNIDine 0.1 mg/24 hr td ptwk (CATAPRES) 0.1 mg/24 hr Place 1 patch onto the skin every Tuesday.    CYANOCOBALAMIN, VITAMIN B-12, (B-12 DOTS ORAL) Take 1 tablet by mouth once daily.    diltiaZEM (CARDIZEM CD) 240 MG 24 hr capsule Take 1 capsule (240 mg total) by mouth once daily.    FLUAD QUAD 2021-22,65Y UP,,PF, 60 mcg (15 mcg x 4)/0.5 mL Syrg     fluticasone propionate (FLONASE) 50 mcg/actuation nasal spray 2 sprays (100 mcg total) by Each Nostril route once daily.    fluticasone-salmeterol diskus inhaler 250-50 mcg Inhale 1 puff into the lungs 2 (two) times daily. Controller    furosemide (LASIX) 20 MG tablet Take 1 tablet (20 mg total) by mouth once daily.    hydrALAZINE (APRESOLINE) 100 MG tablet TAKE 1 TABLET BY MOUTH THREE TIMES A DAY    insulin (LANTUS SOLOSTAR U-100 INSULIN) glargine 100 units/mL (3mL) SubQ pen Inject 22 Units into the skin once daily. Increase per providers instruction. Max TDD 40units. (Patient taking differently: Inject 18 Units into the skin once daily. Increase per providers instruction. Max TDD 40units.)    irbesartan (AVAPRO) 300 MG tablet Take 1 tablet (300 mg total) by mouth every evening.    lancets (ACCU-CHEK SOFTCLIX LANCETS) Misc Uses Accu-Chek Angelica meter to test BG 1x/day    levocetirizine (XYZAL) 5 MG tablet Take 1 tablet (5 mg total) by mouth every evening.    magnesium oxide (MAG-OX) 400 mg tablet Take 1 tablet (400 mg total) by mouth once daily.    montelukast (SINGULAIR) 10 mg tablet Take 1 tablet (10 mg total) by mouth every evening.    nitroGLYCERIN (NITROSTAT) 0.4 MG SL tablet Place 0.4 mg under the tongue every 5 (five) minutes as needed for Chest pain.     omega-3 fatty acids 500 mg Cap Take 1 capsule by mouth Twice daily.    pen needle, diabetic (BD ULTRA-FINE MINI PEN NEEDLE) 31 gauge x 3/16" Ndle USE WITH LANTUS AT BEDTIME    pravastatin (PRAVACHOL) 40 MG tablet Take 1 tablet " (40 mg total) by mouth once daily.    pulse oximeter (PULSE OXIMETER) device by Apply Externally route 2 (two) times a day. Use twice daily at 8 AM and 3 PM and record the value in MyChart as directed.    tiotropium (SPIRIVA) 18 mcg inhalation capsule Inhale 1 capsule (18 mcg total) into the lungs once daily. Controller    VENOFER 100 mg iron/5 mL injection      Family History       Problem Relation (Age of Onset)    Asthma Mother    Cancer Brother, Son, Daughter    Diabetes Father, Sister, Brother, Brother, Brother, Brother, Son, Daughter, Son    Heart disease Father, Sister, Brother, Brother, Brother    Hypertension Brother, Brother, Mother    Melanoma Daughter    No Known Problems Maternal Grandmother, Maternal Grandfather, Paternal Grandmother, Paternal Grandfather    Obesity Daughter    Stroke Mother          Tobacco Use    Smoking status: Never Smoker    Smokeless tobacco: Never Used   Substance and Sexual Activity    Alcohol use: No     Alcohol/week: 0.0 standard drinks    Drug use: No    Sexual activity: Yes     Partners: Male     Review of Systems   Constitutional:  Positive for fatigue. Negative for chills, diaphoresis, fever and unexpected weight change.   HENT:  Positive for congestion. Negative for rhinorrhea, sneezing and sore throat.    Eyes:  Negative for photophobia and visual disturbance.   Respiratory:  Positive for cough and shortness of breath. Negative for wheezing.         +PELAYO   Cardiovascular:  Positive for leg swelling. Negative for chest pain and palpitations.   Gastrointestinal:  Negative for abdominal distention, abdominal pain, diarrhea, nausea and vomiting.   Genitourinary:  Negative for difficulty urinating, dysuria, flank pain and hematuria.   Musculoskeletal:  Negative for arthralgias, back pain, myalgias and neck pain.   Skin:  Negative for color change, pallor, rash and wound.   Neurological:  Positive for weakness (generalized). Negative for dizziness, facial asymmetry,  speech difficulty, light-headedness and headaches.   Psychiatric/Behavioral:  Negative for agitation, confusion and hallucinations. The patient is not nervous/anxious.    Objective:     Vital Signs (Most Recent):  Pulse: 96 (02/22/22 0033)  Resp: (!) 23 (02/22/22 0033)  BP: (!) 203/91 (02/22/22 0033)  SpO2: 97 % (02/22/22 0033)   Vital Signs (24h Range):  Pulse:  [84-96] 96  Resp:  [23-25] 23  SpO2:  [93 %-98 %] 97 %  BP: (203-219)/(91-97) 203/91     Weight: 61.7 kg (136 lb)  Body mass index is 24.09 kg/m².    Physical Exam  Vitals and nursing note reviewed.   Constitutional:       General: She is not in acute distress.     Appearance: She is ill-appearing. She is not toxic-appearing or diaphoretic.   HENT:      Head: Normocephalic and atraumatic.      Nose: Nose normal.      Mouth/Throat:      Mouth: Mucous membranes are moist.   Eyes:      Pupils: Pupils are equal, round, and reactive to light.   Neck:      Vascular: JVD present.   Cardiovascular:      Rate and Rhythm: Normal rate and regular rhythm.      Pulses: Normal pulses.      Heart sounds: No murmur heard.  Pulmonary:      Effort: Pulmonary effort is normal. Tachypnea present. No accessory muscle usage.      Breath sounds: Examination of the right-lower field reveals rales. Examination of the left-lower field reveals rales. Wheezing and rales present. No rhonchi.      Comments: Currently requiring 2L nasal cannula  Abdominal:      General: Bowel sounds are normal. There is no distension.      Palpations: Abdomen is soft.      Tenderness: There is no abdominal tenderness. There is no guarding.   Genitourinary:     Comments: deferred  Musculoskeletal:         General: No tenderness or deformity. Normal range of motion.      Cervical back: Normal range of motion.      Right lower leg: Edema present.      Left lower leg: Edema present.      Comments: BLE edema L>R   Skin:     General: Skin is warm and dry.      Capillary Refill: Capillary refill takes less than  2 seconds.   Neurological:      General: No focal deficit present.      Mental Status: She is alert and oriented to person, place, and time.      Cranial Nerves: No dysarthria or facial asymmetry.   Psychiatric:         Mood and Affect: Mood normal.         Behavior: Behavior normal.         Thought Content: Thought content normal.         Judgment: Judgment normal.         CRANIAL NERVES     CN III, IV, VI   Pupils are equal, round, and reactive to light.     Significant Labs: All pertinent labs within the past 24 hours have been reviewed.  CBC:   Recent Labs   Lab 02/21/22 1935   WBC 8.89   HGB 10.9*   HCT 35.4*        CMP:   Recent Labs   Lab 02/21/22 1935      K 4.2      CO2 22*   *   BUN 44*   CREATININE 2.3*   CALCIUM 9.8   PROT 8.1   ALBUMIN 3.5   BILITOT 0.5   ALKPHOS 137*   AST 22   ALT 17   ANIONGAP 15   EGFRNONAA 19.2*     Cardiac Markers:   Recent Labs   Lab 02/21/22 1935   *     Lactic Acid:   Recent Labs   Lab 02/22/22  0040   LACTATE 0.6     Troponin:   Recent Labs   Lab 02/21/22 1935   TROPONINI 0.013     Urine Studies:   Recent Labs   Lab 02/21/22  2256   COLORU Straw   APPEARANCEUA Clear   PHUR 6.0   SPECGRAV 1.010   PROTEINUA 2+*   GLUCUA 1+*   KETONESU Negative   BILIRUBINUA Negative   OCCULTUA Negative   NITRITE Negative   LEUKOCYTESUR Negative   RBCUA 3   WBCUA 1   BACTERIA Rare   SQUAMEPITHEL 0   HYALINECASTS 0       Significant Imaging: I have reviewed all pertinent imaging results/findings within the past 24 hours.    Imaging Results              US Lower Extremity Veins Left (In process)                      X-Ray Chest AP Portable (Final result)  Result time 02/21/22 22:38:35      Final result by Gregory Slade MD (02/21/22 22:38:35)                   Impression:      Stable right pleural fluid.    Stable bilateral reticular opacities suggestive of pulmonary edema or infection.    Electronically signed by resident: Boris  Alsaggaf  Date:    02/21/2022  Time:    22:34    Electronically signed by: Gregory Slade  Date:    02/21/2022  Time:    22:38               Narrative:    EXAMINATION:  XR CHEST AP PORTABLE    CLINICAL HISTORY:  CHF;    TECHNIQUE:  Single frontal view of the chest was performed.    COMPARISON:  Chest radiograph 02/11/2022 07/23/2021    FINDINGS:  Left-sided cardiac device with transvenous leads.  Cardiac monitoring leads overlying the chest.    Cardiac silhouette is enlarged, stable.    Right pleural fluid, similar to prior.  Stable bilateral reticular opacities.                                        Assessment/Plan:     * Acute on chronic combined systolic and diastolic heart failure  Nonischemic cardiomyopathy  Patient is identified as having Combined Systolic and Diastolic heart failure that is Acute on Chronic. CHF is currently uncontrolled due to volume overload due to: Continued edema of extremities and JVD, Rales/crackles on pulmonary exam and Pulmonary edema/pleural effusion on CXR. Latest ECHO performed and demonstrates- Results for orders placed during the hospital encounter of 03/23/21    Echo Color Flow Doppler? Yes    Interpretation Summary  · The left ventricle is normal in size with eccentric hypertrophy and low normal systolic function. The estimated ejection fraction is 50%  · There are segmental left ventricular wall motion abnormalities.  · Normal left ventricular diastolic function.  · Normal right ventricular size with normal right ventricular systolic function.  · Mild tricuspid regurgitation.  · The estimated PA systolic pressure is 44 mmHg.  · There is pulmonary hypertension.  · Normal central venous pressure (3 mmHg).  . Continue Beta Blocker ACE/ARB Furosemide Nitrate/Vasodilator and monitor clinical status closely. Monitor on telemetry. Patient is on CHF pathway.  Monitor strict Is&Os and daily weights.  Place on fluid restriction of 1.5 L. Continue to stress to patient importance of self  efficacy and  on diet for CHF. Last BNP reviewed- and noted below   Recent Labs   Lab 02/21/22 1935   *   -ECHO ordered    Acute hypoxemic respiratory failure  Patient with acute hypoxemic respiratory failure likely secondary to CHF exacerbation. Patient reporting worsening cough for the last week with green sputum. CXR with stable right pleural fluid. Stable bilateral reticular opacities suggestive of pulmonary edema or infection.    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   Last A1c reviewed-   Lab Results   Component Value Date    HGBA1C 5.9 (H) 02/22/2022     Most recent fingerstick glucose reviewed- No results for input(s): POCTGLUCOSE in the last 24 hours.  Current correctional scale  Low  Maintain anti-hyperglycemic dose as follows-   Antihyperglycemics (From admission, onward)            Start     Stop Route Frequency Ordered    02/22/22 0900  insulin detemir U-100 pen 18 Units         -- SubQ Daily 02/22/22 0323      Accuchecks AC/HS  Diabetic/cardiac diet    Hyperlipidemia associated with type 2 diabetes mellitus   Patient is chronically on statin.will continue for now. Monitor clinically. Last LDL was   Lab Results   Component Value Date    LDLCALC 43.8 (L) 02/08/2022       CKD (chronic kidney disease) stage 4, GFR 15-29 ml/min  Creatine stable for now. BMP reviewed- noted Estimated Creatinine Clearance: 15.6 mL/min (A) (based on SCr of 2.3 mg/dL (H)). according to latest data. Monitor UOP and serial BMP and adjust therapy as needed. Renally dose meds.    COPD (chronic obstructive pulmonary disease)  -Continue supplemental oxygen as needed  -PRN duo  nebs  -Continue daily inhalers    Iron deficiency anemia  Patient's anemia is currently controlled.   Current CBC reviewed-   Lab Results   Component Value Date    HGB 10.9 (L) 02/21/2022    HCT 35.4 (L) 02/21/2022     Monitor serial CBC and transfuse if patient becomes hemodynamically unstable, symptomatic or H/H drops below 7/21.     Hypertension associated with diabetes  -Initially hypertensive, improved with home medications.  -Continue coreg 25mg BID, cardizem 240mg daily, hydralazine 100mg TID, ibersartan 300mg qhs.    Biventricular ICD (implantable cardioverter-defibrillator) in place  Seen on CXR  - stable    Asthma, chronic  -Continue montelukast  -PRN duo nebs    VTE Risk Mitigation (From admission, onward)         Ordered     heparin (porcine) injection 5,000 Units  Every 8 hours         02/22/22 0335     IP VTE HIGH RISK PATIENT  Once         02/22/22 0335     Place sequential compression device  Until discontinued         02/22/22 0335                   Magi Feldman NP  Department of Hospital Medicine   [facility] - Emergency Dept

## 2022-02-22 NOTE — SUBJECTIVE & OBJECTIVE
Interval History: Patient seen at bedside with son. Reports improvement in lower extremity and shortness of breath. Still with significant wheezing. Weaned from 2L to 1L NC. Started duonebs, prednisone, azithro for COPD.     Review of Systems   Constitutional:  Positive for fatigue. Negative for chills and fever.   HENT:  Negative for congestion and rhinorrhea.    Respiratory:  Positive for cough, shortness of breath and wheezing. Negative for chest tightness.    Cardiovascular:  Positive for leg swelling. Negative for chest pain and palpitations.   Gastrointestinal:  Negative for abdominal distention, abdominal pain, diarrhea and nausea.   Genitourinary:  Negative for difficulty urinating, dysuria, flank pain and hematuria.   Musculoskeletal:  Negative for arthralgias and back pain.   Neurological:  Negative for dizziness, numbness and headaches.   Psychiatric/Behavioral:  Negative for agitation and behavioral problems.    Objective:     Vital Signs (Most Recent):  Temp: 96.4 °F (35.8 °C) (02/22/22 1130)  Pulse: 81 (02/22/22 1223)  Resp: 18 (02/22/22 1223)  BP: (!) 161/71 (02/22/22 1130)  SpO2: 97 % (02/22/22 1223)   Vital Signs (24h Range):  Temp:  [96.4 °F (35.8 °C)-98.4 °F (36.9 °C)] 96.4 °F (35.8 °C)  Pulse:  [77-98] 81  Resp:  [18-25] 18  SpO2:  [89 %-98 %] 97 %  BP: (161-219)/(71-97) 161/71     Weight: 60.3 kg (133 lb)  Body mass index is 23.56 kg/m².    Intake/Output Summary (Last 24 hours) at 2/22/2022 1309  Last data filed at 2/22/2022 0230  Gross per 24 hour   Intake --   Output 2350 ml   Net -2350 ml      Physical Exam  Vitals and nursing note reviewed.   Constitutional:       General: She is not in acute distress.     Appearance: Normal appearance. She is not ill-appearing.   HENT:      Head: Normocephalic and atraumatic.      Nose: Nose normal. No congestion.      Mouth/Throat:      Mouth: Mucous membranes are moist.      Pharynx: Oropharynx is clear.   Eyes:      Extraocular Movements: Extraocular  movements intact.      Conjunctiva/sclera: Conjunctivae normal.      Pupils: Pupils are equal, round, and reactive to light.   Cardiovascular:      Rate and Rhythm: Normal rate and regular rhythm.      Pulses: Normal pulses.      Heart sounds: Normal heart sounds. No murmur heard.  Pulmonary:      Breath sounds: Wheezing (inspiratory and expiratory throughout) and rales (faint bibasilar) present.      Comments: 1L NC  Chest:      Chest wall: No tenderness.   Abdominal:      General: Abdomen is flat. Bowel sounds are normal. There is no distension.      Palpations: Abdomen is soft.      Tenderness: There is no abdominal tenderness.   Musculoskeletal:         General: Normal range of motion.      Right lower leg: Edema (1+) present.      Left lower leg: Edema (2+) present.      Comments: Chronic RUE swelling from prior mastectomy    Skin:     General: Skin is warm and dry.      Capillary Refill: Capillary refill takes less than 2 seconds.   Neurological:      General: No focal deficit present.      Mental Status: She is alert and oriented to person, place, and time. Mental status is at baseline.   Psychiatric:         Mood and Affect: Mood normal.         Behavior: Behavior normal.       Significant Labs: All pertinent labs within the past 24 hours have been reviewed.  CBC:   Recent Labs   Lab 02/21/22 1935 02/22/22  0519   WBC 8.89 9.77   HGB 10.9* 10.4*   HCT 35.4* 34.5*    250     CMP:   Recent Labs   Lab 02/21/22 1935      K 4.2      CO2 22*   *   BUN 44*   CREATININE 2.3*   CALCIUM 9.8   PROT 8.1   ALBUMIN 3.5   BILITOT 0.5   ALKPHOS 137*   AST 22   ALT 17   ANIONGAP 15   EGFRNONAA 19.2*     Magnesium:   Recent Labs   Lab 02/21/22 1935 02/22/22  0519   MG 2.0 1.9       Significant Imaging: I have reviewed all pertinent imaging results/findings within the past 24 hours.

## 2022-02-22 NOTE — ASSESSMENT & PLAN NOTE
Nonischemic cardiomyopathy  Patient is identified as having Combined Systolic and Diastolic heart failure that is Acute on Chronic. CHF is currently uncontrolled due to volume overload due to: Continued edema of extremities and JVD, Rales/crackles on pulmonary exam and Pulmonary edema/pleural effusion on CXR. Latest ECHO performed and demonstrates- Results for orders placed during the hospital encounter of 03/23/21    Echo Color Flow Doppler? Yes    Interpretation Summary  · The left ventricle is normal in size with eccentric hypertrophy and low normal systolic function. The estimated ejection fraction is 50%  · There are segmental left ventricular wall motion abnormalities.  · Normal left ventricular diastolic function.  · Normal right ventricular size with normal right ventricular systolic function.  · Mild tricuspid regurgitation.  · The estimated PA systolic pressure is 44 mmHg.  · There is pulmonary hypertension.  · Normal central venous pressure (3 mmHg).  . Continue Beta Blocker ACE/ARB Furosemide Nitrate/Vasodilator and monitor clinical status closely. Monitor on telemetry. Patient is on CHF pathway.  Monitor strict Is&Os and daily weights.  Place on fluid restriction of 1.5 L. Continue to stress to patient importance of self efficacy and  on diet for CHF. Last BNP reviewed- and noted below   Recent Labs   Lab 02/21/22 1935   *   -ECHO ordered  - home lasix 20 mg daily  - hospital lasix 20mg IV BID

## 2022-02-22 NOTE — ASSESSMENT & PLAN NOTE
Patient is chronically on statin.will continue for now. Monitor clinically. Last LDL was   Lab Results   Component Value Date    LDLCALC 43.8 (L) 02/08/2022

## 2022-02-22 NOTE — ED PROVIDER NOTES
Encounter Date: 2/21/2022     History     Chief Complaint   Patient presents with    Shortness of Breath     Taking lasix, cough     81 year old female with CAD, ischemic cardiomyopathy with LBBB s/p biventricular ICD, HFpEF (G2DD), CKD4, insulin dependent T2DM,  breast cancer, HTN, and HLD presenting with dyspnea. Reports the dyspnea has been worsening for the past 10 days. Saw an NP who started her on lasix 20mg daily (normally does not take lasix). Reports weight loss of 5 lbs over this time, however dypsnea has been worsening. Denies orthopnea or PND. Has BL LL edema and PELAYO which has been getting worse. Reports productive cough of green sputum for the past week. Denies fevers, rhinorrhea, or sore throat. Denies chest pain or palpitations. Denies abdominal pain, nausea, or vomiting. Denies dysuria or hematuria. Denies headache, lightheadedness, dizziness, or vision changes. No sick contacts or recent travel. No personal or FHx DVT/PE. No hormone use.         Review of patient's allergies indicates:   Allergen Reactions    Iodine and iodide containing products Hives    Nifedipine      weakness     Past Medical History:   Diagnosis Date    Acute hypoxemic respiratory failure 12/19/2019    Acute right-sided thoracic back pain 12/9/2019    Allergy     Asthma     Basal cell carcinoma     left forehead    Basal cell carcinoma     left nose    Basal cell carcinoma 05/20/2015    right nose    Basal cell carcinoma 12/22/2015    left lower post neck    Basal cell carcinoma 12/03/2019    left ant scalp     Breast cancer     CAD (coronary artery disease)     Cardiomyopathy     Cardiomyopathy, ischemic     Cataract     CHF (congestive heart failure)     Chronic kidney disease, stage 3, mod decreased GFR 2/14/2017    Colon polyp 2011    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, with long-term current use of insulin 2/22/2018     COPD (chronic obstructive pulmonary disease)     COPD exacerbation 4/8/2018    Current mild episode of major depressive disorder without prior episode 1/25/2022    Defibrillator discharge     Diabetes mellitus     Diabetes mellitus type II     Diabetes with neurologic complications     Goiter     MNG    HX: breast cancer     Hyperlipidemia     Hypertension     Iron deficiency anemia 5/16/2017    Left kidney mass     Meningioma     Microalbuminuria due to type 2 diabetes mellitus 1/26/2022    Osteoporosis, postmenopausal     Pneumonia 12/8/2019    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    Pseudomonas pneumonia     Skin cancer     s/p excision    Sleep apnea     CPAP    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/2015    mid forehead    Unspecified vitamin D deficiency     Ventricular tachycardia     Vitamin B12 deficiency     Vitamin D deficiency disease      Past Surgical History:   Procedure Laterality Date    BASAL CELL CARCINOMA EXCISION      posterior neck and nose    BREAST BIOPSY      BREAST CYST EXCISION Left     BREAST SURGERY      CARDIAC DEFIBRILLATOR PLACEMENT      x 2    CATARACT EXTRACTION W/  INTRAOCULAR LENS IMPLANT Bilateral     CHOLECYSTECTOMY      COLONOSCOPY N/A 11/5/2019    Procedure: COLONOSCOPY;  Surgeon: Boaz Botello MD;  Location: [facility] ([address] [place] [address]);  Service: Endoscopy;  Laterality: N/A;  AICD - [employer] -     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    FRACTURE SURGERY      left elbow and wrist as a child    HYSTERECTOMY      MASTECTOMY Right     REPLACEMENT OF IMPLANTABLE CARDIOVERTER-DEFIBRILLATOR (ICD) GENERATOR N/A 12/17/2018    Procedure: REPLACEMENT, ICD GENERATOR;  Surgeon: Jan Mckeon MD;  Location: [facility];  Service: Cardiology;  Laterality: N/A;  DONNA, CRT-D gen sulema, MDT, MAC, SK, 3 Prep    REVISION OF SKIN POCKET FOR CARDIOVERTER-DEFIBRILLATOR  12/17/2018    Procedure: Revision, Skin Pocket, For Cardioverter-Defibrillator;   Surgeon: Jan Mckeon MD;  Location: [facility];  Service: Cardiology;;    SQUAMOUS CELL CARCINOMA EXCISION      remved from forehead    TONSILLECTOMY       Family History   Problem Relation Age of Onset    Diabetes Father     Heart disease Father     Diabetes Sister     Heart disease Sister     Diabetes Brother     Heart disease Brother     Hypertension Brother     Diabetes Brother     Heart disease Brother     Hypertension Brother     Diabetes Brother     Heart disease Brother     Cancer Brother         colon    Diabetes Brother     Cancer Son         skin    Diabetes Son         prediabetes    Diabetes Daughter         prediabetes    Cancer Daughter         melanoma    Obesity Daughter     Melanoma Daughter     Diabetes Son     Asthma Mother     Hypertension Mother     Stroke Mother     No Known Problems Maternal Grandmother     No Known Problems Maternal Grandfather     No Known Problems Paternal Grandmother     No Known Problems Paternal Grandfather     Amblyopia Neg Hx     Blindness Neg Hx     Cataracts Neg Hx     Glaucoma Neg Hx     Macular degeneration Neg Hx     Retinal detachment Neg Hx     Strabismus Neg Hx     Thyroid disease Neg Hx      Social History     Tobacco Use    Smoking status: Never Smoker    Smokeless tobacco: Never Used   Substance Use Topics    Alcohol use: No     Alcohol/week: 0.0 standard drinks    Drug use: No     Review of Systems   Constitutional: Negative for fatigue and fever.   HENT: Negative.    Eyes: Negative.    Respiratory: Positive for cough, shortness of breath and wheezing.    Cardiovascular: Positive for leg swelling. Negative for chest pain and palpitations.   Gastrointestinal: Negative for abdominal pain, diarrhea, nausea and vomiting.   Genitourinary: Negative for dysuria and hematuria.   Musculoskeletal: Negative.    Skin: Negative.    Neurological: Negative for dizziness, light-headedness and headaches.    Psychiatric/Behavioral: Negative.        Physical Exam     Initial Vitals [02/21/22 1831]   BP Pulse Resp Temp SpO2   (!) 219/91 84 (!) 24 -- 95 %      MAP       --         Physical Exam    Nursing note and vitals reviewed.  Constitutional: She is not diaphoretic.  Non-toxic appearance. She appears ill.   HENT:   Head: Normocephalic and atraumatic.   Eyes: EOM are normal. Pupils are equal, round, and reactive to light.   Neck: JVD (to almost angle of mandible) present.   Cardiovascular: Normal rate and regular rhythm.   No murmur heard.  Pulmonary/Chest: Tachypnea noted. She has wheezes (BL). She has no rhonchi. She has rales (BL bases).   Abdominal: Abdomen is soft. Bowel sounds are normal. She exhibits no distension. There is no abdominal tenderness. There is no guarding.   Musculoskeletal:         General: Edema (BL LL pitting edema to almost the knee. Left calf larger than right which Pt states is longstanding) present. No tenderness.     Neurological: She is alert and oriented to person, place, and time.   Skin: Skin is warm. Capillary refill takes less than 2 seconds. No rash noted. No erythema.   Psychiatric: She has a normal mood and affect. Thought content normal.       ED Course   Procedures  Labs Reviewed   CBC W/ AUTO DIFFERENTIAL - Abnormal; Notable for the following components:       Result Value    RBC 3.77 (*)     Hemoglobin 10.9 (*)     Hematocrit 35.4 (*)     MCHC 30.8 (*)     Immature Granulocytes 0.9 (*)     Immature Grans (Abs) 0.08 (*)     Gran % 73.6 (*)     Lymph % 13.7 (*)     All other components within normal limits   COMPREHENSIVE METABOLIC PANEL - Abnormal; Notable for the following components:    CO2 22 (*)     Glucose 120 (*)     BUN 44 (*)     Creatinine 2.3 (*)     Alkaline Phosphatase 137 (*)     eGFR if  22.1 (*)     eGFR if non  19.2 (*)     All other components within normal limits   B-TYPE NATRIURETIC PEPTIDE - Abnormal; Notable for the  following components:     (*)     All other components within normal limits   TROPONIN I   SARS-COV-2 RDRP GENE     EKG Readings: (Independently Interpreted)   Initial Reading: No STEMI. Previous EKG: Compared with most recent EKG Rhythm: Paced Rhythm. ST Segments: Normal ST Segments. T Waves: Normal.     ECG Results          EKG 12-lead (In process)  Result time 02/21/22 21:05:45    In process by Interface, Lab In [facility] (02/21/22 21:05:45)                 Narrative:    Test Reason : R06.02,    Vent. Rate : 084 BPM     Atrial Rate : 084 BPM     P-R Int : 146 ms          QRS Dur : 130 ms      QT Int : 424 ms       P-R-T Axes : 065 259 074 degrees     QTc Int : 501 ms    Atrial-sensed ventricular-paced rhythm  Abnormal ECG  When compared with ECG of 11-FEB-2022 09:48,  Vent. rate has increased BY  10 BPM    Referred By: [email or web address]   SELF           Confirmed By:                             Imaging Results    None          Medications - No data to display  Medical Decision Making:   History:   Old Medical Records: I decided to obtain old medical records.  Differential Diagnosis:   Acute decompensated CHF  PNA  ACS  Hypertensive urgency    Clinical Tests:   Lab Tests: Ordered and Reviewed  The following lab test(s) were unremarkable: CBC, CMP, Troponin and BNP  Radiological Study: Ordered and Reviewed  Medical Tests: Ordered and Reviewed  ED Management:  80 yo F with CAD, ischemic cardiomyopathy with LBBB s/p biventricular ICD, HFpEF (G2DD), CKD4, HTN, and HLD presenting with 10 days of worsening dyspnea. Reports she was started on lasix 20mg daily 10 days ago; prior to this was not taking lasix. Reports PELAYO, denies PND or orthopnea. Has lost weight (about 5 lbs) since starting the lasix but feel the SOB is getting worse. Denies chest pain or palpitations. On arrival to ED, hypertensive with SBP > 200, tachypneic RR 24, initially saturating well on RA 95% and then desaturated to low 90s. JVD to angle of mandible,  rales BL bases and wheeze, BL LL pitting edema to almost the knee. Placed on 2L NC and now saturating 98%. CBC demonstrating chronic, stable anemia, no leukocytosis, normal platelets. CMP remarkable for VIRGILIO, sCR 2.3 (baseline appears to be ~ 2.0). Electrolytes WNL. , troponin negative. EKG showing paced rhythm, no acute ischemic changes. CXR demonstrating pulmonary edema and right pleural effusion. COVID negative. Hypertensive but asymptomatic; denies headache, vision changes, lightheadedness, or dizziness. On several anti-hypertensives at home, will hold ARB in setting of VIRGILIO. Will give hydralazine 25mg for HTN. Given lasix 40mg in ED. Antibiotics held as Pt is afebrile and no current concerns for PNA. Disposition is admission for management of ADCHF and VIRGILIO on CKD.   Other:   I discussed test(s) with the performing physician.  I have discussed this case with another health care provider.                    Clinical Impression:   Final diagnoses:  [R06.02] SOB (shortness of breath)  [R06.02] Shortness of breath               Karin Zheng MD  Resident  02/22/22 0034

## 2022-02-22 NOTE — HOSPITAL COURSE
Mrs. Quinones was admitted to observation for CHF and COPD exacerbation. Afebrile without leukocytosis. Started on Lasix IV 20 mg BID and scheduled duonebs, prednisone, and antibiotics. Diuresed well, net neg 2L, however her Cr continues to increase. Suspect over diuresis. Will check urine studies, RP sono unremarkable. Give small IVF bolus. Nephrology consulted for further assistance, agree with over diuresis. Continue fluid restriction and T2DM control. BMP BID, Cr improving and BG. BP continues to be uncontrolled, IMDUR added to regimen. 2/28 patient began having significant urinary retention, bladder scan reveals >1L. Start oxybutynin and monitor, failed voiding trial, lauren placed. Stable for dc with cards, urology fu. Return precautions discussed, [facility] orders done

## 2022-02-22 NOTE — PT/OT/SLP EVAL
Occupational Therapy   Evaluation and Treatment    Name: Myesha Quinones  MRN: [id number]  Admitting Diagnosis:  Acute on chronic combined systolic and diastolic heart failure  Recent Surgery: * No surgery found *       Co-eval with PT to have 2 skilled therapists present to safely assess pt's functional mobility    Recommendations:     Discharge Recommendations: home health OT  Discharge Equipment Recommendations:  none  Barriers to discharge:  None    Assessment:     Myesha Quinones is a 81 y.o. female with a medical diagnosis of Acute on chronic combined systolic and diastolic heart failure.  Pt had good tolerance of session, performing functional transfers and ambulating with SBA with RW.  Due to her current level of function compared to her PLOF and her home environment, HHOT recommended at d/c for maximal pt gains in functional independence and to ensure pt's safety in her home.  She presents with the following. Performance deficits affecting function: weakness, impaired endurance, impaired sensation, impaired self care skills, impaired functional mobilty, gait instability, impaired balance, impaired cardiopulmonary response to activity.      Rehab Prognosis: Good; patient would benefit from acute skilled OT services to address these deficits and reach maximum level of function.       Plan:     Patient to be seen 3 x/week to address the above listed problems via self-care/home management, therapeutic exercises, therapeutic activities  · Plan of Care Expires: 03/22/22  · Plan of Care Reviewed with: patient, son    Subjective     Chief Complaint: SOB during activity  Patient/Family Comments/goals: to get stronger and return to PLOF    Occupational Profile:  Living Environment: Pt lives alone in a H with 0 TAMEKA. She has step-in shower with no DME.  Pt reports no recent falls.  Previous level of function: Independent with ADLs and Independent - Mod I for functional mobility, using a rollator for community  distances due to becoming SOB.  Pt drives.   Roles and Routines: mother   Equipment Used at Home:  rollator  Assistance upon Discharge: Her family can provide her as much assistance as she will need     Pain/Comfort:  · Pain Rating 1: 0/10  · Pain Rating Post-Intervention 1: 0/10    Patients cultural, spiritual, Methodist conflicts given the current situation: no    Objective:     Communicated with: nursing and PT prior to session.  Patient found HOB elevated with PureWick, oxygen, telemetry, peripheral IV with her sonHerminio, present upon OT entry to room.    General Precautions: Standard, fall   Orthopedic Precautions:N/A   Braces: N/A  Respiratory Status: Nasal cannula, flow 2 L/min    Occupational Performance:    Bed Mobility:    · Patient completed Rolling/Turning to Right with supervision  · Patient completed Scooting/Bridging with supervision  · Patient completed Supine to Sit with supervision    Functional Mobility/Transfers:  · Patient completed Sit <> Stand Transfer from EOB with stand by assistance  with  rolling walker   · Patient completed Bed <> Chair Transfer using Step Transfer technique with stand by assistance with rolling walker  · Functional Mobility: Pt ambulated ~10 ft with SBA with RW.  She reported mild lightheadedness but had no LOB.      Activities of Daily Living:  · Lower Body Dressing: Setup assistance to doff her socks/elva non-skid socks while sitting EOB   · Toileting: PureWick to urinate due to medication causing frequent urination.  She was able to remove it independently while sitting EOB but required Max A to insert a clean one while sitting UIC.     Cognitive/Visual Perceptual:  Cognitive/Psychosocial Skills:     -       Oriented to: Person, Place, Time and Situation   -       Follows Commands/attention:Follows multistep  commands  -       Communication: clear/fluent    Physical Exam:  Sensation:  -       Tingling at the top of her feet  Dominant hand:    -       R-handed  Upper  Extremity Range of Motion:     -       Right Upper Extremity: WFL  -       Left Upper Extremity: WFL  Upper Extremity Strength:    -       Right Upper Extremity: WFL  -       Left Upper Extremity: WFL   Strength:    -       Right Upper Extremity: WFL  -       Left Upper Extremity: WFL  Fine Motor Coordination:    -       Intact  Left hand thumb/finger opposition skills and Right hand thumb/finger opposition skills    AMPA 6 Click ADL:  [facility] Total Score: 19    Treatment & Education:  Pt and her son edu on role of OT, POC, safety when performing self care tasks, benefit of performing OOB activity, and safety when performing functional transfers and mobility.    - Self care tasks completed-- as noted above     Education:    Patient left up in chair with all lines intact, call button in reach, nursing notified and her son and nursing  present    GOALS:   Multidisciplinary Problems     Occupational Therapy Goals        Problem: Occupational Therapy Goal    Goal Priority Disciplines Outcome Interventions   Occupational Therapy Goal     OT, PT/OT Ongoing, Progressing    Description: Goals to be met by: 3/8/2022     Patient will increase functional independence with ADLs by performing:    UE Dressing with [place].  LE Dressing with Modified [place].  Grooming while standing at sink with Modified [place].  Toileting from toilet with Modified [place] for hygiene and clothing management.   Supine to sit with Modified [place].  Sit to stand transfer with Modified [place] with or without AD.  Toilet transfer to toilet with Modified [place] with or without AD.                     History:     Past Medical History:   Diagnosis Date    Acute hypoxemic respiratory failure 12/19/2019    Acute right-sided thoracic back pain 12/9/2019    Allergy     Asthma     Basal cell carcinoma     left forehead    Basal cell carcinoma     left nose    Basal cell carcinoma 05/20/2015    right nose     Basal cell carcinoma 12/22/2015    left lower post neck    Basal cell carcinoma 12/03/2019    left ant scalp     Breast cancer     CAD (coronary artery disease)     Cardiomyopathy     Cardiomyopathy, ischemic     Cataract     CHF (congestive heart failure)     Chronic kidney disease, stage 3, mod decreased GFR 2/14/2017    Colon polyp 2011    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, with long-term current use of insulin 2/22/2018    COPD (chronic obstructive pulmonary disease)     COPD exacerbation 4/8/2018    Current mild episode of major depressive disorder without prior episode 1/25/2022    Defibrillator discharge     Diabetes mellitus     Diabetes mellitus type II     Diabetes with neurologic complications     Goiter     MNG    HX: breast cancer     Hyperlipidemia     Hypertension     Iron deficiency anemia 5/16/2017    Left kidney mass     Meningioma     Microalbuminuria due to type 2 diabetes mellitus 1/26/2022    Osteoporosis, postmenopausal     Pneumonia 12/8/2019    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    Pseudomonas pneumonia     Skin cancer     s/p excision    Sleep apnea     CPAP    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/2015    mid forehead    Unspecified vitamin D deficiency     Ventricular tachycardia     Vitamin B12 deficiency     Vitamin D deficiency disease        Past Surgical History:   Procedure Laterality Date    BASAL CELL CARCINOMA EXCISION      posterior neck and nose    BREAST BIOPSY      BREAST CYST EXCISION Left     BREAST SURGERY      CARDIAC DEFIBRILLATOR PLACEMENT      x 2    CATARACT EXTRACTION W/  INTRAOCULAR LENS IMPLANT Bilateral     CHOLECYSTECTOMY      COLONOSCOPY N/A 11/5/2019    Procedure: COLONOSCOPY;  Surgeon: Boaz Botello MD;  Location: [facility] ([address] [place] [address]);  Service: Endoscopy;  Laterality: N/A;  [facility] -     fibrosarcoma  1969    removed from  neck area    FRACTURE SURGERY      left elbow and wrist as a child    HYSTERECTOMY      MASTECTOMY Right     REPLACEMENT OF IMPLANTABLE CARDIOVERTER-DEFIBRILLATOR (ICD) GENERATOR N/A 12/17/2018    Procedure: REPLACEMENT, ICD GENERATOR;  Surgeon: Jan Mckeon MD;  Location: [facility];  Service: Cardiology;  Laterality: N/A;  DONNA, CRT-D gen change, VICKEY, MAC, SK, 3 Prep    REVISION OF SKIN POCKET FOR CARDIOVERTER-DEFIBRILLATOR  12/17/2018    Procedure: Revision, Skin Pocket, For Cardioverter-Defibrillator;  Surgeon: Jan Mckeon MD;  Location: [facility];  Service: Cardiology;;    SQUAMOUS CELL CARCINOMA EXCISION      remved from forehead    TONSILLECTOMY         Time Tracking:     OT Date of Treatment: 02/22/22  OT Start Time: 1110  OT Stop Time: 1126  OT Total Time (min): 16 min    Billable Minutes:Evaluation 8 min  Self Care/Home Management 8 min    2/22/2022

## 2022-02-22 NOTE — CONSULTS
Food & Nutrition  Education    Diet Education: Low Sodium   Learners: Pt and pt's daughter       Nutrition Education provided with handouts: Heart Failure Nutrition Therapy       Comments: Pt admitted with SOB. Pt resting in bed, daughter at bedside. Pt reports following low sodium diet as home. Dicussed foods recommended and not recommended. Spoke about following fluid restriction per MD. Pt verbalized understanding with no questions or concerns.     Pt with decreased po intake x 2 weeks PTA. No significant wt changes PTA.  lbs. Pt appears nourished with age related wasting. Pt at risk for acute malnutrition.       Follow-Up: Yes     Please re-consult as needed.

## 2022-02-22 NOTE — ASSESSMENT & PLAN NOTE
Patient with acute hypoxemic respiratory failure likely secondary to CHF exacerbation + COPD exacerbation. Patient reporting worsening cough for the last week with green sputum. CXR with stable right pleural fluid. Stable bilateral reticular opacities suggestive of pulmonary edema or infection.    -Procalcitonin and lactic WNL. Do not suspect PNA  -CTX/ doxy switched to azithromycin for COPD exacerbation + prednisone, scheduled breathing treatments  - continue diuresis  - wean oxygen as tolerated, currently on 1L NC

## 2022-02-22 NOTE — PROGRESS NOTES
"[facility]) nurse navigator notified of HFT[facility] candidate in need of education and introduction to 4-6 week program.      PT aao x 3 while lying in bed with no family currently at bedside. Introduced self to pt as [facility] JOSE D.     Patient given "Home Care Guide for Heart Failure Patients" , "[facility]" flyer and "Daily weight and symptom tracker".  Encouraged pt to review information.      Reviewed the following key points of HFT[facility] program with pt:   1.) Take your medications as directed.    2.) Weight yourself daily   3.) Follow low salt and limited fluid diet.    4.) Stop smoking and start exercising   5.) Go to your appointments and call your team.      Pt reminded to follow Symptom tracker and to call at the onset of symptoms according to tracker.     Reviewed plan for follow up once discharged to include phone calls, in person and virtual visits to assist pt optimizing their heart failure medication regimen and encouraging healthy lifestyle modifications.  Reminded pt that program will assist them over the next 4-6 weeks and then patient will be transferred to long term care provider .  Reminded pt how to contact HFT[facility] navigator via phone and or via [employer].     Pt given appointment or instructed appointment will be printed on hospital discharge paperwork.     Pt also reminded RN will call 48-72 hours after discharge to check on them.     PT verbalize read back of information given.  Encouraged pt and family to read over information often and contact team with any questions or concerns.       "

## 2022-02-22 NOTE — FIRST PROVIDER EVALUATION
Emergency Department TeleTriage Encounter Note      CHIEF COMPLAINT    Chief Complaint   Patient presents with    Shortness of Breath     Taking lasix, cough       VITAL SIGNS   Initial Vitals [02/21/22 1831]   BP Pulse Resp Temp SpO2   (!) 219/91 84 (!) 24 -- 95 %      MAP       --            ALLERGIES    Review of patient's allergies indicates:   Allergen Reactions    Iodine and iodide containing products Hives    Nifedipine      weakness       PROVIDER TRIAGE NOTE  This is a teletriage evaluation of a 81 y.o. female presenting to the ED with c/o cough and SOB.  Pt states increased weakness today.        All ED beds are full at present; patient notified of this status.  Patient seen and medically screened by Nurse Practitioner via teletriage. Orders initiated at triage to expedite care.  Patient is stable to return to the waiting room and will be placed in an ED bed when available.  Care will be transferred to an alternate provider when patient has been placed in an Exam Room from the [facility] for physical exam, additional orders, and disposition.          ORDERS  Labs Reviewed   CBC W/ AUTO DIFFERENTIAL   COMPREHENSIVE METABOLIC PANEL   TROPONIN I   B-TYPE NATRIURETIC PEPTIDE   SARS-COV-2 RDRP GENE       ED Orders (720h ago, onward)    Start Ordered     Status Ordering Provider    02/21/22 1927 02/21/22 1926  Confirm Patient is not Eligible for Congestive Heart Failure Pathway  Until discontinued         Ordered MARK AMAYA    02/21/22 1927 02/21/22 1926  Vital signs  Every 30 min         Ordered MARK AMAYA    02/21/22 1927 02/21/22 1926  Cardiac Monitoring - Adult  Continuous        Comments: Notify Physician If:    Ordered MARK AMAYA    02/21/22 1927 02/21/22 1926  Pulse Oximetry Continuous  Continuous         Ordered MARK AMAYA LRandi    02/21/22 1927 02/21/22 1926  Insert peripheral IV  Once         Ordered MARK AMAYA LRandi    02/21/22 1927 02/21/22 1926  CBC auto differential  STAT          Ordered MARK AMAYA.    02/21/22 1927 02/21/22 1926  Comprehensive metabolic panel  STAT         Ordered MARK AMAYA.    02/21/22 1927 02/21/22 1926  Troponin I  STAT         Ordered MARK AMAYA.    02/21/22 1927 02/21/22 1926  Brain natriuretic peptide  STAT         Ordered MARK AMAYA.    02/21/22 1927 02/21/22 1926  EKG 12-lead  Once         Ordered MARK AMAYA.    02/21/22 1927 02/21/22 1926  X-Ray Chest AP Portable  1 time imaging         Ordered MARK AMAYA.    02/21/22 1835 02/21/22 1834  EKG 12-lead  Once         Completed by WINNIE SCHOFIELD on 2/21/2022 at  6:42 PM KEYONA HORN    02/21/22 1835 02/21/22 1834  POCT COVID-19 Rapid Screening  Once         Acknowledged KEYONA HORN            Virtual Visit Note: The provider triage portion of this emergency department evaluation and documentation was performed via [employer], a HIPAA-compliant telemedicine application, in concert with a tele-presenter in the room. A face to face patient evaluation with one of my colleagues will occur once the patient is placed in an emergency department room.      DISCLAIMER: This note was prepared with [employer] voice recognition transcription software. Garbled syntax, mangled pronouns, and other bizarre constructions may be attributed to that software system.

## 2022-02-22 NOTE — ASSESSMENT & PLAN NOTE
-Initially hypertensive, improved with home medications.  -Continue coreg 25mg BID, cardizem 240mg daily, hydralazine 100mg TID, ibersartan 300mg qhs.

## 2022-02-23 PROBLEM — N18.9 ACUTE KIDNEY INJURY SUPERIMPOSED ON CKD: Status: ACTIVE | Noted: 2019-12-07

## 2022-02-23 LAB
ANION GAP SERPL CALC-SCNC: 13 MMOL/L (ref 8–16)
BUN SERPL-MCNC: 47 MG/DL (ref 8–23)
CALCIUM SERPL-MCNC: 8.5 MG/DL (ref 8.7–10.5)
CHLORIDE SERPL-SCNC: 98 MMOL/L (ref 95–110)
CO2 SERPL-SCNC: 21 MMOL/L (ref 23–29)
CREAT SERPL-MCNC: 2.9 MG/DL (ref 0.5–1.4)
EST. GFR  (AFRICAN AMERICAN): 16.7 ML/MIN/1.73 M^2
EST. GFR  (NON AFRICAN AMERICAN): 14.5 ML/MIN/1.73 M^2
GLUCOSE SERPL-MCNC: 293 MG/DL (ref 70–110)
POCT GLUCOSE: 311 MG/DL (ref 70–110)
POCT GLUCOSE: 313 MG/DL (ref 70–110)
POCT GLUCOSE: 348 MG/DL (ref 70–110)
POCT GLUCOSE: 370 MG/DL (ref 70–110)
POCT GLUCOSE: 408 MG/DL (ref 70–110)
POTASSIUM SERPL-SCNC: 4.1 MMOL/L (ref 3.5–5.1)
SODIUM SERPL-SCNC: 132 MMOL/L (ref 136–145)

## 2022-02-23 PROCEDURE — 99900035 HC TECH TIME PER 15 MIN (STAT)

## 2022-02-23 PROCEDURE — 20600001 HC STEP DOWN PRIVATE ROOM

## 2022-02-23 PROCEDURE — 25000003 PHARM REV CODE 250: Performed by: NURSE PRACTITIONER

## 2022-02-23 PROCEDURE — 36415 COLL VENOUS BLD VENIPUNCTURE: CPT | Performed by: NURSE PRACTITIONER

## 2022-02-23 PROCEDURE — 25000242 PHARM REV CODE 250 ALT 637 W/ HCPCS: Performed by: PHYSICIAN ASSISTANT

## 2022-02-23 PROCEDURE — 63700000 PHARM REV CODE 250 ALT 637 W/O HCPCS: Performed by: PHYSICIAN ASSISTANT

## 2022-02-23 PROCEDURE — 63600175 PHARM REV CODE 636 W HCPCS: Performed by: PHYSICIAN ASSISTANT

## 2022-02-23 PROCEDURE — 25000242 PHARM REV CODE 250 ALT 637 W/ HCPCS: Performed by: NURSE PRACTITIONER

## 2022-02-23 PROCEDURE — 99233 PR SUBSEQUENT HOSPITAL CARE,LEVL III: ICD-10-PCS | Mod: ,,, | Performed by: PHYSICIAN ASSISTANT

## 2022-02-23 PROCEDURE — 94640 AIRWAY INHALATION TREATMENT: CPT

## 2022-02-23 PROCEDURE — 63600175 PHARM REV CODE 636 W HCPCS: Performed by: NURSE PRACTITIONER

## 2022-02-23 PROCEDURE — 94761 N-INVAS EAR/PLS OXIMETRY MLT: CPT

## 2022-02-23 PROCEDURE — 25000003 PHARM REV CODE 250: Performed by: PHYSICIAN ASSISTANT

## 2022-02-23 PROCEDURE — 99233 SBSQ HOSP IP/OBS HIGH 50: CPT | Mod: ,,, | Performed by: PHYSICIAN ASSISTANT

## 2022-02-23 PROCEDURE — 80048 BASIC METABOLIC PNL TOTAL CA: CPT | Performed by: NURSE PRACTITIONER

## 2022-02-23 PROCEDURE — 27000221 HC OXYGEN, UP TO 24 HOURS

## 2022-02-23 PROCEDURE — 94760 N-INVAS EAR/PLS OXIMETRY 1: CPT

## 2022-02-23 RX ORDER — INSULIN ASPART 100 [IU]/ML
5 INJECTION, SOLUTION INTRAVENOUS; SUBCUTANEOUS
Status: DISCONTINUED | OUTPATIENT
Start: 2022-02-23 | End: 2022-02-24

## 2022-02-23 RX ORDER — IBUPROFEN 200 MG
24 TABLET ORAL
Status: DISCONTINUED | OUTPATIENT
Start: 2022-02-23 | End: 2022-02-23

## 2022-02-23 RX ORDER — INSULIN ASPART 100 [IU]/ML
0-5 INJECTION, SOLUTION INTRAVENOUS; SUBCUTANEOUS
Status: DISCONTINUED | OUTPATIENT
Start: 2022-02-23 | End: 2022-03-01 | Stop reason: HOSPADM

## 2022-02-23 RX ORDER — GLUCAGON 1 MG
1 KIT INJECTION
Status: DISCONTINUED | OUTPATIENT
Start: 2022-02-23 | End: 2022-02-23

## 2022-02-23 RX ORDER — IBUPROFEN 200 MG
16 TABLET ORAL
Status: DISCONTINUED | OUTPATIENT
Start: 2022-02-23 | End: 2022-02-23

## 2022-02-23 RX ADMIN — INSULIN ASPART 5 UNITS: 100 INJECTION, SOLUTION INTRAVENOUS; SUBCUTANEOUS at 05:02

## 2022-02-23 RX ADMIN — CARVEDILOL 25 MG: 25 TABLET, FILM COATED ORAL at 08:02

## 2022-02-23 RX ADMIN — MONTELUKAST 10 MG: 10 TABLET, FILM COATED ORAL at 08:02

## 2022-02-23 RX ADMIN — CETIRIZINE HYDROCHLORIDE 10 MG: 5 TABLET ORAL at 09:02

## 2022-02-23 RX ADMIN — HEPARIN SODIUM 5000 UNITS: 5000 INJECTION INTRAVENOUS; SUBCUTANEOUS at 01:02

## 2022-02-23 RX ADMIN — PRAVASTATIN SODIUM 40 MG: 40 TABLET ORAL at 09:02

## 2022-02-23 RX ADMIN — HYDRALAZINE HYDROCHLORIDE 100 MG: 25 TABLET, FILM COATED ORAL at 09:02

## 2022-02-23 RX ADMIN — IPRATROPIUM BROMIDE AND ALBUTEROL SULFATE 3 ML: 2.5; .5 SOLUTION RESPIRATORY (INHALATION) at 07:02

## 2022-02-23 RX ADMIN — INSULIN DETEMIR 18 UNITS: 100 INJECTION, SOLUTION SUBCUTANEOUS at 09:02

## 2022-02-23 RX ADMIN — HYDRALAZINE HYDROCHLORIDE 100 MG: 25 TABLET, FILM COATED ORAL at 04:02

## 2022-02-23 RX ADMIN — DILTIAZEM HYDROCHLORIDE 240 MG: 120 CAPSULE, COATED, EXTENDED RELEASE ORAL at 09:02

## 2022-02-23 RX ADMIN — CHOLECALCIFEROL TAB 25 MCG (1000 UNIT) 2000 UNITS: 25 TAB at 09:02

## 2022-02-23 RX ADMIN — INSULIN ASPART 4 UNITS: 100 INJECTION, SOLUTION INTRAVENOUS; SUBCUTANEOUS at 12:02

## 2022-02-23 RX ADMIN — FLUTICASONE FUROATE AND VILANTEROL TRIFENATATE 1 PUFF: 100; 25 POWDER RESPIRATORY (INHALATION) at 07:02

## 2022-02-23 RX ADMIN — AZITHROMYCIN MONOHYDRATE 250 MG: 250 TABLET ORAL at 09:02

## 2022-02-23 RX ADMIN — HEPARIN SODIUM 5000 UNITS: 5000 INJECTION INTRAVENOUS; SUBCUTANEOUS at 05:02

## 2022-02-23 RX ADMIN — CARVEDILOL 25 MG: 25 TABLET, FILM COATED ORAL at 09:02

## 2022-02-23 RX ADMIN — HEPARIN SODIUM 5000 UNITS: 5000 INJECTION INTRAVENOUS; SUBCUTANEOUS at 10:02

## 2022-02-23 RX ADMIN — HYDRALAZINE HYDROCHLORIDE 100 MG: 25 TABLET, FILM COATED ORAL at 08:02

## 2022-02-23 RX ADMIN — PREDNISONE 40 MG: 20 TABLET ORAL at 09:02

## 2022-02-23 RX ADMIN — FLUTICASONE PROPIONATE 100 MCG: 50 SPRAY, METERED NASAL at 09:02

## 2022-02-23 RX ADMIN — IPRATROPIUM BROMIDE AND ALBUTEROL SULFATE 3 ML: 2.5; .5 SOLUTION RESPIRATORY (INHALATION) at 03:02

## 2022-02-23 RX ADMIN — IPRATROPIUM BROMIDE AND ALBUTEROL SULFATE 3 ML: 2.5; .5 SOLUTION RESPIRATORY (INHALATION) at 11:02

## 2022-02-23 RX ADMIN — Medication 250 MG: at 09:02

## 2022-02-23 RX ADMIN — GUAIFENESIN AND DEXTROMETHORPHAN HYDROBROMIDE 1 TABLET: 600; 30 TABLET, EXTENDED RELEASE ORAL at 09:02

## 2022-02-23 RX ADMIN — INSULIN ASPART 3 UNITS: 100 INJECTION, SOLUTION INTRAVENOUS; SUBCUTANEOUS at 08:02

## 2022-02-23 RX ADMIN — IPRATROPIUM BROMIDE AND ALBUTEROL SULFATE 3 ML: 2.5; .5 SOLUTION RESPIRATORY (INHALATION) at 09:02

## 2022-02-23 RX ADMIN — GUAIFENESIN AND DEXTROMETHORPHAN HYDROBROMIDE 1 TABLET: 600; 30 TABLET, EXTENDED RELEASE ORAL at 08:02

## 2022-02-23 NOTE — ASSESSMENT & PLAN NOTE
Worsening SOB with productive cough with green sputum  - Afebrile without leukocytosis.   -Continue supplemental oxygen as needed, currently on 1L NC  - started azithromycin X 5 days, prednisone 40 mg daily X 5 days  - scheduled duonebs [id number]  - started mucinex-DM BID, tessalon perles TID PRN cough  -Continue daily inhalers  - monitor for hyperglycemia while on steroids

## 2022-02-23 NOTE — ASSESSMENT & PLAN NOTE
- Cr 2.3>2.9, baseline ~2  - suspect prerenal in setting of diuresis  - holding home lasix/ losartan  - avoid nephrotoxic agents  - monitor with daily labs  - Retroperitoneal ultrasound and urine studies if no improvement

## 2022-02-23 NOTE — PROGRESS NOTES
Nagi Aggarwal - Oncology ([facility])  [facility] Medicine  Progress Note    Patient Name: Myesha Quinones  MRN: [id number]  Patient Class: IP- Inpatient   Admission Date: 2/21/2022  Length of Stay: 1 days  Attending Physician: Jose Manuel Correia MD  Primary Care Provider: Catrachita Rothman MD        Subjective:     Principal Problem:Chronic obstructive pulmonary disease with acute exacerbation        HPI:  Myesha Quinones is a 81 y.o. female with a PMHx of CAD, ischemic cardiomyopathy with LBBB s/p biventricular ICD, CHF, CKD4, IDDM, breast cancer, HTN, asthma, COPD, and HLD who presents to the ED with complaints of shortness of breath. The patient reports shortness of breath beginning about 12 days ago. She reports her shortness of breath is worse with exertion. She was seen in cardiology clinic and started on lasix 20mg daily and reports weight loss of 5lbs and significant improvement of BLE edema. However, her dyspnea has been worsening. She reports a chronic cough that has become productive over the last week, noting green sputum. Endorses associated fatigue and generalized weakness. Denies orthopnea, PND, chest pain, palpitations, fever, chills, nausea, vomiting, diarrhea, or dysuria.    In the ED, patient found to be hypoxic requiring 2L O2 via nasal cannula. Patient also hypertensive and mildly tachycardic, afebrile. CBC unremarkable. CMP with baseline CKD. , improved from previous. Troponin WNL. Lactate and procal in process. UA negative for infection. CXR Stable right pleural fluid. Stable bilateral reticular opacities suggestive of pulmonary edema or infection. EKG with ventricularly paced rhythm, rate 84. The patient received oral hydralazine, duo neb treatment, and IV lasix in the ED.      Overview/Hospital Course:  Mrs. Quinones was admitted to observation for CHF and COPD exacerbation. Afebrile without leukocytosis. Started on Lasix IV 20 mg BIDand scheduled duonebs, prednisone, and  azithromycin.      Interval History: Patient reports some improvement in SOB, reports continued wheezing. LE extremity swelling improving. 92% on  RA. Cr increased from 2.3> 2.9. Home lasix and losartan held. Continue COPD treatment. Insulin titrated for hyperglycemia in setting of steroid use.     Review of Systems   Constitutional:  Positive for fatigue. Negative for chills and fever.   HENT:  Negative for congestion and rhinorrhea.    Respiratory:  Positive for cough, shortness of breath and wheezing. Negative for chest tightness.    Cardiovascular:  Positive for leg swelling. Negative for chest pain and palpitations.   Gastrointestinal:  Negative for abdominal distention, abdominal pain, diarrhea and nausea.   Genitourinary:  Negative for difficulty urinating, dysuria, flank pain and hematuria.   Musculoskeletal:  Negative for arthralgias and back pain.   Neurological:  Negative for dizziness, numbness and headaches.   Psychiatric/Behavioral:  Negative for agitation and behavioral problems.    Objective:     Vital Signs (Most Recent):  Temp: 98 °F (36.7 °C) (02/23/22 1516)  Pulse: 72 (02/23/22 1516)  Resp: 17 (02/23/22 1516)  BP: (!) 144/65 (02/23/22 1516)  SpO2: (!) 92 % (02/23/22 1516)   Vital Signs (24h Range):  Temp:  [97 °F (36.1 °C)-98 °F (36.7 °C)] 98 °F (36.7 °C)  Pulse:  [59-84] 72  Resp:  [15-22] 17  SpO2:  [92 %-98 %] 92 %  BP: (110-157)/(56-69) 144/65     Weight: 59.2 kg (130 lb 10 oz)  Body mass index is 23.14 kg/m².    Intake/Output Summary (Last 24 hours) at 2/23/2022 1538  Last data filed at 2/23/2022 1200  Gross per 24 hour   Intake 440 ml   Output 550 ml   Net -110 ml      Physical Exam  Vitals and nursing note reviewed.   Constitutional:       General: She is not in acute distress.     Appearance: Normal appearance. She is not ill-appearing.   HENT:      Head: Normocephalic and atraumatic.      Nose: Nose normal. No congestion.      Mouth/Throat:      Mouth: Mucous membranes are moist.       Pharynx: Oropharynx is clear.   Eyes:      Extraocular Movements: Extraocular movements intact.      Conjunctiva/sclera: Conjunctivae normal.      Pupils: Pupils are equal, round, and reactive to light.   Cardiovascular:      Rate and Rhythm: Normal rate and regular rhythm.      Pulses: Normal pulses.      Heart sounds: Normal heart sounds. No murmur heard.  Pulmonary:      Breath sounds: Wheezing (inspiratory and expiratory throughout, improving) and rales (faint bibasilar) present.      Comments: 92% on RA  Chest:      Chest wall: No tenderness.   Abdominal:      General: Abdomen is flat. Bowel sounds are normal. There is no distension.      Palpations: Abdomen is soft.      Tenderness: There is no abdominal tenderness.   Musculoskeletal:         General: Normal range of motion.      Right lower leg: Edema (faint) present.      Left lower leg: Edema (1+) present.      Comments: Chronic RUE swelling from prior mastectomy    Skin:     General: Skin is warm and dry.      Capillary Refill: Capillary refill takes less than 2 seconds.   Neurological:      General: No focal deficit present.      Mental Status: She is alert and oriented to person, place, and time. Mental status is at baseline.   Psychiatric:         Mood and Affect: Mood normal.         Behavior: Behavior normal.       Significant Labs: All pertinent labs within the past 24 hours have been reviewed.  BMP:   Recent Labs   Lab 02/22/22  0519 02/23/22  0549   GLU  --  293*   NA  --  132*   K  --  4.1   CL  --  98   CO2  --  21*   BUN  --  47*   CREATININE  --  2.9*   CALCIUM  --  8.5*   MG 1.9  --      CBC:   Recent Labs   Lab 02/21/22  1935 02/22/22  0519   WBC 8.89 9.77   HGB 10.9* 10.4*   HCT 35.4* 34.5*    250       Significant Imaging: I have reviewed all pertinent imaging results/findings within the past 24 hours.      Assessment/Plan:      * Chronic obstructive pulmonary disease with acute exacerbation  Worsening SOB with productive cough with  green sputum  - Afebrile without leukocytosis.   -Continue supplemental oxygen as needed, currently on 1L NC  - started azithromycin X 5 days, prednisone 40 mg daily X 5 days  - scheduled duonebs [id number]  - started mucinex-DM BID, tessalon perles TID PRN cough  -Continue daily inhalers  - monitor for hyperglycemia while on steroids    Obstructive sleep apnea  -Continue CPAP qhs    Acute on chronic combined systolic and diastolic heart failure  Nonischemic cardiomyopathy  Resolving  Patient is identified as having Combined Systolic and Diastolic heart failure that is Acute on Chronic. CHF is currently uncontrolled due to volume overload due to: Continued edema of extremities and JVD, Rales/crackles on pulmonary exam and Pulmonary edema/pleural effusion on CXR. Latest ECHO performed and demonstrates- Results for orders placed during the hospital encounter of 03/23/21    Echo Color Flow Doppler? Yes    Interpretation Summary  · The left ventricle is normal in size with eccentric hypertrophy and low normal systolic function. The estimated ejection fraction is 50%  · There are segmental left ventricular wall motion abnormalities.  · Normal left ventricular diastolic function.  · Normal right ventricular size with normal right ventricular systolic function.  · Mild tricuspid regurgitation.  · The estimated PA systolic pressure is 44 mmHg.  · There is pulmonary hypertension.  · Normal central venous pressure (3 mmHg).  . Continue Beta Blocker ACE/ARB Furosemide Nitrate/Vasodilator and monitor clinical status closely. Monitor on telemetry. Patient is on CHF pathway.  Monitor strict Is&Os and daily weights.  Place on fluid restriction of 1.5 L. Continue to stress to patient importance of self efficacy and  on diet for CHF. 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   Last A1c reviewed-   Lab Results   Component Value Date    HGBA1C 5.9 (H) 02/22/2022     Most recent fingerstick glucose reviewed-   Recent Labs   Lab 02/22/22  0834 02/22/22  1238   POCTGLUCOSE 130* 177*     Current correctional scale  Low  Maintain anti-hyperglycemic dose as follows-   Antihyperglycemics (From admission, onward)            Start     Stop Route Frequency Ordered    02/22/22 0900  insulin detemir U-100 pen 18 Units         -- SubQ Daily 02/22/22 0323      Accuchecks AC/HS  Diabetic/cardiac diet  Monitor closely while on prednisone    Hyperlipidemia associated with type 2 diabetes mellitus   Patient is chronically on statin.will continue for now. Monitor clinically. Last LDL was   Lab Results   Component Value Date    LDLCALC 43.8 (L) 02/08/2022       CKD (chronic kidney disease) stage 4, GFR 15-29 ml/min  - see VIRGILIO    Acute hypoxemic respiratory failure  Patient with acute hypoxemic respiratory failure likely secondary to CHF exacerbation + COPD exacerbation. Patient reporting worsening cough for the last week with green sputum. CXR with stable right pleural fluid. Stable bilateral reticular opacities suggestive of pulmonary edema or infection.    -Procalcitonin and lactic WNL. Do not suspect PNA  -CTX/ doxy switched to azithromycin for COPD exacerbation + prednisone, scheduled breathing treatments  - diuresis held in setting of VIRGILIO  - wean oxygen as tolerated, currently on RA    Acute kidney injury superimposed on CKD  - Cr 2.3>2.9, baseline ~2  - suspect prerenal in setting of diuresis  - holding home lasix/ losartan  - avoid nephrotoxic agents  - monitor with daily labs  - Retroperitoneal ultrasound and urine studies if no improvement      Iron deficiency anemia  Patient's anemia is currently  controlled.   Current CBC reviewed-   Lab Results   Component Value Date    HGB 10.9 (L) 02/21/2022    HCT 35.4 (L) 02/21/2022     Monitor serial CBC and transfuse if patient becomes hemodynamically unstable, symptomatic or H/H drops below 7/21.     Hypertension associated with diabetes  -Initially hypertensive, improved with home medications.  -Continue coreg 25mg BID, cardizem 240mg daily, hydralazine 100mg TID, ibersartan 300mg qhs.  - cardiac diet    Biventricular ICD (implantable cardioverter-defibrillator) in place  Seen on CXR  - stable    Asthma, chronic  -Continue montelukast  -PRN duo nebs      VTE Risk Mitigation (From admission, onward)         Ordered     heparin (porcine) injection 5,000 Units  Every 8 hours         02/22/22 0335     IP VTE HIGH RISK PATIENT  Once         02/22/22 0335     Place sequential compression device  Until discontinued         02/22/22 0335                Discharge Planning   ALIX: 2/24/2022     Code Status: Full Code   Is the patient medically ready for discharge?: No    Reason for patient still in hospital (select all that apply): Patient trending condition, Laboratory test, Treatment and Imaging  Discharge Plan A: Home with family, Home Health                  Latoya Luna PA-C  Department of Hospital Medicine   Nagi aiden - Oncology ([facility])

## 2022-02-23 NOTE — PLAN OF CARE
Plan of care reviewed. Patient is oriented X4. Rested comfortably in bed; Repositioned as needed. Telemetry in place. Oxygen remains at 2 lpm vis NC. PIV saline locked. Complained of H/A; PRN tylenol administered. Received IV lasix. Pure wick in place. Accu check . Antibiotics continued. Remained afebrile. BiPAP worn during sleeping hours. All questions and concerns addressed. Family remains at bedside. All safety precautions in place. Remains free of injury. Patient is stable. Will continue to monitor.

## 2022-02-23 NOTE — SUBJECTIVE & OBJECTIVE
Interval History: Patient reports some improvement in SOB, reports continued wheezing. LE extremity swelling improving. 92% on  RA. Cr increased from 2.3> 2.9. Home lasix and losartan held. Continue COPD treatment. Insulin titrated for hyperglycemia in setting of steroid use.     Review of Systems   Constitutional:  Positive for fatigue. Negative for chills and fever.   HENT:  Negative for congestion and rhinorrhea.    Respiratory:  Positive for cough, shortness of breath and wheezing. Negative for chest tightness.    Cardiovascular:  Positive for leg swelling. Negative for chest pain and palpitations.   Gastrointestinal:  Negative for abdominal distention, abdominal pain, diarrhea and nausea.   Genitourinary:  Negative for difficulty urinating, dysuria, flank pain and hematuria.   Musculoskeletal:  Negative for arthralgias and back pain.   Neurological:  Negative for dizziness, numbness and headaches.   Psychiatric/Behavioral:  Negative for agitation and behavioral problems.    Objective:     Vital Signs (Most Recent):  Temp: 98 °F (36.7 °C) (02/23/22 1516)  Pulse: 72 (02/23/22 1516)  Resp: 17 (02/23/22 1516)  BP: (!) 144/65 (02/23/22 1516)  SpO2: (!) 92 % (02/23/22 1516)   Vital Signs (24h Range):  Temp:  [97 °F (36.1 °C)-98 °F (36.7 °C)] 98 °F (36.7 °C)  Pulse:  [59-84] 72  Resp:  [15-22] 17  SpO2:  [92 %-98 %] 92 %  BP: (110-157)/(56-69) 144/65     Weight: 59.2 kg (130 lb 10 oz)  Body mass index is 23.14 kg/m².    Intake/Output Summary (Last 24 hours) at 2/23/2022 1538  Last data filed at 2/23/2022 1200  Gross per 24 hour   Intake 440 ml   Output 550 ml   Net -110 ml      Physical Exam  Vitals and nursing note reviewed.   Constitutional:       General: She is not in acute distress.     Appearance: Normal appearance. She is not ill-appearing.   HENT:      Head: Normocephalic and atraumatic.      Nose: Nose normal. No congestion.      Mouth/Throat:      Mouth: Mucous membranes are moist.      Pharynx: Oropharynx  is clear.   Eyes:      Extraocular Movements: Extraocular movements intact.      Conjunctiva/sclera: Conjunctivae normal.      Pupils: Pupils are equal, round, and reactive to light.   Cardiovascular:      Rate and Rhythm: Normal rate and regular rhythm.      Pulses: Normal pulses.      Heart sounds: Normal heart sounds. No murmur heard.  Pulmonary:      Breath sounds: Wheezing (inspiratory and expiratory throughout, improving) and rales (faint bibasilar) present.      Comments: 92% on RA  Chest:      Chest wall: No tenderness.   Abdominal:      General: Abdomen is flat. Bowel sounds are normal. There is no distension.      Palpations: Abdomen is soft.      Tenderness: There is no abdominal tenderness.   Musculoskeletal:         General: Normal range of motion.      Right lower leg: Edema (faint) present.      Left lower leg: Edema (1+) present.      Comments: Chronic RUE swelling from prior mastectomy    Skin:     General: Skin is warm and dry.      Capillary Refill: Capillary refill takes less than 2 seconds.   Neurological:      General: No focal deficit present.      Mental Status: She is alert and oriented to person, place, and time. Mental status is at baseline.   Psychiatric:         Mood and Affect: Mood normal.         Behavior: Behavior normal.       Significant Labs: All pertinent labs within the past 24 hours have been reviewed.  BMP:   Recent Labs   Lab 02/22/22  0519 02/23/22  0549   GLU  --  293*   NA  --  132*   K  --  4.1   CL  --  98   CO2  --  21*   BUN  --  47*   CREATININE  --  2.9*   CALCIUM  --  8.5*   MG 1.9  --      CBC:   Recent Labs   Lab 02/21/22  1935 02/22/22  0519   WBC 8.89 9.77   HGB 10.9* 10.4*   HCT 35.4* 34.5*    250       Significant Imaging: I have reviewed all pertinent imaging results/findings within the past 24 hours.

## 2022-02-23 NOTE — ASSESSMENT & PLAN NOTE
Nonischemic cardiomyopathy  Resolving  Patient is identified as having Combined Systolic and Diastolic heart failure that is Acute on Chronic. CHF is currently uncontrolled due to volume overload due to: Continued edema of extremities and JVD, Rales/crackles on pulmonary exam and Pulmonary edema/pleural effusion on CXR. Latest ECHO performed and demonstrates- Results for orders placed during the hospital encounter of 03/23/21    Echo Color Flow Doppler? Yes    Interpretation Summary  · The left ventricle is normal in size with eccentric hypertrophy and low normal systolic function. The estimated ejection fraction is 50%  · There are segmental left ventricular wall motion abnormalities.  · Normal left ventricular diastolic function.  · Normal right ventricular size with normal right ventricular systolic function.  · Mild tricuspid regurgitation.  · The estimated PA systolic pressure is 44 mmHg.  · There is pulmonary hypertension.  · Normal central venous pressure (3 mmHg).  . Continue Beta Blocker ACE/ARB Furosemide Nitrate/Vasodilator and monitor clinical status closely. Monitor on telemetry. Patient is on CHF pathway.  Monitor strict Is&Os and daily weights.  Place on fluid restriction of 1.5 L. Continue to stress to patient importance of self efficacy and  on diet for CHF. Last BNP reviewed- and noted below   Recent Labs   Lab 02/21/22 1935   *   -ECHO ordered  - home lasix 20 mg daily  - hospital lasix 20mg IV BID--> held in setting of VIRGILIO

## 2022-02-23 NOTE — ASSESSMENT & PLAN NOTE
Patient with acute hypoxemic respiratory failure likely secondary to CHF exacerbation + COPD exacerbation. Patient reporting worsening cough for the last week with green sputum. CXR with stable right pleural fluid. Stable bilateral reticular opacities suggestive of pulmonary edema or infection.    -Procalcitonin and lactic WNL. Do not suspect PNA  -CTX/ doxy switched to azithromycin for COPD exacerbation + prednisone, scheduled breathing treatments  - diuresis held in setting of VIRGILIO  - wean oxygen as tolerated, currently on RA

## 2022-02-24 PROBLEM — E87.20 METABOLIC ACIDOSIS: Status: ACTIVE | Noted: 2022-02-24

## 2022-02-24 PROBLEM — E11.65 TYPE 2 DIABETES MELLITUS WITH HYPERGLYCEMIA, WITH LONG-TERM CURRENT USE OF INSULIN: Status: ACTIVE | Noted: 2018-02-22

## 2022-02-24 PROBLEM — N18.4 ACUTE RENAL FAILURE SUPERIMPOSED ON STAGE 4 CHRONIC KIDNEY DISEASE: Status: ACTIVE | Noted: 2019-12-07

## 2022-02-24 PROBLEM — D64.9 ANEMIA: Status: ACTIVE | Noted: 2022-02-24

## 2022-02-24 PROBLEM — J96.02 ACUTE RESPIRATORY FAILURE WITH HYPOXIA AND HYPERCARBIA: Status: ACTIVE | Noted: 2019-12-19

## 2022-02-24 PROBLEM — E87.1 ACUTE HYPONATREMIA: Status: ACTIVE | Noted: 2022-02-24

## 2022-02-24 LAB
ANION GAP SERPL CALC-SCNC: 13 MMOL/L (ref 8–16)
ANION GAP SERPL CALC-SCNC: 13 MMOL/L (ref 8–16)
BASOPHILS # BLD AUTO: 0.02 K/UL (ref 0–0.2)
BASOPHILS NFR BLD: 0.2 % (ref 0–1.9)
BUN SERPL-MCNC: 64 MG/DL (ref 8–23)
BUN SERPL-MCNC: 69 MG/DL (ref 8–23)
CALCIUM SERPL-MCNC: 8.6 MG/DL (ref 8.7–10.5)
CALCIUM SERPL-MCNC: 8.6 MG/DL (ref 8.7–10.5)
CHLORIDE SERPL-SCNC: 93 MMOL/L (ref 95–110)
CHLORIDE SERPL-SCNC: 96 MMOL/L (ref 95–110)
CHLORIDE UR-SCNC: 45 MMOL/L (ref 25–200)
CK SERPL-CCNC: 43 U/L (ref 20–180)
CO2 SERPL-SCNC: 19 MMOL/L (ref 23–29)
CO2 SERPL-SCNC: 20 MMOL/L (ref 23–29)
CREAT SERPL-MCNC: 3.1 MG/DL (ref 0.5–1.4)
CREAT SERPL-MCNC: 3.5 MG/DL (ref 0.5–1.4)
CREAT UR-MCNC: 46 MG/DL (ref 15–325)
CREAT UR-MCNC: 46 MG/DL (ref 15–325)
DIFFERENTIAL METHOD: ABNORMAL
EOSINOPHIL # BLD AUTO: 0 K/UL (ref 0–0.5)
EOSINOPHIL NFR BLD: 0 % (ref 0–8)
EOSINOPHIL URNS QL WRIGHT STN: NORMAL
ERYTHROCYTE [DISTWIDTH] IN BLOOD BY AUTOMATED COUNT: 13.2 % (ref 11.5–14.5)
EST. GFR  (AFRICAN AMERICAN): 13.3 ML/MIN/1.73 M^2
EST. GFR  (AFRICAN AMERICAN): 15.4 ML/MIN/1.73 M^2
EST. GFR  (NON AFRICAN AMERICAN): 11.6 ML/MIN/1.73 M^2
EST. GFR  (NON AFRICAN AMERICAN): 13.4 ML/MIN/1.73 M^2
GLUCOSE SERPL-MCNC: 146 MG/DL (ref 70–110)
GLUCOSE SERPL-MCNC: 318 MG/DL (ref 70–110)
HCT VFR BLD AUTO: 28.7 % (ref 37–48.5)
HGB BLD-MCNC: 9 G/DL (ref 12–16)
IMM GRANULOCYTES # BLD AUTO: 0.06 K/UL (ref 0–0.04)
IMM GRANULOCYTES NFR BLD AUTO: 0.5 % (ref 0–0.5)
LYMPHOCYTES # BLD AUTO: 1.1 K/UL (ref 1–4.8)
LYMPHOCYTES NFR BLD: 8.7 % (ref 18–48)
MAGNESIUM SERPL-MCNC: 2 MG/DL (ref 1.6–2.6)
MCH RBC QN AUTO: 28.9 PG (ref 27–31)
MCHC RBC AUTO-ENTMCNC: 31.4 G/DL (ref 32–36)
MCV RBC AUTO: 92 FL (ref 82–98)
MONOCYTES # BLD AUTO: 0.6 K/UL (ref 0.3–1)
MONOCYTES NFR BLD: 4.9 % (ref 4–15)
NEUTROPHILS # BLD AUTO: 10.5 K/UL (ref 1.8–7.7)
NEUTROPHILS NFR BLD: 85.7 % (ref 38–73)
NRBC BLD-RTO: 0 /100 WBC
OSMOLALITY SERPL: 300 MOSM/KG (ref 275–295)
OSMOLALITY UR: 280 MOSM/KG (ref 50–1200)
PLATELET # BLD AUTO: 233 K/UL (ref 150–450)
PMV BLD AUTO: 11.1 FL (ref 9.2–12.9)
POCT GLUCOSE: 192 MG/DL (ref 70–110)
POCT GLUCOSE: 276 MG/DL (ref 70–110)
POCT GLUCOSE: 285 MG/DL (ref 70–110)
POCT GLUCOSE: 307 MG/DL (ref 70–110)
POCT GLUCOSE: 389 MG/DL (ref 70–110)
POTASSIUM SERPL-SCNC: 4.4 MMOL/L (ref 3.5–5.1)
POTASSIUM SERPL-SCNC: 4.9 MMOL/L (ref 3.5–5.1)
POTASSIUM UR-SCNC: 30 MMOL/L (ref 15–95)
PROT UR-MCNC: 312 MG/DL (ref 0–15)
PROT/CREAT UR: 6.78 MG/G{CREAT} (ref 0–0.2)
RBC # BLD AUTO: 3.11 M/UL (ref 4–5.4)
SODIUM SERPL-SCNC: 125 MMOL/L (ref 136–145)
SODIUM SERPL-SCNC: 129 MMOL/L (ref 136–145)
SODIUM UR-SCNC: 50 MMOL/L (ref 20–250)
SODIUM UR-SCNC: 50 MMOL/L (ref 20–250)
URATE SERPL-MCNC: 9.7 MG/DL (ref 2.4–5.7)
UUN UR-MCNC: 227 MG/DL (ref 140–1050)
WBC # BLD AUTO: 12.2 K/UL (ref 3.9–12.7)

## 2022-02-24 PROCEDURE — 20600001 HC STEP DOWN PRIVATE ROOM

## 2022-02-24 PROCEDURE — 25000242 PHARM REV CODE 250 ALT 637 W/ HCPCS: Performed by: PHYSICIAN ASSISTANT

## 2022-02-24 PROCEDURE — 94640 AIRWAY INHALATION TREATMENT: CPT

## 2022-02-24 PROCEDURE — 80048 BASIC METABOLIC PNL TOTAL CA: CPT | Mod: 91 | Performed by: PHYSICIAN ASSISTANT

## 2022-02-24 PROCEDURE — 87651 STREP A DNA AMP PROBE: CPT

## 2022-02-24 PROCEDURE — 87205 SMEAR GRAM STAIN: CPT | Performed by: PHYSICIAN ASSISTANT

## 2022-02-24 PROCEDURE — 36415 COLL VENOUS BLD VENIPUNCTURE: CPT | Performed by: PHYSICIAN ASSISTANT

## 2022-02-24 PROCEDURE — 85025 COMPLETE CBC W/AUTO DIFF WBC: CPT | Performed by: NURSE PRACTITIONER

## 2022-02-24 PROCEDURE — 30000890 MISCELLANEOUS SENDOUT TEST, NON-BLOOD: Mod: 59 | Performed by: PHYSICIAN ASSISTANT

## 2022-02-24 PROCEDURE — 94660 CPAP INITIATION&MGMT: CPT

## 2022-02-24 PROCEDURE — 82436 ASSAY OF URINE CHLORIDE: CPT | Performed by: PHYSICIAN ASSISTANT

## 2022-02-24 PROCEDURE — 87632 RESP VIRUS 6-11 TARGETS: CPT

## 2022-02-24 PROCEDURE — 63700000 PHARM REV CODE 250 ALT 637 W/O HCPCS: Performed by: PHYSICIAN ASSISTANT

## 2022-02-24 PROCEDURE — 63600175 PHARM REV CODE 636 W HCPCS: Performed by: NURSE PRACTITIONER

## 2022-02-24 PROCEDURE — 87632 RESP VIRUS 6-11 TARGETS: CPT | Mod: 59 | Performed by: PHYSICIAN ASSISTANT

## 2022-02-24 PROCEDURE — 25000003 PHARM REV CODE 250: Performed by: PHYSICIAN ASSISTANT

## 2022-02-24 PROCEDURE — 84540 ASSAY OF URINE/UREA-N: CPT | Performed by: PHYSICIAN ASSISTANT

## 2022-02-24 PROCEDURE — 99900035 HC TECH TIME PER 15 MIN (STAT)

## 2022-02-24 PROCEDURE — 84156 ASSAY OF PROTEIN URINE: CPT | Performed by: PHYSICIAN ASSISTANT

## 2022-02-24 PROCEDURE — 84300 ASSAY OF URINE SODIUM: CPT | Performed by: PHYSICIAN ASSISTANT

## 2022-02-24 PROCEDURE — 80048 BASIC METABOLIC PNL TOTAL CA: CPT | Performed by: NURSE PRACTITIONER

## 2022-02-24 PROCEDURE — 25000242 PHARM REV CODE 250 ALT 637 W/ HCPCS: Performed by: NURSE PRACTITIONER

## 2022-02-24 PROCEDURE — 63600175 PHARM REV CODE 636 W HCPCS: Performed by: PHYSICIAN ASSISTANT

## 2022-02-24 PROCEDURE — 83935 ASSAY OF URINE OSMOLALITY: CPT | Performed by: PHYSICIAN ASSISTANT

## 2022-02-24 PROCEDURE — 27000221 HC OXYGEN, UP TO 24 HOURS

## 2022-02-24 PROCEDURE — 99233 PR SUBSEQUENT HOSPITAL CARE,LEVL III: ICD-10-PCS | Mod: ,,, | Performed by: PHYSICIAN ASSISTANT

## 2022-02-24 PROCEDURE — 84550 ASSAY OF BLOOD/URIC ACID: CPT | Performed by: PHYSICIAN ASSISTANT

## 2022-02-24 PROCEDURE — 87486 CHLMYD PNEUM DNA AMP PROBE: CPT

## 2022-02-24 PROCEDURE — 30000890 HC MISC. SEND OUT TEST

## 2022-02-24 PROCEDURE — 36415 COLL VENOUS BLD VENIPUNCTURE: CPT | Performed by: NURSE PRACTITIONER

## 2022-02-24 PROCEDURE — 94761 N-INVAS EAR/PLS OXIMETRY MLT: CPT

## 2022-02-24 PROCEDURE — 82550 ASSAY OF CK (CPK): CPT | Performed by: PHYSICIAN ASSISTANT

## 2022-02-24 PROCEDURE — 87581 M.PNEUMON DNA AMP PROBE: CPT

## 2022-02-24 PROCEDURE — 99233 SBSQ HOSP IP/OBS HIGH 50: CPT | Mod: ,,, | Performed by: PHYSICIAN ASSISTANT

## 2022-02-24 PROCEDURE — 84133 ASSAY OF URINE POTASSIUM: CPT | Performed by: PHYSICIAN ASSISTANT

## 2022-02-24 PROCEDURE — 83735 ASSAY OF MAGNESIUM: CPT | Performed by: NURSE PRACTITIONER

## 2022-02-24 PROCEDURE — 25000003 PHARM REV CODE 250: Performed by: NURSE PRACTITIONER

## 2022-02-24 PROCEDURE — 83930 ASSAY OF BLOOD OSMOLALITY: CPT | Performed by: PHYSICIAN ASSISTANT

## 2022-02-24 RX ORDER — INSULIN ASPART 100 [IU]/ML
3 INJECTION, SOLUTION INTRAVENOUS; SUBCUTANEOUS
Status: DISCONTINUED | OUTPATIENT
Start: 2022-02-24 | End: 2022-02-26

## 2022-02-24 RX ORDER — DOXYCYCLINE HYCLATE 100 MG
100 TABLET ORAL EVERY 12 HOURS
Status: DISCONTINUED | OUTPATIENT
Start: 2022-02-24 | End: 2022-02-28

## 2022-02-24 RX ADMIN — GUAIFENESIN AND DEXTROMETHORPHAN HYDROBROMIDE 1 TABLET: 600; 30 TABLET, EXTENDED RELEASE ORAL at 09:02

## 2022-02-24 RX ADMIN — GUAIFENESIN AND DEXTROMETHORPHAN HYDROBROMIDE 1 TABLET: 600; 30 TABLET, EXTENDED RELEASE ORAL at 08:02

## 2022-02-24 RX ADMIN — FLUTICASONE PROPIONATE 100 MCG: 50 SPRAY, METERED NASAL at 09:02

## 2022-02-24 RX ADMIN — Medication 250 MG: at 09:02

## 2022-02-24 RX ADMIN — INSULIN ASPART 4 UNITS: 100 INJECTION, SOLUTION INTRAVENOUS; SUBCUTANEOUS at 11:02

## 2022-02-24 RX ADMIN — HYDRALAZINE HYDROCHLORIDE 100 MG: 25 TABLET, FILM COATED ORAL at 02:02

## 2022-02-24 RX ADMIN — AZITHROMYCIN MONOHYDRATE 250 MG: 250 TABLET ORAL at 09:02

## 2022-02-24 RX ADMIN — CHOLECALCIFEROL TAB 25 MCG (1000 UNIT) 2000 UNITS: 25 TAB at 09:02

## 2022-02-24 RX ADMIN — INSULIN ASPART 5 UNITS: 100 INJECTION, SOLUTION INTRAVENOUS; SUBCUTANEOUS at 09:02

## 2022-02-24 RX ADMIN — ACETAMINOPHEN 650 MG: 325 TABLET ORAL at 10:02

## 2022-02-24 RX ADMIN — IPRATROPIUM BROMIDE AND ALBUTEROL SULFATE 3 ML: 2.5; .5 SOLUTION RESPIRATORY (INHALATION) at 08:02

## 2022-02-24 RX ADMIN — IPRATROPIUM BROMIDE AND ALBUTEROL SULFATE 3 ML: 2.5; .5 SOLUTION RESPIRATORY (INHALATION) at 07:02

## 2022-02-24 RX ADMIN — PREDNISONE 40 MG: 20 TABLET ORAL at 09:02

## 2022-02-24 RX ADMIN — HYDRALAZINE HYDROCHLORIDE 100 MG: 25 TABLET, FILM COATED ORAL at 09:02

## 2022-02-24 RX ADMIN — DILTIAZEM HYDROCHLORIDE 240 MG: 120 CAPSULE, COATED, EXTENDED RELEASE ORAL at 09:02

## 2022-02-24 RX ADMIN — DOXYCYCLINE HYCLATE 100 MG: 100 TABLET, COATED ORAL at 08:02

## 2022-02-24 RX ADMIN — PRAVASTATIN SODIUM 40 MG: 40 TABLET ORAL at 09:02

## 2022-02-24 RX ADMIN — INSULIN ASPART 5 UNITS: 100 INJECTION, SOLUTION INTRAVENOUS; SUBCUTANEOUS at 11:02

## 2022-02-24 RX ADMIN — HYDRALAZINE HYDROCHLORIDE 100 MG: 25 TABLET, FILM COATED ORAL at 08:02

## 2022-02-24 RX ADMIN — IPRATROPIUM BROMIDE AND ALBUTEROL SULFATE 3 ML: 2.5; .5 SOLUTION RESPIRATORY (INHALATION) at 04:02

## 2022-02-24 RX ADMIN — SODIUM CHLORIDE 500 ML: 0.9 INJECTION, SOLUTION INTRAVENOUS at 11:02

## 2022-02-24 RX ADMIN — TIOTROPIUM BROMIDE INHALATION SPRAY 2 PUFF: 3.12 SPRAY, METERED RESPIRATORY (INHALATION) at 09:02

## 2022-02-24 RX ADMIN — INSULIN ASPART 1 UNITS: 100 INJECTION, SOLUTION INTRAVENOUS; SUBCUTANEOUS at 10:02

## 2022-02-24 RX ADMIN — CARVEDILOL 25 MG: 25 TABLET, FILM COATED ORAL at 09:02

## 2022-02-24 RX ADMIN — HEPARIN SODIUM 5000 UNITS: 5000 INJECTION INTRAVENOUS; SUBCUTANEOUS at 10:02

## 2022-02-24 RX ADMIN — INSULIN ASPART 3 UNITS: 100 INJECTION, SOLUTION INTRAVENOUS; SUBCUTANEOUS at 12:02

## 2022-02-24 RX ADMIN — FLUTICASONE FUROATE AND VILANTEROL TRIFENATATE 1 PUFF: 100; 25 POWDER RESPIRATORY (INHALATION) at 09:02

## 2022-02-24 RX ADMIN — INSULIN ASPART 3 UNITS: 100 INJECTION, SOLUTION INTRAVENOUS; SUBCUTANEOUS at 04:02

## 2022-02-24 RX ADMIN — CARVEDILOL 25 MG: 25 TABLET, FILM COATED ORAL at 08:02

## 2022-02-24 RX ADMIN — HEPARIN SODIUM 5000 UNITS: 5000 INJECTION INTRAVENOUS; SUBCUTANEOUS at 02:02

## 2022-02-24 RX ADMIN — INSULIN ASPART 3 UNITS: 100 INJECTION, SOLUTION INTRAVENOUS; SUBCUTANEOUS at 09:02

## 2022-02-24 RX ADMIN — HEPARIN SODIUM 5000 UNITS: 5000 INJECTION INTRAVENOUS; SUBCUTANEOUS at 06:02

## 2022-02-24 RX ADMIN — CETIRIZINE HYDROCHLORIDE 10 MG: 5 TABLET ORAL at 09:02

## 2022-02-24 RX ADMIN — MONTELUKAST 10 MG: 10 TABLET, FILM COATED ORAL at 08:02

## 2022-02-24 RX ADMIN — CEFTRIAXONE 1 G: 1 INJECTION, SOLUTION INTRAVENOUS at 04:02

## 2022-02-24 RX ADMIN — IPRATROPIUM BROMIDE AND ALBUTEROL SULFATE 3 ML: 2.5; .5 SOLUTION RESPIRATORY (INHALATION) at 12:02

## 2022-02-24 NOTE — ASSESSMENT & PLAN NOTE
Nonischemic cardiomyopathy  Resolving  Patient is identified as having Combined Systolic and Diastolic heart failure that is Acute on Chronic. CHF is currently uncontrolled due to volume overload due to: Continued edema of extremities and JVD, Rales/crackles on pulmonary exam and Pulmonary edema/pleural effusion on CXR. Latest ECHO performed and demonstrates- Results for orders placed during the hospital encounter of 03/23/21    Echo Color Flow Doppler? Yes    Interpretation Summary  · The left ventricle is normal in size with eccentric hypertrophy and low normal systolic function. The estimated ejection fraction is 50%  · There are segmental left ventricular wall motion abnormalities.  · Normal left ventricular diastolic function.  · Normal right ventricular size with normal right ventricular systolic function.  · Mild tricuspid regurgitation.  · The estimated PA systolic pressure is 44 mmHg.  · There is pulmonary hypertension.  · Normal central venous pressure (3 mmHg).  . Continue Beta Blocker ACE/ARB Furosemide Nitrate/Vasodilator and monitor clinical status closely. Monitor on telemetry. Patient is on CHF pathway.  Monitor strict Is&Os and daily weights.  Place on fluid restriction of 1.5 L. Continue to stress to patient importance of self efficacy and  on diet for CHF. Last BNP reviewed- and noted below   Recent Labs   Lab 02/21/22 1935   *   -ECHO ordered  - home lasix 20 mg daily  - hospital lasix 20mg IV BID--> held in setting of VIRGILIO  Give small IVF back

## 2022-02-24 NOTE — ASSESSMENT & PLAN NOTE
Worsening SOB with productive cough with green sputum  - Afebrile without leukocytosis.   -Continue supplemental oxygen as needed, weaned to room air  - CTX/Doxy (given report of thick green sputum) and Predinsone x 5d  - scheduled duonebs [id number]  - started mucinex-DM BID, tessalon perles TID PRN cough  -Continue daily inhalers  - monitor for hyperglycemia while on steroids

## 2022-02-24 NOTE — ASSESSMENT & PLAN NOTE
Hyponatremia  - Cr 2.3>>3.5, baseline ~2  - suspect prerenal in setting of diuresis  - give 500cc IVF, suspect hypovolemia hyponatremia (corrected Na 129)  - holding home lasix/ losartan  - UA, urine studies, osmol, RP sono ordered  - avoid nephrotoxic agents  - monitor with daily labs  - strict I/Os

## 2022-02-24 NOTE — NURSING
Received AAO x4, respirations even and unlabored, no distress noted. [place], bilateral hearing aids in use at this time. Denies pain/discomfort. Will continue to monitor for changes.

## 2022-02-24 NOTE — PT/OT/SLP PROGRESS
Physical Therapy      Patient Name:  Myesha Quinones   MRN:  [id number]    Patient not seen today secondary to ANGELES for procedure. Will follow-up 2/25/22.

## 2022-02-24 NOTE — PLAN OF CARE
Problem: Adult Inpatient Plan of Care  Goal: Plan of Care Review  Outcome: Ongoing, Progressing  Goal: Patient-Specific Goal (Individualized)  Outcome: Ongoing, Progressing  Goal: Absence of Hospital-Acquired Illness or Injury  Outcome: Ongoing, Progressing  Goal: Optimal Comfort and Wellbeing  Outcome: Ongoing, Progressing  Goal: Readiness for Transition of Care  Outcome: Ongoing, Progressing     Problem: Infection  Goal: Absence of Infection Signs and Symptoms  Outcome: Ongoing, Progressing     Problem: Diabetes Comorbidity  Goal: Blood Glucose Level Within Targeted Range  Outcome: Ongoing, Progressing     Problem: Skin Injury Risk Increased  Goal: Skin Health and Integrity  Outcome: Ongoing, Progressing     Problem: Fluid and Electrolyte Imbalance (Acute Kidney Injury/Impairment)  Goal: Fluid and Electrolyte Balance  Outcome: Ongoing, Progressing     Problem: Oral Intake Inadequate (Acute Kidney Injury/Impairment)  Goal: Optimal Nutrition Intake  Outcome: Ongoing, Progressing     Problem: Renal Function Impairment (Acute Kidney Injury/Impairment)  Goal: Effective Renal Function  Outcome: Ongoing, Progressing

## 2022-02-24 NOTE — ASSESSMENT & PLAN NOTE
Patient's FSGs are controlled on current hypoglycemics.   Last A1c reviewed-   Lab Results   Component Value Date    HGBA1C 5.9 (H) 02/22/2022     Most recent fingerstick glucose reviewed-   Recent Labs   Lab 02/23/22 2053 02/24/22  0028 02/24/22  0804 02/24/22  1154   POCTGLUCOSE 408* 389* 285* 307*     Current correctional scale  Low  Maintain anti-hyperglycemic dose as follows-   Antihyperglycemics (From admission, onward)            Start     Stop Route Frequency Ordered    02/24/22 0900  insulin detemir U-100 pen 22 Units         -- SubQ Daily 02/24/22 0826    02/23/22 1130  insulin aspart U-100 pen 5 Units         -- SubQ 3 times daily with meals 02/23/22 0820    02/23/22 1018  insulin aspart U-100 pen 0-5 Units         -- SubQ Before meals & nightly PRN 02/23/22 0919      Accuchecks AC/HS  Diabetic/cardiac diet  Monitor closely while on prednisone>> increasing daily but cautiously in setting of VIRGILIO

## 2022-02-24 NOTE — PROGRESS NOTES
[facility] - Oncology ([facility])  [facility] Medicine  Progress Note    Patient Name: Myesha Quinones  MRN: [id number]  Patient Class: IP- Inpatient   Admission Date: 2/21/2022  Length of Stay: 2 days  Attending Physician: Estefanía Goncalves MD  Primary Care Provider: Catrachita Rothman MD        Subjective:     Principal Problem:Chronic obstructive pulmonary disease with acute exacerbation        HPI:  Myesha Quinones is a 81 y.o. female with a PMHx of CAD, ischemic cardiomyopathy with LBBB s/p biventricular ICD, CHF, CKD4, IDDM, breast cancer, HTN, asthma, COPD, and HLD who presents to the ED with complaints of shortness of breath. The patient reports shortness of breath beginning about 12 days ago. She reports her shortness of breath is worse with exertion. She was seen in cardiology clinic and started on lasix 20mg daily and reports weight loss of 5lbs and significant improvement of BLE edema. However, her dyspnea has been worsening. She reports a chronic cough that has become productive over the last week, noting green sputum. Endorses associated fatigue and generalized weakness. Denies orthopnea, PND, chest pain, palpitations, fever, chills, nausea, vomiting, diarrhea, or dysuria.    In the ED, patient found to be hypoxic requiring 2L O2 via nasal cannula. Patient also hypertensive and mildly tachycardic, afebrile. CBC unremarkable. CMP with baseline CKD. , improved from previous. Troponin WNL. Lactate and procal in process. UA negative for infection. CXR Stable right pleural fluid. Stable bilateral reticular opacities suggestive of pulmonary edema or infection. EKG with ventricularly paced rhythm, rate 84. The patient received oral hydralazine, duo neb treatment, and IV lasix in the ED.      Overview/Hospital Course:  Mrs. Quinones was admitted to observation for CHF and COPD exacerbation. Afebrile without leukocytosis. Started on Lasix IV 20 mg BID and scheduled duonebs, prednisone, and antibiotics.  Diuresed well, net neg 2L, however her Cr continues to increase. Will check urine studies, RP sono. Give small IVF bolus      Interval History: Cr continues to rise, 3.5 this AM. Patient and son seen at bedside, urine studies and RP sono ordered. Will recheck CXR as well to ensure euvolemia. Will give small IVF bolus, hyponatremic this AM corrected 129. Follow up repeat BMP    Review of Systems   Constitutional:  Positive for fatigue. Negative for chills and fever.   HENT:  Negative for congestion and rhinorrhea.    Respiratory:  Positive for cough, shortness of breath and wheezing. Negative for chest tightness.    Cardiovascular:  Positive for leg swelling. Negative for chest pain and palpitations.   Gastrointestinal:  Negative for abdominal distention and abdominal pain.   Genitourinary:  Negative for decreased urine volume, difficulty urinating, dysuria and frequency.   Musculoskeletal:  Negative for arthralgias and back pain.   Neurological:  Negative for dizziness, numbness and headaches.   Psychiatric/Behavioral:  Negative for agitation and behavioral problems.    Objective:     Vital Signs (Most Recent):  Temp: 97.9 °F (36.6 °C) (02/24/22 1144)  Pulse: 77 (02/24/22 1144)  Resp: 17 (02/24/22 1144)  BP: (!) 162/72 (02/24/22 1144)  SpO2: (!) 91 % (02/24/22 1144)   Vital Signs (24h Range):  Temp:  [97.6 °F (36.4 °C)-98.6 °F (37 °C)] 97.9 °F (36.6 °C)  Pulse:  [63-80] 77  Resp:  [16-19] 17  SpO2:  [91 %-96 %] 91 %  BP: (136-162)/(63-72) 162/72     Weight: 60.3 kg (132 lb 15.4 oz)  Body mass index is 23.55 kg/m².    Intake/Output Summary (Last 24 hours) at 2/24/2022 1150  Last data filed at 2/24/2022 0600  Gross per 24 hour   Intake 510 ml   Output 300 ml   Net 210 ml      Physical Exam  Vitals and nursing note reviewed.   Constitutional:       General: She is not in acute distress.     Appearance: Normal appearance. She is not ill-appearing.   HENT:      Head: Normocephalic and atraumatic.      Nose: Nose normal.       Mouth/Throat:      Mouth: Mucous membranes are dry.   Eyes:      Extraocular Movements: Extraocular movements intact.   Cardiovascular:      Rate and Rhythm: Normal rate and regular rhythm.      Pulses: Normal pulses.      Heart sounds: Normal heart sounds. No murmur heard.  Pulmonary:      Effort: Pulmonary effort is normal. No respiratory distress.      Breath sounds: Wheezing (inspiratory and expiratory throughout, improving) present. No rales.      Comments: Speaking in full sentences ORA   Abdominal:      General: Abdomen is flat.      Palpations: Abdomen is soft.   Musculoskeletal:         General: Normal range of motion.      Right lower leg: No edema.      Left lower leg: No edema.      Comments: Chronic RUE swelling from prior mastectomy    Skin:     General: Skin is warm and dry.      Capillary Refill: Capillary refill takes less than 2 seconds.   Neurological:      General: No focal deficit present.      Mental Status: She is alert and oriented to person, place, and time.   Psychiatric:         Mood and Affect: Mood normal.         Behavior: Behavior normal.       Significant Labs: All pertinent labs within the past 24 hours have been reviewed.    Significant Imaging: I have reviewed all pertinent imaging results/findings within the past 24 hours.      Assessment/Plan:      * Chronic obstructive pulmonary disease with acute exacerbation  Worsening SOB with productive cough with green sputum  - Afebrile without leukocytosis.   -Continue supplemental oxygen as needed, weaned to room air  - CTX/Doxy (given report of thick green sputum) and Predinsone x 5d  - scheduled duonebs [id number]  - started mucinex-DM BID, tessalon perles TID PRN cough  -Continue daily inhalers  - monitor for hyperglycemia while on steroids    Acute renal failure superimposed on stage 4 chronic kidney disease  Hyponatremia  - Cr 2.3>>3.5, baseline ~2  - suspect prerenal in setting of diuresis  - give 500cc IVF, suspect hypovolemia  hyponatremia (corrected Na 129)  - holding home lasix/ losartan  - UA, urine studies, osmol, RP sono ordered  - avoid nephrotoxic agents  - monitor with daily labs  - strict I/Os    Acute on chronic combined systolic and diastolic heart failure  Nonischemic cardiomyopathy  Resolving  Patient is identified as having Combined Systolic and Diastolic heart failure that is Acute on Chronic. CHF is currently uncontrolled due to volume overload due to: Continued edema of extremities and JVD, Rales/crackles on pulmonary exam and Pulmonary edema/pleural effusion on CXR. Latest ECHO performed and demonstrates- Results for orders placed during the hospital encounter of 03/23/21    Echo Color Flow Doppler? Yes    Interpretation Summary  · The left ventricle is normal in size with eccentric hypertrophy and low normal systolic function. The estimated ejection fraction is 50%  · There are segmental left ventricular wall motion abnormalities.  · Normal left ventricular diastolic function.  · Normal right ventricular size with normal right ventricular systolic function.  · Mild tricuspid regurgitation.  · The estimated PA systolic pressure is 44 mmHg.  · There is pulmonary hypertension.  · Normal central venous pressure (3 mmHg).  . Continue Beta Blocker ACE/ARB Furosemide Nitrate/Vasodilator and monitor clinical status closely. Monitor on telemetry. Patient is on CHF pathway.  Monitor strict Is&Os and daily weights.  Place on fluid restriction of 1.5 L. Continue to stress to patient importance of self efficacy and  on diet for CHF. Last BNP reviewed- and noted below   Recent Labs   Lab 02/21/22  1935   *   -ECHO ordered  - home lasix 20 mg daily  - hospital lasix 20mg IV BID--> held in setting of VIRGILIO  Give small IVF back    Type 2 diabetes mellitus with hyperglycemia, with long-term current use of insulin  Patient's FSGs are controlled on current hypoglycemics.   Last A1c reviewed-   Lab Results   Component Value Date     HGBA1C 5.9 (H) 02/22/2022     Most recent fingerstick glucose reviewed-   Recent Labs   Lab 02/23/22  2053 02/24/22  0028 02/24/22  0804 02/24/22  1154   POCTGLUCOSE 408* 389* 285* 307*     Current correctional scale  Low  Maintain anti-hyperglycemic dose as follows-   Antihyperglycemics (From admission, onward)            Start     Stop Route Frequency Ordered    02/24/22 0900  insulin detemir U-100 pen 22 Units         -- SubQ Daily 02/24/22 0826    02/23/22 1130  insulin aspart U-100 pen 5 Units         -- SubQ 3 times daily with meals 02/23/22 0820    02/23/22 1018  insulin aspart U-100 pen 0-5 Units         -- SubQ Before meals & nightly PRN 02/23/22 0919      Accuchecks AC/HS  Diabetic/cardiac diet  Monitor closely while on prednisone>> increasing daily but cautiously in setting of VIRGILIO    Hyperlipidemia associated with type 2 diabetes mellitus   Patient is chronically on statin.will continue for now. Monitor clinically. Last LDL was   Lab Results   Component Value Date    LDLCALC 43.8 (L) 02/08/2022       Iron deficiency anemia  Patient's anemia is currently controlled.   Current CBC reviewed-   Lab Results   Component Value Date    HGB 10.9 (L) 02/21/2022    HCT 35.4 (L) 02/21/2022     Monitor serial CBC and transfuse if patient becomes hemodynamically unstable, symptomatic or H/H drops below 7/21.     Hypertension associated with diabetes  -Initially hypertensive, improved with home medications.  -Continue coreg 25mg BID, cardizem 240mg daily, hydralazine 100mg TID, ibersartan 300mg qhs.  - cardiac diet    Obstructive sleep apnea  -Continue CPAP qhs    CKD (chronic kidney disease) stage 4, GFR 15-29 ml/min  - see VIRGILIO    Acute respiratory failure with hypoxia and hypercarbia  Patient with acute hypoxemic respiratory failure likely secondary to CHF exacerbation + COPD exacerbation. Patient reporting worsening cough for the last week with green sputum. CXR with stable right pleural fluid. Stable bilateral  reticular opacities suggestive of pulmonary edema or infection.    -Procalcitonin and lactic WNL. Do not suspect PNA  -CTX/ doxy switched to azithromycin for COPD exacerbation + prednisone, scheduled breathing treatments  - diuresis held in setting of VIRGILIO  - wean oxygen as tolerated, currently on RA    Biventricular ICD (implantable cardioverter-defibrillator) in place  Seen on CXR  - stable    Asthma, chronic  -Continue montelukast  -PRN duo nebs      VTE Risk Mitigation (From admission, onward)         Ordered     heparin (porcine) injection 5,000 Units  Every 8 hours         02/22/22 0335     IP VTE HIGH RISK PATIENT  Once         02/22/22 0335     Place sequential compression device  Until discontinued         02/22/22 0335                Discharge Planning   ALIX: 2/26/2022     Code Status: Full Code   Is the patient medically ready for discharge?: No    Reason for patient still in hospital (select all that apply): Patient trending condition, Laboratory test and Pending disposition  Discharge Plan A: Home with family, Home Health              Discussed with staff    Jolynn Kemp PA-C  Department of Hospital Medicine   [facility] - Oncology ([facility])

## 2022-02-24 NOTE — PT/OT/SLP PROGRESS
Occupational Therapy      Patient Name:  Myesha Quinones   MRN:  [id number]    Patient not seen today secondary to away at a procedure  2/24/2022

## 2022-02-24 NOTE — PLAN OF CARE
No acute events. Pt weaned back to room air, sats 92-94%. Pt reports SOB improving. Daily prednisone continued. Resp treatments scheduled TID. Accuchecks AC HS, BG 300s. Scheduled novolog and levemir given in addition to SSI. Pt up ad lacy, voiding without difficulty. Pt remains afebrile. NSR on telemetry. WCTM.

## 2022-02-24 NOTE — SUBJECTIVE & OBJECTIVE
Interval History: Cr continues to rise, 3.5 this AM. Patient and son seen at bedside, urine studies and RP sono ordered. Will recheck CXR as well to ensure euvolemia. Will give small IVF bolus, hyponatremic this AM corrected 129. Follow up repeat BMP    Review of Systems   Constitutional:  Positive for fatigue. Negative for chills and fever.   HENT:  Negative for congestion and rhinorrhea.    Respiratory:  Positive for cough, shortness of breath and wheezing. Negative for chest tightness.    Cardiovascular:  Positive for leg swelling. Negative for chest pain and palpitations.   Gastrointestinal:  Negative for abdominal distention and abdominal pain.   Genitourinary:  Negative for decreased urine volume, difficulty urinating, dysuria and frequency.   Musculoskeletal:  Negative for arthralgias and back pain.   Neurological:  Negative for dizziness, numbness and headaches.   Psychiatric/Behavioral:  Negative for agitation and behavioral problems.    Objective:     Vital Signs (Most Recent):  Temp: 97.9 °F (36.6 °C) (02/24/22 1144)  Pulse: 77 (02/24/22 1144)  Resp: 17 (02/24/22 1144)  BP: (!) 162/72 (02/24/22 1144)  SpO2: (!) 91 % (02/24/22 1144)   Vital Signs (24h Range):  Temp:  [97.6 °F (36.4 °C)-98.6 °F (37 °C)] 97.9 °F (36.6 °C)  Pulse:  [63-80] 77  Resp:  [16-19] 17  SpO2:  [91 %-96 %] 91 %  BP: (136-162)/(63-72) 162/72     Weight: 60.3 kg (132 lb 15.4 oz)  Body mass index is 23.55 kg/m².    Intake/Output Summary (Last 24 hours) at 2/24/2022 1150  Last data filed at 2/24/2022 0600  Gross per 24 hour   Intake 510 ml   Output 300 ml   Net 210 ml      Physical Exam  Vitals and nursing note reviewed.   Constitutional:       General: She is not in acute distress.     Appearance: Normal appearance. She is not ill-appearing.   HENT:      Head: Normocephalic and atraumatic.      Nose: Nose normal.      Mouth/Throat:      Mouth: Mucous membranes are dry.   Eyes:      Extraocular Movements: Extraocular movements intact.    Cardiovascular:      Rate and Rhythm: Normal rate and regular rhythm.      Pulses: Normal pulses.      Heart sounds: Normal heart sounds. No murmur heard.  Pulmonary:      Effort: Pulmonary effort is normal. No respiratory distress.      Breath sounds: Wheezing (inspiratory and expiratory throughout, improving) present. No rales.      Comments: Speaking in full sentences ORA   Abdominal:      General: Abdomen is flat.      Palpations: Abdomen is soft.   Musculoskeletal:         General: Normal range of motion.      Right lower leg: No edema.      Left lower leg: No edema.      Comments: Chronic RUE swelling from prior mastectomy    Skin:     General: Skin is warm and dry.      Capillary Refill: Capillary refill takes less than 2 seconds.   Neurological:      General: No focal deficit present.      Mental Status: She is alert and oriented to person, place, and time.   Psychiatric:         Mood and Affect: Mood normal.         Behavior: Behavior normal.       Significant Labs: All pertinent labs within the past 24 hours have been reviewed.    Significant Imaging: I have reviewed all pertinent imaging results/findings within the past 24 hours.

## 2022-02-25 LAB
ALBUMIN SERPL BCP-MCNC: 3 G/DL (ref 3.5–5.2)
ALP SERPL-CCNC: 100 U/L (ref 55–135)
ALT SERPL W/O P-5'-P-CCNC: 19 U/L (ref 10–44)
ANION GAP SERPL CALC-SCNC: 12 MMOL/L (ref 8–16)
AST SERPL-CCNC: 19 U/L (ref 10–40)
BILIRUB DIRECT SERPL-MCNC: 0.2 MG/DL (ref 0.1–0.3)
BILIRUB SERPL-MCNC: 0.3 MG/DL (ref 0.1–1)
BNP SERPL-MCNC: 657 PG/ML (ref 0–99)
BUN SERPL-MCNC: 75 MG/DL (ref 8–23)
CALCIUM SERPL-MCNC: 8.4 MG/DL (ref 8.7–10.5)
CHLORIDE SERPL-SCNC: 95 MMOL/L (ref 95–110)
CO2 SERPL-SCNC: 17 MMOL/L (ref 23–29)
CREAT SERPL-MCNC: 3.2 MG/DL (ref 0.5–1.4)
EST. GFR  (AFRICAN AMERICAN): 14.9 ML/MIN/1.73 M^2
EST. GFR  (NON AFRICAN AMERICAN): 12.9 ML/MIN/1.73 M^2
GLUCOSE SERPL-MCNC: 240 MG/DL (ref 70–110)
POCT GLUCOSE: 243 MG/DL (ref 70–110)
POCT GLUCOSE: 245 MG/DL (ref 70–110)
POCT GLUCOSE: 295 MG/DL (ref 70–110)
POCT GLUCOSE: 335 MG/DL (ref 70–110)
POTASSIUM SERPL-SCNC: 4.9 MMOL/L (ref 3.5–5.1)
PROT SERPL-MCNC: 6.8 G/DL (ref 6–8.4)
SODIUM SERPL-SCNC: 124 MMOL/L (ref 136–145)

## 2022-02-25 PROCEDURE — 25000242 PHARM REV CODE 250 ALT 637 W/ HCPCS: Performed by: PHYSICIAN ASSISTANT

## 2022-02-25 PROCEDURE — 94640 AIRWAY INHALATION TREATMENT: CPT

## 2022-02-25 PROCEDURE — 25000242 PHARM REV CODE 250 ALT 637 W/ HCPCS: Performed by: NURSE PRACTITIONER

## 2022-02-25 PROCEDURE — 63600175 PHARM REV CODE 636 W HCPCS: Performed by: PHYSICIAN ASSISTANT

## 2022-02-25 PROCEDURE — 99233 SBSQ HOSP IP/OBS HIGH 50: CPT | Mod: ,,, | Performed by: PHYSICIAN ASSISTANT

## 2022-02-25 PROCEDURE — 97535 SELF CARE MNGMENT TRAINING: CPT

## 2022-02-25 PROCEDURE — 63600175 PHARM REV CODE 636 W HCPCS: Performed by: NURSE PRACTITIONER

## 2022-02-25 PROCEDURE — 80048 BASIC METABOLIC PNL TOTAL CA: CPT | Performed by: NURSE PRACTITIONER

## 2022-02-25 PROCEDURE — 97116 GAIT TRAINING THERAPY: CPT

## 2022-02-25 PROCEDURE — 36415 COLL VENOUS BLD VENIPUNCTURE: CPT | Performed by: NURSE PRACTITIONER

## 2022-02-25 PROCEDURE — 36415 COLL VENOUS BLD VENIPUNCTURE: CPT | Performed by: PHYSICIAN ASSISTANT

## 2022-02-25 PROCEDURE — 25000003 PHARM REV CODE 250: Performed by: NURSE PRACTITIONER

## 2022-02-25 PROCEDURE — 99900035 HC TECH TIME PER 15 MIN (STAT)

## 2022-02-25 PROCEDURE — 99233 PR SUBSEQUENT HOSPITAL CARE,LEVL III: ICD-10-PCS | Mod: ,,, | Performed by: PHYSICIAN ASSISTANT

## 2022-02-25 PROCEDURE — 94761 N-INVAS EAR/PLS OXIMETRY MLT: CPT

## 2022-02-25 PROCEDURE — 94799 UNLISTED PULMONARY SVC/PX: CPT

## 2022-02-25 PROCEDURE — 94660 CPAP INITIATION&MGMT: CPT

## 2022-02-25 PROCEDURE — 20600001 HC STEP DOWN PRIVATE ROOM

## 2022-02-25 PROCEDURE — 25000003 PHARM REV CODE 250: Performed by: PHYSICIAN ASSISTANT

## 2022-02-25 PROCEDURE — 83880 ASSAY OF NATRIURETIC PEPTIDE: CPT | Performed by: PHYSICIAN ASSISTANT

## 2022-02-25 PROCEDURE — 97110 THERAPEUTIC EXERCISES: CPT

## 2022-02-25 PROCEDURE — 80076 HEPATIC FUNCTION PANEL: CPT | Performed by: PHYSICIAN ASSISTANT

## 2022-02-25 RX ORDER — IPRATROPIUM BROMIDE AND ALBUTEROL SULFATE 2.5; .5 MG/3ML; MG/3ML
3 SOLUTION RESPIRATORY (INHALATION)
Status: DISCONTINUED | OUTPATIENT
Start: 2022-02-25 | End: 2022-03-01 | Stop reason: HOSPADM

## 2022-02-25 RX ADMIN — HYDRALAZINE HYDROCHLORIDE 100 MG: 25 TABLET, FILM COATED ORAL at 04:02

## 2022-02-25 RX ADMIN — CETIRIZINE HYDROCHLORIDE 10 MG: 5 TABLET ORAL at 09:02

## 2022-02-25 RX ADMIN — INSULIN ASPART 3 UNITS: 100 INJECTION, SOLUTION INTRAVENOUS; SUBCUTANEOUS at 08:02

## 2022-02-25 RX ADMIN — HEPARIN SODIUM 5000 UNITS: 5000 INJECTION INTRAVENOUS; SUBCUTANEOUS at 05:02

## 2022-02-25 RX ADMIN — INSULIN ASPART 2 UNITS: 100 INJECTION, SOLUTION INTRAVENOUS; SUBCUTANEOUS at 09:02

## 2022-02-25 RX ADMIN — CARVEDILOL 25 MG: 25 TABLET, FILM COATED ORAL at 09:02

## 2022-02-25 RX ADMIN — PREDNISONE 40 MG: 20 TABLET ORAL at 09:02

## 2022-02-25 RX ADMIN — IPRATROPIUM BROMIDE AND ALBUTEROL SULFATE 3 ML: 2.5; .5 SOLUTION RESPIRATORY (INHALATION) at 10:02

## 2022-02-25 RX ADMIN — DILTIAZEM HYDROCHLORIDE 240 MG: 120 CAPSULE, COATED, EXTENDED RELEASE ORAL at 09:02

## 2022-02-25 RX ADMIN — Medication 250 MG: at 09:02

## 2022-02-25 RX ADMIN — FLUTICASONE FUROATE AND VILANTEROL TRIFENATATE 1 PUFF: 100; 25 POWDER RESPIRATORY (INHALATION) at 09:02

## 2022-02-25 RX ADMIN — HEPARIN SODIUM 5000 UNITS: 5000 INJECTION INTRAVENOUS; SUBCUTANEOUS at 09:02

## 2022-02-25 RX ADMIN — TIOTROPIUM BROMIDE INHALATION SPRAY 2 PUFF: 3.12 SPRAY, METERED RESPIRATORY (INHALATION) at 09:02

## 2022-02-25 RX ADMIN — HYDRALAZINE HYDROCHLORIDE 100 MG: 25 TABLET, FILM COATED ORAL at 09:02

## 2022-02-25 RX ADMIN — CHOLECALCIFEROL TAB 25 MCG (1000 UNIT) 2000 UNITS: 25 TAB at 09:02

## 2022-02-25 RX ADMIN — INSULIN ASPART 3 UNITS: 100 INJECTION, SOLUTION INTRAVENOUS; SUBCUTANEOUS at 04:02

## 2022-02-25 RX ADMIN — CEFTRIAXONE 1 G: 1 INJECTION, SOLUTION INTRAVENOUS at 02:02

## 2022-02-25 RX ADMIN — HEPARIN SODIUM 5000 UNITS: 5000 INJECTION INTRAVENOUS; SUBCUTANEOUS at 02:02

## 2022-02-25 RX ADMIN — GUAIFENESIN AND DEXTROMETHORPHAN HYDROBROMIDE 1 TABLET: 600; 30 TABLET, EXTENDED RELEASE ORAL at 09:02

## 2022-02-25 RX ADMIN — INSULIN ASPART 2 UNITS: 100 INJECTION, SOLUTION INTRAVENOUS; SUBCUTANEOUS at 11:02

## 2022-02-25 RX ADMIN — DOXYCYCLINE HYCLATE 100 MG: 100 TABLET, COATED ORAL at 09:02

## 2022-02-25 RX ADMIN — PRAVASTATIN SODIUM 40 MG: 40 TABLET ORAL at 09:02

## 2022-02-25 RX ADMIN — FLUTICASONE PROPIONATE 100 MCG: 50 SPRAY, METERED NASAL at 09:02

## 2022-02-25 RX ADMIN — IPRATROPIUM BROMIDE AND ALBUTEROL SULFATE 3 ML: 2.5; .5 SOLUTION RESPIRATORY (INHALATION) at 09:02

## 2022-02-25 RX ADMIN — INSULIN ASPART 3 UNITS: 100 INJECTION, SOLUTION INTRAVENOUS; SUBCUTANEOUS at 11:02

## 2022-02-25 RX ADMIN — MONTELUKAST 10 MG: 10 TABLET, FILM COATED ORAL at 09:02

## 2022-02-25 RX ADMIN — IPRATROPIUM BROMIDE AND ALBUTEROL SULFATE 3 ML: 2.5; .5 SOLUTION RESPIRATORY (INHALATION) at 02:02

## 2022-02-25 NOTE — PLAN OF CARE
POC reviewed with patient. Falls and safety precautions in place. Pt UAL with steady gait using walker. Pt remained afebrile overnight. No signs or symptoms of active bleeding. No complaining of some left wrist pain, PRN pain meds given per MAR. Pt on RA during the day, CPAP at night. Monitoring morning labs, will replete electrolytes as needed. Pt resting comfortably in bed. Will continue to monitor.

## 2022-02-25 NOTE — PLAN OF CARE
Problem: Occupational Therapy Goal  Goal: Occupational Therapy Goal  Description: Goals to be met by: 3/8/2022     Patient will increase functional independence with ADLs by performing:    UE Dressing with [place].  LE Dressing with Modified [place].  Grooming while standing at sink with Modified [place].  Toileting from toilet with Modified [place] for hygiene and clothing management.   Supine to sit with Modified [place].  Sit to stand transfer with Modified [place] with or without AD.  Toilet transfer to toilet with Modified [place] with or without AD.    Outcome: Ongoing, Progressing

## 2022-02-25 NOTE — PT/OT/SLP PROGRESS
Occupational Therapy   Treatment    Name: Myesha Quinones  MRN: [id number]  Admitting Diagnosis:  Chronic obstructive pulmonary disease with acute exacerbation       Recommendations:     Discharge Recommendations: home health OT  Discharge Equipment Recommendations:  hip kit, shower chair (recommended but optional)  Barriers to discharge:  None    Assessment:     Myesha Quinones is a 81 y.o. female with a medical diagnosis of Chronic obstructive pulmonary disease with acute exacerbation.  She presents with deficits in self-care tasks as well as mobility with some SOB noted with activity. Pt. Required S/SBA for functional mobility to and from bathroom without an AD on this date. Pt. Educated on AE and DME to assist with energy conservation for ADL tasks and demonstrated good understanding. . Performance deficits affecting function are weakness, impaired endurance, impaired self care skills, impaired functional mobilty, impaired cardiopulmonary response to activity. Pt. Would benefit from continued OT services to maximize safety and I with ADL tasks.     Rehab Prognosis:  Good; patient would benefit from acute skilled OT services to address these deficits and reach maximum level of function.       Plan:     Patient to be seen 3 x/week to address the above listed problems via self-care/home management, therapeutic activities, therapeutic exercises  · Plan of Care Expires: 03/22/22  · Plan of Care Reviewed with: patient    Subjective   Pt. Agreeable to session   Pain/Comfort:  · Pain Rating 1: 0/10  · Pain Rating Post-Intervention 1: 0/10    Objective:     Communicated with: nurse prior to session.  Patient found supine with   upon OT entry to room.    General Precautions: Standard, fall, respiratory   Orthopedic Precautions:N/A   Braces: N/A  Respiratory Status: Room air     Occupational Performance:     Bed Mobility:    · Not tested     Functional Mobility/Transfers:  · Patient completed Sit <> Stand Transfer  with supervision  with  no assistive device   · Patient completed Toilet Transfer Stand Pivot technique with supervision with  no AD  · Functional Mobility: pt. Ambulated to and from the bathroom without an AD and S/SBA    Activities of Daily Living:  · OT reviewed use of hip kit to assess with bathing tasks and LBD to conserve energy  · OT recommended use of shower chair for bathing with Hand held shower for safety and energy conservation      [facility] 6 Click ADL: 21    Treatment & Education:  Pt. Educated on role of oT and POC     Patient left up in chair with call button in reachEducation:      GOALS:   Multidisciplinary Problems     Occupational Therapy Goals        Problem: Occupational Therapy Goal    Goal Priority Disciplines Outcome Interventions   Occupational Therapy Goal     OT, PT/OT Ongoing, Progressing    Description: Goals to be met by: 3/8/2022     Patient will increase functional independence with ADLs by performing:    UE Dressing with [place].  LE Dressing with Modified [place].  Grooming while standing at sink with Modified [place].  Toileting from toilet with Modified [place] for hygiene and clothing management.   Supine to sit with Modified [place].  Sit to stand transfer with Modified [place] with or without AD.  Toilet transfer to toilet with Modified [place] with or without AD.                     Time Tracking:     OT Date of Treatment: 02/25/22  OT Start Time: 1333  OT Stop Time: 1344  OT Total Time (min): 11 min    Billable Minutes:Self Care/Home Management 11    OT/CRISS: OT          2/25/2022

## 2022-02-25 NOTE — SUBJECTIVE & OBJECTIVE
Interval History: No acute events overnight, labs show hyponatremia  Will consult nephro for further assistance in VIRGILIO/volume status  Patient has no acute complaints, still some Sob with exertion    Review of Systems   Constitutional:  Positive for fatigue. Negative for chills and fever.   HENT:  Negative for congestion and rhinorrhea.    Respiratory:  Positive for cough, shortness of breath and wheezing. Negative for chest tightness.    Cardiovascular:  Negative for chest pain, palpitations and leg swelling.   Gastrointestinal:  Negative for abdominal distention and abdominal pain.   Genitourinary:  Negative for decreased urine volume, difficulty urinating, dysuria and frequency.   Musculoskeletal:  Negative for arthralgias and back pain.   Neurological:  Negative for dizziness, numbness and headaches.   Psychiatric/Behavioral:  Negative for agitation and behavioral problems.    Objective:     Vital Signs (Most Recent):  Temp: 97.7 °F (36.5 °C) (02/25/22 1125)  Pulse: 72 (02/25/22 1136)  Resp: 18 (02/25/22 1125)  BP: (!) 171/75 (02/25/22 1125)  SpO2: (!) 92 % (02/25/22 1125)   Vital Signs (24h Range):  Temp:  [97.1 °F (36.2 °C)-98.3 °F (36.8 °C)] 97.7 °F (36.5 °C)  Pulse:  [61-78] 72  Resp:  [17-18] 18  SpO2:  [90 %-98 %] 92 %  BP: (112-171)/(57-75) 171/75     Weight: 64.4 kg (141 lb 15.6 oz)  Body mass index is 25.15 kg/m².    Intake/Output Summary (Last 24 hours) at 2/25/2022 1156  Last data filed at 2/25/2022 1100  Gross per 24 hour   Intake 1080 ml   Output 1100 ml   Net -20 ml      Physical Exam  Vitals and nursing note reviewed.   Constitutional:       General: She is not in acute distress.     Appearance: Normal appearance. She is not ill-appearing.   HENT:      Head: Normocephalic and atraumatic.      Nose: Nose normal.      Mouth/Throat:      Mouth: Mucous membranes are dry.   Eyes:      Extraocular Movements: Extraocular movements intact.   Cardiovascular:      Rate and Rhythm: Normal rate and regular  rhythm.      Pulses: Normal pulses.      Heart sounds: Normal heart sounds.   Pulmonary:      Effort: Pulmonary effort is normal. No respiratory distress.      Breath sounds: Wheezing (mild lower lung) present.      Comments: Speaking in full sentences ORA   Abdominal:      General: Abdomen is flat.      Palpations: Abdomen is soft.   Musculoskeletal:         General: Normal range of motion.      Right lower leg: No edema.      Left lower leg: No edema.      Comments: Chronic RUE swelling from prior mastectomy    Skin:     General: Skin is warm and dry.      Capillary Refill: Capillary refill takes less than 2 seconds.   Neurological:      General: No focal deficit present.      Mental Status: She is alert.   Psychiatric:         Mood and Affect: Mood normal.         Behavior: Behavior normal.       Significant Labs: All pertinent labs within the past 24 hours have been reviewed.    Significant Imaging: I have reviewed all pertinent imaging results/findings within the past 24 hours.

## 2022-02-25 NOTE — PROGRESS NOTES
[facility] - Oncology ([facility])  [facility] Medicine  Progress Note    Patient Name: Myesha Quinones  MRN: [id number]  Patient Class: IP- Inpatient   Admission Date: 2/21/2022  Length of Stay: 3 days  Attending Physician: Estefanía Goncalves MD  Primary Care Provider: Catrachita Rothman MD        Subjective:     Principal Problem:Chronic obstructive pulmonary disease with acute exacerbation        HPI:  Myesha Quinones is a 81 y.o. female with a PMHx of CAD, ischemic cardiomyopathy with LBBB s/p biventricular ICD, CHF, CKD4, IDDM, breast cancer, HTN, asthma, COPD, and HLD who presents to the ED with complaints of shortness of breath. The patient reports shortness of breath beginning about 12 days ago. She reports her shortness of breath is worse with exertion. She was seen in cardiology clinic and started on lasix 20mg daily and reports weight loss of 5lbs and significant improvement of BLE edema. However, her dyspnea has been worsening. She reports a chronic cough that has become productive over the last week, noting green sputum. Endorses associated fatigue and generalized weakness. Denies orthopnea, PND, chest pain, palpitations, fever, chills, nausea, vomiting, diarrhea, or dysuria.    In the ED, patient found to be hypoxic requiring 2L O2 via nasal cannula. Patient also hypertensive and mildly tachycardic, afebrile. CBC unremarkable. CMP with baseline CKD. , improved from previous. Troponin WNL. Lactate and procal in process. UA negative for infection. CXR Stable right pleural fluid. Stable bilateral reticular opacities suggestive of pulmonary edema or infection. EKG with ventricularly paced rhythm, rate 84. The patient received oral hydralazine, duo neb treatment, and IV lasix in the ED.      Overview/Hospital Course:  Mrs. Quinones was admitted to observation for CHF and COPD exacerbation. Afebrile without leukocytosis. Started on Lasix IV 20 mg BID and scheduled duonebs, prednisone, and antibiotics.  Diuresed well, net neg 2L, however her Cr continues to increase. Suspect over diuresis. Will check urine studies, RP sono unremarkable. Give small IVF bolus. Nephrology consulted for further assistance      Interval History: No acute events overnight, labs show hyponatremia  Will consult nephro for further assistance in VIRGILIO/volume status  Patient has no acute complaints, still some Sob with exertion    Review of Systems   Constitutional:  Positive for fatigue. Negative for chills and fever.   HENT:  Negative for congestion and rhinorrhea.    Respiratory:  Positive for cough, shortness of breath and wheezing. Negative for chest tightness.    Cardiovascular:  Negative for chest pain, palpitations and leg swelling.   Gastrointestinal:  Negative for abdominal distention and abdominal pain.   Genitourinary:  Negative for decreased urine volume, difficulty urinating, dysuria and frequency.   Musculoskeletal:  Negative for arthralgias and back pain.   Neurological:  Negative for dizziness, numbness and headaches.   Psychiatric/Behavioral:  Negative for agitation and behavioral problems.    Objective:     Vital Signs (Most Recent):  Temp: 97.7 °F (36.5 °C) (02/25/22 1125)  Pulse: 72 (02/25/22 1136)  Resp: 18 (02/25/22 1125)  BP: (!) 171/75 (02/25/22 1125)  SpO2: (!) 92 % (02/25/22 1125)   Vital Signs (24h Range):  Temp:  [97.1 °F (36.2 °C)-98.3 °F (36.8 °C)] 97.7 °F (36.5 °C)  Pulse:  [61-78] 72  Resp:  [17-18] 18  SpO2:  [90 %-98 %] 92 %  BP: (112-171)/(57-75) 171/75     Weight: 64.4 kg (141 lb 15.6 oz)  Body mass index is 25.15 kg/m².    Intake/Output Summary (Last 24 hours) at 2/25/2022 1156  Last data filed at 2/25/2022 1100  Gross per 24 hour   Intake 1080 ml   Output 1100 ml   Net -20 ml      Physical Exam  Vitals and nursing note reviewed.   Constitutional:       General: She is not in acute distress.     Appearance: Normal appearance. She is not ill-appearing.   HENT:      Head: Normocephalic and atraumatic.       Nose: Nose normal.      Mouth/Throat:      Mouth: Mucous membranes are dry.   Eyes:      Extraocular Movements: Extraocular movements intact.   Cardiovascular:      Rate and Rhythm: Normal rate and regular rhythm.      Pulses: Normal pulses.      Heart sounds: Normal heart sounds.   Pulmonary:      Effort: Pulmonary effort is normal. No respiratory distress.      Breath sounds: Wheezing (mild lower lung) present.      Comments: Speaking in full sentences ORA   Abdominal:      General: Abdomen is flat.      Palpations: Abdomen is soft.   Musculoskeletal:         General: Normal range of motion.      Right lower leg: No edema.      Left lower leg: No edema.      Comments: Chronic RUE swelling from prior mastectomy    Skin:     General: Skin is warm and dry.      Capillary Refill: Capillary refill takes less than 2 seconds.   Neurological:      General: No focal deficit present.      Mental Status: She is alert.   Psychiatric:         Mood and Affect: Mood normal.         Behavior: Behavior normal.       Significant Labs: All pertinent labs within the past 24 hours have been reviewed.    Significant Imaging: I have reviewed all pertinent imaging results/findings within the past 24 hours.      Assessment/Plan:      * Chronic obstructive pulmonary disease with acute exacerbation  Worsening SOB with productive cough with green sputum  - Afebrile without leukocytosis.   -Continue supplemental oxygen as needed, weaned to room air  - CTX/Doxy (given report of thick green sputum) and Predinsone x 5d  - scheduled duonebs [id number]  - started mucinex-DM BID, tessalon perles TID PRN cough  -Continue daily inhalers  - monitor for hyperglycemia while on steroids    Acute renal failure superimposed on stage 4 chronic kidney disease  Hyponatremia  - Cr 2.3>>3.5, baseline ~2  - suspect prerenal in setting of diuresis  - give 500cc IVF, suspect hypovolemia hyponatremia (corrected Na 129)  - holding home lasix/ losartan  - UA, urine  studies, osmol, RP sono ordered  Nephrology consulted for further assistance for persistent hyponatremia and VIRGILIO  - avoid nephrotoxic agents  - monitor with daily labs  - strict I/Os    Acute on chronic combined systolic and diastolic heart failure  Nonischemic cardiomyopathy  Resolving  Patient is identified as having Combined Systolic and Diastolic heart failure that is Acute on Chronic. CHF is currently uncontrolled due to volume overload due to: Continued edema of extremities and JVD, Rales/crackles on pulmonary exam and Pulmonary edema/pleural effusion on CXR. Latest ECHO performed and demonstrates- Results for orders placed during the hospital encounter of 03/23/21    Echo Color Flow Doppler? Yes    Interpretation Summary  · The left ventricle is normal in size with eccentric hypertrophy and low normal systolic function. The estimated ejection fraction is 50%  · There are segmental left ventricular wall motion abnormalities.  · Normal left ventricular diastolic function.  · Normal right ventricular size with normal right ventricular systolic function.  · Mild tricuspid regurgitation.  · The estimated PA systolic pressure is 44 mmHg.  · There is pulmonary hypertension.  · Normal central venous pressure (3 mmHg).  . Continue Beta Blocker ACE/ARB Furosemide Nitrate/Vasodilator and monitor clinical status closely. Monitor on telemetry. Patient is on CHF pathway.  Monitor strict Is&Os and daily weights.  Place on fluid restriction of 1.5 L. Continue to stress to patient importance of self efficacy and  on diet for CHF. Last BNP reviewed- and noted below   Recent Labs   Lab 02/21/22  1935   *   -ECHO ordered  - home lasix 20 mg daily  - hospital lasix 20mg IV BID--> held in setting of VIRGILIO  Give small IVF back    Type 2 diabetes mellitus with hyperglycemia, with long-term current use of insulin  Patient's FSGs are controlled on current hypoglycemics.   Last A1c reviewed-   Lab Results   Component Value  Date    HGBA1C 5.9 (H) 02/22/2022     Most recent fingerstick glucose reviewed-   Recent Labs   Lab 02/23/22  2053 02/24/22  0028 02/24/22  0804 02/24/22  1154   POCTGLUCOSE 408* 389* 285* 307*     Current correctional scale  Low  Maintain anti-hyperglycemic dose as follows-   Antihyperglycemics (From admission, onward)            Start     Stop Route Frequency Ordered    02/24/22 0900  insulin detemir U-100 pen 22 Units         -- SubQ Daily 02/24/22 0826    02/23/22 1130  insulin aspart U-100 pen 5 Units         -- SubQ 3 times daily with meals 02/23/22 0820    02/23/22 1018  insulin aspart U-100 pen 0-5 Units         -- SubQ Before meals & nightly PRN 02/23/22 0919      Accuchecks AC/HS  Diabetic/cardiac diet  Monitor closely while on prednisone>> increasing daily but cautiously in setting of VIRGILIO    Hyperlipidemia associated with type 2 diabetes mellitus   Patient is chronically on statin.will continue for now. Monitor clinically. Last LDL was   Lab Results   Component Value Date    LDLCALC 43.8 (L) 02/08/2022       Iron deficiency anemia  Patient's anemia is currently controlled.   Current CBC reviewed-   Lab Results   Component Value Date    HGB 10.9 (L) 02/21/2022    HCT 35.4 (L) 02/21/2022     Monitor serial CBC and transfuse if patient becomes hemodynamically unstable, symptomatic or H/H drops below 7/21.     Hypertension associated with diabetes  -Initially hypertensive, improved with home medications.  -Continue coreg 25mg BID, cardizem 240mg daily, hydralazine 100mg TID, ibersartan 300mg qhs.  - cardiac diet    Obstructive sleep apnea  -Continue CPAP qhs    CKD (chronic kidney disease) stage 4, GFR 15-29 ml/min  - see VIRGILIO    Acute respiratory failure with hypoxia and hypercarbia  Patient with acute hypoxemic respiratory failure likely secondary to CHF exacerbation + COPD exacerbation. Patient reporting worsening cough for the last week with green sputum. CXR with stable right pleural fluid. Stable  bilateral reticular opacities suggestive of pulmonary edema or infection.    -Procalcitonin and lactic WNL. Do not suspect PNA  -CTX/ doxy switched to azithromycin for COPD exacerbation + prednisone, scheduled breathing treatments  - diuresis held in setting of VIRGILIO  - wean oxygen as tolerated, currently on RA    Biventricular ICD (implantable cardioverter-defibrillator) in place  Seen on CXR  - stable    Asthma, chronic  -Continue montelukast  -PRN duo nebs      VTE Risk Mitigation (From admission, onward)         Ordered     heparin (porcine) injection 5,000 Units  Every 8 hours         02/22/22 0335     IP VTE HIGH RISK PATIENT  Once         02/22/22 0335     Place sequential compression device  Until discontinued         02/22/22 0335                Discharge Planning   ALIX: 2/26/2022     Code Status: Full Code   Is the patient medically ready for discharge?: No    Reason for patient still in hospital (select all that apply): Patient trending condition, Laboratory test, Treatment and Consult recommendations  Discharge Plan A: Home with family, Home Health                  Jolynn Kemp PA-C  Department of Hospital Medicine   [facility] - Oncology ([facility])

## 2022-02-25 NOTE — ASSESSMENT & PLAN NOTE
Hyponatremia  - Cr 2.3>>3.5, baseline ~2  - suspect prerenal in setting of diuresis  - give 500cc IVF, suspect hypovolemia hyponatremia (corrected Na 129)  - holding home lasix/ losartan  - UA, urine studies, osmol, RP sono ordered  Nephrology consulted for further assistance for persistent hyponatremia and VIRGILIO  - avoid nephrotoxic agents  - monitor with daily labs  - strict I/Os

## 2022-02-25 NOTE — PLAN OF CARE
Patient is up to bedside chair denies any pain or SOB. Appetite has been good for breakfast and lunch. There have been no changes related to the patient's neuro status. She continues on tele with no called related too changes in status. Will continue to observe and address as needed.

## 2022-02-25 NOTE — ASSESSMENT & PLAN NOTE
Worsening SOB with productive cough with green sputum  - Afebrile without leukocytosis.   -Continue supplemental oxygen as needed, weaned to room air  - CTX/Doxy (given report of thick green sputum) and Predinsone x 5d  - scheduled duonebs [id number]  - started mucinex-DM BID, tessalon perles TID PRN cough  -Continue daily inhalers  - monitor for hyperglycemia while on steroids

## 2022-02-25 NOTE — PT/OT/SLP PROGRESS
Physical Therapy Treatment    Patient Name:  Myesha Quinones   MRN:  [id number]    Recommendations:     Discharge Recommendations:  home health PT   Discharge Equipment Recommendations: shower chair, hip kit   Barriers to discharge: None    Assessment:     Myesha Quinones is a 81 y.o. female admitted with a medical diagnosis of Chronic obstructive pulmonary disease with acute exacerbation.  She presents with the following impairments/functional limitations:  weakness, impaired endurance, impaired self care skills, impaired functional mobilty, impaired cardiopulmonary response to activity, gait instability, impaired balance, edema. Myesha Quinones would benefit from acute PT intervention to address listed functional deficits, provide patient/caregiver education, reduce fall risk, and maximize (I) and safety with functional mobility.    Pt tolerated session well. Pt able to ambulate with HHA x 100 ft; limited by fatigue. Remainder of session focused on therex and education. Pt will continue to benefit from acute PT services in order to maximize safety and (I) with functional mobility.    After hospital discharge, pt would benefit from HHPT to continue addressing therapy impairments.    Rehab Prognosis: Good; patient would benefit from acute skilled PT services to address these deficits and reach maximum level of function.      Plan:     During this hospitalization, patient to be seen 3 x/week to address the identified rehab impairments via gait training, therapeutic activities, therapeutic exercises, neuromuscular re-education and progress toward the following goals:    · Plan of Care Expires:  03/22/22    This plan of care has been discussed with the patient/caregiver, who was included in its development and is in agreement with the identified goals and treatment plan.     Subjective     Communicated with RN prior to session.  Patient agreeable to participate.     Chief Complaint: SOB  Patient/Family  Comments/goals: return home  Pt pain prior to session: 0/10  Pt pain following session: 0/10    Objective:     Patient found up in chair with telemetry  upon PT entry to room.    General Precautions: Standard, fall, respiratory   Orthopedic Precautions:N/A   Braces: N/A     Functional Mobility:    Bed Mobility:  · Not assessed 2/2 Pt up in chair    Transfers:   · Sit to Stand Transfer: Stand-by Assistance  from bedside chair with no AD               Gait:  · Patient received gait training ~100 feet with Contact Guard Assistance and HHA  · Gait Assessment: decreased speed, step length, swing through, heel strike, forward flexed posture, and downward gaze.   · PT providing verbal and tactile cues for improved upright posture, gaze direction, and gait fluidity.     Balance:  · Static Sit: Stand-By Assist in bedside chair x ~10 minutes  · Static Stand: Contact-Guard Assist with HHA      Therapeutic Activities/Exercises     Pt performed seated therex:  2 x 20 marches  1 x 10 heel/toe raises  1 x 10 LAQs  *instructed to perform 3x/day  Education on importance of therex for transference to functional mobility  Patient educated on the importance of early mobility to prevent functional decline during hospital stay    Patient was instructed to utilize staff assistance for mobility/transfers.  Patient was educated on PT POC and all questions answered within PT scope of practice.    Patient/caregiver able to verbalize understanding; will follow-up with pt/caregiver during current admit for additional questions/concerns within scope of practice.     White board updated.     AM-PAC 6 CLICK MOBILITY  Total Score:      Patient left up in chair with all lines intact.        History/Goals:     PAST MEDICAL HISTORY:  Past Medical History:   Diagnosis Date    Acute hypoxemic respiratory failure 12/19/2019    Acute right-sided thoracic back pain 12/9/2019    Allergy     Asthma     Basal cell carcinoma     left forehead    Basal  cell carcinoma     left nose    Basal cell carcinoma 05/20/2015    right nose    Basal cell carcinoma 12/22/2015    left lower post neck    Basal cell carcinoma 12/03/2019    left ant scalp     Breast cancer     CAD (coronary artery disease)     Cardiomyopathy     Cardiomyopathy, ischemic     Cataract     CHF (congestive heart failure)     Chronic kidney disease, stage 3, mod decreased GFR 2/14/2017    Colon polyp 2011    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, with long-term current use of insulin 2/22/2018    COPD (chronic obstructive pulmonary disease)     COPD exacerbation 4/8/2018    Current mild episode of major depressive disorder without prior episode 1/25/2022    Defibrillator discharge     Diabetes mellitus     Diabetes mellitus type II     Diabetes with neurologic complications     Goiter     MNG    HX: breast cancer     Hyperlipidemia     Hypertension     Iron deficiency anemia 5/16/2017    Left kidney mass     Meningioma     Microalbuminuria due to type 2 diabetes mellitus 1/26/2022    Osteoporosis, postmenopausal     Pneumonia 12/8/2019    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    Pseudomonas pneumonia     Skin cancer     s/p excision    Sleep apnea     CPAP    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/2015    mid forehead    Unspecified vitamin D deficiency     Ventricular tachycardia     Vitamin B12 deficiency     Vitamin D deficiency disease        Past Surgical History:   Procedure Laterality Date    BASAL CELL CARCINOMA EXCISION      posterior neck and nose    BREAST BIOPSY      BREAST CYST EXCISION Left     BREAST SURGERY      CARDIAC DEFIBRILLATOR PLACEMENT      x 2    CATARACT EXTRACTION W/  INTRAOCULAR LENS IMPLANT Bilateral     CHOLECYSTECTOMY      COLONOSCOPY N/A 11/5/2019    Procedure: COLONOSCOPY;  Surgeon: Boaz Botello MD;  Location: [facility] ([address] [place] [address]);  Service:  Endoscopy;  Laterality: N/A;  AICD - [employer] - sm    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    FRACTURE SURGERY      left elbow and wrist as a child    HYSTERECTOMY      MASTECTOMY Right     REPLACEMENT OF IMPLANTABLE CARDIOVERTER-DEFIBRILLATOR (ICD) GENERATOR N/A 2018    Procedure: REPLACEMENT, ICD GENERATOR;  Surgeon: Jan Mckeon MD;  Location: [facility] EP LAB;  Service: Cardiology;  Laterality: N/A;  DONNA, CRT-D gen sulema, MDT, MAC, SK, 3 Prep    REVISION OF SKIN POCKET FOR CARDIOVERTER-DEFIBRILLATOR  2018    Procedure: Revision, Skin Pocket, For Cardioverter-Defibrillator;  Surgeon: Jan Mckeon MD;  Location: [facility] EP LAB;  Service: Cardiology;;    SQUAMOUS CELL CARCINOMA EXCISION      remved from forehead    TONSILLECTOMY         GOALS:   Multidisciplinary Problems     Physical Therapy Goals        Problem: Physical Therapy Goal    Goal Priority Disciplines Outcome Goal Variances Interventions   Physical Therapy Goal     PT, PT/OT Ongoing, Progressing     Description: Goals to be met by:  3/8/22    Patient will increase functional independence with mobility by performin. Supine to sit with Kemper  2. Sit to supine with Kemper  3. Sit to stand transfer with Supervision  4. Gait  x 50 feet with Contact Guard Assistance using Rolling Walker.   5. Lower extremity exercise program x 20 reps per handout, with independence                       Time Tracking:     PT Received On: 22  PT Start Time: 1018     PT Stop Time: 1042  PT Total Time (min): 24 min     Billable Minutes: Gait Training 9 and Therapeutic Exercise 15      Priscilla Yu, MACKENZIE  2022

## 2022-02-26 LAB
ANION GAP SERPL CALC-SCNC: 14 MMOL/L (ref 8–16)
ANION GAP SERPL CALC-SCNC: 17 MMOL/L (ref 8–16)
BASOPHILS # BLD AUTO: 0.01 K/UL (ref 0–0.2)
BASOPHILS NFR BLD: 0.1 % (ref 0–1.9)
BUN SERPL-MCNC: 86 MG/DL (ref 8–23)
BUN SERPL-MCNC: 90 MG/DL (ref 8–23)
CALCIUM SERPL-MCNC: 8.5 MG/DL (ref 8.7–10.5)
CALCIUM SERPL-MCNC: 8.7 MG/DL (ref 8.7–10.5)
CHLORIDE SERPL-SCNC: 95 MMOL/L (ref 95–110)
CHLORIDE SERPL-SCNC: 96 MMOL/L (ref 95–110)
CO2 SERPL-SCNC: 16 MMOL/L (ref 23–29)
CO2 SERPL-SCNC: 17 MMOL/L (ref 23–29)
CREAT SERPL-MCNC: 3 MG/DL (ref 0.5–1.4)
CREAT SERPL-MCNC: 3.2 MG/DL (ref 0.5–1.4)
DIFFERENTIAL METHOD: ABNORMAL
EOSINOPHIL # BLD AUTO: 0 K/UL (ref 0–0.5)
EOSINOPHIL NFR BLD: 0 % (ref 0–8)
ERYTHROCYTE [DISTWIDTH] IN BLOOD BY AUTOMATED COUNT: 13.1 % (ref 11.5–14.5)
EST. GFR  (AFRICAN AMERICAN): 14.9 ML/MIN/1.73 M^2
EST. GFR  (AFRICAN AMERICAN): 16.1 ML/MIN/1.73 M^2
EST. GFR  (NON AFRICAN AMERICAN): 12.9 ML/MIN/1.73 M^2
EST. GFR  (NON AFRICAN AMERICAN): 13.9 ML/MIN/1.73 M^2
GLUCOSE SERPL-MCNC: 205 MG/DL (ref 70–110)
GLUCOSE SERPL-MCNC: 284 MG/DL (ref 70–110)
HCT VFR BLD AUTO: 30.3 % (ref 37–48.5)
HGB BLD-MCNC: 9.7 G/DL (ref 12–16)
IMM GRANULOCYTES # BLD AUTO: 0.17 K/UL (ref 0–0.04)
IMM GRANULOCYTES NFR BLD AUTO: 1.9 % (ref 0–0.5)
LYMPHOCYTES # BLD AUTO: 0.9 K/UL (ref 1–4.8)
LYMPHOCYTES NFR BLD: 9.8 % (ref 18–48)
MAGNESIUM SERPL-MCNC: 2 MG/DL (ref 1.6–2.6)
MCH RBC QN AUTO: 29.3 PG (ref 27–31)
MCHC RBC AUTO-ENTMCNC: 32 G/DL (ref 32–36)
MCV RBC AUTO: 92 FL (ref 82–98)
MONOCYTES # BLD AUTO: 0.6 K/UL (ref 0.3–1)
MONOCYTES NFR BLD: 6.3 % (ref 4–15)
NEUTROPHILS # BLD AUTO: 7.4 K/UL (ref 1.8–7.7)
NEUTROPHILS NFR BLD: 81.9 % (ref 38–73)
NRBC BLD-RTO: 0 /100 WBC
PLATELET # BLD AUTO: 249 K/UL (ref 150–450)
PMV BLD AUTO: 10.7 FL (ref 9.2–12.9)
POCT GLUCOSE: 165 MG/DL (ref 70–110)
POCT GLUCOSE: 289 MG/DL (ref 70–110)
POCT GLUCOSE: 294 MG/DL (ref 70–110)
POCT GLUCOSE: 302 MG/DL (ref 70–110)
POTASSIUM SERPL-SCNC: 4.8 MMOL/L (ref 3.5–5.1)
POTASSIUM SERPL-SCNC: 4.8 MMOL/L (ref 3.5–5.1)
RBC # BLD AUTO: 3.31 M/UL (ref 4–5.4)
SODIUM SERPL-SCNC: 126 MMOL/L (ref 136–145)
SODIUM SERPL-SCNC: 129 MMOL/L (ref 136–145)
WBC # BLD AUTO: 9.08 K/UL (ref 3.9–12.7)

## 2022-02-26 PROCEDURE — 63600175 PHARM REV CODE 636 W HCPCS: Performed by: NURSE PRACTITIONER

## 2022-02-26 PROCEDURE — 99233 SBSQ HOSP IP/OBS HIGH 50: CPT | Mod: ,,, | Performed by: PHYSICIAN ASSISTANT

## 2022-02-26 PROCEDURE — 99223 PR INITIAL HOSPITAL CARE,LEVL III: ICD-10-PCS | Mod: ,,, | Performed by: INTERNAL MEDICINE

## 2022-02-26 PROCEDURE — 94761 N-INVAS EAR/PLS OXIMETRY MLT: CPT

## 2022-02-26 PROCEDURE — 80048 BASIC METABOLIC PNL TOTAL CA: CPT | Mod: 91 | Performed by: PHYSICIAN ASSISTANT

## 2022-02-26 PROCEDURE — 25000242 PHARM REV CODE 250 ALT 637 W/ HCPCS: Performed by: PHYSICIAN ASSISTANT

## 2022-02-26 PROCEDURE — 80048 BASIC METABOLIC PNL TOTAL CA: CPT | Performed by: NURSE PRACTITIONER

## 2022-02-26 PROCEDURE — 83735 ASSAY OF MAGNESIUM: CPT | Performed by: NURSE PRACTITIONER

## 2022-02-26 PROCEDURE — 36415 COLL VENOUS BLD VENIPUNCTURE: CPT | Performed by: PHYSICIAN ASSISTANT

## 2022-02-26 PROCEDURE — 25000003 PHARM REV CODE 250: Performed by: PHYSICIAN ASSISTANT

## 2022-02-26 PROCEDURE — 25000003 PHARM REV CODE 250: Performed by: NURSE PRACTITIONER

## 2022-02-26 PROCEDURE — 63600175 PHARM REV CODE 636 W HCPCS: Performed by: PHYSICIAN ASSISTANT

## 2022-02-26 PROCEDURE — 27000221 HC OXYGEN, UP TO 24 HOURS

## 2022-02-26 PROCEDURE — 94760 N-INVAS EAR/PLS OXIMETRY 1: CPT

## 2022-02-26 PROCEDURE — 99900035 HC TECH TIME PER 15 MIN (STAT)

## 2022-02-26 PROCEDURE — 94640 AIRWAY INHALATION TREATMENT: CPT

## 2022-02-26 PROCEDURE — 99223 1ST HOSP IP/OBS HIGH 75: CPT | Mod: ,,, | Performed by: INTERNAL MEDICINE

## 2022-02-26 PROCEDURE — 85025 COMPLETE CBC W/AUTO DIFF WBC: CPT | Performed by: NURSE PRACTITIONER

## 2022-02-26 PROCEDURE — 20600001 HC STEP DOWN PRIVATE ROOM

## 2022-02-26 PROCEDURE — 99233 PR SUBSEQUENT HOSPITAL CARE,LEVL III: ICD-10-PCS | Mod: ,,, | Performed by: PHYSICIAN ASSISTANT

## 2022-02-26 PROCEDURE — 94660 CPAP INITIATION&MGMT: CPT

## 2022-02-26 PROCEDURE — 36415 COLL VENOUS BLD VENIPUNCTURE: CPT | Performed by: NURSE PRACTITIONER

## 2022-02-26 RX ORDER — INSULIN ASPART 100 [IU]/ML
10 INJECTION, SOLUTION INTRAVENOUS; SUBCUTANEOUS
Status: DISCONTINUED | OUTPATIENT
Start: 2022-02-26 | End: 2022-02-28

## 2022-02-26 RX ADMIN — IPRATROPIUM BROMIDE AND ALBUTEROL SULFATE 3 ML: 2.5; .5 SOLUTION RESPIRATORY (INHALATION) at 08:02

## 2022-02-26 RX ADMIN — PREDNISONE 40 MG: 20 TABLET ORAL at 09:02

## 2022-02-26 RX ADMIN — INSULIN ASPART 3 UNITS: 100 INJECTION, SOLUTION INTRAVENOUS; SUBCUTANEOUS at 07:02

## 2022-02-26 RX ADMIN — DOXYCYCLINE HYCLATE 100 MG: 100 TABLET, COATED ORAL at 09:02

## 2022-02-26 RX ADMIN — INSULIN ASPART 10 UNITS: 100 INJECTION, SOLUTION INTRAVENOUS; SUBCUTANEOUS at 04:02

## 2022-02-26 RX ADMIN — GUAIFENESIN AND DEXTROMETHORPHAN HYDROBROMIDE 1 TABLET: 600; 30 TABLET, EXTENDED RELEASE ORAL at 08:02

## 2022-02-26 RX ADMIN — FLUTICASONE FUROATE AND VILANTEROL TRIFENATATE 1 PUFF: 100; 25 POWDER RESPIRATORY (INHALATION) at 09:02

## 2022-02-26 RX ADMIN — DOXYCYCLINE HYCLATE 100 MG: 100 TABLET, COATED ORAL at 08:02

## 2022-02-26 RX ADMIN — CEFTRIAXONE 1 G: 1 INJECTION, SOLUTION INTRAVENOUS at 12:02

## 2022-02-26 RX ADMIN — INSULIN ASPART 10 UNITS: 100 INJECTION, SOLUTION INTRAVENOUS; SUBCUTANEOUS at 12:02

## 2022-02-26 RX ADMIN — Medication 250 MG: at 09:02

## 2022-02-26 RX ADMIN — DILTIAZEM HYDROCHLORIDE 240 MG: 120 CAPSULE, COATED, EXTENDED RELEASE ORAL at 09:02

## 2022-02-26 RX ADMIN — INSULIN ASPART 4 UNITS: 100 INJECTION, SOLUTION INTRAVENOUS; SUBCUTANEOUS at 04:02

## 2022-02-26 RX ADMIN — HYDRALAZINE HYDROCHLORIDE 100 MG: 25 TABLET, FILM COATED ORAL at 09:02

## 2022-02-26 RX ADMIN — HEPARIN SODIUM 5000 UNITS: 5000 INJECTION INTRAVENOUS; SUBCUTANEOUS at 09:02

## 2022-02-26 RX ADMIN — HEPARIN SODIUM 5000 UNITS: 5000 INJECTION INTRAVENOUS; SUBCUTANEOUS at 05:02

## 2022-02-26 RX ADMIN — CETIRIZINE HYDROCHLORIDE 10 MG: 5 TABLET ORAL at 09:02

## 2022-02-26 RX ADMIN — HYDRALAZINE HYDROCHLORIDE 100 MG: 25 TABLET, FILM COATED ORAL at 02:02

## 2022-02-26 RX ADMIN — GUAIFENESIN AND DEXTROMETHORPHAN HYDROBROMIDE 1 TABLET: 600; 30 TABLET, EXTENDED RELEASE ORAL at 09:02

## 2022-02-26 RX ADMIN — INSULIN ASPART 2 UNITS: 100 INJECTION, SOLUTION INTRAVENOUS; SUBCUTANEOUS at 12:02

## 2022-02-26 RX ADMIN — HYDRALAZINE HYDROCHLORIDE 100 MG: 25 TABLET, FILM COATED ORAL at 08:02

## 2022-02-26 RX ADMIN — PRAVASTATIN SODIUM 40 MG: 40 TABLET ORAL at 09:02

## 2022-02-26 RX ADMIN — FLUTICASONE PROPIONATE 100 MCG: 50 SPRAY, METERED NASAL at 09:02

## 2022-02-26 RX ADMIN — MONTELUKAST 10 MG: 10 TABLET, FILM COATED ORAL at 08:02

## 2022-02-26 RX ADMIN — CHOLECALCIFEROL TAB 25 MCG (1000 UNIT) 2000 UNITS: 25 TAB at 09:02

## 2022-02-26 RX ADMIN — HEPARIN SODIUM 5000 UNITS: 5000 INJECTION INTRAVENOUS; SUBCUTANEOUS at 02:02

## 2022-02-26 RX ADMIN — TIOTROPIUM BROMIDE INHALATION SPRAY 2 PUFF: 3.12 SPRAY, METERED RESPIRATORY (INHALATION) at 09:02

## 2022-02-26 RX ADMIN — CARVEDILOL 25 MG: 25 TABLET, FILM COATED ORAL at 09:02

## 2022-02-26 RX ADMIN — CARVEDILOL 25 MG: 25 TABLET, FILM COATED ORAL at 08:02

## 2022-02-26 RX ADMIN — IPRATROPIUM BROMIDE AND ALBUTEROL SULFATE 3 ML: 2.5; .5 SOLUTION RESPIRATORY (INHALATION) at 01:02

## 2022-02-26 RX ADMIN — ACETAMINOPHEN 650 MG: 325 TABLET ORAL at 12:02

## 2022-02-26 NOTE — ASSESSMENT & PLAN NOTE
81 y.o. female with a PMHx of CAD, ischemic cardiomyopathy with LBBB s/p biventricular ICD, CHF (EF 50%, global hypokinesis, grade II diastolic dysfunction, evidence of pulmonary HTN), CKD4, IDDM (A1c 5.9), breast cancer, HTN, asthma, COPD, and HLD who is being treated for COPD/CHF exacerbation. Cr elevation to 3.2 from baseline on 2.1. Increased UPr to 6.85 from previous UPCr 5.33. Metabolic acidosis, increased BUN. Retroperitoneal US shows changes of medical renal disease, simple to minimally complex cysts. FeUrea of 21.1, indicates likely pre-renal from diuretics and volume loss. I/O net negative 2 L.   Lab Results   Component Value Date    CREATININE 3.2 (H) 02/26/2022     - Urine microscopy on Monday if Cr still elevated.   - Strict I&Os and daily weights   - BID BMP  - Trend sCr  - Avoid nephrotoxic agents such as NSAIDs, gadolinium, ACEi, ARBs and IV radiocontrast.  - Renally dose meds to current GFR.  - Maintain MAP > 65.  - No indications for HD at this time.   - Maintain electrolytes at goal: Mg > 2, Phos > 3, K > 4.

## 2022-02-26 NOTE — CONSULTS
[facility] (Hospital)  Nephrology  Consult Note    Patient Name: Myesha Quinones  MRN: [id number]  Admission Date: 2/21/2022  Hospital Length of Stay: 4 days  Attending Provider: Estefanía Goncalves MD   Primary Care Physician: Catrachita Rothman MD  Principal Problem:Chronic obstructive pulmonary disease with acute exacerbation    Inpatient consult to Nephrology  Consult performed by: Jose Alberto Castillo MD  Consult ordered by: Jolynn Kemp PA-C        Assessment/Plan:     Acute hyponatremia  82 yo F with acute onset hyponatremia and VIRGILIO on CKD IV. Hyponatremia of 124 on 2/26, trending down from 136 > 132 >129. Asymptomatic. Patient reports drinking about three 16 oz bottles of water daily at home, and has been drinking 3-4 cups of water per day while in the hospital. Likely not primary polydipsia. Urine Na elevated at 50, likely from VIRGILIO and diuretics. Serum osm 300, urine osm 280. Hyperglycemia present with steroids, but corrected Na still low at 126. TSH WNL. Likely hypovolemic hyponatremia, diuretic induced and from renal losses.      - Recommend fluid restriction of 1 to 1.5 L   - Will consider Lasix drip if Na continues to trend down and inadequate UOP for preferential water over Na diuresis.   - Will consider tolvaptan if Na decreases to low 120s without improvement in Cr, poor UOP and if BG better controlled  - Hold Lasix bolus doses.   - Recommend improved blood glucose control, goal 140-180 inpatient.   - BID BMP  - Monitor UOP without lasix for now. Should be around 30 cc/hour.   - No salt tablets or hypertonic saline at this time.  - Goal increase sodium 4-6 mM/24h    Acute renal failure superimposed on stage 4 chronic kidney disease  81 y.o. female with a PMHx of CAD, ischemic cardiomyopathy with LBBB s/p biventricular ICD, CHF (EF 50%, global hypokinesis, grade II diastolic dysfunction, evidence of pulmonary HTN), CKD4, IDDM (A1c 5.9), breast cancer, HTN, asthma, COPD, and HLD who is being  treated for COPD/CHF exacerbation. Cr elevation to 3.2 from baseline on 2.1. Increased UPr to 6.85 from previous UPCr 5.33. Metabolic acidosis, increased BUN. Retroperitoneal US shows changes of medical renal disease, simple to minimally complex cysts. FeUrea of 21.1, indicates likely pre-renal from diuretics and volume loss. I/O net negative 2 L.   Lab Results   Component Value Date    CREATININE 3.2 (H) 02/26/2022     - Strict I&Os and daily weights   - BID BMP  - Trend sCr  - Avoid nephrotoxic agents such as NSAIDs, gadolinium, ACEi, ARBs and IV radiocontrast.  - Renally dose meds to current GFR.  - Maintain MAP > 65.  - No indications for HD at this time.   - Maintain electrolytes at goal: Mg > 2, Phos > 3, K > 4.      Thank you for your consult. I will follow-up with patient. Please contact us if you have any additional questions.        Subjective:     HPI: Myesha Quinones is a 81 y.o. female with a PMHx of CAD, ischemic cardiomyopathy with LBBB s/p biventricular ICD, CHF (EF 50%, global hypokinesis, grade II diastolic dysfunction, evidence of pulmonary HTN), CKD4, IDDM (A1c 5.9), breast cancer, HTN, asthma, COPD, and HLD who presents to the ED with complaints of shortness of breath and green sputum production likely due to COPD/CHF exacerbation. Being treated with albuterol nebs, CTX/doxycycline, mucinex, prednisone for 5 days, tiotropium, fluticasone/vilanterol by hospital medicine. Her blood glucose has been more elevated since starting the steroids.     Her SOB is worse with exertion. She has received Lasix in the ED and 20 mg BID until 2/22. Since then her Cr trended up from Baseline 2.1 to 3.2. Concern for overdiureses, so small fluid bolus 500 cc was given on 2/24. She is already established with Dr. Tanner at nephrology clinic, diagnosed with stable CKD stage IV 2/2 diabetic nephropathy with UPCr of 3.13 to 5.33 previously. UPCr now 6.78. Urine Na 50, urine Cr 46, urine urea 227, urine K 30.  Retroperitoneal US shows medical renal disease, simple to minimally complex cysts.     Nephrology has consulted for hyponatremia, VIRGILIO on CKD IV, acidosis, and proteinuria.              Past Medical History:   Diagnosis Date    Acute hypoxemic respiratory failure 12/19/2019    Acute right-sided thoracic back pain 12/9/2019    Allergy     Asthma     Basal cell carcinoma     left forehead    Basal cell carcinoma     left nose    Basal cell carcinoma 05/20/2015    right nose    Basal cell carcinoma 12/22/2015    left lower post neck    Basal cell carcinoma 12/03/2019    left ant scalp     Breast cancer     CAD (coronary artery disease)     Cardiomyopathy     Cardiomyopathy, ischemic     Cataract     CHF (congestive heart failure)     Chronic kidney disease, stage 3, mod decreased GFR 2/14/2017    Colon polyp 2011    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, with long-term current use of insulin 2/22/2018    COPD (chronic obstructive pulmonary disease)     COPD exacerbation 4/8/2018    Current mild episode of major depressive disorder without prior episode 1/25/2022    Defibrillator discharge     Diabetes mellitus     Diabetes mellitus type II     Diabetes with neurologic complications     Goiter     MNG    HX: breast cancer     Hyperlipidemia     Hypertension     Iron deficiency anemia 5/16/2017    Left kidney mass     Meningioma     Microalbuminuria due to type 2 diabetes mellitus 1/26/2022    Osteoporosis, postmenopausal     Pneumonia 12/8/2019    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    Pseudomonas pneumonia     Skin cancer     s/p excision    Sleep apnea     CPAP    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/2015    mid forehead    Unspecified vitamin D deficiency     Ventricular tachycardia     Vitamin B12 deficiency     Vitamin D deficiency disease        Past Surgical History:   Procedure Laterality  Date    BASAL CELL CARCINOMA EXCISION      posterior neck and nose    BREAST BIOPSY      BREAST CYST EXCISION Left     BREAST SURGERY      CARDIAC DEFIBRILLATOR PLACEMENT      x 2    CATARACT EXTRACTION W/  INTRAOCULAR LENS IMPLANT Bilateral     CHOLECYSTECTOMY      COLONOSCOPY N/A 11/5/2019    Procedure: COLONOSCOPY;  Surgeon: Boaz Botello MD;  Location: [facility] ENDO ([address] [place] [address]);  Service: Endoscopy;  Laterality: N/A;  AICD - [employer] - sm    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    FRACTURE SURGERY      left elbow and wrist as a child    HYSTERECTOMY      MASTECTOMY Right     REPLACEMENT OF IMPLANTABLE CARDIOVERTER-DEFIBRILLATOR (ICD) GENERATOR N/A 12/17/2018    Procedure: REPLACEMENT, ICD GENERATOR;  Surgeon: Jan Mckeon MD;  Location: [facility] EP LAB;  Service: Cardiology;  Laterality: N/A;  DONNA, CRT-D gen sulema, MDT, MAC, SK, 3 Prep    REVISION OF SKIN POCKET FOR CARDIOVERTER-DEFIBRILLATOR  12/17/2018    Procedure: Revision, Skin Pocket, For Cardioverter-Defibrillator;  Surgeon: Jan Mckeon MD;  Location: [facility] EP LAB;  Service: Cardiology;;    SQUAMOUS CELL CARCINOMA EXCISION      remved from forehead    TONSILLECTOMY         Review of patient's allergies indicates:   Allergen Reactions    Iodine and iodide containing products Hives    Nifedipine      weakness     Current Facility-Administered Medications   Medication Frequency    acetaminophen tablet 650 mg Q6H PRN    albuterol-ipratropium 2.5 mg-0.5 mg/3 mL nebulizer solution 3 mL Q4H PRN    albuterol-ipratropium 2.5 mg-0.5 mg/3 mL nebulizer solution 3 mL Q6H WAKE    ascorbic acid (vitamin C) tablet 250 mg Daily    benzonatate capsule 100 mg TID PRN    carvediloL tablet 25 mg BID    cefTRIAXone (ROCEPHIN) 1 g/50 mL D5W IVPB Q24H    cetirizine tablet 10 mg Daily    cloNIDine 0.1 mg/24 hr td ptwk 1 patch Every Tues    dextromethorphan-guaiFENesin  mg per 12 hr tablet 1 tablet BID    dextrose 10% bolus 125 mL PRN     dextrose 10% bolus 250 mL PRN    diltiaZEM 24 hr capsule 240 mg Daily    doxycycline tablet 100 mg Q12H    fluticasone furoate-vilanteroL 100-25 mcg/dose diskus inhaler 1 puff Daily    fluticasone propionate 50 mcg/actuation nasal spray 100 mcg Daily    glucagon (human recombinant) injection 1 mg PRN    glucose chewable tablet 16 g PRN    glucose chewable tablet 24 g PRN    heparin (porcine) injection 5,000 Units Q8H    hydrALAZINE tablet 100 mg TID    insulin aspart U-100 pen 0-5 Units QID (AC + HS) PRN    insulin aspart U-100 pen 3 Units TIDWM    insulin detemir U-100 pen 18 Units Daily    montelukast tablet 10 mg QHS    nitroGLYCERIN SL tablet 0.4 mg Q5 Min PRN    pravastatin tablet 40 mg Daily    predniSONE tablet 40 mg Daily    sodium chloride 0.9% flush 10 mL PRN    tiotropium bromide 2.5 mcg/actuation inhaler 2 puff Daily    vitamin D 1000 units tablet 2,000 Units Daily     Family History       Problem Relation (Age of Onset)    Asthma Mother    Cancer Brother, Son, Daughter    Diabetes Father, Sister, Brother, Brother, Brother, Brother, Son, Daughter, Son    Heart disease Father, Sister, Brother, Brother, Brother    Hypertension Brother, Brother, Mother    Melanoma Daughter    No Known Problems Maternal Grandmother, Maternal Grandfather, Paternal Grandmother, Paternal Grandfather    Obesity Daughter    Stroke Mother          Tobacco Use    Smoking status: Never Smoker    Smokeless tobacco: Never Used   Substance and Sexual Activity    Alcohol use: No     Alcohol/week: 0.0 standard drinks    Drug use: No    Sexual activity: Yes     Partners: Male     Review of Systems   Constitutional:  Positive for fatigue. Negative for chills and fever.   HENT:  Negative for congestion and rhinorrhea.    Respiratory:  Positive for cough, shortness of breath and wheezing. Negative for chest tightness.    Cardiovascular:  Negative for chest pain, palpitations and leg swelling.   Gastrointestinal:   Negative for abdominal distention and abdominal pain.   Endocrine: Negative for polydipsia.   Genitourinary:  Negative for decreased urine volume, difficulty urinating, dysuria, flank pain and frequency.   Musculoskeletal:  Negative for arthralgias and back pain.   Neurological:  Negative for dizziness, numbness and headaches.   Psychiatric/Behavioral:  Negative for agitation and behavioral problems.    Objective:     Vital Signs (Most Recent):  Temp: 98.1 °F (36.7 °C) (02/26/22 0426)  Pulse: 71 (02/26/22 0426)  Resp: 20 (02/26/22 0426)  BP: 138/63 (02/26/22 0426)  SpO2: 95 % (02/26/22 0426)  O2 Device (Oxygen Therapy): CPAP (02/26/22 0426) Vital Signs (24h Range):  Temp:  [97.7 °F (36.5 °C)-98.1 °F (36.7 °C)] 98.1 °F (36.7 °C)  Pulse:  [61-87] 71  Resp:  [16-21] 20  SpO2:  [91 %-98 %] 95 %  BP: (121-179)/(60-77) 138/63     Weight: 64.4 kg (141 lb 15.6 oz) (02/25/22 0534)  Body mass index is 25.15 kg/m².  Body surface area is 1.69 meters squared.    I/O last 3 completed shifts:  In: 1302 [P.O.:1302]  Out: 1350 [Urine:1350]    Physical Exam  Vitals and nursing note reviewed.   Constitutional:       General: She is not in acute distress.     Appearance: Normal appearance. She is not ill-appearing.   HENT:      Head: Normocephalic and atraumatic.      Nose: Nose normal.   Eyes:      Extraocular Movements: Extraocular movements intact.   Cardiovascular:      Rate and Rhythm: Normal rate and regular rhythm.      Pulses: Normal pulses.      Heart sounds: Normal heart sounds.   Pulmonary:      Effort: Pulmonary effort is normal. No respiratory distress.      Breath sounds: Wheezing (mild) present. No rales.      Comments: Speaking in full sentences ORA   Abdominal:      General: Abdomen is flat. There is no distension.      Palpations: Abdomen is soft.      Tenderness: There is no abdominal tenderness. There is no guarding.   Musculoskeletal:         General: Normal range of motion.      Cervical back: Normal range of  motion.      Right lower leg: Edema present.      Left lower leg: Edema present.      Comments: RLE > LLE, chronic   Skin:     General: Skin is warm and dry.      Capillary Refill: Capillary refill takes less than 2 seconds.      Coloration: Skin is not jaundiced.   Neurological:      General: No focal deficit present.      Mental Status: She is alert and oriented to person, place, and time.   Psychiatric:         Mood and Affect: Mood normal.         Behavior: Behavior normal.       Significant Labs:  CBC:   Recent Labs   Lab 02/26/22  0335   WBC 9.08   RBC 3.31*   HGB 9.7*   HCT 30.3*      MCV 92   MCH 29.3   MCHC 32.0     CMP:   Recent Labs   Lab 02/25/22  0911 02/26/22  0335   GLU  --  284*   CALCIUM  --  8.5*   ALBUMIN 3.0*  --    PROT 6.8  --    NA  --  129*   K  --  4.8   CO2  --  16*   CL  --  96   BUN  --  86*   CREATININE  --  3.2*   ALKPHOS 100  --    ALT 19  --    AST 19  --    BILITOT 0.3  --      All labs within the past 24 hours have been reviewed.    Significant Imaging:  US: Reviewed      Jose Alberto Castillo MD  Nephrology  [facility] - Oncology ([facility])

## 2022-02-26 NOTE — PLAN OF CARE
POC reviewed with patient. Falls and safety precautions in place. Pt UAL with weak gait, using walker to ambulate. Pt remained afebrile overnight. No signs or symptoms of active bleeding. Pt put on 2L NC overnight for SpO2 decrease. Pt complained of slight headache. Tylenol given PRN per MAR. Pt adherent to 1500mL fluid restriction. Monitoring morning labs, will replete electrolytes as needed. Pt resting comfortably in bed. Will continue to monitor.

## 2022-02-26 NOTE — ASSESSMENT & PLAN NOTE
81 y.o. female with a PMHx of CAD, ischemic cardiomyopathy with LBBB s/p biventricular ICD, CHF (EF 50%, global hypokinesis, grade II diastolic dysfunction, evidence of pulmonary HTN), CKD4, IDDM (A1c 5.9), breast cancer, HTN, asthma, COPD, and HLD who is being treated for COPD/CHF exacerbation. Cr elevation to 3.2 from baseline on 2.1. Increased UPr to 6.85 from previous UPCr 5.33. Metabolic acidosis, increased BUN. Retroperitoneal US shows changes of medical renal disease, simple to minimally complex cysts. FeUrea of 21.1, indicates likely pre-renal from diuretics and volume loss. I/O net negative 2 L.   Lab Results   Component Value Date    CREATININE 3.2 (H) 02/26/2022     - Strict I&Os and daily weights   - BID BMP  - Trend sCr  - Avoid nephrotoxic agents such as NSAIDs, gadolinium, ACEi, ARBs and IV radiocontrast.  - Renally dose meds to current GFR.  - Maintain MAP > 65.  - No indications for HD at this time.   - Maintain electrolytes at goal: Mg > 2, Phos > 3, K > 4.

## 2022-02-26 NOTE — PROGRESS NOTES
[facility] - Oncology ([facility])  Nephrology  Progress Note    Patient Name: Myesha Quinones  MRN: [id number]  Admission Date: 2/21/2022  Hospital Length of Stay: 4 days  Attending Provider: Estefanía Goncalves MD   Primary Care Physician: Catrachita Rothman MD  Principal Problem:Acute renal failure superimposed on stage 4 chronic kidney disease    Subjective:     HPI: Myesha Quinones is a 81 y.o. female with a PMHx of CAD, ischemic cardiomyopathy with LBBB s/p biventricular ICD, CHF (EF 50%, global hypokinesis, grade II diastolic dysfunction, evidence of pulmonary HTN), CKD4, IDDM (A1c 5.9), breast cancer, HTN, asthma, COPD, and HLD who presents to the ED with complaints of shortness of breath and green sputum production likely due to COPD/CHF exacerbation. Being treated with albuterol nebs, CTX/doxycycline, mucinex, prednisone for 5 days, tiotropium, fluticasone/vilanterol by hospital medicine. Her blood glucose has been more elevated since starting the steroids.     Her SOB is worse with exertion. She has received Lasix in the ED and 20 mg BID until 2/22. Since then her Cr trended up from Baseline 2.1 to 3.2. Concern for overdiureses, so small fluid bolus 500 cc was given on 2/24. She is already established with Dr. Tanner at nephrology clinic, diagnosed with stable CKD stage IV 2/2 diabetic nephropathy with UPCr of 3.13 to 5.33 previously. UPCr now 6.78. Urine Na 50, urine Cr 46, urine urea 227, urine K 30. Retroperitoneal US shows medical renal disease, simple to minimally complex cysts.     Nephrology has consulted for hyponatremia, VIRGILIO on CKD IV, acidosis, and proteinuria.              Interval History: NAOE, urinating 2 L without diuretic, PO intake 1.3 L. Serum Na improved, corrected Na 132, still hyperglycemic 284. Cr stable at 3.2.                                       Review of patient's allergies indicates:   Allergen Reactions    Iodine and iodide containing products Hives    Nifedipine       weakness     Current Facility-Administered Medications   Medication Frequency    acetaminophen tablet 650 mg Q6H PRN    albuterol-ipratropium 2.5 mg-0.5 mg/3 mL nebulizer solution 3 mL Q4H PRN    albuterol-ipratropium 2.5 mg-0.5 mg/3 mL nebulizer solution 3 mL Q6H WAKE    ascorbic acid (vitamin C) tablet 250 mg Daily    benzonatate capsule 100 mg TID PRN    carvediloL tablet 25 mg BID    cefTRIAXone (ROCEPHIN) 1 g/50 mL D5W IVPB Q24H    cetirizine tablet 10 mg Daily    cloNIDine 0.1 mg/24 hr td ptwk 1 patch Every Tues    dextromethorphan-guaiFENesin  mg per 12 hr tablet 1 tablet BID    dextrose 10% bolus 125 mL PRN    dextrose 10% bolus 250 mL PRN    diltiaZEM 24 hr capsule 240 mg Daily    doxycycline tablet 100 mg Q12H    fluticasone furoate-vilanteroL 100-25 mcg/dose diskus inhaler 1 puff Daily    fluticasone propionate 50 mcg/actuation nasal spray 100 mcg Daily    glucagon (human recombinant) injection 1 mg PRN    glucose chewable tablet 16 g PRN    glucose chewable tablet 24 g PRN    heparin (porcine) injection 5,000 Units Q8H    hydrALAZINE tablet 100 mg TID    insulin aspart U-100 pen 0-5 Units QID (AC + HS) PRN    insulin aspart U-100 pen 10 Units TIDWM    insulin detemir U-100 pen 20 Units BID    montelukast tablet 10 mg QHS    nitroGLYCERIN SL tablet 0.4 mg Q5 Min PRN    pravastatin tablet 40 mg Daily    sodium chloride 0.9% flush 10 mL PRN    tiotropium bromide 2.5 mcg/actuation inhaler 2 puff Daily    vitamin D 1000 units tablet 2,000 Units Daily       Objective:     Vital Signs (Most Recent):  Temp: 96 °F (35.6 °C) (02/26/22 0745)  Pulse: 78 (02/26/22 1322)  Resp: 16 (02/26/22 1322)  BP: (!) 156/70 (02/26/22 0745)  SpO2: 98 % (02/26/22 1322)  O2 Device (Oxygen Therapy): nasal cannula (02/26/22 1322)   Vital Signs (24h Range):  Temp:  [96 °F (35.6 °C)-98.1 °F (36.7 °C)] 96 °F (35.6 °C)  Pulse:  [61-87] 78  Resp:  [16-21] 16  SpO2:  [91 %-98 %] 98 %  BP:  (121-179)/(60-77) 156/70     Weight: 64.5 kg (142 lb 3.2 oz) (02/26/22 0500)  Body mass index is 25.19 kg/m².  Body surface area is 1.69 meters squared.    I/O last 3 completed shifts:  In: 1542 [P.O.:1542]  Out: 2050 [Urine:2050]    Physical Exam  Vitals and nursing note reviewed.   Constitutional:       General: She is not in acute distress.     Appearance: Normal appearance. She is not ill-appearing.   HENT:      Head: Normocephalic and atraumatic.      Nose: Nose normal.   Eyes:      Extraocular Movements: Extraocular movements intact.   Cardiovascular:      Rate and Rhythm: Normal rate and regular rhythm.      Pulses: Normal pulses.      Heart sounds: Normal heart sounds.   Pulmonary:      Effort: Pulmonary effort is normal. No respiratory distress.      Breath sounds: Wheezing (mild) present. No rales.   Abdominal:      General: Abdomen is flat. There is no distension.      Palpations: Abdomen is soft.      Tenderness: There is no abdominal tenderness. There is no guarding.   Musculoskeletal:         General: Normal range of motion.      Cervical back: Normal range of motion.      Right lower leg: No edema.      Left lower leg: No edema.      Comments: RLE swelling chronic   Skin:     General: Skin is warm and dry.      Capillary Refill: Capillary refill takes less than 2 seconds.      Coloration: Skin is not jaundiced.   Neurological:      General: No focal deficit present.      Mental Status: She is alert and oriented to person, place, and time.   Psychiatric:         Mood and Affect: Mood normal.         Behavior: Behavior normal.       Significant Labs:  CBC:   Recent Labs   Lab 02/26/22  0335   WBC 9.08   RBC 3.31*   HGB 9.7*   HCT 30.3*      MCV 92   MCH 29.3   MCHC 32.0     CMP:   Recent Labs   Lab 02/25/22  0911 02/26/22  0335   GLU  --  284*   CALCIUM  --  8.5*   ALBUMIN 3.0*  --    PROT 6.8  --    NA  --  129*   K  --  4.8   CO2  --  16*   CL  --  96   BUN  --  86*   CREATININE  --  3.2*    ALKPHOS 100  --    ALT 19  --    AST 19  --    BILITOT 0.3  --      All labs within the past 24 hours have been reviewed.     Significant Imaging:  Labs: Reviewed      Assessment/Plan:     * Acute renal failure superimposed on stage 4 chronic kidney disease  81 y.o. female with a PMHx of CAD, ischemic cardiomyopathy with LBBB s/p biventricular ICD, CHF (EF 50%, global hypokinesis, grade II diastolic dysfunction, evidence of pulmonary HTN), CKD4, IDDM (A1c 5.9), breast cancer, HTN, asthma, COPD, and HLD who is being treated for COPD/CHF exacerbation. Cr elevation to 3.2 from baseline on 2.1. Increased UPr to 6.85 from previous UPCr 5.33. Metabolic acidosis, increased BUN. Retroperitoneal US shows changes of medical renal disease, simple to minimally complex cysts. FeUrea of 21.1, indicates likely pre-renal from diuretics and volume loss. I/O net negative 2 L.   Lab Results   Component Value Date    CREATININE 3.2 (H) 02/26/2022     - Urine microscopy on Monday if Cr still elevated.   - Strict I&Os and daily weights   - BID BMP  - Trend sCr  - Avoid nephrotoxic agents such as NSAIDs, gadolinium, ACEi, ARBs and IV radiocontrast.  - Renally dose meds to current GFR.  - Maintain MAP > 65.  - No indications for HD at this time.   - Maintain electrolytes at goal: Mg > 2, Phos > 3, K > 4.      Acute hyponatremia  80 yo F with acute onset hyponatremia and VIRGILIO on CKD IV. Hyponatremia of 124 on 2/26, trending down from 136 > 132 >129. Asymptomatic. Patient reports drinking about three 16 oz bottles of water daily at home, and has been drinking 3-4 cups of water per day while in the hospital. Likely not primary polydipsia. Urine Na elevated at 50, likely from VIRGILIO and diuretics. Serum osm 300, urine osm 280. Hyperglycemia present with steroids, but corrected Na still low at 126. TSH WNL. Likely hypovolemic hyponatremia, diuretic induced and from renal losses. Improved. Euvolemic.     - Recommend fluid restriction of 1 to 1.5 L    - No Lasix at this time  - No need for tolvaptan at this time  - Still hyperglycemic, Recommend improved blood glucose control, goal 140-180 inpatient.   - Monitor UOP without lasix for now. Should be around 30 cc/hour.   - No salt tablets or hypertonic saline at this time.  - Goal increase sodium 4-6 mM/24h    Proteinuria  Proteinuria of 6.78, increased from previous UPCr of 5.33. Likely 2/2 to longstanding diabetic nephropathy. DM well controlled now compared to before.     - Consider follow up with rheum outpatient for previous positive ZURDO      Thank you for your consult. I will follow-up with patient. Please contact us if you have any additional questions.    Jose Alberto Castillo MD  Nephrology  [facility] - On[facility] ([facility])

## 2022-02-26 NOTE — SUBJECTIVE & OBJECTIVE
Interval History: NAOE, urinating 2 L without diuretic, PO intake 1.3 L. Serum Na improved, corrected Na 132, still hyperglycemic 284. Cr stable at 3.2.                                       Review of patient's allergies indicates:   Allergen Reactions    Iodine and iodide containing products Hives    Nifedipine      weakness     Current Facility-Administered Medications   Medication Frequency    acetaminophen tablet 650 mg Q6H PRN    albuterol-ipratropium 2.5 mg-0.5 mg/3 mL nebulizer solution 3 mL Q4H PRN    albuterol-ipratropium 2.5 mg-0.5 mg/3 mL nebulizer solution 3 mL Q6H WAKE    ascorbic acid (vitamin C) tablet 250 mg Daily    benzonatate capsule 100 mg TID PRN    carvediloL tablet 25 mg BID    cefTRIAXone (ROCEPHIN) 1 g/50 mL D5W IVPB Q24H    cetirizine tablet 10 mg Daily    cloNIDine 0.1 mg/24 hr td ptwk 1 patch Every Tues    dextromethorphan-guaiFENesin  mg per 12 hr tablet 1 tablet BID    dextrose 10% bolus 125 mL PRN    dextrose 10% bolus 250 mL PRN    diltiaZEM 24 hr capsule 240 mg Daily    doxycycline tablet 100 mg Q12H    fluticasone furoate-vilanteroL 100-25 mcg/dose diskus inhaler 1 puff Daily    fluticasone propionate 50 mcg/actuation nasal spray 100 mcg Daily    glucagon (human recombinant) injection 1 mg PRN    glucose chewable tablet 16 g PRN    glucose chewable tablet 24 g PRN    heparin (porcine) injection 5,000 Units Q8H    hydrALAZINE tablet 100 mg TID    insulin aspart U-100 pen 0-5 Units QID (AC + HS) PRN    insulin aspart U-100 pen 10 Units TIDWM    insulin detemir U-100 pen 20 Units BID    montelukast tablet 10 mg QHS    nitroGLYCERIN SL tablet 0.4 mg Q5 Min PRN    pravastatin tablet 40 mg Daily    sodium chloride 0.9% flush 10 mL PRN    tiotropium bromide 2.5 mcg/actuation inhaler 2 puff Daily    vitamin D 1000 units tablet 2,000 Units Daily       Objective:     Vital Signs (Most Recent):  Temp: 96 °F (35.6 °C) (02/26/22 0745)  Pulse: 78 (02/26/22 1322)  Resp: 16 (02/26/22  1322)  BP: (!) 156/70 (02/26/22 0745)  SpO2: 98 % (02/26/22 1322)  O2 Device (Oxygen Therapy): nasal cannula (02/26/22 1322)   Vital Signs (24h Range):  Temp:  [96 °F (35.6 °C)-98.1 °F (36.7 °C)] 96 °F (35.6 °C)  Pulse:  [61-87] 78  Resp:  [16-21] 16  SpO2:  [91 %-98 %] 98 %  BP: (121-179)/(60-77) 156/70     Weight: 64.5 kg (142 lb 3.2 oz) (02/26/22 0500)  Body mass index is 25.19 kg/m².  Body surface area is 1.69 meters squared.    I/O last 3 completed shifts:  In: 1542 [P.O.:1542]  Out: 2050 [Urine:2050]    Physical Exam  Vitals and nursing note reviewed.   Constitutional:       General: She is not in acute distress.     Appearance: Normal appearance. She is not ill-appearing.   HENT:      Head: Normocephalic and atraumatic.      Nose: Nose normal.   Eyes:      Extraocular Movements: Extraocular movements intact.   Cardiovascular:      Rate and Rhythm: Normal rate and regular rhythm.      Pulses: Normal pulses.      Heart sounds: Normal heart sounds.   Pulmonary:      Effort: Pulmonary effort is normal. No respiratory distress.      Breath sounds: Wheezing (mild) present. No rales.   Abdominal:      General: Abdomen is flat. There is no distension.      Palpations: Abdomen is soft.      Tenderness: There is no abdominal tenderness. There is no guarding.   Musculoskeletal:         General: Normal range of motion.      Cervical back: Normal range of motion.      Right lower leg: No edema.      Left lower leg: No edema.      Comments: RLE swelling chronic   Skin:     General: Skin is warm and dry.      Capillary Refill: Capillary refill takes less than 2 seconds.      Coloration: Skin is not jaundiced.   Neurological:      General: No focal deficit present.      Mental Status: She is alert and oriented to person, place, and time.   Psychiatric:         Mood and Affect: Mood normal.         Behavior: Behavior normal.       Significant Labs:  CBC:   Recent Labs   Lab 02/26/22  0335   WBC 9.08   RBC 3.31*   HGB 9.7*    HCT 30.3*      MCV 92   MCH 29.3   MCHC 32.0     CMP:   Recent Labs   Lab 02/25/22  0911 02/26/22  0335   GLU  --  284*   CALCIUM  --  8.5*   ALBUMIN 3.0*  --    PROT 6.8  --    NA  --  129*   K  --  4.8   CO2  --  16*   CL  --  96   BUN  --  86*   CREATININE  --  3.2*   ALKPHOS 100  --    ALT 19  --    AST 19  --    BILITOT 0.3  --      All labs within the past 24 hours have been reviewed.     Significant Imaging:  Labs: Reviewed

## 2022-02-26 NOTE — ASSESSMENT & PLAN NOTE
82 yo F with acute onset hyponatremia and VIRGILIO on CKD IV. Hyponatremia of 124 on 2/26, trending down from 136 > 132 >129. Asymptomatic. Patient reports drinking about three 16 oz bottles of water daily at home, and has been drinking 3-4 cups of water per day while in the hospital. Likely not primary polydipsia. Urine Na elevated at 50, likely from VIRGILIO and diuretics. Serum osm 300, urine osm 280. Hyperglycemia present with steroids, but corrected Na still low at 126. TSH WNL. Likely hypovolemic hyponatremia, diuretic induced and from renal losses. Improved. Euvolemic.     - Recommend fluid restriction of 1 to 1.5 L   - No Lasix at this time  - No need for tolvaptan at this time  - Still hyperglycemic, Recommend improved blood glucose control, goal 140-180 inpatient.   - Monitor UOP without lasix for now. Should be around 30 cc/hour.   - No salt tablets or hypertonic saline at this time.  - Goal increase sodium 4-6 mM/24h

## 2022-02-26 NOTE — ASSESSMENT & PLAN NOTE
82 yo F with acute onset hyponatremia and VIRGILIO on CKD IV. Hyponatremia of 124 on 2/26, trending down from 136 > 132 >129. Asymptomatic. Patient reports drinking about three 16 oz bottles of water daily at home, and has been drinking 3-4 cups of water per day while in the hospital. Likely not primary polydipsia. Urine Na elevated at 50, likely from VIRGILIO and diuretics. Serum osm 300, urine osm 280. Hyperglycemia present with steroids, but corrected Na still low at 126. TSH WNL. Likely hypovolemic hyponatremia, diuretic induced and from renal losses.      - Recommend fluid restriction of 1 to 1.5 L   - Will consider Lasix drip if Na continues to trend down.   - Hold Lasix bolus doses.   - Recommend improved blood glucose control, goal 140-180 inpatient.   - Monitor UOP without lasix for now. Should be around 30 cc/hour.   - No salt tablets or hypertonic saline at this time.  - Goal increase sodium 4-6 mM/24h

## 2022-02-26 NOTE — HPI
Myesha Quinones is a 81 y.o. female with a PMHx of CAD, ischemic cardiomyopathy with LBBB s/p biventricular ICD, CHF (EF 50%, global hypokinesis, grade II diastolic dysfunction, evidence of pulmonary HTN), CKD4, IDDM (A1c 5.9), breast cancer, HTN, asthma, COPD, and HLD who presents to the ED with complaints of shortness of breath and green sputum production likely due to COPD/CHF exacerbation. Being treated with albuterol nebs, CTX/doxycycline, mucinex, prednisone for 5 days, tiotropium, fluticasone/vilanterol by hospital medicine. Her blood glucose has been more elevated since starting the steroids.     Her SOB is worse with exertion. She has received Lasix in the ED and 20 mg BID until 2/22. Since then her Cr trended up from Baseline 2.1 to 3.2. Concern for overdiureses, so small fluid bolus 500 cc was given on 2/24. She is already established with Dr. Tanner at nephrology clinic, diagnosed with stable CKD stage IV 2/2 diabetic nephropathy with UPCr of 3.13 to 5.33 previously. UPCr now 6.78. Urine Na 50, urine Cr 46, urine urea 227, urine K 30. Retroperitoneal US shows medical renal disease, simple to minimally complex cysts.     Nephrology has consulted for hyponatremia, VIRGILIO on CKD IV, acidosis, and proteinuria.

## 2022-02-26 NOTE — PROGRESS NOTES
[facility] - Oncology ([facility])  [facility] Medicine  Progress Note    Patient Name: Myesha Quinones  MRN: [id number]  Patient Class: IP- Inpatient   Admission Date: 2/21/2022  Length of Stay: 4 days  Attending Physician: Estefanía Goncalves MD  Primary Care Provider: Catrachita Rothman MD        Subjective:     Principal Problem:Acute renal failure superimposed on stage 4 chronic kidney disease        HPI:  Myesha Quinones is a 81 y.o. female with a PMHx of CAD, ischemic cardiomyopathy with LBBB s/p biventricular ICD, CHF, CKD4, IDDM, breast cancer, HTN, asthma, COPD, and HLD who presents to the ED with complaints of shortness of breath. The patient reports shortness of breath beginning about 12 days ago. She reports her shortness of breath is worse with exertion. She was seen in cardiology clinic and started on lasix 20mg daily and reports weight loss of 5lbs and significant improvement of BLE edema. However, her dyspnea has been worsening. She reports a chronic cough that has become productive over the last week, noting green sputum. Endorses associated fatigue and generalized weakness. Denies orthopnea, PND, chest pain, palpitations, fever, chills, nausea, vomiting, diarrhea, or dysuria.    In the ED, patient found to be hypoxic requiring 2L O2 via nasal cannula. Patient also hypertensive and mildly tachycardic, afebrile. CBC unremarkable. CMP with baseline CKD. , improved from previous. Troponin WNL. Lactate and procal in process. UA negative for infection. CXR Stable right pleural fluid. Stable bilateral reticular opacities suggestive of pulmonary edema or infection. EKG with ventricularly paced rhythm, rate 84. The patient received oral hydralazine, duo neb treatment, and IV lasix in the ED.      Overview/Hospital Course:  Mrs. Quinones was admitted to observation for CHF and COPD exacerbation. Afebrile without leukocytosis. Started on Lasix IV 20 mg BID and scheduled duonebs, prednisone, and  antibiotics. Diuresed well, net neg 2L, however her Cr continues to increase. Suspect over diuresis. Will check urine studies, RP sono unremarkable. Give small IVF bolus. Nephrology consulted for further assistance, agree with over diuresis. Continue fluid restriction and T2DM control. BMP BID      Interval History: Cr on labs is stable this AM, appreciate nephro recs. Increasing insulin for BG control. Pt seen at bedside, discussed plan of care. She reports no issues with urination. Still complaining of SOB with exertion. Denies leg swelling. Will call her son Macho [phone] to update    Review of Systems   Constitutional:  Positive for fatigue. Negative for fever.   HENT:  Positive for congestion.    Respiratory:  Positive for cough, shortness of breath and wheezing.    Cardiovascular:  Negative for leg swelling.   Gastrointestinal:  Negative for abdominal pain.   Genitourinary:  Negative for decreased urine volume, difficulty urinating, dysuria and frequency.   Musculoskeletal:  Negative for arthralgias.   Neurological:  Negative for dizziness and syncope.   Objective:     Vital Signs (Most Recent):  Temp: 96 °F (35.6 °C) (02/26/22 0745)  Pulse: 72 (02/26/22 0908)  Resp: 16 (02/26/22 0908)  BP: (!) 156/70 (02/26/22 0745)  SpO2: (!) 94 % (02/26/22 0908)   Vital Signs (24h Range):  Temp:  [96 °F (35.6 °C)-98.1 °F (36.7 °C)] 96 °F (35.6 °C)  Pulse:  [61-87] 72  Resp:  [16-21] 16  SpO2:  [91 %-97 %] 94 %  BP: (121-179)/(60-77) 156/70     Weight: 64.5 kg (142 lb 3.2 oz)  Body mass index is 25.19 kg/m².    Intake/Output Summary (Last 24 hours) at 2/26/2022 1303  Last data filed at 2/26/2022 0553  Gross per 24 hour   Intake 822 ml   Output 1550 ml   Net -728 ml      Physical Exam  Vitals and nursing note reviewed.   Constitutional:       General: She is not in acute distress.     Appearance: Normal appearance. She is not ill-appearing.   HENT:      Mouth/Throat:      Mouth: Mucous membranes are dry.   Eyes:       Extraocular Movements: Extraocular movements intact.   Cardiovascular:      Rate and Rhythm: Normal rate and regular rhythm.   Pulmonary:      Effort: Pulmonary effort is normal. No respiratory distress.      Breath sounds: Wheezing (mild diffuse) present.      Comments: Speaking in full sentences ORA; breath sounds coarse  Abdominal:      General: Abdomen is flat.      Palpations: Abdomen is soft.   Musculoskeletal:      Right lower leg: No edema.      Left lower leg: No edema.      Comments: Chronic RUE swelling from prior mastectomy    Skin:     General: Skin is warm and dry.   Neurological:      General: No focal deficit present.      Mental Status: She is alert.   Psychiatric:         Mood and Affect: Mood normal.         Behavior: Behavior normal.       Significant Labs: All pertinent labs within the past 24 hours have been reviewed.    Significant Imaging: I have reviewed all pertinent imaging results/findings within the past 24 hours.      Assessment/Plan:      * Acute renal failure superimposed on stage 4 chronic kidney disease  Hyponatremia  - Cr 2.3>>3.5>3.2, baseline ~2  - suspect prerenal in setting of diuresis  - give 500cc IVF, suspect hypovolemia hyponatremia (corrected Na 129)  - holding home lasix/ losartan  - UA, urine studies, osmol, RP sono completed: consistent with hyponatremia hypovolemia   Nephrology consulted for further assistance for persistent hyponatremia and VIRGILIO:   - Recommend fluid restriction of 1 to 1.5 L   - Will consider Lasix drip if Na continues to trend down and inadequate UOP for preferential water over Na diuresis.   - Will consider tolvaptan if Na decreases to low 120s without improvement in Cr, poor UOP and if BG better controlled  - Hold Lasix bolus doses.       - BID BMP  - avoid nephrotoxic agents  - monitor with daily labs  - strict I/Os    Chronic obstructive pulmonary disease with acute exacerbation  Worsening SOB with productive cough with green sputum  - Afebrile  without leukocytosis.   -Continue supplemental oxygen as needed, weaned to room air  - CTX/Doxy (given report of thick green sputum) and Predinsone x 5d- completed 2/26  - scheduled duonebs [id number]  - started mucinex-DM BID, tessalon perles TID PRN cough  -Continue daily inhalers  - monitor for hyperglycemia while on steroids    Acute on chronic combined systolic and diastolic heart failure  Nonischemic cardiomyopathy  Resolving  Patient is identified as having Combined Systolic and Diastolic heart failure that is Acute on Chronic. CHF is currently uncontrolled due to volume overload due to: Continued edema of extremities and JVD, Rales/crackles on pulmonary exam and Pulmonary edema/pleural effusion on CXR. Latest ECHO performed and demonstrates- Results for orders placed during the hospital encounter of 03/23/21    Echo Color Flow Doppler? Yes    Interpretation Summary  · The left ventricle is normal in size with eccentric hypertrophy and low normal systolic function. The estimated ejection fraction is 50%  · There are segmental left ventricular wall motion abnormalities.  · Normal left ventricular diastolic function.  · Normal right ventricular size with normal right ventricular systolic function.  · Mild tricuspid regurgitation.  · The estimated PA systolic pressure is 44 mmHg.  · There is pulmonary hypertension.  · Normal central venous pressure (3 mmHg).  . Continue Beta Blocker ACE/ARB Furosemide Nitrate/Vasodilator and monitor clinical status closely. Monitor on telemetry. Patient is on CHF pathway.  Monitor strict Is&Os and daily weights.  Place on fluid restriction of 1.5 L. Continue to stress to patient importance of self efficacy and  on diet for CHF. Last BNP reviewed- and noted below   Recent Labs   Lab 02/21/22  1935   *   -ECHO ordered  - home lasix 20 mg daily  - hospital lasix 20mg IV BID--> held in setting of VIRGILIO  Give small IVF back    Type 2 diabetes mellitus with hyperglycemia,  with long-term current use of insulin  Patient's FSGs are controlled on current hypoglycemics.   Last A1c reviewed-   Lab Results   Component Value Date    HGBA1C 5.9 (H) 02/22/2022     Most recent fingerstick glucose reviewed-   Recent Labs   Lab 02/23/22 2053 02/24/22  0028 02/24/22  0804 02/24/22  1154   POCTGLUCOSE 408* 389* 285* 307*     Current correctional scale  Low  Maintain anti-hyperglycemic dose as follows-   Antihyperglycemics (From admission, onward)            Start     Stop Route Frequency Ordered    02/24/22 0900  insulin detemir U-100 pen 22 Units         -- SubQ Daily 02/24/22 0826    02/23/22 1130  insulin aspart U-100 pen 5 Units         -- SubQ 3 times daily with meals 02/23/22 0820 02/23/22 1018  insulin aspart U-100 pen 0-5 Units         -- SubQ Before meals & nightly PRN 02/23/22 0919      Accuchecks AC/HS  Diabetic/cardiac diet  Monitor closely while on prednisone>> increasing daily but cautiously in setting of VIRGILIO    Hyperlipidemia associated with type 2 diabetes mellitus   Patient is chronically on statin.will continue for now. Monitor clinically. Last LDL was   Lab Results   Component Value Date    LDLCALC 43.8 (L) 02/08/2022       Iron deficiency anemia  Patient's anemia is currently controlled.   Current CBC reviewed-   Lab Results   Component Value Date    HGB 10.9 (L) 02/21/2022    HCT 35.4 (L) 02/21/2022     Monitor serial CBC and transfuse if patient becomes hemodynamically unstable, symptomatic or H/H drops below 7/21.     Hypertension associated with diabetes  -Initially hypertensive, improved with home medications.  -Continue coreg 25mg BID, cardizem 240mg daily, hydralazine 100mg TID, ibersartan 300mg qhs.  - cardiac diet    Obstructive sleep apnea  -Continue CPAP qhs    CKD (chronic kidney disease) stage 4, GFR 15-29 ml/min  - see VIRGILIO    Acute respiratory failure with hypoxia and hypercarbia  Patient with acute hypoxemic respiratory failure likely secondary to CHF  exacerbation + COPD exacerbation. Patient reporting worsening cough for the last week with green sputum. CXR with stable right pleural fluid. Stable bilateral reticular opacities suggestive of pulmonary edema or infection.    -Procalcitonin and lactic WNL. Do not suspect PNA  -CTX/ doxy switched to azithromycin for COPD exacerbation + prednisone, scheduled breathing treatments  - diuresis held in setting of VIRGILIO  - wean oxygen as tolerated, currently on RA    Biventricular ICD (implantable cardioverter-defibrillator) in place  Seen on CXR  - stable    Asthma, chronic  -Continue montelukast  -PRN duo nebs      VTE Risk Mitigation (From admission, onward)         Ordered     heparin (porcine) injection 5,000 Units  Every 8 hours         02/22/22 0335     IP VTE HIGH RISK PATIENT  Once         02/22/22 0335     Place sequential compression device  Until discontinued         02/22/22 0335                Discharge Planning   ALIX: 2/27/2022     Code Status: Full Code   Is the patient medically ready for discharge?: No    Reason for patient still in hospital (select all that apply): Patient trending condition, Laboratory test, Treatment, Consult recommendations and Pending disposition  Discharge Plan A: Home with family, Home Health                  Jolynn Kemp PA-C  Department of Hospital Medicine   [facility] - Oncology ([facility])

## 2022-02-26 NOTE — ASSESSMENT & PLAN NOTE
Hyponatremia  - Cr 2.3>>3.5>3.2, baseline ~2  - suspect prerenal in setting of diuresis  - give 500cc IVF, suspect hypovolemia hyponatremia (corrected Na 129)  - holding home lasix/ losartan  - UA, urine studies, osmol, RP sono completed: consistent with hyponatremia hypovolemia   Nephrology consulted for further assistance for persistent hyponatremia and VIRGILIO:   - Recommend fluid restriction of 1 to 1.5 L   - Will consider Lasix drip if Na continues to trend down and inadequate UOP for preferential water over Na diuresis.   - Will consider tolvaptan if Na decreases to low 120s without improvement in Cr, poor UOP and if BG better controlled  - Hold Lasix bolus doses.       - BID BMP  - avoid nephrotoxic agents  - monitor with daily labs  - strict I/Os

## 2022-02-26 NOTE — ASSESSMENT & PLAN NOTE
Proteinuria of 6.78, increased from previous UPCr of 5.33. Likely 2/2 to longstanding diabetic nephropathy. DM well controlled now compared to before.     - Consider follow up with rheum outpatient for previous positive ZURDO

## 2022-02-26 NOTE — SUBJECTIVE & OBJECTIVE
Interval History: Cr on labs is stable this AM, appreciate nephro recs. Increasing insulin for BG control. Pt seen at bedside, discussed plan of care. She reports no issues with urination. Still complaining of SOB with exertion. Denies leg swelling. Will call her son Macho [phone] to update    Review of Systems   Constitutional:  Positive for fatigue. Negative for fever.   HENT:  Positive for congestion.    Respiratory:  Positive for cough, shortness of breath and wheezing.    Cardiovascular:  Negative for leg swelling.   Gastrointestinal:  Negative for abdominal pain.   Genitourinary:  Negative for decreased urine volume, difficulty urinating, dysuria and frequency.   Musculoskeletal:  Negative for arthralgias.   Neurological:  Negative for dizziness and syncope.   Objective:     Vital Signs (Most Recent):  Temp: 96 °F (35.6 °C) (02/26/22 0745)  Pulse: 72 (02/26/22 0908)  Resp: 16 (02/26/22 0908)  BP: (!) 156/70 (02/26/22 0745)  SpO2: (!) 94 % (02/26/22 0908)   Vital Signs (24h Range):  Temp:  [96 °F (35.6 °C)-98.1 °F (36.7 °C)] 96 °F (35.6 °C)  Pulse:  [61-87] 72  Resp:  [16-21] 16  SpO2:  [91 %-97 %] 94 %  BP: (121-179)/(60-77) 156/70     Weight: 64.5 kg (142 lb 3.2 oz)  Body mass index is 25.19 kg/m².    Intake/Output Summary (Last 24 hours) at 2/26/2022 1303  Last data filed at 2/26/2022 0553  Gross per 24 hour   Intake 822 ml   Output 1550 ml   Net -728 ml      Physical Exam  Vitals and nursing note reviewed.   Constitutional:       General: She is not in acute distress.     Appearance: Normal appearance. She is not ill-appearing.   HENT:      Mouth/Throat:      Mouth: Mucous membranes are dry.   Eyes:      Extraocular Movements: Extraocular movements intact.   Cardiovascular:      Rate and Rhythm: Normal rate and regular rhythm.   Pulmonary:      Effort: Pulmonary effort is normal. No respiratory distress.      Breath sounds: Wheezing (mild diffuse) present.      Comments: Speaking in full sentences ORA;  breath sounds coarse  Abdominal:      General: Abdomen is flat.      Palpations: Abdomen is soft.   Musculoskeletal:      Right lower leg: No edema.      Left lower leg: No edema.      Comments: Chronic RUE swelling from prior mastectomy    Skin:     General: Skin is warm and dry.   Neurological:      General: No focal deficit present.      Mental Status: She is alert.   Psychiatric:         Mood and Affect: Mood normal.         Behavior: Behavior normal.       Significant Labs: All pertinent labs within the past 24 hours have been reviewed.    Significant Imaging: I have reviewed all pertinent imaging results/findings within the past 24 hours.

## 2022-02-26 NOTE — SUBJECTIVE & OBJECTIVE
Past Medical History:   Diagnosis Date    Acute hypoxemic respiratory failure 12/19/2019    Acute right-sided thoracic back pain 12/9/2019    Allergy     Asthma     Basal cell carcinoma     left forehead    Basal cell carcinoma     left nose    Basal cell carcinoma 05/20/2015    right nose    Basal cell carcinoma 12/22/2015    left lower post neck    Basal cell carcinoma 12/03/2019    left ant scalp     Breast cancer     CAD (coronary artery disease)     Cardiomyopathy     Cardiomyopathy, ischemic     Cataract     CHF (congestive heart failure)     Chronic kidney disease, stage 3, mod decreased GFR 2/14/2017    Colon polyp 2011    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, with long-term current use of insulin 2/22/2018    COPD (chronic obstructive pulmonary disease)     COPD exacerbation 4/8/2018    Current mild episode of major depressive disorder without prior episode 1/25/2022    Defibrillator discharge     Diabetes mellitus     Diabetes mellitus type II     Diabetes with neurologic complications     Goiter     MNG    HX: breast cancer     Hyperlipidemia     Hypertension     Iron deficiency anemia 5/16/2017    Left kidney mass     Meningioma     Microalbuminuria due to type 2 diabetes mellitus 1/26/2022    Osteoporosis, postmenopausal     Pneumonia 12/8/2019    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    Pseudomonas pneumonia     Skin cancer     s/p excision    Sleep apnea     CPAP    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/2015    mid forehead    Unspecified vitamin D deficiency     Ventricular tachycardia     Vitamin B12 deficiency     Vitamin D deficiency disease        Past Surgical History:   Procedure Laterality Date    BASAL CELL CARCINOMA EXCISION      posterior neck and nose    BREAST BIOPSY      BREAST CYST EXCISION Left     BREAST SURGERY      CARDIAC DEFIBRILLATOR PLACEMENT      x 2    CATARACT EXTRACTION W/  INTRAOCULAR LENS IMPLANT  Bilateral     CHOLECYSTECTOMY      COLONOSCOPY N/A 11/5/2019    Procedure: COLONOSCOPY;  Surgeon: Boaz Botello MD;  Location: [facility] ENDO (4TH FLR);  Service: Endoscopy;  Laterality: N/A;  AICD - [employer]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    FRACTURE SURGERY      left elbow and wrist as a child    HYSTERECTOMY      MASTECTOMY Right     REPLACEMENT OF IMPLANTABLE CARDIOVERTER-DEFIBRILLATOR (ICD) GENERATOR N/A 12/17/2018    Procedure: REPLACEMENT, ICD GENERATOR;  Surgeon: Jan Mckeon MD;  Location: [facility] EP LAB;  Service: Cardiology;  Laterality: N/A;  DONNA, CRT-D gen change, MDT, MAC, SK, 3 Prep    REVISION OF SKIN POCKET FOR CARDIOVERTER-DEFIBRILLATOR  12/17/2018    Procedure: Revision, Skin Pocket, For Cardioverter-Defibrillator;  Surgeon: Jan Mckeon MD;  Location: [facility] EP LAB;  Service: Cardiology;;    SQUAMOUS CELL CARCINOMA EXCISION      remved from forehead    TONSILLECTOMY         Review of patient's allergies indicates:   Allergen Reactions    Iodine and iodide containing products Hives    Nifedipine      weakness     Current Facility-Administered Medications   Medication Frequency    acetaminophen tablet 650 mg Q6H PRN    albuterol-ipratropium 2.5 mg-0.5 mg/3 mL nebulizer solution 3 mL Q4H PRN    albuterol-ipratropium 2.5 mg-0.5 mg/3 mL nebulizer solution 3 mL Q6H WAKE    ascorbic acid (vitamin C) tablet 250 mg Daily    benzonatate capsule 100 mg TID PRN    carvediloL tablet 25 mg BID    cefTRIAXone (ROCEPHIN) 1 g/50 mL D5W IVPB Q24H    cetirizine tablet 10 mg Daily    cloNIDine 0.1 mg/24 hr td ptwk 1 patch Every Tues    dextromethorphan-guaiFENesin  mg per 12 hr tablet 1 tablet BID    dextrose 10% bolus 125 mL PRN    dextrose 10% bolus 250 mL PRN    diltiaZEM 24 hr capsule 240 mg Daily    doxycycline tablet 100 mg Q12H    fluticasone furoate-vilanteroL 100-25 mcg/dose diskus inhaler 1 puff Daily    fluticasone propionate 50 mcg/actuation nasal spray 100 mcg Daily    glucagon  (human recombinant) injection 1 mg PRN    glucose chewable tablet 16 g PRN    glucose chewable tablet 24 g PRN    heparin (porcine) injection 5,000 Units Q8H    hydrALAZINE tablet 100 mg TID    insulin aspart U-100 pen 0-5 Units QID (AC + HS) PRN    insulin aspart U-100 pen 3 Units TIDWM    insulin detemir U-100 pen 18 Units Daily    montelukast tablet 10 mg QHS    nitroGLYCERIN SL tablet 0.4 mg Q5 Min PRN    pravastatin tablet 40 mg Daily    predniSONE tablet 40 mg Daily    sodium chloride 0.9% flush 10 mL PRN    tiotropium bromide 2.5 mcg/actuation inhaler 2 puff Daily    vitamin D 1000 units tablet 2,000 Units Daily     Family History       Problem Relation (Age of Onset)    Asthma Mother    Cancer Brother, Son, Daughter    Diabetes Father, Sister, Brother, Brother, Brother, Brother, Son, Daughter, Son    Heart disease Father, Sister, Brother, Brother, Brother    Hypertension Brother, Brother, Mother    Melanoma Daughter    No Known Problems Maternal Grandmother, Maternal Grandfather, Paternal Grandmother, Paternal Grandfather    Obesity Daughter    Stroke Mother          Tobacco Use    Smoking status: Never Smoker    Smokeless tobacco: Never Used   Substance and Sexual Activity    Alcohol use: No     Alcohol/week: 0.0 standard drinks    Drug use: No    Sexual activity: Yes     Partners: Male     Review of Systems   Constitutional:  Positive for fatigue. Negative for chills and fever.   HENT:  Negative for congestion and rhinorrhea.    Respiratory:  Positive for cough, shortness of breath and wheezing. Negative for chest tightness.    Cardiovascular:  Negative for chest pain, palpitations and leg swelling.   Gastrointestinal:  Negative for abdominal distention and abdominal pain.   Endocrine: Negative for polydipsia.   Genitourinary:  Negative for decreased urine volume, difficulty urinating, dysuria, flank pain and frequency.   Musculoskeletal:  Negative for arthralgias and back pain.   Neurological:  Negative  for dizziness, numbness and headaches.   Psychiatric/Behavioral:  Negative for agitation and behavioral problems.    Objective:     Vital Signs (Most Recent):  Temp: 98.1 °F (36.7 °C) (02/26/22 0426)  Pulse: 71 (02/26/22 0426)  Resp: 20 (02/26/22 0426)  BP: 138/63 (02/26/22 0426)  SpO2: 95 % (02/26/22 0426)  O2 Device (Oxygen Therapy): CPAP (02/26/22 0426) Vital Signs (24h Range):  Temp:  [97.7 °F (36.5 °C)-98.1 °F (36.7 °C)] 98.1 °F (36.7 °C)  Pulse:  [61-87] 71  Resp:  [16-21] 20  SpO2:  [91 %-98 %] 95 %  BP: (121-179)/(60-77) 138/63     Weight: 64.4 kg (141 lb 15.6 oz) (02/25/22 0534)  Body mass index is 25.15 kg/m².  Body surface area is 1.69 meters squared.    I/O last 3 completed shifts:  In: 1302 [P.O.:1302]  Out: 1350 [Urine:1350]    Physical Exam  Vitals and nursing note reviewed.   Constitutional:       General: She is not in acute distress.     Appearance: Normal appearance. She is not ill-appearing.   HENT:      Head: Normocephalic and atraumatic.      Nose: Nose normal.   Eyes:      Extraocular Movements: Extraocular movements intact.   Cardiovascular:      Rate and Rhythm: Normal rate and regular rhythm.      Pulses: Normal pulses.      Heart sounds: Normal heart sounds.   Pulmonary:      Effort: Pulmonary effort is normal. No respiratory distress.      Breath sounds: Wheezing (mild) present. No rales.      Comments: Speaking in full sentences ORA   Abdominal:      General: Abdomen is flat. There is no distension.      Palpations: Abdomen is soft.      Tenderness: There is no abdominal tenderness. There is no guarding.   Musculoskeletal:         General: Normal range of motion.      Cervical back: Normal range of motion.      Right lower leg: Edema present.      Left lower leg: Edema present.      Comments: RLE > LLE, chronic   Skin:     General: Skin is warm and dry.      Capillary Refill: Capillary refill takes less than 2 seconds.      Coloration: Skin is not jaundiced.   Neurological:      General:  No focal deficit present.      Mental Status: She is alert and oriented to person, place, and time.   Psychiatric:         Mood and Affect: Mood normal.         Behavior: Behavior normal.       Significant Labs:  CBC:   Recent Labs   Lab 02/26/22 0335   WBC 9.08   RBC 3.31*   HGB 9.7*   HCT 30.3*      MCV 92   MCH 29.3   MCHC 32.0     CMP:   Recent Labs   Lab 02/25/22  0911 02/26/22 0335   GLU  --  284*   CALCIUM  --  8.5*   ALBUMIN 3.0*  --    PROT 6.8  --    NA  --  129*   K  --  4.8   CO2  --  16*   CL  --  96   BUN  --  86*   CREATININE  --  3.2*   ALKPHOS 100  --    ALT 19  --    AST 19  --    BILITOT 0.3  --      All labs within the past 24 hours have been reviewed.    Significant Imaging:  US: Reviewed

## 2022-02-26 NOTE — ASSESSMENT & PLAN NOTE
Worsening SOB with productive cough with green sputum  - Afebrile without leukocytosis.   -Continue supplemental oxygen as needed, weaned to room air  - CTX/Doxy (given report of thick green sputum) and Predinsone x 5d- completed 2/26  - scheduled duonebs [id number]  - started mucinex-DM BID, tessalon perles TID PRN cough  -Continue daily inhalers  - monitor for hyperglycemia while on steroids

## 2022-02-27 LAB
ANION GAP SERPL CALC-SCNC: 12 MMOL/L (ref 8–16)
ANION GAP SERPL CALC-SCNC: 15 MMOL/L (ref 8–16)
BUN SERPL-MCNC: 81 MG/DL (ref 8–23)
BUN SERPL-MCNC: 84 MG/DL (ref 8–23)
CALCIUM SERPL-MCNC: 8.5 MG/DL (ref 8.7–10.5)
CALCIUM SERPL-MCNC: 9.2 MG/DL (ref 8.7–10.5)
CHLORIDE SERPL-SCNC: 100 MMOL/L (ref 95–110)
CHLORIDE SERPL-SCNC: 102 MMOL/L (ref 95–110)
CO2 SERPL-SCNC: 19 MMOL/L (ref 23–29)
CO2 SERPL-SCNC: 19 MMOL/L (ref 23–29)
CREAT SERPL-MCNC: 2.6 MG/DL (ref 0.5–1.4)
CREAT SERPL-MCNC: 2.7 MG/DL (ref 0.5–1.4)
EST. GFR  (AFRICAN AMERICAN): 18.2 ML/MIN/1.73 M^2
EST. GFR  (AFRICAN AMERICAN): 19.1 ML/MIN/1.73 M^2
EST. GFR  (NON AFRICAN AMERICAN): 15.8 ML/MIN/1.73 M^2
EST. GFR  (NON AFRICAN AMERICAN): 16.6 ML/MIN/1.73 M^2
GLUCOSE SERPL-MCNC: 115 MG/DL (ref 70–110)
GLUCOSE SERPL-MCNC: 129 MG/DL (ref 70–110)
POCT GLUCOSE: 113 MG/DL (ref 70–110)
POCT GLUCOSE: 143 MG/DL (ref 70–110)
POCT GLUCOSE: 168 MG/DL (ref 70–110)
POTASSIUM SERPL-SCNC: 4.3 MMOL/L (ref 3.5–5.1)
POTASSIUM SERPL-SCNC: 4.3 MMOL/L (ref 3.5–5.1)
SODIUM SERPL-SCNC: 133 MMOL/L (ref 136–145)
SODIUM SERPL-SCNC: 134 MMOL/L (ref 136–145)

## 2022-02-27 PROCEDURE — 94667 MNPJ CHEST WALL 1ST: CPT

## 2022-02-27 PROCEDURE — 25000003 PHARM REV CODE 250: Performed by: PHYSICIAN ASSISTANT

## 2022-02-27 PROCEDURE — 99233 PR SUBSEQUENT HOSPITAL CARE,LEVL III: ICD-10-PCS | Mod: ,,, | Performed by: PHYSICIAN ASSISTANT

## 2022-02-27 PROCEDURE — 27000221 HC OXYGEN, UP TO 24 HOURS

## 2022-02-27 PROCEDURE — 63600175 PHARM REV CODE 636 W HCPCS: Performed by: PHYSICIAN ASSISTANT

## 2022-02-27 PROCEDURE — 20600001 HC STEP DOWN PRIVATE ROOM

## 2022-02-27 PROCEDURE — 25000003 PHARM REV CODE 250: Performed by: NURSE PRACTITIONER

## 2022-02-27 PROCEDURE — 36415 COLL VENOUS BLD VENIPUNCTURE: CPT | Performed by: PHYSICIAN ASSISTANT

## 2022-02-27 PROCEDURE — 99900035 HC TECH TIME PER 15 MIN (STAT)

## 2022-02-27 PROCEDURE — 94640 AIRWAY INHALATION TREATMENT: CPT

## 2022-02-27 PROCEDURE — 80048 BASIC METABOLIC PNL TOTAL CA: CPT | Mod: 91 | Performed by: PHYSICIAN ASSISTANT

## 2022-02-27 PROCEDURE — 63600175 PHARM REV CODE 636 W HCPCS: Performed by: NURSE PRACTITIONER

## 2022-02-27 PROCEDURE — 99233 SBSQ HOSP IP/OBS HIGH 50: CPT | Mod: ,,, | Performed by: PHYSICIAN ASSISTANT

## 2022-02-27 PROCEDURE — 94761 N-INVAS EAR/PLS OXIMETRY MLT: CPT

## 2022-02-27 PROCEDURE — 25000242 PHARM REV CODE 250 ALT 637 W/ HCPCS: Performed by: PHYSICIAN ASSISTANT

## 2022-02-27 RX ORDER — ISOSORBIDE MONONITRATE 30 MG/1
30 TABLET, EXTENDED RELEASE ORAL ONCE
Status: COMPLETED | OUTPATIENT
Start: 2022-02-27 | End: 2022-02-27

## 2022-02-27 RX ORDER — ISOSORBIDE MONONITRATE 30 MG/1
30 TABLET, EXTENDED RELEASE ORAL DAILY
Status: DISCONTINUED | OUTPATIENT
Start: 2022-02-27 | End: 2022-03-01 | Stop reason: HOSPADM

## 2022-02-27 RX ADMIN — TIOTROPIUM BROMIDE INHALATION SPRAY 2 PUFF: 3.12 SPRAY, METERED RESPIRATORY (INHALATION) at 09:02

## 2022-02-27 RX ADMIN — CHOLECALCIFEROL TAB 25 MCG (1000 UNIT) 2000 UNITS: 25 TAB at 08:02

## 2022-02-27 RX ADMIN — DOXYCYCLINE HYCLATE 100 MG: 100 TABLET, COATED ORAL at 08:02

## 2022-02-27 RX ADMIN — ACETAMINOPHEN 650 MG: 325 TABLET ORAL at 12:02

## 2022-02-27 RX ADMIN — FLUTICASONE PROPIONATE 100 MCG: 50 SPRAY, METERED NASAL at 08:02

## 2022-02-27 RX ADMIN — MONTELUKAST 10 MG: 10 TABLET, FILM COATED ORAL at 07:02

## 2022-02-27 RX ADMIN — IPRATROPIUM BROMIDE AND ALBUTEROL SULFATE 3 ML: 2.5; .5 SOLUTION RESPIRATORY (INHALATION) at 03:02

## 2022-02-27 RX ADMIN — ISOSORBIDE MONONITRATE 30 MG: 30 TABLET, EXTENDED RELEASE ORAL at 10:02

## 2022-02-27 RX ADMIN — PRAVASTATIN SODIUM 40 MG: 40 TABLET ORAL at 09:02

## 2022-02-27 RX ADMIN — Medication 16 G: at 04:02

## 2022-02-27 RX ADMIN — IPRATROPIUM BROMIDE AND ALBUTEROL SULFATE 3 ML: 2.5; .5 SOLUTION RESPIRATORY (INHALATION) at 09:02

## 2022-02-27 RX ADMIN — CARVEDILOL 25 MG: 25 TABLET, FILM COATED ORAL at 07:02

## 2022-02-27 RX ADMIN — HEPARIN SODIUM 5000 UNITS: 5000 INJECTION INTRAVENOUS; SUBCUTANEOUS at 06:02

## 2022-02-27 RX ADMIN — HEPARIN SODIUM 5000 UNITS: 5000 INJECTION INTRAVENOUS; SUBCUTANEOUS at 02:02

## 2022-02-27 RX ADMIN — CEFTRIAXONE 1 G: 1 INJECTION, SOLUTION INTRAVENOUS at 11:02

## 2022-02-27 RX ADMIN — HEPARIN SODIUM 5000 UNITS: 5000 INJECTION INTRAVENOUS; SUBCUTANEOUS at 09:02

## 2022-02-27 RX ADMIN — DILTIAZEM HYDROCHLORIDE 240 MG: 120 CAPSULE, COATED, EXTENDED RELEASE ORAL at 08:02

## 2022-02-27 RX ADMIN — ISOSORBIDE MONONITRATE 30 MG: 30 TABLET, EXTENDED RELEASE ORAL at 08:02

## 2022-02-27 RX ADMIN — CARVEDILOL 25 MG: 25 TABLET, FILM COATED ORAL at 08:02

## 2022-02-27 RX ADMIN — INSULIN ASPART 10 UNITS: 100 INJECTION, SOLUTION INTRAVENOUS; SUBCUTANEOUS at 11:02

## 2022-02-27 RX ADMIN — HYDRALAZINE HYDROCHLORIDE 100 MG: 25 TABLET, FILM COATED ORAL at 02:02

## 2022-02-27 RX ADMIN — FLUTICASONE FUROATE AND VILANTEROL TRIFENATATE 1 PUFF: 100; 25 POWDER RESPIRATORY (INHALATION) at 08:02

## 2022-02-27 RX ADMIN — GUAIFENESIN AND DEXTROMETHORPHAN HYDROBROMIDE 1 TABLET: 600; 30 TABLET, EXTENDED RELEASE ORAL at 08:02

## 2022-02-27 RX ADMIN — INSULIN ASPART 10 UNITS: 100 INJECTION, SOLUTION INTRAVENOUS; SUBCUTANEOUS at 08:02

## 2022-02-27 RX ADMIN — CETIRIZINE HYDROCHLORIDE 10 MG: 5 TABLET ORAL at 08:02

## 2022-02-27 RX ADMIN — HYDRALAZINE HYDROCHLORIDE 100 MG: 25 TABLET, FILM COATED ORAL at 08:02

## 2022-02-27 RX ADMIN — DOXYCYCLINE HYCLATE 100 MG: 100 TABLET, COATED ORAL at 07:02

## 2022-02-27 RX ADMIN — Medication 250 MG: at 08:02

## 2022-02-27 RX ADMIN — HYDRALAZINE HYDROCHLORIDE 100 MG: 25 TABLET, FILM COATED ORAL at 07:02

## 2022-02-27 RX ADMIN — GUAIFENESIN AND DEXTROMETHORPHAN HYDROBROMIDE 1 TABLET: 600; 30 TABLET, EXTENDED RELEASE ORAL at 07:02

## 2022-02-27 NOTE — ASSESSMENT & PLAN NOTE
Patient with acute hypoxemic respiratory failure likely secondary to CHF exacerbation + COPD exacerbation. Patient reporting worsening cough for the last week with green sputum. CXR with stable right pleural fluid. Stable bilateral reticular opacities suggestive of pulmonary edema or infection.    -Procalcitonin and lactic WNL. Do not suspect PNA  -CTX/ doxy switched to azithromycin for COPD exacerbation + prednisone, scheduled breathing treatments  - diuresis held in setting of VIRGILIO  - wean oxygen as tolerated, currently on RA  CT chest: Obliteration of the right bronchus intermedius and bilateral lower lobes basal segments bronchi possibly by mucous, aspiration or pneumonia.  There is complete volume loss of the middle lobe and patchy subsegmental atelectasis or airspace disease of the basal segments of the bilateral lower lobes right more than left.  Consider aspiration or pneumonia.     Enlarged precarinal and sub carinal nodes could be reactive to the underlying inflammatory process, less likely a neoplastic process not excluded.     Right upper lobe pleural base nodule, For a solid nodule >8 mm, Fleischner Society 2017 guidelines recommend considering follow-up CT at 3 months.     Start CPT, fu with pulm

## 2022-02-27 NOTE — PROGRESS NOTES
[facility] - Oncology ([facility])  [facility] Medicine  Progress Note    Patient Name: Myesha Quinones  MRN: [id number]  Patient Class: IP- Inpatient   Admission Date: 2/21/2022  Length of Stay: 5 days  Attending Physician: Estefanía Goncalves MD  Primary Care Provider: Catrachita Rothman MD        Subjective:     Principal Problem:Acute renal failure superimposed on stage 4 chronic kidney disease        HPI:  Myesha Quinones is a 81 y.o. female with a PMHx of CAD, ischemic cardiomyopathy with LBBB s/p biventricular ICD, CHF, CKD4, IDDM, breast cancer, HTN, asthma, COPD, and HLD who presents to the ED with complaints of shortness of breath. The patient reports shortness of breath beginning about 12 days ago. She reports her shortness of breath is worse with exertion. She was seen in cardiology clinic and started on lasix 20mg daily and reports weight loss of 5lbs and significant improvement of BLE edema. However, her dyspnea has been worsening. She reports a chronic cough that has become productive over the last week, noting green sputum. Endorses associated fatigue and generalized weakness. Denies orthopnea, PND, chest pain, palpitations, fever, chills, nausea, vomiting, diarrhea, or dysuria.    In the ED, patient found to be hypoxic requiring 2L O2 via nasal cannula. Patient also hypertensive and mildly tachycardic, afebrile. CBC unremarkable. CMP with baseline CKD. , improved from previous. Troponin WNL. Lactate and procal in process. UA negative for infection. CXR Stable right pleural fluid. Stable bilateral reticular opacities suggestive of pulmonary edema or infection. EKG with ventricularly paced rhythm, rate 84. The patient received oral hydralazine, duo neb treatment, and IV lasix in the ED.      Overview/Hospital Course:  Mrs. Quinones was admitted to observation for CHF and COPD exacerbation. Afebrile without leukocytosis. Started on Lasix IV 20 mg BID and scheduled duonebs, prednisone, and  antibiotics. Diuresed well, net neg 2L, however her Cr continues to increase. Suspect over diuresis. Will check urine studies, RP sono unremarkable. Give small IVF bolus. Nephrology consulted for further assistance, agree with over diuresis. Continue fluid restriction and T2DM control. BMP BID, improving      Interval History: Cr improving today. BP high this AM, adding IMDUR. Patient states she had a ehadache when ehr pressure was high, but now resolved. She says she still feels like she needs to cough something up. Her CT chest suggests mucous might be obilerterating the R broncus and bilat lower lobes, RT to trial CPT  Discussed with son    Review of Systems   Constitutional:  Positive for fatigue. Negative for fever.   HENT:  Positive for congestion.    Respiratory:  Positive for cough, shortness of breath and wheezing.    Cardiovascular:  Negative for leg swelling.   Gastrointestinal:  Negative for abdominal pain.   Genitourinary:  Negative for decreased urine volume, difficulty urinating, dysuria and frequency.   Musculoskeletal:  Negative for arthralgias.   Neurological:  Positive for headaches. Negative for dizziness.   Objective:     Vital Signs (Most Recent):  Temp: 97.9 °F (36.6 °C) (02/27/22 1143)  Pulse: 67 (02/27/22 1143)  Resp: 17 (02/27/22 1143)  BP: (!) 169/68 (02/27/22 1143)  SpO2: (!) 92 % (02/27/22 1143)   Vital Signs (24h Range):  Temp:  [97.9 °F (36.6 °C)-98.2 °F (36.8 °C)] 97.9 °F (36.6 °C)  Pulse:  [60-86] 67  Resp:  [16-18] 17  SpO2:  [92 %-98 %] 92 %  BP: (117-190)/(56-82) 169/68     Weight: 64.5 kg (142 lb 3.2 oz)  Body mass index is 25.19 kg/m².    Intake/Output Summary (Last 24 hours) at 2/27/2022 1318  Last data filed at 2/27/2022 1300  Gross per 24 hour   Intake 500 ml   Output 2300 ml   Net -1800 ml      Physical Exam  Vitals and nursing note reviewed.   Constitutional:       General: She is not in acute distress.     Appearance: Normal appearance. She is not ill-appearing.   HENT:       Mouth/Throat:      Mouth: Mucous membranes are dry.   Cardiovascular:      Rate and Rhythm: Normal rate and regular rhythm.   Pulmonary:      Effort: Pulmonary effort is normal. No respiratory distress.      Breath sounds: Wheezing (mild diffuse) present.      Comments: Speaking in full sentences ORA; breath sounds coarse  Abdominal:      General: Abdomen is flat.   Musculoskeletal:      Right lower leg: No edema.      Left lower leg: No edema.      Comments: Chronic RUE swelling from prior mastectomy    Skin:     General: Skin is warm and dry.   Neurological:      General: No focal deficit present.      Mental Status: She is alert.   Psychiatric:         Mood and Affect: Mood normal.         Behavior: Behavior normal.       Significant Labs: All pertinent labs within the past 24 hours have been reviewed.    Significant Imaging: I have reviewed all pertinent imaging results/findings within the past 24 hours.      Assessment/Plan:      * Acute renal failure superimposed on stage 4 chronic kidney disease  Hyponatremia  - Cr 2.3>>3.5>3.2, baseline ~2    Finally downtrending 2/27, Cr 2.7  - suspect prerenal in setting of diuresis  - give 500cc IVF, suspect hypovolemia hyponatremia (corrected Na 129)  - holding home lasix/ losartan  - UA, urine studies, osmol, RP sono completed: consistent with hyponatremia hypovolemia   Nephrology consulted for further assistance for persistent hyponatremia and VIRGILIO:   - Recommend fluid restriction of 1 to 1.5 L   - Will consider Lasix drip if Na continues to trend down and inadequate UOP for preferential water over Na diuresis.   - Will consider tolvaptan if Na decreases to low 120s without improvement in Cr, poor UOP and if BG better controlled  - Hold Lasix bolus doses.       - BID BMP  - avoid nephrotoxic agents  - monitor with daily labs  - strict I/Os    Chronic obstructive pulmonary disease with acute exacerbation  Worsening SOB with productive cough with green sputum  -  Afebrile without leukocytosis.   -Continue supplemental oxygen as needed, weaned to room air  - CTX/Doxy (given report of thick green sputum) and Predinsone x 5d- completed 2/26  - scheduled duonebs [id number]  - started mucinex-DM BID, tessalon perles TID PRN cough  -Continue daily inhalers  - monitor for hyperglycemia while on steroids    Acute on chronic combined systolic and diastolic heart failure  Nonischemic cardiomyopathy  Resolving  Patient is identified as having Combined Systolic and Diastolic heart failure that is Acute on Chronic. CHF is currently uncontrolled due to volume overload due to: Continued edema of extremities and JVD, Rales/crackles on pulmonary exam and Pulmonary edema/pleural effusion on CXR. Latest ECHO performed and demonstrates- Results for orders placed during the hospital encounter of 03/23/21    Echo Color Flow Doppler? Yes    Interpretation Summary  · The left ventricle is normal in size with eccentric hypertrophy and low normal systolic function. The estimated ejection fraction is 50%  · There are segmental left ventricular wall motion abnormalities.  · Normal left ventricular diastolic function.  · Normal right ventricular size with normal right ventricular systolic function.  · Mild tricuspid regurgitation.  · The estimated PA systolic pressure is 44 mmHg.  · There is pulmonary hypertension.  · Normal central venous pressure (3 mmHg).  . Continue Beta Blocker ACE/ARB Furosemide Nitrate/Vasodilator and monitor clinical status closely. Monitor on telemetry. Patient is on CHF pathway.  Monitor strict Is&Os and daily weights.  Place on fluid restriction of 1.5 L. Continue to stress to patient importance of self efficacy and  on diet for CHF. Last BNP reviewed- and noted below   Recent Labs   Lab 02/21/22  1935   *   -ECHO ordered  - home lasix 20 mg daily  - hospital lasix 20mg IV BID--> held in setting of VIRGILIO  Give small IVF back    Type 2 diabetes mellitus with  hyperglycemia, with long-term current use of insulin  Patient's FSGs are controlled on current hypoglycemics.   Last A1c reviewed-   Lab Results   Component Value Date    HGBA1C 5.9 (H) 02/22/2022     Most recent fingerstick glucose reviewed-   Recent Labs   Lab 02/26/22  1653 02/26/22  2053 02/27/22  0727 02/27/22  1153   POCTGLUCOSE 302* 165* 113* 168*     Current correctional scale  Low  Maintain anti-hyperglycemic dose as follows-   Antihyperglycemics (From admission, onward)            Start     Stop Route Frequency Ordered    02/27/22 2100  insulin detemir U-100 pen 17 Units         -- SubQ 2 times daily 02/27/22 1311    02/26/22 1130  insulin aspart U-100 pen 10 Units         -- SubQ 3 times daily with meals 02/26/22 0826    02/23/22 1018  insulin aspart U-100 pen 0-5 Units         -- SubQ Before meals & nightly PRN 02/23/22 0919      Accuchecks AC/HS  Diabetic/cardiac diet,  Monitor closely while on prednisone>> increasing daily but cautiously in setting of VIRGILIO, now reducing since prednisone course is completed    Hyperlipidemia associated with type 2 diabetes mellitus   Patient is chronically on statin.will continue for now. Monitor clinically. Last LDL was   Lab Results   Component Value Date    LDLCALC 43.8 (L) 02/08/2022       Iron deficiency anemia  Patient's anemia is currently controlled.   Current CBC reviewed-   Lab Results   Component Value Date    HGB 10.9 (L) 02/21/2022    HCT 35.4 (L) 02/21/2022     Monitor serial CBC and transfuse if patient becomes hemodynamically unstable, symptomatic or H/H drops below 7/21.     Hypertension associated with diabetes  -Initially hypertensive, improved with home medications.  -Continue coreg 25mg BID, cardizem 240mg daily, hydralazine 100mg TID, ibersartan 300mg qhs.  - cardiac diet    Obstructive sleep apnea  -Continue CPAP qhs    CKD (chronic kidney disease) stage 4, GFR 15-29 ml/min  - see VIRGILIO    Acute respiratory failure with hypoxia and  hypercarbia  Patient with acute hypoxemic respiratory failure likely secondary to CHF exacerbation + COPD exacerbation. Patient reporting worsening cough for the last week with green sputum. CXR with stable right pleural fluid. Stable bilateral reticular opacities suggestive of pulmonary edema or infection.    -Procalcitonin and lactic WNL. Do not suspect PNA  -CTX/ doxy switched to azithromycin for COPD exacerbation + prednisone, scheduled breathing treatments  - diuresis held in setting of VIRGILIO  - wean oxygen as tolerated, currently on RA  CT chest: Obliteration of the right bronchus intermedius and bilateral lower lobes basal segments bronchi possibly by mucous, aspiration or pneumonia.  There is complete volume loss of the middle lobe and patchy subsegmental atelectasis or airspace disease of the basal segments of the bilateral lower lobes right more than left.  Consider aspiration or pneumonia.     Enlarged precarinal and sub carinal nodes could be reactive to the underlying inflammatory process, less likely a neoplastic process not excluded.     Right upper lobe pleural base nodule, For a solid nodule >8 mm, Fleischner Society 2017 guidelines recommend considering follow-up CT at 3 months.     Start CPT, fu with pulm    Biventricular ICD (implantable cardioverter-defibrillator) in place  Seen on CXR  - stable    Asthma, chronic  -Continue montelukast  -PRN duo nebs      VTE Risk Mitigation (From admission, onward)         Ordered     heparin (porcine) injection 5,000 Units  Every 8 hours         02/22/22 0335     IP VTE HIGH RISK PATIENT  Once         02/22/22 0335     Place sequential compression device  Until discontinued         02/22/22 0335                Discharge Planning   ALIX: 3/1/2022     Code Status: Full Code   Is the patient medically ready for discharge?: No    Reason for patient still in hospital (select all that apply): Patient trending condition, Laboratory test, Treatment and Consult  recommendations  Discharge Plan A: Home with family, Home Health                  Jolynn Kemp PA-C  Department of Hospital Medicine   [facility] - Oncology ([facility])

## 2022-02-27 NOTE — SUBJECTIVE & OBJECTIVE
Interval History: Cr improving today. BP high this AM, adding IMDUR. Patient states she had a ehadache when ehr pressure was high, but now resolved. She says she still feels like she needs to cough something up. Her CT chest suggests mucous might be obilerterating the R broncus and bilat lower lobes, RT to trial CPT  Discussed with son    Review of Systems   Constitutional:  Positive for fatigue. Negative for fever.   HENT:  Positive for congestion.    Respiratory:  Positive for cough, shortness of breath and wheezing.    Cardiovascular:  Negative for leg swelling.   Gastrointestinal:  Negative for abdominal pain.   Genitourinary:  Negative for decreased urine volume, difficulty urinating, dysuria and frequency.   Musculoskeletal:  Negative for arthralgias.   Neurological:  Positive for headaches. Negative for dizziness.   Objective:     Vital Signs (Most Recent):  Temp: 97.9 °F (36.6 °C) (02/27/22 1143)  Pulse: 67 (02/27/22 1143)  Resp: 17 (02/27/22 1143)  BP: (!) 169/68 (02/27/22 1143)  SpO2: (!) 92 % (02/27/22 1143)   Vital Signs (24h Range):  Temp:  [97.9 °F (36.6 °C)-98.2 °F (36.8 °C)] 97.9 °F (36.6 °C)  Pulse:  [60-86] 67  Resp:  [16-18] 17  SpO2:  [92 %-98 %] 92 %  BP: (117-190)/(56-82) 169/68     Weight: 64.5 kg (142 lb 3.2 oz)  Body mass index is 25.19 kg/m².    Intake/Output Summary (Last 24 hours) at 2/27/2022 1318  Last data filed at 2/27/2022 1300  Gross per 24 hour   Intake 500 ml   Output 2300 ml   Net -1800 ml      Physical Exam  Vitals and nursing note reviewed.   Constitutional:       General: She is not in acute distress.     Appearance: Normal appearance. She is not ill-appearing.   HENT:      Mouth/Throat:      Mouth: Mucous membranes are dry.   Cardiovascular:      Rate and Rhythm: Normal rate and regular rhythm.   Pulmonary:      Effort: Pulmonary effort is normal. No respiratory distress.      Breath sounds: Wheezing (mild diffuse) present.      Comments: Speaking in full sentences ORA;  breath sounds coarse  Abdominal:      General: Abdomen is flat.   Musculoskeletal:      Right lower leg: No edema.      Left lower leg: No edema.      Comments: Chronic RUE swelling from prior mastectomy    Skin:     General: Skin is warm and dry.   Neurological:      General: No focal deficit present.      Mental Status: She is alert.   Psychiatric:         Mood and Affect: Mood normal.         Behavior: Behavior normal.       Significant Labs: All pertinent labs within the past 24 hours have been reviewed.    Significant Imaging: I have reviewed all pertinent imaging results/findings within the past 24 hours.

## 2022-02-27 NOTE — ASSESSMENT & PLAN NOTE
Patient's FSGs are controlled on current hypoglycemics.   Last A1c reviewed-   Lab Results   Component Value Date    HGBA1C 5.9 (H) 02/22/2022     Most recent fingerstick glucose reviewed-   Recent Labs   Lab 02/26/22  1653 02/26/22 2053 02/27/22  0727 02/27/22  1153   POCTGLUCOSE 302* 165* 113* 168*     Current correctional scale  Low  Maintain anti-hyperglycemic dose as follows-   Antihyperglycemics (From admission, onward)            Start     Stop Route Frequency Ordered    02/27/22 2100  insulin detemir U-100 pen 17 Units         -- SubQ 2 times daily 02/27/22 1311    02/26/22 1130  insulin aspart U-100 pen 10 Units         -- SubQ 3 times daily with meals 02/26/22 0826    02/23/22 1018  insulin aspart U-100 pen 0-5 Units         -- SubQ Before meals & nightly PRN 02/23/22 0919      Accuchecks AC/HS  Diabetic/cardiac diet,  Monitor closely while on prednisone>> increasing daily but cautiously in setting of VIRGILIO, now reducing since prednisone course is completed

## 2022-02-27 NOTE — ASSESSMENT & PLAN NOTE
Hyponatremia  - Cr 2.3>>3.5>3.2, baseline ~2    Finally downtrending 2/27, Cr 2.7  - suspect prerenal in setting of diuresis  - give 500cc IVF, suspect hypovolemia hyponatremia (corrected Na 129)  - holding home lasix/ losartan  - UA, urine studies, osmol, RP sono completed: consistent with hyponatremia hypovolemia   Nephrology consulted for further assistance for persistent hyponatremia and VIRGILIO:   - Recommend fluid restriction of 1 to 1.5 L   - Will consider Lasix drip if Na continues to trend down and inadequate UOP for preferential water over Na diuresis.   - Will consider tolvaptan if Na decreases to low 120s without improvement in Cr, poor UOP and if BG better controlled  - Hold Lasix bolus doses.       - BID BMP  - avoid nephrotoxic agents  - monitor with daily labs  - strict I/Os

## 2022-02-27 NOTE — PLAN OF CARE
Patient up to bedside chair alert verbally responsive able to make needs known. Patient has been down for CT Scan today of the chest related to worsen xray. [facility] educated on calling for assistance if needed.

## 2022-02-28 PROBLEM — R33.9 URINARY RETENTION: Status: ACTIVE | Noted: 2022-02-28

## 2022-02-28 LAB
ANION GAP SERPL CALC-SCNC: 10 MMOL/L (ref 8–16)
ANION GAP SERPL CALC-SCNC: 14 MMOL/L (ref 8–16)
BACTERIA #/AREA URNS AUTO: NORMAL /HPF
BASOPHILS # BLD AUTO: 0.03 K/UL (ref 0–0.2)
BASOPHILS NFR BLD: 0.3 % (ref 0–1.9)
BILIRUB UR QL STRIP: NEGATIVE
BUN SERPL-MCNC: 72 MG/DL (ref 8–23)
BUN SERPL-MCNC: 76 MG/DL (ref 8–23)
CALCIUM SERPL-MCNC: 8.7 MG/DL (ref 8.7–10.5)
CALCIUM SERPL-MCNC: 8.9 MG/DL (ref 8.7–10.5)
CHLORIDE SERPL-SCNC: 100 MMOL/L (ref 95–110)
CHLORIDE SERPL-SCNC: 102 MMOL/L (ref 95–110)
CLARITY UR REFRACT.AUTO: CLEAR
CO2 SERPL-SCNC: 19 MMOL/L (ref 23–29)
CO2 SERPL-SCNC: 21 MMOL/L (ref 23–29)
COLOR UR AUTO: YELLOW
CREAT SERPL-MCNC: 2.3 MG/DL (ref 0.5–1.4)
CREAT SERPL-MCNC: 2.4 MG/DL (ref 0.5–1.4)
DIFFERENTIAL METHOD: ABNORMAL
EOSINOPHIL # BLD AUTO: 0.1 K/UL (ref 0–0.5)
EOSINOPHIL NFR BLD: 1.4 % (ref 0–8)
ERYTHROCYTE [DISTWIDTH] IN BLOOD BY AUTOMATED COUNT: 13.1 % (ref 11.5–14.5)
EST. GFR  (AFRICAN AMERICAN): 21 ML/MIN/1.73 M^2
EST. GFR  (AFRICAN AMERICAN): 22.1 ML/MIN/1.73 M^2
EST. GFR  (NON AFRICAN AMERICAN): 18.2 ML/MIN/1.73 M^2
EST. GFR  (NON AFRICAN AMERICAN): 19.2 ML/MIN/1.73 M^2
GLUCOSE SERPL-MCNC: 257 MG/DL (ref 70–110)
GLUCOSE SERPL-MCNC: 57 MG/DL (ref 70–110)
GLUCOSE UR QL STRIP: ABNORMAL
HCT VFR BLD AUTO: 27.4 % (ref 37–48.5)
HGB BLD-MCNC: 8.7 G/DL (ref 12–16)
HGB UR QL STRIP: NEGATIVE
HYALINE CASTS UR QL AUTO: 0 /LPF
IMM GRANULOCYTES # BLD AUTO: 0.23 K/UL (ref 0–0.04)
IMM GRANULOCYTES NFR BLD AUTO: 2.4 % (ref 0–0.5)
KETONES UR QL STRIP: NEGATIVE
LEUKOCYTE ESTERASE UR QL STRIP: NEGATIVE
LYMPHOCYTES # BLD AUTO: 1.3 K/UL (ref 1–4.8)
LYMPHOCYTES NFR BLD: 13 % (ref 18–48)
MAGNESIUM SERPL-MCNC: 1.7 MG/DL (ref 1.6–2.6)
MCH RBC QN AUTO: 29.4 PG (ref 27–31)
MCHC RBC AUTO-ENTMCNC: 31.8 G/DL (ref 32–36)
MCV RBC AUTO: 93 FL (ref 82–98)
MICROSCOPIC COMMENT: NORMAL
MONOCYTES # BLD AUTO: 1.1 K/UL (ref 0.3–1)
MONOCYTES NFR BLD: 10.9 % (ref 4–15)
NEUTROPHILS # BLD AUTO: 7 K/UL (ref 1.8–7.7)
NEUTROPHILS NFR BLD: 72 % (ref 38–73)
NITRITE UR QL STRIP: NEGATIVE
NRBC BLD-RTO: 0 /100 WBC
PH UR STRIP: 5 [PH] (ref 5–8)
PLATELET # BLD AUTO: 237 K/UL (ref 150–450)
PMV BLD AUTO: 10.7 FL (ref 9.2–12.9)
POCT GLUCOSE: 255 MG/DL (ref 70–110)
POCT GLUCOSE: 287 MG/DL (ref 70–110)
POCT GLUCOSE: 365 MG/DL (ref 70–110)
POCT GLUCOSE: 57 MG/DL (ref 70–110)
POCT GLUCOSE: 72 MG/DL (ref 70–110)
POCT GLUCOSE: 82 MG/DL (ref 70–110)
POTASSIUM SERPL-SCNC: 4 MMOL/L (ref 3.5–5.1)
POTASSIUM SERPL-SCNC: 4.3 MMOL/L (ref 3.5–5.1)
PROT UR QL STRIP: ABNORMAL
RBC # BLD AUTO: 2.96 M/UL (ref 4–5.4)
RBC #/AREA URNS AUTO: 2 /HPF (ref 0–4)
SODIUM SERPL-SCNC: 133 MMOL/L (ref 136–145)
SODIUM SERPL-SCNC: 133 MMOL/L (ref 136–145)
SP GR UR STRIP: 1.01 (ref 1–1.03)
URN SPEC COLLECT METH UR: ABNORMAL
WBC # BLD AUTO: 9.71 K/UL (ref 3.9–12.7)
WBC #/AREA URNS AUTO: 1 /HPF (ref 0–5)

## 2022-02-28 PROCEDURE — 51798 US URINE CAPACITY MEASURE: CPT

## 2022-02-28 PROCEDURE — 80048 BASIC METABOLIC PNL TOTAL CA: CPT | Mod: 91 | Performed by: PHYSICIAN ASSISTANT

## 2022-02-28 PROCEDURE — 94761 N-INVAS EAR/PLS OXIMETRY MLT: CPT

## 2022-02-28 PROCEDURE — 97116 GAIT TRAINING THERAPY: CPT

## 2022-02-28 PROCEDURE — 94640 AIRWAY INHALATION TREATMENT: CPT

## 2022-02-28 PROCEDURE — 83735 ASSAY OF MAGNESIUM: CPT | Performed by: NURSE PRACTITIONER

## 2022-02-28 PROCEDURE — 94660 CPAP INITIATION&MGMT: CPT

## 2022-02-28 PROCEDURE — 85025 COMPLETE CBC W/AUTO DIFF WBC: CPT | Performed by: NURSE PRACTITIONER

## 2022-02-28 PROCEDURE — 94760 N-INVAS EAR/PLS OXIMETRY 1: CPT

## 2022-02-28 PROCEDURE — 36415 COLL VENOUS BLD VENIPUNCTURE: CPT | Performed by: NURSE PRACTITIONER

## 2022-02-28 PROCEDURE — 25000003 PHARM REV CODE 250: Performed by: PHYSICIAN ASSISTANT

## 2022-02-28 PROCEDURE — 94668 MNPJ CHEST WALL SBSQ: CPT

## 2022-02-28 PROCEDURE — 25000242 PHARM REV CODE 250 ALT 637 W/ HCPCS: Performed by: PHYSICIAN ASSISTANT

## 2022-02-28 PROCEDURE — 27000221 HC OXYGEN, UP TO 24 HOURS

## 2022-02-28 PROCEDURE — 81001 URINALYSIS AUTO W/SCOPE: CPT | Performed by: PHYSICIAN ASSISTANT

## 2022-02-28 PROCEDURE — 25000003 PHARM REV CODE 250: Performed by: NURSE PRACTITIONER

## 2022-02-28 PROCEDURE — 51701 INSERT BLADDER CATHETER: CPT

## 2022-02-28 PROCEDURE — 63600175 PHARM REV CODE 636 W HCPCS: Performed by: NURSE PRACTITIONER

## 2022-02-28 PROCEDURE — 36415 COLL VENOUS BLD VENIPUNCTURE: CPT | Performed by: PHYSICIAN ASSISTANT

## 2022-02-28 PROCEDURE — 63600175 PHARM REV CODE 636 W HCPCS: Performed by: PHYSICIAN ASSISTANT

## 2022-02-28 PROCEDURE — 99233 SBSQ HOSP IP/OBS HIGH 50: CPT | Mod: ,,, | Performed by: INTERNAL MEDICINE

## 2022-02-28 PROCEDURE — 99233 PR SUBSEQUENT HOSPITAL CARE,LEVL III: ICD-10-PCS | Mod: ,,, | Performed by: INTERNAL MEDICINE

## 2022-02-28 PROCEDURE — 99233 SBSQ HOSP IP/OBS HIGH 50: CPT | Mod: ,,, | Performed by: PHYSICIAN ASSISTANT

## 2022-02-28 PROCEDURE — 99900035 HC TECH TIME PER 15 MIN (STAT)

## 2022-02-28 PROCEDURE — 99233 PR SUBSEQUENT HOSPITAL CARE,LEVL III: ICD-10-PCS | Mod: ,,, | Performed by: PHYSICIAN ASSISTANT

## 2022-02-28 PROCEDURE — 20600001 HC STEP DOWN PRIVATE ROOM

## 2022-02-28 RX ORDER — OXYBUTYNIN CHLORIDE 5 MG/1
5 TABLET ORAL ONCE
Status: COMPLETED | OUTPATIENT
Start: 2022-02-28 | End: 2022-02-28

## 2022-02-28 RX ORDER — INSULIN ASPART 100 [IU]/ML
5 INJECTION, SOLUTION INTRAVENOUS; SUBCUTANEOUS
Status: DISCONTINUED | OUTPATIENT
Start: 2022-02-28 | End: 2022-02-28

## 2022-02-28 RX ADMIN — ISOSORBIDE MONONITRATE 30 MG: 30 TABLET, EXTENDED RELEASE ORAL at 08:02

## 2022-02-28 RX ADMIN — DOXYCYCLINE HYCLATE 100 MG: 100 TABLET, COATED ORAL at 08:02

## 2022-02-28 RX ADMIN — HYDRALAZINE HYDROCHLORIDE 100 MG: 25 TABLET, FILM COATED ORAL at 08:02

## 2022-02-28 RX ADMIN — CHOLECALCIFEROL TAB 25 MCG (1000 UNIT) 2000 UNITS: 25 TAB at 08:02

## 2022-02-28 RX ADMIN — GUAIFENESIN AND DEXTROMETHORPHAN HYDROBROMIDE 1 TABLET: 600; 30 TABLET, EXTENDED RELEASE ORAL at 08:02

## 2022-02-28 RX ADMIN — IPRATROPIUM BROMIDE AND ALBUTEROL SULFATE 3 ML: 2.5; .5 SOLUTION RESPIRATORY (INHALATION) at 07:02

## 2022-02-28 RX ADMIN — FLUTICASONE PROPIONATE 100 MCG: 50 SPRAY, METERED NASAL at 08:02

## 2022-02-28 RX ADMIN — PRAVASTATIN SODIUM 40 MG: 40 TABLET ORAL at 08:02

## 2022-02-28 RX ADMIN — FLUTICASONE FUROATE AND VILANTEROL TRIFENATATE 1 PUFF: 100; 25 POWDER RESPIRATORY (INHALATION) at 08:02

## 2022-02-28 RX ADMIN — HYDRALAZINE HYDROCHLORIDE 100 MG: 25 TABLET, FILM COATED ORAL at 02:02

## 2022-02-28 RX ADMIN — HEPARIN SODIUM 5000 UNITS: 5000 INJECTION INTRAVENOUS; SUBCUTANEOUS at 02:02

## 2022-02-28 RX ADMIN — Medication 250 MG: at 08:02

## 2022-02-28 RX ADMIN — INSULIN ASPART 5 UNITS: 100 INJECTION, SOLUTION INTRAVENOUS; SUBCUTANEOUS at 05:02

## 2022-02-28 RX ADMIN — IPRATROPIUM BROMIDE AND ALBUTEROL SULFATE 3 ML: 2.5; .5 SOLUTION RESPIRATORY (INHALATION) at 02:02

## 2022-02-28 RX ADMIN — CEFTRIAXONE 1 G: 1 INJECTION, SOLUTION INTRAVENOUS at 12:02

## 2022-02-28 RX ADMIN — HEPARIN SODIUM 5000 UNITS: 5000 INJECTION INTRAVENOUS; SUBCUTANEOUS at 06:02

## 2022-02-28 RX ADMIN — CARVEDILOL 25 MG: 25 TABLET, FILM COATED ORAL at 08:02

## 2022-02-28 RX ADMIN — TIOTROPIUM BROMIDE INHALATION SPRAY 2 PUFF: 3.12 SPRAY, METERED RESPIRATORY (INHALATION) at 08:02

## 2022-02-28 RX ADMIN — INSULIN ASPART 3 UNITS: 100 INJECTION, SOLUTION INTRAVENOUS; SUBCUTANEOUS at 12:02

## 2022-02-28 RX ADMIN — CETIRIZINE HYDROCHLORIDE 10 MG: 5 TABLET ORAL at 08:02

## 2022-02-28 RX ADMIN — OXYBUTYNIN CHLORIDE 5 MG: 5 TABLET ORAL at 03:02

## 2022-02-28 RX ADMIN — DILTIAZEM HYDROCHLORIDE 240 MG: 120 CAPSULE, COATED, EXTENDED RELEASE ORAL at 08:02

## 2022-02-28 RX ADMIN — HEPARIN SODIUM 5000 UNITS: 5000 INJECTION INTRAVENOUS; SUBCUTANEOUS at 09:02

## 2022-02-28 RX ADMIN — IPRATROPIUM BROMIDE AND ALBUTEROL SULFATE 3 ML: 2.5; .5 SOLUTION RESPIRATORY (INHALATION) at 09:02

## 2022-02-28 RX ADMIN — MONTELUKAST 10 MG: 10 TABLET, FILM COATED ORAL at 08:02

## 2022-02-28 NOTE — ASSESSMENT & PLAN NOTE
Worsening SOB with productive cough with green sputum  - Afebrile without leukocytosis.   -Continue supplemental oxygen as needed, weaned to room air  - CTX/Doxy (given report of thick green sputum) and Predinsone x 5d- completed 2/26  - scheduled duonebs [id number]  - started mucinex-DM BID, tessalon perles TID PRN cough  -Continue daily inhalers  - monitor for hyperglycemia while on steroids

## 2022-02-28 NOTE — ASSESSMENT & PLAN NOTE
81 y.o. female with a PMHx of CAD, ischemic cardiomyopathy with LBBB s/p biventricular ICD, CHF (EF 50%, global hypokinesis, grade II diastolic dysfunction, evidence of pulmonary HTN), CKD4, IDDM (A1c 5.9), breast cancer, HTN, asthma, COPD, and HLD who is being treated for COPD/CHF exacerbation. Cr elevation to 3.2 from baseline on 2.1. Increased UPr to 6.85 from previous UPCr 5.33. Metabolic acidosis, increased BUN. Retroperitoneal US shows changes of medical renal disease, simple to minimally complex cysts. FeUrea of 21.1, indicates likely pre-renal from diuretics and volume loss. I/O net negative 2 L.   Lab Results   Component Value Date    CREATININE 2.3 (H) 02/28/2022     - Urine microscopy on Monday if Cr still elevated.   - BMP QD  - Rec UPCr ratio  - Will need outpatient follow up with Dr. Escalante on discharge  - Strict I&Os and daily weights   - Trend sCr  - Avoid nephrotoxic agents such as NSAIDs, gadolinium, ACEi, ARBs and IV radiocontrast.  - Renally dose meds to current GFR.  - Maintain MAP > 65.  - No indications for HD at this time.   - Maintain electrolytes at goal: Mg > 2, Phos > 3, K > 4.

## 2022-02-28 NOTE — PROGRESS NOTES
[facility] - Oncology ([facility])  [facility] Medicine  Progress Note    Patient Name: Myesha Quinones  MRN: [id number]  Patient Class: IP- Inpatient   Admission Date: 2/21/2022  Length of Stay: 6 days  Attending Physician: Estefanía Goncalves MD  Primary Care Provider: Catrachita Rothman MD        Subjective:     Principal Problem:Acute renal failure superimposed on stage 4 chronic kidney disease        HPI:  Myesha Quinones is a 81 y.o. female with a PMHx of CAD, ischemic cardiomyopathy with LBBB s/p biventricular ICD, CHF, CKD4, IDDM, breast cancer, HTN, asthma, COPD, and HLD who presents to the ED with complaints of shortness of breath. The patient reports shortness of breath beginning about 12 days ago. She reports her shortness of breath is worse with exertion. She was seen in cardiology clinic and started on lasix 20mg daily and reports weight loss of 5lbs and significant improvement of BLE edema. However, her dyspnea has been worsening. She reports a chronic cough that has become productive over the last week, noting green sputum. Endorses associated fatigue and generalized weakness. Denies orthopnea, PND, chest pain, palpitations, fever, chills, nausea, vomiting, diarrhea, or dysuria.    In the ED, patient found to be hypoxic requiring 2L O2 via nasal cannula. Patient also hypertensive and mildly tachycardic, afebrile. CBC unremarkable. CMP with baseline CKD. , improved from previous. Troponin WNL. Lactate and procal in process. UA negative for infection. CXR Stable right pleural fluid. Stable bilateral reticular opacities suggestive of pulmonary edema or infection. EKG with ventricularly paced rhythm, rate 84. The patient received oral hydralazine, duo neb treatment, and IV lasix in the ED.      Overview/Hospital Course:  Mrs. Quinones was admitted to observation for CHF and COPD exacerbation. Afebrile without leukocytosis. Started on Lasix IV 20 mg BID and scheduled duonebs, prednisone, and  antibiotics. Diuresed well, net neg 2L, however her Cr continues to increase. Suspect over diuresis. Will check urine studies, RP sono unremarkable. Give small IVF bolus. Nephrology consulted for further assistance, agree with over diuresis. Continue fluid restriction and T2DM control. BMP BID, Cr improving and BG. BP continues to be uncontrolled, IMDUR added to regimen. 2/28 patient began having significant urinary retention, bladder scan reveals >1L. Start oxybutynin and monitor, might need lauren and urology fu at [place]      Interval History: Cr back to [facility] this AM. Overnight she had difficulties voiding and required straight cath. BP also was high, IMDUR added to regimen. Pt seen at bedside with daughter present. No ocmplaints, says she has urinated twice already this AM without pain or difficulty. She had about 650 cc output charted for the day however RN did a bladder scan this afternoon that revealed >999cc. Start Oxybutynin and monitor.     Significantly reduced insulin orders given VIRGILIO resolution and prednisone completion    Ok to resume home lasix dose at [place] per nephro    Review of Systems   Constitutional:  Positive for fatigue. Negative for fever.   HENT:  Positive for congestion.    Respiratory:  Positive for cough, shortness of breath and wheezing.    Cardiovascular:  Negative for leg swelling.   Gastrointestinal:  Negative for abdominal pain.   Genitourinary:  Negative for decreased urine volume, difficulty urinating, dysuria and frequency.        + retention   Musculoskeletal:  Negative for arthralgias.   Skin:  Negative for color change.   Neurological:  Positive for headaches. Negative for dizziness.   Psychiatric/Behavioral:  Negative for confusion.    Objective:     Vital Signs (Most Recent):  Temp: 97.8 °F (36.6 °C) (02/28/22 1200)  Pulse: 69 (02/28/22 1505)  Resp: 16 (02/28/22 1402)  BP: (!) 155/67 (02/28/22 1200)  SpO2: (!) 94 % (02/28/22 1402)   Vital Signs (24h Range):  Temp:  [97.1 °F (36.2  °C)-98 °F (36.7 °C)] 97.8 °F (36.6 °C)  Pulse:  [57-88] 69  Resp:  [16-21] 16  SpO2:  [90 %-98 %] 94 %  BP: (124-186)/(58-75) 155/67     Weight: 64.5 kg (142 lb 3.2 oz)  Body mass index is 25.19 kg/m².    Intake/Output Summary (Last 24 hours) at 2/28/2022 1541  Last data filed at 2/28/2022 1130  Gross per 24 hour   Intake 315.47 ml   Output 2750 ml   Net -2434.53 ml      Physical Exam  Vitals and nursing note reviewed.   Constitutional:       General: She is not in acute distress.     Appearance: Normal appearance. She is not ill-appearing.   Eyes:      Extraocular Movements: Extraocular movements intact.   Cardiovascular:      Rate and Rhythm: Normal rate and regular rhythm.   Pulmonary:      Effort: Pulmonary effort is normal. No respiratory distress.      Breath sounds: Wheezing (mild) present.      Comments: Speaking in full sentences ORA; breath sounds coarse  Abdominal:      General: Abdomen is flat. There is no distension.   Musculoskeletal:      Right lower leg: No edema.      Left lower leg: No edema.      Comments: Chronic RUE swelling from prior mastectomy    Skin:     General: Skin is warm and dry.   Neurological:      General: No focal deficit present.      Mental Status: She is alert.   Psychiatric:         Mood and Affect: Mood normal.       Significant Labs: All pertinent labs within the past 24 hours have been reviewed.    Significant Imaging: I have reviewed all pertinent imaging results/findings within the past 24 hours.      Assessment/Plan:      * Acute renal failure superimposed on stage 4 chronic kidney disease  Hyponatremia, urinary retention  - Cr 2.3>>3.5>3.2, baseline ~2    Finally downtrending, at [facility] 2/28  - suspect prerenal in setting of diuresis  - give 500cc IVF, suspect hypovolemia hyponatremia (corrected Na 129)  - holding home lasix/ losartan  - UA, urine studies, osmol, RP sono completed: consistent with hyponatremia hypovolemia   Nephrology consulted for further assistance for  persistent hyponatremia and VIRGILIO:   - Recommend fluid restriction of 1 to 1.5 L   - Will consider Lasix drip if Na continues to trend down and inadequate UOP for preferential water over Na diuresis.   - Will consider tolvaptan if Na decreases to low 120s without improvement in Cr, poor UOP and if BG better controlled  - Hold Lasix bolus doses.       - BID BMP  - ok to resume home lasix at [facility] per nephro  - 2/28 pt with acute urinary retention, start oxybutynin and monitor  - avoid nephrotoxic agents  - monitor with daily labs  - strict I/Os    Chronic obstructive pulmonary disease with acute exacerbation  Worsening SOB with productive cough with green sputum  - Afebrile without leukocytosis.   -Continue supplemental oxygen as needed, weaned to room air  - CTX/Doxy (given report of thick green sputum) and Predinsone x 5d- completed 2/26  - scheduled duonebs [id number]  - started mucinex-DM BID, tessalon perles TID PRN cough  -Continue daily inhalers  - monitor for hyperglycemia while on steroids    Acute on chronic combined systolic and diastolic heart failure  Nonischemic cardiomyopathy  Resolving  Patient is identified as having Combined Systolic and Diastolic heart failure that is Acute on Chronic. CHF is currently uncontrolled due to volume overload due to: Continued edema of extremities and JVD, Rales/crackles on pulmonary exam and Pulmonary edema/pleural effusion on CXR. Latest ECHO performed and demonstrates- Results for orders placed during the hospital encounter of 03/23/21    Echo Color Flow Doppler? Yes    Interpretation Summary  · The left ventricle is normal in size with eccentric hypertrophy and low normal systolic function. The estimated ejection fraction is 50%  · There are segmental left ventricular wall motion abnormalities.  · Normal left ventricular diastolic function.  · Normal right ventricular size with normal right ventricular systolic function.  · Mild tricuspid regurgitation.  · The estimated PA  systolic pressure is 44 mmHg.  · There is pulmonary hypertension.  · Normal central venous pressure (3 mmHg).  . Continue Beta Blocker ACE/ARB Furosemide Nitrate/Vasodilator and monitor clinical status closely. Monitor on telemetry. Patient is on CHF pathway.  Monitor strict Is&Os and daily weights.  Place on fluid restriction of 1.5 L. Continue to stress to patient importance of self efficacy and  on diet for CHF. Last BNP reviewed- and noted below   Recent Labs   Lab 02/25/22  0249   *   -ECHO ordered  - home lasix 20 mg daily  - hospital lasix 20mg IV BID--> held in setting of VIRGILIO  Give small IVF back    Type 2 diabetes mellitus with hyperglycemia, with long-term current use of insulin  Patient's FSGs are controlled on current hypoglycemics.   Last A1c reviewed-   Lab Results   Component Value Date    HGBA1C 5.9 (H) 02/22/2022     Most recent fingerstick glucose reviewed-   Recent Labs   Lab 02/26/22  1653 02/26/22  2053 02/27/22  0727 02/27/22  1153   POCTGLUCOSE 302* 165* 113* 168*     Current correctional scale  Low  Maintain anti-hyperglycemic dose as follows-   Antihyperglycemics (From admission, onward)            Start     Stop Route Frequency Ordered    02/27/22 2100  insulin detemir U-100 pen 17 Units         -- SubQ 2 times daily 02/27/22 1311    02/26/22 1130  insulin aspart U-100 pen 10 Units         -- SubQ 3 times daily with meals 02/26/22 0826    02/23/22 1018  insulin aspart U-100 pen 0-5 Units         -- SubQ Before meals & nightly PRN 02/23/22 0919      Accuchecks AC/HS  Diabetic/cardiac diet,  Monitor closely while on prednisone>> increasing daily but cautiously in setting of VIRGILIO, now reducing since prednisone course is completed    Hyperlipidemia associated with type 2 diabetes mellitus   Patient is chronically on statin.will continue for now. Monitor clinically. Last LDL was   Lab Results   Component Value Date    LDLCALC 43.8 (L) 02/08/2022       Iron deficiency  anemia  Patient's anemia is currently controlled.   Current CBC reviewed-   Lab Results   Component Value Date    HGB 10.9 (L) 02/21/2022    HCT 35.4 (L) 02/21/2022     Monitor serial CBC and transfuse if patient becomes hemodynamically unstable, symptomatic or H/H drops below 7/21.     Hypertension associated with diabetes  -Initially hypertensive, improved with home medications.  -Continue coreg 25mg BID, cardizem 240mg daily, hydralazine 100mg TID, ibersartan 300mg qhs.  - cardiac diet    Obstructive sleep apnea  -Continue CPAP qhs    CKD (chronic kidney disease) stage 4, GFR 15-29 ml/min  - see VIRGILIO    Acute respiratory failure with hypoxia and hypercarbia  Patient with acute hypoxemic respiratory failure likely secondary to CHF exacerbation + COPD exacerbation. Patient reporting worsening cough for the last week with green sputum. CXR with stable right pleural fluid. Stable bilateral reticular opacities suggestive of pulmonary edema or infection.    -Procalcitonin and lactic WNL. Do not suspect PNA  -CTX/ doxy switched to azithromycin for COPD exacerbation + prednisone, scheduled breathing treatments  - diuresis held in setting of VIRGILIO  - wean oxygen as tolerated, currently on RA  CT chest: Obliteration of the right bronchus intermedius and bilateral lower lobes basal segments bronchi possibly by mucous, aspiration or pneumonia.  There is complete volume loss of the middle lobe and patchy subsegmental atelectasis or airspace disease of the basal segments of the bilateral lower lobes right more than left.  Consider aspiration or pneumonia.     Enlarged precarinal and sub carinal nodes could be reactive to the underlying inflammatory process, less likely a neoplastic process not excluded.     Right upper lobe pleural base nodule, For a solid nodule >8 mm, Fleischner Society 2017 guidelines recommend considering follow-up CT at 3 months.     Start CPT, fu with pulm    Biventricular ICD (implantable  cardioverter-defibrillator) in place  Seen on CXR  - stable    Asthma, chronic  -Continue montelukast  -PRN duo nebs      VTE Risk Mitigation (From admission, onward)         Ordered     heparin (porcine) injection 5,000 Units  Every 8 hours         02/22/22 0335     IP VTE HIGH RISK PATIENT  Once         02/22/22 0335     Place sequential compression device  Until discontinued         02/22/22 0335                Discharge Planning   ALIX: 3/1/2022     Code Status: Full Code   Is the patient medically ready for discharge?: No    Reason for patient still in hospital (select all that apply): Patient new problem, Patient trending condition and Treatment  Discharge Plan A: Home with family, Home Health                  Jolynn Kemp PA-C  Department of Hospital Medicine   [facility] - Oncology ([facility])

## 2022-02-28 NOTE — PT/OT/SLP PROGRESS
Physical Therapy Treatment    Patient Name:  Myesha Quinones   MRN:  [id number]    Recommendations:     Discharge Recommendations:  home health PT   Discharge Equipment Recommendations: shower chair, hip kit   Barriers to discharge: None    Assessment:     Myesha Quinones is a 81 y.o. female admitted with a medical diagnosis of Acute renal failure superimposed on stage 4 chronic kidney disease.  She presents with the following impairments/functional limitations:  weakness, impaired endurance, impaired self care skills, impaired functional mobilty, gait instability, impaired balance, impaired cardiopulmonary response to activity, edema. Myesha Quinones would benefit from acute PT intervention to address listed functional deficits, provide patient/caregiver education, reduce fall risk, and maximize (I) and safety with functional mobility.    Pt continues to demonstrate improvement and progress towards goals. Able to ambulate ~120 ft with 1xHHA today. Pt also reports adherence to HEP which she believes is helping to improve her activity tolerance. Pt will continue to benefit from acute PT services in order to maximize safety and (I) with functional mobility.    After hospital discharge, pt would benefit from HHPT to continue addressing therapy impairments.    Rehab Prognosis: Good; patient would benefit from acute skilled PT services to address these deficits and reach maximum level of function.      Plan:     During this hospitalization, patient to be seen 3 x/week to address the identified rehab impairments via gait training, therapeutic activities, therapeutic exercises, neuromuscular re-education and progress toward the following goals:    · Plan of Care Expires:  03/22/22    This plan of care has been discussed with the patient/caregiver, who was included in its development and is in agreement with the identified goals and treatment plan.     Subjective     Communicated with RN prior to session.   Patient agreeable to participate.     Chief Complaint: fatigue  Patient/Family Comments/goals: to go home  Pt pain prior to session: 0/10  Pt pain following session: 0/10    Objective:     Patient found up in chair with telemetry  upon PT entry to room.    General Precautions: Standard, fall, respiratory   Orthopedic Precautions:N/A   Braces: N/A     Functional Mobility:    Bed Mobility:  · Not assessed 2/2 Pt up in chair    Transfers:   · Sit to Stand Transfer: Stand-by Assistance  from bedside cair with no AD             Gait:  · Patient received gait training ~120 feet with Contact Guard Assistance and 1x HHA for balance  · Gait Assessment: decreased speed, step length, swing through, heel strike, forward flexed posture, and downward gaze.   · PT providing verbal and tactile cues for improved upright posture, gaze direction, and gait fluidity.     Balance:  · Static Sit: Independent in bedside chair x ~5 minutes  · Static Stand: Stand-By Assist with no AD      Therapeutic Activities/Exercises     Pt quizzed on HEP; reports adherence and believes exercises are helping to improve activity tolerance    Patient educated on the importance of early mobility to prevent functional decline during hospital stay    Patient was instructed to utilize staff assistance for mobility/transfers.  Patient was educated on PT POC and all questions answered within PT scope of practice.    Patient/caregiver able to verbalize understanding; will follow-up with pt/caregiver during current admit for additional questions/concerns within scope of practice.     White board updated.     AM-PAC 6 CLICK MOBILITY  Total Score:17     Patient left up in chair with all lines intact, call button in reach, RN notified and daughter present.        History/Goals:     PAST MEDICAL HISTORY:  Past Medical History:   Diagnosis Date    Acute hypoxemic respiratory failure 12/19/2019    Acute right-sided thoracic back pain 12/9/2019    Allergy     Asthma      Basal cell carcinoma     left forehead    Basal cell carcinoma     left nose    Basal cell carcinoma 05/20/2015    right nose    Basal cell carcinoma 12/22/2015    left lower post neck    Basal cell carcinoma 12/03/2019    left ant scalp     Breast cancer     CAD (coronary artery disease)     Cardiomyopathy     Cardiomyopathy, ischemic     Cataract     CHF (congestive heart failure)     Chronic kidney disease, stage 3, mod decreased GFR 2/14/2017    Colon polyp 2011    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, with long-term current use of insulin 2/22/2018    COPD (chronic obstructive pulmonary disease)     COPD exacerbation 4/8/2018    Current mild episode of major depressive disorder without prior episode 1/25/2022    Defibrillator discharge     Diabetes mellitus     Diabetes mellitus type II     Diabetes with neurologic complications     Goiter     MNG    HX: breast cancer     Hyperlipidemia     Hypertension     Iron deficiency anemia 5/16/2017    Left kidney mass     Meningioma     Microalbuminuria due to type 2 diabetes mellitus 1/26/2022    Osteoporosis, postmenopausal     Pneumonia 12/8/2019    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    Pseudomonas pneumonia     Skin cancer     s/p excision    Sleep apnea     CPAP    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/2015    mid forehead    Unspecified vitamin D deficiency     Ventricular tachycardia     Vitamin B12 deficiency     Vitamin D deficiency disease        Past Surgical History:   Procedure Laterality Date    BASAL CELL CARCINOMA EXCISION      posterior neck and nose    BREAST BIOPSY      BREAST CYST EXCISION Left     BREAST SURGERY      CARDIAC DEFIBRILLATOR PLACEMENT      x 2    CATARACT EXTRACTION W/  INTRAOCULAR LENS IMPLANT Bilateral     CHOLECYSTECTOMY      COLONOSCOPY N/A 11/5/2019    Procedure: COLONOSCOPY;  Surgeon: Boaz Botello,  MD;  Location: [facility] ENDO (4TH FLR);  Service: Endoscopy;  Laterality: N/A;  AICD - [employer] - sm    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    FRACTURE SURGERY      left elbow and wrist as a child    HYSTERECTOMY      MASTECTOMY Right     REPLACEMENT OF IMPLANTABLE CARDIOVERTER-DEFIBRILLATOR (ICD) GENERATOR N/A 2018    Procedure: REPLACEMENT, ICD GENERATOR;  Surgeon: Jan Mckeon MD;  Location: [facility] EP LAB;  Service: Cardiology;  Laterality: N/A;  DONNA, CRT-D gen change, MDT, MAC, SK, 3 Prep    REVISION OF SKIN POCKET FOR CARDIOVERTER-DEFIBRILLATOR  2018    Procedure: Revision, Skin Pocket, For Cardioverter-Defibrillator;  Surgeon: Jan Mckeon MD;  Location: [facility] EP LAB;  Service: Cardiology;;    SQUAMOUS CELL CARCINOMA EXCISION      remved from forehead    TONSILLECTOMY         GOALS:   Multidisciplinary Problems     Physical Therapy Goals        Problem: Physical Therapy Goal    Goal Priority Disciplines Outcome Goal Variances Interventions   Physical Therapy Goal     PT, PT/OT Ongoing, Progressing     Description: Goals to be met by:  3/8/22    Patient will increase functional independence with mobility by performin. Supine to sit with [place]  2. Sit to supine with [place]  3. Sit to stand transfer with Supervision  4. Gait  x 50 feet with Contact Guard Assistance using Rolling Walker.   5. Lower extremity exercise program x 20 reps per handout, with independence                       Time Tracking:     PT Received On: 22  PT Start Time: 957     PT Stop Time: 1007  PT Total Time (min): 10 min     Billable Minutes: Gait Training 10      [place]  Aimee, PT  2022

## 2022-02-28 NOTE — SUBJECTIVE & OBJECTIVE
Interval History: Cr back to BL this AM. Overnight she had difficulties voiding and required straight cath. BP also was high, IMDUR added to regimen. Pt seen at bedside with daughter present. No ocmplaints, says she has urinated twice already this AM without pain or difficulty. She had about 650 cc output charted for the day however RN did a bladder scan this afternoon that revealed >999cc. Start Oxybutynin and monitor.     Significantly reduced insulin orders given VIRGILIO resolution and prednisone completion    Ok to resume home lasix dose at dc per nephro    Review of Systems   Constitutional:  Positive for fatigue. Negative for fever.   HENT:  Positive for congestion.    Respiratory:  Positive for cough, shortness of breath and wheezing.    Cardiovascular:  Negative for leg swelling.   Gastrointestinal:  Negative for abdominal pain.   Genitourinary:  Negative for decreased urine volume, difficulty urinating, dysuria and frequency.        + retention   Musculoskeletal:  Negative for arthralgias.   Skin:  Negative for color change.   Neurological:  Positive for headaches. Negative for dizziness.   Psychiatric/Behavioral:  Negative for confusion.    Objective:     Vital Signs (Most Recent):  Temp: 97.8 °F (36.6 °C) (02/28/22 1200)  Pulse: 69 (02/28/22 1505)  Resp: 16 (02/28/22 1402)  BP: (!) 155/67 (02/28/22 1200)  SpO2: (!) 94 % (02/28/22 1402)   Vital Signs (24h Range):  Temp:  [97.1 °F (36.2 °C)-98 °F (36.7 °C)] 97.8 °F (36.6 °C)  Pulse:  [57-88] 69  Resp:  [16-21] 16  SpO2:  [90 %-98 %] 94 %  BP: (124-186)/(58-75) 155/67     Weight: 64.5 kg (142 lb 3.2 oz)  Body mass index is 25.19 kg/m².    Intake/Output Summary (Last 24 hours) at 2/28/2022 1541  Last data filed at 2/28/2022 1130  Gross per 24 hour   Intake 315.47 ml   Output 2750 ml   Net -2434.53 ml      Physical Exam  Vitals and nursing note reviewed.   Constitutional:       General: She is not in acute distress.     Appearance: Normal appearance. She is not  ill-appearing.   Eyes:      Extraocular Movements: Extraocular movements intact.   Cardiovascular:      Rate and Rhythm: Normal rate and regular rhythm.   Pulmonary:      Effort: Pulmonary effort is normal. No respiratory distress.      Breath sounds: Wheezing (mild) present.      Comments: Speaking in full sentences ORA; breath sounds coarse  Abdominal:      General: Abdomen is flat. There is no distension.   Musculoskeletal:      Right lower leg: No edema.      Left lower leg: No edema.      Comments: Chronic RUE swelling from prior mastectomy    Skin:     General: Skin is warm and dry.   Neurological:      General: No focal deficit present.      Mental Status: She is alert.   Psychiatric:         Mood and Affect: Mood normal.       Significant Labs: All pertinent labs within the past 24 hours have been reviewed.    Significant Imaging: I have reviewed all pertinent imaging results/findings within the past 24 hours.

## 2022-02-28 NOTE — ASSESSMENT & PLAN NOTE
Proteinuria of 6.78, increased from previous UPCr of 5.33. Likely 2/2 to longstanding diabetic nephropathy. DM well controlled now compared to before.   Consider follow up with rheum outpatient for previous positive ZURDO

## 2022-02-28 NOTE — PROGRESS NOTES
[facility] - On[facility] ([facility])  Nephrology  Progress Note    Patient Name: Myesha Quinones  MRN: [id number]  Admission Date: 2/21/2022  Hospital Length of Stay: 6 days  Attending Provider: Estefanía Goncalves MD   Primary Care Physician: Catrachita Rothman MD  Principal Problem:Acute renal failure superimposed on stage 4 chronic kidney disease    Subjective:     HPI: Myesha Quinones is a 81 y.o. female with a PMHx of CAD, ischemic cardiomyopathy with LBBB s/p biventricular ICD, CHF (EF 50%, global hypokinesis, grade II diastolic dysfunction, evidence of pulmonary HTN), CKD4, IDDM (A1c 5.9), breast cancer, HTN, asthma, COPD, and HLD who presents to the ED with complaints of shortness of breath and green sputum production likely due to COPD/CHF exacerbation. Being treated with albuterol nebs, CTX/doxycycline, mucinex, prednisone for 5 days, tiotropium, fluticasone/vilanterol by hospital medicine. Her blood glucose has been more elevated since starting the steroids.     Her SOB is worse with exertion. She has received Lasix in the ED and 20 mg BID until 2/22. Since then her Cr trended up from Baseline 2.1 to 3.2. Concern for overdiureses, so small fluid bolus 500 cc was given on 2/24. She is already established with Dr. Tanner at nephrology clinic, diagnosed with stable CKD stage IV 2/2 diabetic nephropathy with UPCr of 3.13 to 5.33 previously. UPCr now 6.78. Urine Na 50, urine Cr 46, urine urea 227, urine K 30. Retroperitoneal US shows medical renal disease, simple to minimally complex cysts.     Nephrology has consulted for hyponatremia, VIRGILIO on CKD IV, acidosis, and proteinuria.              Interval History: NAEO VSS Over the weekend the patient has produced sufficient urine output of 3.5L. Cr has fallen from 2.2 to 2.3 today which is at patient's baseline.     Review of patient's allergies indicates:   Allergen Reactions    Iodine and iodide containing products Hives    Nifedipine      weakness     Current  Facility-Administered Medications   Medication Frequency    acetaminophen tablet 650 mg Q6H PRN    albuterol-ipratropium 2.5 mg-0.5 mg/3 mL nebulizer solution 3 mL Q4H PRN    albuterol-ipratropium 2.5 mg-0.5 mg/3 mL nebulizer solution 3 mL Q6H WAKE    ascorbic acid (vitamin C) tablet 250 mg Daily    benzonatate capsule 100 mg TID PRN    carvediloL tablet 25 mg BID    cefTRIAXone (ROCEPHIN) 1 g/50 mL D5W IVPB Q24H    cetirizine tablet 10 mg Daily    cloNIDine 0.1 mg/24 hr td ptwk 1 patch Every Tues    dextromethorphan-guaiFENesin  mg per 12 hr tablet 1 tablet BID    dextrose 10% bolus 125 mL PRN    dextrose 10% bolus 250 mL PRN    diltiaZEM 24 hr capsule 240 mg Daily    doxycycline tablet 100 mg Q12H    fluticasone furoate-vilanteroL 100-25 mcg/dose diskus inhaler 1 puff Daily    fluticasone propionate 50 mcg/actuation nasal spray 100 mcg Daily    glucagon (human recombinant) injection 1 mg PRN    glucose chewable tablet 16 g PRN    glucose chewable tablet 24 g PRN    heparin (porcine) injection 5,000 Units Q8H    hydrALAZINE tablet 100 mg TID    insulin aspart U-100 pen 0-5 Units QID (AC + HS) PRN    insulin detemir U-100 pen 5 Units BID    isosorbide mononitrate 24 hr tablet 30 mg Daily    montelukast tablet 10 mg QHS    nitroGLYCERIN SL tablet 0.4 mg Q5 Min PRN    pravastatin tablet 40 mg Daily    sodium chloride 0.9% flush 10 mL PRN    tiotropium bromide 2.5 mcg/actuation inhaler 2 puff Daily    vitamin D 1000 units tablet 2,000 Units Daily       Objective:     Vital Signs (Most Recent):  Temp: 97.8 °F (36.6 °C) (02/28/22 1200)  Pulse: 69 (02/28/22 1505)  Resp: 16 (02/28/22 1402)  BP: (!) 155/67 (02/28/22 1200)  SpO2: (!) 94 % (02/28/22 1402)  O2 Device (Oxygen Therapy): room air (02/28/22 1402)   Vital Signs (24h Range):  Temp:  [97.1 °F (36.2 °C)-98 °F (36.7 °C)] 97.8 °F (36.6 °C)  Pulse:  [57-88] 69  Resp:  [16-21] 16  SpO2:  [90 %-98 %] 94 %  BP: (124-186)/(58-75) 155/67      Weight: 64.5 kg (142 lb 3.2 oz) (02/26/22 0500)  Body mass index is 25.19 kg/m².  Body surface area is 1.69 meters squared.    I/O last 3 completed shifts:  In: 815.5 [P.O.:750; IV Piggyback:65.5]  Out: 4400 [Urine:4400]    Physical Exam  Vitals and nursing note reviewed.   Constitutional:       General: She is not in acute distress.     Appearance: Normal appearance. She is not ill-appearing.   HENT:      Head: Normocephalic and atraumatic.      Nose: Nose normal.   Eyes:      Extraocular Movements: Extraocular movements intact.   Cardiovascular:      Rate and Rhythm: Normal rate and regular rhythm.      Pulses: Normal pulses.      Heart sounds: Normal heart sounds.   Pulmonary:      Effort: Pulmonary effort is normal. No respiratory distress.      Breath sounds: No wheezing or rales.   Abdominal:      General: Abdomen is flat. There is no distension.      Palpations: Abdomen is soft.      Tenderness: There is no abdominal tenderness. There is no guarding.   Musculoskeletal:         General: Normal range of motion.      Cervical back: Normal range of motion.      Right lower leg: No edema.      Left lower leg: No edema.      Comments: LLE swelling chronic   Skin:     General: Skin is warm and dry.      Capillary Refill: Capillary refill takes less than 2 seconds.      Coloration: Skin is not jaundiced.   Neurological:      General: No focal deficit present.      Mental Status: She is alert and oriented to person, place, and time.   Psychiatric:         Mood and Affect: Mood normal.         Behavior: Behavior normal.       Significant Labs:  All labs within the past 24 hours have been reviewed.     Significant Imaging:  Reviewed.    Assessment/Plan:     * Acute renal failure superimposed on stage 4 chronic kidney disease  81 y.o. female with a PMHx of CAD, ischemic cardiomyopathy with LBBB s/p biventricular ICD, CHF (EF 50%, global hypokinesis, grade II diastolic dysfunction, evidence of pulmonary HTN), CKD4,  IDDM (A1c 5.9), breast cancer, HTN, asthma, COPD, and HLD who is being treated for COPD/CHF exacerbation. Cr elevation to 3.2 from baseline on 2.1. Increased UPr to 6.85 from previous UPCr 5.33. Metabolic acidosis, increased BUN. Retroperitoneal US shows changes of medical renal disease, simple to minimally complex cysts. FeUrea of 21.1, indicates likely pre-renal from diuretics and volume loss. I/O net negative 2 L.   Lab Results   Component Value Date    CREATININE 2.3 (H) 02/28/2022     - BMP QD  - Rec UPCr ratio  - Will need outpatient follow up with Dr. Escalante on discharge  - Strict I&Os and daily weights   - Trend sCr  - Avoid nephrotoxic agents such as NSAIDs, gadolinium, ACEi, ARBs and IV radiocontrast.  - Renally dose meds to current GFR.  - Maintain MAP > 65.  - No indications for HD at this time.   - Maintain electrolytes at goal: Mg > 2, Phos > 3, K > 4.    Acute hyponatremia  80 yo F with acute onset hyponatremia and VIRGILIO on CKD IV. Hyponatremia of 124 on 2/26, trending down from 136 > 132 >129. Asymptomatic. Patient reports drinking about three 16 oz bottles of water daily at home, and has been drinking 3-4 cups of water per day while in the hospital. Likely not primary polydipsia. Urine Na elevated at 50, likely from VIRGILIO and diuretics. Serum osm 300, urine osm 280. Hyperglycemia present with steroids, but corrected Na still low at 126. TSH WNL. Likely hypovolemic hyponatremia, diuretic induced and from renal losses. Improved. Euvolemic.   - Na now WNL  - Continue fluid restriction 1.5 L   - No Lasix at this time  - No need for tolvaptan at this time  - Monitor UOP. Should be around 30 cc/hour.   - No salt tablets or hypertonic saline at this time.  - Goal increase sodium 4-6 mM/24h    Proteinuria  Proteinuria of 6.78, increased from previous UPCr of 5.33. Likely 2/2 to longstanding diabetic nephropathy. DM well controlled now compared to before.   Consider follow up with rheum outpatient for previous  positive ZURDO        Thank you for your consult. I will follow-up with patient. Please contact us if you have any additional questions.    Jorge Brito MD  Nephrology  [facility] - [facility] ([facility])

## 2022-02-28 NOTE — SUBJECTIVE & OBJECTIVE
Interval History: NAEO VSS Over the weekend the patient has produced sufficient urine output of 3.5L. Cr has fallen from 2.2 to 2.3 today which is at patient's baseline.     Review of patient's allergies indicates:   Allergen Reactions    Iodine and iodide containing products Hives    Nifedipine      weakness     Current Facility-Administered Medications   Medication Frequency    acetaminophen tablet 650 mg Q6H PRN    albuterol-ipratropium 2.5 mg-0.5 mg/3 mL nebulizer solution 3 mL Q4H PRN    albuterol-ipratropium 2.5 mg-0.5 mg/3 mL nebulizer solution 3 mL Q6H WAKE    ascorbic acid (vitamin C) tablet 250 mg Daily    benzonatate capsule 100 mg TID PRN    carvediloL tablet 25 mg BID    cefTRIAXone (ROCEPHIN) 1 g/50 mL D5W IVPB Q24H    cetirizine tablet 10 mg Daily    cloNIDine 0.1 mg/24 hr td ptwk 1 patch Every Tues    dextromethorphan-guaiFENesin  mg per 12 hr tablet 1 tablet BID    dextrose 10% bolus 125 mL PRN    dextrose 10% bolus 250 mL PRN    diltiaZEM 24 hr capsule 240 mg Daily    doxycycline tablet 100 mg Q12H    fluticasone furoate-vilanteroL 100-25 mcg/dose diskus inhaler 1 puff Daily    fluticasone propionate 50 mcg/actuation nasal spray 100 mcg Daily    glucagon (human recombinant) injection 1 mg PRN    glucose chewable tablet 16 g PRN    glucose chewable tablet 24 g PRN    heparin (porcine) injection 5,000 Units Q8H    hydrALAZINE tablet 100 mg TID    insulin aspart U-100 pen 0-5 Units QID (AC + HS) PRN    insulin detemir U-100 pen 5 Units BID    isosorbide mononitrate 24 hr tablet 30 mg Daily    montelukast tablet 10 mg QHS    nitroGLYCERIN SL tablet 0.4 mg Q5 Min PRN    pravastatin tablet 40 mg Daily    sodium chloride 0.9% flush 10 mL PRN    tiotropium bromide 2.5 mcg/actuation inhaler 2 puff Daily    vitamin D 1000 units tablet 2,000 Units Daily       Objective:     Vital Signs (Most Recent):  Temp: 97.8 °F (36.6 °C) (02/28/22 1200)  Pulse: 69 (02/28/22 1505)  Resp: 16 (02/28/22 1402)  BP: (!)  155/67 (02/28/22 1200)  SpO2: (!) 94 % (02/28/22 1402)  O2 Device (Oxygen Therapy): room air (02/28/22 1402)   Vital Signs (24h Range):  Temp:  [97.1 °F (36.2 °C)-98 °F (36.7 °C)] 97.8 °F (36.6 °C)  Pulse:  [57-88] 69  Resp:  [16-21] 16  SpO2:  [90 %-98 %] 94 %  BP: (124-186)/(58-75) 155/67     Weight: 64.5 kg (142 lb 3.2 oz) (02/26/22 0500)  Body mass index is 25.19 kg/m².  Body surface area is 1.69 meters squared.    I/O last 3 completed shifts:  In: 815.5 [P.O.:750; IV Piggyback:65.5]  Out: 4400 [Urine:4400]    Physical Exam  Vitals and nursing note reviewed.   Constitutional:       General: She is not in acute distress.     Appearance: Normal appearance. She is not ill-appearing.   HENT:      Head: Normocephalic and atraumatic.      Nose: Nose normal.   Eyes:      Extraocular Movements: Extraocular movements intact.   Cardiovascular:      Rate and Rhythm: Normal rate and regular rhythm.      Pulses: Normal pulses.      Heart sounds: Normal heart sounds.   Pulmonary:      Effort: Pulmonary effort is normal. No respiratory distress.      Breath sounds: No wheezing or rales.   Abdominal:      General: Abdomen is flat. There is no distension.      Palpations: Abdomen is soft.      Tenderness: There is no abdominal tenderness. There is no guarding.   Musculoskeletal:         General: Normal range of motion.      Cervical back: Normal range of motion.      Right lower leg: No edema.      Left lower leg: No edema.      Comments: LLE swelling chronic   Skin:     General: Skin is warm and dry.      Capillary Refill: Capillary refill takes less than 2 seconds.      Coloration: Skin is not jaundiced.   Neurological:      General: No focal deficit present.      Mental Status: She is alert and oriented to person, place, and time.   Psychiatric:         Mood and Affect: Mood normal.         Behavior: Behavior normal.       Significant Labs:  All labs within the past 24 hours have been reviewed.     Significant  Imaging:  Reviewed.

## 2022-02-28 NOTE — ASSESSMENT & PLAN NOTE
80 yo F with acute onset hyponatremia and VIRGILIO on CKD IV. Hyponatremia of 124 on 2/26, trending down from 136 > 132 >129. Asymptomatic. Patient reports drinking about three 16 oz bottles of water daily at home, and has been drinking 3-4 cups of water per day while in the hospital. Likely not primary polydipsia. Urine Na elevated at 50, likely from VIRGILIO and diuretics. Serum osm 300, urine osm 280. Hyperglycemia present with steroids, but corrected Na still low at 126. TSH WNL. Likely hypovolemic hyponatremia, diuretic induced and from renal losses. Improved. Euvolemic.   - Na now WNL  - Continue fluid restriction 1.5 L   - No Lasix at this time  - No need for tolvaptan at this time  - Monitor UOP. Should be around 30 cc/hour.   - No salt tablets or hypertonic saline at this time.  - Goal increase sodium 4-6 mM/24h

## 2022-02-28 NOTE — ASSESSMENT & PLAN NOTE
Hyponatremia, urinary retention  - Cr 2.3>>3.5>3.2, baseline ~2    Finally downtrending, at BL 2/28  - suspect prerenal in setting of diuresis  - give 500cc IVF, suspect hypovolemia hyponatremia (corrected Na 129)  - holding home lasix/ losartan  - UA, urine studies, osmol, RP sono completed: consistent with hyponatremia hypovolemia   Nephrology consulted for further assistance for persistent hyponatremia and VIRGILIO:   - Recommend fluid restriction of 1 to 1.5 L   - Will consider Lasix drip if Na continues to trend down and inadequate UOP for preferential water over Na diuresis.   - Will consider tolvaptan if Na decreases to low 120s without improvement in Cr, poor UOP and if BG better controlled  - Hold Lasix bolus doses.       - BID BMP  - ok to resume home lasix at dc per nephro  - 2/28 pt with acute urinary retention, start oxybutynin and monitor  - avoid nephrotoxic agents  - monitor with daily labs  - strict I/Os

## 2022-02-28 NOTE — ASSESSMENT & PLAN NOTE
Nonischemic cardiomyopathy  Resolving  Patient is identified as having Combined Systolic and Diastolic heart failure that is Acute on Chronic. CHF is currently uncontrolled due to volume overload due to: Continued edema of extremities and JVD, Rales/crackles on pulmonary exam and Pulmonary edema/pleural effusion on CXR. Latest ECHO performed and demonstrates- Results for orders placed during the hospital encounter of 03/23/21    Echo Color Flow Doppler? Yes    Interpretation Summary  · The left ventricle is normal in size with eccentric hypertrophy and low normal systolic function. The estimated ejection fraction is 50%  · There are segmental left ventricular wall motion abnormalities.  · Normal left ventricular diastolic function.  · Normal right ventricular size with normal right ventricular systolic function.  · Mild tricuspid regurgitation.  · The estimated PA systolic pressure is 44 mmHg.  · There is pulmonary hypertension.  · Normal central venous pressure (3 mmHg).  . Continue Beta Blocker ACE/ARB Furosemide Nitrate/Vasodilator and monitor clinical status closely. Monitor on telemetry. Patient is on CHF pathway.  Monitor strict Is&Os and daily weights.  Place on fluid restriction of 1.5 L. Continue to stress to patient importance of self efficacy and  on diet for CHF. Last BNP reviewed- and noted below   Recent Labs   Lab 02/25/22  0249   *   -ECHO ordered  - home lasix 20 mg daily  - hospital lasix 20mg IV BID--> held in setting of VIRGILIO  Give small IVF back

## 2022-02-28 NOTE — PHYSICIAN QUERY
PT Name: Myesha Quinones  MR #: [id number]    DOCUMENTATION CLARIFICATION      CDS: Tatiana Sales RN, CCDS   Contact Information: [email or web address]    This form is a permanent document in the medical record.     Query Date: February 28, 2022    By submitting this query, we are merely seeking further clarification of documentation. Please utilize your independent clinical judgment when addressing the question(s) below.    The Medical Record contains the following:   Indicators   Supporting Clinical Findings Location in Medical Record    x Hypertension associated with diabetes documented  Hypertension associated with diabetes   Initially hypertensive, improved with home medications.    2/21 H&P, NP Gaston cameron Chronic condition(s)  81 y.o. female with a PMHx of CAD, ischemic cardiomyopathy with LBBB s/p biventricular ICD, CHF, CKD4, IDDM, breast cancer, HTN, asthma, COPD, and HLD who presents to the ED with complaints of shortness of breath...acute on chronic combined systolic and diastolic heart failure, nonischemic cardiomyopathy  2/21 H&P, NP Gaston cameron Vital signs    Initial Vitals [02/21/22 1831]   BP Pulse Resp Temp SpO2   (!) 219/91 84 (!) 24 -- 95 %       2/21 ED, Dr. Zheng / / Adolph Jr.    x Treatment/Medication  Losartan 100mg PO Daily   Coreg 25mg PO BID   Clonidine 0.1mg/24hr Patch applied weekly   Cardizem 240mg PO Daily   Hydralazine 100mg PO TID   Insulin Aspart low correction dose sliding scale AC/HS   Insulin Aspart 5 units TID w/meals   Insulin Aspart 3 units TID w/meals   Insulin Aspart 10 units TID w/meals   Insulin Detemir 18 units daily   Insulin Detemir 22 units daily   Insulin Detemir 17 units BID    Insulin Detemir 5 units BID   2/22 MAR   2/22-Present MAR   2/22-Present MAR   2/22-Present MAR   2/22-Present MAR   2/23-Present MAR   2/23-2/24 MAR   2/24-2/26 MAR   2/26-2/28 MAR   2/18 MAR   2/24 MAR   2/27 MAR   2/28-Present MAR    x Other  Latest ECHO performed and  demonstrates- Results for orders placed during the hospital encounter of 03/23/21   Interpretation Summary  · The left ventricle is normal in size with eccentric hypertrophy and low normal systolic function. The estimated ejection fraction is 50%  · There are segmental left ventricular wall motion abnormalities.  2/21 H&P, SHELBI Feldman     Provider, please clarify the relationship between the Hypertension and Diabetes Mellitus:    [   ]  Hypertension is a manifestation of Diabetes Mellitus (Secondary Hypertension)   [   ]   Hypertension is not a manifestation of Diabetes Mellitus (Essential Hypertension)   [   ]  Other Clarification (please specify): ____________   [ x ]  Clinically Undetermined       Please document in your progress notes daily for the duration of treatment, until resolved, and include in your discharge summary.

## 2022-03-01 VITALS
RESPIRATION RATE: 18 BRPM | BODY MASS INDEX: 25.2 KG/M2 | TEMPERATURE: 98 F | SYSTOLIC BLOOD PRESSURE: 169 MMHG | DIASTOLIC BLOOD PRESSURE: 69 MMHG | OXYGEN SATURATION: 93 % | WEIGHT: 142.19 LBS | HEIGHT: 63 IN | HEART RATE: 71 BPM

## 2022-03-01 LAB
ANION GAP SERPL CALC-SCNC: 13 MMOL/L (ref 8–16)
BUN SERPL-MCNC: 66 MG/DL (ref 8–23)
CALCIUM SERPL-MCNC: 8.2 MG/DL (ref 8.7–10.5)
CHLORIDE SERPL-SCNC: 104 MMOL/L (ref 95–110)
CO2 SERPL-SCNC: 18 MMOL/L (ref 23–29)
CREAT SERPL-MCNC: 2.2 MG/DL (ref 0.5–1.4)
EST. GFR  (AFRICAN AMERICAN): 23.4 ML/MIN/1.73 M^2
EST. GFR  (NON AFRICAN AMERICAN): 20.3 ML/MIN/1.73 M^2
GLUCOSE SERPL-MCNC: 273 MG/DL (ref 70–110)
POCT GLUCOSE: 240 MG/DL (ref 70–110)
POCT GLUCOSE: 356 MG/DL (ref 70–110)
POTASSIUM SERPL-SCNC: 4.2 MMOL/L (ref 3.5–5.1)
SODIUM SERPL-SCNC: 135 MMOL/L (ref 136–145)

## 2022-03-01 PROCEDURE — 25000003 PHARM REV CODE 250: Performed by: PHYSICIAN ASSISTANT

## 2022-03-01 PROCEDURE — 99239 HOSP IP/OBS DSCHRG MGMT >30: CPT | Mod: ,,, | Performed by: PHYSICIAN ASSISTANT

## 2022-03-01 PROCEDURE — 94640 AIRWAY INHALATION TREATMENT: CPT

## 2022-03-01 PROCEDURE — 99239 PR HOSPITAL DISCHARGE DAY,>30 MIN: ICD-10-PCS | Mod: ,,, | Performed by: PHYSICIAN ASSISTANT

## 2022-03-01 PROCEDURE — 27000221 HC OXYGEN, UP TO 24 HOURS

## 2022-03-01 PROCEDURE — 94660 CPAP INITIATION&MGMT: CPT

## 2022-03-01 PROCEDURE — 94799 UNLISTED PULMONARY SVC/PX: CPT

## 2022-03-01 PROCEDURE — 94668 MNPJ CHEST WALL SBSQ: CPT

## 2022-03-01 PROCEDURE — 63600175 PHARM REV CODE 636 W HCPCS: Performed by: NURSE PRACTITIONER

## 2022-03-01 PROCEDURE — 51702 INSERT TEMP BLADDER CATH: CPT

## 2022-03-01 PROCEDURE — 94760 N-INVAS EAR/PLS OXIMETRY 1: CPT

## 2022-03-01 PROCEDURE — 99900035 HC TECH TIME PER 15 MIN (STAT)

## 2022-03-01 PROCEDURE — 1111F PR DISCHARGE MEDS RECONCILED W/ CURRENT OUTPATIENT MED LIST: ICD-10-PCS | Mod: CPTII,,, | Performed by: PHYSICIAN ASSISTANT

## 2022-03-01 PROCEDURE — 25000003 PHARM REV CODE 250: Performed by: NURSE PRACTITIONER

## 2022-03-01 PROCEDURE — 80048 BASIC METABOLIC PNL TOTAL CA: CPT | Performed by: PHYSICIAN ASSISTANT

## 2022-03-01 PROCEDURE — 1111F DSCHRG MED/CURRENT MED MERGE: CPT | Mod: CPTII,,, | Performed by: PHYSICIAN ASSISTANT

## 2022-03-01 PROCEDURE — 36415 COLL VENOUS BLD VENIPUNCTURE: CPT | Performed by: PHYSICIAN ASSISTANT

## 2022-03-01 PROCEDURE — 27000646 HC AEROBIKA DEVICE

## 2022-03-01 PROCEDURE — 25000242 PHARM REV CODE 250 ALT 637 W/ HCPCS: Performed by: PHYSICIAN ASSISTANT

## 2022-03-01 PROCEDURE — 94761 N-INVAS EAR/PLS OXIMETRY MLT: CPT

## 2022-03-01 PROCEDURE — 94664 DEMO&/EVAL PT USE INHALER: CPT

## 2022-03-01 RX ORDER — SYRING-NEEDL,DISP,INSUL,0.3 ML 29 G X1/2"
296 SYRINGE, EMPTY DISPOSABLE MISCELLANEOUS
Status: DISCONTINUED | OUTPATIENT
Start: 2022-03-01 | End: 2022-03-01 | Stop reason: HOSPADM

## 2022-03-01 RX ORDER — BENZONATATE 100 MG/1
100 CAPSULE ORAL 3 TIMES DAILY PRN
Qty: 20 CAPSULE | Refills: 0 | Status: SHIPPED | OUTPATIENT
Start: 2022-03-01 | End: 2022-07-08 | Stop reason: SDUPTHER

## 2022-03-01 RX ORDER — POLYETHYLENE GLYCOL 3350 17 G/17G
17 POWDER, FOR SOLUTION ORAL DAILY PRN
Qty: 10 EACH | Refills: 1 | Status: SHIPPED | OUTPATIENT
Start: 2022-03-01 | End: 2022-03-01 | Stop reason: SDUPTHER

## 2022-03-01 RX ORDER — POLYETHYLENE GLYCOL 3350 17 G/17G
17 POWDER, FOR SOLUTION ORAL DAILY PRN
Qty: 10 EACH | Refills: 1 | Status: SHIPPED | OUTPATIENT
Start: 2022-03-01 | End: 2023-08-22

## 2022-03-01 RX ORDER — PSEUDOEPHEDRINE/ACETAMINOPHEN 30MG-500MG
100 TABLET ORAL
Status: DISCONTINUED | OUTPATIENT
Start: 2022-03-01 | End: 2022-03-01 | Stop reason: HOSPADM

## 2022-03-01 RX ORDER — POLYETHYLENE GLYCOL 3350 17 G/17G
17 POWDER, FOR SOLUTION ORAL DAILY
Status: DISCONTINUED | OUTPATIENT
Start: 2022-03-01 | End: 2022-03-01

## 2022-03-01 RX ORDER — ISOSORBIDE MONONITRATE 30 MG/1
30 TABLET, EXTENDED RELEASE ORAL DAILY
Qty: 30 TABLET | Refills: 2 | Status: SHIPPED | OUTPATIENT
Start: 2022-03-02 | End: 2022-03-21 | Stop reason: SINTOL

## 2022-03-01 RX ORDER — ISOSORBIDE MONONITRATE 30 MG/1
30 TABLET, EXTENDED RELEASE ORAL DAILY
Qty: 30 TABLET | Refills: 2 | Status: SHIPPED | OUTPATIENT
Start: 2022-03-02 | End: 2022-03-01 | Stop reason: SDUPTHER

## 2022-03-01 RX ORDER — BENZONATATE 100 MG/1
100 CAPSULE ORAL 3 TIMES DAILY PRN
Qty: 20 CAPSULE | Refills: 0 | Status: SHIPPED | OUTPATIENT
Start: 2022-03-01 | End: 2022-03-01 | Stop reason: SDUPTHER

## 2022-03-01 RX ORDER — BISACODYL 10 MG
10 SUPPOSITORY, RECTAL RECTAL DAILY PRN
Status: DISCONTINUED | OUTPATIENT
Start: 2022-03-01 | End: 2022-03-01 | Stop reason: HOSPADM

## 2022-03-01 RX ADMIN — FLUTICASONE FUROATE AND VILANTEROL TRIFENATATE 1 PUFF: 100; 25 POWDER RESPIRATORY (INHALATION) at 07:03

## 2022-03-01 RX ADMIN — HYDRALAZINE HYDROCHLORIDE 100 MG: 25 TABLET, FILM COATED ORAL at 08:03

## 2022-03-01 RX ADMIN — BISACODYL 10 MG: 10 SUPPOSITORY RECTAL at 01:03

## 2022-03-01 RX ADMIN — IPRATROPIUM BROMIDE AND ALBUTEROL SULFATE 3 ML: 2.5; .5 SOLUTION RESPIRATORY (INHALATION) at 07:03

## 2022-03-01 RX ADMIN — INSULIN ASPART 5 UNITS: 100 INJECTION, SOLUTION INTRAVENOUS; SUBCUTANEOUS at 12:03

## 2022-03-01 RX ADMIN — ACETAMINOPHEN 650 MG: 325 TABLET ORAL at 01:03

## 2022-03-01 RX ADMIN — CETIRIZINE HYDROCHLORIDE 10 MG: 5 TABLET ORAL at 08:03

## 2022-03-01 RX ADMIN — IPRATROPIUM BROMIDE AND ALBUTEROL SULFATE 3 ML: 2.5; .5 SOLUTION RESPIRATORY (INHALATION) at 01:03

## 2022-03-01 RX ADMIN — TIOTROPIUM BROMIDE INHALATION SPRAY 2 PUFF: 3.12 SPRAY, METERED RESPIRATORY (INHALATION) at 07:03

## 2022-03-01 RX ADMIN — HEPARIN SODIUM 5000 UNITS: 5000 INJECTION INTRAVENOUS; SUBCUTANEOUS at 05:03

## 2022-03-01 RX ADMIN — HYDRALAZINE HYDROCHLORIDE 100 MG: 25 TABLET, FILM COATED ORAL at 03:03

## 2022-03-01 RX ADMIN — Medication 250 MG: at 08:03

## 2022-03-01 RX ADMIN — FLUTICASONE PROPIONATE 100 MCG: 50 SPRAY, METERED NASAL at 08:03

## 2022-03-01 RX ADMIN — GUAIFENESIN AND DEXTROMETHORPHAN HYDROBROMIDE 1 TABLET: 600; 30 TABLET, EXTENDED RELEASE ORAL at 08:03

## 2022-03-01 RX ADMIN — INSULIN ASPART 2 UNITS: 100 INJECTION, SOLUTION INTRAVENOUS; SUBCUTANEOUS at 08:03

## 2022-03-01 RX ADMIN — PRAVASTATIN SODIUM 40 MG: 40 TABLET ORAL at 08:03

## 2022-03-01 RX ADMIN — POLYETHYLENE GLYCOL 3350 17 G: 17 POWDER, FOR SOLUTION ORAL at 10:03

## 2022-03-01 RX ADMIN — ISOSORBIDE MONONITRATE 30 MG: 30 TABLET, EXTENDED RELEASE ORAL at 08:03

## 2022-03-01 RX ADMIN — CHOLECALCIFEROL TAB 25 MCG (1000 UNIT) 2000 UNITS: 25 TAB at 08:03

## 2022-03-01 RX ADMIN — DILTIAZEM HYDROCHLORIDE 240 MG: 120 CAPSULE, COATED, EXTENDED RELEASE ORAL at 08:03

## 2022-03-01 RX ADMIN — CARVEDILOL 25 MG: 25 TABLET, FILM COATED ORAL at 08:03

## 2022-03-01 NOTE — NURSING
Road Test  Oxygen-Patient tolerating room air, no distress.  Ambulation-Patient up with no assistance.  Devices-Patient going home with no devices  Tolerating-Regular diet.  Elimination-Patient voiding via lauren catheter  Self Care-Performs self care without assistance.  Teaching-Verbal and written discharge teaching given.     Patient tolerates room air, ambulates with no assistance, regular diet, voiding without difficulty, and is going home with lauren catheter. Peripheral IV removed, catheter intact, dressed with dry gauze and coban. No bleeding present. Patient going home. Discharge paperwork discussed. Medications reviewed. Patient has all belongings and has no questions at this time.

## 2022-03-01 NOTE — PLAN OF CARE
No updated in CP about hh referral, attempted to call , number is not in service for the [facility]. Julio placed call to [facility] at , left   For Corinna in admissions to return Julio's call.

## 2022-03-01 NOTE — PLAN OF CARE
"Sw updated sister Betsy that [facility] is not returning or answering Sw's call, Sw informed it may be Thursday before pt is seen if Sw speak with HH agency tomorrow. Betsy states "that is fine, we have not even left yet," Sw will follow and update family.   "

## 2022-03-01 NOTE — PLAN OF CARE
Patient AAOx4. No complaints this shift. Difficulty urinating; started on oxybutynin. Straight cath x1 -1L. Maintaing O2 sats on RA. Telemetry in place. IV rocephin continued. Accu-checks ACHS. Ambulates independently. Bed in low locked position, call light and personal items within reach. Instructed to call if needed.

## 2022-03-01 NOTE — PLAN OF CARE
Nephrology POC Note:    -Renal function appears to be improving and almost back to baseline   -Advise follow-up with her Primary Nephrologist, Dr. Mccoy, in clinic after d/c    Nephrology will sign off. Please contact team with any questions/concerns.

## 2022-03-01 NOTE — NURSING
Spoke with Ana Jimenez in regards to patient bladder scan showing greater than 999, she ordered for the patient to get an indwelling lauren. Patient put out 1300ml of urine upon insertion.

## 2022-03-01 NOTE — PLAN OF CARE
Julio sent home health orders to [facility] for dc today. Spoke with [facility] Betsy, informed they want to use [facility], also asked about catheter supplies, Sw informed [facility] to ask nurse and physician about supplies as  does not provide catheter supplies. New HH referral made and nurse updated on information.

## 2022-03-01 NOTE — PLAN OF CARE
[facility] - Oncology (Hospital)      HOME HEALTH ORDERS  FACE TO FACE ENCOUNTER    Patient Name: Myesha Quinones  YOB: 1940    PCP: Catrachita Rothman MD   PCP Address: [address] [address]  PCP Phone Number: [phone]  PCP Fax: [phone]    Encounter Date: 2/21/22    Admit to Home Health    Diagnoses:  Active Hospital Problems    Diagnosis  POA    *Acute renal failure superimposed on stage 4 chronic kidney disease [N17.9, N18.4]  Yes     Priority: 1 - High    Chronic obstructive pulmonary disease with acute exacerbation [J44.1]  Yes     Priority: 2     Acute on chronic combined systolic and diastolic heart failure [I50.43]  Yes     Priority: 3     Type 2 diabetes mellitus with hyperglycemia, with long-term current use of insulin [E11.65, Z79.4]  Not Applicable     Priority: 4     Hyperlipidemia associated with type 2 diabetes mellitus [E11.69, E78.5]  Yes     Priority: 6     Iron deficiency anemia [D50.9]  Yes     Priority: 7     Hypertension associated with diabetes [E11.59, I15.2]  Yes     Priority: 8     Obstructive sleep apnea [G47.33]  Yes     Priority: 9     Urinary retention [R33.9]  No    Acute hyponatremia [E87.1]  Yes    Metabolic acidosis [E87.2]  Yes    CKD (chronic kidney disease) stage 4, GFR 15-29 ml/min [N18.4]  Yes    Acute respiratory failure with hypoxia and hypercarbia [J96.01, J96.02]  Yes    Proteinuria [R80.9]  Yes    Biventricular ICD (implantable cardioverter-defibrillator) in place [Z95.810]  Yes    Asthma, chronic [J45.909]  Yes      Resolved Hospital Problems    Diagnosis Date Resolved POA    Nonischemic cardiomyopathy [I42.8] 02/22/2022 Yes     EF normalized after CRT         Follow Up Appointments:  Future Appointments   Date Time Provider Department Center   3/2/2022  1:30 PM LAB, APPOINTMENT [facility] LAB VNP [facility]   3/2/2022  2:00 PM Li Lott PA-C [facility] HRTFT [facility]   3/2/2022  2:00 PM [facility] HEART  FAILURE NURSE [facility] HRTFTC [facility]   3/3/2022 10:00 AM Kelvin Maki MD [facility] PULMSVC [facility]   4/7/2022 10:30 AM LAB, JANES LARA LAB [place]   4/7/2022 10:45 [facility] [place]   4/14/2022 10:30 AM Mary Jane Mccoy MD [facility] NEPHRO [facility]   4/26/2022 10:30 AM Catrachita Rothman MD [facility] IM [facility] PCW   5/8/2022 10:00 AM HOME MONITOR DEVICE CHECK, [facility] ARRRO [facility]   5/11/2022  8:40 AM COORDINATED DEVICE CHECK [facility]   5/11/2022  9:15 AM EKG, APPT [facility] EKG [facility]   5/11/2022  9:40 AM Jan Mckeon MD [facility] ARRHYTH [facility]   6/6/2022  9:00 AM Elidia Madison NP [facility]       Allergies:  Review of patient's allergies indicates:   Allergen Reactions    Iodine and iodide containing products Hives    Nifedipine      weakness       Medications: Review discharge medications with patient and family and provide education.    Current Facility-Administered Medications   Medication Dose Route Frequency Provider Last Rate Last Admin    acetaminophen tablet 650 mg  650 mg Oral Q6H PRN Magi Feldman NP   650 mg at 03/01/22 0147    albuterol-ipratropium 2.5 mg-0.5 mg/3 mL nebulizer solution 3 mL  3 mL Nebulization Q4H PRN Magi Feldman NP        albuterol-ipratropium 2.5 mg-0.5 mg/3 mL nebulizer solution 3 mL  3 mL Nebulization Q6H [place] Latoya Luna PA-C   3 mL at 03/01/22 0730    ascorbic acid (vitamin C) tablet 250 mg  250 mg Oral Daily Magi Feldman NP   250 mg at 03/01/22 0814    benzonatate capsule 100 mg  100 mg Oral TID PRN Latoya Luna PA-C        carvediloL tablet 25 mg  25 mg Oral BID Magi Feldman NP   25 mg at 03/01/22 0814    cefTRIAXone (ROCEPHIN) 1 g/50 mL D5W IVPB  1 g Intravenous Q24H Jolynn Kemp PA-C   Stopped at 02/28/22 1259    cetirizine tablet 10 mg  10 mg Oral Daily Magi Feldman NP   10 mg at 03/01/22 0814    cloNIDine 0.1 mg/24 hr td ptwk 1 patch  1 patch Transdermal Every Tues Magi  GENA Feldman NP   1 patch at 02/22/22 2058    dextromethorphan-guaiFENesin  mg per 12 hr tablet 1 tablet  1 tablet Oral BID Latoya Luna PA-C   1 tablet at 03/01/22 0814    dextrose 10% bolus 125 mL  12.5 g Intravenous PRN Jose Manuel Correia MD        dextrose 10% bolus 250 mL  25 g Intravenous PRN Jose Manuel Correia MD        diltiaZEM 24 hr capsule 240 mg  240 mg Oral Daily Magi Feldman NP   240 mg at 03/01/22 0814    fluticasone furoate-vilanteroL 100-25 mcg/dose diskus inhaler 1 puff  1 puff Inhalation Daily Magi Feldman NP   1 puff at 03/01/22 0733    fluticasone propionate 50 mcg/actuation nasal spray 100 mcg  2 spray Each Nostril Daily Magi Feldman NP   100 mcg at 03/01/22 0817    glucagon (human recombinant) injection 1 mg  1 mg Intramuscular PRN Magi Feldman NP        glucose chewable tablet 16 g  16 g Oral PRN Magi Feldman NP   16 g at 02/27/22 1633    glucose chewable tablet 24 g  24 g Oral PRN Magi Feldman NP        heparin (porcine) injection 5,000 Units  5,000 Units Subcutaneous Q8H Magi Feldman NP   5,000 Units at 03/01/22 0500    hydrALAZINE tablet 100 mg  100 mg Oral TID Magi Feldman NP   100 mg at 03/01/22 0812    insulin aspart U-100 pen 0-5 Units  0-5 Units Subcutaneous QID (AC + HS) PRN Latoya Luna PA-C   2 Units at 03/01/22 0820    insulin detemir U-100 pen 5 Units  5 Units Subcutaneous BID Jolynn Kemp PA-C   5 Units at 03/01/22 0817    isosorbide mononitrate 24 hr tablet 30 mg  30 mg Oral Daily Jolynn Kemp PA-C   30 mg at 03/01/22 0814    montelukast tablet 10 mg  10 mg Oral QHS Magi Feldman NP   10 mg at 02/28/22 2028    nitroGLYCERIN SL tablet 0.4 mg  0.4 mg Sublingual Q5 Min PRN Magi Feldman NP        polyethylene glycol packet 17 g  17 g Oral Daily Jolynn Kemp PA-C        pravastatin tablet 40 mg  40 mg Oral Daily Magi Feldman NP   40 mg at 03/01/22 0814    sodium  chloride 0.9% flush 10 mL  10 mL Intravenous PRN Magi Feldman, SHELBI        tiotropium bromide 2.5 mcg/actuation inhaler 2 puff  2 puff Inhalation Daily Magi Feldman NP   2 puff at 03/01/22 0733    vitamin D 1000 units tablet 2,000 Units  2,000 Units Oral Daily Magi Feldman NP   2,000 Units at 03/01/22 0814     Current Discharge Medication List      START taking these medications    Details   benzonatate (TESSALON) 100 MG capsule Take 1 capsule (100 mg total) by mouth 3 (three) times daily as needed for Cough.  Qty: 20 capsule, Refills: 0      isosorbide mononitrate (IMDUR) 30 MG 24 hr tablet Take 1 tablet (30 mg total) by mouth once daily.  Qty: 30 tablet, Refills: 2      polyethylene glycol (GLYCOLAX) 17 gram PwPk Take 17 g by mouth daily as needed (constipation).  Qty: 10 each, Refills: 1         CONTINUE these medications which have NOT CHANGED    Details   acetaminophen (TYLENOL) 500 MG tablet Take 1,000 mg by mouth daily as needed for Pain.      albuterol (PROVENTIL/VENTOLIN HFA) 90 mcg/actuation inhaler INHALE 2 PUFFS INTO THE LUNGS EVERY 6 (SIX) HOURS AS NEEDED FOR WHEEZING. RESCUE  Qty: 18 g, Refills: 12    Associated Diagnoses: Cough      albuterol-ipratropium (DUO-NEB) 2.5 mg-0.5 mg/3 mL nebulizer solution TAKE 3 MLS BY NEBULIZATION EVERY 4 (FOUR) HOURS AS NEEDED FOR WHEEZING.  Qty: 270 mL, Refills: 12      ascorbic acid, vitamin C, (VITAMIN C) 100 MG tablet Take 100 mg by mouth once daily.      blood sugar diagnostic (ACCU-CHEK ANGELICA) Strp Uses Accu-Check Angelica meter to test BG 4x/day  Qty: 400 strip, Refills: 6    Associated Diagnoses: Diabetic polyneuropathy associated with type 2 diabetes mellitus      carvediloL (COREG) 25 MG tablet TAKE 2 TABLETS (50 MG TOTAL) BY MOUTH 2 (TWO) TIMES DAILY.  Qty: 360 tablet, Refills: 3      cholecalciferol, vitamin D3, (VITAMIN D3) 50 mcg (2,000 unit) Tab Take 1 tablet by mouth once daily.       cloNIDine 0.1 mg/24 hr td ptwk (CATAPRES) 0.1 mg/24 hr  Place 1 patch onto the skin every Tuesday.  Qty: 12 patch, Refills: 11    Comments: DX Code Needed  .  Associated Diagnoses: Essential hypertension      CYANOCOBALAMIN, VITAMIN B-12, (B-12 DOTS ORAL) Take 1 tablet by mouth once daily.      diltiaZEM (CARDIZEM CD) 240 MG 24 hr capsule Take 1 capsule (240 mg total) by mouth once daily.  Qty: 30 capsule, Refills: 11    Comments: .      FLUAD QUAD 2021-22,65Y UP,,PF, 60 mcg (15 mcg x 4)/0.5 mL Syrg       fluticasone propionate (FLONASE) 50 mcg/actuation nasal spray 2 sprays (100 mcg total) by Each Nostril route once daily.  Qty: 16 g, Refills: 12      fluticasone-salmeterol diskus inhaler 250-50 mcg Inhale 1 puff into the lungs 2 (two) times daily. Controller  Qty: 180 each, Refills: 3      furosemide (LASIX) 20 MG tablet Take 1 tablet (20 mg total) by mouth once daily.  Qty: 90 tablet, Refills: 2    Associated Diagnoses: Cardiomyopathy, ischemic; Chronic combined systolic (congestive) and diastolic (congestive) heart failure      hydrALAZINE (APRESOLINE) 100 MG tablet TAKE 1 TABLET BY MOUTH THREE TIMES A DAY  Qty: 270 tablet, Refills: 3    Associated Diagnoses: Essential hypertension      insulin (LANTUS SOLOSTAR U-100 INSULIN) glargine 100 units/mL (3mL) SubQ pen Inject 22 Units into the skin once daily. Increase per providers instruction. Max TDD 40units.  Qty: 5 each, Refills: 6    Associated Diagnoses: Uncontrolled type 2 diabetes mellitus with hyperglycemia      irbesartan (AVAPRO) 300 MG tablet Take 1 tablet (300 mg total) by mouth every evening.  Qty: 90 tablet, Refills: 3    Comments: .  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  Qty: 30 tablet, Refills: 11      magnesium oxide (MAG-OX) 400 mg tablet Take 1 tablet (400 mg total) by mouth once daily.  Refills: 0      montelukast (SINGULAIR) 10 mg tablet Take 1 tablet (10 mg total) by mouth every evening.  Qty: 90 tablet, Refills: 3    Associated Diagnoses: Mild intermittent chronic asthma without complication      nitroGLYCERIN (NITROSTAT) 0.4 MG SL tablet Place 0.4 mg under the tongue every 5 (five) minutes as needed for Chest pain.       omega-3 fatty acids 500 mg Cap Take 1 capsule by mouth Twice daily.      pen needle, diabetic (BD ULTRA-FINE MINI PEN NEEDLE) 31 gauge x 3/16" Ndle USE WITH LANTUS AT BEDTIME  Qty: 400 each, Refills: 3    Associated Diagnoses: Diabetic polyneuropathy associated with type 2 diabetes mellitus      pravastatin (PRAVACHOL) 40 MG tablet Take 1 tablet (40 mg total) by mouth once daily.  Qty: 90 tablet, Refills: 3    Associated Diagnoses: Mixed hyperlipidemia      pulse oximeter (PULSE OXIMETER) device by Apply Externally route 2 (two) times a day. Use twice daily at 8 AM and 3 PM and record the value in MyChart as directed.  Qty: 1 each, Refills: 0    Comments: This is a NO CHARGE item.  Please override price to zero.  DO NOT PRINT.  NORMAL MODE e-PRESCRIBE ONLY.      tiotropium (SPIRIVA) 18 mcg inhalation capsule Inhale 1 capsule (18 mcg total) into the lungs once daily. Controller  Qty: 90 capsule, Refills: 3    Associated Diagnoses: SOB (shortness of breath); Mild intermittent chronic asthma without complication      VENOFER 100 mg iron/5 mL injection                I have seen and examined this patient within the last 30 days. My clinical findings that support the need for the home health skilled services and home bound status are the following:no   Weakness/numbness causing balance and gait disturbance due to " COPD Exacerbation and Weakness/Debility making it taxing to leave home.     Diet:   cardiac diet, diabetic diet 2000 calorie and fluid restrictionm 1500mL    Labs:  SN to perform labs:  BMP: Weekly; 1 week(s) and Report Lab results to PCP.    Referrals/ Consults  Physical Therapy to evaluate and treat. Evaluate for home safety and equipment needs; Establish/upgrade home exercise program. Perform / instruct on therapeutic exercises, gait training, transfer training, and Range of Motion.  Occupational Therapy to evaluate and treat. Evaluate home environment for safety and equipment needs. Perform/Instruct on transfers, ADL training, ROM, and therapeutic exercises.    Activities:   activity as tolerated    Nursing:   Agency to admit patient within 24 hours of hospital discharge unless specified on physician order or at patient request    SN to complete comprehensive assessment including routine vital signs. Instruct on disease process and s/s of complications to report to MD. Review/verify medication list sent home with the patient at time of discharge  and instruct patient/caregiver as needed. Frequency may be adjusted depending on start of care date.     Skilled nurse to perform up to 3 visits PRN for symptoms related to diagnosis    Notify MD if SBP > 160 or < 90; DBP > 90 or < 50; HR > 120 or < 50; Temp > 101; O2 < 88%    Ok to schedule additional visits based on staff availability and patient request on consecutive days within the home health episode.    When multiple disciplines ordered:    Start of Care occurs on Sunday - Wednesday schedule remaining discipline evaluations as ordered on separate consecutive days following the start of care.    Thursday SOC -schedule subsequent evaluations Friday and Monday the following week.     Friday - Saturday SOC - schedule subsequent discipline evaluations on consecutive days starting Monday of the following week.    Miscellaneous   De La Cruz Care: Instruct patient/caregiver  to empty De La Cruz bag.  Change De [place] Cruz every month.  Diabetic Care:   Report CBG < 60 or > 350 to physician.    Home Health Aide:  Nursing Weekly, Physical Therapy Three times weekly and Occupational Therapy Three times weekly    Wound Care Orders  no    I certify that this patient is confined to her home and needs intermittent skilled nursing care, physical therapy and occupational therapy.

## 2022-03-02 ENCOUNTER — PATIENT OUTREACH (OUTPATIENT)
Dept: ADMINISTRATIVE | Facility: CLINIC | Age: 82
End: 2022-03-02
Payer: MEDICARE

## 2022-03-02 ENCOUNTER — TELEPHONE (OUTPATIENT)
Dept: PULMONOLOGY | Facility: CLINIC | Age: 82
End: 2022-03-02
Payer: MEDICARE

## 2022-03-02 LAB
MISCELLANEOUS TEST NAME: NORMAL
REFERENCE LAB: NORMAL
SPECIMEN TYPE: NORMAL
TEST RESULT: NORMAL

## 2022-03-02 NOTE — PLAN OF CARE
Pt was informed by Marilu with meals for mom that 1st available delivery to Pt's home is Wed 3/9/22. Pt will receive 2 meals for 7 days.

## 2022-03-02 NOTE — TELEPHONE ENCOUNTER
----- Message from Kelvin Maki MD sent at 3/2/2022 11:26 AM CST -----  Contact: tania  Fine with me,    Kelvin Davis   ----- Message -----  From: Marilu Jeffrey MA  Sent: 3/2/2022   9:39 AM CST  To: MD Dr Glynn Green: Pt scheduled to see you tomorrow for 10am. Is it ok to change to virtual?  Thanks, Marilu   ----- Message -----  From: Vanessa Epperson  Sent: 3/2/2022   9:18 AM CST  To: Glynn Warren Staff    Pt was just released from hospital yesterday and would like to know if appt tmrw could be made virtual     Call back Betsy @[phone]

## 2022-03-02 NOTE — PLAN OF CARE
[phone], Julio placed call to [employer], informed of meals for mom at [place] per sister Betsy's request. Receives 2 meals per day for 7 days, Pt is active. Julio placed , left VM message for [facility] rep to call Julio and inform that Pt is dc'd and resume her meals for mom at home.

## 2022-03-02 NOTE — PT/OT/SLP DISCHARGE
Occupational Therapy Discharge Summary    Myesha Quinones  MRN: [id number]   Principal Problem: Acute renal failure superimposed on stage 4 chronic kidney disease      Patient Discharged from acute Occupational Therapy on 03-03-22.  Please refer to prior OT note dated 02-25-22 for functional status.    Assessment:      Patient appropriate for care in another setting.    Objective:     GOALS:   Multidisciplinary Problems     Occupational Therapy Goals        Problem: Occupational Therapy Goal    Goal Priority Disciplines Outcome Interventions   Occupational Therapy Goal     OT, PT/OT Ongoing, Progressing    Description: Goals to be met by: 3/8/2022     Patient will increase functional independence with ADLs by performing:    UE Dressing with [place].  LE Dressing with Modified [place].  Grooming while standing at sink with Modified [place].  Toileting from toilet with Modified [place] for hygiene and clothing management.   Supine to sit with Modified [place].  Sit to stand transfer with Modified [place] with or without AD.  Toilet transfer to toilet with Modified [place] with or without AD.                     Reasons for Discontinuation of Therapy Services  Transfer to alternate level of care.      Plan:     Patient Discharged to: Home with Home Health Service    3/2/2022

## 2022-03-02 NOTE — TELEPHONE ENCOUNTER
I spoke to patient's daughter, Betsy to let her know appointment scheduled tomorrow with Dr Maki for 10am has been switched to virtual visit. Betsy confirmed and verbalized understanding.

## 2022-03-03 ENCOUNTER — TELEPHONE (OUTPATIENT)
Dept: CARDIOLOGY | Facility: CLINIC | Age: 82
End: 2022-03-03
Payer: MEDICARE

## 2022-03-03 ENCOUNTER — OFFICE VISIT (OUTPATIENT)
Dept: PULMONOLOGY | Facility: CLINIC | Age: 82
End: 2022-03-03
Payer: MEDICARE

## 2022-03-03 ENCOUNTER — PATIENT OUTREACH (OUTPATIENT)
Dept: ADMINISTRATIVE | Facility: OTHER | Age: 82
End: 2022-03-03
Payer: MEDICARE

## 2022-03-03 DIAGNOSIS — J30.2 SEASONAL ALLERGIES: ICD-10-CM

## 2022-03-03 DIAGNOSIS — I50.43 ACUTE ON CHRONIC COMBINED SYSTOLIC AND DIASTOLIC HEART FAILURE: ICD-10-CM

## 2022-03-03 DIAGNOSIS — E66.3 OVERWEIGHT (BMI 25.0-29.9): ICD-10-CM

## 2022-03-03 DIAGNOSIS — R91.8 MULTIPLE LUNG NODULES: ICD-10-CM

## 2022-03-03 DIAGNOSIS — J42 CHRONIC BRONCHITIS, UNSPECIFIED CHRONIC BRONCHITIS TYPE: ICD-10-CM

## 2022-03-03 DIAGNOSIS — R05.9 COUGH: ICD-10-CM

## 2022-03-03 DIAGNOSIS — J45.50 SEVERE PERSISTENT CHRONIC ASTHMA WITHOUT COMPLICATION: Primary | ICD-10-CM

## 2022-03-03 PROCEDURE — G0180 MD CERTIFICATION HHA PATIENT: HCPCS | Mod: ,,, | Performed by: INTERNAL MEDICINE

## 2022-03-03 PROCEDURE — 99213 OFFICE O/P EST LOW 20 MIN: CPT | Mod: 95,,, | Performed by: INTERNAL MEDICINE

## 2022-03-03 PROCEDURE — 99213 PR OFFICE/OUTPT VISIT, EST, LEVL III, 20-29 MIN: ICD-10-PCS | Mod: 95,,, | Performed by: INTERNAL MEDICINE

## 2022-03-03 PROCEDURE — G0180 PR HOME HEALTH MD CERTIFICATION: ICD-10-PCS | Mod: ,,, | Performed by: INTERNAL MEDICINE

## 2022-03-03 RX ORDER — SODIUM CHLORIDE FOR INHALATION 3 %
4 VIAL, NEBULIZER (ML) INHALATION 4 TIMES DAILY PRN
Qty: 15 ML | Refills: 11 | Status: SHIPPED | OUTPATIENT
Start: 2022-03-03 | End: 2022-03-07

## 2022-03-03 NOTE — TELEPHONE ENCOUNTER
"[facility]) hospital discharge 48-72 hour phone follow up completed.     Most Recent Hospital Discharge Date: 3/1/2  Last admission Diagnosis/chief complaint:SOB    TCC RN Navigator spoke with pt daughter.      Current Patient reported weight: "unsure"    Current Patient reported blood pressure and heart rate: "unsure"     Pt reports the following:  []  Shortness of Breath with Activity - some SOb, following with pulmonary   []  Shortness of Breath at rest   []  Fatigue  []  Edema   [] Chest pain or tightness  [] Weight Increase since discharge  [x] None of the above    Medications:    Discharge medication reviewed with pt.  Pt reports having medication list available and has all medications at home for use per list.     Education:   Confirmed pt received "Home Care Guide for Heart Failure Patients" while admitted.   Reviewed key points as listed below.      Recommend 2 -3 gram sodium restriction and 1500 cc-2000 cc fluid restriction.   Encourage physical activity with graded exercise program.   Requested patient to weigh themselves daily, and to notify us if their weight increases by more than 3 lbs in 1 day or 5 lbs in 3 days.   o Reminded patient to use "Daily weight and symptom tracker" to record and guide patient on when and how to call [facility]. PT may also use symptom tracker if no scale available  o Pt reports being in the GREEN (color) Zone. If in yellow/red, reminded that they should be calling [facility] today or now.     Watch for these Signs and Symptoms: If any of these occur, contact [facility] immediately:   Increase in shortness of breath with movement   Increase in swelling in your legs and ankles   Weight gain of more than 3 pounds in a day or 5 pounds in 3 days.   Difficulty breathing when you are lying down   Worsening fatigue or tiredness   Stomach bloating, a full feeling or a loss of appetite   Increased coughing--especially when you are lying down      Pt " was able to verbalize back to RN in their own words correct diet/fluid restrictions, necessity for exercise, warning signs and symptoms, when and how to contact their TCC team.      Pt educated on follow-up plan while in [facility] program to include:   Week 1 -  F/u appt with Provider and RN (Date)    Week 2-5 - In person/ Virtual/ phone call check ins    Week 5-7 - Pt will discharge from [facility] and transition to longterm care provider (Cardiology/PCP/ Advanced Heart Failure).      Patient active on myChart? yes      Pt given the following contact information for ease of communication: [phone] (Mon-Fri, 8a-5p) & for urgent issues on the weekend to page the Heart Transplant MD on call.  Pt also encouraged utilize [facility] messaging as well.      Will follow up with pt at first clinic visit and RN navigator available for pt questions, issues or concerns.

## 2022-03-03 NOTE — PROGRESS NOTES
Subjective:      The patient location is: home in [place]  The chief complaint leading to consultation is: persistent asthma, cough, abnormal CT    Visit type: audiovisual    Face to Face time with patient: 20  45 minutes of total time spent on the encounter, which includes face to face time and non-face to face time preparing to see the patient (eg, review of tests), Obtaining and/or reviewing separately obtained history, Documenting clinical information in the electronic or other health record, Independently interpreting results (not separately reported) and communicating results to the patient/family/caregiver, or Care coordination (not separately reported).     Each patient to whom he or she provides medical services by telemedicine is:  (1) informed of the relationship between the physician and patient and the respective role of any other health care provider with respect to management of the patient; and (2) notified that he or she may decline to receive medical services by telemedicine and may withdraw from such care at any time.    Please see visit note below:     Patient ID: Myesha Quinones is a 81 y.o. female.    Chief Complaint: No chief complaint on file.    HPI   Myesha Quinones has a past medical history of CAD, combined systolic and diastolic heart failure, LBBB, biventricular ICD, CKD4, IDDM, breast cancer, HTN, HLD, severe persistent asthma, HLD, who is here by virtual visit for continued shortness of breath, asthmatic symptoms, congestion and abnormal CT thorax.  Currently taking Spiriva, Wixela (250-50), Albuterol, Duonebs, Singulair, Flonase.      Just finished a course of steroids a couple days ago. Just finished doxycycline that was prescribed with steroids.  Despite multiple treatments with antibiotics she has experienced no relief (doxycycline and augmentin).  She has a history of pseudomonas in the sputum in the past.  Currently no sputum production, no fever, no chills, no night sweats.   She doesn't cough up casts ever.  She is very tired of her persistent symptoms.  She does report she improves markedly while on steroids.  She has never tried a biologic.    Review of Systems   Constitutional: Positive for activity change and fatigue. Negative for fever, chills and night sweats.   HENT: Positive for congestion and hearing loss. Negative for postnasal drip and rhinorrhea.    Eyes: Negative.    Respiratory: Positive for cough, chest tightness, shortness of breath, wheezing, dyspnea on extertion and use of rescue inhaler. Negative for sputum production.    Cardiovascular: Positive for chest pain. Negative for palpitations and leg swelling.   Genitourinary: Negative.    Endocrine: endocrine negative   Musculoskeletal: Negative.    Gastrointestinal: Negative for nausea, vomiting, abdominal pain and abdominal distention.   Neurological: Negative.    Psychiatric/Behavioral: Negative for confusion and sleep disturbance. The patient is not nervous/anxious.        Objective:       This was a virtual visit, Full exam deferred due to remote interview.    Physical Exam   Constitutional: She is oriented to person, place, and time. She appears well-developed and well-nourished. She is obese.   HENT:   Head: Normocephalic.   Neurological: She is alert and oriented to person, place, and time.   Psychiatric: She has a normal mood and affect. Her behavior is normal. Judgment and thought content normal.     Personal Diagnostic Review  CT thorax 2/2022  Mucous plugging vs aspiration involving the RML and RLL with volume loss of both areas.  Enlarged precarinal and sub carinal lymph nodes.  RUL pleural based nodule.  Bilateral small pleural effusions. Adrenal adenoma      No flowsheet data found.      Assessment:       1. Severe persistent chronic asthma without complication    2. Cough    3. Multiple lung nodules    4. Chronic bronchitis, unspecified chronic bronchitis type    5. Acute on chronic combined systolic and  diastolic heart failure    6. Overweight (BMI 25.0-29.9)    7. Seasonal allergies        Outpatient Encounter Medications as of 3/3/2022   Medication Sig Dispense Refill    acetaminophen (TYLENOL) 500 MG tablet Take 1,000 mg by mouth daily as needed for Pain.      albuterol (PROVENTIL/VENTOLIN HFA) 90 mcg/actuation inhaler INHALE 2 PUFFS INTO THE LUNGS EVERY 6 (SIX) HOURS AS NEEDED FOR WHEEZING. RESCUE 18 g 12    albuterol-ipratropium (DUO-NEB) 2.5 mg-0.5 mg/3 mL nebulizer solution TAKE 3 MLS BY NEBULIZATION EVERY 4 (FOUR) HOURS AS NEEDED FOR WHEEZING. 270 mL 12    ascorbic acid, vitamin C, (VITAMIN C) 100 MG tablet Take 100 mg by mouth once daily.      benzonatate (TESSALON) 100 MG capsule Take 1 capsule (100 mg total) by mouth 3 (three) times daily as needed for Cough. 20 capsule 0    blood sugar diagnostic (ACCU-CHEK HECTOR) Strp Uses Accu-Check Hector meter to test BG 4x/day 400 strip 6    carvediloL (COREG) 25 MG tablet TAKE 2 TABLETS (50 MG TOTAL) BY MOUTH 2 (TWO) TIMES DAILY. 360 tablet 3    cholecalciferol, vitamin D3, (VITAMIN D3) 50 mcg (2,000 unit) Tab Take 1 tablet by mouth once daily.       cloNIDine 0.1 mg/24 hr td ptwk (CATAPRES) 0.1 mg/24 hr Place 1 patch onto the skin every Tuesday. 12 patch 11    CYANOCOBALAMIN, VITAMIN B-12, (B-12 DOTS ORAL) Take 1 tablet by mouth once daily.      diltiaZEM (CARDIZEM CD) 240 MG 24 hr capsule Take 1 capsule (240 mg total) by mouth once daily. 30 capsule 11    FLUAD QUAD 2021-22,65Y UP,,PF, 60 mcg (15 mcg x 4)/0.5 mL Syrg       fluticasone propionate (FLONASE) 50 mcg/actuation nasal spray 2 sprays (100 mcg total) by Each Nostril route once daily. 16 g 12    fluticasone-salmeterol diskus inhaler 250-50 mcg Inhale 1 puff into the lungs 2 (two) times daily. Controller 180 each 3    furosemide (LASIX) 20 MG tablet Take 1 tablet (20 mg total) by mouth once daily. 90 tablet 2    hydrALAZINE (APRESOLINE) 100 MG tablet TAKE 1 TABLET BY MOUTH THREE TIMES A DAY  "270 tablet 3    insulin (LANTUS SOLOSTAR U-100 INSULIN) glargine 100 units/mL (3mL) SubQ pen Inject 22 Units into the skin once daily. Increase per providers instruction. Max TDD 40units. 5 each 6    irbesartan (AVAPRO) 300 MG tablet Take 1 tablet (300 mg total) by mouth every evening. 90 tablet 3    isosorbide mononitrate (IMDUR) 30 MG 24 hr tablet Take 1 tablet (30 mg total) by mouth once daily. 30 tablet 2    lancets (ACCU-CHEK SOFTCLIX LANCETS) Misc Uses Accu-Chek Angelica meter to test BG 1x/day 400 each 3    levocetirizine (XYZAL) 5 MG tablet Take 1 tablet (5 mg total) by mouth every evening. 30 tablet 11    magnesium oxide (MAG-OX) 400 mg tablet Take 1 tablet (400 mg total) by mouth once daily.  0    montelukast (SINGULAIR) 10 mg tablet Take 1 tablet (10 mg total) by mouth every evening. 90 tablet 3    nitroGLYCERIN (NITROSTAT) 0.4 MG SL tablet Place 0.4 mg under the tongue every 5 (five) minutes as needed for Chest pain.       omega-3 fatty acids 500 mg Cap Take 1 capsule by mouth Twice daily.      pen needle, diabetic (BD ULTRA-FINE MINI PEN NEEDLE) 31 gauge x 3/16" Ndle USE WITH LANTUS AT BEDTIME 400 each 3    polyethylene glycol (GLYCOLAX) 17 gram PwPk Take 17 g by mouth daily as needed (constipation). 10 each 1    pravastatin (PRAVACHOL) 40 MG tablet Take 1 tablet (40 mg total) by mouth once daily. 90 tablet 3    pulse oximeter (PULSE OXIMETER) device by Apply Externally route 2 (two) times a day. Use twice daily at 8 AM and 3 PM and record the value in [employer] as directed. 1 each 0    tiotropium (SPIRIVA) 18 mcg inhalation capsule Inhale 1 capsule (18 mcg total) into the lungs once daily. Controller 90 capsule 3    VENOFER 100 mg iron/5 mL injection        No facility-administered encounter medications on file as of 3/3/2022.     No orders of the defined types were placed in this encounter.      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  - hypertonic saline nabs and acapella valve ordered.  - repeat CT in late May - early June 2022 for 3 month follow up of pulmonary nodule.  - continue spiriva, wixela (250-50), PRN albuterol, PRN Duonebs, singulair, flonase, xyzal.  - continue to follow with cardiology/PCP for optimization of heart failure.  - pleural effusions previously tapped late 2019, and found to be exudate.      RTC 1 months in person to personally evaluate for response to treatment. Pathology was unrevealing at that time, as were cultures.    Kelvin Maki MD  [facility]

## 2022-03-04 ENCOUNTER — TELEPHONE (OUTPATIENT)
Dept: PULMONOLOGY | Facility: CLINIC | Age: 82
End: 2022-03-04
Payer: MEDICARE

## 2022-03-04 ENCOUNTER — TELEPHONE (OUTPATIENT)
Dept: UROLOGY | Facility: CLINIC | Age: 82
End: 2022-03-04
Payer: MEDICARE

## 2022-03-04 NOTE — TELEPHONE ENCOUNTER
----- Message from Selin Small RN sent at 3/3/2022  5:32 PM CST -----  Contact: Andrés (daughter) @ [phone]    ----- Message -----  From: Madelyn Machado  Sent: 3/3/2022   3:58 PM CST  To: [facility] Clinical Staff    Pts son is calling to schedule a NP appt for pt but there is nothing available until 3-16-22.  Pt was discharged from the hospital on 3-1-22 with a catheter but not given any supplies or instructions.  Pls call.

## 2022-03-04 NOTE — TELEPHONE ENCOUNTER
I called back and spoke to Neto and a message was sent to field nurse that called to return my call. Neto verbalized understanding.

## 2022-03-04 NOTE — ASSESSMENT & PLAN NOTE
Hyponatremia, urinary retention  - Cr 2.3>>3.5>3.2, baseline ~2    Finally downtrending, at BL 2/28  - suspect prerenal in setting of diuresis  - give 500cc IVF, suspect hypovolemia hyponatremia (corrected Na 129)  - holding home lasix/ losartan  - UA, urine studies, osmol, RP sono completed: consistent with hyponatremia hypovolemia   Nephrology consulted for further assistance for persistent hyponatremia and VIRGILIO:   - Recommend fluid restriction of 1 to 1.5 L   - Will consider Lasix drip if Na continues to trend down and inadequate UOP for preferential water over Na diuresis.   - Will consider tolvaptan if Na decreases to low 120s without improvement in Cr, poor UOP and if BG better controlled  - Hold Lasix bolus doses.       - BID BMP  - ok to resume home lasix at dc per nephro  - Cr back to baseline at dc  - 2/28 pt with acute urinary retention, start oxybutynin and monitor: lauren placed, refer to urology at dc  - avoid nephrotoxic agents  - monitor with daily labs  - strict I/Os

## 2022-03-04 NOTE — TELEPHONE ENCOUNTER
----- Message from Kirsten Esparza sent at 3/4/2022 12:34 PM CST -----  [facility] requesting call back regarding pt             Confirmed patient's contact info below:  Contact Name [facility]  Phone Number: [phone]

## 2022-03-04 NOTE — DISCHARGE SUMMARY
Nagi Aggarwal - Oncology ([facility])  [facility] Medicine  Discharge Summary      Patient Name: Myesha Quinones  MRN: [id number]  Patient Class: IP- Inpatient  Admission Date: 2/21/2022  Hospital Length of Stay: 7 days  Discharge Date and Time: 3/1/2022  4:52 PM  Attending Physician: Felicia att. providers found   Discharging Provider: Jolynn Kemp PA-C  Primary Care Provider: Catrachita Rothman MD  [facility] Medicine Team: [facility] Jolynn Kemp PA-C    HPI:   Myesha Quinones is a 81 y.o. female with a PMHx of CAD, ischemic cardiomyopathy with LBBB s/p biventricular ICD, CHF, CKD4, IDDM, breast cancer, HTN, asthma, COPD, and HLD who presents to the ED with complaints of shortness of breath. The patient reports shortness of breath beginning about 12 days ago. She reports her shortness of breath is worse with exertion. She was seen in cardiology clinic and started on lasix 20mg daily and reports weight loss of 5lbs and significant improvement of BLE edema. However, her dyspnea has been worsening. She reports a chronic cough that has become productive over the last week, noting green sputum. Endorses associated fatigue and generalized weakness. Denies orthopnea, PND, chest pain, palpitations, fever, chills, nausea, vomiting, diarrhea, or dysuria.    In the ED, patient found to be hypoxic requiring 2L O2 via nasal cannula. Patient also hypertensive and mildly tachycardic, afebrile. CBC unremarkable. CMP with baseline CKD. , improved from previous. Troponin WNL. Lactate and procal in process. UA negative for infection. CXR Stable right pleural fluid. Stable bilateral reticular opacities suggestive of pulmonary edema or infection. EKG with ventricularly paced rhythm, rate 84. The patient received oral hydralazine, duo neb treatment, and IV lasix in the ED.      * No surgery found *      Hospital Course:   Mrs. Quinones was admitted to observation for CHF and COPD exacerbation. Afebrile without leukocytosis.  Started on Lasix IV 20 mg BID and scheduled duonebs, prednisone, and antibiotics. Diuresed well, net neg 2L, however her Cr continues to increase. Suspect over diuresis. Will check urine studies, RP sono unremarkable. Give small IVF bolus. Nephrology consulted for further assistance, agree with over diuresis. Continue fluid restriction and T2DM control. BMP BID, Cr improving and BG. BP continues to be uncontrolled, IMDUR added to regimen. 2/28 patient began having significant urinary retention, bladder scan reveals >1L. Start oxybutynin and monitor, failed voiding trial, lauren placed. Stable for dc with cards, urology fu. Return precautions discussed, [facility] orders done       Goals of Care Treatment Preferences:  Code Status: Full Code      Consults:   Consults (From admission, onward)        Status Ordering Provider     Inpatient consult to Nephrology  Once        Provider:  (Not yet assigned)    Completed CHARLEY MILLER     Inpatient consult to Registered Dietitian/Nutritionist  Once        Provider:  (Not yet assigned)    Completed ZULY JULIEN          * Acute renal failure superimposed on stage 4 chronic kidney disease  Hyponatremia, urinary retention  - Cr 2.3>>3.5>3.2, baseline ~2    Finally downtrending, at BL 2/28  - suspect prerenal in setting of diuresis  - give 500cc IVF, suspect hypovolemia hyponatremia (corrected Na 129)  - holding home lasix/ losartan  - UA, urine studies, osmol, RP sono completed: consistent with hyponatremia hypovolemia   Nephrology consulted for further assistance for persistent hyponatremia and VIRGILIO:   - Recommend fluid restriction of 1 to 1.5 L   - Will consider Lasix drip if Na continues to trend down and inadequate UOP for preferential water over Na diuresis.   - Will consider tolvaptan if Na decreases to low 120s without improvement in Cr, poor UOP and if BG better controlled  - Hold Lasix bolus doses.       - BID BMP  - ok to resume home lasix at dc per nephro  - Cr  back to baseline at dc  - 2/28 pt with acute urinary retention, start oxybutynin and monitor: lauren placed, refer to urology at [place]  - avoid nephrotoxic agents  - monitor with daily labs  - strict I/Os    Chronic obstructive pulmonary disease with acute exacerbation  Worsening SOB with productive cough with green sputum  - Afebrile without leukocytosis.   -Continue supplemental oxygen as needed, weaned to room air  - CTX/Doxy (given report of thick green sputum) and Predinsone x 5d- completed 2/26  - scheduled duonebs [id number]  - started mucinex-DM BID, tessalon perles TID PRN cough  -Continue daily inhalers  - monitor for hyperglycemia while on steroids    Acute on chronic combined systolic and diastolic heart failure  Nonischemic cardiomyopathy  Resolving  Patient is identified as having Combined Systolic and Diastolic heart failure that is Acute on Chronic. CHF is currently uncontrolled due to volume overload due to: Continued edema of extremities and JVD, Rales/crackles on pulmonary exam and Pulmonary edema/pleural effusion on CXR. Latest ECHO performed and demonstrates- Results for orders placed during the hospital encounter of 03/23/21    Echo Color Flow Doppler? Yes    Interpretation Summary  · The left ventricle is normal in size with eccentric hypertrophy and low normal systolic function. The estimated ejection fraction is 50%  · There are segmental left ventricular wall motion abnormalities.  · Normal left ventricular diastolic function.  · Normal right ventricular size with normal right ventricular systolic function.  · Mild tricuspid regurgitation.  · The estimated PA systolic pressure is 44 mmHg.  · There is pulmonary hypertension.  · Normal central venous pressure (3 mmHg).  . Continue Beta Blocker ACE/ARB Furosemide Nitrate/Vasodilator and monitor clinical status closely. Monitor on telemetry. Patient is on CHF pathway.  Monitor strict Is&Os and daily weights.  Place on fluid restriction of 1.5 L.  Continue to stress to patient importance of self efficacy and  on diet for CHF. Last BNP reviewed- and noted below   Recent Labs   Lab 02/25/22  0249   *   -ECHO ordered  - home lasix 20 mg daily  - hospital lasix 20mg IV BID--> held in setting of VIRGILIO, resome oral lasix at dc  Give small IVF back      Final Active Diagnoses:    Diagnosis Date Noted POA    PRINCIPAL PROBLEM:  Acute renal failure superimposed on stage 4 chronic kidney disease [N17.9, N18.4] 12/07/2019 Yes    Chronic obstructive pulmonary disease with acute exacerbation [J44.1] 04/04/2018 Yes    Acute on chronic combined systolic and diastolic heart failure [I50.43] 03/23/2021 Yes    Type 2 diabetes mellitus with hyperglycemia, with long-term current use of insulin [E11.65, Z79.4] 02/22/2018 Not Applicable    Hyperlipidemia associated with type 2 diabetes mellitus [E11.69, E78.5] 07/27/2020 Yes    Iron deficiency anemia [D50.9] 05/16/2017 Yes    Hypertension associated with diabetes [E11.59, I15.2] 07/20/2015 Yes    Obstructive sleep apnea [G47.33] 07/16/2021 Yes    Urinary retention [R33.9] 02/28/2022 No    Acute hyponatremia [E87.1] 02/24/2022 Yes    Metabolic acidosis [E87.2] 02/24/2022 Yes    CKD (chronic kidney disease) stage 4, GFR 15-29 ml/min [N18.4] 07/27/2020 Yes    Acute respiratory failure with hypoxia and hypercarbia [J96.01, J96.02] 12/19/2019 Yes    Proteinuria [R80.9] 01/21/2019 Yes    Biventricular ICD (implantable cardioverter-defibrillator) in place [Z95.810] 01/09/2014 Yes    Asthma, chronic [J45.909] 06/05/2013 Yes      Problems Resolved During this Admission:    Diagnosis Date Noted Date Resolved POA    Nonischemic cardiomyopathy [I42.8] 07/25/2012 02/22/2022 Yes       Discharged Condition: stable    Disposition: Home or Self Care    Follow Up:    Patient Instructions:      Ambulatory referral/consult to Outpatient Case Management   Referral Priority: Routine Referral Type: Consultation   Referral  Reason: Specialty Services Required   Number of Visits Requested: 1     Ambulatory referral/consult to Urology   Standing Status: Future   Referral Priority: Routine Referral Type: Consultation   Referral Reason: Specialty Services Required   Requested Specialty: Urology   Number of Visits Requested: 1     Notify your health care provider if you experience any of the following:  temperature >100.4     Notify your health care provider if you experience any of the following:  severe uncontrolled pain     Notify your health care provider if you experience any of the following:  redness, tenderness, or signs of infection (pain, swelling, redness, odor or green/yellow discharge around incision site)     Notify your health care provider if you experience any of the following:  difficulty breathing or increased cough     Notify your health care provider if you experience any of the following:  worsening rash     Notify your health care provider if you experience any of the following:  increased confusion or weakness     Notify your health care provider if you experience any of the following:  persistent dizziness, light-headedness, or visual disturbances     Activity as tolerated       Significant Diagnostic Studies: Radiology: CT scan: CT Chest: Obliteration of the right bronchus intermedius and bilateral lower lobes basal segments bronchi possibly by mucous, aspiration or pneumonia.  There is complete volume loss of the middle lobe and patchy subsegmental atelectasis or airspace disease of the basal segments of the bilateral lower lobes right more than left.  Consider aspiration or pneumonia.    Pending Diagnostic Studies:     None         Medications:  Reconciled Home Medications:      Medication List      START taking these medications    benzonatate 100 MG capsule  Commonly known as: TESSALON  Take 1 capsule (100 mg total) by mouth 3 (three) times daily as needed for Cough.     isosorbide mononitrate 30 MG 24 hr  tablet  Commonly known as: IMDUR  Take 1 tablet (30 mg total) by mouth once daily.     polyethylene glycol 17 gram Pwpk  Commonly known as: GLYCOLAX  Take 17 g by mouth daily as needed (constipation).        CONTINUE taking these medications    ACCU-CHEK HECTOR Strp  Generic drug: blood sugar diagnostic  Uses Accu-Check Hector meter to test BG 4x/day     acetaminophen 500 MG tablet  Commonly known as: TYLENOL  Take 1,000 mg by mouth daily as needed for Pain.     albuterol 90 mcg/actuation inhaler  Commonly known as: PROVENTIL/VENTOLIN HFA  INHALE 2 PUFFS INTO THE LUNGS EVERY 6 (SIX) HOURS AS NEEDED FOR WHEEZING. RESCUE     albuterol-ipratropium 2.5 mg-0.5 mg/3 mL nebulizer solution  Commonly known as: DUO-NEB  TAKE 3 MLS BY NEBULIZATION EVERY 4 (FOUR) HOURS AS NEEDED FOR WHEEZING.     ascorbic acid (vitamin C) 100 MG tablet  Commonly known as: VITAMIN C  Take 100 mg by mouth once daily.     B-12 DOTS ORAL  Take 1 tablet by mouth once daily.     carvediloL 25 MG tablet  Commonly known as: COREG  TAKE 2 TABLETS (50 MG TOTAL) BY MOUTH 2 (TWO) TIMES DAILY.     cholecalciferol (vitamin D3) 50 mcg (2,000 unit) Tab  Commonly known as: VITAMIN D3  Take 1 tablet by mouth once daily.     cloNIDine 0.1 mg/24 hr td ptwk 0.1 mg/24 hr  Commonly known as: CATAPRES  Place 1 patch onto the skin every Tuesday.     diltiaZEM 240 MG 24 hr capsule  Commonly known as: CARDIZEM CD  Take 1 capsule (240 mg total) by mouth once daily.     FLUAD QUAD 2021-22(65Y UP)(PF) 60 mcg (15 mcg x 4)/0.5 mL Syrg  Generic drug: flu vac 2021 65up-cefLG89P(PF)     fluticasone propionate 50 mcg/actuation nasal spray  Commonly known as: FLONASE  2 sprays (100 mcg total) by Each Nostril route once daily.     fluticasone-salmeterol 250-50 mcg/dose 250-50 mcg/dose diskus inhaler  Commonly known as: ADVAIR  Inhale 1 puff into the lungs 2 (two) times daily. Controller     furosemide 20 MG tablet  Commonly known as: LASIX  Take 1 tablet (20 mg total) by mouth once  "daily.     hydrALAZINE 100 MG tablet  Commonly known as: APRESOLINE  TAKE 1 TABLET BY MOUTH THREE TIMES A DAY     irbesartan 300 MG tablet  Commonly known as: AVAPRO  Take 1 tablet (300 mg total) by mouth every evening.     lancets Misc  Commonly known as: ACCU-CHEK SOFTCLIX LANCETS  Uses Accu-Chek Angelica meter to test BG 1x/day     LANTUS SOLOSTAR U-100 INSULIN glargine 100 units/mL (3mL) SubQ pen  Generic drug: insulin  Inject 22 Units into the skin once daily. Increase per providers instruction. Max TDD 40units.     levocetirizine 5 MG tablet  Commonly known as: XYZAL  Take 1 tablet (5 mg total) by mouth every evening.     magnesium oxide 400 mg (241.3 mg magnesium) tablet  Commonly known as: MAG-OX  Take 1 tablet (400 mg total) by mouth once daily.     montelukast 10 mg tablet  Commonly known as: SINGULAIR  Take 1 tablet (10 mg total) by mouth every evening.     nitroGLYCERIN 0.4 MG SL tablet  Commonly known as: NITROSTAT  Place 0.4 mg under the tongue every 5 (five) minutes as needed for Chest pain.     omega-3 fatty acids 500 mg Cap  Take 1 capsule by mouth Twice daily.     pen needle, diabetic 31 gauge x 3/16" Ndle  Commonly known as: BD ULTRA-FINE MINI PEN NEEDLE  USE WITH LANTUS AT BEDTIME     pravastatin 40 MG tablet  Commonly known as: PRAVACHOL  Take 1 tablet (40 mg total) by mouth once daily.     pulse oximeter device  Commonly known as: pulse oximeter  by Apply Externally route 2 (two) times a day. Use twice daily at 8 AM and 3 PM and [facility] as directed.     tiotropium 18 mcg inhalation capsule  Commonly known as: SPIRIVA  Inhale 1 capsule (18 mcg total) into the lungs once daily. Controller     VENOFER 100 mg iron/5 mL injection  Generic drug: iron sucrose            Indwelling Lines/Drains at time of discharge:   Lines/Drains/Airways     Drain  Duration                Urethral Catheter 03/01/22 0000 16 Fr. 2 days                Time spent on the discharge of patient: >45 " minutes     discussed with staff    Jolynn Kemp PA-C  Department of Hospital Medicine  [facility] ([facility])

## 2022-03-04 NOTE — TELEPHONE ENCOUNTER
I spoke to Neto with [facility] and she requested Dr Maki's lab orders be faxed over to fax#[phone].

## 2022-03-04 NOTE — ASSESSMENT & PLAN NOTE
Worsening SOB with productive cough with green sputum  - Afebrile without leukocytosis.   -Continue supplemental oxygen as needed, weaned to room air  - CTX/Doxy (given report of thick green sputum) and Predinsone x 5d- completed 2/26  - scheduled duonebs [id number]  - started mucinex-DM BID, tessalon perles TID PRN cough  -Continue daily inhalers  - monitor for hyperglycemia while on steroids

## 2022-03-04 NOTE — TELEPHONE ENCOUNTER
----- Message from [place] sent at 3/4/2022  2:19 PM CST -----  Contact: Neto park [facility] @ [phone]  Caller requesting a return phone call from lisha Michel

## 2022-03-04 NOTE — ASSESSMENT & PLAN NOTE
Nonischemic cardiomyopathy  Resolving  Patient is identified as having Combined Systolic and Diastolic heart failure that is Acute on Chronic. CHF is currently uncontrolled due to volume overload due to: Continued edema of extremities and JVD, Rales/crackles on pulmonary exam and Pulmonary edema/pleural effusion on CXR. Latest ECHO performed and demonstrates- Results for orders placed during the hospital encounter of 03/23/21    Echo Color Flow Doppler? Yes    Interpretation Summary  · The left ventricle is normal in size with eccentric hypertrophy and low normal systolic function. The estimated ejection fraction is 50%  · There are segmental left ventricular wall motion abnormalities.  · Normal left ventricular diastolic function.  · Normal right ventricular size with normal right ventricular systolic function.  · Mild tricuspid regurgitation.  · The estimated PA systolic pressure is 44 mmHg.  · There is pulmonary hypertension.  · Normal central venous pressure (3 mmHg).  . Continue Beta Blocker ACE/ARB Furosemide Nitrate/Vasodilator and monitor clinical status closely. Monitor on telemetry. Patient is on CHF pathway.  Monitor strict Is&Os and daily weights.  Place on fluid restriction of 1.5 L. Continue to stress to patient importance of self efficacy and  on diet for CHF. Last BNP reviewed- and noted below   Recent Labs   Lab 02/25/22  0249   *   -ECHO ordered  - home lasix 20 mg daily  - hospital lasix 20mg IV BID--> held in setting of VIRGILIO, resome oral lasix at dc  Give small IVF back

## 2022-03-07 ENCOUNTER — OUTPATIENT CASE MANAGEMENT (OUTPATIENT)
Dept: ADMINISTRATIVE | Facility: OTHER | Age: 82
End: 2022-03-07
Payer: MEDICARE

## 2022-03-07 DIAGNOSIS — J45.50 SEVERE PERSISTENT CHRONIC ASTHMA WITHOUT COMPLICATION: ICD-10-CM

## 2022-03-07 RX ORDER — SODIUM CHLORIDE FOR INHALATION 3 %
4 VIAL, NEBULIZER (ML) INHALATION 4 TIMES DAILY PRN
Qty: 750 ML | Refills: 11 | Status: SHIPPED | OUTPATIENT
Start: 2022-03-07 | End: 2022-07-08

## 2022-03-07 NOTE — TELEPHONE ENCOUNTER
----- Message from Feliciano Talley sent at 3/7/2022  9:29 AM CST -----  Regarding: prescription  Contact: pt  Pt son Macho requesting a call back in regards to prescription sent over to pharmacy advise they no longer supply that, pt son would like for an alternative prescription to be sent over.        Macho @ [phone]

## 2022-03-07 NOTE — PROGRESS NOTES
Outpatient Care Management   - Low Risk Patient Assessment    Patient: Myesha Quinones  MRN:  [id number]  Date of Service:  3/7/2022  Completed by:  Viridiana Melissa LCSW  Referral Date: 02/23/2022  Program: OPCM Low Risk    Reason for Visit   Patient presents with    Social Work Assessment - Low/Mod Risk    PHQ-9    Case Closure    Plan Of Care       Brief Summary: Sw received referral for patient from [facility] inpatient care management. Sw completed low risk assessment with Pt via telephone. Pt reported living alone and reported being independent with ADLs. Pt has walker at home and occasionally use walker when walking long distances. Pt confirms she is receiving home health services following hospital admission. Pt reported family provides transportation to medical appointments. She reported daughter Betsy involved with medical care. Pt denied having difficulty affording food and housing. Pt reported some challenges affording diabetic and COPD medications. Sw offered to send referral to [facility] Patient Assistance team on Pt's behalf and she voiced agreement. Pt stated no additional needs. In-basket message sent to [facility] to reach out to Pt regarding medication assistance. Case closed.     Patient Summary     OPCM Social Work Assessment (Low/Moderate Risk)    General  Level of Caregiver support: Member independent and does not need caregiver assistance  Have you had to make a decision between paying for any of the following in the last 2 months?: None  Transportation means: Family  Employment status: Retired and not working  Do you have any of the following?: Caregiver make informed decisions on your behalf  Assessments  Was the PHQ Depression Screening completed this visit?: Yes  Was the JUSTA-7 Screening completed this visit?: No         Complex Care Plan     Care plan was discussed and completed today with input from patient and/or caregiver.    Patient Instructions      No follow-ups on file.    Todays OPCM Self-Management Care Plan was developed with the patients/caregivers input and was based on identified barriers from todays assessment.  Goals were written today with the patient/caregiver and the patient has agreed to work towards these goals to improve his/her overall well-being. Patient verbalized understanding of the care plan, goals, and all of today's instructions. Encouraged patient/caregiver to communicate with his/her physician and health care team about health conditions and the treatment plan.  Provided my contact information today and encouraged patient/caregiver to call me with any questions as needed.

## 2022-03-07 NOTE — TELEPHONE ENCOUNTER
The 3% hypertonic saline in on shortage, per multiple pharmacy reports (including [facility] outpatient and retail pharmacies).  We will utilize albuterol nebs instead on a PRN basis.  I discussed this with Macho (patient's son) and all are in agreement.    Kelvin Maki MD  [facility]

## 2022-03-07 NOTE — TELEPHONE ENCOUNTER
I spoke to patient's son, Macho. He stated the pharmacy no longer has sodium chloride and was calling to see if Dr Maki can send a different rx to [facility] on [address]. I let Macho know I would send a message to  Dr Maki to advise. He verbalized understanding.

## 2022-03-09 ENCOUNTER — LAB VISIT (OUTPATIENT)
Dept: LAB | Facility: HOSPITAL | Age: 82
End: 2022-03-09
Payer: MEDICARE

## 2022-03-09 ENCOUNTER — OFFICE VISIT (OUTPATIENT)
Dept: CARDIOLOGY | Facility: CLINIC | Age: 82
End: 2022-03-09
Payer: MEDICARE

## 2022-03-09 ENCOUNTER — CLINICAL SUPPORT (OUTPATIENT)
Dept: CARDIOLOGY | Facility: CLINIC | Age: 82
End: 2022-03-09
Payer: MEDICARE

## 2022-03-09 VITALS
HEART RATE: 57 BPM | HEIGHT: 63 IN | OXYGEN SATURATION: 92 % | WEIGHT: 142 LBS | DIASTOLIC BLOOD PRESSURE: 54 MMHG | BODY MASS INDEX: 25.16 KG/M2 | SYSTOLIC BLOOD PRESSURE: 109 MMHG

## 2022-03-09 VITALS
WEIGHT: 142 LBS | HEIGHT: 63 IN | DIASTOLIC BLOOD PRESSURE: 54 MMHG | SYSTOLIC BLOOD PRESSURE: 109 MMHG | BODY MASS INDEX: 25.16 KG/M2 | OXYGEN SATURATION: 92 % | HEART RATE: 57 BPM

## 2022-03-09 DIAGNOSIS — I10 ESSENTIAL HYPERTENSION: ICD-10-CM

## 2022-03-09 DIAGNOSIS — I15.2 HYPERTENSION ASSOCIATED WITH DIABETES: ICD-10-CM

## 2022-03-09 DIAGNOSIS — G47.33 OBSTRUCTIVE SLEEP APNEA: ICD-10-CM

## 2022-03-09 DIAGNOSIS — I25.5 CARDIOMYOPATHY, ISCHEMIC: ICD-10-CM

## 2022-03-09 DIAGNOSIS — J96.01 ACUTE HYPOXEMIC RESPIRATORY FAILURE: ICD-10-CM

## 2022-03-09 DIAGNOSIS — Z95.810 BIVENTRICULAR ICD (IMPLANTABLE CARDIOVERTER-DEFIBRILLATOR) IN PLACE: ICD-10-CM

## 2022-03-09 DIAGNOSIS — E11.69 HYPERLIPIDEMIA ASSOCIATED WITH TYPE 2 DIABETES MELLITUS: ICD-10-CM

## 2022-03-09 DIAGNOSIS — I50.42 CHRONIC COMBINED SYSTOLIC AND DIASTOLIC HEART FAILURE: Primary | ICD-10-CM

## 2022-03-09 DIAGNOSIS — E78.5 HYPERLIPIDEMIA ASSOCIATED WITH TYPE 2 DIABETES MELLITUS: ICD-10-CM

## 2022-03-09 DIAGNOSIS — E11.59 HYPERTENSION ASSOCIATED WITH DIABETES: ICD-10-CM

## 2022-03-09 DIAGNOSIS — N18.4 CKD (CHRONIC KIDNEY DISEASE) STAGE 4, GFR 15-29 ML/MIN: ICD-10-CM

## 2022-03-09 DIAGNOSIS — Z79.4 TYPE 2 DIABETES MELLITUS WITH HYPERGLYCEMIA, WITH LONG-TERM CURRENT USE OF INSULIN: ICD-10-CM

## 2022-03-09 DIAGNOSIS — I44.7 LBBB (LEFT BUNDLE BRANCH BLOCK): ICD-10-CM

## 2022-03-09 DIAGNOSIS — J42 CHRONIC BRONCHITIS, UNSPECIFIED CHRONIC BRONCHITIS TYPE: ICD-10-CM

## 2022-03-09 DIAGNOSIS — E11.65 TYPE 2 DIABETES MELLITUS WITH HYPERGLYCEMIA, WITH LONG-TERM CURRENT USE OF INSULIN: ICD-10-CM

## 2022-03-09 LAB
ALBUMIN SERPL BCP-MCNC: 2.8 G/DL (ref 3.5–5.2)
ALP SERPL-CCNC: 96 U/L (ref 55–135)
ALT SERPL W/O P-5'-P-CCNC: 15 U/L (ref 10–44)
ANION GAP SERPL CALC-SCNC: 12 MMOL/L (ref 8–16)
AST SERPL-CCNC: 15 U/L (ref 10–40)
BASOPHILS # BLD AUTO: 0.03 K/UL (ref 0–0.2)
BASOPHILS NFR BLD: 0.3 % (ref 0–1.9)
BILIRUB SERPL-MCNC: 0.3 MG/DL (ref 0.1–1)
BNP SERPL-MCNC: 303 PG/ML (ref 0–99)
BUN SERPL-MCNC: 76 MG/DL (ref 8–23)
CALCIUM SERPL-MCNC: 8.6 MG/DL (ref 8.7–10.5)
CHLORIDE SERPL-SCNC: 96 MMOL/L (ref 95–110)
CO2 SERPL-SCNC: 22 MMOL/L (ref 23–29)
CREAT SERPL-MCNC: 3.1 MG/DL (ref 0.5–1.4)
DIFFERENTIAL METHOD: ABNORMAL
EOSINOPHIL # BLD AUTO: 0.2 K/UL (ref 0–0.5)
EOSINOPHIL NFR BLD: 2.3 % (ref 0–8)
ERYTHROCYTE [DISTWIDTH] IN BLOOD BY AUTOMATED COUNT: 13.2 % (ref 11.5–14.5)
EST. GFR  (AFRICAN AMERICAN): 15.4 ML/MIN/1.73 M^2
EST. GFR  (NON AFRICAN AMERICAN): 13.4 ML/MIN/1.73 M^2
GLUCOSE SERPL-MCNC: 225 MG/DL (ref 70–110)
HCT VFR BLD AUTO: 29.1 % (ref 37–48.5)
HGB BLD-MCNC: 9.1 G/DL (ref 12–16)
IMM GRANULOCYTES # BLD AUTO: 0.12 K/UL (ref 0–0.04)
IMM GRANULOCYTES NFR BLD AUTO: 1.3 % (ref 0–0.5)
LYMPHOCYTES # BLD AUTO: 1.1 K/UL (ref 1–4.8)
LYMPHOCYTES NFR BLD: 11.4 % (ref 18–48)
MAGNESIUM SERPL-MCNC: 1.9 MG/DL (ref 1.6–2.6)
MCH RBC QN AUTO: 29.1 PG (ref 27–31)
MCHC RBC AUTO-ENTMCNC: 31.3 G/DL (ref 32–36)
MCV RBC AUTO: 93 FL (ref 82–98)
MONOCYTES # BLD AUTO: 0.5 K/UL (ref 0.3–1)
MONOCYTES NFR BLD: 5.4 % (ref 4–15)
NEUTROPHILS # BLD AUTO: 7.5 K/UL (ref 1.8–7.7)
NEUTROPHILS NFR BLD: 79.3 % (ref 38–73)
NRBC BLD-RTO: 0 /100 WBC
PHOSPHATE SERPL-MCNC: 5.5 MG/DL (ref 2.7–4.5)
PLATELET # BLD AUTO: 241 K/UL (ref 150–450)
PMV BLD AUTO: 10.5 FL (ref 9.2–12.9)
POTASSIUM SERPL-SCNC: 5.1 MMOL/L (ref 3.5–5.1)
PROT SERPL-MCNC: 6.4 G/DL (ref 6–8.4)
RBC # BLD AUTO: 3.13 M/UL (ref 4–5.4)
SODIUM SERPL-SCNC: 130 MMOL/L (ref 136–145)
WBC # BLD AUTO: 9.48 K/UL (ref 3.9–12.7)

## 2022-03-09 PROCEDURE — 99999 PR PBB SHADOW E&M-EST. PATIENT-LVL V: ICD-10-PCS | Mod: PBBFAC,,,

## 2022-03-09 PROCEDURE — 99999 PR PBB SHADOW E&M-EST. PATIENT-LVL III: CPT | Mod: PBBFAC,,,

## 2022-03-09 PROCEDURE — 83880 ASSAY OF NATRIURETIC PEPTIDE: CPT

## 2022-03-09 PROCEDURE — 36415 COLL VENOUS BLD VENIPUNCTURE: CPT

## 2022-03-09 PROCEDURE — 3288F FALL RISK ASSESSMENT DOCD: CPT | Mod: CPTII,S$GLB,,

## 2022-03-09 PROCEDURE — 84100 ASSAY OF PHOSPHORUS: CPT

## 2022-03-09 PROCEDURE — 1126F AMNT PAIN NOTED NONE PRSNT: CPT | Mod: CPTII,S$GLB,,

## 2022-03-09 PROCEDURE — 1101F PR PT FALLS ASSESS DOC 0-1 FALLS W/OUT INJ PAST YR: ICD-10-PCS | Mod: CPTII,S$GLB,,

## 2022-03-09 PROCEDURE — 80053 COMPREHEN METABOLIC PANEL: CPT

## 2022-03-09 PROCEDURE — 1159F PR MEDICATION LIST DOCUMENTED IN MEDICAL RECORD: ICD-10-PCS | Mod: CPTII,S$GLB,,

## 2022-03-09 PROCEDURE — 1159F MED LIST DOCD IN RCRD: CPT | Mod: CPTII,S$GLB,,

## 2022-03-09 PROCEDURE — 3078F PR MOST RECENT DIASTOLIC BLOOD PRESSURE < 80 MM HG: ICD-10-PCS | Mod: CPTII,S$GLB,,

## 2022-03-09 PROCEDURE — 99214 PR OFFICE/OUTPT VISIT, EST, LEVL IV, 30-39 MIN: ICD-10-PCS | Mod: S$GLB,,,

## 2022-03-09 PROCEDURE — 3074F PR MOST RECENT SYSTOLIC BLOOD PRESSURE < 130 MM HG: ICD-10-PCS | Mod: CPTII,S$GLB,,

## 2022-03-09 PROCEDURE — 1111F DSCHRG MED/CURRENT MED MERGE: CPT | Mod: CPTII,S$GLB,,

## 2022-03-09 PROCEDURE — 85025 COMPLETE CBC W/AUTO DIFF WBC: CPT

## 2022-03-09 PROCEDURE — 1101F PT FALLS ASSESS-DOCD LE1/YR: CPT | Mod: CPTII,S$GLB,,

## 2022-03-09 PROCEDURE — 99999 PR PBB SHADOW E&M-EST. PATIENT-LVL III: ICD-10-PCS | Mod: PBBFAC,,,

## 2022-03-09 PROCEDURE — 3288F PR FALLS RISK ASSESSMENT DOCUMENTED: ICD-10-PCS | Mod: CPTII,S$GLB,,

## 2022-03-09 PROCEDURE — 99214 OFFICE O/P EST MOD 30 MIN: CPT | Mod: S$GLB,,,

## 2022-03-09 PROCEDURE — 1111F PR DISCHARGE MEDS RECONCILED W/ CURRENT OUTPATIENT MED LIST: ICD-10-PCS | Mod: CPTII,S$GLB,,

## 2022-03-09 PROCEDURE — 3078F DIAST BP <80 MM HG: CPT | Mod: CPTII,S$GLB,,

## 2022-03-09 PROCEDURE — 1126F PR PAIN SEVERITY QUANTIFIED, NO PAIN PRESENT: ICD-10-PCS | Mod: CPTII,S$GLB,,

## 2022-03-09 PROCEDURE — 83735 ASSAY OF MAGNESIUM: CPT

## 2022-03-09 PROCEDURE — 99999 PR PBB SHADOW E&M-EST. PATIENT-LVL V: CPT | Mod: PBBFAC,,,

## 2022-03-09 PROCEDURE — 3074F SYST BP LT 130 MM HG: CPT | Mod: CPTII,S$GLB,,

## 2022-03-09 RX ORDER — CARVEDILOL 25 MG/1
TABLET ORAL
Qty: 360 TABLET | Refills: 3 | Status: SHIPPED | OUTPATIENT
Start: 2022-03-09 | End: 2023-05-15 | Stop reason: SDUPTHER

## 2022-03-09 RX ORDER — HYDRALAZINE HYDROCHLORIDE 100 MG/1
100 TABLET, FILM COATED ORAL 3 TIMES DAILY
Qty: 270 TABLET | Refills: 3 | Status: CANCELLED | OUTPATIENT
Start: 2022-03-09

## 2022-03-09 RX ORDER — DILTIAZEM HYDROCHLORIDE 240 MG/1
240 CAPSULE, COATED, EXTENDED RELEASE ORAL DAILY
Qty: 30 CAPSULE | Refills: 11 | Status: SHIPPED | OUTPATIENT
Start: 2022-03-09 | End: 2022-04-11 | Stop reason: ALTCHOICE

## 2022-03-09 RX ORDER — CARVEDILOL 25 MG/1
TABLET ORAL
Qty: 360 TABLET | Refills: 3 | Status: CANCELLED | OUTPATIENT
Start: 2022-03-09

## 2022-03-09 RX ORDER — DILTIAZEM HYDROCHLORIDE 240 MG/1
240 CAPSULE, COATED, EXTENDED RELEASE ORAL DAILY
Qty: 30 CAPSULE | Refills: 11 | Status: CANCELLED | OUTPATIENT
Start: 2022-03-09 | End: 2023-03-09

## 2022-03-09 RX ORDER — HYDRALAZINE HYDROCHLORIDE 100 MG/1
100 TABLET, FILM COATED ORAL 3 TIMES DAILY
Qty: 270 TABLET | Refills: 3 | Status: SHIPPED | OUTPATIENT
Start: 2022-03-09 | End: 2022-03-29

## 2022-03-09 NOTE — PROGRESS NOTES
[facility] Provider Note (Initial Clinic) Consult Note    Date of original referral: 2/22/22  Age: 81 y.o.  Gender: female  Ethnicity: White  Number of admissions for CHF within the preceding year: 1   Duration of CHF: ~10 yrs  Type of Congestive Heart Failure: Combined   Etiology: Ischemic   Social history: no smoking history   Enrolled in Infusion suite: no    Diagnostic Labs:   EKG - 02/22/2022  CXR - 02/24/2022  ECHO - 02/22/2022  Stress test -   Stress echo -   Pharmacologic stress -   Cardiac catheterization -    Cardiac MRI -     Lab Results   Component Value Date     (L) 03/09/2022     (L) 03/01/2022    K 5.1 03/09/2022    K 4.2 03/01/2022    CL 96 03/09/2022     03/01/2022    CO2 22 (L) 03/09/2022    CO2 18 (L) 03/01/2022     (H) 03/09/2022     (H) 03/01/2022    BUN 76 (H) 03/09/2022    BUN 66 (H) 03/01/2022    CREATININE 3.1 (H) 03/09/2022    CREATININE 2.2 (H) 03/01/2022    CALCIUM 8.6 (L) 03/09/2022    CALCIUM 8.2 (L) 03/01/2022    PROT 6.4 03/09/2022    PROT 6.8 02/25/2022    ALBUMIN 2.8 (L) 03/09/2022    ALBUMIN 3.0 (L) 02/25/2022    BILITOT 0.3 03/09/2022    BILITOT 0.3 02/25/2022    ALKPHOS 96 03/09/2022    ALKPHOS 100 02/25/2022    AST 15 03/09/2022    AST 19 02/25/2022    ALT 15 03/09/2022    ALT 19 02/25/2022    ANIONGAP 12 03/09/2022    ANIONGAP 13 03/01/2022    ESTGFRAFRICA 15.4 (A) 03/09/2022    ESTGFRAFRICA 23.4 (A) 03/01/2022    EGFRNONAA 13.4 (A) 03/09/2022    EGFRNONAA 20.3 (A) 03/01/2022       Lab Results   Component Value Date    WBC 9.48 03/09/2022    WBC 9.71 02/28/2022    RBC 3.13 (L) 03/09/2022    RBC 2.96 (L) 02/28/2022    HGB 9.1 (L) 03/09/2022    HGB 8.7 (L) 02/28/2022    HCT 29.1 (L) 03/09/2022    HCT 27.4 (L) 02/28/2022    MCV 93 03/09/2022    MCV 93 02/28/2022    MCH 29.1 03/09/2022    MCH 29.4 02/28/2022    MCHC 31.3 (L) 03/09/2022    MCHC 31.8 (L) 02/28/2022    RDW 13.2 03/09/2022    RDW 13.1 02/28/2022     03/09/2022     02/28/2022     MPV 10.5 03/09/2022    MPV 10.7 02/28/2022    IMMGR 1.3 (H) 03/09/2022    IMMGR 2.4 (H) 02/28/2022    IGABS 0.12 (H) 03/09/2022    IGABS 0.23 (H) 02/28/2022    LYMPH 1.1 03/09/2022    LYMPH 11.4 (L) 03/09/2022    MONO 0.5 03/09/2022    MONO 5.4 03/09/2022    EOS 0.2 03/09/2022    EOS 0.1 02/28/2022    BASO 0.03 03/09/2022    BASO 0.03 02/28/2022    NRBC 0 03/09/2022    NRBC 0 02/28/2022    GRAN 7.5 03/09/2022    GRAN 79.3 (H) 03/09/2022    EOSINOPHIL 2.3 03/09/2022    EOSINOPHIL 1.4 02/28/2022    BASOPHIL 0.3 03/09/2022    BASOPHIL 0.3 02/28/2022    PLTEST Appears normal 12/12/2020    PLTEST Appears normal 12/09/2020    ANISO Slight 12/12/2020    ANISO Slight 12/07/2019    HYPO sl 07/08/2004       Lab Results   Component Value Date     (H) 02/25/2022     (H) 02/21/2022    MG 1.9 03/09/2022    MG 1.7 02/28/2022    PHOS 5.5 (H) 03/09/2022    PHOS 4.6 (H) 02/21/2022    TROPONINI 0.013 02/21/2022    TROPONINI 0.015 03/23/2021    HGBA1C 5.9 (H) 02/22/2022    HGBA1C 5.7 (H) 02/08/2022    TSH 0.592 06/02/2021    TSH 0.642 02/08/2021    FREET4 1.22 09/16/2016    FREET4 1.25 05/12/2016       Lab Results   Component Value Date    IRON 41 10/21/2020    TIBC 425 10/21/2020    FERRITIN 95 12/12/2020    CHOL 110 (L) 02/08/2022    TRIG 111 02/08/2022    HDL 44 02/08/2022    LDLCALC 43.8 (L) 02/08/2022    CHOLHDL 40.0 02/08/2022    TOTALCHOLEST 2.5 02/08/2022    NONHDLCHOL 66 02/08/2022    COLORU Yellow 02/28/2022    APPEARANCEUA Clear 02/28/2022    PHUR 5.0 02/28/2022    SPECGRAV 1.010 02/28/2022    PROTEINUA 2+ (A) 02/28/2022    GLUCUA 1+ (A) 02/28/2022    KETONESU Negative 02/28/2022    BILIRUBINUA Negative 02/28/2022    OCCULTUA Negative 02/28/2022    NITRITE Negative 02/28/2022    LEUKOCYTESUR Negative 02/28/2022       List all implanted cardiac devices:    Implanted cardio-defibrillator: Dual chamber      Current Outpatient Medications on File Prior to Visit   Medication Sig Dispense Refill    acetaminophen  (TYLENOL) 500 MG tablet Take 1,000 mg by mouth daily as needed for Pain.      albuterol (PROVENTIL/VENTOLIN HFA) 90 mcg/actuation inhaler INHALE 2 PUFFS INTO THE LUNGS EVERY 6 (SIX) HOURS AS NEEDED FOR WHEEZING. RESCUE 18 g 12    albuterol-ipratropium (DUO-NEB) 2.5 mg-0.5 mg/3 mL nebulizer solution TAKE 3 MLS BY NEBULIZATION EVERY 4 (FOUR) HOURS AS NEEDED FOR WHEEZING. 270 mL 12    ascorbic acid, vitamin C, (VITAMIN C) 100 MG tablet Take 100 mg by mouth once daily.      benzonatate (TESSALON) 100 MG capsule Take 1 capsule (100 mg total) by mouth 3 (three) times daily as needed for Cough. 20 capsule 0    blood sugar diagnostic (ACCU-CHEK HECTOR) Strp Uses Accu-Check Hector meter to test BG 4x/day 400 strip 6    carvediloL (COREG) 25 MG tablet TAKE 2 TABLETS (50 MG TOTAL) BY MOUTH 2 (TWO) TIMES DAILY. 360 tablet 3    cholecalciferol, vitamin D3, (VITAMIN D3) 50 mcg (2,000 unit) Tab Take 1 tablet by mouth once daily.       cloNIDine 0.1 mg/24 hr td ptwk (CATAPRES) 0.1 mg/24 hr Place 1 patch onto the skin every Tuesday. 12 patch 11    CYANOCOBALAMIN, VITAMIN B-12, (B-12 DOTS ORAL) Take 1 tablet by mouth once daily.      diltiaZEM (CARDIZEM CD) 240 MG 24 hr capsule Take 1 capsule (240 mg total) by mouth once daily. 30 capsule 11    FLUAD QUAD 2021-22,65Y UP,,PF, 60 mcg (15 mcg x 4)/0.5 mL Syrg       fluticasone propionate (FLONASE) 50 mcg/actuation nasal spray 2 sprays (100 mcg total) by Each Nostril route once daily. 16 g 12    fluticasone-salmeterol diskus inhaler 250-50 mcg Inhale 1 puff into the lungs 2 (two) times daily. Controller 180 each 3    furosemide (LASIX) 20 MG tablet Take 1 tablet (20 mg total) by mouth once daily. 90 tablet 2    hydrALAZINE (APRESOLINE) 100 MG tablet TAKE 1 TABLET BY MOUTH THREE TIMES A  tablet 3    insulin (LANTUS SOLOSTAR U-100 INSULIN) glargine 100 units/mL (3mL) SubQ pen Inject 22 Units into the skin once daily. Increase per providers instruction. Max TDD 40units.  "5 each 6    irbesartan (AVAPRO) 300 MG tablet Take 1 tablet (300 mg total) by mouth every evening. 90 tablet 3    isosorbide mononitrate (IMDUR) 30 MG 24 hr tablet Take 1 tablet (30 mg total) by mouth once daily. 30 tablet 2    lancets (ACCU-CHEK SOFTCLIX LANCETS) Misc Uses Accu-Chek Angelica meter to test BG 1x/day 400 each 3    levocetirizine (XYZAL) 5 MG tablet Take 1 tablet (5 mg total) by mouth every evening. 30 tablet 11    magnesium oxide (MAG-OX) 400 mg tablet Take 1 tablet (400 mg total) by mouth once daily.  0    montelukast (SINGULAIR) 10 mg tablet Take 1 tablet (10 mg total) by mouth every evening. 90 tablet 3    mucus clearing device (ACAPELLA, FLUTTER) by Misc.(Non-Drug; Combo Route) route 4 (four) times daily as needed (to relieve mucous). 1 each 0    nitroGLYCERIN (NITROSTAT) 0.4 MG SL tablet Place 0.4 mg under the tongue every 5 (five) minutes as needed for Chest pain.       omega-3 fatty acids 500 mg Cap Take 1 capsule by mouth Twice daily.      pen needle, diabetic (BD ULTRA-FINE MINI PEN NEEDLE) 31 gauge x 3/16" Ndle USE WITH LANTUS AT BEDTIME 400 each 3    polyethylene glycol (GLYCOLAX) 17 gram PwPk Take 17 g by mouth daily as needed (constipation). 10 each 1    pravastatin (PRAVACHOL) 40 MG tablet Take 1 tablet (40 mg total) by mouth once daily. 90 tablet 3    pulse oximeter (PULSE OXIMETER) device by Apply Externally route 2 (two) times a day. Use twice daily at 8 AM and 3 PM and [facility] the [facility] as directed. 1 each 0    sodium chloride 3% 3 % nebulizer solution TAKE 4 MLS BY NEBULIZATION 4 (FOUR) TIMES DAILY AS NEEDED FOR OTHER OR COUGH (TO INDUCE SPUTUM AND CLEAR MUCOUS.). 750 mL 11    tiotropium (SPIRIVA) 18 mcg inhalation capsule Inhale 1 capsule (18 mcg total) into the lungs once daily. Controller 90 capsule 3    VENOFER 100 mg iron/5 mL injection        No current facility-administered medications on file prior to visit.         HPI:  Patient can walk from parking " "garage to clinic today and pace normal before SOB, assisted by rolling walker   Patient sleeps on 1 number of pillows with CPAP   Patient wakes up SOB, has to get out of bed, associated cough- denies, reports continued cough productive for green sputum improved since hospitalization   Palpitations - denies   Dizzy, light-headed, pre-syncope or syncope- denies, reports feeling "spacey" after taking morning imdur. Denies pre-syncope/syncope/falls   Since discharge frequency of performing weights, home weight and weight change- performing daily weights, weight stable 134-135lbs   Other information felt pertinent to HPI: Ms. Myesha Quinones is a 81 y.o. female with a PMHx of CAD, ischemic cardiomyopathy with LBBB s/p biventricular ICD, CHF, CKD4, IDDM, breast cancer, HTN, asthma, COPD, and HLD who presents to first [facility] visit accompanied by son. Mrs. Quinones was admitted to observation for CHF and COPD exacerbation. Started on Lasix IV 20 mg BID and scheduled duonebs, prednisone, and antibiotics. Diuresed well, net neg 2L, however her Cr continues to increase. Suspect over diuresis. Will check urine studies, RP sono unremarkable. Give small IVF bolus. Nephrology consulted for further assistance, agree with over diuresis. Continue fluid restriction and T2DM control. BP continues to be uncontrolled, IMDUR added to regimen. 2/28 patient began having significant urinary retention, bladder scan reveals >1L. Start oxybutynin and monitor, failed voiding trial, lauren placed. HH established with cards, urology fu.     3/9/22: Today she reports breathing much improved since hospital stay. Still endorses continued cough productive for green sputum, but improved. She was discharged on lasix 20mg daily and has been restricting fluid intake to 2 16oz water bottles/day per nephrology recommendations for hyponatremia. Endorses chronic LE edema worsened with keeping feet dependent and improved after elevation.     PHYSICAL: "   Vitals:    03/09/22 1408   BP: (!) 109/54   Pulse: (!) 57      Wt Readings from Last 3 Encounters:   03/09/22 64.4 kg (142 lb)   03/09/22 64.4 kg (142 lb)   02/26/22 64.5 kg (142 lb 3.2 oz)       JVD: no   Heart rhythm: regular  Cardiac murmur: No    S3: no  S4: no  Lungs: wheezing, diminished breath sounds throughout  Hepatojugular reflux: no  Edema: yes, 2+ LLE edema, 1+ RLE edema      Echo 2/22/22:  · The left ventricle is normal in size with mildly decreased systolic function.  · The estimated ejection fraction is 45-50%. There is left ventricular global hypokinesis.  · Grade II left ventricular diastolic dysfunction.  · Mild mitral regurgitation.  · Normal right ventricular size with normal right ventricular systolic function.  · Mild tricuspid regurgitation.  · The estimated PA systolic pressure is 46 mmHg.  · Normal central venous pressure (3 mmHg).  · There is pulmonary hypertension.  · Severe left atrial enlargement.    ASSESSMENT: Chronic combined systolic and diastolic HF    PLAN:      Patient Instructions:    Instruct the patient to notify this clinic if HH, a physician or an advanced care provider wants to change medication one of their HF medications    Activity and Diet restrictions:   o Recommend 2-3 gram sodium restriction and 1500cc- 2000cc fluid restriction.  o Encourage physical activity with graded exercise program.  o Requested patient to weigh themselves daily, and to notify us if their weight increases by more than 3 lbs in 1 day or 5 lbs in 3 days.    Assigned dry weight on home scale: 134-135lbs  Medication changes (include current dose and changed dose): NYHA Class II symptoms. Cr uptrending 2.2->3.1. Instructed to increase fluid intake to closer to ~40-50oz/day. Will continue lasix 20mg daily for now. If Cr remains elevated next visit and euvolemic/minimal fluid on exam, will plan to reduce lasix from daily to prn. Continue current GDMT and instructed to bring home BP log for review.  If unable to tolerate, will d/c imdur next visit.  Upcoming labs and date anticipated: RTC in 1 week for repeat labs and follow up    Li Lott PA-C

## 2022-03-09 NOTE — PROGRESS NOTES
"  [facility]) First Week Visit     Pt presents to clinic  and accompanied by son.     Most Recent Hospital Discharge Date: 3/2/22  Last admission Diagnosis/chief complaint:SOB, cough        Visit Vitals:     Wt Readings from Last 3 Encounters:   03/09/22 64.4 kg (142 lb)   03/09/22 64.4 kg (142 lb)   02/26/22 64.5 kg (142 lb 3.2 oz)     Temp Readings from Last 3 Encounters:   03/01/22 98 °F (36.7 °C) (Oral)   01/25/22 98 °F (36.7 °C) (Oral)   10/07/21 98 °F (36.7 °C) (Oral)     BP Readings from Last 3 Encounters:   03/09/22 (!) 109/54   03/09/22 (!) 109/54   03/01/22 (!) 169/69     Pulse Readings from Last 3 Encounters:   03/09/22 (!) 57   03/09/22 (!) 57   03/01/22 71            Pt reports the following:  [x]  Shortness of Breath with activity  []  Shortness of Breath at rest/ sleeping on 2-3 pillows "some days"  []  Fatigue  []  Edema   [] Chest pain or tightness  [] Weight Increase since discharge  [] None of the above     Pt reports being in the GREEN (color) Zone. If in yellow/red, reminded that they should be calling [facility] today or now.      Medications:   · Medication reconciliation completed today per MA.  Pt reports having all medications available and understands how to take them appropriately. Reminded pt to call prior to making any changes to medications.      Education:    [x] Confirmed pt has  "Home Care Guide for Heart Failure Patients".   Reviewed key points as listed below.      · Recommend 2 gram sodium restriction and 1500cc fluid restriction.  · Encourage physical activity with graded exercise program.  · Requested patient to weigh themselves daily, and to notify us if their weight increases by more than 3 lbs in 1 day or 5 lbs in 3 days.      [x]Reviewed "Daily Weight and Symptom Tracker".  Reviewed with patient when and how to call  [facility] according to "Yellow Zone" and "Red Zone".                  [] Pt given list of low/high sodium food list                 " "  Watch for these Signs and Symptoms: If any of these occur, contact [facility] immediately:   Increase in shortness of breath with movement   Increase in swelling in your legs and ankles   Weight gain of more than 3 pounds in a night or 5 pounds in 3 days.   Difficulty breathing when you are lying down   Worsening fatigue or tiredness   Stomach bloating, a full feeling or a loss of appetite   Increased coughing--especially when you are lying down     MyChart and Care Companion:              Patient active on myChart? Yes, patient uses regularly.    Contacting our Team:              Reviewed with pt how to contact [facility] RN via phone or [employer] messaging.      [facility] Program Plan:  Pt educated on follow-up plan while in [facility] program.   [x]  PT given /reviewed upcoming appointment/check in dates. These will include weekly contact with RN or visits with providers over the next 4-6 weeks.                   [x]  Pt educated that they will transitioned to long term care provider team at week 4-6.  This team will be either Cardiology, PCP or Advanced Heart Failure depending on needs.          Pt was able to verbalize back to RN in their own words correct diet/fluid restrictions, necessity for exercise, warning signs and symptoms, when and how to contact their T[facility] team .       Plan:     Refer to provider visit for changes.      [x]  Pt given AVS with follow up appointment within 1 week and medication detail list for ease of use.     [x]  Explained to pt they will have a phone "check in" by RN in/on 1 week     [] Pt met with Umberto Maharaj Dietician today after visit.  Refer to his note for details.     Will follow up with pt at next clinic visit and RN navigator available for pt questions, issues or concerns.     Please refer to provider visit for additional details and assessment.  "

## 2022-03-10 ENCOUNTER — EXTERNAL HOME HEALTH (OUTPATIENT)
Dept: HOME HEALTH SERVICES | Facility: HOSPITAL | Age: 82
End: 2022-03-10
Payer: MEDICARE

## 2022-03-11 ENCOUNTER — OFFICE VISIT (OUTPATIENT)
Dept: UROLOGY | Facility: CLINIC | Age: 82
End: 2022-03-11
Payer: MEDICARE

## 2022-03-11 VITALS
BODY MASS INDEX: 25.35 KG/M2 | HEART RATE: 58 BPM | WEIGHT: 143.06 LBS | HEIGHT: 63 IN | SYSTOLIC BLOOD PRESSURE: 105 MMHG | DIASTOLIC BLOOD PRESSURE: 40 MMHG

## 2022-03-11 DIAGNOSIS — R33.9 URINARY RETENTION: ICD-10-CM

## 2022-03-11 DIAGNOSIS — N18.4 CKD (CHRONIC KIDNEY DISEASE) STAGE 4, GFR 15-29 ML/MIN: Primary | ICD-10-CM

## 2022-03-11 PROBLEM — E87.20 METABOLIC ACIDOSIS: Status: RESOLVED | Noted: 2022-02-24 | Resolved: 2022-03-11

## 2022-03-11 PROBLEM — N17.9 ACUTE RENAL FAILURE SUPERIMPOSED ON STAGE 4 CHRONIC KIDNEY DISEASE: Status: RESOLVED | Noted: 2019-12-07 | Resolved: 2022-03-11

## 2022-03-11 PROBLEM — R80.9 PROTEINURIA: Status: RESOLVED | Noted: 2019-01-21 | Resolved: 2022-03-11

## 2022-03-11 PROBLEM — E87.1 ACUTE HYPONATREMIA: Status: RESOLVED | Noted: 2022-02-24 | Resolved: 2022-03-11

## 2022-03-11 PROCEDURE — 99203 OFFICE O/P NEW LOW 30 MIN: CPT | Mod: S$GLB,,, | Performed by: UROLOGY

## 2022-03-11 PROCEDURE — 3074F SYST BP LT 130 MM HG: CPT | Mod: CPTII,S$GLB,, | Performed by: UROLOGY

## 2022-03-11 PROCEDURE — 1160F PR REVIEW ALL MEDS BY PRESCRIBER/CLIN PHARMACIST DOCUMENTED: ICD-10-PCS | Mod: CPTII,S$GLB,, | Performed by: UROLOGY

## 2022-03-11 PROCEDURE — 1159F PR MEDICATION LIST DOCUMENTED IN MEDICAL RECORD: ICD-10-PCS | Mod: CPTII,S$GLB,, | Performed by: UROLOGY

## 2022-03-11 PROCEDURE — 1126F AMNT PAIN NOTED NONE PRSNT: CPT | Mod: CPTII,S$GLB,, | Performed by: UROLOGY

## 2022-03-11 PROCEDURE — 1159F MED LIST DOCD IN RCRD: CPT | Mod: CPTII,S$GLB,, | Performed by: UROLOGY

## 2022-03-11 PROCEDURE — 1126F PR PAIN SEVERITY QUANTIFIED, NO PAIN PRESENT: ICD-10-PCS | Mod: CPTII,S$GLB,, | Performed by: UROLOGY

## 2022-03-11 PROCEDURE — 1111F DSCHRG MED/CURRENT MED MERGE: CPT | Mod: CPTII,S$GLB,, | Performed by: UROLOGY

## 2022-03-11 PROCEDURE — 3074F PR MOST RECENT SYSTOLIC BLOOD PRESSURE < 130 MM HG: ICD-10-PCS | Mod: CPTII,S$GLB,, | Performed by: UROLOGY

## 2022-03-11 PROCEDURE — 3078F DIAST BP <80 MM HG: CPT | Mod: CPTII,S$GLB,, | Performed by: UROLOGY

## 2022-03-11 PROCEDURE — 1160F RVW MEDS BY RX/DR IN RCRD: CPT | Mod: CPTII,S$GLB,, | Performed by: UROLOGY

## 2022-03-11 PROCEDURE — 99999 PR PBB SHADOW E&M-EST. PATIENT-LVL V: CPT | Mod: PBBFAC,,, | Performed by: UROLOGY

## 2022-03-11 PROCEDURE — 3078F PR MOST RECENT DIASTOLIC BLOOD PRESSURE < 80 MM HG: ICD-10-PCS | Mod: CPTII,S$GLB,, | Performed by: UROLOGY

## 2022-03-11 PROCEDURE — 1111F PR DISCHARGE MEDS RECONCILED W/ CURRENT OUTPATIENT MED LIST: ICD-10-PCS | Mod: CPTII,S$GLB,, | Performed by: UROLOGY

## 2022-03-11 PROCEDURE — 99203 PR OFFICE/OUTPT VISIT, NEW, LEVL III, 30-44 MIN: ICD-10-PCS | Mod: S$GLB,,, | Performed by: UROLOGY

## 2022-03-11 PROCEDURE — 99999 PR PBB SHADOW E&M-EST. PATIENT-LVL V: ICD-10-PCS | Mod: PBBFAC,,, | Performed by: UROLOGY

## 2022-03-11 RX ORDER — LEVOCETIRIZINE DIHYDROCHLORIDE 5 MG/1
5 TABLET, FILM COATED ORAL
COMMUNITY
End: 2022-07-08

## 2022-03-11 RX ORDER — INSULIN GLARGINE 100 [IU]/ML
22 INJECTION, SOLUTION SUBCUTANEOUS
COMMUNITY
End: 2022-06-06 | Stop reason: SDUPTHER

## 2022-03-11 NOTE — PROGRESS NOTES
Subjective:       Patient ID: Myesha Quinones is a 81 y.o. female.    Chief Complaint: Hospital f/u (De La Cruz in place)     This is a 81 y.o.  female patient that is new to me.  The patient was referred to me by Hospital for urinary retention.  Has multiple medical problems including severe COPD as well as heart failure, CKD 4.  She was recently admitted to the hospital exacerbation of lower extremity edema/CHF/COPD at which point she received diuretics and required intermittent catheterization for large volume urine output.  Following that she had a indwelling De La Cruz catheter is placed is been indwelling since February 28th 2022. She is tolerating the catheter okay.  She denies any problems with urination prior to catheter placement.  She denies history of incomplete emptying, incontinence, hematuria, dysuria or recurrent urinary tract infections.      She reports recent changes to BP medications and BP borderline low for her 100/40 today, reports some mild lightheadedness and fatigue.       I personally reviewed the images: [facility] 2/2022: left AML, Right adrenal nodule, MRD, no hydronephrosis    LAST PSA  No results found for: PSA, PSADIAG, PSATOTAL, PSAFREE    Lab Results   Component Value Date    CREATININE 3.1 (H) 03/09/2022       ---  Past Medical History:   Diagnosis Date    Acute hypoxemic respiratory failure 12/19/2019    Acute right-sided thoracic back pain 12/09/2019    Allergy     Asthma     Basal cell carcinoma     left forehead    Basal cell carcinoma     left nose    Basal cell carcinoma 05/20/2015    right nose    Basal cell carcinoma 12/22/2015    left lower post neck    Basal cell carcinoma 12/03/2019    left ant scalp     Breast cancer     CAD (coronary artery disease)     Cardiomyopathy     Cardiomyopathy, ischemic     Cataract     CHF (congestive heart failure)     Chronic kidney disease, stage 3, mod decreased GFR 02/14/2017    Colon polyp 2011    Controlled type 2 diabetes  mellitus 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, with long-term current use of insulin 02/22/2018    COPD (chronic obstructive pulmonary disease)     COPD exacerbation 04/08/2018    Current mild episode of major depressive disorder without prior episode 01/25/2022    Defibrillator discharge     Diabetes mellitus     Diabetes mellitus type II     Diabetes with neurologic complications     Goiter     MNG    HX: breast cancer     Hyperlipidemia     Hypertension     Iron deficiency anemia 05/16/2017    Left kidney mass     Meningioma     Microalbuminuria due to type 2 diabetes mellitus 01/26/2022    Osteoporosis, postmenopausal     Pneumonia 12/08/2019    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    Proteinuria 1/21/2019    Pseudomonas pneumonia     Skin cancer     s/p excision    Sleep apnea     CPAP    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/2015    mid forehead    Unspecified vitamin D deficiency     Ventricular tachycardia     Vitamin B12 deficiency     Vitamin D deficiency disease        Past Surgical History:   Procedure Laterality Date    BASAL CELL CARCINOMA EXCISION      posterior neck and nose    BREAST BIOPSY      BREAST CYST EXCISION Left     BREAST SURGERY      CARDIAC DEFIBRILLATOR PLACEMENT      x 2    CATARACT EXTRACTION W/  INTRAOCULAR LENS IMPLANT Bilateral     CHOLECYSTECTOMY      COLONOSCOPY N/A 11/5/2019    Procedure: COLONOSCOPY;  Surgeon: Boaz Botello MD;  Location: [facility] ENDO ([address] [place] [address]);  Service: Endoscopy;  Laterality: N/A;  AICD - [employer] -     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    FRACTURE SURGERY      left elbow and wrist as a child    HYSTERECTOMY      MASTECTOMY Right     REPLACEMENT OF IMPLANTABLE CARDIOVERTER-DEFIBRILLATOR (ICD) GENERATOR N/A 12/17/2018    Procedure: REPLACEMENT, ICD GENERATOR;  Surgeon: Jan Mckeon MD;  Location: [facility] EP LAB;  Service: Cardiology;  Laterality: N/A;  DONNA, CRT-D gen  change, MDT, MAC, SK, 3 Prep    REVISION OF SKIN POCKET FOR CARDIOVERTER-DEFIBRILLATOR  12/17/2018    Procedure: Revision, Skin Pocket, For Cardioverter-Defibrillator;  Surgeon: Jan Mckeon MD;  Location: [facility] LAB;  Service: Cardiology;;    SQUAMOUS CELL CARCINOMA EXCISION      remved from forehead    TONSILLECTOMY         Family History   Problem Relation Age of Onset    Diabetes Father     Heart disease Father     Diabetes Sister     Heart disease Sister     Diabetes Brother     Heart disease Brother     Hypertension Brother     Diabetes Brother     Heart disease Brother     Hypertension Brother     Diabetes Brother     Heart disease Brother     Cancer Brother         colon    Diabetes Brother     Cancer Son         skin    Diabetes Son         prediabetes    Diabetes Daughter         prediabetes    Cancer Daughter         melanoma    Obesity Daughter     Melanoma Daughter     Diabetes Son     Asthma Mother     Hypertension Mother     Stroke Mother     No Known Problems Maternal Grandmother     No Known Problems Maternal Grandfather     No Known Problems Paternal Grandmother     No Known Problems Paternal Grandfather     Amblyopia Neg Hx     Blindness Neg Hx     Cataracts Neg Hx     Glaucoma Neg Hx     Macular degeneration Neg Hx     Retinal detachment Neg Hx     Strabismus Neg Hx     Thyroid disease Neg Hx        Social History     Tobacco Use    Smoking status: Never Smoker    Smokeless tobacco: Never Used   Substance Use Topics    Alcohol use: No     Alcohol/week: 0.0 standard drinks    Drug use: No       Current Outpatient Medications on File Prior to Visit   Medication Sig Dispense Refill    albuterol (PROVENTIL/VENTOLIN HFA) 90 mcg/actuation inhaler INHALE 2 PUFFS INTO THE LUNGS EVERY 6 (SIX) HOURS AS NEEDED FOR WHEEZING. RESCUE 18 g 12    albuterol-ipratropium (DUO-NEB) 2.5 mg-0.5 mg/3 mL nebulizer solution TAKE 3 MLS BY NEBULIZATION EVERY 4 (FOUR) HOURS AS  NEEDED FOR WHEEZING. 270 mL 12    ascorbic acid, vitamin C, (VITAMIN C) 100 MG tablet Take 100 mg by mouth once daily.      benzonatate (TESSALON) 100 MG capsule Take 1 capsule (100 mg total) by mouth 3 (three) times daily as needed for Cough. 20 capsule 0    blood sugar diagnostic (ACCU-CHEK HECTOR) Strp Uses Accu-Check Hector meter to test BG 4x/day 400 strip 6    carvediloL (COREG) 25 MG tablet TAKE 2 TABLETS (50 MG TOTAL) BY MOUTH 2 (TWO) TIMES DAILY. 360 tablet 3    cholecalciferol, vitamin D3, (VITAMIN D3) 50 mcg (2,000 unit) Tab Take 1 tablet by mouth once daily.       cloNIDine 0.1 mg/24 hr td ptwk (CATAPRES) 0.1 mg/24 hr Place 1 patch onto the skin every Tuesday. 12 patch 11    CYANOCOBALAMIN, VITAMIN B-12, (B-12 DOTS ORAL) Take 1 tablet by mouth once daily.      diltiaZEM (CARDIZEM CD) 240 MG 24 hr capsule Take 1 capsule (240 mg total) by mouth once daily. 30 capsule 11    FLUAD QUAD 2021-22,65Y UP,,PF, 60 mcg (15 mcg x 4)/0.5 mL Syrg       fluticasone propionate (FLONASE) 50 mcg/actuation nasal spray 2 sprays (100 mcg total) by Each Nostril route once daily. 16 g 12    fluticasone-salmeterol diskus inhaler 250-50 mcg Inhale 1 puff into the lungs 2 (two) times daily. Controller 180 each 3    furosemide (LASIX) 20 MG tablet Take 1 tablet (20 mg total) by mouth once daily. 90 tablet 2    hydrALAZINE (APRESOLINE) 100 MG tablet Take 1 tablet (100 mg total) by mouth 3 (three) times daily. 270 tablet 3    insulin (LANTUS SOLOSTAR U-100 INSULIN) glargine 100 units/mL (3mL) SubQ pen Inject into the skin.      irbesartan (AVAPRO) 300 MG tablet Take 1 tablet (300 mg total) by mouth every evening. 90 tablet 3    isosorbide mononitrate (IMDUR) 30 MG 24 hr tablet Take 1 tablet (30 mg total) by mouth once daily. 30 tablet 2    lancets (ACCU-CHEK SOFTCLIX LANCETS) Misc Uses Accu-Chek Hector meter to test BG 1x/day 400 each 3    levocetirizine (XYZAL) 5 MG tablet Take 5 mg by mouth every evening.       "magnesium oxide (MAG-OX) 400 mg tablet Take 1 tablet (400 mg total) by mouth once daily.  0    montelukast (SINGULAIR) 10 mg tablet Take 1 tablet (10 mg total) by mouth every evening. 90 tablet 3    mucus clearing device (ACAPELLA, FLUTTER) by Misc.(Non-Drug; Combo Route) route 4 (four) times daily as needed (to relieve mucous). 1 each 0    nitroGLYCERIN (NITROSTAT) 0.4 MG SL tablet Place 0.4 mg under the tongue every 5 (five) minutes as needed for Chest pain.       omega-3 fatty acids 500 mg Cap Take 1 capsule by mouth Twice daily.      pen needle, diabetic (BD ULTRA-FINE MINI PEN NEEDLE) 31 gauge x 3/16" Ndle USE WITH LANTUS AT BEDTIME 400 each 3    polyethylene glycol (GLYCOLAX) 17 gram PwPk Take 17 g by mouth daily as needed (constipation). 10 each 1    pravastatin (PRAVACHOL) 40 MG tablet Take 1 tablet (40 mg total) by mouth once daily. 90 tablet 3    pulse oximeter (PULSE OXIMETER) device by Apply Externally route 2 (two) times a day. Use twice daily at 8 AM and 3 PM and record the value in [employer] as directed. 1 each 0    sodium chloride 3% 3 % nebulizer solution TAKE 4 MLS BY NEBULIZATION 4 (FOUR) TIMES DAILY AS NEEDED FOR OTHER OR COUGH (TO INDUCE SPUTUM AND CLEAR MUCOUS.). 750 mL 11    tiotropium (SPIRIVA) 18 mcg inhalation capsule Inhale 1 capsule (18 mcg total) into the lungs once daily. Controller 90 capsule 3    VENOFER 100 mg iron/5 mL injection        No current facility-administered medications on file prior to visit.       Review of patient's allergies indicates:   Allergen Reactions    Iodine and iodide containing products Hives    Nifedipine      weakness       Review of Systems   Constitutional: Negative for activity change, chills, fatigue and fever.   Respiratory: Positive for shortness of breath. Negative for cough and chest tightness.    Cardiovascular: Negative for chest pain.   Gastrointestinal: Negative for abdominal distention, abdominal pain, nausea and vomiting. "   Genitourinary: Positive for difficulty urinating. Negative for flank pain, hematuria and pelvic pain.   Musculoskeletal: Negative for back pain and gait problem.       Objective:      Physical Exam  HENT:      Head: Normocephalic.   Cardiovascular:      Pulses: Normal pulses.   Pulmonary:      Effort: Pulmonary effort is normal.   Abdominal:      General: Abdomen is flat. There is no distension.      Palpations: Abdomen is soft.      Tenderness: There is no abdominal tenderness. There is no right CVA tenderness, left CVA tenderness or guarding.   Genitourinary:     Comments: De La Cruz clear  Musculoskeletal:      Cervical back: Normal range of motion.   Skin:     General: Skin is warm.   Neurological:      General: No focal deficit present.      Mental Status: She is alert.         Assessment:     Problem Noted   Urinary Retention 2/28/2022    82 yo F with urinary retention.  De La Cruz placed 2/28/22.      Ckd (Chronic Kidney Disease) Stage 4, Gfr 15-29 Ml/Min 7/27/2020   Acute Hyponatremia (Resolved) 2/24/2022   Metabolic Acidosis (Resolved) 2/24/2022   Acute Renal Failure Superimposed On Stage 4 Chronic Kidney Disease (Resolved) 12/7/2019   Proteinuria (Resolved) 1/21/2019       Plan:     1. No Flomax given recent changes to blood pressure medications and lightheadedness.  2. Follow-up next week for void trial early AM appt  3. Discussed if fails void trial would need intermittent catheterization.    Steffen Osborn MD

## 2022-03-14 ENCOUNTER — TELEPHONE (OUTPATIENT)
Dept: CARDIOLOGY | Facility: CLINIC | Age: 82
End: 2022-03-14
Payer: MEDICARE

## 2022-03-14 NOTE — TELEPHONE ENCOUNTER
"[facility] weekly phone follow up / triage call completed.     [facility] RN Navigator spoke with pt    Current Patient reported weight: 135lbs    Pt reports the following:  []  Shortness of Breath with Activity  []  Shortness of Breath at rest   []  Fatigue  [x]  Edema  : minimal swelling in ankles by end of day, resolves with elevation  [] Chest pain or tightness  [] Weight Increase since discharge  [] None of the above    Pt reports using "Daily weight and symptom tracker".     Pt reports being in the GREEN (color) Zone. If in yellow/red, reminded that they should be calling [facility] today or now.     Medications:    Medication compliance reviewed with pt.  Pt reports having medication list available and has all medications at home for use per list.     Education:   Confirmed pt still has "Home Care Guide for Heart Failure Patients" .     Reminded of key points as listed below.      Recommend 2 -3 gram sodium restriction and 1500 cc-2000 cc fluid restriction.   Encourage physical activity with graded exercise program.   Requested patient to weigh themselves daily, and to notify us if their weight increases by more than 3 lbs in 1 day or 5 lbs in 3 days.   o Reminded to use "Daily weight and symptom tracker".  Even if pt does not have a scale, to use symptom tracker.       Watch for these Signs and Symptoms: If any of these occur, contact [facility] immediately:   Increase in shortness of breath with movement   Increase in swelling in your legs and ankles   Weight gain of more than 3 pounds in a day or 5 pounds in 3 days.   Difficulty breathing when you are lying down   Worsening fatigue or tiredness   Stomach bloating, a full feeling or a loss of appetite   Increased coughing--especially when you are lying down      Pt was able to verbalize back to RN in their own words correct diet/fluid restrictions, necessity for exercise, warning signs and symptoms, when and how to contact " their [facility] team.      Pt reminded of upcoming appointment.  PT reports they will attend.       Pt reminded of how and when to contact [facility]:  [phone] (Mon-Fri, 8a-5p) & for urgent issues on the weekend to page the Heart Transplant MD on call.  Pt also encouraged utilize [facility] messaging as well.      Pt  verbalized understanding and in agreement of plan.       Will follow up with pt at next clinic visit and RN navigator available for pt questions, issues or concerns.

## 2022-03-15 ENCOUNTER — TELEPHONE (OUTPATIENT)
Dept: CARDIOLOGY | Facility: CLINIC | Age: 82
End: 2022-03-15
Payer: MEDICARE

## 2022-03-15 NOTE — TELEPHONE ENCOUNTER
"Received call from EULOGIO Alexis.  PT was feeling "funny".  BP was running 110/42 and repeat was about the same.  HR 56.  Pt reports feeling dizzy and tired.  Reiterated to RN    [facility]) weekly phone follow up / triage call completed.     Pt reports the following:  []  Shortness of Breath with Activity  []  Shortness of Breath at rest   []  Fatigue  []  Edema   [] Chest pain or tightness  [] Weight Increase since discharge  [x] None of the above    Pt reports using "Daily weight and symptom tracker".     Pt reports being in the GREEN (color) Zone. If in yellow/red, reminded that they should be calling [facility] today or now.     Medications:    Medication compliance reviewed with pt.  Pt reports having medication list available and has all medications at home for use per list.     Education:   Confirmed pt still has "Home Care Guide for Heart Failure Patients" .     Reminded of key points as listed below.      Recommend 2 -3 gram sodium restriction and 1500 cc-2000 cc fluid restriction.   Encourage physical activity with graded exercise program.   Requested patient to weigh themselves daily, and to notify us if their weight increases by more than 3 lbs in 1 day or 5 lbs in 3 days.   o Reminded to use "Daily weight and symptom tracker".  Even if pt does not have a scale, to use symptom tracker.       Watch for these Signs and Symptoms: If any of these occur, contact [facility] immediately:   Increase in shortness of breath with movement   Increase in swelling in your legs and ankles   Weight gain of more than 3 pounds in a day or 5 pounds in 3 days.   Difficulty breathing when you are lying down   Worsening fatigue or tiredness   Stomach bloating, a full feeling or a loss of appetite   Increased coughing--especially when you are lying down      Pt was able to verbalize back to RN in their own words correct diet/fluid restrictions, necessity for exercise, warning signs and " symptoms, when and how to contact their [facility] team.      Pt reminded of upcoming appointment.  PT reports they will attend.       Pt reminded of how and when to contact [facility]:  [phone] (Mon-Fri, 8a-5p) & for urgent issues on the weekend to page the Heart Transplant MD on call.  Pt also encouraged utilize [facility] messaging as well.      Pt  verbalized understanding and in agreement of plan.       Will follow up with pt at next clinic visit and RN navigator available for pt questions, issues or concerns.

## 2022-03-16 ENCOUNTER — OFFICE VISIT (OUTPATIENT)
Dept: UROLOGY | Facility: CLINIC | Age: 82
End: 2022-03-16
Payer: MEDICARE

## 2022-03-16 VITALS
HEIGHT: 63 IN | DIASTOLIC BLOOD PRESSURE: 67 MMHG | HEART RATE: 78 BPM | BODY MASS INDEX: 24.98 KG/M2 | WEIGHT: 141 LBS | SYSTOLIC BLOOD PRESSURE: 176 MMHG

## 2022-03-16 DIAGNOSIS — R33.9 URINARY RETENTION: Primary | ICD-10-CM

## 2022-03-16 PROCEDURE — 99213 OFFICE O/P EST LOW 20 MIN: CPT | Mod: S$GLB,,, | Performed by: UROLOGY

## 2022-03-16 PROCEDURE — 99999 PR PBB SHADOW E&M-EST. PATIENT-LVL V: ICD-10-PCS | Mod: PBBFAC,,, | Performed by: UROLOGY

## 2022-03-16 PROCEDURE — 3077F SYST BP >= 140 MM HG: CPT | Mod: CPTII,S$GLB,, | Performed by: UROLOGY

## 2022-03-16 PROCEDURE — 1111F PR DISCHARGE MEDS RECONCILED W/ CURRENT OUTPATIENT MED LIST: ICD-10-PCS | Mod: CPTII,S$GLB,, | Performed by: UROLOGY

## 2022-03-16 PROCEDURE — 1159F PR MEDICATION LIST DOCUMENTED IN MEDICAL RECORD: ICD-10-PCS | Mod: CPTII,S$GLB,, | Performed by: UROLOGY

## 2022-03-16 PROCEDURE — 3288F PR FALLS RISK ASSESSMENT DOCUMENTED: ICD-10-PCS | Mod: CPTII,S$GLB,, | Performed by: UROLOGY

## 2022-03-16 PROCEDURE — 1126F PR PAIN SEVERITY QUANTIFIED, NO PAIN PRESENT: ICD-10-PCS | Mod: CPTII,S$GLB,, | Performed by: UROLOGY

## 2022-03-16 PROCEDURE — 1111F DSCHRG MED/CURRENT MED MERGE: CPT | Mod: CPTII,S$GLB,, | Performed by: UROLOGY

## 2022-03-16 PROCEDURE — 99999 PR PBB SHADOW E&M-EST. PATIENT-LVL V: CPT | Mod: PBBFAC,,, | Performed by: UROLOGY

## 2022-03-16 PROCEDURE — 3077F PR MOST RECENT SYSTOLIC BLOOD PRESSURE >= 140 MM HG: ICD-10-PCS | Mod: CPTII,S$GLB,, | Performed by: UROLOGY

## 2022-03-16 PROCEDURE — 1126F AMNT PAIN NOTED NONE PRSNT: CPT | Mod: CPTII,S$GLB,, | Performed by: UROLOGY

## 2022-03-16 PROCEDURE — 1160F PR REVIEW ALL MEDS BY PRESCRIBER/CLIN PHARMACIST DOCUMENTED: ICD-10-PCS | Mod: CPTII,S$GLB,, | Performed by: UROLOGY

## 2022-03-16 PROCEDURE — 1160F RVW MEDS BY RX/DR IN RCRD: CPT | Mod: CPTII,S$GLB,, | Performed by: UROLOGY

## 2022-03-16 PROCEDURE — 1101F PT FALLS ASSESS-DOCD LE1/YR: CPT | Mod: CPTII,S$GLB,, | Performed by: UROLOGY

## 2022-03-16 PROCEDURE — 99213 PR OFFICE/OUTPT VISIT, EST, LEVL III, 20-29 MIN: ICD-10-PCS | Mod: S$GLB,,, | Performed by: UROLOGY

## 2022-03-16 PROCEDURE — 1101F PR PT FALLS ASSESS DOC 0-1 FALLS W/OUT INJ PAST YR: ICD-10-PCS | Mod: CPTII,S$GLB,, | Performed by: UROLOGY

## 2022-03-16 PROCEDURE — 3078F PR MOST RECENT DIASTOLIC BLOOD PRESSURE < 80 MM HG: ICD-10-PCS | Mod: CPTII,S$GLB,, | Performed by: UROLOGY

## 2022-03-16 PROCEDURE — 3078F DIAST BP <80 MM HG: CPT | Mod: CPTII,S$GLB,, | Performed by: UROLOGY

## 2022-03-16 PROCEDURE — 3288F FALL RISK ASSESSMENT DOCD: CPT | Mod: CPTII,S$GLB,, | Performed by: UROLOGY

## 2022-03-16 PROCEDURE — 1159F MED LIST DOCD IN RCRD: CPT | Mod: CPTII,S$GLB,, | Performed by: UROLOGY

## 2022-03-16 RX ORDER — CEPHALEXIN 500 MG/1
500 CAPSULE ORAL EVERY 12 HOURS
Qty: 6 CAPSULE | Refills: 0 | Status: SHIPPED | OUTPATIENT
Start: 2022-03-16 | End: 2022-03-19

## 2022-03-16 NOTE — PROGRESS NOTES
Subjective:       Patient ID: Myesha Quinones is a 81 y.o. female.    Chief Complaint: void and trial     This is a 81 y.o.  female patient that is new to me.  The patient was referred to me by Hospital for urinary retention.  Has multiple medical problems including severe COPD as well as heart failure, CKD 4.  She was recently admitted to the hospital exacerbation of lower extremity edema/CHF/COPD at which point she received diuretics and required intermittent catheterization for large volume urine output.  Following that she had a indwelling Lauren catheter is placed is been indwelling since February 28th 2022. She is tolerating the catheter okay.  She denies history of incomplete emptying, incontinence, hematuria, dysuria or recurrent urinary tract infections.      3/16/22: here for void trial but having some hematuria, lauren is not secured.       I personally reviewed the images: [facility] 2/2022: left AML, Right adrenal nodule, MRD, no hydronephrosis      Lab Results   Component Value Date    CREATININE 3.1 (H) 03/09/2022       ---  Past Medical History:   Diagnosis Date    Acute hypoxemic respiratory failure 12/19/2019    Acute right-sided thoracic back pain 12/09/2019    Allergy     Asthma     Basal cell carcinoma     left forehead    Basal cell carcinoma     left nose    Basal cell carcinoma 05/20/2015    right nose    Basal cell carcinoma 12/22/2015    left lower post neck    Basal cell carcinoma 12/03/2019    left ant scalp     Breast cancer     CAD (coronary artery disease)     Cardiomyopathy     Cardiomyopathy, ischemic     Cataract     CHF (congestive heart failure)     Chronic kidney disease, stage 3, mod decreased GFR 02/14/2017    Colon polyp 2011    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, with long-term current use of insulin 02/22/2018    COPD (chronic obstructive pulmonary disease)     COPD exacerbation  04/08/2018    Current mild episode of major depressive disorder without prior episode 01/25/2022    Defibrillator discharge     Diabetes mellitus     Diabetes mellitus type II     Diabetes with neurologic complications     Goiter     MNG    HX: breast cancer     Hyperlipidemia     Hypertension     Iron deficiency anemia 05/16/2017    Left kidney mass     Meningioma     Microalbuminuria due to type 2 diabetes mellitus 01/26/2022    Osteoporosis, postmenopausal     Pneumonia 12/08/2019    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    Proteinuria 1/21/2019    Pseudomonas pneumonia     Skin cancer     s/p excision    Sleep apnea     CPAP    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/2015    mid forehead    Unspecified vitamin D deficiency     Ventricular tachycardia     Vitamin B12 deficiency     Vitamin D deficiency disease        Past Surgical History:   Procedure Laterality Date    BASAL CELL CARCINOMA EXCISION      posterior neck and nose    BREAST BIOPSY      BREAST CYST EXCISION Left     BREAST SURGERY      CARDIAC DEFIBRILLATOR PLACEMENT      x 2    CATARACT EXTRACTION W/  INTRAOCULAR LENS IMPLANT Bilateral     CHOLECYSTECTOMY      COLONOSCOPY N/A 11/5/2019    Procedure: COLONOSCOPY;  Surgeon: Boaz Botello MD;  [facility]  Service: Endoscopy;  Laterality: N/A;  [facility] [employer] -     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    FRACTURE SURGERY      left elbow and wrist as a child    HYSTERECTOMY      MASTECTOMY Right     REPLACEMENT OF IMPLANTABLE CARDIOVERTER-DEFIBRILLATOR (ICD) GENERATOR N/A 12/17/2018    Procedure: REPLACEMENT, ICD GENERATOR;  Surgeon: Jan Mckeon MD;  Location: [facility] EP LAB;  Service: Cardiology;  Laterality: N/A;  DONNA, CRT-D gen change, MDT, MAC, SK, 3 Prep    REVISION OF SKIN POCKET FOR CARDIOVERTER-DEFIBRILLATOR  12/17/2018    Procedure: Revision, Skin Pocket, For Cardioverter-Defibrillator;  Surgeon: Jan Mckeon MD;  Location:  [facility] LAB;  Service: Cardiology;;    SQUAMOUS CELL CARCINOMA EXCISION      remved from forehead    TONSILLECTOMY         Family History   Problem Relation Age of Onset    Diabetes Father     Heart disease Father     Diabetes Sister     Heart disease Sister     Diabetes Brother     Heart disease Brother     Hypertension Brother     Diabetes Brother     Heart disease Brother     Hypertension Brother     Diabetes Brother     Heart disease Brother     Cancer Brother         colon    Diabetes Brother     Cancer Son         skin    Diabetes Son         prediabetes    Diabetes Daughter         prediabetes    Cancer Daughter         melanoma    Obesity Daughter     Melanoma Daughter     Diabetes Son     Asthma Mother     Hypertension Mother     Stroke Mother     No Known Problems Maternal Grandmother     No Known Problems Maternal Grandfather     No Known Problems Paternal Grandmother     No Known Problems Paternal Grandfather     Amblyopia Neg Hx     Blindness Neg Hx     Cataracts Neg Hx     Glaucoma Neg Hx     Macular degeneration Neg Hx     Retinal detachment Neg Hx     Strabismus Neg Hx     Thyroid disease Neg Hx        Social History     Tobacco Use    Smoking status: Never Smoker    Smokeless tobacco: Never Used   Substance Use Topics    Alcohol use: No     Alcohol/week: 0.0 standard drinks    Drug use: No       Current Outpatient Medications on File Prior to Visit   Medication Sig Dispense Refill    albuterol (PROVENTIL/VENTOLIN HFA) 90 mcg/actuation inhaler INHALE 2 PUFFS INTO THE LUNGS EVERY 6 (SIX) HOURS AS NEEDED FOR WHEEZING. RESCUE 18 g 12    albuterol-ipratropium (DUO-NEB) 2.5 mg-0.5 mg/3 mL nebulizer solution TAKE 3 MLS BY NEBULIZATION EVERY 4 (FOUR) HOURS AS NEEDED FOR WHEEZING. 270 mL 12    ascorbic acid, vitamin C, (VITAMIN C) 100 MG tablet Take 100 mg by mouth once daily.      benzonatate (TESSALON) 100 MG capsule Take 1 capsule (100 mg total) by mouth 3  (three) times daily as needed for Cough. 20 capsule 0    blood sugar diagnostic (ACCU-CHEK HECTOR) Strp Uses Accu-Check Hector meter to test BG 4x/day 400 strip 6    carvediloL (COREG) 25 MG tablet TAKE 2 TABLETS (50 MG TOTAL) BY MOUTH 2 (TWO) TIMES DAILY. 360 tablet 3    cholecalciferol, vitamin D3, (VITAMIN D3) 50 mcg (2,000 unit) Tab Take 1 tablet by mouth once daily.       cloNIDine 0.1 mg/24 hr td ptwk (CATAPRES) 0.1 mg/24 hr Place 1 patch onto the skin every Tuesday. 12 patch 11    CYANOCOBALAMIN, VITAMIN B-12, (B-12 DOTS ORAL) Take 1 tablet by mouth once daily.      diltiaZEM (CARDIZEM CD) 240 MG 24 hr capsule Take 1 capsule (240 mg total) by mouth once daily. 30 capsule 11    FLUAD QUAD 2021-22,65Y UP,,PF, 60 mcg (15 mcg x 4)/0.5 mL Syrg       fluticasone propionate (FLONASE) 50 mcg/actuation nasal spray 2 sprays (100 mcg total) by Each Nostril route once daily. 16 g 12    fluticasone-salmeterol diskus inhaler 250-50 mcg Inhale 1 puff into the lungs 2 (two) times daily. Controller 180 each 3    furosemide (LASIX) 20 MG tablet Take 1 tablet (20 mg total) by mouth once daily. 90 tablet 2    hydrALAZINE (APRESOLINE) 100 MG tablet Take 1 tablet (100 mg total) by mouth 3 (three) times daily. 270 tablet 3    insulin (LANTUS SOLOSTAR U-100 INSULIN) glargine 100 units/mL (3mL) SubQ pen Inject into the skin.      irbesartan (AVAPRO) 300 MG tablet Take 1 tablet (300 mg total) by mouth every evening. 90 tablet 3    isosorbide mononitrate (IMDUR) 30 MG 24 hr tablet Take 1 tablet (30 mg total) by mouth once daily. 30 tablet 2    lancets (ACCU-CHEK SOFTCLIX LANCETS) Misc Uses Accu-Chek Hector meter to test BG 1x/day 400 each 3    levocetirizine (XYZAL) 5 MG tablet Take 5 mg by mouth every evening.      magnesium oxide (MAG-OX) 400 mg tablet Take 1 tablet (400 mg total) by mouth once daily.  0    montelukast (SINGULAIR) 10 mg tablet Take 1 tablet (10 mg total) by mouth every evening. 90 tablet 3    mucus  "clearing device (ACAPELLA, FLUTTER) by Misc.(Non-Drug; Combo Route) route 4 (four) times daily as needed (to relieve mucous). 1 each 0    nitroGLYCERIN (NITROSTAT) 0.4 MG SL tablet Place 0.4 mg under the tongue every 5 (five) minutes as needed for Chest pain.       omega-3 fatty acids 500 mg Cap Take 1 capsule by mouth Twice daily.      pen needle, diabetic (BD ULTRA-FINE MINI PEN NEEDLE) 31 gauge x 3/16" Ndle USE WITH LANTUS AT BEDTIME 400 each 3    polyethylene glycol (GLYCOLAX) 17 gram PwPk Take 17 g by mouth daily as needed (constipation). 10 each 1    pravastatin (PRAVACHOL) 40 MG tablet Take 1 tablet (40 mg total) by mouth once daily. 90 tablet 3    pulse oximeter (PULSE OXIMETER) device by Apply Externally route 2 (two) times a day. Use twice daily at 8 AM and 3 PM and record the value in MyChart as directed. 1 each 0    sodium chloride 3% 3 % nebulizer solution TAKE 4 MLS BY NEBULIZATION 4 (FOUR) TIMES DAILY AS NEEDED FOR OTHER OR COUGH (TO INDUCE SPUTUM AND CLEAR MUCOUS.). 750 mL 11    tiotropium (SPIRIVA) 18 mcg inhalation capsule Inhale 1 capsule (18 mcg total) into the lungs once daily. Controller 90 capsule 3    VENOFER 100 mg iron/5 mL injection        No current facility-administered medications on file prior to visit.       Review of patient's allergies indicates:   Allergen Reactions    Iodine and iodide containing products Hives    Nifedipine      weakness       Review of Systems   Constitutional: Negative for activity change, chills, fatigue and fever.   Respiratory: Positive for shortness of breath. Negative for cough and chest tightness.    Cardiovascular: Negative for chest pain.   Gastrointestinal: Negative for abdominal distention, abdominal pain, nausea and vomiting.   Genitourinary: Positive for difficulty urinating. Negative for flank pain, hematuria and pelvic pain.   Musculoskeletal: Negative for back pain and gait problem.       Objective:      Physical Exam  HENT:      Head: " Normocephalic.   Cardiovascular:      Pulses: Normal pulses.   Pulmonary:      Effort: Pulmonary effort is normal.   Abdominal:      General: Abdomen is flat. There is no distension.      Palpations: Abdomen is soft.      Tenderness: There is no abdominal tenderness. There is no right CVA tenderness, left CVA tenderness or guarding.   Genitourinary:     Comments: De La Cruz clear slight red w/o clots  Musculoskeletal:      Cervical back: Normal range of motion.   Skin:     General: Skin is warm.   Neurological:      General: No focal deficit present.      Mental Status: She is alert.         Assessment:     Problem Noted   Urinary Retention 2/28/2022    80 yo F with urinary retention.  De La Cruz placed 2/28/22.      Ckd (Chronic Kidney Disease) Stage 4, Gfr 15-29 Ml/Min 7/27/2020     Plan:     1. Push fluids, 3 days antibiotic   2. Follow up Friday for void trial given hematuria    Steffen Osborn MD

## 2022-03-18 ENCOUNTER — TELEPHONE (OUTPATIENT)
Dept: PULMONOLOGY | Facility: CLINIC | Age: 82
End: 2022-03-18
Payer: MEDICARE

## 2022-03-18 ENCOUNTER — TELEPHONE (OUTPATIENT)
Dept: UROLOGY | Facility: CLINIC | Age: 82
End: 2022-03-18

## 2022-03-18 ENCOUNTER — OFFICE VISIT (OUTPATIENT)
Dept: UROLOGY | Facility: CLINIC | Age: 82
End: 2022-03-18
Payer: MEDICARE

## 2022-03-18 DIAGNOSIS — R33.9 URINARY RETENTION: Primary | ICD-10-CM

## 2022-03-18 DIAGNOSIS — J45.50 SEVERE PERSISTENT CHRONIC ASTHMA WITHOUT COMPLICATION: Primary | ICD-10-CM

## 2022-03-18 PROCEDURE — 99213 PR OFFICE/OUTPT VISIT, EST, LEVL III, 20-29 MIN: ICD-10-PCS | Mod: S$GLB,,, | Performed by: UROLOGY

## 2022-03-18 PROCEDURE — 1111F PR DISCHARGE MEDS RECONCILED W/ CURRENT OUTPATIENT MED LIST: ICD-10-PCS | Mod: CPTII,S$GLB,, | Performed by: UROLOGY

## 2022-03-18 PROCEDURE — 99999 PR PBB SHADOW E&M-EST. PATIENT-LVL I: ICD-10-PCS | Mod: PBBFAC,,, | Performed by: UROLOGY

## 2022-03-18 PROCEDURE — 1111F DSCHRG MED/CURRENT MED MERGE: CPT | Mod: CPTII,S$GLB,, | Performed by: UROLOGY

## 2022-03-18 PROCEDURE — 1160F PR REVIEW ALL MEDS BY PRESCRIBER/CLIN PHARMACIST DOCUMENTED: ICD-10-PCS | Mod: CPTII,S$GLB,, | Performed by: UROLOGY

## 2022-03-18 PROCEDURE — 1160F RVW MEDS BY RX/DR IN RCRD: CPT | Mod: CPTII,S$GLB,, | Performed by: UROLOGY

## 2022-03-18 PROCEDURE — 1159F MED LIST DOCD IN RCRD: CPT | Mod: CPTII,S$GLB,, | Performed by: UROLOGY

## 2022-03-18 PROCEDURE — 99999 PR PBB SHADOW E&M-EST. PATIENT-LVL I: CPT | Mod: PBBFAC,,, | Performed by: UROLOGY

## 2022-03-18 PROCEDURE — 1159F PR MEDICATION LIST DOCUMENTED IN MEDICAL RECORD: ICD-10-PCS | Mod: CPTII,S$GLB,, | Performed by: UROLOGY

## 2022-03-18 PROCEDURE — 99213 OFFICE O/P EST LOW 20 MIN: CPT | Mod: S$GLB,,, | Performed by: UROLOGY

## 2022-03-18 NOTE — TELEPHONE ENCOUNTER
I spoke with the patient today by phone.  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  IgE levels were obtained via [facility] and revealed a level of 2444ku/L.  Chest imaging is consistent with mucous plugging consistent with ABPA.  Known diagnosis of severe asthma (uncontrolled).  Aspergillus antigen ordered, but not yet obtained by patient vs pending report by [facility].  Eosinophils as high as 1.4k/uL (4/8/2018) and 0.6k/uL on 12/9/2020.    I am referring to allergy and immunology for further evaluation and/or treatment with biologic agents.  I have ordered aspergillus antigen, fungal immunodiffusion and allergen aspergillus fumagatus labs and encouraged the patient to obtain them ASAP.      Treatment would typically entail prolonged steroid therapy, but this would be sub-optimal given significant issues with heart failure and DMII.  Will refer to ID for consideration of antifungal therapy.  Will treat with PRN steroids for now.  She states she is breathing ok at this time and I encouraged her to contact me if she needs a PRN subscription for steroids.    Kelvin Maki MD  [facility]

## 2022-03-18 NOTE — TELEPHONE ENCOUNTER
Spoke with patient, she is urinating.  Experiencing some burning.  Advised to continue antibiotics.  Contact office if needed on next week.  She voiced understanding.  Follow up scheduled in two weeks.

## 2022-03-18 NOTE — PROGRESS NOTES
Subjective:       Patient ID: Myesha Quinones is a 81 y.o. female.    Chief Complaint: No chief complaint on file.     This is a 81 y.o.  female patient that is an established patient.   Has multiple medical problems including severe COPD as well as heart failure, CKD 4.  She was recently admitted to the hospital exacerbation of lower extremity edema/CHF/COPD at which point she received diuretics and required intermittent catheterization for large volume urine output.  Following that she had a indwelling Lauren catheter is placed is been indwelling since February 28th 2022. She is tolerating the catheter okay.  She denies history of incomplete emptying, incontinence, hematuria, dysuria or recurrent urinary tract infections.      3/16/22: here for void trial but having some hematuria, lauren is not secured.    3/18/22: here for void trial, urine cleared      I personally reviewed the images: [facility] 2/2022: left AML, Right adrenal nodule, MRD, no hydronephrosis      Lab Results   Component Value Date    CREATININE 3.1 (H) 03/09/2022       ---  Past Medical History:   Diagnosis Date    Acute hypoxemic respiratory failure 12/19/2019    Acute right-sided thoracic back pain 12/09/2019    Allergy     Asthma     Basal cell carcinoma     left forehead    Basal cell carcinoma     left nose    Basal cell carcinoma 05/20/2015    right nose    Basal cell carcinoma 12/22/2015    left lower post neck    Basal cell carcinoma 12/03/2019    left ant scalp     Breast cancer     CAD (coronary artery disease)     Cardiomyopathy     Cardiomyopathy, ischemic     Cataract     CHF (congestive heart failure)     Chronic kidney disease, stage 3, mod decreased GFR 02/14/2017    Colon polyp 2011    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, with long-term current use of insulin 02/22/2018    COPD (chronic obstructive pulmonary disease)     COPD  exacerbation 04/08/2018    Current mild episode of major depressive disorder without prior episode 01/25/2022    Defibrillator discharge     Diabetes mellitus     Diabetes mellitus type II     Diabetes with neurologic complications     Goiter     MNG    HX: breast cancer     Hyperlipidemia     Hypertension     Iron deficiency anemia 05/16/2017    Left kidney mass     Meningioma     Microalbuminuria due to type 2 diabetes mellitus 01/26/2022    Osteoporosis, postmenopausal     Pneumonia 12/08/2019    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    Proteinuria 1/21/2019    Pseudomonas pneumonia     Skin cancer     s/p excision    Sleep apnea     CPAP    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/2015    mid forehead    Unspecified vitamin D deficiency     Ventricular tachycardia     Vitamin B12 deficiency     Vitamin D deficiency disease        Past Surgical History:   Procedure Laterality Date    BASAL CELL CARCINOMA EXCISION      posterior neck and nose    BREAST BIOPSY      BREAST CYST EXCISION Left     BREAST SURGERY      CARDIAC DEFIBRILLATOR PLACEMENT      x 2    CATARACT EXTRACTION W/  INTRAOCULAR LENS IMPLANT Bilateral     CHOLECYSTECTOMY      COLONOSCOPY N/A 11/5/2019    Procedure: COLONOSCOPY;  Surgeon: Boaz Botello MD;  [facility]  Service: Endoscopy;  Laterality: N/A;  [facility] [employer] -     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    FRACTURE SURGERY      left elbow and wrist as a child    HYSTERECTOMY      MASTECTOMY Right     REPLACEMENT OF IMPLANTABLE CARDIOVERTER-DEFIBRILLATOR (ICD) GENERATOR N/A 12/17/2018    Procedure: REPLACEMENT, ICD GENERATOR;  Surgeon: Jan Mckeon MD;  Location: [facility] EP LAB;  Service: Cardiology;  Laterality: N/A;  DONNA, CRT-D gen change, MDT, MAC, SK, 3 Prep    REVISION OF SKIN POCKET FOR CARDIOVERTER-DEFIBRILLATOR  12/17/2018    Procedure: Revision, Skin Pocket, For Cardioverter-Defibrillator;  Surgeon: Jan Mckeon MD;   [facility] LAB;  Service: Cardiology;;    SQUAMOUS CELL CARCINOMA EXCISION      remved from forehead    TONSILLECTOMY         Family History   Problem Relation Age of Onset    Diabetes Father     Heart disease Father     Diabetes Sister     Heart disease Sister     Diabetes Brother     Heart disease Brother     Hypertension Brother     Diabetes Brother     Heart disease Brother     Hypertension Brother     Diabetes Brother     Heart disease Brother     Cancer Brother         colon    Diabetes Brother     Cancer Son         skin    Diabetes Son         prediabetes    Diabetes Daughter         prediabetes    Cancer Daughter         melanoma    Obesity Daughter     Melanoma Daughter     Diabetes Son     Asthma Mother     Hypertension Mother     Stroke Mother     No Known Problems Maternal Grandmother     No Known Problems Maternal Grandfather     No Known Problems Paternal Grandmother     No Known Problems Paternal Grandfather     Amblyopia Neg Hx     Blindness Neg Hx     Cataracts Neg Hx     Glaucoma Neg Hx     Macular degeneration Neg Hx     Retinal detachment Neg Hx     Strabismus Neg Hx     Thyroid disease Neg Hx        Social History     Tobacco Use    Smoking status: Never Smoker    Smokeless tobacco: Never Used   Substance Use Topics    Alcohol use: No     Alcohol/week: 0.0 standard drinks    Drug use: No       Current Outpatient Medications on File Prior to Visit   Medication Sig Dispense Refill    albuterol (PROVENTIL/VENTOLIN HFA) 90 mcg/actuation inhaler INHALE 2 PUFFS INTO THE LUNGS EVERY 6 (SIX) HOURS AS NEEDED FOR WHEEZING. RESCUE 18 g 12    albuterol-ipratropium (DUO-NEB) 2.5 mg-0.5 mg/3 mL nebulizer solution TAKE 3 MLS BY NEBULIZATION EVERY 4 (FOUR) HOURS AS NEEDED FOR WHEEZING. 270 mL 12    ascorbic acid, vitamin C, (VITAMIN C) 100 MG tablet Take 100 mg by mouth once daily.      benzonatate (TESSALON) 100 MG capsule Take 1 capsule (100 mg total) by  mouth 3 (three) times daily as needed for Cough. 20 capsule 0    blood sugar diagnostic (ACCU-CHEK HECTOR) Strp Uses Accu-Check Hector meter to test BG 4x/day 400 strip 6    carvediloL (COREG) 25 MG tablet TAKE 2 TABLETS (50 MG TOTAL) BY MOUTH 2 (TWO) TIMES DAILY. 360 tablet 3    cephALEXin (KEFLEX) 500 MG capsule Take 1 capsule (500 mg total) by mouth every 12 (twelve) hours. for 3 days 6 capsule 0    cholecalciferol, vitamin D3, (VITAMIN D3) 50 mcg (2,000 unit) Tab Take 1 tablet by mouth once daily.       cloNIDine 0.1 mg/24 hr td ptwk (CATAPRES) 0.1 mg/24 hr Place 1 patch onto the skin every Tuesday. 12 patch 11    CYANOCOBALAMIN, VITAMIN B-12, (B-12 DOTS ORAL) Take 1 tablet by mouth once daily.      diltiaZEM (CARDIZEM CD) 240 MG 24 hr capsule Take 1 capsule (240 mg total) by mouth once daily. 30 capsule 11    FLUAD QUAD 2021-22,65Y UP,,PF, 60 mcg (15 mcg x 4)/0.5 mL Syrg       fluticasone propionate (FLONASE) 50 mcg/actuation nasal spray 2 sprays (100 mcg total) by Each Nostril route once daily. 16 g 12    fluticasone-salmeterol diskus inhaler 250-50 mcg Inhale 1 puff into the lungs 2 (two) times daily. Controller 180 each 3    furosemide (LASIX) 20 MG tablet Take 1 tablet (20 mg total) by mouth once daily. 90 tablet 2    hydrALAZINE (APRESOLINE) 100 MG tablet Take 1 tablet (100 mg total) by mouth 3 (three) times daily. 270 tablet 3    insulin (LANTUS SOLOSTAR U-100 INSULIN) glargine 100 units/mL (3mL) SubQ pen Inject into the skin.      irbesartan (AVAPRO) 300 MG tablet Take 1 tablet (300 mg total) by mouth every evening. 90 tablet 3    isosorbide mononitrate (IMDUR) 30 MG 24 hr tablet Take 1 tablet (30 mg total) by mouth once daily. 30 tablet 2    lancets (ACCU-CHEK SOFTCLIX LANCETS) Misc Uses Accu-Chek Hector meter to test BG 1x/day 400 each 3    levocetirizine (XYZAL) 5 MG tablet Take 5 mg by mouth every evening.      magnesium oxide (MAG-OX) 400 mg tablet Take 1 tablet (400 mg total) by  "mouth once daily.  0    montelukast (SINGULAIR) 10 mg tablet Take 1 tablet (10 mg total) by mouth every evening. 90 tablet 3    mucus clearing device (ACAPELLA, FLUTTER) by Misc.(Non-Drug; Combo Route) route 4 (four) times daily as needed (to relieve mucous). 1 each 0    nitroGLYCERIN (NITROSTAT) 0.4 MG SL tablet Place 0.4 mg under the tongue every 5 (five) minutes as needed for Chest pain.       omega-3 fatty acids 500 mg Cap Take 1 capsule by mouth Twice daily.      pen needle, diabetic (BD ULTRA-FINE MINI PEN NEEDLE) 31 gauge x 3/16" Ndle USE WITH LANTUS AT BEDTIME 400 each 3    polyethylene glycol (GLYCOLAX) 17 gram PwPk Take 17 g by mouth daily as needed (constipation). 10 each 1    pravastatin (PRAVACHOL) 40 MG tablet Take 1 tablet (40 mg total) by mouth once daily. 90 tablet 3    pulse oximeter (PULSE OXIMETER) device by Apply Externally route 2 (two) times a day. Use twice daily at 8 AM and 3 PM and record the value in "[employer] as directed. 1 each 0    sodium chloride 3% 3 % nebulizer solution TAKE 4 MLS BY NEBULIZATION 4 (FOUR) TIMES DAILY AS NEEDED FOR OTHER OR COUGH (TO INDUCE SPUTUM AND CLEAR MUCOUS.). 750 mL 11    tiotropium (SPIRIVA) 18 mcg inhalation capsule Inhale 1 capsule (18 mcg total) into the lungs once daily. Controller 90 capsule 3    VENOFER 100 mg iron/5 mL injection        No current facility-administered medications on file prior to visit.       Review of patient's allergies indicates:   Allergen Reactions    Iodine and iodide containing products Hives    Nifedipine      weakness       Review of Systems   Constitutional: Negative for activity change, chills, fatigue and fever.   Respiratory: Positive for shortness of breath. Negative for cough and chest tightness.    Cardiovascular: Negative for chest pain.   Gastrointestinal: Negative for abdominal distention, abdominal pain, nausea and vomiting.   Genitourinary: Positive for difficulty urinating. Negative for flank pain, " hematuria and pelvic pain.   Musculoskeletal: Negative for back pain and gait problem.       Objective:      Physical Exam  HENT:      Head: Normocephalic.   Cardiovascular:      Pulses: Normal pulses.   Pulmonary:      Effort: Pulmonary effort is normal.   Abdominal:      General: Abdomen is flat. There is no distension.      Palpations: Abdomen is soft.      Tenderness: There is no abdominal tenderness. There is no right CVA tenderness, left CVA tenderness or guarding.   Genitourinary:     Comments: De La Cruz clear   Musculoskeletal:      Cervical back: Normal range of motion.   Skin:     General: Skin is warm.   Neurological:      General: No focal deficit present.      Mental Status: She is alert.         Assessment:     Problem Noted   Urinary Retention 2/28/2022    82 yo F with urinary retention.  De La Cruz placed 2/28/22.      Ckd (Chronic Kidney Disease) Stage 4, Gfr 15-29 Ml/Min 7/27/2020     Plan:     1. Void trial   2. Follow up this PM if unable to void  3. If able to void follow up in 2 weeks with [facility].      Steffen Osborn MD

## 2022-03-18 NOTE — TELEPHONE ENCOUNTER
----- Message from Celeste Read sent at 3/18/2022  2:07 PM CDT -----  Needs advice from nurse:      Who Called:pt  Regarding:patient was seen today to have catheter removed and she is having issues  Would the patient rather a call back or VIA [employer]?  Best Call Back number:[phone]  Additional Info:

## 2022-03-18 NOTE — TELEPHONE ENCOUNTER
I spoke to patient's son, Macho. He would like to speak to Dr Maki about Dr Maki's conversation with his mother. Mr Macho stated he is trying to understand with is going on. I let him know I would send a message to Dr Maki. Macho verbalized understanding.

## 2022-03-18 NOTE — TELEPHONE ENCOUNTER
I spoke to patient. I scheduled ID appointment with Dr Sheppard on 3/30/22 at 2:30pm and allergy appointment on 4/27/22 at 10am with Dr Canela referred by Dr Maki. Appointments mailed. Patient confirmed and verbalized understanding.

## 2022-03-18 NOTE — TELEPHONE ENCOUNTER
----- Message from Kirsten Esparza sent at 3/18/2022 10:50 AM CDT -----  Contact: Macho  Pt son Macho requesting call back in regards to pt         Confirmed patient's contact info below:  Contact Name: Macho  Phone Number: [phone]       Jozef Toledo(Attending)

## 2022-03-21 ENCOUNTER — TELEPHONE (OUTPATIENT)
Dept: PULMONOLOGY | Facility: CLINIC | Age: 82
End: 2022-03-21
Payer: MEDICARE

## 2022-03-21 ENCOUNTER — LAB VISIT (OUTPATIENT)
Dept: LAB | Facility: HOSPITAL | Age: 82
End: 2022-03-21
Payer: MEDICARE

## 2022-03-21 ENCOUNTER — EDUCATION (OUTPATIENT)
Dept: TRANSPLANT | Facility: CLINIC | Age: 82
End: 2022-03-21
Payer: MEDICARE

## 2022-03-21 ENCOUNTER — OFFICE VISIT (OUTPATIENT)
Dept: CARDIOLOGY | Facility: CLINIC | Age: 82
End: 2022-03-21
Payer: MEDICARE

## 2022-03-21 VITALS
WEIGHT: 143.13 LBS | SYSTOLIC BLOOD PRESSURE: 110 MMHG | HEART RATE: 55 BPM | DIASTOLIC BLOOD PRESSURE: 59 MMHG | HEIGHT: 63 IN | BODY MASS INDEX: 25.36 KG/M2

## 2022-03-21 DIAGNOSIS — I25.5 CARDIOMYOPATHY, ISCHEMIC: ICD-10-CM

## 2022-03-21 DIAGNOSIS — E11.59 HYPERTENSION ASSOCIATED WITH DIABETES: ICD-10-CM

## 2022-03-21 DIAGNOSIS — J96.01 ACUTE HYPOXEMIC RESPIRATORY FAILURE: ICD-10-CM

## 2022-03-21 DIAGNOSIS — I15.2 HYPERTENSION ASSOCIATED WITH DIABETES: ICD-10-CM

## 2022-03-21 DIAGNOSIS — E87.1 HYPONATREMIA: ICD-10-CM

## 2022-03-21 DIAGNOSIS — G47.33 OBSTRUCTIVE SLEEP APNEA: ICD-10-CM

## 2022-03-21 DIAGNOSIS — I50.42 CHRONIC COMBINED SYSTOLIC (CONGESTIVE) AND DIASTOLIC (CONGESTIVE) HEART FAILURE: Primary | ICD-10-CM

## 2022-03-21 DIAGNOSIS — Z79.4 TYPE 2 DIABETES MELLITUS WITH HYPERGLYCEMIA, WITH LONG-TERM CURRENT USE OF INSULIN: ICD-10-CM

## 2022-03-21 DIAGNOSIS — J42 CHRONIC BRONCHITIS, UNSPECIFIED CHRONIC BRONCHITIS TYPE: ICD-10-CM

## 2022-03-21 DIAGNOSIS — E11.65 TYPE 2 DIABETES MELLITUS WITH HYPERGLYCEMIA, WITH LONG-TERM CURRENT USE OF INSULIN: ICD-10-CM

## 2022-03-21 DIAGNOSIS — Z95.810 BIVENTRICULAR ICD (IMPLANTABLE CARDIOVERTER-DEFIBRILLATOR) IN PLACE: ICD-10-CM

## 2022-03-21 DIAGNOSIS — N18.4 CKD (CHRONIC KIDNEY DISEASE) STAGE 4, GFR 15-29 ML/MIN: ICD-10-CM

## 2022-03-21 LAB
ALBUMIN SERPL BCP-MCNC: 2.8 G/DL (ref 3.5–5.2)
ALP SERPL-CCNC: 113 U/L (ref 55–135)
ALT SERPL W/O P-5'-P-CCNC: 18 U/L (ref 10–44)
ANION GAP SERPL CALC-SCNC: 10 MMOL/L (ref 8–16)
AST SERPL-CCNC: 21 U/L (ref 10–40)
BILIRUB SERPL-MCNC: 0.3 MG/DL (ref 0.1–1)
BNP SERPL-MCNC: 260 PG/ML (ref 0–99)
BUN SERPL-MCNC: 56 MG/DL (ref 8–23)
CALCIUM SERPL-MCNC: 8.9 MG/DL (ref 8.7–10.5)
CHLORIDE SERPL-SCNC: 95 MMOL/L (ref 95–110)
CO2 SERPL-SCNC: 21 MMOL/L (ref 23–29)
CREAT SERPL-MCNC: 2.6 MG/DL (ref 0.5–1.4)
EST. GFR  (AFRICAN AMERICAN): 19.1 ML/MIN/1.73 M^2
EST. GFR  (NON AFRICAN AMERICAN): 16.6 ML/MIN/1.73 M^2
GLUCOSE SERPL-MCNC: 169 MG/DL (ref 70–110)
MAGNESIUM SERPL-MCNC: 2.2 MG/DL (ref 1.6–2.6)
PHOSPHATE SERPL-MCNC: 5 MG/DL (ref 2.7–4.5)
POTASSIUM SERPL-SCNC: 5 MMOL/L (ref 3.5–5.1)
PROT SERPL-MCNC: 6.2 G/DL (ref 6–8.4)
SODIUM SERPL-SCNC: 126 MMOL/L (ref 136–145)

## 2022-03-21 PROCEDURE — 80053 COMPREHEN METABOLIC PANEL: CPT

## 2022-03-21 PROCEDURE — 3078F PR MOST RECENT DIASTOLIC BLOOD PRESSURE < 80 MM HG: ICD-10-PCS | Mod: CPTII,S$GLB,,

## 2022-03-21 PROCEDURE — 99214 PR OFFICE/OUTPT VISIT, EST, LEVL IV, 30-39 MIN: ICD-10-PCS | Mod: S$GLB,,,

## 2022-03-21 PROCEDURE — 1126F AMNT PAIN NOTED NONE PRSNT: CPT | Mod: CPTII,S$GLB,,

## 2022-03-21 PROCEDURE — 3074F PR MOST RECENT SYSTOLIC BLOOD PRESSURE < 130 MM HG: ICD-10-PCS | Mod: CPTII,S$GLB,,

## 2022-03-21 PROCEDURE — 1159F PR MEDICATION LIST DOCUMENTED IN MEDICAL RECORD: ICD-10-PCS | Mod: CPTII,S$GLB,,

## 2022-03-21 PROCEDURE — 99214 OFFICE O/P EST MOD 30 MIN: CPT | Mod: S$GLB,,,

## 2022-03-21 PROCEDURE — 3074F SYST BP LT 130 MM HG: CPT | Mod: CPTII,S$GLB,,

## 2022-03-21 PROCEDURE — 1159F MED LIST DOCD IN RCRD: CPT | Mod: CPTII,S$GLB,,

## 2022-03-21 PROCEDURE — 83735 ASSAY OF MAGNESIUM: CPT

## 2022-03-21 PROCEDURE — 1126F PR PAIN SEVERITY QUANTIFIED, NO PAIN PRESENT: ICD-10-PCS | Mod: CPTII,S$GLB,,

## 2022-03-21 PROCEDURE — 84100 ASSAY OF PHOSPHORUS: CPT

## 2022-03-21 PROCEDURE — 3288F FALL RISK ASSESSMENT DOCD: CPT | Mod: CPTII,S$GLB,,

## 2022-03-21 PROCEDURE — 1160F RVW MEDS BY RX/DR IN RCRD: CPT | Mod: CPTII,S$GLB,,

## 2022-03-21 PROCEDURE — 1101F PT FALLS ASSESS-DOCD LE1/YR: CPT | Mod: CPTII,S$GLB,,

## 2022-03-21 PROCEDURE — 3078F DIAST BP <80 MM HG: CPT | Mod: CPTII,S$GLB,,

## 2022-03-21 PROCEDURE — 1101F PR PT FALLS ASSESS DOC 0-1 FALLS W/OUT INJ PAST YR: ICD-10-PCS | Mod: CPTII,S$GLB,,

## 2022-03-21 PROCEDURE — 1111F DSCHRG MED/CURRENT MED MERGE: CPT | Mod: CPTII,S$GLB,,

## 2022-03-21 PROCEDURE — 99999 PR PBB SHADOW E&M-EST. PATIENT-LVL V: CPT | Mod: PBBFAC,,,

## 2022-03-21 PROCEDURE — 1111F PR DISCHARGE MEDS RECONCILED W/ CURRENT OUTPATIENT MED LIST: ICD-10-PCS | Mod: CPTII,S$GLB,,

## 2022-03-21 PROCEDURE — 83880 ASSAY OF NATRIURETIC PEPTIDE: CPT

## 2022-03-21 PROCEDURE — 1160F PR REVIEW ALL MEDS BY PRESCRIBER/CLIN PHARMACIST DOCUMENTED: ICD-10-PCS | Mod: CPTII,S$GLB,,

## 2022-03-21 PROCEDURE — 3288F PR FALLS RISK ASSESSMENT DOCUMENTED: ICD-10-PCS | Mod: CPTII,S$GLB,,

## 2022-03-21 PROCEDURE — 99999 PR PBB SHADOW E&M-EST. PATIENT-LVL V: ICD-10-PCS | Mod: PBBFAC,,,

## 2022-03-21 RX ORDER — FUROSEMIDE 20 MG/1
20 TABLET ORAL DAILY
Qty: 90 TABLET | Refills: 2
Start: 2022-03-21 | End: 2022-04-18 | Stop reason: SDUPTHER

## 2022-03-21 NOTE — TELEPHONE ENCOUNTER
Dr Maki: I spoke to Beena. She stated tomorrow will make a week they have been holding the blood work and [employer] is giving them a hard time. Beena stated they're not sure what to do going forward with orders for this aspergillus test and do not have the correct codes. Can you please give them a call at [phone]. Please ask for Vanesa.

## 2022-03-21 NOTE — PROGRESS NOTES
Met with pt to discuss 2g Na diet and 1500 ml restriction     Pt states decent sierra, no n/v/d/c, no problem chew/swallow    Pt's son reports cooking most of pt's meal's meals. Pt will also get congregate meals/ meals on wheels. Meals the family cooks is a meat, starch, and vegetable that's fresh and cooked with Mrs Dickerson. Pt only makes one meal and it is corn flakes and milk. Pt is strict on fluid restriction and limits to 2 water bottles per day to allow for milk and soft drink consumption.     Pt was instructed to keep meals around 600mg per meal and cont to restrict fluid to 1500ml . Gave pt nutritional handout and contact info if needs arise. Will follow up with pt as needed in [facility].

## 2022-03-21 NOTE — PATIENT INSTRUCTIONS
Take an additional 20mg lasix (40mg total) in the morning for the next 2 days until weight returns to 135lbs

## 2022-03-21 NOTE — PROGRESS NOTES
"[facility] Provider Note (Follow-up) Consult Note      HPI:     Patient can walk from parking garage to clinic today and pace normal before SOB, assisted by rolling walker. Denies worsenig SOB, reports SOB continues to be improved from hospital discharge              Patient sleeps on 1 number of pillows with CPAP              Patient wakes up SOB, has to get out of bed, associated cough- denies, reports cough improved since last visit although still productive for clear-yellowish sputum              Palpitations - denies              Dizzy, light-headed, pre-syncope or syncope- denies, reports feeling "spacey" after taking morning imdur. Denies pre-syncope/syncope/falls              Since discharge frequency of performing weights, home weight and weight change- performing daily weights, weight previously stable 134-135lbs. 137lbs today              Other information felt pertinent to HPI: Ms. Myesha Quinones is a 81 y.o. female with a PMHx of CAD, ischemic cardiomyopathy with LBBB s/p biventricular ICD, CHF, CKD4, IDDM, breast cancer, HTN, asthma, COPD, and HLD who presents to second [facility] visit accompanied by son. Mrs. Quinones was admitted to observation for CHF and COPD exacerbation. Started on Lasix IV 20 mg BID and scheduled duonebs, prednisone, and antibiotics. Diuresed well, net neg 2L, however her Cr continues to increase. Suspect over diuresis. Will check urine studies, RP sono unremarkable. Give small IVF bolus. Nephrology consulted for further assistance, agree with over diuresis. Continue fluid restriction and T2DM control. BP continues to be uncontrolled, IMDUR added to regimen. 2/28 patient began having significant urinary retention, bladder scan reveals >1L. Start oxybutynin and monitor, failed voiding trial, lauren placed. HH established with cards, urology fu.      3/9/22: Today she reports breathing much improved since hospital stay. Still endorses continued cough productive for green sputum, but " "improved. She was discharged on lasix 20mg daily and has been restricting fluid intake to 2 16oz water bottles/day per nephrology recommendations for hyponatremia. Endorses chronic LE edema worsened with keeping feet dependent and improved after elevation.    3/21/22: Today she continues to endorse periodically feeling "spacey". Home BP typically in 120-130s/60-70s range with lower BP readings similar to clinic value today. She reports sensation of feeling "spacey" associated with lower BP readings. Otherwise she denies worsening SOB. She does report mildly worsened swelling since last visit, worsened throughout the day with keeping feet dependent.     PHYSICAL:   Vitals:    03/21/22 1326   BP: (!) 110/59   Pulse: (!) 55      Wt Readings from Last 3 Encounters:   03/21/22 64.9 kg (143 lb 1.6 oz)   03/16/22 64 kg (140 lb 15.8 oz)   03/11/22 64.9 kg (143 lb 1.3 oz)     JVD: yes, just above level of clavicle    Heart rhythm: regular  Cardiac murmur: No    S3: no  S4: no  Lungs: wheezing, diminished breath sounds throughout  Hepatojugular reflux: yes  Edema: yes, 3+ LLE edema, 2+ RLE edema      Lab Results   Component Value Date     (L) 03/21/2022    K 5.0 03/21/2022    MG 2.2 03/21/2022    CL 95 03/21/2022    CO2 21 (L) 03/21/2022    BUN 56 (H) 03/21/2022    CREATININE 2.6 (H) 03/21/2022     (H) 03/21/2022    CALCIUM 8.9 03/21/2022    AST 21 03/21/2022    ALT 18 03/21/2022    ALBUMIN 2.8 (L) 03/21/2022    PROT 6.2 03/21/2022    BILITOT 0.3 03/21/2022     Lab Results   Component Value Date     (H) 03/21/2022     (H) 03/09/2022     (H) 02/25/2022       ASSESSMENT: Chronic combined systolic and diastolic HF    PLAN:      Patient Instructions:    Instruct the patient to notify this clinic if HH, a physician or an advanced care provider wants to change medication one of their HF medications    Activity and Diet restrictions:   o Recommend 2-3 gram sodium restriction and 1500cc- 2000cc " fluid restriction.  o Encourage physical activity with graded exercise program.  o Requested patient to weigh themselves daily, and to notify us if their weight increases by more than 3 lbs in 1 day or 5 lbs in 3 days.    Assigned dry weight on home scale: 134-135lbs  Medication changes (include current dose and changed dose): NYHA Class II symptoms. Hold imdur 2/2 episodes of symptomatic hypotension. Continue lasix 20mg daily. Instructed to take an additional 20mg prn for worsening swelling/couple lb weight gain. Increased fluid volume on exam compared to last visit, provided instructions to take additional 20mg lasix for next couple days.    RD education provided during visit.    Upcoming labs and date anticipated: RTC in 2 weeks for repeat labs and final [facility] visit.    Li Lott PA-C

## 2022-03-21 NOTE — TELEPHONE ENCOUNTER
----- Message from Sohan Romero sent at 3/21/2022  9:50 AM CDT -----  Contact: Vanesa Alexis home health nurse calling In regards to lab orders, Requesting call back      [phone]       #PLEASE ASK FOR SEAN

## 2022-03-24 ENCOUNTER — TELEPHONE (OUTPATIENT)
Dept: ELECTROPHYSIOLOGY | Facility: CLINIC | Age: 82
End: 2022-03-24
Payer: MEDICARE

## 2022-03-24 NOTE — TELEPHONE ENCOUNTER
Device Type:  ICD   Date/Time:  03/04/2022 at 0945 fro 8 secs  Event Type:  MMVT  Ventricular CL:  188 bpm    [place]:   8 secs  Therapy Delivered:  ATP  Appropriate Therapy:  Yes  Successful:  Yes  Pt Symptomatic: Asymptomatic to event Pt states she was hospitalized at that time for HF, was diuresed then  then was dehydrated.  Pt has questions about Coreg and HF.     No hx of VT RFA  Coreg 25mg po 2 pills(50 mg) BID  Cardizem 240mg po daily  Last EF 45-50% 2/22/22   Last office visit 3/3/2021   next in clinic 5/11/2022      Reviewed with Dr. Mckeon. No new orders. Continue Coreg and pt can ask any HF questions with her HF physician.  Continue to monitor.  Pt wanted me to call her daughter Betsy and inform her of the event.  Spoke with her daughter Betsy at [phone].

## 2022-03-29 ENCOUNTER — OFFICE VISIT (OUTPATIENT)
Dept: INFECTIOUS DISEASES | Facility: CLINIC | Age: 82
End: 2022-03-29
Payer: MEDICARE

## 2022-03-29 ENCOUNTER — PATIENT MESSAGE (OUTPATIENT)
Dept: INFECTIOUS DISEASES | Facility: CLINIC | Age: 82
End: 2022-03-29

## 2022-03-29 ENCOUNTER — PATIENT MESSAGE (OUTPATIENT)
Dept: CARDIOLOGY | Facility: CLINIC | Age: 82
End: 2022-03-29
Payer: MEDICARE

## 2022-03-29 VITALS
WEIGHT: 145.06 LBS | DIASTOLIC BLOOD PRESSURE: 48 MMHG | HEIGHT: 63 IN | BODY MASS INDEX: 25.7 KG/M2 | HEART RATE: 59 BPM | TEMPERATURE: 98 F | SYSTOLIC BLOOD PRESSURE: 103 MMHG

## 2022-03-29 DIAGNOSIS — J45.50 SEVERE PERSISTENT CHRONIC ASTHMA WITHOUT COMPLICATION: ICD-10-CM

## 2022-03-29 DIAGNOSIS — I10 ESSENTIAL HYPERTENSION: ICD-10-CM

## 2022-03-29 DIAGNOSIS — B44.81 ABPA (ALLERGIC BRONCHOPULMONARY ASPERGILLOSIS): Primary | ICD-10-CM

## 2022-03-29 PROCEDURE — 1111F PR DISCHARGE MEDS RECONCILED W/ CURRENT OUTPATIENT MED LIST: ICD-10-PCS | Mod: CPTII,S$GLB,, | Performed by: INTERNAL MEDICINE

## 2022-03-29 PROCEDURE — 1126F AMNT PAIN NOTED NONE PRSNT: CPT | Mod: CPTII,S$GLB,, | Performed by: INTERNAL MEDICINE

## 2022-03-29 PROCEDURE — 1159F PR MEDICATION LIST DOCUMENTED IN MEDICAL RECORD: ICD-10-PCS | Mod: CPTII,S$GLB,, | Performed by: INTERNAL MEDICINE

## 2022-03-29 PROCEDURE — 3288F FALL RISK ASSESSMENT DOCD: CPT | Mod: CPTII,S$GLB,, | Performed by: INTERNAL MEDICINE

## 2022-03-29 PROCEDURE — 99999 PR PBB SHADOW E&M-EST. PATIENT-LVL V: CPT | Mod: PBBFAC,,, | Performed by: INTERNAL MEDICINE

## 2022-03-29 PROCEDURE — 1160F PR REVIEW ALL MEDS BY PRESCRIBER/CLIN PHARMACIST DOCUMENTED: ICD-10-PCS | Mod: CPTII,S$GLB,, | Performed by: INTERNAL MEDICINE

## 2022-03-29 PROCEDURE — 99214 OFFICE O/P EST MOD 30 MIN: CPT | Mod: S$GLB,,, | Performed by: INTERNAL MEDICINE

## 2022-03-29 PROCEDURE — 1160F RVW MEDS BY RX/DR IN RCRD: CPT | Mod: CPTII,S$GLB,, | Performed by: INTERNAL MEDICINE

## 2022-03-29 PROCEDURE — 1126F PR PAIN SEVERITY QUANTIFIED, NO PAIN PRESENT: ICD-10-PCS | Mod: CPTII,S$GLB,, | Performed by: INTERNAL MEDICINE

## 2022-03-29 PROCEDURE — 1101F PT FALLS ASSESS-DOCD LE1/YR: CPT | Mod: CPTII,S$GLB,, | Performed by: INTERNAL MEDICINE

## 2022-03-29 PROCEDURE — 99999 PR PBB SHADOW E&M-EST. PATIENT-LVL V: ICD-10-PCS | Mod: PBBFAC,,, | Performed by: INTERNAL MEDICINE

## 2022-03-29 PROCEDURE — 1159F MED LIST DOCD IN RCRD: CPT | Mod: CPTII,S$GLB,, | Performed by: INTERNAL MEDICINE

## 2022-03-29 PROCEDURE — 99214 PR OFFICE/OUTPT VISIT, EST, LEVL IV, 30-39 MIN: ICD-10-PCS | Mod: S$GLB,,, | Performed by: INTERNAL MEDICINE

## 2022-03-29 PROCEDURE — 3078F DIAST BP <80 MM HG: CPT | Mod: CPTII,S$GLB,, | Performed by: INTERNAL MEDICINE

## 2022-03-29 PROCEDURE — 3288F PR FALLS RISK ASSESSMENT DOCUMENTED: ICD-10-PCS | Mod: CPTII,S$GLB,, | Performed by: INTERNAL MEDICINE

## 2022-03-29 PROCEDURE — 1111F DSCHRG MED/CURRENT MED MERGE: CPT | Mod: CPTII,S$GLB,, | Performed by: INTERNAL MEDICINE

## 2022-03-29 PROCEDURE — 3078F PR MOST RECENT DIASTOLIC BLOOD PRESSURE < 80 MM HG: ICD-10-PCS | Mod: CPTII,S$GLB,, | Performed by: INTERNAL MEDICINE

## 2022-03-29 PROCEDURE — 3074F PR MOST RECENT SYSTOLIC BLOOD PRESSURE < 130 MM HG: ICD-10-PCS | Mod: CPTII,S$GLB,, | Performed by: INTERNAL MEDICINE

## 2022-03-29 PROCEDURE — 3074F SYST BP LT 130 MM HG: CPT | Mod: CPTII,S$GLB,, | Performed by: INTERNAL MEDICINE

## 2022-03-29 PROCEDURE — 1101F PR PT FALLS ASSESS DOC 0-1 FALLS W/OUT INJ PAST YR: ICD-10-PCS | Mod: CPTII,S$GLB,, | Performed by: INTERNAL MEDICINE

## 2022-03-29 RX ORDER — HYDRALAZINE HYDROCHLORIDE 100 MG/1
50 TABLET, FILM COATED ORAL 2 TIMES DAILY
Qty: 270 TABLET | Refills: 3
Start: 2022-03-29 | End: 2022-04-26

## 2022-03-29 RX ORDER — VORICONAZOLE 200 MG/1
TABLET, FILM COATED ORAL
Qty: 60 TABLET | Refills: 3 | Status: ON HOLD | OUTPATIENT
Start: 2022-03-29 | End: 2022-04-06 | Stop reason: HOSPADM

## 2022-03-29 NOTE — PROGRESS NOTES
Subjective:      Patient ID: Myesha Quinones is a 81 y.o. female.    Chief Complaint:Referral (ABPA)      History of Present Illness    An 81-year-old woman with CAD, HTN, ischemic cardiomyopathy, HFrEF-45-50%, diastolic HF, s/p biventricular ICD, DM2 with polyneuropathy, COPD, and severe persistent asthma who was referred for management of allergic bronchopulmonary aspergillosis (ABPA). Mrs. Quinones has experienced chronic progressive shortness of breath with wheezing and congestion. She feels as though she has mucous production but is unable to expectorate. She has completed a short course of steroids without improvement as well as antibiotics such as doxycyline and Augmentin. She was evaluated by pulmonologist Dr. Maki with work up revealing elevated serum IgE levels, as well as mucous plugging on CT of the chest. Sputum cultures on 3/9/22 are positive for Aspergillus species.     Review of Systems   Constitutional: Positive for malaise/fatigue. Negative for chills, decreased appetite, fever, night sweats, weight gain and weight loss.   HENT: Positive for congestion and hearing loss. Negative for ear pain, hoarse voice, sore throat and tinnitus.    Eyes: Negative for blurred vision, redness and visual disturbance.   Cardiovascular: Positive for leg swelling. Negative for chest pain and palpitations.   Respiratory: Positive for cough, shortness of breath, sputum production and wheezing. Negative for hemoptysis.    Hematologic/Lymphatic: Negative for adenopathy. Bruises/bleeds easily.   Skin: Negative for dry skin, itching, rash and suspicious lesions.   Musculoskeletal: Positive for muscle weakness. Negative for back pain, joint pain, myalgias and neck pain.   Gastrointestinal: Negative for abdominal pain, constipation, diarrhea, heartburn, nausea and vomiting.   Genitourinary: Positive for dysuria, frequency and urgency. Negative for flank pain, hematuria and hesitancy.   Neurological: Positive for  dizziness. Negative for headaches, numbness, paresthesias and weakness.   Psychiatric/Behavioral: Negative for depression and memory loss. The patient does not have insomnia and is not nervous/anxious.      Objective:   Physical Exam  Vitals and nursing note reviewed.   Constitutional:       Appearance: She is well-developed.   HENT:      Head: Normocephalic and atraumatic.      Right Ear: External ear normal.      Left Ear: External ear normal.   Eyes:      General: No scleral icterus.        Right eye: No discharge.         Left eye: No discharge.      Conjunctiva/sclera: Conjunctivae normal.   Cardiovascular:      Rate and Rhythm: Normal rate and regular rhythm.      Heart sounds: Normal heart sounds. No murmur heard.    No friction rub. No gallop.   Pulmonary:      Effort: Pulmonary effort is normal. No respiratory distress.      Breath sounds: No stridor. Wheezing (with prolonged expiratory phase) present. No rales.   Abdominal:      General: Bowel sounds are normal.   Musculoskeletal:         General: No tenderness. Normal range of motion.   Skin:     General: Skin is warm and dry.   Neurological:      Mental Status: She is alert and oriented to person, place, and time.             Component      Latest Ref Rng & Units 3/9/2022   Fungus (Mycology) Culture       ASPERGILLUS SPECIES (A) . . .         Assessment:       1. ABPA (allergic bronchopulmonary aspergillosis)    2. Severe persistent chronic asthma without complication          Plan:   Patient's clinical presentation and available laboratory work up is consistent with ABPA. She is unable to tolerate long term systemic glucocorticoids due to heart failure and adverse effects in the past. Ideally, itraconazole would be drug of choice however patient has heart failure which is a contraindication for this therapy.    · Discussed diagnosis and management of ABPA.   · Will start Voriconazole 400 mg BID loading dose followed by 200 mg BID for at least 16 weeks.    · Will get voriconazole levels one week after initiation of treatment to ensure level is therapeutic.   · Have advise patient on need for frequent ophthalmologic evaluation while on voriconazole.   · RTC in 6 weeks.

## 2022-03-29 NOTE — PATIENT INSTRUCTIONS
Take voriconazole 400 mg twice per day the first day then 200 mg twice per day thereafter. Continue taking 200 mg twice per day when you refill.    Will get blood work for voriconazole (antifungal) level in one week    Follow up with ophthalmology (eye doctor) due to antifungal treatment

## 2022-03-29 NOTE — TELEPHONE ENCOUNTER
"Pt message received via portal that pt /48 and pt c/o dizziness.  Called and spoke to pt.  Pt reports doing well with the exception of her dizziness with "low pressure".  Pt reports she has already stopped Imdur.  Reviewed with Li WEEMS.  Plan is for pt to decrease hydralazine to 50 mg TID.  Explained to pt and she reports she has already decreased to taking hydralazine 100 mg BID.  Discussed with Li WEEMS, plan is to decrease to 50 mg BID.  PT verbalizes read back of change.      Attempted to call Macho to update on plan, no answer.  Left message to return call.   "

## 2022-03-31 ENCOUNTER — TELEPHONE (OUTPATIENT)
Dept: ENDOCRINOLOGY | Facility: CLINIC | Age: 82
End: 2022-03-31
Payer: MEDICARE

## 2022-03-31 NOTE — TELEPHONE ENCOUNTER
----- Message from Elidia Madison NP sent at 3/30/2022  2:24 PM CDT -----  Regarding: RE: Pt Inquiry  Last time I spoke with her the regimen was: Lantus 22units DAILY    Please confirm what she is taking now and obtain a glucose log.   ----- Message -----  From: Alayna Fournier MA  Sent: 3/30/2022  12:40 PM CDT  To: Elidia Madison NP  Subject: FW: Pt Inquiry                                   Please advise    ----- Message -----  From: Harish Melissa  Sent: 3/30/2022  12:22 PM CDT  To: [facility]  Subject: Pt Inquiry                                       Pt called and requesting a call back in regards to how to take her insulin. Pt states she was in the hospital and it was up and down.      [phone] (home)

## 2022-04-01 ENCOUNTER — TELEPHONE (OUTPATIENT)
Dept: CARDIOLOGY | Facility: CLINIC | Age: 82
End: 2022-04-01
Payer: MEDICARE

## 2022-04-01 ENCOUNTER — OFFICE VISIT (OUTPATIENT)
Dept: UROLOGY | Facility: CLINIC | Age: 82
End: 2022-04-01
Payer: MEDICARE

## 2022-04-01 ENCOUNTER — TELEPHONE (OUTPATIENT)
Dept: PULMONOLOGY | Facility: CLINIC | Age: 82
End: 2022-04-01
Payer: MEDICARE

## 2022-04-01 VITALS
DIASTOLIC BLOOD PRESSURE: 56 MMHG | SYSTOLIC BLOOD PRESSURE: 131 MMHG | BODY MASS INDEX: 25.01 KG/M2 | WEIGHT: 141.13 LBS | HEART RATE: 74 BPM | HEIGHT: 63 IN

## 2022-04-01 DIAGNOSIS — R33.9 URINARY RETENTION: Primary | ICD-10-CM

## 2022-04-01 PROCEDURE — 81002 POCT URINE DIPSTICK WITHOUT MICROSCOPE: ICD-10-PCS | Mod: S$GLB,,, | Performed by: UROLOGY

## 2022-04-01 PROCEDURE — 1160F PR REVIEW ALL MEDS BY PRESCRIBER/CLIN PHARMACIST DOCUMENTED: ICD-10-PCS | Mod: CPTII,S$GLB,, | Performed by: UROLOGY

## 2022-04-01 PROCEDURE — 87077 CULTURE AEROBIC IDENTIFY: CPT | Performed by: UROLOGY

## 2022-04-01 PROCEDURE — 99213 PR OFFICE/OUTPT VISIT, EST, LEVL III, 20-29 MIN: ICD-10-PCS | Mod: S$GLB,,, | Performed by: UROLOGY

## 2022-04-01 PROCEDURE — 1159F MED LIST DOCD IN RCRD: CPT | Mod: CPTII,S$GLB,, | Performed by: UROLOGY

## 2022-04-01 PROCEDURE — 99499 UNLISTED E&M SERVICE: CPT | Mod: S$GLB,,, | Performed by: UROLOGY

## 2022-04-01 PROCEDURE — 99999 PR PBB SHADOW E&M-EST. PATIENT-LVL III: CPT | Mod: PBBFAC,,, | Performed by: UROLOGY

## 2022-04-01 PROCEDURE — 99213 OFFICE O/P EST LOW 20 MIN: CPT | Mod: S$GLB,,, | Performed by: UROLOGY

## 2022-04-01 PROCEDURE — 99999 PR PBB SHADOW E&M-EST. PATIENT-LVL III: ICD-10-PCS | Mod: PBBFAC,,, | Performed by: UROLOGY

## 2022-04-01 PROCEDURE — 3075F SYST BP GE 130 - 139MM HG: CPT | Mod: CPTII,S$GLB,, | Performed by: UROLOGY

## 2022-04-01 PROCEDURE — 87186 SC STD MICRODIL/AGAR DIL: CPT | Performed by: UROLOGY

## 2022-04-01 PROCEDURE — 1159F PR MEDICATION LIST DOCUMENTED IN MEDICAL RECORD: ICD-10-PCS | Mod: CPTII,S$GLB,, | Performed by: UROLOGY

## 2022-04-01 PROCEDURE — 81002 URINALYSIS NONAUTO W/O SCOPE: CPT | Mod: S$GLB,,, | Performed by: UROLOGY

## 2022-04-01 PROCEDURE — 99499 RISK ADDL DX/OHS AUDIT: ICD-10-PCS | Mod: S$GLB,,, | Performed by: UROLOGY

## 2022-04-01 PROCEDURE — 51798 POCT BLADDER SCAN: ICD-10-PCS | Mod: S$GLB,,, | Performed by: UROLOGY

## 2022-04-01 PROCEDURE — 3078F DIAST BP <80 MM HG: CPT | Mod: CPTII,S$GLB,, | Performed by: UROLOGY

## 2022-04-01 PROCEDURE — 1126F PR PAIN SEVERITY QUANTIFIED, NO PAIN PRESENT: ICD-10-PCS | Mod: CPTII,S$GLB,, | Performed by: UROLOGY

## 2022-04-01 PROCEDURE — 3078F PR MOST RECENT DIASTOLIC BLOOD PRESSURE < 80 MM HG: ICD-10-PCS | Mod: CPTII,S$GLB,, | Performed by: UROLOGY

## 2022-04-01 PROCEDURE — 51798 US URINE CAPACITY MEASURE: CPT | Mod: S$GLB,,, | Performed by: UROLOGY

## 2022-04-01 PROCEDURE — 87088 URINE BACTERIA CULTURE: CPT | Performed by: UROLOGY

## 2022-04-01 PROCEDURE — 87086 URINE CULTURE/COLONY COUNT: CPT | Performed by: UROLOGY

## 2022-04-01 PROCEDURE — 3075F PR MOST RECENT SYSTOLIC BLOOD PRESS GE 130-139MM HG: ICD-10-PCS | Mod: CPTII,S$GLB,, | Performed by: UROLOGY

## 2022-04-01 PROCEDURE — 1126F AMNT PAIN NOTED NONE PRSNT: CPT | Mod: CPTII,S$GLB,, | Performed by: UROLOGY

## 2022-04-01 PROCEDURE — 1160F RVW MEDS BY RX/DR IN RCRD: CPT | Mod: CPTII,S$GLB,, | Performed by: UROLOGY

## 2022-04-01 NOTE — PROGRESS NOTES
Subjective:       Patient ID: Myesha Quinones is a 81 y.o. female.    Chief Complaint: Follow-up     This is a 81 y.o.  female patient that is an established patient.   Has multiple medical problems including severe COPD as well as heart failure, CKD 4.  She was recently admitted to the hospital exacerbation of lower extremity edema/CHF/COPD at which point she received diuretics and required intermittent catheterization for large volume urine output.  Following that she had a indwelling Lauren catheter is placed is been indwelling since February 28th 2022. She is tolerating the catheter okay.  She denies history of incomplete emptying, incontinence, hematuria, dysuria or recurrent urinary tract infections.    I personally reviewed the images: [facility] 2/2022: left AML, Right adrenal nodule, MRD, no hydronephrosis    3/16/22: here for void trial but having some hematuria, lauren is not secured.    3/18/22: here for void trial, urine cleared  4/1/22: reports voiding, since Monday with frequency/dysuria.   PVR >600         Lab Results   Component Value Date    CREATININE 2.6 (H) 03/21/2022       ---  Past Medical History:   Diagnosis Date    Acute hypoxemic respiratory failure 12/19/2019    Acute right-sided thoracic back pain 12/09/2019    Allergy     Asthma     Basal cell carcinoma     left forehead    Basal cell carcinoma     left nose    Basal cell carcinoma 05/20/2015    right nose    Basal cell carcinoma 12/22/2015    left lower post neck    Basal cell carcinoma 12/03/2019    left ant scalp     Breast cancer     CAD (coronary artery disease)     Cardiomyopathy     Cardiomyopathy, ischemic     Cataract     CHF (congestive heart failure)     Chronic kidney disease, stage 3, mod decreased GFR 02/14/2017    Colon polyp 2011    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, with long-term current use of insulin 02/22/2018    COPD  (chronic obstructive pulmonary disease)     COPD exacerbation 04/08/2018    Current mild episode of major depressive disorder without prior episode 01/25/2022    Defibrillator discharge     Diabetes mellitus     Diabetes mellitus type II     Diabetes with neurologic complications     Goiter     MNG    HX: breast cancer     Hyperlipidemia     Hypertension     Iron deficiency anemia 05/16/2017    Left kidney mass     Meningioma     Microalbuminuria due to type 2 diabetes mellitus 01/26/2022    Osteoporosis, postmenopausal     Pneumonia 12/08/2019    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    Proteinuria 1/21/2019    Pseudomonas pneumonia     Skin cancer     s/p excision    Sleep apnea     CPAP    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/2015    mid forehead    Unspecified vitamin D deficiency     Ventricular tachycardia     Vitamin B12 deficiency     Vitamin D deficiency disease        Past Surgical History:   Procedure Laterality Date    BASAL CELL CARCINOMA EXCISION      posterior neck and nose    BREAST BIOPSY      BREAST CYST EXCISION Left     BREAST SURGERY      CARDIAC DEFIBRILLATOR PLACEMENT      x 2    CATARACT EXTRACTION W/  INTRAOCULAR LENS IMPLANT Bilateral     CHOLECYSTECTOMY      COLONOSCOPY N/A 11/5/2019    Procedure: COLONOSCOPY;  Surgeon: Boaz Botello MD;  Location: [facility] ENDO ([address] [place] [address]);  Service: Endoscopy;  Laterality: N/A;  AICD - [employer] -     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    FRACTURE SURGERY      left elbow and wrist as a child    HYSTERECTOMY      MASTECTOMY Right     REPLACEMENT OF IMPLANTABLE CARDIOVERTER-DEFIBRILLATOR (ICD) GENERATOR N/A 12/17/2018    Procedure: REPLACEMENT, ICD GENERATOR;  Surgeon: Jan Mckeon MD;  Location: [facility] EP LAB;  Service: Cardiology;  Laterality: N/A;  DONNA, CRT-D gen change, MDT, MAC, SK, 3 Prep    REVISION OF SKIN POCKET FOR CARDIOVERTER-DEFIBRILLATOR  12/17/2018    Procedure: Revision, Skin Pocket, For  Cardioverter-Defibrillator;  Surgeon: Jan Mckeon MD;  Location: [facility] LAB;  Service: Cardiology;;    SQUAMOUS CELL CARCINOMA EXCISION      remved from forehead    TONSILLECTOMY         Family History   Problem Relation Age of Onset    Diabetes Father     Heart disease Father     Diabetes Sister     Heart disease Sister     Diabetes Brother     Heart disease Brother     Hypertension Brother     Diabetes Brother     Heart disease Brother     Hypertension Brother     Diabetes Brother     Heart disease Brother     Cancer Brother         colon    Diabetes Brother     Cancer Son         skin    Diabetes Son         prediabetes    Diabetes Daughter         prediabetes    Cancer Daughter         melanoma    Obesity Daughter     Melanoma Daughter     Diabetes Son     Asthma Mother     Hypertension Mother     Stroke Mother     No Known Problems Maternal Grandmother     No Known Problems Maternal Grandfather     No Known Problems Paternal Grandmother     No Known Problems Paternal Grandfather     Amblyopia Neg Hx     Blindness Neg Hx     Cataracts Neg Hx     Glaucoma Neg Hx     Macular degeneration Neg Hx     Retinal detachment Neg Hx     Strabismus Neg Hx     Thyroid disease Neg Hx        Social History     Tobacco Use    Smoking status: Never Smoker    Smokeless tobacco: Never Used   Substance Use Topics    Alcohol use: No     Alcohol/week: 0.0 standard drinks    Drug use: No       Current Outpatient Medications on File Prior to Visit   Medication Sig Dispense Refill    albuterol (PROVENTIL/VENTOLIN HFA) 90 mcg/actuation inhaler INHALE 2 PUFFS INTO THE LUNGS EVERY 6 (SIX) HOURS AS NEEDED FOR WHEEZING. RESCUE 18 g 12    albuterol-ipratropium (DUO-NEB) 2.5 mg-0.5 mg/3 mL nebulizer solution TAKE 3 MLS BY NEBULIZATION EVERY 4 (FOUR) HOURS AS NEEDED FOR WHEEZING. 270 mL 12    ascorbic acid, vitamin C, (VITAMIN C) 100 MG tablet Take 100 mg by mouth once daily.      benzonatate  (TESSALON) 100 MG capsule Take 1 capsule (100 mg total) by mouth 3 (three) times daily as needed for Cough. 20 capsule 0    blood sugar diagnostic (ACCU-CHEK HECTOR) Strp Uses Accu-Check Hector meter to test BG 4x/day 400 strip 6    carvediloL (COREG) 25 MG tablet TAKE 2 TABLETS (50 MG TOTAL) BY MOUTH 2 (TWO) TIMES DAILY. 360 tablet 3    cholecalciferol, vitamin D3, (VITAMIN D3) 50 mcg (2,000 unit) Tab Take 1 tablet by mouth once daily.       cloNIDine 0.1 mg/24 hr td ptwk (CATAPRES) 0.1 mg/24 hr Place 1 patch onto the skin every Tuesday. 12 patch 11    CYANOCOBALAMIN, VITAMIN B-12, (B-12 DOTS ORAL) Take 1 tablet by mouth once daily.      diltiaZEM (CARDIZEM CD) 240 MG 24 hr capsule Take 1 capsule (240 mg total) by mouth once daily. 30 capsule 11    FLUAD QUAD 2021-22,65Y UP,,PF, 60 mcg (15 mcg x 4)/0.5 mL Syrg       fluticasone propionate (FLONASE) 50 mcg/actuation nasal spray 2 sprays (100 mcg total) by Each Nostril route once daily. 16 g 12    fluticasone-salmeterol diskus inhaler 250-50 mcg Inhale 1 puff into the lungs 2 (two) times daily. Controller 180 each 3    furosemide (LASIX) 20 MG tablet Take 1 tablet (20 mg total) by mouth once daily. 90 tablet 2    hydrALAZINE (APRESOLINE) 100 MG tablet Take 0.5 tablets (50 mg total) by mouth 2 (two) times daily. 270 tablet 3    insulin (LANTUS SOLOSTAR U-100 INSULIN) glargine 100 units/mL (3mL) SubQ pen Inject into the skin.      irbesartan (AVAPRO) 300 MG tablet Take 1 tablet (300 mg total) by mouth every evening. 90 tablet 3    lancets (ACCU-CHEK SOFTCLIX LANCETS) Misc Uses Accu-Chek Hector meter to test BG 1x/day 400 each 3    levocetirizine (XYZAL) 5 MG tablet Take 5 mg by mouth every evening.      magnesium oxide (MAG-OX) 400 mg tablet Take 1 tablet (400 mg total) by mouth once daily.  0    montelukast (SINGULAIR) 10 mg tablet Take 1 tablet (10 mg total) by mouth every evening. 90 tablet 3    mucus clearing device (ACAPELLA, FLUTTER) by  "Misc.(Non-Drug; Combo Route) route 4 (four) times daily as needed (to relieve mucous). 1 each 0    nitroGLYCERIN (NITROSTAT) 0.4 MG SL tablet Place 0.4 mg under the tongue every 5 (five) minutes as needed for Chest pain.       omega-3 fatty acids 500 mg Cap Take 1 capsule by mouth Twice daily.      pen needle, diabetic (BD ULTRA-FINE MINI PEN NEEDLE) 31 gauge x 3/16" Ndle USE WITH LANTUS AT BEDTIME 400 each 3    polyethylene glycol (GLYCOLAX) 17 gram PwPk Take 17 g by mouth daily as needed (constipation). 10 each 1    pravastatin (PRAVACHOL) 40 MG tablet Take 1 tablet (40 mg total) by mouth once daily. 90 tablet 3    pulse oximeter (PULSE OXIMETER) device by Apply Externally route 2 (two) times a day. Use twice daily at 8 AM and 3 PM and record the value in [employer] as directed. 1 each 0    sodium chloride 3% 3 % nebulizer solution TAKE 4 MLS BY NEBULIZATION 4 (FOUR) TIMES DAILY AS NEEDED FOR OTHER OR COUGH (TO INDUCE SPUTUM AND CLEAR MUCOUS.). 750 mL 11    tiotropium (SPIRIVA) 18 mcg inhalation capsule Inhale 1 capsule (18 mcg total) into the lungs once daily. Controller 90 capsule 3    voriconazole (VFEND) 200 MG Tab Take two tablets (400 mg) twice per day the first day then take one tablet (200 mg) twice per day starting the second day 60 tablet 3    VENOFER 100 mg iron/5 mL injection        No current facility-administered medications on file prior to visit.       Review of patient's allergies indicates:   Allergen Reactions    Iodine and iodide containing products Hives    Nifedipine      weakness       Review of Systems   Constitutional: Negative for activity change, chills, fatigue and fever.   Respiratory: Positive for shortness of breath. Negative for cough and chest tightness.    Cardiovascular: Negative for chest pain.   Gastrointestinal: Negative for abdominal distention, abdominal pain, nausea and vomiting.   Genitourinary: Positive for difficulty urinating. Negative for flank pain, hematuria " and pelvic pain.   Musculoskeletal: Negative for back pain and gait problem.       Objective:      Physical Exam  HENT:      Head: Normocephalic.   Cardiovascular:      Pulses: Normal pulses.   Pulmonary:      Effort: Pulmonary effort is normal.   Abdominal:      General: Abdomen is flat. There is no distension.      Palpations: Abdomen is soft.      Tenderness: There is no abdominal tenderness. There is no right CVA tenderness, left CVA tenderness or guarding.   Genitourinary:     Comments: Lauren clear   Musculoskeletal:      Cervical back: Normal range of motion.   Skin:     General: Skin is warm.   Neurological:      General: No focal deficit present.      Mental Status: She is alert.         Assessment:     Problem Noted   Urinary Retention 2/28/2022    80 yo F with urinary retention.  Lauren placed 2/28/22.      Ckd (Chronic Kidney Disease) Stage 4, Gfr 15-29 Ml/Min 7/27/2020     Plan:     1. Replace lauren, 16F lauren   2. Urine culture, will treat based off results  3. Follow up in 2 weeks    Steffen Osborn MD

## 2022-04-02 ENCOUNTER — HOSPITAL ENCOUNTER (INPATIENT)
Facility: HOSPITAL | Age: 82
LOS: 4 days | Discharge: HOME HEALTH | DRG: 872 | End: 2022-04-06
Attending: EMERGENCY MEDICINE | Admitting: HOSPITALIST
Payer: MEDICARE

## 2022-04-02 ENCOUNTER — ANESTHESIA EVENT (OUTPATIENT)
Dept: EMERGENCY MEDICINE | Facility: HOSPITAL | Age: 82
DRG: 872 | End: 2022-04-02
Payer: MEDICARE

## 2022-04-02 ENCOUNTER — ANESTHESIA (OUTPATIENT)
Dept: EMERGENCY MEDICINE | Facility: HOSPITAL | Age: 82
DRG: 872 | End: 2022-04-02
Payer: MEDICARE

## 2022-04-02 ENCOUNTER — NURSE TRIAGE (OUTPATIENT)
Dept: ADMINISTRATIVE | Facility: CLINIC | Age: 82
End: 2022-04-02
Payer: MEDICARE

## 2022-04-02 DIAGNOSIS — R53.83 FATIGUE, UNSPECIFIED TYPE: ICD-10-CM

## 2022-04-02 DIAGNOSIS — R07.9 CHEST PAIN: ICD-10-CM

## 2022-04-02 DIAGNOSIS — R65.10 SIRS (SYSTEMIC INFLAMMATORY RESPONSE SYNDROME): ICD-10-CM

## 2022-04-02 DIAGNOSIS — I95.9 HYPOTENSION: ICD-10-CM

## 2022-04-02 DIAGNOSIS — T68.XXXA HYPOTHERMIA, INITIAL ENCOUNTER: ICD-10-CM

## 2022-04-02 DIAGNOSIS — R00.1 BRADYCARDIA: ICD-10-CM

## 2022-04-02 DIAGNOSIS — R33.9 URINARY RETENTION: ICD-10-CM

## 2022-04-02 DIAGNOSIS — I50.43 ACUTE ON CHRONIC COMBINED SYSTOLIC AND DIASTOLIC HEART FAILURE: ICD-10-CM

## 2022-04-02 DIAGNOSIS — G47.33 OBSTRUCTIVE SLEEP APNEA: ICD-10-CM

## 2022-04-02 DIAGNOSIS — A31.0 MYCOBACTERIUM AVIUM COMPLEX: ICD-10-CM

## 2022-04-02 DIAGNOSIS — B44.81 ABPA (ALLERGIC BRONCHOPULMONARY ASPERGILLOSIS): ICD-10-CM

## 2022-04-02 DIAGNOSIS — E87.5 HYPERKALEMIA: ICD-10-CM

## 2022-04-02 DIAGNOSIS — E87.1 HYPONATREMIA: Primary | ICD-10-CM

## 2022-04-02 PROBLEM — N39.0 UTI (URINARY TRACT INFECTION): Status: ACTIVE | Noted: 2022-04-02

## 2022-04-02 PROBLEM — A41.9 SEPSIS: Status: ACTIVE | Noted: 2022-04-02

## 2022-04-02 LAB
ALBUMIN SERPL BCP-MCNC: 2.9 G/DL (ref 3.5–5.2)
ALP SERPL-CCNC: 121 U/L (ref 55–135)
ALT SERPL W/O P-5'-P-CCNC: 27 U/L (ref 10–44)
ANION GAP SERPL CALC-SCNC: 10 MMOL/L (ref 8–16)
ANION GAP SERPL CALC-SCNC: 11 MMOL/L (ref 8–16)
AST SERPL-CCNC: 26 U/L (ref 10–40)
BACTERIA #/AREA URNS HPF: ABNORMAL /HPF
BASOPHILS # BLD AUTO: 0.04 K/UL (ref 0–0.2)
BASOPHILS NFR BLD: 0.4 % (ref 0–1.9)
BILIRUB SERPL-MCNC: 0.3 MG/DL (ref 0.1–1)
BILIRUB UR QL STRIP: NEGATIVE
BNP SERPL-MCNC: 308 PG/ML (ref 0–99)
BUN SERPL-MCNC: 71 MG/DL (ref 8–23)
BUN SERPL-MCNC: 72 MG/DL (ref 8–23)
CALCIUM SERPL-MCNC: 7.6 MG/DL (ref 8.7–10.5)
CALCIUM SERPL-MCNC: 8.3 MG/DL (ref 8.7–10.5)
CHLORIDE SERPL-SCNC: 91 MMOL/L (ref 95–110)
CHLORIDE SERPL-SCNC: 92 MMOL/L (ref 95–110)
CLARITY UR: ABNORMAL
CO2 SERPL-SCNC: 13 MMOL/L (ref 23–29)
CO2 SERPL-SCNC: 16 MMOL/L (ref 23–29)
COLOR UR: YELLOW
CREAT SERPL-MCNC: 2.5 MG/DL (ref 0.5–1.4)
CREAT SERPL-MCNC: 2.6 MG/DL (ref 0.5–1.4)
DIFFERENTIAL METHOD: ABNORMAL
EOSINOPHIL # BLD AUTO: 0.3 K/UL (ref 0–0.5)
EOSINOPHIL NFR BLD: 3 % (ref 0–8)
ERYTHROCYTE [DISTWIDTH] IN BLOOD BY AUTOMATED COUNT: 13.2 % (ref 11.5–14.5)
EST. GFR  (AFRICAN AMERICAN): 19 ML/MIN/1.73 M^2
EST. GFR  (AFRICAN AMERICAN): 20 ML/MIN/1.73 M^2
EST. GFR  (NON AFRICAN AMERICAN): 17 ML/MIN/1.73 M^2
EST. GFR  (NON AFRICAN AMERICAN): 17 ML/MIN/1.73 M^2
GLUCOSE SERPL-MCNC: 168 MG/DL (ref 70–110)
GLUCOSE SERPL-MCNC: 206 MG/DL (ref 70–110)
GLUCOSE UR QL STRIP: ABNORMAL
HCT VFR BLD AUTO: 27.2 % (ref 37–48.5)
HGB BLD-MCNC: 8.5 G/DL (ref 12–16)
HGB UR QL STRIP: ABNORMAL
HYALINE CASTS #/AREA URNS LPF: 0 /LPF
IMM GRANULOCYTES # BLD AUTO: 0.4 K/UL (ref 0–0.04)
IMM GRANULOCYTES NFR BLD AUTO: 4.1 % (ref 0–0.5)
KETONES UR QL STRIP: NEGATIVE
LACTATE SERPL-SCNC: 0.7 MMOL/L (ref 0.5–2.2)
LEUKOCYTE ESTERASE UR QL STRIP: ABNORMAL
LYMPHOCYTES # BLD AUTO: 1.5 K/UL (ref 1–4.8)
LYMPHOCYTES NFR BLD: 14.9 % (ref 18–48)
MAGNESIUM SERPL-MCNC: 2.2 MG/DL (ref 1.6–2.6)
MCH RBC QN AUTO: 29.2 PG (ref 27–31)
MCHC RBC AUTO-ENTMCNC: 31.3 G/DL (ref 32–36)
MCV RBC AUTO: 94 FL (ref 82–98)
MICROSCOPIC COMMENT: ABNORMAL
MONOCYTES # BLD AUTO: 0.7 K/UL (ref 0.3–1)
MONOCYTES NFR BLD: 7.1 % (ref 4–15)
NEUTROPHILS # BLD AUTO: 6.9 K/UL (ref 1.8–7.7)
NEUTROPHILS NFR BLD: 70.5 % (ref 38–73)
NITRITE UR QL STRIP: NEGATIVE
NON-SQ EPI CELLS #/AREA URNS HPF: 12 /HPF
NRBC BLD-RTO: 0 /100 WBC
PH UR STRIP: 6 [PH] (ref 5–8)
PLATELET # BLD AUTO: 237 K/UL (ref 150–450)
PMV BLD AUTO: 9.8 FL (ref 9.2–12.9)
POCT GLUCOSE: 216 MG/DL (ref 70–110)
POCT GLUCOSE: 217 MG/DL (ref 70–110)
POCT GLUCOSE: 301 MG/DL (ref 70–110)
POTASSIUM SERPL-SCNC: 6 MMOL/L (ref 3.5–5.1)
POTASSIUM SERPL-SCNC: 6.1 MMOL/L (ref 3.5–5.1)
POTASSIUM SERPL-SCNC: 6.2 MMOL/L (ref 3.5–5.1)
PROCALCITONIN SERPL IA-MCNC: 0.06 NG/ML
PROT SERPL-MCNC: 6.5 G/DL (ref 6–8.4)
PROT UR QL STRIP: ABNORMAL
RBC # BLD AUTO: 2.91 M/UL (ref 4–5.4)
RBC #/AREA URNS HPF: >100 /HPF (ref 0–4)
SARS-COV-2 RDRP RESP QL NAA+PROBE: NEGATIVE
SODIUM SERPL-SCNC: 116 MMOL/L (ref 136–145)
SODIUM SERPL-SCNC: 117 MMOL/L (ref 136–145)
SP GR UR STRIP: 1.01 (ref 1–1.03)
SQUAMOUS #/AREA URNS HPF: 7 /HPF
TROPONIN I SERPL DL<=0.01 NG/ML-MCNC: 0.01 NG/ML (ref 0–0.03)
TSH SERPL DL<=0.005 MIU/L-ACNC: 0.7 UIU/ML (ref 0.4–4)
URATE SERPL-MCNC: 7.3 MG/DL (ref 2.4–5.7)
URN SPEC COLLECT METH UR: ABNORMAL
UROBILINOGEN UR STRIP-ACNC: NEGATIVE EU/DL
WBC # BLD AUTO: 9.83 K/UL (ref 3.9–12.7)
WBC #/AREA URNS HPF: >100 /HPF (ref 0–5)
WBC CLUMPS URNS QL MICRO: ABNORMAL

## 2022-04-02 PROCEDURE — 93010 EKG 12-LEAD: ICD-10-PCS | Mod: ,,, | Performed by: INTERNAL MEDICINE

## 2022-04-02 PROCEDURE — 99285 EMERGENCY DEPT VISIT HI MDM: CPT | Mod: 25

## 2022-04-02 PROCEDURE — 63600175 PHARM REV CODE 636 W HCPCS: Performed by: STUDENT IN AN ORGANIZED HEALTH CARE EDUCATION/TRAINING PROGRAM

## 2022-04-02 PROCEDURE — 84132 ASSAY OF SERUM POTASSIUM: CPT | Performed by: NURSE PRACTITIONER

## 2022-04-02 PROCEDURE — 94644 CONT INHLJ TX 1ST HOUR: CPT

## 2022-04-02 PROCEDURE — U0002 COVID-19 LAB TEST NON-CDC: HCPCS | Performed by: EMERGENCY MEDICINE

## 2022-04-02 PROCEDURE — 25000003 PHARM REV CODE 250: Performed by: HOSPITALIST

## 2022-04-02 PROCEDURE — 83735 ASSAY OF MAGNESIUM: CPT | Performed by: STUDENT IN AN ORGANIZED HEALTH CARE EDUCATION/TRAINING PROGRAM

## 2022-04-02 PROCEDURE — 96365 THER/PROPH/DIAG IV INF INIT: CPT

## 2022-04-02 PROCEDURE — 84550 ASSAY OF BLOOD/URIC ACID: CPT | Performed by: STUDENT IN AN ORGANIZED HEALTH CARE EDUCATION/TRAINING PROGRAM

## 2022-04-02 PROCEDURE — 94640 AIRWAY INHALATION TREATMENT: CPT

## 2022-04-02 PROCEDURE — 94761 N-INVAS EAR/PLS OXIMETRY MLT: CPT

## 2022-04-02 PROCEDURE — 93005 ELECTROCARDIOGRAM TRACING: CPT

## 2022-04-02 PROCEDURE — 87088 URINE BACTERIA CULTURE: CPT | Performed by: STUDENT IN AN ORGANIZED HEALTH CARE EDUCATION/TRAINING PROGRAM

## 2022-04-02 PROCEDURE — 36000 PLACE NEEDLE IN VEIN: CPT | Performed by: ANESTHESIOLOGY

## 2022-04-02 PROCEDURE — 85025 COMPLETE CBC W/AUTO DIFF WBC: CPT | Performed by: STUDENT IN AN ORGANIZED HEALTH CARE EDUCATION/TRAINING PROGRAM

## 2022-04-02 PROCEDURE — 87040 BLOOD CULTURE FOR BACTERIA: CPT | Performed by: NURSE PRACTITIONER

## 2022-04-02 PROCEDURE — 87086 URINE CULTURE/COLONY COUNT: CPT | Performed by: STUDENT IN AN ORGANIZED HEALTH CARE EDUCATION/TRAINING PROGRAM

## 2022-04-02 PROCEDURE — 84484 ASSAY OF TROPONIN QUANT: CPT | Performed by: STUDENT IN AN ORGANIZED HEALTH CARE EDUCATION/TRAINING PROGRAM

## 2022-04-02 PROCEDURE — 82962 GLUCOSE BLOOD TEST: CPT

## 2022-04-02 PROCEDURE — 80048 BASIC METABOLIC PNL TOTAL CA: CPT | Mod: XB | Performed by: STUDENT IN AN ORGANIZED HEALTH CARE EDUCATION/TRAINING PROGRAM

## 2022-04-02 PROCEDURE — 84443 ASSAY THYROID STIM HORMONE: CPT | Performed by: STUDENT IN AN ORGANIZED HEALTH CARE EDUCATION/TRAINING PROGRAM

## 2022-04-02 PROCEDURE — 93010 ELECTROCARDIOGRAM REPORT: CPT | Mod: ,,, | Performed by: INTERNAL MEDICINE

## 2022-04-02 PROCEDURE — 84145 PROCALCITONIN (PCT): CPT | Performed by: NURSE PRACTITIONER

## 2022-04-02 PROCEDURE — 83605 ASSAY OF LACTIC ACID: CPT | Performed by: STUDENT IN AN ORGANIZED HEALTH CARE EDUCATION/TRAINING PROGRAM

## 2022-04-02 PROCEDURE — 87040 BLOOD CULTURE FOR BACTERIA: CPT | Mod: 59 | Performed by: STUDENT IN AN ORGANIZED HEALTH CARE EDUCATION/TRAINING PROGRAM

## 2022-04-02 PROCEDURE — A4216 STERILE WATER/SALINE, 10 ML: HCPCS | Performed by: NURSE PRACTITIONER

## 2022-04-02 PROCEDURE — 25000003 PHARM REV CODE 250: Performed by: NURSE PRACTITIONER

## 2022-04-02 PROCEDURE — 87186 SC STD MICRODIL/AGAR DIL: CPT | Performed by: STUDENT IN AN ORGANIZED HEALTH CARE EDUCATION/TRAINING PROGRAM

## 2022-04-02 PROCEDURE — 25000003 PHARM REV CODE 250: Performed by: STUDENT IN AN ORGANIZED HEALTH CARE EDUCATION/TRAINING PROGRAM

## 2022-04-02 PROCEDURE — 25000242 PHARM REV CODE 250 ALT 637 W/ HCPCS: Performed by: STUDENT IN AN ORGANIZED HEALTH CARE EDUCATION/TRAINING PROGRAM

## 2022-04-02 PROCEDURE — 63600175 PHARM REV CODE 636 W HCPCS: Performed by: NURSE PRACTITIONER

## 2022-04-02 PROCEDURE — 82533 TOTAL CORTISOL: CPT | Performed by: STUDENT IN AN ORGANIZED HEALTH CARE EDUCATION/TRAINING PROGRAM

## 2022-04-02 PROCEDURE — 81000 URINALYSIS NONAUTO W/SCOPE: CPT | Performed by: STUDENT IN AN ORGANIZED HEALTH CARE EDUCATION/TRAINING PROGRAM

## 2022-04-02 PROCEDURE — 83880 ASSAY OF NATRIURETIC PEPTIDE: CPT | Performed by: STUDENT IN AN ORGANIZED HEALTH CARE EDUCATION/TRAINING PROGRAM

## 2022-04-02 PROCEDURE — 83930 ASSAY OF BLOOD OSMOLALITY: CPT | Performed by: STUDENT IN AN ORGANIZED HEALTH CARE EDUCATION/TRAINING PROGRAM

## 2022-04-02 PROCEDURE — 96367 TX/PROPH/DG ADDL SEQ IV INF: CPT

## 2022-04-02 PROCEDURE — 87077 CULTURE AEROBIC IDENTIFY: CPT | Performed by: STUDENT IN AN ORGANIZED HEALTH CARE EDUCATION/TRAINING PROGRAM

## 2022-04-02 PROCEDURE — 20000000 HC ICU ROOM

## 2022-04-02 PROCEDURE — 80053 COMPREHEN METABOLIC PANEL: CPT | Performed by: STUDENT IN AN ORGANIZED HEALTH CARE EDUCATION/TRAINING PROGRAM

## 2022-04-02 RX ORDER — AMOXICILLIN 250 MG
1 CAPSULE ORAL 2 TIMES DAILY
Status: DISCONTINUED | OUTPATIENT
Start: 2022-04-02 | End: 2022-04-06 | Stop reason: HOSPADM

## 2022-04-02 RX ORDER — TALC
6 POWDER (GRAM) TOPICAL NIGHTLY PRN
Status: DISCONTINUED | OUTPATIENT
Start: 2022-04-02 | End: 2022-04-06 | Stop reason: HOSPADM

## 2022-04-02 RX ORDER — SODIUM CHLORIDE 0.9 % (FLUSH) 0.9 %
10 SYRINGE (ML) INJECTION EVERY 8 HOURS
Status: DISCONTINUED | OUTPATIENT
Start: 2022-04-02 | End: 2022-04-06 | Stop reason: HOSPADM

## 2022-04-02 RX ORDER — GLUCAGON 1 MG
1 KIT INJECTION
Status: DISCONTINUED | OUTPATIENT
Start: 2022-04-02 | End: 2022-04-06 | Stop reason: HOSPADM

## 2022-04-02 RX ORDER — IPRATROPIUM BROMIDE AND ALBUTEROL SULFATE 2.5; .5 MG/3ML; MG/3ML
3 SOLUTION RESPIRATORY (INHALATION) EVERY 4 HOURS PRN
Status: DISCONTINUED | OUTPATIENT
Start: 2022-04-02 | End: 2022-04-03

## 2022-04-02 RX ORDER — MUPIROCIN 20 MG/G
OINTMENT TOPICAL 2 TIMES DAILY
Status: DISCONTINUED | OUTPATIENT
Start: 2022-04-02 | End: 2022-04-06 | Stop reason: HOSPADM

## 2022-04-02 RX ORDER — ACETAMINOPHEN 325 MG/1
650 TABLET ORAL EVERY 4 HOURS PRN
Status: DISCONTINUED | OUTPATIENT
Start: 2022-04-02 | End: 2022-04-06 | Stop reason: HOSPADM

## 2022-04-02 RX ORDER — SIMETHICONE 80 MG
1 TABLET,CHEWABLE ORAL 4 TIMES DAILY PRN
Status: DISCONTINUED | OUTPATIENT
Start: 2022-04-02 | End: 2022-04-06 | Stop reason: HOSPADM

## 2022-04-02 RX ORDER — MAG HYDROX/ALUMINUM HYD/SIMETH 200-200-20
30 SUSPENSION, ORAL (FINAL DOSE FORM) ORAL 4 TIMES DAILY PRN
Status: DISCONTINUED | OUTPATIENT
Start: 2022-04-02 | End: 2022-04-06 | Stop reason: HOSPADM

## 2022-04-02 RX ORDER — IBUPROFEN 200 MG
24 TABLET ORAL
Status: DISCONTINUED | OUTPATIENT
Start: 2022-04-02 | End: 2022-04-06 | Stop reason: HOSPADM

## 2022-04-02 RX ORDER — IBUPROFEN 200 MG
16 TABLET ORAL
Status: DISCONTINUED | OUTPATIENT
Start: 2022-04-02 | End: 2022-04-06 | Stop reason: HOSPADM

## 2022-04-02 RX ORDER — ONDANSETRON 2 MG/ML
4 INJECTION INTRAMUSCULAR; INTRAVENOUS EVERY 8 HOURS PRN
Status: DISCONTINUED | OUTPATIENT
Start: 2022-04-02 | End: 2022-04-06 | Stop reason: HOSPADM

## 2022-04-02 RX ORDER — SODIUM CHLORIDE 9 MG/ML
INJECTION, SOLUTION INTRAVENOUS CONTINUOUS
Status: DISCONTINUED | OUTPATIENT
Start: 2022-04-03 | End: 2022-04-03

## 2022-04-02 RX ORDER — INSULIN ASPART 100 [IU]/ML
0-5 INJECTION, SOLUTION INTRAVENOUS; SUBCUTANEOUS
Status: DISCONTINUED | OUTPATIENT
Start: 2022-04-02 | End: 2022-04-06 | Stop reason: HOSPADM

## 2022-04-02 RX ORDER — ONDANSETRON 8 MG/1
8 TABLET, ORALLY DISINTEGRATING ORAL EVERY 8 HOURS PRN
Status: DISCONTINUED | OUTPATIENT
Start: 2022-04-02 | End: 2022-04-06 | Stop reason: HOSPADM

## 2022-04-02 RX ORDER — HEPARIN SODIUM 5000 [USP'U]/ML
5000 INJECTION, SOLUTION INTRAVENOUS; SUBCUTANEOUS EVERY 8 HOURS
Status: DISCONTINUED | OUTPATIENT
Start: 2022-04-02 | End: 2022-04-06 | Stop reason: HOSPADM

## 2022-04-02 RX ORDER — PRAVASTATIN SODIUM 40 MG/1
40 TABLET ORAL DAILY
Status: DISCONTINUED | OUTPATIENT
Start: 2022-04-03 | End: 2022-04-06 | Stop reason: HOSPADM

## 2022-04-02 RX ORDER — IPRATROPIUM BROMIDE AND ALBUTEROL SULFATE 2.5; .5 MG/3ML; MG/3ML
3 SOLUTION RESPIRATORY (INHALATION)
Status: COMPLETED | OUTPATIENT
Start: 2022-04-02 | End: 2022-04-02

## 2022-04-02 RX ADMIN — MUPIROCIN: 20 OINTMENT TOPICAL at 11:04

## 2022-04-02 RX ADMIN — SODIUM CHLORIDE 500 ML: 0.9 INJECTION, SOLUTION INTRAVENOUS at 06:04

## 2022-04-02 RX ADMIN — SODIUM CHLORIDE: 0.9 INJECTION, SOLUTION INTRAVENOUS at 11:04

## 2022-04-02 RX ADMIN — HEPARIN SODIUM 5000 UNITS: 5000 INJECTION INTRAVENOUS; SUBCUTANEOUS at 10:04

## 2022-04-02 RX ADMIN — VANCOMYCIN HYDROCHLORIDE 1250 MG: 1.25 INJECTION, POWDER, LYOPHILIZED, FOR SOLUTION INTRAVENOUS at 07:04

## 2022-04-02 RX ADMIN — ACETAMINOPHEN 650 MG: 325 TABLET ORAL at 11:04

## 2022-04-02 RX ADMIN — DEXTROSE 250 ML: 10 SOLUTION INTRAVENOUS at 11:04

## 2022-04-02 RX ADMIN — SODIUM CHLORIDE 500 ML: 0.9 INJECTION, SOLUTION INTRAVENOUS at 07:04

## 2022-04-02 RX ADMIN — IPRATROPIUM BROMIDE AND ALBUTEROL SULFATE 3 ML: .5; 2.5 SOLUTION RESPIRATORY (INHALATION) at 07:04

## 2022-04-02 RX ADMIN — INSULIN HUMAN 6.4 UNITS: 100 INJECTION, SOLUTION PARENTERAL at 10:04

## 2022-04-02 RX ADMIN — INSULIN ASPART 2 UNITS: 100 INJECTION, SOLUTION INTRAVENOUS; SUBCUTANEOUS at 11:04

## 2022-04-02 RX ADMIN — SODIUM ZIRCONIUM CYCLOSILICATE 10 G: 5 POWDER, FOR SUSPENSION ORAL at 10:04

## 2022-04-02 RX ADMIN — PIPERACILLIN AND TAZOBACTAM 4.5 G: 4; .5 INJECTION, POWDER, LYOPHILIZED, FOR SOLUTION INTRAVENOUS; PARENTERAL at 06:04

## 2022-04-02 RX ADMIN — Medication 10 ML: at 10:04

## 2022-04-02 RX ADMIN — Medication 6 MG: at 11:04

## 2022-04-02 NOTE — ED PROVIDER NOTES
Encounter Date: 4/2/2022       History     Chief Complaint   Patient presents with    Hypotension     Pt's granddaughter reports pt was seen at urology yesterday for catheter placement due to infection. Pt was also started on a new medication by pulmonary. She has had weakness, low bp and low heart rate all day.      81-year-old female with PMH of ischemic cardiomyopathy with pacemaker placement, CHF (EF 45%), diabetes, ABPA, COPD, and asthma presents with generalized weakness.  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  Her fatigue is new since this morning, constant, progressive, without aggravating or relieving factors, and associated with difficulty with ambulation.  The patient typically lives alone and cares for herself.  She also reports mild shortness of breath that is acute on chronic.  She denies chest pain, headache, fever, abdominal pain, nausea, vomiting, hematuria, diarrhea, constipation, and black/bloody stools.  Patient was seen by Urology yesterday and had a new catheter placed for urinary retention.  They obtained a dipstick and sent for culture but did not start the patient on any antibiotics because they wanted to see the sensitivities.  Patient's daughter is bedside and denies any changes to the patient's mental status.    The history is provided by the patient and a relative.     Review of patient's allergies indicates:   Allergen Reactions    Iodine and iodide containing products Hives    Nifedipine      weakness     Past Medical History:   Diagnosis Date    Acute hypoxemic respiratory failure 12/19/2019    Acute right-sided thoracic back pain 12/09/2019    Allergy     Asthma     Basal cell carcinoma     left forehead    Basal cell carcinoma     left nose    Basal cell carcinoma 05/20/2015    right nose    Basal cell carcinoma 12/22/2015    left lower post neck    Basal cell carcinoma  12/03/2019    left ant scalp     Breast cancer     CAD (coronary artery disease)     Cardiomyopathy     Cardiomyopathy, ischemic     Cataract     CHF (congestive heart failure)     Chronic kidney disease, stage 3, mod decreased GFR 02/14/2017    Colon polyp 2011    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, with long-term current use of insulin 02/22/2018    COPD (chronic obstructive pulmonary disease)     COPD exacerbation 04/08/2018    Current mild episode of major depressive disorder without prior episode 01/25/2022    Defibrillator discharge     Diabetes mellitus     Diabetes mellitus type II     Diabetes with neurologic complications     Goiter     MNG    HX: breast cancer     Hyperlipidemia     Hypertension     Iron deficiency anemia 05/16/2017    Left kidney mass     Meningioma     Microalbuminuria due to type 2 diabetes mellitus 01/26/2022    Osteoporosis, postmenopausal     Pneumonia 12/08/2019    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    Proteinuria 1/21/2019    Pseudomonas pneumonia     Skin cancer     s/p excision    Sleep apnea     CPAP    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/2015    mid forehead    Unspecified vitamin D deficiency     Ventricular tachycardia     Vitamin B12 deficiency     Vitamin D deficiency disease      Past Surgical History:   Procedure Laterality Date    BASAL CELL CARCINOMA EXCISION      posterior neck and nose    BREAST BIOPSY      BREAST CYST EXCISION Left     BREAST SURGERY      CARDIAC DEFIBRILLATOR PLACEMENT      x 2    CATARACT EXTRACTION W/  INTRAOCULAR LENS IMPLANT Bilateral     CHOLECYSTECTOMY      COLONOSCOPY N/A 11/5/2019    Procedure: COLONOSCOPY;  Surgeon: Boaz Botello MD;  Location: [facility] ([address] [place] [address]);  Service: Endoscopy;  Laterality: N/A;  AICD - [employer] -     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    FRACTURE SURGERY      left elbow  and wrist as a child    HYSTERECTOMY      MASTECTOMY Right     REPLACEMENT OF IMPLANTABLE CARDIOVERTER-DEFIBRILLATOR (ICD) GENERATOR N/A 12/17/2018    Procedure: REPLACEMENT, ICD GENERATOR;  Surgeon: Jan Mckeon MD;  Location: [facility];  Service: Cardiology;  Laterality: N/A;  DONNA, CRT-D gen sulema, VICKEY, MAC, SK, 3 Prep    REVISION OF SKIN POCKET FOR CARDIOVERTER-DEFIBRILLATOR  12/17/2018    Procedure: Revision, Skin Pocket, For Cardioverter-Defibrillator;  Surgeon: Jan Mckeon MD;  Location: [facility];  Service: Cardiology;;    SQUAMOUS CELL CARCINOMA EXCISION      remved from forehead    TONSILLECTOMY       Family History   Problem Relation Age of Onset    Diabetes Father     Heart disease Father     Diabetes Sister     Heart disease Sister     Diabetes Brother     Heart disease Brother     Hypertension Brother     Diabetes Brother     Heart disease Brother     Hypertension Brother     Diabetes Brother     Heart disease Brother     Cancer Brother         colon    Diabetes Brother     Cancer Son         skin    Diabetes Son         prediabetes    Diabetes Daughter         prediabetes    Cancer Daughter         melanoma    Obesity Daughter     Melanoma Daughter     Diabetes Son     Asthma Mother     Hypertension Mother     Stroke Mother     No Known Problems Maternal Grandmother     No Known Problems Maternal Grandfather     No Known Problems Paternal Grandmother     No Known Problems Paternal Grandfather     Amblyopia Neg Hx     Blindness Neg Hx     Cataracts Neg Hx     Glaucoma Neg Hx     Macular degeneration Neg Hx     Retinal detachment Neg Hx     Strabismus Neg Hx     Thyroid disease Neg Hx      Social History     Tobacco Use    Smoking status: Never Smoker    Smokeless tobacco: Never Used   Substance Use Topics    Alcohol use: No     Alcohol/week: 0.0 standard drinks    Drug use: No     Review of Systems   Constitutional: Positive for fatigue. Negative for  chills and fever.   HENT: Negative for congestion and sinus pain.    Eyes: Negative for pain and visual disturbance.   Respiratory: Positive for shortness of breath. Negative for cough.    Cardiovascular: Positive for leg swelling (Chronic). Negative for chest pain.   Gastrointestinal: Negative for abdominal pain, blood in stool, constipation, diarrhea, nausea and vomiting.   Endocrine: Negative for polydipsia and polyuria.   Genitourinary: Negative for flank pain and hematuria.   Musculoskeletal: Negative for arthralgias and back pain.   Skin: Negative for color change and rash.   Neurological: Negative for dizziness and headaches.       Physical Exam     Initial Vitals [04/02/22 1802]   BP Pulse Resp Temp SpO2   (!) 103/51 (!) 50 18 97.7 °F (36.5 °C) 99 %      MAP       --         Physical Exam    Nursing note and vitals reviewed.  Constitutional: She appears well-developed. She is not diaphoretic. No distress.   Tired appearing female resting comfortably in bed in no acute distress   HENT:   Head: Normocephalic and atraumatic.   Eyes: Conjunctivae and EOM are normal. Pupils are equal, round, and reactive to light.   Neck: Neck supple.   Normal range of motion.  Cardiovascular: Regular rhythm, normal heart sounds and intact distal pulses.   No murmur heard.  Bradycardia   Pulmonary/Chest:   Slight tachypnea and increased work of breathing.  Bilateral wheezes   Abdominal: Abdomen is soft. She exhibits no distension. There is no abdominal tenderness. There is no rebound and no guarding.   Genitourinary:    Genitourinary Comments: Urinary catheter in place with stool surrounding it     Musculoskeletal:         General: No tenderness.      Cervical back: Normal range of motion and neck supple.      Comments: Bilateral peripheral edema up to ankles (reported as chronic)     Neurological: She is alert and oriented to person, place, and time.   Generalized weakness but no focal deficit   Skin: Skin is warm and dry.  Capillary refill takes less than 2 seconds. No rash noted. There is pallor.         ED Course   Procedures  Labs Reviewed   CBC W/ AUTO DIFFERENTIAL - Abnormal; Notable for the following components:       Result Value    RBC 2.91 (*)     Hemoglobin 8.5 (*)     Hematocrit 27.2 (*)     MCHC 31.3 (*)     Immature Granulocytes 4.1 (*)     Immature Grans (Abs) 0.40 (*)     Lymph % 14.9 (*)     All other components within normal limits   COMPREHENSIVE METABOLIC PANEL - Abnormal; Notable for the following components:    Sodium 117 (*)     Potassium 6.0 (*)     Chloride 91 (*)     CO2 16 (*)     Glucose 168 (*)     BUN 72 (*)     Creatinine 2.6 (*)     Calcium 8.3 (*)     Albumin 2.9 (*)     eGFR if  19 (*)     eGFR if non  17 (*)     All other components within normal limits    Narrative:        critical result(s) called and verbal readback obtained from   Alejandrina by LIZET 04/02/2022 19:35   B-TYPE NATRIURETIC PEPTIDE - Abnormal; Notable for the following components:     (*)     All other components within normal limits   CULTURE, BLOOD   CULTURE, BLOOD   CULTURE, BLOOD   CULTURE, BLOOD   LACTIC ACID, PLASMA   MAGNESIUM   TROPONIN I   SARS-COV-2 RNA AMPLIFICATION, QUAL   URINALYSIS, REFLEX TO URINE CULTURE   OSMOLALITY, SERUM   TSH   URIC ACID   CORTISOL, RANDOM   PROCALCITONIN   SARS-COV-2 RDRP GENE   POCT GLUCOSE MONITORING CONTINUOUS     EKG Readings: (Independently Interpreted)   Paced rhythm at a rate of 50, wide QRS, borderline prolonged QT, no STEMI       Imaging Results          X-Ray Chest AP Portable (Final result)  Result time 04/02/22 19:29:09    Final result by Greg Aguirre DO (04/02/22 19:29:09)                 Impression:      Chronic coarse interstitial prominence, similar to prior.  No large focal consolidation or significant detrimental change.      Electronically signed by: Greg Aguirre  Date:    04/02/2022  Time:    19:29             Narrative:     EXAMINATION:  XR CHEST AP PORTABLE    CLINICAL HISTORY:  Sepsis;    TECHNIQUE:  Single frontal view of the chest was performed.    COMPARISON:  02/24/2022.    FINDINGS:  There is a cardiac pacer/AICD, unchanged in position from prior.  There are lines and leads.    The lungs are well expanded.  There is coarse chronic interstitial prominence, similar to prior.  The pleural spaces are clear.    The cardiac silhouette is enlarged, unchanged.  There are atherosclerotic calcifications of the aortic arch.    The visualized osseous structures demonstrate degenerative changes.  There are remote right-sided rib fractures.                                 Medications   piperacillin-tazobactam 4.5 g in dextrose 5 % 100 mL IVPB (ready to mix system) (0 g Intravenous Stopped 4/2/22 1937)   vancomycin 1.25 g in dextrose 5% 250 mL IVPB (ready to mix) (1,250 mg Intravenous New Bag 4/2/22 1943)   sodium chloride 0.9% bolus 500 mL (500 mLs Intravenous New Bag 4/2/22 1942)   sodium chloride 0.9% flush 10 mL (has no administration in time range)   albuterol-ipratropium 2.5 mg-0.5 mg/3 mL nebulizer solution 3 mL (has no administration in time range)   melatonin tablet 6 mg (has no administration in time range)   ondansetron disintegrating tablet 8 mg (has no administration in time range)   ondansetron injection 4 mg (has no administration in time range)   senna-docusate 8.6-50 mg per tablet 1 tablet (has no administration in time range)   simethicone chewable tablet 80 mg (has no administration in time range)   aluminum-magnesium hydroxide-simethicone 200-200-20 mg/5 mL suspension 30 mL (has no administration in time range)   acetaminophen tablet 650 mg (has no administration in time range)   insulin aspart U-100 pen 0-5 Units (has no administration in time range)   glucose chewable tablet 16 g (has no administration in time range)   glucose chewable tablet 24 g (has no administration in time range)   glucagon (human recombinant)  "injection 1 mg (has no administration in time range)   heparin (porcine) injection 5,000 Units (has no administration in time range)   dextrose 10% bolus 125 mL (has no administration in time range)   dextrose 10% bolus 250 mL (has no administration in time range)   vancomycin - pharmacy to dose (has no administration in time range)   sodium chloride 0.9% bolus 500 mL (0 mLs Intravenous Stopped 4/2/22 1930)   albuterol-ipratropium 2.5 mg-0.5 mg/3 mL nebulizer solution 3 mL (3 mLs Nebulization Given 4/2/22 1945)     Medical Decision Making:   History:   Old Medical Records: I decided to obtain old medical records.  Old Records Summarized: records from clinic visits.  Initial Assessment:   81-year-old female with PMH of ischemic cardiomyopathy with pacemaker placement, CHF (EF 45%), diabetes, ABPA, COPD, and asthma presents with 1 day history of progressive generalized fatigue, hypotensive, bradycardic, and initially afebrile though later developed hypothermia, exam shows bilateral wheezes and a tired appearing female.  Septic workup initiated with broad-spectrum antibiotics.  30 cc/kg bolus is not given due to the patient's heart failure.  We will start with 500 cc of normal saline.  Differential Diagnosis:   Sepsis, UTI, pneumonia, bacteremia, hyponatremia, electrolyte abnormality, CHF exacerbation, ACS  Clinical Tests:   Lab Tests: Ordered and Reviewed  Radiological Study: Ordered and Reviewed  Medical Tests: Ordered and Reviewed  Sepsis Perfusion Assessment: "I attest a sepsis perfusion exam was performed within 6 hours of sepsis, severe sepsis, or septic shock presentation, following fluid resuscitation."    Sepsis Perfusion Assessment Complete: 4/2/2022 8:08 PM    ED Management:  See ED course             ED Course as of 04/02/22 2008   Sat Apr 02, 2022   1908 CBC auto differential(!)   CBC with baseline anemia, no leukocytosis, and normal platelet count [BD]   1934 Temp(!): 95.9 °F (35.5 °C)  Will place warm " blankets [BD]   1934 BP(!): 99/49  Giving additional 500cc bolus [BD]   1954 BNP(!): 308  Elevated but less than prior, inconsistent with CHF exacerbation [BD]   1955 Comprehensive metabolic panel(!!)  CMP remarkable for hyponatremia of 117 and hyperkalemia of 6.0 without EKG changes.  Baseline impaired renal function with creatinine 2.6.  Metabolic acidosis with normal anion gap.  Patient will be admitted to Hospital Medicine, likely the ICU given her hyponatremia.  She remains hemodynamically stable though hypotensive.  Admission is for sepsis and hyponatremia [BD]   1955 SARS-CoV-2 RNA, Amplification, Qual: Negative [BD]   1956 X-Ray Chest AP Portable  Chest x-ray shows chronic interstitial prominence similar prior exams without focal.  No pneumonia. [BD]      ED Course User Index  [BD] Dave Deluca MD             Clinical Impression:   Final diagnoses:  [I95.9] Hypotension  [E87.1] Hyponatremia (Primary)  [R07.9] Chest pain  [R65.10] SIRS (systemic inflammatory response syndrome)  [T68.XXXA] Hypothermia, initial encounter  [R53.83] Fatigue, unspecified type  [R00.1] Bradycardia  [E87.5] Hyperkalemia  [I50.43] Acute on chronic combined systolic and diastolic heart failure  [B44.81] ABPA (allergic bronchopulmonary aspergillosis)          ED Disposition Condition    Admit               Dave Deluca MD  Resident  04/02/22 2008

## 2022-04-02 NOTE — Clinical Note
Diagnosis: Hyponatremia [198519]   Future Attending Provider: TUYET MATHEW [[id number]]   Admitting Provider:: TUYET MATHEW [[id number]]   Special Needs:: No Special Needs [1]

## 2022-04-02 NOTE — TELEPHONE ENCOUNTER
Spoke with daughter of pt who is heart failure transitional care. Daughter states that pt BP 99/60, and that pt feels to weak to stand up at this time. Daughter also states that pt started taking voriconazole today, and has not been feeling well. Daughter also reported that pt has urinary catheter placed yesterday as well. Advised 911. Also Spoke with Dr. Oscar Pollard, regarding pt. Who stated that pt should be seen in ED. Daughter informed, and verbalized understanding, states will drive pt to ED.    Reason for Disposition   Shock suspected (e.g., cold/pale/clammy skin, too weak to stand, low BP, rapid pulse)    Additional Information   Negative: Started suddenly after an allergic medicine, an allergic food, or bee sting    Protocols used: BLOOD PRESSURE - LOW-A-AH

## 2022-04-02 NOTE — ED NOTES
Daughter reports decline in overall functional status since yesterday. Pt. Had recent hospitalization and was discharged home with indwelling catheter when she was unable to empty her bladder sufficiently. The catheter was removed again to trial voiding. She was seen in urologist office yesterday and it was replaced secondary to poor emptying. The patient was also diagnosed with uti and restarted on antibiotics. She was also diagnosed with fungal infection in her lungs and prescribed antibiotic to treat which she started today. After first dose she has had 3 episodes of diarrhea, no vomiting. She is also generally feeling weak and not well. Denies fever. Pt. Appears in mild distress.

## 2022-04-03 PROBLEM — N10 ACUTE PYELONEPHRITIS: Status: ACTIVE | Noted: 2022-04-02

## 2022-04-03 LAB
ALBUMIN SERPL BCP-MCNC: 2.5 G/DL (ref 3.5–5.2)
ALBUMIN SERPL BCP-MCNC: 2.8 G/DL (ref 3.5–5.2)
ALBUMIN/CREAT UR: 4516.7 UG/MG (ref 0–30)
ALLENS TEST: ABNORMAL
ANION GAP SERPL CALC-SCNC: 10 MMOL/L (ref 8–16)
ANION GAP SERPL CALC-SCNC: 11 MMOL/L (ref 8–16)
ANION GAP SERPL CALC-SCNC: 11 MMOL/L (ref 8–16)
ANION GAP SERPL CALC-SCNC: 9 MMOL/L (ref 8–16)
ANION GAP SERPL CALC-SCNC: 9 MMOL/L (ref 8–16)
BASOPHILS # BLD AUTO: 0.02 K/UL (ref 0–0.2)
BASOPHILS NFR BLD: 0.2 % (ref 0–1.9)
BUN SERPL-MCNC: 63 MG/DL (ref 8–23)
BUN SERPL-MCNC: 69 MG/DL (ref 8–23)
BUN SERPL-MCNC: 70 MG/DL (ref 8–23)
BUN SERPL-MCNC: 71 MG/DL (ref 8–23)
BUN SERPL-MCNC: 72 MG/DL (ref 8–23)
CALCIUM SERPL-MCNC: 7.8 MG/DL (ref 8.7–10.5)
CALCIUM SERPL-MCNC: 8.1 MG/DL (ref 8.7–10.5)
CALCIUM SERPL-MCNC: 8.1 MG/DL (ref 8.7–10.5)
CALCIUM SERPL-MCNC: 8.3 MG/DL (ref 8.7–10.5)
CALCIUM SERPL-MCNC: 8.4 MG/DL (ref 8.7–10.5)
CHLORIDE SERPL-SCNC: 91 MMOL/L (ref 95–110)
CHLORIDE SERPL-SCNC: 92 MMOL/L (ref 95–110)
CHLORIDE SERPL-SCNC: 93 MMOL/L (ref 95–110)
CHLORIDE SERPL-SCNC: 93 MMOL/L (ref 95–110)
CHLORIDE SERPL-SCNC: 96 MMOL/L (ref 95–110)
CHOLEST SERPL-MCNC: 96 MG/DL (ref 120–199)
CHOLEST/HDLC SERPL: 2.5 {RATIO} (ref 2–5)
CK SERPL-CCNC: 41 U/L (ref 20–180)
CO2 SERPL-SCNC: 15 MMOL/L (ref 23–29)
CO2 SERPL-SCNC: 16 MMOL/L (ref 23–29)
CO2 SERPL-SCNC: 19 MMOL/L (ref 23–29)
CORTIS SERPL-MCNC: 22.1 UG/DL
CREAT SERPL-MCNC: 2.6 MG/DL (ref 0.5–1.4)
CREAT SERPL-MCNC: 2.7 MG/DL (ref 0.5–1.4)
CREAT UR-MCNC: 12 MG/DL (ref 15–325)
CREAT UR-MCNC: 12 MG/DL (ref 15–325)
DELSYS: ABNORMAL
DIFFERENTIAL METHOD: ABNORMAL
EOSINOPHIL # BLD AUTO: 0.1 K/UL (ref 0–0.5)
EOSINOPHIL NFR BLD: 1.6 % (ref 0–8)
ERYTHROCYTE [DISTWIDTH] IN BLOOD BY AUTOMATED COUNT: 13.3 % (ref 11.5–14.5)
EST. GFR  (AFRICAN AMERICAN): 18 ML/MIN/1.73 M^2
EST. GFR  (AFRICAN AMERICAN): 19 ML/MIN/1.73 M^2
EST. GFR  (NON AFRICAN AMERICAN): 16 ML/MIN/1.73 M^2
EST. GFR  (NON AFRICAN AMERICAN): 17 ML/MIN/1.73 M^2
GLUCOSE SERPL-MCNC: 113 MG/DL (ref 70–110)
GLUCOSE SERPL-MCNC: 138 MG/DL (ref 70–110)
GLUCOSE SERPL-MCNC: 139 MG/DL (ref 70–110)
GLUCOSE SERPL-MCNC: 186 MG/DL (ref 70–110)
GLUCOSE SERPL-MCNC: 196 MG/DL (ref 70–110)
HCO3 UR-SCNC: 16.8 MMOL/L (ref 24–28)
HCT VFR BLD AUTO: 23.3 % (ref 37–48.5)
HCT VFR BLD CALC: 40 %PCV (ref 36–54)
HDLC SERPL-MCNC: 39 MG/DL (ref 40–75)
HDLC SERPL: 40.6 % (ref 20–50)
HGB BLD-MCNC: 14 G/DL
HGB BLD-MCNC: 7.9 G/DL (ref 12–16)
IMM GRANULOCYTES # BLD AUTO: 0.23 K/UL (ref 0–0.04)
IMM GRANULOCYTES NFR BLD AUTO: 2.8 % (ref 0–0.5)
LDLC SERPL CALC-MCNC: 29.8 MG/DL (ref 63–159)
LYMPHOCYTES # BLD AUTO: 1.5 K/UL (ref 1–4.8)
LYMPHOCYTES NFR BLD: 18.2 % (ref 18–48)
MAGNESIUM SERPL-MCNC: 2 MG/DL (ref 1.6–2.6)
MCH RBC QN AUTO: 29.5 PG (ref 27–31)
MCHC RBC AUTO-ENTMCNC: 33.9 G/DL (ref 32–36)
MCV RBC AUTO: 87 FL (ref 82–98)
MICROALBUMIN UR DL<=1MG/L-MCNC: 542 UG/ML
MODE: ABNORMAL
MONOCYTES # BLD AUTO: 0.6 K/UL (ref 0.3–1)
MONOCYTES NFR BLD: 7.6 % (ref 4–15)
NEUTROPHILS # BLD AUTO: 5.7 K/UL (ref 1.8–7.7)
NEUTROPHILS NFR BLD: 69.6 % (ref 38–73)
NONHDLC SERPL-MCNC: 57 MG/DL
NRBC BLD-RTO: 0 /100 WBC
OSMOLALITY SERPL: 277 MOSM/KG (ref 275–295)
OSMOLALITY SERPL: 279 MOSM/KG (ref 275–295)
OSMOLALITY SERPL: 281 MOSM/KG (ref 275–295)
OSMOLALITY UR: 174 MOSM/KG (ref 50–1200)
OSMOLALITY UR: 174 MOSM/KG (ref 50–1200)
OSMOLALITY UR: 277 MOSM/KG (ref 50–1200)
PCO2 BLDA: 31.5 MMHG (ref 35–45)
PH SMN: 7.33 [PH] (ref 7.35–7.45)
PHOSPHATE SERPL-MCNC: 4.8 MG/DL (ref 2.7–4.5)
PHOSPHATE SERPL-MCNC: 4.8 MG/DL (ref 2.7–4.5)
PHOSPHATE SERPL-MCNC: 4.9 MG/DL (ref 2.7–4.5)
PLATELET # BLD AUTO: 208 K/UL (ref 150–450)
PMV BLD AUTO: 9.9 FL (ref 9.2–12.9)
PO2 BLDA: 49 MMHG (ref 40–60)
POC BE: -9 MMOL/L
POC SATURATED O2: 83 % (ref 95–100)
POC TCO2: 18 MMOL/L (ref 24–29)
POCT GLUCOSE: 118 MG/DL (ref 70–110)
POCT GLUCOSE: 143 MG/DL (ref 70–110)
POCT GLUCOSE: 158 MG/DL (ref 70–110)
POCT GLUCOSE: 165 MG/DL (ref 70–110)
POCT GLUCOSE: 180 MG/DL (ref 70–110)
POTASSIUM BLD-SCNC: 5.4 MMOL/L (ref 3.5–5.1)
POTASSIUM SERPL-SCNC: 4.8 MMOL/L (ref 3.5–5.1)
POTASSIUM SERPL-SCNC: 5 MMOL/L (ref 3.5–5.1)
POTASSIUM SERPL-SCNC: 5 MMOL/L (ref 3.5–5.1)
POTASSIUM SERPL-SCNC: 5.3 MMOL/L (ref 3.5–5.1)
POTASSIUM SERPL-SCNC: 5.7 MMOL/L (ref 3.5–5.1)
PROT UR-MCNC: 96 MG/DL (ref 0–15)
PROT/CREAT UR: 8 MG/G{CREAT} (ref 0–0.2)
RBC # BLD AUTO: 2.68 M/UL (ref 4–5.4)
SAMPLE: ABNORMAL
SODIUM BLD-SCNC: 118 MMOL/L (ref 136–145)
SODIUM SERPL-SCNC: 116 MMOL/L (ref 136–145)
SODIUM SERPL-SCNC: 116 MMOL/L (ref 136–145)
SODIUM SERPL-SCNC: 119 MMOL/L (ref 136–145)
SODIUM SERPL-SCNC: 120 MMOL/L (ref 136–145)
SODIUM SERPL-SCNC: 124 MMOL/L (ref 136–145)
SODIUM UR-SCNC: <20 MMOL/L (ref 20–250)
TRIGL SERPL-MCNC: 136 MG/DL (ref 30–150)
URATE SERPL-MCNC: 7.6 MG/DL (ref 2.4–5.7)
WBC # BLD AUTO: 8.15 K/UL (ref 3.9–12.7)

## 2022-04-03 PROCEDURE — 84156 ASSAY OF PROTEIN URINE: CPT | Performed by: STUDENT IN AN ORGANIZED HEALTH CARE EDUCATION/TRAINING PROGRAM

## 2022-04-03 PROCEDURE — 83935 ASSAY OF URINE OSMOLALITY: CPT | Performed by: INTERNAL MEDICINE

## 2022-04-03 PROCEDURE — 82043 UR ALBUMIN QUANTITATIVE: CPT | Performed by: STUDENT IN AN ORGANIZED HEALTH CARE EDUCATION/TRAINING PROGRAM

## 2022-04-03 PROCEDURE — 94640 AIRWAY INHALATION TREATMENT: CPT

## 2022-04-03 PROCEDURE — 25000003 PHARM REV CODE 250: Performed by: HOSPITALIST

## 2022-04-03 PROCEDURE — 80061 LIPID PANEL: CPT | Performed by: STUDENT IN AN ORGANIZED HEALTH CARE EDUCATION/TRAINING PROGRAM

## 2022-04-03 PROCEDURE — 84300 ASSAY OF URINE SODIUM: CPT | Performed by: INTERNAL MEDICINE

## 2022-04-03 PROCEDURE — 25000003 PHARM REV CODE 250: Performed by: NURSE PRACTITIONER

## 2022-04-03 PROCEDURE — 84100 ASSAY OF PHOSPHORUS: CPT | Performed by: HOSPITALIST

## 2022-04-03 PROCEDURE — 99900035 HC TECH TIME PER 15 MIN (STAT)

## 2022-04-03 PROCEDURE — 83935 ASSAY OF URINE OSMOLALITY: CPT | Mod: 91 | Performed by: STUDENT IN AN ORGANIZED HEALTH CARE EDUCATION/TRAINING PROGRAM

## 2022-04-03 PROCEDURE — 20000000 HC ICU ROOM

## 2022-04-03 PROCEDURE — 84550 ASSAY OF BLOOD/URIC ACID: CPT | Performed by: STUDENT IN AN ORGANIZED HEALTH CARE EDUCATION/TRAINING PROGRAM

## 2022-04-03 PROCEDURE — 80069 RENAL FUNCTION PANEL: CPT | Performed by: STUDENT IN AN ORGANIZED HEALTH CARE EDUCATION/TRAINING PROGRAM

## 2022-04-03 PROCEDURE — 63600175 PHARM REV CODE 636 W HCPCS: Performed by: INTERNAL MEDICINE

## 2022-04-03 PROCEDURE — 85025 COMPLETE CBC W/AUTO DIFF WBC: CPT | Performed by: HOSPITALIST

## 2022-04-03 PROCEDURE — 25000003 PHARM REV CODE 250: Performed by: STUDENT IN AN ORGANIZED HEALTH CARE EDUCATION/TRAINING PROGRAM

## 2022-04-03 PROCEDURE — 25000242 PHARM REV CODE 250 ALT 637 W/ HCPCS: Performed by: STUDENT IN AN ORGANIZED HEALTH CARE EDUCATION/TRAINING PROGRAM

## 2022-04-03 PROCEDURE — 83930 ASSAY OF BLOOD OSMOLALITY: CPT | Performed by: STUDENT IN AN ORGANIZED HEALTH CARE EDUCATION/TRAINING PROGRAM

## 2022-04-03 PROCEDURE — 82550 ASSAY OF CK (CPK): CPT | Performed by: STUDENT IN AN ORGANIZED HEALTH CARE EDUCATION/TRAINING PROGRAM

## 2022-04-03 PROCEDURE — 83735 ASSAY OF MAGNESIUM: CPT | Performed by: HOSPITALIST

## 2022-04-03 PROCEDURE — 83930 ASSAY OF BLOOD OSMOLALITY: CPT | Mod: 91 | Performed by: STUDENT IN AN ORGANIZED HEALTH CARE EDUCATION/TRAINING PROGRAM

## 2022-04-03 PROCEDURE — 80048 BASIC METABOLIC PNL TOTAL CA: CPT | Performed by: INTERNAL MEDICINE

## 2022-04-03 PROCEDURE — 94761 N-INVAS EAR/PLS OXIMETRY MLT: CPT

## 2022-04-03 PROCEDURE — 80048 BASIC METABOLIC PNL TOTAL CA: CPT | Mod: 91 | Performed by: INTERNAL MEDICINE

## 2022-04-03 PROCEDURE — 25000242 PHARM REV CODE 250 ALT 637 W/ HCPCS: Performed by: NURSE PRACTITIONER

## 2022-04-03 PROCEDURE — 63600175 PHARM REV CODE 636 W HCPCS: Performed by: NURSE PRACTITIONER

## 2022-04-03 PROCEDURE — 27000190 HC CPAP FULL FACE MASK W/VALVE

## 2022-04-03 PROCEDURE — 99222 PR INITIAL HOSPITAL CARE,LEVL II: ICD-10-PCS | Mod: ,,, | Performed by: INTERNAL MEDICINE

## 2022-04-03 PROCEDURE — 36415 COLL VENOUS BLD VENIPUNCTURE: CPT | Performed by: STUDENT IN AN ORGANIZED HEALTH CARE EDUCATION/TRAINING PROGRAM

## 2022-04-03 PROCEDURE — 36415 COLL VENOUS BLD VENIPUNCTURE: CPT | Performed by: INTERNAL MEDICINE

## 2022-04-03 PROCEDURE — 80048 BASIC METABOLIC PNL TOTAL CA: CPT | Mod: 91 | Performed by: HOSPITALIST

## 2022-04-03 PROCEDURE — 36415 COLL VENOUS BLD VENIPUNCTURE: CPT | Performed by: HOSPITALIST

## 2022-04-03 PROCEDURE — 25000003 PHARM REV CODE 250: Performed by: INTERNAL MEDICINE

## 2022-04-03 PROCEDURE — 99222 1ST HOSP IP/OBS MODERATE 55: CPT | Mod: ,,, | Performed by: INTERNAL MEDICINE

## 2022-04-03 PROCEDURE — A4216 STERILE WATER/SALINE, 10 ML: HCPCS | Performed by: NURSE PRACTITIONER

## 2022-04-03 PROCEDURE — 82803 BLOOD GASES ANY COMBINATION: CPT

## 2022-04-03 RX ORDER — CARVEDILOL 25 MG/1
25 TABLET ORAL 2 TIMES DAILY WITH MEALS
Status: DISCONTINUED | OUTPATIENT
Start: 2022-04-03 | End: 2022-04-03

## 2022-04-03 RX ORDER — HYDRALAZINE HYDROCHLORIDE 25 MG/1
50 TABLET, FILM COATED ORAL 2 TIMES DAILY
Status: DISCONTINUED | OUTPATIENT
Start: 2022-04-03 | End: 2022-04-03

## 2022-04-03 RX ORDER — CARVEDILOL 25 MG/1
50 TABLET ORAL 2 TIMES DAILY WITH MEALS
Status: DISCONTINUED | OUTPATIENT
Start: 2022-04-03 | End: 2022-04-06 | Stop reason: HOSPADM

## 2022-04-03 RX ORDER — CARVEDILOL 12.5 MG/1
12.5 TABLET ORAL 2 TIMES DAILY WITH MEALS
Status: DISCONTINUED | OUTPATIENT
Start: 2022-04-03 | End: 2022-04-03

## 2022-04-03 RX ORDER — LOSARTAN POTASSIUM 50 MG/1
100 TABLET ORAL DAILY
Refills: 3 | Status: DISCONTINUED | OUTPATIENT
Start: 2022-04-03 | End: 2022-04-06 | Stop reason: HOSPADM

## 2022-04-03 RX ORDER — SODIUM CHLORIDE 9 MG/ML
INJECTION, SOLUTION INTRAVENOUS CONTINUOUS
Status: DISCONTINUED | OUTPATIENT
Start: 2022-04-03 | End: 2022-04-06 | Stop reason: HOSPADM

## 2022-04-03 RX ORDER — IPRATROPIUM BROMIDE AND ALBUTEROL SULFATE 2.5; .5 MG/3ML; MG/3ML
3 SOLUTION RESPIRATORY (INHALATION)
Status: DISCONTINUED | OUTPATIENT
Start: 2022-04-03 | End: 2022-04-06 | Stop reason: HOSPADM

## 2022-04-03 RX ORDER — FUROSEMIDE 40 MG/1
40 TABLET ORAL DAILY
Status: DISCONTINUED | OUTPATIENT
Start: 2022-04-03 | End: 2022-04-04

## 2022-04-03 RX ORDER — SODIUM BICARBONATE 650 MG/1
1300 TABLET ORAL 3 TIMES DAILY
Status: DISCONTINUED | OUTPATIENT
Start: 2022-04-03 | End: 2022-04-06 | Stop reason: HOSPADM

## 2022-04-03 RX ORDER — LABETALOL HYDROCHLORIDE 5 MG/ML
20 INJECTION, SOLUTION INTRAVENOUS ONCE
Status: COMPLETED | OUTPATIENT
Start: 2022-04-03 | End: 2022-04-03

## 2022-04-03 RX ORDER — HYDRALAZINE HYDROCHLORIDE 25 MG/1
100 TABLET, FILM COATED ORAL 2 TIMES DAILY
Status: DISCONTINUED | OUTPATIENT
Start: 2022-04-03 | End: 2022-04-05

## 2022-04-03 RX ORDER — 3% SODIUM CHLORIDE 3 G/100ML
20 INJECTION, SOLUTION INTRAVENOUS CONTINUOUS
Status: DISPENSED | OUTPATIENT
Start: 2022-04-03 | End: 2022-04-03

## 2022-04-03 RX ORDER — HYDRALAZINE HYDROCHLORIDE 25 MG/1
100 TABLET, FILM COATED ORAL 2 TIMES DAILY
Status: DISCONTINUED | OUTPATIENT
Start: 2022-04-03 | End: 2022-04-03

## 2022-04-03 RX ORDER — HYDRALAZINE HYDROCHLORIDE 25 MG/1
100 TABLET, FILM COATED ORAL 2 TIMES DAILY
Status: CANCELLED | OUTPATIENT
Start: 2022-04-03

## 2022-04-03 RX ORDER — 3% SODIUM CHLORIDE 3 G/100ML
15 INJECTION, SOLUTION INTRAVENOUS CONTINUOUS
Status: DISCONTINUED | OUTPATIENT
Start: 2022-04-03 | End: 2022-04-03

## 2022-04-03 RX ADMIN — IPRATROPIUM BROMIDE AND ALBUTEROL SULFATE 3 ML: .5; 3 SOLUTION RESPIRATORY (INHALATION) at 08:04

## 2022-04-03 RX ADMIN — PIPERACILLIN AND TAZOBACTAM 4.5 G: 4; .5 INJECTION, POWDER, LYOPHILIZED, FOR SOLUTION INTRAVENOUS; PARENTERAL at 02:04

## 2022-04-03 RX ADMIN — FUROSEMIDE 40 MG: 40 TABLET ORAL at 02:04

## 2022-04-03 RX ADMIN — IPRATROPIUM BROMIDE AND ALBUTEROL SULFATE 3 ML: .5; 2.5 SOLUTION RESPIRATORY (INHALATION) at 11:04

## 2022-04-03 RX ADMIN — SODIUM CHLORIDE 15 ML/HR: 3 INJECTION, SOLUTION INTRAVENOUS at 02:04

## 2022-04-03 RX ADMIN — IPRATROPIUM BROMIDE AND ALBUTEROL SULFATE 3 ML: .5; 3 SOLUTION RESPIRATORY (INHALATION) at 04:04

## 2022-04-03 RX ADMIN — Medication 10 ML: at 05:04

## 2022-04-03 RX ADMIN — HYDRALAZINE HYDROCHLORIDE 100 MG: 25 TABLET, FILM COATED ORAL at 06:04

## 2022-04-03 RX ADMIN — SODIUM BICARBONATE 1300 MG: 650 TABLET ORAL at 12:04

## 2022-04-03 RX ADMIN — CARVEDILOL 50 MG: 25 TABLET, FILM COATED ORAL at 04:04

## 2022-04-03 RX ADMIN — HEPARIN SODIUM 5000 UNITS: 5000 INJECTION INTRAVENOUS; SUBCUTANEOUS at 05:04

## 2022-04-03 RX ADMIN — ACETAMINOPHEN 650 MG: 325 TABLET ORAL at 06:04

## 2022-04-03 RX ADMIN — MUPIROCIN: 20 OINTMENT TOPICAL at 09:04

## 2022-04-03 RX ADMIN — LOSARTAN POTASSIUM 100 MG: 50 TABLET, FILM COATED ORAL at 02:04

## 2022-04-03 RX ADMIN — PRAVASTATIN SODIUM 40 MG: 40 TABLET ORAL at 09:04

## 2022-04-03 RX ADMIN — MUPIROCIN: 20 OINTMENT TOPICAL at 10:04

## 2022-04-03 RX ADMIN — SODIUM BICARBONATE 1300 MG: 650 TABLET ORAL at 02:04

## 2022-04-03 RX ADMIN — HEPARIN SODIUM 5000 UNITS: 5000 INJECTION INTRAVENOUS; SUBCUTANEOUS at 10:04

## 2022-04-03 RX ADMIN — SODIUM CHLORIDE: 0.9 INJECTION, SOLUTION INTRAVENOUS at 04:04

## 2022-04-03 RX ADMIN — DOCUSATE SODIUM AND SENNOSIDES 1 TABLET: 8.6; 5 TABLET, FILM COATED ORAL at 09:04

## 2022-04-03 RX ADMIN — HEPARIN SODIUM 5000 UNITS: 5000 INJECTION INTRAVENOUS; SUBCUTANEOUS at 02:04

## 2022-04-03 RX ADMIN — TIOTROPIUM BROMIDE INHALATION SPRAY 2 PUFF: 3.12 SPRAY, METERED RESPIRATORY (INHALATION) at 12:04

## 2022-04-03 RX ADMIN — IPRATROPIUM BROMIDE AND ALBUTEROL SULFATE 3 ML: .5; 2.5 SOLUTION RESPIRATORY (INHALATION) at 06:04

## 2022-04-03 RX ADMIN — PIPERACILLIN AND TAZOBACTAM 4.5 G: 4; .5 INJECTION, POWDER, LYOPHILIZED, FOR SOLUTION INTRAVENOUS; PARENTERAL at 06:04

## 2022-04-03 RX ADMIN — LABETALOL HYDROCHLORIDE 20 MG: 5 INJECTION INTRAVENOUS at 07:04

## 2022-04-03 RX ADMIN — SODIUM BICARBONATE 1300 MG: 650 TABLET ORAL at 10:04

## 2022-04-03 RX ADMIN — PIPERACILLIN AND TAZOBACTAM 4.5 G: 4; .5 INJECTION, POWDER, LYOPHILIZED, FOR SOLUTION INTRAVENOUS; PARENTERAL at 10:04

## 2022-04-03 RX ADMIN — Medication 10 ML: at 02:04

## 2022-04-03 RX ADMIN — Medication 10 ML: at 10:04

## 2022-04-03 RX ADMIN — CARVEDILOL 12.5 MG: 12.5 TABLET, FILM COATED ORAL at 09:04

## 2022-04-03 RX ADMIN — HYDRALAZINE HYDROCHLORIDE 50 MG: 25 TABLET, FILM COATED ORAL at 09:04

## 2022-04-03 NOTE — ASSESSMENT & PLAN NOTE
Chronic bronchitis, unspecified chronic bronchitis type      Follows Dr. Kelvin Maki with pulmonary and Dr. Catia Amaya with ID outpatient. Was seen by Dr. Amaya on 3/29 in clinic and was started on Vfend at that time. Patient stated she began this drug on today and her symptoms started soon afterwards.  Per chart review from IDs notes:  An 81-year-old woman with CAD, HTN, ischemic cardiomyopathy, HFrEF-45-50%, diastolic HF, s/p biventricular ICD, DM2 with polyneuropathy, COPD, and severe persistent asthma who was referred for management of allergic bronchopulmonary aspergillosis (ABPA). Mrs. Quinones has experienced chronic progressive shortness of breath with wheezing and congestion. She feels as though she has mucous production but is unable to expectorate. She has completed a short course of steroids without improvement as well as antibiotics such as doxycyline and Augmentin. She was evaluated by pulmonologist Dr. Maki with work up revealing elevated serum IgE levels, as well as mucous plugging on CT of the chest. Sputum cultures on 3/9/22 are positive for Aspergillus species.     --Will consult Pulmonary and ID while inpatient  --Will hold Vfend until evaluated by ID and pulmonary--cont to watch electrolytes   --She was started on IV Vancomycin and Zosyn empirically by the ED--will cont zosyn for now pending urine cultures  --Follow blood cultures   --CXR--Chronic coarse interstitial prominence, similar to prior.  No large focal consolidation or significant detrimental change  --Cont duo nebs and Spiriva home meds

## 2022-04-03 NOTE — PROGRESS NOTES
[facility] Medicine  Progress Note    Patient Name: Myesha Quinones  MRN: [id number]  Patient Class: IP- Inpatient   Admission Date: 4/2/2022  Length of Stay: 1 days  Attending Physician: Danish Reyes, *  Primary Care Provider: Catrachita Rothman MD        Subjective:     Principal Problem:Hyponatremia        HPI:  Ms. Myesha Quinones is a 82 y/o F with a pmh of ischemic cardiomyopathy with pacemaker placement, CHF (EF 45%), diabetes, ABPA, COPD, and asthma. Her PCP is Dr. Catrachita Rothman. She denies illicit drug use, alcohol consumption, and cigarette smoking.     The patient presented to the ED here at [facility] with complaints of generalized weakness.  The patient's daughter states she started Voriconazole this morning for Aspergillus infection and pretty soon afterwards started to feel really weak.  Her fatigue is new since this morning after consuming the Voriconazole per the patient's family who is at the bedside. The family states the fatigue is constant, progressive, and associated with difficulty with ambulation since this am.  The patient typically lives alone and cares for herself per the family.  The patient also reports mild shortness of breath--she is not on home O2.  She denies chest pain, headache, fever, abdominal pain, nausea, vomiting, hematuria, diarrhea, constipation, and black/bloody stools.  She was seen by Urology on yesterday and had a new catheter placed for urinary retention.  They obtained a dipstick and sent for culture but did not start the patient on any antibiotics because they wanted to see the sensitivities per chart review.      Her ED workup includes: hemoglobin 8.5, Na--117, K 6.0>6.1, CO2 16, BUN--72, Cr--2.6, calcium 8.3, albumin, uric acid 7.3, BNP--302, troponin 0.010, lactate 0.7. U/A positive for UTI. Blood cultures and urine cultures are pending. She was given IV Vancomycin and Zosyn in the ED. Duo nebs were given in the ED. She was also  given NS bolus in the ED. Lokelma was ordered to be given in the ED. Procal pending. CXR-- Chronic coarse interstitial prominence, similar to prior.  No large focal consolidation or significant detrimental change. We have consulted Nephrology, pulmonary, and ID while this patient is inpatient. This patient will be admitted to the ICU for [facility] service under the care of Brayan Frye NP under the collaboration of Dr. Caroline Anderson.      Overview/Hospital Course:  No notes on file    Interval History: reports tingling in hands and funny feeling in abdomen. Some shortness of breath. Overall does not appear as sick as would be expected based off labs.    Review of Systems   Constitutional:  Positive for appetite change and fatigue.   Respiratory:  Positive for cough and shortness of breath.    Cardiovascular:  Negative for chest pain.   Gastrointestinal:  Negative for abdominal pain and diarrhea.   Neurological:  Positive for weakness.   Objective:     Vital Signs (Most Recent):  Temp: 98 °F (36.7 °C) (04/03/22 1130)  Pulse: 90 (04/03/22 1530)  Resp: (!) 21 (04/03/22 1530)  BP: (!) 194/81 (04/03/22 1530)  SpO2: (!) 94 % (04/03/22 1530)   Vital Signs (24h Range):  Temp:  [94 °F (34.4 °C)-98 °F (36.7 °C)] 98 °F (36.7 °C)  Pulse:  [50-94] 90  Resp:  [18-44] 21  SpO2:  [77 %-99 %] 94 %  BP: ()/(49-81) 194/81     Weight: 65 kg (143 lb 4.8 oz)  Body mass index is 25.38 kg/m².    Intake/Output Summary (Last 24 hours) at 4/3/2022 1552  Last data filed at 4/3/2022 1421  Gross per 24 hour   Intake 1752.48 ml   Output 1830 ml   Net -77.52 ml      Physical Exam  Vitals and nursing note reviewed.   Constitutional:       Appearance: She is obese. She is ill-appearing.   HENT:      Head: Normocephalic and atraumatic.      Nose: Nose normal.      Mouth/Throat:      Mouth: Mucous membranes are moist.   Eyes:      Extraocular Movements: Extraocular movements intact.      Conjunctiva/sclera: Conjunctivae normal.      Pupils:  Pupils are equal, round, and reactive to light.   Cardiovascular:      Rate and Rhythm: Regular rhythm. Bradycardia present.      Pulses: Normal pulses.      Heart sounds: Normal heart sounds. No murmur heard.  Pulmonary:      Effort: Pulmonary effort is normal.      Breath sounds: Rhonchi present. No wheezing.   Abdominal:      General: Bowel sounds are normal. There is no distension.      Palpations: Abdomen is soft.      Tenderness: There is no abdominal tenderness. There is no guarding.   Musculoskeletal:         General: Swelling present.      Cervical back: Normal range of motion and neck supple.      Right lower leg: Edema present.      Left lower leg: Edema present.   Skin:     General: Skin is warm and dry.      Capillary Refill: Capillary refill takes 2 to 3 seconds.      Coloration: Skin is pale.      Findings: Bruising present. No erythema or rash.   Neurological:      Mental Status: She is alert and oriented to person, place, and time. Mental status is at baseline.   Psychiatric:         Mood and Affect: Mood normal.         Behavior: Behavior normal.         Thought Content: Thought content normal.         Judgment: Judgment normal.       Significant Labs: All pertinent labs within the past 24 hours have been reviewed.    Significant Imaging: I have reviewed all pertinent imaging results/findings within the past 24 hours.      Assessment/Plan:      * Hyponatremia  Holding Vfend  Na chronically low but today noted at 117  Patient is AAOx3  Fluid restriction ordered  ICU level care for monitoring overnight  - serum osm wnl, suggestive of pseudohyponatremia but unclear what alternative cause would be (normal protein, glucose, and TG; no report of surgical irrigant used during recent lauren replacement)  - repeat test to confirm  - Nephrology consulted, added bicarb    Sepsis  This patient does have evidence of infective focus  My overall impression is sepsis. Vital signs were reviewed and noted in progress  note.  Antibiotics given-   Antibiotics (From admission, onward)            Start     Stop Route Frequency Ordered    04/03/22 0245  piperacillin-tazobactam 4.5 g in dextrose 5 % 100 mL IVPB (ready to mix system)  (Sepsis Workup)         -- IV Every 8 hours (non-standard times) 04/02/22 2210 04/02/22 2130  mupirocin 2 % ointment         04/07 2059 Nasl 2 times daily 04/02/22 2018        Cultures were taken-   Microbiology Results (last 7 days)     Procedure Component Value Units Date/Time    Blood culture [[id number]] Collected: 04/02/22 2105    Order Status: Completed Specimen: Blood Updated: 04/03/22 0715     Blood Culture, Routine No Growth to date    Blood culture x two cultures. Draw prior to antibiotics. [[id number]] Collected: 04/02/22 1854    Order Status: Completed Specimen: Blood from Peripheral, Wrist, Left Updated: 04/03/22 0715     Blood Culture, Routine No Growth to date    Narrative:      Aerobic and anaerobic    Blood culture x two cultures. Draw prior to antibiotics. [[id number]] Collected: 04/02/22 1851    Order Status: Completed Specimen: Blood from Peripheral, Antecubital, Left Updated: 04/03/22 0715     Blood Culture, Routine No Growth to date    Narrative:      Aerobic and anaerobic    Urine culture [[id number]] Collected: 04/02/22 2108    Order Status: No result Specimen: Urine Updated: 04/02/22 2153    Blood culture [[id number]]     Order Status: Canceled Specimen: Blood         Latest lactate reviewed, they are-  Recent Labs   Lab 04/02/22  1847   LACTATE 0.7       Concerned for sepsis 2/2 hypothermia, tachypnea, and hypotension while in the ED  Source- pyelo     Source control Achieved by- IV Vancomycin and IV Zosyn. She was also given IVFs in the ED.  - de-escalate to zosyn for now, likely further de-escalation tomorrow pending culture        Acute pyelonephritis  UA dirty with WBC clumps  Cont empiric zosyn  Will follow urine cultures      ABPA (allergic bronchopulmonary  aspergillosis)  Chronic bronchitis, unspecified chronic bronchitis type      Follows Dr. Kelvin Maki with pulmonary and Dr. Catia Amaya with ID outpatient. Was seen by Dr. Amaya on 3/29 in clinic and was started on Vfend at that time. Patient stated she began this drug on today and her symptoms started soon afterwards.  Per chart review from IDs notes:  An 81-year-old woman with CAD, HTN, ischemic cardiomyopathy, HFrEF-45-50%, diastolic HF, s/p biventricular ICD, DM2 with polyneuropathy, COPD, and severe persistent asthma who was referred for management of allergic bronchopulmonary aspergillosis (ABPA). Mrs. Quinones has experienced chronic progressive shortness of breath with wheezing and congestion. She feels as though she has mucous production but is unable to expectorate. She has completed a short course of steroids without improvement as well as antibiotics such as doxycyline and Augmentin. She was evaluated by pulmonologist Dr. Maki with work up revealing elevated serum IgE levels, as well as mucous plugging on CT of the chest. Sputum cultures on 3/9/22 are positive for Aspergillus species.     --Will consult Pulmonary and ID while inpatient  --Will hold Vfend until evaluated by ID and pulmonary--cont to watch electrolytes   --She was started on IV Vancomycin and Zosyn empirically by the ED--will cont zosyn for now pending urine cultures  --Follow blood cultures   --CXR--Chronic coarse interstitial prominence, similar to prior.  No large focal consolidation or significant detrimental change  --Cont duo nebs and Spiriva home meds    Hypothermia  Warming blanket ordered  Temp range 94.3>97.7  - improved    Urinary retention  Follows Urology here at [facility]  De La Cruz catheter placed in clinic on day prior to admit  Bladder scan ordered prn  Will need strict I's and O's      Metabolic acidosis  - bicarb  - nephro following      Current mild episode of major depressive disorder without prior episode  Stable.  Calm on  exam.      Obstructive sleep apnea  Chronic. continue nightly CPAP      Chronic combined systolic and diastolic heart failure  Follows FAUSTINA Underwood in the cardiology clinic  Last TTE from 2/2022:  · The left ventricle is normal in size with mildly decreased systolic function.  · The estimated ejection fraction is 45-50%. There is left ventricular global hypokinesis.  · Grade II left ventricular diastolic dysfunction.  · Mild mitral regurgitation.  · Normal right ventricular size with normal right ventricular systolic function.  · Mild tricuspid regurgitation.  · The estimated PA systolic pressure is 46 mmHg.  · Normal central venous pressure (3 mmHg).  · There is pulmonary hypertension.  · Severe left atrial enlargement.    SBP range while in the ED in the 90's  - resume lasix    Type 2 diabetes mellitus with hyperglycemia, with long-term current use of insulin  Hemoglobin A1c 5.9 from 2/22/2022  POCT 4 times daily  Low dose SSI prn  Diabetic/ cardiac diet ordered       Moderate asthma  Cont duo nebs and Spiriva       Hyperlipidemia associated with type 2 diabetes mellitus  Cont pravastatin 40 mg PO daily       CKD (chronic kidney disease) stage 4, GFR 15-29 ml/min  Cr 2.6 which is at baseline  De La Cruz catheter was placed in the urology clinic for urinary retention  Family reports low urinary output  Strict I's and O's  Daily weights ordered  Bladder scan ordered prn  Nephrology consulted  Avoid nephrotoxic agents  Renally dose meds    Cardiomyopathy, ischemic  Chronic problem.  TTE from 2/22 reviewed.    Chronic obstructive pulmonary disease with acute exacerbation  Chronic problem  Will cont duo nebs  Not on home O2 per the family  Cont Spiriva daily at discharge, holding while on nebs      Iron deficiency anemia  Hemoglobin 8.5  Hemoglobin 8.7-9.1 on baseline  Follow     Hypertension associated with diabetes  - soft BP on admission but now uptrending  - resume home coreg, losartan, hydralazine, and lasix; can  consider further additions if remains elevated      Biventricular ICD (implantable cardioverter-defibrillator) in place  Chronic. No reported issues with this device.        VTE Risk Mitigation (From admission, onward)         Ordered     heparin (porcine) injection 5,000 Units  Every 8 hours         04/02/22 1953     IP VTE HIGH RISK PATIENT  Once         04/02/22 1953     Place sequential compression device  Until discontinued         04/02/22 1953                Discharge Planning   ALIX:      Code Status: Full Code   Is the patient medically ready for discharge?:     Reason for patient still in hospital (select all that apply): Patient trending condition, Laboratory test and Consult recommendations               Critical care time spent on the evaluation and treatment of severe organ dysfunction, review of pertinent labs and imaging studies, discussions with consulting providers and discussions with patient/family: 35 minutes.      Danish Reyes MD  Department of Hospital Medicine   [facility] Intensive [facility]

## 2022-04-03 NOTE — ASSESSMENT & PLAN NOTE
Follows FAUSTINA Underwood in the cardiology clinic  Last TTE from 2/2022:  · The left ventricle is normal in size with mildly decreased systolic function.  · The estimated ejection fraction is 45-50%. There is left ventricular global hypokinesis.  · Grade II left ventricular diastolic dysfunction.  · Mild mitral regurgitation.  · Normal right ventricular size with normal right ventricular systolic function.  · Mild tricuspid regurgitation.  · The estimated PA systolic pressure is 46 mmHg.  · Normal central venous pressure (3 mmHg).  · There is pulmonary hypertension.  · Severe left atrial enlargement.    SBP range while in the ED in the 90's  - josee lasix

## 2022-04-03 NOTE — EICU
EICU BRIEF ADMIT NOTE:    HISTORY: Please refer to H/P and ER notes for detail. Admitted with generalized weakness    CAMERA ASSESSMENT: Two way audiovisual assessment was done:     Telemetry was reviewed. Medical records including notes, labs and imaging were reviewed.    DISCUSSED with bedside nurse.    ASSESSMENT AND PLAN:    # ? Urinary tract infection/sepsis.  Started on empiric broad-spectrum antibiotics.  Continue pending culture results.  Due to history of CHF and rales on exam, one resuscitation was limited to 1 L of normal saline. Lactic acid normal.  Continue supportive care    # Hyponatremia. Workup for SIADH ordered.  Associated with hyperkalemia.?  Adrenal insufficiency.  Serum cortisol and TSH levels pending.  We will start normal saline 50 cc/h and monitor BMP closely.   Fluid restriction    # CKD.  Creatinine 2.6,close to baseline.   # Hyperkalemia.   IV insulin with dextrose and Lokelma ordered.  Follow potassium level ordered.     BEST PRACTICES REVIEW:    INTUBATED: No  GLYCEMIN CONTROL:  ON SSI  STRESS ULCER PROPHYLAXIS: not indicated  DVT PROPHYLAXIS:   Subcutaneous heparin    Thank You for allowing EICU to participate in the care of the patient. Please call as needed      Manuel Gaspar MD  [facility]  [phone]    2:19 AM    Potassium has decreased to 5.7  Sodium remains at 116, no improvement with normal saline at 50 cc/h.  Discontinue normal saline.  3% saline, 15 cc per hour for 3 hours and repeat BMP    5:21 AM    Sodium still at 116--- increase 3% saline to 20 cc/h and repeat BMP

## 2022-04-03 NOTE — CONSULTS
Nephrology Consult  H&P      Consult Requested By: Danish Reyes, *  Reason for Consult: VIRGILIO     SUBJECTIVE:     History of Present Illness:  Myesha Quinones is a 81 y.o.   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, with long-term current use of insulin (02/22/2018), COPD (chronic obstructive pulmonary disease), COPD exacerbation (04/08/2018), Current mild episode of major depressive disorder without prior episode (01/25/2022), Defibrillator discharge, Diabetes mellitus, Diabetes mellitus type II, Diabetes with neurologic complications, Goiter, breast cancer, Hyperlipidemia, Hypertension, Iron deficiency anemia (05/16/2017), Left kidney mass, Meningioma, Microalbuminuria due to type 2 diabetes mellitus (01/26/2022), Osteoporosis, postmenopausal, Pneumonia (12/08/2019), 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 The patient presented to the [facility] on 4/2/2022 with a primary complaint of weakness not feeling well, now treated  as Sepsis UTI, also found to have Hyponatremia. ?    Review of Systems   Constitutional: Negative for chills  and fever.   HENT: Negative for congestion and sore throat.    Eyes: Negative for blurred vision, double vision and photophobia.   Respiratory: Negative for cough and shortness of breath.    Cardiovascular: Negative for chest pain, palpitations and leg swelling.   Gastrointestinal: Negative for abdominal pain, diarrhea, nausea and vomiting.   Genitourinary: Negative for dysuria and urgency.   Musculoskeletal: Negative for joint pain and myalgias.   Skin: Negative for itching and rash.   Neurological: Positive for weakness (chronic ). Negative for dizziness, sensory change and headaches.   Endo/Heme/Allergies: Negative for polydipsia. Does not bruise/bleed easily.   Psychiatric/Behavioral: Negative for depression.       Past Medical History:   Diagnosis Date    Acute hypoxemic respiratory failure 12/19/2019    Acute right-sided thoracic back pain 12/09/2019    Allergy     Asthma     Basal cell carcinoma     left forehead    Basal cell carcinoma     left nose    Basal cell carcinoma 05/20/2015    right nose    Basal cell carcinoma 12/22/2015    left lower post neck    Basal cell carcinoma 12/03/2019    left ant scalp     Breast cancer     CAD (coronary artery disease)     Cardiomyopathy     Cardiomyopathy, ischemic     Cataract     CHF (congestive heart failure)     Chronic kidney disease, stage 3, mod decreased GFR 02/14/2017    Colon polyp 2011    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, with long-term current use of insulin 02/22/2018    COPD (chronic obstructive pulmonary disease)     COPD exacerbation 04/08/2018    Current mild episode of major depressive disorder without prior episode 01/25/2022    Defibrillator discharge     Diabetes mellitus     Diabetes mellitus type II     Diabetes with neurologic complications     Goiter     MNG    HX: breast cancer     Hyperlipidemia     Hypertension     Iron deficiency  anemia 05/16/2017    Left kidney mass     Meningioma     Microalbuminuria due to type 2 diabetes mellitus 01/26/2022    Osteoporosis, postmenopausal     Pneumonia 12/08/2019    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    Proteinuria 1/21/2019    Pseudomonas pneumonia     Skin cancer     s/p excision    Sleep apnea     CPAP    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/2015    mid forehead    Unspecified vitamin D deficiency     UTI (urinary tract infection) 4/2/2022    Ventricular tachycardia     Vitamin B12 deficiency     Vitamin D deficiency disease      Past Surgical History:   Procedure Laterality Date    BASAL CELL CARCINOMA EXCISION      posterior neck and nose    BREAST BIOPSY      BREAST CYST EXCISION Left     BREAST SURGERY      CARDIAC DEFIBRILLATOR PLACEMENT      x 2    CATARACT EXTRACTION W/  INTRAOCULAR LENS IMPLANT Bilateral     CHOLECYSTECTOMY      COLONOSCOPY N/A 11/5/2019    Procedure: COLONOSCOPY;  Surgeon: Boaz Botello MD;  Location: [facility] ENDO ([address] [place] [address]);  Service: Endoscopy;  Laterality: N/A;  AICD - [employer] -     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    FRACTURE SURGERY      left elbow and wrist as a child    HYSTERECTOMY      MASTECTOMY Right     REPLACEMENT OF IMPLANTABLE CARDIOVERTER-DEFIBRILLATOR (ICD) GENERATOR N/A 12/17/2018    Procedure: REPLACEMENT, ICD GENERATOR;  Surgeon: Jan Mckeon MD;  Location: [facility] EP LAB;  Service: Cardiology;  Laterality: N/A;  DONNA, CRT-D gen change, MDT, MAC, SK, 3 Prep    REVISION OF SKIN POCKET FOR CARDIOVERTER-DEFIBRILLATOR  12/17/2018    Procedure: Revision, Skin Pocket, For Cardioverter-Defibrillator;  Surgeon: Jan Mckeon MD;  Location: [facility] EP LAB;  Service: Cardiology;;    SQUAMOUS CELL CARCINOMA EXCISION      remved from forehead    TONSILLECTOMY       Family History   Problem Relation Age of Onset    Diabetes Father     Heart disease Father     Diabetes Sister     Heart disease Sister     Diabetes Brother      Heart disease Brother     Hypertension Brother     Diabetes Brother     Heart disease Brother     Hypertension Brother     Diabetes Brother     Heart disease Brother     Cancer Brother         colon    Diabetes Brother     Cancer Son         skin    Diabetes Son         prediabetes    Diabetes Daughter         prediabetes    Cancer Daughter         melanoma    Obesity Daughter     Melanoma Daughter     Diabetes Son     Asthma Mother     Hypertension Mother     Stroke Mother     No Known Problems Maternal Grandmother     No Known Problems Maternal Grandfather     No Known Problems Paternal Grandmother     No Known Problems Paternal Grandfather     Amblyopia Neg Hx     Blindness Neg Hx     Cataracts Neg Hx     Glaucoma Neg Hx     Macular degeneration Neg Hx     Retinal detachment Neg Hx     Strabismus Neg Hx     Thyroid disease Neg Hx      Social History     Tobacco Use    Smoking status: Never Smoker    Smokeless tobacco: Never Used   Substance Use Topics    Alcohol use: No     Alcohol/week: 0.0 standard drinks    Drug use: No       Review of patient's allergies indicates:   Allergen Reactions    Iodine and iodide containing products Hives    Nifedipine      weakness            OBJECTIVE:     Vital Signs (Most Recent)  Vitals:    04/03/22 0700 04/03/22 0715 04/03/22 0800 04/03/22 1000   BP: (!) 179/70  (!) 192/79 (!) 176/76   BP Location: Left arm  Left arm Left arm   Patient Position: Lying  Lying Lying   Pulse: 71 72 72 77   Resp: (!) 28 (!) 23 (!) 23 (!) 23   Temp:  97.6 °F (36.4 °C)     TempSrc:  Oral     SpO2: 95% (!) 94% (!) 92% (!) 93%   Weight:       Height:             Date 04/03/22 0700 - 04/04/22 0659   Shift 4969-8971 7717-0034 5766-1977 24 Hour Total   INTAKE   I.V.(mL/kg) 25.9(0.4)   25.9(0.4)   IV Piggyback 5.7   5.7   Shift Total(mL/kg) 31.6(0.5)   31.6(0.5)   OUTPUT   Urine(mL/kg/hr) 775   775   Shift Total(mL/kg) 775(11.9)   775(11.9)   Weight (kg) 65 65 65 65            Medications:   carvediloL  12.5 mg Oral BID WM    heparin (porcine)  5,000 Units Subcutaneous Q8H    hydrALAZINE  50 mg Oral BID    mupirocin   Nasal BID    piperacillin-tazobactam (ZOSYN) IVPB  4.5 g Intravenous Q8H    pravastatin  40 mg Oral Daily    senna-docusate 8.6-50 mg  1 tablet Oral BID    sodium bicarbonate  1,300 mg Oral TID    sodium chloride 0.9%  10 mL Intravenous Q8H    tiotropium bromide  2 puff Inhalation Daily           Physical Exam  Vitals and nursing note reviewed.   Constitutional:       General: She is not in acute distress.     Appearance: She is ill-appearing. She is not diaphoretic.   HENT:      Head: Normocephalic and atraumatic.      Mouth/Throat:      Pharynx: No oropharyngeal exudate.   Eyes:      General: No scleral icterus.     Conjunctiva/sclera: Conjunctivae normal.      Pupils: Pupils are equal, round, and reactive to light.   Cardiovascular:      Rate and Rhythm: Normal rate and regular rhythm.      Heart sounds: Normal heart sounds. No murmur heard.  Pulmonary:      Effort: Pulmonary effort is normal. No respiratory distress.      Breath sounds: Rales (RLL) present.   Abdominal:      General: Bowel sounds are normal. There is no distension.      Palpations: Abdomen is soft.      Tenderness: There is no abdominal tenderness.   Musculoskeletal:         General: Swelling present. Normal range of motion.      Cervical back: Normal range of motion and neck supple.      Right lower leg: Edema present.      Left lower leg: Edema present.   Skin:     General: Skin is warm and dry.      Findings: No erythema.   Neurological:      Mental Status: She is alert and oriented to person, place, and time.      Cranial Nerves: No cranial nerve deficit.   Psychiatric:         Mood and Affect: Affect normal.         Cognition and Memory: Memory normal.         Judgment: Judgment normal.         Laboratory:  Recent Labs   Lab 04/02/22  1849 04/03/22  0334 04/03/22  0338   WBC 9.83  8.15  --    HGB 8.5* 7.9*  --    HCT 27.2* 23.3* 40    208  --    MONO 7.1  0.7 7.6  0.6  --      Recent Labs   Lab 04/03/22  0052 04/03/22  0334 04/03/22  0801   * 116* 120*   K 5.7* 5.3* 5.0   CL 91* 92* 93*   CO2 15* 15* 16*   BUN 72* 71* 69*   CREATININE 2.6* 2.6* 2.6*   CALCIUM 7.8* 8.1* 8.1*   PHOS  --  4.9*  --        Diagnostic Results:  X-Ray: Reviewed  US: Reviewed  Echo: Reviewed  ASSESSMENT/PLAN:     1. VIRGILIO - [place] CKD4?5 [place] [facility] [place] to DM/HTN has proteinuria nephrotic range.    -- Cr ?  baseline 2.6 had multiple admission and VIRGILIO   -- Please avoid nephrotoxins, including NSAIDs, aminoglycosides, IV contrast (unless absolutely necessary), gadolinium, fleets and other phosphorous-based laxatives. Caution with antibiotics.  -- Daily Renal Function Panel  -- Avoid Hypotension.  -- Renally dose all meds  -- Avoid Vanc Zosyn Combo unless absolutely  indicated   Hyponatremia    Not clear etiology at this point -? 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 HTN (I10) - elevated but in concern for Sepsis monitor for now   -- resume lasix first   3. Anemia of chronic kidney disease   -- check iron levels   Recent Labs   Lab 04/02/22  1849 04/03/22  0334 04/03/22  0338   HGB 8.5* 7.9*  --    HCT 27.2* 23.3* 40    208  --        Iron   Lab Results   Component Value Date    IRON 41 10/21/2020    TIBC 425 10/21/2020    FERRITIN 95 12/12/2020       4. MBD (E88.9 M90.80) -  Recent Labs   Lab 04/03/22  0334 04/03/22  0801   CALCIUM 8.1* 8.1*   PHOS 4.9*  --      Recent Labs   Lab 04/02/22  1849 04/03/22  0334   MG 2.2 2.0     Acidosis   Lab Results   Component Value Date    PTH 72.0 03/26/2019     CALCIUM 8.1 (L) 04/03/2022    PHOS 4.9 (H) 04/03/2022     Lab Results   Component Value Date    [id number] 31 01/25/2022       Lab Results   Component Value Date    CO2 16 (L) 04/03/2022   -- PO bicarbonate     5. Nutrition/Hypoalbuminemia (E88.09) -    Recent Labs   Lab 04/02/22  1849   ALBUMIN 2.9*     Nepro with meals TID. Renal vitamins daily    Total critical care time 45 minutes: included management of organ failures related to critical illness, titration of continuous infusions, review of pertinent labs and imaging studies, discussion with primary team and family.    Thank you for the consult, will follow  With any question please call [phone]  Anna Fairbanks MD    [facility]  YONG Austin MD, FACESTRELLA DE SOUZA MD,   MD DAYANNA Isabel MD E. V. Harmon, NP    [address]  [place], [id number] [phone]

## 2022-04-03 NOTE — H&P
[facility]  History & Physical    Patient Name: Myesha Quinones  MRN: [id number]  Patient Class: IP- Inpatient  Admission Date: 4/2/2022  Attending Physician: Caroline Anderson MD   Primary Care Provider: Catrachita Rothman MD         Patient information was obtained from patient, relative(s), past medical records and ER records.     Subjective:     Principal Problem:Sepsis    Chief Complaint:   Chief Complaint   Patient presents with    Hypotension     Pt's granddaughter reports pt was seen at urology yesterday for catheter placement due to infection. Pt was also started on a new medication by pulmonary. She has had weakness, low bp and low heart rate all day.         HPI: Ms. Myesha Quinones is a 82 y/o F with a pmh of ischemic cardiomyopathy with pacemaker placement, CHF (EF 45%), diabetes, ABPA, COPD, and asthma. Her PCP is Dr. Catrachita Rothman. She denies illicit drug use, alcohol consumption, and cigarette smoking.     The patient presented to the ED here at [facility] with complaints of generalized weakness.  The patient's daughter states she started Voriconazole this morning for Aspergillus infection and pretty soon afterwards started to feel really weak.  Her fatigue is new since this morning after consuming the Voriconazole per the patient's family who is at the bedside. The family states the fatigue is constant, progressive, and associated with difficulty with ambulation since this am.  The patient typically lives alone and cares for herself per the family.  The patient also reports mild shortness of breath--she is not on home O2.  She denies chest pain, headache, fever, abdominal pain, nausea, vomiting, hematuria, diarrhea, constipation, and black/bloody stools.  She was seen by Urology on yesterday and had a new catheter placed for urinary retention.  They obtained a dipstick and sent for culture but did not start the patient on any antibiotics because they wanted to see the  sensitivities per chart review.      Her ED workup includes: hemoglobin 8.5, Na--117, K 6.0>6.1, CO2 16, BUN--72, Cr--2.6, calcium 8.3, albumin, uric acid 7.3, BNP--302, troponin 0.010, lactate 0.7. U/A positive for UTI. Blood cultures and urine cultures are pending. She was given IV Vancomycin and Zosyn in the ED. Duo nebs were given in the ED. She was also given NS bolus in the ED. Lokelma was ordered to be given in the ED. Procal pending. CXR-- Chronic coarse interstitial prominence, similar to prior.  No large focal consolidation or significant detrimental change. We have consulted Nephrology, pulmonary, and ID while this patient is inpatient. This patient will be admitted to the ICU for [facility]- service under the care of Brayan Frye NP under the collaboration of Dr. Caroline Anderson.      Past Medical History:   Diagnosis Date    Acute hypoxemic respiratory failure 12/19/2019    Acute right-sided thoracic back pain 12/09/2019    Allergy     Asthma     Basal cell carcinoma     left forehead    Basal cell carcinoma     left nose    Basal cell carcinoma 05/20/2015    right nose    Basal cell carcinoma 12/22/2015    left lower post neck    Basal cell carcinoma 12/03/2019    left ant scalp     Breast cancer     CAD (coronary artery disease)     Cardiomyopathy     Cardiomyopathy, ischemic     Cataract     CHF (congestive heart failure)     Chronic kidney disease, stage 3, mod decreased GFR 02/14/2017    Colon polyp 2011    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, with long-term current use of insulin 02/22/2018    COPD (chronic obstructive pulmonary disease)     COPD exacerbation 04/08/2018    Current mild episode of major depressive disorder without prior episode 01/25/2022    Defibrillator discharge     Diabetes mellitus     Diabetes mellitus type II     Diabetes with neurologic complications     Goiter     MNG     HX: breast cancer     Hyperlipidemia     Hypertension     Iron deficiency anemia 05/16/2017    Left kidney mass     Meningioma     Microalbuminuria due to type 2 diabetes mellitus 01/26/2022    Osteoporosis, postmenopausal     Pneumonia 12/08/2019    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    Proteinuria 1/21/2019    Pseudomonas pneumonia     Skin cancer     s/p excision    Sleep apnea     CPAP    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/2015    mid forehead    Unspecified vitamin D deficiency     Ventricular tachycardia     Vitamin B12 deficiency     Vitamin D deficiency disease        Past Surgical History:   Procedure Laterality Date    BASAL CELL CARCINOMA EXCISION      posterior neck and nose    BREAST BIOPSY      BREAST CYST EXCISION Left     BREAST SURGERY      CARDIAC DEFIBRILLATOR PLACEMENT      x 2    CATARACT EXTRACTION W/  INTRAOCULAR LENS IMPLANT Bilateral     CHOLECYSTECTOMY      COLONOSCOPY N/A 11/5/2019    Procedure: COLONOSCOPY;  Surgeon: Boaz Botello MD;  Location: [facility] ([address] [place] [address]);  Service: Endoscopy;  Laterality: N/A;  AICD - [employer] -     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    FRACTURE SURGERY      left elbow and wrist as a child    HYSTERECTOMY      MASTECTOMY Right     REPLACEMENT OF IMPLANTABLE CARDIOVERTER-DEFIBRILLATOR (ICD) GENERATOR N/A 12/17/2018    Procedure: REPLACEMENT, ICD GENERATOR;  Surgeon: Jan Mckeon MD;  Location: [facility];  Service: Cardiology;  Laterality: N/A;  DONNA, CRT-D gen change, MDT, MAC, SK, 3 Prep    REVISION OF SKIN POCKET FOR CARDIOVERTER-DEFIBRILLATOR  12/17/2018    Procedure: Revision, Skin Pocket, For Cardioverter-Defibrillator;  Surgeon: Jan Mckeon MD;  Location: [facility];  Service: Cardiology;;    SQUAMOUS CELL CARCINOMA EXCISION      remved from forehead    TONSILLECTOMY         Review of patient's allergies indicates:   Allergen Reactions    Iodine and iodide containing products Hives     Nifedipine      weakness       No current facility-administered medications on file prior to encounter.     Current Outpatient Medications on File Prior to Encounter   Medication Sig    albuterol (PROVENTIL/VENTOLIN HFA) 90 mcg/actuation inhaler INHALE 2 PUFFS INTO THE LUNGS EVERY 6 (SIX) HOURS AS NEEDED FOR WHEEZING. RESCUE    albuterol-ipratropium (DUO-NEB) 2.5 mg-0.5 mg/3 mL nebulizer solution TAKE 3 MLS BY NEBULIZATION EVERY 4 (FOUR) HOURS AS NEEDED FOR WHEEZING.    ascorbic acid, vitamin C, (VITAMIN C) 100 MG tablet Take 100 mg by mouth once daily.    benzonatate (TESSALON) 100 MG capsule Take 1 capsule (100 mg total) by mouth 3 (three) times daily as needed for Cough.    blood sugar diagnostic (ACCU-CHEK HECTOR) Strp Uses Accu-Check Hector meter to test BG 4x/day    carvediloL (COREG) 25 MG tablet TAKE 2 TABLETS (50 MG TOTAL) BY MOUTH 2 (TWO) TIMES DAILY.    cholecalciferol, vitamin D3, (VITAMIN D3) 50 mcg (2,000 unit) Tab Take 1 tablet by mouth once daily.     cloNIDine 0.1 mg/24 hr td ptwk (CATAPRES) 0.1 mg/24 hr Place 1 patch onto the skin every Tuesday.    CYANOCOBALAMIN, VITAMIN B-12, (B-12 DOTS ORAL) Take 1 tablet by mouth once daily.    diltiaZEM (CARDIZEM CD) 240 MG 24 hr capsule Take 1 capsule (240 mg total) by mouth once daily.    FLUAD QUAD 2021-22,65Y UP,,PF, 60 mcg (15 mcg x 4)/0.5 mL Syrg     fluticasone propionate (FLONASE) 50 mcg/actuation nasal spray 2 sprays (100 mcg total) by Each Nostril route once daily.    fluticasone-salmeterol diskus inhaler 250-50 mcg Inhale 1 puff into the lungs 2 (two) times daily. Controller    furosemide (LASIX) 20 MG tablet Take 1 tablet (20 mg total) by mouth once daily.    hydrALAZINE (APRESOLINE) 100 MG tablet Take 0.5 tablets (50 mg total) by mouth 2 (two) times daily.    insulin (LANTUS SOLOSTAR U-100 INSULIN) glargine 100 units/mL (3mL) SubQ pen Inject into the skin.    irbesartan (AVAPRO) 300 MG tablet Take 1 tablet (300 mg total) by mouth  "every evening.    lancets (ACCU-CHEK SOFTCLIX LANCETS) Misc Uses Accu-Chek Angelica meter to test BG 1x/day    levocetirizine (XYZAL) 5 MG tablet Take 5 mg by mouth every evening.    magnesium oxide (MAG-OX) 400 mg tablet Take 1 tablet (400 mg total) by mouth once daily.    montelukast (SINGULAIR) 10 mg tablet Take 1 tablet (10 mg total) by mouth every evening.    mucus clearing device (ACAPELLA, FLUTTER) by Misc.(Non-Drug; Combo Route) route 4 (four) times daily as needed (to relieve mucous).    nitroGLYCERIN (NITROSTAT) 0.4 MG SL tablet Place 0.4 mg under the tongue every 5 (five) minutes as needed for Chest pain.     omega-3 fatty acids 500 mg Cap Take 1 capsule by mouth Twice daily.    pen needle, diabetic (BD ULTRA-FINE MINI PEN NEEDLE) 31 gauge x 3/16" Ndle USE WITH LANTUS AT BEDTIME    polyethylene glycol (GLYCOLAX) 17 gram PwPk Take 17 g by mouth daily as needed (constipation).    pravastatin (PRAVACHOL) 40 MG tablet Take 1 tablet (40 mg total) by mouth once daily.    pulse oximeter (PULSE OXIMETER) device by Apply Externally route 2 (two) times a day. Use twice daily at 8 AM and 3 PM and record the value in [facility] as directed.    sodium chloride 3% 3 % nebulizer solution TAKE 4 MLS BY NEBULIZATION 4 (FOUR) TIMES DAILY AS NEEDED FOR OTHER OR COUGH (TO INDUCE SPUTUM AND CLEAR MUCOUS.).    tiotropium (SPIRIVA) 18 mcg inhalation capsule Inhale 1 capsule (18 mcg total) into the lungs once daily. Controller    voriconazole (VFEND) 200 MG Tab Take two tablets (400 mg) twice per day the first day then take one tablet (200 mg) twice per day starting the second day     Family History       Problem Relation (Age of Onset)    Asthma Mother    Cancer Brother, Son, Daughter    Diabetes Father, Sister, Brother, Brother, Brother, Brother, Son, Daughter, Son    Heart disease Father, Sister, Brother, Brother, Brother    Hypertension Brother, Brother, Mother    Melanoma Daughter    No Known Problems Maternal " Grandmother, Maternal Grandfather, Paternal Grandmother, Paternal Grandfather    Obesity Daughter    Stroke Mother          Tobacco Use    Smoking status: Never Smoker    Smokeless tobacco: Never Used   Substance and Sexual Activity    Alcohol use: No     Alcohol/week: 0.0 standard drinks    Drug use: No    Sexual activity: Yes     Partners: Male     Review of Systems   Constitutional:  Positive for activity change, appetite change and fatigue. Negative for chills, diaphoresis and fever.   HENT:  Positive for hearing loss. Negative for congestion and trouble swallowing.    Eyes:  Negative for photophobia and visual disturbance.   Respiratory:  Positive for cough and shortness of breath.    Cardiovascular:  Positive for leg swelling. Negative for chest pain and palpitations.   Gastrointestinal:  Negative for abdominal distention, abdominal pain, anal bleeding, blood in stool, constipation, diarrhea, nausea and vomiting.   Genitourinary:  Positive for difficulty urinating. Negative for hematuria.        Noted urinary retention--lauren placed by urologist on yesterday   Musculoskeletal:  Positive for gait problem. Negative for arthralgias and myalgias.   Skin:  Negative for rash and wound.   Allergic/Immunologic: Positive for immunocompromised state.   Neurological:  Positive for weakness. Negative for facial asymmetry, light-headedness, numbness and headaches.   Hematological:  Does not bruise/bleed easily.   Psychiatric/Behavioral:  Negative for agitation and confusion. The patient is not nervous/anxious.    Objective:     Vital Signs (Most Recent):  Temp: (!) 94.3 °F (34.6 °C) (04/02/22 2232)  Pulse: (!) 50 (04/02/22 2050)  Resp: 20 (04/02/22 2050)  BP: (!) 136/59 (04/02/22 2050)  SpO2: 99 % (04/02/22 1945)   Vital Signs (24h Range):  Temp:  [94.3 °F (34.6 °C)-97.7 °F (36.5 °C)] 94.3 °F (34.6 °C)  Pulse:  [50] 50  Resp:  [18-44] 20  SpO2:  [96 %-99 %] 99 %  BP: ()/(49-59) 136/59     Weight: 64 kg (141  lb)  Body mass index is 24.98 kg/m².    Physical Exam  Vitals and nursing note reviewed.   Constitutional:       Appearance: She is obese. She is ill-appearing.   HENT:      Head: Normocephalic and atraumatic.      Nose: Nose normal.      Mouth/Throat:      Mouth: Mucous membranes are moist.   Eyes:      Extraocular Movements: Extraocular movements intact.      Conjunctiva/sclera: Conjunctivae normal.      Pupils: Pupils are equal, round, and reactive to light.   Cardiovascular:      Rate and Rhythm: Regular rhythm. Bradycardia present.      Pulses: Normal pulses.      Heart sounds: Normal heart sounds. No murmur heard.  Pulmonary:      Effort: Pulmonary effort is normal.      Breath sounds: Rhonchi present. No wheezing.   Abdominal:      General: Bowel sounds are normal. There is no distension.      Palpations: Abdomen is soft.      Tenderness: There is no abdominal tenderness. There is no guarding.   Musculoskeletal:         General: Swelling present.      Cervical back: Normal range of motion and neck supple.      Right lower leg: Edema present.      Left lower leg: Edema present.   Skin:     General: Skin is warm and dry.      Capillary Refill: Capillary refill takes 2 to 3 seconds.      Coloration: Skin is pale.      Findings: Bruising present. No erythema or rash.   Neurological:      Mental Status: She is alert and oriented to person, place, and time. Mental status is at baseline.   Psychiatric:         Mood and Affect: Mood normal.         Behavior: Behavior normal.         Thought Content: Thought content normal.         Judgment: Judgment normal.         CRANIAL NERVES     CN III, IV, VI   Pupils are equal, round, and reactive to light.     Significant Labs: All pertinent labs within the past 24 hours have been reviewed.    Significant Imaging: I have reviewed all pertinent imaging results/findings within the past 24 hours.    Assessment/Plan:     * Sepsis  This patient does have evidence of infective  "focus  My overall impression is sepsis. Vital signs were reviewed and noted in progress note.  Antibiotics given-   Antibiotics (From admission, onward)            Start     Stop Route Frequency Ordered    04/03/22 0245  piperacillin-tazobactam 4.5 g in dextrose 5 % 100 mL IVPB (ready to mix system)  (Sepsis Workup)         -- IV Every 8 hours (non-standard times) 04/02/22 2210 04/02/22 2130  mupirocin 2 % ointment         04/07 2059 Nasl 2 times daily 04/02/22 2018 04/02/22 2106  vancomycin - pharmacy to dose  (vancomycin IVPB)        "And" Linked Group Details    -- IV pharmacy to manage frequency 04/02/22 2006        Cultures were taken-   Microbiology Results (last 7 days)     Procedure Component Value Units Date/Time    Urine culture [[id number]] Collected: 04/02/22 2108    Order Status: No result Specimen: Urine Updated: 04/02/22 2153    Blood culture [[id number]] Collected: 04/02/22 2105    Order Status: Sent Specimen: Blood Updated: 04/02/22 2105    Blood culture [[id number]]     Order Status: Canceled Specimen: Blood     Blood culture x two cultures. Draw prior to antibiotics. [[id number]] Collected: 04/02/22 1854    Order Status: Sent Specimen: Blood from Peripheral, Wrist, Left Updated: 04/02/22 1855    Blood culture x two cultures. Draw prior to antibiotics. [[id number]] Collected: 04/02/22 1851    Order Status: Sent Specimen: Blood from Peripheral, Antecubital, Left Updated: 04/02/22 1852        Latest lactate reviewed, they are-  Recent Labs   Lab 04/02/22  1847   LACTATE 0.7       Concerned for sepsis 2/2 hypothermia, tachypnea, and hypotension while in the ED  Source- UTI and Allergic bronchopulmonary aspergillosis     Source control Achieved by- IV Vancomycin and IV Zosyn. She was also given IVFs in the ED.        UTI (urinary tract infection)  Cont empiric vancomycin and zosyn  Will follow urine cultures      Allergic bronchopulmonary aspergillosis  Chronic bronchitis, unspecified chronic " bronchitis type      Follows Dr. Kelvin Maki with pulmonary and Dr. Catia Amaya with ID outpatient. Was seen by Dr. Amaya on 3/29 in clinic and was started on Vfend at that time. Patient stated she began this drug on today and her symptoms started soon afterwards.  Per chart review from IDs notes:  An 81-year-old woman with CAD, HTN, ischemic cardiomyopathy, HFrEF-45-50%, diastolic HF, s/p biventricular ICD, DM2 with polyneuropathy, COPD, and severe persistent asthma who was referred for management of allergic bronchopulmonary aspergillosis (ABPA). Mrs. Quinones has experienced chronic progressive shortness of breath with wheezing and congestion. She feels as though she has mucous production but is unable to expectorate. She has completed a short course of steroids without improvement as well as antibiotics such as doxycyline and Augmentin. She was evaluated by pulmonologist Dr. Maki with work up revealing elevated serum IgE levels, as well as mucous plugging on CT of the chest. Sputum cultures on 3/9/22 are positive for Aspergillus species.     --Will consult Pulmonary and ID while inpatient  --Will hold Vfend until evaluated by ID and pulmonary--cont to watch electrolytes   --She was started on IV Vancomycin and Zosyn empirically by the ED--will cont for now  --Follow blood cultures   --Procal pending  --CXR--Chronic coarse interstitial prominence, similar to prior.  No large focal consolidation or significant detrimental change  --Cont duo nebs and Spiriva home meds    Hypothermia  Warming blanket ordered  Temp range 94.3>97.7      Hyponatremia  Holding Vfend  Na chronically low but today noted at 117  Patient is AAOx3  Fluid restriction ordered  Cautious with ordering Na tabs 2/2 heart failure--patient is mentally intact  Will request ICU level care for monitoring overnight  Will recheck BMP    Urinary retention  Follows Urology here at [facility]  De La Cruz catheter placed in clinic on yesterday  Bladder scan ordered  prn  Will need strict I's and O's      Current mild episode of major depressive disorder without prior episode  Stable.  Calm on exam.      Obstructive sleep apnea  Chronic.       Acute on chronic combined systolic and diastolic heart failure  Follows FAUSTINA Underwood in the cardiology clinic  Last TTE from 2/2022:  · The left ventricle is normal in size with mildly decreased systolic function.  · The estimated ejection fraction is 45-50%. There is left ventricular global hypokinesis.  · Grade II left ventricular diastolic dysfunction.  · Mild mitral regurgitation.  · Normal right ventricular size with normal right ventricular systolic function.  · Mild tricuspid regurgitation.  · The estimated PA systolic pressure is 46 mmHg.  · Normal central venous pressure (3 mmHg).  · There is pulmonary hypertension.  · Severe left atrial enlargement.    SBP range while in the ED in the 90's  Holding home meds for now overnight  Consulted cardiology          Type 2 diabetes mellitus with hyperglycemia, with long-term current use of insulin  Hemoglobin A1c 5.9 from 2/22/2022  POCT 4 times daily  Low dose SSI prn  Diabetic/ cardiac diet ordered       Moderate asthma  Cont duo nebs and Spiriva       Hyperlipidemia associated with type 2 diabetes mellitus  Cont pravastatin 40 mg PO daily       CKD (chronic kidney disease) stage 4, GFR 15-29 ml/min  Cr 2.6 which is at baseline  De La Cruz catheter was placed in the urology clinic for urinary retention  Family reports low urinary output  Strict I's and O's  Daily weights ordered  Bladder scan ordered prn  Nephrology consulted  Avoid nephrotoxic agents  Renally dose meds      Cardiomyopathy, ischemic  Chronic problem.  TTE from 2/22 reviewed.    Chronic obstructive pulmonary disease with acute exacerbation  Chronic problem  Will cont duo nebs  Not on home O2 per the family  Cont Spiriva daily      Iron deficiency anemia  Hemoglobin 8.5  Hemoglobin 8.7-9.1 on baseline  Follow     Hypertension  associated with diabetes  Hold pressure were very soft on admission  Hold home meds for now      Biventricular ICD (implantable cardioverter-defibrillator) in place  Chronic. No reported issues with this device.        VTE Risk Mitigation (From admission, onward)         Ordered     heparin (porcine) injection 5,000 Units  Every 8 hours         04/02/22 1953     IP VTE HIGH RISK PATIENT  Once         04/02/22 1953     Place sequential compression device  Until discontinued         04/02/22 1953              Critical care time spent on the evaluation and treatment of severe organ dysfunction, review of pertinent labs and imaging studies, discussions with consulting providers and discussions with patient/family: 90 minutes.     Brayan Frye NP  Department of Hospital Medicine   [place] - Intensive Care

## 2022-04-03 NOTE — ASSESSMENT & PLAN NOTE
Chronic bronchitis, unspecified chronic bronchitis type      Follows Dr. Kelvin Maki with pulmonary and Dr. Catia Amaya with ID outpatient. Was seen by Dr. Amaya on 3/29 in clinic and was started on Vfend at that time. Patient stated she began this drug on today and her symptoms started soon afterwards.  Per chart review from IDs notes:  An 81-year-old woman with CAD, HTN, ischemic cardiomyopathy, HFrEF-45-50%, diastolic HF, s/p biventricular ICD, DM2 with polyneuropathy, COPD, and severe persistent asthma who was referred for management of allergic bronchopulmonary aspergillosis (ABPA). Mrs. Quinones has experienced chronic progressive shortness of breath with wheezing and congestion. She feels as though she has mucous production but is unable to expectorate. She has completed a short course of steroids without improvement as well as antibiotics such as doxycyline and Augmentin. She was evaluated by pulmonologist Dr. Maki with work up revealing elevated serum IgE levels, as well as mucous plugging on CT of the chest. Sputum cultures on 3/9/22 are positive for Aspergillus species.     --Will consult Pulmonary and ID while inpatient  --Will hold Vfend 2/2 hyperkalemia, hyponatremia, and elevated uric acid levels  --She was started on IV Vancomycin and Zosyn empirically  --Follow blood cultures   --Procal pending  --CXR--Chronic coarse interstitial prominence, similar to prior.  No large focal consolidation or significant detrimental change  --Cont duo nebs and Spiriva home meds

## 2022-04-03 NOTE — SUBJECTIVE & OBJECTIVE
Past Medical History:   Diagnosis Date    Acute hypoxemic respiratory failure 12/19/2019    Acute right-sided thoracic back pain 12/09/2019    Allergy     Asthma     Basal cell carcinoma     left forehead    Basal cell carcinoma     left nose    Basal cell carcinoma 05/20/2015    right nose    Basal cell carcinoma 12/22/2015    left lower post neck    Basal cell carcinoma 12/03/2019    left ant scalp     Breast cancer     CAD (coronary artery disease)     Cardiomyopathy     Cardiomyopathy, ischemic     Cataract     CHF (congestive heart failure)     Chronic kidney disease, stage 3, mod decreased GFR 02/14/2017    Colon polyp 2011    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, with long-term current use of insulin 02/22/2018    COPD (chronic obstructive pulmonary disease)     COPD exacerbation 04/08/2018    Current mild episode of major depressive disorder without prior episode 01/25/2022    Defibrillator discharge     Diabetes mellitus     Diabetes mellitus type II     Diabetes with neurologic complications     Goiter     MNG    HX: breast cancer     Hyperlipidemia     Hypertension     Iron deficiency anemia 05/16/2017    Left kidney mass     Meningioma     Microalbuminuria due to type 2 diabetes mellitus 01/26/2022    Osteoporosis, postmenopausal     Pneumonia 12/08/2019    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    Proteinuria 1/21/2019    Pseudomonas pneumonia     Skin cancer     s/p excision    Sleep apnea     CPAP    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/2015    mid forehead    Unspecified vitamin D deficiency     Ventricular tachycardia     Vitamin B12 deficiency     Vitamin D deficiency disease        Past Surgical History:   Procedure Laterality Date    BASAL CELL CARCINOMA EXCISION      posterior neck and nose    BREAST BIOPSY      BREAST CYST EXCISION Left     BREAST SURGERY      CARDIAC DEFIBRILLATOR PLACEMENT      x 2    CATARACT  EXTRACTION W/  INTRAOCULAR LENS IMPLANT Bilateral     CHOLECYSTECTOMY      COLONOSCOPY N/A 11/5/2019    Procedure: COLONOSCOPY;  Surgeon: Boaz Botello MD;  Location: [facility] ([address] [place] [address]);  Service: Endoscopy;  Laterality: N/A;  AICD - [employer]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    FRACTURE SURGERY      left elbow and wrist as a child    HYSTERECTOMY      MASTECTOMY Right     REPLACEMENT OF IMPLANTABLE CARDIOVERTER-DEFIBRILLATOR (ICD) GENERATOR N/A 12/17/2018    Procedure: REPLACEMENT, ICD GENERATOR;  Surgeon: Jan Mckeon MD;  Location: [facility];  Service: Cardiology;  Laterality: N/A;  DONNA, CRT-D gen change, MDT, MAC, SK, 3 Prep    REVISION OF SKIN POCKET FOR CARDIOVERTER-DEFIBRILLATOR  12/17/2018    Procedure: Revision, Skin Pocket, For Cardioverter-Defibrillator;  Surgeon: Jan Mckeon MD;  Location: [facility];  Service: Cardiology;;    SQUAMOUS CELL CARCINOMA EXCISION      remved from forehead    TONSILLECTOMY         Review of patient's allergies indicates:   Allergen Reactions    Iodine and iodide containing products Hives    Nifedipine      weakness       No current facility-administered medications on file prior to encounter.     Current Outpatient Medications on File Prior to Encounter   Medication Sig    albuterol (PROVENTIL/VENTOLIN HFA) 90 mcg/actuation inhaler INHALE 2 PUFFS INTO THE LUNGS EVERY 6 (SIX) HOURS AS NEEDED FOR WHEEZING. RESCUE    albuterol-ipratropium (DUO-NEB) 2.5 mg-0.5 mg/3 mL nebulizer solution TAKE 3 MLS BY NEBULIZATION EVERY 4 (FOUR) HOURS AS NEEDED FOR WHEEZING.    ascorbic acid, vitamin C, (VITAMIN C) 100 MG tablet Take 100 mg by mouth once daily.    benzonatate (TESSALON) 100 MG capsule Take 1 capsule (100 mg total) by mouth 3 (three) times daily as needed for Cough.    blood sugar diagnostic (ACCU-CHEK ANGELICA) Strp Uses Accu-Check Angelica meter to test BG 4x/day    carvediloL (COREG) 25 MG tablet TAKE 2 TABLETS (50 MG TOTAL) BY MOUTH 2 (TWO) TIMES DAILY.     "cholecalciferol, vitamin D3, (VITAMIN D3) 50 mcg (2,000 unit) Tab Take 1 tablet by mouth once daily.     cloNIDine 0.1 mg/24 hr td ptwk (CATAPRES) 0.1 mg/24 hr Place 1 patch onto the skin every Tuesday.    CYANOCOBALAMIN, VITAMIN B-12, (B-12 DOTS ORAL) Take 1 tablet by mouth once daily.    diltiaZEM (CARDIZEM CD) 240 MG 24 hr capsule Take 1 capsule (240 mg total) by mouth once daily.    FLUAD QUAD 2021-22,65Y UP,,PF, 60 mcg (15 mcg x 4)/0.5 mL Syrg     fluticasone propionate (FLONASE) 50 mcg/actuation nasal spray 2 sprays (100 mcg total) by Each Nostril route once daily.    fluticasone-salmeterol diskus inhaler 250-50 mcg Inhale 1 puff into the lungs 2 (two) times daily. Controller    furosemide (LASIX) 20 MG tablet Take 1 tablet (20 mg total) by mouth once daily.    hydrALAZINE (APRESOLINE) 100 MG tablet Take 0.5 tablets (50 mg total) by mouth 2 (two) times daily.    insulin (LANTUS SOLOSTAR U-100 INSULIN) glargine 100 units/mL (3mL) SubQ pen Inject into the skin.    irbesartan (AVAPRO) 300 MG tablet Take 1 tablet (300 mg total) by mouth every evening.    lancets (ACCU-CHEK SOFTCLIX LANCETS) Misc Uses Accu-Chek Angelica meter to test BG 1x/day    levocetirizine (XYZAL) 5 MG tablet Take 5 mg by mouth every evening.    magnesium oxide (MAG-OX) 400 mg tablet Take 1 tablet (400 mg total) by mouth once daily.    montelukast (SINGULAIR) 10 mg tablet Take 1 tablet (10 mg total) by mouth every evening.    mucus clearing device (ACAPELLA, FLUTTER) by Misc.(Non-Drug; Combo Route) route 4 (four) times daily as needed (to relieve mucous).    nitroGLYCERIN (NITROSTAT) 0.4 MG SL tablet Place 0.4 mg under the tongue every 5 (five) minutes as needed for Chest pain.     omega-3 fatty acids 500 mg Cap Take 1 capsule by mouth Twice daily.    pen needle, diabetic (BD ULTRA-FINE MINI PEN NEEDLE) 31 gauge x 3/16" Ndle USE WITH LANTUS AT BEDTIME    polyethylene glycol (GLYCOLAX) 17 gram PwPk Take 17 g by mouth daily as needed " (constipation).    pravastatin (PRAVACHOL) 40 MG tablet Take 1 tablet (40 mg total) by mouth once daily.    pulse oximeter (PULSE OXIMETER) device by Apply Externally route 2 (two) times a day. Use twice daily at 8 AM and 3 PM and record the value in MyChart as directed.    sodium chloride 3% 3 % nebulizer solution TAKE 4 MLS BY NEBULIZATION 4 (FOUR) TIMES DAILY AS NEEDED FOR OTHER OR COUGH (TO INDUCE SPUTUM AND CLEAR MUCOUS.).    tiotropium (SPIRIVA) 18 mcg inhalation capsule Inhale 1 capsule (18 mcg total) into the lungs once daily. Controller    voriconazole (VFEND) 200 MG Tab Take two tablets (400 mg) twice per day the first day then take one tablet (200 mg) twice per day starting the second day     Family History       Problem Relation (Age of Onset)    Asthma Mother    Cancer Brother, Son, Daughter    Diabetes Father, Sister, Brother, Brother, Brother, Brother, Son, Daughter, Son    Heart disease Father, Sister, Brother, Brother, Brother    Hypertension Brother, Brother, Mother    Melanoma Daughter    No Known Problems Maternal Grandmother, Maternal Grandfather, Paternal Grandmother, Paternal Grandfather    Obesity Daughter    Stroke Mother          Tobacco Use    Smoking status: Never Smoker    Smokeless tobacco: Never Used   Substance and Sexual Activity    Alcohol use: No     Alcohol/week: 0.0 standard drinks    Drug use: No    Sexual activity: Yes     Partners: Male     Review of Systems   Constitutional:  Positive for activity change, appetite change and fatigue. Negative for chills, diaphoresis and fever.   HENT:  Positive for hearing loss. Negative for congestion and trouble swallowing.    Eyes:  Negative for photophobia and visual disturbance.   Respiratory:  Positive for cough and shortness of breath.    Cardiovascular:  Positive for leg swelling. Negative for chest pain and palpitations.   Gastrointestinal:  Negative for abdominal distention, abdominal pain, anal bleeding, blood in stool,  constipation, diarrhea, nausea and vomiting.   Genitourinary:  Positive for difficulty urinating. Negative for hematuria.        Noted urinary retention--lauren placed by urologist on yesterday   Musculoskeletal:  Positive for gait problem. Negative for arthralgias and myalgias.   Skin:  Negative for rash and wound.   Allergic/Immunologic: Positive for immunocompromised state.   Neurological:  Positive for weakness. Negative for facial asymmetry, light-headedness, numbness and headaches.   Hematological:  Does not bruise/bleed easily.   Psychiatric/Behavioral:  Negative for agitation and confusion. The patient is not nervous/anxious.    Objective:     Vital Signs (Most Recent):  Temp: (!) 94.3 °F (34.6 °C) (04/02/22 2232)  Pulse: (!) 50 (04/02/22 2050)  Resp: 20 (04/02/22 2050)  BP: (!) 136/59 (04/02/22 2050)  SpO2: 99 % (04/02/22 1945)   Vital Signs (24h Range):  Temp:  [94.3 °F (34.6 °C)-97.7 °F (36.5 °C)] 94.3 °F (34.6 °C)  Pulse:  [50] 50  Resp:  [18-44] 20  SpO2:  [96 %-99 %] 99 %  BP: ()/(49-59) 136/59     Weight: 64 kg (141 lb)  Body mass index is 24.98 kg/m².    Physical Exam  Vitals and nursing note reviewed.   Constitutional:       Appearance: She is obese. She is ill-appearing.   HENT:      Head: Normocephalic and atraumatic.      Nose: Nose normal.      Mouth/Throat:      Mouth: Mucous membranes are moist.   Eyes:      Extraocular Movements: Extraocular movements intact.      Conjunctiva/sclera: Conjunctivae normal.      Pupils: Pupils are equal, round, and reactive to light.   Cardiovascular:      Rate and Rhythm: Regular rhythm. Bradycardia present.      Pulses: Normal pulses.      Heart sounds: Normal heart sounds. No murmur heard.  Pulmonary:      Effort: Pulmonary effort is normal.      Breath sounds: Rhonchi present. No wheezing.   Abdominal:      General: Bowel sounds are normal. There is no distension.      Palpations: Abdomen is soft.      Tenderness: There is no abdominal tenderness. There  is no guarding.   Musculoskeletal:         General: Swelling present.      Cervical back: Normal range of motion and neck supple.      Right lower leg: Edema present.      Left lower leg: Edema present.   Skin:     General: Skin is warm and dry.      Capillary Refill: Capillary refill takes 2 to 3 seconds.      Coloration: Skin is pale.      Findings: Bruising present. No erythema or rash.   Neurological:      Mental Status: She is alert and oriented to person, place, and time. Mental status is at baseline.   Psychiatric:         Mood and Affect: Mood normal.         Behavior: Behavior normal.         Thought Content: Thought content normal.         Judgment: Judgment normal.         CRANIAL NERVES     CN III, IV, VI   Pupils are equal, round, and reactive to light.     Significant Labs: All pertinent labs within the past 24 hours have been reviewed.    Significant Imaging: I have reviewed all pertinent imaging results/findings within the past 24 hours.

## 2022-04-03 NOTE — CONSULTS
[facility] - Intensive Care  Infectious Disease  Consult Note    Patient Name: Myesha Quinones  MRN: [id number]  Admission Date: 4/2/2022  Hospital Length of Stay: 1 days  Attending Physician: Danish Reyes, *  Primary Care Provider: Catrachita Rothman MD     Isolation Status: No active isolations    Patient information was obtained from patient, relative(s) and past medical records.      Inpatient consult to Infectious Diseases  Consult performed by: Jovon Smart MD  Consult ordered by: Brayan Frye NP  Reason for consult: ABPA        Assessment/Plan:     UTI (urinary tract infection)  -would stop vanco  -continue zosyn while awaiting cx    ABPA (allergic bronchopulmonary aspergillosis)  82 yo F with a PMH notable for acute bronchopulmonary aspergillosis, KHOA, combined HF, DM, asthma, COPD, HTN, and ischimic CM who presented to the ED on 4/2/22 due to generalized weakness of one day's duration.     -unlikely that one dose of voriconazole caused her admission  -after her hyponatremia is addressed would restart voriconazole with steroids and monitor            Thank you for your consult. I will follow-up with patient. Please contact us if you have any additional questions.    Jovon Smart MD  Infectious Disease  [facility] - Intensive Care    Subjective:     Principal Problem: Sepsis    HPI: 82 yo F with a PMH notable for acute bronchopulmonary aspergillosis, KHOA, combined HF, DM, asthma, COPD, HTN, and ischimic CM who presented to the ED on 4/2/22 due to generalized weakness of one day's duration. The patient states that had taken her first dose of voriconazole for her ABPA earlier on the day of presentation and began to experience generalized weakness with paresthesias of her fingers. On arrival to the ED, labs were notable for a sodium of 117 from a baseline of approximately 130. She was then admitted to the ICU for closer monitoring in symptomatic hyponatremia. Urine cx with GNRs. Has been on  steroids in the past but not currently taking at home. Was not given itraconazole as first line ABPA therapy due to CHF. On vanco and zosyn currently      Past Medical History:   Diagnosis Date    Acute hypoxemic respiratory failure 12/19/2019    Acute right-sided thoracic back pain 12/09/2019    Allergy     Asthma     Basal cell carcinoma     left forehead    Basal cell carcinoma     left nose    Basal cell carcinoma 05/20/2015    right nose    Basal cell carcinoma 12/22/2015    left lower post neck    Basal cell carcinoma 12/03/2019    left ant scalp     Breast cancer     CAD (coronary artery disease)     Cardiomyopathy     Cardiomyopathy, ischemic     Cataract     CHF (congestive heart failure)     Chronic kidney disease, stage 3, mod decreased GFR 02/14/2017    Colon polyp 2011    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, with long-term current use of insulin 02/22/2018    COPD (chronic obstructive pulmonary disease)     COPD exacerbation 04/08/2018    Current mild episode of major depressive disorder without prior episode 01/25/2022    Defibrillator discharge     Diabetes mellitus     Diabetes mellitus type II     Diabetes with neurologic complications     Goiter     MNG    HX: breast cancer     Hyperlipidemia     Hypertension     Iron deficiency anemia 05/16/2017    Left kidney mass     Meningioma     Microalbuminuria due to type 2 diabetes mellitus 01/26/2022    Osteoporosis, postmenopausal     Pneumonia 12/08/2019    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    Proteinuria 1/21/2019    Pseudomonas pneumonia     Skin cancer     s/p excision    Sleep apnea     CPAP    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/2015    mid forehead    Unspecified vitamin D deficiency     UTI (urinary tract infection) 4/2/2022    Ventricular tachycardia     Vitamin B12 deficiency     Vitamin D deficiency disease         Past Surgical History:   Procedure Laterality Date    BASAL CELL CARCINOMA EXCISION      posterior neck and nose    BREAST BIOPSY      BREAST CYST EXCISION Left     BREAST SURGERY      CARDIAC DEFIBRILLATOR PLACEMENT      x 2    CATARACT EXTRACTION W/  INTRAOCULAR LENS IMPLANT Bilateral     CHOLECYSTECTOMY      COLONOSCOPY N/A 11/5/2019    Procedure: COLONOSCOPY;  Surgeon: Boaz Botello MD;  Location: [facility] END[facility] ([address] [place] [address]);  Service: Endoscopy;  Laterality: N/A;  AICD - [employer] - sm    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    FRACTURE SURGERY      left elbow and wrist as a child    HYSTERECTOMY      MASTECTOMY Right     REPLACEMENT OF IMPLANTABLE CARDIOVERTER-DEFIBRILLATOR (ICD) GENERATOR N/A 12/17/2018    Procedure: REPLACEMENT, ICD GENERATOR;  Surgeon: Jan Mckeon MD;  Location: [facility] EP LAB;  Service: Cardiology;  Laterality: N/A;  DONNA, CRT-D gen change, MDT, MAC, SK, 3 Prep    REVISION OF SKIN POCKET FOR CARDIOVERTER-DEFIBRILLATOR  12/17/2018    Procedure: Revision, Skin Pocket, For Cardioverter-Defibrillator;  Surgeon: Jan Mckeon MD;  Location: [facility] EP LAB;  Service: Cardiology;;    SQUAMOUS CELL CARCINOMA EXCISION      remved from forehead    TONSILLECTOMY         Review of patient's allergies indicates:   Allergen Reactions    Iodine and iodide containing products Hives    Nifedipine      weakness       Medications:  Medications Prior to Admission   Medication Sig    albuterol (PROVENTIL/VENTOLIN HFA) 90 mcg/actuation inhaler INHALE 2 PUFFS INTO THE LUNGS EVERY 6 (SIX) HOURS AS NEEDED FOR WHEEZING. RESCUE    albuterol-ipratropium (DUO-NEB) 2.5 mg-0.5 mg/3 mL nebulizer solution TAKE 3 MLS BY NEBULIZATION EVERY 4 (FOUR) HOURS AS NEEDED FOR WHEEZING.    ascorbic acid, vitamin C, (VITAMIN C) 100 MG tablet Take 100 mg by mouth once daily.    benzonatate (TESSALON) 100 MG capsule Take 1 capsule (100 mg total) by mouth 3 (three) times daily as needed for Cough.    blood sugar  diagnostic (ACCU-CHEK HECTOR) Strp Uses Accu-Check Hector meter to test BG 4x/day    carvediloL (COREG) 25 MG tablet TAKE 2 TABLETS (50 MG TOTAL) BY MOUTH 2 (TWO) TIMES DAILY.    cholecalciferol, vitamin D3, (VITAMIN D3) 50 mcg (2,000 unit) Tab Take 1 tablet by mouth once daily.     cloNIDine 0.1 mg/24 hr td ptwk (CATAPRES) 0.1 mg/24 hr Place 1 patch onto the skin every Tuesday.    CYANOCOBALAMIN, VITAMIN B-12, (B-12 DOTS ORAL) Take 1 tablet by mouth once daily.    diltiaZEM (CARDIZEM CD) 240 MG 24 hr capsule Take 1 capsule (240 mg total) by mouth once daily.    FLUAD QUAD 2021-22,65Y UP,,PF, 60 mcg (15 mcg x 4)/0.5 mL Syrg     fluticasone propionate (FLONASE) 50 mcg/actuation nasal spray 2 sprays (100 mcg total) by Each Nostril route once daily.    fluticasone-salmeterol diskus inhaler 250-50 mcg Inhale 1 puff into the lungs 2 (two) times daily. Controller    furosemide (LASIX) 20 MG tablet Take 1 tablet (20 mg total) by mouth once daily.    hydrALAZINE (APRESOLINE) 100 MG tablet Take 0.5 tablets (50 mg total) by mouth 2 (two) times daily.    insulin (LANTUS SOLOSTAR U-100 INSULIN) glargine 100 units/mL (3mL) SubQ pen Inject into the skin.    irbesartan (AVAPRO) 300 MG tablet Take 1 tablet (300 mg total) by mouth every evening.    lancets (ACCU-CHEK SOFTCLIX LANCETS) Misc Uses Accu-Chek Hector meter to test BG 1x/day    levocetirizine (XYZAL) 5 MG tablet Take 5 mg by mouth every evening.    magnesium oxide (MAG-OX) 400 mg tablet Take 1 tablet (400 mg total) by mouth once daily.    montelukast (SINGULAIR) 10 mg tablet Take 1 tablet (10 mg total) by mouth every evening.    mucus clearing device (ACAPELLA, FLUTTER) by Misc.(Non-Drug; Combo Route) route 4 (four) times daily as needed (to relieve mucous).    nitroGLYCERIN (NITROSTAT) 0.4 MG SL tablet Place 0.4 mg under the tongue every 5 (five) minutes as needed for Chest pain.     omega-3 fatty acids 500 mg Cap Take 1 capsule by mouth Twice daily.     "pen needle, diabetic (BD ULTRA-FINE MINI PEN NEEDLE) 31 gauge x 3/16" Ndle USE WITH LANTUS AT BEDTIME    polyethylene glycol (GLYCOLAX) 17 gram PwPk Take 17 g by mouth daily as needed (constipation).    pravastatin (PRAVACHOL) 40 MG tablet Take 1 tablet (40 mg total) by mouth once daily.    pulse oximeter (PULSE OXIMETER) device by Apply Externally route 2 (two) times a day. Use twice daily at 8 AM and 3 PM and record the value in MyChart as directed.    sodium chloride 3% 3 % nebulizer solution TAKE 4 MLS BY NEBULIZATION 4 (FOUR) TIMES DAILY AS NEEDED FOR OTHER OR COUGH (TO INDUCE SPUTUM AND CLEAR MUCOUS.).    tiotropium (SPIRIVA) 18 mcg inhalation capsule Inhale 1 capsule (18 mcg total) into the lungs once daily. Controller    voriconazole (VFEND) 200 MG Tab Take two tablets (400 mg) twice per day the first day then take one tablet (200 mg) twice per day starting the second day     Antibiotics (From admission, onward)                Start     Stop Route Frequency Ordered    04/03/22 0245  piperacillin-tazobactam 4.5 g in dextrose 5 % 100 mL IVPB (ready to mix system)  (Sepsis Workup)         -- IV Every 8 hours (non-standard times) 04/02/22 2210    04/02/22 2130  mupirocin 2 % ointment         04/07 2059 Nasl 2 times daily 04/02/22 2018 04/02/22 2106  vancomycin - pharmacy to dose  (vancomycin IVPB)        "And" Linked Group Details    -- IV pharmacy to manage frequency 04/02/22 2006          Antifungals (From admission, onward)                None          Antivirals (From admission, onward)      None             Immunization History   Administered Date(s) Administered    COVID-19, MRNA, LN-S, PF (Pfizer) (Purple Cap) 03/10/2021, 04/06/2021, 11/12/2021    Influenza 10/15/2007, 10/10/2008, 09/29/2009    Influenza (FLUAD) - Quadrivalent - Adjuvanted - PF *Preferred* (65+) 11/23/2021    Influenza - High Dose - PF (65 years and older) 10/21/2010, 10/11/2011, 10/08/2013, 09/25/2014, 10/07/2015, " 11/09/2016, 11/16/2017, 11/30/2018, 09/17/2019    Influenza - Quadrivalent - High Dose - PF (65 years and older) 11/05/2020    Influenza Split 10/15/2007, 10/10/2008, 09/29/2009    Pneumococcal Conjugate - 13 Valent 07/23/2015    Pneumococcal Polysaccharide - 23 Valent 06/05/2013    Td (ADULT) 04/02/2009    Tdap 07/22/2014    Zoster 04/02/2009       Family History       Problem Relation (Age of Onset)    Asthma Mother    Cancer Brother, Son, Daughter    Diabetes Father, Sister, Brother, Brother, Brother, Brother, Son, Daughter, Son    Heart disease Father, Sister, Brother, Brother, Brother    Hypertension Brother, Brother, Mother    Melanoma Daughter    No Known Problems Maternal Grandmother, Maternal Grandfather, Paternal Grandmother, Paternal Grandfather    Obesity Daughter    Stroke Mother          Social History     Socioeconomic History    Marital status:    Occupational History    Occupation: Homemaker     Employer: OTHER   Tobacco Use    Smoking status: Never Smoker    Smokeless tobacco: Never Used   Substance and Sexual Activity    Alcohol use: No     Alcohol/week: 0.0 standard drinks    Drug use: No    Sexual activity: Yes     Partners: Male   Other Topics Concern    Are you pregnant or think you may be? No    Breast-feeding No     Review of Systems   Constitutional:  Positive for activity change, appetite change and fatigue. Negative for chills, diaphoresis and fever.   HENT:  Positive for hearing loss. Negative for congestion and trouble swallowing.    Eyes:  Negative for photophobia and visual disturbance.   Respiratory:  Positive for cough and shortness of breath.    Cardiovascular:  Positive for leg swelling. Negative for chest pain and palpitations.   Gastrointestinal:  Negative for abdominal distention, abdominal pain, anal bleeding, blood in stool, constipation, diarrhea, nausea and vomiting.   Genitourinary:  Positive for difficulty urinating. Negative for hematuria.         Noted urinary retention--lauren placed by urologist on yesterday   Musculoskeletal:  Positive for gait problem. Negative for arthralgias and myalgias.   Skin:  Negative for rash and wound.   Allergic/Immunologic: Positive for immunocompromised state.   Neurological:  Positive for weakness. Negative for facial asymmetry, light-headedness, numbness and headaches.   Hematological:  Does not bruise/bleed easily.   Psychiatric/Behavioral:  Negative for agitation and confusion. The patient is not nervous/anxious.    Objective:     Vital Signs (Most Recent):  Temp: 98 °F (36.7 °C) (04/03/22 1130)  Pulse: 94 (04/03/22 1132)  Resp: (!) 21 (04/03/22 1132)  BP: (!) 198/78 (04/03/22 1100)  SpO2: (!) 77 % (04/03/22 1132)   Vital Signs (24h Range):  Temp:  [94 °F (34.4 °C)-98 °F (36.7 °C)] 98 °F (36.7 °C)  Pulse:  [50-94] 94  Resp:  [18-44] 21  SpO2:  [77 %-99 %] 77 %  BP: ()/(49-79) 198/78     Weight: 65 kg (143 lb 4.8 oz)  Body mass index is 25.38 kg/m².    Estimated Creatinine Clearance: 15.1 mL/min (A) (based on SCr of 2.6 mg/dL (H)).    Physical Exam  Vitals and nursing note reviewed.   Constitutional:       Appearance: She is obese. She is ill-appearing.   HENT:      Head: Normocephalic and atraumatic.      Nose: Nose normal.      Mouth/Throat:      Mouth: Mucous membranes are moist.   Eyes:      Extraocular Movements: Extraocular movements intact.      Conjunctiva/sclera: Conjunctivae normal.      Pupils: Pupils are equal, round, and reactive to light.   Cardiovascular:      Rate and Rhythm: Regular rhythm. Bradycardia present.      Pulses: Normal pulses.      Heart sounds: Normal heart sounds. No murmur heard.  Pulmonary:      Effort: Pulmonary effort is normal.      Breath sounds: Rhonchi present. No wheezing.   Abdominal:      General: Bowel sounds are normal. There is no distension.      Palpations: Abdomen is soft.      Tenderness: There is no abdominal tenderness. There is no guarding.   Musculoskeletal:          General: Swelling present.      Cervical back: Normal range of motion and neck supple.      Right lower leg: Edema present.      Left lower leg: Edema present.   Skin:     General: Skin is warm and dry.      Capillary Refill: Capillary refill takes 2 to 3 seconds.      Coloration: Skin is pale.      Findings: Bruising present. No erythema or rash.   Neurological:      Mental Status: She is alert and oriented to person, place, and time. Mental status is at baseline.   Psychiatric:         Mood and Affect: Mood normal.         Behavior: Behavior normal.         Thought Content: Thought content normal.         Judgment: Judgment normal.       Significant Labs: All pertinent labs within the past 24 hours have been reviewed.    Significant Imaging: I have reviewed all pertinent imaging results/findings within the past 24 hours.

## 2022-04-03 NOTE — CARE UPDATE
Spoke to Dr. Barahona with [facility] regarding this patient. MD states he will order SIADH workup and he states he will order slow NS infusion. MD states he would like to hold off on given Na tabs at this time. MD also reccommended holding off on furosemide at this time. Will cont to follow.    Brayan Frye, SHLEBI

## 2022-04-03 NOTE — HPI
82 yo F with a PMH notable for acute bronchopulmonary aspergillosis, KHOA, combined HF, DM, asthma, COPD, HTN, and ischimic CM who presented to the ED on 4/2/22 due to generalized weakness of one day's duration. The patient states that had taken her first dose of voriconazole for her ABPA earlier on the day of presentation and began to experience generalized weakness with paresthesias of her fingers. On arrival to the ED, labs were notable for a sodium of 117 from a baseline of approximately 130. She was then admitted to the ICU for closer monitoring in symptomatic hyponatremia. Urine cx with GNRs. Has been on steroids in the past but not currently taking at home. Was not given itraconazole as first line ABPA therapy due to CHF. On vanco and zosyn currently

## 2022-04-03 NOTE — SUBJECTIVE & OBJECTIVE
Interval History: reports tingling in hands and funny feeling in abdomen. Some shortness of breath. Overall does not appear as sick as would be expected based off labs.    Review of Systems   Constitutional:  Positive for appetite change and fatigue.   Respiratory:  Positive for cough and shortness of breath.    Cardiovascular:  Negative for chest pain.   Gastrointestinal:  Negative for abdominal pain and diarrhea.   Neurological:  Positive for weakness.   Objective:     Vital Signs (Most Recent):  Temp: 98 °F (36.7 °C) (04/03/22 1130)  Pulse: 90 (04/03/22 1530)  Resp: (!) 21 (04/03/22 1530)  BP: (!) 194/81 (04/03/22 1530)  SpO2: (!) 94 % (04/03/22 1530)   Vital Signs (24h Range):  Temp:  [94 °F (34.4 °C)-98 °F (36.7 °C)] 98 °F (36.7 °C)  Pulse:  [50-94] 90  Resp:  [18-44] 21  SpO2:  [77 %-99 %] 94 %  BP: ()/(49-81) 194/81     Weight: 65 kg (143 lb 4.8 oz)  Body mass index is 25.38 kg/m².    Intake/Output Summary (Last 24 hours) at 4/3/2022 1552  Last data filed at 4/3/2022 1421  Gross per 24 hour   Intake 1752.48 ml   Output 1830 ml   Net -77.52 ml      Physical Exam  Vitals and nursing note reviewed.   Constitutional:       Appearance: She is obese. She is ill-appearing.   HENT:      Head: Normocephalic and atraumatic.      Nose: Nose normal.      Mouth/Throat:      Mouth: Mucous membranes are moist.   Eyes:      Extraocular Movements: Extraocular movements intact.      Conjunctiva/sclera: Conjunctivae normal.      Pupils: Pupils are equal, round, and reactive to light.   Cardiovascular:      Rate and Rhythm: Regular rhythm. Bradycardia present.      Pulses: Normal pulses.      Heart sounds: Normal heart sounds. No murmur heard.  Pulmonary:      Effort: Pulmonary effort is normal.      Breath sounds: Rhonchi present. No wheezing.   Abdominal:      General: Bowel sounds are normal. There is no distension.      Palpations: Abdomen is soft.      Tenderness: There is no abdominal tenderness. There is no  guarding.   Musculoskeletal:         General: Swelling present.      Cervical back: Normal range of motion and neck supple.      Right lower leg: Edema present.      Left lower leg: Edema present.   Skin:     General: Skin is warm and dry.      Capillary Refill: Capillary refill takes 2 to 3 seconds.      Coloration: Skin is pale.      Findings: Bruising present. No erythema or rash.   Neurological:      Mental Status: She is alert and oriented to person, place, and time. Mental status is at baseline.   Psychiatric:         Mood and Affect: Mood normal.         Behavior: Behavior normal.         Thought Content: Thought content normal.         Judgment: Judgment normal.       Significant Labs: All pertinent labs within the past 24 hours have been reviewed.    Significant Imaging: I have reviewed all pertinent imaging results/findings within the past 24 hours.

## 2022-04-03 NOTE — CONSULTS
U Pulm/Crit Consultation     Admitting Team: [facility]  Attending Physician: Danish Reyes, *    Date of Admit: 4/2/2022    Chief Complaint   Weakness x <1 day    Subjective:      History of Present Illness:  Ms. Myesha Quinones is an 80 yo F with a PMH notable for acute bronchopulmonary aspergillosis, KHOA, combined HF, DM, asthma, COPD, HTN, and ischimic CM who presented to the ED on 4/2/22 due to generalized weakness of one day's duration. The patient states that had taken her first dose of voriconazole for her ABPA earlier the day of presentation and began to experience generalized weakness with paresthesias of her fingers. On arrival to the ED, labs were notable for a sodium of 117 from a baseline of approximately 130. She was then admitted to the ICU for closer monitoring in symptomatic hyponatremia.    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 Authorizing Provider   albuterol (PROVENTIL/VENTOLIN HFA) 90 mcg/actuation inhaler INHALE 2 PUFFS INTO THE LUNGS EVERY 6 (SIX) HOURS AS NEEDED FOR WHEEZING. RESCUE 10/18/21   Emelina Gaston MD   albuterol-ipratropium (DUO-NEB) 2.5 mg-0.5 mg/3 mL nebulizer solution TAKE 3 MLS BY NEBULIZATION EVERY 4 (FOUR) HOURS AS NEEDED FOR WHEEZING. 12/9/21   Emelina Gaston MD   ascorbic acid, vitamin C, (VITAMIN C) 100 MG tablet Take 100 mg by mouth once daily.    Historical Provider   benzonatate (TESSALON) 100 MG capsule Take 1 capsule (100 mg total) by mouth 3 (three) times daily as needed for Cough. 3/1/22   Jolynn Kemp PA-C    blood sugar diagnostic (ACCU-CHEK ANGELICA) Strp Uses Accu-Check Angelica meter to test BG 4x/day 12/15/20   Emelina Gaston MD   carvediloL (COREG) 25 MG tablet TAKE 2 TABLETS (50 MG TOTAL) BY MOUTH 2 (TWO) TIMES DAILY. 3/9/22   Li Lott PA-C   cholecalciferol, vitamin D3, (VITAMIN D3) 50 mcg (2,000 unit) Tab Take 1 tablet by mouth once daily.     Historical Provider   cloNIDine 0.1 mg/24 hr td ptwk (CATAPRES) 0.1 mg/24 hr Place 1 patch onto the skin every Tuesday. 4/13/21   Saran Bain MD   CYANOCOBALAMIN, VITAMIN B-12, (B-12 DOTS ORAL) Take 1 tablet by mouth once daily.    Historical Provider   diltiaZEM (CARDIZEM CD) 240 MG 24 hr capsule Take 1 capsule (240 mg total) by mouth once daily. 3/9/22 3/9/23  Li Lott PA-C   FLUAD QUAD 2021-22,65Y UP,,PF, 60 mcg (15 mcg x 4)/0.5 mL Syrg  11/23/21   Historical Provider   fluticasone propionate (FLONASE) 50 mcg/actuation nasal spray 2 sprays (100 mcg total) by Each Nostril route once daily. 2/24/21   Brittany Metcalf MD   fluticasone-salmeterol diskus inhaler 250-50 mcg Inhale 1 puff into the lungs 2 (two) times daily. Controller 5/13/21 5/13/22  Meaghan Zhu, MARILYN   furosemide (LASIX) 20 MG tablet Take 1 tablet (20 mg total) by mouth once daily. 3/21/22   Li Lott PA-C   hydrALAZINE (APRESOLINE) 100 MG tablet Take 0.5 tablets (50 mg total) by mouth 2 (two) times daily. 3/29/22   Li Lott PA-C   insulin (LANTUS SOLOSTAR U-100 INSULIN) glargine 100 units/mL (3mL) SubQ pen Inject into the skin.    Historical Provider   irbesartan (AVAPRO) 300 MG tablet Take 1 tablet (300 mg total) by mouth every evening. 6/21/21 6/21/22  Saran Bain MD   lancets (ACCU-CHEK SOFTCLIX LANCETS) Misc Uses Accu-Chek Angelica meter to test BG 1x/day 2/1/22   Elidia Madison NP   levocetirizine (XYZAL) 5 MG tablet Take 5 mg by mouth every evening.    Historical Provider   magnesium oxide (MAG-OX) 400 mg tablet Take 1 tablet (400 mg total) by mouth once  "daily. 12/8/17   Emelina Gaston MD   montelukast (SINGULAIR) 10 mg tablet Take 1 tablet (10 mg total) by mouth every evening. 1/25/22   Catrachita Rothman MD   mucus clearing device (ACAPELLA, FLUTTER) by Misc.(Non-Drug; Combo Route) route 4 (four) times daily as needed (to relieve mucous). 3/3/22   Kelvin Maki MD   nitroGLYCERIN (NITROSTAT) 0.4 MG SL tablet Place 0.4 mg under the tongue every 5 (five) minutes as needed for Chest pain.  5/29/12   Historical Provider   omega-3 fatty acids 500 mg Cap Take 1 capsule by mouth Twice daily. 5/29/12   Historical Provider   pen needle, diabetic (BD ULTRA-FINE MINI PEN NEEDLE) 31 gauge x 3/16" Ndle USE WITH LANTUS AT BEDTIME 12/14/20   Emelina Gaston MD   polyethylene glycol (GLYCOLAX) 17 gram PwPk Take 17 g by mouth daily as needed (constipation). 3/1/22   Jolynn Kemp PA-C   pravastatin (PRAVACHOL) 40 MG tablet Take 1 tablet (40 mg total) by mouth once daily. 6/2/21   Saran Bain MD   pulse oximeter (PULSE OXIMETER) device by Apply Externally route 2 (two) times a day. Use twice daily at 8 AM and 3 PM and record the value in MyChart as directed. 12/12/20   Rodrigo Schmidt MD   sodium chloride 3% 3 % nebulizer solution TAKE 4 MLS BY NEBULIZATION 4 (FOUR) TIMES DAILY AS NEEDED FOR OTHER OR COUGH (TO INDUCE SPUTUM AND CLEAR MUCOUS.). 3/7/22   Kelvin Maki MD   tiotropium (SPIRIVA) 18 mcg inhalation capsule Inhale 1 capsule (18 mcg total) into the lungs once daily. Controller 1/25/22 1/25/23  Catrachita Rothman MD   voriconazole (VFEND) 200 MG Tab Take two tablets (400 mg) twice per day the first day then take one tablet (200 mg) twice per day starting the second day 3/29/22   Catia Amaya MD       Family History:  Family History   Problem Relation Age of Onset    Diabetes Father     Heart disease Father     Diabetes Sister     Heart disease Sister     Diabetes Brother     Heart disease Brother     Hypertension Brother     Diabetes Brother  " "   Heart disease Brother     Hypertension Brother     Diabetes Brother     Heart disease Brother     Cancer Brother         colon    Diabetes Brother     Cancer Son         skin    Diabetes Son         prediabetes    Diabetes Daughter         prediabetes    Cancer Daughter         melanoma    Obesity Daughter     Melanoma Daughter     Diabetes Son     Asthma Mother     Hypertension Mother     Stroke Mother     No Known Problems Maternal Grandmother     No Known Problems Maternal Grandfather     No Known Problems Paternal Grandmother     No Known Problems Paternal Grandfather     Amblyopia Neg Hx     Blindness Neg Hx     Cataracts Neg Hx     Glaucoma Neg Hx     Macular degeneration Neg Hx     Retinal detachment Neg Hx     Strabismus Neg Hx     Thyroid disease Neg Hx        Social History:  Social History     Tobacco Use    Smoking status: Never Smoker    Smokeless tobacco: Never Used   Substance Use Topics    Alcohol use: No     Alcohol/week: 0.0 standard drinks    Drug use: No       Review of Systems:  Patient endorses mild pins and needles sensation of the bilateral hands as well as generalized weakness and some light-headedness. All other systems are reviewed and are negative.     Objective:   Last 24 Hour Vital Signs:  BP  Min: 99/49  Max: 198/78  Temp  Av.2 °F (35.7 °C)  Min: 94 °F (34.4 °C)  Max: 97.7 °F (36.5 °C)  Pulse  Av.3  Min: 50  Max: 84  Resp  Av.8  Min: 18  Max: 44  SpO2  Av.7 %  Min: 92 %  Max: 99 %  Height  Av' 3" (160 cm)  Min: 5' 3" (160 cm)  Max: 5' 3" (160 cm)  Weight  Av.7 kg (142 lb 8.5 oz)  Min: 64 kg (141 lb)  Max: 65 kg (143 lb 4.8 oz)  Body mass index is 25.38 kg/m².  I/O last 3 completed shifts:  In: 1631.2 [I.V.:194.7; IV Piggyback:1436.5]  Out: 330 [Urine:330]    Physical Examination:  Gen: WDWN female in NAD  Neuro: GCS 15, sensation strength intact throughout bilaterally, AAOx3. PERRL, EOMI.  CV: RRR, no murmurs, rubs, or " gallops  Pulm: CTAB, no rhonchi, rales, or wheezes. On room air.  GI: Non-distended, non-tender, soft abdomen    Skin: Warm, dry  Extrem: Trace peripheral edema noted in bilateral ankles    Laboratory:  Most Recent Data:  BMP:   Lab Results   Component Value Date     (L) 04/03/2022    K 5.0 04/03/2022    CL 93 (L) 04/03/2022    CO2 16 (L) 04/03/2022    BUN 69 (H) 04/03/2022    CREATININE 2.6 (H) 04/03/2022     (H) 04/03/2022    CALCIUM 8.1 (L) 04/03/2022    MG 2.0 04/03/2022    PHOS 4.9 (H) 04/03/2022     Urinalysis:   Lab Results   Component Value Date    LABURIN (A) 04/01/2022     GRAM NEGATIVE YA  > 100,000 cfu/ml  Identification and susceptibility pending  No other significant isolate      COLORU Yellow 04/02/2022    SPECGRAV 1.015 04/02/2022    NITRITE Negative 04/02/2022    KETONESU Negative 04/02/2022    UROBILINOGEN Negative 04/02/2022    WBCUA >100 (H) 04/02/2022      Assessment and Plan:   Neuro:  Patient currently AAOx3 with no perceivable neurologic deficits.    Paresthesias:  Patient noted to have tingling sensation in bilateral hands.  - Most likely attributable to her hyponatremia of 117  - Will monitor for improvement with improving sodium    CV:  Patient currently hypertensive and not requiring pressors. Hemodynamically stable.    Combined Systolic and Diastolic Heart Failure:  Most recent TTE in February of 2022 shows an LVEF of 45-50% as well as grade II diastolic dysfunction  - Associated with ischemic cardiomyopathy s/p biventricular ICD placement  - Patient is euvolemic on exam  - Home meds currently being held by primary team, awaiting cardiology consultation today    HTN:  Patient initially arrived mildly hypotensive, but has recently developed elevated BP pushing 180 systolic.  - Home Coreg restarted    Pulm:  Patient currently on room air with excellent SpO2 values.    ABPA:  Patient recently started on voriconazole for ABPA per ID as outpatient.  - Do not believe voriconazole  contributed to her hyponatremia  - Holding pending resolution of acute issues  - ID consult    Asthma:  Continue tiotropium, duonebs    GI:    Hyperlipidemia:  Continue home pravastatin    Renal:  Patient with a creatinine of 2.6 on arrival, from a baseline of 2.3 approximately a month ago    Hyponatremia:  Patient presenting with a sodium of 117 from a baseline of approximately 130  - Associated with mild tingling in hands as well as presenting complaint of weakness.  - Patient's BG not markedly elevated. Lipid panel ordered to rule out pseudo-hyponatremia.   - Patient appears euvolemic on exam  - Serum osm of 279, urine Na <20, urine osm pending  - Nephrology consulted  - Last Na 120 following administration of hypertonic saline  - Will repeat Serum osm    Chronic Kidney Disease Stage 4:  Creatinine of 2.6 from baseline of 2.3  - Dose medications appropriately  - Avoid nephrotoxic agents  - De La Cruz in place    Urinary Retention:  Patient currently with De La Cruz catheter in place    Heme/ID:    Sepsis:  Patient initially presented hypotensive and hypothermic concerning for infection  - Urine culture with GNR, speciation pending  - Currently on vanc and Zosyn  - Follow-up BCx and narrow abx as able    Iron Deficiency Anemia:  Hemoglobin of 8.5 on presentation from a baseline of 9-10  - Continue to monitor with daily CBC      Endocrine:    Diabetes Mellitus Type 2:  Last A1c of 5.9 in February of this year  - POC Glu checks AC and at night  - Low dose SSI  - Diabetic diet    Psych:  No acute issues    Prophylaxis:  On SQH  No GI Ppx needed    Code Status:     Full    Willis Cannon MD  [facility]

## 2022-04-03 NOTE — SUBJECTIVE & OBJECTIVE
Past Medical History:   Diagnosis Date    Acute hypoxemic respiratory failure 12/19/2019    Acute right-sided thoracic back pain 12/09/2019    Allergy     Asthma     Basal cell carcinoma     left forehead    Basal cell carcinoma     left nose    Basal cell carcinoma 05/20/2015    right nose    Basal cell carcinoma 12/22/2015    left lower post neck    Basal cell carcinoma 12/03/2019    left ant scalp     Breast cancer     CAD (coronary artery disease)     Cardiomyopathy     Cardiomyopathy, ischemic     Cataract     CHF (congestive heart failure)     Chronic kidney disease, stage 3, mod decreased GFR 02/14/2017    Colon polyp 2011    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, with long-term current use of insulin 02/22/2018    COPD (chronic obstructive pulmonary disease)     COPD exacerbation 04/08/2018    Current mild episode of major depressive disorder without prior episode 01/25/2022    Defibrillator discharge     Diabetes mellitus     Diabetes mellitus type II     Diabetes with neurologic complications     Goiter     MNG    HX: breast cancer     Hyperlipidemia     Hypertension     Iron deficiency anemia 05/16/2017    Left kidney mass     Meningioma     Microalbuminuria due to type 2 diabetes mellitus 01/26/2022    Osteoporosis, postmenopausal     Pneumonia 12/08/2019    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    Proteinuria 1/21/2019    Pseudomonas pneumonia     Skin cancer     s/p excision    Sleep apnea     CPAP    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/2015    mid forehead    Unspecified vitamin D deficiency     UTI (urinary tract infection) 4/2/2022    Ventricular tachycardia     Vitamin B12 deficiency     Vitamin D deficiency disease        Past Surgical History:   Procedure Laterality Date    BASAL CELL CARCINOMA EXCISION      posterior neck and nose    BREAST BIOPSY      BREAST CYST EXCISION Left     BREAST SURGERY      CARDIAC  DEFIBRILLATOR PLACEMENT      x 2    CATARACT EXTRACTION W/  INTRAOCULAR LENS IMPLANT Bilateral     CHOLECYSTECTOMY      COLONOSCOPY N/A 11/5/2019    Procedure: COLONOSCOPY;  Surgeon: Boaz Botello MD;  Location: [facility] ([address] [place] [address]);  Service: Endoscopy;  Laterality: N/A;  AICD - [employer]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    FRACTURE SURGERY      left elbow and wrist as a child    HYSTERECTOMY      MASTECTOMY Right     REPLACEMENT OF IMPLANTABLE CARDIOVERTER-DEFIBRILLATOR (ICD) GENERATOR N/A 12/17/2018    Procedure: REPLACEMENT, ICD GENERATOR;  Surgeon: Jan Mckeon MD;  Location: [facility] EP LAB;  Service: Cardiology;  Laterality: N/A;  DONNA, CRT-D gen change, MDT, MAC, SK, 3 Prep    REVISION OF SKIN POCKET FOR CARDIOVERTER-DEFIBRILLATOR  12/17/2018    Procedure: Revision, Skin Pocket, For Cardioverter-Defibrillator;  Surgeon: Jan Mckeon MD;  Location: [facility] EP LAB;  Service: Cardiology;;    SQUAMOUS CELL CARCINOMA EXCISION      remved from forehead    TONSILLECTOMY         Review of patient's allergies indicates:   Allergen Reactions    Iodine and iodide containing products Hives    Nifedipine      weakness       Medications:  Medications Prior to Admission   Medication Sig    albuterol (PROVENTIL/VENTOLIN HFA) 90 mcg/actuation inhaler INHALE 2 PUFFS INTO THE LUNGS EVERY 6 (SIX) HOURS AS NEEDED FOR WHEEZING. RESCUE    albuterol-ipratropium (DUO-NEB) 2.5 mg-0.5 mg/3 mL nebulizer solution TAKE 3 MLS BY NEBULIZATION EVERY 4 (FOUR) HOURS AS NEEDED FOR WHEEZING.    ascorbic acid, vitamin C, (VITAMIN C) 100 MG tablet Take 100 mg by mouth once daily.    benzonatate (TESSALON) 100 MG capsule Take 1 capsule (100 mg total) by mouth 3 (three) times daily as needed for Cough.    blood sugar diagnostic (ACCU-CHEK ANGELICA) Strp Uses Accu-Check Angelica meter to test BG 4x/day    carvediloL (COREG) 25 MG tablet TAKE 2 TABLETS (50 MG TOTAL) BY MOUTH 2 (TWO) TIMES DAILY.    cholecalciferol, vitamin D3, (VITAMIN D3) 50  "mcg (2,000 unit) Tab Take 1 tablet by mouth once daily.     cloNIDine 0.1 mg/24 hr td ptwk (CATAPRES) 0.1 mg/24 hr Place 1 patch onto the skin every Tuesday.    CYANOCOBALAMIN, VITAMIN B-12, (B-12 DOTS ORAL) Take 1 tablet by mouth once daily.    diltiaZEM (CARDIZEM CD) 240 MG 24 hr capsule Take 1 capsule (240 mg total) by mouth once daily.    FLUAD QUAD 2021-22,65Y UP,,PF, 60 mcg (15 mcg x 4)/0.5 mL Syrg     fluticasone propionate (FLONASE) 50 mcg/actuation nasal spray 2 sprays (100 mcg total) by Each Nostril route once daily.    fluticasone-salmeterol diskus inhaler 250-50 mcg Inhale 1 puff into the lungs 2 (two) times daily. Controller    furosemide (LASIX) 20 MG tablet Take 1 tablet (20 mg total) by mouth once daily.    hydrALAZINE (APRESOLINE) 100 MG tablet Take 0.5 tablets (50 mg total) by mouth 2 (two) times daily.    insulin (LANTUS SOLOSTAR U-100 INSULIN) glargine 100 units/mL (3mL) SubQ pen Inject into the skin.    irbesartan (AVAPRO) 300 MG tablet Take 1 tablet (300 mg total) by mouth every evening.    lancets (ACCU-CHEK SOFTCLIX LANCETS) Misc Uses Accu-Chek Angelica meter to test BG 1x/day    levocetirizine (XYZAL) 5 MG tablet Take 5 mg by mouth every evening.    magnesium oxide (MAG-OX) 400 mg tablet Take 1 tablet (400 mg total) by mouth once daily.    montelukast (SINGULAIR) 10 mg tablet Take 1 tablet (10 mg total) by mouth every evening.    mucus clearing device (ACAPELLA, FLUTTER) by Misc.(Non-Drug; Combo Route) route 4 (four) times daily as needed (to relieve mucous).    nitroGLYCERIN (NITROSTAT) 0.4 MG SL tablet Place 0.4 mg under the tongue every 5 (five) minutes as needed for Chest pain.     omega-3 fatty acids 500 mg Cap Take 1 capsule by mouth Twice daily.    pen needle, diabetic (BD ULTRA-FINE MINI PEN NEEDLE) 31 gauge x 3/16" Ndle USE WITH LANTUS AT BEDTIME    polyethylene glycol (GLYCOLAX) 17 gram PwPk Take 17 g by mouth daily as needed (constipation).    pravastatin (PRAVACHOL) 40 MG tablet " "Take 1 tablet (40 mg total) by mouth once daily.    pulse oximeter (PULSE OXIMETER) device by Apply Externally route 2 (two) times a day. Use twice daily at 8 AM and 3 PM and record the value in MyChart as directed.    sodium chloride 3% 3 % nebulizer solution TAKE 4 MLS BY NEBULIZATION 4 (FOUR) TIMES DAILY AS NEEDED FOR OTHER OR COUGH (TO INDUCE SPUTUM AND CLEAR MUCOUS.).    tiotropium (SPIRIVA) 18 mcg inhalation capsule Inhale 1 capsule (18 mcg total) into the lungs once daily. Controller    voriconazole (VFEND) 200 MG Tab Take two tablets (400 mg) twice per day the first day then take one tablet (200 mg) twice per day starting the second day     Antibiotics (From admission, onward)                Start     Stop Route Frequency Ordered    04/03/22 0245  piperacillin-tazobactam 4.5 g in dextrose 5 % 100 mL IVPB (ready to mix system)  (Sepsis Workup)         -- IV Every 8 hours (non-standard times) 04/02/22 2210    04/02/22 2130  mupirocin 2 % ointment         04/07 2059 Nasl 2 times daily 04/02/22 2018 04/02/22 2106  vancomycin - pharmacy to dose  (vancomycin IVPB)        "And" Linked Group Details    -- IV pharmacy to manage frequency 04/02/22 2006          Antifungals (From admission, onward)                None          Antivirals (From admission, onward)      None             Immunization History   Administered Date(s) Administered    COVID-19, MRNA, LN-S, PF (Pfizer) (Purple Cap) 03/10/2021, 04/06/2021, 11/12/2021    Influenza 10/15/2007, 10/10/2008, 09/29/2009    Influenza (FLUAD) - Quadrivalent - Adjuvanted - PF *Preferred* (65+) 11/23/2021    Influenza - High Dose - PF (65 years and older) 10/21/2010, 10/11/2011, 10/08/2013, 09/25/2014, 10/07/2015, 11/09/2016, 11/16/2017, 11/30/2018, 09/17/2019    Influenza - Quadrivalent - High Dose - PF (65 years and older) 11/05/2020    Influenza Split 10/15/2007, 10/10/2008, 09/29/2009    Pneumococcal Conjugate - 13 Valent 07/23/2015    Pneumococcal Polysaccharide - " 23 Valent 06/05/2013    Td (ADULT) 04/02/2009    Tdap 07/22/2014    Zoster 04/02/2009       Family History       Problem Relation (Age of Onset)    Asthma Mother    Cancer Brother, Son, Daughter    Diabetes Father, Sister, Brother, Brother, Brother, Brother, Son, Daughter, Son    Heart disease Father, Sister, Brother, Brother, Brother    Hypertension Brother, Brother, Mother    Melanoma Daughter    No Known Problems Maternal Grandmother, Maternal Grandfather, Paternal Grandmother, Paternal Grandfather    Obesity Daughter    Stroke Mother          Social History     Socioeconomic History    Marital status:    Occupational History    Occupation: Homemaker     Employer: OTHER   Tobacco Use    Smoking status: Never Smoker    Smokeless tobacco: Never Used   Substance and Sexual Activity    Alcohol use: No     Alcohol/week: 0.0 standard drinks    Drug use: No    Sexual activity: Yes     Partners: Male   Other Topics Concern    Are you pregnant or think you may be? No    Breast-feeding No     Review of Systems   Constitutional:  Positive for activity change, appetite change and fatigue. Negative for chills, diaphoresis and fever.   HENT:  Positive for hearing loss. Negative for congestion and trouble swallowing.    Eyes:  Negative for photophobia and visual disturbance.   Respiratory:  Positive for cough and shortness of breath.    Cardiovascular:  Positive for leg swelling. Negative for chest pain and palpitations.   Gastrointestinal:  Negative for abdominal distention, abdominal pain, anal bleeding, blood in stool, constipation, diarrhea, nausea and vomiting.   Genitourinary:  Positive for difficulty urinating. Negative for hematuria.        Noted urinary retention--lauren placed by urologist on yesterday   Musculoskeletal:  Positive for gait problem. Negative for arthralgias and myalgias.   Skin:  Negative for rash and wound.   Allergic/Immunologic: Positive for immunocompromised state.   Neurological:  Positive  for weakness. Negative for facial asymmetry, light-headedness, numbness and headaches.   Hematological:  Does not bruise/bleed easily.   Psychiatric/Behavioral:  Negative for agitation and confusion. The patient is not nervous/anxious.    Objective:     Vital Signs (Most Recent):  Temp: 98 °F (36.7 °C) (04/03/22 1130)  Pulse: 94 (04/03/22 1132)  Resp: (!) 21 (04/03/22 1132)  BP: (!) 198/78 (04/03/22 1100)  SpO2: (!) 77 % (04/03/22 1132)   Vital Signs (24h Range):  Temp:  [94 °F (34.4 °C)-98 °F (36.7 °C)] 98 °F (36.7 °C)  Pulse:  [50-94] 94  Resp:  [18-44] 21  SpO2:  [77 %-99 %] 77 %  BP: ()/(49-79) 198/78     Weight: 65 kg (143 lb 4.8 oz)  Body mass index is 25.38 kg/m².    Estimated Creatinine Clearance: 15.1 mL/min (A) (based on SCr of 2.6 mg/dL (H)).    Physical Exam  Vitals and nursing note reviewed.   Constitutional:       Appearance: She is obese. She is ill-appearing.   HENT:      Head: Normocephalic and atraumatic.      Nose: Nose normal.      Mouth/Throat:      Mouth: Mucous membranes are moist.   Eyes:      Extraocular Movements: Extraocular movements intact.      Conjunctiva/sclera: Conjunctivae normal.      Pupils: Pupils are equal, round, and reactive to light.   Cardiovascular:      Rate and Rhythm: Regular rhythm. Bradycardia present.      Pulses: Normal pulses.      Heart sounds: Normal heart sounds. No murmur heard.  Pulmonary:      Effort: Pulmonary effort is normal.      Breath sounds: Rhonchi present. No wheezing.   Abdominal:      General: Bowel sounds are normal. There is no distension.      Palpations: Abdomen is soft.      Tenderness: There is no abdominal tenderness. There is no guarding.   Musculoskeletal:         General: Swelling present.      Cervical back: Normal range of motion and neck supple.      Right lower leg: Edema present.      Left lower leg: Edema present.   Skin:     General: Skin is warm and dry.      Capillary Refill: Capillary refill takes 2 to 3 seconds.       Coloration: Skin is pale.      Findings: Bruising present. No erythema or rash.   Neurological:      Mental Status: She is alert and oriented to person, place, and time. Mental status is at baseline.   Psychiatric:         Mood and Affect: Mood normal.         Behavior: Behavior normal.         Thought Content: Thought content normal.         Judgment: Judgment normal.       Significant Labs: All pertinent labs within the past 24 hours have been reviewed.    Significant Imaging: I have reviewed all pertinent imaging results/findings within the past 24 hours.

## 2022-04-03 NOTE — PROGRESS NOTES
Pharmacokinetic Initial Assessment: IV Vancomycin    Assessment/Plan:    Initiate intravenous vancomycin with loading dose of 1250 mg once with subsequent doses when random concentrations are less than 15 mcg/mL  Desired empiric serum trough concentration is 10 to 15 mcg/mL  Draw vancomycin random level on 4-3-22 at 1900.  Pharmacy will continue to follow and monitor vancomycin.      Please contact pharmacy at extension [phone] with any questions regarding this assessment.     Thank you for the consult,   Ruperto Vásquez       Patient brief summary:  Myesha Quinones is a 81 y.o. female initiated on antimicrobial therapy with IV Vancomycin for treatment of suspected  Opportunistic Infection Prophylaxis    Drug Allergies:   Review of patient's allergies indicates:   Allergen Reactions    Iodine and iodide containing products Hives    Nifedipine      weakness       Actual Body Weight:   64 kg    Renal Function:   Estimated Creatinine Clearance: 15 mL/min (A) (based on SCr of 2.6 mg/dL (H)).,     Dialysis Method (if applicable):  N/A    CBC (last 72 hours):  Recent Labs   Lab Result Units 04/02/22  1849   WBC K/uL 9.83   Hemoglobin g/dL 8.5*   Hematocrit % 27.2*   Platelets K/uL 237   Gran % % 70.5   Lymph % % 14.9*   Mono % % 7.1   Eosinophil % % 3.0   Basophil % % 0.4   Differential Method  Automated       Metabolic Panel (last 72 hours):  Recent Labs   Lab Result Units 04/02/22  1849   Sodium mmol/L 117*   Potassium mmol/L 6.0*   Chloride mmol/L 91*   CO2 mmol/L 16*   Glucose mg/dL 168*   BUN mg/dL 72*   Creatinine mg/dL 2.6*   Albumin g/dL 2.9*   Total Bilirubin mg/dL 0.3   Alkaline Phosphatase U/L 121   AST U/L 26   ALT U/L 27   Magnesium mg/dL 2.2       Drug levels (last 3 results):  No results for input(s): VANCOMYCINRA, VANCOMYCINPE, VANCOMYCINTR in the last 72 hours.    Microbiologic Results:  Microbiology Results (last 7 days)       Procedure Component Value Units Date/Time    Blood culture [[id number]]      Order Status: Sent Specimen: Blood     Blood culture [[id number]]     Order Status: Sent Specimen: Blood     Blood culture x two cultures. Draw prior to antibiotics. [[id number]] Collected: 04/02/22 1854    Order Status: Sent Specimen: Blood from Peripheral, Wrist, Left Updated: 04/02/22 1855    Blood culture x two cultures. Draw prior to antibiotics. [[id number]] Collected: 04/02/22 1851    Order Status: Sent Specimen: Blood from Peripheral, Antecubital, Left Updated: 04/02/22 1852

## 2022-04-03 NOTE — ASSESSMENT & PLAN NOTE
Holding Vfend  Na chronically low but today noted at 117  Patient is AAOx3  Fluid restriction ordered  Cautious with ordering Na tabs 2/2 heart failure--patient is mentally intact  Will request ICU level care for monitoring overnight  Will recheck BMP

## 2022-04-03 NOTE — ASSESSMENT & PLAN NOTE
Holding Vfend  Na chronically low but today noted at 117  Patient is AAOx3  Fluid restriction ordered  ICU level care for monitoring overnight  - serum osm wnl, suggestive of pseudohyponatremia but unclear what alternative cause would be (normal protein, glucose, and TG; no report of surgical irrigant used during recent lauren replacement)  - repeat test to confirm  - Nephrology consulted, added bicarb

## 2022-04-03 NOTE — ASSESSMENT & PLAN NOTE
Cr 2.6 which is at baseline  De La Cruz catheter was placed in the urology clinic for urinary retention  Family reports low urinary output  Strict I's and O's  Daily weights ordered  Bladder scan ordered prn  Nephrology consulted  Avoid nephrotoxic agents  Renally dose meds

## 2022-04-03 NOTE — ED NOTES
Attempt x 2 to obtain secondary IV site were unsuccessful. Primary nurse notified. Patient tolerated well.

## 2022-04-03 NOTE — PROGRESS NOTES
[facility]           Pharmacy        Current Drug Shortage     Due to national backorder and [facility] is critically low on inventory of Dextrose 50% (D50) Syringes and Vials, pharmacy has automatically switched from D50% to D10% IVPB at the equivalent dose until resolution of the shortage per P&T approved protocol.               Ruperto Vásquez, PharmD  [phone]

## 2022-04-03 NOTE — ANESTHESIA PROCEDURE NOTES
Peripheral IV Insertion    Diagnosis: I87.9 Disorder of vein, unspecified.    Patient location during procedure: ED  Procedure start time: 4/2/2022 9:45 PM  Timeout: 4/2/2022 9:45 PM  Procedure end time: 4/2/2022 10:15 PM    Staffing  Authorizing Provider: Alexx Varela MD  Performing Provider: Alexx Varela MD    Anesthesiologist was present at the time of the procedure.    Preanesthetic Checklist  Completed: patient identified, IV checked, site marked, risks and benefits discussed, surgical consent, monitors and equipment checked, pre-op evaluation, timeout performed and anesthesia consent givenPeripheral IV Insertion  Skin Prep: chlorhexidine gluconate  Local Infiltration: none  Orientation: left  Location: brachial  Catheter Type: peripheral IV (single lumen)  Catheter Size: 20 G  Catheter placement by Ultrasound guidance. Heme positive aspiration all ports.   Vessel Caliber: medium, patent, compressibility normal  Vascular Doppler:  not done  Needle advanced into vessel with real time Ultrasound guidance.Insertion Attempts: 2  Assessment  Dressing: secured with tape and tegaderm  Patient: Tolerated well  Line flushed easily.

## 2022-04-03 NOTE — ASSESSMENT & PLAN NOTE
Chronic problem  Will cont duo nebs  Not on home O2 per the family  Cont Spiriva daily at discharge, holding while on nebs

## 2022-04-03 NOTE — ASSESSMENT & PLAN NOTE
80 yo F with a PMH notable for acute bronchopulmonary aspergillosis, KHOA, combined HF, DM, asthma, COPD, HTN, and ischimic CM who presented to the ED on 4/2/22 due to generalized weakness of one day's duration.     -unlikely that one dose of voriconazole caused her admission  -after her hyponatremia is addressed would restart voriconazole with steroids and monitor

## 2022-04-03 NOTE — ASSESSMENT & PLAN NOTE
This patient does have evidence of infective focus  My overall impression is sepsis. Vital signs were reviewed and noted in progress note.  Antibiotics given-   Antibiotics (From admission, onward)            Start     Stop Route Frequency Ordered    04/03/22 0245  piperacillin-tazobactam 4.5 g in dextrose 5 % 100 mL IVPB (ready to mix system)  (Sepsis Workup)         -- IV Every 8 hours (non-standard times) 04/02/22 2210 04/02/22 2130  mupirocin 2 % ointment         04/07 2059 Nasl 2 times daily 04/02/22 2018        Cultures were taken-   Microbiology Results (last 7 days)     Procedure Component Value Units Date/Time    Blood culture [[id number]] Collected: 04/02/22 2105    Order Status: Completed Specimen: Blood Updated: 04/03/22 0715     Blood Culture, Routine No Growth to date    Blood culture x two cultures. Draw prior to antibiotics. [[id number]] Collected: 04/02/22 1854    Order Status: Completed Specimen: Blood from Peripheral, Wrist, Left Updated: 04/03/22 0715     Blood Culture, Routine No Growth to date    Narrative:      Aerobic and anaerobic    Blood culture x two cultures. Draw prior to antibiotics. [[id number]] Collected: 04/02/22 1851    Order Status: Completed Specimen: Blood from Peripheral, Antecubital, Left Updated: 04/03/22 0715     Blood Culture, Routine No Growth to date    Narrative:      Aerobic and anaerobic    Urine culture [[id number]] Collected: 04/02/22 2108    Order Status: No result Specimen: Urine Updated: 04/02/22 2153    Blood culture [[id number]]     Order Status: Canceled Specimen: Blood         Latest lactate reviewed, they are-  Recent Labs   Lab 04/02/22  1847   LACTATE 0.7       Concerned for sepsis 2/2 hypothermia, tachypnea, and hypotension while in the ED  Source- pyelo     Source control Achieved by- IV Vancomycin and IV Zosyn. She was also given IVFs in the ED.  - de-escalate to zosyn for now, likely further de-escalation tomorrow pending culture

## 2022-04-03 NOTE — ASSESSMENT & PLAN NOTE
Follows Urology here at [place]  De La Cruz catheter placed in clinic on day prior to admit  Bladder scan ordered prn  Will need strict I's and O's

## 2022-04-03 NOTE — ASSESSMENT & PLAN NOTE
"This patient does have evidence of infective focus  My overall impression is sepsis. Vital signs were reviewed and noted in progress note.  Antibiotics given-   Antibiotics (From admission, onward)            Start     Stop Route Frequency Ordered    04/03/22 0245  piperacillin-tazobactam 4.5 g in dextrose 5 % 100 mL IVPB (ready to mix system)  (Sepsis Workup)         -- IV Every 8 hours (non-standard times) 04/02/22 2210 04/02/22 2130  mupirocin 2 % ointment         04/07 2059 Nasl 2 times daily 04/02/22 2018 04/02/22 2106  vancomycin - pharmacy to dose  (vancomycin IVPB)        "And" Linked Group Details    -- IV pharmacy to manage frequency 04/02/22 2006        Cultures were taken-   Microbiology Results (last 7 days)     Procedure Component Value Units Date/Time    Urine culture [[id number]] Collected: 04/02/22 2108    Order Status: No result Specimen: Urine Updated: 04/02/22 2153    Blood culture [[id number]] Collected: 04/02/22 2105    Order Status: Sent Specimen: Blood Updated: 04/02/22 2105    Blood culture [[id number]]     Order Status: Canceled Specimen: Blood     Blood culture x two cultures. Draw prior to antibiotics. [[id number]] Collected: 04/02/22 1854    Order Status: Sent Specimen: Blood from Peripheral, Wrist, Left Updated: 04/02/22 1855    Blood culture x two cultures. Draw prior to antibiotics. [[id number]] Collected: 04/02/22 1851    Order Status: Sent Specimen: Blood from Peripheral, Antecubital, Left Updated: 04/02/22 1852        Latest lactate reviewed, they are-  Recent Labs   Lab 04/02/22  1847   LACTATE 0.7       Concerned for sepsis 2/2 hypothermia, tachypnea, and hypotension while in the ED  Source- UTI and Allergic bronchopulmonary aspergillosis     Source control Achieved by- IV Vancomycin and IV Zosyn. She was also given IVFs in the ED.      "

## 2022-04-03 NOTE — ASSESSMENT & PLAN NOTE
Hemoglobin A1c 5.9 from 2/22/2022  POCT 4 times daily  Low dose SSI prn  Diabetic/ cardiac diet ordered

## 2022-04-03 NOTE — ASSESSMENT & PLAN NOTE
Follows Urology here at [place]  De La Cruz catheter placed in clinic on yesterday  Bladder scan ordered prn  Will need strict I's and O's

## 2022-04-03 NOTE — HPI
Ms. Myesha Quinones is a 80 y/o F with a pmh of ischemic cardiomyopathy with pacemaker placement, CHF (EF 45%), diabetes, ABPA, COPD, and asthma. Her PCP is Dr. Catrachita Rothman. She denies illicit drug use, alcohol consumption, and cigarette smoking.     The patient presented to the ED here at [facility] with complaints of generalized weakness.  The patient's daughter states she started Voriconazole this morning for Aspergillus infection and pretty soon afterwards started to feel really weak.  Her fatigue is new since this morning after consuming the Voriconazole per the patient's family who is at the bedside. The family states the fatigue is constant, progressive, and associated with difficulty with ambulation since this am.  The patient typically lives alone and cares for herself per the family.  The patient also reports mild shortness of breath--she is not on home O2.  She denies chest pain, headache, fever, abdominal pain, nausea, vomiting, hematuria, diarrhea, constipation, and black/bloody stools.  She was seen by Urology on yesterday and had a new catheter placed for urinary retention.  They obtained a dipstick and sent for culture but did not start the patient on any antibiotics because they wanted to see the sensitivities per chart review.      Her ED workup includes: hemoglobin 8.5, Na--117, K 6.0>6.1, CO2 16, BUN--72, Cr--2.6, calcium 8.3, albumin, uric acid 7.3, BNP--302, troponin 0.010, lactate 0.7. U/A positive for UTI. Blood cultures and urine cultures are pending. She was given IV Vancomycin and Zosyn in the ED. Duo nebs were given in the ED. She was also given NS bolus in the ED. Lokelma was ordered to be given in the ED. Procal pending. CXR-- Chronic coarse interstitial prominence, similar to prior.  No large focal consolidation or significant detrimental change. We have consulted Nephrology, pulmonary, and ID while this patient is inpatient. This patient will be admitted to the ICU for [facility]  service under the care of Brayan Frye NP under the collaboration of Dr. Caroline Anderson.

## 2022-04-03 NOTE — ASSESSMENT & PLAN NOTE
Follows FAUSTINA Underwood in the cardiology clinic  Last TTE from 2/2022:  · The left ventricle is normal in size with mildly decreased systolic function.  · The estimated ejection fraction is 45-50%. There is left ventricular global hypokinesis.  · Grade II left ventricular diastolic dysfunction.  · Mild mitral regurgitation.  · Normal right ventricular size with normal right ventricular systolic function.  · Mild tricuspid regurgitation.  · The estimated PA systolic pressure is 46 mmHg.  · Normal central venous pressure (3 mmHg).  · There is pulmonary hypertension.  · Severe left atrial enlargement.    SBP range while in the ED in the 90's  Holding home meds for now overnight  Consulted cardiology

## 2022-04-03 NOTE — ED NOTES
MD aware of patients current vitals. Patient has had 3 episodes of soft stool while in ER. 2 warm blankets provided to help with temperature.

## 2022-04-03 NOTE — PLAN OF CARE
Pt is AAOx4. No complaints of nausea, vomiting, or diarrhea. Complained of back pain and tylenol given. On admit K 6 and shifted and improving. Hyponatremic and 3% saline gtt infusing. De La Cruz cathter placed in urology clinic with minimal urine output. Safety precautions maintained. Tolerated all medications well.

## 2022-04-03 NOTE — ASSESSMENT & PLAN NOTE
- soft BP on admission but now uptrending  - resume home coreg, losartan, hydralazine, and lasix; can consider further additions if remains elevated

## 2022-04-04 ENCOUNTER — PATIENT MESSAGE (OUTPATIENT)
Dept: UROLOGY | Facility: CLINIC | Age: 82
End: 2022-04-04
Payer: MEDICARE

## 2022-04-04 PROBLEM — T68.XXXA HYPOTHERMIA: Status: RESOLVED | Noted: 2022-04-02 | Resolved: 2022-04-04

## 2022-04-04 LAB
ALBUMIN SERPL BCP-MCNC: 2.6 G/DL (ref 3.5–5.2)
ALP SERPL-CCNC: 119 U/L (ref 55–135)
ALT SERPL W/O P-5'-P-CCNC: 32 U/L (ref 10–44)
ANION GAP SERPL CALC-SCNC: 10 MMOL/L (ref 8–16)
AST SERPL-CCNC: 27 U/L (ref 10–40)
BACTERIA UR CULT: ABNORMAL
BASOPHILS # BLD AUTO: 0.08 K/UL (ref 0–0.2)
BASOPHILS # BLD AUTO: 0.09 K/UL (ref 0–0.2)
BASOPHILS NFR BLD: 0.8 % (ref 0–1.9)
BASOPHILS NFR BLD: 0.8 % (ref 0–1.9)
BILIRUB DIRECT SERPL-MCNC: 0.2 MG/DL (ref 0.1–0.3)
BILIRUB SERPL-MCNC: 0.4 MG/DL (ref 0.1–1)
BILIRUB SERPL-MCNC: ABNORMAL MG/DL
BLOOD URINE, POC: ABNORMAL
BUN SERPL-MCNC: 59 MG/DL (ref 8–23)
CALCIUM SERPL-MCNC: 8.7 MG/DL (ref 8.7–10.5)
CHLORIDE SERPL-SCNC: 99 MMOL/L (ref 95–110)
CLARITY, POC UA: ABNORMAL
CO2 SERPL-SCNC: 20 MMOL/L (ref 23–29)
COLOR, POC UA: YELLOW
CREAT SERPL-MCNC: 2.5 MG/DL (ref 0.5–1.4)
DIFFERENTIAL METHOD: ABNORMAL
DIFFERENTIAL METHOD: ABNORMAL
EOSINOPHIL # BLD AUTO: 0 K/UL (ref 0–0.5)
EOSINOPHIL # BLD AUTO: 0 K/UL (ref 0–0.5)
EOSINOPHIL NFR BLD: 0 % (ref 0–8)
EOSINOPHIL NFR BLD: 0.1 % (ref 0–8)
ERYTHROCYTE [DISTWIDTH] IN BLOOD BY AUTOMATED COUNT: 13.4 % (ref 11.5–14.5)
ERYTHROCYTE [DISTWIDTH] IN BLOOD BY AUTOMATED COUNT: 13.4 % (ref 11.5–14.5)
EST. GFR  (AFRICAN AMERICAN): 20 ML/MIN/1.73 M^2
EST. GFR  (NON AFRICAN AMERICAN): 17 ML/MIN/1.73 M^2
GLUCOSE SERPL-MCNC: 120 MG/DL (ref 70–110)
GLUCOSE UR QL STRIP: ABNORMAL
HCT VFR BLD AUTO: 26.6 % (ref 37–48.5)
HCT VFR BLD AUTO: 26.8 % (ref 37–48.5)
HGB BLD-MCNC: 8.9 G/DL (ref 12–16)
HGB BLD-MCNC: 9 G/DL (ref 12–16)
IMM GRANULOCYTES # BLD AUTO: 0.21 K/UL (ref 0–0.04)
IMM GRANULOCYTES # BLD AUTO: 0.21 K/UL (ref 0–0.04)
IMM GRANULOCYTES NFR BLD AUTO: 1.9 % (ref 0–0.5)
IMM GRANULOCYTES NFR BLD AUTO: 2 % (ref 0–0.5)
KETONES UR QL STRIP: ABNORMAL
LEUKOCYTE ESTERASE URINE, POC: ABNORMAL
LYMPHOCYTES # BLD AUTO: 1.1 K/UL (ref 1–4.8)
LYMPHOCYTES # BLD AUTO: 1.2 K/UL (ref 1–4.8)
LYMPHOCYTES NFR BLD: 11.4 % (ref 18–48)
LYMPHOCYTES NFR BLD: 9.8 % (ref 18–48)
MAGNESIUM SERPL-MCNC: 2 MG/DL (ref 1.6–2.6)
MCH RBC QN AUTO: 29.5 PG (ref 27–31)
MCH RBC QN AUTO: 29.6 PG (ref 27–31)
MCHC RBC AUTO-ENTMCNC: 33.5 G/DL (ref 32–36)
MCHC RBC AUTO-ENTMCNC: 33.6 G/DL (ref 32–36)
MCV RBC AUTO: 88 FL (ref 82–98)
MCV RBC AUTO: 88 FL (ref 82–98)
MONOCYTES # BLD AUTO: 0.8 K/UL (ref 0.3–1)
MONOCYTES # BLD AUTO: 0.8 K/UL (ref 0.3–1)
MONOCYTES NFR BLD: 7.1 % (ref 4–15)
MONOCYTES NFR BLD: 7.2 % (ref 4–15)
NEUTROPHILS # BLD AUTO: 8.3 K/UL (ref 1.8–7.7)
NEUTROPHILS # BLD AUTO: 9 K/UL (ref 1.8–7.7)
NEUTROPHILS NFR BLD: 78.7 % (ref 38–73)
NEUTROPHILS NFR BLD: 80.2 % (ref 38–73)
NITRITE, POC UA: ABNORMAL
NRBC BLD-RTO: 0 /100 WBC
NRBC BLD-RTO: 0 /100 WBC
PATH REV BLD -IMP: NORMAL
PH, POC UA: 6
PHOSPHATE SERPL-MCNC: 5 MG/DL (ref 2.7–4.5)
PLATELET # BLD AUTO: 253 K/UL (ref 150–450)
PLATELET # BLD AUTO: 254 K/UL (ref 150–450)
PMV BLD AUTO: 9.5 FL (ref 9.2–12.9)
PMV BLD AUTO: 9.6 FL (ref 9.2–12.9)
POC RESIDUAL URINE VOLUME: 664 ML (ref 0–100)
POCT GLUCOSE: 143 MG/DL (ref 70–110)
POCT GLUCOSE: 193 MG/DL (ref 70–110)
POCT GLUCOSE: 232 MG/DL (ref 70–110)
POCT GLUCOSE: 260 MG/DL (ref 70–110)
POTASSIUM SERPL-SCNC: 4.5 MMOL/L (ref 3.5–5.1)
PROT SERPL-MCNC: 6.3 G/DL (ref 6–8.4)
PROTEIN, POC: ABNORMAL
RBC # BLD AUTO: 3.02 M/UL (ref 4–5.4)
RBC # BLD AUTO: 3.04 M/UL (ref 4–5.4)
SODIUM SERPL-SCNC: 129 MMOL/L (ref 136–145)
SPECIFIC GRAVITY, POC UA: 1.02
UROBILINOGEN, POC UA: ABNORMAL
WBC # BLD AUTO: 10.53 K/UL (ref 3.9–12.7)
WBC # BLD AUTO: 11.27 K/UL (ref 3.9–12.7)

## 2022-04-04 PROCEDURE — 97162 PT EVAL MOD COMPLEX 30 MIN: CPT

## 2022-04-04 PROCEDURE — 99232 SBSQ HOSP IP/OBS MODERATE 35: CPT | Mod: GC,,, | Performed by: INTERNAL MEDICINE

## 2022-04-04 PROCEDURE — 84100 ASSAY OF PHOSPHORUS: CPT | Performed by: HOSPITALIST

## 2022-04-04 PROCEDURE — 85060 PATHOLOGIST REVIEW: ICD-10-PCS | Mod: ,,, | Performed by: PATHOLOGY

## 2022-04-04 PROCEDURE — 25000003 PHARM REV CODE 250: Performed by: STUDENT IN AN ORGANIZED HEALTH CARE EDUCATION/TRAINING PROGRAM

## 2022-04-04 PROCEDURE — 80048 BASIC METABOLIC PNL TOTAL CA: CPT | Performed by: HOSPITALIST

## 2022-04-04 PROCEDURE — 25000242 PHARM REV CODE 250 ALT 637 W/ HCPCS: Performed by: STUDENT IN AN ORGANIZED HEALTH CARE EDUCATION/TRAINING PROGRAM

## 2022-04-04 PROCEDURE — 85060 BLOOD SMEAR INTERPRETATION: CPT | Mod: ,,, | Performed by: PATHOLOGY

## 2022-04-04 PROCEDURE — 27000221 HC OXYGEN, UP TO 24 HOURS

## 2022-04-04 PROCEDURE — 36415 COLL VENOUS BLD VENIPUNCTURE: CPT | Performed by: HOSPITALIST

## 2022-04-04 PROCEDURE — A4216 STERILE WATER/SALINE, 10 ML: HCPCS | Performed by: NURSE PRACTITIONER

## 2022-04-04 PROCEDURE — 25000003 PHARM REV CODE 250: Performed by: NURSE PRACTITIONER

## 2022-04-04 PROCEDURE — 97530 THERAPEUTIC ACTIVITIES: CPT

## 2022-04-04 PROCEDURE — 25000003 PHARM REV CODE 250: Performed by: HOSPITALIST

## 2022-04-04 PROCEDURE — 94640 AIRWAY INHALATION TREATMENT: CPT

## 2022-04-04 PROCEDURE — 99232 PR SUBSEQUENT HOSPITAL CARE,LEVL II: ICD-10-PCS | Mod: GC,,, | Performed by: INTERNAL MEDICINE

## 2022-04-04 PROCEDURE — 94660 CPAP INITIATION&MGMT: CPT

## 2022-04-04 PROCEDURE — 94761 N-INVAS EAR/PLS OXIMETRY MLT: CPT

## 2022-04-04 PROCEDURE — 97165 OT EVAL LOW COMPLEX 30 MIN: CPT

## 2022-04-04 PROCEDURE — 99900035 HC TECH TIME PER 15 MIN (STAT)

## 2022-04-04 PROCEDURE — 83735 ASSAY OF MAGNESIUM: CPT | Performed by: HOSPITALIST

## 2022-04-04 PROCEDURE — 11000001 HC ACUTE MED/SURG PRIVATE ROOM

## 2022-04-04 PROCEDURE — 63600175 PHARM REV CODE 636 W HCPCS: Performed by: HOSPITALIST

## 2022-04-04 PROCEDURE — 85025 COMPLETE CBC W/AUTO DIFF WBC: CPT | Performed by: HOSPITALIST

## 2022-04-04 PROCEDURE — 85025 COMPLETE CBC W/AUTO DIFF WBC: CPT | Mod: 91 | Performed by: HOSPITALIST

## 2022-04-04 PROCEDURE — 63600175 PHARM REV CODE 636 W HCPCS: Performed by: NURSE PRACTITIONER

## 2022-04-04 PROCEDURE — 80076 HEPATIC FUNCTION PANEL: CPT | Performed by: HOSPITALIST

## 2022-04-04 RX ORDER — ALBUTEROL SULFATE 2.5 MG/.5ML
2.5 SOLUTION RESPIRATORY (INHALATION)
Status: DISCONTINUED | OUTPATIENT
Start: 2022-04-04 | End: 2022-04-06 | Stop reason: HOSPADM

## 2022-04-04 RX ORDER — IPRATROPIUM BROMIDE AND ALBUTEROL SULFATE 2.5; .5 MG/3ML; MG/3ML
3 SOLUTION RESPIRATORY (INHALATION) EVERY 4 HOURS PRN
Status: DISCONTINUED | OUTPATIENT
Start: 2022-04-04 | End: 2022-04-06 | Stop reason: HOSPADM

## 2022-04-04 RX ORDER — AMLODIPINE BESYLATE 5 MG/1
5 TABLET ORAL DAILY
Status: DISCONTINUED | OUTPATIENT
Start: 2022-04-04 | End: 2022-04-06 | Stop reason: HOSPADM

## 2022-04-04 RX ORDER — SODIUM CHLORIDE FOR INHALATION 3 %
4 VIAL, NEBULIZER (ML) INHALATION
Status: DISCONTINUED | OUTPATIENT
Start: 2022-04-04 | End: 2022-04-06 | Stop reason: HOSPADM

## 2022-04-04 RX ADMIN — SODIUM BICARBONATE 1300 MG: 650 TABLET ORAL at 10:04

## 2022-04-04 RX ADMIN — HYDRALAZINE HYDROCHLORIDE 100 MG: 25 TABLET, FILM COATED ORAL at 09:04

## 2022-04-04 RX ADMIN — SODIUM CHLORIDE: 0.9 INJECTION, SOLUTION INTRAVENOUS at 04:04

## 2022-04-04 RX ADMIN — INSULIN ASPART 3 UNITS: 100 INJECTION, SOLUTION INTRAVENOUS; SUBCUTANEOUS at 04:04

## 2022-04-04 RX ADMIN — HEPARIN SODIUM 5000 UNITS: 5000 INJECTION INTRAVENOUS; SUBCUTANEOUS at 02:04

## 2022-04-04 RX ADMIN — CARVEDILOL 50 MG: 25 TABLET, FILM COATED ORAL at 09:04

## 2022-04-04 RX ADMIN — PIPERACILLIN AND TAZOBACTAM 4.5 G: 4; .5 INJECTION, POWDER, LYOPHILIZED, FOR SOLUTION INTRAVENOUS; PARENTERAL at 04:04

## 2022-04-04 RX ADMIN — PRAVASTATIN SODIUM 40 MG: 40 TABLET ORAL at 09:04

## 2022-04-04 RX ADMIN — Medication 10 ML: at 03:04

## 2022-04-04 RX ADMIN — ACETAMINOPHEN 650 MG: 325 TABLET ORAL at 05:04

## 2022-04-04 RX ADMIN — MUPIROCIN: 20 OINTMENT TOPICAL at 10:04

## 2022-04-04 RX ADMIN — LOSARTAN POTASSIUM 100 MG: 50 TABLET, FILM COATED ORAL at 09:04

## 2022-04-04 RX ADMIN — DOCUSATE SODIUM AND SENNOSIDES 1 TABLET: 8.6; 5 TABLET, FILM COATED ORAL at 10:04

## 2022-04-04 RX ADMIN — AMLODIPINE BESYLATE 5 MG: 5 TABLET ORAL at 09:04

## 2022-04-04 RX ADMIN — IPRATROPIUM BROMIDE AND ALBUTEROL SULFATE 3 ML: .5; 3 SOLUTION RESPIRATORY (INHALATION) at 07:04

## 2022-04-04 RX ADMIN — CARVEDILOL 50 MG: 25 TABLET, FILM COATED ORAL at 04:04

## 2022-04-04 RX ADMIN — FUROSEMIDE 40 MG: 40 TABLET ORAL at 09:04

## 2022-04-04 RX ADMIN — SODIUM BICARBONATE 1300 MG: 650 TABLET ORAL at 02:04

## 2022-04-04 RX ADMIN — IPRATROPIUM BROMIDE AND ALBUTEROL SULFATE 3 ML: .5; 3 SOLUTION RESPIRATORY (INHALATION) at 09:04

## 2022-04-04 RX ADMIN — SODIUM BICARBONATE 1300 MG: 650 TABLET ORAL at 09:04

## 2022-04-04 RX ADMIN — HEPARIN SODIUM 5000 UNITS: 5000 INJECTION INTRAVENOUS; SUBCUTANEOUS at 06:04

## 2022-04-04 RX ADMIN — HYDRALAZINE HYDROCHLORIDE 100 MG: 25 TABLET, FILM COATED ORAL at 06:04

## 2022-04-04 RX ADMIN — HEPARIN SODIUM 5000 UNITS: 5000 INJECTION INTRAVENOUS; SUBCUTANEOUS at 10:04

## 2022-04-04 RX ADMIN — INSULIN ASPART 1 UNITS: 100 INJECTION, SOLUTION INTRAVENOUS; SUBCUTANEOUS at 10:04

## 2022-04-04 RX ADMIN — IPRATROPIUM BROMIDE AND ALBUTEROL SULFATE 3 ML: .5; 3 SOLUTION RESPIRATORY (INHALATION) at 03:04

## 2022-04-04 RX ADMIN — ACETAMINOPHEN 650 MG: 325 TABLET ORAL at 03:04

## 2022-04-04 RX ADMIN — Medication 10 ML: at 06:04

## 2022-04-04 NOTE — PT/OT/SLP EVAL
Occupational Therapy   Evaluation    Name: Myesha Quinones  MRN: [id number]  Admitting Diagnosis:  Hyponatremia  Recent Surgery: * No surgery found *      Recommendations:     Discharge Recommendations:  (TBD likely HH OT/PT)  Discharge Equipment Recommendations:  other (see comments) (TBD)  Barriers to discharge:  Decreased caregiver support    Assessment:     Myesha Quinones is a 81 y.o. female with a medical diagnosis of Hyponatremia.  She presents with The primary encounter diagnosis was Hyponatremia. Diagnoses of Hypotension, Chest pain, SIRS (systemic inflammatory response syndrome), Hypothermia, initial encounter, Fatigue, unspecified type, Bradycardia, Hyperkalemia, Acute on chronic combined systolic and diastolic heart failure, ABPA (allergic bronchopulmonary aspergillosis), Obstructive sleep apnea, and Mycobacterium avium complex were also pertinent to this visit. Performance deficits affecting function: weakness, impaired functional mobilty, gait instability, impaired endurance, impaired balance, impaired self care skills, decreased lower extremity function, decreased upper extremity function, impaired cardiopulmonary response to activity.      Pt would benefit from cont OT services in order to maximize functional independence. Recommending TBD likely HH OT/PT upon d/c. Pt found EOB with PT upon OT entrance to room. Pt performed sit>stand from EOB with CGA from elevated ICU bed. Pt performed step t/f to bedside chair with CGA and use of RW. Pt with no c/o pain throughout session.     Rehab Prognosis: Good; patient would benefit from acute skilled OT services to address these deficits and reach maximum level of function.       Plan:     Patient to be seen 3 x/week to address the above listed problems via self-care/home management, therapeutic activities, therapeutic exercises  · Plan of Care Expires: 05/04/22  · Plan of Care Reviewed with: patient, family    Subjective     Chief Complaint: Mild  dizziness   Patient/Family Comments/goals: Return to PLOF    Occupational Profile:  Patient lives alone in 1 , no TAMEKA, WIS in bathroom w/SC.    Prior to admission, patients level of function was modified independent with use of a rollator, + Driving. Recent fall with syncope episodes at home.    Equipment used at home: rollator, walker, rolling, shower chair.     Upon discharge, patient will have assistance from family    Pain/Comfort:  · Pain Rating 1: 0/10  · Pain Rating Post-Intervention 1: 0/10    Patients cultural, spiritual, Taoist conflicts given the current situation: no    Objective:     Communicated with: nslenin prior to session.  Patient found EOB with PT with bed alarm, blood pressure cuff, telemetry, peripheral IV, pulse ox (continuous) upon OT entry to room.    General Precautions: Standard, fall   Orthopedic Precautions:N/A   Braces: N/A  Respiratory Status: Room air    Occupational Performance:    Bed Mobility:    · See PT note    Functional Mobility/Transfers:  · Patient completed Sit <> Stand Transfer with contact guard assistance  with  rolling walker from elevated ICU bed height   · Patient completed Bed <> Chair Transfer using Step Transfer technique with contact guard assistance with rolling walker    Activities of Daily Living:  · Upper Body Dressing: minimum assistance 2/2 line management    Cognitive/Visual Perceptual:  Cognitive/Psychosocial Skills:     -       Oriented to: Person, Place, Time and Situation   -       Follows Commands/attention:Follows multistep  commands  -       Memory: No Deficits noted  -       Mood/Affect/Coping skills/emotional control: Cooperative    Physical Exam:  Sensation: Per chart patient noted to have tingling sensation in bilateral hands.  Upper Extremity Range of Motion:     -       Right Upper Extremity: WFL except decreased shoulder flex  -       Left Upper Extremity: WFL except decreased shoulder flex  Upper Extremity Strength:    -       Right Upper  Extremity: 3+/5  -       Left Upper Extremity: 3+/5   Strength:    -       Right Upper Extremity: Fair  -       Left Upper Extremity: Fair    AMPAC 6 Click ADL:  AMPAC Total Score: 20    Treatment & Education:  Pt found EOB with PT upon OT entrance to room.   Pt performed sit>stand from EOB with CGA from elevated ICU bed.   Pt performed step t/f to bedside chair with CGA and use of RW.   Pt with no c/o pain throughout session.   Education:    Patient left up in chair with all lines intact, call button in reach and nsg present    GOALS:   Multidisciplinary Problems     Occupational Therapy Goals        Problem: Occupational Therapy    Goal Priority Disciplines Outcome Interventions   Occupational Therapy Goal     OT, PT/OT Ongoing, Progressing    Description: Goals to be met by: 5/4/2022     Patient will increase functional independence with ADLs by performing:    UE Dressing with Modified [place].  LE Dressing with Modified [place].  Grooming while standing with Stand-by Assistance.  Toileting from toilet with Modified [place] for hygiene and clothing management.   Step transfer with Modified [place] & appropriate AD.  Toilet transfer to toilet with Modified [place] & appropriate AD.                      History:     Past Medical History:   Diagnosis Date    Acute hypoxemic respiratory failure 12/19/2019    Acute right-sided thoracic back pain 12/09/2019    Allergy     Asthma     Basal cell carcinoma     left forehead    Basal cell carcinoma     left nose    Basal cell carcinoma 05/20/2015    right nose    Basal cell carcinoma 12/22/2015    left lower post neck    Basal cell carcinoma 12/03/2019    left ant scalp     Breast cancer     CAD (coronary artery disease)     Cardiomyopathy     Cardiomyopathy, ischemic     Cataract     CHF (congestive heart failure)     Chronic kidney disease, stage 3, mod decreased GFR 02/14/2017    Colon polyp 2011    Controlled type 2  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, with long-term current use of insulin 02/22/2018    COPD (chronic obstructive pulmonary disease)     COPD exacerbation 04/08/2018    Current mild episode of major depressive disorder without prior episode 01/25/2022    Defibrillator discharge     Diabetes mellitus     Diabetes mellitus type II     Diabetes with neurologic complications     Goiter     MNG    HX: breast cancer     Hyperlipidemia     Hypertension     Iron deficiency anemia 05/16/2017    Left kidney mass     Meningioma     Microalbuminuria due to type 2 diabetes mellitus 01/26/2022    Osteoporosis, postmenopausal     Pneumonia 12/08/2019    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    Proteinuria 1/21/2019    Pseudomonas pneumonia     Skin cancer     s/p excision    Sleep apnea     CPAP    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/2015    mid forehead    Unspecified vitamin D deficiency     UTI (urinary tract infection) 4/2/2022    Ventricular tachycardia     Vitamin B12 deficiency     Vitamin D deficiency disease        Past Surgical History:   Procedure Laterality Date    BASAL CELL CARCINOMA EXCISION      posterior neck and nose    BREAST BIOPSY      BREAST CYST EXCISION Left     BREAST SURGERY      CARDIAC DEFIBRILLATOR PLACEMENT      x 2    CATARACT EXTRACTION W/  INTRAOCULAR LENS IMPLANT Bilateral     CHOLECYSTECTOMY      COLONOSCOPY N/A 11/5/2019    Procedure: COLONOSCOPY;  Surgeon: Boaz Botello MD;  Location: [facility] ([address] [place] [address]);  Service: Endoscopy;  Laterality: N/A;  AICD - [employer] -     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    FRACTURE SURGERY      left elbow and wrist as a child    HYSTERECTOMY      MASTECTOMY Right     REPLACEMENT OF IMPLANTABLE CARDIOVERTER-DEFIBRILLATOR (ICD) GENERATOR N/A 12/17/2018    Procedure: REPLACEMENT, ICD GENERATOR;  Surgeon: Jan Mckeon MD;  Location: [facility];   Service: Cardiology;  Laterality: N/A;  DONNA, CRT-D gen change, MDT, MAC, SK, 3 Prep    REVISION OF SKIN POCKET FOR CARDIOVERTER-DEFIBRILLATOR  12/17/2018    Procedure: Revision, Skin Pocket, For Cardioverter-Defibrillator;  Surgeon: Jan Mckeon MD;  Location: [facility];  Service: Cardiology;;    SQUAMOUS CELL CARCINOMA EXCISION      remved from forehead    TONSILLECTOMY         Time Tracking:     OT Date of Treatment: 04/04/22  OT Start Time: 1130  OT Stop Time: 1145  OT Total Time (min): 15 min    Billable Minutes:Evaluation 10 co eval with PT    4/4/2022

## 2022-04-04 NOTE — PLAN OF CARE
The sw met with the pt to complete the assessment. The pt lives alone in [place]. The pt's independent with her adl's and uses a rollator for ambulation in the community. The pt's current with [facility](Skilled Nurse only)and wants to resume with them at d/c. The pt still drives but her son Herminio Quinones [phone] will transport her home at d/c. The pt list her dtr Betsy Bowen [phone] as her POA. The sw completed the white board in the pt's room and gave her a d/c brochure with her name and contact info on it. The sw encouraged her to call if she has any further questions or concerns.        04/04/22 1416   Discharge Assessment   Assessment Type Discharge Planning Assessment   Confirmed/corrected address, phone number and insurance Yes   Confirmed Demographics Correct on Facesheet   Source of Information patient   When was your last doctors appointment? 04/01/22   Communicated ALIX with patient/caregiver No   Reason For Admission Hyponatremia   Lives With alone   Do you expect to return to your current living situation? Yes   Do you have help at home or someone to help you manage your care at home? Yes   Who are your caregiver(s) and their phone number(s)? Betsy Bowen(dtr/POA)[phone]   Prior to hospitilization cognitive status: Alert/Oriented   Current cognitive status: Alert/Oriented   Walking or Climbing Stairs Difficulty ambulation difficulty, requires equipment   Dressing/Bathing Difficulty none   Home Accessibility wheelchair accessible   Home Layout Able to live on 1st floor   Equipment Currently Used at Home rollator;shower chair  (pt uses her rollator for ambulation in the community)   Readmission within 30 days? No   Patient currently being followed by outpatient case management? No   Do you currently have service(s) that help you manage your care at home?   (the pt's current with [facility] and wants to resume)   Do you take prescription medications? Yes   Do you have prescription coverage? Yes    Coverage [employer]   Do you have any problems affording any of your prescribed medications? No  (the pt receives her meds affordably at [facility] in [place])   Is the patient taking medications as prescribed? yes   Who is going to help you get home at discharge? Herminio Quinones(son)[phone]   How do you get to doctors appointments? car, drives self   Are you on dialysis? No   Do you take coumadin? No   Discharge Plan A Home Health  (the pt's current with [facility])   Discharge Plan B Other  (TBD)   DME Needed Upon Discharge  other (see comments)  (TBD)   Discharge Plan discussed with: Patient   Discharge Barriers Identified None

## 2022-04-04 NOTE — ASSESSMENT & PLAN NOTE
Follows Urology here at [place]  De La Cruz catheter placed in clinic on day prior to admit  Bladder scan ordered prn  Will need strict I's and O's

## 2022-04-04 NOTE — PLAN OF CARE
Pt received from ICU this PM. Pt is A+Ox4, [place] with bilateral hearing aids in place. De La Cruz catheter in place draining clear yellow urine. Tolerating diet well at this time. Elevated BP noted, per Dr Reyes PO hydralazine given early. Safety precautions maintained.

## 2022-04-04 NOTE — PT/OT/SLP EVAL
"Physical Therapy Evaluation    Patient Name:  Myesha Quinones   MRN:  [id number]    Recommendations:     Discharge Recommendations:  home health PT, home health OT   Discharge Equipment Recommendations: none   Barriers to discharge: Lives alone, current functional status    Assessment:     Myesha Quinones is a 81 y.o. female admitted with a medical diagnosis of Hyponatremia.  She presents with the following impairments/functional limitations:  weakness, impaired endurance, impaired self care skills, impaired functional mobilty, decreased lower extremity function, gait instability, impaired balance, impaired cardiopulmonary response to activity.  Patient seen for physical therapy evaluation.  Anticipate patient will benefit from Home Health PT/OT after discharge.  No DME needs.      Rehab Prognosis: Good; patient would benefit from acute skilled PT services to address these deficits and reach maximum level of function.    Recent Surgery: * No surgery found *      Plan:     During this hospitalization, patient to be seen 5 x/week to address the identified rehab impairments via gait training, therapeutic activities, therapeutic exercises, neuromuscular re-education and progress toward the following goals:    · Plan of Care Expires:  05/04/22    Subjective     Chief Complaint: "I would like to get out of this bed!"  Patient/Family Comments/goals: To return home at [facility]  Pain/Comfort:  · Pain Rating 1: 0/10  · Pain Rating Post-Intervention 1: 0/10    Patients cultural, spiritual, Alevism conflicts given the current situation: no    Living Environment:  Patient lives alone in 1 SH, no TAMEKA, WIS in bathroom.    Prior to admission, patients level of function was modified independent with use of a rollator, + Driving.  Recent fall with syncope episodes at home.  Equipment used at home: rollator, walker, rolling, shower chair.  DME owned (not currently used): none.  Upon discharge, patient will have assistance from " family.    Objective:     Communicated with nurse Hammond prior to session.  Patient found supine with bed alarm, blood pressure cuff, telemetry, pulse ox (continuous), lauren catheter  upon PT entry to room.    General Precautions: Standard, fall   Orthopedic Precautions:N/A   Braces: N/A  Respiratory Status: Room air    Exams:  · Cognitive Exam:  Patient is oriented to Person, Place, Time and Situation  · Gross Motor Coordination:  WFL  · Postural Exam:  Patient presented with the following abnormalities:    · -       Rounded shoulders  · -       Forward head  · -       Kyphosis  · Sensation:    · -       Intact  light/touch B LEs, though patient complains of neuropathy in B feet  · Skin Integrity/Edema:      · -       Skin integrity: Visible skin intact  · -       Edema: Mild B LEs  · RLE ROM: WFL  · RLE Strength: Deficits: grossly 3+ to 4/5 throughout  · LLE ROM: WFL  · LLE Strength: Deficits: grossly 3+ to 4/5 throughout    Functional Mobility:  · Bed Mobility:     · Rolling Left:  stand by assistance  · Scooting: stand by assistance  · Supine to Sit: stand by assistance  · Transfers:     · Sit to Stand:  contact guard assistance with rolling walker  · Gait: x 5-6 steps with RW to bedside chair with CGA. see below for vitals  · Balance: Sitting:  EOB, no LOB, SBA  Standing:  Static:  SBA  Dynamic:  no LOB, requires RW, slightly unsteady    Therapeutic Activities and Exercises:   Patient with complaints of mild dizziness upon sitting and standing.    Supine:  /72, Sittin/72  Standin/60  Symptoms resolve in 1-2 minutes.  After sitting several minutes, BP returns to 138/82    Educated on importance of OOB.  Educated to call for assist to return back to bed.      AM-PAC 6 CLICK MOBILITY  Total Score:16     Patient left up in chair with all lines intact, call button in reach and nurse Limon present.    GOALS:   Multidisciplinary Problems     Physical Therapy Goals        Problem: Physical Therapy     Goal Priority Disciplines Outcome Goal Variances Interventions   Physical Therapy Goal     PT, PT/OT Ongoing, Progressing     Description: Goals to be met by:  2022     Patient will increase functional independence with mobility by performin. Supine to sit with Modified [place]  2. Sit to supine with Modified [place]  3. Sit to stand transfer with Modified [place]  4. Gait  x 150 feet with Supervision using Rolling Walker.   5.  Tolerate OOB x 2 hours BID                     History:     Past Medical History:   Diagnosis Date    Acute hypoxemic respiratory failure 2019    Acute right-sided thoracic back pain 2019    Allergy     Asthma     Basal cell carcinoma     left forehead    Basal cell carcinoma     left nose    Basal cell carcinoma 2015    right nose    Basal cell carcinoma 2015    left lower post neck    Basal cell carcinoma 2019    left ant scalp     Breast cancer     CAD (coronary artery disease)     Cardiomyopathy     Cardiomyopathy, ischemic     Cataract     CHF (congestive heart failure)     Chronic kidney disease, stage 3, mod decreased GFR 2017    Colon polyp     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, with long-term current use of insulin 2018    COPD (chronic obstructive pulmonary disease)     COPD exacerbation 2018    Current mild episode of major depressive disorder without prior episode 2022    Defibrillator discharge     Diabetes mellitus     Diabetes mellitus type II     Diabetes with neurologic complications     Goiter     MNG    HX: breast cancer     Hyperlipidemia     Hypertension     Iron deficiency anemia 2017    Left kidney mass     Meningioma     Microalbuminuria due to type 2 diabetes mellitus 2022    Osteoporosis, postmenopausal     Pneumonia 2019    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    Proteinuria 1/21/2019    Pseudomonas pneumonia     Skin cancer     s/p excision    Sleep apnea     CPAP    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/2015    mid forehead    Unspecified vitamin D deficiency     UTI (urinary tract infection) 4/2/2022    Ventricular tachycardia     Vitamin B12 deficiency     Vitamin D deficiency disease        Past Surgical History:   Procedure Laterality Date    BASAL CELL CARCINOMA EXCISION      posterior neck and nose    BREAST BIOPSY      BREAST CYST EXCISION Left     BREAST SURGERY      CARDIAC DEFIBRILLATOR PLACEMENT      x 2    CATARACT EXTRACTION W/  INTRAOCULAR LENS IMPLANT Bilateral     CHOLECYSTECTOMY      COLONOSCOPY N/A 11/5/2019    Procedure: COLONOSCOPY;  Surgeon: Boaz Botello MD;  Location: [facility] ([address] [place] [address]);  Service: Endoscopy;  Laterality: N/A;  AICD - [employer] - sm    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    FRACTURE SURGERY      left elbow and wrist as a child    HYSTERECTOMY      MASTECTOMY Right     REPLACEMENT OF IMPLANTABLE CARDIOVERTER-DEFIBRILLATOR (ICD) GENERATOR N/A 12/17/2018    Procedure: REPLACEMENT, ICD GENERATOR;  Surgeon: Jan Mckeon MD;  Location: [facility];  Service: Cardiology;  Laterality: N/A;  DONNA, CRT-D gen sulema, MDT, MAC, SK, 3 Prep    REVISION OF SKIN POCKET FOR CARDIOVERTER-DEFIBRILLATOR  12/17/2018    Procedure: Revision, Skin Pocket, For Cardioverter-Defibrillator;  Surgeon: Jan Mckeon MD;  Location: [facility];  Service: Cardiology;;    SQUAMOUS CELL CARCINOMA EXCISION      remved from forehead    TONSILLECTOMY         Time Tracking:     PT Received On: 04/04/22  PT Start Time: 1117     PT Stop Time: 1144  PT Total Time (min): 27 min     Billable Minutes: Evaluation 8 and Therapeutic Activity 8      04/04/2022

## 2022-04-04 NOTE — ASSESSMENT & PLAN NOTE
Follows FAUSTINA Underwood in the cardiology clinic  Last TTE from 2/2022:  · The left ventricle is normal in size with mildly decreased systolic function.  · The estimated ejection fraction is 45-50%. There is left ventricular global hypokinesis.  · Grade II left ventricular diastolic dysfunction.  · Mild mitral regurgitation.  · Normal right ventricular size with normal right ventricular systolic function.  · Mild tricuspid regurgitation.  · The estimated PA systolic pressure is 46 mmHg.  · Normal central venous pressure (3 mmHg).  · There is pulmonary hypertension.  · Severe left atrial enlargement.    SBP range while in the ED in the 90's  - continue home lasix

## 2022-04-04 NOTE — ASSESSMENT & PLAN NOTE
Holding Vfend  Na chronically low but date of admission noted at 117  Patient is AAOx3  Fluid restriction ordered  ICU level care for monitoring overnight  - serum osm wnl, suggestive of pseudohyponatremia but unclear what alternative cause would be (normal protein, glucose, and TG; no report of surgical irrigant used during recent lauren replacement)  - repeat test confirm pseudohyponatremia  -peripheral smear; can consider Heme Onc evaluation  - Nephrology consulted, added bicarb

## 2022-04-04 NOTE — PLAN OF CARE
Pt would benefit from cont OT services in order to maximize functional independence. Recommending TBD likely HH OT/PT upon d/c. Pt found EOB with PT upon OT entrance to room. Pt performed sit>stand from EOB with CGA from elevated ICU bed. Pt performed step t/f to bedside chair with CGA and use of RW. Pt with no c/o pain throughout session.     Problem: Occupational Therapy  Goal: Occupational Therapy Goal  Description: Goals to be met by: 5/4/2022     Patient will increase functional independence with ADLs by performing:    UE Dressing with Modified [place].  LE Dressing with Modified [place].  Grooming while standing with Stand-by Assistance.  Toileting from toilet with Modified [place] for hygiene and clothing management.   Step transfer with Modified [place] & appropriate AD.  Toilet transfer to toilet with Modified [place] & appropriate AD.     Outcome: Ongoing, Progressing

## 2022-04-04 NOTE — NURSING TRANSFER
Nursing Transfer Note      4/4/2022     Reason patient is being transferred: MD order. ICU bed not indicated per MD.    Transfer To: Rm [id number]    Transfer via bed    Transfer with belongings    Transported by Primary nurse and transportation    Medicines sent: Yes    Any special needs or follow-up needed: High fall risk    Chart send with patient: Yes    Notified: daughter    Patient reassessed on 04/04/2022    Upon arrival to floor: patient oriented to room, call bell in reach and bed in lowest position    ADAM José at bedside.

## 2022-04-04 NOTE — PLAN OF CARE
The sw faxed the pt's info to her current  agency [facility] via "[employer].        04/04/22 7906   Post-Acute Status   Post-Acute Authorization Home Health   Home Health Status Referrals Sent   Discharge Delays None known at this time   Discharge Plan   Discharge Plan A Home Health

## 2022-04-04 NOTE — PROGRESS NOTES
[facility] Medicine  Progress Note    Patient Name: Myesha Quinones  MRN: [id number]  Patient Class: IP- Inpatient   Admission Date: 4/2/2022  Length of Stay: 2 days  Attending Physician: Danish Reyes, *  Primary Care Provider: Catrachita Rothman MD        Subjective:     Principal Problem:Hyponatremia        HPI:  Ms. Myesha Quinones is a 82 y/o F with a pmh of ischemic cardiomyopathy with pacemaker placement, CHF (EF 45%), diabetes, ABPA, COPD, and asthma. Her PCP is Dr. Catrachita Rothman. She denies illicit drug use, alcohol consumption, and cigarette smoking.     The patient presented to the ED here at [facility] with complaints of generalized weakness.  The patient's daughter states she started Voriconazole this morning for Aspergillus infection and pretty soon afterwards started to feel really weak.  Her fatigue is new since this morning after consuming the Voriconazole per the patient's family who is at the bedside. The family states the fatigue is constant, progressive, and associated with difficulty with ambulation since this am.  The patient typically lives alone and cares for herself per the family.  The patient also reports mild shortness of breath--she is not on home O2.  She denies chest pain, headache, fever, abdominal pain, nausea, vomiting, hematuria, diarrhea, constipation, and black/bloody stools.  She was seen by Urology on yesterday and had a new catheter placed for urinary retention.  They obtained a dipstick and sent for culture but did not start the patient on any antibiotics because they wanted to see the sensitivities per chart review.      Her ED workup includes: hemoglobin 8.5, Na--117, K 6.0>6.1, CO2 16, BUN--72, Cr--2.6, calcium 8.3, albumin, uric acid 7.3, BNP--302, troponin 0.010, lactate 0.7. U/A positive for UTI. Blood cultures and urine cultures are pending. She was given IV Vancomycin and Zosyn in the ED. Duo nebs were given in the ED. She was also  given NS bolus in the ED. Lokelma was ordered to be given in the ED. Procal pending. CXR-- Chronic coarse interstitial prominence, similar to prior.  No large focal consolidation or significant detrimental change. We have consulted Nephrology, pulmonary, and ID while this patient is inpatient. This patient will be admitted to the ICU for [facility] service under the care of Brayan Frye NP under the collaboration of Dr. Caroline Anderson.      Overview/Hospital Course:  No notes on file    Interval History:  Feeling weak and with some shortness of breath.  Does not feel like she is herself today, but overall improving.  Will consult PT/OT today.  Stable for step-down out of intensive care unit.    Review of Systems   Constitutional:  Positive for appetite change and fatigue.   Respiratory:  Positive for cough and shortness of breath.    Cardiovascular:  Negative for chest pain.   Gastrointestinal:  Negative for abdominal pain and diarrhea.   Neurological:  Positive for weakness.   Objective:     Vital Signs (Most Recent):  Temp: 98.9 °F (37.2 °C) (04/04/22 1145)  Pulse: 88 (04/04/22 1225)  Resp: (P) 20 (04/04/22 1245)  BP: 138/82 (04/04/22 1145)  SpO2: (!) 91 % (04/04/22 1225)   Vital Signs (24h Range):  Temp:  [97.8 °F (36.6 °C)-100.6 °F (38.1 °C)] 98.9 °F (37.2 °C)  Pulse:  [] 88  Resp:  [20-36] (P) 20  SpO2:  [90 %-99 %] 91 %  BP: (135-197)/() 138/82     Weight: 65 kg (143 lb 4.8 oz)  Body mass index is 25.38 kg/m².    Intake/Output Summary (Last 24 hours) at 4/4/2022 1435  Last data filed at 4/4/2022 0600  Gross per 24 hour   Intake 360 ml   Output 4125 ml   Net -3765 ml        Physical Exam  Vitals and nursing note reviewed.   Constitutional:       Appearance: She is obese. She is ill-appearing.   HENT:      Head: Normocephalic and atraumatic.      Nose: Nose normal.      Mouth/Throat:      Mouth: Mucous membranes are moist.   Eyes:      Extraocular Movements: Extraocular movements intact.       Conjunctiva/sclera: Conjunctivae normal.      Pupils: Pupils are equal, round, and reactive to light.   Cardiovascular:      Rate and Rhythm: Regular rhythm. Bradycardia present.      Pulses: Normal pulses.      Heart sounds: Normal heart sounds. No murmur heard.  Pulmonary:      Effort: Pulmonary effort is normal.      Breath sounds: Rhonchi present. No wheezing.   Abdominal:      General: Bowel sounds are normal. There is no distension.      Palpations: Abdomen is soft.      Tenderness: There is no abdominal tenderness. There is no guarding.   Musculoskeletal:         General: Swelling present.      Cervical back: Normal range of motion and neck supple.      Right lower leg: Edema present.      Left lower leg: Edema present.   Skin:     General: Skin is warm and dry.      Capillary Refill: Capillary refill takes 2 to 3 seconds.      Coloration: Skin is pale.      Findings: Bruising present. No erythema or rash.   Neurological:      Mental Status: She is alert and oriented to person, place, and time. Mental status is at baseline.   Psychiatric:         Mood and Affect: Mood normal.         Behavior: Behavior normal.         Thought Content: Thought content normal.         Judgment: Judgment normal.       Significant Labs: All pertinent labs within the past 24 hours have been reviewed.    Significant Imaging: I have reviewed all pertinent imaging results/findings within the past 24 hours.      Assessment/Plan:      * Hyponatremia  Holding Vfend  Na chronically low but date of admission noted at 117  Patient is AAOx3  Fluid restriction ordered  ICU level care for monitoring overnight  - serum osm wnl, suggestive of pseudohyponatremia but unclear what alternative cause would be (normal protein, glucose, and TG; no report of surgical irrigant used during recent lauren replacement)  - repeat test confirm pseudohyponatremia  -peripheral smear; can consider Heme Onc evaluation  - Nephrology consulted, added  bicarb    Mycobacterium avium complex  -reportedly respiratory culture also growing MAC  -follow-up with pulmonology as outpatient    Sepsis  This patient does have evidence of infective focus  My overall impression is sepsis. Vital signs were reviewed and noted in progress note.  Antibiotics given-   Antibiotics (From admission, onward)            Start     Stop Route Frequency Ordered    04/04/22 1601  piperacillin-tazobactam 4.5 g in dextrose 5 % 100 mL IVPB (ready to mix system)  (Sepsis Workup)         -- IV Every 12 hours (non-standard times) 04/04/22 0751    04/02/22 2130  mupirocin 2 % ointment         04/07 2059 Nasl 2 times daily 04/02/22 2018        Cultures were taken-   Microbiology Results (last 7 days)     Procedure Component Value Units Date/Time    Blood culture x two cultures. Draw prior to antibiotics. [[id number]] Collected: 04/02/22 1851    Order Status: Completed Specimen: Blood from Peripheral, Antecubital, Left Updated: 04/04/22 0613     Blood Culture, Routine No Growth to date      No Growth to date    Narrative:      Aerobic and anaerobic    Blood culture x two cultures. Draw prior to antibiotics. [[id number]] Collected: 04/02/22 1854    Order Status: Completed Specimen: Blood from Peripheral, Wrist, Left Updated: 04/04/22 0613     Blood Culture, Routine No Growth to date      No Growth to date    Narrative:      Aerobic and anaerobic    Blood culture [[id number]] Collected: 04/02/22 2105    Order Status: Completed Specimen: Blood Updated: 04/04/22 0613     Blood Culture, Routine No Growth to date      No Growth to date    Urine culture [[id number]]  (Abnormal) Collected: 04/02/22 2108    Order Status: Completed Specimen: Urine Updated: 04/04/22 0128     Urine Culture, Routine GRAM NEGATIVE YA  > 100,000 cfu/ml  Identification and susceptibility pending      Narrative:      Specimen Source->Urine    Blood culture [[id number]]     Order Status: Canceled Specimen: Blood         Latest lactate  reviewed, they are-  Recent Labs   Lab 04/02/22  1847   LACTATE 0.7       Concerned for sepsis 2/2 hypothermia, tachypnea, and hypotension while in the ED  Source- pyelo     Source control Achieved by- IV Vancomycin and IV Zosyn. She was also given IVFs in the ED.  - de-escalate to zosyn for now, likely further de-escalation tomorrow pending culture        Acute pyelonephritis  UA dirty with WBC clumps  Cont empiric zosyn  Will follow urine cultures      ABPA (allergic bronchopulmonary aspergillosis)  Chronic bronchitis, unspecified chronic bronchitis type      Follows Dr. Kelvin Maki with pulmonary and Dr. Catia Amaya with ID outpatient. Was seen by Dr. Amaya on 3/29 in clinic and was started on Vfend at that time. Patient stated she began this drug on today and her symptoms started soon afterwards.  Per chart review from IDs notes:  An 81-year-old woman with CAD, HTN, ischemic cardiomyopathy, HFrEF-45-50%, diastolic HF, s/p biventricular ICD, DM2 with polyneuropathy, COPD, and severe persistent asthma who was referred for management of allergic bronchopulmonary aspergillosis (ABPA). Mrs. Quinones has experienced chronic progressive shortness of breath with wheezing and congestion. She feels as though she has mucous production but is unable to expectorate. She has completed a short course of steroids without improvement as well as antibiotics such as doxycyline and Augmentin. She was evaluated by pulmonologist Dr. Maki with work up revealing elevated serum IgE levels, as well as mucous plugging on CT of the chest. Sputum cultures on 3/9/22 are positive for Aspergillus species.     --Consulted Pulmonary and ID while inpatient  --Will hold Vfend until evaluated by ID and pulmonary--cont to watch electrolytes   --She was started on IV Vancomycin and Zosyn empirically by the ED--will cont zosyn for now pending urine cultures  --Follow blood cultures   --CXR--Chronic coarse interstitial prominence, similar to prior.  No  large focal consolidation or significant detrimental change  --Cont duo nebs and Spiriva home meds    Urinary retention  Follows Urology here at [place]  De La Cruz catheter placed in clinic on day prior to admit  Bladder scan ordered prn  Will need strict I's and O's      Metabolic acidosis  - bicarb  - nephro following      Current mild episode of major depressive disorder without prior episode  Stable.  Calm on exam.      Obstructive sleep apnea  Chronic. continue nightly CPAP      Chronic combined systolic and diastolic heart failure  Follows FAUSTINA Underwood in the cardiology clinic  Last TTE from 2/2022:  · The left ventricle is normal in size with mildly decreased systolic function.  · The estimated ejection fraction is 45-50%. There is left ventricular global hypokinesis.  · Grade II left ventricular diastolic dysfunction.  · Mild mitral regurgitation.  · Normal right ventricular size with normal right ventricular systolic function.  · Mild tricuspid regurgitation.  · The estimated PA systolic pressure is 46 mmHg.  · Normal central venous pressure (3 mmHg).  · There is pulmonary hypertension.  · Severe left atrial enlargement.    SBP range while in the ED in the 90's  - continue home lasix    Type 2 diabetes mellitus with hyperglycemia, with long-term current use of insulin  Hemoglobin A1c 5.9 from 2/22/2022  POCT 4 times daily  Low dose SSI prn  Diabetic/ cardiac diet ordered       Moderate asthma  Cont duo nebs and Spiriva       Hyperlipidemia associated with type 2 diabetes mellitus  Cont pravastatin 40 mg PO daily       CKD (chronic kidney disease) stage 4, GFR 15-29 ml/min  Cr 2.6 which is at baseline  De La Cruz catheter was placed in the urology clinic for urinary retention  Family reports low urinary output  Strict I's and O's  Daily weights ordered  Bladder scan ordered prn  Nephrology consulted  Avoid nephrotoxic agents  Renally dose meds    Cardiomyopathy, ischemic  Chronic problem.  TTE from 2/22  reviewed.    Weakness  -patient is frail and weak  -PT/OT consult      Chronic obstructive pulmonary disease with acute exacerbation  Chronic problem  Will cont duo nebs  Not on home O2 per the family  Cont Spiriva daily at discharge, holding while on nebs      Iron deficiency anemia  Hemoglobin 8.5  Hemoglobin 8.7-9.1 on baseline  Follow     Hypertension associated with diabetes  - soft BP on admission but now uptrending  - resumed home coreg, losartan, hydralazine, and lasix; added amlodipine 5    Biventricular ICD (implantable cardioverter-defibrillator) in place  Chronic. No reported issues with this device.        VTE Risk Mitigation (From admission, onward)         Ordered     heparin (porcine) injection 5,000 Units  Every 8 hours         04/02/22 1953     IP VTE HIGH RISK PATIENT  Once         04/02/22 1953     Place sequential compression device  Until discontinued         04/02/22 1953                Discharge Planning   ALIX: 4/6/2022     Code Status: Full Code   Is the patient medically ready for discharge?:     Reason for patient still in hospital (select all that apply): Patient trending condition, Treatment, Consult recommendations and PT / OT recommendations  Discharge Plan A: Home Health (the pt's current with [facility] )                Danish Reyes MD  Department of Hospital Medicine   [facility] - Intensive Care

## 2022-04-04 NOTE — PROGRESS NOTES
Pharmacist Renal Dose Adjustment Note    Myesha Quinones is a 81 y.o. female being treated with the medication zosyn    Patient Data:    Vital Signs (Most Recent):  Temp: 97.8 °F (36.6 °C) (04/03/22 1530)  Pulse: 103 (04/04/22 0349)  Resp: (!) 36 (04/04/22 0349)  BP: (!) 197/79 (04/03/22 1800)  SpO2: (!) 94 % (04/04/22 0349)   Vital Signs (72h Range):  Temp:  [94 °F (34.4 °C)-98 °F (36.7 °C)]   Pulse:  []   Resp:  [18-44]   BP: ()/()   SpO2:  [77 %-99 %]      Recent Labs   Lab 04/03/22  1048 04/03/22  1945 04/04/22  0301   CREATININE 2.6* 2.7* 2.5*     Serum creatinine: 2.5 mg/dL (H) 04/04/22 0301  Estimated creatinine clearance: 15.7 mL/min (A)    Medication:Zosyn dose: 4.5 g frequency q 8 hours will be changed to medication:zosyn dose:4.5g frequency:q 12 hours    Pharmacist's Name: Karolina Davenport  Pharmacist's Extension: [phone]

## 2022-04-04 NOTE — PROGRESS NOTES
General Infectious Diseases: Progress Note    Consult Requested By: Danish Reyes, *    Reason for Consult: UTI, ABPA    Assessment/Recommendations:     UTI (urinary tract infection)  -pseudomonas and GNR (assume second urine culture is pseudomonas as well  - would de-escalate to PO cipro 500mg BID  for 7 day course total     ABPA (allergic bronchopulmonary aspergillosis)  80 yo F with a PMH notable for allergic bronchopulmonary aspergillosis, KHOA, combined HF, DM, asthma, COPD, HTN, and ischemic CM who presented to the ED on 4/2/22 due to generalized weakness of one day's duration.      -unlikely that one dose of voriconazole caused her admission  - sodium closer to baseline, would restart voriconazole with steroids and monitor  - as she requested to change ID care to [facility] as it is closer to home, please have SW schedule discharge follow up in Dr. Smart's clinic in 2-3 weeks    AFB +MAC x 1  -clinical picture does not fully support MAC infection  -please repeat AFB prior to discharge   -follow up with pulmonology     Thanks for the consult. ID will sign off for now but please contact us with any follow up questions.     Madelyn Pratt MD  Internal Medicine/Emergency Medicine, PGY-5  10:49 AM 04/04/2022       SUBJECTIVE:     History of Present Illness:  80 yo F with a PMH notable for acute bronchopulmonary aspergillosis, KHOA, combined HF, DM, asthma, COPD, HTN, and ischimic CM who presented to the ED on 4/2/22 due to generalized weakness of one day's duration. The patient states that had taken her first dose of voriconazole for her ABPA earlier on the day of presentation and began to experience generalized weakness with paresthesias of her fingers. On arrival to the ED, labs were notable for a sodium of 117 from a baseline of approximately 130. She was then admitted to the ICU for closer monitoring in symptomatic hyponatremia. Urine cx with GNRs. Has been on steroids in the past but not currently  taking at home. Was not given itraconazole as first line ABPA therapy due to CHF. On zosyn currently    Interval History: No events overnight per nursing. This morning sitting up in chair, states she feels much better today.  Denies N/V vision changes, headache, blurry vision.  Tolerated breakfast.      Pertinent Medications:  Antibiotics (From admission, onward)            Start     Stop Route Frequency Ordered    04/04/22 1601  piperacillin-tazobactam 4.5 g in dextrose 5 % 100 mL IVPB (ready to mix system)  (Sepsis Workup)         -- IV Every 12 hours (non-standard times) 04/04/22 0751    04/02/22 2130  mupirocin 2 % ointment         04/07 2059 Nasl 2 times daily 04/02/22 2018            Review of Symptoms:  Per HPI, all other systems that were reviewed are negative.     OBJECTIVE:     Vital Signs (Most Recent)  Temp: 99.4 °F (37.4 °C) (04/04/22 0730)  Pulse: 90 (04/04/22 0935)  Resp: 20 (04/04/22 0935)  BP: (!) 157/69 (04/04/22 0917)  SpO2: 97 % (04/04/22 0935)    Temperature Range Min/Max (Last 24H):  Temp:  [97.8 °F (36.6 °C)-100.6 °F (38.1 °C)]     Physical Exam:   Gen: Nontoxic, comfortable, no acute distress.    HEENT: PERRL. No scleral icterus. EOMI intact. No sinus tenderness. OP clear.  Pulmonary: Non labored. Coarse breath sounds bilaterally  Cardiac: Normal S1 & S2 w/o rubs/murmurs/gallops.   Abdomen: Soft, nontender, nondistended. No rebound or guarding.   Extremities: No clubbing, cyanosis, or peripheral edema. Distal pulses symmetric..  Musculoskeletal: no joint deformities or effusions.  Neurological:  Sleepy but awakens appropriately  Skin: No jaundice, rashes, or visible lesions.      Laboratory:  CBC:   Lab Results   Component Value Date    WBC 11.27 04/04/2022    HGB 9.0 (L) 04/04/2022    HCT 26.8 (L) 04/04/2022    MCV 88 04/04/2022     04/04/2022       BMP:   Recent Labs   Lab 04/04/22  0301   *   *   K 4.5   CL 99   CO2 20*   BUN 59*   CREATININE 2.5*   CALCIUM 8.7   MG 2.0        LFT:   Lab Results   Component Value Date    ALT 27 04/02/2022    AST 26 04/02/2022    ALKPHOS 121 04/02/2022    BILITOT 0.3 04/02/2022       Microbiology:  BCx 4/2 x 3: NGTD   UCx 4/2: GNR  UCx 4/1: pseudomonas (pan sensitive)      Diagnostic Results: Reviewed       ASSESSMENT/PLAN:     Active Hospital Problems    Diagnosis  POA    *Hyponatremia [E87.1]  Yes    Hypothermia [T68.XXXA]  Yes    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]  Yes    Acute pyelonephritis [N10]  Yes    Sepsis [A41.9]  Yes    Urinary retention [R33.9]  Yes     82 yo F with urinary retention.  Lauren placed 2/28/22.   --recurrent retention, lauren replaced 4/1/22      Metabolic acidosis [E87.2]  Yes    Current mild episode of major depressive disorder without prior episode [F32.0]  Yes    Chronic bronchitis, unspecified chronic bronchitis type [J42]  Yes    Obstructive sleep apnea [G47.33]  Yes    Chronic combined systolic and diastolic heart failure [I50.42]  Yes    Moderate asthma [J45.909]  Yes    Hyperlipidemia associated with type 2 diabetes mellitus [E11.69, E78.5]  Yes    CKD (chronic kidney disease) stage 4, GFR 15-29 ml/min [N18.4]  Yes    Cardiomyopathy, ischemic [I25.5]  Yes    Chronic obstructive pulmonary disease with acute exacerbation [J44.1]  Yes    Type 2 diabetes mellitus with hyperglycemia, with long-term current use of insulin [E11.65, Z79.4]  Not Applicable    Iron deficiency anemia [D50.9]  Yes    Hypertension associated with diabetes [E11.59, I15.2]  Yes    Biventricular ICD (implantable cardioverter-defibrillator) in place [Z95.810]  Yes      Resolved Hospital Problems   No resolved problems to display.       Plan: Please see top of page

## 2022-04-04 NOTE — ASSESSMENT & PLAN NOTE
Chronic bronchitis, unspecified chronic bronchitis type      Follows Dr. Kelvin Maki with pulmonary and Dr. Catia Amaya with ID outpatient. Was seen by Dr. Amaya on 3/29 in clinic and was started on Vfend at that time. Patient stated she began this drug on today and her symptoms started soon afterwards.  Per chart review from IDs notes:  An 81-year-old woman with CAD, HTN, ischemic cardiomyopathy, HFrEF-45-50%, diastolic HF, s/p biventricular ICD, DM2 with polyneuropathy, COPD, and severe persistent asthma who was referred for management of allergic bronchopulmonary aspergillosis (ABPA). Mrs. Quinones has experienced chronic progressive shortness of breath with wheezing and congestion. She feels as though she has mucous production but is unable to expectorate. She has completed a short course of steroids without improvement as well as antibiotics such as doxycyline and Augmentin. She was evaluated by pulmonologist Dr. Maki with work up revealing elevated serum IgE levels, as well as mucous plugging on CT of the chest. Sputum cultures on 3/9/22 are positive for Aspergillus species.     --Consulted Pulmonary and ID while inpatient  --Will hold Vfend until evaluated by ID and pulmonary--cont to watch electrolytes   --She was started on IV Vancomycin and Zosyn empirically by the ED--will cont zosyn for now pending urine cultures  --Follow blood cultures   --CXR--Chronic coarse interstitial prominence, similar to prior.  No large focal consolidation or significant detrimental change  --Cont duo nebs and Spiriva home meds

## 2022-04-04 NOTE — SUBJECTIVE & OBJECTIVE
Interval History:  Feeling weak and with some shortness of breath.  Does not feel like she is herself today, but overall improving.  Will consult PT/OT today.  Stable for step-down out of intensive care unit.    Review of Systems   Constitutional:  Positive for appetite change and fatigue.   Respiratory:  Positive for cough and shortness of breath.    Cardiovascular:  Negative for chest pain.   Gastrointestinal:  Negative for abdominal pain and diarrhea.   Neurological:  Positive for weakness.   Objective:     Vital Signs (Most Recent):  Temp: 98.9 °F (37.2 °C) (04/04/22 1145)  Pulse: 88 (04/04/22 1225)  Resp: (P) 20 (04/04/22 1245)  BP: 138/82 (04/04/22 1145)  SpO2: (!) 91 % (04/04/22 1225)   Vital Signs (24h Range):  Temp:  [97.8 °F (36.6 °C)-100.6 °F (38.1 °C)] 98.9 °F (37.2 °C)  Pulse:  [] 88  Resp:  [20-36] (P) 20  SpO2:  [90 %-99 %] 91 %  BP: (135-197)/() 138/82     Weight: 65 kg (143 lb 4.8 oz)  Body mass index is 25.38 kg/m².    Intake/Output Summary (Last 24 hours) at 4/4/2022 1435  Last data filed at 4/4/2022 0600  Gross per 24 hour   Intake 360 ml   Output 4125 ml   Net -3765 ml        Physical Exam  Vitals and nursing note reviewed.   Constitutional:       Appearance: She is obese. She is ill-appearing.   HENT:      Head: Normocephalic and atraumatic.      Nose: Nose normal.      Mouth/Throat:      Mouth: Mucous membranes are moist.   Eyes:      Extraocular Movements: Extraocular movements intact.      Conjunctiva/sclera: Conjunctivae normal.      Pupils: Pupils are equal, round, and reactive to light.   Cardiovascular:      Rate and Rhythm: Regular rhythm. Bradycardia present.      Pulses: Normal pulses.      Heart sounds: Normal heart sounds. No murmur heard.  Pulmonary:      Effort: Pulmonary effort is normal.      Breath sounds: Rhonchi present. No wheezing.   Abdominal:      General: Bowel sounds are normal. There is no distension.      Palpations: Abdomen is soft.      Tenderness: There  is no abdominal tenderness. There is no guarding.   Musculoskeletal:         General: Swelling present.      Cervical back: Normal range of motion and neck supple.      Right lower leg: Edema present.      Left lower leg: Edema present.   Skin:     General: Skin is warm and dry.      Capillary Refill: Capillary refill takes 2 to 3 seconds.      Coloration: Skin is pale.      Findings: Bruising present. No erythema or rash.   Neurological:      Mental Status: She is alert and oriented to person, place, and time. Mental status is at baseline.   Psychiatric:         Mood and Affect: Mood normal.         Behavior: Behavior normal.         Thought Content: Thought content normal.         Judgment: Judgment normal.       Significant Labs: All pertinent labs within the past 24 hours have been reviewed.    Significant Imaging: I have reviewed all pertinent imaging results/findings within the past 24 hours.

## 2022-04-04 NOTE — ASSESSMENT & PLAN NOTE
- soft BP on admission but now uptrending  - resumed home coreg, losartan, hydralazine, and lasix; added amlodipine 5

## 2022-04-04 NOTE — PROGRESS NOTES
[facility] Medicine Progress Note    Primary Team: [facility]  Attending Physician: Danish Reyes, *    Subjective:   This morning on interview, Ms. Quinones was seen resting comfortably in bed enjoying her breakfast. She states that the intermittent tingling in her bilateral hands is unchanged but her generalized weakness has improved somewhat. She voiced no new complaints today.     Objective:     Last 24 Hour Vital Signs:  BP  Min: 135/62  Max: 209/76  Temp  Av.3 °F (37.4 °C)  Min: 97.8 °F (36.6 °C)  Max: 100.6 °F (38.1 °C)  Pulse  Av.9  Min: 77  Max: 103  Resp  Av.7  Min: 19  Max: 36  SpO2  Av.9 %  Min: 91 %  Max: 98 %  I/O last 3 completed shifts:  In: 2592.5 [P.O.:840; I.V.:220.6; IV Piggyback:1531.9]  Out: 5955 [Urine:5955]    Physical Examination:  Gen: WDWN female in NAD  Neuro: GCS 15, sensation strength intact throughout bilaterally. PERRL, EOMI.  CV: RRR, no murmurs, rubs, or gallops  Pulm: CTAB, no rhonchi, rales, or wheezes. On room air.  GI: Non-distended, non-tender, soft abdomen    Skin: Warm, dry  Extrem: Trace peripheral edema noted in bilateral ankles      Current Medications:     Infusions:   sodium chloride 0.9% Stopped (22 0401)        Scheduled:   albuterol-ipratropium  3 mL Nebulization Q4H WAKE    amLODIPine  5 mg Oral Daily    carvediloL  50 mg Oral BID WM    heparin (porcine)  5,000 Units Subcutaneous Q8H    hydrALAZINE  100 mg Oral BID    losartan  100 mg Oral Daily    mupirocin   Nasal BID    piperacillin-tazobactam (ZOSYN) IVPB  4.5 g Intravenous Q12H    pravastatin  40 mg Oral Daily    senna-docusate 8.6-50 mg  1 tablet Oral BID    sodium bicarbonate  1,300 mg Oral TID    sodium chloride 0.9%  10 mL Intravenous Q8H        PRN:  acetaminophen, aluminum-magnesium hydroxide-simethicone, dextrose 10%, dextrose 10%, glucagon (human recombinant), glucose, glucose, insulin aspart U-100, melatonin, ondansetron, ondansetron,  simethicone    Assessment:   Neuro:  Patient currently AAOx3 with no perceivable neurologic deficits.     Paresthesias:  Patient noted to have tingling sensation in bilateral hands.  - Most likely attributable to her hyponatremia of 117  - Will monitor for improvement with improving sodium    Generalized Subjective Weakness:  Also attributable to her  Hyponatremia  - Improved this morning     CV:  Patient currently hypertensive and not requiring pressors. Hemodynamically stable.     Combined Systolic and Diastolic Heart Failure:  Most recent TTE in February of 2022 shows an LVEF of 45-50% as well as grade II diastolic dysfunction  - Associated with ischemic cardiomyopathy s/p biventricular ICD placement  - Patient is euvolemic on exam  - Home Coreg, Norvasc, hydralazine, and losartan restarted by primary team  - Patient voices some confusion over her fluid restriction as she feels that her nephrologist and her cardiologist are unclear on this point. Will attempt to elucidate this point today and provide patient with more firm recommendations.     HTN:  Patient initially arrived mildly hypotensive, but has recently developed elevated BP pushing 180 systolic.  - Home Coreg, Norvasc, hydralazine, and losartan restarted by primary team     Pulm:  Patient currently on room air with excellent SpO2 values.     ABPA:  Patient recently started on voriconazole for ABPA per ID as outpatient.  - Do not believe voriconazole contributed to her hyponatremia  - Holding pending resolution of acute issues  - ID consulted, recommending steroids and Voriconazole. Discussed patient with primary pulmonologist today who voiced concern for MAC infection as well. Will leave medication regimen unaltered at this time.     Asthma:  Continue tiotropium, duonebs    GI:  Cardiac/diabetic diet     Hyperlipidemia:  Continue home pravastatin     Renal:  Patient with a creatinine of 2.6 on arrival, from a baseline of 2.3 approximately a month  ago     Hyponatremia:  Patient presenting with a sodium of 117 from a baseline of approximately 130  - Associated with mild tingling in hands as well as presenting complaint of weakness.  - Patient's BG not markedly elevated. Lipid panel showed no elevated TGs  - Patient appears euvolemic on exam  - Serum osm of 279, urine Na <20, urine osms 174  - Nephrology consulted-unclear cause of etiology as sodium levels and osmolarity not characteristic of ay particular etiology  - Last Na 129 this morning, from 120 sierra. 24h ago  - Holding Lasix today due to concerns for over-correction     Chronic Kidney Disease Stage 4:  Creatinine of 2.6 from baseline of 2.3  - Dose medications appropriately  - Avoid nephrotoxic agents  - De La Cruz in place  - Nephrology following-patient to start sodium bicarb 1300 mg TID     Urinary Retention:  Patient currently with De La Cruz catheter in place     Heme/ID:     Sepsis:  Patient initially presented hypotensive and hypothermic concerning for infection  - Urine culture with GNR, speciation pending  - Currently on Zosyn  - Follow-up BCx and narrow abx as able     Iron Deficiency Anemia:  Hemoglobin of 8.5 on presentation from a baseline of 9-10  - Continue to monitor with daily CBC     Endocrine:     Diabetes Mellitus Type 2:  Last A1c of 5.9 in February of this year  - POC Glu checks AC and at night  - Low dose SSI  - Diabetic diet     Psych:  No acute issues     Prophylaxis:  On SQH  No GI Ppx needed    Willis Cannon MD  [facility]

## 2022-04-04 NOTE — ASSESSMENT & PLAN NOTE
This patient does have evidence of infective focus  My overall impression is sepsis. Vital signs were reviewed and noted in progress note.  Antibiotics given-   Antibiotics (From admission, onward)            Start     Stop Route Frequency Ordered    04/04/22 1601  piperacillin-tazobactam 4.5 g in dextrose 5 % 100 mL IVPB (ready to mix system)  (Sepsis Workup)         -- IV Every 12 hours (non-standard times) 04/04/22 0751    04/02/22 2130  mupirocin 2 % ointment         04/07 2059 Nasl 2 times daily 04/02/22 2018        Cultures were taken-   Microbiology Results (last 7 days)     Procedure Component Value Units Date/Time    Blood culture x two cultures. Draw prior to antibiotics. [[id number]] Collected: 04/02/22 1851    Order Status: Completed Specimen: Blood from Peripheral, Antecubital, Left Updated: 04/04/22 0613     Blood Culture, Routine No Growth to date      No Growth to date    Narrative:      Aerobic and anaerobic    Blood culture x two cultures. Draw prior to antibiotics. [[id number]] Collected: 04/02/22 1854    Order Status: Completed Specimen: Blood from Peripheral, Wrist, Left Updated: 04/04/22 0613     Blood Culture, Routine No Growth to date      No Growth to date    Narrative:      Aerobic and anaerobic    Blood culture [[id number]] Collected: 04/02/22 2105    Order Status: Completed Specimen: Blood Updated: 04/04/22 0613     Blood Culture, Routine No Growth to date      No Growth to date    Urine culture [[id number]]  (Abnormal) Collected: 04/02/22 2108    Order Status: Completed Specimen: Urine Updated: 04/04/22 0128     Urine Culture, Routine GRAM NEGATIVE YA  > 100,000 cfu/ml  Identification and susceptibility pending      Narrative:      Specimen Source->Urine    Blood culture [[id number]]     Order Status: Canceled Specimen: Blood         Latest lactate reviewed, they are-  Recent Labs   Lab 04/02/22  1847   LACTATE 0.7       Concerned for sepsis 2/2 hypothermia, tachypnea, and hypotension while  in the ED  Source- pyelo     Source control Achieved by- IV Vancomycin and IV Zosyn. She was also given IVFs in the ED.  - de-escalate to zosyn for now, likely further de-escalation tomorrow pending culture

## 2022-04-04 NOTE — PLAN OF CARE
VN note: pt transferred from icu. progress notes, labs and vital signs reviewed. Will be available to intervene if needed.

## 2022-04-04 NOTE — PLAN OF CARE
Problem: Physical Therapy  Goal: Physical Therapy Goal  Description: Goals to be met by:  2022     Patient will increase functional independence with mobility by performin. Supine to sit with Modified [place]  2. Sit to supine with Modified [place]  3. Sit to stand transfer with Modified [place]  4. Gait  x 150 feet with Supervision using Rolling Walker.   5.  Tolerate OOB x 2 hours BID    Outcome: Ongoing, Progressing       Patient seen for physical therapy evaluation.  Report to follow.  Anticipate patient will benefit from Home Health PT/OT after discharge.  No DME needs.

## 2022-04-04 NOTE — PROGRESS NOTES
Late entry:    Under sterile technique, 16 Fr catheter inserted into urinary bladder with 740 ml of yellow, cloudy urine output.  Patient tolerated well.  Patient teaching provided. She voiced understanding.   Follow up scheduled.

## 2022-04-04 NOTE — PROGRESS NOTES
Nephrology Progress Note       Consult Requested By: Danish Reyes, *  Reason for Consult: VIRGILIO     SUBJECTIVE:    ?    Review of Systems   Constitutional: Negative for chills and fever.   HENT: Negative for congestion and sore throat.    Eyes: Negative for blurred vision, double vision and photophobia.   Respiratory: Negative for cough and shortness of breath.    Cardiovascular: Negative for chest pain, palpitations and leg swelling.   Gastrointestinal: Negative for abdominal pain, diarrhea, nausea and vomiting.   Genitourinary: Negative for dysuria and urgency.   Musculoskeletal: Negative for joint pain and myalgias.   Skin: Negative for itching and rash.   Neurological: Positive for weakness (chronic ). Negative for dizziness, sensory change and headaches.   Endo/Heme/Allergies: Negative for polydipsia. Does not bruise/bleed easily.   Psychiatric/Behavioral: Negative for depression.       Past Medical History:   Diagnosis Date    Acute hypoxemic respiratory failure 12/19/2019    Acute right-sided thoracic back pain 12/09/2019    Allergy     Asthma     Basal cell carcinoma     left forehead    Basal cell carcinoma     left nose    Basal cell carcinoma 05/20/2015    right nose    Basal cell carcinoma 12/22/2015    left lower post neck    Basal cell carcinoma 12/03/2019    left ant scalp     Breast cancer     CAD (coronary artery disease)     Cardiomyopathy     Cardiomyopathy, ischemic     Cataract     CHF (congestive heart failure)     Chronic kidney disease, stage 3, mod decreased GFR 02/14/2017    Colon polyp 2011    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, with long-term current use of insulin 02/22/2018    COPD (chronic obstructive pulmonary disease)     COPD exacerbation 04/08/2018    Current mild episode of major depressive disorder without prior episode 01/25/2022    Defibrillator discharge     Diabetes  mellitus     Diabetes mellitus type II     Diabetes with neurologic complications     Goiter     MNG    HX: breast cancer     Hyperlipidemia     Hypertension     Iron deficiency anemia 05/16/2017    Left kidney mass     Meningioma     Microalbuminuria due to type 2 diabetes mellitus 01/26/2022    Osteoporosis, postmenopausal     Pneumonia 12/08/2019    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    Proteinuria 1/21/2019    Pseudomonas pneumonia     Skin cancer     s/p excision    Sleep apnea     CPAP    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/2015    mid forehead    Unspecified vitamin D deficiency     UTI (urinary tract infection) 4/2/2022    Ventricular tachycardia     Vitamin B12 deficiency     Vitamin D deficiency disease          OBJECTIVE:     Vital Signs (Most Recent)  Vitals:    04/04/22 0845 04/04/22 0900 04/04/22 0917 04/04/22 0935   BP:  (!) 157/69 (!) 157/69    Pulse: 91 87  90   Resp: (!) 22 (!) 22  20   Temp:       TempSrc:       SpO2: (!) 94% 96%  97%   Weight:       Height:                     Medications:   albuterol-ipratropium  3 mL Nebulization Q4H WAKE    amLODIPine  5 mg Oral Daily    carvediloL  50 mg Oral BID WM    furosemide  40 mg Oral Daily    heparin (porcine)  5,000 Units Subcutaneous Q8H    hydrALAZINE  100 mg Oral BID    losartan  100 mg Oral Daily    mupirocin   Nasal BID    piperacillin-tazobactam (ZOSYN) IVPB  4.5 g Intravenous Q12H    pravastatin  40 mg Oral Daily    senna-docusate 8.6-50 mg  1 tablet Oral BID    sodium bicarbonate  1,300 mg Oral TID    sodium chloride 0.9%  10 mL Intravenous Q8H           Physical Exam  Vitals and nursing note reviewed.   Constitutional:       General: She is not in acute distress.     Appearance: She is ill-appearing. She is not diaphoretic.   HENT:      Head: Normocephalic and atraumatic.      Mouth/Throat:      Pharynx: No oropharyngeal exudate.   Eyes:      General: No scleral icterus.     Conjunctiva/sclera:  Conjunctivae normal.      Pupils: Pupils are equal, round, and reactive to light.   Cardiovascular:      Rate and Rhythm: Normal rate and regular rhythm.      Heart sounds: Normal heart sounds. No murmur heard.  Pulmonary:      Effort: Pulmonary effort is normal. No respiratory distress.      Breath sounds: Rales (RLL) present.   Abdominal:      General: Bowel sounds are normal. There is no distension.      Palpations: Abdomen is soft.      Tenderness: There is no abdominal tenderness.   Musculoskeletal:         General: Swelling present. Normal range of motion.      Cervical back: Normal range of motion and neck supple.      Right lower leg: Edema present.      Left lower leg: Edema present.   Skin:     General: Skin is warm and dry.      Findings: No erythema.   Neurological:      Mental Status: She is alert and oriented to person, place, and time.      Cranial Nerves: No cranial nerve deficit.   Psychiatric:         Mood and Affect: Affect normal.         Cognition and Memory: Memory normal.         Judgment: Judgment normal.         Laboratory:  Recent Labs   Lab 04/02/22  1849 04/03/22  0334 04/03/22  0338 04/04/22  0301   WBC 9.83 8.15  --  11.27   HGB 8.5* 7.9*  --  9.0*   HCT 27.2* 23.3* 40 26.8*    208  --  254   MONO 7.1  0.7 7.6  0.6  --  7.2  0.8     Recent Labs   Lab 04/03/22  1048 04/03/22  1945 04/04/22  0301   * 124* 129*   K 5.0 4.8 4.5   CL 93* 96 99   CO2 15* 19* 20*   BUN 70* 63* 59*   CREATININE 2.6* 2.7* 2.5*   CALCIUM 8.3* 8.4* 8.7   PHOS 4.8* 4.8* 5.0*       Diagnostic Results:  X-Ray: Reviewed  US: Reviewed  Echo: Reviewed  ASSESSMENT/PLAN:     1. VIRGILIO - on CKD4?5 follows at [facility] though due to DM/HTN has proteinuria nephrotic range.    -- Cr ?  baseline 2.6 had multiple admission and VIRGILIO - stable 2.6  -- Please avoid nephrotoxins, including NSAIDs, aminoglycosides, IV contrast (unless absolutely necessary), gadolinium, fleets and other phosphorous-based laxatives. Caution with  antibiotics.  -- Daily Renal Function Panel  -- Avoid Hypotension.  -- Renally dose all meds  -- Avoid Vanc Zosyn Combo unless absolutely  indicated   Hyponatremia  - most likely pseudo Serum osm - wnr x2  Not clear etiology at this point -? multifactorial,   Sepsis,   -- However Serum osm - wnr doesn't fit, has no symptoms as of now   -- add NaBicarb will help with acidosis and Na+ - so far improving          2. HTN (I10) - elevated but in concern for Sepsis monitor for now   -- resume lasix first coreg and ARB   3. Anemia of chronic kidney disease   -- check iron levels   Recent Labs   Lab 04/02/22  1849 04/03/22  0334 04/03/22  0338 04/04/22  0301   HGB 8.5* 7.9*  --  9.0*   HCT 27.2* 23.3* 40 26.8*    208  --  254       Iron   Lab Results   Component Value Date    IRON 41 10/21/2020    TIBC 425 10/21/2020    FERRITIN 95 12/12/2020       4. MBD (E88.9 M90.80) -  Recent Labs   Lab 04/04/22  0301   CALCIUM 8.7   PHOS 5.0*     Recent Labs   Lab 04/02/22  1849 04/03/22  0334 04/04/22  0301   MG 2.2 2.0 2.0     Acidosis - improving   Lab Results   Component Value Date    PTH 72.0 03/26/2019    CALCIUM 8.7 04/04/2022    PHOS 5.0 (H) 04/04/2022     Lab Results   Component Value Date    DONAJZHB34UA 31 01/25/2022       Lab Results   Component Value Date    CO2 20 (L) 04/04/2022   -- PO bicarbonate     5. Nutrition/Hypoalbuminemia (E88.09) -    Recent Labs   Lab 04/03/22  1048 04/03/22  1945   ALBUMIN 2.8* 2.5*     Nepro with meals TID. Renal vitamins daily    Total critical care time 45 minutes: included management of organ failures related to critical illness, titration of continuous infusions, review of pertinent labs and imaging studies, discussion with primary team and family.    Thank you for the consult, will follow  With any question please call [phone]  Anna Fairbanks MD    [facility]  YONG Austin MD, FACPESTRELLA MD,   MD DAYANNA Isabel MD E. V. Harmon, NP    [id number]  [place], [id number]  [phone]

## 2022-04-05 ENCOUNTER — PATIENT MESSAGE (OUTPATIENT)
Dept: ENDOCRINOLOGY | Facility: CLINIC | Age: 82
End: 2022-04-05
Payer: MEDICARE

## 2022-04-05 LAB
ANION GAP SERPL CALC-SCNC: 13 MMOL/L (ref 8–16)
BACTERIA UR CULT: ABNORMAL
BASOPHILS # BLD AUTO: 0.08 K/UL (ref 0–0.2)
BASOPHILS NFR BLD: 0.7 % (ref 0–1.9)
BUN SERPL-MCNC: 53 MG/DL (ref 8–23)
CALCIUM SERPL-MCNC: 8.4 MG/DL (ref 8.7–10.5)
CHLORIDE SERPL-SCNC: 100 MMOL/L (ref 95–110)
CO2 SERPL-SCNC: 21 MMOL/L (ref 23–29)
CREAT SERPL-MCNC: 2.4 MG/DL (ref 0.5–1.4)
DIFFERENTIAL METHOD: ABNORMAL
EOSINOPHIL # BLD AUTO: 0.1 K/UL (ref 0–0.5)
EOSINOPHIL NFR BLD: 0.4 % (ref 0–8)
ERYTHROCYTE [DISTWIDTH] IN BLOOD BY AUTOMATED COUNT: 13.5 % (ref 11.5–14.5)
EST. GFR  (AFRICAN AMERICAN): 21 ML/MIN/1.73 M^2
EST. GFR  (NON AFRICAN AMERICAN): 18 ML/MIN/1.73 M^2
GLUCOSE SERPL-MCNC: 162 MG/DL (ref 70–110)
HCT VFR BLD AUTO: 26.8 % (ref 37–48.5)
HGB BLD-MCNC: 8.6 G/DL (ref 12–16)
IMM GRANULOCYTES # BLD AUTO: 0.19 K/UL (ref 0–0.04)
IMM GRANULOCYTES NFR BLD AUTO: 1.6 % (ref 0–0.5)
LYMPHOCYTES # BLD AUTO: 1.2 K/UL (ref 1–4.8)
LYMPHOCYTES NFR BLD: 10.6 % (ref 18–48)
MAGNESIUM SERPL-MCNC: 1.7 MG/DL (ref 1.6–2.6)
MCH RBC QN AUTO: 28.8 PG (ref 27–31)
MCHC RBC AUTO-ENTMCNC: 32.1 G/DL (ref 32–36)
MCV RBC AUTO: 90 FL (ref 82–98)
MONOCYTES # BLD AUTO: 0.9 K/UL (ref 0.3–1)
MONOCYTES NFR BLD: 7.8 % (ref 4–15)
NEUTROPHILS # BLD AUTO: 9.2 K/UL (ref 1.8–7.7)
NEUTROPHILS NFR BLD: 78.9 % (ref 38–73)
NRBC BLD-RTO: 0 /100 WBC
PATH REV BLD -IMP: NORMAL
PHOSPHATE SERPL-MCNC: 4.6 MG/DL (ref 2.7–4.5)
PLATELET # BLD AUTO: 268 K/UL (ref 150–450)
PMV BLD AUTO: 10 FL (ref 9.2–12.9)
POCT GLUCOSE: 175 MG/DL (ref 70–110)
POCT GLUCOSE: 264 MG/DL (ref 70–110)
POCT GLUCOSE: 266 MG/DL (ref 70–110)
POTASSIUM SERPL-SCNC: 3.8 MMOL/L (ref 3.5–5.1)
RBC # BLD AUTO: 2.99 M/UL (ref 4–5.4)
SODIUM SERPL-SCNC: 134 MMOL/L (ref 136–145)
WBC # BLD AUTO: 11.64 K/UL (ref 3.9–12.7)

## 2022-04-05 PROCEDURE — 97110 THERAPEUTIC EXERCISES: CPT | Mod: CQ

## 2022-04-05 PROCEDURE — 84100 ASSAY OF PHOSPHORUS: CPT | Performed by: HOSPITALIST

## 2022-04-05 PROCEDURE — 87206 SMEAR FLUORESCENT/ACID STAI: CPT | Performed by: STUDENT IN AN ORGANIZED HEALTH CARE EDUCATION/TRAINING PROGRAM

## 2022-04-05 PROCEDURE — 99900035 HC TECH TIME PER 15 MIN (STAT)

## 2022-04-05 PROCEDURE — 94640 AIRWAY INHALATION TREATMENT: CPT

## 2022-04-05 PROCEDURE — A4216 STERILE WATER/SALINE, 10 ML: HCPCS | Performed by: NURSE PRACTITIONER

## 2022-04-05 PROCEDURE — 63600175 PHARM REV CODE 636 W HCPCS: Performed by: NURSE PRACTITIONER

## 2022-04-05 PROCEDURE — 94761 N-INVAS EAR/PLS OXIMETRY MLT: CPT

## 2022-04-05 PROCEDURE — 97530 THERAPEUTIC ACTIVITIES: CPT

## 2022-04-05 PROCEDURE — 25000003 PHARM REV CODE 250: Performed by: HOSPITALIST

## 2022-04-05 PROCEDURE — 83735 ASSAY OF MAGNESIUM: CPT | Performed by: HOSPITALIST

## 2022-04-05 PROCEDURE — 87015 SPECIMEN INFECT AGNT CONCNTJ: CPT | Performed by: STUDENT IN AN ORGANIZED HEALTH CARE EDUCATION/TRAINING PROGRAM

## 2022-04-05 PROCEDURE — 25000242 PHARM REV CODE 250 ALT 637 W/ HCPCS: Performed by: STUDENT IN AN ORGANIZED HEALTH CARE EDUCATION/TRAINING PROGRAM

## 2022-04-05 PROCEDURE — 97116 GAIT TRAINING THERAPY: CPT | Mod: CQ

## 2022-04-05 PROCEDURE — 25000003 PHARM REV CODE 250: Performed by: INTERNAL MEDICINE

## 2022-04-05 PROCEDURE — 21400001 HC TELEMETRY ROOM

## 2022-04-05 PROCEDURE — 36415 COLL VENOUS BLD VENIPUNCTURE: CPT | Performed by: HOSPITALIST

## 2022-04-05 PROCEDURE — 63600175 PHARM REV CODE 636 W HCPCS: Performed by: HOSPITALIST

## 2022-04-05 PROCEDURE — 25000003 PHARM REV CODE 250: Performed by: STUDENT IN AN ORGANIZED HEALTH CARE EDUCATION/TRAINING PROGRAM

## 2022-04-05 PROCEDURE — 97535 SELF CARE MNGMENT TRAINING: CPT

## 2022-04-05 PROCEDURE — 94660 CPAP INITIATION&MGMT: CPT

## 2022-04-05 PROCEDURE — 80048 BASIC METABOLIC PNL TOTAL CA: CPT | Performed by: HOSPITALIST

## 2022-04-05 PROCEDURE — 85025 COMPLETE CBC W/AUTO DIFF WBC: CPT | Performed by: HOSPITALIST

## 2022-04-05 PROCEDURE — 87116 MYCOBACTERIA CULTURE: CPT | Performed by: STUDENT IN AN ORGANIZED HEALTH CARE EDUCATION/TRAINING PROGRAM

## 2022-04-05 PROCEDURE — 25000003 PHARM REV CODE 250: Performed by: NURSE PRACTITIONER

## 2022-04-05 RX ORDER — VORICONAZOLE 200 MG/1
200 TABLET, FILM COATED ORAL 2 TIMES DAILY
Status: DISCONTINUED | OUTPATIENT
Start: 2022-04-05 | End: 2022-04-05

## 2022-04-05 RX ORDER — HYDRALAZINE HYDROCHLORIDE 20 MG/ML
10 INJECTION INTRAMUSCULAR; INTRAVENOUS EVERY 8 HOURS PRN
Status: DISCONTINUED | OUTPATIENT
Start: 2022-04-05 | End: 2022-04-06 | Stop reason: HOSPADM

## 2022-04-05 RX ORDER — CIPROFLOXACIN 500 MG/1
500 TABLET ORAL EVERY 12 HOURS
Status: DISCONTINUED | OUTPATIENT
Start: 2022-04-05 | End: 2022-04-06

## 2022-04-05 RX ORDER — CIPROFLOXACIN 500 MG/1
500 TABLET ORAL EVERY 12 HOURS
Qty: 12 TABLET | Refills: 0 | Status: SHIPPED | OUTPATIENT
Start: 2022-04-05 | End: 2022-04-11

## 2022-04-05 RX ORDER — HYDRALAZINE HYDROCHLORIDE 25 MG/1
100 TABLET, FILM COATED ORAL EVERY 8 HOURS
Status: DISCONTINUED | OUTPATIENT
Start: 2022-04-05 | End: 2022-04-06 | Stop reason: HOSPADM

## 2022-04-05 RX ORDER — FLUTICASONE FUROATE AND VILANTEROL 100; 25 UG/1; UG/1
1 POWDER RESPIRATORY (INHALATION) DAILY
Status: DISCONTINUED | OUTPATIENT
Start: 2022-04-06 | End: 2022-04-06 | Stop reason: HOSPADM

## 2022-04-05 RX ORDER — SODIUM BICARBONATE 650 MG/1
1300 TABLET ORAL 3 TIMES DAILY
Qty: 180 TABLET | Refills: 0 | Status: SHIPPED | OUTPATIENT
Start: 2022-04-05 | End: 2022-04-26 | Stop reason: SDUPTHER

## 2022-04-05 RX ADMIN — LOSARTAN POTASSIUM 100 MG: 50 TABLET, FILM COATED ORAL at 08:04

## 2022-04-05 RX ADMIN — INSULIN ASPART 3 UNITS: 100 INJECTION, SOLUTION INTRAVENOUS; SUBCUTANEOUS at 12:04

## 2022-04-05 RX ADMIN — IPRATROPIUM BROMIDE AND ALBUTEROL SULFATE 3 ML: .5; 3 SOLUTION RESPIRATORY (INHALATION) at 07:04

## 2022-04-05 RX ADMIN — HYDRALAZINE HYDROCHLORIDE 100 MG: 25 TABLET, FILM COATED ORAL at 08:04

## 2022-04-05 RX ADMIN — SODIUM CHLORIDE: 0.9 INJECTION, SOLUTION INTRAVENOUS at 05:04

## 2022-04-05 RX ADMIN — IPRATROPIUM BROMIDE AND ALBUTEROL SULFATE 3 ML: .5; 3 SOLUTION RESPIRATORY (INHALATION) at 04:04

## 2022-04-05 RX ADMIN — HYDRALAZINE HYDROCHLORIDE 100 MG: 25 TABLET, FILM COATED ORAL at 09:04

## 2022-04-05 RX ADMIN — SODIUM BICARBONATE 1300 MG: 650 TABLET ORAL at 02:04

## 2022-04-05 RX ADMIN — HYDRALAZINE HYDROCHLORIDE 10 MG: 20 INJECTION, SOLUTION INTRAMUSCULAR; INTRAVENOUS at 12:04

## 2022-04-05 RX ADMIN — AMLODIPINE BESYLATE 5 MG: 5 TABLET ORAL at 08:04

## 2022-04-05 RX ADMIN — HEPARIN SODIUM 5000 UNITS: 5000 INJECTION INTRAVENOUS; SUBCUTANEOUS at 09:04

## 2022-04-05 RX ADMIN — HEPARIN SODIUM 5000 UNITS: 5000 INJECTION INTRAVENOUS; SUBCUTANEOUS at 02:04

## 2022-04-05 RX ADMIN — HEPARIN SODIUM 5000 UNITS: 5000 INJECTION INTRAVENOUS; SUBCUTANEOUS at 05:04

## 2022-04-05 RX ADMIN — PRAVASTATIN SODIUM 40 MG: 40 TABLET ORAL at 08:04

## 2022-04-05 RX ADMIN — INSULIN ASPART 3 UNITS: 100 INJECTION, SOLUTION INTRAVENOUS; SUBCUTANEOUS at 05:04

## 2022-04-05 RX ADMIN — CARVEDILOL 50 MG: 25 TABLET, FILM COATED ORAL at 08:04

## 2022-04-05 RX ADMIN — CIPROFLOXACIN 500 MG: 500 TABLET, FILM COATED ORAL at 10:04

## 2022-04-05 RX ADMIN — Medication 10 ML: at 02:04

## 2022-04-05 RX ADMIN — PIPERACILLIN AND TAZOBACTAM 4.5 G: 4; .5 INJECTION, POWDER, LYOPHILIZED, FOR SOLUTION INTRAVENOUS; PARENTERAL at 05:04

## 2022-04-05 RX ADMIN — CIPROFLOXACIN 500 MG: 500 TABLET, FILM COATED ORAL at 09:04

## 2022-04-05 RX ADMIN — IPRATROPIUM BROMIDE AND ALBUTEROL SULFATE 3 ML: .5; 3 SOLUTION RESPIRATORY (INHALATION) at 11:04

## 2022-04-05 RX ADMIN — DOCUSATE SODIUM AND SENNOSIDES 1 TABLET: 8.6; 5 TABLET, FILM COATED ORAL at 08:04

## 2022-04-05 RX ADMIN — DOCUSATE SODIUM AND SENNOSIDES 1 TABLET: 8.6; 5 TABLET, FILM COATED ORAL at 09:04

## 2022-04-05 RX ADMIN — SODIUM BICARBONATE 1300 MG: 650 TABLET ORAL at 08:04

## 2022-04-05 RX ADMIN — SODIUM BICARBONATE 1300 MG: 650 TABLET ORAL at 10:04

## 2022-04-05 RX ADMIN — MUPIROCIN: 20 OINTMENT TOPICAL at 09:04

## 2022-04-05 RX ADMIN — CARVEDILOL 50 MG: 25 TABLET, FILM COATED ORAL at 04:04

## 2022-04-05 RX ADMIN — MUPIROCIN: 20 OINTMENT TOPICAL at 08:04

## 2022-04-05 RX ADMIN — HYDRALAZINE HYDROCHLORIDE 100 MG: 25 TABLET, FILM COATED ORAL at 02:04

## 2022-04-05 RX ADMIN — INSULIN ASPART 1 UNITS: 100 INJECTION, SOLUTION INTRAVENOUS; SUBCUTANEOUS at 09:04

## 2022-04-05 NOTE — PLAN OF CARE
Pts safety maintained, bed alarm on, call bell in reach, family at bedside. Meds given per MAR. No N/V, SOB, distress or complains of pain during night. BP elevated, IV hydralazine given.

## 2022-04-05 NOTE — PLAN OF CARE
"Pt progressing well towards goals, agreeable to therapy this date. No c/o pain, pt reports feeling "weak". Pt found sitting up in chair, /73, HR 98, O2 92%. Pt performed G&H with Kourtney sitting up in chair. Pt performed sit<>stand with CGA and use of RW. Pt performed functional mobility in room with CGA and use of RW. After functional mobility /74, , O2 89%. Pt given increased v/cs for PLB, O2 recovering to 92%. Pt educated on pacing strategies & BLE elevated at end of session. Pt & daughter educated to call staff for all assistance. Per chart pt is to d/c home this date with  OT/PT, pt would benefit from family care/assistance upon d/c due to increased weakness/decreased endurance.     Problem: Occupational Therapy  Goal: Occupational Therapy Goal  Description: Goals to be met by: 5/4/2022     Patient will increase functional independence with ADLs by performing:    UE Dressing with Modified [place].  LE Dressing with Modified [place].  Grooming while standing with Stand-by Assistance.  Toileting from toilet with Modified [place] for hygiene and clothing management.   Step transfer with Modified [place] & appropriate AD.  Toilet transfer to toilet with Modified [place] & appropriate AD.     Outcome: Ongoing, Progressing     "

## 2022-04-05 NOTE — PLAN OF CARE
Problem: Physical Therapy  Goal: Physical Therapy Goal  Description: Goals to be met by:  2022     Patient will increase functional independence with mobility by performin. Supine to sit with Modified [place]  2. Sit to supine with Modified [place]  3. Sit to stand transfer with Modified [place]  4. Gait  x 150 feet with Supervision using Rolling Walker.   5.  Tolerate OOB x 2 hours BID    Outcome: Ongoing, Progressing

## 2022-04-05 NOTE — PLAN OF CARE
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  Lauren placed 2/28/22.   --recurrent retention, lauren replaced 4/1/22      Metabolic acidosis [E87.2]  Yes    Current mild episode of major depressive disorder without prior episode [F32.0]  Yes    Chronic bronchitis, unspecified chronic bronchitis type [J42]  Yes    Obstructive sleep apnea [G47.33]  Yes    Chronic combined systolic and diastolic heart failure [I50.42]  Yes    Moderate asthma [J45.909]  Yes    Hyperlipidemia associated with type 2 diabetes mellitus [E11.69, E78.5]  Yes    CKD (chronic kidney disease) stage 4, GFR 15-29 ml/min [N18.4]  Yes    Cardiomyopathy, ischemic [I25.5]  Yes    Weakness [R53.1]  Yes    Chronic obstructive pulmonary disease with acute exacerbation [J44.1]  Yes    Type 2 diabetes mellitus with hyperglycemia, with long-term current use of insulin [E11.65, Z79.4]  Not Applicable    Iron deficiency anemia [D50.9]  Yes    Hypertension associated with diabetes [E11.59, I15.2]  Yes    Biventricular ICD (implantable cardioverter-defibrillator) in place [Z95.810]  Yes      Resolved Hospital Problems    Diagnosis Date Resolved POA    Hypothermia [T68.XXXA] 04/04/2022 Yes       Follow Up Appointments:  Future Appointments   Date Time Provider Department Center   4/7/2022 10:30 AM LAB, JANES KENH LAB [place]   4/7/2022 10:45 AM  SPECIMEN, [facility] SPECLAB [place]   4/8/2022 10:05 AM LAB, JANES LARA LAB [place]   4/11/2022  8:30 AM LAB, TORRI STEVENS [facility] LAB V[facility]   4/11/2022  9:00 AM Li Lott PA-C [facility] hospital   4/11/2022  9:45 AM [facility]   4/12/2022 11:00 AM Kelvin Maki MD [facility] PULMSVC [facility]   4/14/2022 10:30 AM Mary Jane Mccoy MD [facility] NEPHRO [facility]   4/19/2022  9:30 AM Steffen Osborn MD [facility] UROLOGY [facility]   4/26/2022 10:30 AM Catrachita Rothman MD [facility] IM [facility] PC   5/8/2022 10:00 AM HOME MONITOR DEVICE CHECK, [facility]   5/10/2022 11:00 AM Catia Amaya MD [facility] ID [facility]   5/11/2022  8:40 AM COORDINATED DEVICE CHECK [facility]   5/11/2022  9:15 AM EKG, APPT [facility] EKG [facility]   5/11/2022  9:40 AM Jan Mckeon MD [facility] ARRHYTH [facility]   6/6/2022  9:00 AM Elidia Madison NP [facility]       Allergies:  Review of patient's allergies indicates:   Allergen Reactions    Iodine and iodide containing products Hives    Nifedipine      weakness       Medications: Review discharge medications with patient and family and provide education.    Current Facility-Administered Medications   Medication Dose Route Frequency Provider Last Rate Last Admin    0.9%  NaCl infusion   Intravenous Continuous Danish Reyes MD   Stopped at 04/05/22 0553    acetaminophen tablet 650 mg  650 mg Oral Q4H PRN Brayan Frye NP   650 mg at 04/04/22 1703    albuterol sulfate nebulizer solution 2.5 mg  2.5 mg Nebulization PRN Madelyn Pratt MD        albuterol-ipratropium 2.5 mg-0.5 mg/3 mL nebulizer solution 3 mL  3 mL Nebulization Q4H TAMMY Lopez MD   3 mL at 04/05/22 1131    albuterol-ipratropium 2.5 mg-0.5 mg/3 mL nebulizer solution 3 mL  3 mL Nebulization Q4H PRN Danish Reyes MD        aluminum-magnesium hydroxide-simethicone 200-200-20 mg/5 mL suspension 30 mL   30 mL Oral QID PRN Brayan Frye NP        amLODIPine tablet 5 mg  5 mg Oral Daily Danish Reyes MD   5 mg at 04/05/22 0840    carvediloL tablet 50 mg  50 mg Oral BID WM Danish Reyes MD   50 mg at 04/05/22 0840    ciprofloxacin HCl tablet 500 mg  500 mg Oral Q12H Yrn Sheppard MD   500 mg at 04/05/22 1037    dextrose 10% bolus 125 mL  12.5 g Intravenous PRN Caroline Anderson MD        dextrose 10% bolus 250 mL  25 g Intravenous PRN Caroline Anderson MD   Stopped at 04/02/22 2316    glucagon (human recombinant) injection 1 mg  1 mg Intramuscular PRN Brayan Frye NP        glucose chewable tablet 16 g  16 g Oral PRN Brayan Frye NP        glucose chewable tablet 24 g  24 g Oral PRN Brayan Frye NP        heparin (porcine) injection 5,000 Units  5,000 Units Subcutaneous Q8H Brayan Frye NP   5,000 Units at 04/05/22 0553    hydrALAZINE injection 10 mg  10 mg Intravenous Q8H PRN Celeste Frazier NP   10 mg at 04/05/22 0052    hydrALAZINE tablet 100 mg  100 mg Oral Q8H Yrn Sheppard MD        insulin aspart U-100 pen 0-5 Units  0-5 Units Subcutaneous QID (AC + HS) PRN Brayan Frye NP   1 Units at 04/04/22 2218    losartan tablet 100 mg  100 mg Oral Daily Danish Reyes MD   100 mg at 04/05/22 0841    melatonin tablet 6 mg  6 mg Oral Nightly PRN Brayan Frye NP   6 mg at 04/02/22 2324    mupirocin 2 % ointment   Nasal BID Caroline Anderson MD   Given at 04/05/22 0841    ondansetron disintegrating tablet 8 mg  8 mg Oral Q8H PRN Brayan Frye NP        ondansetron injection 4 mg  4 mg Intravenous Q8H PRN Brayan Frye NP        pravastatin tablet 40 mg  40 mg Oral Daily Brayan Frye NP   40 mg at 04/05/22 0841    senna-docusate 8.6-50 mg per tablet 1 tablet  1 tablet Oral BID Brayan Frye NP   1 tablet at 04/05/22 0841     simethicone chewable tablet 80 mg  1 tablet Oral QID PRN Brayan Frye NP        sodium bicarbonate tablet 1,300 mg  1,300 mg Oral TID Anna Fairbanks MD   1,300 mg at 04/05/22 0841    sodium chloride 0.9% flush 10 mL  10 mL Intravenous Q8H Brayan Frye NP   10 mL at 04/04/22 1502    sodium chloride 3% nebulizer solution 4 mL  4 mL Nebulization PRN Madelyn Pratt MD        voriconazole tablet 200 mg  200 mg Oral BID Yrn Sheppard MD         Current Discharge Medication List      START taking these medications    Details   ciprofloxacin HCl (CIPRO) 500 MG tablet Take 1 tablet (500 mg total) by mouth every 12 (twelve) hours. for 6 days  Qty: 12 tablet, Refills: 0      sodium bicarbonate 650 MG tablet Take 2 tablets (1,300 mg total) by mouth 3 (three) times daily.  Qty: 180 tablet, Refills: 0         CONTINUE these medications which have NOT CHANGED    Details   albuterol (PROVENTIL/VENTOLIN HFA) 90 mcg/actuation inhaler INHALE 2 PUFFS INTO THE LUNGS EVERY 6 (SIX) HOURS AS NEEDED FOR WHEEZING. RESCUE  Qty: 18 g, Refills: 12    Associated Diagnoses: Cough      albuterol-ipratropium (DUO-NEB) 2.5 mg-0.5 mg/3 mL nebulizer solution TAKE 3 MLS BY NEBULIZATION EVERY 4 (FOUR) HOURS AS NEEDED FOR WHEEZING.  Qty: 270 mL, Refills: 12      ascorbic acid, vitamin C, (VITAMIN C) 100 MG tablet Take 100 mg by mouth once daily.      benzonatate (TESSALON) 100 MG capsule Take 1 capsule (100 mg total) by mouth 3 (three) times daily as needed for Cough.  Qty: 20 capsule, Refills: 0      blood sugar diagnostic (ACCU-CHEK ANGELICA) Strp Uses Accu-Check Angelica meter to test BG 4x/day  Qty: 400 strip, Refills: 6    Associated Diagnoses: Diabetic polyneuropathy associated with type 2 diabetes mellitus      carvediloL (COREG) 25 MG tablet TAKE 2 TABLETS (50 MG TOTAL) BY MOUTH 2 (TWO) TIMES DAILY.  Qty: 360 tablet, Refills: 3    Comments: .      cholecalciferol, vitamin D3, (VITAMIN D3) 50 mcg (2,000  unit) Tab Take 1 tablet by mouth once daily.       cloNIDine 0.1 mg/24 hr td ptwk (CATAPRES) 0.1 mg/24 hr Place 1 patch onto the skin every Tuesday.  Qty: 12 patch, Refills: 11    Comments: DX Code Needed  .  Associated Diagnoses: Essential hypertension      CYANOCOBALAMIN, VITAMIN B-12, (B-12 DOTS ORAL) Take 1 tablet by mouth once daily.      diltiaZEM (CARDIZEM CD) 240 MG 24 hr capsule Take 1 capsule (240 mg total) by mouth once daily.  Qty: 30 capsule, Refills: 11    Comments: .      fluticasone propionate (FLONASE) 50 mcg/actuation nasal spray 2 sprays (100 mcg total) by Each Nostril route once daily.  Qty: 16 g, Refills: 12      fluticasone-salmeterol diskus inhaler 250-50 mcg Inhale 1 puff into the lungs 2 (two) times daily. Controller  Qty: 180 each, Refills: 3      furosemide (LASIX) 20 MG tablet Take 1 tablet (20 mg total) by mouth once daily.  Qty: 90 tablet, Refills: 2    Comments: Take an additional 20mg lasix (40mg total) in the morning as needed for couple pound weight gain or worsening swelling  Associated Diagnoses: Cardiomyopathy, ischemic; Chronic combined systolic (congestive) and diastolic (congestive) heart failure      hydrALAZINE (APRESOLINE) 100 MG tablet Take 0.5 tablets (50 mg total) by mouth 2 (two) times daily.  Qty: 270 tablet, Refills: 3    Comments: .  Associated Diagnoses: Essential hypertension      insulin (LANTUS SOLOSTAR U-100 INSULIN) glargine 100 units/mL (3mL) SubQ pen Inject into the skin.      irbesartan (AVAPRO) 300 MG tablet Take 1 tablet (300 mg total) by mouth every evening.  Qty: 90 tablet, Refills: 3    Comments: .  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      magnesium oxide (MAG-OX) 400 mg tablet Take 1 tablet (400 mg total) by mouth once daily.  Refills: 0      montelukast (SINGULAIR) 10 mg tablet Take 1 tablet (10 mg total) by mouth every evening.  Qty: 90 tablet, Refills: 3    Associated Diagnoses: Mild intermittent chronic asthma without complication      nitroGLYCERIN (NITROSTAT) 0.4 MG SL tablet Place 0.4 mg under the tongue every 5 (five) minutes as needed for Chest pain.       omega-3 fatty acids 500 mg Cap Take 1 capsule by mouth Twice daily.      pen needle, diabetic (BD ULTRA-FINE MINI PEN NEEDLE) 31 gauge x 3/16" Ndle USE WITH LANTUS AT BEDTIME  Qty: 400 each, Refills: 3    Associated Diagnoses: Diabetic polyneuropathy associated with type 2 diabetes mellitus      polyethylene glycol (GLYCOLAX) 17 gram PwPk Take 17 g by mouth daily as needed (constipation).  Qty: 10 each, Refills: 1      pravastatin (PRAVACHOL) 40 MG tablet Take 1 tablet (40 mg total) by mouth once daily.  Qty: 90 tablet, Refills: 3    Associated Diagnoses: Mixed hyperlipidemia      sodium chloride 3% 3 % nebulizer solution TAKE 4 MLS BY NEBULIZATION 4 (FOUR) TIMES DAILY AS NEEDED FOR OTHER OR COUGH (TO INDUCE SPUTUM AND CLEAR MUCOUS.).  Qty: 750 mL, Refills: 11    Associated Diagnoses: Severe persistent chronic asthma without complication      tiotropium (SPIRIVA) 18 mcg inhalation capsule Inhale 1 capsule (18 mcg total) into the lungs once daily. Controller  Qty: 90 capsule, Refills: 3    Associated Diagnoses: SOB (shortness of breath); Mild intermittent chronic asthma without complication      pulse oximeter (PULSE OXIMETER) device by Apply Externally route 2 (two) times a day. Use twice daily at 8 AM and 3 PM and record the value in [employer] as directed.  Qty: 1 each, Refills: 0    Comments: This is a NO CHARGE item.  Please override " price to zero.  DO NOT PRINT.  NORMAL MODE e-PRESCRIBE ONLY.         STOP taking these medications       voriconazole (VFEND) 200 MG Tab Comments:   Reason for Stopping:                 I have seen and examined this patient within the last 30 days. My clinical findings that support the need for the home health skilled services and home bound status are the following:no   Weakness/numbness causing balance and gait disturbance due to Weakness/Debility making it taxing to leave home.     Diet:   diabetic diet 2000 calorie    Labs:  SN to perform labs:  BMP: in 1 week.    Referrals/ Consults  Physical Therapy to evaluate and treat. Evaluate for home safety and equipment needs; Establish/upgrade home exercise program. Perform / instruct on therapeutic exercises, gait training, transfer training, and Range of Motion.  Occupational Therapy to evaluate and treat. Evaluate home environment for safety and equipment needs. Perform/Instruct on transfers, ADL training, ROM, and therapeutic exercises.    Activities:   activity as tolerated    Nursing:   Agency to admit patient within 24 hours of hospital discharge unless specified on physician order or at patient request    SN to complete comprehensive assessment including routine vital signs. Instruct on disease process and s/s of complications to report to MD. Review/verify medication list sent home with the patient at time of discharge  and instruct patient/caregiver as needed. Frequency may be adjusted depending on start of care date.     Skilled nurse to perform up to 3 visits PRN for symptoms related to diagnosis    Notify MD if SBP > 160 or < 90; DBP > 90 or < 50; HR > 120 or < 50; Temp > 101; O2 < 88%; Other:       Ok to schedule additional visits based on staff availability and patient request on consecutive days within the home health episode.    When multiple disciplines ordered:    Start of Care occurs on Sunday - Wednesday schedule remaining discipline evaluations as  ordered on separate consecutive days following the start of care.    Thursday SOC -schedule subsequent evaluations Friday and Monday the following week.     Friday - Saturday SOC - schedule subsequent discipline evaluations on consecutive days starting Monday of the following week.    For all post-discharge communication and subsequent orders please contact patient's primary care physician.     Miscellaneous   De [place] Cruz Care: Instruct patient/caregiver to empty De [place] Cruz bag.  Change De [place] Cruz every month.  Diabetic Care:   SN to perform and educate Diabetic management with blood glucose monitoring:, Fingerstick blood sugar AC and HS and Report CBG < 60 or > 350 to physician.    Home Health Aide:  Physical Therapy Three times weekly and Occupational Therapy Three times weekly    Wound Care Orders  no    I certify that this patient is confined to her home and needs physical therapy and occupational therapy.

## 2022-04-05 NOTE — PROGRESS NOTES
Nephrology Progress Note       Consult Requested By: Yrn Sheppard MD  Reason for Consult: VIRGILIO     SUBJECTIVE:    ?    Review of Systems   Constitutional: Negative for chills and fever.   HENT: Negative for congestion and sore throat.    Eyes: Negative for blurred vision, double vision and photophobia.   Respiratory: Negative for cough and shortness of breath.    Cardiovascular: Negative for chest pain, palpitations and leg swelling.   Gastrointestinal: Negative for abdominal pain, diarrhea, nausea and vomiting.   Genitourinary: Negative for dysuria and urgency.   Musculoskeletal: Negative for joint pain and myalgias.   Skin: Negative for itching and rash.   Neurological: Positive for weakness (chronic ). Negative for dizziness, sensory change and headaches.   Endo/Heme/Allergies: Negative for polydipsia. Does not bruise/bleed easily.   Psychiatric/Behavioral: Negative for depression.       Past Medical History:   Diagnosis Date    Acute hypoxemic respiratory failure 12/19/2019    Acute right-sided thoracic back pain 12/09/2019    Allergy     Asthma     Basal cell carcinoma     left forehead    Basal cell carcinoma     left nose    Basal cell carcinoma 05/20/2015    right nose    Basal cell carcinoma 12/22/2015    left lower post neck    Basal cell carcinoma 12/03/2019    left ant scalp     Breast cancer     CAD (coronary artery disease)     Cardiomyopathy     Cardiomyopathy, ischemic     Cataract     CHF (congestive heart failure)     Chronic kidney disease, stage 3, mod decreased GFR 02/14/2017    Colon polyp 2011    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, with long-term current use of insulin 02/22/2018    COPD (chronic obstructive pulmonary disease)     COPD exacerbation 04/08/2018    Current mild episode of major depressive disorder without prior episode 01/25/2022    Defibrillator discharge     Diabetes  mellitus     Diabetes mellitus type II     Diabetes with neurologic complications     Goiter     MNG    HX: breast cancer     Hyperlipidemia     Hypertension     Iron deficiency anemia 05/16/2017    Left kidney mass     Meningioma     Microalbuminuria due to type 2 diabetes mellitus 01/26/2022    Osteoporosis, postmenopausal     Pneumonia 12/08/2019    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    Proteinuria 1/21/2019    Pseudomonas pneumonia     Skin cancer     s/p excision    Sleep apnea     CPAP    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/2015    mid forehead    Unspecified vitamin D deficiency     UTI (urinary tract infection) 4/2/2022    Ventricular tachycardia     Vitamin B12 deficiency     Vitamin D deficiency disease          OBJECTIVE:     Vital Signs (Most Recent)  Vitals:    04/05/22 0539 04/05/22 0728 04/05/22 0733 04/05/22 0837   BP: (!) 163/71  (!) 157/72    Patient Position: Lying      Pulse: 102 105 105 109   Resp: 18 18 18    Temp: 98.8 °F (37.1 °C)  97.4 °F (36.3 °C)    TempSrc: Oral  Axillary    SpO2: (!) 90% (!) 91% 99%    Weight:       Height:                     Medications:   albuterol-ipratropium  3 mL Nebulization Q4H WAKE    amLODIPine  5 mg Oral Daily    carvediloL  50 mg Oral BID WM    ciprofloxacin HCl  500 mg Oral Q12H    heparin (porcine)  5,000 Units Subcutaneous Q8H    hydrALAZINE  100 mg Oral Q8H    losartan  100 mg Oral Daily    mupirocin   Nasal BID    pravastatin  40 mg Oral Daily    senna-docusate 8.6-50 mg  1 tablet Oral BID    sodium bicarbonate  1,300 mg Oral TID    sodium chloride 0.9%  10 mL Intravenous Q8H           Physical Exam  Vitals and nursing note reviewed.   Constitutional:       General: She is not in acute distress.     Appearance: She is ill-appearing. She is not diaphoretic.   HENT:      Head: Normocephalic and atraumatic.      Mouth/Throat:      Pharynx: No oropharyngeal exudate.   Eyes:      General: No scleral icterus.      Conjunctiva/sclera: Conjunctivae normal.      Pupils: Pupils are equal, round, and reactive to light.   Cardiovascular:      Rate and Rhythm: Normal rate and regular rhythm.      Heart sounds: Normal heart sounds. No murmur heard.  Pulmonary:      Effort: Pulmonary effort is normal. No respiratory distress.      Breath sounds: Rales (RLL) present.   Abdominal:      General: Bowel sounds are normal. There is no distension.      Palpations: Abdomen is soft.      Tenderness: There is no abdominal tenderness.   Musculoskeletal:         General: Swelling present. Normal range of motion.      Cervical back: Normal range of motion and neck supple.      Right lower leg: Edema present.      Left lower leg: Edema present.   Skin:     General: Skin is warm and dry.      Findings: No erythema.   Neurological:      Mental Status: She is alert and oriented to person, place, and time.      Cranial Nerves: No cranial nerve deficit.   Psychiatric:         Mood and Affect: Affect normal.         Cognition and Memory: Memory normal.         Judgment: Judgment normal.         Laboratory:  Recent Labs   Lab 04/04/22  0301 04/04/22  1343 04/05/22  0442   WBC 11.27 10.53 11.64   HGB 9.0* 8.9* 8.6*   HCT 26.8* 26.6* 26.8*    253 268   MONO 7.2  0.8 7.1  0.8 7.8  0.9     Recent Labs   Lab 04/03/22  1945 04/04/22  0301 04/05/22  0442   * 129* 134*   K 4.8 4.5 3.8   CL 96 99 100   CO2 19* 20* 21*   BUN 63* 59* 53*   CREATININE 2.7* 2.5* 2.4*   CALCIUM 8.4* 8.7 8.4*   PHOS 4.8* 5.0* 4.6*       Diagnostic Results:  X-Ray: Reviewed  US: Reviewed  Echo: Reviewed  ASSESSMENT/PLAN:     1. VIRGILIO - on CKD4?5 [place] [facility] due to DM/HTN has proteinuria nephrotic range.    -- Cr ?  baseline 2.6 had multiple admission and VIRGILIO - stable 2.6-2.5 at baseline   -- Please avoid nephrotoxins, including NSAIDs, aminoglycosides, IV contrast (unless absolutely necessary), gadolinium, fleets and other phosphorous-based laxatives. Caution with  antibiotics.  -- Daily Renal Function Panel  -- Avoid Hypotension.  -- Renally dose all meds  -- Avoid Vanc Zosyn Combo unless absolutely  indicated   Hyponatremia  -   pseudo Serum osm - wnr x3  Not clear etiology at this point -? multifactorial,   Sepsis,   -- However Serum osm - wnr doesn't fit, has no symptoms as of now   -- NaBicarb will help with acidosis and Na+ - so far improving          2. HTN (I10) - elevated but in concern for Sepsis monitor for now   -- resume lasix first coreg and ARB   3. Anemia of chronic kidney disease   -- check iron levels   Recent Labs   Lab 04/04/22  0301 04/04/22  1343 04/05/22  0442   HGB 9.0* 8.9* 8.6*   HCT 26.8* 26.6* 26.8*    253 268       Iron   Lab Results   Component Value Date    IRON 41 10/21/2020    TIBC 425 10/21/2020    FERRITIN 95 12/12/2020       4. MBD (E88.9 M90.80) -  Recent Labs   Lab 04/05/22  0442   CALCIUM 8.4*   PHOS 4.6*     Recent Labs   Lab 04/03/22  0334 04/04/22  0301 04/05/22  0442   MG 2.0 2.0 1.7     Acidosis - improving   Lab Results   Component Value Date    PTH 72.0 03/26/2019    CALCIUM 8.4 (L) 04/05/2022    PHOS 4.6 (H) 04/05/2022     Lab Results   Component Value Date    [id number] 31 01/25/2022       Lab Results   Component Value Date    CO2 21 (L) 04/05/2022   -- PO bicarbonate     5. Nutrition/Hypoalbuminemia (E88.09) -    Recent Labs   Lab 04/03/22  1945 04/04/22  0301   ALBUMIN 2.5* 2.6*     Nepro with meals TID. Renal vitamins daily    Total critical care time 45 minutes: included management of organ failures related to critical illness, titration of continuous infusions, review of pertinent labs and imaging studies, discussion with primary team and family.    Thank you for the consult, will follow  With any question please call [phone]  Anna Fairbanks MD    [facility]  YONG Austin MD, FACESTRELLA DE SOUZA MD,   O. MD DAYANNA Borrero MD E. V. Harmon, NP    [address]  [place],  [id number] [phone]

## 2022-04-05 NOTE — PT/OT/SLP PROGRESS
"Occupational Therapy   Treatment    Name: Myesha Quinones  MRN: [id number]  Admitting Diagnosis:  Hyponatremia       Recommendations:     Discharge Recommendations: home health PT, home health OT (with family care/assistance)  Discharge Equipment Recommendations:  other (see comments) (TBD)  Barriers to discharge:  Decreased caregiver support weakness/decreased endurance    Assessment:     Myesha Quinones is a 81 y.o. female with a medical diagnosis of Hyponatremia.  She presents with The primary encounter diagnosis was Hyponatremia. Diagnoses of Hypotension, Chest pain, SIRS (systemic inflammatory response syndrome), Hypothermia, initial encounter, Fatigue, unspecified type, Bradycardia, Hyperkalemia, Acute on chronic combined systolic and diastolic heart failure, ABPA (allergic bronchopulmonary aspergillosis), Obstructive sleep apnea, Mycobacterium avium complex, and Urinary retention were also pertinent to this visit. Performance deficits affecting function are weakness, impaired endurance, impaired cardiopulmonary response to activity, decreased safety awareness, gait instability, impaired functional mobilty, impaired balance, impaired self care skills, decreased lower extremity function, decreased coordination.     Pt progressing well towards goals, agreeable to therapy this date. No c/o pain, pt reports feeling "weak". Pt found sitting up in chair, /73, HR 98, O2 92%. Pt performed G&H with Kourtney sitting up in chair. Pt performed sit<>stand with CGA and use of RW. Pt performed functional mobility in room with CGA and use of RW. After functional mobility /74, , O2 89%. Pt given increased v/cs for PLB, O2 recovering to 92%. Pt educated on pacing strategies & BLE elevated at end of session. Pt & daughter educated to call staff for all assistance. Per chart pt is to d/c home this date with  OT/PT, pt would benefit from family care/assistance upon d/c due to increased weakness/decreased " "endurance.    Rehab Prognosis:  Good; patient would benefit from acute skilled OT services to address these deficits and reach maximum level of function.       Plan:     Patient to be seen 3 x/week to address the above listed problems via self-care/home management, therapeutic activities, therapeutic exercises  · Plan of Care Expires: 05/04/22  · Plan of Care Reviewed with: patient, daughter    Subjective     Pain/Comfort:  · Pain Rating 1: 0/10  · Pain Rating Post-Intervention 1: 0/10    Objective:     Communicated with: brandon prior to session.  Patient found up in chair with lauren catheter, peripheral IV, telemetry upon OT entry to room.    General Precautions: Standard, fall   Orthopedic Precautions:N/A   Braces: N/A  Respiratory Status: Room air     Occupational Performance:     Bed Mobility:    · n/a    Functional Mobility/Transfers:  · Patient completed Sit <> Stand Transfer with stand by assistance and contact guard assistance  with  rolling walker   · Functional Mobility: Pt performed functional mobility in room with CGA and use of RW, pt noted with increased WOB after functional mobility. O2 taken at 89%, pt educated on PLB & O2 recovering quickly to 92%.     Activities of Daily Living:  · Grooming: modified independence to brush hair & perform oral hygiene seated up in chair      [facility] 6 Click ADL: 20    Treatment & Education:  Pt progressing well towards goals, agreeable to therapy this date.   No c/o pain, pt reports feeling "weak".   Pt found sitting up in chair, /73, HR 98, O2 92%.   Pt performed G&H with Kourtney sitting up in chair.   Pt performed sit<>stand with CGA and use of RW.   Pt performed functional mobility in room with CGA and use of RW.   After functional mobility /74, , O2 89%.   Pt given increased v/cs for PLB, O2 recovering to 92%.   Pt educated on pacing strategies & BLE elevated at end of session.     Pt & daughter educated to call staff for all assistance. Per chart pt is " to d/c home this date with  OT/PT, pt would benefit from family care/assistance upon d/c due to increased weakness/decreased endurance.    Patient left up in chair with all lines intact, call button in reach, nsg notified and daughter presentEducation:      GOALS:   Multidisciplinary Problems     Occupational Therapy Goals     Not on file          Multidisciplinary Problems (Resolved)        Problem: Occupational Therapy    Goal Priority Disciplines Outcome Interventions   Occupational Therapy Goal   (Resolved)     OT, PT/OT Met    Description: Goals to be met by: 5/4/2022     Patient will increase functional independence with ADLs by performing:    UE Dressing with Modified [place].  LE Dressing with Modified [place].  Grooming while standing with Stand-by Assistance.  Toileting from toilet with Modified [place] for hygiene and clothing management.   Step transfer with Modified [place] & appropriate AD.  Toilet transfer to toilet with Modified [place] & appropriate AD.                      Time Tracking:     OT Date of Treatment: 04/05/22  OT Start Time: 0918  OT Stop Time: 0941  OT Total Time (min): 23 min    Billable Minutes:Self Care/Home Management 10  Therapeutic Activity 13    OT/CRISS: OT     CRISS Visit Number: 0    4/5/2022

## 2022-04-05 NOTE — PLAN OF CARE
VN note: VN completed AVS and attachments and notified bedside nurse, Tatiana. Will cont to be available and intervene prn.

## 2022-04-05 NOTE — NURSING
Pt's daughter advised that she not feel comfortable with discharge. She would like for her mother to be monitored due to her re-starting voriconazole. MD notified.    MD okay to keep patient for one more day for continued monitoring; discharge held.

## 2022-04-05 NOTE — PT/OT/SLP PROGRESS
Physical Therapy Treatment    Patient Name:  Myesha Quinones   MRN:  [id number]    Recommendations:     Discharge Recommendations:  home health PT   Discharge Equipment Recommendations:  (TBD)   Barriers to discharge: decreased mobility,strength and endurance    Assessment:     Myesha Quinones is a 81 y.o. female admitted with a medical diagnosis of Hyponatremia.  She presents with the following impairments/functional limitations:  weakness, impaired endurance, impaired functional mobilty, gait instability, impaired balance, decreased lower extremity function, decreased ROM, impaired coordination,pt with improving status and remains with decreased strength and endurance,pt will benefit from  services upon discharge.    Rehab Prognosis: Fair; patient would benefit from acute skilled PT services to address these deficits and reach maximum level of function.    Recent Surgery: * No surgery found *      Plan:     During this hospitalization, patient to be seen 5 x/week to address the identified rehab impairments via gait training, therapeutic activities, therapeutic exercises, neuromuscular re-education and progress toward the following goals:    · Plan of Care Expires:  05/04/22    Subjective     Chief Complaint: n/a  Patient/Family Comments/goals: pt agreeable to up in chair.  Pain/Comfort:  · Pain Rating 1:  (no c/o's)      Objective:     Communicated with nsg prior to session.  Patient found supine with bed alarm, lauren catheter, peripheral IV, telemetry upon PT entry to room.     General Precautions: Standard, fall   Orthopedic Precautions:N/A   Braces: N/A  Respiratory Status: Room air     Functional Mobility:  · Bed Mobility:     · Supine to Sit: contact guard assistance  · Transfers:     · Sit to Stand:  stand by assistance with rolling walker  · Bed to Chair: stand by assistance and contact guard assistance with  rolling walker  using  ambulation  · Gait: amb ~20' with RW and CGA with IV in  tow  · Balance: fair standing balance with RW      AM-PAC 6 CLICK MOBILITY  Turning over in bed (including adjusting bedclothes, sheets and blankets)?: 3  Sitting down on and standing up from a chair with arms (e.g., wheelchair, bedside commode, etc.): 3  Moving from lying on back to sitting on the side of the bed?: 3  Moving to and from a bed to a chair (including a wheelchair)?: 3  Need to walk in hospital room?: 3  Climbing 3-5 steps with a railing?: 1  Basic Mobility Total Score: 16       Therapeutic Activities and Exercises: le supine ex's x 10-12 reps inc: ap,qs,hs,abd/add,slr.       Patient left up in chair with all lines intact, call button in reach and daughter present..    GOALS:   Multidisciplinary Problems     Physical Therapy Goals        Problem: Physical Therapy    Goal Priority Disciplines Outcome Goal Variances Interventions   Physical Therapy Goal     PT, PT/OT Ongoing, Progressing     Description: Goals to be met by:  2022     Patient will increase functional independence with mobility by performin. Supine to sit with Modified [place]  2. Sit to supine with Modified [place]  3. Sit to stand transfer with Modified [place]  4. Gait  x 150 feet with Supervision using Rolling Walker.   5.  Tolerate OOB x 2 hours BID                     Time Tracking:     PT Received On: 22  PT Start Time: 812     PT Stop Time: 836  PT Total Time (min): 24 min     Billable Minutes: Gait Training 14 and Therapeutic Exercise 10    Treatment Type: Treatment  PT/PTA: PTA     PTA Visit Number: 1     2022

## 2022-04-05 NOTE — PLAN OF CARE
Discharge orders noted. Patient to discharge home and resume HH. Home health orders sent via [facility] to [facility], [facility]. Appointments previously scheduled.    [facility] [place] [phone]    TN spoke with Jazmin at [facility]. She is aware of discharge today and orders sent via Careport.    1305--Patients discharge order discontinued. TN met with patient and daughter at bedside. All questions answered. They are aware and agreeable to the discharge plan.    Patient Contacts    Name Relation Home Work Mobile   Betsy Bowen Daughter [phone]   Rigo Quinones Son   [phone]   Herminio Quinones Son   [phone]       Future Appointments   Date Time Provider Department [place]   4/7/2022 10:30 AM LAB, JANES KENH LAB [place]   4/7/2022 10:45 AM SPECIMEN, [facility] SPECLAB [place]   4/8/2022 10:05 AM LABJANES LAB [place]   4/11/2022  8:30 AM LAB, APPOINTMENT [facility]   4/11/2022  9:00 AM Li Lott PA-C [facility] hospital   4/11/2022  9:45 AM [facility] HEART FAILURE NURSE Critical access hospital   4/12/2022 11:00 AM Kelvin Maki MD [facility] PULMSVC [facility]   4/14/2022 10:30 AM Mary Jane Mccoy MD [facility] NEPHRO [facility]   4/19/2022  9:30 AM Steffen Osborn MD [facility] UROLOGY [facility]   4/26/2022 10:30 AM Catrachita Rothman MD [facility] IM [facility] PCW   5/8/2022 10:00 AM HOME MONITOR DEVICE CHECK, [facility] ARRHPRO [facility]   5/10/2022 11:00 AM Catia Amaya MD [facility] ID [facility]   5/11/2022  8:40 AM COORDINATED DEVICE CHECK [facility] ARRMIRA L[facility]   5/11/2022  9:15 AM EKG, APPT [facility] EKG [facility]   5/11/2022  9:40 AM Jan Mckeon MD [facility] ARRHYTH [facility]   6/6/2022  9:00 AM Elidia Madison NP [facility] Physical [phone] [facility] [address] [place] [id number] Steps: Follow upAppointment: Instructions: [facility]       04/05/22  1223   Final Note   Assessment Type Final Discharge Note   Anticipated Discharge Disposition Home-Health   Hospital Resources/Appts/Education Provided Appointments scheduled by Navigator/Coordinator   Post-Acute Status   Post-Acute Authorization Home Health   Home Health Status Set-up Complete/Auth obtained   Discharge Delays None known at this time     Linda Herrera RN    [phone]

## 2022-04-05 NOTE — PROGRESS NOTES
Instructed pt on how to provide sputum sample, and sputum cup left at bedside. Will continue to monitor.

## 2022-04-05 NOTE — PHARMACY MED REC
"Admission Medication History     The home medication history was taken by Destinee Rg PharmD.    Medication history obtained from patient    You may go to "Admission" then "Reconcile Home Medications" tabs to review and/or act upon these items.      The home medication list has been updated by the Pharmacy department.    Please read ALL comments highlighted in yellow.    Please address this information as you see fit.     Feel free to contact us if you have any questions or require assistance.      The medications listed below were removed from the home medication list.  Please reorder if appropriate:    o Patient reports he/she IS TAKING the following which was not ordered upon admit  o Clonidine 0.1mg/24hr every Tues  o Diltiazem 240mg daily  o Furosemide 20mg daily  o Irbesartan 300mg daily      Destinee Rg PharmD.                  .          "

## 2022-04-06 VITALS
WEIGHT: 147.06 LBS | BODY MASS INDEX: 26.06 KG/M2 | HEART RATE: 83 BPM | DIASTOLIC BLOOD PRESSURE: 60 MMHG | OXYGEN SATURATION: 92 % | HEIGHT: 63 IN | SYSTOLIC BLOOD PRESSURE: 133 MMHG | RESPIRATION RATE: 20 BRPM | TEMPERATURE: 98 F

## 2022-04-06 LAB
ANION GAP SERPL CALC-SCNC: 12 MMOL/L (ref 8–16)
BUN SERPL-MCNC: 46 MG/DL (ref 8–23)
CALCIUM SERPL-MCNC: 8.6 MG/DL (ref 8.7–10.5)
CHLORIDE SERPL-SCNC: 96 MMOL/L (ref 95–110)
CO2 SERPL-SCNC: 24 MMOL/L (ref 23–29)
CREAT SERPL-MCNC: 2.1 MG/DL (ref 0.5–1.4)
EST. GFR  (AFRICAN AMERICAN): 25 ML/MIN/1.73 M^2
EST. GFR  (NON AFRICAN AMERICAN): 21 ML/MIN/1.73 M^2
GLUCOSE SERPL-MCNC: 287 MG/DL (ref 70–110)
POCT GLUCOSE: 154 MG/DL (ref 70–110)
POCT GLUCOSE: 207 MG/DL (ref 70–110)
POCT GLUCOSE: 277 MG/DL (ref 70–110)
POTASSIUM SERPL-SCNC: 3.3 MMOL/L (ref 3.5–5.1)
SODIUM SERPL-SCNC: 132 MMOL/L (ref 136–145)

## 2022-04-06 PROCEDURE — 25000003 PHARM REV CODE 250: Performed by: HOSPITALIST

## 2022-04-06 PROCEDURE — 80048 BASIC METABOLIC PNL TOTAL CA: CPT | Performed by: INTERNAL MEDICINE

## 2022-04-06 PROCEDURE — 36415 COLL VENOUS BLD VENIPUNCTURE: CPT | Performed by: INTERNAL MEDICINE

## 2022-04-06 PROCEDURE — 63600175 PHARM REV CODE 636 W HCPCS: Performed by: NURSE PRACTITIONER

## 2022-04-06 PROCEDURE — 97116 GAIT TRAINING THERAPY: CPT | Mod: CQ

## 2022-04-06 PROCEDURE — 25000003 PHARM REV CODE 250: Performed by: STUDENT IN AN ORGANIZED HEALTH CARE EDUCATION/TRAINING PROGRAM

## 2022-04-06 PROCEDURE — 25000003 PHARM REV CODE 250: Performed by: INTERNAL MEDICINE

## 2022-04-06 PROCEDURE — 25000242 PHARM REV CODE 250 ALT 637 W/ HCPCS: Performed by: STUDENT IN AN ORGANIZED HEALTH CARE EDUCATION/TRAINING PROGRAM

## 2022-04-06 PROCEDURE — 25000003 PHARM REV CODE 250: Performed by: NURSE PRACTITIONER

## 2022-04-06 PROCEDURE — 94640 AIRWAY INHALATION TREATMENT: CPT

## 2022-04-06 PROCEDURE — 97530 THERAPEUTIC ACTIVITIES: CPT | Mod: CQ

## 2022-04-06 PROCEDURE — 94761 N-INVAS EAR/PLS OXIMETRY MLT: CPT

## 2022-04-06 PROCEDURE — 99900035 HC TECH TIME PER 15 MIN (STAT)

## 2022-04-06 PROCEDURE — 94660 CPAP INITIATION&MGMT: CPT

## 2022-04-06 RX ORDER — POTASSIUM CHLORIDE 750 MG/1
40 TABLET, EXTENDED RELEASE ORAL ONCE
Status: COMPLETED | OUTPATIENT
Start: 2022-04-06 | End: 2022-04-06

## 2022-04-06 RX ORDER — CIPROFLOXACIN 500 MG/1
500 TABLET ORAL EVERY 24 HOURS
Status: DISCONTINUED | OUTPATIENT
Start: 2022-04-07 | End: 2022-04-06 | Stop reason: HOSPADM

## 2022-04-06 RX ADMIN — CIPROFLOXACIN 500 MG: 500 TABLET, FILM COATED ORAL at 09:04

## 2022-04-06 RX ADMIN — MUPIROCIN: 20 OINTMENT TOPICAL at 09:04

## 2022-04-06 RX ADMIN — AMLODIPINE BESYLATE 5 MG: 5 TABLET ORAL at 09:04

## 2022-04-06 RX ADMIN — CARVEDILOL 50 MG: 25 TABLET, FILM COATED ORAL at 09:04

## 2022-04-06 RX ADMIN — POTASSIUM CHLORIDE 40 MEQ: 750 TABLET, EXTENDED RELEASE ORAL at 01:04

## 2022-04-06 RX ADMIN — HEPARIN SODIUM 5000 UNITS: 5000 INJECTION INTRAVENOUS; SUBCUTANEOUS at 05:04

## 2022-04-06 RX ADMIN — IPRATROPIUM BROMIDE AND ALBUTEROL SULFATE 3 ML: .5; 3 SOLUTION RESPIRATORY (INHALATION) at 12:04

## 2022-04-06 RX ADMIN — SODIUM BICARBONATE 1300 MG: 650 TABLET ORAL at 09:04

## 2022-04-06 RX ADMIN — LOSARTAN POTASSIUM 100 MG: 50 TABLET, FILM COATED ORAL at 09:04

## 2022-04-06 RX ADMIN — IPRATROPIUM BROMIDE AND ALBUTEROL SULFATE 3 ML: .5; 3 SOLUTION RESPIRATORY (INHALATION) at 07:04

## 2022-04-06 RX ADMIN — PRAVASTATIN SODIUM 40 MG: 40 TABLET ORAL at 09:04

## 2022-04-06 RX ADMIN — HYDRALAZINE HYDROCHLORIDE 100 MG: 25 TABLET, FILM COATED ORAL at 05:04

## 2022-04-06 RX ADMIN — DOCUSATE SODIUM AND SENNOSIDES 1 TABLET: 8.6; 5 TABLET, FILM COATED ORAL at 09:04

## 2022-04-06 NOTE — SUBJECTIVE & OBJECTIVE
Interval History:  Still c/o generalized weakness, although improved. Pt and daughter feel pt isn't ready for discharge yet.    Review of Systems   Constitutional:  Positive for appetite change and fatigue.   Respiratory:  Positive for cough and shortness of breath.    Cardiovascular:  Negative for chest pain.   Gastrointestinal:  Negative for abdominal pain and diarrhea.   Neurological:  Positive for weakness.   Objective:     Vital Signs (Most Recent):  Temp: 97.9 °F (36.6 °C) (04/06/22 1148)  Pulse: 83 (04/06/22 1206)  Resp: 20 (04/06/22 1206)  BP: 133/60 (04/06/22 1148)  SpO2: (!) 92 % (04/06/22 1206)   Vital Signs (24h Range):  Temp:  [97.9 °F (36.6 °C)-98.2 °F (36.8 °C)] 97.9 °F (36.6 °C)  Pulse:  [78-97] 83  Resp:  [14-20] 20  SpO2:  [92 %-100 %] 92 %  BP: (133-170)/(60-74) 133/60     Weight: 66.7 kg (147 lb 0.8 oz)  Body mass index is 26.05 kg/m².    Intake/Output Summary (Last 24 hours) at 4/6/2022 1623  Last data filed at 4/6/2022 0800  Gross per 24 hour   Intake 440 ml   Output 1100 ml   Net -660 ml        Physical Exam  Vitals and nursing note reviewed.   Constitutional:       Appearance: She is obese. She is ill-appearing.   HENT:      Head: Normocephalic and atraumatic.      Nose: Nose normal.      Mouth/Throat:      Mouth: Mucous membranes are moist.   Eyes:      Extraocular Movements: Extraocular movements intact.      Conjunctiva/sclera: Conjunctivae normal.      Pupils: Pupils are equal, round, and reactive to light.   Cardiovascular:      Rate and Rhythm: Regular rhythm. Bradycardia present.      Pulses: Normal pulses.      Heart sounds: Normal heart sounds. No murmur heard.  Pulmonary:      Effort: Pulmonary effort is normal.      Breath sounds: Rhonchi present. No wheezing.   Abdominal:      General: Bowel sounds are normal. There is no distension.      Palpations: Abdomen is soft.      Tenderness: There is no abdominal tenderness. There is no guarding.   Musculoskeletal:         General:  Swelling present.      Cervical back: Normal range of motion and neck supple.      Right lower leg: Edema present.      Left lower leg: Edema present.   Skin:     General: Skin is warm and dry.      Capillary Refill: Capillary refill takes 2 to 3 seconds.      Coloration: Skin is pale.      Findings: Bruising present. No erythema or rash.   Neurological:      Mental Status: She is alert and oriented to person, place, and time. Mental status is at baseline.   Psychiatric:         Mood and Affect: Mood normal.         Behavior: Behavior normal.         Thought Content: Thought content normal.         Judgment: Judgment normal.       Significant Labs: All pertinent labs within the past 24 hours have been reviewed.    Significant Imaging: I have reviewed all pertinent imaging results/findings within the past 24 hours.

## 2022-04-06 NOTE — PT/OT/SLP PROGRESS
Physical Therapy Treatment    Patient Name:  Myesha Quinones   MRN:  [id number]    Recommendations:     Discharge Recommendations:  home health PT   Discharge Equipment Recommendations:  (TBD)   Barriers to discharge: decreased mobility,strength and endurance    Assessment:     Myesha Quinones is a 81 y.o. female admitted with a medical diagnosis of Hyponatremia.  She presents with the following impairments/functional limitations:  weakness, impaired endurance, impaired functional mobilty, gait instability, impaired balance, decreased upper extremity function, decreased lower extremity function, decreased ROM, impaired coordination,pt with improving status and will benefit from  services to address above functional limitations.    Rehab Prognosis: Good; patient would benefit from acute skilled PT services to address these deficits and reach maximum level of function.    Recent Surgery: * No surgery found *      Plan:     During this hospitalization, patient to be seen 5 x/week to address the identified rehab impairments via gait training, therapeutic activities, therapeutic exercises, neuromuscular re-education and progress toward the following goals:    · Plan of Care Expires:  05/04/22    Subjective     Chief Complaint: n/a  Patient/Family Comments/goals: pt may go home today.  Pain/Comfort:  · Pain Rating 1: 0/10      Objective:     Communicated with nsg prior to session.  Patient found supine with lauren catheter, peripheral IV, telemetry upon PT entry to room.     General Precautions: Standard, fall   Orthopedic Precautions:N/A   Braces: N/A  Respiratory Status: Room air     Functional Mobility:  · Bed Mobility:     · Supine to Sit: supervision  · Transfers:     · Sit to Stand:  supervision and stand by assistance with rolling walker  · Bed to Chair: stand by assistance with  rolling walker  using  ambulation  · Gait: amb ~75' with RW and decreased pace with S  · Balance: fair standing balance with  RW      AM-PAC 6 CLICK MOBILITY  Turning over in bed (including adjusting bedclothes, sheets and blankets)?: 3  Sitting down on and standing up from a chair with arms (e.g., wheelchair, bedside commode, etc.): 3  Moving from lying on back to sitting on the side of the bed?: 3  Moving to and from a bed to a chair (including a wheelchair)?: 3  Need to walk in hospital room?: 3  Climbing 3-5 steps with a railing?: 1  Basic Mobility Total Score: 16       Therapeutic Activities and Exercises:  Issues HEP and reviewed pt safety.       Patient left up in chair with all lines intact, call button in reach and daughter present..    GOALS:   Multidisciplinary Problems     Physical Therapy Goals     Not on file          Multidisciplinary Problems (Resolved)        Problem: Physical Therapy    Goal Priority Disciplines Outcome Goal Variances Interventions   Physical Therapy Goal   (Resolved)     PT, PT/OT Met     Description: Goals to be met by:  2022     Patient will increase functional independence with mobility by performin. Supine to sit with Modified [place]  2. Sit to supine with Modified [place]  3. Sit to stand transfer with Modified [place]  4. Gait  x 150 feet with Supervision using Rolling Walker.   5.  Tolerate OOB x 2 hours BID                     Time Tracking:     PT Received On: 22  PT Start Time: 817     PT Stop Time: 841  PT Total Time (min): 24 min     Billable Minutes: Gait Training 15 and Therapeutic Activity 9    Treatment Type: Treatment  PT/PTA: PTA     PTA Visit Number: 2     2022

## 2022-04-06 NOTE — ASSESSMENT & PLAN NOTE
This patient does have evidence of infective focus  My overall impression is sepsis. Vital signs were reviewed and noted in progress note.  Antibiotics given-   Antibiotics (From admission, onward)            None        Cultures were taken-   Microbiology Results (last 7 days)     Procedure Component Value Units Date/Time    AFB Culture & Smear [[id number]] Collected: 04/05/22 0716    Order Status: Completed Specimen: Respiratory from Sputum, Expectorated Updated: 04/06/22 1345     AFB CULTURE STAIN No acid fast bacilli seen.    Blood culture x two cultures. Draw prior to antibiotics. [[id number]] Collected: 04/02/22 1851    Order Status: Completed Specimen: Blood from Peripheral, Antecubital, Left Updated: 04/06/22 0612     Blood Culture, Routine No Growth to date      No Growth to date      No Growth to date      No Growth to date    Narrative:      Aerobic and anaerobic    Blood culture [[id number]] Collected: 04/02/22 2105    Order Status: Completed Specimen: Blood Updated: 04/06/22 0612     Blood Culture, Routine No Growth to date      No Growth to date      No Growth to date      No Growth to date    Blood culture x two cultures. Draw prior to antibiotics. [[id number]] Collected: 04/02/22 1854    Order Status: Completed Specimen: Blood from Peripheral, Wrist, Left Updated: 04/06/22 0612     Blood Culture, Routine No Growth to date      No Growth to date      No Growth to date      No Growth to date    Narrative:      Aerobic and anaerobic    Urine culture [[id number]]  (Abnormal)  (Susceptibility) Collected: 04/02/22 2108    Order Status: Completed Specimen: Urine Updated: 04/05/22 0028     Urine Culture, Routine PSEUDOMONAS AERUGINOSA  > 100,000 cfu/ml      Narrative:      Specimen Source->Urine    Blood culture [[id number]]     Order Status: Canceled Specimen: Blood         Latest lactate reviewed, they are-  No results for input(s): LACTATE in the last 72 hours.    Concerned for sepsis 2/2 hypothermia,  tachypnea, and hypotension while in the ED  Source- pyelo     Source control Achieved by- IV Vancomycin and IV Zosyn. She was also given IVFs in the ED.  - de-escalate to zosyn for now, likely further de-escalation tomorrow pending culture

## 2022-04-06 NOTE — ASSESSMENT & PLAN NOTE
Holding Vfend  Na chronically low but date of admission noted at 117  Patient is AAOx3  Fluid restriction ordered  ICU level care for monitoring overnight  - serum osm wnl, suggestive of pseudohyponatremia but unclear what alternative cause would be (normal protein, glucose, and TG; no report of surgical irrigant used during recent lauren replacement)  - repeat test confirm pseudohyponatremia  -peripheral smear; can consider Heme Onc evaluation  - Nephrology consulted, added bicarb tablets  -Na level has improved. Will cont bicarb tabs upon discharge. Follow up with nephrology outpatient.

## 2022-04-06 NOTE — ASSESSMENT & PLAN NOTE
Follows Urology here at [place]  Lauren catheter placed in clinic on day prior to admit  Bladder scan ordered prn  Will need strict I's and O's  Discharge with lauren. Follow up with urology

## 2022-04-06 NOTE — PLAN OF CARE
04/06/22 1230   Final Note   Assessment Type Discharge Planning Assessment   Anticipated Discharge Disposition Home-Health   What phone number can be called within the next 1-3 days to see how you are doing after discharge? [phone]   Hospital Resources/Appts/Education Provided Appointments scheduled and added to AVS   Post-Acute Status   Post-Acute Authorization Home Health   Home Health Status Set-up Complete/Auth obtained  ([facility] )   Discharge Delays None known at this time

## 2022-04-06 NOTE — PLAN OF CARE
The sw spoke to Jadyn at [facility] [phone]/ [phone](fax) and she states she will notify Jazmin the pt's discharging today and the sw has faxed the  orders in [facility]. They will re-admit the pt tomorrow.        04/06/22 1218   Post-Acute Status   Post-Acute Authorization Home Health   Home Health Status Set-up Complete/Auth obtained   Discharge Delays None known at this time   Discharge Plan   Discharge Plan A Home Health

## 2022-04-06 NOTE — DISCHARGE SUMMARY
[facility] Medicine  Discharge Summary      Patient Name: Myesha Quinones  MRN: [id number]  Patient Class: IP- Inpatient  Admission Date: 4/2/2022  Hospital Length of Stay: 4 days  Discharge Date and Time: 4/6/2022  1:52 PM  Attending Physician: Felicia att. providers found   Discharging Provider: Yrn Sheppard MD  Primary Care Provider: Catrachita Rothman MD      HPI:   Ms. Myesha Quinones is a 80 y/o F with a pmh of ischemic cardiomyopathy with pacemaker placement, CHF (EF 45%), diabetes, ABPA, COPD, and asthma. Her PCP is Dr. Catrachita Rothman. She denies illicit drug use, alcohol consumption, and cigarette smoking.     The patient presented to the ED here at [facility] with complaints of generalized weakness.  The patient's daughter states she started Voriconazole this morning for Aspergillus infection and pretty soon afterwards started to feel really weak.  Her fatigue is new since this morning after consuming the Voriconazole per the patient's family who is at the bedside. The family states the fatigue is constant, progressive, and associated with difficulty with ambulation since this am.  The patient typically lives alone and cares for herself per the family.  The patient also reports mild shortness of breath--she is not on home O2.  She denies chest pain, headache, fever, abdominal pain, nausea, vomiting, hematuria, diarrhea, constipation, and black/bloody stools.  She was seen by Urology on yesterday and had a new catheter placed for urinary retention.  They obtained a dipstick and sent for culture but did not start the patient on any antibiotics because they wanted to see the sensitivities per chart review.      Her ED workup includes: hemoglobin 8.5, Na--117, K 6.0>6.1, CO2 16, BUN--72, Cr--2.6, calcium 8.3, albumin, uric acid 7.3, BNP--302, troponin 0.010, lactate 0.7. U/A positive for UTI. Blood cultures and urine cultures are pending. She was given IV Vancomycin and Zosyn in the ED. Duo nebs  were given in the ED. She was also given NS bolus in the ED. Lokelma was ordered to be given in the ED. Procal pending. CXR-- Chronic coarse interstitial prominence, similar to prior.  No large focal consolidation or significant detrimental change. We have consulted Nephrology, pulmonary, and ID while this patient is inpatient. This patient will be admitted to the ICU for [facility]- service under the care of Brayan Frye NP under the collaboration of Dr. Caroline Anderson.      * No surgery found *      Hospital Course:   See individual problem list.       Goals of Care Treatment Preferences:  Code Status: Full Code      Consults:   Consults (From admission, onward)        Status Ordering Provider     Inpatient consult to Pulmonology  Once        Provider:  Tj Freedman MD    Completed BRAYAN FRYE     Inpatient consult to Infectious Diseases  Once        Provider:  Jovon Smart MD    Completed BRAYAN FRYE     Nephrology-[facility]  Once        Provider:  Hayley Morocho MD    Completed BRAYAN FRYE          * Hyponatremia  Holding Vfend  Na chronically low but date of admission noted at 117  Patient is AAOx3  Fluid restriction ordered  ICU level care for monitoring overnight  - serum osm wnl, suggestive of pseudohyponatremia but unclear what alternative cause would be (normal protein, glucose, and TG; no report of surgical irrigant used during recent lauren replacement)  - repeat test confirm pseudohyponatremia  -peripheral smear; can consider Heme Onc evaluation  - Nephrology consulted, added bicarb tablets  -Na level has improved. Will cont bicarb tabs upon discharge. Follow up with nephrology outpatient.    Mycobacterium avium complex  -reportedly respiratory culture also growing MAC  -follow-up with pulmonology as outpatient    Sepsis  This patient does have evidence of infective focus  My overall impression is sepsis. Vital  signs were reviewed and noted in progress note.  Antibiotics given-   Antibiotics (From admission, onward)            None        Cultures were taken-   Microbiology Results (last 7 days)     Procedure Component Value Units Date/Time    AFB Culture & Smear [[id number]] Collected: 04/05/22 0716    Order Status: Completed Specimen: Respiratory from Sputum, Expectorated Updated: 04/06/22 1345     AFB CULTURE STAIN No acid fast bacilli seen.    Blood culture x two cultures. Draw prior to antibiotics. [[id number]] Collected: 04/02/22 1851    Order Status: Completed Specimen: Blood from Peripheral, Antecubital, Left Updated: 04/06/22 0612     Blood Culture, Routine No Growth to date      No Growth to date      No Growth to date      No Growth to date    Narrative:      Aerobic and anaerobic    Blood culture [[id number]] Collected: 04/02/22 2105    Order Status: Completed Specimen: Blood Updated: 04/06/22 0612     Blood Culture, Routine No Growth to date      No Growth to date      No Growth to date      No Growth to date    Blood culture x two cultures. Draw prior to antibiotics. [[id number]] Collected: 04/02/22 1854    Order Status: Completed Specimen: Blood from Peripheral, Wrist, Left Updated: 04/06/22 0612     Blood Culture, Routine No Growth to date      No Growth to date      No Growth to date      No Growth to date    Narrative:      Aerobic and anaerobic    Urine culture [[id number]]  (Abnormal)  (Susceptibility) Collected: 04/02/22 2108    Order Status: Completed Specimen: Urine Updated: 04/05/22 0028     Urine Culture, Routine PSEUDOMONAS AERUGINOSA  > 100,000 cfu/ml      Narrative:      Specimen Source->Urine    Blood culture [[id number]]     Order Status: Canceled Specimen: Blood         Latest lactate reviewed, they are-  No results for input(s): LACTATE in the last 72 hours.    Concerned for sepsis 2/2 hypothermia, tachypnea, and hypotension while in the ED  Source- pyelo     Source control Achieved by- IV  Vancomycin and IV Zosyn. She was also given IVFs in the ED.  - de-escalate to zosyn for now, likely further de-escalation tomorrow pending culture        Acute pyelonephritis  UA dirty with WBC clumps  Cont empiric zosyn  Will follow urine cultures  Completed abx      ABPA (allergic bronchopulmonary aspergillosis)  Chronic bronchitis, unspecified chronic bronchitis type      Follows Dr. Kelvin Maki with pulmonary and Dr. Catia Amaya with ID outpatient. Was seen by Dr. Amaya on 3/29 in clinic and was started on Vfend at that time. Patient stated she began this drug on today and her symptoms started soon afterwards.  Per chart review from IDs notes:  An 81-year-old woman with CAD, HTN, ischemic cardiomyopathy, HFrEF-45-50%, diastolic HF, s/p biventricular ICD, DM2 with polyneuropathy, COPD, and severe persistent asthma who was referred for management of allergic bronchopulmonary aspergillosis (ABPA). Mrs. Quinones has experienced chronic progressive shortness of breath with wheezing and congestion. She feels as though she has mucous production but is unable to expectorate. She has completed a short course of steroids without improvement as well as antibiotics such as doxycyline and Augmentin. She was evaluated by pulmonologist Dr. Maki with work up revealing elevated serum IgE levels, as well as mucous plugging on CT of the chest. Sputum cultures on 3/9/22 are positive for Aspergillus species.     --Consulted Pulmonary and ID while inpatient  --Will hold Vfend until evaluated by ID and pulmonary--cont to watch electrolytes   --She was started on IV Vancomycin and Zosyn empirically by the ED--will cont zosyn for now pending urine cultures  --Follow blood cultures   --CXR--Chronic coarse interstitial prominence, similar to prior.  No large focal consolidation or significant detrimental change  --Cont duo nebs and Spiriva home meds  --Will hold voriconazole and steroids for now and have patient follow up with private  pulmonologist for further mgmt.    Urinary retention  Follows Urology here at [facility]  Lauren catheter placed in clinic on day prior to admit  Bladder scan ordered prn  Will need strict I's and O's  Discharge with lauren. Follow up with urology     Metabolic acidosis  - bicarb  - nephro following      Chronic bronchitis, unspecified chronic bronchitis type        Current mild episode of major depressive disorder without prior episode  Stable.  Calm on exam.      Obstructive sleep apnea  Chronic. continue nightly CPAP      Chronic combined systolic and diastolic heart failure  Follows FAUSTINA Underwood in the cardiology clinic  Last TTE from 2/2022:  · The left ventricle is normal in size with mildly decreased systolic function.  · The estimated ejection fraction is 45-50%. There is left ventricular global hypokinesis.  · Grade II left ventricular diastolic dysfunction.  · Mild mitral regurgitation.  · Normal right ventricular size with normal right ventricular systolic function.  · Mild tricuspid regurgitation.  · The estimated PA systolic pressure is 46 mmHg.  · Normal central venous pressure (3 mmHg).  · There is pulmonary hypertension.  · Severe left atrial enlargement.    SBP range while in the ED in the 90's  - continue home lasix    Type 2 diabetes mellitus with hyperglycemia, with long-term current use of insulin  Hemoglobin A1c 5.9 from 2/22/2022  POCT 4 times daily  Low dose SSI prn  Diabetic/ cardiac diet ordered       Moderate asthma  Cont duo nebs and Spiriva       Hyperlipidemia associated with type 2 diabetes mellitus  Cont pravastatin 40 mg PO daily       CKD (chronic kidney disease) stage 4, GFR 15-29 ml/min  Cr 2.6 which is at baseline  Lauren catheter was placed in the urology clinic for urinary retention  Family reports low urinary output  Strict I's and O's  Daily weights ordered  Bladder scan ordered prn  Nephrology consulted  Avoid nephrotoxic agents  Renally dose meds    Cardiomyopathy,  ischemic  Chronic problem.  TTE from 2/22 reviewed.    Weakness  -patient is frail and weak  -PT/OT consult: HH       Chronic obstructive pulmonary disease with acute exacerbation  Chronic problem  Will cont duo nebs  Not on home O2 per the family  Cont Spiriva daily at discharge, holding while on nebs      Iron deficiency anemia  Hemoglobin 8.5  Hemoglobin 8.7-9.1 on baseline  Follow     Hypertension associated with diabetes  - soft BP on admission but now uptrending  - resume home meds    Biventricular ICD (implantable cardioverter-defibrillator) in place  Chronic. No reported issues with this device.        Final Active Diagnoses:    Diagnosis Date Noted POA    PRINCIPAL PROBLEM:  Hyponatremia [E87.1] 04/02/2022 Yes    Mycobacterium avium complex [A31.0]  Yes    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] 04/02/2022 Yes    Acute pyelonephritis [N10] 04/02/2022 Yes    Sepsis [A41.9] 04/02/2022 Yes    Urinary retention [R33.9] 02/28/2022 Yes    Metabolic acidosis [E87.2] 02/24/2022 Yes    Current mild episode of major depressive disorder without prior episode [F32.0] 01/25/2022 Yes    Chronic bronchitis, unspecified chronic bronchitis type [J42] 01/25/2022 Yes    Obstructive sleep apnea [G47.33] 07/16/2021 Yes    Chronic combined systolic and diastolic heart failure [I50.42] 03/23/2021 Yes    Moderate asthma [J45.909] 10/01/2020 Yes    Hyperlipidemia associated with type 2 diabetes mellitus [E11.69, E78.5] 07/27/2020 Yes    CKD (chronic kidney disease) stage 4, GFR 15-29 ml/min [N18.4] 07/27/2020 Yes    Cardiomyopathy, ischemic [I25.5] 01/21/2019 Yes    Weakness [R53.1]  Yes    Chronic obstructive pulmonary disease with acute exacerbation [J44.1] 04/04/2018 Yes    Type 2 diabetes mellitus with hyperglycemia, with long-term current use of insulin [E11.65, Z79.4] 02/22/2018 Not Applicable    Iron deficiency anemia [D50.9] 05/16/2017 Yes    Hypertension associated with diabetes [E11.59,  I15.2] 07/20/2015 Yes    Biventricular ICD (implantable cardioverter-defibrillator) in place [Z95.810] 01/09/2014 Yes      Problems Resolved During this Admission:    Diagnosis Date Noted Date Resolved POA    Hypothermia [T68.XXXA] 04/02/2022 04/04/2022 Yes       Discharged Condition: good    Disposition: Home or Self Care    Follow Up:   Follow-up Information     [facility] Follow up.    Specialties: Home Health Services, Physical Therapy  Why: [facility]  Contact information:  [address] [place] [address]  [address]  [facility] [address]  [phone]                       Patient Instructions:      Ambulatory referral/consult to Nephrology   Standing Status: Future   Referral Priority: Routine Referral Type: Consultation   Referral Reason: Specialty Services Required   Referred to Provider: MYRA MANZANO Requested Specialty: Nephrology   Number of Visits Requested: 1     Ambulatory referral/consult to Pulmonology   Standing Status: Future   Referral Priority: Routine Referral Type: Consultation   Referral Reason: Specialty Services Required   Requested Specialty: Pulmonary Disease   Number of Visits Requested: 1     Ambulatory referral/consult to Urology   Standing Status: Future   Referral Priority: Routine Referral Type: Consultation   Referral Reason: Specialty Services Required   Requested Specialty: Urology   Number of Visits Requested: 1     Diet diabetic     Notify your health care provider if you experience any of the following:  temperature >100.4     Notify your health care provider if you experience any of the following:  persistent nausea and vomiting or diarrhea     Notify your health care provider if you experience any of the following:  persistent dizziness, light-headedness, or visual disturbances     Notify your health care provider if you experience any of the following:  increased confusion or weakness     Activity as tolerated       Significant Diagnostic Studies: Labs: All  labs within the past 24 hours have been reviewed    Pending Diagnostic Studies:     None         Medications:  Reconciled Home Medications:      Medication List      START taking these medications    ciprofloxacin HCl 500 MG tablet  Commonly known as: CIPRO  Take 1 tablet (500 mg total) by mouth every 12 (twelve) hours. for 6 days     sodium bicarbonate 650 MG tablet  Take 2 tablets (1,300 mg total) by mouth 3 (three) times daily.        CONTINUE taking these medications    ACCU-CHEK HECTOR Strp  Generic drug: blood sugar diagnostic  Uses Accu-Check Hector meter to test BG 4x/day     albuterol 90 mcg/actuation inhaler  Commonly known as: PROVENTIL/VENTOLIN HFA  INHALE 2 PUFFS INTO THE LUNGS EVERY 6 (SIX) HOURS AS NEEDED FOR WHEEZING. RESCUE     albuterol-ipratropium 2.5 mg-0.5 mg/3 mL nebulizer solution  Commonly known as: DUO-NEB  TAKE 3 MLS BY NEBULIZATION EVERY 4 (FOUR) HOURS AS NEEDED FOR WHEEZING.     ascorbic acid (vitamin C) 100 MG tablet  Commonly known as: VITAMIN C  Take 100 mg by mouth once daily.     B-12 DOTS ORAL  Take 1 tablet by mouth once daily.     benzonatate 100 MG capsule  Commonly known as: TESSALON  Take 1 capsule (100 mg total) by mouth 3 (three) times daily as needed for Cough.     carvediloL 25 MG tablet  Commonly known as: COREG  TAKE 2 TABLETS (50 MG TOTAL) BY MOUTH 2 (TWO) TIMES DAILY.     cholecalciferol (vitamin D3) 50 mcg (2,000 unit) Tab  Commonly known as: VITAMIN D3  Take 1 tablet by mouth once daily.     cloNIDine 0.1 mg/24 hr td ptwk 0.1 mg/24 hr  Commonly known as: CATAPRES  Place 1 patch onto the skin every Tuesday.     diltiaZEM 240 MG 24 hr capsule  Commonly known as: CARDIZEM CD  Take 1 capsule (240 mg total) by mouth once daily.     fluticasone propionate 50 mcg/actuation nasal spray  Commonly known as: FLONASE  2 sprays (100 mcg total) by Each Nostril route once daily.     fluticasone-salmeterol 250-50 mcg/dose 250-50 mcg/dose diskus inhaler  Commonly known as:  "ADVAIR  Inhale 1 puff into the lungs 2 (two) times daily. Controller     furosemide 20 MG tablet  Commonly known as: LASIX  Take 1 tablet (20 mg total) by mouth once daily.     hydrALAZINE 100 MG tablet  Commonly known as: APRESOLINE  Take 0.5 tablets (50 mg total) by mouth 2 (two) times daily.     irbesartan 300 MG tablet  Commonly known as: AVAPRO  Take 1 tablet (300 mg total) by mouth every evening.     lancets Misc  Commonly known as: ACCU-CHEK SOFTCLIX LANCETS  Uses Accu-Chek Angelica meter to test BG 1x/day     LANTUS SOLOSTAR U-100 INSULIN glargine 100 units/mL (3mL) SubQ pen  Generic drug: insulin  Inject into the skin.     levocetirizine 5 MG tablet  Commonly known as: XYZAL  Take 5 mg by mouth every evening.     magnesium oxide 400 mg (241.3 mg magnesium) tablet  Commonly known as: MAG-OX  Take 1 tablet (400 mg total) by mouth once daily.     montelukast 10 mg tablet  Commonly known as: SINGULAIR  Take 1 tablet (10 mg total) by mouth every evening.     nitroGLYCERIN 0.4 MG SL tablet  Commonly known as: NITROSTAT  Place 0.4 mg under the tongue every 5 (five) minutes as needed for Chest pain.     omega-3 fatty acids 500 mg Cap  Take 1 capsule by mouth Twice daily.     pen needle, diabetic 31 gauge x 3/16" Ndle  Commonly known as: BD ULTRA-FINE MINI PEN NEEDLE  USE WITH LANTUS AT BEDTIME     polyethylene glycol 17 gram Pwpk  Commonly known as: GLYCOLAX  Take 17 g by mouth daily as needed (constipation).     pravastatin 40 MG tablet  Commonly known as: PRAVACHOL  Take 1 tablet (40 mg total) by mouth once daily.     pulse oximeter device  Commonly known as: pulse oximeter  by Apply Externally route 2 (two) times a day. Use twice daily at 8 AM and 3 PM and record the value in [facility] as directed.     sodium chloride 3% 3 % nebulizer solution  TAKE 4 MLS BY NEBULIZATION 4 (FOUR) TIMES DAILY AS NEEDED FOR OTHER OR COUGH (TO INDUCE SPUTUM AND CLEAR MUCOUS.).     tiotropium 18 mcg inhalation capsule  Commonly known as: " SPIRIVA  Inhale 1 capsule (18 mcg total) into the lungs once daily. Controller        STOP taking these medications    voriconazole 200 MG Tab  Commonly known as: VFEND            Indwelling Lines/Drains at time of discharge:   Lines/Drains/Airways     Drain  Duration                Urethral Catheter 03/01/22 0000 16 Fr. 36 days         Urethral Catheter 04/03/22 0149 3 days                Time spent on the discharge of patient: 35 minutes         Yrn Sheppard MD  Department of Hospital Medicine  [facility] Surg

## 2022-04-06 NOTE — PROGRESS NOTES
Pharmacist Renal Dose Adjustment Note    Myesha Quinones is a 81 y.o. female being treated with the medication ciprofloxacin    Patient Data:    Vital Signs (Most Recent):  Temp: 97.9 °F (36.6 °C) (04/06/22 1148)  Pulse: 83 (04/06/22 1206)  Resp: 20 (04/06/22 1206)  BP: 133/60 (04/06/22 1148)  SpO2: (!) 92 % (04/06/22 1206)   Vital Signs (72h Range):  Temp:  [97.4 °F (36.3 °C)-100.6 °F (38.1 °C)]   Pulse:  []   Resp:  [14-36]   BP: (133-209)/()   SpO2:  [89 %-100 %]      Recent Labs   Lab 04/04/22  0301 04/05/22  0442 04/06/22  1136   CREATININE 2.5* 2.4* 2.1*     Serum creatinine: 2.1 mg/dL (H) 04/06/22 1136  Estimated creatinine clearance: 18.9 mL/min (A)    Medication:ciprofloxacin dose: 500mg frequency q 12 hours will be changed to medication:ciprofloxacin dose:500mg frequency:q 24 hours    Pharmacist's Name: Karolina Davenport  Pharmacist's Extension: [phone]

## 2022-04-06 NOTE — ASSESSMENT & PLAN NOTE
This patient does have evidence of infective focus  My overall impression is sepsis. Vital signs were reviewed and noted in progress note.  Antibiotics given-   Antibiotics (From admission, onward)            None        Cultures were taken-   Microbiology Results (last 7 days)     Procedure Component Value Units Date/Time    AFB Culture & Smear [[id number]] Collected: 04/05/22 0716    Order Status: Completed Specimen: Respiratory from Sputum, Expectorated Updated: 04/06/22 1345     AFB CULTURE STAIN No acid fast bacilli seen.    Blood culture x two cultures. Draw prior to antibiotics. [[id number]] Collected: 04/02/22 1851    Order Status: Completed Specimen: Blood from Peripheral, Antecubital, Left Updated: 04/06/22 0612     Blood Culture, Routine No Growth to date      No Growth to date      No Growth to date      No Growth to date    Narrative:      Aerobic and anaerobic    Blood culture [[id number]] Collected: 04/02/22 2105    Order Status: Completed Specimen: Blood Updated: 04/06/22 0612     Blood Culture, Routine No Growth to date      No Growth to date      No Growth to date      No Growth to date    Blood culture x two cultures. Draw prior to antibiotics. [[id number]] Collected: 04/02/22 1854    Order Status: Completed Specimen: Blood from Peripheral, Wrist, Left Updated: 04/06/22 0612     Blood Culture, Routine No Growth to date      No Growth to date      No Growth to date      No Growth to date    Narrative:      Aerobic and anaerobic    Urine culture [[id number]]  (Abnormal)  (Susceptibility) Collected: 04/02/22 2108    Order Status: Completed Specimen: Urine Updated: 04/05/22 0028     Urine Culture, Routine PSEUDOMONAS AERUGINOSA  > 100,000 cfu/ml      Narrative:      Specimen Source->Urine    Blood culture [[id number]]     Order Status: Canceled Specimen: Blood         Latest lactate reviewed, they are-  No results for input(s): LACTATE in the last 72 hours.    Concerned for sepsis 2/2 hypothermia,  tachypnea, and hypotension while in the ED  Source- pyelo     Source control Achieved by- IV Vancomycin and IV Zosyn. She was also given IVFs in the ED.  - de-escalate to zosyn for now, likely further de-escalation tomorrow pending culture

## 2022-04-06 NOTE — PROGRESS NOTES
Nephrology Progress Note       Consult Requested By: Yrn Sheppard MD  Reason for Consult: VIRGILIO     SUBJECTIVE:    ?    Review of Systems   Constitutional: Negative for chills and fever.   HENT: Negative for congestion and sore throat.    Eyes: Negative for blurred vision, double vision and photophobia.   Respiratory: Negative for cough and shortness of breath.    Cardiovascular: Negative for chest pain, palpitations and leg swelling.   Gastrointestinal: Negative for abdominal pain, diarrhea, nausea and vomiting.   Genitourinary: Negative for dysuria and urgency.   Musculoskeletal: Negative for joint pain and myalgias.   Skin: Negative for itching and rash.   Neurological: Positive for weakness (chronic ). Negative for dizziness, sensory change and headaches.   Endo/Heme/Allergies: Negative for polydipsia. Does not bruise/bleed easily.   Psychiatric/Behavioral: Negative for depression.       Past Medical History:   Diagnosis Date    Acute hypoxemic respiratory failure 12/19/2019    Acute right-sided thoracic back pain 12/09/2019    Allergy     Asthma     Basal cell carcinoma     left forehead    Basal cell carcinoma     left nose    Basal cell carcinoma 05/20/2015    right nose    Basal cell carcinoma 12/22/2015    left lower post neck    Basal cell carcinoma 12/03/2019    left ant scalp     Breast cancer     CAD (coronary artery disease)     Cardiomyopathy     Cardiomyopathy, ischemic     Cataract     CHF (congestive heart failure)     Chronic kidney disease, stage 3, mod decreased GFR 02/14/2017    Colon polyp 2011    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, with long-term current use of insulin 02/22/2018    COPD (chronic obstructive pulmonary disease)     COPD exacerbation 04/08/2018    Current mild episode of major depressive disorder without prior episode 01/25/2022    Defibrillator discharge     Diabetes  mellitus     Diabetes mellitus type II     Diabetes with neurologic complications     Goiter     MNG    HX: breast cancer     Hyperlipidemia     Hypertension     Iron deficiency anemia 05/16/2017    Left kidney mass     Meningioma     Microalbuminuria due to type 2 diabetes mellitus 01/26/2022    Osteoporosis, postmenopausal     Pneumonia 12/08/2019    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    Proteinuria 1/21/2019    Pseudomonas pneumonia     Skin cancer     s/p excision    Sleep apnea     CPAP    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/2015    mid forehead    Unspecified vitamin D deficiency     UTI (urinary tract infection) 4/2/2022    Ventricular tachycardia     Vitamin B12 deficiency     Vitamin D deficiency disease          OBJECTIVE:     Vital Signs (Most Recent)  Vitals:    04/06/22 0400 04/06/22 0516 04/06/22 0743 04/06/22 0848   BP:  (!) 170/74  (!) 141/64   Patient Position:  Lying     Pulse: 84 94 97 97   Resp:  18 20 14   Temp:  98 °F (36.7 °C)  98.1 °F (36.7 °C)   TempSrc:  Oral     SpO2:  99% 99%    Weight:       Height:                     Medications:   albuterol-ipratropium  3 mL Nebulization Q4H WAKE    amLODIPine  5 mg Oral Daily    carvediloL  50 mg Oral BID WM    ciprofloxacin HCl  500 mg Oral Q12H    fluticasone furoate-vilanteroL  1 puff Inhalation Daily    heparin (porcine)  5,000 Units Subcutaneous Q8H    hydrALAZINE  100 mg Oral Q8H    losartan  100 mg Oral Daily    mupirocin   Nasal BID    pravastatin  40 mg Oral Daily    senna-docusate 8.6-50 mg  1 tablet Oral BID    sodium bicarbonate  1,300 mg Oral TID    sodium chloride 0.9%  10 mL Intravenous Q8H           Physical Exam  Vitals and nursing note reviewed.   Constitutional:       General: She is not in acute distress.     Appearance: She is ill-appearing. She is not diaphoretic.   HENT:      Head: Normocephalic and atraumatic.      Mouth/Throat:      Pharynx: No oropharyngeal exudate.   Eyes:       General: No scleral icterus.     Conjunctiva/sclera: Conjunctivae normal.      Pupils: Pupils are equal, round, and reactive to light.   Cardiovascular:      Rate and Rhythm: Normal rate and regular rhythm.      Heart sounds: Normal heart sounds. No murmur heard.  Pulmonary:      Effort: Pulmonary effort is normal. No respiratory distress.      Breath sounds: Rales (RLL) present.   Abdominal:      General: Bowel sounds are normal. There is no distension.      Palpations: Abdomen is soft.      Tenderness: There is no abdominal tenderness.   Musculoskeletal:         General: Swelling present. Normal range of motion.      Cervical back: Normal range of motion and neck supple.      Right lower leg: Edema present.      Left lower leg: Edema present.   Skin:     General: Skin is warm and dry.      Findings: No erythema.   Neurological:      Mental Status: She is alert and oriented to person, place, and time.      Cranial Nerves: No cranial nerve deficit.   Psychiatric:         Mood and Affect: Affect normal.         Cognition and Memory: Memory normal.         Judgment: Judgment normal.         Laboratory:  Recent Labs   Lab 04/04/22  0301 04/04/22  1343 04/05/22  0442   WBC 11.27 10.53 11.64   HGB 9.0* 8.9* 8.6*   HCT 26.8* 26.6* 26.8*    253 268   MONO 7.2  0.8 7.1  0.8 7.8  0.9     Recent Labs   Lab 04/03/22  1945 04/04/22  0301 04/05/22  0442   * 129* 134*   K 4.8 4.5 3.8   CL 96 99 100   CO2 19* 20* 21*   BUN 63* 59* 53*   CREATININE 2.7* 2.5* 2.4*   CALCIUM 8.4* 8.7 8.4*   PHOS 4.8* 5.0* 4.6*       Diagnostic Results:  X-Ray: Reviewed  US: Reviewed  Echo: Reviewed  ASSESSMENT/PLAN:     1. VIRGILIO - on CKD4?5 follows at [facility] though due to DM/HTN has proteinuria nephrotic range.    -- Cr ?  baseline 2.6 had multiple admission and VIRGILIO - stable 2.6-2.5 at baseline   -- Cr today 2.1  doing well pending discharge  -- follow up in clinic after discharge wants to follow here at [place] will arrange      -- Daily  Renal Function Panel  -- Avoid Hypotension.  -- Renally dose all meds        Hyponatremia  -   pseudo Serum osm - wnr x3  Not clear etiology at this point -? multifactorial,   Sepsis,   -- However Serum osm - wnr doesn't fit, has no symptoms as of now   -- Bicarb will help with acidosis      Hypokalemia   -- replace PO        2. HTN (I10) - elevated but in concern for Sepsis monitor for now   -- resume lasix first coreg and ARB   3. Anemia of chronic kidney disease   -- check iron levels   Recent Labs   Lab 04/04/22  0301 04/04/22  1343 04/05/22  0442   HGB 9.0* 8.9* 8.6*   HCT 26.8* 26.6* 26.8*    253 268       Iron   Lab Results   Component Value Date    IRON 41 10/21/2020    TIBC 425 10/21/2020    FERRITIN 95 12/12/2020       4. MBD (E88.9 M90.80) -  Recent Labs   Lab 04/05/22  0442   CALCIUM 8.4*   PHOS 4.6*     Recent Labs   Lab 04/03/22  0334 04/04/22  0301 04/05/22  0442   MG 2.0 2.0 1.7     Acidosis - improving   Lab Results   Component Value Date    PTH 72.0 03/26/2019    CALCIUM 8.4 (L) 04/05/2022    PHOS 4.6 (H) 04/05/2022     Lab Results   Component Value Date    SGNCHZDD56JM 31 01/25/2022       Lab Results   Component Value Date    CO2 21 (L) 04/05/2022   -- PO bicarbonate     5. Nutrition/Hypoalbuminemia (E88.09) -    Recent Labs   Lab 04/03/22  1945 04/04/22  0301   ALBUMIN 2.5* 2.6*     Nepro with meals TID. Renal vitamins daily        Thank you for the consult, will follow  With any question please call [phone]  Anna Fairbanks MD    [facility]  YONG Austin MD, FACESTRELLA DE SOUZA MD,   MD DAYANNA Isabel MD E. V. Harmon, NP    [address]  [place], [address] [phone]

## 2022-04-06 NOTE — NURSING
AVS packet reviewed with patient and daughter at bedside. All medications explained thoroughly. Care plan and education resolved. All questions answered, no further questions at this time. Transport requested.

## 2022-04-06 NOTE — PROGRESS NOTES
[facility] Medicine  Progress Note    Patient Name: Myesha Quinones  MRN: [id number]  Patient Class: IP- Inpatient   Admission Date: 4/2/2022  Length of Stay: 4 days  Attending Physician: No att. providers found  Primary Care Provider: Catrachita Rothman MD        Subjective:     Principal Problem:Hyponatremia        HPI:  Ms. Myesha Quinones is a 82 y/o F with a pmh of ischemic cardiomyopathy with pacemaker placement, CHF (EF 45%), diabetes, ABPA, COPD, and asthma. Her PCP is Dr. Catrachita Rothman. She denies illicit drug use, alcohol consumption, and cigarette smoking.     The patient presented to the ED here at [facility] with complaints of generalized weakness.  The patient's daughter states she started Voriconazole this morning for Aspergillus infection and pretty soon afterwards started to feel really weak.  Her fatigue is new since this morning after consuming the Voriconazole per the patient's family who is at the bedside. The family states the fatigue is constant, progressive, and associated with difficulty with ambulation since this am.  The patient typically lives alone and cares for herself per the family.  The patient also reports mild shortness of breath--she is not on home O2.  She denies chest pain, headache, fever, abdominal pain, nausea, vomiting, hematuria, diarrhea, constipation, and black/bloody stools.  She was seen by Urology on yesterday and had a new catheter placed for urinary retention.  They obtained a dipstick and sent for culture but did not start the patient on any antibiotics because they wanted to see the sensitivities per chart review.      Her ED workup includes: hemoglobin 8.5, Na--117, K 6.0>6.1, CO2 16, BUN--72, Cr--2.6, calcium 8.3, albumin, uric acid 7.3, BNP--302, troponin 0.010, lactate 0.7. U/A positive for UTI. Blood cultures and urine cultures are pending. She was given IV Vancomycin and Zosyn in the ED. Duo nebs were given in the ED. She was also given NS  bolus in the ED. Lokelma was ordered to be given in the ED. Procal pending. CXR-- Chronic coarse interstitial prominence, similar to prior.  No large focal consolidation or significant detrimental change. We have consulted Nephrology, pulmonary, and ID while this patient is inpatient. This patient will be admitted to the ICU for [place]- service under the care of Brayan Frye NP under the collaboration of Dr. Caroline Anderson.      Overview/Hospital Course:  See individual problem list.      Interval History:  Still c/o generalized weakness, although improved. Pt and daughter feel pt isn't ready for discharge yet.    Review of Systems   Constitutional:  Positive for appetite change and fatigue.   Respiratory:  Positive for cough and shortness of breath.    Cardiovascular:  Negative for chest pain.   Gastrointestinal:  Negative for abdominal pain and diarrhea.   Neurological:  Positive for weakness.   Objective:     Vital Signs (Most Recent):  Temp: 97.9 °F (36.6 °C) (04/06/22 1148)  Pulse: 83 (04/06/22 1206)  Resp: 20 (04/06/22 1206)  BP: 133/60 (04/06/22 1148)  SpO2: (!) 92 % (04/06/22 1206)   Vital Signs (24h Range):  Temp:  [97.9 °F (36.6 °C)-98.2 °F (36.8 °C)] 97.9 °F (36.6 °C)  Pulse:  [78-97] 83  Resp:  [14-20] 20  SpO2:  [92 %-100 %] 92 %  BP: (133-170)/(60-74) 133/60     Weight: 66.7 kg (147 lb 0.8 oz)  Body mass index is 26.05 kg/m².    Intake/Output Summary (Last 24 hours) at 4/6/2022 1623  Last data filed at 4/6/2022 0800  Gross per 24 hour   Intake 440 ml   Output 1100 ml   Net -660 ml        Physical Exam  Vitals and nursing note reviewed.   Constitutional:       Appearance: She is obese. She is ill-appearing.   HENT:      Head: Normocephalic and atraumatic.      Nose: Nose normal.      Mouth/Throat:      Mouth: Mucous membranes are moist.   Eyes:      Extraocular Movements: Extraocular movements intact.      Conjunctiva/sclera: Conjunctivae normal.      Pupils: Pupils are equal, round, and reactive  to light.   Cardiovascular:      Rate and Rhythm: Regular rhythm. Bradycardia present.      Pulses: Normal pulses.      Heart sounds: Normal heart sounds. No murmur heard.  Pulmonary:      Effort: Pulmonary effort is normal.      Breath sounds: Rhonchi present. No wheezing.   Abdominal:      General: Bowel sounds are normal. There is no distension.      Palpations: Abdomen is soft.      Tenderness: There is no abdominal tenderness. There is no guarding.   Musculoskeletal:         General: Swelling present.      Cervical back: Normal range of motion and neck supple.      Right lower leg: Edema present.      Left lower leg: Edema present.   Skin:     General: Skin is warm and dry.      Capillary Refill: Capillary refill takes 2 to 3 seconds.      Coloration: Skin is pale.      Findings: Bruising present. No erythema or rash.   Neurological:      Mental Status: She is alert and oriented to person, place, and time. Mental status is at baseline.   Psychiatric:         Mood and Affect: Mood normal.         Behavior: Behavior normal.         Thought Content: Thought content normal.         Judgment: Judgment normal.       Significant Labs: All pertinent labs within the past 24 hours have been reviewed.    Significant Imaging: I have reviewed all pertinent imaging results/findings within the past 24 hours.      Assessment/Plan:      * Hyponatremia  Holding Vfend  Na chronically low but date of admission noted at 117  Patient is AAOx3  Fluid restriction ordered  ICU level care for monitoring overnight  - serum osm wnl, suggestive of pseudohyponatremia but unclear what alternative cause would be (normal protein, glucose, and TG; no report of surgical irrigant used during recent lauren replacement)  - repeat test confirm pseudohyponatremia  -peripheral smear; can consider Heme Onc evaluation  - Nephrology consulted, added bicarb tablets  -Na level has improved. Will cont bicarb tabs upon discharge. Follow up with nephrology  outpatient.    Mycobacterium avium complex  -reportedly respiratory culture also growing MAC  -follow-up with pulmonology as outpatient    Sepsis  This patient does have evidence of infective focus  My overall impression is sepsis. Vital signs were reviewed and noted in progress note.  Antibiotics given-   Antibiotics (From admission, onward)            None        Cultures were taken-   Microbiology Results (last 7 days)     Procedure Component Value Units Date/Time    AFB Culture & Smear [[id number]] Collected: 04/05/22 0716    Order Status: Completed Specimen: Respiratory from Sputum, Expectorated Updated: 04/06/22 1345     AFB CULTURE STAIN No acid fast bacilli seen.    Blood culture x two cultures. Draw prior to antibiotics. [[id number]] Collected: 04/02/22 1851    Order Status: Completed Specimen: Blood from Peripheral, Antecubital, Left Updated: 04/06/22 0612     Blood Culture, Routine No Growth to date      No Growth to date      No Growth to date      No Growth to date    Narrative:      Aerobic and anaerobic    Blood culture [[id number]] Collected: 04/02/22 2105    Order Status: Completed Specimen: Blood Updated: 04/06/22 0612     Blood Culture, Routine No Growth to date      No Growth to date      No Growth to date      No Growth to date    Blood culture x two cultures. Draw prior to antibiotics. [[id number]] Collected: 04/02/22 1854    Order Status: Completed Specimen: Blood from Peripheral, Wrist, Left Updated: 04/06/22 0612     Blood Culture, Routine No Growth to date      No Growth to date      No Growth to date      No Growth to date    Narrative:      Aerobic and anaerobic    Urine culture [[id number]]  (Abnormal)  (Susceptibility) Collected: 04/02/22 2108    Order Status: Completed Specimen: Urine Updated: 04/05/22 0028     Urine Culture, Routine PSEUDOMONAS AERUGINOSA  > 100,000 cfu/ml      Narrative:      Specimen Source->Urine    Blood culture [[id number]]     Order Status: Canceled Specimen: Blood          Latest lactate reviewed, they are-  No results for input(s): LACTATE in the last 72 hours.    Concerned for sepsis 2/2 hypothermia, tachypnea, and hypotension while in the ED  Source- pyelo     Source control Achieved by- IV Vancomycin and IV Zosyn. She was also given IVFs in the ED.  - de-escalate to zosyn for now, likely further de-escalation tomorrow pending culture        Acute pyelonephritis  UA dirty with WBC clumps  Cont empiric zosyn  Will follow urine cultures  Completed abx      ABPA (allergic bronchopulmonary aspergillosis)  Chronic bronchitis, unspecified chronic bronchitis type      Follows Dr. Kelvin Maki with pulmonary and Dr. Catia Amaya with ID outpatient. Was seen by Dr. Amaya on 3/29 in clinic and was started on Vfend at that time. Patient stated she began this drug on today and her symptoms started soon afterwards.  Per chart review from IDs notes:  An 81-year-old woman with CAD, HTN, ischemic cardiomyopathy, HFrEF-45-50%, diastolic HF, s/p biventricular ICD, DM2 with polyneuropathy, COPD, and severe persistent asthma who was referred for management of allergic bronchopulmonary aspergillosis (ABPA). Mrs. Quinoens has experienced chronic progressive shortness of breath with wheezing and congestion. She feels as though she has mucous production but is unable to expectorate. She has completed a short course of steroids without improvement as well as antibiotics such as doxycyline and Augmentin. She was evaluated by pulmonologist Dr. Maki with work up revealing elevated serum IgE levels, as well as mucous plugging on CT of the chest. Sputum cultures on 3/9/22 are positive for Aspergillus species.     --Consulted Pulmonary and ID while inpatient  --Will hold Vfend until evaluated by ID and pulmonary--cont to watch electrolytes   --She was started on IV Vancomycin and Zosyn empirically by the ED--will cont zosyn for now pending urine cultures  --Follow blood cultures   --CXR--Chronic coarse  interstitial prominence, similar to prior.  No large focal consolidation or significant detrimental change  --Cont duo nebs and Spiriva home meds  --Will hold voriconazole and steroids for now and have patient follow up with private pulmonologist for further mgmt.    Urinary retention  Follows Urology here at [place]  Lauren catheter placed in clinic on day prior to admit  Bladder scan ordered prn  Will need strict I's and O's  Discharge with lauren. Follow up with urology     Metabolic acidosis  - bicarb  - nephro following      Chronic bronchitis, unspecified chronic bronchitis type        Current mild episode of major depressive disorder without prior episode  Stable.  Calm on exam.      Obstructive sleep apnea  Chronic. continue nightly CPAP      Chronic combined systolic and diastolic heart failure  Follows FAUSTINA Underwood in the cardiology clinic  Last TTE from 2/2022:  · The left ventricle is normal in size with mildly decreased systolic function.  · The estimated ejection fraction is 45-50%. There is left ventricular global hypokinesis.  · Grade II left ventricular diastolic dysfunction.  · Mild mitral regurgitation.  · Normal right ventricular size with normal right ventricular systolic function.  · Mild tricuspid regurgitation.  · The estimated PA systolic pressure is 46 mmHg.  · Normal central venous pressure (3 mmHg).  · There is pulmonary hypertension.  · Severe left atrial enlargement.    SBP range while in the ED in the 90's  - continue home lasix    Type 2 diabetes mellitus with hyperglycemia, with long-term current use of insulin  Hemoglobin A1c 5.9 from 2/22/2022  POCT 4 times daily  Low dose SSI prn  Diabetic/ cardiac diet ordered       Moderate asthma  Cont duo nebs and Spiriva       Hyperlipidemia associated with type 2 diabetes mellitus  Cont pravastatin 40 mg PO daily       CKD (chronic kidney disease) stage 4, GFR 15-29 ml/min  Cr 2.6 which is at baseline  Lauren catheter was placed in the urology  clinic for urinary retention  Family reports low urinary output  Strict I's and O's  Daily weights ordered  Bladder scan ordered prn  Nephrology consulted  Avoid nephrotoxic agents  Renally dose meds    Cardiomyopathy, ischemic  Chronic problem.  TTE from 2/22 reviewed.    Weakness  -patient is frail and weak  -PT/OT consult: HH       Chronic obstructive pulmonary disease with acute exacerbation  Chronic problem  Will cont duo nebs  Not on home O2 per the family  Cont Spiriva daily at discharge, holding while on nebs      Iron deficiency anemia  Hemoglobin 8.5  Hemoglobin 8.7-9.1 on baseline  Follow     Hypertension associated with diabetes  - soft BP on admission but now uptrending  - resume home meds    Biventricular ICD (implantable cardioverter-defibrillator) in place  Chronic. No reported issues with this device.        VTE Risk Mitigation (From admission, onward)         Ordered     IP VTE HIGH RISK PATIENT  Once         04/02/22 1953                Discharge Planning   ALIX: 4/6/2022     Code Status: Prior   Is the patient medically ready for discharge?:     Reason for patient still in hospital (select all that apply): Patient trending condition, Laboratory test and Pending disposition  Discharge Plan A: Home Health   Discharge Delays: None known at this time              Yrn Sheppard MD  Department of Hospital Medicine   [place] [facility]

## 2022-04-06 NOTE — ASSESSMENT & PLAN NOTE
Chronic bronchitis, unspecified chronic bronchitis type      Follows Dr. Kelvin Maki with pulmonary and Dr. Catia Amaya with ID outpatient. Was seen by Dr. Amaya on 3/29 in clinic and was started on Vfend at that time. Patient stated she began this drug on today and her symptoms started soon afterwards.  Per chart review from IDs notes:  An 81-year-old woman with CAD, HTN, ischemic cardiomyopathy, HFrEF-45-50%, diastolic HF, s/p biventricular ICD, DM2 with polyneuropathy, COPD, and severe persistent asthma who was referred for management of allergic bronchopulmonary aspergillosis (ABPA). Mrs. Quinones has experienced chronic progressive shortness of breath with wheezing and congestion. She feels as though she has mucous production but is unable to expectorate. She has completed a short course of steroids without improvement as well as antibiotics such as doxycyline and Augmentin. She was evaluated by pulmonologist Dr. Maki with work up revealing elevated serum IgE levels, as well as mucous plugging on CT of the chest. Sputum cultures on 3/9/22 are positive for Aspergillus species.     --Consulted Pulmonary and ID while inpatient  --Will hold Vfend until evaluated by ID and pulmonary--cont to watch electrolytes   --She was started on IV Vancomycin and Zosyn empirically by the ED--will cont zosyn for now pending urine cultures  --Follow blood cultures   --CXR--Chronic coarse interstitial prominence, similar to prior.  No large focal consolidation or significant detrimental change  --Cont duo nebs and Spiriva home meds  --Will hold voriconazole and steroids for now and have patient follow up with private pulmonologist for further mgmt.

## 2022-04-06 NOTE — ASSESSMENT & PLAN NOTE
Follows Urology here at [place]  Lauren catheter placed in clinic on day prior to admit  Bladder scan ordered prn  Will need strict I's and O's  Discharge with lauren. Follow up with urology

## 2022-04-07 ENCOUNTER — PATIENT OUTREACH (OUTPATIENT)
Dept: ADMINISTRATIVE | Facility: CLINIC | Age: 82
End: 2022-04-07
Payer: MEDICARE

## 2022-04-08 ENCOUNTER — TELEPHONE (OUTPATIENT)
Dept: UROLOGY | Facility: CLINIC | Age: 82
End: 2022-04-08
Payer: MEDICARE

## 2022-04-08 LAB
BACTERIA BLD CULT: NORMAL

## 2022-04-08 NOTE — TELEPHONE ENCOUNTER
----- Message from Autumn Perez sent at 4/8/2022  3:06 PM CDT -----  Contact: Betsy(Daughter)-[phone]  Type:  Needs Medical Advice    Who Called: Pt's daughter  Reason for call: regarding the pt's Testing   Pharmacy name and phone #:  n/a  Would the patient rather a call back or a response via [facility]? Call back  Best Call Back Number: [phone]

## 2022-04-11 ENCOUNTER — TELEPHONE (OUTPATIENT)
Dept: PULMONOLOGY | Facility: CLINIC | Age: 82
End: 2022-04-11
Payer: MEDICARE

## 2022-04-11 ENCOUNTER — TELEPHONE (OUTPATIENT)
Dept: UROLOGY | Facility: CLINIC | Age: 82
End: 2022-04-11
Payer: MEDICARE

## 2022-04-11 ENCOUNTER — OFFICE VISIT (OUTPATIENT)
Dept: CARDIOLOGY | Facility: CLINIC | Age: 82
End: 2022-04-11
Payer: MEDICARE

## 2022-04-11 ENCOUNTER — LAB VISIT (OUTPATIENT)
Dept: LAB | Facility: HOSPITAL | Age: 82
End: 2022-04-11
Payer: MEDICARE

## 2022-04-11 VITALS
HEIGHT: 63 IN | HEART RATE: 85 BPM | SYSTOLIC BLOOD PRESSURE: 162 MMHG | DIASTOLIC BLOOD PRESSURE: 70 MMHG | WEIGHT: 138 LBS | BODY MASS INDEX: 24.45 KG/M2 | OXYGEN SATURATION: 94 %

## 2022-04-11 DIAGNOSIS — I44.7 LBBB (LEFT BUNDLE BRANCH BLOCK): ICD-10-CM

## 2022-04-11 DIAGNOSIS — J96.01 ACUTE HYPOXEMIC RESPIRATORY FAILURE: ICD-10-CM

## 2022-04-11 DIAGNOSIS — E11.65 TYPE 2 DIABETES MELLITUS WITH HYPERGLYCEMIA, WITH LONG-TERM CURRENT USE OF INSULIN: ICD-10-CM

## 2022-04-11 DIAGNOSIS — J42 CHRONIC BRONCHITIS, UNSPECIFIED CHRONIC BRONCHITIS TYPE: ICD-10-CM

## 2022-04-11 DIAGNOSIS — I15.2 HYPERTENSION ASSOCIATED WITH DIABETES: ICD-10-CM

## 2022-04-11 DIAGNOSIS — I50.42 CHRONIC COMBINED SYSTOLIC AND DIASTOLIC HEART FAILURE: Primary | ICD-10-CM

## 2022-04-11 DIAGNOSIS — G47.33 OBSTRUCTIVE SLEEP APNEA: ICD-10-CM

## 2022-04-11 DIAGNOSIS — E87.1 HYPONATREMIA: ICD-10-CM

## 2022-04-11 DIAGNOSIS — I10 WHITE COAT SYNDROME WITH DIAGNOSIS OF HYPERTENSION: ICD-10-CM

## 2022-04-11 DIAGNOSIS — Z95.810 BIVENTRICULAR ICD (IMPLANTABLE CARDIOVERTER-DEFIBRILLATOR) IN PLACE: ICD-10-CM

## 2022-04-11 DIAGNOSIS — E11.59 HYPERTENSION ASSOCIATED WITH DIABETES: ICD-10-CM

## 2022-04-11 DIAGNOSIS — I25.5 CARDIOMYOPATHY, ISCHEMIC: ICD-10-CM

## 2022-04-11 DIAGNOSIS — E78.5 HYPERLIPIDEMIA ASSOCIATED WITH TYPE 2 DIABETES MELLITUS: ICD-10-CM

## 2022-04-11 DIAGNOSIS — Z79.4 TYPE 2 DIABETES MELLITUS WITH HYPERGLYCEMIA, WITH LONG-TERM CURRENT USE OF INSULIN: ICD-10-CM

## 2022-04-11 DIAGNOSIS — E11.69 HYPERLIPIDEMIA ASSOCIATED WITH TYPE 2 DIABETES MELLITUS: ICD-10-CM

## 2022-04-11 DIAGNOSIS — N18.4 CKD (CHRONIC KIDNEY DISEASE) STAGE 4, GFR 15-29 ML/MIN: ICD-10-CM

## 2022-04-11 LAB
ALBUMIN SERPL BCP-MCNC: 2.9 G/DL (ref 3.5–5.2)
ALP SERPL-CCNC: 130 U/L (ref 55–135)
ALT SERPL W/O P-5'-P-CCNC: 26 U/L (ref 10–44)
ANION GAP SERPL CALC-SCNC: 14 MMOL/L (ref 8–16)
AST SERPL-CCNC: 28 U/L (ref 10–40)
BILIRUB SERPL-MCNC: 0.4 MG/DL (ref 0.1–1)
BNP SERPL-MCNC: 848 PG/ML (ref 0–99)
BUN SERPL-MCNC: 68 MG/DL (ref 8–23)
CALCIUM SERPL-MCNC: 8.5 MG/DL (ref 8.7–10.5)
CHLORIDE SERPL-SCNC: 94 MMOL/L (ref 95–110)
CO2 SERPL-SCNC: 27 MMOL/L (ref 23–29)
CREAT SERPL-MCNC: 2.5 MG/DL (ref 0.5–1.4)
EST. GFR  (AFRICAN AMERICAN): 20 ML/MIN/1.73 M^2
EST. GFR  (NON AFRICAN AMERICAN): 17.4 ML/MIN/1.73 M^2
GLUCOSE SERPL-MCNC: 198 MG/DL (ref 70–110)
MAGNESIUM SERPL-MCNC: 1.7 MG/DL (ref 1.6–2.6)
PHOSPHATE SERPL-MCNC: 5.1 MG/DL (ref 2.7–4.5)
POTASSIUM SERPL-SCNC: 4.2 MMOL/L (ref 3.5–5.1)
PROT SERPL-MCNC: 6.4 G/DL (ref 6–8.4)
SODIUM SERPL-SCNC: 135 MMOL/L (ref 136–145)

## 2022-04-11 PROCEDURE — 1159F MED LIST DOCD IN RCRD: CPT | Mod: CPTII,S$GLB,,

## 2022-04-11 PROCEDURE — 3078F DIAST BP <80 MM HG: CPT | Mod: CPTII,S$GLB,,

## 2022-04-11 PROCEDURE — 1101F PR PT FALLS ASSESS DOC 0-1 FALLS W/OUT INJ PAST YR: ICD-10-PCS | Mod: CPTII,S$GLB,,

## 2022-04-11 PROCEDURE — 99214 OFFICE O/P EST MOD 30 MIN: CPT | Mod: S$GLB,,,

## 2022-04-11 PROCEDURE — 1111F PR DISCHARGE MEDS RECONCILED W/ CURRENT OUTPATIENT MED LIST: ICD-10-PCS | Mod: CPTII,S$GLB,,

## 2022-04-11 PROCEDURE — 1101F PT FALLS ASSESS-DOCD LE1/YR: CPT | Mod: CPTII,S$GLB,,

## 2022-04-11 PROCEDURE — 3078F PR MOST RECENT DIASTOLIC BLOOD PRESSURE < 80 MM HG: ICD-10-PCS | Mod: CPTII,S$GLB,,

## 2022-04-11 PROCEDURE — 1111F DSCHRG MED/CURRENT MED MERGE: CPT | Mod: CPTII,S$GLB,,

## 2022-04-11 PROCEDURE — 1160F RVW MEDS BY RX/DR IN RCRD: CPT | Mod: CPTII,S$GLB,,

## 2022-04-11 PROCEDURE — 1160F PR REVIEW ALL MEDS BY PRESCRIBER/CLIN PHARMACIST DOCUMENTED: ICD-10-PCS | Mod: CPTII,S$GLB,,

## 2022-04-11 PROCEDURE — 83880 ASSAY OF NATRIURETIC PEPTIDE: CPT

## 2022-04-11 PROCEDURE — 83735 ASSAY OF MAGNESIUM: CPT

## 2022-04-11 PROCEDURE — 1126F PR PAIN SEVERITY QUANTIFIED, NO PAIN PRESENT: ICD-10-PCS | Mod: CPTII,S$GLB,,

## 2022-04-11 PROCEDURE — 3077F SYST BP >= 140 MM HG: CPT | Mod: CPTII,S$GLB,,

## 2022-04-11 PROCEDURE — 1159F PR MEDICATION LIST DOCUMENTED IN MEDICAL RECORD: ICD-10-PCS | Mod: CPTII,S$GLB,,

## 2022-04-11 PROCEDURE — 1126F AMNT PAIN NOTED NONE PRSNT: CPT | Mod: CPTII,S$GLB,,

## 2022-04-11 PROCEDURE — 84100 ASSAY OF PHOSPHORUS: CPT

## 2022-04-11 PROCEDURE — 80053 COMPREHEN METABOLIC PANEL: CPT

## 2022-04-11 PROCEDURE — 3288F PR FALLS RISK ASSESSMENT DOCUMENTED: ICD-10-PCS | Mod: CPTII,S$GLB,,

## 2022-04-11 PROCEDURE — 3288F FALL RISK ASSESSMENT DOCD: CPT | Mod: CPTII,S$GLB,,

## 2022-04-11 PROCEDURE — 99999 PR PBB SHADOW E&M-EST. PATIENT-LVL III: ICD-10-PCS | Mod: PBBFAC,,,

## 2022-04-11 PROCEDURE — 99214 PR OFFICE/OUTPT VISIT, EST, LEVL IV, 30-39 MIN: ICD-10-PCS | Mod: S$GLB,,,

## 2022-04-11 PROCEDURE — 99999 PR PBB SHADOW E&M-EST. PATIENT-LVL III: CPT | Mod: PBBFAC,,,

## 2022-04-11 PROCEDURE — 3077F PR MOST RECENT SYSTOLIC BLOOD PRESSURE >= 140 MM HG: ICD-10-PCS | Mod: CPTII,S$GLB,,

## 2022-04-11 NOTE — TELEPHONE ENCOUNTER
----- Message from Ubaldo Ryan sent at 4/11/2022  2:18 PM CDT -----  Type:  Patient Returning Call    Who Called:Betsy (daughter)  Who Left Message for Patient  Does the patient know what this is regarding?:  Would the patient rather a call back or a response via [facility]? Call   Best Call Back Number: [phone]  Additional Information:    Daughter would like a call back   Daughter stated she called on Friday and no has respond

## 2022-04-11 NOTE — TELEPHONE ENCOUNTER
Returned call, patient's daughter, Betsy stated no contact has been made to schedule Urodynamics testing.  Informed message will be sent for further communication.  She voiced understanding.

## 2022-04-11 NOTE — PROGRESS NOTES
[facility] Provider Note (Follow-up) Consult Note      HPI:     Patient can walk from parking garage to clinic today and pace normal before SOB, assisted by rolling walker. Denies worsenig SOB, reports SOB continues to be improved              Patient sleeps on 1 number of pillows with CPAP              Patient wakes up SOB, has to get out of bed, associated cough- denies              Palpitations - denies              Dizzy, light-headed, pre-syncope or syncope- denies pre-syncope/syncope/falls. Reports dizziness with lower BP readings (~110/50s)               Since discharge frequency of performing weights, home weight and weight change- performing daily weights, weight stable 134-135lbs              Other information felt pertinent to HPI: Ms. Myesha Quinones is a 81 y.o. female with a PMHx of CAD, ischemic cardiomyopathy with LBBB s/p biventricular ICD, CHF, CKD4, IDDM, breast cancer, HTN, asthma, COPD, and HLD who presents to third [facility] visit accompanied by son. Mrs. Quinones was admitted to observation for CHF and COPD exacerbation. Started on Lasix IV 20 mg BID and scheduled duonebs, prednisone, and antibiotics. Diuresed well, net neg 2L, however her Cr continues to increase. Suspect over diuresis. Will check urine studies, RP sono unremarkable. Give small IVF bolus. Nephrology consulted for further assistance, agree with over diuresis. Continue fluid restriction and T2DM control. BP continues to be uncontrolled, IMDUR added to regimen. 2/28 patient began having significant urinary retention, bladder scan reveals >1L. Start oxybutynin and monitor, failed voiding trial, lauren placed. HH established with moraima, urology fu.      3/9/22: Today she reports breathing much improved since hospital stay. Still endorses continued cough productive for green sputum, but improved. She was discharged on lasix 20mg daily and has been restricting fluid intake to 2 16oz water bottles/day per nephrology recommendations for  "hyponatremia. Endorses chronic LE edema worsened with keeping feet dependent and improved after elevation.    3/21/22: Today she continues to endorse periodically feeling "spacey". Home BP typically in 120-130s/60-70s range with lower BP readings similar to clinic value today. She reports sensation of feeling "spacey" associated with lower BP readings. Otherwise she denies worsening SOB. She does report mildly worsened swelling since last visit, worsened throughout the day with keeping feet dependent.    4/11/22: Today she has no complaints. Reports sensation of feeling "spacey" improved from last visit. Denies worsening SOB. Endorses mild LE swelling today.      PHYSICAL:   Vitals:    04/11/22 0906   BP: (!) 162/70   Pulse: 85      Wt Readings from Last 3 Encounters:   04/11/22 62.6 kg (138 lb)   04/05/22 66.7 kg (147 lb 0.8 oz)   04/01/22 64 kg (141 lb 1.5 oz)     JVD: no  Heart rhythm: regular  Cardiac murmur: No    S3: no  S4: yes  Lungs: diminished breath sounds throughout  Hepatojugular reflux: yes   Edema: yes, 2+ BLE edema      Lab Results   Component Value Date     (L) 04/11/2022    K 4.2 04/11/2022    MG 1.7 04/11/2022    CL 94 (L) 04/11/2022    CO2 27 04/11/2022    BUN 68 (H) 04/11/2022    CREATININE 2.5 (H) 04/11/2022     (H) 04/11/2022    CALCIUM 8.5 (L) 04/11/2022    AST 28 04/11/2022    ALT 26 04/11/2022    ALBUMIN 2.9 (L) 04/11/2022    PROT 6.4 04/11/2022    BILITOT 0.4 04/11/2022     Lab Results   Component Value Date     (H) 04/11/2022     (H) 04/02/2022     (H) 03/21/2022       ASSESSMENT: Chronic combined systolic and diastolic HF    PLAN:      Patient Instructions:    Instruct the patient to notify this clinic if HH, a physician or an advanced care provider wants to change medication one of their HF medications    Activity and Diet restrictions:   o Recommend 2-3 gram sodium restriction and 1500cc- 2000cc fluid restriction.  o Encourage physical activity with " graded exercise program.  o Requested patient to weigh themselves daily, and to notify us if their weight increases by more than 3 lbs in 1 day or 5 lbs in 3 days.    Assigned dry weight on home scale: 134-135lbs  Medication changes (include current dose and changed dose): NYHA Class I symptoms. Continue lasix 20mg daily. Instructed to take an additional 20mg prn for worsening swelling/couple lb weight gain. Instructed to take additional 20mg lasix for next couple days given increased fluid volume on exam. Recommend periodic leg elevation and wearing compression stockings when keeping feet dependent to prevent pooling of fluid. Stop diltiazem given mildly reduced EF (45-50%) and currently taking coreg for osito blocker therapy. Stop irbesartan and start valsartan for GDMT.    Upcoming labs and date anticipated: Pt completed [facility] program.    Current GDMT:  Coreg 25mg BID  Lasix 20mg daily  Valsartan 80mg BID    Appointment scheduled with Jorge Calderon to establish care.    Li Lott PA-C

## 2022-04-11 NOTE — TELEPHONE ENCOUNTER
Patient's cultures now also growing MAC.  I have notified her by phone and sent a message to infectious disease updating this issue.  She unfortunately recently had to be hospitalized for hyponatremia and hypervolemia.  I offered my sympathy and asked her to call me with any questions or concerns should she have any.  She was very appreciative.  All questions answered.    Kelvin Maki MD  [facility]

## 2022-04-12 ENCOUNTER — TELEPHONE (OUTPATIENT)
Dept: CARDIOLOGY | Facility: CLINIC | Age: 82
End: 2022-04-12
Payer: MEDICARE

## 2022-04-12 ENCOUNTER — TELEPHONE (OUTPATIENT)
Dept: UROLOGY | Facility: CLINIC | Age: 82
End: 2022-04-12
Payer: MEDICARE

## 2022-04-12 DIAGNOSIS — R33.9 URINARY RETENTION: Primary | ICD-10-CM

## 2022-04-12 RX ORDER — VALSARTAN 80 MG/1
80 TABLET ORAL 2 TIMES DAILY
Qty: 180 TABLET | Refills: 3 | Status: SHIPPED | OUTPATIENT
Start: 2022-04-12 | End: 2022-07-21

## 2022-04-12 NOTE — TELEPHONE ENCOUNTER
Spoke with patient daughter and inform her the doctor will put the order in for the urodynamics. Betsy voice her understanding.

## 2022-04-12 NOTE — TELEPHONE ENCOUNTER
----- Message from Steffen Osborn MD sent at 4/12/2022  4:43 PM CDT -----  Regarding: RE: call back  Contact: Pt daughter  Ordered UDS and referral to Dr. Manuel      ----- Message -----  From: Frida Muñoz MA  Sent: 4/12/2022   3:45 PM CDT  To: Steffen Osborn MD  Subject: FW: call back                                    Patient need Urodynamics order.   ----- Message -----  From: Tatiana Mitchell, Patient Care Assistant  Sent: 4/12/2022   3:09 PM CDT  To: Anurag Bourne Staff  Subject: call back                                        Pt daughter is requesting a call back in regards to testing that was supposed to be performed. Pt daughter is asking that someone call her back that the earliest convenience in regards to this matter.      Pt daughter @ [phone]

## 2022-04-12 NOTE — TELEPHONE ENCOUNTER
----- Message from Tatiana Mitchell, Patient Care Assistant sent at 4/12/2022  3:06 PM CDT -----  Regarding: call back  Contact: Pt daughter  Pt daughter is requesting a call back in regards to testing that was supposed to be performed. Pt daughter is asking that someone call her back that the earliest convenience in regards to this matter.      Pt daughter @ [phone]

## 2022-04-12 NOTE — TELEPHONE ENCOUNTER
"Pt called to report low BP yesterday after leaving appt ( 90/50s).  This blood pressure returned to "normal for me" after rest to 150s/60s.  Pt called to report AM BP today at 139/53.  Reviewed pt recent BPs with FAUSTINA Underwood - plan is for pt to stop avapro and start valsartan 80 mg BID.  Plan reviewed with pt and pt verbalizes read back of instructions. Will go to [facility] tomorrow to  and start.  Pt  encouraged to call [facility] navigator at [phone] with any questions problems or concerns.  Pt  verbalized understanding and in agreement of plan.     "

## 2022-04-19 ENCOUNTER — PATIENT OUTREACH (OUTPATIENT)
Dept: ADMINISTRATIVE | Facility: OTHER | Age: 82
End: 2022-04-19
Payer: MEDICARE

## 2022-04-19 NOTE — PROGRESS NOTES
Health Maintenance Due   Topic Date Due    Shingles Vaccine (1 of 2) 05/28/2009    Pneumococcal Vaccines (Age 65+) (3 - PPSV23 or PCV20) 06/05/2018    DEXA Scan  04/30/2020    Foot Exam  11/06/2020     Updates were requested from care everywhere.  Chart was reviewed for overdue Proactive [facility] Encounters ([facility]) topics (CRS, Breast Cancer Screening, Eye exam)  Health Maintenance has been updated.  LINKS immunization registry triggered.  Immunizations were reconciled.

## 2022-04-20 ENCOUNTER — PATIENT MESSAGE (OUTPATIENT)
Dept: INTERNAL MEDICINE | Facility: CLINIC | Age: 82
End: 2022-04-20
Payer: MEDICARE

## 2022-04-26 ENCOUNTER — OFFICE VISIT (OUTPATIENT)
Dept: INTERNAL MEDICINE | Facility: CLINIC | Age: 82
End: 2022-04-26
Payer: MEDICARE

## 2022-04-26 VITALS
OXYGEN SATURATION: 97 % | HEART RATE: 62 BPM | HEIGHT: 63 IN | BODY MASS INDEX: 24.3 KG/M2 | DIASTOLIC BLOOD PRESSURE: 42 MMHG | SYSTOLIC BLOOD PRESSURE: 120 MMHG | WEIGHT: 137.13 LBS

## 2022-04-26 DIAGNOSIS — I10 ESSENTIAL HYPERTENSION: ICD-10-CM

## 2022-04-26 DIAGNOSIS — I15.2 HYPERTENSION ASSOCIATED WITH DIABETES: Primary | ICD-10-CM

## 2022-04-26 DIAGNOSIS — N18.4 CKD (CHRONIC KIDNEY DISEASE) STAGE 4, GFR 15-29 ML/MIN: ICD-10-CM

## 2022-04-26 DIAGNOSIS — E11.21 WELL CONTROLLED TYPE 2 DIABETES MELLITUS WITH NEPHROPATHY: ICD-10-CM

## 2022-04-26 DIAGNOSIS — R33.9 URINARY RETENTION: ICD-10-CM

## 2022-04-26 DIAGNOSIS — R79.89 PSEUDOHYPONATREMIA: ICD-10-CM

## 2022-04-26 DIAGNOSIS — Z86.39 HISTORY OF NON ANEMIC VITAMIN B12 DEFICIENCY: ICD-10-CM

## 2022-04-26 DIAGNOSIS — E11.59 HYPERTENSION ASSOCIATED WITH DIABETES: Primary | ICD-10-CM

## 2022-04-26 DIAGNOSIS — D50.9 IRON DEFICIENCY ANEMIA, UNSPECIFIED IRON DEFICIENCY ANEMIA TYPE: ICD-10-CM

## 2022-04-26 DIAGNOSIS — J45.40 MODERATE PERSISTENT ASTHMA, UNSPECIFIED WHETHER COMPLICATED: ICD-10-CM

## 2022-04-26 DIAGNOSIS — I25.5 CARDIOMYOPATHY, ISCHEMIC: ICD-10-CM

## 2022-04-26 DIAGNOSIS — I50.42 CHRONIC COMBINED SYSTOLIC AND DIASTOLIC HEART FAILURE: ICD-10-CM

## 2022-04-26 DIAGNOSIS — F51.01 PRIMARY INSOMNIA: ICD-10-CM

## 2022-04-26 DIAGNOSIS — M81.0 OSTEOPOROSIS, UNSPECIFIED OSTEOPOROSIS TYPE, UNSPECIFIED PATHOLOGICAL FRACTURE PRESENCE: ICD-10-CM

## 2022-04-26 PROBLEM — J42 CHRONIC BRONCHITIS, UNSPECIFIED CHRONIC BRONCHITIS TYPE: Status: RESOLVED | Noted: 2022-01-25 | Resolved: 2022-04-26

## 2022-04-26 PROBLEM — E11.65 TYPE 2 DIABETES MELLITUS WITH HYPERGLYCEMIA, WITH LONG-TERM CURRENT USE OF INSULIN: Status: RESOLVED | Noted: 2018-02-22 | Resolved: 2022-04-26

## 2022-04-26 PROBLEM — E87.20 METABOLIC ACIDOSIS: Status: RESOLVED | Noted: 2022-02-24 | Resolved: 2022-04-26

## 2022-04-26 PROBLEM — E66.3 OVERWEIGHT (BMI 25.0-29.9): Status: RESOLVED | Noted: 2020-07-27 | Resolved: 2022-04-26

## 2022-04-26 PROBLEM — N10 ACUTE PYELONEPHRITIS: Status: RESOLVED | Noted: 2022-04-02 | Resolved: 2022-04-26

## 2022-04-26 PROBLEM — J96.02 ACUTE RESPIRATORY FAILURE WITH HYPOXIA AND HYPERCARBIA: Status: RESOLVED | Noted: 2019-12-19 | Resolved: 2022-04-26

## 2022-04-26 PROBLEM — A41.9 SEPSIS: Status: RESOLVED | Noted: 2022-04-02 | Resolved: 2022-04-26

## 2022-04-26 PROBLEM — Z79.4 TYPE 2 DIABETES MELLITUS WITH HYPERGLYCEMIA, WITH LONG-TERM CURRENT USE OF INSULIN: Status: RESOLVED | Noted: 2018-02-22 | Resolved: 2022-04-26

## 2022-04-26 PROBLEM — E87.1 HYPONATREMIA: Status: RESOLVED | Noted: 2022-04-02 | Resolved: 2022-04-26

## 2022-04-26 PROBLEM — R05.9 COUGH: Status: RESOLVED | Noted: 2022-03-03 | Resolved: 2022-04-26

## 2022-04-26 PROBLEM — J96.01 ACUTE RESPIRATORY FAILURE WITH HYPOXIA AND HYPERCARBIA: Status: RESOLVED | Noted: 2019-12-19 | Resolved: 2022-04-26

## 2022-04-26 PROBLEM — E11.9 DIET-CONTROLLED DIABETES MELLITUS: Status: ACTIVE | Noted: 2022-04-26

## 2022-04-26 PROCEDURE — 99999 PR PBB SHADOW E&M-EST. PATIENT-LVL III: ICD-10-PCS | Mod: PBBFAC,,, | Performed by: STUDENT IN AN ORGANIZED HEALTH CARE EDUCATION/TRAINING PROGRAM

## 2022-04-26 PROCEDURE — 3078F PR MOST RECENT DIASTOLIC BLOOD PRESSURE < 80 MM HG: ICD-10-PCS | Mod: CPTII,S$GLB,, | Performed by: STUDENT IN AN ORGANIZED HEALTH CARE EDUCATION/TRAINING PROGRAM

## 2022-04-26 PROCEDURE — 1111F DSCHRG MED/CURRENT MED MERGE: CPT | Mod: CPTII,S$GLB,, | Performed by: STUDENT IN AN ORGANIZED HEALTH CARE EDUCATION/TRAINING PROGRAM

## 2022-04-26 PROCEDURE — 1159F PR MEDICATION LIST DOCUMENTED IN MEDICAL RECORD: ICD-10-PCS | Mod: CPTII,S$GLB,, | Performed by: STUDENT IN AN ORGANIZED HEALTH CARE EDUCATION/TRAINING PROGRAM

## 2022-04-26 PROCEDURE — 1160F RVW MEDS BY RX/DR IN RCRD: CPT | Mod: CPTII,S$GLB,, | Performed by: STUDENT IN AN ORGANIZED HEALTH CARE EDUCATION/TRAINING PROGRAM

## 2022-04-26 PROCEDURE — 99214 PR OFFICE/OUTPT VISIT, EST, LEVL IV, 30-39 MIN: ICD-10-PCS | Mod: S$GLB,,, | Performed by: STUDENT IN AN ORGANIZED HEALTH CARE EDUCATION/TRAINING PROGRAM

## 2022-04-26 PROCEDURE — 1160F PR REVIEW ALL MEDS BY PRESCRIBER/CLIN PHARMACIST DOCUMENTED: ICD-10-PCS | Mod: CPTII,S$GLB,, | Performed by: STUDENT IN AN ORGANIZED HEALTH CARE EDUCATION/TRAINING PROGRAM

## 2022-04-26 PROCEDURE — 99214 OFFICE O/P EST MOD 30 MIN: CPT | Mod: S$GLB,,, | Performed by: STUDENT IN AN ORGANIZED HEALTH CARE EDUCATION/TRAINING PROGRAM

## 2022-04-26 PROCEDURE — 3074F SYST BP LT 130 MM HG: CPT | Mod: CPTII,S$GLB,, | Performed by: STUDENT IN AN ORGANIZED HEALTH CARE EDUCATION/TRAINING PROGRAM

## 2022-04-26 PROCEDURE — 99499 RISK ADDL DX/OHS AUDIT: ICD-10-PCS | Mod: S$PBB,,, | Performed by: STUDENT IN AN ORGANIZED HEALTH CARE EDUCATION/TRAINING PROGRAM

## 2022-04-26 PROCEDURE — 3288F PR FALLS RISK ASSESSMENT DOCUMENTED: ICD-10-PCS | Mod: CPTII,S$GLB,, | Performed by: STUDENT IN AN ORGANIZED HEALTH CARE EDUCATION/TRAINING PROGRAM

## 2022-04-26 PROCEDURE — 99999 PR PBB SHADOW E&M-EST. PATIENT-LVL III: CPT | Mod: PBBFAC,,, | Performed by: STUDENT IN AN ORGANIZED HEALTH CARE EDUCATION/TRAINING PROGRAM

## 2022-04-26 PROCEDURE — 1101F PR PT FALLS ASSESS DOC 0-1 FALLS W/OUT INJ PAST YR: ICD-10-PCS | Mod: CPTII,S$GLB,, | Performed by: STUDENT IN AN ORGANIZED HEALTH CARE EDUCATION/TRAINING PROGRAM

## 2022-04-26 PROCEDURE — 3074F PR MOST RECENT SYSTOLIC BLOOD PRESSURE < 130 MM HG: ICD-10-PCS | Mod: CPTII,S$GLB,, | Performed by: STUDENT IN AN ORGANIZED HEALTH CARE EDUCATION/TRAINING PROGRAM

## 2022-04-26 PROCEDURE — 1111F PR DISCHARGE MEDS RECONCILED W/ CURRENT OUTPATIENT MED LIST: ICD-10-PCS | Mod: CPTII,S$GLB,, | Performed by: STUDENT IN AN ORGANIZED HEALTH CARE EDUCATION/TRAINING PROGRAM

## 2022-04-26 PROCEDURE — 1101F PT FALLS ASSESS-DOCD LE1/YR: CPT | Mod: CPTII,S$GLB,, | Performed by: STUDENT IN AN ORGANIZED HEALTH CARE EDUCATION/TRAINING PROGRAM

## 2022-04-26 PROCEDURE — 99499 UNLISTED E&M SERVICE: CPT | Mod: S$PBB,,, | Performed by: STUDENT IN AN ORGANIZED HEALTH CARE EDUCATION/TRAINING PROGRAM

## 2022-04-26 PROCEDURE — 3078F DIAST BP <80 MM HG: CPT | Mod: CPTII,S$GLB,, | Performed by: STUDENT IN AN ORGANIZED HEALTH CARE EDUCATION/TRAINING PROGRAM

## 2022-04-26 PROCEDURE — 1126F PR PAIN SEVERITY QUANTIFIED, NO PAIN PRESENT: ICD-10-PCS | Mod: CPTII,S$GLB,, | Performed by: STUDENT IN AN ORGANIZED HEALTH CARE EDUCATION/TRAINING PROGRAM

## 2022-04-26 PROCEDURE — 3288F FALL RISK ASSESSMENT DOCD: CPT | Mod: CPTII,S$GLB,, | Performed by: STUDENT IN AN ORGANIZED HEALTH CARE EDUCATION/TRAINING PROGRAM

## 2022-04-26 PROCEDURE — 1159F MED LIST DOCD IN RCRD: CPT | Mod: CPTII,S$GLB,, | Performed by: STUDENT IN AN ORGANIZED HEALTH CARE EDUCATION/TRAINING PROGRAM

## 2022-04-26 PROCEDURE — 1126F AMNT PAIN NOTED NONE PRSNT: CPT | Mod: CPTII,S$GLB,, | Performed by: STUDENT IN AN ORGANIZED HEALTH CARE EDUCATION/TRAINING PROGRAM

## 2022-04-26 RX ORDER — SODIUM BICARBONATE 650 MG/1
1300 TABLET ORAL 3 TIMES DAILY
Qty: 180 TABLET | Refills: 11 | Status: SHIPPED | OUTPATIENT
Start: 2022-04-26 | End: 2023-05-15 | Stop reason: SDUPTHER

## 2022-04-26 RX ORDER — HYDRALAZINE HYDROCHLORIDE 100 MG/1
100 TABLET, FILM COATED ORAL 2 TIMES DAILY
Qty: 180 TABLET | Refills: 3
Start: 2022-04-26 | End: 2022-07-21

## 2022-04-26 NOTE — PROGRESS NOTES
INTERNAL MEDICINE ESTABLISHED PATIENT VISIT NOTE        Assessment/Plan     1. Hypertension associated with diabetes  2. Essential hypertension  - hydrALAZINE (APRESOLINE) 100 MG tablet; Take 1 tablet (100 mg total) by mouth 2 (two) times daily.  Dispense: 180 tablet; Refill: 3  3. Cardiomyopathy, ischemic  4. Chronic combined systolic and diastolic heart failure  5. Pseudohyponatremia  6. CKD (chronic kidney disease) stage 4, GFR 15-29 ml/min  - sodium bicarbonate 650 MG tablet; Take 2 tablets (1,300 mg total) by mouth 3 (three) times daily.  Dispense: 180 tablet; Refill: 11  7. History of non anemic vitamin B12 deficiency  - Vitamin B12; Future  8. Osteoporosis, unspecified osteoporosis type, unspecified pathological fracture presence  9. Iron deficiency anemia, unspecified iron deficiency anemia type  10. Moderate persistent asthma, unspecified whether complicated  11. Urinary retention  12. Well controlled diabetes with nephropathy  13. Primary insomnia    All previous labs, imaging, and notes reviewed up to the time point of this note.   Reviewed past 2 admissions for VIRGILIO and hyponatremia. All medications were reviewed in detail. Patient with currently well compensated CHF, urinary retention with leg bag in place, urology appt tomorrow. Diabetes is better controlled and CKD is worsening, followed closely by nephrology.     Discussed that I am leaving [facility] in the coming months and a contingency plan for making follow up appointments were made. Patient lives in [place] and is hoping for a shorter commute.     Health Maintenance reviewed and discussed with patient.   RTC in 3 mo, sooner if needed.    Subjective:     Chief Complaint: Follow-up       Patient ID: Myesha Quinones is a 81 y.o. female who presents with TIIDM, mixed hearing loss, asthma, LBBB s/p ICD placement, non-ischemic cardiomyopathy, HLD, combined heart failure, hx breast cancer, iron deficiency anemia, osteoporosis, KHOA here today for 3  month follow up.      Patient was previously seen by Dr. Gaston. She is here with her daughter, Betsy chance.      Since she was last seen, she has been in the hospital twice (2/21/22 and 4/2/22) and  due to issues with acute renal failure, urinary retention and pseudohyponatremia (abi 117) - started on sodium bicarb tabs and now has a urinary catheter with leg bag in place. Seeing urology, Dr. Manuel tomorrow. Also established with Dr. Borrero in [place] neph[facility] who is adjusting diuretic dose - taking 40-80 mg lasix daily depending on her edema.      Non-ischemic cardiomyopathy with combined CHF and LBBB s/p ICD placement. Sees cardiology every 3 months alternating with internal medicine. Last ECHO 2/22/22 45-50%. Pacemaker is ventricularly pacing 100% of the time. Baseline weight is around 143 lbs. She reports ~25 lbs weight loss in the past year.  Taking: coreg 50 mg BID, hydralazine 100 mg BID, valsartan 80 mg BID.  She is taking lasix 40 mg 1-2 times per day PRN leg swelling. --stopped clonidine 0.1 mg patch, cardizem 240 mg, and irbesartan 300 mg QHS recently by cardiology FAUSTINA Lott     Grief - lost her  Dec 2020 due to COVID. She had a mild case herself.      CKD IV - follows with nephrology, switched recently to Dr. Borrero .LOV 4/18/22 with f/u in 3 weeks. Has had CKD for the past decade, baseline Cr. 2.5. This provider is managing her diuretic.   --recent admission for hyponatremia, Na 135 on 4/11/22.       Asthma - obstructive asthma. Was last seen by pulmonology [facility] on 11/18/2020. Currently taking advair, ventolin rescue inhaler and nebulizer and singulair. She is taking nebulizers rarely now.  We discussed starting on a LAMA and I prescribed tiotropium to take QAM. Needs to f/u with pulm. Has not been taking spiriva.      TIIDM - last A1c 6.6 last check. lantus 22 units QAM. Sees Elidia Madison in endocrinology, stopped tradjenta, but mostly monitoring her osteoporosis and  "adrenal nodule.      Osteoporosis - not wanting to take medications due to concern over SE. Taking calcium and vitamin D, and unable to walk very far due to respiratory issues.      Iron deficiency - previously getting iron infusions, but reports those didn't help. Not on iron supplement.      Hx of breast cancer - ER/MI + breast cancer hx, underwent 4 cycles of chemo with adriamycin and cytoxan, then 4 cycles of taxotere, then tamoxifen for 5 years, then femara for 5 years, completed in 2009. We will stop annual mammogram. Is done seeing hematology, last visit 4/2019. Declines therapy if she were to get repeat cancer.       Past medical history, social history, family history, medications and allergies reviewed.      Review of Systems   Constitutional: Negative for fever and unexpected weight change.   HENT: Negative for sinus pressure and sore throat.    Eyes: Negative for visual disturbance.   Respiratory: Negative for cough and shortness of breath.    Cardiovascular: Negative for chest pain and palpitations.   Gastrointestinal: Negative for constipation, diarrhea, nausea and vomiting.   Endocrine: Negative for polyuria.   Genitourinary: Negative for dysuria and urgency.   Musculoskeletal: Negative for arthralgias and myalgias.   Skin: Negative for rash.   Allergic/Immunologic: Negative for environmental allergies.   Neurological: Negative for dizziness, light-headedness and headaches.   Hematological: Does not bruise/bleed easily.   Psychiatric/Behavioral: Negative for agitation and decreased concentration. The patient is not nervous/anxious.            Objective:   BP (!) 120/42   Pulse 62   Ht 5' 3" (1.6 m)   Wt 62.2 kg (137 lb 2 oz)   LMP  (LMP Unknown)   SpO2 97%   BMI 24.29 kg/m²        General: AAO x3, no apparent distress, appears clinically well   HEENT: PERRL, OP clear  Neck: Supple, no JVD  CV: RRR, no m/r/g  Pulm: Lungs CTAB, no crackles, no wheezes  Abd: s/NT/ND +BS  Extremities: appears warm " and well-perfused, 2+ edema to the knees bilaterally, urinary catheter in place with L side leg bag  Skin: no rashes, lesions, or tears          Catrachita Rothman MD   [facility]  Department of Internal Medicine   04/26/2022

## 2022-04-27 ENCOUNTER — TELEPHONE (OUTPATIENT)
Dept: UROLOGY | Facility: CLINIC | Age: 82
End: 2022-04-27
Payer: MEDICARE

## 2022-04-27 ENCOUNTER — DOCUMENT SCAN (OUTPATIENT)
Dept: HOME HEALTH SERVICES | Facility: HOSPITAL | Age: 82
End: 2022-04-27
Payer: MEDICARE

## 2022-04-27 ENCOUNTER — OFFICE VISIT (OUTPATIENT)
Dept: UROLOGY | Facility: CLINIC | Age: 82
End: 2022-04-27
Payer: MEDICARE

## 2022-04-27 VITALS
SYSTOLIC BLOOD PRESSURE: 136 MMHG | HEART RATE: 82 BPM | DIASTOLIC BLOOD PRESSURE: 87 MMHG | HEIGHT: 63 IN | WEIGHT: 133.63 LBS | BODY MASS INDEX: 23.68 KG/M2

## 2022-04-27 DIAGNOSIS — N81.12 LATERAL CYSTOCELE: Primary | ICD-10-CM

## 2022-04-27 DIAGNOSIS — N81.9 VAGINAL VAULT PROLAPSE: ICD-10-CM

## 2022-04-27 DIAGNOSIS — R33.9 URINARY RETENTION: ICD-10-CM

## 2022-04-27 PROCEDURE — 1126F PR PAIN SEVERITY QUANTIFIED, NO PAIN PRESENT: ICD-10-PCS | Mod: CPTII,S$GLB,, | Performed by: UROLOGY

## 2022-04-27 PROCEDURE — 99999 PR PBB SHADOW E&M-EST. PATIENT-LVL V: ICD-10-PCS | Mod: PBBFAC,,, | Performed by: UROLOGY

## 2022-04-27 PROCEDURE — 51702 PR INSERTION OF TEMPORARY INDWELLING BLADDER CATHETER, SIMPLE: ICD-10-PCS | Mod: S$GLB,,, | Performed by: UROLOGY

## 2022-04-27 PROCEDURE — 99999 PR PBB SHADOW E&M-EST. PATIENT-LVL V: CPT | Mod: PBBFAC,,, | Performed by: UROLOGY

## 2022-04-27 PROCEDURE — 1111F PR DISCHARGE MEDS RECONCILED W/ CURRENT OUTPATIENT MED LIST: ICD-10-PCS | Mod: CPTII,S$GLB,, | Performed by: UROLOGY

## 2022-04-27 PROCEDURE — 1159F MED LIST DOCD IN RCRD: CPT | Mod: CPTII,S$GLB,, | Performed by: UROLOGY

## 2022-04-27 PROCEDURE — 3288F PR FALLS RISK ASSESSMENT DOCUMENTED: ICD-10-PCS | Mod: CPTII,S$GLB,, | Performed by: UROLOGY

## 2022-04-27 PROCEDURE — 3288F FALL RISK ASSESSMENT DOCD: CPT | Mod: CPTII,S$GLB,, | Performed by: UROLOGY

## 2022-04-27 PROCEDURE — 1126F AMNT PAIN NOTED NONE PRSNT: CPT | Mod: CPTII,S$GLB,, | Performed by: UROLOGY

## 2022-04-27 PROCEDURE — 1111F DSCHRG MED/CURRENT MED MERGE: CPT | Mod: CPTII,S$GLB,, | Performed by: UROLOGY

## 2022-04-27 PROCEDURE — 3075F PR MOST RECENT SYSTOLIC BLOOD PRESS GE 130-139MM HG: ICD-10-PCS | Mod: CPTII,S$GLB,, | Performed by: UROLOGY

## 2022-04-27 PROCEDURE — 99215 PR OFFICE/OUTPT VISIT, EST, LEVL V, 40-54 MIN: ICD-10-PCS | Mod: 25,S$GLB,, | Performed by: UROLOGY

## 2022-04-27 PROCEDURE — 3075F SYST BP GE 130 - 139MM HG: CPT | Mod: CPTII,S$GLB,, | Performed by: UROLOGY

## 2022-04-27 PROCEDURE — 87086 URINE CULTURE/COLONY COUNT: CPT | Performed by: UROLOGY

## 2022-04-27 PROCEDURE — 3079F DIAST BP 80-89 MM HG: CPT | Mod: CPTII,S$GLB,, | Performed by: UROLOGY

## 2022-04-27 PROCEDURE — 1101F PR PT FALLS ASSESS DOC 0-1 FALLS W/OUT INJ PAST YR: ICD-10-PCS | Mod: CPTII,S$GLB,, | Performed by: UROLOGY

## 2022-04-27 PROCEDURE — 1159F PR MEDICATION LIST DOCUMENTED IN MEDICAL RECORD: ICD-10-PCS | Mod: CPTII,S$GLB,, | Performed by: UROLOGY

## 2022-04-27 PROCEDURE — 1101F PT FALLS ASSESS-DOCD LE1/YR: CPT | Mod: CPTII,S$GLB,, | Performed by: UROLOGY

## 2022-04-27 PROCEDURE — 51702 INSERT TEMP BLADDER CATH: CPT | Mod: S$GLB,,, | Performed by: UROLOGY

## 2022-04-27 PROCEDURE — 3079F PR MOST RECENT DIASTOLIC BLOOD PRESSURE 80-89 MM HG: ICD-10-PCS | Mod: CPTII,S$GLB,, | Performed by: UROLOGY

## 2022-04-27 PROCEDURE — 99215 OFFICE O/P EST HI 40 MIN: CPT | Mod: 25,S$GLB,, | Performed by: UROLOGY

## 2022-04-27 NOTE — TELEPHONE ENCOUNTER
Spoke with patient's daughter, Ms Meyer.  She informed me patient was evaluated by Dr Manuel today, no one called them to set up appointment for further studies.  Informed referral was placed, please allow 3-5 days for coordinators to contact them.  Ms Meyer [place] [facility] [place].  Informed her I don't think the GYN performs such insertion.  Referral for Urogyn.  She would like to handle coordination of appointment.  Number provided.  Patient to follow up with Dr Osborn if lauren exchange approaches prior to consultation, but lauren was exchanged today.  No further questions from daughter.

## 2022-04-27 NOTE — PROGRESS NOTES
CHIEF COMPLAINT:    Mrs. Quinones is a 82 y.o. female presenting for a consultation at the request of Dr. Ayush Osborn. Patient presents with urinary retention.    PRESENTING ILLNESS:    Myesha Quinones is a 82 y.o. female who is accompanied by her son.  She has a history of severe COPD and was admitted to [facility] for CHF exacerbation and was found to have CKD.  A lauren catheter was placed during that admission in February.  She has had several failed voiding trials.  She was last seen by Dr. Osborn who placed the lauren on 4/1/2022.  She states she has a history of POP and before the catheter had fullness of the vagina.  She also has a history of DMII.      She is status post hysterectomy    REVIEW OF SYSTEMS:    Review of Systems   Constitutional: Negative.    HENT: Negative.    Eyes: Negative.    Respiratory: Positive for shortness of breath.    Cardiovascular: Positive for leg swelling.   Gastrointestinal: Positive for constipation.   Genitourinary:        Urinary retention   Musculoskeletal: Negative.    Skin: Negative.    Neurological: Negative.    Endo/Heme/Allergies:        Diabetes mellitus   Psychiatric/Behavioral: Negative.        PATIENT HISTORY:    Past Medical History:   Diagnosis Date    Acute hypoxemic respiratory failure 12/19/2019    Acute right-sided thoracic back pain 12/09/2019    Allergy     Asthma     Basal cell carcinoma     left forehead    Basal cell carcinoma     left nose    Basal cell carcinoma 05/20/2015    right nose    Basal cell carcinoma 12/22/2015    left lower post neck    Basal cell carcinoma 12/03/2019    left ant scalp     Bilateral renal cysts     Breast cancer     CAD (coronary artery disease)     Cardiomyopathy     Cardiomyopathy, ischemic     Cataract     CHF (congestive heart failure)     CKD (chronic kidney disease) stage 4, GFR 15-29 ml/min     Colon polyp 2011    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, with long-term current use of insulin 02/22/2018    COPD (chronic obstructive pulmonary disease)     COPD exacerbation 04/08/2018    Current mild episode of major depressive disorder without prior episode 01/25/2022    Defibrillator discharge     Diabetes mellitus     Diabetes mellitus type II     Diabetes with neurologic complications     Edema     Goiter     MNG    Hematuria, unspecified     HX: breast cancer     Hyperlipidemia     Hypertension     Hypocalcemia     Hypokalemia     Hyponatremia     Hyponatremia 4/2/2022    Iron deficiency anemia 05/16/2017    Iron deficiency anemia     Left kidney mass     Meningioma     Microalbuminuria due to type 2 diabetes mellitus 01/26/2022    Osteoporosis, postmenopausal     Pneumonia 12/08/2019    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    Proteinuria 01/21/2019    Pseudomonas pneumonia     Skin cancer     s/p excision    Sleep apnea     CPAP    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/2015    mid forehead    Unspecified vitamin D deficiency     UTI (urinary tract infection) 04/02/2022    Ventricular tachycardia     Vitamin B12 deficiency     Vitamin D deficiency disease        Past Surgical History:   Procedure Laterality Date    BASAL CELL CARCINOMA EXCISION      posterior neck and nose    BREAST BIOPSY      BREAST CYST EXCISION Left     BREAST SURGERY      CARDIAC DEFIBRILLATOR PLACEMENT      x 2    CATARACT EXTRACTION W/  INTRAOCULAR LENS IMPLANT Bilateral     CHOLECYSTECTOMY      COLONOSCOPY N/A 11/5/2019    Procedure: COLONOSCOPY;  Surgeon: Boaz Botello MD;  Location: [facility] ([address] [place] [address]);  Service: Endoscopy;  Laterality: N/A;  AICD - [employer] -     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 EP LAB;  Service: Cardiology;  Laterality: N/A;  DONNA, CRT-D gen change, MDT, MAC, SK, 3 Prep    REVISION OF SKIN POCKET FOR CARDIOVERTER-DEFIBRILLATOR  12/17/2018    Procedure: Revision, Skin Pocket, For Cardioverter-Defibrillator;  Surgeon: Jan Mckeon MD;  Location: [facility] EP LAB;  Service: Cardiology;;    SQUAMOUS CELL CARCINOMA EXCISION      remved from forehead    TONSILLECTOMY         Family History   Problem Relation Age of Onset    Diabetes Father     Heart disease Father     Diabetes Sister     Heart disease Sister     Diabetes Brother     Heart disease Brother     Hypertension Brother     Diabetes Brother     Heart disease Brother     Hypertension Brother     Diabetes Brother     Heart disease Brother     Cancer Brother         colon    Diabetes Brother     Cancer Son         skin    Diabetes Son         prediabetes    Diabetes Daughter         prediabetes    Cancer Daughter         melanoma    Obesity Daughter     Melanoma Daughter     Diabetes Son     Asthma Mother     Hypertension Mother     Stroke Mother        Social History     Socioeconomic History    Marital status:    Occupational History    Occupation: Homemaker     Employer: OTHER   Tobacco Use    Smoking status: Never Smoker    Smokeless tobacco: Never Used   Substance and Sexual Activity    Alcohol use: No     Alcohol/week: 0.0 standard drinks    Drug use: No    Sexual activity: Yes     Partners: Male   Other Topics Concern    Are you pregnant or think you may be? No    Breast-feeding No       Allergies:  Iodine and iodide containing products and Nifedipine    Medications:  Outpatient Encounter Medications as of 4/27/2022   Medication Sig Dispense Refill    albuterol (PROVENTIL/VENTOLIN HFA) 90 mcg/actuation inhaler INHALE 2 PUFFS INTO THE LUNGS EVERY 6 (SIX) HOURS AS NEEDED FOR WHEEZING. RESCUE 18 g 12     albuterol-ipratropium (DUO-NEB) 2.5 mg-0.5 mg/3 mL nebulizer solution TAKE 3 MLS BY NEBULIZATION EVERY 4 (FOUR) HOURS AS NEEDED FOR WHEEZING. 270 mL 12    benzonatate (TESSALON) 100 MG capsule Take 1 capsule (100 mg total) by mouth 3 (three) times daily as needed for Cough. 20 capsule 0    blood sugar diagnostic (ACCU-CHEK HECTOR) Strp Uses Accu-Check Hector meter to test BG 4x/day 400 strip 6    carvediloL (COREG) 25 MG tablet TAKE 2 TABLETS (50 MG TOTAL) BY MOUTH 2 (TWO) TIMES DAILY. 360 tablet 3    cholecalciferol, vitamin D3, (VITAMIN D3) 50 mcg (2,000 unit) Tab Take 1 tablet by mouth once daily.       fluticasone propionate (FLONASE) 50 mcg/actuation nasal spray 2 sprays (100 mcg total) by Each Nostril route once daily. (Patient taking differently: 2 sprays by Each Nostril route daily as needed.) 16 g 12    fluticasone-salmeterol diskus inhaler 250-50 mcg Inhale 1 puff into the lungs 2 (two) times daily. Controller 180 each 3    furosemide (LASIX) 40 MG tablet Take 1 tablet (40 mg total) by mouth every 8 (eight) hours as needed (swelling). 90 tablet 3    hydrALAZINE (APRESOLINE) 100 MG tablet Take 1 tablet (100 mg total) by mouth 2 (two) times daily. 180 tablet 3    insulin (LANTUS SOLOSTAR U-100 INSULIN) glargine 100 units/mL (3mL) SubQ pen Inject 22 Units into the skin.      lancets (ACCU-CHEK SOFTCLIX LANCETS) Misc Uses Accu-Chek Hector meter to test BG 1x/day 400 each 3    levocetirizine (XYZAL) 5 MG tablet Take 5 mg by mouth as needed.      magnesium oxide (MAG-OX) 400 mg tablet Take 1 tablet (400 mg total) by mouth once daily.  0    montelukast (SINGULAIR) 10 mg tablet Take 1 tablet (10 mg total) by mouth every evening. (Patient taking differently: Take 10 mg by mouth daily as needed.) 90 tablet 3    nitroGLYCERIN (NITROSTAT) 0.4 MG SL tablet Place 0.4 mg under the tongue every 5 (five) minutes as needed for Chest pain.       omega-3 fatty acids 500 mg Cap Take 1 capsule by mouth Twice  "daily.      pen needle, diabetic (BD ULTRA-FINE MINI PEN NEEDLE) 31 gauge x 3/16" Ndle USE WITH LANTUS AT BEDTIME 400 each 3    polyethylene glycol (GLYCOLAX) 17 gram PwPk Take 17 g by mouth daily as needed (constipation). 10 each 1    pravastatin (PRAVACHOL) 40 MG tablet Take 1 tablet (40 mg total) by mouth once daily. 90 tablet 3    pulse oximeter (PULSE OXIMETER) device by Apply Externally route 2 (two) times a day. Use twice daily at 8 AM and 3 PM and [facility] the [facility] as directed. 1 each 0    sodium bicarbonate 650 MG tablet Take 2 tablets (1,300 mg total) by mouth 3 (three) times daily. 180 tablet 11    sodium chloride 3% 3 % nebulizer solution TAKE 4 MLS BY NEBULIZATION 4 (FOUR) TIMES DAILY AS NEEDED FOR OTHER OR COUGH (TO INDUCE SPUTUM AND CLEAR MUCOUS.). 750 mL 11    tiotropium (SPIRIVA) 18 mcg inhalation capsule Inhale 1 capsule (18 mcg total) into the lungs once daily. Controller 90 capsule 3    valsartan (DIOVAN) 80 MG tablet Take 1 tablet (80 mg total) by mouth 2 (two) times daily. 180 tablet 3     No facility-administered encounter medications on file as of 4/27/2022.         PHYSICAL EXAMINATION:    The patient generally appears in good health, is appropriately interactive, and is in no apparent distress.    Skin: No lesions.    Mental: Cooperative with normal affect.    Neuro: Grossly intact.    HEENT: Normal. No evidence of lymphadenopathy.    Chest:  normal inspiratory effort.    Abdomen:  Soft, non-tender. No masses or organomegaly. Bladder is not palpable. No evidence of flank discomfort. No evidence of inguinal hernia.    Extremities: No clubbing, cyanosis, or edema    Normal external female genitalia  Urethral meatus is normal  Urethra and bladder are nontender to bimanual exam  Stage II cystocele anteriorly   Posteriorly relatively well supported   Uterus and cervix are normal  No adnexal masses    Aa 0, Ba 0, C -4.5  gH 4, pB 2.5, TVL 9  Ap -2, Bp -2, D n/a    LABS:    Lab " Results   Component Value Date    BUN 68 (H) 04/11/2022    CREATININE 2.5 (H) 04/11/2022     HgA1c 5.9 2/22/2022    IMPRESSION:    Encounter Diagnoses   Name Primary?    Urinary retention        PLAN:    1.  The catheterized specimen was sent for culture  2.  Referred for pessary  3.  Replaced the catheter the specimen was sent from the new catheter  4.  Information discussing prolapse, (not a surgical candidate) The catheter can come out at the time of pessary placement.  If she voids, then no urodynamics are needed.      Copy to:  Dr. Ayush Osborn

## 2022-04-27 NOTE — TELEPHONE ENCOUNTER
----- Message from Zahra Mcdonough sent at 4/27/2022  3:45 PM CDT -----  Type:  Needs Medical Advice    Who Called: pt    Would the patient rather a call back or a response via [facility]? Please call  Best Call Back Number: [phone]  Additional Information:

## 2022-04-28 LAB — BACTERIA UR CULT: NORMAL

## 2022-05-03 ENCOUNTER — OFFICE VISIT (OUTPATIENT)
Dept: CARDIOLOGY | Facility: CLINIC | Age: 82
End: 2022-05-03
Payer: MEDICARE

## 2022-05-03 VITALS
HEART RATE: 83 BPM | WEIGHT: 137.13 LBS | DIASTOLIC BLOOD PRESSURE: 56 MMHG | RESPIRATION RATE: 20 BRPM | BODY MASS INDEX: 24.3 KG/M2 | SYSTOLIC BLOOD PRESSURE: 143 MMHG | HEIGHT: 63 IN

## 2022-05-03 DIAGNOSIS — E11.59 HYPERTENSION ASSOCIATED WITH DIABETES: ICD-10-CM

## 2022-05-03 DIAGNOSIS — E11.69 HYPERLIPIDEMIA ASSOCIATED WITH TYPE 2 DIABETES MELLITUS: ICD-10-CM

## 2022-05-03 DIAGNOSIS — I50.42 CHRONIC COMBINED SYSTOLIC AND DIASTOLIC HEART FAILURE: Primary | ICD-10-CM

## 2022-05-03 DIAGNOSIS — E78.5 HYPERLIPIDEMIA ASSOCIATED WITH TYPE 2 DIABETES MELLITUS: ICD-10-CM

## 2022-05-03 DIAGNOSIS — I15.2 HYPERTENSION ASSOCIATED WITH DIABETES: ICD-10-CM

## 2022-05-03 PROCEDURE — 99204 PR OFFICE/OUTPT VISIT, NEW, LEVL IV, 45-59 MIN: ICD-10-PCS | Mod: S$GLB,,, | Performed by: INTERNAL MEDICINE

## 2022-05-03 PROCEDURE — 99204 OFFICE O/P NEW MOD 45 MIN: CPT | Mod: S$GLB,,, | Performed by: INTERNAL MEDICINE

## 2022-05-03 PROCEDURE — 1101F PR PT FALLS ASSESS DOC 0-1 FALLS W/OUT INJ PAST YR: ICD-10-PCS | Mod: CPTII,S$GLB,, | Performed by: INTERNAL MEDICINE

## 2022-05-03 PROCEDURE — 1111F PR DISCHARGE MEDS RECONCILED W/ CURRENT OUTPATIENT MED LIST: ICD-10-PCS | Mod: CPTII,S$GLB,, | Performed by: INTERNAL MEDICINE

## 2022-05-03 PROCEDURE — 3288F FALL RISK ASSESSMENT DOCD: CPT | Mod: CPTII,S$GLB,, | Performed by: INTERNAL MEDICINE

## 2022-05-03 PROCEDURE — 1111F DSCHRG MED/CURRENT MED MERGE: CPT | Mod: CPTII,S$GLB,, | Performed by: INTERNAL MEDICINE

## 2022-05-03 PROCEDURE — 1160F RVW MEDS BY RX/DR IN RCRD: CPT | Mod: CPTII,S$GLB,, | Performed by: INTERNAL MEDICINE

## 2022-05-03 PROCEDURE — 99499 RISK ADDL DX/OHS AUDIT: ICD-10-PCS | Mod: S$GLB,,, | Performed by: INTERNAL MEDICINE

## 2022-05-03 PROCEDURE — 3078F PR MOST RECENT DIASTOLIC BLOOD PRESSURE < 80 MM HG: ICD-10-PCS | Mod: CPTII,S$GLB,, | Performed by: INTERNAL MEDICINE

## 2022-05-03 PROCEDURE — 1101F PT FALLS ASSESS-DOCD LE1/YR: CPT | Mod: CPTII,S$GLB,, | Performed by: INTERNAL MEDICINE

## 2022-05-03 PROCEDURE — 3078F DIAST BP <80 MM HG: CPT | Mod: CPTII,S$GLB,, | Performed by: INTERNAL MEDICINE

## 2022-05-03 PROCEDURE — 1126F PR PAIN SEVERITY QUANTIFIED, NO PAIN PRESENT: ICD-10-PCS | Mod: CPTII,S$GLB,, | Performed by: INTERNAL MEDICINE

## 2022-05-03 PROCEDURE — 99999 PR PBB SHADOW E&M-EST. PATIENT-LVL V: CPT | Mod: PBBFAC,,, | Performed by: INTERNAL MEDICINE

## 2022-05-03 PROCEDURE — 1126F AMNT PAIN NOTED NONE PRSNT: CPT | Mod: CPTII,S$GLB,, | Performed by: INTERNAL MEDICINE

## 2022-05-03 PROCEDURE — 3077F SYST BP >= 140 MM HG: CPT | Mod: CPTII,S$GLB,, | Performed by: INTERNAL MEDICINE

## 2022-05-03 PROCEDURE — 1159F PR MEDICATION LIST DOCUMENTED IN MEDICAL RECORD: ICD-10-PCS | Mod: CPTII,S$GLB,, | Performed by: INTERNAL MEDICINE

## 2022-05-03 PROCEDURE — 3288F PR FALLS RISK ASSESSMENT DOCUMENTED: ICD-10-PCS | Mod: CPTII,S$GLB,, | Performed by: INTERNAL MEDICINE

## 2022-05-03 PROCEDURE — 99999 PR PBB SHADOW E&M-EST. PATIENT-LVL V: ICD-10-PCS | Mod: PBBFAC,,, | Performed by: INTERNAL MEDICINE

## 2022-05-03 PROCEDURE — 99499 UNLISTED E&M SERVICE: CPT | Mod: S$GLB,,, | Performed by: INTERNAL MEDICINE

## 2022-05-03 PROCEDURE — 3077F PR MOST RECENT SYSTOLIC BLOOD PRESSURE >= 140 MM HG: ICD-10-PCS | Mod: CPTII,S$GLB,, | Performed by: INTERNAL MEDICINE

## 2022-05-03 PROCEDURE — 1159F MED LIST DOCD IN RCRD: CPT | Mod: CPTII,S$GLB,, | Performed by: INTERNAL MEDICINE

## 2022-05-03 PROCEDURE — 1160F PR REVIEW ALL MEDS BY PRESCRIBER/CLIN PHARMACIST DOCUMENTED: ICD-10-PCS | Mod: CPTII,S$GLB,, | Performed by: INTERNAL MEDICINE

## 2022-05-03 NOTE — PROGRESS NOTES
HPI: 83 yo F with a h/o NICMP with recovery, HFpEF, s/p BiV-AICD '04, CKD4, IDDM, hypertension, hyperlipidemia, COPD, KHOA s/p CPAP, and APBA presenting for initial evaluation by me.    The patient reports intermittent issues with volume overload. She has stage 4 CKD with a high BUN/cr ratio. She is on furosemide 40x1 most days and will occasionally take a second dose if she notes pitting edema or significant weight gain. She tries to keep her weight in the low 130s. No chest pain or SOB rest. Chronic stable PELAYO. Mild le edema. No orthopnea.     She also tolerates carvedilol 50 x 2, hydralazine 100 x 2, valsartan 40x2, furosemide 40x1, and pravastatin 40x1. SBPs 120-140s with Hrs in the 70-80s.    PHYSICAL EXAM:  Vitals:    05/03/22 1306   BP: (!) 143/56   Pulse: 83   Resp: 20       Physical Exam  Constitutional:       Appearance: Normal appearance.   Neck:      Vascular: No carotid bruit or JVD.   Cardiovascular:      Rate and Rhythm: Normal rate and regular rhythm.      Pulses:           Radial pulses are 2+ on the right side and 2+ on the left side.        Dorsalis pedis pulses are 1+ on the right side and 1+ on the left side.      Heart sounds: S1 normal and S2 normal. No murmur heard.    No S3 sounds.      Comments: Mild edema.   Pulmonary:      Effort: Pulmonary effort is normal.      Breath sounds: Decreased air movement present. No rales.   Neurological:      Mental Status: She is alert.         LABS/CARDIAC TESTS:  April 2022 hemoglobin 8.6 with MCV of 90. Creatinine 2.5 with BUN of 68. Albumin 2.9.  . LDL 29 HDL 39.  ECG April 2022 demonstrates AV pacing.  Device check April 2022 shows 100% bi V pacing.  No arrhythmias.  TTE February 2022 demonstrates normal LV size with an EF of 45-50%.  Global HK.  No significant valve disease.  CVP 3. PASP 46.   Nuclear stress test 2016 no evidence of ischemia.  Renal duplex 2017 no evidence of renal artery stenosis bilaterally.  PFTs 2020 Severe obstruction and  moderately decreased DLCO.    ASSESSMENT & PLAN:    Chronic combined systolic and diastolic heart failure  The patient has HFpEF in the setting of CKD. Renal failure is a large contribution. EF preserved with high BUN:cr limiting aggressiveness of diuresis. Defer to nephrology in titration of furosemide. Anticipate that dialysis may be necessary for better volume control. Blood pressures well controlled on home log.    Hypertension associated with diabetes  Patient with extensive home log that demonstrates reasonable Bps. Cont carvedilol 50x2, valsartan 40x2, and hydralazine 100x2. Pt does not want initiation of another anti-htnsive.     Hyperlipidemia associated with type 2 diabetes mellitus  LDL at goal on pravastatin.      Chronic combined systolic and diastolic heart failure    Hypertension associated with diabetes    Hyperlipidemia associated with type 2 diabetes mellitus        Seng Salgado MD

## 2022-05-03 NOTE — ASSESSMENT & PLAN NOTE
The patient has HFpEF in the setting of CKD. Renal failure is a large contribution. EF preserved with high BUN:cr limiting aggressiveness of diuresis. Defer to nephrology in titration of furosemide. Anticipate that dialysis may be necessary for better volume control. Blood pressures well controlled on home log.

## 2022-05-03 NOTE — ASSESSMENT & PLAN NOTE
Patient with extensive home log that demonstrates reasonable Bps. Cont carvedilol 50x2, valsartan 40x2, and hydralazine 100x2. Pt does not want initiation of another anti-htnsive.

## 2022-05-06 ENCOUNTER — DOCUMENT SCAN (OUTPATIENT)
Dept: HOME HEALTH SERVICES | Facility: HOSPITAL | Age: 82
End: 2022-05-06
Payer: MEDICARE

## 2022-05-06 NOTE — PROGRESS NOTES
Subjective:       Patient ID: Myesha Quinones is a 76 y.o. female.    Chief Complaint: Follow-up and Peripheral Neuropathy    HPI     Returns for annual follow-up, labs and mammogram.  She is doing well in the interval.     This is a 76-year-old female, who returns for her annual followup with a history of breast carcinoma. She is a previous patient of Dr. Amadeo Thomsa. She was diagnosed with a right breast carcinoma in 1998 with a 2.5 cm infiltrating, poorly differentiated ductal carcinoma on mastectomy specimen, histologic grade III, nuclear grade II, and mitotic index 1 with negative margins and 2 out of 17 lymph nodes showing metastatic carcinoma.     The patient was treated with 4 cycles of Adriamycin and Cytoxan, followed by 4 cycles of Taxotere. She was then placed on tamoxifen for 5 years based on ER/CA status. She was then switched to Femara, and she completed 5 years in 2009.     Review of Systems   Constitutional: Positive for fatigue (with age). Negative for activity change, appetite change, chills, diaphoresis, fever and unexpected weight change.   HENT: Negative for congestion, dental problem, ear pain, hearing loss, mouth sores, nosebleeds, rhinorrhea, tinnitus and trouble swallowing.    Eyes: Negative for visual disturbance.   Respiratory: Positive for shortness of breath (off and on with asthma) and wheezing (off and on with asthma). Negative for cough and chest tightness.         Up and down with asthma   Cardiovascular: Negative for chest pain, palpitations and leg swelling.   Gastrointestinal: Negative for abdominal distention, abdominal pain, blood in stool, constipation, diarrhea, nausea and vomiting.   Genitourinary: Negative for decreased urine volume, difficulty urinating, dysuria, frequency and hematuria.   Musculoskeletal: Positive for arthralgias (chronic, unchanged). Negative for back pain, gait problem, joint swelling and neck pain.   Skin: Negative for color change, pallor,  rash and wound.   Neurological: Positive for numbness (chronic since chemotherapy). Negative for dizziness, weakness, light-headedness and headaches.   Hematological: Negative for adenopathy. Does not bruise/bleed easily.   Psychiatric/Behavioral: Negative for dysphoric mood and sleep disturbance. The patient is not nervous/anxious.        Objective:      Physical Exam   Constitutional: She is oriented to person, place, and time. She appears well-developed and well-nourished. No distress.   Presents with her    HENT:   Head: Normocephalic and atraumatic.   Right Ear: External ear normal.   Left Ear: External ear normal.   Nose: Nose normal.   Mouth/Throat: Oropharynx is clear and moist. No oropharyngeal exudate.   Eyes: Conjunctivae and EOM are normal. Pupils are equal, round, and reactive to light. No scleral icterus. Right pupil is round and reactive. Left pupil is round and reactive.   Neck: Normal range of motion. Neck supple. No thyromegaly present.   Cardiovascular: Normal rate, regular rhythm, normal heart sounds and intact distal pulses.  Exam reveals no gallop and no friction rub.    No murmur heard.  Pulmonary/Chest: Effort normal and breath sounds normal. No respiratory distress. She has no wheezes. She has no rales.   Breasts- no masses, no LAD on left  ICD in place  Right breast post mastectomy without evidence of chest wall recurrence   Abdominal: Soft. Bowel sounds are normal. She exhibits no distension and no mass. There is no hepatosplenomegaly. There is no tenderness. There is no rebound and no guarding.   No organomegaly   Musculoskeletal: Normal range of motion. She exhibits no edema or tenderness.   Lymphadenopathy:        Head (right side): No submandibular adenopathy present.        Head (left side): No submandibular adenopathy present.     She has no cervical adenopathy.        Right cervical: No superficial cervical, no deep cervical and no posterior cervical adenopathy present.        Left cervical: No superficial cervical, no deep cervical and no posterior cervical adenopathy present.        Right: No inguinal and no supraclavicular adenopathy present.        Left: No inguinal and no supraclavicular adenopathy present.   Neurological: She is alert and oriented to person, place, and time. She has normal strength. No cranial nerve deficit or sensory deficit. She exhibits normal muscle tone. Coordination normal.   Skin: Skin is warm and dry. No bruising, no lesion, no petechiae and no rash noted. She is not diaphoretic. No erythema. No pallor.   Psychiatric: She has a normal mood and affect. Her behavior is normal. Judgment and thought content normal. Her mood appears not anxious. She does not exhibit a depressed mood.   Nursing note and vitals reviewed.    Labs- reviewed  Mammogram- reviewed  Assessment:       1. History of breast cancer        Plan:     1. HEIDI clinically  Knows to call for any issues in the inetrval  RTC 1 year, labs prior and left mammogram         Nate Cisse(Resident)

## 2022-05-08 ENCOUNTER — CLINICAL SUPPORT (OUTPATIENT)
Dept: CARDIOLOGY | Facility: HOSPITAL | Age: 82
End: 2022-05-08
Payer: MEDICARE

## 2022-05-08 DIAGNOSIS — Z95.810 PRESENCE OF AUTOMATIC (IMPLANTABLE) CARDIAC DEFIBRILLATOR: ICD-10-CM

## 2022-05-08 DIAGNOSIS — I47.20 VENTRICULAR TACHYCARDIA: ICD-10-CM

## 2022-05-08 PROCEDURE — 93295 DEV INTERROG REMOTE 1/2/MLT: CPT | Mod: ,,, | Performed by: INTERNAL MEDICINE

## 2022-05-08 PROCEDURE — 93296 REM INTERROG EVL PM/IDS: CPT | Performed by: INTERNAL MEDICINE

## 2022-05-08 PROCEDURE — 93295 CARDIAC DEVICE CHECK - REMOTE: ICD-10-PCS | Mod: ,,, | Performed by: INTERNAL MEDICINE

## 2022-05-09 ENCOUNTER — PATIENT MESSAGE (OUTPATIENT)
Dept: UROLOGY | Facility: CLINIC | Age: 82
End: 2022-05-09
Payer: MEDICARE

## 2022-05-09 ENCOUNTER — OFFICE VISIT (OUTPATIENT)
Dept: UROGYNECOLOGY | Facility: CLINIC | Age: 82
End: 2022-05-09
Payer: MEDICARE

## 2022-05-09 VITALS
DIASTOLIC BLOOD PRESSURE: 62 MMHG | SYSTOLIC BLOOD PRESSURE: 151 MMHG | BODY MASS INDEX: 23.83 KG/M2 | WEIGHT: 134.5 LBS | HEIGHT: 63 IN

## 2022-05-09 DIAGNOSIS — N81.9 VAGINAL VAULT PROLAPSE: ICD-10-CM

## 2022-05-09 DIAGNOSIS — R33.9 URINARY RETENTION: ICD-10-CM

## 2022-05-09 DIAGNOSIS — N81.12 LATERAL CYSTOCELE: ICD-10-CM

## 2022-05-09 DIAGNOSIS — N95.2 POSTMENOPAUSE ATROPHIC VAGINITIS: Primary | ICD-10-CM

## 2022-05-09 PROCEDURE — 1126F PR PAIN SEVERITY QUANTIFIED, NO PAIN PRESENT: ICD-10-PCS | Mod: CPTII,S$GLB,, | Performed by: OBSTETRICS & GYNECOLOGY

## 2022-05-09 PROCEDURE — 3078F DIAST BP <80 MM HG: CPT | Mod: CPTII,S$GLB,, | Performed by: OBSTETRICS & GYNECOLOGY

## 2022-05-09 PROCEDURE — 99203 PR OFFICE/OUTPT VISIT, NEW, LEVL III, 30-44 MIN: ICD-10-PCS | Mod: S$GLB,,, | Performed by: OBSTETRICS & GYNECOLOGY

## 2022-05-09 PROCEDURE — 3288F FALL RISK ASSESSMENT DOCD: CPT | Mod: CPTII,S$GLB,, | Performed by: OBSTETRICS & GYNECOLOGY

## 2022-05-09 PROCEDURE — 99999 PR PBB SHADOW E&M-EST. PATIENT-LVL IV: CPT | Mod: PBBFAC,,, | Performed by: OBSTETRICS & GYNECOLOGY

## 2022-05-09 PROCEDURE — 3077F PR MOST RECENT SYSTOLIC BLOOD PRESSURE >= 140 MM HG: ICD-10-PCS | Mod: CPTII,S$GLB,, | Performed by: OBSTETRICS & GYNECOLOGY

## 2022-05-09 PROCEDURE — 99999 PR PBB SHADOW E&M-EST. PATIENT-LVL IV: ICD-10-PCS | Mod: PBBFAC,,, | Performed by: OBSTETRICS & GYNECOLOGY

## 2022-05-09 PROCEDURE — 3077F SYST BP >= 140 MM HG: CPT | Mod: CPTII,S$GLB,, | Performed by: OBSTETRICS & GYNECOLOGY

## 2022-05-09 PROCEDURE — 1101F PR PT FALLS ASSESS DOC 0-1 FALLS W/OUT INJ PAST YR: ICD-10-PCS | Mod: CPTII,S$GLB,, | Performed by: OBSTETRICS & GYNECOLOGY

## 2022-05-09 PROCEDURE — 3288F PR FALLS RISK ASSESSMENT DOCUMENTED: ICD-10-PCS | Mod: CPTII,S$GLB,, | Performed by: OBSTETRICS & GYNECOLOGY

## 2022-05-09 PROCEDURE — 1159F MED LIST DOCD IN RCRD: CPT | Mod: CPTII,S$GLB,, | Performed by: OBSTETRICS & GYNECOLOGY

## 2022-05-09 PROCEDURE — 1101F PT FALLS ASSESS-DOCD LE1/YR: CPT | Mod: CPTII,S$GLB,, | Performed by: OBSTETRICS & GYNECOLOGY

## 2022-05-09 PROCEDURE — 3078F PR MOST RECENT DIASTOLIC BLOOD PRESSURE < 80 MM HG: ICD-10-PCS | Mod: CPTII,S$GLB,, | Performed by: OBSTETRICS & GYNECOLOGY

## 2022-05-09 PROCEDURE — 1126F AMNT PAIN NOTED NONE PRSNT: CPT | Mod: CPTII,S$GLB,, | Performed by: OBSTETRICS & GYNECOLOGY

## 2022-05-09 PROCEDURE — 1159F PR MEDICATION LIST DOCUMENTED IN MEDICAL RECORD: ICD-10-PCS | Mod: CPTII,S$GLB,, | Performed by: OBSTETRICS & GYNECOLOGY

## 2022-05-09 PROCEDURE — 99203 OFFICE O/P NEW LOW 30 MIN: CPT | Mod: S$GLB,,, | Performed by: OBSTETRICS & GYNECOLOGY

## 2022-05-09 RX ORDER — ESTRADIOL 0.1 MG/G
1 CREAM VAGINAL
Qty: 153 G | Refills: 6 | Status: SHIPPED | OUTPATIENT
Start: 2022-05-09 | End: 2022-09-19

## 2022-05-09 NOTE — PROGRESS NOTES
Subjective:      Patient ID: Myesha Quinones is a 82 y.o. female.    Chief Complaint:  Consult and Vaginal Prolapse      History of Present Illness  Patient pelvic organ prolapse referred for conservative therapy with pessary patient with indwelling De La Cruz catheter.  Soft that the pessary will assist in patient in decompressing bladder.          Past Medical History:   Diagnosis Date    Acute hypoxemic respiratory failure 12/19/2019    Acute right-sided thoracic back pain 12/09/2019    Allergy     Asthma     Basal cell carcinoma     left forehead    Basal cell carcinoma     left nose    Basal cell carcinoma 05/20/2015    right nose    Basal cell carcinoma 12/22/2015    left lower post neck    Basal cell carcinoma 12/03/2019    left ant scalp     Bilateral renal cysts     Breast cancer     CAD (coronary artery disease)     Cardiomyopathy     Cardiomyopathy, ischemic     Cataract     CHF (congestive heart failure)     CKD (chronic kidney disease) stage 4, GFR 15-29 ml/min     Colon polyp 2011    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, with long-term current use of insulin 02/22/2018    COPD (chronic obstructive pulmonary disease)     COPD exacerbation 04/08/2018    Current mild episode of major depressive disorder without prior episode 01/25/2022    Defibrillator discharge     Diabetes mellitus     Diabetes mellitus type II     Diabetes with neurologic complications     Edema     Goiter     MNG    Hematuria, unspecified     HX: breast cancer     Hyperlipidemia     Hypertension     Hypocalcemia     Hypokalemia     Hyponatremia     Hyponatremia 4/2/2022    Iron deficiency anemia 05/16/2017    Iron deficiency anemia     Left kidney mass     Meningioma     Microalbuminuria due to type 2 diabetes mellitus 01/26/2022    Osteoporosis, postmenopausal     Pneumonia 12/08/2019    Postinflammatory pulmonary  fibrosis 2016    Proteinuria 2019    Pseudomonas pneumonia     Skin cancer     s/p excision    Sleep apnea     CPAP    Squamous cell carcinoma 2015    mid forehead    Unspecified vitamin D deficiency     UTI (urinary tract infection) 2022    Ventricular tachycardia     Vitamin B12 deficiency     Vitamin D deficiency disease        Past Surgical History:   Procedure Laterality Date    BASAL CELL CARCINOMA EXCISION      posterior neck and nose    BREAST BIOPSY      BREAST CYST EXCISION Left     BREAST SURGERY      CARDIAC DEFIBRILLATOR PLACEMENT      x 2    CATARACT EXTRACTION W/  INTRAOCULAR LENS IMPLANT Bilateral     CHOLECYSTECTOMY      COLONOSCOPY N/A 2019    Procedure: COLONOSCOPY;  Surgeon: Boaz Botello MD;  Location: [facility] ENDO ([address] [place] [address]);  Service: Endoscopy;  Laterality: N/A;  AICD - Medtronic - sm    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    FRACTURE SURGERY      left elbow and wrist as a child    HYSTERECTOMY      MASTECTOMY Right     REPLACEMENT OF IMPLANTABLE CARDIOVERTER-DEFIBRILLATOR (ICD) GENERATOR N/A 2018    Procedure: REPLACEMENT, ICD GENERATOR;  Surgeon: Jan Mckeon MD;  Location: [facility] EP LAB;  Service: Cardiology;  Laterality: N/A;  DONNA, CRT-D gen sulema, MDT, MAC, SK, 3 Prep    REVISION OF SKIN POCKET FOR CARDIOVERTER-DEFIBRILLATOR  2018    Procedure: Revision, Skin Pocket, For Cardioverter-Defibrillator;  Surgeon: Jan Mckeon MD;  Location: [facility] EP LAB;  Service: Cardiology;;    SQUAMOUS CELL CARCINOMA EXCISION      remved from forehead    TONSILLECTOMY         GYN & OB History  No LMP recorded (lmp unknown). Patient has had a hysterectomy.   Date of Last Pap: No result found    OB History    Para Term  AB Living   3 3 3         SAB IAB Ectopic Multiple Live Births                  # Outcome Date GA Lbr Fernando/2nd Weight Sex Delivery Anes PTL Lv   3 Term            2 Term            1 Term                 Health Maintenance       Date Due Completion Date    Shingles Vaccine (1 of 2) 05/28/2009 4/2/2009    Pneumococcal Vaccines (Age 65+) (3 - PPSV23 or PCV20) 06/05/2018 7/23/2015    DEXA Scan 04/30/2020 4/30/2018    Foot Exam 11/06/2020 11/6/2019    Override on 6/4/2015: Done    COVID-19 Vaccine (4 - Booster for Pfizer series) 03/12/2022 11/12/2021    Hemoglobin A1c 08/22/2022 2/22/2022    Eye Exam 02/10/2023 2/10/2022    Diabetes Urine Screening 04/03/2023 4/3/2022    Lipid Panel 04/03/2023 4/3/2022    TETANUS VACCINE 07/22/2024 7/22/2014          Family History   Problem Relation Age of Onset    Diabetes Father     Heart disease Father     Diabetes Sister     Heart disease Sister     Diabetes Brother     Heart disease Brother     Hypertension Brother     Diabetes Brother     Heart disease Brother     Hypertension Brother     Diabetes Brother     Heart disease Brother     Cancer Brother         colon    Diabetes Brother     Cancer Son         skin    Diabetes Son         prediabetes    Diabetes Daughter         prediabetes    Cancer Daughter         melanoma    Obesity Daughter     Melanoma Daughter     Diabetes Son     Asthma Mother     Hypertension Mother     Stroke Mother     No Known Problems Maternal Grandmother     No Known Problems Maternal Grandfather     No Known Problems Paternal Grandmother     No Known Problems Paternal Grandfather     Amblyopia Neg Hx     Blindness Neg Hx     Cataracts Neg Hx     Glaucoma Neg Hx     Macular degeneration Neg Hx     Retinal detachment Neg Hx     Strabismus Neg Hx     Thyroid disease Neg Hx        Social History     Socioeconomic History    Marital status:    Occupational History    Occupation: Homemaker     Employer: OTHER   Tobacco Use    Smoking status: Never Smoker    Smokeless tobacco: Never Used   Substance and Sexual Activity    Alcohol use: No     Alcohol/week: 0.0 standard drinks    Drug use: No    Sexual  "activity: Yes     Partners: Male   Other Topics Concern    Are you pregnant or think you may be? No    Breast-feeding No       Review of Systems  Review of Systems  Multi voiding bulge pressure     Objective:   Ht 5' 3" (1.6 m)   LMP  (LMP Unknown)   BMI 24.29 kg/m²     Physical Exam   Stage II anterior compartment defect some minimal apical involvement but urogenital tissues extremely friable and attenuated  Assessment:     1. Urinary retention    2. Lateral cystocele    3. Vaginal vault prolapse            Plan:     1. Urinary retention    2. Lateral cystocele    3. Vaginal vault prolapse      Will start hormone therapy transvaginal for period of 3 weeks bring patient back in for pessary fitting at that time patient understanding greatly appreciated time    I am in complete agreement this kind patient is not a candidate for surgical intervention at this    There are no Patient Instructions on file for this visit.        "

## 2022-05-10 ENCOUNTER — TELEPHONE (OUTPATIENT)
Dept: UROGYNECOLOGY | Facility: CLINIC | Age: 82
End: 2022-05-10
Payer: MEDICARE

## 2022-05-10 ENCOUNTER — PATIENT MESSAGE (OUTPATIENT)
Dept: UROGYNECOLOGY | Facility: CLINIC | Age: 82
End: 2022-05-10
Payer: MEDICARE

## 2022-05-10 NOTE — TELEPHONE ENCOUNTER
Spoke with patient's daughter, answered all questions. Patient will come for pessary fitting next month.

## 2022-05-10 NOTE — PROGRESS NOTES
Subjective:    Patient ID:  Myesha Quinones is a 82 y.o. female who presents for follow-up of Pacemaker Check      HPI 81 yo female with NICM, HTN, LBBB, CAD, CRT-D, CKD IV, DM, sleep apnea.     Background:     CRT-D implanted 2007 >> EF has normalized.   Has done well for extended period.   12/17/12 had an episode of VT >> ATP unsuccessful, shock resulted in Type II break. She had presyncope >> then had shock.  We elected to defer addition of anti-arrhythmic therapy, as this was first shock, EF was normal.  Generator change 3/2013 and 12/17/2018.       Had COVID 12/20. Her  had it as well, and passed away.    Echo 7/6/20 EF 50% PASP 60 mm Hg.    Update:  Admitted 2/22 with acute decompensated HF. Initially managed in HF transition clinic. Now with Dr. Salgado.  Has been doing better overall in regard to shortness of breath.  Echo 2/22/22 EF 45-50% normal RV size and function  Device interrogation reveals stable function of leads, RA pacing 0% biventricular pacing > 99% no significant arrhythmias.      Review of Systems   Constitutional: Negative. Negative for fever and malaise/fatigue.   HENT: Negative for congestion and sore throat.    Cardiovascular: Negative for chest pain, dyspnea on exertion, irregular heartbeat, leg swelling, near-syncope, orthopnea, palpitations, paroxysmal nocturnal dyspnea and syncope.   Respiratory: Positive for shortness of breath. Negative for cough.    Gastrointestinal: Negative for abdominal pain, constipation and diarrhea.   Neurological: Negative for dizziness, light-headedness and weakness.   Psychiatric/Behavioral: Negative for depression. The patient is not nervous/anxious.         Objective:    Physical Exam  Constitutional:       Appearance: She is well-developed.   Eyes:      General: No scleral icterus.     Conjunctiva/sclera: Conjunctivae normal.   Neck:      Vascular: No JVD.      Trachea: No tracheal deviation.   Cardiovascular:      Rate and Rhythm: Normal rate  and regular rhythm.      Chest Wall: PMI is not displaced.   Pulmonary:      Effort: Pulmonary effort is normal. No respiratory distress.      Breath sounds: Normal breath sounds.   Abdominal:      Palpations: Abdomen is soft.      Tenderness: There is no abdominal tenderness.   Musculoskeletal:         General: No tenderness.   Skin:     General: Skin is warm and dry.      Findings: No rash.   Neurological:      Mental Status: She is alert and oriented to person, place, and time.   Psychiatric:         Behavior: Behavior normal.           Assessment:       1. Cardiomyopathy, ischemic    2. LBBB (left bundle branch block)    3. Biventricular ICD (implantable cardioverter-defibrillator) in place    4. Hypertension associated with diabetes    5. Chronic combined systolic and diastolic heart failure    6. Moderate persistent asthma, unspecified whether complicated    7. CKD (chronic kidney disease) stage 4, GFR 15-29 ml/min    8. Diabetic polyneuropathy associated with type 2 diabetes mellitus    9. KHOA (obstructive sleep apnea)         Plan:       S/p CRT, she has been a responder.    Doing well from an arrhythmia standpoint  Continue current settings and medications.  F/u with monitoring as scheduled, with me in one year

## 2022-05-11 ENCOUNTER — CLINICAL SUPPORT (OUTPATIENT)
Dept: CARDIOLOGY | Facility: HOSPITAL | Age: 82
End: 2022-05-11
Attending: INTERNAL MEDICINE
Payer: MEDICARE

## 2022-05-11 ENCOUNTER — OFFICE VISIT (OUTPATIENT)
Dept: ELECTROPHYSIOLOGY | Facility: CLINIC | Age: 82
End: 2022-05-11
Payer: MEDICARE

## 2022-05-11 ENCOUNTER — HOSPITAL ENCOUNTER (OUTPATIENT)
Dept: CARDIOLOGY | Facility: CLINIC | Age: 82
Discharge: HOME | End: 2022-05-11
Payer: MEDICARE

## 2022-05-11 VITALS
DIASTOLIC BLOOD PRESSURE: 54 MMHG | WEIGHT: 136 LBS | SYSTOLIC BLOOD PRESSURE: 122 MMHG | HEIGHT: 63 IN | BODY MASS INDEX: 24.1 KG/M2 | HEART RATE: 64 BPM

## 2022-05-11 DIAGNOSIS — J45.40 MODERATE PERSISTENT ASTHMA, UNSPECIFIED WHETHER COMPLICATED: ICD-10-CM

## 2022-05-11 DIAGNOSIS — I50.42 CHRONIC COMBINED SYSTOLIC AND DIASTOLIC HEART FAILURE: ICD-10-CM

## 2022-05-11 DIAGNOSIS — I44.7 LBBB (LEFT BUNDLE BRANCH BLOCK): ICD-10-CM

## 2022-05-11 DIAGNOSIS — E11.59 HYPERTENSION ASSOCIATED WITH DIABETES: ICD-10-CM

## 2022-05-11 DIAGNOSIS — I44.2 COMPLETE HEART BLOCK: ICD-10-CM

## 2022-05-11 DIAGNOSIS — G47.33 OSA (OBSTRUCTIVE SLEEP APNEA): ICD-10-CM

## 2022-05-11 DIAGNOSIS — I50.43 ACUTE ON CHRONIC COMBINED SYSTOLIC AND DIASTOLIC HEART FAILURE: ICD-10-CM

## 2022-05-11 DIAGNOSIS — I42.8 NONISCHEMIC CARDIOMYOPATHY: ICD-10-CM

## 2022-05-11 DIAGNOSIS — Z95.810 BIVENTRICULAR ICD (IMPLANTABLE CARDIOVERTER-DEFIBRILLATOR) IN PLACE: ICD-10-CM

## 2022-05-11 DIAGNOSIS — I15.2 HYPERTENSION ASSOCIATED WITH DIABETES: ICD-10-CM

## 2022-05-11 DIAGNOSIS — N18.4 CKD (CHRONIC KIDNEY DISEASE) STAGE 4, GFR 15-29 ML/MIN: ICD-10-CM

## 2022-05-11 DIAGNOSIS — I25.5 CARDIOMYOPATHY, ISCHEMIC: Primary | ICD-10-CM

## 2022-05-11 DIAGNOSIS — E11.42 DIABETIC POLYNEUROPATHY ASSOCIATED WITH TYPE 2 DIABETES MELLITUS: ICD-10-CM

## 2022-05-11 PROCEDURE — 3074F PR MOST RECENT SYSTOLIC BLOOD PRESSURE < 130 MM HG: ICD-10-PCS | Mod: CPTII,S$GLB,, | Performed by: INTERNAL MEDICINE

## 2022-05-11 PROCEDURE — 1101F PT FALLS ASSESS-DOCD LE1/YR: CPT | Mod: CPTII,S$GLB,, | Performed by: INTERNAL MEDICINE

## 2022-05-11 PROCEDURE — 3078F DIAST BP <80 MM HG: CPT | Mod: CPTII,S$GLB,, | Performed by: INTERNAL MEDICINE

## 2022-05-11 PROCEDURE — 93010 RHYTHM STRIP: ICD-10-PCS | Mod: S$GLB,,, | Performed by: INTERNAL MEDICINE

## 2022-05-11 PROCEDURE — 1126F PR PAIN SEVERITY QUANTIFIED, NO PAIN PRESENT: ICD-10-PCS | Mod: CPTII,S$GLB,, | Performed by: INTERNAL MEDICINE

## 2022-05-11 PROCEDURE — 3288F FALL RISK ASSESSMENT DOCD: CPT | Mod: CPTII,S$GLB,, | Performed by: INTERNAL MEDICINE

## 2022-05-11 PROCEDURE — 3288F PR FALLS RISK ASSESSMENT DOCUMENTED: ICD-10-PCS | Mod: CPTII,S$GLB,, | Performed by: INTERNAL MEDICINE

## 2022-05-11 PROCEDURE — 93284 CARDIAC DEVICE CHECK - IN CLINIC & HOSPITAL: ICD-10-PCS | Mod: 26,,, | Performed by: INTERNAL MEDICINE

## 2022-05-11 PROCEDURE — 93284 PRGRMG EVAL IMPLANTABLE DFB: CPT

## 2022-05-11 PROCEDURE — 93005 ELECTROCARDIOGRAM TRACING: CPT | Mod: S$GLB,,, | Performed by: INTERNAL MEDICINE

## 2022-05-11 PROCEDURE — 1159F PR MEDICATION LIST DOCUMENTED IN MEDICAL RECORD: ICD-10-PCS | Mod: CPTII,S$GLB,, | Performed by: INTERNAL MEDICINE

## 2022-05-11 PROCEDURE — 1101F PR PT FALLS ASSESS DOC 0-1 FALLS W/OUT INJ PAST YR: ICD-10-PCS | Mod: CPTII,S$GLB,, | Performed by: INTERNAL MEDICINE

## 2022-05-11 PROCEDURE — 93010 ELECTROCARDIOGRAM REPORT: CPT | Mod: S$GLB,,, | Performed by: INTERNAL MEDICINE

## 2022-05-11 PROCEDURE — 3078F PR MOST RECENT DIASTOLIC BLOOD PRESSURE < 80 MM HG: ICD-10-PCS | Mod: CPTII,S$GLB,, | Performed by: INTERNAL MEDICINE

## 2022-05-11 PROCEDURE — 99215 OFFICE O/P EST HI 40 MIN: CPT | Mod: S$GLB,,, | Performed by: INTERNAL MEDICINE

## 2022-05-11 PROCEDURE — 93005 RHYTHM STRIP: ICD-10-PCS | Mod: S$GLB,,, | Performed by: INTERNAL MEDICINE

## 2022-05-11 PROCEDURE — 99999 PR PBB SHADOW E&M-EST. PATIENT-LVL IV: ICD-10-PCS | Mod: PBBFAC,,, | Performed by: INTERNAL MEDICINE

## 2022-05-11 PROCEDURE — 1126F AMNT PAIN NOTED NONE PRSNT: CPT | Mod: CPTII,S$GLB,, | Performed by: INTERNAL MEDICINE

## 2022-05-11 PROCEDURE — 99999 PR PBB SHADOW E&M-EST. PATIENT-LVL IV: CPT | Mod: PBBFAC,,, | Performed by: INTERNAL MEDICINE

## 2022-05-11 PROCEDURE — 1159F MED LIST DOCD IN RCRD: CPT | Mod: CPTII,S$GLB,, | Performed by: INTERNAL MEDICINE

## 2022-05-11 PROCEDURE — 93284 PRGRMG EVAL IMPLANTABLE DFB: CPT | Mod: 26,,, | Performed by: INTERNAL MEDICINE

## 2022-05-11 PROCEDURE — 3074F SYST BP LT 130 MM HG: CPT | Mod: CPTII,S$GLB,, | Performed by: INTERNAL MEDICINE

## 2022-05-11 PROCEDURE — 99215 PR OFFICE/OUTPT VISIT, EST, LEVL V, 40-54 MIN: ICD-10-PCS | Mod: S$GLB,,, | Performed by: INTERNAL MEDICINE

## 2022-05-16 ENCOUNTER — LAB VISIT (OUTPATIENT)
Dept: LAB | Facility: HOSPITAL | Age: 82
End: 2022-05-16
Attending: INTERNAL MEDICINE
Payer: MEDICARE

## 2022-05-16 DIAGNOSIS — D63.1 ANEMIA IN STAGE 4 CHRONIC KIDNEY DISEASE: ICD-10-CM

## 2022-05-16 DIAGNOSIS — N18.4 ANEMIA IN STAGE 4 CHRONIC KIDNEY DISEASE: ICD-10-CM

## 2022-05-16 DIAGNOSIS — N18.4 CKD (CHRONIC KIDNEY DISEASE) STAGE 4, GFR 15-29 ML/MIN: ICD-10-CM

## 2022-05-16 LAB
25(OH)D3+25(OH)D2 SERPL-MCNC: 36 NG/ML (ref 30–96)
BASOPHILS # BLD AUTO: 0.06 K/UL (ref 0–0.2)
BASOPHILS NFR BLD: 0.6 % (ref 0–1.9)
DIFFERENTIAL METHOD: ABNORMAL
EOSINOPHIL # BLD AUTO: 0.5 K/UL (ref 0–0.5)
EOSINOPHIL NFR BLD: 5.5 % (ref 0–8)
ERYTHROCYTE [DISTWIDTH] IN BLOOD BY AUTOMATED COUNT: 13.2 % (ref 11.5–14.5)
FERRITIN SERPL-MCNC: 84 NG/ML (ref 20–300)
HCT VFR BLD AUTO: 26.3 % (ref 37–48.5)
HGB BLD-MCNC: 8.1 G/DL (ref 12–16)
IMM GRANULOCYTES # BLD AUTO: 0.13 K/UL (ref 0–0.04)
IMM GRANULOCYTES NFR BLD AUTO: 1.4 % (ref 0–0.5)
IRON SERPL-MCNC: 49 UG/DL (ref 30–160)
LYMPHOCYTES # BLD AUTO: 1.6 K/UL (ref 1–4.8)
LYMPHOCYTES NFR BLD: 17.4 % (ref 18–48)
MCH RBC QN AUTO: 27.7 PG (ref 27–31)
MCHC RBC AUTO-ENTMCNC: 30.8 G/DL (ref 32–36)
MCV RBC AUTO: 90 FL (ref 82–98)
MONOCYTES # BLD AUTO: 0.6 K/UL (ref 0.3–1)
MONOCYTES NFR BLD: 6.7 % (ref 4–15)
NEUTROPHILS # BLD AUTO: 6.4 K/UL (ref 1.8–7.7)
NEUTROPHILS NFR BLD: 68.4 % (ref 38–73)
NRBC BLD-RTO: 0 /100 WBC
PLATELET # BLD AUTO: 255 K/UL (ref 150–450)
PMV BLD AUTO: 10.1 FL (ref 9.2–12.9)
PTH-INTACT SERPL-MCNC: 437.1 PG/ML (ref 9–77)
RBC # BLD AUTO: 2.92 M/UL (ref 4–5.4)
SATURATED IRON: 12 % (ref 20–50)
TOTAL IRON BINDING CAPACITY: 410 UG/DL (ref 250–450)
TRANSFERRIN SERPL-MCNC: 277 MG/DL (ref 200–375)
URATE SERPL-MCNC: 9.7 MG/DL (ref 2.4–5.7)
WBC # BLD AUTO: 9.39 K/UL (ref 3.9–12.7)

## 2022-05-16 PROCEDURE — 82728 ASSAY OF FERRITIN: CPT | Performed by: INTERNAL MEDICINE

## 2022-05-16 PROCEDURE — 36415 COLL VENOUS BLD VENIPUNCTURE: CPT | Performed by: INTERNAL MEDICINE

## 2022-05-16 PROCEDURE — 82306 VITAMIN D 25 HYDROXY: CPT | Performed by: INTERNAL MEDICINE

## 2022-05-16 PROCEDURE — 84550 ASSAY OF BLOOD/URIC ACID: CPT | Performed by: INTERNAL MEDICINE

## 2022-05-16 PROCEDURE — 85025 COMPLETE CBC W/AUTO DIFF WBC: CPT | Performed by: INTERNAL MEDICINE

## 2022-05-16 PROCEDURE — 83970 ASSAY OF PARATHORMONE: CPT | Performed by: INTERNAL MEDICINE

## 2022-05-16 PROCEDURE — 84466 ASSAY OF TRANSFERRIN: CPT | Performed by: INTERNAL MEDICINE

## 2022-05-17 ENCOUNTER — DOCUMENT SCAN (OUTPATIENT)
Dept: HOME HEALTH SERVICES | Facility: HOSPITAL | Age: 82
End: 2022-05-17
Payer: MEDICARE

## 2022-05-23 ENCOUNTER — DOCUMENT SCAN (OUTPATIENT)
Dept: HOME HEALTH SERVICES | Facility: HOSPITAL | Age: 82
End: 2022-05-23
Payer: MEDICARE

## 2022-05-23 ENCOUNTER — OFFICE VISIT (OUTPATIENT)
Dept: OPHTHALMOLOGY | Facility: CLINIC | Age: 82
End: 2022-05-23
Payer: MEDICARE

## 2022-05-23 DIAGNOSIS — E11.3213 CONTROLLED TYPE 2 DIABETES MELLITUS WITH BOTH EYES AFFECTED BY MILD NONPROLIFERATIVE RETINOPATHY AND MACULAR EDEMA, WITH LONG-TERM CURRENT USE OF INSULIN: Primary | ICD-10-CM

## 2022-05-23 DIAGNOSIS — H35.033 HYPERTENSIVE RETINOPATHY, BILATERAL: ICD-10-CM

## 2022-05-23 DIAGNOSIS — H35.012 RETINAL MACROANEURYSM OF LEFT EYE: ICD-10-CM

## 2022-05-23 DIAGNOSIS — Z79.4 CONTROLLED TYPE 2 DIABETES MELLITUS WITH BOTH EYES AFFECTED BY MILD NONPROLIFERATIVE RETINOPATHY AND MACULAR EDEMA, WITH LONG-TERM CURRENT USE OF INSULIN: Primary | ICD-10-CM

## 2022-05-23 DIAGNOSIS — H43.12 VITREOUS HEMORRHAGE, LEFT: ICD-10-CM

## 2022-05-23 PROCEDURE — 1159F MED LIST DOCD IN RCRD: CPT | Mod: CPTII,S$GLB,, | Performed by: OPHTHALMOLOGY

## 2022-05-23 PROCEDURE — 92014 COMPRE OPH EXAM EST PT 1/>: CPT | Mod: 25,S$GLB,, | Performed by: OPHTHALMOLOGY

## 2022-05-23 PROCEDURE — 1160F RVW MEDS BY RX/DR IN RCRD: CPT | Mod: CPTII,S$GLB,, | Performed by: OPHTHALMOLOGY

## 2022-05-23 PROCEDURE — 99999 PR PBB SHADOW E&M-EST. PATIENT-LVL II: ICD-10-PCS | Mod: PBBFAC,,, | Performed by: OPHTHALMOLOGY

## 2022-05-23 PROCEDURE — 1160F PR REVIEW ALL MEDS BY PRESCRIBER/CLIN PHARMACIST DOCUMENTED: ICD-10-PCS | Mod: CPTII,S$GLB,, | Performed by: OPHTHALMOLOGY

## 2022-05-23 PROCEDURE — 1159F PR MEDICATION LIST DOCUMENTED IN MEDICAL RECORD: ICD-10-PCS | Mod: CPTII,S$GLB,, | Performed by: OPHTHALMOLOGY

## 2022-05-23 PROCEDURE — 67028 PR INJECT INTRAVITREAL PHARMCOLOGIC: ICD-10-PCS | Mod: LT,S$GLB,, | Performed by: OPHTHALMOLOGY

## 2022-05-23 PROCEDURE — 99499 UNLISTED E&M SERVICE: CPT | Mod: S$PBB,,, | Performed by: OPHTHALMOLOGY

## 2022-05-23 PROCEDURE — 92134 CPTRZ OPH DX IMG PST SGM RTA: CPT | Mod: S$GLB,,, | Performed by: OPHTHALMOLOGY

## 2022-05-23 PROCEDURE — 99499 RISK ADDL DX/OHS AUDIT: ICD-10-PCS | Mod: S$PBB,,, | Performed by: OPHTHALMOLOGY

## 2022-05-23 PROCEDURE — 67028 INJECTION EYE DRUG: CPT | Mod: LT,S$GLB,, | Performed by: OPHTHALMOLOGY

## 2022-05-23 PROCEDURE — 92014 PR EYE EXAM, EST PATIENT,COMPREHESV: ICD-10-PCS | Mod: 25,S$GLB,, | Performed by: OPHTHALMOLOGY

## 2022-05-23 PROCEDURE — 99999 PR PBB SHADOW E&M-EST. PATIENT-LVL II: CPT | Mod: PBBFAC,,, | Performed by: OPHTHALMOLOGY

## 2022-05-23 PROCEDURE — 92134 POSTERIOR SEGMENT OCT RETINA (OCULAR COHERENCE TOMOGRAPHY)-BOTH EYES: ICD-10-PCS | Mod: S$GLB,,, | Performed by: OPHTHALMOLOGY

## 2022-05-23 RX ADMIN — Medication 1.25 MG: at 01:05

## 2022-05-23 NOTE — PROGRESS NOTES
HPI     Early FU due to new onset of eye problem   DLS 02/10/2022 Dr. Arechiga     Friday afternoon she bent over and the noticed a bunch of floaters which   looked like Logs and squiggly lines in vision. Also Black spot in vision   appeared this all has been constant since started.   Denies pain   (-)Flashes (+)Floaters  (-)Photophobia  (-)Glare    Redeye gtts PRN       HPI     Pt is here today for 6 month Diabetic Retinopathy f/u.  She states her vision seems stable since her last visit.    No current eye meds    Last edited by Perry Jara on 10/8/2021  2:37 PM. (History)          OCT - OD trace parafoveal ME  OS - central cystoid edema - small increase    Prior FA - Foveal MA's OS   No NV of NP OU      Lost  to COVID complications 12/20    A/P    1. 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 DME OS  S/p Avastin OS x 5  S/p Ozurdex OS x 5 9/19  S/p Iluvien 9/19    Doing well, will need chronic steroid  - may need additional Iluvien OS soon  Watch OD fornow    Can consider light focal to parafoveal MA's OS if worsens      3. HTN Ret OU  BS/BP/chol control  5/22 TORSTEN OS with SRF and preretinal heme    Avastin OS today    4. PCIOL OU  With small PCO      6 weeks OCT and dilate OS      Risks, benefits, and alternatives to treatment discussed in detail with the patient.  The patient voiced understanding and wished to proceed with the procedure    Injection Procedure Note:  Diagnosis: TORSTEN with VH and ME OS    Patient Identified and Time Out complete  Pt marked  Topical Proparacaine and Betadine.  Inject Avastin OS at 6:00 @ 3.5-4mm posterior to limbus  Post Operative Dx: Same  Complications: None  Follow up as above.

## 2022-05-24 LAB
ACID FAST MOD KINY STN SPEC: NORMAL
MYCOBACTERIUM SPEC QL CULT: NORMAL

## 2022-05-27 ENCOUNTER — OFFICE VISIT (OUTPATIENT)
Dept: UROLOGY | Facility: CLINIC | Age: 82
End: 2022-05-27
Payer: MEDICARE

## 2022-05-27 VITALS
DIASTOLIC BLOOD PRESSURE: 56 MMHG | HEIGHT: 63 IN | WEIGHT: 136.25 LBS | HEART RATE: 64 BPM | BODY MASS INDEX: 24.14 KG/M2 | SYSTOLIC BLOOD PRESSURE: 138 MMHG

## 2022-05-27 DIAGNOSIS — R33.9 URINARY RETENTION: Primary | ICD-10-CM

## 2022-05-27 DIAGNOSIS — N81.9 VAGINAL VAULT PROLAPSE: ICD-10-CM

## 2022-05-27 PROCEDURE — 3078F DIAST BP <80 MM HG: CPT | Mod: CPTII,S$GLB,, | Performed by: UROLOGY

## 2022-05-27 PROCEDURE — 3075F SYST BP GE 130 - 139MM HG: CPT | Mod: CPTII,S$GLB,, | Performed by: UROLOGY

## 2022-05-27 PROCEDURE — 1160F PR REVIEW ALL MEDS BY PRESCRIBER/CLIN PHARMACIST DOCUMENTED: ICD-10-PCS | Mod: CPTII,S$GLB,, | Performed by: UROLOGY

## 2022-05-27 PROCEDURE — 99999 PR PBB SHADOW E&M-EST. PATIENT-LVL IV: CPT | Mod: PBBFAC,,, | Performed by: UROLOGY

## 2022-05-27 PROCEDURE — 99999 PR PBB SHADOW E&M-EST. PATIENT-LVL IV: ICD-10-PCS | Mod: PBBFAC,,, | Performed by: UROLOGY

## 2022-05-27 PROCEDURE — 3075F PR MOST RECENT SYSTOLIC BLOOD PRESS GE 130-139MM HG: ICD-10-PCS | Mod: CPTII,S$GLB,, | Performed by: UROLOGY

## 2022-05-27 PROCEDURE — 99213 PR OFFICE/OUTPT VISIT, EST, LEVL III, 20-29 MIN: ICD-10-PCS | Mod: S$GLB,,, | Performed by: UROLOGY

## 2022-05-27 PROCEDURE — 3288F PR FALLS RISK ASSESSMENT DOCUMENTED: ICD-10-PCS | Mod: CPTII,S$GLB,, | Performed by: UROLOGY

## 2022-05-27 PROCEDURE — 99499 UNLISTED E&M SERVICE: CPT | Mod: S$GLB,,, | Performed by: UROLOGY

## 2022-05-27 PROCEDURE — 1126F PR PAIN SEVERITY QUANTIFIED, NO PAIN PRESENT: ICD-10-PCS | Mod: CPTII,S$GLB,, | Performed by: UROLOGY

## 2022-05-27 PROCEDURE — 1126F AMNT PAIN NOTED NONE PRSNT: CPT | Mod: CPTII,S$GLB,, | Performed by: UROLOGY

## 2022-05-27 PROCEDURE — 3288F FALL RISK ASSESSMENT DOCD: CPT | Mod: CPTII,S$GLB,, | Performed by: UROLOGY

## 2022-05-27 PROCEDURE — 99213 OFFICE O/P EST LOW 20 MIN: CPT | Mod: S$GLB,,, | Performed by: UROLOGY

## 2022-05-27 PROCEDURE — 3078F PR MOST RECENT DIASTOLIC BLOOD PRESSURE < 80 MM HG: ICD-10-PCS | Mod: CPTII,S$GLB,, | Performed by: UROLOGY

## 2022-05-27 PROCEDURE — 1101F PR PT FALLS ASSESS DOC 0-1 FALLS W/OUT INJ PAST YR: ICD-10-PCS | Mod: CPTII,S$GLB,, | Performed by: UROLOGY

## 2022-05-27 PROCEDURE — 1160F RVW MEDS BY RX/DR IN RCRD: CPT | Mod: CPTII,S$GLB,, | Performed by: UROLOGY

## 2022-05-27 PROCEDURE — 1159F MED LIST DOCD IN RCRD: CPT | Mod: CPTII,S$GLB,, | Performed by: UROLOGY

## 2022-05-27 PROCEDURE — 99499 RISK ADDL DX/OHS AUDIT: ICD-10-PCS | Mod: S$GLB,,, | Performed by: UROLOGY

## 2022-05-27 PROCEDURE — 1159F PR MEDICATION LIST DOCUMENTED IN MEDICAL RECORD: ICD-10-PCS | Mod: CPTII,S$GLB,, | Performed by: UROLOGY

## 2022-05-27 PROCEDURE — 1101F PT FALLS ASSESS-DOCD LE1/YR: CPT | Mod: CPTII,S$GLB,, | Performed by: UROLOGY

## 2022-05-27 NOTE — PROGRESS NOTES
Subjective:       Patient ID: Myesha Quinones is a 82 y.o. female.    Chief Complaint: No chief complaint on file.     This is a 82 y.o.  female patient that is an established patient.   Has multiple medical problems including severe COPD as well as heart failure, CKD 4.  She was recently admitted to the hospital exacerbation of lower extremity edema/CHF/COPD at which point she received diuretics and required intermittent catheterization for large volume urine output.  Following that she had a indwelling Lauren catheter is placed is been indwelling since February 28th 2022. She is tolerating the catheter okay.  She denies history of incomplete emptying, incontinence, hematuria, dysuria or recurrent urinary tract infections.    I personally reviewed the images: MILAD 2/2022: left AML, Right adrenal nodule, MRD, no hydronephrosis    3/16/22: here for void trial but having some hematuria, lauren is not secured.    3/18/22: here for void trial, urine cleared  4/1/22: reports voiding, since Monday with frequency/dysuria.   PVR >600    5/27/22:  Seen by Dr. Manuel, to have pessary fitted and placed soon, non operative candidate. Tolerating lauren.           Lab Results   Component Value Date    CREATININE 2.5 (H) 04/11/2022       ---  Past Medical History:   Diagnosis Date    Acute hypoxemic respiratory failure 12/19/2019    Acute right-sided thoracic back pain 12/09/2019    Allergy     Asthma     Basal cell carcinoma     left forehead    Basal cell carcinoma     left nose    Basal cell carcinoma 05/20/2015    right nose    Basal cell carcinoma 12/22/2015    left lower post neck    Basal cell carcinoma 12/03/2019    left ant scalp     Bilateral renal cysts     Breast cancer     CAD (coronary artery disease)     Cardiomyopathy     Cardiomyopathy, ischemic     Cataract     CHF (congestive heart failure)     CKD (chronic kidney disease) stage 4, GFR 15-29 ml/min     Colon polyp 2011    Controlled type 2  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, with long-term current use of insulin 02/22/2018    COPD (chronic obstructive pulmonary disease)     COPD exacerbation 04/08/2018    Current mild episode of major depressive disorder without prior episode 01/25/2022    Defibrillator discharge     Diabetes mellitus     Diabetes mellitus type II     Diabetes with neurologic complications     Edema     Goiter     MNG    Hematuria, unspecified     HX: breast cancer     Hyperlipidemia     Hypertension     Hypocalcemia     Hypokalemia     Hyponatremia     Hyponatremia 4/2/2022    Iron deficiency anemia 05/16/2017    Iron deficiency anemia     Left kidney mass     Meningioma     Microalbuminuria due to type 2 diabetes mellitus 01/26/2022    Osteoporosis, postmenopausal     Pneumonia 12/08/2019    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    Proteinuria 01/21/2019    Pseudomonas pneumonia     Skin cancer     s/p excision    Sleep apnea     CPAP    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/2015    mid forehead    Unspecified vitamin D deficiency     UTI (urinary tract infection) 04/02/2022    Ventricular tachycardia     Vitamin B12 deficiency     Vitamin D deficiency disease        Past Surgical History:   Procedure Laterality Date    BASAL CELL CARCINOMA EXCISION      posterior neck and nose    BREAST BIOPSY      BREAST CYST EXCISION Left     BREAST SURGERY      CARDIAC DEFIBRILLATOR PLACEMENT      x 2    CATARACT EXTRACTION W/  INTRAOCULAR LENS IMPLANT Bilateral     CHOLECYSTECTOMY      COLONOSCOPY N/A 11/5/2019    Procedure: COLONOSCOPY;  Surgeon: Boaz Botello MD;  Location: [facility] ([address] [place] [address]);  Service: Endoscopy;  Laterality: N/A;  AICD - Medtronic -     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 EP LAB;  Service: Cardiology;  Laterality: N/A;  DONNA, CRT-D gen change, MDT, MAC, SK, 3 Prep    REVISION OF SKIN POCKET FOR CARDIOVERTER-DEFIBRILLATOR  12/17/2018    Procedure: Revision, Skin Pocket, For Cardioverter-Defibrillator;  Surgeon: Jan Mckeon MD;  Location: [facility] EP LAB;  Service: Cardiology;;    SQUAMOUS CELL CARCINOMA EXCISION      remved from forehead    TONSILLECTOMY         Family History   Problem Relation Age of Onset    Diabetes Father     Heart disease Father     Diabetes Sister     Heart disease Sister     Diabetes Brother     Heart disease Brother     Hypertension Brother     Diabetes Brother     Heart disease Brother     Hypertension Brother     Diabetes Brother     Heart disease Brother     Cancer Brother         colon    Diabetes Brother     Cancer Son         skin    Diabetes Son         prediabetes    Diabetes Daughter         prediabetes    Cancer Daughter         melanoma    Obesity Daughter     Melanoma Daughter     Diabetes Son     Asthma Mother     Hypertension Mother     Stroke Mother     No Known Problems Maternal Grandmother     No Known Problems Maternal Grandfather     No Known Problems Paternal Grandmother     No Known Problems Paternal Grandfather     Amblyopia Neg Hx     Blindness Neg Hx     Cataracts Neg Hx     Glaucoma Neg Hx     Macular degeneration Neg Hx     Retinal detachment Neg Hx     Strabismus Neg Hx     Thyroid disease Neg Hx        Social History     Tobacco Use    Smoking status: Never Smoker    Smokeless tobacco: Never Used   Substance Use Topics    Alcohol use: No     Alcohol/week: 0.0 standard drinks    Drug use: No       Current Outpatient Medications on File Prior to Visit   Medication Sig Dispense Refill    albuterol (PROVENTIL/VENTOLIN HFA) 90 mcg/actuation inhaler INHALE  2 PUFFS INTO THE LUNGS EVERY 6 (SIX) HOURS AS NEEDED FOR WHEEZING. RESCUE 18 g 12    albuterol-ipratropium (DUO-NEB) 2.5 mg-0.5 mg/3 mL nebulizer solution TAKE 3 MLS BY NEBULIZATION EVERY 4 (FOUR) HOURS AS NEEDED FOR WHEEZING. 270 mL 12    benzonatate (TESSALON) 100 MG capsule Take 1 capsule (100 mg total) by mouth 3 (three) times daily as needed for Cough. 20 capsule 0    blood sugar diagnostic (ACCU-CHEK HECTOR) Strp Uses Accu-Check Hector meter to test BG 4x/day 400 strip 6    carvediloL (COREG) 25 MG tablet TAKE 2 TABLETS (50 MG TOTAL) BY MOUTH 2 (TWO) TIMES DAILY. 360 tablet 3    cholecalciferol, vitamin D3, (VITAMIN D3) 50 mcg (2,000 unit) Tab Take 1 tablet by mouth once daily.       dulaglutide (TRULICITY) 0.75 mg/0.5 mL pen injector Inject 0.75 mg into the skin every 7 days. 4 pen 11    estradioL (ESTRACE) 0.01 % (0.1 mg/gram) vaginal cream Place 1 g vaginally every Mon, Wed, Fri. 153 g 6    fluticasone propionate (FLONASE) 50 mcg/actuation nasal spray 2 sprays (100 mcg total) by Each Nostril route once daily. (Patient taking differently: 2 sprays by Each Nostril route daily as needed.) 16 g 12    fluticasone-salmeterol diskus inhaler 250-50 mcg Inhale 1 puff into the lungs as needed. Controller      furosemide (LASIX) 40 MG tablet Take 1 tablet (40 mg total) by mouth every 8 (eight) hours as needed (swelling). 90 tablet 3    hydrALAZINE (APRESOLINE) 100 MG tablet Take 1 tablet (100 mg total) by mouth 2 (two) times daily. 180 tablet 3    insulin (LANTUS SOLOSTAR U-100 INSULIN) glargine 100 units/mL (3mL) SubQ pen Inject 22 Units into the skin.      lancets (ACCU-CHEK SOFTCLIX LANCETS) Misc Uses Accu-Chek Hector meter to test BG 1x/day 400 each 3    levocetirizine (XYZAL) 5 MG tablet Take 5 mg by mouth as needed.      magnesium oxide (MAG-OX) 400 mg tablet Take 1 tablet (400 mg total) by mouth once daily.  0    montelukast (SINGULAIR) 10 mg tablet Take 1 tablet (10 mg total) by mouth every  "evening. (Patient taking differently: Take 10 mg by mouth daily as needed.) 90 tablet 3    nitroGLYCERIN (NITROSTAT) 0.4 MG SL tablet Place 0.4 mg under the tongue every 5 (five) minutes as needed for Chest pain.       omega-3 fatty acids 500 mg Cap Take 1 capsule by mouth Twice daily.      pen needle, diabetic (BD ULTRA-FINE MINI PEN NEEDLE) 31 gauge x 3/16" Ndle USE WITH LANTUS AT BEDTIME 400 each 3    polyethylene glycol (GLYCOLAX) 17 gram PwPk Take 17 g by mouth daily as needed (constipation). 10 each 1    pravastatin (PRAVACHOL) 40 MG tablet Take 1 tablet (40 mg total) by mouth once daily. 90 tablet 3    pulse oximeter (PULSE OXIMETER) device by Apply Externally route 2 (two) times a day. Use twice daily at 8 AM and 3 PM and record the value in MyChart as directed. 1 each 0    sodium bicarbonate 650 MG tablet Take 2 tablets (1,300 mg total) by mouth 3 (three) times daily. 180 tablet 11    sodium chloride 3% 3 % nebulizer solution TAKE 4 MLS BY NEBULIZATION 4 (FOUR) TIMES DAILY AS NEEDED FOR OTHER OR COUGH (TO INDUCE SPUTUM AND CLEAR MUCOUS.). 750 mL 11    tiotropium (SPIRIVA) 18 mcg inhalation capsule Inhale 1 capsule (18 mcg total) into the lungs once daily. Controller 90 capsule 3    valsartan (DIOVAN) 80 MG tablet Take 1 tablet (80 mg total) by mouth 2 (two) times daily. 180 tablet 3     No current facility-administered medications on file prior to visit.       Review of patient's allergies indicates:   Allergen Reactions    Iodine and iodide containing products Hives    Nifedipine      weakness       Review of Systems   Constitutional: Negative for activity change, chills, fatigue and fever.   Respiratory: Positive for shortness of breath. Negative for cough and chest tightness.    Cardiovascular: Negative for chest pain.   Gastrointestinal: Negative for abdominal distention, abdominal pain, nausea and vomiting.   Genitourinary: Positive for difficulty urinating. Negative for flank pain, " hematuria and pelvic pain.   Musculoskeletal: Negative for back pain and gait problem.       Objective:      Physical Exam  HENT:      Head: Normocephalic.   Cardiovascular:      Pulses: Normal pulses.   Pulmonary:      Effort: Pulmonary effort is normal.   Abdominal:      General: Abdomen is flat. There is no distension.      Palpations: Abdomen is soft.      Tenderness: There is no abdominal tenderness. There is no right CVA tenderness, left CVA tenderness or guarding.   Genitourinary:     Comments: Lauren clear   Musculoskeletal:      Cervical back: Normal range of motion.   Skin:     General: Skin is warm.   Neurological:      General: No focal deficit present.      Mental Status: She is alert.         Assessment:     Problem Noted   Urinary Retention 2/28/2022    82 yo F with urinary retention.  Lauren placed 2/28/22.      Ckd (Chronic Kidney Disease) Stage 4, Gfr 15-29 Ml/Min 7/27/2020     Plan:     1. Replace lauren, 16F lauren by Nurse Catherine  2. Pessary fitting soon  3. Follow up in 1 month for void trial after pessary in place    Steffen Osborn MD

## 2022-05-27 NOTE — PROGRESS NOTES
Lauren exchange.  Under sterile technique, 16 Fr lauren inserted into urinary bladder without difficulty.  Clear, yellow urine output.  Balloon inflated with 10 cc of NS.  Stat-lock placed on left thigh,  leg bag requested and connected to left thigh.  Request for large urinary bag-given.  Patient teaching provided.  Patient tolerated well.  Follow up appointment scheduled-handed to patient.

## 2022-05-31 NOTE — PROGRESS NOTES
Subjective:      Patient ID: Myesha Quinones is a 82 y.o. female.    Chief Complaint:  Diabetes    History of Present Illness  Myesha Quinones is here for follow up of T2DM, MNG, osteoporosis and adrenal nodule.  Previously seen by me on 2/2022.    Patient arrives with her son.  Since LOV she was in the hospital x2 - reports she received steroids while in the hospital.    With regards to diabetes:    Diagnosed: 1992  Known complications:  DKA -  RN +  PN +  Nephropathy + follows with nephrology  CAD +    Current regimen:  Lantus 22 units daily     Other medications tried:  Metformin - CKD   Tradjenta  Farxiga - never started  Trulicity - cost    Glucose Monitor:   2 times a day testing  Log reviewed: log provided and reviewed, scanned in media tab    Hypoglycemia:  Denies  Knows how to correct with 15 grams of carbs- juice, coke, or a peppermint.     Diet/Exercise:  Eats 3 meals a day.   B: oatmeal, 1/2 banana / cereal/ egg  L: humana prepared meals - meat, vegetable, rice or pasta (small portion)  D: prepared meal / meat, vegetable  Snacks : occasionally - sweets.   Drinks : water, coffee, NO soft drinks.  Exercise: None.      Education - last visit: in the past  Eye Exam: 10/2021  Podiatry: 11/2019    Diabetes Management Status    Hemoglobin A1C   Date Value Ref Range Status   02/22/2022 5.9 (H) 4.0 - 5.6 % Final     Comment:     ADA Screening Guidelines:  5.7-6.4%  Consistent with prediabetes  >or=6.5%  Consistent with diabetes    High levels of fetal hemoglobin interfere with the HbA1C  assay. Heterozygous hemoglobin variants (HbS, HgC, etc)do  not significantly interfere with this assay.   However, presence of multiple variants may affect accuracy.     02/08/2022 5.7 (H) 4.0 - 5.6 % Final     Comment:     ADA Screening Guidelines:  5.7-6.4%  Consistent with prediabetes  >or=6.5%  Consistent with diabetes    High levels of fetal hemoglobin interfere with the HbA1C  assay. Heterozygous hemoglobin  variants (HbS, HgC, etc)do  not significantly interfere with this assay.   However, presence of multiple variants may affect accuracy.     01/25/2022 5.8 (H) 4.0 - 5.6 % Final     Comment:     ADA Screening Guidelines:  5.7-6.4%  Consistent with prediabetes  >or=6.5%  Consistent with diabetes    High levels of fetal hemoglobin interfere with the HbA1C  assay. Heterozygous hemoglobin variants (HbS, HgC, etc)do  not significantly interfere with this assay.   However, presence of multiple variants may affect accuracy.         Statin: Taking  ACE/ARB: Taking  Screening or Prevention Patient's value Goal Complete/Controlled?   HgA1C Testing and Control   Lab Results   Component Value Date    HGBA1C 5.9 (H) 02/22/2022      Annually/Less than 8% Yes   Lipid profile : 04/03/2022 Annually Yes   LDL control Lab Results   Component Value Date    LDLCALC 29.8 (L) 04/03/2022    Annually/Less than 100 mg/dl  Yes   Nephropathy screening Lab Results   Component Value Date    LABMICR 542.0 04/03/2022     Lab Results   Component Value Date    PROTEINUA 3+ (A) 04/02/2022    Annually Yes   Blood pressure BP Readings from Last 1 Encounters:   06/06/22 (!) 150/73    Less than 140/90 No   Dilated retinal exam : 05/23/2022 Annually Yes   Foot exam   : 11/06/2019 Annually No     With regards to thyroid nodule:    Thyroid US:   11/19/2020  1.  Thyroid gland is normal in size with heterogenous echotexture.  2.  Two nodules in the right thyroid described above.  None meet criteria for fine-needle aspiration.  3.  Left superior lobe nodule is unchanged.  It does not meet criteria for fine-needle aspiration.  RECOMMENDATIONS:  Follow-up ultrasound in 1-2 years is recommended    FNA:   Left nodule: Benign - 2016  Right nodule: Benign - 2014    Signs or Symptoms:   Difficulty breathing: Denies  Difficulty swallowing: Denies  Voice Changes: Hoarse  FH of thyroid cancer: Denies  Personal history of radiation treatment or exposure: Denies     Lab  "Results   Component Value Date    TSH 0.696 04/02/2022    FREET4 1.22 09/16/2016       Denies signs or symptoms of hyper or hypothyroidism.    With regards to osteoporosis:     BMD:   4/30/2018  Osteopenia of the femoral neck and lumbar spine -- FRAX score supports treatment  RECOMMENDATIONS of [facility]:  1.  Calcium 9349-9183 mg daily and vitamin D 800-1000 units daily, adequate exercise.  2.  Recommended therapy Consider bisphosphonates (alendronate, risedronate, ibandronate, zolendronic acid) or denosumab.  3.  Repeat BMD in 2 years    Medications:   Alendronate - did not tolerate due to bone pain.  She was also offered prolia, but she read about the side-effects and is not interested in starting it or any other therapy for osteoporosis.  Calcium intake: None  Vit D intake: OTC Vit D3 2000iu daily     Weight bearing exercise: None  Falls: Denies  Fractures: Denies    With regards to adrenal nodule:     Was found to have bilateral adrenal nodules found incidentally on CT scan "many" years ago. She reports she was initially having yearly CT scans to follow them, but it was recommended to stop doing them due to lack of growth. There are two CT scans in our system (2016 and 2017).     CT Abd Pel WO Con  12/7/2017  A right adrenal nodule is stable in size when compared to 3/10/16, measuring 2.5 cm.  A left adrenal nodule is slightly larger on today's examination measuring 2.0 cm, previously 1.7 cm.    12/7/2019  There is a left adrenal nodule measuring 2.6 x 2.1 cm, as well as a right adrenal nodule measuring 2.7 x 1.7 cm, which are stable in size and appearance when compared to CT 12/07/2017.    Reports a functional workup was done in the past. Unable to find record of this in Epic or Legacy - evaluation was done prior?    Review of Systems   Constitutional: Negative for fatigue.   Eyes: Negative for visual disturbance.   Respiratory: Positive for shortness of breath (on " "exertion).    Cardiovascular: Negative for chest pain.   Gastrointestinal: Negative for abdominal pain.   Musculoskeletal: Negative for arthralgias.   Skin: Negative for wound.   Neurological: Negative for headaches.       Objective:   Physical Exam  Vitals reviewed.   Neck:      Thyroid: No thyromegaly (irregular shaped gland).   Cardiovascular:      Rate and Rhythm: Normal rate.      Heart sounds: Heart sounds are distant.   Pulmonary:      Effort: Pulmonary effort is normal.   Abdominal:      Palpations: Abdomen is soft.   Musculoskeletal:      Right lower le+ Pitting Edema present.      Left lower le+ Pitting Edema present.         Visit Vitals  BP (!) 150/73   Pulse 65   Ht 5' 3" (1.6 m)   Wt 61.4 kg (135 lb 4 oz)   LMP  (LMP Unknown)   BMI 23.96 kg/m²     Injection sites are without edema or erythema. No lipo hypertropthy or atrophy.    Body mass index is 23.96 kg/m².    Lab Review:   Lab Results   Component Value Date    HGBA1C 5.9 (H) 2022    HGBA1C 5.7 (H) 2022    HGBA1C 5.8 (H) 2022       Lab Results   Component Value Date    CHOL 96 (L) 2022    HDL 39 (L) 2022    LDLCALC 29.8 (L) 2022    TRIG 136 2022    CHOLHDL 40.6 2022     Lab Results   Component Value Date     (L) 2022    K 4.2 2022    CL 94 (L) 2022    CO2 27 2022     (H) 2022    BUN 68 (H) 2022    CREATININE 2.5 (H) 2022    CALCIUM 8.5 (L) 2022    PROT 6.4 2022    ALBUMIN 2.9 (L) 2022    BILITOT 0.4 2022    ALKPHOS 130 2022    AST 28 2022    ALT 26 2022    ANIONGAP 14 2022    ESTGFRAFRICA 20.0 (A) 2022    EGFRNONAA 17.4 (A) 2022    TSH 0.696 2022     Vit D, 25-Hydroxy   Date Value Ref Range Status   2022 36 30 - 96 ng/mL Final     Comment:     Vitamin D deficiency.........<10 ng/mL                              Vitamin D insufficiency......10-29 ng/mL       Vitamin D " sufficiency........> or equal to 30 ng/mL  Vitamin D toxicity............>100 ng/mL       Assessment and Plan     1. 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 Nontoxic multinodular goiter     3. Adrenal cortical nodule: repeat CT 6/2016     4. Osteoporosis, unspecified osteoporosis type, unspecified pathological fracture presence     5. Diabetic polyneuropathy associated with type 2 diabetes mellitus  blood sugar diagnostic (ACCU-CHEK HECTOR) Strp    lancets (ACCU-CHEK SOFTCLIX LANCETS) Misc   6. 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 Include fructosamine.   -- A1c goal < or =7.5%.  -- Medications discussed:  MFM - stopped due to CKD  GLP1-DPP4   SARAH   SGLT2   Reviewed potential adverse effects of SGLT-2 inhibitors, including genital mycotic infections, slightly increased risk of UTI, hypersensitivity, hypotension, and hyperkalemia. Advised to maintain water intake of 8-10 cups per day. Advised we need to check chemistry panel at baseline and 2 weeks after starting. Discussed FDA warning reports of ketoacidosis associated with SGLT-2 inhibitors. Advised to seek immediate medical attention and stop the medication if symptoms such as difficulty breathing, nausea, vomiting, abdominal pain, confusion, and unusual fatigue/sleepiness. Discussed possible precipitating factors including major illness/reduced food and fluid intake (advised to stop under these circumstances),  and reduced insulin dose. Discussed possible effects of increased fracture risk/decreased bone density. Discussed reports of increased risk of leg and foot amputations with canagliflozin and need to seek urgent care if developed new pain or tenderness, sores or ulcers, or infections in legs/feet.   Insulin   -- Reviewed logs/CGM:  Some evening hyperglycemia.   Instructed to send glucose logs in 14 days.    Reach out to me sooner for any glucose <70 or consistently >200.  -- Medication Changes:   CHANGE  Lantus 22units DAILY  Tradjenta 5mg daily  -- Reviewed goals of therapy are to get the best control we can without hypoglycemia.  -- Reviewed patient's current insulin regimen. Clarified proper insulin dose and timing in relation to meals, etc. Insulin injection sites and proper rotation instructed.    -- Advised frequent self blood glucose monitoring.  Patient encouraged to document glucose results and bring them to every clinic visit.  -- Hypoglycemia precautions discussed. Instructed on precautions before driving.    -- Call for Bg repeatedly < 90 or > 180.   -- Close adherence to lifestyle changes recommended.   -- Periodic follow ups for eye evaluations, foot care and dental care suggested.    Nontoxic multinodular goiter  -- Thyroid US due 11/2022.  -- Denies compressive symptoms.  -- FNA:   Left nodule: Benign - 2016  Right nodule: Benign - 2014    Adrenal cortical nodule: repeat CT 6/2016  -- Stable on imaging for over a decade suggests benign adenomas. She thinks she had a biochemical workup in the past, but the results aren't available in our system as they predate 2004. Blood pressure is reasonably well-controlled. No hypokalemia. No symptoms to suggest pheo, and no overt Cushing syndrome. Subclinical Cushing is a possibility, but the benefit vs. risk of adrenalectomy would very likely favor conservative management. Therefore, will hold off on further testing at this point since it would be unlikely to  alter management.    Osteoporosis  -- Refused BMD due to it not changing her approach to management.  -- Patient does not wish to pursue antiresorptive treatments citing previous side-effects and wanting to avoid new medications due to fear of side-effects (Prolia specifically).  -- Taking vitamin D 2000 IU daily and she gets calcium in her diet.  -- Suggested walking for weight bearing exercise.      Follow up in about 3 months (around 9/6/2022).

## 2022-06-01 ENCOUNTER — OFFICE VISIT (OUTPATIENT)
Dept: UROGYNECOLOGY | Facility: CLINIC | Age: 82
End: 2022-06-01
Payer: MEDICARE

## 2022-06-01 VITALS
BODY MASS INDEX: 24.24 KG/M2 | HEIGHT: 63 IN | SYSTOLIC BLOOD PRESSURE: 124 MMHG | WEIGHT: 136.81 LBS | DIASTOLIC BLOOD PRESSURE: 58 MMHG

## 2022-06-01 DIAGNOSIS — Z46.89 ENCOUNTER FOR FITTING AND ADJUSTMENT OF PESSARY: ICD-10-CM

## 2022-06-01 DIAGNOSIS — R33.9 URINARY RETENTION: Primary | ICD-10-CM

## 2022-06-01 PROCEDURE — 1126F AMNT PAIN NOTED NONE PRSNT: CPT | Mod: CPTII,S$GLB,, | Performed by: PHYSICIAN ASSISTANT

## 2022-06-01 PROCEDURE — 87086 URINE CULTURE/COLONY COUNT: CPT | Performed by: PHYSICIAN ASSISTANT

## 2022-06-01 PROCEDURE — 1101F PR PT FALLS ASSESS DOC 0-1 FALLS W/OUT INJ PAST YR: ICD-10-PCS | Mod: CPTII,S$GLB,, | Performed by: PHYSICIAN ASSISTANT

## 2022-06-01 PROCEDURE — 99212 OFFICE O/P EST SF 10 MIN: CPT | Mod: 25,S$GLB,, | Performed by: PHYSICIAN ASSISTANT

## 2022-06-01 PROCEDURE — 1126F PR PAIN SEVERITY QUANTIFIED, NO PAIN PRESENT: ICD-10-PCS | Mod: CPTII,S$GLB,, | Performed by: PHYSICIAN ASSISTANT

## 2022-06-01 PROCEDURE — 99212 PR OFFICE/OUTPT VISIT, EST, LEVL II, 10-19 MIN: ICD-10-PCS | Mod: 25,S$GLB,, | Performed by: PHYSICIAN ASSISTANT

## 2022-06-01 PROCEDURE — 87088 URINE BACTERIA CULTURE: CPT | Performed by: PHYSICIAN ASSISTANT

## 2022-06-01 PROCEDURE — 3078F PR MOST RECENT DIASTOLIC BLOOD PRESSURE < 80 MM HG: ICD-10-PCS | Mod: CPTII,S$GLB,, | Performed by: PHYSICIAN ASSISTANT

## 2022-06-01 PROCEDURE — 3074F PR MOST RECENT SYSTOLIC BLOOD PRESSURE < 130 MM HG: ICD-10-PCS | Mod: CPTII,S$GLB,, | Performed by: PHYSICIAN ASSISTANT

## 2022-06-01 PROCEDURE — 1101F PT FALLS ASSESS-DOCD LE1/YR: CPT | Mod: CPTII,S$GLB,, | Performed by: PHYSICIAN ASSISTANT

## 2022-06-01 PROCEDURE — 3078F DIAST BP <80 MM HG: CPT | Mod: CPTII,S$GLB,, | Performed by: PHYSICIAN ASSISTANT

## 2022-06-01 PROCEDURE — 99999 PR PBB SHADOW E&M-EST. PATIENT-LVL V: ICD-10-PCS | Mod: PBBFAC,,, | Performed by: PHYSICIAN ASSISTANT

## 2022-06-01 PROCEDURE — 3288F PR FALLS RISK ASSESSMENT DOCUMENTED: ICD-10-PCS | Mod: CPTII,S$GLB,, | Performed by: PHYSICIAN ASSISTANT

## 2022-06-01 PROCEDURE — 51798 PR MEAS,POST-VOID RES,US,NON-IMAGING: ICD-10-PCS | Mod: S$GLB,,, | Performed by: PHYSICIAN ASSISTANT

## 2022-06-01 PROCEDURE — 3288F FALL RISK ASSESSMENT DOCD: CPT | Mod: CPTII,S$GLB,, | Performed by: PHYSICIAN ASSISTANT

## 2022-06-01 PROCEDURE — 87186 SC STD MICRODIL/AGAR DIL: CPT | Performed by: PHYSICIAN ASSISTANT

## 2022-06-01 PROCEDURE — 3074F SYST BP LT 130 MM HG: CPT | Mod: CPTII,S$GLB,, | Performed by: PHYSICIAN ASSISTANT

## 2022-06-01 PROCEDURE — 99999 PR PBB SHADOW E&M-EST. PATIENT-LVL V: CPT | Mod: PBBFAC,,, | Performed by: PHYSICIAN ASSISTANT

## 2022-06-01 PROCEDURE — 51798 US URINE CAPACITY MEASURE: CPT | Mod: S$GLB,,, | Performed by: PHYSICIAN ASSISTANT

## 2022-06-01 PROCEDURE — 87077 CULTURE AEROBIC IDENTIFY: CPT | Performed by: PHYSICIAN ASSISTANT

## 2022-06-01 NOTE — PROGRESS NOTES
[facility] - UROGYNECOLOGY  [address] [address]  2022     Myesha Quinones  [id number]  1940    UROGYN FOLLOW-UP  2022     82 y.o. female,   presents for urogyn follow up. She c/o   Chief Complaint   Patient presents with    Pessary Check     fitting    .     Last HPI from 2022:  Patient pelvic organ prolapse referred for conservative therapy with pessary patient with indwelling De La Cruz catheter.  Soft that the pessary will assist in patient in decompressing bladder.     Changes from last visit:  Here for pessary fitting to help improve incomplete bladder emptying due to prolapse    Past Medical History:   Diagnosis Date    Acute hypoxemic respiratory failure 2019    Acute right-sided thoracic back pain 2019    Allergy     Asthma     Basal cell carcinoma     left forehead    Basal cell carcinoma     left nose    Basal cell carcinoma 2015    right nose    Basal cell carcinoma 2015    left lower post neck    Basal cell carcinoma 2019    left ant scalp     Bilateral renal cysts     Breast cancer     CAD (coronary artery disease)     Cardiomyopathy     Cardiomyopathy, ischemic     Cataract     CHF (congestive heart failure)     CKD (chronic kidney disease) stage 4, GFR 15-29 ml/min     Colon polyp     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, with long-term current use of insulin 2018    COPD (chronic obstructive pulmonary disease)     COPD exacerbation 2018    Current mild episode of major depressive disorder without prior episode 2022    Defibrillator discharge     Diabetes mellitus     Diabetes mellitus type II     Diabetes with neurologic complications     Edema     Goiter     MNG    Hematuria, unspecified     HX: breast cancer     Hyperlipidemia     Hypertension     Hypocalcemia     Hypokalemia      Hyponatremia     Hyponatremia 4/2/2022    Iron deficiency anemia 05/16/2017    Iron deficiency anemia     Left kidney mass     Meningioma     Microalbuminuria due to type 2 diabetes mellitus 01/26/2022    Osteoporosis, postmenopausal     Pneumonia 12/08/2019    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    Proteinuria 01/21/2019    Pseudomonas pneumonia     Skin cancer     s/p excision    Sleep apnea     CPAP    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/2015    mid forehead    Unspecified vitamin D deficiency     UTI (urinary tract infection) 04/02/2022    Ventricular tachycardia     Vitamin B12 deficiency     Vitamin D deficiency disease        Past Surgical History:   Procedure Laterality Date    BASAL CELL CARCINOMA EXCISION      posterior neck and nose    BREAST BIOPSY      BREAST CYST EXCISION Left     BREAST SURGERY      CARDIAC DEFIBRILLATOR PLACEMENT      x 2    CATARACT EXTRACTION W/  INTRAOCULAR LENS IMPLANT Bilateral     CHOLECYSTECTOMY      COLONOSCOPY N/A 11/5/2019    Procedure: COLONOSCOPY;  Surgeon: oBaz Botello MD;  Location: [facility] ENDO (4TH FLR);  Service: Endoscopy;  Laterality: N/A;  AICD - Medtronic - sm    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    FRACTURE SURGERY      left elbow and wrist as a child    HYSTERECTOMY      MASTECTOMY Right     REPLACEMENT OF IMPLANTABLE CARDIOVERTER-DEFIBRILLATOR (ICD) GENERATOR N/A 12/17/2018    Procedure: REPLACEMENT, ICD GENERATOR;  Surgeon: Jan Mckeon MD;  Location: [facility] EP LAB;  Service: Cardiology;  Laterality: N/A;  DONNA, CRT-D gen sulema, MDT, MAC, SK, 3 Prep    REVISION OF SKIN POCKET FOR CARDIOVERTER-DEFIBRILLATOR  12/17/2018    Procedure: Revision, Skin Pocket, For Cardioverter-Defibrillator;  Surgeon: Jan Mckeon MD;  Location: [facility] EP LAB;  Service: Cardiology;;    SQUAMOUS CELL CARCINOMA EXCISION      remved from forehead    TONSILLECTOMY         Family History   Problem Relation Age of Onset    Diabetes Father      Heart disease Father     Diabetes Sister     Heart disease Sister     Diabetes Brother     Heart disease Brother     Hypertension Brother     Diabetes Brother     Heart disease Brother     Hypertension Brother     Diabetes Brother     Heart disease Brother     Cancer Brother         colon    Diabetes Brother     Cancer Son         skin    Diabetes Son         prediabetes    Diabetes Daughter         prediabetes    Cancer Daughter         melanoma    Obesity Daughter     Melanoma Daughter     Diabetes Son     Asthma Mother     Hypertension Mother     Stroke Mother     No Known Problems Maternal Grandmother     No Known Problems Maternal Grandfather     No Known Problems Paternal Grandmother     No Known Problems Paternal Grandfather     Amblyopia Neg Hx     Blindness Neg Hx     Cataracts Neg Hx     Glaucoma Neg Hx     Macular degeneration Neg Hx     Retinal detachment Neg Hx     Strabismus Neg Hx     Thyroid disease Neg Hx        Social History     Socioeconomic History    Marital status:    Occupational History    Occupation: Homemaker     Employer: OTHER   Tobacco Use    Smoking status: Never Smoker    Smokeless tobacco: Never Used   Substance and Sexual Activity    Alcohol use: No     Alcohol/week: 0.0 standard drinks    Drug use: No    Sexual activity: Yes     Partners: Male   Other Topics Concern    Are you pregnant or think you may be? No    Breast-feeding No       Current Outpatient Medications   Medication Sig Dispense Refill    albuterol (PROVENTIL/VENTOLIN HFA) 90 mcg/actuation inhaler INHALE 2 PUFFS INTO THE LUNGS EVERY 6 (SIX) HOURS AS NEEDED FOR WHEEZING. RESCUE 18 g 12    albuterol-ipratropium (DUO-NEB) 2.5 mg-0.5 mg/3 mL nebulizer solution TAKE 3 MLS BY NEBULIZATION EVERY 4 (FOUR) HOURS AS NEEDED FOR WHEEZING. 270 mL 12    benzonatate (TESSALON) 100 MG capsule Take 1 capsule (100 mg total) by mouth 3 (three) times daily as needed for Cough. 20  "capsule 0    blood sugar diagnostic (ACCU-CHEK HECTOR) Strp Uses Accu-Check Hector meter to test BG 4x/day 400 strip 6    carvediloL (COREG) 25 MG tablet TAKE 2 TABLETS (50 MG TOTAL) BY MOUTH 2 (TWO) TIMES DAILY. 360 tablet 3    cholecalciferol, vitamin D3, (VITAMIN D3) 50 mcg (2,000 unit) Tab Take 1 tablet by mouth once daily.       dulaglutide (TRULICITY) 0.75 mg/0.5 mL pen injector Inject 0.75 mg into the skin every 7 days. 4 pen 11    estradioL (ESTRACE) 0.01 % (0.1 mg/gram) vaginal cream Place 1 g vaginally every Mon, Wed, Fri. 153 g 6    fluticasone propionate (FLONASE) 50 mcg/actuation nasal spray 2 sprays (100 mcg total) by Each Nostril route once daily. (Patient taking differently: 2 sprays by Each Nostril route daily as needed.) 16 g 12    fluticasone-salmeterol diskus inhaler 250-50 mcg Inhale 1 puff into the lungs as needed. Controller      furosemide (LASIX) 40 MG tablet Take 1 tablet (40 mg total) by mouth every 8 (eight) hours as needed (swelling). 90 tablet 3    hydrALAZINE (APRESOLINE) 100 MG tablet Take 1 tablet (100 mg total) by mouth 2 (two) times daily. 180 tablet 3    insulin (LANTUS SOLOSTAR U-100 INSULIN) glargine 100 units/mL (3mL) SubQ pen Inject 22 Units into the skin.      lancets (ACCU-CHEK SOFTCLIX LANCETS) Misc Uses Accu-Chek Hector meter to test BG 1x/day 400 each 3    levocetirizine (XYZAL) 5 MG tablet Take 5 mg by mouth as needed.      magnesium oxide (MAG-OX) 400 mg tablet Take 1 tablet (400 mg total) by mouth once daily.  0    montelukast (SINGULAIR) 10 mg tablet Take 1 tablet (10 mg total) by mouth every evening. (Patient taking differently: Take 10 mg by mouth daily as needed.) 90 tablet 3    nitroGLYCERIN (NITROSTAT) 0.4 MG SL tablet Place 0.4 mg under the tongue every 5 (five) minutes as needed for Chest pain.       omega-3 fatty acids 500 mg Cap Take 1 capsule by mouth Twice daily.      pen needle, diabetic (BD ULTRA-FINE MINI PEN NEEDLE) 31 gauge x 3/16" Ndle " "USE WITH LANTUS AT BEDTIME 400 each 3    polyethylene glycol (GLYCOLAX) 17 gram PwPk Take 17 g by mouth daily as needed (constipation). 10 each 1    pravastatin (PRAVACHOL) 40 MG tablet TAKE 1 TABLET BY MOUTH EVERY DAY 90 tablet 1    pulse oximeter (PULSE OXIMETER) device by Apply Externally route 2 (two) times a day. Use twice daily at 8 AM and 3 PM and record the value in [employer] as directed. 1 each 0    sodium bicarbonate 650 MG tablet Take 2 tablets (1,300 mg total) by mouth 3 (three) times daily. 180 tablet 11    sodium chloride 3% 3 % nebulizer solution TAKE 4 MLS BY NEBULIZATION 4 (FOUR) TIMES DAILY AS NEEDED FOR OTHER OR COUGH (TO INDUCE SPUTUM AND CLEAR MUCOUS.). 750 mL 11    tiotropium (SPIRIVA) 18 mcg inhalation capsule Inhale 1 capsule (18 mcg total) into the lungs once daily. Controller 90 capsule 3    valsartan (DIOVAN) 80 MG tablet Take 1 tablet (80 mg total) by mouth 2 (two) times daily. 180 tablet 3     No current facility-administered medications for this visit.       Review of patient's allergies indicates:   Allergen Reactions    Iodine and iodide containing products Hives    Nifedipine      weakness       OB History        3    Para   3    Term   3            AB        Living           SAB        IAB        Ectopic        Multiple        Live Births                          ROS  As per HPI.      Physical Exam  BP (!) 124/58   Ht 5' 3" (1.6 m)   Wt 62.1 kg (136 lb 12.8 oz)   LMP  (LMP Unknown)   BMI 24.23 kg/m²   General: alert and oriented, no acute distress  Respiratory: normal respiratory effort  Abd: soft, non-tender, non-distended  Pelvic:  Ext. Genitalia: normal external genitalia. Normal bartholin's and skeens glands  Vagina: ++ atrophy. Normal vaginal mucosa without lesions. No discharge noted.   Non-tender bladder base without palpable mass.  Cervix: absent  Uterus:  surgically absent, vaginal cuff well healed   Urethra: no masses or tenderness  Urethral " meatus: no lesions, caruncle or prolapse.    Pessary fitting today: took out lauren catheter. First fit with  #4 ring with support, voided 280/300 mls during active void trial with pessary in place, however pessary fell out after she voided. PVR WNL. #5 ring with support fit well. Discussed with Dr. Larose and decided to leave lauren catheter out. Patient did not want to learn independent insertion and removal.     Impression  1. Urinary retention  Urine culture     We reviewed the above issues and discussed options for short-term versus long-term management of her problems.     Plan:   Incomplete bladder emptying due to prolapse  -- pessary fitting today #5 ring with support and incontinence knob. Ordered through [employer]. Patient leaving in pessary, ordering replacement.   -- active void trial, urinated 280/300 cc with pessary in place    Vaginal atrophy (dryness):  --  Use 1 gram of estrogen cream inside vagina two to three nights a week. Apply to opening of vagina, as well , as inside vagina and at inner lips outside vagina.     RTC in 2-3 months for pessary check and repeat PVR    Eric Nelson PA-C  Division of Female Pelvic Medicine and Reconstructive Surgery  Department of Obstetrics & Gynecology  [facility] [place], [place]

## 2022-06-01 NOTE — PATIENT INSTRUCTIONS
Incomplete bladder emptying due to prolapse  -- pessary fitting today #5 ring with support and incontinence knob. Ordered through [employer]. Patient leaving in pessary, ordering replacement.   -- active void trial, urinated 300 cc with pessary in place    Vaginal atrophy (dryness):  Use 1 gram of estrogen cream inside vagina two to three nights a week. Apply to opening of vagina, as well , as inside vagina and at inner lips outside vagina.     RTC in 3 months for pessary check and repeat PVR

## 2022-06-02 DIAGNOSIS — N30.00 ACUTE CYSTITIS WITHOUT HEMATURIA: Primary | ICD-10-CM

## 2022-06-02 RX ORDER — CEPHALEXIN 500 MG/1
500 CAPSULE ORAL EVERY 12 HOURS
Qty: 10 CAPSULE | Refills: 0 | Status: SHIPPED | OUTPATIENT
Start: 2022-06-02 | End: 2022-06-07

## 2022-06-05 LAB — BACTERIA UR CULT: ABNORMAL

## 2022-06-06 ENCOUNTER — OFFICE VISIT (OUTPATIENT)
Dept: ENDOCRINOLOGY | Facility: CLINIC | Age: 82
End: 2022-06-06
Payer: MEDICARE

## 2022-06-06 VITALS
BODY MASS INDEX: 23.96 KG/M2 | HEIGHT: 63 IN | HEART RATE: 65 BPM | WEIGHT: 135.25 LBS | SYSTOLIC BLOOD PRESSURE: 150 MMHG | DIASTOLIC BLOOD PRESSURE: 73 MMHG

## 2022-06-06 DIAGNOSIS — E11.3213 CONTROLLED TYPE 2 DIABETES MELLITUS WITH BOTH EYES AFFECTED BY MILD NONPROLIFERATIVE RETINOPATHY AND MACULAR EDEMA, WITH LONG-TERM CURRENT USE OF INSULIN: Primary | ICD-10-CM

## 2022-06-06 DIAGNOSIS — E04.2 NONTOXIC MULTINODULAR GOITER: ICD-10-CM

## 2022-06-06 DIAGNOSIS — E13.3299 MILD NONPROLIFERATIVE RETINOPATHY DUE TO SECONDARY DIABETES: ICD-10-CM

## 2022-06-06 DIAGNOSIS — M81.0 OSTEOPOROSIS, UNSPECIFIED OSTEOPOROSIS TYPE, UNSPECIFIED PATHOLOGICAL FRACTURE PRESENCE: ICD-10-CM

## 2022-06-06 DIAGNOSIS — E27.8 ADRENAL CORTICAL NODULE: ICD-10-CM

## 2022-06-06 DIAGNOSIS — E11.42 DIABETIC POLYNEUROPATHY ASSOCIATED WITH TYPE 2 DIABETES MELLITUS: ICD-10-CM

## 2022-06-06 DIAGNOSIS — Z79.4 CONTROLLED TYPE 2 DIABETES MELLITUS WITH BOTH EYES AFFECTED BY MILD NONPROLIFERATIVE RETINOPATHY AND MACULAR EDEMA, WITH LONG-TERM CURRENT USE OF INSULIN: Primary | ICD-10-CM

## 2022-06-06 PROCEDURE — 3078F PR MOST RECENT DIASTOLIC BLOOD PRESSURE < 80 MM HG: ICD-10-PCS | Mod: CPTII,S$GLB,, | Performed by: NURSE PRACTITIONER

## 2022-06-06 PROCEDURE — 99999 PR PBB SHADOW E&M-EST. PATIENT-LVL IV: CPT | Mod: PBBFAC,,, | Performed by: NURSE PRACTITIONER

## 2022-06-06 PROCEDURE — 3077F PR MOST RECENT SYSTOLIC BLOOD PRESSURE >= 140 MM HG: ICD-10-PCS | Mod: CPTII,S$GLB,, | Performed by: NURSE PRACTITIONER

## 2022-06-06 PROCEDURE — 1126F PR PAIN SEVERITY QUANTIFIED, NO PAIN PRESENT: ICD-10-PCS | Mod: CPTII,S$GLB,, | Performed by: NURSE PRACTITIONER

## 2022-06-06 PROCEDURE — 1160F PR REVIEW ALL MEDS BY PRESCRIBER/CLIN PHARMACIST DOCUMENTED: ICD-10-PCS | Mod: CPTII,S$GLB,, | Performed by: NURSE PRACTITIONER

## 2022-06-06 PROCEDURE — 3077F SYST BP >= 140 MM HG: CPT | Mod: CPTII,S$GLB,, | Performed by: NURSE PRACTITIONER

## 2022-06-06 PROCEDURE — 3078F DIAST BP <80 MM HG: CPT | Mod: CPTII,S$GLB,, | Performed by: NURSE PRACTITIONER

## 2022-06-06 PROCEDURE — 1101F PT FALLS ASSESS-DOCD LE1/YR: CPT | Mod: CPTII,S$GLB,, | Performed by: NURSE PRACTITIONER

## 2022-06-06 PROCEDURE — 1159F MED LIST DOCD IN RCRD: CPT | Mod: CPTII,S$GLB,, | Performed by: NURSE PRACTITIONER

## 2022-06-06 PROCEDURE — 99214 OFFICE O/P EST MOD 30 MIN: CPT | Mod: S$GLB,,, | Performed by: NURSE PRACTITIONER

## 2022-06-06 PROCEDURE — 3288F PR FALLS RISK ASSESSMENT DOCUMENTED: ICD-10-PCS | Mod: CPTII,S$GLB,, | Performed by: NURSE PRACTITIONER

## 2022-06-06 PROCEDURE — 1126F AMNT PAIN NOTED NONE PRSNT: CPT | Mod: CPTII,S$GLB,, | Performed by: NURSE PRACTITIONER

## 2022-06-06 PROCEDURE — 99999 PR PBB SHADOW E&M-EST. PATIENT-LVL IV: ICD-10-PCS | Mod: PBBFAC,,, | Performed by: NURSE PRACTITIONER

## 2022-06-06 PROCEDURE — 1159F PR MEDICATION LIST DOCUMENTED IN MEDICAL RECORD: ICD-10-PCS | Mod: CPTII,S$GLB,, | Performed by: NURSE PRACTITIONER

## 2022-06-06 PROCEDURE — 1160F RVW MEDS BY RX/DR IN RCRD: CPT | Mod: CPTII,S$GLB,, | Performed by: NURSE PRACTITIONER

## 2022-06-06 PROCEDURE — 1101F PR PT FALLS ASSESS DOC 0-1 FALLS W/OUT INJ PAST YR: ICD-10-PCS | Mod: CPTII,S$GLB,, | Performed by: NURSE PRACTITIONER

## 2022-06-06 PROCEDURE — 3288F FALL RISK ASSESSMENT DOCD: CPT | Mod: CPTII,S$GLB,, | Performed by: NURSE PRACTITIONER

## 2022-06-06 PROCEDURE — 99214 PR OFFICE/OUTPT VISIT, EST, LEVL IV, 30-39 MIN: ICD-10-PCS | Mod: S$GLB,,, | Performed by: NURSE PRACTITIONER

## 2022-06-06 RX ORDER — FUROSEMIDE 80 MG/1
TABLET ORAL
Status: ON HOLD | COMMUNITY
Start: 2022-04-18 | End: 2022-06-28

## 2022-06-06 RX ORDER — INSULIN GLARGINE 100 [IU]/ML
22 INJECTION, SOLUTION SUBCUTANEOUS DAILY
Qty: 30 ML | Refills: 3 | Status: SHIPPED | OUTPATIENT
Start: 2022-06-06 | End: 2022-06-07 | Stop reason: SDUPTHER

## 2022-06-06 RX ORDER — LINAGLIPTIN 5 MG/1
5 TABLET, FILM COATED ORAL DAILY
Qty: 90 TABLET | Refills: 3 | Status: SHIPPED | OUTPATIENT
Start: 2022-06-06 | End: 2022-11-01

## 2022-06-06 RX ORDER — LANCETS
EACH MISCELLANEOUS
Qty: 400 EACH | Refills: 6 | Status: SHIPPED | OUTPATIENT
Start: 2022-06-06 | End: 2022-10-22 | Stop reason: SDUPTHER

## 2022-06-06 NOTE — ASSESSMENT & PLAN NOTE
-- Labs prior to follow up. Include fructosamine.   -- A1c goal < or =7.5%.  -- Medications discussed:  MFM - stopped due to CKD  GLP1-DPP4   SARAH   SGLT2   Reviewed potential adverse effects of SGLT-2 inhibitors, including genital mycotic infections, slightly increased risk of UTI, hypersensitivity, hypotension, and hyperkalemia. Advised to maintain water intake of 8-10 cups per day. Advised we need to check chemistry panel at baseline and 2 weeks after starting. Discussed FDA warning reports of ketoacidosis associated with SGLT-2 inhibitors. Advised to seek immediate medical attention and stop the medication if symptoms such as difficulty breathing, nausea, vomiting, abdominal pain, confusion, and unusual fatigue/sleepiness. Discussed possible precipitating factors including major illness/reduced food and fluid intake (advised to stop under these circumstances), and reduced insulin dose. Discussed possible effects of increased fracture risk/decreased bone density. Discussed reports of increased risk of leg and foot amputations with canagliflozin and need to seek urgent care if developed new pain or tenderness, sores or ulcers, or infections in legs/feet.   Insulin   -- Reviewed logs/CGM:  Some evening hyperglycemia.   Instructed to send glucose logs in 14 days.    Reach out to me sooner for any glucose <70 or consistently >200.  -- Medication Changes:   CHANGE  Lantus 22units DAILY  Tradjenta 5mg daily  -- Reviewed goals of therapy are to get the best control we can without hypoglycemia.  -- Reviewed patient's current insulin regimen. Clarified proper insulin dose and timing in relation to meals, etc. Insulin injection sites and proper rotation instructed.    -- Advised frequent self blood glucose monitoring.  Patient encouraged to document glucose results and bring them to every clinic visit.  -- Hypoglycemia precautions discussed. Instructed on precautions before driving.    -- Call for Bg repeatedly < 90 or >  180.   -- Close adherence to lifestyle changes recommended.   -- Periodic follow ups for eye evaluations, foot care and dental care suggested.

## 2022-06-07 ENCOUNTER — PATIENT MESSAGE (OUTPATIENT)
Dept: ENDOCRINOLOGY | Facility: CLINIC | Age: 82
End: 2022-06-07
Payer: MEDICARE

## 2022-06-07 DIAGNOSIS — E11.3213 CONTROLLED TYPE 2 DIABETES MELLITUS WITH BOTH EYES AFFECTED BY MILD NONPROLIFERATIVE RETINOPATHY AND MACULAR EDEMA, WITH LONG-TERM CURRENT USE OF INSULIN: ICD-10-CM

## 2022-06-07 DIAGNOSIS — Z79.4 CONTROLLED TYPE 2 DIABETES MELLITUS WITH BOTH EYES AFFECTED BY MILD NONPROLIFERATIVE RETINOPATHY AND MACULAR EDEMA, WITH LONG-TERM CURRENT USE OF INSULIN: ICD-10-CM

## 2022-06-07 RX ORDER — INSULIN GLARGINE 100 [IU]/ML
22 INJECTION, SOLUTION SUBCUTANEOUS DAILY
Qty: 30 ML | Refills: 3 | Status: SHIPPED | OUTPATIENT
Start: 2022-06-07 | End: 2022-07-25

## 2022-06-09 ENCOUNTER — OFFICE VISIT (OUTPATIENT)
Dept: DERMATOLOGY | Facility: CLINIC | Age: 82
End: 2022-06-09
Payer: MEDICARE

## 2022-06-09 DIAGNOSIS — Z12.83 ENCOUNTER FOR SCREENING FOR MALIGNANT NEOPLASM OF SKIN: ICD-10-CM

## 2022-06-09 DIAGNOSIS — L57.0 AK (ACTINIC KERATOSIS): Primary | ICD-10-CM

## 2022-06-09 DIAGNOSIS — D48.5 NEOPLASM OF UNCERTAIN BEHAVIOR OF SKIN: ICD-10-CM

## 2022-06-09 DIAGNOSIS — L82.1 SEBORRHEIC KERATOSES: ICD-10-CM

## 2022-06-09 PROCEDURE — 1160F RVW MEDS BY RX/DR IN RCRD: CPT | Mod: CPTII,S$GLB,, | Performed by: STUDENT IN AN ORGANIZED HEALTH CARE EDUCATION/TRAINING PROGRAM

## 2022-06-09 PROCEDURE — 1126F PR PAIN SEVERITY QUANTIFIED, NO PAIN PRESENT: ICD-10-PCS | Mod: CPTII,S$GLB,, | Performed by: STUDENT IN AN ORGANIZED HEALTH CARE EDUCATION/TRAINING PROGRAM

## 2022-06-09 PROCEDURE — 17003 DESTRUCT PREMALG LES 2-14: CPT | Mod: S$GLB,,, | Performed by: STUDENT IN AN ORGANIZED HEALTH CARE EDUCATION/TRAINING PROGRAM

## 2022-06-09 PROCEDURE — 1101F PT FALLS ASSESS-DOCD LE1/YR: CPT | Mod: CPTII,S$GLB,, | Performed by: STUDENT IN AN ORGANIZED HEALTH CARE EDUCATION/TRAINING PROGRAM

## 2022-06-09 PROCEDURE — 1126F AMNT PAIN NOTED NONE PRSNT: CPT | Mod: CPTII,S$GLB,, | Performed by: STUDENT IN AN ORGANIZED HEALTH CARE EDUCATION/TRAINING PROGRAM

## 2022-06-09 PROCEDURE — 3288F PR FALLS RISK ASSESSMENT DOCUMENTED: ICD-10-PCS | Mod: CPTII,S$GLB,, | Performed by: STUDENT IN AN ORGANIZED HEALTH CARE EDUCATION/TRAINING PROGRAM

## 2022-06-09 PROCEDURE — 11102 PR TANGENTIAL BIOPSY, SKIN, SINGLE LESION: ICD-10-PCS | Mod: S$GLB,,, | Performed by: STUDENT IN AN ORGANIZED HEALTH CARE EDUCATION/TRAINING PROGRAM

## 2022-06-09 PROCEDURE — 11102 TANGNTL BX SKIN SINGLE LES: CPT | Mod: S$GLB,,, | Performed by: STUDENT IN AN ORGANIZED HEALTH CARE EDUCATION/TRAINING PROGRAM

## 2022-06-09 PROCEDURE — 17003 DESTRUCTION, PREMALIGNANT LESIONS; SECOND THROUGH 14 LESIONS: ICD-10-PCS | Mod: S$GLB,,, | Performed by: STUDENT IN AN ORGANIZED HEALTH CARE EDUCATION/TRAINING PROGRAM

## 2022-06-09 PROCEDURE — 1101F PR PT FALLS ASSESS DOC 0-1 FALLS W/OUT INJ PAST YR: ICD-10-PCS | Mod: CPTII,S$GLB,, | Performed by: STUDENT IN AN ORGANIZED HEALTH CARE EDUCATION/TRAINING PROGRAM

## 2022-06-09 PROCEDURE — 17000 PR DESTRUCTION(LASER SURGERY,CRYOSURGERY,CHEMOSURGERY),PREMALIGNANT LESIONS,FIRST LESION: ICD-10-PCS | Mod: 59,S$GLB,, | Performed by: STUDENT IN AN ORGANIZED HEALTH CARE EDUCATION/TRAINING PROGRAM

## 2022-06-09 PROCEDURE — 17000 DESTRUCT PREMALG LESION: CPT | Mod: 59,S$GLB,, | Performed by: STUDENT IN AN ORGANIZED HEALTH CARE EDUCATION/TRAINING PROGRAM

## 2022-06-09 PROCEDURE — 99999 PR PBB SHADOW E&M-EST. PATIENT-LVL III: CPT | Mod: PBBFAC,,, | Performed by: STUDENT IN AN ORGANIZED HEALTH CARE EDUCATION/TRAINING PROGRAM

## 2022-06-09 PROCEDURE — 99213 OFFICE O/P EST LOW 20 MIN: CPT | Mod: 25,S$GLB,, | Performed by: STUDENT IN AN ORGANIZED HEALTH CARE EDUCATION/TRAINING PROGRAM

## 2022-06-09 PROCEDURE — 1159F PR MEDICATION LIST DOCUMENTED IN MEDICAL RECORD: ICD-10-PCS | Mod: CPTII,S$GLB,, | Performed by: STUDENT IN AN ORGANIZED HEALTH CARE EDUCATION/TRAINING PROGRAM

## 2022-06-09 PROCEDURE — 3288F FALL RISK ASSESSMENT DOCD: CPT | Mod: CPTII,S$GLB,, | Performed by: STUDENT IN AN ORGANIZED HEALTH CARE EDUCATION/TRAINING PROGRAM

## 2022-06-09 PROCEDURE — 88305 TISSUE EXAM BY PATHOLOGIST: ICD-10-PCS | Mod: 26,,, | Performed by: DERMATOLOGY

## 2022-06-09 PROCEDURE — 1159F MED LIST DOCD IN RCRD: CPT | Mod: CPTII,S$GLB,, | Performed by: STUDENT IN AN ORGANIZED HEALTH CARE EDUCATION/TRAINING PROGRAM

## 2022-06-09 PROCEDURE — 99213 PR OFFICE/OUTPT VISIT, EST, LEVL III, 20-29 MIN: ICD-10-PCS | Mod: 25,S$GLB,, | Performed by: STUDENT IN AN ORGANIZED HEALTH CARE EDUCATION/TRAINING PROGRAM

## 2022-06-09 PROCEDURE — 88305 TISSUE EXAM BY PATHOLOGIST: CPT | Mod: 59 | Performed by: DERMATOLOGY

## 2022-06-09 PROCEDURE — 1160F PR REVIEW ALL MEDS BY PRESCRIBER/CLIN PHARMACIST DOCUMENTED: ICD-10-PCS | Mod: CPTII,S$GLB,, | Performed by: STUDENT IN AN ORGANIZED HEALTH CARE EDUCATION/TRAINING PROGRAM

## 2022-06-09 PROCEDURE — 11103 PR TANGENTIAL BIOPSY, SKIN, EA ADDTL LESION: ICD-10-PCS | Mod: S$GLB,,, | Performed by: STUDENT IN AN ORGANIZED HEALTH CARE EDUCATION/TRAINING PROGRAM

## 2022-06-09 PROCEDURE — 11103 TANGNTL BX SKIN EA SEP/ADDL: CPT | Mod: S$GLB,,, | Performed by: STUDENT IN AN ORGANIZED HEALTH CARE EDUCATION/TRAINING PROGRAM

## 2022-06-09 PROCEDURE — 88305 TISSUE EXAM BY PATHOLOGIST: CPT | Mod: 26,,, | Performed by: DERMATOLOGY

## 2022-06-09 PROCEDURE — 99999 PR PBB SHADOW E&M-EST. PATIENT-LVL III: ICD-10-PCS | Mod: PBBFAC,,, | Performed by: STUDENT IN AN ORGANIZED HEALTH CARE EDUCATION/TRAINING PROGRAM

## 2022-06-09 NOTE — PATIENT INSTRUCTIONS
CRYOSURGERY      Your doctor has used a method called cryosurgery to treat your skin condition. Cryosurgery refers to the use of very cold substances to treat a variety of skin conditions such as warts, pre-skin cancers, molluscum contagiosum, sun spots, and several benign growths. The substance we use in cryosurgery is liquid nitrogen and is so cold (-195 degrees Celsius) that is burns when administered.     Following treatment in the office, the skin may immediately burn and become red. You may find the area around the lesion is affected as well. It is sometimes necessary to treat not only the lesion, but a small area of the surrounding normal skin to achieve a good response.     A blister, and even a blood filled blister, may form after treatment.   This is a normal response. If the blister is painful, it is acceptable to sterilize a needle and with rubbing alcohol and gently pop the blister. It is important that you gently wash the area with soap and warm water as the blister fluid may contain wart virus if a wart was treated. Do no remove the roof of the blister.     The area treated can take anywhere from 1-3 weeks to heal. Healing time depends on the kind skin lesion treated, the location, and how aggressively the lesion was treated. It is recommended that the areas treated are covered with Vaseline or bacitracin ointment and a band-aid. If a band-aid is not practical, just ointment applied several times per day will do. Keeping these areas moist will speed the healing time.    Treatment with liquid nitrogen can leave a scar. In dark skin, it may be a light or dark scar, in light skin it may be a white or pink scar. These will generally fade with time, but may never go away completely.     If you have any concerns after your treatment, please feel free to call the office.       [address] [place] [address]/ [phone] FAX/ [email or web address]  Shave Biopsy Wound Care    Your  doctor has performed a shave biopsy today.  A band aid and vaseline ointment has been placed over the site.  This should remain in place for NO LONGER THAN 48 hours.  It is fine to remove the bandaid after 24 hours, if the area is no longer bleeding. It is recommended that you keep the area dry (do not wet)) for the first 24 hours.  After 24 hours, wash the area with warm soap and water and apply Vaseline jelly.  Many patients prefer to use Neosporin or Bacitracin ointment.  This is acceptable; however, know that you can develop an allergy to this medication even if you have used it safely for years.  It is important to keep the area moist.  Letting it dry out and get air slows healing time, and will worsen the scar.        If you notice increasing redness, tenderness, pain, or yellow drainage at the biopsy site, please notify your doctor.  These are signs of an infection.    If your biopsy site is bleeding, apply firm pressure for 15 minutes straight.  Repeat for another 15 minutes, if it is still bleeding.   If the surgical site continues to bleed, then please contact your doctor.      For [facility] users:   You will receive your biopsy results in [facility] as soon as they are available. Please be assured that your physician/provider will review your results and will then determine what further treatment, evaluation, or planning is required. You should be contacted by your physician's/provider's office within 5 business days of receiving your results; If not, please reach out to directly. This is one more way [facility] is putting you first.     [facility] [place] [address]/ [phone] FAX/ [email or web address]

## 2022-06-09 NOTE — PROGRESS NOTES
Subjective:       Patient ID:  Myesha Quinones is a 82 y.o. female who presents for   Chief Complaint   Patient presents with    Skin Check     UBSE     History of Present Illness: The patient presents for follow up of skin check.    The patient was last seen on: 03/09/2021 by  for cryosurgery to actinic keratoses which have resolved.           Review of Systems   Skin: Negative for daily sunscreen use (makeup with sunscreen), activity-related sunscreen use, recent sunburn and wears hat.   Hematologic/Lymphatic: Bruises/bleeds easily.        Objective:    Physical Exam   Constitutional: She appears well-developed and well-nourished. No distress.   Neurological: She is alert and oriented to person, place, and time. She is not disoriented.   Psychiatric: She has a normal mood and affect.   Skin:   Areas Examined (abnormalities noted in diagram):   Scalp / Hair Palpated and Inspected  Head / Face Inspection Performed  Neck Inspection Performed  Chest / Axilla Inspection Performed  Back Inspection Performed  RUE Inspected  LUE Inspection Performed  Nails and Digits Inspection Performed                       Diagram Legend     Erythematous scaling macule/papule c/w actinic keratosis       Vascular papule c/w angioma      Pigmented verrucoid papule/plaque c/w seborrheic keratosis      Yellow umbilicated papule c/w sebaceous hyperplasia      Irregularly shaped tan macule c/w lentigo     1-2 mm smooth white papules consistent with Milia      Movable subcutaneous cyst with punctum c/w epidermal inclusion cyst      Subcutaneous movable cyst c/w pilar cyst      Firm pink to brown papule c/w dermatofibroma      Pedunculated fleshy papule(s) c/w skin tag(s)      Evenly pigmented macule c/w junctional nevus     Mildly variegated pigmented, slightly irregular-bordered macule c/w mildly atypical nevus      Flesh colored to evenly pigmented papule c/w intradermal nevus       Pink pearly papule/plaque c/w basal  cell carcinoma      Erythematous hyperkeratotic cursted plaque c/w SCC      Surgical scar with no sign of skin cancer recurrence      Open and closed comedones      Inflammatory papules and pustules      Verrucoid papule consistent consistent with wart     Erythematous eczematous patches and plaques     Dystrophic onycholytic nail with subungual debris c/w onychomycosis     Umbilicated papule    Erythematous-base heme-crusted tan verrucoid plaque consistent with inflamed seborrheic keratosis     Erythematous Silvery Scaling Plaque c/w Psoriasis     See annotation              Assessment / Plan:      Pathology Orders:     Normal Orders This Visit    Specimen to Pathology, Dermatology     Questions:    Procedure Type: Dermatology and skin neoplasms    Number of Specimens: 2    ------------------------: -------------------------    Spec 1 Procedure: Biopsy    Spec 1 Clinical Impression: r/o SCC    Spec 1 Source: right posterior neck    ------------------------: -------------------------    Spec 2 Procedure: Biopsy    Spec 2 Clinical Impression: r/o bcc    Spec 2 Source: right alar groove    Release to patient: Immediate        AK (actinic keratosis)  Cryosurgery Procedure Note    Verbal consent from the patient is obtained including, but not limited to, risk of hypopigmentation/hyperpigmentation, scar, recurrence of lesion. The patient is aware of the precancerous quality and need for treatment of these lesions. Liquid nitrogen cryosurgery is applied to the 5 actinic keratoses, as detailed in the physical exam, to produce a freeze injury. The patient is aware that blisters may form and is instructed on wound care with gentle cleansing and use of vaseline ointment to keep moist until healed. The patient is supplied a handout on cryosurgery and is instructed to call if lesions do not completely resolve.    Encounter for screening for malignant neoplasm of skin  Area(s) of previous NMSC evaluated with no signs of  recurrence.    Upper body skin examination performed today including at least 6 points as noted in physical examination.    Recommend daily sun protection/avoidance and use of at least SPF 30, broad spectrum sunscreen (OTC drug).     Neoplasm of uncertain behavior of skin  Shave biopsy procedure note:    Shave biopsy performed after verbal consent including risk of infection, scar, recurrence, need for additional treatment of site. Area prepped with alcohol, anesthetized with approximately 1.0cc of 1% lidocaine with epinephrine. Lesional tissue shaved with razor blade. Hemostasis achieved with application of aluminum chloride followed by hyfrecation. No complications. Dressing applied. Wound care explained.      -     Specimen to Pathology, Dermatology    Seborrheic keratoses  These are benign inherited growths without a malignant potential. Reassurance given to patient. No treatment is necessary.              Follow up in about 3 months (around 9/9/2022).

## 2022-06-13 ENCOUNTER — OFFICE VISIT (OUTPATIENT)
Dept: PULMONOLOGY | Facility: CLINIC | Age: 82
End: 2022-06-13
Payer: MEDICARE

## 2022-06-13 VITALS
OXYGEN SATURATION: 91 % | HEART RATE: 70 BPM | DIASTOLIC BLOOD PRESSURE: 54 MMHG | WEIGHT: 136.69 LBS | HEIGHT: 63 IN | BODY MASS INDEX: 24.22 KG/M2 | SYSTOLIC BLOOD PRESSURE: 128 MMHG

## 2022-06-13 DIAGNOSIS — R05.9 COUGH: Primary | ICD-10-CM

## 2022-06-13 DIAGNOSIS — E87.1 HYPONATREMIA: ICD-10-CM

## 2022-06-13 DIAGNOSIS — A31.0 MYCOBACTERIUM AVIUM COMPLEX: ICD-10-CM

## 2022-06-13 DIAGNOSIS — J30.2 SEASONAL ALLERGIES: ICD-10-CM

## 2022-06-13 DIAGNOSIS — B44.81 ABPA (ALLERGIC BRONCHOPULMONARY ASPERGILLOSIS): ICD-10-CM

## 2022-06-13 DIAGNOSIS — J44.1 CHRONIC OBSTRUCTIVE PULMONARY DISEASE WITH ACUTE EXACERBATION: ICD-10-CM

## 2022-06-13 DIAGNOSIS — I50.42 CHRONIC COMBINED SYSTOLIC AND DIASTOLIC HEART FAILURE: ICD-10-CM

## 2022-06-13 DIAGNOSIS — N18.4 CKD (CHRONIC KIDNEY DISEASE) STAGE 4, GFR 15-29 ML/MIN: ICD-10-CM

## 2022-06-13 DIAGNOSIS — J45.50 SEVERE PERSISTENT CHRONIC ASTHMA WITHOUT COMPLICATION: ICD-10-CM

## 2022-06-13 DIAGNOSIS — R91.8 MULTIPLE LUNG NODULES: ICD-10-CM

## 2022-06-13 DIAGNOSIS — E11.9 DIET-CONTROLLED DIABETES MELLITUS: ICD-10-CM

## 2022-06-13 PROCEDURE — 99214 PR OFFICE/OUTPT VISIT, EST, LEVL IV, 30-39 MIN: ICD-10-PCS | Mod: S$GLB,,, | Performed by: INTERNAL MEDICINE

## 2022-06-13 PROCEDURE — 99999 PR PBB SHADOW E&M-EST. PATIENT-LVL III: ICD-10-PCS | Mod: PBBFAC,,, | Performed by: INTERNAL MEDICINE

## 2022-06-13 PROCEDURE — 1101F PT FALLS ASSESS-DOCD LE1/YR: CPT | Mod: CPTII,S$GLB,, | Performed by: INTERNAL MEDICINE

## 2022-06-13 PROCEDURE — 1126F AMNT PAIN NOTED NONE PRSNT: CPT | Mod: CPTII,S$GLB,, | Performed by: INTERNAL MEDICINE

## 2022-06-13 PROCEDURE — 99999 PR PBB SHADOW E&M-EST. PATIENT-LVL III: CPT | Mod: PBBFAC,,, | Performed by: INTERNAL MEDICINE

## 2022-06-13 PROCEDURE — 3288F PR FALLS RISK ASSESSMENT DOCUMENTED: ICD-10-PCS | Mod: CPTII,S$GLB,, | Performed by: INTERNAL MEDICINE

## 2022-06-13 PROCEDURE — 3078F PR MOST RECENT DIASTOLIC BLOOD PRESSURE < 80 MM HG: ICD-10-PCS | Mod: CPTII,S$GLB,, | Performed by: INTERNAL MEDICINE

## 2022-06-13 PROCEDURE — 1159F PR MEDICATION LIST DOCUMENTED IN MEDICAL RECORD: ICD-10-PCS | Mod: CPTII,S$GLB,, | Performed by: INTERNAL MEDICINE

## 2022-06-13 PROCEDURE — 3288F FALL RISK ASSESSMENT DOCD: CPT | Mod: CPTII,S$GLB,, | Performed by: INTERNAL MEDICINE

## 2022-06-13 PROCEDURE — 1101F PR PT FALLS ASSESS DOC 0-1 FALLS W/OUT INJ PAST YR: ICD-10-PCS | Mod: CPTII,S$GLB,, | Performed by: INTERNAL MEDICINE

## 2022-06-13 PROCEDURE — 3074F SYST BP LT 130 MM HG: CPT | Mod: CPTII,S$GLB,, | Performed by: INTERNAL MEDICINE

## 2022-06-13 PROCEDURE — 1159F MED LIST DOCD IN RCRD: CPT | Mod: CPTII,S$GLB,, | Performed by: INTERNAL MEDICINE

## 2022-06-13 PROCEDURE — 3078F DIAST BP <80 MM HG: CPT | Mod: CPTII,S$GLB,, | Performed by: INTERNAL MEDICINE

## 2022-06-13 PROCEDURE — 99214 OFFICE O/P EST MOD 30 MIN: CPT | Mod: S$GLB,,, | Performed by: INTERNAL MEDICINE

## 2022-06-13 PROCEDURE — 1126F PR PAIN SEVERITY QUANTIFIED, NO PAIN PRESENT: ICD-10-PCS | Mod: CPTII,S$GLB,, | Performed by: INTERNAL MEDICINE

## 2022-06-13 PROCEDURE — 3074F PR MOST RECENT SYSTOLIC BLOOD PRESSURE < 130 MM HG: ICD-10-PCS | Mod: CPTII,S$GLB,, | Performed by: INTERNAL MEDICINE

## 2022-06-13 RX ORDER — PREDNISONE 10 MG/1
10 TABLET ORAL DAILY
Qty: 10 TABLET | Refills: 0 | Status: ON HOLD | OUTPATIENT
Start: 2022-06-13 | End: 2022-07-01 | Stop reason: HOSPADM

## 2022-06-13 RX ORDER — AMOXICILLIN AND CLAVULANATE POTASSIUM 875; 125 MG/1; MG/1
1 TABLET, FILM COATED ORAL EVERY 12 HOURS
Qty: 20 TABLET | Refills: 0 | Status: ON HOLD | OUTPATIENT
Start: 2022-06-13 | End: 2022-06-28

## 2022-06-13 NOTE — PROGRESS NOTES
"Subjective:       Patient ID: Myesha Quinones is a 82 y.o. female.    Chief Complaint: No chief complaint on file.    HPI   Myesha Quinones has a past medical history of CAD, combined systolic and diastolic heart failure, LBBB, biventricular ICD, CKD4, IDDM, breast cancer, HTN, HLD, severe persistent asthma, HLD, who is here by virtual visit for continued shortness of breath, asthmatic symptoms, congestion and abnormal CT thorax.  Currently taking Spiriva, Wixela (250-50), Albuterol, Duonebs, Singulair, Flonase.      Today she is having some cough and congestion that she is attributing to sinus infection.  She was unable to tolerate her voriconazole due to side effects.  She started 10mg PO prednisone two days ago with meds she had at home.  She is not having fevers.  Not able to start biologic due to MAC infection.  Not on therapy for MAC and this was discussed via secure chat with her ID provider.     Review of Systems   Constitutional: Negative for fever, chills, activity change, appetite change and night sweats.   HENT: Positive for postnasal drip, rhinorrhea, sinus pressure and congestion.    Eyes: Negative.    Respiratory: Positive for cough, sputum production, shortness of breath, wheezing, asthma nighttime symptoms and dyspnea on extertion. Negative for use of rescue inhaler.    Cardiovascular: Negative for chest pain, palpitations and leg swelling.   Genitourinary: Negative.    Endocrine: endocrine negative   Musculoskeletal: Negative.    Skin: Negative.    Gastrointestinal: Negative for nausea, vomiting, abdominal pain, abdominal distention and acid reflux.   Neurological: Negative.    Psychiatric/Behavioral: Negative.        Objective:       Vitals:    06/13/22 0938   BP: (!) 128/54   BP Location: Right arm   Patient Position: Sitting   Pulse: 70   SpO2: (!) 91%   Weight: 62 kg (136 lb 11 oz)   Height: 5' 3" (1.6 m)         Physical Exam   Constitutional: She is oriented to person, place, and " time. She appears well-developed and well-nourished. She appears not cachectic. No distress.   HENT:   Head: Normocephalic.   Neck: No JVD present.   Cardiovascular: Normal rate, regular rhythm and normal heart sounds. Exam reveals no gallop and no friction rub.   No murmur heard.  Pulmonary/Chest: Normal expansion and symmetric chest wall expansion. No respiratory distress. She has no decreased breath sounds. She has wheezes. She has rhonchi. She has rales. Positive for egophony. Negative for tactile fremitus.   Abdominal: Soft. Bowel sounds are normal. She exhibits no distension.   Musculoskeletal:         General: Edema present. Normal range of motion.      Cervical back: Normal range of motion.   Neurological: She is alert and oriented to person, place, and time.   Skin: Skin is warm and dry. She is not diaphoretic.   Psychiatric: She has a normal mood and affect. Her behavior is normal. Judgment and thought content normal.        Personal Diagnostic Review  CT thorax 2/26/2022:  Obliteration of the right bronchus intermedius and bilateral lower lobes basal segments bronchi possibly by mucous, aspiration or pneumonia.  There is complete volume loss of the middle lobe and patchy subsegmental atelectasis or airspace disease of the basal segments of the bilateral lower lobes right more than left.  Consider aspiration or pneumonia.  Enlarged precarinal and sub carinal nodes could be reactive to the underlying inflammatory process, less likely a neoplastic process not excluded.  Right upper lobe pleural base nodule, For a solid nodule >8 mm, Fleischner Society 2017 guidelines recommend considering follow-up CT at 3 months.  Significant coronary artery calcifications.  Bilateral small pleural effusion left more than right.  Bilateral benign adrenal adenoma.  Right thyroid lobe nodule slightly larger compared to the prior chest CT.  Ultrasound could be done if clinically warranted.    PFTs 10/2020:  FEV1/FVC -  50%  FEV1 - 0.85L (50%)  FVC - 1.71L (76%)  TLC - 2.95L (66%)  DLCO - 56%  Bronchodilator - no significant response    6MWT 10/2020:  No significant desaturation.  Walked 200feet with initial sat 97% and drop to 93% with exertion.  Recovered to 97% on room air.     No flowsheet data found.      Assessment:       1. Cough    2. Hyponatremia    3. Seasonal allergies    4. Severe persistent chronic asthma without complication    5. Multiple lung nodules    6. Chronic obstructive pulmonary disease with acute exacerbation    7. Chronic combined systolic and diastolic heart failure    8. CKD (chronic kidney disease) stage 4, GFR 15-29 ml/min    9. ABPA (allergic bronchopulmonary aspergillosis)    10. Mycobacterium avium complex    11. Diet-controlled diabetes mellitus        Outpatient Encounter Medications as of 6/13/2022   Medication Sig Dispense Refill    albuterol (PROVENTIL/VENTOLIN HFA) 90 mcg/actuation inhaler INHALE 2 PUFFS INTO THE LUNGS EVERY 6 (SIX) HOURS AS NEEDED FOR WHEEZING. RESCUE 18 g 12    albuterol-ipratropium (DUO-NEB) 2.5 mg-0.5 mg/3 mL nebulizer solution TAKE 3 MLS BY NEBULIZATION EVERY 4 (FOUR) HOURS AS NEEDED FOR WHEEZING. 270 mL 12    benzonatate (TESSALON) 100 MG capsule Take 1 capsule (100 mg total) by mouth 3 (three) times daily as needed for Cough. 20 capsule 0    blood sugar diagnostic (ACCU-CHEK HECTOR) Strp Uses Accu-Check Hector meter to test BG 4x/day 400 strip 6    carvediloL (COREG) 25 MG tablet TAKE 2 TABLETS (50 MG TOTAL) BY MOUTH 2 (TWO) TIMES DAILY. 360 tablet 3    cholecalciferol, vitamin D3, (VITAMIN D3) 50 mcg (2,000 unit) Tab Take 1 tablet by mouth once daily.       estradioL (ESTRACE) 0.01 % (0.1 mg/gram) vaginal cream Place 1 g vaginally every Mon, Wed, Fri. 153 g 6    fluticasone propionate (FLONASE) 50 mcg/actuation nasal spray 2 sprays (100 mcg total) by Each Nostril route once daily. (Patient taking differently: 2 sprays by Each Nostril route daily as needed.) 16 g  "12    fluticasone-salmeterol diskus inhaler 250-50 mcg Inhale 1 puff into the lungs as needed. Controller      furosemide (LASIX) 40 MG tablet Take 1 tablet (40 mg total) by mouth every 8 (eight) hours as needed (swelling). 90 tablet 3    furosemide (LASIX) 80 MG tablet TAKE 1 TABLET BY MOUTH EVERY 8 HOURS AS NEEDED FOR SWELLING      hydrALAZINE (APRESOLINE) 100 MG tablet Take 1 tablet (100 mg total) by mouth 2 (two) times daily. 180 tablet 3    insulin (LANTUS SOLOSTAR U-100 INSULIN) glargine 100 units/mL (3mL) SubQ pen Inject 22 Units into the skin once daily. 30 mL 3    lancets (ACCU-CHEK SOFTCLIX LANCETS) Misc Uses Accu-Chek Angelica meter to test BG 1x/day 400 each 6    levocetirizine (XYZAL) 5 MG tablet Take 5 mg by mouth as needed.      linaGLIPtin (TRADJENTA) 5 mg Tab tablet Take 1 tablet (5 mg total) by mouth once daily. 90 tablet 3    magnesium oxide (MAG-OX) 400 mg tablet Take 1 tablet (400 mg total) by mouth once daily.  0    montelukast (SINGULAIR) 10 mg tablet Take 1 tablet (10 mg total) by mouth every evening. (Patient taking differently: Take 10 mg by mouth daily as needed.) 90 tablet 3    nitroGLYCERIN (NITROSTAT) 0.4 MG SL tablet Place 0.4 mg under the tongue every 5 (five) minutes as needed for Chest pain.       omega-3 fatty acids 500 mg Cap Take 1 capsule by mouth Twice daily.      pen needle, diabetic (BD ULTRA-FINE MINI PEN NEEDLE) 31 gauge x 3/16" Ndle USE WITH LANTUS AT BEDTIME 400 each 3    polyethylene glycol (GLYCOLAX) 17 gram PwPk Take 17 g by mouth daily as needed (constipation). 10 each 1    pravastatin (PRAVACHOL) 40 MG tablet TAKE 1 TABLET BY MOUTH EVERY DAY 90 tablet 1    pulse oximeter (PULSE OXIMETER) device by Apply Externally route 2 (two) times a day. Use twice daily at 8 AM and 3 PM and [facility]. 1 each 0    sodium bicarbonate 650 MG tablet Take 2 tablets (1,300 mg total) by mouth 3 (three) times daily. 180 tablet 11    sodium " "chloride 3% 3 % nebulizer solution TAKE 4 MLS BY NEBULIZATION 4 (FOUR) TIMES DAILY AS NEEDED FOR OTHER OR COUGH (TO INDUCE SPUTUM AND CLEAR MUCOUS.). 750 mL 11    tiotropium (SPIRIVA) 18 mcg inhalation capsule Inhale 1 capsule (18 mcg total) into the lungs once daily. Controller 90 capsule 3    valsartan (DIOVAN) 80 MG tablet Take 1 tablet (80 mg total) by mouth 2 (two) times daily. 180 tablet 3    amoxicillin-clavulanate 875-125mg (AUGMENTIN) 875-125 mg per tablet Take 1 tablet by mouth every 12 (twelve) hours. for 10 days 20 tablet 0    predniSONE (DELTASONE) 10 MG tablet Take 1 tablet (10 mg total) by mouth once daily. for 10 days 10 tablet 0     No facility-administered encounter medications on file as of 6/13/2022.     Orders Placed This Encounter   Procedures    Culture, Respiratory with Gram Stain     Standing Status:   Future     Standing Expiration Date:   8/12/2023    AFB Culture & Smear     Standing Status:   Future     Standing Expiration Date:   8/12/2023    CULTURE, FUNGUS     Standing Status:   Future     Standing Expiration Date:   8/12/2023    CT Chest Without Contrast     Standing Status:   Future     Standing Expiration Date:   6/13/2023     Order Specific Question:   May the Radiologist modify the order per protocol to meet the clinical needs of the patient?     Answer:   Yes       Plan:       1) ABPA  2) COPD/asthma  3) MAC infection  4) HFrEF  5) CKD4  6) RUL pulmonary nodule  7) seasonal allergies    - continue Spiriva, Wixela (250-50), Albuterol, Duonebs, Singulair, Flonase  - saw infectious disease and started on voriconazole, but did not tolerate due to "making her sick".  Her symptoms are currently improved following recent antibiotic therapy.  - follows with ID primarily for ABPA and voriconazole therapy, but I also brought up current MAC infection.  She is not a candidate for therapy at this point and time.  - standard of care for ABPA is steroid therapy.  This is complicated by " patient's MAC infection, diabetes and heart failure.  Will limit to PRN use at this time.  - continue optimization of BP and heart failure as possible.  - repeat CT thorax ordered for f/u pulmonary nodule and prior parenchymal lung disease.  - repeat sputum cultures (fungal, AFB, bacterial)  - 10 day course of augmentin with 10mg PO prednisone to accompany.  Patient instructed to contact me if this is insufficient.  I chose augmentin because her symptoms were primarily of her sinuses.  She has grown pseudomonas in the past in sputum (susceptable to levaquin) but her last QTc was high.   - counseled on nasal rinse and flonase use.    RTC 4 months or sooner PRN    Kelvin Maki MD  [facility]

## 2022-06-14 ENCOUNTER — LAB VISIT (OUTPATIENT)
Dept: LAB | Facility: HOSPITAL | Age: 82
End: 2022-06-14
Attending: INTERNAL MEDICINE
Payer: MEDICARE

## 2022-06-14 DIAGNOSIS — R05.9 COUGH: ICD-10-CM

## 2022-06-14 LAB
FINAL PATHOLOGIC DIAGNOSIS: NORMAL
GROSS: NORMAL
Lab: NORMAL
MICROSCOPIC EXAM: NORMAL

## 2022-06-14 PROCEDURE — 87206 SMEAR FLUORESCENT/ACID STAI: CPT | Performed by: INTERNAL MEDICINE

## 2022-06-14 PROCEDURE — 87205 SMEAR GRAM STAIN: CPT | Performed by: INTERNAL MEDICINE

## 2022-06-14 PROCEDURE — 87077 CULTURE AEROBIC IDENTIFY: CPT | Performed by: INTERNAL MEDICINE

## 2022-06-14 PROCEDURE — 87102 FUNGUS ISOLATION CULTURE: CPT | Performed by: INTERNAL MEDICINE

## 2022-06-14 PROCEDURE — 87116 MYCOBACTERIA CULTURE: CPT | Performed by: INTERNAL MEDICINE

## 2022-06-14 PROCEDURE — 87186 SC STD MICRODIL/AGAR DIL: CPT | Performed by: INTERNAL MEDICINE

## 2022-06-14 PROCEDURE — 87070 CULTURE OTHR SPECIMN AEROBIC: CPT | Performed by: INTERNAL MEDICINE

## 2022-06-17 ENCOUNTER — TELEPHONE (OUTPATIENT)
Dept: DERMATOLOGY | Facility: CLINIC | Age: 82
End: 2022-06-17
Payer: MEDICARE

## 2022-06-17 LAB
BACTERIA SPEC AEROBE CULT: ABNORMAL
GRAM STN SPEC: ABNORMAL

## 2022-06-17 NOTE — TELEPHONE ENCOUNTER
----- Message from Ruperto Kay sent at 6/17/2022 11:11 AM CDT -----  Regarding: resched her appt on 8/17      The Pt states that she will be out of town on 8/17/22 and would like a call back to resche the appt     # [phone]

## 2022-06-17 NOTE — TELEPHONE ENCOUNTER
Pt has been r/s for Mohs sx from 8/17/2022 to 8/24/2022 for BCC right alar groove. Pt stated that she will be on vacation on the original appt date. Pt verbally confirmed appt date and time.

## 2022-06-20 ENCOUNTER — HOSPITAL ENCOUNTER (INPATIENT)
Facility: HOSPITAL | Age: 82
LOS: 9 days | Discharge: HOME HEALTH | DRG: 177 | End: 2022-07-01
Attending: EMERGENCY MEDICINE | Admitting: HOSPITALIST
Payer: MEDICARE

## 2022-06-20 ENCOUNTER — TELEPHONE (OUTPATIENT)
Dept: ENDOCRINOLOGY | Facility: CLINIC | Age: 82
End: 2022-06-20
Payer: MEDICARE

## 2022-06-20 DIAGNOSIS — I50.9 CHF (CONGESTIVE HEART FAILURE): ICD-10-CM

## 2022-06-20 DIAGNOSIS — R06.02 SHORTNESS OF BREATH: ICD-10-CM

## 2022-06-20 DIAGNOSIS — J96.01 ACUTE RESPIRATORY FAILURE WITH HYPOXIA: ICD-10-CM

## 2022-06-20 DIAGNOSIS — B44.81 ABPA (ALLERGIC BRONCHOPULMONARY ASPERGILLOSIS): ICD-10-CM

## 2022-06-20 DIAGNOSIS — A31.0 MYCOBACTERIUM AVIUM COMPLEX: ICD-10-CM

## 2022-06-20 DIAGNOSIS — J44.1 COPD WITH ACUTE EXACERBATION: ICD-10-CM

## 2022-06-20 DIAGNOSIS — I50.9 ACUTE ON CHRONIC CONGESTIVE HEART FAILURE, UNSPECIFIED HEART FAILURE TYPE: ICD-10-CM

## 2022-06-20 DIAGNOSIS — U07.1 COVID-19 VIRUS INFECTION: Primary | ICD-10-CM

## 2022-06-20 PROBLEM — N17.9 ACUTE KIDNEY INJURY SUPERIMPOSED ON CHRONIC KIDNEY DISEASE: Status: ACTIVE | Noted: 2020-07-27

## 2022-06-20 PROBLEM — I50.43 ACUTE ON CHRONIC COMBINED SYSTOLIC AND DIASTOLIC CHF (CONGESTIVE HEART FAILURE): Status: ACTIVE | Noted: 2022-06-20

## 2022-06-20 PROBLEM — N18.9 ACUTE KIDNEY INJURY SUPERIMPOSED ON CHRONIC KIDNEY DISEASE: Status: ACTIVE | Noted: 2020-07-27

## 2022-06-20 PROBLEM — E11.9 TYPE 2 DIABETES MELLITUS, WITH LONG-TERM CURRENT USE OF INSULIN: Status: ACTIVE | Noted: 2022-06-20

## 2022-06-20 PROBLEM — Z79.4 TYPE 2 DIABETES MELLITUS, WITH LONG-TERM CURRENT USE OF INSULIN: Status: ACTIVE | Noted: 2022-06-20

## 2022-06-20 PROBLEM — I10 ESSENTIAL HYPERTENSION: Status: ACTIVE | Noted: 2022-06-20

## 2022-06-20 LAB
ALBUMIN SERPL BCP-MCNC: 2.8 G/DL (ref 3.5–5.2)
ALP SERPL-CCNC: 107 U/L (ref 55–135)
ALT SERPL W/O P-5'-P-CCNC: 12 U/L (ref 10–44)
ANION GAP SERPL CALC-SCNC: 10 MMOL/L (ref 8–16)
AST SERPL-CCNC: 15 U/L (ref 10–40)
BACTERIA #/AREA URNS HPF: NORMAL /HPF
BASOPHILS # BLD AUTO: 0.04 K/UL (ref 0–0.2)
BASOPHILS NFR BLD: 0.3 % (ref 0–1.9)
BILIRUB SERPL-MCNC: 0.4 MG/DL (ref 0.1–1)
BILIRUB UR QL STRIP: NEGATIVE
BNP SERPL-MCNC: 584 PG/ML (ref 0–99)
BUN SERPL-MCNC: 64 MG/DL (ref 8–23)
CALCIUM SERPL-MCNC: 9 MG/DL (ref 8.7–10.5)
CHLORIDE SERPL-SCNC: 95 MMOL/L (ref 95–110)
CK SERPL-CCNC: 22 U/L (ref 20–180)
CLARITY UR: ABNORMAL
CO2 SERPL-SCNC: 29 MMOL/L (ref 23–29)
COLOR UR: YELLOW
CREAT SERPL-MCNC: 2.8 MG/DL (ref 0.5–1.4)
CTP QC/QA: YES
D DIMER PPP IA.FEU-MCNC: 2.38 MG/L FEU
DIFFERENTIAL METHOD: ABNORMAL
EOSINOPHIL # BLD AUTO: 0.1 K/UL (ref 0–0.5)
EOSINOPHIL NFR BLD: 0.5 % (ref 0–8)
ERYTHROCYTE [DISTWIDTH] IN BLOOD BY AUTOMATED COUNT: 14.6 % (ref 11.5–14.5)
ERYTHROCYTE [SEDIMENTATION RATE] IN BLOOD BY WESTERGREN METHOD: 120 MM/HR (ref 0–20)
EST. GFR  (AFRICAN AMERICAN): 17 ML/MIN/1.73 M^2
EST. GFR  (NON AFRICAN AMERICAN): 15 ML/MIN/1.73 M^2
FERRITIN SERPL-MCNC: 111 NG/ML (ref 20–300)
GLUCOSE SERPL-MCNC: 161 MG/DL (ref 70–110)
GLUCOSE UR QL STRIP: ABNORMAL
HCT VFR BLD AUTO: 27.8 % (ref 37–48.5)
HGB BLD-MCNC: 9 G/DL (ref 12–16)
HGB UR QL STRIP: NEGATIVE
HYALINE CASTS #/AREA URNS LPF: 0 /LPF
IMM GRANULOCYTES # BLD AUTO: 0.19 K/UL (ref 0–0.04)
IMM GRANULOCYTES NFR BLD AUTO: 1.4 % (ref 0–0.5)
INFLUENZA A, MOLECULAR: NEGATIVE
INFLUENZA B, MOLECULAR: NEGATIVE
KETONES UR QL STRIP: NEGATIVE
LACTATE SERPL-SCNC: 1 MMOL/L (ref 0.5–2.2)
LDH SERPL L TO P-CCNC: 182 U/L (ref 110–260)
LEUKOCYTE ESTERASE UR QL STRIP: ABNORMAL
LYMPHOCYTES # BLD AUTO: 1.7 K/UL (ref 1–4.8)
LYMPHOCYTES NFR BLD: 12.7 % (ref 18–48)
MCH RBC QN AUTO: 28.5 PG (ref 27–31)
MCHC RBC AUTO-ENTMCNC: 32.4 G/DL (ref 32–36)
MCV RBC AUTO: 88 FL (ref 82–98)
MICROSCOPIC COMMENT: NORMAL
MONOCYTES # BLD AUTO: 0.9 K/UL (ref 0.3–1)
MONOCYTES NFR BLD: 6.4 % (ref 4–15)
NEUTROPHILS # BLD AUTO: 10.5 K/UL (ref 1.8–7.7)
NEUTROPHILS NFR BLD: 78.7 % (ref 38–73)
NITRITE UR QL STRIP: NEGATIVE
NRBC BLD-RTO: 0 /100 WBC
PH UR STRIP: 8 [PH] (ref 5–8)
PLATELET # BLD AUTO: 260 K/UL (ref 150–450)
PMV BLD AUTO: 9.8 FL (ref 9.2–12.9)
POTASSIUM SERPL-SCNC: 4.2 MMOL/L (ref 3.5–5.1)
PROCALCITONIN SERPL IA-MCNC: 0.06 NG/ML
PROT SERPL-MCNC: 7.3 G/DL (ref 6–8.4)
PROT UR QL STRIP: ABNORMAL
RBC # BLD AUTO: 3.16 M/UL (ref 4–5.4)
RBC #/AREA URNS HPF: 3 /HPF (ref 0–4)
SARS-COV-2 RDRP RESP QL NAA+PROBE: POSITIVE
SODIUM SERPL-SCNC: 134 MMOL/L (ref 136–145)
SP GR UR STRIP: 1.01 (ref 1–1.03)
SPECIMEN SOURCE: NORMAL
SQUAMOUS #/AREA URNS HPF: 21 /HPF
TROPONIN I SERPL DL<=0.01 NG/ML-MCNC: 0.01 NG/ML (ref 0–0.03)
TROPONIN I SERPL DL<=0.01 NG/ML-MCNC: 0.01 NG/ML (ref 0–0.03)
URN SPEC COLLECT METH UR: ABNORMAL
UROBILINOGEN UR STRIP-ACNC: NEGATIVE EU/DL
WBC # BLD AUTO: 13.35 K/UL (ref 3.9–12.7)
WBC #/AREA URNS HPF: 4 /HPF (ref 0–5)

## 2022-06-20 PROCEDURE — 84484 ASSAY OF TROPONIN QUANT: CPT

## 2022-06-20 PROCEDURE — 94761 N-INVAS EAR/PLS OXIMETRY MLT: CPT

## 2022-06-20 PROCEDURE — 93005 ELECTROCARDIOGRAM TRACING: CPT

## 2022-06-20 PROCEDURE — 25000003 PHARM REV CODE 250

## 2022-06-20 PROCEDURE — 93010 ELECTROCARDIOGRAM REPORT: CPT | Mod: ,,, | Performed by: INTERNAL MEDICINE

## 2022-06-20 PROCEDURE — 94640 AIRWAY INHALATION TREATMENT: CPT | Mod: XB

## 2022-06-20 PROCEDURE — 99291 CRITICAL CARE FIRST HOUR: CPT | Mod: 25

## 2022-06-20 PROCEDURE — 85652 RBC SED RATE AUTOMATED: CPT

## 2022-06-20 PROCEDURE — 25000242 PHARM REV CODE 250 ALT 637 W/ HCPCS

## 2022-06-20 PROCEDURE — 63600175 PHARM REV CODE 636 W HCPCS

## 2022-06-20 PROCEDURE — U0002 COVID-19 LAB TEST NON-CDC: HCPCS

## 2022-06-20 PROCEDURE — 87502 INFLUENZA DNA AMP PROBE: CPT

## 2022-06-20 PROCEDURE — 96372 THER/PROPH/DIAG INJ SC/IM: CPT

## 2022-06-20 PROCEDURE — 96375 TX/PRO/DX INJ NEW DRUG ADDON: CPT

## 2022-06-20 PROCEDURE — 93010 EKG 12-LEAD: ICD-10-PCS | Mod: ,,, | Performed by: INTERNAL MEDICINE

## 2022-06-20 PROCEDURE — 84484 ASSAY OF TROPONIN QUANT: CPT | Mod: 91

## 2022-06-20 PROCEDURE — 83880 ASSAY OF NATRIURETIC PEPTIDE: CPT

## 2022-06-20 PROCEDURE — 27100098 HC SPACER

## 2022-06-20 PROCEDURE — 51798 US URINE CAPACITY MEASURE: CPT

## 2022-06-20 PROCEDURE — 83605 ASSAY OF LACTIC ACID: CPT

## 2022-06-20 PROCEDURE — 80053 COMPREHEN METABOLIC PANEL: CPT

## 2022-06-20 PROCEDURE — G0378 HOSPITAL OBSERVATION PER HR: HCPCS

## 2022-06-20 PROCEDURE — 99900035 HC TECH TIME PER 15 MIN (STAT)

## 2022-06-20 PROCEDURE — 82728 ASSAY OF FERRITIN: CPT

## 2022-06-20 PROCEDURE — 27000221 HC OXYGEN, UP TO 24 HOURS

## 2022-06-20 PROCEDURE — 99292 CRITICAL CARE ADDL 30 MIN: CPT

## 2022-06-20 PROCEDURE — 83615 LACTATE (LD) (LDH) ENZYME: CPT

## 2022-06-20 PROCEDURE — 81000 URINALYSIS NONAUTO W/SCOPE: CPT

## 2022-06-20 PROCEDURE — 85379 FIBRIN DEGRADATION QUANT: CPT

## 2022-06-20 PROCEDURE — 94640 AIRWAY INHALATION TREATMENT: CPT

## 2022-06-20 PROCEDURE — 84145 PROCALCITONIN (PCT): CPT

## 2022-06-20 PROCEDURE — 96365 THER/PROPH/DIAG IV INF INIT: CPT

## 2022-06-20 PROCEDURE — 85025 COMPLETE CBC W/AUTO DIFF WBC: CPT

## 2022-06-20 PROCEDURE — 82550 ASSAY OF CK (CPK): CPT

## 2022-06-20 RX ORDER — ALBUTEROL SULFATE 90 UG/1
2 AEROSOL, METERED RESPIRATORY (INHALATION) EVERY 6 HOURS
Status: DISCONTINUED | OUTPATIENT
Start: 2022-06-20 | End: 2022-06-29

## 2022-06-20 RX ORDER — INSULIN ASPART 100 [IU]/ML
0-5 INJECTION, SOLUTION INTRAVENOUS; SUBCUTANEOUS
Status: DISCONTINUED | OUTPATIENT
Start: 2022-06-20 | End: 2022-06-21

## 2022-06-20 RX ORDER — FLUTICASONE PROPIONATE 50 MCG
2 SPRAY, SUSPENSION (ML) NASAL DAILY
Status: DISCONTINUED | OUTPATIENT
Start: 2022-06-21 | End: 2022-07-01 | Stop reason: HOSPADM

## 2022-06-20 RX ORDER — TIOTROPIUM BROMIDE 18 UG/1
18 CAPSULE ORAL; RESPIRATORY (INHALATION) DAILY
Refills: 3 | Status: DISCONTINUED | OUTPATIENT
Start: 2022-06-21 | End: 2022-07-01 | Stop reason: HOSPADM

## 2022-06-20 RX ORDER — FUROSEMIDE 10 MG/ML
40 INJECTION INTRAMUSCULAR; INTRAVENOUS
Status: COMPLETED | OUTPATIENT
Start: 2022-06-20 | End: 2022-06-20

## 2022-06-20 RX ORDER — FLUTICASONE FUROATE AND VILANTEROL 100; 25 UG/1; UG/1
1 POWDER RESPIRATORY (INHALATION) DAILY
Status: DISCONTINUED | OUTPATIENT
Start: 2022-06-21 | End: 2022-07-01 | Stop reason: HOSPADM

## 2022-06-20 RX ORDER — IPRATROPIUM BROMIDE AND ALBUTEROL SULFATE 2.5; .5 MG/3ML; MG/3ML
3 SOLUTION RESPIRATORY (INHALATION)
Status: COMPLETED | OUTPATIENT
Start: 2022-06-20 | End: 2022-06-20

## 2022-06-20 RX ORDER — FUROSEMIDE 10 MG/ML
40 INJECTION INTRAMUSCULAR; INTRAVENOUS
Status: DISCONTINUED | OUTPATIENT
Start: 2022-06-20 | End: 2022-06-22

## 2022-06-20 RX ORDER — HEPARIN SODIUM 5000 [USP'U]/ML
5000 INJECTION, SOLUTION INTRAVENOUS; SUBCUTANEOUS EVERY 8 HOURS
Status: DISCONTINUED | OUTPATIENT
Start: 2022-06-20 | End: 2022-06-20

## 2022-06-20 RX ORDER — CARVEDILOL 25 MG/1
25 TABLET ORAL 2 TIMES DAILY
Status: DISCONTINUED | OUTPATIENT
Start: 2022-06-20 | End: 2022-07-01 | Stop reason: HOSPADM

## 2022-06-20 RX ORDER — SODIUM BICARBONATE 650 MG/1
1300 TABLET ORAL 3 TIMES DAILY
Status: DISCONTINUED | OUTPATIENT
Start: 2022-06-20 | End: 2022-07-01 | Stop reason: HOSPADM

## 2022-06-20 RX ORDER — GLUCAGON 1 MG
1 KIT INJECTION
Status: DISCONTINUED | OUTPATIENT
Start: 2022-06-20 | End: 2022-07-01 | Stop reason: HOSPADM

## 2022-06-20 RX ORDER — ENOXAPARIN SODIUM 100 MG/ML
1 INJECTION SUBCUTANEOUS
Status: DISCONTINUED | OUTPATIENT
Start: 2022-06-20 | End: 2022-07-01 | Stop reason: HOSPADM

## 2022-06-20 RX ORDER — IBUPROFEN 200 MG
16 TABLET ORAL
Status: DISCONTINUED | OUTPATIENT
Start: 2022-06-20 | End: 2022-07-01 | Stop reason: HOSPADM

## 2022-06-20 RX ORDER — FUROSEMIDE 10 MG/ML
40 INJECTION INTRAMUSCULAR; INTRAVENOUS
Status: DISCONTINUED | OUTPATIENT
Start: 2022-06-20 | End: 2022-06-20

## 2022-06-20 RX ORDER — MONTELUKAST SODIUM 10 MG/1
10 TABLET ORAL NIGHTLY
Status: DISCONTINUED | OUTPATIENT
Start: 2022-06-20 | End: 2022-07-01 | Stop reason: HOSPADM

## 2022-06-20 RX ORDER — HYDRALAZINE HYDROCHLORIDE 20 MG/ML
10 INJECTION INTRAMUSCULAR; INTRAVENOUS EVERY 8 HOURS PRN
Status: DISCONTINUED | OUTPATIENT
Start: 2022-06-20 | End: 2022-07-01 | Stop reason: HOSPADM

## 2022-06-20 RX ORDER — BENZONATATE 100 MG/1
100 CAPSULE ORAL 3 TIMES DAILY PRN
Status: DISCONTINUED | OUTPATIENT
Start: 2022-06-20 | End: 2022-07-01 | Stop reason: HOSPADM

## 2022-06-20 RX ORDER — IBUPROFEN 200 MG
24 TABLET ORAL
Status: DISCONTINUED | OUTPATIENT
Start: 2022-06-20 | End: 2022-07-01 | Stop reason: HOSPADM

## 2022-06-20 RX ORDER — SODIUM CHLORIDE 0.9 % (FLUSH) 0.9 %
10 SYRINGE (ML) INJECTION
Status: DISCONTINUED | OUTPATIENT
Start: 2022-06-20 | End: 2022-07-01 | Stop reason: HOSPADM

## 2022-06-20 RX ORDER — HYDRALAZINE HYDROCHLORIDE 25 MG/1
100 TABLET, FILM COATED ORAL 2 TIMES DAILY
Status: DISCONTINUED | OUTPATIENT
Start: 2022-06-20 | End: 2022-07-01 | Stop reason: HOSPADM

## 2022-06-20 RX ORDER — PREDNISONE 20 MG/1
40 TABLET ORAL
Status: COMPLETED | OUTPATIENT
Start: 2022-06-20 | End: 2022-06-20

## 2022-06-20 RX ORDER — NITROGLYCERIN 0.4 MG/1
0.4 TABLET SUBLINGUAL EVERY 5 MIN PRN
Status: DISCONTINUED | OUTPATIENT
Start: 2022-06-20 | End: 2022-07-01 | Stop reason: HOSPADM

## 2022-06-20 RX ORDER — PRAVASTATIN SODIUM 40 MG/1
40 TABLET ORAL DAILY
Status: DISCONTINUED | OUTPATIENT
Start: 2022-06-21 | End: 2022-07-01 | Stop reason: HOSPADM

## 2022-06-20 RX ORDER — ASCORBIC ACID 500 MG
500 TABLET ORAL 2 TIMES DAILY
Status: DISCONTINUED | OUTPATIENT
Start: 2022-06-20 | End: 2022-07-01 | Stop reason: HOSPADM

## 2022-06-20 RX ADMIN — CARVEDILOL 25 MG: 25 TABLET, FILM COATED ORAL at 08:06

## 2022-06-20 RX ADMIN — MONTELUKAST 10 MG: 10 TABLET, FILM COATED ORAL at 08:06

## 2022-06-20 RX ADMIN — IPRATROPIUM BROMIDE AND ALBUTEROL SULFATE 3 ML: 2.5; .5 SOLUTION RESPIRATORY (INHALATION) at 02:06

## 2022-06-20 RX ADMIN — FUROSEMIDE 40 MG: 40 INJECTION, SOLUTION INTRAMUSCULAR; INTRAVENOUS at 02:06

## 2022-06-20 RX ADMIN — BENZONATATE 100 MG: 100 CAPSULE ORAL at 07:06

## 2022-06-20 RX ADMIN — ENOXAPARIN SODIUM 60 MG: 100 INJECTION SUBCUTANEOUS at 05:06

## 2022-06-20 RX ADMIN — IPRATROPIUM BROMIDE AND ALBUTEROL SULFATE 3 ML: 2.5; .5 SOLUTION RESPIRATORY (INHALATION) at 03:06

## 2022-06-20 RX ADMIN — ALBUTEROL SULFATE 2 PUFF: 90 AEROSOL, METERED RESPIRATORY (INHALATION) at 07:06

## 2022-06-20 RX ADMIN — HYDRALAZINE HYDROCHLORIDE 100 MG: 25 TABLET, FILM COATED ORAL at 08:06

## 2022-06-20 RX ADMIN — SODIUM BICARBONATE 1300 MG: 650 TABLET ORAL at 08:06

## 2022-06-20 RX ADMIN — OXYCODONE HYDROCHLORIDE AND ACETAMINOPHEN 500 MG: 500 TABLET ORAL at 08:06

## 2022-06-20 RX ADMIN — PREDNISONE 40 MG: 20 TABLET ORAL at 02:06

## 2022-06-20 RX ADMIN — REMDESIVIR 200 MG: 100 INJECTION, POWDER, LYOPHILIZED, FOR SOLUTION INTRAVENOUS at 07:06

## 2022-06-20 RX ADMIN — FUROSEMIDE 40 MG: 10 INJECTION, SOLUTION INTRAMUSCULAR; INTRAVENOUS at 08:06

## 2022-06-20 NOTE — TELEPHONE ENCOUNTER
----- Message from Elidia Madison NP sent at 6/6/2022  9:15 AM CDT -----  Glucose - okay to call at 0700

## 2022-06-20 NOTE — ED NOTES
Patient tested positive for covid. Moved to a closed room for isolation. Isolation precautions initiated.

## 2022-06-20 NOTE — HPI
"Myesha Quinones has a past medical history of CAD, combined systolic and diastolic heart failure, LBBB, biventricular ICD, CKD4, IDDM, breast cancer, HTN, HLD, severe persistent asthma, COPD, and HLD presents to [facility] ED with complaints of SOB. She states her SOB is associated with a productive cough. She states her SOB has been progressively worsened over the past two weeks. She reports she was seen by her pulmonologist and was prescribed antibiotic and prednisone which she started last Tuesday. She states she did not complete her antibiotics or steroid therapy. She denies fever, chest pain, palpitations, N/V, or diarrhea.     She was seen by her pulmonolgist, Dr. Maki on 6/13/22 for shortness of breath. Reported cough and congestion attributing to sinus infection. She was also noted to have a current MAC infection which she is not on therapy. She was started on 10 day course of Augmentin with 10mg PO prednisone to accompany.     Of Note: She was primarily followed by ID, Dr. Amaya, for ABPA (allergic bronchopulmonary aspergillosis) and was started on Voriconazole therapy in which she did not tolerate.       In the ED: BP (!) 165/107   Pulse 83   Temp 98 °F (36.7 °C) (Axillary)   Resp (!) 22   Ht 5' 3" (1.6 m)   Wt 59 kg (130 lb)   LMP  (LMP Unknown)   SpO2 (!) 94%   BMI 23.03 kg/m² Labs revealed WBC 13.35, H/H 9/27.8, BUN 64, creatinine 2.8, Glucose 161, . Troponin 0.015. 12 lead EKG revealed Normal sinus rhythm, Left axis deviation, Right bundle branch block. She is Covid positive. CXR revealed diffuse pulmonary edema with a right small pleural effusion. She received Duo-neb tx x1, Lasix 40mg IV x1, and Prednisone 40mg PO x1. ID consulted to follow.     "

## 2022-06-20 NOTE — ASSESSMENT & PLAN NOTE
Estimated Creatinine Clearance: 12.6 mL/min (A) (based on SCr of 2.8 mg/dL (H)).  Baseline Cr: 2.1Baseline BUN:46   Recent Labs   Lab 06/20/22  1425   CO2 29   BUN 64*   CREATININE 2.8*       -Etiology likely 2/2 in setting of ADHF  -Will hold on fluid administration and continue diuresis with IV Lasix  -Monitor strict urine output  - Continue to monitor renal function with daily labs and trend electrolytes  - renally dose medications  - avoid nephrotoxic agents including NSAIDs, aminoglycosides, IV contrast (unless absolutely necessary), gadolinium, fleets and other phosphorous-based laxatives  - monitor events that may lead to decreased renal perfusion (hypovolemia, hypotension, sepsis)  - monitor urine output to assure that no obstruction precipitates worsening in GFR

## 2022-06-20 NOTE — ED TRIAGE NOTES
Patient arrived to ED via EMS with complaints of SOB. Patient has been on antibiotics for what she believes to be pneumonia. Patient did not take her lasix this morning. Does not wear oxygen at home. Per EMS, patient ambulated to the door to let them in and she was SOB, wheezing, appeared to be in distress. O2 sat on RA was 90%. Breathing tx initiated. Patient [place]. Awake, alert, oriented x 4. Denies CP, NVD, dizziness, vision changes. B ankles swollen. CMS intact. No neurological deficits.

## 2022-06-20 NOTE — ASSESSMENT & PLAN NOTE
Clinically-stable.  Afebrile. Elevation of WBC, Productive Cough.    --Received Duo-neb x1 and 40mg PO Prednisone x1 in ED  --Will continue home regimen of maintenance inhalers  -resume home montelukast and Flonase   --Albuterol INH q6 scheduled in setting of COVID-19 infection  --PRN Oxygen per Nasal Cannula for SpO2 <90%  --Pulse-Ox Monitoring every 4 hours  --Monitor respiratory status closely

## 2022-06-20 NOTE — ASSESSMENT & PLAN NOTE
Hemoglobin A1C   Date Value Ref Range Status   02/22/2022 5.9 (H) 4.0 - 5.6 % Final     -Serum glucose on admit 161  - hold home oral medications  - LDSSI with ACCUcheck ACHS  - Diabetic diet  -Hypoglycemia protocol PRN  -Monitor closely and adjust insulin regimen as clinically indicated to achieve levels of 140-180 during hospitalization

## 2022-06-20 NOTE — ASSESSMENT & PLAN NOTE
-Resume home BB and hydralazine; hold ARB in setting of VIRGILIO  -Monitor and trend vital signs q4hr   -Anti-hypertensives PRN for SBP >180

## 2022-06-20 NOTE — ASSESSMENT & PLAN NOTE
Last LDL was   Lab Results   Component Value Date    LDLCALC 29.8 (L) 04/03/2022      Patient is chronically on statin.will continue for now.   Monitor clinically.

## 2022-06-20 NOTE — ED PROVIDER NOTES
Encounter Date: 6/20/2022       History     Chief Complaint   Patient presents with    Shortness of Breath     C/o worsening sob diagnosed with lung infection recently.     Myesha Quinones has a past medical history of CAD, combined systolic and diastolic heart failure, LBBB, biventricular ICD, CKD4, IDDM, breast cancer, HTN, HLD, severe persistent asthma, HLD, who presented to the ED via ems for shortness of breath. Patient reports worsening cough and shortness of breath the past week. She notes she was seen by her pulmonologist who started her on Augmentin and Prednisone. Patient reports mild improvement in symptoms since. She notes that she has been wheezing and feeling worse over the past two days, she has been using breathing treatments. She denies, chest, fever, nausea, rhinorrhea, abdominal pain, vomiting at this time. Patient also notes she did not take her antibiotics or lasix todays.         Review of patient's allergies indicates:   Allergen Reactions    Iodine and iodide containing products Hives    Nifedipine      weakness     Past Medical History:   Diagnosis Date    Acute hypoxemic respiratory failure 12/19/2019    Acute right-sided thoracic back pain 12/09/2019    Allergy     Asthma     Basal cell carcinoma     left forehead    Basal cell carcinoma     left nose    Basal cell carcinoma 05/20/2015    right nose    Basal cell carcinoma 12/22/2015    left lower post neck    Basal cell carcinoma 12/03/2019    left ant scalp     Bilateral renal cysts     Breast cancer     CAD (coronary artery disease)     Cardiomyopathy     Cardiomyopathy, ischemic     Cataract     CHF (congestive heart failure)     CKD (chronic kidney disease) stage 4, GFR 15-29 ml/min     Colon polyp 2011    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, with long-term current use of insulin 02/22/2018    COPD (chronic obstructive pulmonary  disease)     COPD exacerbation 04/08/2018    Current mild episode of major depressive disorder without prior episode 01/25/2022    Defibrillator discharge     Diabetes mellitus     Diabetes mellitus type II     Diabetes with neurologic complications     Edema     Goiter     MNG    Hematuria, unspecified     HX: breast cancer     Hyperlipidemia     Hypertension     Hypocalcemia     Hypokalemia     Hyponatremia     Hyponatremia 4/2/2022    Iron deficiency anemia 05/16/2017    Iron deficiency anemia     Left kidney mass     Meningioma     Microalbuminuria due to type 2 diabetes mellitus 01/26/2022    Osteoporosis, postmenopausal     Pneumonia 12/08/2019    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    Proteinuria 01/21/2019    Pseudomonas pneumonia     Skin cancer     s/p excision    Sleep apnea     CPAP    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/2015    mid forehead    Unspecified vitamin D deficiency     UTI (urinary tract infection) 04/02/2022    Ventricular tachycardia     Vitamin B12 deficiency     Vitamin D deficiency disease      Past Surgical History:   Procedure Laterality Date    BASAL CELL CARCINOMA EXCISION      posterior neck and nose    BREAST BIOPSY      BREAST CYST EXCISION Left     BREAST SURGERY      CARDIAC DEFIBRILLATOR PLACEMENT      x 2    CATARACT EXTRACTION W/  INTRAOCULAR LENS IMPLANT Bilateral     CHOLECYSTECTOMY      COLONOSCOPY N/A 11/5/2019    Procedure: COLONOSCOPY;  Surgeon: Boaz Botello MD;  Location: [facility] ENDO ([address] [place] [address]);  Service: Endoscopy;  Laterality: N/A;  [facility] [employer] -     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    FRACTURE SURGERY      left elbow and wrist as a child    HYSTERECTOMY      MASTECTOMY Right     REPLACEMENT OF IMPLANTABLE CARDIOVERTER-DEFIBRILLATOR (ICD) GENERATOR N/A 12/17/2018    Procedure: REPLACEMENT, ICD GENERATOR;  Surgeon: Jan Mckeon MD;  Location: [facility] EP LAB;  Service: Cardiology;  Laterality: N/A;   DONNA, CRT-D gen change, MDT, MAC, SK, 3 Prep    REVISION OF SKIN POCKET FOR CARDIOVERTER-DEFIBRILLATOR  12/17/2018    Procedure: Revision, Skin Pocket, For Cardioverter-Defibrillator;  Surgeon: Jan Mckeon MD;  Location: [facility];  Service: Cardiology;;    SQUAMOUS CELL CARCINOMA EXCISION      remved from forehead    TONSILLECTOMY       Family History   Problem Relation Age of Onset    Diabetes Father     Heart disease Father     Diabetes Sister     Heart disease Sister     Diabetes Brother     Heart disease Brother     Hypertension Brother     Diabetes Brother     Heart disease Brother     Hypertension Brother     Diabetes Brother     Heart disease Brother     Cancer Brother         colon    Diabetes Brother     Cancer Son         skin    Diabetes Son         prediabetes    Diabetes Daughter         prediabetes    Cancer Daughter         melanoma    Obesity Daughter     Melanoma Daughter     Diabetes Son     Asthma Mother     Hypertension Mother     Stroke Mother     No Known Problems Maternal Grandmother     No Known Problems Maternal Grandfather     No Known Problems Paternal Grandmother     No Known Problems Paternal Grandfather     Amblyopia Neg Hx     Blindness Neg Hx     Cataracts Neg Hx     Glaucoma Neg Hx     Macular degeneration Neg Hx     Retinal detachment Neg Hx     Strabismus Neg Hx     Thyroid disease Neg Hx      Social History     Tobacco Use    Smoking status: Never Smoker    Smokeless tobacco: Never Used   Substance Use Topics    Alcohol use: No     Alcohol/week: 0.0 standard drinks    Drug use: No     Review of Systems   Constitutional: Negative for fever.   HENT: Negative for rhinorrhea and sore throat.    Respiratory: Positive for cough, shortness of breath and wheezing.    Cardiovascular: Positive for leg swelling. Negative for chest pain and palpitations.   Gastrointestinal: Negative for nausea and vomiting.   Genitourinary: Negative for dysuria.    Musculoskeletal: Negative for back pain.   Skin: Negative for color change and rash.   Neurological: Negative for weakness.   Hematological: Does not bruise/bleed easily.       Physical Exam     Initial Vitals [06/20/22 1344]   BP Pulse Resp Temp SpO2   (!) 154/55 82 15 98 °F (36.7 °C) 96 %      MAP       --         Physical Exam    Nursing note and vitals reviewed.  Constitutional: She appears well-developed and well-nourished.   HENT:   Head: Normocephalic and atraumatic.   Eyes: EOM are normal. Pupils are equal, round, and reactive to light.   Neck: Neck supple. No JVD present.   Normal range of motion.  Cardiovascular: Normal rate, regular rhythm, normal heart sounds and intact distal pulses.   Pacemaker/AICD   Pulmonary/Chest: No respiratory distress. She has wheezes. She has rhonchi. She exhibits no tenderness.   Abdominal: Abdomen is soft. Bowel sounds are normal. She exhibits no distension.   Musculoskeletal:         General: Edema present. No tenderness. Normal range of motion.      Cervical back: Normal range of motion and neck supple.      Comments: Bilateral lower extremity edema      Lymphadenopathy:     She has no cervical adenopathy.   Neurological: She is alert and oriented to person, place, and time. She has normal strength and normal reflexes. GCS score is 15. GCS eye subscore is 4. GCS verbal subscore is 5. GCS motor subscore is 6.   Skin: Skin is warm and dry. Capillary refill takes less than 2 seconds.         ED Course   Critical Care    Date/Time: 6/20/2022 3:00 PM  Performed by: Arsenio Islas MD  Authorized by: Arsenio Islas MD   Direct patient critical care time: 15 minutes  Additional history critical care time: 15 minutes  Ordering / reviewing critical care time: 15 minutes  Documentation critical care time: 15 minutes  Consulting other physicians critical care time: 15 minutes  Consult with family critical care time: 10 minutes  Total critical care time (exclusive of  procedural time) : 85 minutes  Critical care time was exclusive of separately billable procedures and treating other patients.  Critical care was necessary to treat or prevent imminent or life-threatening deterioration of the following conditions: respiratory failure.  Critical care was time spent personally by me on the following activities: evaluation of patient's response to treatment, ordering and review of laboratory studies, pulse oximetry, review of old charts, obtaining history from patient or surrogate, development of treatment plan with patient or surrogate, interpretation of cardiac output measurements, examination of patient, ordering and performing treatments and interventions, re-evaluation of patient's condition and ordering and review of radiographic studies.        Labs Reviewed   CBC W/ AUTO DIFFERENTIAL - Abnormal; Notable for the following components:       Result Value    WBC 13.35 (*)     RBC 3.16 (*)     Hemoglobin 9.0 (*)     Hematocrit 27.8 (*)     RDW 14.6 (*)     Immature Granulocytes 1.4 (*)     Gran # (ANC) 10.5 (*)     Immature Grans (Abs) 0.19 (*)     Gran % 78.7 (*)     Lymph % 12.7 (*)     All other components within normal limits   COMPREHENSIVE METABOLIC PANEL - Abnormal; Notable for the following components:    Sodium 134 (*)     Glucose 161 (*)     BUN 64 (*)     Creatinine 2.8 (*)     Albumin 2.8 (*)     eGFR if  17 (*)     eGFR if non  15 (*)     All other components within normal limits   B-TYPE NATRIURETIC PEPTIDE - Abnormal; Notable for the following components:     (*)     All other components within normal limits   SEDIMENTATION RATE - Abnormal; Notable for the following components:    Sed Rate 120 (*)     All other components within normal limits   D DIMER, QUANTITATIVE - Abnormal; Notable for the following components:    D-Dimer 2.38 (*)     All other components within normal limits   URINALYSIS, REFLEX TO URINE CULTURE - Abnormal;  Notable for the following components:    Appearance, UA Hazy (*)     Protein, UA 2+ (*)     Glucose, UA Trace (*)     Leukocytes, UA 3+ (*)     All other components within normal limits    Narrative:     Specimen Source->Urine   SARS-COV-2 RDRP GENE - Abnormal; Notable for the following components:    POC Rapid COVID Positive (*)     All other components within normal limits    Narrative:     This test utilizes isothermal nucleic acid amplification   technology to detect the SARS-CoV-2 RdRp nucleic acid segment.   The analytical sensitivity (limit of detection) is 125 genome   equivalents/mL.   A POSITIVE result implies infection with the SARS-CoV-2 virus;   the patient is presumed to be contagious.     A NEGATIVE result means that SARS-CoV-2 nucleic acids are not   present above the limit of detection. A NEGATIVE result should be   treated as presumptive. It does not rule out the possibility of   COVID-19 and should not be the sole basis for treatment decisions.   If COVID-19 is strongly suspected based on clinical and exposure   history, re-testing using an alternate molecular assay should be   considered.   This test is only for use under the Food and Drug   Administration s Emergency Use Authorization (EUA).   Commercial kits are provided by [employer].   Performance characteristics of the EUA have been independently   verified by [facility] Department of   Pathology and Laboratory Medicine.   _________________________________________________________________   The authorized Fact Sheet for Healthcare Providers and the authorized Fact   Sheet for Patients of the ID NOW COVID-19 are available on the FDA   website:     https://www.fda.gov/media/853111/download  https://www.fda.gov/media/351883/download          INFLUENZA A & B BY MOLECULAR   TROPONIN I   CK   LACTATE DEHYDROGENASE   FERRITIN   LACTIC ACID, PLASMA   PROCALCITONIN   TROPONIN I   URINALYSIS MICROSCOPIC    Narrative:     Specimen  Source->Urine   POCT GLUCOSE MONITORING CONTINUOUS     EKG Readings: (Independently Interpreted)   Initial Reading: No STEMI. Previous EKG: Compared with most recent EKG Previous EKG Date: 5/11/2022 (Nonspecific change). Rhythm: Paced Rhythm. Heart Rate: 82. Ectopy: No Ectopy. ST Segments: Normal ST Segments. Axis: Normal.     ECG Results          EKG 12-lead (In process)  Result time 06/21/22 07:50:01    In process by Interface, Lab In [facility] (06/21/22 07:50:01)                 Narrative:    Test Reason : R06.02,    Vent. Rate : 081 BPM     Atrial Rate : 081 BPM     P-R Int : 152 ms          QRS Dur : 124 ms      QT Int : 446 ms       P-R-T Axes : 078 -67 106 degrees     QTc Int : 518 ms    Normal sinus rhythm  Left axis deviation  Right bundle branch block  LVH with repolarization abnormality  Cannot rule out Anterior infarct ,age undetermined  Abnormal ECG  When compared with ECG of 11-MAY-2022 09:13,  Sinus rhythm has replaced Electronic ventricular pacemaker    Referred By: [email or web address]   SELF           Confirmed By:                               X-Rays:   Independently Interpreted Readings:   Other Readings:  Imaging interpreted by radiologist and visualized by me:     Imaging Results          US Lower Extremity Veins Bilateral (Final result)  Result time 06/20/22 21:27:53    Final result by Greg Aguirre DO (06/20/22 21:27:53)                 Impression:      No evidence of deep venous thrombosis in either lower extremity.      Electronically signed by: Greg Aguirre  Date:    06/20/2022  Time:    21:27             Narrative:    EXAMINATION:  US LOWER EXTREMITY VEINS BILATERAL    CLINICAL HISTORY:  DVT rule out;    TECHNIQUE:  Duplex and color flow Doppler and dynamic compression was performed of the bilateral lower extremity veins was performed.  COVID positive protocol performed.    COMPARISON:  None    FINDINGS:  Right thigh veins: The common femoral, femoral, popliteal, upper greater saphenous, and deep  femoral veins are patent and free of thrombus. The veins are normally compressible and have normal phasic flow and augmentation response.    Right calf veins: Not evaluated, COVID positive protocol.    Left thigh veins: The common femoral, femoral, popliteal, upper greater saphenous, and deep femoral veins are patent and free of thrombus. The veins are normally compressible and have normal phasic flow and augmentation response.    Left calf veins: Not evaluated, COVID positive protocol.    Miscellaneous: None                                X-Ray Chest AP Portable (Final result)  Result time 06/20/22 14:48:35    Final result by Kulwinder Shields MD (06/20/22 14:48:35)                 Impression:      Findings worrisome for diffuse pulmonary edema with small right pleural effusion.    This report was flagged in Epic as abnormal.      Electronically signed by: Kulwinder Shields MD  Date:    06/20/2022  Time:    14:48             Narrative:    EXAMINATION:  XR CHEST AP PORTABLE    CLINICAL HISTORY:  Shortness of breath    TECHNIQUE:  Single frontal view of the chest was performed.    COMPARISON:  04/02/2022    FINDINGS:  Cardiac silhouette is stable in size.  There there is a left-sided AICD in stable position.  There are thoracic aortic calcifications.  There are increased bilateral interstitial markings with some airspace opacities in the right lower lung zone.  There is a small right pleural effusion.  No pneumothorax.  Bones show degenerative changes of the spine and shoulders with remote right-sided rib fracture.                                Medications   sodium chloride 0.9% flush 10 mL (has no administration in time range)   sodium chloride 0.9% flush 10 mL (has no administration in time range)   remdesivir 100 mg in sodium chloride 0.9% 250 mL infusion (has no administration in time range)   glucose chewable tablet 16 g (has no administration in time range)   glucose chewable tablet 24 g (has no administration in  time range)   glucagon (human recombinant) injection 1 mg (has no administration in time range)   albuterol inhaler 2 puff (2 puffs Inhalation Given 6/21/22 0034)   ascorbic acid (vitamin C) tablet 500 mg (500 mg Oral Given 6/20/22 2041)   multivitamin tablet (has no administration in time range)   enoxaparin injection 60 mg (60 mg Subcutaneous Given 6/20/22 1738)   benzonatate capsule 100 mg (100 mg Oral Given 6/20/22 1904)   carvediloL tablet 25 mg (25 mg Oral Given 6/20/22 2041)   fluticasone propionate 50 mcg/actuation nasal spray 100 mcg (has no administration in time range)   fluticasone furoate-vilanteroL 100-25 mcg/dose diskus inhaler 1 puff (has no administration in time range)   hydrALAZINE tablet 100 mg (100 mg Oral Given 6/20/22 2041)   montelukast tablet 10 mg (10 mg Oral Given 6/20/22 2041)   nitroGLYCERIN SL tablet 0.4 mg (has no administration in time range)   pravastatin tablet 40 mg (has no administration in time range)   sodium bicarbonate tablet 1,300 mg (1,300 mg Oral Given 6/20/22 2041)   tiotropium inhalation capsule 18 mcg (has no administration in time range)   insulin aspart U-100 pen 0-5 Units (has no administration in time range)   furosemide injection 40 mg (40 mg Intravenous Given 6/20/22 2041)   hydrALAZINE injection 10 mg (has no administration in time range)   albuterol-ipratropium 2.5 mg-0.5 mg/3 mL nebulizer solution 3 mL (3 mLs Nebulization Given 6/20/22 1506)   predniSONE tablet 40 mg (40 mg Oral Given 6/20/22 1426)   furosemide injection 40 mg (40 mg Intravenous Given 6/20/22 1454)   remdesivir 200 mg in sodium chloride 0.9% 250 mL infusion (0 mg Intravenous Stopped 6/20/22 1931)     Medical Decision Making:   Initial Assessment:   82-year-old female who presents emergency department shortness of breath.  Patient is alert oriented in no acute distress.  Patient is afebrile with vitals within normal limits.  Patient received 1 DuoNeb via EMS.  Oxygen saturation 96% on room air  currently I. Physical exam findings noted above.    Differential Diagnosis:   COPD exacerbation secondary to URI  Pneumonia  COVID  CHF exacerbation  Clinical Tests:   Lab Tests: Ordered and Reviewed  Radiological Study: Ordered and Reviewed  Medical Tests: Ordered and Reviewed  ED Management:  Patient presented for Shortness of breath. Bilateral wheezes with ronchi.Concern for COPD exacerbations vs CHF. Patient Started on duo nebs and prednisone 40 mg. Patient swabbed for COVID and positive. Chest X ray obtained to evaluate for any acute intrathoracic process. Chest X ray diffuse pulmonary edema with small right pleural effusion. Patient BNP elevated.Patient given IV lasix for Fluid overload. CBC and CMP obtained to evaluate for leukocytosis, anemia, metabolic derangement. Labs notable for Elevated WBC. Patient became hypoxic to 94% placed on nasal canula. Patient admitted for COPD exacerbation/ CHF exacerbation. Discussed plan with patient and family who are agreeable with plan.             Attending Attestation:   Physician Attestation Statement for Resident:  As the supervising MD   Physician Attestation Statement: I have personally seen and examined this patient.   I agree with the above history. -: Agree; 83 y/o F presents to the ED c/o worsening SOB   As the supervising MD I agree with the above PE.    As the supervising MD I agree with the above treatment, course, plan, and disposition.   -: Agree; Patient still requiring supplemental oxygen despite treatments. D/W [facility] team who will see and admit the patient. Patient comfortable with admission.   I have reviewed and agree with the residents interpretation of the following: lab data, x-rays and EKG.  I have reviewed the following: old records at this facility, old x-rays and old EKGs.                         Clinical Impression:   Final diagnoses:  [R06.02] Shortness of breath  [U07.1] COVID-19 virus infection (Primary)  [I50.9] Acute on  chronic congestive heart failure, unspecified heart failure type  [J44.1] COPD with acute exacerbation          ED Disposition Condition    Observation               Emily Euceda MD  Resident  06/20/22 1521       Arsenio Islas MD  06/21/22 [id number]

## 2022-06-20 NOTE — ASSESSMENT & PLAN NOTE
Patient presents with shortness of breath and PELAYO. Bibasilar rales and peripheral edema on exam; L >R edema  - consistent clinical presentation;   -Troponin 0.015; likely demand; will continue to monitor and trend  -CXR revealed diffuse pulmonary edema with right small pleural effusion  - initiated lasix 40mg IV  in ED, will continue at 40mg IV q12hr  - continue home BB, will hold ARB in setting of VIRGILIO  - Daily weights  -Strict I/Os  - Monitor on telemetry  - Monitor and trend BMP, Mg, and renal function; keep K >4, Mg >2  -Sodium restriction (<2g/d), fluid restriction (<2L)   - 2D echo to assess cardiac function and valvular dysfunction.

## 2022-06-20 NOTE — ASSESSMENT & PLAN NOTE
- COVID positive 6/20/22; initiated on protocol  - Isolation: Airborne/Droplet. Surgical mask on patient. Notify Infection Control  - CXR with diffuse pulmonary edema with small right pleural effusion, requiring O2  - Monitor on Telemetry  -Received 40mg PO Prednisone x1 in ED   - initiated on remdesivir x5d; daily CMP  - CRP pending; D-dimer 2.38  -Covid Labs pending  - Order RT consult via Respiratory Communication for COVID Protocols  - Incentive Spirometer Q4h to promote lung compliance   - Continuous Pulse Oximetry, goal SpO2 92-96%  - supplemental O2 PRN, will wean as tolerated  - Albuterol INH Q6h scheduled & PRN   - MVI & ascorbic acid 500mg PO BID  -Anti-tussives for cough  - Acetaminophen Q6hr PRN fever/headache  - Loperamide PRN viral diarrhea  - VTE PPx: enoxaparin 1mg/kg q24h in setting of VIRGILIO  - PT/OT for debility/weakness  - If deterioration, may warrant trial of NIPPV in neg pressure room or immediate ICU consult

## 2022-06-20 NOTE — ASSESSMENT & PLAN NOTE
Mycobacterium avium complex    -Followed by ID, Dr. Amaya; was placed on Voriconazole therapy but unable to tolerate  -Presents to ED with worsening SOB not improved with recent trial of Augmentin and Prednisone  -Procalcitonin pending  -ID consulted to evaluate and assist in antibiotic administration

## 2022-06-21 PROBLEM — N39.0 UTI (URINARY TRACT INFECTION): Status: ACTIVE | Noted: 2022-06-21

## 2022-06-21 PROBLEM — J96.01 ACUTE RESPIRATORY FAILURE WITH HYPOXIA: Status: ACTIVE | Noted: 2022-06-21

## 2022-06-21 LAB
ANION GAP SERPL CALC-SCNC: 14 MMOL/L (ref 8–16)
ANION GAP SERPL CALC-SCNC: 17 MMOL/L (ref 8–16)
AORTIC ROOT ANNULUS: 2.96 CM
APTT BLDCRRT: 35.2 SEC (ref 21–32)
AV INDEX (PROSTH): 0.6
AV MEAN GRADIENT: 7 MMHG
AV PEAK GRADIENT: 12 MMHG
AV VALVE AREA: 1.99 CM2
AV VELOCITY RATIO: 0.54
B-OH-BUTYR BLD STRIP-SCNC: 0.2 MMOL/L (ref 0–0.5)
BASOPHILS # BLD AUTO: 0.03 K/UL (ref 0–0.2)
BASOPHILS NFR BLD: 0.2 % (ref 0–1.9)
BSA FOR ECHO PROCEDURE: 1.65 M2
BUN SERPL-MCNC: 65 MG/DL (ref 8–23)
BUN SERPL-MCNC: 81 MG/DL (ref 8–23)
CALCIUM SERPL-MCNC: 8.4 MG/DL (ref 8.7–10.5)
CALCIUM SERPL-MCNC: 8.6 MG/DL (ref 8.7–10.5)
CHLORIDE SERPL-SCNC: 88 MMOL/L (ref 95–110)
CHLORIDE SERPL-SCNC: 96 MMOL/L (ref 95–110)
CO2 SERPL-SCNC: 22 MMOL/L (ref 23–29)
CO2 SERPL-SCNC: 25 MMOL/L (ref 23–29)
CREAT SERPL-MCNC: 2.6 MG/DL (ref 0.5–1.4)
CREAT SERPL-MCNC: 3.7 MG/DL (ref 0.5–1.4)
CV ECHO LV RWT: 0.54 CM
DIFFERENTIAL METHOD: ABNORMAL
DOP CALC AO PEAK VEL: 1.7 M/S
DOP CALC AO VTI: 41.3 CM
DOP CALC LVOT AREA: 3.3 CM2
DOP CALC LVOT DIAMETER: 2.06 CM
DOP CALC LVOT PEAK VEL: 0.91 M/S
DOP CALC LVOT STROKE VOLUME: 82.05 CM3
DOP CALC MV VTI: 35.36 CM
DOP CALCLVOT PEAK VEL VTI: 24.63 CM
E WAVE DECELERATION TIME: 248.45 MSEC
E/A RATIO: 1.56
E/E' RATIO: 21.38 M/S
ECHO LV POSTERIOR WALL: 1.18 CM (ref 0.6–1.1)
EJECTION FRACTION: 55 %
EOSINOPHIL # BLD AUTO: 0 K/UL (ref 0–0.5)
EOSINOPHIL NFR BLD: 0 % (ref 0–8)
ERYTHROCYTE [DISTWIDTH] IN BLOOD BY AUTOMATED COUNT: 14.6 % (ref 11.5–14.5)
EST. GFR  (AFRICAN AMERICAN): 12 ML/MIN/1.73 M^2
EST. GFR  (AFRICAN AMERICAN): 19 ML/MIN/1.73 M^2
EST. GFR  (NON AFRICAN AMERICAN): 11 ML/MIN/1.73 M^2
EST. GFR  (NON AFRICAN AMERICAN): 16 ML/MIN/1.73 M^2
ESTIMATED AVG GLUCOSE: 180 MG/DL (ref 68–131)
FRACTIONAL SHORTENING: 29 % (ref 28–44)
GLUCOSE SERPL-MCNC: 270 MG/DL (ref 70–110)
GLUCOSE SERPL-MCNC: 710 MG/DL (ref 70–110)
HBA1C MFR BLD: 7.9 % (ref 4–5.6)
HCT VFR BLD AUTO: 27.9 % (ref 37–48.5)
HGB BLD-MCNC: 8.6 G/DL (ref 12–16)
IMM GRANULOCYTES # BLD AUTO: 0.16 K/UL (ref 0–0.04)
IMM GRANULOCYTES NFR BLD AUTO: 1.3 % (ref 0–0.5)
INR PPP: 1.1 (ref 0.8–1.2)
INTERVENTRICULAR SEPTUM: 1.26 CM (ref 0.6–1.1)
LA MAJOR: 6.07 CM
LA MINOR: 5.63 CM
LA WIDTH: 4.52 CM
LEFT ATRIUM SIZE: 4.19 CM
LEFT ATRIUM VOLUME INDEX MOD: 35.7 ML/M2
LEFT ATRIUM VOLUME INDEX: 57.3 ML/M2
LEFT ATRIUM VOLUME MOD: 58.59 CM3
LEFT ATRIUM VOLUME: 94.04 CM3
LEFT INTERNAL DIMENSION IN SYSTOLE: 3.12 CM (ref 2.1–4)
LEFT VENTRICLE DIASTOLIC VOLUME INDEX: 53.07 ML/M2
LEFT VENTRICLE DIASTOLIC VOLUME: 87.04 ML
LEFT VENTRICLE MASS INDEX: 119 G/M2
LEFT VENTRICLE SYSTOLIC VOLUME INDEX: 23.5 ML/M2
LEFT VENTRICLE SYSTOLIC VOLUME: 38.57 ML
LEFT VENTRICULAR INTERNAL DIMENSION IN DIASTOLE: 4.39 CM (ref 3.5–6)
LEFT VENTRICULAR MASS: 195.29 G
LV LATERAL E/E' RATIO: 19.86 M/S
LV SEPTAL E/E' RATIO: 23.17 M/S
LYMPHOCYTES # BLD AUTO: 0.7 K/UL (ref 1–4.8)
LYMPHOCYTES NFR BLD: 5.9 % (ref 18–48)
MAGNESIUM SERPL-MCNC: 2.2 MG/DL (ref 1.6–2.6)
MCH RBC QN AUTO: 28.1 PG (ref 27–31)
MCHC RBC AUTO-ENTMCNC: 30.8 G/DL (ref 32–36)
MCV RBC AUTO: 91 FL (ref 82–98)
MONOCYTES # BLD AUTO: 0.1 K/UL (ref 0.3–1)
MONOCYTES NFR BLD: 0.7 % (ref 4–15)
MV A" WAVE DURATION": 10.28 MSEC
MV MEAN GRADIENT: 1 MMHG
MV PEAK A VEL: 0.89 M/S
MV PEAK E VEL: 1.39 M/S
MV PEAK GRADIENT: 7 MMHG
MV STENOSIS PRESSURE HALF TIME: 72.05 MS
MV VALVE AREA BY CONTINUITY EQUATION: 2.32 CM2
MV VALVE AREA P 1/2 METHOD: 3.05 CM2
NEUTROPHILS # BLD AUTO: 11.6 K/UL (ref 1.8–7.7)
NEUTROPHILS NFR BLD: 91.9 % (ref 38–73)
NRBC BLD-RTO: 0 /100 WBC
PISA TR MAX VEL: 3.47 M/S
PLATELET # BLD AUTO: 258 K/UL (ref 150–450)
PMV BLD AUTO: 10.2 FL (ref 9.2–12.9)
POCT GLUCOSE: 251 MG/DL (ref 70–110)
POCT GLUCOSE: 449 MG/DL (ref 70–110)
POCT GLUCOSE: >500 MG/DL (ref 70–110)
POTASSIUM SERPL-SCNC: 4.7 MMOL/L (ref 3.5–5.1)
POTASSIUM SERPL-SCNC: 4.8 MMOL/L (ref 3.5–5.1)
PROTHROMBIN TIME: 11 SEC (ref 9–12.5)
PULM VEIN S/D RATIO: 0.76
PV PEAK D VEL: 0.96 M/S
PV PEAK S VEL: 0.73 M/S
PV PEAK VELOCITY: 1.33 CM/S
RA MAJOR: 5.11 CM
RA PRESSURE: 8 MMHG
RA WIDTH: 4.46 CM
RBC # BLD AUTO: 3.06 M/UL (ref 4–5.4)
SODIUM SERPL-SCNC: 127 MMOL/L (ref 136–145)
SODIUM SERPL-SCNC: 135 MMOL/L (ref 136–145)
TDI LATERAL: 0.07 M/S
TDI SEPTAL: 0.06 M/S
TDI: 0.07 M/S
TR MAX PG: 48 MMHG
TRICUSPID ANNULAR PLANE SYSTOLIC EXCURSION: 2.28 CM
TROPONIN I SERPL DL<=0.01 NG/ML-MCNC: 0.02 NG/ML (ref 0–0.03)
TV REST PULMONARY ARTERY PRESSURE: 56 MMHG
WBC # BLD AUTO: 12.63 K/UL (ref 3.9–12.7)

## 2022-06-21 PROCEDURE — 94640 AIRWAY INHALATION TREATMENT: CPT | Mod: XB

## 2022-06-21 PROCEDURE — 36415 COLL VENOUS BLD VENIPUNCTURE: CPT

## 2022-06-21 PROCEDURE — 99900035 HC TECH TIME PER 15 MIN (STAT)

## 2022-06-21 PROCEDURE — G0378 HOSPITAL OBSERVATION PER HR: HCPCS

## 2022-06-21 PROCEDURE — 25000003 PHARM REV CODE 250

## 2022-06-21 PROCEDURE — 83605 ASSAY OF LACTIC ACID: CPT

## 2022-06-21 PROCEDURE — 80048 BASIC METABOLIC PNL TOTAL CA: CPT | Mod: 91

## 2022-06-21 PROCEDURE — 63600175 PHARM REV CODE 636 W HCPCS

## 2022-06-21 PROCEDURE — 85610 PROTHROMBIN TIME: CPT

## 2022-06-21 PROCEDURE — C9399 UNCLASSIFIED DRUGS OR BIOLOG: HCPCS | Performed by: NURSE PRACTITIONER

## 2022-06-21 PROCEDURE — 87086 URINE CULTURE/COLONY COUNT: CPT | Performed by: NURSE PRACTITIONER

## 2022-06-21 PROCEDURE — 94761 N-INVAS EAR/PLS OXIMETRY MLT: CPT

## 2022-06-21 PROCEDURE — 84484 ASSAY OF TROPONIN QUANT: CPT

## 2022-06-21 PROCEDURE — 85730 THROMBOPLASTIN TIME PARTIAL: CPT

## 2022-06-21 PROCEDURE — 81000 URINALYSIS NONAUTO W/SCOPE: CPT

## 2022-06-21 PROCEDURE — 27000221 HC OXYGEN, UP TO 24 HOURS

## 2022-06-21 PROCEDURE — 94760 N-INVAS EAR/PLS OXIMETRY 1: CPT

## 2022-06-21 PROCEDURE — 63600175 PHARM REV CODE 636 W HCPCS: Performed by: NURSE PRACTITIONER

## 2022-06-21 PROCEDURE — 25000242 PHARM REV CODE 250 ALT 637 W/ HCPCS

## 2022-06-21 PROCEDURE — 82010 KETONE BODYS QUAN: CPT

## 2022-06-21 PROCEDURE — 25000003 PHARM REV CODE 250: Performed by: NURSE PRACTITIONER

## 2022-06-21 PROCEDURE — 27100098 HC SPACER

## 2022-06-21 PROCEDURE — 51702 INSERT TEMP BLADDER CATH: CPT

## 2022-06-21 PROCEDURE — 96372 THER/PROPH/DIAG INJ SC/IM: CPT | Performed by: NURSE PRACTITIONER

## 2022-06-21 PROCEDURE — 83036 HEMOGLOBIN GLYCOSYLATED A1C: CPT

## 2022-06-21 PROCEDURE — 83735 ASSAY OF MAGNESIUM: CPT

## 2022-06-21 PROCEDURE — 96372 THER/PROPH/DIAG INJ SC/IM: CPT

## 2022-06-21 PROCEDURE — 85025 COMPLETE CBC W/AUTO DIFF WBC: CPT

## 2022-06-21 RX ORDER — SODIUM CHLORIDE 0.9 % (FLUSH) 0.9 %
10 SYRINGE (ML) INJECTION
Status: DISCONTINUED | OUTPATIENT
Start: 2022-06-21 | End: 2022-07-01 | Stop reason: HOSPADM

## 2022-06-21 RX ORDER — INSULIN ASPART 100 [IU]/ML
1-10 INJECTION, SOLUTION INTRAVENOUS; SUBCUTANEOUS
Status: DISCONTINUED | OUTPATIENT
Start: 2022-06-21 | End: 2022-06-22

## 2022-06-21 RX ORDER — ACETAMINOPHEN 325 MG/1
650 TABLET ORAL EVERY 4 HOURS PRN
Status: DISCONTINUED | OUTPATIENT
Start: 2022-06-21 | End: 2022-07-01 | Stop reason: HOSPADM

## 2022-06-21 RX ORDER — CEFEPIME HYDROCHLORIDE 1 G/50ML
1 INJECTION, SOLUTION INTRAVENOUS
Status: DISCONTINUED | OUTPATIENT
Start: 2022-06-21 | End: 2022-06-21

## 2022-06-21 RX ORDER — ONDANSETRON 2 MG/ML
4 INJECTION INTRAMUSCULAR; INTRAVENOUS EVERY 6 HOURS PRN
Status: DISCONTINUED | OUTPATIENT
Start: 2022-06-21 | End: 2022-07-01 | Stop reason: HOSPADM

## 2022-06-21 RX ORDER — CEFEPIME HYDROCHLORIDE 1 G/50ML
1 INJECTION, SOLUTION INTRAVENOUS
Status: DISPENSED | OUTPATIENT
Start: 2022-06-22 | End: 2022-06-25

## 2022-06-21 RX ORDER — INSULIN ASPART 100 [IU]/ML
7 INJECTION, SOLUTION INTRAVENOUS; SUBCUTANEOUS ONCE
Status: COMPLETED | OUTPATIENT
Start: 2022-06-21 | End: 2022-06-21

## 2022-06-21 RX ORDER — CIPROFLOXACIN 250 MG/1
250 TABLET, FILM COATED ORAL EVERY 12 HOURS
Status: DISCONTINUED | OUTPATIENT
Start: 2022-06-21 | End: 2022-06-21

## 2022-06-21 RX ORDER — CIPROFLOXACIN 500 MG/1
500 TABLET ORAL EVERY 12 HOURS
Status: DISCONTINUED | OUTPATIENT
Start: 2022-06-21 | End: 2022-06-21

## 2022-06-21 RX ADMIN — OXYCODONE HYDROCHLORIDE AND ACETAMINOPHEN 500 MG: 500 TABLET ORAL at 08:06

## 2022-06-21 RX ADMIN — TIOTROPIUM BROMIDE 18 MCG: 18 CAPSULE ORAL; RESPIRATORY (INHALATION) at 08:06

## 2022-06-21 RX ADMIN — SODIUM BICARBONATE 1300 MG: 650 TABLET ORAL at 08:06

## 2022-06-21 RX ADMIN — HYDRALAZINE HYDROCHLORIDE 100 MG: 25 TABLET, FILM COATED ORAL at 10:06

## 2022-06-21 RX ADMIN — ALBUTEROL SULFATE 2 PUFF: 90 AEROSOL, METERED RESPIRATORY (INHALATION) at 07:06

## 2022-06-21 RX ADMIN — THERA TABS 1 TABLET: TAB at 10:06

## 2022-06-21 RX ADMIN — INSULIN DETEMIR 22 UNITS: 100 INJECTION, SOLUTION SUBCUTANEOUS at 05:06

## 2022-06-21 RX ADMIN — ALBUTEROL SULFATE 2 PUFF: 90 AEROSOL, METERED RESPIRATORY (INHALATION) at 12:06

## 2022-06-21 RX ADMIN — FUROSEMIDE 40 MG: 10 INJECTION, SOLUTION INTRAMUSCULAR; INTRAVENOUS at 08:06

## 2022-06-21 RX ADMIN — INSULIN ASPART 5 UNITS: 100 INJECTION, SOLUTION INTRAVENOUS; SUBCUTANEOUS at 11:06

## 2022-06-21 RX ADMIN — SODIUM CHLORIDE 1000 ML: 0.9 INJECTION, SOLUTION INTRAVENOUS at 11:06

## 2022-06-21 RX ADMIN — CEFEPIME HYDROCHLORIDE 1 G: 1 INJECTION, SOLUTION INTRAVENOUS at 05:06

## 2022-06-21 RX ADMIN — OXYCODONE HYDROCHLORIDE AND ACETAMINOPHEN 500 MG: 500 TABLET ORAL at 10:06

## 2022-06-21 RX ADMIN — HYDRALAZINE HYDROCHLORIDE 100 MG: 25 TABLET, FILM COATED ORAL at 08:06

## 2022-06-21 RX ADMIN — ALBUTEROL SULFATE 2 PUFF: 90 AEROSOL, METERED RESPIRATORY (INHALATION) at 01:06

## 2022-06-21 RX ADMIN — CARVEDILOL 25 MG: 25 TABLET, FILM COATED ORAL at 08:06

## 2022-06-21 RX ADMIN — INSULIN HUMAN 10 UNITS: 100 INJECTION, SOLUTION PARENTERAL at 08:06

## 2022-06-21 RX ADMIN — INSULIN ASPART 7 UNITS: 100 INJECTION, SOLUTION INTRAVENOUS; SUBCUTANEOUS at 05:06

## 2022-06-21 RX ADMIN — DEXAMETHASONE 6 MG: 4 TABLET ORAL at 10:06

## 2022-06-21 RX ADMIN — FLUTICASONE FUROATE AND VILANTEROL TRIFENATATE 1 PUFF: 100; 25 POWDER RESPIRATORY (INHALATION) at 08:06

## 2022-06-21 RX ADMIN — SODIUM BICARBONATE 1300 MG: 650 TABLET ORAL at 10:06

## 2022-06-21 RX ADMIN — MONTELUKAST 10 MG: 10 TABLET, FILM COATED ORAL at 08:06

## 2022-06-21 RX ADMIN — INSULIN ASPART 10 UNITS: 100 INJECTION, SOLUTION INTRAVENOUS; SUBCUTANEOUS at 07:06

## 2022-06-21 RX ADMIN — PRAVASTATIN SODIUM 40 MG: 40 TABLET ORAL at 10:06

## 2022-06-21 RX ADMIN — REMDESIVIR 100 MG: 100 INJECTION, POWDER, LYOPHILIZED, FOR SOLUTION INTRAVENOUS at 10:06

## 2022-06-21 RX ADMIN — SODIUM BICARBONATE 1300 MG: 650 TABLET ORAL at 02:06

## 2022-06-21 RX ADMIN — FUROSEMIDE 40 MG: 10 INJECTION, SOLUTION INTRAMUSCULAR; INTRAVENOUS at 11:06

## 2022-06-21 RX ADMIN — ALBUTEROL SULFATE 2 PUFF: 90 AEROSOL, METERED RESPIRATORY (INHALATION) at 08:06

## 2022-06-21 RX ADMIN — FLUTICASONE PROPIONATE 100 MCG: 50 SPRAY, METERED NASAL at 10:06

## 2022-06-21 RX ADMIN — ENOXAPARIN SODIUM 60 MG: 100 INJECTION SUBCUTANEOUS at 05:06

## 2022-06-21 RX ADMIN — CARVEDILOL 25 MG: 25 TABLET, FILM COATED ORAL at 10:06

## 2022-06-21 NOTE — ED NOTES
RN in room with patient. Patient and family updated on plan of care. VSS. placedo n 2 L NC for comfort and O2 sat 91-92% on RA while sleeping. Patient complaints of feeling a lot of pressure in her bladder. Bladder scanned for 1240. Admit team notified. RN spoke with SHELBI Perera. Orders for lauren catheter obtained.

## 2022-06-21 NOTE — ASSESSMENT & PLAN NOTE
Estimated Creatinine Clearance: 13.6 mL/min (A) (based on SCr of 2.6 mg/dL (H)).  Baseline Cr: 2.1Baseline BUN:46   Recent Labs   Lab 06/20/22  1425 06/21/22  0232   CO2 29 25   BUN 64* 65*   CREATININE 2.8* 2.6*   MG  --  2.2       -Etiology likely 2/2 in setting of ADHF  -Will hold on fluid administration and continue diuresis with IV Lasix  -Monitor strict urine output  - Continue to monitor renal function with daily labs and trend electrolytes  - renally dose medications  - avoid nephrotoxic agents including NSAIDs, aminoglycosides, IV contrast (unless absolutely necessary), gadolinium, fleets and other phosphorous-based laxatives  - monitor events that may lead to decreased renal perfusion (hypovolemia, hypotension, sepsis)  - monitor urine output to assure that no obstruction precipitates worsening in GFR

## 2022-06-21 NOTE — PLAN OF CARE
SW met with pt at bedside to complete assessment. Pt is AxO 3 and able to verbally answer assessment questions. Pt is able to confirm demographic information. Pt has daughter Betsy at bedside. Pt reports living at home alone and feeling safe. At time of discharge pt will be transported by family back to her home. Pt reports either her daughter Betsy or another one of her sons will transport. Pt reports having enough support. Pt daughter Betsy reported pt being independent of ADL's. Current DME include rollator, cane, shower chair, and grab bars. Family feels as if they have enough support and able to manage pt needs currently. Family open to PCC due to pt current PCP leaving her office soon. Family confirms ability to get pt to and from appointments. SW updated whiteboard with [facility] name and contact information. SW confirmed pt understanding of Observation unit and expected discharge plan. SW will continue to follow pt throughout care and assist with any discharge needs.         06/21/22 1204   Discharge Planning   Resource/Environmental Concerns none   Support Systems Family members;Children   Equipment Currently Used at Home cane, straight;rollator;grab bar   Current Living Arrangements home/apartment/condo   Patient/Family Anticipates Transition to home   Patient/Family Anticipated Services at Transition none   DME Needed Upon Discharge  none   Discharge Plan A Home     Future Appointments   Date Time Provider Department Center   6/28/2022  8:10 AM Steffen Osborn MD [facility] UROLOGY [facility]   7/7/2022 11:00 AM CARRIE Arechiga MD [facility] OPHTHAL [facility]   7/11/2022  2:00 PM Leonides Fuentes MD [facility] DERM [facility]   7/12/2022 10:00 AM [facility] CT1 LIMIT 450 LBS [facility] CT SCAN [facility]   7/26/2022 11:00 AM Catrachita Rothman MD [facility] IM [facility]   8/6/2022 10:00 AM HOME MONITOR DEVICE CHECK, [facility] ARRHPRO [facility]   8/23/2022  9:45 AM Mansi Borrero MD [facility]   8/25/2022 11:30 AM Diaz SOTOMAYOR  MD [facility] DERMSUR [address]   9/1/2022  9:00 AM Eric Rivera PA-C [facility] UROGYN [facility]   9/6/2022  1:30 PM Seng Salgado MD [facility] CARDIO [place]   9/26/2022  8:15 AM LAB, [facility]   10/3/2022  9:00 AM Elidia Madison NP [facility]      DYLAN Wright Case Management  [phone]

## 2022-06-21 NOTE — ASSESSMENT & PLAN NOTE
82 y.o. female with a PMH of NICM/CHF s/p biventricular ICD, CKD4 (baseline Cr about 2.5), T2DM (A1C=7.9 on 6/21/22), history of breast cancer, severe persistent asthma/COPD c/b ABPA following pulmonologist Dr. Maki and ID Dr. Amaya at [facility]; admitted on 6/20/2022 with a week of worsening shortness of breath; found to have COVID-19 positive and concern for heart failure exacerbation. History of ABPA but not tolerating voriconazole (not on itraconazole due to CHF). Sputum culture in 3/2022 with MAC but not met diagnostic criteria for pulmonary MAC infection.     Recommendations:  - Clinical picture is well explained by COVID-19 infection and/or heart failure exacerbation. Continue redemsivir and steroid treatment per COVID-19 treatment protocol, and diuresis.   - Okay to hold off antifungal and defer to outpatient ID. Per IDSA Aspergillosis guideline, oral/inhaled corticosteroid with oral itraconazole (alternative includes voriconazole, posaconazole, inhaled AmB) for symptomatic patients. Patient is up to date with PCV/PPSV, but would need HiB vaccine as well. Avoid fungal exposures (farming, house renovation, composting).   - Given pseudomonas found on recent sputum culture, reasonable to have 5 day course of IV cefepime 1 g q24h (CKD4). Would avoid oral FQ given prolonged QTc and significant cardiac history.

## 2022-06-21 NOTE — ASSESSMENT & PLAN NOTE
- COVID positive 6/20/22; initiated on protocol  - Isolation: Airborne/Droplet. Surgical mask on patient. Notify Infection Control  - CXR with diffuse pulmonary edema with small right pleural effusion, requiring O2  - Monitor on Telemetry  -Received 40mg PO Prednisone x1 in ED   - initiated on remdesivir x5d; daily CMP  - CRP pending; D-dimer 2.38  -Covid Labs pending  - Order RT consult via Respiratory Communication for COVID Protocols  - Incentive Spirometer Q4h to promote lung compliance   - Continuous Pulse Oximetry, goal SpO2 92-96%  - supplemental O2 PRN, will wean as tolerated  - Albuterol INH Q6h scheduled & PRN   - MVI & ascorbic acid 500mg PO BID  -Anti-tussives for cough  - Acetaminophen Q6hr PRN fever/headache  - Loperamide PRN viral diarrhea  - VTE PPx: enoxaparin 1mg/kg q24h in setting of VIRGILIO  - PT/OT for debility/weakness  - If deterioration, may warrant trial of NIPPV in neg pressure room or immediate ICU consult  - Add dexamethasone

## 2022-06-21 NOTE — H&P
"[facility] Medicine  History & Physical    Patient Name: Myesha Quinones  MRN: [id number]  Patient Class: OP- Observation  Admission Date: 6/20/2022  Attending Physician: Caroline Anderson MD   Primary Care Provider: Catrachita Rothman MD         Patient information was obtained from patient, past medical records and ER records.     Subjective:     Principal Problem:Acute on chronic combined systolic and diastolic CHF (congestive heart failure)    Chief Complaint:   Chief Complaint   Patient presents with    Shortness of Breath     C/o worsening sob diagnosed with lung infection recently.        HPI: Myesha Quinones has a past medical history of CAD, combined systolic and diastolic heart failure, LBBB, biventricular ICD, CKD4, IDDM, breast cancer, HTN, HLD, severe persistent asthma, COPD, and HLD presents to [facility] ED with complaints of SOB. She states her SOB is associated with a productive cough. She states her SOB has been progressively worsened over the past two weeks. She reports she was seen by her pulmonologist and was prescribed antibiotic and prednisone which she started last Tuesday. She states she did not complete her antibiotics or steroid therapy. She denies fever, chest pain, palpitations, N/V, or diarrhea.     She was seen by her pulmonolgist, Dr. Maki on 6/13/22 for shortness of breath. Reported cough and congestion attributing to sinus infection. She was also noted to have a current MAC infection which she is not on therapy. She was started on 10 day course of Augmentin with 10mg PO prednisone to accompany.     Of Note: She was primarily followed by ID, Dr. Amaya, for ABPA (allergic bronchopulmonary aspergillosis) and was started on Voriconazole therapy in which she did not tolerate.       In the ED: BP (!) 165/107   Pulse 83   Temp 98 °F (36.7 °C) (Axillary)   Resp (!) 22   Ht 5' 3" (1.6 m)   Wt 59 kg (130 lb)   LMP  (LMP Unknown)   SpO2 (!) 94%   BMI 23.03 kg/m² Labs " revealed WBC 13.35, H/H 9/27.8, BUN 64, creatinine 2.8, Glucose 161, . Troponin 0.015. 12 lead EKG revealed Normal sinus rhythm, Left axis deviation, Right bundle branch block. She is Covid positive. CXR revealed diffuse pulmonary edema with a right small pleural effusion. She received Duo-neb tx x1, Lasix 40mg IV x1, and Prednisone 40mg PO x1. ID consulted to follow.         Past Medical History:   Diagnosis Date    Acute hypoxemic respiratory failure 12/19/2019    Acute right-sided thoracic back pain 12/09/2019    Allergy     Asthma     Basal cell carcinoma     left forehead    Basal cell carcinoma     left nose    Basal cell carcinoma 05/20/2015    right nose    Basal cell carcinoma 12/22/2015    left lower post neck    Basal cell carcinoma 12/03/2019    left ant scalp     Bilateral renal cysts     Breast cancer     CAD (coronary artery disease)     Cardiomyopathy     Cardiomyopathy, ischemic     Cataract     CHF (congestive heart failure)     CKD (chronic kidney disease) stage 4, GFR 15-29 ml/min     Colon polyp 2011    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, with long-term current use of insulin 02/22/2018    COPD (chronic obstructive pulmonary disease)     COPD exacerbation 04/08/2018    Current mild episode of major depressive disorder without prior episode 01/25/2022    Defibrillator discharge     Diabetes mellitus     Diabetes mellitus type II     Diabetes with neurologic complications     Edema     Goiter     MNG    Hematuria, unspecified     HX: breast cancer     Hyperlipidemia     Hypertension     Hypocalcemia     Hypokalemia     Hyponatremia     Hyponatremia 4/2/2022    Iron deficiency anemia 05/16/2017    Iron deficiency anemia     Left kidney mass     Meningioma     Microalbuminuria due to type 2 diabetes mellitus 01/26/2022    Osteoporosis, postmenopausal     Pneumonia  12/08/2019    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    Proteinuria 01/21/2019    Pseudomonas pneumonia     Skin cancer     s/p excision    Sleep apnea     CPAP    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/2015    mid forehead    Unspecified vitamin D deficiency     UTI (urinary tract infection) 04/02/2022    Ventricular tachycardia     Vitamin B12 deficiency     Vitamin D deficiency disease        Past Surgical History:   Procedure Laterality Date    BASAL CELL CARCINOMA EXCISION      posterior neck and nose    BREAST BIOPSY      BREAST CYST EXCISION Left     BREAST SURGERY      CARDIAC DEFIBRILLATOR PLACEMENT      x 2    CATARACT EXTRACTION W/  INTRAOCULAR LENS IMPLANT Bilateral     CHOLECYSTECTOMY      COLONOSCOPY N/A 11/5/2019    Procedure: COLONOSCOPY;  Surgeon: Boaz Botello MD;  Location: [facility] ENDO (4TH FLR);  Service: Endoscopy;  Laterality: N/A;  AICD - [employer] -     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    FRACTURE SURGERY      left elbow and wrist as a child    HYSTERECTOMY      MASTECTOMY Right     REPLACEMENT OF IMPLANTABLE CARDIOVERTER-DEFIBRILLATOR (ICD) GENERATOR N/A 12/17/2018    Procedure: REPLACEMENT, ICD GENERATOR;  Surgeon: Jan Mckeon MD;  Location: [facility] EP LAB;  Service: Cardiology;  Laterality: N/A;  DONNA, CRT-D gen sulema, MDT, MAC, SK, 3 Prep    REVISION OF SKIN POCKET FOR CARDIOVERTER-DEFIBRILLATOR  12/17/2018    Procedure: Revision, Skin Pocket, For Cardioverter-Defibrillator;  Surgeon: Jan Mckeon MD;  Location: [facility] EP LAB;  Service: Cardiology;;    SQUAMOUS CELL CARCINOMA EXCISION      remved from forehead    TONSILLECTOMY         Review of patient's allergies indicates:   Allergen Reactions    Iodine and iodide containing products Hives    Nifedipine      weakness       No current facility-administered medications on file prior to encounter.     Current Outpatient Medications on File Prior to Encounter   Medication Sig    albuterol  (PROVENTIL/VENTOLIN HFA) 90 mcg/actuation inhaler INHALE 2 PUFFS INTO THE LUNGS EVERY 6 (SIX) HOURS AS NEEDED FOR WHEEZING. RESCUE    albuterol-ipratropium (DUO-NEB) 2.5 mg-0.5 mg/3 mL nebulizer solution TAKE 3 MLS BY NEBULIZATION EVERY 4 (FOUR) HOURS AS NEEDED FOR WHEEZING.    amoxicillin-clavulanate 875-125mg (AUGMENTIN) 875-125 mg per tablet Take 1 tablet by mouth every 12 (twelve) hours. for 10 days    benzonatate (TESSALON) 100 MG capsule Take 1 capsule (100 mg total) by mouth 3 (three) times daily as needed for Cough.    blood sugar diagnostic (ACCU-CHEK HECTOR) Strp Uses Accu-Check Hector meter to test BG 4x/day    carvediloL (COREG) 25 MG tablet TAKE 2 TABLETS (50 MG TOTAL) BY MOUTH 2 (TWO) TIMES DAILY.    cholecalciferol, vitamin D3, (VITAMIN D3) 50 mcg (2,000 unit) Tab Take 1 tablet by mouth once daily.     estradioL (ESTRACE) 0.01 % (0.1 mg/gram) vaginal cream Place 1 g vaginally every Mon, Wed, Fri.    fluticasone propionate (FLONASE) 50 mcg/actuation nasal spray 2 sprays (100 mcg total) by Each Nostril route once daily. (Patient taking differently: 2 sprays by Each Nostril route daily as needed.)    fluticasone-salmeterol diskus inhaler 250-50 mcg Inhale 1 puff into the lungs as needed. Controller    furosemide (LASIX) 40 MG tablet Take 1 tablet (40 mg total) by mouth every 8 (eight) hours as needed (swelling).    furosemide (LASIX) 80 MG tablet TAKE 1 TABLET BY MOUTH EVERY 8 HOURS AS NEEDED FOR SWELLING    hydrALAZINE (APRESOLINE) 100 MG tablet Take 1 tablet (100 mg total) by mouth 2 (two) times daily.    insulin (LANTUS SOLOSTAR U-100 INSULIN) glargine 100 units/mL (3mL) SubQ pen Inject 22 Units into the skin once daily.    lancets (ACCU-CHEK SOFTCLIX LANCETS) Misc Uses Accu-Chek Hector meter to test BG 1x/day    levocetirizine (XYZAL) 5 MG tablet Take 5 mg by mouth as needed.    linaGLIPtin (TRADJENTA) 5 mg Tab tablet Take 1 tablet (5 mg total) by mouth once daily.    magnesium oxide  "(MAG-OX) 400 mg tablet Take 1 tablet (400 mg total) by mouth once daily.    montelukast (SINGULAIR) 10 mg tablet Take 1 tablet (10 mg total) by mouth every evening. (Patient taking differently: Take 10 mg by mouth daily as needed.)    nitroGLYCERIN (NITROSTAT) 0.4 MG SL tablet Place 0.4 mg under the tongue every 5 (five) minutes as needed for Chest pain.     omega-3 fatty acids 500 mg Cap Take 1 capsule by mouth Twice daily.    pen needle, diabetic (BD ULTRA-FINE MINI PEN NEEDLE) 31 gauge x 3/16" Ndle USE WITH LANTUS AT BEDTIME    polyethylene glycol (GLYCOLAX) 17 gram PwPk Take 17 g by mouth daily as needed (constipation).    pravastatin (PRAVACHOL) 40 MG tablet TAKE 1 TABLET BY MOUTH EVERY DAY    predniSONE (DELTASONE) 10 MG tablet Take 1 tablet (10 mg total) by mouth once daily. for 10 days    pulse oximeter (PULSE OXIMETER) device by Apply Externally route 2 (two) times a day. Use twice daily at 8 AM and 3 PM and [facility] the [facility] as directed.    sodium bicarbonate 650 MG tablet Take 2 tablets (1,300 mg total) by mouth 3 (three) times daily.    sodium chloride 3% 3 % nebulizer solution TAKE 4 MLS BY NEBULIZATION 4 (FOUR) TIMES DAILY AS NEEDED FOR OTHER OR COUGH (TO INDUCE SPUTUM AND CLEAR MUCOUS.).    tiotropium (SPIRIVA) 18 mcg inhalation capsule Inhale 1 capsule (18 mcg total) into the lungs once daily. Controller    valsartan (DIOVAN) 80 MG tablet Take 1 tablet (80 mg total) by mouth 2 (two) times daily.     Family History       Problem Relation (Age of Onset)    Asthma Mother    Cancer Brother, Son, Daughter    Diabetes Father, Sister, Brother, Brother, Brother, Brother, Son, Daughter, Son    Heart disease Father, Sister, Brother, Brother, Brother    Hypertension Brother, Brother, Mother    Melanoma Daughter    No Known Problems Maternal Grandmother, Maternal Grandfather, Paternal Grandmother, Paternal Grandfather    Obesity Daughter    Stroke Mother          Tobacco Use    Smoking " status: Never Smoker    Smokeless tobacco: Never Used   Substance and Sexual Activity    Alcohol use: No     Alcohol/week: 0.0 standard drinks    Drug use: No    Sexual activity: Yes     Partners: Male     Review of Systems   Constitutional:  Negative for chills, diaphoresis and fever.   HENT:  Negative for hearing loss and sore throat.    Eyes:  Negative for visual disturbance.   Respiratory:  Positive for cough and shortness of breath. Negative for wheezing.    Cardiovascular:  Negative for chest pain, palpitations and leg swelling.   Gastrointestinal:  Negative for abdominal pain, diarrhea, nausea and vomiting.   Genitourinary:  Negative for difficulty urinating and flank pain.   Musculoskeletal:  Negative for back pain.   Skin:  Negative for rash and wound.   Neurological:  Negative for dizziness, weakness and headaches.   Psychiatric/Behavioral:  Negative for agitation and confusion.    Objective:     Vital Signs (Most Recent):  Temp: 98.3 °F (36.8 °C) (06/20/22 1900)  Pulse: 76 (06/20/22 1925)  Resp: 18 (06/20/22 1925)  BP: (!) 177/76 (06/20/22 1900)  SpO2: 98 % (06/20/22 1925)   Vital Signs (24h Range):  Temp:  [98 °F (36.7 °C)-98.3 °F (36.8 °C)] 98.3 °F (36.8 °C)  Pulse:  [76-89] 76  Resp:  [12-25] 18  SpO2:  [92 %-100 %] 98 %  BP: (154-186)/() 177/76     Weight: 59 kg (130 lb)  Body mass index is 23.03 kg/m².    Physical Exam  Vitals and nursing note reviewed.   Constitutional:       General: She is not in acute distress.     Appearance: Normal appearance. She is not ill-appearing.      Interventions: Nasal cannula in place.   HENT:      Head: Normocephalic and atraumatic.      Right Ear: Decreased hearing noted.      Left Ear: Decreased hearing noted.      Mouth/Throat:      Mouth: Mucous membranes are dry.   Eyes:      Extraocular Movements: Extraocular movements intact.      Pupils: Pupils are equal, round, and reactive to light.   Cardiovascular:      Rate and Rhythm: Normal rate and regular  rhythm.      Pulses: Normal pulses.      Heart sounds: Normal heart sounds. No murmur heard.    No friction rub. No gallop.   Pulmonary:      Effort: Pulmonary effort is normal. Tachypnea present. No respiratory distress.      Breath sounds: Examination of the right-middle field reveals rales. Examination of the left-middle field reveals rales. Examination of the right-lower field reveals decreased breath sounds. Examination of the left-lower field reveals decreased breath sounds. Decreased breath sounds and rales present. No wheezing or rhonchi.   Abdominal:      General: Bowel sounds are normal. There is no distension.      Palpations: Abdomen is soft.      Tenderness: There is no abdominal tenderness. There is no guarding or rebound.   Genitourinary:     Comments: De La Cruz catheter to gravity  Musculoskeletal:         General: No swelling.      Cervical back: Normal range of motion and neck supple.      Right lower le+ Edema present.      Left lower le+ Edema present.   Skin:     General: Skin is warm and dry.      Capillary Refill: Capillary refill takes less than 2 seconds.      Findings: No bruising, erythema or rash.   Neurological:      General: No focal deficit present.      Mental Status: She is alert and oriented to person, place, and time.      GCS: GCS eye subscore is 4. GCS verbal subscore is 5. GCS motor subscore is 6.   Psychiatric:         Mood and Affect: Mood normal. Affect is tearful.         Behavior: Behavior normal. Behavior is cooperative.         CRANIAL NERVES     CN III, IV, VI   Pupils are equal, round, and reactive to light.     Significant Labs: All pertinent labs within the past 24 hours have been reviewed.  Recent Results (from the past 24 hour(s))   CBC auto differential    Collection Time: 22  2:25 PM   Result Value Ref Range    WBC 13.35 (H) 3.90 - 12.70 K/uL    RBC 3.16 (L) 4.00 - 5.40 M/uL    Hemoglobin 9.0 (L) 12.0 - 16.0 g/dL    Hematocrit 27.8 (L) 37.0 - 48.5 %     MCV 88 82 - 98 fL    MCH 28.5 27.0 - 31.0 pg    MCHC 32.4 32.0 - 36.0 g/dL    RDW 14.6 (H) 11.5 - 14.5 %    Platelets 260 150 - 450 K/uL    MPV 9.8 9.2 - 12.9 fL    Immature Granulocytes 1.4 (H) 0.0 - 0.5 %    Gran # (ANC) 10.5 (H) 1.8 - 7.7 K/uL    Immature Grans (Abs) 0.19 (H) 0.00 - 0.04 K/uL    Lymph # 1.7 1.0 - 4.8 K/uL    Mono # 0.9 0.3 - 1.0 K/uL    Eos # 0.1 0.0 - 0.5 K/uL    Baso # 0.04 0.00 - 0.20 K/uL    nRBC 0 0 /100 WBC    Gran % 78.7 (H) 38.0 - 73.0 %    Lymph % 12.7 (L) 18.0 - 48.0 %    Mono % 6.4 4.0 - 15.0 %    Eosinophil % 0.5 0.0 - 8.0 %    Basophil % 0.3 0.0 - 1.9 %    Differential Method Automated    Comprehensive metabolic panel    Collection Time: 06/20/22  2:25 PM   Result Value Ref Range    Sodium 134 (L) 136 - 145 mmol/L    Potassium 4.2 3.5 - 5.1 mmol/L    Chloride 95 95 - 110 mmol/L    CO2 29 23 - 29 mmol/L    Glucose 161 (H) 70 - 110 mg/dL    BUN 64 (H) 8 - 23 mg/dL    Creatinine 2.8 (H) 0.5 - 1.4 mg/dL    Calcium 9.0 8.7 - 10.5 mg/dL    Total Protein 7.3 6.0 - 8.4 g/dL    Albumin 2.8 (L) 3.5 - 5.2 g/dL    Total Bilirubin 0.4 0.1 - 1.0 mg/dL    Alkaline Phosphatase 107 55 - 135 U/L    AST 15 10 - 40 U/L    ALT 12 10 - 44 U/L    Anion Gap 10 8 - 16 mmol/L    eGFR if African American 17 (A) >60 mL/min/1.73 m^2    eGFR if non African American 15 (A) >60 mL/min/1.73 m^2   Brain natriuretic peptide    Collection Time: 06/20/22  2:25 PM   Result Value Ref Range     (H) 0 - 99 pg/mL   Troponin I    Collection Time: 06/20/22  2:25 PM   Result Value Ref Range    Troponin I 0.015 0.000 - 0.026 ng/mL   POCT COVID-19 Rapid Screening    Collection Time: 06/20/22  2:32 PM   Result Value Ref Range    POC Rapid COVID Positive (A) Negative     Acceptable Yes    Influenza A & B by Molecular    Collection Time: 06/20/22  4:54 PM    Specimen: Nasopharyngeal Swab   Result Value Ref Range    Influenza A, Molecular Negative Negative    Influenza B, Molecular Negative Negative    Flu A & B  Source Nasal swab    Urinalysis, Reflex to Urine Culture Urine, Clean Catch    Collection Time: 06/20/22  4:54 PM    Specimen: Urine   Result Value Ref Range    Specimen UA Urine, Clean Catch     Color, UA Yellow Yellow, Straw, Stefany    Appearance, UA Hazy (A) Clear    pH, UA 8.0 5.0 - 8.0    Specific Gravity, UA 1.010 1.005 - 1.030    Protein, UA 2+ (A) Negative    Glucose, UA Trace (A) Negative    Ketones, UA Negative Negative    Bilirubin (UA) Negative Negative    Occult Blood UA Negative Negative    Nitrite, UA Negative Negative    Urobilinogen, UA Negative <2.0 EU/dL    Leukocytes, UA 3+ (A) Negative   Urinalysis Microscopic    Collection Time: 06/20/22  4:54 PM   Result Value Ref Range    RBC, UA 3 0 - 4 /hpf    WBC, UA 4 0 - 5 /hpf    Bacteria Rare None-Occ /hpf    Squam Epithel, UA 21 /hpf    Hyaline Casts, UA 0 0-1/lpf /lpf    Microscopic Comment SEE COMMENT    Sedimentation rate    Collection Time: 06/20/22  5:16 PM   Result Value Ref Range    Sed Rate 120 (H) 0 - 20 mm/Hr   CK    Collection Time: 06/20/22  5:16 PM   Result Value Ref Range    CPK 22 20 - 180 U/L   Lactate Dehydrogenase    Collection Time: 06/20/22  5:16 PM   Result Value Ref Range     110 - 260 U/L   Ferritin    Collection Time: 06/20/22  5:16 PM   Result Value Ref Range    Ferritin 111 20.0 - 300.0 ng/mL   D-Dimer, Quantitative    Collection Time: 06/20/22  5:16 PM   Result Value Ref Range    D-Dimer 2.38 (H) <0.50 mg/L FEU   Lactic Acid, Plasma    Collection Time: 06/20/22  5:16 PM   Result Value Ref Range    Lactate (Lactic Acid) 1.0 0.5 - 2.2 mmol/L   Troponin I    Collection Time: 06/20/22  5:16 PM   Result Value Ref Range    Troponin I 0.012 0.000 - 0.026 ng/mL           Significant Imaging: I have reviewed all pertinent imaging results/findings within the past 24 hours.    Assessment/Plan:     * Acute on chronic combined systolic and diastolic CHF (congestive heart failure)  Patient presents with shortness of breath and PELAYO.  Bibasilar rales and peripheral edema on exam; L >R edema  - consistent clinical presentation;   -Troponin 0.015; likely demand; will continue to monitor and trend  -CXR revealed diffuse pulmonary edema with right small pleural effusion  - initiated lasix 40mg IV  in ED, will continue at 40mg IV q12hr  - continue home BB, will hold ARB in setting of VIRGILIO  - Daily weights  -Strict I/Os  - Monitor on telemetry  - Monitor and trend BMP, Mg, and renal function; keep K >4, Mg >2  -Sodium restriction (<2g/d), fluid restriction (<2L)   - 2D echo to assess cardiac function and valvular dysfunction.               Type 2 diabetes mellitus, with long-term current use of insulin  Hemoglobin A1C   Date Value Ref Range Status   02/22/2022 5.9 (H) 4.0 - 5.6 % Final     -Serum glucose on admit 161  - hold home oral medications  - LDSSI with ACCUcheck ACHS  - Diabetic diet  -Hypoglycemia protocol PRN  -Monitor closely and adjust insulin regimen as clinically indicated to achieve levels of 140-180 during hospitalization        Essential hypertension  -Resume home BB and hydralazine; hold ARB in setting of VIRGILIO  -Monitor and trend vital signs q4hr   -Anti-hypertensives PRN for SBP >180      COVID-19 virus infection  - COVID positive 6/20/22; initiated on protocol  - Isolation: Airborne/Droplet. Surgical mask on patient. Notify Infection Control  - CXR with diffuse pulmonary edema with small right pleural effusion, requiring O2  - Monitor on Telemetry  -Received 40mg PO Prednisone x1 in ED   - initiated on remdesivir x5d; daily CMP  - CRP pending; D-dimer 2.38  -Covid Labs pending  - Order RT consult via Respiratory Communication for COVID Protocols  - Incentive Spirometer Q4h to promote lung compliance   - Continuous Pulse Oximetry, goal SpO2 92-96%  - supplemental O2 PRN, will wean as tolerated  - Albuterol INH Q6h scheduled & PRN   - MVI & ascorbic acid 500mg PO BID  -Anti-tussives for cough  - Acetaminophen Q6hr PRN  fever/headache  - Loperamide PRN viral diarrhea  - VTE PPx: enoxaparin 1mg/kg q24h in setting of VIRGILIO  - PT/OT for debility/weakness  - If deterioration, may warrant trial of NIPPV in neg pressure room or immediate ICU consult        Mycobacterium avium complex  See above      ABPA (allergic bronchopulmonary aspergillosis)  Mycobacterium avium complex    -Followed by ID, Dr. Amaya; was placed on Voriconazole therapy but unable to tolerate  -Presents to ED with worsening SOB not improved with recent trial of Augmentin and Prednisone  -Procalcitonin pending  -ID consulted to evaluate and assist in antibiotic administration      Hyperlipidemia associated with type 2 diabetes mellitus  Last LDL was   Lab Results   Component Value Date    LDLCALC 29.8 (L) 04/03/2022      Patient is chronically on statin.will continue for now.   Monitor clinically.          Acute kidney injury superimposed on chronic kidney disease  Estimated Creatinine Clearance: 12.6 mL/min (A) (based on SCr of 2.8 mg/dL (H)).  Baseline Cr: 2.1Baseline BUN:46   Recent Labs   Lab 06/20/22  1425   CO2 29   BUN 64*   CREATININE 2.8*       -Etiology likely 2/2 in setting of ADHF  -Will hold on fluid administration and continue diuresis with IV Lasix  -Monitor strict urine output  - Continue to monitor renal function with daily labs and trend electrolytes  - renally dose medications  - avoid nephrotoxic agents including NSAIDs, aminoglycosides, IV contrast (unless absolutely necessary), gadolinium, fleets and other phosphorous-based laxatives  - monitor events that may lead to decreased renal perfusion (hypovolemia, hypotension, sepsis)  - monitor urine output to assure that no obstruction precipitates worsening in GFR        Chronic obstructive pulmonary disease with acute exacerbation  Clinically-stable.  Afebrile. Elevation of WBC, Productive Cough.    --Received Duo-neb x1 and 40mg PO Prednisone x1 in ED  --Will continue home regimen of maintenance  inhalers  -resume home montelukast and Flonase   --Albuterol INH q6 scheduled in setting of COVID-19 infection  --PRN Oxygen per Nasal Cannula for SpO2 <90%  --Pulse-Ox Monitoring every 4 hours  --Monitor respiratory status closely        Dyspnea  -Progressive dyspnea over the past two weeks  -may be in the setting of acute COPD exacerbation, ADHF, and COVID-19 infection  -Procalcitonin pending  -COVID-19 protocol initiated  -D.Dimer 2.38; may be multifactorial as stated above  -L >R LLE edema; will obtain bilateral LLE US  -Will initiate Lovenox 1mg/Kg daily in setting of VIRGILIO      Biventricular ICD (implantable cardioverter-defibrillator) in place  -Noted        VTE Risk Mitigation (From admission, onward)         Ordered     enoxaparin injection 60 mg  Every 24 hours (non-standard times)         06/20/22 1608     IP VTE HIGH RISK PATIENT  Once         06/20/22 1603     Place sequential compression device  Until discontinued         06/20/22 1603              Dilma Brooke DNP, ACNPC-AG, CCRN  Hospitalist  Department of Hospital Medicine  [facility]   Pager: [phone]

## 2022-06-21 NOTE — CONSULTS
Food & Nutrition  Education    Diet Education: For education on Fluid and Salt Restriction   Time Spent: 5 minutes  Learners: pt did not answer phone      Nutrition Education provided with handouts: Heart Failure Nutrition Therapy, Fluid Restricted Diet, Sodium Free Seasonings      Comments: Pt receiving diabetic cardiac diet with 1500 mL fluid restriction. A1C of 7.9 showing good glycemic control. RD provided handouts with key information highlighted and RD contact number to RN, who reported she would give handouts to the pt. Attempted to call pt x2, with no answer. Phone may be out of reach or pt sleeping. RD will attempt to call again later this week.       All questions and concerns answered. Dietitian's contact information provided.       Follow-Up: 1 x/week     Please Re-consult as needed        Thanks!

## 2022-06-21 NOTE — SUBJECTIVE & OBJECTIVE
Past Medical History:   Diagnosis Date    Acute hypoxemic respiratory failure 12/19/2019    Acute right-sided thoracic back pain 12/09/2019    Allergy     Asthma     Basal cell carcinoma     left forehead    Basal cell carcinoma     left nose    Basal cell carcinoma 05/20/2015    right nose    Basal cell carcinoma 12/22/2015    left lower post neck    Basal cell carcinoma 12/03/2019    left ant scalp     Bilateral renal cysts     Breast cancer     CAD (coronary artery disease)     Cardiomyopathy     Cardiomyopathy, ischemic     Cataract     CHF (congestive heart failure)     CKD (chronic kidney disease) stage 4, GFR 15-29 ml/min     Colon polyp 2011    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, with long-term current use of insulin 02/22/2018    COPD (chronic obstructive pulmonary disease)     COPD exacerbation 04/08/2018    Current mild episode of major depressive disorder without prior episode 01/25/2022    Defibrillator discharge     Diabetes mellitus     Diabetes mellitus type II     Diabetes with neurologic complications     Edema     Goiter     MNG    Hematuria, unspecified     HX: breast cancer     Hyperlipidemia     Hypertension     Hypocalcemia     Hypokalemia     Hyponatremia     Hyponatremia 4/2/2022    Iron deficiency anemia 05/16/2017    Iron deficiency anemia     Left kidney mass     Meningioma     Microalbuminuria due to type 2 diabetes mellitus 01/26/2022    Osteoporosis, postmenopausal     Pneumonia 12/08/2019    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    Proteinuria 01/21/2019    Pseudomonas pneumonia     Skin cancer     s/p excision    Sleep apnea     CPAP    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/2015    mid forehead    Unspecified vitamin D deficiency     UTI (urinary tract infection) 04/02/2022    Ventricular tachycardia     Vitamin B12 deficiency     Vitamin D deficiency  disease        Past Surgical History:   Procedure Laterality Date    BASAL CELL CARCINOMA EXCISION      posterior neck and nose    BREAST BIOPSY      BREAST CYST EXCISION Left     BREAST SURGERY      CARDIAC DEFIBRILLATOR PLACEMENT      x 2    CATARACT EXTRACTION W/  INTRAOCULAR LENS IMPLANT Bilateral     CHOLECYSTECTOMY      COLONOSCOPY N/A 11/5/2019    Procedure: COLONOSCOPY;  Surgeon: Boaz Botello MD;  Location: [facility] ENDO ([address] [place] [address]);  Service: Endoscopy;  Laterality: N/A;  AICD - [employer] - sm    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    FRACTURE SURGERY      left elbow and wrist as a child    HYSTERECTOMY      MASTECTOMY Right     REPLACEMENT OF IMPLANTABLE CARDIOVERTER-DEFIBRILLATOR (ICD) GENERATOR N/A 12/17/2018    Procedure: REPLACEMENT, ICD GENERATOR;  Surgeon: Jan Mckeon MD;  Location: [facility] EP LAB;  Service: Cardiology;  Laterality: N/A;  DONNA, CRT-D gen change, MDT, MAC, SK, 3 Prep    REVISION OF SKIN POCKET FOR CARDIOVERTER-DEFIBRILLATOR  12/17/2018    Procedure: Revision, Skin Pocket, For Cardioverter-Defibrillator;  Surgeon: Jan Mckeon MD;  Location: [facility] EP LAB;  Service: Cardiology;;    SQUAMOUS CELL CARCINOMA EXCISION      remved from forehead    TONSILLECTOMY         Review of patient's allergies indicates:   Allergen Reactions    Iodine and iodide containing products Hives    Nifedipine      weakness       Medications:  Medications Prior to Admission   Medication Sig    benzonatate (TESSALON) 100 MG capsule Take 1 capsule (100 mg total) by mouth 3 (three) times daily as needed for Cough.    carvediloL (COREG) 25 MG tablet TAKE 2 TABLETS (50 MG TOTAL) BY MOUTH 2 (TWO) TIMES DAILY.    cholecalciferol, vitamin D3, (VITAMIN D3) 50 mcg (2,000 unit) Tab Take 1 tablet by mouth once daily.     estradioL (ESTRACE) 0.01 % (0.1 mg/gram) vaginal cream Place 1 g vaginally every Mon, Wed, Fri.    furosemide (LASIX) 40 MG tablet Take 1 tablet (40 mg total) by mouth every 8  (eight) hours as needed (swelling).    hydrALAZINE (APRESOLINE) 100 MG tablet Take 1 tablet (100 mg total) by mouth 2 (two) times daily.    insulin (LANTUS SOLOSTAR U-100 INSULIN) glargine 100 units/mL (3mL) SubQ pen Inject 22 Units into the skin once daily.    linaGLIPtin (TRADJENTA) 5 mg Tab tablet Take 1 tablet (5 mg total) by mouth once daily.    magnesium oxide (MAG-OX) 400 mg tablet Take 1 tablet (400 mg total) by mouth once daily.    pravastatin (PRAVACHOL) 40 MG tablet TAKE 1 TABLET BY MOUTH EVERY DAY    tiotropium (SPIRIVA) 18 mcg inhalation capsule Inhale 1 capsule (18 mcg total) into the lungs once daily. Controller    valsartan (DIOVAN) 80 MG tablet Take 1 tablet (80 mg total) by mouth 2 (two) times daily.    albuterol (PROVENTIL/VENTOLIN HFA) 90 mcg/actuation inhaler INHALE 2 PUFFS INTO THE LUNGS EVERY 6 (SIX) HOURS AS NEEDED FOR WHEEZING. RESCUE    albuterol-ipratropium (DUO-NEB) 2.5 mg-0.5 mg/3 mL nebulizer solution TAKE 3 MLS BY NEBULIZATION EVERY 4 (FOUR) HOURS AS NEEDED FOR WHEEZING.    amoxicillin-clavulanate 875-125mg (AUGMENTIN) 875-125 mg per tablet Take 1 tablet by mouth every 12 (twelve) hours. for 10 days    blood sugar diagnostic (ACCU-CHEK HECTOR) Strp Uses Accu-Check Ehctor meter to test BG 4x/day    fluticasone propionate (FLONASE) 50 mcg/actuation nasal spray 2 sprays (100 mcg total) by Each Nostril route once daily. (Patient taking differently: 2 sprays by Each Nostril route daily as needed.)    fluticasone-salmeterol diskus inhaler 250-50 mcg Inhale 1 puff into the lungs as needed. Controller    furosemide (LASIX) 80 MG tablet TAKE 1 TABLET BY MOUTH EVERY 8 HOURS AS NEEDED FOR SWELLING    lancets (ACCU-CHEK SOFTCLIX LANCETS) Misc Uses Accu-Chek Hector meter to test BG 1x/day    levocetirizine (XYZAL) 5 MG tablet Take 5 mg by mouth as needed.    montelukast (SINGULAIR) 10 mg tablet Take 1 tablet (10 mg total) by mouth every evening. (Patient taking differently: Take 10 mg  "by mouth daily as needed.)    nitroGLYCERIN (NITROSTAT) 0.4 MG SL tablet Place 0.4 mg under the tongue every 5 (five) minutes as needed for Chest pain.     omega-3 fatty acids 500 mg Cap Take 1 capsule by mouth Twice daily.    pen needle, diabetic (BD ULTRA-FINE MINI PEN NEEDLE) 31 gauge x 3/16" Ndle USE WITH LANTUS AT BEDTIME    polyethylene glycol (GLYCOLAX) 17 gram PwPk Take 17 g by mouth daily as needed (constipation).    predniSONE (DELTASONE) 10 MG tablet Take 1 tablet (10 mg total) by mouth once daily. for 10 days    pulse oximeter (PULSE OXIMETER) device by Apply Externally route 2 (two) times a day. Use twice daily at 8 AM and 3 PM and record the value in MyChart as directed.    sodium bicarbonate 650 MG tablet Take 2 tablets (1,300 mg total) by mouth 3 (three) times daily.    sodium chloride 3% 3 % nebulizer solution TAKE 4 MLS BY NEBULIZATION 4 (FOUR) TIMES DAILY AS NEEDED FOR OTHER OR COUGH (TO INDUCE SPUTUM AND CLEAR MUCOUS.).     Antibiotics (From admission, onward)                Start     Stop Route Frequency Ordered    06/21/22 2100  ciprofloxacin HCl tablet 500 mg         06/24 2059 Oral Every 12 hours 06/21/22 1335          Antifungals (From admission, onward)                None          Antivirals (From admission, onward)          Stop Route Frequency     remdesivir 100 mg         06/25 0859 IV Daily             Immunization History   Administered Date(s) Administered    COVID-19, MRNA, LN-S, PF (Pfizer) (Purple Cap) 03/10/2021, 04/06/2021, 11/12/2021    Influenza 10/15/2007, 10/10/2008, 09/29/2009    Influenza (FLUAD) - Quadrivalent - Adjuvanted - PF *Preferred* (65+) 11/23/2021    Influenza - High Dose - PF (65 years and older) 10/21/2010, 10/11/2011, 10/08/2013, 09/25/2014, 10/07/2015, 11/09/2016, 11/16/2017, 11/30/2018, 09/17/2019    Influenza - Quadrivalent - High Dose - PF (65 years and older) 11/05/2020    Influenza Split 10/15/2007, 10/10/2008, 09/29/2009    Pneumococcal " Conjugate - 13 Valent 07/23/2015    Pneumococcal Polysaccharide - 23 Valent 06/05/2013    Td (ADULT) 04/02/2009    Tdap 07/22/2014    Zoster 04/02/2009       Family History       Problem Relation (Age of Onset)    Asthma Mother    Cancer Brother, Son, Daughter    Diabetes Father, Sister, Brother, Brother, Brother, Brother, Son, Daughter, Son    Heart disease Father, Sister, Brother, Brother, Brother    Hypertension Brother, Brother, Mother    Melanoma Daughter    No Known Problems Maternal Grandmother, Maternal Grandfather, Paternal Grandmother, Paternal Grandfather    Obesity Daughter    Stroke Mother          Social History     Socioeconomic History    Marital status:    Occupational History    Occupation: Homemaker     Employer: OTHER   Tobacco Use    Smoking status: Never Smoker    Smokeless tobacco: Never Used   Substance and Sexual Activity    Alcohol use: No     Alcohol/week: 0.0 standard drinks    Drug use: No    Sexual activity: Yes     Partners: Male   Other Topics Concern    Are you pregnant or think you may be? No    Breast-feeding No     Review of Systems   Constitutional:  Negative for chills and fever.   HENT:  Positive for congestion and sinus pressure. Negative for sore throat.    Eyes:  Negative for redness and visual disturbance.   Respiratory:  Positive for cough and shortness of breath. Negative for wheezing.    Cardiovascular:  Positive for leg swelling. Negative for chest pain.   Gastrointestinal:  Negative for constipation, diarrhea, nausea and vomiting.   Endocrine: Negative for polyuria.   Genitourinary:  Negative for dysuria.   Musculoskeletal:  Negative for arthralgias and back pain.   Skin:  Negative for rash and wound.   Allergic/Immunologic: Negative for food allergies.   Neurological:  Negative for headaches.   Hematological:  Negative for adenopathy. Does not bruise/bleed easily.   Psychiatric/Behavioral:  Negative for behavioral problems and confusion.    All  other systems reviewed and are negative.  Objective:     Vital Signs (Most Recent):  Temp: 97.3 °F (36.3 °C) (06/21/22 1444)  Pulse: 73 (06/21/22 1444)  Resp: 18 (06/21/22 1444)  BP: (!) 135/59 (06/21/22 1444)  SpO2: 97 % (06/21/22 1444)   Vital Signs (24h Range):  Temp:  [97 °F (36.1 °C)-99.3 °F (37.4 °C)] 97.3 °F (36.3 °C)  Pulse:  [69-89] 73  Resp:  [16-25] 18  SpO2:  [92 %-99 %] 97 %  BP: (115-186)/() 135/59     Weight: 61.4 kg (135 lb 5.8 oz)  Body mass index is 23.98 kg/m².    Estimated Creatinine Clearance: 13.6 mL/min (A) (based on SCr of 2.6 mg/dL (H)).    Physical Exam  Vitals and nursing note reviewed.   Constitutional:       General: She is not in acute distress.     Appearance: She is normal weight. She is not toxic-appearing.   HENT:      Head: Normocephalic and atraumatic.      Right Ear: External ear normal.      Left Ear: External ear normal.      Nose: Nose normal.      Comments: On 4L nasal cannula     Mouth/Throat:      Mouth: Mucous membranes are moist.   Eyes:      General: No scleral icterus.     Extraocular Movements: Extraocular movements intact.      Conjunctiva/sclera: Conjunctivae normal.      Pupils: Pupils are equal, round, and reactive to light.      Comments: Wearing glasses   Cardiovascular:      Rate and Rhythm: Normal rate and regular rhythm.      Pulses: Normal pulses.      Heart sounds: Normal heart sounds. No murmur heard.  Pulmonary:      Effort: Pulmonary effort is normal. No respiratory distress.      Breath sounds: Rhonchi and rales (bibasilar) present.   Abdominal:      General: Abdomen is flat. Bowel sounds are normal. There is no distension.      Palpations: Abdomen is soft.      Tenderness: There is no abdominal tenderness.   Musculoskeletal:         General: Normal range of motion.      Cervical back: Neck supple.      Left lower leg: Edema present.   Lymphadenopathy:      Cervical: No cervical adenopathy.   Skin:     General: Skin is warm.      Capillary Refill:  Capillary refill takes less than 2 seconds.   Neurological:      General: No focal deficit present.      Mental Status: She is alert and oriented to person, place, and time. Mental status is at baseline.      Cranial Nerves: No cranial nerve deficit.   Psychiatric:         Mood and Affect: Mood normal.         Behavior: Behavior normal.         Thought Content: Thought content normal.       Significant Labs: Blood Culture:   Recent Labs   Lab 04/02/22  1851 04/02/22  1854 04/02/22  2105   LABBLOO No growth after 5 days. No growth after 5 days. No growth after 5 days.     CBC:   Recent Labs   Lab 06/20/22  1425 06/21/22  0233   WBC 13.35* 12.63   HGB 9.0* 8.6*   HCT 27.8* 27.9*    258     CMP:   Recent Labs   Lab 06/20/22  1425 06/21/22  0232   * 135*   K 4.2 4.7   CL 95 96   CO2 29 25   * 270*   BUN 64* 65*   CREATININE 2.8* 2.6*   CALCIUM 9.0 8.6*   PROT 7.3  --    ALBUMIN 2.8*  --    BILITOT 0.4  --    ALKPHOS 107  --    AST 15  --    ALT 12  --    ANIONGAP 10 14   EGFRNONAA 15* 16*     Microbiology Results (last 7 days)       Procedure Component Value Units Date/Time    Urine Culture High Risk [[id number]] Collected: 06/21/22 1511    Order Status: Sent Specimen: Urine, Clean Catch Updated: 06/21/22 1511    Influenza A & B by Molecular [[id number]] Collected: 06/20/22 1654    Order Status: Completed Specimen: Nasopharyngeal Swab Updated: 06/20/22 1806     Influenza A, Molecular Negative     Influenza B, Molecular Negative     Flu A & B Source Nasal swab          Respiratory Culture:   Recent Labs   Lab 06/14/22  1333   GSRESP <10 epithelial cells per low power field.  Few WBC's  Many Gram negative rods  Rare Gram positive cocci   RESPIRATORYC PSEUDOMONAS AERUGINOSA  Many  *     All pertinent labs within the past 24 hours have been reviewed.    Significant Imaging: I have reviewed all pertinent imaging results/findings within the past 24 hours.

## 2022-06-21 NOTE — ASSESSMENT & PLAN NOTE
-Progressive dyspnea over the past two weeks  -may be in the setting of acute COPD exacerbation, ADHF, and COVID-19 infection  -Procalcitonin pending  -COVID-19 protocol initiated  -D.Dimer 2.38; may be multifactorial as stated above  -L >R LLE edema; bilateral LLE US - No DVTnoted  -Will initiate Lovenox 1mg/Kg daily in setting of VIRGILIO

## 2022-06-21 NOTE — PROGRESS NOTES
06/20/22 2039   Admission   Initial VN Admission Questions Complete   Communication Issues? Patient Hearing   Shift   Virtual Nurse - Patient Verbalized Approval Of Camera Use   Safety/Activity   Patient Rounds visualized patient;clutter free environment maintained   Safety Promotion/Fall Prevention instructed to call staff for mobility;side rails raised x 2;Fall Risk reviewed with patient/family   VIRTUAL NURSE: Admission questions completed with patient and daughter (Betsy) at this time. Care plan and safety precautions reviewed. Pt verbalized no complaints, no needs expressed. Instructed to use call light for assistance, they verbalized understanding.

## 2022-06-21 NOTE — CONSULTS
[facility]   Infectious Diseases  Consult Note    Patient Name: Myesha Quinones  MRN: [id number]  Admission Date: 6/20/2022  Hospital Length of Stay: 0 days  Attending Physician: Caroline Anderson MD  Primary Care Provider: Catrachita Rothman MD     Isolation Status: Airborne and Contact and Droplet    Patient information was obtained from patient, past medical records and ER records.      Inpatient consult to Infectious Diseases  Consult performed by: Edin Alvarenga MD  Consult ordered by: Dilma Brooke NP        Assessment/Plan:     COVID-19 virus infection  82 y.o. female with a PMH of NICM/CHF s/p biventricular ICD, CKD4 (baseline Cr about 2.5), T2DM (A1C=7.9 on 6/21/22), history of breast cancer, severe persistent asthma/COPD c/b ABPA following pulmonologist Dr. Maki and ID Dr. Amaya at [facility]; admitted on 6/20/2022 with a week of worsening shortness of breath; found to have COVID-19 positive and concern for heart failure exacerbation. History of ABPA but not tolerating voriconazole (not on itraconazole due to CHF). Sputum culture in 3/2022 with MAC but not met diagnostic criteria for pulmonary MAC infection.     Recommendations:  - Clinical picture is well explained by COVID-19 infection and/or heart failure exacerbation. Continue redemsivir and steroid treatment per COVID-19 treatment protocol, and diuresis.   - Okay to hold off antifungal and defer to outpatient ID. Per IDSA Aspergillosis guideline, oral/inhaled corticosteroid with oral itraconazole (alternative includes voriconazole, posaconazole, inhaled AmB) for symptomatic patients. Patient is up to date with PCV/PPSV, but would need HiB vaccine as well. Avoid fungal exposures (farming, house renovation, composting).   - Given pseudomonas found on recent sputum culture, reasonable to have 5 day course of IV cefepime 1 g q24h (CKD4). Would avoid oral FQ given prolonged QTc and significant cardiac history.     ABPA (allergic  bronchopulmonary aspergillosis)  See above/below        Thank you for your consult. I will sign off. Please contact us if you have any additional questions.    Edin Alvarenga MD  Infectious Diseases  [facility]     Subjective:     Principal Problem: Acute respiratory failure with hypoxia    HPI: Ms Myesha Quinones is an 82 y.o.  female with a PMH of NICM/CHF s/p biventricular ICD, CKD4 (baseline Cr about 2.5), T2DM (A1C=7.9 on 6/21/22), history of breast cancer, severe persistent asthma/COPD c/b ABPA following pulmonologist Dr. Maki and ID Dr. Amaya at [facility]; admitted on 6/20/2022 with a week of worsening shortness of breath. She reported after taking 1 day of voriconazole, she developed generalized malaise so she discontinued voriconazole. Itraconazole not ideal due to CHF. Per chart, patient admitted to ICU in 4/2022 for hyponatremia Na 117 (after starting voriconazole). She followed Dr. Maki on 6/13/22, and was prescribed Augmentin and prednisone to alleviate sinusitis symptoms. Sputum culture on 6/14 grew PSA. Patient had only one sputum culture on 3/9/22 with MAC (so not met diagnosis of MAC disease). At ED, patient was placed on nasal cannula. She was found to have COVID-19 positive. . CXR concerns for pulmonary edema. ID is consulted for antibiotic assistance.            Past Medical History:   Diagnosis Date    Acute hypoxemic respiratory failure 12/19/2019    Acute right-sided thoracic back pain 12/09/2019    Allergy     Asthma     Basal cell carcinoma     left forehead    Basal cell carcinoma     left nose    Basal cell carcinoma 05/20/2015    right nose    Basal cell carcinoma 12/22/2015    left lower post neck    Basal cell carcinoma 12/03/2019    left ant scalp     Bilateral renal cysts     Breast cancer     CAD (coronary artery disease)     Cardiomyopathy     Cardiomyopathy, ischemic     Cataract     CHF (congestive heart failure)     CKD  (chronic kidney disease) stage 4, GFR 15-29 ml/min     Colon polyp 2011    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, with long-term current use of insulin 02/22/2018    COPD (chronic obstructive pulmonary disease)     COPD exacerbation 04/08/2018    Current mild episode of major depressive disorder without prior episode 01/25/2022    Defibrillator discharge     Diabetes mellitus     Diabetes mellitus type II     Diabetes with neurologic complications     Edema     Goiter     MNG    Hematuria, unspecified     HX: breast cancer     Hyperlipidemia     Hypertension     Hypocalcemia     Hypokalemia     Hyponatremia     Hyponatremia 4/2/2022    Iron deficiency anemia 05/16/2017    Iron deficiency anemia     Left kidney mass     Meningioma     Microalbuminuria due to type 2 diabetes mellitus 01/26/2022    Osteoporosis, postmenopausal     Pneumonia 12/08/2019    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    Proteinuria 01/21/2019    Pseudomonas pneumonia     Skin cancer     s/p excision    Sleep apnea     CPAP    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/2015    mid forehead    Unspecified vitamin D deficiency     UTI (urinary tract infection) 04/02/2022    Ventricular tachycardia     Vitamin B12 deficiency     Vitamin D deficiency disease        Past Surgical History:   Procedure Laterality Date    BASAL CELL CARCINOMA EXCISION      posterior neck and nose    BREAST BIOPSY      BREAST CYST EXCISION Left     BREAST SURGERY      CARDIAC DEFIBRILLATOR PLACEMENT      x 2    CATARACT EXTRACTION W/  INTRAOCULAR LENS IMPLANT Bilateral     CHOLECYSTECTOMY      COLONOSCOPY N/A 11/5/2019    Procedure: COLONOSCOPY;  Surgeon: Boaz Botello MD;  Location: [facility] ([address] [place] [address]);  Service: Endoscopy;  Laterality: N/A;  AICD - [employer] -     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    FRACTURE SURGERY      left elbow and  wrist as a child    HYSTERECTOMY      MASTECTOMY Right     REPLACEMENT OF IMPLANTABLE CARDIOVERTER-DEFIBRILLATOR (ICD) GENERATOR N/A 12/17/2018    Procedure: REPLACEMENT, ICD GENERATOR;  Surgeon: Jan Mckeon MD;  Location: [facility];  Service: Cardiology;  Laterality: N/A;  DONNA, CRT-D gen change, MDT, MAC, SK, 3 Prep    REVISION OF SKIN POCKET FOR CARDIOVERTER-DEFIBRILLATOR  12/17/2018    Procedure: Revision, Skin Pocket, For Cardioverter-Defibrillator;  Surgeon: Jan Mckeon MD;  Location: [facility];  Service: Cardiology;;    SQUAMOUS CELL CARCINOMA EXCISION      remved from forehead    TONSILLECTOMY         Review of patient's allergies indicates:   Allergen Reactions    Iodine and iodide containing products Hives    Nifedipine      weakness       Medications:  Medications Prior to Admission   Medication Sig    benzonatate (TESSALON) 100 MG capsule Take 1 capsule (100 mg total) by mouth 3 (three) times daily as needed for Cough.    carvediloL (COREG) 25 MG tablet TAKE 2 TABLETS (50 MG TOTAL) BY MOUTH 2 (TWO) TIMES DAILY.    cholecalciferol, vitamin D3, (VITAMIN D3) 50 mcg (2,000 unit) Tab Take 1 tablet by mouth once daily.     estradioL (ESTRACE) 0.01 % (0.1 mg/gram) vaginal cream Place 1 g vaginally every Mon, Wed, Fri.    furosemide (LASIX) 40 MG tablet Take 1 tablet (40 mg total) by mouth every 8 (eight) hours as needed (swelling).    hydrALAZINE (APRESOLINE) 100 MG tablet Take 1 tablet (100 mg total) by mouth 2 (two) times daily.    insulin (LANTUS SOLOSTAR U-100 INSULIN) glargine 100 units/mL (3mL) SubQ pen Inject 22 Units into the skin once daily.    linaGLIPtin (TRADJENTA) 5 mg Tab tablet Take 1 tablet (5 mg total) by mouth once daily.    magnesium oxide (MAG-OX) 400 mg tablet Take 1 tablet (400 mg total) by mouth once daily.    pravastatin (PRAVACHOL) 40 MG tablet TAKE 1 TABLET BY MOUTH EVERY DAY    tiotropium (SPIRIVA) 18 mcg inhalation capsule Inhale 1 capsule (18 mcg total)  "into the lungs once daily. Controller    valsartan (DIOVAN) 80 MG tablet Take 1 tablet (80 mg total) by mouth 2 (two) times daily.    albuterol (PROVENTIL/VENTOLIN HFA) 90 mcg/actuation inhaler INHALE 2 PUFFS INTO THE LUNGS EVERY 6 (SIX) HOURS AS NEEDED FOR WHEEZING. RESCUE    albuterol-ipratropium (DUO-NEB) 2.5 mg-0.5 mg/3 mL nebulizer solution TAKE 3 MLS BY NEBULIZATION EVERY 4 (FOUR) HOURS AS NEEDED FOR WHEEZING.    amoxicillin-clavulanate 875-125mg (AUGMENTIN) 875-125 mg per tablet Take 1 tablet by mouth every 12 (twelve) hours. for 10 days    blood sugar diagnostic (ACCU-CHEK HECTOR) Strp Uses Accu-Check Hector meter to test BG 4x/day    fluticasone propionate (FLONASE) 50 mcg/actuation nasal spray 2 sprays (100 mcg total) by Each Nostril route once daily. (Patient taking differently: 2 sprays by Each Nostril route daily as needed.)    fluticasone-salmeterol diskus inhaler 250-50 mcg Inhale 1 puff into the lungs as needed. Controller    furosemide (LASIX) 80 MG tablet TAKE 1 TABLET BY MOUTH EVERY 8 HOURS AS NEEDED FOR SWELLING    lancets (ACCU-CHEK SOFTCLIX LANCETS) Misc Uses Accu-Chek Hector meter to test BG 1x/day    levocetirizine (XYZAL) 5 MG tablet Take 5 mg by mouth as needed.    montelukast (SINGULAIR) 10 mg tablet Take 1 tablet (10 mg total) by mouth every evening. (Patient taking differently: Take 10 mg by mouth daily as needed.)    nitroGLYCERIN (NITROSTAT) 0.4 MG SL tablet Place 0.4 mg under the tongue every 5 (five) minutes as needed for Chest pain.     omega-3 fatty acids 500 mg Cap Take 1 capsule by mouth Twice daily.    pen needle, diabetic (BD ULTRA-FINE MINI PEN NEEDLE) 31 gauge x 3/16" Ndle USE WITH LANTUS AT BEDTIME    polyethylene glycol (GLYCOLAX) 17 gram PwPk Take 17 g by mouth daily as needed (constipation).    predniSONE (DELTASONE) 10 MG tablet Take 1 tablet (10 mg total) by mouth once daily. for 10 days    pulse oximeter (PULSE OXIMETER) device by Apply Externally route 2 " (two) times a day. Use twice daily at 8 AM and 3 PM and record the value in MyChart as directed.    sodium bicarbonate 650 MG tablet Take 2 tablets (1,300 mg total) by mouth 3 (three) times daily.    sodium chloride 3% 3 % nebulizer solution TAKE 4 MLS BY NEBULIZATION 4 (FOUR) TIMES DAILY AS NEEDED FOR OTHER OR COUGH (TO INDUCE SPUTUM AND CLEAR MUCOUS.).     Antibiotics (From admission, onward)                Start     Stop Route Frequency Ordered    06/21/22 2100  ciprofloxacin HCl tablet 500 mg         06/24 2059 Oral Every 12 hours 06/21/22 1335          Antifungals (From admission, onward)                None          Antivirals (From admission, onward)          Stop Route Frequency     remdesivir 100 mg         06/25 0859 IV Daily             Immunization History   Administered Date(s) Administered    COVID-19, MRNA, LN-S, PF (Pfizer) (Purple Cap) 03/10/2021, 04/06/2021, 11/12/2021    Influenza 10/15/2007, 10/10/2008, 09/29/2009    Influenza (FLUAD) - Quadrivalent - Adjuvanted - PF *Preferred* (65+) 11/23/2021    Influenza - High Dose - PF (65 years and older) 10/21/2010, 10/11/2011, 10/08/2013, 09/25/2014, 10/07/2015, 11/09/2016, 11/16/2017, 11/30/2018, 09/17/2019    Influenza - Quadrivalent - High Dose - PF (65 years and older) 11/05/2020    Influenza Split 10/15/2007, 10/10/2008, 09/29/2009    Pneumococcal Conjugate - 13 Valent 07/23/2015    Pneumococcal Polysaccharide - 23 Valent 06/05/2013    Td (ADULT) 04/02/2009    Tdap 07/22/2014    Zoster 04/02/2009       Family History       Problem Relation (Age of Onset)    Asthma Mother    Cancer Brother, Son, Daughter    Diabetes Father, Sister, Brother, Brother, Brother, Brother, Son, Daughter, Son    Heart disease Father, Sister, Brother, Brother, Brother    Hypertension Brother, Brother, Mother    Melanoma Daughter    No Known Problems Maternal Grandmother, Maternal Grandfather, Paternal Grandmother, Paternal Grandfather    Obesity Daughter     Stroke Mother          Social History     Socioeconomic History    Marital status:    Occupational History    Occupation: Homemaker     Employer: OTHER   Tobacco Use    Smoking status: Never Smoker    Smokeless tobacco: Never Used   Substance and Sexual Activity    Alcohol use: No     Alcohol/week: 0.0 standard drinks    Drug use: No    Sexual activity: Yes     Partners: Male   Other Topics Concern    Are you pregnant or think you may be? No    Breast-feeding No     Review of Systems   Constitutional:  Negative for chills and fever.   HENT:  Positive for congestion and sinus pressure. Negative for sore throat.    Eyes:  Negative for redness and visual disturbance.   Respiratory:  Positive for cough and shortness of breath. Negative for wheezing.    Cardiovascular:  Positive for leg swelling. Negative for chest pain.   Gastrointestinal:  Negative for constipation, diarrhea, nausea and vomiting.   Endocrine: Negative for polyuria.   Genitourinary:  Negative for dysuria.   Musculoskeletal:  Negative for arthralgias and back pain.   Skin:  Negative for rash and wound.   Allergic/Immunologic: Negative for food allergies.   Neurological:  Negative for headaches.   Hematological:  Negative for adenopathy. Does not bruise/bleed easily.   Psychiatric/Behavioral:  Negative for behavioral problems and confusion.    All other systems reviewed and are negative.  Objective:     Vital Signs (Most Recent):  Temp: 97.3 °F (36.3 °C) (06/21/22 1444)  Pulse: 73 (06/21/22 1444)  Resp: 18 (06/21/22 1444)  BP: (!) 135/59 (06/21/22 1444)  SpO2: 97 % (06/21/22 1444)   Vital Signs (24h Range):  Temp:  [97 °F (36.1 °C)-99.3 °F (37.4 °C)] 97.3 °F (36.3 °C)  Pulse:  [69-89] 73  Resp:  [16-25] 18  SpO2:  [92 %-99 %] 97 %  BP: (115-186)/() 135/59     Weight: 61.4 kg (135 lb 5.8 oz)  Body mass index is 23.98 kg/m².    Estimated Creatinine Clearance: 13.6 mL/min (A) (based on SCr of 2.6 mg/dL (H)).    Physical Exam  Vitals and  nursing note reviewed.   Constitutional:       General: She is not in acute distress.     Appearance: She is normal weight. She is not toxic-appearing.   HENT:      Head: Normocephalic and atraumatic.      Right Ear: External ear normal.      Left Ear: External ear normal.      Nose: Nose normal.      Comments: On 4L nasal cannula     Mouth/Throat:      Mouth: Mucous membranes are moist.   Eyes:      General: No scleral icterus.     Extraocular Movements: Extraocular movements intact.      Conjunctiva/sclera: Conjunctivae normal.      Pupils: Pupils are equal, round, and reactive to light.      Comments: Wearing glasses   Cardiovascular:      Rate and Rhythm: Normal rate and regular rhythm.      Pulses: Normal pulses.      Heart sounds: Normal heart sounds. No murmur heard.  Pulmonary:      Effort: Pulmonary effort is normal. No respiratory distress.      Breath sounds: Rhonchi and rales (bibasilar) present.   Abdominal:      General: Abdomen is flat. Bowel sounds are normal. There is no distension.      Palpations: Abdomen is soft.      Tenderness: There is no abdominal tenderness.   Musculoskeletal:         General: Normal range of motion.      Cervical back: Neck supple.      Left lower leg: Edema present.   Lymphadenopathy:      Cervical: No cervical adenopathy.   Skin:     General: Skin is warm.      Capillary Refill: Capillary refill takes less than 2 seconds.   Neurological:      General: No focal deficit present.      Mental Status: She is alert and oriented to person, place, and time. Mental status is at baseline.      Cranial Nerves: No cranial nerve deficit.   Psychiatric:         Mood and Affect: Mood normal.         Behavior: Behavior normal.         Thought Content: Thought content normal.       Significant Labs: Blood Culture:   Recent Labs   Lab 04/02/22  1851 04/02/22  1854 04/02/22  2105   LABBLOO No growth after 5 days. No growth after 5 days. No growth after 5 days.     CBC:   Recent Labs   Lab  06/20/22  1425 06/21/22  0233   WBC 13.35* 12.63   HGB 9.0* 8.6*   HCT 27.8* 27.9*    258     CMP:   Recent Labs   Lab 06/20/22  1425 06/21/22  0232   * 135*   K 4.2 4.7   CL 95 96   CO2 29 25   * 270*   BUN 64* 65*   CREATININE 2.8* 2.6*   CALCIUM 9.0 8.6*   PROT 7.3  --    ALBUMIN 2.8*  --    BILITOT 0.4  --    ALKPHOS 107  --    AST 15  --    ALT 12  --    ANIONGAP 10 14   EGFRNONAA 15* 16*     Microbiology Results (last 7 days)       Procedure Component Value Units Date/Time    Urine Culture High Risk [[id number]] Collected: 06/21/22 1511    Order Status: Sent Specimen: Urine, Clean Catch Updated: 06/21/22 1511    Influenza A & B by Molecular [[id number]] Collected: 06/20/22 1654    Order Status: Completed Specimen: Nasopharyngeal Swab Updated: 06/20/22 1806     Influenza A, Molecular Negative     Influenza B, Molecular Negative     Flu A & B Source Nasal swab          Respiratory Culture:   Recent Labs   Lab 06/14/22  1333   GSRESP <10 epithelial cells per low power field.  Few WBC's  Many Gram negative rods  Rare Gram positive cocci   RESPIRATORYC PSEUDOMONAS AERUGINOSA  Many  *     All pertinent labs within the past 24 hours have been reviewed.    Significant Imaging: I have reviewed all pertinent imaging results/findings within the past 24 hours.

## 2022-06-21 NOTE — SUBJECTIVE & OBJECTIVE
Interval History: Seen at the bed side. Reports SOB improving. Now on 3L NC.    Review of Systems   Constitutional:  Positive for fatigue. Negative for appetite change, chills and fever.   HENT:  Negative for sore throat and trouble swallowing.    Eyes:  Negative for visual disturbance.   Respiratory:  Positive for cough and shortness of breath. Negative for wheezing.    Cardiovascular:  Positive for leg swelling. Negative for chest pain.   Gastrointestinal:  Negative for abdominal pain, nausea and vomiting.   Genitourinary:  Positive for difficulty urinating. Negative for dysuria and flank pain.   Musculoskeletal:  Negative for back pain.   Skin:  Negative for rash.   Neurological:  Positive for weakness. Negative for dizziness, light-headedness and headaches.   Objective:     Vital Signs (Most Recent):  Temp: 97 °F (36.1 °C) (06/21/22 1045)  Pulse: 72 (06/21/22 1200)  Resp: 18 (06/21/22 1045)  BP: 129/60 (06/21/22 1045)  SpO2: 97 % (06/21/22 1045) Vital Signs (24h Range):  Temp:  [97 °F (36.1 °C)-99.3 °F (37.4 °C)] 97 °F (36.1 °C)  Pulse:  [69-89] 72  Resp:  [12-25] 18  SpO2:  [92 %-100 %] 97 %  BP: (115-186)/() 129/60     Weight: 61.4 kg (135 lb 5.8 oz)  Body mass index is 23.98 kg/m².    Intake/Output Summary (Last 24 hours) at 6/21/2022 1246  Last data filed at 6/21/2022 0800  Gross per 24 hour   Intake 250 ml   Output 2200 ml   Net -1950 ml      Physical Exam  Constitutional:       General: She is not in acute distress.     Appearance: She is not ill-appearing or diaphoretic.   HENT:      Head: Normocephalic and atraumatic.      Nose: No congestion or rhinorrhea.      Mouth/Throat:      Mouth: Mucous membranes are moist.   Eyes:      Conjunctiva/sclera: Conjunctivae normal.   Cardiovascular:      Rate and Rhythm: Normal rate.      Heart sounds: No murmur heard.  Pulmonary:      Effort: No respiratory distress.      Breath sounds: Decreased air movement present. Rales (mild Righ lower lobe) present. No  wheezing or rhonchi.   Abdominal:      General: Bowel sounds are normal.      Palpations: Abdomen is soft.      Tenderness: There is no abdominal tenderness. There is no right CVA tenderness or left CVA tenderness.   Genitourinary:     Comments: [place] in place  Musculoskeletal:      Cervical back: Neck supple.      Right lower leg: Edema present.      Left lower leg: Edema present.   Skin:     General: Skin is warm and dry.   Neurological:      General: No focal deficit present.      Mental Status: She is alert and oriented to person, place, and time.   Psychiatric:         Mood and Affect: Mood normal.       Significant Labs: All pertinent labs within the past 24 hours have been reviewed.    Significant Imaging: I have reviewed all pertinent imaging results/findings within the past 24 hours.

## 2022-06-21 NOTE — HPI
Ms Myesha Quinones is an 82 y.o.  female with a PMH of NICM/CHF s/p biventricular ICD, CKD4 (baseline Cr about 2.5), T2DM (A1C=7.9 on 6/21/22), history of breast cancer, severe persistent asthma/COPD c/b ABPA following pulmonologist Dr. Maki and ID Dr. Amaya at [facility]; admitted on 6/20/2022 with a week of worsening shortness of breath. She reported after taking 1 day of voriconazole, she developed generalized malaise so she discontinued voriconazole. Itraconazole not ideal due to CHF. Per chart, patient admitted to ICU in 4/2022 for hyponatremia Na 117 (after starting voriconazole). She followed Dr. Maki on 6/13/22, and was prescribed Augmentin and prednisone to alleviate sinusitis symptoms. Sputum culture on 6/14 grew PSA. Patient had only one sputum culture on 3/9/22 with MAC (so not met diagnosis of MAC disease). At ED, patient was placed on nasal cannula. She was found to have COVID-19 positive. . CXR concerns for pulmonary edema. ID is consulted for antibiotic assistance.

## 2022-06-21 NOTE — SUBJECTIVE & OBJECTIVE
Past Medical History:   Diagnosis Date    Acute hypoxemic respiratory failure 12/19/2019    Acute right-sided thoracic back pain 12/09/2019    Allergy     Asthma     Basal cell carcinoma     left forehead    Basal cell carcinoma     left nose    Basal cell carcinoma 05/20/2015    right nose    Basal cell carcinoma 12/22/2015    left lower post neck    Basal cell carcinoma 12/03/2019    left ant scalp     Bilateral renal cysts     Breast cancer     CAD (coronary artery disease)     Cardiomyopathy     Cardiomyopathy, ischemic     Cataract     CHF (congestive heart failure)     CKD (chronic kidney disease) stage 4, GFR 15-29 ml/min     Colon polyp 2011    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, with long-term current use of insulin 02/22/2018    COPD (chronic obstructive pulmonary disease)     COPD exacerbation 04/08/2018    Current mild episode of major depressive disorder without prior episode 01/25/2022    Defibrillator discharge     Diabetes mellitus     Diabetes mellitus type II     Diabetes with neurologic complications     Edema     Goiter     MNG    Hematuria, unspecified     HX: breast cancer     Hyperlipidemia     Hypertension     Hypocalcemia     Hypokalemia     Hyponatremia     Hyponatremia 4/2/2022    Iron deficiency anemia 05/16/2017    Iron deficiency anemia     Left kidney mass     Meningioma     Microalbuminuria due to type 2 diabetes mellitus 01/26/2022    Osteoporosis, postmenopausal     Pneumonia 12/08/2019    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    Proteinuria 01/21/2019    Pseudomonas pneumonia     Skin cancer     s/p excision    Sleep apnea     CPAP    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/2015    mid forehead    Unspecified vitamin D deficiency     UTI (urinary tract infection) 04/02/2022    Ventricular tachycardia     Vitamin B12 deficiency     Vitamin D deficiency disease        Past Surgical History:   Procedure  Laterality Date    BASAL CELL CARCINOMA EXCISION      posterior neck and nose    BREAST BIOPSY      BREAST CYST EXCISION Left     BREAST SURGERY      CARDIAC DEFIBRILLATOR PLACEMENT      x 2    CATARACT EXTRACTION W/  INTRAOCULAR LENS IMPLANT Bilateral     CHOLECYSTECTOMY      COLONOSCOPY N/A 11/5/2019    Procedure: COLONOSCOPY;  Surgeon: Boaz Botello MD;  Location: [facility] ENDO ([address] [place] [address]);  Service: Endoscopy;  Laterality: N/A;  AICD - [employer]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    FRACTURE SURGERY      left elbow and wrist as a child    HYSTERECTOMY      MASTECTOMY Right     REPLACEMENT OF IMPLANTABLE CARDIOVERTER-DEFIBRILLATOR (ICD) GENERATOR N/A 12/17/2018    Procedure: REPLACEMENT, ICD GENERATOR;  Surgeon: Jan Mckeon MD;  Location: [facility] EP LAB;  Service: Cardiology;  Laterality: N/A;  DONNA, CRT-D gen change, MDT, MAC, SK, 3 Prep    REVISION OF SKIN POCKET FOR CARDIOVERTER-DEFIBRILLATOR  12/17/2018    Procedure: Revision, Skin Pocket, For Cardioverter-Defibrillator;  Surgeon: Jan Mckeon MD;  Location: [facility] EP LAB;  Service: Cardiology;;    SQUAMOUS CELL CARCINOMA EXCISION      remved from forehead    TONSILLECTOMY         Review of patient's allergies indicates:   Allergen Reactions    Iodine and iodide containing products Hives    Nifedipine      weakness       No current facility-administered medications on file prior to encounter.     Current Outpatient Medications on File Prior to Encounter   Medication Sig    albuterol (PROVENTIL/VENTOLIN HFA) 90 mcg/actuation inhaler INHALE 2 PUFFS INTO THE LUNGS EVERY 6 (SIX) HOURS AS NEEDED FOR WHEEZING. RESCUE    albuterol-ipratropium (DUO-NEB) 2.5 mg-0.5 mg/3 mL nebulizer solution TAKE 3 MLS BY NEBULIZATION EVERY 4 (FOUR) HOURS AS NEEDED FOR WHEEZING.    amoxicillin-clavulanate 875-125mg (AUGMENTIN) 875-125 mg per tablet Take 1 tablet by mouth every 12 (twelve) hours. for 10 days    benzonatate (TESSALON) 100 MG capsule Take 1 capsule (100 mg  total) by mouth 3 (three) times daily as needed for Cough.    blood sugar diagnostic (ACCU-CHEK HECTOR) Strp Uses Accu-Check Hector meter to test BG 4x/day    carvediloL (COREG) 25 MG tablet TAKE 2 TABLETS (50 MG TOTAL) BY MOUTH 2 (TWO) TIMES DAILY.    cholecalciferol, vitamin D3, (VITAMIN D3) 50 mcg (2,000 unit) Tab Take 1 tablet by mouth once daily.     estradioL (ESTRACE) 0.01 % (0.1 mg/gram) vaginal cream Place 1 g vaginally every Mon, Wed, Fri.    fluticasone propionate (FLONASE) 50 mcg/actuation nasal spray 2 sprays (100 mcg total) by Each Nostril route once daily. (Patient taking differently: 2 sprays by Each Nostril route daily as needed.)    fluticasone-salmeterol diskus inhaler 250-50 mcg Inhale 1 puff into the lungs as needed. Controller    furosemide (LASIX) 40 MG tablet Take 1 tablet (40 mg total) by mouth every 8 (eight) hours as needed (swelling).    furosemide (LASIX) 80 MG tablet TAKE 1 TABLET BY MOUTH EVERY 8 HOURS AS NEEDED FOR SWELLING    hydrALAZINE (APRESOLINE) 100 MG tablet Take 1 tablet (100 mg total) by mouth 2 (two) times daily.    insulin (LANTUS SOLOSTAR U-100 INSULIN) glargine 100 units/mL (3mL) SubQ pen Inject 22 Units into the skin once daily.    lancets (ACCU-CHEK SOFTCLIX LANCETS) Misc Uses Accu-Chek Hector meter to test BG 1x/day    levocetirizine (XYZAL) 5 MG tablet Take 5 mg by mouth as needed.    linaGLIPtin (TRADJENTA) 5 mg Tab tablet Take 1 tablet (5 mg total) by mouth once daily.    magnesium oxide (MAG-OX) 400 mg tablet Take 1 tablet (400 mg total) by mouth once daily.    montelukast (SINGULAIR) 10 mg tablet Take 1 tablet (10 mg total) by mouth every evening. (Patient taking differently: Take 10 mg by mouth daily as needed.)    nitroGLYCERIN (NITROSTAT) 0.4 MG SL tablet Place 0.4 mg under the tongue every 5 (five) minutes as needed for Chest pain.     omega-3 fatty acids 500 mg Cap Take 1 capsule by mouth Twice daily.    pen needle, diabetic (BD ULTRA-FINE MINI PEN NEEDLE) 31  "gauge x 3/16" Ndle USE WITH LANTUS AT BEDTIME    polyethylene glycol (GLYCOLAX) 17 gram PwPk Take 17 g by mouth daily as needed (constipation).    pravastatin (PRAVACHOL) 40 MG tablet TAKE 1 TABLET BY MOUTH EVERY DAY    predniSONE (DELTASONE) 10 MG tablet Take 1 tablet (10 mg total) by mouth once daily. for 10 days    pulse oximeter (PULSE OXIMETER) device by Apply Externally route 2 (two) times a day. Use twice daily at 8 AM and 3 PM and [facility].    sodium bicarbonate 650 MG tablet Take 2 tablets (1,300 mg total) by mouth 3 (three) times daily.    sodium chloride 3% 3 % nebulizer solution TAKE 4 MLS BY NEBULIZATION 4 (FOUR) TIMES DAILY AS NEEDED FOR OTHER OR COUGH (TO INDUCE SPUTUM AND CLEAR MUCOUS.).    tiotropium (SPIRIVA) 18 mcg inhalation capsule Inhale 1 capsule (18 mcg total) into the lungs once daily. Controller    valsartan (DIOVAN) 80 MG tablet Take 1 tablet (80 mg total) by mouth 2 (two) times daily.     Family History       Problem Relation (Age of Onset)    Asthma Mother    Cancer Brother, Son, Daughter    Diabetes Father, Sister, Brother, Brother, Brother, Brother, Son, Daughter, Son    Heart disease Father, Sister, Brother, Brother, Brother    Hypertension Brother, Brother, Mother    Melanoma Daughter    No Known Problems Maternal Grandmother, Maternal Grandfather, Paternal Grandmother, Paternal Grandfather    Obesity Daughter    Stroke Mother          Tobacco Use    Smoking status: Never Smoker    Smokeless tobacco: Never Used   Substance and Sexual Activity    Alcohol use: No     Alcohol/week: 0.0 standard drinks    Drug use: No    Sexual activity: Yes     Partners: Male     Review of Systems   Constitutional:  Negative for chills, diaphoresis and fever.   HENT:  Negative for hearing loss and sore throat.    Eyes:  Negative for visual disturbance.   Respiratory:  Positive for cough and shortness of breath. Negative for wheezing.    Cardiovascular:  Negative for chest " pain, palpitations and leg swelling.   Gastrointestinal:  Negative for abdominal pain, diarrhea, nausea and vomiting.   Genitourinary:  Negative for difficulty urinating and flank pain.   Musculoskeletal:  Negative for back pain.   Skin:  Negative for rash and wound.   Neurological:  Negative for dizziness, weakness and headaches.   Psychiatric/Behavioral:  Negative for agitation and confusion.    Objective:     Vital Signs (Most Recent):  Temp: 98.3 °F (36.8 °C) (06/20/22 1900)  Pulse: 76 (06/20/22 1925)  Resp: 18 (06/20/22 1925)  BP: (!) 177/76 (06/20/22 1900)  SpO2: 98 % (06/20/22 1925)   Vital Signs (24h Range):  Temp:  [98 °F (36.7 °C)-98.3 °F (36.8 °C)] 98.3 °F (36.8 °C)  Pulse:  [76-89] 76  Resp:  [12-25] 18  SpO2:  [92 %-100 %] 98 %  BP: (154-186)/() 177/76     Weight: 59 kg (130 lb)  Body mass index is 23.03 kg/m².    Physical Exam  Vitals and nursing note reviewed.   Constitutional:       General: She is not in acute distress.     Appearance: Normal appearance. She is not ill-appearing.      Interventions: Nasal cannula in place.   HENT:      Head: Normocephalic and atraumatic.      Right Ear: Decreased hearing noted.      Left Ear: Decreased hearing noted.      Mouth/Throat:      Mouth: Mucous membranes are dry.   Eyes:      Extraocular Movements: Extraocular movements intact.      Pupils: Pupils are equal, round, and reactive to light.   Cardiovascular:      Rate and Rhythm: Normal rate and regular rhythm.      Pulses: Normal pulses.      Heart sounds: Normal heart sounds. No murmur heard.    No friction rub. No gallop.   Pulmonary:      Effort: Pulmonary effort is normal. Tachypnea present. No respiratory distress.      Breath sounds: Examination of the right-middle field reveals rales. Examination of the left-middle field reveals rales. Examination of the right-lower field reveals decreased breath sounds. Examination of the left-lower field reveals decreased breath sounds. Decreased breath sounds  and rales present. No wheezing or rhonchi.   Abdominal:      General: Bowel sounds are normal. There is no distension.      Palpations: Abdomen is soft.      Tenderness: There is no abdominal tenderness. There is no guarding or rebound.   Genitourinary:     Comments: De La Cruz catheter to gravity  Musculoskeletal:         General: No swelling.      Cervical back: Normal range of motion and neck supple.      Right lower le+ Edema present.      Left lower le+ Edema present.   Skin:     General: Skin is warm and dry.      Capillary Refill: Capillary refill takes less than 2 seconds.      Findings: No bruising, erythema or rash.   Neurological:      General: No focal deficit present.      Mental Status: She is alert and oriented to person, place, and time.      GCS: GCS eye subscore is 4. GCS verbal subscore is 5. GCS motor subscore is 6.   Psychiatric:         Mood and Affect: Mood normal. Affect is tearful.         Behavior: Behavior normal. Behavior is cooperative.         CRANIAL NERVES     CN III, IV, VI   Pupils are equal, round, and reactive to light.     Significant Labs: All pertinent labs within the past 24 hours have been reviewed.  Recent Results (from the past 24 hour(s))   CBC auto differential    Collection Time: 22  2:25 PM   Result Value Ref Range    WBC 13.35 (H) 3.90 - 12.70 K/uL    RBC 3.16 (L) 4.00 - 5.40 M/uL    Hemoglobin 9.0 (L) 12.0 - 16.0 g/dL    Hematocrit 27.8 (L) 37.0 - 48.5 %    MCV 88 82 - 98 fL    MCH 28.5 27.0 - 31.0 pg    MCHC 32.4 32.0 - 36.0 g/dL    RDW 14.6 (H) 11.5 - 14.5 %    Platelets 260 150 - 450 K/uL    MPV 9.8 9.2 - 12.9 fL    Immature Granulocytes 1.4 (H) 0.0 - 0.5 %    Gran # (ANC) 10.5 (H) 1.8 - 7.7 K/uL    Immature Grans (Abs) 0.19 (H) 0.00 - 0.04 K/uL    Lymph # 1.7 1.0 - 4.8 K/uL    Mono # 0.9 0.3 - 1.0 K/uL    Eos # 0.1 0.0 - 0.5 K/uL    Baso # 0.04 0.00 - 0.20 K/uL    nRBC 0 0 /100 WBC    Gran % 78.7 (H) 38.0 - 73.0 %    Lymph % 12.7 (L) 18.0 - 48.0 %    Mono  % 6.4 4.0 - 15.0 %    Eosinophil % 0.5 0.0 - 8.0 %    Basophil % 0.3 0.0 - 1.9 %    Differential Method Automated    Comprehensive metabolic panel    Collection Time: 06/20/22  2:25 PM   Result Value Ref Range    Sodium 134 (L) 136 - 145 mmol/L    Potassium 4.2 3.5 - 5.1 mmol/L    Chloride 95 95 - 110 mmol/L    CO2 29 23 - 29 mmol/L    Glucose 161 (H) 70 - 110 mg/dL    BUN 64 (H) 8 - 23 mg/dL    Creatinine 2.8 (H) 0.5 - 1.4 mg/dL    Calcium 9.0 8.7 - 10.5 mg/dL    Total Protein 7.3 6.0 - 8.4 g/dL    Albumin 2.8 (L) 3.5 - 5.2 g/dL    Total Bilirubin 0.4 0.1 - 1.0 mg/dL    Alkaline Phosphatase 107 55 - 135 U/L    AST 15 10 - 40 U/L    ALT 12 10 - 44 U/L    Anion Gap 10 8 - 16 mmol/L    eGFR if African American 17 (A) >60 mL/min/1.73 m^2    eGFR if non African American 15 (A) >60 mL/min/1.73 m^2   Brain natriuretic peptide    Collection Time: 06/20/22  2:25 PM   Result Value Ref Range     (H) 0 - 99 pg/mL   Troponin I    Collection Time: 06/20/22  2:25 PM   Result Value Ref Range    Troponin I 0.015 0.000 - 0.026 ng/mL   POCT COVID-19 Rapid Screening    Collection Time: 06/20/22  2:32 PM   Result Value Ref Range    POC Rapid COVID Positive (A) Negative     Acceptable Yes    Influenza A & B by Molecular    Collection Time: 06/20/22  4:54 PM    Specimen: Nasopharyngeal Swab   Result Value Ref Range    Influenza A, Molecular Negative Negative    Influenza B, Molecular Negative Negative    Flu A & B Source Nasal swab    Urinalysis, Reflex to Urine Culture Urine, Clean Catch    Collection Time: 06/20/22  4:54 PM    Specimen: Urine   Result Value Ref Range    Specimen UA Urine, Clean Catch     Color, UA Yellow Yellow, Straw, Stefany    Appearance, UA Hazy (A) Clear    pH, UA 8.0 5.0 - 8.0    Specific Gravity, UA 1.010 1.005 - 1.030    Protein, UA 2+ (A) Negative    Glucose, UA Trace (A) Negative    Ketones, UA Negative Negative    Bilirubin (UA) Negative Negative    Occult Blood UA Negative Negative     Nitrite, UA Negative Negative    Urobilinogen, UA Negative <2.0 EU/dL    Leukocytes, UA 3+ (A) Negative   Urinalysis Microscopic    Collection Time: 06/20/22  4:54 PM   Result Value Ref Range    RBC, UA 3 0 - 4 /hpf    WBC, UA 4 0 - 5 /hpf    Bacteria Rare None-Occ /hpf    Squam Epithel, UA 21 /hpf    Hyaline Casts, UA 0 0-1/lpf /lpf    Microscopic Comment SEE COMMENT    Sedimentation rate    Collection Time: 06/20/22  5:16 PM   Result Value Ref Range    Sed Rate 120 (H) 0 - 20 mm/Hr   CK    Collection Time: 06/20/22  5:16 PM   Result Value Ref Range    CPK 22 20 - 180 U/L   Lactate Dehydrogenase    Collection Time: 06/20/22  5:16 PM   Result Value Ref Range     110 - 260 U/L   Ferritin    Collection Time: 06/20/22  5:16 PM   Result Value Ref Range    Ferritin 111 20.0 - 300.0 ng/mL   D-Dimer, Quantitative    Collection Time: 06/20/22  5:16 PM   Result Value Ref Range    D-Dimer 2.38 (H) <0.50 mg/L FEU   Lactic Acid, Plasma    Collection Time: 06/20/22  5:16 PM   Result Value Ref Range    Lactate (Lactic Acid) 1.0 0.5 - 2.2 mmol/L   Troponin I    Collection Time: 06/20/22  5:16 PM   Result Value Ref Range    Troponin I 0.012 0.000 - 0.026 ng/mL           Significant Imaging: I have reviewed all pertinent imaging results/findings within the past 24 hours.

## 2022-06-21 NOTE — ASSESSMENT & PLAN NOTE
Mycobacterium avium complex    -Followed by ID, Dr. Amaya; was placed on Voriconazole therapy but unable to tolerate  -Presents to ED with worsening SOB not improved with recent trial of Augmentin and Prednisone  -Procalcitonin normal  -ID consulted to evaluate and assist in antibiotic administration

## 2022-06-21 NOTE — ASSESSMENT & PLAN NOTE
-Progressive dyspnea over the past two weeks  -may be in the setting of acute COPD exacerbation, ADHF, and COVID-19 infection  -Procalcitonin negative  -COVID-19 protocol initiated  -D.Dimer 2.38; may be multifactorial as stated above  -L >R LLE edema; bilateral LLE US - No DVTnoted  -Initiate Lovenox 1mg/Kg daily in setting of VIRGILIO  - Breathing treatment  - IV lasix  - Supplemental oxygen titrate as needed  - Continuous pulse ox          Patient with Hypoxic Respiratory failure which is Acute.  she is not on home oxygen. Supplemental ventilation was provided and noted-  .   Differential diagnosis includes - CHF, COPD, Pleural effusion and COVID-19 infection, MAC, chronic Aspergillosis Labs and images were reviewed. Patient Has not has a recent ABG. Will treat underlying causes and adjust management of respiratory failure as above    - Breathing treatment  - Covid-19 treatment protocol with Remdesivir and Steroid  - IV lasix  - Supplemental oxygen titrate as needed  - Continuous pulse ox

## 2022-06-21 NOTE — ASSESSMENT & PLAN NOTE
-Progressive dyspnea over the past two weeks  -may be in the setting of acute COPD exacerbation, ADHF, and COVID-19 infection  -Procalcitonin pending  -COVID-19 protocol initiated  -D.Dimer 2.38; may be multifactorial as stated above  -L >R LLE edema; will obtain bilateral LLE US  -Will initiate Lovenox 1mg/Kg daily in setting of VIRGILIO

## 2022-06-21 NOTE — ASSESSMENT & PLAN NOTE
Hx with urinary retention due to prolapse required lauren in the past. Bladder scanned for 1240 on admission  - Lauren placed in ED  - Continue  Lauren

## 2022-06-21 NOTE — PROGRESS NOTES
"[facility] Medicine  Progress Note    Patient Name: Myesha Quinones  MRN: [id number]  Patient Class: OP- Observation   Admission Date: 6/20/2022  Length of Stay: 0 days  Attending Physician: Caroline Anderson MD  Primary Care Provider: Catrachita Rothman MD        Subjective:     Principal Problem:Acute respiratory failure with hypoxia        HPI:  Myesha Quinones has a past medical history of CAD, combined systolic and diastolic heart failure, LBBB, biventricular ICD, CKD4, IDDM, breast cancer, HTN, HLD, severe persistent asthma, COPD, and HLD presents to [facility] ED with complaints of SOB. She states her SOB is associated with a productive cough. She states her SOB has been progressively worsened over the past two weeks. She reports she was seen by her pulmonologist and was prescribed antibiotic and prednisone which she started last Tuesday. She states she did not complete her antibiotics or steroid therapy. She denies fever, chest pain, palpitations, N/V, or diarrhea.     She was seen by her pulmonolgist, Dr. Maki on 6/13/22 for shortness of breath. Reported cough and congestion attributing to sinus infection. She was also noted to have a current MAC infection which she is not on therapy. She was started on 10 day course of Augmentin with 10mg PO prednisone to accompany.     Of Note: She was primarily followed by ID, Dr. Amaya, for ABPA (allergic bronchopulmonary aspergillosis) and was started on Voriconazole therapy in which she did not tolerate.       In the ED: BP (!) 165/107   Pulse 83   Temp 98 °F (36.7 °C) (Axillary)   Resp (!) 22   Ht 5' 3" (1.6 m)   Wt 59 kg (130 lb)   LMP  (LMP Unknown)   SpO2 (!) 94%   BMI 23.03 kg/m² Labs revealed WBC 13.35, H/H 9/27.8, BUN 64, creatinine 2.8, Glucose 161, . Troponin 0.015. 12 lead EKG revealed Normal sinus rhythm, Left axis deviation, Right bundle branch block. She is Covid positive. CXR revealed diffuse pulmonary edema with a right small " pleural effusion. She received Duo-neb tx x1, Lasix 40mg IV x1, and Prednisone 40mg PO x1. ID consulted to follow.         Overview/Hospital Course:  No notes on file    Interval History: Seen at the bed side. Reports SOB improving. Now on 3L NC.    Review of Systems   Constitutional:  Positive for fatigue. Negative for appetite change, chills and fever.   HENT:  Negative for sore throat and trouble swallowing.    Eyes:  Negative for visual disturbance.   Respiratory:  Positive for cough and shortness of breath. Negative for wheezing.    Cardiovascular:  Positive for leg swelling. Negative for chest pain.   Gastrointestinal:  Negative for abdominal pain, nausea and vomiting.   Genitourinary:  Positive for difficulty urinating. Negative for dysuria and flank pain.   Musculoskeletal:  Negative for back pain.   Skin:  Negative for rash.   Neurological:  Positive for weakness. Negative for dizziness, light-headedness and headaches.   Objective:     Vital Signs (Most Recent):  Temp: 97 °F (36.1 °C) (06/21/22 1045)  Pulse: 72 (06/21/22 1200)  Resp: 18 (06/21/22 1045)  BP: 129/60 (06/21/22 1045)  SpO2: 97 % (06/21/22 1045) Vital Signs (24h Range):  Temp:  [97 °F (36.1 °C)-99.3 °F (37.4 °C)] 97 °F (36.1 °C)  Pulse:  [69-89] 72  Resp:  [12-25] 18  SpO2:  [92 %-100 %] 97 %  BP: (115-186)/() 129/60     Weight: 61.4 kg (135 lb 5.8 oz)  Body mass index is 23.98 kg/m².    Intake/Output Summary (Last 24 hours) at 6/21/2022 1246  Last data filed at 6/21/2022 0800  Gross per 24 hour   Intake 250 ml   Output 2200 ml   Net -1950 ml      Physical Exam  Constitutional:       General: She is not in acute distress.     Appearance: She is not ill-appearing or diaphoretic.   HENT:      Head: Normocephalic and atraumatic.      Nose: No congestion or rhinorrhea.      Mouth/Throat:      Mouth: Mucous membranes are moist.   Eyes:      Conjunctiva/sclera: Conjunctivae normal.   Cardiovascular:      Rate and Rhythm: Normal rate.      Heart  sounds: No murmur heard.  Pulmonary:      Effort: No respiratory distress.      Breath sounds: Decreased air movement present. Rales (mild Righ lower lobe) present. No wheezing or rhonchi.   Abdominal:      General: Bowel sounds are normal.      Palpations: Abdomen is soft.      Tenderness: There is no abdominal tenderness. There is no right CVA tenderness or left CVA tenderness.   Genitourinary:     Comments: [place] Cruz in place  Musculoskeletal:      Cervical back: Neck supple.      Right lower leg: Edema present.      Left lower leg: Edema present.   Skin:     General: Skin is warm and dry.   Neurological:      General: No focal deficit present.      Mental Status: She is alert and oriented to person, place, and time.   Psychiatric:         Mood and Affect: Mood normal.       Significant Labs: All pertinent labs within the past 24 hours have been reviewed.    Significant Imaging: I have reviewed all pertinent imaging results/findings within the past 24 hours.      Assessment/Plan:      * Acute respiratory failure with hypoxia  -Progressive dyspnea over the past two weeks  -may be in the setting of acute COPD exacerbation, ADHF, and COVID-19 infection  -Procalcitonin negative  -COVID-19 protocol initiated  -D.Dimer 2.38; may be multifactorial as stated above  -L >R LLE edema; bilateral LLE US - No DVTnoted  -Initiate Lovenox 1mg/Kg daily in setting of VIRGILIO  - Breathing treatment  - IV lasix  - Supplemental oxygen titrate as needed  - Continuous pulse ox          Patient with Hypoxic Respiratory failure which is Acute.  she is not on home oxygen. Supplemental ventilation was provided and noted-  .   Differential diagnosis includes - CHF, COPD, Pleural effusion and COVID-19 infection, MAC, chronic Aspergillosis Labs and images were reviewed. Patient Has not has a recent ABG. Will treat underlying causes and adjust management of respiratory failure as above    - Breathing treatment  - Covid-19 treatment protocol with  Remdesivir and Steroid  - IV lasix  - Supplemental oxygen titrate as needed  - Continuous pulse ox        Acute on chronic combined systolic and diastolic CHF (congestive heart failure)  Patient presents with shortness of breath and PELAYO. Bibasilar rales and peripheral edema on exam; L >R edema  - consistent clinical presentation;   -Troponin 0.015; likely demand; will continue to monitor and trend  -CXR revealed diffuse pulmonary edema with right small pleural effusion  - initiated lasix 40mg IV  in ED, will continue at 40mg IV q12hr  - continue home BB, will hold ARB in setting of VIRGILIO  - Daily weights  -Strict I/Os  - Monitor on telemetry  - Monitor and trend BMP, Mg, and renal function; keep K >4, Mg >2  -Sodium restriction (<2g/d), fluid restriction (<2L)   - 2D echo to assess cardiac function and valvular dysfunction.               COVID-19 virus infection  - COVID positive 6/20/22; initiated on protocol  - Isolation: Airborne/Droplet. Surgical mask on patient. Notify Infection Control  - CXR with diffuse pulmonary edema with small right pleural effusion, requiring O2  - Monitor on Telemetry  -Received 40mg PO Prednisone x1 in ED   - initiated on remdesivir x5d; daily CMP  - CRP pending; D-dimer 2.38  -Covid Labs pending  - Order RT consult via Respiratory Communication for COVID Protocols  - Incentive Spirometer Q4h to promote lung compliance   - Continuous Pulse Oximetry, goal SpO2 92-96%  - supplemental O2 PRN, will wean as tolerated  - Albuterol INH Q6h scheduled & PRN   - MVI & ascorbic acid 500mg PO BID  -Anti-tussives for cough  - Acetaminophen Q6hr PRN fever/headache  - Loperamide PRN viral diarrhea  - VTE PPx: enoxaparin 1mg/kg q24h in setting of VIRGILIO  - PT/OT for debility/weakness  - If deterioration, may warrant trial of NIPPV in neg pressure room or immediate ICU consult  - Add dexamethasone        Chronic obstructive pulmonary disease with acute exacerbation  Clinically-stable.  Afebrile.  Elevation of WBC, Productive Cough.    --Received Duo-neb x1 and 40mg PO Prednisone x1 in ED  --Will continue home regimen of maintenance inhalers  -resume home montelukast and Flonase   --Albuterol INH q6 scheduled in setting of COVID-19 infection  --PRN Oxygen per Nasal Cannula for SpO2 <90%  --Pulse-Ox Monitoring every 4 hours  --Monitor respiratory status closely        Acute kidney injury superimposed on chronic kidney disease  Estimated Creatinine Clearance: 13.6 mL/min (A) (based on SCr of 2.6 mg/dL (H)).  Baseline Cr: 2.1Baseline BUN:46   Recent Labs   Lab 06/20/22  1425 06/21/22  0232   CO2 29 25   BUN 64* 65*   CREATININE 2.8* 2.6*   MG  --  2.2       -Etiology likely 2/2 in setting of ADHF  -Will hold on fluid administration and continue diuresis with IV Lasix  -Monitor strict urine output  - Continue to monitor renal function with daily labs and trend electrolytes  - renally dose medications  - avoid nephrotoxic agents including NSAIDs, aminoglycosides, IV contrast (unless absolutely necessary), gadolinium, fleets and other phosphorous-based laxatives  - monitor events that may lead to decreased renal perfusion (hypovolemia, hypotension, sepsis)  - monitor urine output to assure that no obstruction precipitates worsening in GFR        UTI (urinary tract infection)  Presenting with urinary retention. UA in ED with leukocytes. Urine culture 6/1/22 prior admission grew Acinetobacter Baumannii  > 100,000 cfu/ml  - Start Cipro  - F/u urine culture      Type 2 diabetes mellitus, with long-term current use of insulin  Hemoglobin A1C   Date Value Ref Range Status   02/22/2022 5.9 (H) 4.0 - 5.6 % Final     -Serum glucose on admit 161  - hold home oral medications  - LDSSI with ACCUcheck ACHS  - Diabetic diet  -Hypoglycemia protocol PRN  -Monitor closely and adjust insulin regimen as clinically indicated to achieve levels of 140-180 during hospitalization        Essential hypertension  -Resume home BB and  hydralazine; hold ARB in setting of VIRGILIO  -Monitor and trend vital signs q4hr   -Anti-hypertensives PRN for SBP >180      Mycobacterium avium complex  See above      ABPA (allergic bronchopulmonary aspergillosis)  Mycobacterium avium complex    -Followed by ID, Dr. Amaya; was placed on Voriconazole therapy but unable to tolerate  -Presents to ED with worsening SOB not improved with recent trial of Augmentin and Prednisone  -Procalcitonin normal  -ID consulted to evaluate and assist in antibiotic administration      Urinary retention  Hx with urinary retention due to prolapse required lauren in the past. Bladder scanned for 1240 on admission  - Lauren placed in ED  - Continue  Lauren        Hyperlipidemia associated with type 2 diabetes mellitus  Last LDL was   Lab Results   Component Value Date    LDLCALC 29.8 (L) 04/03/2022      Patient is chronically on statin.will continue for now.   Monitor clinically.          Dyspnea  -Progressive dyspnea over the past two weeks  -may be in the setting of acute COPD exacerbation, ADHF, and COVID-19 infection  -Procalcitonin pending  -COVID-19 protocol initiated  -D.Dimer 2.38; may be multifactorial as stated above  -L >R LLE edema; bilateral LLE US - No DVTnoted  -Will initiate Lovenox 1mg/Kg daily in setting of VIRGILIO      Biventricular ICD (implantable cardioverter-defibrillator) in place  -Noted        VTE Risk Mitigation (From admission, onward)         Ordered     enoxaparin injection 60 mg  Every 24 hours (non-standard times)         06/20/22 1608     IP VTE HIGH RISK PATIENT  Once         06/20/22 1603     Place sequential compression device  Until discontinued         06/20/22 1603                Discharge Planning   ALIX:      Code Status: Full Code   Is the patient medically ready for discharge?:     Reason for patient still in hospital (select all that apply): Patient trending condition, Treatment and Consult recommendations  Discharge Plan A: Home                  Malissa DE SOUZA  SHELBI Gray  Department of [facility] Medicine   [facility]

## 2022-06-21 NOTE — PROGRESS NOTES
Pharmacist Renal Dose Adjustment Note    Myesha Quinones is a 82 y.o. female being treated with the medication ciprofloxacin    Patient Data:    Vital Signs (Most Recent):  Temp: 97 °F (36.1 °C) (06/21/22 1045)  Pulse: 89 (06/21/22 1320)  Resp: 16 (06/21/22 1320)  BP: 129/60 (06/21/22 1045)  SpO2: 99 % (06/21/22 1320) Vital Signs (72h Range):  Temp:  [97 °F (36.1 °C)-99.3 °F (37.4 °C)]   Pulse:  [69-89]   Resp:  [12-25]   BP: (115-186)/()   SpO2:  [92 %-100 %]      Recent Labs   Lab 06/20/22  1425 06/21/22  0232   CREATININE 2.8* 2.6*     Serum creatinine: 2.6 mg/dL (H) 06/21/22 0232  Estimated creatinine clearance: 13.6 mL/min (A)    Medication ciprofloxacin dose: 250 mg frequency q12h will be changed to medication ciprofloxacin dose 500 mg frequency q12h    Pharmacist's Name: Nadeen Oneal  Pharmacist's Extension: [phone]

## 2022-06-22 PROBLEM — E11.00 HYPEROSMOLAR HYPERGLYCEMIC STATE (HHS): Status: ACTIVE | Noted: 2022-06-22

## 2022-06-22 LAB
ANION GAP SERPL CALC-SCNC: 13 MMOL/L (ref 8–16)
ANION GAP SERPL CALC-SCNC: 15 MMOL/L (ref 8–16)
ANION GAP SERPL CALC-SCNC: 16 MMOL/L (ref 8–16)
ANION GAP SERPL CALC-SCNC: 17 MMOL/L (ref 8–16)
ANION GAP SERPL CALC-SCNC: 19 MMOL/L (ref 8–16)
BACTERIA #/AREA URNS HPF: NORMAL /HPF
BASOPHILS # BLD AUTO: 0.01 K/UL (ref 0–0.2)
BASOPHILS # BLD AUTO: 0.01 K/UL (ref 0–0.2)
BASOPHILS NFR BLD: 0.1 % (ref 0–1.9)
BASOPHILS NFR BLD: 0.1 % (ref 0–1.9)
BILIRUB UR QL STRIP: NEGATIVE
BUN SERPL-MCNC: 83 MG/DL (ref 8–23)
BUN SERPL-MCNC: 83 MG/DL (ref 8–23)
BUN SERPL-MCNC: 85 MG/DL (ref 8–23)
BUN SERPL-MCNC: 90 MG/DL (ref 8–23)
BUN SERPL-MCNC: 94 MG/DL (ref 8–23)
CALCIUM SERPL-MCNC: 8.1 MG/DL (ref 8.7–10.5)
CALCIUM SERPL-MCNC: 8.2 MG/DL (ref 8.7–10.5)
CALCIUM SERPL-MCNC: 8.4 MG/DL (ref 8.7–10.5)
CALCIUM SERPL-MCNC: 8.5 MG/DL (ref 8.7–10.5)
CALCIUM SERPL-MCNC: 8.6 MG/DL (ref 8.7–10.5)
CALCIUM SERPL-MCNC: 8.6 MG/DL (ref 8.7–10.5)
CALCIUM SERPL-MCNC: 8.7 MG/DL (ref 8.7–10.5)
CHLORIDE SERPL-SCNC: 89 MMOL/L (ref 95–110)
CHLORIDE SERPL-SCNC: 92 MMOL/L (ref 95–110)
CHLORIDE SERPL-SCNC: 93 MMOL/L (ref 95–110)
CHLORIDE SERPL-SCNC: 94 MMOL/L (ref 95–110)
CHLORIDE SERPL-SCNC: 96 MMOL/L (ref 95–110)
CLARITY UR: CLEAR
CO2 SERPL-SCNC: 19 MMOL/L (ref 23–29)
CO2 SERPL-SCNC: 20 MMOL/L (ref 23–29)
CO2 SERPL-SCNC: 20 MMOL/L (ref 23–29)
CO2 SERPL-SCNC: 21 MMOL/L (ref 23–29)
CO2 SERPL-SCNC: 21 MMOL/L (ref 23–29)
CO2 SERPL-SCNC: 22 MMOL/L (ref 23–29)
CO2 SERPL-SCNC: 25 MMOL/L (ref 23–29)
COLOR UR: YELLOW
CREAT SERPL-MCNC: 2.9 MG/DL (ref 0.5–1.4)
CREAT SERPL-MCNC: 3.1 MG/DL (ref 0.5–1.4)
CREAT SERPL-MCNC: 3.2 MG/DL (ref 0.5–1.4)
CREAT SERPL-MCNC: 3.2 MG/DL (ref 0.5–1.4)
CREAT SERPL-MCNC: 3.4 MG/DL (ref 0.5–1.4)
CREAT SERPL-MCNC: 3.5 MG/DL (ref 0.5–1.4)
CREAT SERPL-MCNC: 3.5 MG/DL (ref 0.5–1.4)
D DIMER PPP IA.FEU-MCNC: 2.44 MG/L FEU
DIFFERENTIAL METHOD: ABNORMAL
DIFFERENTIAL METHOD: ABNORMAL
EOSINOPHIL # BLD AUTO: 0 K/UL (ref 0–0.5)
EOSINOPHIL # BLD AUTO: 0 K/UL (ref 0–0.5)
EOSINOPHIL NFR BLD: 0 % (ref 0–8)
EOSINOPHIL NFR BLD: 0 % (ref 0–8)
ERYTHROCYTE [DISTWIDTH] IN BLOOD BY AUTOMATED COUNT: 14 % (ref 11.5–14.5)
ERYTHROCYTE [DISTWIDTH] IN BLOOD BY AUTOMATED COUNT: 14.3 % (ref 11.5–14.5)
EST. GFR  (AFRICAN AMERICAN): 13 ML/MIN/1.73 M^2
EST. GFR  (AFRICAN AMERICAN): 13 ML/MIN/1.73 M^2
EST. GFR  (AFRICAN AMERICAN): 14 ML/MIN/1.73 M^2
EST. GFR  (AFRICAN AMERICAN): 15 ML/MIN/1.73 M^2
EST. GFR  (AFRICAN AMERICAN): 17 ML/MIN/1.73 M^2
EST. GFR  (NON AFRICAN AMERICAN): 11 ML/MIN/1.73 M^2
EST. GFR  (NON AFRICAN AMERICAN): 11 ML/MIN/1.73 M^2
EST. GFR  (NON AFRICAN AMERICAN): 12 ML/MIN/1.73 M^2
EST. GFR  (NON AFRICAN AMERICAN): 13 ML/MIN/1.73 M^2
EST. GFR  (NON AFRICAN AMERICAN): 14 ML/MIN/1.73 M^2
FERRITIN SERPL-MCNC: 112 NG/ML (ref 20–300)
GLUCOSE SERPL-MCNC: 183 MG/DL (ref 70–110)
GLUCOSE SERPL-MCNC: 237 MG/DL (ref 70–110)
GLUCOSE SERPL-MCNC: 426 MG/DL (ref 70–110)
GLUCOSE SERPL-MCNC: 431 MG/DL (ref 70–110)
GLUCOSE SERPL-MCNC: 468 MG/DL (ref 70–110)
GLUCOSE SERPL-MCNC: 538 MG/DL (ref 70–110)
GLUCOSE SERPL-MCNC: 641 MG/DL (ref 70–110)
GLUCOSE UR QL STRIP: ABNORMAL
HCT VFR BLD AUTO: 24.8 % (ref 37–48.5)
HCT VFR BLD AUTO: 24.8 % (ref 37–48.5)
HGB BLD-MCNC: 7.9 G/DL (ref 12–16)
HGB BLD-MCNC: 7.9 G/DL (ref 12–16)
HGB UR QL STRIP: ABNORMAL
HYALINE CASTS #/AREA URNS LPF: 1 /LPF
IMM GRANULOCYTES # BLD AUTO: 0.13 K/UL (ref 0–0.04)
IMM GRANULOCYTES # BLD AUTO: 0.15 K/UL (ref 0–0.04)
IMM GRANULOCYTES NFR BLD AUTO: 1.1 % (ref 0–0.5)
IMM GRANULOCYTES NFR BLD AUTO: 1.2 % (ref 0–0.5)
KETONES UR QL STRIP: NEGATIVE
LACTATE SERPL-SCNC: 0.9 MMOL/L (ref 0.5–2.2)
LDH SERPL L TO P-CCNC: 186 U/L (ref 110–260)
LEUKOCYTE ESTERASE UR QL STRIP: NEGATIVE
LYMPHOCYTES # BLD AUTO: 0.7 K/UL (ref 1–4.8)
LYMPHOCYTES # BLD AUTO: 1 K/UL (ref 1–4.8)
LYMPHOCYTES NFR BLD: 6.9 % (ref 18–48)
LYMPHOCYTES NFR BLD: 7.3 % (ref 18–48)
MCH RBC QN AUTO: 28 PG (ref 27–31)
MCH RBC QN AUTO: 28.2 PG (ref 27–31)
MCHC RBC AUTO-ENTMCNC: 31.9 G/DL (ref 32–36)
MCHC RBC AUTO-ENTMCNC: 31.9 G/DL (ref 32–36)
MCV RBC AUTO: 88 FL (ref 82–98)
MCV RBC AUTO: 89 FL (ref 82–98)
MICROSCOPIC COMMENT: NORMAL
MONOCYTES # BLD AUTO: 0.4 K/UL (ref 0.3–1)
MONOCYTES # BLD AUTO: 0.8 K/UL (ref 0.3–1)
MONOCYTES NFR BLD: 3.8 % (ref 4–15)
MONOCYTES NFR BLD: 5.7 % (ref 4–15)
NEUTROPHILS # BLD AUTO: 11.6 K/UL (ref 1.8–7.7)
NEUTROPHILS # BLD AUTO: 9.4 K/UL (ref 1.8–7.7)
NEUTROPHILS NFR BLD: 85.8 % (ref 38–73)
NEUTROPHILS NFR BLD: 88 % (ref 38–73)
NITRITE UR QL STRIP: NEGATIVE
NRBC BLD-RTO: 0 /100 WBC
NRBC BLD-RTO: 0 /100 WBC
PH UR STRIP: 6 [PH] (ref 5–8)
PHOSPHATE SERPL-MCNC: 5.9 MG/DL (ref 2.7–4.5)
PLATELET # BLD AUTO: 252 K/UL (ref 150–450)
PLATELET # BLD AUTO: 258 K/UL (ref 150–450)
PMV BLD AUTO: 10.3 FL (ref 9.2–12.9)
PMV BLD AUTO: 9.9 FL (ref 9.2–12.9)
POCT GLUCOSE: 204 MG/DL (ref 70–110)
POCT GLUCOSE: 213 MG/DL (ref 70–110)
POCT GLUCOSE: 225 MG/DL (ref 70–110)
POCT GLUCOSE: 230 MG/DL (ref 70–110)
POCT GLUCOSE: 240 MG/DL (ref 70–110)
POCT GLUCOSE: 293 MG/DL (ref 70–110)
POCT GLUCOSE: 322 MG/DL (ref 70–110)
POCT GLUCOSE: 396 MG/DL (ref 70–110)
POCT GLUCOSE: 443 MG/DL (ref 70–110)
POCT GLUCOSE: 461 MG/DL (ref 70–110)
POCT GLUCOSE: 484 MG/DL (ref 70–110)
POCT GLUCOSE: 496 MG/DL (ref 70–110)
POCT GLUCOSE: >500 MG/DL (ref 70–110)
POTASSIUM SERPL-SCNC: 4.2 MMOL/L (ref 3.5–5.1)
POTASSIUM SERPL-SCNC: 4.3 MMOL/L (ref 3.5–5.1)
POTASSIUM SERPL-SCNC: 4.4 MMOL/L (ref 3.5–5.1)
POTASSIUM SERPL-SCNC: 4.4 MMOL/L (ref 3.5–5.1)
POTASSIUM SERPL-SCNC: 4.5 MMOL/L (ref 3.5–5.1)
PROT UR QL STRIP: ABNORMAL
RBC # BLD AUTO: 2.8 M/UL (ref 4–5.4)
RBC # BLD AUTO: 2.82 M/UL (ref 4–5.4)
RBC #/AREA URNS HPF: 1 /HPF (ref 0–4)
SODIUM SERPL-SCNC: 126 MMOL/L (ref 136–145)
SODIUM SERPL-SCNC: 130 MMOL/L (ref 136–145)
SODIUM SERPL-SCNC: 130 MMOL/L (ref 136–145)
SODIUM SERPL-SCNC: 132 MMOL/L (ref 136–145)
SP GR UR STRIP: 1.01 (ref 1–1.03)
URN SPEC COLLECT METH UR: ABNORMAL
UROBILINOGEN UR STRIP-ACNC: NEGATIVE EU/DL
WBC # BLD AUTO: 10.64 K/UL (ref 3.9–12.7)
WBC # BLD AUTO: 13.45 K/UL (ref 3.9–12.7)
WBC #/AREA URNS HPF: 0 /HPF (ref 0–5)
YEAST URNS QL MICRO: NORMAL

## 2022-06-22 PROCEDURE — 99900035 HC TECH TIME PER 15 MIN (STAT)

## 2022-06-22 PROCEDURE — 63600175 PHARM REV CODE 636 W HCPCS: Mod: TB

## 2022-06-22 PROCEDURE — 80048 BASIC METABOLIC PNL TOTAL CA: CPT | Mod: 91 | Performed by: NURSE PRACTITIONER

## 2022-06-22 PROCEDURE — 80048 BASIC METABOLIC PNL TOTAL CA: CPT | Mod: 91 | Performed by: HOSPITALIST

## 2022-06-22 PROCEDURE — 97530 THERAPEUTIC ACTIVITIES: CPT

## 2022-06-22 PROCEDURE — 27000221 HC OXYGEN, UP TO 24 HOURS

## 2022-06-22 PROCEDURE — 97116 GAIT TRAINING THERAPY: CPT

## 2022-06-22 PROCEDURE — 20000000 HC ICU ROOM

## 2022-06-22 PROCEDURE — 36415 COLL VENOUS BLD VENIPUNCTURE: CPT | Performed by: HOSPITALIST

## 2022-06-22 PROCEDURE — 85025 COMPLETE CBC W/AUTO DIFF WBC: CPT | Mod: 91

## 2022-06-22 PROCEDURE — 94640 AIRWAY INHALATION TREATMENT: CPT | Mod: XB

## 2022-06-22 PROCEDURE — 97165 OT EVAL LOW COMPLEX 30 MIN: CPT

## 2022-06-22 PROCEDURE — 80048 BASIC METABOLIC PNL TOTAL CA: CPT

## 2022-06-22 PROCEDURE — 94640 AIRWAY INHALATION TREATMENT: CPT

## 2022-06-22 PROCEDURE — 25000003 PHARM REV CODE 250

## 2022-06-22 PROCEDURE — C9399 UNCLASSIFIED DRUGS OR BIOLOG: HCPCS | Performed by: NURSE PRACTITIONER

## 2022-06-22 PROCEDURE — 82728 ASSAY OF FERRITIN: CPT

## 2022-06-22 PROCEDURE — 36415 COLL VENOUS BLD VENIPUNCTURE: CPT

## 2022-06-22 PROCEDURE — 25000003 PHARM REV CODE 250: Performed by: NURSE PRACTITIONER

## 2022-06-22 PROCEDURE — 96372 THER/PROPH/DIAG INJ SC/IM: CPT | Performed by: NURSE PRACTITIONER

## 2022-06-22 PROCEDURE — 85025 COMPLETE CBC W/AUTO DIFF WBC: CPT | Performed by: NURSE PRACTITIONER

## 2022-06-22 PROCEDURE — 94660 CPAP INITIATION&MGMT: CPT

## 2022-06-22 PROCEDURE — 97161 PT EVAL LOW COMPLEX 20 MIN: CPT

## 2022-06-22 PROCEDURE — 27000190 HC CPAP FULL FACE MASK W/VALVE

## 2022-06-22 PROCEDURE — 94761 N-INVAS EAR/PLS OXIMETRY MLT: CPT

## 2022-06-22 PROCEDURE — 83615 LACTATE (LD) (LDH) ENZYME: CPT

## 2022-06-22 PROCEDURE — 63600175 PHARM REV CODE 636 W HCPCS: Performed by: NURSE PRACTITIONER

## 2022-06-22 PROCEDURE — 84100 ASSAY OF PHOSPHORUS: CPT | Performed by: HOSPITALIST

## 2022-06-22 PROCEDURE — 25000242 PHARM REV CODE 250 ALT 637 W/ HCPCS

## 2022-06-22 PROCEDURE — 85379 FIBRIN DEGRADATION QUANT: CPT

## 2022-06-22 PROCEDURE — 27000207 HC ISOLATION

## 2022-06-22 RX ORDER — SODIUM CHLORIDE 0.9 % (FLUSH) 0.9 %
10 SYRINGE (ML) INJECTION
Status: DISCONTINUED | OUTPATIENT
Start: 2022-06-22 | End: 2022-07-01 | Stop reason: HOSPADM

## 2022-06-22 RX ORDER — INSULIN ASPART 100 [IU]/ML
4 INJECTION, SOLUTION INTRAVENOUS; SUBCUTANEOUS
Status: DISCONTINUED | OUTPATIENT
Start: 2022-06-22 | End: 2022-06-22

## 2022-06-22 RX ADMIN — INSULIN ASPART 10 UNITS: 100 INJECTION, SOLUTION INTRAVENOUS; SUBCUTANEOUS at 04:06

## 2022-06-22 RX ADMIN — THERA TABS 1 TABLET: TAB at 08:06

## 2022-06-22 RX ADMIN — ALBUTEROL SULFATE 2 PUFF: 90 AEROSOL, METERED RESPIRATORY (INHALATION) at 07:06

## 2022-06-22 RX ADMIN — INSULIN ASPART 4 UNITS: 100 INJECTION, SOLUTION INTRAVENOUS; SUBCUTANEOUS at 04:06

## 2022-06-22 RX ADMIN — SODIUM BICARBONATE 1300 MG: 650 TABLET ORAL at 02:06

## 2022-06-22 RX ADMIN — INSULIN ASPART 4 UNITS: 100 INJECTION, SOLUTION INTRAVENOUS; SUBCUTANEOUS at 11:06

## 2022-06-22 RX ADMIN — OXYCODONE HYDROCHLORIDE AND ACETAMINOPHEN 500 MG: 500 TABLET ORAL at 09:06

## 2022-06-22 RX ADMIN — ALBUTEROL SULFATE 2 PUFF: 90 AEROSOL, METERED RESPIRATORY (INHALATION) at 12:06

## 2022-06-22 RX ADMIN — INSULIN DETEMIR 12 UNITS: 100 INJECTION, SOLUTION SUBCUTANEOUS at 08:06

## 2022-06-22 RX ADMIN — CARVEDILOL 25 MG: 25 TABLET, FILM COATED ORAL at 08:06

## 2022-06-22 RX ADMIN — SODIUM BICARBONATE 1300 MG: 650 TABLET ORAL at 09:06

## 2022-06-22 RX ADMIN — SODIUM CHLORIDE 6 UNITS/HR: 9 INJECTION, SOLUTION INTRAVENOUS at 12:06

## 2022-06-22 RX ADMIN — FLUTICASONE FUROATE AND VILANTEROL TRIFENATATE 1 PUFF: 100; 25 POWDER RESPIRATORY (INHALATION) at 08:06

## 2022-06-22 RX ADMIN — CARVEDILOL 25 MG: 25 TABLET, FILM COATED ORAL at 09:06

## 2022-06-22 RX ADMIN — SODIUM BICARBONATE 1300 MG: 650 TABLET ORAL at 08:06

## 2022-06-22 RX ADMIN — SODIUM CHLORIDE 3 UNITS/HR: 9 INJECTION, SOLUTION INTRAVENOUS at 10:06

## 2022-06-22 RX ADMIN — SODIUM CHLORIDE 2.75 UNITS/HR: 9 INJECTION, SOLUTION INTRAVENOUS at 09:06

## 2022-06-22 RX ADMIN — OXYCODONE HYDROCHLORIDE AND ACETAMINOPHEN 500 MG: 500 TABLET ORAL at 08:06

## 2022-06-22 RX ADMIN — HYDRALAZINE HYDROCHLORIDE 100 MG: 25 TABLET, FILM COATED ORAL at 08:06

## 2022-06-22 RX ADMIN — GUAIFENESIN AND DEXTROMETHORPHAN HYDROBROMIDE 1 TABLET: 600; 30 TABLET, EXTENDED RELEASE ORAL at 09:06

## 2022-06-22 RX ADMIN — SODIUM CHLORIDE 500 ML: 0.9 INJECTION, SOLUTION INTRAVENOUS at 04:06

## 2022-06-22 RX ADMIN — ALBUTEROL SULFATE 2 PUFF: 90 AEROSOL, METERED RESPIRATORY (INHALATION) at 01:06

## 2022-06-22 RX ADMIN — ALBUTEROL SULFATE 2 PUFF: 90 AEROSOL, METERED RESPIRATORY (INHALATION) at 08:06

## 2022-06-22 RX ADMIN — TIOTROPIUM BROMIDE 18 MCG: 18 CAPSULE ORAL; RESPIRATORY (INHALATION) at 08:06

## 2022-06-22 RX ADMIN — HYDRALAZINE HYDROCHLORIDE 100 MG: 25 TABLET, FILM COATED ORAL at 09:06

## 2022-06-22 RX ADMIN — PRAVASTATIN SODIUM 40 MG: 40 TABLET ORAL at 08:06

## 2022-06-22 RX ADMIN — GUAIFENESIN AND DEXTROMETHORPHAN HYDROBROMIDE 1 TABLET: 600; 30 TABLET, EXTENDED RELEASE ORAL at 11:06

## 2022-06-22 RX ADMIN — CEFEPIME 1 G: 1 INJECTION, POWDER, FOR SOLUTION INTRAMUSCULAR; INTRAVENOUS at 10:06

## 2022-06-22 RX ADMIN — REMDESIVIR 100 MG: 100 INJECTION, POWDER, LYOPHILIZED, FOR SOLUTION INTRAVENOUS at 11:06

## 2022-06-22 RX ADMIN — MONTELUKAST 10 MG: 10 TABLET, FILM COATED ORAL at 09:06

## 2022-06-22 RX ADMIN — ENOXAPARIN SODIUM 60 MG: 100 INJECTION SUBCUTANEOUS at 05:06

## 2022-06-22 NOTE — ASSESSMENT & PLAN NOTE
-Progressive dyspnea over the past two weeks  -may be in the setting of acute COPD exacerbation, ADHF, and COVID-19 infection  -Procalcitonin negative  -COVID-19 protocol initiated  -D.Dimer 2.38; may be multifactorial as stated above  -L >R LLE edema; bilateral LLE US - No DVTnoted  -Initiate Lovenox 1mg/Kg daily in setting of VIRGILIO  - Breathing treatment  - IV lasix - hold due to HHS  - Supplemental oxygen titrate as needed  - Continuous pulse ox          Patient with Hypoxic Respiratory failure which is Acute.  she is not on home oxygen. Supplemental ventilation was provided and noted- Oxygen Concentration (%):  [40] 40.   Differential diagnosis includes - CHF, COPD, Pleural effusion and COVID-19 infection, MAC, chronic Aspergillosis Labs and images were reviewed. Patient Has not has a recent ABG. Will treat underlying causes and adjust management of respiratory failure as above    - Breathing treatment  - Covid-19 treatment protocol with Remdesivir and Steroid - Discontinue Steroids due to HHS  - IV lasix - hold  - Supplemental oxygen titrate as needed  - Continuous pulse ox

## 2022-06-22 NOTE — PT/OT/SLP EVAL
Physical Therapy Evaluation and Discharge Note    Patient Name:  Myesha Quinones   MRN:  [id number]    Recommendations:     Discharge Recommendations:  home health OT   Discharge Equipment Recommendations: none   Barriers to discharge: None    Assessment:     Myesha Quinones is a 82 y.o. female admitted with a medical diagnosis of Acute respiratory failure with hypoxia. .  At this time, patient is functioning at their prior level of function and does not require further acute PT services. Upon arrival, pt was elevated HOB on 2l/min O2. Son present w/in room. Pt was indep sup<>sit, mod I sit<>stand w/ RW and amb 70ft w/ RW, mod I w/o desat. Eval/DCPT    Recent Surgery: * No surgery found *      Plan:     During this hospitalization, patient does not require further acute PT services.  Please re-consult if situation changes.      Subjective     Chief Complaint: I feel a lot better from when I first came in.  Patient/Family Comments/goals: Return home  Pain/Comfort:  Pain Rating 1: 0/10    Patients cultural, spiritual, Adventist conflicts given the current situation: no    Living Environment:  Pt lives alone in a 1SH, no steps upon entry.  Prior to admission, patients level of function was Independent.  Equipment used at home: grab bar, cane, quad, shower chair, walker, rolling.  DME owned (not currently used): none.  Upon discharge, patient will have assistance from Family.    Objective:     Communicated with Nikki MEDINA prior to session.  Patient found HOB elevated with pulse ox (continuous), peripheral IV, telemetry, oxygen, lauren catheter, blood pressure cuff upon PT entry to room.    General Precautions: Standard, droplet, contact, airborne   Orthopedic Precautions:N/A   Braces: N/A   Respiratory Status: Nasal cannula, flow 2 L/min    Exams:  · Cognitive Exam:  Patient is oriented to Person, Place, Time and Situation  · Gross Motor Coordination:  WFL  · Postural Exam:  Patient presented with the following  abnormalities:    · -       No postural abnormalities identified  · RLE ROM: WFL  · RLE Strength: WFL  · LLE ROM: WFL  · LLE Strength: WFL    Functional Mobility:  · Bed Mobility:     · Supine to Sit: independence  · Transfers:     · Sit to Stand:  modified independence with rolling walker  · Gait: 70ft w/ RW mod I  · Balance: Good static and Good dynamic    AM-PAC 6 CLICK MOBILITY  Total Score:24       Therapeutic Activities and Exercises:   importance of mob, safety awareness, gait training for increased functional mob and endurance.    AM-PAC 6 CLICK MOBILITY  Total Score:24     Patient left up in chair with all lines intact, call button in reach and RN and son present.    GOALS:   Multidisciplinary Problems     Physical Therapy Goals     Not on file                History:     Past Medical History:   Diagnosis Date    Acute hypoxemic respiratory failure 12/19/2019    Acute right-sided thoracic back pain 12/09/2019    Allergy     Asthma     Basal cell carcinoma     left forehead    Basal cell carcinoma     left nose    Basal cell carcinoma 05/20/2015    right nose    Basal cell carcinoma 12/22/2015    left lower post neck    Basal cell carcinoma 12/03/2019    left ant scalp     Bilateral renal cysts     Breast cancer     CAD (coronary artery disease)     Cardiomyopathy     Cardiomyopathy, ischemic     Cataract     CHF (congestive heart failure)     CKD (chronic kidney disease) stage 4, GFR 15-29 ml/min     Colon polyp 2011    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, with long-term current use of insulin 02/22/2018    COPD (chronic obstructive pulmonary disease)     COPD exacerbation 04/08/2018    Current mild episode of major depressive disorder without prior episode 01/25/2022    Defibrillator discharge     Diabetes mellitus     Diabetes mellitus type II     Diabetes with neurologic complications     Edema      Goiter     MNG    Hematuria, unspecified     HX: breast cancer     Hyperlipidemia     Hypertension     Hypocalcemia     Hypokalemia     Hyponatremia     Hyponatremia 4/2/2022    Iron deficiency anemia 05/16/2017    Iron deficiency anemia     Left kidney mass     Meningioma     Microalbuminuria due to type 2 diabetes mellitus 01/26/2022    Osteoporosis, postmenopausal     Pneumonia 12/08/2019    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    Proteinuria 01/21/2019    Pseudomonas pneumonia     Skin cancer     s/p excision    Sleep apnea     CPAP    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/2015    mid forehead    Unspecified vitamin D deficiency     UTI (urinary tract infection) 04/02/2022    Ventricular tachycardia     Vitamin B12 deficiency     Vitamin D deficiency disease        Past Surgical History:   Procedure Laterality Date    BASAL CELL CARCINOMA EXCISION      posterior neck and nose    BREAST BIOPSY      BREAST CYST EXCISION Left     BREAST SURGERY      CARDIAC DEFIBRILLATOR PLACEMENT      x 2    CATARACT EXTRACTION W/  INTRAOCULAR LENS IMPLANT Bilateral     CHOLECYSTECTOMY      COLONOSCOPY N/A 11/5/2019    Procedure: COLONOSCOPY;  Surgeon: Boaz Botello MD;  [facility]  Service: Endoscopy;  Laterality: N/A;  [facility] [employer] [facility]     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    FRACTURE SURGERY      left elbow and wrist as a child    HYSTERECTOMY      MASTECTOMY Right     REPLACEMENT OF IMPLANTABLE CARDIOVERTER-DEFIBRILLATOR (ICD) GENERATOR N/A 12/17/2018    Procedure: REPLACEMENT, ICD GENERATOR;  Surgeon: Jan Mckeon MD;  Location: [facility] EP LAB;  Service: Cardiology;  Laterality: N/A;  DONNA, CRT-D gen change, MDT, MAC, SK, 3 Prep    REVISION OF SKIN POCKET FOR CARDIOVERTER-DEFIBRILLATOR  12/17/2018    Procedure: Revision, Skin Pocket, For Cardioverter-Defibrillator;  Surgeon: Jan Mckeon MD;  Location: [facility] EP LAB;  Service: Cardiology;;    SQUAMOUS  CELL CARCINOMA EXCISION      remved from forehead    TONSILLECTOMY         Time Tracking:     PT Received On: 06/22/22  PT Start Time: 1415     PT Stop Time: 1440  PT Total Time (min): 25 min     Billable Minutes: Evaluation 10 and Gait Training 15      06/22/2022

## 2022-06-22 NOTE — EICU
"eICU Brief Note    Issue: 82 y old woman with COVID, CHF and now persistent high glucose ? From steroids. AG 17 and started on insulin drip. Fluid 1 liter given    BP (!) 153/69   Pulse 82   Temp 96.2 °F (35.7 °C) (Oral)   Resp 19   Ht 5' 3" (1.6 m)   Wt 61.2 kg (135 lb)   LMP  (LMP Unknown)   SpO2 97%   BMI 23.91 kg/m²   Awake, resting    A/Rec    - ? Hyperglycemia with AG elevation- follow BMP  -Watch for DKA -Betahydroxybutyrate was normal  -monitor for fluid overload; NIV as needed  -May need to hold AM furosemide- NP will review as per online chat   -Insulin infusion   Full dose enoxaparin for renal status  VIRGILIO, UTI  COVID symptomatic care  COPD exacerbation on nebs, steroids  Prior MAC, ABPA    D/w RN and NP Edwardo    "

## 2022-06-22 NOTE — PLAN OF CARE
Notified NP pt blood sugar 500. Gave order to give 7 units now and nightly levemir and recheck in 2 hours.

## 2022-06-22 NOTE — NURSING
2230  Blood Glucose more than 500. 10u asparte, 10u regular insulin administered. BMP drawn, glucose 710. Notified ANGELIC Brooke NP. BS rechecked >500. Pt transferred to ICU with personal belongings. Report given to ADAM Ames. No pt acute distress noted, pt denies discomfort.

## 2022-06-22 NOTE — SIGNIFICANT EVENT
Notified patient's CBG >500. Patient received 22u detemir, 10u asparte, and 10u regular insulin bolus. Repeat CBG >500. Stat BMP revealed Na 127, Co2 22, AG 17, and glucose 710.      Assessment/Plan:    83 y/o female with COPD, CHF, and DMII initially admitted for acute on chronic CHF and covid-19 infection. Since admission, she received 40mg PO Prednisone and was started on Decadron today. CBG >500 with stat BMP glucose 710.      Hemoglobin A1C   Date Value Ref Range Status   06/21/2022 7.9 (H) 4.0 - 5.6 % Final   -Stat BMP concerning for DKA/HHS   -She received detemir, asparte, and regular insulin today  -Will give 1L NS bolus now  -Will start continuous insulin infusion  -BMP q4hr  -UA, lactate and beta-hydroxybutyrate pending  -Transfer to ICU        Critical care time spent on the evaluation and treatment of severe organ dysfunction, review of pertinent labs and imaging studies, discussions with consulting providers and discussions with patient/family 35 minutes          Dilma Brooke DNP, ACNPC-AG, CCRN  Hospitalist  Department of Hospital Medicine  [facility]   Pager: [phone]

## 2022-06-22 NOTE — ASSESSMENT & PLAN NOTE
-Patient is with evidence of HHS given hyperglycemia, elevated anion gap metabolic acidosis, glucosuria,  -Etiology likely 2/2 high dose steroid use  - Received IVF bolus 1L over night  - Received regular insulin bolus and started on weight-based insulin infusion until the anion-gap returns to normal and blood glucose is stabilized.    -Hourly AccuCheks  -check BMPs every 4 hours. Will replace potassium as necessary.    -Will start basal-bolus insulin therapy thereafter as well as an oral diet.

## 2022-06-22 NOTE — ASSESSMENT & PLAN NOTE
"Mycobacterium avium complex    -Followed by ID, Dr. Amaya; was placed on Voriconazole therapy but unable to tolerate  -Presents to ED with worsening SOB not improved with recent trial of Augmentin and Prednisone  -Procalcitonin normal  -ID consulted to evaluate and assist in antibiotic administration. Per ID "Pt can be colonized with pseudomonas- had recurrent PsA in past in sputum- but can consider 5 day couse but not more than 1gm IV daily since Cr Cl was 13.9"  -Start Cefepime 1 g q24h for 5 days    "

## 2022-06-22 NOTE — PROGRESS NOTES
"[employer] [facility]  Progress Note    Patient Name: Myesha Quinones  MRN: [id number]  Patient Class: IP- Inpatient   Admission Date: 6/20/2022  Length of Stay: 0 days  Attending Physician: Caroline Anderson MD  Primary Care Provider: Catrachita Rothman MD        Subjective:     Principal Problem:Acute respiratory failure with hypoxia        HPI:  Myesha Quinones has a past medical history of CAD, combined systolic and diastolic heart failure, LBBB, biventricular ICD, CKD4, IDDM, breast cancer, HTN, HLD, severe persistent asthma, COPD, and HLD presents to [facility] ED with complaints of SOB. She states her SOB is associated with a productive cough. She states her SOB has been progressively worsened over the past two weeks. She reports she was seen by her pulmonologist and was prescribed antibiotic and prednisone which she started last Tuesday. She states she did not complete her antibiotics or steroid therapy. She denies fever, chest pain, palpitations, N/V, or diarrhea.     She was seen by her pulmonolgist, Dr. Maki on 6/13/22 for shortness of breath. Reported cough and congestion attributing to sinus infection. She was also noted to have a current MAC infection which she is not on therapy. She was started on 10 day course of Augmentin with 10mg PO prednisone to accompany.     Of Note: She was primarily followed by ID, Dr. Amaya, for ABPA (allergic bronchopulmonary aspergillosis) and was started on Voriconazole therapy in which she did not tolerate.       In the ED: BP (!) 165/107   Pulse 83   Temp 98 °F (36.7 °C) (Axillary)   Resp (!) 22   Ht 5' 3" (1.6 m)   Wt 59 kg (130 lb)   LMP  (LMP Unknown)   SpO2 (!) 94%   BMI 23.03 kg/m² Labs revealed WBC 13.35, H/H 9/27.8, BUN 64, creatinine 2.8, Glucose 161, . Troponin 0.015. 12 lead EKG revealed Normal sinus rhythm, Left axis deviation, Right bundle branch block. She is Covid positive. CXR revealed diffuse pulmonary edema with a right " small pleural effusion. She received Duo-neb tx x1, Lasix 40mg IV x1, and Prednisone 40mg PO x1. ID consulted to follow.         Overview/Hospital Course:  No notes on file    Interval History: Reports SOB much better. Comfortable on 2L NC. On COVID-19 protocol with Remdesivir. In ICU and on insulin gtt due to elevated BG 600s. Steroids and IV lasix stopped.     Review of Systems   Constitutional:  Negative for appetite change.   HENT:  Positive for congestion.    Respiratory:  Positive for cough and shortness of breath (improving). Negative for wheezing.    Cardiovascular:  Negative for chest pain and leg swelling.   Gastrointestinal:  Negative for nausea.   Skin:  Negative for rash.   Neurological:  Negative for dizziness and headaches.   Psychiatric/Behavioral:  Negative for confusion.    Objective:     Vital Signs (Most Recent):  Temp: 98.2 °F (36.8 °C) (06/22/22 0745)  Pulse: 85 (06/22/22 0915)  Resp: (!) 23 (06/22/22 0915)  BP: (!) 186/76 (06/22/22 0900)  SpO2: 100 % (06/22/22 0915)   Vital Signs (24h Range):  Temp:  [96.2 °F (35.7 °C)-98.2 °F (36.8 °C)] 98.2 °F (36.8 °C)  Pulse:  [69-89] 85  Resp:  [15-27] 23  SpO2:  [97 %-100 %] 100 %  BP: (129-186)/(59-99) 186/76     Weight: 62.5 kg (137 lb 12.6 oz)  Body mass index is 24.41 kg/m².    Intake/Output Summary (Last 24 hours) at 6/22/2022 1039  Last data filed at 6/22/2022 0900  Gross per 24 hour   Intake 1298 ml   Output 2000 ml   Net -702 ml      Physical Exam  Constitutional:       General: She is awake. She is not in acute distress.     Appearance: She is not diaphoretic.   HENT:      Head: Normocephalic and atraumatic.      Nose: No congestion or rhinorrhea.      Mouth/Throat:      Mouth: Mucous membranes are moist.   Eyes:      Conjunctiva/sclera: Conjunctivae normal.   Cardiovascular:      Rate and Rhythm: Normal rate.      Heart sounds: No murmur heard.  Pulmonary:      Effort: Pulmonary effort is normal. No respiratory distress.      Breath sounds:  Decreased air movement (R lung) present. No wheezing or rales.      Comments: 2L NC. Sats %. Congestive cough  Chest:      Chest wall: No tenderness.   Abdominal:      General: Bowel sounds are normal.      Palpations: Abdomen is soft.      Tenderness: There is no abdominal tenderness.   Genitourinary:     Comments: [place] in place  Musculoskeletal:      Cervical back: Neck supple.      Right lower leg: Edema (trace) present.      Left lower leg: Edema (trace) present.   Skin:     General: Skin is warm and dry.   Neurological:      Mental Status: She is alert and oriented to person, place, and time.   Psychiatric:         Mood and Affect: Mood normal.       Significant Labs: All pertinent labs within the past 24 hours have been reviewed.    Significant Imaging: I have reviewed all pertinent imaging results/findings within the past 24 hours.      Assessment/Plan:      * Acute respiratory failure with hypoxia  -Progressive dyspnea over the past two weeks  -may be in the setting of acute COPD exacerbation, ADHF, and COVID-19 infection  -Procalcitonin negative  -COVID-19 protocol initiated  -D.Dimer 2.38; may be multifactorial as stated above  -L >R LLE edema; bilateral LLE US - No DVTnoted  -Initiate Lovenox 1mg/Kg daily in setting of VIRGILIO  - Breathing treatment  - IV lasix - hold due to HHS  - Supplemental oxygen titrate as needed  - Continuous pulse ox          Patient with Hypoxic Respiratory failure which is Acute.  she is not on home oxygen. Supplemental ventilation was provided and noted- Oxygen Concentration (%):  [40] 40.   Differential diagnosis includes - CHF, COPD, Pleural effusion and COVID-19 infection, MAC, chronic Aspergillosis Labs and images were reviewed. Patient Has not has a recent ABG. Will treat underlying causes and adjust management of respiratory failure as above    - Breathing treatment  - Covid-19 treatment protocol with Remdesivir and Steroid - Discontinue Steroids due to HHS  - IV lasix  - hold  - Supplemental oxygen titrate as needed  - Continuous pulse ox        Hyperosmolar hyperglycemic state (HHS)  -Patient is with evidence of HHS given hyperglycemia, elevated anion gap metabolic acidosis, glucosuria,  -Etiology likely 2/2 high dose steroid use  - Received IVF bolus 1L over night  - Received regular insulin bolus and started on weight-based insulin infusion until the anion-gap returns to normal and blood glucose is stabilized.    -Hourly AccuCheks  -check BMPs every 4 hours. Will replace potassium as necessary.    -Will start basal-bolus insulin therapy thereafter as well as an oral diet.        Acute on chronic combined systolic and diastolic CHF (congestive heart failure)  Patient presents with shortness of breath and PELAYO. Bibasilar rales and peripheral edema on exam; L >R edema  - consistent clinical presentation;   -Troponin 0.015; likely demand; will continue to monitor and trend  -CXR revealed diffuse pulmonary edema with right small pleural effusion  - initiated lasix 40mg IV  in ED, will continue at 40mg IV q12hr. Hold lasix for now due to elevated BG with HHS  - continue home BB, will hold ARB in setting of VIRGILIO  - Daily weights  -Strict I/Os  - Monitor on telemetry  - Monitor and trend BMP, Mg, and renal function; keep K >4, Mg >2  -Sodium restriction (<2g/d), fluid restriction (<2L)   - 2D echo to assess cardiac function and valvular dysfunction.               COVID-19 virus infection  - COVID positive 6/20/22; initiated on protocol  - Isolation: Airborne/Droplet. Surgical mask on patient. Notify Infection Control  - CXR with diffuse pulmonary edema with small right pleural effusion, requiring O2  - Monitor on Telemetry  -Received 40mg PO Prednisone x1 in ED   - initiated on remdesivir x5d; daily CMP  - CRP pending; D-dimer 2.38  -Covid Labs pending  - Order RT consult via Respiratory Communication for COVID Protocols  - Incentive Spirometer Q4h to promote lung compliance   -  Continuous Pulse Oximetry, goal SpO2 92-96%  - supplemental O2 PRN, will wean as tolerated  - Albuterol INH Q6h scheduled & PRN   - MVI & ascorbic acid 500mg PO BID  -Anti-tussives for cough  - Acetaminophen Q6hr PRN fever/headache  - Loperamide PRN viral diarrhea  - VTE PPx: enoxaparin 1mg/kg q24h in setting of VIRGILIO  - PT/OT for debility/weakness  - If deterioration, may warrant trial of NIPPV in neg pressure room or immediate ICU consult  - Add dexamethasone - Stopped due to elevated BG with HHS and respiratory-wise clinically improving        Chronic obstructive pulmonary disease with acute exacerbation  Clinically-stable.  Afebrile. Elevation of WBC, Productive Cough.    --Received Duo-neb x1 and 40mg PO Prednisone x1 in ED  --Will continue home regimen of maintenance inhalers  -resume home montelukast and Flonase   --Albuterol INH q6 scheduled in setting of COVID-19 infection  --PRN Oxygen per Nasal Cannula for SpO2 <90%  --Pulse-Ox Monitoring every 4 hours  --Monitor respiratory status closely        Acute kidney injury superimposed on chronic kidney disease  Estimated Creatinine Clearance: 11.4 mL/min (A) (based on SCr of 3.1 mg/dL (H)).  Baseline Cr: 2.1Baseline BUN:46   Recent Labs   Lab 06/21/22  0232 06/21/22  2042 06/21/22  2347 06/22/22  0353 06/22/22  0818   CO2 25   < > 20* 19*  21* 20*   BUN 65*   < > 85* 85*  83* 83*   CREATININE 2.6*   < > 3.5* 3.4*  3.5* 3.1*   MG 2.2  --   --   --   --    PHOS  --   --   --   --  5.9*    < > = values in this interval not displayed.       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 Will hold diuresis with IV Lasix  -Monitor strict urine output  - Continue to monitor renal function with daily labs and trend electrolytes  - renally dose medications  - avoid nephrotoxic agents including NSAIDs, aminoglycosides, IV contrast (unless absolutely necessary), gadolinium, fleets and other phosphorous-based laxatives  -  "monitor events that may lead to decreased renal perfusion (hypovolemia, hypotension, sepsis)  - monitor urine output to assure that no obstruction precipitates worsening in GFR        UTI (urinary tract infection)  Presenting with urinary retention. UA in ED with leukocytes. Urine culture 6/1/22 prior admission grew Acinetobacter Baumannii  > 100,000 cfu/ml  - Off Cipro due to prolong QTc. Now on Cefepime for pneumonia  - F/u urine culture      Type 2 diabetes mellitus, with long-term current use of insulin  Hemoglobin A1C   Date Value Ref Range Status   02/22/2022 5.9 (H) 4.0 - 5.6 % Final     -Serum glucose on admit 161  - hold home oral medications  - LDSSI with ACCUcheck ACHS  - Diabetic diet  -Hypoglycemia protocol PRN  -Monitor closely and adjust insulin regimen as clinically indicated to achieve levels of 140-180 during hospitalization        Essential hypertension  -Resume home BB and hydralazine; hold ARB in setting of VIRGILIO  -Monitor and trend vital signs q4hr   -Anti-hypertensives PRN for SBP >180      Mycobacterium avium complex  See above      ABPA (allergic bronchopulmonary aspergillosis)  Mycobacterium avium complex    -Followed by ID, Dr. Amaya; was placed on Voriconazole therapy but unable to tolerate  -Presents to ED with worsening SOB not improved with recent trial of Augmentin and Prednisone  -Procalcitonin normal  -ID consulted to evaluate and assist in antibiotic administration. Per ID "Pt can be colonized with pseudomonas- had recurrent PsA in past in sputum- but can consider 5 day couse but not more than 1gm IV daily since Cr Cl was 13.9"  -Start Cefepime 1 g q24h for 5 days      Urinary retention  Hx with urinary retention due to prolapse required lauren in the past. Bladder scanned for 1240 on admission  - Lauren placed in ED  - Continue  Lauren        Hyperlipidemia associated with type 2 diabetes mellitus  Last LDL was   Lab Results   Component Value Date    LDLCALC 29.8 (L) 04/03/2022    "   Patient is chronically on statin.will continue for now.   Monitor clinically.          Dyspnea  -Progressive dyspnea over the past two weeks  -may be in the setting of acute COPD exacerbation, ADHF, and COVID-19 infection  -Procalcitonin pending  -COVID-19 protocol initiated  -D.Dimer 2.38; may be multifactorial as stated above  -L >R LLE edema; bilateral LLE US - No DVTnoted  -Will initiate Lovenox 1mg/Kg daily in setting of VIRGILIO      Biventricular ICD (implantable cardioverter-defibrillator) in place  -Noted        VTE Risk Mitigation (From admission, onward)         Ordered     enoxaparin injection 60 mg  Every 24 hours (non-standard times)         06/20/22 1608     IP VTE HIGH RISK PATIENT  Once         06/20/22 1603     Place sequential compression device  Until discontinued         06/20/22 1603                Discharge Planning   ALIX: 6/22/2022     Code Status: Full Code   Is the patient medically ready for discharge?:     Reason for patient still in hospital (select all that apply): Patient trending condition and Treatment  Discharge Plan A: Home            Critical care time spent on the evaluation and treatment of severe organ dysfunction, review of pertinent labs and imaging studies, discussions with consulting providers and discussions with patient/family: 20 minutes.      Malissa Gray NP  Department of Hospital Medicine   [facility] - Intensive Care

## 2022-06-22 NOTE — PT/OT/SLP EVAL
Occupational Therapy   Evaluation    Name: Myesha Quinones  MRN: [id number]  Admitting Diagnosis:  Acute respiratory failure with hypoxia  Recent Surgery: * No surgery found *      Recommendations:     Discharge Recommendations: home health OT, home health PT  Discharge Equipment Recommendations:  none  Barriers to discharge:  None    Assessment:     Myesha Quinones is a 82 y.o. female with a medical diagnosis of Acute respiratory failure with hypoxia.  She presents with performance deficits affecting function: weakness, impaired endurance, impaired self care skills, impaired functional mobilty, decreased lower extremity function, decreased upper extremity function, impaired cardiopulmonary response to activity.      Rehab Prognosis: Good; patient would benefit from acute skilled OT services to address these deficits and reach maximum level of function.       Plan:     Patient to be seen 3 x/week to address the above listed problems via self-care/home management, therapeutic activities, therapeutic exercises  · Plan of Care Expires: 07/22/22  · Plan of Care Reviewed with: patient    Subjective     Chief Complaint: fatigue  Patient/Family Comments/goals: return to OF    Occupational Profile:  Living Environment: Lives alone in [facility], no TAMEKA, WIS drives  Previous level of function: indep-mod I all needs  Roles and Routines: drives, performs IADLs, has assist for cleaning and shopping  Equipment Used at Home:  cane, straight, shower chair, walker, rolling, grab bar  Assistance upon Discharge: family    Pain/Comfort:  · Pain Rating 1: 0/10  · Pain Rating Post-Intervention 1: 0/10    Patients cultural, spiritual, Worship conflicts given the current situation: no    Objective:     Communicated with: nurse prior to session.  Patient found up in chair with blood pressure cuff, peripheral IV, telemetry, pulse ox (continuous), oxygen, lauren catheter upon OT entry to room.    General Precautions: Standard, fall,  respiratory   Orthopedic Precautions:    Braces:    Respiratory Status: Nasal cannula, flow 1 L/min    Occupational Performance:    Bed Mobility:    · NT    Functional Mobility/Transfers:  · Patient completed Sit <> Stand Transfer with contact guard assistance  with  hand-held assist   · Functional Mobility: marching in place HHA CGA    Activities of Daily Living:  · NT    Cognitive/Visual Perceptual:  AO4    Physical Exam:  BUE AROM/strength grossly WFL  Good sit and fair+ stand balance  Fair+ activity tolerance    AMPAC 6 Click ADL:  AMPAC Total Score: 19    Treatment & Education:  Pt educated on role of OT/POC, AROM ex, introduced to energy conservation/work simplification techniques. Performed static standing ~5 minutes and pt able to maintain conversation without SOB  Education:    Patient left up in chair with all lines intact, call button in reach and nurse notified    GOALS:   Multidisciplinary Problems     Occupational Therapy Goals        Problem: Occupational Therapy    Goal Priority Disciplines Outcome Interventions   Occupational Therapy Goal     OT, PT/OT Ongoing, Progressing    Description: Goals to be met by: 7/22     Patient will increase functional independence with ADLs by performing:    UE Dressing with Modified Sherman.  LE Dressing with Modified [place].  Grooming while standing at sink with Modified [place].  Toileting from toilet with Modified Sherman for hygiene and clothing management.   Toilet transfer to toilet with Modified [place].  Upper extremity exercise program x10 reps per handout, with independence.                     History:     Past Medical History:   Diagnosis Date    Acute hypoxemic respiratory failure 12/19/2019    Acute right-sided thoracic back pain 12/09/2019    Allergy     Asthma     Basal cell carcinoma     left forehead    Basal cell carcinoma     left nose    Basal cell carcinoma 05/20/2015    right nose    Basal cell carcinoma  12/22/2015    left lower post neck    Basal cell carcinoma 12/03/2019    left ant scalp     Bilateral renal cysts     Breast cancer     CAD (coronary artery disease)     Cardiomyopathy     Cardiomyopathy, ischemic     Cataract     CHF (congestive heart failure)     CKD (chronic kidney disease) stage 4, GFR 15-29 ml/min     Colon polyp 2011    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, with long-term current use of insulin 02/22/2018    COPD (chronic obstructive pulmonary disease)     COPD exacerbation 04/08/2018    Current mild episode of major depressive disorder without prior episode 01/25/2022    Defibrillator discharge     Diabetes mellitus     Diabetes mellitus type II     Diabetes with neurologic complications     Edema     Goiter     MNG    Hematuria, unspecified     HX: breast cancer     Hyperlipidemia     Hypertension     Hypocalcemia     Hypokalemia     Hyponatremia     Hyponatremia 4/2/2022    Iron deficiency anemia 05/16/2017    Iron deficiency anemia     Left kidney mass     Meningioma     Microalbuminuria due to type 2 diabetes mellitus 01/26/2022    Osteoporosis, postmenopausal     Pneumonia 12/08/2019    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    Proteinuria 01/21/2019    Pseudomonas pneumonia     Skin cancer     s/p excision    Sleep apnea     CPAP    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/2015    mid forehead    Unspecified vitamin D deficiency     UTI (urinary tract infection) 04/02/2022    Ventricular tachycardia     Vitamin B12 deficiency     Vitamin D deficiency disease        Past Surgical History:   Procedure Laterality Date    BASAL CELL CARCINOMA EXCISION      posterior neck and nose    BREAST BIOPSY      BREAST CYST EXCISION Left     BREAST SURGERY      CARDIAC DEFIBRILLATOR PLACEMENT      x 2    CATARACT EXTRACTION W/  INTRAOCULAR LENS IMPLANT Bilateral      CHOLECYSTECTOMY      COLONOSCOPY N/A 11/5/2019    Procedure: COLONOSCOPY;  Surgeon: Boaz Botello MD;  Location: [facility] ENDO ([address] [place] [address]);  Service: Endoscopy;  Laterality: N/A;  AICD - [employer] - sm    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    FRACTURE SURGERY      left elbow and wrist as a child    HYSTERECTOMY      MASTECTOMY Right     REPLACEMENT OF IMPLANTABLE CARDIOVERTER-DEFIBRILLATOR (ICD) GENERATOR N/A 12/17/2018    Procedure: REPLACEMENT, ICD GENERATOR;  Surgeon: Jan Mckeon MD;  Location: [facility] EP LAB;  Service: Cardiology;  Laterality: N/A;  DONNA, CRT-D gen change, MDT, MAC, SK, 3 Prep    REVISION OF SKIN POCKET FOR CARDIOVERTER-DEFIBRILLATOR  12/17/2018    Procedure: Revision, Skin Pocket, For Cardioverter-Defibrillator;  Surgeon: Jan Mckeon MD;  Location: [facility] EP LAB;  Service: Cardiology;;    SQUAMOUS CELL CARCINOMA EXCISION      remved from forehead    TONSILLECTOMY         Time Tracking:     OT Date of Treatment: 06/22/22  OT Start Time: 1510  OT Stop Time: 1540  OT Total Time (min): 30 min    Billable Minutes:Evaluation 15  Therapeutic Activity 15    6/22/2022

## 2022-06-22 NOTE — ASSESSMENT & PLAN NOTE
Presenting with urinary retention. UA in ED with leukocytes. Urine culture 6/1/22 prior admission grew Acinetobacter Baumannii  > 100,000 cfu/ml  - Off Cipro due to prolong QTc. Now on Cefepime for pneumonia  - F/u urine culture

## 2022-06-22 NOTE — ASSESSMENT & PLAN NOTE
- COVID positive 6/20/22; initiated on protocol  - Isolation: Airborne/Droplet. Surgical mask on patient. Notify Infection Control  - CXR with diffuse pulmonary edema with small right pleural effusion, requiring O2  - Monitor on Telemetry  -Received 40mg PO Prednisone x1 in ED   - initiated on remdesivir x5d; daily CMP  - CRP pending; D-dimer 2.38  -Covid Labs pending  - Order RT consult via Respiratory Communication for COVID Protocols  - Incentive Spirometer Q4h to promote lung compliance   - Continuous Pulse Oximetry, goal SpO2 92-96%  - supplemental O2 PRN, will wean as tolerated  - Albuterol INH Q6h scheduled & PRN   - MVI & ascorbic acid 500mg PO BID  -Anti-tussives for cough  - Acetaminophen Q6hr PRN fever/headache  - Loperamide PRN viral diarrhea  - VTE PPx: enoxaparin 1mg/kg q24h in setting of VIRGILIO  - PT/OT for debility/weakness  - If deterioration, may warrant trial of NIPPV in neg pressure room or immediate ICU consult  - Add dexamethasone - Stopped due to elevated BG with HHS and respiratory-wise clinically improving

## 2022-06-22 NOTE — CONSULTS
Food & Nutrition  Education    Diet Education: Carbohydrate controlled Diet Education   Time Spent: 0 minutes  Learners: Patient       Nutrition Education provided with handouts: Heart Healthy Consistent CHO Nutrition Therapy, Diabetes Label Reading Tips, Using Nutrition Labels CHO, Choose Your Foods List: Non-Starchy Vegetables, Sodium Free Seasonings       Comments: Pt receiving diabetic 2000 kcal diet with 100% intake recorded. On COVID isolation, provided handouts to ADAM Jordan to give to pt with RD contact number and name written. Also, provided typed explanation of education, reviews 1600 kcal heart healthy consistent CHO diet. (See below) Per chart hyperglycemia possibly correlated with steroid treatment. Attempted to call pt, but did not answer.  Phone may be out of reach or pt sleeping. RD will attempt to call again later this week.      A1C 7.9  Glu 237 (6/21/22 Glu high of 710)     All questions and concerns answered. Dietitian's contact information provided.       Follow-Up: 6/24/22    Please Re-consult as needed        Thanks!      Typed Explanation Page    Hello, I have highlighted the most important information in the packet. It explains step by step how to calculate your CHO (carbohydrate) servings per food by grams (gm) or by CHO serving/choice.     The goal for a 1600 calorie per day meal plan is to have 180 gm of CHO or 12 CHO choices per day. This breaks down into 3 CHO servings/choices per meal and 1 CHO choice/serving per snack. That is if you eat 3 meals and 3 snacks in a day. If you break up your meals and snacks differently the goal is to spread your CHO servings evenly throughout the day to keep your blood glucose/sugars consistent throughout the day. If you are consistently active or work a manual labor job, then you may need to go up in calories and CHO servings, but this is a good place to start.     Eating balanced meals/snacks consisting of lean proteins, heart healthy fats and whole  grain CHO is the best way to keep your blood sugars consistent. This could be as simple as peanut butter on whole grain crackers or apple slices (with the skin) and peanut butter. Even cheese, crackers and some fruit or yogurt with some granola. In the packet I have put the non-starchy vegetables page this page is very important it is a great resource to get a lot of vitamins, minerals, and fiber. You can consume 3 servings of non-starchy vegetables to equal 1 CHO serving. That is 3 cups raw non-starchy vegetables like a huge salad or 1 ½ cups cooked non-starchy vegetables like tomato sauce.     Make sure to always read the nutrition label and keep track of your CHO intake. It may help to start off with a food journal and writing your blood sugars with the meals and tracking which foods raise your blood sugars significantly.     Once diagnosed with diabetes most insurances cover outpatient diabetes education/monitoring with a registered dietitian or diabetes specialist. You can talk to your primary care physician about setting this up. I know [facility] has a great clinic in [place] and other locations as well.     I know this is a lot to take on but remember your healthcare staff is here to help you to their best abilities please ask as many questions as you see fit. I will also put a website that has great recipes with all their nutrition facts linked to the recipe. 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    [email or web address]

## 2022-06-22 NOTE — ASSESSMENT & PLAN NOTE
Patient presents with shortness of breath and PELAYO. Bibasilar rales and peripheral edema on exam; L >R edema  - consistent clinical presentation;   -Troponin 0.015; likely demand; will continue to monitor and trend  -CXR revealed diffuse pulmonary edema with right small pleural effusion  - initiated lasix 40mg IV  in ED, will continue at 40mg IV q12hr. Hold lasix for now due to elevated BG with HHS  - continue home BB, will hold ARB in setting of VIRGILIO  - Daily weights  -Strict I/Os  - Monitor on telemetry  - Monitor and trend BMP, Mg, and renal function; keep K >4, Mg >2  -Sodium restriction (<2g/d), fluid restriction (<2L)   - 2D echo to assess cardiac function and valvular dysfunction.

## 2022-06-22 NOTE — PLAN OF CARE
OT chery performed, report to follow    May benefit from home w/HH OT/PT    Problem: Occupational Therapy  Goal: Occupational Therapy Goal  Description: Goals to be met by: 7/22     Patient will increase functional independence with ADLs by performing:    UE Dressing with Modified [place].  LE Dressing with Modified [place].  Grooming while standing at sink with Modified [place].  Toileting from toilet with Modified [place] for hygiene and clothing management.   Toilet transfer to toilet with Modified [place].  Upper extremity exercise program x10 reps per handout, with independence.    Outcome: Ongoing, Progressing

## 2022-06-22 NOTE — PLAN OF CARE
Pt on oxygen in no apparent distress.  MDI's times 3 tx. Given with ok pt. Effort.  Will cont. To monitor.

## 2022-06-22 NOTE — PLAN OF CARE
Step 1: Admit - Current ([facility] - IP GENERAL HEART FAILURE - [facility])  Nurse: Patient education provided (located in [employer])  Outcome: Not Met  Variance Clinical complication  Nurse: Standing weight recorded within 1 hour of arrival to floor  Outcome: Not Met  Variance Clinical complication  Nurse: Daily standing weights recorded (routine at 6 AM)  Outcome: Not Met  Variance Clinical complication  Nurse: Urine output recorded (4 hrs after first lasix dose/750mL)  Outcome: Met     Step 1 - Admission - Current ([facility] - IP DIABETIC KETOACIDOSIS (DKA/HHS) - [facility])  RN:  RADHA drawn Q2-Q6  Outcome: Met     Problem: Adult Inpatient Plan of Care  Goal: Plan of Care Review  Outcome: Ongoing, Progressing  Flowsheets (Taken 6/22/2022 0839)  Plan of Care Reviewed With: patient     Problem: Diabetes Comorbidity  Goal: Blood Glucose Level Within Targeted Range  Outcome: Ongoing, Progressing  Intervention: Monitor and Manage Glycemia  Flowsheets (Taken 6/22/2022 0839)  Glycemic Management:   blood glucose monitored   insulin infusion initiated     Problem: Fluid and Electrolyte Imbalance (Acute Kidney Injury/Impairment)  Goal: Fluid and Electrolyte Balance  Outcome: Ongoing, Progressing  Intervention: Monitor and Manage Fluid and Electrolyte Balance  Flowsheets (Taken 6/22/2022 0839)  Fluid/Electrolyte Management:   fluids restricted   intravenous fluids adjusted     Problem: Diabetic Ketoacidosis  Goal: Fluid and Electrolyte Balance with Absence of Ketosis  Outcome: Ongoing, Progressing  Intervention: Monitor and Manage Ketoacidosis  Flowsheets (Taken 6/22/2022 0839)  Fluid/Electrolyte Management:   fluids restricted   intravenous fluids adjusted  Glycemic Management:   blood glucose monitored   insulin infusion initiated       Patient transferred from telemetry to ICU for hyperglycemia and Insulin drip initiated. Patient remained in isolation. Line and lauren remained in place overnight. Oxygen remains on. Trialed on  hospital CPAP but disliked and patient educated on home CPAP use.  Insulin drip titrated per protocol. No family present at bedside. Patient to inform daughter, Betsy, of transfer to ICU. Patient personal belongings included but not limited to clothes, cell phone, glasses, jewelery, and bilateral hearing aids and  remain at patient bedside. Bed alarm on. Bed in lowest position. Call light in reach. Room near nurses' station. Insulin handoff and report given to day shift nurse.

## 2022-06-22 NOTE — ASSESSMENT & PLAN NOTE
Estimated Creatinine Clearance: 11.4 mL/min (A) (based on SCr of 3.1 mg/dL (H)).  Baseline Cr: 2.1Baseline BUN:46   Recent Labs   Lab 06/21/22  0232 06/21/22  2042 06/21/22  2347 06/22/22  0353 06/22/22  0818   CO2 25   < > 20* 19*  21* 20*   BUN 65*   < > 85* 85*  83* 83*   CREATININE 2.6*   < > 3.5* 3.4*  3.5* 3.1*   MG 2.2  --   --   --   --    PHOS  --   --   --   --  5.9*    < > = values in this interval not displayed.       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 Will hold diuresis with IV Lasix  -Monitor strict urine output  - Continue to monitor renal function with daily labs and trend electrolytes  - renally dose medications  - avoid nephrotoxic agents including NSAIDs, aminoglycosides, IV contrast (unless absolutely necessary), gadolinium, fleets and other phosphorous-based laxatives  - monitor events that may lead to decreased renal perfusion (hypovolemia, hypotension, sepsis)  - monitor urine output to assure that no obstruction precipitates worsening in GFR

## 2022-06-22 NOTE — SUBJECTIVE & OBJECTIVE
Interval History: Reports SOB much better. Comfortable on 2L NC. On COVID-19 protocol with Remdesivir. In ICU and on insulin gtt due to elevated BG 600s. Steroids and IV lasix stopped.     Review of Systems   Constitutional:  Negative for appetite change.   HENT:  Positive for congestion.    Respiratory:  Positive for cough and shortness of breath (improving). Negative for wheezing.    Cardiovascular:  Negative for chest pain and leg swelling.   Gastrointestinal:  Negative for nausea.   Skin:  Negative for rash.   Neurological:  Negative for dizziness and headaches.   Psychiatric/Behavioral:  Negative for confusion.    Objective:     Vital Signs (Most Recent):  Temp: 98.2 °F (36.8 °C) (06/22/22 0745)  Pulse: 85 (06/22/22 0915)  Resp: (!) 23 (06/22/22 0915)  BP: (!) 186/76 (06/22/22 0900)  SpO2: 100 % (06/22/22 0915)   Vital Signs (24h Range):  Temp:  [96.2 °F (35.7 °C)-98.2 °F (36.8 °C)] 98.2 °F (36.8 °C)  Pulse:  [69-89] 85  Resp:  [15-27] 23  SpO2:  [97 %-100 %] 100 %  BP: (129-186)/(59-99) 186/76     Weight: 62.5 kg (137 lb 12.6 oz)  Body mass index is 24.41 kg/m².    Intake/Output Summary (Last 24 hours) at 6/22/2022 1039  Last data filed at 6/22/2022 0900  Gross per 24 hour   Intake 1298 ml   Output 2000 ml   Net -702 ml      Physical Exam  Constitutional:       General: She is awake. She is not in acute distress.     Appearance: She is not diaphoretic.   HENT:      Head: Normocephalic and atraumatic.      Nose: No congestion or rhinorrhea.      Mouth/Throat:      Mouth: Mucous membranes are moist.   Eyes:      Conjunctiva/sclera: Conjunctivae normal.   Cardiovascular:      Rate and Rhythm: Normal rate.      Heart sounds: No murmur heard.  Pulmonary:      Effort: Pulmonary effort is normal. No respiratory distress.      Breath sounds: Decreased air movement (R lung) present. No wheezing or rales.      Comments: 2L NC. Sats %. Congestive cough  Chest:      Chest wall: No tenderness.   Abdominal:       General: Bowel sounds are normal.      Palpations: Abdomen is soft.      Tenderness: There is no abdominal tenderness.   Genitourinary:     Comments: [place] in place  Musculoskeletal:      Cervical back: Neck supple.      Right lower leg: Edema (trace) present.      Left lower leg: Edema (trace) present.   Skin:     General: Skin is warm and dry.   Neurological:      Mental Status: She is alert and oriented to person, place, and time.   Psychiatric:         Mood and Affect: Mood normal.       Significant Labs: All pertinent labs within the past 24 hours have been reviewed.    Significant Imaging: I have reviewed all pertinent imaging results/findings within the past 24 hours.

## 2022-06-22 NOTE — PLAN OF CARE
Mrs. Quinones, will continue to work with physical and occupational therapy, Up in the chair at the bedside , ambulated with the walker.She will be encouraged to turn and shift her weight when in bed, heels floated off the mattress, supported with pillows to prevent pressure areas to the skin. Blood glucose continues to wax and wane, she said it does that at home .

## 2022-06-23 LAB
ANION GAP SERPL CALC-SCNC: 13 MMOL/L (ref 8–16)
ANION GAP SERPL CALC-SCNC: 14 MMOL/L (ref 8–16)
ANION GAP SERPL CALC-SCNC: 14 MMOL/L (ref 8–16)
ANION GAP SERPL CALC-SCNC: 15 MMOL/L (ref 8–16)
ANION GAP SERPL CALC-SCNC: 16 MMOL/L (ref 8–16)
BACTERIA UR CULT: NO GROWTH
BASOPHILS # BLD AUTO: 0.01 K/UL (ref 0–0.2)
BASOPHILS NFR BLD: 0.1 % (ref 0–1.9)
BUN SERPL-MCNC: 92 MG/DL (ref 8–23)
BUN SERPL-MCNC: 93 MG/DL (ref 8–23)
BUN SERPL-MCNC: 94 MG/DL (ref 8–23)
BUN SERPL-MCNC: 95 MG/DL (ref 8–23)
BUN SERPL-MCNC: 95 MG/DL (ref 8–23)
CALCIUM SERPL-MCNC: 7.8 MG/DL (ref 8.7–10.5)
CALCIUM SERPL-MCNC: 8 MG/DL (ref 8.7–10.5)
CALCIUM SERPL-MCNC: 8 MG/DL (ref 8.7–10.5)
CALCIUM SERPL-MCNC: 8.2 MG/DL (ref 8.7–10.5)
CALCIUM SERPL-MCNC: 8.2 MG/DL (ref 8.7–10.5)
CHLORIDE SERPL-SCNC: 96 MMOL/L (ref 95–110)
CHLORIDE SERPL-SCNC: 97 MMOL/L (ref 95–110)
CHLORIDE SERPL-SCNC: 98 MMOL/L (ref 95–110)
CHLORIDE SERPL-SCNC: 99 MMOL/L (ref 95–110)
CHLORIDE SERPL-SCNC: 99 MMOL/L (ref 95–110)
CO2 SERPL-SCNC: 21 MMOL/L (ref 23–29)
CO2 SERPL-SCNC: 21 MMOL/L (ref 23–29)
CO2 SERPL-SCNC: 23 MMOL/L (ref 23–29)
CO2 SERPL-SCNC: 24 MMOL/L (ref 23–29)
CO2 SERPL-SCNC: 24 MMOL/L (ref 23–29)
CREAT SERPL-MCNC: 2.4 MG/DL (ref 0.5–1.4)
CREAT SERPL-MCNC: 2.5 MG/DL (ref 0.5–1.4)
CREAT SERPL-MCNC: 2.5 MG/DL (ref 0.5–1.4)
CREAT SERPL-MCNC: 2.7 MG/DL (ref 0.5–1.4)
CREAT SERPL-MCNC: 3 MG/DL (ref 0.5–1.4)
DIFFERENTIAL METHOD: ABNORMAL
EOSINOPHIL # BLD AUTO: 0 K/UL (ref 0–0.5)
EOSINOPHIL NFR BLD: 0.1 % (ref 0–8)
ERYTHROCYTE [DISTWIDTH] IN BLOOD BY AUTOMATED COUNT: 14.5 % (ref 11.5–14.5)
EST. GFR  (AFRICAN AMERICAN): 16 ML/MIN/1.73 M^2
EST. GFR  (AFRICAN AMERICAN): 18 ML/MIN/1.73 M^2
EST. GFR  (AFRICAN AMERICAN): 20 ML/MIN/1.73 M^2
EST. GFR  (AFRICAN AMERICAN): 20 ML/MIN/1.73 M^2
EST. GFR  (AFRICAN AMERICAN): 21 ML/MIN/1.73 M^2
EST. GFR  (NON AFRICAN AMERICAN): 14 ML/MIN/1.73 M^2
EST. GFR  (NON AFRICAN AMERICAN): 16 ML/MIN/1.73 M^2
EST. GFR  (NON AFRICAN AMERICAN): 17 ML/MIN/1.73 M^2
EST. GFR  (NON AFRICAN AMERICAN): 17 ML/MIN/1.73 M^2
EST. GFR  (NON AFRICAN AMERICAN): 18 ML/MIN/1.73 M^2
GLUCOSE SERPL-MCNC: 101 MG/DL (ref 70–110)
GLUCOSE SERPL-MCNC: 102 MG/DL (ref 70–110)
GLUCOSE SERPL-MCNC: 145 MG/DL (ref 70–110)
GLUCOSE SERPL-MCNC: 252 MG/DL (ref 70–110)
GLUCOSE SERPL-MCNC: 445 MG/DL (ref 70–110)
HCT VFR BLD AUTO: 24.2 % (ref 37–48.5)
HGB BLD-MCNC: 7.8 G/DL (ref 12–16)
IMM GRANULOCYTES # BLD AUTO: 0.11 K/UL (ref 0–0.04)
IMM GRANULOCYTES NFR BLD AUTO: 0.9 % (ref 0–0.5)
LYMPHOCYTES # BLD AUTO: 1.2 K/UL (ref 1–4.8)
LYMPHOCYTES NFR BLD: 9.6 % (ref 18–48)
MCH RBC QN AUTO: 28.4 PG (ref 27–31)
MCHC RBC AUTO-ENTMCNC: 32.2 G/DL (ref 32–36)
MCV RBC AUTO: 88 FL (ref 82–98)
MONOCYTES # BLD AUTO: 0.8 K/UL (ref 0.3–1)
MONOCYTES NFR BLD: 6.5 % (ref 4–15)
NEUTROPHILS # BLD AUTO: 10.5 K/UL (ref 1.8–7.7)
NEUTROPHILS NFR BLD: 82.8 % (ref 38–73)
NRBC BLD-RTO: 0 /100 WBC
PHOSPHATE SERPL-MCNC: 5.4 MG/DL (ref 2.7–4.5)
PLATELET # BLD AUTO: 255 K/UL (ref 150–450)
PMV BLD AUTO: 10.4 FL (ref 9.2–12.9)
POCT GLUCOSE: 106 MG/DL (ref 70–110)
POCT GLUCOSE: 110 MG/DL (ref 70–110)
POCT GLUCOSE: 122 MG/DL (ref 70–110)
POCT GLUCOSE: 150 MG/DL (ref 70–110)
POCT GLUCOSE: 151 MG/DL (ref 70–110)
POCT GLUCOSE: 162 MG/DL (ref 70–110)
POCT GLUCOSE: 193 MG/DL (ref 70–110)
POCT GLUCOSE: 207 MG/DL (ref 70–110)
POCT GLUCOSE: 208 MG/DL (ref 70–110)
POCT GLUCOSE: 282 MG/DL (ref 70–110)
POCT GLUCOSE: 302 MG/DL (ref 70–110)
POCT GLUCOSE: 339 MG/DL (ref 70–110)
POCT GLUCOSE: 445 MG/DL (ref 70–110)
POTASSIUM SERPL-SCNC: 3.9 MMOL/L (ref 3.5–5.1)
POTASSIUM SERPL-SCNC: 4.2 MMOL/L (ref 3.5–5.1)
POTASSIUM SERPL-SCNC: 4.3 MMOL/L (ref 3.5–5.1)
POTASSIUM SERPL-SCNC: 4.4 MMOL/L (ref 3.5–5.1)
POTASSIUM SERPL-SCNC: 4.4 MMOL/L (ref 3.5–5.1)
RBC # BLD AUTO: 2.75 M/UL (ref 4–5.4)
SODIUM SERPL-SCNC: 132 MMOL/L (ref 136–145)
SODIUM SERPL-SCNC: 134 MMOL/L (ref 136–145)
SODIUM SERPL-SCNC: 135 MMOL/L (ref 136–145)
SODIUM SERPL-SCNC: 136 MMOL/L (ref 136–145)
SODIUM SERPL-SCNC: 137 MMOL/L (ref 136–145)
WBC # BLD AUTO: 12.7 K/UL (ref 3.9–12.7)

## 2022-06-23 PROCEDURE — 84100 ASSAY OF PHOSPHORUS: CPT | Performed by: HOSPITALIST

## 2022-06-23 PROCEDURE — 80048 BASIC METABOLIC PNL TOTAL CA: CPT | Performed by: HOSPITALIST

## 2022-06-23 PROCEDURE — 94761 N-INVAS EAR/PLS OXIMETRY MLT: CPT

## 2022-06-23 PROCEDURE — 25000003 PHARM REV CODE 250: Performed by: NURSE PRACTITIONER

## 2022-06-23 PROCEDURE — 63600175 PHARM REV CODE 636 W HCPCS

## 2022-06-23 PROCEDURE — 94640 AIRWAY INHALATION TREATMENT: CPT

## 2022-06-23 PROCEDURE — 36415 COLL VENOUS BLD VENIPUNCTURE: CPT | Performed by: HOSPITALIST

## 2022-06-23 PROCEDURE — 25000003 PHARM REV CODE 250

## 2022-06-23 PROCEDURE — 27000221 HC OXYGEN, UP TO 24 HOURS

## 2022-06-23 PROCEDURE — 11000001 HC ACUTE MED/SURG PRIVATE ROOM

## 2022-06-23 PROCEDURE — 80048 BASIC METABOLIC PNL TOTAL CA: CPT | Mod: 91 | Performed by: HOSPITALIST

## 2022-06-23 PROCEDURE — 99900035 HC TECH TIME PER 15 MIN (STAT)

## 2022-06-23 PROCEDURE — 63600175 PHARM REV CODE 636 W HCPCS: Performed by: NURSE PRACTITIONER

## 2022-06-23 PROCEDURE — 85025 COMPLETE CBC W/AUTO DIFF WBC: CPT | Performed by: HOSPITALIST

## 2022-06-23 PROCEDURE — 25000242 PHARM REV CODE 250 ALT 637 W/ HCPCS

## 2022-06-23 PROCEDURE — 27000207 HC ISOLATION

## 2022-06-23 RX ORDER — INSULIN ASPART 100 [IU]/ML
4 INJECTION, SOLUTION INTRAVENOUS; SUBCUTANEOUS
Status: DISCONTINUED | OUTPATIENT
Start: 2022-06-23 | End: 2022-06-24

## 2022-06-23 RX ORDER — TALC
6 POWDER (GRAM) TOPICAL NIGHTLY PRN
Status: DISCONTINUED | OUTPATIENT
Start: 2022-06-23 | End: 2022-07-01 | Stop reason: HOSPADM

## 2022-06-23 RX ORDER — INSULIN ASPART 100 [IU]/ML
0-5 INJECTION, SOLUTION INTRAVENOUS; SUBCUTANEOUS
Status: DISCONTINUED | OUTPATIENT
Start: 2022-06-23 | End: 2022-07-01 | Stop reason: HOSPADM

## 2022-06-23 RX ADMIN — HYDRALAZINE HYDROCHLORIDE 100 MG: 25 TABLET, FILM COATED ORAL at 08:06

## 2022-06-23 RX ADMIN — ALBUTEROL SULFATE 2 PUFF: 90 AEROSOL, METERED RESPIRATORY (INHALATION) at 08:06

## 2022-06-23 RX ADMIN — CARVEDILOL 25 MG: 25 TABLET, FILM COATED ORAL at 08:06

## 2022-06-23 RX ADMIN — FLUTICASONE FUROATE AND VILANTEROL TRIFENATATE 1 PUFF: 100; 25 POWDER RESPIRATORY (INHALATION) at 08:06

## 2022-06-23 RX ADMIN — Medication 6 MG: at 10:06

## 2022-06-23 RX ADMIN — OXYCODONE HYDROCHLORIDE AND ACETAMINOPHEN 500 MG: 500 TABLET ORAL at 08:06

## 2022-06-23 RX ADMIN — ALBUTEROL SULFATE 2 PUFF: 90 AEROSOL, METERED RESPIRATORY (INHALATION) at 01:06

## 2022-06-23 RX ADMIN — GUAIFENESIN AND DEXTROMETHORPHAN HYDROBROMIDE 1 TABLET: 600; 30 TABLET, EXTENDED RELEASE ORAL at 08:06

## 2022-06-23 RX ADMIN — ENOXAPARIN SODIUM 60 MG: 100 INJECTION SUBCUTANEOUS at 05:06

## 2022-06-23 RX ADMIN — SODIUM BICARBONATE 1300 MG: 650 TABLET ORAL at 08:06

## 2022-06-23 RX ADMIN — CARVEDILOL 25 MG: 25 TABLET, FILM COATED ORAL at 09:06

## 2022-06-23 RX ADMIN — ONDANSETRON HYDROCHLORIDE 4 MG: 2 SOLUTION INTRAMUSCULAR; INTRAVENOUS at 05:06

## 2022-06-23 RX ADMIN — THERA TABS 1 TABLET: TAB at 08:06

## 2022-06-23 RX ADMIN — MONTELUKAST 10 MG: 10 TABLET, FILM COATED ORAL at 08:06

## 2022-06-23 RX ADMIN — ACETAMINOPHEN 650 MG: 325 TABLET ORAL at 10:06

## 2022-06-23 RX ADMIN — ACETAMINOPHEN 650 MG: 325 TABLET ORAL at 06:06

## 2022-06-23 RX ADMIN — INSULIN ASPART 4 UNITS: 100 INJECTION, SOLUTION INTRAVENOUS; SUBCUTANEOUS at 12:06

## 2022-06-23 RX ADMIN — REMDESIVIR 100 MG: 100 INJECTION, POWDER, LYOPHILIZED, FOR SOLUTION INTRAVENOUS at 09:06

## 2022-06-23 RX ADMIN — FLUTICASONE PROPIONATE 100 MCG: 50 SPRAY, METERED NASAL at 12:06

## 2022-06-23 RX ADMIN — HYDRALAZINE HYDROCHLORIDE 10 MG: 20 INJECTION, SOLUTION INTRAMUSCULAR; INTRAVENOUS at 05:06

## 2022-06-23 RX ADMIN — INSULIN ASPART 4 UNITS: 100 INJECTION, SOLUTION INTRAVENOUS; SUBCUTANEOUS at 05:06

## 2022-06-23 RX ADMIN — ALBUTEROL SULFATE 2 PUFF: 90 AEROSOL, METERED RESPIRATORY (INHALATION) at 12:06

## 2022-06-23 RX ADMIN — INSULIN ASPART 4 UNITS: 100 INJECTION, SOLUTION INTRAVENOUS; SUBCUTANEOUS at 08:06

## 2022-06-23 RX ADMIN — ONDANSETRON HYDROCHLORIDE 4 MG: 2 SOLUTION INTRAMUSCULAR; INTRAVENOUS at 06:06

## 2022-06-23 RX ADMIN — ALBUTEROL SULFATE 2 PUFF: 90 AEROSOL, METERED RESPIRATORY (INHALATION) at 07:06

## 2022-06-23 RX ADMIN — CEFEPIME 1 G: 1 INJECTION, POWDER, FOR SOLUTION INTRAMUSCULAR; INTRAVENOUS at 09:06

## 2022-06-23 RX ADMIN — SODIUM BICARBONATE 1300 MG: 650 TABLET ORAL at 03:06

## 2022-06-23 RX ADMIN — TIOTROPIUM BROMIDE 18 MCG: 18 CAPSULE ORAL; RESPIRATORY (INHALATION) at 08:06

## 2022-06-23 RX ADMIN — PRAVASTATIN SODIUM 40 MG: 40 TABLET ORAL at 09:06

## 2022-06-23 NOTE — PT/OT/SLP PROGRESS
Occupational Therapy      Patient Name:  Myesha Quinones   MRN:  [id number]    Patient not seen today secondary to Patient fatigue, Patient unwilling to participate. Will follow-up .    6/23/2022

## 2022-06-23 NOTE — PLAN OF CARE
Step 1: Admit - Current ([facility] - IP GENERAL HEART FAILURE - [facility])  Nurse: Patient education provided (located in [employer])  Outcome: Not Met  Variance Clinical complication  Nurse: Standing weight recorded within 1 hour of arrival to floor  Outcome: Not Met  Variance Clinical complication  Nurse: Daily standing weights recorded (routine at 6 AM)  Outcome: Not Met  Variance Clinical complication  Nurse: Urine output recorded (4 hrs after first lasix dose/750mL)  Outcome: Met     Problem: Adult Inpatient Plan of Care  Goal: Plan of Care Review  Outcome: Ongoing, Progressing  Flowsheets (Taken 6/23/2022 0647)  Plan of Care Reviewed With: patient     Problem: Diabetes Comorbidity  Goal: Blood Glucose Level Within Targeted Range  Outcome: Ongoing, Progressing  Intervention: Monitor and Manage Glycemia  Flowsheets (Taken 6/23/2022 0647)  Glycemic Management:   blood glucose monitored   insulin infusion adjusted   supplemental insulin given     Problem: Renal Function Impairment (Acute Kidney Injury/Impairment)  Goal: Effective Renal Function  Outcome: Ongoing, Not Progressing  Intervention: Monitor and Support Renal Function  Flowsheets (Taken 6/23/2022 0647)  Stabilization Measures:   provider notified   respiratory support increased  Medication Review/Management:   medications reviewed   pharmacy consulted   provider consulted     Problem: Diabetic Ketoacidosis  Goal: Fluid and Electrolyte Balance with Absence of Ketosis  Outcome: Ongoing, Progressing  Intervention: Monitor and Manage Ketoacidosis  Flowsheets (Taken 6/23/2022 0647)  Fluid/Electrolyte Management: intravenous fluid replacement initiated  Glycemic Management:   blood glucose monitored   insulin infusion adjusted   supplemental insulin given       Patient back on Insulin drip last night after being discontinued during the day for rebound hyperglycemia. Blood sugar WNL by change of shift. Patient with one BM. IV access gained during shift. No  family present at bedside overnight. Patient with lines and tubes intact. Patient remains in isolation. Bed alarm on. Bed in lowest position. Call light in reach. Room near nurses' station. Report to be given and care to be relinquished to day shift RN.

## 2022-06-23 NOTE — PLAN OF CARE
The sw spoke to the pt's dtr Betsy [phone] in regards to her preference for the pt's hh agency. She states they used [facility] in the past and didn't like them. [facility] is [facility]. The sw faxed the referral to them via [employer].        06/23/22 3524   Post-Acute Status   Post-Acute Authorization Home Health   Home Health Status Referrals Sent   Discharge Delays None known at this time   Discharge Plan   Discharge Plan A Home Health   Discharge Plan B Other  (TBD)

## 2022-06-23 NOTE — ASSESSMENT & PLAN NOTE
-Progressive dyspnea over the past two weeks  -may be in the setting of acute COPD exacerbation, ADHF, and COVID-19 infection  -Procalcitonin negative  -COVID-19 protocol initiated - On Remdesivir  -D.Dimer 2.38; may be multifactorial as stated above  -L >R LLE edema; bilateral LLE US - No DVTnoted  -Initiate Lovenox 1mg/Kg daily in setting of VIRGILIO  - Breathing treatment  - IV lasix - hold due to HHS  - Supplemental oxygen titrate as needed  - Continuous pulse ox      Patient with Hypoxic Respiratory failure which is Acute.  she is not on home oxygen. Supplemental ventilation was provided and noted-  .   Differential diagnosis includes - CHF, COPD, Pleural effusion and COVID-19 infection, MAC, chronic Aspergillosis Labs and images were reviewed. Patient Has not has a recent ABG. Will treat underlying causes and adjust management of respiratory failure as above    - weaning oxygen

## 2022-06-23 NOTE — PROGRESS NOTES
"[facility] Medicine  Progress Note    Patient Name: Myesha Quinones  MRN: [id number]  Patient Class: IP- Inpatient   Admission Date: 6/20/2022  Length of Stay: 1 days  Attending Physician: Yrn Sheppard MD  Primary Care Provider: Catrachita Rothman MD        Subjective:     Principal Problem:Acute respiratory failure with hypoxia        HPI:  Myesha Quinones has a past medical history of CAD, combined systolic and diastolic heart failure, LBBB, biventricular ICD, CKD4, IDDM, breast cancer, HTN, HLD, severe persistent asthma, COPD, and HLD presents to [facility] ED with complaints of SOB. She states her SOB is associated with a productive cough. She states her SOB has been progressively worsened over the past two weeks. She reports she was seen by her pulmonologist and was prescribed antibiotic and prednisone which she started last Tuesday. She states she did not complete her antibiotics or steroid therapy. She denies fever, chest pain, palpitations, N/V, or diarrhea.     She was seen by her pulmonolgist, Dr. Maki on 6/13/22 for shortness of breath. Reported cough and congestion attributing to sinus infection. She was also noted to have a current MAC infection which she is not on therapy. She was started on 10 day course of Augmentin with 10mg PO prednisone to accompany.     Of Note: She was primarily followed by ID, Dr. Amaya, for ABPA (allergic bronchopulmonary aspergillosis) and was started on Voriconazole therapy in which she did not tolerate.       In the ED: BP (!) 165/107   Pulse 83   Temp 98 °F (36.7 °C) (Axillary)   Resp (!) 22   Ht 5' 3" (1.6 m)   Wt 59 kg (130 lb)   LMP  (LMP Unknown)   SpO2 (!) 94%   BMI 23.03 kg/m² Labs revealed WBC 13.35, H/H 9/27.8, BUN 64, creatinine 2.8, Glucose 161, . Troponin 0.015. 12 lead EKG revealed Normal sinus rhythm, Left axis deviation, Right bundle branch block. She is Covid positive. CXR revealed diffuse pulmonary edema with a right " small pleural effusion. She received Duo-neb tx x1, Lasix 40mg IV x1, and Prednisone 40mg PO x1. ID consulted to follow.         Overview/Hospital Course:  No notes on file    Interval History: Reports SOB improved. Complains worse cough. Comfortable on 2L NC. On COVID-19 protocol with Remdesivir. Off insulin gtt. Stable. Plan to transfer to floor    Review of Systems   Constitutional:  Negative for appetite change.   HENT:  Positive for congestion.    Respiratory:  Positive for cough and shortness of breath (improving). Negative for wheezing.    Cardiovascular:  Negative for chest pain and leg swelling.   Gastrointestinal:  Negative for nausea.   Skin:  Negative for rash.   Neurological:  Negative for dizziness and headaches.   Psychiatric/Behavioral:  Negative for confusion.    Objective:     Vital Signs (Most Recent):  Temp: 98.1 °F (36.7 °C) (06/23/22 1200)  Pulse: 68 (06/23/22 1530)  Resp: 15 (06/23/22 1530)  BP: (!) 169/75 (06/23/22 1530)  SpO2: 98 % (06/23/22 1530)   Vital Signs (24h Range):  Temp:  [97.9 °F (36.6 °C)-98.3 °F (36.8 °C)] 98.1 °F (36.7 °C)  Pulse:  [65-92] 68  Resp:  [15-36] 15  SpO2:  [95 %-100 %] 98 %  BP: (144-200)/(62-99) 169/75     Weight: 59.5 kg (131 lb 2.8 oz)  Body mass index is 23.24 kg/m².    Intake/Output Summary (Last 24 hours) at 6/23/2022 1606  Last data filed at 6/23/2022 1505  Gross per 24 hour   Intake 887.59 ml   Output 2480 ml   Net -1592.41 ml        Physical Exam  Constitutional:       General: She is awake. She is not in acute distress.     Appearance: She is not diaphoretic.   HENT:      Head: Normocephalic and atraumatic.      Nose: No congestion or rhinorrhea.      Mouth/Throat:      Mouth: Mucous membranes are moist.   Eyes:      Conjunctiva/sclera: Conjunctivae normal.   Cardiovascular:      Rate and Rhythm: Normal rate.      Heart sounds: No murmur heard.  Pulmonary:      Effort: Pulmonary effort is normal. No respiratory distress.      Breath sounds: Decreased air  movement (R lung) present. No wheezing or rales.      Comments: 2L NC. Sats %. Congestive cough  Chest:      Chest wall: No tenderness.   Abdominal:      General: Bowel sounds are normal.      Palpations: Abdomen is soft.      Tenderness: There is no abdominal tenderness.   Genitourinary:     Comments: De La Cruz in place  Musculoskeletal:      Cervical back: Neck supple.      Right lower leg: Edema (trace) present.      Left lower leg: Edema (trace) present.   Skin:     General: Skin is warm and dry.   Neurological:      Mental Status: She is alert and oriented to person, place, and time.   Psychiatric:         Mood and Affect: Mood normal.       Significant Labs: All pertinent labs within the past 24 hours have been reviewed.    Significant Imaging: I have reviewed all pertinent imaging results/findings within the past 24 hours.      Assessment/Plan:      * Acute respiratory failure with hypoxia  -Progressive dyspnea over the past two weeks  -may be in the setting of acute COPD exacerbation, ADHF, and COVID-19 infection  -Procalcitonin negative  -COVID-19 protocol initiated - On Remdesivir  -D.Dimer 2.38; may be multifactorial as stated above  -L >R LLE edema; bilateral LLE US - No DVTnoted  -Initiate Lovenox 1mg/Kg daily in setting of VIRGILIO  - Breathing treatment  - IV lasix - hold due to HHS  - Supplemental oxygen titrate as needed  - Continuous pulse ox      Patient with Hypoxic Respiratory failure which is Acute.  she is not on home oxygen. Supplemental ventilation was provided and noted-  .   Differential diagnosis includes - CHF, COPD, Pleural effusion and COVID-19 infection, MAC, chronic Aspergillosis Labs and images were reviewed. Patient Has not has a recent ABG. Will treat underlying causes and adjust management of respiratory failure as above    - weaning oxygen    Hyperosmolar hyperglycemic state (HHS)  -Patient is with evidence of HHS given hyperglycemia, elevated anion gap metabolic acidosis,  glucosuria,  -Etiology likely 2/2 high dose steroid use  - Received IVF bolus 1L over night  - Received regular insulin bolus and started on weight-based insulin infusion until the anion-gap returns to normal and blood glucose is stabilized.    -Hourly AccuCheks  -check BMPs every 4 hours. Will replace potassium as necessary.    -Will start basal-bolus insulin therapy thereafter as well as an oral diet.    -Resolved      Acute on chronic combined systolic and diastolic CHF (congestive heart failure)  Patient presents with shortness of breath and PELAYO. Bibasilar rales and peripheral edema on exam; L >R edema  - consistent clinical presentation;   -Troponin 0.015; likely demand; will continue to monitor and trend  -CXR revealed diffuse pulmonary edema with right small pleural effusion  - initiated lasix 40mg IV  in ED, will continue at 40mg IV q12hr. Hold lasix for now due to elevated BG with HHS  - continue home BB, will hold ARB in setting of VIRGILIO  - Daily weights  -Strict I/Os  - Monitor on telemetry  - Monitor and trend BMP, Mg, and renal function; keep K >4, Mg >2  -Sodium restriction (<2g/d), fluid restriction (<2L)   - 2D echo to assess cardiac function and valvular dysfunction.     Results for orders placed during the hospital encounter of 06/20/22    Echo    Interpretation Summary  · The left ventricle is normal in size with concentric hypertrophy and normal systolic function.  · The estimated ejection fraction is 55%.  · Grade II left ventricular diastolic dysfunction.  · Normal right ventricular size with normal right ventricular systolic function.  · Severe left atrial enlargement.  · Mild mitral regurgitation.  · Mild to moderate tricuspid regurgitation.  · Intermediate central venous pressure (8 mmHg).  · The estimated PA systolic pressure is 56 mmHg.  · There is pulmonary hypertension.  · There is a left pleural effusion.                COVID-19 virus infection  - COVID positive 6/20/22; initiated  on protocol  - Isolation: Airborne/Droplet. Surgical mask on patient. Notify Infection Control  - CXR with diffuse pulmonary edema with small right pleural effusion, requiring O2  - Monitor on Telemetry  -Received 40mg PO Prednisone x1 in ED   - initiated on remdesivir x5d; daily CMP  - CRP pending; D-dimer 2.38  -Covid Labs pending  - Order RT consult via Respiratory Communication for COVID Protocols  - Incentive Spirometer Q4h to promote lung compliance   - Continuous Pulse Oximetry, goal SpO2 92-96%  - supplemental O2 PRN, will wean as tolerated  - Albuterol INH Q6h scheduled & PRN   - MVI & ascorbic acid 500mg PO BID  -Anti-tussives for cough  - Acetaminophen Q6hr PRN fever/headache  - Loperamide PRN viral diarrhea  - VTE PPx: enoxaparin 1mg/kg q24h in setting of VIRGILIO  - PT/OT for debility/weakness  - If deterioration, may warrant trial of NIPPV in neg pressure room or immediate ICU consult  - Add dexamethasone - Stopped due to elevated BG with HHS and respiratory-wise clinically improving        Chronic obstructive pulmonary disease with acute exacerbation  Clinically-stable.  Afebrile. Elevation of WBC, Productive Cough.    --Received Duo-neb x1 and 40mg PO Prednisone x1 in ED  --Will continue home regimen of maintenance inhalers  -resume home montelukast and Flonase   --Albuterol INH q6 scheduled in setting of COVID-19 infection  --PRN Oxygen per Nasal Cannula for SpO2 <90%  --Pulse-Ox Monitoring every 4 hours  --Monitor respiratory status closely        Acute kidney injury superimposed on chronic kidney disease  Estimated Creatinine Clearance: 14.7 mL/min (A) (based on SCr of 2.4 mg/dL (H)).  Baseline Cr: 2.1Baseline BUN:46   Recent Labs   Lab 06/21/22  0232 06/21/22  2042 06/22/22  0818 06/22/22  1249 06/23/22  0401 06/23/22  0757 06/23/22  1233   CO2 25   < > 20*   < > 21* 24 24   BUN 65*   < > 83*   < > 95* 94* 93*   CREATININE 2.6*   < > 3.1*   < > 2.7* 2.5* 2.4*   MG 2.2  --   --   --   --   --   --     PHOS  --   --  5.9*  --   --  5.4*  --     < > = values in this interval not displayed.       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 Will hold diuresis with IV Lasix  -Monitor strict urine output  - Continue to monitor renal function with daily labs and trend electrolytes  - renally dose medications  - avoid nephrotoxic agents including NSAIDs, aminoglycosides, IV contrast (unless absolutely necessary), gadolinium, fleets and other phosphorous-based laxatives  - monitor events that may lead to decreased renal perfusion (hypovolemia, hypotension, sepsis)  - monitor urine output to assure that no obstruction precipitates worsening in GFR    - improving        UTI (urinary tract infection)  Presenting with urinary retention. UA in ED with leukocytes. Urine culture 6/1/22 prior admission grew Acinetobacter Baumannii  > 100,000 cfu/ml  - Off Cipro due to prolong QTc. Now on Cefepime for pneumonia  - F/u urine culture - No growth      Type 2 diabetes mellitus, with long-term current use of insulin  Hemoglobin A1C   Date Value Ref Range Status   02/22/2022 5.9 (H) 4.0 - 5.6 % Final     -Serum glucose on admit 161  - hold home oral medications  - LDSSI with ACCUcheck ACHS  - Diabetic diet  -Hypoglycemia protocol PRN  -Monitor closely and adjust insulin regimen as clinically indicated to achieve levels of 140-180 during hospitalization        Essential hypertension  -Resume home BB and hydralazine; hold ARB in setting of VIRGILIO  -Monitor and trend vital signs q4hr   -Anti-hypertensives PRN for SBP >180      Mycobacterium avium complex  See above      ABPA (allergic bronchopulmonary aspergillosis)  Mycobacterium avium complex    -Followed by ID, Dr. Amaya; was placed on Voriconazole therapy but unable to tolerate  -Presents to ED with worsening SOB not improved with recent trial of Augmentin and Prednisone  -Procalcitonin normal  -ID consulted to evaluate and assist in  "antibiotic administration. Per ID "Pt can be colonized with pseudomonas- had recurrent PsA in past in sputum- but can consider 5 day couse but not more than 1gm IV daily since Cr Cl was 13.9"  -Start Cefepime 1 g q24h for 5 days      Urinary retention  Hx with urinary retention due to prolapse required lauren in the past. Bladder scanned for 1240 on admission  - Lauren placed in ED  - Continue  Lauren        Hyperlipidemia associated with type 2 diabetes mellitus  Last LDL was   Lab Results   Component Value Date    LDLCALC 29.8 (L) 04/03/2022      Patient is chronically on statin.will continue for now.   Monitor clinically.          Dyspnea  -Progressive dyspnea over the past two weeks  -may be in the setting of acute COPD exacerbation, ADHF, and COVID-19 infection  -Procalcitonin pending  -COVID-19 protocol initiated  -D.Dimer 2.38; may be multifactorial as stated above  -L >R LLE edema; bilateral LLE US - No DVTnoted  -Will initiate Lovenox 1mg/Kg daily in setting of VIRGILIO      Biventricular ICD (implantable cardioverter-defibrillator) in place  -Noted        VTE Risk Mitigation (From admission, onward)         Ordered     enoxaparin injection 60 mg  Every 24 hours (non-standard times)         06/20/22 1608     IP VTE HIGH RISK PATIENT  Once         06/20/22 1603     Place sequential compression device  Until discontinued         06/20/22 1603                Discharge Planning   ALIX: 6/22/2022     Code Status: Full Code   Is the patient medically ready for discharge?:     Reason for patient still in hospital (select all that apply): Patient trending condition and Treatment  Discharge Plan A: Home Health   Discharge Delays: None known at this time        Critical care time spent on the evaluation and treatment of severe organ dysfunction, review of pertinent labs and imaging studies, discussions with consulting providers and discussions with patient/family: 20 minutes.      Malissa Gray NP  Department of Hospital " [facility] - Intensive Care

## 2022-06-23 NOTE — ASSESSMENT & PLAN NOTE
-Patient is with evidence of HHS given hyperglycemia, elevated anion gap metabolic acidosis, glucosuria,  -Etiology likely 2/2 high dose steroid use  - Received IVF bolus 1L over night  - Received regular insulin bolus and started on weight-based insulin infusion until the anion-gap returns to normal and blood glucose is stabilized.    -Hourly AccuCheks  -check BMPs every 4 hours. Will replace potassium as necessary.    -Will start basal-bolus insulin therapy thereafter as well as an oral diet.    -Resolved

## 2022-06-23 NOTE — ASSESSMENT & PLAN NOTE
Estimated Creatinine Clearance: 14.7 mL/min (A) (based on SCr of 2.4 mg/dL (H)).  Baseline Cr: 2.1Baseline BUN:46   Recent Labs   Lab 06/21/22  0232 06/21/22  2042 06/22/22  0818 06/22/22  1249 06/23/22  0401 06/23/22  0757 06/23/22  1233   CO2 25   < > 20*   < > 21* 24 24   BUN 65*   < > 83*   < > 95* 94* 93*   CREATININE 2.6*   < > 3.1*   < > 2.7* 2.5* 2.4*   MG 2.2  --   --   --   --   --   --    PHOS  --   --  5.9*  --   --  5.4*  --     < > = values in this interval not displayed.       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 Will hold diuresis with IV Lasix  -Monitor strict urine output  - Continue to monitor renal function with daily labs and trend electrolytes  - renally dose medications  - avoid nephrotoxic agents including NSAIDs, aminoglycosides, IV contrast (unless absolutely necessary), gadolinium, fleets and other phosphorous-based laxatives  - monitor events that may lead to decreased renal perfusion (hypovolemia, hypotension, sepsis)  - monitor urine output to assure that no obstruction precipitates worsening in GFR    - improving

## 2022-06-23 NOTE — ASSESSMENT & PLAN NOTE
Presenting with urinary retention. UA in ED with leukocytes. Urine culture 6/1/22 prior admission grew Acinetobacter Baumannii  > 100,000 cfu/ml  - Off Cipro due to prolong QTc. Now on Cefepime for pneumonia  - F/u urine culture - No growth

## 2022-06-23 NOTE — SUBJECTIVE & OBJECTIVE
Interval History: Reports SOB improved. Complains worse cough. Comfortable on 2L NC. On COVID-19 protocol with Remdesivir. Off insulin gtt. Stable. Plan to transfer to floor    Review of Systems   Constitutional:  Negative for appetite change.   HENT:  Positive for congestion.    Respiratory:  Positive for cough and shortness of breath (improving). Negative for wheezing.    Cardiovascular:  Negative for chest pain and leg swelling.   Gastrointestinal:  Negative for nausea.   Skin:  Negative for rash.   Neurological:  Negative for dizziness and headaches.   Psychiatric/Behavioral:  Negative for confusion.    Objective:     Vital Signs (Most Recent):  Temp: 98.1 °F (36.7 °C) (06/23/22 1200)  Pulse: 68 (06/23/22 1530)  Resp: 15 (06/23/22 1530)  BP: (!) 169/75 (06/23/22 1530)  SpO2: 98 % (06/23/22 1530)   Vital Signs (24h Range):  Temp:  [97.9 °F (36.6 °C)-98.3 °F (36.8 °C)] 98.1 °F (36.7 °C)  Pulse:  [65-92] 68  Resp:  [15-36] 15  SpO2:  [95 %-100 %] 98 %  BP: (144-200)/(62-99) 169/75     Weight: 59.5 kg (131 lb 2.8 oz)  Body mass index is 23.24 kg/m².    Intake/Output Summary (Last 24 hours) at 6/23/2022 1606  Last data filed at 6/23/2022 1505  Gross per 24 hour   Intake 887.59 ml   Output 2480 ml   Net -1592.41 ml        Physical Exam  Constitutional:       General: She is awake. She is not in acute distress.     Appearance: She is not diaphoretic.   HENT:      Head: Normocephalic and atraumatic.      Nose: No congestion or rhinorrhea.      Mouth/Throat:      Mouth: Mucous membranes are moist.   Eyes:      Conjunctiva/sclera: Conjunctivae normal.   Cardiovascular:      Rate and Rhythm: Normal rate.      Heart sounds: No murmur heard.  Pulmonary:      Effort: Pulmonary effort is normal. No respiratory distress.      Breath sounds: Decreased air movement (R lung) present. No wheezing or rales.      Comments: 2L NC. Sats %. Congestive cough  Chest:      Chest wall: No tenderness.   Abdominal:      General: Bowel  sounds are normal.      Palpations: Abdomen is soft.      Tenderness: There is no abdominal tenderness.   Genitourinary:     Comments: De La Cruz in place  Musculoskeletal:      Cervical back: Neck supple.      Right lower leg: Edema (trace) present.      Left lower leg: Edema (trace) present.   Skin:     General: Skin is warm and dry.   Neurological:      Mental Status: She is alert and oriented to person, place, and time.   Psychiatric:         Mood and Affect: Mood normal.       Significant Labs: All pertinent labs within the past 24 hours have been reviewed.    Significant Imaging: I have reviewed all pertinent imaging results/findings within the past 24 hours.

## 2022-06-23 NOTE — ASSESSMENT & PLAN NOTE
Patient presents with shortness of breath and PELAYO. Bibasilar rales and peripheral edema on exam; L >R edema  - consistent clinical presentation;   -Troponin 0.015; likely demand; will continue to monitor and trend  -CXR revealed diffuse pulmonary edema with right small pleural effusion  - initiated lasix 40mg IV  in ED, will continue at 40mg IV q12hr. Hold lasix for now due to elevated BG with HHS  - continue home BB, will hold ARB in setting of VIRGILIO  - Daily weights  -Strict I/Os  - Monitor on telemetry  - Monitor and trend BMP, Mg, and renal function; keep K >4, Mg >2  -Sodium restriction (<2g/d), fluid restriction (<2L)   - 2D echo to assess cardiac function and valvular dysfunction.     Results for orders placed during the hospital encounter of 06/20/22    Echo    Interpretation Summary  · The left ventricle is normal in size with concentric hypertrophy and normal systolic function.  · The estimated ejection fraction is 55%.  · Grade II left ventricular diastolic dysfunction.  · Normal right ventricular size with normal right ventricular systolic function.  · Severe left atrial enlargement.  · Mild mitral regurgitation.  · Mild to moderate tricuspid regurgitation.  · Intermediate central venous pressure (8 mmHg).  · The estimated PA systolic pressure is 56 mmHg.  · There is pulmonary hypertension.  · There is a left pleural effusion.

## 2022-06-24 LAB
ANION GAP SERPL CALC-SCNC: 14 MMOL/L (ref 8–16)
BUN SERPL-MCNC: 87 MG/DL (ref 8–23)
CALCIUM SERPL-MCNC: 8 MG/DL (ref 8.7–10.5)
CHLORIDE SERPL-SCNC: 100 MMOL/L (ref 95–110)
CO2 SERPL-SCNC: 22 MMOL/L (ref 23–29)
CREAT SERPL-MCNC: 2.2 MG/DL (ref 0.5–1.4)
D DIMER PPP IA.FEU-MCNC: 2.65 MG/L FEU
ERYTHROCYTE [DISTWIDTH] IN BLOOD BY AUTOMATED COUNT: 14.4 % (ref 11.5–14.5)
EST. GFR  (AFRICAN AMERICAN): 23 ML/MIN/1.73 M^2
EST. GFR  (NON AFRICAN AMERICAN): 20 ML/MIN/1.73 M^2
FERRITIN SERPL-MCNC: 113 NG/ML (ref 20–300)
GLUCOSE SERPL-MCNC: 76 MG/DL (ref 70–110)
HCT VFR BLD AUTO: 24.7 % (ref 37–48.5)
HGB BLD-MCNC: 7.7 G/DL (ref 12–16)
LDH SERPL L TO P-CCNC: 195 U/L (ref 110–260)
MCH RBC QN AUTO: 27.8 PG (ref 27–31)
MCHC RBC AUTO-ENTMCNC: 31.2 G/DL (ref 32–36)
MCV RBC AUTO: 89 FL (ref 82–98)
PHOSPHATE SERPL-MCNC: 5.3 MG/DL (ref 2.7–4.5)
PLATELET # BLD AUTO: 247 K/UL (ref 150–450)
PMV BLD AUTO: 10.3 FL (ref 9.2–12.9)
POCT GLUCOSE: 133 MG/DL (ref 70–110)
POCT GLUCOSE: 135 MG/DL (ref 70–110)
POCT GLUCOSE: 93 MG/DL (ref 70–110)
POTASSIUM SERPL-SCNC: 4 MMOL/L (ref 3.5–5.1)
RBC # BLD AUTO: 2.77 M/UL (ref 4–5.4)
SODIUM SERPL-SCNC: 136 MMOL/L (ref 136–145)
WBC # BLD AUTO: 13.74 K/UL (ref 3.9–12.7)

## 2022-06-24 PROCEDURE — 99900035 HC TECH TIME PER 15 MIN (STAT)

## 2022-06-24 PROCEDURE — 25000003 PHARM REV CODE 250

## 2022-06-24 PROCEDURE — 94640 AIRWAY INHALATION TREATMENT: CPT

## 2022-06-24 PROCEDURE — 25000003 PHARM REV CODE 250: Performed by: NURSE PRACTITIONER

## 2022-06-24 PROCEDURE — 27000207 HC ISOLATION

## 2022-06-24 PROCEDURE — 27000221 HC OXYGEN, UP TO 24 HOURS

## 2022-06-24 PROCEDURE — 94761 N-INVAS EAR/PLS OXIMETRY MLT: CPT

## 2022-06-24 PROCEDURE — 85027 COMPLETE CBC AUTOMATED: CPT | Performed by: NURSE PRACTITIONER

## 2022-06-24 PROCEDURE — 84100 ASSAY OF PHOSPHORUS: CPT | Performed by: HOSPITALIST

## 2022-06-24 PROCEDURE — 94760 N-INVAS EAR/PLS OXIMETRY 1: CPT

## 2022-06-24 PROCEDURE — 36415 COLL VENOUS BLD VENIPUNCTURE: CPT | Performed by: NURSE PRACTITIONER

## 2022-06-24 PROCEDURE — 94660 CPAP INITIATION&MGMT: CPT

## 2022-06-24 PROCEDURE — 25000242 PHARM REV CODE 250 ALT 637 W/ HCPCS

## 2022-06-24 PROCEDURE — 80048 BASIC METABOLIC PNL TOTAL CA: CPT | Performed by: NURSE PRACTITIONER

## 2022-06-24 PROCEDURE — 36415 COLL VENOUS BLD VENIPUNCTURE: CPT

## 2022-06-24 PROCEDURE — 82728 ASSAY OF FERRITIN: CPT

## 2022-06-24 PROCEDURE — 83615 LACTATE (LD) (LDH) ENZYME: CPT

## 2022-06-24 PROCEDURE — 36415 COLL VENOUS BLD VENIPUNCTURE: CPT | Performed by: HOSPITALIST

## 2022-06-24 PROCEDURE — 11000001 HC ACUTE MED/SURG PRIVATE ROOM

## 2022-06-24 PROCEDURE — 25000003 PHARM REV CODE 250: Performed by: INTERNAL MEDICINE

## 2022-06-24 PROCEDURE — 97535 SELF CARE MNGMENT TRAINING: CPT | Mod: CO

## 2022-06-24 PROCEDURE — 85379 FIBRIN DEGRADATION QUANT: CPT

## 2022-06-24 PROCEDURE — 63600175 PHARM REV CODE 636 W HCPCS: Performed by: NURSE PRACTITIONER

## 2022-06-24 PROCEDURE — 63600175 PHARM REV CODE 636 W HCPCS

## 2022-06-24 RX ORDER — FUROSEMIDE 10 MG/ML
40 INJECTION INTRAMUSCULAR; INTRAVENOUS
Status: DISCONTINUED | OUTPATIENT
Start: 2022-06-24 | End: 2022-06-27

## 2022-06-24 RX ORDER — MUPIROCIN 20 MG/G
OINTMENT TOPICAL 2 TIMES DAILY
Status: COMPLETED | OUTPATIENT
Start: 2022-06-24 | End: 2022-06-28

## 2022-06-24 RX ADMIN — ALBUTEROL SULFATE 2 PUFF: 90 AEROSOL, METERED RESPIRATORY (INHALATION) at 08:06

## 2022-06-24 RX ADMIN — CARVEDILOL 25 MG: 25 TABLET, FILM COATED ORAL at 08:06

## 2022-06-24 RX ADMIN — TIOTROPIUM BROMIDE 18 MCG: 18 CAPSULE ORAL; RESPIRATORY (INHALATION) at 08:06

## 2022-06-24 RX ADMIN — SODIUM BICARBONATE 1300 MG: 650 TABLET ORAL at 08:06

## 2022-06-24 RX ADMIN — THERA TABS 1 TABLET: TAB at 09:06

## 2022-06-24 RX ADMIN — OXYCODONE HYDROCHLORIDE AND ACETAMINOPHEN 500 MG: 500 TABLET ORAL at 08:06

## 2022-06-24 RX ADMIN — MONTELUKAST 10 MG: 10 TABLET, FILM COATED ORAL at 08:06

## 2022-06-24 RX ADMIN — Medication 6 MG: at 08:06

## 2022-06-24 RX ADMIN — MUPIROCIN: 20 OINTMENT TOPICAL at 10:06

## 2022-06-24 RX ADMIN — CEFEPIME 1 G: 1 INJECTION, POWDER, FOR SOLUTION INTRAMUSCULAR; INTRAVENOUS at 09:06

## 2022-06-24 RX ADMIN — SODIUM BICARBONATE 1300 MG: 650 TABLET ORAL at 09:06

## 2022-06-24 RX ADMIN — FUROSEMIDE 40 MG: 10 INJECTION, SOLUTION INTRAMUSCULAR; INTRAVENOUS at 12:06

## 2022-06-24 RX ADMIN — OXYCODONE HYDROCHLORIDE AND ACETAMINOPHEN 500 MG: 500 TABLET ORAL at 09:06

## 2022-06-24 RX ADMIN — FLUTICASONE FUROATE AND VILANTEROL TRIFENATATE 1 PUFF: 100; 25 POWDER RESPIRATORY (INHALATION) at 08:06

## 2022-06-24 RX ADMIN — GUAIFENESIN AND DEXTROMETHORPHAN HYDROBROMIDE 1 TABLET: 600; 30 TABLET, EXTENDED RELEASE ORAL at 09:06

## 2022-06-24 RX ADMIN — GUAIFENESIN AND DEXTROMETHORPHAN HYDROBROMIDE 1 TABLET: 600; 30 TABLET, EXTENDED RELEASE ORAL at 08:06

## 2022-06-24 RX ADMIN — CARVEDILOL 25 MG: 25 TABLET, FILM COATED ORAL at 09:06

## 2022-06-24 RX ADMIN — PRAVASTATIN SODIUM 40 MG: 40 TABLET ORAL at 09:06

## 2022-06-24 RX ADMIN — ALBUTEROL SULFATE 2 PUFF: 90 AEROSOL, METERED RESPIRATORY (INHALATION) at 01:06

## 2022-06-24 RX ADMIN — REMDESIVIR 100 MG: 100 INJECTION, POWDER, LYOPHILIZED, FOR SOLUTION INTRAVENOUS at 10:06

## 2022-06-24 RX ADMIN — HYDRALAZINE HYDROCHLORIDE 100 MG: 25 TABLET, FILM COATED ORAL at 09:06

## 2022-06-24 RX ADMIN — ALBUTEROL SULFATE 2 PUFF: 90 AEROSOL, METERED RESPIRATORY (INHALATION) at 12:06

## 2022-06-24 RX ADMIN — ENOXAPARIN SODIUM 60 MG: 100 INJECTION SUBCUTANEOUS at 04:06

## 2022-06-24 RX ADMIN — HYDRALAZINE HYDROCHLORIDE 100 MG: 25 TABLET, FILM COATED ORAL at 08:06

## 2022-06-24 RX ADMIN — SODIUM BICARBONATE 1300 MG: 650 TABLET ORAL at 02:06

## 2022-06-24 RX ADMIN — FUROSEMIDE 40 MG: 10 INJECTION, SOLUTION INTRAMUSCULAR; INTRAVENOUS at 11:06

## 2022-06-24 RX ADMIN — FLUTICASONE PROPIONATE 100 MCG: 50 SPRAY, METERED NASAL at 09:06

## 2022-06-24 NOTE — NURSING
"1950- Report called to nurse Neil assuming care of patient moving from  to Tele 429. Informed of assessment to complete as well as meds to pass and then transferring of patient out of unit.     2112- NP Josh informed of patient transitioned off Insulin drip today but no QHS SSI other than scheduled Levemir and set amount with meals. Ask for QHS SSI orders. See orders.     2115- Patient request RN speak with daughter Betsy as patient complaining of feeling more "weak" and "not well" today. Informed patient and family I will let NP know as well as patient transfer out of ICU to 429. WCTM.    2119- NP Josh notified of patient not feeling well today with oxygen acutely increased from 2L to 4L with coughing spell and sputum expelled. NP okay with chest XRAY being ordered and current set of vitals uploaded.    2138- Patient transferred from  to Tele 429 with patient belonging including but not limited to glasses; bilateral hearing aids, case, and ; cell phone and ; home CPAP machine and mask in bag; clothes; roseary; hospital medications and chart with two RNs, oxygen tank on 4L BNC, IV in place and saline locked, lauren secured, and mask on. Nurse Jolynn at bedside when patient placed in room 429 and care assumed. Bed alarm on. Bed in lowest position. Call light in reach. Room near nurses' station. Patient door closed. Bedside handoff complete with ADAM Neil assuming care.    "

## 2022-06-24 NOTE — PLAN OF CARE
Problem: Adult Inpatient Plan of Care  Goal: Plan of Care Review  Outcome: Ongoing, Progressing   Chart reviewed and care plan updated

## 2022-06-24 NOTE — PT/OT/SLP PROGRESS
Occupational Therapy   Treatment    Name: Myesha Quinones  MRN: [id number]  Admitting Diagnosis:  Acute respiratory failure with hypoxia       Recommendations:     Discharge Recommendations: home health OT  Discharge Equipment Recommendations:  none  Barriers to discharge:  None    Assessment:     Myesha Quinones is a 82 y.o. female with a medical diagnosis of Acute respiratory failure with hypoxia.  Performance deficits affecting function are weakness, impaired endurance, impaired functional mobilty, impaired self care skills, gait instability, impaired balance, decreased upper extremity function, decreased lower extremity function, impaired cardiopulmonary response to activity. Pt found in bed, agreeable to therapy.  Pt is Mod I with bed mobility and SBA with ambulation to and from sink w/o AD.  Pt tolerated tx fairly well secondary to easily fatigues. Pt progressing towards goals. Continue OT services to address functional goals, progressing as able.      Rehab Prognosis:  Good; patient would benefit from acute skilled OT services to address these deficits and reach maximum level of function.       Plan:     Patient to be seen 3 x/week to address the above listed problems via self-care/home management, therapeutic activities, therapeutic exercises  · Plan of Care Expires: 07/22/22  · Plan of Care Reviewed with: patient    Subjective     Pain/Comfort:  · Pain Rating 1: 0/10  · Pain Rating Post-Intervention 1: 0/10    Objective:     Communicated with: RN prior to session.  Patient found HOB elevated with telemetry, peripheral IV, oxygen, lauren catheter upon OT entry to room.    General Precautions: Standard, droplet, contact, airborne   Orthopedic Precautions:N/A   Braces: N/A  Respiratory Status: Nasal cannula, flow 4 L/min     Occupational Performance:     Bed Mobility:    · Patient completed Rolling/Turning to Left with  modified independence  · Patient completed Scooting/Bridging with modified  independence  · Patient completed Supine to Sit with modified independence, HOB elevated  · Patient completed Sit to Supine with modified independence     Functional Mobility/Transfers:  · Patient completed Sit <> Stand Transfer with stand by assistance  with  no assistive device   · Functional Mobility: Pt ambulated to and from sink with SBA w/o AD ~6 feet total.     Activities of Daily Living:  · Grooming: supervision standing at sink  · Upper Body Dressing: modified independence        [facility] 6 Click ADL: 19    Treatment & Education:  Pt provided with and reviewed Energy Conservation handout which included Efficient breathing tech.  Pt verbalizes understanding.      Patient left HOB elevated with all lines intact, call button in reach, bed alarm on and setup with lunch tray. Education:      GOALS:   Multidisciplinary Problems     Occupational Therapy Goals        Problem: Occupational Therapy    Goal Priority Disciplines Outcome Interventions   Occupational Therapy Goal     OT, PT/OT Ongoing, Progressing    Description: Goals to be met by: 7/22     Patient will increase functional independence with ADLs by performing:    UE Dressing with Modified [place].  LE Dressing with Modified [place].  Grooming while standing at sink with Modified [place].  Toileting from toilet with Modified [place] for hygiene and clothing management.   Toilet transfer to toilet with Modified [place].  Upper extremity exercise program x10 reps per handout, with independence.  Pt will demo good understanding of energy conservation/work simplification                     Time Tracking:     OT Date of Treatment: 06/24/22  OT Start Time: 1406  OT Stop Time: 1430  OT Total Time (min): 24 min    Billable Minutes:Self Care/Home Management 24               6/24/2022

## 2022-06-24 NOTE — PT/OT/SLP PROGRESS
Occupational Therapy  Visit Attempt    Patient Name:  Myesha Quinones   MRN:  [id number]    Patient not seen today secondary to Other (Comment) (9:33 - Patient had not received breakfast tray and it is now coming; family members requesting to allow patient to eat.).     6/24/2022

## 2022-06-24 NOTE — ASSESSMENT & PLAN NOTE
-Progressive dyspnea over the past two weeks  -may be in the setting of acute COPD exacerbation, ADHF, and COVID-19 infection  -Procalcitonin negative  -COVID-19 protocol initiated - On Remdesivir  -D.Dimer 2.38; may be multifactorial as stated above  -L >R LLE edema; bilateral LLE US - No DVTnoted  -Initiate Lovenox 1mg/Kg daily in setting of VIRGILIO  - Breathing treatment  - IV lasix - hold due to HHS  - Supplemental oxygen titrate as needed  - Continuous pulse ox      Patient with Hypoxic Respiratory failure which is Acute.  she is not on home oxygen. Supplemental ventilation was provided and noted- Oxygen Concentration (%):  [40] 40.   Differential diagnosis includes - CHF, COPD, Pleural effusion and COVID-19 infection, MAC, chronic Aspergillosis Labs and images were reviewed. Patient Has not has a recent ABG. Will treat underlying causes and adjust management of respiratory failure as above    - weaning oxygen  - Back on IV lasix due to volume overload

## 2022-06-24 NOTE — PLAN OF CARE
Mrs. Quinones, will be assisted and encouraged to get up to the chair at the bedside, turn every two hours for weight shift, float heels off the bed,  supported with pillows to prevent pressure areas to the skin. She will continue to work for occupational therapy for strengthening.

## 2022-06-24 NOTE — NURSING
Care plans reviewed. No c/o of SOB or chest pains, no acute distress noted. All medications given as ordered. Safety maintained, bed in lowest position, bed alarm on, bed wheels locked, call light with in reach. Will continue to monitor.

## 2022-06-24 NOTE — SUBJECTIVE & OBJECTIVE
Interval History: Reports SOB and increased cough over night. CXR shows slight worsening of pulmonary interstitial edema. Start IV lasix. Off insulin gtt and BG controlled    Review of Systems   Constitutional:  Positive for fatigue. Negative for appetite change.   HENT:  Positive for congestion.    Respiratory:  Positive for cough and shortness of breath. Negative for wheezing.    Cardiovascular:  Negative for chest pain and leg swelling.   Gastrointestinal:  Negative for nausea.   Skin:  Negative for rash.   Neurological:  Negative for dizziness and headaches.   Psychiatric/Behavioral:  Negative for confusion.    Objective:     Vital Signs (Most Recent):  Temp: 97.8 °F (36.6 °C) (06/24/22 1201)  Pulse: 78 (06/24/22 1224)  Resp: 20 (06/24/22 1224)  BP: (!) 154/66 (06/24/22 1201)  SpO2: 95 % (06/24/22 1224)   Vital Signs (24h Range):  Temp:  [96.2 °F (35.7 °C)-98.9 °F (37.2 °C)] 97.8 °F (36.6 °C)  Pulse:  [65-98] 78  Resp:  [15-26] 20  SpO2:  [93 %-99 %] 95 %  BP: (116-218)/() 154/66     Weight: 59.5 kg (131 lb 2.8 oz)  Body mass index is 23.24 kg/m².    Intake/Output Summary (Last 24 hours) at 6/24/2022 1231  Last data filed at 6/24/2022 0600  Gross per 24 hour   Intake 1098.13 ml   Output 1650 ml   Net -551.87 ml        Physical Exam  Constitutional:       General: She is awake. She is not in acute distress.     Appearance: She is not diaphoretic.   HENT:      Head: Normocephalic and atraumatic.      Nose: No congestion or rhinorrhea.      Mouth/Throat:      Mouth: Mucous membranes are moist.   Eyes:      Conjunctiva/sclera: Conjunctivae normal.   Cardiovascular:      Rate and Rhythm: Normal rate.      Heart sounds: No murmur heard.  Pulmonary:      Effort: Pulmonary effort is normal. No respiratory distress.      Breath sounds: Decreased air movement (R lung) present. Rales present. No wheezing.      Comments: 2L NC. Sats %. Congestive cough  Chest:      Chest wall: No tenderness.   Abdominal:       General: Bowel sounds are normal.      Palpations: Abdomen is soft.      Tenderness: There is no abdominal tenderness.   Genitourinary:     Comments: [place] in place  Musculoskeletal:      Cervical back: Neck supple.      Right lower leg: Edema (trace) present.      Left lower leg: Edema (trace) present.   Skin:     General: Skin is warm and dry.   Neurological:      Mental Status: She is alert and oriented to person, place, and time.   Psychiatric:         Mood and Affect: Mood normal.       Significant Labs: All pertinent labs within the past 24 hours have been reviewed.    Significant Imaging: I have reviewed all pertinent imaging results/findings within the past 24 hours.

## 2022-06-24 NOTE — ASSESSMENT & PLAN NOTE
Patient presents with shortness of breath and PELAYO. Bibasilar rales and peripheral edema on exam; L >R edema  - consistent clinical presentation;   -Troponin 0.015; likely demand; will continue to monitor and trend  -CXR revealed diffuse pulmonary edema with right small pleural effusion  - initiated lasix 40mg IV  in ED, continue at 40mg IV q12hr. Back on IV lasix for now due to volume overload in CXR  - continue home BB, will hold ARB in setting of VIRGILIO  - Daily weights  -Strict I/Os  - Monitor on telemetry  - Monitor and trend BMP, Mg, and renal function; keep K >4, Mg >2  -Sodium restriction (<2g/d), fluid restriction (<2L)   - 2D echo to assess cardiac function and valvular dysfunction.     Results for orders placed during the hospital encounter of 06/20/22    Echo    Interpretation Summary  · The left ventricle is normal in size with concentric hypertrophy and normal systolic function.  · The estimated ejection fraction is 55%.  · Grade II left ventricular diastolic dysfunction.  · Normal right ventricular size with normal right ventricular systolic function.  · Severe left atrial enlargement.  · Mild mitral regurgitation.  · Mild to moderate tricuspid regurgitation.  · Intermediate central venous pressure (8 mmHg).  · The estimated PA systolic pressure is 56 mmHg.  · There is pulmonary hypertension.  · There is a left pleural effusion.

## 2022-06-24 NOTE — DISCHARGE INSTRUCTIONS
[facility]    We are committed to providing you the best follow-up care after discharge from the hospital. You have a scheduled hospital follow up appointment with the [facility]. Our team will review your hospital diagnosis, treatment and medications. We coordinate care with your Primary Care Provider. We plan to follow you closely for approximately 30-60 days from your hospital discharge date. Your first appointment will be an hour long and each additional appointment is thirty minutes. You will see our Internal Medicine physician, Dr. Ev Jiménez. Please contact us for any medically related problems, including change in your health status, and medication issues; we will make an effort to address these needs expeditiously.    [phone]

## 2022-06-24 NOTE — TREATMENT PLAN
[facility] nurse gave PCC folder with clinic info and appointment reminder to bedside nurse to give to patient. PCC info added to pt's AVS.    Future Appointments   Date Time Provider Department Center   6/28/2022  8:10 AM Steffen Osborn MD [facility] UROLOGY [facility]   6/29/2022 10:00 AM Ev Jiménez MD [facility] IMPRI [facility]   7/7/2022 11:00 AM CARRIE Arechiga MD [facility] OPHTHAL [facility]   7/11/2022  2:00 PM Leonides Fuentes MD [facility] DERM [facility]   7/12/2022 10:00 AM [facility] CT1 LIMIT 450 LBS [facility] CT SCAN [facility]   7/26/2022 11:00 AM Catrachita Rothman MD [facility] IM [facility] PCW   8/6/2022 10:00 AM HOME MONITOR DEVICE CHECK, [facility] ARRHPRO [facility]   8/23/2022  9:45 AM Mansi Borrero MD [facility]   8/25/2022 11:30 AM Diaz Roche MD [facility] DERMSUR [facility]   9/1/2022  9:00 AM Eric Rivera PA-C [facility] UROGYN [facility]   9/6/2022  1:30 PM Seng Salgado MD [facility] CARDIO [place]   9/26/2022  8:15 AM LAB, [facility] [place]   10/3/2022  9:00 AM Elidia Madison NP [facility] [place]

## 2022-06-24 NOTE — PLAN OF CARE
Problem: Occupational Therapy  Goal: Occupational Therapy Goal  Description: Goals to be met by: 7/22     Patient will increase functional independence with ADLs by performing:    UE Dressing with Modified [facility].  LE Dressing with Modified [facility].  Grooming while standing at sink with Modified [facility].  Toileting from toilet with Modified [facility] for hygiene and clothing management.   Toilet transfer to toilet with Modified [facility].  Upper extremity exercise program x10 reps per handout, with independence.  Pt will demo good understanding of energy conservation/work simplification    Outcome: Ongoing, Progressing   Myesha Quinones is a 82 y.o. female with a medical diagnosis of Acute respiratory failure with hypoxia.  Performance deficits affecting function are weakness, impaired endurance, impaired functional mobilty, impaired self care skills, gait instability, impaired balance, decreased upper extremity function, decreased lower extremity function, impaired cardiopulmonary response to activity. Pt found in bed, agreeable to therapy.  Pt is Mod I with bed mobility and SBA with ambulation to and from sink w/o AD.  Pt tolerated tx fairly well secondary to easily fatigues. Pt progressing towards goals. Continue OT services to address functional goals, progressing as able.

## 2022-06-24 NOTE — PROGRESS NOTES
"[facility] Medicine  Progress Note    Patient Name: Myesha Quinones  MRN: [id number]  Patient Class: IP- Inpatient   Admission Date: 6/20/2022  Length of Stay: 2 days  Attending Physician: Yrn Sheppard MD  Primary Care Provider: Catrachita Rothman MD        Subjective:     Principal Problem:Acute respiratory failure with hypoxia        HPI:  Myesha Quinones has a past medical history of CAD, combined systolic and diastolic heart failure, LBBB, biventricular ICD, CKD4, IDDM, breast cancer, HTN, HLD, severe persistent asthma, COPD, and HLD presents to [facility] ED with complaints of SOB. She states her SOB is associated with a productive cough. She states her SOB has been progressively worsened over the past two weeks. She reports she was seen by her pulmonologist and was prescribed antibiotic and prednisone which she started last Tuesday. She states she did not complete her antibiotics or steroid therapy. She denies fever, chest pain, palpitations, N/V, or diarrhea.     She was seen by her pulmonolgist, Dr. Maki on 6/13/22 for shortness of breath. Reported cough and congestion attributing to sinus infection. She was also noted to have a current MAC infection which she is not on therapy. She was started on 10 day course of Augmentin with 10mg PO prednisone to accompany.     Of Note: She was primarily followed by ID, Dr. Amaya, for ABPA (allergic bronchopulmonary aspergillosis) and was started on Voriconazole therapy in which she did not tolerate.       In the ED: BP (!) 165/107   Pulse 83   Temp 98 °F (36.7 °C) (Axillary)   Resp (!) 22   Ht 5' 3" (1.6 m)   Wt 59 kg (130 lb)   LMP  (LMP Unknown)   SpO2 (!) 94%   BMI 23.03 kg/m² Labs revealed WBC 13.35, H/H 9/27.8, BUN 64, creatinine 2.8, Glucose 161, . Troponin 0.015. 12 lead EKG revealed Normal sinus rhythm, Left axis deviation, Right bundle branch block. She is Covid positive. CXR revealed diffuse pulmonary edema with a right " small pleural effusion. She received Duo-neb tx x1, Lasix 40mg IV x1, and Prednisone 40mg PO x1. ID consulted to follow.         Overview/Hospital Course:  No notes on file    Interval History: Reports SOB and increased cough over night. CXR shows slight worsening of pulmonary interstitial edema. Start IV lasix. Off insulin gtt and BG controlled    Review of Systems   Constitutional:  Positive for fatigue. Negative for appetite change.   HENT:  Positive for congestion.    Respiratory:  Positive for cough and shortness of breath. Negative for wheezing.    Cardiovascular:  Negative for chest pain and leg swelling.   Gastrointestinal:  Negative for nausea.   Skin:  Negative for rash.   Neurological:  Negative for dizziness and headaches.   Psychiatric/Behavioral:  Negative for confusion.    Objective:     Vital Signs (Most Recent):  Temp: 97.8 °F (36.6 °C) (06/24/22 1201)  Pulse: 78 (06/24/22 1224)  Resp: 20 (06/24/22 1224)  BP: (!) 154/66 (06/24/22 1201)  SpO2: 95 % (06/24/22 1224)   Vital Signs (24h Range):  Temp:  [96.2 °F (35.7 °C)-98.9 °F (37.2 °C)] 97.8 °F (36.6 °C)  Pulse:  [65-98] 78  Resp:  [15-26] 20  SpO2:  [93 %-99 %] 95 %  BP: (116-218)/() 154/66     Weight: 59.5 kg (131 lb 2.8 oz)  Body mass index is 23.24 kg/m².    Intake/Output Summary (Last 24 hours) at 6/24/2022 1231  Last data filed at 6/24/2022 0600  Gross per 24 hour   Intake 1098.13 ml   Output 1650 ml   Net -551.87 ml        Physical Exam  Constitutional:       General: She is awake. She is not in acute distress.     Appearance: She is not diaphoretic.   HENT:      Head: Normocephalic and atraumatic.      Nose: No congestion or rhinorrhea.      Mouth/Throat:      Mouth: Mucous membranes are moist.   Eyes:      Conjunctiva/sclera: Conjunctivae normal.   Cardiovascular:      Rate and Rhythm: Normal rate.      Heart sounds: No murmur heard.  Pulmonary:      Effort: Pulmonary effort is normal. No respiratory distress.      Breath sounds:  Decreased air movement (R lung) present. Rales present. No wheezing.      Comments: 2L NC. Sats %. Congestive cough  Chest:      Chest wall: No tenderness.   Abdominal:      General: Bowel sounds are normal.      Palpations: Abdomen is soft.      Tenderness: There is no abdominal tenderness.   Genitourinary:     Comments: De La Cruz in place  Musculoskeletal:      Cervical back: Neck supple.      Right lower leg: Edema (trace) present.      Left lower leg: Edema (trace) present.   Skin:     General: Skin is warm and dry.   Neurological:      Mental Status: She is alert and oriented to person, place, and time.   Psychiatric:         Mood and Affect: Mood normal.       Significant Labs: All pertinent labs within the past 24 hours have been reviewed.    Significant Imaging: I have reviewed all pertinent imaging results/findings within the past 24 hours.      Assessment/Plan:      * Acute respiratory failure with hypoxia  -Progressive dyspnea over the past two weeks  -may be in the setting of acute COPD exacerbation, ADHF, and COVID-19 infection  -Procalcitonin negative  -COVID-19 protocol initiated - On Remdesivir  -D.Dimer 2.38; may be multifactorial as stated above  -L >R LLE edema; bilateral LLE US - No DVTnoted  -Initiate Lovenox 1mg/Kg daily in setting of VIRGILIO  - Breathing treatment  - IV lasix - hold due to HHS  - Supplemental oxygen titrate as needed  - Continuous pulse ox      Patient with Hypoxic Respiratory failure which is Acute.  she is not on home oxygen. Supplemental ventilation was provided and noted- Oxygen Concentration (%):  [40] 40.   Differential diagnosis includes - CHF, COPD, Pleural effusion and COVID-19 infection, MAC, chronic Aspergillosis Labs and images were reviewed. Patient Has not has a recent ABG. Will treat underlying causes and adjust management of respiratory failure as above    - weaning oxygen  - Back on IV lasix due to volume overload    Hyperosmolar hyperglycemic state  (HHS)  -Patient is with evidence of HHS given hyperglycemia, elevated anion gap metabolic acidosis, glucosuria,  -Etiology likely 2/2 high dose steroid use  - Received IVF bolus 1L over night  - Received regular insulin bolus and started on weight-based insulin infusion until the anion-gap returns to normal and blood glucose is stabilized.    -Hourly AccuCheks  -check BMPs every 4 hours. Will replace potassium as necessary.    -Will start basal-bolus insulin therapy thereafter as well as an oral diet.    -Resolved      Acute on chronic combined systolic and diastolic CHF (congestive heart failure)  Patient presents with shortness of breath and PELAYO. Bibasilar rales and peripheral edema on exam; L >R edema  - consistent clinical presentation;   -Troponin 0.015; likely demand; will continue to monitor and trend  -CXR revealed diffuse pulmonary edema with right small pleural effusion  - initiated lasix 40mg IV  in ED, continue at 40mg IV q12hr. Back on IV lasix for now due to volume overload in CXR  - continue home BB, will hold ARB in setting of VIRGILIO  - Daily weights  -Strict I/Os  - Monitor on telemetry  - Monitor and trend BMP, Mg, and renal function; keep K >4, Mg >2  -Sodium restriction (<2g/d), fluid restriction (<2L)   - 2D echo to assess cardiac function and valvular dysfunction.     Results for orders placed during the hospital encounter of 06/20/22    Echo    Interpretation Summary  · The left ventricle is normal in size with concentric hypertrophy and normal systolic function.  · The estimated ejection fraction is 55%.  · Grade II left ventricular diastolic dysfunction.  · Normal right ventricular size with normal right ventricular systolic function.  · Severe left atrial enlargement.  · Mild mitral regurgitation.  · Mild to moderate tricuspid regurgitation.  · Intermediate central venous pressure (8 mmHg).  · The estimated PA systolic pressure is 56 mmHg.  · There is pulmonary hypertension.  · There is  a left pleural effusion.                COVID-19 virus infection  - COVID positive 6/20/22; initiated on protocol  - Isolation: Airborne/Droplet. Surgical mask on patient. Notify Infection Control  - CXR with diffuse pulmonary edema with small right pleural effusion, requiring O2  - Monitor on Telemetry  -Received 40mg PO Prednisone x1 in ED   - initiated on remdesivir x5d; daily CMP  - CRP pending; D-dimer 2.38  -Covid Labs pending  - Order RT consult via Respiratory Communication for COVID Protocols  - Incentive Spirometer Q4h to promote lung compliance   - Continuous Pulse Oximetry, goal SpO2 92-96%  - supplemental O2 PRN, will wean as tolerated  - Albuterol INH Q6h scheduled & PRN   - MVI & ascorbic acid 500mg PO BID  -Anti-tussives for cough  - Acetaminophen Q6hr PRN fever/headache  - Loperamide PRN viral diarrhea  - VTE PPx: enoxaparin 1mg/kg q24h in setting of VIRGILIO  - PT/OT for debility/weakness  - If deterioration, may warrant trial of NIPPV in neg pressure room or immediate ICU consult  - Add dexamethasone - Stopped due to elevated BG with HHS and respiratory-wise clinically improving        Chronic obstructive pulmonary disease with acute exacerbation  Clinically-stable.  Afebrile. Elevation of WBC, Productive Cough.    --Received Duo-neb x1 and 40mg PO Prednisone x1 in ED  --Will continue home regimen of maintenance inhalers  -resume home montelukast and Flonase   --Albuterol INH q6 scheduled in setting of COVID-19 infection  --PRN Oxygen per Nasal Cannula for SpO2 <90%  --Pulse-Ox Monitoring every 4 hours  --Monitor respiratory status closely        Acute kidney injury superimposed on chronic kidney disease  Estimated Creatinine Clearance: 16 mL/min (A) (based on SCr of 2.2 mg/dL (H)).  Baseline Cr: 2.1Baseline BUN:46   Recent Labs   Lab 06/21/22  0232 06/21/22  2042 06/22/22  0818 06/22/22  1249 06/23/22  0757 06/23/22  1233 06/23/22  1614 06/24/22  0414   CO2 25   < > 20*   < > 24 24 23 22*   BUN 65*    < > 83*   < > 94* 93* 92* 87*   CREATININE 2.6*   < > 3.1*   < > 2.5* 2.4* 2.5* 2.2*   MG 2.2  --   --   --   --   --   --   --    PHOS  --   --  5.9*  --  5.4*  --   --  5.3*    < > = values in this interval not displayed.       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 Will hold diuresis with IV Lasix  -Monitor strict urine output  - Continue to monitor renal function with daily labs and trend electrolytes  - renally dose medications  - avoid nephrotoxic agents including NSAIDs, aminoglycosides, IV contrast (unless absolutely necessary), gadolinium, fleets and other phosphorous-based laxatives  - monitor events that may lead to decreased renal perfusion (hypovolemia, hypotension, sepsis)  - monitor urine output to assure that no obstruction precipitates worsening in GFR    - improving        UTI (urinary tract infection)  Presenting with urinary retention. UA in ED with leukocytes. Urine culture 6/1/22 prior admission grew Acinetobacter Baumannii  > 100,000 cfu/ml  - Off Cipro due to prolong QTc. Now on Cefepime for pneumonia  - F/u urine culture - No growth      Type 2 diabetes mellitus, with long-term current use of insulin  Hemoglobin A1C   Date Value Ref Range Status   02/22/2022 5.9 (H) 4.0 - 5.6 % Final     -Serum glucose on admit 161  - hold home oral medications  - LDSSI with ACCUcheck ACHS  - Diabetic diet  -Hypoglycemia protocol PRN  -Monitor closely and adjust insulin regimen as clinically indicated to achieve levels of 140-180 during hospitalization        Essential hypertension  -Resume home BB and hydralazine; hold ARB in setting of VIRGILIO  -Monitor and trend vital signs q4hr   -Anti-hypertensives PRN for SBP >180      Mycobacterium avium complex  See above      ABPA (allergic bronchopulmonary aspergillosis)  Mycobacterium avium complex    -Followed by ID, Dr. Amaya; was placed on Voriconazole therapy but unable to tolerate  -Presents to ED with  "worsening SOB not improved with recent trial of Augmentin and Prednisone  -Procalcitonin normal  -ID consulted to evaluate and assist in antibiotic administration. Per ID "Pt can be colonized with pseudomonas- had recurrent PsA in past in sputum- but can consider 5 day couse but not more than 1gm IV daily since Cr Cl was 13.9"  -Start Cefepime 1 g q24h for 5 days      Urinary retention  Hx with urinary retention due to prolapse required lauren in the past. Bladder scanned for 1240 on admission  - Lauren placed in ED  - Continue  Lauren        Hyperlipidemia associated with type 2 diabetes mellitus  Last LDL was   Lab Results   Component Value Date    LDLCALC 29.8 (L) 04/03/2022      Patient is chronically on statin.will continue for now.   Monitor clinically.          Dyspnea  -Progressive dyspnea over the past two weeks  -may be in the setting of acute COPD exacerbation, ADHF, and COVID-19 infection  -Procalcitonin pending  -COVID-19 protocol initiated  -D.Dimer 2.38; may be multifactorial as stated above  -L >R LLE edema; bilateral LLE US - No DVTnoted  -Will initiate Lovenox 1mg/Kg daily in setting of VIRGILIO      Biventricular ICD (implantable cardioverter-defibrillator) in place  -Noted        VTE Risk Mitigation (From admission, onward)         Ordered     enoxaparin injection 60 mg  Every 24 hours (non-standard times)         06/20/22 1608     IP VTE HIGH RISK PATIENT  Once         06/20/22 1603     Place sequential compression device  Until discontinued         06/20/22 1603                Discharge Planning   ALIX: 6/22/2022     Code Status: Full Code   Is the patient medically ready for discharge?:     Reason for patient still in hospital (select all that apply): Patient trending condition and Treatment  Discharge Plan A: Home Health   Discharge Delays: None known at this time              Malissa Gray NP  Department of Hospital Medicine   [place] - Telemetry  "

## 2022-06-24 NOTE — ASSESSMENT & PLAN NOTE
Estimated Creatinine Clearance: 16 mL/min (A) (based on SCr of 2.2 mg/dL (H)).  Baseline Cr: 2.1Baseline BUN:46   Recent Labs   Lab 06/21/22  0232 06/21/22  2042 06/22/22  0818 06/22/22  1249 06/23/22  0757 06/23/22  1233 06/23/22  1614 06/24/22  0414   CO2 25   < > 20*   < > 24 24 23 22*   BUN 65*   < > 83*   < > 94* 93* 92* 87*   CREATININE 2.6*   < > 3.1*   < > 2.5* 2.4* 2.5* 2.2*   MG 2.2  --   --   --   --   --   --   --    PHOS  --   --  5.9*  --  5.4*  --   --  5.3*    < > = values in this interval not displayed.       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 Will hold diuresis with IV Lasix  -Monitor strict urine output  - Continue to monitor renal function with daily labs and trend electrolytes  - renally dose medications  - avoid nephrotoxic agents including NSAIDs, aminoglycosides, IV contrast (unless absolutely necessary), gadolinium, fleets and other phosphorous-based laxatives  - monitor events that may lead to decreased renal perfusion (hypovolemia, hypotension, sepsis)  - monitor urine output to assure that no obstruction precipitates worsening in GFR    - improving

## 2022-06-24 NOTE — CONSULTS
[facility]  Hospital Medicine  Telemedicine Consult Note       [facility] Medicine consulted for Myesha Quinones to be followed through telemedicine modalities.  The patient is currently not appropriate for virtual visits as patient appears too unstable for VHM at this time. Pt with respiratory failure, volume overload, but diuresis held given VIRGILIO and HHS. Pt will need daily volume status exams.   Please re-consult once the patient is appropriate for virtual visits.      Germaine Elkins MD  Hospital Medicine Staff

## 2022-06-25 LAB
ANION GAP SERPL CALC-SCNC: 15 MMOL/L (ref 8–16)
BUN SERPL-MCNC: 75 MG/DL (ref 8–23)
CALCIUM SERPL-MCNC: 8 MG/DL (ref 8.7–10.5)
CHLORIDE SERPL-SCNC: 96 MMOL/L (ref 95–110)
CO2 SERPL-SCNC: 25 MMOL/L (ref 23–29)
CREAT SERPL-MCNC: 2.2 MG/DL (ref 0.5–1.4)
ERYTHROCYTE [DISTWIDTH] IN BLOOD BY AUTOMATED COUNT: 14.1 % (ref 11.5–14.5)
EST. GFR  (AFRICAN AMERICAN): 23 ML/MIN/1.73 M^2
EST. GFR  (NON AFRICAN AMERICAN): 20 ML/MIN/1.73 M^2
GLUCOSE SERPL-MCNC: 198 MG/DL (ref 70–110)
HCT VFR BLD AUTO: 25.3 % (ref 37–48.5)
HGB BLD-MCNC: 8 G/DL (ref 12–16)
MCH RBC QN AUTO: 28 PG (ref 27–31)
MCHC RBC AUTO-ENTMCNC: 31.6 G/DL (ref 32–36)
MCV RBC AUTO: 89 FL (ref 82–98)
PHOSPHATE SERPL-MCNC: 5 MG/DL (ref 2.7–4.5)
PLATELET # BLD AUTO: 233 K/UL (ref 150–450)
PMV BLD AUTO: 10.6 FL (ref 9.2–12.9)
POCT GLUCOSE: 193 MG/DL (ref 70–110)
POCT GLUCOSE: 217 MG/DL (ref 70–110)
POCT GLUCOSE: 263 MG/DL (ref 70–110)
POCT GLUCOSE: 355 MG/DL (ref 70–110)
POTASSIUM SERPL-SCNC: 3.9 MMOL/L (ref 3.5–5.1)
RBC # BLD AUTO: 2.86 M/UL (ref 4–5.4)
SODIUM SERPL-SCNC: 136 MMOL/L (ref 136–145)
WBC # BLD AUTO: 10.35 K/UL (ref 3.9–12.7)

## 2022-06-25 PROCEDURE — 94660 CPAP INITIATION&MGMT: CPT

## 2022-06-25 PROCEDURE — 36415 COLL VENOUS BLD VENIPUNCTURE: CPT | Performed by: HOSPITALIST

## 2022-06-25 PROCEDURE — 94640 AIRWAY INHALATION TREATMENT: CPT

## 2022-06-25 PROCEDURE — 25000003 PHARM REV CODE 250: Performed by: NURSE PRACTITIONER

## 2022-06-25 PROCEDURE — 99900035 HC TECH TIME PER 15 MIN (STAT)

## 2022-06-25 PROCEDURE — 25000003 PHARM REV CODE 250

## 2022-06-25 PROCEDURE — 36415 COLL VENOUS BLD VENIPUNCTURE: CPT | Performed by: NURSE PRACTITIONER

## 2022-06-25 PROCEDURE — 84100 ASSAY OF PHOSPHORUS: CPT | Performed by: HOSPITALIST

## 2022-06-25 PROCEDURE — 85027 COMPLETE CBC AUTOMATED: CPT | Performed by: NURSE PRACTITIONER

## 2022-06-25 PROCEDURE — 27000221 HC OXYGEN, UP TO 24 HOURS

## 2022-06-25 PROCEDURE — 63600175 PHARM REV CODE 636 W HCPCS

## 2022-06-25 PROCEDURE — 63600175 PHARM REV CODE 636 W HCPCS: Performed by: NURSE PRACTITIONER

## 2022-06-25 PROCEDURE — 27000207 HC ISOLATION

## 2022-06-25 PROCEDURE — 11000001 HC ACUTE MED/SURG PRIVATE ROOM

## 2022-06-25 PROCEDURE — 94761 N-INVAS EAR/PLS OXIMETRY MLT: CPT

## 2022-06-25 PROCEDURE — 94760 N-INVAS EAR/PLS OXIMETRY 1: CPT

## 2022-06-25 PROCEDURE — 80048 BASIC METABOLIC PNL TOTAL CA: CPT | Performed by: NURSE PRACTITIONER

## 2022-06-25 RX ADMIN — TIOTROPIUM BROMIDE 18 MCG: 18 CAPSULE ORAL; RESPIRATORY (INHALATION) at 02:06

## 2022-06-25 RX ADMIN — HYDRALAZINE HYDROCHLORIDE 100 MG: 25 TABLET, FILM COATED ORAL at 09:06

## 2022-06-25 RX ADMIN — GUAIFENESIN AND DEXTROMETHORPHAN HYDROBROMIDE 1 TABLET: 600; 30 TABLET, EXTENDED RELEASE ORAL at 08:06

## 2022-06-25 RX ADMIN — SODIUM BICARBONATE 1300 MG: 650 TABLET ORAL at 03:06

## 2022-06-25 RX ADMIN — ALBUTEROL SULFATE 2 PUFF: 90 AEROSOL, METERED RESPIRATORY (INHALATION) at 02:06

## 2022-06-25 RX ADMIN — INSULIN ASPART 2 UNITS: 100 INJECTION, SOLUTION INTRAVENOUS; SUBCUTANEOUS at 04:06

## 2022-06-25 RX ADMIN — ACETAMINOPHEN 650 MG: 325 TABLET ORAL at 11:06

## 2022-06-25 RX ADMIN — CARVEDILOL 25 MG: 25 TABLET, FILM COATED ORAL at 09:06

## 2022-06-25 RX ADMIN — INSULIN ASPART 1 UNITS: 100 INJECTION, SOLUTION INTRAVENOUS; SUBCUTANEOUS at 08:06

## 2022-06-25 RX ADMIN — SODIUM BICARBONATE 1300 MG: 650 TABLET ORAL at 08:06

## 2022-06-25 RX ADMIN — GUAIFENESIN AND DEXTROMETHORPHAN HYDROBROMIDE 1 TABLET: 600; 30 TABLET, EXTENDED RELEASE ORAL at 09:06

## 2022-06-25 RX ADMIN — FLUTICASONE PROPIONATE 100 MCG: 50 SPRAY, METERED NASAL at 09:06

## 2022-06-25 RX ADMIN — OXYCODONE HYDROCHLORIDE AND ACETAMINOPHEN 500 MG: 500 TABLET ORAL at 09:06

## 2022-06-25 RX ADMIN — SODIUM BICARBONATE 1300 MG: 650 TABLET ORAL at 09:06

## 2022-06-25 RX ADMIN — ENOXAPARIN SODIUM 60 MG: 100 INJECTION SUBCUTANEOUS at 04:06

## 2022-06-25 RX ADMIN — ALBUTEROL SULFATE 2 PUFF: 90 AEROSOL, METERED RESPIRATORY (INHALATION) at 08:06

## 2022-06-25 RX ADMIN — ACETAMINOPHEN 650 MG: 325 TABLET ORAL at 10:06

## 2022-06-25 RX ADMIN — MUPIROCIN: 20 OINTMENT TOPICAL at 08:06

## 2022-06-25 RX ADMIN — OXYCODONE HYDROCHLORIDE AND ACETAMINOPHEN 500 MG: 500 TABLET ORAL at 08:06

## 2022-06-25 RX ADMIN — HYDRALAZINE HYDROCHLORIDE 100 MG: 25 TABLET, FILM COATED ORAL at 08:06

## 2022-06-25 RX ADMIN — FLUTICASONE FUROATE AND VILANTEROL TRIFENATATE 1 PUFF: 100; 25 POWDER RESPIRATORY (INHALATION) at 02:06

## 2022-06-25 RX ADMIN — MONTELUKAST 10 MG: 10 TABLET, FILM COATED ORAL at 08:06

## 2022-06-25 RX ADMIN — FUROSEMIDE 40 MG: 10 INJECTION, SOLUTION INTRAMUSCULAR; INTRAVENOUS at 11:06

## 2022-06-25 RX ADMIN — PRAVASTATIN SODIUM 40 MG: 40 TABLET ORAL at 09:06

## 2022-06-25 RX ADMIN — MUPIROCIN: 20 OINTMENT TOPICAL at 09:06

## 2022-06-25 RX ADMIN — ALBUTEROL SULFATE 2 PUFF: 90 AEROSOL, METERED RESPIRATORY (INHALATION) at 01:06

## 2022-06-25 RX ADMIN — THERA TABS 1 TABLET: TAB at 09:06

## 2022-06-25 RX ADMIN — INSULIN ASPART 5 UNITS: 100 INJECTION, SOLUTION INTRAVENOUS; SUBCUTANEOUS at 11:06

## 2022-06-25 RX ADMIN — CARVEDILOL 25 MG: 25 TABLET, FILM COATED ORAL at 08:06

## 2022-06-25 NOTE — ASSESSMENT & PLAN NOTE
Estimated Creatinine Clearance: 16 mL/min (A) (based on SCr of 2.2 mg/dL (H)).  Baseline Cr: 2.1Baseline BUN:46   Recent Labs   Lab 06/21/22  0232 06/21/22  2042 06/23/22  0757 06/23/22  1233 06/23/22  1614 06/24/22  0414 06/25/22  0451 06/25/22  0820   CO2 25   < > 24   < > 23 22* 25  --    BUN 65*   < > 94*   < > 92* 87* 75*  --    CREATININE 2.6*   < > 2.5*   < > 2.5* 2.2* 2.2*  --    MG 2.2  --   --   --   --   --   --   --    PHOS  --    < > 5.4*  --   --  5.3*  --  5.0*    < > = values in this interval not displayed.       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 Will hold diuresis with IV Lasix  -Monitor strict urine output  - Continue to monitor renal function with daily labs and trend electrolytes  - renally dose medications  - avoid nephrotoxic agents including NSAIDs, aminoglycosides, IV contrast (unless absolutely necessary), gadolinium, fleets and other phosphorous-based laxatives  - monitor events that may lead to decreased renal perfusion (hypovolemia, hypotension, sepsis)  - monitor urine output to assure that no obstruction precipitates worsening in GFR    - improving

## 2022-06-25 NOTE — ASSESSMENT & PLAN NOTE
Hx with urinary retention due to prolapse required lauren in the past. Bladder scanned for 1240 on admission  - Lauren placed in ED  - Continue  Lauren for now  - Plan to remove Lauren for voiding trials

## 2022-06-25 NOTE — NURSING
Care plans reviewed. No c/o of SOB or chest pains, no acute distress noted. Vitals WNL. All medications given as ordered. Safety maintained, bed in lowest position, bed alarm on, bed wheels locked, call light with in reach. Will continue to monitor.

## 2022-06-25 NOTE — SUBJECTIVE & OBJECTIVE
Interval History: Reports still with SOB and increased cough and fatigue over night.  On 3L NC now. On IV lasix. Off insulin gtt and BG controlled    Review of Systems   Constitutional:  Positive for fatigue. Negative for appetite change.   HENT:  Positive for congestion.    Respiratory:  Positive for cough and shortness of breath. Negative for wheezing.    Cardiovascular:  Negative for chest pain and leg swelling.   Gastrointestinal:  Negative for nausea.   Skin:  Negative for rash.   Neurological:  Negative for dizziness and headaches.   Psychiatric/Behavioral:  Negative for confusion.    Objective:     Vital Signs (Most Recent):  Temp: 98 °F (36.7 °C) (06/25/22 1748)  Pulse: 69 (06/25/22 1748)  Resp: 18 (06/25/22 1748)  BP: (!) 185/79 (06/25/22 1748)  SpO2: 97 % (06/25/22 1748)   Vital Signs (24h Range):  Temp:  [96.3 °F (35.7 °C)-98 °F (36.7 °C)] 98 °F (36.7 °C)  Pulse:  [69-90] 69  Resp:  [16-20] 18  SpO2:  [94 %-98 %] 97 %  BP: (142-185)/(65-88) 185/79     Weight: 59.5 kg (131 lb 2.8 oz)  Body mass index is 23.24 kg/m².    Intake/Output Summary (Last 24 hours) at 6/25/2022 1805  Last data filed at 6/25/2022 1700  Gross per 24 hour   Intake 270 ml   Output 4700 ml   Net -4430 ml        Physical Exam  Constitutional:       General: She is awake. She is not in acute distress.     Appearance: She is not diaphoretic.   HENT:      Head: Normocephalic and atraumatic.      Nose: No congestion or rhinorrhea.      Mouth/Throat:      Mouth: Mucous membranes are moist.   Eyes:      Conjunctiva/sclera: Conjunctivae normal.   Cardiovascular:      Rate and Rhythm: Normal rate.      Heart sounds: No murmur heard.  Pulmonary:      Effort: Pulmonary effort is normal. No respiratory distress.      Breath sounds: Decreased air movement (R lung) present. Rales present. No wheezing.      Comments: 3L NC. Sats 94-97%. Congestive cough  Chest:      Chest wall: No tenderness.   Abdominal:      General: Bowel sounds are normal.       Palpations: Abdomen is soft.      Tenderness: There is no abdominal tenderness.   Genitourinary:     Comments: De La Cruz in place  Musculoskeletal:      Cervical back: Neck supple.      Right lower leg: Edema (trace) present.      Left lower leg: Edema (trace) present.   Skin:     General: Skin is warm and dry.   Neurological:      Mental Status: She is alert and oriented to person, place, and time.   Psychiatric:         Mood and Affect: Mood normal.       Significant Labs: All pertinent labs within the past 24 hours have been reviewed.    Significant Imaging: I have reviewed all pertinent imaging results/findings within the past 24 hours.

## 2022-06-25 NOTE — PLAN OF CARE
Vnnote: labs, notes, orders, care plan review, will be available as needed.   Problem: Adult Inpatient Plan of Care  Goal: Plan of Care Review  Outcome: Ongoing, Progressing

## 2022-06-25 NOTE — PROGRESS NOTES
"[facility] Medicine  Progress Note    Patient Name: Myesha Quinones  MRN: [id number]  Patient Class: IP- Inpatient   Admission Date: 6/20/2022  Length of Stay: 3 days  Attending Physician: Yrn Sheppard MD  Primary Care Provider: Catrachita Rothman MD        Subjective:     Principal Problem:Acute respiratory failure with hypoxia        HPI:  Myesha Quinones has a past medical history of CAD, combined systolic and diastolic heart failure, LBBB, biventricular ICD, CKD4, IDDM, breast cancer, HTN, HLD, severe persistent asthma, COPD, and HLD presents to [facility] ED with complaints of SOB. She states her SOB is associated with a productive cough. She states her SOB has been progressively worsened over the past two weeks. She reports she was seen by her pulmonologist and was prescribed antibiotic and prednisone which she started last Tuesday. She states she did not complete her antibiotics or steroid therapy. She denies fever, chest pain, palpitations, N/V, or diarrhea.     She was seen by her pulmonolgist, Dr. Maki on 6/13/22 for shortness of breath. Reported cough and congestion attributing to sinus infection. She was also noted to have a current MAC infection which she is not on therapy. She was started on 10 day course of Augmentin with 10mg PO prednisone to accompany.     Of Note: She was primarily followed by ID, Dr. Amaya, for ABPA (allergic bronchopulmonary aspergillosis) and was started on Voriconazole therapy in which she did not tolerate.       In the ED: BP (!) 165/107   Pulse 83   Temp 98 °F (36.7 °C) (Axillary)   Resp (!) 22   Ht 5' 3" (1.6 m)   Wt 59 kg (130 lb)   LMP  (LMP Unknown)   SpO2 (!) 94%   BMI 23.03 kg/m² Labs revealed WBC 13.35, H/H 9/27.8, BUN 64, creatinine 2.8, Glucose 161, . Troponin 0.015. 12 lead EKG revealed Normal sinus rhythm, Left axis deviation, Right bundle branch block. She is Covid positive. CXR revealed diffuse pulmonary edema with a right " small pleural effusion. She received Duo-neb tx x1, Lasix 40mg IV x1, and Prednisone 40mg PO x1. ID consulted to follow.         Overview/Hospital Course:  No notes on file    Interval History: Reports still with SOB and increased cough and fatigue over night.  On 3L NC now. On IV lasix. Off insulin gtt and BG controlled    Review of Systems   Constitutional:  Positive for fatigue. Negative for appetite change.   HENT:  Positive for congestion.    Respiratory:  Positive for cough and shortness of breath. Negative for wheezing.    Cardiovascular:  Negative for chest pain and leg swelling.   Gastrointestinal:  Negative for nausea.   Skin:  Negative for rash.   Neurological:  Negative for dizziness and headaches.   Psychiatric/Behavioral:  Negative for confusion.    Objective:     Vital Signs (Most Recent):  Temp: 98 °F (36.7 °C) (06/25/22 1748)  Pulse: 69 (06/25/22 1748)  Resp: 18 (06/25/22 1748)  BP: (!) 185/79 (06/25/22 1748)  SpO2: 97 % (06/25/22 1748)   Vital Signs (24h Range):  Temp:  [96.3 °F (35.7 °C)-98 °F (36.7 °C)] 98 °F (36.7 °C)  Pulse:  [69-90] 69  Resp:  [16-20] 18  SpO2:  [94 %-98 %] 97 %  BP: (142-185)/(65-88) 185/79     Weight: 59.5 kg (131 lb 2.8 oz)  Body mass index is 23.24 kg/m².    Intake/Output Summary (Last 24 hours) at 6/25/2022 1805  Last data filed at 6/25/2022 1700  Gross per 24 hour   Intake 270 ml   Output 4700 ml   Net -4430 ml        Physical Exam  Constitutional:       General: She is awake. She is not in acute distress.     Appearance: She is not diaphoretic.   HENT:      Head: Normocephalic and atraumatic.      Nose: No congestion or rhinorrhea.      Mouth/Throat:      Mouth: Mucous membranes are moist.   Eyes:      Conjunctiva/sclera: Conjunctivae normal.   Cardiovascular:      Rate and Rhythm: Normal rate.      Heart sounds: No murmur heard.  Pulmonary:      Effort: Pulmonary effort is normal. No respiratory distress.      Breath sounds: Decreased air movement (R lung) present.  Rales present. No wheezing.      Comments: 3L NC. Sats 94-97%. Congestive cough  Chest:      Chest wall: No tenderness.   Abdominal:      General: Bowel sounds are normal.      Palpations: Abdomen is soft.      Tenderness: There is no abdominal tenderness.   Genitourinary:     Comments: De La Cruz in place  Musculoskeletal:      Cervical back: Neck supple.      Right lower leg: Edema (trace) present.      Left lower leg: Edema (trace) present.   Skin:     General: Skin is warm and dry.   Neurological:      Mental Status: She is alert and oriented to person, place, and time.   Psychiatric:         Mood and Affect: Mood normal.       Significant Labs: All pertinent labs within the past 24 hours have been reviewed.    Significant Imaging: I have reviewed all pertinent imaging results/findings within the past 24 hours.      Assessment/Plan:      * Acute respiratory failure with hypoxia  -Progressive dyspnea over the past two weeks  -may be in the setting of acute COPD exacerbation, ADHF, and COVID-19 infection  -Procalcitonin negative  -COVID-19 protocol initiated - On Remdesivir  -D.Dimer 2.38; may be multifactorial as stated above  -L >R LLE edema; bilateral LLE US - No DVTnoted  -Initiate Lovenox 1mg/Kg daily in setting of VIRGILIO  - Breathing treatment  - IV lasix - hold due to HHS  - Supplemental oxygen titrate as needed  - Continuous pulse ox      Patient with Hypoxic Respiratory failure which is Acute.  she is not on home oxygen. Supplemental ventilation was provided and noted- Oxygen Concentration (%):  [40] 40.   Differential diagnosis includes - CHF, COPD, Pleural effusion and COVID-19 infection, MAC, chronic Aspergillosis Labs and images were reviewed. Patient Has not has a recent ABG. Will treat underlying causes and adjust management of respiratory failure as above    - weaning oxygen  - Back on IV lasix due to volume overload    Hyperosmolar hyperglycemic state (HHS)  -Patient is with evidence of HHS given  hyperglycemia, elevated anion gap metabolic acidosis, glucosuria,  -Etiology likely 2/2 high dose steroid use  - Received IVF bolus 1L over night  - Received regular insulin bolus and started on weight-based insulin infusion until the anion-gap returns to normal and blood glucose is stabilized.    -Hourly AccuCheks  -check BMPs every 4 hours. Will replace potassium as necessary.    -Will start basal-bolus insulin therapy thereafter as well as an oral diet.    -Resolved      Acute on chronic combined systolic and diastolic CHF (congestive heart failure)  Patient presents with shortness of breath and PELAYO. Bibasilar rales and peripheral edema on exam; L >R edema  - consistent clinical presentation;   -Troponin 0.015; likely demand; will continue to monitor and trend  -CXR revealed diffuse pulmonary edema with right small pleural effusion  - initiated lasix 40mg IV  in ED, continue at 40mg IV q12hr. Back on IV lasix for now due to volume overload in CXR  - continue home BB, will hold ARB in setting of VIRGILIO  - Daily weights  -Strict I/Os  - Monitor on telemetry  - Monitor and trend BMP, Mg, and renal function; keep K >4, Mg >2  -Sodium restriction (<2g/d), fluid restriction (<2L)   - 2D echo to assess cardiac function and valvular dysfunction.     Results for orders placed during the hospital encounter of 06/20/22    Echo    Interpretation Summary  · The left ventricle is normal in size with concentric hypertrophy and normal systolic function.  · The estimated ejection fraction is 55%.  · Grade II left ventricular diastolic dysfunction.  · Normal right ventricular size with normal right ventricular systolic function.  · Severe left atrial enlargement.  · Mild mitral regurgitation.  · Mild to moderate tricuspid regurgitation.  · Intermediate central venous pressure (8 mmHg).  · The estimated PA systolic pressure is 56 mmHg.  · There is pulmonary hypertension.  · There is a left pleural  effusion.                COVID-19 virus infection  - COVID positive 6/20/22; initiated on protocol  - Isolation: Airborne/Droplet. Surgical mask on patient. Notify Infection Control  - CXR with diffuse pulmonary edema with small right pleural effusion, requiring O2  - Monitor on Telemetry  -Received 40mg PO Prednisone x1 in ED   - initiated on remdesivir x5d; daily CMP  - CRP pending; D-dimer 2.38  -Covid Labs pending  - Order RT consult via Respiratory Communication for COVID Protocols  - Incentive Spirometer Q4h to promote lung compliance   - Continuous Pulse Oximetry, goal SpO2 92-96%  - supplemental O2 PRN, will wean as tolerated  - Albuterol INH Q6h scheduled & PRN   - MVI & ascorbic acid 500mg PO BID  -Anti-tussives for cough  - Acetaminophen Q6hr PRN fever/headache  - Loperamide PRN viral diarrhea  - VTE PPx: enoxaparin 1mg/kg q24h in setting of VIRGILIO  - PT/OT for debility/weakness  - If deterioration, may warrant trial of NIPPV in neg pressure room or immediate ICU consult  - Add dexamethasone - Stopped due to elevated BG with HHS and respiratory-wise clinically improving        Chronic obstructive pulmonary disease with acute exacerbation  Clinically-stable.  Afebrile. Elevation of WBC, Productive Cough.    --Received Duo-neb x1 and 40mg PO Prednisone x1 in ED  --Will continue home regimen of maintenance inhalers  -resume home montelukast and Flonase   --Albuterol INH q6 scheduled in setting of COVID-19 infection  --PRN Oxygen per Nasal Cannula for SpO2 <90%  --Pulse-Ox Monitoring every 4 hours  --Monitor respiratory status closely        Acute kidney injury superimposed on chronic kidney disease  Estimated Creatinine Clearance: 16 mL/min (A) (based on SCr of 2.2 mg/dL (H)).  Baseline Cr: 2.1Baseline BUN:46   Recent Labs   Lab 06/21/22  0232 06/21/22  2042 06/23/22  0757 06/23/22  1233 06/23/22  1614 06/24/22  0414 06/25/22  0451 06/25/22  0820   CO2 25   < > 24   < > 23 22* 25  --    BUN 65*   < > 94*   < >  92* 87* 75*  --    CREATININE 2.6*   < > 2.5*   < > 2.5* 2.2* 2.2*  --    MG 2.2  --   --   --   --   --   --   --    PHOS  --    < > 5.4*  --   --  5.3*  --  5.0*    < > = values in this interval not displayed.       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 Will hold diuresis with IV Lasix  -Monitor strict urine output  - Continue to monitor renal function with daily labs and trend electrolytes  - renally dose medications  - avoid nephrotoxic agents including NSAIDs, aminoglycosides, IV contrast (unless absolutely necessary), gadolinium, fleets and other phosphorous-based laxatives  - monitor events that may lead to decreased renal perfusion (hypovolemia, hypotension, sepsis)  - monitor urine output to assure that no obstruction precipitates worsening in GFR    - improving        UTI (urinary tract infection)  Presenting with urinary retention. UA in ED with leukocytes. Urine culture 6/1/22 prior admission grew Acinetobacter Baumannii  > 100,000 cfu/ml  - Off Cipro due to prolong QTc. Now on Cefepime for pneumonia  - F/u urine culture - No growth      Type 2 diabetes mellitus, with long-term current use of insulin  Hemoglobin A1C   Date Value Ref Range Status   02/22/2022 5.9 (H) 4.0 - 5.6 % Final     -Serum glucose on admit 161  - hold home oral medications  - LDSSI with ACCUcheck ACHS  - Diabetic diet  -Hypoglycemia protocol PRN  -Monitor closely and adjust insulin regimen as clinically indicated to achieve levels of 140-180 during hospitalization        Essential hypertension  -Resume home BB and hydralazine; hold ARB in setting of VIRGILIO  -Monitor and trend vital signs q4hr   -Anti-hypertensives PRN for SBP >180      Mycobacterium avium complex  See above      ABPA (allergic bronchopulmonary aspergillosis)  Mycobacterium avium complex    -Followed by ID, Dr. Amaya; was placed on Voriconazole therapy but unable to tolerate  -Presents to ED with worsening SOB not  "improved with recent trial of Augmentin and Prednisone  -Procalcitonin normal  -ID consulted to evaluate and assist in antibiotic administration. Per ID "Pt can be colonized with pseudomonas- had recurrent PsA in past in sputum- but can consider 5 day couse but not more than 1gm IV daily since Cr Cl was 13.9"  -Start Cefepime 1 g q24h for 5 days - Completed      Urinary retention  Hx with urinary retention due to prolapse required lauren in the past. Bladder scanned for 1240 on admission  - Lauren placed in ED  - Continue  Lauren for now  - Plan to remove Lauren for voiding trials        Hyperlipidemia associated with type 2 diabetes mellitus  Last LDL was   Lab Results   Component Value Date    LDLCALC 29.8 (L) 04/03/2022      Patient is chronically on statin.will continue for now.   Monitor clinically.          Dyspnea  -Progressive dyspnea over the past two weeks  -may be in the setting of acute COPD exacerbation, ADHF, and COVID-19 infection  -Procalcitonin pending  -COVID-19 protocol initiated  -D.Dimer 2.38; may be multifactorial as stated above  -L >R LLE edema; bilateral LLE US - No DVTnoted  -Will initiate Lovenox 1mg/Kg daily in setting of VIRGILIO      Biventricular ICD (implantable cardioverter-defibrillator) in place  -Noted        VTE Risk Mitigation (From admission, onward)         Ordered     enoxaparin injection 60 mg  Every 24 hours (non-standard times)         06/20/22 1608     IP VTE HIGH RISK PATIENT  Once         06/20/22 1603     Place sequential compression device  Until discontinued         06/20/22 1603                Discharge Planning   ALIX: 6/22/2022     Code Status: Full Code   Is the patient medically ready for discharge?:     Reason for patient still in hospital (select all that apply): Patient trending condition and Treatment  Discharge Plan A: Home Health   Discharge Delays: None known at this time              Malissa Gray NP  Department of Hospital Medicine   [place] - Telemetry  "

## 2022-06-25 NOTE — ASSESSMENT & PLAN NOTE
"Mycobacterium avium complex    -Followed by ID, Dr. Amaya; was placed on Voriconazole therapy but unable to tolerate  -Presents to ED with worsening SOB not improved with recent trial of Augmentin and Prednisone  -Procalcitonin normal  -ID consulted to evaluate and assist in antibiotic administration. Per ID "Pt can be colonized with pseudomonas- had recurrent PsA in past in sputum- but can consider 5 day couse but not more than 1gm IV daily since Cr Cl was 13.9"  -Start Cefepime 1 g q24h for 5 days - Completed    "

## 2022-06-26 LAB
ANION GAP SERPL CALC-SCNC: 13 MMOL/L (ref 8–16)
BACTERIA #/AREA URNS HPF: ABNORMAL /HPF
BILIRUB UR QL STRIP: NEGATIVE
BUN SERPL-MCNC: 73 MG/DL (ref 8–23)
CALCIUM SERPL-MCNC: 8.4 MG/DL (ref 8.7–10.5)
CHLORIDE SERPL-SCNC: 95 MMOL/L (ref 95–110)
CLARITY UR: ABNORMAL
CO2 SERPL-SCNC: 29 MMOL/L (ref 23–29)
COLOR UR: YELLOW
CREAT SERPL-MCNC: 2.1 MG/DL (ref 0.5–1.4)
D DIMER PPP IA.FEU-MCNC: 2.24 MG/L FEU
ERYTHROCYTE [DISTWIDTH] IN BLOOD BY AUTOMATED COUNT: 14.1 % (ref 11.5–14.5)
EST. GFR  (AFRICAN AMERICAN): 25 ML/MIN/1.73 M^2
EST. GFR  (NON AFRICAN AMERICAN): 21 ML/MIN/1.73 M^2
FERRITIN SERPL-MCNC: 122 NG/ML (ref 20–300)
GLUCOSE SERPL-MCNC: 107 MG/DL (ref 70–110)
GLUCOSE UR QL STRIP: ABNORMAL
HCT VFR BLD AUTO: 25.6 % (ref 37–48.5)
HGB BLD-MCNC: 8.4 G/DL (ref 12–16)
HGB UR QL STRIP: NEGATIVE
HYALINE CASTS #/AREA URNS LPF: 1 /LPF
KETONES UR QL STRIP: NEGATIVE
LDH SERPL L TO P-CCNC: 184 U/L (ref 110–260)
LEUKOCYTE ESTERASE UR QL STRIP: ABNORMAL
MCH RBC QN AUTO: 28.6 PG (ref 27–31)
MCHC RBC AUTO-ENTMCNC: 32.8 G/DL (ref 32–36)
MCV RBC AUTO: 87 FL (ref 82–98)
MICROSCOPIC COMMENT: ABNORMAL
NITRITE UR QL STRIP: NEGATIVE
NON-SQ EPI CELLS #/AREA URNS HPF: 1 /HPF
PH UR STRIP: 7 [PH] (ref 5–8)
PHOSPHATE SERPL-MCNC: 4.7 MG/DL (ref 2.7–4.5)
PLATELET # BLD AUTO: 241 K/UL (ref 150–450)
PMV BLD AUTO: 10.5 FL (ref 9.2–12.9)
POCT GLUCOSE: 110 MG/DL (ref 70–110)
POCT GLUCOSE: 170 MG/DL (ref 70–110)
POCT GLUCOSE: 249 MG/DL (ref 70–110)
POCT GLUCOSE: 250 MG/DL (ref 70–110)
POCT GLUCOSE: 260 MG/DL (ref 70–110)
POTASSIUM SERPL-SCNC: 3.3 MMOL/L (ref 3.5–5.1)
PROT UR QL STRIP: ABNORMAL
RBC # BLD AUTO: 2.94 M/UL (ref 4–5.4)
RBC #/AREA URNS HPF: 17 /HPF (ref 0–4)
SODIUM SERPL-SCNC: 137 MMOL/L (ref 136–145)
SP GR UR STRIP: 1.01 (ref 1–1.03)
SQUAMOUS #/AREA URNS HPF: 21 /HPF
URN SPEC COLLECT METH UR: ABNORMAL
UROBILINOGEN UR STRIP-ACNC: NEGATIVE EU/DL
WBC # BLD AUTO: 9.67 K/UL (ref 3.9–12.7)
WBC #/AREA URNS HPF: 48 /HPF (ref 0–5)
YEAST URNS QL MICRO: ABNORMAL

## 2022-06-26 PROCEDURE — 94660 CPAP INITIATION&MGMT: CPT

## 2022-06-26 PROCEDURE — 63600175 PHARM REV CODE 636 W HCPCS: Performed by: NURSE PRACTITIONER

## 2022-06-26 PROCEDURE — 94761 N-INVAS EAR/PLS OXIMETRY MLT: CPT

## 2022-06-26 PROCEDURE — 36415 COLL VENOUS BLD VENIPUNCTURE: CPT

## 2022-06-26 PROCEDURE — 27000207 HC ISOLATION

## 2022-06-26 PROCEDURE — 85027 COMPLETE CBC AUTOMATED: CPT | Performed by: NURSE PRACTITIONER

## 2022-06-26 PROCEDURE — 27000221 HC OXYGEN, UP TO 24 HOURS

## 2022-06-26 PROCEDURE — 87086 URINE CULTURE/COLONY COUNT: CPT | Performed by: NURSE PRACTITIONER

## 2022-06-26 PROCEDURE — 36415 COLL VENOUS BLD VENIPUNCTURE: CPT | Performed by: NURSE PRACTITIONER

## 2022-06-26 PROCEDURE — 25000003 PHARM REV CODE 250

## 2022-06-26 PROCEDURE — 25000003 PHARM REV CODE 250: Performed by: NURSE PRACTITIONER

## 2022-06-26 PROCEDURE — 25000242 PHARM REV CODE 250 ALT 637 W/ HCPCS

## 2022-06-26 PROCEDURE — 51798 US URINE CAPACITY MEASURE: CPT

## 2022-06-26 PROCEDURE — 63600175 PHARM REV CODE 636 W HCPCS

## 2022-06-26 PROCEDURE — 85379 FIBRIN DEGRADATION QUANT: CPT

## 2022-06-26 PROCEDURE — 94640 AIRWAY INHALATION TREATMENT: CPT

## 2022-06-26 PROCEDURE — 94760 N-INVAS EAR/PLS OXIMETRY 1: CPT

## 2022-06-26 PROCEDURE — 80048 BASIC METABOLIC PNL TOTAL CA: CPT | Performed by: NURSE PRACTITIONER

## 2022-06-26 PROCEDURE — 11000001 HC ACUTE MED/SURG PRIVATE ROOM

## 2022-06-26 PROCEDURE — 99900035 HC TECH TIME PER 15 MIN (STAT)

## 2022-06-26 PROCEDURE — 83615 LACTATE (LD) (LDH) ENZYME: CPT

## 2022-06-26 PROCEDURE — 36415 COLL VENOUS BLD VENIPUNCTURE: CPT | Performed by: HOSPITALIST

## 2022-06-26 PROCEDURE — 84100 ASSAY OF PHOSPHORUS: CPT | Performed by: HOSPITALIST

## 2022-06-26 PROCEDURE — 82728 ASSAY OF FERRITIN: CPT

## 2022-06-26 PROCEDURE — 81000 URINALYSIS NONAUTO W/SCOPE: CPT | Performed by: NURSE PRACTITIONER

## 2022-06-26 RX ORDER — POTASSIUM CHLORIDE 20 MEQ/1
40 TABLET, EXTENDED RELEASE ORAL ONCE
Status: COMPLETED | OUTPATIENT
Start: 2022-06-26 | End: 2022-06-26

## 2022-06-26 RX ORDER — TRAMADOL HYDROCHLORIDE 50 MG/1
50 TABLET ORAL EVERY 12 HOURS PRN
Status: DISCONTINUED | OUTPATIENT
Start: 2022-06-26 | End: 2022-06-28

## 2022-06-26 RX ADMIN — SODIUM BICARBONATE 1300 MG: 650 TABLET ORAL at 03:06

## 2022-06-26 RX ADMIN — FLUTICASONE FUROATE AND VILANTEROL TRIFENATATE 1 PUFF: 100; 25 POWDER RESPIRATORY (INHALATION) at 08:06

## 2022-06-26 RX ADMIN — GUAIFENESIN AND DEXTROMETHORPHAN HYDROBROMIDE 1 TABLET: 600; 30 TABLET, EXTENDED RELEASE ORAL at 10:06

## 2022-06-26 RX ADMIN — Medication 6 MG: at 10:06

## 2022-06-26 RX ADMIN — OXYCODONE HYDROCHLORIDE AND ACETAMINOPHEN 500 MG: 500 TABLET ORAL at 10:06

## 2022-06-26 RX ADMIN — MONTELUKAST 10 MG: 10 TABLET, FILM COATED ORAL at 10:06

## 2022-06-26 RX ADMIN — INSULIN ASPART 2 UNITS: 100 INJECTION, SOLUTION INTRAVENOUS; SUBCUTANEOUS at 05:06

## 2022-06-26 RX ADMIN — PRAVASTATIN SODIUM 40 MG: 40 TABLET ORAL at 08:06

## 2022-06-26 RX ADMIN — ENOXAPARIN SODIUM 60 MG: 100 INJECTION SUBCUTANEOUS at 04:06

## 2022-06-26 RX ADMIN — INSULIN ASPART 3 UNITS: 100 INJECTION, SOLUTION INTRAVENOUS; SUBCUTANEOUS at 01:06

## 2022-06-26 RX ADMIN — HYDRALAZINE HYDROCHLORIDE 100 MG: 25 TABLET, FILM COATED ORAL at 10:06

## 2022-06-26 RX ADMIN — ALBUTEROL SULFATE 2 PUFF: 90 AEROSOL, METERED RESPIRATORY (INHALATION) at 07:06

## 2022-06-26 RX ADMIN — FLUTICASONE PROPIONATE 100 MCG: 50 SPRAY, METERED NASAL at 08:06

## 2022-06-26 RX ADMIN — THERA TABS 1 TABLET: TAB at 08:06

## 2022-06-26 RX ADMIN — ALBUTEROL SULFATE 2 PUFF: 90 AEROSOL, METERED RESPIRATORY (INHALATION) at 12:06

## 2022-06-26 RX ADMIN — MUPIROCIN: 20 OINTMENT TOPICAL at 10:06

## 2022-06-26 RX ADMIN — ALBUTEROL SULFATE 2 PUFF: 90 AEROSOL, METERED RESPIRATORY (INHALATION) at 01:06

## 2022-06-26 RX ADMIN — HYDRALAZINE HYDROCHLORIDE 100 MG: 25 TABLET, FILM COATED ORAL at 08:06

## 2022-06-26 RX ADMIN — FUROSEMIDE 40 MG: 10 INJECTION, SOLUTION INTRAMUSCULAR; INTRAVENOUS at 12:06

## 2022-06-26 RX ADMIN — OXYCODONE HYDROCHLORIDE AND ACETAMINOPHEN 500 MG: 500 TABLET ORAL at 08:06

## 2022-06-26 RX ADMIN — SODIUM BICARBONATE 1300 MG: 650 TABLET ORAL at 08:06

## 2022-06-26 RX ADMIN — MUPIROCIN: 20 OINTMENT TOPICAL at 08:06

## 2022-06-26 RX ADMIN — SODIUM BICARBONATE 1300 MG: 650 TABLET ORAL at 10:06

## 2022-06-26 RX ADMIN — CARVEDILOL 25 MG: 25 TABLET, FILM COATED ORAL at 08:06

## 2022-06-26 RX ADMIN — CARVEDILOL 25 MG: 25 TABLET, FILM COATED ORAL at 10:06

## 2022-06-26 RX ADMIN — POTASSIUM CHLORIDE 40 MEQ: 1500 TABLET, EXTENDED RELEASE ORAL at 11:06

## 2022-06-26 RX ADMIN — GUAIFENESIN AND DEXTROMETHORPHAN HYDROBROMIDE 1 TABLET: 600; 30 TABLET, EXTENDED RELEASE ORAL at 08:06

## 2022-06-26 RX ADMIN — FUROSEMIDE 40 MG: 10 INJECTION, SOLUTION INTRAMUSCULAR; INTRAVENOUS at 11:06

## 2022-06-26 RX ADMIN — TIOTROPIUM BROMIDE 18 MCG: 18 CAPSULE ORAL; RESPIRATORY (INHALATION) at 08:06

## 2022-06-26 NOTE — ASSESSMENT & PLAN NOTE
Patient presents with shortness of breath and PELAYO. Bibasilar rales and peripheral edema on exam; L >R edema  - consistent clinical presentation;   -Troponin 0.015; likely demand; will continue to monitor and trend  -CXR revealed diffuse pulmonary edema with right small pleural effusion  - initiated lasix 40mg IV  in ED, continue at 40mg IV q12hr. Back on IV lasix for now due to volume overload in CXR  - continue home BB, will hold ARB in setting of VIRGILIO  - Daily weights  -Strict I/Os  - Monitor on telemetry  - Monitor and trend BMP, Mg, and renal function; keep K >4, Mg >2  -Sodium restriction (<2g/d), fluid restriction (<2L)   - 2D echo to assess cardiac function and valvular dysfunction.     Results for orders placed during the hospital encounter of 06/20/22    Echo    Interpretation Summary  · The left ventricle is normal in size with concentric hypertrophy and normal systolic function.  · The estimated ejection fraction is 55%.  · Grade II left ventricular diastolic dysfunction.  · Normal right ventricular size with normal right ventricular systolic function.  · Severe left atrial enlargement.  · Mild mitral regurgitation.  · Mild to moderate tricuspid regurgitation.  · Intermediate central venous pressure (8 mmHg).  · The estimated PA systolic pressure is 56 mmHg.  · There is pulmonary hypertension.  · There is a left pleural effusion.      Intake/Output Summary (Last 24 hours) at 6/26/2022 1601  Last data filed at 6/26/2022 1300  Gross per 24 hour   Intake --   Output 2450 ml   Net -2450 ml

## 2022-06-26 NOTE — ASSESSMENT & PLAN NOTE
Estimated Creatinine Clearance: 16.8 mL/min (A) (based on SCr of 2.1 mg/dL (H)).  Baseline Cr: 2.1Baseline BUN:46   Recent Labs   Lab 06/21/22  0232 06/21/22  2042 06/24/22  0414 06/25/22  0451 06/25/22  0820 06/26/22  0502 06/26/22  0755   CO2 25   < > 22* 25  --  29  --    BUN 65*   < > 87* 75*  --  73*  --    CREATININE 2.6*   < > 2.2* 2.2*  --  2.1*  --    MG 2.2  --   --   --   --   --   --    PHOS  --    < > 5.3*  --  5.0*  --  4.7*    < > = values in this interval not displayed.       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 Will hold diuresis with IV Lasix  -Monitor strict urine output  - Continue to monitor renal function with daily labs and trend electrolytes  - renally dose medications  - avoid nephrotoxic agents including NSAIDs, aminoglycosides, IV contrast (unless absolutely necessary), gadolinium, fleets and other phosphorous-based laxatives  - monitor events that may lead to decreased renal perfusion (hypovolemia, hypotension, sepsis)  - monitor urine output to assure that no obstruction precipitates worsening in GFR    - improving

## 2022-06-26 NOTE — ASSESSMENT & PLAN NOTE
Hx with urinary retention due to prolapse required lauren in the past. Bladder scanned for 1240 on admission  - Lauren placed in ED  - Lauren discontinued 6/26

## 2022-06-26 NOTE — NURSING
UA results received. Shows UTI   WBC-48   Bacteria- Few   Appearance- Hazy  Protein- 1+  Glucose- 3+  Leukocytes- 3+    NP notified. No new orders give, she wants to wait for C&S results to be received, since she just finished a 5 day course of Cefepime, and her previous UA had no growth. New order for Tramadol given for the pain. Depending on C&S results we may consult Urology since she has a history of prolapse bladder.

## 2022-06-26 NOTE — ASSESSMENT & PLAN NOTE
- COVID positive 6/20/22; initiated on protocol  - Isolation: Airborne/Droplet. Surgical mask on patient. Notify Infection Control  - CXR with diffuse pulmonary edema with small right pleural effusion, requiring O2  - Monitor on Telemetry  -Received 40mg PO Prednisone x1 in ED   - initiated on remdesivir x5d - completed  - CRP pending; D-dimer 2.38  -Covid Labs pending  - Order RT consult via Respiratory Communication for COVID Protocols  - Incentive Spirometer Q4h to promote lung compliance   - Continuous Pulse Oximetry, goal SpO2 92-96%  - supplemental O2 PRN, will wean as tolerated  - Albuterol INH Q6h scheduled & PRN   - MVI & ascorbic acid 500mg PO BID  -Anti-tussives for cough  - Acetaminophen Q6hr PRN fever/headache  - Loperamide PRN viral diarrhea  - VTE PPx: enoxaparin 1mg/kg q24h in setting of VIRGILIO  - PT/OT for debility/weakness  - If deterioration, may warrant trial of NIPPV in neg pressure room or immediate ICU consult  - Not able to treat with dexamethasone course due to uncontrolled BG up to 700s/HHS  - Continue support care

## 2022-06-26 NOTE — SUBJECTIVE & OBJECTIVE
Interval History: Not feeling well. Reports worsening SOB, congested cough, and fatigue. BiPap overnight but not tolerate whole night.  On 2L NC now. Finished remdesivir and cefepime course. On IV lasix.      Review of Systems   Constitutional:  Positive for fatigue. Negative for appetite change.   HENT:  Positive for congestion.    Respiratory:  Positive for cough and shortness of breath. Negative for wheezing.    Cardiovascular:  Negative for chest pain and leg swelling.   Gastrointestinal:  Negative for nausea.   Skin:  Negative for rash.   Neurological:  Negative for dizziness and headaches.   Psychiatric/Behavioral:  Negative for confusion.    Objective:     Vital Signs (Most Recent):  Temp: 98.9 °F (37.2 °C) (06/26/22 1007)  Pulse: 74 (06/26/22 1100)  Resp: 18 (06/26/22 1007)  BP: (!) 125/53 (06/26/22 1007)  SpO2: 95 % (06/26/22 1007)   Vital Signs (24h Range):  Temp:  [97 °F (36.1 °C)-98.9 °F (37.2 °C)] 98.9 °F (37.2 °C)  Pulse:  [69-86] 74  Resp:  [17-19] 18  SpO2:  [92 %-99 %] 95 %  BP: (125-194)/(53-81) 125/53     Weight: 59.5 kg (131 lb 2.8 oz)  Body mass index is 23.24 kg/m².    Intake/Output Summary (Last 24 hours) at 6/26/2022 1246  Last data filed at 6/25/2022 1700  Gross per 24 hour   Intake --   Output 1550 ml   Net -1550 ml        Physical Exam  Constitutional:       General: She is awake. She is not in acute distress.     Appearance: She is not diaphoretic.   HENT:      Head: Normocephalic and atraumatic.      Nose: No congestion or rhinorrhea.      Mouth/Throat:      Mouth: Mucous membranes are moist.   Eyes:      Conjunctiva/sclera: Conjunctivae normal.   Cardiovascular:      Rate and Rhythm: Normal rate.      Heart sounds: No murmur heard.  Pulmonary:      Effort: Pulmonary effort is normal. No respiratory distress.      Breath sounds: Decreased air movement (mild) present. Rales present. No wheezing.      Comments: 2L NC. Sats 94-97%. Congested cough  Chest:      Chest wall: No tenderness.    Abdominal:      General: Bowel sounds are normal.      Palpations: Abdomen is soft.      Tenderness: There is no abdominal tenderness.   Genitourinary:     Comments: No lauren  Musculoskeletal:      Cervical back: Neck supple.      Right lower leg: Edema (trace) present.      Left lower leg: Edema (trace) present.   Skin:     General: Skin is warm and dry.   Neurological:      Mental Status: She is alert and oriented to person, place, and time.   Psychiatric:         Mood and Affect: Mood normal.       Significant Labs: All pertinent labs within the past 24 hours have been reviewed.    Significant Imaging: I have reviewed all pertinent imaging results/findings within the past 24 hours.

## 2022-06-26 NOTE — PLAN OF CARE
Pt on oxygen in no apparent distress.  MDI X 3 tx. Given with ok pt. Effort.  Will cont. To monitor.

## 2022-06-26 NOTE — NURSING
Care plans reviewed. No c/o of SOB or chest pains, no acute distress noted. De La Cruz removed per order, patient tolerated well. Bed side commode put in her room, pure wick applied per physicians request. Patient c/o of burning with urination. Physician contacted, new order for UA C&A. UA collected and sent to lab. Safety maintained, bed in lowest position, bed wheels locked, bed alarm on, call light within reach. Will continue to monitor.

## 2022-06-26 NOTE — PROGRESS NOTES
"[facility] Medicine  Progress Note    Patient Name: Myesha Quinones  MRN: [id number]  Patient Class: IP- Inpatient   Admission Date: 6/20/2022  Length of Stay: 4 days  Attending Physician: Yrn Sheppard MD  Primary Care Provider: Catrachita Rothman MD        Subjective:     Principal Problem:Acute respiratory failure with hypoxia        HPI:  Myesha Quinones has a past medical history of CAD, combined systolic and diastolic heart failure, LBBB, biventricular ICD, CKD4, IDDM, breast cancer, HTN, HLD, severe persistent asthma, COPD, and HLD presents to [facility] ED with complaints of SOB. She states her SOB is associated with a productive cough. She states her SOB has been progressively worsened over the past two weeks. She reports she was seen by her pulmonologist and was prescribed antibiotic and prednisone which she started last Tuesday. She states she did not complete her antibiotics or steroid therapy. She denies fever, chest pain, palpitations, N/V, or diarrhea.     She was seen by her pulmonolgist, Dr. Maki on 6/13/22 for shortness of breath. Reported cough and congestion attributing to sinus infection. She was also noted to have a current MAC infection which she is not on therapy. She was started on 10 day course of Augmentin with 10mg PO prednisone to accompany.     Of Note: She was primarily followed by ID, Dr. Amaya, for ABPA (allergic bronchopulmonary aspergillosis) and was started on Voriconazole therapy in which she did not tolerate.       In the ED: BP (!) 165/107   Pulse 83   Temp 98 °F (36.7 °C) (Axillary)   Resp (!) 22   Ht 5' 3" (1.6 m)   Wt 59 kg (130 lb)   LMP  (LMP Unknown)   SpO2 (!) 94%   BMI 23.03 kg/m² Labs revealed WBC 13.35, H/H 9/27.8, BUN 64, creatinine 2.8, Glucose 161, . Troponin 0.015. 12 lead EKG revealed Normal sinus rhythm, Left axis deviation, Right bundle branch block. She is Covid positive. CXR revealed diffuse pulmonary edema with a right " small pleural effusion. She received Duo-neb tx x1, Lasix 40mg IV x1, and Prednisone 40mg PO x1. ID consulted to follow.         Overview/Hospital Course:  No notes on file    Interval History: Not feeling well. Reports worsening SOB, congested cough, and fatigue. BiPap overnight but not tolerate whole night.  On 2L NC now. Finished remdesivir and cefepime course. On IV lasix.      Review of Systems   Constitutional:  Positive for fatigue. Negative for appetite change.   HENT:  Positive for congestion.    Respiratory:  Positive for cough and shortness of breath. Negative for wheezing.    Cardiovascular:  Negative for chest pain and leg swelling.   Gastrointestinal:  Negative for nausea.   Skin:  Negative for rash.   Neurological:  Negative for dizziness and headaches.   Psychiatric/Behavioral:  Negative for confusion.    Objective:     Vital Signs (Most Recent):  Temp: 98.9 °F (37.2 °C) (06/26/22 1007)  Pulse: 74 (06/26/22 1100)  Resp: 18 (06/26/22 1007)  BP: (!) 125/53 (06/26/22 1007)  SpO2: 95 % (06/26/22 1007)   Vital Signs (24h Range):  Temp:  [97 °F (36.1 °C)-98.9 °F (37.2 °C)] 98.9 °F (37.2 °C)  Pulse:  [69-86] 74  Resp:  [17-19] 18  SpO2:  [92 %-99 %] 95 %  BP: (125-194)/(53-81) 125/53     Weight: 59.5 kg (131 lb 2.8 oz)  Body mass index is 23.24 kg/m².    Intake/Output Summary (Last 24 hours) at 6/26/2022 1246  Last data filed at 6/25/2022 1700  Gross per 24 hour   Intake --   Output 1550 ml   Net -1550 ml        Physical Exam  Constitutional:       General: She is awake. She is not in acute distress.     Appearance: She is not diaphoretic.   HENT:      Head: Normocephalic and atraumatic.      Nose: No congestion or rhinorrhea.      Mouth/Throat:      Mouth: Mucous membranes are moist.   Eyes:      Conjunctiva/sclera: Conjunctivae normal.   Cardiovascular:      Rate and Rhythm: Normal rate.      Heart sounds: No murmur heard.  Pulmonary:      Effort: Pulmonary effort is normal. No respiratory distress.       Breath sounds: Decreased air movement (mild) present. Rales present. No wheezing.      Comments: 2L NC. Sats 94-97%. Congested cough  Chest:      Chest wall: No tenderness.   Abdominal:      General: Bowel sounds are normal.      Palpations: Abdomen is soft.      Tenderness: There is no abdominal tenderness.   Genitourinary:     Comments: No lauren  Musculoskeletal:      Cervical back: Neck supple.      Right lower leg: Edema (trace) present.      Left lower leg: Edema (trace) present.   Skin:     General: Skin is warm and dry.   Neurological:      Mental Status: She is alert and oriented to person, place, and time.   Psychiatric:         Mood and Affect: Mood normal.       Significant Labs: All pertinent labs within the past 24 hours have been reviewed.    Significant Imaging: I have reviewed all pertinent imaging results/findings within the past 24 hours.      Assessment/Plan:      * Acute respiratory failure with hypoxia  -Progressive dyspnea over the past two weeks  -may be in the setting of acute COPD exacerbation, ADHF, and COVID-19 infection  -Procalcitonin negative  -COVID-19 protocol initiated - On Remdesivir  -D.Dimer 2.38; may be multifactorial as stated above  -L >R LLE edema; bilateral LLE US - No DVTnoted  -Initiate Lovenox 1mg/Kg daily in setting of VIRGILIO  - Breathing treatment  - IV lasix - hold due to HHS  - Supplemental oxygen titrate as needed  - Continuous pulse ox      Patient with Hypoxic Respiratory failure which is Acute.  she is not on home oxygen. Supplemental ventilation was provided and noted- Oxygen Concentration (%):  [40] 40.   Differential diagnosis includes - CHF, COPD, Pleural effusion and COVID-19 infection, MAC, chronic Aspergillosis Labs and images were reviewed. Patient Has not has a recent ABG. Will treat underlying causes and adjust management of respiratory failure as above    - weaning oxygen  - Back on IV lasix due to volume overload    Hyperosmolar hyperglycemic state  (HHS)  -Patient is with evidence of HHS given hyperglycemia, elevated anion gap metabolic acidosis, glucosuria,  -Etiology likely 2/2 high dose steroid use  - Received IVF bolus 1L over night  - Received regular insulin bolus and started on weight-based insulin infusion until the anion-gap returns to normal and blood glucose is stabilized.    -Hourly AccuCheks  -check BMPs every 4 hours. Will replace potassium as necessary.    -Will start basal-bolus insulin therapy thereafter as well as an oral diet.    -Resolved      Acute on chronic combined systolic and diastolic CHF (congestive heart failure)  Patient presents with shortness of breath and PELAYO. Bibasilar rales and peripheral edema on exam; L >R edema  - consistent clinical presentation;   -Troponin 0.015; likely demand; will continue to monitor and trend  -CXR revealed diffuse pulmonary edema with right small pleural effusion  - initiated lasix 40mg IV  in ED, continue at 40mg IV q12hr. Back on IV lasix for now due to volume overload in CXR  - continue home BB, will hold ARB in setting of VIRGILIO  - Daily weights  -Strict I/Os  - Monitor on telemetry  - Monitor and trend BMP, Mg, and renal function; keep K >4, Mg >2  -Sodium restriction (<2g/d), fluid restriction (<2L)   - 2D echo to assess cardiac function and valvular dysfunction.     Results for orders placed during the hospital encounter of 06/20/22    Echo    Interpretation Summary  · The left ventricle is normal in size with concentric hypertrophy and normal systolic function.  · The estimated ejection fraction is 55%.  · Grade II left ventricular diastolic dysfunction.  · Normal right ventricular size with normal right ventricular systolic function.  · Severe left atrial enlargement.  · Mild mitral regurgitation.  · Mild to moderate tricuspid regurgitation.  · Intermediate central venous pressure (8 mmHg).  · The estimated PA systolic pressure is 56 mmHg.  · There is pulmonary hypertension.  · There is  a left pleural effusion.      Intake/Output Summary (Last 24 hours) at 6/26/2022 1601  Last data filed at 6/26/2022 1300  Gross per 24 hour   Intake --   Output 2450 ml   Net -2450 ml               COVID-19 virus infection  - COVID positive 6/20/22; initiated on protocol  - Isolation: Airborne/Droplet. Surgical mask on patient. Notify Infection Control  - CXR with diffuse pulmonary edema with small right pleural effusion, requiring O2  - Monitor on Telemetry  -Received 40mg PO Prednisone x1 in ED   - initiated on remdesivir x5d - completed  - CRP pending; D-dimer 2.38  -Covid Labs pending  - Order RT consult via Respiratory Communication for COVID Protocols  - Incentive Spirometer Q4h to promote lung compliance   - Continuous Pulse Oximetry, goal SpO2 92-96%  - supplemental O2 PRN, will wean as tolerated  - Albuterol INH Q6h scheduled & PRN   - MVI & ascorbic acid 500mg PO BID  -Anti-tussives for cough  - Acetaminophen Q6hr PRN fever/headache  - Loperamide PRN viral diarrhea  - VTE PPx: enoxaparin 1mg/kg q24h in setting of VIRGILIO  - PT/OT for debility/weakness  - If deterioration, may warrant trial of NIPPV in neg pressure room or immediate ICU consult  - Not able to treat with dexamethasone course due to uncontrolled BG up to 700s/HHS  - Continue support care        Chronic obstructive pulmonary disease with acute exacerbation  Clinically-stable.  Afebrile. Elevation of WBC, Productive Cough.    --Received Duo-neb x1 and 40mg PO Prednisone x1 in ED  --Will continue home regimen of maintenance inhalers  -resume home montelukast and Flonase   --Albuterol INH q6 scheduled in setting of COVID-19 infection  --PRN Oxygen per Nasal Cannula for SpO2 <90%  --Pulse-Ox Monitoring every 4 hours  --Monitor respiratory status closely        Acute kidney injury superimposed on chronic kidney disease  Estimated Creatinine Clearance: 16.8 mL/min (A) (based on SCr of 2.1 mg/dL (H)).  Baseline Cr: 2.1Baseline BUN:46   Recent Labs   Lab  06/21/22  0232 06/21/22  2042 06/24/22  0414 06/25/22  0451 06/25/22  0820 06/26/22  0502 06/26/22  0755   CO2 25   < > 22* 25  --  29  --    BUN 65*   < > 87* 75*  --  73*  --    CREATININE 2.6*   < > 2.2* 2.2*  --  2.1*  --    MG 2.2  --   --   --   --   --   --    PHOS  --    < > 5.3*  --  5.0*  --  4.7*    < > = values in this interval not displayed.       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 Will hold diuresis with IV Lasix  -Monitor strict urine output  - Continue to monitor renal function with daily labs and trend electrolytes  - renally dose medications  - avoid nephrotoxic agents including NSAIDs, aminoglycosides, IV contrast (unless absolutely necessary), gadolinium, fleets and other phosphorous-based laxatives  - monitor events that may lead to decreased renal perfusion (hypovolemia, hypotension, sepsis)  - monitor urine output to assure that no obstruction precipitates worsening in GFR    - improving        UTI (urinary tract infection)  Presenting with urinary retention. UA in ED with leukocytes. Urine culture 6/1/22 prior admission grew Acinetobacter Baumannii  > 100,000 cfu/ml  - Off Cipro due to prolong QTc. Now on Cefepime for pneumonia  - F/u urine culture - No growth      Type 2 diabetes mellitus, with long-term current use of insulin  Hemoglobin A1C   Date Value Ref Range Status   02/22/2022 5.9 (H) 4.0 - 5.6 % Final     -Serum glucose on admit 161  - hold home oral medications  - LDSSI with ACCUcheck ACHS  - Diabetic diet  -Hypoglycemia protocol PRN  -Monitor closely and adjust insulin regimen as clinically indicated to achieve levels of 140-180 during hospitalization        Essential hypertension  -Resume home BB and hydralazine; hold ARB in setting of VIRGILIO  -Monitor and trend vital signs q4hr   -Anti-hypertensives PRN for SBP >180      Mycobacterium avium complex  See above      ABPA (allergic bronchopulmonary aspergillosis)  Mycobacterium  "avium complex    -Followed by ID, Dr. Amaya; was placed on Voriconazole therapy but unable to tolerate  -Presents to ED with worsening SOB not improved with recent trial of Augmentin and Prednisone  -Procalcitonin normal  -ID consulted to evaluate and assist in antibiotic administration. Per ID "Pt can be colonized with pseudomonas- had recurrent PsA in past in sputum- but can consider 5 day couse but not more than 1gm IV daily since Cr Cl was 13.9"  -Start Cefepime 1 g q24h for 5 days - Completed      Urinary retention  Hx with urinary retention due to prolapse required lauren in the past. Bladder scanned for 1240 on admission  - Lauren placed in ED  - Lauren discontinued 6/26    Hyperlipidemia associated with type 2 diabetes mellitus  Last LDL was   Lab Results   Component Value Date    LDLCALC 29.8 (L) 04/03/2022      Patient is chronically on statin.will continue for now.   Monitor clinically.          Dyspnea  -Progressive dyspnea over the past two weeks  -may be in the setting of acute COPD exacerbation, ADHF, and COVID-19 infection  -Procalcitonin pending  -COVID-19 protocol initiated  -D.Dimer 2.38; may be multifactorial as stated above  -L >R LLE edema; bilateral LLE US - No DVTnoted  -Will initiate Lovenox 1mg/Kg daily in setting of VIRGILIO      Biventricular ICD (implantable cardioverter-defibrillator) in place  -Noted        VTE Risk Mitigation (From admission, onward)         Ordered     enoxaparin injection 60 mg  Every 24 hours (non-standard times)         06/20/22 1608     IP VTE HIGH RISK PATIENT  Once         06/20/22 1603     Place sequential compression device  Until discontinued         06/20/22 1603                Discharge Planning   ALIX: 6/22/2022     Code Status: Full Code   Is the patient medically ready for discharge?:     Reason for patient still in hospital (select all that apply): Patient trending condition  Discharge Plan A: Home Health   Discharge Delays: None known at this " time              Malissa Gray NP  Department of [facility] Medicine   [place] [facility]

## 2022-06-27 LAB
ANION GAP SERPL CALC-SCNC: 12 MMOL/L (ref 8–16)
BUN SERPL-MCNC: 62 MG/DL (ref 8–23)
CALCIUM SERPL-MCNC: 8.5 MG/DL (ref 8.7–10.5)
CHLORIDE SERPL-SCNC: 94 MMOL/L (ref 95–110)
CO2 SERPL-SCNC: 31 MMOL/L (ref 23–29)
CREAT SERPL-MCNC: 2 MG/DL (ref 0.5–1.4)
EST. GFR  (AFRICAN AMERICAN): 26 ML/MIN/1.73 M^2
EST. GFR  (NON AFRICAN AMERICAN): 23 ML/MIN/1.73 M^2
GLUCOSE SERPL-MCNC: 116 MG/DL (ref 70–110)
PHOSPHATE SERPL-MCNC: 4.2 MG/DL (ref 2.7–4.5)
POCT GLUCOSE: 131 MG/DL (ref 70–110)
POCT GLUCOSE: 166 MG/DL (ref 70–110)
POCT GLUCOSE: 200 MG/DL (ref 70–110)
POCT GLUCOSE: 249 MG/DL (ref 70–110)
POTASSIUM SERPL-SCNC: 3.9 MMOL/L (ref 3.5–5.1)
SODIUM SERPL-SCNC: 137 MMOL/L (ref 136–145)

## 2022-06-27 PROCEDURE — 80048 BASIC METABOLIC PNL TOTAL CA: CPT | Performed by: NURSE PRACTITIONER

## 2022-06-27 PROCEDURE — 63600175 PHARM REV CODE 636 W HCPCS: Performed by: NURSE PRACTITIONER

## 2022-06-27 PROCEDURE — 36415 COLL VENOUS BLD VENIPUNCTURE: CPT | Performed by: NURSE PRACTITIONER

## 2022-06-27 PROCEDURE — 94640 AIRWAY INHALATION TREATMENT: CPT

## 2022-06-27 PROCEDURE — 36415 COLL VENOUS BLD VENIPUNCTURE: CPT | Performed by: HOSPITALIST

## 2022-06-27 PROCEDURE — 51702 INSERT TEMP BLADDER CATH: CPT

## 2022-06-27 PROCEDURE — 27000221 HC OXYGEN, UP TO 24 HOURS

## 2022-06-27 PROCEDURE — 11000001 HC ACUTE MED/SURG PRIVATE ROOM

## 2022-06-27 PROCEDURE — 27000207 HC ISOLATION

## 2022-06-27 PROCEDURE — 99222 1ST HOSP IP/OBS MODERATE 55: CPT | Mod: ,,, | Performed by: UROLOGY

## 2022-06-27 PROCEDURE — 99900035 HC TECH TIME PER 15 MIN (STAT)

## 2022-06-27 PROCEDURE — 63600175 PHARM REV CODE 636 W HCPCS

## 2022-06-27 PROCEDURE — 97535 SELF CARE MNGMENT TRAINING: CPT | Mod: CO

## 2022-06-27 PROCEDURE — 84100 ASSAY OF PHOSPHORUS: CPT | Performed by: HOSPITALIST

## 2022-06-27 PROCEDURE — 94660 CPAP INITIATION&MGMT: CPT

## 2022-06-27 PROCEDURE — 99222 PR INITIAL HOSPITAL CARE,LEVL II: ICD-10-PCS | Mod: ,,, | Performed by: UROLOGY

## 2022-06-27 PROCEDURE — 25000003 PHARM REV CODE 250

## 2022-06-27 PROCEDURE — 25000003 PHARM REV CODE 250: Performed by: NURSE PRACTITIONER

## 2022-06-27 PROCEDURE — 97530 THERAPEUTIC ACTIVITIES: CPT | Mod: CO

## 2022-06-27 PROCEDURE — 94760 N-INVAS EAR/PLS OXIMETRY 1: CPT

## 2022-06-27 RX ORDER — POLYETHYLENE GLYCOL 3350 17 G/17G
17 POWDER, FOR SOLUTION ORAL 2 TIMES DAILY
Status: DISCONTINUED | OUTPATIENT
Start: 2022-06-27 | End: 2022-07-01 | Stop reason: HOSPADM

## 2022-06-27 RX ORDER — FUROSEMIDE 40 MG/1
40 TABLET ORAL 2 TIMES DAILY
Status: DISCONTINUED | OUTPATIENT
Start: 2022-06-27 | End: 2022-07-01 | Stop reason: HOSPADM

## 2022-06-27 RX ADMIN — CARVEDILOL 25 MG: 25 TABLET, FILM COATED ORAL at 09:06

## 2022-06-27 RX ADMIN — FUROSEMIDE 40 MG: 40 TABLET ORAL at 05:06

## 2022-06-27 RX ADMIN — OXYCODONE HYDROCHLORIDE AND ACETAMINOPHEN 500 MG: 500 TABLET ORAL at 09:06

## 2022-06-27 RX ADMIN — ALBUTEROL SULFATE 2 PUFF: 90 AEROSOL, METERED RESPIRATORY (INHALATION) at 01:06

## 2022-06-27 RX ADMIN — POLYETHYLENE GLYCOL 3350 17 G: 17 POWDER, FOR SOLUTION ORAL at 10:06

## 2022-06-27 RX ADMIN — FLUTICASONE PROPIONATE 100 MCG: 50 SPRAY, METERED NASAL at 09:06

## 2022-06-27 RX ADMIN — SODIUM BICARBONATE 1300 MG: 650 TABLET ORAL at 09:06

## 2022-06-27 RX ADMIN — HYDRALAZINE HYDROCHLORIDE 100 MG: 25 TABLET, FILM COATED ORAL at 09:06

## 2022-06-27 RX ADMIN — GUAIFENESIN AND DEXTROMETHORPHAN HYDROBROMIDE 1 TABLET: 600; 30 TABLET, EXTENDED RELEASE ORAL at 09:06

## 2022-06-27 RX ADMIN — ENOXAPARIN SODIUM 60 MG: 100 INJECTION SUBCUTANEOUS at 05:06

## 2022-06-27 RX ADMIN — MONTELUKAST 10 MG: 10 TABLET, FILM COATED ORAL at 09:06

## 2022-06-27 RX ADMIN — Medication 6 MG: at 09:06

## 2022-06-27 RX ADMIN — MUPIROCIN: 20 OINTMENT TOPICAL at 09:06

## 2022-06-27 RX ADMIN — ACETAMINOPHEN 650 MG: 325 TABLET ORAL at 11:06

## 2022-06-27 RX ADMIN — FUROSEMIDE 40 MG: 10 INJECTION, SOLUTION INTRAMUSCULAR; INTRAVENOUS at 03:06

## 2022-06-27 RX ADMIN — INSULIN ASPART 1 UNITS: 100 INJECTION, SOLUTION INTRAVENOUS; SUBCUTANEOUS at 09:06

## 2022-06-27 RX ADMIN — ALBUTEROL SULFATE 2 PUFF: 90 AEROSOL, METERED RESPIRATORY (INHALATION) at 12:06

## 2022-06-27 RX ADMIN — TIOTROPIUM BROMIDE 18 MCG: 18 CAPSULE ORAL; RESPIRATORY (INHALATION) at 08:06

## 2022-06-27 RX ADMIN — ALBUTEROL SULFATE 2 PUFF: 90 AEROSOL, METERED RESPIRATORY (INHALATION) at 08:06

## 2022-06-27 RX ADMIN — POLYETHYLENE GLYCOL 3350 17 G: 17 POWDER, FOR SOLUTION ORAL at 01:06

## 2022-06-27 RX ADMIN — PRAVASTATIN SODIUM 40 MG: 40 TABLET ORAL at 09:06

## 2022-06-27 RX ADMIN — THERA TABS 1 TABLET: TAB at 09:06

## 2022-06-27 RX ADMIN — FLUTICASONE FUROATE AND VILANTEROL TRIFENATATE 1 PUFF: 100; 25 POWDER RESPIRATORY (INHALATION) at 08:06

## 2022-06-27 RX ADMIN — ALBUTEROL SULFATE 2 PUFF: 90 AEROSOL, METERED RESPIRATORY (INHALATION) at 07:06

## 2022-06-27 RX ADMIN — SODIUM BICARBONATE 1300 MG: 650 TABLET ORAL at 02:06

## 2022-06-27 NOTE — ASSESSMENT & PLAN NOTE
- COVID positive 6/20/22; initiated on protocol  - Isolation: Airborne/Droplet. Surgical mask on patient. Notify Infection Control  - CXR with diffuse pulmonary edema with small right pleural effusion, requiring O2  - Monitor on Telemetry  -Received 40mg PO Prednisone x1 in ED   - initiated on remdesivir x5d - completed  - CRP pending; D-dimer 2.38  -Covid Labs pending  - Order RT consult via Respiratory Communication for COVID Protocols  - Incentive Spirometer Q4h to promote lung compliance   - Continuous Pulse Oximetry, goal SpO2 92-96%  - supplemental O2 PRN, will wean as tolerated  - Albuterol INH Q6h scheduled & PRN   - MVI & ascorbic acid 500mg PO BID  -Anti-tussives for cough  - Acetaminophen Q6hr PRN fever/headache  - Loperamide PRN viral diarrhea  - VTE PPx: enoxaparin 1mg/kg q24h in setting of VIRGILIO  - PT/OT for debility/weakness  - If deterioration, may warrant trial of NIPPV in neg pressure room or immediate ICU consult  - Not able to treat with dexamethasone course due to uncontrolled BG up to 700s/HHS  - Continue support care  - weaning oxygen

## 2022-06-27 NOTE — ASSESSMENT & PLAN NOTE
Contributing Nutrition Diagnosis  Inadequate energy intake    Related to (etiology):   physiological state    Signs and Symptoms (as evidenced by):   Pt admitted with acute respiratory failure with hypoxia, COVID-19 virus infection, COPD with acute exacerbation, UTI, CHF, and VIRGILIO. Minimal intake of meals.       Interventions:  Collaboration with other providers  [employer]- Boost Glucose Control TID    Nutrition Diagnosis Status:   Continues

## 2022-06-27 NOTE — CONSULTS
[place] - Telemetry  Urology  Consult Note    Patient Name: Myesha Quinones  MRN: [id number]  Admission Date: 6/20/2022  Attending Provider: Yrn Sheppard MD   Consulting Provider: Steffen Osborn MD  Principal Problem:Acute respiratory failure with hypoxia    Consults    Subjective:     HPI:   Myesha Quinones is a 82 y.o. female who presents with shortness of breath.  Patient is known to me with a history of urinary retention.  She has recently seen by Urogynecology and had a pessary placed and De La Cruz catheter was removed and was voiding per report.  On admission the hospital she was noted to have elevated postvoid residual in the emergency department a De La Cruz catheter was placed.  This catheter was removed yesterday and she failed a void trial with postvoid residuals reportedly in the 4-500 range.  De La Cruz catheter was then replaced.  Urology was consulted for urinary retention.  Patient did have a positive urine culture prior to admitted has been on antibiotics since being admitted.  She denies dysuria or hematuria.    Past Medical History:   Diagnosis Date    Acute hypoxemic respiratory failure 12/19/2019    Acute right-sided thoracic back pain 12/09/2019    Allergy     Asthma     Basal cell carcinoma     left forehead    Basal cell carcinoma     left nose    Basal cell carcinoma 05/20/2015    right nose    Basal cell carcinoma 12/22/2015    left lower post neck    Basal cell carcinoma 12/03/2019    left ant scalp     Bilateral renal cysts     Breast cancer     CAD (coronary artery disease)     Cardiomyopathy     Cardiomyopathy, ischemic     Cataract     CHF (congestive heart failure)     CKD (chronic kidney disease) stage 4, GFR 15-29 ml/min     Colon polyp 2011    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, with long-term current use of insulin 02/22/2018    COPD (chronic obstructive pulmonary disease)     COPD  exacerbation 04/08/2018    Current mild episode of major depressive disorder without prior episode 01/25/2022    Defibrillator discharge     Diabetes mellitus     Diabetes mellitus type II     Diabetes with neurologic complications     Edema     Goiter     MNG    Hematuria, unspecified     HX: breast cancer     Hyperlipidemia     Hypertension     Hypocalcemia     Hypokalemia     Hyponatremia     Hyponatremia 4/2/2022    Iron deficiency anemia 05/16/2017    Iron deficiency anemia     Left kidney mass     Meningioma     Microalbuminuria due to type 2 diabetes mellitus 01/26/2022    Osteoporosis, postmenopausal     Pneumonia 12/08/2019    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    Proteinuria 01/21/2019    Pseudomonas pneumonia     Skin cancer     s/p excision    Sleep apnea     CPAP    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/2015    mid forehead    Unspecified vitamin D deficiency     UTI (urinary tract infection) 04/02/2022    Ventricular tachycardia     Vitamin B12 deficiency     Vitamin D deficiency disease        Past Surgical History:   Procedure Laterality Date    BASAL CELL CARCINOMA EXCISION      posterior neck and nose    BREAST BIOPSY      BREAST CYST EXCISION Left     BREAST SURGERY      CARDIAC DEFIBRILLATOR PLACEMENT      x 2    CATARACT EXTRACTION W/  INTRAOCULAR LENS IMPLANT Bilateral     CHOLECYSTECTOMY      COLONOSCOPY N/A 11/5/2019    Procedure: COLONOSCOPY;  Surgeon: Boaz Botello MD;  Location: [facility] ENDO ([address] [place] [address]);  Service: Endoscopy;  Laterality: N/A;  AICD - [employer] -     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    FRACTURE SURGERY      left elbow and wrist as a child    HYSTERECTOMY      MASTECTOMY Right     REPLACEMENT OF IMPLANTABLE CARDIOVERTER-DEFIBRILLATOR (ICD) GENERATOR N/A 12/17/2018    Procedure: REPLACEMENT, ICD GENERATOR;  Surgeon: Jan Mckeon MD;  Location: [facility] EP LAB;  Service: Cardiology;  Laterality: N/A;  DONNA, CRT-D gen  change, MDT, MAC, SK, 3 Prep    REVISION OF SKIN POCKET FOR CARDIOVERTER-DEFIBRILLATOR  12/17/2018    Procedure: Revision, Skin Pocket, For Cardioverter-Defibrillator;  Surgeon: Jan Mckeon MD;  Location: [facility] LAB;  Service: Cardiology;;    SQUAMOUS CELL CARCINOMA EXCISION      remved from forehead    TONSILLECTOMY         Review of patient's allergies indicates:   Allergen Reactions    Iodine and iodide containing products Hives    Nifedipine      weakness       Family History     Problem Relation (Age of Onset)    Asthma Mother    Cancer Brother, Son, Daughter    Diabetes Father, Sister, Brother, Brother, Brother, Brother, Son, Daughter, Son    Heart disease Father, Sister, Brother, Brother, Brother    Hypertension Brother, Brother, Mother    Melanoma Daughter    No Known Problems Maternal Grandmother, Maternal Grandfather, Paternal Grandmother, Paternal Grandfather    Obesity Daughter    Stroke Mother          Tobacco Use    Smoking status: Never Smoker    Smokeless tobacco: Never Used   Substance and Sexual Activity    Alcohol use: No     Alcohol/week: 0.0 standard drinks    Drug use: No    Sexual activity: Yes     Partners: Male       Review of Systems   Constitutional: Positive for fatigue. Negative for fever.   Respiratory: Positive for shortness of breath.    Cardiovascular: Negative for chest pain.   Gastrointestinal: Negative for nausea and vomiting.   Genitourinary: Positive for difficulty urinating. Negative for dysuria, flank pain and hematuria.       Objective:     Temp:  [96.1 °F (35.6 °C)-99.8 °F (37.7 °C)] 98.1 °F (36.7 °C)  Pulse:  [68-94] 81  Resp:  [15-20] 18  SpO2:  [93 %-99 %] 93 %  BP: (107-168)/(49-72) 155/70       NAD  RRR  CTAB  ABD S/ND/NTTP       De La Cruz clear      Significant Labs:  BMP:  Recent Labs   Lab 06/25/22  0451 06/26/22  0502 06/27/22  0356    137 137   K 3.9 3.3* 3.9   CL 96 95 94*   CO2 25 29 31*   BUN 75* 73* 62*   CREATININE 2.2* 2.1* 2.0*   CALCIUM 8.0*  8.4* 8.5*       CBC:  Recent Labs   Lab 06/24/22  0414 06/25/22  0451 06/26/22  0502   WBC 13.74* 10.35 9.67   HGB 7.7* 8.0* 8.4*   HCT 24.7* 25.3* 25.6*    233 241       Urinalysis  Recent Labs   Lab 06/26/22  1656   COLORU Yellow   SPECGRAV 1.010   PHUR 7.0   PROTEINUA 1+*   BACTERIA Few*   NITRITE Negative   LEUKOCYTESUR 3+*   UROBILINOGEN Negative   HYALINECASTS 1       Urine culture pending        Assessment:     Problem Noted   Urinary Retention 2/28/2022    82 yo F with urinary retention.  Lauren placed 2/28/22.   --recurrent retention, lauren replaced 4/1/22  --Pessary placed per urogyn and lauren removed  --admitted 6/2022 and incomplete emptying despite pessary     Uti (Urinary Tract Infection) 6/21/2022   Acute Kidney Injury Superimposed On Chronic Kidney Disease 7/27/2020   Acute Hyponatremia (Resolved) 2/24/2022   Metabolic Acidosis (Resolved) 2/24/2022   Acute Renal Failure Superimposed On Stage 4 Chronic Kidney Disease (Resolved) 12/7/2019   Proteinuria (Resolved) 1/21/2019     Plan:    1.  Follow-up final urine culture.  2. Continue Lauren now and at discharge.  Unclear if full ever into her bladder completely.  Continue pessary per Urogynecology.  Follow-up outpatient in 1-2 weeks for possible void trial.  Discharge navigator can arrange this as long as it is an early a.m. before 9:00 a.m. appointment  3. No further urologic intervention needed at current.  Will sign off please call with questions.     Thank you for your consult.     Steffen Osborn MD  Urology-[place]

## 2022-06-27 NOTE — SUBJECTIVE & OBJECTIVE
Interval History: Feels slightly better today. Still with congested cough and SOB with exertion. Failed voiding trials yesterday, now back with De La Cruz. Urology consulted. Repeat CXR today    Review of Systems   Constitutional:  Positive for fatigue. Negative for chills and fever.   Respiratory:  Positive for cough and shortness of breath. Negative for wheezing.    Cardiovascular:  Negative for chest pain, palpitations and leg swelling.   Gastrointestinal:  Negative for abdominal pain, nausea and vomiting.   Genitourinary:  Positive for difficulty urinating.   Musculoskeletal:  Negative for myalgias.   Neurological:  Positive for weakness. Negative for dizziness and headaches.   Objective:     Vital Signs (Most Recent):  Temp: 98.1 °F (36.7 °C) (06/27/22 1017)  Pulse: 86 (06/27/22 1354)  Resp: 15 (06/27/22 1354)  BP: (!) 155/70 (06/27/22 1017)  SpO2: 98 % (06/27/22 1354)   Vital Signs (24h Range):  Temp:  [96.1 °F (35.6 °C)-99.8 °F (37.7 °C)] 98.1 °F (36.7 °C)  Pulse:  [68-94] 86  Resp:  [15-20] 15  SpO2:  [93 %-99 %] 98 %  BP: (107-168)/(49-72) 155/70     Weight: 59.5 kg (131 lb 2.8 oz)  Body mass index is 23.24 kg/m².    Intake/Output Summary (Last 24 hours) at 6/27/2022 1407  Last data filed at 6/26/2022 2323  Gross per 24 hour   Intake --   Output 980 ml   Net -980 ml      Physical Exam  Constitutional:       General: She is not in acute distress.     Appearance: She is not diaphoretic.   HENT:      Head: Normocephalic and atraumatic.      Nose: Congestion present. No rhinorrhea.      Mouth/Throat:      Mouth: Mucous membranes are moist.   Eyes:      Conjunctiva/sclera: Conjunctivae normal.   Cardiovascular:      Rate and Rhythm: Normal rate.      Heart sounds: No murmur heard.  Pulmonary:      Effort: No respiratory distress.      Breath sounds: Rales present. No wheezing.      Comments: De La Cruz draining yellow clear urine  Chest:      Chest wall: No tenderness.   Abdominal:      General: Bowel sounds are normal.       Palpations: Abdomen is soft.      Tenderness: There is no abdominal tenderness. There is no right CVA tenderness or left CVA tenderness.   Musculoskeletal:         General: No tenderness.      Cervical back: Neck supple.      Right lower leg: No edema.      Left lower leg: No edema.   Skin:     General: Skin is warm and dry.   Neurological:      Mental Status: She is alert and oriented to person, place, and time.      Motor: Weakness present.   Psychiatric:         Mood and Affect: Mood normal.       Significant Labs: All pertinent labs within the past 24 hours have been reviewed.    Significant Imaging: I have reviewed all pertinent imaging results/findings within the past 24 hours.

## 2022-06-27 NOTE — ASSESSMENT & PLAN NOTE
Patient presents with shortness of breath and PELAYO. Bibasilar rales and peripheral edema on exam; L >R edema  - consistent clinical presentation;   -Troponin 0.015; likely demand; will continue to monitor and trend  -CXR revealed diffuse pulmonary edema with right small pleural effusion  - initiated lasix 40mg IV  in ED, continue at 40mg IV q12hr. Back on IV lasix for now due to volume overload in CXR  - continue home BB, will hold ARB in setting of VIRGILIO  - Daily weights  -Strict I/Os  - Monitor on telemetry  - Monitor and trend BMP, Mg, and renal function; keep K >4, Mg >2  -Sodium restriction (<2g/d), fluid restriction (<2L)   - 2D echo to assess cardiac function and valvular dysfunction.     Results for orders placed during the hospital encounter of 06/20/22    Echo    Interpretation Summary  · The left ventricle is normal in size with concentric hypertrophy and normal systolic function.  · The estimated ejection fraction is 55%.  · Grade II left ventricular diastolic dysfunction.  · Normal right ventricular size with normal right ventricular systolic function.  · Severe left atrial enlargement.  · Mild mitral regurgitation.  · Mild to moderate tricuspid regurgitation.  · Intermediate central venous pressure (8 mmHg).  · The estimated PA systolic pressure is 56 mmHg.  · There is pulmonary hypertension.  · There is a left pleural effusion.      Intake/Output Summary (Last 24 hours) at 6/27/2022 1413  Last data filed at 6/26/2022 2323  Gross per 24 hour   Intake --   Output 980 ml   Net -980 ml       - Plan to switch to po lasix if CXR improving

## 2022-06-27 NOTE — PROGRESS NOTES
"[facility] Medicine  Progress Note    Patient Name: Myesha Quinones  MRN: [id number]  Patient Class: IP- Inpatient   Admission Date: 6/20/2022  Length of Stay: 5 days  Attending Physician: Yrn Sheppard MD  Primary Care Provider: Catrachita Rothman MD        Subjective:     Principal Problem:Acute respiratory failure with hypoxia        HPI:  Myesha Quinones has a past medical history of CAD, combined systolic and diastolic heart failure, LBBB, biventricular ICD, CKD4, IDDM, breast cancer, HTN, HLD, severe persistent asthma, COPD, and HLD presents to [facility] ED with complaints of SOB. She states her SOB is associated with a productive cough. She states her SOB has been progressively worsened over the past two weeks. She reports she was seen by her pulmonologist and was prescribed antibiotic and prednisone which she started last Tuesday. She states she did not complete her antibiotics or steroid therapy. She denies fever, chest pain, palpitations, N/V, or diarrhea.     She was seen by her pulmonolgist, Dr. Maki on 6/13/22 for shortness of breath. Reported cough and congestion attributing to sinus infection. She was also noted to have a current MAC infection which she is not on therapy. She was started on 10 day course of Augmentin with 10mg PO prednisone to accompany.     Of Note: She was primarily followed by ID, Dr. Amaya, for ABPA (allergic bronchopulmonary aspergillosis) and was started on Voriconazole therapy in which she did not tolerate.       In the ED: BP (!) 165/107   Pulse 83   Temp 98 °F (36.7 °C) (Axillary)   Resp (!) 22   Ht 5' 3" (1.6 m)   Wt 59 kg (130 lb)   LMP  (LMP Unknown)   SpO2 (!) 94%   BMI 23.03 kg/m² Labs revealed WBC 13.35, H/H 9/27.8, BUN 64, creatinine 2.8, Glucose 161, . Troponin 0.015. 12 lead EKG revealed Normal sinus rhythm, Left axis deviation, Right bundle branch block. She is Covid positive. CXR revealed diffuse pulmonary edema with a right " small pleural effusion. She received Duo-neb tx x1, Lasix 40mg IV x1, and Prednisone 40mg PO x1. ID consulted to follow.         Overview/Hospital Course:  No notes on file    Interval History: Feels slightly better today. Still with congested cough and SOB with exertion. Failed voiding trials yesterday, now back with De La Cruz. Urology consulted. Repeat CXR today    Review of Systems   Constitutional:  Positive for fatigue. Negative for chills and fever.   Respiratory:  Positive for cough and shortness of breath. Negative for wheezing.    Cardiovascular:  Negative for chest pain, palpitations and leg swelling.   Gastrointestinal:  Negative for abdominal pain, nausea and vomiting.   Genitourinary:  Positive for difficulty urinating.   Musculoskeletal:  Negative for myalgias.   Neurological:  Positive for weakness. Negative for dizziness and headaches.   Objective:     Vital Signs (Most Recent):  Temp: 98.1 °F (36.7 °C) (06/27/22 1017)  Pulse: 86 (06/27/22 1354)  Resp: 15 (06/27/22 1354)  BP: (!) 155/70 (06/27/22 1017)  SpO2: 98 % (06/27/22 1354)   Vital Signs (24h Range):  Temp:  [96.1 °F (35.6 °C)-99.8 °F (37.7 °C)] 98.1 °F (36.7 °C)  Pulse:  [68-94] 86  Resp:  [15-20] 15  SpO2:  [93 %-99 %] 98 %  BP: (107-168)/(49-72) 155/70     Weight: 59.5 kg (131 lb 2.8 oz)  Body mass index is 23.24 kg/m².    Intake/Output Summary (Last 24 hours) at 6/27/2022 1407  Last data filed at 6/26/2022 2323  Gross per 24 hour   Intake --   Output 980 ml   Net -980 ml      Physical Exam  Constitutional:       General: She is not in acute distress.     Appearance: She is not diaphoretic.   HENT:      Head: Normocephalic and atraumatic.      Nose: Congestion present. No rhinorrhea.      Mouth/Throat:      Mouth: Mucous membranes are moist.   Eyes:      Conjunctiva/sclera: Conjunctivae normal.   Cardiovascular:      Rate and Rhythm: Normal rate.      Heart sounds: No murmur heard.  Pulmonary:      Effort: No respiratory distress.      Breath  sounds: Rales present. No wheezing.      Comments: De La Cruz draining yellow clear urine  Chest:      Chest wall: No tenderness.   Abdominal:      General: Bowel sounds are normal.      Palpations: Abdomen is soft.      Tenderness: There is no abdominal tenderness. There is no right CVA tenderness or left CVA tenderness.   Musculoskeletal:         General: No tenderness.      Cervical back: Neck supple.      Right lower leg: No edema.      Left lower leg: No edema.   Skin:     General: Skin is warm and dry.   Neurological:      Mental Status: She is alert and oriented to person, place, and time.      Motor: Weakness present.   Psychiatric:         Mood and Affect: Mood normal.       Significant Labs: All pertinent labs within the past 24 hours have been reviewed.    Significant Imaging: I have reviewed all pertinent imaging results/findings within the past 24 hours.      Assessment/Plan:      * Acute respiratory failure with hypoxia  -Progressive dyspnea over the past two weeks  -may be in the setting of acute COPD exacerbation, ADHF, and COVID-19 infection  -Procalcitonin negative  -COVID-19 protocol initiated - On Remdesivir  -D.Dimer 2.38; may be multifactorial as stated above  -L >R LLE edema; bilateral LLE US - No DVTnoted  -Initiate Lovenox 1mg/Kg daily in setting of VIRGILIO  - Breathing treatment  - IV lasix - hold due to HHS  - Supplemental oxygen titrate as needed  - Continuous pulse ox      Patient with Hypoxic Respiratory failure which is Acute.  she is not on home oxygen. Supplemental ventilation was provided and noted- Oxygen Concentration (%):  [30] 30.   Differential diagnosis includes - CHF, COPD, Pleural effusion and COVID-19 infection, MAC, chronic Aspergillosis Labs and images were reviewed. Patient Has not has a recent ABG. Will treat underlying causes and adjust management of respiratory failure as above    - weaning oxygen  - Back on IV lasix due to volume overload  - Switch to PO lasix if CXR  improving    Hyperosmolar hyperglycemic state (HHS)  -Patient is with evidence of HHS given hyperglycemia, elevated anion gap metabolic acidosis, glucosuria,  -Etiology likely 2/2 high dose steroid use  - Received IVF bolus 1L over night  - Received regular insulin bolus and started on weight-based insulin infusion until the anion-gap returns to normal and blood glucose is stabilized.    -Hourly AccuCheks  -check BMPs every 4 hours. Will replace potassium as necessary.    -Will start basal-bolus insulin therapy thereafter as well as an oral diet.    -Resolved      Acute on chronic combined systolic and diastolic CHF (congestive heart failure)  Patient presents with shortness of breath and PELAYO. Bibasilar rales and peripheral edema on exam; L >R edema  - consistent clinical presentation;   -Troponin 0.015; likely demand; will continue to monitor and trend  -CXR revealed diffuse pulmonary edema with right small pleural effusion  - initiated lasix 40mg IV  in ED, continue at 40mg IV q12hr. Back on IV lasix for now due to volume overload in CXR  - continue home BB, will hold ARB in setting of VIRGILIO  - Daily weights  -Strict I/Os  - Monitor on telemetry  - Monitor and trend BMP, Mg, and renal function; keep K >4, Mg >2  -Sodium restriction (<2g/d), fluid restriction (<2L)   - 2D echo to assess cardiac function and valvular dysfunction.     Results for orders placed during the hospital encounter of 06/20/22    Echo    Interpretation Summary  · The left ventricle is normal in size with concentric hypertrophy and normal systolic function.  · The estimated ejection fraction is 55%.  · Grade II left ventricular diastolic dysfunction.  · Normal right ventricular size with normal right ventricular systolic function.  · Severe left atrial enlargement.  · Mild mitral regurgitation.  · Mild to moderate tricuspid regurgitation.  · Intermediate central venous pressure (8 mmHg).  · The estimated PA systolic pressure is 56  mmHg.  · There is pulmonary hypertension.  · There is a left pleural effusion.      Intake/Output Summary (Last 24 hours) at 6/27/2022 1413  Last data filed at 6/26/2022 2323  Gross per 24 hour   Intake --   Output 980 ml   Net -980 ml       - Plan to switch to po lasix if CXR improving        COVID-19 virus infection  - COVID positive 6/20/22; initiated on protocol  - Isolation: Airborne/Droplet. Surgical mask on patient. Notify Infection Control  - CXR with diffuse pulmonary edema with small right pleural effusion, requiring O2  - Monitor on Telemetry  -Received 40mg PO Prednisone x1 in ED   - initiated on remdesivir x5d - completed  - CRP pending; D-dimer 2.38  -Covid Labs pending  - Order RT consult via Respiratory Communication for COVID Protocols  - Incentive Spirometer Q4h to promote lung compliance   - Continuous Pulse Oximetry, goal SpO2 92-96%  - supplemental O2 PRN, will wean as tolerated  - Albuterol INH Q6h scheduled & PRN   - MVI & ascorbic acid 500mg PO BID  -Anti-tussives for cough  - Acetaminophen Q6hr PRN fever/headache  - Loperamide PRN viral diarrhea  - VTE PPx: enoxaparin 1mg/kg q24h in setting of VIRGILIO  - PT/OT for debility/weakness  - If deterioration, may warrant trial of NIPPV in neg pressure room or immediate ICU consult  - Not able to treat with dexamethasone course due to uncontrolled BG up to 700s/HHS  - Continue support care  - weaning oxygen        Chronic obstructive pulmonary disease with acute exacerbation  Clinically-stable.  Afebrile. Elevation of WBC, Productive Cough.    --Received Duo-neb x1 and 40mg PO Prednisone x1 in ED  --Will continue home regimen of maintenance inhalers  -resume home montelukast and Flonase   --Albuterol INH q6 scheduled in setting of COVID-19 infection  --PRN Oxygen per Nasal Cannula for SpO2 <90%  --Pulse-Ox Monitoring every 4 hours  --Monitor respiratory status closely        Acute kidney injury superimposed on chronic kidney disease  Estimated  Creatinine Clearance: 16.8 mL/min (A) (based on SCr of 2.1 mg/dL (H)).  Baseline Cr: 2.1Baseline BUN:46   Recent Labs   Lab 06/21/22  0232 06/21/22  2042 06/24/22  0414 06/25/22  0451 06/25/22  0820 06/26/22  0502 06/26/22  0755   CO2 25   < > 22* 25  --  29  --    BUN 65*   < > 87* 75*  --  73*  --    CREATININE 2.6*   < > 2.2* 2.2*  --  2.1*  --    MG 2.2  --   --   --   --   --   --    PHOS  --    < > 5.3*  --  5.0*  --  4.7*    < > = values in this interval not displayed.       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 Will hold diuresis with IV Lasix  -Monitor strict urine output  - Continue to monitor renal function with daily labs and trend electrolytes  - renally dose medications  - avoid nephrotoxic agents including NSAIDs, aminoglycosides, IV contrast (unless absolutely necessary), gadolinium, fleets and other phosphorous-based laxatives  - monitor events that may lead to decreased renal perfusion (hypovolemia, hypotension, sepsis)  - monitor urine output to assure that no obstruction precipitates worsening in GFR    - improving        UTI (urinary tract infection)  Presenting with urinary retention. UA in ED with leukocytes. Urine culture 6/1/22 prior admission grew Acinetobacter Baumannii  > 100,000 cfu/ml  - Off Cipro due to prolong QTc. Now on Cefepime for pneumonia  - F/u urine culture - No growth      Type 2 diabetes mellitus, with long-term current use of insulin  Hemoglobin A1C   Date Value Ref Range Status   02/22/2022 5.9 (H) 4.0 - 5.6 % Final     -Serum glucose on admit 161  - hold home oral medications  - LDSSI with ACCUcheck ACHS  - Diabetic diet  -Hypoglycemia protocol PRN  -Monitor closely and adjust insulin regimen as clinically indicated to achieve levels of 140-180 during hospitalization        Essential hypertension  -Resume home BB and hydralazine; hold ARB in setting of VIRGILIO  -Monitor and trend vital signs q4hr   -Anti-hypertensives  "PRN for SBP >180      Mycobacterium avium complex  See above      ABPA (allergic bronchopulmonary aspergillosis)  Mycobacterium avium complex    -Followed by ID, Dr. Amaya; was placed on Voriconazole therapy but unable to tolerate  -Presents to ED with worsening SOB not improved with recent trial of Augmentin and Prednisone  -Procalcitonin normal  -ID consulted to evaluate and assist in antibiotic administration. Per ID "Pt can be colonized with pseudomonas- had recurrent PsA in past in sputum- but can consider 5 day couse but not more than 1gm IV daily since Cr Cl was 13.9"  -Start Cefepime 1 g q24h for 5 days - Completed      Urinary retention  Hx with urinary retention due to prolapse required lauren in the past. Bladder scanned for 1240 on admission  - Lauren placed in ED  - Failed voiding trials 6/26. Now back with Lauren  - Urology consulted who recommends keep Lauren, no further urologic intervention needed at current, and f/u outpatient 1-2 weeks for void trials      Hyperlipidemia associated with type 2 diabetes mellitus  Last LDL was   Lab Results   Component Value Date    LDLCALC 29.8 (L) 04/03/2022      Patient is chronically on statin.will continue for now.   Monitor clinically.          Dyspnea  -Progressive dyspnea over the past two weeks  -may be in the setting of acute COPD exacerbation, ADHF, and COVID-19 infection  -Procalcitonin pending  -COVID-19 protocol initiated  -D.Dimer 2.38; may be multifactorial as stated above  -L >R LLE edema; bilateral LLE US - No DVTnoted  -Will initiate Lovenox 1mg/Kg daily in setting of VIRGILIO      Biventricular ICD (implantable cardioverter-defibrillator) in place  -Noted        VTE Risk Mitigation (From admission, onward)         Ordered     enoxaparin injection 60 mg  Every 24 hours (non-standard times)         06/20/22 1608     IP VTE HIGH RISK PATIENT  Once         06/20/22 1603     Place sequential compression device  Until discontinued         06/20/22 1603       "          Discharge Planning   ALIX: 6/22/2022     Code Status: Full Code   Is the patient medically ready for discharge?:     Reason for patient still in hospital (select all that apply): Patient trending condition and Treatment  Discharge Plan A: Home Health   Discharge Delays: None known at this time              Malissa Gray NP  Department of [facility] Medicine   [facility]

## 2022-06-27 NOTE — PROGRESS NOTES
[facility] - Telemetry  Adult Nutrition  Progress Note    SUMMARY       Recommendations    Recommendation:   1. Continue current diabetic diet as tolerated with Boost Glucose Control TID.   2. Encourage intake of meals and ONS.   3. Consider appetite stimulant.   4. Monitor weight/labs.   5. RD to follow and monitor intake    Goals:   Pt intake >/= 50% EEN/EPN by RD follow up    Nutrition Goal Status: new  Communication of RD Recs: other (comment) (POC)    Assessment and Plan    * Acute respiratory failure with hypoxia  Contributing Nutrition Diagnosis  Inadequate energy intake    Related to (etiology):   physiological state    Signs and Symptoms (as evidenced by):   Pt admitted with acute respiratory failure with hypoxia, COVID-19 virus infection, COPD with acute exacerbation, UTI, CHF, and VIRGILIO. Minimal intake of meals.       Interventions:  Collaboration with other providers  [employer]- Boost Glucose Control TID    Nutrition Diagnosis Status:   New           Malnutrition Assessment    NFPE not performed, patient has been screened for possible COVID-19 and has been placed on airborne and contact precautions. Patient is noted as being positive for COVID-19.    Reason for Assessment    Reason For Assessment: RD follow-up  Diagnosis: other (see comments) (Acute respiratory failure with hypoxia)  Relevant Medical History: HTN, HLD, vitamin D and vitamin B13 deficiency, CHF, CAD, ischemic cariomyopathy, meningioma, breast cacer, left kidney mass, DMT2, goiter, osteoporosis, COPD, BCC with removals, pulmonary fibrosis, anemia, CKD stage 4, cardiac defibrillator placement, cholecystectomy, R mastectimy, replacement of ICD  Interdisciplinary Rounds: did not attend  General Information Comments: Pt receiving diabetic diet with Boost Glucose Control TID. RN reports minimal intake per RN, not receiving Boost Glucose Control. PIV. Staya Score: 12 NFPE not completed 2/2 COVID isolation  Nutrition Discharge Planning: d/c  "on diabetic diet with Boost Glucose Control or similar BID    Nutrition Risk Screen    Nutrition Risk Screen: no indicators present    Nutrition/Diet History    Food Preferences: MARIO  Spiritual, Cultural Beliefs, Sikh Practices, Values that Affect Care: no  Food Allergies: NKFA  Factors Affecting Nutritional Intake: decreased appetite    Anthropometrics    Temp: 98.1 °F (36.7 °C)  Height Method: Stated  Height: 5' 3" (160 cm)  Height (inches): 63 in  Weight Method: Bed Scale  Weight: 59.5 kg (131 lb 2.8 oz)  Weight (lb): 131.18 lb  Ideal Body Weight (IBW), Female: 115 lb  % Ideal Body Weight, Female (lb): 114.07 %  BMI (Calculated): 23.2  BMI Grade: 18.5-24.9 - normal       Lab/Procedures/Meds    Pertinent Labs Reviewed: reviewed  Pertinent Labs Comments: Cl 94, CO2 31, BUN 62, Cr 2.0, eGFr 23, Glu 116, Ca 8.5, A1C 7.9  Pertinent Medications Reviewed: reviewed  Pertinent Medications Comments: ascorbic acid, carvedilol, dextromethorphan-guaifenesisn, enoxaparin, furosemide, hydralazine, insulin detemir, mentelukast, MVI, statin, Na bicarb, tiotropium    Estimated/Assessed Needs    Weight Used For Calorie Calculations: 59.5 kg (131 lb 2.8 oz)  Energy Calorie Requirements (kcal): 1488  Energy Need Method: Kcal/kg (25 kcal/kg)  Protein Requirements: 60-72 (1.0-1.2 gm/kg)  Weight Used For Protein Calculations: 59.5 kg (131 lb 2.8 oz)     Estimated Fluid Requirement Method: RDA Method (or PER MD)  RDA Method (mL): 1488  CHO Requirement: 170 gm/day      Nutrition Prescription Ordered    Current Diet Order: Diabetic  Oral Nutrition Supplement: Boost Glucose Control TID    Evaluation of Received Nutrient/Fluid Intake    I/O: -1880  Energy Calories Required: not meeting needs  Protein Required: not meeting needs  Fluid Required: not meeting needs  Comments: LBM 6/23  % Intake of Estimated Energy Needs: 0 - 25 %  % Meal Intake: 0 - 25 %    Nutrition Risk    Level of Risk/Frequency of Follow-up:  (2x/week)     Monitor and " Evaluation    Food and Nutrient Intake: energy intake, food and beverage intake  Food and Nutrient Adminstration: diet order  Knowledge/Beliefs/Attitudes: food and nutrition knowledge/skill  Physical Activity and Function: nutrition-related ADLs and IADLs  Anthropometric Measurements: weight, weight change, body mass index  Biochemical Data, Medical Tests and Procedures: electrolyte and renal panel, gastrointestinal profile, glucose/endocrine profile, inflammatory profile, lipid profile     Nutrition Follow-Up    RD Follow-up?: Yes

## 2022-06-27 NOTE — PLAN OF CARE
Problem: Occupational Therapy  Goal: Occupational Therapy Goal  Description: Goals to be met by: 7/22     Patient will increase functional independence with ADLs by performing:    UE Dressing with Modified [place].  LE Dressing with Modified [place].  Grooming while standing at sink with Modified [place].  Toileting from toilet with Modified [place] for hygiene and clothing management.   Toilet transfer to toilet with Modified [place].  Upper extremity exercise program x10 reps per handout, with independence.  Pt will demo good understanding of energy conservation/work simplification    Outcome: Ongoing, Progressing     Patient requiring increased (A) for all tasks this date. Patient deconditioned and will benefit from HH OT/PT upon D/C home.

## 2022-06-27 NOTE — ASSESSMENT & PLAN NOTE
-Progressive dyspnea over the past two weeks  -may be in the setting of acute COPD exacerbation, ADHF, and COVID-19 infection  -Procalcitonin negative  -COVID-19 protocol initiated - On Remdesivir  -D.Dimer 2.38; may be multifactorial as stated above  -L >R LLE edema; bilateral LLE US - No DVTnoted  -Initiate Lovenox 1mg/Kg daily in setting of VIRGILIO  - Breathing treatment  - IV lasix - hold due to HHS  - Supplemental oxygen titrate as needed  - Continuous pulse ox      Patient with Hypoxic Respiratory failure which is Acute.  she is not on home oxygen. Supplemental ventilation was provided and noted- Oxygen Concentration (%):  [30] 30.   Differential diagnosis includes - CHF, COPD, Pleural effusion and COVID-19 infection, MAC, chronic Aspergillosis Labs and images were reviewed. Patient Has not has a recent ABG. Will treat underlying causes and adjust management of respiratory failure as above    - weaning oxygen  - Back on IV lasix due to volume overload  - Switch to PO lasix if CXR improving

## 2022-06-27 NOTE — PLAN OF CARE
Recommendation:   1. Continue current diabetic diet as tolerated with Boost Glucose Control TID.   2. Encourage intake of meals and ONS.   3. Consider appetite stimulant.   4. Monitor weight/labs.   5. RD to follow and monitor intake    Goals:   Pt intake >/= 50% EEN/EPN by RD follow up    Nutrition Goal Status: new  Communication of RD Recs: other (comment) (POC)      Problem: Oral Intake Inadequate (Acute Kidney Injury/Impairment)  Goal: Optimal Nutrition Intake  Outcome: Ongoing, Progressing

## 2022-06-27 NOTE — PROGRESS NOTES
Satya score 12- skin assessment performed noting blanchable redness to sacrum- heels intact. Recommend nursing to maintain pressure injury prevention interventions to include waffle overlay surface. Notified nursing and will place orders for moisture barrier to sacrum BID.

## 2022-06-27 NOTE — ASSESSMENT & PLAN NOTE
Hx with urinary retention due to prolapse required lauren in the past. Bladder scanned for 1240 on admission  - Lauren placed in ED  - Failed voiding trials 6/26. Now back with Lauren  - Urology consulted who recommends keep Lauren, no further urologic intervention needed at current, and f/u outpatient 1-2 weeks for void trials

## 2022-06-27 NOTE — PLAN OF CARE
SW called [facility] Phone: [phone]  and left a VM requesting a return call. SW also sent updated information via [employer] for pt's upcoming d/c. SW will follow.    Berto Sierra, MSW  [phone]    Future Appointments   Date Time Provider Department Center   6/29/2022 10:00 AM Ev Jiménez MD [facility] IMPRI [facility]   7/7/2022 11:00 AM CARRIE Arechiga MD [facility] OPHTHAL [address]   7/11/2022  2:00 PM Leonides Fuentes MD [facility] DERM [address]   7/12/2022 10:00 AM [facility] CT1 LIMIT 450 LBS [facility] CT SCAN [facility]   7/26/2022 11:00 AM Catrachita Rothman MD [facility] IM [address] PCW   8/6/2022 10:00 AM HOME MONITOR DEVICE CHECK, [facility]   8/23/2022  9:45 AM Mnasi Borrero MD [facility]   8/25/2022 11:30 AM Diaz Roche MD [facility] DERMSUR [address]   9/1/2022  9:00 AM Eric Rivera PA-C [facility] UROGYN [facility]   9/6/2022  1:30 PM Seng Salgado MD [facility] CARDIO [place]   9/26/2022  8:15 AM LAB, [facility] [place]   10/3/2022  9:00 AM Elidia Madison NP [facility] ENDOC [place]        06/27/22 1146   Post-Acute Status   Post-Acute Authorization Placement   Discharge Plan   Discharge Plan A [facility]

## 2022-06-27 NOTE — PT/OT/SLP PROGRESS
Occupational Therapy   Treatment    Name: Myesha Quinones  MRN: [id number]  Admitting Diagnosis:  Acute respiratory failure with hypoxia       Recommendations:     Discharge Recommendations: home health OT, home health PT (and family assistance)  Discharge Equipment Recommendations:  none  Barriers to discharge:  None    Assessment:   Patient requiring increased (A) for all tasks this date. Patient deconditioned and will benefit from HH OT/PT upon D/C home.     Myesha Quinones is a 82 y.o. female with a medical diagnosis of Acute respiratory failure with hypoxia.  Performance deficits affecting function are weakness, impaired endurance, impaired self care skills, impaired functional mobilty, gait instability, impaired balance, decreased upper extremity function, decreased lower extremity function, impaired cardiopulmonary response to activity, impaired coordination.     Rehab Prognosis:  Good and Fair; patient would benefit from acute skilled OT services to address these deficits and reach maximum level of function.       Plan:     Patient to be seen 3 x/week to address the above listed problems via self-care/home management, therapeutic activities, therapeutic exercises  · Plan of Care Expires: 07/22/22  · Plan of Care Reviewed with: patient, son    Subjective     Pain/Comfort:  · Pain Rating 1: 0/10    Objective:     Communicated with: Asmita hubbard prior to session.  Patient found HOB elevated with telemetry, peripheral IV, oxygen, lauren catheter upon OT entry to room.    General Precautions: Standard, airborne, contact, droplet, fall     Bed Mobility:    · Patient completed Scooting/Bridging with contact guard assistance  · Patient completed Supine to Sit with minimum assistance  · Patient completed Sit to Supine with contact guard assistance     Functional Mobility/Transfers:  · Patient completed Sit <> Stand Transfer with minimum assistance  with  rolling walker     Activities of Daily  Living:  · Grooming: supervision form EOB  · Toileting: lauren catheter      [facility] 6 Click ADL: 16    Treatment & Education:  Patient reporting increased fatigue, but agreeable to therapy. Increased time, effort and VCs for all tasks/transitions. Patient sat EOB and reported some dizziness, but recovered quickly. Patient completed G/H tasks as above. Requesting Boost; nsg notified. Patient instructed in sit > stand using RW -- increased c/o dizziness upon upright standing, but improved /p ~15 seconds and PLB. Patient ambulated short bout in room using RW /c Maurice and VCs for RW mgmt/proximity. Patient returned to EOB sitting and scoot laterally to HOB before returning to supine. Discussed post-acute therapy and DME needs; all questions answered within OT scope of practice.     Patient left HOB elevated with all lines intact, call button in reach and son presentEducation:      GOALS:   Multidisciplinary Problems     Occupational Therapy Goals        Problem: Occupational Therapy    Goal Priority Disciplines Outcome Interventions   Occupational Therapy Goal     OT, PT/OT Ongoing, Progressing    Description: Goals to be met by: 7/22     Patient will increase functional independence with ADLs by performing:    UE Dressing with Modified [place].  LE Dressing with Modified [place].  Grooming while standing at sink with Modified [place].  Toileting from toilet with Modified [place] for hygiene and clothing management.   Toilet transfer to toilet with Modified [place].  Upper extremity exercise program x10 reps per handout, with independence.  Pt will demo good understanding of energy conservation/work simplification                     Time Tracking:     OT Date of Treatment: 06/27/22  OT Start Time: 1459  OT Stop Time: 1528  OT Total Time (min): 29 min    Billable Minutes:Self Care/Home Management 15  Therapeutic Activity 14    OT/CRISS: CRISS     CRISS Visit Number: 1    6/27/2022

## 2022-06-28 LAB
ANION GAP SERPL CALC-SCNC: 14 MMOL/L (ref 8–16)
BACTERIA UR CULT: NORMAL
BACTERIA UR CULT: NORMAL
BUN SERPL-MCNC: 61 MG/DL (ref 8–23)
CALCIUM SERPL-MCNC: 8.6 MG/DL (ref 8.7–10.5)
CHLORIDE SERPL-SCNC: 91 MMOL/L (ref 95–110)
CO2 SERPL-SCNC: 31 MMOL/L (ref 23–29)
CREAT SERPL-MCNC: 1.9 MG/DL (ref 0.5–1.4)
D DIMER PPP IA.FEU-MCNC: 3.14 MG/L FEU
ERYTHROCYTE [DISTWIDTH] IN BLOOD BY AUTOMATED COUNT: 14.3 % (ref 11.5–14.5)
EST. GFR  (AFRICAN AMERICAN): 28 ML/MIN/1.73 M^2
EST. GFR  (NON AFRICAN AMERICAN): 24 ML/MIN/1.73 M^2
FERRITIN SERPL-MCNC: 153 NG/ML (ref 20–300)
GLUCOSE SERPL-MCNC: 55 MG/DL (ref 70–110)
HCT VFR BLD AUTO: 25.1 % (ref 37–48.5)
HGB BLD-MCNC: 8 G/DL (ref 12–16)
LDH SERPL L TO P-CCNC: 179 U/L (ref 110–260)
MCH RBC QN AUTO: 28.1 PG (ref 27–31)
MCHC RBC AUTO-ENTMCNC: 31.9 G/DL (ref 32–36)
MCV RBC AUTO: 88 FL (ref 82–98)
PHOSPHATE SERPL-MCNC: 4 MG/DL (ref 2.7–4.5)
PLATELET # BLD AUTO: 253 K/UL (ref 150–450)
PMV BLD AUTO: 10.4 FL (ref 9.2–12.9)
POCT GLUCOSE: 122 MG/DL (ref 70–110)
POCT GLUCOSE: 172 MG/DL (ref 70–110)
POCT GLUCOSE: 277 MG/DL (ref 70–110)
POCT GLUCOSE: 318 MG/DL (ref 70–110)
POCT GLUCOSE: 51 MG/DL (ref 70–110)
POCT GLUCOSE: 77 MG/DL (ref 70–110)
POTASSIUM SERPL-SCNC: 3.6 MMOL/L (ref 3.5–5.1)
RBC # BLD AUTO: 2.85 M/UL (ref 4–5.4)
SODIUM SERPL-SCNC: 136 MMOL/L (ref 136–145)
WBC # BLD AUTO: 10.39 K/UL (ref 3.9–12.7)

## 2022-06-28 PROCEDURE — 27000221 HC OXYGEN, UP TO 24 HOURS

## 2022-06-28 PROCEDURE — 85379 FIBRIN DEGRADATION QUANT: CPT

## 2022-06-28 PROCEDURE — 94760 N-INVAS EAR/PLS OXIMETRY 1: CPT

## 2022-06-28 PROCEDURE — 27000207 HC ISOLATION

## 2022-06-28 PROCEDURE — 36415 COLL VENOUS BLD VENIPUNCTURE: CPT | Performed by: HOSPITALIST

## 2022-06-28 PROCEDURE — 80048 BASIC METABOLIC PNL TOTAL CA: CPT | Performed by: NURSE PRACTITIONER

## 2022-06-28 PROCEDURE — 94761 N-INVAS EAR/PLS OXIMETRY MLT: CPT

## 2022-06-28 PROCEDURE — 63600175 PHARM REV CODE 636 W HCPCS: Performed by: NURSE PRACTITIONER

## 2022-06-28 PROCEDURE — 84100 ASSAY OF PHOSPHORUS: CPT | Performed by: HOSPITALIST

## 2022-06-28 PROCEDURE — 85027 COMPLETE CBC AUTOMATED: CPT | Performed by: NURSE PRACTITIONER

## 2022-06-28 PROCEDURE — 25000003 PHARM REV CODE 250

## 2022-06-28 PROCEDURE — 94640 AIRWAY INHALATION TREATMENT: CPT

## 2022-06-28 PROCEDURE — 97535 SELF CARE MNGMENT TRAINING: CPT | Mod: CO

## 2022-06-28 PROCEDURE — 83615 LACTATE (LD) (LDH) ENZYME: CPT

## 2022-06-28 PROCEDURE — 63600175 PHARM REV CODE 636 W HCPCS

## 2022-06-28 PROCEDURE — 97116 GAIT TRAINING THERAPY: CPT

## 2022-06-28 PROCEDURE — 36415 COLL VENOUS BLD VENIPUNCTURE: CPT

## 2022-06-28 PROCEDURE — 97164 PT RE-EVAL EST PLAN CARE: CPT

## 2022-06-28 PROCEDURE — 99900035 HC TECH TIME PER 15 MIN (STAT)

## 2022-06-28 PROCEDURE — 82728 ASSAY OF FERRITIN: CPT

## 2022-06-28 PROCEDURE — 97530 THERAPEUTIC ACTIVITIES: CPT | Mod: CO

## 2022-06-28 PROCEDURE — 36415 COLL VENOUS BLD VENIPUNCTURE: CPT | Performed by: NURSE PRACTITIONER

## 2022-06-28 PROCEDURE — 97161 PT EVAL LOW COMPLEX 20 MIN: CPT

## 2022-06-28 PROCEDURE — 94660 CPAP INITIATION&MGMT: CPT

## 2022-06-28 PROCEDURE — 11000001 HC ACUTE MED/SURG PRIVATE ROOM

## 2022-06-28 PROCEDURE — 25000003 PHARM REV CODE 250: Performed by: NURSE PRACTITIONER

## 2022-06-28 RX ORDER — INSULIN ASPART 100 [IU]/ML
2 INJECTION, SOLUTION INTRAVENOUS; SUBCUTANEOUS
Status: DISCONTINUED | OUTPATIENT
Start: 2022-06-28 | End: 2022-07-01 | Stop reason: HOSPADM

## 2022-06-28 RX ADMIN — ALBUTEROL SULFATE 2 PUFF: 90 AEROSOL, METERED RESPIRATORY (INHALATION) at 08:06

## 2022-06-28 RX ADMIN — FUROSEMIDE 40 MG: 40 TABLET ORAL at 09:06

## 2022-06-28 RX ADMIN — MUPIROCIN: 20 OINTMENT TOPICAL at 09:06

## 2022-06-28 RX ADMIN — OXYCODONE HYDROCHLORIDE AND ACETAMINOPHEN 500 MG: 500 TABLET ORAL at 09:06

## 2022-06-28 RX ADMIN — POLYETHYLENE GLYCOL 3350 17 G: 17 POWDER, FOR SOLUTION ORAL at 09:06

## 2022-06-28 RX ADMIN — ALBUTEROL SULFATE 2 PUFF: 90 AEROSOL, METERED RESPIRATORY (INHALATION) at 12:06

## 2022-06-28 RX ADMIN — SODIUM BICARBONATE 1300 MG: 650 TABLET ORAL at 08:06

## 2022-06-28 RX ADMIN — OXYCODONE HYDROCHLORIDE AND ACETAMINOPHEN 500 MG: 500 TABLET ORAL at 08:06

## 2022-06-28 RX ADMIN — SODIUM BICARBONATE 1300 MG: 650 TABLET ORAL at 02:06

## 2022-06-28 RX ADMIN — FLUTICASONE FUROATE AND VILANTEROL TRIFENATATE 1 PUFF: 100; 25 POWDER RESPIRATORY (INHALATION) at 08:06

## 2022-06-28 RX ADMIN — INSULIN ASPART 3 UNITS: 100 INJECTION, SOLUTION INTRAVENOUS; SUBCUTANEOUS at 04:06

## 2022-06-28 RX ADMIN — CARVEDILOL 25 MG: 25 TABLET, FILM COATED ORAL at 09:06

## 2022-06-28 RX ADMIN — HYDRALAZINE HYDROCHLORIDE 100 MG: 25 TABLET, FILM COATED ORAL at 09:06

## 2022-06-28 RX ADMIN — HYDRALAZINE HYDROCHLORIDE 100 MG: 25 TABLET, FILM COATED ORAL at 08:06

## 2022-06-28 RX ADMIN — MONTELUKAST 10 MG: 10 TABLET, FILM COATED ORAL at 08:06

## 2022-06-28 RX ADMIN — FLUTICASONE PROPIONATE 100 MCG: 50 SPRAY, METERED NASAL at 09:06

## 2022-06-28 RX ADMIN — THERA TABS 1 TABLET: TAB at 09:06

## 2022-06-28 RX ADMIN — MUPIROCIN: 20 OINTMENT TOPICAL at 08:06

## 2022-06-28 RX ADMIN — TIOTROPIUM BROMIDE 18 MCG: 18 CAPSULE ORAL; RESPIRATORY (INHALATION) at 08:06

## 2022-06-28 RX ADMIN — GUAIFENESIN AND DEXTROMETHORPHAN HYDROBROMIDE 1 TABLET: 600; 30 TABLET, EXTENDED RELEASE ORAL at 08:06

## 2022-06-28 RX ADMIN — CARVEDILOL 25 MG: 25 TABLET, FILM COATED ORAL at 08:06

## 2022-06-28 RX ADMIN — SODIUM BICARBONATE 1300 MG: 650 TABLET ORAL at 09:06

## 2022-06-28 RX ADMIN — ENOXAPARIN SODIUM 60 MG: 100 INJECTION SUBCUTANEOUS at 04:06

## 2022-06-28 RX ADMIN — ALBUTEROL SULFATE 2 PUFF: 90 AEROSOL, METERED RESPIRATORY (INHALATION) at 01:06

## 2022-06-28 RX ADMIN — INSULIN ASPART 4 UNITS: 100 INJECTION, SOLUTION INTRAVENOUS; SUBCUTANEOUS at 11:06

## 2022-06-28 RX ADMIN — FUROSEMIDE 40 MG: 40 TABLET ORAL at 05:06

## 2022-06-28 RX ADMIN — GUAIFENESIN AND DEXTROMETHORPHAN HYDROBROMIDE 1 TABLET: 600; 30 TABLET, EXTENDED RELEASE ORAL at 09:06

## 2022-06-28 RX ADMIN — INSULIN ASPART 2 UNITS: 100 INJECTION, SOLUTION INTRAVENOUS; SUBCUTANEOUS at 04:06

## 2022-06-28 RX ADMIN — PRAVASTATIN SODIUM 40 MG: 40 TABLET ORAL at 09:06

## 2022-06-28 RX ADMIN — POLYETHYLENE GLYCOL 3350 17 G: 17 POWDER, FOR SOLUTION ORAL at 08:06

## 2022-06-28 NOTE — SUBJECTIVE & OBJECTIVE
Interval History: Feels the same today. Still with congested cough and SOB and fatigue with exertion.     Review of Systems   Constitutional:  Positive for fatigue. Negative for chills and fever.   Respiratory:  Positive for cough and shortness of breath. Negative for wheezing.    Cardiovascular:  Negative for chest pain, palpitations and leg swelling.   Gastrointestinal:  Negative for abdominal pain, nausea and vomiting.   Musculoskeletal:  Negative for myalgias.   Neurological:  Positive for weakness. Negative for dizziness and headaches.   Objective:     Vital Signs (Most Recent):  Temp: 97 °F (36.1 °C) (06/28/22 1129)  Pulse: 73 (06/28/22 1129)  Resp: 19 (06/28/22 1129)  BP: (!) 148/65 (06/28/22 1129)  SpO2: 96 % (06/28/22 1129)   Vital Signs (24h Range):  Temp:  [96.4 °F (35.8 °C)-99.1 °F (37.3 °C)] 97 °F (36.1 °C)  Pulse:  [68-90] 73  Resp:  [15-20] 19  SpO2:  [91 %-100 %] 96 %  BP: (124-183)/(54-73) 148/65     Weight: 59.5 kg (131 lb 2.8 oz)  Body mass index is 23.24 kg/m².    Intake/Output Summary (Last 24 hours) at 6/28/2022 1310  Last data filed at 6/28/2022 1000  Gross per 24 hour   Intake 460 ml   Output 2200 ml   Net -1740 ml        Physical Exam  Constitutional:       General: She is not in acute distress.     Appearance: She is not diaphoretic.   HENT:      Head: Normocephalic and atraumatic.      Nose: Congestion present. No rhinorrhea.      Mouth/Throat:      Mouth: Mucous membranes are moist.   Eyes:      Conjunctiva/sclera: Conjunctivae normal.   Cardiovascular:      Rate and Rhythm: Normal rate.      Heart sounds: No murmur heard.  Pulmonary:      Effort: No respiratory distress.      Breath sounds: Rales present. No wheezing.      Comments: De [place] Cruz draining yellow clear urine  Chest:      Chest wall: No tenderness.   Abdominal:      General: Bowel sounds are normal.      Palpations: Abdomen is soft.      Tenderness: There is no abdominal tenderness. There is no right CVA tenderness or left CVA  tenderness.   Musculoskeletal:         General: No tenderness.      Cervical back: Neck supple.      Right lower leg: No edema.      Left lower leg: No edema.   Skin:     General: Skin is warm and dry.   Neurological:      Mental Status: She is alert and oriented to person, place, and time.      Motor: Weakness present.   Psychiatric:         Mood and Affect: Mood normal.       Significant Labs: All pertinent labs within the past 24 hours have been reviewed.    Significant Imaging: I have reviewed all pertinent imaging results/findings within the past 24 hours.

## 2022-06-28 NOTE — PT/OT/SLP EVAL
Physical Therapy Evaluation    Patient Name:  Myesha Quinones   MRN:  [id number]    Recommendations:     Discharge Recommendations:  other (see comments) (post acute placement)   Discharge Equipment Recommendations: none   Barriers to discharge: Increased mob needs, decreasd endurance    Assessment:     Myesha Quinones is a 82 y.o. female admitted with a medical diagnosis of Acute respiratory failure with hypoxia.  She presents with the following impairments/functional limitations:  weakness, impaired endurance, impaired self care skills, impaired functional mobilty, gait instability, impaired balance, impaired cardiopulmonary response to activity . Upon arrival, pt was up in chair on 3L/min O2. Pt was CGA sit<>stand w/ RW and amb 16ft w/ RW, decreased endurance.     Rehab Prognosis: Good; patient would benefit from acute skilled PT services to address these deficits and reach maximum level of function.    Recent Surgery: * No surgery found *      Plan:     During this hospitalization, patient to be seen 3 x/week to address the identified rehab impairments via gait training, therapeutic activities, therapeutic exercises, neuromuscular re-education and progress toward the following goals:    · Plan of Care Expires:  07/28/22    Subjective     Chief Complaint: None stated  Patient/Family Comments/goals: Return home  Pain/Comfort:  · Pain Rating 1: 0/10    Patients cultural, spiritual, Yarsanism conflicts given the current situation: no    Living Environment:  Pt lives alone in 1SH. No steps upon entry.   Prior to admission, patients level of function was Independent.  Equipment used at home: cane, quad, shower chair, rollator, grab bar.  DME owned (not currently used): none.  Upon discharge, patient will have assistance from Family.    Objective:     Communicated with Catia MEDINA prior to session.  Patient found up in chair with oxygen, telemetry, lauren catheter  upon PT entry to room.    General Precautions:  Standard, fall, airborne, contact, droplet   Orthopedic Precautions:N/A   Braces: N/A  Respiratory Status: Nasal cannula, flow 3 L/min    Exams:  · Cognitive Exam:  Patient is oriented to Person, Place, Time and Situation  · Gross Motor Coordination:  WFL  · Postural Exam:  Patient presented with the following abnormalities:    · -       No postural abnormalities identified  · RLE ROM: WFL  · RLE Strength: WFL  · LLE ROM: WFL  · LLE Strength: WFL    Functional Mobility:  · Transfers:     · Sit to Stand:  contact guard assistance with rolling walker  · Gait: 16ft w/ RW, decreased endurance    Therapeutic Activities and Exercises:   Importance of mob, safety awareness, gait training for increased endurance and functional mob    AM-PAC 6 CLICK MOBILITY  Total Score:21     Patient left up in chair with all lines intact, call button in reach and RN notified.    GOALS:   Multidisciplinary Problems     Physical Therapy Goals        Problem: Physical Therapy    Goal Priority Disciplines Outcome Goal Variances Interventions   Physical Therapy Goal     PT, PT/OT      Description: Goals to be met by: Discharge     Patient will increase functional independence with mobility by performin. Supine to sit with [place]  2. Sit to supine with [place]  3. Sit to stand transfer with Modified [place]  4. Gait  x 150 feet with Modified [place] using Rolling Walker.                      History:     Past Medical History:   Diagnosis Date    Acute hypoxemic respiratory failure 2019    Acute right-sided thoracic back pain 2019    Allergy     Asthma     Basal cell carcinoma     left forehead    Basal cell carcinoma     left nose    Basal cell carcinoma 2015    right nose    Basal cell carcinoma 2015    left lower post neck    Basal cell carcinoma 2019    left ant scalp     Bilateral renal cysts     Breast cancer     CAD (coronary artery disease)     Cardiomyopathy      Cardiomyopathy, ischemic     Cataract     CHF (congestive heart failure)     CKD (chronic kidney disease) stage 4, GFR 15-29 ml/min     Colon polyp 2011    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, with long-term current use of insulin 02/22/2018    COPD (chronic obstructive pulmonary disease)     COPD exacerbation 04/08/2018    Current mild episode of major depressive disorder without prior episode 01/25/2022    Defibrillator discharge     Diabetes mellitus     Diabetes mellitus type II     Diabetes with neurologic complications     Edema     Goiter     MNG    Hematuria, unspecified     HX: breast cancer     Hyperlipidemia     Hypertension     Hypocalcemia     Hypokalemia     Hyponatremia     Hyponatremia 4/2/2022    Iron deficiency anemia 05/16/2017    Iron deficiency anemia     Left kidney mass     Meningioma     Microalbuminuria due to type 2 diabetes mellitus 01/26/2022    Osteoporosis, postmenopausal     Pneumonia 12/08/2019    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    Proteinuria 01/21/2019    Pseudomonas pneumonia     Skin cancer     s/p excision    Sleep apnea     CPAP    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/2015    mid forehead    Unspecified vitamin D deficiency     UTI (urinary tract infection) 04/02/2022    Ventricular tachycardia     Vitamin B12 deficiency     Vitamin D deficiency disease        Past Surgical History:   Procedure Laterality Date    BASAL CELL CARCINOMA EXCISION      posterior neck and nose    BREAST BIOPSY      BREAST CYST EXCISION Left     BREAST SURGERY      CARDIAC DEFIBRILLATOR PLACEMENT      x 2    CATARACT EXTRACTION W/  INTRAOCULAR LENS IMPLANT Bilateral     CHOLECYSTECTOMY      COLONOSCOPY N/A 11/5/2019    Procedure: COLONOSCOPY;  Surgeon: Boaz Botello MD;  Location: [facility] ([address] [place] [address]);  Service: Endoscopy;  Laterality: N/A;  [facility] -      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 EP LAB;  Service: Cardiology;  Laterality: N/A;  DONNA, CRT-D gen sulema, MDT, MAC, SK, 3 Prep    REVISION OF SKIN POCKET FOR CARDIOVERTER-DEFIBRILLATOR  12/17/2018    Procedure: Revision, Skin Pocket, For Cardioverter-Defibrillator;  Surgeon: Jan Mckeon MD;  Location: [facility] EP LAB;  Service: Cardiology;;    SQUAMOUS CELL CARCINOMA EXCISION      remved from forehead    TONSILLECTOMY         Time Tracking:     PT Received On: 06/28/22  PT Start Time: 1446     PT Stop Time: 1511  PT Total Time (min): 25 min     Billable Minutes: Re-eval 10 and Gait Training 15      06/28/2022

## 2022-06-28 NOTE — PROGRESS NOTES
"[facility] Medicine  Progress Note    Patient Name: Myesha Quinones  MRN: [id number]  Patient Class: IP- Inpatient   Admission Date: 6/20/2022  Length of Stay: 6 days  Attending Physician: Yrn Sheppard MD  Primary Care Provider: Catrachita Rothman MD        Subjective:     Principal Problem:Acute respiratory failure with hypoxia        HPI:  Myesha Quinones has a past medical history of CAD, combined systolic and diastolic heart failure, LBBB, biventricular ICD, CKD4, IDDM, breast cancer, HTN, HLD, severe persistent asthma, COPD, and HLD presents to [facility] ED with complaints of SOB. She states her SOB is associated with a productive cough. She states her SOB has been progressively worsened over the past two weeks. She reports she was seen by her pulmonologist and was prescribed antibiotic and prednisone which she started last Tuesday. She states she did not complete her antibiotics or steroid therapy. She denies fever, chest pain, palpitations, N/V, or diarrhea.     She was seen by her pulmonolgist, Dr. Maki on 6/13/22 for shortness of breath. Reported cough and congestion attributing to sinus infection. She was also noted to have a current MAC infection which she is not on therapy. She was started on 10 day course of Augmentin with 10mg PO prednisone to accompany.     Of Note: She was primarily followed by ID, Dr. Amaya, for ABPA (allergic bronchopulmonary aspergillosis) and was started on Voriconazole therapy in which she did not tolerate.       In the ED: BP (!) 165/107   Pulse 83   Temp 98 °F (36.7 °C) (Axillary)   Resp (!) 22   Ht 5' 3" (1.6 m)   Wt 59 kg (130 lb)   LMP  (LMP Unknown)   SpO2 (!) 94%   BMI 23.03 kg/m² Labs revealed WBC 13.35, H/H 9/27.8, BUN 64, creatinine 2.8, Glucose 161, . Troponin 0.015. 12 lead EKG revealed Normal sinus rhythm, Left axis deviation, Right bundle branch block. She is Covid positive. CXR revealed diffuse pulmonary edema with a right " small pleural effusion. She received Duo-neb tx x1, Lasix 40mg IV x1, and Prednisone 40mg PO x1. ID consulted to follow.         Overview/Hospital Course:  Patient presented to the hospital for SOB evaluation.  She was admitted to the hospital medicine for acute on chronic respiratory failure secondary to COVID-19 infection and CHF exacerbation. Patient was treated with COVID-19 protocol with remdesivir/steroid, supportive care with oxygen, and started on IV lasix. Patient did not tolerate steroid therapy and developed HHS which required short-time ICU stay. She was evaluated by ID and was placed on 5 day course of IV cefepime due to hx of MAC/ABPA and pseudomonas found on recent sputum culture. Pulmonology was consulted for bilateral pleural effusion. Bedside US revealed small bilateral effusion no pocket large enough for therapeutic thoracentesis. Pulmonology recommended continue diuresis therapy. 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 Per Urology, continuing lauren upon discharge and they would see her in clinic after 1-2 weeks for void trials. Patient was gradually clinically improved after finished antibiotic/antiviral courses and was able to switch to oral lasix. PT/OT recommended HH upon discharge      Interval History: Feels the same today. Still with congested cough and SOB and fatigue with exertion.     Review of Systems   Constitutional:  Positive for fatigue. Negative for chills and fever.   Respiratory:  Positive for cough and shortness of breath. Negative for wheezing.    Cardiovascular:  Negative for chest pain, palpitations and leg swelling.   Gastrointestinal:  Negative for abdominal pain, nausea and vomiting.   Musculoskeletal:  Negative for myalgias.   Neurological:  Positive for weakness. Negative for dizziness and headaches.   Objective:     Vital Signs (Most Recent):  Temp: 97 °F (36.1 °C) (06/28/22 1129)  Pulse: 73 (06/28/22 1129)  Resp:  19 (06/28/22 1129)  BP: (!) 148/65 (06/28/22 1129)  SpO2: 96 % (06/28/22 1129)   Vital Signs (24h Range):  Temp:  [96.4 °F (35.8 °C)-99.1 °F (37.3 °C)] 97 °F (36.1 °C)  Pulse:  [68-90] 73  Resp:  [15-20] 19  SpO2:  [91 %-100 %] 96 %  BP: (124-183)/(54-73) 148/65     Weight: 59.5 kg (131 lb 2.8 oz)  Body mass index is 23.24 kg/m².    Intake/Output Summary (Last 24 hours) at 6/28/2022 1310  Last data filed at 6/28/2022 1000  Gross per 24 hour   Intake 460 ml   Output 2200 ml   Net -1740 ml        Physical Exam  Constitutional:       General: She is not in acute distress.     Appearance: She is not diaphoretic.   HENT:      Head: Normocephalic and atraumatic.      Nose: Congestion present. No rhinorrhea.      Mouth/Throat:      Mouth: Mucous membranes are moist.   Eyes:      Conjunctiva/sclera: Conjunctivae normal.   Cardiovascular:      Rate and Rhythm: Normal rate.      Heart sounds: No murmur heard.  Pulmonary:      Effort: No respiratory distress.      Breath sounds: Rales present. No wheezing.      Comments: De La Cruz draining yellow clear urine  Chest:      Chest wall: No tenderness.   Abdominal:      General: Bowel sounds are normal.      Palpations: Abdomen is soft.      Tenderness: There is no abdominal tenderness. There is no right CVA tenderness or left CVA tenderness.   Musculoskeletal:         General: No tenderness.      Cervical back: Neck supple.      Right lower leg: No edema.      Left lower leg: No edema.   Skin:     General: Skin is warm and dry.   Neurological:      Mental Status: She is alert and oriented to person, place, and time.      Motor: Weakness present.   Psychiatric:         Mood and Affect: Mood normal.       Significant Labs: All pertinent labs within the past 24 hours have been reviewed.    Significant Imaging: I have reviewed all pertinent imaging results/findings within the past 24 hours.      Assessment/Plan:      * Acute respiratory failure with hypoxia  -Progressive dyspnea over the  past two weeks  -may be in the setting of acute COPD exacerbation, ADHF, and COVID-19 infection  -Procalcitonin negative  -COVID-19 protocol initiated - On Remdesivir  -D.Dimer 2.38; may be multifactorial as stated above  -L >R LLE edema; bilateral LLE US - No DVTnoted  -Initiate Lovenox 1mg/Kg daily in setting of VIRGILIO  - Breathing treatment  - IV lasix - hold due to HHS  - Supplemental oxygen titrate as needed  - Continuous pulse ox      Patient with Hypoxic Respiratory failure which is Acute.  she is not on home oxygen. Supplemental ventilation was provided and noted-  .   Differential diagnosis includes - CHF, COPD, Pleural effusion and COVID-19 infection, MAC, chronic Aspergillosis Labs and images were reviewed. Patient Has not has a recent ABG. Will treat underlying causes and adjust management of respiratory failure as above    - weaning oxygen  - Volume overload - switch to po lasix  - Consult Pulmonary for pulmonary effusion    Hyperosmolar hyperglycemic state (HHS)  -Patient is with evidence of HHS given hyperglycemia, elevated anion gap metabolic acidosis, glucosuria,  -Etiology likely 2/2 high dose steroid use  - Received IVF bolus 1L over night  - Received regular insulin bolus and started on weight-based insulin infusion until the anion-gap returns to normal and blood glucose is stabilized.    -Hourly AccuCheks  -check BMPs every 4 hours. Will replace potassium as necessary.    -Will start basal-bolus insulin therapy thereafter as well as an oral diet.    -Resolved      Acute on chronic combined systolic and diastolic CHF (congestive heart failure)  Patient presents with shortness of breath and PELAYO. Bibasilar rales and peripheral edema on exam; L >R edema  - consistent clinical presentation;   -Troponin 0.015; likely demand; will continue to monitor and trend  -CXR revealed diffuse pulmonary edema with right small pleural effusion  - initiated lasix 40mg IV  in ED, continue at 40mg IV q12hr.  Switch to po lasix   - continue home BB, will hold ARB in setting of VIRGILIO  - Daily weights  -Strict I/Os  - Monitor on telemetry  - Monitor and trend BMP, Mg, and renal function; keep K >4, Mg >2  -Sodium restriction (<2g/d), fluid restriction (<2L)   - 2D echo to assess cardiac function and valvular dysfunction.     Results for orders placed during the hospital encounter of 06/20/22    Echo    Interpretation Summary  · The left ventricle is normal in size with concentric hypertrophy and normal systolic function.  · The estimated ejection fraction is 55%.  · Grade II left ventricular diastolic dysfunction.  · Normal right ventricular size with normal right ventricular systolic function.  · Severe left atrial enlargement.  · Mild mitral regurgitation.  · Mild to moderate tricuspid regurgitation.  · Intermediate central venous pressure (8 mmHg).  · The estimated PA systolic pressure is 56 mmHg.  · There is pulmonary hypertension.  · There is a left pleural effusion.      Intake/Output Summary (Last 24 hours) at 6/28/2022 1334  Last data filed at 6/28/2022 1325  Gross per 24 hour   Intake 700 ml   Output 2500 ml   Net -1800 ml       - Plan to switch to po lasix if CXR improving        COVID-19 virus infection  - COVID positive 6/20/22; initiated on protocol  - Isolation: Airborne/Droplet. Surgical mask on patient. Notify Infection Control  - CXR with diffuse pulmonary edema with small right pleural effusion, requiring O2  - Monitor on Telemetry  -Received 40mg PO Prednisone x1 in ED   - initiated on remdesivir x5d - completed  - CRP pending; D-dimer 2.38  -Covid Labs pending  - Order RT consult via Respiratory Communication for COVID Protocols  - Incentive Spirometer Q4h to promote lung compliance   - Continuous Pulse Oximetry, goal SpO2 92-96%  - supplemental O2 PRN, will wean as tolerated  - Albuterol INH Q6h scheduled & PRN   - MVI & ascorbic acid 500mg PO BID  -Anti-tussives for cough  - Acetaminophen Q6hr PRN  fever/headache  - Loperamide PRN viral diarrhea  - VTE PPx: enoxaparin 1mg/kg q24h in setting of VIRGILIO  - PT/OT for debility/weakness  - If deterioration, may warrant trial of NIPPV in neg pressure room or immediate ICU consult  - Not able to treat with dexamethasone course due to uncontrolled BG up to 700s/HHS  - Continue support care  - weaning oxygen        Chronic obstructive pulmonary disease with acute exacerbation  Clinically-stable.  Afebrile. Elevation of WBC, Productive Cough.    --Received Duo-neb x1 and 40mg PO Prednisone x1 in ED  --Will continue home regimen of maintenance inhalers  -resume home montelukast and Flonase   --Albuterol INH q6 scheduled in setting of COVID-19 infection  --PRN Oxygen per Nasal Cannula for SpO2 <90%  --Pulse-Ox Monitoring every 4 hours  --Monitor respiratory status closely        Acute kidney injury superimposed on chronic kidney disease  Estimated Creatinine Clearance: 18.6 mL/min (A) (based on SCr of 1.9 mg/dL (H)).  Baseline Cr: 2.1Baseline BUN:46   Recent Labs   Lab 06/26/22  0502 06/26/22  0755 06/27/22  0356 06/27/22  0910 06/28/22  0401 06/28/22  0830   CO2 29  --  31*  --  31*  --    BUN 73*  --  62*  --  61*  --    CREATININE 2.1*  --  2.0*  --  1.9*  --    PHOS  --  4.7*  --  4.2  --  4.0       -Etiology likely 2/2 in setting of ADHF and dehydration with HHS  -Received IVF bolus 1L. Will hold diuresis with IV Lasix  -Monitor strict urine output  - Continue to monitor renal function with daily labs and trend electrolytes  - renally dose medications  - avoid nephrotoxic agents including NSAIDs, aminoglycosides, IV contrast (unless absolutely necessary), gadolinium, fleets and other phosphorous-based laxatives  - monitor events that may lead to decreased renal perfusion (hypovolemia, hypotension, sepsis)  - monitor urine output to assure that no obstruction precipitates worsening in GFR    - improving        UTI (urinary tract infection)  Presenting with urinary  "retention. UA in ED with leukocytes. Urine culture 6/1/22 prior admission grew Acinetobacter Baumannii  > 100,000 cfu/ml  - Off Cipro due to prolong QTc. Now on Cefepime for pneumonia  - F/u urine culture - No growth      Type 2 diabetes mellitus, with long-term current use of insulin  Hemoglobin A1C   Date Value Ref Range Status   02/22/2022 5.9 (H) 4.0 - 5.6 % Final     -Serum glucose on admit 161  - hold home oral medications  - LDSSI with ACCUcheck ACHS  - Diabetic diet  -Hypoglycemia protocol PRN  -Monitor closely and adjust insulin regimen as clinically indicated to achieve levels of 140-180 during hospitalization        Essential hypertension  -Resume home BB and hydralazine; hold ARB in setting of VIRGILIO  -Monitor and trend vital signs q4hr   -Anti-hypertensives PRN for SBP >180      Mycobacterium avium complex  See above      ABPA (allergic bronchopulmonary aspergillosis)  Mycobacterium avium complex    -Followed by ID, Dr. Amaya; was placed on Voriconazole therapy but unable to tolerate  -Presents to ED with worsening SOB not improved with recent trial of Augmentin and Prednisone  -Procalcitonin normal  -ID consulted to evaluate and assist in antibiotic administration. Per ID "Pt can be colonized with pseudomonas- had recurrent PsA in past in sputum- but can consider 5 day couse but not more than 1gm IV daily since Cr Cl was 13.9"  -Start Cefepime 1 g q24h for 5 days - Completed      Urinary retention  Hx with urinary retention due to prolapse required lauren in the past. Bladder scanned for 1240 on admission  - Lauren placed in ED  - Failed voiding trials 6/26. Now back with Lauren  - Urology consulted who recommends keep Lauren, no further urologic intervention needed at current, and f/u outpatient 1-2 weeks for void trials      Hyperlipidemia associated with type 2 diabetes mellitus  Last LDL was   Lab Results   Component Value Date    LDLCALC 29.8 (L) 04/03/2022      Patient is chronically on statin.will " continue for now.   Monitor clinically.          Dyspnea  -Progressive dyspnea over the past two weeks  -may be in the setting of acute COPD exacerbation, ADHF, and COVID-19 infection  -Procalcitonin pending  -COVID-19 protocol initiated  -D.Dimer 2.38; may be multifactorial as stated above  -L >R LLE edema; bilateral LLE US - No DVTnoted  -Will initiate Lovenox 1mg/Kg daily in setting of VIRGILIO      Biventricular ICD (implantable cardioverter-defibrillator) in place  -Noted      VTE Risk Mitigation (From admission, onward)         Ordered     enoxaparin injection 60 mg  Every 24 hours (non-standard times)         06/20/22 1608     IP VTE HIGH RISK PATIENT  Once         06/20/22 1603     Place sequential compression device  Until discontinued         06/20/22 1603                Discharge Planning   ALIX: 6/22/2022     Code Status: Full Code   Is the patient medically ready for discharge?:     Reason for patient still in hospital (select all that apply): Patient trending condition  Discharge Plan A: Home Health   Discharge Delays: None known at this time              Malissa Gray NP  Department of Hospital Medicine   [place] [facility]

## 2022-06-28 NOTE — HOSPITAL COURSE
Patient presented to the hospital for SOB evaluation.  She was admitted to the hospital medicine for acute on chronic respiratory failure secondary to COVID-19 infection and CHF exacerbation. Patient was treated with COVID-19 protocol with remdesivir/steroid, supportive care with oxygen, and started on IV lasix. Patient did not tolerate steroid therapy and developed HHS which required short-time ICU stay. She was evaluated by ID and was placed on 5 day course of IV cefepime due to hx of MAC/ABPA and pseudomonas found on recent sputum culture. Pulmonology was consulted for bilateral pleural effusion. Bedside US revealed small bilateral effusion no pocket large enough for therapeutic thoracentesis. Pulmonology recommended continue diuresis therapy. 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 Per Urology, continuing lauren upon discharge and they would see her in clinic after 1-2 weeks for void trials. Patient was gradually clinically improved after finished antibiotic/antiviral courses and was able to switch to oral lasix. PT/OT recommended HH upon discharge--working on skilled nursing per family request. Patients family states they do not like any of the skilled facilities that have accepted the patient. We will move forward with DC to home with HH as requested by the family. Will keep lauren catheter in place 22 retention. Urology consult has been placed for outpatient follow up.

## 2022-06-28 NOTE — PHARMACY MED REC
"[facility]           Pharmacy  Admission Medication Reconciliation     The home medication history was taken by Destinee Rg PharmD.      Medication history obtained from Medications listed below were obtained from: Patient/family    Based on information gathered for medication list, you may go to "Admission" then "Reconcile Home Medications" tabs to review and/or act upon those items.      The home medication list has been updated by the Pharmacy department.    Please read ALL comments highlighted in yellow.    Please address this information as you see fit.     Feel free to contact us if you have any questions or require assistance.    The current inpatient medication list has been compared to the home medication list and the following discrepancies were noted:     Patient reports he/she IS TAKING the following which was not ordered upon admit  o Vit D3 2000U daily  o Tradjenta 5mg daily  o Mg Ox 400mg daily  o Omega 3 fatty acids 500mg twice daily  o Prednisone 10mg daily  o Valsartan 80mg twice daily      No current facility-administered medications on file prior to encounter.     Current Outpatient Medications on File Prior to Encounter   Medication Sig Dispense Refill    albuterol (PROVENTIL/VENTOLIN HFA) 90 mcg/actuation inhaler INHALE 2 PUFFS INTO THE LUNGS EVERY 6 (SIX) HOURS AS NEEDED FOR WHEEZING. RESCUE 18 g 12    albuterol-ipratropium (DUO-NEB) 2.5 mg-0.5 mg/3 mL nebulizer solution TAKE 3 MLS BY NEBULIZATION EVERY 4 (FOUR) HOURS AS NEEDED FOR WHEEZING. 270 mL 12    benzonatate (TESSALON) 100 MG capsule Take 1 capsule (100 mg total) by mouth 3 (three) times daily as needed for Cough. 20 capsule 0    blood sugar diagnostic (ACCU-CHEK HECTOR) Strp Uses Accu-Check Hector meter to test BG 4x/day 400 strip 6    carvediloL (COREG) 25 MG tablet TAKE 2 TABLETS (50 MG TOTAL) BY MOUTH 2 (TWO) TIMES DAILY. 360 tablet 3    cholecalciferol, vitamin D3, (VITAMIN D3) 50 mcg (2,000 " "unit) Tab Take 1 tablet by mouth once daily.       estradioL (ESTRACE) 0.01 % (0.1 mg/gram) vaginal cream Place 1 g vaginally every Mon, Wed, Fri. 153 g 6    fluticasone propionate (FLONASE) 50 mcg/actuation nasal spray 2 sprays (100 mcg total) by Each Nostril route once daily. (Patient taking differently: 2 sprays by Each Nostril route daily as needed.) 16 g 12    fluticasone-salmeterol diskus inhaler 250-50 mcg Inhale 1 puff into the lungs as needed. Controller      furosemide (LASIX) 40 MG tablet Take 1 tablet (40 mg total) by mouth every 8 (eight) hours as needed (swelling). 90 tablet 3    hydrALAZINE (APRESOLINE) 100 MG tablet Take 1 tablet (100 mg total) by mouth 2 (two) times daily. 180 tablet 3    insulin (LANTUS SOLOSTAR U-100 INSULIN) glargine 100 units/mL (3mL) SubQ pen Inject 22 Units into the skin once daily. 30 mL 3    lancets (ACCU-CHEK SOFTCLIX LANCETS) Misc Uses Accu-Chek Angelica meter to test BG 1x/day 400 each 6    linaGLIPtin (TRADJENTA) 5 mg Tab tablet Take 1 tablet (5 mg total) by mouth once daily. 90 tablet 3    magnesium oxide (MAG-OX) 400 mg tablet Take 1 tablet (400 mg total) by mouth once daily.  0    montelukast (SINGULAIR) 10 mg tablet Take 1 tablet (10 mg total) by mouth every evening. (Patient taking differently: Take 10 mg by mouth daily as needed.) 90 tablet 3    nitroGLYCERIN (NITROSTAT) 0.4 MG SL tablet Place 0.4 mg under the tongue every 5 (five) minutes as needed for Chest pain.       omega-3 fatty acids 500 mg Cap Take 1 capsule by mouth Twice daily.      pen needle, diabetic (BD ULTRA-FINE MINI PEN NEEDLE) 31 gauge x 3/16" Ndle USE WITH LANTUS AT BEDTIME 400 each 3    pravastatin (PRAVACHOL) 40 MG tablet TAKE 1 TABLET BY MOUTH EVERY DAY 90 tablet 1    predniSONE (DELTASONE) 10 MG tablet Take 1 tablet (10 mg total) by mouth once daily. for 10 days 10 tablet 0    pulse oximeter (PULSE OXIMETER) device by Apply Externally route 2 (two) times a day. Use twice daily at " 8 AM and 3 PM and record the value in [employer] as directed. 1 each 0    sodium bicarbonate 650 MG tablet Take 2 tablets (1,300 mg total) by mouth 3 (three) times daily. 180 tablet 11    tiotropium (SPIRIVA) 18 mcg inhalation capsule Inhale 1 capsule (18 mcg total) into the lungs once daily. Controller 90 capsule 3    valsartan (DIOVAN) 80 MG tablet Take 1 tablet (80 mg total) by mouth 2 (two) times daily. 180 tablet 3    levocetirizine (XYZAL) 5 MG tablet Take 5 mg by mouth as needed.      polyethylene glycol (GLYCOLAX) 17 gram PwPk Take 17 g by mouth daily as needed (constipation). 10 each 1    sodium chloride 3% 3 % nebulizer solution TAKE 4 MLS BY NEBULIZATION 4 (FOUR) TIMES DAILY AS NEEDED FOR OTHER OR COUGH (TO INDUCE SPUTUM AND CLEAR MUCOUS.). 750 mL 11    [DISCONTINUED] amoxicillin-clavulanate 875-125mg (AUGMENTIN) 875-125 mg per tablet Take 1 tablet by mouth every 12 (twelve) hours. for 10 days 20 tablet 0    [DISCONTINUED] furosemide (LASIX) 80 MG tablet TAKE 1 TABLET BY MOUTH EVERY 8 HOURS AS NEEDED FOR SWELLING         Please address this information as you see fit.  Feel free to contact us if you have any questions or require assistance.    Destinee Rg, PharmD  [phone]                  .

## 2022-06-28 NOTE — PLAN OF CARE
Pt on documented O2, no respiratory distress noted. Will continue to monitor.   The proper method of use, as well as anticipated side effects, of this metered-dose inhaler are discussed and demonstrated to the patient.

## 2022-06-28 NOTE — CONSULTS
Pulmonary Consult Note    Inpatient consult to Pulmonology  Consult performed by: Spencer Carrillo MD  Consult ordered by: Malissa Gray NP  Reason for consult: Pleural effusion  Assessment/Recommendations:     1. Bilateral pleural effusion  - small, simple appearing on bedside US, in setting of diastolic heart failure  - no pocket large enough for therapeutic thoracentesis  - patient improving with current therapy, continue diuresis as ordered    2. Grade II diastolic heart failure  - see above  - consider addition of SGLT-2 inhibitor given diabetes and heart failure    3. Covid PNA  - s/p 5d remdesivir  - s/p 1x dose dex (discontinued due to significant hyperglycemia)    4. ABPA  - followed by pulm and ID outpatient at [facility]  - attempted therapy with vori in the past, did not tolerate  - unable to treat with prednisone due to diabetes, heart failure and MAC  - continue outpatient follow up    5. MAC  - followed by pulm and ID outpatient at [facility]  - has had negative sputum smear/culture on 4/5 and negative smear (pending final culture) on 6/14  - no indication for initiation of therapy at this time, continue follow up with pulmonary outpatient  - would order acapella for improved airway clearance    6. Asthma  - continue ICS/LABA and singulair as ordered    Patient discussed with Dr. Patterson. Pulmonary to sign off at this time. Please reach out with further questions or concerns.    Spencer Carrillo MD  Pulmonary / Critical Care, PGY-5        SUBJECTIVE:     History of Present Illness:  82F with CAD, diastolic heart failure, LBBB, biventricular ICD, CKD4, IDDM, breast cancer, HTN, HLD, severe persistent asthma, ABPA, pulmonary MAC, HLD admitted to hospital medicine 6/20 for acute on chronic heart failure and covid PNA. Has been aggressively diuresed with 12L net negative UOP since admission. Also completed therapy with remdesivir and 1x dose dex (further steroids discontinued due to  significant hyperglycemia). Patient reports significant improvement in her symptoms since being admitted. Pulmonary consulted to evaluate pleural effusions and need for thoracentesis.    Evaluated at bedside, patient sitting in bedside chair, comfortable, no distress, no tachypnea. She is on 2L NC and speaking in full sentences. Very hard of hearing. States she feels much better since being in the hospital. Has a dry cough, no LE edema. Bedside US of bilateral lungs completed showing small BL pleural effusions, too small for thoracentesis. Cardiac US completed showing normal EF, no pericardial effusion, and a dilated, non-collapsible IVC. No visible JVD and no LE edema.    Review of patient's allergies indicates:   Allergen Reactions    Iodine and iodide containing products Hives    Nifedipine      weakness     Past Medical History:   Diagnosis Date    Acute hypoxemic respiratory failure 12/19/2019    Acute right-sided thoracic back pain 12/09/2019    Allergy     Asthma     Basal cell carcinoma     left forehead    Basal cell carcinoma     left nose    Basal cell carcinoma 05/20/2015    right nose    Basal cell carcinoma 12/22/2015    left lower post neck    Basal cell carcinoma 12/03/2019    left ant scalp     Bilateral renal cysts     Breast cancer     CAD (coronary artery disease)     Cardiomyopathy     Cardiomyopathy, ischemic     Cataract     CHF (congestive heart failure)     CKD (chronic kidney disease) stage 4, GFR 15-29 ml/min     Colon polyp 2011    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, with long-term current use of insulin 02/22/2018    COPD (chronic obstructive pulmonary disease)     COPD exacerbation 04/08/2018    Current mild episode of major depressive disorder without prior episode 01/25/2022    Defibrillator discharge     Diabetes mellitus     Diabetes mellitus type II     Diabetes with  neurologic complications     Edema     Goiter     MNG    Hematuria, unspecified     HX: breast cancer     Hyperlipidemia     Hypertension     Hypocalcemia     Hypokalemia     Hyponatremia     Hyponatremia 4/2/2022    Iron deficiency anemia 05/16/2017    Iron deficiency anemia     Left kidney mass     Meningioma     Microalbuminuria due to type 2 diabetes mellitus 01/26/2022    Osteoporosis, postmenopausal     Pneumonia 12/08/2019    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    Proteinuria 01/21/2019    Pseudomonas pneumonia     Skin cancer     s/p excision    Sleep apnea     CPAP    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/2015    mid forehead    Unspecified vitamin D deficiency     UTI (urinary tract infection) 04/02/2022    Ventricular tachycardia     Vitamin B12 deficiency     Vitamin D deficiency disease      Past Surgical History:   Procedure Laterality Date    BASAL CELL CARCINOMA EXCISION      posterior neck and nose    BREAST BIOPSY      BREAST CYST EXCISION Left     BREAST SURGERY      CARDIAC DEFIBRILLATOR PLACEMENT      x 2    CATARACT EXTRACTION W/  INTRAOCULAR LENS IMPLANT Bilateral     CHOLECYSTECTOMY      COLONOSCOPY N/A 11/5/2019    Procedure: COLONOSCOPY;  Surgeon: Boaz Botello MD;  Location: [facility] ENDO ([address] [place] [address]);  Service: Endoscopy;  Laterality: N/A;  AIC - [employer] -     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    FRACTURE SURGERY      left elbow and wrist as a child    HYSTERECTOMY      MASTECTOMY Right     REPLACEMENT OF IMPLANTABLE CARDIOVERTER-DEFIBRILLATOR (ICD) GENERATOR N/A 12/17/2018    Procedure: REPLACEMENT, ICD GENERATOR;  Surgeon: Jan Mckeon MD;  Location: [facility] EP LAB;  Service: Cardiology;  Laterality: N/A;  DONNA, CRT-D gen change, MDT, MAC, SK, 3 Prep    REVISION OF SKIN POCKET FOR CARDIOVERTER-DEFIBRILLATOR  12/17/2018    Procedure: Revision, Skin Pocket, For Cardioverter-Defibrillator;  Surgeon: Jan Mckeon MD;  Location: [facility] EP  LAB;  Service: Cardiology;;    SQUAMOUS CELL CARCINOMA EXCISION      remved from forehead    TONSILLECTOMY       Family History   Problem Relation Age of Onset    Diabetes Father     Heart disease Father     Diabetes Sister     Heart disease Sister     Diabetes Brother     Heart disease Brother     Hypertension Brother     Diabetes Brother     Heart disease Brother     Hypertension Brother     Diabetes Brother     Heart disease Brother     Cancer Brother         colon    Diabetes Brother     Cancer Son         skin    Diabetes Son         prediabetes    Diabetes Daughter         prediabetes    Cancer Daughter         melanoma    Obesity Daughter     Melanoma Daughter     Diabetes Son     Asthma Mother     Hypertension Mother     Stroke Mother     No Known Problems Maternal Grandmother     No Known Problems Maternal Grandfather     No Known Problems Paternal Grandmother     No Known Problems Paternal Grandfather     Amblyopia Neg Hx     Blindness Neg Hx     Cataracts Neg Hx     Glaucoma Neg Hx     Macular degeneration Neg Hx     Retinal detachment Neg Hx     Strabismus Neg Hx     Thyroid disease Neg Hx      Social History     Tobacco Use    Smoking status: Never Smoker    Smokeless tobacco: Never Used   Substance Use Topics    Alcohol use: No     Alcohol/week: 0.0 standard drinks    Drug use: No     Review of Systems   Constitutional: Negative for chills, fever and weight loss.   HENT: Negative for sinus pain.    Eyes: Negative for blurred vision and double vision.   Respiratory: Positive for cough. Negative for hemoptysis, sputum production, shortness of breath, wheezing and stridor.    Cardiovascular: Positive for orthopnea. Negative for chest pain, palpitations and leg swelling.   Gastrointestinal: Negative for constipation, diarrhea, nausea and vomiting.   Genitourinary: Negative for dysuria and hematuria.   Skin: Negative for itching and rash.   Neurological: Negative  for focal weakness and weakness.     OBJECTIVE:     Vital Signs:  Temp:  [96.4 °F (35.8 °C)-97 °F (36.1 °C)]   Pulse:  [68-88]   Resp:  [17-20]   BP: (148-156)/(65-68)   SpO2:  [94 %-100 %]     Physical Exam  Constitutional:       General: She is not in acute distress.     Appearance: Normal appearance. She is not ill-appearing.   HENT:      Head: Normocephalic and atraumatic.      Mouth/Throat:      Mouth: Mucous membranes are moist.      Pharynx: Oropharynx is clear.   Eyes:      Extraocular Movements: Extraocular movements intact.      Pupils: Pupils are equal, round, and reactive to light.   Neck:      Comments: No appreciable JVD  Cardiovascular:      Rate and Rhythm: Normal rate and regular rhythm.   Pulmonary:      Effort: Pulmonary effort is normal. No respiratory distress.      Breath sounds: No stridor. Rales (scattered, worse at bases) present. No wheezing or rhonchi.   Abdominal:      Palpations: Abdomen is soft.      Tenderness: There is no abdominal tenderness. There is no guarding.   Musculoskeletal:         General: No deformity. Normal range of motion.      Cervical back: Normal range of motion and neck supple.      Right lower leg: No edema.      Left lower leg: No edema.   Skin:     General: Skin is warm and dry.   Neurological:      General: No focal deficit present.      Mental Status: She is alert and oriented to person, place, and time. Mental status is at baseline.   Psychiatric:         Mood and Affect: Mood normal.         Behavior: Behavior normal.       Laboratory:  I have reviewed all pertinent lab results within the past 24 hours.  CBC:   Recent Labs   Lab 06/28/22  0401   WBC 10.39   RBC 2.85*   HGB 8.0*   HCT 25.1*      MCV 88   MCH 28.1   MCHC 31.9*     BMP:   Recent Labs   Lab 06/28/22  0401   GLU 55*      K 3.6   CL 91*   CO2 31*   BUN 61*   CREATININE 1.9*   CALCIUM 8.6*       Diagnostic Results:  X-Ray: Reviewed  CT: Reviewed    Chest xrays during this admission and  CT scan from 2/26 independently reviewed and appreciated by myself.    ASSESSMENT/PLAN:     See above    Pt seen and examined with Pulmonary/Critical Care team and this note reviewed and validated with the following additional comments:    Bilateral small effusions that come and go. Highly suggestive of transudates due to CHF.  I do not feel that sampling now is necessary but instead would treat heart failure.  Will discuss with primary.    Alvin Ervin MD  Phone [phone]

## 2022-06-28 NOTE — ASSESSMENT & PLAN NOTE
-Progressive dyspnea over the past two weeks  -may be in the setting of acute COPD exacerbation, ADHF, and COVID-19 infection  -Procalcitonin negative  -COVID-19 protocol initiated - On Remdesivir  -D.Dimer 2.38; may be multifactorial as stated above  -L >R LLE edema; bilateral LLE US - No DVTnoted  -Initiate Lovenox 1mg/Kg daily in setting of VIRGILIO  - Breathing treatment  - IV lasix - hold due to HHS  - Supplemental oxygen titrate as needed  - Continuous pulse ox      Patient with Hypoxic Respiratory failure which is Acute.  she is not on home oxygen. Supplemental ventilation was provided and noted-  .   Differential diagnosis includes - CHF, COPD, Pleural effusion and COVID-19 infection, MAC, chronic Aspergillosis Labs and images were reviewed. Patient Has not has a recent ABG. Will treat underlying causes and adjust management of respiratory failure as above    - weaning oxygen  - Volume overload - switch to po lasix  - Consult Pulmonary for pulmonary effusion

## 2022-06-28 NOTE — ASSESSMENT & PLAN NOTE
Estimated Creatinine Clearance: 18.6 mL/min (A) (based on SCr of 1.9 mg/dL (H)).  Baseline Cr: 2.1Baseline BUN:46   Recent Labs   Lab 06/26/22  0502 06/26/22  0755 06/27/22  0356 06/27/22  0910 06/28/22  0401 06/28/22  0830   CO2 29  --  31*  --  31*  --    BUN 73*  --  62*  --  61*  --    CREATININE 2.1*  --  2.0*  --  1.9*  --    PHOS  --  4.7*  --  4.2  --  4.0       -Etiology likely 2/2 in setting of ADHF and dehydration with HHS  -Received IVF bolus 1L. Will hold diuresis with IV Lasix  -Monitor strict urine output  - Continue to monitor renal function with daily labs and trend electrolytes  - renally dose medications  - avoid nephrotoxic agents including NSAIDs, aminoglycosides, IV contrast (unless absolutely necessary), gadolinium, fleets and other phosphorous-based laxatives  - monitor events that may lead to decreased renal perfusion (hypovolemia, hypotension, sepsis)  - monitor urine output to assure that no obstruction precipitates worsening in GFR    - improving

## 2022-06-28 NOTE — ASSESSMENT & PLAN NOTE
Patient presents with shortness of breath and PELAYO. Bibasilar rales and peripheral edema on exam; L >R edema  - consistent clinical presentation;   -Troponin 0.015; likely demand; will continue to monitor and trend  -CXR revealed diffuse pulmonary edema with right small pleural effusion  - initiated lasix 40mg IV  in ED, continue at 40mg IV q12hr. Switch to po lasix   - continue home BB, will hold ARB in setting of VIRGILIO  - Daily weights  -Strict I/Os  - Monitor on telemetry  - Monitor and trend BMP, Mg, and renal function; keep K >4, Mg >2  -Sodium restriction (<2g/d), fluid restriction (<2L)   - 2D echo to assess cardiac function and valvular dysfunction.     Results for orders placed during the hospital encounter of 06/20/22    Echo    Interpretation Summary  · The left ventricle is normal in size with concentric hypertrophy and normal systolic function.  · The estimated ejection fraction is 55%.  · Grade II left ventricular diastolic dysfunction.  · Normal right ventricular size with normal right ventricular systolic function.  · Severe left atrial enlargement.  · Mild mitral regurgitation.  · Mild to moderate tricuspid regurgitation.  · Intermediate central venous pressure (8 mmHg).  · The estimated PA systolic pressure is 56 mmHg.  · There is pulmonary hypertension.  · There is a left pleural effusion.      Intake/Output Summary (Last 24 hours) at 6/28/2022 1334  Last data filed at 6/28/2022 1325  Gross per 24 hour   Intake 700 ml   Output 2500 ml   Net -1800 ml       - Plan to switch to po lasix if CXR improving

## 2022-06-28 NOTE — PLAN OF CARE
[facility]. ABELARDO will follow.     ABELARDO called [facility], earnestine will call ABELARDO back. ABELARDO sent Mary Durant a referral for sitters. ABELARDO will follow.    DIDIER Garcia  [phone]     06/28/22 1013   Post-Acute Status   Post-Acute Authorization Home Health   Home Health Status Referrals Sent

## 2022-06-28 NOTE — PLAN OF CARE
Problem: Occupational Therapy  Goal: Occupational Therapy Goal  Description: Goals to be met by: 7/22     Patient will increase functional independence with ADLs by performing:    UE Dressing with Modified [place].  LE Dressing with Modified [place].  Grooming while standing at sink with Modified [place].  Toileting from toilet with Modified [place] for hygiene and clothing management.   Toilet transfer to toilet with Modified [place].  Upper extremity exercise program x10 reps per handout, with independence.  Pt will demo good understanding of energy conservation/work simplification    Outcome: Ongoing, Progressing     Patient demo'd mobility with CGA and use of RW this date and required one instance of Maurice for balance in stance at sink. Reports improvement in breathing. Patient still presents as deconditioned and /c decreased endurance for simple tasks. May benefit from post-acute therapy placement, if she is not able to have more family assistance at home.

## 2022-06-28 NOTE — PT/OT/SLP PROGRESS
Occupational Therapy   Treatment    Name: Myesha Quinones  MRN: [id number]  Admitting Diagnosis:  Acute respiratory failure with hypoxia       Recommendations:     Discharge Recommendations: home health PT, home health OT (Could benefit from post-acute therapy placement, as she does live alone)  Discharge Equipment Recommendations:  none  Barriers to discharge:  None    Assessment:   Patient demo'd mobility with CGA and use of RW this date and required one instance of [place] for balance in stance at sink. Reports improvement in breathing. Patient still presents as deconditioned and /c decreased endurance for simple tasks. May benefit from post-acute therapy placement, if she is not able to have more family assistance at home, along /c HH OT/PT.    Myesha Quinones is a 82 y.o. female with a medical diagnosis of Acute respiratory failure with hypoxia. Performance deficits affecting function are weakness, impaired endurance, impaired self care skills, impaired functional mobilty, impaired cardiopulmonary response to activity, gait instability, impaired balance, decreased coordination, decreased upper extremity function, decreased lower extremity function, impaired coordination, decreased safety awareness    Rehab Prognosis:  Good; patient would benefit from acute skilled OT services to address these deficits and reach maximum level of function.       Plan:     Patient to be seen 3 x/week to address the above listed problems via self-care/home management, therapeutic activities, therapeutic exercises  · Plan of Care Expires: 07/22/22  · Plan of Care Reviewed with: patient    Subjective     Pain/Comfort:  · Pain Rating 1: 0/10  · Pain Rating Post-Intervention 1: 0/10    Objective:     Communicated with: Catia hubbard prior to session.  Patient found up in chair with telemetry, oxygen, lauren catheter upon OT entry to room.    General Precautions: Standard, airborne, contact, droplet, fall     Bed Mobility:    · N/A;  chose to return to bedside chair after therapy    Functional Mobility/Transfers:  · Patient completed Sit <> Stand Transfer with contact guard assistance  with  rolling walker and VCs for technique and safety     Activities of Daily Living:  · Grooming: set-up and CGA for balance in stance, but did require one instance of [place] 2/2 sway while performing tasks    · Toileting: lauren catheter      [facility] 6 Click ADL: 17    Treatment & Education:  Patient reported feeling slightly better this date and agreeable to therapy. Patient instructed in sit > stand using RW -- requires cues for RW mgmt/proximity, as she will tend to discard or leave behind during tasks. Patient ambulated to sink for various G/H tasks in stance (see above). Patient initially wanting to go to bed, but then changed mind and reported she wanted to return to chair. VCs for safe descent into chair. Patient again educated on energy conservation and pursed lip breathing; verbalized understanding.     Patient left up in chair with all lines intact, call button in reach and nsg notifiedEducation:      GOALS:   Multidisciplinary Problems     Occupational Therapy Goals        Problem: Occupational Therapy    Goal Priority Disciplines Outcome Interventions   Occupational Therapy Goal     OT, PT/OT Ongoing, Progressing    Description: Goals to be met by: 7/22     Patient will increase functional independence with ADLs by performing:    UE Dressing with Modified [place].  LE Dressing with Modified [place].  Grooming while standing at sink with Modified [place].  Toileting from toilet with Modified [place] for hygiene and clothing management.   Toilet transfer to toilet with Modified [place].  Upper extremity exercise program x10 reps per handout, with independence.  Pt will demo good understanding of energy conservation/work simplification                     Time Tracking:     OT Date of Treatment: 06/28/22  OT Start Time: 1420  OT Stop  Time: 1445  OT Total Time (min): 25 min    Billable Minutes:Self Care/Home Management 15  Therapeutic Activity 10    OT/CRISS: CRISS     CRISS Visit Number: 2    6/28/2022

## 2022-06-28 NOTE — PLAN OF CARE
SW called pt's daughter Betsy [phone] to discuss d/c planning. Pt's daughter is fine with her mother coming home with HH if pt does not meet criteria for SNF. Pt is approved to receive HH with [facility]. SW will watch for pt/ot recs. SW will follow.     Berto Sierra, MSW  [phone]    Future Appointments   Date Time Provider Department Center   7/1/2022  9:00 AM Ev Jiménez MD [facility] IMPRI [facility]   7/7/2022 11:00 AM CARRIE Arechiga MD [facility] OPHTHAL [facility]   7/11/2022  2:00 PM Leonides Fuentes MD [facility] DERM [facility]   7/12/2022 10:00 AM [facility] CT1 LIMIT 450 LBS [facility] CT SCAN [facility]   7/26/2022 11:00 AM Catrachita Rothman MD [facility] IM [facility] PCW   8/6/2022 10:00 AM HOME MONITOR DEVICE CHECK, [facility] NOM [facility]PRO [facility]   8/23/2022  9:45 AM Mansi Borrero MD [facility]   8/25/2022 11:30 AM Diaz Roche MD [facility] DERMSUR [facility]   9/1/2022  9:00 AM Eric Rivera PA-C [facility] UROGYN [facility]   9/6/2022  1:30 PM Seng Salgado MD [facility] CARDIO [place]   9/26/2022  8:15 AM LAB, [facility]   10/3/2022  9:00 AM Elidia Madison NP [facility] ENDOC [place]        06/28/22 1305   Post-Acute Status   Post-Acute Authorization Home Health   Home Health Status Set-up Complete/Auth obtained   Hospital Resources/Appts/Education Provided Appointments scheduled and added to AVS   Discharge Plan   Discharge Plan A Home Health

## 2022-06-29 ENCOUNTER — PATIENT OUTREACH (OUTPATIENT)
Dept: ADMINISTRATIVE | Facility: OTHER | Age: 82
End: 2022-06-29
Payer: MEDICARE

## 2022-06-29 DIAGNOSIS — Z09 NEED FOR CASE MANAGEMENT FOLLOW-UP: Primary | ICD-10-CM

## 2022-06-29 LAB
PHOSPHATE SERPL-MCNC: 3.8 MG/DL (ref 2.7–4.5)
POCT GLUCOSE: 215 MG/DL (ref 70–110)
POCT GLUCOSE: 272 MG/DL (ref 70–110)
POCT GLUCOSE: 302 MG/DL (ref 70–110)
POCT GLUCOSE: 83 MG/DL (ref 70–110)

## 2022-06-29 PROCEDURE — 97535 SELF CARE MNGMENT TRAINING: CPT | Mod: CO

## 2022-06-29 PROCEDURE — 94660 CPAP INITIATION&MGMT: CPT

## 2022-06-29 PROCEDURE — 99900035 HC TECH TIME PER 15 MIN (STAT)

## 2022-06-29 PROCEDURE — 11000001 HC ACUTE MED/SURG PRIVATE ROOM

## 2022-06-29 PROCEDURE — 97110 THERAPEUTIC EXERCISES: CPT | Mod: CO

## 2022-06-29 PROCEDURE — 27000207 HC ISOLATION

## 2022-06-29 PROCEDURE — 84100 ASSAY OF PHOSPHORUS: CPT | Performed by: HOSPITALIST

## 2022-06-29 PROCEDURE — 63600175 PHARM REV CODE 636 W HCPCS

## 2022-06-29 PROCEDURE — 25000003 PHARM REV CODE 250: Performed by: NURSE PRACTITIONER

## 2022-06-29 PROCEDURE — 94640 AIRWAY INHALATION TREATMENT: CPT

## 2022-06-29 PROCEDURE — 25000003 PHARM REV CODE 250

## 2022-06-29 PROCEDURE — 36415 COLL VENOUS BLD VENIPUNCTURE: CPT | Performed by: HOSPITALIST

## 2022-06-29 PROCEDURE — 94761 N-INVAS EAR/PLS OXIMETRY MLT: CPT

## 2022-06-29 PROCEDURE — 27000221 HC OXYGEN, UP TO 24 HOURS

## 2022-06-29 RX ORDER — ALBUTEROL SULFATE 90 UG/1
2 AEROSOL, METERED RESPIRATORY (INHALATION) EVERY 4 HOURS PRN
Status: DISCONTINUED | OUTPATIENT
Start: 2022-06-29 | End: 2022-07-01 | Stop reason: HOSPADM

## 2022-06-29 RX ORDER — GUAIFENESIN 100 MG/5ML
200 SOLUTION ORAL EVERY 4 HOURS PRN
Status: DISCONTINUED | OUTPATIENT
Start: 2022-06-29 | End: 2022-07-01 | Stop reason: HOSPADM

## 2022-06-29 RX ADMIN — SODIUM BICARBONATE 1300 MG: 650 TABLET ORAL at 09:06

## 2022-06-29 RX ADMIN — INSULIN ASPART 2 UNITS: 100 INJECTION, SOLUTION INTRAVENOUS; SUBCUTANEOUS at 05:06

## 2022-06-29 RX ADMIN — INSULIN ASPART 4 UNITS: 100 INJECTION, SOLUTION INTRAVENOUS; SUBCUTANEOUS at 11:06

## 2022-06-29 RX ADMIN — ALBUTEROL SULFATE 2 PUFF: 90 AEROSOL, METERED RESPIRATORY (INHALATION) at 08:06

## 2022-06-29 RX ADMIN — GUAIFENESIN AND DEXTROMETHORPHAN HYDROBROMIDE 1 TABLET: 600; 30 TABLET, EXTENDED RELEASE ORAL at 08:06

## 2022-06-29 RX ADMIN — INSULIN ASPART 2 UNITS: 100 INJECTION, SOLUTION INTRAVENOUS; SUBCUTANEOUS at 11:06

## 2022-06-29 RX ADMIN — ALBUTEROL SULFATE 2 PUFF: 90 AEROSOL, METERED RESPIRATORY (INHALATION) at 12:06

## 2022-06-29 RX ADMIN — ACETAMINOPHEN 650 MG: 325 TABLET ORAL at 11:06

## 2022-06-29 RX ADMIN — OXYCODONE HYDROCHLORIDE AND ACETAMINOPHEN 500 MG: 500 TABLET ORAL at 09:06

## 2022-06-29 RX ADMIN — GUAIFENESIN AND DEXTROMETHORPHAN HYDROBROMIDE 1 TABLET: 600; 30 TABLET, EXTENDED RELEASE ORAL at 09:06

## 2022-06-29 RX ADMIN — CARVEDILOL 25 MG: 25 TABLET, FILM COATED ORAL at 08:06

## 2022-06-29 RX ADMIN — CARVEDILOL 25 MG: 25 TABLET, FILM COATED ORAL at 09:06

## 2022-06-29 RX ADMIN — SODIUM BICARBONATE 1300 MG: 650 TABLET ORAL at 02:06

## 2022-06-29 RX ADMIN — ACETAMINOPHEN 650 MG: 325 TABLET ORAL at 10:06

## 2022-06-29 RX ADMIN — HYDRALAZINE HYDROCHLORIDE 100 MG: 25 TABLET, FILM COATED ORAL at 09:06

## 2022-06-29 RX ADMIN — FLUTICASONE FUROATE AND VILANTEROL TRIFENATATE 1 PUFF: 100; 25 POWDER RESPIRATORY (INHALATION) at 08:06

## 2022-06-29 RX ADMIN — FLUTICASONE PROPIONATE 100 MCG: 50 SPRAY, METERED NASAL at 09:06

## 2022-06-29 RX ADMIN — POLYETHYLENE GLYCOL 3350 17 G: 17 POWDER, FOR SOLUTION ORAL at 09:06

## 2022-06-29 RX ADMIN — ENOXAPARIN SODIUM 60 MG: 100 INJECTION SUBCUTANEOUS at 05:06

## 2022-06-29 RX ADMIN — FUROSEMIDE 40 MG: 40 TABLET ORAL at 09:06

## 2022-06-29 RX ADMIN — BENZONATATE 100 MG: 100 CAPSULE ORAL at 01:06

## 2022-06-29 RX ADMIN — SODIUM BICARBONATE 1300 MG: 650 TABLET ORAL at 08:06

## 2022-06-29 RX ADMIN — HYDRALAZINE HYDROCHLORIDE 10 MG: 20 INJECTION, SOLUTION INTRAMUSCULAR; INTRAVENOUS at 06:06

## 2022-06-29 RX ADMIN — THERA TABS 1 TABLET: TAB at 09:06

## 2022-06-29 RX ADMIN — FUROSEMIDE 40 MG: 40 TABLET ORAL at 05:06

## 2022-06-29 RX ADMIN — MONTELUKAST 10 MG: 10 TABLET, FILM COATED ORAL at 08:06

## 2022-06-29 RX ADMIN — GUAIFENESIN 200 MG: 200 SOLUTION ORAL at 05:06

## 2022-06-29 RX ADMIN — HYDRALAZINE HYDROCHLORIDE 100 MG: 25 TABLET, FILM COATED ORAL at 08:06

## 2022-06-29 RX ADMIN — INSULIN ASPART 1 UNITS: 100 INJECTION, SOLUTION INTRAVENOUS; SUBCUTANEOUS at 08:06

## 2022-06-29 RX ADMIN — TIOTROPIUM BROMIDE 18 MCG: 18 CAPSULE ORAL; RESPIRATORY (INHALATION) at 08:06

## 2022-06-29 RX ADMIN — OXYCODONE HYDROCHLORIDE AND ACETAMINOPHEN 500 MG: 500 TABLET ORAL at 08:06

## 2022-06-29 RX ADMIN — POLYETHYLENE GLYCOL 3350 17 G: 17 POWDER, FOR SOLUTION ORAL at 08:06

## 2022-06-29 RX ADMIN — PRAVASTATIN SODIUM 40 MG: 40 TABLET ORAL at 09:06

## 2022-06-29 NOTE — PLAN OF CARE
Problem: Occupational Therapy  Goal: Occupational Therapy Goal  Description: Goals to be met by: 7/22     Patient will increase functional independence with ADLs by performing:    UE Dressing with Modified [place].  LE Dressing with Modified [place].  Grooming while standing at sink with Modified [place].  Toileting from toilet with Modified [place] for hygiene and clothing management.   Toilet transfer to toilet with Modified [place].  Upper extremity exercise program x10 reps per handout, with independence.  Pt will demo good understanding of energy conservation/work simplification    Outcome: Ongoing, Progressing     Patient making progress towards goals and will benefit from HH OT/PT upon D/C. Would also benefit from family assistance at home.

## 2022-06-29 NOTE — PLAN OF CARE
SW spoke with [place] and Pt at bedside to discuss placement. Pt stated that she does not get chemo or radiation. Pt and family would still be interested in finding placement. SW will follow.    Berto Sierra, MSW  [phone]    Future Appointments   Date Time Provider Department Center   7/1/2022  9:00 AM Ev Jiménez MD [facility] IMPRI [facility]   7/7/2022 11:00 AM CARRIE Arechiga MD [facility] OPHTHAL [facility]   7/11/2022  2:00 PM Leonides Fuentes MD [facility] DERM [facility]   7/12/2022 10:00 AM [facility] CT1 LIMIT 450 LBS [facility] CT SCAN [facility]   7/26/2022 11:00 AM Catrachita Rothman MD [facility] IM [facility] PCW   8/6/2022 10:00 AM HOME MONITOR DEVICE CHECK, [facility] ARRHPRO [facility]   8/23/2022  9:45 AM Mansi Borrero MD [facility]   8/25/2022 11:30 AM Diaz Roche MD [facility] DERMSUR [facility]   9/1/2022  9:00 AM Eric Rivera PA-C [facility] UROGYN [facility]   9/6/2022  1:30 PM Seng Salgado MD [facility] CARDIO [place]   9/26/2022  8:15 AM LAB, [facility] LAB [place]   10/3/2022  9:00 AM Elidia Madison NP [facility] ENDOC [place]        06/29/22 1623   Post-Acute Status   Post-Acute Authorization Placement

## 2022-06-29 NOTE — PT/OT/SLP PROGRESS
Occupational Therapy   Treatment    Name: Myesha Quinones  MRN: [id number]  Admitting Diagnosis:  Acute respiratory failure with hypoxia       Recommendations:     Discharge Recommendations: home health OT, home health PT  Discharge Equipment Recommendations:  none  Barriers to discharge:  None    Assessment:     Myesha Quinones is a 82 y.o. female with a medical diagnosis of Acute respiratory failure with hypoxia.  Performance deficits affecting function are weakness, impaired endurance, impaired self care skills, impaired functional mobilty, gait instability, impaired balance, impaired cardiopulmonary response to activity.     Rehab Prognosis:  Good; patient would benefit from acute skilled OT services to address these deficits and reach maximum level of function.       Plan:     Patient to be seen 3 x/week to address the above listed problems via self-care/home management, therapeutic activities, therapeutic exercises  · Plan of Care Expires: 07/22/22  · Plan of Care Reviewed with: patient, son    Subjective     Pain/Comfort:  · Pain Rating 1: 0/10  · Pain Rating Post-Intervention 1: 0/10    Objective:     Communicated with: nurseCatia prior to session.  Patient found up in chair with telemetry, oxygen, lauren catheter upon OT entry to room.    General Precautions: Standard, airborne, contact, droplet, fall     Functional Mobility/Transfers:  · Patient completed Sit <> Stand Transfer with stand by assistance  with  rolling walker     Activities of Daily Living:  · Grooming: stand by assistance in stance at sink    [facility] 6 Click ADL: 18    Treatment & Education:  Patient agreeable to therapy. Instructed in sit > stand using RW and ambulated to sink for various G/H tasks in stance. Patient then returned to chair level SBA; better descent into chair and following cues for technique. Patient instructed in and completed BUE AROM therex, 1 x 10 reps: bicep curls, sh flexion, horizontal sh abd/add, punches.  Instructed in PLB technique and able to return demo /c cues.     Patient left up in chair with all lines intact, call button in reach and son presentEducation:      GOALS:   Multidisciplinary Problems     Occupational Therapy Goals        Problem: Occupational Therapy    Goal Priority Disciplines Outcome Interventions   Occupational Therapy Goal     OT, PT/OT Ongoing, Progressing    Description: Goals to be met by: 7/22     Patient will increase functional independence with ADLs by performing:    UE Dressing with Modified [place].  LE Dressing with Modified [place].  Grooming while standing at sink with Modified [place].  Toileting from toilet with Modified [place] for hygiene and clothing management.   Toilet transfer to toilet with Modified [place].  Upper extremity exercise program x10 reps per handout, with independence.  Pt will demo good understanding of energy conservation/work simplification                     Time Tracking:     OT Date of Treatment: 06/29/22  OT Start Time: 1108  OT Stop Time: 1135  OT Total Time (min): 27 min    Billable Minutes:Self Care/Home Management 15  Therapeutic Exercise 12    OT/CRISS: CRISS KAHN Visit Number: 3    6/29/2022

## 2022-06-29 NOTE — PLAN OF CARE
POC reviewed with patient, respirations are even and unlabored on 3L O2 NC. Lung sounds are diminished in anterior and lateral fields. Infrequent nonproductive, congested cough noted. Blood glucose checked AC/HS and at 2000 was 172. Levemir 8 units administer to Rt lower abdomen. All medications administered and patient was educated on use and side effects.    Problem: Adult Inpatient Plan of Care  Goal: Plan of Care Review  Outcome: Ongoing, Progressing     Problem: Diabetes Comorbidity  Goal: Blood Glucose Level Within Targeted Range  Outcome: Ongoing, Progressing

## 2022-06-29 NOTE — PROGRESS NOTES
IP Liaison - Initial Visit Note    Patient: Myesha Quinones  MRN:  [id number]  Date of Service:  6/29/2022  Completed by:  ORESTES Pederson    Reason for Visit   Patient presents with    IP Liaison Initial Visit       RSW contacted pt son via room phone in order to complete SDOH questionnaire and liaison assessment.  Pt son has identified no social barriers to care. Per pt son, pt receives meals through meals-on-wheels and has family support. Per pt son, pt is not in need of any additional resources. Due to pt eligibility, RSW referred pt to [facility] ([facility]).    The following were addressed during this visit:  - Review SDOH Questions   - Complete patient assessment   - Complete initial visit with patient   - Refer patient to [facility] Case [facility] ([facility])       Patient Summary     IP Liaison Patient Assessment    General  Level of Caregiver support: Member independent and does not need caregiver assistance  Have you had to make a decision between paying for any of the following in the last 2 months?: None  Transportation means: Family  Employment status: Retired and not working  Assessments  Was the PHQ Depression Screening completed this visit?: No  Was the JUSTA-7 Screening completed this visit?: No         ORESTES Pederson

## 2022-06-29 NOTE — PLAN OF CARE
Received pt on documented 02; no distress noted. Weaned 02 at this time. Tolerated tx; will cont to monitor

## 2022-06-30 LAB
ANION GAP SERPL CALC-SCNC: 15 MMOL/L (ref 8–16)
BASOPHILS # BLD AUTO: 0.03 K/UL (ref 0–0.2)
BASOPHILS NFR BLD: 0.3 % (ref 0–1.9)
BUN SERPL-MCNC: 59 MG/DL (ref 8–23)
CALCIUM SERPL-MCNC: 8.6 MG/DL (ref 8.7–10.5)
CHLORIDE SERPL-SCNC: 88 MMOL/L (ref 95–110)
CO2 SERPL-SCNC: 30 MMOL/L (ref 23–29)
CREAT SERPL-MCNC: 1.9 MG/DL (ref 0.5–1.4)
D DIMER PPP IA.FEU-MCNC: 2.76 MG/L FEU
DIFFERENTIAL METHOD: ABNORMAL
EOSINOPHIL # BLD AUTO: 0.1 K/UL (ref 0–0.5)
EOSINOPHIL NFR BLD: 0.6 % (ref 0–8)
ERYTHROCYTE [DISTWIDTH] IN BLOOD BY AUTOMATED COUNT: 14.1 % (ref 11.5–14.5)
EST. GFR  (AFRICAN AMERICAN): 28 ML/MIN/1.73 M^2
EST. GFR  (NON AFRICAN AMERICAN): 24 ML/MIN/1.73 M^2
FERRITIN SERPL-MCNC: 187 NG/ML (ref 20–300)
GLUCOSE SERPL-MCNC: 339 MG/DL (ref 70–110)
HCT VFR BLD AUTO: 27.2 % (ref 37–48.5)
HGB BLD-MCNC: 8.6 G/DL (ref 12–16)
IMM GRANULOCYTES # BLD AUTO: 0.08 K/UL (ref 0–0.04)
IMM GRANULOCYTES NFR BLD AUTO: 0.8 % (ref 0–0.5)
LDH SERPL L TO P-CCNC: 227 U/L (ref 110–260)
LYMPHOCYTES # BLD AUTO: 1 K/UL (ref 1–4.8)
LYMPHOCYTES NFR BLD: 9.5 % (ref 18–48)
MAGNESIUM SERPL-MCNC: 1.8 MG/DL (ref 1.6–2.6)
MCH RBC QN AUTO: 28.2 PG (ref 27–31)
MCHC RBC AUTO-ENTMCNC: 31.6 G/DL (ref 32–36)
MCV RBC AUTO: 89 FL (ref 82–98)
MONOCYTES # BLD AUTO: 0.7 K/UL (ref 0.3–1)
MONOCYTES NFR BLD: 6.9 % (ref 4–15)
NEUTROPHILS # BLD AUTO: 8.4 K/UL (ref 1.8–7.7)
NEUTROPHILS NFR BLD: 81.9 % (ref 38–73)
NRBC BLD-RTO: 0 /100 WBC
PHOSPHATE SERPL-MCNC: 3.1 MG/DL (ref 2.7–4.5)
PLATELET # BLD AUTO: 257 K/UL (ref 150–450)
PMV BLD AUTO: 10.1 FL (ref 9.2–12.9)
POCT GLUCOSE: 196 MG/DL (ref 70–110)
POCT GLUCOSE: 222 MG/DL (ref 70–110)
POCT GLUCOSE: 240 MG/DL (ref 70–110)
POCT GLUCOSE: 297 MG/DL (ref 70–110)
POTASSIUM SERPL-SCNC: 3.8 MMOL/L (ref 3.5–5.1)
RBC # BLD AUTO: 3.05 M/UL (ref 4–5.4)
SODIUM SERPL-SCNC: 133 MMOL/L (ref 136–145)
WBC # BLD AUTO: 10.29 K/UL (ref 3.9–12.7)

## 2022-06-30 PROCEDURE — 27000221 HC OXYGEN, UP TO 24 HOURS

## 2022-06-30 PROCEDURE — 82728 ASSAY OF FERRITIN: CPT

## 2022-06-30 PROCEDURE — 94761 N-INVAS EAR/PLS OXIMETRY MLT: CPT

## 2022-06-30 PROCEDURE — 36415 COLL VENOUS BLD VENIPUNCTURE: CPT | Performed by: HOSPITALIST

## 2022-06-30 PROCEDURE — 80048 BASIC METABOLIC PNL TOTAL CA: CPT | Performed by: NURSE PRACTITIONER

## 2022-06-30 PROCEDURE — 99900035 HC TECH TIME PER 15 MIN (STAT)

## 2022-06-30 PROCEDURE — 36415 COLL VENOUS BLD VENIPUNCTURE: CPT

## 2022-06-30 PROCEDURE — 25000003 PHARM REV CODE 250: Performed by: NURSE PRACTITIONER

## 2022-06-30 PROCEDURE — 85379 FIBRIN DEGRADATION QUANT: CPT

## 2022-06-30 PROCEDURE — 94799 UNLISTED PULMONARY SVC/PX: CPT

## 2022-06-30 PROCEDURE — 97110 THERAPEUTIC EXERCISES: CPT | Mod: CQ

## 2022-06-30 PROCEDURE — 85025 COMPLETE CBC W/AUTO DIFF WBC: CPT | Performed by: NURSE PRACTITIONER

## 2022-06-30 PROCEDURE — 84100 ASSAY OF PHOSPHORUS: CPT | Performed by: HOSPITALIST

## 2022-06-30 PROCEDURE — 94660 CPAP INITIATION&MGMT: CPT

## 2022-06-30 PROCEDURE — 83735 ASSAY OF MAGNESIUM: CPT | Performed by: NURSE PRACTITIONER

## 2022-06-30 PROCEDURE — 97116 GAIT TRAINING THERAPY: CPT | Mod: CQ

## 2022-06-30 PROCEDURE — 27000207 HC ISOLATION

## 2022-06-30 PROCEDURE — 83615 LACTATE (LD) (LDH) ENZYME: CPT

## 2022-06-30 PROCEDURE — 36415 COLL VENOUS BLD VENIPUNCTURE: CPT | Performed by: NURSE PRACTITIONER

## 2022-06-30 PROCEDURE — 94640 AIRWAY INHALATION TREATMENT: CPT

## 2022-06-30 PROCEDURE — 25000003 PHARM REV CODE 250: Performed by: INTERNAL MEDICINE

## 2022-06-30 PROCEDURE — 11000001 HC ACUTE MED/SURG PRIVATE ROOM

## 2022-06-30 PROCEDURE — 25000003 PHARM REV CODE 250

## 2022-06-30 PROCEDURE — 63600175 PHARM REV CODE 636 W HCPCS

## 2022-06-30 RX ADMIN — CARVEDILOL 25 MG: 25 TABLET, FILM COATED ORAL at 08:06

## 2022-06-30 RX ADMIN — ALBUTEROL SULFATE 2 PUFF: 90 AEROSOL, METERED RESPIRATORY (INHALATION) at 04:06

## 2022-06-30 RX ADMIN — THERA TABS 1 TABLET: TAB at 08:06

## 2022-06-30 RX ADMIN — ALUMINUM HYDROXIDE, MAGNESIUM HYDROXIDE, AND SIMETHICONE 15 ML: 200; 200; 20 SUSPENSION ORAL at 12:06

## 2022-06-30 RX ADMIN — OXYCODONE HYDROCHLORIDE AND ACETAMINOPHEN 500 MG: 500 TABLET ORAL at 08:06

## 2022-06-30 RX ADMIN — PRAVASTATIN SODIUM 40 MG: 40 TABLET ORAL at 08:06

## 2022-06-30 RX ADMIN — POLYETHYLENE GLYCOL 3350 17 G: 17 POWDER, FOR SOLUTION ORAL at 08:06

## 2022-06-30 RX ADMIN — HYDRALAZINE HYDROCHLORIDE 100 MG: 25 TABLET, FILM COATED ORAL at 09:06

## 2022-06-30 RX ADMIN — INSULIN ASPART 2 UNITS: 100 INJECTION, SOLUTION INTRAVENOUS; SUBCUTANEOUS at 07:06

## 2022-06-30 RX ADMIN — BENZONATATE 100 MG: 100 CAPSULE ORAL at 08:06

## 2022-06-30 RX ADMIN — MONTELUKAST 10 MG: 10 TABLET, FILM COATED ORAL at 09:06

## 2022-06-30 RX ADMIN — ENOXAPARIN SODIUM 60 MG: 100 INJECTION SUBCUTANEOUS at 05:06

## 2022-06-30 RX ADMIN — FUROSEMIDE 40 MG: 40 TABLET ORAL at 08:06

## 2022-06-30 RX ADMIN — INSULIN ASPART 2 UNITS: 100 INJECTION, SOLUTION INTRAVENOUS; SUBCUTANEOUS at 11:06

## 2022-06-30 RX ADMIN — GUAIFENESIN AND DEXTROMETHORPHAN HYDROBROMIDE 1 TABLET: 600; 30 TABLET, EXTENDED RELEASE ORAL at 09:06

## 2022-06-30 RX ADMIN — INSULIN ASPART 3 UNITS: 100 INJECTION, SOLUTION INTRAVENOUS; SUBCUTANEOUS at 11:06

## 2022-06-30 RX ADMIN — INSULIN ASPART 2 UNITS: 100 INJECTION, SOLUTION INTRAVENOUS; SUBCUTANEOUS at 04:06

## 2022-06-30 RX ADMIN — FLUTICASONE PROPIONATE 100 MCG: 50 SPRAY, METERED NASAL at 08:06

## 2022-06-30 RX ADMIN — INSULIN ASPART 1 UNITS: 100 INJECTION, SOLUTION INTRAVENOUS; SUBCUTANEOUS at 09:06

## 2022-06-30 RX ADMIN — HYDRALAZINE HYDROCHLORIDE 100 MG: 25 TABLET, FILM COATED ORAL at 08:06

## 2022-06-30 RX ADMIN — SODIUM BICARBONATE 1300 MG: 650 TABLET ORAL at 09:06

## 2022-06-30 RX ADMIN — FUROSEMIDE 40 MG: 40 TABLET ORAL at 05:06

## 2022-06-30 RX ADMIN — ALBUTEROL SULFATE 2 PUFF: 90 AEROSOL, METERED RESPIRATORY (INHALATION) at 07:06

## 2022-06-30 RX ADMIN — GUAIFENESIN 200 MG: 200 SOLUTION ORAL at 09:06

## 2022-06-30 RX ADMIN — CARVEDILOL 25 MG: 25 TABLET, FILM COATED ORAL at 09:06

## 2022-06-30 RX ADMIN — SODIUM BICARBONATE 1300 MG: 650 TABLET ORAL at 02:06

## 2022-06-30 RX ADMIN — OXYCODONE HYDROCHLORIDE AND ACETAMINOPHEN 500 MG: 500 TABLET ORAL at 09:06

## 2022-06-30 RX ADMIN — GUAIFENESIN AND DEXTROMETHORPHAN HYDROBROMIDE 1 TABLET: 600; 30 TABLET, EXTENDED RELEASE ORAL at 08:06

## 2022-06-30 RX ADMIN — FLUTICASONE FUROATE AND VILANTEROL TRIFENATATE 1 PUFF: 100; 25 POWDER RESPIRATORY (INHALATION) at 07:06

## 2022-06-30 RX ADMIN — SODIUM BICARBONATE 1300 MG: 650 TABLET ORAL at 08:06

## 2022-06-30 RX ADMIN — ALBUTEROL SULFATE 2 PUFF: 90 AEROSOL, METERED RESPIRATORY (INHALATION) at 12:06

## 2022-06-30 NOTE — PLAN OF CARE
Recommendation:   1. Continue current diabetic diet as tolerated with Boost Glucose Control TID.   2. Encourage intake of meals and ONS.   3. Consider appetite stimulant.  4. Monitor weight/labs.   5. RD to follow and monitor intake    Goals:   Pt intake >/= 50% EEN/EPN by RD follow up    Nutrition Goal Status: progressing towards goal  Communication of RD Recs: other (comment) (POC)      Problem: Oral Intake Inadequate (Acute Kidney Injury/Impairment)  Goal: Optimal Nutrition Intake  Outcome: Ongoing, Progressing

## 2022-06-30 NOTE — PT/OT/SLP PROGRESS
Physical Therapy Treatment    Patient Name:  Myesha Quinones   MRN:  [id number]    Recommendations:     Discharge Recommendations:  other (see comments) (post acute placement)   Discharge Equipment Recommendations: none   Barriers to discharge: None    Assessment:     Myesha Quinones is a 82 y.o. female admitted with a medical diagnosis of Acute respiratory failure with hypoxia.  She presents with the following impairments/functional limitations:  weakness, impaired endurance, impaired functional mobilty, gait instability, impaired balance, decreased lower extremity function . Pt is progressing well towards goals.     Rehab Prognosis: Good; patient would benefit from acute skilled PT services to address these deficits and reach maximum level of function.    Recent Surgery: * No surgery found *      Plan:     During this hospitalization, patient to be seen 3 x/week to address the identified rehab impairments via gait training, therapeutic activities, therapeutic exercises, neuromuscular re-education and progress toward the following goals:    · Plan of Care Expires:  07/28/22    Subjective     Chief Complaint: none  Patient/Family Comments/goals: pt agreed to therapy  Pain/Comfort:  · Pain Rating 1: 0/10  · Pain Rating Post-Intervention 1: 0/10      Objective:     Communicated with nurse Grossman  prior to session.  Patient found up in chair with telemetry, peripheral IV, lauren catheter upon PT entry to room.     General Precautions: Standard, fall, airborne, contact, droplet   Orthopedic Precautions:N/A   Braces:    Respiratory Status: Room air     Functional Mobility:  · Transfers:     · Sit to Stand:  supervision with rolling walker  · Gait: 100 ft with rw with S in room with decreased sandra  · Balance: F+ standing       AM-PAC 6 CLICK MOBILITY  Turning over in bed (including adjusting bedclothes, sheets and blankets)?: 4  Sitting down on and standing up from a chair with arms (e.g., wheelchair, bedside  commode, etc.): 4  Moving from lying on back to sitting on the side of the bed?: 4  Moving to and from a bed to a chair (including a wheelchair)?: 4  Need to walk in hospital room?: 4  Climbing 3-5 steps with a railing?: 4  Basic Mobility Total Score: 24       Therapeutic Activities and Exercises:   BLE TE seated 2x10; LAQ, MARCHES, HS, HR,  TT, ABD/ADD (PILLOW SQUEEZES)    Patient left up in chair with all lines intact, call button in reach and daughter present..    GOALS:   Multidisciplinary Problems     Physical Therapy Goals        Problem: Physical Therapy    Goal Priority Disciplines Outcome Goal Variances Interventions   Physical Therapy Goal     PT, PT/OT Ongoing, Progressing     Description: Goals to be met by: Discharge     Patient will increase functional independence with mobility by performin. Supine to sit with [place]  2. Sit to supine with [place]  3. Sit to stand transfer with Modified [place]  4. Gait  x 150 feet with Modified [place] using Rolling Walker.                      Time Tracking:     PT Received On: 22  PT Start Time: 1245     PT Stop Time: 1310  PT Total Time (min): 25 min     Billable Minutes: Gait Training 8 and Therapeutic Exercise 17    Treatment Type: Treatment  PT/PTA: PTA     PTA Visit Number: 1     2022

## 2022-06-30 NOTE — PLAN OF CARE
Pt has been accepted by [facility] and [facility] SNF's.  I spoke with pt's daughter Betsy.  Betsy does not prefer [facility].  She is driving to [facility] to view facility now.  She will call me back once she has viewed facility.      Betsy Bowen Daughter [phone]          06/30/22 1238   Post-Acute Status   Post-Acute Authorization Placement     Apolinar Padron, RN   Supervisor Case Management-[facility]  [phone]

## 2022-06-30 NOTE — ASSESSMENT & PLAN NOTE
Patient presents with shortness of breath and PELAYO. Bibasilar rales and peripheral edema on exam; L >R edema  - consistent clinical presentation;   -Troponin 0.015; likely demand; will continue to monitor and trend  -CXR revealed diffuse pulmonary edema with right small pleural effusion  - initiated lasix 40mg IV  in ED, continue at 40mg IV q12hr. Switch to po lasix   - continue home BB, will hold ARB in setting of VIRGILIO  - Daily weights  -Strict I/Os  - Monitor on telemetry  - Monitor and trend BMP, Mg, and renal function; keep K >4, Mg >2  -Sodium restriction (<2g/d), fluid restriction (<2L)   - 2D echo to assess cardiac function and valvular dysfunction.     Results for orders placed during the hospital encounter of 06/20/22    Echo    Interpretation Summary  · The left ventricle is normal in size with concentric hypertrophy and normal systolic function.  · The estimated ejection fraction is 55%.  · Grade II left ventricular diastolic dysfunction.  · Normal right ventricular size with normal right ventricular systolic function.  · Severe left atrial enlargement.  · Mild mitral regurgitation.  · Mild to moderate tricuspid regurgitation.  · Intermediate central venous pressure (8 mmHg).  · The estimated PA systolic pressure is 56 mmHg.  · There is pulmonary hypertension.  · There is a left pleural effusion.      Intake/Output Summary (Last 24 hours) at 6/30/2022 0950  Last data filed at 6/30/2022 0434  Gross per 24 hour   Intake 480 ml   Output 2250 ml   Net -1770 ml       - Plan to switch to po lasix if CXR improving

## 2022-06-30 NOTE — PLAN OF CARE
POC reviewed with patient, Aox4, respirations are even and unlabored with diminished, coarse breath sounds in the anterior and lateral fields.  Patient c/o frequent coughing and not breathing well. RT evaluated patient and found that she her O2 saturations was 93%. IS given with instructions on proper use. Lt arm PIV flushed and saline locked. De La Cruz catheter remain in place and draining clear yellow urine. Patient turns self independently.    Problem: Adult Inpatient Plan of Care  Goal: Plan of Care Review  Outcome: Ongoing, Progressing

## 2022-06-30 NOTE — PLAN OF CARE
Problem: Physical Therapy  Goal: Physical Therapy Goal  Description: Goals to be met by: Discharge     Patient will increase functional independence with mobility by performin. Supine to sit with [place]  2. Sit to supine with [place]  3. Sit to stand transfer with Modified [place]  4. Gait  x 150 feet with Modified [place] using Rolling Walker.     Outcome: Ongoing, Progressing   Pt in chair on RA SPO2 96%.  Sit to stand with rw with S. AMB with rw x 100 ft with RW with S.  PT CARLOS ble te X 10 with demo/vcs.  Pt demo'd understanding.

## 2022-06-30 NOTE — PLAN OF CARE
I spoke with pt's daughter Betsy.  She does not prefer [facility].  Her preference is [facility].  Pt has been accepted by [facility].      Betsy Bowen Daughter [phone]

## 2022-06-30 NOTE — PLAN OF CARE
Problem: Adult Inpatient Plan of Care  Goal: Plan of Care Review  Outcome: Ongoing, Progressing   Chart reviewed.

## 2022-06-30 NOTE — PLAN OF CARE
Pt has been accepted by [facility], [facility] and [facility]'s.

## 2022-06-30 NOTE — ASSESSMENT & PLAN NOTE
Estimated Creatinine Clearance: 18.6 mL/min (A) (based on SCr of 1.9 mg/dL (H)).  Baseline Cr: 2.1Baseline BUN:46   Recent Labs   Lab 06/26/22  0502 06/26/22  0755 06/27/22  0356 06/27/22  0910 06/28/22  0401 06/28/22  0830 06/29/22  0746   CO2 29  --  31*  --  31*  --   --    BUN 73*  --  62*  --  61*  --   --    CREATININE 2.1*  --  2.0*  --  1.9*  --   --    PHOS  --    < >  --  4.2  --  4.0 3.8    < > = values in this interval not displayed.       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 Will hold diuresis with IV Lasix  -Monitor strict urine output  - Continue to monitor renal function with daily labs and trend electrolytes  - renally dose medications  - avoid nephrotoxic agents including NSAIDs, aminoglycosides, IV contrast (unless absolutely necessary), gadolinium, fleets and other phosphorous-based laxatives  - monitor events that may lead to decreased renal perfusion (hypovolemia, hypotension, sepsis)  - monitor urine output to assure that no obstruction precipitates worsening in GFR    - improving

## 2022-06-30 NOTE — SUBJECTIVE & OBJECTIVE
Interval History: Seen at the bedside. Family concerned that the patient maybe too weak to return home. Asking for skilled placement. CXR ordered for increased SOB--atelectasis noted. Will cont current treatment. PT/OT following.     Review of Systems   Constitutional:  Negative for fatigue and fever.   HENT:  Negative for trouble swallowing.    Eyes:  Negative for visual disturbance.   Respiratory:  Negative for cough and shortness of breath.    Gastrointestinal:  Negative for diarrhea, nausea and vomiting.   Musculoskeletal:  Negative for gait problem.   Skin:  Negative for rash.   Neurological:  Negative for weakness.   Psychiatric/Behavioral:  Negative for confusion.    Objective:     Vital Signs (Most Recent):  Temp: 97 °F (36.1 °C) (06/29/22 1615)  Pulse: 91 (06/29/22 1857)  Resp: 18 (06/29/22 1615)  BP: (!) 178/77 (06/29/22 1857)  SpO2: (!) 92 % (06/29/22 1615)   Vital Signs (24h Range):  Temp:  [97 °F (36.1 °C)-97.5 °F (36.4 °C)] 97 °F (36.1 °C)  Pulse:  [72-91] 91  Resp:  [16-18] 18  SpO2:  [92 %-98 %] 92 %  BP: (143-185)/(66-81) 178/77     Weight: 61.2 kg (134 lb 14.7 oz)  Body mass index is 23.9 kg/m².    Intake/Output Summary (Last 24 hours) at 6/29/2022 2017  Last data filed at 6/29/2022 1800  Gross per 24 hour   Intake 480 ml   Output 2600 ml   Net -2120 ml      Physical Exam  Vitals and nursing note reviewed.   HENT:      Head: Normocephalic and atraumatic.      Nose: Congestion present.      Mouth/Throat:      Mouth: Mucous membranes are moist.   Eyes:      Extraocular Movements: Extraocular movements intact.      Conjunctiva/sclera: Conjunctivae normal.   Cardiovascular:      Rate and Rhythm: Normal rate.      Pulses: Normal pulses.   Pulmonary:      Effort: Pulmonary effort is normal. No respiratory distress.   Abdominal:      General: There is no distension.      Tenderness: There is no abdominal tenderness. There is no guarding.   Musculoskeletal:         General: Normal range of motion.       Cervical back: Normal range of motion.   Skin:     General: Skin is warm and dry.      Capillary Refill: Capillary refill takes 2 to 3 seconds.   Neurological:      Mental Status: She is alert and oriented to person, place, and time.      Motor: Weakness present.       Significant Labs: All pertinent labs within the past 24 hours have been reviewed.    Significant Imaging: I have reviewed all pertinent imaging results/findings within the past 24 hours.

## 2022-06-30 NOTE — SUBJECTIVE & OBJECTIVE
Interval History: Seen at the bedside. PT/OT following. Family requesting skilled nursing facility---working with CM.    Review of Systems   Constitutional:  Negative for fatigue and fever.   HENT:  Negative for trouble swallowing.    Eyes:  Negative for visual disturbance.   Respiratory:  Negative for cough and shortness of breath.    Gastrointestinal:  Negative for diarrhea, nausea and vomiting.   Musculoskeletal:  Negative for gait problem.   Skin:  Negative for rash.   Neurological:  Negative for weakness.   Psychiatric/Behavioral:  Negative for confusion.    Objective:     Vital Signs (Most Recent):  Temp: 98.6 °F (37 °C) (06/30/22 1113)  Pulse: 82 (06/30/22 1300)  Resp: 20 (06/30/22 1251)  BP: (!) 162/70 (06/30/22 1113)  SpO2: 97 % (06/30/22 1251)   Vital Signs (24h Range):  Temp:  [97 °F (36.1 °C)-98.8 °F (37.1 °C)] 98.6 °F (37 °C)  Pulse:  [74-99] 82  Resp:  [18-24] 20  SpO2:  [92 %-97 %] 97 %  BP: (156-188)/(68-77) 162/70     Weight: 62.8 kg (138 lb 7.2 oz)  Body mass index is 24.53 kg/m².    Intake/Output Summary (Last 24 hours) at 6/30/2022 1449  Last data filed at 6/30/2022 0434  Gross per 24 hour   Intake 120 ml   Output 1650 ml   Net -1530 ml        Physical Exam  Vitals and nursing note reviewed.   HENT:      Head: Normocephalic and atraumatic.      Nose: Congestion present.      Mouth/Throat:      Mouth: Mucous membranes are moist.   Eyes:      Extraocular Movements: Extraocular movements intact.      Conjunctiva/sclera: Conjunctivae normal.   Cardiovascular:      Rate and Rhythm: Normal rate.      Pulses: Normal pulses.   Pulmonary:      Effort: Pulmonary effort is normal. No respiratory distress.   Abdominal:      General: There is no distension.      Tenderness: There is no abdominal tenderness. There is no guarding.   Musculoskeletal:         General: Normal range of motion.      Cervical back: Normal range of motion.   Skin:     General: Skin is warm and dry.      Capillary Refill: Capillary  refill takes 2 to 3 seconds.   Neurological:      Mental Status: She is alert and oriented to person, place, and time.      Motor: Weakness present.       Significant Labs: All pertinent labs within the past 24 hours have been reviewed.    Significant Imaging: I have reviewed all pertinent imaging results/findings within the past 24 hours.

## 2022-06-30 NOTE — NURSING
Nurse was called to room by patient because she wanted to be put back oxygen per NC and taken of CPAP after only about 1 hour total. Patient was placed back on 1 L O2 NC. Will continue to monitor

## 2022-06-30 NOTE — PLAN OF CARE
PARTH spoke with Ifrah with [place] management. She will have pt approved for [facility]. Ifrah stated pt will not need new covid test. PARTH will follow.      PARTH attempted to call [facility] [place] Phone: [phone]  parth was on hold and was unable to leave .    Berto Sierra, MSW  [phone]    Future Appointments   Date Time Provider Department Center   7/7/2022 11:00 AM CARRIE Arechiga MD [facility] OPHTHAL [facility]   7/8/2022  1:00 PM Ev Jiménez MD [facility] IMPRI [facility]   7/11/2022  2:00 PM Leonides Fuentes MD [facility]   7/12/2022 10:00 AM [facility] CT1 LIMIT 450 LBS [facility] CT SCAN [facility]   7/26/2022 11:00 AM Catrachita Rothman MD [facility] IM [facility] PCW   8/6/2022 10:00 AM HOME MONITOR DEVICE CHECK, [facility] ARRHPRO [facility]   8/23/2022  9:45 AM Mansi Borrero MD [facility]   8/25/2022 11:30 AM Diaz Roche MD [facility] DERMSUR [facility]   9/1/2022  9:00 AM Eric Rivera PA-C [facility] UROGYN [facility]   9/6/2022  1:30 PM Seng Salgado MD [facility] CARDIO [place]   9/26/2022  8:15 AM LAB, [facility] LAB [place]   10/3/2022  9:00 AM Elidia Madison NP [facility] ENDOC [place]        06/30/22 1231   Post-Acute Status   Post-Acute Authorization Placement   Discharge Plan   Discharge Plan A Skilled Nursing Facility

## 2022-06-30 NOTE — PROGRESS NOTES
[facility] - Telemetry  Adult Nutrition  Progress Note    SUMMARY       Recommendations    Recommendation:   1. Continue current diabetic diet as tolerated with Boost Glucose Control TID.   2. Encourage intake of meals and ONS.   3. Consider appetite stimulant.  4. Monitor weight/labs.   5. RD to follow and monitor intake    Goals:   Pt intake >/= 50% EEN/EPN by RD follow up    Nutrition Goal Status: progressing towards goal  Communication of RD Recs: other (comment) (POC)    Assessment and Plan    * Acute respiratory failure with hypoxia  Contributing Nutrition Diagnosis  Inadequate energy intake    Related to (etiology):   physiological state    Signs and Symptoms (as evidenced by):   Pt admitted with acute respiratory failure with hypoxia, COVID-19 virus infection, COPD with acute exacerbation, UTI, CHF, and VIRGILIO. Minimal intake of meals.       Interventions:  Collaboration with other providers  [employer]- Boost Glucose Control TID    Nutrition Diagnosis Status:   Continues           Malnutrition Assessment  NFPE not performed, patient has been screened for possible COVID-19 and has been placed on airborne and contact precautions. Patient is noted as being positive for COVID-19.    Reason for Assessment    Reason For Assessment: RD follow-up  Diagnosis: other (see comments) (Acute respiratory failure with hypoxia)  Relevant Medical History: HTN, HLD, vitamin D and vitamin B13 deficiency, CHF, CAD, ischemic cariomyopathy, meningioma, breast cacer, left kidney mass, DMT2, goiter, osteoporosis, COPD, BCC with removals, pulmonary fibrosis, anemia, CKD stage 4, cardiac defibrillator placement, cholecystectomy, R mastectimy, replacement of ICD  Interdisciplinary Rounds: did not attend  General Information Comments: Pt receiving diabetic diet with Boost Glucose Control TID. 50% intake re orded. PEr chart pt with congested cough, SOB and fatigue with exerction. VIRGILIO on CKD. PIV. Satya Score: 19 NFPE not completed  "2/2 COVID isolation  Nutrition Discharge Planning: d/c on diabetic diet with Boost Glucose Control or similar BID    Nutrition Risk Screen    Nutrition Risk Screen: no indicators present    Nutrition/Diet History    Food Preferences: MARIO  Spiritual, Cultural Beliefs, [employer]al Practices, Values that Affect Care: no  Food Allergies: NKFA  Factors Affecting Nutritional Intake: decreased appetite    Anthropometrics    Temp: 97 °F (36.1 °C)  Height Method: Stated  Height: 5' 3" (160 cm)  Height (inches): 63 in  Weight Method: Bed Scale  Weight: 61.2 kg (134 lb 14.7 oz)  Weight (lb): 134.92 lb  Ideal Body Weight (IBW), Female: 115 lb  % Ideal Body Weight, Female (lb): 114.07 %  BMI (Calculated): 23.9  BMI Grade: 18.5-24.9 - normal       Lab/Procedures/Meds    Pertinent Labs Reviewed: reviewed  Pertinent Labs Comments: 6/28 labs- Cl 91, CO2 31, BUN 61, Cr 1.9. eGFR 24, Glu 55, Ca 8.6  Pertinent Medications Reviewed: reviewed  Pertinent Medications Comments: ascorbic acid, carvedilol, dextromethorphan-guaifenesisn, enoxaparin, furosemide, hydralazine, insulin aspart, insulin detemir, mentelukast, MVI, poly glycol, statin, Na bicarb, tiotropium    Estimated/Assessed Needs    Weight Used For Calorie Calculations: 59.5 kg (131 lb 2.8 oz)  Energy Calorie Requirements (kcal): 1488  Energy Need Method: Kcal/kg (25 kcal/kg)  Protein Requirements: 60-72 (1.0-1.2 gm/kg)  Weight Used For Protein Calculations: 59.5 kg (131 lb 2.8 oz)     Estimated Fluid Requirement Method: RDA Method (or PER MD)  RDA Method (mL): 1488  CHO Requirement: 170 gm/day      Nutrition Prescription Ordered    Current Diet Order: Diabetic  Oral Nutrition Supplement: Boost Glucose Control TID    Evaluation of Received Nutrient/Fluid Intake    I/O: 940/3550  Energy Calories Required: not meeting needs  Protein Required: not meeting needs  Fluid Required: not meeting needs  Comments: LBM 6/28  % Intake of Estimated Energy Needs: 50 - 75 %  % Meal Intake: 50 - 75 " %    Nutrition Risk    Level of Risk/Frequency of Follow-up:  (2x/week)     Monitor and Evaluation    Food and Nutrient Intake: energy intake, food and beverage intake  Food and Nutrient Adminstration: diet order  Knowledge/Beliefs/Attitudes: food and nutrition knowledge/skill  Physical Activity and Function: nutrition-related ADLs and IADLs  Anthropometric Measurements: weight, weight change, body mass index  Biochemical Data, Medical Tests and Procedures: electrolyte and renal panel, gastrointestinal profile, glucose/endocrine profile, inflammatory profile, lipid profile     Nutrition Follow-Up    RD Follow-up?: Yes

## 2022-06-30 NOTE — ASSESSMENT & PLAN NOTE
-Progressive dyspnea over the past two weeks  -may be in the setting of acute COPD exacerbation, ADHF, and COVID-19 infection  -Procalcitonin negative  -COVID-19 protocol initiated - On Remdesivir  -D.Dimer 2.38; may be multifactorial as stated above  -L >R LLE edema; bilateral LLE US - No DVTnoted  -Initiate Lovenox 1mg/Kg daily in setting of VIRGILIO  - Breathing treatment  - IV lasix - hold due to HHS  - Supplemental oxygen titrate as needed  - Continuous pulse ox      Patient with Hypoxic Respiratory failure which is Acute.  she is not on home oxygen. Supplemental ventilation was provided and noted- Oxygen Concentration (%):  [32-40] 32.   Differential diagnosis includes - CHF, COPD, Pleural effusion and COVID-19 infection, MAC, chronic Aspergillosis Labs and images were reviewed. Patient Has not has a recent ABG. Will treat underlying causes and adjust management of respiratory failure as above    - weaning oxygen  - Volume overload - switch to po lasix  - Consult Pulmonary for pulmonary effusion

## 2022-06-30 NOTE — NURSING
Care plans reviewed. No c/o of SOB or chest pains, no acute distress noted. All medications given as ordered. Vitals WNL. Safety maintained, bed in lowest position, bed alarm on, bed wheels locked, call light with in reach. Will continue to monitor.

## 2022-06-30 NOTE — PROGRESS NOTES
"[facility] Medicine  Progress Note    Patient Name: Myesha Quinones  MRN: [id number]  Patient Class: IP- Inpatient   Admission Date: 6/20/2022  Length of Stay: 7 days  Attending Physician: Yrn Sheppard MD  Primary Care Provider: Catrachita Rothman MD        Subjective:     Principal Problem:Acute respiratory failure with hypoxia        HPI:  Myesha Quinones has a past medical history of CAD, combined systolic and diastolic heart failure, LBBB, biventricular ICD, CKD4, IDDM, breast cancer, HTN, HLD, severe persistent asthma, COPD, and HLD presents to [facility] ED with complaints of SOB. She states her SOB is associated with a productive cough. She states her SOB has been progressively worsened over the past two weeks. She reports she was seen by her pulmonologist and was prescribed antibiotic and prednisone which she started last Tuesday. She states she did not complete her antibiotics or steroid therapy. She denies fever, chest pain, palpitations, N/V, or diarrhea.     She was seen by her pulmonolgist, Dr. Maki on 6/13/22 for shortness of breath. Reported cough and congestion attributing to sinus infection. She was also noted to have a current MAC infection which she is not on therapy. She was started on 10 day course of Augmentin with 10mg PO prednisone to accompany.     Of Note: She was primarily followed by ID, Dr. Amaya, for ABPA (allergic bronchopulmonary aspergillosis) and was started on Voriconazole therapy in which she did not tolerate.       In the ED: BP (!) 165/107   Pulse 83   Temp 98 °F (36.7 °C) (Axillary)   Resp (!) 22   Ht 5' 3" (1.6 m)   Wt 59 kg (130 lb)   LMP  (LMP Unknown)   SpO2 (!) 94%   BMI 23.03 kg/m² Labs revealed WBC 13.35, H/H 9/27.8, BUN 64, creatinine 2.8, Glucose 161, . Troponin 0.015. 12 lead EKG revealed Normal sinus rhythm, Left axis deviation, Right bundle branch block. She is Covid positive. CXR revealed diffuse pulmonary edema with a right " small pleural effusion. She received Duo-neb tx x1, Lasix 40mg IV x1, and Prednisone 40mg PO x1. ID consulted to follow.         Overview/Hospital Course:  Patient presented to the hospital for SOB evaluation.  She was admitted to the hospital medicine for acute on chronic respiratory failure secondary to COVID-19 infection and CHF exacerbation. Patient was treated with COVID-19 protocol with remdesivir/steroid, supportive care with oxygen, and started on IV lasix. Patient did not tolerate steroid therapy and developed HHS which required short-time ICU stay. She was evaluated by ID and was placed on 5 day course of IV cefepime due to hx of MAC/ABPA and pseudomonas found on recent sputum culture. Pulmonology was consulted for bilateral pleural effusion. Bedside US revealed small bilateral effusion no pocket large enough for therapeutic thoracentesis. Pulmonology recommended continue diuresis therapy. 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 Per Urology, continuing lauren upon discharge and they would see her in clinic after 1-2 weeks for void trials. Patient was gradually clinically improved after finished antibiotic/antiviral courses and was able to switch to oral lasix. PT/OT recommended HH upon discharge      Interval History: Seen at the bedside. Family concerned that the patient maybe too weak to return home. Asking for skilled placement. CXR ordered for increased SOB--atelectasis noted. Will cont current treatment. PT/OT following.     Review of Systems   Constitutional:  Negative for fatigue and fever.   HENT:  Negative for trouble swallowing.    Eyes:  Negative for visual disturbance.   Respiratory:  Negative for cough and shortness of breath.    Gastrointestinal:  Negative for diarrhea, nausea and vomiting.   Musculoskeletal:  Negative for gait problem.   Skin:  Negative for rash.   Neurological:  Negative for weakness.   Psychiatric/Behavioral:   Negative for confusion.    Objective:     Vital Signs (Most Recent):  Temp: 97 °F (36.1 °C) (06/29/22 1615)  Pulse: 91 (06/29/22 1857)  Resp: 18 (06/29/22 1615)  BP: (!) 178/77 (06/29/22 1857)  SpO2: (!) 92 % (06/29/22 1615)   Vital Signs (24h Range):  Temp:  [97 °F (36.1 °C)-97.5 °F (36.4 °C)] 97 °F (36.1 °C)  Pulse:  [72-91] 91  Resp:  [16-18] 18  SpO2:  [92 %-98 %] 92 %  BP: (143-185)/(66-81) 178/77     Weight: 61.2 kg (134 lb 14.7 oz)  Body mass index is 23.9 kg/m².    Intake/Output Summary (Last 24 hours) at 6/29/2022 2017  Last data filed at 6/29/2022 1800  Gross per 24 hour   Intake 480 ml   Output 2600 ml   Net -2120 ml      Physical Exam  Vitals and nursing note reviewed.   HENT:      Head: Normocephalic and atraumatic.      Nose: Congestion present.      Mouth/Throat:      Mouth: Mucous membranes are moist.   Eyes:      Extraocular Movements: Extraocular movements intact.      Conjunctiva/sclera: Conjunctivae normal.   Cardiovascular:      Rate and Rhythm: Normal rate.      Pulses: Normal pulses.   Pulmonary:      Effort: Pulmonary effort is normal. No respiratory distress.   Abdominal:      General: There is no distension.      Tenderness: There is no abdominal tenderness. There is no guarding.   Musculoskeletal:         General: Normal range of motion.      Cervical back: Normal range of motion.   Skin:     General: Skin is warm and dry.      Capillary Refill: Capillary refill takes 2 to 3 seconds.   Neurological:      Mental Status: She is alert and oriented to person, place, and time.      Motor: Weakness present.       Significant Labs: All pertinent labs within the past 24 hours have been reviewed.    Significant Imaging: I have reviewed all pertinent imaging results/findings within the past 24 hours.      Assessment/Plan:      * Acute respiratory failure with hypoxia  -Progressive dyspnea over the past two weeks  -may be in the setting of acute COPD exacerbation, ADHF, and COVID-19  infection  -Procalcitonin negative  -COVID-19 protocol initiated - On Remdesivir  -D.Dimer 2.38; may be multifactorial as stated above  -L >R LLE edema; bilateral LLE US - No DVTnoted  -Initiate Lovenox 1mg/Kg daily in setting of VIRGILIO  - Breathing treatment  - IV lasix - hold due to HHS  - Supplemental oxygen titrate as needed  - Continuous pulse ox      Patient with Hypoxic Respiratory failure which is Acute.  she is not on home oxygen. Supplemental ventilation was provided and noted- Oxygen Concentration (%):  [40] 40.   Differential diagnosis includes - CHF, COPD, Pleural effusion and COVID-19 infection, MAC, chronic Aspergillosis Labs and images were reviewed. Patient Has not has a recent ABG. Will treat underlying causes and adjust management of respiratory failure as above    - weaning oxygen  - Volume overload - switch to po lasix  - Consult Pulmonary for pulmonary effusion    Hyperosmolar hyperglycemic state (HHS)  -Patient is with evidence of HHS given hyperglycemia, elevated anion gap metabolic acidosis, glucosuria,  -Etiology likely 2/2 high dose steroid use  - Received IVF bolus 1L over night  - Received regular insulin bolus and started on weight-based insulin infusion until the anion-gap returns to normal and blood glucose is stabilized.    -Hourly AccuCheks  -check BMPs every 4 hours. Will replace potassium as necessary.    -Will start basal-bolus insulin therapy thereafter as well as an oral diet.    -Resolved      UTI (urinary tract infection)  Presenting with urinary retention. UA in ED with leukocytes. Urine culture 6/1/22 prior admission grew Acinetobacter Baumannii  > 100,000 cfu/ml  - Off Cipro due to prolong QTc. Now on Cefepime for pneumonia  - F/u urine culture - No growth      Type 2 diabetes mellitus, with long-term current use of insulin  Hemoglobin A1C   Date Value Ref Range Status   02/22/2022 5.9 (H) 4.0 - 5.6 % Final     -Serum glucose on admit 161  - hold home oral medications  -  LDSSI with ACCUcheck ACHS  - Diabetic diet  -Hypoglycemia protocol PRN  -Monitor closely and adjust insulin regimen as clinically indicated to achieve levels of 140-180 during hospitalization        Essential hypertension  -Resume home BB and hydralazine; hold ARB in setting of VIRGILIO  -Monitor and trend vital signs q4hr   -Anti-hypertensives PRN for SBP >180      COVID-19 virus infection  - COVID positive 6/20/22; initiated on protocol  - Isolation: Airborne/Droplet. Surgical mask on patient. Notify Infection Control  - CXR with diffuse pulmonary edema with small right pleural effusion, requiring O2  - Monitor on Telemetry  -Received 40mg PO Prednisone x1 in ED   - initiated on remdesivir x5d - completed  - CRP pending; D-dimer 2.38  -Covid Labs pending  - Order RT consult via Respiratory Communication for COVID Protocols  - Incentive Spirometer Q4h to promote lung compliance   - Continuous Pulse Oximetry, goal SpO2 92-96%  - supplemental O2 PRN, will wean as tolerated  - Albuterol INH Q6h scheduled & PRN   - MVI & ascorbic acid 500mg PO BID  -Anti-tussives for cough  - Acetaminophen Q6hr PRN fever/headache  - Loperamide PRN viral diarrhea  - VTE PPx: enoxaparin 1mg/kg q24h in setting of VIRGILIO  - PT/OT for debility/weakness  - If deterioration, may warrant trial of NIPPV in neg pressure room or immediate ICU consult  - Not able to treat with dexamethasone course due to uncontrolled BG up to 700s/HHS  - Continue support care  - weaning oxygen        Acute on chronic combined systolic and diastolic CHF (congestive heart failure)  Patient presents with shortness of breath and PELAYO. Bibasilar rales and peripheral edema on exam; L >R edema  - consistent clinical presentation;   -Troponin 0.015; likely demand; will continue to monitor and trend  -CXR revealed diffuse pulmonary edema with right small pleural effusion  - initiated lasix 40mg IV  in ED, continue at 40mg IV q12hr. Switch to po lasix   - continue home BB,  "will hold ARB in setting of VIRGILIO  - Daily weights  -Strict I/Os  - Monitor on telemetry  - Monitor and trend BMP, Mg, and renal function; keep K >4, Mg >2  -Sodium restriction (<2g/d), fluid restriction (<2L)   - 2D echo to assess cardiac function and valvular dysfunction.     Results for orders placed during the hospital encounter of 06/20/22    Echo    Interpretation Summary  · The left ventricle is normal in size with concentric hypertrophy and normal systolic function.  · The estimated ejection fraction is 55%.  · Grade II left ventricular diastolic dysfunction.  · Normal right ventricular size with normal right ventricular systolic function.  · Severe left atrial enlargement.  · Mild mitral regurgitation.  · Mild to moderate tricuspid regurgitation.  · Intermediate central venous pressure (8 mmHg).  · The estimated PA systolic pressure is 56 mmHg.  · There is pulmonary hypertension.  · There is a left pleural effusion.      Intake/Output Summary (Last 24 hours) at 6/29/2022 2040  Last data filed at 6/29/2022 1800  Gross per 24 hour   Intake 480 ml   Output 2600 ml   Net -2120 ml       - Plan to switch to po lasix if CXR improving        Mycobacterium avium complex  See above      ABPA (allergic bronchopulmonary aspergillosis)  Mycobacterium avium complex    -Followed by ID, Dr. Amaya; was placed on Voriconazole therapy but unable to tolerate  -Presents to ED with worsening SOB not improved with recent trial of Augmentin and Prednisone  -Procalcitonin normal  -ID consulted to evaluate and assist in antibiotic administration. Per ID "Pt can be colonized with pseudomonas- had recurrent PsA in past in sputum- but can consider 5 day couse but not more than 1gm IV daily since Cr Cl was 13.9"  -Start Cefepime 1 g q24h for 5 days - Completed      Urinary retention  Hx with urinary retention due to prolapse required lauren in the past. Bladder scanned for 1240 on admission  - Lauren placed in ED  - Failed voiding trials " 6/26. Now back with De La Cruz  - Urology consulted who recommends keep De La Cruz, no further urologic intervention needed at current, and f/u outpatient 1-2 weeks for void trials      Hyperlipidemia associated with type 2 diabetes mellitus  Last LDL was   Lab Results   Component Value Date    LDLCALC 29.8 (L) 04/03/2022      Patient is chronically on statin.will continue for now.   Monitor clinically.          Acute kidney injury superimposed on chronic kidney disease  Estimated Creatinine Clearance: 18.6 mL/min (A) (based on SCr of 1.9 mg/dL (H)).  Baseline Cr: 2.1Baseline BUN:46   Recent Labs   Lab 06/26/22  0502 06/26/22  0755 06/27/22  0356 06/27/22  0910 06/28/22  0401 06/28/22  0830 06/29/22  0746   CO2 29  --  31*  --  31*  --   --    BUN 73*  --  62*  --  61*  --   --    CREATININE 2.1*  --  2.0*  --  1.9*  --   --    PHOS  --    < >  --  4.2  --  4.0 3.8    < > = values in this interval not displayed.       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 Will hold diuresis with IV Lasix  -Monitor strict urine output  - Continue to monitor renal function with daily labs and trend electrolytes  - renally dose medications  - avoid nephrotoxic agents including NSAIDs, aminoglycosides, IV contrast (unless absolutely necessary), gadolinium, fleets and other phosphorous-based laxatives  - monitor events that may lead to decreased renal perfusion (hypovolemia, hypotension, sepsis)  - monitor urine output to assure that no obstruction precipitates worsening in GFR    - improving        Chronic obstructive pulmonary disease with acute exacerbation  Clinically-stable.  Afebrile. Elevation of WBC, Productive Cough.    --Received Duo-neb x1 and 40mg PO Prednisone x1 in ED  --Will continue home regimen of maintenance inhalers  -resume home montelukast and Flonase   --Albuterol INH q6 scheduled in setting of COVID-19 infection  --PRN Oxygen per Nasal Cannula for SpO2 <90%  --Pulse-Ox  Monitoring every 4 hours  --Monitor respiratory status closely        Dyspnea  -Progressive dyspnea over the past two weeks  -may be in the setting of acute COPD exacerbation, ADHF, and COVID-19 infection  -Procalcitonin pending  -COVID-19 protocol initiated  -D.Dimer 2.38; may be multifactorial as stated above  -L >R LLE edema; bilateral LLE US - No DVTnoted  -Will initiate Lovenox 1mg/Kg daily in setting of VIRGILIO      Biventricular ICD (implantable cardioverter-defibrillator) in place  -Noted        VTE Risk Mitigation (From admission, onward)         Ordered     enoxaparin injection 60 mg  Every 24 hours (non-standard times)         06/20/22 1608     IP VTE HIGH RISK PATIENT  Once         06/20/22 1603     Place sequential compression device  Until discontinued         06/20/22 1603                Discharge Planning   ALIX: 6/22/2022     Code Status: Full Code   Is the patient medically ready for discharge?:     Reason for patient still in hospital (select all that apply): Laboratory test, Treatment, Imaging, Consult recommendations and PT / OT recommendations  Discharge Plan A: Home Health   Discharge Delays: None known at this time              Brayan Frye NP  Department of Hospital Medicine   [place] [facility]

## 2022-06-30 NOTE — ASSESSMENT & PLAN NOTE
Patient presents with shortness of breath and PELAYO. Bibasilar rales and peripheral edema on exam; L >R edema  - consistent clinical presentation;   -Troponin 0.015; likely demand; will continue to monitor and trend  -CXR revealed diffuse pulmonary edema with right small pleural effusion  - initiated lasix 40mg IV  in ED, continue at 40mg IV q12hr. Switch to po lasix   - continue home BB, will hold ARB in setting of VIRGILIO  - Daily weights  -Strict I/Os  - Monitor on telemetry  - Monitor and trend BMP, Mg, and renal function; keep K >4, Mg >2  -Sodium restriction (<2g/d), fluid restriction (<2L)   - 2D echo to assess cardiac function and valvular dysfunction.     Results for orders placed during the hospital encounter of 06/20/22    Echo    Interpretation Summary  · The left ventricle is normal in size with concentric hypertrophy and normal systolic function.  · The estimated ejection fraction is 55%.  · Grade II left ventricular diastolic dysfunction.  · Normal right ventricular size with normal right ventricular systolic function.  · Severe left atrial enlargement.  · Mild mitral regurgitation.  · Mild to moderate tricuspid regurgitation.  · Intermediate central venous pressure (8 mmHg).  · The estimated PA systolic pressure is 56 mmHg.  · There is pulmonary hypertension.  · There is a left pleural effusion.      Intake/Output Summary (Last 24 hours) at 6/29/2022 2040  Last data filed at 6/29/2022 1800  Gross per 24 hour   Intake 480 ml   Output 2600 ml   Net -2120 ml       - Plan to switch to po lasix if CXR improving

## 2022-06-30 NOTE — ASSESSMENT & PLAN NOTE
Estimated Creatinine Clearance: 18.6 mL/min (A) (based on SCr of 1.9 mg/dL (H)).  Baseline Cr: 2.1Baseline BUN:46   Recent Labs   Lab 06/26/22  0502 06/26/22  0755 06/27/22  0356 06/27/22  0910 06/28/22  0401 06/28/22  0830 06/29/22  0746 06/30/22  0805   CO2 29  --  31*  --  31*  --   --   --    BUN 73*  --  62*  --  61*  --   --   --    CREATININE 2.1*  --  2.0*  --  1.9*  --   --   --    PHOS  --    < >  --    < >  --  4.0 3.8 3.1    < > = values in this interval not displayed.       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 Will hold diuresis with IV Lasix  -Monitor strict urine output  - Continue to monitor renal function with daily labs and trend electrolytes  - renally dose medications  - avoid nephrotoxic agents including NSAIDs, aminoglycosides, IV contrast (unless absolutely necessary), gadolinium, fleets and other phosphorous-based laxatives  - monitor events that may lead to decreased renal perfusion (hypovolemia, hypotension, sepsis)  - monitor urine output to assure that no obstruction precipitates worsening in GFR    - improving

## 2022-06-30 NOTE — NURSING
While in room assisting patient back from the restroom, she stated that she was not breathing really good. After getting patient back settled in bed, I checked her O2 sats and she was 93% on 1 L O2 per NC. I instructed patient to use the IS which I had given her earlier in the shift with instructions on proper use with return demonstration. I assessed that she is capable of using IS without any further instructions. Will continue to monitor.

## 2022-06-30 NOTE — ASSESSMENT & PLAN NOTE
-Progressive dyspnea over the past two weeks  -may be in the setting of acute COPD exacerbation, ADHF, and COVID-19 infection  -Procalcitonin negative  -COVID-19 protocol initiated - On Remdesivir  -D.Dimer 2.38; may be multifactorial as stated above  -L >R LLE edema; bilateral LLE US - No DVTnoted  -Initiate Lovenox 1mg/Kg daily in setting of VIRGILIO  - Breathing treatment  - IV lasix - hold due to HHS  - Supplemental oxygen titrate as needed  - Continuous pulse ox      Patient with Hypoxic Respiratory failure which is Acute.  she is not on home oxygen. Supplemental ventilation was provided and noted- Oxygen Concentration (%):  [40] 40.   Differential diagnosis includes - CHF, COPD, Pleural effusion and COVID-19 infection, MAC, chronic Aspergillosis Labs and images were reviewed. Patient Has not has a recent ABG. Will treat underlying causes and adjust management of respiratory failure as above    - weaning oxygen  - Volume overload - switch to po lasix  - Consult Pulmonary for pulmonary effusion

## 2022-06-30 NOTE — PLAN OF CARE
SW called and left VM with [facility] at [phone]   and [phone] and left a VM requesting a return call for both numbers. SW will follow.     Berto Sierra, MSW  [phone]    Future Appointments   Date Time Provider Department Center   7/7/2022 11:00 AM CARRIE Arechiga MD [facility] OPHTHAL [facility]   7/8/2022  1:00 PM Ev Jiménez MD [facility] IMPRI [place] [facility]   7/11/2022  2:00 PM Leonides Fuentes MD [facility] DERM [facility]   7/12/2022 10:00 AM [facility] CT1 LIMIT 450 LBS [facility] CT SCAN [facility]   7/26/2022 11:00 AM Catrachita Rothman MD [facility] IM [facility] PCW   8/6/2022 10:00 AM HOME MONITOR DEVICE CHECK, [facility] ARRHPRO [facility]   8/23/2022  9:45 AM Mansi Borrero MD [facility]   8/25/2022 11:30 AM Diaz Roche MD [facility] DERMSUR [facility]   9/1/2022  9:00 AM Eric Rivera PA-C [facility] UROGYN [facility]   9/6/2022  1:30 PM Seng Salgado MD [facility] CARDIO [place]   9/26/2022  8:15 AM LAB, JORGE KEN[facility] LAB [place]   10/3/2022  9:00 AM Elidia Madison NP [facility] ENDOC [place]        06/30/22 1124   Post-Acute Status   Post-Acute Authorization Placement   Discharge Plan   Discharge Plan A Skilled Nursing Facility

## 2022-07-01 VITALS
WEIGHT: 138.44 LBS | RESPIRATION RATE: 18 BRPM | HEIGHT: 63 IN | SYSTOLIC BLOOD PRESSURE: 129 MMHG | HEART RATE: 70 BPM | DIASTOLIC BLOOD PRESSURE: 58 MMHG | BODY MASS INDEX: 24.53 KG/M2 | TEMPERATURE: 98 F | OXYGEN SATURATION: 94 %

## 2022-07-01 PROBLEM — N17.9 ACUTE KIDNEY INJURY SUPERIMPOSED ON CHRONIC KIDNEY DISEASE: Status: RESOLVED | Noted: 2020-07-27 | Resolved: 2022-07-01

## 2022-07-01 PROBLEM — E11.00 HYPEROSMOLAR HYPERGLYCEMIC STATE (HHS): Status: RESOLVED | Noted: 2022-06-22 | Resolved: 2022-07-01

## 2022-07-01 PROBLEM — N18.9 ACUTE KIDNEY INJURY SUPERIMPOSED ON CHRONIC KIDNEY DISEASE: Status: RESOLVED | Noted: 2020-07-27 | Resolved: 2022-07-01

## 2022-07-01 LAB
PHOSPHATE SERPL-MCNC: 3.6 MG/DL (ref 2.7–4.5)
POCT GLUCOSE: 248 MG/DL (ref 70–110)
POCT GLUCOSE: 90 MG/DL (ref 70–110)

## 2022-07-01 PROCEDURE — 25000003 PHARM REV CODE 250: Performed by: NURSE PRACTITIONER

## 2022-07-01 PROCEDURE — 27000221 HC OXYGEN, UP TO 24 HOURS

## 2022-07-01 PROCEDURE — 84100 ASSAY OF PHOSPHORUS: CPT | Performed by: HOSPITALIST

## 2022-07-01 PROCEDURE — 25000003 PHARM REV CODE 250

## 2022-07-01 PROCEDURE — 36415 COLL VENOUS BLD VENIPUNCTURE: CPT | Performed by: HOSPITALIST

## 2022-07-01 PROCEDURE — 99900035 HC TECH TIME PER 15 MIN (STAT)

## 2022-07-01 PROCEDURE — 94640 AIRWAY INHALATION TREATMENT: CPT

## 2022-07-01 PROCEDURE — 94660 CPAP INITIATION&MGMT: CPT

## 2022-07-01 PROCEDURE — 94761 N-INVAS EAR/PLS OXIMETRY MLT: CPT

## 2022-07-01 RX ADMIN — FLUTICASONE PROPIONATE 100 MCG: 50 SPRAY, METERED NASAL at 09:07

## 2022-07-01 RX ADMIN — FLUTICASONE FUROATE AND VILANTEROL TRIFENATATE 1 PUFF: 100; 25 POWDER RESPIRATORY (INHALATION) at 08:07

## 2022-07-01 RX ADMIN — BENZONATATE 100 MG: 100 CAPSULE ORAL at 09:07

## 2022-07-01 RX ADMIN — FUROSEMIDE 40 MG: 40 TABLET ORAL at 09:07

## 2022-07-01 RX ADMIN — OXYCODONE HYDROCHLORIDE AND ACETAMINOPHEN 500 MG: 500 TABLET ORAL at 09:07

## 2022-07-01 RX ADMIN — ACETAMINOPHEN 650 MG: 325 TABLET ORAL at 11:07

## 2022-07-01 RX ADMIN — GUAIFENESIN AND DEXTROMETHORPHAN HYDROBROMIDE 1 TABLET: 600; 30 TABLET, EXTENDED RELEASE ORAL at 09:07

## 2022-07-01 RX ADMIN — POLYETHYLENE GLYCOL 3350 17 G: 17 POWDER, FOR SOLUTION ORAL at 09:07

## 2022-07-01 RX ADMIN — CARVEDILOL 25 MG: 25 TABLET, FILM COATED ORAL at 09:07

## 2022-07-01 RX ADMIN — PRAVASTATIN SODIUM 40 MG: 40 TABLET ORAL at 09:07

## 2022-07-01 RX ADMIN — TIOTROPIUM BROMIDE 18 MCG: 18 CAPSULE ORAL; RESPIRATORY (INHALATION) at 09:07

## 2022-07-01 RX ADMIN — THERA TABS 1 TABLET: TAB at 09:07

## 2022-07-01 RX ADMIN — INSULIN ASPART 2 UNITS: 100 INJECTION, SOLUTION INTRAVENOUS; SUBCUTANEOUS at 09:07

## 2022-07-01 RX ADMIN — INSULIN ASPART 2 UNITS: 100 INJECTION, SOLUTION INTRAVENOUS; SUBCUTANEOUS at 12:07

## 2022-07-01 RX ADMIN — HYDRALAZINE HYDROCHLORIDE 100 MG: 25 TABLET, FILM COATED ORAL at 09:07

## 2022-07-01 RX ADMIN — SODIUM BICARBONATE 1300 MG: 650 TABLET ORAL at 09:07

## 2022-07-01 RX ADMIN — INSULIN ASPART 4 UNITS: 100 INJECTION, SOLUTION INTRAVENOUS; SUBCUTANEOUS at 12:07

## 2022-07-01 NOTE — PLAN OF CARE
[facility] - Telemetry      HOME HEALTH ORDERS  FACE TO FACE ENCOUNTER    Patient Name: Myesha Quinones  YOB: 1940    PCP: Catrachita Rothman MD   PCP Address: [address] [place] [address]  PCP Phone Number: [phone]  PCP Fax: [phone]    Encounter Date: 6/20/22    Admit to Home Health    Diagnoses:  Active Hospital Problems    Diagnosis  POA    *Acute respiratory failure with hypoxia [J96.01]  Yes    UTI (urinary tract infection) [N39.0]  Yes    Acute on chronic combined systolic and diastolic CHF (congestive heart failure) [I50.43]  Yes    COVID-19 virus infection [U07.1]  Yes    Essential hypertension [I10]  Yes    Type 2 diabetes mellitus, with long-term current use of insulin [E11.9, Z79.4]  Not Applicable    Mycobacterium avium complex [A31.0]  Yes    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  Lauren placed 2/28/22.   --recurrent retention, lauren replaced 4/1/22  --Pessary placed per urogyn and lauren removed  --admitted 6/2022 and incomplete emptying despite pessary      Hyperlipidemia associated with type 2 diabetes mellitus [E11.69, E78.5]  Yes    Chronic obstructive pulmonary disease with acute exacerbation [J44.1]  Yes    Biventricular ICD (implantable cardioverter-defibrillator) in place [Z95.810]  Yes      Resolved Hospital Problems    Diagnosis Date Resolved POA    Hyperosmolar hyperglycemic state (HHS) [E11.00, E11.65] 07/01/2022 No    Acute kidney injury superimposed on chronic kidney disease [N17.9, N18.9] 07/01/2022 Yes       Follow Up Appointments:  Future Appointments   Date Time Provider Department Center   7/7/2022 11:00 AM CARRIE Arechiga MD [facility] OPHTHAL [facility] [place]   7/8/2022  1:00 PM Ev Jiménez MD [facility] IMPRI [facility]   7/11/2022  2:00 PM Leonides Fuentes MD [facility] DERM [facility]   7/12/2022 10:00 AM [facility] CT1 LIMIT 450 LBS [facility] CT SCAN [facility]   7/26/2022 11:00 AM Catrachita Rothman MD [facility] IM [facility] PCW   8/6/2022 10:00 AM HOME MONITOR DEVICE CHECK, [facility] ARRHPRO [facility]   8/23/2022  9:45 AM Mansi Borrero MD [facility]   8/25/2022 11:30 AM Diaz Roche MD [facility] DERMSUR [facility]   9/1/2022  9:00 AM Eric Rivera PA-C [facility] UROGYN [facility]   9/6/2022  1:30 PM Seng Salgado MD [facility] CARDIO [place]   9/26/2022  8:15 AM LAB, JANES KENH [facility] [place]   10/3/2022  9:00 AM Elidia Madison NP [facility]       Allergies:  Review of patient's allergies indicates:   Allergen Reactions    Iodine and iodide containing products Hives    Nifedipine      weakness       Medications: Review discharge medications with patient and family and provide education.    Current Facility-Administered Medications   Medication Dose Route Frequency Provider Last Rate Last Admin    acetaminophen tablet 650 mg  650 mg Oral Q4H PRN Dilma T. Orr-Joshua, NP   650 mg at 07/01/22 1137    albuterol inhaler 2 puff  2 puff Inhalation Q4H PRN Brayan Frye NP   2 puff at 06/30/22 1251    ascorbic acid (vitamin C) tablet 500 mg  500 mg Oral BID Dilma T. Orr-Joshua, NP   500 mg at 07/01/22 0935    benzonatate capsule 100 mg  100 mg Oral TID PRN Dilma T. Orr-Joshua, NP   100 mg at 07/01/22 0935    carvediloL tablet 25 mg  25 mg Oral BID Dilma T. Orr-Joshua, NP   25 mg at 07/01/22 0935    dextromethorphan-guaiFENesin  mg per 12 hr tablet 1 tablet  1 tablet Oral BID Malissa Gray, NP   1 tablet at 07/01/22 0935    dextrose 10% bolus 125 mL  12.5 g Intravenous PRN Caroline Anderson MD        dextrose 10% bolus 250 mL  25 g Intravenous PRN Caroline Anderson MD        enoxaparin injection 60 mg  1 mg/kg Subcutaneous Q24H Dilma Brooke NP   60 mg at 06/30/22 1712    fluticasone furoate-vilanteroL 100-25 mcg/dose diskus inhaler 1 puff  1 puff Inhalation Daily Dilma Brooke NP   1 puff at 07/01/22 0810    fluticasone  propionate 50 mcg/actuation nasal spray 100 mcg  2 spray Each Nostril Daily Dilma TRandi Brooke, NP   100 mcg at 07/01/22 0935    furosemide tablet 40 mg  40 mg Oral BID Malissa Gray NP   40 mg at 07/01/22 0935    glucagon (human recombinant) injection 1 mg  1 mg Intramuscular PRN Dilma T. Edwardo, NP        glucose chewable tablet 16 g  16 g Oral PRN Dilma T. Edwardo, NP        glucose chewable tablet 24 g  24 g Oral PRN Dilma TRandi Brooke, SHELBI        guaiFENesin 100 mg/5 ml syrup 200 mg  200 mg Oral Q4H PRN Brayan Frye NP   200 mg at 06/30/22 2141    hydrALAZINE injection 10 mg  10 mg Intravenous Q8H PRN Dilma T. Edwardo, NP   10 mg at 06/29/22 1855    hydrALAZINE tablet 100 mg  100 mg Oral BID Dilma T. Edwardo, NP   100 mg at 07/01/22 0935    insulin aspart U-100 pen 0-5 Units  0-5 Units Subcutaneous QID (AC + HS) PRN Tish Moreno NP   1 Units at 06/30/22 2129    insulin aspart U-100 pen 2 Units  2 Units Subcutaneous TIDWM Malissa Gray NP   2 Units at 07/01/22 0934    insulin detemir U-100 pen 8 Units  8 Units Subcutaneous BID Malissa Gray NP   8 Units at 07/01/22 0936    melatonin tablet 6 mg  6 mg Oral Nightly PRN Celeste Frazier NP   6 mg at 06/27/22 2158    montelukast tablet 10 mg  10 mg Oral QHS Dilma T. Edwardo, NP   10 mg at 06/30/22 2128    multivitamin tablet  1 tablet Oral Daily Dilma SANA Brooke, NP   1 tablet at 07/01/22 0935    nitroGLYCERIN SL tablet 0.4 mg  0.4 mg Sublingual Q5 Min PRN Dilma T. SHELBI Brooke        ondansetron injection 4 mg  4 mg Intravenous Q6H PRN Dilma TRandi Brooke, NP   4 mg at 06/23/22 1758    polyethylene glycol packet 17 g  17 g Oral BID Malissa Gray NP   17 g at 07/01/22 0935    pravastatin tablet 40 mg  40 mg Oral Daily Dilma Brooke NP   40 mg at 07/01/22 0935    sodium bicarbonate tablet 1,300 mg  1,300 mg Oral TID Dilma SANA Orr-Joshua, NP   1,300 mg at 07/01/22 0935    sodium  chloride 0.9% flush 10 mL  10 mL Intravenous PRN Dilma T. Orr-Joshua, NP        sodium chloride 0.9% flush 10 mL  10 mL Intravenous PRN Dilma T. Orr-Joshua, NP        sodium chloride 0.9% flush 10 mL  10 mL Intravenous PRN Dilma T. Orr-Joshua, NP        sodium chloride 0.9% flush 10 mL  10 mL Intravenous PRN Malissa Gray, NP        sodium chloride 0.9% flush 10 mL  10 mL Intravenous PRN Misonya Peresen, NP        tiotropium inhalation capsule 18 mcg  18 mcg Inhalation Daily Dilma T. Orr-Joshua, NP   18 mcg at 07/01/22 0900     Current Discharge Medication List      CONTINUE these medications which have NOT CHANGED    Details   albuterol (PROVENTIL/VENTOLIN HFA) 90 mcg/actuation inhaler INHALE 2 PUFFS INTO THE LUNGS EVERY 6 (SIX) HOURS AS NEEDED FOR WHEEZING. RESCUE  Qty: 18 g, Refills: 12    Associated Diagnoses: Cough      albuterol-ipratropium (DUO-NEB) 2.5 mg-0.5 mg/3 mL nebulizer solution TAKE 3 MLS BY NEBULIZATION EVERY 4 (FOUR) HOURS AS NEEDED FOR WHEEZING.  Qty: 270 mL, Refills: 12      benzonatate (TESSALON) 100 MG capsule Take 1 capsule (100 mg total) by mouth 3 (three) times daily as needed for Cough.  Qty: 20 capsule, Refills: 0      blood sugar diagnostic (ACCU-CHEK ANGELICA) Strp Uses Accu-Check Angelica meter to test BG 4x/day  Qty: 400 strip, Refills: 6    Associated Diagnoses: Diabetic polyneuropathy associated with type 2 diabetes mellitus      carvediloL (COREG) 25 MG tablet TAKE 2 TABLETS (50 MG TOTAL) BY MOUTH 2 (TWO) TIMES DAILY.  Qty: 360 tablet, Refills: 3    Comments: .      cholecalciferol, vitamin D3, (VITAMIN D3) 50 mcg (2,000 unit) Tab Take 1 tablet by mouth once daily.       estradioL (ESTRACE) 0.01 % (0.1 mg/gram) vaginal cream Place 1 g vaginally every Mon, Wed, Fri.  Qty: 153 g, Refills: 6      fluticasone propionate (FLONASE) 50 mcg/actuation nasal spray 2 sprays (100 mcg total) by Each Nostril route once daily.  Qty: 16 g, Refills: 12      fluticasone-salmeterol diskus inhaler  250-50 mcg Inhale 1 puff into the lungs as needed. Controller      furosemide (LASIX) 40 MG tablet Take 1 tablet (40 mg total) by mouth every 8 (eight) hours as needed (swelling).  Qty: 90 tablet, Refills: 3    Associated Diagnoses: Cardiomyopathy, ischemic; Chronic combined systolic (congestive) and diastolic (congestive) heart failure      hydrALAZINE (APRESOLINE) 100 MG tablet Take 1 tablet (100 mg total) by mouth 2 (two) times daily.  Qty: 180 tablet, Refills: 3    Comments: .  Associated Diagnoses: Essential hypertension      insulin (LANTUS SOLOSTAR U-100 INSULIN) glargine 100 units/mL (3mL) SubQ pen Inject 22 Units into the skin once daily.  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  Refills: 0      montelukast (SINGULAIR) 10 mg tablet Take 1 tablet (10 mg total) by mouth every evening.  Qty: 90 tablet, Refills: 3    Associated Diagnoses: Mild intermittent chronic asthma without complication      nitroGLYCERIN (NITROSTAT) 0.4 MG SL tablet Place 0.4 mg under the tongue every 5 (five) minutes as needed for  "Chest pain.       omega-3 fatty acids 500 mg Cap Take 1 capsule by mouth Twice daily.      pen needle, diabetic (BD ULTRA-FINE MINI PEN NEEDLE) 31 gauge x 3/16" Ndle USE WITH LANTUS AT BEDTIME  Qty: 400 each, Refills: 3    Associated Diagnoses: Diabetic polyneuropathy associated with type 2 diabetes mellitus      pravastatin (PRAVACHOL) 40 MG tablet TAKE 1 TABLET BY MOUTH EVERY DAY  Qty: 90 tablet, Refills: 1    Associated Diagnoses: Mixed hyperlipidemia      pulse oximeter (PULSE OXIMETER) device by Apply Externally route 2 (two) times a day. Use twice daily at 8 AM and 3 PM and record the value in MyChart as directed.  Qty: 1 each, Refills: 0    Comments: This is a NO CHARGE item.  Please override price to zero.  DO NOT PRINT.  NORMAL MODE e-PRESCRIBE ONLY.      sodium bicarbonate 650 MG tablet Take 2 tablets (1,300 mg total) by mouth 3 (three) times daily.  Qty: 180 tablet, Refills: 11    Associated Diagnoses: CKD (chronic kidney disease) stage 4, GFR 15-29 ml/min      tiotropium (SPIRIVA) 18 mcg inhalation capsule Inhale 1 capsule (18 mcg total) into the lungs once daily. Controller  Qty: 90 capsule, Refills: 3    Associated Diagnoses: SOB (shortness of breath); Mild intermittent chronic asthma without complication      valsartan (DIOVAN) 80 MG tablet Take 1 tablet (80 mg total) by mouth 2 (two) times daily.  Qty: 180 tablet, Refills: 3    Comments: .      levocetirizine (XYZAL) 5 MG tablet Take 5 mg by mouth as needed.      polyethylene glycol (GLYCOLAX) 17 gram PwPk Take 17 g by mouth daily as needed (constipation).  Qty: 10 each, Refills: 1      sodium chloride 3% 3 % nebulizer solution TAKE 4 MLS BY NEBULIZATION 4 (FOUR) TIMES DAILY AS NEEDED FOR OTHER OR COUGH (TO INDUCE SPUTUM AND CLEAR MUCOUS.).  Qty: 750 mL, Refills: 11    Associated Diagnoses: Severe persistent chronic asthma without complication         STOP taking these medications       predniSONE (DELTASONE) 10 MG tablet Comments:   Reason for " Stopping:                 I have seen and examined this patient within the last 30 days. My clinical findings that support the need for the home health skilled services and home bound status are the following:no   Weakness/numbness causing balance and gait disturbance due to Infection and Weakness/Debility making it taxing to leave home.     Diet:   cardiac diet      Referrals/ Consults  Physical Therapy to evaluate and treat. Evaluate for home safety and equipment needs; Establish/upgrade home exercise program. Perform / instruct on therapeutic exercises, gait training, transfer training, and Range of Motion.  Occupational Therapy to evaluate and treat. Evaluate home environment for safety and equipment needs. Perform/Instruct on transfers, ADL training, ROM, and therapeutic exercises.   to evaluate for community resources/long-range planning.  Aide to provide assistance with personal care, ADLs, and vital signs.    Activities:   activity as tolerated    Nursing:   Agency to admit patient within 24 hours of hospital discharge unless specified on physician order or at patient request    SN to complete comprehensive assessment including routine vital signs. Instruct on disease process and s/s of complications to report to MD. Review/verify medication list sent home with the patient at time of discharge  and instruct patient/caregiver as needed. Frequency may be adjusted depending on start of care date.     Skilled nurse to perform up to 3 visits PRN for symptoms related to diagnosis    Notify MD if SBP > 160 or < 90; DBP > 90 or < 50; HR > 120 or < 50; Temp > 101; O2 < 88%    Ok to schedule additional visits based on staff availability and patient request on consecutive days within the home health episode.    When multiple disciplines ordered:    Start of Care occurs on Sunday - Wednesday schedule remaining discipline evaluations as ordered on separate consecutive days following the start of  care.    Thursday SOC -schedule subsequent evaluations Friday and Monday the following week.     Friday - Saturday SOC - schedule subsequent discipline evaluations on consecutive days starting Monday of the following week.    For all post-discharge communication and subsequent orders please contact patient's primary care physician. If unable to reach primary care physician or do not receive response within 30 minutes, please contact  for clinical staff order clarification    Miscellaneous   [place] Care: Instruct patient/caregiver to empty [place] bag.  Change [place] every month.  Routine Skin for Bedridden Patients: Instruct patient/caregiver to apply moisture barrier cream to all skin folds and wet areas in perineal area daily and after baths and all bowel movements.    Home Health Aide:  Nursing Three times weekly, Physical Therapy Three times weekly, Occupational Therapy Three times weekly, Medical Social Work Weekly, Respiratory Therapy Daily and Home Health Aide Daily        I certify that this patient is confined to her home and needs intermittent skilled nursing care, physical therapy and occupational therapy.

## 2022-07-01 NOTE — NURSING
Home Oxygen Evaluation    Date Performed: 2022    1) Patient's Home O2 Sat on room air, while at rest: 95      If O2 sats on room air at rest are 88% or below, patient qualifies. No additional testing needed. Document N/A in steps 2 and 3. If 89% or above, complete steps 2.      2) Patient's O2 Sat on room air while exercisin    If O2 sats on room air while exercising remain 89% or above patient does not qualify, no further testing needed Document N/A in step 3. If O2 sats on room air while exercising are 88% or below, continue to step 3.      3) Patient's O2 Sat while exercising on O2: NC at 2 LPM    94% on 2L/NC     (Must show improvement from #2 for patients to qualify)    If O2 sats improve on oxygen, patient qualifies for portable oxygen. If not, the patient does not qualify.

## 2022-07-01 NOTE — DISCHARGE SUMMARY
"[facility] Medicine  Discharge Summary      Patient Name: Myesha Quinones  MRN: [id number]  Patient Class: IP- Inpatient  Admission Date: 6/20/2022  Hospital Length of Stay: 9 days  Discharge Date and Time:  07/01/2022 1:05 PM  Attending Physician: Yrn Sheppard MD   Discharging Provider: Brayan Frye NP  Primary Care Provider: Catrachita Rothman MD      HPI:   Myesha Quinones has a past medical history of CAD, combined systolic and diastolic heart failure, LBBB, biventricular ICD, CKD4, IDDM, breast cancer, HTN, HLD, severe persistent asthma, COPD, and HLD presents to [facility] ED with complaints of SOB. She states her SOB is associated with a productive cough. She states her SOB has been progressively worsened over the past two weeks. She reports she was seen by her pulmonologist and was prescribed antibiotic and prednisone which she started last Tuesday. She states she did not complete her antibiotics or steroid therapy. She denies fever, chest pain, palpitations, N/V, or diarrhea.     She was seen by her pulmonolgist, Dr. Maki on 6/13/22 for shortness of breath. Reported cough and congestion attributing to sinus infection. She was also noted to have a current MAC infection which she is not on therapy. She was started on 10 day course of Augmentin with 10mg PO prednisone to accompany.     Of Note: She was primarily followed by ID, Dr. Amaya, for ABPA (allergic bronchopulmonary aspergillosis) and was started on Voriconazole therapy in which she did not tolerate.       In the ED: BP (!) 165/107   Pulse 83   Temp 98 °F (36.7 °C) (Axillary)   Resp (!) 22   Ht 5' 3" (1.6 m)   Wt 59 kg (130 lb)   LMP  (LMP Unknown)   SpO2 (!) 94%   BMI 23.03 kg/m² Labs revealed WBC 13.35, H/H 9/27.8, BUN 64, creatinine 2.8, Glucose 161, . Troponin 0.015. 12 lead EKG revealed Normal sinus rhythm, Left axis deviation, Right bundle branch block. She is Covid positive. CXR revealed diffuse " pulmonary edema with a right small pleural effusion. She received Duo-neb tx x1, Lasix 40mg IV x1, and Prednisone 40mg PO x1. ID consulted to follow.         * No surgery found *      Hospital Course:   Patient presented to the hospital for SOB evaluation.  She was admitted to the hospital medicine for acute on chronic respiratory failure secondary to COVID-19 infection and CHF exacerbation. Patient was treated with COVID-19 protocol with remdesivir/steroid, supportive care with oxygen, and started on IV lasix. Patient did not tolerate steroid therapy and developed HHS which required short-time ICU stay. She was evaluated by ID and was placed on 5 day course of IV cefepime due to hx of MAC/ABPA and pseudomonas found on recent sputum culture. Pulmonology was consulted for bilateral pleural effusion. Bedside US revealed small bilateral effusion no pocket large enough for therapeutic thoracentesis. Pulmonology recommended continue diuresis therapy. 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 Per Urology, continuing lauren upon discharge and they would see her in clinic after 1-2 weeks for void trials. Patient was gradually clinically improved after finished antibiotic/antiviral courses and was able to switch to oral lasix. PT/OT recommended HH upon discharge--working on skilled nursing per family request. Patients family states they do not like any of the skilled facilities that have accepted the patient. We will move forward with DC to home with HH as requested by the family. Will keep lauren catheter in place 22 retention. Urology consult has been placed for outpatient follow up.       Goals of Care Treatment Preferences:  Code Status: Full Code      Consults:   Consults (From admission, onward)        Status Ordering Provider     Inpatient consult to Pulmonology  Once        Provider:  (Not yet assigned)    Completed NAZANIN MOROCHO     Inpatient consult to Urology   Once        Provider:  (Not yet assigned)    Completed NAZANIN MOROCHO     Inpatient virtual consult to Hospital Medicine  Once        Provider:  (Not yet assigned)    Completed NAZANIN MOROCHO     Inpatient consult to Anesthesiology  Once        Provider:  (Not yet assigned)    Acknowledged TUYET MATHEW     Inpatient consult to Registered Dietitian/Nutritionist  Once        Provider:  (Not yet assigned)    Completed JAYCE GIBSON TRandi     Inpatient consult to Infectious Diseases  Once        Provider:  (Not yet assigned)    Completed JAYCE GIBSON TRandi     Inpatient consult to Social Work/Case Management  Once        Provider:  (Not yet assigned)    Acknowledged JAYCE GIBSON TRandi     Inpatient consult to Registered Dietitian/Nutritionist  Once        Provider:  (Not yet assigned)    Completed JAYCE GIBSON T.          No new Assessment & Plan notes have been filed under this hospital service since the last note was generated.  Service: Hospital Medicine    Final Active Diagnoses:    Diagnosis Date Noted POA    PRINCIPAL PROBLEM:  Acute respiratory failure with hypoxia [J96.01] 06/21/2022 Yes    UTI (urinary tract infection) [N39.0] 06/21/2022 Yes    Acute on chronic combined systolic and diastolic CHF (congestive heart failure) [I50.43] 06/20/2022 Yes    COVID-19 virus infection [U07.1] 06/20/2022 Yes    Essential hypertension [I10] 06/20/2022 Yes    Type 2 diabetes mellitus, with long-term current use of insulin [E11.9, Z79.4] 06/20/2022 Not Applicable    Mycobacterium avium complex [A31.0]  Yes    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] 04/02/2022 Yes    Urinary retention [R33.9] 02/28/2022 Yes    Hyperlipidemia associated with type 2 diabetes mellitus [E11.69, E78.5] 07/27/2020 Yes    Chronic obstructive pulmonary disease with acute exacerbation [J44.1] 04/04/2018 Yes    Biventricular ICD (implantable cardioverter-defibrillator) in place [Z95.810] 01/09/2014 Yes       Problems Resolved During this Admission:    Diagnosis Date Noted Date Resolved POA    Hyperosmolar hyperglycemic state (HHS) [E11.00, E11.65] 06/22/2022 07/01/2022 No    Acute kidney injury superimposed on chronic kidney disease [N17.9, N18.9] 07/27/2020 07/01/2022 Yes       Discharged Condition: stable    Disposition: Home or Self Care    Follow Up:   Follow-up Information     Catrachita Rothman MD Follow up in 1 week(s).    Specialty: Internal Medicine  Contact information:  [address] [place] [id number]  [phone]                       Patient Instructions:      Ambulatory referral/consult to Urology   Standing Status: Future   Referral Priority: Routine Referral Type: Consultation   Referral Reason: Specialty Services Required   Requested Specialty: Urology   Number of Visits Requested: 1     Diet Cardiac     Notify your health care provider if you experience any of the following:  temperature >100.4     Notify your health care provider if you experience any of the following:  persistent nausea and vomiting or diarrhea     Notify your health care provider if you experience any of the following:  severe uncontrolled pain     Notify your health care provider if you experience any of the following:  redness, tenderness, or signs of infection (pain, swelling, redness, odor or green/yellow discharge around incision site)     Notify your health care provider if you experience any of the following:  difficulty breathing or increased cough     Notify your health care provider if you experience any of the following:  severe persistent headache     Notify your health care provider if you experience any of the following:  persistent dizziness, light-headedness, or visual disturbances     Notify your health care provider if you experience any of the following:  increased confusion or weakness     Activity as tolerated       Significant Diagnostic Studies: Labs: All labs within the past 24 hours have been  reviewed    Pending Diagnostic Studies:     None         Medications:  Reconciled Home Medications:      Medication List      CHANGE how you take these medications    fluticasone propionate 50 mcg/actuation nasal spray  Commonly known as: FLONASE  2 sprays (100 mcg total) by Each Nostril route once daily.  What changed:   · when to take this  · reasons to take this     montelukast 10 mg tablet  Commonly known as: SINGULAIR  Take 1 tablet (10 mg total) by mouth every evening.  What changed:   · when to take this  · reasons to take this        CONTINUE taking these medications    albuterol 90 mcg/actuation inhaler  Commonly known as: PROVENTIL/VENTOLIN HFA  INHALE 2 PUFFS INTO THE LUNGS EVERY 6 (SIX) HOURS AS NEEDED FOR WHEEZING. RESCUE     albuterol-ipratropium 2.5 mg-0.5 mg/3 mL nebulizer solution  Commonly known as: DUO-NEB  TAKE 3 MLS BY NEBULIZATION EVERY 4 (FOUR) HOURS AS NEEDED FOR WHEEZING.     benzonatate 100 MG capsule  Commonly known as: TESSALON  Take 1 capsule (100 mg total) by mouth 3 (three) times daily as needed for Cough.     blood sugar diagnostic Strp  Commonly known as: ACCU-CHEK HECTOR  Uses Accu-Check Hector meter to test BG 4x/day     carvediloL 25 MG tablet  Commonly known as: COREG  TAKE 2 TABLETS (50 MG TOTAL) BY MOUTH 2 (TWO) TIMES DAILY.     cholecalciferol (vitamin D3) 50 mcg (2,000 unit) Tab  Commonly known as: VITAMIN D3  Take 1 tablet by mouth once daily.     estradioL 0.01 % (0.1 mg/gram) vaginal cream  Commonly known as: ESTRACE  Place 1 g vaginally every Mon, Wed, Fri.     fluticasone-salmeterol 250-50 mcg/dose 250-50 mcg/dose diskus inhaler  Commonly known as: ADVAIR  Inhale 1 puff into the lungs as needed. Controller     furosemide 40 MG tablet  Commonly known as: LASIX  Take 1 tablet (40 mg total) by mouth every 8 (eight) hours as needed (swelling).     hydrALAZINE 100 MG tablet  Commonly known as: APRESOLINE  Take 1 tablet (100 mg total) by mouth 2 (two) times daily.     lancets  "Misc  Commonly known as: ACCU-CHEK SOFTCLIX LANCETS  Uses Accu-Chek Angelica meter to test BG 1x/day     LANTUS SOLOSTAR U-100 INSULIN glargine 100 units/mL SubQ pen  Generic drug: insulin  Inject 22 Units into the skin once daily.     levocetirizine 5 MG tablet  Commonly known as: XYZAL  Take 5 mg by mouth as needed.     magnesium oxide 400 mg (241.3 mg magnesium) tablet  Commonly known as: MAG-OX  Take 1 tablet (400 mg total) by mouth once daily.     nitroGLYCERIN 0.4 MG SL tablet  Commonly known as: NITROSTAT  Place 0.4 mg under the tongue every 5 (five) minutes as needed for Chest pain.     omega-3 fatty acids 500 mg Cap  Take 1 capsule by mouth Twice daily.     pen needle, diabetic 31 gauge x 3/16" Ndle  Commonly known as: BD ULTRA-FINE MINI PEN NEEDLE  USE WITH LANTUS AT BEDTIME     polyethylene glycol 17 gram Pwpk  Commonly known as: GLYCOLAX  Take 17 g by mouth daily as needed (constipation).     pravastatin 40 MG tablet  Commonly known as: PRAVACHOL  TAKE 1 TABLET BY MOUTH EVERY DAY     pulse oximeter device  Commonly known as: pulse oximeter  by Apply Externally route 2 (two) times a day. Use twice daily at 8 AM and 3 PM and record the value in [employer] as directed.     sodium bicarbonate 650 MG tablet  Take 2 tablets (1,300 mg total) by mouth 3 (three) times daily.     sodium chloride 3% 3 % nebulizer solution  TAKE 4 MLS BY NEBULIZATION 4 (FOUR) TIMES DAILY AS NEEDED FOR OTHER OR COUGH (TO INDUCE SPUTUM AND CLEAR MUCOUS.).     tiotropium 18 mcg inhalation capsule  Commonly known as: SPIRIVA  Inhale 1 capsule (18 mcg total) into the lungs once daily. Controller     TRADJENTA 5 mg Tab tablet  Generic drug: linaGLIPtin  Take 1 tablet (5 mg total) by mouth once daily.     valsartan 80 MG tablet  Commonly known as: DIOVAN  Take 1 tablet (80 mg total) by mouth 2 (two) times daily.        STOP taking these medications    predniSONE 10 MG tablet  Commonly known as: DELTASONE            Indwelling Lines/Drains at " time of discharge:   Lines/Drains/Airways     Drain  Duration                Urethral Catheter 06/27/22 0645 4 days                Time spent on the discharge of patient: 35 minutes         Brayan Frye NP  Department of [facility] Medicine  [facility]

## 2022-07-01 NOTE — PLAN OF CARE
SW met with pt at bedside to complete final assessment. Pt was approved to receive HH with [facility]. Pt will have her son Rigo [phone] will help transport her home. Rounds completed on pt.  All questions addressed.  Bedside nurse to discuss d/c medications.  Discussed importance to attend all f/u appts and take medications as prescribed.  Verbalized understanding.    Berto Sierra, MSW  [phone]    Future Appointments   Date Time Provider Department Center   7/7/2022 11:00 AM CARRIE Arechiga MD [facility] OPHTHAL [facility]   7/8/2022  1:00 PM Ev Jiménez MD [facility] IMPRI [facility]   7/11/2022  2:00 PM Leonides Fuentes MD [facility] DERM [facility]   7/12/2022 10:00 AM [facility] CT1 LIMIT 450 LBS [facility] CT SCAN [facility]   7/26/2022 11:00 AM Catrachita Rothman MD [facility] IM [facility] PCW   8/6/2022 10:00 AM HOME MONITOR DEVICE CHECK, [facility] ARRHPRO [facility]   8/23/2022  9:45 AM Mansi Borrero MD [facility]   8/25/2022 11:30 AM Diaz Roche MD [facility] DERMSUR [facility]   9/1/2022  9:00 AM Eric Rivera PA-C [facility] UROGYN [facility]   9/6/2022  1:30 PM Seng Salgado MD [facility] CARDIO [place]   9/26/2022  8:15 AM LAB, [facility] [place]   10/3/2022  9:00 AM Elidia Madison NP [facility] ENDOC [place]        07/01/22 1251   Final Note   Assessment Type Final Discharge Note   Anticipated Discharge Disposition Home   What phone number can be called within the next 1-3 days to see how you are doing after discharge? [phone]   Hospital Resources/Appts/Education Provided Appointments scheduled and added to AVS   Post-Acute Status   Post-Acute Authorization Home Health   Home Health Status Set-up Complete/Auth obtained   Discharge Delays None known at this time

## 2022-07-01 NOTE — NURSING
Myesha Quinones is a 82 y.o.  female patient, who presents for a 6 minute walk test ordered by RAI Frye NP.  The diagnosis is Qualify for Oxygen.  The patient's BMI is 24.5kg/m2.    Predicted distance (lower limit of normal) is   meters.      Test Results:    The test was completed with stops.  The patient stopped 1 times for a total of 60 seconds.  The total time walked was 300 seconds.  During walking, the patient reported:  Dyspnea.    07/01/2022---------Distance: 18.29 meters (60 feet)     O2 Sat % Supplemental Oxygen Heart Rate Blood Pressure Dalton Scale   Pre-exercise  (Resting) 95 % Room Air 67 bpm 122/58 0   During Exercise 89 % Room Air 71 bpm 127/62 3   Post-exercise  (Recovery) 94 % 2 L/M  68 bpm 124/56 2     Recovery Time: 60 seconds    Performing nurse/tech: AURA Galan RN    CALCULATIONS:    Number of laps:   x 200ft. + Final Partial Lab:   ft. =      Conversion to meters:   ft. x .3048 =   meters    SUMMARY/INTERPRETATION:    Total distance walked:   meters  Predicted distance:   meters  Percentage predicted:  %    PREVIOUS STUDY:     Date:    Prior 6 minute walk:   meters  Percentage change:      The patient has not had a previous study.      CLINICAL INTERPRETATION:  Six minute walk distance is 18.29 meters (60 feet) with moderate dyspnea.  During exercise, there was significant desaturation while breathing room air.

## 2022-07-01 NOTE — PLAN OF CARE
Discharge order noted; will prepare AVS once discharge order reconciliation complete by admitting team.

## 2022-07-01 NOTE — TREATMENT PLAN
PCC folder given to patient's nurse to give to patient's bedside nurse Arlene upon discharge.    Future Appointments   Date Time Provider Department Center   7/7/2022 11:00 AM CARRIE Arechiga MD [facility] OPHTHAL [facility]   7/8/2022  1:00 PM Ev Jiménez MD [facility] IMPRI [facility]   7/11/2022  2:00 PM Leonides Fuentes MD [facility] DERM [facility]   7/12/2022 10:00 AM [facility] CT1 LIMIT 450 LBS [facility] CT SCAN [facility]   7/12/2022 11:30 AM Steffen Osborn MD [facility] UROLOGY [facility]   7/26/2022 11:00 AM Catrachita Rothman MD [facility] IM [facility] PCW   8/6/2022 10:00 AM HOME MONITOR DEVICE CHECK, [facility] ARRHPRO [facility]   8/23/2022  9:45 AM Mansi Borrero MD [facility]   8/25/2022 11:30 AM Diaz Roche MD [facility] DERMSUR [facility]   9/1/2022  9:00 AM Eric Rivera PA-C [facility] [place] UROGYN [facility]   9/6/2022  1:30 PM Seng Salgado MD [facility] CARDIO [place]   9/26/2022  8:15 AM LAB, [facility] [place]   10/3/2022  9:00 AM Elidia Madison NP [facility] [place]

## 2022-07-01 NOTE — PLAN OF CARE
VN note: VN completed AVS and attachments and notified bedside nurseArlene. Will cont to be available and intervene prn.

## 2022-07-02 ENCOUNTER — PATIENT MESSAGE (OUTPATIENT)
Dept: ENDOCRINOLOGY | Facility: CLINIC | Age: 82
End: 2022-07-02
Payer: MEDICARE

## 2022-07-04 ENCOUNTER — PATIENT OUTREACH (OUTPATIENT)
Dept: ADMINISTRATIVE | Facility: CLINIC | Age: 82
End: 2022-07-04
Payer: MEDICARE

## 2022-07-06 ENCOUNTER — OUTPATIENT CASE MANAGEMENT (OUTPATIENT)
Dept: ADMINISTRATIVE | Facility: OTHER | Age: 82
End: 2022-07-06
Payer: MEDICARE

## 2022-07-06 NOTE — PROGRESS NOTES
SW received a referral on the above patient Per chart review . Patient assessed for community resources by Danni CARLISLE on 06/29/2022. SW will close case as needs addressed.

## 2022-07-07 ENCOUNTER — PATIENT MESSAGE (OUTPATIENT)
Dept: UROLOGY | Facility: CLINIC | Age: 82
End: 2022-07-07
Payer: MEDICARE

## 2022-07-07 ENCOUNTER — PATIENT OUTREACH (OUTPATIENT)
Dept: ADMINISTRATIVE | Facility: OTHER | Age: 82
End: 2022-07-07
Payer: MEDICARE

## 2022-07-07 ENCOUNTER — PROCEDURE VISIT (OUTPATIENT)
Dept: OPHTHALMOLOGY | Facility: CLINIC | Age: 82
End: 2022-07-07
Payer: MEDICARE

## 2022-07-07 DIAGNOSIS — H35.012 RETINAL MACROANEURYSM OF LEFT EYE: ICD-10-CM

## 2022-07-07 DIAGNOSIS — Z79.4 CONTROLLED TYPE 2 DIABETES MELLITUS WITH BOTH EYES AFFECTED BY MILD NONPROLIFERATIVE RETINOPATHY AND MACULAR EDEMA, WITH LONG-TERM CURRENT USE OF INSULIN: Primary | ICD-10-CM

## 2022-07-07 DIAGNOSIS — E11.3213 CONTROLLED TYPE 2 DIABETES MELLITUS WITH BOTH EYES AFFECTED BY MILD NONPROLIFERATIVE RETINOPATHY AND MACULAR EDEMA, WITH LONG-TERM CURRENT USE OF INSULIN: Primary | ICD-10-CM

## 2022-07-07 DIAGNOSIS — H43.12 VITREOUS HEMORRHAGE, LEFT: ICD-10-CM

## 2022-07-07 PROCEDURE — 92014 PR EYE EXAM, EST PATIENT,COMPREHESV: ICD-10-PCS | Mod: 25,S$GLB,, | Performed by: OPHTHALMOLOGY

## 2022-07-07 PROCEDURE — 67028 INJECTION EYE DRUG: CPT | Mod: LT,S$GLB,, | Performed by: OPHTHALMOLOGY

## 2022-07-07 PROCEDURE — 92134 POSTERIOR SEGMENT OCT RETINA (OCULAR COHERENCE TOMOGRAPHY)-BOTH EYES: ICD-10-PCS | Mod: S$GLB,,, | Performed by: OPHTHALMOLOGY

## 2022-07-07 PROCEDURE — 67028 PR INJECT INTRAVITREAL PHARMCOLOGIC: ICD-10-PCS | Mod: LT,S$GLB,, | Performed by: OPHTHALMOLOGY

## 2022-07-07 PROCEDURE — 92014 COMPRE OPH EXAM EST PT 1/>: CPT | Mod: 25,S$GLB,, | Performed by: OPHTHALMOLOGY

## 2022-07-07 PROCEDURE — 92134 CPTRZ OPH DX IMG PST SGM RTA: CPT | Mod: S$GLB,,, | Performed by: OPHTHALMOLOGY

## 2022-07-07 RX ADMIN — Medication 1.25 MG: at 12:07

## 2022-07-07 NOTE — PROGRESS NOTES
IP Liaison - Final Visit Note    Patient: Myesha Quinones  MRN:  [id number]  Date of Service:  7/7/2022  Completed by:  ORESTES Pederson    Reason for Visit   Patient presents with    IP Liaison Chart Review     Patient discharged from hospital before BANDARW was able to complete follow-up visit.        Patient Summary     Discharge Date: 7/1/2022  Discharge telephone number/address: [phone] / [address] [place] [address]  Follow up provider: Dayton Michael MD / Steffen Osborn MD  Follow up appointments: 7/8/2022 @ 8:20am / 7/12/2022 @ 11:30am  Home Health agency & telephone number: [facility]  DME ordered &  name:   Assigned OPCM RN/SW: n/a  Report sent to follow up team (PCP/OPCM) via in basket message: n/a  Community Resources arranged including agency name & contact info: referred to Complex Case Management (OPCM)      ORESTES Pederson

## 2022-07-07 NOTE — PROGRESS NOTES
"HPI     Diabetic Eye Exam      Additional comments: Patient Myesha "Elva" MAHAD Quinones is an 82   year old female.              Comments     Pt here for 6 week mOCT/DFE OS only/Avastin injection OS only. Pt states   that VA OS>OD is blurry all of the time since last visit. Pt states that   she has a constant floaters superiorly OS that "hangs down" and blocks   central VA OS some. Pt denies any eye pain OU, flashes OU, or floaters OD.    DLS: 05/23/2022 with Dr. Arechiga  S/p Avastin OS: 05/23/2022    Gtts: (+)Redeye gtts PRN OU, mostly after injections        OCT - OD trace parafoveal ME  OS - inferior fibrosis at TORSTEN site    Prior FA - Foveal MA's OS   No NV of NP OU      Lost  to COVID complications 12/20    A/P    1. 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 DME OS  S/p Avastin OS x 5  S/p Ozurdex OS x 5 9/19  S/p Iluvien 9/19    Doing well, will need chronic steroid  - may need additional Iluvien OS soon  Watch OD fornow    Can consider light focal to parafoveal MA's OS if worsens      3. HTN Ret OU  BS/BP/chol control  5/22 TORSTEN OS with SRF and preretinal heme  S/p Avastin OS x 1    Avastin OS today    4. PCIOL OU  With small PCO      6 weeks OCT and dilate OS      Risks, benefits, and alternatives to treatment discussed in detail with the patient.  The patient voiced understanding and wished to proceed with the procedure    Injection Procedure Note:  Diagnosis: TORSTEN with VH and ME OS    Patient Identified and Time Out complete  Pt marked  Topical Proparacaine and Betadine.  Inject Avastin OS at 6:00 @ 3.5-4mm posterior to limbus  Post Operative Dx: Same  Complications: None  Follow up as above.      "

## 2022-07-08 ENCOUNTER — OFFICE VISIT (OUTPATIENT)
Dept: FAMILY MEDICINE | Facility: CLINIC | Age: 82
End: 2022-07-08
Attending: FAMILY MEDICINE
Payer: MEDICARE

## 2022-07-08 ENCOUNTER — HOSPITAL ENCOUNTER (INPATIENT)
Facility: HOSPITAL | Age: 82
LOS: 7 days | Discharge: HOME HEALTH | DRG: 189 | End: 2022-07-15
Attending: EMERGENCY MEDICINE | Admitting: INTERNAL MEDICINE
Payer: MEDICARE

## 2022-07-08 VITALS
HEART RATE: 59 BPM | SYSTOLIC BLOOD PRESSURE: 120 MMHG | OXYGEN SATURATION: 96 % | BODY MASS INDEX: 22.27 KG/M2 | DIASTOLIC BLOOD PRESSURE: 80 MMHG | TEMPERATURE: 98 F | HEIGHT: 63 IN | WEIGHT: 125.69 LBS

## 2022-07-08 DIAGNOSIS — E11.69 HYPERLIPIDEMIA ASSOCIATED WITH TYPE 2 DIABETES MELLITUS: ICD-10-CM

## 2022-07-08 DIAGNOSIS — J90 PLEURAL EFFUSION: ICD-10-CM

## 2022-07-08 DIAGNOSIS — Z79.4 TYPE 2 DIABETES MELLITUS WITH STAGE 3B CHRONIC KIDNEY DISEASE, WITH LONG-TERM CURRENT USE OF INSULIN: ICD-10-CM

## 2022-07-08 DIAGNOSIS — J44.9 CHRONIC OBSTRUCTIVE PULMONARY DISEASE, UNSPECIFIED COPD TYPE: ICD-10-CM

## 2022-07-08 DIAGNOSIS — E11.29 MICROALBUMINURIA DUE TO TYPE 2 DIABETES MELLITUS: ICD-10-CM

## 2022-07-08 DIAGNOSIS — E11.42 DIABETIC POLYNEUROPATHY ASSOCIATED WITH TYPE 2 DIABETES MELLITUS: ICD-10-CM

## 2022-07-08 DIAGNOSIS — J96.01 ACUTE HYPOXEMIC RESPIRATORY FAILURE: ICD-10-CM

## 2022-07-08 DIAGNOSIS — I25.5 CARDIOMYOPATHY, ISCHEMIC: ICD-10-CM

## 2022-07-08 DIAGNOSIS — J18.9 HCAP (HEALTHCARE-ASSOCIATED PNEUMONIA): Primary | ICD-10-CM

## 2022-07-08 DIAGNOSIS — N18.30 ACUTE RENAL FAILURE SUPERIMPOSED ON STAGE 3 CHRONIC KIDNEY DISEASE, UNSPECIFIED ACUTE RENAL FAILURE TYPE, UNSPECIFIED WHETHER STAGE 3A OR 3B CKD: ICD-10-CM

## 2022-07-08 DIAGNOSIS — E11.22 TYPE 2 DIABETES MELLITUS WITH STAGE 3B CHRONIC KIDNEY DISEASE, WITH LONG-TERM CURRENT USE OF INSULIN: ICD-10-CM

## 2022-07-08 DIAGNOSIS — F32.0 CURRENT MILD EPISODE OF MAJOR DEPRESSIVE DISORDER WITHOUT PRIOR EPISODE: ICD-10-CM

## 2022-07-08 DIAGNOSIS — H90.6 MIXED CONDUCTIVE AND SENSORINEURAL HEARING LOSS OF BOTH EARS: ICD-10-CM

## 2022-07-08 DIAGNOSIS — Z09 HOSPITAL DISCHARGE FOLLOW-UP: Primary | ICD-10-CM

## 2022-07-08 DIAGNOSIS — R05.9 COUGH: ICD-10-CM

## 2022-07-08 DIAGNOSIS — N18.32 TYPE 2 DIABETES MELLITUS WITH STAGE 3B CHRONIC KIDNEY DISEASE, WITH LONG-TERM CURRENT USE OF INSULIN: ICD-10-CM
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
## 2022-07-08 DIAGNOSIS — N18.4 CKD (CHRONIC KIDNEY DISEASE) STAGE 4, GFR 15-29 ML/MIN: ICD-10-CM

## 2022-07-08 DIAGNOSIS — N17.9 ACUTE RENAL FAILURE SUPERIMPOSED ON STAGE 3 CHRONIC KIDNEY DISEASE, UNSPECIFIED ACUTE RENAL FAILURE TYPE, UNSPECIFIED WHETHER STAGE 3A OR 3B CKD: ICD-10-CM

## 2022-07-08 DIAGNOSIS — J18.9 HAP (HOSPITAL-ACQUIRED PNEUMONIA): ICD-10-CM

## 2022-07-08 DIAGNOSIS — I50.42 CHRONIC COMBINED SYSTOLIC AND DIASTOLIC HEART FAILURE: ICD-10-CM

## 2022-07-08 DIAGNOSIS — I15.2 HYPERTENSION ASSOCIATED WITH DIABETES: ICD-10-CM

## 2022-07-08 DIAGNOSIS — I50.42 CHRONIC COMBINED SYSTOLIC (CONGESTIVE) AND DIASTOLIC (CONGESTIVE) HEART FAILURE: ICD-10-CM

## 2022-07-08 DIAGNOSIS — J96.01 ACUTE RESPIRATORY FAILURE WITH HYPOXIA: Primary | ICD-10-CM

## 2022-07-08 DIAGNOSIS — E78.5 HYPERLIPIDEMIA ASSOCIATED WITH TYPE 2 DIABETES MELLITUS: ICD-10-CM

## 2022-07-08 DIAGNOSIS — E11.59 HYPERTENSION ASSOCIATED WITH DIABETES: ICD-10-CM

## 2022-07-08 DIAGNOSIS — R80.9 MICROALBUMINURIA DUE TO TYPE 2 DIABETES MELLITUS: ICD-10-CM

## 2022-07-08 PROBLEM — Z71.89 ACP (ADVANCE CARE PLANNING): Status: ACTIVE | Noted: 2022-07-08

## 2022-07-08 LAB
ALBUMIN SERPL BCP-MCNC: 3.1 G/DL (ref 3.5–5.2)
ALLENS TEST: ABNORMAL
ALP SERPL-CCNC: 111 U/L (ref 55–135)
ALT SERPL W/O P-5'-P-CCNC: 14 U/L (ref 10–44)
ANION GAP SERPL CALC-SCNC: 17 MMOL/L (ref 8–16)
AST SERPL-CCNC: 20 U/L (ref 10–40)
BACTERIA #/AREA URNS HPF: ABNORMAL /HPF
BASOPHILS # BLD AUTO: 0.03 K/UL (ref 0–0.2)
BASOPHILS NFR BLD: 0.3 % (ref 0–1.9)
BILIRUB SERPL-MCNC: 0.3 MG/DL (ref 0.1–1)
BILIRUB UR QL STRIP: NEGATIVE
BNP SERPL-MCNC: 439 PG/ML (ref 0–99)
BUN SERPL-MCNC: 86 MG/DL (ref 8–23)
CALCIUM SERPL-MCNC: 8.9 MG/DL (ref 8.7–10.5)
CHLORIDE SERPL-SCNC: 85 MMOL/L (ref 95–110)
CLARITY UR: CLEAR
CO2 SERPL-SCNC: 27 MMOL/L (ref 23–29)
COLOR UR: YELLOW
CREAT SERPL-MCNC: 2.7 MG/DL (ref 0.5–1.4)
CTP QC/QA: YES
DIFFERENTIAL METHOD: ABNORMAL
EOSINOPHIL # BLD AUTO: 0.1 K/UL (ref 0–0.5)
EOSINOPHIL NFR BLD: 0.9 % (ref 0–8)
ERYTHROCYTE [DISTWIDTH] IN BLOOD BY AUTOMATED COUNT: 15.1 % (ref 11.5–14.5)
EST. GFR  (AFRICAN AMERICAN): 18 ML/MIN/1.73 M^2
EST. GFR  (NON AFRICAN AMERICAN): 16 ML/MIN/1.73 M^2
ESTIMATED AVG GLUCOSE: 183 MG/DL (ref 68–131)
GLUCOSE SERPL-MCNC: 240 MG/DL (ref 70–110)
GLUCOSE UR QL STRIP: NEGATIVE
HBA1C MFR BLD: 8 % (ref 4–5.6)
HCO3 UR-SCNC: 31.9 MMOL/L (ref 24–28)
HCT VFR BLD AUTO: 25.7 % (ref 37–48.5)
HGB BLD-MCNC: 8.3 G/DL (ref 12–16)
HGB UR QL STRIP: NEGATIVE
HYALINE CASTS #/AREA URNS LPF: 1 /LPF
IMM GRANULOCYTES # BLD AUTO: 0.12 K/UL (ref 0–0.04)
IMM GRANULOCYTES NFR BLD AUTO: 1.2 % (ref 0–0.5)
KETONES UR QL STRIP: NEGATIVE
LEUKOCYTE ESTERASE UR QL STRIP: ABNORMAL
LYMPHOCYTES # BLD AUTO: 1.5 K/UL (ref 1–4.8)
LYMPHOCYTES NFR BLD: 14.6 % (ref 18–48)
MCH RBC QN AUTO: 28.5 PG (ref 27–31)
MCHC RBC AUTO-ENTMCNC: 32.3 G/DL (ref 32–36)
MCV RBC AUTO: 88 FL (ref 82–98)
MICROSCOPIC COMMENT: ABNORMAL
MONOCYTES # BLD AUTO: 0.6 K/UL (ref 0.3–1)
MONOCYTES NFR BLD: 5.6 % (ref 4–15)
NEUTROPHILS # BLD AUTO: 7.9 K/UL (ref 1.8–7.7)
NEUTROPHILS NFR BLD: 77.4 % (ref 38–73)
NITRITE UR QL STRIP: NEGATIVE
NRBC BLD-RTO: 0 /100 WBC
PCO2 BLDA: 44.2 MMHG (ref 35–45)
PH SMN: 7.47 [PH] (ref 7.35–7.45)
PH UR STRIP: 7 [PH] (ref 5–8)
PLATELET # BLD AUTO: 277 K/UL (ref 150–450)
PMV BLD AUTO: 10.2 FL (ref 9.2–12.9)
PO2 BLDA: 35 MMHG (ref 40–60)
POC BE: 8 MMOL/L
POC SATURATED O2: 71 % (ref 95–100)
POC TCO2: 33 MMOL/L (ref 24–29)
POCT GLUCOSE: 139 MG/DL (ref 70–110)
POCT GLUCOSE: 278 MG/DL (ref 70–110)
POTASSIUM SERPL-SCNC: 3.9 MMOL/L (ref 3.5–5.1)
PROT SERPL-MCNC: 6.8 G/DL (ref 6–8.4)
PROT UR QL STRIP: ABNORMAL
RBC # BLD AUTO: 2.91 M/UL (ref 4–5.4)
RBC #/AREA URNS HPF: 2 /HPF (ref 0–4)
SAMPLE: ABNORMAL
SARS-COV-2 RDRP RESP QL NAA+PROBE: NEGATIVE
SITE: ABNORMAL
SODIUM SERPL-SCNC: 129 MMOL/L (ref 136–145)
SP GR UR STRIP: 1.01 (ref 1–1.03)
TROPONIN I SERPL DL<=0.01 NG/ML-MCNC: 0.02 NG/ML (ref 0–0.03)
TSH SERPL DL<=0.005 MIU/L-ACNC: 0.42 UIU/ML (ref 0.4–4)
URN SPEC COLLECT METH UR: ABNORMAL
UROBILINOGEN UR STRIP-ACNC: NEGATIVE EU/DL
WBC # BLD AUTO: 10.23 K/UL (ref 3.9–12.7)
WBC #/AREA URNS HPF: 5 /HPF (ref 0–5)
YEAST URNS QL MICRO: ABNORMAL

## 2022-07-08 PROCEDURE — 94760 N-INVAS EAR/PLS OXIMETRY 1: CPT

## 2022-07-08 PROCEDURE — 99496 TRANSITIONAL CARE MANAGE SERVICE 7 DAY DISCHARGE: ICD-10-PCS | Mod: S$GLB,,, | Performed by: FAMILY MEDICINE

## 2022-07-08 PROCEDURE — 63600175 PHARM REV CODE 636 W HCPCS: Performed by: INTERNAL MEDICINE

## 2022-07-08 PROCEDURE — 1160F PR REVIEW ALL MEDS BY PRESCRIBER/CLIN PHARMACIST DOCUMENTED: ICD-10-PCS | Mod: CPTII,S$GLB,, | Performed by: FAMILY MEDICINE

## 2022-07-08 PROCEDURE — 83930 ASSAY OF BLOOD OSMOLALITY: CPT | Performed by: INTERNAL MEDICINE

## 2022-07-08 PROCEDURE — 99291 CRITICAL CARE FIRST HOUR: CPT | Mod: 25

## 2022-07-08 PROCEDURE — 83036 HEMOGLOBIN GLYCOSYLATED A1C: CPT | Performed by: EMERGENCY MEDICINE

## 2022-07-08 PROCEDURE — 93010 ELECTROCARDIOGRAM REPORT: CPT | Mod: ,,, | Performed by: INTERNAL MEDICINE

## 2022-07-08 PROCEDURE — 85025 COMPLETE CBC W/AUTO DIFF WBC: CPT | Performed by: EMERGENCY MEDICINE

## 2022-07-08 PROCEDURE — 81000 URINALYSIS NONAUTO W/SCOPE: CPT | Performed by: EMERGENCY MEDICINE

## 2022-07-08 PROCEDURE — 96375 TX/PRO/DX INJ NEW DRUG ADDON: CPT

## 2022-07-08 PROCEDURE — 96365 THER/PROPH/DIAG IV INF INIT: CPT

## 2022-07-08 PROCEDURE — 3074F PR MOST RECENT SYSTOLIC BLOOD PRESSURE < 130 MM HG: ICD-10-PCS | Mod: CPTII,S$GLB,, | Performed by: FAMILY MEDICINE

## 2022-07-08 PROCEDURE — 99999 PR PBB SHADOW E&M-EST. PATIENT-LVL III: ICD-10-PCS | Mod: PBBFAC,,, | Performed by: FAMILY MEDICINE

## 2022-07-08 PROCEDURE — 3074F SYST BP LT 130 MM HG: CPT | Mod: CPTII,S$GLB,, | Performed by: FAMILY MEDICINE

## 2022-07-08 PROCEDURE — 25000003 PHARM REV CODE 250: Performed by: EMERGENCY MEDICINE

## 2022-07-08 PROCEDURE — 27000207 HC ISOLATION

## 2022-07-08 PROCEDURE — 1159F PR MEDICATION LIST DOCUMENTED IN MEDICAL RECORD: ICD-10-PCS | Mod: CPTII,S$GLB,, | Performed by: FAMILY MEDICINE

## 2022-07-08 PROCEDURE — 99496 TRANSJ CARE MGMT HIGH F2F 7D: CPT | Mod: S$GLB,,, | Performed by: FAMILY MEDICINE

## 2022-07-08 PROCEDURE — 25000242 PHARM REV CODE 250 ALT 637 W/ HCPCS: Performed by: INTERNAL MEDICINE

## 2022-07-08 PROCEDURE — 63600175 PHARM REV CODE 636 W HCPCS: Performed by: EMERGENCY MEDICINE

## 2022-07-08 PROCEDURE — 99999 PR PBB SHADOW E&M-EST. PATIENT-LVL III: CPT | Mod: PBBFAC,,, | Performed by: FAMILY MEDICINE

## 2022-07-08 PROCEDURE — 94761 N-INVAS EAR/PLS OXIMETRY MLT: CPT

## 2022-07-08 PROCEDURE — 27000221 HC OXYGEN, UP TO 24 HOURS

## 2022-07-08 PROCEDURE — C9399 UNCLASSIFIED DRUGS OR BIOLOG: HCPCS | Performed by: INTERNAL MEDICINE

## 2022-07-08 PROCEDURE — 36415 COLL VENOUS BLD VENIPUNCTURE: CPT | Performed by: INTERNAL MEDICINE

## 2022-07-08 PROCEDURE — 84443 ASSAY THYROID STIM HORMONE: CPT | Performed by: EMERGENCY MEDICINE

## 2022-07-08 PROCEDURE — U0002 COVID-19 LAB TEST NON-CDC: HCPCS | Performed by: HOSPITALIST

## 2022-07-08 PROCEDURE — 3079F DIAST BP 80-89 MM HG: CPT | Mod: CPTII,S$GLB,, | Performed by: FAMILY MEDICINE

## 2022-07-08 PROCEDURE — 93010 EKG 12-LEAD: ICD-10-PCS | Mod: ,,, | Performed by: INTERNAL MEDICINE

## 2022-07-08 PROCEDURE — 83880 ASSAY OF NATRIURETIC PEPTIDE: CPT | Performed by: EMERGENCY MEDICINE

## 2022-07-08 PROCEDURE — 82803 BLOOD GASES ANY COMBINATION: CPT

## 2022-07-08 PROCEDURE — 99900035 HC TECH TIME PER 15 MIN (STAT)

## 2022-07-08 PROCEDURE — 1159F MED LIST DOCD IN RCRD: CPT | Mod: CPTII,S$GLB,, | Performed by: FAMILY MEDICINE

## 2022-07-08 PROCEDURE — 25000003 PHARM REV CODE 250: Performed by: INTERNAL MEDICINE

## 2022-07-08 PROCEDURE — 84484 ASSAY OF TROPONIN QUANT: CPT | Performed by: EMERGENCY MEDICINE

## 2022-07-08 PROCEDURE — 11000001 HC ACUTE MED/SURG PRIVATE ROOM

## 2022-07-08 PROCEDURE — 84145 PROCALCITONIN (PCT): CPT | Performed by: EMERGENCY MEDICINE

## 2022-07-08 PROCEDURE — 25000003 PHARM REV CODE 250: Performed by: HOSPITALIST

## 2022-07-08 PROCEDURE — 1160F RVW MEDS BY RX/DR IN RCRD: CPT | Mod: CPTII,S$GLB,, | Performed by: FAMILY MEDICINE

## 2022-07-08 PROCEDURE — 3079F PR MOST RECENT DIASTOLIC BLOOD PRESSURE 80-89 MM HG: ICD-10-PCS | Mod: CPTII,S$GLB,, | Performed by: FAMILY MEDICINE

## 2022-07-08 PROCEDURE — 93005 ELECTROCARDIOGRAM TRACING: CPT

## 2022-07-08 PROCEDURE — 94640 AIRWAY INHALATION TREATMENT: CPT

## 2022-07-08 PROCEDURE — 80053 COMPREHEN METABOLIC PANEL: CPT | Performed by: EMERGENCY MEDICINE

## 2022-07-08 RX ORDER — PRAVASTATIN SODIUM 40 MG/1
40 TABLET ORAL DAILY
Status: DISCONTINUED | OUTPATIENT
Start: 2022-07-09 | End: 2022-07-15 | Stop reason: HOSPADM

## 2022-07-08 RX ORDER — IPRATROPIUM BROMIDE AND ALBUTEROL SULFATE 2.5; .5 MG/3ML; MG/3ML
3 SOLUTION RESPIRATORY (INHALATION)
Status: DISCONTINUED | OUTPATIENT
Start: 2022-07-08 | End: 2022-07-15 | Stop reason: HOSPADM

## 2022-07-08 RX ORDER — CARVEDILOL 6.25 MG/1
6.25 TABLET ORAL 2 TIMES DAILY WITH MEALS
Status: DISCONTINUED | OUTPATIENT
Start: 2022-07-08 | End: 2022-07-15 | Stop reason: HOSPADM

## 2022-07-08 RX ORDER — PNV NO.95/FERROUS FUM/FOLIC AC 28MG-0.8MG
100 TABLET ORAL DAILY
COMMUNITY

## 2022-07-08 RX ORDER — IBUPROFEN 200 MG
24 TABLET ORAL
Status: DISCONTINUED | OUTPATIENT
Start: 2022-07-08 | End: 2022-07-15 | Stop reason: HOSPADM

## 2022-07-08 RX ORDER — BENZONATATE 100 MG/1
100 CAPSULE ORAL 3 TIMES DAILY PRN
Status: DISCONTINUED | OUTPATIENT
Start: 2022-07-08 | End: 2022-07-15 | Stop reason: HOSPADM

## 2022-07-08 RX ORDER — IBUPROFEN 200 MG
16 TABLET ORAL
Status: DISCONTINUED | OUTPATIENT
Start: 2022-07-08 | End: 2022-07-15 | Stop reason: HOSPADM

## 2022-07-08 RX ORDER — INSULIN ASPART 100 [IU]/ML
0-5 INJECTION, SOLUTION INTRAVENOUS; SUBCUTANEOUS
Status: DISCONTINUED | OUTPATIENT
Start: 2022-07-08 | End: 2022-07-08 | Stop reason: SDUPTHER

## 2022-07-08 RX ORDER — IBUPROFEN 200 MG
24 TABLET ORAL
Status: DISCONTINUED | OUTPATIENT
Start: 2022-07-08 | End: 2022-07-08 | Stop reason: SDUPTHER

## 2022-07-08 RX ORDER — POLYETHYLENE GLYCOL 3350 17 G/17G
17 POWDER, FOR SOLUTION ORAL 3 TIMES DAILY PRN
Status: DISCONTINUED | OUTPATIENT
Start: 2022-07-08 | End: 2022-07-09

## 2022-07-08 RX ORDER — ALBUTEROL SULFATE 90 UG/1
2 AEROSOL, METERED RESPIRATORY (INHALATION) EVERY 6 HOURS PRN
Status: DISCONTINUED | OUTPATIENT
Start: 2022-07-08 | End: 2022-07-15 | Stop reason: HOSPADM

## 2022-07-08 RX ORDER — BENZONATATE 100 MG/1
100 CAPSULE ORAL 3 TIMES DAILY PRN
Qty: 20 CAPSULE | Refills: 0 | Status: SHIPPED | OUTPATIENT
Start: 2022-07-08 | End: 2023-01-10

## 2022-07-08 RX ORDER — BISACODYL 10 MG
10 SUPPOSITORY, RECTAL RECTAL DAILY PRN
Status: DISCONTINUED | OUTPATIENT
Start: 2022-07-08 | End: 2022-07-15 | Stop reason: HOSPADM

## 2022-07-08 RX ORDER — CEFEPIME HYDROCHLORIDE 1 G/50ML
2 INJECTION, SOLUTION INTRAVENOUS
Status: DISCONTINUED | OUTPATIENT
Start: 2022-07-08 | End: 2022-07-14

## 2022-07-08 RX ORDER — MUPIROCIN 20 MG/G
OINTMENT TOPICAL 2 TIMES DAILY
Status: COMPLETED | OUTPATIENT
Start: 2022-07-08 | End: 2022-07-13

## 2022-07-08 RX ORDER — GLUCAGON 1 MG
1 KIT INJECTION
Status: DISCONTINUED | OUTPATIENT
Start: 2022-07-08 | End: 2022-07-15 | Stop reason: HOSPADM

## 2022-07-08 RX ORDER — SODIUM CHLORIDE 0.9 % (FLUSH) 0.9 %
10 SYRINGE (ML) INJECTION EVERY 12 HOURS PRN
Status: DISCONTINUED | OUTPATIENT
Start: 2022-07-08 | End: 2022-07-15 | Stop reason: HOSPADM

## 2022-07-08 RX ORDER — PROCHLORPERAZINE EDISYLATE 5 MG/ML
5 INJECTION INTRAMUSCULAR; INTRAVENOUS EVERY 6 HOURS PRN
Status: DISCONTINUED | OUTPATIENT
Start: 2022-07-08 | End: 2022-07-15 | Stop reason: HOSPADM

## 2022-07-08 RX ORDER — NAPROXEN SODIUM 220 MG/1
81 TABLET, FILM COATED ORAL DAILY
Status: DISCONTINUED | OUTPATIENT
Start: 2022-07-09 | End: 2022-07-15 | Stop reason: HOSPADM

## 2022-07-08 RX ORDER — VANCOMYCIN HCL IN 5 % DEXTROSE 1G/250ML
1000 PLASTIC BAG, INJECTION (ML) INTRAVENOUS
Status: DISCONTINUED | OUTPATIENT
Start: 2022-07-08 | End: 2022-07-08

## 2022-07-08 RX ORDER — SIMETHICONE 80 MG
1 TABLET,CHEWABLE ORAL 4 TIMES DAILY PRN
Status: DISCONTINUED | OUTPATIENT
Start: 2022-07-08 | End: 2022-07-15 | Stop reason: HOSPADM

## 2022-07-08 RX ORDER — ACETAMINOPHEN 500 MG
500 TABLET ORAL
COMMUNITY
End: 2024-03-21

## 2022-07-08 RX ORDER — INSULIN ASPART 100 [IU]/ML
1-10 INJECTION, SOLUTION INTRAVENOUS; SUBCUTANEOUS
Status: DISCONTINUED | OUTPATIENT
Start: 2022-07-08 | End: 2022-07-15 | Stop reason: HOSPADM

## 2022-07-08 RX ORDER — L. ACIDOPHILUS/L.BULGARICUS 100MM CELL
1 GRANULES IN PACKET (EA) ORAL 2 TIMES DAILY
Status: DISCONTINUED | OUTPATIENT
Start: 2022-07-08 | End: 2022-07-15 | Stop reason: HOSPADM

## 2022-07-08 RX ORDER — FLUTICASONE FUROATE AND VILANTEROL 100; 25 UG/1; UG/1
1 POWDER RESPIRATORY (INHALATION) DAILY
Status: DISCONTINUED | OUTPATIENT
Start: 2022-07-09 | End: 2022-07-15 | Stop reason: HOSPADM

## 2022-07-08 RX ORDER — AZITHROMYCIN 250 MG/1
250 TABLET, FILM COATED ORAL DAILY
Status: COMPLETED | OUTPATIENT
Start: 2022-07-09 | End: 2022-07-13

## 2022-07-08 RX ORDER — NAPROXEN SODIUM 220 MG/1
81 TABLET, FILM COATED ORAL DAILY
COMMUNITY

## 2022-07-08 RX ORDER — TALC
6 POWDER (GRAM) TOPICAL NIGHTLY PRN
Status: DISCONTINUED | OUTPATIENT
Start: 2022-07-08 | End: 2022-07-15 | Stop reason: HOSPADM

## 2022-07-08 RX ORDER — NALOXONE HCL 0.4 MG/ML
0.02 VIAL (ML) INJECTION
Status: DISCONTINUED | OUTPATIENT
Start: 2022-07-08 | End: 2022-07-15 | Stop reason: HOSPADM

## 2022-07-08 RX ORDER — MONTELUKAST SODIUM 10 MG/1
10 TABLET ORAL NIGHTLY
Status: DISCONTINUED | OUTPATIENT
Start: 2022-07-08 | End: 2022-07-15 | Stop reason: HOSPADM

## 2022-07-08 RX ORDER — GLUCAGON 1 MG
1 KIT INJECTION
Status: DISCONTINUED | OUTPATIENT
Start: 2022-07-08 | End: 2022-07-08 | Stop reason: SDUPTHER

## 2022-07-08 RX ORDER — ACETAMINOPHEN 325 MG/1
650 TABLET ORAL EVERY 8 HOURS PRN
Status: DISCONTINUED | OUTPATIENT
Start: 2022-07-08 | End: 2022-07-15 | Stop reason: HOSPADM

## 2022-07-08 RX ORDER — HYDRALAZINE HYDROCHLORIDE 25 MG/1
100 TABLET, FILM COATED ORAL 2 TIMES DAILY
Status: DISCONTINUED | OUTPATIENT
Start: 2022-07-08 | End: 2022-07-15 | Stop reason: HOSPADM

## 2022-07-08 RX ORDER — HYDROCODONE BITARTRATE AND ACETAMINOPHEN 5; 325 MG/1; MG/1
1 TABLET ORAL EVERY 6 HOURS PRN
Status: DISCONTINUED | OUTPATIENT
Start: 2022-07-08 | End: 2022-07-15 | Stop reason: HOSPADM

## 2022-07-08 RX ORDER — FLUTICASONE PROPIONATE 50 MCG
2 SPRAY, SUSPENSION (ML) NASAL DAILY
Status: DISCONTINUED | OUTPATIENT
Start: 2022-07-09 | End: 2022-07-15 | Stop reason: HOSPADM

## 2022-07-08 RX ORDER — NITROGLYCERIN 0.4 MG/1
0.4 TABLET SUBLINGUAL EVERY 5 MIN PRN
Status: DISCONTINUED | OUTPATIENT
Start: 2022-07-08 | End: 2022-07-15 | Stop reason: HOSPADM

## 2022-07-08 RX ORDER — ONDANSETRON 2 MG/ML
4 INJECTION INTRAMUSCULAR; INTRAVENOUS EVERY 8 HOURS PRN
Status: DISCONTINUED | OUTPATIENT
Start: 2022-07-08 | End: 2022-07-15 | Stop reason: HOSPADM

## 2022-07-08 RX ORDER — DEXTROMETHORPHAN POLISTIREX 30 MG/5 ML
1 SUSPENSION, EXTENDED RELEASE 12 HR ORAL DAILY PRN
Status: DISCONTINUED | OUTPATIENT
Start: 2022-07-08 | End: 2022-07-15 | Stop reason: HOSPADM

## 2022-07-08 RX ORDER — IBUPROFEN 200 MG
16 TABLET ORAL
Status: DISCONTINUED | OUTPATIENT
Start: 2022-07-08 | End: 2022-07-08 | Stop reason: SDUPTHER

## 2022-07-08 RX ADMIN — INSULIN DETEMIR 20 UNITS: 100 INJECTION, SOLUTION SUBCUTANEOUS at 10:07

## 2022-07-08 RX ADMIN — BENZONATATE 100 MG: 100 CAPSULE ORAL at 11:07

## 2022-07-08 RX ADMIN — CEFTRIAXONE SODIUM 1 G: 1 INJECTION, POWDER, FOR SOLUTION INTRAMUSCULAR; INTRAVENOUS at 07:07

## 2022-07-08 RX ADMIN — MUPIROCIN: 20 OINTMENT TOPICAL at 10:07

## 2022-07-08 RX ADMIN — CARVEDILOL 6.25 MG: 6.25 TABLET, FILM COATED ORAL at 10:07

## 2022-07-08 RX ADMIN — AZITHROMYCIN MONOHYDRATE 500 MG: 500 INJECTION, POWDER, LYOPHILIZED, FOR SOLUTION INTRAVENOUS at 07:07

## 2022-07-08 RX ADMIN — LACTOBACILLUS ACIDOPHILUS / LACTOBACILLUS BULGARICUS 1 EACH: 100 MILLION CFU STRENGTH GRANULES at 10:07

## 2022-07-08 RX ADMIN — HYDRALAZINE HYDROCHLORIDE 100 MG: 25 TABLET, FILM COATED ORAL at 10:07

## 2022-07-08 RX ADMIN — MONTELUKAST 10 MG: 10 TABLET, FILM COATED ORAL at 10:07

## 2022-07-08 RX ADMIN — VANCOMYCIN HYDROCHLORIDE 1250 MG: 1.25 INJECTION, POWDER, LYOPHILIZED, FOR SOLUTION INTRAVENOUS at 11:07

## 2022-07-08 RX ADMIN — CEFEPIME 2 G: 2 INJECTION, POWDER, FOR SOLUTION INTRAVENOUS at 10:07

## 2022-07-08 RX ADMIN — Medication 6 MG: at 10:07

## 2022-07-08 RX ADMIN — METHYLPREDNISOLONE SODIUM SUCCINATE 40 MG: 40 INJECTION, POWDER, FOR SOLUTION INTRAMUSCULAR; INTRAVENOUS at 08:07

## 2022-07-08 RX ADMIN — IPRATROPIUM BROMIDE AND ALBUTEROL SULFATE 3 ML: .5; 3 SOLUTION RESPIRATORY (INHALATION) at 08:07

## 2022-07-08 NOTE — ED PROVIDER NOTES
Encounter Date: 7/8/2022    SCRIBE #1 NOTE: I, Chano Ferreira, am scribing for, and in the presence of,  Ari Kelly MD. I have scribed the following portions of the note - Other sections scribed: HPI, ROS, PE.       History     Chief Complaint   Patient presents with    Fatigue     Worsening generalized weakness for 2 days. Accucheck-353 per ems.      This is a 82 y.o. 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, with long-term current use of insulin (02/22/2018), COPD (chronic obstructive pulmonary disease), COPD exacerbation (04/08/2018), Current mild episode of major depressive disorder without prior episode (01/25/2022), Defibrillator discharge, Diabetes mellitus, Diabetes mellitus type II, Diabetes with neurologic complications, Edema, Goiter, Hematuria, unspecified, breast cancer, Hyperlipidemia, Hypertension, Hypocalcemia, Hypokalemia, Hyponatremia, Hyponatremia (4/2/2022), Iron deficiency anemia (05/16/2017), Iron deficiency anemia, Left kidney mass, Meningioma, Microalbuminuria due to type 2 diabetes mellitus (01/26/2022), Osteoporosis, postmenopausal, Pneumonia (12/08/2019), 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     The patient presents to the Emergency Department via ambulance with weakness.   Symptoms are associated with loss of appetite, shortness of breath, congestion, cough, and constipation.  Pt denies fever, chills, vomiting, chest pain, or nausea.   Patient has no prior history of similar symptoms.   Patient's daughter denies use of home oxygen.  Patient visited Cong Michael MD today for a follow up after being discharged from the hospital, with no unusual findings.  Patient then took a nap, and began noticing symptoms upon waking up.   Patient took some Metamucil and ate mixed greens, pudding, and water.  Patient's daughter notes that they measured her blood pressure at home, which was low.  Patient's daughter notes that her blood sugar level was measured at home at approximately 300 mmol/L.  Unknown O2 sat prior to arrival, however patient 84% on 2 L nasal cannula.      The history is provided by the patient and a relative. No  was used.     Review of patient's allergies indicates:   Allergen Reactions    Iodine and iodide containing products Hives    Nifedipine      weakness     Past Medical History:   Diagnosis Date    Acute hypoxemic respiratory failure 12/19/2019    Acute right-sided thoracic back pain 12/09/2019    Allergy     Asthma     Basal cell carcinoma     left forehead    Basal cell carcinoma     left nose    Basal cell carcinoma 05/20/2015    right nose    Basal cell carcinoma 12/22/2015    left lower post neck    Basal cell carcinoma 12/03/2019    left ant scalp     Bilateral renal cysts     Breast cancer     CAD (coronary artery disease)     Cardiomyopathy     Cardiomyopathy, ischemic     Cataract     CHF (congestive heart failure)     CKD (chronic kidney disease) stage 4, GFR 15-29 ml/min     Colon polyp 2011    Controlled type 2 diabetes mellitus with both eyes  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 macular edema, with long-term current use of insulin 02/22/2018    COPD (chronic obstructive pulmonary disease)     COPD exacerbation 04/08/2018    Current mild episode of major depressive disorder without prior episode 01/25/2022    Defibrillator discharge     Diabetes mellitus     Diabetes mellitus type II     Diabetes with neurologic complications     Edema     Goiter     MNG    Hematuria, unspecified     HX: breast cancer     Hyperlipidemia     Hypertension     Hypocalcemia     Hypokalemia     Hyponatremia     Hyponatremia 04/02/2022    Iron deficiency anemia 05/16/2017    Iron deficiency anemia     Left kidney mass     Meningioma     Microalbuminuria due to type 2 diabetes mellitus 01/26/2022    Osteoporosis, postmenopausal     Pneumonia 12/08/2019    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    Proteinuria 01/21/2019    Pseudomonas pneumonia     Skin cancer     s/p excision    Sleep apnea     CPAP    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/2015    mid forehead    Unspecified vitamin D deficiency     UTI (urinary tract infection) 04/02/2022    Ventricular tachycardia     Vitamin B12 deficiency     Vitamin D deficiency disease      Past Surgical History:   Procedure Laterality Date    BASAL CELL CARCINOMA EXCISION      posterior neck and nose    BREAST BIOPSY      BREAST CYST EXCISION Left     BREAST SURGERY      CARDIAC DEFIBRILLATOR PLACEMENT      x 2    CATARACT EXTRACTION W/  INTRAOCULAR LENS IMPLANT Bilateral     CHOLECYSTECTOMY      COLONOSCOPY N/A 11/5/2019    Procedure: COLONOSCOPY;  Surgeon: Boaz Botello MD;  [facility] ([address] [place] [address]);  Service: Endoscopy;  Laterality: N/A;  AICD - [employer] -     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    FRACTURE SURGERY      left elbow and wrist as a child    HYSTERECTOMY      MASTECTOMY Right     REPLACEMENT OF IMPLANTABLE CARDIOVERTER-DEFIBRILLATOR (ICD)  GENERATOR N/A 12/17/2018    Procedure: REPLACEMENT, ICD GENERATOR;  Surgeon: Jan Mckeon MD;  [facility];  Service: Cardiology;  Laterality: N/A;  DONNA, CRT-D gen change, MDT, MAC, SK, 3 Prep    REVISION OF SKIN POCKET FOR CARDIOVERTER-DEFIBRILLATOR  12/17/2018    Procedure: Revision, Skin Pocket, For Cardioverter-Defibrillator;  Surgeon: Jan Mckeon MD;  Location: [facility];  Service: Cardiology;;    SQUAMOUS CELL CARCINOMA EXCISION      remved from forehead    TONSILLECTOMY       Family History   Problem Relation Age of Onset    Diabetes Father     Heart disease Father     Diabetes Sister     Heart disease Sister     Diabetes Brother     Heart disease Brother     Hypertension Brother     Diabetes Brother     Heart disease Brother     Hypertension Brother     Diabetes Brother     Heart disease Brother     Cancer Brother         colon    Diabetes Brother     Cancer Son         skin    Diabetes Son         prediabetes    Diabetes Daughter         prediabetes    Cancer Daughter         melanoma    Obesity Daughter     Melanoma Daughter     Diabetes Son     Asthma Mother     Hypertension Mother     Stroke Mother     No Known Problems Maternal Grandmother     No Known Problems Maternal Grandfather     No Known Problems Paternal Grandmother     No Known Problems Paternal Grandfather     Amblyopia Neg Hx     Blindness Neg Hx     Cataracts Neg Hx     Glaucoma Neg Hx     Macular degeneration Neg Hx     Retinal detachment Neg Hx     Strabismus Neg Hx     Thyroid disease Neg Hx      Social History     Tobacco Use    Smoking status: Never Smoker    Smokeless tobacco: Never Used   Substance Use Topics    Alcohol use: No     Alcohol/week: 0.0 standard drinks    Drug use: No     Review of Systems   Constitutional: Positive for appetite change. Negative for chills.   HENT: Positive for congestion.    Respiratory: Positive for cough and shortness of breath.     Cardiovascular: Negative for chest pain.   Gastrointestinal: Positive for constipation. Negative for nausea and vomiting.   Neurological: Positive for weakness.   All other systems reviewed and are negative.      Physical Exam     Initial Vitals [07/08/22 1623]   BP Pulse Resp Temp SpO2   (!) 146/67 60 (!) 22 97.7 °F (36.5 °C) (!) 92 %      MAP       --         Physical Exam    Nursing note and vitals reviewed.  Constitutional: She appears well-developed and well-nourished. She is not diaphoretic. No distress.   HENT:   Head: Normocephalic and atraumatic.   Mouth/Throat: Oropharynx is clear and moist. No oropharyngeal exudate.   Eyes: Conjunctivae are normal. Pupils are equal, round, and reactive to light. Right eye exhibits no discharge. Left eye exhibits no discharge. No scleral icterus.   Neck: Neck supple.   Normal range of motion.  Cardiovascular: Normal rate, regular rhythm and intact distal pulses. Exam reveals no gallop and no friction rub.    Faint murmur. Heart sounds distant.   Pulmonary/Chest: She has no wheezes. She has no rhonchi.   Diminished left sided breath sounds. Rales to the right base.   Abdominal: Abdomen is soft. Bowel sounds are normal. She exhibits no distension. There is no abdominal tenderness.   Musculoskeletal:         General: No tenderness or edema. Normal range of motion.      Cervical back: Normal range of motion and neck supple.     Lymphadenopathy:     She has no cervical adenopathy.   Neurological: She is alert and oriented to person, place, and time. She has normal strength. GCS score is 15. GCS eye subscore is 4. GCS verbal subscore is 5. GCS motor subscore is 6.   Skin: Skin is warm and dry. Capillary refill takes less than 2 seconds. No rash noted. No erythema.   Psychiatric: She has a normal mood and affect. Thought content normal.         ED Course   Procedures  Labs Reviewed   CBC W/ AUTO DIFFERENTIAL - Abnormal; Notable for the following components:       Result Value     RBC 2.91 (*)     Hemoglobin 8.3 (*)     Hematocrit 25.7 (*)     RDW 15.1 (*)     Immature Granulocytes 1.2 (*)     Gran # (ANC) 7.9 (*)     Immature Grans (Abs) 0.12 (*)     Gran % 77.4 (*)     Lymph % 14.6 (*)     All other components within normal limits   COMPREHENSIVE METABOLIC PANEL - Abnormal; Notable for the following components:    Sodium 129 (*)     Chloride 85 (*)     Glucose 240 (*)     BUN 86 (*)     Creatinine 2.7 (*)     Albumin 3.1 (*)     Anion Gap 17 (*)     eGFR if  18 (*)     eGFR if non  16 (*)     All other components within normal limits   B-TYPE NATRIURETIC PEPTIDE - Abnormal; Notable for the following components:     (*)     All other components within normal limits   URINALYSIS, REFLEX TO URINE CULTURE - Abnormal; Notable for the following components:    Protein, UA 2+ (*)     Leukocytes, UA 1+ (*)     All other components within normal limits    Narrative:     Specimen Source->Urine   URINALYSIS MICROSCOPIC - Abnormal; Notable for the following components:    Yeast, UA Rare (*)     All other components within normal limits    Narrative:     Specimen Source->Urine   POCT GLUCOSE - Abnormal; Notable for the following components:    POCT Glucose 278 (*)     All other components within normal limits   ISTAT PROCEDURE - Abnormal; Notable for the following components:    POC PH 7.466 (*)     POC PO2 35 (*)     POC HCO3 31.9 (*)     POC SATURATED O2 71 (*)     POC TCO2 33 (*)     All other components within normal limits   SARS-COV-2 RDRP GENE - Normal   CULTURE, RESPIRATORY   TROPONIN I   PROCALCITONIN   TSH   HEMOGLOBIN A1C   URINALYSIS, REFLEX TO URINE CULTURE   LEGIONELLA ANTIGEN, URINE RANDOM   OSMOLALITY, URINE RANDOM   SODIUM, URINE, RANDOM   PROTEIN / CREATININE RATIO, URINE   POCT GLUCOSE, HAND-HELD DEVICE   POCT GLUCOSE MONITORING CONTINUOUS   POCT GLUCOSE MONITORING CONTINUOUS          Imaging Results          X-Ray Chest AP Portable (Final  result)  Result time 07/08/22 17:51:00    Final result by Nathanael Moralez MD (07/08/22 17:51:00)                 Impression:      1. Interval development of patchy consolidation within the left mid and lower lung zones concerning for infection, correlation and follow-up is advised.  Findings are superimposed upon chronic interstitial attenuation, possibly reflecting underlying edema.      Electronically signed by: Nathanael Moralez MD  Date:    07/08/2022  Time:    17:51             Narrative:    EXAMINATION:  XR CHEST AP PORTABLE    CLINICAL HISTORY:  Cough, unspecified    TECHNIQUE:  Single frontal view of the chest was performed.    COMPARISON:  06/29/2022    FINDINGS:  Left chest wall pacer noted.  The cardiomediastinal silhouette is prominent, similar to the previous examination noting calcification of the aorta.  Patient is rotated..  There is no pleural effusion.  The trachea is midline.  The lungs are symmetrically expanded bilaterally with coarse interstitial attenuation, similar to the previous exam.  There is patchy consolidation projected over the left mid to lower lung zone, new since the previous exam..  There is no pneumothorax.  The osseous structures are remarkable for degenerative changes and osteopenia..                                 Medications   vancomycin 1.25 g in dextrose 5% 250 mL IVPB (ready to mix) (has no administration in time range)   methylPREDNISolone sodium succinate injection 40 mg (40 mg Intravenous Given 7/8/22 2030)   albuterol-ipratropium 2.5 mg-0.5 mg/3 mL nebulizer solution 3 mL (3 mLs Nebulization Given 7/8/22 2017)   albuterol inhaler 2 puff (has no administration in time range)   aspirin chewable tablet 81 mg (has no administration in time range)   benzonatate capsule 100 mg (has no administration in time range)   carvediloL tablet 6.25 mg (6.25 mg Oral Given 7/8/22 2214)   fluticasone propionate 50 mcg/actuation nasal spray 100 mcg (has no administration in time range)    fluticasone furoate-vilanteroL 100-25 mcg/dose diskus inhaler 1 puff (has no administration in time range)   hydrALAZINE tablet 100 mg (100 mg Oral Given 7/8/22 2214)   montelukast tablet 10 mg (10 mg Oral Given 7/8/22 2215)   nitroGLYCERIN SL tablet 0.4 mg (has no administration in time range)   pravastatin tablet 40 mg (has no administration in time range)   sodium chloride 0.9% flush 10 mL (has no administration in time range)   polyethylene glycol packet 17 g (has no administration in time range)   bisacodyL suppository 10 mg (has no administration in time range)   mineral oil enema 1 enema (has no administration in time range)   acetaminophen tablet 650 mg (has no administration in time range)   HYDROcodone-acetaminophen 5-325 mg per tablet 1 tablet (has no administration in time range)   naloxone 0.4 mg/mL injection 0.02 mg (has no administration in time range)   ondansetron injection 4 mg (has no administration in time range)   prochlorperazine injection Soln 5 mg (has no administration in time range)   insulin aspart U-100 pen 1-10 Units (has no administration in time range)   glucose chewable tablet 16 g (has no administration in time range)   glucose chewable tablet 24 g (has no administration in time range)   dextrose 10% bolus 125 mL (has no administration in time range)   dextrose 10% bolus 250 mL (has no administration in time range)   glucagon (human recombinant) injection 1 mg (has no administration in time range)   melatonin tablet 6 mg (6 mg Oral Given 7/8/22 2228)   simethicone chewable tablet 80 mg (has no administration in time range)   lactobacillus acidophilus & bulgar 100 million cell packet 1 each (1 each Oral Given 7/8/22 2235)   cefepime in dextrose 5 % IVPB 2 g (2 g Intravenous New Bag 7/8/22 2244)   azithromycin tablet 250 mg (has no administration in time range)   vancomycin - pharmacy to dose (has no administration in time range)   insulin detemir U-100 pen 20 Units (20 Units  Subcutaneous Given 7/8/22 2248)   mupirocin 2 % ointment ( Nasal Given 7/8/22 2215)   cefTRIAXone (ROCEPHIN) 1 g/50 mL D5W IVPB (0 g Intravenous Stopped 7/8/22 1938)   azithromycin 500 mg in dextrose 5 % 250 mL IVPB (ready to mix system) (0 mg Intravenous Stopped 7/8/22 2044)     Medical Decision Making:   History:   Old Medical Records: I decided to obtain old medical records.  Old Records Summarized: records from clinic visits.       <> Summary of Records: Echo from 6/20/22 shows EF at 55%. LV normal in size with concentric hypertrophy. Grade II diastolic dysfunction of the LV. RV normal.    Pacemaker/ICD was checked on 6/20/22. No erythema detected.  Clinical Tests:   Lab Tests: Ordered and Reviewed          Scribe Attestation:   Scribe #1: I performed the above scribed service and the documentation accurately describes the services I performed. I attest to the accuracy of the note.        ED Course as of 07/08/22 2310 Fri Jul 08, 2022 1712 I, Dr. Ari Kelly, personally performed the services described in this documentation. All medical record entries made by the scribe were at my direction and in my presence. I have reviewed the chart and agree that the record is accurate and complete.   Ari Kelly MD.   [NP]   1713 This is an emergent evaluation of a 82 y.o.female patient with presentation of weakness and hyperglycemia.  Patient recently had admission for COVID requiring oxygen supplementation, IV steroids and antivirals.  History includes diabetes, CKD, CHF, cardiomyopathy with ICD/pacemaker.    Initial differentials include but are not limited to: pneumonia, CHF, fluid overload, pleural effusion, acute on chronic kidney disease, UTI, hyperglycemia, electrolyte abnormality, DKA, hyperosmolar nonketotic hyperglycemia.  Patient arrived hypoxemic with O2 sat 84% on 2 L, increased to 4 L and O2 sats however ring around 92%.    Plan: cbc, cmp, troponin, BNP, fingerstick glucose, UA, ABG, chest x-ray.   [NP]       ED Course User Index  [NP] Ari Kelly MD           Patient has Left Sided pneumonia.  Will treat like Hcap.  Updated patient and family.  Discussed with Hospital Medicine.    Attending Critical Care:   Critical Care Times:   ==============================================================  · Total Critical Care Time - exclusive of procedural time: 30 minutes.  ==============================================================  Critical Care Condition:  Potentially life-threatening   Critical Care Comments: Critical Care time was inclusive of direct patient care, review of previous records, interpretation of labs, imaging and ekg, as well as discussion of my impression and plan of care with the patient, family and other clinicians/consultants. This time is exclusive of any separate billable procedures and of treating other patients. Critical care was required for this patient who presented with acute respiratory failure with hypoxia, healthcare associated pneumonia, requiring my emergent evaluation and management in the emergency department.           Clinical Impression:   Final diagnoses:  [R05.9] Cough  [J18.9] HCAP (healthcare-associated pneumonia) (Primary)  [J96.01] Acute hypoxemic respiratory failure  [N17.9, N18.30] Acute renal failure superimposed on stage 3 chronic kidney disease, unspecified acute renal failure type, unspecified whether stage 3a or 3b CKD          ED Disposition Condition    Admit               Ari Kelly MD  07/08/22 8798

## 2022-07-08 NOTE — PROGRESS NOTES
Transitional Care Note  Subjective:      Patient ID: Myesha Quinones is a 82 y.o. female.  Chief Complaint: No chief complaint on file.    Family and/or Caretaker present at visit?  Yes.  Diagnostic tests reviewed/disposition: I have reviewed all completed as well as pending diagnostic tests at the time of discharge.  Disease/illness education: YES  Home health/community services discussion/referrals: Patient does not have home health established from hospital visit.  They do not need home health.  If needed, we will set up home health for the patient.   Establishment or re-establishment of referral orders for community resources: No other necessary community resources.   Discussion with other health care providers: No discussion with other health care providers necessary.   82 YR OLD PLEASANT FEMALE WITH DM II, CKD III, CHF W/GERALDINE, COPD, DEPRESSION AND OTHER CO MORBIDITIES PRESENTS TODAY AS NEW PATIENT AND ESTABLISHING PRIMARY CARE AND ALSO HOSPITAL DISCHARGE FOLLOW UP. SHE WAS ADMITTED 2 WEEKS AGO ON 6/20/22 AT [facility] FOR COVID AND RESPIRATORY INSUFFICIENCY. SHE STAYED FOR 9 DAYS AND RECEIVED ANTI VIRAL AND STEROIDS. NO ACUTE EVENTS THROUGHOUT HER STAY. SHE WAS DISCHARGED ON 07/01/22 IN STABLE CONDITION. SHE FEELS FINE AND NO ISSUES. HER OXYGEN LEVELS ARE NORMAL.    DM II - CONTROLLED D- RECENT A1C WENT UP SINCE SHE TOOK STEROIDS IN HOSPITAL - BUT SHE FOLLOWING ENDO AND ON INSULIN      CHF WITH NORMAL EF - FOLLOWS CARDIOLOGY - ON ASA    HTN - CONTROLLED    CKD III - FOLLOWS NEPHROLOGY - NO NEW ISSUES       HISTORY AS BELOW - REVIEWED IN DETAIL        Diabetes  She presents for her follow-up diabetic visit. She has type 2 diabetes mellitus. Her disease course has been stable. There are no hypoglycemic associated symptoms. Pertinent negatives for hypoglycemia include no confusion, dizziness, headaches, nervousness/anxiousness, pallor, seizures, speech difficulty or tremors. Associated symptoms include fatigue  and weakness. Pertinent negatives for diabetes include no chest pain, no polydipsia and no polyuria. There are no hypoglycemic complications. Symptoms are stable. Diabetic complications include heart disease. Risk factors for coronary artery disease include diabetes mellitus, hypertension, post-menopausal and sedentary lifestyle. Current diabetic treatment includes insulin injections and oral agent (monotherapy). She is compliant with treatment all of the time. She rarely participates in exercise. An ACE inhibitor/angiotensin II receptor blocker is being taken. She does not see a podiatrist.Eye exam is current.     Review of Systems   Constitutional: Positive for fatigue. Negative for activity change, diaphoresis and unexpected weight change.   HENT: Negative.  Negative for congestion, ear discharge, hearing loss, rhinorrhea, sore throat and voice change.    Eyes: Negative.  Negative for pain, discharge and visual disturbance.   Respiratory: Negative.  Negative for chest tightness, shortness of breath and wheezing.    Cardiovascular: Negative.  Negative for chest pain.   Gastrointestinal: Negative.  Negative for abdominal distention, anal bleeding, constipation and nausea.   Endocrine: Negative.  Negative for cold intolerance, polydipsia and polyuria.   Genitourinary: Negative.  Negative for decreased urine volume, difficulty urinating, dysuria, frequency, menstrual problem and vaginal pain.   Musculoskeletal: Negative.  Negative for arthralgias, gait problem and myalgias.   Skin: Negative.  Negative for color change, pallor and wound.   Allergic/Immunologic: Negative.  Negative for environmental allergies and immunocompromised state.   Neurological: Positive for weakness. Negative for dizziness, tremors, seizures, speech difficulty and headaches.   Hematological: Negative.  Negative for adenopathy. Does not bruise/bleed easily.   Psychiatric/Behavioral: Negative.  Negative for agitation, confusion, decreased  concentration, hallucinations, self-injury and suicidal ideas. The patient is not nervous/anxious.          PMH/PSH/FH/SH/MED/ALLERGY reviewed    Objective:       Vitals:    07/08/22 0824   BP: 120/80   Pulse: (!) 59   Temp: 98 °F (36.7 °C)       Physical Exam  Constitutional:       General: She is not in acute distress.     Appearance: She is well-developed. She is not diaphoretic.   HENT:      Head: Normocephalic and atraumatic.      Right Ear: External ear normal.      Left Ear: External ear normal.      Nose: Nose normal.      Mouth/Throat:      Pharynx: No oropharyngeal exudate.   Eyes:      General: No scleral icterus.        Right eye: No discharge.         Left eye: No discharge.      Conjunctiva/sclera: Conjunctivae normal.      Pupils: Pupils are equal, round, and reactive to light.   Neck:      Thyroid: No thyromegaly.      Vascular: No JVD.      Trachea: No tracheal deviation.   Cardiovascular:      Rate and Rhythm: Normal rate and regular rhythm.      Heart sounds: Normal heart sounds. No murmur heard.    No friction rub. No gallop.   Pulmonary:      Effort: Pulmonary effort is normal.      Breath sounds: Normal breath sounds. No stridor. No wheezing or rales.   Chest:      Chest wall: No tenderness.   Abdominal:      General: Bowel sounds are normal. There is no distension.      Palpations: Abdomen is soft. There is no mass.      Tenderness: There is no abdominal tenderness. There is no guarding or rebound.      Hernia: No hernia is present.   Musculoskeletal:         General: No tenderness. Normal range of motion.      Cervical back: Normal range of motion and neck supple.   Lymphadenopathy:      Cervical: No cervical adenopathy.   Skin:     General: Skin is warm and dry.      Coloration: Skin is not pale.      Findings: No erythema or rash.   Neurological:      Mental Status: She is alert and oriented to person, place, and time.      Cranial Nerves: No cranial nerve deficit.      Motor: No abnormal  muscle tone.      Coordination: Coordination normal.      Deep Tendon Reflexes: Reflexes are normal and symmetric. Reflexes normal.   Psychiatric:         Behavior: Behavior normal.         Thought Content: Thought content normal.         Judgment: Judgment normal.         Assessment:       1. Hospital discharge follow-up    2. Type 2 diabetes mellitus with stage 3b chronic kidney disease, with long-term current use of insulin    3. Microalbuminuria due to type 2 diabetes mellitus    4. Current mild episode of major depressive disorder without prior episode    5. Chronic combined systolic and diastolic heart failure    6. Hyperlipidemia associated with type 2 diabetes mellitus    7. Mixed conductive and sensorineural hearing loss of both ears    8. Hypertension associated with diabetes    9. Chronic obstructive pulmonary disease, unspecified COPD type    10. Diabetic polyneuropathy associated with type 2 diabetes mellitus    11. Cough        Plan:             Diagnoses and all orders for this visit:    Hospital discharge follow-up    Type 2 diabetes mellitus with stage 3b chronic kidney disease, with long-term current use of insulin    Microalbuminuria due to type 2 diabetes mellitus    Current mild episode of major depressive disorder without prior episode    Chronic combined systolic and diastolic heart failure    Hyperlipidemia associated with type 2 diabetes mellitus    Mixed conductive and sensorineural hearing loss of both ears    Hypertension associated with diabetes    Chronic obstructive pulmonary disease, unspecified COPD type    Diabetic polyneuropathy associated with type 2 diabetes mellitus    Cough  -     benzonatate (TESSALON) 100 MG capsule; Take 1 capsule (100 mg total) by mouth 3 (three) times daily as needed for Cough.      Hospital discharge follow up  -doing well  -no new issues    DM II  -controlled    HTN  -controlled    cough  -tessalon perles  prn    Depression  -controlled    COPD  -stable    Spent adequate time in obtaining history and explaining differentials    40 minutes spent during this visit of which greater than 50% devoted to face-face counseling and coordination of care regarding diagnosis and management plan    Follow up in about 3 months (around 10/8/2022), or if symptoms worsen or fail to improve.

## 2022-07-08 NOTE — Clinical Note
Diagnosis: Acute hypoxemic respiratory failure [[id number]]   Future Attending Provider: TUYET MATHEW [[id number]]   Admitting Provider:: REVA PATEL [[id number]]   Special Needs:: Fall Risk [15]   The patient is a 19y Female complaining of heat cramps/exhaustion/stroke.

## 2022-07-09 LAB
ANION GAP SERPL CALC-SCNC: 15 MMOL/L (ref 8–16)
BACTERIA #/AREA URNS HPF: ABNORMAL /HPF
BASOPHILS # BLD AUTO: 0.03 K/UL (ref 0–0.2)
BASOPHILS NFR BLD: 0.3 % (ref 0–1.9)
BILIRUB UR QL STRIP: NEGATIVE
BUN SERPL-MCNC: 78 MG/DL (ref 8–23)
CALCIUM SERPL-MCNC: 9.1 MG/DL (ref 8.7–10.5)
CHLORIDE SERPL-SCNC: 88 MMOL/L (ref 95–110)
CLARITY UR: CLEAR
CO2 SERPL-SCNC: 25 MMOL/L (ref 23–29)
COLOR UR: COLORLESS
CREAT SERPL-MCNC: 2.5 MG/DL (ref 0.5–1.4)
CREAT UR-MCNC: 22.5 MG/DL (ref 15–325)
DIFFERENTIAL METHOD: ABNORMAL
EOSINOPHIL # BLD AUTO: 0 K/UL (ref 0–0.5)
EOSINOPHIL NFR BLD: 0 % (ref 0–8)
ERYTHROCYTE [DISTWIDTH] IN BLOOD BY AUTOMATED COUNT: 14.9 % (ref 11.5–14.5)
EST. GFR  (AFRICAN AMERICAN): 20 ML/MIN/1.73 M^2
EST. GFR  (NON AFRICAN AMERICAN): 17 ML/MIN/1.73 M^2
GLUCOSE SERPL-MCNC: 215 MG/DL (ref 70–110)
GLUCOSE UR QL STRIP: NEGATIVE
HCT VFR BLD AUTO: 26.1 % (ref 37–48.5)
HGB BLD-MCNC: 8.6 G/DL (ref 12–16)
HGB UR QL STRIP: NEGATIVE
HYALINE CASTS #/AREA URNS LPF: 0 /LPF
IMM GRANULOCYTES # BLD AUTO: 0.11 K/UL (ref 0–0.04)
IMM GRANULOCYTES NFR BLD AUTO: 1.1 % (ref 0–0.5)
KETONES UR QL STRIP: NEGATIVE
LEUKOCYTE ESTERASE UR QL STRIP: ABNORMAL
LYMPHOCYTES # BLD AUTO: 0.8 K/UL (ref 1–4.8)
LYMPHOCYTES NFR BLD: 7.2 % (ref 18–48)
MAGNESIUM SERPL-MCNC: 2.5 MG/DL (ref 1.6–2.6)
MCH RBC QN AUTO: 28.5 PG (ref 27–31)
MCHC RBC AUTO-ENTMCNC: 33 G/DL (ref 32–36)
MCV RBC AUTO: 86 FL (ref 82–98)
MICROSCOPIC COMMENT: ABNORMAL
MONOCYTES # BLD AUTO: 0.1 K/UL (ref 0.3–1)
MONOCYTES NFR BLD: 0.6 % (ref 4–15)
NEUTROPHILS # BLD AUTO: 9.4 K/UL (ref 1.8–7.7)
NEUTROPHILS NFR BLD: 90.8 % (ref 38–73)
NITRITE UR QL STRIP: NEGATIVE
NRBC BLD-RTO: 0 /100 WBC
OSMOLALITY SERPL: 301 MOSM/KG (ref 275–295)
OSMOLALITY UR: 213 MOSM/KG (ref 50–1200)
PH UR STRIP: 7 [PH] (ref 5–8)
PHOSPHATE SERPL-MCNC: 4.5 MG/DL (ref 2.7–4.5)
PLATELET # BLD AUTO: 312 K/UL (ref 150–450)
PMV BLD AUTO: 10.2 FL (ref 9.2–12.9)
POCT GLUCOSE: 216 MG/DL (ref 70–110)
POCT GLUCOSE: 231 MG/DL (ref 70–110)
POCT GLUCOSE: 293 MG/DL (ref 70–110)
POCT GLUCOSE: 352 MG/DL (ref 70–110)
POTASSIUM SERPL-SCNC: 4 MMOL/L (ref 3.5–5.1)
PROCALCITONIN SERPL IA-MCNC: 0.04 NG/ML
PROCALCITONIN SERPL IA-MCNC: 0.05 NG/ML
PROT UR QL STRIP: ABNORMAL
PROT UR-MCNC: 146 MG/DL (ref 0–15)
PROT/CREAT UR: 6.49 MG/G{CREAT} (ref 0–0.2)
RBC # BLD AUTO: 3.02 M/UL (ref 4–5.4)
RBC #/AREA URNS HPF: 1 /HPF (ref 0–4)
SODIUM SERPL-SCNC: 128 MMOL/L (ref 136–145)
SODIUM UR-SCNC: 28 MMOL/L (ref 20–250)
SP GR UR STRIP: 1.01 (ref 1–1.03)
URN SPEC COLLECT METH UR: ABNORMAL
UROBILINOGEN UR STRIP-ACNC: NEGATIVE EU/DL
VANCOMYCIN SERPL-MCNC: 15.8 UG/ML
WBC # BLD AUTO: 10.38 K/UL (ref 3.9–12.7)
WBC #/AREA URNS HPF: 3 /HPF (ref 0–5)
YEAST URNS QL MICRO: ABNORMAL

## 2022-07-09 PROCEDURE — 25000242 PHARM REV CODE 250 ALT 637 W/ HCPCS: Performed by: INTERNAL MEDICINE

## 2022-07-09 PROCEDURE — 25000003 PHARM REV CODE 250: Performed by: HOSPITALIST

## 2022-07-09 PROCEDURE — 87305 ASPERGILLUS AG IA: CPT | Performed by: STUDENT IN AN ORGANIZED HEALTH CARE EDUCATION/TRAINING PROGRAM

## 2022-07-09 PROCEDURE — 84300 ASSAY OF URINE SODIUM: CPT | Performed by: INTERNAL MEDICINE

## 2022-07-09 PROCEDURE — 36415 COLL VENOUS BLD VENIPUNCTURE: CPT | Performed by: HOSPITALIST

## 2022-07-09 PROCEDURE — 84100 ASSAY OF PHOSPHORUS: CPT | Performed by: INTERNAL MEDICINE

## 2022-07-09 PROCEDURE — 87385 HISTOPLASMA CAPSUL AG IA: CPT | Performed by: STUDENT IN AN ORGANIZED HEALTH CARE EDUCATION/TRAINING PROGRAM

## 2022-07-09 PROCEDURE — 63600175 PHARM REV CODE 636 W HCPCS: Performed by: INTERNAL MEDICINE

## 2022-07-09 PROCEDURE — 63600175 PHARM REV CODE 636 W HCPCS: Performed by: NURSE PRACTITIONER

## 2022-07-09 PROCEDURE — 83735 ASSAY OF MAGNESIUM: CPT | Performed by: INTERNAL MEDICINE

## 2022-07-09 PROCEDURE — 81000 URINALYSIS NONAUTO W/SCOPE: CPT | Performed by: INTERNAL MEDICINE

## 2022-07-09 PROCEDURE — 36415 COLL VENOUS BLD VENIPUNCTURE: CPT | Performed by: STUDENT IN AN ORGANIZED HEALTH CARE EDUCATION/TRAINING PROGRAM

## 2022-07-09 PROCEDURE — 25000003 PHARM REV CODE 250: Performed by: NURSE PRACTITIONER

## 2022-07-09 PROCEDURE — 94640 AIRWAY INHALATION TREATMENT: CPT

## 2022-07-09 PROCEDURE — 94761 N-INVAS EAR/PLS OXIMETRY MLT: CPT

## 2022-07-09 PROCEDURE — 87077 CULTURE AEROBIC IDENTIFY: CPT | Performed by: INTERNAL MEDICINE

## 2022-07-09 PROCEDURE — 36415 COLL VENOUS BLD VENIPUNCTURE: CPT | Performed by: NURSE PRACTITIONER

## 2022-07-09 PROCEDURE — 97116 GAIT TRAINING THERAPY: CPT

## 2022-07-09 PROCEDURE — 25000242 PHARM REV CODE 250 ALT 637 W/ HCPCS: Performed by: NURSE PRACTITIONER

## 2022-07-09 PROCEDURE — 63700000 PHARM REV CODE 250 ALT 637 W/O HCPCS: Performed by: INTERNAL MEDICINE

## 2022-07-09 PROCEDURE — 87070 CULTURE OTHR SPECIMN AEROBIC: CPT | Performed by: INTERNAL MEDICINE

## 2022-07-09 PROCEDURE — 83935 ASSAY OF URINE OSMOLALITY: CPT | Performed by: INTERNAL MEDICINE

## 2022-07-09 PROCEDURE — 82570 ASSAY OF URINE CREATININE: CPT | Performed by: INTERNAL MEDICINE

## 2022-07-09 PROCEDURE — 87186 SC STD MICRODIL/AGAR DIL: CPT | Performed by: INTERNAL MEDICINE

## 2022-07-09 PROCEDURE — 11000001 HC ACUTE MED/SURG PRIVATE ROOM

## 2022-07-09 PROCEDURE — 97530 THERAPEUTIC ACTIVITIES: CPT

## 2022-07-09 PROCEDURE — 87449 NOS EACH ORGANISM AG IA: CPT | Mod: 91 | Performed by: STUDENT IN AN ORGANIZED HEALTH CARE EDUCATION/TRAINING PROGRAM

## 2022-07-09 PROCEDURE — 80202 ASSAY OF VANCOMYCIN: CPT | Performed by: HOSPITALIST

## 2022-07-09 PROCEDURE — 87040 BLOOD CULTURE FOR BACTERIA: CPT | Performed by: NURSE PRACTITIONER

## 2022-07-09 PROCEDURE — C9399 UNCLASSIFIED DRUGS OR BIOLOG: HCPCS | Performed by: INTERNAL MEDICINE

## 2022-07-09 PROCEDURE — 99900035 HC TECH TIME PER 15 MIN (STAT)

## 2022-07-09 PROCEDURE — 25000003 PHARM REV CODE 250: Performed by: INTERNAL MEDICINE

## 2022-07-09 PROCEDURE — 87205 SMEAR GRAM STAIN: CPT | Performed by: INTERNAL MEDICINE

## 2022-07-09 PROCEDURE — 87449 NOS EACH ORGANISM AG IA: CPT | Performed by: INTERNAL MEDICINE

## 2022-07-09 PROCEDURE — 27000221 HC OXYGEN, UP TO 24 HOURS

## 2022-07-09 PROCEDURE — 97161 PT EVAL LOW COMPLEX 20 MIN: CPT

## 2022-07-09 PROCEDURE — 84145 PROCALCITONIN (PCT): CPT | Performed by: INTERNAL MEDICINE

## 2022-07-09 PROCEDURE — 85025 COMPLETE CBC W/AUTO DIFF WBC: CPT | Performed by: INTERNAL MEDICINE

## 2022-07-09 PROCEDURE — 36415 COLL VENOUS BLD VENIPUNCTURE: CPT | Performed by: INTERNAL MEDICINE

## 2022-07-09 PROCEDURE — 80048 BASIC METABOLIC PNL TOTAL CA: CPT | Performed by: INTERNAL MEDICINE

## 2022-07-09 RX ORDER — FUROSEMIDE 40 MG/1
40 TABLET ORAL 2 TIMES DAILY
Status: DISCONTINUED | OUTPATIENT
Start: 2022-07-10 | End: 2022-07-10

## 2022-07-09 RX ORDER — SODIUM CHLORIDE FOR INHALATION 3 %
4 VIAL, NEBULIZER (ML) INHALATION ONCE
Status: COMPLETED | OUTPATIENT
Start: 2022-07-09 | End: 2022-07-09

## 2022-07-09 RX ORDER — VANCOMYCIN HCL IN 5 % DEXTROSE 1G/250ML
1000 PLASTIC BAG, INJECTION (ML) INTRAVENOUS ONCE
Status: COMPLETED | OUTPATIENT
Start: 2022-07-10 | End: 2022-07-10

## 2022-07-09 RX ORDER — DIPHENHYDRAMINE HYDROCHLORIDE 50 MG/ML
25 INJECTION INTRAMUSCULAR; INTRAVENOUS ONCE
Status: DISCONTINUED | OUTPATIENT
Start: 2022-07-09 | End: 2022-07-10

## 2022-07-09 RX ORDER — DIPHENHYDRAMINE HCL 25 MG
25 CAPSULE ORAL EVERY 6 HOURS PRN
Status: DISCONTINUED | OUTPATIENT
Start: 2022-07-09 | End: 2022-07-15 | Stop reason: HOSPADM

## 2022-07-09 RX ORDER — POLYETHYLENE GLYCOL 3350 17 G/17G
17 POWDER, FOR SOLUTION ORAL 2 TIMES DAILY
Status: DISCONTINUED | OUTPATIENT
Start: 2022-07-09 | End: 2022-07-15 | Stop reason: HOSPADM

## 2022-07-09 RX ORDER — HEPARIN SODIUM 5000 [USP'U]/ML
5000 INJECTION, SOLUTION INTRAVENOUS; SUBCUTANEOUS EVERY 8 HOURS
Status: DISCONTINUED | OUTPATIENT
Start: 2022-07-09 | End: 2022-07-10

## 2022-07-09 RX ADMIN — MUPIROCIN: 20 OINTMENT TOPICAL at 09:07

## 2022-07-09 RX ADMIN — IPRATROPIUM BROMIDE AND ALBUTEROL SULFATE 3 ML: .5; 3 SOLUTION RESPIRATORY (INHALATION) at 07:07

## 2022-07-09 RX ADMIN — SODIUM CHLORIDE 30 MG/ML INHALATION SOLUTION 4 ML: 30 SOLUTION INHALANT at 01:07

## 2022-07-09 RX ADMIN — PRAVASTATIN SODIUM 40 MG: 40 TABLET ORAL at 09:07

## 2022-07-09 RX ADMIN — IPRATROPIUM BROMIDE AND ALBUTEROL SULFATE 3 ML: .5; 3 SOLUTION RESPIRATORY (INHALATION) at 01:07

## 2022-07-09 RX ADMIN — FLUTICASONE FUROATE AND VILANTEROL TRIFENATATE 1 PUFF: 100; 25 POWDER RESPIRATORY (INHALATION) at 09:07

## 2022-07-09 RX ADMIN — MONTELUKAST 10 MG: 10 TABLET, FILM COATED ORAL at 08:07

## 2022-07-09 RX ADMIN — LACTOBACILLUS ACIDOPHILUS / LACTOBACILLUS BULGARICUS 1 EACH: 100 MILLION CFU STRENGTH GRANULES at 08:07

## 2022-07-09 RX ADMIN — ASPIRIN 81 MG: 81 TABLET, CHEWABLE ORAL at 09:07

## 2022-07-09 RX ADMIN — ACETAMINOPHEN 650 MG: 325 TABLET ORAL at 05:07

## 2022-07-09 RX ADMIN — METHYLPREDNISOLONE SODIUM SUCCINATE 40 MG: 40 INJECTION, POWDER, FOR SOLUTION INTRAMUSCULAR; INTRAVENOUS at 05:07

## 2022-07-09 RX ADMIN — CARVEDILOL 6.25 MG: 6.25 TABLET, FILM COATED ORAL at 05:07

## 2022-07-09 RX ADMIN — HYDRALAZINE HYDROCHLORIDE 100 MG: 25 TABLET, FILM COATED ORAL at 09:07

## 2022-07-09 RX ADMIN — AZITHROMYCIN MONOHYDRATE 250 MG: 250 TABLET ORAL at 09:07

## 2022-07-09 RX ADMIN — LACTOBACILLUS ACIDOPHILUS / LACTOBACILLUS BULGARICUS 1 EACH: 100 MILLION CFU STRENGTH GRANULES at 09:07

## 2022-07-09 RX ADMIN — VANCOMYCIN HYDROCHLORIDE 1000 MG: 1 INJECTION, POWDER, LYOPHILIZED, FOR SOLUTION INTRAVENOUS at 11:07

## 2022-07-09 RX ADMIN — POLYETHYLENE GLYCOL 3350 17 G: 17 POWDER, FOR SOLUTION ORAL at 02:07

## 2022-07-09 RX ADMIN — HEPARIN SODIUM 5000 UNITS: 5000 INJECTION, SOLUTION INTRAVENOUS; SUBCUTANEOUS at 02:07

## 2022-07-09 RX ADMIN — INSULIN DETEMIR 20 UNITS: 100 INJECTION, SOLUTION SUBCUTANEOUS at 08:07

## 2022-07-09 RX ADMIN — INSULIN DETEMIR 20 UNITS: 100 INJECTION, SOLUTION SUBCUTANEOUS at 09:07

## 2022-07-09 RX ADMIN — POLYETHYLENE GLYCOL 3350 17 G: 17 POWDER, FOR SOLUTION ORAL at 08:07

## 2022-07-09 RX ADMIN — MUPIROCIN: 20 OINTMENT TOPICAL at 08:07

## 2022-07-09 RX ADMIN — INSULIN ASPART 5 UNITS: 100 INJECTION, SOLUTION INTRAVENOUS; SUBCUTANEOUS at 08:07

## 2022-07-09 RX ADMIN — Medication 6 MG: at 08:07

## 2022-07-09 RX ADMIN — HEPARIN SODIUM 5000 UNITS: 5000 INJECTION, SOLUTION INTRAVENOUS; SUBCUTANEOUS at 08:07

## 2022-07-09 RX ADMIN — CARVEDILOL 6.25 MG: 6.25 TABLET, FILM COATED ORAL at 09:07

## 2022-07-09 RX ADMIN — CEFEPIME 2 G: 2 INJECTION, POWDER, FOR SOLUTION INTRAVENOUS at 08:07

## 2022-07-09 RX ADMIN — METHYLPREDNISOLONE SODIUM SUCCINATE 40 MG: 40 INJECTION, POWDER, FOR SOLUTION INTRAMUSCULAR; INTRAVENOUS at 12:07

## 2022-07-09 RX ADMIN — HYDRALAZINE HYDROCHLORIDE 100 MG: 25 TABLET, FILM COATED ORAL at 08:07

## 2022-07-09 RX ADMIN — FLUTICASONE PROPIONATE 100 MCG: 50 SPRAY, METERED NASAL at 09:07

## 2022-07-09 NOTE — ASSESSMENT & PLAN NOTE
Chronic, controlled.  Latest blood pressure and vitals reviewed-   Temp:  [96.1 °F (35.6 °C)-98.8 °F (37.1 °C)]   Pulse:  [60-80]   Resp:  [18-26]   BP: (124-162)/(58-72)   SpO2:  [90 %-95 %] .   Home meds for hypertension were reviewed and noted below. Hospital anti-hypertensive changes were made as shown below.  Hypertension Medications             carvediloL (COREG) 25 MG tablet TAKE 2 TABLETS (50 MG TOTAL) BY MOUTH 2 (TWO) TIMES DAILY.    furosemide (LASIX) 40 MG tablet Take 1 tablet (40 mg total) by mouth every 8 (eight) hours as needed (swelling).    hydrALAZINE (APRESOLINE) 100 MG tablet Take 1 tablet (100 mg total) by mouth 2 (two) times daily.    nitroGLYCERIN (NITROSTAT) 0.4 MG SL tablet Place 0.4 mg under the tongue every 5 (five) minutes as needed for Chest pain.     valsartan (DIOVAN) 80 MG tablet Take 1 tablet (80 mg total) by mouth 2 (two) times daily.        Will utilize p.r.n. blood pressure medication only if patient's blood pressure greater than  180/110 and she develops symptoms such as worsening chest pain or shortness of breath.    -Continues to Hydralazine, Carvedilol  - Hold valsartan due to elevated Cr. Will adjust as needed

## 2022-07-09 NOTE — ASSESSMENT & PLAN NOTE
Patient with hx urinary retention s/p pessary. Was discharged home with De La Cruz on previous admission    - Continue De La Cruz on admit  - UA on admission no infection

## 2022-07-09 NOTE — ASSESSMENT & PLAN NOTE
Patient is chronically on statin.will continue for now. Monitor clinically. Last LDL was   Lab Results   Component Value Date    LDLCALC 29.8 (L) 04/03/2022

## 2022-07-09 NOTE — PLAN OF CARE
" Patient came from ED @ 2100. Oriented the patient to the unit and the nurse call light system. Vital signs recorded as Temp: 97.4, HR:76, Resp: 22, SpO2: 92% and BP: 152/70. Plan of care reviewed with the patient. Scheduled medicines given and the patient tolerated well. Given Tylenol 650 mg for headache 3/10, Melatonin 6 mg for insomnia and Benzonatate 100 mg for cough. Patient developed mild itching after initiating Vancomycin IV, stopped and informed NP, Ms. Viry Knox, later patient wanted me to start the IV again and received uneventfully.Fall and safety precautions taken and the standard interventions are in place. On Telemetry monitoring with Paced rhythm, no true "red alarms" noted in the shift. No acute distress reported either in the shift.  Patient is on 4 L Oxygen through Nasal cannula saturating @ 92%.Advised the patient to call for the assistance. Continued monitoring the patient.  "

## 2022-07-09 NOTE — ASSESSMENT & PLAN NOTE
Renal fxn at baseline, Scr slightly up but delta Cr unchanged  No nsaids or cox2 (-)  Repeat chem

## 2022-07-09 NOTE — PROGRESS NOTES
[facility] Medicine  Progress Note    Patient Name: Myesha Quinones  MRN: [id number]  Patient Class: IP- Inpatient   Admission Date: 7/8/2022  Length of Stay: 1 days  Attending Physician: Caroline Anderson MD  Primary Care Provider: Dayton Michael MD        Subjective:     Principal Problem:Acute hypoxemic respiratory failure        HPI:  82F recently DC after being hospitalized for acute hypoxic respiratory failure due to COVID 19 PNA. Was brought to the ER with c/o several days of increasing weakness and lethargy. In the ER she was hypoxic on RA in the 80s which improved to 92-93 on NC. She was in no respiratory distress. Her CXR showed B/L infiltrates. She was given BSA Abx in the ER and  consulted for admission. DW ER MD.       Overview/Hospital Course:  No notes on file    Interval History: Reports feels fatigue and PELAYO. Denies any chest pain or SOB at rest. On 4L NC. On Cefepime/Azithromycin/Vancomycin. Cultures pending. Pulmonary consulted.    Review of Systems   Constitutional:  Positive for appetite change and fatigue. Negative for chills and fever.   HENT:  Negative for sore throat and trouble swallowing.    Respiratory:  Positive for cough and shortness of breath. Negative for wheezing.    Cardiovascular:  Negative for chest pain and leg swelling.   Gastrointestinal:  Positive for constipation. Negative for abdominal pain.   Genitourinary:  Negative for dysuria and hematuria.        With De La Cruz   Musculoskeletal:  Positive for gait problem.   Skin:  Negative for rash.   Neurological:  Positive for weakness. Negative for dizziness and headaches.   Psychiatric/Behavioral:  Negative for confusion.    Objective:     Vital Signs (Most Recent):  Temp: 97 °F (36.1 °C) (07/09/22 1045)  Pulse: 64 (07/09/22 1200)  Resp: 18 (07/09/22 1045)  BP: 137/66 (07/09/22 1045)  SpO2: (!) 94 % (07/09/22 1045)   Vital Signs (24h Range):  Temp:  [96.1 °F (35.6 °C)-98.8 °F (37.1 °C)] 97 °F (36.1 °C)  Pulse:   [60-80] 64  Resp:  [18-26] 18  SpO2:  [90 %-95 %] 94 %  BP: (124-162)/(58-72) 137/66     Weight: 59.5 kg (131 lb 2.8 oz)  Body mass index is 23.24 kg/m².    Intake/Output Summary (Last 24 hours) at 7/9/2022 1237  Last data filed at 7/9/2022 0800  Gross per 24 hour   Intake 185 ml   Output 1250 ml   Net -1065 ml      Physical Exam  Constitutional:       General: She is not in acute distress.     Appearance: She is not diaphoretic.   HENT:      Head: Normocephalic and atraumatic.      Nose: No congestion or rhinorrhea.      Mouth/Throat:      Mouth: Mucous membranes are moist.   Eyes:      Conjunctiva/sclera: Conjunctivae normal.   Cardiovascular:      Rate and Rhythm: Normal rate.      Heart sounds: No murmur heard.  Pulmonary:      Breath sounds: Decreased breath sounds (Left side) present. No wheezing or rales.   Chest:      Chest wall: No tenderness.   Abdominal:      General: Bowel sounds are normal.      Palpations: Abdomen is soft.      Tenderness: There is no abdominal tenderness. There is no right CVA tenderness, left CVA tenderness or guarding.   Musculoskeletal:      Cervical back: Neck supple.      Right lower leg: Edema (trace) present.      Left lower leg: Edema (trace) present.   Skin:     Findings: Bruising (BUE) present.   Neurological:      Mental Status: She is alert and oriented to person, place, and time.      Motor: Weakness present.   Psychiatric:         Mood and Affect: Mood normal.         Thought Content: Thought content normal.       Significant Labs: All pertinent labs within the past 24 hours have been reviewed.    Significant Imaging: I have reviewed all pertinent imaging results/findings within the past 24 hours.      Assessment/Plan:      * Acute hypoxemic respiratory failure  See HAP  Will wean O2 as tolerated  Monitor vol status  Repeat CXR in AM - worsening CXR  Scheduled nebs q6hA  Added steroids - Steroids discontinued due to no wheezing/SOB on exam and hx uncontrolled blood  glucose on steroids   ?covid hauler       HAP (hospital-acquired pneumonia)  BSA (Cefepime and Vancomycin)  Added Azithro to cover atypicals  If MRSA screen (-) can DC Vanc  RC  Trend PCT  Added steroids for Rx of inPt PNA with hypoxia. Steroids discontinued  Consult Pulmonary due to worsening CXR     CKD (chronic kidney disease) stage 4, GFR 15-29 ml/min  Renal fxn at baseline, Scr slightly up but delta Cr unchanged  No nsaids or cox2 (-)  Repeat chem        ACP (advance care planning)  DNI per Pt and dtr  Ok for chest compressions       Type 2 diabetes mellitus, with long-term current use of insulin  Basal + ISS  She's on steroids and her dtr stated that the last time she was on steroids she glu readings > 800.   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 Amaya; was placed on Voriconazole therapy but unable to tolerate  - With recent pseudomonas in sputum. Completed treated with Cefepime for 5 days on the previous admission  - Is admitted for pneumonia with worsening CXR  - Consult Pulmonary    Hyponatremia  Likely 2/2 lung pathology --> ?SIADH. Avoid isotonic saline  Min fluid restriction.   Ustmony Ashraf    Monitor vol status       Urinary retention  Patient with hx urinary retention s/p pessary. Was discharged home with De La Cruz on previous admission    - Continue De La Cruz on admit  - UA on admission no infection      Chronic combined systolic and diastolic heart failure    Patient is identified as having Combined Systolic and Diastolic heart failure that is Chronic. CHF is currently controlled. Latest ECHO performed and demonstrates- Results for orders placed during the hospital encounter of 06/20/22    Echo 6/1/22    Interpretation Summary  · The left ventricle is normal in size with concentric hypertrophy and normal systolic function.  · The estimated ejection fraction is 55%.  · Grade II  left ventricular diastolic dysfunction.  · Normal right ventricular size with normal right ventricular systolic function.  · Severe left atrial enlargement.  · Mild mitral regurgitation.  · Mild to moderate tricuspid regurgitation.  · Intermediate central venous pressure (8 mmHg).  · The estimated PA systolic pressure is 56 mmHg.  · There is pulmonary hypertension.  · There is a left pleural effusion.  . Continue Furosemide and monitor clinical status closely. Monitor on telemetry. Patient is off CHF pathway.  Monitor strict Is&Os and daily weights.  Place on fluid restriction of 1.5 L. Continue to stress to patient importance of self efficacy and  on diet for CHF. Last BNP reviewed- and noted below   Recent Labs   Lab 07/08/22  1735   *         Intake/Output Summary (Last 24 hours) at 7/9/2022 1336  Last data filed at 7/9/2022 0800  Gross per 24 hour   Intake 185 ml   Output 1250 ml   Net -1065 ml         Hyperlipidemia associated with type 2 diabetes mellitus   Patient is chronically on statin.will continue for now. Monitor clinically. Last LDL was   Lab Results   Component Value Date    LDLCALC 29.8 (L) 04/03/2022          Weakness  PTOTSW  Fall precautions       Hypertension associated with diabetes    Chronic, controlled.  Latest blood pressure and vitals reviewed-   Temp:  [96.1 °F (35.6 °C)-98.8 °F (37.1 °C)]   Pulse:  [60-80]   Resp:  [18-26]   BP: (124-162)/(58-72)   SpO2:  [90 %-95 %] .   Home meds for hypertension were reviewed and noted below. Hospital anti-hypertensive changes were made as shown below.  Hypertension Medications             carvediloL (COREG) 25 MG tablet TAKE 2 TABLETS (50 MG TOTAL) BY MOUTH 2 (TWO) TIMES DAILY.    furosemide (LASIX) 40 MG tablet Take 1 tablet (40 mg total) by mouth every 8 (eight) hours as needed (swelling).    hydrALAZINE (APRESOLINE) 100 MG tablet Take 1 tablet (100 mg total) by mouth 2 (two) times daily.    nitroGLYCERIN (NITROSTAT) 0.4 MG SL tablet  Place 0.4 mg under the tongue every 5 (five) minutes as needed for Chest pain.     valsartan (DIOVAN) 80 MG tablet Take 1 tablet (80 mg total) by mouth 2 (two) times daily.        Will utilize p.r.n. blood pressure medication only if patient's blood pressure greater than  180/110 and she develops symptoms such as worsening chest pain or shortness of breath.    -Continues to Hydralazine, Carvedilol  - Hold valsartan due to elevated Cr. Will adjust as needed    Biventricular ICD (implantable cardioverter-defibrillator) in place  Noted. Chronic. No reported issues.        VTE Risk Mitigation (From admission, onward)         Ordered     heparin (porcine) injection 5,000 Units  Every 8 hours         07/09/22 1348     IP VTE HIGH RISK PATIENT  Once         07/08/22 1949     Place sequential compression device  Until discontinued         07/08/22 1949                Discharge Planning   ALIX:      Code Status: Partial Code   Is the patient medically ready for discharge?:     Reason for patient still in hospital (select all that apply): Patient trending condition, Treatment and Consult recommendations                     Malissa Gray NP  Department of Hospital Medicine   [facility]

## 2022-07-09 NOTE — HPI
82F recently DC after being hospitalized for acute hypoxic respiratory failure due to COVID 19 PNA. Was brought to the ER with c/o several days of increasing weakness and lethargy. In the ER she was hypoxic on RA in the 80s which improved to 92-93 on NC. She was in no respiratory distress. Her CXR showed B/L infiltrates. She was given BSA Abx in the ER and  consulted for admission. SUNG ER MD.

## 2022-07-09 NOTE — SUBJECTIVE & OBJECTIVE
Past Medical History:   Diagnosis Date    Acute hypoxemic respiratory failure 12/19/2019    Acute right-sided thoracic back pain 12/09/2019    Allergy     Asthma     Basal cell carcinoma     left forehead    Basal cell carcinoma     left nose    Basal cell carcinoma 05/20/2015    right nose    Basal cell carcinoma 12/22/2015    left lower post neck    Basal cell carcinoma 12/03/2019    left ant scalp     Bilateral renal cysts     Breast cancer     CAD (coronary artery disease)     Cardiomyopathy     Cardiomyopathy, ischemic     Cataract     CHF (congestive heart failure)     CKD (chronic kidney disease) stage 4, GFR 15-29 ml/min     Colon polyp 2011    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, with long-term current use of insulin 02/22/2018    COPD (chronic obstructive pulmonary disease)     COPD exacerbation 04/08/2018    Current mild episode of major depressive disorder without prior episode 01/25/2022    Defibrillator discharge     Diabetes mellitus     Diabetes mellitus type II     Diabetes with neurologic complications     Edema     Goiter     MNG    Hematuria, unspecified     HX: breast cancer     Hyperlipidemia     Hypertension     Hypocalcemia     Hypokalemia     Hyponatremia     Hyponatremia 4/2/2022    Iron deficiency anemia 05/16/2017    Iron deficiency anemia     Left kidney mass     Meningioma     Microalbuminuria due to type 2 diabetes mellitus 01/26/2022    Osteoporosis, postmenopausal     Pneumonia 12/08/2019    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    Proteinuria 01/21/2019    Pseudomonas pneumonia     Skin cancer     s/p excision    Sleep apnea     CPAP    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/2015    mid forehead    Unspecified vitamin D deficiency     UTI (urinary tract infection) 04/02/2022    Ventricular tachycardia     Vitamin B12 deficiency     Vitamin D deficiency disease        Past Surgical History:   Procedure  Laterality Date    BASAL CELL CARCINOMA EXCISION      posterior neck and nose    BREAST BIOPSY      BREAST CYST EXCISION Left     BREAST SURGERY      CARDIAC DEFIBRILLATOR PLACEMENT      x 2    CATARACT EXTRACTION W/  INTRAOCULAR LENS IMPLANT Bilateral     CHOLECYSTECTOMY      COLONOSCOPY N/A 11/5/2019    Procedure: COLONOSCOPY;  Surgeon: Boaz Botello MD;  Location: [facility] ([facility]);  Service: Endoscopy;  Laterality: N/A;  AICD - [employer]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    FRACTURE SURGERY      left elbow and wrist as a child    HYSTERECTOMY      MASTECTOMY Right     REPLACEMENT OF IMPLANTABLE CARDIOVERTER-DEFIBRILLATOR (ICD) GENERATOR N/A 12/17/2018    Procedure: REPLACEMENT, ICD GENERATOR;  Surgeon: Jan Mckeon MD;  Location: [facility];  Service: Cardiology;  Laterality: N/A;  DONNA, CRT-D gen change, MDT, MAC, SK, 3 Prep    REVISION OF SKIN POCKET FOR CARDIOVERTER-DEFIBRILLATOR  12/17/2018    Procedure: Revision, Skin Pocket, For Cardioverter-Defibrillator;  Surgeon: Jan Mckeon MD;  Location: [facility];  Service: Cardiology;;    SQUAMOUS CELL CARCINOMA EXCISION      remved from forehead    TONSILLECTOMY         Review of patient's allergies indicates:   Allergen Reactions    Iodine and iodide containing products Hives    Nifedipine      weakness       No current facility-administered medications on file prior to encounter.     Current Outpatient Medications on File Prior to Encounter   Medication Sig    acetaminophen (TYLENOL) 500 MG tablet Take 500 mg by mouth once daily.    albuterol (PROVENTIL/VENTOLIN HFA) 90 mcg/actuation inhaler INHALE 2 PUFFS INTO THE LUNGS EVERY 6 (SIX) HOURS AS NEEDED FOR WHEEZING. RESCUE    albuterol-ipratropium (DUO-NEB) 2.5 mg-0.5 mg/3 mL nebulizer solution TAKE 3 MLS BY NEBULIZATION EVERY 4 (FOUR) HOURS AS NEEDED FOR WHEEZING.    aspirin 81 MG Chew Take 81 mg by mouth once daily.    benzonatate (TESSALON) 100 MG capsule Take 1 capsule (100 mg total)  by mouth 3 (three) times daily as needed for Cough.    carvediloL (COREG) 25 MG tablet TAKE 2 TABLETS (50 MG TOTAL) BY MOUTH 2 (TWO) TIMES DAILY.    cholecalciferol, vitamin D3, (VITAMIN D3) 50 mcg (2,000 unit) Tab Take 1 tablet by mouth once daily.     cyanocobalamin (VITAMIN B-12) 100 MCG tablet Take 100 mcg by mouth once daily.    estradioL (ESTRACE) 0.01 % (0.1 mg/gram) vaginal cream Place 1 g vaginally every Mon, Wed, Fri.    fluticasone propionate (FLONASE) 50 mcg/actuation nasal spray 2 sprays (100 mcg total) by Each Nostril route once daily.    fluticasone-salmeterol diskus inhaler 250-50 mcg Inhale 1 puff into the lungs as needed. Controller    furosemide (LASIX) 40 MG tablet Take 1 tablet (40 mg total) by mouth every 8 (eight) hours as needed (swelling). (Patient taking differently: Take 40 mg by mouth 2 (two) times a day.)    hydrALAZINE (APRESOLINE) 100 MG tablet Take 1 tablet (100 mg total) by mouth 2 (two) times daily.    insulin (LANTUS SOLOSTAR U-100 INSULIN) glargine 100 units/mL (3mL) SubQ pen Inject 22 Units into the skin once daily. (Patient taking differently: Inject 22 Units into the skin every morning.)    linaGLIPtin (TRADJENTA) 5 mg Tab tablet Take 1 tablet (5 mg total) by mouth once daily.    magnesium oxide (MAG-OX) 400 mg tablet Take 1 tablet (400 mg total) by mouth once daily. (Patient taking differently: Take 400 mg by mouth 3 (three) times daily.)    montelukast (SINGULAIR) 10 mg tablet Take 1 tablet (10 mg total) by mouth every evening. (Patient taking differently: Take 10 mg by mouth once daily.)    nitroGLYCERIN (NITROSTAT) 0.4 MG SL tablet Place 0.4 mg under the tongue every 5 (five) minutes as needed for Chest pain.     omega-3 fatty acids 500 mg Cap Take 1 capsule by mouth Daily.    polyethylene glycol (GLYCOLAX) 17 gram PwPk Take 17 g by mouth daily as needed (constipation).    pravastatin (PRAVACHOL) 40 MG tablet TAKE 1 TABLET BY MOUTH EVERY DAY (Patient taking differently:  "Take 40 mg by mouth once daily.)    sodium bicarbonate 650 MG tablet Take 2 tablets (1,300 mg total) by mouth 3 (three) times daily.    valsartan (DIOVAN) 80 MG tablet Take 1 tablet (80 mg total) by mouth 2 (two) times daily.    blood sugar diagnostic (ACCU-CHEK HECTOR) Strp Uses Accu-Check Hector meter to test BG 4x/day    lancets (ACCU-CHEK SOFTCLIX LANCETS) Misc Uses Accu-Chek Hector meter to test BG 1x/day    pen needle, diabetic (BD ULTRA-FINE MINI PEN NEEDLE) 31 gauge x 3/16" Ndle USE WITH LANTUS AT BEDTIME    pulse oximeter (PULSE OXIMETER) device by Apply Externally route 2 (two) times a day. Use twice daily at 8 AM and 3 PM and record the value in MyChart as directed.    [DISCONTINUED] benzonatate (TESSALON) 100 MG capsule Take 1 capsule (100 mg total) by mouth 3 (three) times daily as needed for Cough.    [DISCONTINUED] levocetirizine (XYZAL) 5 MG tablet Take 5 mg by mouth as needed.    [DISCONTINUED] sodium chloride 3% 3 % nebulizer solution TAKE 4 MLS BY NEBULIZATION 4 (FOUR) TIMES DAILY AS NEEDED FOR OTHER OR COUGH (TO INDUCE SPUTUM AND CLEAR MUCOUS.).    [DISCONTINUED] tiotropium (SPIRIVA) 18 mcg inhalation capsule Inhale 1 capsule (18 mcg total) into the lungs once daily. Controller     Family History       Problem Relation (Age of Onset)    Asthma Mother    Cancer Brother, Son, Daughter    Diabetes Father, Sister, Brother, Brother, Brother, Brother, Son, Daughter, Son    Heart disease Father, Sister, Brother, Brother, Brother    Hypertension Brother, Brother, Mother    Melanoma Daughter    No Known Problems Maternal Grandmother, Maternal Grandfather, Paternal Grandmother, Paternal Grandfather    Obesity Daughter    Stroke Mother          Tobacco Use    Smoking status: Never Smoker    Smokeless tobacco: Never Used   Substance and Sexual Activity    Alcohol use: No     Alcohol/week: 0.0 standard drinks    Drug use: No    Sexual activity: Yes     Partners: Male       Review of Systems:  GEN: Negative " unless otherwise stated in history of present illness  HEENT: Negative unless otherwise stated in the history of present illness  NECK: Negative unless otherwise stated in history of present illness  RESPIRATORY: Negative unless otherwise stated in history of present illness  CARDIOVASCULAR: Negative unless otherwise stated in history of present illness  GI: Negative unless otherwise stated in history of present illness  : Negative unless otherwise stated in history of present illness  EXTR: Negative unless otherwise stated in history of present illness  SKIN: Negative unless otherwise stated in history of present illness  MUSCULOSKELETAL: Negative unless otherwise stated in history of present illness  NEURO: Negative unless otherwise stated in history of present illness  PSYCH: Negative unless otherwise stated in history of present illness  Except as documented, all other systems reviewed and negative      Objective:     Vital Signs (Most Recent):  Temp: 98.8 °F (37.1 °C) (07/08/22 1802)  Pulse: 76 (07/08/22 2017)  Resp: (!) 23 (07/08/22 2017)  BP: (!) 162/68 (07/08/22 2003)  SpO2: (!) 92 % (07/08/22 2017)   Vital Signs (24h Range):  Temp:  [97.7 °F (36.5 °C)-98.8 °F (37.1 °C)] 98.8 °F (37.1 °C)  Pulse:  [59-77] 76  Resp:  [22-26] 23  SpO2:  [92 %-96 %] 92 %  BP: (120-162)/(67-80) 162/68     Weight: 56.7 kg (125 lb)  Body mass index is 22.14 kg/m².    Physical Exam:  GEN:  No acute distress, lying in bed   HEENT:  Normocephalic, atraumatic, extra ocular movements intact mask in place  NECK:  Supple, good range of motion  RESPIRATORY:  Decreased BS symmetrical chest rise  CARDIOVASCULAR:  Regular in rate, +s1/2  GI:  Soft, nontender, bowel sounds decreased  : No flank tenderness, normal genitalia  EXT: No edema, pulses palpated  SKIN: Warm, dry, no rashes  MUSCULOSKELETAL: Good range of motion, thih  NEURO:  Alert, oriented, answering questions appropriately  PSYCH:  Mood & affect appropriate,  pleasant    Significant Labs:    A1C: No results for input(s): HGBA1C in the last 24 hours.  Blood Culture: No results for input(s): LABBLOO in the last 24 hours.  BMP/CMP:   Recent Labs   Lab 07/08/22  1731   *   K 3.9   CL 85*   CO2 27   *   BUN 86*   CREATININE 2.7*   CALCIUM 8.9   PROT 6.8   ALBUMIN 3.1*   BILITOT 0.3   ALKPHOS 111   AST 20   ALT 14   ANIONGAP 17*   EGFRNONAA 16*     CBC:   Recent Labs   Lab 07/08/22  1731   WBC 10.23   HGB 8.3*   HCT 25.7*        Cardiac Markers:   Recent Labs   Lab 07/08/22  1735   *     Coagulation: No results for input(s): PT, INR, APTT in the last 24 hours.  Lactic Acid: No results for input(s): LACTATE in the last 24 hours.  Lipid Panel: No results for input(s): CHOL, HDL, LDLCALC, TRIG, CHOLHDL in the last 24 hours.  Magnesium: No results for input(s): MG in the last 24 hours.  POCT Glucose:   Recent Labs   Lab 07/08/22  1722   POCTGLUCOSE 278*     Troponin:   Recent Labs   Lab 07/08/22  1735   TROPONINI 0.020     TSH: No results for input(s): TSH in the last 24 hours.  Urine Culture: No results for input(s): LABURIN in the last 24 hours.  Urine Studies:   Recent Labs   Lab 07/08/22  1733   COLORU Yellow   APPEARANCEUA Clear   PHUR 7.0   SPECGRAV 1.010   PROTEINUA 2+*   GLUCUA Negative   KETONESU Negative   BILIRUBINUA Negative   OCCULTUA Negative   NITRITE Negative   UROBILINOGEN Negative   LEUKOCYTESUR 1+*   RBCUA 2   WBCUA 5   BACTERIA Rare   HYALINECASTS 1       Significant Imaging:     X-Ray Chest AP Portable  Narrative: EXAMINATION:  XR CHEST AP PORTABLE    CLINICAL HISTORY:  Cough, unspecified    TECHNIQUE:  Single frontal view of the chest was performed.    COMPARISON:  06/29/2022    FINDINGS:  Left chest wall pacer noted.  The cardiomediastinal silhouette is prominent, similar to the previous examination noting calcification of the aorta.  Patient is rotated..  There is no pleural effusion.  The trachea is midline.  The lungs are  symmetrically expanded bilaterally with coarse interstitial attenuation, similar to the previous exam.  There is patchy consolidation projected over the left mid to lower lung zone, new since the previous exam..  There is no pneumothorax.  The osseous structures are remarkable for degenerative changes and osteopenia..  Impression: 1. Interval development of patchy consolidation within the left mid and lower lung zones concerning for infection, correlation and follow-up is advised.  Findings are superimposed upon chronic interstitial attenuation, possibly reflecting underlying edema.    Electronically signed by: Nathanael Moralez MD  Date:    07/08/2022  Time:    17:51  Cardiac device check - Remote  Additional Comments  Remote interrogation report on CRT-D (implanted 12/17/2018)   Presenting egram demonstrates AS/BiVP   Device fxn WNL. DDD  50/130   Autocapture algorithms are on   RA pacing 1.9%, Bi-V pacing 99.8%   Atrial arrhythmias: none detected   Anticoagulation status: not on OAC   Ventricular arrhythmias: none detected   Battery status/longevity (estimated): 35 months   F/U via remote transmissions every 3 months   Appts 5/2023 xavier/MD and in clinic device check   Report prepared by Daniella Cabral tech

## 2022-07-09 NOTE — H&P
[facility]  [facility] Medicine  History & Physical    Patient Name: Myesha Quinones  MRN: [id number]  Patient Class: OP- Observation  Admission Date: 7/8/2022  Attending Physician: Danish Reyes, *   Primary Care Provider: Dayton Michael MD         Patient information was obtained from patient, past medical records and ER records.     Subjective:     Principal Problem:Acute hypoxemic respiratory failure    Chief Complaint:   Chief Complaint   Patient presents with    Fatigue     Worsening generalized weakness for 2 days. Accucheck-353 per ems.         HPI: 82F recently DC after being hospitalized for acute hypoxic respiratory failure due to COVID 19 PNA. Was brought to the ER with c/o several days of increasing weakness and lethargy. In the ER she was hypoxic on RA in the 80s which improved to 92-93 on NC. She was in no respiratory distress. Her CXR showed B/L infiltrates. She was given BSA Abx in the ER and  consulted for admission. SUNG ER MD.         Past Medical History:   Diagnosis Date    Acute hypoxemic respiratory failure 12/19/2019    Acute right-sided thoracic back pain 12/09/2019    Allergy     Asthma     Basal cell carcinoma     left forehead    Basal cell carcinoma     left nose    Basal cell carcinoma 05/20/2015    right nose    Basal cell carcinoma 12/22/2015    left lower post neck    Basal cell carcinoma 12/03/2019    left ant scalp     Bilateral renal cysts     Breast cancer     CAD (coronary artery disease)     Cardiomyopathy     Cardiomyopathy, ischemic     Cataract     CHF (congestive heart failure)     CKD (chronic kidney disease) stage 4, GFR 15-29 ml/min     Colon polyp 2011    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, with long-term current use of insulin 02/22/2018    COPD (chronic obstructive pulmonary disease)     COPD exacerbation 04/08/2018    Current mild episode of  major depressive disorder without prior episode 01/25/2022    Defibrillator discharge     Diabetes mellitus     Diabetes mellitus type II     Diabetes with neurologic complications     Edema     Goiter     MNG    Hematuria, unspecified     HX: breast cancer     Hyperlipidemia     Hypertension     Hypocalcemia     Hypokalemia     Hyponatremia     Hyponatremia 4/2/2022    Iron deficiency anemia 05/16/2017    Iron deficiency anemia     Left kidney mass     Meningioma     Microalbuminuria due to type 2 diabetes mellitus 01/26/2022    Osteoporosis, postmenopausal     Pneumonia 12/08/2019    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    Proteinuria 01/21/2019    Pseudomonas pneumonia     Skin cancer     s/p excision    Sleep apnea     CPAP    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/2015    mid forehead    Unspecified vitamin D deficiency     UTI (urinary tract infection) 04/02/2022    Ventricular tachycardia     Vitamin B12 deficiency     Vitamin D deficiency disease        Past Surgical History:   Procedure Laterality Date    BASAL CELL CARCINOMA EXCISION      posterior neck and nose    BREAST BIOPSY      BREAST CYST EXCISION Left     BREAST SURGERY      CARDIAC DEFIBRILLATOR PLACEMENT      x 2    CATARACT EXTRACTION W/  INTRAOCULAR LENS IMPLANT Bilateral     CHOLECYSTECTOMY      COLONOSCOPY N/A 11/5/2019    Procedure: COLONOSCOPY;  Surgeon: Boaz Botello MD;  Location: 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 EP LAB;  Service: Cardiology;  Laterality: N/A;  DONNA, CRT-D gen change, MDT, MAC, SK, 3 Prep    REVISION OF SKIN POCKET  FOR CARDIOVERTER-DEFIBRILLATOR  12/17/2018    Procedure: Revision, Skin Pocket, For Cardioverter-Defibrillator;  Surgeon: Jan Mckeon MD;  Location: [facility];  Service: Cardiology;;    SQUAMOUS CELL CARCINOMA EXCISION      remved from forehead    TONSILLECTOMY         Review of patient's allergies indicates:   Allergen Reactions    Iodine and iodide containing products Hives    Nifedipine      weakness       No current facility-administered medications on file prior to encounter.     Current Outpatient Medications on File Prior to Encounter   Medication Sig    acetaminophen (TYLENOL) 500 MG tablet Take 500 mg by mouth once daily.    albuterol (PROVENTIL/VENTOLIN HFA) 90 mcg/actuation inhaler INHALE 2 PUFFS INTO THE LUNGS EVERY 6 (SIX) HOURS AS NEEDED FOR WHEEZING. RESCUE    albuterol-ipratropium (DUO-NEB) 2.5 mg-0.5 mg/3 mL nebulizer solution TAKE 3 MLS BY NEBULIZATION EVERY 4 (FOUR) HOURS AS NEEDED FOR WHEEZING.    aspirin 81 MG Chew Take 81 mg by mouth once daily.    benzonatate (TESSALON) 100 MG capsule Take 1 capsule (100 mg total) by mouth 3 (three) times daily as needed for Cough.    carvediloL (COREG) 25 MG tablet TAKE 2 TABLETS (50 MG TOTAL) BY MOUTH 2 (TWO) TIMES DAILY.    cholecalciferol, vitamin D3, (VITAMIN D3) 50 mcg (2,000 unit) Tab Take 1 tablet by mouth once daily.     cyanocobalamin (VITAMIN B-12) 100 MCG tablet Take 100 mcg by mouth once daily.    estradioL (ESTRACE) 0.01 % (0.1 mg/gram) vaginal cream Place 1 g vaginally every Mon, Wed, Fri.    fluticasone propionate (FLONASE) 50 mcg/actuation nasal spray 2 sprays (100 mcg total) by Each Nostril route once daily.    fluticasone-salmeterol diskus inhaler 250-50 mcg Inhale 1 puff into the lungs as needed. Controller    furosemide (LASIX) 40 MG tablet Take 1 tablet (40 mg total) by mouth every 8 (eight) hours as needed (swelling). (Patient taking differently: Take 40 mg by mouth 2 (two) times a day.)    hydrALAZINE (APRESOLINE)  "100 MG tablet Take 1 tablet (100 mg total) by mouth 2 (two) times daily.    insulin (LANTUS SOLOSTAR U-100 INSULIN) glargine 100 units/mL (3mL) SubQ pen Inject 22 Units into the skin once daily. (Patient taking differently: Inject 22 Units into the skin every morning.)    linaGLIPtin (TRADJENTA) 5 mg Tab tablet Take 1 tablet (5 mg total) by mouth once daily.    magnesium oxide (MAG-OX) 400 mg tablet Take 1 tablet (400 mg total) by mouth once daily. (Patient taking differently: Take 400 mg by mouth 3 (three) times daily.)    montelukast (SINGULAIR) 10 mg tablet Take 1 tablet (10 mg total) by mouth every evening. (Patient taking differently: Take 10 mg by mouth once daily.)    nitroGLYCERIN (NITROSTAT) 0.4 MG SL tablet Place 0.4 mg under the tongue every 5 (five) minutes as needed for Chest pain.     omega-3 fatty acids 500 mg Cap Take 1 capsule by mouth Daily.    polyethylene glycol (GLYCOLAX) 17 gram PwPk Take 17 g by mouth daily as needed (constipation).    pravastatin (PRAVACHOL) 40 MG tablet TAKE 1 TABLET BY MOUTH EVERY DAY (Patient taking differently: Take 40 mg by mouth once daily.)    sodium bicarbonate 650 MG tablet Take 2 tablets (1,300 mg total) by mouth 3 (three) times daily.    valsartan (DIOVAN) 80 MG tablet Take 1 tablet (80 mg total) by mouth 2 (two) times daily.    blood sugar diagnostic (ACCU-CHEK HECTOR) Strp Uses Accu-Check Hector meter to test BG 4x/day    lancets (ACCU-CHEK SOFTCLIX LANCETS) Misc Uses Accu-Chek Hector meter to test BG 1x/day    pen needle, diabetic (BD ULTRA-FINE MINI PEN NEEDLE) 31 gauge x 3/16" Ndle USE WITH LANTUS AT BEDTIME    pulse oximeter (PULSE OXIMETER) device by Apply Externally route 2 (two) times a day. Use twice daily at 8 AM and 3 PM and record the value in MyChart as directed.    [DISCONTINUED] benzonatate (TESSALON) 100 MG capsule Take 1 capsule (100 mg total) by mouth 3 (three) times daily as needed for Cough.    [DISCONTINUED] levocetirizine (XYZAL) " 5 MG tablet Take 5 mg by mouth as needed.    [DISCONTINUED] sodium chloride 3% 3 % nebulizer solution TAKE 4 MLS BY NEBULIZATION 4 (FOUR) TIMES DAILY AS NEEDED FOR OTHER OR COUGH (TO INDUCE SPUTUM AND CLEAR MUCOUS.).    [DISCONTINUED] tiotropium (SPIRIVA) 18 mcg inhalation capsule Inhale 1 capsule (18 mcg total) into the lungs once daily. Controller     Family History       Problem Relation (Age of Onset)    Asthma Mother    Cancer Brother, Son, Daughter    Diabetes Father, Sister, Brother, Brother, Brother, Brother, Son, Daughter, Son    Heart disease Father, Sister, Brother, Brother, Brother    Hypertension Brother, Brother, Mother    Melanoma Daughter    No Known Problems Maternal Grandmother, Maternal Grandfather, Paternal Grandmother, Paternal Grandfather    Obesity Daughter    Stroke Mother          Tobacco Use    Smoking status: Never Smoker    Smokeless tobacco: Never Used   Substance and Sexual Activity    Alcohol use: No     Alcohol/week: 0.0 standard drinks    Drug use: No    Sexual activity: Yes     Partners: Male       Review of Systems:  GEN: Negative unless otherwise stated in history of present illness  HEENT: Negative unless otherwise stated in the history of present illness  NECK: Negative unless otherwise stated in history of present illness  RESPIRATORY: Negative unless otherwise stated in history of present illness  CARDIOVASCULAR: Negative unless otherwise stated in history of present illness  GI: Negative unless otherwise stated in history of present illness  : Negative unless otherwise stated in history of present illness  EXTR: Negative unless otherwise stated in history of present illness  SKIN: Negative unless otherwise stated in history of present illness  MUSCULOSKELETAL: Negative unless otherwise stated in history of present illness  NEURO: Negative unless otherwise stated in history of present illness  PSYCH: Negative unless otherwise stated in history of present  illness  Except as documented, all other systems reviewed and negative      Objective:     Vital Signs (Most Recent):  Temp: 98.8 °F (37.1 °C) (07/08/22 1802)  Pulse: 76 (07/08/22 2017)  Resp: (!) 23 (07/08/22 2017)  BP: (!) 162/68 (07/08/22 2003)  SpO2: (!) 92 % (07/08/22 2017)   Vital Signs (24h Range):  Temp:  [97.7 °F (36.5 °C)-98.8 °F (37.1 °C)] 98.8 °F (37.1 °C)  Pulse:  [59-77] 76  Resp:  [22-26] 23  SpO2:  [92 %-96 %] 92 %  BP: (120-162)/(67-80) 162/68     Weight: 56.7 kg (125 lb)  Body mass index is 22.14 kg/m².    Physical Exam:  GEN:  No acute distress, lying in bed   HEENT:  Normocephalic, atraumatic, extra ocular movements intact mask in place  NECK:  Supple, good range of motion  RESPIRATORY:  Decreased BS symmetrical chest rise  CARDIOVASCULAR:  Regular in rate, +s1/2  GI:  Soft, nontender, bowel sounds decreased  : No flank tenderness, normal genitalia  EXT: No edema, pulses palpated  SKIN: Warm, dry, no rashes  MUSCULOSKELETAL: Good range of motion, thih  NEURO:  Alert, oriented, answering questions appropriately  PSYCH:  Mood & affect appropriate, pleasant    Significant Labs:    A1C: No results for input(s): HGBA1C in the last 24 hours.  Blood Culture: No results for input(s): LABBLOO in the last 24 hours.  BMP/CMP:   Recent Labs   Lab 07/08/22  1731   *   K 3.9   CL 85*   CO2 27   *   BUN 86*   CREATININE 2.7*   CALCIUM 8.9   PROT 6.8   ALBUMIN 3.1*   BILITOT 0.3   ALKPHOS 111   AST 20   ALT 14   ANIONGAP 17*   EGFRNONAA 16*     CBC:   Recent Labs   Lab 07/08/22  1731   WBC 10.23   HGB 8.3*   HCT 25.7*        Cardiac Markers:   Recent Labs   Lab 07/08/22  1735   *     Coagulation: No results for input(s): PT, INR, APTT in the last 24 hours.  Lactic Acid: No results for input(s): LACTATE in the last 24 hours.  Lipid Panel: No results for input(s): CHOL, HDL, LDLCALC, TRIG, CHOLHDL in the last 24 hours.  Magnesium: No results for input(s): MG in the last 24 hours.  POCT  Glucose:   Recent Labs   Lab 07/08/22  1722   POCTGLUCOSE 278*     Troponin:   Recent Labs   Lab 07/08/22  1735   TROPONINI 0.020     TSH: No results for input(s): TSH in the last 24 hours.  Urine Culture: No results for input(s): LABURIN in the last 24 hours.  Urine Studies:   Recent Labs   Lab 07/08/22  1733   COLORU Yellow   APPEARANCEUA Clear   PHUR 7.0   SPECGRAV 1.010   PROTEINUA 2+*   GLUCUA Negative   KETONESU Negative   BILIRUBINUA Negative   OCCULTUA Negative   NITRITE Negative   UROBILINOGEN Negative   LEUKOCYTESUR 1+*   RBCUA 2   WBCUA 5   BACTERIA Rare   HYALINECASTS 1       Significant Imaging:     X-Ray Chest AP Portable  Narrative: EXAMINATION:  XR CHEST AP PORTABLE    CLINICAL HISTORY:  Cough, unspecified    TECHNIQUE:  Single frontal view of the chest was performed.    COMPARISON:  06/29/2022    FINDINGS:  Left chest wall pacer noted.  The cardiomediastinal silhouette is prominent, similar to the previous examination noting calcification of the aorta.  Patient is rotated..  There is no pleural effusion.  The trachea is midline.  The lungs are symmetrically expanded bilaterally with coarse interstitial attenuation, similar to the previous exam.  There is patchy consolidation projected over the left mid to lower lung zone, new since the previous exam..  There is no pneumothorax.  The osseous structures are remarkable for degenerative changes and osteopenia..  Impression: 1. Interval development of patchy consolidation within the left mid and lower lung zones concerning for infection, correlation and follow-up is advised.  Findings are superimposed upon chronic interstitial attenuation, possibly reflecting underlying edema.    Electronically signed by: Nathanael Moralez MD  Date:    07/08/2022  Time:    17:51  Cardiac device check - Remote  Additional Comments  Remote interrogation report on CRT-D (implanted 12/17/2018)   Presenting egram demonstrates AS/BiVP   Device fxn WNL. DDD  50/130   Autocapture  algorithms are on   RA pacing 1.9%, Bi-V pacing 99.8%   Atrial arrhythmias: none detected   Anticoagulation status: not on OAC   Ventricular arrhythmias: none detected   Battery status/longevity (estimated): 35 months   F/U via remote transmissions every 3 months   Appts 5/2023 w/MD and in clinic device check   Report prepared by petar Jones           Assessment/Plan:     * Acute hypoxemic respiratory failure  See HAP  Will wean O2 as tolerated  Monitor vol status  Repeat CXR in AM  Scheduled nebs q6hA  Added steroids   ?covid hauler       CKD (chronic kidney disease) stage 4, GFR 15-29 ml/min  Renal fxn at baseline, Scr slightly up but delta Cr unchanged  No nsaids or cox2 (-)  Repeat chem        HAP (hospital-acquired pneumonia)  BSA   Added Azithro to cover atypicals  If MRSA screen (-) can DC Vanc  RC  Trend PCT  Added steroids for Rx of inPt PNA with hypoxia. Will wean  Repeat CXR in AM to reassess consolidation/effusion may need pulm eval for thora    ACP (advance care planning)  DNI per Pt and dtr  Ok for chest compressions       Type 2 diabetes mellitus, with long-term current use of insulin  Basal + ISS  She's on steroids and her dtr stated that the last time she was on steroids she glu readings > 800.   Will monitor adjust based on trends  A1C      Hyponatremia  Likely 2/2 lung pathology --> ?SIADH. Avoid isotonic saline  Min fluid restriction.   Ustudigavin Ashraf    Monitor vol status       Weakness  PTOTSW  Fall precautions       VTE Risk Mitigation (From admission, onward)         Ordered     IP VTE HIGH RISK PATIENT  Once         07/08/22 1949     Place sequential compression device  Until discontinued         07/08/22 1949                   Steffen Guerrero MD  Department of Hospital Medicine   [place] - Emergency Dept

## 2022-07-09 NOTE — ASSESSMENT & PLAN NOTE
Patient is identified as having Combined Systolic and Diastolic heart failure that is Chronic. CHF is currently controlled. Latest ECHO performed and demonstrates- Results for orders placed during the hospital encounter of 06/20/22    Echo 6/1/22    Interpretation Summary  · The left ventricle is normal in size with concentric hypertrophy and normal systolic function.  · The estimated ejection fraction is 55%.  · Grade II left ventricular diastolic dysfunction.  · Normal right ventricular size with normal right ventricular systolic function.  · Severe left atrial enlargement.  · Mild mitral regurgitation.  · Mild to moderate tricuspid regurgitation.  · Intermediate central venous pressure (8 mmHg).  · The estimated PA systolic pressure is 56 mmHg.  · There is pulmonary hypertension.  · There is a left pleural effusion.  . Continue Furosemide and monitor clinical status closely. Monitor on telemetry. Patient is off CHF pathway.  Monitor strict Is&Os and daily weights.  Place on fluid restriction of 1.5 L. Continue to stress to patient importance of self efficacy and  on diet for CHF. Last BNP reviewed- and noted below   Recent Labs   Lab 07/08/22  1735   *         Intake/Output Summary (Last 24 hours) at 7/9/2022 1336  Last data filed at 7/9/2022 0800  Gross per 24 hour   Intake 185 ml   Output 1250 ml   Net -1065 ml

## 2022-07-09 NOTE — ASSESSMENT & PLAN NOTE
Mycobacterium avium complex      -Followed by ID, Dr. Amaya; was placed on Voriconazole therapy but unable to tolerate  - With recent pseudomonas in sputum. Completed treated with Cefepime for 5 days on the previous admission  - Is admitted for pneumonia with worsening CXR  - Consult Pulmonary

## 2022-07-09 NOTE — ASSESSMENT & PLAN NOTE
See HAP  Will wean O2 as tolerated  Monitor vol status  Repeat CXR in AM  Scheduled nebs q6hA  Added steroids   ?covid hauler

## 2022-07-09 NOTE — SUBJECTIVE & OBJECTIVE
Interval History: Reports feels fatigue and PELAYO. Denies any chest pain or SOB at rest. On 4L NC. On Cefepime/Azithromycin/Vancomycin. Cultures pending. Pulmonary consulted.    Review of Systems   Constitutional:  Positive for appetite change and fatigue. Negative for chills and fever.   HENT:  Negative for sore throat and trouble swallowing.    Respiratory:  Positive for cough and shortness of breath. Negative for wheezing.    Cardiovascular:  Negative for chest pain and leg swelling.   Gastrointestinal:  Positive for constipation. Negative for abdominal pain.   Genitourinary:  Negative for dysuria and hematuria.        With De La Cruz   Musculoskeletal:  Positive for gait problem.   Skin:  Negative for rash.   Neurological:  Positive for weakness. Negative for dizziness and headaches.   Psychiatric/Behavioral:  Negative for confusion.    Objective:     Vital Signs (Most Recent):  Temp: 97 °F (36.1 °C) (07/09/22 1045)  Pulse: 64 (07/09/22 1200)  Resp: 18 (07/09/22 1045)  BP: 137/66 (07/09/22 1045)  SpO2: (!) 94 % (07/09/22 1045)   Vital Signs (24h Range):  Temp:  [96.1 °F (35.6 °C)-98.8 °F (37.1 °C)] 97 °F (36.1 °C)  Pulse:  [60-80] 64  Resp:  [18-26] 18  SpO2:  [90 %-95 %] 94 %  BP: (124-162)/(58-72) 137/66     Weight: 59.5 kg (131 lb 2.8 oz)  Body mass index is 23.24 kg/m².    Intake/Output Summary (Last 24 hours) at 7/9/2022 1237  Last data filed at 7/9/2022 0800  Gross per 24 hour   Intake 185 ml   Output 1250 ml   Net -1065 ml      Physical Exam  Constitutional:       General: She is not in acute distress.     Appearance: She is not diaphoretic.   HENT:      Head: Normocephalic and atraumatic.      Nose: No congestion or rhinorrhea.      Mouth/Throat:      Mouth: Mucous membranes are moist.   Eyes:      Conjunctiva/sclera: Conjunctivae normal.   Cardiovascular:      Rate and Rhythm: Normal rate.      Heart sounds: No murmur heard.  Pulmonary:      Breath sounds: Decreased breath sounds (Left side) present. No  wheezing or rales.   Chest:      Chest wall: No tenderness.   Abdominal:      General: Bowel sounds are normal.      Palpations: Abdomen is soft.      Tenderness: There is no abdominal tenderness. There is no right CVA tenderness, left CVA tenderness or guarding.   Musculoskeletal:      Cervical back: Neck supple.      Right lower leg: Edema (trace) present.      Left lower leg: Edema (trace) present.   Skin:     Findings: Bruising (BUE) present.   Neurological:      Mental Status: She is alert and oriented to person, place, and time.      Motor: Weakness present.   Psychiatric:         Mood and Affect: Mood normal.         Thought Content: Thought content normal.       Significant Labs: All pertinent labs within the past 24 hours have been reviewed.    Significant Imaging: I have reviewed all pertinent imaging results/findings within the past 24 hours.

## 2022-07-09 NOTE — PLAN OF CARE
Problem: Adult Inpatient Plan of Care  Goal: Plan of Care Review  Outcome: Ongoing, Progressing     VIRTUAL NURSE:  Cued into patient's room.  Permission received per patient to turn camera to view patient.  Introduced as VN for night shift that will be working with floor nurse and nursing assistant.  Educated patient on VN's role in patient care and  VIP model.  Plan of care reviewed with patient.  Education per flowsheet.   Informed patient that staff will round on them every 2 hours but to use call light for any other needs they may have; informed of fall risk and fall precautions.  Patient verbalized understanding.  Call light within reach; bed siderails up x3.  Opportunity given for questions and questions answered.  Admission assessment questions answered.  Instructed to call for assistance.  Will cont to monitor and intervene as needed.    Labs, notes, orders, and careplan reviewed.         07/08/22 5822   Patient Request   Patient Requested no complaints or needs currently; daughter at bedside   Provider Notification   Provider Notified? Yes   Name of Provider Dr. Contreras   Provider Notification Method Secure Chat   Provider Notified Of Orders  (oxygen and lauren order)   Admission   Initial VN Admission Questions Complete   Communication Issues? Patient Hearing   Shift   Virtual Nurse - Rounding Complete   Pain Management Interventions pain management plan reviewed with patient/caregiver   Virtual Nurse - Patient Verbalized Approval Of Camera Use;VN Rounding   Type of Frequent Check   Type Patient Rounds   Safety/Activity   Patient Rounds bed in low position;placement of personal items at bedside;bed wheels locked;call light in patient/parent reach;visualized patient;clutter free environment maintained   Safety Promotion/Fall Prevention assistive device/personal item within reach;diversional activities provided;Fall Risk reviewed with patient/family;family to remain at bedside;high risk medications  identified;medications reviewed;nonskid shoes/socks when out of bed;room near unit station;side rails raised x 3;supervised activity;instructed to call staff for mobility   Safety Precautions emergency equipment at bedside   Positioning   Body Position neutral body alignment;neutral head position   Head of Bed (HOB) Positioning HOB at 60 degrees   Pain/Comfort/Sleep   Preferred Pain Scale number (Numeric Rating Pain Scale)   Comfort/Acceptable Pain Level 0   Pain Rating (0-10): Rest 0   Sleep/Rest/Relaxation no problem identified;awake

## 2022-07-09 NOTE — ASSESSMENT & PLAN NOTE
BSA (Cefepime and Vancomycin)  Added Azithro to cover atypicals  If MRSA screen (-) can DC Vanc  RC  Trend PCT  Added steroids for Rx of inPt PNA with hypoxia. Steroids discontinued  Consult Pulmonary due to worsening CXR

## 2022-07-09 NOTE — PROGRESS NOTES
Pharmacokinetic Initial Assessment: IV Vancomycin    Assessment/Plan:    Initiate intravenous vancomycin with loading dose of 1250 mg once with subsequent doses when random concentrations are less than 20 mcg/mL  Desired empiric serum trough concentration is 15 to 20 mcg/mL  Draw vancomycin random level on 7/9 at 2100.  Pharmacy will continue to follow and monitor vancomycin.      Please contact pharmacy at extension [phone] with any questions regarding this assessment.     Thank you for the consult,   Scott HOA Brooks       Patient brief summary:  Myesha Quinones is a 82 y.o. female initiated on antimicrobial therapy with IV Vancomycin for treatment of suspected lower respiratory infection    Drug Allergies:   Review of patient's allergies indicates:   Allergen Reactions    Iodine and iodide containing products Hives    Nifedipine      weakness       Actual Body Weight:   56kg    Renal Function:   Estimated Creatinine Clearance: 13.1 mL/min (A) (based on SCr of 2.7 mg/dL (H)).,     Dialysis Method (if applicable):  N/A    CBC (last 72 hours):  Recent Labs   Lab Result Units 07/08/22  1731   WBC K/uL 10.23   Hemoglobin g/dL 8.3*   Hematocrit % 25.7*   Platelets K/uL 277   Gran % % 77.4*   Lymph % % 14.6*   Mono % % 5.6   Eosinophil % % 0.9   Basophil % % 0.3   Differential Method  Automated       Metabolic Panel (last 72 hours):  Recent Labs   Lab Result Units 07/08/22  1731 07/08/22  1733   Sodium mmol/L 129*  --    Potassium mmol/L 3.9  --    Chloride mmol/L 85*  --    CO2 mmol/L 27  --    Glucose mg/dL 240*  --    Glucose, UA   --  Negative   BUN mg/dL 86*  --    Creatinine mg/dL 2.7*  --    Albumin g/dL 3.1*  --    Total Bilirubin mg/dL 0.3  --    Alkaline Phosphatase U/L 111  --    AST U/L 20  --    ALT U/L 14  --        Drug levels (last 3 results):  No results for input(s): VANCOMYCINRA, VANCORANDOM, VANCOMYCINPE, VANCOPEAK, VANCOMYCINTR, VANCOTROUGH in the last 72 hours.    Microbiologic  Results:  Microbiology Results (last 7 days)       Procedure Component Value Units Date/Time    Culture, Respiratory with Gram Stain [[id number]]     Order Status: No result Specimen: Respiratory from Sputum

## 2022-07-09 NOTE — ASSESSMENT & PLAN NOTE
Likely 2/2 lung pathology --> ?SIADH. Avoid isotonic saline  Min fluid restriction.   Ustudies  Ulegionella    Monitor vol status

## 2022-07-09 NOTE — ASSESSMENT & PLAN NOTE
See HAP  Will wean O2 as tolerated  Monitor vol status  Repeat CXR in AM - worsening CXR  Scheduled nebs q6hA  Added steroids - Steroids discontinued due to no wheezing/SOB on exam and hx uncontrolled blood glucose on steroids   ?covid hauler

## 2022-07-09 NOTE — PLAN OF CARE
The proper method of use, as well as anticipated side effects, of this aerosol treatment are discussed and demonstrated to the patient.  Patient on oxygen with documented flow.  Will attempt to wean per O2 order protocol. The proper method of use, as well as anticipated side effects, of this metered-dose inhaler are discussed and demonstrated to the patient. Will continue to monitor.

## 2022-07-09 NOTE — ASSESSMENT & PLAN NOTE
Basal + ISS  She's on steroids and her dtr stated that the last time she was on steroids she glu readings > 800.   Will monitor adjust based on trends  A1C 8.0 (7/8)

## 2022-07-09 NOTE — ASSESSMENT & PLAN NOTE
Basal + ISS  She's on steroids and her dtr stated that the last time she was on steroids she glu readings > 800.   Will monitor adjust based on trends  A1C

## 2022-07-09 NOTE — ASSESSMENT & PLAN NOTE
BSA   Added Azithro to cover atypicals  If MRSA screen (-) can DC Vanc  RC  Trend PCT  Added steroids for Rx of inPt PNA with hypoxia. Will wean  Repeat CXR in AM to reassess consolidation/effusion may need pulm eval for thora

## 2022-07-10 LAB
ANION GAP SERPL CALC-SCNC: 16 MMOL/L (ref 8–16)
ANION GAP SERPL CALC-SCNC: 18 MMOL/L (ref 8–16)
B-OH-BUTYR BLD STRIP-SCNC: 0.1 MMOL/L (ref 0–0.5)
BASOPHILS # BLD AUTO: 0.02 K/UL (ref 0–0.2)
BASOPHILS NFR BLD: 0.2 % (ref 0–1.9)
BNP SERPL-MCNC: 905 PG/ML (ref 0–99)
BUN SERPL-MCNC: 91 MG/DL (ref 8–23)
BUN SERPL-MCNC: 93 MG/DL (ref 8–23)
CALCIUM SERPL-MCNC: 8.7 MG/DL (ref 8.7–10.5)
CALCIUM SERPL-MCNC: 9.1 MG/DL (ref 8.7–10.5)
CHLORIDE SERPL-SCNC: 86 MMOL/L (ref 95–110)
CHLORIDE SERPL-SCNC: 87 MMOL/L (ref 95–110)
CO2 SERPL-SCNC: 24 MMOL/L (ref 23–29)
CO2 SERPL-SCNC: 26 MMOL/L (ref 23–29)
CREAT SERPL-MCNC: 2.6 MG/DL (ref 0.5–1.4)
CREAT SERPL-MCNC: 2.7 MG/DL (ref 0.5–1.4)
DIFFERENTIAL METHOD: ABNORMAL
EOSINOPHIL # BLD AUTO: 0 K/UL (ref 0–0.5)
EOSINOPHIL NFR BLD: 0 % (ref 0–8)
ERYTHROCYTE [DISTWIDTH] IN BLOOD BY AUTOMATED COUNT: 14.9 % (ref 11.5–14.5)
EST. GFR  (AFRICAN AMERICAN): 18 ML/MIN/1.73 M^2
EST. GFR  (AFRICAN AMERICAN): 19 ML/MIN/1.73 M^2
EST. GFR  (NON AFRICAN AMERICAN): 16 ML/MIN/1.73 M^2
EST. GFR  (NON AFRICAN AMERICAN): 16 ML/MIN/1.73 M^2
GLUCOSE SERPL-MCNC: 154 MG/DL (ref 70–110)
GLUCOSE SERPL-MCNC: 345 MG/DL (ref 70–110)
HCT VFR BLD AUTO: 24.3 % (ref 37–48.5)
HGB BLD-MCNC: 8 G/DL (ref 12–16)
IMM GRANULOCYTES # BLD AUTO: 0.17 K/UL (ref 0–0.04)
IMM GRANULOCYTES NFR BLD AUTO: 1.4 % (ref 0–0.5)
LYMPHOCYTES # BLD AUTO: 1 K/UL (ref 1–4.8)
LYMPHOCYTES NFR BLD: 7.7 % (ref 18–48)
MCH RBC QN AUTO: 28.7 PG (ref 27–31)
MCHC RBC AUTO-ENTMCNC: 32.9 G/DL (ref 32–36)
MCV RBC AUTO: 87 FL (ref 82–98)
MONOCYTES # BLD AUTO: 0.6 K/UL (ref 0.3–1)
MONOCYTES NFR BLD: 4.6 % (ref 4–15)
NEUTROPHILS # BLD AUTO: 10.6 K/UL (ref 1.8–7.7)
NEUTROPHILS NFR BLD: 86.1 % (ref 38–73)
NRBC BLD-RTO: 0 /100 WBC
PLATELET # BLD AUTO: 297 K/UL (ref 150–450)
PMV BLD AUTO: 10.2 FL (ref 9.2–12.9)
POCT GLUCOSE: 122 MG/DL (ref 70–110)
POCT GLUCOSE: 151 MG/DL (ref 70–110)
POCT GLUCOSE: 217 MG/DL (ref 70–110)
POCT GLUCOSE: 274 MG/DL (ref 70–110)
POTASSIUM SERPL-SCNC: 4 MMOL/L (ref 3.5–5.1)
POTASSIUM SERPL-SCNC: 4 MMOL/L (ref 3.5–5.1)
RBC # BLD AUTO: 2.79 M/UL (ref 4–5.4)
SODIUM SERPL-SCNC: 128 MMOL/L (ref 136–145)
SODIUM SERPL-SCNC: 129 MMOL/L (ref 136–145)
VANCOMYCIN SERPL-MCNC: 22.2 UG/ML
WBC # BLD AUTO: 12.28 K/UL (ref 3.9–12.7)

## 2022-07-10 PROCEDURE — 25000242 PHARM REV CODE 250 ALT 637 W/ HCPCS: Performed by: STUDENT IN AN ORGANIZED HEALTH CARE EDUCATION/TRAINING PROGRAM

## 2022-07-10 PROCEDURE — 25000003 PHARM REV CODE 250: Performed by: INTERNAL MEDICINE

## 2022-07-10 PROCEDURE — 87206 SMEAR FLUORESCENT/ACID STAI: CPT | Performed by: STUDENT IN AN ORGANIZED HEALTH CARE EDUCATION/TRAINING PROGRAM

## 2022-07-10 PROCEDURE — 99900035 HC TECH TIME PER 15 MIN (STAT)

## 2022-07-10 PROCEDURE — 83880 ASSAY OF NATRIURETIC PEPTIDE: CPT | Performed by: NURSE PRACTITIONER

## 2022-07-10 PROCEDURE — 25000003 PHARM REV CODE 250: Performed by: HOSPITALIST

## 2022-07-10 PROCEDURE — 63700000 PHARM REV CODE 250 ALT 637 W/O HCPCS: Performed by: INTERNAL MEDICINE

## 2022-07-10 PROCEDURE — 94761 N-INVAS EAR/PLS OXIMETRY MLT: CPT

## 2022-07-10 PROCEDURE — 82010 KETONE BODYS QUAN: CPT | Performed by: NURSE PRACTITIONER

## 2022-07-10 PROCEDURE — 63600175 PHARM REV CODE 636 W HCPCS: Performed by: INTERNAL MEDICINE

## 2022-07-10 PROCEDURE — 80202 ASSAY OF VANCOMYCIN: CPT | Performed by: HOSPITALIST

## 2022-07-10 PROCEDURE — 63600175 PHARM REV CODE 636 W HCPCS: Performed by: HOSPITALIST

## 2022-07-10 PROCEDURE — 11000001 HC ACUTE MED/SURG PRIVATE ROOM

## 2022-07-10 PROCEDURE — 87015 SPECIMEN INFECT AGNT CONCNTJ: CPT | Performed by: STUDENT IN AN ORGANIZED HEALTH CARE EDUCATION/TRAINING PROGRAM

## 2022-07-10 PROCEDURE — 36415 COLL VENOUS BLD VENIPUNCTURE: CPT | Performed by: NURSE PRACTITIONER

## 2022-07-10 PROCEDURE — 36415 COLL VENOUS BLD VENIPUNCTURE: CPT | Performed by: HOSPITALIST

## 2022-07-10 PROCEDURE — 97530 THERAPEUTIC ACTIVITIES: CPT

## 2022-07-10 PROCEDURE — 94640 AIRWAY INHALATION TREATMENT: CPT

## 2022-07-10 PROCEDURE — 97165 OT EVAL LOW COMPLEX 30 MIN: CPT

## 2022-07-10 PROCEDURE — 80048 BASIC METABOLIC PNL TOTAL CA: CPT | Performed by: INTERNAL MEDICINE

## 2022-07-10 PROCEDURE — 87102 FUNGUS ISOLATION CULTURE: CPT | Performed by: STUDENT IN AN ORGANIZED HEALTH CARE EDUCATION/TRAINING PROGRAM

## 2022-07-10 PROCEDURE — 63600175 PHARM REV CODE 636 W HCPCS: Performed by: STUDENT IN AN ORGANIZED HEALTH CARE EDUCATION/TRAINING PROGRAM

## 2022-07-10 PROCEDURE — 87118 MYCOBACTERIC IDENTIFICATION: CPT | Mod: 59 | Performed by: STUDENT IN AN ORGANIZED HEALTH CARE EDUCATION/TRAINING PROGRAM

## 2022-07-10 PROCEDURE — 89220 SPUTUM SPECIMEN COLLECTION: CPT

## 2022-07-10 PROCEDURE — 80048 BASIC METABOLIC PNL TOTAL CA: CPT | Mod: 91 | Performed by: NURSE PRACTITIONER

## 2022-07-10 PROCEDURE — 27000221 HC OXYGEN, UP TO 24 HOURS

## 2022-07-10 PROCEDURE — 36415 COLL VENOUS BLD VENIPUNCTURE: CPT | Performed by: INTERNAL MEDICINE

## 2022-07-10 PROCEDURE — 25000003 PHARM REV CODE 250: Performed by: NURSE PRACTITIONER

## 2022-07-10 PROCEDURE — 87149 DNA/RNA DIRECT PROBE: CPT | Performed by: STUDENT IN AN ORGANIZED HEALTH CARE EDUCATION/TRAINING PROGRAM

## 2022-07-10 PROCEDURE — C9399 UNCLASSIFIED DRUGS OR BIOLOG: HCPCS | Performed by: NURSE PRACTITIONER

## 2022-07-10 PROCEDURE — 63600175 PHARM REV CODE 636 W HCPCS: Performed by: NURSE PRACTITIONER

## 2022-07-10 PROCEDURE — 25000242 PHARM REV CODE 250 ALT 637 W/ HCPCS: Performed by: INTERNAL MEDICINE

## 2022-07-10 PROCEDURE — 85025 COMPLETE CBC W/AUTO DIFF WBC: CPT | Performed by: INTERNAL MEDICINE

## 2022-07-10 PROCEDURE — 87116 MYCOBACTERIA CULTURE: CPT | Performed by: STUDENT IN AN ORGANIZED HEALTH CARE EDUCATION/TRAINING PROGRAM

## 2022-07-10 RX ORDER — INSULIN ASPART 100 [IU]/ML
3 INJECTION, SOLUTION INTRAVENOUS; SUBCUTANEOUS
Status: DISCONTINUED | OUTPATIENT
Start: 2022-07-10 | End: 2022-07-10

## 2022-07-10 RX ORDER — ALBUTEROL SULFATE 2.5 MG/.5ML
2.5 SOLUTION RESPIRATORY (INHALATION)
Status: DISCONTINUED | OUTPATIENT
Start: 2022-07-10 | End: 2022-07-15 | Stop reason: HOSPADM

## 2022-07-10 RX ORDER — HEPARIN SODIUM 5000 [USP'U]/ML
5000 INJECTION, SOLUTION INTRAVENOUS; SUBCUTANEOUS EVERY 8 HOURS
Status: COMPLETED | OUTPATIENT
Start: 2022-07-10 | End: 2022-07-10

## 2022-07-10 RX ORDER — SODIUM CHLORIDE FOR INHALATION 3 %
4 VIAL, NEBULIZER (ML) INHALATION
Status: DISCONTINUED | OUTPATIENT
Start: 2022-07-10 | End: 2022-07-15 | Stop reason: HOSPADM

## 2022-07-10 RX ORDER — INSULIN ASPART 100 [IU]/ML
2 INJECTION, SOLUTION INTRAVENOUS; SUBCUTANEOUS
Status: DISCONTINUED | OUTPATIENT
Start: 2022-07-10 | End: 2022-07-11

## 2022-07-10 RX ADMIN — PRAVASTATIN SODIUM 40 MG: 40 TABLET ORAL at 09:07

## 2022-07-10 RX ADMIN — LACTOBACILLUS ACIDOPHILUS / LACTOBACILLUS BULGARICUS 1 EACH: 100 MILLION CFU STRENGTH GRANULES at 09:07

## 2022-07-10 RX ADMIN — IPRATROPIUM BROMIDE AND ALBUTEROL SULFATE 3 ML: .5; 3 SOLUTION RESPIRATORY (INHALATION) at 02:07

## 2022-07-10 RX ADMIN — CARVEDILOL 6.25 MG: 6.25 TABLET, FILM COATED ORAL at 05:07

## 2022-07-10 RX ADMIN — ALBUTEROL SULFATE 2.5 MG: 2.5 SOLUTION RESPIRATORY (INHALATION) at 05:07

## 2022-07-10 RX ADMIN — INSULIN ASPART 6 UNITS: 100 INJECTION, SOLUTION INTRAVENOUS; SUBCUTANEOUS at 09:07

## 2022-07-10 RX ADMIN — FLUTICASONE PROPIONATE 100 MCG: 50 SPRAY, METERED NASAL at 09:07

## 2022-07-10 RX ADMIN — IPRATROPIUM BROMIDE AND ALBUTEROL SULFATE 3 ML: .5; 3 SOLUTION RESPIRATORY (INHALATION) at 08:07

## 2022-07-10 RX ADMIN — CARVEDILOL 6.25 MG: 6.25 TABLET, FILM COATED ORAL at 09:07

## 2022-07-10 RX ADMIN — INSULIN ASPART 2 UNITS: 100 INJECTION, SOLUTION INTRAVENOUS; SUBCUTANEOUS at 09:07

## 2022-07-10 RX ADMIN — INSULIN ASPART 2 UNITS: 100 INJECTION, SOLUTION INTRAVENOUS; SUBCUTANEOUS at 05:07

## 2022-07-10 RX ADMIN — Medication 6 MG: at 09:07

## 2022-07-10 RX ADMIN — GUAIFENESIN AND DEXTROMETHORPHAN HYDROBROMIDE 1 TABLET: 600; 30 TABLET, EXTENDED RELEASE ORAL at 09:07

## 2022-07-10 RX ADMIN — MUPIROCIN: 20 OINTMENT TOPICAL at 10:07

## 2022-07-10 RX ADMIN — ACETAMINOPHEN 650 MG: 325 TABLET ORAL at 04:07

## 2022-07-10 RX ADMIN — POLYETHYLENE GLYCOL 3350 17 G: 17 POWDER, FOR SOLUTION ORAL at 09:07

## 2022-07-10 RX ADMIN — INSULIN ASPART 2 UNITS: 100 INJECTION, SOLUTION INTRAVENOUS; SUBCUTANEOUS at 11:07

## 2022-07-10 RX ADMIN — MONTELUKAST 10 MG: 10 TABLET, FILM COATED ORAL at 09:07

## 2022-07-10 RX ADMIN — HEPARIN SODIUM 5000 UNITS: 5000 INJECTION, SOLUTION INTRAVENOUS; SUBCUTANEOUS at 02:07

## 2022-07-10 RX ADMIN — IPRATROPIUM BROMIDE AND ALBUTEROL SULFATE 3 ML: .5; 3 SOLUTION RESPIRATORY (INHALATION) at 06:07

## 2022-07-10 RX ADMIN — INSULIN DETEMIR 15 UNITS: 100 INJECTION, SOLUTION SUBCUTANEOUS at 10:07

## 2022-07-10 RX ADMIN — GUAIFENESIN AND DEXTROMETHORPHAN HYDROBROMIDE 1 TABLET: 600; 30 TABLET, EXTENDED RELEASE ORAL at 11:07

## 2022-07-10 RX ADMIN — HEPARIN SODIUM 5000 UNITS: 5000 INJECTION, SOLUTION INTRAVENOUS; SUBCUTANEOUS at 05:07

## 2022-07-10 RX ADMIN — FLUTICASONE FUROATE AND VILANTEROL TRIFENATATE 1 PUFF: 100; 25 POWDER RESPIRATORY (INHALATION) at 08:07

## 2022-07-10 RX ADMIN — HEPARIN SODIUM 5000 UNITS: 5000 INJECTION, SOLUTION INTRAVENOUS; SUBCUTANEOUS at 09:07

## 2022-07-10 RX ADMIN — HYDRALAZINE HYDROCHLORIDE 100 MG: 25 TABLET, FILM COATED ORAL at 09:07

## 2022-07-10 RX ADMIN — AZITHROMYCIN MONOHYDRATE 250 MG: 250 TABLET ORAL at 09:07

## 2022-07-10 RX ADMIN — MUPIROCIN: 20 OINTMENT TOPICAL at 09:07

## 2022-07-10 RX ADMIN — INSULIN DETEMIR 20 UNITS: 100 INJECTION, SOLUTION SUBCUTANEOUS at 09:07

## 2022-07-10 RX ADMIN — ASPIRIN 81 MG: 81 TABLET, CHEWABLE ORAL at 09:07

## 2022-07-10 RX ADMIN — SODIUM CHLORIDE 30 MG/ML INHALATION SOLUTION 4 ML: 30 SOLUTION INHALANT at 05:07

## 2022-07-10 RX ADMIN — CEFEPIME 2 G: 2 INJECTION, POWDER, FOR SOLUTION INTRAVENOUS at 10:07

## 2022-07-10 RX ADMIN — INSULIN ASPART 4 UNITS: 100 INJECTION, SOLUTION INTRAVENOUS; SUBCUTANEOUS at 11:07

## 2022-07-10 NOTE — PLAN OF CARE
Pt received on 1LPM NC. SPO2 99%. Placed pt on RA. No distress noted at time.                                   Neb given. No adverse reactions noted. Pt is unable to cough up sputum at this time.

## 2022-07-10 NOTE — PROGRESS NOTES
Pulmonary & Critical Care Medicine Progress Note    Subjective:   - feeling improved from yesterday, O2 in place but able to be weaned to 1L with sat 97%  - chest CT performed: notable for new LLL consolidation/effusion, improvement from prior on R side  - POCUS with large effusion on left  - pending labs and cultures      Vital Signs:   Vitals:    07/10/22 1729   BP:    Pulse: 75   Resp: 20   Temp:        Fluid Balance:     Intake/Output Summary (Last 24 hours) at 7/10/2022 1803  Last data filed at 7/10/2022 1700  Gross per 24 hour   Intake 991 ml   Output 1500 ml   Net -509 ml       Physical Exam:   General: sitting up in chair eating, brighter affect than yesterday and more comfortable appearing  HEENT: NC/AT, PERRL, EOMI, no scleral icterus, moist mucous membranes, no pharyngeal erythema or exudates, normal dentition.  Neck: Supple without JVD, trachea midline, no thyromegaly  Cardiac: S1S2, RRR, no murmurs/rubs/gallops  Resp: Normal inspection. Symmetric chest rise. Normal palpation. Good inspiratory effort, more stamina. Squeaky rhonchi mid and lower left posterior lung fields, air movement b/L, r side with basilar crackles  Abd: Soft, NT/ND, +BS, no HSM, no palpable masses, no rebound tenderness.  Lymphatic: No palpable supraclavicular/cervical/axillary LAD.   Ext: No edema, no cyanosis, no clubbing  Skin: Warm and dry, no rashes, no lesions, no jaundice  Neuro: AAOx3, CN II-XII grossly intact, no focal deficits, good sensation bilaterally, grossly normal strength and tone.  Psych: Appropriate mood and affect, cooperative & interactive    Laboratory Studies:   No results for input(s): PH, PCO2, PO2, HCO3, POCSATURATED, BE in the last 24 hours.  Recent Labs   Lab 07/10/22  0237   WBC 12.28   RBC 2.79*   HGB 8.0*   HCT 24.3*      MCV 87   MCH 28.7   MCHC 32.9     Recent Labs   Lab 07/10/22  1321   *   K 4.0   CL 87*   CO2 26   BUN 93*   CREATININE 2.6*       Microbiology Data:   Microbiology  Results (last 7 days)     Procedure Component Value Units Date/Time    Fungus culture [[id number]]     Order Status: No result Specimen: Respiratory from Sputum     Blood culture [[id number]] Collected: 07/09/22 0832    Order Status: Completed Specimen: Blood Updated: 07/10/22 1022     Blood Culture, Routine No Growth to date      No Growth to date    Blood culture [[id number]] Collected: 07/09/22 0832    Order Status: Completed Specimen: Blood Updated: 07/10/22 1022     Blood Culture, Routine No Growth to date      No Growth to date    Culture, Respiratory with Gram Stain [[id number]] Collected: 07/09/22 1500    Order Status: Completed Specimen: Respiratory from Sputum Updated: 07/10/22 0119     Gram Stain (Respiratory) <10 epithelial cells per low power field.      Gram Stain (Respiratory) Few WBC's     Gram Stain (Respiratory) Moderate Gram negative rods     Gram Stain (Respiratory) Rare Gram positive cocci    AFB Culture & Smear [[id number]]     Order Status: No result Specimen: Respiratory from Sputum, Expectorated     Culture, Respiratory with Gram Stain [[id number]] Collected: 07/09/22 1500    Order Status: Canceled Specimen: Respiratory from Sputum            Chest Imaging:   No new imaging.     Infusions:        Scheduled Medications:    albuterol-ipratropium  3 mL Nebulization Q6H WAKE    aspirin  81 mg Oral Daily    azithromycin  250 mg Oral Daily    carvediloL  6.25 mg Oral BID WM    ceFEPime (MAXIPIME) IVPB  2 g Intravenous Q24H    dextromethorphan-guaiFENesin  mg  1 tablet Oral BID    fluticasone furoate-vilanteroL  1 puff Inhalation Daily    fluticasone propionate  2 spray Each Nostril Daily    heparin (porcine)  5,000 Units Subcutaneous Q8H    hydrALAZINE  100 mg Oral BID    insulin aspart U-100  2 Units Subcutaneous TIDWM    insulin detemir U-100  20 Units Subcutaneous BID    lactobacillus acidophilus & bulgar  1 packet Oral BID    montelukast  10 mg Oral QHS    mupirocin   Nasal BID     polyethylene glycol  17 g Oral BID    pravastatin  40 mg Oral Daily       PRN Medications:   acetaminophen, albuterol, albuterol sulfate, benzonatate, bisacodyL, dextrose 10%, dextrose 10%, diphenhydrAMINE, glucagon (human recombinant), glucose, glucose, HYDROcodone-acetaminophen, insulin aspart U-100, melatonin, mineral oil, naloxone, nitroGLYCERIN, ondansetron, prochlorperazine, simethicone, sodium chloride 0.9%, sodium chloride 3%, Pharmacy to dose Vancomycin consult **AND** vancomycin - pharmacy to dose    Assessment & Plan:   Patient Active Problem List   Diagnosis    History of nonmelanoma skin cancer    LBBB (left bundle branch block)    Nontoxic multinodular goiter    Meningioma    Asthma, chronic    Biventricular ICD (implantable cardioverter-defibrillator) in place    Dyspnea    Tremor    Hypertension associated with diabetes    History of breast cancer    Adrenal cortical nodule: repeat CT 6/2016    Calcification of aorta    Diabetic polyneuropathy associated with type 2 diabetes mellitus    Osteoporosis    KHOA (obstructive sleep apnea)    Iron deficiency anemia    Chemotherapy-induced neuropathy    Hypertensive retinopathy, bilateral    Multiple lung nodules    Chronic obstructive pulmonary disease with acute exacerbation    Mixed conductive and sensorineural hearing loss    Weakness    Cardiomyopathy, ischemic    Metabolic bone disease    Seasonal allergies    Hyperlipidemia associated with type 2 diabetes mellitus    Mild nonproliferative retinopathy due to secondary diabetes    Macular edema due to secondary diabetes    Moderate asthma    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, with long-term current use of insulin    Chronic combined systolic and diastolic heart failure    Obstructive sleep apnea    Personal history of COVID-19    Current mild episode of major depressive disorder without  prior episode    Microalbuminuria due to type 2 diabetes mellitus    Urinary retention    Hyponatremia    ABPA (allergic bronchopulmonary aspergillosis)    Mycobacterium avium complex    Diet-controlled diabetes mellitus    History of non anemic vitamin B12 deficiency    Vitreous hemorrhage, left    Retinal macroaneurysm of left eye    Acute on chronic combined systolic and diastolic CHF (congestive heart failure)    COVID-19 virus infection    Essential hypertension    Type 2 diabetes mellitus, with long-term current use of insulin    Acute hypoxemic respiratory failure    UTI (urinary tract infection)    ACP (advance care planning)    HAP (hospital-acquired pneumonia)    CKD (chronic kidney disease) stage 4, GFR 15-29 ml/min     81YO lady with CKD4, HFrEF (45%), DMII, prior breast ca (dx 1998, finished tx 2008, chemo only, no radiation), severe persistent asthma, COPD (last PFTs 10/2020, pt of Dr. Maki's), known MAC (4/11/22) not under tx and known ABPA for which she did not tolerate tx (vori for sputum cx 3/9/22) here for new respiratory failure iso having been discharged 1 week ago after admission for covid-19 PNA.     CT chest/POCUS with large left-sided pleural effusion. High suspicion for infection; unclear that prior MAC/aspergillus are contributory process but appreciate that cultures are pending. Aspergillosis as a secondary infection post-covid is also well-documented. WBC slightly elevated today but still WNL, pt feeling much improved and with much improved O2 recs.    - will sample left side with thoracentesis in AM - discussed procedure with pt and daughter and consent signed  - agree with vanc/cefipime/doxy pending cultures  - AFB sputum cx ordered (NB for NTM dx, low suspicion for TB, she does not need TB isolation)  - fungal cultures ordered  - pending urine legionella, histo and strep antigens, serum galactomannan and fungitell  - can continue home meds or formulary equivalents as  tolerated (on Spiriva, Wixela (250-50), Albuterol, Duonebs, Singulair, Flonase)  - continue to wean O2 as tolerated       Thank you for involving us in the care of this patient. We will continue to follow along.  Corin Villavicencio MD  [facility] PCCM Fellow, PGY4  [facility]

## 2022-07-10 NOTE — ASSESSMENT & PLAN NOTE
Mycobacterium avium complex      -Followed by ID, Dr. Amaya; was placed on Voriconazole therapy but unable to tolerate  - With recent pseudomonas in sputum. Completed treated with Cefepime for 5 days on the previous admission  - Is admitted for pneumonia with worsening CXR  - Consulted Pulmonary

## 2022-07-10 NOTE — SUBJECTIVE & OBJECTIVE
Interval History: Reports SOB better today, but complains of nausea. Still with cough. On 3L NC. Cultures pending. CT chest pending. Pulmonology on board    Review of Systems   Constitutional:  Positive for appetite change.   HENT:  Positive for congestion. Negative for sore throat.    Respiratory:  Positive for cough and shortness of breath. Negative for wheezing.    Cardiovascular:  Negative for chest pain, palpitations and leg swelling.   Gastrointestinal:  Positive for nausea. Negative for abdominal pain and vomiting.   Genitourinary:  Negative for flank pain.   Musculoskeletal:  Negative for back pain.   Neurological:  Negative for dizziness and headaches.   Hematological:  Bruises/bleeds easily.   Objective:     Vital Signs (Most Recent):  Temp: 96.9 °F (36.1 °C) (07/10/22 0910)  Pulse: 75 (07/10/22 0910)  Resp: 18 (07/10/22 0910)  BP: (!) 163/68 (07/10/22 0910)  SpO2: 100 % (07/10/22 0910)   Vital Signs (24h Range):  Temp:  [96.1 °F (35.6 °C)-97.3 °F (36.3 °C)] 96.9 °F (36.1 °C)  Pulse:  [64-92] 75  Resp:  [16-20] 18  SpO2:  [94 %-100 %] 100 %  BP: (137-166)/(66-73) 163/68     Weight: 59.5 kg (131 lb 2.8 oz)  Body mass index is 23.24 kg/m².    Intake/Output Summary (Last 24 hours) at 7/10/2022 1019  Last data filed at 7/10/2022 0700  Gross per 24 hour   Intake 250 ml   Output 950 ml   Net -700 ml      Physical Exam  Constitutional:       General: She is not in acute distress.     Appearance: She is not diaphoretic.   HENT:      Head: Normocephalic and atraumatic.      Nose: Congestion present. No rhinorrhea.      Mouth/Throat:      Mouth: Mucous membranes are moist.   Eyes:      Conjunctiva/sclera: Conjunctivae normal.   Cardiovascular:      Rate and Rhythm: Normal rate.      Heart sounds: No murmur heard.  Pulmonary:      Effort: Pulmonary effort is normal.      Breath sounds: Decreased breath sounds (left) present. No wheezing, rhonchi or rales.   Chest:      Chest wall: No tenderness.   Abdominal:       General: Bowel sounds are normal.      Palpations: Abdomen is soft.      Tenderness: There is no abdominal tenderness.   Musculoskeletal:      Cervical back: Neck supple.      Right lower leg: No edema.      Left lower leg: No edema.   Skin:     General: Skin is warm and dry.      Findings: Bruising present.   Neurological:      Mental Status: She is alert and oriented to person, place, and time.      Motor: Weakness present.   Psychiatric:         Mood and Affect: Mood normal.         Thought Content: Thought content normal.       Significant Labs: All pertinent labs within the past 24 hours have been reviewed.    Significant Imaging: I have reviewed all pertinent imaging results/findings within the past 24 hours.

## 2022-07-10 NOTE — ASSESSMENT & PLAN NOTE
Patient is identified as having Combined Systolic and Diastolic heart failure that is Chronic. CHF is currently controlled. Latest ECHO performed and demonstrates- Results for orders placed during the hospital encounter of 06/20/22    Echo 6/1/22    Interpretation Summary  · The left ventricle is normal in size with concentric hypertrophy and normal systolic function.  · The estimated ejection fraction is 55%.  · Grade II left ventricular diastolic dysfunction.  · Normal right ventricular size with normal right ventricular systolic function.  · Severe left atrial enlargement.  · Mild mitral regurgitation.  · Mild to moderate tricuspid regurgitation.  · Intermediate central venous pressure (8 mmHg).  · The estimated PA systolic pressure is 56 mmHg.  · There is pulmonary hypertension.  · There is a left pleural effusion.  . Hold Furosemide and monitor clinical status closely. Monitor on telemetry. Patient is off CHF pathway.  Monitor strict Is&Os and daily weights.  Place on fluid restriction of 1.5 L. Continue to stress to patient importance of self efficacy and  on diet for CHF. Last BNP reviewed- and noted below   Recent Labs   Lab 07/08/22  1735   *         Intake/Output Summary (Last 24 hours) at 7/10/2022 1027  Last data filed at 7/10/2022 0700  Gross per 24 hour   Intake 250 ml   Output 950 ml   Net -700 ml

## 2022-07-10 NOTE — PT/OT/SLP EVAL
Physical Therapy Evaluation    Patient Name:  Myesha Quinones   MRN:  [id number]    Recommendations:     Discharge Recommendations:  home health PT, home health OT   Discharge Equipment Recommendations: none   Barriers to discharge: None    Assessment:     Myesha Quinones is a 82 y.o. female admitted with a medical diagnosis of Acute hypoxemic respiratory failure.  She presents with the following impairments/functional limitations:  weakness, impaired endurance, impaired self care skills, impaired functional mobilty, gait instability, impaired balance, decreased coordination, impaired cardiopulmonary response to activity Patient will benefit from acute PT services while in hospital to maximize functional independence; will recommend home health PT/OT upon d/c.    Rehab Prognosis: Good; patient would benefit from acute skilled PT services to address these deficits and reach maximum level of function.    Recent Surgery: * No surgery found *      Plan:     During this hospitalization, patient to be seen   to address the identified rehab impairments via gait training, therapeutic activities, therapeutic exercises, neuromuscular re-education and progress toward the following goals:    · Plan of Care Expires:  08/09/22    Subjective     Chief Complaint: c/o coughing/SOB, feeling weak  Patient/Family Comments/goals: return to PLOF  Pain/Comfort:  · No c/o pain throughout session    Patients cultural, spiritual, Orthodox conflicts given the current situation: no    Living Environment:  Patient lives alone in [facility] with no TAMEKA, WIS  Prior to admission, patients level of function was Verna with ADLs, amb with RW; pt orders her groceries, and hires someone to do the cleaning; dtr and 2 sons check on patient and can assist as needed.  Equipment used at home: walker, rolling, cane, straight, shower chair.  Upon discharge, patient will have assistance from family as needed.    Objective:     Communicated with nurse prior  to session.  Patient found HOB elevated with peripheral IV, lauren catheter  upon PT entry to room.    General Precautions: Standard, fall   Orthopedic Precautions:N/A   Braces:    Respiratory Status: Nasal cannula, flow 4 L/min    Exams:  · Cognitive Exam:  Patient is oriented to Person, Place, Time, Situation and follows multistep commands; patient [place]  · Gross Motor Coordination:  WFL  · Postural Exam:  Patient presented with the following abnormalities:    · -       Rounded shoulders  · -       Forward head  · -       Kyphosis  · Sensation: intact to lt touch; endorses neuropathy in B feet  · BLE ROM: WFL  · BLE Strength: 4- to 4 grossly     Functional Mobility:  · Bed Mobility:     · Scooting: stand by assistance  · Supine to Sit: stand by assistance  · Sit to Supine: stand by assistance  · Transfers:     · Sit to Stand:  contact guard assistance with rolling walker  · Bed to Chair: contact guard assistance with  rolling walker  using  Step Transfer  · Gait: Patient amb 75ft and 40ft using RW with SBA/CGA; VCs for erect stance and keeping RW within close proximity  · Balance: sit~good, stand fair+, amb ~fair    Therapeutic Activities and Exercises:   Patient performed bed mobility -sitting on EOB; 3-5 reps BUE/LE AROM ex; sit to stand with CGA usign RW; pt able to amb ~75ft using RW with SBA/CGA initially on 4L but O2 sats declined from 97% at rest to 87% during amb; instructed in deep pursed lip breathing during standing rest to increase O2 sats; increased to 5L for remaining 25% of first amb trial with O2 sats increasing to 88%, then 90% after resting 1-2 min; returned O2 to 4L and O2 sats 92% at rest; pt amb 2nd trial of 40ft with O2 at 4L and O2 sats remained at 92%; decreased to 90% following rest 1-2 min; patient sat in chair; instructed in daily activity; sitting OOB and performing intermittent BUE/LE AROM ex; discussed home situation- dtr could stay with the patient as needed post hospital  stay.    AM-PAC 6 CLICK MOBILITY  Total Score:  18    Patient left up in chair with all lines intact, call button in reach and nurse notified.    GOALS:   Multidisciplinary Problems     Physical Therapy Goals        Problem: Physical Therapy    Goal Priority Disciplines Outcome Goal Variances Interventions   Physical Therapy Goal     PT, PT/OT Ongoing, Progressing     Description: Goals to be met by: 2022     Patient will increase functional independence with mobility by performin. Supine to sit with Modified [place]  2. Sit to supine with Modified [place]  3. Sit to stand transfer with Supervision using Rolling Walker  4. Bed to chair transfer with Supervision using Rolling Walker  5. Gait  x 100 feet with Supervision using Rolling Walker.   6. Lower extremity exercise program x10 reps with independence                     History:     Past Medical History:   Diagnosis Date    Acute hypoxemic respiratory failure 2019    Acute right-sided thoracic back pain 2019    Allergy     Asthma     Basal cell carcinoma     left forehead    Basal cell carcinoma     left nose    Basal cell carcinoma 2015    right nose    Basal cell carcinoma 2015    left lower post neck    Basal cell carcinoma 2019    left ant scalp     Bilateral renal cysts     Breast cancer     CAD (coronary artery disease)     Cardiomyopathy     Cardiomyopathy, ischemic     Cataract     CHF (congestive heart failure)     CKD (chronic kidney disease) stage 4, GFR 15-29 ml/min     Colon polyp     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, with long-term current use of insulin 2018    COPD (chronic obstructive pulmonary disease)     COPD exacerbation 2018    Current mild episode of major depressive disorder without prior episode 2022    Defibrillator discharge     Diabetes mellitus     Diabetes  mellitus type II     Diabetes with neurologic complications     Edema     Goiter     MNG    Hematuria, unspecified     HX: breast cancer     Hyperlipidemia     Hypertension     Hypocalcemia     Hypokalemia     Hyponatremia     Hyponatremia 04/02/2022    Iron deficiency anemia 05/16/2017    Iron deficiency anemia     Left kidney mass     Meningioma     Microalbuminuria due to type 2 diabetes mellitus 01/26/2022    Osteoporosis, postmenopausal     Pneumonia 12/08/2019    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    Proteinuria 01/21/2019    Pseudomonas pneumonia     Skin cancer     s/p excision    Sleep apnea     CPAP    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/2015    mid forehead    Unspecified vitamin D deficiency     UTI (urinary tract infection) 04/02/2022    Ventricular tachycardia     Vitamin B12 deficiency     Vitamin D deficiency disease        Past Surgical History:   Procedure Laterality Date    BASAL CELL CARCINOMA EXCISION      posterior neck and nose    BREAST BIOPSY      BREAST CYST EXCISION Left     BREAST SURGERY      CARDIAC DEFIBRILLATOR PLACEMENT      x 2    CATARACT EXTRACTION W/  INTRAOCULAR LENS IMPLANT Bilateral     CHOLECYSTECTOMY      COLONOSCOPY N/A 11/5/2019    Procedure: COLONOSCOPY;  Surgeon: Boaz Botello MD;  Location: [facility] ENDO ([address] [place] [address]);  Service: Endoscopy;  Laterality: N/A;  AICD - [employer] -     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    FRACTURE SURGERY      left elbow and wrist as a child    HYSTERECTOMY      MASTECTOMY Right     REPLACEMENT OF IMPLANTABLE CARDIOVERTER-DEFIBRILLATOR (ICD) GENERATOR N/A 12/17/2018    Procedure: REPLACEMENT, ICD GENERATOR;  Surgeon: Jan Mckeon MD;  Location: [facility] EP LAB;  Service: Cardiology;  Laterality: N/A;  DONNA, CRT-D gen change, MDT, MAC, SK, 3 Prep    REVISION OF SKIN POCKET FOR CARDIOVERTER-DEFIBRILLATOR  12/17/2018    Procedure: Revision, Skin Pocket, For Cardioverter-Defibrillator;  Surgeon:  Jan Mckeon MD;  Location: [facility] EP LAB;  Service: Cardiology;;    SQUAMOUS CELL CARCINOMA EXCISION      remved from forehead    TONSILLECTOMY         Time Tracking:     PT Received On: 07/10/22  PT Start Time: 1450     PT Stop Time: 1535  PT Total Time (min): 45 min     Billable Minutes: Evaluation 15, Gait Training 15 and Therapeutic Activity 15      07/09/2022

## 2022-07-10 NOTE — PLAN OF CARE
Problem: Adult Inpatient Plan of Care  Goal: Plan of Care Review  Outcome: Ongoing, Progressing   Patient is alert, oriented X4. Care plan explained to patient, she verbalized understanding.     On 3L of nasal cannula, O2 sat maintain 96%, no respiratory distress noted. On cardiac monitor, running paced rhythm.     Deny nausea/vomiting/diarrhea. Constipation managed with scheduled Miralax. No pain complaint. Due medications given. Tylenol given for headache this morning.     Maintain fall risk precaution, bed in lowest position, bed alarm on. Call light/personal items in reach. Instructed patient call for help as needed. Will continue to monitor.

## 2022-07-10 NOTE — ASSESSMENT & PLAN NOTE
Chronic, controlled.  Latest blood pressure and vitals reviewed-   Temp:  [96.1 °F (35.6 °C)-97.3 °F (36.3 °C)]   Pulse:  [64-92]   Resp:  [16-20]   BP: (137-166)/(66-73)   SpO2:  [94 %-100 %] .   Home meds for hypertension were reviewed and noted below. Hospital anti-hypertensive changes were made as shown below.  Hypertension Medications             carvediloL (COREG) 25 MG tablet TAKE 2 TABLETS (50 MG TOTAL) BY MOUTH 2 (TWO) TIMES DAILY.    furosemide (LASIX) 40 MG tablet Take 1 tablet (40 mg total) by mouth every 8 (eight) hours as needed (swelling).    hydrALAZINE (APRESOLINE) 100 MG tablet Take 1 tablet (100 mg total) by mouth 2 (two) times daily.    nitroGLYCERIN (NITROSTAT) 0.4 MG SL tablet Place 0.4 mg under the tongue every 5 (five) minutes as needed for Chest pain.     valsartan (DIOVAN) 80 MG tablet Take 1 tablet (80 mg total) by mouth 2 (two) times daily.        Will utilize p.r.n. blood pressure medication only if patient's blood pressure greater than  180/110 and she develops symptoms such as worsening chest pain or shortness of breath.    -Continues to Hydralazine, Carvedilol  - Hold valsartan due to elevated Cr. Will adjust as needed

## 2022-07-10 NOTE — ASSESSMENT & PLAN NOTE
See HAP  Will wean O2 as tolerated  Monitor vol status  Repeat CXR in AM - worsening CXR  Scheduled nebs q6hA  Added steroids - Steroids discontinued due to no wheezing/SOB on exam and hx uncontrolled blood glucose on steroids   ?covid hauler   - Pulmonology consulted

## 2022-07-10 NOTE — PLAN OF CARE
Problem: Occupational Therapy  Goal: Occupational Therapy Goal  Description: Goals to be met by: 8/10/22     Patient will increase functional independence with ADLs by performing:    LE Dressing with Modified [place].  Grooming while standing with Modified [place].  Toileting from toilet with Modified [place] for hygiene and clothing management.   Supine to sit with Modified [place].  Step transfer with Modified [place]  Toilet transfer to toilet with Modified [place].  Increased functional strength to WFL for self care skills and functional mobility.  Upper extremity exercise program x10 reps per handout, with independence.    Outcome: Ongoing, Progressing       Pt would benefit from cont OT services in order to maximize functional independence. Recommending HHOT/PT at d/c .

## 2022-07-10 NOTE — CARE UPDATE
Patient's nasal cannula was under her chin. SpO2 on RA was 89 percent. Placed patient back on 3L where her SpO2 showed 96 percent. Weaned to 2L after treatment.

## 2022-07-10 NOTE — PLAN OF CARE
Problem: Physical Therapy  Goal: Physical Therapy Goal  Description: Goals to be met by: 2022     Patient will increase functional independence with mobility by performin. Supine to sit with Modified [place]  2. Sit to supine with Modified [place]  3. Sit to stand transfer with Supervision using Rolling Walker  4. Bed to chair transfer with Supervision using Rolling Walker  5. Gait  x 100 feet with Supervision using Rolling Walker.   6. Lower extremity exercise program x10 reps with independence    Outcome: Ongoing, Progressing   Patient evaluated; full report to follow; pt able to amb ~75ft using RW with SBA/CGA initially on 4L but O2 sats declined from 97% at rest to 87% during amb; increased to 5L for remaining 25% of first amb trial with O2 sats increasing to 88%, then 90% after resting 1-2 min; returned O2 to 4L and O2 sats 92% at rest; pt amb 2nd trial of 40ft with O2 at 4L and O2 sats remained at 92%; decreased to 90% following rest 1-2 min; will benefit from acute PT services while in hospital to maximize functional independence; will recommend home health PT/OT upon d/c.

## 2022-07-10 NOTE — ASSESSMENT & PLAN NOTE
BSA (Cefepime and Vancomycin)  Added Azithro to cover atypicals  If MRSA screen (-) can DC Vanc  RC  Trend PCT  Added steroids for Rx of inPt PNA with hypoxia. Steroids discontinued  Consulted Pulmonary due to worsening CXR   - Cultures pending  - CT chest pending

## 2022-07-10 NOTE — PT/OT/SLP PROGRESS
Occupational Therapy  Visit Attempt     Patient Name:  Myesha Quinones   MRN:  [id number]    Patient not seen today secondary to Testing/imaging (xray/CT/MRI). Will follow-up as available.    7/10/2022

## 2022-07-10 NOTE — ASSESSMENT & PLAN NOTE
Basal + ISS  She's on steroids and her dtr stated that the last time she was on steroids she glu readings > 800.   Will monitor adjust based on trends  A1C 8.0 (7/8)  - Steroids discontinued  - AG elevated  - Added prandial insulin  - Repeat BMP pending

## 2022-07-10 NOTE — CONSULTS
Pulmonary & Critical Care Medicine Consult Note    Reason for Consultation:  New hypoxia req O2, hx of ABPA, MAC, prior covid, HF    HPI:     81YO lady with CKD4, HFrEF (45%), DMII, prior breast ca (dx 1998, finished tx 2008, no radiation), severe persistent asthma, COPD (last PFTs 10/2020, pt of Dr. Maki's), known MAC (4/11/22) not under tx and known ABPA for which she did not tolerate tx (vori for sputum cx 3/9/22) here for new respiratory failure iso having been discharged 1 week ago after admission for covid-19 PNA.    Was discharged on RA and feeling not quite at baseline but stable; gradually felt worse over the week, malaise, SOB, and came in Friday. Found to be hypoxic to 80s, started on O2 and broad-spectrum antibiotics. CXR with new left-sided infiltrate since June, s/f infectious process. Feeling much better today and able to wean O2 from max of 4L to 3L with sats 100%.     PE notable for squeaky rhonchi in left lower posterior field with good air movement nonetheless, right side CTA. Without significant LE edema, afebrile, HR reg. Asserted some mild chest pain prior to admission, resolved. Cultures pending, VIRGILIO. WBC mildly elevated, mild left shift. Hyponatremic.      Past Medical History:   Diagnosis Date    Acute hypoxemic respiratory failure 12/19/2019    Acute right-sided thoracic back pain 12/09/2019    Allergy     Asthma     Basal cell carcinoma     left forehead    Basal cell carcinoma     left nose    Basal cell carcinoma 05/20/2015    right nose    Basal cell carcinoma 12/22/2015    left lower post neck    Basal cell carcinoma 12/03/2019    left ant scalp     Bilateral renal cysts     Breast cancer     CAD (coronary artery disease)     Cardiomyopathy     Cardiomyopathy, ischemic     Cataract     CHF (congestive heart failure)     CKD (chronic kidney disease) stage 4, GFR 15-29 ml/min     Colon polyp 2011    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, with long-term current use of insulin 02/22/2018    COPD (chronic obstructive pulmonary disease)     COPD exacerbation 04/08/2018    Current mild episode of major depressive disorder without prior episode 01/25/2022    Defibrillator discharge     Diabetes mellitus     Diabetes mellitus type II     Diabetes with neurologic complications     Edema     Goiter     MNG    Hematuria, unspecified     HX: breast cancer     Hyperlipidemia     Hypertension     Hypocalcemia     Hypokalemia     Hyponatremia     Hyponatremia 04/02/2022    Iron deficiency anemia 05/16/2017    Iron deficiency anemia     Left kidney mass     Meningioma     Microalbuminuria due to type 2 diabetes mellitus 01/26/2022    Osteoporosis, postmenopausal     Pneumonia 12/08/2019    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    Proteinuria 01/21/2019    Pseudomonas pneumonia     Skin cancer     s/p excision    Sleep apnea     CPAP    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/2015    mid forehead    Unspecified vitamin D deficiency     UTI (urinary tract infection) 04/02/2022    Ventricular tachycardia     Vitamin B12 deficiency     Vitamin D deficiency disease      Past Surgical History:   Procedure Laterality Date    BASAL CELL CARCINOMA EXCISION      posterior neck and nose    BREAST BIOPSY      BREAST CYST EXCISION Left     BREAST SURGERY      CARDIAC DEFIBRILLATOR PLACEMENT      x 2    CATARACT EXTRACTION W/  INTRAOCULAR LENS IMPLANT Bilateral     CHOLECYSTECTOMY      COLONOSCOPY N/A 11/5/2019    Procedure: COLONOSCOPY;  Surgeon: Boaz Botello MD;  Location: [facility] ([address] [place] [address]);  Service: Endoscopy;  Laterality: N/A;  AICD - [employer] -     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    FRACTURE SURGERY      left elbow and wrist as a child    HYSTERECTOMY      MASTECTOMY Right     REPLACEMENT OF IMPLANTABLE CARDIOVERTER-DEFIBRILLATOR (ICD) GENERATOR N/A  12/17/2018    Procedure: REPLACEMENT, ICD GENERATOR;  Surgeon: Jan Mckeon MD;  Location: [facility] EP LAB;  Service: Cardiology;  Laterality: N/A;  DONNA, CRT-D gen change, MDT, MAC, SK, 3 Prep    REVISION OF SKIN POCKET FOR CARDIOVERTER-DEFIBRILLATOR  12/17/2018    Procedure: Revision, Skin Pocket, For Cardioverter-Defibrillator;  Surgeon: Jan Mckeon MD;  Location: [facility] EP LAB;  Service: Cardiology;;    SQUAMOUS CELL CARCINOMA EXCISION      remved from forehead    TONSILLECTOMY       Social History:   Social History     Socioeconomic History    Marital status:    Occupational History    Occupation: Homemaker     Employer: OTHER   Tobacco Use    Smoking status: Never Smoker    Smokeless tobacco: Never Used   Substance and Sexual Activity    Alcohol use: No     Alcohol/week: 0.0 standard drinks    Drug use: No    Sexual activity: Yes     Partners: Male   Other Topics Concern    Are you pregnant or think you may be? No    Breast-feeding No     Social Determinants of Health     Financial Resource Strain: Low Risk     Difficulty of Paying Living Expenses: Not hard at all   Food Insecurity: No Food Insecurity    Worried About Running Out of Food in the Last Year: Never true    Ran Out of Food in the Last Year: Never true   Transportation Needs: No Transportation Needs    Lack of Transportation (Medical): No    Lack of Transportation (Non-Medical): No   Housing Stability: Low Risk     Unable to Pay for Housing in the Last Year: No    Number of Places Lived in the Last Year: 1    Unstable Housing in the Last Year: No     Family History   Problem Relation Age of Onset    Diabetes Father     Heart disease Father     Diabetes Sister     Heart disease Sister     Diabetes Brother     Heart disease Brother     Hypertension Brother     Diabetes Brother     Heart disease Brother     Hypertension Brother     Diabetes Brother     Heart disease Brother     Cancer Brother         colon     Diabetes Brother     Cancer Son         skin    Diabetes Son         prediabetes    Diabetes Daughter         prediabetes    Cancer Daughter         melanoma    Obesity Daughter     Melanoma Daughter     Diabetes Son     Asthma Mother     Hypertension Mother     Stroke Mother     No Known Problems Maternal Grandmother     No Known Problems Maternal Grandfather     No Known Problems Paternal Grandmother     No Known Problems Paternal Grandfather     Amblyopia Neg Hx     Blindness Neg Hx     Cataracts Neg Hx     Glaucoma Neg Hx     Macular degeneration Neg Hx     Retinal detachment Neg Hx     Strabismus Neg Hx     Thyroid disease Neg Hx      Drug Allergies:   Review of patient's allergies indicates:   Allergen Reactions    Iodine and iodide containing products Hives    Nifedipine      weakness     Current Infusions:    Scheduled Medications:     albuterol-ipratropium  3 mL Nebulization Q6H WAKE    aspirin  81 mg Oral Daily    azithromycin  250 mg Oral Daily    carvediloL  6.25 mg Oral BID WM    ceFEPime (MAXIPIME) IVPB  2 g Intravenous Q24H    diphenhydrAMINE  25 mg Intravenous Once    fluticasone furoate-vilanteroL  1 puff Inhalation Daily    fluticasone propionate  2 spray Each Nostril Daily    [START ON 7/10/2022] furosemide  40 mg Oral BID    heparin (porcine)  5,000 Units Subcutaneous Q8H    hydrALAZINE  100 mg Oral BID    insulin detemir U-100  20 Units Subcutaneous BID    lactobacillus acidophilus & bulgar  1 packet Oral BID    montelukast  10 mg Oral QHS    mupirocin   Nasal BID    polyethylene glycol  17 g Oral BID    pravastatin  40 mg Oral Daily     PRN Medications:   acetaminophen, albuterol, benzonatate, bisacodyL, dextrose 10%, dextrose 10%, diphenhydrAMINE, glucagon (human recombinant), glucose, glucose, HYDROcodone-acetaminophen, insulin aspart U-100, melatonin, mineral oil, naloxone, nitroGLYCERIN, ondansetron, prochlorperazine, simethicone, sodium chloride  0.9%, Pharmacy to dose Vancomycin consult **AND** vancomycin - pharmacy to dose    Review of Systems:   A comprehensive 12-point review of systems was performed, and is negative except for those items mentioned above in the HPI section of this note.     Vital Signs:    Vitals:    07/09/22 2000   BP:    Pulse: 92   Resp:    Temp:        Fluid Balance:     Intake/Output Summary (Last 24 hours) at 7/9/2022 2153  Last data filed at 7/9/2022 1400  Gross per 24 hour   Intake 135 ml   Output 1250 ml   Net -1115 ml       Physical Exam:   General: NAD  HEENT: NC/AT, PERRL, EOMI, no scleral icterus, moist mucous membranes, no pharyngeal erythema or exudates, normal dentition.  Neck: Supple without JVD, trachea midline, no thyromegaly  Cardiac: S1S2, RRR, no murmurs/rubs/gallops  Resp: Normal inspection. Symmetric chest rise. Normal palpation. Good inspiratory effort, tires with repeated examination. Squeaky rhonchi mid and lower left posterior lung fields, air movement b/L, no sig abn on right lung  Abd: Soft, NT/ND, +BS, no HSM, no palpable masses, no rebound tenderness.  Lymphatic: No palpable supraclavicular/cervical/axillary LAD.   Ext: No edema, no cyanosis, no clubbing, 2+ pulses present, brisk capillary refill present.  Skin: Warm and dry, no rashes, no lesions, no jaundice  Neuro: AAOx3, CN II-XII grossly intact, no focal deficits, good sensation bilaterally, grossly normal strength and tone.  Psych: Appropriate mood and affect, cooperative & interactive    Personal Review and Summary of Prior Diagnostics    Laboratory Studies:   No results for input(s): PH, PCO2, PO2, HCO3, POCSATURATED, BE in the last 24 hours.  Recent Labs   Lab 07/09/22 0323   WBC 10.38   RBC 3.02*   HGB 8.6*   HCT 26.1*      MCV 86   MCH 28.5   MCHC 33.0     Recent Labs   Lab 07/09/22 0323   *   K 4.0   CL 88*   CO2 25   BUN 78*   CREATININE 2.5*   MG 2.5       Microbiology Data:   Microbiology Results (last 7 days)     Procedure  Component Value Units Date/Time    AFB Culture & Smear [[id number]]     Order Status: No result Specimen: Respiratory from Sputum, Expectorated     Blood culture [[id number]] Collected: 07/09/22 0832    Order Status: Completed Specimen: Blood Updated: 07/09/22 1715     Blood Culture, Routine No Growth to date    Blood culture [[id number]] Collected: 07/09/22 0832    Order Status: Completed Specimen: Blood Updated: 07/09/22 1715     Blood Culture, Routine No Growth to date    Culture, Respiratory with Gram Stain [[id number]] Collected: 07/09/22 1500    Order Status: Sent Specimen: Respiratory from Sputum Updated: 07/09/22 1515    Culture, Respiratory with Gram Stain [[id number]] Collected: 07/09/22 1500    Order Status: Sent Specimen: Respiratory from Sputum Updated: 07/09/22 1515        Summary of Chest Imaging Personally Reviewed:     X-Ray Chest AP Portable  Narrative: EXAMINATION:  XR CHEST AP PORTABLE     CLINICAL HISTORY:  Cough, unspecified     TECHNIQUE:  Single frontal view of the chest was performed.     COMPARISON:  06/29/2022     FINDINGS:  Left chest wall pacer noted.  The cardiomediastinal silhouette is prominent, similar to the previous examination noting calcification of the aorta.  Patient is rotated..  There is no pleural effusion.  The trachea is midline.  The lungs are symmetrically expanded bilaterally with coarse interstitial attenuation, similar to the previous exam.  There is patchy consolidation projected over the left mid to lower lung zone, new since the previous exam..  There is no pneumothorax.  The osseous structures are remarkable for degenerative changes and osteopenia..  Impression: 1. Interval development of patchy consolidation within the left mid and lower lung zones concerning for infection, correlation and follow-up is advised.  Findings are superimposed upon chronic interstitial attenuation, possibly reflecting underlying edema.     Electronically signed by:         Nathanael Moralez  MD  Date:                                        07/08/2022  Time:                                       17:51  Cardiac device check - Remote  Additional Comments  Remote interrogation report on CRT-D (implanted 12/17/2018)   Presenting egram demonstrates AS/BiVP   Device fxn WNL. DDD  50/130   Autocapture algorithms are on   RA pacing 1.9%, Bi-V pacing 99.8%   Atrial arrhythmias: none detected   Anticoagulation status: not on OAC   Ventricular arrhythmias: none detected   Battery status/longevity (estimated): 35 months   F/U via remote transmissions every 3 months   Appts 5/2023 w/MD and in clinic device check   Report prepared by petar Jones       CT thorax 2/26/2022:  Obliteration of the right bronchus intermedius and bilateral lower lobes basal segments bronchi possibly by mucous, aspiration or pneumonia.  There is complete volume loss of the middle lobe and patchy subsegmental atelectasis or airspace disease of the basal segments of the bilateral lower lobes right more than left.  Consider aspiration or pneumonia.  Enlarged precarinal and sub carinal nodes could be reactive to the underlying inflammatory process, less likely a neoplastic process not excluded.  Right upper lobe pleural base nodule, For a solid nodule >8 mm, Fleischner Society 2017 guidelines recommend considering follow-up CT at 3 months.  Significant coronary artery calcifications.  Bilateral small pleural effusion left more than right.  Bilateral benign adrenal adenoma.  Right thyroid lobe nodule slightly larger compared to the prior chest CT.  Ultrasound could be done if clinically warranted.     Per Dr. Maki's note 6/13/22  PFTs 10/2020:  FEV1/FVC - 50%  FEV1 - 0.85L (50%)  FVC - 1.71L (76%)  TLC - 2.95L (66%)  DLCO - 56%  Bronchodilator - no significant response     6MWT 10/2020:  No significant desaturation.  Walked 200feet with initial sat 97% and drop to 93% with exertion.  Recovered to 97% on room air.      Impression &  "Recommendations    83YO lady with CKD4, HFrEF (45%), DMII, prior breast ca (dx 1998, finished tx 2008, no radiation), severe persistent asthma, COPD (last PFTs 10/2020, pt of Dr. Maki's), known MAC (4/11/22) not under tx and known ABPA for which she did not tolerate tx (vori for sputum cx 3/9/22) here for new respiratory failure iso having been discharged 1 week ago after admission for covid-19 PNA.    New chest xray findings suspicious for infection, tristan given rapid improvement in O2 rec and sx with antibiotics. She had been seeing ID and was started on voriconazole for MAC but not able to tolerate it (it "made [her] sick"). Re aspergillosis: she has not been a candidate for therapy per Dr. Maki as standard of care for ABPA is steroid therapy which is complicated iso her MAC infection, diabetes and heart failure; she has been limited to PRN use.    - CT chest non-contrast ASAP  - will evaluate with U/S at bedside for effusion that can be sampled  - agree with vanc/cefipime/doxy pending cultures  - would resend AFB sputum cx and have ordered this, note that it is for NTM dx and that we do not have suspicion for TB, she does not need TB isolation  - agree with urine legionella, would add urine histo and strep antigens  - serum galactomannan and fungitell  - can continue home meds or formulary equivalents as tolerated (on Spiriva, Wixela (250-50), Albuterol, Duonebs, Singulair, Flonase)  - continue to wean O2 as tolerated      Thank you for involving us in the care of this charming lady. I have taken the liberty of ordering the above recommendations given the late consult (delayed due to ICU patient care). We will continue to follow along. Please call with any questions.    Corin Villavicencio MD  [employer] P[facility] Fellow, PGY4  [facility]  "

## 2022-07-10 NOTE — PROGRESS NOTES
[facility] Medicine  Progress Note    Patient Name: Myesha Quinones  MRN: [id number]  Patient Class: IP- Inpatient   Admission Date: 7/8/2022  Length of Stay: 2 days  Attending Physician: Caroline Anderson MD  Primary Care Provider: Dayton Michael MD        Subjective:     Principal Problem:Acute hypoxemic respiratory failure        HPI:  82F recently DC after being hospitalized for acute hypoxic respiratory failure due to COVID 19 PNA. Was brought to the ER with c/o several days of increasing weakness and lethargy. In the ER she was hypoxic on RA in the 80s which improved to 92-93 on NC. She was in no respiratory distress. Her CXR showed B/L infiltrates. She was given BSA Abx in the ER and  consulted for admission. DW ER MD.       Overview/Hospital Course:  No notes on file    Interval History: Reports SOB better today, but complains of nausea. Still with cough. On 3L NC. Cultures pending. CT chest pending. Pulmonology on board    Review of Systems   Constitutional:  Positive for appetite change.   HENT:  Positive for congestion. Negative for sore throat.    Respiratory:  Positive for cough and shortness of breath. Negative for wheezing.    Cardiovascular:  Negative for chest pain, palpitations and leg swelling.   Gastrointestinal:  Positive for nausea. Negative for abdominal pain and vomiting.   Genitourinary:  Negative for flank pain.   Musculoskeletal:  Negative for back pain.   Neurological:  Negative for dizziness and headaches.   Hematological:  Bruises/bleeds easily.   Objective:     Vital Signs (Most Recent):  Temp: 96.9 °F (36.1 °C) (07/10/22 0910)  Pulse: 75 (07/10/22 0910)  Resp: 18 (07/10/22 0910)  BP: (!) 163/68 (07/10/22 0910)  SpO2: 100 % (07/10/22 0910)   Vital Signs (24h Range):  Temp:  [96.1 °F (35.6 °C)-97.3 °F (36.3 °C)] 96.9 °F (36.1 °C)  Pulse:  [64-92] 75  Resp:  [16-20] 18  SpO2:  [94 %-100 %] 100 %  BP: (137-166)/(66-73) 163/68     Weight: 59.5 kg (131 lb 2.8 oz)  Body mass  index is 23.24 kg/m².    Intake/Output Summary (Last 24 hours) at 7/10/2022 1019  Last data filed at 7/10/2022 0700  Gross per 24 hour   Intake 250 ml   Output 950 ml   Net -700 ml      Physical Exam  Constitutional:       General: She is not in acute distress.     Appearance: She is not diaphoretic.   HENT:      Head: Normocephalic and atraumatic.      Nose: Congestion present. No rhinorrhea.      Mouth/Throat:      Mouth: Mucous membranes are moist.   Eyes:      Conjunctiva/sclera: Conjunctivae normal.   Cardiovascular:      Rate and Rhythm: Normal rate.      Heart sounds: No murmur heard.  Pulmonary:      Effort: Pulmonary effort is normal.      Breath sounds: Decreased breath sounds (left) present. No wheezing, rhonchi or rales.   Chest:      Chest wall: No tenderness.   Abdominal:      General: Bowel sounds are normal.      Palpations: Abdomen is soft.      Tenderness: There is no abdominal tenderness.   Musculoskeletal:      Cervical back: Neck supple.      Right lower leg: No edema.      Left lower leg: No edema.   Skin:     General: Skin is warm and dry.      Findings: Bruising present.   Neurological:      Mental Status: She is alert and oriented to person, place, and time.      Motor: Weakness present.   Psychiatric:         Mood and Affect: Mood normal.         Thought Content: Thought content normal.       Significant Labs: All pertinent labs within the past 24 hours have been reviewed.    Significant Imaging: I have reviewed all pertinent imaging results/findings within the past 24 hours.      Assessment/Plan:      * Acute hypoxemic respiratory failure  See HAP  Will wean O2 as tolerated  Monitor vol status  Repeat CXR in AM - worsening CXR  Scheduled nebs q6hA  Added steroids - Steroids discontinued due to no wheezing/SOB on exam and hx uncontrolled blood glucose on steroids   ?covid hauler   - Pulmonology consulted      HAP (hospital-acquired pneumonia)  BSA (Cefepime and Vancomycin)  Added Azithro to  cover atypicals  If MRSA screen (-) can DC Vanc  RC  Trend PCT  Added steroids for Rx of inPt PNA with hypoxia. Steroids discontinued  Consulted Pulmonary due to worsening CXR   - Cultures pending  - CT chest pending    CKD (chronic kidney disease) stage 4, GFR 15-29 ml/min  Renal fxn at baseline, Scr slightly up but delta Cr unchanged  No nsaids or cox2 (-)  Repeat chem        ACP (advance care planning)  DNI per Pt and dtr  Ok for chest compressions       Type 2 diabetes mellitus, with long-term current use of insulin  Basal + ISS  She's on steroids and her dtr stated that the last time she was on steroids she glu readings > 800.   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 Amaya; was placed on Voriconazole therapy but unable to tolerate  - With recent pseudomonas in sputum. Completed treated with Cefepime for 5 days on the previous admission  - Is admitted for pneumonia with worsening CXR  - Consulted Pulmonary    Hyponatremia  Likely 2/2 lung pathology --> ?SIADH. Avoid isotonic saline  Min fluid restriction.   tmony Ashraf    Monitor vol status   Repeat BMP      Urinary retention  Patient with hx urinary retention s/p pessary. Was discharged home with De La Cruz on previous admission    - Continue De La Cruz on admit  - UA on admission no infection      Chronic combined systolic and diastolic heart failure    Patient is identified as having Combined Systolic and Diastolic heart failure that is Chronic. CHF is currently controlled. Latest ECHO performed and demonstrates- Results for orders placed during the hospital encounter of 06/20/22    Echo 6/1/22    Interpretation Summary  · The left ventricle is normal in size with concentric hypertrophy and normal systolic function.  · The estimated ejection fraction is  55%.  · Grade II left ventricular diastolic dysfunction.  · Normal right ventricular size with normal right ventricular systolic function.  · Severe left atrial enlargement.  · Mild mitral regurgitation.  · Mild to moderate tricuspid regurgitation.  · Intermediate central venous pressure (8 mmHg).  · The estimated PA systolic pressure is 56 mmHg.  · There is pulmonary hypertension.  · There is a left pleural effusion.  . Hold Furosemide and monitor clinical status closely. Monitor on telemetry. Patient is off CHF pathway.  Monitor strict Is&Os and daily weights.  Place on fluid restriction of 1.5 L. Continue to stress to patient importance of self efficacy and  on diet for CHF. Last BNP reviewed- and noted below   Recent Labs   Lab 07/08/22  1735   *         Intake/Output Summary (Last 24 hours) at 7/10/2022 1027  Last data filed at 7/10/2022 0700  Gross per 24 hour   Intake 250 ml   Output 950 ml   Net -700 ml         Hyperlipidemia associated with type 2 diabetes mellitus   Patient is chronically on statin.will continue for now. Monitor clinically. Last LDL was   Lab Results   Component Value Date    LDLCALC 29.8 (L) 04/03/2022          Weakness  PTOTSW  Fall precautions       Hypertension associated with diabetes    Chronic, controlled.  Latest blood pressure and vitals reviewed-   Temp:  [96.1 °F (35.6 °C)-97.3 °F (36.3 °C)]   Pulse:  [64-92]   Resp:  [16-20]   BP: (137-166)/(66-73)   SpO2:  [94 %-100 %] .   Home meds for hypertension were reviewed and noted below. Hospital anti-hypertensive changes were made as shown below.  Hypertension Medications             carvediloL (COREG) 25 MG tablet TAKE 2 TABLETS (50 MG TOTAL) BY MOUTH 2 (TWO) TIMES DAILY.    furosemide (LASIX) 40 MG tablet Take 1 tablet (40 mg total) by mouth every 8 (eight) hours as needed (swelling).    hydrALAZINE (APRESOLINE) 100 MG tablet Take 1 tablet (100 mg total) by mouth 2 (two) times daily.    nitroGLYCERIN (NITROSTAT) 0.4  MG SL tablet Place 0.4 mg under the tongue every 5 (five) minutes as needed for Chest pain.     valsartan (DIOVAN) 80 MG tablet Take 1 tablet (80 mg total) by mouth 2 (two) times daily.        Will utilize p.r.n. blood pressure medication only if patient's blood pressure greater than  180/110 and she develops symptoms such as worsening chest pain or shortness of breath.    -Continues to Hydralazine, Carvedilol  - Hold valsartan due to elevated Cr. Will adjust as needed    Biventricular ICD (implantable cardioverter-defibrillator) in place  Noted. Chronic. No reported issues.        VTE Risk Mitigation (From admission, onward)         Ordered     heparin (porcine) injection 5,000 Units  Every 8 hours         07/09/22 1348     IP VTE HIGH RISK PATIENT  Once         07/08/22 1949     Place sequential compression device  Until discontinued         07/08/22 1949                Discharge Planning   ALIX:      Code Status: Partial Code   Is the patient medically ready for discharge?:     Reason for patient still in hospital (select all that apply): Patient trending condition, Imaging and Consult recommendations                     Malissa Gray NP  Department of Hospital Medicine   [facility]

## 2022-07-10 NOTE — PLAN OF CARE
The proper method of use, as well as anticipated side effects, of this aerosol treatment are discussed and demonstrated to the patient.  The proper method of use, as well as anticipated side effects, of this metered-dose inhaler are discussed and demonstrated to the patient. Patient on oxygen with documented flow.  Will attempt to wean per O2 order protocol. Will continue to monitor.

## 2022-07-10 NOTE — ASSESSMENT & PLAN NOTE
Likely 2/2 lung pathology --> ?SIADH. Avoid isotonic saline  Min fluid restriction.   Ustudies  Ulegionella    Monitor vol status   Repeat BMP

## 2022-07-10 NOTE — PROGRESS NOTES
Pharmacokinetic Assessment Follow Up: IV Vancomycin    Vancomycin serum concentration assessment(s):    The random level was drawn correctly and can be used to guide therapy at this time. The measurement is within the desired definitive target range of 15 to 20 mcg/mL.    Vancomycin Regimen Plan:    Re-dose when the random level is less than 20 mcg/mL, next level to be drawn at 7/10 on 2300    Drug levels (last 3 results):  Recent Labs   Lab Result Units 07/09/22  2117   Vancomycin, Random ug/mL 15.8       Pharmacy will continue to follow and monitor vancomycin.    Please contact pharmacy at extension [phone] for questions regarding this assessment.    Thank you for the consult,   Scott Guy       Patient brief summary:  Myesha Quinones is a 82 y.o. female initiated on antimicrobial therapy with IV Vancomycin for treatment of lower respiratory infection    The patient's current regimen is pulse dosing vanco 1g    Drug Allergies:   Review of patient's allergies indicates:   Allergen Reactions    Iodine and iodide containing products Hives    Nifedipine      weakness       Actual Body Weight:   59kg    Renal Function:   Estimated Creatinine Clearance: 14.1 mL/min (A) (based on SCr of 2.5 mg/dL (H)).,     Dialysis Method (if applicable):  N/A    CBC (last 72 hours):  Recent Labs   Lab Result Units 07/08/22 1731 07/08/22 2129 07/09/22  0323   WBC K/uL 10.23  --  10.38   Hemoglobin g/dL 8.3*  --  8.6*   Hemoglobin A1C %  --  8.0*  --    Hematocrit % 25.7*  --  26.1*   Platelets K/uL 277  --  312   Gran % % 77.4*  --  90.8*   Lymph % % 14.6*  --  7.2*   Mono % % 5.6  --  0.6*   Eosinophil % % 0.9  --  0.0   Basophil % % 0.3  --  0.3   Differential Method  Automated  --  Automated       Metabolic Panel (last 72 hours):  Recent Labs   Lab Result Units 07/08/22 1731 07/08/22 1733 07/09/22  0323 07/09/22  0553 07/09/22  0554   Sodium mmol/L 129*  --  128*  --   --    Sodium, Urine mmol/L  --   --   --   --  28    Potassium mmol/L 3.9  --  4.0  --   --    Chloride mmol/L 85*  --  88*  --   --    CO2 mmol/L 27  --  25  --   --    Glucose mg/dL 240*  --  215*  --   --    Glucose, UA   --  Negative  --  Negative  --    BUN mg/dL 86*  --  78*  --   --    Creatinine mg/dL 2.7*  --  2.5*  --   --    Creatinine, Urine mg/dL  --   --   --   --  22.5   Albumin g/dL 3.1*  --   --   --   --    Total Bilirubin mg/dL 0.3  --   --   --   --    Alkaline Phosphatase U/L 111  --   --   --   --    AST U/L 20  --   --   --   --    ALT U/L 14  --   --   --   --    Magnesium mg/dL  --   --  2.5  --   --    Phosphorus mg/dL  --   --  4.5  --   --        Vancomycin Administrations:  vancomycin given in the last 96 hours                     vancomycin 1.25 g in dextrose 5% 250 mL IVPB (ready to mix) (mg) 1,250 mg New Bag 07/08/22 1990                    Microbiologic Results:  Microbiology Results (last 7 days)       Procedure Component Value Units Date/Time    AFB Culture & Smear [[id number]]     Order Status: No result Specimen: Respiratory from Sputum, Expectorated     Blood culture [[id number]] Collected: 07/09/22 0832    Order Status: Completed Specimen: Blood Updated: 07/09/22 1715     Blood Culture, Routine No Growth to date    Blood culture [[id number]] Collected: 07/09/22 0832    Order Status: Completed Specimen: Blood Updated: 07/09/22 1715     Blood Culture, Routine No Growth to date    Culture, Respiratory with Gram Stain [[id number]] Collected: 07/09/22 1500    Order Status: Sent Specimen: Respiratory from Sputum Updated: 07/09/22 1515    Culture, Respiratory with Gram Stain [[id number]] Collected: 07/09/22 1500    Order Status: Sent Specimen: Respiratory from Sputum Updated: 07/09/22 1515

## 2022-07-11 ENCOUNTER — TELEPHONE (OUTPATIENT)
Dept: DERMATOLOGY | Facility: CLINIC | Age: 82
End: 2022-07-11
Payer: MEDICARE

## 2022-07-11 ENCOUNTER — PATIENT OUTREACH (OUTPATIENT)
Dept: ADMINISTRATIVE | Facility: OTHER | Age: 82
End: 2022-07-11
Payer: MEDICARE

## 2022-07-11 ENCOUNTER — TELEPHONE (OUTPATIENT)
Dept: FAMILY MEDICINE | Facility: CLINIC | Age: 82
End: 2022-07-11
Payer: MEDICARE

## 2022-07-11 LAB
ANION GAP SERPL CALC-SCNC: 13 MMOL/L (ref 8–16)
APPEARANCE FLD: CLEAR
BASOPHILS # BLD AUTO: 0.04 K/UL (ref 0–0.2)
BASOPHILS NFR BLD: 0.3 % (ref 0–1.9)
BODY FLD TYPE: NORMAL
BUN SERPL-MCNC: 97 MG/DL (ref 8–23)
CALCIUM SERPL-MCNC: 8.4 MG/DL (ref 8.7–10.5)
CHLORIDE SERPL-SCNC: 92 MMOL/L (ref 95–110)
CO2 SERPL-SCNC: 26 MMOL/L (ref 23–29)
COLOR FLD: YELLOW
CREAT SERPL-MCNC: 2.2 MG/DL (ref 0.5–1.4)
DIFFERENTIAL METHOD: ABNORMAL
EOSINOPHIL # BLD AUTO: 0.1 K/UL (ref 0–0.5)
EOSINOPHIL NFR BLD: 0.4 % (ref 0–8)
ERYTHROCYTE [DISTWIDTH] IN BLOOD BY AUTOMATED COUNT: 15.4 % (ref 11.5–14.5)
EST. GFR  (AFRICAN AMERICAN): 23 ML/MIN/1.73 M^2
EST. GFR  (NON AFRICAN AMERICAN): 20 ML/MIN/1.73 M^2
GLUCOSE SERPL-MCNC: 39 MG/DL (ref 70–110)
HCT VFR BLD AUTO: 24.1 % (ref 37–48.5)
HGB BLD-MCNC: 8 G/DL (ref 12–16)
IMM GRANULOCYTES # BLD AUTO: 0.16 K/UL (ref 0–0.04)
IMM GRANULOCYTES NFR BLD AUTO: 1.3 % (ref 0–0.5)
LYMPHOCYTES # BLD AUTO: 1.5 K/UL (ref 1–4.8)
LYMPHOCYTES NFR BLD: 11.6 % (ref 18–48)
LYMPHOCYTES NFR FLD MANUAL: 72 %
MCH RBC QN AUTO: 28.6 PG (ref 27–31)
MCHC RBC AUTO-ENTMCNC: 33.2 G/DL (ref 32–36)
MCV RBC AUTO: 86 FL (ref 82–98)
MONOCYTES # BLD AUTO: 1 K/UL (ref 0.3–1)
MONOCYTES NFR BLD: 8.3 % (ref 4–15)
MONOS+MACROS NFR FLD MANUAL: 6 %
NEUTROPHILS # BLD AUTO: 9.8 K/UL (ref 1.8–7.7)
NEUTROPHILS NFR BLD: 78.1 % (ref 38–73)
NEUTROPHILS NFR FLD MANUAL: 22 %
NRBC BLD-RTO: 0 /100 WBC
PLATELET # BLD AUTO: 326 K/UL (ref 150–450)
PMV BLD AUTO: 10.3 FL (ref 9.2–12.9)
POCT GLUCOSE: 173 MG/DL (ref 70–110)
POCT GLUCOSE: 176 MG/DL (ref 70–110)
POCT GLUCOSE: 35 MG/DL (ref 70–110)
POCT GLUCOSE: 441 MG/DL (ref 70–110)
POCT GLUCOSE: 49 MG/DL (ref 70–110)
POCT GLUCOSE: 81 MG/DL (ref 70–110)
POTASSIUM SERPL-SCNC: 4.2 MMOL/L (ref 3.5–5.1)
RBC # BLD AUTO: 2.8 M/UL (ref 4–5.4)
SODIUM SERPL-SCNC: 131 MMOL/L (ref 136–145)
VANCOMYCIN SERPL-MCNC: 20.4 UG/ML
WBC # BLD AUTO: 12.52 K/UL (ref 3.9–12.7)
WBC # FLD: 256 /CU MM

## 2022-07-11 PROCEDURE — 87206 SMEAR FLUORESCENT/ACID STAI: CPT | Performed by: STUDENT IN AN ORGANIZED HEALTH CARE EDUCATION/TRAINING PROGRAM

## 2022-07-11 PROCEDURE — 82150 ASSAY OF AMYLASE: CPT | Performed by: STUDENT IN AN ORGANIZED HEALTH CARE EDUCATION/TRAINING PROGRAM

## 2022-07-11 PROCEDURE — 63600175 PHARM REV CODE 636 W HCPCS: Performed by: INTERNAL MEDICINE

## 2022-07-11 PROCEDURE — 85025 COMPLETE CBC W/AUTO DIFF WBC: CPT | Performed by: INTERNAL MEDICINE

## 2022-07-11 PROCEDURE — 63600175 PHARM REV CODE 636 W HCPCS: Performed by: NURSE PRACTITIONER

## 2022-07-11 PROCEDURE — 88112 PR  CYTOPATH, CELL ENHANCE TECH: ICD-10-PCS | Mod: 26,,, | Performed by: PATHOLOGY

## 2022-07-11 PROCEDURE — 88305 TISSUE EXAM BY PATHOLOGIST: CPT | Performed by: PATHOLOGY

## 2022-07-11 PROCEDURE — 88341 IMHCHEM/IMCYTCHM EA ADD ANTB: CPT | Mod: 59 | Performed by: PATHOLOGY

## 2022-07-11 PROCEDURE — 36415 COLL VENOUS BLD VENIPUNCTURE: CPT | Performed by: INTERNAL MEDICINE

## 2022-07-11 PROCEDURE — 87075 CULTR BACTERIA EXCEPT BLOOD: CPT | Performed by: STUDENT IN AN ORGANIZED HEALTH CARE EDUCATION/TRAINING PROGRAM

## 2022-07-11 PROCEDURE — C9399 UNCLASSIFIED DRUGS OR BIOLOG: HCPCS | Performed by: NURSE PRACTITIONER

## 2022-07-11 PROCEDURE — 89051 BODY FLUID CELL COUNT: CPT | Performed by: STUDENT IN AN ORGANIZED HEALTH CARE EDUCATION/TRAINING PROGRAM

## 2022-07-11 PROCEDURE — 88342 IMHCHEM/IMCYTCHM 1ST ANTB: CPT | Performed by: PATHOLOGY

## 2022-07-11 PROCEDURE — 88305 TISSUE EXAM BY PATHOLOGIST: ICD-10-PCS | Mod: 26,,, | Performed by: PATHOLOGY

## 2022-07-11 PROCEDURE — 87102 FUNGUS ISOLATION CULTURE: CPT | Performed by: STUDENT IN AN ORGANIZED HEALTH CARE EDUCATION/TRAINING PROGRAM

## 2022-07-11 PROCEDURE — 88312 PR  SPECIAL STAINS,GROUP I: ICD-10-PCS | Mod: 26,,, | Performed by: PATHOLOGY

## 2022-07-11 PROCEDURE — 82945 GLUCOSE OTHER FLUID: CPT | Performed by: STUDENT IN AN ORGANIZED HEALTH CARE EDUCATION/TRAINING PROGRAM

## 2022-07-11 PROCEDURE — 88112 CYTOPATH CELL ENHANCE TECH: CPT | Performed by: PATHOLOGY

## 2022-07-11 PROCEDURE — 88305 TISSUE EXAM BY PATHOLOGIST: CPT | Mod: 26,,, | Performed by: PATHOLOGY

## 2022-07-11 PROCEDURE — 63600175 PHARM REV CODE 636 W HCPCS: Performed by: STUDENT IN AN ORGANIZED HEALTH CARE EDUCATION/TRAINING PROGRAM

## 2022-07-11 PROCEDURE — 83615 LACTATE (LD) (LDH) ENZYME: CPT | Performed by: STUDENT IN AN ORGANIZED HEALTH CARE EDUCATION/TRAINING PROGRAM

## 2022-07-11 PROCEDURE — 94640 AIRWAY INHALATION TREATMENT: CPT

## 2022-07-11 PROCEDURE — 87116 MYCOBACTERIA CULTURE: CPT | Performed by: STUDENT IN AN ORGANIZED HEALTH CARE EDUCATION/TRAINING PROGRAM

## 2022-07-11 PROCEDURE — 36415 COLL VENOUS BLD VENIPUNCTURE: CPT | Performed by: HOSPITALIST

## 2022-07-11 PROCEDURE — 27000207 HC ISOLATION

## 2022-07-11 PROCEDURE — 25000003 PHARM REV CODE 250: Performed by: INTERNAL MEDICINE

## 2022-07-11 PROCEDURE — 88112 CYTOPATH CELL ENHANCE TECH: CPT | Mod: 26,,, | Performed by: PATHOLOGY

## 2022-07-11 PROCEDURE — 88313 SPECIAL STAINS GROUP 2: CPT | Mod: 26,,, | Performed by: PATHOLOGY

## 2022-07-11 PROCEDURE — 88342 CHG IMMUNOCYTOCHEMISTRY: ICD-10-PCS | Mod: 26,,, | Performed by: PATHOLOGY

## 2022-07-11 PROCEDURE — 88313 PR  SPECIAL STAINS,GROUP II: ICD-10-PCS | Mod: 26,,, | Performed by: PATHOLOGY

## 2022-07-11 PROCEDURE — 94761 N-INVAS EAR/PLS OXIMETRY MLT: CPT

## 2022-07-11 PROCEDURE — 88341 PR IHC OR ICC EACH ADD'L SINGLE ANTIBODY  STAINPR: ICD-10-PCS | Mod: 26,,, | Performed by: PATHOLOGY

## 2022-07-11 PROCEDURE — 88342 IMHCHEM/IMCYTCHM 1ST ANTB: CPT | Mod: 26,,, | Performed by: PATHOLOGY

## 2022-07-11 PROCEDURE — 63700000 PHARM REV CODE 250 ALT 637 W/O HCPCS: Performed by: INTERNAL MEDICINE

## 2022-07-11 PROCEDURE — 80048 BASIC METABOLIC PNL TOTAL CA: CPT | Performed by: INTERNAL MEDICINE

## 2022-07-11 PROCEDURE — 25000003 PHARM REV CODE 250: Performed by: HOSPITALIST

## 2022-07-11 PROCEDURE — 88313 SPECIAL STAINS GROUP 2: CPT | Performed by: PATHOLOGY

## 2022-07-11 PROCEDURE — 82042 OTHER SOURCE ALBUMIN QUAN EA: CPT | Performed by: STUDENT IN AN ORGANIZED HEALTH CARE EDUCATION/TRAINING PROGRAM

## 2022-07-11 PROCEDURE — 25000003 PHARM REV CODE 250: Performed by: NURSE PRACTITIONER

## 2022-07-11 PROCEDURE — 88312 SPECIAL STAINS GROUP 1: CPT | Performed by: PATHOLOGY

## 2022-07-11 PROCEDURE — 11000001 HC ACUTE MED/SURG PRIVATE ROOM

## 2022-07-11 PROCEDURE — 87070 CULTURE OTHR SPECIMN AEROBIC: CPT | Performed by: STUDENT IN AN ORGANIZED HEALTH CARE EDUCATION/TRAINING PROGRAM

## 2022-07-11 PROCEDURE — 80202 ASSAY OF VANCOMYCIN: CPT | Performed by: HOSPITALIST

## 2022-07-11 PROCEDURE — 88341 IMHCHEM/IMCYTCHM EA ADD ANTB: CPT | Mod: 26,,, | Performed by: PATHOLOGY

## 2022-07-11 PROCEDURE — 25000242 PHARM REV CODE 250 ALT 637 W/ HCPCS: Performed by: INTERNAL MEDICINE

## 2022-07-11 PROCEDURE — 99900035 HC TECH TIME PER 15 MIN (STAT)

## 2022-07-11 PROCEDURE — 84157 ASSAY OF PROTEIN OTHER: CPT | Performed by: STUDENT IN AN ORGANIZED HEALTH CARE EDUCATION/TRAINING PROGRAM

## 2022-07-11 PROCEDURE — 88312 SPECIAL STAINS GROUP 1: CPT | Mod: 26,,, | Performed by: PATHOLOGY

## 2022-07-11 PROCEDURE — 87205 SMEAR GRAM STAIN: CPT | Performed by: STUDENT IN AN ORGANIZED HEALTH CARE EDUCATION/TRAINING PROGRAM

## 2022-07-11 RX ORDER — INSULIN ASPART 100 [IU]/ML
2 INJECTION, SOLUTION INTRAVENOUS; SUBCUTANEOUS
Status: DISCONTINUED | OUTPATIENT
Start: 2022-07-11 | End: 2022-07-15 | Stop reason: HOSPADM

## 2022-07-11 RX ORDER — LIDOCAINE HYDROCHLORIDE 10 MG/ML
5 INJECTION, SOLUTION EPIDURAL; INFILTRATION; INTRACAUDAL; PERINEURAL ONCE AS NEEDED
Status: DISCONTINUED | OUTPATIENT
Start: 2022-07-11 | End: 2022-07-15 | Stop reason: HOSPADM

## 2022-07-11 RX ORDER — HEPARIN SODIUM 5000 [USP'U]/ML
5000 INJECTION, SOLUTION INTRAVENOUS; SUBCUTANEOUS EVERY 8 HOURS
Status: DISCONTINUED | OUTPATIENT
Start: 2022-07-11 | End: 2022-07-15 | Stop reason: HOSPADM

## 2022-07-11 RX ADMIN — MUPIROCIN: 20 OINTMENT TOPICAL at 09:07

## 2022-07-11 RX ADMIN — INSULIN ASPART 10 UNITS: 100 INJECTION, SOLUTION INTRAVENOUS; SUBCUTANEOUS at 04:07

## 2022-07-11 RX ADMIN — LACTOBACILLUS ACIDOPHILUS / LACTOBACILLUS BULGARICUS 1 EACH: 100 MILLION CFU STRENGTH GRANULES at 08:07

## 2022-07-11 RX ADMIN — INSULIN ASPART 2 UNITS: 100 INJECTION, SOLUTION INTRAVENOUS; SUBCUTANEOUS at 05:07

## 2022-07-11 RX ADMIN — MONTELUKAST 10 MG: 10 TABLET, FILM COATED ORAL at 08:07

## 2022-07-11 RX ADMIN — CEFEPIME 2 G: 2 INJECTION, POWDER, FOR SOLUTION INTRAVENOUS at 08:07

## 2022-07-11 RX ADMIN — ASPIRIN 81 MG: 81 TABLET, CHEWABLE ORAL at 09:07

## 2022-07-11 RX ADMIN — CARVEDILOL 6.25 MG: 6.25 TABLET, FILM COATED ORAL at 09:07

## 2022-07-11 RX ADMIN — LACTOBACILLUS ACIDOPHILUS / LACTOBACILLUS BULGARICUS 1 EACH: 100 MILLION CFU STRENGTH GRANULES at 09:07

## 2022-07-11 RX ADMIN — HYDRALAZINE HYDROCHLORIDE 100 MG: 25 TABLET, FILM COATED ORAL at 08:07

## 2022-07-11 RX ADMIN — IPRATROPIUM BROMIDE AND ALBUTEROL SULFATE 3 ML: .5; 3 SOLUTION RESPIRATORY (INHALATION) at 07:07

## 2022-07-11 RX ADMIN — MUPIROCIN: 20 OINTMENT TOPICAL at 08:07

## 2022-07-11 RX ADMIN — FLUTICASONE FUROATE AND VILANTEROL TRIFENATATE 1 PUFF: 100; 25 POWDER RESPIRATORY (INHALATION) at 07:07

## 2022-07-11 RX ADMIN — PRAVASTATIN SODIUM 40 MG: 40 TABLET ORAL at 09:07

## 2022-07-11 RX ADMIN — ACETAMINOPHEN 650 MG: 325 TABLET ORAL at 08:07

## 2022-07-11 RX ADMIN — IPRATROPIUM BROMIDE AND ALBUTEROL SULFATE 3 ML: .5; 3 SOLUTION RESPIRATORY (INHALATION) at 01:07

## 2022-07-11 RX ADMIN — CARVEDILOL 6.25 MG: 6.25 TABLET, FILM COATED ORAL at 04:07

## 2022-07-11 RX ADMIN — FLUTICASONE PROPIONATE 100 MCG: 50 SPRAY, METERED NASAL at 09:07

## 2022-07-11 RX ADMIN — GUAIFENESIN AND DEXTROMETHORPHAN HYDROBROMIDE 1 TABLET: 600; 30 TABLET, EXTENDED RELEASE ORAL at 09:07

## 2022-07-11 RX ADMIN — GUAIFENESIN AND DEXTROMETHORPHAN HYDROBROMIDE 1 TABLET: 600; 30 TABLET, EXTENDED RELEASE ORAL at 08:07

## 2022-07-11 RX ADMIN — INSULIN DETEMIR 8 UNITS: 100 INJECTION, SOLUTION SUBCUTANEOUS at 05:07

## 2022-07-11 RX ADMIN — HEPARIN SODIUM 5000 UNITS: 5000 INJECTION INTRAVENOUS; SUBCUTANEOUS at 09:07

## 2022-07-11 RX ADMIN — Medication 24 G: at 06:07

## 2022-07-11 RX ADMIN — AZITHROMYCIN MONOHYDRATE 250 MG: 250 TABLET ORAL at 09:07

## 2022-07-11 RX ADMIN — HYDRALAZINE HYDROCHLORIDE 100 MG: 25 TABLET, FILM COATED ORAL at 09:07

## 2022-07-11 NOTE — ASSESSMENT & PLAN NOTE
Renal fxn at baseline, Scr slightly up but delta Cr unchanged  No nsaids or cox2 (-)  Improving  Monitor

## 2022-07-11 NOTE — HOSPITAL COURSE
Patient presented to the hospital for increasing weakness and lethargy s/p being discharged 8 days ago for COVID-19 pneumonia. Patient was admitted to the hospital medicine with acute on chronic hypoxic respiratory failure due to HAP. She was placed on broad spectrum antibiotics with Cefepime, Vancomycin, and Azithromycin, and IV steroids. Steroids was later discontinued due to clinically improvement. Pulmonology was consulted due to recurrent pneumonia and worsening CXR.CT chest revealed large left effusion with significant left lower lobe atelectasis . Pulmonology performed US/thoracentesis at the bed side on 7/11 with 700 ml drainage and sample sent to lab for micro and cytology as well as fluid analysis.  Pleural fluid culture is saying that is still in process, however rare wbc's and no organisms seen on Gram stain.  Patient's sputum culture from admission did grow out Pseudomonas which was pansensitive.  Patient underwent 7 days of IV cefepime treatment.  Patient was weaned down to room air, she underwent home O2 qualification testing and did not require home oxygen.        SHe was discharged with home health, with no further antibiotics.  Her Lasix was restarted at daily dosing.  Patient is to follow-up with PCP for further management of diuretics, repeat blood work, and was management.   Overall it appears the patient has ongoing diffuse weakness and fatigue are likely related to recent COVID infection, concern for lung hold COVID.  She has also had prolonged to back-to-back hospitalizations which may be contributing to her overall fatigue and weakness. Patient should follow up with endocrine for difficult to control diabetes with hyper and hypo-glycemia episodes

## 2022-07-11 NOTE — TELEPHONE ENCOUNTER
Spoke to pt's son  Rescheduled procedure for July 20th  Informed pt's son to call back if pt is still in the hospital and needs to reschedule procedure  Pt's son stated he understood and thanked me for the call

## 2022-07-11 NOTE — ASSESSMENT & PLAN NOTE
CT chest 7/10 with large L-sided pleural effusion with significant left lower lobe atelectasis  Pulmonology consulted. Planning US/thoracentesis possible 7/11

## 2022-07-11 NOTE — ASSESSMENT & PLAN NOTE
Basal + ISS  She's on steroids and her dtr stated that the last time she was on steroids she glu readings > 800.   Will monitor adjust based on trends  A1C 8.0 (7/8)  - Steroids discontinued  - AG elevated  - Added prandial insulin  - Episodes of hypoglycemia over night 7/10. Insulin therapy hold for now. Will adjust as needed

## 2022-07-11 NOTE — TELEPHONE ENCOUNTER
----- Message from Randi West sent at 7/8/2022  2:02 PM CDT -----  Regarding: Bp home health  Type:  Patient Returning Call    Who Called:home health     Who Left Message for Patient:n/a    Does the patient know what this is regarding?:100/40 Bp. Patient started feeling bad after appt.    Would the patient rather a call back or a response via [facility]? Call back     Best Call Back Number:[phone] or [phone]  Additional Information: n/a

## 2022-07-11 NOTE — PROGRESS NOTES
IP Liaison - Initial Visit Note    Patient: Myesha Quinones  MRN:  [id number]  Date of Service:  7/11/2022  Completed by:  ORESTES Pederson    Reason for Visit   Patient presents with    IP Liaison Initial Visit       RSW met with patient and pt son at bedside in order to complete SDOH questionnaire and liaison assessment.  Pt son has identified no social barriers to care.  Per pt son, pt is not in need of resources at this time.    The following were addressed during this visit:  - Review SDOH Questions   - Complete patient assessment   - Complete initial visit with patient        Patient Summary     IP Liaison Patient Assessment    General  Level of Caregiver support: Member independent and does not need caregiver assistance  Have you had to make a decision between paying for any of the following in the last 2 months?: None  Transportation means: Family  Employment status: Retired and not working  Assessments  Was the PHQ Depression Screening completed this visit?: No  Was the JUSTA-7 Screening completed this visit?: No         ORESTES Pederson

## 2022-07-11 NOTE — ASSESSMENT & PLAN NOTE
Chronic, controlled.  Latest blood pressure and vitals reviewed-   Temp:  [96.3 °F (35.7 °C)-97.1 °F (36.2 °C)]   Pulse:  [69-77]   Resp:  [14-20]   BP: (129-179)/(57-88)   SpO2:  [92 %-99 %] .   Home meds for hypertension were reviewed and noted below. Hospital anti-hypertensive changes were made as shown below.  Hypertension Medications             carvediloL (COREG) 25 MG tablet TAKE 2 TABLETS (50 MG TOTAL) BY MOUTH 2 (TWO) TIMES DAILY.    furosemide (LASIX) 40 MG tablet Take 1 tablet (40 mg total) by mouth every 8 (eight) hours as needed (swelling).    hydrALAZINE (APRESOLINE) 100 MG tablet Take 1 tablet (100 mg total) by mouth 2 (two) times daily.    nitroGLYCERIN (NITROSTAT) 0.4 MG SL tablet Place 0.4 mg under the tongue every 5 (five) minutes as needed for Chest pain.     valsartan (DIOVAN) 80 MG tablet Take 1 tablet (80 mg total) by mouth 2 (two) times daily.        Will utilize p.r.n. blood pressure medication only if patient's blood pressure greater than  180/110 and she develops symptoms such as worsening chest pain or shortness of breath.    -Continues to Hydralazine, Carvedilol  - Hold valsartan due to elevated Cr. Will adjust as needed

## 2022-07-11 NOTE — PLAN OF CARE
Problem: Adult Inpatient Plan of Care  Goal: Plan of Care Review  Outcome: Ongoing, Progressing   Patient is alert, oriented X4. Care plan explained to patient, she verbalized understanding.     On room air, O2 sat maintain 92%, no  respiratory distress noted. Intermittent cough noted. Scheduled Mucinex given. On cardiac monitor, running paced sinus rhythm.     Deny nausea/vomiting/diarrhea. Pt said just a little bit bowel movement today. Scheduled Miralax given. Nose bleeding noted. Saline spray given. Patient well-tolerated. No pain complaint. Due medications given.     Maintain fall risk precaution, bed in lowest position, bed alarm on. Call light/personal items in reach. De La Cruz in place. Instructed patient call for help as needed. Will continue to monitor.

## 2022-07-11 NOTE — PROGRESS NOTES
[facility] Pulmonary/Critical Care Fellow Progress Note:    Subjective:  Patient is up and sitting at the bedside, some cough    Objective:  Last 24 Hour Vital Signs:  BP  Min: 129/57  Max: 179/72  Temp  Av.6 °F (35.9 °C)  Min: 96.3 °F (35.7 °C)  Max: 97.1 °F (36.2 °C)  Pulse  Av.6  Min: 69  Max: 80  Resp  Av.6  Min: 14  Max: 20  SpO2  Av.7 %  Min: 91 %  Max: 99 %  Body mass index is 23.24 kg/m².  I/O last 3 completed shifts:  In: 1390 [P.O.:1241; IV Piggyback:149]  Out: 3000 [Urine:3000]      Physical Exam:  General: Alert and awake in NAD  HENT:  NCAT; anicteric sclera; OP clear with MMM  Cardio:  Regular rate and rhythm with normal S1 and S2; no murmurs or rubs  Resp:  CTAB; respirations unlabored; no wheezes, crackles or rhonchi  Abdom: Soft, non-distended  Extrem: WWP with no clubbing, cyanosis or edema  Pulses: 2+ and symmetric distally  Neuro:  AAOx3; cooperative and pleasant with no focal deficits      Assessment & Plan:     83 yo F w/ PMHx of CKD4, HFrEF (45%), T2DM, prior breast ca (dx , finished tx , no radiation), severe persistent asthma, COPD (last PFTs 10/2020, pt of Dr. Keen's), prior positive MAC (22) not under tx and prior (+)ABPA for which she did not tolerate tx (vori for sputum cx 3/9/22), COVID infection  admitted for hypoxic respiratory here for new respiratory failure with findings suggestive of infection as she has improved significantly with     1. Acute respiratory failure, now resolved  - s/p 700cc thoracentesis with fluid studies, micro, cell count and cytology pending   - respiratory culture: normal irina  - agree with urine legionella, would add urine histo and strep antigens  - serum galactomannan and fungitell  - can continue home meds or formulary equivalents as tolerated (on Spiriva, Wixela (250-50), Albuterol, Duonebs, Singulair, Flonase)    2. Aspergillus positive respiratory sample in the past  - has not tolerated voriconazole  - would  repeat sputum sample to see if this is still present and not offer steroids or treatment at this time  - pending aspergillus ag and fungitell    3. MAC(+) culture 3/2022, negative 4/2022, negative 6/2022  - repeat sputum pending, would withold any treatment at this time    We will respectfully sign off, will review studies tomorrow    Shilpa Armenta MD  Pulm/CC Fellow

## 2022-07-11 NOTE — ASSESSMENT & PLAN NOTE
BSA (Cefepime and Vancomycin)  Added Azithro to cover atypicals  If MRSA screen (-) can DC Vanc  RC  Trend PCT  Added steroids for Rx of inPt PNA with hypoxia. Steroids discontinued  Consulted Pulmonary due to worsening CXR   - Cultures pending. Pulmonology planning US/thoracentesis with sending cultures  - CT chest 7/10 with large L-sided pleural effusion with significant left lower lobe atelectasis

## 2022-07-11 NOTE — PROGRESS NOTES
Pharmacokinetic Assessment Follow Up: IV Vancomycin    Vancomycin serum concentration assessment(s):    The random level was drawn correctly and can be used to guide therapy at this time. The measurement is above the desired definitive target range of 15 to 20 mcg/mL.    Vancomycin Regimen Plan:    Re-dose when the random level is less than 20 mcg/mL, next level to be drawn at 1100 on 7/11    Drug levels (last 3 results):  Recent Labs   Lab Result Units 07/09/22  2117 07/10/22  2303   Vancomycin, Random ug/mL 15.8 22.2       Pharmacy will continue to follow and monitor vancomycin.    Please contact pharmacy at extension [phone] for questions regarding this assessment.    Thank you for the consult,   Gabrielle Murphy       Patient brief summary:  Myesha Quinones is a 82 y.o. female initiated on antimicrobial therapy with IV Vancomycin for treatment of lower respiratory infection    The patient's current regimen is pulse dosing    Drug Allergies:   Review of patient's allergies indicates:   Allergen Reactions    Iodine and iodide containing products Hives    Nifedipine      weakness       Actual Body Weight:   59.5 kg    Renal Function:   Estimated Creatinine Clearance: 13.6 mL/min (A) (based on SCr of 2.6 mg/dL (H)).,     Dialysis Method (if applicable):  N/A    CBC (last 72 hours):  Recent Labs   Lab Result Units 07/08/22  1731 07/08/22  2129 07/09/22  0323 07/10/22  0237   WBC K/uL 10.23  --  10.38 12.28   Hemoglobin g/dL 8.3*  --  8.6* 8.0*   Hemoglobin A1C %  --  8.0*  --   --    Hematocrit % 25.7*  --  26.1* 24.3*   Platelets K/uL 277  --  312 297   Gran % % 77.4*  --  90.8* 86.1*   Lymph % % 14.6*  --  7.2* 7.7*   Mono % % 5.6  --  0.6* 4.6   Eosinophil % % 0.9  --  0.0 0.0   Basophil % % 0.3  --  0.3 0.2   Differential Method  Automated  --  Automated Automated       Metabolic Panel (last 72 hours):  Recent Labs   Lab Result Units 07/08/22 1731 07/08/22 1733 07/09/22 0323 07/09/22  0553 07/09/22  0554  07/10/22  0237 07/10/22  1321   Sodium mmol/L 129*  --  128*  --   --  128* 129*   Sodium, Urine mmol/L  --   --   --   --  28  --   --    Potassium mmol/L 3.9  --  4.0  --   --  4.0 4.0   Chloride mmol/L 85*  --  88*  --   --  86* 87*   CO2 mmol/L 27  --  25  --   --  24 26   Glucose mg/dL 240*  --  215*  --   --  345* 154*   Glucose, UA   --  Negative  --  Negative  --   --   --    BUN mg/dL 86*  --  78*  --   --  91* 93*   Creatinine mg/dL 2.7*  --  2.5*  --   --  2.7* 2.6*   Creatinine, Urine mg/dL  --   --   --   --  22.5  --   --    Albumin g/dL 3.1*  --   --   --   --   --   --    Total Bilirubin mg/dL 0.3  --   --   --   --   --   --    Alkaline Phosphatase U/L 111  --   --   --   --   --   --    AST U/L 20  --   --   --   --   --   --    ALT U/L 14  --   --   --   --   --   --    Magnesium mg/dL  --   --  2.5  --   --   --   --    Phosphorus mg/dL  --   --  4.5  --   --   --   --        Vancomycin Administrations:  vancomycin given in the last 96 hours                     vancomycin in dextrose 5 % 1 gram/250 mL IVPB 1,000 mg (mg) 1,000 mg New Bag 07/09/22 2317    vancomycin 1.25 g in dextrose 5% 250 mL IVPB (ready to mix) (mg) 1,250 mg New Bag 07/08/22 2340                    Microbiologic Results:  Microbiology Results (last 7 days)       Procedure Component Value Units Date/Time    AFB Culture & Smear [[id number]] Collected: 07/10/22 1859    Order Status: Sent Specimen: Respiratory from Sputum, Expectorated Updated: 07/10/22 2209    Fungus culture [[id number]] Collected: 07/10/22 1859    Order Status: Sent Specimen: Respiratory from Sputum Updated: 07/10/22 2209    Blood culture [[id number]] Collected: 07/09/22 0832    Order Status: Completed Specimen: Blood Updated: 07/10/22 1022     Blood Culture, Routine No Growth to date      No Growth to date    Blood culture [[id number]] Collected: 07/09/22 0832    Order Status: Completed Specimen: Blood Updated: 07/10/22 1022     Blood Culture, Routine No Growth to  date      No Growth to date    Culture, Respiratory with Gram Stain [[id number]] Collected: 07/09/22 1500    Order Status: Completed Specimen: Respiratory from Sputum Updated: 07/10/22 0119     Gram Stain (Respiratory) <10 epithelial cells per low power field.      Gram Stain (Respiratory) Few WBC's     Gram Stain (Respiratory) Moderate Gram negative rods     Gram Stain (Respiratory) Rare Gram positive cocci    Culture, Respiratory with Gram Stain [[id number]] Collected: 07/09/22 1500    Order Status: Canceled Specimen: Respiratory from Sputum

## 2022-07-11 NOTE — ASSESSMENT & PLAN NOTE
See HAP  Will wean O2 as tolerated  Monitor vol status  Repeat CXR in AM - worsening CXR  Scheduled nebs q6hA  Added steroids - Steroids discontinued due to no wheezing/SOB on exam and hx uncontrolled blood glucose on steroids   ?covid hauler   - Pulmonology consulted - planning US/thoracentesis and send cultures 7/11  - Improving - comfortable on RA with resting

## 2022-07-11 NOTE — TELEPHONE ENCOUNTER
----- Message from Lindsey Carrillo MA sent at 7/11/2022 10:58 AM CDT -----  Contact: Macho Quinones [phone]  Patient was admitted to the hospital and son is requesting a call back to reschedule. Please call and advise the son.      Macho Quinones [phone]

## 2022-07-11 NOTE — PT/OT/SLP PROGRESS
Occupational Therapy  Visit Attempt     Patient Name:  Myesha Quinones   MRN:  [id number]    Patient not seen today secondary to Other (Comment) (nsg hold this AM- reports pt wtih increased SOB and labored breathing- plan to undergo thoracentensis today for hopefully improvement). Will follow-up as available.    7/11/2022

## 2022-07-11 NOTE — PROGRESS NOTES
[facility] Medicine  Progress Note    Patient Name: Myesha Quinones  MRN: [id number]  Patient Class: IP- Inpatient   Admission Date: 7/8/2022  Length of Stay: 3 days  Attending Physician: Caroline Anderson MD  Primary Care Provider: Dayton Michael MD        Subjective:     Principal Problem:Acute hypoxemic respiratory failure        HPI:  82F recently DC after being hospitalized for acute hypoxic respiratory failure due to COVID 19 PNA. Was brought to the ER with c/o several days of increasing weakness and lethargy. In the ER she was hypoxic on RA in the 80s which improved to 92-93 on NC. She was in no respiratory distress. Her CXR showed B/L infiltrates. She was given BSA Abx in the ER and  consulted for admission. DW ER MD.       Overview/Hospital Course:  No notes on file    Interval History: SOB much better today, on room air now. But reports fatigue due to low BG. Insulin discontinued. Pulmonology planning to do bedside US/thoracentesis today    Review of Systems   Constitutional:  Positive for fatigue.   Respiratory:  Positive for cough. Negative for shortness of breath.    Cardiovascular:  Negative for chest pain and leg swelling.   Gastrointestinal:  Negative for abdominal pain, nausea and vomiting.   Genitourinary:  Negative for flank pain.   Neurological:  Positive for weakness. Negative for dizziness, light-headedness and headaches.   Objective:     Vital Signs (Most Recent):  Temp: 96.7 °F (35.9 °C) (07/11/22 0746)  Pulse: 72 (07/11/22 0746)  Resp: 14 (07/11/22 0746)  BP: (!) 149/88 (07/11/22 0746)  SpO2: (!) 93 % (07/11/22 0746)   Vital Signs (24h Range):  Temp:  [96.3 °F (35.7 °C)-97.1 °F (36.2 °C)] 96.7 °F (35.9 °C)  Pulse:  [69-77] 72  Resp:  [14-20] 14  SpO2:  [92 %-99 %] 93 %  BP: (129-179)/(57-88) 149/88     Weight: 59.5 kg (131 lb 2.8 oz)  Body mass index is 23.24 kg/m².    Intake/Output Summary (Last 24 hours) at 7/11/2022 1118  Last data filed at 7/11/2022 0700  Gross per 24  hour   Intake 892.99 ml   Output 2500 ml   Net -1607.01 ml      Physical Exam  Constitutional:       General: She is not in acute distress.     Appearance: She is not diaphoretic.   HENT:      Head: Normocephalic and atraumatic.      Nose: No congestion or rhinorrhea.      Mouth/Throat:      Mouth: Mucous membranes are moist.   Eyes:      Conjunctiva/sclera: Conjunctivae normal.   Cardiovascular:      Rate and Rhythm: Normal rate.      Heart sounds: No murmur heard.  Pulmonary:      Effort: No respiratory distress.      Breath sounds: Decreased breath sounds (left side) present. No wheezing, rhonchi or rales.   Chest:      Chest wall: No tenderness.   Abdominal:      General: Bowel sounds are normal.      Palpations: Abdomen is soft.      Tenderness: There is no abdominal tenderness.   Genitourinary:     Comments: +De La Cruz  Musculoskeletal:      Cervical back: Neck supple.      Right lower leg: No edema.      Left lower leg: No edema.   Skin:     General: Skin is warm and dry.      Findings: Bruising present.   Neurological:      Mental Status: She is alert and oriented to person, place, and time.      Motor: Weakness present.   Psychiatric:         Mood and Affect: Mood normal.         Thought Content: Thought content normal.       Significant Labs: All pertinent labs within the past 24 hours have been reviewed.    Significant Imaging: I have reviewed all pertinent imaging results/findings within the past 24 hours.      Assessment/Plan:      * Acute hypoxemic respiratory failure  See HAP  Will wean O2 as tolerated  Monitor vol status  Repeat CXR in AM - worsening CXR  Scheduled nebs q6hA  Added steroids - Steroids discontinued due to no wheezing/SOB on exam and hx uncontrolled blood glucose on steroids   ?covid hauler   - Pulmonology consulted - planning US/thoracentesis and send cultures 7/11  - Improving - comfortable on RA with resting      HAP (hospital-acquired pneumonia)  BSA (Cefepime and Vancomycin)  Added  Azithro to cover atypicals  If MRSA screen (-) can DC Vanc  RC  Trend PCT  Added steroids for Rx of inPt PNA with hypoxia. Steroids discontinued  Consulted Pulmonary due to worsening CXR   - Cultures pending. Pulmonology planning US/thoracentesis with sending cultures  - CT chest 7/10 with large L-sided pleural effusion with significant left lower lobe atelectasis     CKD (chronic kidney disease) stage 4, GFR 15-29 ml/min  Renal fxn at baseline, Scr slightly up but delta Cr unchanged  No nsaids or cox2 (-)  Improving  Monitor        Pleural effusion   CT chest 7/10 with large L-sided pleural effusion with significant left lower lobe atelectasis  Pulmonology consulted. Planning US/thoracentesis possible 7/11      ACP (advance care planning)  DNI per Pt and dtr  Ok for chest compressions       Type 2 diabetes mellitus, with long-term current use of insulin  Basal + ISS  She's on steroids and her dtr stated that the last time she was on steroids she glu readings > 800.   Will monitor adjust based on trends  A1C 8.0 (7/8)  - Steroids discontinued  - AG elevated  - Added prandial insulin  - Episodes of hypoglycemia over night 7/10. Insulin therapy hold for now. 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 Amaya; was placed on Voriconazole therapy but unable to tolerate  - With recent pseudomonas in sputum. Completed treated with Cefepime for 5 days on the previous admission  - Is admitted for pneumonia with worsening CXR  - Consulted Pulmonary    Hyponatremia  Likely 2/2 lung pathology --> ?SIADH. Avoid isotonic saline  Min fluid restriction.   Jose Ashraf    Monitor vol status   - Improving      Urinary retention  Patient with hx urinary retention s/p pessary. Was discharged home with De La Cruz on previous admission    - Continue De La Cruz on admit  - UA on admission no infection      Chronic combined systolic and diastolic heart  failure    Patient is identified as having Combined Systolic and Diastolic heart failure that is Chronic. CHF is currently controlled. Latest ECHO performed and demonstrates- Results for orders placed during the hospital encounter of 06/20/22    Echo 6/1/22    Interpretation Summary  · The left ventricle is normal in size with concentric hypertrophy and normal systolic function.  · The estimated ejection fraction is 55%.  · Grade II left ventricular diastolic dysfunction.  · Normal right ventricular size with normal right ventricular systolic function.  · Severe left atrial enlargement.  · Mild mitral regurgitation.  · Mild to moderate tricuspid regurgitation.  · Intermediate central venous pressure (8 mmHg).  · The estimated PA systolic pressure is 56 mmHg.  · There is pulmonary hypertension.  · There is a left pleural effusion.  . Hold Furosemide and monitor clinical status closely. Monitor on telemetry. Patient is off CHF pathway.  Monitor strict Is&Os and daily weights.  Place on fluid restriction of 1.5 L. Continue to stress to patient importance of self efficacy and  on diet for CHF. Last BNP reviewed- and noted below   Recent Labs   Lab 07/10/22  1321   *         Intake/Output Summary (Last 24 hours) at 7/11/2022 1151  Last data filed at 7/11/2022 0700  Gross per 24 hour   Intake 892.99 ml   Output 2500 ml   Net -1607.01 ml     - Monitor    Hyperlipidemia associated with type 2 diabetes mellitus   Patient is chronically on statin.will continue for now. Monitor clinically. Last LDL was   Lab Results   Component Value Date    LDLCALC 29.8 (L) 04/03/2022          Weakness  PTOTSW  Fall precautions       Hypertension associated with diabetes    Chronic, controlled.  Latest blood pressure and vitals reviewed-   Temp:  [96.3 °F (35.7 °C)-97.1 °F (36.2 °C)]   Pulse:  [69-77]   Resp:  [14-20]   BP: (129-179)/(57-88)   SpO2:  [92 %-99 %] .   Home meds for hypertension were reviewed and noted below.  Hospital anti-hypertensive changes were made as shown below.  Hypertension Medications             carvediloL (COREG) 25 MG tablet TAKE 2 TABLETS (50 MG TOTAL) BY MOUTH 2 (TWO) TIMES DAILY.    furosemide (LASIX) 40 MG tablet Take 1 tablet (40 mg total) by mouth every 8 (eight) hours as needed (swelling).    hydrALAZINE (APRESOLINE) 100 MG tablet Take 1 tablet (100 mg total) by mouth 2 (two) times daily.    nitroGLYCERIN (NITROSTAT) 0.4 MG SL tablet Place 0.4 mg under the tongue every 5 (five) minutes as needed for Chest pain.     valsartan (DIOVAN) 80 MG tablet Take 1 tablet (80 mg total) by mouth 2 (two) times daily.        Will utilize p.r.n. blood pressure medication only if patient's blood pressure greater than  180/110 and she develops symptoms such as worsening chest pain or shortness of breath.    -Continues to Hydralazine, Carvedilol  - Hold valsartan due to elevated Cr. Will adjust as needed    Biventricular ICD (implantable cardioverter-defibrillator) in place  Noted. Chronic. No reported issues.        VTE Risk Mitigation (From admission, onward)         Ordered     IP VTE HIGH RISK PATIENT  Once         07/08/22 1949     Place sequential compression device  Until discontinued         07/08/22 1949                Discharge Planning   ALIX:      Code Status: Partial Code   Is the patient medically ready for discharge?:     Reason for patient still in hospital (select all that apply): Patient trending condition, Treatment and Consult recommendations                     Malissa Gray NP  Department of [facility] Medicine   [facility]

## 2022-07-11 NOTE — ASSESSMENT & PLAN NOTE
Likely 2/2 lung pathology --> ?SIADH. Avoid isotonic saline  Min fluid restriction.   Ustudies  Ulegionella    Monitor vol status   - Improving

## 2022-07-11 NOTE — PROCEDURES
[facility]  Thoracentesis  Procedure Note    SUMMARY     Date of Procedure:      Procedure: Thoracentesis    Indications: new L sided pleural effusion    Pre-Operative Diagnosis: Acute respiratory failure    Post-Operative Diagnosis: normal    Anesthesia: local 1% intra-dermal lidocaine    Description of the Findings of the Procedure:     Consent: Informed consent was obtained. Risks of the procedure were discussed including: infection, bleeding, pain, pneumothorax.    Under sterile conditions the patient was positioned. Chlorhexadine solution and sterile drapes were utilized.  1% plain lidocaine was used to anesthetize between the rib space after localized under ultrasound. Fluid was obtained after catheter inserted without  difficulty and suction applied with minimal blood loss.  A dressing was applied to the wound and wound care instructions were provided.     700 ml of marques pleural fluid was obtained. A sample was sent to Pathology for cytogenetics, flow, and cell counts, as well as for infection analysis.    Plan:    A follow up chest x-ray was ordered.  Bed Rest for 0 hours.  Tylenol 650 mg. for pain.    Significant Surgical Tasks Conducted by the Assistant(s), if Applicable: Supervision    Complications: None; patient tolerated the procedure well.    Total IV Fluids: 700ml    Estimated Blood Loss (EBL): 1cc    Specimens: Marques fluid sent to lab for micro and cytology as well as fluid analysis           Condition: stable    Disposition: Hospital [place], stable    Attestation: I was present and scrubbed for the entire procedure.

## 2022-07-11 NOTE — PROGRESS NOTES
[facility]                   Pharmacy  Pharmacy Vancomycin/AG Sign-off    Therapy with vancomycin completed and/or consult discontinued by provider.  Pharmacy will sign off, please re-consult as needed. Thank you for allowing us to participate in this patient's care.     Palmer Gray, PharmD  [phone]

## 2022-07-11 NOTE — PT/OT/SLP PROGRESS
Physical Therapy      Patient Name:  Myesha Quinones   MRN:  [id number]    Patient not seen today secondary to Nurse/ JOSE D hold (pt with increased SOB and labored breathing and plan to undergo thoracentensis this date). Will follow-up as able.    7/11/2022

## 2022-07-11 NOTE — ASSESSMENT & PLAN NOTE
Patient is identified as having Combined Systolic and Diastolic heart failure that is Chronic. CHF is currently controlled. Latest ECHO performed and demonstrates- Results for orders placed during the hospital encounter of 06/20/22    Echo 6/1/22    Interpretation Summary  · The left ventricle is normal in size with concentric hypertrophy and normal systolic function.  · The estimated ejection fraction is 55%.  · Grade II left ventricular diastolic dysfunction.  · Normal right ventricular size with normal right ventricular systolic function.  · Severe left atrial enlargement.  · Mild mitral regurgitation.  · Mild to moderate tricuspid regurgitation.  · Intermediate central venous pressure (8 mmHg).  · The estimated PA systolic pressure is 56 mmHg.  · There is pulmonary hypertension.  · There is a left pleural effusion.  . Hold Furosemide and monitor clinical status closely. Monitor on telemetry. Patient is off CHF pathway.  Monitor strict Is&Os and daily weights.  Place on fluid restriction of 1.5 L. Continue to stress to patient importance of self efficacy and  on diet for CHF. Last BNP reviewed- and noted below   Recent Labs   Lab 07/10/22  1321   *         Intake/Output Summary (Last 24 hours) at 7/11/2022 1151  Last data filed at 7/11/2022 0700  Gross per 24 hour   Intake 892.99 ml   Output 2500 ml   Net -1607.01 ml     - Monitor

## 2022-07-11 NOTE — SUBJECTIVE & OBJECTIVE
Interval History: SOB much better today, on room air now. But reports fatigue due to low BG. Insulin discontinued. Pulmonology planning to do bedside US/thoracentesis today    Review of Systems   Constitutional:  Positive for fatigue.   Respiratory:  Positive for cough. Negative for shortness of breath.    Cardiovascular:  Negative for chest pain and leg swelling.   Gastrointestinal:  Negative for abdominal pain, nausea and vomiting.   Genitourinary:  Negative for flank pain.   Neurological:  Positive for weakness. Negative for dizziness, light-headedness and headaches.   Objective:     Vital Signs (Most Recent):  Temp: 96.7 °F (35.9 °C) (07/11/22 0746)  Pulse: 72 (07/11/22 0746)  Resp: 14 (07/11/22 0746)  BP: (!) 149/88 (07/11/22 0746)  SpO2: (!) 93 % (07/11/22 0746)   Vital Signs (24h Range):  Temp:  [96.3 °F (35.7 °C)-97.1 °F (36.2 °C)] 96.7 °F (35.9 °C)  Pulse:  [69-77] 72  Resp:  [14-20] 14  SpO2:  [92 %-99 %] 93 %  BP: (129-179)/(57-88) 149/88     Weight: 59.5 kg (131 lb 2.8 oz)  Body mass index is 23.24 kg/m².    Intake/Output Summary (Last 24 hours) at 7/11/2022 1118  Last data filed at 7/11/2022 0700  Gross per 24 hour   Intake 892.99 ml   Output 2500 ml   Net -1607.01 ml      Physical Exam  Constitutional:       General: She is not in acute distress.     Appearance: She is not diaphoretic.   HENT:      Head: Normocephalic and atraumatic.      Nose: No congestion or rhinorrhea.      Mouth/Throat:      Mouth: Mucous membranes are moist.   Eyes:      Conjunctiva/sclera: Conjunctivae normal.   Cardiovascular:      Rate and Rhythm: Normal rate.      Heart sounds: No murmur heard.  Pulmonary:      Effort: No respiratory distress.      Breath sounds: Decreased breath sounds (left side) present. No wheezing, rhonchi or rales.   Chest:      Chest wall: No tenderness.   Abdominal:      General: Bowel sounds are normal.      Palpations: Abdomen is soft.      Tenderness: There is no abdominal tenderness.    Genitourinary:     Comments: +De La Cruz  Musculoskeletal:      Cervical back: Neck supple.      Right lower leg: No edema.      Left lower leg: No edema.   Skin:     General: Skin is warm and dry.      Findings: Bruising present.   Neurological:      Mental Status: She is alert and oriented to person, place, and time.      Motor: Weakness present.   Psychiatric:         Mood and Affect: Mood normal.         Thought Content: Thought content normal.       Significant Labs: All pertinent labs within the past 24 hours have been reviewed.    Significant Imaging: I have reviewed all pertinent imaging results/findings within the past 24 hours.

## 2022-07-12 ENCOUNTER — PATIENT OUTREACH (OUTPATIENT)
Dept: ADMINISTRATIVE | Facility: OTHER | Age: 82
End: 2022-07-12
Payer: MEDICARE

## 2022-07-12 LAB
1,3 BETA GLUCAN SER-MCNC: 75 PG/ML
ALBUMIN FLD-MCNC: 1.3 G/DL
AMYLASE, BODY FLUID: 10 U/L
ANION GAP SERPL CALC-SCNC: 12 MMOL/L (ref 8–16)
BODY FLUID SOURCE AMYLASE: NORMAL
BODY FLUID SOURCE, LDH: NORMAL
BUN SERPL-MCNC: 88 MG/DL (ref 8–23)
CALCIUM SERPL-MCNC: 8.4 MG/DL (ref 8.7–10.5)
CHLORIDE SERPL-SCNC: 91 MMOL/L (ref 95–110)
CO2 SERPL-SCNC: 25 MMOL/L (ref 23–29)
CREAT SERPL-MCNC: 2 MG/DL (ref 0.5–1.4)
ERYTHROCYTE [DISTWIDTH] IN BLOOD BY AUTOMATED COUNT: 15.3 % (ref 11.5–14.5)
EST. GFR  (AFRICAN AMERICAN): 26 ML/MIN/1.73 M^2
EST. GFR  (NON AFRICAN AMERICAN): 23 ML/MIN/1.73 M^2
FUNGITELL COMMENTS: ABNORMAL
GALACTOMANNAN AG SERPL IA-ACNC: <0.5 INDEX
GLUCOSE FLD-MCNC: 177 MG/DL
GLUCOSE SERPL-MCNC: 161 MG/DL (ref 70–110)
GRAM STN SPEC: NORMAL
GRAM STN SPEC: NORMAL
HCT VFR BLD AUTO: 26.4 % (ref 37–48.5)
HGB BLD-MCNC: 8.4 G/DL (ref 12–16)
LDH FLD L TO P-CCNC: 86 U/L
LDH SERPL L TO P-CCNC: 135 U/L (ref 110–260)
MCH RBC QN AUTO: 28.1 PG (ref 27–31)
MCHC RBC AUTO-ENTMCNC: 31.8 G/DL (ref 32–36)
MCV RBC AUTO: 88 FL (ref 82–98)
PLATELET # BLD AUTO: 281 K/UL (ref 150–450)
PMV BLD AUTO: 10.2 FL (ref 9.2–12.9)
POCT GLUCOSE: 169 MG/DL (ref 70–110)
POCT GLUCOSE: 177 MG/DL (ref 70–110)
POCT GLUCOSE: 208 MG/DL (ref 70–110)
POCT GLUCOSE: 214 MG/DL (ref 70–110)
POCT GLUCOSE: 230 MG/DL (ref 70–110)
POCT GLUCOSE: 264 MG/DL (ref 70–110)
POTASSIUM SERPL-SCNC: 4.3 MMOL/L (ref 3.5–5.1)
PROT FLD-MCNC: 2.2 G/DL
RBC # BLD AUTO: 2.99 M/UL (ref 4–5.4)
SODIUM SERPL-SCNC: 128 MMOL/L (ref 136–145)
SPECIMEN SOURCE: NORMAL
WBC # BLD AUTO: 10.03 K/UL (ref 3.9–12.7)

## 2022-07-12 PROCEDURE — 25000003 PHARM REV CODE 250: Performed by: INTERNAL MEDICINE

## 2022-07-12 PROCEDURE — 25000003 PHARM REV CODE 250: Performed by: HOSPITALIST

## 2022-07-12 PROCEDURE — 63600175 PHARM REV CODE 636 W HCPCS: Performed by: INTERNAL MEDICINE

## 2022-07-12 PROCEDURE — 25000242 PHARM REV CODE 250 ALT 637 W/ HCPCS: Performed by: STUDENT IN AN ORGANIZED HEALTH CARE EDUCATION/TRAINING PROGRAM

## 2022-07-12 PROCEDURE — 97116 GAIT TRAINING THERAPY: CPT | Mod: CQ

## 2022-07-12 PROCEDURE — 27000221 HC OXYGEN, UP TO 24 HOURS

## 2022-07-12 PROCEDURE — 97530 THERAPEUTIC ACTIVITIES: CPT | Mod: CQ

## 2022-07-12 PROCEDURE — 11000001 HC ACUTE MED/SURG PRIVATE ROOM

## 2022-07-12 PROCEDURE — 25000242 PHARM REV CODE 250 ALT 637 W/ HCPCS: Performed by: INTERNAL MEDICINE

## 2022-07-12 PROCEDURE — 83615 LACTATE (LD) (LDH) ENZYME: CPT | Performed by: HOSPITALIST

## 2022-07-12 PROCEDURE — 63600175 PHARM REV CODE 636 W HCPCS: Performed by: STUDENT IN AN ORGANIZED HEALTH CARE EDUCATION/TRAINING PROGRAM

## 2022-07-12 PROCEDURE — 94640 AIRWAY INHALATION TREATMENT: CPT

## 2022-07-12 PROCEDURE — 63700000 PHARM REV CODE 250 ALT 637 W/O HCPCS: Performed by: INTERNAL MEDICINE

## 2022-07-12 PROCEDURE — 85027 COMPLETE CBC AUTOMATED: CPT | Performed by: NURSE PRACTITIONER

## 2022-07-12 PROCEDURE — 97530 THERAPEUTIC ACTIVITIES: CPT | Mod: CO

## 2022-07-12 PROCEDURE — 36415 COLL VENOUS BLD VENIPUNCTURE: CPT | Performed by: NURSE PRACTITIONER

## 2022-07-12 PROCEDURE — 99900035 HC TECH TIME PER 15 MIN (STAT)

## 2022-07-12 PROCEDURE — C9399 UNCLASSIFIED DRUGS OR BIOLOG: HCPCS | Performed by: NURSE PRACTITIONER

## 2022-07-12 PROCEDURE — 36415 COLL VENOUS BLD VENIPUNCTURE: CPT | Performed by: HOSPITALIST

## 2022-07-12 PROCEDURE — 94761 N-INVAS EAR/PLS OXIMETRY MLT: CPT

## 2022-07-12 PROCEDURE — 27000207 HC ISOLATION

## 2022-07-12 PROCEDURE — 80048 BASIC METABOLIC PNL TOTAL CA: CPT | Performed by: NURSE PRACTITIONER

## 2022-07-12 PROCEDURE — 25000003 PHARM REV CODE 250: Performed by: NURSE PRACTITIONER

## 2022-07-12 RX ADMIN — ACETAMINOPHEN 650 MG: 325 TABLET ORAL at 10:07

## 2022-07-12 RX ADMIN — AZITHROMYCIN MONOHYDRATE 250 MG: 250 TABLET ORAL at 10:07

## 2022-07-12 RX ADMIN — IPRATROPIUM BROMIDE AND ALBUTEROL SULFATE 3 ML: .5; 3 SOLUTION RESPIRATORY (INHALATION) at 07:07

## 2022-07-12 RX ADMIN — HYDRALAZINE HYDROCHLORIDE 100 MG: 25 TABLET, FILM COATED ORAL at 08:07

## 2022-07-12 RX ADMIN — MONTELUKAST 10 MG: 10 TABLET, FILM COATED ORAL at 09:07

## 2022-07-12 RX ADMIN — FLUTICASONE PROPIONATE 100 MCG: 50 SPRAY, METERED NASAL at 10:07

## 2022-07-12 RX ADMIN — INSULIN ASPART 2 UNITS: 100 INJECTION, SOLUTION INTRAVENOUS; SUBCUTANEOUS at 12:07

## 2022-07-12 RX ADMIN — PRAVASTATIN SODIUM 40 MG: 40 TABLET ORAL at 10:07

## 2022-07-12 RX ADMIN — Medication 6 MG: at 12:07

## 2022-07-12 RX ADMIN — MUPIROCIN: 20 OINTMENT TOPICAL at 10:07

## 2022-07-12 RX ADMIN — HEPARIN SODIUM 5000 UNITS: 5000 INJECTION INTRAVENOUS; SUBCUTANEOUS at 06:07

## 2022-07-12 RX ADMIN — POLYETHYLENE GLYCOL 3350 17 G: 17 POWDER, FOR SOLUTION ORAL at 08:07

## 2022-07-12 RX ADMIN — HEPARIN SODIUM 5000 UNITS: 5000 INJECTION INTRAVENOUS; SUBCUTANEOUS at 05:07

## 2022-07-12 RX ADMIN — INSULIN ASPART 4 UNITS: 100 INJECTION, SOLUTION INTRAVENOUS; SUBCUTANEOUS at 05:07

## 2022-07-12 RX ADMIN — HEPARIN SODIUM 5000 UNITS: 5000 INJECTION INTRAVENOUS; SUBCUTANEOUS at 09:07

## 2022-07-12 RX ADMIN — ALBUTEROL SULFATE 2.5 MG: 2.5 SOLUTION RESPIRATORY (INHALATION) at 07:07

## 2022-07-12 RX ADMIN — ASPIRIN 81 MG: 81 TABLET, CHEWABLE ORAL at 10:07

## 2022-07-12 RX ADMIN — INSULIN ASPART 1 UNITS: 100 INJECTION, SOLUTION INTRAVENOUS; SUBCUTANEOUS at 09:07

## 2022-07-12 RX ADMIN — INSULIN ASPART 2 UNITS: 100 INJECTION, SOLUTION INTRAVENOUS; SUBCUTANEOUS at 05:07

## 2022-07-12 RX ADMIN — ACETAMINOPHEN 650 MG: 325 TABLET ORAL at 09:07

## 2022-07-12 RX ADMIN — INSULIN DETEMIR 8 UNITS: 100 INJECTION, SOLUTION SUBCUTANEOUS at 09:07

## 2022-07-12 RX ADMIN — INSULIN DETEMIR 8 UNITS: 100 INJECTION, SOLUTION SUBCUTANEOUS at 10:07

## 2022-07-12 RX ADMIN — INSULIN ASPART 6 UNITS: 100 INJECTION, SOLUTION INTRAVENOUS; SUBCUTANEOUS at 12:07

## 2022-07-12 RX ADMIN — MUPIROCIN: 20 OINTMENT TOPICAL at 09:07

## 2022-07-12 RX ADMIN — FLUTICASONE FUROATE AND VILANTEROL TRIFENATATE 1 PUFF: 100; 25 POWDER RESPIRATORY (INHALATION) at 07:07

## 2022-07-12 RX ADMIN — CEFEPIME 2 G: 2 INJECTION, POWDER, FOR SOLUTION INTRAVENOUS at 09:07

## 2022-07-12 RX ADMIN — GUAIFENESIN AND DEXTROMETHORPHAN HYDROBROMIDE 1 TABLET: 600; 30 TABLET, EXTENDED RELEASE ORAL at 10:07

## 2022-07-12 RX ADMIN — IPRATROPIUM BROMIDE AND ALBUTEROL SULFATE 3 ML: .5; 3 SOLUTION RESPIRATORY (INHALATION) at 01:07

## 2022-07-12 RX ADMIN — CARVEDILOL 6.25 MG: 6.25 TABLET, FILM COATED ORAL at 10:07

## 2022-07-12 RX ADMIN — INSULIN ASPART 2 UNITS: 100 INJECTION, SOLUTION INTRAVENOUS; SUBCUTANEOUS at 10:07

## 2022-07-12 RX ADMIN — LACTOBACILLUS ACIDOPHILUS / LACTOBACILLUS BULGARICUS 1 EACH: 100 MILLION CFU STRENGTH GRANULES at 08:07

## 2022-07-12 RX ADMIN — LACTOBACILLUS ACIDOPHILUS / LACTOBACILLUS BULGARICUS 1 EACH: 100 MILLION CFU STRENGTH GRANULES at 10:07

## 2022-07-12 RX ADMIN — GUAIFENESIN AND DEXTROMETHORPHAN HYDROBROMIDE 1 TABLET: 600; 30 TABLET, EXTENDED RELEASE ORAL at 09:07

## 2022-07-12 RX ADMIN — HYDRALAZINE HYDROCHLORIDE 100 MG: 25 TABLET, FILM COATED ORAL at 10:07

## 2022-07-12 RX ADMIN — DIPHENHYDRAMINE HYDROCHLORIDE 25 MG: 25 CAPSULE ORAL at 09:07

## 2022-07-12 RX ADMIN — CARVEDILOL 6.25 MG: 6.25 TABLET, FILM COATED ORAL at 05:07

## 2022-07-12 RX ADMIN — BENZONATATE 100 MG: 100 CAPSULE ORAL at 09:07

## 2022-07-12 RX ADMIN — HYDROCODONE BITARTRATE AND ACETAMINOPHEN 1 TABLET: 5; 325 TABLET ORAL at 06:07

## 2022-07-12 NOTE — PROGRESS NOTES
[facility] Medicine  Progress Note    Patient Name: Myesha Quinones  MRN: [id number]  Patient Class: IP- Inpatient   Admission Date: 7/8/2022  Length of Stay: 4 days  Attending Physician: Caroline Anderson MD  Primary Care Provider: Dayton Michael MD        Subjective:     Principal Problem:Acute hypoxemic respiratory failure        HPI:  82F recently DC after being hospitalized for acute hypoxic respiratory failure due to COVID 19 PNA. Was brought to the ER with c/o several days of increasing weakness and lethargy. In the ER she was hypoxic on RA in the 80s which improved to 92-93 on NC. She was in no respiratory distress. Her CXR showed B/L infiltrates. She was given BSA Abx in the ER and  consulted for admission. SUNG ER MD.       Overview/Hospital Course:  Patient presented to the hospital for increasing weakness and lethargy s/p being discharged 8 days ago for COVID-19 pneumonia. Patient was admitted to the hospital medicine with acute on chronic hypoxic respiratory failure due to HAP. She was placed on broad spectrum antibiotics with Cefepime, Vancomycin, and Azithromycin, and IV steroids. Steroids was later discontinued due to clinically improvement. Pulmonology was consulted due to recurrent pneumonia and worsening CXR. Repeat AFB smear/culture and fungal culture pending. CT chest revealed large left effusion with significant left lower lobe atelectasis . Pulmonology performed US/thoracentesis at the bed side on 7/11 with 700 ml drainage and sample sent to lab for micro and cytology as well as fluid analysis. Still requiring O2 at 1-2 L NC.      Interval History: Seen at the bedside, no distress noted. She does complain of weakness. Will cont PT/OT efforts    Review of Systems   Constitutional:  Negative for fatigue and fever.   HENT:  Negative for trouble swallowing.    Respiratory:  Negative for cough and shortness of breath.    Cardiovascular:  Negative for chest pain.   Gastrointestinal:   Negative for diarrhea, nausea and vomiting.   Genitourinary:  Negative for hematuria.   Musculoskeletal:  Positive for gait problem and myalgias.   Skin:  Negative for wound.   Neurological:  Positive for weakness.   Hematological:  Does not bruise/bleed easily.   Psychiatric/Behavioral:  Negative for confusion.    Objective:     Vital Signs (Most Recent):  Temp: 96.9 °F (36.1 °C) (07/12/22 1644)  Pulse: 76 (07/12/22 1644)  Resp: 14 (07/12/22 1644)  BP: (!) 168/70 (07/12/22 1644)  SpO2: 96 % (07/12/22 1644)   Vital Signs (24h Range):  Temp:  [96 °F (35.6 °C)-97.5 °F (36.4 °C)] 96.9 °F (36.1 °C)  Pulse:  [67-86] 76  Resp:  [14-22] 14  SpO2:  [96 %-99 %] 96 %  BP: (128-168)/(57-70) 168/70     Weight: 59.5 kg (131 lb 2.8 oz)  Body mass index is 23.24 kg/m².  No intake or output data in the 24 hours ending 07/12/22 1733   Physical Exam  Vitals and nursing note reviewed.   Constitutional:       Appearance: She is ill-appearing.   HENT:      Head: Normocephalic and atraumatic.      Mouth/Throat:      Mouth: Mucous membranes are moist.   Eyes:      Extraocular Movements: Extraocular movements intact.   Cardiovascular:      Rate and Rhythm: Normal rate.   Pulmonary:      Effort: Pulmonary effort is normal. No respiratory distress.   Abdominal:      Tenderness: There is no abdominal tenderness. There is no guarding.   Genitourinary:     Comments: De La Cruz cath in place   Musculoskeletal:         General: Normal range of motion.      Cervical back: Normal range of motion.   Skin:     General: Skin is warm and dry.   Neurological:      Mental Status: She is alert and oriented to person, place, and time.   Psychiatric:         Mood and Affect: Mood normal.       Significant Labs: All pertinent labs within the past 24 hours have been reviewed.    Significant Imaging: I have reviewed all pertinent imaging results/findings within the past 24 hours.      Assessment/Plan:      * Acute hypoxemic respiratory failure  See HAP  Will wean  O2 as tolerated  Monitor vol status  Repeat CXR in AM - worsening CXR  Scheduled nebs q6hA  Added steroids - Steroids discontinued due to no wheezing/SOB on exam and hx uncontrolled blood glucose on steroids   ?covid hauler   - Pulmonology consulted - planned US/thoracentesis and sent cultures 7/11  - Improving - comfortable on RA with resting      Pleural effusion   CT chest 7/10 with large L-sided pleural effusion with significant left lower lobe atelectasis  Pulmonology consulted. Pulm planned US/thoracentesis 7/11      CKD (chronic kidney disease) stage 4, GFR 15-29 ml/min  Renal fxn at baseline, Scr slightly up but delta Cr unchanged  No nsaids or cox2 (-)  Improving  Monitor        HAP (hospital-acquired pneumonia)  BSA (Cefepime and Vancomycin)  Added Azithro to cover atypicals  If MRSA screen (-) can DC Vanc  RC  Trend PCT  Added steroids for Rx of inPt PNA with hypoxia. Steroids discontinued  Consulted Pulmonary due to worsening CXR   - Cultures pending. Pulmonology planning US/thoracentesis with sending cultures  - CT chest 7/10 with large L-sided pleural effusion with significant left lower lobe atelectasis     ACP (advance care planning)  DNI per Pt and dtr  Ok for chest compressions       Type 2 diabetes mellitus, with long-term current use of insulin  Basal + ISS  She's on steroids and her dtr stated that the last time she was on steroids she glu readings > 800.   Will monitor adjust based on trends  A1C 8.0 (7/8)  - Steroids discontinued  - AG elevated  - Added prandial insulin  - Episodes of hypoglycemia over night 7/10. Insulin therapy hold for now. 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 Amaya; was placed on Voriconazole therapy but unable to tolerate  - With recent pseudomonas in sputum. Completed treated with Cefepime for 5 days on the previous admission  - Is admitted for pneumonia with worsening CXR  -  Appreciate Pulmonary    Hyponatremia  Likely 2/2 lung pathology --> ?SIADH. Avoid isotonic saline  Min fluid restriction.   Jose Ashraf    Monitor vol status         Urinary retention  Patient with hx urinary retention s/p pessary. Was discharged home with De La Cruz on previous admission    - Continue De La Cruz on admit  - UA on admission no infection      Chronic combined systolic and diastolic heart failure    Patient is identified as having Combined Systolic and Diastolic heart failure that is Chronic. CHF is currently controlled. Latest ECHO performed and demonstrates- Results for orders placed during the hospital encounter of 06/20/22    Echo 6/1/22    Interpretation Summary  · The left ventricle is normal in size with concentric hypertrophy and normal systolic function.  · The estimated ejection fraction is 55%.  · Grade II left ventricular diastolic dysfunction.  · Normal right ventricular size with normal right ventricular systolic function.  · Severe left atrial enlargement.  · Mild mitral regurgitation.  · Mild to moderate tricuspid regurgitation.  · Intermediate central venous pressure (8 mmHg).  · The estimated PA systolic pressure is 56 mmHg.  · There is pulmonary hypertension.  · There is a left pleural effusion.  . Hold Furosemide and monitor clinical status closely. Monitor on telemetry. Patient is off CHF pathway.  Monitor strict Is&Os and daily weights.  Place on fluid restriction of 1.5 L. Continue to stress to patient importance of self efficacy and  on diet for CHF. Last BNP reviewed- and noted below   Recent Labs   Lab 07/10/22  1321   *       No intake or output data in the 24 hours ending 07/12/22 1739  - Monitor    Hyperlipidemia associated with type 2 diabetes mellitus   Patient is chronically on statin.will continue for now. Monitor clinically. Last LDL was   Lab Results   Component Value Date    LDLCALC 29.8 (L) 04/03/2022          Weakness  PTOTSW  Fall precautions    PT/OT recommends Home with HH      Hypertension associated with diabetes    Chronic, controlled.  Latest blood pressure and vitals reviewed-   Temp:  [96 °F (35.6 °C)-97.5 °F (36.4 °C)]   Pulse:  [67-86]   Resp:  [14-22]   BP: (128-168)/(57-70)   SpO2:  [96 %-99 %] .   Home meds for hypertension were reviewed and noted below. Hospital anti-hypertensive changes were made as shown below.  Hypertension Medications             carvediloL (COREG) 25 MG tablet TAKE 2 TABLETS (50 MG TOTAL) BY MOUTH 2 (TWO) TIMES DAILY.    furosemide (LASIX) 40 MG tablet Take 1 tablet (40 mg total) by mouth every 8 (eight) hours as needed (swelling).    hydrALAZINE (APRESOLINE) 100 MG tablet Take 1 tablet (100 mg total) by mouth 2 (two) times daily.    nitroGLYCERIN (NITROSTAT) 0.4 MG SL tablet Place 0.4 mg under the tongue every 5 (five) minutes as needed for Chest pain.     valsartan (DIOVAN) 80 MG tablet Take 1 tablet (80 mg total) by mouth 2 (two) times daily.        Will utilize p.r.n. blood pressure medication only if patient's blood pressure greater than  180/110 and she develops symptoms such as worsening chest pain or shortness of breath.    -Continues to Hydralazine, Carvedilol  - Hold valsartan due to elevated Cr. Will adjust as needed    Biventricular ICD (implantable cardioverter-defibrillator) in place  Noted. Chronic. No reported issues.        VTE Risk Mitigation (From admission, onward)         Ordered     heparin (porcine) injection 5,000 Units  Every 8 hours         07/11/22 1544     IP VTE HIGH RISK PATIENT  Once         07/08/22 1949     Place sequential compression device  Until discontinued         07/08/22 1949                Discharge Planning   ALIX: 7/13/2022     Code Status: Partial Code   Is the patient medically ready for discharge?:     Reason for patient still in hospital (select all that apply): Treatment, Imaging and Consult recommendations  Discharge Plan A:  (TBD)                  Brayan SUTTON  SHELBI Frye  Department of [facility] Medicine   [place] [facility]

## 2022-07-12 NOTE — PLAN OF CARE
[facility] - Telemetry  Initial Discharge Assessment       Primary Care Provider: Dayton Michael MD    Admission Diagnosis: Cough [R05.9]  HCAP (healthcare-associated pneumonia) [J18.9]  Acute hypoxemic respiratory failure [J96.01]    Admission Date: 7/8/2022  Expected Discharge Date:     Pt SD from ICU. Pt lives alone in the home and has sons/daughter help as needed. Family transports to MD appt. Pt reports that she  was to have HH upon last admission. DME as listed below. Pending DC dispo plans.    0909 am - [facility] per last admission's medical records. Current recs for HH per therapy note. Pt does report some increased weakness.    1124 am - [place] updated daughter Betsy to DC plans via telephone.    Discharge Barriers Identified: None    Payor: HU[employer] MANAGED MEDICARE / Plan: HU[employer] TOTAL CARE ADVANTAGE / Product Type: Medicare Advantage /     Extended Emergency Contact Information  Primary Emergency Contact: Betsy Bowen  Address: [address] [place] [address]           [place] [address] [facility]  Home Phone: [phone]  Mobile Phone: [phone]  Relation: Daughter  Secondary Emergency Contact: Rigo Quinones  Mobile Phone: [phone]  Relation: Son    Discharge Plan A:  (TBD)  Discharge Plan B:  (TBD)      [id number] - [place] - [address] AT [facility] [address]  [address] [place] [address]  Phone: [phone] Fax: [phone]      Initial Assessment (most recent)       Adult Discharge Assessment - 07/12/22 0902          Discharge Assessment    Assessment Type Discharge Planning Assessment     Confirmed/corrected address, phone number and insurance Yes     Confirmed Demographics Correct on Facesheet     Source of Information patient     Lives With alone     Prior to hospitilization cognitive status: Alert/Oriented;No Deficits     Current cognitive status: Alert/Oriented;No Deficits     Walking or Climbing Stairs Difficulty ambulation difficulty, requires  "equipment     Dressing/Bathing Difficulty none     Equipment Currently Used at Home walker, rolling;cane, straight;shower chair;glucometer     Readmission within 30 days? Yes     Patient currently being followed by outpatient case management? No     Do you currently have service(s) that help you manage your care at home? Yes     Name and Contact number of agency Pt was to have HH but readmitted to hospital she said     Is the pt/caregiver preference to resume services with current agency Yes     Do you take prescription medications? Yes     Do you have any problems affording any of your prescribed medications? No     Is the patient taking medications as prescribed? yes     How do you get to doctors appointments? family or friend will provide     Are you on dialysis? No     Do you take coumadin? No     Discharge Plan A --   TBD    Discharge Plan B --   TBD    DME Needed Upon Discharge  none     Discharge Plan discussed with: Patient;Adult children     Discharge Barriers Identified None                        Future Appointments   Date Time Provider Department Center   7/20/2022  2:00 PM Leonides Fuentes MD [facility] DERM [facility]   7/26/2022 11:00 AM Catrachita Rothman MD [facility] IM [facility] PCW   8/6/2022 10:00 AM HOME MONITOR DEVICE CHECK, [facility] ARRHPRO [facility]   8/23/2022  9:45 AM Mansi Borrero MD [facility]   8/25/2022 11:30 AM Diaz Roche MD [facility] DERMSUR [facility]   8/26/2022 10:50 AM CARRIE Arechiga MD [facility] OPHTHAL [facility]   9/1/2022  9:00 AM Eric Rivera PA-C [facility] UROGYN [facility]   9/6/2022  1:30 PM Seng Salgado MD [facility] CARDIO [place]   9/26/2022  8:15 AM LAB, [facility] LAB [place]   10/3/2022  9:00 AM Elidia Madison NP [facility]   10/7/2022  9:00 AM Dayton Michael MD [facility] MED [facility]     BP (!) 151/66 (Patient Position: Lying)   Pulse 74   Temp 96.2 °F (35.7 °C) (Oral)   Resp 15   Ht 5' 3" (1.6 m)   Wt 59.5 kg (131 lb 2.8 oz)   LMP  (LMP Unknown)   " SpO2 96%   Breastfeeding No   BMI 23.24 kg/m²      albuterol-ipratropium  3 mL Nebulization Q6H WAKE    aspirin  81 mg Oral Daily    azithromycin  250 mg Oral Daily    carvediloL  6.25 mg Oral BID WM    ceFEPime (MAXIPIME) IVPB  2 g Intravenous Q24H    dextromethorphan-guaiFENesin  mg  1 tablet Oral BID    fluticasone furoate-vilanteroL  1 puff Inhalation Daily    fluticasone propionate  2 spray Each Nostril Daily    heparin (porcine)  5,000 Units Subcutaneous Q8H    hydrALAZINE  100 mg Oral BID    insulin aspart U-100  2 Units Subcutaneous TIDWM    insulin detemir U-100  8 Units Subcutaneous BID    lactobacillus acidophilus & bulgar  1 packet Oral BID    montelukast  10 mg Oral QHS    mupirocin   Nasal BID    polyethylene glycol  17 g Oral BID    pravastatin  40 mg Oral Daily

## 2022-07-12 NOTE — SUBJECTIVE & OBJECTIVE
Interval History: Seen at the bedside, no distress noted. She does complain of weakness. Will cont PT/OT efforts    Review of Systems   Constitutional:  Negative for fatigue and fever.   HENT:  Negative for trouble swallowing.    Respiratory:  Negative for cough and shortness of breath.    Cardiovascular:  Negative for chest pain.   Gastrointestinal:  Negative for diarrhea, nausea and vomiting.   Genitourinary:  Negative for hematuria.   Musculoskeletal:  Positive for gait problem and myalgias.   Skin:  Negative for wound.   Neurological:  Positive for weakness.   Hematological:  Does not bruise/bleed easily.   Psychiatric/Behavioral:  Negative for confusion.    Objective:     Vital Signs (Most Recent):  Temp: 96.9 °F (36.1 °C) (07/12/22 1644)  Pulse: 76 (07/12/22 1644)  Resp: 14 (07/12/22 1644)  BP: (!) 168/70 (07/12/22 1644)  SpO2: 96 % (07/12/22 1644)   Vital Signs (24h Range):  Temp:  [96 °F (35.6 °C)-97.5 °F (36.4 °C)] 96.9 °F (36.1 °C)  Pulse:  [67-86] 76  Resp:  [14-22] 14  SpO2:  [96 %-99 %] 96 %  BP: (128-168)/(57-70) 168/70     Weight: 59.5 kg (131 lb 2.8 oz)  Body mass index is 23.24 kg/m².  No intake or output data in the 24 hours ending 07/12/22 8233   Physical Exam  Vitals and nursing note reviewed.   Constitutional:       Appearance: She is ill-appearing.   HENT:      Head: Normocephalic and atraumatic.      Mouth/Throat:      Mouth: Mucous membranes are moist.   Eyes:      Extraocular Movements: Extraocular movements intact.   Cardiovascular:      Rate and Rhythm: Normal rate.   Pulmonary:      Effort: Pulmonary effort is normal. No respiratory distress.   Abdominal:      Tenderness: There is no abdominal tenderness. There is no guarding.   Genitourinary:     Comments: De La Cruz cath in place   Musculoskeletal:         General: Normal range of motion.      Cervical back: Normal range of motion.   Skin:     General: Skin is warm and dry.   Neurological:      Mental Status: She is alert and oriented to  person, place, and time.   Psychiatric:         Mood and Affect: Mood normal.       Significant Labs: All pertinent labs within the past 24 hours have been reviewed.    Significant Imaging: I have reviewed all pertinent imaging results/findings within the past 24 hours.

## 2022-07-12 NOTE — PLAN OF CARE
Patient received on    2Lpm NC with SpO2   97 %. Pt with no apparent distress noted. Will continue to monitor.

## 2022-07-12 NOTE — PLAN OF CARE
Problem: Occupational Therapy  Goal: Occupational Therapy Goal  Description: Goals to be met by: 8/10/22     Patient will increase functional independence with ADLs by performing:    LE Dressing with Modified [place].  Grooming while standing with Modified [place].  Toileting from toilet with Modified [place] for hygiene and clothing management.   Supine to sit with Modified [place].  Step transfer with Modified [place]  Toilet transfer to toilet with Modified [place].  Increased functional strength to WFL for self care skills and functional mobility.  Upper extremity exercise program x10 reps per handout, with independence.    Outcome: Ongoing, Progressing   Myesha Quinones is a 82 y.o. female with a medical diagnosis of Acute hypoxemic respiratory failure.  Performance deficits affecting function are weakness, impaired endurance, impaired self care skills, impaired functional mobilty, gait instability, impaired balance, impaired cardiopulmonary response to activity, impaired coordination. Pt found in bed, agreeable to OOB to chair only secondary to not feeling well.  RN present and aware.  Pt required Min A with bed mobility and transferred to chair with CGA.  Encouraged OOB for 2 hours if able to tolerate.  Continue OT services to address functional goals, progressing as able.

## 2022-07-12 NOTE — ASSESSMENT & PLAN NOTE
Mycobacterium avium complex      -Followed by ID, Dr. Amaya; was placed on Voriconazole therapy but unable to tolerate  - With recent pseudomonas in sputum. Completed treated with Cefepime for 5 days on the previous admission  - Is admitted for pneumonia with worsening CXR  - Appreciate Pulmonary

## 2022-07-12 NOTE — PT/OT/SLP PROGRESS
Occupational Therapy   Treatment    Name: Myesha Quinones  MRN: [id number]  Admitting Diagnosis:  Acute hypoxemic respiratory failure       Recommendations:     Discharge Recommendations: home, home health PT, home health OT and 24/7 assistance from family  Discharge Equipment Recommendations:  none  Barriers to discharge:  None    Assessment:     Myesha Quinones is a 82 y.o. female with a medical diagnosis of Acute hypoxemic respiratory failure.  Performance deficits affecting function are weakness, impaired endurance, impaired self care skills, impaired functional mobilty, gait instability, impaired balance, impaired cardiopulmonary response to activity, impaired coordination. Pt found in bed, agreeable to OOB to chair only secondary to not feeling well.  RN present and aware.  Pt required Min A with bed mobility and transferred to chair with CGA.  Encouraged OOB for 2 hours if able to tolerate.  Continue OT services to address functional goals, progressing as able.      Rehab Prognosis:  Good; patient would benefit from acute skilled OT services to address these deficits and reach maximum level of function.       Plan:     Patient to be seen 5 x/week to address the above listed problems via self-care/home management, therapeutic activities, therapeutic exercises  · Plan of Care Expires: 08/10/22  · Plan of Care Reviewed with: patient, son    Subjective     Pain/Comfort:  · Pain Rating 1: 0/10  · Pain Rating Post-Intervention 1: 0/10    Objective:     Communicated with: RN prior to session.  Patient found HOB elevated with peripheral IV, telemetry upon OT entry to room.    General Precautions: Standard, fall   Orthopedic Precautions:N/A   Braces: N/A  Respiratory Status: Nasal cannula, flow 2 L/min     Occupational Performance:     Bed Mobility:    · Patient completed Rolling/Turning to Right with minimum assistance and with side rail  · Patient completed Scooting/Bridging with stand by assistance to scoot  seated   · Patient completed Supine to Sit with minimum assistance and with side rail, HOB elevated, increased effort, vc's for effective technique    Functional Mobility/Transfers:  · Patient completed Sit <> Stand Transfer with contact guard assistance  with  hand-held assist   · Functional Mobility: Declined    Activities of Daily Living:  · Declined      [facility] 6 Click ADL: 20    Treatment & Education:  Pt declined any further tx secondary to not feeling well.  Pt reports being comfortable in chair and agreed to try and stay in chair a few hours.     Patient left up in chair with all lines intact, call button in reach, RN notified and son presentEducation:      GOALS:   Multidisciplinary Problems     Occupational Therapy Goals        Problem: Occupational Therapy    Goal Priority Disciplines Outcome Interventions   Occupational Therapy Goal     OT, PT/OT Ongoing, Progressing    Description: Goals to be met by: 8/10/22     Patient will increase functional independence with ADLs by performing:    LE Dressing with Modified [place].  Grooming while standing with Modified [place].  Toileting from toilet with Modified [place] for hygiene and clothing management.   Supine to sit with Modified [place].  Step transfer with Modified [place]  Toilet transfer to toilet with Modified [place].  Increased functional strength to WFL for self care skills and functional mobility.  Upper extremity exercise program x10 reps per handout, with independence.                     Time Tracking:     OT Date of Treatment: 07/12/22  OT Start Time: 1021  OT Stop Time: 1033  OT Total Time (min): 12 min    Billable Minutes:Therapeutic Activity 12 7/12/2022

## 2022-07-12 NOTE — PLAN OF CARE
Problem: Adult Inpatient Plan of Care  Goal: Plan of Care Review  Outcome: Ongoing, Progressing   Patient is alert, oriented X4. Care plan explained to patient, she verbalized understanding.     On 2L of nasal cannula, O2 sat maintain 96%, no respiratory distress noted. Infrequent dry cough noted. Scheduled mucinex given. Pt reported that she just felt tired. Deny chest pain and shortness of breath. On cardiac monitor, running paced sinus rhythm.     Deny nausea/vomiting. Refused Miralax, pt said she had two bowel movement for today, she didn't want to take miralax for today. Complaint generalized body achy, PRN tylenol given. Due medications given.     Maintain contact precaution, maintain fall risk precaution, bed in lowest position, bed alarm on. Call light/personal items in reach. De La Cruz catheter in place. Instructed patient call for help as needed. Will continue to monitor,

## 2022-07-12 NOTE — PROGRESS NOTES
RSW met with patient to discuss discharge and additional patient barriers to care. Pt identified no additional social barriers to care. Per pt, pt is not in need of resources at this time.    The following were addressed during this visit:  -Complete follow-up with patient    ORESTES Pederson

## 2022-07-12 NOTE — PLAN OF CARE
07/12/22 1441   Post-Acute Status   Post-Acute Authorization Other   Other Status Awaiting f/u Appts       Future Appointments   Date Time Provider Department Center   7/20/2022  2:00 PM Leonides Fuentes MD [facility] DERM [facility]   7/26/2022 11:00 AM Catrachita Rothman MD [facility] IM [facility] PCW   8/6/2022 10:00 AM HOME MONITOR DEVICE CHECK, [facility] ARRHPRO [facility]   8/23/2022  9:45 AM Mansi Borrero MD [facility]   8/25/2022 11:30 AM Diaz Roche MD [facility] DERMSUR [facility]   8/26/2022 10:50 AM CARRIE Arechiga MD [facility] OPHTHAL [facility]   9/1/2022  9:00 AM FAUSTINA Aguirre-BEHZAD [facility] UR[facility]YN [facility]   9/6/2022  1:30 PM Seng Salgado MD [facility] CARDIO [place]   9/26/2022  8:15 AM LAB, J[facility] LAB [place]   10/3/2022  9:00 AM Elidia Madison NP [facility]   10/7/2022  9:00 AM Dayton Michael MD [facility]

## 2022-07-12 NOTE — ASSESSMENT & PLAN NOTE
Chronic, controlled.  Latest blood pressure and vitals reviewed-   Temp:  [96 °F (35.6 °C)-97.5 °F (36.4 °C)]   Pulse:  [67-86]   Resp:  [14-22]   BP: (128-168)/(57-70)   SpO2:  [96 %-99 %] .   Home meds for hypertension were reviewed and noted below. Hospital anti-hypertensive changes were made as shown below.  Hypertension Medications             carvediloL (COREG) 25 MG tablet TAKE 2 TABLETS (50 MG TOTAL) BY MOUTH 2 (TWO) TIMES DAILY.    furosemide (LASIX) 40 MG tablet Take 1 tablet (40 mg total) by mouth every 8 (eight) hours as needed (swelling).    hydrALAZINE (APRESOLINE) 100 MG tablet Take 1 tablet (100 mg total) by mouth 2 (two) times daily.    nitroGLYCERIN (NITROSTAT) 0.4 MG SL tablet Place 0.4 mg under the tongue every 5 (five) minutes as needed for Chest pain.     valsartan (DIOVAN) 80 MG tablet Take 1 tablet (80 mg total) by mouth 2 (two) times daily.        Will utilize p.r.n. blood pressure medication only if patient's blood pressure greater than  180/110 and she develops symptoms such as worsening chest pain or shortness of breath.    -Continues to Hydralazine, Carvedilol  - Hold valsartan due to elevated Cr. Will adjust as needed

## 2022-07-12 NOTE — PLAN OF CARE
Problem: Physical Therapy  Goal: Physical Therapy Goal  Description: Goals to be met by: 2022     Patient will increase functional independence with mobility by performin. Supine to sit with Modified [place]  2. Sit to supine with Modified [place]  3. Sit to stand transfer with Supervision using Rolling Walker  4. Bed to chair transfer with Supervision using Rolling Walker  5. Gait  x 100 feet with Supervision using Rolling Walker.   6. Lower extremity exercise program x10 reps with independence    Outcome: Ongoing, Progressing

## 2022-07-12 NOTE — ASSESSMENT & PLAN NOTE
CT chest 7/10 with large L-sided pleural effusion with significant left lower lobe atelectasis  Pulmonology consulted. Pulm planned US/thoracentesis 7/11

## 2022-07-12 NOTE — ASSESSMENT & PLAN NOTE
See HAP  Will wean O2 as tolerated  Monitor vol status  Repeat CXR in AM - worsening CXR  Scheduled nebs q6hA  Added steroids - Steroids discontinued due to no wheezing/SOB on exam and hx uncontrolled blood glucose on steroids   ?covid hauler   - Pulmonology consulted - planned US/thoracentesis and sent cultures 7/11  - Improving - comfortable on RA with resting

## 2022-07-12 NOTE — PT/OT/SLP PROGRESS
Physical Therapy Treatment    Patient Name:  Myesha Quinones   MRN:  [id number]    Recommendations:     Discharge Recommendations:  home health PT   Discharge Equipment Recommendations: none   Barriers to discharge: decreased mobility,strength and endurance    Assessment:     Myesha Quinones is a 82 y.o. female admitted with a medical diagnosis of Acute hypoxemic respiratory failure.  She presents with the following impairments/functional limitations:  weakness, impaired endurance, impaired functional mobilty, gait instability, impaired balance, decreased upper extremity function, decreased lower extremity function, decreased ROM, impaired coordination, impaired cardiopulmonary response to activity,pt with good participation and requires assistance with all mobility at this time,pt has assistance at home and will benefit from  services upon discharge.    Rehab Prognosis: Fair; patient would benefit from acute skilled PT services to address these deficits and reach maximum level of function.    Recent Surgery: * No surgery found *      Plan:     During this hospitalization, patient to be seen   to address the identified rehab impairments via gait training, therapeutic activities, therapeutic exercises, neuromuscular re-education and progress toward the following goals:    · Plan of Care Expires:  08/09/22    Subjective     Chief Complaint: n/a  Patient/Family Comments/goals: pt feels very weak.  Pain/Comfort:  · Pain Rating 1: 0/10      Objective:     Communicated with nsg prior to session.  Patient found up in chair with oxygen, lauren catheter, peripheral IV upon PT entry to room.     General Precautions: Standard, fall   Orthopedic Precautions:N/A   Braces: N/A  Respiratory Status: Nasal cannula, flow 2 L/min     Functional Mobility:  · Transfers:     · Sit to Stand:  contact guard assistance with rolling walker  · Gait: amb ~45' with RW and SBA/CGA with O2 donned  · Balance: fair standing balance with  O2      AM-PAC 6 CLICK MOBILITY  Turning over in bed (including adjusting bedclothes, sheets and blankets)?: 3  Sitting down on and standing up from a chair with arms (e.g., wheelchair, bedside commode, etc.): 3  Moving from lying on back to sitting on the side of the bed?: 3  Moving to and from a bed to a chair (including a wheelchair)?: 3  Need to walk in hospital room?: 3  Climbing 3-5 steps with a railing?: 2  Basic Mobility Total Score: 17       Therapeutic Activities and Exercises: rest periods PRN with activity.       Patient left up in chair with all lines intact, call button in reach and nsg notified..    GOALS: see general POC  Multidisciplinary Problems     Physical Therapy Goals        Problem: Physical Therapy    Goal Priority Disciplines Outcome Goal Variances Interventions   Physical Therapy Goal     PT, PT/OT Ongoing, Progressing     Description: Goals to be met by: 2022     Patient will increase functional independence with mobility by performin. Supine to sit with Modified [place]  2. Sit to supine with Modified [place]  3. Sit to stand transfer with Supervision using Rolling Walker  4. Bed to chair transfer with Supervision using Rolling Walker  5. Gait  x 100 feet with Supervision using Rolling Walker.   6. Lower extremity exercise program x10 reps with independence                     Time Tracking:     PT Received On: 22  PT Start Time: 1412     PT Stop Time: 1436  PT Total Time (min): 24 min     Billable Minutes: Gait Training 14 and Therapeutic Activity 10    Treatment Type: Treatment  PT/PTA: PTA     PTA Visit Number: 1     2022

## 2022-07-12 NOTE — PLAN OF CARE
Reviewed Ms. Quinones's fluid studies and fluid appears exudative with a lymphocytic predominance of 72%. Will need to follow up on cytology. We will respectfully sign off.     Shilpa Armenta MD  Pulm/CC Fellow    Lidya Cardenas MD  [facility] Faculty

## 2022-07-12 NOTE — ASSESSMENT & PLAN NOTE
Patient is identified as having Combined Systolic and Diastolic heart failure that is Chronic. CHF is currently controlled. Latest ECHO performed and demonstrates- Results for orders placed during the hospital encounter of 06/20/22    Echo 6/1/22    Interpretation Summary  · The left ventricle is normal in size with concentric hypertrophy and normal systolic function.  · The estimated ejection fraction is 55%.  · Grade II left ventricular diastolic dysfunction.  · Normal right ventricular size with normal right ventricular systolic function.  · Severe left atrial enlargement.  · Mild mitral regurgitation.  · Mild to moderate tricuspid regurgitation.  · Intermediate central venous pressure (8 mmHg).  · The estimated PA systolic pressure is 56 mmHg.  · There is pulmonary hypertension.  · There is a left pleural effusion.  . Hold Furosemide and monitor clinical status closely. Monitor on telemetry. Patient is off CHF pathway.  Monitor strict Is&Os and daily weights.  Place on fluid restriction of 1.5 L. Continue to stress to patient importance of self efficacy and  on diet for CHF. Last BNP reviewed- and noted below   Recent Labs   Lab 07/10/22  1321   *       No intake or output data in the 24 hours ending 07/12/22 1487  - Monitor

## 2022-07-12 NOTE — PLAN OF CARE
Pt on documented O2, no respiratory distress noted. Will continue to monitor.  The proper method of use, as well as anticipated side effects, of this aerosol treatment are discussed and demonstrated to the patient.

## 2022-07-13 LAB
BACTERIA SPEC AEROBE CULT: ABNORMAL
BACTERIA SPEC AEROBE CULT: ABNORMAL
GRAM STN SPEC: ABNORMAL
H CAPSUL AG UR-MCNC: NOT DETECTED NG/ML
HISTOPLASMA ANTIGEN URINE: NOT DETECTED
L PNEUMO AG UR QL IA: NEGATIVE
POCT GLUCOSE: 168 MG/DL (ref 70–110)
POCT GLUCOSE: 212 MG/DL (ref 70–110)
POCT GLUCOSE: 215 MG/DL (ref 70–110)
POCT GLUCOSE: 215 MG/DL (ref 70–110)

## 2022-07-13 PROCEDURE — 11000001 HC ACUTE MED/SURG PRIVATE ROOM

## 2022-07-13 PROCEDURE — 25000003 PHARM REV CODE 250: Performed by: INTERNAL MEDICINE

## 2022-07-13 PROCEDURE — 97110 THERAPEUTIC EXERCISES: CPT

## 2022-07-13 PROCEDURE — 63600175 PHARM REV CODE 636 W HCPCS: Performed by: STUDENT IN AN ORGANIZED HEALTH CARE EDUCATION/TRAINING PROGRAM

## 2022-07-13 PROCEDURE — 27000221 HC OXYGEN, UP TO 24 HOURS

## 2022-07-13 PROCEDURE — 94640 AIRWAY INHALATION TREATMENT: CPT

## 2022-07-13 PROCEDURE — 94761 N-INVAS EAR/PLS OXIMETRY MLT: CPT

## 2022-07-13 PROCEDURE — 97110 THERAPEUTIC EXERCISES: CPT | Mod: CQ

## 2022-07-13 PROCEDURE — 97116 GAIT TRAINING THERAPY: CPT | Mod: CQ

## 2022-07-13 PROCEDURE — 63700000 PHARM REV CODE 250 ALT 637 W/O HCPCS: Performed by: INTERNAL MEDICINE

## 2022-07-13 PROCEDURE — 25000242 PHARM REV CODE 250 ALT 637 W/ HCPCS: Performed by: INTERNAL MEDICINE

## 2022-07-13 PROCEDURE — 97530 THERAPEUTIC ACTIVITIES: CPT

## 2022-07-13 PROCEDURE — 97535 SELF CARE MNGMENT TRAINING: CPT

## 2022-07-13 PROCEDURE — 27000207 HC ISOLATION

## 2022-07-13 PROCEDURE — 99900035 HC TECH TIME PER 15 MIN (STAT)

## 2022-07-13 PROCEDURE — 63600175 PHARM REV CODE 636 W HCPCS: Performed by: INTERNAL MEDICINE

## 2022-07-13 PROCEDURE — C9399 UNCLASSIFIED DRUGS OR BIOLOG: HCPCS | Performed by: NURSE PRACTITIONER

## 2022-07-13 PROCEDURE — 25000003 PHARM REV CODE 250: Performed by: NURSE PRACTITIONER

## 2022-07-13 RX ADMIN — FLUTICASONE FUROATE AND VILANTEROL TRIFENATATE 1 PUFF: 100; 25 POWDER RESPIRATORY (INHALATION) at 07:07

## 2022-07-13 RX ADMIN — HYDRALAZINE HYDROCHLORIDE 100 MG: 25 TABLET, FILM COATED ORAL at 08:07

## 2022-07-13 RX ADMIN — GUAIFENESIN AND DEXTROMETHORPHAN HYDROBROMIDE 1 TABLET: 600; 30 TABLET, EXTENDED RELEASE ORAL at 08:07

## 2022-07-13 RX ADMIN — POLYETHYLENE GLYCOL 3350 17 G: 17 POWDER, FOR SOLUTION ORAL at 08:07

## 2022-07-13 RX ADMIN — IPRATROPIUM BROMIDE AND ALBUTEROL SULFATE 3 ML: .5; 3 SOLUTION RESPIRATORY (INHALATION) at 06:07

## 2022-07-13 RX ADMIN — MUPIROCIN: 20 OINTMENT TOPICAL at 08:07

## 2022-07-13 RX ADMIN — INSULIN DETEMIR 8 UNITS: 100 INJECTION, SOLUTION SUBCUTANEOUS at 08:07

## 2022-07-13 RX ADMIN — AZITHROMYCIN MONOHYDRATE 250 MG: 250 TABLET ORAL at 08:07

## 2022-07-13 RX ADMIN — INSULIN ASPART 2 UNITS: 100 INJECTION, SOLUTION INTRAVENOUS; SUBCUTANEOUS at 04:07

## 2022-07-13 RX ADMIN — INSULIN ASPART 2 UNITS: 100 INJECTION, SOLUTION INTRAVENOUS; SUBCUTANEOUS at 07:07

## 2022-07-13 RX ADMIN — PRAVASTATIN SODIUM 40 MG: 40 TABLET ORAL at 08:07

## 2022-07-13 RX ADMIN — INSULIN ASPART 4 UNITS: 100 INJECTION, SOLUTION INTRAVENOUS; SUBCUTANEOUS at 11:07

## 2022-07-13 RX ADMIN — LACTOBACILLUS ACIDOPHILUS / LACTOBACILLUS BULGARICUS 1 EACH: 100 MILLION CFU STRENGTH GRANULES at 08:07

## 2022-07-13 RX ADMIN — HEPARIN SODIUM 5000 UNITS: 5000 INJECTION INTRAVENOUS; SUBCUTANEOUS at 04:07

## 2022-07-13 RX ADMIN — INSULIN ASPART 4 UNITS: 100 INJECTION, SOLUTION INTRAVENOUS; SUBCUTANEOUS at 08:07

## 2022-07-13 RX ADMIN — ACETAMINOPHEN 650 MG: 325 TABLET ORAL at 04:07

## 2022-07-13 RX ADMIN — BENZONATATE 100 MG: 100 CAPSULE ORAL at 08:07

## 2022-07-13 RX ADMIN — FLUTICASONE PROPIONATE 100 MCG: 50 SPRAY, METERED NASAL at 08:07

## 2022-07-13 RX ADMIN — MONTELUKAST 10 MG: 10 TABLET, FILM COATED ORAL at 08:07

## 2022-07-13 RX ADMIN — INSULIN ASPART 2 UNITS: 100 INJECTION, SOLUTION INTRAVENOUS; SUBCUTANEOUS at 11:07

## 2022-07-13 RX ADMIN — HEPARIN SODIUM 5000 UNITS: 5000 INJECTION INTRAVENOUS; SUBCUTANEOUS at 01:07

## 2022-07-13 RX ADMIN — IPRATROPIUM BROMIDE AND ALBUTEROL SULFATE 3 ML: .5; 3 SOLUTION RESPIRATORY (INHALATION) at 01:07

## 2022-07-13 RX ADMIN — CEFEPIME 2 G: 2 INJECTION, POWDER, FOR SOLUTION INTRAVENOUS at 08:07

## 2022-07-13 RX ADMIN — CARVEDILOL 6.25 MG: 6.25 TABLET, FILM COATED ORAL at 08:07

## 2022-07-13 RX ADMIN — INSULIN ASPART 4 UNITS: 100 INJECTION, SOLUTION INTRAVENOUS; SUBCUTANEOUS at 04:07

## 2022-07-13 RX ADMIN — CARVEDILOL 6.25 MG: 6.25 TABLET, FILM COATED ORAL at 04:07

## 2022-07-13 RX ADMIN — ASPIRIN 81 MG: 81 TABLET, CHEWABLE ORAL at 08:07

## 2022-07-13 RX ADMIN — IPRATROPIUM BROMIDE AND ALBUTEROL SULFATE 3 ML: .5; 3 SOLUTION RESPIRATORY (INHALATION) at 07:07

## 2022-07-13 RX ADMIN — INSULIN ASPART 1 UNITS: 100 INJECTION, SOLUTION INTRAVENOUS; SUBCUTANEOUS at 08:07

## 2022-07-13 RX ADMIN — DIPHENHYDRAMINE HYDROCHLORIDE 25 MG: 25 CAPSULE ORAL at 08:07

## 2022-07-13 RX ADMIN — HEPARIN SODIUM 5000 UNITS: 5000 INJECTION INTRAVENOUS; SUBCUTANEOUS at 08:07

## 2022-07-13 NOTE — PLAN OF CARE
Problem: Physical Therapy  Goal: Physical Therapy Goal  Description: Goals to be met by: 2022     Patient will increase functional independence with mobility by performin. Supine to sit with Modified [place]  2. Sit to supine with Modified [place]  3. Sit to stand transfer with Supervision using Rolling Walker  4. Bed to chair transfer with Supervision using Rolling Walker  5. Gait  x 100 feet with Supervision using Rolling Walker.   6. Lower extremity exercise program x10 reps with independence    Outcome: Ongoing, Progressing

## 2022-07-13 NOTE — PLAN OF CARE
Pt received on RA. SPO2 98%. No distress noted at time. Neb given with no adverse reactions noted.

## 2022-07-13 NOTE — ASSESSMENT & PLAN NOTE
Chronic, controlled.  Latest blood pressure and vitals reviewed-   Temp:  [96 °F (35.6 °C)-97.2 °F (36.2 °C)]   Pulse:  [65-88]   Resp:  [14-20]   BP: (139-182)/(60-72)   SpO2:  [94 %-99 %] .   Home meds for hypertension were reviewed and noted below. Hospital anti-hypertensive changes were made as shown below.  Hypertension Medications             carvediloL (COREG) 25 MG tablet TAKE 2 TABLETS (50 MG TOTAL) BY MOUTH 2 (TWO) TIMES DAILY.    furosemide (LASIX) 40 MG tablet Take 1 tablet (40 mg total) by mouth every 8 (eight) hours as needed (swelling).    hydrALAZINE (APRESOLINE) 100 MG tablet Take 1 tablet (100 mg total) by mouth 2 (two) times daily.    nitroGLYCERIN (NITROSTAT) 0.4 MG SL tablet Place 0.4 mg under the tongue every 5 (five) minutes as needed for Chest pain.     valsartan (DIOVAN) 80 MG tablet Take 1 tablet (80 mg total) by mouth 2 (two) times daily.        Will utilize p.r.n. blood pressure medication only if patient's blood pressure greater than  180/110 and she develops symptoms such as worsening chest pain or shortness of breath.    -Continues to Hydralazine, Carvedilol  - Hold valsartan due to elevated Cr. Will adjust as needed

## 2022-07-13 NOTE — PT/OT/SLP PROGRESS
Physical Therapy Treatment    Patient Name:  Myesha Quinones   MRN:  [id number]    Recommendations:     Discharge Recommendations:  home health PT   Discharge Equipment Recommendations: none   Barriers to discharge: decreased mobility,strength and endurance    Assessment:     Myesha Quinones is a 82 y.o. female admitted with a medical diagnosis of Acute hypoxemic respiratory failure.  She presents with the following impairments/functional limitations:  weakness, impaired endurance, impaired functional mobilty, gait instability, impaired balance, decreased lower extremity function, decreased ROM, impaired coordination,pt with good participation and remains with decreased strength and endurance,pt will benefit from  services upon discharge.    Rehab Prognosis: Good; patient would benefit from acute skilled PT services to address these deficits and reach maximum level of function.    Recent Surgery: * No surgery found *      Plan:     During this hospitalization, patient to be seen   to address the identified rehab impairments via gait training, therapeutic activities, therapeutic exercises, neuromuscular re-education and progress toward the following goals:    · Plan of Care Expires:  08/09/22    Subjective     Chief Complaint: n/a  Patient/Family Comments/goals: pt was feeling a little foggy today.  Pain/Comfort:  · Pain Rating 1: 0/10      Objective:     Communicated with nsg prior to session.  Patient found up in chair with lauren catheter, peripheral IV upon PT entry to room.     General Precautions: Standard, fall   Orthopedic Precautions:N/A   Braces: N/A  Respiratory Status: Room air     Functional Mobility:  · Transfers:     · Sit to Stand:  supervision with rolling walker  · Gait: amb ~90' with RW and SBA  · Balance: fair standing balance with RW      AM-PAC 6 CLICK MOBILITY  Turning over in bed (including adjusting bedclothes, sheets and blankets)?: 3  Sitting down on and standing up from a chair  with arms (e.g., wheelchair, bedside commode, etc.): 3  Moving from lying on back to sitting on the side of the bed?: 3  Moving to and from a bed to a chair (including a wheelchair)?: 3  Need to walk in hospital room?: 3  Climbing 3-5 steps with a railing?: 2  Basic Mobility Total Score: 17       Therapeutic Activities and Exercises: le seated ex's X 10-12 reps inc: ap,laq,hip flex.       Patient left up in chair with all lines intact, call button in reach, nsg notified and son present..    GOALS: see general POC  Multidisciplinary Problems     Physical Therapy Goals        Problem: Physical Therapy    Goal Priority Disciplines Outcome Goal Variances Interventions   Physical Therapy Goal     PT, PT/OT Ongoing, Progressing     Description: Goals to be met by: 2022     Patient will increase functional independence with mobility by performin. Supine to sit with Modified [place]  2. Sit to supine with Modified [place]  3. Sit to stand transfer with Supervision using Rolling Walker  4. Bed to chair transfer with Supervision using Rolling Walker  5. Gait  x 100 feet with Supervision using Rolling Walker.   6. Lower extremity exercise program x10 reps with independence                     Time Tracking:     PT Received On: 22  PT Start Time: 1404     PT Stop Time: 1428  PT Total Time (min): 24 min     Billable Minutes: Gait Training 15 and Therapeutic Exercise 9    Treatment Type: Treatment  PT/PTA: PTA     PTA Visit Number: 2     2022

## 2022-07-13 NOTE — ASSESSMENT & PLAN NOTE
Patient is identified as having Combined Systolic and Diastolic heart failure that is Chronic. CHF is currently controlled. Latest ECHO performed and demonstrates- Results for orders placed during the hospital encounter of 06/20/22    Echo 6/1/22    Interpretation Summary  · The left ventricle is normal in size with concentric hypertrophy and normal systolic function.  · The estimated ejection fraction is 55%.  · Grade II left ventricular diastolic dysfunction.  · Normal right ventricular size with normal right ventricular systolic function.  · Severe left atrial enlargement.  · Mild mitral regurgitation.  · Mild to moderate tricuspid regurgitation.  · Intermediate central venous pressure (8 mmHg).  · The estimated PA systolic pressure is 56 mmHg.  · There is pulmonary hypertension.  · There is a left pleural effusion.   Monitor on telemetry. Patient is off CHF pathway.  Monitor strict Is&Os and daily weights.  Place on fluid restriction of 1.5 L. Continue to stress to patient importance of self efficacy and  on diet for CHF. Last BNP reviewed- and noted below   Recent Labs   Lab 07/10/22  1321   *         Intake/Output Summary (Last 24 hours) at 7/13/2022 1119  Last data filed at 7/12/2022 2101  Gross per 24 hour   Intake --   Output 1400 ml   Net -1400 ml     - Monitor  - Resume furosemide

## 2022-07-13 NOTE — PT/OT/SLP PROGRESS
Occupational Therapy   Treatment    Name: Myesha Quinones  MRN: [id number]  Admitting Diagnosis:  Acute hypoxemic respiratory failure       Recommendations:     Discharge Recommendations: home health PT, home health OT  Discharge Equipment Recommendations:  none  Barriers to discharge:  None    Assessment:     Myesha Quinones is a 82 y.o. female with a medical diagnosis of Acute hypoxemic respiratory failure.  She presents with The primary encounter diagnosis was HCAP (healthcare-associated pneumonia). Diagnoses of Cough, Acute hypoxemic respiratory failure, Acute renal failure superimposed on stage 3 chronic kidney disease, unspecified acute renal failure type, unspecified whether stage 3a or 3b CKD, and Pleural effusion were also pertinent to this visit.  . Performance deficits affecting function are weakness, gait instability, impaired balance, impaired endurance, impaired self care skills, impaired functional mobilty, decreased safety awareness, impaired cardiopulmonary response to activity.     Pt progressing well towards goals. Somewhat tearful during session regarding hospitalization and being ill; complains of increased day time fatigue. Able to perform functional mobility and ADLs in room. Cont OT POC.     Rehab Prognosis:  Good; patient would benefit from acute skilled OT services to address these deficits and reach maximum level of function.       Plan:     Patient to be seen 5 x/week to address the above listed problems via self-care/home management, therapeutic activities, therapeutic exercises  · Plan of Care Expires: 08/10/22  · Plan of Care Reviewed with: patient, son    Subjective     Pain/Comfort:  · Pain Rating 1: 0/10    Objective:     Communicated with: nslenin prior to session.  General Precautions: Standard, fall   Orthopedic Precautions:N/A   Braces: N/A     Occupational Performance:     Bed Mobility:    · Patient completed Scooting/Bridging with stand by assistance  · Patient completed  Supine to Sit with stand by assistance     Functional Mobility/Transfers:  · Patient completed Sit <> Stand Transfer with contact guard assistance  with  rolling walker   · Patient completed Bed <> Chair Transfer using Step Transfer technique with contact guard assistance with rolling walker  · Functional Mobility: CGA- SBA with RW; cues for safety with RW management     Activities of Daily Living:  · Grooming: contact guard assistance in stance       [facility] 6 Click ADL: 20    Treatment & Education:  Pt found supine; reports increased fatigue   Bed mobility as above   Sat EOB; reports of dizziness; performed UE therex   Stood from EOB with RW; functional mobility performed in room ; reports of remaining dizziness; t/f to b/s chair for seated rest break   Performed UE therex; PCT present in room to check vitals; BP WFL   Stood and performed additional mobility in room for endurance trng   Returned to b/s chair  Stood and performed G&H axs with tray table placed in front of b/s chair   Assisted with opening containers of lunch tray per pt request       Patient left up in chair with all lines intact, call button in reach and nsg notifiedEducation:      GOALS:   Multidisciplinary Problems     Occupational Therapy Goals        Problem: Occupational Therapy    Goal Priority Disciplines Outcome Interventions   Occupational Therapy Goal     OT, PT/OT Ongoing, Progressing    Description: Goals to be met by: 8/10/22     Patient will increase functional independence with ADLs by performing:    LE Dressing with Modified [place].  Grooming while standing with Modified [place].  Toileting from toilet with Modified [place] for hygiene and clothing management.   Supine to sit with Modified [place].  Step transfer with Modified [place]  Toilet transfer to toilet with Modified [place].  Increased functional strength to WFL for self care skills and functional mobility.  Upper extremity exercise program x10  reps per handout, with independence.                     Time Tracking:     OT Date of Treatment: 07/13/22  OT Start Time: 1202  OT Stop Time: 1242  OT Total Time (min): 40 min    Billable Minutes:Self Care/Home Management 13  Therapeutic Activity 13  Therapeutic Exercise 14    OT/CRISS: OT     CRISS Visit Number: 0    7/13/2022

## 2022-07-13 NOTE — PLAN OF CARE
Problem: Occupational Therapy  Goal: Occupational Therapy Goal  Description: Goals to be met by: 8/10/22     Patient will increase functional independence with ADLs by performing:    LE Dressing with Modified [place].  Grooming while standing with Modified [place].  Toileting from toilet with Modified [place] for hygiene and clothing management.   Supine to sit with Modified [place].  Step transfer with Modified [place]  Toilet transfer to toilet with Modified [place].  Increased functional strength to WFL for self care skills and functional mobility.  Upper extremity exercise program x10 reps per handout, with independence.    Outcome: Ongoing, Progressing     Pt progressing well towards goals. Somewhat tearful during session regarding hospitalization and being ill; complains of increased day time fatigue. Able to perform functional mobility and ADLs in room. Cont OT POC.

## 2022-07-13 NOTE — PLAN OF CARE
Patient on room air with documented SpO2 and in no apparent distress. Will continue to monitor.     On room  air, weaned Pt

## 2022-07-13 NOTE — PLAN OF CARE
"   07/13/22 0906   Post-Acute Status   Post-Acute Authorization Home Health;Other   Home Health Status Pending Clinical Review  (Pt will need new HH orders to [place] with Adam/OHPAULINA SILVESTRE. Attending team notified.)   Other Status No Post-Acute Service Needs   Hospital Resources/Appts/Education Provided Appointments scheduled and added to AVS;Post-Acute resouces added to AVS   Discharge Delays Orders Needed   Discharge Plan   Discharge Plan A Home;Home with family;Home Health     Future Appointments   Date Time Provider Department Center   7/20/2022  2:00 PM Leonides Fuentes MD [facility] DERM [facility]   7/26/2022 11:00 AM Catrachita Rothman MD [facility] IM [facility] PCW   8/6/2022 10:00 AM HOME MONITOR DEVICE CHECK, [facility] ARRHPRO [facility]   8/23/2022  9:45 AM Mansi Borrero MD [facility]   8/25/2022 11:30 AM Diaz Roche MD [facility] DERMSUR [facility]   8/26/2022 10:50 AM CARRIE Arechiga MD [facility] OPHTHAL [facility]   9/1/2022  9:00 AM Eric Rivera PA-C [facility] UROGYN [facility]   9/6/2022  1:30 PM Seng Salgado MD [facility] CARDIO [place]   9/26/2022  8:15 AM LAB, JANES KENH LAB [place]   10/3/2022  9:00 AM Elidia Madison NP [facility] ENDOC [place]   10/7/2022  9:00 AM Dayton Michael MD [facility] MED [facility]     BP (!) 145/64 (Patient Position: Lying)   Pulse 67   Temp 97.2 °F (36.2 °C) (Oral)   Resp 16   Ht 5' 3" (1.6 m)   Wt 59.5 kg (131 lb 2.8 oz)   LMP  (LMP Unknown)   SpO2 (!) 94%   Breastfeeding No   BMI 23.24 kg/m²      albuterol-ipratropium  3 mL Nebulization Q6H WAKE    aspirin  81 mg Oral Daily    carvediloL  6.25 mg Oral BID WM    ceFEPime (MAXIPIME) IVPB  2 g Intravenous Q24H    dextromethorphan-guaiFENesin  mg  1 tablet Oral BID    fluticasone furoate-vilanteroL  1 puff Inhalation Daily    fluticasone propionate  2 spray Each Nostril Daily    heparin (porcine)  5,000 Units Subcutaneous Q8H    hydrALAZINE  100 mg Oral BID    insulin aspart U-100  2 Units Subcutaneous TIDWM    insulin " detemir U-100  8 Units Subcutaneous BID    lactobacillus acidophilus & bulgar  1 packet Oral BID    montelukast  10 mg Oral QHS    polyethylene glycol  17 g Oral BID    pravastatin  40 mg Oral Daily

## 2022-07-13 NOTE — SUBJECTIVE & OBJECTIVE
Interval History: Seen at the bedside, no distress noted. She does complain of weakness. Will cont PT/OT efforts    Review of Systems   Constitutional:  Negative for fatigue and fever.   HENT:  Negative for trouble swallowing.    Respiratory:  Negative for cough and shortness of breath.    Cardiovascular:  Negative for chest pain.   Gastrointestinal:  Negative for diarrhea, nausea and vomiting.   Genitourinary:  Negative for hematuria.   Musculoskeletal:  Positive for gait problem and myalgias.   Skin:  Negative for wound.   Neurological:  Positive for weakness.   Hematological:  Does not bruise/bleed easily.   Psychiatric/Behavioral:  Negative for confusion.    Objective:     Vital Signs (Most Recent):  Temp: 97.2 °F (36.2 °C) (07/13/22 0752)  Pulse: 67 (07/13/22 0800)  Resp: 16 (07/13/22 0800)  BP: (!) 145/64 (07/13/22 0752)  SpO2: (!) 94 % (07/13/22 0800)   Vital Signs (24h Range):  Temp:  [96 °F (35.6 °C)-97.2 °F (36.2 °C)] 97.2 °F (36.2 °C)  Pulse:  [65-88] 67  Resp:  [14-20] 16  SpO2:  [94 %-99 %] 94 %  BP: (139-182)/(60-72) 145/64     Weight: 59.5 kg (131 lb 2.8 oz)  Body mass index is 23.24 kg/m².    Intake/Output Summary (Last 24 hours) at 7/13/2022 1118  Last data filed at 7/12/2022 2101  Gross per 24 hour   Intake --   Output 1400 ml   Net -1400 ml        Physical Exam  Vitals and nursing note reviewed.   Constitutional:       Appearance: She is ill-appearing.   HENT:      Head: Normocephalic and atraumatic.      Mouth/Throat:      Mouth: Mucous membranes are moist.   Eyes:      Extraocular Movements: Extraocular movements intact.   Cardiovascular:      Rate and Rhythm: Normal rate.   Pulmonary:      Effort: Pulmonary effort is normal. No respiratory distress.   Abdominal:      Tenderness: There is no abdominal tenderness. There is no guarding.   Genitourinary:     Comments: De La Cruz cath in place   Musculoskeletal:         General: Normal range of motion.      Cervical back: Normal range of motion.    Skin:     General: Skin is warm and dry.   Neurological:      Mental Status: She is alert and oriented to person, place, and time.   Psychiatric:         Mood and Affect: Mood normal.       Significant Labs: All pertinent labs within the past 24 hours have been reviewed.    Significant Imaging: I have reviewed all pertinent imaging results/findings within the past 24 hours.

## 2022-07-13 NOTE — PLAN OF CARE
Problem: Adult Inpatient Plan of Care  Goal: Plan of Care Review  Outcome: Ongoing, Progressing   Patient is alert, oriented X4. Pt said overall she felt a little bit better. Care plan explained to patient, she verbalized understanding.     On 1L of nasal cannula, O2 sat maintain 97%, some shortness of breath with exertion. Productive cough noted and getting worse during the day. PRN mucinex and tessalon kellen given. On cardiac monitor, running paced sinus rhythm.     Deny nausea/vomiting/diarrhea. Complaint headache and general body achy, PRN tylenol given. Due medications given.     Pt has a bowel movement this morning.     Maintain contact precaution, maintain fall risk precaution, bed in lowest position, bed alarm on. Call light/personal items in reach. De La Cruz in place. Instructed patient call for help as needed. Will continue to monitor.

## 2022-07-13 NOTE — PLAN OF CARE
Pt received on 1  LPM NC. No distress noted at time. SPO2 97%. Neb given with no adverse reactions noted at time.

## 2022-07-13 NOTE — PROGRESS NOTES
[facility]  Progress Note    Patient Name: Myesha Quinones  MRN: [id number]  Patient Class: IP- Inpatient   Admission Date: 7/8/2022  Length of Stay: 5 days  Attending Physician: Caroline Anderson MD  Primary Care Provider: Dayton Michael MD        Subjective:     Principal Problem:Acute hypoxemic respiratory failure        HPI:  82F recently DC after being hospitalized for acute hypoxic respiratory failure due to COVID 19 PNA. Was brought to the ER with c/o several days of increasing weakness and lethargy. In the ER she was hypoxic on RA in the 80s which improved to 92-93 on NC. She was in no respiratory distress. Her CXR showed B/L infiltrates. She was given BSA Abx in the ER and  consulted for admission. SUNG ER MD.       Overview/Hospital Course:  Patient presented to the hospital for increasing weakness and lethargy s/p being discharged 8 days ago for COVID-19 pneumonia. Patient was admitted to the hospital medicine with acute on chronic hypoxic respiratory failure due to HAP. She was placed on broad spectrum antibiotics with Cefepime, Vancomycin, and Azithromycin, and IV steroids. Steroids was later discontinued due to clinically improvement. Pulmonology was consulted due to recurrent pneumonia and worsening CXR. Repeat AFB smear/culture and fungal culture pending. CT chest revealed large left effusion with significant left lower lobe atelectasis . Pulmonology performed US/thoracentesis at the bed side on 7/11 with 700 ml drainage and sample sent to lab for micro and cytology as well as fluid analysis. Still requiring O2 at 1-2 L NC.      Interval History: Seen at the bedside, no distress noted. She does complain of weakness. Will cont PT/OT efforts    Review of Systems   Constitutional:  Negative for fatigue and fever.   HENT:  Negative for trouble swallowing.    Respiratory:  Negative for cough and shortness of breath.    Cardiovascular:  Negative for chest pain.   Gastrointestinal:   Negative for diarrhea, nausea and vomiting.   Genitourinary:  Negative for hematuria.   Musculoskeletal:  Positive for gait problem and myalgias.   Skin:  Negative for wound.   Neurological:  Positive for weakness.   Hematological:  Does not bruise/bleed easily.   Psychiatric/Behavioral:  Negative for confusion.    Objective:     Vital Signs (Most Recent):  Temp: 97.2 °F (36.2 °C) (07/13/22 0752)  Pulse: 67 (07/13/22 0800)  Resp: 16 (07/13/22 0800)  BP: (!) 145/64 (07/13/22 0752)  SpO2: (!) 94 % (07/13/22 0800)   Vital Signs (24h Range):  Temp:  [96 °F (35.6 °C)-97.2 °F (36.2 °C)] 97.2 °F (36.2 °C)  Pulse:  [65-88] 67  Resp:  [14-20] 16  SpO2:  [94 %-99 %] 94 %  BP: (139-182)/(60-72) 145/64     Weight: 59.5 kg (131 lb 2.8 oz)  Body mass index is 23.24 kg/m².    Intake/Output Summary (Last 24 hours) at 7/13/2022 1118  Last data filed at 7/12/2022 2101  Gross per 24 hour   Intake --   Output 1400 ml   Net -1400 ml        Physical Exam  Vitals and nursing note reviewed.   Constitutional:       Appearance: She is ill-appearing.   HENT:      Head: Normocephalic and atraumatic.      Mouth/Throat:      Mouth: Mucous membranes are moist.   Eyes:      Extraocular Movements: Extraocular movements intact.   Cardiovascular:      Rate and Rhythm: Normal rate.   Pulmonary:      Effort: Pulmonary effort is normal. No respiratory distress.   Abdominal:      Tenderness: There is no abdominal tenderness. There is no guarding.   Genitourinary:     Comments: De La Cruz cath in place   Musculoskeletal:         General: Normal range of motion.      Cervical back: Normal range of motion.   Skin:     General: Skin is warm and dry.   Neurological:      Mental Status: She is alert and oriented to person, place, and time.   Psychiatric:         Mood and Affect: Mood normal.       Significant Labs: All pertinent labs within the past 24 hours have been reviewed.    Significant Imaging: I have reviewed all pertinent imaging results/findings within  the past 24 hours.      Assessment/Plan:      * Acute hypoxemic respiratory failure  See HAP  Will wean O2 as tolerated  Monitor vol status  Repeat CXR in AM - worsening CXR  Scheduled nebs q6hA  Added steroids - Steroids discontinued due to no wheezing/SOB on exam and hx uncontrolled blood glucose on steroids   ?covid hauler   - Pulmonology consulted - planned US/thoracentesis and sent cultures 7/11  - Improving - comfortable on RA with resting      Pleural effusion   CT chest 7/10 with large L-sided pleural effusion with significant left lower lobe atelectasis  Pulmonology consulted. Pulm planned US/thoracentesis 7/11      CKD (chronic kidney disease) stage 4, GFR 15-29 ml/min  Renal fxn at baseline, Scr slightly up but delta Cr unchanged  No nsaids or cox2 (-)  Improving  Monitor        HAP (hospital-acquired pneumonia)  BSA (Cefepime and Vancomycin)  Added Azithro to cover atypicals  If MRSA screen (-) can DC Vanc  RC  Trend PCT  Added steroids for Rx of inPt PNA with hypoxia. Steroids discontinued  Consulted Pulmonary due to worsening CXR   - Cultures pending. Pulmonology planning US/thoracentesis with sending cultures  - CT chest 7/10 with large L-sided pleural effusion with significant left lower lobe atelectasis     ACP (advance care planning)  DNI per Pt and dtr  Ok for chest compressions       Type 2 diabetes mellitus, with long-term current use of insulin  Basal + ISS  She's on steroids and her dtr stated that the last time she was on steroids she glu readings > 800.   Will monitor adjust based on trends  A1C 8.0 (7/8)  - Steroids discontinued  - AG elevated  - Added prandial insulin  - Episodes of hypoglycemia over night 7/10. Insulin therapy hold for now. 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 Amaya; was placed on Voriconazole therapy but unable to tolerate  - With recent pseudomonas in sputum. Completed treated  with Cefepime for 5 days on the previous admission  - Is admitted for pneumonia with worsening CXR  - Appreciate Pulmonary    Hyponatremia  Likely 2/2 lung pathology --> ?SIADH. Avoid isotonic saline  Min fluid restriction.   Jose Ashraf    Monitor vol status         Urinary retention  Patient with hx urinary retention s/p pessary. Was discharged home with De La Cruz on previous admission    - Continue De La Cruz on admit  - UA on admission no infection      Chronic combined systolic and diastolic heart failure    Patient is identified as having Combined Systolic and Diastolic heart failure that is Chronic. CHF is currently controlled. Latest ECHO performed and demonstrates- Results for orders placed during the hospital encounter of 06/20/22    Echo 6/1/22    Interpretation Summary  · The left ventricle is normal in size with concentric hypertrophy and normal systolic function.  · The estimated ejection fraction is 55%.  · Grade II left ventricular diastolic dysfunction.  · Normal right ventricular size with normal right ventricular systolic function.  · Severe left atrial enlargement.  · Mild mitral regurgitation.  · Mild to moderate tricuspid regurgitation.  · Intermediate central venous pressure (8 mmHg).  · The estimated PA systolic pressure is 56 mmHg.  · There is pulmonary hypertension.  · There is a left pleural effusion.   Monitor on telemetry. Patient is off CHF pathway.  Monitor strict Is&Os and daily weights.  Place on fluid restriction of 1.5 L. Continue to stress to patient importance of self efficacy and  on diet for CHF. Last BNP reviewed- and noted below   Recent Labs   Lab 07/10/22  1321   *         Intake/Output Summary (Last 24 hours) at 7/13/2022 1119  Last data filed at 7/12/2022 2101  Gross per 24 hour   Intake --   Output 1400 ml   Net -1400 ml     - Monitor  - Resume furosemide    Hyperlipidemia associated with type 2 diabetes mellitus   Patient is chronically on statin.will  continue for now. Monitor clinically. Last LDL was   Lab Results   Component Value Date    LDLCALC 29.8 (L) 04/03/2022          Weakness  PTOTSW  Fall precautions   PT/OT recommends Home with HH      Hypertension associated with diabetes    Chronic, controlled.  Latest blood pressure and vitals reviewed-   Temp:  [96 °F (35.6 °C)-97.2 °F (36.2 °C)]   Pulse:  [65-88]   Resp:  [14-20]   BP: (139-182)/(60-72)   SpO2:  [94 %-99 %] .   Home meds for hypertension were reviewed and noted below. Hospital anti-hypertensive changes were made as shown below.  Hypertension Medications             carvediloL (COREG) 25 MG tablet TAKE 2 TABLETS (50 MG TOTAL) BY MOUTH 2 (TWO) TIMES DAILY.    furosemide (LASIX) 40 MG tablet Take 1 tablet (40 mg total) by mouth every 8 (eight) hours as needed (swelling).    hydrALAZINE (APRESOLINE) 100 MG tablet Take 1 tablet (100 mg total) by mouth 2 (two) times daily.    nitroGLYCERIN (NITROSTAT) 0.4 MG SL tablet Place 0.4 mg under the tongue every 5 (five) minutes as needed for Chest pain.     valsartan (DIOVAN) 80 MG tablet Take 1 tablet (80 mg total) by mouth 2 (two) times daily.        Will utilize p.r.n. blood pressure medication only if patient's blood pressure greater than  180/110 and she develops symptoms such as worsening chest pain or shortness of breath.    -Continues to Hydralazine, Carvedilol  - Hold valsartan due to elevated Cr. Will adjust as needed    Biventricular ICD (implantable cardioverter-defibrillator) in place  Noted. Chronic. No reported issues.        VTE Risk Mitigation (From admission, onward)         Ordered     heparin (porcine) injection 5,000 Units  Every 8 hours         07/11/22 1544     IP VTE HIGH RISK PATIENT  Once         07/08/22 1949     Place sequential compression device  Until discontinued         07/08/22 1949                Discharge Planning   ALIX: 7/13/2022     Code Status: Partial Code   Is the patient medically ready for discharge?:     Reason for  patient still in hospital (select all that apply): Patient trending condition, Laboratory test, Treatment, Imaging, Consult recommendations and PT / OT recommendations  Discharge Plan A: Home, Home with family, Home Health   Discharge Delays: Orders Needed              Brayan Frye NP  Department of Hospital Medicine   [facility]

## 2022-07-14 ENCOUNTER — PATIENT MESSAGE (OUTPATIENT)
Dept: PULMONOLOGY | Facility: CLINIC | Age: 82
End: 2022-07-14
Payer: MEDICARE

## 2022-07-14 PROBLEM — J15.1 PNEUMONIA OF LEFT LOWER LOBE DUE TO PSEUDOMONAS SPECIES: Status: ACTIVE | Noted: 2022-07-14

## 2022-07-14 LAB
BACTERIA BLD CULT: NORMAL
BACTERIA BLD CULT: NORMAL
FUNGUS SPEC CULT: NORMAL
POCT GLUCOSE: 115 MG/DL (ref 70–110)
POCT GLUCOSE: 157 MG/DL (ref 70–110)
POCT GLUCOSE: 204 MG/DL (ref 70–110)
POCT GLUCOSE: 218 MG/DL (ref 70–110)

## 2022-07-14 PROCEDURE — 27000207 HC ISOLATION

## 2022-07-14 PROCEDURE — 25000003 PHARM REV CODE 250: Performed by: HOSPITALIST

## 2022-07-14 PROCEDURE — 25000242 PHARM REV CODE 250 ALT 637 W/ HCPCS: Performed by: INTERNAL MEDICINE

## 2022-07-14 PROCEDURE — 94640 AIRWAY INHALATION TREATMENT: CPT

## 2022-07-14 PROCEDURE — 63600175 PHARM REV CODE 636 W HCPCS: Performed by: HOSPITALIST

## 2022-07-14 PROCEDURE — 94761 N-INVAS EAR/PLS OXIMETRY MLT: CPT

## 2022-07-14 PROCEDURE — 25000003 PHARM REV CODE 250: Performed by: NURSE PRACTITIONER

## 2022-07-14 PROCEDURE — 63600175 PHARM REV CODE 636 W HCPCS: Performed by: STUDENT IN AN ORGANIZED HEALTH CARE EDUCATION/TRAINING PROGRAM

## 2022-07-14 PROCEDURE — 25000003 PHARM REV CODE 250: Performed by: INTERNAL MEDICINE

## 2022-07-14 PROCEDURE — 11000001 HC ACUTE MED/SURG PRIVATE ROOM

## 2022-07-14 PROCEDURE — 97116 GAIT TRAINING THERAPY: CPT | Mod: CQ

## 2022-07-14 PROCEDURE — 97110 THERAPEUTIC EXERCISES: CPT | Mod: CQ

## 2022-07-14 RX ORDER — FUROSEMIDE 40 MG/1
40 TABLET ORAL DAILY
Qty: 90 TABLET | Refills: 3 | Status: ON HOLD
Start: 2022-07-14 | End: 2024-03-11

## 2022-07-14 RX ORDER — CEFEPIME HYDROCHLORIDE 1 G/50ML
2 INJECTION, SOLUTION INTRAVENOUS ONCE
Status: COMPLETED | OUTPATIENT
Start: 2022-07-14 | End: 2022-07-14

## 2022-07-14 RX ADMIN — POLYETHYLENE GLYCOL 3350 17 G: 17 POWDER, FOR SOLUTION ORAL at 09:07

## 2022-07-14 RX ADMIN — LACTOBACILLUS ACIDOPHILUS / LACTOBACILLUS BULGARICUS 1 EACH: 100 MILLION CFU STRENGTH GRANULES at 09:07

## 2022-07-14 RX ADMIN — HEPARIN SODIUM 5000 UNITS: 5000 INJECTION INTRAVENOUS; SUBCUTANEOUS at 01:07

## 2022-07-14 RX ADMIN — INSULIN ASPART 4 UNITS: 100 INJECTION, SOLUTION INTRAVENOUS; SUBCUTANEOUS at 05:07

## 2022-07-14 RX ADMIN — INSULIN ASPART 2 UNITS: 100 INJECTION, SOLUTION INTRAVENOUS; SUBCUTANEOUS at 05:07

## 2022-07-14 RX ADMIN — INSULIN DETEMIR 8 UNITS: 100 INJECTION, SOLUTION SUBCUTANEOUS at 08:07

## 2022-07-14 RX ADMIN — IPRATROPIUM BROMIDE AND ALBUTEROL SULFATE 3 ML: .5; 3 SOLUTION RESPIRATORY (INHALATION) at 01:07

## 2022-07-14 RX ADMIN — MONTELUKAST 10 MG: 10 TABLET, FILM COATED ORAL at 09:07

## 2022-07-14 RX ADMIN — LACTOBACILLUS ACIDOPHILUS / LACTOBACILLUS BULGARICUS 1 EACH: 100 MILLION CFU STRENGTH GRANULES at 08:07

## 2022-07-14 RX ADMIN — HYDRALAZINE HYDROCHLORIDE 100 MG: 25 TABLET, FILM COATED ORAL at 09:07

## 2022-07-14 RX ADMIN — INSULIN ASPART 2 UNITS: 100 INJECTION, SOLUTION INTRAVENOUS; SUBCUTANEOUS at 11:07

## 2022-07-14 RX ADMIN — ASPIRIN 81 MG: 81 TABLET, CHEWABLE ORAL at 08:07

## 2022-07-14 RX ADMIN — IPRATROPIUM BROMIDE AND ALBUTEROL SULFATE 3 ML: .5; 3 SOLUTION RESPIRATORY (INHALATION) at 07:07

## 2022-07-14 RX ADMIN — HEPARIN SODIUM 5000 UNITS: 5000 INJECTION INTRAVENOUS; SUBCUTANEOUS at 09:07

## 2022-07-14 RX ADMIN — INSULIN DETEMIR 8 UNITS: 100 INJECTION, SOLUTION SUBCUTANEOUS at 09:07

## 2022-07-14 RX ADMIN — INSULIN ASPART 2 UNITS: 100 INJECTION, SOLUTION INTRAVENOUS; SUBCUTANEOUS at 08:07

## 2022-07-14 RX ADMIN — FLUTICASONE FUROATE AND VILANTEROL TRIFENATATE 1 PUFF: 100; 25 POWDER RESPIRATORY (INHALATION) at 07:07

## 2022-07-14 RX ADMIN — GUAIFENESIN AND DEXTROMETHORPHAN HYDROBROMIDE 1 TABLET: 600; 30 TABLET, EXTENDED RELEASE ORAL at 09:07

## 2022-07-14 RX ADMIN — ACETAMINOPHEN 650 MG: 325 TABLET ORAL at 03:07

## 2022-07-14 RX ADMIN — HEPARIN SODIUM 5000 UNITS: 5000 INJECTION INTRAVENOUS; SUBCUTANEOUS at 05:07

## 2022-07-14 RX ADMIN — INSULIN ASPART 4 UNITS: 100 INJECTION, SOLUTION INTRAVENOUS; SUBCUTANEOUS at 11:07

## 2022-07-14 RX ADMIN — HYDRALAZINE HYDROCHLORIDE 100 MG: 25 TABLET, FILM COATED ORAL at 08:07

## 2022-07-14 RX ADMIN — GUAIFENESIN AND DEXTROMETHORPHAN HYDROBROMIDE 1 TABLET: 600; 30 TABLET, EXTENDED RELEASE ORAL at 08:07

## 2022-07-14 RX ADMIN — FLUTICASONE PROPIONATE 100 MCG: 50 SPRAY, METERED NASAL at 08:07

## 2022-07-14 RX ADMIN — CARVEDILOL 6.25 MG: 6.25 TABLET, FILM COATED ORAL at 05:07

## 2022-07-14 RX ADMIN — CEFEPIME 2 G: 2 INJECTION, POWDER, FOR SOLUTION INTRAVENOUS at 03:07

## 2022-07-14 RX ADMIN — CARVEDILOL 6.25 MG: 6.25 TABLET, FILM COATED ORAL at 08:07

## 2022-07-14 RX ADMIN — POLYETHYLENE GLYCOL 3350 17 G: 17 POWDER, FOR SOLUTION ORAL at 08:07

## 2022-07-14 RX ADMIN — PRAVASTATIN SODIUM 40 MG: 40 TABLET ORAL at 08:07

## 2022-07-14 NOTE — ASSESSMENT & PLAN NOTE
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  - Pulmonology consulted - planned US/thoracentesis and sent cultures 7/11  - Improving - comfortable on RA with resting.  Completed IV cefepime treatment course for Pseudomonas pneumonia.  Did not need home oxygen after home oxygen testing was completed.  Patient set up with home health on discharge.

## 2022-07-14 NOTE — PLAN OF CARE
"   07/14/22 0933   Post-Acute Status   Post-Acute Authorization Home Health;Other   Home Health Status Pending Clinical Review  (Pt will need new HH orders to [place] with [place] [facility]. Attending team notified.)   Other Status No Post-Acute Service Needs   Hospital Resources/Appts/Education Provided Appointments scheduled and added to AVS;Post-Acute resouces added to AVS   Discharge Delays Orders Needed   Discharge Plan   Discharge Plan A Home;Home with family;Home Health     Future Appointments   Date Time Provider Department Center   7/20/2022  2:00 PM Leonides Fuentes MD [facility] DERM [facility]   7/26/2022 11:00 AM Catrachita Rothman MD [facility] IM [facility] PCW   8/6/2022 10:00 AM HOME MONITOR DEVICE CHECK, [facility] ARRHPRO [facility]   8/23/2022  9:45 AM Mansi Borrero MD [facility]   8/25/2022 11:30 AM Diaz Roche MD [facility] DERMSUR [facility]   8/26/2022 10:50 AM CARRIE Arechiga MD [facility] OPHTHAL [facility]   9/1/2022  9:00 AM Eric Rivera PA-C [facility] UROGYN [facility]   9/6/2022  1:30 PM Seng Salgado MD [facility] CARDIO [place]   9/26/2022  8:15 AM LAB, [facility] [place]   10/3/2022  9:00 AM Elidia Madison NP [facility] ENDOC [place]   10/7/2022  9:00 AM Dayton Michael MD [facility] MED [facility]     BP (!) 151/67 (BP Location: Left arm, Patient Position: Lying)   Pulse 73   Temp 96.8 °F (36 °C) (Oral)   Resp 16   Ht 5' 3" (1.6 m)   Wt 59.5 kg (131 lb 2.8 oz)   LMP  (LMP Unknown)   SpO2 (!) 94%   Breastfeeding No   BMI 23.24 kg/m²        albuterol-ipratropium  3 mL Nebulization Q6H WAKE    aspirin  81 mg Oral Daily    carvediloL  6.25 mg Oral BID WM    ceFEPime (MAXIPIME) IVPB  2 g Intravenous Q24H    dextromethorphan-guaiFENesin  mg  1 tablet Oral BID    fluticasone furoate-vilanteroL  1 puff Inhalation Daily    fluticasone propionate  2 spray Each Nostril Daily    heparin (porcine)  5,000 Units Subcutaneous Q8H    hydrALAZINE  100 mg Oral BID    insulin aspart U-100  2 Units " Subcutaneous TIDWM    insulin detemir U-100  8 Units Subcutaneous BID    lactobacillus acidophilus & bulgar  1 packet Oral BID    montelukast  10 mg Oral QHS    polyethylene glycol  17 g Oral BID    pravastatin  40 mg Oral Daily

## 2022-07-14 NOTE — ASSESSMENT & PLAN NOTE
Patient is identified as having Combined Systolic and Diastolic heart failure that is Chronic. CHF is currently controlled. Latest ECHO performed and demonstrates- Results for orders placed during the hospital encounter of 06/20/22    Echo 6/1/22    Interpretation Summary  · The left ventricle is normal in size with concentric hypertrophy and normal systolic function.  · The estimated ejection fraction is 55%.  · Grade II left ventricular diastolic dysfunction.  · Normal right ventricular size with normal right ventricular systolic function.  · Severe left atrial enlargement.  · Mild mitral regurgitation.  · Mild to moderate tricuspid regurgitation.  · Intermediate central venous pressure (8 mmHg).  · The estimated PA systolic pressure is 56 mmHg.  · There is pulmonary hypertension.  · There is a left pleural effusion.    Repeat labs to check sodium and creatinine, and restart losartan and furosemide accordingly

## 2022-07-14 NOTE — ASSESSMENT & PLAN NOTE
CT chest 7/10 with large L-sided pleural effusion with significant left lower lobe atelectasis  Pulmonology consulted.s/p US/thoracentesis 7/11

## 2022-07-14 NOTE — PT/OT/SLP PROGRESS
Physical Therapy Treatment    Patient Name:  Myesha Quinones   MRN:  [id number]    Recommendations:     Discharge Recommendations:  home health PT   Discharge Equipment Recommendations: none   Barriers to discharge: decreased mobility,strength and endurance    Assessment:     Myesha Quinones is a 82 y.o. female admitted with a medical diagnosis of Acute hypoxemic respiratory failure.  She presents with the following impairments/functional limitations:  weakness, impaired endurance, impaired functional mobilty, gait instability, impaired balance, decreased lower extremity function, decreased ROM, impaired coordination,pt with improving mobility and endurance and will still benefit from HH services upon discharge to address above functional limitations.    Rehab Prognosis: Good; patient would benefit from acute skilled PT services to address these deficits and reach maximum level of function.    Recent Surgery: * No surgery found *      Plan:     During this hospitalization, patient to be seen   to address the identified rehab impairments via gait training, therapeutic activities, therapeutic exercises, neuromuscular re-education and progress toward the following goals:    · Plan of Care Expires:  08/09/22    Subjective     Chief Complaint: n/a  Patient/Family Comments/goals: pt agreeable to up in chair.  Pain/Comfort:  · Pain Rating 1: 0/10      Objective:     Communicated with nsg prior to session.  Patient found supine with lauren catheter, peripheral IV upon PT entry to room.     General Precautions: Standard, fall   Orthopedic Precautions:N/A   Braces: N/A  Respiratory Status: Room air     Functional Mobility:  · Bed Mobility:     · Supine to Sit: modified independence and supervision  · Transfers:     · Sit to Stand:  stand by assistance with rolling walker  · Bed to Chair: stand by assistance with  rolling walker  using  ambulation  · Gait: amb ~100' with RW and SBA  · Balance: fair standing balance with  RW      AM-PAC 6 CLICK MOBILITY  Turning over in bed (including adjusting bedclothes, sheets and blankets)?: 3  Sitting down on and standing up from a chair with arms (e.g., wheelchair, bedside commode, etc.): 3  Moving from lying on back to sitting on the side of the bed?: 3  Moving to and from a bed to a chair (including a wheelchair)?: 3  Need to walk in hospital room?: 3  Climbing 3-5 steps with a railing?: 2  Basic Mobility Total Score: 17       Therapeutic Activities and Exercises: le supine ex's X 10-12 reps inc: ap,hs,abd/add,slr.       Patient left up in chair with all lines intact and call button in reach..    GOALS: see general POC  Multidisciplinary Problems     Physical Therapy Goals        Problem: Physical Therapy    Goal Priority Disciplines Outcome Goal Variances Interventions   Physical Therapy Goal     PT, PT/OT Ongoing, Progressing     Description: Goals to be met by: 2022     Patient will increase functional independence with mobility by performin. Supine to sit with Modified [place]  2. Sit to supine with Modified [place]  3. Sit to stand transfer with Supervision using Rolling Walker  4. Bed to chair transfer with Supervision using Rolling Walker  5. Gait  x 100 feet with Supervision using Rolling Walker.   6. Lower extremity exercise program x10 reps with independence                     Time Tracking:     PT Received On: 22  PT Start Time: 824     PT Stop Time: 849  PT Total Time (min): 25 min     Billable Minutes: Gait Training 15 and Therapeutic Exercise 10    Treatment Type: Treatment  PT/PTA: PTA     PTA Visit Number: 3     2022

## 2022-07-14 NOTE — NURSING
Home Oxygen Evaluation    Date Performed: 2022    1) Patient's Home O2 Sat on room air, while at rest: 93%        If O2 sats on room air at rest are 88% or below, patient qualifies. No additional testing needed. Document N/A in steps 2 and 3. If 89% or above, complete steps 2.      2) Patient's O2 Sat on room air while exercisin%        If O2 sats on room air while exercising remain 89% or above patient does not qualify, no further testing needed Document N/A in step 3. If O2 sats on room air while exercising are 88% or below, continue to step 3.      3) Patient's O2 Sat while exercising on O2: 93% at 0 LPM         (Must show improvement from #2 for patients to qualify)    If O2 sats improve on oxygen, patient qualifies for portable oxygen. If not, the patient does not qualify.

## 2022-07-14 NOTE — PLAN OF CARE
[facility] - Telemetry      HOME HEALTH ORDERS  FACE TO FACE ENCOUNTER    Patient Name: Myesha Quinones  YOB: 1940    PCP: Dayton Michael MD   PCP Address: [address] / [place] [address]  PCP Phone Number: [phone]  PCP Fax: [phone]    Encounter Date: 7/8/22    Admit to Home Health    Diagnoses:  Active Hospital Problems    Diagnosis  POA    *Acute hypoxemic respiratory failure [J96.01]  Yes    Pneumonia of left lower lobe due to Pseudomonas species [J15.1]  Yes    Pleural effusion [J90]  Yes    ACP (advance care planning) [Z71.89]  Not Applicable    HAP (hospital-acquired pneumonia) [J18.9, Y95]  Yes    CKD (chronic kidney disease) stage 4, GFR 15-29 ml/min [N18.4]  Yes    Type 2 diabetes mellitus, with long-term current use of insulin [E11.9, Z79.4]  Not Applicable    Hyponatremia [E87.1]  Yes    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  Lauren placed 2/28/22.   --recurrent retention, lauren replaced 4/1/22  --Pessary placed per urogyn and lauren removed  --admitted 6/2022 and incomplete emptying despite pessary      Chronic combined systolic and diastolic heart failure [I50.42]  Yes    Hyperlipidemia associated with type 2 diabetes mellitus [E11.69, E78.5]  Yes    Weakness [R53.1]  Yes    Hypertension associated with diabetes [E11.59, I15.2]  Yes    Biventricular ICD (implantable cardioverter-defibrillator) in place [Z95.810]  Yes      Resolved Hospital Problems   No resolved problems to display.       Follow Up Appointments:  Future Appointments   Date Time Provider Department Center   7/20/2022  2:00 PM Leonides Fuentes MD [facility] DERM [address]   7/21/2022  1:20 PM Dayton Michael MD [facility] [place] [facility]   7/26/2022 11:00 AM Catrachita Rothman MD [facility] [address] [facility]   8/6/2022 10:00 AM HOME MONITOR DEVICE CHECK, [facility] NANDA [address]   8/23/2022  9:45 AM Mansi  MD [facility]   8/25/2022 11:30 AM Diaz Roche MD [facility] DERMSUR [address]   8/26/2022 10:50 AM CARRIE Arechiga MD [facility] OPHTHAL [address]   9/1/2022  9:00 AM Eric Rivera PA-C [facility]OGYN [facility]   9/6/2022  1:30 PM Seng Salgado MD [facility] CARDIO [place]   9/26/2022  8:15 AM LABJANES LAB [place]   10/3/2022  9:00 AM Elidia Madison NP [facility] [place]   10/7/2022  9:00 AM Dayton Michael MD [facility]       Allergies:  Review of patient's allergies indicates:   Allergen Reactions    Iodine and iodide containing products Hives    Nifedipine      weakness       Medications:   Current Discharge Medication List      CONTINUE these medications which have CHANGED    Details   furosemide (LASIX) 40 MG tablet Take 1 tablet (40 mg total) by mouth once daily.  Qty: 90 tablet, Refills: 3    Associated Diagnoses: Cardiomyopathy, ischemic; Chronic combined systolic (congestive) and diastolic (congestive) heart failure         CONTINUE these medications which have NOT CHANGED    Details   acetaminophen (TYLENOL) 500 MG tablet Take 500 mg by mouth once daily.      albuterol (PROVENTIL/VENTOLIN HFA) 90 mcg/actuation inhaler INHALE 2 PUFFS INTO THE LUNGS EVERY 6 (SIX) HOURS AS NEEDED FOR WHEEZING. RESCUE  Qty: 18 g, Refills: 12    Associated Diagnoses: Cough      albuterol-ipratropium (DUO-NEB) 2.5 mg-0.5 mg/3 mL nebulizer solution TAKE 3 MLS BY NEBULIZATION EVERY 4 (FOUR) HOURS AS NEEDED FOR WHEEZING.  Qty: 270 mL, Refills: 12      aspirin 81 MG Chew Take 81 mg by mouth once daily.      benzonatate (TESSALON) 100 MG capsule Take 1 capsule (100 mg total) by mouth 3 (three) times daily as needed for Cough.  Qty: 20 capsule, Refills: 0    Associated Diagnoses: Cough      carvediloL (COREG) 25 MG tablet TAKE 2 TABLETS (50 MG TOTAL) BY MOUTH 2 (TWO) TIMES DAILY.  Qty: 360 tablet, Refills: 3    Comments: .      cholecalciferol, vitamin D3, (VITAMIN D3) 50 mcg (2,000 unit)  Tab Take 1 tablet by mouth once daily.       cyanocobalamin (VITAMIN B-12) 100 MCG tablet Take 100 mcg by mouth once daily.      estradioL (ESTRACE) 0.01 % (0.1 mg/gram) vaginal cream Place 1 g vaginally every Mon, Wed, Fri.  Qty: 153 g, Refills: 6      fluticasone propionate (FLONASE) 50 mcg/actuation nasal spray 2 sprays (100 mcg total) by Each Nostril route once daily.  Qty: 16 g, Refills: 12      fluticasone-salmeterol diskus inhaler 250-50 mcg Inhale 1 puff into the lungs as needed. Controller      hydrALAZINE (APRESOLINE) 100 MG tablet Take 1 tablet (100 mg total) by mouth 2 (two) times daily.  Qty: 180 tablet, Refills: 3    Comments: .  Associated Diagnoses: Essential hypertension      insulin (LANTUS SOLOSTAR U-100 INSULIN) glargine 100 units/mL (3mL) SubQ pen Inject 22 Units into the skin once daily.  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  Refills: 0      montelukast (SINGULAIR) 10 mg tablet Take 1 tablet (10 mg total) by mouth every evening.  Qty: 90 tablet, Refills: 3    Associated Diagnoses: Mild intermittent chronic asthma without complication      nitroGLYCERIN (NITROSTAT) 0.4 MG SL tablet Place 0.4 mg under the tongue every 5 (five) minutes as needed for Chest pain.       omega-3 fatty acids 500 mg Cap Take 1 capsule by mouth Daily.      polyethylene glycol (GLYCOLAX) 17 gram PwPk Take 17 g by mouth daily as needed (constipation).  Qty: 10 each, Refills:  "1      pravastatin (PRAVACHOL) 40 MG tablet TAKE 1 TABLET BY MOUTH EVERY DAY  Qty: 90 tablet, Refills: 1    Associated Diagnoses: Mixed hyperlipidemia      sodium bicarbonate 650 MG tablet Take 2 tablets (1,300 mg total) by mouth 3 (three) times daily.  Qty: 180 tablet, Refills: 11    Associated Diagnoses: CKD (chronic kidney disease) stage 4, GFR 15-29 ml/min      valsartan (DIOVAN) 80 MG tablet Take 1 tablet (80 mg total) by mouth 2 (two) times daily.  Qty: 180 tablet, Refills: 3    Comments: .      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 Use twice daily at 8 AM and 3 PM and record the value in MyChart as directed.  Qty: 1 each, Refills: 0    Comments: This is a NO CHARGE item.  Please override price to zero.  DO NOT PRINT.  NORMAL MODE e-PRESCRIBE ONLY.               I have seen and examined this patient within the last 30 days. My clinical findings that support the need for the home health skilled services and home bound status are the following:no   Weakness/numbness causing balance and gait disturbance due to Heart Failure, Coronary Heart Disease, COPD Exacerbation and " Weakness/Debility making it taxing to leave home.  Requiring assistive device to leave home due to unsteady gait caused by  Heart Failure, Coronary Artery Disease, COPD Exacerbation and Weakness/Debility.  Patient with medication mismanagement issues requiring home bound status as evidenced by  Unstable vital signs (blood pressure, heart rate), Poor understanding of medication regimen/dosage, Poor adherence to medication regimen/dosage and Uncontrolled hyperglycemic/hypoglycemic events.     Diet:   fluid restriction and 2 gram sodium diet    Labs:  SN to perform labs:  BMP: Weekly; 3 week(s)   Please send results to PCP     Referrals/ Consults  Physical Therapy to evaluate and treat. Evaluate for home safety and equipment needs; Establish/upgrade home exercise program. Perform / instruct on therapeutic exercises, gait training, transfer training, and Range of Motion.  Occupational Therapy to evaluate and treat. Evaluate home environment for safety and equipment needs. Perform/Instruct on transfers, ADL training, ROM, and therapeutic exercises.    Activities:   activity as tolerated    Nursing:   Agency to admit patient within 24 hours - 48 hours of hospital discharge unless specified on physician order or at patient request    SN to complete comprehensive assessment including routine vital signs. Instruct on disease process and s/s of complications to report to MD. Review/verify medication list sent home with the patient at time of discharge  and instruct patient/caregiver as needed. Frequency may be adjusted depending on start of care date.     Skilled nurse to perform up to 3 visits PRN for symptoms related to diagnosis    Notify MD if SBP > 160 or < 90; DBP > 90 or < 50; HR > 120 or < 50; Temp > 101; O2 < 88%; Other:       Ok to schedule additional visits based on staff availability and patient request on consecutive days within the home health episode.    When multiple disciplines ordered:    Start of Care  occurs on Sunday - Wednesday schedule remaining discipline evaluations as ordered on separate consecutive days following the start of care.    Thursday SOC -schedule subsequent evaluations Friday and Monday the following week.     Friday - Saturday SOC - schedule subsequent discipline evaluations on consecutive days starting Monday of the following week.      Miscellaneous   Diabetic Care:   SN to perform and educate Diabetic management with blood glucose monitoring:, Fingerstick blood sugar AC and HS and Report CBG < 60 or > 350 to physician.    Home Health Aide:  Physical Therapy Three times weekly and Occupational Therapy Three times weekly    Wound Care Orders  no    I certify that this patient is confined to her home and needs intermittent skilled nursing care, physical therapy and occupational therapy.

## 2022-07-14 NOTE — SUBJECTIVE & OBJECTIVE
Interval History:   Patient was weaned down to room air today, she underwent home oxygen testing and did not qualify for it.  She remained on room air draw the day.  Worked with Physical therapy who recommended home health discharge.  She finished her 7th day of IV cefepime.  Pleural fluid cultures are still negative growth to date.  Spoke with patient's daughter, who stated that patient is not ready for discharge given persistent fatigue.  Patient also lives alone , and family would need additional time to prepare for discharge and to arrange for additional help for the patient at home.  Discharge order was taken out.  Will attempt a voiding trial to ensure patient can urinate prior to discharge.  Potential tentative discharge home with home health tomorrow.      Review of Systems   Constitutional:  Positive for activity change, appetite change and fatigue. Negative for fever.   HENT:  Negative for congestion.    Eyes:  Negative for visual disturbance.   Respiratory:  Negative for cough, shortness of breath and wheezing.    Cardiovascular:  Negative for chest pain, palpitations and leg swelling.   Gastrointestinal:  Negative for abdominal distention, abdominal pain, constipation, diarrhea, nausea and vomiting.   Genitourinary:  Negative for decreased urine volume and difficulty urinating.   Musculoskeletal:  Negative for arthralgias and myalgias.   Neurological:  Positive for weakness. Negative for dizziness and syncope.   Psychiatric/Behavioral:  Negative for agitation and confusion.    Objective:     Vital Signs (Most Recent):  Temp: 98.2 °F (36.8 °C) (07/14/22 1542)  Pulse: 75 (07/14/22 1542)  Resp: 18 (07/14/22 1542)  BP: (!) 167/65 (07/14/22 1542)  SpO2: (!) 94 % (07/14/22 1542)   Vital Signs (24h Range):  Temp:  [96.7 °F (35.9 °C)-98.2 °F (36.8 °C)] 98.2 °F (36.8 °C)  Pulse:  [72-80] 75  Resp:  [16-20] 18  SpO2:  [92 %-98 %] 94 %  BP: (126-172)/(58-74) 167/65     Weight: 59.5 kg (131 lb 2.8 oz)  Body mass  index is 23.24 kg/m².    Intake/Output Summary (Last 24 hours) at 7/14/2022 1756  Last data filed at 7/14/2022 0600  Gross per 24 hour   Intake 150 ml   Output 950 ml   Net -800 ml      Physical Exam  Constitutional:       General: She is not in acute distress.     Appearance: Normal appearance. She is well-developed.      Comments: Appears weakn   HENT:      Head: Normocephalic and atraumatic.   Eyes:      Pupils: Pupils are equal, round, and reactive to light.   Pulmonary:      Effort: Pulmonary effort is normal. No respiratory distress.   Abdominal:      General: Abdomen is flat. There is no distension.      Palpations: Abdomen is soft.   Musculoskeletal:         General: Normal range of motion.      Cervical back: Neck supple.   Skin:     General: Skin is warm and dry.   Neurological:      General: No focal deficit present.      Mental Status: She is alert and oriented to person, place, and time.   Psychiatric:         Mood and Affect: Mood normal.         Behavior: Behavior normal.       Significant Labs: All pertinent labs within the past 24 hours have been reviewed.  CBC: No results for input(s): WBC, HGB, HCT, PLT in the last 48 hours.  CMP: No results for input(s): NA, K, CL, CO2, GLU, BUN, CREATININE, CALCIUM, PROT, ALBUMIN, BILITOT, ALKPHOS, AST, ALT, ANIONGAP, EGFRNONAA in the last 48 hours.    Invalid input(s): ESTGFAFRICA    Significant Imaging: I have reviewed all pertinent imaging results/findings within the past 24 hours.

## 2022-07-14 NOTE — CONSULTS
Thank you for your consult to [facility]. We have reviewed the patient chart. This patient does meet criteria for [facility] service at this time. Will assume care on 07/14/22 at 6AM.

## 2022-07-14 NOTE — PLAN OF CARE
Problem: Adult Inpatient Plan of Care  Goal: Plan of Care Review  Outcome: Ongoing, Progressing   Patient is alert, oriented X4. Care plan explained to patient, she verbalized understanding.     On room air, O2 sat maintain 97%, no respiratory distress noted. On cardiac monitor, running paced rhythm.    Deny nausea/vomiting/diarrhea. Pt reported that today she felt oozing, and blurred vision. Checked pt's vital signs, blood pressure 172/74. Scheduled hydralazine given. Mucinex and tessalon kellen given for cough. Shoulder pain managed with PRN tylenol. Due medications given.     Maintain contact precaution, maintain fall risk precaution, bed in lowest position, bed alarm on. Call light/personal items in reach. Instructed patient call for help as needed. Will continue to monitor.

## 2022-07-14 NOTE — NURSING
Patient's daughter had some concerns about her discharging home today. Physician contacted, given daughters number to call her to discuss discharge. Physician canceled discharge, and gave orders to d/c lauren and start voiding trials. Nurse went to room to d/c lauren, patients daughter would like for us to wait until morning to d/c lauren then. Physician made aware. Will pass along to oncoming shift to d/c lauren in the morning.

## 2022-07-14 NOTE — ASSESSMENT & PLAN NOTE
Patient with hx urinary retention s/p pessary. Was discharged home with Lauren on previous admission lauren was removed in urology clinic and patient was able to void on her own  Attempt a voiding trial

## 2022-07-14 NOTE — PLAN OF CARE
[facility] - Telemetry  Discharge Final Note    Primary Care Provider: Dayton Michael MD    Expected Discharge Date: 7/14/2022    Pharmacist will go over home medications and reasons for medications. VN and bedside nurse to reiterate final discharge instructions.     Cleared from CM . Bedside Nurse and VN notified.    DC round was done via telephone with pt's daughter Betsy. She has some questions for attending team prior to DC. Contact info given to attending provider.      Final Discharge Note (most recent)     Final Note - 07/14/22 1513        Final Note    Assessment Type Final Discharge Note     Anticipated Discharge Disposition [place] [place] [facility]     Hospital Resources/Appts/Education Provided Appointments scheduled and added to AVS;Post-Acute resouces added to AVS        Post-Acute Status    Post-Acute Authorization Home Health     Home Health Status Pending Clinical Review   Orders sent to [facility] for review for [place].    Other Status No Post-Acute Service Needs     Discharge Delays Personal Transportation   Daughter to pickup upon DC.              Future Appointments   Date Time Provider Department Center   7/20/2022  2:00 PM Leonides Fuentes MD [facility] DERM [facility]   7/21/2022  1:20 PM Dayton Michael MD [facility] MED [place] [facility]   7/26/2022 11:00 AM Catrachita Rothman MD [facility] IM [address] [facility]W   8/6/2022 10:00 AM HOME MONITOR DEVICE CHECK, [facility] ARRHPRO [facility]   8/23/2022  9:45 AM Mansi Borrero MD [facility]   8/25/2022 11:30 AM Diaz Roche MD [facility] DERMSUR [facility]   8/26/2022 10:50 AM CARRIE Arechiga MD [facility] OPHTHAL [address]   9/1/2022  9:00 AM Eric Rivera PA-C [facility]OGYN [facility]   9/6/2022  1:30 PM Seng Salgado MD [facility] CARDIO [place]   9/26/2022  8:15 AM LAB, JANES [facility] LAB [place]   10/3/2022  9:00 AM Elidia Madison NP [facility] [place]   10/7/2022  9:00 AM Dayton Michael MD [facility] Clini      Follow-up Information     Dayton Michael MD. Go on  "7/21/2022.    Specialty: Internal Medicine  Why: at 120 pm; FOLLOW UP WITH PCP  Contact information:  [address]  [address]  [place] [address] [phone]             [facility]. Schedule an appointment as soon as possible for a visit.    Why: As needed, HOME HEALTH, OFFICE TO CALL PATIENT/FAMILY TO SET UP APPT TIME  Contact information:  [address] [place] [id number] [phone]                       BP (!) 146/70 (BP Location: Left arm, Patient Position: Sitting)   Pulse 72   Temp 98 °F (36.7 °C) (Oral)   Resp 16   Ht 5' 3" (1.6 m)   Wt 59.5 kg (131 lb 2.8 oz)   LMP  (LMP Unknown)   SpO2 (!) 94%   Breastfeeding No   BMI 23.24 kg/m²        Medication List        CHANGE how you take these medications      furosemide 40 MG tablet  Commonly known as: LASIX  Take 1 tablet (40 mg total) by mouth once daily.  What changed:   · when to take this  · reasons to take this     LANTUS SOLOSTAR U-100 INSULIN glargine 100 units/mL SubQ pen  Generic drug: insulin  Inject 22 Units into the skin once daily.  What changed: when to take this     magnesium oxide 400 mg (241.3 mg magnesium) tablet  Commonly known as: MAG-OX  Take 1 tablet (400 mg total) by mouth once daily.  What changed: when to take this     montelukast 10 mg tablet  Commonly known as: SINGULAIR  Take 1 tablet (10 mg total) by mouth every evening.  What changed: when to take this            CONTINUE taking these medications      acetaminophen 500 MG tablet  Commonly known as: TYLENOL     albuterol 90 mcg/actuation inhaler  Commonly known as: PROVENTIL/VENTOLIN HFA  INHALE 2 PUFFS INTO THE LUNGS EVERY 6 (SIX) HOURS AS NEEDED FOR WHEEZING. RESCUE     albuterol-ipratropium 2.5 mg-0.5 mg/3 mL nebulizer solution  Commonly known as: DUO-NEB  TAKE 3 MLS BY NEBULIZATION EVERY 4 (FOUR) HOURS AS NEEDED FOR WHEEZING.     aspirin 81 MG Chew     benzonatate 100 MG capsule  Commonly known as: TESSALON  Take 1 capsule (100 mg " "total) by mouth 3 (three) times daily as needed for Cough.     blood sugar diagnostic Strp  Commonly known as: ACCU-CHEK HECTOR  Uses Accu-Check Hector meter to test BG 4x/day     carvediloL 25 MG tablet  Commonly known as: COREG  TAKE 2 TABLETS (50 MG TOTAL) BY MOUTH 2 (TWO) TIMES DAILY.     cholecalciferol (vitamin D3) 50 mcg (2,000 unit) Tab  Commonly known as: VITAMIN D3     estradioL 0.01 % (0.1 mg/gram) vaginal cream  Commonly known as: ESTRACE  Place 1 g vaginally every Mon, Wed, Fri.     fluticasone propionate 50 mcg/actuation nasal spray  Commonly known as: FLONASE  2 sprays (100 mcg total) by Each Nostril route once daily.     fluticasone-salmeterol 250-50 mcg/dose 250-50 mcg/dose diskus inhaler  Commonly known as: ADVAIR     hydrALAZINE 100 MG tablet  Commonly known as: APRESOLINE  Take 1 tablet (100 mg total) by mouth 2 (two) times daily.     lancets Misc  Commonly known as: ACCU-CHEK SOFTCLIX LANCETS  Uses Accu-Chek Hector meter to test BG 1x/day     nitroGLYCERIN 0.4 MG SL tablet  Commonly known as: NITROSTAT     omega-3 fatty acids 500 mg Cap     pen needle, diabetic 31 gauge x 3/16" Ndle  Commonly known as: BD ULTRA-FINE MINI PEN NEEDLE  USE WITH LANTUS AT BEDTIME     polyethylene glycol 17 gram Pwpk  Commonly known as: GLYCOLAX  Take 17 g by mouth daily as needed (constipation).     pravastatin 40 MG tablet  Commonly known as: PRAVACHOL  TAKE 1 TABLET BY MOUTH EVERY DAY     pulse oximeter device  Commonly known as: pulse oximeter  by Apply Externally route 2 (two) times a day. Use twice daily at 8 AM and 3 PM and record the value in [facility] as directed.     sodium bicarbonate 650 MG tablet  Take 2 tablets (1,300 mg total) by mouth 3 (three) times daily.     TRADJENTA 5 mg Tab tablet  Generic drug: linaGLIPtin  Take 1 tablet (5 mg total) by mouth once daily.     valsartan 80 MG tablet  Commonly known as: DIOVAN  Take 1 tablet (80 mg total) by mouth 2 (two) times daily.     VITAMIN B-12 100 MCG " tablet  Generic drug: cyanocobalamin               Where to Get Your Medications        Information about where to get these medications is not yet available    Ask your nurse or doctor about these medications  · furosemide 40 MG tablet

## 2022-07-14 NOTE — PLAN OF CARE
Problem: Physical Therapy  Goal: Physical Therapy Goal  Description: Goals to be met by: 2022     Patient will increase functional independence with mobility by performin. Supine to sit with Modified [place]  2. Sit to supine with Modified [place]  3. Sit to stand transfer with Supervision using Rolling Walker  4. Bed to chair transfer with Supervision using [place]  5. Gait  x 100 feet with Supervision using Rolling Walker.   6. Lower extremity exercise program x10 reps with independence    Outcome: Ongoing, Progressing

## 2022-07-14 NOTE — ASSESSMENT & PLAN NOTE
-patient, sputum culture on presentation grew out pansensitive Pseudomonas.  She underwent 7 days of IV cefepime treatment.  She completely treatment course .  - CT chest 7/10 with large L-sided pleural effusion with significant left lower lobe atelectasis - g stain showed no organisms and rare wbc's.  Culture did not grow out any organism.  - patient was weaned down to room air and did not require oxygen after discharge.  She remained afebrile and hemodynamically stable.

## 2022-07-14 NOTE — PROGRESS NOTES
[facility] Medicine  Telemedicine Progress Note    Patient Name: Myesha Quinones  MRN: [id number]  Patient Class: IP- Inpatient   Admission Date: 7/8/2022  Length of Stay: 6 days  Attending Physician: Anamika Galeas MD  Primary Care Provider: Dayton Michael MD          Subjective:     Principal Problem:Acute hypoxemic respiratory failure        HPI:  82F recently DC after being hospitalized for acute hypoxic respiratory failure due to COVID 19 PNA. Was brought to the ER with c/o several days of increasing weakness and lethargy. In the ER she was hypoxic on RA in the 80s which improved to 92-93 on NC. She was in no respiratory distress. Her CXR showed B/L infiltrates. She was given BSA Abx in the ER and  consulted for admission. SUNG ER MD.       Overview/Hospital Course:  Patient presented to the hospital for increasing weakness and lethargy s/p being discharged 8 days ago for COVID-19 pneumonia. Patient was admitted to the hospital medicine with acute on chronic hypoxic respiratory failure due to HAP. She was placed on broad spectrum antibiotics with Cefepime, Vancomycin, and Azithromycin, and IV steroids. Steroids was later discontinued due to clinically improvement. Pulmonology was consulted due to recurrent pneumonia and worsening CXR.CT chest revealed large left effusion with significant left lower lobe atelectasis . Pulmonology performed US/thoracentesis at the bed side on 7/11 with 700 ml drainage and sample sent to lab for micro and cytology as well as fluid analysis.  Pleural fluid culture is saying that is still in process, however rare wbc's and no organisms seen on Gram stain.  Patient's sputum culture from admission did grow out Pseudomonas which was pansensitive.  Patient underwent 7 days of IV cefepime treatment.  Patient was weaned down to room air, she underwent home O2 qualification testing and did not require home oxygen.  He was discharged with home health, with no  further antibiotics.  Her Lasix was restarted at daily dosing.  Patient is to follow-up with PCP for further management of diuretics, repeat blood work, and was management.   Overall it appears the patient has ongoing diffuse weakness and fatigue are likely related to recent COVID infection, concern for lung hold COVID.  She has also had prolonged to back-to-back hospitalizations which may be contributing to her overall fatigue and weakness.      Interval History:   Patient was weaned down to room air today, she underwent home oxygen testing and did not qualify for it.  She remained on room air draw the day.  Worked with Physical therapy who recommended home health discharge.  She finished her 7th day of IV cefepime.  Pleural fluid cultures are still negative growth to date.  Spoke with patient's daughter, who stated that patient is not ready for discharge given persistent fatigue.  Patient also lives alone , and family would need additional time to prepare for discharge and to arrange for additional help for the patient at home.  Discharge order was taken out.  Will attempt a voiding trial to ensure patient can urinate prior to discharge.  Potential tentative discharge home with home health tomorrow.      Review of Systems   Constitutional:  Positive for activity change, appetite change and fatigue. Negative for fever.   HENT:  Negative for congestion.    Eyes:  Negative for visual disturbance.   Respiratory:  Negative for cough, shortness of breath and wheezing.    Cardiovascular:  Negative for chest pain, palpitations and leg swelling.   Gastrointestinal:  Negative for abdominal distention, abdominal pain, constipation, diarrhea, nausea and vomiting.   Genitourinary:  Negative for decreased urine volume and difficulty urinating.   Musculoskeletal:  Negative for arthralgias and myalgias.   Neurological:  Positive for weakness. Negative for dizziness and syncope.   Psychiatric/Behavioral:  Negative for agitation  and confusion.    Objective:     Vital Signs (Most Recent):  Temp: 98.2 °F (36.8 °C) (07/14/22 1542)  Pulse: 75 (07/14/22 1542)  Resp: 18 (07/14/22 1542)  BP: (!) 167/65 (07/14/22 1542)  SpO2: (!) 94 % (07/14/22 1542)   Vital Signs (24h Range):  Temp:  [96.7 °F (35.9 °C)-98.2 °F (36.8 °C)] 98.2 °F (36.8 °C)  Pulse:  [72-80] 75  Resp:  [16-20] 18  SpO2:  [92 %-98 %] 94 %  BP: (126-172)/(58-74) 167/65     Weight: 59.5 kg (131 lb 2.8 oz)  Body mass index is 23.24 kg/m².    Intake/Output Summary (Last 24 hours) at 7/14/2022 1756  Last data filed at 7/14/2022 0600  Gross per 24 hour   Intake 150 ml   Output 950 ml   Net -800 ml      Physical Exam  Constitutional:       General: She is not in acute distress.     Appearance: Normal appearance. She is well-developed.      Comments: Appears weakn   HENT:      Head: Normocephalic and atraumatic.   Eyes:      Pupils: Pupils are equal, round, and reactive to light.   Pulmonary:      Effort: Pulmonary effort is normal. No respiratory distress.   Abdominal:      General: Abdomen is flat. There is no distension.      Palpations: Abdomen is soft.   Musculoskeletal:         General: Normal range of motion.      Cervical back: Neck supple.   Skin:     General: Skin is warm and dry.   Neurological:      General: No focal deficit present.      Mental Status: She is alert and oriented to person, place, and time.   Psychiatric:         Mood and Affect: Mood normal.         Behavior: Behavior normal.       Significant Labs: All pertinent labs within the past 24 hours have been reviewed.  CBC: No results for input(s): WBC, HGB, HCT, PLT in the last 48 hours.  CMP: No results for input(s): NA, K, CL, CO2, GLU, BUN, CREATININE, CALCIUM, PROT, ALBUMIN, BILITOT, ALKPHOS, AST, ALT, ANIONGAP, EGFRNONAA in the last 48 hours.    Invalid input(s): ESTGFAFRICA    Significant Imaging: I have reviewed all pertinent imaging results/findings within the past 24 hours.      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  - Pulmonology consulted - planned US/thoracentesis and sent cultures 7/11  - Improving - comfortable on RA with resting.  Completed IV cefepime treatment course for Pseudomonas pneumonia.  Did not need home oxygen after home oxygen testing was completed.  Patient set up with home health on discharge.      Pleural effusion   CT chest 7/10 with large L-sided pleural effusion with significant left lower lobe atelectasis  Pulmonology consulted.s/p US/thoracentesis 7/11      CKD (chronic kidney disease) stage 4, GFR 15-29 ml/min    Creatinine improved.  In patient's home medications were restarted on discharge        HAP (hospital-acquired pneumonia)  -patient, sputum culture on presentation grew out pansensitive Pseudomonas.  She underwent 7 days of IV cefepime treatment.  She completely treatment course .  - CT chest 7/10 with large L-sided pleural effusion with significant left lower lobe atelectasis - g stain showed no organisms and rare wbc's.  Culture did not grow out any organism.  - patient was weaned down to room air and did not require oxygen after discharge.  She remained afebrile and hemodynamically stable.    ACP (advance care planning)  DNI per Pt and dtr  Ok for chest compressions       Type 2 diabetes mellitus, with long-term current use of insulin  Basal + ISS  She's on steroids and her dtr stated that the last time she was on steroids she glu readings > 800.   Will monitor adjust based on trends  A1C 8.0 (7/8)  - Steroids discontinued  - AG elevated  - Added prandial insulin  - Episodes of hypoglycemia over night 7/10. Insulin therapy hold for now. 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 Amaya; was  placed on Voriconazole therapy but unable to tolerate  - With recent pseudomonas in sputum. Completed treated with Cefepime for 5 days on the previous admission  - Is admitted for pneumonia with worsening CXR  - Appreciate Pulmonary    Hyponatremia  Repeat labs on 7/15        Urinary retention  Patient with hx urinary retention s/p pessary. Was discharged home with Lauren on previous admission lauren was removed in urology clinic and patient was able to void on her own  Attempt a voiding trial      Chronic combined systolic and diastolic heart failure    Patient is identified as having Combined Systolic and Diastolic heart failure that is Chronic. CHF is currently controlled. Latest ECHO performed and demonstrates- Results for orders placed during the hospital encounter of 06/20/22    Echo 6/1/22    Interpretation Summary  · The left ventricle is normal in size with concentric hypertrophy and normal systolic function.  · The estimated ejection fraction is 55%.  · Grade II left ventricular diastolic dysfunction.  · Normal right ventricular size with normal right ventricular systolic function.  · Severe left atrial enlargement.  · Mild mitral regurgitation.  · Mild to moderate tricuspid regurgitation.  · Intermediate central venous pressure (8 mmHg).  · The estimated PA systolic pressure is 56 mmHg.  · There is pulmonary hypertension.  · There is a left pleural effusion.    Repeat labs to check sodium and creatinine, and restart losartan and furosemide accordingly    Hyperlipidemia associated with type 2 diabetes mellitus   Patient is chronically on statin.will continue for now. Monitor clinically. Last LDL was   Lab Results   Component Value Date    LDLCALC 29.8 (L) 04/03/2022          Weakness  PTOTSW  Fall precautions   PT/OT recommends Home with HH      Hypertension associated with diabetes    Chronic, controlled.  Latest blood pressure and vitals reviewed-   Temp:  [96 °F (35.6 °C)-97.2 °F (36.2 °C)]   Pulse:   [65-88]   Resp:  [14-20]   BP: (139-182)/(60-72)   SpO2:  [94 %-99 %] .   Home meds for hypertension were reviewed and noted below. Hospital anti-hypertensive changes were made as shown below.  Hypertension Medications             carvediloL (COREG) 25 MG tablet TAKE 2 TABLETS (50 MG TOTAL) BY MOUTH 2 (TWO) TIMES DAILY.    furosemide (LASIX) 40 MG tablet Take 1 tablet (40 mg total) by mouth every 8 (eight) hours as needed (swelling).    hydrALAZINE (APRESOLINE) 100 MG tablet Take 1 tablet (100 mg total) by mouth 2 (two) times daily.    nitroGLYCERIN (NITROSTAT) 0.4 MG SL tablet Place 0.4 mg under the tongue every 5 (five) minutes as needed for Chest pain.     valsartan (DIOVAN) 80 MG tablet Take 1 tablet (80 mg total) by mouth 2 (two) times daily.        Will utilize p.r.n. blood pressure medication only if patient's blood pressure greater than  180/110 and she develops symptoms such as worsening chest pain or shortness of breath.    -Continues to Hydralazine, Carvedilol  - Hold valsartan due to elevated Cr. Will adjust as needed    Biventricular ICD (implantable cardioverter-defibrillator) in place  Noted. Chronic. No reported issues.        VTE Risk Mitigation (From admission, onward)         Ordered     heparin (porcine) injection 5,000 Units  Every 8 hours         07/11/22 1544     IP VTE HIGH RISK PATIENT  Once         07/08/22 1949     Place sequential compression device  Until discontinued         07/08/22 1949                      I have assessed these finding virtually using telemed platform and with assistance of bedside nurse           Anticipate d/c with home health tomorrow      The attending portion of this evaluation, treatment, and documentation was performed per Anamika Galeas MD via Telemedicine AudioVisual using the secure [employer] software platform with 2 way audio/video. The provider was located off-site and the patient is located in the hospital. The aforementioned video software was utilized  to document the relevant history and physical exam    Anamika Galeas MD  Department of Hospital Medicine   [facility]

## 2022-07-15 ENCOUNTER — PATIENT OUTREACH (OUTPATIENT)
Dept: ADMINISTRATIVE | Facility: OTHER | Age: 82
End: 2022-07-15
Payer: MEDICARE

## 2022-07-15 VITALS
HEART RATE: 72 BPM | OXYGEN SATURATION: 97 % | DIASTOLIC BLOOD PRESSURE: 65 MMHG | BODY MASS INDEX: 23.25 KG/M2 | TEMPERATURE: 99 F | RESPIRATION RATE: 18 BRPM | SYSTOLIC BLOOD PRESSURE: 152 MMHG | WEIGHT: 131.19 LBS | HEIGHT: 63 IN

## 2022-07-15 LAB
ALBUMIN SERPL BCP-MCNC: 2.4 G/DL (ref 3.5–5.2)
ALP SERPL-CCNC: 91 U/L (ref 55–135)
ALT SERPL W/O P-5'-P-CCNC: 16 U/L (ref 10–44)
ANION GAP SERPL CALC-SCNC: 10 MMOL/L (ref 8–16)
AST SERPL-CCNC: 15 U/L (ref 10–40)
BACTERIA SPEC AEROBE CULT: NO GROWTH
BASOPHILS # BLD AUTO: 0.03 K/UL (ref 0–0.2)
BASOPHILS NFR BLD: 0.3 % (ref 0–1.9)
BILIRUB SERPL-MCNC: 0.3 MG/DL (ref 0.1–1)
BUN SERPL-MCNC: 85 MG/DL (ref 8–23)
CALCIUM SERPL-MCNC: 8.7 MG/DL (ref 8.7–10.5)
CHLORIDE SERPL-SCNC: 99 MMOL/L (ref 95–110)
CO2 SERPL-SCNC: 23 MMOL/L (ref 23–29)
CREAT SERPL-MCNC: 1.9 MG/DL (ref 0.5–1.4)
DIFFERENTIAL METHOD: ABNORMAL
EOSINOPHIL # BLD AUTO: 0.6 K/UL (ref 0–0.5)
EOSINOPHIL NFR BLD: 5.9 % (ref 0–8)
ERYTHROCYTE [DISTWIDTH] IN BLOOD BY AUTOMATED COUNT: 15.4 % (ref 11.5–14.5)
EST. GFR  (AFRICAN AMERICAN): 28 ML/MIN/1.73 M^2
EST. GFR  (NON AFRICAN AMERICAN): 24 ML/MIN/1.73 M^2
GLUCOSE SERPL-MCNC: 125 MG/DL (ref 70–110)
HCT VFR BLD AUTO: 25.8 % (ref 37–48.5)
HGB BLD-MCNC: 8.3 G/DL (ref 12–16)
IMM GRANULOCYTES # BLD AUTO: 0.25 K/UL (ref 0–0.04)
IMM GRANULOCYTES NFR BLD AUTO: 2.3 % (ref 0–0.5)
LYMPHOCYTES # BLD AUTO: 1.7 K/UL (ref 1–4.8)
LYMPHOCYTES NFR BLD: 16.1 % (ref 18–48)
MAGNESIUM SERPL-MCNC: 3.2 MG/DL (ref 1.6–2.6)
MCH RBC QN AUTO: 28.7 PG (ref 27–31)
MCHC RBC AUTO-ENTMCNC: 32.2 G/DL (ref 32–36)
MCV RBC AUTO: 89 FL (ref 82–98)
MONOCYTES # BLD AUTO: 0.9 K/UL (ref 0.3–1)
MONOCYTES NFR BLD: 8.5 % (ref 4–15)
NEUTROPHILS # BLD AUTO: 7.2 K/UL (ref 1.8–7.7)
NEUTROPHILS NFR BLD: 66.9 % (ref 38–73)
NRBC BLD-RTO: 0 /100 WBC
PHOSPHATE SERPL-MCNC: 4.5 MG/DL (ref 2.7–4.5)
PLATELET # BLD AUTO: 294 K/UL (ref 150–450)
PMV BLD AUTO: 10.3 FL (ref 9.2–12.9)
POCT GLUCOSE: 129 MG/DL (ref 70–110)
POCT GLUCOSE: 221 MG/DL (ref 70–110)
POTASSIUM SERPL-SCNC: 4.4 MMOL/L (ref 3.5–5.1)
PROT SERPL-MCNC: 6.4 G/DL (ref 6–8.4)
RBC # BLD AUTO: 2.89 M/UL (ref 4–5.4)
SODIUM SERPL-SCNC: 132 MMOL/L (ref 136–145)
WBC # BLD AUTO: 10.71 K/UL (ref 3.9–12.7)

## 2022-07-15 PROCEDURE — 94640 AIRWAY INHALATION TREATMENT: CPT

## 2022-07-15 PROCEDURE — 94761 N-INVAS EAR/PLS OXIMETRY MLT: CPT

## 2022-07-15 PROCEDURE — 25000003 PHARM REV CODE 250: Performed by: HOSPITALIST

## 2022-07-15 PROCEDURE — 85025 COMPLETE CBC W/AUTO DIFF WBC: CPT | Performed by: HOSPITALIST

## 2022-07-15 PROCEDURE — 63600175 PHARM REV CODE 636 W HCPCS: Performed by: NURSE PRACTITIONER

## 2022-07-15 PROCEDURE — 99900035 HC TECH TIME PER 15 MIN (STAT)

## 2022-07-15 PROCEDURE — 36415 COLL VENOUS BLD VENIPUNCTURE: CPT | Performed by: HOSPITALIST

## 2022-07-15 PROCEDURE — 25000003 PHARM REV CODE 250: Performed by: NURSE PRACTITIONER

## 2022-07-15 PROCEDURE — 84100 ASSAY OF PHOSPHORUS: CPT | Performed by: HOSPITALIST

## 2022-07-15 PROCEDURE — 80053 COMPREHEN METABOLIC PANEL: CPT | Performed by: HOSPITALIST

## 2022-07-15 PROCEDURE — 25000242 PHARM REV CODE 250 ALT 637 W/ HCPCS: Performed by: INTERNAL MEDICINE

## 2022-07-15 PROCEDURE — 63600175 PHARM REV CODE 636 W HCPCS: Performed by: STUDENT IN AN ORGANIZED HEALTH CARE EDUCATION/TRAINING PROGRAM

## 2022-07-15 PROCEDURE — 25000003 PHARM REV CODE 250: Performed by: INTERNAL MEDICINE

## 2022-07-15 PROCEDURE — 63600175 PHARM REV CODE 636 W HCPCS: Performed by: HOSPITALIST

## 2022-07-15 PROCEDURE — 83735 ASSAY OF MAGNESIUM: CPT | Performed by: HOSPITALIST

## 2022-07-15 RX ORDER — VALSARTAN 40 MG/1
80 TABLET ORAL 2 TIMES DAILY
Status: DISCONTINUED | OUTPATIENT
Start: 2022-07-15 | End: 2022-07-15 | Stop reason: HOSPADM

## 2022-07-15 RX ORDER — FUROSEMIDE 10 MG/ML
80 INJECTION INTRAMUSCULAR; INTRAVENOUS ONCE
Status: COMPLETED | OUTPATIENT
Start: 2022-07-15 | End: 2022-07-15

## 2022-07-15 RX ADMIN — LACTOBACILLUS ACIDOPHILUS / LACTOBACILLUS BULGARICUS 1 EACH: 100 MILLION CFU STRENGTH GRANULES at 09:07

## 2022-07-15 RX ADMIN — PRAVASTATIN SODIUM 40 MG: 40 TABLET ORAL at 09:07

## 2022-07-15 RX ADMIN — IPRATROPIUM BROMIDE AND ALBUTEROL SULFATE 3 ML: .5; 3 SOLUTION RESPIRATORY (INHALATION) at 07:07

## 2022-07-15 RX ADMIN — HEPARIN SODIUM 5000 UNITS: 5000 INJECTION INTRAVENOUS; SUBCUTANEOUS at 05:07

## 2022-07-15 RX ADMIN — FUROSEMIDE 80 MG: 10 INJECTION, SOLUTION INTRAMUSCULAR; INTRAVENOUS at 09:07

## 2022-07-15 RX ADMIN — FLUTICASONE FUROATE AND VILANTEROL TRIFENATATE 1 PUFF: 100; 25 POWDER RESPIRATORY (INHALATION) at 07:07

## 2022-07-15 RX ADMIN — IPRATROPIUM BROMIDE AND ALBUTEROL SULFATE 3 ML: .5; 3 SOLUTION RESPIRATORY (INHALATION) at 01:07

## 2022-07-15 RX ADMIN — HYDRALAZINE HYDROCHLORIDE 100 MG: 25 TABLET, FILM COATED ORAL at 09:07

## 2022-07-15 RX ADMIN — GUAIFENESIN AND DEXTROMETHORPHAN HYDROBROMIDE 1 TABLET: 600; 30 TABLET, EXTENDED RELEASE ORAL at 09:07

## 2022-07-15 RX ADMIN — ASPIRIN 81 MG: 81 TABLET, CHEWABLE ORAL at 09:07

## 2022-07-15 RX ADMIN — INSULIN ASPART 2 UNITS: 100 INJECTION, SOLUTION INTRAVENOUS; SUBCUTANEOUS at 12:07

## 2022-07-15 RX ADMIN — INSULIN ASPART 2 UNITS: 100 INJECTION, SOLUTION INTRAVENOUS; SUBCUTANEOUS at 09:07

## 2022-07-15 RX ADMIN — INSULIN DETEMIR 8 UNITS: 100 INJECTION, SOLUTION SUBCUTANEOUS at 09:07

## 2022-07-15 RX ADMIN — FLUTICASONE PROPIONATE 100 MCG: 50 SPRAY, METERED NASAL at 09:07

## 2022-07-15 RX ADMIN — POLYETHYLENE GLYCOL 3350 17 G: 17 POWDER, FOR SOLUTION ORAL at 09:07

## 2022-07-15 RX ADMIN — VALSARTAN 80 MG: 40 TABLET, FILM COATED ORAL at 09:07

## 2022-07-15 RX ADMIN — CARVEDILOL 6.25 MG: 6.25 TABLET, FILM COATED ORAL at 09:07

## 2022-07-15 NOTE — PLAN OF CARE
Patient on room air with documented SpO2 and in no apparent distress. Will continue to monitor.

## 2022-07-15 NOTE — PLAN OF CARE
[facility] - Telemetry  Discharge Final Note    Primary Care Provider: Dayton Michael MD    Expected Discharge Date: 7/15/2022    Pharmacist will go over home medications and reasons for medications. VN and bedside nurse to reiterate final discharge instructions.     Cleared from CM . Bedside Nurse and VN notified. No other CM needs.    DC round was done via telephone with pt's daughter Betsy. She has some questions for attending team prior to DC. Contact info given to attending provider.      Final Discharge Note (most recent)     Final Note - 07/14/22 1513        Final Note    Assessment Type Final Discharge Note     Anticipated Discharge Disposition Home-Health Care [facility]     Hospital Resources/Appts/Education Provided Appointments scheduled and added to AVS;Post-Acute resouces added to AVS        Post-Acute Status    Post-Acute Authorization Home Health     Home Health Status Pending Clinical Review   Orders sent to [place] for review for [place].    Other Status No Post-Acute Service Needs     Discharge Delays Personal Transportation   Daughter to pickup upon DC.              Future Appointments   Date Time Provider Department Center   7/20/2022  2:00 PM Leonides Fuentes MD [facility] DERM [facility]   7/21/2022  1:20 PM Dayton Michael MD [facility] FAM MED [facility]   7/26/2022 11:00 AM Catrachita Rothman MD [facility] IM [facility] PCW   8/6/2022 10:00 AM HOME MONITOR DEVICE CHECK, [facility] ARRHPRO [facility]   8/23/2022  9:45 AM Mansi Borrero MD [facility]   8/25/2022 11:30 AM Diaz Roche MD [facility] DERMSUR [facility]   8/26/2022 10:50 AM CARRIE Arechiga MD [facility] OPHTHAL [facility]   9/1/2022  9:00 AM Eric Rivera PA-C [facility] UROGYN [facility]   9/6/2022  1:30 PM Seng Salgado MD [facility] CARDIO [place]   9/26/2022  8:15 AM LAB, [facility] LAB [place]   10/3/2022  9:00 AM Elidia Madison NP [facility] ENDOC [place]   10/7/2022  9:00 AM Dayton Michael MD [facility] Clini      Follow-up Information     Dayton ALLISON  "MD Fern[facility]. Go on 7/21/2022.    Specialty: Internal Medicine  Why: at 120 pm; FOLLOW UP WITH PCP  Contact information:  [address]  [address]  [place] [address] [phone]             [facility]. Schedule an appointment as soon as possible for a visit.    Why: As needed, HOME HEALTH, OFFICE TO CALL PATIENT/FAMILY TO SET UP APPT TIME  Contact information:  [address] [place] [id number] [phone]                       BP (!) 174/76 (BP Location: Left arm, Patient Position: Lying)   Pulse 80   Temp 97.3 °F (36.3 °C) (Oral)   Resp 18   Ht 5' 3" (1.6 m)   Wt 59.5 kg (131 lb 2.8 oz)   LMP  (LMP Unknown)   SpO2 (!) 93%   Breastfeeding No   BMI 23.24 kg/m²        Medication List      CHANGE how you take these medications    furosemide 40 MG tablet  Commonly known as: LASIX  Take 1 tablet (40 mg total) by mouth once daily.  What changed:   · when to take this  · reasons to take this     LANTUS SOLOSTAR U-100 INSULIN glargine 100 units/mL SubQ pen  Generic drug: insulin  Inject 22 Units into the skin once daily.  What changed: when to take this     magnesium oxide 400 mg (241.3 mg magnesium) tablet  Commonly known as: MAG-OX  Take 1 tablet (400 mg total) by mouth once daily.  What changed: when to take this     montelukast 10 mg tablet  Commonly known as: SINGULAIR  Take 1 tablet (10 mg total) by mouth every evening.  What changed: when to take this        CONTINUE taking these medications    acetaminophen 500 MG tablet  Commonly known as: TYLENOL     albuterol 90 mcg/actuation inhaler  Commonly known as: PROVENTIL/VENTOLIN HFA  INHALE 2 PUFFS INTO THE LUNGS EVERY 6 (SIX) HOURS AS NEEDED FOR WHEEZING. RESCUE     albuterol-ipratropium 2.5 mg-0.5 mg/3 mL nebulizer solution  Commonly known as: DUO-NEB  TAKE 3 MLS BY NEBULIZATION EVERY 4 (FOUR) HOURS AS NEEDED FOR WHEEZING.     aspirin 81 MG Chew     benzonatate 100 MG capsule  Commonly known as: TESSALON  Take 1 capsule (100 " "mg total) by mouth 3 (three) times daily as needed for Cough.     blood sugar diagnostic Strp  Commonly known as: ACCU-CHEK HECTOR  Uses Accu-Check Hector meter to test BG 4x/day     carvediloL 25 MG tablet  Commonly known as: COREG  TAKE 2 TABLETS (50 MG TOTAL) BY MOUTH 2 (TWO) TIMES DAILY.     cholecalciferol (vitamin D3) 50 mcg (2,000 unit) Tab  Commonly known as: VITAMIN D3     estradioL 0.01 % (0.1 mg/gram) vaginal cream  Commonly known as: ESTRACE  Place 1 g vaginally every Mon, Wed, Fri.     fluticasone propionate 50 mcg/actuation nasal spray  Commonly known as: FLONASE  2 sprays (100 mcg total) by Each Nostril route once daily.     fluticasone-salmeterol 250-50 mcg/dose 250-50 mcg/dose diskus inhaler  Commonly known as: ADVAIR     hydrALAZINE 100 MG tablet  Commonly known as: APRESOLINE  Take 1 tablet (100 mg total) by mouth 2 (two) times daily.     lancets Misc  Commonly known as: ACCU-CHEK SOFTCLIX LANCETS  Uses Accu-Chek Hector meter to test BG 1x/day     nitroGLYCERIN 0.4 MG SL tablet  Commonly known as: NITROSTAT     omega-3 fatty acids 500 mg Cap     pen needle, diabetic 31 gauge x 3/16" Ndle  Commonly known as: BD ULTRA-FINE MINI PEN NEEDLE  USE WITH LANTUS AT BEDTIME     polyethylene glycol 17 gram Pwpk  Commonly known as: GLYCOLAX  Take 17 g by mouth daily as needed (constipation).     pravastatin 40 MG tablet  Commonly known as: PRAVACHOL  TAKE 1 TABLET BY MOUTH EVERY DAY     pulse oximeter device  Commonly known as: pulse oximeter  by Apply Externally route 2 (two) times a day. Use twice daily at 8 AM and 3 PM and record the value in [facility] as directed.     sodium bicarbonate 650 MG tablet  Take 2 tablets (1,300 mg total) by mouth 3 (three) times daily.     TRADJENTA 5 mg Tab tablet  Generic drug: linaGLIPtin  Take 1 tablet (5 mg total) by mouth once daily.     valsartan 80 MG tablet  Commonly known as: DIOVAN  Take 1 tablet (80 mg total) by mouth 2 (two) times daily.     VITAMIN B-12 100 MCG " tablet  Generic drug: cyanocobalamin           Where to Get Your Medications      Information about where to get these medications is not yet available    Ask your nurse or doctor about these medications  · furosemide 40 MG tablet           Contact Info     Dayton Michael MD   Specialty: Internal Medicine   Relationship: PCP - General    [address]  [address]  [place] [address]   Phone: [phone]       Next Steps: Go on 7/21/2022    Instructions: at 120 pm; FOLLOW UP WITH PCP    [facility] [address] [address]   Phone: [phone]       Next Steps: Schedule an appointment as soon as possible for a visit    Instructions: As needed, HOME HEALTH, OFFICE TO CALL PATIENT/FAMILY TO SET UP APPT TIME

## 2022-07-15 NOTE — PLAN OF CARE
"   07/15/22 1041   Post-Acute Status   Post-Acute Authorization Home Health   Home Health Status Set-up Complete/Auth obtained  ([place] with [place]. Current [place] date is for Monday. [place] reached out to admission team to see if pt can be seen sooner than Monday. Awaiting return call for updated start date.)     1229 pm - Updated with Sara. SOC for Saturday with [facility]. Daughter updated.    Future Appointments   Date Time Provider Department Center   7/20/2022  2:00 PM Leonides Fuentes MD [facility] DERM [facility]   7/21/2022  1:20 PM Dayton Michael MD [facility]   7/26/2022 11:00 AM Catrachita Rothman MD [facility] IM [facility] PC[facility]   8/6/2022 10:00 AM HOME MONITOR DEVICE CHECK, [facility] ARRHPRO [facility]   8/23/2022  9:45 AM Mansi Borrero MD [facility]   8/25/2022 11:30 AM Diaz Roche MD [facility] DERMSUR [facility]   8/26/2022 10:50 AM CARRIE Arechiga MD [facility] OPHTHAL [facility]   9/1/2022  9:00 AM Eric Rivera PA-C [facility]OGYN [facility]   9/6/2022  1:30 PM Seng Salgado MD [facility] CARDIO [place]   9/26/2022  8:15 AM LAB, [place] [facility] LAB [place]   10/3/2022  9:00 AM Elidia Madison NP [facility]   10/7/2022  9:00 AM Dayton Michael MD [facility] MED [place] [facility]     BP (!) 174/76   Pulse 80   Temp 97.3 °F (36.3 °C) (Oral)   Resp 18   Ht 5' 3" (1.6 m)   Wt 59.5 kg (131 lb 2.8 oz)   LMP  (LMP Unknown)   SpO2 (!) 93%   Breastfeeding No   BMI 23.24 kg/m²      albuterol-ipratropium  3 mL Nebulization Q6H WAKE    aspirin  81 mg Oral Daily    carvediloL  6.25 mg Oral BID WM    dextromethorphan-guaiFENesin  mg  1 tablet Oral BID    fluticasone furoate-vilanteroL  1 puff Inhalation Daily    fluticasone propionate  2 spray Each Nostril Daily    heparin (porcine)  5,000 Units Subcutaneous Q8H    hydrALAZINE  100 mg Oral BID    insulin aspart U-100  2 Units Subcutaneous TIDWM    insulin detemir U-100  8 Units Subcutaneous BID    lactobacillus acidophilus & bulgar  1 " packet Oral BID    montelukast  10 mg Oral QHS    polyethylene glycol  17 g Oral BID    pravastatin  40 mg Oral Daily    valsartan  80 mg Oral BID

## 2022-07-15 NOTE — PROGRESS NOTES
07/15/22 1317   AVS Confirmation   Discharge instructions and AVS given to and reviewed with patient and/or significant other. Yes   AVS printed and handed to patient by bedside nurse. VN reviewed discharge instructions with patient and son Ray using teachback method.  Allowed time for questions, all questions answered.  Patient and son verbalized complete understanding of discharge instructions and voices no concerns.    Discharge instructions complete. Transport wheelchair requested.  Bedside nurse notified.

## 2022-07-15 NOTE — NURSING
"Spoke with Ms. Meyer regarding the patients lauren, she said "she was worried about the patients safety during the night." Removed lauren per order.   "

## 2022-07-15 NOTE — PROGRESS NOTES
IP Liaison - Final Visit Note    Patient: Myesha Quinones  MRN:  [id number]  Date of Service:  7/15/2022  Completed by:  ORESTES Pederson    Reason for Visit   Patient presents with    IP Liaison Chart Review        Patient Summary     Discharge Date: 07/15/2022  Discharge telephone number/address: [phone] / [address] [place] [address]  Follow up provider: Dayton Michael MD  Follow up appointments: 7/21/2022 @ 1:20pm  Home Health agency & telephone number: [facility]  DME ordered &  name: n/a  Assigned OPCM RN/SW: n/a  Report sent to follow up team (PCP/OPCM) via in basket message: n/a  Community Resources arranged including agency name & contact info: n/a      ORESTES Pederson

## 2022-07-15 NOTE — PLAN OF CARE
Problem: Adult Inpatient Plan of Care  Goal: Plan of Care Review  Patient is awake and orientedx4. Care plan explained to patient; she verbalized understanding. On room air, O2 saturation at 95-96%. Hooked to heart monitor running paced at 82-89bpm. De La Cruz catheter in place draining yellow urine. No pain/n/v/d during shift. Due medications given. Accuchecks completed, covered per sliding scale. Encouraged to turn every 2 hours as tolerated. Maintained on contact isolation/ fall risk precaution. Bed in lowest position, bed alarm on, call light/personal items within reach and instructed to call for help when needed. Will continue to monitor.    Outcome: Ongoing, Progressing

## 2022-07-15 NOTE — PROGRESS NOTES
[facility]                    Pharmacy       Discharge Medication Education    Patient ACCEPTED medication education. Pharmacy has provided education on the name, indication, and possible side effects of the medication(s) prescribed, using teach-back method.     The following medications have also been discussed, during this admission.        Medication List        CHANGE how you take these medications      furosemide 40 MG tablet  Commonly known as: LASIX  Take 1 tablet (40 mg total) by mouth once daily.  What changed:   when to take this  reasons to take this     LANTUS SOLOSTAR U-100 INSULIN glargine 100 units/mL SubQ pen  Generic drug: insulin  Inject 22 Units into the skin once daily.  What changed: when to take this     magnesium oxide 400 mg (241.3 mg magnesium) tablet  Commonly known as: MAG-OX  Take 1 tablet (400 mg total) by mouth once daily.  What changed: when to take this     montelukast 10 mg tablet  Commonly known as: SINGULAIR  Take 1 tablet (10 mg total) by mouth every evening.  What changed: when to take this            CONTINUE taking these medications      acetaminophen 500 MG tablet  Commonly known as: TYLENOL     albuterol 90 mcg/actuation inhaler  Commonly known as: PROVENTIL/VENTOLIN HFA  INHALE 2 PUFFS INTO THE LUNGS EVERY 6 (SIX) HOURS AS NEEDED FOR WHEEZING. RESCUE     albuterol-ipratropium 2.5 mg-0.5 mg/3 mL nebulizer solution  Commonly known as: DUO-NEB  TAKE 3 MLS BY NEBULIZATION EVERY 4 (FOUR) HOURS AS NEEDED FOR WHEEZING.     aspirin 81 MG Chew     benzonatate 100 MG capsule  Commonly known as: TESSALON  Take 1 capsule (100 mg total) by mouth 3 (three) times daily as needed for Cough.     blood sugar diagnostic Strp  Commonly known as: ACCU-CHEK HECTOR  Uses Accu-Check Hector meter to test BG 4x/day     carvediloL 25 MG tablet  Commonly known as: COREG  TAKE 2 TABLETS (50 MG TOTAL) BY MOUTH 2 (TWO) TIMES DAILY.     cholecalciferol (vitamin D3) 50 mcg (2,000 unit)  "Tab  Commonly known as: VITAMIN D3     estradioL 0.01 % (0.1 mg/gram) vaginal cream  Commonly known as: ESTRACE  Place 1 g vaginally every Mon, Wed, Fri.     fluticasone propionate 50 mcg/actuation nasal spray  Commonly known as: FLONASE  2 sprays (100 mcg total) by Each Nostril route once daily.     fluticasone-salmeterol 250-50 mcg/dose 250-50 mcg/dose diskus inhaler  Commonly known as: ADVAIR     hydrALAZINE 100 MG tablet  Commonly known as: APRESOLINE  Take 1 tablet (100 mg total) by mouth 2 (two) times daily.     lancets Misc  Commonly known as: ACCU-CHEK SOFTCLIX LANCETS  Uses Accu-Chek Angelica meter to test BG 1x/day     nitroGLYCERIN 0.4 MG SL tablet  Commonly known as: NITROSTAT     omega-3 fatty acids 500 mg Cap     pen needle, diabetic 31 gauge x 3/16" Ndle  Commonly known as: BD ULTRA-FINE MINI PEN NEEDLE  USE WITH LANTUS AT BEDTIME     polyethylene glycol 17 gram Pwpk  Commonly known as: GLYCOLAX  Take 17 g by mouth daily as needed (constipation).     pravastatin 40 MG tablet  Commonly known as: PRAVACHOL  TAKE 1 TABLET BY MOUTH EVERY DAY     pulse oximeter device  Commonly known as: pulse oximeter  by Apply Externally route 2 (two) times a day. Use twice daily at 8 AM and 3 PM and [facility].     sodium bicarbonate 650 MG tablet  Take 2 tablets (1,300 mg total) by mouth 3 (three) times daily.     TRADJENTA 5 mg Tab tablet  Generic drug: linaGLIPtin  Take 1 tablet (5 mg total) by mouth once daily.     valsartan 80 MG tablet  Commonly known as: DIOVAN  Take 1 tablet (80 mg total) by mouth 2 (two) times daily.     VITAMIN B-12 100 MCG tablet  Generic drug: cyanocobalamin               Where to Get Your Medications        Information about where to get these medications is not yet available    Ask your nurse or doctor about these medications  furosemide 40 MG tablet          Thank you  Destinee Rg, PharmD  [phone]    "

## 2022-07-16 NOTE — DISCHARGE SUMMARY
[facility] Medicine  Discharge Summary      Patient Name: Myesha Quinones  MRN: [id number]  Patient Class: IP- Inpatient  Admission Date: 7/8/2022  Hospital Length of Stay: 7 days  Discharge Date and Time:  07/15/2022 11:10 PM  Attending Physician: No att. providers found   Discharging Provider: Anamika Galeas MD  Primary Care Provider: Dayton Michael MD      HPI:   82F recently DC after being hospitalized for acute hypoxic respiratory failure due to COVID 19 PNA. Was brought to the ER with c/o several days of increasing weakness and lethargy. In the ER she was hypoxic on RA in the 80s which improved to 92-93 on NC. She was in no respiratory distress. Her CXR showed B/L infiltrates. She was given BSA Abx in the ER and  consulted for admission. SUNG ER MD.       * No surgery found *      Hospital Course:   Patient presented to the hospital for increasing weakness and lethargy s/p being discharged 8 days ago for COVID-19 pneumonia. Patient was admitted to the hospital medicine with acute on chronic hypoxic respiratory failure due to HAP. She was placed on broad spectrum antibiotics with Cefepime, Vancomycin, and Azithromycin, and IV steroids. Steroids was later discontinued due to clinically improvement. Pulmonology was consulted due to recurrent pneumonia and worsening CXR.CT chest revealed large left effusion with significant left lower lobe atelectasis . Pulmonology performed US/thoracentesis at the bed side on 7/11 with 700 ml drainage and sample sent to lab for micro and cytology as well as fluid analysis.  Pleural fluid culture is saying that is still in process, however rare wbc's and no organisms seen on Gram stain.  Patient's sputum culture from admission did grow out Pseudomonas which was pansensitive.  Patient underwent 7 days of IV cefepime treatment.  Patient was weaned down to room air, she underwent home O2 qualification testing and did not require home oxygen.        SHe was  discharged with home health, with no further antibiotics.  Her Lasix was restarted at daily dosing.  Patient is to follow-up with PCP for further management of diuretics, repeat blood work, and was management.   Overall it appears the patient has ongoing diffuse weakness and fatigue are likely related to recent COVID infection, concern for lung hold COVID.  She has also had prolonged to back-to-back hospitalizations which may be contributing to her overall fatigue and weakness. Patient should follow up with endocrine for difficult to control diabetes with hyper and hypo-glycemia episodes        Goals of Care Treatment Preferences:  Code Status: Partial Code      Consults:   Consults (From admission, onward)        Status Ordering Provider     Inpatient virtual consult to Hospital Medicine  Once        Provider:  (Not yet assigned)    Completed DEVANTE NJ     Inpatient consult to Pulmonology  Once        Provider:  (Not yet assigned)    Completed TUYET MATHEW          No new Assessment & Plan notes have been filed under this hospital service since the last note was generated.  Service: Hospital Medicine    Final Active Diagnoses:    Diagnosis Date Noted POA    PRINCIPAL PROBLEM:  Acute hypoxemic respiratory failure [J96.01] 06/21/2022 Yes    Pneumonia of left lower lobe due to Pseudomonas species [J15.1] 07/14/2022 Yes    Pleural effusion [J90]  Yes    ACP (advance care planning) [Z71.89] 07/08/2022 Not Applicable    HAP (hospital-acquired pneumonia) [J18.9, Y95] 07/08/2022 Yes    CKD (chronic kidney disease) stage 4, GFR 15-29 ml/min [N18.4] 07/08/2022 Yes    Type 2 diabetes mellitus, with long-term current use of insulin [E11.9, Z79.4] 06/20/2022 Not Applicable    Hyponatremia [E87.1] 04/02/2022 Yes    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] 04/02/2022 Yes    Urinary retention [R33.9] 02/28/2022 Yes    Chronic combined systolic and diastolic heart failure [I50.42]  03/23/2021 Yes    Hyperlipidemia associated with type 2 diabetes mellitus [E11.69, E78.5] 07/27/2020 Yes    Weakness [R53.1]  Yes    Hypertension associated with diabetes [E11.59, I15.2] 07/20/2015 Yes    Biventricular ICD (implantable cardioverter-defibrillator) in place [Z95.810] 01/09/2014 Yes      Problems Resolved During this Admission:       Discharged Condition: good    Disposition: Home-Health Care [facility]    Follow Up:   Follow-up Information     Dayton Michael MD. Go on 7/21/2022.    Specialty: Internal Medicine  Why: at 120 pm; FOLLOW UP WITH PCP  Contact information:  [address]  [address]  [facility] [address] [phone]             [facility]. Schedule an appointment as soon as possible for a visit.    Why: As needed, HOME HEALTH, OFFICE TO CALL PATIENT/FAMILY TO SET UP APPT TIME  Contact information:  [address] [place] [id number] [phone]                     Patient Instructions:      Diet diabetic       Significant Diagnostic Studies: Labs:   CMP   Recent Labs   Lab 07/15/22  0237   *   K 4.4   CL 99   CO2 23   *   BUN 85*   CREATININE 1.9*   CALCIUM 8.7   PROT 6.4   ALBUMIN 2.4*   BILITOT 0.3   ALKPHOS 91   AST 15   ALT 16   ANIONGAP 10   ESTGFRAFRICA 28*   EGFRNONAA 24*    and CBC   Recent Labs   Lab 07/15/22  0237   WBC 10.71   HGB 8.3*   HCT 25.8*          Pending Diagnostic Studies:     Procedure Component Value Units Date/Time    Cytology, Fluid/Wash/Brush [[id number]] Collected: 07/11/22 1444    Order Status: Sent Lab Status: In process Updated: 07/11/22 1506    Specimen: Body Fluid     Strep Pneumo AG Urine [[id number]] Collected: 07/09/22 2210    Order Status: Sent Lab Status: In process Updated: 07/09/22 2210    Specimen: Urine, Clean Catch          Medications:  Reconciled Home Medications:      Medication List      CHANGE how you take these medications    furosemide 40 MG tablet  Commonly known as: LASIX  Take 1 tablet (40 mg  total) by mouth once daily.  What changed:   · when to take this  · reasons to take this     LANTUS SOLOSTAR U-100 INSULIN glargine 100 units/mL SubQ pen  Generic drug: insulin  Inject 22 Units into the skin once daily.  What changed: when to take this     magnesium oxide 400 mg (241.3 mg magnesium) tablet  Commonly known as: MAG-OX  Take 1 tablet (400 mg total) by mouth once daily.  What changed: when to take this     montelukast 10 mg tablet  Commonly known as: SINGULAIR  Take 1 tablet (10 mg total) by mouth every evening.  What changed: when to take this        CONTINUE taking these medications    acetaminophen 500 MG tablet  Commonly known as: TYLENOL  Take 500 mg by mouth once daily.     albuterol 90 mcg/actuation inhaler  Commonly known as: PROVENTIL/VENTOLIN HFA  INHALE 2 PUFFS INTO THE LUNGS EVERY 6 (SIX) HOURS AS NEEDED FOR WHEEZING. RESCUE     albuterol-ipratropium 2.5 mg-0.5 mg/3 mL nebulizer solution  Commonly known as: DUO-NEB  TAKE 3 MLS BY NEBULIZATION EVERY 4 (FOUR) HOURS AS NEEDED FOR WHEEZING.     aspirin 81 MG Chew  Take 81 mg by mouth once daily.     benzonatate 100 MG capsule  Commonly known as: TESSALON  Take 1 capsule (100 mg total) by mouth 3 (three) times daily as needed for Cough.     blood sugar diagnostic Strp  Commonly known as: ACCU-CHEK HECTOR  Uses Accu-Check Hector meter to test BG 4x/day     carvediloL 25 MG tablet  Commonly known as: COREG  TAKE 2 TABLETS (50 MG TOTAL) BY MOUTH 2 (TWO) TIMES DAILY.     cholecalciferol (vitamin D3) 50 mcg (2,000 unit) Tab  Commonly known as: VITAMIN D3  Take 1 tablet by mouth once daily.     estradioL 0.01 % (0.1 mg/gram) vaginal cream  Commonly known as: ESTRACE  Place 1 g vaginally every Mon, Wed, Fri.     fluticasone propionate 50 mcg/actuation nasal spray  Commonly known as: FLONASE  2 sprays (100 mcg total) by Each Nostril route once daily.     fluticasone-salmeterol 250-50 mcg/dose 250-50 mcg/dose diskus inhaler  Commonly known as:  "ADVAIR  Inhale 1 puff into the lungs as needed. Controller     hydrALAZINE 100 MG tablet  Commonly known as: APRESOLINE  Take 1 tablet (100 mg total) by mouth 2 (two) times daily.     lancets Misc  Commonly known as: ACCU-CHEK SOFTCLIX LANCETS  Uses Accu-Chek Angelica meter to test BG 1x/day     nitroGLYCERIN 0.4 MG SL tablet  Commonly known as: NITROSTAT  Place 0.4 mg under the tongue every 5 (five) minutes as needed for Chest pain.     omega-3 fatty acids 500 mg Cap  Take 1 capsule by mouth Daily.     pen needle, diabetic 31 gauge x 3/16" Ndle  Commonly known as: BD ULTRA-FINE MINI PEN NEEDLE  USE WITH LANTUS AT BEDTIME     polyethylene glycol 17 gram Pwpk  Commonly known as: GLYCOLAX  Take 17 g by mouth daily as needed (constipation).     pravastatin 40 MG tablet  Commonly known as: PRAVACHOL  TAKE 1 TABLET BY MOUTH EVERY DAY     pulse oximeter device  Commonly known as: pulse oximeter  by Apply Externally route 2 (two) times a day. Use twice daily at 8 AM and 3 PM and record the value in MyChart as directed.     sodium bicarbonate 650 MG tablet  Take 2 tablets (1,300 mg total) by mouth 3 (three) times daily.     TRADJENTA 5 mg Tab tablet  Generic drug: linaGLIPtin  Take 1 tablet (5 mg total) by mouth once daily.     valsartan 80 MG tablet  Commonly known as: DIOVAN  Take 1 tablet (80 mg total) by mouth 2 (two) times daily.     VITAMIN B-12 100 MCG tablet  Generic drug: cyanocobalamin  Take 100 mcg by mouth once daily.            Indwelling Lines/Drains at time of discharge:   Lines/Drains/Airways     None                 Time spent on the discharge of patient: 35 minutes         The attending portion of this evaluation, treatment, and documentation was performed per Anamika Galeas MD via Telemedicine AudioVisual using the secure [employer] software platform with 2 way audio/video. The provider was located off-site and the patient is located in the hospital. The aforementioned video software was utilized to document " the relevant history and physical exam    Anamika Galeas MD  Department of Hospital Medicine  [facility]

## 2022-07-18 ENCOUNTER — EXTERNAL HOME HEALTH (OUTPATIENT)
Dept: HOME HEALTH SERVICES | Facility: HOSPITAL | Age: 82
End: 2022-07-18
Payer: MEDICARE

## 2022-07-18 LAB
FINAL PATHOLOGIC DIAGNOSIS: NORMAL
Lab: NORMAL

## 2022-07-19 ENCOUNTER — LAB VISIT (OUTPATIENT)
Dept: LAB | Facility: HOSPITAL | Age: 82
End: 2022-07-19
Attending: FAMILY MEDICINE
Payer: MEDICARE

## 2022-07-19 DIAGNOSIS — I13.0 HYPERTENSIVE HEART AND RENAL DISEASE WITH CONGESTIVE HEART FAILURE: Primary | ICD-10-CM

## 2022-07-19 LAB
ANION GAP SERPL CALC-SCNC: 14 MMOL/L (ref 8–16)
BACTERIA SPEC ANAEROBE CULT: NORMAL
BUN SERPL-MCNC: 87 MG/DL (ref 8–23)
CALCIUM SERPL-MCNC: 9.1 MG/DL (ref 8.7–10.5)
CHLORIDE SERPL-SCNC: 88 MMOL/L (ref 95–110)
CO2 SERPL-SCNC: 27 MMOL/L (ref 23–29)
CREAT SERPL-MCNC: 2.4 MG/DL (ref 0.5–1.4)
EST. GFR  (AFRICAN AMERICAN): 20.9 ML/MIN/1.73 M^2
EST. GFR  (NON AFRICAN AMERICAN): 18.1 ML/MIN/1.73 M^2
GLUCOSE SERPL-MCNC: 155 MG/DL (ref 70–110)
POTASSIUM SERPL-SCNC: 4.8 MMOL/L (ref 3.5–5.1)
SODIUM SERPL-SCNC: 129 MMOL/L (ref 136–145)

## 2022-07-19 PROCEDURE — 80048 BASIC METABOLIC PNL TOTAL CA: CPT | Performed by: FAMILY MEDICINE

## 2022-07-21 ENCOUNTER — OFFICE VISIT (OUTPATIENT)
Dept: FAMILY MEDICINE | Facility: CLINIC | Age: 82
End: 2022-07-21
Attending: FAMILY MEDICINE
Payer: MEDICARE

## 2022-07-21 VITALS
OXYGEN SATURATION: 97 % | DIASTOLIC BLOOD PRESSURE: 40 MMHG | HEART RATE: 54 BPM | SYSTOLIC BLOOD PRESSURE: 110 MMHG | WEIGHT: 124.13 LBS | HEIGHT: 63 IN | BODY MASS INDEX: 21.99 KG/M2

## 2022-07-21 DIAGNOSIS — N18.32 TYPE 2 DIABETES MELLITUS WITH STAGE 3B CHRONIC KIDNEY DISEASE, WITH LONG-TERM CURRENT USE OF INSULIN: ICD-10-CM

## 2022-07-21 DIAGNOSIS — Z09 HOSPITAL DISCHARGE FOLLOW-UP: Primary | ICD-10-CM

## 2022-07-21 DIAGNOSIS — J44.9 CHRONIC OBSTRUCTIVE PULMONARY DISEASE, UNSPECIFIED COPD TYPE: ICD-10-CM

## 2022-07-21 DIAGNOSIS — E11.29 MICROALBUMINURIA DUE TO TYPE 2 DIABETES MELLITUS: ICD-10-CM

## 2022-07-21 DIAGNOSIS — R11.0 NAUSEA: ICD-10-CM

## 2022-07-21 DIAGNOSIS — E11.69 HYPERLIPIDEMIA ASSOCIATED WITH TYPE 2 DIABETES MELLITUS: ICD-10-CM

## 2022-07-21 DIAGNOSIS — I15.2 HYPERTENSION ASSOCIATED WITH DIABETES: ICD-10-CM

## 2022-07-21 DIAGNOSIS — F32.0 CURRENT MILD EPISODE OF MAJOR DEPRESSIVE DISORDER WITHOUT PRIOR EPISODE: ICD-10-CM

## 2022-07-21 DIAGNOSIS — Z79.4 TYPE 2 DIABETES MELLITUS WITH STAGE 3B CHRONIC KIDNEY DISEASE, WITH LONG-TERM CURRENT USE OF INSULIN: ICD-10-CM

## 2022-07-21 DIAGNOSIS — E11.59 HYPERTENSION ASSOCIATED WITH DIABETES: ICD-10-CM

## 2022-07-21 DIAGNOSIS — E11.22 TYPE 2 DIABETES MELLITUS WITH STAGE 3B CHRONIC KIDNEY DISEASE, WITH LONG-TERM CURRENT USE OF INSULIN: ICD-10-CM

## 2022-07-21 DIAGNOSIS — R80.9 MICROALBUMINURIA DUE TO TYPE 2 DIABETES MELLITUS: ICD-10-CM

## 2022-07-21 DIAGNOSIS — I50.42 CHRONIC COMBINED SYSTOLIC AND DIASTOLIC HEART FAILURE: ICD-10-CM

## 2022-07-21 DIAGNOSIS — E78.5 HYPERLIPIDEMIA ASSOCIATED WITH TYPE 2 DIABETES MELLITUS: ICD-10-CM

## 2022-07-21 DIAGNOSIS — G47.00 INSOMNIA, UNSPECIFIED TYPE: ICD-10-CM

## 2022-07-21 PROCEDURE — 3288F FALL RISK ASSESSMENT DOCD: CPT | Mod: CPTII,S$GLB,, | Performed by: FAMILY MEDICINE

## 2022-07-21 PROCEDURE — 1101F PT FALLS ASSESS-DOCD LE1/YR: CPT | Mod: CPTII,S$GLB,, | Performed by: FAMILY MEDICINE

## 2022-07-21 PROCEDURE — 1160F RVW MEDS BY RX/DR IN RCRD: CPT | Mod: CPTII,S$GLB,, | Performed by: FAMILY MEDICINE

## 2022-07-21 PROCEDURE — 3078F DIAST BP <80 MM HG: CPT | Mod: CPTII,S$GLB,, | Performed by: FAMILY MEDICINE

## 2022-07-21 PROCEDURE — 3074F PR MOST RECENT SYSTOLIC BLOOD PRESSURE < 130 MM HG: ICD-10-PCS | Mod: CPTII,S$GLB,, | Performed by: FAMILY MEDICINE

## 2022-07-21 PROCEDURE — 1159F MED LIST DOCD IN RCRD: CPT | Mod: CPTII,S$GLB,, | Performed by: FAMILY MEDICINE

## 2022-07-21 PROCEDURE — 1159F PR MEDICATION LIST DOCUMENTED IN MEDICAL RECORD: ICD-10-PCS | Mod: CPTII,S$GLB,, | Performed by: FAMILY MEDICINE

## 2022-07-21 PROCEDURE — 1126F AMNT PAIN NOTED NONE PRSNT: CPT | Mod: CPTII,S$GLB,, | Performed by: FAMILY MEDICINE

## 2022-07-21 PROCEDURE — 99999 PR PBB SHADOW E&M-EST. PATIENT-LVL V: ICD-10-PCS | Mod: PBBFAC,,, | Performed by: FAMILY MEDICINE

## 2022-07-21 PROCEDURE — 3288F PR FALLS RISK ASSESSMENT DOCUMENTED: ICD-10-PCS | Mod: CPTII,S$GLB,, | Performed by: FAMILY MEDICINE

## 2022-07-21 PROCEDURE — 99214 PR OFFICE/OUTPT VISIT, EST, LEVL IV, 30-39 MIN: ICD-10-PCS | Mod: S$GLB,,, | Performed by: FAMILY MEDICINE

## 2022-07-21 PROCEDURE — 3074F SYST BP LT 130 MM HG: CPT | Mod: CPTII,S$GLB,, | Performed by: FAMILY MEDICINE

## 2022-07-21 PROCEDURE — 3078F PR MOST RECENT DIASTOLIC BLOOD PRESSURE < 80 MM HG: ICD-10-PCS | Mod: CPTII,S$GLB,, | Performed by: FAMILY MEDICINE

## 2022-07-21 PROCEDURE — 1160F PR REVIEW ALL MEDS BY PRESCRIBER/CLIN PHARMACIST DOCUMENTED: ICD-10-PCS | Mod: CPTII,S$GLB,, | Performed by: FAMILY MEDICINE

## 2022-07-21 PROCEDURE — 99999 PR PBB SHADOW E&M-EST. PATIENT-LVL V: CPT | Mod: PBBFAC,,, | Performed by: FAMILY MEDICINE

## 2022-07-21 PROCEDURE — 1126F PR PAIN SEVERITY QUANTIFIED, NO PAIN PRESENT: ICD-10-PCS | Mod: CPTII,S$GLB,, | Performed by: FAMILY MEDICINE

## 2022-07-21 PROCEDURE — 1101F PR PT FALLS ASSESS DOC 0-1 FALLS W/OUT INJ PAST YR: ICD-10-PCS | Mod: CPTII,S$GLB,, | Performed by: FAMILY MEDICINE

## 2022-07-21 PROCEDURE — 99214 OFFICE O/P EST MOD 30 MIN: CPT | Mod: S$GLB,,, | Performed by: FAMILY MEDICINE

## 2022-07-21 RX ORDER — PROMETHAZINE HYDROCHLORIDE 12.5 MG/1
12.5 TABLET ORAL EVERY 6 HOURS PRN
Qty: 30 TABLET | Refills: 2 | Status: SHIPPED | OUTPATIENT
Start: 2022-07-21 | End: 2022-11-01

## 2022-07-21 NOTE — PROGRESS NOTES
Transitional Care Note  Subjective:      Patient ID: Myesha Quinones is a 82 y.o. female.  Chief Complaint: Hospital Follow Up and Hypotension    Family and/or Caretaker present at visit?  Yes.  Diagnostic tests reviewed/disposition: I have reviewed all completed as well as pending diagnostic tests at the time of discharge.  Disease/illness education: YES  Home health/community services discussion/referrals: Patient does not have home health established from hospital visit.  They do not need home health.  If needed, we will set up home health for the patient.   Establishment or re-establishment of referral orders for community resources: No other necessary community resources.   Discussion with other health care providers: No discussion with other health care providers necessary.   82 YR OLD PLEASANT FEMALE WITH DM II, CKD III, CHF W/GERALDINE, COPD, DEPRESSION AND OTHER CO MORBIDITIES PRESENTS TODAY FOR HOSPITAL DISCHARGE FOLLOW UP. SHE WAS ADMITTED 2 WEEKS AGO ON 07/08/22 AT [facility] FOR HCAP AND RESPIRATORY INSUFFICIENCY. SHE STAYED FOR 7 DAYS AND RECEIVED DIURETIC AND OXYGEN AND ANTIBIOTICS. NO ACUTE EVENTS THROUGHOUT HER STAY. SHE WAS DISCHARGED ON 07/15/22 IN STABLE CONDITION. SHE FEELS TIRED AND FATIGUE AND NEED TO SEE NEPHROLOGY.    DM II - CONTROLLED - RECENT A1C WENT UP SINCE SHE TOOK STEROIDS IN HOSPITAL - BUT SHE FOLLOWING ENDO AND ON INSULIN -  HGBA1C                   8.0 (H)             07/08/2022                CHF WITH NORMAL EF - FOLLOWS CARDIOLOGY - ON ASA    HTN - CONTROLLED    CKD III - FOLLOWS NEPHROLOGY - NO NEW ISSUES       HISTORY AS BELOW - REVIEWED IN DETAIL        Diabetes  She presents for her follow-up diabetic visit. She has type 2 diabetes mellitus. Her disease course has been stable. There are no hypoglycemic associated symptoms. Pertinent negatives for hypoglycemia include no confusion, dizziness, headaches, nervousness/anxiousness, pallor, seizures, speech difficulty or tremors.  Associated symptoms include fatigue and weakness. Pertinent negatives for diabetes include no chest pain, no polydipsia and no polyuria. There are no hypoglycemic complications. Symptoms are stable. Diabetic complications include heart disease. Risk factors for coronary artery disease include diabetes mellitus, hypertension, post-menopausal and sedentary lifestyle. Current diabetic treatment includes insulin injections and oral agent (monotherapy). She is compliant with treatment all of the time. She rarely participates in exercise. An ACE inhibitor/angiotensin II receptor blocker is being taken. She does not see a podiatrist.Eye exam is current.     Review of Systems   Constitutional: Positive for fatigue. Negative for activity change, diaphoresis and unexpected weight change.   HENT: Negative.  Negative for congestion, ear discharge, hearing loss, rhinorrhea, sore throat and voice change.    Eyes: Negative.  Negative for pain, discharge and visual disturbance.   Respiratory: Negative.  Negative for chest tightness, shortness of breath and wheezing.    Cardiovascular: Negative.  Negative for chest pain.   Gastrointestinal: Negative.  Negative for abdominal distention, anal bleeding, constipation and nausea.   Endocrine: Negative.  Negative for cold intolerance, polydipsia and polyuria.   Genitourinary: Negative.  Negative for decreased urine volume, difficulty urinating, dysuria, frequency, menstrual problem and vaginal pain.   Musculoskeletal: Negative.  Negative for arthralgias, gait problem and myalgias.   Skin: Negative.  Negative for color change, pallor and wound.   Allergic/Immunologic: Negative.  Negative for environmental allergies and immunocompromised state.   Neurological: Positive for weakness. Negative for dizziness, tremors, seizures, speech difficulty and headaches.   Hematological: Negative.  Negative for adenopathy. Does not bruise/bleed easily.   Psychiatric/Behavioral: Negative.  Negative for  agitation, confusion, decreased concentration, hallucinations, self-injury and suicidal ideas. The patient is not nervous/anxious.          PMH/PSH/FH/SH/MED/ALLERGY reviewed    Objective:       Vitals:    07/21/22 1316   BP: (!) 110/40   Pulse: (!) 54       Physical Exam  Constitutional:       General: She is not in acute distress.     Appearance: She is well-developed. She is not diaphoretic.   HENT:      Head: Normocephalic and atraumatic.      Right Ear: External ear normal.      Left Ear: External ear normal.      Nose: Nose normal.      Mouth/Throat:      Pharynx: No oropharyngeal exudate.   Eyes:      General: No scleral icterus.        Right eye: No discharge.         Left eye: No discharge.      Conjunctiva/sclera: Conjunctivae normal.      Pupils: Pupils are equal, round, and reactive to light.   Neck:      Thyroid: No thyromegaly.      Vascular: No JVD.      Trachea: No tracheal deviation.   Cardiovascular:      Rate and Rhythm: Normal rate and regular rhythm.      Heart sounds: Normal heart sounds. No murmur heard.    No friction rub. No gallop.   Pulmonary:      Effort: Pulmonary effort is normal.      Breath sounds: Normal breath sounds. No stridor. No wheezing or rales.   Chest:      Chest wall: No tenderness.   Abdominal:      General: Bowel sounds are normal. There is no distension.      Palpations: Abdomen is soft. There is no mass.      Tenderness: There is no abdominal tenderness. There is no guarding or rebound.      Hernia: No hernia is present.   Musculoskeletal:         General: No tenderness. Normal range of motion.      Cervical back: Normal range of motion and neck supple.   Lymphadenopathy:      Cervical: No cervical adenopathy.   Skin:     General: Skin is warm and dry.      Coloration: Skin is not pale.      Findings: No erythema or rash.   Neurological:      Mental Status: She is alert and oriented to person, place, and time.      Cranial Nerves: No cranial nerve deficit.      Motor:  No abnormal muscle tone.      Coordination: Coordination normal.      Deep Tendon Reflexes: Reflexes are normal and symmetric. Reflexes normal.   Psychiatric:         Behavior: Behavior normal.         Thought Content: Thought content normal.         Judgment: Judgment normal.         Assessment:       1. Hospital discharge follow-up    2. Type 2 diabetes mellitus with stage 3b chronic kidney disease, with long-term current use of insulin    3. Microalbuminuria due to type 2 diabetes mellitus    4. Current mild episode of major depressive disorder without prior episode    5. Chronic combined systolic and diastolic heart failure    6. Hyperlipidemia associated with type 2 diabetes mellitus    7. Hypertension associated with diabetes    8. Chronic obstructive pulmonary disease, unspecified COPD type    9. Nausea    10. Insomnia, unspecified type        Plan:             Myesha was seen today for hospital follow up and hypotension.    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      Hospital discharge follow up  -doing well  -no new issues    DM II  -controlled    HTN  -controlled AND LOW  -HOLD HYDRALAZINE AND LOSARTAN  -MONITOR FOR LOW AND HIGH BP    CKD STAGE 4  -FOLLOW NEPHROLOGY  -SEND STAFF MESSAGE FOR AN EARLY APPT  -ER AND NEPHRO PRECAUTIONS  GIVEN    Depression  -controlled    COPD  -stable    Spent adequate time in obtaining history and explaining differentials    25 minutes spent during this visit of which greater than 50% devoted to face-face counseling and coordination of care regarding diagnosis and management plan    Follow up in about 3 months (around 10/7/2022), or if symptoms worsen or fail to improve.

## 2022-07-25 ENCOUNTER — LAB VISIT (OUTPATIENT)
Dept: LAB | Facility: HOSPITAL | Age: 82
End: 2022-07-25
Attending: FAMILY MEDICINE
Payer: MEDICARE

## 2022-07-25 ENCOUNTER — OFFICE VISIT (OUTPATIENT)
Dept: ENDOCRINOLOGY | Facility: CLINIC | Age: 82
End: 2022-07-25
Payer: MEDICARE

## 2022-07-25 VITALS
WEIGHT: 126 LBS | HEIGHT: 63 IN | DIASTOLIC BLOOD PRESSURE: 63 MMHG | BODY MASS INDEX: 22.32 KG/M2 | SYSTOLIC BLOOD PRESSURE: 154 MMHG | HEART RATE: 69 BPM

## 2022-07-25 DIAGNOSIS — E04.2 NONTOXIC MULTINODULAR GOITER: ICD-10-CM

## 2022-07-25 DIAGNOSIS — N18.4 CHRONIC KIDNEY DISEASE, STAGE IV (SEVERE): Primary | ICD-10-CM

## 2022-07-25 DIAGNOSIS — N18.4 CKD (CHRONIC KIDNEY DISEASE) STAGE 4, GFR 15-29 ML/MIN: ICD-10-CM

## 2022-07-25 DIAGNOSIS — E11.3213 CONTROLLED TYPE 2 DIABETES MELLITUS WITH BOTH EYES AFFECTED BY MILD NONPROLIFERATIVE RETINOPATHY AND MACULAR EDEMA, WITH LONG-TERM CURRENT USE OF INSULIN: Primary | ICD-10-CM

## 2022-07-25 DIAGNOSIS — E27.8 ADRENAL CORTICAL NODULE: ICD-10-CM

## 2022-07-25 DIAGNOSIS — M81.0 OSTEOPOROSIS, UNSPECIFIED OSTEOPOROSIS TYPE, UNSPECIFIED PATHOLOGICAL FRACTURE PRESENCE: ICD-10-CM

## 2022-07-25 DIAGNOSIS — Z79.4 CONTROLLED TYPE 2 DIABETES MELLITUS WITH BOTH EYES AFFECTED BY MILD NONPROLIFERATIVE RETINOPATHY AND MACULAR EDEMA, WITH LONG-TERM CURRENT USE OF INSULIN: Primary | ICD-10-CM

## 2022-07-25 DIAGNOSIS — I50.43 ACUTE ON CHRONIC COMBINED SYSTOLIC AND DIASTOLIC CHF (CONGESTIVE HEART FAILURE): ICD-10-CM

## 2022-07-25 LAB
ANION GAP SERPL CALC-SCNC: 15 MMOL/L (ref 8–16)
BUN SERPL-MCNC: 73 MG/DL (ref 8–23)
CALCIUM SERPL-MCNC: 9 MG/DL (ref 8.7–10.5)
CHLORIDE SERPL-SCNC: 96 MMOL/L (ref 95–110)
CO2 SERPL-SCNC: 26 MMOL/L (ref 23–29)
CREAT SERPL-MCNC: 2.2 MG/DL (ref 0.5–1.4)
EST. GFR  (AFRICAN AMERICAN): 23.2 ML/MIN/1.73 M^2
EST. GFR  (NON AFRICAN AMERICAN): 20.1 ML/MIN/1.73 M^2
GLUCOSE SERPL-MCNC: 93 MG/DL (ref 70–110)
POTASSIUM SERPL-SCNC: 3.8 MMOL/L (ref 3.5–5.1)
SODIUM SERPL-SCNC: 137 MMOL/L (ref 136–145)

## 2022-07-25 PROCEDURE — 99214 PR OFFICE/OUTPT VISIT, EST, LEVL IV, 30-39 MIN: ICD-10-PCS | Mod: S$GLB,,, | Performed by: NURSE PRACTITIONER

## 2022-07-25 PROCEDURE — 1160F RVW MEDS BY RX/DR IN RCRD: CPT | Mod: CPTII,S$GLB,, | Performed by: NURSE PRACTITIONER

## 2022-07-25 PROCEDURE — 1101F PR PT FALLS ASSESS DOC 0-1 FALLS W/OUT INJ PAST YR: ICD-10-PCS | Mod: CPTII,S$GLB,, | Performed by: NURSE PRACTITIONER

## 2022-07-25 PROCEDURE — 1111F DSCHRG MED/CURRENT MED MERGE: CPT | Mod: CPTII,S$GLB,, | Performed by: NURSE PRACTITIONER

## 2022-07-25 PROCEDURE — 1126F AMNT PAIN NOTED NONE PRSNT: CPT | Mod: CPTII,S$GLB,, | Performed by: NURSE PRACTITIONER

## 2022-07-25 PROCEDURE — 80048 BASIC METABOLIC PNL TOTAL CA: CPT | Performed by: FAMILY MEDICINE

## 2022-07-25 PROCEDURE — 1159F MED LIST DOCD IN RCRD: CPT | Mod: CPTII,S$GLB,, | Performed by: NURSE PRACTITIONER

## 2022-07-25 PROCEDURE — 1160F PR REVIEW ALL MEDS BY PRESCRIBER/CLIN PHARMACIST DOCUMENTED: ICD-10-PCS | Mod: CPTII,S$GLB,, | Performed by: NURSE PRACTITIONER

## 2022-07-25 PROCEDURE — 1126F PR PAIN SEVERITY QUANTIFIED, NO PAIN PRESENT: ICD-10-PCS | Mod: CPTII,S$GLB,, | Performed by: NURSE PRACTITIONER

## 2022-07-25 PROCEDURE — 1111F PR DISCHARGE MEDS RECONCILED W/ CURRENT OUTPATIENT MED LIST: ICD-10-PCS | Mod: CPTII,S$GLB,, | Performed by: NURSE PRACTITIONER

## 2022-07-25 PROCEDURE — 99999 PR PBB SHADOW E&M-EST. PATIENT-LVL III: ICD-10-PCS | Mod: PBBFAC,,, | Performed by: NURSE PRACTITIONER

## 2022-07-25 PROCEDURE — 1101F PT FALLS ASSESS-DOCD LE1/YR: CPT | Mod: CPTII,S$GLB,, | Performed by: NURSE PRACTITIONER

## 2022-07-25 PROCEDURE — 3078F PR MOST RECENT DIASTOLIC BLOOD PRESSURE < 80 MM HG: ICD-10-PCS | Mod: CPTII,S$GLB,, | Performed by: NURSE PRACTITIONER

## 2022-07-25 PROCEDURE — 99999 PR PBB SHADOW E&M-EST. PATIENT-LVL III: CPT | Mod: PBBFAC,,, | Performed by: NURSE PRACTITIONER

## 2022-07-25 PROCEDURE — 3077F SYST BP >= 140 MM HG: CPT | Mod: CPTII,S$GLB,, | Performed by: NURSE PRACTITIONER

## 2022-07-25 PROCEDURE — 3077F PR MOST RECENT SYSTOLIC BLOOD PRESSURE >= 140 MM HG: ICD-10-PCS | Mod: CPTII,S$GLB,, | Performed by: NURSE PRACTITIONER

## 2022-07-25 PROCEDURE — 1159F PR MEDICATION LIST DOCUMENTED IN MEDICAL RECORD: ICD-10-PCS | Mod: CPTII,S$GLB,, | Performed by: NURSE PRACTITIONER

## 2022-07-25 PROCEDURE — 3288F FALL RISK ASSESSMENT DOCD: CPT | Mod: CPTII,S$GLB,, | Performed by: NURSE PRACTITIONER

## 2022-07-25 PROCEDURE — 3288F PR FALLS RISK ASSESSMENT DOCUMENTED: ICD-10-PCS | Mod: CPTII,S$GLB,, | Performed by: NURSE PRACTITIONER

## 2022-07-25 PROCEDURE — 3078F DIAST BP <80 MM HG: CPT | Mod: CPTII,S$GLB,, | Performed by: NURSE PRACTITIONER

## 2022-07-25 PROCEDURE — 99214 OFFICE O/P EST MOD 30 MIN: CPT | Mod: S$GLB,,, | Performed by: NURSE PRACTITIONER

## 2022-07-25 RX ORDER — INSULIN GLARGINE 100 [IU]/ML
10 INJECTION, SOLUTION SUBCUTANEOUS 2 TIMES DAILY
Qty: 30 ML | Refills: 3 | Status: SHIPPED | OUTPATIENT
Start: 2022-07-25 | End: 2022-11-01

## 2022-07-25 RX ORDER — BEVACIZUMAB 100 MG/4ML
INJECTION, SOLUTION INTRAVENOUS
COMMUNITY
Start: 2022-05-23 | End: 2022-11-01

## 2022-07-25 RX ORDER — FUROSEMIDE 20 MG/1
TABLET ORAL
COMMUNITY
Start: 2022-04-30 | End: 2022-11-01

## 2022-07-25 RX ORDER — DILTIAZEM HYDROCHLORIDE 240 MG/1
CAPSULE, COATED, EXTENDED RELEASE ORAL
COMMUNITY
Start: 2022-07-09 | End: 2022-09-19

## 2022-07-25 NOTE — ASSESSMENT & PLAN NOTE
-- Labs prior to follow up. Include fructosamine.   -- A1c goal < or =7.5%.  -- Medications discussed:  MFM - stopped due to CKD  GLP1-DPP4   SARAH   SGLT2   Reviewed potential adverse effects of SGLT-2 inhibitors, including genital mycotic infections, slightly increased risk of UTI, hypersensitivity, hypotension, and hyperkalemia. Advised to maintain water intake of 8-10 cups per day. Advised we need to check chemistry panel at baseline and 2 weeks after starting. Discussed FDA warning reports of ketoacidosis associated with SGLT-2 inhibitors. Advised to seek immediate medical attention and stop the medication if symptoms such as difficulty breathing, nausea, vomiting, abdominal pain, confusion, and unusual fatigue/sleepiness. Discussed possible precipitating factors including major illness/reduced food and fluid intake (advised to stop under these circumstances), and reduced insulin dose. Discussed possible effects of increased fracture risk/decreased bone density. Discussed reports of increased risk of leg and foot amputations with canagliflozin and need to seek urgent care if developed new pain or tenderness, sores or ulcers, or infections in legs/feet.   Insulin   -- Reviewed logs/CGM:  Midday hyperglycemia.   Instructed to send glucose logs in 7 days.    Reach out to me sooner for any glucose <70 or consistently >200.  -- Hyperglycemia likely due to Lantus tapering off. Will try BID dosing. Consider professional CGM - she is agreeable.  -- Medication Changes:   CHANGE  Lantus 10units twice daily  - 12 hours apart   Tradjenta 5mg daily  -- Reviewed goals of therapy are to get the best control we can without hypoglycemia.  -- Reviewed patient's current insulin regimen. Clarified proper insulin dose and timing in relation to meals, etc. Insulin injection sites and proper rotation instructed.    -- Advised frequent self blood glucose monitoring.  Patient encouraged to document glucose results and bring them to every  clinic visit.  -- Hypoglycemia precautions discussed. Instructed on precautions before driving.    -- Call for Bg repeatedly < 90 or > 180.   -- Close adherence to lifestyle changes recommended.   -- Periodic follow ups for eye evaluations, foot care and dental care suggested.

## 2022-07-25 NOTE — PROGRESS NOTES
Subjective:      Patient ID: Myesha Quinones is a 82 y.o. female.    Chief Complaint:  Diabetes    History of Present Illness  Myesha Quinones is here for follow up of T2DM, MNG, osteoporosis and adrenal nodule.  Previously seen by me on 6/2022.    Patient arrives with her son.  Since LOV she was in the hospital x2 - COVID and pneumonia     With regards to diabetes:    Diagnosed: 1993  Known complications:  DKA -  RN +  PN +  Nephropathy + follows with nephrology  CAD +    Current regimen:  Lantus 22 units daily (around 1pm)  Tradjenta 5mg daily     Other medications tried:  Metformin - CKD   Tradjenta  Farxiga - never started  Trulicity - cost    Glucose Monitor:   2 times a day testing  Log reviewed: log provided and reviewed, scanned in media tab    Hypoglycemia:  Denies  Knows how to correct with 15 grams of carbs- juice, coke, or a peppermint.     Diet/Exercise:  Eats 3 meals a day.   B: oatmeal, 1/2 banana / cereal/ egg  L: humana prepared meals - meat, vegetable, rice or pasta (small portion)  D: prepared meal / meat, vegetable  Snacks : occasionally - sugar free jello or pudding, nuts, fruit.   Drinks : water, coffee, NO soft drinks.  Exercise: None.      Education - last visit: in the past  Eye Exam: 10/2021  Podiatry: 11/2019    Diabetes Management Status    Hemoglobin A1C   Date Value Ref Range Status   07/08/2022 8.0 (H) 4.0 - 5.6 % Final     Comment:     ADA Screening Guidelines:  5.7-6.4%  Consistent with prediabetes  >or=6.5%  Consistent with diabetes    High levels of fetal hemoglobin interfere with the HbA1C  assay. Heterozygous hemoglobin variants (HbS, HgC, etc)do  not significantly interfere with this assay.   However, presence of multiple variants may affect accuracy.     06/21/2022 7.9 (H) 4.0 - 5.6 % Final     Comment:     ADA Screening Guidelines:  5.7-6.4%  Consistent with prediabetes  >or=6.5%  Consistent with diabetes    High levels of fetal hemoglobin interfere with the  HbA1C  assay. Heterozygous hemoglobin variants (HbS, HgC, etc)do  not significantly interfere with this assay.   However, presence of multiple variants may affect accuracy.     02/22/2022 5.9 (H) 4.0 - 5.6 % Final     Comment:     ADA Screening Guidelines:  5.7-6.4%  Consistent with prediabetes  >or=6.5%  Consistent with diabetes    High levels of fetal hemoglobin interfere with the HbA1C  assay. Heterozygous hemoglobin variants (HbS, HgC, etc)do  not significantly interfere with this assay.   However, presence of multiple variants may affect accuracy.         Statin: Taking  ACE/ARB: Taking  Screening or Prevention Patient's value Goal Complete/Controlled?   HgA1C Testing and Control   Lab Results   Component Value Date    HGBA1C 8.0 (H) 07/08/2022      Annually/Less than 8% Yes   Lipid profile : 04/03/2022 Annually Yes   LDL control Lab Results   Component Value Date    LDLCALC 29.8 (L) 04/03/2022    Annually/Less than 100 mg/dl  Yes   Nephropathy screening Lab Results   Component Value Date    LABMICR 542.0 04/03/2022     Lab Results   Component Value Date    PROTEINUA 2+ (A) 07/09/2022    Annually Yes   Blood pressure BP Readings from Last 1 Encounters:   07/25/22 (!) 154/63    Less than 140/90 No   Dilated retinal exam : 07/07/2022 Annually Yes   Foot exam   : 07/08/2022 Annually No     With regards to thyroid nodule:    Thyroid US:   11/19/2020  1.  Thyroid gland is normal in size with heterogenous echotexture.  2.  Two nodules in the right thyroid described above.  None meet criteria for fine-needle aspiration.  3.  Left superior lobe nodule is unchanged.  It does not meet criteria for fine-needle aspiration.  RECOMMENDATIONS:  Follow-up ultrasound in 1-2 years is recommended    FNA:   Left nodule: Benign - 2016  Right nodule: Benign - 2014    Signs or Symptoms:   Difficulty breathing: Denies  Difficulty swallowing: Denies  Voice Changes: Hoarse  FH of thyroid cancer: Denies  Personal history of radiation  "treatment or exposure: Denies     Lab Results   Component Value Date    TSH 0.422 07/08/2022    FREET4 1.22 09/16/2016       Denies signs or symptoms of hyper or hypothyroidism.    With regards to osteoporosis:     BMD:   4/30/2018  Osteopenia of the femoral neck and lumbar spine -- FRAX score supports treatment  RECOMMENDATIONS of [facility]:  1.  Calcium 4776-9302 mg daily and vitamin D 800-1000 units daily, adequate exercise.  2.  Recommended therapy Consider bisphosphonates (alendronate, risedronate, ibandronate, zolendronic acid) or denosumab.  3.  Repeat BMD in 2 years    Medications:   Alendronate - did not tolerate due to bone pain.  She was also offered prolia, but she read about the side-effects and is not interested in starting it or any other therapy for osteoporosis.  Calcium intake: None  Vit D intake: OTC Vit D3 2000iu daily     Weight bearing exercise: None  Falls: Denies  Fractures: Denies    With regards to adrenal nodule:     Was found to have bilateral adrenal nodules found incidentally on CT scan "many" years ago. She reports she was initially having yearly CT scans to follow them, but it was recommended to stop doing them due to lack of growth. There are two CT scans in our system (2016 and 2017).     CT Abd Pel WO Con  12/7/2017  A right adrenal nodule is stable in size when compared to 3/10/16, measuring 2.5 cm.  A left adrenal nodule is slightly larger on today's examination measuring 2.0 cm, previously 1.7 cm.    12/7/2019  There is a left adrenal nodule measuring 2.6 x 2.1 cm, as well as a right adrenal nodule measuring 2.7 x 1.7 cm, which are stable in size and appearance when compared to CT 12/07/2017.    Reports a functional workup was done in the past. Unable to find record of this in Epic or Legacy - evaluation was done prior?    Review of Systems   Constitutional: Negative for fatigue.   Eyes: Negative for visual disturbance.   Respiratory: " "Positive for shortness of breath (on exertion).    Cardiovascular: Negative for chest pain.   Gastrointestinal: Negative for abdominal pain.   Musculoskeletal: Negative for arthralgias.   Skin: Negative for wound.   Neurological: Negative for headaches.       Objective:   Physical Exam  Vitals reviewed.   Neck:      Thyroid: No thyromegaly (irregular shaped gland).   Cardiovascular:      Rate and Rhythm: Normal rate.      Heart sounds: Heart sounds are distant.   Pulmonary:      Effort: Pulmonary effort is normal.   Abdominal:      Palpations: Abdomen is soft.   Musculoskeletal:      Right lower le+ Pitting Edema present.      Left lower le+ Pitting Edema present.         Visit Vitals  BP (!) 154/63   Pulse 69   Ht 5' 3" (1.6 m)   Wt 57.2 kg (125 lb 15.9 oz)   LMP  (LMP Unknown)   BMI 22.32 kg/m²     Injection sites are without edema or erythema. No lipo hypertropthy or atrophy.    Body mass index is 22.32 kg/m².    Lab Review:   Lab Results   Component Value Date    HGBA1C 8.0 (H) 2022    HGBA1C 7.9 (H) 2022    HGBA1C 5.9 (H) 2022       Lab Results   Component Value Date    CHOL 96 (L) 2022    HDL 39 (L) 2022    LDLCALC 29.8 (L) 2022    TRIG 136 2022    CHOLHDL 40.6 2022     Lab Results   Component Value Date     (L) 2022    K 4.8 2022    CL 88 (L) 2022    CO2 27 2022     (H) 2022    BUN 87 (H) 2022    CREATININE 2.4 (H) 2022    CALCIUM 9.1 2022    PROT 6.4 07/15/2022    ALBUMIN 2.4 (L) 07/15/2022    BILITOT 0.3 07/15/2022    ALKPHOS 91 07/15/2022    AST 15 07/15/2022    ALT 16 07/15/2022    ANIONGAP 14 2022    ESTGFRAFRICA 20.9 (A) 2022    EGFRNONAA 18.1 (A) 2022    TSH 0.422 2022     Vit D, 25-Hydroxy   Date Value Ref Range Status   2022 36 30 - 96 ng/mL Final     Comment:     Vitamin D deficiency.........<10 ng/mL                              Vitamin D " insufficiency......10-29 ng/mL       Vitamin D sufficiency........> or equal to 30 ng/mL  Vitamin D toxicity............>100 ng/mL       Assessment and Plan     1. 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 Nontoxic multinodular goiter     3. Osteoporosis, unspecified osteoporosis type, unspecified pathological fracture presence     4. Adrenal cortical nodule: repeat CT 6/2016     5. CKD (chronic kidney disease) stage 4, GFR 15-29 ml/min     6. 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 Include fructosamine.   -- A1c goal < or =7.5%.  -- Medications discussed:  MFM - stopped due to CKD  GLP1-DPP4   SARAH   SGLT2   Reviewed potential adverse effects of SGLT-2 inhibitors, including genital mycotic infections, slightly increased risk of UTI, hypersensitivity, hypotension, and hyperkalemia. Advised to maintain water intake of 8-10 cups per day. Advised we need to check chemistry panel at baseline and 2 weeks after starting. Discussed FDA warning reports of ketoacidosis associated with SGLT-2 inhibitors. Advised to seek immediate medical attention and stop the medication if symptoms such as difficulty breathing, nausea, vomiting, abdominal pain, confusion, and unusual fatigue/sleepiness. Discussed possible precipitating factors including major illness/reduced food and fluid intake (advised to stop under these circumstances), and reduced insulin dose. Discussed possible effects of increased fracture risk/decreased bone density. Discussed reports of  increased risk of leg and foot amputations with canagliflozin and need to seek urgent care if developed new pain or tenderness, sores or ulcers, or infections in legs/feet.   Insulin   -- Reviewed logs/CGM:  Midday hyperglycemia.   Instructed to send glucose logs in 7 days.    Reach out to me sooner for any glucose <70 or consistently >200.  -- Hyperglycemia likely due to Lantus tapering off. Will try BID dosing. Consider professional CGM - she is agreeable.  -- Medication Changes:   CHANGE  Lantus 10units twice daily  - 12 hours apart   Tradjenta 5mg daily  -- Reviewed goals of therapy are to get the best control we can without hypoglycemia.  -- Reviewed patient's current insulin regimen. Clarified proper insulin dose and timing in relation to meals, etc. Insulin injection sites and proper rotation instructed.    -- Advised frequent self blood glucose monitoring.  Patient encouraged to document glucose results and bring them to every clinic visit.  -- Hypoglycemia precautions discussed. Instructed on precautions before driving.    -- Call for Bg repeatedly < 90 or > 180.   -- Close adherence to lifestyle changes recommended.   -- Periodic follow ups for eye evaluations, foot care and dental care suggested.    Nontoxic multinodular goiter  -- Thyroid US due 11/2022.  -- Denies compressive symptoms.  -- FNA:   Left nodule: Benign - 2016  Right nodule: Benign - 2014    Osteoporosis  -- Refused BMD due to it not changing her approach to management.  -- Patient does not wish to pursue antiresorptive treatments citing previous side-effects and wanting to avoid new medications due to fear of side-effects (Prolia specifically).  -- Taking vitamin D 2000 IU daily and she gets calcium in her diet.  -- Suggested walking for weight bearing exercise.    Adrenal cortical nodule: repeat CT 6/2016  -- Stable on imaging for over a decade suggests benign adenomas. She thinks she had a biochemical workup in the past, but the results  aren't available in our system as they predate 2004. Blood pressure is reasonably well-controlled. No hypokalemia. No symptoms to suggest pheo, and no overt Cushing syndrome. Subclinical Cushing is a possibility, but the benefit vs. risk of adrenalectomy would very likely favor conservative management. Therefore, will hold off on further testing at this point since it would be unlikely to alter management.    CKD (chronic kidney disease) stage 4, GFR 15-29 ml/min  -- Optimize glucose control.     Acute on chronic combined systolic and diastolic CHF (congestive heart failure)  -- Continue following with cardiology.        Follow up in about 3 months (around 10/25/2022).

## 2022-08-01 ENCOUNTER — TELEPHONE (OUTPATIENT)
Dept: ENDOCRINOLOGY | Facility: CLINIC | Age: 82
End: 2022-08-01
Payer: MEDICARE

## 2022-08-01 DIAGNOSIS — Z79.4 CONTROLLED TYPE 2 DIABETES MELLITUS WITH BOTH EYES AFFECTED BY MILD NONPROLIFERATIVE RETINOPATHY AND MACULAR EDEMA, WITH LONG-TERM CURRENT USE OF INSULIN: Primary | ICD-10-CM

## 2022-08-01 DIAGNOSIS — E11.3213 CONTROLLED TYPE 2 DIABETES MELLITUS WITH BOTH EYES AFFECTED BY MILD NONPROLIFERATIVE RETINOPATHY AND MACULAR EDEMA, WITH LONG-TERM CURRENT USE OF INSULIN: Primary | ICD-10-CM

## 2022-08-01 NOTE — TELEPHONE ENCOUNTER
Received and reviewed glucose log, scanned in media tab.    Current Regimen:  Lantus 11units twice daily  - 12 hours apart   Tradjenta 5mg daily     Fasting 99, 85, 105, 83, 106, 99, 117, 121  Midday 132, 164, 151  Bedtime 219, 213, 253, 246, 246, 261    Hypoglycemia: NONE  Hyperglycemia: evening    Recommended Medication Changes:  Professional CGM         Medication list updated.  Findings and recommendations reviewed with the patient.  All questions answered.

## 2022-08-03 LAB
ACID FAST MOD KINY STN SPEC: NORMAL
MYCOBACTERIUM SPEC QL CULT: NORMAL

## 2022-08-04 ENCOUNTER — TELEPHONE (OUTPATIENT)
Dept: FAMILY MEDICINE | Facility: CLINIC | Age: 82
End: 2022-08-04
Payer: MEDICARE

## 2022-08-04 NOTE — TELEPHONE ENCOUNTER
----- Message from Mansi Borrero MD sent at 8/1/2022 11:15 AM CDT -----  Dora please schedule her next Monday, Aug 8th  for 13:45  ----- Message -----  From: [place] Michael MD  Sent: 7/21/2022   3:04 PM CDT  To: MD Agustin Adkins,    This is Dayton. I am writing regards to our mutual patient. She is recently discharged from hospital and you are following her for CKD III. Her renal functions are declining along with hyponatremia and hypochloremia. She is on lasix for heart failure and fluid overload. Patient reported that she has appointment with you next month but I do recommend her to see you sooner than that if possible. She will end up on hospital if we give her more time for the appointment. She may need erythropoietin for her anemia as well if needed. Please look into that and help us.    Appreciate all the help.    Dayton Michael MD

## 2022-08-06 ENCOUNTER — CLINICAL SUPPORT (OUTPATIENT)
Dept: CARDIOLOGY | Facility: HOSPITAL | Age: 82
End: 2022-08-06
Payer: MEDICARE

## 2022-08-06 ENCOUNTER — LAB VISIT (OUTPATIENT)
Dept: LAB | Facility: HOSPITAL | Age: 82
End: 2022-08-06
Attending: INTERNAL MEDICINE
Payer: MEDICARE

## 2022-08-06 DIAGNOSIS — I47.20 VENTRICULAR TACHYCARDIA: ICD-10-CM

## 2022-08-06 DIAGNOSIS — N04.9 NEPHROTIC SYNDROME: ICD-10-CM

## 2022-08-06 DIAGNOSIS — I44.2 ATRIOVENTRICULAR BLOCK, COMPLETE: ICD-10-CM

## 2022-08-06 DIAGNOSIS — I50.42 CHRONIC COMBINED SYSTOLIC (CONGESTIVE) AND DIASTOLIC (CONGESTIVE) HEART FAILURE: ICD-10-CM

## 2022-08-06 DIAGNOSIS — Z95.810 PRESENCE OF AUTOMATIC (IMPLANTABLE) CARDIAC DEFIBRILLATOR: ICD-10-CM

## 2022-08-06 DIAGNOSIS — E87.1 HYPONATREMIA: ICD-10-CM

## 2022-08-06 DIAGNOSIS — N18.4 CHRONIC KIDNEY DISEASE, STAGE IV (SEVERE): ICD-10-CM

## 2022-08-06 DIAGNOSIS — I42.9 CARDIOMYOPATHY, UNSPECIFIED: ICD-10-CM

## 2022-08-06 PROCEDURE — 82570 ASSAY OF URINE CREATININE: CPT | Performed by: INTERNAL MEDICINE

## 2022-08-06 PROCEDURE — 84300 ASSAY OF URINE SODIUM: CPT | Performed by: INTERNAL MEDICINE

## 2022-08-06 PROCEDURE — 83935 ASSAY OF URINE OSMOLALITY: CPT | Performed by: INTERNAL MEDICINE

## 2022-08-06 PROCEDURE — 82043 UR ALBUMIN QUANTITATIVE: CPT | Performed by: INTERNAL MEDICINE

## 2022-08-06 PROCEDURE — 93296 REM INTERROG EVL PM/IDS: CPT | Performed by: INTERNAL MEDICINE

## 2022-08-06 PROCEDURE — 81000 URINALYSIS NONAUTO W/SCOPE: CPT | Performed by: INTERNAL MEDICINE

## 2022-08-08 LAB
ALBUMIN/CREAT UR: 2845.8 UG/MG (ref 0–30)
BACTERIA #/AREA URNS HPF: ABNORMAL /HPF
BILIRUB UR QL STRIP: NEGATIVE
CLARITY UR: CLEAR
COLOR UR: YELLOW
CREAT UR-MCNC: 38.9 MG/DL (ref 15–325)
CREAT UR-MCNC: 38.9 MG/DL (ref 15–325)
GLUCOSE UR QL STRIP: NEGATIVE
HGB UR QL STRIP: NEGATIVE
HYALINE CASTS #/AREA URNS LPF: 0 /LPF
KETONES UR QL STRIP: NEGATIVE
LEUKOCYTE ESTERASE UR QL STRIP: ABNORMAL
MICROALBUMIN UR DL<=1MG/L-MCNC: 1107 UG/ML
MICROSCOPIC COMMENT: ABNORMAL
NITRITE UR QL STRIP: NEGATIVE
OSMOLALITY UR: 324 MOSM/KG (ref 50–1200)
PH UR STRIP: 7 [PH] (ref 5–8)
PROT UR QL STRIP: ABNORMAL
PROT UR-MCNC: 168 MG/DL (ref 0–15)
PROT/CREAT UR: 4.32 MG/G{CREAT} (ref 0–0.2)
RBC #/AREA URNS HPF: 2 /HPF (ref 0–4)
SODIUM UR-SCNC: 73 MMOL/L (ref 20–250)
SP GR UR STRIP: 1.01 (ref 1–1.03)
SQUAMOUS #/AREA URNS HPF: 1 /HPF
URN SPEC COLLECT METH UR: ABNORMAL
UROBILINOGEN UR STRIP-ACNC: NEGATIVE EU/DL
WBC #/AREA URNS HPF: 9 /HPF (ref 0–5)
YEAST URNS QL MICRO: ABNORMAL

## 2022-08-17 LAB
FUNGUS SPEC CULT: NORMAL
FUNGUS SPEC CULT: NORMAL

## 2022-08-22 ENCOUNTER — DOCUMENT SCAN (OUTPATIENT)
Dept: HOME HEALTH SERVICES | Facility: HOSPITAL | Age: 82
End: 2022-08-22
Payer: MEDICARE

## 2022-08-22 ENCOUNTER — DOCUMENT SCAN (OUTPATIENT)
Dept: HOME HEALTH SERVICES | Facility: HOSPITAL | Age: 82
End: 2022-08-22

## 2022-08-25 ENCOUNTER — PROCEDURE VISIT (OUTPATIENT)
Dept: DERMATOLOGY | Facility: CLINIC | Age: 82
End: 2022-08-25
Payer: MEDICARE

## 2022-08-25 VITALS
DIASTOLIC BLOOD PRESSURE: 66 MMHG | HEART RATE: 79 BPM | SYSTOLIC BLOOD PRESSURE: 179 MMHG | WEIGHT: 131 LBS | HEIGHT: 63 IN | BODY MASS INDEX: 23.21 KG/M2

## 2022-08-25 DIAGNOSIS — C44.42 SQUAMOUS CELL CARCINOMA OF SKIN OF NECK: Primary | ICD-10-CM

## 2022-08-25 PROCEDURE — 17311: ICD-10-PCS | Mod: 51,S$GLB,, | Performed by: DERMATOLOGY

## 2022-08-25 PROCEDURE — 17311 MOHS 1 STAGE H/N/HF/G: CPT | Mod: 51,S$GLB,, | Performed by: DERMATOLOGY

## 2022-08-25 PROCEDURE — 13132 PR RECMPL WND HEAD,FAC,HAND 2.6-7.5 CM: ICD-10-PCS | Mod: S$GLB,,, | Performed by: DERMATOLOGY

## 2022-08-25 PROCEDURE — 13132 CMPLX RPR F/C/C/M/N/AX/G/H/F: CPT | Mod: S$GLB,,, | Performed by: DERMATOLOGY

## 2022-08-25 PROCEDURE — 99499 UNLISTED E&M SERVICE: CPT | Mod: S$GLB,,, | Performed by: DERMATOLOGY

## 2022-08-25 PROCEDURE — 99499 NO LOS: ICD-10-PCS | Mod: S$GLB,,, | Performed by: DERMATOLOGY

## 2022-08-25 NOTE — PROGRESS NOTES
PROCEDURE: Mohs' Micrographic Surgery    INDICATION: Biopsy-proven skin cancer of cosmetically and functionally important areas, including head, neck, genital, hand, foot, or areas known for having difficulty in healing, such as the lower anterior legs. Tumor with ill-defined borders.    REFERRING PROVIDER: Leonides Fuentes MD     CASE NUMBER:     ANESTHETIC: 3 cc 0.5% Lidocaine with Epi 1:200,000 mixed 1:1 with 0.5% Bupivacaine    SURGICAL PREP: Hibiclens    SURGEON: Diaz Roche MD    ASSISTANTS: Inez Gray PA-C and Shawna Wen, Surg Tech    PREOPERATIVE DIAGNOSIS: squamous cell carcinoma- invasive, moderately differentiated    POSTOPERATIVE DIAGNOSIS: squamous cell carcinoma    PATHOLOGIC DIAGNOSIS: squamous cell carcinoma- invasive, moderately differentiated    HISTOLOGY OF SPECIMENS IN FIRST STAGE:   Tumor Type: No tumor seen.    STAGES OF MOHS' SURGERY PERFORMED: 1    TUMOR-FREE PLANE ACHIEVED: Yes    HEMOSTASIS: thermal cautery     SPECIMENS: 2    LOCATION: right posterior neck. Location verified with Dr. Fuentes's clinical photograph. Patient also verified location by looking at photo taken prior to procedure.     INITIAL LESION SIZE: 0.5 x 0.8 cm    FINAL DEFECT SIZE: 0.9 x 1.5 cm    WOUND REPAIR/DISPOSITION: The patient tolerated Mohs' Micrographic Surgery for a squamous cell carcinoma very well. When the tumor was completely removed, a repair of the surgical defect was undertaken.       PROCEDURE: Complex Linear Repair    INDICATION: Status post Mohs' Micrographic Surgery for squamous cell carcinoma.    CASE NUMBER:     SURGEON: Diaz Roche MD    ASSISTANTS: Inez Gray PA-C and Delmy Bauer MA    ANESTHETIC: 3 cc 1% Lidocaine with Epinephrine 1:100,000    SURGICAL PREP: Hibiclens, prepped by Delmy Bauer MA    LOCATION: right posterior neck    DEFECT SIZE: 0.9 x 1.5 cm    WOUND REPAIR/DISPOSITION:  After the patient's carcinoma had been completely removed with Mohs' Micrographic  "Surgery, a repair of the surgical defect was undertaken. The patient was returned to the operating suite where the area of right posterior neck was prepped, draped, and anesthetized in the usual sterile fashion. The wound was widely undermined in all directions. The wound was undermined to a distance at least the maximum width of the defect as measured perpendicular to the closure line along at least one entire edge of the defect, in this case 2 cm. Then, thermal cautery was used to obtain meticulous hemostasis. 4-0 Vicryl buried vertical mattress sutures were placed into the subcutaneous and dermal plane to close the wound and giles the cutaneous wound edge. Bilateral dog ears were identified and were removed by a standard Burow's triangle technique. The cutaneous wound edges were closed using interrupted 5-0 Prolene suture.    The patient tolerated the procedure well.    The area was cleaned and dressed appropriately and the patient was given wound care instructions, as well as appointment for follow-up evaluation and suture removal in 7 days.    LENGTH OF REPAIR: 3 cm    Vitals:    08/25/22 1056 08/25/22 1251   BP: (!) 169/66 (!) 179/66   BP Location: Left arm    Patient Position: Sitting    BP Method: Small (Automatic)    Pulse: 77 79   Weight: 59.4 kg (131 lb)    Height: 5' 3" (1.6 m)        "

## 2022-08-26 ENCOUNTER — PROCEDURE VISIT (OUTPATIENT)
Dept: OPHTHALMOLOGY | Facility: CLINIC | Age: 82
End: 2022-08-26
Payer: MEDICARE

## 2022-08-26 DIAGNOSIS — H35.033 HYPERTENSIVE RETINOPATHY, BILATERAL: ICD-10-CM

## 2022-08-26 DIAGNOSIS — H35.012 RETINAL MACROANEURYSM OF LEFT EYE: ICD-10-CM

## 2022-08-26 DIAGNOSIS — E11.3213 CONTROLLED TYPE 2 DIABETES MELLITUS WITH BOTH EYES AFFECTED BY MILD NONPROLIFERATIVE RETINOPATHY AND MACULAR EDEMA, WITH LONG-TERM CURRENT USE OF INSULIN: Primary | ICD-10-CM

## 2022-08-26 DIAGNOSIS — H43.12 VITREOUS HEMORRHAGE, LEFT: ICD-10-CM

## 2022-08-26 DIAGNOSIS — Z79.4 CONTROLLED TYPE 2 DIABETES MELLITUS WITH BOTH EYES AFFECTED BY MILD NONPROLIFERATIVE RETINOPATHY AND MACULAR EDEMA, WITH LONG-TERM CURRENT USE OF INSULIN: Primary | ICD-10-CM

## 2022-08-26 PROCEDURE — 92014 COMPRE OPH EXAM EST PT 1/>: CPT | Mod: S$GLB,,, | Performed by: OPHTHALMOLOGY

## 2022-08-26 PROCEDURE — 92134 CPTRZ OPH DX IMG PST SGM RTA: CPT | Mod: S$GLB,,, | Performed by: OPHTHALMOLOGY

## 2022-08-26 PROCEDURE — 92134 POSTERIOR SEGMENT OCT RETINA (OCULAR COHERENCE TOMOGRAPHY)-BOTH EYES: ICD-10-PCS | Mod: S$GLB,,, | Performed by: OPHTHALMOLOGY

## 2022-08-26 PROCEDURE — 92014 PR EYE EXAM, EST PATIENT,COMPREHESV: ICD-10-PCS | Mod: S$GLB,,, | Performed by: OPHTHALMOLOGY

## 2022-08-26 NOTE — PROGRESS NOTES
HPI     6 wk OCT/JOSAFAT OS dilate OS    Pt states her va is stable since last visit. Stable OD. No new symptoms or   concerns today.    No flashes  No floaters  No pain  Gtts: AT's PRN OU    Last edited by Gracie Feliciano on 8/26/2022 11:14 AM. (History)          OCT - OD trace parafoveal ME  OS - inferior fibrosis at TORSTEN site - improved    Prior FA - Foveal MA's OS   No NV of NP OU      Lost  to COVID complications 12/20    A/P    1. 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 DME OS  S/p Avastin OS x 5  S/p Ozurdex OS x 5 9/19  S/p Iluvien 9/19    Doing well, will need chronic steroid  - may need additional Iluvien OS soon  Watch OD for now    Can consider light focal/micropulse to parafoveal MA's OS if worsens      3. HTN Ret OU  BS/BP/chol control  5/22 TORSTEN OS with SRF and preretinal heme  S/p Avastin OS x 2    TORSTEN ruptured and involuted - no tx today    4. PCIOL OU  With small PCO      3 months OCT and dilate

## 2022-08-30 LAB
ACID FAST MOD KINY STN SPEC: NORMAL
MYCOBACTERIUM SPEC QL CULT: NORMAL

## 2022-08-31 ENCOUNTER — HOSPITAL ENCOUNTER (OUTPATIENT)
Dept: INTERVENTIONAL RADIOLOGY/VASCULAR | Facility: HOSPITAL | Age: 82
Discharge: HOME | End: 2022-08-31
Attending: INTERNAL MEDICINE
Payer: MEDICARE

## 2022-08-31 VITALS
HEIGHT: 62 IN | HEART RATE: 69 BPM | BODY MASS INDEX: 23.92 KG/M2 | OXYGEN SATURATION: 95 % | SYSTOLIC BLOOD PRESSURE: 163 MMHG | RESPIRATION RATE: 16 BRPM | TEMPERATURE: 98 F | DIASTOLIC BLOOD PRESSURE: 70 MMHG | WEIGHT: 130 LBS

## 2022-08-31 DIAGNOSIS — N04.9 NEPHROTIC SYNDROME: ICD-10-CM

## 2022-08-31 LAB
ALBUMIN SERPL BCP-MCNC: 3.2 G/DL (ref 3.5–5.2)
ALP SERPL-CCNC: 100 U/L (ref 55–135)
ALT SERPL W/O P-5'-P-CCNC: 13 U/L (ref 10–44)
ANION GAP SERPL CALC-SCNC: 15 MMOL/L (ref 8–16)
AST SERPL-CCNC: 19 U/L (ref 10–40)
BASOPHILS # BLD AUTO: 0.03 K/UL (ref 0–0.2)
BASOPHILS NFR BLD: 0.4 % (ref 0–1.9)
BILIRUB SERPL-MCNC: 0.4 MG/DL (ref 0.1–1)
BUN SERPL-MCNC: 49 MG/DL (ref 8–23)
CALCIUM SERPL-MCNC: 8.8 MG/DL (ref 8.7–10.5)
CHLORIDE SERPL-SCNC: 102 MMOL/L (ref 95–110)
CO2 SERPL-SCNC: 21 MMOL/L (ref 23–29)
CREAT SERPL-MCNC: 2.6 MG/DL (ref 0.5–1.4)
DIFFERENTIAL METHOD: ABNORMAL
EOSINOPHIL # BLD AUTO: 0.5 K/UL (ref 0–0.5)
EOSINOPHIL NFR BLD: 7.1 % (ref 0–8)
ERYTHROCYTE [DISTWIDTH] IN BLOOD BY AUTOMATED COUNT: 14.6 % (ref 11.5–14.5)
EST. GFR  (NO RACE VARIABLE): 18 ML/MIN/1.73 M^2
GLUCOSE SERPL-MCNC: 134 MG/DL (ref 70–110)
HCT VFR BLD AUTO: 28.2 % (ref 37–48.5)
HGB BLD-MCNC: 8.9 G/DL (ref 12–16)
IMM GRANULOCYTES # BLD AUTO: 0.04 K/UL (ref 0–0.04)
IMM GRANULOCYTES NFR BLD AUTO: 0.6 % (ref 0–0.5)
INR PPP: 1 (ref 0.8–1.2)
LYMPHOCYTES # BLD AUTO: 1.1 K/UL (ref 1–4.8)
LYMPHOCYTES NFR BLD: 15.4 % (ref 18–48)
MCH RBC QN AUTO: 29.7 PG (ref 27–31)
MCHC RBC AUTO-ENTMCNC: 31.6 G/DL (ref 32–36)
MCV RBC AUTO: 94 FL (ref 82–98)
MONOCYTES # BLD AUTO: 0.6 K/UL (ref 0.3–1)
MONOCYTES NFR BLD: 8 % (ref 4–15)
NEUTROPHILS # BLD AUTO: 5 K/UL (ref 1.8–7.7)
NEUTROPHILS NFR BLD: 68.5 % (ref 38–73)
NRBC BLD-RTO: 0 /100 WBC
PLATELET # BLD AUTO: 195 K/UL (ref 150–450)
PMV BLD AUTO: 10.8 FL (ref 9.2–12.9)
POCT GLUCOSE: 147 MG/DL (ref 70–110)
POTASSIUM SERPL-SCNC: 4.1 MMOL/L (ref 3.5–5.1)
PROT SERPL-MCNC: 7 G/DL (ref 6–8.4)
PROTHROMBIN TIME: 10.8 SEC (ref 9–12.5)
RBC # BLD AUTO: 3 M/UL (ref 4–5.4)
SODIUM SERPL-SCNC: 138 MMOL/L (ref 136–145)
WBC # BLD AUTO: 7.22 K/UL (ref 3.9–12.7)

## 2022-08-31 PROCEDURE — 50200 RENAL BIOPSY PERQ: CPT | Performed by: RADIOLOGY

## 2022-08-31 PROCEDURE — 99152 PR MOD CONSCIOUS SEDATION, SAME PHYS, 5+ YRS, FIRST 15 MIN: ICD-10-PCS | Mod: ,,, | Performed by: RADIOLOGY

## 2022-08-31 PROCEDURE — 76942 ECHO GUIDE FOR BIOPSY: CPT | Mod: TC | Performed by: RADIOLOGY

## 2022-08-31 PROCEDURE — 85025 COMPLETE CBC W/AUTO DIFF WBC: CPT | Performed by: INTERNAL MEDICINE

## 2022-08-31 PROCEDURE — 99152 MOD SED SAME PHYS/QHP 5/>YRS: CPT | Mod: ,,, | Performed by: RADIOLOGY

## 2022-08-31 PROCEDURE — 80053 COMPREHEN METABOLIC PANEL: CPT | Performed by: INTERNAL MEDICINE

## 2022-08-31 PROCEDURE — 27201068 IR BIOPSY KIDNEY

## 2022-08-31 PROCEDURE — 99152 MOD SED SAME PHYS/QHP 5/>YRS: CPT | Performed by: RADIOLOGY

## 2022-08-31 PROCEDURE — 63600175 PHARM REV CODE 636 W HCPCS: Performed by: RADIOLOGY

## 2022-08-31 PROCEDURE — 99153 MOD SED SAME PHYS/QHP EA: CPT

## 2022-08-31 PROCEDURE — 50200 IR BIOPSY KIDNEY: ICD-10-PCS | Mod: LT,,, | Performed by: RADIOLOGY

## 2022-08-31 PROCEDURE — 99153 MOD SED SAME PHYS/QHP EA: CPT | Performed by: RADIOLOGY

## 2022-08-31 PROCEDURE — 99152 MOD SED SAME PHYS/QHP 5/>YRS: CPT

## 2022-08-31 PROCEDURE — 76942 PR U/S GUIDANCE FOR NEEDLE GUIDANCE: ICD-10-PCS | Mod: 26,,, | Performed by: RADIOLOGY

## 2022-08-31 PROCEDURE — 76942 ECHO GUIDE FOR BIOPSY: CPT | Mod: 26,,, | Performed by: RADIOLOGY

## 2022-08-31 PROCEDURE — 85610 PROTHROMBIN TIME: CPT | Performed by: INTERNAL MEDICINE

## 2022-08-31 PROCEDURE — 25000003 PHARM REV CODE 250: Performed by: RADIOLOGY

## 2022-08-31 RX ORDER — MIDAZOLAM HYDROCHLORIDE 1 MG/ML
INJECTION INTRAMUSCULAR; INTRAVENOUS
Status: DISCONTINUED | OUTPATIENT
Start: 2022-08-31 | End: 2022-09-01 | Stop reason: HOSPADM

## 2022-08-31 RX ORDER — HYDRALAZINE HYDROCHLORIDE 100 MG/1
100 TABLET, FILM COATED ORAL 2 TIMES DAILY
COMMUNITY
End: 2023-01-10 | Stop reason: SDUPTHER

## 2022-08-31 RX ORDER — LIDOCAINE HYDROCHLORIDE 10 MG/ML
INJECTION INFILTRATION; PERINEURAL
Status: DISCONTINUED | OUTPATIENT
Start: 2022-08-31 | End: 2022-09-01 | Stop reason: HOSPADM

## 2022-08-31 RX ORDER — HYDRALAZINE HYDROCHLORIDE 20 MG/ML
10 INJECTION INTRAMUSCULAR; INTRAVENOUS ONCE
Status: COMPLETED | OUTPATIENT
Start: 2022-08-31 | End: 2022-08-31

## 2022-08-31 RX ORDER — FENTANYL CITRATE 50 UG/ML
INJECTION, SOLUTION INTRAMUSCULAR; INTRAVENOUS
Status: DISCONTINUED | OUTPATIENT
Start: 2022-08-31 | End: 2022-09-01 | Stop reason: HOSPADM

## 2022-08-31 RX ORDER — SODIUM CHLORIDE 9 MG/ML
INJECTION, SOLUTION INTRAVENOUS
Status: DISCONTINUED | OUTPATIENT
Start: 2022-08-31 | End: 2022-09-01 | Stop reason: HOSPADM

## 2022-08-31 RX ADMIN — SODIUM CHLORIDE 500 ML: 0.9 INJECTION, SOLUTION INTRAVENOUS at 09:08

## 2022-08-31 RX ADMIN — LIDOCAINE HYDROCHLORIDE 5 ML: 10 INJECTION, SOLUTION INFILTRATION; PERINEURAL at 09:08

## 2022-08-31 RX ADMIN — MIDAZOLAM HYDROCHLORIDE 0.5 MG: 1 INJECTION, SOLUTION INTRAMUSCULAR; INTRAVENOUS at 09:08

## 2022-08-31 RX ADMIN — GELATIN ABSORBABLE SPONGE SIZE 100 1 APPLICATOR: MISC at 10:08

## 2022-08-31 RX ADMIN — FENTANYL CITRATE 25 MCG: 50 INJECTION, SOLUTION INTRAMUSCULAR; INTRAVENOUS at 09:08

## 2022-08-31 RX ADMIN — HYDRALAZINE HYDROCHLORIDE 10 MG: 20 INJECTION, SOLUTION INTRAMUSCULAR; INTRAVENOUS at 11:08

## 2022-08-31 NOTE — PLAN OF CARE
Patient admitted and checked in to IR for kidney biopsy. PIV started and labs collected and brought to lab. Patient vitals and assessment stable. Will continue to monitor.

## 2022-08-31 NOTE — PROCEDURES
Interventional Radiology Immediate Post-Procedure Note    Pre-Op Diagnosis: CKD  Post-Op Diagnosis: Same    Procedure: US-guided coaxial core needle bx    Procedure performed by: Braden Mc MD  Assistants: None    Estimated Blood Loss: Minimal  Specimen Removed: Yes    Findings/description of procedure:  18-ga cores x3 taken from upper pole of LEFT kidney. Tract embolized with Gel-Foam Sponge torpedoes.    No immediate complications. Patient tolerated procedure well. Please see full dictated procedure report for additional details and recommendations.      Braden Mc MD  [facility]  Pager [phone]

## 2022-08-31 NOTE — H&P
Interventional Radiology Pre-Procedure History & Physical      Chief Complaint/Reason for Referral: CKD    History of Present Illness:  Myesha Quinones is a 82 y.o. female who presents with CKD. Referred for perc native kidney bx.    Past Medical History:   Diagnosis Date    Acute hypoxemic respiratory failure 12/19/2019    Acute right-sided thoracic back pain 12/09/2019    Allergy     Asthma     Basal cell carcinoma     left forehead    Basal cell carcinoma     left nose    Basal cell carcinoma 05/20/2015    right nose    Basal cell carcinoma 12/22/2015    left lower post neck    Basal cell carcinoma 12/03/2019    left ant scalp     Bilateral renal cysts     Breast cancer     CAD (coronary artery disease)     Cardiomyopathy     Cardiomyopathy, ischemic     Cataract     CHF (congestive heart failure)     CKD (chronic kidney disease) stage 4, GFR 15-29 ml/min     Colon polyp 2011    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, with long-term current use of insulin 02/22/2018    COPD (chronic obstructive pulmonary disease)     COPD exacerbation 04/08/2018    Current mild episode of major depressive disorder without prior episode 01/25/2022    Defibrillator discharge     Diabetes mellitus     Diabetes mellitus type II     Diabetes with neurologic complications     Edema     Goiter     MNG    Hematuria, unspecified     HX: breast cancer     Hyperlipidemia     Hypertension     Hypocalcemia     Hypokalemia     Hyponatremia     Hyponatremia 04/02/2022    Iron deficiency anemia 05/16/2017    Iron deficiency anemia     Iron deficiency anemia     Left kidney mass     Meningioma     Microalbuminuria due to type 2 diabetes mellitus 01/26/2022    Osteoporosis, postmenopausal     Pneumonia 12/08/2019    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    Proteinuria 01/21/2019    Pseudomonas pneumonia     Skin cancer     s/p excision    Sleep apnea     CPAP     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/2015    mid forehead    Unspecified vitamin D deficiency     UTI (urinary tract infection) 04/02/2022    Ventricular tachycardia     Vitamin B12 deficiency     Vitamin D deficiency disease      Past Surgical History:   Procedure Laterality Date    BASAL CELL CARCINOMA EXCISION      posterior neck and nose    BREAST BIOPSY      BREAST CYST EXCISION Left     BREAST SURGERY      CARDIAC DEFIBRILLATOR PLACEMENT      x 2    CATARACT EXTRACTION W/  INTRAOCULAR LENS IMPLANT Bilateral     CHOLECYSTECTOMY      COLONOSCOPY N/A 11/5/2019    Procedure: COLONOSCOPY;  Surgeon: Boaz Botello MD;  Location: [facility] ENDO ([address] [place] [address]);  Service: Endoscopy;  Laterality: N/A;  AICD - [employer]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    FRACTURE SURGERY      left elbow and wrist as a child    HYSTERECTOMY      MASTECTOMY Right     REPLACEMENT OF IMPLANTABLE CARDIOVERTER-DEFIBRILLATOR (ICD) GENERATOR N/A 12/17/2018    Procedure: REPLACEMENT, ICD GENERATOR;  Surgeon: Jan Mckeon MD;  Location: [facility] EP LAB;  Service: Cardiology;  Laterality: N/A;  DONNA, CRT-D gen change, MDT, MAC, SK, 3 Prep    REVISION OF SKIN POCKET FOR CARDIOVERTER-DEFIBRILLATOR  12/17/2018    Procedure: Revision, Skin Pocket, For Cardioverter-Defibrillator;  Surgeon: Jan Mckeon MD;  Location: [facility] EP LAB;  Service: Cardiology;;    SQUAMOUS CELL CARCINOMA EXCISION      remved from forehead    TONSILLECTOMY         Allergies:   Review of patient's allergies indicates:   Allergen Reactions    Iodine and iodide containing products Hives and Other (See Comments)     Not specified     Nifedipine      weakness        Home Meds:   Prior to Admission medications    Medication Sig Start Date End Date Taking? Authorizing Provider   acetaminophen (TYLENOL) 500 MG tablet Take 500 mg by mouth as needed.   Yes Historical Provider   albuterol (PROVENTIL/VENTOLIN HFA) 90 mcg/actuation inhaler INHALE 2 PUFFS INTO THE LUNGS EVERY 6 (SIX) HOURS  "AS NEEDED FOR WHEEZING. RESCUE 10/18/21  Yes Emelina Gaston MD   albuterol-ipratropium (DUO-NEB) 2.5 mg-0.5 mg/3 mL nebulizer solution TAKE 3 MLS BY NEBULIZATION EVERY 4 (FOUR) HOURS AS NEEDED FOR WHEEZING. 12/9/21  Yes Emelina Gaston MD   blood sugar diagnostic (ACCU-CHEK ANGELICA) Strp Uses Accu-Check Angelica meter to test BG 4x/day 6/6/22  Yes Elidia Madison NP   carvediloL (COREG) 25 MG tablet TAKE 2 TABLETS (50 MG TOTAL) BY MOUTH 2 (TWO) TIMES DAILY. 3/9/22  Yes Li Lott PA-C   cholecalciferol, vitamin D3, (VITAMIN D3) 50 mcg (2,000 unit) Tab Take 1 tablet by mouth once daily.    Yes Historical Provider   cyanocobalamin (VITAMIN B-12) 100 MCG tablet Take 100 mcg by mouth once daily.   Yes Historical Provider   diltiaZEM (CARDIZEM CD) 240 MG 24 hr capsule as needed. 7/9/22  Yes Historical Provider   fluticasone propionate (FLONASE) 50 mcg/actuation nasal spray 2 sprays (100 mcg total) by Each Nostril route once daily. 2/24/21  Yes Brittany Metcalf MD   furosemide (LASIX) 40 MG tablet Take 1 tablet (40 mg total) by mouth once daily. 7/14/22  Yes Anamika Galeas MD   hydrALAZINE (APRESOLINE) 100 MG tablet Take 100 mg by mouth 2 (two) times daily.   Yes Historical Provider   lancets (ACCU-CHEK SOFTCLIX LANCETS) Misc Uses Accu-Chek Angelica meter to test BG 1x/day 6/6/22  Yes Elidia Madison NP   linaGLIPtin (TRADJENTA) 5 mg Tab tablet Take 1 tablet (5 mg total) by mouth once daily. 6/6/22 6/6/23 Yes Elidia Madison NP   magnesium oxide (MAG-OX) 400 mg tablet Take 1 tablet (400 mg total) by mouth once daily.  Patient taking differently: Take 400 mg by mouth 3 (three) times daily. 12/8/17  Yes Emelina Gaston MD   montelukast (SINGULAIR) 10 mg tablet Take 1 tablet (10 mg total) by mouth every evening.  Patient taking differently: Take 10 mg by mouth once daily. 1/25/22  Yes Catrachita Rothman MD   pen needle, diabetic (BD ULTRA-FINE MINI PEN NEEDLE) 31 gauge x 3/16" Ndle USE WITH LANTUS AT BEDTIME 12/14/20  " Yes Emelina Gaston MD   pravastatin (PRAVACHOL) 40 MG tablet TAKE 1 TABLET BY MOUTH EVERY DAY  Patient taking differently: Take 40 mg by mouth once daily. 5/28/22  Yes Saran Bain MD   pulse oximeter (PULSE OXIMETER) device by Apply Externally route 2 (two) times a day. Use twice daily at 8 AM and 3 PM and record the value in MyChart as directed. 12/12/20  Yes Rodrigo Schmidt MD   sodium bicarbonate 650 MG tablet Take 2 tablets (1,300 mg total) by mouth 3 (three) times daily. 4/26/22 4/26/23 Yes Catrachita Rothman MD   aspirin 81 MG Chew Take 81 mg by mouth once daily.    Historical Provider   AVASTIN 25 mg/mL injection  5/23/22   Historical Provider   benzonatate (TESSALON) 100 MG capsule Take 1 capsule (100 mg total) by mouth 3 (three) times daily as needed for Cough. 7/8/22   Dayton Michael MD   estradioL (ESTRACE) 0.01 % (0.1 mg/gram) vaginal cream Place 1 g vaginally every Mon, Wed, Fri. 5/9/22 5/9/23  Alex Strauss MD   fluticasone-salmeterol diskus inhaler 250-50 mcg Inhale 1 puff into the lungs as needed. Controller    Historical Provider   furosemide (LASIX) 20 MG tablet  4/30/22   Historical Provider   insulin (LANTUS SOLOSTAR U-100 INSULIN) glargine 100 units/mL SubQ pen Inject 10 Units into the skin 2 (two) times daily.  Patient taking differently: Inject 11 Units into the skin 2 (two) times daily. 7/25/22   Elidia Madison NP   nitroGLYCERIN (NITROSTAT) 0.4 MG SL tablet Place 0.4 mg under the tongue every 5 (five) minutes as needed for Chest pain.  5/29/12   Historical Provider   omega-3 fatty acids 500 mg Cap Take 1 capsule by mouth Daily. 5/29/12   Historical Provider   polyethylene glycol (GLYCOLAX) 17 gram PwPk Take 17 g by mouth daily as needed (constipation). 3/1/22   Jolynn Kemp PA-C   promethazine (PHENERGAN) 12.5 MG Tab Take 1 tablet (12.5 mg total) by mouth every 6 (six) hours as needed (nausea). 7/21/22   Dayton Michael MD       Anticoagulation/Antiplatelet Meds: Held >7  days    Review of Systems:   Hematological: no known coagulopathies  Respiratory: no shortness of breath  Cardiovascular: no chest pain  Gastrointestinal: no abdominal pain  Genitourinary: no dysuria  Musculoskeletal: negative  Neurological: no TIA or stroke symptoms     Physical Exam:  Temp: 98 °F (36.7 °C) (08/31/22 0749)  Pulse: 71 (08/31/22 0749)  Resp: 16 (08/31/22 0749)  BP: (!) 179/74 (08/31/22 0749)  SpO2: 98 % (08/31/22 0749)    General: WNWD, NAD  HEENT: Normocephalic, sclera anicteric, oropharynx clear  Heart: RRR  Lungs: Symmetric excursions, breathing unlabored  Abd: NTND, soft  Extremities: MERCADO  Neuro: Gross nonfocal    Laboratory:  Lab Results   Component Value Date    INR 1.1 06/21/2022       Lab Results   Component Value Date    WBC 10.71 07/15/2022    HGB 8.3 (L) 07/15/2022    HCT 25.8 (L) 07/15/2022    MCV 89 07/15/2022     07/15/2022      Lab Results   Component Value Date    GLU 82 08/06/2022     08/06/2022    K 4.4 08/06/2022    CL 99 08/06/2022    CO2 29 08/06/2022    BUN 57 (H) 08/06/2022    CREATININE 2.7 (H) 08/06/2022    CALCIUM 8.7 08/06/2022    MG 3.2 (H) 07/15/2022    ALT 16 07/15/2022    AST 15 07/15/2022    ALBUMIN 3.2 (L) 08/06/2022    BILITOT 0.3 07/15/2022    BILIDIR 0.2 04/04/2022       Imaging:  Renal US2/24/22 reviewed.    Assessment/Plan:  82 y.o. female with CKD. Will undergo native kidney bx today.    Sedation:  Sedation history: have not been any systemic reactions  ASA: 3 / Mallampati: 3  Sedation plan: Up to moderate (Versed, fentanyl)     Risks (including, but not limited to, pain, bleeding, infection, damage to nearby structures, treatment failure/recurrence, the need for additional procedures, death), potential benefits, and alternatives were discussed with the patient. All questions were answered to the best of my abilities. The patient wishes to proceed. Written informed consent was obtained.      Braden Mc MD  [facility]  Pager [phone]

## 2022-08-31 NOTE — SEDATION DOCUMENTATION
IR procedure - Random kidney biopsy - is completed. Patient tolerated well. Vital signs are stable at this time. She is awake, alert, and oriented. Specimen is collected and taken to lab by charge nurse, Pauly. [facility]; the appropriate documents are taken to lab signifying this. Patient will return to IR pre/post to complete the prescribed recovery time of six hours.

## 2022-08-31 NOTE — NURSING
Patient's daughter Betsy is called with an update. She is advised her mom is doing well, is currently awake, alert, and oriented with stable vital signs, and had just consumed some diet cranberry juice.

## 2022-09-01 ENCOUNTER — OFFICE VISIT (OUTPATIENT)
Dept: DERMATOLOGY | Facility: CLINIC | Age: 82
End: 2022-09-01
Payer: MEDICARE

## 2022-09-01 DIAGNOSIS — Z09 POSTOP CHECK: Primary | ICD-10-CM

## 2022-09-01 PROCEDURE — 1101F PR PT FALLS ASSESS DOC 0-1 FALLS W/OUT INJ PAST YR: ICD-10-PCS | Mod: CPTII,S$GLB,, | Performed by: DERMATOLOGY

## 2022-09-01 PROCEDURE — 99999 PR PBB SHADOW E&M-EST. PATIENT-LVL III: CPT | Mod: PBBFAC,,, | Performed by: DERMATOLOGY

## 2022-09-01 PROCEDURE — 1159F MED LIST DOCD IN RCRD: CPT | Mod: CPTII,S$GLB,, | Performed by: DERMATOLOGY

## 2022-09-01 PROCEDURE — 1101F PT FALLS ASSESS-DOCD LE1/YR: CPT | Mod: CPTII,S$GLB,, | Performed by: DERMATOLOGY

## 2022-09-01 PROCEDURE — 1126F PR PAIN SEVERITY QUANTIFIED, NO PAIN PRESENT: ICD-10-PCS | Mod: CPTII,S$GLB,, | Performed by: DERMATOLOGY

## 2022-09-01 PROCEDURE — 1160F RVW MEDS BY RX/DR IN RCRD: CPT | Mod: CPTII,S$GLB,, | Performed by: DERMATOLOGY

## 2022-09-01 PROCEDURE — 3288F FALL RISK ASSESSMENT DOCD: CPT | Mod: CPTII,S$GLB,, | Performed by: DERMATOLOGY

## 2022-09-01 PROCEDURE — 1160F PR REVIEW ALL MEDS BY PRESCRIBER/CLIN PHARMACIST DOCUMENTED: ICD-10-PCS | Mod: CPTII,S$GLB,, | Performed by: DERMATOLOGY

## 2022-09-01 PROCEDURE — 1126F AMNT PAIN NOTED NONE PRSNT: CPT | Mod: CPTII,S$GLB,, | Performed by: DERMATOLOGY

## 2022-09-01 PROCEDURE — 3288F PR FALLS RISK ASSESSMENT DOCUMENTED: ICD-10-PCS | Mod: CPTII,S$GLB,, | Performed by: DERMATOLOGY

## 2022-09-01 PROCEDURE — 99024 PR POST-OP FOLLOW-UP VISIT: ICD-10-PCS | Mod: S$GLB,,, | Performed by: DERMATOLOGY

## 2022-09-01 PROCEDURE — 99024 POSTOP FOLLOW-UP VISIT: CPT | Mod: S$GLB,,, | Performed by: DERMATOLOGY

## 2022-09-01 PROCEDURE — 1159F PR MEDICATION LIST DOCUMENTED IN MEDICAL RECORD: ICD-10-PCS | Mod: CPTII,S$GLB,, | Performed by: DERMATOLOGY

## 2022-09-01 PROCEDURE — 99999 PR PBB SHADOW E&M-EST. PATIENT-LVL III: ICD-10-PCS | Mod: PBBFAC,,, | Performed by: DERMATOLOGY

## 2022-09-01 NOTE — PROGRESS NOTES
82 y.o. female patient is here for suture removal following Mohs' surgery.    Patient reports no problems.    WOUND PE:  The R neck sutures intact. Wound healing well. Good skin edges. No signs or symptoms of infection.    IMPRESSION:  Healing operative site from Mohs' surgery, SCC R neck s/p Mohs with CLC, postop day #7.    PLAN:  Sutures removed today by  Mimi Nguyen MA .   Steri-strips applied.  Keep moist with Aquaphor.    RTC:  In 1 month for Mohs procedure, BCC R alar groove.

## 2022-09-02 LAB
ACID FAST MOD KINY STN SPEC: ABNORMAL
MYCOBACTERIUM SPEC QL CULT: ABNORMAL
MYCOBACTERIUM SPEC QL CULT: ABNORMAL

## 2022-09-06 ENCOUNTER — OFFICE VISIT (OUTPATIENT)
Dept: CARDIOLOGY | Facility: CLINIC | Age: 82
End: 2022-09-06
Payer: MEDICARE

## 2022-09-06 ENCOUNTER — PATIENT MESSAGE (OUTPATIENT)
Dept: ENDOCRINOLOGY | Facility: CLINIC | Age: 82
End: 2022-09-06
Payer: MEDICARE

## 2022-09-06 ENCOUNTER — TELEPHONE (OUTPATIENT)
Dept: ENDOCRINOLOGY | Facility: CLINIC | Age: 82
End: 2022-09-06
Payer: MEDICARE

## 2022-09-06 VITALS
SYSTOLIC BLOOD PRESSURE: 125 MMHG | HEIGHT: 62 IN | RESPIRATION RATE: 20 BRPM | HEART RATE: 69 BPM | WEIGHT: 132 LBS | DIASTOLIC BLOOD PRESSURE: 52 MMHG | BODY MASS INDEX: 24.29 KG/M2

## 2022-09-06 DIAGNOSIS — E11.69 HYPERLIPIDEMIA ASSOCIATED WITH TYPE 2 DIABETES MELLITUS: ICD-10-CM

## 2022-09-06 DIAGNOSIS — E78.5 HYPERLIPIDEMIA ASSOCIATED WITH TYPE 2 DIABETES MELLITUS: ICD-10-CM

## 2022-09-06 DIAGNOSIS — I50.42 CHRONIC COMBINED SYSTOLIC AND DIASTOLIC HEART FAILURE: ICD-10-CM

## 2022-09-06 DIAGNOSIS — I10 ESSENTIAL HYPERTENSION: ICD-10-CM

## 2022-09-06 PROCEDURE — 3288F PR FALLS RISK ASSESSMENT DOCUMENTED: ICD-10-PCS | Mod: CPTII,S$GLB,, | Performed by: INTERNAL MEDICINE

## 2022-09-06 PROCEDURE — 1101F PR PT FALLS ASSESS DOC 0-1 FALLS W/OUT INJ PAST YR: ICD-10-PCS | Mod: CPTII,S$GLB,, | Performed by: INTERNAL MEDICINE

## 2022-09-06 PROCEDURE — 1159F MED LIST DOCD IN RCRD: CPT | Mod: CPTII,S$GLB,, | Performed by: INTERNAL MEDICINE

## 2022-09-06 PROCEDURE — 1126F PR PAIN SEVERITY QUANTIFIED, NO PAIN PRESENT: ICD-10-PCS | Mod: CPTII,S$GLB,, | Performed by: INTERNAL MEDICINE

## 2022-09-06 PROCEDURE — 3078F DIAST BP <80 MM HG: CPT | Mod: CPTII,S$GLB,, | Performed by: INTERNAL MEDICINE

## 2022-09-06 PROCEDURE — 1160F PR REVIEW ALL MEDS BY PRESCRIBER/CLIN PHARMACIST DOCUMENTED: ICD-10-PCS | Mod: CPTII,S$GLB,, | Performed by: INTERNAL MEDICINE

## 2022-09-06 PROCEDURE — 3074F PR MOST RECENT SYSTOLIC BLOOD PRESSURE < 130 MM HG: ICD-10-PCS | Mod: CPTII,S$GLB,, | Performed by: INTERNAL MEDICINE

## 2022-09-06 PROCEDURE — 1101F PT FALLS ASSESS-DOCD LE1/YR: CPT | Mod: CPTII,S$GLB,, | Performed by: INTERNAL MEDICINE

## 2022-09-06 PROCEDURE — 99499 UNLISTED E&M SERVICE: CPT | Mod: S$PBB,,, | Performed by: INTERNAL MEDICINE

## 2022-09-06 PROCEDURE — 1126F AMNT PAIN NOTED NONE PRSNT: CPT | Mod: CPTII,S$GLB,, | Performed by: INTERNAL MEDICINE

## 2022-09-06 PROCEDURE — 99999 PR PBB SHADOW E&M-EST. PATIENT-LVL V: CPT | Mod: PBBFAC,,, | Performed by: INTERNAL MEDICINE

## 2022-09-06 PROCEDURE — 1160F RVW MEDS BY RX/DR IN RCRD: CPT | Mod: CPTII,S$GLB,, | Performed by: INTERNAL MEDICINE

## 2022-09-06 PROCEDURE — 1159F PR MEDICATION LIST DOCUMENTED IN MEDICAL RECORD: ICD-10-PCS | Mod: CPTII,S$GLB,, | Performed by: INTERNAL MEDICINE

## 2022-09-06 PROCEDURE — 99499 RISK ADDL DX/OHS AUDIT: ICD-10-PCS | Mod: S$PBB,,, | Performed by: INTERNAL MEDICINE

## 2022-09-06 PROCEDURE — 99214 OFFICE O/P EST MOD 30 MIN: CPT | Mod: S$GLB,,, | Performed by: INTERNAL MEDICINE

## 2022-09-06 PROCEDURE — 99999 PR PBB SHADOW E&M-EST. PATIENT-LVL V: ICD-10-PCS | Mod: PBBFAC,,, | Performed by: INTERNAL MEDICINE

## 2022-09-06 PROCEDURE — 3078F PR MOST RECENT DIASTOLIC BLOOD PRESSURE < 80 MM HG: ICD-10-PCS | Mod: CPTII,S$GLB,, | Performed by: INTERNAL MEDICINE

## 2022-09-06 PROCEDURE — 3074F SYST BP LT 130 MM HG: CPT | Mod: CPTII,S$GLB,, | Performed by: INTERNAL MEDICINE

## 2022-09-06 PROCEDURE — 99214 PR OFFICE/OUTPT VISIT, EST, LEVL IV, 30-39 MIN: ICD-10-PCS | Mod: S$GLB,,, | Performed by: INTERNAL MEDICINE

## 2022-09-06 PROCEDURE — 3288F FALL RISK ASSESSMENT DOCD: CPT | Mod: CPTII,S$GLB,, | Performed by: INTERNAL MEDICINE

## 2022-09-06 NOTE — ASSESSMENT & PLAN NOTE
The patient has HFpEF in the setting of CKD stage 4. Renal failure is a large contribution. EF preserved with controlled Bps. Defer to nephrology in titration of furosemide. Anticipate that dialysis may be necessary at some time for better volume control.

## 2022-09-06 NOTE — PROGRESS NOTES
HPI: 81 yo F with a h/o NICMP with recovery, HFpEF, s/p BiV-AICD '04, CKD4, IDDM, hypertension, hyperlipidemia, COPD, KHOA s/p CPAP, and APBA presenting for initial evaluation by me.    The patient reports intermittent issues with volume overload. She has stage 4 CKD. S/p recent biopsy. She is on furosemide 40x1 most days and will occasionally take a second dose if she notes pitting edema or significant weight gain. She tries to keep her weight in the low 130s. No chest pain or SOB rest. Chronic stable PELAYO. Mild le edema. No orthopnea.     She also tolerates carvedilol 50 x 2, hydralazine 100 x 2, valsartan 40x2, furosemide 40x1, and pravastatin 40x1. SBPs 120-140s with Hrs in the 70-80s.    PHYSICAL EXAM:  Vitals:    09/06/22 1335   BP: (!) 125/52   Pulse: 69   Resp: 20       Physical Exam  Constitutional:       Appearance: Normal appearance.   Neck:      Vascular: No carotid bruit or JVD.   Cardiovascular:      Rate and Rhythm: Normal rate and regular rhythm.      Pulses:           Radial pulses are 2+ on the right side and 2+ on the left side.        Dorsalis pedis pulses are 1+ on the right side and 1+ on the left side.      Heart sounds: S1 normal and S2 normal. No murmur heard.    No S3 sounds.      Comments: Mild edema.   Pulmonary:      Effort: Pulmonary effort is normal.      Breath sounds: Decreased air movement present. No rales.   Neurological:      Mental Status: She is alert.       LABS/CARDIAC TESTS:  August 2022 hemoglobin 8.9 with MCV of 94. Creatinine 2.6 with BUN of 49. Albumin 3.2.  July . LDL 29 HDL 39.  ECG 2022 demonstrates AV pacing.  Device check July 2022 shows 100% bi V pacing.  No arrhythmias.  TTE June 2022 demonstrates normal LV size with an EF of 55%.  Grade 2 diastology. Nml RV size and fxn.  No significant valve disease.  CVP 8. PASP 56.   Nuclear stress test 2016 no evidence of ischemia.  Renal duplex 2017 no evidence of renal artery stenosis bilaterally.  PFTs 2020 Severe  obstruction and moderately decreased DLCO.    ASSESSMENT & PLAN:    Chronic combined systolic and diastolic heart failure  The patient has HFpEF in the setting of CKD stage 4. Renal failure is a large contribution. EF preserved with controlled Bps. Defer to nephrology in titration of furosemide. Anticipate that dialysis may be necessary at some time for better volume control.      Essential hypertension  Patient with extensive home log that demonstrates reasonable Bps. Cont carvedilol 50x2, valsartan 40x2, and hydralazine 100x2.        Hyperlipidemia associated with type 2 diabetes mellitus  LDL at goal on pravastatin.      Chronic combined systolic and diastolic heart failure    Essential hypertension    Hyperlipidemia associated with type 2 diabetes mellitus        Seng Salgado MD

## 2022-09-06 NOTE — ASSESSMENT & PLAN NOTE
Patient with extensive home log that demonstrates reasonable Bps. Cont carvedilol 50x2, valsartan 40x2, and hydralazine 100x2.

## 2022-09-08 LAB
FINAL PATHOLOGIC DIAGNOSIS: NORMAL
GROSS: NORMAL
Lab: NORMAL

## 2022-09-16 ENCOUNTER — LAB VISIT (OUTPATIENT)
Dept: LAB | Facility: HOSPITAL | Age: 82
End: 2022-09-16
Attending: INTERNAL MEDICINE
Payer: MEDICARE

## 2022-09-16 DIAGNOSIS — N39.0 UTI (URINARY TRACT INFECTION), UNCOMPLICATED: ICD-10-CM

## 2022-09-16 DIAGNOSIS — R80.1 PERSISTENT PROTEINURIA: ICD-10-CM

## 2022-09-16 DIAGNOSIS — N18.4 ANEMIA IN STAGE 4 CHRONIC KIDNEY DISEASE: ICD-10-CM

## 2022-09-16 DIAGNOSIS — D63.1 ANEMIA IN STAGE 4 CHRONIC KIDNEY DISEASE: ICD-10-CM

## 2022-09-16 DIAGNOSIS — N18.4 CHRONIC KIDNEY DISEASE, STAGE IV (SEVERE): ICD-10-CM

## 2022-09-16 DIAGNOSIS — E55.9 VITAMIN D DEFICIENCY: ICD-10-CM

## 2022-09-16 DIAGNOSIS — N25.81 SECONDARY HYPERPARATHYROIDISM OF RENAL ORIGIN: ICD-10-CM

## 2022-09-16 LAB
25(OH)D3+25(OH)D2 SERPL-MCNC: 32 NG/ML (ref 30–96)
ALBUMIN SERPL BCP-MCNC: 3.3 G/DL (ref 3.5–5.2)
ANION GAP SERPL CALC-SCNC: 10 MMOL/L (ref 8–16)
BASOPHILS # BLD AUTO: 0.05 K/UL (ref 0–0.2)
BASOPHILS NFR BLD: 0.6 % (ref 0–1.9)
BUN SERPL-MCNC: 54 MG/DL (ref 8–23)
CALCIUM SERPL-MCNC: 8.6 MG/DL (ref 8.7–10.5)
CHLORIDE SERPL-SCNC: 99 MMOL/L (ref 95–110)
CO2 SERPL-SCNC: 27 MMOL/L (ref 23–29)
CREAT SERPL-MCNC: 2.5 MG/DL (ref 0.5–1.4)
DIFFERENTIAL METHOD: ABNORMAL
EOSINOPHIL # BLD AUTO: 0.3 K/UL (ref 0–0.5)
EOSINOPHIL NFR BLD: 2.9 % (ref 0–8)
ERYTHROCYTE [DISTWIDTH] IN BLOOD BY AUTOMATED COUNT: 14 % (ref 11.5–14.5)
EST. GFR  (NO RACE VARIABLE): 18.6 ML/MIN/1.73 M^2
FERRITIN SERPL-MCNC: 243 NG/ML (ref 20–300)
GLUCOSE SERPL-MCNC: 138 MG/DL (ref 70–110)
HCT VFR BLD AUTO: 29 % (ref 37–48.5)
HGB BLD-MCNC: 9.1 G/DL (ref 12–16)
IMM GRANULOCYTES # BLD AUTO: 0.04 K/UL (ref 0–0.04)
IMM GRANULOCYTES NFR BLD AUTO: 0.4 % (ref 0–0.5)
IRON SERPL-MCNC: 41 UG/DL (ref 30–160)
LYMPHOCYTES # BLD AUTO: 1.3 K/UL (ref 1–4.8)
LYMPHOCYTES NFR BLD: 14.1 % (ref 18–48)
MCH RBC QN AUTO: 30.8 PG (ref 27–31)
MCHC RBC AUTO-ENTMCNC: 31.4 G/DL (ref 32–36)
MCV RBC AUTO: 98 FL (ref 82–98)
MONOCYTES # BLD AUTO: 0.6 K/UL (ref 0.3–1)
MONOCYTES NFR BLD: 6.7 % (ref 4–15)
NEUTROPHILS # BLD AUTO: 6.8 K/UL (ref 1.8–7.7)
NEUTROPHILS NFR BLD: 75.3 % (ref 38–73)
NRBC BLD-RTO: 0 /100 WBC
PHOSPHATE SERPL-MCNC: 4.4 MG/DL (ref 2.7–4.5)
PLATELET # BLD AUTO: 240 K/UL (ref 150–450)
PMV BLD AUTO: 10.7 FL (ref 9.2–12.9)
POTASSIUM SERPL-SCNC: 4.3 MMOL/L (ref 3.5–5.1)
PTH-INTACT SERPL-MCNC: 262.5 PG/ML (ref 9–77)
RBC # BLD AUTO: 2.95 M/UL (ref 4–5.4)
SATURATED IRON: 13 % (ref 20–50)
SODIUM SERPL-SCNC: 136 MMOL/L (ref 136–145)
TOTAL IRON BINDING CAPACITY: 318 UG/DL (ref 250–450)
TRANSFERRIN SERPL-MCNC: 215 MG/DL (ref 200–375)
URATE SERPL-MCNC: 8.1 MG/DL (ref 2.4–5.7)
WBC # BLD AUTO: 9.05 K/UL (ref 3.9–12.7)

## 2022-09-16 PROCEDURE — 85025 COMPLETE CBC W/AUTO DIFF WBC: CPT | Performed by: INTERNAL MEDICINE

## 2022-09-16 PROCEDURE — 82728 ASSAY OF FERRITIN: CPT | Performed by: INTERNAL MEDICINE

## 2022-09-16 PROCEDURE — 84550 ASSAY OF BLOOD/URIC ACID: CPT | Performed by: INTERNAL MEDICINE

## 2022-09-16 PROCEDURE — 36415 COLL VENOUS BLD VENIPUNCTURE: CPT | Mod: PO | Performed by: INTERNAL MEDICINE

## 2022-09-16 PROCEDURE — 80069 RENAL FUNCTION PANEL: CPT | Performed by: INTERNAL MEDICINE

## 2022-09-16 PROCEDURE — 83970 ASSAY OF PARATHORMONE: CPT | Performed by: INTERNAL MEDICINE

## 2022-09-16 PROCEDURE — 84466 ASSAY OF TRANSFERRIN: CPT | Performed by: INTERNAL MEDICINE

## 2022-09-16 PROCEDURE — 82306 VITAMIN D 25 HYDROXY: CPT | Performed by: INTERNAL MEDICINE

## 2022-09-20 ENCOUNTER — CLINICAL SUPPORT (OUTPATIENT)
Dept: ENDOCRINOLOGY | Facility: CLINIC | Age: 82
End: 2022-09-20
Payer: MEDICARE

## 2022-09-20 DIAGNOSIS — Z79.4 CONTROLLED TYPE 2 DIABETES MELLITUS WITH BOTH EYES AFFECTED BY MILD NONPROLIFERATIVE RETINOPATHY AND MACULAR EDEMA, WITH LONG-TERM CURRENT USE OF INSULIN: ICD-10-CM

## 2022-09-20 DIAGNOSIS — Z86.010 HISTORY OF COLON POLYPS: Primary | ICD-10-CM

## 2022-09-20 DIAGNOSIS — E11.3213 CONTROLLED TYPE 2 DIABETES MELLITUS WITH BOTH EYES AFFECTED BY MILD NONPROLIFERATIVE RETINOPATHY AND MACULAR EDEMA, WITH LONG-TERM CURRENT USE OF INSULIN: ICD-10-CM

## 2022-09-20 NOTE — PROGRESS NOTES
"PLACEMENT OF DEXCOM G6 PRO SENSOR  CONTINOUS GLUCOSE MONITORING SYSTEM (CGMS)     Patient is here in clinic today for placement of continuous glucose monitoring sensor.                Each patient verified that they were here for CGMS procedure ordered by their provider and that they have a working glucose meter and supplies at home.   Patient will be provided with a Dexcom G6 Pro sensor, transmitter, and a copy of the Continuous Glucose Monitoring Patient Log to fill out during the study.              A detailed  explanation of Continuous Glucose Monitoring was  provided. Patient informed that this is a blind procedure and that they will not actually see the blood sugar tracing in real time.    Instructed patient to check blood sugar using home glucometer and to record the following on provided patient log sheets:Blood sugar taken at home, Meals and snacks, Activity, and Diabetes medications taken and dosage               Patient was brought to a private location.  Site selected and prepared and allowed to dry. Glucose Transmitter Serial Number [id number]  was inserted to patient's abdomen.               The following forms  were given and reviewed in detail with patient and all questions answered.   Continuous Glucose Monitoring Patient Log   Dexcom G6 PRO Patient Handout "Blinded CGM Patient Handout"                 Instructions: Time: 15 min   Insertion of sensor:  Time: 5 minutes      "

## 2022-09-22 ENCOUNTER — CLINICAL SUPPORT (OUTPATIENT)
Dept: OTOLARYNGOLOGY | Facility: CLINIC | Age: 82
End: 2022-09-22
Payer: MEDICARE

## 2022-09-22 DIAGNOSIS — Z46.1 ENCOUNTER FOR FITTING AND ADJUSTMENT OF HEARING AID OF BOTH EARS: Primary | ICD-10-CM

## 2022-09-22 PROCEDURE — 99499 UNLISTED E&M SERVICE: CPT | Mod: ,,, | Performed by: AUDIOLOGIST-HEARING AID FITTER

## 2022-09-22 PROCEDURE — 99499 NO LOS: ICD-10-PCS | Mod: ,,, | Performed by: AUDIOLOGIST-HEARING AID FITTER

## 2022-09-22 NOTE — PROGRESS NOTES
Susannah Carter, CCC-A  Audiologist - [facility] [address] [place] [address]  [email or web address]  [phone]    Patient: Myesha Quinones   MRN: [id number]  [address]  Home Phone [phone]   Work Phone [phone]   Mobile [phone]   : 1940  PELAYO: 2022      HEARING AID FOLLOW-UP      Myesha Quinones is here today with her son, Macho, reporting that she has not been hearing well for at least 3 months.  Otoscopy revealed slight cerumen/dry skin with TMs visible AU.  Cleaned hearing aids, mics, & molds using wipe, brush, and air.  Changed out HF3 wax filters & listening check good AU.  Opened 2021 session and increased all Right side only x6 to try.  Adjusted Noise Tracker settings for moderate noise and moderate speech in noise and Environmental Optimizer settings for soft speech and loud speech, then saved.  Per patient request, will pair hearing aids to phone today and advised about incoming and outgoing phone calls.  Demonstrated in office and patient noted worked well for her to hear on the phone.  Advised, watch battery usage on phone and hearing aids.  Macho states he is familiar with changing the audio source if needed.  Myesha Quinones verbalized understanding and satisfaction with today's visit.  She will call if service is needed before her next appointment.    _______________________________  Susannah Carter, WINDY-A  Audiologist

## 2022-09-24 ENCOUNTER — PATIENT MESSAGE (OUTPATIENT)
Dept: OTOLARYNGOLOGY | Facility: CLINIC | Age: 82
End: 2022-09-24
Payer: MEDICARE

## 2022-09-26 ENCOUNTER — CLINICAL SUPPORT (OUTPATIENT)
Dept: ENDOCRINOLOGY | Facility: CLINIC | Age: 82
End: 2022-09-26
Payer: MEDICARE

## 2022-09-26 DIAGNOSIS — E11.65 UNCONTROLLED TYPE 2 DIABETES MELLITUS WITH HYPERGLYCEMIA: Primary | ICD-10-CM

## 2022-09-26 PROCEDURE — 95251 PR GLUCOSE MONITOR, 72 HOUR, PHYS INTERP: ICD-10-PCS | Mod: S$GLB,,, | Performed by: NURSE PRACTITIONER

## 2022-09-26 PROCEDURE — 95250 PR GLUCOSE MONITORING,72 HRS,SUB-Q SENSOR: ICD-10-PCS | Mod: S$GLB,,, | Performed by: NURSE PRACTITIONER

## 2022-09-26 PROCEDURE — 95251 CONT GLUC MNTR ANALYSIS I&R: CPT | Mod: S$GLB,,, | Performed by: NURSE PRACTITIONER

## 2022-09-26 PROCEDURE — 95250 CONT GLUC MNTR PHYS/QHP EQP: CPT | Mod: S$GLB,,, | Performed by: NURSE PRACTITIONER

## 2022-09-26 NOTE — PROGRESS NOTES
Return of the Dexcom G6 Pro Sensor and Patient Log.     Patient returned to clinic today to return Glucose Sensor and signed patient log form used in CGMS procedure.     The CGMS Sensor will be scanned and downloaded. All reports will be imported into the patient's electronic medical record.     Endocrine Provider will complete data interpretation and make recommendations; will forward recommendations to the ordering provider for follow up with patient.

## 2022-09-27 ENCOUNTER — PATIENT MESSAGE (OUTPATIENT)
Dept: ENDOCRINOLOGY | Facility: CLINIC | Age: 82
End: 2022-09-27
Payer: MEDICARE

## 2022-09-28 ENCOUNTER — CLINICAL SUPPORT (OUTPATIENT)
Dept: OTOLARYNGOLOGY | Facility: CLINIC | Age: 82
End: 2022-09-28
Payer: MEDICARE

## 2022-09-28 ENCOUNTER — OFFICE VISIT (OUTPATIENT)
Dept: UROGYNECOLOGY | Facility: CLINIC | Age: 82
End: 2022-09-28
Payer: MEDICARE

## 2022-09-28 VITALS
BODY MASS INDEX: 23.92 KG/M2 | DIASTOLIC BLOOD PRESSURE: 54 MMHG | WEIGHT: 130 LBS | SYSTOLIC BLOOD PRESSURE: 142 MMHG | HEIGHT: 62 IN

## 2022-09-28 DIAGNOSIS — Z46.1 ENCOUNTER FOR FITTING AND ADJUSTMENT OF HEARING AID OF BOTH EARS: Primary | ICD-10-CM

## 2022-09-28 DIAGNOSIS — U07.1 COVID-19 VIRUS INFECTION: ICD-10-CM

## 2022-09-28 DIAGNOSIS — R33.9 URINARY RETENTION: Primary | ICD-10-CM

## 2022-09-28 PROCEDURE — 99999 PR PBB SHADOW E&M-EST. PATIENT-LVL V: ICD-10-PCS | Mod: PBBFAC,,, | Performed by: PHYSICIAN ASSISTANT

## 2022-09-28 PROCEDURE — 99213 OFFICE O/P EST LOW 20 MIN: CPT | Mod: S$GLB,,, | Performed by: PHYSICIAN ASSISTANT

## 2022-09-28 PROCEDURE — 3288F FALL RISK ASSESSMENT DOCD: CPT | Mod: CPTII,S$GLB,, | Performed by: PHYSICIAN ASSISTANT

## 2022-09-28 PROCEDURE — 87086 URINE CULTURE/COLONY COUNT: CPT | Performed by: PHYSICIAN ASSISTANT

## 2022-09-28 PROCEDURE — 99499 NO LOS: ICD-10-PCS | Mod: ,,, | Performed by: AUDIOLOGIST-HEARING AID FITTER

## 2022-09-28 PROCEDURE — 99499 UNLISTED E&M SERVICE: CPT | Mod: ,,, | Performed by: AUDIOLOGIST-HEARING AID FITTER

## 2022-09-28 PROCEDURE — 3077F PR MOST RECENT SYSTOLIC BLOOD PRESSURE >= 140 MM HG: ICD-10-PCS | Mod: CPTII,S$GLB,, | Performed by: PHYSICIAN ASSISTANT

## 2022-09-28 PROCEDURE — 1101F PT FALLS ASSESS-DOCD LE1/YR: CPT | Mod: CPTII,S$GLB,, | Performed by: PHYSICIAN ASSISTANT

## 2022-09-28 PROCEDURE — 1126F PR PAIN SEVERITY QUANTIFIED, NO PAIN PRESENT: ICD-10-PCS | Mod: CPTII,S$GLB,, | Performed by: PHYSICIAN ASSISTANT

## 2022-09-28 PROCEDURE — 99213 PR OFFICE/OUTPT VISIT, EST, LEVL III, 20-29 MIN: ICD-10-PCS | Mod: S$GLB,,, | Performed by: PHYSICIAN ASSISTANT

## 2022-09-28 PROCEDURE — 3077F SYST BP >= 140 MM HG: CPT | Mod: CPTII,S$GLB,, | Performed by: PHYSICIAN ASSISTANT

## 2022-09-28 PROCEDURE — 99999 PR PBB SHADOW E&M-EST. PATIENT-LVL V: CPT | Mod: PBBFAC,,, | Performed by: PHYSICIAN ASSISTANT

## 2022-09-28 PROCEDURE — 1159F PR MEDICATION LIST DOCUMENTED IN MEDICAL RECORD: ICD-10-PCS | Mod: CPTII,S$GLB,, | Performed by: PHYSICIAN ASSISTANT

## 2022-09-28 PROCEDURE — 1159F MED LIST DOCD IN RCRD: CPT | Mod: CPTII,S$GLB,, | Performed by: PHYSICIAN ASSISTANT

## 2022-09-28 PROCEDURE — 1126F AMNT PAIN NOTED NONE PRSNT: CPT | Mod: CPTII,S$GLB,, | Performed by: PHYSICIAN ASSISTANT

## 2022-09-28 PROCEDURE — 3288F PR FALLS RISK ASSESSMENT DOCUMENTED: ICD-10-PCS | Mod: CPTII,S$GLB,, | Performed by: PHYSICIAN ASSISTANT

## 2022-09-28 PROCEDURE — 3078F DIAST BP <80 MM HG: CPT | Mod: CPTII,S$GLB,, | Performed by: PHYSICIAN ASSISTANT

## 2022-09-28 PROCEDURE — 1101F PR PT FALLS ASSESS DOC 0-1 FALLS W/OUT INJ PAST YR: ICD-10-PCS | Mod: CPTII,S$GLB,, | Performed by: PHYSICIAN ASSISTANT

## 2022-09-28 PROCEDURE — 3078F PR MOST RECENT DIASTOLIC BLOOD PRESSURE < 80 MM HG: ICD-10-PCS | Mod: CPTII,S$GLB,, | Performed by: PHYSICIAN ASSISTANT

## 2022-09-28 NOTE — PROGRESS NOTES
[facility] - UROGYNECOLOGY  [address] [address]  2022     Myesha Quinones  [id number]  1940    UROGYN FOLLOW-UP  2022     82 y.o. female,   presents for urogyn follow up. She c/o   Chief Complaint   Patient presents with    Follow-up    Pessary Check    .     HPI from 2022:  Patient pelvic organ prolapse referred for conservative therapy with pessary patient with indwelling De La Cruz catheter.  Soft that the pessary will assist in patient in decompressing bladder.     2022  Here for pessary fitting to help improve incomplete bladder emptying due to prolapse     Changes from last visit:  Patient doing well, using her vaginal estrogen cream about once weekly. Her pessary has been staying in place and comfortable, feels like she is emptying her bladder well and leaking is resolved. Denies UTI symptoms and constipation.     Past Medical History:   Diagnosis Date    Acute hypoxemic respiratory failure 2019    Acute right-sided thoracic back pain 2019    Allergy     Asthma     Basal cell carcinoma     left forehead    Basal cell carcinoma     left nose    Basal cell carcinoma 2015    right nose    Basal cell carcinoma 2015    left lower post neck    Basal cell carcinoma 2019    left ant scalp     Bilateral renal cysts     Breast cancer     CAD (coronary artery disease)     Cardiomyopathy     Cardiomyopathy, ischemic     Cataract     CHF (congestive heart failure)     CKD (chronic kidney disease) stage 4, GFR 15-29 ml/min     Colon polyp     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, with long-term current use of insulin 2018    COPD (chronic obstructive pulmonary disease)     COPD exacerbation 2018    Current mild episode of major depressive disorder without prior episode 2022    Defibrillator discharge     Diabetes mellitus      Diabetes mellitus type II     Diabetes with neurologic complications     Edema     Goiter     MNG    Hematuria, unspecified     HX: breast cancer     Hyperlipidemia     Hypertension     Hypocalcemia     Hypokalemia     Hyponatremia     Hyponatremia 04/02/2022    Iron deficiency anemia 05/16/2017    Iron deficiency anemia     Iron deficiency anemia     Left kidney mass     Meningioma     Microalbuminuria due to type 2 diabetes mellitus 01/26/2022    Osteoporosis, postmenopausal     Pneumonia 12/08/2019    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    Proteinuria 01/21/2019    Pseudomonas pneumonia     Skin cancer     s/p excision    Sleep apnea     CPAP    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/2015    mid forehead    Unspecified vitamin D deficiency     UTI (urinary tract infection) 04/02/2022    Ventricular tachycardia     Vitamin B12 deficiency     Vitamin D deficiency disease        Past Surgical History:   Procedure Laterality Date    BASAL CELL CARCINOMA EXCISION      posterior neck and nose    BREAST BIOPSY      BREAST CYST EXCISION Left     BREAST SURGERY      CARDIAC DEFIBRILLATOR PLACEMENT      x 2    CATARACT EXTRACTION W/  INTRAOCULAR LENS IMPLANT Bilateral     CHOLECYSTECTOMY      COLONOSCOPY N/A 11/5/2019    Procedure: COLONOSCOPY;  Surgeon: Boaz Botello MD;  Location: [facility] ENDO ([address] [place] [address]);  Service: Endoscopy;  Laterality: N/A;  [facility]     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    FRACTURE SURGERY      left elbow and wrist as a child    HYSTERECTOMY      MASTECTOMY Right     REPLACEMENT OF IMPLANTABLE CARDIOVERTER-DEFIBRILLATOR (ICD) GENERATOR N/A 12/17/2018    Procedure: REPLACEMENT, ICD GENERATOR;  Surgeon: Jan Mckeon MD;  Location: [facility] EP LAB;  Service: Cardiology;  Laterality: N/A;  DONNA, CRT-D gen change, MDT, MAC, SK, 3 Prep    REVISION OF SKIN POCKET FOR CARDIOVERTER-DEFIBRILLATOR  12/17/2018    Procedure: Revision, Skin Pocket, For Cardioverter-Defibrillator;  Surgeon: Jan  MD Mike;  Location: [facility];  Service: Cardiology;;    SQUAMOUS CELL CARCINOMA EXCISION      remved from forehead    TONSILLECTOMY         Family History   Problem Relation Age of Onset    Diabetes Father     Heart disease Father     Diabetes Sister     Heart disease Sister     Diabetes Brother     Heart disease Brother     Hypertension Brother     Diabetes Brother     Heart disease Brother     Hypertension Brother     Diabetes Brother     Heart disease Brother     Cancer Brother         colon    Diabetes Brother     Cancer Son         skin    Diabetes Son         prediabetes    Diabetes Daughter         prediabetes    Cancer Daughter         melanoma    Obesity Daughter     Melanoma Daughter     Diabetes Son     Asthma Mother     Hypertension Mother     Stroke Mother     No Known Problems Maternal Grandmother     No Known Problems Maternal Grandfather     No Known Problems Paternal Grandmother     No Known Problems Paternal Grandfather     Amblyopia Neg Hx     Blindness Neg Hx     Cataracts Neg Hx     Glaucoma Neg Hx     Macular degeneration Neg Hx     Retinal detachment Neg Hx     Strabismus Neg Hx     Thyroid disease Neg Hx        Social History     Socioeconomic History    Marital status:    Occupational History    Occupation: Homemaker     Employer: OTHER   Tobacco Use    Smoking status: Never    Smokeless tobacco: Never   Substance and Sexual Activity    Alcohol use: No     Alcohol/week: 0.0 standard drinks    Drug use: No    Sexual activity: Yes     Partners: Male   Other Topics Concern    Are you pregnant or think you may be? No    Breast-feeding No     Social Determinants of Health     Financial Resource Strain: Low Risk     Difficulty of Paying Living Expenses: Not hard at all   Food Insecurity: No Food Insecurity    Worried About Running Out of Food in the Last Year: Never true    Ran Out of Food in the Last Year: Never true   Transportation Needs: No Transportation Needs    Lack of  Transportation (Medical): No    Lack of Transportation (Non-Medical): No   Housing Stability: Low Risk     Unable to Pay for Housing in the Last Year: No    Number of Places Lived in the Last Year: 1    Unstable Housing in the Last Year: No       Current Outpatient Medications   Medication Sig Dispense Refill    acetaminophen (TYLENOL) 500 MG tablet Take 500 mg by mouth as needed.      albuterol (PROVENTIL/VENTOLIN HFA) 90 mcg/actuation inhaler INHALE 2 PUFFS INTO THE LUNGS EVERY 6 (SIX) HOURS AS NEEDED FOR WHEEZING. RESCUE 18 g 12    albuterol-ipratropium (DUO-NEB) 2.5 mg-0.5 mg/3 mL nebulizer solution TAKE 3 MLS BY NEBULIZATION EVERY 4 (FOUR) HOURS AS NEEDED FOR WHEEZING. 270 mL 12    aspirin 81 MG Chew Take 81 mg by mouth once daily.      AVASTIN 25 mg/mL injection       benzonatate (TESSALON) 100 MG capsule Take 1 capsule (100 mg total) by mouth 3 (three) times daily as needed for Cough. 20 capsule 0    blood sugar diagnostic (ACCU-CHEK HECTOR) Strp Uses Accu-Check Hector meter to test BG 4x/day 400 strip 6    carvediloL (COREG) 25 MG tablet TAKE 2 TABLETS (50 MG TOTAL) BY MOUTH 2 (TWO) TIMES DAILY. 360 tablet 3    cholecalciferol, vitamin D3, (VITAMIN D3) 50 mcg (2,000 unit) Tab Take 1 tablet by mouth once daily.       cyanocobalamin (VITAMIN B-12) 100 MCG tablet Take 100 mcg by mouth once daily.      dulaglutide (TRULICITY) 0.75 mg/0.5 mL pen injector Inject 0.75 mg into the skin every 7 days. 4 pen 11    fluticasone propionate (FLONASE) 50 mcg/actuation nasal spray 2 sprays (100 mcg total) by Each Nostril route once daily. 16 g 12    fluticasone-salmeterol diskus inhaler 250-50 mcg Inhale 1 puff into the lungs as needed. Controller      furosemide (LASIX) 20 MG tablet       furosemide (LASIX) 40 MG tablet Take 1 tablet (40 mg total) by mouth once daily. 90 tablet 3    hydrALAZINE (APRESOLINE) 100 MG tablet Take 100 mg by mouth 2 (two) times daily.      insulin (LANTUS SOLOSTAR U-100 INSULIN) glargine 100  "units/mL SubQ pen Inject 10 Units into the skin 2 (two) times daily. (Patient taking differently: Inject 11 Units into the skin 2 (two) times daily.) 30 mL 3    lancets (ACCU-CHEK SOFTCLIX LANCETS) Misc Uses Accu-Chek Angelica meter to test BG 1x/day 400 each 6    linaGLIPtin (TRADJENTA) 5 mg Tab tablet Take 1 tablet (5 mg total) by mouth once daily. 90 tablet 3    magnesium oxide (MAG-OX) 400 mg tablet Take 1 tablet (400 mg total) by mouth once daily. (Patient taking differently: Take 400 mg by mouth 3 (three) times daily.)  0    montelukast (SINGULAIR) 10 mg tablet Take 1 tablet (10 mg total) by mouth every evening. (Patient taking differently: Take 10 mg by mouth once daily.) 90 tablet 3    nitroGLYCERIN (NITROSTAT) 0.4 MG SL tablet Place 0.4 mg under the tongue every 5 (five) minutes as needed for Chest pain.       omega-3 fatty acids 500 mg Cap Take 1 capsule by mouth Daily.      pen needle, diabetic (BD ULTRA-FINE MINI PEN NEEDLE) 31 gauge x 3/16" Ndle USE WITH LANTUS AT BEDTIME 400 each 3    polyethylene glycol (GLYCOLAX) 17 gram PwPk Take 17 g by mouth daily as needed (constipation). 10 each 1    pravastatin (PRAVACHOL) 40 MG tablet TAKE 1 TABLET BY MOUTH EVERY DAY (Patient taking differently: Take 40 mg by mouth once daily.) 90 tablet 1    promethazine (PHENERGAN) 12.5 MG Tab Take 1 tablet (12.5 mg total) by mouth every 6 (six) hours as needed (nausea). 30 tablet 2    pulse oximeter (PULSE OXIMETER) device by Apply Externally route 2 (two) times a day. Use twice daily at 8 AM and 3 PM and record the value in [employer] as directed. 1 each 0    sodium bicarbonate 650 MG tablet Take 2 tablets (1,300 mg total) by mouth 3 (three) times daily. 180 tablet 11    valsartan (DIOVAN) 80 MG tablet Take 1 tablet (80 mg total) by mouth once daily. 90 tablet 3     No current facility-administered medications for this visit.       Review of patient's allergies indicates:   Allergen Reactions    Iodine and iodide containing " "products Hives and Other (See Comments)     Not specified     Nifedipine      weakness       OB History          3    Para   3    Term   3            AB        Living             SAB        IAB        Ectopic        Multiple        Live Births                      ROS  As per HPI.      Physical Exam  BP (!) 142/54 (BP Location: Left arm, Patient Position: Sitting, BP Method: Medium (Manual))   Ht 5' 2" (1.575 m)   Wt 59 kg (130 lb)   LMP  (LMP Unknown)   BMI 23.78 kg/m²   General: alert and oriented, no acute distress  Respiratory: normal respiratory effort  Abd: soft, non-tender, non-distended  Pelvic:  Ext. Genitalia: normal external genitalia. Normal bartholin's and skeens glands  Vagina: + atrophy. Normal vaginal mucosa without lesions. No discharge noted.   Non-tender bladder base without palpable mass.  Cervix: absent  Uterus:  surgically absent, vaginal cuff well healed   Urethra: no masses or tenderness  Urethral meatus: no lesions, caruncle or prolapse.    Pessary removed, cleaned and reinserted without difficulty    Impression  1. Urinary retention  Urine culture      2. COVID-19 virus infection  Ambulatory referral/consult to Urology        We reviewed the above issues and discussed options for short-term versus long-term management of her problems.     Plan:   Incomplete bladder emptying due to prolapse  -- pessary fitting #5 ring with support and incontinence knob     Vaginal atrophy (dryness):  --  Use 1 gram of estrogen cream inside vagina two to three nights a week. Apply to opening of vagina, as well , as inside vagina and at inner lips outside vagina.      RTC in 3-4 months for pessary check and repeat PVR    Eric Nelson PA-C  Division of Female Pelvic Medicine and Reconstructive Surgery  Department of Obstetrics & Gynecology  [facility] [place], [place]        "

## 2022-09-28 NOTE — PROGRESS NOTES
Susannah Carter, CCC-A  Audiologist - [facility] [address] [place] [address]  [email or web address]  [phone]    Patient: Myesha Quinones   MRN: [id number]  [address] [facility]  Home Phone [phone]   Work Phone [phone]   Mobile [phone]   : 1940  PELAYO: 2022      HEARING AID FOLLOW-UP      Myesha Quinones is here today reporting that her Left hearing aid mold #[id number] wire came out of the faceplate.  Sending to [employer] for in-warranty service and replacement.  Myesha Quinones verbalized understanding with today's visit and will call if service is needed before her hearing aid arrives from repair.      _______________________________  Susannah Carter, WINDY-A  Audiologist

## 2022-09-28 NOTE — PATIENT INSTRUCTIONS
Incomplete bladder emptying due to prolapse  -- pessary fitting #5 ring with support and incontinence knob     Vaginal atrophy (dryness):  --  Use 1 gram of estrogen cream inside vagina two to three nights a week. Apply to opening of vagina, as well , as inside vagina and at inner lips outside vagina.      RTC in 3-4 months for pessary check and repeat PVR

## 2022-09-29 ENCOUNTER — LAB VISIT (OUTPATIENT)
Dept: LAB | Facility: HOSPITAL | Age: 82
End: 2022-09-29
Attending: NURSE PRACTITIONER
Payer: MEDICARE

## 2022-09-29 DIAGNOSIS — E11.3213 CONTROLLED TYPE 2 DIABETES MELLITUS WITH BOTH EYES AFFECTED BY MILD NONPROLIFERATIVE RETINOPATHY AND MACULAR EDEMA, WITH LONG-TERM CURRENT USE OF INSULIN: ICD-10-CM

## 2022-09-29 DIAGNOSIS — Z79.4 CONTROLLED TYPE 2 DIABETES MELLITUS WITH BOTH EYES AFFECTED BY MILD NONPROLIFERATIVE RETINOPATHY AND MACULAR EDEMA, WITH LONG-TERM CURRENT USE OF INSULIN: ICD-10-CM

## 2022-09-29 LAB
ALBUMIN SERPL BCP-MCNC: 3.6 G/DL (ref 3.5–5.2)
ALP SERPL-CCNC: 123 U/L (ref 55–135)
ALT SERPL W/O P-5'-P-CCNC: 12 U/L (ref 10–44)
ANION GAP SERPL CALC-SCNC: 14 MMOL/L (ref 8–16)
AST SERPL-CCNC: 19 U/L (ref 10–40)
BILIRUB SERPL-MCNC: 0.4 MG/DL (ref 0.1–1)
BUN SERPL-MCNC: 49 MG/DL (ref 8–23)
CALCIUM SERPL-MCNC: 8.8 MG/DL (ref 8.7–10.5)
CHLORIDE SERPL-SCNC: 99 MMOL/L (ref 95–110)
CO2 SERPL-SCNC: 25 MMOL/L (ref 23–29)
CREAT SERPL-MCNC: 2.8 MG/DL (ref 0.5–1.4)
EST. GFR  (NO RACE VARIABLE): 16.2 ML/MIN/1.73 M^2
ESTIMATED AVG GLUCOSE: 111 MG/DL (ref 68–131)
GLUCOSE SERPL-MCNC: 96 MG/DL (ref 70–110)
HBA1C MFR BLD: 5.5 % (ref 4–5.6)
POTASSIUM SERPL-SCNC: 4.1 MMOL/L (ref 3.5–5.1)
PROT SERPL-MCNC: 6.9 G/DL (ref 6–8.4)
SODIUM SERPL-SCNC: 138 MMOL/L (ref 136–145)

## 2022-09-29 PROCEDURE — 82985 ASSAY OF GLYCATED PROTEIN: CPT | Performed by: NURSE PRACTITIONER

## 2022-09-29 PROCEDURE — 80053 COMPREHEN METABOLIC PANEL: CPT | Performed by: NURSE PRACTITIONER

## 2022-09-29 PROCEDURE — 83036 HEMOGLOBIN GLYCOSYLATED A1C: CPT | Mod: 59 | Performed by: NURSE PRACTITIONER

## 2022-09-30 LAB — BACTERIA UR CULT: NO GROWTH

## 2022-10-03 ENCOUNTER — OFFICE VISIT (OUTPATIENT)
Dept: ENDOCRINOLOGY | Facility: CLINIC | Age: 82
End: 2022-10-03
Payer: MEDICARE

## 2022-10-03 ENCOUNTER — PATIENT MESSAGE (OUTPATIENT)
Dept: ENDOCRINOLOGY | Facility: CLINIC | Age: 82
End: 2022-10-03
Payer: MEDICARE

## 2022-10-03 DIAGNOSIS — E04.2 NONTOXIC MULTINODULAR GOITER: ICD-10-CM

## 2022-10-03 DIAGNOSIS — M81.0 OSTEOPOROSIS, UNSPECIFIED OSTEOPOROSIS TYPE, UNSPECIFIED PATHOLOGICAL FRACTURE PRESENCE: ICD-10-CM

## 2022-10-03 DIAGNOSIS — N18.4 CKD (CHRONIC KIDNEY DISEASE) STAGE 4, GFR 15-29 ML/MIN: ICD-10-CM

## 2022-10-03 DIAGNOSIS — E11.3213 CONTROLLED TYPE 2 DIABETES MELLITUS WITH BOTH EYES AFFECTED BY MILD NONPROLIFERATIVE RETINOPATHY AND MACULAR EDEMA, WITH LONG-TERM CURRENT USE OF INSULIN: Primary | ICD-10-CM

## 2022-10-03 DIAGNOSIS — Z79.4 CONTROLLED TYPE 2 DIABETES MELLITUS WITH BOTH EYES AFFECTED BY MILD NONPROLIFERATIVE RETINOPATHY AND MACULAR EDEMA, WITH LONG-TERM CURRENT USE OF INSULIN: Primary | ICD-10-CM

## 2022-10-03 DIAGNOSIS — I50.43 ACUTE ON CHRONIC COMBINED SYSTOLIC AND DIASTOLIC CHF (CONGESTIVE HEART FAILURE): ICD-10-CM

## 2022-10-03 DIAGNOSIS — E27.8 ADRENAL CORTICAL NODULE: ICD-10-CM

## 2022-10-03 LAB — FRUCTOSAMINE SERPL-SCNC: 310 UMOL /L

## 2022-10-03 PROCEDURE — 1159F PR MEDICATION LIST DOCUMENTED IN MEDICAL RECORD: ICD-10-PCS | Mod: CPTII,95,, | Performed by: NURSE PRACTITIONER

## 2022-10-03 PROCEDURE — 99214 PR OFFICE/OUTPT VISIT, EST, LEVL IV, 30-39 MIN: ICD-10-PCS | Mod: 95,,, | Performed by: NURSE PRACTITIONER

## 2022-10-03 PROCEDURE — 1160F RVW MEDS BY RX/DR IN RCRD: CPT | Mod: CPTII,95,, | Performed by: NURSE PRACTITIONER

## 2022-10-03 PROCEDURE — 1160F PR REVIEW ALL MEDS BY PRESCRIBER/CLIN PHARMACIST DOCUMENTED: ICD-10-PCS | Mod: CPTII,95,, | Performed by: NURSE PRACTITIONER

## 2022-10-03 PROCEDURE — 99214 OFFICE O/P EST MOD 30 MIN: CPT | Mod: 95,,, | Performed by: NURSE PRACTITIONER

## 2022-10-03 PROCEDURE — 1159F MED LIST DOCD IN RCRD: CPT | Mod: CPTII,95,, | Performed by: NURSE PRACTITIONER

## 2022-10-03 NOTE — ASSESSMENT & PLAN NOTE
-- Labs prior to follow up. Include fructosamine.   -- A1c goal < or =7.5%.  -- Medications discussed:  MFM - stopped due to CKD  GLP1-DPP4   SARAH   SGLT2   Reviewed potential adverse effects of SGLT-2 inhibitors, including genital mycotic infections, slightly increased risk of UTI, hypersensitivity, hypotension, and hyperkalemia. Advised to maintain water intake of 8-10 cups per day. Advised we need to check chemistry panel at baseline and 2 weeks after starting. Discussed FDA warning reports of ketoacidosis associated with SGLT-2 inhibitors. Advised to seek immediate medical attention and stop the medication if symptoms such as difficulty breathing, nausea, vomiting, abdominal pain, confusion, and unusual fatigue/sleepiness. Discussed possible precipitating factors including major illness/reduced food and fluid intake (advised to stop under these circumstances), and reduced insulin dose. Discussed possible effects of increased fracture risk/decreased bone density. Discussed reports of increased risk of leg and foot amputations with canagliflozin and need to seek urgent care if developed new pain or tenderness, sores or ulcers, or infections in legs/feet.   Insulin   -- Reviewed logs/CGM:  Reviewed professional CGM.  Reach out to me sooner for any glucose <70 or consistently >200.  -- Medication Changes:   Lantus 11units twice daily  - 12 hours apart   Tradjenta 5mg daily  -- Reviewed goals of therapy are to get the best control we can without hypoglycemia.  -- Reviewed patient's current insulin regimen. Clarified proper insulin dose and timing in relation to meals, etc. Insulin injection sites and proper rotation instructed.    -- Advised frequent self blood glucose monitoring.  Patient encouraged to document glucose results and bring them to every clinic visit.  -- Hypoglycemia precautions discussed. Instructed on precautions before driving.    -- Call for Bg repeatedly < 90 or > 180.   -- Close adherence to  lifestyle changes recommended.   -- Periodic follow ups for eye evaluations, foot care and dental care suggested.

## 2022-10-03 NOTE — Clinical Note
Schedule thyroid US Virtual visit in 4 months with labs prior Orders Placed This Encounter     US Soft Tissue Head Neck Thyroid     Glucose, Random     Hemoglobin A1C     Fructosamine

## 2022-10-03 NOTE — PROGRESS NOTES
Subjective:      Patient ID: Myesha Quinones is a 82 y.o. female.    Chief Complaint:  No chief complaint on file.    History of Present Illness  Myesha Quinones is here for follow up of T2DM, MNG, osteoporosis and adrenal nodule.  Previously seen by me on 7/2022.  This is a MyChart video visit.    The patient location is: [place]  The chief complaint leading to consultation is: DM  Visit type: Virtual visit with synchronous audio and video  Total time spent with patient: see below  Each patient to whom he or she provides medical services by telemedicine is:  (1) informed of the relationship between the physician and patient and the respective role of any other health care provider with respect to management of the patient; and (2) notified that he or she may decline to receive medical services by telemedicine and may withdraw from such care at any time.      Patients son present for visit.      With regards to diabetes:    Diagnosed: 1993  Known complications:  DKA -  RN +  PN +  Nephropathy + follows with nephrology  CAD +    Current regimen:  Lantus 11units twice daily   Tradjenta 5mg daily     Other medications tried:  Metformin - CKD   Tradjenta  Farxiga - never started  Trulicity - cost    Glucose Monitor:   2 times a day testing  Log reviewed: oral recall    Hypoglycemia:  Denies  Knows how to correct with 15 grams of carbs- juice, coke, or a peppermint.     Diet/Exercise:  Eats 3 meals a day.   B: oatmeal, 1/2 banana / cereal/ egg  L: humana prepared meals - meat, vegetable, rice or pasta (small portion)  D: prepared meal / meat, vegetable  Snacks : occasionally - sugar free jello or pudding, nuts, fruit.   Drinks : water, coffee, NO soft drinks.  Exercise: None.      Education - last visit: in the past  Eye Exam: 10/2021  Podiatry: 11/2019    Diabetes Management Status    Hemoglobin A1C   Date Value Ref Range Status   09/29/2022 5.5 4.0 - 5.6 % Final     Comment:     ADA Screening Guidelines:  5.7-6.4%   Consistent with prediabetes  >or=6.5%  Consistent with diabetes    High levels of fetal hemoglobin interfere with the HbA1C  assay. Heterozygous hemoglobin variants (HbS, HgC, etc)do  not significantly interfere with this assay.   However, presence of multiple variants may affect accuracy.     07/08/2022 8.0 (H) 4.0 - 5.6 % Final     Comment:     ADA Screening Guidelines:  5.7-6.4%  Consistent with prediabetes  >or=6.5%  Consistent with diabetes    High levels of fetal hemoglobin interfere with the HbA1C  assay. Heterozygous hemoglobin variants (HbS, HgC, etc)do  not significantly interfere with this assay.   However, presence of multiple variants may affect accuracy.     06/21/2022 7.9 (H) 4.0 - 5.6 % Final     Comment:     ADA Screening Guidelines:  5.7-6.4%  Consistent with prediabetes  >or=6.5%  Consistent with diabetes    High levels of fetal hemoglobin interfere with the HbA1C  assay. Heterozygous hemoglobin variants (HbS, HgC, etc)do  not significantly interfere with this assay.   However, presence of multiple variants may affect accuracy.         Statin: Taking  ACE/ARB: Taking  Screening or Prevention Patient's value Goal Complete/Controlled?   HgA1C Testing and Control   Lab Results   Component Value Date    HGBA1C 5.5 09/29/2022      Annually/Less than 8% Yes   Lipid profile : 04/03/2022 Annually Yes   LDL control Lab Results   Component Value Date    LDLCALC 29.8 (L) 04/03/2022    Annually/Less than 100 mg/dl  Yes   Nephropathy screening Lab Results   Component Value Date    LABMICR 1107.0 08/08/2022     Lab Results   Component Value Date    PROTEINUA 2+ (A) 08/08/2022    Annually Yes   Blood pressure BP Readings from Last 1 Encounters:   09/28/22 (!) 142/54    Less than 140/90 No   Dilated retinal exam : 08/26/2022 Annually Yes   Foot exam   Most Recent Foot Exam Date: Not Found Annually No     With regards to thyroid nodule:    Thyroid US:   11/19/2020  1.  Thyroid gland is normal in size with  "heterogenous echotexture.  2.  Two nodules in the right thyroid described above.  None meet criteria for fine-needle aspiration.  3.  Left superior lobe nodule is unchanged.  It does not meet criteria for fine-needle aspiration.  RECOMMENDATIONS:  Follow-up ultrasound in 1-2 years is recommended    FNA:   Left nodule: Benign - 2016  Right nodule: Benign - 2014    Signs or Symptoms:   Difficulty breathing: Denies  Difficulty swallowing: Denies  Voice Changes: Hoarse  FH of thyroid cancer: Denies  Personal history of radiation treatment or exposure: Denies     Lab Results   Component Value Date    TSH 0.422 07/08/2022    FREET4 1.22 09/16/2016       Denies signs or symptoms of hyper or hypothyroidism.    With regards to osteoporosis:     BMD:   4/30/2018  Osteopenia of the femoral neck and lumbar spine -- FRAX score supports treatment  RECOMMENDATIONS of [facility]:  1.  Calcium 5417-8510 mg daily and vitamin D 800-1000 units daily, adequate exercise.  2.  Recommended therapy Consider bisphosphonates (alendronate, risedronate, ibandronate, zolendronic acid) or denosumab.  3.  Repeat BMD in 2 years    Medications:   Alendronate - did not tolerate due to bone pain.  She was also offered prolia, but she read about the side-effects and is not interested in starting it or any other therapy for osteoporosis.  Calcium intake: None  Vit D intake: OTC Vit D3 2000iu daily     Weight bearing exercise: None  Falls: Denies  Fractures: Denies    With regards to adrenal nodule:     Was found to have bilateral adrenal nodules found incidentally on CT scan "many" years ago. She reports she was initially having yearly CT scans to follow them, but it was recommended to stop doing them due to lack of growth. There are two CT scans in our system (2016 and 2017).     CT Abd Pel WO Con  12/7/2017  A right adrenal nodule is stable in size when compared to 3/10/16, measuring 2.5 cm.  A left adrenal " nodule is slightly larger on today's examination measuring 2.0 cm, previously 1.7 cm.    12/7/2019  There is a left adrenal nodule measuring 2.6 x 2.1 cm, as well as a right adrenal nodule measuring 2.7 x 1.7 cm, which are stable in size and appearance when compared to CT 12/07/2017.    Reports a functional workup was done in the past. Unable to find record of this in Epic or [facility] - evaluation was done prior?    Review of Systems   Constitutional:  Negative for fatigue.   Eyes:  Negative for visual disturbance.   Respiratory:  Positive for shortness of breath (on exertion).    Cardiovascular:  Negative for chest pain.   Gastrointestinal:  Negative for abdominal pain.   Musculoskeletal:  Negative for arthralgias.   Skin:  Negative for wound.   Neurological:  Negative for headaches.       There is no height or weight on file to calculate BMI.    Lab Review:   Lab Results   Component Value Date    HGBA1C 5.5 09/29/2022    HGBA1C 8.0 (H) 07/08/2022    HGBA1C 7.9 (H) 06/21/2022       Lab Results   Component Value Date    CHOL 96 (L) 04/03/2022    HDL 39 (L) 04/03/2022    LDLCALC 29.8 (L) 04/03/2022    TRIG 136 04/03/2022    CHOLHDL 40.6 04/03/2022     Lab Results   Component Value Date     09/29/2022    K 4.1 09/29/2022    CL 99 09/29/2022    CO2 25 09/29/2022    GLU 96 09/29/2022    BUN 49 (H) 09/29/2022    CREATININE 2.8 (H) 09/29/2022    CALCIUM 8.8 09/29/2022    PROT 6.9 09/29/2022    ALBUMIN 3.6 09/29/2022    BILITOT 0.4 09/29/2022    ALKPHOS 123 09/29/2022    AST 19 09/29/2022    ALT 12 09/29/2022    ANIONGAP 14 09/29/2022    ESTGFRAFRICA 23.2 (A) 07/25/2022    EGFRNONAA 20.1 (A) 07/25/2022    TSH 0.422 07/08/2022     Vit D, 25-Hydroxy   Date Value Ref Range Status   09/16/2022 32 30 - 96 ng/mL Final     Comment:     Vitamin D deficiency.........<10 ng/mL                              Vitamin D insufficiency......10-29 ng/mL       Vitamin D sufficiency........> or equal to 30 ng/mL  Vitamin D  toxicity............>100 ng/mL       Assessment and Plan     1. 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 Nontoxic multinodular goiter  US Soft Tissue Head Neck Thyroid      3. Osteoporosis, unspecified osteoporosis type, unspecified pathological fracture presence        4. Adrenal cortical nodule: repeat CT 6/2016        5. CKD (chronic kidney disease) stage 4, GFR 15-29 ml/min        6. 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 Include fructosamine.   -- A1c goal < or =7.5%.  -- Medications discussed:  MFM - stopped due to CKD  GLP1-DPP4   SARAH   SGLT2   Reviewed potential adverse effects of SGLT-2 inhibitors, including genital mycotic infections, slightly increased risk of UTI, hypersensitivity, hypotension, and hyperkalemia. Advised to maintain water intake of 8-10 cups per day. Advised we need to check chemistry panel at baseline and 2 weeks after starting. Discussed FDA warning reports of ketoacidosis associated with SGLT-2 inhibitors. Advised to seek immediate medical attention and stop the medication if symptoms such as difficulty breathing, nausea, vomiting, abdominal pain, confusion, and unusual fatigue/sleepiness. Discussed possible precipitating factors including major illness/reduced food and fluid intake (advised to stop under these circumstances), and reduced insulin dose. Discussed possible effects of increased fracture risk/decreased bone density. Discussed reports of increased risk of leg and foot amputations with canagliflozin and need to seek urgent care if developed new pain or tenderness, sores or  ulcers, or infections in legs/feet.   Insulin   -- Reviewed logs/CGM:  Reviewed professional CGM.  Reach out to me sooner for any glucose <70 or consistently >200.  -- Medication Changes:   Lantus 11units twice daily  - 12 hours apart   Tradjenta 5mg daily  -- Reviewed goals of therapy are to get the best control we can without hypoglycemia.  -- Reviewed patient's current insulin regimen. Clarified proper insulin dose and timing in relation to meals, etc. Insulin injection sites and proper rotation instructed.    -- Advised frequent self blood glucose monitoring.  Patient encouraged to document glucose results and bring them to every clinic visit.  -- Hypoglycemia precautions discussed. Instructed on precautions before driving.    -- Call for Bg repeatedly < 90 or > 180.   -- Close adherence to lifestyle changes recommended.   -- Periodic follow ups for eye evaluations, foot care and dental care suggested.    Nontoxic multinodular goiter  -- Thyroid US due 11/2022.  -- Denies compressive symptoms.  -- FNA:   Left nodule: Benign - 2016  Right nodule: Benign - 2014    Osteoporosis  -- Refused BMD due to it not changing her approach to management.  -- Patient does not wish to pursue antiresorptive treatments citing previous side-effects and wanting to avoid new medications due to fear of side-effects (Prolia specifically).  -- Taking vitamin D 2000 IU daily and she gets calcium in her diet.  -- Suggested walking for weight bearing exercise.    Adrenal cortical nodule: repeat CT 6/2016  -- Stable on imaging for over a decade suggests benign adenomas. She thinks she had a biochemical workup in the past, but the results aren't available in our system as they predate 2004. Blood pressure is reasonably well-controlled. No hypokalemia. No symptoms to suggest pheo, and no overt Cushing syndrome. Subclinical Cushing is a possibility, but the benefit vs. risk of adrenalectomy would very likely favor conservative management.  Therefore, will hold off on further testing at this point since it would be unlikely to alter management.    CKD (chronic kidney disease) stage 4, GFR 15-29 ml/min  -- Optimize glucose control.     Acute on chronic combined systolic and diastolic CHF (congestive heart failure)  -- Continue following with cardiology.      Follow up in about 4 months (around 2/3/2023).        I spent 25 minutes face-to-face with the patient, over half of the visit was spent on counseling and/or coordinating the care of the patient.    Counseling includes:  Diagnostic results, impressions, recommendations   Prognosis   Risk and benefits of management/treatment options   Instructions for management treatment and or follow-up   Importance of compliance with management   Risk factor reduction   Patient education

## 2022-10-07 ENCOUNTER — OFFICE VISIT (OUTPATIENT)
Dept: FAMILY MEDICINE | Facility: CLINIC | Age: 82
End: 2022-10-07
Attending: FAMILY MEDICINE
Payer: MEDICARE

## 2022-10-07 VITALS
OXYGEN SATURATION: 97 % | TEMPERATURE: 98 F | HEART RATE: 75 BPM | DIASTOLIC BLOOD PRESSURE: 65 MMHG | HEIGHT: 62 IN | BODY MASS INDEX: 24.63 KG/M2 | WEIGHT: 133.81 LBS | SYSTOLIC BLOOD PRESSURE: 130 MMHG

## 2022-10-07 DIAGNOSIS — E11.42 DIABETIC POLYNEUROPATHY ASSOCIATED WITH TYPE 2 DIABETES MELLITUS: Primary | ICD-10-CM

## 2022-10-07 DIAGNOSIS — I15.2 HYPERTENSION ASSOCIATED WITH DIABETES: ICD-10-CM

## 2022-10-07 DIAGNOSIS — E11.59 HYPERTENSION ASSOCIATED WITH DIABETES: ICD-10-CM

## 2022-10-07 DIAGNOSIS — E11.29 MICROALBUMINURIA DUE TO TYPE 2 DIABETES MELLITUS: ICD-10-CM

## 2022-10-07 DIAGNOSIS — I50.42 CHRONIC COMBINED SYSTOLIC AND DIASTOLIC HEART FAILURE: ICD-10-CM

## 2022-10-07 DIAGNOSIS — E11.69 HYPERLIPIDEMIA ASSOCIATED WITH TYPE 2 DIABETES MELLITUS: ICD-10-CM

## 2022-10-07 DIAGNOSIS — N18.32 TYPE 2 DIABETES MELLITUS WITH STAGE 3B CHRONIC KIDNEY DISEASE, WITH LONG-TERM CURRENT USE OF INSULIN: ICD-10-CM

## 2022-10-07 DIAGNOSIS — F32.0 CURRENT MILD EPISODE OF MAJOR DEPRESSIVE DISORDER WITHOUT PRIOR EPISODE: ICD-10-CM

## 2022-10-07 DIAGNOSIS — Z79.4 TYPE 2 DIABETES MELLITUS WITH STAGE 3B CHRONIC KIDNEY DISEASE, WITH LONG-TERM CURRENT USE OF INSULIN: ICD-10-CM

## 2022-10-07 DIAGNOSIS — E11.22 TYPE 2 DIABETES MELLITUS WITH STAGE 3B CHRONIC KIDNEY DISEASE, WITH LONG-TERM CURRENT USE OF INSULIN: ICD-10-CM

## 2022-10-07 DIAGNOSIS — Z23 IMMUNIZATION DUE: ICD-10-CM

## 2022-10-07 DIAGNOSIS — N18.4 CKD (CHRONIC KIDNEY DISEASE) STAGE 4, GFR 15-29 ML/MIN: ICD-10-CM

## 2022-10-07 DIAGNOSIS — E78.5 HYPERLIPIDEMIA ASSOCIATED WITH TYPE 2 DIABETES MELLITUS: ICD-10-CM

## 2022-10-07 DIAGNOSIS — R80.9 MICROALBUMINURIA DUE TO TYPE 2 DIABETES MELLITUS: ICD-10-CM

## 2022-10-07 PROCEDURE — 3078F PR MOST RECENT DIASTOLIC BLOOD PRESSURE < 80 MM HG: ICD-10-PCS | Mod: CPTII,S$GLB,, | Performed by: FAMILY MEDICINE

## 2022-10-07 PROCEDURE — G0008 FLU VACCINE - QUADRIVALENT - ADJUVANTED: ICD-10-PCS | Mod: S$GLB,,, | Performed by: FAMILY MEDICINE

## 2022-10-07 PROCEDURE — 90694 FLU VACCINE - QUADRIVALENT - ADJUVANTED: ICD-10-PCS | Mod: S$GLB,,, | Performed by: FAMILY MEDICINE

## 2022-10-07 PROCEDURE — G0008 ADMIN INFLUENZA VIRUS VAC: HCPCS | Mod: S$GLB,,, | Performed by: FAMILY MEDICINE

## 2022-10-07 PROCEDURE — 1159F PR MEDICATION LIST DOCUMENTED IN MEDICAL RECORD: ICD-10-PCS | Mod: CPTII,S$GLB,, | Performed by: FAMILY MEDICINE

## 2022-10-07 PROCEDURE — 1160F PR REVIEW ALL MEDS BY PRESCRIBER/CLIN PHARMACIST DOCUMENTED: ICD-10-PCS | Mod: CPTII,S$GLB,, | Performed by: FAMILY MEDICINE

## 2022-10-07 PROCEDURE — 99999 PR PBB SHADOW E&M-EST. PATIENT-LVL III: ICD-10-PCS | Mod: PBBFAC,,, | Performed by: FAMILY MEDICINE

## 2022-10-07 PROCEDURE — 90694 VACC AIIV4 NO PRSRV 0.5ML IM: CPT | Mod: S$GLB,,, | Performed by: FAMILY MEDICINE

## 2022-10-07 PROCEDURE — 3075F SYST BP GE 130 - 139MM HG: CPT | Mod: CPTII,S$GLB,, | Performed by: FAMILY MEDICINE

## 2022-10-07 PROCEDURE — 99499 UNLISTED E&M SERVICE: CPT | Mod: S$PBB,,, | Performed by: FAMILY MEDICINE

## 2022-10-07 PROCEDURE — 99214 OFFICE O/P EST MOD 30 MIN: CPT | Mod: 25,S$GLB,, | Performed by: FAMILY MEDICINE

## 2022-10-07 PROCEDURE — 3078F DIAST BP <80 MM HG: CPT | Mod: CPTII,S$GLB,, | Performed by: FAMILY MEDICINE

## 2022-10-07 PROCEDURE — 99499 RISK ADDL DX/OHS AUDIT: ICD-10-PCS | Mod: S$PBB,,, | Performed by: FAMILY MEDICINE

## 2022-10-07 PROCEDURE — 99214 PR OFFICE/OUTPT VISIT, EST, LEVL IV, 30-39 MIN: ICD-10-PCS | Mod: 25,S$GLB,, | Performed by: FAMILY MEDICINE

## 2022-10-07 PROCEDURE — 3075F PR MOST RECENT SYSTOLIC BLOOD PRESS GE 130-139MM HG: ICD-10-PCS | Mod: CPTII,S$GLB,, | Performed by: FAMILY MEDICINE

## 2022-10-07 PROCEDURE — 1160F RVW MEDS BY RX/DR IN RCRD: CPT | Mod: CPTII,S$GLB,, | Performed by: FAMILY MEDICINE

## 2022-10-07 PROCEDURE — 99999 PR PBB SHADOW E&M-EST. PATIENT-LVL III: CPT | Mod: PBBFAC,,, | Performed by: FAMILY MEDICINE

## 2022-10-07 PROCEDURE — 1159F MED LIST DOCD IN RCRD: CPT | Mod: CPTII,S$GLB,, | Performed by: FAMILY MEDICINE

## 2022-10-07 NOTE — PROGRESS NOTES
Transitional Care Note  Subjective:      Patient ID: Myesha Quinones is a 82 y.o. female.  Chief Complaint: No chief complaint on file.    Family and/or Caretaker present at visit?  Yes.  Diagnostic tests reviewed/disposition: I have reviewed all completed as well as pending diagnostic tests at the time of discharge.  Disease/illness education: YES  Home health/community services discussion/referrals: Patient does not have home health established from hospital visit.  They do not need home health.  If needed, we will set up home health for the patient.   Establishment or re-establishment of referral orders for community resources: No other necessary community resources.   Discussion with other health care providers: No discussion with other health care providers necessary.   82 YR OLD PLEASANT FEMALE WITH DM II, CKD III, CHF W/GERALDINE, COPD, DEPRESSION AND OTHER CO MORBIDITIES PRESENTS TODAY FOR 3 month follow up. She is in CKD 4 and following nephrology. They are in talks for dialysis.    DM II - CONTROLLED - RECENT A1C WENT UP SINCE SHE TOOK STEROIDS IN HOSPITAL - BUT SHE FOLLOWING ENDO AND ON INSULIN and trulicity -     HGBA1C                   5.5                 09/29/2022                CHF WITH NORMAL EF - FOLLOWS CARDIOLOGY - ON ASA    HTN - CONTROLLED    CKD III - FOLLOWS NEPHROLOGY - NO NEW ISSUES       HISTORY AS BELOW - REVIEWED IN DETAIL        Diabetes  She presents for her follow-up diabetic visit. She has type 2 diabetes mellitus. Her disease course has been stable. There are no hypoglycemic associated symptoms. Pertinent negatives for hypoglycemia include no confusion, dizziness, headaches, nervousness/anxiousness, pallor, seizures, speech difficulty or tremors. Associated symptoms include fatigue and weakness. Pertinent negatives for diabetes include no chest pain, no polydipsia and no polyuria. There are no hypoglycemic complications. Symptoms are stable. Diabetic complications include heart  disease. Risk factors for coronary artery disease include diabetes mellitus, hypertension, post-menopausal and sedentary lifestyle. Current diabetic treatment includes insulin injections and oral agent (monotherapy). She is compliant with treatment all of the time. She rarely participates in exercise. An ACE inhibitor/angiotensin II receptor blocker is being taken. She does not see a podiatrist.Eye exam is current.   Review of Systems   Constitutional:  Positive for fatigue. Negative for activity change, diaphoresis and unexpected weight change.   HENT: Negative.  Negative for congestion, ear discharge, hearing loss, rhinorrhea, sore throat and voice change.    Eyes: Negative.  Negative for pain, discharge and visual disturbance.   Respiratory: Negative.  Negative for chest tightness, shortness of breath and wheezing.    Cardiovascular: Negative.  Negative for chest pain.   Gastrointestinal: Negative.  Negative for abdominal distention, anal bleeding, constipation and nausea.   Endocrine: Negative.  Negative for cold intolerance, polydipsia and polyuria.   Genitourinary: Negative.  Negative for decreased urine volume, difficulty urinating, dysuria, frequency, menstrual problem and vaginal pain.   Musculoskeletal: Negative.  Negative for arthralgias, gait problem and myalgias.   Skin: Negative.  Negative for color change, pallor and wound.   Allergic/Immunologic: Negative.  Negative for environmental allergies and immunocompromised state.   Neurological:  Positive for weakness. Negative for dizziness, tremors, seizures, speech difficulty and headaches.   Hematological: Negative.  Negative for adenopathy. Does not bruise/bleed easily.   Psychiatric/Behavioral: Negative.  Negative for agitation, confusion, decreased concentration, hallucinations, self-injury and suicidal ideas. The patient is not nervous/anxious.        PMH/PSH/FH/SH/MED/ALLERGY reviewed    Objective:       Vitals:    10/07/22 0844   BP: 130/65    Pulse: 75   Temp: 98 °F (36.7 °C)       Physical Exam  Constitutional:       General: She is not in acute distress.     Appearance: She is well-developed. She is not diaphoretic.   HENT:      Head: Normocephalic and atraumatic.      Right Ear: External ear normal.      Left Ear: External ear normal.      Nose: Nose normal.      Mouth/Throat:      Pharynx: No oropharyngeal exudate.   Eyes:      General: No scleral icterus.        Right eye: No discharge.         Left eye: No discharge.      Conjunctiva/sclera: Conjunctivae normal.      Pupils: Pupils are equal, round, and reactive to light.   Neck:      Thyroid: No thyromegaly.      Vascular: No JVD.      Trachea: No tracheal deviation.   Cardiovascular:      Rate and Rhythm: Normal rate and regular rhythm.      Heart sounds: Normal heart sounds. No murmur heard.    No friction rub. No gallop.   Pulmonary:      Effort: Pulmonary effort is normal.      Breath sounds: Normal breath sounds. No stridor. No wheezing or rales.   Chest:      Chest wall: No tenderness.   Abdominal:      General: Bowel sounds are normal. There is no distension.      Palpations: Abdomen is soft. There is no mass.      Tenderness: There is no abdominal tenderness. There is no guarding or rebound.      Hernia: No hernia is present.   Musculoskeletal:         General: No tenderness. Normal range of motion.      Cervical back: Normal range of motion and neck supple.   Lymphadenopathy:      Cervical: No cervical adenopathy.   Skin:     General: Skin is warm and dry.      Coloration: Skin is not pale.      Findings: No erythema or rash.   Neurological:      Mental Status: She is alert and oriented to person, place, and time.      Cranial Nerves: No cranial nerve deficit.      Motor: No abnormal muscle tone.      Coordination: Coordination normal.      Deep Tendon Reflexes: Reflexes are normal and symmetric. Reflexes normal.   Psychiatric:         Behavior: Behavior normal.         Thought Content:  Thought content normal.         Judgment: Judgment normal.       Assessment:       1. Diabetic polyneuropathy associated with type 2 diabetes mellitus    2. Chronic combined systolic and diastolic heart failure    3. Hyperlipidemia associated with type 2 diabetes mellitus    4. Hypertension associated with diabetes    5. CKD (chronic kidney disease) stage 4, GFR 15-29 ml/min    6. Type 2 diabetes mellitus with stage 3b chronic kidney disease, with long-term current use of insulin    7. Microalbuminuria due to type 2 diabetes mellitus    8. Current mild episode of major depressive disorder without prior episode    9. Immunization due        Plan:             Diagnoses and all orders for this visit:    Diabetic polyneuropathy associated with type 2 diabetes mellitus    Chronic combined systolic and diastolic heart failure    Hyperlipidemia associated with type 2 diabetes mellitus    Hypertension associated with diabetes    CKD (chronic kidney disease) stage 4, GFR 15-29 ml/min    Type 2 diabetes mellitus with stage 3b chronic kidney disease, with long-term current use of insulin    Microalbuminuria due to type 2 diabetes mellitus    Current mild episode of major depressive disorder without prior episode    Immunization due  -     Influenza (FLUAD) - Quadrivalent (Adjuvanted) *Preferred* (65+) (PF)        DM II  -controlled    HTN  -controlled   -MONITOR    CKD STAGE 4  -FOLLOW NEPHROLOGY  -ER AND NEPHRO PRECAUTIONS GIVEN    Depression  -controlled    COPD  -stable    Spent adequate time in obtaining history and explaining differentials    25 minutes spent during this visit of which greater than 50% devoted to face-face counseling and coordination of care regarding diagnosis and management plan    Follow up in about 3 months (around 1/7/2023), or if symptoms worsen or fail to improve.

## 2022-10-19 ENCOUNTER — TELEPHONE (OUTPATIENT)
Dept: UROGYNECOLOGY | Facility: CLINIC | Age: 82
End: 2022-10-19
Payer: MEDICARE

## 2022-10-19 DIAGNOSIS — Z46.89 ENCOUNTER FOR FITTING AND ADJUSTMENT OF PESSARY: ICD-10-CM

## 2022-10-19 DIAGNOSIS — R33.9 URINARY RETENTION: ICD-10-CM

## 2022-10-19 DIAGNOSIS — N95.2 POSTMENOPAUSE ATROPHIC VAGINITIS: Primary | ICD-10-CM

## 2022-10-19 RX ORDER — ESTRADIOL 0.1 MG/G
1 CREAM VAGINAL EVERY OTHER DAY
Qty: 42.5 G | Refills: 11 | Status: SHIPPED | OUTPATIENT
Start: 2022-10-19 | End: 2023-12-04

## 2022-10-19 NOTE — TELEPHONE ENCOUNTER
----- Message from Justina Morgan sent at 10/19/2022  1:41 PM CDT -----  Regarding: REFILL REQUEST  Name of Who is Calling:Betsy Bowen           What is the request in detail: patient daughter is calling in reference to vaginal cream refill           Can the clinic reply by [facility]: NO           What Number to Call Back if not in [facility]:[phone]

## 2022-10-20 ENCOUNTER — PROCEDURE VISIT (OUTPATIENT)
Dept: DERMATOLOGY | Facility: CLINIC | Age: 82
End: 2022-10-20
Payer: MEDICARE

## 2022-10-20 VITALS
BODY MASS INDEX: 24.48 KG/M2 | WEIGHT: 133 LBS | SYSTOLIC BLOOD PRESSURE: 151 MMHG | HEIGHT: 62 IN | DIASTOLIC BLOOD PRESSURE: 62 MMHG | HEART RATE: 77 BPM

## 2022-10-20 DIAGNOSIS — C44.01 BASAL CELL CARCINOMA, LIP: Primary | ICD-10-CM

## 2022-10-20 PROCEDURE — 13151 CMPLX RPR E/N/E/L 1.1-2.5 CM: CPT | Mod: 51,S$GLB,, | Performed by: DERMATOLOGY

## 2022-10-20 PROCEDURE — 13151 PR RECMPL WND LID,NOS,EAR 1.1-2.5 CM: ICD-10-PCS | Mod: 51,S$GLB,, | Performed by: DERMATOLOGY

## 2022-10-20 PROCEDURE — 17311: ICD-10-PCS | Mod: S$GLB,,, | Performed by: DERMATOLOGY

## 2022-10-20 PROCEDURE — 17311 MOHS 1 STAGE H/N/HF/G: CPT | Mod: S$GLB,,, | Performed by: DERMATOLOGY

## 2022-10-20 PROCEDURE — 99499 UNLISTED E&M SERVICE: CPT | Mod: S$GLB,,, | Performed by: DERMATOLOGY

## 2022-10-20 PROCEDURE — 99499 NO LOS: ICD-10-PCS | Mod: S$GLB,,, | Performed by: DERMATOLOGY

## 2022-10-20 NOTE — PROGRESS NOTES
PROCEDURE: Mohs' Micrographic Surgery    INDICATION: Location in mask areas of face including central face, nose, eyelids, eyebrows, lips, chin, preauricular, temple, and ear. Biopsy-proven skin cancer of cosmetically and functionally important areas, including head, neck, genital, hand, foot, or areas known for having difficulty in healing, such as the lower anterior legs. Tumor with ill-defined borders.    REFERRING PROVIDER: Leonides Fuentes MD     CASE NUMBER:     ANESTHETIC: 3 cc 0.5% Lidocaine with Epi 1:200,000 mixed 1:1 with 0.5% Bupivacaine    SURGICAL PREP: Hibiclens    SURGEON: Diaz Roche MD    ASSISTANTS: Inez Gray PA-C and Delmy Bauer MA    PREOPERATIVE DIAGNOSIS: basal cell carcinoma- nodular    POSTOPERATIVE DIAGNOSIS: basal cell carcinoma- nodular    PATHOLOGIC DIAGNOSIS: basal cell carcinoma- nodular    HISTOLOGY OF SPECIMENS IN FIRST STAGE:   Debulking tumor confirms nodular basal cell carcinoma.  Tumor Type:  No tumor seen.    STAGES OF MOHS' SURGERY PERFORMED: 1    TUMOR-FREE PLANE ACHIEVED: Yes    HEMOSTASIS: 4-0 vicryl suture ties and thermal cautery     SPECIMENS: 2    LOCATION: right alar groove (cutaneous upper lip at alar base). Location verified with Dr. Fuentes's clinical photograph. Patient also verified location with hand held mirror.    INITIAL LESION SIZE: 0.3 x 0.5 cm    FINAL DEFECT SIZE: 0.6 x 1.0 cm    WOUND REPAIR/DISPOSITION: The patient tolerated Mohs' Micrographic Surgery for a basal cell carcinoma very well. When the tumor was completely removed, a repair of the surgical defect was undertaken.          PROCEDURE: Complex Linear Repair    INDICATION: Status post Mohs' Micrographic Surgery for basal cell carcinoma.    CASE NUMBER:     SURGEON: Diaz Roche MD    ASSISTANTS: Inez Gray PA-C and Shawna Wen Surg Farhad    ANESTHETIC: 1.5 cc 2% Lidocaine with Epinephrine 1:100,000    SURGICAL PREP: Hibiclens, prepped by Shawna Wen Surg Tech    LOCATION: right  "alar groove (cutaneous upper lip at alar base)    DEFECT SIZE: 0.6 x 1.0 cm    WOUND REPAIR/DISPOSITION:  After the patient's carcinoma had been completely removed with Mohs' Micrographic Surgery, a repair of the surgical defect was undertaken. The patient was returned to the operating suite where the area of right alar groove and upper lip were prepped, draped, and anesthetized in the usual sterile fashion. The wound was widely undermined in all directions. The wound was undermined to a distance at least the maximum width of the defect as measured perpendicular to the closure line along at least one entire edge of the defect, in this case 1 cm. Then, thermal cautery was used to obtain meticulous hemostasis. 5-0 Vicryl buried vertical mattress sutures were placed into the subcutaneous and dermal plane to close the wound and giles the cutaneous wound edge. Bilateral dog ears were identified and were removed by a standard Burow's triangle technique. The cutaneous wound edges were closed using interrupted 5-0 Prolene suture.    The patient tolerated the procedure well.    The area was cleaned and dressed appropriately and the patient was given wound care instructions, as well as appointment for follow-up evaluation and suture removal in 7 days.    LENGTH OF REPAIR: 2 cm    Vitals:    10/20/22 0931 10/20/22 1156   BP: (!) 142/57 (!) 151/62   BP Location: Right arm    Patient Position: Sitting    BP Method: Medium (Automatic)    Pulse: 73 77   Weight: 60.3 kg (133 lb)    Height: 5' 2" (1.575 m)          "

## 2022-10-25 ENCOUNTER — TELEPHONE (OUTPATIENT)
Dept: DERMATOLOGY | Facility: CLINIC | Age: 82
End: 2022-10-25
Payer: MEDICARE

## 2022-10-25 NOTE — TELEPHONE ENCOUNTER
----- Message from Zoraida Lu sent at 10/25/2022  9:19 AM CDT -----  Regarding: Post Op 10/27/2022  Contact: Betsy([phone]  Caller(Betsy)  Daughter is requesting a call back regarding Rescheduling appt for Friday please Call to discuss further .

## 2022-10-25 NOTE — TELEPHONE ENCOUNTER
Spoke to pt's daughter; rescheduled from 11:10 to 2:20 because of conflicts with transportation. Pt's daughter confirmed new time.

## 2022-10-27 ENCOUNTER — OFFICE VISIT (OUTPATIENT)
Dept: DERMATOLOGY | Facility: CLINIC | Age: 82
End: 2022-10-27
Payer: MEDICARE

## 2022-10-27 ENCOUNTER — PATIENT MESSAGE (OUTPATIENT)
Dept: ENDOCRINOLOGY | Facility: CLINIC | Age: 82
End: 2022-10-27
Payer: MEDICARE

## 2022-10-27 ENCOUNTER — TELEPHONE (OUTPATIENT)
Dept: DERMATOLOGY | Facility: CLINIC | Age: 82
End: 2022-10-27
Payer: MEDICARE

## 2022-10-27 ENCOUNTER — LAB VISIT (OUTPATIENT)
Dept: LAB | Facility: HOSPITAL | Age: 82
End: 2022-10-27
Attending: INTERNAL MEDICINE
Payer: MEDICARE

## 2022-10-27 DIAGNOSIS — D50.8 OTHER IRON DEFICIENCY ANEMIA: ICD-10-CM

## 2022-10-27 DIAGNOSIS — E11.3213 CONTROLLED TYPE 2 DIABETES MELLITUS WITH BOTH EYES AFFECTED BY MILD NONPROLIFERATIVE RETINOPATHY AND MACULAR EDEMA, WITH LONG-TERM CURRENT USE OF INSULIN: ICD-10-CM

## 2022-10-27 DIAGNOSIS — E11.42 DIABETIC POLYNEUROPATHY ASSOCIATED WITH TYPE 2 DIABETES MELLITUS: ICD-10-CM

## 2022-10-27 DIAGNOSIS — Z09 POSTOP CHECK: Primary | ICD-10-CM

## 2022-10-27 DIAGNOSIS — Z79.4 CONTROLLED TYPE 2 DIABETES MELLITUS WITH BOTH EYES AFFECTED BY MILD NONPROLIFERATIVE RETINOPATHY AND MACULAR EDEMA, WITH LONG-TERM CURRENT USE OF INSULIN: ICD-10-CM

## 2022-10-27 DIAGNOSIS — N18.4 CHRONIC KIDNEY DISEASE, STAGE IV (SEVERE): ICD-10-CM

## 2022-10-27 LAB
ALBUMIN SERPL BCP-MCNC: 3.3 G/DL (ref 3.5–5.2)
ANION GAP SERPL CALC-SCNC: 15 MMOL/L (ref 8–16)
BASOPHILS # BLD AUTO: 0.03 K/UL (ref 0–0.2)
BASOPHILS NFR BLD: 0.2 % (ref 0–1.9)
BUN SERPL-MCNC: 65 MG/DL (ref 8–23)
CALCIUM SERPL-MCNC: 8.7 MG/DL (ref 8.7–10.5)
CHLORIDE SERPL-SCNC: 101 MMOL/L (ref 95–110)
CO2 SERPL-SCNC: 22 MMOL/L (ref 23–29)
CREAT SERPL-MCNC: 2.8 MG/DL (ref 0.5–1.4)
DIFFERENTIAL METHOD: ABNORMAL
EOSINOPHIL # BLD AUTO: 0.1 K/UL (ref 0–0.5)
EOSINOPHIL NFR BLD: 1 % (ref 0–8)
ERYTHROCYTE [DISTWIDTH] IN BLOOD BY AUTOMATED COUNT: 13.6 % (ref 11.5–14.5)
EST. GFR  (NO RACE VARIABLE): 16 ML/MIN/1.73 M^2
ESTIMATED AVG GLUCOSE: 114 MG/DL (ref 68–131)
FERRITIN SERPL-MCNC: 473 NG/ML (ref 20–300)
GLUCOSE SERPL-MCNC: 123 MG/DL (ref 70–110)
HBA1C MFR BLD: 5.6 % (ref 4–5.6)
HCT VFR BLD AUTO: 30.4 % (ref 37–48.5)
HGB BLD-MCNC: 9.6 G/DL (ref 12–16)
IMM GRANULOCYTES # BLD AUTO: 0.13 K/UL (ref 0–0.04)
IMM GRANULOCYTES NFR BLD AUTO: 1 % (ref 0–0.5)
IRON SERPL-MCNC: 83 UG/DL (ref 30–160)
LYMPHOCYTES # BLD AUTO: 1.5 K/UL (ref 1–4.8)
LYMPHOCYTES NFR BLD: 11.1 % (ref 18–48)
MCH RBC QN AUTO: 30.5 PG (ref 27–31)
MCHC RBC AUTO-ENTMCNC: 31.6 G/DL (ref 32–36)
MCV RBC AUTO: 97 FL (ref 82–98)
MONOCYTES # BLD AUTO: 0.8 K/UL (ref 0.3–1)
MONOCYTES NFR BLD: 6.2 % (ref 4–15)
NEUTROPHILS # BLD AUTO: 10.8 K/UL (ref 1.8–7.7)
NEUTROPHILS NFR BLD: 80.5 % (ref 38–73)
NRBC BLD-RTO: 0 /100 WBC
PHOSPHATE SERPL-MCNC: 3.6 MG/DL (ref 2.7–4.5)
PLATELET # BLD AUTO: 252 K/UL (ref 150–450)
PMV BLD AUTO: 10.4 FL (ref 9.2–12.9)
POTASSIUM SERPL-SCNC: 4.2 MMOL/L (ref 3.5–5.1)
RBC # BLD AUTO: 3.15 M/UL (ref 4–5.4)
SATURATED IRON: 26 % (ref 20–50)
SODIUM SERPL-SCNC: 138 MMOL/L (ref 136–145)
TOTAL IRON BINDING CAPACITY: 318 UG/DL (ref 250–450)
TRANSFERRIN SERPL-MCNC: 215 MG/DL (ref 200–375)
URATE SERPL-MCNC: 8.8 MG/DL (ref 2.4–5.7)
WBC # BLD AUTO: 13.39 K/UL (ref 3.9–12.7)

## 2022-10-27 PROCEDURE — 84550 ASSAY OF BLOOD/URIC ACID: CPT | Performed by: INTERNAL MEDICINE

## 2022-10-27 PROCEDURE — 36415 COLL VENOUS BLD VENIPUNCTURE: CPT | Performed by: INTERNAL MEDICINE

## 2022-10-27 PROCEDURE — 85025 COMPLETE CBC W/AUTO DIFF WBC: CPT | Performed by: INTERNAL MEDICINE

## 2022-10-27 PROCEDURE — 1159F MED LIST DOCD IN RCRD: CPT | Mod: CPTII,S$GLB,, | Performed by: DERMATOLOGY

## 2022-10-27 PROCEDURE — 80069 RENAL FUNCTION PANEL: CPT | Performed by: INTERNAL MEDICINE

## 2022-10-27 PROCEDURE — 1126F PR PAIN SEVERITY QUANTIFIED, NO PAIN PRESENT: ICD-10-PCS | Mod: CPTII,S$GLB,, | Performed by: DERMATOLOGY

## 2022-10-27 PROCEDURE — 1126F AMNT PAIN NOTED NONE PRSNT: CPT | Mod: CPTII,S$GLB,, | Performed by: DERMATOLOGY

## 2022-10-27 PROCEDURE — 3288F PR FALLS RISK ASSESSMENT DOCUMENTED: ICD-10-PCS | Mod: CPTII,S$GLB,, | Performed by: DERMATOLOGY

## 2022-10-27 PROCEDURE — 99024 POSTOP FOLLOW-UP VISIT: CPT | Mod: S$GLB,,, | Performed by: DERMATOLOGY

## 2022-10-27 PROCEDURE — 1101F PR PT FALLS ASSESS DOC 0-1 FALLS W/OUT INJ PAST YR: ICD-10-PCS | Mod: CPTII,S$GLB,, | Performed by: DERMATOLOGY

## 2022-10-27 PROCEDURE — 1159F PR MEDICATION LIST DOCUMENTED IN MEDICAL RECORD: ICD-10-PCS | Mod: CPTII,S$GLB,, | Performed by: DERMATOLOGY

## 2022-10-27 PROCEDURE — 3288F FALL RISK ASSESSMENT DOCD: CPT | Mod: CPTII,S$GLB,, | Performed by: DERMATOLOGY

## 2022-10-27 PROCEDURE — 83036 HEMOGLOBIN GLYCOSYLATED A1C: CPT | Performed by: INTERNAL MEDICINE

## 2022-10-27 PROCEDURE — 99999 PR PBB SHADOW E&M-EST. PATIENT-LVL III: CPT | Mod: PBBFAC,,, | Performed by: DERMATOLOGY

## 2022-10-27 PROCEDURE — 84466 ASSAY OF TRANSFERRIN: CPT | Performed by: INTERNAL MEDICINE

## 2022-10-27 PROCEDURE — 99024 PR POST-OP FOLLOW-UP VISIT: ICD-10-PCS | Mod: S$GLB,,, | Performed by: DERMATOLOGY

## 2022-10-27 PROCEDURE — 82728 ASSAY OF FERRITIN: CPT | Performed by: INTERNAL MEDICINE

## 2022-10-27 PROCEDURE — 1101F PT FALLS ASSESS-DOCD LE1/YR: CPT | Mod: CPTII,S$GLB,, | Performed by: DERMATOLOGY

## 2022-10-27 PROCEDURE — 99999 PR PBB SHADOW E&M-EST. PATIENT-LVL III: ICD-10-PCS | Mod: PBBFAC,,, | Performed by: DERMATOLOGY

## 2022-10-27 RX ORDER — LANCETS
EACH MISCELLANEOUS
Qty: 400 EACH | Refills: 6 | Status: SHIPPED | OUTPATIENT
Start: 2022-10-27

## 2022-10-27 NOTE — TELEPHONE ENCOUNTER
----- Message from Anna Burgos MA sent at 10/27/2022 10:09 AM CDT -----  Pt's daughter needs to know if she can still come in for 11:10am appt today. Please reach out to daughter at [phone]. Pt is coming from [facility].

## 2022-10-27 NOTE — PROGRESS NOTES
82 y.o. female patient is here for suture removal following Mohs' surgery.    Patient reports no problems.    WOUND PE:  The R alar groove sutures intact. Wound healing well. Good skin edges. No signs or symptoms of infection.    IMPRESSION:  Healing operative site from Mohs' surgery, BCC R alar groove s/p Mohs with CLC, postop day #7.    PLAN:  Sutures removed today by  Mimi Nguyen MA .   Steri-strips applied.  Keep moist with Aquaphor.    RTC:  In 3-6 months with gen derm for skin check or sooner if new concern arises.

## 2022-10-27 NOTE — TELEPHONE ENCOUNTER
Spoke to pt's daughter; they changed their minds and want to come at 11:10am for s/r today. Assured them that was OK. Unable to reschedule appt in Epic (completed status?) but will edit appt notes to see pt at 11:10.

## 2022-11-04 ENCOUNTER — CLINICAL SUPPORT (OUTPATIENT)
Dept: CARDIOLOGY | Facility: HOSPITAL | Age: 82
End: 2022-11-04
Payer: MEDICARE

## 2022-11-04 DIAGNOSIS — Z95.810 PRESENCE OF AUTOMATIC (IMPLANTABLE) CARDIAC DEFIBRILLATOR: ICD-10-CM

## 2022-11-04 PROCEDURE — 93295 CARDIAC DEVICE CHECK - REMOTE: ICD-10-PCS | Mod: ,,, | Performed by: INTERNAL MEDICINE

## 2022-11-04 PROCEDURE — 93296 REM INTERROG EVL PM/IDS: CPT | Performed by: INTERNAL MEDICINE

## 2022-11-04 PROCEDURE — 93295 DEV INTERROG REMOTE 1/2/MLT: CPT | Mod: ,,, | Performed by: INTERNAL MEDICINE

## 2022-11-07 NOTE — HPI
Patient is a 76yo female with a PMH of HTN, DM, nonischemic cardiomyopathy (AICD in place s/p CRDT), HFpEF (50%), COPD, CKD3 being admitted to observation for COPD exacerbation. She reports a 3 day history of progressively worsening shortness of breath and PELAYO. She notes mild left sided chest discomfort, which is intermittent in nature. This is not exertional and lasts seconds at a time with each episode. Additionally, she notes chronic cough, mild nausea, and urinary frequency. Her asthma/COPD is managed with Advair at baseline, she is also using an albuterol nebs 2-3 times daily since the onset of her symptoms. This has provided little improvement and she feels that her symptoms are related to her history of heart failure. She denies fever, chills, URI symptoms, active chest pain, vomiting, abdominal pain, diarrhea, peripheral edema or weakness.    In the ED, vitals stable. Intake labs unremarkable for acute process. Patient received duo nebs and IV solumedrol with improvement in breathing symptoms.     Detail Level: Detailed Photo Preface (Leave Blank If You Do Not Want): Photographs were obtained today

## 2022-11-18 ENCOUNTER — PATIENT MESSAGE (OUTPATIENT)
Dept: ENDOCRINOLOGY | Facility: CLINIC | Age: 82
End: 2022-11-18
Payer: MEDICARE

## 2022-11-18 ENCOUNTER — HOSPITAL ENCOUNTER (OUTPATIENT)
Dept: ENDOCRINOLOGY | Facility: CLINIC | Age: 82
Discharge: HOME | End: 2022-11-18
Attending: NURSE PRACTITIONER
Payer: MEDICARE

## 2022-11-18 ENCOUNTER — LAB VISIT (OUTPATIENT)
Dept: LAB | Facility: HOSPITAL | Age: 82
End: 2022-11-18
Payer: MEDICARE

## 2022-11-18 DIAGNOSIS — E11.3213 CONTROLLED TYPE 2 DIABETES MELLITUS WITH BOTH EYES AFFECTED BY MILD NONPROLIFERATIVE RETINOPATHY AND MACULAR EDEMA, WITH LONG-TERM CURRENT USE OF INSULIN: ICD-10-CM

## 2022-11-18 DIAGNOSIS — Z79.4 CONTROLLED TYPE 2 DIABETES MELLITUS WITH BOTH EYES AFFECTED BY MILD NONPROLIFERATIVE RETINOPATHY AND MACULAR EDEMA, WITH LONG-TERM CURRENT USE OF INSULIN: ICD-10-CM

## 2022-11-18 DIAGNOSIS — E04.2 NONTOXIC MULTINODULAR GOITER: ICD-10-CM

## 2022-11-18 LAB
ALBUMIN SERPL BCP-MCNC: 3.2 G/DL (ref 3.5–5.2)
ALP SERPL-CCNC: 119 U/L (ref 55–135)
ALT SERPL W/O P-5'-P-CCNC: 12 U/L (ref 10–44)
ANION GAP SERPL CALC-SCNC: 8 MMOL/L (ref 8–16)
AST SERPL-CCNC: 16 U/L (ref 10–40)
BILIRUB SERPL-MCNC: 0.3 MG/DL (ref 0.1–1)
BUN SERPL-MCNC: 45 MG/DL (ref 8–23)
CALCIUM SERPL-MCNC: 8.9 MG/DL (ref 8.7–10.5)
CHLORIDE SERPL-SCNC: 103 MMOL/L (ref 95–110)
CO2 SERPL-SCNC: 27 MMOL/L (ref 23–29)
CREAT SERPL-MCNC: 2.2 MG/DL (ref 0.5–1.4)
EST. GFR  (NO RACE VARIABLE): 21.7 ML/MIN/1.73 M^2
ESTIMATED AVG GLUCOSE: 111 MG/DL (ref 68–131)
GLUCOSE SERPL-MCNC: 112 MG/DL (ref 70–110)
HBA1C MFR BLD: 5.5 % (ref 4–5.6)
POTASSIUM SERPL-SCNC: 4.4 MMOL/L (ref 3.5–5.1)
PROT SERPL-MCNC: 6.6 G/DL (ref 6–8.4)
SODIUM SERPL-SCNC: 138 MMOL/L (ref 136–145)

## 2022-11-18 PROCEDURE — 76536 US SOFT TISSUE HEAD NECK THYROID: ICD-10-PCS | Mod: S$GLB,,, | Performed by: INTERNAL MEDICINE

## 2022-11-18 PROCEDURE — 83036 HEMOGLOBIN GLYCOSYLATED A1C: CPT | Mod: 59 | Performed by: NURSE PRACTITIONER

## 2022-11-18 PROCEDURE — 76536 US EXAM OF HEAD AND NECK: CPT | Mod: S$GLB,,, | Performed by: INTERNAL MEDICINE

## 2022-11-18 PROCEDURE — 82985 ASSAY OF GLYCATED PROTEIN: CPT | Performed by: NURSE PRACTITIONER

## 2022-11-18 PROCEDURE — 36415 COLL VENOUS BLD VENIPUNCTURE: CPT | Performed by: NURSE PRACTITIONER

## 2022-11-18 PROCEDURE — 80053 COMPREHEN METABOLIC PANEL: CPT | Performed by: NURSE PRACTITIONER

## 2022-11-18 NOTE — TELEPHONE ENCOUNTER
COMPARISON:  Neck ultrasound dated 11/19/2020.  Our records indicate a benign FNA of the right lobe nodule in 2014 and the left lobe nodule in 2016.  When compared to the prior study the right lobe dominant nodule has grown significantly.     Impression:     1.) Thyroid gland is normal in size with heterogeneous echotexture and normal vascularity     2.) 2.7 x 2.5 x 2.2 cm solid, isoechoic nodule is seen in the right mid lobe     3.) 1.5 x 1.5 x 1.6 cm spongiform nodule is seen in the right inferior pole     4.) 1.2 x 0.8 x 1.1 cm solid, isoechoic nodule is seen in the left superior pole     5.) 1.0 x 1.0 x 1.1 cm hypoechoic nodule is seen posterior to the left mid lobe that could represent a parathyroid adenoma     RECOMMENDATIONS:  Consider FNA of right lobe dominant nodule given growth over time.  Clinical correlation advised for left mid lobe nodule that could represent a parathyroid adenoma.  If monitoring chosen would repeat ultrasound in 1 year.

## 2022-11-21 LAB — FRUCTOSAMINE SERPL-SCNC: 273 UMOL /L

## 2022-11-23 ENCOUNTER — TELEPHONE (OUTPATIENT)
Dept: ENDOCRINOLOGY | Facility: CLINIC | Age: 82
End: 2022-11-23
Payer: MEDICARE

## 2022-11-28 ENCOUNTER — PATIENT MESSAGE (OUTPATIENT)
Dept: ENDOCRINOLOGY | Facility: CLINIC | Age: 82
End: 2022-11-28
Payer: MEDICARE

## 2022-11-28 DIAGNOSIS — E11.42 DIABETIC POLYNEUROPATHY ASSOCIATED WITH TYPE 2 DIABETES MELLITUS: ICD-10-CM

## 2022-11-28 RX ORDER — PEN NEEDLE, DIABETIC 30 GX3/16"
NEEDLE, DISPOSABLE MISCELLANEOUS
Qty: 400 EACH | Refills: 3 | Status: SHIPPED | OUTPATIENT
Start: 2022-11-28 | End: 2024-01-12 | Stop reason: SDUPTHER

## 2022-11-29 ENCOUNTER — CLINICAL SUPPORT (OUTPATIENT)
Dept: OTOLARYNGOLOGY | Facility: CLINIC | Age: 82
End: 2022-11-29
Payer: MEDICARE

## 2022-11-29 DIAGNOSIS — Z46.1 ENCOUNTER FOR FITTING AND ADJUSTMENT OF HEARING AID OF BOTH EARS: Primary | ICD-10-CM

## 2022-11-29 DIAGNOSIS — H90.A32 MIXED CONDUCTIVE AND SENSORINEURAL HEARING LOSS OF LEFT EAR WITH RESTRICTED HEARING OF RIGHT EAR: ICD-10-CM

## 2022-11-29 DIAGNOSIS — R29.2 ABNORMAL ACOUSTIC REFLEX: ICD-10-CM

## 2022-11-29 DIAGNOSIS — Z82.2 FAMILY HISTORY OF HEARING LOSS: ICD-10-CM

## 2022-11-29 DIAGNOSIS — H90.A21 SENSORINEURAL HEARING LOSS (SNHL) OF RIGHT EAR WITH RESTRICTED HEARING OF LEFT EAR: ICD-10-CM

## 2022-11-29 DIAGNOSIS — H93.299 REDUCED SPEECH DISCRIMINATION: ICD-10-CM

## 2022-11-29 DIAGNOSIS — H90.A32 MIXED CONDUCTIVE AND SENSORINEURAL HEARING LOSS OF LEFT EAR WITH RESTRICTED HEARING OF RIGHT EAR: Primary | ICD-10-CM

## 2022-11-29 DIAGNOSIS — Z97.4 WEARS HEARING AID IN BOTH EARS: ICD-10-CM

## 2022-11-29 PROCEDURE — 99499 UNLISTED E&M SERVICE: CPT | Mod: ,,, | Performed by: AUDIOLOGIST-HEARING AID FITTER

## 2022-11-29 PROCEDURE — 92557 PR COMPREHENSIVE HEARING TEST: ICD-10-PCS | Mod: S$GLB,,, | Performed by: AUDIOLOGIST-HEARING AID FITTER

## 2022-11-29 PROCEDURE — 99499 NO LOS: ICD-10-PCS | Mod: ,,, | Performed by: AUDIOLOGIST-HEARING AID FITTER

## 2022-11-29 PROCEDURE — 92550 TYMPANOMETRY & REFLEX THRESH: CPT | Mod: S$GLB,,, | Performed by: AUDIOLOGIST-HEARING AID FITTER

## 2022-11-29 PROCEDURE — 92550 PR TYMPANOMETRY AND REFLEX THRESHOLD MEASUREMENTS: ICD-10-PCS | Mod: S$GLB,,, | Performed by: AUDIOLOGIST-HEARING AID FITTER

## 2022-11-29 PROCEDURE — 92557 COMPREHENSIVE HEARING TEST: CPT | Mod: S$GLB,,, | Performed by: AUDIOLOGIST-HEARING AID FITTER

## 2022-11-29 NOTE — PROGRESS NOTES
Susannah Carter, CCC-A  Audiologist - [facility] [address] [place] [address]  [email or web address]  [phone]    Patient: Myesha Quinones   MRN: [id number]  [address]  Home Phone [phone]   Work Phone [phone]   Mobile [phone]   : 1940  PELAYO: 2022      HEARING AID FOLLOW-UP      Myesha Quinones is here today to  her Left hearing aid and mold from repair.  Connected to 2022 session and saved without any changes to settings, then re-paired devices to her phone.  Completed audiogram and recommended follow-up with Dr. Miranda.  Myesha Quinones verbalized understanding and satisfaction with today's visit.  She will call if service is needed.    [employer]  #[id number] (R)  #[id number] (L)  Warranty expires: 10/24/2024    _______________________________  Susannah Carter, WINDY-A  Audiologist

## 2022-12-01 ENCOUNTER — OFFICE VISIT (OUTPATIENT)
Dept: OPHTHALMOLOGY | Facility: CLINIC | Age: 82
End: 2022-12-01
Payer: MEDICARE

## 2022-12-01 DIAGNOSIS — H35.033 HYPERTENSIVE RETINOPATHY, BILATERAL: ICD-10-CM

## 2022-12-01 DIAGNOSIS — E11.3213 CONTROLLED TYPE 2 DIABETES MELLITUS WITH BOTH EYES AFFECTED BY MILD NONPROLIFERATIVE RETINOPATHY AND MACULAR EDEMA, WITH LONG-TERM CURRENT USE OF INSULIN: ICD-10-CM

## 2022-12-01 DIAGNOSIS — Z79.4 CONTROLLED TYPE 2 DIABETES MELLITUS WITH BOTH EYES AFFECTED BY MILD NONPROLIFERATIVE RETINOPATHY AND MACULAR EDEMA, WITH LONG-TERM CURRENT USE OF INSULIN: ICD-10-CM

## 2022-12-01 DIAGNOSIS — H35.012 RETINAL MACROANEURYSM OF LEFT EYE: Primary | ICD-10-CM

## 2022-12-01 PROCEDURE — 92014 PR EYE EXAM, EST PATIENT,COMPREHESV: ICD-10-PCS | Mod: S$GLB,,, | Performed by: OPHTHALMOLOGY

## 2022-12-01 PROCEDURE — 99999 PR PBB SHADOW E&M-EST. PATIENT-LVL IV: CPT | Mod: PBBFAC,,, | Performed by: OPHTHALMOLOGY

## 2022-12-01 PROCEDURE — 92134 POSTERIOR SEGMENT OCT RETINA (OCULAR COHERENCE TOMOGRAPHY)-BOTH EYES: ICD-10-PCS | Mod: S$GLB,,, | Performed by: OPHTHALMOLOGY

## 2022-12-01 PROCEDURE — 1160F RVW MEDS BY RX/DR IN RCRD: CPT | Mod: CPTII,S$GLB,, | Performed by: OPHTHALMOLOGY

## 2022-12-01 PROCEDURE — 92014 COMPRE OPH EXAM EST PT 1/>: CPT | Mod: S$GLB,,, | Performed by: OPHTHALMOLOGY

## 2022-12-01 PROCEDURE — 1101F PT FALLS ASSESS-DOCD LE1/YR: CPT | Mod: CPTII,S$GLB,, | Performed by: OPHTHALMOLOGY

## 2022-12-01 PROCEDURE — 92201 PR OPHTHALMOSCOPY, EXT, W/RET DRAW/SCLERAL DEPR, I&R, UNI/BI: ICD-10-PCS | Mod: S$GLB,,, | Performed by: OPHTHALMOLOGY

## 2022-12-01 PROCEDURE — 1101F PR PT FALLS ASSESS DOC 0-1 FALLS W/OUT INJ PAST YR: ICD-10-PCS | Mod: CPTII,S$GLB,, | Performed by: OPHTHALMOLOGY

## 2022-12-01 PROCEDURE — 1126F AMNT PAIN NOTED NONE PRSNT: CPT | Mod: CPTII,S$GLB,, | Performed by: OPHTHALMOLOGY

## 2022-12-01 PROCEDURE — 92201 OPSCPY EXTND RTA DRAW UNI/BI: CPT | Mod: S$GLB,,, | Performed by: OPHTHALMOLOGY

## 2022-12-01 PROCEDURE — 3288F FALL RISK ASSESSMENT DOCD: CPT | Mod: CPTII,S$GLB,, | Performed by: OPHTHALMOLOGY

## 2022-12-01 PROCEDURE — 92134 CPTRZ OPH DX IMG PST SGM RTA: CPT | Mod: S$GLB,,, | Performed by: OPHTHALMOLOGY

## 2022-12-01 PROCEDURE — 3288F PR FALLS RISK ASSESSMENT DOCUMENTED: ICD-10-PCS | Mod: CPTII,S$GLB,, | Performed by: OPHTHALMOLOGY

## 2022-12-01 PROCEDURE — 99999 PR PBB SHADOW E&M-EST. PATIENT-LVL IV: ICD-10-PCS | Mod: PBBFAC,,, | Performed by: OPHTHALMOLOGY

## 2022-12-01 PROCEDURE — 1160F PR REVIEW ALL MEDS BY PRESCRIBER/CLIN PHARMACIST DOCUMENTED: ICD-10-PCS | Mod: CPTII,S$GLB,, | Performed by: OPHTHALMOLOGY

## 2022-12-01 PROCEDURE — 1159F MED LIST DOCD IN RCRD: CPT | Mod: CPTII,S$GLB,, | Performed by: OPHTHALMOLOGY

## 2022-12-01 PROCEDURE — 1159F PR MEDICATION LIST DOCUMENTED IN MEDICAL RECORD: ICD-10-PCS | Mod: CPTII,S$GLB,, | Performed by: OPHTHALMOLOGY

## 2022-12-01 PROCEDURE — 1126F PR PAIN SEVERITY QUANTIFIED, NO PAIN PRESENT: ICD-10-PCS | Mod: CPTII,S$GLB,, | Performed by: OPHTHALMOLOGY

## 2022-12-01 NOTE — PROGRESS NOTES
HPI    DLS: 8/26/22    Pt here for 3 month OCT.  Pt states she feels VA OS has gotten worse since   last visit.  Pt denies flashes, floaters or eye pain OU.     S/p Avastin OS: 05/23/2022     Gtts: (+)Redeye gtts PRN OU, mostly after injections         OCT - OD trace parafoveal ME  OS - inferior fibrosis at TORSTEN site - improved    Prior FA - Foveal MA's OS   No NV of NP OU      Lost  to COVID complications 12/20    A/P    1. 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 DME OS  S/p Avastin OS x 5  S/p Ozurdex OS x 5 9/19  S/p Iluvien 9/19    Doing well, will need chronic steroid  - may need additional Iluvien OS soon  Watch OD for now    Can consider light focal/micropulse to parafoveal MA's OS if worsens      3. HTN Ret OU  BS/BP/chol control  5/22 TORSTEN OS with SRF and preretinal heme  S/p Avastin OS x 2    TORSTEN ruptured and involuted - no tx today    4. PCIOL OU  With small PCO      4 months OCT and dilate

## 2022-12-15 NOTE — PROGRESS NOTES
Susannah Carter, CCC-A  Audiologist - [facility] [address] [place] [address]  [email or web address]  [phone]    Patient: Myesha Quinones   MRN: [id number]  [address] [facility]   Home Phone [phone]   Work Phone [phone]   Mobile [phone]   : 1940  PELAYO: 2022      AUDIOLOGICAL EVALUATION      RECOMMENDATIONS:   It is recommended that Myesha Quinones:  Follow up medically with Dr. Miranda.  Continue wearing her binaural hearing aids to improve speech understanding.  Continue to receive audiological monitoring annually.  Use precaution and/or hearing protection in noisy environments.    If you should have any questions or concerns regarding the above information, please do not hesitate to contact me at [phone].      _______________________________  Susannah Carter, WINDY-A  Audiologist

## 2022-12-22 ENCOUNTER — PATIENT MESSAGE (OUTPATIENT)
Dept: CARDIOLOGY | Facility: CLINIC | Age: 82
End: 2022-12-22
Payer: MEDICARE

## 2023-01-06 ENCOUNTER — OFFICE VISIT (OUTPATIENT)
Dept: FAMILY MEDICINE | Facility: CLINIC | Age: 83
End: 2023-01-06
Attending: FAMILY MEDICINE
Payer: MEDICARE

## 2023-01-06 ENCOUNTER — LAB VISIT (OUTPATIENT)
Dept: LAB | Facility: HOSPITAL | Age: 83
End: 2023-01-06
Attending: INTERNAL MEDICINE
Payer: MEDICARE

## 2023-01-06 VITALS
OXYGEN SATURATION: 93 % | SYSTOLIC BLOOD PRESSURE: 110 MMHG | HEIGHT: 62 IN | HEART RATE: 58 BPM | TEMPERATURE: 98 F | WEIGHT: 125.69 LBS | BODY MASS INDEX: 23.13 KG/M2 | DIASTOLIC BLOOD PRESSURE: 70 MMHG

## 2023-01-06 DIAGNOSIS — I50.42 CHRONIC COMBINED SYSTOLIC AND DIASTOLIC HEART FAILURE: ICD-10-CM

## 2023-01-06 DIAGNOSIS — F32.0 CURRENT MILD EPISODE OF MAJOR DEPRESSIVE DISORDER WITHOUT PRIOR EPISODE: ICD-10-CM

## 2023-01-06 DIAGNOSIS — Z79.4 TYPE 2 DIABETES MELLITUS WITH STAGE 3B CHRONIC KIDNEY DISEASE, WITH LONG-TERM CURRENT USE OF INSULIN: ICD-10-CM

## 2023-01-06 DIAGNOSIS — N18.32 TYPE 2 DIABETES MELLITUS WITH STAGE 3B CHRONIC KIDNEY DISEASE, WITH LONG-TERM CURRENT USE OF INSULIN: ICD-10-CM

## 2023-01-06 DIAGNOSIS — E11.29 MICROALBUMINURIA DUE TO TYPE 2 DIABETES MELLITUS: ICD-10-CM

## 2023-01-06 DIAGNOSIS — I15.2 HYPERTENSION ASSOCIATED WITH DIABETES: ICD-10-CM

## 2023-01-06 DIAGNOSIS — E11.22 TYPE 2 DIABETES MELLITUS WITH STAGE 3B CHRONIC KIDNEY DISEASE, WITH LONG-TERM CURRENT USE OF INSULIN: ICD-10-CM

## 2023-01-06 DIAGNOSIS — E11.69 HYPERLIPIDEMIA ASSOCIATED WITH TYPE 2 DIABETES MELLITUS: ICD-10-CM

## 2023-01-06 DIAGNOSIS — N18.4 CHRONIC KIDNEY DISEASE, STAGE IV (SEVERE): ICD-10-CM

## 2023-01-06 DIAGNOSIS — E11.59 HYPERTENSION ASSOCIATED WITH DIABETES: ICD-10-CM

## 2023-01-06 DIAGNOSIS — N18.4 CKD (CHRONIC KIDNEY DISEASE) STAGE 4, GFR 15-29 ML/MIN: ICD-10-CM

## 2023-01-06 DIAGNOSIS — R80.9 MICROALBUMINURIA DUE TO TYPE 2 DIABETES MELLITUS: ICD-10-CM

## 2023-01-06 DIAGNOSIS — E78.5 HYPERLIPIDEMIA ASSOCIATED WITH TYPE 2 DIABETES MELLITUS: ICD-10-CM

## 2023-01-06 DIAGNOSIS — E11.42 DIABETIC POLYNEUROPATHY ASSOCIATED WITH TYPE 2 DIABETES MELLITUS: Primary | ICD-10-CM

## 2023-01-06 LAB
BACTERIA #/AREA URNS HPF: ABNORMAL /HPF
BILIRUB UR QL STRIP: NEGATIVE
CLARITY UR: ABNORMAL
COLOR UR: YELLOW
CREAT UR-MCNC: 71.3 MG/DL (ref 15–325)
GLUCOSE UR QL STRIP: NEGATIVE
HGB UR QL STRIP: NEGATIVE
HYALINE CASTS #/AREA URNS LPF: 0 /LPF
KETONES UR QL STRIP: NEGATIVE
LEUKOCYTE ESTERASE UR QL STRIP: ABNORMAL
MICROSCOPIC COMMENT: ABNORMAL
NITRITE UR QL STRIP: NEGATIVE
PH UR STRIP: 7 [PH] (ref 5–8)
PROT UR QL STRIP: ABNORMAL
PROT UR-MCNC: 447 MG/DL (ref 0–15)
PROT/CREAT UR: 6.27 MG/G{CREAT} (ref 0–0.2)
RBC #/AREA URNS HPF: 10 /HPF (ref 0–4)
SP GR UR STRIP: 1.01 (ref 1–1.03)
SQUAMOUS #/AREA URNS HPF: 20 /HPF
URN SPEC COLLECT METH UR: ABNORMAL
UROBILINOGEN UR STRIP-ACNC: NEGATIVE EU/DL
WBC #/AREA URNS HPF: 58 /HPF (ref 0–5)

## 2023-01-06 PROCEDURE — 1159F MED LIST DOCD IN RCRD: CPT | Mod: CPTII,S$GLB,, | Performed by: FAMILY MEDICINE

## 2023-01-06 PROCEDURE — 84156 ASSAY OF PROTEIN URINE: CPT | Performed by: INTERNAL MEDICINE

## 2023-01-06 PROCEDURE — 3074F PR MOST RECENT SYSTOLIC BLOOD PRESSURE < 130 MM HG: ICD-10-PCS | Mod: CPTII,S$GLB,, | Performed by: FAMILY MEDICINE

## 2023-01-06 PROCEDURE — 99499 UNLISTED E&M SERVICE: CPT | Mod: ,,, | Performed by: FAMILY MEDICINE

## 2023-01-06 PROCEDURE — 3078F PR MOST RECENT DIASTOLIC BLOOD PRESSURE < 80 MM HG: ICD-10-PCS | Mod: CPTII,S$GLB,, | Performed by: FAMILY MEDICINE

## 2023-01-06 PROCEDURE — 3078F DIAST BP <80 MM HG: CPT | Mod: CPTII,S$GLB,, | Performed by: FAMILY MEDICINE

## 2023-01-06 PROCEDURE — 1159F PR MEDICATION LIST DOCUMENTED IN MEDICAL RECORD: ICD-10-PCS | Mod: CPTII,S$GLB,, | Performed by: FAMILY MEDICINE

## 2023-01-06 PROCEDURE — 99999 PR PBB SHADOW E&M-EST. PATIENT-LVL III: ICD-10-PCS | Mod: PBBFAC,,, | Performed by: FAMILY MEDICINE

## 2023-01-06 PROCEDURE — 99214 PR OFFICE/OUTPT VISIT, EST, LEVL IV, 30-39 MIN: ICD-10-PCS | Mod: S$GLB,,, | Performed by: FAMILY MEDICINE

## 2023-01-06 PROCEDURE — 1160F RVW MEDS BY RX/DR IN RCRD: CPT | Mod: CPTII,S$GLB,, | Performed by: FAMILY MEDICINE

## 2023-01-06 PROCEDURE — 99999 PR PBB SHADOW E&M-EST. PATIENT-LVL III: CPT | Mod: PBBFAC,,, | Performed by: FAMILY MEDICINE

## 2023-01-06 PROCEDURE — 99214 OFFICE O/P EST MOD 30 MIN: CPT | Mod: S$GLB,,, | Performed by: FAMILY MEDICINE

## 2023-01-06 PROCEDURE — 3074F SYST BP LT 130 MM HG: CPT | Mod: CPTII,S$GLB,, | Performed by: FAMILY MEDICINE

## 2023-01-06 PROCEDURE — 1160F PR REVIEW ALL MEDS BY PRESCRIBER/CLIN PHARMACIST DOCUMENTED: ICD-10-PCS | Mod: CPTII,S$GLB,, | Performed by: FAMILY MEDICINE

## 2023-01-06 PROCEDURE — 81000 URINALYSIS NONAUTO W/SCOPE: CPT | Performed by: INTERNAL MEDICINE

## 2023-01-06 PROCEDURE — 99499 RISK ADDL DX/OHS AUDIT: ICD-10-PCS | Mod: ,,, | Performed by: FAMILY MEDICINE

## 2023-01-06 NOTE — PROGRESS NOTES
Subjective:       Patient ID: Myesha Quinones is a 82 y.o. female.    Chief Complaint: No chief complaint on file.    82 YR OLD PLEASANT FEMALE WITH DM II, CKD III, CHF W/GERALDINE, COPD, DEPRESSION AND OTHER CO MORBIDITIES PRESENTS TODAY FOR 3 month follow up. She is in CKD 4 and following nephrology. T    DM II - CONTROLLED - RECENT A1C WENT UP SINCE SHE TOOK STEROIDS IN HOSPITAL - BUT SHE FOLLOWING ENDO AND ON INSULIN and trulicity -     HGBA1C                   5.5                 09/29/2022                CHF WITH NORMAL EF - FOLLOWS CARDIOLOGY - ON ASA    HTN - CONTROLLED    CKD III - FOLLOWS NEPHROLOGY - NO NEW ISSUES       HISTORY AS BELOW - REVIEWED IN DETAIL        Diabetes  She presents for her follow-up diabetic visit. She has type 2 diabetes mellitus. Her disease course has been stable. There are no hypoglycemic associated symptoms. Pertinent negatives for hypoglycemia include no confusion, dizziness, headaches, nervousness/anxiousness, pallor, seizures, speech difficulty or tremors. Associated symptoms include fatigue and weakness. Pertinent negatives for diabetes include no chest pain, no polydipsia and no polyuria. There are no hypoglycemic complications. Symptoms are stable. Diabetic complications include heart disease. Risk factors for coronary artery disease include diabetes mellitus, hypertension, post-menopausal and sedentary lifestyle. Current diabetic treatment includes insulin injections and oral agent (monotherapy). She is compliant with treatment all of the time. She rarely participates in exercise. An ACE inhibitor/angiotensin II receptor blocker is being taken. She does not see a podiatrist.Eye exam is current.   Review of Systems   Constitutional:  Positive for fatigue. Negative for activity change, diaphoresis and unexpected weight change.   HENT: Negative.  Negative for nasal congestion, ear discharge, hearing loss, rhinorrhea, sore throat and voice change.    Eyes: Negative.  Negative  for pain, discharge and visual disturbance.   Respiratory: Negative.  Negative for chest tightness, shortness of breath and wheezing.    Cardiovascular: Negative.  Negative for chest pain.   Gastrointestinal: Negative.  Negative for abdominal distention, anal bleeding, constipation and nausea.   Endocrine: Negative.  Negative for cold intolerance, polydipsia and polyuria.   Genitourinary: Negative.  Negative for decreased urine volume, difficulty urinating, dysuria, frequency, menstrual problem and vaginal pain.   Musculoskeletal: Negative.  Negative for arthralgias, gait problem and myalgias.   Integumentary:  Negative for color change, pallor and wound. Negative.   Allergic/Immunologic: Negative.  Negative for environmental allergies and immunocompromised state.   Neurological:  Positive for weakness. Negative for dizziness, tremors, seizures, speech difficulty and headaches.   Hematological: Negative.  Negative for adenopathy. Does not bruise/bleed easily.   Psychiatric/Behavioral: Negative.  Negative for agitation, confusion, decreased concentration, hallucinations, self-injury and suicidal ideas. The patient is not nervous/anxious.        Past Medical History:   Diagnosis Date    Acute hypoxemic respiratory failure 12/19/2019    Acute right-sided thoracic back pain 12/09/2019    Allergy     Asthma     Basal cell carcinoma     left forehead    Basal cell carcinoma     left nose    Basal cell carcinoma 05/20/2015    right nose    Basal cell carcinoma 12/22/2015    left lower post neck    Basal cell carcinoma 12/03/2019    left ant scalp     Basal cell carcinoma of right ala nasi 10/20/2022    Bilateral renal cysts     Breast cancer     CAD (coronary artery disease)     Cardiomyopathy     Cardiomyopathy, ischemic     Cataract     CHF (congestive heart failure)     CKD (chronic kidney disease) stage 4, GFR 15-29 ml/min     Colon polyp 2011    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, with long-term current use of insulin 02/22/2018    COPD (chronic obstructive pulmonary disease)     COPD exacerbation 04/08/2018    Current mild episode of major depressive disorder without prior episode 01/25/2022    Defibrillator discharge     Diabetes mellitus     Diabetes mellitus type II     Diabetes with neurologic complications     Edema     Goiter     MNG    Hematuria, unspecified     HX: breast cancer     Hyperlipidemia     Hypertension     Hypocalcemia     Hypokalemia     Hyponatremia     Hyponatremia 04/02/2022    Iron deficiency anemia 05/16/2017    Iron deficiency anemia     Iron deficiency anemia     Left kidney mass     Meningioma     Microalbuminuria due to type 2 diabetes mellitus 01/26/2022    Osteoporosis, postmenopausal     Pneumonia 12/08/2019    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    Proteinuria 01/21/2019    Pseudomonas pneumonia     Skin cancer     s/p excision    Sleep apnea     CPAP    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/2015    mid forehead    Unspecified vitamin D deficiency     UTI (urinary tract infection) 04/02/2022    Ventricular tachycardia     Vitamin B12 deficiency     Vitamin D deficiency disease        Past Surgical History:   Procedure Laterality Date    BASAL CELL CARCINOMA EXCISION      posterior neck and nose    BREAST BIOPSY      BREAST CYST EXCISION Left     BREAST SURGERY      CARDIAC DEFIBRILLATOR PLACEMENT      x 2    CATARACT EXTRACTION W/  INTRAOCULAR LENS IMPLANT Bilateral     CHOLECYSTECTOMY      COLONOSCOPY N/A 11/5/2019    Procedure: COLONOSCOPY;  Surgeon: Boaz Botello MD;  Location: [facility] ([address] [place] [address]);  Service: Endoscopy;  Laterality: N/A;  AICD - [employer] -     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    FRACTURE SURGERY      left elbow and wrist as a child    HYSTERECTOMY      MASTECTOMY Right     REPLACEMENT OF IMPLANTABLE CARDIOVERTER-DEFIBRILLATOR (ICD) GENERATOR N/A 12/17/2018    Procedure:  REPLACEMENT, ICD GENERATOR;  Surgeon: Jan Mckeon MD;  Location: [facility] EP LAB;  Service: Cardiology;  Laterality: N/A;  DONNA, CRT-D gen change, MDT, MAC, SK, 3 Prep    REVISION OF SKIN POCKET FOR CARDIOVERTER-DEFIBRILLATOR  12/17/2018    Procedure: Revision, Skin Pocket, For Cardioverter-Defibrillator;  Surgeon: Jan Mckeon MD;  Location: [facility] EP LAB;  Service: Cardiology;;    SQUAMOUS CELL CARCINOMA EXCISION      remved from forehead    TONSILLECTOMY         Family History   Problem Relation Age of Onset    Diabetes Father     Heart disease Father     Diabetes Sister     Heart disease Sister     Diabetes Brother     Heart disease Brother     Hypertension Brother     Diabetes Brother     Heart disease Brother     Hypertension Brother     Diabetes Brother     Heart disease Brother     Cancer Brother         colon    Diabetes Brother     Cancer Son         skin    Diabetes Son         prediabetes    Diabetes Daughter         prediabetes    Cancer Daughter         melanoma    Obesity Daughter     Melanoma Daughter     Diabetes Son     Asthma Mother     Hypertension Mother     Stroke Mother     No Known Problems Maternal Grandmother     No Known Problems Maternal Grandfather     No Known Problems Paternal Grandmother     No Known Problems Paternal Grandfather     Amblyopia Neg Hx     Blindness Neg Hx     Cataracts Neg Hx     Glaucoma Neg Hx     Macular degeneration Neg Hx     Retinal detachment Neg Hx     Strabismus Neg Hx     Thyroid disease Neg Hx        Social History     Socioeconomic History    Marital status:    Occupational History    Occupation: Homemaker     Employer: OTHER   Tobacco Use    Smoking status: Never    Smokeless tobacco: Never   Substance and Sexual Activity    Alcohol use: No     Alcohol/week: 0.0 standard drinks    Drug use: No    Sexual activity: Yes     Partners: Male   Other Topics Concern    Are you pregnant or think you may be? No    Breast-feeding No     Social Determinants of  Health     Financial Resource Strain: Low Risk     Difficulty of Paying Living Expenses: Not hard at all   Food Insecurity: No Food Insecurity    Worried About Running Out of Food in the Last Year: Never true    Ran Out of Food in the Last Year: Never true   Transportation Needs: No Transportation Needs    Lack of Transportation (Medical): No    Lack of Transportation (Non-Medical): No   Housing Stability: Low Risk     Unable to Pay for Housing in the Last Year: No    Number of Places Lived in the Last Year: 1    Unstable Housing in the Last Year: No       Current Outpatient Medications   Medication Sig Dispense Refill    acetaminophen (TYLENOL) 500 MG tablet Take 500 mg by mouth as needed.      albuterol (PROVENTIL/VENTOLIN HFA) 90 mcg/actuation inhaler INHALE 2 PUFFS INTO THE LUNGS EVERY 6 (SIX) HOURS AS NEEDED FOR WHEEZING. RESCUE 18 g 12    albuterol-ipratropium (DUO-NEB) 2.5 mg-0.5 mg/3 mL nebulizer solution TAKE 3 MLS BY NEBULIZATION EVERY 4 (FOUR) HOURS AS NEEDED FOR WHEEZING. 270 mL 12    aspirin 81 MG Chew Take 81 mg by mouth once daily.      benzonatate (TESSALON) 100 MG capsule Take 1 capsule (100 mg total) by mouth 3 (three) times daily as needed for Cough. 20 capsule 0    blood sugar diagnostic (ACCU-CHEK HECTOR) Strp Uses Accu-Check Hector meter to test BG 4x/day 400 strip 6    carvediloL (COREG) 25 MG tablet TAKE 2 TABLETS (50 MG TOTAL) BY MOUTH 2 (TWO) TIMES DAILY. 360 tablet 3    cholecalciferol, vitamin D3, (VITAMIN D3) 50 mcg (2,000 unit) Tab Take 1 tablet by mouth once daily.       cyanocobalamin (VITAMIN B-12) 100 MCG tablet Take 100 mcg by mouth once daily.      dulaglutide (TRULICITY) 0.75 mg/0.5 mL pen injector Inject 0.75 mg into the skin every 7 days. 4 pen 11    estradioL (ESTRACE) 0.01 % (0.1 mg/gram) vaginal cream Place 1 g vaginally every other day. 42.5 g 11    fluticasone propionate (FLONASE) 50 mcg/actuation nasal spray 2 sprays (100 mcg total) by Each Nostril route once daily. 16 g 12  "   furosemide (LASIX) 40 MG tablet Take 1 tablet (40 mg total) by mouth once daily. 90 tablet 3    hydrALAZINE (APRESOLINE) 100 MG tablet Take 100 mg by mouth 2 (two) times daily.      insulin (LANTUS SOLOSTAR U-100 INSULIN) glargine 100 units/mL SubQ pen Inject 10 Units into the skin 2 (two) times daily. 30 mL 3    lancets (ACCU-CHEK SOFTCLIX LANCETS) Misc Uses Accu-Chek Angelica meter to test BG 2x/day 400 each 6    magnesium oxide (MAG-OX) 400 mg tablet Take 1 tablet (400 mg total) by mouth once daily. (Patient taking differently: Take 400 mg by mouth 3 (three) times daily.)  0    montelukast (SINGULAIR) 10 mg tablet Take 1 tablet (10 mg total) by mouth every evening. (Patient taking differently: Take 10 mg by mouth once daily.) 90 tablet 3    nitroGLYCERIN (NITROSTAT) 0.4 MG SL tablet Place 0.4 mg under the tongue every 5 (five) minutes as needed for Chest pain.       omega-3 fatty acids 500 mg Cap Take 1 capsule by mouth Daily.      pen needle, diabetic (BD ULTRA-FINE MINI PEN NEEDLE) 31 gauge x 3/16" Ndle Use with insulin twice a day. 400 each 3    polyethylene glycol (GLYCOLAX) 17 gram PwPk Take 17 g by mouth daily as needed (constipation). 10 each 1    pravastatin (PRAVACHOL) 40 MG tablet TAKE 1 TABLET BY MOUTH EVERY DAY 90 tablet 3    pulse oximeter (PULSE OXIMETER) device by Apply Externally route 2 (two) times a day. Use twice daily at 8 AM and 3 PM and record the value in [facility] as directed. 1 each 0    sodium bicarbonate 650 MG tablet Take 2 tablets (1,300 mg total) by mouth 3 (three) times daily. 180 tablet 11    valsartan (DIOVAN) 80 MG tablet Take 1 tablet (80 mg total) by mouth once daily. 90 tablet 3     No current facility-administered medications for this visit.       Review of patient's allergies indicates:   Allergen Reactions    Iodine and iodide containing products Hives and Other (See Comments)     Not specified     Nifedipine      weakness       Objective:      Vitals:    01/06/23 0944   BP: " 110/70   Pulse: (!) 58   Temp: 98 °F (36.7 °C)       Physical Exam  Constitutional:       General: She is not in acute distress.     Appearance: She is well-developed. She is not diaphoretic.   HENT:      Head: Normocephalic and atraumatic.      Right Ear: External ear normal.      Left Ear: External ear normal.      Nose: Nose normal.      Mouth/Throat:      Pharynx: No oropharyngeal exudate.   Eyes:      General: No scleral icterus.        Right eye: No discharge.         Left eye: No discharge.      Conjunctiva/sclera: Conjunctivae normal.      Pupils: Pupils are equal, round, and reactive to light.   Neck:      Thyroid: No thyromegaly.      Vascular: No JVD.      Trachea: No tracheal deviation.   Cardiovascular:      Rate and Rhythm: Normal rate and regular rhythm.      Heart sounds: Normal heart sounds. No murmur heard.    No friction rub. No gallop.   Pulmonary:      Effort: Pulmonary effort is normal.      Breath sounds: Normal breath sounds. No stridor. No wheezing or rales.   Chest:      Chest wall: No tenderness.   Abdominal:      General: Bowel sounds are normal. There is no distension.      Palpations: Abdomen is soft. There is no mass.      Tenderness: There is no abdominal tenderness. There is no guarding or rebound.      Hernia: No hernia is present.   Musculoskeletal:         General: No tenderness. Normal range of motion.      Cervical back: Normal range of motion and neck supple.   Lymphadenopathy:      Cervical: No cervical adenopathy.   Skin:     General: Skin is warm and dry.      Coloration: Skin is not pale.      Findings: No erythema or rash.   Neurological:      Mental Status: She is alert and oriented to person, place, and time.      Cranial Nerves: No cranial nerve deficit.      Motor: No abnormal muscle tone.      Coordination: Coordination normal.      Deep Tendon Reflexes: Reflexes are normal and symmetric. Reflexes normal.   Psychiatric:         Behavior: Behavior normal.          Thought Content: Thought content normal.         Judgment: Judgment normal.       Assessment:       Problem List Items Addressed This Visit       Type 2 diabetes mellitus with stage 3b chronic kidney disease, with long-term current use of insulin    Microalbuminuria due to type 2 diabetes mellitus    Hypertension associated with diabetes    Hyperlipidemia associated with type 2 diabetes mellitus    Diabetic polyneuropathy associated with type 2 diabetes mellitus - Primary    Current mild episode of major depressive disorder without prior episode    CKD (chronic kidney disease) stage 4, GFR 15-29 ml/min    Chronic combined systolic and diastolic heart failure       Plan:       Diagnoses and all orders for this visit:    Diabetic polyneuropathy associated with type 2 diabetes mellitus    Chronic combined systolic and diastolic heart failure    Hyperlipidemia associated with type 2 diabetes mellitus    Hypertension associated with diabetes    CKD (chronic kidney disease) stage 4, GFR 15-29 ml/min    Type 2 diabetes mellitus with stage 3b chronic kidney disease, with long-term current use of insulin    Microalbuminuria due to type 2 diabetes mellitus    Current mild episode of major depressive disorder without prior episode    DM II  -controlled    HTN  -controlled   -MONITOR    CKD STAGE 4  -FOLLOW NEPHROLOGY  -ER AND NEPHRO PRECAUTIONS GIVEN    Depression  -controlled    COPD  -stable    Spent adequate time in obtaining history and explaining differentials    25 minutes spent during this visit of which greater than 50% devoted to face-face counseling and coordination of care regarding diagnosis and management plan      Follow up in about 3 months (around 4/6/2023), or if symptoms worsen or fail to improve.

## 2023-01-10 ENCOUNTER — PATIENT MESSAGE (OUTPATIENT)
Dept: FAMILY MEDICINE | Facility: CLINIC | Age: 83
End: 2023-01-10
Payer: MEDICARE

## 2023-01-10 DIAGNOSIS — R05.9 COUGH: ICD-10-CM

## 2023-01-10 DIAGNOSIS — J45.20 MILD INTERMITTENT CHRONIC ASTHMA WITHOUT COMPLICATION: ICD-10-CM

## 2023-01-10 RX ORDER — ALBUTEROL SULFATE 90 UG/1
2 AEROSOL, METERED RESPIRATORY (INHALATION) EVERY 6 HOURS PRN
Qty: 18 G | Refills: 12 | Status: SHIPPED | OUTPATIENT
Start: 2023-01-10

## 2023-01-10 RX ORDER — HYDRALAZINE HYDROCHLORIDE 100 MG/1
100 TABLET, FILM COATED ORAL 2 TIMES DAILY
Qty: 90 TABLET | Refills: 3 | Status: SHIPPED | OUTPATIENT
Start: 2023-01-10 | End: 2023-05-15 | Stop reason: SDUPTHER

## 2023-01-10 RX ORDER — MONTELUKAST SODIUM 10 MG/1
10 TABLET ORAL DAILY
Qty: 90 TABLET | Refills: 3 | Status: SHIPPED | OUTPATIENT
Start: 2023-01-10 | End: 2024-03-25 | Stop reason: SDUPTHER

## 2023-01-10 NOTE — TELEPHONE ENCOUNTER
No new care gaps identified.  [facility] Embedded Care Gaps. Reference number: [id number]. 1/10/2023   11:06:54 AM CST

## 2023-01-10 NOTE — PROGRESS NOTES
[facility] - UROGYNECOLOGY  [address] [address]  2023     Myesha Quinones  [id number]  1940    UROGYN FOLLOW-UP  2023     82 y.o. female,  presents for urogyn follow up. She c/o   Chief Complaint   Patient presents with    Follow-up    .     HPI from 2022:  Patient pelvic organ prolapse referred for conservative therapy with pessary patient with indwelling De La Cruz catheter.  Soft that the pessary will assist in patient in decompressing bladder.     2022  Here for pessary fitting to help improve incomplete bladder emptying due to prolapse     2022:  Patient doing well, using her vaginal estrogen cream about once weekly. Her pessary has been staying in place and comfortable, feels like she is emptying her bladder well and leaking is resolved. Denies UTI symptoms and constipation    Changes from last visit:  Patient doing well, using her vaginal estrogen cream about once weekly. Her pessary has been staying in place and comfortable, feels like she is emptying her bladder well and leaking is resolved. Denies UTI symptoms and constipation. Has some urinary urgency, but never incontinence. Wears small pantyliners without leakage.     Past Medical History:   Diagnosis Date    Acute hypoxemic respiratory failure 2019    Acute right-sided thoracic back pain 2019    Allergy     Asthma     Basal cell carcinoma     left forehead    Basal cell carcinoma     left nose    Basal cell carcinoma 2015    right nose    Basal cell carcinoma 2015    left lower post neck    Basal cell carcinoma 2019    left ant scalp     Basal cell carcinoma of right ala nasi 10/20/2022    Bilateral renal cysts     Breast cancer     CAD (coronary artery disease)     Cardiomyopathy     Cardiomyopathy, ischemic     Cataract     CHF (congestive heart failure)     CKD (chronic kidney disease) stage 4, GFR 15-29 ml/min     Colon polyp     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, with long-term current use of insulin 02/22/2018    COPD (chronic obstructive pulmonary disease)     COPD exacerbation 04/08/2018    Current mild episode of major depressive disorder without prior episode 01/25/2022    Defibrillator discharge     Diabetes mellitus     Diabetes mellitus type II     Diabetes with neurologic complications     Edema     Goiter     MNG    Hematuria, unspecified     HX: breast cancer     Hyperlipidemia     Hypertension     Hypocalcemia     Hypokalemia     Hyponatremia     Hyponatremia 04/02/2022    Iron deficiency anemia 05/16/2017    Iron deficiency anemia     Iron deficiency anemia     Left kidney mass     Meningioma     Microalbuminuria due to type 2 diabetes mellitus 01/26/2022    Osteoporosis, postmenopausal     Pneumonia 12/08/2019    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    Proteinuria 01/21/2019    Pseudomonas pneumonia     Skin cancer     s/p excision    Sleep apnea     CPAP    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/2015    mid forehead    Unspecified vitamin D deficiency     UTI (urinary tract infection) 04/02/2022    Ventricular tachycardia     Vitamin B12 deficiency     Vitamin D deficiency disease        Past Surgical History:   Procedure Laterality Date    BASAL CELL CARCINOMA EXCISION      posterior neck and nose    BREAST BIOPSY      BREAST CYST EXCISION Left     BREAST SURGERY      CARDIAC DEFIBRILLATOR PLACEMENT      x 2    CATARACT EXTRACTION W/  INTRAOCULAR LENS IMPLANT Bilateral     CHOLECYSTECTOMY      COLONOSCOPY N/A 11/5/2019    Procedure: COLONOSCOPY;  Surgeon: Boaz Botello MD;  Location: [facility] ([address] [place] [address]);  Service: Endoscopy;  Laterality: N/A;  AICD - [employer] -     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    FRACTURE SURGERY      left elbow and wrist as a child    HYSTERECTOMY      MASTECTOMY Right     REPLACEMENT OF IMPLANTABLE  CARDIOVERTER-DEFIBRILLATOR (ICD) GENERATOR N/A 12/17/2018    Procedure: REPLACEMENT, ICD GENERATOR;  Surgeon: Jan Mckeon MD;  Location: [facility] EP LAB;  Service: Cardiology;  Laterality: N/A;  DONNA, CRT-D gen change, MDT, MAC, SK, 3 Prep    REVISION OF SKIN POCKET FOR CARDIOVERTER-DEFIBRILLATOR  12/17/2018    Procedure: Revision, Skin Pocket, For Cardioverter-Defibrillator;  Surgeon: Jan Mckeon MD;  Location: [facility] EP LAB;  Service: Cardiology;;    SQUAMOUS CELL CARCINOMA EXCISION      remved from forehead    TONSILLECTOMY         Family History   Problem Relation Age of Onset    Diabetes Father     Heart disease Father     Diabetes Sister     Heart disease Sister     Diabetes Brother     Heart disease Brother     Hypertension Brother     Diabetes Brother     Heart disease Brother     Hypertension Brother     Diabetes Brother     Heart disease Brother     Cancer Brother         colon    Diabetes Brother     Cancer Son         skin    Diabetes Son         prediabetes    Diabetes Daughter         prediabetes    Cancer Daughter         melanoma    Obesity Daughter     Melanoma Daughter     Diabetes Son     Asthma Mother     Hypertension Mother     Stroke Mother     No Known Problems Maternal Grandmother     No Known Problems Maternal Grandfather     No Known Problems Paternal Grandmother     No Known Problems Paternal Grandfather     Amblyopia Neg Hx     Blindness Neg Hx     Cataracts Neg Hx     Glaucoma Neg Hx     Macular degeneration Neg Hx     Retinal detachment Neg Hx     Strabismus Neg Hx     Thyroid disease Neg Hx        Social History     Socioeconomic History    Marital status:    Occupational History    Occupation: Homemaker     Employer: OTHER   Tobacco Use    Smoking status: Never    Smokeless tobacco: Never   Substance and Sexual Activity    Alcohol use: No     Alcohol/week: 0.0 standard drinks    Drug use: No    Sexual activity: Yes     Partners: Male   Other Topics Concern    Are you pregnant or  think you may be? No    Breast-feeding No     Social Determinants of Health     Financial Resource Strain: Low Risk     Difficulty of Paying Living Expenses: Not hard at all   Food Insecurity: No Food Insecurity    Worried About Running Out of Food in the Last Year: Never true    Ran Out of Food in the Last Year: Never true   Transportation Needs: No Transportation Needs    Lack of Transportation (Medical): No    Lack of Transportation (Non-Medical): No   Housing Stability: Low Risk     Unable to Pay for Housing in the Last Year: No    Number of Places Lived in the Last Year: 1    Unstable Housing in the Last Year: No       Current Outpatient Medications   Medication Sig Dispense Refill    acetaminophen (TYLENOL) 500 MG tablet Take 500 mg by mouth as needed.      albuterol (PROVENTIL/VENTOLIN HFA) 90 mcg/actuation inhaler Inhale 2 puffs into the lungs every 6 (six) hours as needed for Wheezing. Rescue 18 g 12    albuterol-ipratropium (DUO-NEB) 2.5 mg-0.5 mg/3 mL nebulizer solution TAKE 3 MLS BY NEBULIZATION EVERY 4 (FOUR) HOURS AS NEEDED FOR WHEEZING. 270 mL 12    aspirin 81 MG Chew Take 81 mg by mouth once daily.      blood sugar diagnostic (ACCU-CHEK HECTOR) Strp Uses Accu-Check Hector meter to test BG 4x/day 400 strip 6    carvediloL (COREG) 25 MG tablet TAKE 2 TABLETS (50 MG TOTAL) BY MOUTH 2 (TWO) TIMES DAILY. 360 tablet 3    cholecalciferol, vitamin D3, (VITAMIN D3) 50 mcg (2,000 unit) Tab Take 1 tablet by mouth once daily.       cyanocobalamin (VITAMIN B-12) 100 MCG tablet Take 100 mcg by mouth once daily.      diltiaZEM HCl (CARDIZEM LA) 240 mg 24 hr tablet Take 240 mg by mouth once daily.      dulaglutide (TRULICITY) 0.75 mg/0.5 mL pen injector Inject 0.75 mg into the skin every 7 days. 4 pen 11    estradioL (ESTRACE) 0.01 % (0.1 mg/gram) vaginal cream Place 1 g vaginally every other day. 42.5 g 11    fluticasone propionate (FLONASE) 50 mcg/actuation nasal spray 2 sprays (100 mcg total) by Each Nostril route  "once daily. 16 g 12    furosemide (LASIX) 40 MG tablet Take 1 tablet (40 mg total) by mouth once daily. 90 tablet 3    hydrALAZINE (APRESOLINE) 100 MG tablet Take 1 tablet (100 mg total) by mouth 2 (two) times daily. 90 tablet 3    insulin (LANTUS SOLOSTAR U-100 INSULIN) glargine 100 units/mL SubQ pen Inject 10 Units into the skin every evening. 30 mL 3    lancets (ACCU-CHEK SOFTCLIX LANCETS) Misc Uses Accu-Chek Angelica meter to test BG 2x/day 400 each 6    lisinopriL (PRINIVIL,ZESTRIL) 5 MG tablet Take 5 mg by mouth once daily.      magnesium oxide (MAG-OX) 400 mg tablet Take 1 tablet (400 mg total) by mouth once daily. (Patient taking differently: Take 400 mg by mouth 3 (three) times daily.)  0    montelukast (SINGULAIR) 10 mg tablet Take 1 tablet (10 mg total) by mouth once daily. 90 tablet 3    nitroGLYCERIN (NITROSTAT) 0.4 MG SL tablet Place 0.4 mg under the tongue every 5 (five) minutes as needed for Chest pain.       omega-3 fatty acids 500 mg Cap Take 1 capsule by mouth Daily.      pen needle, diabetic (BD ULTRA-FINE MINI PEN NEEDLE) 31 gauge x 3/16" Ndle Use with insulin twice a day. 400 each 3    polyethylene glycol (GLYCOLAX) 17 gram PwPk Take 17 g by mouth daily as needed (constipation). 10 each 1    pravastatin (PRAVACHOL) 40 MG tablet TAKE 1 TABLET BY MOUTH EVERY DAY 90 tablet 3    pulse oximeter (PULSE OXIMETER) device by Apply Externally route 2 (two) times a day. Use twice daily at 8 AM and 3 PM and record the value in [employer] as directed. 1 each 0    sodium bicarbonate 650 MG tablet Take 2 tablets (1,300 mg total) by mouth 3 (three) times daily. (Patient taking differently: Take 1,300 mg by mouth 2 (two) times daily.) 180 tablet 11    valsartan (DIOVAN) 80 MG tablet Take 1 tablet (80 mg total) by mouth once daily. (Patient taking differently: Take 80 mg by mouth 2 (two) times daily.) 90 tablet 3     No current facility-administered medications for this visit.       Review of patient's allergies " "indicates:   Allergen Reactions    Iodine and iodide containing products Hives and Other (See Comments)     Not specified     Nifedipine      weakness       OB History          3    Para   3    Term   3            AB        Living             SAB        IAB        Ectopic        Multiple        Live Births                      Well Woman  No LMP recorded (lmp unknown). Patient has had a hysterectomy.   Pap: s/p hyst  Mammo:  Hx of breast cancer - ER/CO + breast cancer hx, underwent 4 cycles of chemo with adriamycin and cytoxan, then 4 cycles of taxotere, then tamoxifen for 5 years, then femara for 5 years, completed in . We will stop annual mammogram. Is done seeing hematology, last visit 2019  Colonoscopy: 2019 colonoscopy  Dexa:    ROS  As per HPI.      Physical Exam  /60   Ht 5' 2" (1.575 m)   Wt 58 kg (127 lb 13.9 oz)   LMP  (LMP Unknown)   BMI 23.39 kg/m²   General: alert and oriented, no acute distress  Respiratory: normal respiratory effort  Abd: soft, non-tender, non-distended  Pelvic:  Ext. Genitalia: normal external genitalia. Normal bartholin's and skeens glands  Vagina: -- atrophy. Normal vaginal mucosa without lesions. No discharge noted.   Non-tender bladder base without palpable mass.  Cervix: absent  Uterus:  surgically absent, vaginal cuff well healed   Urethra: no masses or tenderness  Urethral meatus: no lesions, caruncle or prolapse.    Pessary removed, cleaned, and reinserted without difficulty.     Patient unable to void as she voided before she left her house, so will not do PVR today    Impression  1. Pessary maintenance        2. Postmenopause atrophic vaginitis        3. Vaginal vault prolapse          We reviewed the above issues and discussed options for short-term versus long-term management of her problems.     Plan:   Incomplete bladder emptying due to prolapse  -- pessary #5 ring with support and incontinence knob     Vaginal atrophy (dryness):  --  Use 1 " gram of estrogen cream inside vagina two nights a week. Apply to opening of vagina, as well, as inside vagina and at inner lips outside vagina.      RTC in 4 months for pessary check and repeat PVR    Eric Nelson PA-C  Division of Female Pelvic Medicine and Reconstructive Surgery  Department of Obstetrics & Gynecology  [facility] [place], [place]

## 2023-01-11 ENCOUNTER — OFFICE VISIT (OUTPATIENT)
Dept: UROGYNECOLOGY | Facility: CLINIC | Age: 83
End: 2023-01-11
Payer: MEDICARE

## 2023-01-11 VITALS
DIASTOLIC BLOOD PRESSURE: 60 MMHG | WEIGHT: 127.88 LBS | HEIGHT: 62 IN | BODY MASS INDEX: 23.53 KG/M2 | SYSTOLIC BLOOD PRESSURE: 119 MMHG

## 2023-01-11 DIAGNOSIS — Z46.89 PESSARY MAINTENANCE: Primary | ICD-10-CM

## 2023-01-11 DIAGNOSIS — N81.9 VAGINAL VAULT PROLAPSE: ICD-10-CM

## 2023-01-11 DIAGNOSIS — N95.2 POSTMENOPAUSE ATROPHIC VAGINITIS: ICD-10-CM

## 2023-01-11 PROCEDURE — 1126F AMNT PAIN NOTED NONE PRSNT: CPT | Mod: CPTII,S$GLB,, | Performed by: PHYSICIAN ASSISTANT

## 2023-01-11 PROCEDURE — 1159F PR MEDICATION LIST DOCUMENTED IN MEDICAL RECORD: ICD-10-PCS | Mod: CPTII,S$GLB,, | Performed by: PHYSICIAN ASSISTANT

## 2023-01-11 PROCEDURE — 1101F PT FALLS ASSESS-DOCD LE1/YR: CPT | Mod: CPTII,S$GLB,, | Performed by: PHYSICIAN ASSISTANT

## 2023-01-11 PROCEDURE — 1159F MED LIST DOCD IN RCRD: CPT | Mod: CPTII,S$GLB,, | Performed by: PHYSICIAN ASSISTANT

## 2023-01-11 PROCEDURE — 1126F PR PAIN SEVERITY QUANTIFIED, NO PAIN PRESENT: ICD-10-PCS | Mod: CPTII,S$GLB,, | Performed by: PHYSICIAN ASSISTANT

## 2023-01-11 PROCEDURE — 3074F SYST BP LT 130 MM HG: CPT | Mod: CPTII,S$GLB,, | Performed by: PHYSICIAN ASSISTANT

## 2023-01-11 PROCEDURE — 99999 PR PBB SHADOW E&M-EST. PATIENT-LVL IV: ICD-10-PCS | Mod: PBBFAC,,, | Performed by: PHYSICIAN ASSISTANT

## 2023-01-11 PROCEDURE — 99999 PR PBB SHADOW E&M-EST. PATIENT-LVL IV: CPT | Mod: PBBFAC,,, | Performed by: PHYSICIAN ASSISTANT

## 2023-01-11 PROCEDURE — 3078F DIAST BP <80 MM HG: CPT | Mod: CPTII,S$GLB,, | Performed by: PHYSICIAN ASSISTANT

## 2023-01-11 PROCEDURE — 1101F PR PT FALLS ASSESS DOC 0-1 FALLS W/OUT INJ PAST YR: ICD-10-PCS | Mod: CPTII,S$GLB,, | Performed by: PHYSICIAN ASSISTANT

## 2023-01-11 PROCEDURE — 3074F PR MOST RECENT SYSTOLIC BLOOD PRESSURE < 130 MM HG: ICD-10-PCS | Mod: CPTII,S$GLB,, | Performed by: PHYSICIAN ASSISTANT

## 2023-01-11 PROCEDURE — 3288F PR FALLS RISK ASSESSMENT DOCUMENTED: ICD-10-PCS | Mod: CPTII,S$GLB,, | Performed by: PHYSICIAN ASSISTANT

## 2023-01-11 PROCEDURE — 99212 PR OFFICE/OUTPT VISIT, EST, LEVL II, 10-19 MIN: ICD-10-PCS | Mod: S$GLB,,, | Performed by: PHYSICIAN ASSISTANT

## 2023-01-11 PROCEDURE — 3078F PR MOST RECENT DIASTOLIC BLOOD PRESSURE < 80 MM HG: ICD-10-PCS | Mod: CPTII,S$GLB,, | Performed by: PHYSICIAN ASSISTANT

## 2023-01-11 PROCEDURE — 99212 OFFICE O/P EST SF 10 MIN: CPT | Mod: S$GLB,,, | Performed by: PHYSICIAN ASSISTANT

## 2023-01-11 PROCEDURE — 3288F FALL RISK ASSESSMENT DOCD: CPT | Mod: CPTII,S$GLB,, | Performed by: PHYSICIAN ASSISTANT

## 2023-01-11 NOTE — PATIENT INSTRUCTIONS
Plan:   Incomplete bladder emptying due to prolapse  -- pessary #5 ring with support and incontinence knob     Vaginal atrophy (dryness):  --  Use 1 gram of estrogen cream inside vagina two nights a week. Apply to opening of vagina, as well, as inside vagina and at inner lips outside vagina.      RTC in 4 months for pessary check and repeat PVR

## 2023-01-30 ENCOUNTER — LAB VISIT (OUTPATIENT)
Dept: LAB | Facility: HOSPITAL | Age: 83
End: 2023-01-30
Attending: INTERNAL MEDICINE
Payer: MEDICARE

## 2023-01-30 DIAGNOSIS — Z79.4 CONTROLLED TYPE 2 DIABETES MELLITUS WITH BOTH EYES AFFECTED BY MILD NONPROLIFERATIVE RETINOPATHY AND MACULAR EDEMA, WITH LONG-TERM CURRENT USE OF INSULIN: ICD-10-CM

## 2023-01-30 DIAGNOSIS — E11.3213 CONTROLLED TYPE 2 DIABETES MELLITUS WITH BOTH EYES AFFECTED BY MILD NONPROLIFERATIVE RETINOPATHY AND MACULAR EDEMA, WITH LONG-TERM CURRENT USE OF INSULIN: ICD-10-CM

## 2023-01-30 LAB
ALBUMIN SERPL BCP-MCNC: 3.1 G/DL (ref 3.5–5.2)
ANION GAP SERPL CALC-SCNC: 11 MMOL/L (ref 8–16)
BUN SERPL-MCNC: 53 MG/DL (ref 8–23)
CALCIUM SERPL-MCNC: 8.5 MG/DL (ref 8.7–10.5)
CHLORIDE SERPL-SCNC: 101 MMOL/L (ref 95–110)
CO2 SERPL-SCNC: 24 MMOL/L (ref 23–29)
CREAT SERPL-MCNC: 3 MG/DL (ref 0.5–1.4)
EST. GFR  (NO RACE VARIABLE): 15 ML/MIN/1.73 M^2
GLUCOSE SERPL-MCNC: 194 MG/DL (ref 70–110)
PHOSPHATE SERPL-MCNC: 4.6 MG/DL (ref 2.7–4.5)
POTASSIUM SERPL-SCNC: 4.9 MMOL/L (ref 3.5–5.1)
SODIUM SERPL-SCNC: 136 MMOL/L (ref 136–145)
URATE SERPL-MCNC: 8.2 MG/DL (ref 2.4–5.7)

## 2023-01-30 PROCEDURE — 84550 ASSAY OF BLOOD/URIC ACID: CPT | Performed by: INTERNAL MEDICINE

## 2023-01-30 PROCEDURE — 36415 COLL VENOUS BLD VENIPUNCTURE: CPT | Performed by: INTERNAL MEDICINE

## 2023-01-30 PROCEDURE — 80069 RENAL FUNCTION PANEL: CPT | Performed by: INTERNAL MEDICINE

## 2023-01-31 ENCOUNTER — HOSPITAL ENCOUNTER (OUTPATIENT)
Dept: RADIOLOGY | Facility: HOSPITAL | Age: 83
Discharge: HOME | End: 2023-01-31
Attending: INTERNAL MEDICINE
Payer: MEDICARE

## 2023-01-31 DIAGNOSIS — J18.1 CONSOLIDATION OF LEFT LOWER LOBE OF LUNG: ICD-10-CM

## 2023-01-31 PROCEDURE — 71046 X-RAY EXAM CHEST 2 VIEWS: CPT | Mod: 26,,, | Performed by: RADIOLOGY

## 2023-01-31 PROCEDURE — 71046 X-RAY EXAM CHEST 2 VIEWS: CPT | Mod: TC,FY

## 2023-01-31 PROCEDURE — 71046 XR CHEST PA AND LATERAL: ICD-10-PCS | Mod: 26,,, | Performed by: RADIOLOGY

## 2023-01-31 NOTE — Clinical Note
See above ass/plan, let me know if questions   Skip [address] [place] [address]/[place]  Medication Management  ANTICOAGULATION    Referring Provider: Dr Charisma Lara INR: 2.0-3.0    TODAY'S INR: 2.1    WARFARIN Dosage: continue 7.5mg MF, 5mg all other days    INR (no units)   Date Value   2023 2.1   01/10/2023 3   2022 2   11/15/2022 1.9   10/04/2022 2.1   2022 1.9   2022 1.7       Medication changes:  No changes  Notes:    Fingerstick INR drawn per clinic protocol. Patient states no visible blood in urine and no black tarry stool. Denies any missed doses of warfarin. No change in other maintenance medications or in diet. Will recheck INR in 4 weeks. Patient acknowledges working in consult agreement with pharmacist as referred by his/her physician.                   For Pharmacy Admin Tracking Only    Intervention Detail: Adherence Monitorin  Total # of Interventions Recommended: 2  Total # of Interventions Accepted: 2  Time Spent (min): 20    Jasmin Best R.Ph., 2023,10:48 AM

## 2023-02-02 ENCOUNTER — CLINICAL SUPPORT (OUTPATIENT)
Dept: CARDIOLOGY | Facility: HOSPITAL | Age: 83
End: 2023-02-02
Payer: MEDICARE

## 2023-02-02 DIAGNOSIS — Z95.810 PRESENCE OF AUTOMATIC (IMPLANTABLE) CARDIAC DEFIBRILLATOR: ICD-10-CM

## 2023-02-02 PROCEDURE — 93296 REM INTERROG EVL PM/IDS: CPT | Performed by: INTERNAL MEDICINE

## 2023-02-07 DIAGNOSIS — Z00.00 ENCOUNTER FOR MEDICARE ANNUAL WELLNESS EXAM: ICD-10-CM

## 2023-02-09 ENCOUNTER — HOSPITAL ENCOUNTER (OUTPATIENT)
Dept: RADIOLOGY | Facility: HOSPITAL | Age: 83
Discharge: HOME | End: 2023-02-09
Attending: INTERNAL MEDICINE
Payer: MEDICARE

## 2023-02-09 DIAGNOSIS — R91.8 ABNORMALITY OF LUNG ON CXR: ICD-10-CM

## 2023-02-09 DIAGNOSIS — Z00.00 ENCOUNTER FOR MEDICARE ANNUAL WELLNESS EXAM: ICD-10-CM

## 2023-02-09 DIAGNOSIS — J90 PLEURAL EFFUSION: ICD-10-CM

## 2023-02-09 PROCEDURE — 71250 CT THORAX DX C-: CPT | Mod: 26,,, | Performed by: STUDENT IN AN ORGANIZED HEALTH CARE EDUCATION/TRAINING PROGRAM

## 2023-02-09 PROCEDURE — 71250 CT THORAX DX C-: CPT | Mod: TC

## 2023-02-09 PROCEDURE — 71250 CT CHEST WITHOUT CONTRAST: ICD-10-PCS | Mod: 26,,, | Performed by: STUDENT IN AN ORGANIZED HEALTH CARE EDUCATION/TRAINING PROGRAM

## 2023-02-11 NOTE — TELEPHONE ENCOUNTER
Bladder scan showed approx 450 mL retained urine. Per pt routine and order for straight cath per shift, Under sterile technique, straight catheterization performed with 14 fr,500 mL light yellow urine removed. Pt tolerated procedure well.    Spoke with patient, scheduled appointment.

## 2023-02-27 ENCOUNTER — OFFICE VISIT (OUTPATIENT)
Dept: OTOLARYNGOLOGY | Facility: CLINIC | Age: 83
End: 2023-02-27
Payer: MEDICARE

## 2023-02-27 VITALS
DIASTOLIC BLOOD PRESSURE: 68 MMHG | HEART RATE: 87 BPM | BODY MASS INDEX: 23 KG/M2 | WEIGHT: 125 LBS | SYSTOLIC BLOOD PRESSURE: 161 MMHG | HEIGHT: 62 IN

## 2023-02-27 DIAGNOSIS — H92.11 OTORRHEA OF RIGHT EAR: Primary | ICD-10-CM

## 2023-02-27 DIAGNOSIS — H69.90 DYSFUNCTION OF EUSTACHIAN TUBE, UNSPECIFIED LATERALITY: ICD-10-CM

## 2023-02-27 DIAGNOSIS — H60.312 CHRONIC DIFFUSE OTITIS EXTERNA OF LEFT EAR: ICD-10-CM

## 2023-02-27 DIAGNOSIS — H66.3X1 CHRONIC SUPPURATIVE OTITIS MEDIA OF RIGHT EAR, UNSPECIFIED OTITIS MEDIA LOCATION: ICD-10-CM

## 2023-02-27 DIAGNOSIS — H91.90 HEARING LOSS, UNSPECIFIED HEARING LOSS TYPE, UNSPECIFIED LATERALITY: ICD-10-CM

## 2023-02-27 PROCEDURE — 3288F FALL RISK ASSESSMENT DOCD: CPT | Mod: CPTII,S$GLB,, | Performed by: OTOLARYNGOLOGY

## 2023-02-27 PROCEDURE — 3078F DIAST BP <80 MM HG: CPT | Mod: CPTII,S$GLB,, | Performed by: OTOLARYNGOLOGY

## 2023-02-27 PROCEDURE — 1101F PR PT FALLS ASSESS DOC 0-1 FALLS W/OUT INJ PAST YR: ICD-10-PCS | Mod: CPTII,S$GLB,, | Performed by: OTOLARYNGOLOGY

## 2023-02-27 PROCEDURE — 1126F PR PAIN SEVERITY QUANTIFIED, NO PAIN PRESENT: ICD-10-PCS | Mod: CPTII,S$GLB,, | Performed by: OTOLARYNGOLOGY

## 2023-02-27 PROCEDURE — 92504 PR EAR MICROSCOPY EXAMINATION: ICD-10-PCS | Mod: S$GLB,,, | Performed by: OTOLARYNGOLOGY

## 2023-02-27 PROCEDURE — 99999 PR PBB SHADOW E&M-EST. PATIENT-LVL V: CPT | Mod: PBBFAC,,, | Performed by: OTOLARYNGOLOGY

## 2023-02-27 PROCEDURE — 92504 EAR MICROSCOPY EXAMINATION: CPT | Mod: S$GLB,,, | Performed by: OTOLARYNGOLOGY

## 2023-02-27 PROCEDURE — 99214 PR OFFICE/OUTPT VISIT, EST, LEVL IV, 30-39 MIN: ICD-10-PCS | Mod: 25,S$GLB,, | Performed by: OTOLARYNGOLOGY

## 2023-02-27 PROCEDURE — 99999 PR PBB SHADOW E&M-EST. PATIENT-LVL V: ICD-10-PCS | Mod: PBBFAC,,, | Performed by: OTOLARYNGOLOGY

## 2023-02-27 PROCEDURE — 3077F PR MOST RECENT SYSTOLIC BLOOD PRESSURE >= 140 MM HG: ICD-10-PCS | Mod: CPTII,S$GLB,, | Performed by: OTOLARYNGOLOGY

## 2023-02-27 PROCEDURE — 1159F PR MEDICATION LIST DOCUMENTED IN MEDICAL RECORD: ICD-10-PCS | Mod: CPTII,S$GLB,, | Performed by: OTOLARYNGOLOGY

## 2023-02-27 PROCEDURE — 99214 OFFICE O/P EST MOD 30 MIN: CPT | Mod: 25,S$GLB,, | Performed by: OTOLARYNGOLOGY

## 2023-02-27 PROCEDURE — 3288F PR FALLS RISK ASSESSMENT DOCUMENTED: ICD-10-PCS | Mod: CPTII,S$GLB,, | Performed by: OTOLARYNGOLOGY

## 2023-02-27 PROCEDURE — 1159F MED LIST DOCD IN RCRD: CPT | Mod: CPTII,S$GLB,, | Performed by: OTOLARYNGOLOGY

## 2023-02-27 PROCEDURE — 1126F AMNT PAIN NOTED NONE PRSNT: CPT | Mod: CPTII,S$GLB,, | Performed by: OTOLARYNGOLOGY

## 2023-02-27 PROCEDURE — 3078F PR MOST RECENT DIASTOLIC BLOOD PRESSURE < 80 MM HG: ICD-10-PCS | Mod: CPTII,S$GLB,, | Performed by: OTOLARYNGOLOGY

## 2023-02-27 PROCEDURE — 3077F SYST BP >= 140 MM HG: CPT | Mod: CPTII,S$GLB,, | Performed by: OTOLARYNGOLOGY

## 2023-02-27 PROCEDURE — 1101F PT FALLS ASSESS-DOCD LE1/YR: CPT | Mod: CPTII,S$GLB,, | Performed by: OTOLARYNGOLOGY

## 2023-02-27 RX ORDER — CIPROFLOXACIN AND DEXAMETHASONE 3; 1 MG/ML; MG/ML
4 SUSPENSION/ DROPS AURICULAR (OTIC) 2 TIMES DAILY
Qty: 7.5 ML | Refills: 1 | Status: SHIPPED | OUTPATIENT
Start: 2023-02-27 | End: 2023-03-09

## 2023-02-27 RX ORDER — FLUTICASONE PROPIONATE 50 MCG
1 SPRAY, SUSPENSION (ML) NASAL 2 TIMES DAILY
Qty: 11.1 ML | Refills: 11 | Status: SHIPPED | OUTPATIENT
Start: 2023-02-27 | End: 2023-03-14 | Stop reason: SDUPTHER

## 2023-02-27 RX ORDER — DOXYCYCLINE HYCLATE 100 MG
100 TABLET ORAL 2 TIMES DAILY
Qty: 28 TABLET | Refills: 0 | Status: SHIPPED | OUTPATIENT
Start: 2023-02-27 | End: 2023-03-13

## 2023-02-27 NOTE — PATIENT INSTRUCTIONS
Restart flonase nasal spray.     Use ciprodex drops in right ear twice daily.   Start doxycycline for ear and sinus infection.

## 2023-02-27 NOTE — PROGRESS NOTES
Chief Complaint   Patient presents with    Ear Drainage    Ear Fullness       HPI:  Patient is a 82 y.o. female who has previously seen my partner Dr. Miranda and myself for chronic otitis media, ETD, BRITTNEY, mixed HL.  She had been doing well over the last few years with control of symptoms, but reports worsening of her hearing over the last few months/years.  She has noted ear drainage over the last few weeks, but has not been using any ear drops.  She has not been using her flonase for control of nasal symptoms, and has had more nasal congestion and drainage lately, too.        Active Ambulatory Problems     Diagnosis Date Noted    History of nonmelanoma skin cancer 06/28/2012    LBBB (left bundle branch block) 07/25/2012    Nontoxic multinodular goiter 08/24/2012    Meningioma 03/14/2013    Asthma, chronic 06/05/2013    Biventricular ICD (implantable cardioverter-defibrillator) in place 01/09/2014    Dyspnea 06/30/2014    Tremor 07/22/2014    Hypertension associated with diabetes 07/20/2015    History of breast cancer 02/02/2016    Adrenal cortical nodule: repeat CT 6/2016 03/16/2016    Calcification of aorta 03/16/2016    Diabetic polyneuropathy associated with type 2 diabetes mellitus 03/16/2016    Osteoporosis 01/26/2017    KHOA (obstructive sleep apnea) 01/26/2017    Iron deficiency anemia 05/16/2017    Chemotherapy-induced neuropathy 05/18/2017    Hypertensive retinopathy, bilateral 02/22/2018    Multiple lung nodules 04/04/2018    Chronic obstructive pulmonary disease with acute exacerbation 04/04/2018    Mixed conductive and sensorineural hearing loss 04/04/2018    Weakness     Cardiomyopathy, ischemic 01/21/2019    Metabolic bone disease 01/21/2019    Seasonal allergies 06/28/2019    Hyperlipidemia associated with type 2 diabetes mellitus 07/27/2020    Mild nonproliferative retinopathy due to secondary diabetes 07/27/2020    Macular edema due to secondary diabetes 07/27/2020    Moderate asthma 10/01/2020     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, with long-term current use of insulin 02/22/2018    Chronic combined systolic and diastolic heart failure 03/23/2021    Obstructive sleep apnea 07/16/2021    Personal history of COVID-19 07/23/2021    Current mild episode of major depressive disorder without prior episode 01/25/2022    Microalbuminuria due to type 2 diabetes mellitus 01/26/2022    Urinary retention 02/28/2022    Hyponatremia 04/02/2022    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    Mycobacterium avium complex     Diet-controlled diabetes mellitus 04/26/2022    History of non anemic vitamin B12 deficiency 04/26/2022    Vitreous hemorrhage, left 02/22/2018    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    Acute on chronic combined systolic and diastolic CHF (congestive heart failure) 06/20/2022    COVID-19 virus infection 06/20/2022    Essential hypertension 06/20/2022    Type 2 diabetes mellitus, with long-term current use of insulin 06/20/2022    Acute hypoxemic respiratory failure 06/21/2022    UTI (urinary tract infection) 06/21/2022    ACP (advance care planning) 07/08/2022    HAP (hospital-acquired pneumonia) 07/08/2022    CKD (chronic kidney disease) stage 4, GFR 15-29 ml/min 07/08/2022    Pleural effusion     Pneumonia of left lower lobe due to Pseudomonas species 07/14/2022    Type 2 diabetes mellitus with stage 3b chronic kidney disease, with long-term current use of insulin 10/07/2022     Resolved Ambulatory Problems     Diagnosis Date Noted    AK (actinic keratosis) 06/28/2012    Nonischemic cardiomyopathy 07/25/2012    Chronic systolic heart failure 07/26/2012    Type II or unspecified type diabetes mellitus with neurological manifestations, uncontrolled 08/24/2012    Senile osteopenia 08/24/2012    Defibrillator discharge 12/20/2012    Breast cancer 01/23/2013    Ventricular tachycardia 01/29/2013     Osteoporosis, post-menopausal 04/02/2013    Senile osteopenia 04/02/2013    Coronary artery disease involving native coronary artery without angina pectoris - Non-obstructive 50% LAD 07/20/2015    DM (diabetes mellitus), type 2, uncontrolled w/neurologic complication 01/07/2016    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    Uncontrolled type 2 diabetes mellitus with hyperglycemia 11/09/2016    Type 2 diabetes mellitus with stage 3 chronic kidney disease, with long-term current use of insulin 01/26/2017    Chronic kidney disease, stage 3, mod decreased GFR 02/14/2017    Hypomagnesemia 08/31/2017    Numbness     ICD (implantable cardioverter-defibrillator) battery depletion 12/17/2018    Type 2 DM with CKD stage 3 and hypertension 01/21/2019    Proteinuria 01/21/2019    Colon cancer screening 11/05/2019    Pseudomonas pneumonia 12/07/2019    Acute renal failure superimposed on stage 4 chronic kidney disease 12/07/2019    Acute right-sided thoracic back pain 12/09/2019    CAP (community acquired pneumonia) due to Pseudomonas species 12/17/2019    Rib fracture 12/17/2019    Parapneumonic effusion 12/18/2019    Acute respiratory failure with hypoxia and hypercarbia 12/19/2019    Overweight (BMI 25.0-29.9) 07/27/2020    Pneumonia due to COVID-19 virus 12/06/2020    Hypoxia 03/23/2021    Hyperlipidemia 03/23/2021    Pneumonia of both lungs due to infectious organism 03/23/2021    Hypertensive emergency 03/23/2021    Chronic combined systolic and diastolic heart failure 04/06/2021    Chronic bronchitis, unspecified chronic bronchitis type 01/25/2022    White coat syndrome with diagnosis of hypertension 02/11/2022    Acute hyponatremia 02/24/2022    Metabolic acidosis 02/24/2022    Cough 03/03/2022    Hypothermia 04/02/2022    Acute pyelonephritis 04/02/2022    Sepsis 04/02/2022    Hyperosmolar hyperglycemic state (HHS) 06/22/2022     Past Medical History:   Diagnosis Date    Allergy     Asthma     Basal cell carcinoma      Basal cell carcinoma     Basal cell carcinoma 05/20/2015    Basal cell carcinoma 12/22/2015    Basal cell carcinoma 12/03/2019    Basal cell carcinoma of right ala nasi 10/20/2022    Bilateral pleural effusion     Bilateral renal cysts     CAD (coronary artery disease)     Cardiomyopathy     Cataract     CHF (congestive heart failure)     Colon polyp 2011    COPD (chronic obstructive pulmonary disease)     COPD exacerbation 04/08/2018    Diabetes mellitus     Diabetes mellitus type II     Diabetes with neurologic complications     Edema     Goiter     Hematuria, unspecified     HX: breast cancer     Hypertension     Hypocalcemia     Hypokalemia     Left kidney mass     Osteoporosis, postmenopausal     Pneumonia 12/08/2019    Skin cancer     Sleep apnea     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/2015    Unspecified vitamin D deficiency     Vitamin B12 deficiency     Vitamin D deficiency disease        Review of Systems  General: negative for chills, fever or weight loss  Psychological: negative for mood changes or depression  Ophthalmic: negative for blurry vision, photophobia or eye pain  ENT: see HPI  Respiratory: no cough, shortness of breath, or wheezing  Cardiovascular: no chest pain or dyspnea on exertion  Gastrointestinal: no abdominal pain, change in bowel habits, or black/ bloody stools  Musculoskeletal: negative for gait disturbance or muscular weakness  Neurological: no syncope or seizures; no ataxia  Dermatological: negative for pruritis, rash and jaundice  Hematologic/lymphatic: no easy bruising, no new adenopathy    Physical Exam     Vitals:    02/27/23 1434   BP: (!) 161/68   Pulse: 87         Constitutional:   She is oriented to person, place, and time. Vital signs are normal. She appears well-developed and well-nourished. She appears alert. She is cooperative. Normal speech.      Head:  Normocephalic and atraumatic. Salivary glands normal.  Facial strength is normal.      Ears:  Hearing normal to normal  and whispered voice; external ear normal without scars, lesions, or masses; ear canal, tympanic membrane, and middle ear normal., right ear hearing normal to normal and whispered voice; external ear normal without scars, lesions, or masses; ear canal, tympanic membrane, and middle ear normal. and left ear hearing normal to normal and whispered voice; external ear normal without scars, lesions, or masses; ear canal, tympanic membrane, and middle ear normal..   Right Ear: There is drainage. Tympanic membrane is injected, perforated, erythematous and retracted. A middle ear effusion is present.   Left Ear: Tympanic membrane is injected, erythematous and retracted. Tympanic membrane is not perforated. A middle ear effusion is present.   Ears:    Small perforation noted on diagram.  Otorrhea suctioned from EAC.      Dry skin and skin irritation bilateral ear canals, consistent with chronic otitis externa.      Nose:  Mucosal edema and rhinorrhea present. No septal deviation or polyps. Turbinates abnormal and turbinate hypertrophy (3+, boggy, congested mucosa).  Right sinus exhibits no maxillary sinus tenderness and no frontal sinus tenderness. Left sinus exhibits no maxillary sinus tenderness and no frontal sinus tenderness.     Mouth/Throat  Oropharynx clear and moist without lesions or asymmetry, lips, teeth, and gums normal and oropharynx normal. No mucous membrane lesions. No oropharyngeal exudate, posterior oropharyngeal edema or posterior oropharyngeal erythema. +1.  Tonsillar erythema, tonsillar exudate.  Mirror exam not performed due to patient tolerance.  Mirror exam not performed due to patient tolerance.      Neck:  Neck normal without thyromegaly masses, asymmetry, normal tracheal structure, crepitus, and tenderness, thyroid normal, trachea normal, phonation normal, full range of motion with neck supple and no adenopathy. No JVD present. Carotid bruit is not present. No thyroid mass and no thyromegaly  present.     She has no cervical adenopathy.     Cardiovascular:    Normal rate, regular rhythm and rate and rhythm, heart sounds, and pulses normal.              Pulmonary/Chest:   Effort and breath sounds normal.     Psychiatric:   She has a normal mood and affect. Her speech is normal and behavior is normal.     Neurological:   She is alert and oriented to person, place, and time. She has neurological normal, alert and oriented. No cranial nerve deficit.     Skin:   No abrasions, lacerations, lesions, or rashes.     Binocular Microscopic Ear Exam:  Preprocedure diagnosis:  History of chronic ear disease and otorrhea  Postprocedure diagnosis:   Same  Procedure:  Ear examination and cleaning  Complications:    None    Procedure in detail:  The patient was seated in the exam chair, and the binocular microscope was used to examine the right ear.  Speculum was inserted into the right EAC.  She was noted to have a retracted TM on the right, with scarring down onto the middle ear structures.   Using a suction, the purulnet otorrhea was suctioned from her EAC.  A small perforation of the TM was note in the anterior inferior quadrant, occupying <5% of the TM.    There were no abnormalities of the EAC noted, other than previous changes of BRITTNEY noted.    Patient tolerated the procedure well.        Assessment/Plan:      ICD-10-CM ICD-9-CM    1. Otorrhea of right ear  H92.11 388.60 doxycycline (VIBRA-TABS) 100 MG tablet      ciprofloxacin-dexAMETHasone 0.3-0.1% (CIPRODEX) 0.3-0.1 % DrpS      fluticasone propionate (FLONASE) 50 mcg/actuation nasal spray      2. Chronic suppurative otitis media of right ear, unspecified otitis media location  H66.3X1 382.3 doxycycline (VIBRA-TABS) 100 MG tablet      ciprofloxacin-dexAMETHasone 0.3-0.1% (CIPRODEX) 0.3-0.1 % DrpS      fluticasone propionate (FLONASE) 50 mcg/actuation nasal spray      3. Dysfunction of Eustachian tube, unspecified laterality  H69.80 381.81 fluticasone propionate  (FLONASE) 50 mcg/actuation nasal spray      4. Hearing loss, unspecified hearing loss type, unspecified laterality  H91.90 389.9       5. Chronic diffuse otitis externa of left ear  H60.312 380.23           Doxycycline for sinusitis and right OM with perfortion.    Ciprodex otic drops in right ear.    Restart flonase nasal spray.      Follow up in 3-4 weeks for recheck of ear.      Patient has appt with Dr Carla Moscoso in March to recheck an adjust hearing aids.      January Cao MD  [facility]

## 2023-03-07 ENCOUNTER — OFFICE VISIT (OUTPATIENT)
Dept: PULMONOLOGY | Facility: CLINIC | Age: 83
End: 2023-03-07
Payer: MEDICARE

## 2023-03-07 VITALS
DIASTOLIC BLOOD PRESSURE: 58 MMHG | WEIGHT: 125.25 LBS | OXYGEN SATURATION: 96 % | HEART RATE: 85 BPM | HEIGHT: 62 IN | BODY MASS INDEX: 23.05 KG/M2 | SYSTOLIC BLOOD PRESSURE: 109 MMHG

## 2023-03-07 DIAGNOSIS — R91.8 MULTIPLE LUNG NODULES: Primary | ICD-10-CM

## 2023-03-07 DIAGNOSIS — I50.42 CHRONIC COMBINED SYSTOLIC AND DIASTOLIC HEART FAILURE: ICD-10-CM

## 2023-03-07 DIAGNOSIS — J45.50 SEVERE PERSISTENT CHRONIC ASTHMA WITHOUT COMPLICATION: ICD-10-CM

## 2023-03-07 DIAGNOSIS — J90 PLEURAL EFFUSION: ICD-10-CM

## 2023-03-07 DIAGNOSIS — B44.81 ABPA (ALLERGIC BRONCHOPULMONARY ASPERGILLOSIS): ICD-10-CM

## 2023-03-07 DIAGNOSIS — J30.2 SEASONAL ALLERGIES: ICD-10-CM

## 2023-03-07 PROCEDURE — 3074F PR MOST RECENT SYSTOLIC BLOOD PRESSURE < 130 MM HG: ICD-10-PCS | Mod: CPTII,S$GLB,, | Performed by: INTERNAL MEDICINE

## 2023-03-07 PROCEDURE — 1159F MED LIST DOCD IN RCRD: CPT | Mod: CPTII,S$GLB,, | Performed by: INTERNAL MEDICINE

## 2023-03-07 PROCEDURE — 3078F DIAST BP <80 MM HG: CPT | Mod: CPTII,S$GLB,, | Performed by: INTERNAL MEDICINE

## 2023-03-07 PROCEDURE — 1160F PR REVIEW ALL MEDS BY PRESCRIBER/CLIN PHARMACIST DOCUMENTED: ICD-10-PCS | Mod: CPTII,S$GLB,, | Performed by: INTERNAL MEDICINE

## 2023-03-07 PROCEDURE — 1126F AMNT PAIN NOTED NONE PRSNT: CPT | Mod: CPTII,S$GLB,, | Performed by: INTERNAL MEDICINE

## 2023-03-07 PROCEDURE — 3288F PR FALLS RISK ASSESSMENT DOCUMENTED: ICD-10-PCS | Mod: CPTII,S$GLB,, | Performed by: INTERNAL MEDICINE

## 2023-03-07 PROCEDURE — 1160F RVW MEDS BY RX/DR IN RCRD: CPT | Mod: CPTII,S$GLB,, | Performed by: INTERNAL MEDICINE

## 2023-03-07 PROCEDURE — 99999 PR PBB SHADOW E&M-EST. PATIENT-LVL III: CPT | Mod: PBBFAC,,, | Performed by: INTERNAL MEDICINE

## 2023-03-07 PROCEDURE — 1101F PT FALLS ASSESS-DOCD LE1/YR: CPT | Mod: CPTII,S$GLB,, | Performed by: INTERNAL MEDICINE

## 2023-03-07 PROCEDURE — 99999 PR PBB SHADOW E&M-EST. PATIENT-LVL III: ICD-10-PCS | Mod: PBBFAC,,, | Performed by: INTERNAL MEDICINE

## 2023-03-07 PROCEDURE — 99215 PR OFFICE/OUTPT VISIT, EST, LEVL V, 40-54 MIN: ICD-10-PCS | Mod: S$GLB,,, | Performed by: INTERNAL MEDICINE

## 2023-03-07 PROCEDURE — 1101F PR PT FALLS ASSESS DOC 0-1 FALLS W/OUT INJ PAST YR: ICD-10-PCS | Mod: CPTII,S$GLB,, | Performed by: INTERNAL MEDICINE

## 2023-03-07 PROCEDURE — 3078F PR MOST RECENT DIASTOLIC BLOOD PRESSURE < 80 MM HG: ICD-10-PCS | Mod: CPTII,S$GLB,, | Performed by: INTERNAL MEDICINE

## 2023-03-07 PROCEDURE — 3288F FALL RISK ASSESSMENT DOCD: CPT | Mod: CPTII,S$GLB,, | Performed by: INTERNAL MEDICINE

## 2023-03-07 PROCEDURE — 1159F PR MEDICATION LIST DOCUMENTED IN MEDICAL RECORD: ICD-10-PCS | Mod: CPTII,S$GLB,, | Performed by: INTERNAL MEDICINE

## 2023-03-07 PROCEDURE — 1126F PR PAIN SEVERITY QUANTIFIED, NO PAIN PRESENT: ICD-10-PCS | Mod: CPTII,S$GLB,, | Performed by: INTERNAL MEDICINE

## 2023-03-07 PROCEDURE — 3074F SYST BP LT 130 MM HG: CPT | Mod: CPTII,S$GLB,, | Performed by: INTERNAL MEDICINE

## 2023-03-07 PROCEDURE — 99215 OFFICE O/P EST HI 40 MIN: CPT | Mod: S$GLB,,, | Performed by: INTERNAL MEDICINE

## 2023-03-07 NOTE — PROGRESS NOTES
Subjective:       Patient ID: Myesha Quinones is a 82 y.o. female.    Chief Complaint: No chief complaint on file.    HPI   Myesha Quinones has a past medical history of CAD, combined systolic and diastolic heart failure, LBBB, biventricular ICD, CKD4, IDDM, breast cancer, HTN, HLD, severe persistent asthma, HLD, who is here by virtual visit for continued shortness of breath, asthmatic symptoms, congestion and abnormal CT thorax.  Currently taking Spiriva, Wixela (250-50), Albuterol, Duonebs, Singulair, Flonase.      Tobacco/vape: never  Occupation: stayed at home  Exertional capacity: walks with a rolling walker to the parking garage  Prior lung disease: ABPA, asthma, MAC infection  Sputum production: no longer making sputum  Allergies/sinusitis: taking flonase.  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  Prior hospitalization/intubation: July 2022.     Today she is following up for pleural effusions noted on recent imaging.  She had effusions with sampling of left effusion in 7/11/22.  Fluid studies showed exudate by LDH, but cytology was negative.  She had recurrent effusion noted on chest x-ray from 1/31/2023, and follow up CT chest on 2/9/2023 showed improvement in volume.  Bedside US shows persistent left pleural effusion (mild-moderate) and no effusion on right.  Her clinical condition is the best since manuel met her and she states she is breathing the best she has in years.    Review of Systems   Constitutional:  Negative for fever, chills, activity change, appetite change and night sweats.   HENT:  Negative for postnasal drip, rhinorrhea, sinus pressure and congestion.    Eyes: Negative.    Respiratory:  Positive for sputum production and dyspnea on extertion. Negative for cough, shortness of breath, wheezing, asthma  "nighttime symptoms and use of rescue inhaler.    Cardiovascular:  Negative for chest pain, palpitations and leg swelling.   Genitourinary: Negative.    Endocrine: endocrine negative    Musculoskeletal: Negative.    Skin: Negative.    Gastrointestinal:  Negative for nausea, vomiting, abdominal pain, abdominal distention and acid reflux.   Neurological: Negative.    Psychiatric/Behavioral: Negative.       Objective:       Vitals:    03/07/23 1426   BP: (!) 109/58   BP Location: Left arm   Patient Position: Sitting   BP Method: Medium (Manual)   Pulse: 85   SpO2: 96%   Weight: 56.8 kg (125 lb 3.5 oz)   Height: 5' 2" (1.575 m)           Physical Exam   Constitutional: She is oriented to person, place, and time. She appears well-developed and well-nourished. She appears not cachectic. No distress.   HENT:   Head: Normocephalic.   Neck: No JVD present.   Cardiovascular: Normal rate, regular rhythm and normal heart sounds. Exam reveals no gallop and no friction rub.   No murmur heard.  Pulmonary/Chest: Normal expansion and symmetric chest wall expansion. No respiratory distress. She has no decreased breath sounds. She has no wheezes. She has no rhonchi. She has no rales.   Diminished breath sounds to the LLL.  Mild-mod pleural effusion noted on US of left lung.  No effusion on right. Negative for egophony. Negative for tactile fremitus.   Abdominal: Soft. Bowel sounds are normal. She exhibits no distension.   Musculoskeletal:         General: Edema present. Normal range of motion.      Cervical back: Normal range of motion.   Neurological: She is alert and oriented to person, place, and time.   Skin: Skin is warm and dry. She is not diaphoretic.   Psychiatric: She has a normal mood and affect. Her behavior is normal. Judgment and thought content normal.      Personal Diagnostic Review    CT Chest 2/9/2023  Mild to moderate-sized dependent left pleural effusion, improved from previous CT.  RUL and RML tree-in-bud that is " stable and improving, likely from known MAC infection.  Subsegmental atelectasis in lingula. Bandlike atelectasis in the bases bilaterally.  Overall, improved from previous studies.    Chest x-ray 1/31/2023  Left chest multi lead pacer device.  Cardiac silhouette unchanged.  Chronic interstitial coarsening.  Continued opacification at the left lower lung which could relate to component of underlying consolidation and/or effusion.  Right-sided pleural effusion and/or thickening at the costophrenic margin, more conspicuous.  Vague ill-defined opacity at the peripheral right mid lung, more conspicuous from prior and possibly pleural based.  Suggest correlation with dedicated cross-sectional CT.  No gross pneumothorax.    CT thorax 2/26/2022:  Obliteration of the right bronchus intermedius and bilateral lower lobes basal segments bronchi possibly by mucous, aspiration or pneumonia.  There is complete volume loss of the middle lobe and patchy subsegmental atelectasis or airspace disease of the basal segments of the bilateral lower lobes right more than left.  Consider aspiration or pneumonia.  Enlarged precarinal and sub carinal nodes could be reactive to the underlying inflammatory process, less likely a neoplastic process not excluded.  Right upper lobe pleural base nodule, For a solid nodule >8 mm, Fleischner Society 2017 guidelines recommend considering follow-up CT at 3 months.  Significant coronary artery calcifications.  Bilateral small pleural effusion left more than right.  Bilateral benign adrenal adenoma.  Right thyroid lobe nodule slightly larger compared to the prior chest CT.  Ultrasound could be done if clinically warranted.    PFTs 10/2020:  FEV1/FVC - 50%  FEV1 - 0.85L (50%)  FVC - 1.71L (76%)  TLC - 2.95L (66%)  DLCO - 56%  Bronchodilator - no significant response    6MWT 10/2020:  No significant desaturation.  Walked 200feet with initial sat 97% and drop to 93% with exertion.  Recovered to 97% on room  air.     No flowsheet data found.      Assessment:       1. Multiple lung nodules    2. Severe persistent chronic asthma without complication    3. Pleural effusion    4. Seasonal allergies    5. ABPA (allergic bronchopulmonary aspergillosis)    6. Chronic combined systolic and diastolic heart failure          Outpatient Encounter Medications as of 3/7/2023   Medication Sig Dispense Refill    acetaminophen (TYLENOL) 500 MG tablet Take 500 mg by mouth as needed.      albuterol (PROVENTIL/VENTOLIN HFA) 90 mcg/actuation inhaler Inhale 2 puffs into the lungs every 6 (six) hours as needed for Wheezing. Rescue 18 g 12    albuterol-ipratropium (DUO-NEB) 2.5 mg-0.5 mg/3 mL nebulizer solution TAKE 3 MLS BY NEBULIZATION EVERY 4 (FOUR) HOURS AS NEEDED FOR WHEEZING. 270 mL 12    aspirin 81 MG Chew Take 81 mg by mouth once daily.      blood sugar diagnostic (ACCU-CHEK HECTOR) Strp Uses Accu-Check Hector meter to test BG 4x/day 400 strip 6    carvediloL (COREG) 25 MG tablet TAKE 2 TABLETS (50 MG TOTAL) BY MOUTH 2 (TWO) TIMES DAILY. 360 tablet 3    cholecalciferol, vitamin D3, (VITAMIN D3) 50 mcg (2,000 unit) Tab Take 1 tablet by mouth once daily.       ciprofloxacin-dexAMETHasone 0.3-0.1% (CIPRODEX) 0.3-0.1 % DrpS Place 4 drops into the right ear 2 (two) times daily. for 10 days 7.5 mL 1    cyanocobalamin (VITAMIN B-12) 100 MCG tablet Take 100 mcg by mouth once daily.      diltiaZEM HCl (CARDIZEM LA) 240 mg 24 hr tablet Take 240 mg by mouth once daily.      doxycycline (VIBRA-TABS) 100 MG tablet Take 1 tablet (100 mg total) by mouth 2 (two) times daily. for 14 days 28 tablet 0    dulaglutide (TRULICITY) 1.5 mg/0.5 mL pen injector Inject 1.5 mg into the skin every 7 days. 4 pen 11    estradioL (ESTRACE) 0.01 % (0.1 mg/gram) vaginal cream Place 1 g vaginally every other day. 42.5 g 11    fluticasone propionate (FLONASE) 50 mcg/actuation nasal spray 1 spray (50 mcg total) by Each Nostril route 2 (two) times a day. 11.1 mL 11     "furosemide (LASIX) 40 MG tablet Take 1 tablet (40 mg total) by mouth once daily. 90 tablet 3    hydrALAZINE (APRESOLINE) 100 MG tablet Take 1 tablet (100 mg total) by mouth 2 (two) times daily. 90 tablet 3    insulin (LANTUS SOLOSTAR U-100 INSULIN) glargine 100 units/mL SubQ pen Inject 10 Units into the skin every evening. 30 mL 3    lancets (ACCU-CHEK SOFTCLIX LANCETS) Misc Uses Accu-Chek Angelica meter to test BG 2x/day 400 each 6    lisinopriL (PRINIVIL,ZESTRIL) 5 MG tablet Take 5 mg by mouth once daily.      magnesium oxide (MAG-OX) 400 mg tablet Take 1 tablet (400 mg total) by mouth once daily. (Patient taking differently: Take 400 mg by mouth 3 (three) times daily.)  0    montelukast (SINGULAIR) 10 mg tablet Take 1 tablet (10 mg total) by mouth once daily. 90 tablet 3    nitroGLYCERIN (NITROSTAT) 0.4 MG SL tablet Place 0.4 mg under the tongue every 5 (five) minutes as needed for Chest pain.       omega-3 fatty acids 500 mg Cap Take 1 capsule by mouth Daily.      pen needle, diabetic (BD ULTRA-FINE MINI PEN NEEDLE) 31 gauge x 3/16" Ndle Use with insulin twice a day. 400 each 3    polyethylene glycol (GLYCOLAX) 17 gram PwPk Take 17 g by mouth daily as needed (constipation). 10 each 1    pravastatin (PRAVACHOL) 40 MG tablet TAKE 1 TABLET BY MOUTH EVERY DAY 90 tablet 3    pulse oximeter (PULSE OXIMETER) device by Apply Externally route 2 (two) times a day. Use twice daily at 8 AM and 3 PM and record the value in [facility] as directed. 1 each 0    sodium bicarbonate 650 MG tablet Take 2 tablets (1,300 mg total) by mouth 3 (three) times daily. (Patient taking differently: Take 1,300 mg by mouth 2 (two) times daily.) 180 tablet 11    valsartan (DIOVAN) 80 MG tablet Take 1 tablet (80 mg total) by mouth once daily. (Patient taking differently: Take 80 mg by mouth 2 (two) times daily.) 90 tablet 3    [DISCONTINUED] fluticasone propionate (FLONASE) 50 mcg/actuation nasal spray 2 sprays (100 mcg total) by Each Nostril route " once daily. 16 g 12     No facility-administered encounter medications on file as of 3/7/2023.     Orders Placed This Encounter   Procedures    CT Chest Without Contrast     Standing Status:   Future     Standing Expiration Date:   3/7/2024     Order Specific Question:   May the Radiologist modify the order per protocol to meet the clinical needs of the patient?     Answer:   Yes       Plan:       Problem List Items Addressed This Visit          ENT    Seasonal allergies    Overview     Continue flonase              Pulmonary    Asthma, chronic    Overview     - continue Albuterol, Duonebs, Singulair, Flonase.  No longer on maintenance inhalers.         Multiple lung nodules - Primary    Overview     Improved from previous evaluation.  Would repeat CT thorax in 6 months time for re-evaluation of current findings, monitoring of tree-in-bud opacities, mucous impaction, pleural effusions, and bandlike atelectasis.  Planning on repeat in 9/2023         Pleural effusion    Overview     Previously underwent thoracentesis on left in 7/2022.  Results were exudative, but negative cytology.  She now has the best breathing status and control of her heart failure that she has had since I have met her.  She is breathing well and content with current clinical condition.  Her CT thorax from 2/2023 shows improvement in pleural fluid burden in comparison to studies obtained 2 weeks prior on x-ray.  I feel her current heart failure plan is working.  - I offered a thoracentesis in clinic today for removal of the pleural fluid on the left.  After prolonged discussion and shared-decision making, we decided to continue medical management with heart failure and diuretic therapy.  She was advised that if her shortness of breath worsens, or other symptoms arise, to notify me for repeat imaging and possible intervention.            Cardiac/Vascular    Chronic combined systolic and diastolic heart failure    Overview     - continue  optimization of BP and heart failure as possible.            ID    ABPA (allergic bronchopulmonary aspergillosis)    Overview     Unable to take steroidds due to hyperglycemia and recurrent heart failure.  Tried voriconazole with ID, but was unable to tolerate.  Now utilizing sputum clearance maneuvers and inhaler therapy for her asthma control with good effect. Continue.              6 month repeat CT with follow up at that time or sooner PRN.    Klevin Maki MD  [facility]

## 2023-03-14 ENCOUNTER — OFFICE VISIT (OUTPATIENT)
Dept: OTOLARYNGOLOGY | Facility: CLINIC | Age: 83
End: 2023-03-14
Payer: MEDICARE

## 2023-03-14 VITALS
WEIGHT: 126.63 LBS | HEART RATE: 69 BPM | SYSTOLIC BLOOD PRESSURE: 115 MMHG | HEIGHT: 62 IN | DIASTOLIC BLOOD PRESSURE: 52 MMHG | BODY MASS INDEX: 23.3 KG/M2

## 2023-03-14 DIAGNOSIS — H92.11 OTORRHEA OF RIGHT EAR: ICD-10-CM

## 2023-03-14 DIAGNOSIS — J34.2 DEVIATED NASAL SEPTUM: Chronic | ICD-10-CM

## 2023-03-14 DIAGNOSIS — H91.90 HEARING LOSS, UNSPECIFIED HEARING LOSS TYPE, UNSPECIFIED LATERALITY: Chronic | ICD-10-CM

## 2023-03-14 DIAGNOSIS — H66.3X1 CHRONIC SUPPURATIVE OTITIS MEDIA OF RIGHT EAR, UNSPECIFIED OTITIS MEDIA LOCATION: Chronic | ICD-10-CM

## 2023-03-14 DIAGNOSIS — J31.0 CHRONIC RHINITIS: Chronic | ICD-10-CM

## 2023-03-14 DIAGNOSIS — H69.93 DYSFUNCTION OF BOTH EUSTACHIAN TUBES: Primary | Chronic | ICD-10-CM

## 2023-03-14 PROCEDURE — 1126F AMNT PAIN NOTED NONE PRSNT: CPT | Mod: CPTII,S$GLB,, | Performed by: OTOLARYNGOLOGY

## 2023-03-14 PROCEDURE — 3078F PR MOST RECENT DIASTOLIC BLOOD PRESSURE < 80 MM HG: ICD-10-PCS | Mod: CPTII,S$GLB,, | Performed by: OTOLARYNGOLOGY

## 2023-03-14 PROCEDURE — 3288F FALL RISK ASSESSMENT DOCD: CPT | Mod: CPTII,S$GLB,, | Performed by: OTOLARYNGOLOGY

## 2023-03-14 PROCEDURE — 1126F PR PAIN SEVERITY QUANTIFIED, NO PAIN PRESENT: ICD-10-PCS | Mod: CPTII,S$GLB,, | Performed by: OTOLARYNGOLOGY

## 2023-03-14 PROCEDURE — 3074F PR MOST RECENT SYSTOLIC BLOOD PRESSURE < 130 MM HG: ICD-10-PCS | Mod: CPTII,S$GLB,, | Performed by: OTOLARYNGOLOGY

## 2023-03-14 PROCEDURE — 99214 PR OFFICE/OUTPT VISIT, EST, LEVL IV, 30-39 MIN: ICD-10-PCS | Mod: S$GLB,,, | Performed by: OTOLARYNGOLOGY

## 2023-03-14 PROCEDURE — 99999 PR PBB SHADOW E&M-EST. PATIENT-LVL V: ICD-10-PCS | Mod: PBBFAC,,, | Performed by: OTOLARYNGOLOGY

## 2023-03-14 PROCEDURE — 1159F MED LIST DOCD IN RCRD: CPT | Mod: CPTII,S$GLB,, | Performed by: OTOLARYNGOLOGY

## 2023-03-14 PROCEDURE — 1159F PR MEDICATION LIST DOCUMENTED IN MEDICAL RECORD: ICD-10-PCS | Mod: CPTII,S$GLB,, | Performed by: OTOLARYNGOLOGY

## 2023-03-14 PROCEDURE — 3288F PR FALLS RISK ASSESSMENT DOCUMENTED: ICD-10-PCS | Mod: CPTII,S$GLB,, | Performed by: OTOLARYNGOLOGY

## 2023-03-14 PROCEDURE — 99214 OFFICE O/P EST MOD 30 MIN: CPT | Mod: S$GLB,,, | Performed by: OTOLARYNGOLOGY

## 2023-03-14 PROCEDURE — 3078F DIAST BP <80 MM HG: CPT | Mod: CPTII,S$GLB,, | Performed by: OTOLARYNGOLOGY

## 2023-03-14 PROCEDURE — 3074F SYST BP LT 130 MM HG: CPT | Mod: CPTII,S$GLB,, | Performed by: OTOLARYNGOLOGY

## 2023-03-14 PROCEDURE — 1101F PR PT FALLS ASSESS DOC 0-1 FALLS W/OUT INJ PAST YR: ICD-10-PCS | Mod: CPTII,S$GLB,, | Performed by: OTOLARYNGOLOGY

## 2023-03-14 PROCEDURE — 1101F PT FALLS ASSESS-DOCD LE1/YR: CPT | Mod: CPTII,S$GLB,, | Performed by: OTOLARYNGOLOGY

## 2023-03-14 PROCEDURE — 99999 PR PBB SHADOW E&M-EST. PATIENT-LVL V: CPT | Mod: PBBFAC,,, | Performed by: OTOLARYNGOLOGY

## 2023-03-14 RX ORDER — FLUTICASONE PROPIONATE 50 MCG
1 SPRAY, SUSPENSION (ML) NASAL 2 TIMES DAILY
Qty: 11.1 ML | Refills: 3 | Status: SHIPPED | OUTPATIENT
Start: 2023-03-14 | End: 2023-06-12

## 2023-03-14 RX ORDER — OFLOXACIN 3 MG/ML
3 SOLUTION AURICULAR (OTIC) 2 TIMES DAILY
Qty: 5 ML | Refills: 1 | Status: SHIPPED | OUTPATIENT
Start: 2023-03-14 | End: 2023-03-24

## 2023-03-14 NOTE — PATIENT INSTRUCTIONS
Continue daily flonase.   Patient given Rx for ofloxacin drops to use if she has otorrhea that recurs.      Patient has appt with audiology next week for hearing aid adjustment.

## 2023-03-14 NOTE — PROGRESS NOTES
Chief Complaint   Patient presents with    Follow-up       HPI:  Patient is a 82 y.o. female who has previously seen me and Dr. Miranda for ETD, chronic otitis media, mixed hearing loss.   She was seen a couple of weeks ago with new otorrhea on the right and TM perforation, as well as acute sinusitis and exacerbation of her underlying ETD.  She was placed on drops and antibiotics and flonase, and she presents today for follow up.      Since the last visit, the patient reports her symptoms have improved greatly from last visit.  She completed the entire course of ear drops and oral antibiotics, and she has continued to use her Flonase daily.    Active Ambulatory Problems     Diagnosis Date Noted    History of nonmelanoma skin cancer 06/28/2012    LBBB (left bundle branch block) 07/25/2012    Nontoxic multinodular goiter 08/24/2012    Meningioma 03/14/2013    Asthma, chronic 06/05/2013    Biventricular ICD (implantable cardioverter-defibrillator) in place 01/09/2014    Dyspnea 06/30/2014    Tremor 07/22/2014    Hypertension associated with diabetes 07/20/2015    History of breast cancer 02/02/2016    Adrenal cortical nodule: repeat CT 6/2016 03/16/2016    Calcification of aorta 03/16/2016    Diabetic polyneuropathy associated with type 2 diabetes mellitus 03/16/2016    Osteoporosis 01/26/2017    KHOA (obstructive sleep apnea) 01/26/2017    Iron deficiency anemia 05/16/2017    Chemotherapy-induced neuropathy 05/18/2017    Hypertensive retinopathy, bilateral 02/22/2018    Multiple lung nodules 04/04/2018    Chronic obstructive pulmonary disease with acute exacerbation 04/04/2018    Mixed conductive and sensorineural hearing loss 04/04/2018    Weakness     Cardiomyopathy, ischemic 01/21/2019    Metabolic bone disease 01/21/2019    Seasonal allergies 06/28/2019    Hyperlipidemia associated with type 2 diabetes mellitus 07/27/2020    Mild nonproliferative retinopathy due to secondary diabetes 07/27/2020    Macular edema due  to secondary diabetes 07/27/2020    Moderate asthma 10/01/2020    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, with long-term current use of insulin 02/22/2018    Chronic combined systolic and diastolic heart failure 03/23/2021    Obstructive sleep apnea 07/16/2021    Personal history of COVID-19 07/23/2021    Current mild episode of major depressive disorder without prior episode 01/25/2022    Microalbuminuria due to type 2 diabetes mellitus 01/26/2022    Urinary retention 02/28/2022    Hyponatremia 04/02/2022    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    Mycobacterium avium complex     Diet-controlled diabetes mellitus 04/26/2022    History of non anemic vitamin B12 deficiency 04/26/2022    Vitreous hemorrhage, left 02/22/2018    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    Acute on chronic combined systolic and diastolic CHF (congestive heart failure) 06/20/2022    COVID-19 virus infection 06/20/2022    Essential hypertension 06/20/2022    Type 2 diabetes mellitus, with long-term current use of insulin 06/20/2022    Acute hypoxemic respiratory failure 06/21/2022    UTI (urinary tract infection) 06/21/2022    ACP (advance care planning) 07/08/2022    HAP (hospital-acquired pneumonia) 07/08/2022    CKD (chronic kidney disease) stage 4, GFR 15-29 ml/min 07/08/2022    Pleural effusion     Pneumonia of left lower lobe due to Pseudomonas species 07/14/2022    Type 2 diabetes mellitus with stage 3b chronic kidney disease, with long-term current use of insulin 10/07/2022     Resolved Ambulatory Problems     Diagnosis Date Noted    AK (actinic keratosis) 06/28/2012    Nonischemic cardiomyopathy 07/25/2012    Chronic systolic heart failure 07/26/2012    Type II or unspecified type diabetes mellitus with neurological manifestations, uncontrolled 08/24/2012    Senile osteopenia 08/24/2012    Defibrillator discharge 12/20/2012     Breast cancer 01/23/2013    Ventricular tachycardia 01/29/2013    Osteoporosis, post-menopausal 04/02/2013    Senile osteopenia 04/02/2013    Coronary artery disease involving native coronary artery without angina pectoris - Non-obstructive 50% LAD 07/20/2015    DM (diabetes mellitus), type 2, uncontrolled w/neurologic complication 01/07/2016    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    Uncontrolled type 2 diabetes mellitus with hyperglycemia 11/09/2016    Type 2 diabetes mellitus with stage 3 chronic kidney disease, with long-term current use of insulin 01/26/2017    Chronic kidney disease, stage 3, mod decreased GFR 02/14/2017    Hypomagnesemia 08/31/2017    Numbness     ICD (implantable cardioverter-defibrillator) battery depletion 12/17/2018    Type 2 DM with CKD stage 3 and hypertension 01/21/2019    Proteinuria 01/21/2019    Colon cancer screening 11/05/2019    Pseudomonas pneumonia 12/07/2019    Acute renal failure superimposed on stage 4 chronic kidney disease 12/07/2019    Acute right-sided thoracic back pain 12/09/2019    CAP (community acquired pneumonia) due to Pseudomonas species 12/17/2019    Rib fracture 12/17/2019    Parapneumonic effusion 12/18/2019    Acute respiratory failure with hypoxia and hypercarbia 12/19/2019    Overweight (BMI 25.0-29.9) 07/27/2020    Pneumonia due to COVID-19 virus 12/06/2020    Hypoxia 03/23/2021    Hyperlipidemia 03/23/2021    Pneumonia of both lungs due to infectious organism 03/23/2021    Hypertensive emergency 03/23/2021    Chronic combined systolic and diastolic heart failure 04/06/2021    Chronic bronchitis, unspecified chronic bronchitis type 01/25/2022    White coat syndrome with diagnosis of hypertension 02/11/2022    Acute hyponatremia 02/24/2022    Metabolic acidosis 02/24/2022    Cough 03/03/2022    Hypothermia 04/02/2022    Acute pyelonephritis 04/02/2022    Sepsis 04/02/2022    Hyperosmolar hyperglycemic state (HHS) 06/22/2022     Past Medical  History:   Diagnosis Date    Allergy     Asthma     Basal cell carcinoma     Basal cell carcinoma     Basal cell carcinoma 05/20/2015    Basal cell carcinoma 12/22/2015    Basal cell carcinoma 12/03/2019    Basal cell carcinoma of right ala nasi 10/20/2022    Bilateral pleural effusion     Bilateral renal cysts     CAD (coronary artery disease)     Cardiomyopathy     Cataract     CHF (congestive heart failure)     Colon polyp 2011    COPD (chronic obstructive pulmonary disease)     COPD exacerbation 04/08/2018    Diabetes mellitus     Diabetes mellitus type II     Diabetes with neurologic complications     Edema     Goiter     Hematuria, unspecified     HX: breast cancer     Hypertension     Hypocalcemia     Hypokalemia     Left kidney mass     Osteoporosis, postmenopausal     Pneumonia 12/08/2019    Skin cancer     Sleep apnea     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/2015    Unspecified vitamin D deficiency     Vitamin B12 deficiency     Vitamin D deficiency disease        Review of Systems  General: negative for chills, fever or weight loss  Psychological: negative for mood changes or depression  Ophthalmic: negative for blurry vision, photophobia or eye pain  ENT: see HPI  Respiratory: no cough, shortness of breath, or wheezing  Cardiovascular: no chest pain or dyspnea on exertion  Gastrointestinal: no abdominal pain, change in bowel habits, or black/ bloody stools  Musculoskeletal: negative for gait disturbance or muscular weakness  Neurological: no syncope or seizures; no ataxia  Dermatological: negative for pruritis, rash and jaundice  Hematologic/lymphatic: no easy bruising, no new adenopathy    Physical Exam     Vitals:    03/14/23 1417   BP: (!) 115/52   Pulse: 69         Constitutional:   She is oriented to person, place, and time. Vital signs are normal. She appears well-developed and well-nourished. She appears alert. She is cooperative. Normal speech.      Head:  Normocephalic and atraumatic. Salivary glands  normal.  Facial strength is normal.      Ears:    Right Ear: Tympanic membrane is scarred and retracted. Tympanic membrane is not perforated and not erythematous. Tympanic membrane mobility is abnormal. No middle ear effusion.   Left Ear: Tympanic membrane is injected and retracted. Tympanic membrane is not erythematous. Tympanic membrane mobility is abnormal.  No middle ear effusion.   Previously observed perforation on the right side has closed.  No middle ear effusion noted.  Erythema resolved.  Both ear still with significant retraction and abnormal TMs with decreased TM movement.    Nose:  Mucosal edema, rhinorrhea and septal deviation (Slight deviation to right) present. No polyps. Turbinates abnormal and turbinate hypertrophy (3+, boggy, congested mucosa).  Right sinus exhibits no maxillary sinus tenderness and no frontal sinus tenderness. Left sinus exhibits no maxillary sinus tenderness and no frontal sinus tenderness.     Mouth/Throat  Oropharynx clear and moist without lesions or asymmetry, lips, teeth, and gums normal and oropharynx normal. No mucous membrane lesions. No oropharyngeal exudate, posterior oropharyngeal edema or posterior oropharyngeal erythema. Tonsillar erythema, tonsillar exudate.  Mirror exam not performed due to patient tolerance.  Mirror exam not performed due to patient tolerance.      Neck:  Neck normal without thyromegaly masses, asymmetry, normal tracheal structure, crepitus, and tenderness, thyroid normal, trachea normal, phonation normal, full range of motion with neck supple and no adenopathy. No JVD present. Carotid bruit is not present. No thyroid mass and no thyromegaly present.     She has no cervical adenopathy.     Cardiovascular:    Normal rate, regular rhythm and rate and rhythm, heart sounds, and pulses normal.              Pulmonary/Chest:   Effort and breath sounds normal.     Psychiatric:   She has a normal mood and affect. Her speech is normal and behavior is  normal.     Neurological:   She is alert and oriented to person, place, and time. She has neurological normal, alert and oriented. No cranial nerve deficit.     Skin:   No abrasions, lacerations, lesions, or rashes.         Assessment/Plan:      ICD-10-CM ICD-9-CM    1. Dysfunction of both eustachian tubes  H69.83 381.81 fluticasone propionate (FLONASE) 50 mcg/actuation nasal spray      2. Otorrhea of right ear  H92.11 388.60 fluticasone propionate (FLONASE) 50 mcg/actuation nasal spray      3. Chronic suppurative otitis media of right ear, unspecified otitis media location  H66.3X1 382.3 fluticasone propionate (FLONASE) 50 mcg/actuation nasal spray      4. Hearing loss, unspecified hearing loss type, unspecified laterality  H91.90 389.9       5. Chronic rhinitis  J31.0 472.0       6. Deviated nasal septum  J34.2 470           Continue Flonase nasal spray daily.  Gave patient prescription for ofloxacin eardrops that she may fill if she begins having otorrhea again.  We will plan to recheck her ears in 4-6 months or sooner if she has recurrent issues.    Patient has follow-up scheduled with her audiologist at [facility] next week for hearing aid adjustment now that her ears are back at baseline and have cleared acute infection.    January Cao MD  [facility]

## 2023-03-21 ENCOUNTER — CLINICAL SUPPORT (OUTPATIENT)
Dept: OTOLARYNGOLOGY | Facility: CLINIC | Age: 83
End: 2023-03-21
Payer: MEDICARE

## 2023-03-21 DIAGNOSIS — Z46.1 ENCOUNTER FOR FITTING AND ADJUSTMENT OF HEARING AID OF BOTH EARS: Primary | ICD-10-CM

## 2023-03-21 PROCEDURE — 99499 UNLISTED E&M SERVICE: CPT | Mod: ,,, | Performed by: AUDIOLOGIST-HEARING AID FITTER

## 2023-03-21 PROCEDURE — 99499 NO LOS: ICD-10-PCS | Mod: ,,, | Performed by: AUDIOLOGIST-HEARING AID FITTER

## 2023-03-21 NOTE — PROGRESS NOTES
Susannah Carter, CCC-A  Audiologist - [facility] [address] [place] [address]  [email or web address]  [phone]    Patient: Myesha Quinones   MRN: [id number]  [address]  Home Phone [phone]   Work Phone [phone]   Mobile [phone]   : 1940  PELAYO: 3/21/2023      HEARING AID FOLLOW-UP      Myesha Quinones is here today with her son, Macho, reporting that since her last visit, she had an ear infection and her Right ear was draining at one point.  Dr. Cao has since then resolved the infection but she needs her devices cleaned and checked.  She also states that her Left hearing aid does not help her.  She can tell that it is on and working but does not feel that it aids her hearing at all.  Her son confirms that if he is on that side of her, she cannot hear him.  She does, however, do well with the phone calls streaming to her hearing aids via Bluetooth, and her son agrees.    Listening check good AU.  Wiped hearing aids and molds (Left #[id number] UP/Right #[id number] MP).  Changed out HF3 wax filters from stock and flossed vents AU.  Opened 22 session and used instrument audiogram.  Calibrated AU and recalculated Left hearing aid only.  Changed Sound Shaper Left side only to Strong, and patient reported no feedback in the office.  No changes to the Right hearing aid, per patient request, and saved settings.  Myesha Quinones verbalized understanding and satisfaction with today's visit.  She will call if service is needed before her next appointment.    [employer]  #[id number] (R)  #[id number] (L)  Warranty expires: 10/24/2024    _______________________________  Susannah Carter, WINDY-A  Audiologist

## 2023-03-24 ENCOUNTER — LAB VISIT (OUTPATIENT)
Dept: LAB | Facility: HOSPITAL | Age: 83
End: 2023-03-24
Payer: MEDICARE

## 2023-03-24 DIAGNOSIS — B44.81 ABPA (ALLERGIC BRONCHOPULMONARY ASPERGILLOSIS): ICD-10-CM

## 2023-03-24 DIAGNOSIS — N18.4 ANEMIA IN STAGE 4 CHRONIC KIDNEY DISEASE: ICD-10-CM

## 2023-03-24 DIAGNOSIS — D63.1 ANEMIA IN STAGE 4 CHRONIC KIDNEY DISEASE: ICD-10-CM

## 2023-03-24 DIAGNOSIS — J45.50 SEVERE PERSISTENT CHRONIC ASTHMA WITHOUT COMPLICATION: ICD-10-CM

## 2023-03-24 LAB
BASOPHILS # BLD AUTO: 0.04 K/UL (ref 0–0.2)
BASOPHILS # BLD AUTO: 0.04 K/UL (ref 0–0.2)
BASOPHILS NFR BLD: 0.5 % (ref 0–1.9)
BASOPHILS NFR BLD: 0.5 % (ref 0–1.9)
DIFFERENTIAL METHOD: ABNORMAL
DIFFERENTIAL METHOD: ABNORMAL
EOSINOPHIL # BLD AUTO: 0.3 K/UL (ref 0–0.5)
EOSINOPHIL # BLD AUTO: 0.3 K/UL (ref 0–0.5)
EOSINOPHIL NFR BLD: 3.6 % (ref 0–8)
EOSINOPHIL NFR BLD: 3.6 % (ref 0–8)
ERYTHROCYTE [DISTWIDTH] IN BLOOD BY AUTOMATED COUNT: 12.5 % (ref 11.5–14.5)
ERYTHROCYTE [DISTWIDTH] IN BLOOD BY AUTOMATED COUNT: 12.5 % (ref 11.5–14.5)
HCT VFR BLD AUTO: 26.7 % (ref 37–48.5)
HCT VFR BLD AUTO: 26.7 % (ref 37–48.5)
HGB BLD-MCNC: 8.8 G/DL (ref 12–16)
HGB BLD-MCNC: 8.8 G/DL (ref 12–16)
IMM GRANULOCYTES # BLD AUTO: 0.06 K/UL (ref 0–0.04)
IMM GRANULOCYTES # BLD AUTO: 0.06 K/UL (ref 0–0.04)
IMM GRANULOCYTES NFR BLD AUTO: 0.8 % (ref 0–0.5)
IMM GRANULOCYTES NFR BLD AUTO: 0.8 % (ref 0–0.5)
LYMPHOCYTES # BLD AUTO: 1.3 K/UL (ref 1–4.8)
LYMPHOCYTES # BLD AUTO: 1.3 K/UL (ref 1–4.8)
LYMPHOCYTES NFR BLD: 15.9 % (ref 18–48)
LYMPHOCYTES NFR BLD: 15.9 % (ref 18–48)
MCH RBC QN AUTO: 31.5 PG (ref 27–31)
MCH RBC QN AUTO: 31.5 PG (ref 27–31)
MCHC RBC AUTO-ENTMCNC: 33 G/DL (ref 32–36)
MCHC RBC AUTO-ENTMCNC: 33 G/DL (ref 32–36)
MCV RBC AUTO: 96 FL (ref 82–98)
MCV RBC AUTO: 96 FL (ref 82–98)
MONOCYTES # BLD AUTO: 0.6 K/UL (ref 0.3–1)
MONOCYTES # BLD AUTO: 0.6 K/UL (ref 0.3–1)
MONOCYTES NFR BLD: 7.4 % (ref 4–15)
MONOCYTES NFR BLD: 7.4 % (ref 4–15)
NEUTROPHILS # BLD AUTO: 5.7 K/UL (ref 1.8–7.7)
NEUTROPHILS # BLD AUTO: 5.7 K/UL (ref 1.8–7.7)
NEUTROPHILS NFR BLD: 71.8 % (ref 38–73)
NEUTROPHILS NFR BLD: 71.8 % (ref 38–73)
NRBC BLD-RTO: 0 /100 WBC
NRBC BLD-RTO: 0 /100 WBC
PLATELET # BLD AUTO: 264 K/UL (ref 150–450)
PLATELET # BLD AUTO: 264 K/UL (ref 150–450)
PMV BLD AUTO: 10.1 FL (ref 9.2–12.9)
PMV BLD AUTO: 10.1 FL (ref 9.2–12.9)
RBC # BLD AUTO: 2.79 M/UL (ref 4–5.4)
RBC # BLD AUTO: 2.79 M/UL (ref 4–5.4)
WBC # BLD AUTO: 7.97 K/UL (ref 3.9–12.7)
WBC # BLD AUTO: 7.97 K/UL (ref 3.9–12.7)

## 2023-03-24 PROCEDURE — 82785 ASSAY OF IGE: CPT | Performed by: INTERNAL MEDICINE

## 2023-03-24 PROCEDURE — 86606 ASPERGILLUS ANTIBODY: CPT | Performed by: INTERNAL MEDICINE

## 2023-03-24 PROCEDURE — 80285 DRUG ASSAY VORICONAZOLE: CPT | Performed by: INTERNAL MEDICINE

## 2023-03-24 PROCEDURE — 86003 ALLG SPEC IGE CRUDE XTRC EA: CPT | Performed by: INTERNAL MEDICINE

## 2023-03-24 PROCEDURE — 85025 COMPLETE CBC W/AUTO DIFF WBC: CPT | Performed by: INTERNAL MEDICINE

## 2023-03-24 PROCEDURE — 86698 HISTOPLASMA ANTIBODY: CPT | Performed by: INTERNAL MEDICINE

## 2023-03-24 PROCEDURE — 36415 COLL VENOUS BLD VENIPUNCTURE: CPT | Performed by: INTERNAL MEDICINE

## 2023-03-24 PROCEDURE — 87305 ASPERGILLUS AG IA: CPT | Performed by: INTERNAL MEDICINE

## 2023-03-25 LAB — IGE SERPL-ACNC: 1499 IU/ML (ref 0–100)

## 2023-03-27 LAB
A FUMIGATUS IGE QN: 25.2 KU/L
DEPRECATED A FUMIGATUS IGE RAST QL: ABNORMAL
GALACTOMANNAN AG SERPL IA-ACNC: <0.5 INDEX
GALACTOMANNAN AG SERPL IA-ACNC: <0.5 INDEX
VORICONAZOLE SERPL-MCNC: <0.1 MCG/ML (ref 1–5.5)

## 2023-03-30 ENCOUNTER — OFFICE VISIT (OUTPATIENT)
Dept: OPTOMETRY | Facility: CLINIC | Age: 83
End: 2023-03-30
Payer: COMMERCIAL

## 2023-03-30 DIAGNOSIS — Z13.5 SCREENING FOR GLAUCOMA: ICD-10-CM

## 2023-03-30 DIAGNOSIS — H52.4 PRESBYOPIA: Primary | ICD-10-CM

## 2023-03-30 LAB
ASPERGILLUS AB SER QL ID: NOT DETECTED
B DERMAT AB SER QL ID: NOT DETECTED
C IMMITIS AB SER QL ID: NOT DETECTED
H CAPSUL AB SER QL ID: NOT DETECTED

## 2023-03-30 PROCEDURE — 99213 PR OFFICE/OUTPT VISIT, EST, LEVL III, 20-29 MIN: ICD-10-PCS | Mod: S$GLB,,, | Performed by: OPTOMETRIST

## 2023-03-30 PROCEDURE — 92015 PR REFRACTION: ICD-10-PCS | Mod: S$GLB,,, | Performed by: OPTOMETRIST

## 2023-03-30 PROCEDURE — 99999 PR PBB SHADOW E&M-EST. PATIENT-LVL II: CPT | Mod: PBBFAC,,, | Performed by: OPTOMETRIST

## 2023-03-30 PROCEDURE — 99999 PR PBB SHADOW E&M-EST. PATIENT-LVL II: ICD-10-PCS | Mod: PBBFAC,,, | Performed by: OPTOMETRIST

## 2023-03-30 PROCEDURE — 99213 OFFICE O/P EST LOW 20 MIN: CPT | Mod: S$GLB,,, | Performed by: OPTOMETRIST

## 2023-03-30 PROCEDURE — 92015 DETERMINE REFRACTIVE STATE: CPT | Mod: S$GLB,,, | Performed by: OPTOMETRIST

## 2023-03-30 NOTE — PROGRESS NOTES
HPI     Concerns About Ocular Health            Comments: DLS: 12/1/2022 - Dr. Arechiga          Comments    Presents today for routine eye exam. Pt reports blurry vision OS. Pt   reports no eye pain, floaters and flashes. No gtts.    S/P PCIOL OU  S/P injection OS - retina    Hemoglobin A1C       Date                     Value               Ref Range             Status                01/06/2023               5.5                 4.0 - 5.6 %           Final                   11/18/2022               5.5                 4.0 - 5.6 %           Final                 10/27/2022               5.6                 4.0 - 5.6 %           Final                        Last edited by Kathy Ramírez MA on 3/30/2023 10:05 AM.            Assessment /Plan     For exam results, see Encounter Report.    Presbyopia    Screening for glaucoma      See retina notes from 3 mos ago:  1. 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 DME OS  S/p Avastin OS x 5  S/p Ozurdex OS x 5 9/19  S/p Iluvien 9/19   Doing well, will need chronic steroid  - may need additional Iluvien OS soon  Watch OD for now   Can consider light focal/micropulse to parafoveal MA's OS if worsens   3. HTN Ret OU  BS/BP/chol control  5/22 TORSTEN OS with SRF and preretinal heme  S/p Avastin OS x 2   TORSTEN ruptured and involuted - no tx today   4. PCIOL OU  With small PCO    Pt presents TODAY with reduced VA OS since last visit w Dr Arechiga.  Discussed w pt new spex will not help VA OS (and her VA OD good with current spex)    PLAN:    Pt will keep appt as sched w Dr Arechiga in 2 weeks (April 13)

## 2023-04-04 ENCOUNTER — TELEPHONE (OUTPATIENT)
Dept: VASCULAR SURGERY | Facility: CLINIC | Age: 83
End: 2023-04-04
Payer: MEDICARE

## 2023-04-04 NOTE — TELEPHONE ENCOUNTER
----- Message from Sigrid Woodall sent at 4/4/2023  4:09 PM CDT -----  Regarding: Pt Advice  Contact: [phone]  Pts daughter is calling to see if Dr Winter does the placement for gillian-dialysis?  Please call.

## 2023-04-06 ENCOUNTER — OFFICE VISIT (OUTPATIENT)
Dept: FAMILY MEDICINE | Facility: CLINIC | Age: 83
End: 2023-04-06
Attending: FAMILY MEDICINE
Payer: MEDICARE

## 2023-04-06 ENCOUNTER — TELEPHONE (OUTPATIENT)
Dept: SURGERY | Facility: CLINIC | Age: 83
End: 2023-04-06
Payer: MEDICARE

## 2023-04-06 VITALS
HEART RATE: 68 BPM | SYSTOLIC BLOOD PRESSURE: 122 MMHG | OXYGEN SATURATION: 93 % | BODY MASS INDEX: 22.68 KG/M2 | DIASTOLIC BLOOD PRESSURE: 48 MMHG | HEIGHT: 62 IN | WEIGHT: 123.25 LBS

## 2023-04-06 DIAGNOSIS — E78.2 MIXED HYPERLIPIDEMIA: ICD-10-CM

## 2023-04-06 DIAGNOSIS — E11.22 TYPE 2 DIABETES MELLITUS WITH STAGE 3B CHRONIC KIDNEY DISEASE, WITH LONG-TERM CURRENT USE OF INSULIN: ICD-10-CM

## 2023-04-06 DIAGNOSIS — E78.5 HYPERLIPIDEMIA ASSOCIATED WITH TYPE 2 DIABETES MELLITUS: ICD-10-CM

## 2023-04-06 DIAGNOSIS — F32.0 CURRENT MILD EPISODE OF MAJOR DEPRESSIVE DISORDER WITHOUT PRIOR EPISODE: ICD-10-CM

## 2023-04-06 DIAGNOSIS — G62.0 CHEMOTHERAPY-INDUCED NEUROPATHY: ICD-10-CM

## 2023-04-06 DIAGNOSIS — N18.5 CHRONIC KIDNEY DISEASE (CKD), STAGE V: ICD-10-CM

## 2023-04-06 DIAGNOSIS — N18.4 CKD (CHRONIC KIDNEY DISEASE) STAGE 4, GFR 15-29 ML/MIN: ICD-10-CM

## 2023-04-06 DIAGNOSIS — R80.9 MICROALBUMINURIA DUE TO TYPE 2 DIABETES MELLITUS: ICD-10-CM

## 2023-04-06 DIAGNOSIS — E11.69 HYPERLIPIDEMIA ASSOCIATED WITH TYPE 2 DIABETES MELLITUS: ICD-10-CM

## 2023-04-06 DIAGNOSIS — I50.42 CHRONIC COMBINED SYSTOLIC AND DIASTOLIC HEART FAILURE: ICD-10-CM

## 2023-04-06 DIAGNOSIS — T45.1X5A CHEMOTHERAPY-INDUCED NEUROPATHY: ICD-10-CM

## 2023-04-06 DIAGNOSIS — H43.12 VITREOUS HEMORRHAGE, LEFT: ICD-10-CM

## 2023-04-06 DIAGNOSIS — N18.32 TYPE 2 DIABETES MELLITUS WITH STAGE 3B CHRONIC KIDNEY DISEASE, WITH LONG-TERM CURRENT USE OF INSULIN: ICD-10-CM

## 2023-04-06 DIAGNOSIS — E11.59 HYPERTENSION ASSOCIATED WITH DIABETES: Primary | ICD-10-CM

## 2023-04-06 DIAGNOSIS — I15.2 HYPERTENSION ASSOCIATED WITH DIABETES: Primary | ICD-10-CM

## 2023-04-06 DIAGNOSIS — J44.9 CHRONIC OBSTRUCTIVE PULMONARY DISEASE, UNSPECIFIED COPD TYPE: ICD-10-CM

## 2023-04-06 DIAGNOSIS — E11.29 MICROALBUMINURIA DUE TO TYPE 2 DIABETES MELLITUS: ICD-10-CM

## 2023-04-06 DIAGNOSIS — E11.42 DIABETIC POLYNEUROPATHY ASSOCIATED WITH TYPE 2 DIABETES MELLITUS: ICD-10-CM

## 2023-04-06 DIAGNOSIS — I70.0 CALCIFICATION OF AORTA: ICD-10-CM

## 2023-04-06 DIAGNOSIS — D32.9 MENINGIOMA: ICD-10-CM

## 2023-04-06 DIAGNOSIS — Z79.4 TYPE 2 DIABETES MELLITUS WITH STAGE 3B CHRONIC KIDNEY DISEASE, WITH LONG-TERM CURRENT USE OF INSULIN: ICD-10-CM

## 2023-04-06 PROCEDURE — 1160F RVW MEDS BY RX/DR IN RCRD: CPT | Mod: CPTII,S$GLB,, | Performed by: FAMILY MEDICINE

## 2023-04-06 PROCEDURE — 99999 PR PBB SHADOW E&M-EST. PATIENT-LVL V: ICD-10-PCS | Mod: PBBFAC,,, | Performed by: FAMILY MEDICINE

## 2023-04-06 PROCEDURE — 1126F PR PAIN SEVERITY QUANTIFIED, NO PAIN PRESENT: ICD-10-PCS | Mod: CPTII,S$GLB,, | Performed by: FAMILY MEDICINE

## 2023-04-06 PROCEDURE — 99499 RISK ADDL DX/OHS AUDIT: ICD-10-PCS | Mod: S$GLB,,, | Performed by: FAMILY MEDICINE

## 2023-04-06 PROCEDURE — 1126F AMNT PAIN NOTED NONE PRSNT: CPT | Mod: CPTII,S$GLB,, | Performed by: FAMILY MEDICINE

## 2023-04-06 PROCEDURE — 99214 OFFICE O/P EST MOD 30 MIN: CPT | Mod: S$GLB,,, | Performed by: FAMILY MEDICINE

## 2023-04-06 PROCEDURE — 99214 PR OFFICE/OUTPT VISIT, EST, LEVL IV, 30-39 MIN: ICD-10-PCS | Mod: S$GLB,,, | Performed by: FAMILY MEDICINE

## 2023-04-06 PROCEDURE — 3288F PR FALLS RISK ASSESSMENT DOCUMENTED: ICD-10-PCS | Mod: CPTII,S$GLB,, | Performed by: FAMILY MEDICINE

## 2023-04-06 PROCEDURE — 3078F PR MOST RECENT DIASTOLIC BLOOD PRESSURE < 80 MM HG: ICD-10-PCS | Mod: CPTII,S$GLB,, | Performed by: FAMILY MEDICINE

## 2023-04-06 PROCEDURE — 3074F SYST BP LT 130 MM HG: CPT | Mod: CPTII,S$GLB,, | Performed by: FAMILY MEDICINE

## 2023-04-06 PROCEDURE — 3074F PR MOST RECENT SYSTOLIC BLOOD PRESSURE < 130 MM HG: ICD-10-PCS | Mod: CPTII,S$GLB,, | Performed by: FAMILY MEDICINE

## 2023-04-06 PROCEDURE — 3288F FALL RISK ASSESSMENT DOCD: CPT | Mod: CPTII,S$GLB,, | Performed by: FAMILY MEDICINE

## 2023-04-06 PROCEDURE — 1101F PR PT FALLS ASSESS DOC 0-1 FALLS W/OUT INJ PAST YR: ICD-10-PCS | Mod: CPTII,S$GLB,, | Performed by: FAMILY MEDICINE

## 2023-04-06 PROCEDURE — 99499 UNLISTED E&M SERVICE: CPT | Mod: S$GLB,,, | Performed by: FAMILY MEDICINE

## 2023-04-06 PROCEDURE — 1159F MED LIST DOCD IN RCRD: CPT | Mod: CPTII,S$GLB,, | Performed by: FAMILY MEDICINE

## 2023-04-06 PROCEDURE — 3078F DIAST BP <80 MM HG: CPT | Mod: CPTII,S$GLB,, | Performed by: FAMILY MEDICINE

## 2023-04-06 PROCEDURE — 99999 PR PBB SHADOW E&M-EST. PATIENT-LVL V: CPT | Mod: PBBFAC,,, | Performed by: FAMILY MEDICINE

## 2023-04-06 PROCEDURE — 1101F PT FALLS ASSESS-DOCD LE1/YR: CPT | Mod: CPTII,S$GLB,, | Performed by: FAMILY MEDICINE

## 2023-04-06 PROCEDURE — 1159F PR MEDICATION LIST DOCUMENTED IN MEDICAL RECORD: ICD-10-PCS | Mod: CPTII,S$GLB,, | Performed by: FAMILY MEDICINE

## 2023-04-06 PROCEDURE — 1160F PR REVIEW ALL MEDS BY PRESCRIBER/CLIN PHARMACIST DOCUMENTED: ICD-10-PCS | Mod: CPTII,S$GLB,, | Performed by: FAMILY MEDICINE

## 2023-04-06 RX ORDER — PRAVASTATIN SODIUM 40 MG/1
40 TABLET ORAL DAILY
Qty: 90 TABLET | Refills: 3 | Status: SHIPPED | OUTPATIENT
Start: 2023-04-06 | End: 2024-03-25 | Stop reason: SDUPTHER

## 2023-04-06 NOTE — TELEPHONE ENCOUNTER
4/6/23  1426  Attempted to return patient's call. No answer. Message left via [employer].         ----- Message from Ubaldo Ryan sent at 4/6/2023  1:04 PM CDT -----  Contact: daughter  Type:  Patient Returning Call    Who Called:daughter   Does the patient know what this is regarding?:questions regarding type of procedure he specialist in  Would the patient rather a call back or a response via [facility]? Call   Best Call Back Number:   Additional Information:

## 2023-04-06 NOTE — PROGRESS NOTES
You saw patient Myesha Quinones (MRN [id number]) on 4/6/23, and she has an active problem or documented visit diagnosis of End Stage Renal Disease (ICD-10 N18.6) or Chronic Kidney Disease stage 5 (ICD-10 N18.5).  At the time of this visit, was the patient dependent on dialysis?  Please press Yes or No above.

## 2023-04-06 NOTE — Clinical Note
Generator removed from the pocket in the left upper chest. Name: Gianni Ospina      : 1948      MRN: [id number]  Encounter Provider: Robert Quiñones MD  Encounter Date: 2023   Encounter department: [address] [place] [address]     1  Hyperlipidemia associated with type 2 diabetes mellitus (HCC)  -     Coenzyme Q10 10 MG capsule; Take 1 capsule (10 mg total) by mouth daily  -     UA (URINE) with reflex to Scope; Future; Expected date: 2023  -     Comprehensive metabolic panel; Future  -     CBC and differential; Future  -     Ferritin; Future  -     Magnesium; Future  -     Lipid panel; Future  -     Vitamin B12; Future  -     Vitamin D 25 hydroxy; Future; Expected date: 2023    2  Encounter for screening mammogram for breast cancer  -     Mammo screening bilateral w 3d & cad; Future; Expected date: 2023  -     Mammo screening bilateral w 3d & cad; Future; Expected date: 2023    3  Screen for colon cancer  -     Cologuard  -     Plecanatide 3 MG TABS; Take 3 mg by mouth in the morning  -     Cologuard  -     Ambulatory referral for colonoscopy; Future    4  Screening for osteoporosis  -     DXA bone density spine hip and pelvis; Future; Expected date: 2023    5  S/P MVR (mitral valve repair)  -     aspirin (ECOTRIN LOW STRENGTH) 81 mg EC tablet; Take 1 tablet (81 mg total) by mouth daily  -     buPROPion (WELLBUTRIN) 75 mg tablet; Take 1 tablet (75 mg total) by mouth 2 (two) times a day  -     torsemide (DEMADEX) 5 MG tablet; Take 1 tablet (5 mg total) by mouth daily  -     POCT urine dip  -     metoprolol succinate (TOPROL-XL) 50 mg 24 hr tablet; Take 1 tablet (50 mg total) by mouth daily In the evening daily  -     Vitamin B12; Future  -     Vitamin D 25 hydroxy; Future; Expected date: 2023    6  Dyslipidemia  -     aspirin (ECOTRIN LOW STRENGTH) 81 mg EC tablet; Take 1 tablet (81 mg total) by mouth daily  -     atorvastatin (LIPITOR) 20 mg tablet;  Take 1 tablet (20 mg total) by mouth daily    7  Benign essential hypertension  -     losartan (COZAAR) 50 mg tablet; Take 1 tablet (50 mg total) by mouth daily In the Morning       8  Mitral valve prolapse  -     Vitamin B12; Future  -     Vitamin D 25 hydroxy; Future; Expected date: 04/06/2023    9  Hypercalcemia  -     PTH, intact; Future  -     TSH, 3rd generation; Future; Expected date: 04/06/2023  -     T4, free; Future; Expected date: 04/06/2023  -     US thyroid; Future; Expected date: 04/06/2023  -     Thyroid Antibodies Panel; Future    10  Chronic idiopathic constipation  -     Plecanatide 3 MG TABS; Take 3 mg by mouth in the morning  -     Cologuard  -     Ambulatory referral for colonoscopy; Future    11  Elevated hemoglobin A1c  -     POCT hemoglobin A1c  -     POCT ECG  -     UA (URINE) with reflex to Scope; Future; Expected date: 04/13/2023  -     Comprehensive metabolic panel; Future  -     CBC and differential; Future  -     Ferritin; Future  -     Magnesium; Future  -     Lipid panel; Future  -     Vitamin B12; Future  -     Vitamin D 25 hydroxy; Future; Expected date: 04/06/2023    12  Idiopathic osteoporosis  -     DXA bone density spine hip and pelvis; Future; Expected date: 04/06/2023    Life style Mod  FU w cardiology  RTC in 1 mo w  Blood work       Subjective      [facility] is here for First time in my Office, complete H/P Discussed w Pt in detail, she has few symptoms, med list reviewed also    Review of Systems   Constitutional: Negative for chills, fatigue and fever  HENT: Positive for postnasal drip  Negative for congestion, facial swelling, sore throat, trouble swallowing and voice change  Eyes: Negative for pain, discharge and visual disturbance  Respiratory: Negative for cough, shortness of breath and wheezing  Cardiovascular: Negative for chest pain, palpitations and leg swelling  Gastrointestinal: Positive for constipation  Negative for abdominal pain, blood in stool, diarrhea and nausea     Endocrine: Negative for polydipsia, polyphagia and polyuria  Genitourinary: Negative for difficulty urinating, hematuria and urgency  Musculoskeletal: Negative for arthralgias and myalgias  Skin: Negative for rash  Neurological: Positive for dizziness  Negative for tremors, weakness and headaches  Hematological: Negative for adenopathy  Does not bruise/bleed easily  Psychiatric/Behavioral: Negative for dysphoric mood, sleep disturbance and suicidal ideas         Current Outpatient Medications on File Prior to Visit   Medication Sig   • [DISCONTINUED] alendronate (Fosamax) 70 mg tablet Take 1 tablet (70 mg total) by mouth every 7 days   • [DISCONTINUED] aspirin (ECOTRIN LOW STRENGTH) 81 mg EC tablet Take 81 mg by mouth daily   • [DISCONTINUED] atorvastatin (LIPITOR) 20 mg tablet Take 1 tablet (20 mg total) by mouth daily   • [DISCONTINUED] buPROPion (WELLBUTRIN XL) 150 mg 24 hr tablet Take 1 tablet by mouth every morning   • [DISCONTINUED] chlorthalidone 25 mg tablet Take 25 mg by mouth daily Take 0 5mg daily   • [DISCONTINUED] cholecalciferol (VITAMIN D3) 1,000 units tablet Take 1,000 Units by mouth daily   • [DISCONTINUED] Coenzyme Q10 10 MG capsule Take 10 mg by mouth daily   • [DISCONTINUED] docusate sodium (COLACE) 100 mg capsule Take 100 mg by mouth 2 (two) times a day   • [DISCONTINUED] losartan (COZAAR) 50 mg tablet Take 1 tablet (50 mg total) by mouth daily   • [DISCONTINUED] metoprolol succinate (TOPROL-XL) 50 mg 24 hr tablet Take 50 mg by mouth daily   • [DISCONTINUED] multivitamin (THERAGRAN) TABS Take 1 tablet by mouth daily   • [DISCONTINUED] senna (SENOKOT) 8 6 MG tablet Take 1 tablet by mouth daily   • [DISCONTINUED] amoxicillin (AMOXIL) 500 mg capsule Take 500 mg by mouth every 8 (eight) hours   • [DISCONTINUED] Ascorbic Acid (VITAMIN C PO) Take 2 tablets by mouth daily   • [DISCONTINUED] Ascorbic Acid (vitamin C) 1000 MG tablet Take 1,000 mg by mouth daily   • [DISCONTINUED] calcium carbonate "(OS-GEM) 600 MG tablet Take 600 mg by mouth 2 (two) times a day with meals   • [DISCONTINUED] losartan-hydrochlorothiazide (HYZAAR) 50-12 5 mg per tablet Take 1 tablet by mouth daily   • [DISCONTINUED] Omega-3 Fatty Acids (FISH OIL PO) Take 1 capsule by mouth daily   • [DISCONTINUED] Red Yeast Rice Extract (RED YEAST RICE PO) Take 1 tablet by mouth daily   • [DISCONTINUED] TURMERIC PO Take 1 tablet by mouth daily       Objective     /82 (BP Location: Left arm, Patient Position: Sitting, Cuff Size: Standard)   Pulse 77   Temp 97 7 °F (36 5 °C) (Tympanic)   Resp 14   Ht 4' 9\" (1 448 m)   Wt 70 8 kg (156 lb)   SpO2 97%   BMI 33 76 kg/m²     Physical Exam  Constitutional:       General: She is not in acute distress  HENT:      Head: Normocephalic  Mouth/Throat:      Pharynx: No oropharyngeal exudate  Eyes:      General: No scleral icterus  Conjunctiva/sclera: Conjunctivae normal       Pupils: Pupils are equal, round, and reactive to light  Neck:      Thyroid: No thyromegaly  Cardiovascular:      Rate and Rhythm: Normal rate and regular rhythm  Heart sounds: Murmur heard  Pulmonary:      Effort: Pulmonary effort is normal  No respiratory distress  Breath sounds: Normal breath sounds  No wheezing or rales  Abdominal:      General: Bowel sounds are normal  There is no distension  Palpations: Abdomen is soft  Tenderness: There is no abdominal tenderness  There is no guarding or rebound  Musculoskeletal:         General: Tenderness present  Cervical back: Neck supple  Lymphadenopathy:      Cervical: No cervical adenopathy  Skin:     Coloration: Skin is not pale  Findings: No rash  Neurological:      Mental Status: She is alert and oriented to person, place, and time  Sensory: No sensory deficit  Motor: No weakness         Sandro Kay MD  "

## 2023-04-06 NOTE — PROGRESS NOTES
Subjective:       Patient ID: Myesha Quinones is a 82 y.o. female.    Chief Complaint: Follow-up and Medication Refill (Pravastatin and Dilitazem)    82 YR OLD PLEASANT FEMALE WITH DM II, CKD III, CHF W/GERALDINE, COPD, DEPRESSION AND OTHER CO MORBIDITIES PRESENTS TODAY FOR 3 month follow up. She is in CKD 5 and awaiting dialysis.    DM II - CONTROLLED - RECENT A1C WENT UP SINCE SHE TOOK STEROIDS IN HOSPITAL - BUT SHE FOLLOWING ENDO AND ON INSULIN and trulicity -  HGBA1C                   5.5                 01/06/2023                     CHF WITH NORMAL EF - FOLLOWS CARDIOLOGY - ON ASA    HTN - CONTROLLED    CKD III - FOLLOWS NEPHROLOGY - NO NEW ISSUES       HISTORY AS BELOW - REVIEWED IN DETAIL        Follow-up  Associated symptoms include fatigue and weakness. Pertinent negatives include no arthralgias, chest pain, congestion, diaphoresis, headaches, myalgias, nausea or sore throat.   Medication Refill  Associated symptoms include fatigue and weakness. Pertinent negatives include no arthralgias, chest pain, congestion, diaphoresis, headaches, myalgias, nausea or sore throat.   Diabetes  She presents for her follow-up diabetic visit. She has type 2 diabetes mellitus. Her disease course has been stable. There are no hypoglycemic associated symptoms. Pertinent negatives for hypoglycemia include no confusion, dizziness, headaches, nervousness/anxiousness, pallor, seizures, speech difficulty or tremors. Associated symptoms include fatigue and weakness. Pertinent negatives for diabetes include no chest pain, no polydipsia and no polyuria. There are no hypoglycemic complications. Symptoms are stable. Diabetic complications include heart disease. Risk factors for coronary artery disease include diabetes mellitus, hypertension, post-menopausal and sedentary lifestyle. Current diabetic treatment includes insulin injections and oral agent (monotherapy). She is compliant with treatment all of the time. She rarely participates  in exercise. An ACE inhibitor/angiotensin II receptor blocker is being taken. She does not see a podiatrist.Eye exam is current.   Review of Systems   Constitutional:  Positive for fatigue. Negative for activity change, diaphoresis and unexpected weight change.   HENT: Negative.  Negative for nasal congestion, ear discharge, hearing loss, rhinorrhea, sore throat and voice change.    Eyes: Negative.  Negative for pain, discharge and visual disturbance.   Respiratory: Negative.  Negative for chest tightness, shortness of breath and wheezing.    Cardiovascular: Negative.  Negative for chest pain.   Gastrointestinal: Negative.  Negative for abdominal distention, anal bleeding, constipation and nausea.   Endocrine: Negative.  Negative for cold intolerance, polydipsia and polyuria.   Genitourinary: Negative.  Negative for decreased urine volume, difficulty urinating, dysuria, frequency, menstrual problem and vaginal pain.   Musculoskeletal: Negative.  Negative for arthralgias, gait problem and myalgias.   Integumentary:  Negative for color change, pallor and wound. Negative.   Allergic/Immunologic: Negative.  Negative for environmental allergies and immunocompromised state.   Neurological:  Positive for weakness. Negative for dizziness, tremors, seizures, speech difficulty and headaches.   Hematological: Negative.  Negative for adenopathy. Does not bruise/bleed easily.   Psychiatric/Behavioral: Negative.  Negative for agitation, confusion, decreased concentration, hallucinations, self-injury and suicidal ideas. The patient is not nervous/anxious.        Past Medical History:   Diagnosis Date    Acute hypoxemic respiratory failure 12/19/2019    Acute right-sided thoracic back pain 12/09/2019    Allergy     Asthma     Basal cell carcinoma     left forehead    Basal cell carcinoma     left nose    Basal cell carcinoma 05/20/2015    right nose    Basal cell carcinoma 12/22/2015    left lower post neck    Basal cell carcinoma  12/03/2019    left ant scalp     Basal cell carcinoma of right ala nasi 10/20/2022    Bilateral pleural effusion     Bilateral renal cysts     Breast cancer     CAD (coronary artery disease)     Cardiomyopathy     Cardiomyopathy, ischemic     Cataract     CHF (congestive heart failure)     CKD (chronic kidney disease) stage 4, GFR 15-29 ml/min     Colon polyp 2011    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, with long-term current use of insulin 02/22/2018    COPD (chronic obstructive pulmonary disease)     COPD exacerbation 04/08/2018    Current mild episode of major depressive disorder without prior episode 01/25/2022    Defibrillator discharge     Diabetes mellitus     Diabetes mellitus type II     Diabetes with neurologic complications     Edema     Goiter     MNG    Hematuria, unspecified     HX: breast cancer     Hyperlipidemia     Hypertension     Hypocalcemia     Hypokalemia     Hyponatremia     Hyponatremia 04/02/2022    Iron deficiency anemia 05/16/2017    Iron deficiency anemia     Iron deficiency anemia     Left kidney mass     Meningioma     Microalbuminuria due to type 2 diabetes mellitus 01/26/2022    Osteoporosis, postmenopausal     Pneumonia 12/08/2019    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    Proteinuria 01/21/2019    Pseudomonas pneumonia     Skin cancer     s/p excision    Sleep apnea     CPAP    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/2015    mid forehead    Unspecified vitamin D deficiency     UTI (urinary tract infection) 04/02/2022    Ventricular tachycardia     Vitamin B12 deficiency     Vitamin D deficiency disease        Past Surgical History:   Procedure Laterality Date    BASAL CELL CARCINOMA EXCISION      posterior neck and nose    BREAST BIOPSY      BREAST CYST EXCISION Left     BREAST SURGERY      CARDIAC DEFIBRILLATOR PLACEMENT      x 2    CATARACT EXTRACTION W/  INTRAOCULAR LENS IMPLANT Bilateral      CHOLECYSTECTOMY      COLONOSCOPY N/A 11/5/2019    Procedure: COLONOSCOPY;  Surgeon: Boaz Botello MD;  [facility]  Service: Endoscopy;  Laterality: N/A;  AICD - [employer]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    FRACTURE SURGERY      left elbow and wrist as a child    HYSTERECTOMY      MASTECTOMY Right     REPLACEMENT OF IMPLANTABLE CARDIOVERTER-DEFIBRILLATOR (ICD) GENERATOR N/A 12/17/2018    Procedure: REPLACEMENT, ICD GENERATOR;  Surgeon: Jan Mckeon MD;  Location: [facility] EP LAB;  Service: Cardiology;  Laterality: N/A;  DONNA, CRT-D gen change, MDT, MAC, SK, 3 Prep    REVISION OF SKIN POCKET FOR CARDIOVERTER-DEFIBRILLATOR  12/17/2018    Procedure: Revision, Skin Pocket, For Cardioverter-Defibrillator;  Surgeon: Jan Mckeon MD;  Location: [facility] EP LAB;  Service: Cardiology;;    SQUAMOUS CELL CARCINOMA EXCISION      remved from forehead    TONSILLECTOMY         Family History   Problem Relation Age of Onset    Diabetes Father     Heart disease Father     Diabetes Sister     Heart disease Sister     Diabetes Brother     Heart disease Brother     Hypertension Brother     Diabetes Brother     Heart disease Brother     Hypertension Brother     Diabetes Brother     Heart disease Brother     Cancer Brother         colon    Diabetes Brother     Cancer Son         skin    Diabetes Son         prediabetes    Diabetes Daughter         prediabetes    Cancer Daughter         melanoma    Obesity Daughter     Melanoma Daughter     Diabetes Son     Asthma Mother     Hypertension Mother     Stroke Mother     No Known Problems Maternal Grandmother     No Known Problems Maternal Grandfather     No Known Problems Paternal Grandmother     No Known Problems Paternal Grandfather     Amblyopia Neg Hx     Blindness Neg Hx     Cataracts Neg Hx     Glaucoma Neg Hx     Macular degeneration Neg Hx     Retinal detachment Neg Hx     Strabismus Neg Hx     Thyroid disease Neg Hx        Social History      Socioeconomic History    Marital status:    Occupational History    Occupation: Homemaker     Employer: OTHER   Tobacco Use    Smoking status: Never     Passive exposure: Never    Smokeless tobacco: Never   Substance and Sexual Activity    Alcohol use: No     Alcohol/week: 0.0 standard drinks    Drug use: No    Sexual activity: Yes     Partners: Male   Other Topics Concern    Are you pregnant or think you may be? No    Breast-feeding No     Social Determinants of Health     Financial Resource Strain: Low Risk     Difficulty of Paying Living Expenses: Not hard at all   Food Insecurity: No Food Insecurity    Worried About Running Out of Food in the Last Year: Never true    Ran Out of Food in the Last Year: Never true   Transportation Needs: No Transportation Needs    Lack of Transportation (Medical): No    Lack of Transportation (Non-Medical): No   Housing Stability: Low Risk     Unable to Pay for Housing in the Last Year: No    Number of Places Lived in the Last Year: 1    Unstable Housing in the Last Year: No       Current Outpatient Medications   Medication Sig Dispense Refill    acetaminophen (TYLENOL) 500 MG tablet Take 500 mg by mouth as needed.      albuterol (PROVENTIL/VENTOLIN HFA) 90 mcg/actuation inhaler Inhale 2 puffs into the lungs every 6 (six) hours as needed for Wheezing. Rescue 18 g 12    albuterol-ipratropium (DUO-NEB) 2.5 mg-0.5 mg/3 mL nebulizer solution TAKE 3 MLS BY NEBULIZATION EVERY 4 (FOUR) HOURS AS NEEDED FOR WHEEZING. 270 mL 12    aspirin 81 MG Chew Take 81 mg by mouth once daily.      blood sugar diagnostic (ACCU-CHEK HECTOR) Strp Uses Accu-Check Hector meter to test BG 4x/day 400 strip 6    carvediloL (COREG) 25 MG tablet TAKE 2 TABLETS (50 MG TOTAL) BY MOUTH 2 (TWO) TIMES DAILY. 360 tablet 3    cholecalciferol, vitamin D3, (VITAMIN D3) 50 mcg (2,000 unit) Tab Take 1 tablet by mouth once daily.       cyanocobalamin (VITAMIN B-12) 100 MCG tablet Take 100 mcg by mouth once daily.    "   dulaglutide (TRULICITY) 1.5 mg/0.5 mL pen injector Inject 1.5 mg into the skin every 7 days. 4 pen 11    epoetin j[place]ica-epbx (RETACRIT) 10,000 unit/mL imjection Inject 1 mL (10,000 Units total) into the skin every 30 days. 1 mL 11    estradioL (ESTRACE) 0.01 % (0.1 mg/gram) vaginal cream Place 1 g vaginally every other day. 42.5 g 11    fluticasone propionate (FLONASE) 50 mcg/actuation nasal spray 1 spray (50 mcg total) by Each Nostril route 2 (two) times a day. 11.1 mL 3    furosemide (LASIX) 40 MG tablet Take 1 tablet (40 mg total) by mouth once daily. 90 tablet 3    hydrALAZINE (APRESOLINE) 100 MG tablet Take 1 tablet (100 mg total) by mouth 2 (two) times daily. 90 tablet 3    insulin (LANTUS SOLOSTAR U-100 INSULIN) glargine 100 units/mL SubQ pen Inject 10 Units into the skin every evening. 30 mL 3    lancets (ACCU-CHEK SOFTCLIX LANCETS) Misc Uses Accu-Chek Angelica meter to test BG 2x/day 400 each 6    magnesium oxide (MAG-OX) 400 mg tablet Take 1 tablet (400 mg total) by mouth once daily. (Patient taking differently: Take 400 mg by mouth 3 (three) times daily.)  0    montelukast (SINGULAIR) 10 mg tablet Take 1 tablet (10 mg total) by mouth once daily. 90 tablet 3    nitroGLYCERIN (NITROSTAT) 0.4 MG SL tablet Place 0.4 mg under the tongue every 5 (five) minutes as needed for Chest pain.       omega-3 fatty acids 500 mg Cap Take 1 capsule by mouth Daily.      pen needle, diabetic (BD ULTRA-FINE MINI PEN NEEDLE) 31 gauge x 3/16" Ndle Use with insulin twice a day. 400 each 3    polyethylene glycol (GLYCOLAX) 17 gram PwPk Take 17 g by mouth daily as needed (constipation). 10 each 1    pulse oximeter (PULSE OXIMETER) device by Apply Externally route 2 (two) times a day. Use twice daily at 8 AM and 3 PM and record the value in [facility] as directed. 1 each 0    sodium bicarbonate 650 MG tablet Take 2 tablets (1,300 mg total) by mouth 3 (three) times daily. (Patient taking differently: Take 1,300 mg by mouth 2 (two) times " daily.) 180 tablet 11    valsartan (DIOVAN) 80 MG tablet Take 1 tablet (80 mg total) by mouth once daily. (Patient taking differently: Take 80 mg by mouth 2 (two) times daily.) 90 tablet 3    pravastatin (PRAVACHOL) 40 MG tablet Take 1 tablet (40 mg total) by mouth once daily. 90 tablet 3     No current facility-administered medications for this visit.       Review of patient's allergies indicates:   Allergen Reactions    Iodine and iodide containing products Hives and Other (See Comments)     Not specified     Nifedipine      weakness       Objective:      Vitals:    04/06/23 0953   BP: (!) 122/48   Pulse: 68       Physical Exam  Constitutional:       General: She is not in acute distress.     Appearance: She is well-developed. She is not diaphoretic.   HENT:      Head: Normocephalic and atraumatic.      Right Ear: External ear normal.      Left Ear: External ear normal.      Nose: Nose normal.      Mouth/Throat:      Pharynx: No oropharyngeal exudate.   Eyes:      General: No scleral icterus.        Right eye: No discharge.         Left eye: No discharge.      Conjunctiva/sclera: Conjunctivae normal.      Pupils: Pupils are equal, round, and reactive to light.   Neck:      Thyroid: No thyromegaly.      Vascular: No JVD.      Trachea: No tracheal deviation.   Cardiovascular:      Rate and Rhythm: Normal rate and regular rhythm.      Heart sounds: Normal heart sounds. No murmur heard.    No friction rub. No gallop.   Pulmonary:      Effort: Pulmonary effort is normal.      Breath sounds: Normal breath sounds. No stridor. No wheezing or rales.   Chest:      Chest wall: No tenderness.   Abdominal:      General: Bowel sounds are normal. There is no distension.      Palpations: Abdomen is soft. There is no mass.      Tenderness: There is no abdominal tenderness. There is no guarding or rebound.      Hernia: No hernia is present.   Musculoskeletal:         General: No tenderness. Normal range of motion.      Cervical  back: Normal range of motion and neck supple.   Lymphadenopathy:      Cervical: No cervical adenopathy.   Skin:     General: Skin is warm and dry.      Coloration: Skin is not pale.      Findings: No erythema or rash.   Neurological:      Mental Status: She is alert and oriented to person, place, and time.      Cranial Nerves: No cranial nerve deficit.      Motor: No abnormal muscle tone.      Coordination: Coordination normal.      Deep Tendon Reflexes: Reflexes are normal and symmetric. Reflexes normal.   Psychiatric:         Behavior: Behavior normal.         Thought Content: Thought content normal.         Judgment: Judgment normal.       Assessment:       Problem List Items Addressed This Visit       Vitreous hemorrhage, left    Type 2 diabetes mellitus with stage 3b chronic kidney disease, with long-term current use of insulin    Microalbuminuria due to type 2 diabetes mellitus    Meningioma    Hypertension associated with diabetes - Primary    Hyperlipidemia associated with type 2 diabetes mellitus    Relevant Medications    pravastatin (PRAVACHOL) 40 MG tablet    Diabetic polyneuropathy associated with type 2 diabetes mellitus    Current mild episode of major depressive disorder without prior episode    CKD (chronic kidney disease) stage 4, GFR 15-29 ml/min    Chronic kidney disease (CKD), stage V    Chronic combined systolic and diastolic heart failure    Relevant Medications    pravastatin (PRAVACHOL) 40 MG tablet    Chemotherapy-induced neuropathy    Calcification of aorta     Other Visit Diagnoses       Chronic obstructive pulmonary disease, unspecified COPD type        Mixed hyperlipidemia        Relevant Medications    pravastatin (PRAVACHOL) 40 MG tablet            Plan:       Myesha was seen today for follow-up and medication refill.    Diagnoses and all orders for this visit:    Hypertension associated with diabetes    Chronic combined systolic and diastolic heart failure  -     pravastatin  (PRAVACHOL) 40 MG tablet; Take 1 tablet (40 mg total) by mouth once daily.    CKD (chronic kidney disease) stage 4, GFR 15-29 ml/min    Type 2 diabetes mellitus with stage 3b chronic kidney disease, with long-term current use of insulin    Hyperlipidemia associated with type 2 diabetes mellitus  -     pravastatin (PRAVACHOL) 40 MG tablet; Take 1 tablet (40 mg total) by mouth once daily.    Current mild episode of major depressive disorder without prior episode    Microalbuminuria due to type 2 diabetes mellitus    Chronic obstructive pulmonary disease, unspecified COPD type    Diabetic polyneuropathy associated with type 2 diabetes mellitus    Chronic kidney disease (CKD), stage V    Meningioma    Calcification of aorta    Vitreous hemorrhage, left    Chemotherapy-induced neuropathy    Mixed hyperlipidemia  -     pravastatin (PRAVACHOL) 40 MG tablet; Take 1 tablet (40 mg total) by mouth once daily.    DM II  -controlled    HTN  -controlled   -MONITOR  -dc diltiazem since on coreg already    CKD STAGE 4  -FOLLOW NEPHROLOGY  -ER AND NEPHRO PRECAUTIONS GIVEN    Depression  -controlled    COPD  -stable    Spent adequate time in obtaining history and explaining differentials    25 minutes spent during this visit of which greater than 50% devoted to face-face counseling and coordination of care regarding diagnosis and management plan      Follow up in about 3 months (around 7/6/2023), or if symptoms worsen or fail to improve.

## 2023-04-11 ENCOUNTER — TELEPHONE (OUTPATIENT)
Dept: OPHTHALMOLOGY | Facility: CLINIC | Age: 83
End: 2023-04-11
Payer: MEDICARE

## 2023-04-11 NOTE — TELEPHONE ENCOUNTER
----- Message from Livier Ty sent at 4/11/2023 11:07 AM CDT -----  Contact: Betsy @ [phone]  Pt daughter needs a call back regarding why her appt was r/s for May and not for an earlier date due to the bleed in the back of the eye

## 2023-04-12 NOTE — PLAN OF CARE
[facility] - Oncology (Hospital)  Initial Discharge Assessment       Primary Care Provider: Catrachita Rothman MD    Admission Diagnosis: Shortness of breath [R06.02]  SOB (shortness of breath) [R06.02]  CHF exacerbation [I50.9]    Admission Date: 2/21/2022  Expected Discharge Date: 2/24/2022    Discharge Barriers Identified: None    Payor: HUMANA MANAGED MEDICARE / Plan: HUMANA TOTAL CARE ADVANTAGE / Product Type: Medicare Advantage /     Extended Emergency Contact Information  Primary Emergency Contact: Betsy Bowen  Address: [address] [place] [address]           [place] [address] [facility]  Home Phone: [phone]  Mobile Phone: [phone]  Relation: Daughter  Secondary Emergency Contact: Rigo Quinones  Mobile Phone: [phone]  Relation: Son    Discharge Plan A: Home with family, Home Health  Discharge Plan B: Home with family      [id number] [place] - [address] AT [facility]  [address]  [place] [address]  Phone: [phone] Fax: [phone]    [id number] - [place] - [address] AT [facility]  [address]  [place] [address]  Phone: [phone] Fax: [phone]    [id number] [place] [facility] [id number] [address]  Phone: [phone] Fax: [phone]        Transferred from:     Past Medical History:   Diagnosis Date    Acute hypoxemic respiratory failure 12/19/2019    Acute right-sided thoracic back pain 12/9/2019    Allergy     Asthma     Basal cell carcinoma     left forehead    Basal cell carcinoma     left nose    Basal cell carcinoma 05/20/2015    right nose    Basal cell carcinoma 12/22/2015    left lower post neck    Basal cell carcinoma 12/03/2019    left ant scalp     Breast cancer     CAD (coronary artery disease)     Cardiomyopathy     Cardiomyopathy, ischemic     Cataract     CHF (congestive heart failure)     Chronic kidney  disease, stage 3, mod decreased GFR 2/14/2017    Colon polyp 2011    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, with long-term current use of insulin 2/22/2018    COPD (chronic obstructive pulmonary disease)     COPD exacerbation 4/8/2018    Current mild episode of major depressive disorder without prior episode 1/25/2022    Defibrillator discharge     Diabetes mellitus     Diabetes mellitus type II     Diabetes with neurologic complications     Goiter     MNG    HX: breast cancer     Hyperlipidemia     Hypertension     Iron deficiency anemia 5/16/2017    Left kidney mass     Meningioma     Microalbuminuria due to type 2 diabetes mellitus 1/26/2022    Osteoporosis, postmenopausal     Pneumonia 12/8/2019    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    Pseudomonas pneumonia     Skin cancer     s/p excision    Sleep apnea     CPAP    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/2015    mid forehead    Unspecified vitamin D deficiency     Ventricular tachycardia     Vitamin B12 deficiency     Vitamin D deficiency disease          CM met with patient and Rigo Quinones (son) [phone] in room for Discharge Planning Assessment.  Patient is able to answer questions.  Per patient, she lives alone in a single story house with 0 step(s) to enter.   Per patient, she was independent with ADLS and used a rollator for ambulation in the community.  Patient will have assistance from her son upon discharge.   Discharge Planning Booklet given to patient/family and discussed.  All questions addressed.  CM will follow for needs.    Therapy is recommending home health.  [facility] list provided for choices.     Initial Assessment (most recent)     Adult Discharge Assessment - 02/22/22 6231        Discharge Assessment    Assessment Type Discharge Planning Assessment     Confirmed/corrected address, phone number and insurance Yes     Confirmed  Demographics Correct on Facesheet     Source of Information patient;family     Communicated ALIX with patient/caregiver Date not available/Unable to determine     Reason For Admission Acute on chronic combined systolic and diastolic heart failure     Lives With alone     Do you expect to return to your current living situation? Yes     Do you have help at home or someone to help you manage your care at home? Yes     Who are your caregiver(s) and their phone number(s)? Rigo Quinones (son) [phone]     Prior to hospitilization cognitive status: Alert/Oriented     Current cognitive status: Alert/Oriented     Walking or Climbing Stairs Difficulty none     Dressing/Bathing Difficulty none     Home Accessibility stairs to enter home     Number of Stairs, Main Entrance none     Home Layout Able to live on 1st floor     Equipment Currently Used at Home rollator     Readmission within 30 days? No     Patient currently being followed by outpatient case management? No     Do you currently have service(s) that help you manage your care at home? No     Do you take prescription medications? Yes     Do you have prescription coverage? Yes     Coverage [employer]     Do you have any problems affording any of your prescribed medications? TBD     Is the patient taking medications as prescribed? yes     Who is going to help you get home at discharge? Rigo Quinones (son) [phone]     How do you get to doctors appointments? family or friend will provide     Are you on dialysis? No     Do you take coumadin? No     Discharge Plan A Home with family;Home Health     Discharge Plan B Home with family     DME Needed Upon Discharge  none     Discharge Plan discussed with: Patient;Adult children     Discharge Barriers Identified None               Virginia Brito RN, CCRN-K, CCM  Neuro-Critical Care   X [id number]              Render In Strict Bullet Format?: No Detail Level: Zone Initiate Treatment: lidocaine 5 % topical ointment \\nQuantity: 50.0 g  Days Supply: 30\\nSig: Apply twice daily as needed to assist with pain on the right thigh

## 2023-04-18 ENCOUNTER — OFFICE VISIT (OUTPATIENT)
Dept: SURGERY | Facility: CLINIC | Age: 83
End: 2023-04-18
Payer: MEDICARE

## 2023-04-18 VITALS
HEIGHT: 62 IN | SYSTOLIC BLOOD PRESSURE: 166 MMHG | DIASTOLIC BLOOD PRESSURE: 71 MMHG | BODY MASS INDEX: 22.74 KG/M2 | WEIGHT: 123.56 LBS | HEART RATE: 82 BPM

## 2023-04-18 DIAGNOSIS — N18.5 CKD (CHRONIC KIDNEY DISEASE), STAGE V: ICD-10-CM

## 2023-04-18 DIAGNOSIS — N18.5 CHRONIC KIDNEY DISEASE (CKD), STAGE V: Primary | ICD-10-CM

## 2023-04-18 PROCEDURE — 1159F PR MEDICATION LIST DOCUMENTED IN MEDICAL RECORD: ICD-10-PCS | Mod: CPTII,S$GLB,, | Performed by: STUDENT IN AN ORGANIZED HEALTH CARE EDUCATION/TRAINING PROGRAM

## 2023-04-18 PROCEDURE — 1126F PR PAIN SEVERITY QUANTIFIED, NO PAIN PRESENT: ICD-10-PCS | Mod: CPTII,S$GLB,, | Performed by: STUDENT IN AN ORGANIZED HEALTH CARE EDUCATION/TRAINING PROGRAM

## 2023-04-18 PROCEDURE — 3288F FALL RISK ASSESSMENT DOCD: CPT | Mod: CPTII,S$GLB,, | Performed by: STUDENT IN AN ORGANIZED HEALTH CARE EDUCATION/TRAINING PROGRAM

## 2023-04-18 PROCEDURE — 99204 OFFICE O/P NEW MOD 45 MIN: CPT | Mod: S$GLB,,, | Performed by: STUDENT IN AN ORGANIZED HEALTH CARE EDUCATION/TRAINING PROGRAM

## 2023-04-18 PROCEDURE — 3077F PR MOST RECENT SYSTOLIC BLOOD PRESSURE >= 140 MM HG: ICD-10-PCS | Mod: CPTII,S$GLB,, | Performed by: STUDENT IN AN ORGANIZED HEALTH CARE EDUCATION/TRAINING PROGRAM

## 2023-04-18 PROCEDURE — 1126F AMNT PAIN NOTED NONE PRSNT: CPT | Mod: CPTII,S$GLB,, | Performed by: STUDENT IN AN ORGANIZED HEALTH CARE EDUCATION/TRAINING PROGRAM

## 2023-04-18 PROCEDURE — 3078F DIAST BP <80 MM HG: CPT | Mod: CPTII,S$GLB,, | Performed by: STUDENT IN AN ORGANIZED HEALTH CARE EDUCATION/TRAINING PROGRAM

## 2023-04-18 PROCEDURE — 3288F PR FALLS RISK ASSESSMENT DOCUMENTED: ICD-10-PCS | Mod: CPTII,S$GLB,, | Performed by: STUDENT IN AN ORGANIZED HEALTH CARE EDUCATION/TRAINING PROGRAM

## 2023-04-18 PROCEDURE — 99204 PR OFFICE/OUTPT VISIT, NEW, LEVL IV, 45-59 MIN: ICD-10-PCS | Mod: S$GLB,,, | Performed by: STUDENT IN AN ORGANIZED HEALTH CARE EDUCATION/TRAINING PROGRAM

## 2023-04-18 PROCEDURE — 99999 PR PBB SHADOW E&M-EST. PATIENT-LVL V: CPT | Mod: PBBFAC,,, | Performed by: STUDENT IN AN ORGANIZED HEALTH CARE EDUCATION/TRAINING PROGRAM

## 2023-04-18 PROCEDURE — 1101F PT FALLS ASSESS-DOCD LE1/YR: CPT | Mod: CPTII,S$GLB,, | Performed by: STUDENT IN AN ORGANIZED HEALTH CARE EDUCATION/TRAINING PROGRAM

## 2023-04-18 PROCEDURE — 3078F PR MOST RECENT DIASTOLIC BLOOD PRESSURE < 80 MM HG: ICD-10-PCS | Mod: CPTII,S$GLB,, | Performed by: STUDENT IN AN ORGANIZED HEALTH CARE EDUCATION/TRAINING PROGRAM

## 2023-04-18 PROCEDURE — 1101F PR PT FALLS ASSESS DOC 0-1 FALLS W/OUT INJ PAST YR: ICD-10-PCS | Mod: CPTII,S$GLB,, | Performed by: STUDENT IN AN ORGANIZED HEALTH CARE EDUCATION/TRAINING PROGRAM

## 2023-04-18 PROCEDURE — 1159F MED LIST DOCD IN RCRD: CPT | Mod: CPTII,S$GLB,, | Performed by: STUDENT IN AN ORGANIZED HEALTH CARE EDUCATION/TRAINING PROGRAM

## 2023-04-18 PROCEDURE — 3077F SYST BP >= 140 MM HG: CPT | Mod: CPTII,S$GLB,, | Performed by: STUDENT IN AN ORGANIZED HEALTH CARE EDUCATION/TRAINING PROGRAM

## 2023-04-18 PROCEDURE — 99999 PR PBB SHADOW E&M-EST. PATIENT-LVL V: ICD-10-PCS | Mod: PBBFAC,,, | Performed by: STUDENT IN AN ORGANIZED HEALTH CARE EDUCATION/TRAINING PROGRAM

## 2023-04-20 NOTE — H&P (VIEW-ONLY)
Patient ID: Myesha Quinones is a 82 y.o. female.    Chief Complaint: No chief complaint on file.      HPI:  HPI  82F referred to me by dr flores for CKD5. Never had PD or HD. Interested in PD.   Hx of COPD, well controlled. Has inhaler for prn but hasnt used it in months.   Hx of BBB, has ICD. Echo last year showed EF 55%  Overall feels well.  Hx of lap gracie, hysterectomy    Review of Systems   Constitutional:  Negative for fever.   HENT:  Negative for trouble swallowing.    Respiratory:  Negative for shortness of breath.    Cardiovascular:  Negative for chest pain.   Gastrointestinal:  Negative for abdominal pain, blood in stool, nausea and vomiting.   Genitourinary:  Negative for dysuria.   Musculoskeletal:  Negative for gait problem.   Skin:  Negative for rash and wound.   Allergic/Immunologic: Negative for immunocompromised state.   Neurological:  Negative for weakness.   Hematological:  Does not bruise/bleed easily.   Psychiatric/Behavioral:  Negative for agitation.      Current Outpatient Medications   Medication Sig Dispense Refill    acetaminophen (TYLENOL) 500 MG tablet Take 500 mg by mouth as needed.      albuterol (PROVENTIL/VENTOLIN HFA) 90 mcg/actuation inhaler Inhale 2 puffs into the lungs every 6 (six) hours as needed for Wheezing. Rescue 18 g 12    albuterol-ipratropium (DUO-NEB) 2.5 mg-0.5 mg/3 mL nebulizer solution TAKE 3 MLS BY NEBULIZATION EVERY 4 (FOUR) HOURS AS NEEDED FOR WHEEZING. 270 mL 12    aspirin 81 MG Chew Take 81 mg by mouth once daily.      blood sugar diagnostic (ACCU-CHEK HECTOR) Strp Uses Accu-Check Hector meter to test BG 4x/day 400 strip 6    carvediloL (COREG) 25 MG tablet TAKE 2 TABLETS (50 MG TOTAL) BY MOUTH 2 (TWO) TIMES DAILY. 360 tablet 3    cholecalciferol, vitamin D3, (VITAMIN D3) 50 mcg (2,000 unit) Tab Take 1 tablet by mouth once daily.       cyanocobalamin (VITAMIN B-12) 100 MCG tablet Take 100 mcg by mouth once daily.      dulaglutide (TRULICITY) 1.5 mg/0.5 mL  "pen injector Inject 1.5 mg into the skin every 7 days. 4 pen 11    epoetin [place]-epbx (RETACRIT) 10,000 unit/mL imjection Inject 1 mL (10,000 Units total) into the skin every 30 days. 1 mL 11    estradioL (ESTRACE) 0.01 % (0.1 mg/gram) vaginal cream Place 1 g vaginally every other day. 42.5 g 11    fluticasone propionate (FLONASE) 50 mcg/actuation nasal spray 1 spray (50 mcg total) by Each Nostril route 2 (two) times a day. 11.1 mL 3    furosemide (LASIX) 40 MG tablet Take 1 tablet (40 mg total) by mouth once daily. 90 tablet 3    hydrALAZINE (APRESOLINE) 100 MG tablet Take 1 tablet (100 mg total) by mouth 2 (two) times daily. 90 tablet 3    insulin (LANTUS SOLOSTAR U-100 INSULIN) glargine 100 units/mL SubQ pen Inject 10 Units into the skin every evening. 30 mL 3    lancets (ACCU-CHEK SOFTCLIX LANCETS) Misc Uses Accu-Chek Angelica meter to test BG 2x/day 400 each 6    magnesium oxide (MAG-OX) 400 mg tablet Take 1 tablet (400 mg total) by mouth once daily. (Patient taking differently: Take 400 mg by mouth 3 (three) times daily.)  0    montelukast (SINGULAIR) 10 mg tablet Take 1 tablet (10 mg total) by mouth once daily. 90 tablet 3    nitroGLYCERIN (NITROSTAT) 0.4 MG SL tablet Place 0.4 mg under the tongue every 5 (five) minutes as needed for Chest pain.       omega-3 fatty acids 500 mg Cap Take 1 capsule by mouth Daily.      pen needle, diabetic (BD ULTRA-FINE MINI PEN NEEDLE) 31 gauge x 3/16" Ndle Use with insulin twice a day. 400 each 3    polyethylene glycol (GLYCOLAX) 17 gram PwPk Take 17 g by mouth daily as needed (constipation). 10 each 1    pravastatin (PRAVACHOL) 40 MG tablet Take 1 tablet (40 mg total) by mouth once daily. 90 tablet 3    pulse oximeter (PULSE OXIMETER) device by Apply Externally route 2 (two) times a day. Use twice daily at 8 AM and 3 PM and record the value in MyChart as directed. 1 each 0    sodium bicarbonate 650 MG tablet Take 2 tablets (1,300 mg total) by mouth 3 (three) times daily. " (Patient taking differently: Take 1,300 mg by mouth 2 (two) times daily.) 180 tablet 11    valsartan (DIOVAN) 80 MG tablet Take 1 tablet (80 mg total) by mouth once daily. (Patient taking differently: Take 80 mg by mouth 2 (two) times daily.) 90 tablet 3     No current facility-administered medications for this visit.       Review of patient's allergies indicates:   Allergen Reactions    Iodine and iodide containing products Hives and Other (See Comments)     Not specified     Nifedipine      weakness       Past Medical History:   Diagnosis Date    Acute hypoxemic respiratory failure 12/19/2019    Acute right-sided thoracic back pain 12/09/2019    Allergy     Asthma     Basal cell carcinoma     left forehead    Basal cell carcinoma     left nose    Basal cell carcinoma 05/20/2015    right nose    Basal cell carcinoma 12/22/2015    left lower post neck    Basal cell carcinoma 12/03/2019    left ant scalp     Basal cell carcinoma of right ala nasi 10/20/2022    Bilateral pleural effusion     Bilateral renal cysts     Breast cancer     CAD (coronary artery disease)     Cardiomyopathy     Cardiomyopathy, ischemic     Cataract     CHF (congestive heart failure)     CKD (chronic kidney disease) stage 4, GFR 15-29 ml/min     Colon polyp 2011    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, with long-term current use of insulin 02/22/2018    COPD (chronic obstructive pulmonary disease)     COPD exacerbation 04/08/2018    Current mild episode of major depressive disorder without prior episode 01/25/2022    Defibrillator discharge     Diabetes mellitus     Diabetes mellitus type II     Diabetes with neurologic complications     Edema     Goiter     MNG    Hematuria, unspecified     HX: breast cancer     Hyperlipidemia     Hypertension     Hypocalcemia     Hypokalemia     Hyponatremia     Hyponatremia 04/02/2022    Iron deficiency anemia 05/16/2017    Iron  deficiency anemia     Iron deficiency anemia     Left kidney mass     Meningioma     Microalbuminuria due to type 2 diabetes mellitus 01/26/2022    Osteoporosis, postmenopausal     Pneumonia 12/08/2019    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    Proteinuria 01/21/2019    Pseudomonas pneumonia     Skin cancer     s/p excision    Sleep apnea     CPAP    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/2015    mid forehead    Unspecified vitamin D deficiency     UTI (urinary tract infection) 04/02/2022    Ventricular tachycardia     Vitamin B12 deficiency     Vitamin D deficiency disease        Past Surgical History:   Procedure Laterality Date    BASAL CELL CARCINOMA EXCISION      posterior neck and nose    BREAST BIOPSY      BREAST CYST EXCISION Left     BREAST SURGERY      CARDIAC DEFIBRILLATOR PLACEMENT      x 2    CATARACT EXTRACTION W/  INTRAOCULAR LENS IMPLANT Bilateral     CHOLECYSTECTOMY      COLONOSCOPY N/A 11/5/2019    Procedure: COLONOSCOPY;  Surgeon: Boaz Botello MD;  Location: [facility] ENDO ([address] [place] [address]);  Service: Endoscopy;  Laterality: N/A;  AICD - Medtronic -     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    FRACTURE SURGERY      left elbow and wrist as a child    HYSTERECTOMY      MASTECTOMY Right     REPLACEMENT OF IMPLANTABLE CARDIOVERTER-DEFIBRILLATOR (ICD) GENERATOR N/A 12/17/2018    Procedure: REPLACEMENT, ICD GENERATOR;  Surgeon: Jan Mckeon MD;  Location: [facility] EP LAB;  Service: Cardiology;  Laterality: N/A;  DONNA, CRT-D gen change, MDT, MAC, SK, 3 Prep    REVISION OF SKIN POCKET FOR CARDIOVERTER-DEFIBRILLATOR  12/17/2018    Procedure: Revision, Skin Pocket, For Cardioverter-Defibrillator;  Surgeon: Jan Mckeon MD;  Location: [facility] EP LAB;  Service: Cardiology;;    SQUAMOUS CELL CARCINOMA EXCISION      remved from forehead    TONSILLECTOMY         Family History   Problem Relation Age of Onset    Diabetes Father     Heart disease Father     Diabetes Sister     Heart disease Sister     Diabetes Brother      Heart disease Brother     Hypertension Brother     Diabetes Brother     Heart disease Brother     Hypertension Brother     Diabetes Brother     Heart disease Brother     Cancer Brother         colon    Diabetes Brother     Cancer Son         skin    Diabetes Son         prediabetes    Diabetes Daughter         prediabetes    Cancer Daughter         melanoma    Obesity Daughter     Melanoma Daughter     Diabetes Son     Asthma Mother     Hypertension Mother     Stroke Mother     No Known Problems Maternal Grandmother     No Known Problems Maternal Grandfather     No Known Problems Paternal Grandmother     No Known Problems Paternal Grandfather     Amblyopia Neg Hx     Blindness Neg Hx     Cataracts Neg Hx     Glaucoma Neg Hx     Macular degeneration Neg Hx     Retinal detachment Neg Hx     Strabismus Neg Hx     Thyroid disease Neg Hx        Social History     Socioeconomic History    Marital status:    Occupational History    Occupation: Homemaker     Employer: OTHER   Tobacco Use    Smoking status: Never     Passive exposure: Never    Smokeless tobacco: Never   Substance and Sexual Activity    Alcohol use: No     Alcohol/week: 0.0 standard drinks    Drug use: No    Sexual activity: Yes     Partners: Male   Other Topics Concern    Are you pregnant or think you may be? No    Breast-feeding No     Social Determinants of Health     Financial Resource Strain: Low Risk     Difficulty of Paying Living Expenses: Not hard at all   Food Insecurity: No Food Insecurity    Worried About Running Out of Food in the Last Year: Never true    Ran Out of Food in the Last Year: Never true   Transportation Needs: No Transportation Needs    Lack of Transportation (Medical): No    Lack of Transportation (Non-Medical): No   Housing Stability: Low Risk     Unable to Pay for Housing in the Last Year: No    Number of Places Lived in the Last Year: 1    Unstable Housing in the Last Year: No       Vitals:    04/18/23 1428   BP: (!)  166/71   Pulse: 82       Physical Exam  Constitutional:       General: She is not in acute distress.     Appearance: She is well-developed.   HENT:      Head: Normocephalic and atraumatic.   Eyes:      General: No scleral icterus.  Cardiovascular:      Rate and Rhythm: Normal rate.   Pulmonary:      Effort: Pulmonary effort is normal.      Breath sounds: No stridor.   Abdominal:      General: There is no distension.      Palpations: Abdomen is soft.      Tenderness: There is no abdominal tenderness.   Lymphadenopathy:      Cervical: No cervical adenopathy.   Skin:     General: Skin is warm.      Findings: No erythema.   Neurological:      Mental Status: She is alert and oriented to person, place, and time.   Psychiatric:         Behavior: Behavior normal.   Body mass index is 22.6 kg/m².  GFR 15 in January, downtrending  Hga1c 5.5      Assessment & Plan:  82F with CKD5 in need of dialysis access  Discussed PD and catheter placement  Wants to proceed   To OR for pd catheter apr 25

## 2023-04-20 NOTE — PROGRESS NOTES
Patient ID: Myesha Quinones is a 82 y.o. female.    Chief Complaint: No chief complaint on file.      HPI:  HPI  82F referred to me by dr flores for CKD5. Never had PD or HD. Interested in PD.   Hx of COPD, well controlled. Has inhaler for prn but hasnt used it in months.   Hx of BBB, has ICD. Echo last year showed EF 55%  Overall feels well.  Hx of lap gracie, hysterectomy    Review of Systems   Constitutional:  Negative for fever.   HENT:  Negative for trouble swallowing.    Respiratory:  Negative for shortness of breath.    Cardiovascular:  Negative for chest pain.   Gastrointestinal:  Negative for abdominal pain, blood in stool, nausea and vomiting.   Genitourinary:  Negative for dysuria.   Musculoskeletal:  Negative for gait problem.   Skin:  Negative for rash and wound.   Allergic/Immunologic: Negative for immunocompromised state.   Neurological:  Negative for weakness.   Hematological:  Does not bruise/bleed easily.   Psychiatric/Behavioral:  Negative for agitation.      Current Outpatient Medications   Medication Sig Dispense Refill    acetaminophen (TYLENOL) 500 MG tablet Take 500 mg by mouth as needed.      albuterol (PROVENTIL/VENTOLIN HFA) 90 mcg/actuation inhaler Inhale 2 puffs into the lungs every 6 (six) hours as needed for Wheezing. Rescue 18 g 12    albuterol-ipratropium (DUO-NEB) 2.5 mg-0.5 mg/3 mL nebulizer solution TAKE 3 MLS BY NEBULIZATION EVERY 4 (FOUR) HOURS AS NEEDED FOR WHEEZING. 270 mL 12    aspirin 81 MG Chew Take 81 mg by mouth once daily.      blood sugar diagnostic (ACCU-CHEK HECTOR) Strp Uses Accu-Check Hector meter to test BG 4x/day 400 strip 6    carvediloL (COREG) 25 MG tablet TAKE 2 TABLETS (50 MG TOTAL) BY MOUTH 2 (TWO) TIMES DAILY. 360 tablet 3    cholecalciferol, vitamin D3, (VITAMIN D3) 50 mcg (2,000 unit) Tab Take 1 tablet by mouth once daily.       cyanocobalamin (VITAMIN B-12) 100 MCG tablet Take 100 mcg by mouth once daily.      dulaglutide (TRULICITY) 1.5 mg/0.5 mL  "pen injector Inject 1.5 mg into the skin every 7 days. 4 pen 11    epoetin [place]-epbx (RETACRIT) 10,000 unit/mL imjection Inject 1 mL (10,000 Units total) into the skin every 30 days. 1 mL 11    estradioL (ESTRACE) 0.01 % (0.1 mg/gram) vaginal cream Place 1 g vaginally every other day. 42.5 g 11    fluticasone propionate (FLONASE) 50 mcg/actuation nasal spray 1 spray (50 mcg total) by Each Nostril route 2 (two) times a day. 11.1 mL 3    furosemide (LASIX) 40 MG tablet Take 1 tablet (40 mg total) by mouth once daily. 90 tablet 3    hydrALAZINE (APRESOLINE) 100 MG tablet Take 1 tablet (100 mg total) by mouth 2 (two) times daily. 90 tablet 3    insulin (LANTUS SOLOSTAR U-100 INSULIN) glargine 100 units/mL SubQ pen Inject 10 Units into the skin every evening. 30 mL 3    lancets (ACCU-CHEK SOFTCLIX LANCETS) Misc Uses Accu-Chek Angelica meter to test BG 2x/day 400 each 6    magnesium oxide (MAG-OX) 400 mg tablet Take 1 tablet (400 mg total) by mouth once daily. (Patient taking differently: Take 400 mg by mouth 3 (three) times daily.)  0    montelukast (SINGULAIR) 10 mg tablet Take 1 tablet (10 mg total) by mouth once daily. 90 tablet 3    nitroGLYCERIN (NITROSTAT) 0.4 MG SL tablet Place 0.4 mg under the tongue every 5 (five) minutes as needed for Chest pain.       omega-3 fatty acids 500 mg Cap Take 1 capsule by mouth Daily.      pen needle, diabetic (BD ULTRA-FINE MINI PEN NEEDLE) 31 gauge x 3/16" Ndle Use with insulin twice a day. 400 each 3    polyethylene glycol (GLYCOLAX) 17 gram PwPk Take 17 g by mouth daily as needed (constipation). 10 each 1    pravastatin (PRAVACHOL) 40 MG tablet Take 1 tablet (40 mg total) by mouth once daily. 90 tablet 3    pulse oximeter (PULSE OXIMETER) device by Apply Externally route 2 (two) times a day. Use twice daily at 8 AM and 3 PM and record the value in MyChart as directed. 1 each 0    sodium bicarbonate 650 MG tablet Take 2 tablets (1,300 mg total) by mouth 3 (three) times daily. " (Patient taking differently: Take 1,300 mg by mouth 2 (two) times daily.) 180 tablet 11    valsartan (DIOVAN) 80 MG tablet Take 1 tablet (80 mg total) by mouth once daily. (Patient taking differently: Take 80 mg by mouth 2 (two) times daily.) 90 tablet 3     No current facility-administered medications for this visit.       Review of patient's allergies indicates:   Allergen Reactions    Iodine and iodide containing products Hives and Other (See Comments)     Not specified     Nifedipine      weakness       Past Medical History:   Diagnosis Date    Acute hypoxemic respiratory failure 12/19/2019    Acute right-sided thoracic back pain 12/09/2019    Allergy     Asthma     Basal cell carcinoma     left forehead    Basal cell carcinoma     left nose    Basal cell carcinoma 05/20/2015    right nose    Basal cell carcinoma 12/22/2015    left lower post neck    Basal cell carcinoma 12/03/2019    left ant scalp     Basal cell carcinoma of right ala nasi 10/20/2022    Bilateral pleural effusion     Bilateral renal cysts     Breast cancer     CAD (coronary artery disease)     Cardiomyopathy     Cardiomyopathy, ischemic     Cataract     CHF (congestive heart failure)     CKD (chronic kidney disease) stage 4, GFR 15-29 ml/min     Colon polyp 2011    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, with long-term current use of insulin 02/22/2018    COPD (chronic obstructive pulmonary disease)     COPD exacerbation 04/08/2018    Current mild episode of major depressive disorder without prior episode 01/25/2022    Defibrillator discharge     Diabetes mellitus     Diabetes mellitus type II     Diabetes with neurologic complications     Edema     Goiter     MNG    Hematuria, unspecified     HX: breast cancer     Hyperlipidemia     Hypertension     Hypocalcemia     Hypokalemia     Hyponatremia     Hyponatremia 04/02/2022    Iron deficiency anemia 05/16/2017    Iron  deficiency anemia     Iron deficiency anemia     Left kidney mass     Meningioma     Microalbuminuria due to type 2 diabetes mellitus 01/26/2022    Osteoporosis, postmenopausal     Pneumonia 12/08/2019    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    Proteinuria 01/21/2019    Pseudomonas pneumonia     Skin cancer     s/p excision    Sleep apnea     CPAP    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/2015    mid forehead    Unspecified vitamin D deficiency     UTI (urinary tract infection) 04/02/2022    Ventricular tachycardia     Vitamin B12 deficiency     Vitamin D deficiency disease        Past Surgical History:   Procedure Laterality Date    BASAL CELL CARCINOMA EXCISION      posterior neck and nose    BREAST BIOPSY      BREAST CYST EXCISION Left     BREAST SURGERY      CARDIAC DEFIBRILLATOR PLACEMENT      x 2    CATARACT EXTRACTION W/  INTRAOCULAR LENS IMPLANT Bilateral     CHOLECYSTECTOMY      COLONOSCOPY N/A 11/5/2019    Procedure: COLONOSCOPY;  Surgeon: Boaz Botello MD;  Location: [facility] ENDO ([address] [place] [address]);  Service: Endoscopy;  Laterality: N/A;  AICD - Medtronic -     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    FRACTURE SURGERY      left elbow and wrist as a child    HYSTERECTOMY      MASTECTOMY Right     REPLACEMENT OF IMPLANTABLE CARDIOVERTER-DEFIBRILLATOR (ICD) GENERATOR N/A 12/17/2018    Procedure: REPLACEMENT, ICD GENERATOR;  Surgeon: Jan Mckeon MD;  Location: [facility] EP LAB;  Service: Cardiology;  Laterality: N/A;  DONNA, CRT-D gen change, MDT, MAC, SK, 3 Prep    REVISION OF SKIN POCKET FOR CARDIOVERTER-DEFIBRILLATOR  12/17/2018    Procedure: Revision, Skin Pocket, For Cardioverter-Defibrillator;  Surgeon: Jna Mckeon MD;  Location: [facility] EP LAB;  Service: Cardiology;;    SQUAMOUS CELL CARCINOMA EXCISION      remved from forehead    TONSILLECTOMY         Family History   Problem Relation Age of Onset    Diabetes Father     Heart disease Father     Diabetes Sister     Heart disease Sister     Diabetes Brother      Heart disease Brother     Hypertension Brother     Diabetes Brother     Heart disease Brother     Hypertension Brother     Diabetes Brother     Heart disease Brother     Cancer Brother         colon    Diabetes Brother     Cancer Son         skin    Diabetes Son         prediabetes    Diabetes Daughter         prediabetes    Cancer Daughter         melanoma    Obesity Daughter     Melanoma Daughter     Diabetes Son     Asthma Mother     Hypertension Mother     Stroke Mother     No Known Problems Maternal Grandmother     No Known Problems Maternal Grandfather     No Known Problems Paternal Grandmother     No Known Problems Paternal Grandfather     Amblyopia Neg Hx     Blindness Neg Hx     Cataracts Neg Hx     Glaucoma Neg Hx     Macular degeneration Neg Hx     Retinal detachment Neg Hx     Strabismus Neg Hx     Thyroid disease Neg Hx        Social History     Socioeconomic History    Marital status:    Occupational History    Occupation: Homemaker     Employer: OTHER   Tobacco Use    Smoking status: Never     Passive exposure: Never    Smokeless tobacco: Never   Substance and Sexual Activity    Alcohol use: No     Alcohol/week: 0.0 standard drinks    Drug use: No    Sexual activity: Yes     Partners: Male   Other Topics Concern    Are you pregnant or think you may be? No    Breast-feeding No     Social Determinants of Health     Financial Resource Strain: Low Risk     Difficulty of Paying Living Expenses: Not hard at all   Food Insecurity: No Food Insecurity    Worried About Running Out of Food in the Last Year: Never true    Ran Out of Food in the Last Year: Never true   Transportation Needs: No Transportation Needs    Lack of Transportation (Medical): No    Lack of Transportation (Non-Medical): No   Housing Stability: Low Risk     Unable to Pay for Housing in the Last Year: No    Number of Places Lived in the Last Year: 1    Unstable Housing in the Last Year: No       Vitals:    04/18/23 1428   BP: (!)  166/71   Pulse: 82       Physical Exam  Constitutional:       General: She is not in acute distress.     Appearance: She is well-developed.   HENT:      Head: Normocephalic and atraumatic.   Eyes:      General: No scleral icterus.  Cardiovascular:      Rate and Rhythm: Normal rate.   Pulmonary:      Effort: Pulmonary effort is normal.      Breath sounds: No stridor.   Abdominal:      General: There is no distension.      Palpations: Abdomen is soft.      Tenderness: There is no abdominal tenderness.   Lymphadenopathy:      Cervical: No cervical adenopathy.   Skin:     General: Skin is warm.      Findings: No erythema.   Neurological:      Mental Status: She is alert and oriented to person, place, and time.   Psychiatric:         Behavior: Behavior normal.   Body mass index is 22.6 kg/m².  GFR 15 in January, downtrending  Hga1c 5.5      Assessment & Plan:  82F with CKD5 in need of dialysis access  Discussed PD and catheter placement  Wants to proceed   To OR for pd catheter apr 25

## 2023-04-25 ENCOUNTER — ANESTHESIA EVENT (OUTPATIENT)
Dept: SURGERY | Facility: HOSPITAL | Age: 83
End: 2023-04-25
Payer: MEDICARE

## 2023-04-25 ENCOUNTER — HOSPITAL ENCOUNTER (OUTPATIENT)
Facility: HOSPITAL | Age: 83
Discharge: HOME | End: 2023-04-25
Attending: STUDENT IN AN ORGANIZED HEALTH CARE EDUCATION/TRAINING PROGRAM | Admitting: STUDENT IN AN ORGANIZED HEALTH CARE EDUCATION/TRAINING PROGRAM
Payer: MEDICARE

## 2023-04-25 ENCOUNTER — ANESTHESIA (OUTPATIENT)
Dept: SURGERY | Facility: HOSPITAL | Age: 83
End: 2023-04-25
Payer: MEDICARE

## 2023-04-25 VITALS
WEIGHT: 120 LBS | RESPIRATION RATE: 18 BRPM | HEIGHT: 63 IN | DIASTOLIC BLOOD PRESSURE: 70 MMHG | TEMPERATURE: 98 F | OXYGEN SATURATION: 95 % | SYSTOLIC BLOOD PRESSURE: 154 MMHG | HEART RATE: 90 BPM | BODY MASS INDEX: 21.26 KG/M2

## 2023-04-25 DIAGNOSIS — N18.5 CHRONIC KIDNEY DISEASE (CKD), STAGE V: ICD-10-CM

## 2023-04-25 DIAGNOSIS — N18.5 CKD (CHRONIC KIDNEY DISEASE), STAGE V: Primary | ICD-10-CM

## 2023-04-25 DIAGNOSIS — Z01.818 PREOP TESTING: ICD-10-CM

## 2023-04-25 LAB
ANION GAP SERPL CALC-SCNC: 13 MMOL/L (ref 8–16)
BUN SERPL-MCNC: 74 MG/DL (ref 8–23)
CALCIUM SERPL-MCNC: 8.7 MG/DL (ref 8.7–10.5)
CHLORIDE SERPL-SCNC: 99 MMOL/L (ref 95–110)
CO2 SERPL-SCNC: 24 MMOL/L (ref 23–29)
CREAT SERPL-MCNC: 2.9 MG/DL (ref 0.5–1.4)
EST. GFR  (NO RACE VARIABLE): 15 ML/MIN/1.73 M^2
GLUCOSE SERPL-MCNC: 149 MG/DL (ref 70–110)
POTASSIUM SERPL-SCNC: 4.8 MMOL/L (ref 3.5–5.1)
SODIUM SERPL-SCNC: 136 MMOL/L (ref 136–145)

## 2023-04-25 PROCEDURE — 49326 LAP W/OMENTOPEXY ADD-ON: CPT | Mod: ,,, | Performed by: STUDENT IN AN ORGANIZED HEALTH CARE EDUCATION/TRAINING PROGRAM

## 2023-04-25 PROCEDURE — 36000709 HC OR TIME LEV III EA ADD 15 MIN: Performed by: STUDENT IN AN ORGANIZED HEALTH CARE EDUCATION/TRAINING PROGRAM

## 2023-04-25 PROCEDURE — 49324 LAP INSERT TUNNEL IP CATH: CPT | Mod: ,,, | Performed by: STUDENT IN AN ORGANIZED HEALTH CARE EDUCATION/TRAINING PROGRAM

## 2023-04-25 PROCEDURE — 25000003 PHARM REV CODE 250: Performed by: NURSE ANESTHETIST, CERTIFIED REGISTERED

## 2023-04-25 PROCEDURE — 25000003 PHARM REV CODE 250: Performed by: STUDENT IN AN ORGANIZED HEALTH CARE EDUCATION/TRAINING PROGRAM

## 2023-04-25 PROCEDURE — 37000009 HC ANESTHESIA EA ADD 15 MINS: Performed by: STUDENT IN AN ORGANIZED HEALTH CARE EDUCATION/TRAINING PROGRAM

## 2023-04-25 PROCEDURE — 71000016 HC POSTOP RECOV ADDL HR: Performed by: STUDENT IN AN ORGANIZED HEALTH CARE EDUCATION/TRAINING PROGRAM

## 2023-04-25 PROCEDURE — 80048 BASIC METABOLIC PNL TOTAL CA: CPT | Performed by: STUDENT IN AN ORGANIZED HEALTH CARE EDUCATION/TRAINING PROGRAM

## 2023-04-25 PROCEDURE — D9220A PRA ANESTHESIA: ICD-10-PCS | Mod: ANES,,, | Performed by: ANESTHESIOLOGY

## 2023-04-25 PROCEDURE — 93010 ELECTROCARDIOGRAM REPORT: CPT | Mod: ,,, | Performed by: INTERNAL MEDICINE

## 2023-04-25 PROCEDURE — 27201423 OPTIME MED/SURG SUP & DEVICES STERILE SUPPLY: Performed by: STUDENT IN AN ORGANIZED HEALTH CARE EDUCATION/TRAINING PROGRAM

## 2023-04-25 PROCEDURE — 71000033 HC RECOVERY, INTIAL HOUR: Performed by: STUDENT IN AN ORGANIZED HEALTH CARE EDUCATION/TRAINING PROGRAM

## 2023-04-25 PROCEDURE — 49324 PR LAP INSERTION TUNNELED INTRAPERITONEAL CATHETER: ICD-10-PCS | Mod: ,,, | Performed by: STUDENT IN AN ORGANIZED HEALTH CARE EDUCATION/TRAINING PROGRAM

## 2023-04-25 PROCEDURE — 36415 COLL VENOUS BLD VENIPUNCTURE: CPT | Performed by: STUDENT IN AN ORGANIZED HEALTH CARE EDUCATION/TRAINING PROGRAM

## 2023-04-25 PROCEDURE — 93010 EKG 12-LEAD: ICD-10-PCS | Mod: ,,, | Performed by: INTERNAL MEDICINE

## 2023-04-25 PROCEDURE — 37000008 HC ANESTHESIA 1ST 15 MINUTES: Performed by: STUDENT IN AN ORGANIZED HEALTH CARE EDUCATION/TRAINING PROGRAM

## 2023-04-25 PROCEDURE — 63600175 PHARM REV CODE 636 W HCPCS: Performed by: STUDENT IN AN ORGANIZED HEALTH CARE EDUCATION/TRAINING PROGRAM

## 2023-04-25 PROCEDURE — 71000015 HC POSTOP RECOV 1ST HR: Performed by: STUDENT IN AN ORGANIZED HEALTH CARE EDUCATION/TRAINING PROGRAM

## 2023-04-25 PROCEDURE — C1750 CATH, HEMODIALYSIS,LONG-TERM: HCPCS | Performed by: STUDENT IN AN ORGANIZED HEALTH CARE EDUCATION/TRAINING PROGRAM

## 2023-04-25 PROCEDURE — D9220A PRA ANESTHESIA: ICD-10-PCS | Mod: CRNA,,, | Performed by: NURSE ANESTHETIST, CERTIFIED REGISTERED

## 2023-04-25 PROCEDURE — 49326 PR LAP OMENTOPEXY ADD-ON: ICD-10-PCS | Mod: ,,, | Performed by: STUDENT IN AN ORGANIZED HEALTH CARE EDUCATION/TRAINING PROGRAM

## 2023-04-25 PROCEDURE — 36000708 HC OR TIME LEV III 1ST 15 MIN: Performed by: STUDENT IN AN ORGANIZED HEALTH CARE EDUCATION/TRAINING PROGRAM

## 2023-04-25 PROCEDURE — D9220A PRA ANESTHESIA: Mod: ANES,,, | Performed by: ANESTHESIOLOGY

## 2023-04-25 PROCEDURE — 71000039 HC RECOVERY, EACH ADD'L HOUR: Performed by: STUDENT IN AN ORGANIZED HEALTH CARE EDUCATION/TRAINING PROGRAM

## 2023-04-25 PROCEDURE — 93005 ELECTROCARDIOGRAM TRACING: CPT

## 2023-04-25 PROCEDURE — D9220A PRA ANESTHESIA: Mod: CRNA,,, | Performed by: NURSE ANESTHETIST, CERTIFIED REGISTERED

## 2023-04-25 PROCEDURE — 63600175 PHARM REV CODE 636 W HCPCS: Performed by: NURSE ANESTHETIST, CERTIFIED REGISTERED

## 2023-04-25 DEVICE — KIT PERITONEAL DIALYS 62.0CM: Type: IMPLANTABLE DEVICE | Site: ABDOMEN | Status: FUNCTIONAL

## 2023-04-25 RX ORDER — HYDROMORPHONE HYDROCHLORIDE 2 MG/ML
0.2 INJECTION, SOLUTION INTRAMUSCULAR; INTRAVENOUS; SUBCUTANEOUS EVERY 5 MIN PRN
Status: DISCONTINUED | OUTPATIENT
Start: 2023-04-25 | End: 2023-04-25 | Stop reason: HOSPADM

## 2023-04-25 RX ORDER — VASOPRESSIN 20 [USP'U]/ML
INJECTION, SOLUTION INTRAMUSCULAR; SUBCUTANEOUS
Status: DISCONTINUED | OUTPATIENT
Start: 2023-04-25 | End: 2023-04-25

## 2023-04-25 RX ORDER — HYDROCODONE BITARTRATE AND ACETAMINOPHEN 5; 325 MG/1; MG/1
1 TABLET ORAL EVERY 4 HOURS PRN
Status: DISCONTINUED | OUTPATIENT
Start: 2023-04-25 | End: 2023-04-25 | Stop reason: HOSPADM

## 2023-04-25 RX ORDER — ONDANSETRON 2 MG/ML
4 INJECTION INTRAMUSCULAR; INTRAVENOUS DAILY PRN
Status: DISCONTINUED | OUTPATIENT
Start: 2023-04-25 | End: 2023-04-25 | Stop reason: HOSPADM

## 2023-04-25 RX ORDER — ROCURONIUM BROMIDE 10 MG/ML
INJECTION, SOLUTION INTRAVENOUS
Status: DISCONTINUED | OUTPATIENT
Start: 2023-04-25 | End: 2023-04-25

## 2023-04-25 RX ORDER — ACETAMINOPHEN 10 MG/ML
INJECTION, SOLUTION INTRAVENOUS
Status: DISCONTINUED | OUTPATIENT
Start: 2023-04-25 | End: 2023-04-25

## 2023-04-25 RX ORDER — ETOMIDATE 2 MG/ML
INJECTION INTRAVENOUS
Status: DISCONTINUED | OUTPATIENT
Start: 2023-04-25 | End: 2023-04-25

## 2023-04-25 RX ORDER — ONDANSETRON 2 MG/ML
INJECTION INTRAMUSCULAR; INTRAVENOUS
Status: DISCONTINUED | OUTPATIENT
Start: 2023-04-25 | End: 2023-04-25

## 2023-04-25 RX ORDER — DEXAMETHASONE SODIUM PHOSPHATE 4 MG/ML
INJECTION, SOLUTION INTRA-ARTICULAR; INTRALESIONAL; INTRAMUSCULAR; INTRAVENOUS; SOFT TISSUE
Status: DISCONTINUED | OUTPATIENT
Start: 2023-04-25 | End: 2023-04-25

## 2023-04-25 RX ORDER — HYDROCODONE BITARTRATE AND ACETAMINOPHEN 5; 325 MG/1; MG/1
1 TABLET ORAL EVERY 4 HOURS PRN
Qty: 5 TABLET | Refills: 0 | Status: SHIPPED | OUTPATIENT
Start: 2023-04-25 | End: 2023-08-22

## 2023-04-25 RX ORDER — AMOXICILLIN 250 MG
1 CAPSULE ORAL 2 TIMES DAILY
COMMUNITY
Start: 2023-04-25 | End: 2023-08-22

## 2023-04-25 RX ORDER — FENTANYL CITRATE 50 UG/ML
25 INJECTION, SOLUTION INTRAMUSCULAR; INTRAVENOUS EVERY 5 MIN PRN
Status: ACTIVE | OUTPATIENT
Start: 2023-04-25 | End: 2023-04-25

## 2023-04-25 RX ORDER — CEFAZOLIN SODIUM 2 G/50ML
2 SOLUTION INTRAVENOUS
Status: COMPLETED | OUTPATIENT
Start: 2023-04-25 | End: 2023-04-25

## 2023-04-25 RX ORDER — LIDOCAINE HYDROCHLORIDE 20 MG/ML
INJECTION INTRAVENOUS
Status: DISCONTINUED | OUTPATIENT
Start: 2023-04-25 | End: 2023-04-25

## 2023-04-25 RX ORDER — OXYCODONE AND ACETAMINOPHEN 5; 325 MG/1; MG/1
1 TABLET ORAL
Status: DISCONTINUED | OUTPATIENT
Start: 2023-04-25 | End: 2023-04-25 | Stop reason: HOSPADM

## 2023-04-25 RX ORDER — PROCHLORPERAZINE EDISYLATE 5 MG/ML
5 INJECTION INTRAMUSCULAR; INTRAVENOUS EVERY 30 MIN PRN
Status: ACTIVE | OUTPATIENT
Start: 2023-04-25 | End: 2023-04-25

## 2023-04-25 RX ORDER — LIDOCAINE HYDROCHLORIDE 10 MG/ML
INJECTION INFILTRATION; PERINEURAL
Status: DISCONTINUED | OUTPATIENT
Start: 2023-04-25 | End: 2023-04-25 | Stop reason: HOSPADM

## 2023-04-25 RX ORDER — FENTANYL CITRATE 50 UG/ML
INJECTION, SOLUTION INTRAMUSCULAR; INTRAVENOUS
Status: DISCONTINUED | OUTPATIENT
Start: 2023-04-25 | End: 2023-04-25

## 2023-04-25 RX ORDER — HEPARIN SODIUM 10000 [USP'U]/ML
INJECTION, SOLUTION INTRAVENOUS; SUBCUTANEOUS
Status: DISCONTINUED | OUTPATIENT
Start: 2023-04-25 | End: 2023-04-25 | Stop reason: HOSPADM

## 2023-04-25 RX ORDER — BUPIVACAINE HYDROCHLORIDE 2.5 MG/ML
INJECTION, SOLUTION EPIDURAL; INFILTRATION; INTRACAUDAL
Status: DISCONTINUED | OUTPATIENT
Start: 2023-04-25 | End: 2023-04-25 | Stop reason: HOSPADM

## 2023-04-25 RX ADMIN — SODIUM CHLORIDE: 0.9 INJECTION, SOLUTION INTRAVENOUS at 07:04

## 2023-04-25 RX ADMIN — CEFAZOLIN SODIUM 2 G: 2 SOLUTION INTRAVENOUS at 09:04

## 2023-04-25 RX ADMIN — ACETAMINOPHEN 1000 MG: 10 INJECTION, SOLUTION INTRAVENOUS at 08:04

## 2023-04-25 RX ADMIN — DEXAMETHASONE SODIUM PHOSPHATE 4 MG: 4 INJECTION, SOLUTION INTRA-ARTICULAR; INTRALESIONAL; INTRAMUSCULAR; INTRAVENOUS; SOFT TISSUE at 08:04

## 2023-04-25 RX ADMIN — ROCURONIUM BROMIDE 25 MG: 10 INJECTION, SOLUTION INTRAVENOUS at 08:04

## 2023-04-25 RX ADMIN — VASOPRESSIN 1 UNITS: 20 INJECTION, SOLUTION INTRAMUSCULAR; SUBCUTANEOUS at 09:04

## 2023-04-25 RX ADMIN — HYPROMELLOSE 2910 2 DROP: 5 SOLUTION OPHTHALMIC at 08:04

## 2023-04-25 RX ADMIN — ETOMIDATE 14 MG: 2 INJECTION, SOLUTION INTRAVENOUS at 08:04

## 2023-04-25 RX ADMIN — VASOPRESSIN 1 UNITS: 20 INJECTION, SOLUTION INTRAMUSCULAR; SUBCUTANEOUS at 08:04

## 2023-04-25 RX ADMIN — LIDOCAINE HYDROCHLORIDE 100 MG: 20 INJECTION, SOLUTION INTRAVENOUS at 08:04

## 2023-04-25 RX ADMIN — SUGAMMADEX 200 MG: 100 INJECTION, SOLUTION INTRAVENOUS at 09:04

## 2023-04-25 RX ADMIN — ESMOLOL HYDROCHLORIDE 10 MG: 100 INJECTION, SOLUTION INTRAVENOUS at 09:04

## 2023-04-25 RX ADMIN — FENTANYL CITRATE 25 MCG: 50 INJECTION, SOLUTION INTRAMUSCULAR; INTRAVENOUS at 08:04

## 2023-04-25 RX ADMIN — ONDANSETRON 8 MG: 2 INJECTION, SOLUTION INTRAMUSCULAR; INTRAVENOUS at 09:04

## 2023-04-25 NOTE — TRANSFER OF CARE
"Anesthesia Transfer of Care Note    Patient: Myesha Quinones    Procedure(s) Performed: Procedure(s) (LRB):  INSERTION, CATHETER, DIALYSIS, PERITONEAL, LAPAROSCOPIC (N/A)    Patient location: PACU    Anesthesia Type: general    Transport from OR: Transported from OR on 2-3 L/min O2 by NC with adequate spontaneous ventilation    Post pain: adequate analgesia    Post assessment: no apparent anesthetic complications    Post vital signs: stable    Level of consciousness: awake    Nausea/Vomiting: no nausea/vomiting    Complications: none    Transfer of care protocol was followed      Last vitals:   Visit Vitals  BP (!) 185/87 (BP Location: Right arm, Patient Position: Lying)   Pulse 85   Temp 36.8 °C (98.2 °F) (Skin)   Resp 18   Ht 5' 3" (1.6 m)   Wt 54.4 kg (120 lb)   LMP  (LMP Unknown)   SpO2 98%   Breastfeeding No   BMI 21.26 kg/m²     "

## 2023-04-25 NOTE — PLAN OF CARE
Pt meets OPS discharge criteria, VSS, tolerated clear liquids. Able to void, amb to bathroom without diff. Surg site with scant bloody drainage, reinforced with extra medipore tape. Denies pain, mildly sore 2/10, refused offer of pain med. Ready for discharge.

## 2023-04-25 NOTE — ANESTHESIA PROCEDURE NOTES
Intubation    Date/Time: 4/25/2023 8:50 AM  Performed by: Taylor Alfonso CRNA  Authorized by: [place] Davila MD     Intubation:     Induction:  Intravenous    Intubated:  Postinduction    Mask Ventilation:  Easy mask    Attempts:  1    Attempted By:  CRNA    Method of Intubation:  Video laryngoscopy    Blade:  Severino 3    Laryngeal View Grade: Grade I - full view of cords      Difficult Airway Encountered?: No      Complications:  None    Airway Device:  Oral endotracheal tube    Airway Device Size:  7.0    Style/Cuff Inflation:  Cuffed (inflated to minimal occlusive pressure)    Tube secured:  21    Secured at:  The lips    Placement Verified By:  Capnometry    Complicating Factors:  None    Findings Post-Intubation:  BS equal bilateral and atraumatic/condition of teeth unchanged

## 2023-04-25 NOTE — PLAN OF CARE
Patient has met PACU discharge criteria, VSS, pain well controlled. Family updated by phone. Released from PACU by Anesthesia MD.

## 2023-04-25 NOTE — OP NOTE
DATE OF PROCEDURE: 04/25/2023    PREOPERATIVE DIAGNOSIS: CKD V    POSTOPERATIVE DIAGNOSIS: CKD V    PROCEDURE: Laparoscopic placement of peritoneal dialysis catheter.  Laparoscopic lysis of adhesions  Laparoscopic omentopexy    SURGEON: Marcelo Isaac M.D.    ASSISTANT: Braden Dias PGY2    ANESTHESIA: General endotracheal prep chlorhexidine.    ESTIMATED BLOOD LOSS: Minimal.    SPECIMENS: None.    INDICATIONS: The patient is an 83 y.o. female, who has CKD V and is planning to initiate peritoneal dialysis. The risks of surgery were discussed with   the patient including bleeding, infection, pain, scar, wound complications,   injury to local structures, wanting more extensive surgery, and potential need   for further surgery. The patient demonstrated understanding of these risks and   a consent form was obtained.    PROCEDURE: The patient was identified in the Preoperative Unit and taken back    to the Operating Room, laid supine on the operating room table. IV antibiotics    were administered prior to the induction of general anesthesia. General    anesthesia was induced without complication. The patient was then prepped and    draped in a standard sterile fashion. Local anesthesia was infiltrated into the  skin and subcutaneous tissues to all the incision site. A 5 mm incision was    made in the Left upper quadrant with an #11 blade scalpel. visiport entrance was    Performed without injury.  There was no intraabdominal pathology noted.  An additional left sided 5mm port was inserted. There were omental adhesions noted in the central anterior abdominal wall. These requried lysis using cautery scissors because it was in the planned area of the catheter. The omentum was then sutured to the falciform using a suture passer and a 0 vicryl.   An 8mm trocar was placed in the left lateral infra-umbilical location. It was tunneled  In the posterior rectus sheath for a few centimeters before entry into the peritoneum.     The catheter was then Positioned into the deep pelvis space.    It then tunneled in the subcutaneous space to a site superior and    lateral. The 2 cuffs were confirmed to be in the posterior rectus sheath and in    the subcutaneous space. The catheter was flushed and aspirated and then    positioned to gravity and good back flow was appreciated. The skin incisions were  closed using 4-0 Monocryl suture in interrupted fashion. Dermabond was applied  and sterile dressing was applied to the catheter. The patient was awakened    from general anesthesia without complication and returned to the Postoperative    Recovery Unit in stable condition. At the end of the case, sponge, instrument    and needle counts were correct on 2 occasions. I was present and scrubbed    throughout the entirety of the case.        COMPLICATIONS: None.    CONDITION: Stable.    IMPLANTS: 62cm Peritoneal dialysis catheter.

## 2023-04-25 NOTE — ANESTHESIA PREPROCEDURE EVALUATION
04/25/2023  Myesha Quinones is a 83 y.o., female.      Pre-op Assessment    I have reviewed the Patient Summary Reports.     I have reviewed the Nursing Notes. I have reviewed the NPO Status.   I have reviewed the Medications.     Review of Systems  Anesthesia Hx:  Denies Family Hx of Anesthesia complications.   Denies Personal Hx of Anesthesia complications.   Cardiovascular:   Hypertension CHF EF reportedly 55%  Has ICD   Renal/:   Chronic Renal Disease, CKD    Endocrine:   Diabetes        Physical Exam    Airway:  Mallampati: II   Mouth Opening: Normal  Neck ROM: Normal ROM        Anesthesia Plan  Type of Anesthesia, risks & benefits discussed:    Anesthesia Type: Gen ETT  Intra-op Monitoring Plan: Standard ASA Monitors  Post Op Pain Control Plan: multimodal analgesia  Induction:  IV  Airway Plan: Video  Informed Consent: Informed consent signed with the Patient and all parties understand the risks and agree with anesthesia plan.  All questions answered.   ASA Score: 4  Day of Surgery Review of History & Physical: H&P Update referred to the surgeon/provider.    Ready For Surgery From Anesthesia Perspective.     .

## 2023-04-25 NOTE — ANESTHESIA POSTPROCEDURE EVALUATION
Anesthesia Post Evaluation    Patient: Myesha Quinones    Procedure(s) Performed: Procedure(s) (LRB):  INSERTION, CATHETER, DIALYSIS, PERITONEAL, LAPAROSCOPIC (N/A)    Final Anesthesia Type: general      Patient location during evaluation: PACU  Patient participation: Yes- Able to Participate  Level of consciousness: awake and alert  Post-procedure vital signs: reviewed and stable  Pain management: adequate  Airway patency: patent    PONV status at discharge: No PONV  Anesthetic complications: no      Cardiovascular status: stable  Respiratory status: spontaneous ventilation  Hydration status: euvolemic  Follow-up not needed.          Vitals Value Taken Time   /66 04/25/23 1120   Temp 36.3 °C (97.3 °F) 04/25/23 1120   Pulse 90 04/25/23 1120   Resp 18 04/25/23 1120   SpO2 95 % 04/25/23 1120         Event Time   Out of Recovery 11:08:00         Pain/Juan Diego Score: Juan Diego Score: 9 (4/25/2023 11:20 AM)

## 2023-04-26 NOTE — DISCHARGE SUMMARY
[facility] ([facility])  Short Stay  Discharge Summary    Admit Date: 4/25/2023    Discharge Date and Time: 4/25/2023  1:05 PM      Discharge Attending Physician: No att. providers found     Hospital Course (synopsis of major diagnoses, care, treatment, and services provided during the course of the hospital stay): Patient brought in for elective surgery. Underwent procedure without complication and was discharged.       Final Diagnoses:    Principal Problem: CKD (chronic kidney disease), stage V   Secondary Diagnoses:   Active Hospital Problems    Diagnosis  POA    *CKD (chronic kidney disease), stage V [N18.5]  Yes      Resolved Hospital Problems   No resolved problems to display.       Discharged Condition: stable    Disposition: Home or Self Care    Follow up/Patient Instructions:    Medications:  Reconciled Home Medications:      Medication List        START taking these medications      HYDROcodone-acetaminophen 5-325 mg per tablet  Commonly known as: NORCO  Take 1 tablet by mouth every 4 (four) hours as needed for Pain.     senna-docusate 8.6-50 mg 8.6-50 mg per tablet  Commonly known as: PERICOLACE  Take 1 tablet by mouth 2 (two) times daily.            CONTINUE taking these medications      acetaminophen 500 MG tablet  Commonly known as: TYLENOL  Take 500 mg by mouth as needed.     albuterol 90 mcg/actuation inhaler  Commonly known as: PROVENTIL/VENTOLIN HFA  Inhale 2 puffs into the lungs every 6 (six) hours as needed for Wheezing. Rescue     albuterol-ipratropium 2.5 mg-0.5 mg/3 mL nebulizer solution  Commonly known as: DUO-NEB  TAKE 3 MLS BY NEBULIZATION EVERY 4 (FOUR) HOURS AS NEEDED FOR WHEEZING.     aspirin 81 MG Chew  Take 81 mg by mouth once daily.     blood sugar diagnostic Strp  Commonly known as: ACCU-CHEK HECTOR  Uses Accu-Check Hector meter to test BG 4x/day     carvediloL 25 MG tablet  Commonly known as: COREG  TAKE 2 TABLETS (50 MG TOTAL) BY MOUTH 2 (TWO) TIMES DAILY.     cholecalciferol  "(vitamin D3) 50 mcg (2,000 unit) Tab  Commonly known as: VITAMIN D3  Take 1 tablet by mouth once daily.     cyanocobalamin 100 MCG tablet  Commonly known as: VITAMIN B-12  Take 100 mcg by mouth once daily.     epoetin [place]-epbx 10,000 unit/mL imjection  Commonly known as: RETACRIT  Inject 1 mL (10,000 Units total) into the skin every 30 days.     estradioL 0.01 % (0.1 mg/gram) vaginal cream  Commonly known as: ESTRACE  Place 1 g vaginally every other day.     fluticasone propionate 50 mcg/actuation nasal spray  Commonly known as: FLONASE  1 spray (50 mcg total) by Each Nostril route 2 (two) times a day.     furosemide 40 MG tablet  Commonly known as: LASIX  Take 1 tablet (40 mg total) by mouth once daily.     hydrALAZINE 100 MG tablet  Commonly known as: APRESOLINE  Take 1 tablet (100 mg total) by mouth 2 (two) times daily.     insulin glargine 100 units/mL SubQ pen  Commonly known as: LANTUS SOLOSTAR U-100 INSULIN  Inject 10 Units into the skin every evening.     lancets Misc  Commonly known as: ACCU-CHEK SOFTCLIX LANCETS  Uses Accu-Chek Angelica meter to test BG 2x/day     magnesium oxide 400 mg (241.3 mg magnesium) tablet  Commonly known as: MAG-OX  Take 1 tablet (400 mg total) by mouth once daily.     montelukast 10 mg tablet  Commonly known as: SINGULAIR  Take 1 tablet (10 mg total) by mouth once daily.     nitroGLYCERIN 0.4 MG SL tablet  Commonly known as: NITROSTAT  Place 0.4 mg under the tongue every 5 (five) minutes as needed for Chest pain.     omega-3 fatty acids 500 mg Cap  Take 1 capsule by mouth Daily.     pen needle, diabetic 31 gauge x 3/16" Ndle  Commonly known as: BD ULTRA-FINE MINI PEN NEEDLE  Use with insulin twice a day.     polyethylene glycol 17 gram Pwpk  Commonly known as: GLYCOLAX  Take 17 g by mouth daily as needed (constipation).     pravastatin 40 MG tablet  Commonly known as: PRAVACHOL  Take 1 tablet (40 mg total) by mouth once daily.     pulse oximeter device  Commonly known as: pulse " oximeter  by Apply Externally route 2 (two) times a day. Use twice daily at 8 AM and 3 PM and record the value in MyChart as directed.     sodium bicarbonate 650 MG tablet  Take 2 tablets (1,300 mg total) by mouth 3 (three) times daily.     TRULICITY 1.5 mg/0.5 mL pen injector  Generic drug: dulaglutide  Inject 1.5 mg into the skin every 7 days.     valsartan 80 MG tablet  Commonly known as: DIOVAN  Take 1 tablet (80 mg total) by mouth once daily.            Discharge Procedure Orders   Diet general     Call MD for:  temperature >100.4     Call MD for:  persistent nausea and vomiting     Call MD for:  severe uncontrolled pain     Leave dressing on - Keep it clean, dry, and intact until clinic visit      Follow-up Information       Marcelo Isaac MD Follow up in 2 week(s).    Specialties: Surgery, General Surgery  Contact information:  [address]  [address]  [place] [address] [phone]

## 2023-04-28 ENCOUNTER — PATIENT MESSAGE (OUTPATIENT)
Dept: FAMILY MEDICINE | Facility: CLINIC | Age: 83
End: 2023-04-28
Payer: MEDICARE

## 2023-04-28 VITALS — SYSTOLIC BLOOD PRESSURE: 128 MMHG | DIASTOLIC BLOOD PRESSURE: 78 MMHG

## 2023-04-28 DIAGNOSIS — N18.4 CKD (CHRONIC KIDNEY DISEASE) STAGE 4, GFR 15-29 ML/MIN: Primary | ICD-10-CM

## 2023-05-03 ENCOUNTER — CLINICAL SUPPORT (OUTPATIENT)
Dept: CARDIOLOGY | Facility: HOSPITAL | Age: 83
End: 2023-05-03
Payer: MEDICARE

## 2023-05-03 DIAGNOSIS — Z95.810 PRESENCE OF AUTOMATIC (IMPLANTABLE) CARDIAC DEFIBRILLATOR: ICD-10-CM

## 2023-05-03 PROCEDURE — 93295 DEV INTERROG REMOTE 1/2/MLT: CPT | Mod: ,,, | Performed by: INTERNAL MEDICINE

## 2023-05-03 PROCEDURE — 93296 REM INTERROG EVL PM/IDS: CPT | Performed by: INTERNAL MEDICINE

## 2023-05-03 PROCEDURE — 93295 CARDIAC DEVICE CHECK - REMOTE: ICD-10-PCS | Mod: ,,, | Performed by: INTERNAL MEDICINE

## 2023-05-08 ENCOUNTER — PATIENT MESSAGE (OUTPATIENT)
Dept: FAMILY MEDICINE | Facility: CLINIC | Age: 83
End: 2023-05-08
Payer: MEDICARE

## 2023-05-08 DIAGNOSIS — N18.4 CKD (CHRONIC KIDNEY DISEASE) STAGE 4, GFR 15-29 ML/MIN: Primary | ICD-10-CM

## 2023-05-09 ENCOUNTER — LAB VISIT (OUTPATIENT)
Dept: LAB | Facility: HOSPITAL | Age: 83
End: 2023-05-09
Payer: MEDICARE

## 2023-05-09 DIAGNOSIS — Z79.4 CONTROLLED TYPE 2 DIABETES MELLITUS WITH BOTH EYES AFFECTED BY MILD NONPROLIFERATIVE RETINOPATHY AND MACULAR EDEMA, WITH LONG-TERM CURRENT USE OF INSULIN: ICD-10-CM

## 2023-05-09 DIAGNOSIS — E11.3213 CONTROLLED TYPE 2 DIABETES MELLITUS WITH BOTH EYES AFFECTED BY MILD NONPROLIFERATIVE RETINOPATHY AND MACULAR EDEMA, WITH LONG-TERM CURRENT USE OF INSULIN: ICD-10-CM

## 2023-05-09 LAB
BACTERIA #/AREA URNS HPF: ABNORMAL /HPF
BILIRUB UR QL STRIP: NEGATIVE
CLARITY UR: CLEAR
COLOR UR: COLORLESS
CREAT UR-MCNC: 24 MG/DL (ref 15–325)
GLUCOSE UR QL STRIP: NEGATIVE
HGB UR QL STRIP: NEGATIVE
HYALINE CASTS #/AREA URNS LPF: 1 /LPF
KETONES UR QL STRIP: NEGATIVE
LEUKOCYTE ESTERASE UR QL STRIP: ABNORMAL
MICROSCOPIC COMMENT: ABNORMAL
NITRITE UR QL STRIP: NEGATIVE
PH UR STRIP: 7 [PH] (ref 5–8)
PROT UR QL STRIP: ABNORMAL
PROT UR-MCNC: 137 MG/DL (ref 0–15)
PROT/CREAT UR: 5.71 MG/G{CREAT} (ref 0–0.2)
RBC #/AREA URNS HPF: 3 /HPF (ref 0–4)
SP GR UR STRIP: 1.01 (ref 1–1.03)
SQUAMOUS #/AREA URNS HPF: 8 /HPF
URN SPEC COLLECT METH UR: ABNORMAL
UROBILINOGEN UR STRIP-ACNC: NEGATIVE EU/DL
WBC #/AREA URNS HPF: 25 /HPF (ref 0–5)

## 2023-05-09 PROCEDURE — 81000 URINALYSIS NONAUTO W/SCOPE: CPT | Performed by: INTERNAL MEDICINE

## 2023-05-09 PROCEDURE — 82570 ASSAY OF URINE CREATININE: CPT | Performed by: INTERNAL MEDICINE

## 2023-05-10 ENCOUNTER — OFFICE VISIT (OUTPATIENT)
Dept: SURGERY | Facility: CLINIC | Age: 83
End: 2023-05-10
Payer: MEDICARE

## 2023-05-10 ENCOUNTER — TELEPHONE (OUTPATIENT)
Dept: SURGERY | Facility: CLINIC | Age: 83
End: 2023-05-10

## 2023-05-10 VITALS — BODY MASS INDEX: 22.05 KG/M2 | WEIGHT: 124.44 LBS | HEIGHT: 63 IN

## 2023-05-10 DIAGNOSIS — N18.5 CKD (CHRONIC KIDNEY DISEASE), STAGE V: Primary | ICD-10-CM

## 2023-05-10 PROCEDURE — 1126F AMNT PAIN NOTED NONE PRSNT: CPT | Mod: CPTII,S$GLB,, | Performed by: STUDENT IN AN ORGANIZED HEALTH CARE EDUCATION/TRAINING PROGRAM

## 2023-05-10 PROCEDURE — 99999 PR PBB SHADOW E&M-EST. PATIENT-LVL II: ICD-10-PCS | Mod: PBBFAC,,, | Performed by: STUDENT IN AN ORGANIZED HEALTH CARE EDUCATION/TRAINING PROGRAM

## 2023-05-10 PROCEDURE — 1101F PR PT FALLS ASSESS DOC 0-1 FALLS W/OUT INJ PAST YR: ICD-10-PCS | Mod: CPTII,S$GLB,, | Performed by: STUDENT IN AN ORGANIZED HEALTH CARE EDUCATION/TRAINING PROGRAM

## 2023-05-10 PROCEDURE — 99024 POSTOP FOLLOW-UP VISIT: CPT | Mod: S$GLB,,, | Performed by: STUDENT IN AN ORGANIZED HEALTH CARE EDUCATION/TRAINING PROGRAM

## 2023-05-10 PROCEDURE — 1101F PT FALLS ASSESS-DOCD LE1/YR: CPT | Mod: CPTII,S$GLB,, | Performed by: STUDENT IN AN ORGANIZED HEALTH CARE EDUCATION/TRAINING PROGRAM

## 2023-05-10 PROCEDURE — 3288F FALL RISK ASSESSMENT DOCD: CPT | Mod: CPTII,S$GLB,, | Performed by: STUDENT IN AN ORGANIZED HEALTH CARE EDUCATION/TRAINING PROGRAM

## 2023-05-10 PROCEDURE — 99999 PR PBB SHADOW E&M-EST. PATIENT-LVL II: CPT | Mod: PBBFAC,,, | Performed by: STUDENT IN AN ORGANIZED HEALTH CARE EDUCATION/TRAINING PROGRAM

## 2023-05-10 PROCEDURE — 1159F MED LIST DOCD IN RCRD: CPT | Mod: CPTII,S$GLB,, | Performed by: STUDENT IN AN ORGANIZED HEALTH CARE EDUCATION/TRAINING PROGRAM

## 2023-05-10 PROCEDURE — 99024 PR POST-OP FOLLOW-UP VISIT: ICD-10-PCS | Mod: S$GLB,,, | Performed by: STUDENT IN AN ORGANIZED HEALTH CARE EDUCATION/TRAINING PROGRAM

## 2023-05-10 PROCEDURE — 1159F PR MEDICATION LIST DOCUMENTED IN MEDICAL RECORD: ICD-10-PCS | Mod: CPTII,S$GLB,, | Performed by: STUDENT IN AN ORGANIZED HEALTH CARE EDUCATION/TRAINING PROGRAM

## 2023-05-10 PROCEDURE — 3288F PR FALLS RISK ASSESSMENT DOCUMENTED: ICD-10-PCS | Mod: CPTII,S$GLB,, | Performed by: STUDENT IN AN ORGANIZED HEALTH CARE EDUCATION/TRAINING PROGRAM

## 2023-05-10 PROCEDURE — 1126F PR PAIN SEVERITY QUANTIFIED, NO PAIN PRESENT: ICD-10-PCS | Mod: CPTII,S$GLB,, | Performed by: STUDENT IN AN ORGANIZED HEALTH CARE EDUCATION/TRAINING PROGRAM

## 2023-05-10 NOTE — PROGRESS NOTES
S/p PD catheter placement  Doing well  No pain, no TTP  Saw dr flores today  Set up for training  RTC prn

## 2023-05-10 NOTE — TELEPHONE ENCOUNTER
----- Message from Lorna Beal sent at 5/10/2023 10:59 AM CDT -----  Type:  Needs Medical Advice    Who Called: Dr. Borrero  Symptoms (please be specific): would like to speak with Dr. Isaac regarding pt     Would the patient rather a call back or a response via [facility]? Call   Best Call Back Number:  [phone]  Cell / office [phone]  Additional Information:

## 2023-05-14 NOTE — ASSESSMENT & PLAN NOTE
Presenting with urinary retention. UA in ED with leukocytes. Urine culture 6/1/22 prior admission grew Acinetobacter Baumannii  > 100,000 cfu/ml  - Start Cipro  - F/u urine culture     [facility]

## 2023-05-15 ENCOUNTER — OFFICE VISIT (OUTPATIENT)
Dept: CARDIOLOGY | Facility: CLINIC | Age: 83
End: 2023-05-15
Payer: MEDICARE

## 2023-05-15 VITALS
DIASTOLIC BLOOD PRESSURE: 62 MMHG | WEIGHT: 125 LBS | OXYGEN SATURATION: 98 % | BODY MASS INDEX: 22.15 KG/M2 | HEIGHT: 63 IN | SYSTOLIC BLOOD PRESSURE: 132 MMHG | HEART RATE: 70 BPM

## 2023-05-15 DIAGNOSIS — E78.5 HYPERLIPIDEMIA LDL GOAL <70: ICD-10-CM

## 2023-05-15 DIAGNOSIS — G47.33 OSA (OBSTRUCTIVE SLEEP APNEA): ICD-10-CM

## 2023-05-15 DIAGNOSIS — I15.2 HYPERTENSION ASSOCIATED WITH DIABETES: ICD-10-CM

## 2023-05-15 DIAGNOSIS — H35.033 HYPERTENSIVE RETINOPATHY, BILATERAL: ICD-10-CM

## 2023-05-15 DIAGNOSIS — Z95.810 BIVENTRICULAR ICD (IMPLANTABLE CARDIOVERTER-DEFIBRILLATOR) IN PLACE: ICD-10-CM

## 2023-05-15 DIAGNOSIS — I70.0 CALCIFICATION OF AORTA: ICD-10-CM

## 2023-05-15 DIAGNOSIS — E11.22 TYPE 2 DIABETES MELLITUS WITH STAGE 3B CHRONIC KIDNEY DISEASE, WITH LONG-TERM CURRENT USE OF INSULIN: ICD-10-CM

## 2023-05-15 DIAGNOSIS — E11.3213 CONTROLLED TYPE 2 DIABETES MELLITUS WITH BOTH EYES AFFECTED BY MILD NONPROLIFERATIVE RETINOPATHY AND MACULAR EDEMA, WITH LONG-TERM CURRENT USE OF INSULIN: ICD-10-CM

## 2023-05-15 DIAGNOSIS — N18.32 TYPE 2 DIABETES MELLITUS WITH STAGE 3B CHRONIC KIDNEY DISEASE, WITH LONG-TERM CURRENT USE OF INSULIN: ICD-10-CM

## 2023-05-15 DIAGNOSIS — Z79.4 CONTROLLED TYPE 2 DIABETES MELLITUS WITH BOTH EYES AFFECTED BY MILD NONPROLIFERATIVE RETINOPATHY AND MACULAR EDEMA, WITH LONG-TERM CURRENT USE OF INSULIN: ICD-10-CM

## 2023-05-15 DIAGNOSIS — E11.59 HYPERTENSION ASSOCIATED WITH DIABETES: ICD-10-CM

## 2023-05-15 DIAGNOSIS — I25.5 CARDIOMYOPATHY, ISCHEMIC: ICD-10-CM

## 2023-05-15 DIAGNOSIS — I44.7 LBBB (LEFT BUNDLE BRANCH BLOCK): ICD-10-CM

## 2023-05-15 DIAGNOSIS — N18.4 CKD (CHRONIC KIDNEY DISEASE) STAGE 4, GFR 15-29 ML/MIN: ICD-10-CM

## 2023-05-15 DIAGNOSIS — Z85.3 HISTORY OF BREAST CANCER: ICD-10-CM

## 2023-05-15 DIAGNOSIS — J44.1 CHRONIC OBSTRUCTIVE PULMONARY DISEASE WITH ACUTE EXACERBATION: ICD-10-CM

## 2023-05-15 DIAGNOSIS — Z79.4 TYPE 2 DIABETES MELLITUS WITH STAGE 3B CHRONIC KIDNEY DISEASE, WITH LONG-TERM CURRENT USE OF INSULIN: ICD-10-CM

## 2023-05-15 DIAGNOSIS — I50.42 CHRONIC COMBINED SYSTOLIC AND DIASTOLIC HEART FAILURE: Primary | ICD-10-CM

## 2023-05-15 PROCEDURE — 1126F PR PAIN SEVERITY QUANTIFIED, NO PAIN PRESENT: ICD-10-PCS | Mod: CPTII,S$GLB,, | Performed by: INTERNAL MEDICINE

## 2023-05-15 PROCEDURE — 1101F PR PT FALLS ASSESS DOC 0-1 FALLS W/OUT INJ PAST YR: ICD-10-PCS | Mod: CPTII,S$GLB,, | Performed by: INTERNAL MEDICINE

## 2023-05-15 PROCEDURE — 99999 PR PBB SHADOW E&M-EST. PATIENT-LVL V: CPT | Mod: PBBFAC,,, | Performed by: INTERNAL MEDICINE

## 2023-05-15 PROCEDURE — 3078F DIAST BP <80 MM HG: CPT | Mod: CPTII,S$GLB,, | Performed by: INTERNAL MEDICINE

## 2023-05-15 PROCEDURE — 3288F PR FALLS RISK ASSESSMENT DOCUMENTED: ICD-10-PCS | Mod: CPTII,S$GLB,, | Performed by: INTERNAL MEDICINE

## 2023-05-15 PROCEDURE — 1101F PT FALLS ASSESS-DOCD LE1/YR: CPT | Mod: CPTII,S$GLB,, | Performed by: INTERNAL MEDICINE

## 2023-05-15 PROCEDURE — 3075F PR MOST RECENT SYSTOLIC BLOOD PRESS GE 130-139MM HG: ICD-10-PCS | Mod: CPTII,S$GLB,, | Performed by: INTERNAL MEDICINE

## 2023-05-15 PROCEDURE — 99999 PR PBB SHADOW E&M-EST. PATIENT-LVL V: ICD-10-PCS | Mod: PBBFAC,,, | Performed by: INTERNAL MEDICINE

## 2023-05-15 PROCEDURE — 3075F SYST BP GE 130 - 139MM HG: CPT | Mod: CPTII,S$GLB,, | Performed by: INTERNAL MEDICINE

## 2023-05-15 PROCEDURE — 99214 PR OFFICE/OUTPT VISIT, EST, LEVL IV, 30-39 MIN: ICD-10-PCS | Mod: S$GLB,,, | Performed by: INTERNAL MEDICINE

## 2023-05-15 PROCEDURE — 3288F FALL RISK ASSESSMENT DOCD: CPT | Mod: CPTII,S$GLB,, | Performed by: INTERNAL MEDICINE

## 2023-05-15 PROCEDURE — 99214 OFFICE O/P EST MOD 30 MIN: CPT | Mod: S$GLB,,, | Performed by: INTERNAL MEDICINE

## 2023-05-15 PROCEDURE — 3078F PR MOST RECENT DIASTOLIC BLOOD PRESSURE < 80 MM HG: ICD-10-PCS | Mod: CPTII,S$GLB,, | Performed by: INTERNAL MEDICINE

## 2023-05-15 PROCEDURE — 1159F PR MEDICATION LIST DOCUMENTED IN MEDICAL RECORD: ICD-10-PCS | Mod: CPTII,S$GLB,, | Performed by: INTERNAL MEDICINE

## 2023-05-15 PROCEDURE — 99499 RISK ADDL DX/OHS AUDIT: ICD-10-PCS | Mod: S$GLB,,, | Performed by: INTERNAL MEDICINE

## 2023-05-15 PROCEDURE — 99499 UNLISTED E&M SERVICE: CPT | Mod: S$GLB,,, | Performed by: INTERNAL MEDICINE

## 2023-05-15 PROCEDURE — 1126F AMNT PAIN NOTED NONE PRSNT: CPT | Mod: CPTII,S$GLB,, | Performed by: INTERNAL MEDICINE

## 2023-05-15 PROCEDURE — 1159F MED LIST DOCD IN RCRD: CPT | Mod: CPTII,S$GLB,, | Performed by: INTERNAL MEDICINE

## 2023-05-15 RX ORDER — CARVEDILOL 25 MG/1
TABLET ORAL
Qty: 360 TABLET | Refills: 3 | Status: ON HOLD | OUTPATIENT
Start: 2023-05-15 | End: 2024-03-11

## 2023-05-15 RX ORDER — HYDRALAZINE HYDROCHLORIDE 100 MG/1
100 TABLET, FILM COATED ORAL 2 TIMES DAILY
Qty: 90 TABLET | Refills: 3 | Status: ON HOLD | OUTPATIENT
Start: 2023-05-15 | End: 2024-03-11 | Stop reason: HOSPADM

## 2023-05-15 RX ORDER — SODIUM BICARBONATE 650 MG/1
1300 TABLET ORAL 2 TIMES DAILY
Qty: 360 TABLET | Refills: 3 | Status: SHIPPED | OUTPATIENT
Start: 2023-05-15 | End: 2024-02-29

## 2023-05-15 NOTE — PROGRESS NOTES
Subjective:   Patient ID:  Myesha Quinones is a 83 y.o. female who presents for management of Congestive Heart Failure, Hyperlipidemia, and Hypertension      HPI:     She is here to establish cardiovascular care.  She is a past medical history of non-ischemic cardiomyopathy with a recovered ejection fraction, biV AICD in 2004, chronic kidney disease stage IV not on dialysis, hypertension, hyperlipidemia, insulin-dependent diabetes, COPD, obstructive sleep apnea with CPAP machine, and allergic bronchopulmonary aspergillosis.  She denies dyspnea or orthopnea.  No lower extremity edema.  No chest discomfort.  She is compliant and tolerate all her medications.       ICD interrogation 3/she has a peritoneal [id number] catheter in place.      ICD interrogation 3/2023: Normal functioning device.  99% bi V pacing. 2.1 yrs battery life.           Echo 6/2022    The estimated ejection fraction is 55%.  Grade II left ventricular diastolic dysfunction.  Normal right ventricular size with normal right ventricular systolic function.  Severe left atrial enlargement.  Mild mitral regurgitation.  Mild to moderate tricuspid regurgitation.  Intermediate central venous pressure (8 mmHg).  The estimated PA systolic pressure is 56 mmHg.  There is pulmonary hypertension.  There is a left pleural effusion.       Cr 2.6 (5/2023)  Gfr 15        Patient Active Problem List    Diagnosis Date Noted    CKD (chronic kidney disease), stage V 04/25/2023    Chronic kidney disease (CKD), stage V 04/06/2023    Type 2 diabetes mellitus with stage 3b chronic kidney disease, with long-term current use of insulin 10/07/2022    Pneumonia of left lower lobe due to Pseudomonas species 07/14/2022    Pleural effusion      Previously underwent thoracentesis on left in 7/2022.  Results were exudative, but negative cytology.  She now has the best breathing status and control of her heart failure that she has had since I have met her.  She is breathing well  and content with current clinical condition.  Her CT thorax from 2/2023 shows improvement in pleural fluid burden in comparison to studies obtained 2 weeks prior on x-ray.  I feel her current heart failure plan is working.  - I offered a thoracentesis in clinic today for removal of the pleural fluid on the left.  After prolonged discussion and shared-decision making, we decided to continue medical management with heart failure and diuretic therapy.  She was advised that if her shortness of breath worsens, or other symptoms arise, to notify me for repeat imaging and possible intervention.        ACP (advance care planning) 07/08/2022    HAP (hospital-acquired pneumonia) 07/08/2022    CKD (chronic kidney disease) stage 4, GFR 15-29 ml/min 07/08/2022    Acute hypoxemic respiratory failure 06/21/2022    UTI (urinary tract infection) 06/21/2022    Acute on chronic combined systolic and diastolic CHF (congestive heart failure) 06/20/2022    COVID-19 virus infection 06/20/2022    Essential hypertension 06/20/2022    Type 2 diabetes mellitus, with long-term current use of insulin 06/20/2022    Diet-controlled diabetes mellitus 04/26/2022    History of non anemic vitamin B12 deficiency 04/26/2022    Mycobacterium avium complex     Hyponatremia 04/02/2022    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     Unable to take steroidds due to hyperglycemia and recurrent heart failure.  Tried voriconazole with ID, but was unable to tolerate.  Now utilizing sputum clearance maneuvers and inhaler therapy for her asthma control with good effect. Continue.        Urinary retention 02/28/2022     82 yo F with urinary retention.  Lauren placed 2/28/22.   --recurrent retention, lauren replaced 4/1/22  --Pessary placed per urogyn and lauren removed  --admitted 6/2022 and incomplete emptying despite pessary        Microalbuminuria due to type 2 diabetes mellitus 01/26/2022    Current mild episode of major depressive disorder  without prior episode 01/25/2022    Personal history of COVID-19 07/23/2021    Obstructive sleep apnea 07/16/2021    Chronic combined systolic and diastolic heart failure 03/23/2021     - continue optimization of BP and heart failure as possible.      Moderate asthma 10/01/2020    Hyperlipidemia associated with type 2 diabetes mellitus 07/27/2020    Mild nonproliferative retinopathy due to secondary diabetes 07/27/2020    Macular edema due to secondary diabetes 07/27/2020    Seasonal allergies 06/28/2019     Continue flonase          Metabolic bone disease 01/21/2019    Weakness     Multiple lung nodules 04/04/2018     Improved from previous evaluation.  Would repeat CT thorax in 6 months time for re-evaluation of current findings, monitoring of tree-in-bud opacities, mucous impaction, pleural effusions, and bandlike atelectasis.  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 She is a previous patient of Dr. Amadeo Thomas. She was diagnosed with a right breast  carcinoma in  with a 2.5 cm infiltrating, poorly differentiated ductal carcinoma on mastectomy specimen, histologic grade III, nuclear grade II, and mitotic index 1 with negative margins and 2 out of 17 lymph nodes showing metastatic carcinoma.   The patient was treated with 4 cycles of Adriamycin and Cytoxan, followed by 4 cycles of Taxotere. She was then placed on tamoxifen for 5 years based on ER/SC status. She was then switched to Femara, and she completed 5 years in .     Dr. Jaime 2019 now that she is 10 years post treatment, 20 years post diagnosis        Hypertension associated with diabetes 2015    Tremor 2014    Dyspnea 2014    Biventricular ICD (implantable cardioverter-defibrillator) in place 2014    Asthma, chronic 2013     - continue Albuterol, Duonebs, Singulair, Flonase.  No longer on maintenance inhalers.      Meningioma 2013    Nontoxic multinodular goiter 2012    LBBB (left bundle branch block) 2012    History of nonmelanoma skin cancer 2012           Left Arm BP - Sittin/62  Reason for not completing BP on both arms: mastectomy        LABS    LAST HbA1c  Lab Results   Component Value Date    HGBA1C 5.5 2023       Lipid panel  Lab Results   Component Value Date    CHOL 96 (L) 2022    CHOL 110 (L) 2022    CHOL 156 2021     Lab Results   Component Value Date    HDL 39 (L) 2022    HDL 44 2022    HDL 47 2021     Lab Results   Component Value Date    LDLCALC 29.8 (L) 2022    LDLCALC 43.8 (L) 2022    LDLCALC 79.8 2021     Lab Results   Component Value Date    TRIG 136 2022    TRIG 111 2022    TRIG 146 2021     Lab Results   Component Value Date    CHOLHDL 40.6 2022    CHOLHDL 40.0 2022    CHOLHDL 30.1 2021            Review of Systems   Constitutional: Negative for diaphoresis, night sweats, weight gain and weight loss.   HENT:  Negative for  congestion.    Eyes:  Negative for blurred vision, discharge and double vision.   Cardiovascular:  Negative for chest pain, claudication, cyanosis, dyspnea on exertion, irregular heartbeat, leg swelling, near-syncope, orthopnea, palpitations, paroxysmal nocturnal dyspnea and syncope.   Respiratory:  Negative for cough, shortness of breath and wheezing.    Endocrine: Negative for cold intolerance, heat intolerance and polyphagia.   Hematologic/Lymphatic: Negative for adenopathy and bleeding problem. Does not bruise/bleed easily.   Skin:  Negative for dry skin and nail changes.   Musculoskeletal:  Negative for arthritis, back pain, falls, joint pain, myalgias and neck pain.   Gastrointestinal:  Negative for bloating, abdominal pain, change in bowel habit and constipation.   Genitourinary:  Negative for bladder incontinence, dysuria, flank pain, genital sores and missed menses.   Neurological:  Negative for aphonia, brief paralysis, difficulty with concentration, dizziness and weakness.   Psychiatric/Behavioral:  Negative for altered mental status and memory loss. The patient does not have insomnia.    Allergic/Immunologic: Negative for environmental allergies.     Objective:   Physical Exam  Constitutional:       Appearance: She is well-developed.      Interventions: She is not intubated.  HENT:      Head: Normocephalic and atraumatic.      Right Ear: External ear normal.      Left Ear: External ear normal.   Eyes:      General: No scleral icterus.        Right eye: No discharge.         Left eye: No discharge.      Conjunctiva/sclera: Conjunctivae normal.      Pupils: Pupils are equal, round, and reactive to light.   Neck:      Thyroid: No thyromegaly.      Vascular: Normal carotid pulses. No carotid bruit, hepatojugular reflux or JVD.      Trachea: No tracheal deviation.   Cardiovascular:      Rate and Rhythm: Normal rate and regular rhythm. No extrasystoles are present.     Chest Wall: PMI is not displaced.       Pulses:           Carotid pulses are 2+ on the right side and 2+ on the left side.       Radial pulses are 2+ on the right side and 2+ on the left side.        Femoral pulses are 2+ on the right side and 2+ on the left side.       Popliteal pulses are 2+ on the right side and 2+ on the left side.        Dorsalis pedis pulses are 2+ on the right side and 2+ on the left side.        Posterior tibial pulses are 2+ on the right side and 2+ on the left side.      Heart sounds: S1 normal and S2 normal. Heart sounds not distant. No midsystolic click. No murmur heard.    No friction rub. No gallop. No S3 sounds.   Pulmonary:      Effort: Pulmonary effort is normal. No tachypnea, bradypnea, accessory muscle usage or respiratory distress. She is not intubated.      Breath sounds: No stridor. Examination of the right-lower field reveals rhonchi. Examination of the left-lower field reveals rhonchi. Rhonchi present. No decreased breath sounds, wheezing or rales.   Chest:      Chest wall: No tenderness.   Abdominal:      General: There is no distension or abdominal bruit.      Palpations: There is no mass or pulsatile mass.      Tenderness: There is no abdominal tenderness. There is no guarding or rebound.   Musculoskeletal:         General: No tenderness. Normal range of motion.      Cervical back: Normal range of motion and neck supple.   Lymphadenopathy:      Cervical: No cervical adenopathy.      Comments:     Chronic R arm edema s/p mastectomy          Skin:     General: Skin is warm.      Coloration: Skin is not pale.      Findings: No erythema or rash.   Neurological:      Mental Status: She is alert and oriented to person, place, and time.      Cranial Nerves: No cranial nerve deficit.      Coordination: Coordination normal.      Deep Tendon Reflexes: Reflexes are normal and symmetric.   Psychiatric:         Behavior: Behavior normal.         Thought Content: Thought content normal.         Judgment: Judgment normal.        Assessment:     1. Chronic combined systolic and diastolic heart failure    2. Hypertensive retinopathy, bilateral    3. Chronic obstructive pulmonary disease with acute exacerbation    4. LBBB (left bundle branch block)    5. Biventricular ICD (implantable cardioverter-defibrillator) in place    6. Hypertension associated with diabetes    7. Calcification of aorta    8. Cardiomyopathy, ischemic    9. CKD (chronic kidney disease) stage 4, GFR 15-29 ml/min    10. History of breast cancer    11. 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, with long-term current use of insulin    12. KHOA (obstructive sleep apnea)    13. Type 2 diabetes mellitus with stage 3b chronic kidney disease, with long-term current use of insulin    14. Hyperlipidemia LDL goal <70        Plan:       Consider titrating diovan while reducing hydalazine in collaboration with nephrology  Maintain BP goal < 130/80  Low salt diet  Daily weight   ICD interrogation per protocol   Take an extra dose of Lasix for 3 weight gain.        Continue close follow-up with pulmonology, Nephrology, electrophysiology, primary care      Continue with current medical plan and lifestyle changes.  Return sooner for concerns or questions. If symptoms persist go to the ED  I have reviewed all pertinent data on this patient       I have reviewed the patient's medical history in detail and updated the computerized patient record.    Orders Placed This Encounter   Procedures    CBC Auto Differential     Standing Status:   Future     Standing Expiration Date:   11/15/2024    Comprehensive Metabolic Panel     Standing Status:   Future     Standing Expiration Date:   11/15/2024    Lipid Panel     Standing Status:   Future     Standing Expiration Date:   11/15/2024    Echo     Standing Status:   Future     Standing Expiration Date:   5/15/2024     Order Specific Question:   Release to patient      Answer:   Immediate       Follow up as scheduled. Return sooner for concerns or questions            She expressed verbal understanding and agreed with the plan        -In today's visit, at least 4 established conditions that pose a risk to life or bodily function have been addressed and the conditions are severe.    -In today's visit, monitoring for drug toxicity was accomplished.      Patient's Medications   New Prescriptions    No medications on file   Previous Medications    ACETAMINOPHEN (TYLENOL) 500 MG TABLET    Take 500 mg by mouth as needed.    ALBUTEROL (PROVENTIL/VENTOLIN HFA) 90 MCG/ACTUATION INHALER    Inhale 2 puffs into the lungs every 6 (six) hours as needed for Wheezing. Rescue    ALBUTEROL-IPRATROPIUM (DUO-NEB) 2.5 MG-0.5 MG/3 ML NEBULIZER SOLUTION    TAKE 3 MLS BY NEBULIZATION EVERY 4 (FOUR) HOURS AS NEEDED FOR WHEEZING.    ASPIRIN 81 MG CHEW    Take 81 mg by mouth once daily.    BLOOD SUGAR DIAGNOSTIC (ACCU-CHEK HECTOR) STRP    Uses Accu-Check Hector meter to test BG 4x/day    CHOLECALCIFEROL, VITAMIN D3, (VITAMIN D3) 50 MCG (2,000 UNIT) TAB    Take 1 tablet by mouth once daily.     CYANOCOBALAMIN (VITAMIN B-12) 100 MCG TABLET    Take 100 mcg by mouth once daily.    DULAGLUTIDE (TRULICITY) 1.5 MG/0.5 ML PEN INJECTOR    Inject 1.5 mg into the skin every 7 days.    EPOETIN CHANA-EPBX (RETACRIT) 10,000 UNIT/ML IMJECTION    Inject 1 mL (10,000 Units total) into the skin every 30 days.    ESTRADIOL (ESTRACE) 0.01 % (0.1 MG/GRAM) VAGINAL CREAM    Place 1 g vaginally every other day.    FLUTICASONE PROPIONATE (FLONASE) 50 MCG/ACTUATION NASAL SPRAY    1 spray (50 mcg total) by Each Nostril route 2 (two) times a day.    FUROSEMIDE (LASIX) 40 MG TABLET    Take 1 tablet (40 mg total) by mouth once daily.    HYDROCODONE-ACETAMINOPHEN (NORCO) 5-325 MG PER TABLET    Take 1 tablet by mouth every 4 (four) hours as needed for Pain.    INSULIN (LANTUS SOLOSTAR U-100 INSULIN) GLARGINE 100 UNITS/ML SUBQ PEN    Inject  "10 Units into the skin every evening.    LANCETS (ACCU-CHEK SOFTCLIX LANCETS) MISC    Uses Accu-Chek Angelica meter to test BG 2x/day    MAGNESIUM OXIDE (MAG-OX) 400 MG TABLET    Take 1 tablet (400 mg total) by mouth once daily.    MONTELUKAST (SINGULAIR) 10 MG TABLET    Take 1 tablet (10 mg total) by mouth once daily.    NITROGLYCERIN (NITROSTAT) 0.4 MG SL TABLET    Place 0.4 mg under the tongue every 5 (five) minutes as needed for Chest pain.     OMEGA-3 FATTY ACIDS 500 MG CAP    Take 1 capsule by mouth Daily.    PEN NEEDLE, DIABETIC (BD ULTRA-FINE MINI PEN NEEDLE) 31 GAUGE X 3/16" NDLE    Use with insulin twice a day.    POLYETHYLENE GLYCOL (GLYCOLAX) 17 GRAM PWPK    Take 17 g by mouth daily as needed (constipation).    PRAVASTATIN (PRAVACHOL) 40 MG TABLET    Take 1 tablet (40 mg total) by mouth once daily.    PULSE OXIMETER (PULSE OXIMETER) DEVICE    by Apply Externally route 2 (two) times a day. Use twice daily at 8 AM and 3 PM and record the value in [facility] as directed.    SENNA-DOCUSATE 8.6-50 MG (PERICOLACE) 8.6-50 MG PER TABLET    Take 1 tablet by mouth 2 (two) times daily.    SODIUM BICARBONATE 650 MG TABLET    Take 650 mg by mouth 2 (two) times daily.    VALSARTAN (DIOVAN) 80 MG TABLET    Take 1 tablet (80 mg total) by mouth once daily.   Modified Medications    Modified Medication Previous Medication    CARVEDILOL (COREG) 25 MG TABLET carvediloL (COREG) 25 MG tablet       TAKE 2 TABLETS (50 MG TOTAL) BY MOUTH 2 (TWO) TIMES DAILY.    TAKE 2 TABLETS (50 MG TOTAL) BY MOUTH 2 (TWO) TIMES DAILY.    HYDRALAZINE (APRESOLINE) 100 MG TABLET hydrALAZINE (APRESOLINE) 100 MG tablet       Take 1 tablet (100 mg total) by mouth 2 (two) times daily.    Take 1 tablet (100 mg total) by mouth 2 (two) times daily.    SODIUM BICARBONATE 650 MG TABLET sodium bicarbonate 650 MG tablet       Take 2 tablets (1,300 mg total) by mouth 2 (two) times daily.    Take 2 tablets (1,300 mg total) by mouth 3 (three) times daily. "   Discontinued Medications    No medications on file

## 2023-05-17 ENCOUNTER — CLINICAL SUPPORT (OUTPATIENT)
Dept: OTOLARYNGOLOGY | Facility: CLINIC | Age: 83
End: 2023-05-17
Payer: MEDICARE

## 2023-05-17 DIAGNOSIS — Z46.1 ENCOUNTER FOR FITTING AND ADJUSTMENT OF HEARING AID OF BOTH EARS: Primary | ICD-10-CM

## 2023-05-17 PROCEDURE — 99499 NO LOS: ICD-10-PCS | Mod: ,,, | Performed by: AUDIOLOGIST-HEARING AID FITTER

## 2023-05-17 PROCEDURE — 99499 UNLISTED E&M SERVICE: CPT | Mod: ,,, | Performed by: AUDIOLOGIST-HEARING AID FITTER

## 2023-05-17 NOTE — PROGRESS NOTES
Susannah Carter, CCC-A  Audiologist - [facility] [address] [place] [address]  [email or web address]  [phone]    Patient: Myesha Quinones   MRN: [id number]  [address]  Home Phone [phone]   Work Phone [phone]   Mobile [phone]   : 1940  PELAYO: 2023      HEARING AID FOLLOW-UP      Myesha Quinones is here today with her son, Macho, reporting that the wire for her Left hearing aid broke and came out of her earmold (#[id number] UP).  Sent for remake with Left hearing aid #[id number] for in-warranty repair, citing good fit except for wire coming out, please maintain .  Patient has a backup earmold that she will use with a loaner hearing aid while hers is being repaired.  Discussed and signed loaner paperwork, then programmed #[id number] attached to Left mold #[id number] UP.  Wiped molds and changed out HF3 filters AU.  Noted Right mold #[id number] has weakness in  wire as well.  Recommend we sent that one out once the Left hearing aid returns from repair.  Opened 3/21/23 session and used session audio & settings.  Calibrated Left side only and then saved without any changes to settings.  Forgot previous devices from phone and then re-paired current devices to phone.  Verified worked in office for phone calls only, per patient request.  Myesha Quinones verbalized understanding and satisfaction with today's visit.  She will call if service is needed before her next appointment.    [employer]  #[id number] (R)  #[id number] (L)  Warranty expires: 10/24/2024    _______________________________  Susannah Carter, WINDY-A  Audiologist

## 2023-05-19 ENCOUNTER — LAB VISIT (OUTPATIENT)
Dept: LAB | Facility: HOSPITAL | Age: 83
End: 2023-05-19
Attending: INTERNAL MEDICINE
Payer: MEDICARE

## 2023-05-19 DIAGNOSIS — D63.1 ANEMIA IN STAGE 4 CHRONIC KIDNEY DISEASE: ICD-10-CM

## 2023-05-19 DIAGNOSIS — N25.81 SECONDARY HYPERPARATHYROIDISM OF RENAL ORIGIN: ICD-10-CM

## 2023-05-19 DIAGNOSIS — T85.71XA PERITONEAL DIALYSIS CATHETER EXIT SITE INFECTION, INITIAL ENCOUNTER: ICD-10-CM

## 2023-05-19 DIAGNOSIS — N18.4 ANEMIA IN STAGE 4 CHRONIC KIDNEY DISEASE: ICD-10-CM

## 2023-05-19 DIAGNOSIS — Z79.4 CONTROLLED TYPE 2 DIABETES MELLITUS WITH BOTH EYES AFFECTED BY MILD NONPROLIFERATIVE RETINOPATHY AND MACULAR EDEMA, WITH LONG-TERM CURRENT USE OF INSULIN: ICD-10-CM

## 2023-05-19 DIAGNOSIS — E78.5 HYPERLIPIDEMIA LDL GOAL <70: ICD-10-CM

## 2023-05-19 DIAGNOSIS — E11.3213 CONTROLLED TYPE 2 DIABETES MELLITUS WITH BOTH EYES AFFECTED BY MILD NONPROLIFERATIVE RETINOPATHY AND MACULAR EDEMA, WITH LONG-TERM CURRENT USE OF INSULIN: ICD-10-CM

## 2023-05-19 LAB
ALBUMIN SERPL BCP-MCNC: 3.3 G/DL (ref 3.5–5.2)
ALBUMIN SERPL BCP-MCNC: 3.3 G/DL (ref 3.5–5.2)
ALP SERPL-CCNC: 124 U/L (ref 55–135)
ALT SERPL W/O P-5'-P-CCNC: 14 U/L (ref 10–44)
ANION GAP SERPL CALC-SCNC: 9 MMOL/L (ref 8–16)
ANION GAP SERPL CALC-SCNC: 9 MMOL/L (ref 8–16)
AST SERPL-CCNC: 18 U/L (ref 10–40)
BACTERIA #/AREA URNS HPF: ABNORMAL /HPF
BASOPHILS # BLD AUTO: 0.04 K/UL (ref 0–0.2)
BASOPHILS # BLD AUTO: 0.04 K/UL (ref 0–0.2)
BASOPHILS NFR BLD: 0.5 % (ref 0–1.9)
BASOPHILS NFR BLD: 0.5 % (ref 0–1.9)
BILIRUB SERPL-MCNC: 0.3 MG/DL (ref 0.1–1)
BILIRUB UR QL STRIP: NEGATIVE
BUN SERPL-MCNC: 63 MG/DL (ref 8–23)
BUN SERPL-MCNC: 63 MG/DL (ref 8–23)
CALCIUM SERPL-MCNC: 8.8 MG/DL (ref 8.7–10.5)
CALCIUM SERPL-MCNC: 8.8 MG/DL (ref 8.7–10.5)
CHLORIDE SERPL-SCNC: 98 MMOL/L (ref 95–110)
CHLORIDE SERPL-SCNC: 98 MMOL/L (ref 95–110)
CHOLEST SERPL-MCNC: 129 MG/DL (ref 120–199)
CHOLEST/HDLC SERPL: 2.3 {RATIO} (ref 2–5)
CLARITY UR: CLEAR
CO2 SERPL-SCNC: 26 MMOL/L (ref 23–29)
CO2 SERPL-SCNC: 26 MMOL/L (ref 23–29)
COLOR UR: YELLOW
CREAT SERPL-MCNC: 2.8 MG/DL (ref 0.5–1.4)
CREAT SERPL-MCNC: 2.8 MG/DL (ref 0.5–1.4)
CREAT UR-MCNC: 40.4 MG/DL (ref 15–325)
DIFFERENTIAL METHOD: ABNORMAL
DIFFERENTIAL METHOD: ABNORMAL
EOSINOPHIL # BLD AUTO: 0.5 K/UL (ref 0–0.5)
EOSINOPHIL # BLD AUTO: 0.5 K/UL (ref 0–0.5)
EOSINOPHIL NFR BLD: 6.5 % (ref 0–8)
EOSINOPHIL NFR BLD: 6.5 % (ref 0–8)
ERYTHROCYTE [DISTWIDTH] IN BLOOD BY AUTOMATED COUNT: 13.2 % (ref 11.5–14.5)
ERYTHROCYTE [DISTWIDTH] IN BLOOD BY AUTOMATED COUNT: 13.2 % (ref 11.5–14.5)
EST. GFR  (NO RACE VARIABLE): 16 ML/MIN/1.73 M^2
EST. GFR  (NO RACE VARIABLE): 16 ML/MIN/1.73 M^2
FERRITIN SERPL-MCNC: 639 NG/ML (ref 20–300)
GLUCOSE SERPL-MCNC: 237 MG/DL (ref 70–110)
GLUCOSE SERPL-MCNC: 237 MG/DL (ref 70–110)
GLUCOSE UR QL STRIP: ABNORMAL
HBV SURFACE AB SER-ACNC: <3 MIU/ML
HBV SURFACE AB SER-ACNC: NORMAL M[IU]/ML
HBV SURFACE AG SERPL QL IA: NORMAL
HCT VFR BLD AUTO: 29.5 % (ref 37–48.5)
HCT VFR BLD AUTO: 29.5 % (ref 37–48.5)
HDLC SERPL-MCNC: 56 MG/DL (ref 40–75)
HDLC SERPL: 43.4 % (ref 20–50)
HGB BLD-MCNC: 9.4 G/DL (ref 12–16)
HGB BLD-MCNC: 9.4 G/DL (ref 12–16)
HGB UR QL STRIP: NEGATIVE
HYALINE CASTS #/AREA URNS LPF: 0 /LPF
IMM GRANULOCYTES # BLD AUTO: 0.04 K/UL (ref 0–0.04)
IMM GRANULOCYTES # BLD AUTO: 0.04 K/UL (ref 0–0.04)
IMM GRANULOCYTES NFR BLD AUTO: 0.5 % (ref 0–0.5)
IMM GRANULOCYTES NFR BLD AUTO: 0.5 % (ref 0–0.5)
KETONES UR QL STRIP: NEGATIVE
LDLC SERPL CALC-MCNC: 59.2 MG/DL (ref 63–159)
LEUKOCYTE ESTERASE UR QL STRIP: ABNORMAL
LYMPHOCYTES # BLD AUTO: 1.2 K/UL (ref 1–4.8)
LYMPHOCYTES # BLD AUTO: 1.2 K/UL (ref 1–4.8)
LYMPHOCYTES NFR BLD: 15.9 % (ref 18–48)
LYMPHOCYTES NFR BLD: 15.9 % (ref 18–48)
MAGNESIUM SERPL-MCNC: 2.7 MG/DL (ref 1.6–2.6)
MCH RBC QN AUTO: 31.1 PG (ref 27–31)
MCH RBC QN AUTO: 31.1 PG (ref 27–31)
MCHC RBC AUTO-ENTMCNC: 31.9 G/DL (ref 32–36)
MCHC RBC AUTO-ENTMCNC: 31.9 G/DL (ref 32–36)
MCV RBC AUTO: 98 FL (ref 82–98)
MCV RBC AUTO: 98 FL (ref 82–98)
MICROSCOPIC COMMENT: ABNORMAL
MONOCYTES # BLD AUTO: 0.5 K/UL (ref 0.3–1)
MONOCYTES # BLD AUTO: 0.5 K/UL (ref 0.3–1)
MONOCYTES NFR BLD: 7.2 % (ref 4–15)
MONOCYTES NFR BLD: 7.2 % (ref 4–15)
NEUTROPHILS # BLD AUTO: 5.1 K/UL (ref 1.8–7.7)
NEUTROPHILS # BLD AUTO: 5.1 K/UL (ref 1.8–7.7)
NEUTROPHILS NFR BLD: 69.4 % (ref 38–73)
NEUTROPHILS NFR BLD: 69.4 % (ref 38–73)
NITRITE UR QL STRIP: NEGATIVE
NONHDLC SERPL-MCNC: 73 MG/DL
NRBC BLD-RTO: 0 /100 WBC
NRBC BLD-RTO: 0 /100 WBC
PH UR STRIP: 7 [PH] (ref 5–8)
PHOSPHATE SERPL-MCNC: 4.4 MG/DL (ref 2.7–4.5)
PLATELET # BLD AUTO: 236 K/UL (ref 150–450)
PLATELET # BLD AUTO: 236 K/UL (ref 150–450)
PMV BLD AUTO: 10.1 FL (ref 9.2–12.9)
PMV BLD AUTO: 10.1 FL (ref 9.2–12.9)
POTASSIUM SERPL-SCNC: 4.6 MMOL/L (ref 3.5–5.1)
POTASSIUM SERPL-SCNC: 4.6 MMOL/L (ref 3.5–5.1)
PROT SERPL-MCNC: 7.2 G/DL (ref 6–8.4)
PROT UR QL STRIP: ABNORMAL
PROT UR-MCNC: 204 MG/DL (ref 0–15)
PROT/CREAT UR: 5.05 MG/G{CREAT} (ref 0–0.2)
PTH-INTACT SERPL-MCNC: 359.1 PG/ML (ref 9–77)
RBC # BLD AUTO: 3.02 M/UL (ref 4–5.4)
RBC # BLD AUTO: 3.02 M/UL (ref 4–5.4)
RBC #/AREA URNS HPF: 3 /HPF (ref 0–4)
SODIUM SERPL-SCNC: 133 MMOL/L (ref 136–145)
SODIUM SERPL-SCNC: 133 MMOL/L (ref 136–145)
SP GR UR STRIP: 1.01 (ref 1–1.03)
SQUAMOUS #/AREA URNS HPF: 3 /HPF
TRIGL SERPL-MCNC: 69 MG/DL (ref 30–150)
URATE SERPL-MCNC: 7.3 MG/DL (ref 2.4–5.7)
URN SPEC COLLECT METH UR: ABNORMAL
UROBILINOGEN UR STRIP-ACNC: NEGATIVE EU/DL
WBC # BLD AUTO: 7.35 K/UL (ref 3.9–12.7)
WBC # BLD AUTO: 7.35 K/UL (ref 3.9–12.7)
WBC #/AREA URNS HPF: 26 /HPF (ref 0–5)
YEAST URNS QL MICRO: ABNORMAL

## 2023-05-19 PROCEDURE — 80053 COMPREHEN METABOLIC PANEL: CPT | Performed by: INTERNAL MEDICINE

## 2023-05-19 PROCEDURE — 81000 URINALYSIS NONAUTO W/SCOPE: CPT | Performed by: INTERNAL MEDICINE

## 2023-05-19 PROCEDURE — 82728 ASSAY OF FERRITIN: CPT | Performed by: INTERNAL MEDICINE

## 2023-05-19 PROCEDURE — 80061 LIPID PANEL: CPT | Performed by: INTERNAL MEDICINE

## 2023-05-19 PROCEDURE — 83735 ASSAY OF MAGNESIUM: CPT | Performed by: INTERNAL MEDICINE

## 2023-05-19 PROCEDURE — 85025 COMPLETE CBC W/AUTO DIFF WBC: CPT | Performed by: INTERNAL MEDICINE

## 2023-05-19 PROCEDURE — 36415 COLL VENOUS BLD VENIPUNCTURE: CPT | Performed by: INTERNAL MEDICINE

## 2023-05-19 PROCEDURE — 83970 ASSAY OF PARATHORMONE: CPT | Performed by: INTERNAL MEDICINE

## 2023-05-19 PROCEDURE — 86706 HEP B SURFACE ANTIBODY: CPT | Mod: 91 | Performed by: INTERNAL MEDICINE

## 2023-05-19 PROCEDURE — 84550 ASSAY OF BLOOD/URIC ACID: CPT | Performed by: INTERNAL MEDICINE

## 2023-05-19 PROCEDURE — 87040 BLOOD CULTURE FOR BACTERIA: CPT | Performed by: INTERNAL MEDICINE

## 2023-05-19 PROCEDURE — 84100 ASSAY OF PHOSPHORUS: CPT | Performed by: INTERNAL MEDICINE

## 2023-05-19 PROCEDURE — 84156 ASSAY OF PROTEIN URINE: CPT | Performed by: INTERNAL MEDICINE

## 2023-05-19 PROCEDURE — 87340 HEPATITIS B SURFACE AG IA: CPT | Performed by: INTERNAL MEDICINE

## 2023-05-23 ENCOUNTER — OFFICE VISIT (OUTPATIENT)
Dept: SURGERY | Facility: CLINIC | Age: 83
End: 2023-05-23
Payer: MEDICARE

## 2023-05-23 VITALS
DIASTOLIC BLOOD PRESSURE: 64 MMHG | BODY MASS INDEX: 22.5 KG/M2 | SYSTOLIC BLOOD PRESSURE: 153 MMHG | HEART RATE: 89 BPM | HEIGHT: 63 IN | WEIGHT: 127 LBS

## 2023-05-23 DIAGNOSIS — N18.5 CKD (CHRONIC KIDNEY DISEASE), STAGE V: Primary | ICD-10-CM

## 2023-05-23 PROCEDURE — 99999 PR PBB SHADOW E&M-EST. PATIENT-LVL II: ICD-10-PCS | Mod: PBBFAC,,, | Performed by: STUDENT IN AN ORGANIZED HEALTH CARE EDUCATION/TRAINING PROGRAM

## 2023-05-23 PROCEDURE — 3288F FALL RISK ASSESSMENT DOCD: CPT | Mod: CPTII,S$GLB,, | Performed by: STUDENT IN AN ORGANIZED HEALTH CARE EDUCATION/TRAINING PROGRAM

## 2023-05-23 PROCEDURE — 3078F PR MOST RECENT DIASTOLIC BLOOD PRESSURE < 80 MM HG: ICD-10-PCS | Mod: CPTII,S$GLB,, | Performed by: STUDENT IN AN ORGANIZED HEALTH CARE EDUCATION/TRAINING PROGRAM

## 2023-05-23 PROCEDURE — 99999 PR PBB SHADOW E&M-EST. PATIENT-LVL II: CPT | Mod: PBBFAC,,, | Performed by: STUDENT IN AN ORGANIZED HEALTH CARE EDUCATION/TRAINING PROGRAM

## 2023-05-23 PROCEDURE — 1101F PT FALLS ASSESS-DOCD LE1/YR: CPT | Mod: CPTII,S$GLB,, | Performed by: STUDENT IN AN ORGANIZED HEALTH CARE EDUCATION/TRAINING PROGRAM

## 2023-05-23 PROCEDURE — 99024 PR POST-OP FOLLOW-UP VISIT: ICD-10-PCS | Mod: S$GLB,,, | Performed by: STUDENT IN AN ORGANIZED HEALTH CARE EDUCATION/TRAINING PROGRAM

## 2023-05-23 PROCEDURE — 1101F PR PT FALLS ASSESS DOC 0-1 FALLS W/OUT INJ PAST YR: ICD-10-PCS | Mod: CPTII,S$GLB,, | Performed by: STUDENT IN AN ORGANIZED HEALTH CARE EDUCATION/TRAINING PROGRAM

## 2023-05-23 PROCEDURE — 1125F AMNT PAIN NOTED PAIN PRSNT: CPT | Mod: CPTII,S$GLB,, | Performed by: STUDENT IN AN ORGANIZED HEALTH CARE EDUCATION/TRAINING PROGRAM

## 2023-05-23 PROCEDURE — 1125F PR PAIN SEVERITY QUANTIFIED, PAIN PRESENT: ICD-10-PCS | Mod: CPTII,S$GLB,, | Performed by: STUDENT IN AN ORGANIZED HEALTH CARE EDUCATION/TRAINING PROGRAM

## 2023-05-23 PROCEDURE — 99024 POSTOP FOLLOW-UP VISIT: CPT | Mod: S$GLB,,, | Performed by: STUDENT IN AN ORGANIZED HEALTH CARE EDUCATION/TRAINING PROGRAM

## 2023-05-23 PROCEDURE — 3077F PR MOST RECENT SYSTOLIC BLOOD PRESSURE >= 140 MM HG: ICD-10-PCS | Mod: CPTII,S$GLB,, | Performed by: STUDENT IN AN ORGANIZED HEALTH CARE EDUCATION/TRAINING PROGRAM

## 2023-05-23 PROCEDURE — 3077F SYST BP >= 140 MM HG: CPT | Mod: CPTII,S$GLB,, | Performed by: STUDENT IN AN ORGANIZED HEALTH CARE EDUCATION/TRAINING PROGRAM

## 2023-05-23 PROCEDURE — 3078F DIAST BP <80 MM HG: CPT | Mod: CPTII,S$GLB,, | Performed by: STUDENT IN AN ORGANIZED HEALTH CARE EDUCATION/TRAINING PROGRAM

## 2023-05-23 PROCEDURE — 3288F PR FALLS RISK ASSESSMENT DOCUMENTED: ICD-10-PCS | Mod: CPTII,S$GLB,, | Performed by: STUDENT IN AN ORGANIZED HEALTH CARE EDUCATION/TRAINING PROGRAM

## 2023-05-23 RX ORDER — DOXYCYCLINE HYCLATE 100 MG
100 TABLET ORAL EVERY 12 HOURS
Qty: 20 TABLET | Refills: 0 | Status: SHIPPED | OUTPATIENT
Start: 2023-05-23 | End: 2023-06-02 | Stop reason: SDUPTHER

## 2023-05-24 LAB — BACTERIA BLD CULT: NORMAL

## 2023-05-25 ENCOUNTER — OFFICE VISIT (OUTPATIENT)
Dept: OPHTHALMOLOGY | Facility: CLINIC | Age: 83
End: 2023-05-25
Payer: MEDICARE

## 2023-05-25 DIAGNOSIS — Z79.4 CONTROLLED TYPE 2 DIABETES MELLITUS WITH BOTH EYES AFFECTED BY MILD NONPROLIFERATIVE RETINOPATHY AND MACULAR EDEMA, WITH LONG-TERM CURRENT USE OF INSULIN: Primary | ICD-10-CM

## 2023-05-25 DIAGNOSIS — H35.033 HYPERTENSIVE RETINOPATHY, BILATERAL: ICD-10-CM

## 2023-05-25 DIAGNOSIS — E11.3213 CONTROLLED TYPE 2 DIABETES MELLITUS WITH BOTH EYES AFFECTED BY MILD NONPROLIFERATIVE RETINOPATHY AND MACULAR EDEMA, WITH LONG-TERM CURRENT USE OF INSULIN: Primary | ICD-10-CM

## 2023-05-25 DIAGNOSIS — H35.012 RETINAL MACROANEURYSM OF LEFT EYE: ICD-10-CM

## 2023-05-25 PROCEDURE — 1160F RVW MEDS BY RX/DR IN RCRD: CPT | Mod: CPTII,S$GLB,, | Performed by: OPHTHALMOLOGY

## 2023-05-25 PROCEDURE — 1159F MED LIST DOCD IN RCRD: CPT | Mod: CPTII,S$GLB,, | Performed by: OPHTHALMOLOGY

## 2023-05-25 PROCEDURE — 92134 POSTERIOR SEGMENT OCT RETINA (OCULAR COHERENCE TOMOGRAPHY)-BOTH EYES: ICD-10-PCS | Mod: S$GLB,,, | Performed by: OPHTHALMOLOGY

## 2023-05-25 PROCEDURE — 92014 PR EYE EXAM, EST PATIENT,COMPREHESV: ICD-10-PCS | Mod: S$GLB,,, | Performed by: OPHTHALMOLOGY

## 2023-05-25 PROCEDURE — 99999 PR PBB SHADOW E&M-EST. PATIENT-LVL IV: ICD-10-PCS | Mod: PBBFAC,,, | Performed by: OPHTHALMOLOGY

## 2023-05-25 PROCEDURE — 1101F PT FALLS ASSESS-DOCD LE1/YR: CPT | Mod: CPTII,S$GLB,, | Performed by: OPHTHALMOLOGY

## 2023-05-25 PROCEDURE — 92014 COMPRE OPH EXAM EST PT 1/>: CPT | Mod: S$GLB,,, | Performed by: OPHTHALMOLOGY

## 2023-05-25 PROCEDURE — 3288F FALL RISK ASSESSMENT DOCD: CPT | Mod: CPTII,S$GLB,, | Performed by: OPHTHALMOLOGY

## 2023-05-25 PROCEDURE — 92134 CPTRZ OPH DX IMG PST SGM RTA: CPT | Mod: S$GLB,,, | Performed by: OPHTHALMOLOGY

## 2023-05-25 PROCEDURE — 92201 OPSCPY EXTND RTA DRAW UNI/BI: CPT | Mod: S$GLB,,, | Performed by: OPHTHALMOLOGY

## 2023-05-25 PROCEDURE — 92201 PR OPHTHALMOSCOPY, EXT, W/RET DRAW/SCLERAL DEPR, I&R, UNI/BI: ICD-10-PCS | Mod: S$GLB,,, | Performed by: OPHTHALMOLOGY

## 2023-05-25 PROCEDURE — 1160F PR REVIEW ALL MEDS BY PRESCRIBER/CLIN PHARMACIST DOCUMENTED: ICD-10-PCS | Mod: CPTII,S$GLB,, | Performed by: OPHTHALMOLOGY

## 2023-05-25 PROCEDURE — 1101F PR PT FALLS ASSESS DOC 0-1 FALLS W/OUT INJ PAST YR: ICD-10-PCS | Mod: CPTII,S$GLB,, | Performed by: OPHTHALMOLOGY

## 2023-05-25 PROCEDURE — 99999 PR PBB SHADOW E&M-EST. PATIENT-LVL IV: CPT | Mod: PBBFAC,,, | Performed by: OPHTHALMOLOGY

## 2023-05-25 PROCEDURE — 1126F PR PAIN SEVERITY QUANTIFIED, NO PAIN PRESENT: ICD-10-PCS | Mod: CPTII,S$GLB,, | Performed by: OPHTHALMOLOGY

## 2023-05-25 PROCEDURE — 1159F PR MEDICATION LIST DOCUMENTED IN MEDICAL RECORD: ICD-10-PCS | Mod: CPTII,S$GLB,, | Performed by: OPHTHALMOLOGY

## 2023-05-25 PROCEDURE — 1126F AMNT PAIN NOTED NONE PRSNT: CPT | Mod: CPTII,S$GLB,, | Performed by: OPHTHALMOLOGY

## 2023-05-25 PROCEDURE — 3288F PR FALLS RISK ASSESSMENT DOCUMENTED: ICD-10-PCS | Mod: CPTII,S$GLB,, | Performed by: OPHTHALMOLOGY

## 2023-05-25 RX ORDER — FLUORESCEIN 500 MG/ML
5 INJECTION INTRAVENOUS ONCE
Status: COMPLETED | OUTPATIENT
Start: 2023-05-25 | End: 2023-09-07

## 2023-05-26 NOTE — PROGRESS NOTES
S/p PD catheter placement 4/25  Now with several days of pain, redness, drainage around catheter  Went to [facility], someone gave her topical bacitracin  No fevers  No PO abx  No diffuse ab tenderness  Cultures pending, NGTD on blood cx  Will give trial of doxy for now  RTC next week

## 2023-06-02 ENCOUNTER — HOSPITAL ENCOUNTER (OUTPATIENT)
Dept: RADIOLOGY | Facility: HOSPITAL | Age: 83
Discharge: HOME | End: 2023-06-02
Attending: INTERNAL MEDICINE
Payer: MEDICARE

## 2023-06-02 ENCOUNTER — OFFICE VISIT (OUTPATIENT)
Dept: SURGERY | Facility: CLINIC | Age: 83
End: 2023-06-02
Payer: MEDICARE

## 2023-06-02 VITALS
SYSTOLIC BLOOD PRESSURE: 156 MMHG | BODY MASS INDEX: 22.27 KG/M2 | HEIGHT: 63 IN | DIASTOLIC BLOOD PRESSURE: 66 MMHG | HEART RATE: 83 BPM | WEIGHT: 125.69 LBS

## 2023-06-02 DIAGNOSIS — N18.5 CKD (CHRONIC KIDNEY DISEASE), STAGE V: Primary | ICD-10-CM

## 2023-06-02 DIAGNOSIS — J90 PLEURAL EFFUSION: ICD-10-CM

## 2023-06-02 PROCEDURE — 71046 X-RAY EXAM CHEST 2 VIEWS: CPT | Mod: TC,FY

## 2023-06-02 PROCEDURE — 71046 XR CHEST PA AND LATERAL: ICD-10-PCS | Mod: 26,,, | Performed by: RADIOLOGY

## 2023-06-02 PROCEDURE — 3077F PR MOST RECENT SYSTOLIC BLOOD PRESSURE >= 140 MM HG: ICD-10-PCS | Mod: CPTII,S$GLB,, | Performed by: STUDENT IN AN ORGANIZED HEALTH CARE EDUCATION/TRAINING PROGRAM

## 2023-06-02 PROCEDURE — 99024 PR POST-OP FOLLOW-UP VISIT: ICD-10-PCS | Mod: S$GLB,,, | Performed by: STUDENT IN AN ORGANIZED HEALTH CARE EDUCATION/TRAINING PROGRAM

## 2023-06-02 PROCEDURE — 1159F MED LIST DOCD IN RCRD: CPT | Mod: CPTII,S$GLB,, | Performed by: STUDENT IN AN ORGANIZED HEALTH CARE EDUCATION/TRAINING PROGRAM

## 2023-06-02 PROCEDURE — 1160F RVW MEDS BY RX/DR IN RCRD: CPT | Mod: CPTII,S$GLB,, | Performed by: STUDENT IN AN ORGANIZED HEALTH CARE EDUCATION/TRAINING PROGRAM

## 2023-06-02 PROCEDURE — 99999 PR PBB SHADOW E&M-EST. PATIENT-LVL IV: CPT | Mod: PBBFAC,,, | Performed by: STUDENT IN AN ORGANIZED HEALTH CARE EDUCATION/TRAINING PROGRAM

## 2023-06-02 PROCEDURE — 99999 PR PBB SHADOW E&M-EST. PATIENT-LVL IV: ICD-10-PCS | Mod: PBBFAC,,, | Performed by: STUDENT IN AN ORGANIZED HEALTH CARE EDUCATION/TRAINING PROGRAM

## 2023-06-02 PROCEDURE — 1126F PR PAIN SEVERITY QUANTIFIED, NO PAIN PRESENT: ICD-10-PCS | Mod: CPTII,S$GLB,, | Performed by: STUDENT IN AN ORGANIZED HEALTH CARE EDUCATION/TRAINING PROGRAM

## 2023-06-02 PROCEDURE — 3288F PR FALLS RISK ASSESSMENT DOCUMENTED: ICD-10-PCS | Mod: CPTII,S$GLB,, | Performed by: STUDENT IN AN ORGANIZED HEALTH CARE EDUCATION/TRAINING PROGRAM

## 2023-06-02 PROCEDURE — 1101F PT FALLS ASSESS-DOCD LE1/YR: CPT | Mod: CPTII,S$GLB,, | Performed by: STUDENT IN AN ORGANIZED HEALTH CARE EDUCATION/TRAINING PROGRAM

## 2023-06-02 PROCEDURE — 3288F FALL RISK ASSESSMENT DOCD: CPT | Mod: CPTII,S$GLB,, | Performed by: STUDENT IN AN ORGANIZED HEALTH CARE EDUCATION/TRAINING PROGRAM

## 2023-06-02 PROCEDURE — 99024 POSTOP FOLLOW-UP VISIT: CPT | Mod: S$GLB,,, | Performed by: STUDENT IN AN ORGANIZED HEALTH CARE EDUCATION/TRAINING PROGRAM

## 2023-06-02 PROCEDURE — 3078F PR MOST RECENT DIASTOLIC BLOOD PRESSURE < 80 MM HG: ICD-10-PCS | Mod: CPTII,S$GLB,, | Performed by: STUDENT IN AN ORGANIZED HEALTH CARE EDUCATION/TRAINING PROGRAM

## 2023-06-02 PROCEDURE — 3078F DIAST BP <80 MM HG: CPT | Mod: CPTII,S$GLB,, | Performed by: STUDENT IN AN ORGANIZED HEALTH CARE EDUCATION/TRAINING PROGRAM

## 2023-06-02 PROCEDURE — 1160F PR REVIEW ALL MEDS BY PRESCRIBER/CLIN PHARMACIST DOCUMENTED: ICD-10-PCS | Mod: CPTII,S$GLB,, | Performed by: STUDENT IN AN ORGANIZED HEALTH CARE EDUCATION/TRAINING PROGRAM

## 2023-06-02 PROCEDURE — 1126F AMNT PAIN NOTED NONE PRSNT: CPT | Mod: CPTII,S$GLB,, | Performed by: STUDENT IN AN ORGANIZED HEALTH CARE EDUCATION/TRAINING PROGRAM

## 2023-06-02 PROCEDURE — 3077F SYST BP >= 140 MM HG: CPT | Mod: CPTII,S$GLB,, | Performed by: STUDENT IN AN ORGANIZED HEALTH CARE EDUCATION/TRAINING PROGRAM

## 2023-06-02 PROCEDURE — 71046 X-RAY EXAM CHEST 2 VIEWS: CPT | Mod: 26,,, | Performed by: RADIOLOGY

## 2023-06-02 PROCEDURE — 1101F PR PT FALLS ASSESS DOC 0-1 FALLS W/OUT INJ PAST YR: ICD-10-PCS | Mod: CPTII,S$GLB,, | Performed by: STUDENT IN AN ORGANIZED HEALTH CARE EDUCATION/TRAINING PROGRAM

## 2023-06-02 PROCEDURE — 1159F PR MEDICATION LIST DOCUMENTED IN MEDICAL RECORD: ICD-10-PCS | Mod: CPTII,S$GLB,, | Performed by: STUDENT IN AN ORGANIZED HEALTH CARE EDUCATION/TRAINING PROGRAM

## 2023-06-02 RX ORDER — DOXYCYCLINE HYCLATE 100 MG
100 TABLET ORAL EVERY 12 HOURS
Qty: 28 TABLET | Refills: 0 | Status: SHIPPED | OUTPATIENT
Start: 2023-06-02 | End: 2023-06-16

## 2023-06-02 NOTE — PROGRESS NOTES
Ms. Quinones is a patient of Dr. Mckeon and was last seen in clinic 5/11/2022.      Subjective:   Patient ID:  Myesha Quinones is an 83 y.o. female who presents for follow up of CRT-D  .     HPI:    Ms. Quinones is an 83 y.o. female with NICM, HTN, LBBB, CAD, CRT-D, CKD IV, DM, sleep apnea here for follow up.     Background:     CRT-D implanted 2007 >> EF has normalized.   Has done well for extended period.   12/17/12 had an episode of VT >> ATP unsuccessful, shock resulted in Type II break. She had presyncope >> then had shock.  We elected to defer addition of anti-arrhythmic therapy, as this was first shock, EF was normal.  Generator change 3/2013 and 12/17/2018.  Had COVID 12/20. Her  had it as well, and passed away.  Echo 7/6/20 EF 50% PASP 60 mm Hg.    Update 5/11/2022:  Admitted 2/22 with acute decompensated HF. Initially managed in HF transition clinic. Now with Dr. Salgado.  Has been doing better overall in regard to shortness of breath.  Echo 2/22/22 EF 45-50% normal RV size and function  Device interrogation reveals stable function of leads, RA pacing 0% biventricular pacing > 99% no significant arrhythmias.  S/p CRT, she has been a responder.    Doing well from an arrhythmia standpoint  Continue current settings and medications.  F/u with monitoring as scheduled, with me in one year    Update (06/07/2023):    Today she says she is feeling well. No new cardiac complaints. Ms. Quinones reports no chest pain with exertion or at rest, palpitations, SOB, PELAYO, dizziness, or syncope.    On GDMT.    Device Interrogation (6/7/2023) reveals an intrinsic SR with CHB with stable lead and device function. PVAB changed from partial to partial+ due to R wave oversensing on presenting. No arrhythmias or treated episodes were noted.  She paces 1% in the RA and 99.6% in the BiV. Estimated battery longevity 2 years.     I have personally reviewed the patient's EKG today, which shows ASVP at 77bpm. CT interval is 156.  QRS is 126. QTc is 520.    Relevant Cardiac Test Results:    2D Echo (6/21/2022):  The left ventricle is normal in size with concentric hypertrophy and normal systolic function.  The estimated ejection fraction is 55%.  Grade II left ventricular diastolic dysfunction.  Normal right ventricular size with normal right ventricular systolic function.  Severe left atrial enlargement.  Mild mitral regurgitation.  Mild to moderate tricuspid regurgitation.  Intermediate central venous pressure (8 mmHg).  The estimated PA systolic pressure is 56 mmHg.  There is pulmonary hypertension.  There is a left pleural effusion.    Current Outpatient Medications   Medication Sig    acetaminophen (TYLENOL) 500 MG tablet Take 500 mg by mouth as needed.    albuterol (PROVENTIL/VENTOLIN HFA) 90 mcg/actuation inhaler Inhale 2 puffs into the lungs every 6 (six) hours as needed for Wheezing. Rescue    albuterol-ipratropium (DUO-NEB) 2.5 mg-0.5 mg/3 mL nebulizer solution TAKE 3 MLS BY NEBULIZATION EVERY 4 (FOUR) HOURS AS NEEDED FOR WHEEZING.    aspirin 81 MG Chew Take 81 mg by mouth once daily.    blood sugar diagnostic (ACCU-CHEK HECTOR) Strp Uses Accu-Check Hector meter to test BG 4x/day    carvediloL (COREG) 25 MG tablet TAKE 2 TABLETS (50 MG TOTAL) BY MOUTH 2 (TWO) TIMES DAILY.    cholecalciferol, vitamin D3, (VITAMIN D3) 50 mcg (2,000 unit) Tab Take 1 tablet by mouth once daily.     cyanocobalamin (VITAMIN B-12) 100 MCG tablet Take 100 mcg by mouth once daily.    doxycycline (VIBRA-TABS) 100 MG tablet Take 1 tablet (100 mg total) by mouth every 12 (twelve) hours. for 14 days    dulaglutide (TRULICITY) 1.5 mg/0.5 mL pen injector Inject 1.5 mg into the skin every 7 days.    epoetin [place]-epbx (RETACRIT) 10,000 unit/mL imjection Inject 1 mL (10,000 Units total) into the skin every 30 days.    estradioL (ESTRACE) 0.01 % (0.1 mg/gram) vaginal cream Place 1 g vaginally every other day.    fluticasone propionate (FLONASE) 50 mcg/actuation nasal  "spray 1 spray (50 mcg total) by Each Nostril route 2 (two) times a day.    furosemide (LASIX) 40 MG tablet Take 1 tablet (40 mg total) by mouth once daily.    hydrALAZINE (APRESOLINE) 100 MG tablet Take 1 tablet (100 mg total) by mouth 2 (two) times daily.    HYDROcodone-acetaminophen (NORCO) 5-325 mg per tablet Take 1 tablet by mouth every 4 (four) hours as needed for Pain.    insulin (LANTUS SOLOSTAR U-100 INSULIN) glargine 100 units/mL SubQ pen Inject 10 Units into the skin every evening.    lancets (ACCU-CHEK SOFTCLIX LANCETS) Misc Uses Accu-Chek Angelica meter to test BG 2x/day    montelukast (SINGULAIR) 10 mg tablet Take 1 tablet (10 mg total) by mouth once daily.    nitroGLYCERIN (NITROSTAT) 0.4 MG SL tablet Place 0.4 mg under the tongue every 5 (five) minutes as needed for Chest pain.     omega-3 fatty acids 500 mg Cap Take 1 capsule by mouth Daily.    pen needle, diabetic (BD ULTRA-FINE MINI PEN NEEDLE) 31 gauge x 3/16" Ndle Use with insulin twice a day.    polyethylene glycol (GLYCOLAX) 17 gram PwPk Take 17 g by mouth daily as needed (constipation).    pravastatin (PRAVACHOL) 40 MG tablet Take 1 tablet (40 mg total) by mouth once daily.    pulse oximeter (PULSE OXIMETER) device by Apply Externally route 2 (two) times a day. Use twice daily at 8 AM and 3 PM and record the value in [facility] as directed.    senna-docusate 8.6-50 mg (PERICOLACE) 8.6-50 mg per tablet Take 1 tablet by mouth 2 (two) times daily.    sodium bicarbonate 650 MG tablet Take 2 tablets (1,300 mg total) by mouth 2 (two) times daily.    valsartan (DIOVAN) 80 MG tablet Take 1 tablet (80 mg total) by mouth once daily.     Current Facility-Administered Medications   Medication    fluorescein 500 mg/5 mL (10 %) injection 500 mg       Review of Systems   Constitutional: Negative for malaise/fatigue.   Cardiovascular:  Negative for chest pain, dyspnea on exertion, irregular heartbeat, leg swelling and palpitations.   Respiratory:  Negative for " "shortness of breath.    Hematologic/Lymphatic: Negative for bleeding problem.   Skin:  Negative for rash.   Musculoskeletal:  Negative for myalgias.   Gastrointestinal:  Negative for hematemesis, hematochezia and nausea.   Genitourinary:  Negative for hematuria.   Neurological:  Negative for light-headedness.   Psychiatric/Behavioral:  Negative for altered mental status.    Allergic/Immunologic: Negative for persistent infections.     Objective:          /63   Pulse 77   Ht 5' 3" (1.6 m)   Wt 56.8 kg (125 lb 3.5 oz)   LMP  (LMP Unknown)   BMI 22.18 kg/m²     Physical Exam  Vitals and nursing note reviewed.   Constitutional:       Appearance: Normal appearance. She is well-developed.   HENT:      Head: Normocephalic.      Nose: Nose normal.   Eyes:      Pupils: Pupils are equal, round, and reactive to light.   Cardiovascular:      Rate and Rhythm: Normal rate and regular rhythm.   Pulmonary:      Effort: No respiratory distress.      Breath sounds: Normal breath sounds.   Chest:      Comments: Device to LUCW. Incision and pocket in good repair.    Musculoskeletal:         General: Normal range of motion.   Skin:     General: Skin is warm and dry.      Findings: No erythema.   Neurological:      Mental Status: She is alert and oriented to person, place, and time.   Psychiatric:         Speech: Speech normal.         Behavior: Behavior normal.         Lab Results   Component Value Date     (L) 05/19/2023     (L) 05/19/2023    K 4.6 05/19/2023    K 4.6 05/19/2023    MG 2.7 (H) 05/19/2023    BUN 63 (H) 05/19/2023    BUN 63 (H) 05/19/2023    CREATININE 2.8 (H) 05/19/2023    CREATININE 2.8 (H) 05/19/2023    ALT 14 05/19/2023    AST 18 05/19/2023    HGB 9.4 (L) 05/19/2023    HGB 9.4 (L) 05/19/2023    HCT 29.5 (L) 05/19/2023    HCT 29.5 (L) 05/19/2023    HCT 40 04/03/2022    TSH 0.422 07/08/2022    LDLCALC 59.2 (L) 05/19/2023       Recent Labs   Lab 12/07/20  1424 06/21/22  0233 08/31/22  0746   INR " 1.0 1.1 1.0       Assessment:     1. Biventricular ICD (implantable cardioverter-defibrillator) in place    2. Chronic combined systolic and diastolic heart failure    3. Essential hypertension    4. LBBB (left bundle branch block)    5. CKD (chronic kidney disease), stage IV    6. KHOA (obstructive sleep apnea)      Plan:     In summary, Ms. Quinones is an 83 y.o. female with NICM, HTN, LBBB, CAD, CRT-D, CKD IV, DM, sleep apnea here for follow up.   Ms. Quinones is doing well from a device perspective with stable lead and device function. No arrhythmia noted. 99% biventricular pacing and narrow QRS. No CHF symptoms. Echo 6/2022 showed EF 60%. Follows with nephrology for CKD IV.    Continue current medication regimen and device settings.   Follow up in device clinic as scheduled.   Follow up in EP clinic in 1 year, sooner as needed.     *A copy of this note has been sent to Dr. Mckeon*    Follow up in about 1 year (around 6/7/2024).    ------------------------------------------------------------------    TRICIA Elise, NP-C  Cardiac Electrophysiology

## 2023-06-02 NOTE — PROGRESS NOTES
Feeling better  No pain now  Minimal erythema, around 5mm around skin exit  No apparent drainage today  nontender  Will refill doxy  Eva flores Tuesday  PLan to start using in two weeks?

## 2023-06-06 ENCOUNTER — TELEPHONE (OUTPATIENT)
Dept: PULMONOLOGY | Facility: CLINIC | Age: 83
End: 2023-06-06
Payer: MEDICARE

## 2023-06-06 NOTE — TELEPHONE ENCOUNTER
Call returned to patients daughter she will see the NP in [place] until Dr. Maki has a opening.

## 2023-06-06 NOTE — TELEPHONE ENCOUNTER
----- Message from Dom Cesar sent at 6/6/2023  4:08 PM CDT -----  Regarding: appt  Contact: [phone] Betsy/Daughter  Betsy/Daughter is calling bc pt has been referred to make an appt. Pt would provider MD and also a female provider. Please call

## 2023-06-07 ENCOUNTER — CLINICAL SUPPORT (OUTPATIENT)
Dept: CARDIOLOGY | Facility: HOSPITAL | Age: 83
End: 2023-06-07
Attending: INTERNAL MEDICINE
Payer: MEDICARE

## 2023-06-07 ENCOUNTER — OFFICE VISIT (OUTPATIENT)
Dept: ELECTROPHYSIOLOGY | Facility: CLINIC | Age: 83
End: 2023-06-07
Payer: MEDICARE

## 2023-06-07 ENCOUNTER — HOSPITAL ENCOUNTER (OUTPATIENT)
Dept: CARDIOLOGY | Facility: CLINIC | Age: 83
Discharge: HOME | End: 2023-06-07
Attending: INTERNAL MEDICINE
Payer: MEDICARE

## 2023-06-07 VITALS
HEART RATE: 77 BPM | WEIGHT: 125.25 LBS | HEIGHT: 63 IN | DIASTOLIC BLOOD PRESSURE: 63 MMHG | BODY MASS INDEX: 22.19 KG/M2 | SYSTOLIC BLOOD PRESSURE: 129 MMHG

## 2023-06-07 DIAGNOSIS — Z95.810 BIVENTRICULAR ICD (IMPLANTABLE CARDIOVERTER-DEFIBRILLATOR) IN PLACE: Primary | ICD-10-CM

## 2023-06-07 DIAGNOSIS — Z95.810 BIVENTRICULAR ICD (IMPLANTABLE CARDIOVERTER-DEFIBRILLATOR) IN PLACE: ICD-10-CM

## 2023-06-07 DIAGNOSIS — I44.7 LBBB (LEFT BUNDLE BRANCH BLOCK): ICD-10-CM

## 2023-06-07 DIAGNOSIS — N18.4 CKD (CHRONIC KIDNEY DISEASE), STAGE IV: ICD-10-CM

## 2023-06-07 DIAGNOSIS — I44.2 COMPLETE HEART BLOCK: ICD-10-CM

## 2023-06-07 DIAGNOSIS — I50.43 ACUTE ON CHRONIC COMBINED SYSTOLIC AND DIASTOLIC HEART FAILURE: ICD-10-CM

## 2023-06-07 DIAGNOSIS — I10 ESSENTIAL HYPERTENSION: ICD-10-CM

## 2023-06-07 DIAGNOSIS — I42.8 NONISCHEMIC CARDIOMYOPATHY: ICD-10-CM

## 2023-06-07 DIAGNOSIS — I50.42 CHRONIC COMBINED SYSTOLIC AND DIASTOLIC HEART FAILURE: ICD-10-CM

## 2023-06-07 DIAGNOSIS — G47.33 OSA (OBSTRUCTIVE SLEEP APNEA): ICD-10-CM

## 2023-06-07 PROCEDURE — 99214 OFFICE O/P EST MOD 30 MIN: CPT | Mod: S$GLB,,, | Performed by: NURSE PRACTITIONER

## 2023-06-07 PROCEDURE — 93005 RHYTHM STRIP: ICD-10-PCS | Mod: S$GLB,,, | Performed by: INTERNAL MEDICINE

## 2023-06-07 PROCEDURE — 93284 PRGRMG EVAL IMPLANTABLE DFB: CPT

## 2023-06-07 PROCEDURE — 3288F FALL RISK ASSESSMENT DOCD: CPT | Mod: CPTII,S$GLB,, | Performed by: NURSE PRACTITIONER

## 2023-06-07 PROCEDURE — 1159F MED LIST DOCD IN RCRD: CPT | Mod: CPTII,S$GLB,, | Performed by: NURSE PRACTITIONER

## 2023-06-07 PROCEDURE — 93010 ELECTROCARDIOGRAM REPORT: CPT | Mod: S$GLB,,, | Performed by: INTERNAL MEDICINE

## 2023-06-07 PROCEDURE — 3074F PR MOST RECENT SYSTOLIC BLOOD PRESSURE < 130 MM HG: ICD-10-PCS | Mod: CPTII,S$GLB,, | Performed by: NURSE PRACTITIONER

## 2023-06-07 PROCEDURE — 99999 PR PBB SHADOW E&M-EST. PATIENT-LVL III: ICD-10-PCS | Mod: PBBFAC,,, | Performed by: NURSE PRACTITIONER

## 2023-06-07 PROCEDURE — 93010 RHYTHM STRIP: ICD-10-PCS | Mod: S$GLB,,, | Performed by: INTERNAL MEDICINE

## 2023-06-07 PROCEDURE — 3078F PR MOST RECENT DIASTOLIC BLOOD PRESSURE < 80 MM HG: ICD-10-PCS | Mod: CPTII,S$GLB,, | Performed by: NURSE PRACTITIONER

## 2023-06-07 PROCEDURE — 93284 CARDIAC DEVICE CHECK - IN CLINIC & HOSPITAL: ICD-10-PCS | Mod: 26,,, | Performed by: INTERNAL MEDICINE

## 2023-06-07 PROCEDURE — 93005 ELECTROCARDIOGRAM TRACING: CPT | Mod: S$GLB,,, | Performed by: INTERNAL MEDICINE

## 2023-06-07 PROCEDURE — 1160F RVW MEDS BY RX/DR IN RCRD: CPT | Mod: CPTII,S$GLB,, | Performed by: NURSE PRACTITIONER

## 2023-06-07 PROCEDURE — 99999 PR PBB SHADOW E&M-EST. PATIENT-LVL III: CPT | Mod: PBBFAC,,, | Performed by: NURSE PRACTITIONER

## 2023-06-07 PROCEDURE — 1126F AMNT PAIN NOTED NONE PRSNT: CPT | Mod: CPTII,S$GLB,, | Performed by: NURSE PRACTITIONER

## 2023-06-07 PROCEDURE — 99214 PR OFFICE/OUTPT VISIT, EST, LEVL IV, 30-39 MIN: ICD-10-PCS | Mod: S$GLB,,, | Performed by: NURSE PRACTITIONER

## 2023-06-07 PROCEDURE — 1101F PR PT FALLS ASSESS DOC 0-1 FALLS W/OUT INJ PAST YR: ICD-10-PCS | Mod: CPTII,S$GLB,, | Performed by: NURSE PRACTITIONER

## 2023-06-07 PROCEDURE — 1126F PR PAIN SEVERITY QUANTIFIED, NO PAIN PRESENT: ICD-10-PCS | Mod: CPTII,S$GLB,, | Performed by: NURSE PRACTITIONER

## 2023-06-07 PROCEDURE — 3078F DIAST BP <80 MM HG: CPT | Mod: CPTII,S$GLB,, | Performed by: NURSE PRACTITIONER

## 2023-06-07 PROCEDURE — 3074F SYST BP LT 130 MM HG: CPT | Mod: CPTII,S$GLB,, | Performed by: NURSE PRACTITIONER

## 2023-06-07 PROCEDURE — 1101F PT FALLS ASSESS-DOCD LE1/YR: CPT | Mod: CPTII,S$GLB,, | Performed by: NURSE PRACTITIONER

## 2023-06-07 PROCEDURE — 1160F PR REVIEW ALL MEDS BY PRESCRIBER/CLIN PHARMACIST DOCUMENTED: ICD-10-PCS | Mod: CPTII,S$GLB,, | Performed by: NURSE PRACTITIONER

## 2023-06-07 PROCEDURE — 3288F PR FALLS RISK ASSESSMENT DOCUMENTED: ICD-10-PCS | Mod: CPTII,S$GLB,, | Performed by: NURSE PRACTITIONER

## 2023-06-07 PROCEDURE — 1159F PR MEDICATION LIST DOCUMENTED IN MEDICAL RECORD: ICD-10-PCS | Mod: CPTII,S$GLB,, | Performed by: NURSE PRACTITIONER

## 2023-06-07 PROCEDURE — 93284 PRGRMG EVAL IMPLANTABLE DFB: CPT | Mod: 26,,, | Performed by: INTERNAL MEDICINE

## 2023-06-09 RX ORDER — VALSARTAN 80 MG/1
TABLET ORAL
Qty: 180 TABLET | Refills: 3 | Status: SHIPPED | OUTPATIENT
Start: 2023-06-09 | End: 2023-12-04 | Stop reason: SDUPTHER

## 2023-06-13 ENCOUNTER — PATIENT MESSAGE (OUTPATIENT)
Dept: OTOLARYNGOLOGY | Facility: CLINIC | Age: 83
End: 2023-06-13
Payer: MEDICARE

## 2023-06-14 ENCOUNTER — OFFICE VISIT (OUTPATIENT)
Dept: UROGYNECOLOGY | Facility: CLINIC | Age: 83
End: 2023-06-14
Payer: MEDICARE

## 2023-06-14 VITALS
HEIGHT: 63 IN | SYSTOLIC BLOOD PRESSURE: 157 MMHG | WEIGHT: 121.94 LBS | DIASTOLIC BLOOD PRESSURE: 67 MMHG | HEART RATE: 77 BPM | BODY MASS INDEX: 21.61 KG/M2

## 2023-06-14 DIAGNOSIS — R33.9 URINARY RETENTION: Primary | ICD-10-CM

## 2023-06-14 DIAGNOSIS — Z46.89 ENCOUNTER FOR FITTING AND ADJUSTMENT OF PESSARY: ICD-10-CM

## 2023-06-14 PROCEDURE — 1126F AMNT PAIN NOTED NONE PRSNT: CPT | Mod: CPTII,S$GLB,, | Performed by: PHYSICIAN ASSISTANT

## 2023-06-14 PROCEDURE — 3288F FALL RISK ASSESSMENT DOCD: CPT | Mod: CPTII,S$GLB,, | Performed by: PHYSICIAN ASSISTANT

## 2023-06-14 PROCEDURE — 99999 PR PBB SHADOW E&M-EST. PATIENT-LVL III: ICD-10-PCS | Mod: PBBFAC,,, | Performed by: PHYSICIAN ASSISTANT

## 2023-06-14 PROCEDURE — 87086 URINE CULTURE/COLONY COUNT: CPT | Performed by: PHYSICIAN ASSISTANT

## 2023-06-14 PROCEDURE — 3288F PR FALLS RISK ASSESSMENT DOCUMENTED: ICD-10-PCS | Mod: CPTII,S$GLB,, | Performed by: PHYSICIAN ASSISTANT

## 2023-06-14 PROCEDURE — 99213 OFFICE O/P EST LOW 20 MIN: CPT | Mod: 25,S$GLB,, | Performed by: PHYSICIAN ASSISTANT

## 2023-06-14 PROCEDURE — 51701 INSERT BLADDER CATHETER: CPT | Mod: S$GLB,,, | Performed by: PHYSICIAN ASSISTANT

## 2023-06-14 PROCEDURE — 1159F PR MEDICATION LIST DOCUMENTED IN MEDICAL RECORD: ICD-10-PCS | Mod: CPTII,S$GLB,, | Performed by: PHYSICIAN ASSISTANT

## 2023-06-14 PROCEDURE — 1159F MED LIST DOCD IN RCRD: CPT | Mod: CPTII,S$GLB,, | Performed by: PHYSICIAN ASSISTANT

## 2023-06-14 PROCEDURE — 99999 PR PBB SHADOW E&M-EST. PATIENT-LVL III: CPT | Mod: PBBFAC,,, | Performed by: PHYSICIAN ASSISTANT

## 2023-06-14 PROCEDURE — 1126F PR PAIN SEVERITY QUANTIFIED, NO PAIN PRESENT: ICD-10-PCS | Mod: CPTII,S$GLB,, | Performed by: PHYSICIAN ASSISTANT

## 2023-06-14 PROCEDURE — 3077F SYST BP >= 140 MM HG: CPT | Mod: CPTII,S$GLB,, | Performed by: PHYSICIAN ASSISTANT

## 2023-06-14 PROCEDURE — 3078F DIAST BP <80 MM HG: CPT | Mod: CPTII,S$GLB,, | Performed by: PHYSICIAN ASSISTANT

## 2023-06-14 PROCEDURE — 99213 PR OFFICE/OUTPT VISIT, EST, LEVL III, 20-29 MIN: ICD-10-PCS | Mod: 25,S$GLB,, | Performed by: PHYSICIAN ASSISTANT

## 2023-06-14 PROCEDURE — 3078F PR MOST RECENT DIASTOLIC BLOOD PRESSURE < 80 MM HG: ICD-10-PCS | Mod: CPTII,S$GLB,, | Performed by: PHYSICIAN ASSISTANT

## 2023-06-14 PROCEDURE — 3077F PR MOST RECENT SYSTOLIC BLOOD PRESSURE >= 140 MM HG: ICD-10-PCS | Mod: CPTII,S$GLB,, | Performed by: PHYSICIAN ASSISTANT

## 2023-06-14 PROCEDURE — 1101F PT FALLS ASSESS-DOCD LE1/YR: CPT | Mod: CPTII,S$GLB,, | Performed by: PHYSICIAN ASSISTANT

## 2023-06-14 PROCEDURE — 51701 PR INSERTION OF NON-INDWELLING BLADDER CATHETERIZATION FOR RESIDUAL UR: ICD-10-PCS | Mod: S$GLB,,, | Performed by: PHYSICIAN ASSISTANT

## 2023-06-14 PROCEDURE — 1101F PR PT FALLS ASSESS DOC 0-1 FALLS W/OUT INJ PAST YR: ICD-10-PCS | Mod: CPTII,S$GLB,, | Performed by: PHYSICIAN ASSISTANT

## 2023-06-14 NOTE — PROGRESS NOTES
[facility] - UROGYNECOLOGY  [address] [id number]  2023     Myesha Quinones  [id number]  1940    UROGYN FOLLOW-UP  2023     83 y.o. female,   presents for urogyn follow up. She c/o   Chief Complaint   Patient presents with    Follow-up     4 month     .     HPI from 2022:  Patient pelvic organ prolapse referred for conservative therapy with pessary patient with indwelling De La Cruz catheter.  Soft that the pessary will assist in patient in decompressing bladder.     2022  Here for pessary fitting to help improve incomplete bladder emptying due to prolapse     2022:  Patient doing well, using her vaginal estrogen cream about once weekly. Her pessary has been staying in place and comfortable, feels like she is emptying her bladder well and leaking is resolved. Denies UTI symptoms and constipation     2023:  Patient doing well, using her vaginal estrogen cream about once weekly. Her pessary has been staying in place and comfortable, feels like she is emptying her bladder well and leaking is resolved. Denies UTI symptoms and constipation. Has some urinary urgency, but never incontinence. Wears small pantyliners without leakage.     Changes from last visit:  Patient has no complaints. Using vaginal estrogen cream 2 nights per week.  Denies urinary leakage and constipation and UTI symptoms. She is about to start dialysis.    Past Medical History:   Diagnosis Date    Acute hypoxemic respiratory failure 2019    Acute right-sided thoracic back pain 2019    Allergy     Asthma     Basal cell carcinoma     left forehead    Basal cell carcinoma     left nose    Basal cell carcinoma 2015    right nose    Basal cell carcinoma 2015    left lower post neck    Basal cell carcinoma 2019    left ant scalp     Basal cell carcinoma of right ala nasi 10/20/2022    Bilateral pleural effusion     Bilateral renal cysts     Breast cancer      CAD (coronary artery disease)     Cardiomyopathy     Cardiomyopathy, ischemic     Cataract     CHF (congestive heart failure)     CKD (chronic kidney disease) stage 4, GFR 15-29 ml/min     Colon polyp 2011    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, with long-term current use of insulin 02/22/2018    COPD (chronic obstructive pulmonary disease)     COPD exacerbation 04/08/2018    Current mild episode of major depressive disorder without prior episode 01/25/2022    Defibrillator discharge     Diabetes mellitus     Diabetes mellitus type II     Diabetes with neurologic complications     Edema     ESRD needing dialysis     Goiter     MNG    Hematuria, unspecified     HX: breast cancer     Hyperlipidemia     Hypertension     Hypocalcemia     Hypokalemia     Hyponatremia     Hyponatremia 04/02/2022    Iron deficiency anemia 05/16/2017    Iron deficiency anemia     Iron deficiency anemia     Left kidney mass     Meningioma     Microalbuminuria due to type 2 diabetes mellitus 01/26/2022    Osteoporosis, postmenopausal     Pneumonia 12/08/2019    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    Proteinuria 01/21/2019    Pseudomonas pneumonia     Skin cancer     s/p excision    Sleep apnea     CPAP    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/2015    mid forehead    Unspecified vitamin D deficiency     UTI (urinary tract infection) 04/02/2022    Ventricular tachycardia     Vitamin B12 deficiency     Vitamin D deficiency disease        Past Surgical History:   Procedure Laterality Date    BASAL CELL CARCINOMA EXCISION      posterior neck and nose    BREAST BIOPSY      BREAST CYST EXCISION Left     BREAST SURGERY      CARDIAC DEFIBRILLATOR PLACEMENT      x 2    CATARACT EXTRACTION W/  INTRAOCULAR LENS IMPLANT Bilateral     CHOLECYSTECTOMY      COLONOSCOPY N/A 11/5/2019    Procedure: COLONOSCOPY;  Surgeon: Boaz Botello MD;  Location: [facility] (4TH FLR);   Service: Endoscopy;  Laterality: N/A;  AICD - [employer] -     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    FRACTURE SURGERY      left elbow and wrist as a child    HYSTERECTOMY      INSERTION, CATHETER, DIALYSIS, PERITONEAL, LAPAROSCOPIC N/A 4/25/2023    Procedure: INSERTION, CATHETER, DIALYSIS, PERITONEAL, LAPAROSCOPIC;  Surgeon: Marcelo Isaac MD;  Location: [facility] OR;  Service: General;  Laterality: N/A;    LAPAROSCOPIC LYSIS OF ADHESIONS N/A 4/25/2023    Procedure: LYSIS, ADHESIONS, LAPAROSCOPIC;  Surgeon: Marcelo Isaac MD;  Location: [facility] OR;  Service: General;  Laterality: N/A;    MASTECTOMY Right     OMENTOPEXY, LAPAROSCOPIC N/A 4/25/2023    Procedure: OMENTOPEXY, LAPAROSCOPIC;  Surgeon: Marcelo Isaac MD;  Location: [facility] OR;  Service: General;  Laterality: N/A;    REPLACEMENT OF IMPLANTABLE CARDIOVERTER-DEFIBRILLATOR (ICD) GENERATOR N/A 12/17/2018    Procedure: REPLACEMENT, ICD GENERATOR;  Surgeon: Jan Mckeon MD;  Location: [facility] EP LAB;  Service: Cardiology;  Laterality: N/A;  DONNA, CRT-D gen MD sulemaT, MAC, SK, 3 Prep    REVISION OF SKIN POCKET FOR CARDIOVERTER-DEFIBRILLATOR  12/17/2018    Procedure: Revision, Skin Pocket, For Cardioverter-Defibrillator;  Surgeon: Jan Mckeon MD;  Location: [facility] EP LAB;  Service: Cardiology;;    SQUAMOUS CELL CARCINOMA EXCISION      remved from forehead    TONSILLECTOMY         Family History   Problem Relation Age of Onset    Diabetes Father     Heart disease Father     Diabetes Sister     Heart disease Sister     Diabetes Brother     Heart disease Brother     Hypertension Brother     Diabetes Brother     Heart disease Brother     Hypertension Brother     Diabetes Brother     Heart disease Brother     Cancer Brother         colon    Diabetes Brother     Cancer Son         skin    Diabetes Son         prediabetes    Diabetes Daughter         prediabetes    Cancer Daughter         melanoma    Obesity Daughter     Melanoma Daughter     Diabetes Son      Asthma Mother     Hypertension Mother     Stroke Mother     No Known Problems Maternal Grandmother     No Known Problems Maternal Grandfather     No Known Problems Paternal Grandmother     No Known Problems Paternal Grandfather     Amblyopia Neg Hx     Blindness Neg Hx     Cataracts Neg Hx     Glaucoma Neg Hx     Macular degeneration Neg Hx     Retinal detachment Neg Hx     Strabismus Neg Hx     Thyroid disease Neg Hx        Social History     Socioeconomic History    Marital status:    Occupational History    Occupation: Homemaker     Employer: OTHER   Tobacco Use    Smoking status: Never     Passive exposure: Never    Smokeless tobacco: Never   Substance and Sexual Activity    Alcohol use: No     Alcohol/week: 0.0 standard drinks    Drug use: No    Sexual activity: Yes     Partners: Male   Other Topics Concern    Are you pregnant or think you may be? No    Breast-feeding No     Social Determinants of Health     Financial Resource Strain: Low Risk     Difficulty of Paying Living Expenses: Not hard at all   Food Insecurity: No Food Insecurity    Worried About Running Out of Food in the Last Year: Never true    Ran Out of Food in the Last Year: Never true   Transportation Needs: No Transportation Needs    Lack of Transportation (Medical): No    Lack of Transportation (Non-Medical): No   Housing Stability: Low Risk     Unable to Pay for Housing in the Last Year: No    Number of Places Lived in the Last Year: 1    Unstable Housing in the Last Year: No       Current Outpatient Medications   Medication Sig Dispense Refill    acetaminophen (TYLENOL) 500 MG tablet Take 500 mg by mouth as needed.      albuterol (PROVENTIL/VENTOLIN HFA) 90 mcg/actuation inhaler Inhale 2 puffs into the lungs every 6 (six) hours as needed for Wheezing. Rescue 18 g 12    albuterol-ipratropium (DUO-NEB) 2.5 mg-0.5 mg/3 mL nebulizer solution TAKE 3 MLS BY NEBULIZATION EVERY 4 (FOUR) HOURS AS NEEDED FOR WHEEZING. 270 mL 12    aspirin 81  "MG Chew Take 81 mg by mouth once daily.      blood sugar diagnostic (ACCU-CHEK HECTOR) Strp Uses Accu-Check Hector meter to test BG 4x/day 400 strip 6    carvediloL (COREG) 25 MG tablet TAKE 2 TABLETS (50 MG TOTAL) BY MOUTH 2 (TWO) TIMES DAILY. 360 tablet 3    cholecalciferol, vitamin D3, (VITAMIN D3) 50 mcg (2,000 unit) Tab Take 1 tablet by mouth once daily.       cyanocobalamin (VITAMIN B-12) 100 MCG tablet Take 100 mcg by mouth once daily.      doxycycline (VIBRA-TABS) 100 MG tablet Take 1 tablet (100 mg total) by mouth every 12 (twelve) hours. for 14 days 28 tablet 0    dulaglutide (TRULICITY) 1.5 mg/0.5 mL pen injector Inject 1.5 mg into the skin every 7 days. 4 pen 11    epoetin [place]-epbx (RETACRIT) 10,000 unit/mL imjection Inject 1 mL (10,000 Units total) into the skin every 30 days. 1 mL 11    estradioL (ESTRACE) 0.01 % (0.1 mg/gram) vaginal cream Place 1 g vaginally every other day. 42.5 g 11    furosemide (LASIX) 40 MG tablet Take 1 tablet (40 mg total) by mouth once daily. 90 tablet 3    hydrALAZINE (APRESOLINE) 100 MG tablet Take 1 tablet (100 mg total) by mouth 2 (two) times daily. 90 tablet 3    HYDROcodone-acetaminophen (NORCO) 5-325 mg per tablet Take 1 tablet by mouth every 4 (four) hours as needed for Pain. 5 tablet 0    insulin (LANTUS SOLOSTAR U-100 INSULIN) glargine 100 units/mL SubQ pen Inject 10 Units into the skin every evening. 30 mL 3    lancets (ACCU-CHEK SOFTCLIX LANCETS) Misc Uses Accu-Chek Hector meter to test BG 2x/day 400 each 6    montelukast (SINGULAIR) 10 mg tablet Take 1 tablet (10 mg total) by mouth once daily. 90 tablet 3    nitroGLYCERIN (NITROSTAT) 0.4 MG SL tablet Place 0.4 mg under the tongue every 5 (five) minutes as needed for Chest pain.       omega-3 fatty acids 500 mg Cap Take 1 capsule by mouth Daily.      pen needle, diabetic (BD ULTRA-FINE MINI PEN NEEDLE) 31 gauge x 3/16" Ndle Use with insulin twice a day. 400 each 3    polyethylene glycol (GLYCOLAX) 17 gram PwPk Take " "17 g by mouth daily as needed (constipation). 10 each 1    pravastatin (PRAVACHOL) 40 MG tablet Take 1 tablet (40 mg total) by mouth once daily. 90 tablet 3    pulse oximeter (PULSE OXIMETER) device by Apply Externally route 2 (two) times a day. Use twice daily at 8 AM and 3 PM and record the value in MyChart as directed. 1 each 0    senna-docusate 8.6-50 mg (PERICOLACE) 8.6-50 mg per tablet Take 1 tablet by mouth 2 (two) times daily.      sodium bicarbonate 650 MG tablet Take 2 tablets (1,300 mg total) by mouth 2 (two) times daily. 360 tablet 3    valsartan (DIOVAN) 80 MG tablet TAKE 1 TABLET BY MOUTH 2 TIMES DAILY. 180 tablet 3     Current Facility-Administered Medications   Medication Dose Route Frequency Provider Last Rate Last Admin    fluorescein 500 mg/5 mL (10 %) injection 500 mg  5 mL Intravenous Once D. Jean Arechiga MD           Review of patient's allergies indicates:   Allergen Reactions    Iodine and iodide containing products Hives and Other (See Comments)     Not specified     Nifedipine      weakness       OB History          3    Para   3    Term   3            AB        Living             SAB        IAB        Ectopic        Multiple        Live Births                      ROS  As per HPI.      Physical Exam  BP (!) 157/67   Pulse 77   Ht 5' 3" (1.6 m)   Wt 55.3 kg (121 lb 14.6 oz)   LMP  (LMP Unknown)   BMI 21.60 kg/m²   General: alert and oriented, no acute distress  Respiratory: normal respiratory effort  Abd: soft, non-tender, non-distended  Pelvic:  Ext. Genitalia: normal external genitalia. Normal bartholin's and skeens glands  Vagina: -- atrophy. Normal vaginal mucosa without lesions. No discharge noted.   Non-tender bladder base without palpable mass.  Cervix: absent  Uterus:  surgically absent, vaginal cuff well healed   Urethra: no masses or tenderness  Urethral meatus: no lesions, caruncle or prolapse.    Impression  1. Urinary retention  Urine culture      2. " Encounter for fitting and adjustment of pessary          We reviewed the above issues and discussed options for short-term versus long-term management of her problems.     Plan:   Incomplete bladder emptying due to prolapse  -- pessary #5 ring with support and incontinence knob  -- re-fit with #4 ring with support and incontinence knob, ordered today through [employer]  -- PVR today with #5 pessary in still 150 cc     Vaginal atrophy (dryness):  --  Use 1 gram of estrogen cream INSIDE vagina and at opening of vagina two nights a week. Apply to opening of vagina, as well, as inside vagina and at inner lips outside vagina.      RTC in 2 months for pessary insertion of #4     30 minutes were spent in face to face time with this patient  80 % of this time was spent in counseling and/or coordination of care    Eric Rivera PA-C  Division of Female Pelvic Medicine and Reconstructive Surgery  Department of Obstetrics & Gynecology  [facility] [place], [place]

## 2023-06-14 NOTE — PATIENT INSTRUCTIONS
Incomplete bladder emptying due to prolapse  -- pessary #5 ring with support and incontinence knob  -- re-fit with #4 ring with support and incontinence knob, ordered today through [employer]  -- PVR today with #5 pessary in still 150 cc     Vaginal atrophy (dryness):  --  Use 1 gram of estrogen cream INSIDE vagina and at opening of vagina two nights a week. Apply to opening of vagina, as well, as inside vagina and at inner lips outside vagina.      RTC in 2 months for pessary insertion of #4

## 2023-06-15 LAB — BACTERIA UR CULT: NO GROWTH

## 2023-06-16 ENCOUNTER — TELEPHONE (OUTPATIENT)
Dept: UROGYNECOLOGY | Facility: CLINIC | Age: 83
End: 2023-06-16

## 2023-06-16 NOTE — TELEPHONE ENCOUNTER
----- Message from Eric Rivera PA-C sent at 6/16/2023  7:39 AM CDT -----  Please let patient's daughter know that urine was negative for infection, no UTI.     Thanks,   Eric

## 2023-06-16 NOTE — TELEPHONE ENCOUNTER
Spoke to patient's daughter, Betsy.  Let patient's daughter know that urine was negative for infection, no UTI.  Betsy's daughter, Betsy,  verbalized understanding.  Call ended

## 2023-06-19 ENCOUNTER — PATIENT MESSAGE (OUTPATIENT)
Dept: OTOLARYNGOLOGY | Facility: CLINIC | Age: 83
End: 2023-06-19
Payer: MEDICARE

## 2023-06-19 ENCOUNTER — OFFICE VISIT (OUTPATIENT)
Dept: SURGERY | Facility: CLINIC | Age: 83
End: 2023-06-19
Payer: MEDICARE

## 2023-06-19 VITALS
WEIGHT: 123.13 LBS | BODY MASS INDEX: 21.82 KG/M2 | SYSTOLIC BLOOD PRESSURE: 155 MMHG | HEART RATE: 87 BPM | DIASTOLIC BLOOD PRESSURE: 68 MMHG | HEIGHT: 63 IN

## 2023-06-19 DIAGNOSIS — N18.5 CKD (CHRONIC KIDNEY DISEASE), STAGE V: Primary | ICD-10-CM

## 2023-06-19 PROCEDURE — 1159F MED LIST DOCD IN RCRD: CPT | Mod: CPTII,S$GLB,, | Performed by: STUDENT IN AN ORGANIZED HEALTH CARE EDUCATION/TRAINING PROGRAM

## 2023-06-19 PROCEDURE — 3288F PR FALLS RISK ASSESSMENT DOCUMENTED: ICD-10-PCS | Mod: CPTII,S$GLB,, | Performed by: STUDENT IN AN ORGANIZED HEALTH CARE EDUCATION/TRAINING PROGRAM

## 2023-06-19 PROCEDURE — 99024 POSTOP FOLLOW-UP VISIT: CPT | Mod: S$GLB,,, | Performed by: STUDENT IN AN ORGANIZED HEALTH CARE EDUCATION/TRAINING PROGRAM

## 2023-06-19 PROCEDURE — 1126F AMNT PAIN NOTED NONE PRSNT: CPT | Mod: CPTII,S$GLB,, | Performed by: STUDENT IN AN ORGANIZED HEALTH CARE EDUCATION/TRAINING PROGRAM

## 2023-06-19 PROCEDURE — 99999 PR PBB SHADOW E&M-EST. PATIENT-LVL IV: CPT | Mod: PBBFAC,,, | Performed by: STUDENT IN AN ORGANIZED HEALTH CARE EDUCATION/TRAINING PROGRAM

## 2023-06-19 PROCEDURE — 3288F FALL RISK ASSESSMENT DOCD: CPT | Mod: CPTII,S$GLB,, | Performed by: STUDENT IN AN ORGANIZED HEALTH CARE EDUCATION/TRAINING PROGRAM

## 2023-06-19 PROCEDURE — 99024 PR POST-OP FOLLOW-UP VISIT: ICD-10-PCS | Mod: S$GLB,,, | Performed by: STUDENT IN AN ORGANIZED HEALTH CARE EDUCATION/TRAINING PROGRAM

## 2023-06-19 PROCEDURE — 99499 RISK ADDL DX/OHS AUDIT: ICD-10-PCS | Mod: S$GLB,,, | Performed by: STUDENT IN AN ORGANIZED HEALTH CARE EDUCATION/TRAINING PROGRAM

## 2023-06-19 PROCEDURE — 1160F PR REVIEW ALL MEDS BY PRESCRIBER/CLIN PHARMACIST DOCUMENTED: ICD-10-PCS | Mod: CPTII,S$GLB,, | Performed by: STUDENT IN AN ORGANIZED HEALTH CARE EDUCATION/TRAINING PROGRAM

## 2023-06-19 PROCEDURE — 3077F PR MOST RECENT SYSTOLIC BLOOD PRESSURE >= 140 MM HG: ICD-10-PCS | Mod: CPTII,S$GLB,, | Performed by: STUDENT IN AN ORGANIZED HEALTH CARE EDUCATION/TRAINING PROGRAM

## 2023-06-19 PROCEDURE — 1160F RVW MEDS BY RX/DR IN RCRD: CPT | Mod: CPTII,S$GLB,, | Performed by: STUDENT IN AN ORGANIZED HEALTH CARE EDUCATION/TRAINING PROGRAM

## 2023-06-19 PROCEDURE — 1101F PR PT FALLS ASSESS DOC 0-1 FALLS W/OUT INJ PAST YR: ICD-10-PCS | Mod: CPTII,S$GLB,, | Performed by: STUDENT IN AN ORGANIZED HEALTH CARE EDUCATION/TRAINING PROGRAM

## 2023-06-19 PROCEDURE — 1126F PR PAIN SEVERITY QUANTIFIED, NO PAIN PRESENT: ICD-10-PCS | Mod: CPTII,S$GLB,, | Performed by: STUDENT IN AN ORGANIZED HEALTH CARE EDUCATION/TRAINING PROGRAM

## 2023-06-19 PROCEDURE — 1101F PT FALLS ASSESS-DOCD LE1/YR: CPT | Mod: CPTII,S$GLB,, | Performed by: STUDENT IN AN ORGANIZED HEALTH CARE EDUCATION/TRAINING PROGRAM

## 2023-06-19 PROCEDURE — 3077F SYST BP >= 140 MM HG: CPT | Mod: CPTII,S$GLB,, | Performed by: STUDENT IN AN ORGANIZED HEALTH CARE EDUCATION/TRAINING PROGRAM

## 2023-06-19 PROCEDURE — 3078F PR MOST RECENT DIASTOLIC BLOOD PRESSURE < 80 MM HG: ICD-10-PCS | Mod: CPTII,S$GLB,, | Performed by: STUDENT IN AN ORGANIZED HEALTH CARE EDUCATION/TRAINING PROGRAM

## 2023-06-19 PROCEDURE — 99499 UNLISTED E&M SERVICE: CPT | Mod: S$GLB,,, | Performed by: STUDENT IN AN ORGANIZED HEALTH CARE EDUCATION/TRAINING PROGRAM

## 2023-06-19 PROCEDURE — 99999 PR PBB SHADOW E&M-EST. PATIENT-LVL IV: ICD-10-PCS | Mod: PBBFAC,,, | Performed by: STUDENT IN AN ORGANIZED HEALTH CARE EDUCATION/TRAINING PROGRAM

## 2023-06-19 PROCEDURE — 1159F PR MEDICATION LIST DOCUMENTED IN MEDICAL RECORD: ICD-10-PCS | Mod: CPTII,S$GLB,, | Performed by: STUDENT IN AN ORGANIZED HEALTH CARE EDUCATION/TRAINING PROGRAM

## 2023-06-19 PROCEDURE — 3078F DIAST BP <80 MM HG: CPT | Mod: CPTII,S$GLB,, | Performed by: STUDENT IN AN ORGANIZED HEALTH CARE EDUCATION/TRAINING PROGRAM

## 2023-06-19 NOTE — PROGRESS NOTES
Feeling well s/p lap PD catheter  Working well, used for PD 6 times no issues  No pain  No fevers  No drainage  Skin exit site looks good  Nontender, no erythema  Finished abx  RTC prn

## 2023-06-20 NOTE — PROGRESS NOTES
Patient Myesha Quinones, MRN [id number], was dependent on dialysis (ICD10 Z99.2) at the time of this visit on 6/19/23. This addendum is made to the medical record on 06/20/2023.

## 2023-06-23 ENCOUNTER — PATIENT MESSAGE (OUTPATIENT)
Dept: OTOLARYNGOLOGY | Facility: CLINIC | Age: 83
End: 2023-06-23
Payer: MEDICARE

## 2023-06-26 ENCOUNTER — PATIENT MESSAGE (OUTPATIENT)
Dept: OTOLARYNGOLOGY | Facility: CLINIC | Age: 83
End: 2023-06-26
Payer: MEDICARE

## 2023-06-29 ENCOUNTER — PATIENT MESSAGE (OUTPATIENT)
Dept: OTOLARYNGOLOGY | Facility: CLINIC | Age: 83
End: 2023-06-29
Payer: MEDICARE

## 2023-07-05 ENCOUNTER — PES CALL (OUTPATIENT)
Dept: ADMINISTRATIVE | Facility: CLINIC | Age: 83
End: 2023-07-05
Payer: MEDICARE

## 2023-07-06 ENCOUNTER — LAB VISIT (OUTPATIENT)
Dept: LAB | Facility: HOSPITAL | Age: 83
End: 2023-07-06
Attending: FAMILY MEDICINE
Payer: MEDICARE

## 2023-07-06 ENCOUNTER — OFFICE VISIT (OUTPATIENT)
Dept: FAMILY MEDICINE | Facility: CLINIC | Age: 83
End: 2023-07-06
Attending: FAMILY MEDICINE
Payer: MEDICARE

## 2023-07-06 VITALS
HEIGHT: 63 IN | DIASTOLIC BLOOD PRESSURE: 54 MMHG | WEIGHT: 128.75 LBS | SYSTOLIC BLOOD PRESSURE: 124 MMHG | OXYGEN SATURATION: 98 % | HEART RATE: 84 BPM | BODY MASS INDEX: 22.81 KG/M2

## 2023-07-06 DIAGNOSIS — N18.32 TYPE 2 DIABETES MELLITUS WITH STAGE 3B CHRONIC KIDNEY DISEASE, WITH LONG-TERM CURRENT USE OF INSULIN: ICD-10-CM

## 2023-07-06 DIAGNOSIS — I50.42 CHRONIC COMBINED SYSTOLIC AND DIASTOLIC HEART FAILURE: ICD-10-CM

## 2023-07-06 DIAGNOSIS — R05.9 COUGH, UNSPECIFIED TYPE: ICD-10-CM

## 2023-07-06 DIAGNOSIS — E11.59 HYPERTENSION ASSOCIATED WITH DIABETES: ICD-10-CM

## 2023-07-06 DIAGNOSIS — R80.9 MICROALBUMINURIA DUE TO TYPE 2 DIABETES MELLITUS: ICD-10-CM

## 2023-07-06 DIAGNOSIS — F32.0 CURRENT MILD EPISODE OF MAJOR DEPRESSIVE DISORDER WITHOUT PRIOR EPISODE: ICD-10-CM

## 2023-07-06 DIAGNOSIS — J44.9 CHRONIC OBSTRUCTIVE PULMONARY DISEASE, UNSPECIFIED COPD TYPE: ICD-10-CM

## 2023-07-06 DIAGNOSIS — Z79.4 TYPE 2 DIABETES MELLITUS WITH STAGE 3B CHRONIC KIDNEY DISEASE, WITH LONG-TERM CURRENT USE OF INSULIN: ICD-10-CM

## 2023-07-06 DIAGNOSIS — E78.5 HYPERLIPIDEMIA ASSOCIATED WITH TYPE 2 DIABETES MELLITUS: ICD-10-CM

## 2023-07-06 DIAGNOSIS — E11.22 TYPE 2 DIABETES MELLITUS WITH STAGE 3B CHRONIC KIDNEY DISEASE, WITH LONG-TERM CURRENT USE OF INSULIN: ICD-10-CM

## 2023-07-06 DIAGNOSIS — E11.42 DIABETIC POLYNEUROPATHY ASSOCIATED WITH TYPE 2 DIABETES MELLITUS: ICD-10-CM

## 2023-07-06 DIAGNOSIS — E11.69 HYPERLIPIDEMIA ASSOCIATED WITH TYPE 2 DIABETES MELLITUS: ICD-10-CM

## 2023-07-06 DIAGNOSIS — N18.4 CKD (CHRONIC KIDNEY DISEASE) STAGE 4, GFR 15-29 ML/MIN: Primary | ICD-10-CM

## 2023-07-06 DIAGNOSIS — I15.2 HYPERTENSION ASSOCIATED WITH DIABETES: ICD-10-CM

## 2023-07-06 DIAGNOSIS — E11.29 MICROALBUMINURIA DUE TO TYPE 2 DIABETES MELLITUS: ICD-10-CM

## 2023-07-06 LAB
ESTIMATED AVG GLUCOSE: 114 MG/DL (ref 68–131)
HBA1C MFR BLD: 5.6 % (ref 4–5.6)

## 2023-07-06 PROCEDURE — 1101F PR PT FALLS ASSESS DOC 0-1 FALLS W/OUT INJ PAST YR: ICD-10-PCS | Mod: CPTII,S$GLB,, | Performed by: FAMILY MEDICINE

## 2023-07-06 PROCEDURE — 3078F DIAST BP <80 MM HG: CPT | Mod: CPTII,S$GLB,, | Performed by: FAMILY MEDICINE

## 2023-07-06 PROCEDURE — 1126F AMNT PAIN NOTED NONE PRSNT: CPT | Mod: CPTII,S$GLB,, | Performed by: FAMILY MEDICINE

## 2023-07-06 PROCEDURE — 3288F FALL RISK ASSESSMENT DOCD: CPT | Mod: CPTII,S$GLB,, | Performed by: FAMILY MEDICINE

## 2023-07-06 PROCEDURE — 1159F PR MEDICATION LIST DOCUMENTED IN MEDICAL RECORD: ICD-10-PCS | Mod: CPTII,S$GLB,, | Performed by: FAMILY MEDICINE

## 2023-07-06 PROCEDURE — 1160F PR REVIEW ALL MEDS BY PRESCRIBER/CLIN PHARMACIST DOCUMENTED: ICD-10-PCS | Mod: CPTII,S$GLB,, | Performed by: FAMILY MEDICINE

## 2023-07-06 PROCEDURE — 99215 OFFICE O/P EST HI 40 MIN: CPT | Mod: S$GLB,,, | Performed by: FAMILY MEDICINE

## 2023-07-06 PROCEDURE — 3074F PR MOST RECENT SYSTOLIC BLOOD PRESSURE < 130 MM HG: ICD-10-PCS | Mod: CPTII,S$GLB,, | Performed by: FAMILY MEDICINE

## 2023-07-06 PROCEDURE — 99499 RISK ADDL DX/OHS AUDIT: ICD-10-PCS | Mod: S$GLB,,, | Performed by: FAMILY MEDICINE

## 2023-07-06 PROCEDURE — 3074F SYST BP LT 130 MM HG: CPT | Mod: CPTII,S$GLB,, | Performed by: FAMILY MEDICINE

## 2023-07-06 PROCEDURE — 1101F PT FALLS ASSESS-DOCD LE1/YR: CPT | Mod: CPTII,S$GLB,, | Performed by: FAMILY MEDICINE

## 2023-07-06 PROCEDURE — 3078F PR MOST RECENT DIASTOLIC BLOOD PRESSURE < 80 MM HG: ICD-10-PCS | Mod: CPTII,S$GLB,, | Performed by: FAMILY MEDICINE

## 2023-07-06 PROCEDURE — 99499 UNLISTED E&M SERVICE: CPT | Mod: S$GLB,,, | Performed by: FAMILY MEDICINE

## 2023-07-06 PROCEDURE — 36415 COLL VENOUS BLD VENIPUNCTURE: CPT | Performed by: FAMILY MEDICINE

## 2023-07-06 PROCEDURE — 99215 PR OFFICE/OUTPT VISIT, EST, LEVL V, 40-54 MIN: ICD-10-PCS | Mod: S$GLB,,, | Performed by: FAMILY MEDICINE

## 2023-07-06 PROCEDURE — 1159F MED LIST DOCD IN RCRD: CPT | Mod: CPTII,S$GLB,, | Performed by: FAMILY MEDICINE

## 2023-07-06 PROCEDURE — 83036 HEMOGLOBIN GLYCOSYLATED A1C: CPT | Performed by: FAMILY MEDICINE

## 2023-07-06 PROCEDURE — 3288F PR FALLS RISK ASSESSMENT DOCUMENTED: ICD-10-PCS | Mod: CPTII,S$GLB,, | Performed by: FAMILY MEDICINE

## 2023-07-06 PROCEDURE — 99999 PR PBB SHADOW E&M-EST. PATIENT-LVL V: CPT | Mod: PBBFAC,,, | Performed by: FAMILY MEDICINE

## 2023-07-06 PROCEDURE — 99999 PR PBB SHADOW E&M-EST. PATIENT-LVL V: ICD-10-PCS | Mod: PBBFAC,,, | Performed by: FAMILY MEDICINE

## 2023-07-06 PROCEDURE — 1126F PR PAIN SEVERITY QUANTIFIED, NO PAIN PRESENT: ICD-10-PCS | Mod: CPTII,S$GLB,, | Performed by: FAMILY MEDICINE

## 2023-07-06 PROCEDURE — 1160F RVW MEDS BY RX/DR IN RCRD: CPT | Mod: CPTII,S$GLB,, | Performed by: FAMILY MEDICINE

## 2023-07-06 RX ORDER — IPRATROPIUM BROMIDE AND ALBUTEROL SULFATE 2.5; .5 MG/3ML; MG/3ML
3 SOLUTION RESPIRATORY (INHALATION) EVERY 4 HOURS PRN
Qty: 270 ML | Refills: 12 | Status: SHIPPED | OUTPATIENT
Start: 2023-07-06 | End: 2023-07-31 | Stop reason: SDUPTHER

## 2023-07-06 NOTE — PROGRESS NOTES
Subjective:       Patient ID: Myesha Quinones is a 83 y.o. female.    Chief Complaint: Follow-up    83 YR OLD PLEASANT FEMALE WITH DM II, CKD III, CHF W/GERALDINE, COPD, DEPRESSION AND OTHER CO MORBIDITIES PRESENTS TODAY FOR 3 month follow up. She is in CKD 5 and on peritoneal dialysis two times a week. Follows nepohrology and need some referrals.    DM II - CONTROLLED - RECENT A1C WENT UP SINCE SHE TOOK STEROIDS IN HOSPITAL - BUT SHE FOLLOWING ENDO AND ON INSULIN and trulicity -  HGBA1C                   5.5                 01/06/2023                     CHF WITH NORMAL EF - FOLLOWS CARDIOLOGY - ON ASA    HTN - CONTROLLED    CKD III - FOLLOWS NEPHROLOGY - NO NEW ISSUES       HISTORY AS BELOW - REVIEWED IN DETAIL        Follow-up  Associated symptoms include fatigue and weakness. Pertinent negatives include no arthralgias, chest pain, congestion, diaphoresis, headaches, myalgias, nausea or sore throat.   Medication Refill  Associated symptoms include fatigue and weakness. Pertinent negatives include no arthralgias, chest pain, congestion, diaphoresis, headaches, myalgias, nausea or sore throat.   Diabetes  She presents for her follow-up diabetic visit. She has type 2 diabetes mellitus. Her disease course has been stable. There are no hypoglycemic associated symptoms. Pertinent negatives for hypoglycemia include no confusion, dizziness, headaches, nervousness/anxiousness, pallor, seizures, speech difficulty or tremors. Associated symptoms include fatigue and weakness. Pertinent negatives for diabetes include no chest pain, no polydipsia and no polyuria. There are no hypoglycemic complications. Symptoms are stable. Diabetic complications include heart disease. Risk factors for coronary artery disease include diabetes mellitus, hypertension, post-menopausal and sedentary lifestyle. Current diabetic treatment includes insulin injections and oral agent (monotherapy). She is compliant with treatment all of the time. She  rarely participates in exercise. An ACE inhibitor/angiotensin II receptor blocker is being taken. She does not see a podiatrist.Eye exam is current.   Review of Systems   Constitutional:  Positive for fatigue. Negative for activity change, diaphoresis and unexpected weight change.   HENT: Negative.  Negative for nasal congestion, ear discharge, hearing loss, rhinorrhea, sore throat and voice change.    Eyes: Negative.  Negative for pain, discharge and visual disturbance.   Respiratory: Negative.  Negative for chest tightness, shortness of breath and wheezing.    Cardiovascular: Negative.  Negative for chest pain.   Gastrointestinal: Negative.  Negative for abdominal distention, anal bleeding, constipation and nausea.   Endocrine: Negative.  Negative for cold intolerance, polydipsia and polyuria.   Genitourinary: Negative.  Negative for decreased urine volume, difficulty urinating, dysuria, frequency, menstrual problem and vaginal pain.   Musculoskeletal: Negative.  Negative for arthralgias, gait problem and myalgias.   Integumentary:  Negative for color change, pallor and wound. Negative.   Allergic/Immunologic: Negative.  Negative for environmental allergies and immunocompromised state.   Neurological:  Positive for weakness. Negative for dizziness, tremors, seizures, speech difficulty and headaches.   Hematological: Negative.  Negative for adenopathy. Does not bruise/bleed easily.   Psychiatric/Behavioral: Negative.  Negative for agitation, confusion, decreased concentration, hallucinations, self-injury and suicidal ideas. The patient is not nervous/anxious.        Past Medical History:   Diagnosis Date    Acute hypoxemic respiratory failure 12/19/2019    Acute right-sided thoracic back pain 12/09/2019    Allergy     Asthma     Basal cell carcinoma     left forehead    Basal cell carcinoma     left nose    Basal cell carcinoma 05/20/2015    right nose    Basal cell carcinoma 12/22/2015    left lower post neck     Basal cell carcinoma 12/03/2019    left ant scalp     Basal cell carcinoma of right ala nasi 10/20/2022    Bilateral pleural effusion     Bilateral renal cysts     Breast cancer     CAD (coronary artery disease)     Cardiomyopathy     Cardiomyopathy, ischemic     Cataract     CHF (congestive heart failure)     CKD (chronic kidney disease) stage 4, GFR 15-29 ml/min     Colon polyp 2011    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, with long-term current use of insulin 02/22/2018    COPD (chronic obstructive pulmonary disease)     COPD exacerbation 04/08/2018    Current mild episode of major depressive disorder without prior episode 01/25/2022    Defibrillator discharge     Diabetes mellitus     Diabetes mellitus type II     Diabetes with neurologic complications     Edema     ESRD needing dialysis     Goiter     MNG    Hematuria, unspecified     HX: breast cancer     Hyperlipidemia     Hypertension     Hypocalcemia     Hypokalemia     Hyponatremia     Hyponatremia 04/02/2022    Iron deficiency anemia 05/16/2017    Iron deficiency anemia     Iron deficiency anemia     Left kidney mass     Meningioma     Microalbuminuria due to type 2 diabetes mellitus 01/26/2022    Osteoporosis, postmenopausal     Pneumonia 12/08/2019    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    Proteinuria 01/21/2019    Pseudomonas pneumonia     Skin cancer     s/p excision    Sleep apnea     CPAP    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/2015    mid forehead    Unspecified vitamin D deficiency     UTI (urinary tract infection) 04/02/2022    Ventricular tachycardia     Vitamin B12 deficiency     Vitamin D deficiency disease        Past Surgical History:   Procedure Laterality Date    BASAL CELL CARCINOMA EXCISION      posterior neck and nose    BREAST BIOPSY      BREAST CYST EXCISION Left     BREAST SURGERY      CARDIAC DEFIBRILLATOR PLACEMENT      x 2    CATARACT EXTRACTION W/   INTRAOCULAR LENS IMPLANT Bilateral     CHOLECYSTECTOMY      COLONOSCOPY N/A 11/5/2019    Procedure: COLONOSCOPY;  Surgeon: Boaz Botello MD;  Location[facility]  Service: Endoscopy;  Laterality: N/A;  AICD - [employer] -     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    FRACTURE SURGERY      left elbow and wrist as a child    HYSTERECTOMY      INSERTION, CATHETER, DIALYSIS, PERITONEAL, LAPAROSCOPIC N/A 4/25/2023    Procedure: INSERTION, CATHETER, DIALYSIS, PERITONEAL, LAPAROSCOPIC;  Surgeon: Marcelo Isaac MD;  Location: [facility] OR;  Service: General;  Laterality: N/A;    LAPAROSCOPIC LYSIS OF ADHESIONS N/A 4/25/2023    Procedure: LYSIS, ADHESIONS, LAPAROSCOPIC;  Surgeon: Marcelo Isaac MD;  Location: [facility] OR;  Service: General;  Laterality: N/A;    MASTECTOMY Right     OMENTOPEXY, LAPAROSCOPIC N/A 4/25/2023    Procedure: OMENTOPEXY, LAPAROSCOPIC;  Surgeon: Marcelo Isaac MD;  Location: [facility] OR;  Service: General;  Laterality: N/A;    REPLACEMENT OF IMPLANTABLE CARDIOVERTER-DEFIBRILLATOR (ICD) GENERATOR N/A 12/17/2018    Procedure: REPLACEMENT, ICD GENERATOR;  Surgeon: Jan Mckeon MD;  Location: [facility] EP LAB;  Service: Cardiology;  Laterality: N/A;  DONNA, CRT-D gen change, MDT, MAC, SK, 3 Prep    REVISION OF SKIN POCKET FOR CARDIOVERTER-DEFIBRILLATOR  12/17/2018    Procedure: Revision, Skin Pocket, For Cardioverter-Defibrillator;  Surgeon: Jan Mckeon MD;  Location: [facility] EP LAB;  Service: Cardiology;;    SQUAMOUS CELL CARCINOMA EXCISION      remved from forehead    TONSILLECTOMY         Family History   Problem Relation Age of Onset    Diabetes Father     Heart disease Father     Diabetes Sister     Heart disease Sister     Diabetes Brother     Heart disease Brother     Hypertension Brother     Diabetes Brother     Heart disease Brother     Hypertension Brother     Diabetes Brother     Heart disease Brother     Cancer Brother         colon    Diabetes Brother     Cancer Son         skin     Diabetes Son         prediabetes    Diabetes Daughter         prediabetes    Cancer Daughter         melanoma    Obesity Daughter     Melanoma Daughter     Diabetes Son     Asthma Mother     Hypertension Mother     Stroke Mother     No Known Problems Maternal Grandmother     No Known Problems Maternal Grandfather     No Known Problems Paternal Grandmother     No Known Problems Paternal Grandfather     Amblyopia Neg Hx     Blindness Neg Hx     Cataracts Neg Hx     Glaucoma Neg Hx     Macular degeneration Neg Hx     Retinal detachment Neg Hx     Strabismus Neg Hx     Thyroid disease Neg Hx        Social History     Socioeconomic History    Marital status:    Occupational History    Occupation: Homemaker     Employer: OTHER   Tobacco Use    Smoking status: Never     Passive exposure: Never    Smokeless tobacco: Never   Substance and Sexual Activity    Alcohol use: No     Alcohol/week: 0.0 standard drinks    Drug use: No    Sexual activity: Yes     Partners: Male   Other Topics Concern    Are you pregnant or think you may be? No    Breast-feeding No     Social Determinants of Health     Financial Resource Strain: Low Risk     Difficulty of Paying Living Expenses: Not hard at all   Food Insecurity: No Food Insecurity    Worried About Running Out of Food in the Last Year: Never true    Ran Out of Food in the Last Year: Never true   Transportation Needs: No Transportation Needs    Lack of Transportation (Medical): No    Lack of Transportation (Non-Medical): No   Housing Stability: Low Risk     Unable to Pay for Housing in the Last Year: No    Number of Places Lived in the Last Year: 1    Unstable Housing in the Last Year: No       Current Outpatient Medications   Medication Sig Dispense Refill    acetaminophen (TYLENOL) 500 MG tablet Take 500 mg by mouth as needed.      albuterol (PROVENTIL/VENTOLIN HFA) 90 mcg/actuation inhaler Inhale 2 puffs into the lungs every 6 (six) hours as needed for Wheezing. Rescue 18 g  "12    aspirin 81 MG Chew Take 81 mg by mouth once daily.      blood sugar diagnostic (ACCU-CHEK HECTOR) Strp Uses Accu-Check Hector meter to test BG 4x/day 400 strip 6    carvediloL (COREG) 25 MG tablet TAKE 2 TABLETS (50 MG TOTAL) BY MOUTH 2 (TWO) TIMES DAILY. 360 tablet 3    cholecalciferol, vitamin D3, (VITAMIN D3) 50 mcg (2,000 unit) Tab Take 1 tablet by mouth once daily.       cyanocobalamin (VITAMIN B-12) 100 MCG tablet Take 100 mcg by mouth once daily.      dulaglutide (TRULICITY) 1.5 mg/0.5 mL pen injector Inject 1.5 mg into the skin every 7 days. 4 pen 11    epoetin [place]-epbx (RETACRIT) 10,000 unit/mL imjection Inject 1 mL (10,000 Units total) into the skin every 30 days. 1 mL 11    estradioL (ESTRACE) 0.01 % (0.1 mg/gram) vaginal cream Place 1 g vaginally every other day. 42.5 g 11    furosemide (LASIX) 40 MG tablet Take 1 tablet (40 mg total) by mouth once daily. 90 tablet 3    hydrALAZINE (APRESOLINE) 100 MG tablet Take 1 tablet (100 mg total) by mouth 2 (two) times daily. 90 tablet 3    HYDROcodone-acetaminophen (NORCO) 5-325 mg per tablet Take 1 tablet by mouth every 4 (four) hours as needed for Pain. 5 tablet 0    insulin (LANTUS SOLOSTAR U-100 INSULIN) glargine 100 units/mL SubQ pen Inject 10 Units into the skin every evening. 30 mL 3    lancets (ACCU-CHEK SOFTCLIX LANCETS) Misc Uses Accu-Chek Hector meter to test BG 2x/day 400 each 6    montelukast (SINGULAIR) 10 mg tablet Take 1 tablet (10 mg total) by mouth once daily. 90 tablet 3    nitroGLYCERIN (NITROSTAT) 0.4 MG SL tablet Place 0.4 mg under the tongue every 5 (five) minutes as needed for Chest pain.       omega-3 fatty acids 500 mg Cap Take 1 capsule by mouth Daily.      pen needle, diabetic (BD ULTRA-FINE MINI PEN NEEDLE) 31 gauge x 3/16" Ndle Use with insulin twice a day. 400 each 3    polyethylene glycol (GLYCOLAX) 17 gram PwPk Take 17 g by mouth daily as needed (constipation). 10 each 1    pravastatin (PRAVACHOL) 40 MG tablet Take 1 tablet " (40 mg total) by mouth once daily. 90 tablet 3    pulse oximeter (PULSE OXIMETER) device by Apply Externally route 2 (two) times a day. Use twice daily at 8 AM and 3 PM and record the value in [facility] as directed. 1 each 0    senna-docusate 8.6-50 mg (PERICOLACE) 8.6-50 mg per tablet Take 1 tablet by mouth 2 (two) times daily.      sodium bicarbonate 650 MG tablet Take 2 tablets (1,300 mg total) by mouth 2 (two) times daily. 360 tablet 3    valsartan (DIOVAN) 80 MG tablet TAKE 1 TABLET BY MOUTH 2 TIMES DAILY. 180 tablet 3    albuterol-ipratropium (DUO-NEB) 2.5 mg-0.5 mg/3 mL nebulizer solution Take 3 mLs by nebulization every 4 (four) hours as needed for Wheezing. 270 mL 12     Current Facility-Administered Medications   Medication Dose Route Frequency Provider Last Rate Last Admin    fluorescein 500 mg/5 mL (10 %) injection 500 mg  5 mL Intravenous Once D. Jean Arechiga MD           Review of patient's allergies indicates:   Allergen Reactions    Iodine and iodide containing products Hives and Other (See Comments)     Not specified     Nifedipine      weakness       Objective:      Vitals:    07/06/23 0906   BP: (!) 124/54   Pulse: 84       Physical Exam  Constitutional:       General: She is not in acute distress.     Appearance: She is well-developed. She is not diaphoretic.   HENT:      Head: Normocephalic and atraumatic.      Right Ear: External ear normal.      Left Ear: External ear normal.      Nose: Nose normal.      Mouth/Throat:      Pharynx: No oropharyngeal exudate.   Eyes:      General: No scleral icterus.        Right eye: No discharge.         Left eye: No discharge.      Conjunctiva/sclera: Conjunctivae normal.      Pupils: Pupils are equal, round, and reactive to light.   Neck:      Thyroid: No thyromegaly.      Vascular: No JVD.      Trachea: No tracheal deviation.   Cardiovascular:      Rate and Rhythm: Normal rate and regular rhythm.      Heart sounds: Normal heart sounds. No murmur heard.     No friction rub. No gallop.   Pulmonary:      Effort: Pulmonary effort is normal.      Breath sounds: Normal breath sounds. No stridor. No wheezing or rales.   Chest:      Chest wall: No tenderness.   Abdominal:      General: Bowel sounds are normal. There is no distension.      Palpations: Abdomen is soft. There is no mass.      Tenderness: There is no abdominal tenderness. There is no guarding or rebound.      Hernia: No hernia is present.   Musculoskeletal:         General: No tenderness. Normal range of motion.      Cervical back: Normal range of motion and neck supple.   Lymphadenopathy:      Cervical: No cervical adenopathy.   Skin:     General: Skin is warm and dry.      Coloration: Skin is not pale.      Findings: No erythema or rash.   Neurological:      Mental Status: She is alert and oriented to person, place, and time.      Cranial Nerves: No cranial nerve deficit.      Motor: No abnormal muscle tone.      Coordination: Coordination normal.      Deep Tendon Reflexes: Reflexes are normal and symmetric. Reflexes normal.   Psychiatric:         Behavior: Behavior normal.         Thought Content: Thought content normal.         Judgment: Judgment normal.       Assessment:       Problem List Items Addressed This Visit       Type 2 diabetes mellitus with stage 3b chronic kidney disease, with long-term current use of insulin    Relevant Orders    Hemoglobin A1C    Microalbuminuria due to type 2 diabetes mellitus    Hypertension associated with diabetes    Hyperlipidemia associated with type 2 diabetes mellitus    Diabetic polyneuropathy associated with type 2 diabetes mellitus    Current mild episode of major depressive disorder without prior episode    CKD (chronic kidney disease) stage 4, GFR 15-29 ml/min - Primary    Relevant Orders    Ambulatory referral/consult to Nephrology    Chronic combined systolic and diastolic heart failure     Other Visit Diagnoses       Chronic obstructive pulmonary disease,  unspecified COPD type        Relevant Medications    albuterol-ipratropium (DUO-NEB) 2.5 mg-0.5 mg/3 mL nebulizer solution    Cough, unspecified type        Relevant Medications    albuterol-ipratropium (DUO-NEB) 2.5 mg-0.5 mg/3 mL nebulizer solution            Plan:       Myesha was seen today for follow-up.    Diagnoses and all orders for this visit:    CKD (chronic kidney disease) stage 4, GFR 15-29 ml/min  -     Ambulatory referral/consult to Nephrology; Future    Hypertension associated with diabetes    Chronic combined systolic and diastolic heart failure    Hyperlipidemia associated with type 2 diabetes mellitus    Type 2 diabetes mellitus with stage 3b chronic kidney disease, with long-term current use of insulin  -     Hemoglobin A1C; Future    Current mild episode of major depressive disorder without prior episode    Microalbuminuria due to type 2 diabetes mellitus    Chronic obstructive pulmonary disease, unspecified COPD type  -     albuterol-ipratropium (DUO-NEB) 2.5 mg-0.5 mg/3 mL nebulizer solution; Take 3 mLs by nebulization every 4 (four) hours as needed for Wheezing.    Diabetic polyneuropathy associated with type 2 diabetes mellitus    Cough, unspecified type  -     albuterol-ipratropium (DUO-NEB) 2.5 mg-0.5 mg/3 mL nebulizer solution; Take 3 mLs by nebulization every 4 (four) hours as needed for Wheezing.    DM II  -controlled    HTN  -controlled   -MONITOR  -dc diltiazem since on coreg already    CKD STAGE 4  -FOLLOW NEPHROLOGY  -ER AND NEPHRO PRECAUTIONS GIVEN  -continue PD    Depression  -controlled    COPD  -stable    Spent adequate time in obtaining history and explaining differentials    40 minutes spent during this visit of which greater than 50% devoted to face-face counseling and coordination of care regarding diagnosis and management plan      Follow up in about 3 months (around 10/6/2023), or if symptoms worsen or fail to improve.

## 2023-07-10 ENCOUNTER — PATIENT MESSAGE (OUTPATIENT)
Dept: OTOLARYNGOLOGY | Facility: CLINIC | Age: 83
End: 2023-07-10
Payer: MEDICARE

## 2023-07-17 ENCOUNTER — TELEPHONE (OUTPATIENT)
Dept: FAMILY MEDICINE | Facility: CLINIC | Age: 83
End: 2023-07-17
Payer: MEDICARE

## 2023-07-17 NOTE — TELEPHONE ENCOUNTER
----- Message from Carla Chan sent at 7/14/2023  2:58 PM CDT -----  Type:  Patient Returning Call    Who Called:[facility] insurance  Does the patient know what this is regarding?:clinical intake  Would the patient rather a call back or a response via [employer]? call  Best Call Back Number:fax  referral # [id number]  Additional Information: send back referral to [facility] for nephrologist neil

## 2023-07-17 NOTE — TELEPHONE ENCOUNTER
----- Message from Carla Chan sent at 7/14/2023  3:05 PM CDT -----  Type:  Needs Medical Advice    Who Called: [facility]   Would the patient rather a call back or a response via [facility]? call  Best Call Back Number: referral #[id number] fax # [phone]  Additional Information: referral needs to be sent to [facility] for [facility]

## 2023-07-28 ENCOUNTER — PATIENT MESSAGE (OUTPATIENT)
Dept: CARDIOLOGY | Facility: CLINIC | Age: 83
End: 2023-07-28
Payer: MEDICARE

## 2023-07-31 ENCOUNTER — OFFICE VISIT (OUTPATIENT)
Dept: PULMONOLOGY | Facility: CLINIC | Age: 83
End: 2023-07-31
Payer: MEDICARE

## 2023-07-31 ENCOUNTER — HOSPITAL ENCOUNTER (OUTPATIENT)
Dept: RADIOLOGY | Facility: HOSPITAL | Age: 83
Discharge: HOME | End: 2023-07-31
Attending: INTERNAL MEDICINE
Payer: MEDICARE

## 2023-07-31 VITALS
DIASTOLIC BLOOD PRESSURE: 68 MMHG | WEIGHT: 128.06 LBS | SYSTOLIC BLOOD PRESSURE: 128 MMHG | HEIGHT: 63 IN | BODY MASS INDEX: 22.69 KG/M2 | OXYGEN SATURATION: 99 % | HEART RATE: 78 BPM

## 2023-07-31 DIAGNOSIS — R91.8 MULTIPLE LUNG NODULES: ICD-10-CM

## 2023-07-31 DIAGNOSIS — J44.9 CHRONIC OBSTRUCTIVE PULMONARY DISEASE, UNSPECIFIED COPD TYPE: ICD-10-CM

## 2023-07-31 DIAGNOSIS — B44.81 ABPA (ALLERGIC BRONCHOPULMONARY ASPERGILLOSIS): ICD-10-CM

## 2023-07-31 DIAGNOSIS — J45.30 MILD PERSISTENT CHRONIC ASTHMA WITHOUT COMPLICATION: ICD-10-CM

## 2023-07-31 DIAGNOSIS — R05.9 COUGH, UNSPECIFIED TYPE: ICD-10-CM

## 2023-07-31 DIAGNOSIS — I50.42 CHRONIC COMBINED SYSTOLIC AND DIASTOLIC HEART FAILURE: ICD-10-CM

## 2023-07-31 DIAGNOSIS — J30.2 SEASONAL ALLERGIES: Primary | ICD-10-CM

## 2023-07-31 DIAGNOSIS — J90 PLEURAL EFFUSION: ICD-10-CM

## 2023-07-31 DIAGNOSIS — A31.0 MYCOBACTERIUM AVIUM COMPLEX: ICD-10-CM

## 2023-07-31 PROCEDURE — 99499 UNLISTED E&M SERVICE: CPT | Mod: S$GLB,,, | Performed by: FAMILY MEDICINE

## 2023-07-31 PROCEDURE — 3074F SYST BP LT 130 MM HG: CPT | Mod: CPTII,S$GLB,, | Performed by: INTERNAL MEDICINE

## 2023-07-31 PROCEDURE — 99499 RISK ADDL DX/OHS AUDIT: ICD-10-PCS | Mod: S$GLB,,, | Performed by: FAMILY MEDICINE

## 2023-07-31 PROCEDURE — 1101F PR PT FALLS ASSESS DOC 0-1 FALLS W/OUT INJ PAST YR: ICD-10-PCS | Mod: CPTII,S$GLB,, | Performed by: INTERNAL MEDICINE

## 2023-07-31 PROCEDURE — 99215 PR OFFICE/OUTPT VISIT, EST, LEVL V, 40-54 MIN: ICD-10-PCS | Mod: S$GLB,,, | Performed by: INTERNAL MEDICINE

## 2023-07-31 PROCEDURE — 99999 PR PBB SHADOW E&M-EST. PATIENT-LVL IV: ICD-10-PCS | Mod: PBBFAC,,, | Performed by: INTERNAL MEDICINE

## 2023-07-31 PROCEDURE — 1159F MED LIST DOCD IN RCRD: CPT | Mod: CPTII,S$GLB,, | Performed by: INTERNAL MEDICINE

## 2023-07-31 PROCEDURE — 99499 RISK ADDL DX/OHS AUDIT: ICD-10-PCS | Mod: S$GLB,,, | Performed by: INTERNAL MEDICINE

## 2023-07-31 PROCEDURE — 99999 PR PBB SHADOW E&M-EST. PATIENT-LVL IV: CPT | Mod: PBBFAC,,, | Performed by: INTERNAL MEDICINE

## 2023-07-31 PROCEDURE — 3078F PR MOST RECENT DIASTOLIC BLOOD PRESSURE < 80 MM HG: ICD-10-PCS | Mod: CPTII,S$GLB,, | Performed by: INTERNAL MEDICINE

## 2023-07-31 PROCEDURE — 1160F PR REVIEW ALL MEDS BY PRESCRIBER/CLIN PHARMACIST DOCUMENTED: ICD-10-PCS | Mod: CPTII,S$GLB,, | Performed by: INTERNAL MEDICINE

## 2023-07-31 PROCEDURE — 99215 OFFICE O/P EST HI 40 MIN: CPT | Mod: S$GLB,,, | Performed by: INTERNAL MEDICINE

## 2023-07-31 PROCEDURE — 71250 CT THORAX DX C-: CPT | Mod: TC

## 2023-07-31 PROCEDURE — 3074F PR MOST RECENT SYSTOLIC BLOOD PRESSURE < 130 MM HG: ICD-10-PCS | Mod: CPTII,S$GLB,, | Performed by: INTERNAL MEDICINE

## 2023-07-31 PROCEDURE — 3288F PR FALLS RISK ASSESSMENT DOCUMENTED: ICD-10-PCS | Mod: CPTII,S$GLB,, | Performed by: INTERNAL MEDICINE

## 2023-07-31 PROCEDURE — 3078F DIAST BP <80 MM HG: CPT | Mod: CPTII,S$GLB,, | Performed by: INTERNAL MEDICINE

## 2023-07-31 PROCEDURE — 99499 UNLISTED E&M SERVICE: CPT | Mod: S$GLB,,, | Performed by: INTERNAL MEDICINE

## 2023-07-31 PROCEDURE — 3288F FALL RISK ASSESSMENT DOCD: CPT | Mod: CPTII,S$GLB,, | Performed by: INTERNAL MEDICINE

## 2023-07-31 PROCEDURE — 1101F PT FALLS ASSESS-DOCD LE1/YR: CPT | Mod: CPTII,S$GLB,, | Performed by: INTERNAL MEDICINE

## 2023-07-31 PROCEDURE — 1159F PR MEDICATION LIST DOCUMENTED IN MEDICAL RECORD: ICD-10-PCS | Mod: CPTII,S$GLB,, | Performed by: INTERNAL MEDICINE

## 2023-07-31 PROCEDURE — 1160F RVW MEDS BY RX/DR IN RCRD: CPT | Mod: CPTII,S$GLB,, | Performed by: INTERNAL MEDICINE

## 2023-07-31 RX ORDER — IPRATROPIUM BROMIDE AND ALBUTEROL SULFATE 2.5; .5 MG/3ML; MG/3ML
3 SOLUTION RESPIRATORY (INHALATION) EVERY 4 HOURS PRN
Qty: 270 ML | Refills: 12 | Status: SHIPPED | OUTPATIENT
Start: 2023-07-31

## 2023-07-31 NOTE — PROGRESS NOTES
Subjective:       Patient ID: Myesha Quinones is a 83 y.o. female.    Chief Complaint: No chief complaint on file.    HPI   Myesha Quinones has a past medical history of CAD, combined systolic and diastolic heart failure, LBBB, biventricular ICD, CKD4, IDDM, breast cancer, HTN, HLD, severe persistent asthma, HLD, who is here by virtual visit for continued shortness of breath, asthmatic symptoms, congestion and abnormal CT thorax.  Currently taking Spiriva, Wixela (250-50), Albuterol, Duonebs, Singulair, Flonase.      Tobacco/vape: never  Occupation: stayed at home  Exertional capacity: walks with a rolling walker to the parking garage  Prior lung disease: ABPA, asthma, MAC infection  Sputum production: no longer making sputum  Allergies/sinusitis: taking flonase.  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  Prior hospitalization/intubation: July 2022.     Today she is following up as scheduled.  She was recently started on PD and is having very good results managing her weight and fluid balance.  Repeat CT thorax today showed overall improvement in her bilateral scattered pulmonary nodules, improvement in bandlike/rounded atelectasis of the bilateral lower lobes, and less volume of left sided pleural effusion (pending formal radiology read). She continues to state her breathing is dramatically improved from the past.  She needs a refill on her duonebs today.    Review of Systems   Constitutional:  Negative for fever, chills, activity change, appetite change and night sweats.   HENT:  Negative for postnasal drip, rhinorrhea, sinus pressure and congestion.    Eyes: Negative.    Respiratory:  Negative for cough, sputum production, shortness of breath, wheezing, asthma nighttime symptoms, dyspnea on extertion and use of rescue  "inhaler.    Cardiovascular:  Negative for chest pain, palpitations and leg swelling.   Genitourinary: Negative.    Endocrine: endocrine negative    Musculoskeletal: Negative.    Skin: Negative.    Gastrointestinal:  Negative for nausea, vomiting, abdominal pain, abdominal distention and acid reflux.   Neurological: Negative.    Psychiatric/Behavioral: Negative.         Objective:       Vitals:    07/31/23 1352   BP: 128/68   BP Location: Left arm   Patient Position: Sitting   Pulse: 78   SpO2: 99%   Weight: 58.1 kg (128 lb 1.4 oz)   Height: 5' 3" (1.6 m)       Physical Exam   Constitutional: She is oriented to person, place, and time. She appears well-developed and well-nourished. She appears not cachectic. No distress.   HENT:   Head: Normocephalic.   Neck: No JVD present.   Cardiovascular: Normal rate, regular rhythm and normal heart sounds. Exam reveals no gallop and no friction rub.   No murmur heard.  Pulmonary/Chest: Normal expansion and symmetric chest wall expansion. No respiratory distress. She has no decreased breath sounds. She has no wheezes. She has no rhonchi. She has no rales.   Air entry equal bilaterally.  Normal work of breathing, no wheezing.  Negative for egophony. Negative for tactile fremitus.   Abdominal: Soft. Bowel sounds are normal. She exhibits no distension.   Musculoskeletal:         General: Edema present. Normal range of motion.      Cervical back: Normal range of motion.   Neurological: She is alert and oriented to person, place, and time.   Skin: Skin is warm and dry. She is not diaphoretic.   Psychiatric: She has a normal mood and affect. Her behavior is normal. Judgment and thought content normal.        Personal Diagnostic Review    CT Throax 7/31/23  overall improvement in her bilateral scattered pulmonary nodules, improvement in bandlike/rounded atelectasis of the bilateral lower lobes, and less volume of left sided pleural effusion (pending formal radiology read)     CT Chest " 2/9/2023  Mild to moderate-sized dependent left pleural effusion, improved from previous CT.  RUL and RML tree-in-bud that is stable and improving, likely from known MAC infection.  Subsegmental atelectasis in lingula. Bandlike atelectasis in the bases bilaterally.  Overall, improved from previous studies.    Chest x-ray 1/31/2023  Left chest multi lead pacer device.  Cardiac silhouette unchanged.  Chronic interstitial coarsening.  Continued opacification at the left lower lung which could relate to component of underlying consolidation and/or effusion.  Right-sided pleural effusion and/or thickening at the costophrenic margin, more conspicuous.  Vague ill-defined opacity at the peripheral right mid lung, more conspicuous from prior and possibly pleural based.  Suggest correlation with dedicated cross-sectional CT.  No gross pneumothorax.    CT thorax 2/26/2022:  Obliteration of the right bronchus intermedius and bilateral lower lobes basal segments bronchi possibly by mucous, aspiration or pneumonia.  There is complete volume loss of the middle lobe and patchy subsegmental atelectasis or airspace disease of the basal segments of the bilateral lower lobes right more than left.  Consider aspiration or pneumonia.  Enlarged precarinal and sub carinal nodes could be reactive to the underlying inflammatory process, less likely a neoplastic process not excluded.  Right upper lobe pleural base nodule, For a solid nodule >8 mm, Fleischner Society 2017 guidelines recommend considering follow-up CT at 3 months.  Significant coronary artery calcifications.  Bilateral small pleural effusion left more than right.  Bilateral benign adrenal adenoma.  Right thyroid lobe nodule slightly larger compared to the prior chest CT.  Ultrasound could be done if clinically warranted.    PFTs 10/2020:  FEV1/FVC - 50%  FEV1 - 0.85L (50%)  FVC - 1.71L (76%)  TLC - 2.95L (66%)  DLCO - 56%  Bronchodilator - no significant response    6MWT  10/2020:  No significant desaturation.  Walked 200feet with initial sat 97% and drop to 93% with exertion.  Recovered to 97% on room air.     No flowsheet data found.      Assessment:       1. Seasonal allergies    2. Chronic obstructive pulmonary disease, unspecified COPD type    3. Cough, unspecified type    4. Mild persistent chronic asthma without complication    5. Multiple lung nodules    6. Pleural effusion    7. Chronic combined systolic and diastolic heart failure    8. ABPA (allergic bronchopulmonary aspergillosis)    9. Mycobacterium avium complex            Outpatient Encounter Medications as of 7/31/2023   Medication Sig Dispense Refill    acetaminophen (TYLENOL) 500 MG tablet Take 500 mg by mouth as needed.      albuterol (PROVENTIL/VENTOLIN HFA) 90 mcg/actuation inhaler Inhale 2 puffs into the lungs every 6 (six) hours as needed for Wheezing. Rescue 18 g 12    aspirin 81 MG Chew Take 81 mg by mouth once daily.      blood sugar diagnostic (ACCU-CHEK HECTOR) Strp Uses Accu-Check Hector meter to test BG 4x/day 400 strip 6    carvediloL (COREG) 25 MG tablet TAKE 2 TABLETS (50 MG TOTAL) BY MOUTH 2 (TWO) TIMES DAILY. 360 tablet 3    cholecalciferol, vitamin D3, (VITAMIN D3) 50 mcg (2,000 unit) Tab Take 1 tablet by mouth once daily.       cyanocobalamin (VITAMIN B-12) 100 MCG tablet Take 100 mcg by mouth once daily.      dulaglutide (TRULICITY) 1.5 mg/0.5 mL pen injector Inject 1.5 mg into the skin every 7 days. 4 pen 11    epoetin [place]-epbx (RETACRIT) 10,000 unit/mL imjection Inject 1 mL (10,000 Units total) into the skin every 30 days. 1 mL 11    estradioL (ESTRACE) 0.01 % (0.1 mg/gram) vaginal cream Place 1 g vaginally every other day. 42.5 g 11    furosemide (LASIX) 40 MG tablet Take 1 tablet (40 mg total) by mouth once daily. 90 tablet 3    hydrALAZINE (APRESOLINE) 100 MG tablet Take 1 tablet (100 mg total) by mouth 2 (two) times daily. 90 tablet 3    HYDROcodone-acetaminophen (NORCO) 5-325 mg per tablet  "Take 1 tablet by mouth every 4 (four) hours as needed for Pain. 5 tablet 0    insulin (LANTUS SOLOSTAR U-100 INSULIN) glargine 100 units/mL SubQ pen Inject 10 Units into the skin every evening. 30 mL 3    lancets (ACCU-CHEK SOFTCLIX LANCETS) Misc Uses Accu-Chek Angelica meter to test BG 2x/day 400 each 6    montelukast (SINGULAIR) 10 mg tablet Take 1 tablet (10 mg total) by mouth once daily. 90 tablet 3    nitroGLYCERIN (NITROSTAT) 0.4 MG SL tablet Place 0.4 mg under the tongue every 5 (five) minutes as needed for Chest pain.       omega-3 fatty acids 500 mg Cap Take 1 capsule by mouth Daily.      pen needle, diabetic (BD ULTRA-FINE MINI PEN NEEDLE) 31 gauge x 3/16" Ndle Use with insulin twice a day. 400 each 3    polyethylene glycol (GLYCOLAX) 17 gram PwPk Take 17 g by mouth daily as needed (constipation). 10 each 1    pravastatin (PRAVACHOL) 40 MG tablet Take 1 tablet (40 mg total) by mouth once daily. 90 tablet 3    pulse oximeter (PULSE OXIMETER) device by Apply Externally route 2 (two) times a day. Use twice daily at 8 AM and 3 PM and record the value in [place] as directed. 1 each 0    senna-docusate 8.6-50 mg (PERICOLACE) 8.6-50 mg per tablet Take 1 tablet by mouth 2 (two) times daily.      sodium bicarbonate 650 MG tablet Take 2 tablets (1,300 mg total) by mouth 2 (two) times daily. 360 tablet 3    valsartan (DIOVAN) 80 MG tablet TAKE 1 TABLET BY MOUTH 2 TIMES DAILY. 180 tablet 3    [DISCONTINUED] albuterol-ipratropium (DUO-NEB) 2.5 mg-0.5 mg/3 mL nebulizer solution Take 3 mLs by nebulization every 4 (four) hours as needed for Wheezing. 270 mL 12    albuterol-ipratropium (DUO-NEB) 2.5 mg-0.5 mg/3 mL nebulizer solution Take 3 mLs by nebulization every 4 (four) hours as needed for Wheezing. 270 mL 12     Facility-Administered Encounter Medications as of 7/31/2023   Medication Dose Route Frequency Provider Last Rate Last Admin    fluorescein 500 mg/5 mL (10 %) injection 500 mg  5 mL Intravenous Once D. Jean " MD Geni         No orders of the defined types were placed in this encounter.      Plan:       Problem List Items Addressed This Visit          ENT    Seasonal allergies - Primary    Overview     Continue flonase              Pulmonary    Asthma, chronic    Overview     - continue Albuterol, Duonebs, Singulair, Flonase.  No longer on maintenance inhalers.  Much improved.         Multiple lung nodules    Overview     Overall burden today (7/31/23) seems to be Improved from previous evaluation (2/23).  Persistent LLL pleural based nodule vs rounded atelectasis that may warrant further imaging follow up.  I would not do so earlier than 6 months from now, based on clinical improvement and lack of constitutional symptoms.  - Will revisit repeat CT thorax in 6 months time at follow up.  This would be for re-evaluation of tree-in-bud opacities, mucous impaction, pleural effusions, and bandlike atelectasis.           Pleural effusion    Overview     Previously underwent thoracentesis on left in 7/2022.  Results were exudative, but negative cytology.  She continues to have the best breathing status and control of her heart failure that she has had since I have met her.  She is breathing well and content with current clinical condition.  Her CT thorax from 7/31/23 shows improvement in pleural fluid burden when compared to the CT from 2/2023.  I feel her current heart failure plan is working.  - continue PD, BP, and heart failure control as current as she is having fantastic results.            Cardiac/Vascular    Chronic combined systolic and diastolic heart failure    Overview     - continue optimization of BP and heart failure as possible.  Since starting PD, this seems to be much better controlled.            ID    ABPA (allergic bronchopulmonary aspergillosis)    Overview     Unable to take steroids due to hyperglycemia and recurrent heart failure.  Tried voriconazole with ID, but was unable to tolerate.  Continue  utilizing sputum clearance maneuvers and inhaler therapy for her asthma control as this is providing her with good effect.          Mycobacterium avium complex    Overview     Doing well with airway clearance maneuvers.  CT thorax on 7/31/23 showing improvement in tree in bud opacities.  She is not having weight loss of fevers/night sweats.  - she is clear to auscultation and having no sputum production at this time.  She is doing well with PRN duonebs.  I advised her that if her mucous burden returns, that I will provide her with 3% saline nebs to aid with clearance.           Other Visit Diagnoses       Chronic obstructive pulmonary disease, unspecified COPD type        Cough, unspecified type              6 month follow up or sooner PRN.  follow up CT at that time.    Kelvin Maki MD  [facility]

## 2023-08-01 ENCOUNTER — CLINICAL SUPPORT (OUTPATIENT)
Dept: CARDIOLOGY | Facility: HOSPITAL | Age: 83
End: 2023-08-01
Payer: MEDICARE

## 2023-08-01 DIAGNOSIS — Z95.810 PRESENCE OF AUTOMATIC (IMPLANTABLE) CARDIAC DEFIBRILLATOR: ICD-10-CM

## 2023-08-01 PROCEDURE — 93296 REM INTERROG EVL PM/IDS: CPT | Performed by: INTERNAL MEDICINE

## 2023-08-08 ENCOUNTER — CLINICAL SUPPORT (OUTPATIENT)
Dept: OTOLARYNGOLOGY | Facility: CLINIC | Age: 83
End: 2023-08-08
Payer: MEDICARE

## 2023-08-08 DIAGNOSIS — Z46.1 ENCOUNTER FOR FITTING AND ADJUSTMENT OF HEARING AID OF BOTH EARS: Primary | ICD-10-CM

## 2023-08-08 PROCEDURE — 99499 NO LOS: ICD-10-PCS | Mod: ,,, | Performed by: AUDIOLOGIST-HEARING AID FITTER

## 2023-08-08 PROCEDURE — 99499 UNLISTED E&M SERVICE: CPT | Mod: ,,, | Performed by: AUDIOLOGIST-HEARING AID FITTER

## 2023-08-08 NOTE — PROGRESS NOTES
Susannah Carter, CCC-A  Audiologist - [facility] [address] [address]  [email or web address]  [phone]    Patient: Myesha Quinones   MRN: [id number]  [address]  Home Phone [phone]   Work Phone [phone]   Mobile [phone]   : 1940  PELAYO: 2023      HEARING AID FOLLOW-UP      Myesha Quinones is here today with her son, Macho, to  her Left hearing aid from repair (new serial #[id number]).  Attached Left mold #[id number], changed out the HF3 filter with stock, and cleaned with wipe.  Connected to 23 session to verify the new serial number in the software.  Calibrated Left hearing aid only and saved without any other changes to settings.  Visual inspection shows the wire for her Right hearing aid is now coming out of her earmold (#[id number]).  Sent for remake with Right hearing aid #[id number] for in-warranty repair, citing good fit except for wire coming out, please maintain .  Patient has a backup earmold that she will use with a loaner hearing aid while hers is being repaired.  Discussed and signed loaner paperwork, then programmed #[id number] attached to Right mold #[id number].  Wiped mold and opened earlier session from today (23).  Verified proper serial number and acoustics (vent measured 2.4 mm).  Calibrated Right side only and then saved without any changes to settings.  Forgot previous devices from iPhone and then re-paired current devices to iPhone.  Verified worked in office for incoming/outgoing phone calls only, per patient request.  Myesha Quinones verbalized understanding and satisfaction with today's visit, stating that the hearing aids feel and sound good in her ears.  She will call if service is needed before her next appointment to  her Right hearing aid from repair and discuss her warranty coverage.    [employer]  #[id number] (R)  #[id number] (L)  Warranty  expires: 10/24/2024     _______________________________  Susannah Carter, [facility]-A  Audiologist

## 2023-08-15 ENCOUNTER — OFFICE VISIT (OUTPATIENT)
Dept: OTOLARYNGOLOGY | Facility: CLINIC | Age: 83
End: 2023-08-15
Payer: MEDICARE

## 2023-08-15 VITALS
BODY MASS INDEX: 22.44 KG/M2 | HEART RATE: 51 BPM | DIASTOLIC BLOOD PRESSURE: 72 MMHG | WEIGHT: 126.63 LBS | SYSTOLIC BLOOD PRESSURE: 150 MMHG

## 2023-08-15 DIAGNOSIS — H69.93 DYSFUNCTION OF BOTH EUSTACHIAN TUBES: Primary | Chronic | ICD-10-CM

## 2023-08-15 DIAGNOSIS — H91.90 HEARING LOSS, UNSPECIFIED HEARING LOSS TYPE, UNSPECIFIED LATERALITY: ICD-10-CM

## 2023-08-15 DIAGNOSIS — J31.0 CHRONIC RHINITIS: Chronic | ICD-10-CM

## 2023-08-15 DIAGNOSIS — Z97.4 WEARS HEARING AID IN BOTH EARS: ICD-10-CM

## 2023-08-15 PROCEDURE — 3288F PR FALLS RISK ASSESSMENT DOCUMENTED: ICD-10-PCS | Mod: CPTII,S$GLB,, | Performed by: OTOLARYNGOLOGY

## 2023-08-15 PROCEDURE — 3077F PR MOST RECENT SYSTOLIC BLOOD PRESSURE >= 140 MM HG: ICD-10-PCS | Mod: CPTII,S$GLB,, | Performed by: OTOLARYNGOLOGY

## 2023-08-15 PROCEDURE — 1160F RVW MEDS BY RX/DR IN RCRD: CPT | Mod: CPTII,S$GLB,, | Performed by: OTOLARYNGOLOGY

## 2023-08-15 PROCEDURE — 99213 PR OFFICE/OUTPT VISIT, EST, LEVL III, 20-29 MIN: ICD-10-PCS | Mod: S$GLB,,, | Performed by: OTOLARYNGOLOGY

## 2023-08-15 PROCEDURE — 1126F PR PAIN SEVERITY QUANTIFIED, NO PAIN PRESENT: ICD-10-PCS | Mod: CPTII,S$GLB,, | Performed by: OTOLARYNGOLOGY

## 2023-08-15 PROCEDURE — 3078F DIAST BP <80 MM HG: CPT | Mod: CPTII,S$GLB,, | Performed by: OTOLARYNGOLOGY

## 2023-08-15 PROCEDURE — 1160F PR REVIEW ALL MEDS BY PRESCRIBER/CLIN PHARMACIST DOCUMENTED: ICD-10-PCS | Mod: CPTII,S$GLB,, | Performed by: OTOLARYNGOLOGY

## 2023-08-15 PROCEDURE — 3078F PR MOST RECENT DIASTOLIC BLOOD PRESSURE < 80 MM HG: ICD-10-PCS | Mod: CPTII,S$GLB,, | Performed by: OTOLARYNGOLOGY

## 2023-08-15 PROCEDURE — 99999 PR PBB SHADOW E&M-EST. PATIENT-LVL V: CPT | Mod: PBBFAC,,, | Performed by: OTOLARYNGOLOGY

## 2023-08-15 PROCEDURE — 1159F MED LIST DOCD IN RCRD: CPT | Mod: CPTII,S$GLB,, | Performed by: OTOLARYNGOLOGY

## 2023-08-15 PROCEDURE — 1101F PR PT FALLS ASSESS DOC 0-1 FALLS W/OUT INJ PAST YR: ICD-10-PCS | Mod: CPTII,S$GLB,, | Performed by: OTOLARYNGOLOGY

## 2023-08-15 PROCEDURE — 99999 PR PBB SHADOW E&M-EST. PATIENT-LVL V: ICD-10-PCS | Mod: PBBFAC,,, | Performed by: OTOLARYNGOLOGY

## 2023-08-15 PROCEDURE — 3077F SYST BP >= 140 MM HG: CPT | Mod: CPTII,S$GLB,, | Performed by: OTOLARYNGOLOGY

## 2023-08-15 PROCEDURE — 1126F AMNT PAIN NOTED NONE PRSNT: CPT | Mod: CPTII,S$GLB,, | Performed by: OTOLARYNGOLOGY

## 2023-08-15 PROCEDURE — 99213 OFFICE O/P EST LOW 20 MIN: CPT | Mod: S$GLB,,, | Performed by: OTOLARYNGOLOGY

## 2023-08-15 PROCEDURE — 3288F FALL RISK ASSESSMENT DOCD: CPT | Mod: CPTII,S$GLB,, | Performed by: OTOLARYNGOLOGY

## 2023-08-15 PROCEDURE — 1101F PT FALLS ASSESS-DOCD LE1/YR: CPT | Mod: CPTII,S$GLB,, | Performed by: OTOLARYNGOLOGY

## 2023-08-15 PROCEDURE — 1159F PR MEDICATION LIST DOCUMENTED IN MEDICAL RECORD: ICD-10-PCS | Mod: CPTII,S$GLB,, | Performed by: OTOLARYNGOLOGY

## 2023-08-15 RX ORDER — FLUTICASONE PROPIONATE 50 MCG
1 SPRAY, SUSPENSION (ML) NASAL 2 TIMES DAILY
Qty: 11.1 ML | Refills: 3 | Status: SHIPPED | OUTPATIENT
Start: 2023-08-15 | End: 2023-11-13

## 2023-08-15 NOTE — PATIENT INSTRUCTIONS
Continue nasal spray for ETD.  Continue hearing aid use.      Follow up in 6 mos and needs annual audiogram in next few months.

## 2023-08-15 NOTE — PROGRESS NOTES
Chief Complaint   Patient presents with    Follow-up       HPI:  Patient is a 83 y.o. female who has previously seen me for Eustachian tube dysfunction, mixed hearing loss, chronic suppurative otitis media.    At last visit in February and March patient had otorrhea and TM per for on the right which improved after treatment.  She presents today for follow-up.      Since the last visit, the patient reports no new problems.  She has not had ear drainage or pain.  She is using her hearing aids well and sees Dr. Carla Garces at [facility] for hearing aid fitting/repair.    Active Ambulatory Problems     Diagnosis Date Noted    History of nonmelanoma skin cancer 06/28/2012    LBBB (left bundle branch block) 07/25/2012    Nontoxic multinodular goiter 08/24/2012    Meningioma 03/14/2013    Asthma, chronic 06/05/2013    Biventricular ICD (implantable cardioverter-defibrillator) in place 01/09/2014    Dyspnea 06/30/2014    Tremor 07/22/2014    Hypertension associated with diabetes 07/20/2015    History of breast cancer 02/02/2016    Adrenal cortical nodule: repeat CT 6/2016 03/16/2016    Calcification of aorta 03/16/2016    Diabetic polyneuropathy associated with type 2 diabetes mellitus 03/16/2016    Osteoporosis 01/26/2017    KHOA (obstructive sleep apnea) 01/26/2017    Iron deficiency anemia 05/16/2017    Chemotherapy-induced neuropathy 05/18/2017    Hypertensive retinopathy, bilateral 02/22/2018    Multiple lung nodules 04/04/2018    Chronic obstructive pulmonary disease with acute exacerbation 04/04/2018    Mixed conductive and sensorineural hearing loss 04/04/2018    Weakness     Metabolic bone disease 01/21/2019    Seasonal allergies 06/28/2019    Hyperlipidemia associated with type 2 diabetes mellitus 07/27/2020    Mild nonproliferative retinopathy due to secondary diabetes 07/27/2020    Macular edema due to secondary diabetes 07/27/2020    Moderate asthma 10/01/2020    Controlled type 2 diabetes mellitus with both eyes  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 macular edema, with long-term current use of insulin 02/22/2018    Chronic combined systolic and diastolic heart failure 03/23/2021    Obstructive sleep apnea 07/16/2021    Personal history of COVID-19 07/23/2021    Current mild episode of major depressive disorder without prior episode 01/25/2022    Microalbuminuria due to type 2 diabetes mellitus 01/26/2022    Urinary retention 02/28/2022    Hyponatremia 04/02/2022    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    Mycobacterium avium complex     Diet-controlled diabetes mellitus 04/26/2022    History of non anemic vitamin B12 deficiency 04/26/2022    Vitreous hemorrhage, left 02/22/2018    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    Acute on chronic combined systolic and diastolic CHF (congestive heart failure) 06/20/2022    COVID-19 virus infection 06/20/2022    Essential hypertension 06/20/2022    Type 2 diabetes mellitus, with long-term current use of insulin 06/20/2022    Acute hypoxemic respiratory failure 06/21/2022    UTI (urinary tract infection) 06/21/2022    ACP (advance care planning) 07/08/2022    HAP (hospital-acquired pneumonia) 07/08/2022    CKD (chronic kidney disease) stage 4, GFR 15-29 ml/min 07/08/2022    Pleural effusion     Pneumonia of left lower lobe due to Pseudomonas species 07/14/2022    Type 2 diabetes mellitus with stage 3b chronic kidney disease, with long-term current use of insulin 10/07/2022    Chronic kidney disease (CKD), stage V 04/06/2023    CKD (chronic kidney disease), stage V 04/25/2023     Resolved Ambulatory Problems     Diagnosis Date Noted    AK (actinic keratosis) 06/28/2012    Nonischemic cardiomyopathy 07/25/2012    Chronic systolic heart failure 07/26/2012    Type II or unspecified type diabetes mellitus with neurological manifestations, uncontrolled 08/24/2012    Senile osteopenia 08/24/2012    Defibrillator discharge 12/20/2012    Breast cancer  01/23/2013    Ventricular tachycardia 01/29/2013    Osteoporosis, post-menopausal 04/02/2013    Senile osteopenia 04/02/2013    Coronary artery disease involving native coronary artery without angina pectoris - Non-obstructive 50% LAD 07/20/2015    DM (diabetes mellitus), type 2, uncontrolled w/neurologic complication 01/07/2016    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    Uncontrolled type 2 diabetes mellitus with hyperglycemia 11/09/2016    Type 2 diabetes mellitus with stage 3 chronic kidney disease, with long-term current use of insulin 01/26/2017    Chronic kidney disease, stage 3, mod decreased GFR 02/14/2017    Hypomagnesemia 08/31/2017    Numbness     ICD (implantable cardioverter-defibrillator) battery depletion 12/17/2018    Type 2 DM with CKD stage 3 and hypertension 01/21/2019    Cardiomyopathy, ischemic 01/21/2019    Proteinuria 01/21/2019    Colon cancer screening 11/05/2019    Pseudomonas pneumonia 12/07/2019    Acute renal failure superimposed on stage 4 chronic kidney disease 12/07/2019    Acute right-sided thoracic back pain 12/09/2019    CAP (community acquired pneumonia) due to Pseudomonas species 12/17/2019    Rib fracture 12/17/2019    Parapneumonic effusion 12/18/2019    Acute respiratory failure with hypoxia and hypercarbia 12/19/2019    Overweight (BMI 25.0-29.9) 07/27/2020    Pneumonia due to COVID-19 virus 12/06/2020    Hypoxia 03/23/2021    Hyperlipidemia 03/23/2021    Pneumonia of both lungs due to infectious organism 03/23/2021    Hypertensive emergency 03/23/2021    Chronic combined systolic and diastolic heart failure 04/06/2021    Chronic bronchitis, unspecified chronic bronchitis type 01/25/2022    White coat syndrome with diagnosis of hypertension 02/11/2022    Acute hyponatremia 02/24/2022    Metabolic acidosis 02/24/2022    Cough 03/03/2022    Hypothermia 04/02/2022    Acute pyelonephritis 04/02/2022    Sepsis 04/02/2022    Hyperosmolar hyperglycemic state (HHS)  06/22/2022     Past Medical History:   Diagnosis Date    Allergy     Asthma     Basal cell carcinoma     Basal cell carcinoma     Basal cell carcinoma 05/20/2015    Basal cell carcinoma 12/22/2015    Basal cell carcinoma 12/03/2019    Basal cell carcinoma of right ala nasi 10/20/2022    Bilateral pleural effusion     Bilateral renal cysts     CAD (coronary artery disease)     Cardiomyopathy     Cataract     CHF (congestive heart failure)     Colon polyp 2011    COPD (chronic obstructive pulmonary disease)     COPD exacerbation 04/08/2018    Diabetes mellitus     Diabetes mellitus type II     Diabetes with neurologic complications     Edema     ESRD needing dialysis     Goiter     Hematuria, unspecified     HX: breast cancer     Hypertension     Hypocalcemia     Hypokalemia     Left kidney mass     Osteoporosis, postmenopausal     Pneumonia 12/08/2019    Skin cancer     Sleep apnea     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/2015    Unspecified vitamin D deficiency     Vitamin B12 deficiency     Vitamin D deficiency disease        Review of Systems  General: negative for chills, fever or weight loss  Psychological: negative for mood changes or depression  Ophthalmic: negative for blurry vision, photophobia or eye pain  ENT: see HPI  Respiratory: no cough, shortness of breath, or wheezing  Cardiovascular: no chest pain or dyspnea on exertion  Gastrointestinal: no abdominal pain, change in bowel habits, or black/ bloody stools  Musculoskeletal: negative for gait disturbance or muscular weakness  Neurological: no syncope or seizures; no ataxia  Dermatological: negative for pruritis, rash and jaundice  Hematologic/lymphatic: no easy bruising, no new adenopathy    Physical Exam     Vitals:    08/15/23 1116   BP: (!) 150/72   Pulse: (!) 51         Constitutional:   She is oriented to person, place, and time. Vital signs are normal. She appears well-developed and well-nourished. She appears alert. She is cooperative. Normal speech.       Head:  Normocephalic and atraumatic. Salivary glands normal.  Facial strength is normal.      Ears:    Right Ear: Tympanic membrane is scarred and retracted. Tympanic membrane is not perforated. Tympanic membrane mobility is abnormal. No middle ear effusion.   Left Ear: Tympanic membrane is retracted. Tympanic membrane is not perforated and not erythematous. Tympanic membrane mobility is abnormal.  No middle ear effusion.     Nose:  Mucosal edema present. No rhinorrhea, septal deviation or polyps. Turbinates abnormal and turbinate hypertrophy (3+, boggy, congested mucosa).  Right sinus exhibits no maxillary sinus tenderness and no frontal sinus tenderness. Left sinus exhibits no maxillary sinus tenderness and no frontal sinus tenderness.     Mouth/Throat  Oropharynx clear and moist without lesions or asymmetry, lips, teeth, and gums normal and oropharynx normal. No mucous membrane lesions. No oropharyngeal exudate, posterior oropharyngeal edema or posterior oropharyngeal erythema. Mirror exam not performed due to patient tolerance.  Mirror exam not performed due to patient tolerance.      Neck:  Neck normal without thyromegaly masses, asymmetry, normal tracheal structure, crepitus, and tenderness, thyroid normal, trachea normal, phonation normal, full range of motion with neck supple and no adenopathy. No JVD present. Carotid bruit is not present. No thyroid mass and no thyromegaly present.     She has no cervical adenopathy.     Cardiovascular:    Normal rate, regular rhythm and rate and rhythm, heart sounds, and pulses normal.              Pulmonary/Chest:   Effort and breath sounds normal.     Psychiatric:   She has a normal mood and affect. Her speech is normal and behavior is normal.     Neurological:   She is alert and oriented to person, place, and time. She has neurological normal, alert and oriented. No cranial nerve deficit.     Skin:   No abrasions, lacerations, lesions, or rashes.          Assessment/Plan:      ICD-10-CM ICD-9-CM    1. Dysfunction of both eustachian tubes  H69.83 381.81       2. Chronic rhinitis  J31.0 472.0       3. Hearing loss, unspecified hearing loss type, unspecified laterality  H91.90 389.9       4. Wears hearing aid in both ears  Z97.4 [id number]             Continue Flonase.  Prescription refilled.    Follow-up in 6 months or sooner if needed.  Due for annual audiogram at the end of 2023.    January Cao MD  [facility]

## 2023-08-21 PROBLEM — J15.1 PNEUMONIA OF LEFT LOWER LOBE DUE TO PSEUDOMONAS SPECIES: Status: RESOLVED | Noted: 2022-07-14 | Resolved: 2023-08-21

## 2023-08-21 PROBLEM — Z79.4 TYPE 2 DIABETES MELLITUS WITH STAGE 3B CHRONIC KIDNEY DISEASE, WITH LONG-TERM CURRENT USE OF INSULIN: Status: RESOLVED | Noted: 2022-10-07 | Resolved: 2023-08-21
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
## 2023-08-21 PROBLEM — U07.1 COVID-19 VIRUS INFECTION: Status: RESOLVED | Noted: 2022-06-20 | Resolved: 2023-08-21

## 2023-08-21 PROBLEM — Z71.89 ACP (ADVANCE CARE PLANNING): Status: RESOLVED | Noted: 2022-07-08 | Resolved: 2023-08-21

## 2023-08-21 PROBLEM — N18.32 TYPE 2 DIABETES MELLITUS WITH STAGE 3B CHRONIC KIDNEY DISEASE, WITH LONG-TERM CURRENT USE OF INSULIN: Status: RESOLVED | Noted: 2022-10-07 | Resolved: 2023-08-21

## 2023-08-21 PROBLEM — Z86.16 PERSONAL HISTORY OF COVID-19: Status: RESOLVED | Noted: 2021-07-23 | Resolved: 2023-08-21

## 2023-08-21 PROBLEM — G47.33 OSA (OBSTRUCTIVE SLEEP APNEA): Status: RESOLVED | Noted: 2017-01-26 | Resolved: 2023-08-21

## 2023-08-21 PROBLEM — I50.43 ACUTE ON CHRONIC COMBINED SYSTOLIC AND DIASTOLIC CHF (CONGESTIVE HEART FAILURE): Status: RESOLVED | Noted: 2022-06-20 | Resolved: 2023-08-21

## 2023-08-21 PROBLEM — E11.22 TYPE 2 DIABETES MELLITUS WITH STAGE 3B CHRONIC KIDNEY DISEASE, WITH LONG-TERM CURRENT USE OF INSULIN: Status: RESOLVED | Noted: 2022-10-07 | Resolved: 2023-08-21

## 2023-08-21 PROBLEM — E11.9 TYPE 2 DIABETES MELLITUS, WITH LONG-TERM CURRENT USE OF INSULIN: Status: RESOLVED | Noted: 2022-06-20 | Resolved: 2023-08-21

## 2023-08-21 PROBLEM — F33.9 RECURRENT MAJOR DEPRESSION: Status: ACTIVE | Noted: 2022-01-25

## 2023-08-21 PROBLEM — E11.9 DIET-CONTROLLED DIABETES MELLITUS: Status: RESOLVED | Noted: 2022-04-26 | Resolved: 2023-08-21

## 2023-08-21 PROBLEM — N18.5 CHRONIC KIDNEY DISEASE (CKD), STAGE V: Status: RESOLVED | Noted: 2023-04-06 | Resolved: 2023-08-21

## 2023-08-21 PROBLEM — J18.9 HAP (HOSPITAL-ACQUIRED PNEUMONIA): Status: RESOLVED | Noted: 2022-07-08 | Resolved: 2023-08-21

## 2023-08-21 PROBLEM — Z79.4 TYPE 2 DIABETES MELLITUS, WITH LONG-TERM CURRENT USE OF INSULIN: Status: RESOLVED | Noted: 2022-06-20 | Resolved: 2023-08-21

## 2023-08-21 PROBLEM — M85.859 OSTEOPENIA OF NECK OF FEMUR: Status: ACTIVE | Noted: 2017-01-26

## 2023-08-21 PROBLEM — N18.5 CKD (CHRONIC KIDNEY DISEASE), STAGE V: Status: RESOLVED | Noted: 2023-04-25 | Resolved: 2023-08-21

## 2023-08-21 PROBLEM — J96.01 ACUTE HYPOXEMIC RESPIRATORY FAILURE: Status: RESOLVED | Noted: 2022-06-21 | Resolved: 2023-08-21

## 2023-08-21 RX ORDER — LISINOPRIL 5 MG/1
5 TABLET ORAL
COMMUNITY
Start: 2023-06-30 | End: 2023-08-21

## 2023-08-21 NOTE — PROGRESS NOTES
Myesha Quinones presented for a  Medicare AWV and comprehensive Health Risk Assessment today. The following components were reviewed and updated:    Medical history  Family History  Social history  Allergies and Current Medications  Health Risk Assessment  Health Maintenance  Care Team         ** See Completed Assessments for Annual Wellness Visit within the encounter summary.**         The following assessments were completed:  Living Situation  CAGE  Depression Screening  Timed Get Up and Go  Whisper Test  Cognitive Function Screening        Nutrition Screening  ADL Screening  PAQ Screening      Review for Opioid Screening: Pt does not have Rx for Opioids      Review for Substance Use Disorders: Patient does not use substance        Current Outpatient Medications:     acetaminophen (TYLENOL) 500 MG tablet, Take 500 mg by mouth as needed., Disp: , Rfl:     albuterol (PROVENTIL/VENTOLIN HFA) 90 mcg/actuation inhaler, Inhale 2 puffs into the lungs every 6 (six) hours as needed for Wheezing. Rescue, Disp: 18 g, Rfl: 12    albuterol-ipratropium (DUO-NEB) 2.5 mg-0.5 mg/3 mL nebulizer solution, Take 3 mLs by nebulization every 4 (four) hours as needed for Wheezing., Disp: 270 mL, Rfl: 12    aspirin 81 MG Chew, Take 81 mg by mouth once daily., Disp: , Rfl:     blood sugar diagnostic (ACCU-CHEK HECTOR) Strp, Uses Accu-Check Hector meter to test BG 4x/day, Disp: 400 strip, Rfl: 6    carvediloL (COREG) 25 MG tablet, TAKE 2 TABLETS (50 MG TOTAL) BY MOUTH 2 (TWO) TIMES DAILY., Disp: 360 tablet, Rfl: 3    cholecalciferol, vitamin D3, (VITAMIN D3) 50 mcg (2,000 unit) Tab, Take 1 tablet by mouth once daily. , Disp: , Rfl:     cyanocobalamin (VITAMIN B-12) 100 MCG tablet, Take 100 mcg by mouth once daily., Disp: , Rfl:     dulaglutide (TRULICITY) 1.5 mg/0.5 mL pen injector, Inject 1.5 mg into the skin every 7 days., Disp: 4 pen, Rfl: 11    epoetin [place]-epbx (RETACRIT) 10,000 unit/mL imjection, Inject 1 mL (10,000 Units total)  "into the skin every 30 days., Disp: 1 mL, Rfl: 11    estradioL (ESTRACE) 0.01 % (0.1 mg/gram) vaginal cream, Place 1 g vaginally every other day., Disp: 42.5 g, Rfl: 11    fluticasone propionate (FLONASE) 50 mcg/actuation nasal spray, 1 spray (50 mcg total) by Each Nostril route 2 (two) times a day., Disp: 11.1 mL, Rfl: 3    furosemide (LASIX) 40 MG tablet, Take 1 tablet (40 mg total) by mouth once daily., Disp: 90 tablet, Rfl: 3    hydrALAZINE (APRESOLINE) 100 MG tablet, Take 1 tablet (100 mg total) by mouth 2 (two) times daily., Disp: 90 tablet, Rfl: 3    insulin (LANTUS SOLOSTAR U-100 INSULIN) glargine 100 units/mL SubQ pen, Inject 10 Units into the skin every evening., Disp: 30 mL, Rfl: 3    lancets (ACCU-CHEK SOFTCLIX LANCETS) Misc, Uses Accu-Chek Angelica meter to test BG 2x/day, Disp: 400 each, Rfl: 6    lisinopriL (PRINIVIL,ZESTRIL) 5 MG tablet, Take 5 mg by mouth once daily., Disp: , Rfl:     montelukast (SINGULAIR) 10 mg tablet, Take 1 tablet (10 mg total) by mouth once daily., Disp: 90 tablet, Rfl: 3    nitroGLYCERIN (NITROSTAT) 0.4 MG SL tablet, Place 0.4 mg under the tongue every 5 (five) minutes as needed for Chest pain. , Disp: , Rfl:     omega-3 fatty acids 500 mg Cap, Take 1 capsule by mouth Daily., Disp: , Rfl:     pen needle, diabetic (BD ULTRA-FINE MINI PEN NEEDLE) 31 gauge x 3/16" Ndle, Use with insulin twice a day., Disp: 400 each, Rfl: 3    pravastatin (PRAVACHOL) 40 MG tablet, Take 1 tablet (40 mg total) by mouth once daily., Disp: 90 tablet, Rfl: 3    sodium bicarbonate 650 MG tablet, Take 2 tablets (1,300 mg total) by mouth 2 (two) times daily., Disp: 360 tablet, Rfl: 3    valsartan (DIOVAN) 80 MG tablet, TAKE 1 TABLET BY MOUTH 2 TIMES DAILY., Disp: 180 tablet, Rfl: 3    Current Facility-Administered Medications:     fluorescein 500 mg/5 mL (10 %) injection 500 mg, 5 mL, Intravenous, Once, CARRIE Arechiga MD       Vitals:    08/22/23 1100   BP: 124/66   Pulse: 87   SpO2: 97%   Weight: " "59.7 kg (131 lb 9.8 oz)   Height: 5' 3" (1.6 m)   PainSc: 0-No pain      Physical Exam  Vitals and nursing note reviewed.   Constitutional:       General: She is not in acute distress.     Appearance: Normal appearance. She is not ill-appearing.   HENT:      Head: Normocephalic and atraumatic.      Ears:      Comments: Bilat hearing aids   Eyes:      General: No scleral icterus.        Right eye: No discharge.         Left eye: No discharge.      Extraocular Movements: Extraocular movements intact.      Conjunctiva/sclera: Conjunctivae normal.      Comments: +glasses   Cardiovascular:      Rate and Rhythm: Normal rate.   Pulmonary:      Effort: Pulmonary effort is normal.   Abdominal:      Comments: +PD dialysis catheter   Musculoskeletal:      Cervical back: Normal range of motion.      Right lower leg: No edema.      Left lower leg: No edema.   Skin:     General: Skin is warm and dry.      Findings: Bruising (bilat arms) present. No rash.   Neurological:      Mental Status: She is alert and oriented to person, place, and time.   Psychiatric:         Mood and Affect: Mood normal.         Behavior: Behavior normal. Behavior is cooperative.         Cognition and Memory: Cognition and memory normal.               Diagnoses and health risks identified today and associated recommendations/orders:    1. Encounter for preventive health examination  - Chart reviewed. Problem list updated. Discussed current medical diagnosis, current medications, medical/surgical/family/social history; updated provider list; documented vital signs; identified any cognitive impairment; and updated risk factor list. Addressed any outstanding health maintenance. Provided patient with personalized health advice. Continue to follow up with PCP and any specialists.       2. Major depression, recurrent, chronic  Chronic; stable on current treatment plan; follow up with PCP    3. Type 2 diabetes mellitus with stage 4 chronic kidney disease and " hypertension  Chronic; stable on current treatment plan; follow up with PCP  - follow up with Nephrology   - currently requiring Peritoneal Dialysis 2xweek    4. Chronic obstructive pulmonary disease, unspecified COPD type  Chronic; stable on current treatment plan; follow up with PCP  - follow up with pulmonology     5. Meningioma  Chronic; stable on current treatment plan; follow up with PCP    6. Adrenal cortical nodule: repeat CT 6/2016  Chronic; stable on current treatment plan; follow up with PCP    7. Calcification of aorta  Chronic; stable on current treatment plan; follow up with PCP  - taking statin    8. Hyperlipidemia associated with type 2 diabetes mellitus  Chronic; stable on current treatment plan; follow up with PCP  - taking statin    9. Dependence on renal dialysis  Chronic; stable on current treatment plan; follow up with PCP  - follow up with Nephrology   - currently requiring Peritoneal Dialysis 2xweek    10. Purpura  Chronic; stable on current treatment plan; follow up with PCP   -bruising to bilat forearms    11. 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, with long-term current use of insulin  Chronic; stable on current treatment plan; follow up with PCP-   Follow up with ophthalmology     12. Diabetic polyneuropathy associated with type 2 diabetes mellitus  Chronic; stable on current treatment plan; follow up with PCP    13. Macular edema due to secondary diabetes  Chronic; stable on current treatment plan; follow up with PCP   -follow up with ophthalmology     14. Chronic combined systolic and diastolic heart failure  Chronic; stable on current treatment plan; follow up with PCP   -follow up with cardiology     15. Mycobacterium avium complex  Chronic; stable on current treatment plan; follow up with PCP  - follow up with pulmonology and infectious disease     16. Microalbuminuria due to type 2 diabetes  mellitus  Chronic; stable on current treatment plan; follow up with PCP   -follow up with nephrology    17. Mixed conductive and sensorineural hearing loss, unspecified laterality  Chronic; stable on current treatment plan; follow up with PCP  - has bilat hearing aids     18. Obstructive sleep apnea  Chronic; stable on current treatment plan; follow up with PCP    19. Osteopenia of neck of femur, unspecified laterality  Chronic; stable on current treatment plan; follow up with PCP  - declined dexa bone density  - taking vit d supplements     20. Coronary artery disease involving native coronary artery of native heart without angina pectoris  Chronic; stable on current treatment plan; follow up with PCP   -taking statin  - follow up with cardiology     21. Encounter for Medicare annual wellness exam  - Ambulatory Referral/Consult to Enhanced Annual Wellness Visit (eAWV)    22. BMI 23.0-23.9, adult  - Recommendation for healthy diet and increasing exercise as tolerated with goal of 150min/week      Provided Myesha with a 5-10 year written screening schedule and personal prevention plan. Recommendations were developed using the USPSTF age appropriate recommendations. Education, counseling, and referrals were provided as needed. After Visit Summary printed and given to patient which includes a list of additional screenings\tests needed.    Follow up in about 1 year (around 8/22/2024) for your next annual wellness visit.    Linda Pang, SRINIP-C    Advance Care Planning     I offered to discuss advanced care planning, including how to pick a person who would make decisions for you if you were unable to make them for yourself, called a health care power of , and what kind of decisions you might make such as use of life sustaining treatments such as ventilators and tube feeding when faced with a life limiting illness recorded on a living will that they will need to know. (How you want to be cared for as you  near the end of your natural life)     X  Patient has advanced directives written and agrees to provide copies to the institution.

## 2023-08-22 ENCOUNTER — OFFICE VISIT (OUTPATIENT)
Dept: FAMILY MEDICINE | Facility: CLINIC | Age: 83
End: 2023-08-22
Payer: MEDICARE

## 2023-08-22 VITALS
SYSTOLIC BLOOD PRESSURE: 124 MMHG | HEIGHT: 63 IN | OXYGEN SATURATION: 97 % | BODY MASS INDEX: 23.32 KG/M2 | DIASTOLIC BLOOD PRESSURE: 66 MMHG | HEART RATE: 87 BPM | WEIGHT: 131.63 LBS

## 2023-08-22 DIAGNOSIS — E11.29 MICROALBUMINURIA DUE TO TYPE 2 DIABETES MELLITUS: ICD-10-CM

## 2023-08-22 DIAGNOSIS — E27.8 ADRENAL CORTICAL NODULE: ICD-10-CM

## 2023-08-22 DIAGNOSIS — Z00.00 ENCOUNTER FOR PREVENTIVE HEALTH EXAMINATION: Primary | ICD-10-CM

## 2023-08-22 DIAGNOSIS — G47.33 OBSTRUCTIVE SLEEP APNEA: ICD-10-CM

## 2023-08-22 DIAGNOSIS — J44.9 CHRONIC OBSTRUCTIVE PULMONARY DISEASE, UNSPECIFIED COPD TYPE: ICD-10-CM

## 2023-08-22 DIAGNOSIS — M85.859 OSTEOPENIA OF NECK OF FEMUR, UNSPECIFIED LATERALITY: ICD-10-CM

## 2023-08-22 DIAGNOSIS — E11.22 TYPE 2 DIABETES MELLITUS WITH STAGE 4 CHRONIC KIDNEY DISEASE AND HYPERTENSION: ICD-10-CM

## 2023-08-22 DIAGNOSIS — Z79.4 CONTROLLED TYPE 2 DIABETES MELLITUS WITH BOTH EYES AFFECTED BY MILD NONPROLIFERATIVE RETINOPATHY AND MACULAR EDEMA, WITH LONG-TERM CURRENT USE OF INSULIN: ICD-10-CM

## 2023-08-22 DIAGNOSIS — Z99.2 DEPENDENCE ON RENAL DIALYSIS: ICD-10-CM

## 2023-08-22 DIAGNOSIS — E78.5 HYPERLIPIDEMIA ASSOCIATED WITH TYPE 2 DIABETES MELLITUS: ICD-10-CM

## 2023-08-22 DIAGNOSIS — I25.10 CORONARY ARTERY DISEASE INVOLVING NATIVE CORONARY ARTERY OF NATIVE HEART WITHOUT ANGINA PECTORIS: ICD-10-CM

## 2023-08-22 DIAGNOSIS — N18.4 TYPE 2 DIABETES MELLITUS WITH STAGE 4 CHRONIC KIDNEY DISEASE AND HYPERTENSION: ICD-10-CM

## 2023-08-22 DIAGNOSIS — H90.8 MIXED CONDUCTIVE AND SENSORINEURAL HEARING LOSS, UNSPECIFIED LATERALITY: ICD-10-CM

## 2023-08-22 DIAGNOSIS — D32.9 MENINGIOMA: ICD-10-CM

## 2023-08-22 DIAGNOSIS — Z00.00 ENCOUNTER FOR MEDICARE ANNUAL WELLNESS EXAM: ICD-10-CM

## 2023-08-22 DIAGNOSIS — E13.311 MACULAR EDEMA DUE TO SECONDARY DIABETES: ICD-10-CM

## 2023-08-22 DIAGNOSIS — E11.69 HYPERLIPIDEMIA ASSOCIATED WITH TYPE 2 DIABETES MELLITUS: ICD-10-CM

## 2023-08-22 DIAGNOSIS — R80.9 MICROALBUMINURIA DUE TO TYPE 2 DIABETES MELLITUS: ICD-10-CM

## 2023-08-22 DIAGNOSIS — I12.9 TYPE 2 DIABETES MELLITUS WITH STAGE 4 CHRONIC KIDNEY DISEASE AND HYPERTENSION: ICD-10-CM

## 2023-08-22 DIAGNOSIS — D69.2 PURPURA: ICD-10-CM

## 2023-08-22 DIAGNOSIS — E11.42 DIABETIC POLYNEUROPATHY ASSOCIATED WITH TYPE 2 DIABETES MELLITUS: ICD-10-CM

## 2023-08-22 DIAGNOSIS — A31.0 MYCOBACTERIUM AVIUM COMPLEX: ICD-10-CM

## 2023-08-22 DIAGNOSIS — F33.9 MAJOR DEPRESSION, RECURRENT, CHRONIC: ICD-10-CM

## 2023-08-22 DIAGNOSIS — I50.42 CHRONIC COMBINED SYSTOLIC AND DIASTOLIC HEART FAILURE: ICD-10-CM

## 2023-08-22 DIAGNOSIS — E11.3213 CONTROLLED TYPE 2 DIABETES MELLITUS WITH BOTH EYES AFFECTED BY MILD NONPROLIFERATIVE RETINOPATHY AND MACULAR EDEMA, WITH LONG-TERM CURRENT USE OF INSULIN: ICD-10-CM

## 2023-08-22 DIAGNOSIS — I70.0 CALCIFICATION OF AORTA: ICD-10-CM

## 2023-08-22 PROCEDURE — 3288F PR FALLS RISK ASSESSMENT DOCUMENTED: ICD-10-PCS | Mod: CPTII,S$GLB,, | Performed by: NURSE PRACTITIONER

## 2023-08-22 PROCEDURE — 3078F DIAST BP <80 MM HG: CPT | Mod: CPTII,S$GLB,, | Performed by: NURSE PRACTITIONER

## 2023-08-22 PROCEDURE — 1101F PR PT FALLS ASSESS DOC 0-1 FALLS W/OUT INJ PAST YR: ICD-10-PCS | Mod: CPTII,S$GLB,, | Performed by: NURSE PRACTITIONER

## 2023-08-22 PROCEDURE — 1160F RVW MEDS BY RX/DR IN RCRD: CPT | Mod: CPTII,S$GLB,, | Performed by: NURSE PRACTITIONER

## 2023-08-22 PROCEDURE — 1126F PR PAIN SEVERITY QUANTIFIED, NO PAIN PRESENT: ICD-10-PCS | Mod: CPTII,S$GLB,, | Performed by: NURSE PRACTITIONER

## 2023-08-22 PROCEDURE — 1160F PR REVIEW ALL MEDS BY PRESCRIBER/CLIN PHARMACIST DOCUMENTED: ICD-10-PCS | Mod: CPTII,S$GLB,, | Performed by: NURSE PRACTITIONER

## 2023-08-22 PROCEDURE — 1101F PT FALLS ASSESS-DOCD LE1/YR: CPT | Mod: CPTII,S$GLB,, | Performed by: NURSE PRACTITIONER

## 2023-08-22 PROCEDURE — 99999 PR PBB SHADOW E&M-EST. PATIENT-LVL V: ICD-10-PCS | Mod: PBBFAC,,, | Performed by: NURSE PRACTITIONER

## 2023-08-22 PROCEDURE — 1159F MED LIST DOCD IN RCRD: CPT | Mod: CPTII,S$GLB,, | Performed by: NURSE PRACTITIONER

## 2023-08-22 PROCEDURE — 3288F FALL RISK ASSESSMENT DOCD: CPT | Mod: CPTII,S$GLB,, | Performed by: NURSE PRACTITIONER

## 2023-08-22 PROCEDURE — 99999 PR PBB SHADOW E&M-EST. PATIENT-LVL V: CPT | Mod: PBBFAC,,, | Performed by: NURSE PRACTITIONER

## 2023-08-22 PROCEDURE — 1170F FXNL STATUS ASSESSED: CPT | Mod: CPTII,S$GLB,, | Performed by: NURSE PRACTITIONER

## 2023-08-22 PROCEDURE — 1170F PR FUNCTIONAL STATUS ASSESSED: ICD-10-PCS | Mod: CPTII,S$GLB,, | Performed by: NURSE PRACTITIONER

## 2023-08-22 PROCEDURE — G0439 PPPS, SUBSEQ VISIT: HCPCS | Mod: S$GLB,,, | Performed by: NURSE PRACTITIONER

## 2023-08-22 PROCEDURE — 3074F SYST BP LT 130 MM HG: CPT | Mod: CPTII,S$GLB,, | Performed by: NURSE PRACTITIONER

## 2023-08-22 PROCEDURE — 1159F PR MEDICATION LIST DOCUMENTED IN MEDICAL RECORD: ICD-10-PCS | Mod: CPTII,S$GLB,, | Performed by: NURSE PRACTITIONER

## 2023-08-22 PROCEDURE — 1126F AMNT PAIN NOTED NONE PRSNT: CPT | Mod: CPTII,S$GLB,, | Performed by: NURSE PRACTITIONER

## 2023-08-22 PROCEDURE — G0439 PR MEDICARE ANNUAL WELLNESS SUBSEQUENT VISIT: ICD-10-PCS | Mod: S$GLB,,, | Performed by: NURSE PRACTITIONER

## 2023-08-22 PROCEDURE — 3074F PR MOST RECENT SYSTOLIC BLOOD PRESSURE < 130 MM HG: ICD-10-PCS | Mod: CPTII,S$GLB,, | Performed by: NURSE PRACTITIONER

## 2023-08-22 PROCEDURE — 3078F PR MOST RECENT DIASTOLIC BLOOD PRESSURE < 80 MM HG: ICD-10-PCS | Mod: CPTII,S$GLB,, | Performed by: NURSE PRACTITIONER

## 2023-08-22 RX ORDER — LISINOPRIL 5 MG/1
5 TABLET ORAL DAILY
Status: ON HOLD | COMMUNITY
End: 2024-03-01 | Stop reason: SDUPTHER

## 2023-08-22 NOTE — PROGRESS NOTES
[facility] - UROGYNECOLOGY  [address] [address]  2023     Myesha Quinones  [id number]  1940    UROGYN FOLLOW-UP  2023     83 y.o. female,   presents for urogyn follow up. She c/o   Chief Complaint   Patient presents with    Pessary Check     Pessary insertion    .     HPI from 2022:  Patient pelvic organ prolapse referred for conservative therapy with pessary patient with indwelling De La Cruz catheter.  Soft that the pessary will assist in patient in decompressing bladder.     2022  Here for pessary fitting to help improve incomplete bladder emptying due to prolapse     2022:  Patient doing well, using her vaginal estrogen cream about once weekly. Her pessary has been staying in place and comfortable, feels like she is emptying her bladder well and leaking is resolved. Denies UTI symptoms and constipation     2023:  Patient doing well, using her vaginal estrogen cream about once weekly. Her pessary has been staying in place and comfortable, feels like she is emptying her bladder well and leaking is resolved. Denies UTI symptoms and constipation. Has some urinary urgency, but never incontinence. Wears small pantyliners without leakage.      2023:  Patient has no complaints. Using vaginal estrogen cream 2 nights per week.  Denies urinary leakage and constipation and UTI symptoms. She is about to start dialysis.    Changes from last visit:  Here for insertion of #4 pessary instead of #5 that is causing some abrasions and very painful for patient during pessary check/removal and reinsertion. She has been using vaginal estrogen cream.     Past Medical History:   Diagnosis Date    Acute hypoxemic respiratory failure 2019    Acute right-sided thoracic back pain 2019    Allergy     Asthma     Basal cell carcinoma     left forehead    Basal cell carcinoma     left nose    Basal cell carcinoma 2015    right nose    Basal cell  carcinoma 12/22/2015    left lower post neck    Basal cell carcinoma 12/03/2019    left ant scalp     Basal cell carcinoma of right ala nasi 10/20/2022    Bilateral pleural effusion     Bilateral renal cysts     Breast cancer     CAD (coronary artery disease)     Cardiomyopathy     Cardiomyopathy, ischemic     Cataract     CHF (congestive heart failure)     CKD (chronic kidney disease) stage 4, GFR 15-29 ml/min     Colon polyp 2011    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, with long-term current use of insulin 02/22/2018    COPD (chronic obstructive pulmonary disease)     COPD exacerbation 04/08/2018    Current mild episode of major depressive disorder without prior episode 01/25/2022    Defibrillator discharge     Dependence on renal dialysis 8/22/2023    Diabetes mellitus     Diabetes mellitus type II     Diabetes with neurologic complications     Edema     ESRD needing dialysis     Goiter     MNG    Hematuria, unspecified     HX: breast cancer     Hyperlipidemia     Hypertension     Hypocalcemia     Hypokalemia     Hyponatremia     Hyponatremia 04/02/2022    Iron deficiency anemia 05/16/2017    Iron deficiency anemia     Iron deficiency anemia     Left kidney mass     Meningioma     Microalbuminuria due to type 2 diabetes mellitus 01/26/2022    Osteoporosis, postmenopausal     Pneumonia 12/08/2019    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    Proteinuria 01/21/2019    Pseudomonas pneumonia     Skin cancer     s/p excision    Sleep apnea     CPAP    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/2015    mid forehead    Unspecified vitamin D deficiency     UTI (urinary tract infection) 04/02/2022    Ventricular tachycardia     Vitamin B12 deficiency     Vitamin D deficiency disease        Past Surgical History:   Procedure Laterality Date    BASAL CELL CARCINOMA EXCISION      posterior neck and nose    BREAST BIOPSY      BREAST CYST EXCISION Left      BREAST SURGERY      CARDIAC DEFIBRILLATOR PLACEMENT      x 2    CATARACT EXTRACTION W/  INTRAOCULAR LENS IMPLANT Bilateral     CHOLECYSTECTOMY      COLONOSCOPY N/A 11/5/2019    Procedure: COLONOSCOPY;  Surgeon: Boaz Botello MD;  Location: [facility] ([address] [place] [address]);  Service: Endoscopy;  Laterality: N/A;  AICD - [employer]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    FRACTURE SURGERY      left elbow and wrist as a child    HYSTERECTOMY      INSERTION, CATHETER, DIALYSIS, PERITONEAL, LAPAROSCOPIC N/A 4/25/2023    Procedure: INSERTION, CATHETER, DIALYSIS, PERITONEAL, LAPAROSCOPIC;  Surgeon: Marcelo Isaac MD;  Location: [facility];  Service: General;  Laterality: N/A;    LAPAROSCOPIC LYSIS OF ADHESIONS N/A 4/25/2023    Procedure: LYSIS, ADHESIONS, LAPAROSCOPIC;  Surgeon: Marcelo Isaac MD;  Location: [facility];  Service: General;  Laterality: N/A;    MASTECTOMY Right     OMENTOPEXY, LAPAROSCOPIC N/A 4/25/2023    Procedure: OMENTOPEXY, LAPAROSCOPIC;  Surgeon: Marcelo Isaac MD;  Location: [facility];  Service: General;  Laterality: N/A;    REPLACEMENT OF IMPLANTABLE CARDIOVERTER-DEFIBRILLATOR (ICD) GENERATOR N/A 12/17/2018    Procedure: REPLACEMENT, ICD GENERATOR;  Surgeon: Jan Mckeon MD;  Location: [facility] EP LAB;  Service: Cardiology;  Laterality: N/A;  DONNA, CRT-D gen change, MDT, MAC, SK, 3 Prep    REVISION OF SKIN POCKET FOR CARDIOVERTER-DEFIBRILLATOR  12/17/2018    Procedure: Revision, Skin Pocket, For Cardioverter-Defibrillator;  Surgeon: Jan Mckeon MD;  Location: [facility] EP LAB;  Service: Cardiology;;    SQUAMOUS CELL CARCINOMA EXCISION      remved from forehead    TONSILLECTOMY         Family History   Problem Relation Age of Onset    Asthma Mother     Hypertension Mother     Stroke Mother     Diabetes Father     Cardiomyopathy Father     Diabetes Sister     Heart disease Sister     Heart attack Sister     Heart attack Brother     Diabetes Brother     Heart disease Brother     Hypertension  Brother     Diabetes Brother     Heart disease Brother     Hypertension Brother     Cerebral aneurysm Brother     Diabetes Brother     Heart disease Brother     Cancer Brother         colon    Diabetes Brother     Diabetes Daughter         prediabetes    Cancer Daughter         melanoma    Obesity Daughter     Melanoma Daughter     Cancer Son         skin    Diabetes Son         prediabetes    Diabetes Son     No Known Problems Maternal Grandmother     No Known Problems Maternal Grandfather     No Known Problems Paternal Grandmother     No Known Problems Paternal Grandfather     Amblyopia Neg Hx     Blindness Neg Hx     Cataracts Neg Hx     Glaucoma Neg Hx     Macular degeneration Neg Hx     Retinal detachment Neg Hx     Strabismus Neg Hx     Thyroid disease Neg Hx        Social History     Socioeconomic History    Marital status:    Occupational History    Occupation: Homemaker     Employer: OTHER   Tobacco Use    Smoking status: Never     Passive exposure: Never    Smokeless tobacco: Never   Substance and Sexual Activity    Alcohol use: No     Alcohol/week: 0.0 standard drinks of alcohol    Drug use: No    Sexual activity: Yes     Partners: Male   Other Topics Concern    Are you pregnant or think you may be? No    Breast-feeding No     Social Determinants of Health     Financial Resource Strain: Low Risk  (8/22/2023)    Overall Financial Resource Strain (CARDIA)     Difficulty of Paying Living Expenses: Not hard at all   Food Insecurity: No Food Insecurity (8/22/2023)    Hunger Vital Sign     Worried About Running Out of Food in the Last Year: Never true     Ran Out of Food in the Last Year: Never true   Transportation Needs: No Transportation Needs (8/22/2023)    PRAPARE - Transportation     Lack of Transportation (Medical): No     Lack of Transportation (Non-Medical): No   Physical Activity: Inactive (8/22/2023)    Exercise Vital Sign     Days of Exercise per Week: 0 days     Minutes of Exercise per  Session: 0 min   Stress: No Stress Concern Present (8/22/2023)    [employer] - Occupational Stress Questionnaire     Feeling of Stress : Only a little   Social Connections: Moderately Isolated (8/22/2023)    Social Connection and Isolation Panel [NHANES]     Frequency of Communication with Friends and Family: More than three times a week     Frequency of Social Gatherings with Friends and Family: More than three times a week     Attends Religion Services: More than 4 times per year     Active Member of Clubs or Organizations: No     Attends Club or Organization Meetings: Never     Marital Status:    Housing Stability: Low Risk  (8/22/2023)    Housing Stability Vital Sign     Unable to Pay for Housing in the Last Year: No     Number of Places Lived in the Last Year: 1     Unstable Housing in the Last Year: No       Current Outpatient Medications   Medication Sig Dispense Refill    aspirin 81 MG Chew Take 81 mg by mouth once daily.      blood sugar diagnostic (ACCU-CHEK HECTOR) Strp Uses Accu-Check Hector meter to test BG 4x/day 400 strip 6    carvediloL (COREG) 25 MG tablet TAKE 2 TABLETS (50 MG TOTAL) BY MOUTH 2 (TWO) TIMES DAILY. 360 tablet 3    cholecalciferol, vitamin D3, (VITAMIN D3) 50 mcg (2,000 unit) Tab Take 1 tablet by mouth once daily.       cyanocobalamin (VITAMIN B-12) 100 MCG tablet Take 100 mcg by mouth once daily.      dulaglutide (TRULICITY) 1.5 mg/0.5 mL pen injector Inject 1.5 mg into the skin every 7 days. 4 pen 11    epoetin [place]ica-epbx (RETACRIT) 10,000 unit/mL imjection Inject 1 mL (10,000 Units total) into the skin every 30 days. 1 mL 11    estradioL (ESTRACE) 0.01 % (0.1 mg/gram) vaginal cream Place 1 g vaginally every other day. 42.5 g 11    fluticasone propionate (FLONASE) 50 mcg/actuation nasal spray 1 spray (50 mcg total) by Each Nostril route 2 (two) times a day. 11.1 mL 3    furosemide (LASIX) 40 MG tablet Take 1 tablet (40 mg total) by mouth once  "daily. 90 tablet 3    hydrALAZINE (APRESOLINE) 100 MG tablet Take 1 tablet (100 mg total) by mouth 2 (two) times daily. 90 tablet 3    insulin (LANTUS SOLOSTAR U-100 INSULIN) glargine 100 units/mL SubQ pen Inject 10 Units into the skin every evening. 30 mL 3    lancets (ACCU-CHEK SOFTCLIX LANCETS) Misc Uses Accu-Chek Angelica meter to test BG 2x/day 400 each 6    lisinopriL (PRINIVIL,ZESTRIL) 5 MG tablet Take 5 mg by mouth once daily.      montelukast (SINGULAIR) 10 mg tablet Take 1 tablet (10 mg total) by mouth once daily. 90 tablet 3    omega-3 fatty acids 500 mg Cap Take 1 capsule by mouth Daily.      pen needle, diabetic (BD ULTRA-FINE MINI PEN NEEDLE) 31 gauge x 3/16" Ndle Use with insulin twice a day. 400 each 3    sodium bicarbonate 650 MG tablet Take 2 tablets (1,300 mg total) by mouth 2 (two) times daily. 360 tablet 3    valsartan (DIOVAN) 80 MG tablet TAKE 1 TABLET BY MOUTH 2 TIMES DAILY. 180 tablet 3    acetaminophen (TYLENOL) 500 MG tablet Take 500 mg by mouth as needed.      albuterol (PROVENTIL/VENTOLIN HFA) 90 mcg/actuation inhaler Inhale 2 puffs into the lungs every 6 (six) hours as needed for Wheezing. Rescue (Patient not taking: Reported on 8/23/2023) 18 g 12    albuterol-ipratropium (DUO-NEB) 2.5 mg-0.5 mg/3 mL nebulizer solution Take 3 mLs by nebulization every 4 (four) hours as needed for Wheezing. 270 mL 12    nitroGLYCERIN (NITROSTAT) 0.4 MG SL tablet Place 0.4 mg under the tongue every 5 (five) minutes as needed for Chest pain.       pravastatin (PRAVACHOL) 40 MG tablet Take 1 tablet (40 mg total) by mouth once daily. 90 tablet 3     Current Facility-Administered Medications   Medication Dose Route Frequency Provider Last Rate Last Admin    fluorescein 500 mg/5 mL (10 %) injection 500 mg  5 mL Intravenous Once CARRIE Arechiga MD           Review of patient's allergies indicates:   Allergen Reactions    Iodine and iodide containing products Hives and Other (See Comments)     Not specified  " "   Nifedipine      weakness       OB History          3    Para   3    Term   3            AB        Living             SAB        IAB        Ectopic        Multiple        Live Births                     ROS  As per HPI.      Physical Exam  /63 (BP Location: Right arm, Patient Position: Sitting, BP Method: Medium (Automatic))   Pulse 87   Ht 5' 3" (1.6 m)   Wt 59.2 kg (130 lb 8.2 oz)   LMP  (LMP Unknown)   BMI 23.12 kg/m²   General: alert and oriented, no acute distress  Respiratory: normal respiratory effort  Abd: soft, non-tender, non-distended  Pelvic:  Ext. Genitalia: normal external genitalia. Normal bartholin's and skeens glands  Vagina: + atrophy. Atrophic lesions from pessary at apex of vagina. No discharge noted.   Non-tender bladder base without palpable mass.  Cervix: absent  Uterus:  surgically absent, vaginal cuff well healed   Urethra: no masses or tenderness  Urethral meatus: no lesions, caruncle or prolapse.    #4 ring pessary with knob and support inserted without difficulty.     Impression  1. Encounter for fitting and adjustment of pessary          We reviewed the above issues and discussed options for short-term versus long-term management of her problems.     Plan:   Incomplete bladder emptying due to prolapse  -- pessary #5 ring with support and incontinence knob was too big, very painful to remove and re-insert  -- re-fit with #4 ring with support and incontinence knob, inserted today     Vaginal atrophy (dryness):  --  Use 1 gram of estrogen cream INSIDE vagina and at opening of vagina two nights a week. Apply to opening of vagina, as well, as inside vagina and at inner lips outside vagina.      RTC in 3 months for pessary check    10 minutes were spent in face to face time with this patient  50 % of this time was spent in counseling and/or coordination of care    Eric Rivera PA-C  Division of Female Pelvic Medicine and Reconstructive Surgery  Department of " Obstetrics & Gynecology  [facility] [place], [place]

## 2023-08-22 NOTE — PATIENT INSTRUCTIONS
Counseling and Referral of Other Preventative  (Italic type indicates deductible and co-insurance are waived)    Patient Name: Myesha Quinones  Today's Date: 8/22/2023    Health Maintenance       Date Due Completion Date    DEXA Scan 08/22/2023 (Originally 4/30/2020) 4/30/2018    Shingles Vaccine (1 of 2) 08/21/2024 (Originally 5/28/2009) 4/2/2009    COVID-19 Vaccine (4 - Pfizer series) 08/21/2024 (Originally 1/7/2022) 11/12/2021    Influenza Vaccine (1) 09/01/2023 10/7/2022    Hemoglobin A1c 01/06/2024 7/6/2023    Lipid Panel 05/19/2024 5/19/2023    Eye Exam 05/25/2024 5/25/2023    TETANUS VACCINE 07/22/2024 7/22/2014        No orders of the defined types were placed in this encounter.    The following information is provided to all patients.  This information is to help you find resources for any of the problems found today that may be affecting your health:                Living healthy guide: www.[facility].[place].gov      Understanding Diabetes: www.diabetes.org      Eating healthy: www.cdc.gov/healthyweight      CDC home safety checklist: www.cdc.gov/steadi/patient.html      Agency on Aging: www.goea.[place].gov      Alcoholics anonymous (AA): www.aa.org      Physical Activity: www.oanh.nih.gov/lw1vjwy      Tobacco use: www.quitwithusla.org

## 2023-08-23 ENCOUNTER — OFFICE VISIT (OUTPATIENT)
Dept: UROGYNECOLOGY | Facility: CLINIC | Age: 83
End: 2023-08-23
Payer: MEDICARE

## 2023-08-23 VITALS
WEIGHT: 130.5 LBS | HEIGHT: 63 IN | DIASTOLIC BLOOD PRESSURE: 63 MMHG | BODY MASS INDEX: 23.12 KG/M2 | HEART RATE: 87 BPM | SYSTOLIC BLOOD PRESSURE: 127 MMHG

## 2023-08-23 DIAGNOSIS — Z46.89 ENCOUNTER FOR FITTING AND ADJUSTMENT OF PESSARY: Primary | ICD-10-CM

## 2023-08-23 PROCEDURE — 1126F AMNT PAIN NOTED NONE PRSNT: CPT | Mod: CPTII,S$GLB,, | Performed by: PHYSICIAN ASSISTANT

## 2023-08-23 PROCEDURE — 99999 PR PBB SHADOW E&M-EST. PATIENT-LVL IV: ICD-10-PCS | Mod: PBBFAC,,, | Performed by: PHYSICIAN ASSISTANT

## 2023-08-23 PROCEDURE — 3078F DIAST BP <80 MM HG: CPT | Mod: CPTII,S$GLB,, | Performed by: PHYSICIAN ASSISTANT

## 2023-08-23 PROCEDURE — 1159F MED LIST DOCD IN RCRD: CPT | Mod: CPTII,S$GLB,, | Performed by: PHYSICIAN ASSISTANT

## 2023-08-23 PROCEDURE — 99999 PR PBB SHADOW E&M-EST. PATIENT-LVL IV: CPT | Mod: PBBFAC,,, | Performed by: PHYSICIAN ASSISTANT

## 2023-08-23 PROCEDURE — 3074F PR MOST RECENT SYSTOLIC BLOOD PRESSURE < 130 MM HG: ICD-10-PCS | Mod: CPTII,S$GLB,, | Performed by: PHYSICIAN ASSISTANT

## 2023-08-23 PROCEDURE — 1126F PR PAIN SEVERITY QUANTIFIED, NO PAIN PRESENT: ICD-10-PCS | Mod: CPTII,S$GLB,, | Performed by: PHYSICIAN ASSISTANT

## 2023-08-23 PROCEDURE — 99212 OFFICE O/P EST SF 10 MIN: CPT | Mod: S$GLB,,, | Performed by: PHYSICIAN ASSISTANT

## 2023-08-23 PROCEDURE — 99212 PR OFFICE/OUTPT VISIT, EST, LEVL II, 10-19 MIN: ICD-10-PCS | Mod: S$GLB,,, | Performed by: PHYSICIAN ASSISTANT

## 2023-08-23 PROCEDURE — 3078F PR MOST RECENT DIASTOLIC BLOOD PRESSURE < 80 MM HG: ICD-10-PCS | Mod: CPTII,S$GLB,, | Performed by: PHYSICIAN ASSISTANT

## 2023-08-23 PROCEDURE — 3074F SYST BP LT 130 MM HG: CPT | Mod: CPTII,S$GLB,, | Performed by: PHYSICIAN ASSISTANT

## 2023-08-23 PROCEDURE — 1159F PR MEDICATION LIST DOCUMENTED IN MEDICAL RECORD: ICD-10-PCS | Mod: CPTII,S$GLB,, | Performed by: PHYSICIAN ASSISTANT

## 2023-08-31 ENCOUNTER — PATIENT MESSAGE (OUTPATIENT)
Dept: FAMILY MEDICINE | Facility: CLINIC | Age: 83
End: 2023-08-31
Payer: MEDICARE

## 2023-09-07 ENCOUNTER — OFFICE VISIT (OUTPATIENT)
Dept: OPHTHALMOLOGY | Facility: CLINIC | Age: 83
End: 2023-09-07
Payer: MEDICARE

## 2023-09-07 DIAGNOSIS — H35.033 HYPERTENSIVE RETINOPATHY, BILATERAL: ICD-10-CM

## 2023-09-07 DIAGNOSIS — E11.3213 CONTROLLED TYPE 2 DIABETES MELLITUS WITH BOTH EYES AFFECTED BY MILD NONPROLIFERATIVE RETINOPATHY AND MACULAR EDEMA, WITH LONG-TERM CURRENT USE OF INSULIN: Primary | ICD-10-CM

## 2023-09-07 DIAGNOSIS — Z79.4 CONTROLLED TYPE 2 DIABETES MELLITUS WITH BOTH EYES AFFECTED BY MILD NONPROLIFERATIVE RETINOPATHY AND MACULAR EDEMA, WITH LONG-TERM CURRENT USE OF INSULIN: Primary | ICD-10-CM

## 2023-09-07 PROCEDURE — 1160F PR REVIEW ALL MEDS BY PRESCRIBER/CLIN PHARMACIST DOCUMENTED: ICD-10-PCS | Mod: CPTII,S$GLB,, | Performed by: OPHTHALMOLOGY

## 2023-09-07 PROCEDURE — 3288F FALL RISK ASSESSMENT DOCD: CPT | Mod: CPTII,S$GLB,, | Performed by: OPHTHALMOLOGY

## 2023-09-07 PROCEDURE — 99999 PR PBB SHADOW E&M-EST. PATIENT-LVL IV: ICD-10-PCS | Mod: PBBFAC,,, | Performed by: OPHTHALMOLOGY

## 2023-09-07 PROCEDURE — 1159F MED LIST DOCD IN RCRD: CPT | Mod: CPTII,S$GLB,, | Performed by: OPHTHALMOLOGY

## 2023-09-07 PROCEDURE — 92201 OPSCPY EXTND RTA DRAW UNI/BI: CPT | Mod: S$GLB,,, | Performed by: OPHTHALMOLOGY

## 2023-09-07 PROCEDURE — 92201 PR OPHTHALMOSCOPY, EXT, W/RET DRAW/SCLERAL DEPR, I&R, UNI/BI: ICD-10-PCS | Mod: S$GLB,,, | Performed by: OPHTHALMOLOGY

## 2023-09-07 PROCEDURE — 92235 FLUORESCEIN ANGRPH MLTIFRAME: CPT | Mod: S$GLB,,, | Performed by: OPHTHALMOLOGY

## 2023-09-07 PROCEDURE — 92134 POSTERIOR SEGMENT OCT RETINA (OCULAR COHERENCE TOMOGRAPHY)-BOTH EYES: ICD-10-PCS | Mod: S$GLB,,, | Performed by: OPHTHALMOLOGY

## 2023-09-07 PROCEDURE — 1101F PR PT FALLS ASSESS DOC 0-1 FALLS W/OUT INJ PAST YR: ICD-10-PCS | Mod: CPTII,S$GLB,, | Performed by: OPHTHALMOLOGY

## 2023-09-07 PROCEDURE — 1160F RVW MEDS BY RX/DR IN RCRD: CPT | Mod: CPTII,S$GLB,, | Performed by: OPHTHALMOLOGY

## 2023-09-07 PROCEDURE — 1159F PR MEDICATION LIST DOCUMENTED IN MEDICAL RECORD: ICD-10-PCS | Mod: CPTII,S$GLB,, | Performed by: OPHTHALMOLOGY

## 2023-09-07 PROCEDURE — 2022F DILAT RTA XM EVC RTNOPTHY: CPT | Mod: CPTII,S$GLB,, | Performed by: OPHTHALMOLOGY

## 2023-09-07 PROCEDURE — 92014 PR EYE EXAM, EST PATIENT,COMPREHESV: ICD-10-PCS | Mod: S$GLB,,, | Performed by: OPHTHALMOLOGY

## 2023-09-07 PROCEDURE — 92014 COMPRE OPH EXAM EST PT 1/>: CPT | Mod: S$GLB,,, | Performed by: OPHTHALMOLOGY

## 2023-09-07 PROCEDURE — 92235 FLUORESCEIN ANGIOGRAPHY - OU - BOTH EYES: ICD-10-PCS | Mod: S$GLB,,, | Performed by: OPHTHALMOLOGY

## 2023-09-07 PROCEDURE — 3288F PR FALLS RISK ASSESSMENT DOCUMENTED: ICD-10-PCS | Mod: CPTII,S$GLB,, | Performed by: OPHTHALMOLOGY

## 2023-09-07 PROCEDURE — 99999 PR PBB SHADOW E&M-EST. PATIENT-LVL IV: CPT | Mod: PBBFAC,,, | Performed by: OPHTHALMOLOGY

## 2023-09-07 PROCEDURE — 2022F PR DILATED RETINAL EYE EXAM WITH INTERP/REVIEW: ICD-10-PCS | Mod: CPTII,S$GLB,, | Performed by: OPHTHALMOLOGY

## 2023-09-07 PROCEDURE — 1101F PT FALLS ASSESS-DOCD LE1/YR: CPT | Mod: CPTII,S$GLB,, | Performed by: OPHTHALMOLOGY

## 2023-09-07 PROCEDURE — 92134 CPTRZ OPH DX IMG PST SGM RTA: CPT | Mod: S$GLB,,, | Performed by: OPHTHALMOLOGY

## 2023-09-07 RX ADMIN — FLUORESCEIN 500 MG: 500 INJECTION INTRAVENOUS at 09:09

## 2023-09-07 NOTE — PROGRESS NOTES
HPI     4 month follow up   DM and ME      Additional comments: DME   RETINAL MICRONANEURYSM OF  OS     LAST BS    97   LAST A1C   5.6    PT HERE FOR FA  OD TODAY   AND OCT  OU     PREVIOUS  INJECTIONS   DME  OS   PCIOL          OCT - OD trace parafoveal ME - improved  OS - inferior fibrosis at TORSTEN site - improved    WFFA - Foveal MA's OS with some macular ischemia - two large bore MA's with minimal late leakage  OD - few MA's, no sig leakage  No NV or sig peripheral NP OU      Lost  to COVID complications 12/20    A/P    1. 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 DME OS  S/p Avastin OS x 5  S/p Ozurdex OS x 5 9/19  S/p Iluvien 9/19    Doing well, will need chronic steroid  - may need additional Iluvien OS soon  Watch OD for now    Can consider light focal/micropulse to parafoveal MA's OS if worsens    5/23 - increase in parafoveal ME OD with good vision - monitor for now      3. HTN Ret OU  BS/BP/chol control  5/22 TORSTEN OS with SRF and preretinal heme  S/p Avastin OS x 2    TORSTEN ruptured and involuted - no tx today    4. PCIOL OU  With small PCO      6 months OCT and dilate

## 2023-09-12 ENCOUNTER — CLINICAL SUPPORT (OUTPATIENT)
Dept: OTOLARYNGOLOGY | Facility: CLINIC | Age: 83
End: 2023-09-12
Payer: MEDICARE

## 2023-09-12 DIAGNOSIS — Z46.1 ENCOUNTER FOR FITTING AND ADJUSTMENT OF HEARING AID OF BOTH EARS: Primary | ICD-10-CM

## 2023-09-12 PROCEDURE — 99499 NO LOS: ICD-10-PCS | Mod: ,,, | Performed by: AUDIOLOGIST-HEARING AID FITTER

## 2023-09-12 PROCEDURE — 99499 UNLISTED E&M SERVICE: CPT | Mod: ,,, | Performed by: AUDIOLOGIST-HEARING AID FITTER

## 2023-09-12 NOTE — PROGRESS NOTES
Susannah Carter, CCC-A  Audiologist - [facility] [address] [place] [address]  [email or web address]  [phone]    Patient: Myesha Quinones   MRN: [id number]  [address]  Home Phone [phone]   Work Phone [phone]   Mobile [phone]   : 1940  PELAYO: 2023      HEARING AID FOLLOW-UP      Myesha Quinones is here today with her son, Macho, to  her Right hearing aid from repair (serial #[id number]) with Right mold #[id number].  Patient reports that the loaner hearing aid worked fine for her, but the wire is coming out of her Left encased mold again.  See [facility] and request longer removal string for patient dexterity and less pressure on  wire during removal.  Advised Macho, will call upon receiving replacement mold in clinic.  Opened 23 session and verified proper serial number and acoustics.  Calibrated AU and then saved without any changes to settings.  Forgot previous devices from iPhone and then re-paired current devices to iPhone.  Verified worked in office for incoming/outgoing phone calls only, per patient request.  Myesha Quinones verbalized understanding and satisfaction with today's visit, stating that the hearing aids feel and sound good in her ears.  She will call if service is needed before her next appointment to  her remade Left earmold and discuss her warranty coverage.      [facility]  #[id number] (R)  #[phone] (L)  Warranty expires: 10/24/2024    _______________________________  Susannah Carter, WINDY-A  Audiologist

## 2023-10-11 NOTE — PHARMACY MED REC
"  Admission Medication History     The home medication history was taken by Fiona Colorado CPhT.    Medication history obtained from, Patient Verified    You may go to "Admission" then "Reconcile Home Medications" tabs to review and/or act upon these items.      The home medication list has been updated by the Pharmacy department.    Please read ALL comments highlighted in yellow.    Please address this information as you see fit.     Feel free to contact us if you have any questions or require assistance.      The medications listed below were removed from the home medication list.  Please reorder if appropriate:  Patient reports no longer taking the following medication(s):   XYZAL 5 mg   Sodium Chloride 3% Neb   Spiriva 18 mcg        Fiona Colorado CPhT.  Ext [phone]              .          " Patient presents for Possible Aranesp injection today.     Component      Latest Ref Rng 10/11/2023  9:20 AM   HGB      13.0 - 17.0 g/dL 11.2 (L)      Per parameters in treatment plan:   \"Hold treatment and notify provider for: Hemaglobin > 11 g/dL for CDK/ESRD\"    Aranesp was held today per parameters.     RN reviewed labs, vital signs and assessment with Yuridia Mercer PA-C.    I am an RN. Does the patient have an active anti-cancer treatment plan? (oral, injection, or IV) No.    Reviewed and verified Advanced Directives: No: Patient would like assistance with completing or updating document.  Patient referred to the  to complete     Pre-Injection Information: Allergies reviewed as required for injection type., Pt states feeling well, no complaints. and Pt denies signs and symptoms of infection.    Dr. Astrid Dixon MD is supervising clinician today.

## 2023-10-12 NOTE — PLAN OF CARE
04/09/18 1438   Discharge Assessment   Assessment Type Discharge Planning Assessment   Confirmed/corrected address and phone number on facesheet? Yes   Assessment information obtained from? Patient   Expected Length of Stay (days) 1   Communicated expected length of stay with patient/caregiver yes   Prior to hospitilization cognitive status: Alert/Oriented   Prior to hospitalization functional status: Independent   Current cognitive status: Alert/Oriented   Current Functional Status: Independent   Lives With (spouse, Migue Quinones ([phone]))   Able to Return to Prior Arrangements yes   Is patient able to care for self after discharge? Yes   Patient's perception of discharge disposition home or selfcare   Readmission Within The Last 30 Days no previous admission in last 30 days   Patient currently being followed by outpatient case management? No   Patient currently receives any other outside agency services? No   Equipment Currently Used at Home glucometer;CPAP;other (see comments)  (BP machine)   Do you have any problems affording any of your prescribed medications? No   Is the patient taking medications as prescribed? yes   Does the patient have transportation home? Yes   Transportation Available family or friend will provide  (spouse)   Does the patient receive services at the Coumadin Clinic? No   Discharge Plan A Home with family   Discharge Plan B Home Health   Patient/Family In Agreement With Plan yes     Patient awake & alert in bed with spouse, Migue Quinones, at the bedside when CM rounded. Patient was admitted with COPD exacerbation. Patient lives with Migue & is independent of all ADLs. Plan to discharge patient home with support or home with home health when medically stable. Migue stated that he will provide transportation at time of discharge. Hospital follow up appointment scheduled for the patient with Dr. Gaston (PCP) on 4/30/18 at 1000. Will continue to follow.    Patent

## 2023-10-18 ENCOUNTER — OFFICE VISIT (OUTPATIENT)
Dept: DERMATOLOGY | Facility: CLINIC | Age: 83
End: 2023-10-18
Payer: MEDICARE

## 2023-10-18 DIAGNOSIS — D48.5 NEOPLASM OF UNCERTAIN BEHAVIOR OF SKIN: Primary | ICD-10-CM

## 2023-10-18 DIAGNOSIS — L82.1 SEBORRHEIC KERATOSES: ICD-10-CM

## 2023-10-18 DIAGNOSIS — L81.4 LENTIGINES: ICD-10-CM

## 2023-10-18 DIAGNOSIS — Z85.828 HISTORY OF NONMELANOMA SKIN CANCER: ICD-10-CM

## 2023-10-18 DIAGNOSIS — L57.0 AK (ACTINIC KERATOSIS): ICD-10-CM

## 2023-10-18 PROCEDURE — 88305 TISSUE EXAM BY PATHOLOGIST: ICD-10-PCS | Mod: 26,,, | Performed by: PATHOLOGY

## 2023-10-18 PROCEDURE — 99213 OFFICE O/P EST LOW 20 MIN: CPT | Mod: 25,S$GLB,, | Performed by: STUDENT IN AN ORGANIZED HEALTH CARE EDUCATION/TRAINING PROGRAM

## 2023-10-18 PROCEDURE — 1126F PR PAIN SEVERITY QUANTIFIED, NO PAIN PRESENT: ICD-10-PCS | Mod: CPTII,S$GLB,, | Performed by: STUDENT IN AN ORGANIZED HEALTH CARE EDUCATION/TRAINING PROGRAM

## 2023-10-18 PROCEDURE — 1101F PR PT FALLS ASSESS DOC 0-1 FALLS W/OUT INJ PAST YR: ICD-10-PCS | Mod: CPTII,S$GLB,, | Performed by: STUDENT IN AN ORGANIZED HEALTH CARE EDUCATION/TRAINING PROGRAM

## 2023-10-18 PROCEDURE — 1101F PT FALLS ASSESS-DOCD LE1/YR: CPT | Mod: CPTII,S$GLB,, | Performed by: STUDENT IN AN ORGANIZED HEALTH CARE EDUCATION/TRAINING PROGRAM

## 2023-10-18 PROCEDURE — 1160F RVW MEDS BY RX/DR IN RCRD: CPT | Mod: CPTII,S$GLB,, | Performed by: STUDENT IN AN ORGANIZED HEALTH CARE EDUCATION/TRAINING PROGRAM

## 2023-10-18 PROCEDURE — 1159F PR MEDICATION LIST DOCUMENTED IN MEDICAL RECORD: ICD-10-PCS | Mod: CPTII,S$GLB,, | Performed by: STUDENT IN AN ORGANIZED HEALTH CARE EDUCATION/TRAINING PROGRAM

## 2023-10-18 PROCEDURE — 17000 DESTRUCT PREMALG LESION: CPT | Mod: XS,S$GLB,, | Performed by: STUDENT IN AN ORGANIZED HEALTH CARE EDUCATION/TRAINING PROGRAM

## 2023-10-18 PROCEDURE — 99213 PR OFFICE/OUTPT VISIT, EST, LEVL III, 20-29 MIN: ICD-10-PCS | Mod: 25,S$GLB,, | Performed by: STUDENT IN AN ORGANIZED HEALTH CARE EDUCATION/TRAINING PROGRAM

## 2023-10-18 PROCEDURE — 17000 PR DESTRUCTION(LASER SURGERY,CRYOSURGERY,CHEMOSURGERY),PREMALIGNANT LESIONS,FIRST LESION: ICD-10-PCS | Mod: XS,S$GLB,, | Performed by: STUDENT IN AN ORGANIZED HEALTH CARE EDUCATION/TRAINING PROGRAM

## 2023-10-18 PROCEDURE — 1159F MED LIST DOCD IN RCRD: CPT | Mod: CPTII,S$GLB,, | Performed by: STUDENT IN AN ORGANIZED HEALTH CARE EDUCATION/TRAINING PROGRAM

## 2023-10-18 PROCEDURE — 3288F PR FALLS RISK ASSESSMENT DOCUMENTED: ICD-10-PCS | Mod: CPTII,S$GLB,, | Performed by: STUDENT IN AN ORGANIZED HEALTH CARE EDUCATION/TRAINING PROGRAM

## 2023-10-18 PROCEDURE — 1126F AMNT PAIN NOTED NONE PRSNT: CPT | Mod: CPTII,S$GLB,, | Performed by: STUDENT IN AN ORGANIZED HEALTH CARE EDUCATION/TRAINING PROGRAM

## 2023-10-18 PROCEDURE — 88305 TISSUE EXAM BY PATHOLOGIST: CPT | Mod: 26,,, | Performed by: PATHOLOGY

## 2023-10-18 PROCEDURE — 99999 PR PBB SHADOW E&M-EST. PATIENT-LVL IV: CPT | Mod: PBBFAC,,, | Performed by: STUDENT IN AN ORGANIZED HEALTH CARE EDUCATION/TRAINING PROGRAM

## 2023-10-18 PROCEDURE — 11102 PR TANGENTIAL BIOPSY, SKIN, SINGLE LESION: ICD-10-PCS | Mod: S$GLB,,, | Performed by: STUDENT IN AN ORGANIZED HEALTH CARE EDUCATION/TRAINING PROGRAM

## 2023-10-18 PROCEDURE — 17003 DESTRUCTION, PREMALIGNANT LESIONS; SECOND THROUGH 14 LESIONS: ICD-10-PCS | Mod: S$GLB,,, | Performed by: STUDENT IN AN ORGANIZED HEALTH CARE EDUCATION/TRAINING PROGRAM

## 2023-10-18 PROCEDURE — 88305 TISSUE EXAM BY PATHOLOGIST: CPT | Performed by: PATHOLOGY

## 2023-10-18 PROCEDURE — 17003 DESTRUCT PREMALG LES 2-14: CPT | Mod: S$GLB,,, | Performed by: STUDENT IN AN ORGANIZED HEALTH CARE EDUCATION/TRAINING PROGRAM

## 2023-10-18 PROCEDURE — 99999 PR PBB SHADOW E&M-EST. PATIENT-LVL IV: ICD-10-PCS | Mod: PBBFAC,,, | Performed by: STUDENT IN AN ORGANIZED HEALTH CARE EDUCATION/TRAINING PROGRAM

## 2023-10-18 PROCEDURE — 3288F FALL RISK ASSESSMENT DOCD: CPT | Mod: CPTII,S$GLB,, | Performed by: STUDENT IN AN ORGANIZED HEALTH CARE EDUCATION/TRAINING PROGRAM

## 2023-10-18 PROCEDURE — 11102 TANGNTL BX SKIN SINGLE LES: CPT | Mod: S$GLB,,, | Performed by: STUDENT IN AN ORGANIZED HEALTH CARE EDUCATION/TRAINING PROGRAM

## 2023-10-18 PROCEDURE — 1160F PR REVIEW ALL MEDS BY PRESCRIBER/CLIN PHARMACIST DOCUMENTED: ICD-10-PCS | Mod: CPTII,S$GLB,, | Performed by: STUDENT IN AN ORGANIZED HEALTH CARE EDUCATION/TRAINING PROGRAM

## 2023-10-18 RX ORDER — ERGOCALCIFEROL 1.25 MG/1
50000 CAPSULE ORAL
COMMUNITY
Start: 2023-10-17 | End: 2024-03-21

## 2023-10-18 RX ORDER — SEVELAMER CARBONATE 800 MG/1
800 TABLET, FILM COATED ORAL 3 TIMES DAILY
Status: ON HOLD | COMMUNITY
Start: 2023-07-27 | End: 2024-03-01 | Stop reason: SDUPTHER

## 2023-10-18 NOTE — PROGRESS NOTES
Subjective:      Patient ID:  Myesha Quinones is a 83 y.o. female who presents for   Chief Complaint   Patient presents with    Skin Check     Ubse     Myesha Quinones is a 83 y.o. female who presents for: UBSE screening exam for skin cancer.    Last office visit 6/9/2022 Dr. Fuentes    The patient has the following lesions of concern:  Location: face and chest  Duration: couple months  Symptoms: itching on mid chest  Relieving factors/Previous treatments: none    Pertinent history:  History of blistering sunburns:Yes  History of tanning bed use: No  Family history of melanoma: No  Personal history of mole removal: No  Personal history of skin cancer: Yes  SCC right posterior neck Mohs 8/2022 by SSW  BCC right alar groove Mohs SSW 8/2022  And many    Has ICD, on dialysis      Review of Systems   Skin:  Positive for daily sunscreen use and activity-related sunscreen use. Negative for recent sunburn and wears hat.   Hematologic/Lymphatic: Bruises/bleeds easily.       Objective:   Physical Exam   Constitutional: She appears well-developed and well-nourished. No distress.   Neurological: She is alert and oriented to person, place, and time. She is not disoriented.   Psychiatric: She has a normal mood and affect.   Skin:   Areas Examined (abnormalities noted in diagram):   Scalp / Hair Palpated and Inspected  Head / Face Inspection Performed  Neck Inspection Performed  Chest / Axilla Inspection Performed  Back Inspection Performed  RUE Inspected  LUE Inspection Performed  Nails and Digits Inspection Performed                 Diagram Legend     Erythematous scaling macule/papule c/w actinic keratosis       Vascular papule c/w angioma      Pigmented verrucoid papule/plaque c/w seborrheic keratosis      Yellow umbilicated papule c/w sebaceous hyperplasia      Irregularly shaped tan macule c/w lentigo     1-2 mm smooth white papules consistent with Milia      Movable subcutaneous cyst with punctum c/w epidermal  inclusion cyst      Subcutaneous movable cyst c/w pilar cyst      Firm pink to brown papule c/w dermatofibroma      Pedunculated fleshy papule(s) c/w skin tag(s)      Evenly pigmented macule c/w junctional nevus     Mildly variegated pigmented, slightly irregular-bordered macule c/w mildly atypical nevus      Flesh colored to evenly pigmented papule c/w intradermal nevus       Pink pearly papule/plaque c/w basal cell carcinoma      Erythematous hyperkeratotic cursted plaque c/w SCC      Surgical scar with no sign of skin cancer recurrence      Open and closed comedones      Inflammatory papules and pustules      Verrucoid papule consistent consistent with wart     Erythematous eczematous patches and plaques     Dystrophic onycholytic nail with subungual debris c/w onychomycosis     Umbilicated papule    Erythematous-base heme-crusted tan verrucoid plaque consistent with inflamed seborrheic keratosis     Erythematous Silvery Scaling Plaque c/w Psoriasis     See annotation          Assessment / Plan:      Pathology Orders:       Normal Orders This Visit    Specimen to Pathology, Dermatology     Questions:    Procedure Type: Dermatology and skin neoplasms    Number of Specimens: 1    ------------------------: -------------------------    Spec 1 Procedure: Biopsy    Spec 1 Clinical Impression: pink crusted papule 4 mm- bcc v seb hyperplasia sk v other    Spec 1 Source: right nasal bridge    Release to patient: Immediate          Neoplasm of uncertain behavior of skin  -     Specimen to Pathology, Dermatology  Shave biopsy procedure note:    Shave biopsy performed after verbal consent including risk of infection, scar, recurrence, need for additional treatment of site. Area prepped with alcohol, anesthetized with approximately 1.0cc of 1% lidocaine with epinephrine. Lesional tissue shaved with razor blade. Hemostasis achieved with application of aluminum chloride followed by hyfrecation. No complications. Dressing  applied. Wound care explained.    If positive for NMSC will send to Dr. Roche for Mohs    AK (actinic keratosis)  Cryosurgery Procedure Note    Verbal consent from the patient is obtained including, but not limited to, risk of hypopigmentation/hyperpigmentation, scar, recurrence of lesion. The patient is aware of the precancerous quality and need for treatment of these lesions. Liquid nitrogen cryosurgery is applied to the 3 actinic keratoses, as detailed in the physical exam, to produce a freeze injury. The patient is aware that blisters may form and is instructed on wound care with gentle cleansing and use of vaseline ointment to keep moist until healed. The patient is supplied a handout on cryosurgery and is instructed to call if lesions do not completely resolve.    Seborrheic keratoses  These are benign inherited growths without a malignant potential. Reassurance given to patient. No treatment is necessary.     Lentigines  This is a benign hyperpigmented sun induced lesion. Recommend daily sun protection/avoidance and use of at least SPF 30, broad spectrum sunscreen (OTC drug) will reduce the number of new lesions. Treatment of these benign lesions are considered cosmetic.    History of nonmelanoma skin cancer  Examined areas of prior scars, no evidence of recurrence  - Continue skin exams at least annually, increased risk of additional skin cancers developing in the future  - I discussed the importance of sun protection with the patient. Recommend daily use of SPF 30+ physical or mineral sunscreen, apply 15 minutes before going outdoors and reapply every 2 hours. Discussed wide-brimmed hats and UPF 50+ clothing.    RTC 6 months

## 2023-10-24 ENCOUNTER — LAB VISIT (OUTPATIENT)
Dept: LAB | Facility: HOSPITAL | Age: 83
End: 2023-10-24
Attending: FAMILY MEDICINE
Payer: MEDICARE

## 2023-10-24 ENCOUNTER — OFFICE VISIT (OUTPATIENT)
Dept: FAMILY MEDICINE | Facility: CLINIC | Age: 83
End: 2023-10-24
Attending: FAMILY MEDICINE
Payer: MEDICARE

## 2023-10-24 VITALS
SYSTOLIC BLOOD PRESSURE: 120 MMHG | TEMPERATURE: 98 F | WEIGHT: 129.44 LBS | DIASTOLIC BLOOD PRESSURE: 70 MMHG | BODY MASS INDEX: 22.93 KG/M2 | HEART RATE: 79 BPM | OXYGEN SATURATION: 97 % | HEIGHT: 63 IN

## 2023-10-24 DIAGNOSIS — N18.6 CONTROLLED TYPE 2 DIABETES MELLITUS WITH CHRONIC KIDNEY DISEASE ON CHRONIC DIALYSIS, WITH LONG-TERM CURRENT USE OF INSULIN: Primary | ICD-10-CM

## 2023-10-24 DIAGNOSIS — E11.22 TYPE 2 DIABETES MELLITUS WITH STAGE 4 CHRONIC KIDNEY DISEASE AND HYPERTENSION: ICD-10-CM

## 2023-10-24 DIAGNOSIS — Z99.2 PERITONEAL DIALYSIS STATUS: ICD-10-CM

## 2023-10-24 DIAGNOSIS — E78.5 HYPERLIPIDEMIA ASSOCIATED WITH TYPE 2 DIABETES MELLITUS: ICD-10-CM

## 2023-10-24 DIAGNOSIS — E11.69 HYPERLIPIDEMIA ASSOCIATED WITH TYPE 2 DIABETES MELLITUS: ICD-10-CM

## 2023-10-24 DIAGNOSIS — E11.29 MICROALBUMINURIA DUE TO TYPE 2 DIABETES MELLITUS: ICD-10-CM

## 2023-10-24 DIAGNOSIS — F33.9 MAJOR DEPRESSION, RECURRENT, CHRONIC: ICD-10-CM

## 2023-10-24 DIAGNOSIS — R80.9 MICROALBUMINURIA DUE TO TYPE 2 DIABETES MELLITUS: ICD-10-CM

## 2023-10-24 DIAGNOSIS — N18.4 TYPE 2 DIABETES MELLITUS WITH STAGE 4 CHRONIC KIDNEY DISEASE AND HYPERTENSION: ICD-10-CM

## 2023-10-24 DIAGNOSIS — B35.3 TINEA PEDIS OF BOTH FEET: ICD-10-CM

## 2023-10-24 DIAGNOSIS — Z79.4 CONTROLLED TYPE 2 DIABETES MELLITUS WITH CHRONIC KIDNEY DISEASE ON CHRONIC DIALYSIS, WITH LONG-TERM CURRENT USE OF INSULIN: Primary | ICD-10-CM

## 2023-10-24 DIAGNOSIS — I12.9 TYPE 2 DIABETES MELLITUS WITH STAGE 4 CHRONIC KIDNEY DISEASE AND HYPERTENSION: ICD-10-CM

## 2023-10-24 DIAGNOSIS — N18.5 CHRONIC KIDNEY DISEASE (CKD), STAGE V: ICD-10-CM

## 2023-10-24 DIAGNOSIS — E11.42 DIABETIC POLYNEUROPATHY ASSOCIATED WITH TYPE 2 DIABETES MELLITUS: ICD-10-CM

## 2023-10-24 DIAGNOSIS — Z99.2 CONTROLLED TYPE 2 DIABETES MELLITUS WITH CHRONIC KIDNEY DISEASE ON CHRONIC DIALYSIS, WITH LONG-TERM CURRENT USE OF INSULIN: Primary | ICD-10-CM

## 2023-10-24 DIAGNOSIS — E11.22 CONTROLLED TYPE 2 DIABETES MELLITUS WITH CHRONIC KIDNEY DISEASE ON CHRONIC DIALYSIS, WITH LONG-TERM CURRENT USE OF INSULIN: Primary | ICD-10-CM

## 2023-10-24 LAB
ALBUMIN SERPL BCP-MCNC: 3.2 G/DL (ref 3.5–5.2)
ALP SERPL-CCNC: 169 U/L (ref 55–135)
ALT SERPL W/O P-5'-P-CCNC: 14 U/L (ref 10–44)
ANION GAP SERPL CALC-SCNC: 17 MMOL/L (ref 8–16)
AST SERPL-CCNC: 18 U/L (ref 10–40)
BASOPHILS # BLD AUTO: 0.04 K/UL (ref 0–0.2)
BASOPHILS NFR BLD: 0.5 % (ref 0–1.9)
BILIRUB SERPL-MCNC: 0.4 MG/DL (ref 0.1–1)
BUN SERPL-MCNC: 33 MG/DL (ref 8–23)
CALCIUM SERPL-MCNC: 8.7 MG/DL (ref 8.7–10.5)
CHLORIDE SERPL-SCNC: 105 MMOL/L (ref 95–110)
CO2 SERPL-SCNC: 14 MMOL/L (ref 23–29)
CREAT SERPL-MCNC: 2.7 MG/DL (ref 0.5–1.4)
DIFFERENTIAL METHOD: ABNORMAL
EOSINOPHIL # BLD AUTO: 0.7 K/UL (ref 0–0.5)
EOSINOPHIL NFR BLD: 8.1 % (ref 0–8)
ERYTHROCYTE [DISTWIDTH] IN BLOOD BY AUTOMATED COUNT: 13.1 % (ref 11.5–14.5)
EST. GFR  (NO RACE VARIABLE): 17 ML/MIN/1.73 M^2
GLUCOSE SERPL-MCNC: 173 MG/DL (ref 70–110)
HCT VFR BLD AUTO: 32.3 % (ref 37–48.5)
HGB BLD-MCNC: 10.4 G/DL (ref 12–16)
IMM GRANULOCYTES # BLD AUTO: 0.06 K/UL (ref 0–0.04)
IMM GRANULOCYTES NFR BLD AUTO: 0.7 % (ref 0–0.5)
LYMPHOCYTES # BLD AUTO: 1.1 K/UL (ref 1–4.8)
LYMPHOCYTES NFR BLD: 12.5 % (ref 18–48)
MCH RBC QN AUTO: 31.4 PG (ref 27–31)
MCHC RBC AUTO-ENTMCNC: 32.2 G/DL (ref 32–36)
MCV RBC AUTO: 98 FL (ref 82–98)
MONOCYTES # BLD AUTO: 0.6 K/UL (ref 0.3–1)
MONOCYTES NFR BLD: 6.7 % (ref 4–15)
NEUTROPHILS # BLD AUTO: 6.1 K/UL (ref 1.8–7.7)
NEUTROPHILS NFR BLD: 71.5 % (ref 38–73)
NRBC BLD-RTO: 0 /100 WBC
PLATELET # BLD AUTO: 199 K/UL (ref 150–450)
PMV BLD AUTO: 10.1 FL (ref 9.2–12.9)
POTASSIUM SERPL-SCNC: 4.4 MMOL/L (ref 3.5–5.1)
PROT SERPL-MCNC: 7 G/DL (ref 6–8.4)
RBC # BLD AUTO: 3.31 M/UL (ref 4–5.4)
SODIUM SERPL-SCNC: 136 MMOL/L (ref 136–145)
WBC # BLD AUTO: 8.49 K/UL (ref 3.9–12.7)

## 2023-10-24 PROCEDURE — 99214 PR OFFICE/OUTPT VISIT, EST, LEVL IV, 30-39 MIN: ICD-10-PCS | Mod: S$GLB,,, | Performed by: FAMILY MEDICINE

## 2023-10-24 PROCEDURE — 1160F PR REVIEW ALL MEDS BY PRESCRIBER/CLIN PHARMACIST DOCUMENTED: ICD-10-PCS | Mod: CPTII,S$GLB,, | Performed by: FAMILY MEDICINE

## 2023-10-24 PROCEDURE — 1159F MED LIST DOCD IN RCRD: CPT | Mod: CPTII,S$GLB,, | Performed by: FAMILY MEDICINE

## 2023-10-24 PROCEDURE — 83036 HEMOGLOBIN GLYCOSYLATED A1C: CPT | Performed by: FAMILY MEDICINE

## 2023-10-24 PROCEDURE — 3078F PR MOST RECENT DIASTOLIC BLOOD PRESSURE < 80 MM HG: ICD-10-PCS | Mod: CPTII,S$GLB,, | Performed by: FAMILY MEDICINE

## 2023-10-24 PROCEDURE — 36415 COLL VENOUS BLD VENIPUNCTURE: CPT | Performed by: FAMILY MEDICINE

## 2023-10-24 PROCEDURE — 99999 PR PBB SHADOW E&M-EST. PATIENT-LVL III: ICD-10-PCS | Mod: PBBFAC,,, | Performed by: FAMILY MEDICINE

## 2023-10-24 PROCEDURE — 3074F SYST BP LT 130 MM HG: CPT | Mod: CPTII,S$GLB,, | Performed by: FAMILY MEDICINE

## 2023-10-24 PROCEDURE — 3078F DIAST BP <80 MM HG: CPT | Mod: CPTII,S$GLB,, | Performed by: FAMILY MEDICINE

## 2023-10-24 PROCEDURE — 80053 COMPREHEN METABOLIC PANEL: CPT | Performed by: FAMILY MEDICINE

## 2023-10-24 PROCEDURE — 99999 PR PBB SHADOW E&M-EST. PATIENT-LVL III: CPT | Mod: PBBFAC,,, | Performed by: FAMILY MEDICINE

## 2023-10-24 PROCEDURE — 1159F PR MEDICATION LIST DOCUMENTED IN MEDICAL RECORD: ICD-10-PCS | Mod: CPTII,S$GLB,, | Performed by: FAMILY MEDICINE

## 2023-10-24 PROCEDURE — 85025 COMPLETE CBC W/AUTO DIFF WBC: CPT | Performed by: FAMILY MEDICINE

## 2023-10-24 PROCEDURE — 1160F RVW MEDS BY RX/DR IN RCRD: CPT | Mod: CPTII,S$GLB,, | Performed by: FAMILY MEDICINE

## 2023-10-24 PROCEDURE — 99214 OFFICE O/P EST MOD 30 MIN: CPT | Mod: S$GLB,,, | Performed by: FAMILY MEDICINE

## 2023-10-24 PROCEDURE — 3074F PR MOST RECENT SYSTOLIC BLOOD PRESSURE < 130 MM HG: ICD-10-PCS | Mod: CPTII,S$GLB,, | Performed by: FAMILY MEDICINE

## 2023-10-24 NOTE — PROGRESS NOTES
Subjective:       Patient ID: Myesha Quinones is a 83 y.o. female.    Chief Complaint: Follow-up    83 YR OLD PLEASANT FEMALE WITH DM II, CKD V, CHF W/GERALDINE, COPD, DEPRESSION AND OTHER CO MORBIDITIES PRESENTS TODAY FOR 3 month follow up. She is in CKD 5 and on peritoneal dialysis two times a week. Follows nepohrology and need some referrals.    DM II - CONTROLLED - RECENT A1C WENT UP SINCE SHE TOOK STEROIDS IN HOSPITAL - BUT SHE FOLLOWING ENDO AND ON INSULIN and trulicity -   HGBA1C                   5.6                 07/06/2023                             CHF WITH NORMAL EF - FOLLOWS CARDIOLOGY - ON ASA    HTN - CONTROLLED    CKD V - FOLLOWS NEPHROLOGY - NO NEW ISSUES - on peritoneal dialysis - stable      HISTORY AS BELOW - REVIEWED IN DETAIL        Follow-up  Associated symptoms include fatigue and weakness. Pertinent negatives include no arthralgias, chest pain, congestion, diaphoresis, headaches, myalgias, nausea or sore throat.   Medication Refill  Associated symptoms include fatigue and weakness. Pertinent negatives include no arthralgias, chest pain, congestion, diaphoresis, headaches, myalgias, nausea or sore throat.   Diabetes  She presents for her follow-up diabetic visit. She has type 2 diabetes mellitus. Her disease course has been stable. There are no hypoglycemic associated symptoms. Pertinent negatives for hypoglycemia include no confusion, dizziness, headaches, nervousness/anxiousness, pallor, seizures, speech difficulty or tremors. Associated symptoms include fatigue and weakness. Pertinent negatives for diabetes include no chest pain, no polydipsia and no polyuria. There are no hypoglycemic complications. Symptoms are stable. Diabetic complications include heart disease. Risk factors for coronary artery disease include diabetes mellitus, hypertension, post-menopausal and sedentary lifestyle. Current diabetic treatment includes insulin injections and oral agent (monotherapy). She is compliant  with treatment all of the time. She rarely participates in exercise. An ACE inhibitor/angiotensin II receptor blocker is being taken. She does not see a podiatrist.Eye exam is current.     Review of Systems   Constitutional:  Positive for fatigue. Negative for activity change, diaphoresis and unexpected weight change.   HENT: Negative.  Negative for nasal congestion, ear discharge, hearing loss, rhinorrhea, sore throat and voice change.    Eyes: Negative.  Negative for pain, discharge and visual disturbance.   Respiratory: Negative.  Negative for chest tightness, shortness of breath and wheezing.    Cardiovascular: Negative.  Negative for chest pain.   Gastrointestinal: Negative.  Negative for abdominal distention, anal bleeding, constipation and nausea.   Endocrine: Negative.  Negative for cold intolerance, polydipsia and polyuria.   Genitourinary: Negative.  Negative for decreased urine volume, difficulty urinating, dysuria, frequency, menstrual problem and vaginal pain.   Musculoskeletal: Negative.  Negative for arthralgias, gait problem and myalgias.   Integumentary:  Negative for color change, pallor and wound. Negative.   Allergic/Immunologic: Negative.  Negative for environmental allergies and immunocompromised state.   Neurological:  Positive for weakness. Negative for dizziness, tremors, seizures, speech difficulty and headaches.   Hematological: Negative.  Negative for adenopathy. Does not bruise/bleed easily.   Psychiatric/Behavioral: Negative.  Negative for agitation, confusion, decreased concentration, hallucinations, self-injury and suicidal ideas. The patient is not nervous/anxious.          Past Medical History:   Diagnosis Date    Acute hypoxemic respiratory failure 12/19/2019    Acute right-sided thoracic back pain 12/09/2019    Allergy     Asthma     Basal cell carcinoma     left forehead    Basal cell carcinoma     left nose    Basal cell carcinoma 05/20/2015    right nose    Basal cell  carcinoma 12/22/2015    left lower post neck    Basal cell carcinoma 12/03/2019    left ant scalp     BCC (basal cell carcinoma of skin) 10/20/2022    Right alar groove    Bilateral pleural effusion     Bilateral renal cysts     Breast cancer     CAD (coronary artery disease)     Cardiomyopathy     Cardiomyopathy, ischemic     Cataract     CHF (congestive heart failure)     CKD (chronic kidney disease) stage 4, GFR 15-29 ml/min     Colon polyp 2011    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, with long-term current use of insulin 02/22/2018    COPD (chronic obstructive pulmonary disease)     COPD exacerbation 04/08/2018    Current mild episode of major depressive disorder without prior episode 01/25/2022    Defibrillator discharge     Dependence on renal dialysis 08/22/2023    Diabetes mellitus     Diabetes mellitus type II     Diabetes with neurologic complications     Edema     ESRD needing dialysis     Goiter     MNG    Hematuria, unspecified     HX: breast cancer     Hyperlipidemia     Hypertension     Hypocalcemia     Hypokalemia     Hyponatremia     Hyponatremia 04/02/2022    Iron deficiency anemia 05/16/2017    Iron deficiency anemia     Iron deficiency anemia     Left kidney mass     Meningioma     Microalbuminuria due to type 2 diabetes mellitus 01/26/2022    Osteoporosis, postmenopausal     Pneumonia 12/08/2019    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    Proteinuria 01/21/2019    Pseudomonas pneumonia     SCC (squamous cell carcinoma) 08/25/2022    right posterior neck    Skin cancer     s/p excision    Sleep apnea     CPAP    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/2015    mid forehead    Unspecified vitamin D deficiency     UTI (urinary tract infection) 04/02/2022    Ventricular tachycardia     Vitamin B12 deficiency     Vitamin D deficiency disease        Past Surgical History:    Procedure Laterality Date    BASAL CELL CARCINOMA EXCISION      posterior neck and nose    BREAST BIOPSY      BREAST CYST EXCISION Left     BREAST SURGERY      CARDIAC DEFIBRILLATOR PLACEMENT      x 2    CATARACT EXTRACTION W/  INTRAOCULAR LENS IMPLANT Bilateral     CHOLECYSTECTOMY      COLONOSCOPY N/A 11/5/2019    Procedure: COLONOSCOPY;  Surgeon: Boaz Botello MD;  Location: [facility] ([address] [place] [address]);  Service: Endoscopy;  Laterality: N/A;  AICD - Medtronic - sm    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    FRACTURE SURGERY      left elbow and wrist as a child    HYSTERECTOMY      INSERTION, CATHETER, DIALYSIS, PERITONEAL, LAPAROSCOPIC N/A 4/25/2023    Procedure: INSERTION, CATHETER, DIALYSIS, PERITONEAL, LAPAROSCOPIC;  Surgeon: Marcelo Isaac MD;  Location: [facility];  Service: General;  Laterality: N/A;    LAPAROSCOPIC LYSIS OF ADHESIONS N/A 4/25/2023    Procedure: LYSIS, ADHESIONS, LAPAROSCOPIC;  Surgeon: Marcelo Isaac MD;  Location: [facility] OR;  Service: General;  Laterality: N/A;    MASTECTOMY Right     OMENTOPEXY, LAPAROSCOPIC N/A 4/25/2023    Procedure: OMENTOPEXY, LAPAROSCOPIC;  Surgeon: Marcelo Isaac MD;  Location: [facility];  Service: General;  Laterality: N/A;    REPLACEMENT OF IMPLANTABLE CARDIOVERTER-DEFIBRILLATOR (ICD) GENERATOR N/A 12/17/2018    Procedure: REPLACEMENT, ICD GENERATOR;  Surgeon: Jan Mckeon MD;  Location: [facility];  Service: Cardiology;  Laterality: N/A;  DONNA, CRT-D gen change, MDT, MAC, SK, 3 Prep    REVISION OF SKIN POCKET FOR CARDIOVERTER-DEFIBRILLATOR  12/17/2018    Procedure: Revision, Skin Pocket, For Cardioverter-Defibrillator;  Surgeon: Jan Mckeon MD;  Location: [facility];  Service: Cardiology;;    SQUAMOUS CELL CARCINOMA EXCISION      remved from forehead    TONSILLECTOMY         Family History   Problem Relation Age of Onset    Asthma Mother     Hypertension Mother     Stroke Mother     Diabetes Father     Cardiomyopathy Father      Diabetes Sister     Heart disease Sister     Heart attack Sister     Heart attack Brother     Diabetes Brother     Heart disease Brother     Hypertension Brother     Diabetes Brother     Heart disease Brother     Hypertension Brother     Cerebral aneurysm Brother     Diabetes Brother     Heart disease Brother     Cancer Brother         colon    Diabetes Brother     Diabetes Daughter         prediabetes    Cancer Daughter         melanoma    Obesity Daughter     Melanoma Daughter     Cancer Son         skin    Diabetes Son         prediabetes    Diabetes Son     No Known Problems Maternal Grandmother     No Known Problems Maternal Grandfather     No Known Problems Paternal Grandmother     No Known Problems Paternal Grandfather     Amblyopia Neg Hx     Blindness Neg Hx     Cataracts Neg Hx     Glaucoma Neg Hx     Macular degeneration Neg Hx     Retinal detachment Neg Hx     Strabismus Neg Hx     Thyroid disease Neg Hx        Social History     Socioeconomic History    Marital status:    Occupational History    Occupation: Homemaker     Employer: OTHER   Tobacco Use    Smoking status: Never     Passive exposure: Never    Smokeless tobacco: Never   Substance and Sexual Activity    Alcohol use: No     Alcohol/week: 0.0 standard drinks of alcohol    Drug use: No    Sexual activity: Yes     Partners: Male   Other Topics Concern    Are you pregnant or think you may be? No    Breast-feeding No     Social Determinants of Health     Financial Resource Strain: Low Risk  (8/22/2023)    Overall Financial Resource Strain (CARDIA)     Difficulty of Paying Living Expenses: Not hard at all   Food Insecurity: No Food Insecurity (8/22/2023)    Hunger Vital Sign     Worried About Running Out of Food in the Last Year: Never true     Ran Out of Food in the Last Year: Never true   Transportation Needs: No Transportation Needs (8/22/2023)    PRAPARE - Transportation     Lack of  Transportation (Medical): No     Lack of Transportation (Non-Medical): No   Physical Activity: Inactive (8/22/2023)    Exercise Vital Sign     Days of Exercise per Week: 0 days     Minutes of Exercise per Session: 0 min   Stress: No Stress Concern Present (8/22/2023)    [employer] [place] [employer] - Occupational Stress Questionnaire     Feeling of Stress : Only a little   Social Connections: Moderately Isolated (8/22/2023)    Social Connection and Isolation Panel [NHANES]     Frequency of Communication with Friends and Family: More than three times a week     Frequency of Social Gatherings with Friends and Family: More than three times a week     Attends [employer] Services: More than 4 times per year     Active Member of Clubs or Organizations: No     Attends Club or Organization Meetings: Never     Marital Status:    Housing Stability: Low Risk  (8/22/2023)    Housing Stability Vital Sign     Unable to Pay for Housing in the Last Year: No     Number of Places Lived in the Last Year: 1     Unstable Housing in the Last Year: No       Current Outpatient Medications   Medication Sig Dispense Refill    acetaminophen (TYLENOL) 500 MG tablet Take 500 mg by mouth as needed.      albuterol (PROVENTIL/VENTOLIN HFA) 90 mcg/actuation inhaler Inhale 2 puffs into the lungs every 6 (six) hours as needed for Wheezing. Rescue 18 g 12    albuterol-ipratropium (DUO-NEB) 2.5 mg-0.5 mg/3 mL nebulizer solution Take 3 mLs by nebulization every 4 (four) hours as needed for Wheezing. 270 mL 12    aspirin 81 MG Chew Take 81 mg by mouth once daily.      carvediloL (COREG) 25 MG tablet TAKE 2 TABLETS (50 MG TOTAL) BY MOUTH 2 (TWO) TIMES DAILY. 360 tablet 3    cholecalciferol, vitamin D3, (VITAMIN D3) 50 mcg (2,000 unit) Tab Take 1 tablet by mouth once daily.       cyanocobalamin (VITAMIN B-12) 100 MCG tablet Take 100 mcg by mouth once daily.      dulaglutide (TRULICITY) 1.5 mg/0.5 mL pen injector  "Inject 1.5 mg into the skin every 7 days. 4 pen 11    epoetin jamaica-epbx (RETACRIT) 10,000 unit/mL imjection Inject 1 mL (10,000 Units total) into the skin every 30 days. 1 mL 11    ergocalciferol (ERGOCALCIFEROL) 50,000 unit Cap Take 50,000 Units by mouth every 7 days.      estradioL (ESTRACE) 0.01 % (0.1 mg/gram) vaginal cream Place 1 g vaginally every other day. 42.5 g 11    fluticasone propionate (FLONASE) 50 mcg/actuation nasal spray 1 spray (50 mcg total) by Each Nostril route 2 (two) times a day. 11.1 mL 3    furosemide (LASIX) 40 MG tablet Take 1 tablet (40 mg total) by mouth once daily. 90 tablet 3    hydrALAZINE (APRESOLINE) 100 MG tablet Take 1 tablet (100 mg total) by mouth 2 (two) times daily. 90 tablet 3    insulin (LANTUS SOLOSTAR U-100 INSULIN) glargine 100 units/mL SubQ pen Inject 10 Units into the skin every evening. 30 mL 3    lancets (ACCU-CHEK SOFTCLIX LANCETS) Misc Uses Accu-Chek Angelica meter to test BG 2x/day 400 each 6    lisinopriL (PRINIVIL,ZESTRIL) 5 MG tablet Take 5 mg by mouth once daily.      montelukast (SINGULAIR) 10 mg tablet Take 1 tablet (10 mg total) by mouth once daily. 90 tablet 3    nitroGLYCERIN (NITROSTAT) 0.4 MG SL tablet Place 0.4 mg under the tongue every 5 (five) minutes as needed for Chest pain.       omega-3 fatty acids 500 mg Cap Take 1 capsule by mouth Daily.      pen needle, diabetic (BD ULTRA-FINE MINI PEN NEEDLE) 31 gauge x 3/16" Ndle Use with insulin twice a day. 400 each 3    pravastatin (PRAVACHOL) 40 MG tablet Take 1 tablet (40 mg total) by mouth once daily. 90 tablet 3    sevelamer carbonate (RENVELA) 800 mg Tab Take 800 mg by mouth 3 (three) times daily.      sodium bicarbonate 650 MG tablet Take 2 tablets (1,300 mg total) by mouth 2 (two) times daily. 360 tablet 3    valsartan (DIOVAN) 80 MG tablet TAKE 1 TABLET BY MOUTH 2 TIMES DAILY. 180 tablet 3    blood sugar diagnostic (ACCU-CHEK ANGELICA) Strp Uses Accu-Check Angelica meter to test BG 4x/day " 400 strip 6     No current facility-administered medications for this visit.       Review of patient's allergies indicates:   Allergen Reactions    Iodine and iodide containing products Hives and Other (See Comments)     Not specified     Nifedipine      weakness       Objective:      Vitals:    10/24/23 1108   BP: 120/70   Pulse: 79   Temp: 98 °F (36.7 °C)       Physical Exam  Constitutional:       General: She is not in acute distress.     Appearance: She is well-developed. She is not diaphoretic.   HENT:      Head: Normocephalic and atraumatic.      Right Ear: External ear normal.      Left Ear: External ear normal.      Nose: Nose normal.      Mouth/Throat:      Pharynx: No oropharyngeal exudate.   Eyes:      General: No scleral icterus.        Right eye: No discharge.         Left eye: No discharge.      Conjunctiva/sclera: Conjunctivae normal.      Pupils: Pupils are equal, round, and reactive to light.   Neck:      Thyroid: No thyromegaly.      Vascular: No JVD.      Trachea: No tracheal deviation.   Cardiovascular:      Rate and Rhythm: Normal rate and regular rhythm.      Heart sounds: Normal heart sounds. No murmur heard.     No friction rub. No gallop.   Pulmonary:      Effort: Pulmonary effort is normal.      Breath sounds: Normal breath sounds. No stridor. No wheezing or rales.   Chest:      Chest wall: No tenderness.   Abdominal:      General: Bowel sounds are normal. There is no distension.      Palpations: Abdomen is soft. There is no mass.      Tenderness: There is no abdominal tenderness. There is no guarding or rebound.      Hernia: No hernia is present.   Musculoskeletal:         General: No tenderness. Normal range of motion.      Cervical back: Normal range of motion and neck supple.   Lymphadenopathy:      Cervical: No cervical adenopathy.   Skin:     General: Skin is warm and dry.      Coloration: Skin is not pale.      Findings: No erythema or rash.   Neurological:      Mental Status: She  is alert and oriented to person, place, and time.      Cranial Nerves: No cranial nerve deficit.      Motor: No abnormal muscle tone.      Coordination: Coordination normal.      Deep Tendon Reflexes: Reflexes are normal and symmetric. Reflexes normal.   Psychiatric:         Behavior: Behavior normal.         Thought Content: Thought content normal.         Judgment: Judgment normal.       Assessment:       Problem List Items Addressed This Visit       Type 2 diabetes mellitus with stage 4 chronic kidney disease and hypertension - Primary    Peritoneal dialysis status    Relevant Orders    CBC Auto Differential (Completed)    Comprehensive Metabolic Panel (Completed)    Hemoglobin A1C (Completed)    Microalbuminuria due to type 2 diabetes mellitus    Relevant Orders    CBC Auto Differential (Completed)    Comprehensive Metabolic Panel (Completed)    Hemoglobin A1C (Completed)    Major depression, recurrent, chronic    Relevant Orders    CBC Auto Differential (Completed)    Comprehensive Metabolic Panel (Completed)    Hemoglobin A1C (Completed)    Hyperlipidemia associated with type 2 diabetes mellitus    Relevant Orders    CBC Auto Differential (Completed)    Comprehensive Metabolic Panel (Completed)    Hemoglobin A1C (Completed)    Diabetic polyneuropathy associated with type 2 diabetes mellitus    Relevant Medications    blood sugar diagnostic (ACCU-CHEK HECTOR) Strp    Chronic kidney disease (CKD), stage V    Relevant Orders    CBC Auto Differential (Completed)    Comprehensive Metabolic Panel (Completed)    Hemoglobin A1C (Completed)     Other Visit Diagnoses       Tinea pedis of both feet        Relevant Orders    Ambulatory referral/consult to Podiatry            Plan:       Myesha was seen today for follow-up.    Diagnoses and all orders for this visit:    Controlled type 2 diabetes mellitus with chronic kidney disease on chronic dialysis, with long-term current use of insulin  -     CBC Auto Differential;  Future  -     Comprehensive Metabolic Panel; Future  -     Hemoglobin A1C; Future    Major depression, recurrent, chronic  -     CBC Auto Differential; Future  -     Comprehensive Metabolic Panel; Future  -     Hemoglobin A1C; Future    Microalbuminuria due to type 2 diabetes mellitus  -     CBC Auto Differential; Future  -     Comprehensive Metabolic Panel; Future  -     Hemoglobin A1C; Future    Hyperlipidemia associated with type 2 diabetes mellitus  -     CBC Auto Differential; Future  -     Comprehensive Metabolic Panel; Future  -     Hemoglobin A1C; Future    Chronic kidney disease (CKD), stage V  -     CBC Auto Differential; Future  -     Comprehensive Metabolic Panel; Future  -     Hemoglobin A1C; Future    Peritoneal dialysis status  -     CBC Auto Differential; Future  -     Comprehensive Metabolic Panel; Future  -     Hemoglobin A1C; Future    Diabetic polyneuropathy associated with type 2 diabetes mellitus  -     blood sugar diagnostic (ACCU-CHEK HECTOR) Strp; Uses Accu-Check Hector meter to test BG 4x/day    Tinea pedis of both feet  -     Ambulatory referral/consult to Podiatry; Future    DM II  -controlled    HTN  -controlled   -MONITOR  -dc diltiazem since on coreg already    CKD STAGE 4  -FOLLOW NEPHROLOGY  -ER AND NEPHRO PRECAUTIONS GIVEN  -continue PD    Depression  -controlled    COPD  -stable    Spent adequate time in obtaining history and explaining differentials    30 minutes spent during this visit of which greater than 50% devoted to face-face counseling and coordination of care regarding diagnosis and management plan      Follow up in about 6 months (around 4/24/2024), or if symptoms worsen or fail to improve.

## 2023-10-25 ENCOUNTER — TELEPHONE (OUTPATIENT)
Dept: ADMINISTRATIVE | Facility: OTHER | Age: 83
End: 2023-10-25
Payer: MEDICARE

## 2023-10-25 LAB
ESTIMATED AVG GLUCOSE: 94 MG/DL (ref 68–131)
FINAL PATHOLOGIC DIAGNOSIS: NORMAL
GROSS: NORMAL
HBA1C MFR BLD: 4.9 % (ref 4–5.6)
Lab: NORMAL
MICROSCOPIC EXAM: NORMAL

## 2023-10-25 NOTE — PROGRESS NOTES
Recommended patient to Dr. Roche for Mohs surgery consultation (near eye). Picture in chart.    General Derm team: Sent patient for Mohs surgery. F/u with me in 6 months.

## 2023-10-30 ENCOUNTER — CLINICAL SUPPORT (OUTPATIENT)
Dept: CARDIOLOGY | Facility: HOSPITAL | Age: 83
End: 2023-10-30
Payer: MEDICARE

## 2023-10-30 DIAGNOSIS — Z95.810 PRESENCE OF AUTOMATIC (IMPLANTABLE) CARDIAC DEFIBRILLATOR: ICD-10-CM

## 2023-10-30 PROCEDURE — 93296 REM INTERROG EVL PM/IDS: CPT | Performed by: INTERNAL MEDICINE

## 2023-10-30 PROCEDURE — 93295 CARDIAC DEVICE CHECK - REMOTE: ICD-10-PCS | Mod: ,,, | Performed by: INTERNAL MEDICINE

## 2023-10-30 PROCEDURE — 93295 DEV INTERROG REMOTE 1/2/MLT: CPT | Mod: ,,, | Performed by: INTERNAL MEDICINE

## 2023-10-31 ENCOUNTER — TELEPHONE (OUTPATIENT)
Dept: DERMATOLOGY | Facility: CLINIC | Age: 83
End: 2023-10-31
Payer: MEDICARE

## 2023-10-31 NOTE — TELEPHONE ENCOUNTER
----- Message from Selin Reyes sent at 10/31/2023  1:35 PM CDT -----  Our nurse will place a note in the chart.  ----- Message -----  From: Alyssa Stovall RN  Sent: 10/31/2023   1:29 PM CDT  To: Manda Villasenor RN; Selin Reyes    Yes, I think if dependant they should be reprogrammed.    ----- Message -----  From: Manda Villasenor RN  Sent: 10/27/2023   5:45 PM CDT  To: Alyssa Stovall RN; Selin Reyes    CHB, do they need to be reprogrammed?  I didn't put a note in.  ----- Message -----  From: Selin Reyes  Sent: 10/27/2023   2:25 PM CDT  To: Manda Villasenor RN      ----- Message -----  From: Rita Meeks RN  Sent: 10/27/2023   2:19 PM CDT  To: [facility] Arrhythmia Device Staff    Good afternoon! This patient is having a Mohs procedure with Dr. Roche on 12/6 on her nose. Pt states she has both a pacemaker and defibrillator. I'm messaging for clearance to use a magnet over the pt's device while using electrocautery. Thanks!    Rita

## 2023-11-02 ENCOUNTER — TELEPHONE (OUTPATIENT)
Dept: OTOLARYNGOLOGY | Facility: CLINIC | Age: 83
End: 2023-11-02
Payer: MEDICARE

## 2023-11-02 NOTE — TELEPHONE ENCOUNTER
----- Message from Lesley Gomez MA sent at 11/2/2023  9:28 AM CDT -----  Anything on 11/29 about 900 or 1000  ----- Message -----  From: Lesley Gomez MA  Sent: 11/1/2023   1:50 PM CDT  To: Lesley Gomez MA      ----- Message -----  From: Carla Garces AU.D  Sent: 10/31/2023  12:44 PM CDT  To: Lesley Gomez MA    Please call patient's son, Macho, @ [phone] to advise that I am back in the office and can see her on 11/6/23 at 1:30 pm - will that work for them?    Thanks in advance,   Dr. Garces

## 2023-11-02 NOTE — TELEPHONE ENCOUNTER
----- Message from Lesley Gomez MA sent at 11/1/2023  1:50 PM CDT -----    ----- Message -----  From: Carla Garces AU.D  Sent: 10/31/2023  12:44 PM CDT  To: Lesley Gomez MA    Please call patient's son, Macho, @ [phone] to advise that I am back in the office and can see her on 11/6/23 at 1:30 pm - will that work for them?    Thanks in advance,   Dr. Garces

## 2023-11-06 ENCOUNTER — CLINICAL SUPPORT (OUTPATIENT)
Dept: OTOLARYNGOLOGY | Facility: CLINIC | Age: 83
End: 2023-11-06
Payer: MEDICARE

## 2023-11-06 DIAGNOSIS — Z46.1 ENCOUNTER FOR FITTING AND ADJUSTMENT OF HEARING AID OF BOTH EARS: Primary | ICD-10-CM

## 2023-11-06 PROCEDURE — 99499 NO LOS: ICD-10-PCS | Mod: ,,, | Performed by: AUDIOLOGIST-HEARING AID FITTER

## 2023-11-06 PROCEDURE — 99499 UNLISTED E&M SERVICE: CPT | Mod: ,,, | Performed by: AUDIOLOGIST-HEARING AID FITTER

## 2023-11-06 NOTE — PROGRESS NOTES
Susannah Carter, CCC-A  Audiologist - [facility] [address] [place] [address]  [email or web address]  [phone]    Patient: Myesha Quinones   MRN: [id number]  [address]  Home Phone [phone]   Work Phone [phone]   Mobile [phone]   : 1940  PELAYO: 2023      HEARING AID FOLLOW-UP      Myesha Quinones is here today with her son, Macho, to  her new Left earmold #[id number] to replace her defective encased mold with faulty wire (#[id number]).  Opened 23 session and connected devices.  Calibrated Left hearing aid only and then patient confirmed feels and sounds good in the office.  Saved without any other changes to settings.  Verified devices connected to phone via Bluetooth in office.  Myesha Quinones verbalized understanding and satisfaction with today's visit.  She will call if service is needed before her next appointment for an updated audiogram and hearing aid clean/check/reprogramming.    ReSound -ESQY  #[id number] (R)  #[id number] (L)  Warranty expires: 10/24/2024    _______________________________  Susannah Carter, WINDY-A  Audiologist

## 2023-11-07 ENCOUNTER — TELEPHONE (OUTPATIENT)
Dept: DERMATOLOGY | Facility: CLINIC | Age: 83
End: 2023-11-07
Payer: MEDICARE

## 2023-11-07 ENCOUNTER — DOCUMENTATION ONLY (OUTPATIENT)
Dept: ELECTROPHYSIOLOGY | Facility: CLINIC | Age: 83
End: 2023-11-07
Payer: MEDICARE

## 2023-11-07 NOTE — PROGRESS NOTES
Patient has been identified as having an implanted cardiac rhythm device (CRD); the implanted device is a Medtronic defibrillator.      Pacer dependency has been noted.       Pt is having a Mohs procedure with Dr. Roche on 12/6/23    DEFIBRILLATORS/DEPENDENT ANY LOCATION -- STOP!!!!  Reprogramming of this device may be required please contact the Arrhythmia Department at Ext [phone] prior to the procedure for reprogramming.

## 2023-11-14 ENCOUNTER — PATIENT MESSAGE (OUTPATIENT)
Dept: ADMINISTRATIVE | Facility: HOSPITAL | Age: 83
End: 2023-11-14
Payer: MEDICARE

## 2023-12-03 DIAGNOSIS — Z46.89 ENCOUNTER FOR FITTING AND ADJUSTMENT OF PESSARY: ICD-10-CM

## 2023-12-03 DIAGNOSIS — N95.2 POSTMENOPAUSE ATROPHIC VAGINITIS: ICD-10-CM

## 2023-12-03 DIAGNOSIS — R33.9 URINARY RETENTION: ICD-10-CM

## 2023-12-04 RX ORDER — ESTRADIOL 0.1 MG/G
CREAM VAGINAL
Qty: 42.5 G | Refills: 3 | Status: SHIPPED | OUTPATIENT
Start: 2023-12-04

## 2023-12-15 ENCOUNTER — TELEPHONE (OUTPATIENT)
Dept: DERMATOLOGY | Facility: CLINIC | Age: 83
End: 2023-12-15
Payer: MEDICARE

## 2023-12-15 NOTE — TELEPHONE ENCOUNTER
Pt sent a message stating that she wanted to cancel her Mohs sx 12/18/23 for 7:30, b/c she does not see anything on her nose. Spoke to pt and explain to her that b/c she does not have anything on the surface it is the margins underneath that must be clear. Pt stated that she will keep her appt and appreciated the phone call.

## 2023-12-18 ENCOUNTER — PROCEDURE VISIT (OUTPATIENT)
Dept: DERMATOLOGY | Facility: CLINIC | Age: 83
End: 2023-12-18
Payer: MEDICARE

## 2023-12-18 VITALS
WEIGHT: 129 LBS | SYSTOLIC BLOOD PRESSURE: 168 MMHG | DIASTOLIC BLOOD PRESSURE: 69 MMHG | HEIGHT: 63 IN | HEART RATE: 64 BPM | BODY MASS INDEX: 22.86 KG/M2

## 2023-12-18 DIAGNOSIS — C44.311 BASAL CELL CARCINOMA OF NOSE: Primary | ICD-10-CM

## 2023-12-18 PROCEDURE — 13151 PR RECMPL WND LID,NOS,EAR 1.1-2.5 CM: ICD-10-PCS | Mod: 51,S$GLB,, | Performed by: DERMATOLOGY

## 2023-12-18 PROCEDURE — 17312 MOHS ADDL STAGE: CPT | Mod: S$GLB,,, | Performed by: DERMATOLOGY

## 2023-12-18 PROCEDURE — 17312: ICD-10-PCS | Mod: S$GLB,,, | Performed by: DERMATOLOGY

## 2023-12-18 PROCEDURE — 17311 MOHS 1 STAGE H/N/HF/G: CPT | Mod: S$GLB,,, | Performed by: DERMATOLOGY

## 2023-12-18 PROCEDURE — 99499 NO LOS: ICD-10-PCS | Mod: S$GLB,,, | Performed by: DERMATOLOGY

## 2023-12-18 PROCEDURE — 13151 CMPLX RPR E/N/E/L 1.1-2.5 CM: CPT | Mod: 51,S$GLB,, | Performed by: DERMATOLOGY

## 2023-12-18 PROCEDURE — 99499 UNLISTED E&M SERVICE: CPT | Mod: S$GLB,,, | Performed by: DERMATOLOGY

## 2023-12-18 PROCEDURE — 17311: ICD-10-PCS | Mod: S$GLB,,, | Performed by: DERMATOLOGY

## 2023-12-18 RX ORDER — CEPHALEXIN 500 MG/1
500 CAPSULE ORAL EVERY 8 HOURS
Qty: 21 CAPSULE | Refills: 0 | Status: SHIPPED | OUTPATIENT
Start: 2023-12-18 | End: 2023-12-25

## 2023-12-18 NOTE — PROGRESS NOTES
PROCEDURE: Mohs' Micrographic Surgery    INDICATION: Location in mask areas of face including central face, nose, eyelids, eyebrows, lips, chin, preauricular, temple, and ear. Biopsy-proven skin cancer of cosmetically and functionally important areas, including head, neck, genital, hand, foot, or areas known for having difficulty in healing, such as the lower anterior legs. Tumor with ill-defined borders.    REFERRING PROVIDER:  Adriana Sneed MD    CASE NUMBER:     ANESTHETIC: 3.5 cc 0.5% Lidocaine with Epi 1:200,000 mixed 1:1 with 0.5% Bupivacaine    SURGICAL PREP: Hibiclens    SURGEON: Diaz Roche MD    ASSISTANTS: Inez Gray PA-C and Delmy Bauer MA    PREOPERATIVE DIAGNOSIS: basal cell carcinoma- infundibulocystic    POSTOPERATIVE DIAGNOSIS: basal cell carcinoma- nodular    PATHOLOGIC DIAGNOSIS: basal cell carcinoma- infundibulocystic, nodular    HISTOLOGY OF SPECIMENS IN FIRST STAGE:   Debulking tumor confirms nodular basal cell carcinoma.  Tumor Type: Tumor seen. Nodular basal cell carcinoma: Nodular tumor in dermis composed of basaloid cells exhibiting peripheral palisading and retraction artifact. Also infundibulocystic BCC noted.   Depth of Invasion: epidermis and dermis  Perineural Invasion: No    HISTOLOGY OF SPECIMENS IN SUBSEQUENT STAGES:  Tumor Type: Tumor seen. Same as in first stage.   Depth of Invasion: epidermis and dermis  Perineural Invasion: No    STAGES OF MOHS' SURGERY PERFORMED: 3    TUMOR-FREE PLANE ACHIEVED: Yes    HEMOSTASIS: thermal cautery and 4-0 Vicryl suture ties.     SPECIMENS: 5 (2 in stage A, 2 in stage B, and 1 in stage C)    LOCATION: right nasal bridge. Location verified with Dr. Sneed's clinical photograph. Patient also verified location with hand held mirror.    INITIAL LESION SIZE: 0.4 x 0.4 cm    FINAL DEFECT SIZE: 0.8 x 1.2 cm    WOUND REPAIR/DISPOSITION: The patient tolerated Mohs' Micrographic Surgery for a basal cell carcinoma very well. When the tumor  "was completely removed, a repair of the surgical defect was undertaken.      PROCEDURE: Complex Linear Repair    INDICATION: Status post Mohs' Micrographic Surgery for basal cell carcinoma.    CASE NUMBER:     SURGEON: Diaz Roche MD    ASSISTANTS: Inez Gray PA-C and Delmy Bauer MA    ANESTHETIC: 1 cc 1% Lidocaine with Epinephrine 1:100,000    SURGICAL PREP: Hibiclens, prepped by Delmy Bauer MA    LOCATION: right nasal bridge    DEFECT SIZE: 0.8 x 1.2 cm    WOUND REPAIR/DISPOSITION:  After the patient's carcinoma had been completely removed with Mohs' Micrographic Surgery, a repair of the surgical defect was undertaken. The patient was returned to the operating suite where the area of right nasal bridge was prepped, draped, and anesthetized in the usual sterile fashion. The wound was widely undermined in all directions. The wound was undermined to a distance at least the maximum width of the defect as measured perpendicular to the closure line along at least one entire edge of the defect, in this case 1.5 cm. Then, thermal cautery was used to obtain meticulous hemostasis. 5-0 Vicryl buried vertical mattress sutures were placed into the subcutaneous and dermal plane to close the wound and giles the cutaneous wound edge. Bilateral dog ears were identified and were removed by a standard Burow's triangle technique. The cutaneous wound edges were closed using interrupted 6-0 Prolene suture.    The patient tolerated the procedure well.    The area was cleaned and dressed appropriately and the patient was given wound care instructions, as well as appointment for follow-up evaluation and suture removal in 7 days.    LENGTH OF REPAIR: 2 cm    Vitals:    12/18/23 0727 12/18/23 1132   BP: (!) 130/58 (!) 168/69   BP Location: Left arm    Patient Position: Sitting    BP Method: Medium (Automatic)    Pulse: 72 64   Weight: 58.5 kg (129 lb)    Height: 5' 3" (1.6 m)          "

## 2023-12-26 ENCOUNTER — OFFICE VISIT (OUTPATIENT)
Dept: DERMATOLOGY | Facility: CLINIC | Age: 83
End: 2023-12-26
Payer: MEDICARE

## 2023-12-26 DIAGNOSIS — Z09 POSTOP CHECK: Primary | ICD-10-CM

## 2023-12-26 PROCEDURE — 99999 PR PBB SHADOW E&M-EST. PATIENT-LVL III: CPT | Mod: PBBFAC,,, | Performed by: DERMATOLOGY

## 2023-12-26 PROCEDURE — 1126F PR PAIN SEVERITY QUANTIFIED, NO PAIN PRESENT: ICD-10-PCS | Mod: CPTII,S$GLB,, | Performed by: DERMATOLOGY

## 2023-12-26 PROCEDURE — 1101F PT FALLS ASSESS-DOCD LE1/YR: CPT | Mod: CPTII,S$GLB,, | Performed by: DERMATOLOGY

## 2023-12-26 PROCEDURE — 1159F MED LIST DOCD IN RCRD: CPT | Mod: CPTII,S$GLB,, | Performed by: DERMATOLOGY

## 2023-12-26 PROCEDURE — 3288F PR FALLS RISK ASSESSMENT DOCUMENTED: ICD-10-PCS | Mod: CPTII,S$GLB,, | Performed by: DERMATOLOGY

## 2023-12-26 PROCEDURE — 99999 PR PBB SHADOW E&M-EST. PATIENT-LVL III: ICD-10-PCS | Mod: PBBFAC,,, | Performed by: DERMATOLOGY

## 2023-12-26 PROCEDURE — 1126F AMNT PAIN NOTED NONE PRSNT: CPT | Mod: CPTII,S$GLB,, | Performed by: DERMATOLOGY

## 2023-12-26 PROCEDURE — 1101F PR PT FALLS ASSESS DOC 0-1 FALLS W/OUT INJ PAST YR: ICD-10-PCS | Mod: CPTII,S$GLB,, | Performed by: DERMATOLOGY

## 2023-12-26 PROCEDURE — 99024 POSTOP FOLLOW-UP VISIT: CPT | Mod: S$GLB,,, | Performed by: DERMATOLOGY

## 2023-12-26 PROCEDURE — 3288F FALL RISK ASSESSMENT DOCD: CPT | Mod: CPTII,S$GLB,, | Performed by: DERMATOLOGY

## 2023-12-26 PROCEDURE — 99024 PR POST-OP FOLLOW-UP VISIT: ICD-10-PCS | Mod: S$GLB,,, | Performed by: DERMATOLOGY

## 2023-12-26 PROCEDURE — 1159F PR MEDICATION LIST DOCUMENTED IN MEDICAL RECORD: ICD-10-PCS | Mod: CPTII,S$GLB,, | Performed by: DERMATOLOGY

## 2023-12-26 NOTE — PROGRESS NOTES
83 y.o. female patient is here for suture removal following Mohs' surgery.    Patient reports no problems.    WOUND PE:  The R nasal bridge sutures intact. Wound healing well. Good skin edges. No signs or symptoms of infection.    IMPRESSION:  Healing operative site from Mohs' surgery, BCC R nasal bridge s/p Mohs with CLC, postop day #8.    PLAN:  Sutures removed today by  Rita Meeks RN . Steri-strips applied.  Continue wound care.  Keep moist with Aquaphor.  Call if any issues arise.     RTC:  In 3-6 months with  Adriana Sneed MD  for skin check or sooner if new concern arises.

## 2024-01-09 NOTE — PROGRESS NOTES
[facility] - UROGYNECOLOGY  [address] [id number]  January 10, 2024     Myesha Quinones  [id number]  1940    UROGYN FOLLOW-UP  1/10/2024     83 y.o. female,   presents for urogyn follow up. She c/o No chief complaint on file.   .     HPI from 2022:  Patient pelvic organ prolapse referred for conservative therapy with pessary patient with indwelling De La Cruz catheter.  Soft that the pessary will assist in patient in decompressing bladder.     2022  Here for pessary fitting to help improve incomplete bladder emptying due to prolapse     2022:  Patient doing well, using her vaginal estrogen cream about once weekly. Her pessary has been staying in place and comfortable, feels like she is emptying her bladder well and leaking is resolved. Denies UTI symptoms and constipation     2023:  Patient doing well, using her vaginal estrogen cream about once weekly. Her pessary has been staying in place and comfortable, feels like she is emptying her bladder well and leaking is resolved. Denies UTI symptoms and constipation. Has some urinary urgency, but never incontinence. Wears small pantyliners without leakage.      2023:  Patient has no complaints. Using vaginal estrogen cream 2 nights per week.  Denies urinary leakage and constipation and UTI symptoms. She is about to start dialysis.     23:  Here for insertion of #4 pessary instead of #5 that is causing some abrasions and very painful for patient during pessary check/removal and reinsertion. She has been using vaginal estrogen cream.     Changes from last visit:  Patient is here for PC. She has been happy with #4 pessary with support and knob. It is staying in well. She had diarrhea recently and felt the pessary coming down lower, but she pushed it back up and has had no further issues. She says she is using her vaginal estrogen cream regularly, using the applicator.     Past Medical History:    Diagnosis Date    Acute hypoxemic respiratory failure 12/19/2019    Acute right-sided thoracic back pain 12/09/2019    Allergy     Asthma     Basal cell carcinoma     left forehead    Basal cell carcinoma     left nose    Basal cell carcinoma 05/20/2015    right nose    Basal cell carcinoma 12/22/2015    left lower post neck    Basal cell carcinoma 12/03/2019    left ant scalp     BCC (basal cell carcinoma of skin) 10/20/2022    Right alar groove    Bilateral pleural effusion     Bilateral renal cysts     Breast cancer     CAD (coronary artery disease)     Cardiomyopathy     Cardiomyopathy, ischemic     Cataract     CHF (congestive heart failure)     CKD (chronic kidney disease) stage 4, GFR 15-29 ml/min     Colon polyp 2011    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, with long-term current use of insulin 02/22/2018    COPD (chronic obstructive pulmonary disease)     COPD exacerbation 04/08/2018    Current mild episode of major depressive disorder without prior episode 01/25/2022    Defibrillator discharge     Dependence on renal dialysis 08/22/2023    Diabetes mellitus     Diabetes mellitus type II     Diabetes with neurologic complications     Edema     ESRD needing dialysis     Goiter     MNG    Hematuria, unspecified     HX: breast cancer     Hyperlipidemia     Hypertension     Hypocalcemia     Hypokalemia     Hyponatremia     Hyponatremia 04/02/2022    Iron deficiency anemia 05/16/2017    Iron deficiency anemia     Iron deficiency anemia     Left kidney mass     Meningioma     Microalbuminuria due to type 2 diabetes mellitus 01/26/2022    Osteoporosis, postmenopausal     Pneumonia 12/08/2019    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    Proteinuria 01/21/2019    Pseudomonas pneumonia     SCC (squamous cell carcinoma) 08/25/2022    right posterior neck    Skin cancer     s/p excision    Sleep apnea     CPAP    Squamous cell  carcinoma 12/03/2015    mid forehead    Unspecified vitamin D deficiency     UTI (urinary tract infection) 04/02/2022    Ventricular tachycardia     Vitamin B12 deficiency     Vitamin D deficiency disease        Past Surgical History:   Procedure Laterality Date    BASAL CELL CARCINOMA EXCISION      posterior neck and nose    BREAST BIOPSY      BREAST CYST EXCISION Left     BREAST SURGERY      CARDIAC DEFIBRILLATOR PLACEMENT      x 2    CATARACT EXTRACTION W/  INTRAOCULAR LENS IMPLANT Bilateral     CHOLECYSTECTOMY      COLONOSCOPY N/A 11/5/2019    Procedure: COLONOSCOPY;  Surgeon: Boaz Botello MD;  Location: [address]);  Service: Endoscopy;  Laterality: N/A;  AICD - [employer] -     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    FRACTURE SURGERY      left elbow and wrist as a child    HYSTERECTOMY      INSERTION, CATHETER, DIALYSIS, PERITONEAL, LAPAROSCOPIC N/A 4/25/2023    Procedure: INSERTION, CATHETER, DIALYSIS, PERITONEAL, LAPAROSCOPIC;  Surgeon: Marcelo Isaac MD;  Location: [facility];  Service: General;  Laterality: N/A;    LAPAROSCOPIC LYSIS OF ADHESIONS N/A 4/25/2023    Procedure: LYSIS, ADHESIONS, LAPAROSCOPIC;  Surgeon: Marcelo Isaac MD;  Location: [facility] OR;  Service: General;  Laterality: N/A;    MASTECTOMY Right     OMENTOPEXY, LAPAROSCOPIC N/A 4/25/2023    Procedure: OMENTOPEXY, LAPAROSCOPIC;  Surgeon: Marcelo Isaac MD;  Location: [facility];  Service: General;  Laterality: N/A;    REPLACEMENT OF IMPLANTABLE CARDIOVERTER-DEFIBRILLATOR (ICD) GENERATOR N/A 12/17/2018    Procedure: REPLACEMENT, ICD GENERATOR;  Surgeon: Jan cMkeon MD;  Location: [facility] EP LAB;  Service: Cardiology;  Laterality: N/A;  DONNA, CRT-D gen sulema, MDT, MAC, SK, 3 Prep    REVISION OF SKIN POCKET FOR CARDIOVERTER-DEFIBRILLATOR  12/17/2018    Procedure: Revision, Skin Pocket, For Cardioverter-Defibrillator;  Surgeon: Jan Mckeon MD;  Location: [facility] EP LAB;  Service: Cardiology;;    SQUAMOUS CELL CARCINOMA  EXCISION      remved from forehead    TONSILLECTOMY         Family History   Problem Relation Age of Onset    Asthma Mother     Hypertension Mother     Stroke Mother     Diabetes Father     Cardiomyopathy Father     Diabetes Sister     Heart disease Sister     Heart attack Sister     Heart attack Brother     Diabetes Brother     Heart disease Brother     Hypertension Brother     Diabetes Brother     Heart disease Brother     Hypertension Brother     Cerebral aneurysm Brother     Diabetes Brother     Heart disease Brother     Cancer Brother         colon    Diabetes Brother     Diabetes Daughter         prediabetes    Cancer Daughter         melanoma    Obesity Daughter     Melanoma Daughter     Cancer Son         skin    Diabetes Son         prediabetes    Diabetes Son     No Known Problems Maternal Grandmother     No Known Problems Maternal Grandfather     No Known Problems Paternal Grandmother     No Known Problems Paternal Grandfather     Amblyopia Neg Hx     Blindness Neg Hx     Cataracts Neg Hx     Glaucoma Neg Hx     Macular degeneration Neg Hx     Retinal detachment Neg Hx     Strabismus Neg Hx     Thyroid disease Neg Hx        Social History     Socioeconomic History    Marital status:    Occupational History    Occupation: Homemaker     Employer: OTHER   Tobacco Use    Smoking status: Never     Passive exposure: Never    Smokeless tobacco: Never   Substance and Sexual Activity    Alcohol use: No     Alcohol/week: 0.0 standard drinks of alcohol    Drug use: No    Sexual activity: Yes     Partners: Male   Other Topics Concern    Are you pregnant or think you may be? No    Breast-feeding No     Social Determinants of Health     Financial Resource Strain: Low Risk  (8/22/2023)    Overall Financial Resource Strain (CARDIA)     Difficulty of Paying Living Expenses: Not hard at all   Food Insecurity: No Food Insecurity (8/22/2023)    Hunger Vital Sign     Worried About Running Out of Food in the Last Year:  Never true     Ran Out of Food in the Last Year: Never true   Transportation Needs: No Transportation Needs (8/22/2023)    PRAPARE - Transportation     Lack of Transportation (Medical): No     Lack of Transportation (Non-Medical): No   Physical Activity: Inactive (8/22/2023)    Exercise Vital Sign     Days of Exercise per Week: 0 days     Minutes of Exercise per Session: 0 min   Stress: No Stress Concern Present (8/22/2023)    [employer] - Occupational Stress Questionnaire     Feeling of Stress : Only a little   Social Connections: Moderately Isolated (8/22/2023)    Social Connection and Isolation Panel [NHANES]     Frequency of Communication with Friends and Family: More than three times a week     Frequency of Social Gatherings with Friends and Family: More than three times a week     Attends Christian Services: More than 4 times per year     Active Member of Clubs or Organizations: No     Attends Club or Organization Meetings: Never     Marital Status:    Housing Stability: Low Risk  (8/22/2023)    Housing Stability Vital Sign     Unable to Pay for Housing in the Last Year: No     Number of Places Lived in the Last Year: 1     Unstable Housing in the Last Year: No       Current Outpatient Medications   Medication Sig Dispense Refill    acetaminophen (TYLENOL) 500 MG tablet Take 500 mg by mouth as needed.      albuterol (PROVENTIL/VENTOLIN HFA) 90 mcg/actuation inhaler Inhale 2 puffs into the lungs every 6 (six) hours as needed for Wheezing. Rescue 18 g 12    albuterol-ipratropium (DUO-NEB) 2.5 mg-0.5 mg/3 mL nebulizer solution Take 3 mLs by nebulization every 4 (four) hours as needed for Wheezing. 270 mL 12    aspirin 81 MG Chew Take 81 mg by mouth once daily.      blood sugar diagnostic (ACCU-CHEK HECTOR) Strp Uses Accu-Check Hector meter to test BG 4x/day 400 strip 6    carvediloL (COREG) 25 MG tablet TAKE 2 TABLETS (50 MG TOTAL) BY MOUTH 2 (TWO) TIMES DAILY. 360 tablet 3     "cholecalciferol, vitamin D3, (VITAMIN D3) 50 mcg (2,000 unit) Tab Take 1 tablet by mouth once daily.       cyanocobalamin (VITAMIN B-12) 100 MCG tablet Take 100 mcg by mouth once daily.      dulaglutide (TRULICITY) 1.5 mg/0.5 mL pen injector Inject 1.5 mg into the skin every 7 days. 4 pen 11    epoetin [place]-epbx (RETACRIT) 10,000 unit/mL imjection Inject 1 mL (10,000 Units total) into the skin every 30 days. 1 mL 11    ergocalciferol (ERGOCALCIFEROL) 50,000 unit Cap Take 50,000 Units by mouth every 7 days.      estradioL (ESTRACE) 0.01 % (0.1 mg/gram) vaginal cream PLACE 1 GRAM VAGINALLY EVERY OTHER DAY 42.5 g 3    furosemide (LASIX) 40 MG tablet Take 1 tablet (40 mg total) by mouth once daily. 90 tablet 3    hydrALAZINE (APRESOLINE) 100 MG tablet Take 1 tablet (100 mg total) by mouth 2 (two) times daily. 90 tablet 3    insulin (LANTUS SOLOSTAR U-100 INSULIN) glargine 100 units/mL SubQ pen Inject 10 Units into the skin every evening. 30 mL 3    lancets (ACCU-CHEK SOFTCLIX LANCETS) Misc Uses Accu-Chek Angelica meter to test BG 2x/day 400 each 6    lisinopriL (PRINIVIL,ZESTRIL) 5 MG tablet Take 5 mg by mouth once daily.      montelukast (SINGULAIR) 10 mg tablet Take 1 tablet (10 mg total) by mouth once daily. 90 tablet 3    nitroGLYCERIN (NITROSTAT) 0.4 MG SL tablet Place 0.4 mg under the tongue every 5 (five) minutes as needed for Chest pain.       omega-3 fatty acids 500 mg Cap Take 1 capsule by mouth Daily.      pen needle, diabetic (BD ULTRA-FINE MINI PEN NEEDLE) 31 gauge x 3/16" Ndle Use with insulin twice a day. 400 each 3    pravastatin (PRAVACHOL) 40 MG tablet Take 1 tablet (40 mg total) by mouth once daily. 90 tablet 3    sevelamer carbonate (RENVELA) 800 mg Tab Take 800 mg by mouth 3 (three) times daily.      sodium bicarbonate 650 MG tablet Take 2 tablets (1,300 mg total) by mouth 2 (two) times daily. 360 tablet 3    valsartan (DIOVAN) 80 MG tablet Take 1 tablet (80 mg total) by mouth once daily. 90 tablet 3 " "    No current facility-administered medications for this visit.       Review of patient's allergies indicates:   Allergen Reactions    Iodine and iodide containing products Hives and Other (See Comments)     Not specified     Nifedipine      weakness       OB History          3    Para   3    Term   3            AB        Living             SAB        IAB        Ectopic        Multiple        Live Births                      Well Woman  No LMP recorded (lmp unknown). Patient has had a hysterectomy.   Pap:  Mammo:  Colonoscopy:  Dexa:    ROS  As per HPI.      Physical Exam  BP (!) 123/54 (BP Location: Right arm, Patient Position: Sitting, BP Method: Medium (Automatic))   Pulse 82   Ht 5' 3" (1.6 m)   Wt 58.8 kg (129 lb 10.1 oz)   LMP  (LMP Unknown)   BMI 22.96 kg/m²   General: alert and oriented, no acute distress  Respiratory: normal respiratory effort  Abd: soft, non-tender, non-distended  Pelvic:  Ext. Genitalia: normal external genitalia. Normal bartholin's and skeens glands  Vagina: + atrophy. Normal vaginal mucosa with lesions/abrasions from pessary. One dime sized abrasion along posterior vagina and one at cuff. No discharge noted.   Non-tender bladder base without palpable mass.  Cervix: absent  Uterus:  surgically absent, vaginal cuff well healed   Urethra: no masses or tenderness  Urethral meatus: no lesions, caruncle or prolapse.    Pessary removed, cleaned, and reinserted without difficulty    Impression  1. Postmenopause atrophic vaginitis  estradioL (ESTRING) 2 mg (7.5 mcg /24 hour) vaginal ring      2. Pessary maintenance  estradioL (ESTRING) 2 mg (7.5 mcg /24 hour) vaginal ring        We reviewed the above issues and discussed options for short-term versus long-term management of her problems.     Plan:   Incomplete bladder emptying due to prolapse  -- continue #4 ring with support and incontinence knob  -- patient is on dialysis  -- previous  with pessary in place   "   Vaginal atrophy (dryness):  --  Will try to see if Estring is approved (will replace every 3 months), if not will continue vaginal estrogen cream 2 nights per week instead     RTC in 3 months for pessary check or sooner for Estring insertion    9 minutes were spent in face to face time with this patient  80 % of this time was spent in counseling and/or coordination of care    Eric Rivera PA-C  Division of Female Pelvic Medicine and Reconstructive Surgery  Department of Obstetrics & Gynecology  [facility] [place], [place]

## 2024-01-10 ENCOUNTER — OFFICE VISIT (OUTPATIENT)
Dept: UROGYNECOLOGY | Facility: CLINIC | Age: 84
End: 2024-01-10
Payer: MEDICARE

## 2024-01-10 VITALS
DIASTOLIC BLOOD PRESSURE: 54 MMHG | HEIGHT: 63 IN | SYSTOLIC BLOOD PRESSURE: 123 MMHG | WEIGHT: 129.63 LBS | HEART RATE: 82 BPM | BODY MASS INDEX: 22.97 KG/M2

## 2024-01-10 DIAGNOSIS — Z46.89 PESSARY MAINTENANCE: ICD-10-CM

## 2024-01-10 DIAGNOSIS — N95.2 POSTMENOPAUSE ATROPHIC VAGINITIS: Primary | ICD-10-CM

## 2024-01-10 PROCEDURE — 99999 PR PBB SHADOW E&M-EST. PATIENT-LVL IV: CPT | Mod: PBBFAC,,, | Performed by: PHYSICIAN ASSISTANT

## 2024-01-10 PROCEDURE — 99213 OFFICE O/P EST LOW 20 MIN: CPT | Mod: S$GLB,,, | Performed by: PHYSICIAN ASSISTANT

## 2024-01-10 PROCEDURE — 1159F MED LIST DOCD IN RCRD: CPT | Mod: CPTII,S$GLB,, | Performed by: PHYSICIAN ASSISTANT

## 2024-01-10 PROCEDURE — 3074F SYST BP LT 130 MM HG: CPT | Mod: CPTII,S$GLB,, | Performed by: PHYSICIAN ASSISTANT

## 2024-01-10 PROCEDURE — 1126F AMNT PAIN NOTED NONE PRSNT: CPT | Mod: CPTII,S$GLB,, | Performed by: PHYSICIAN ASSISTANT

## 2024-01-10 PROCEDURE — 3078F DIAST BP <80 MM HG: CPT | Mod: CPTII,S$GLB,, | Performed by: PHYSICIAN ASSISTANT

## 2024-01-10 PROCEDURE — 3288F FALL RISK ASSESSMENT DOCD: CPT | Mod: CPTII,S$GLB,, | Performed by: PHYSICIAN ASSISTANT

## 2024-01-10 PROCEDURE — 1101F PT FALLS ASSESS-DOCD LE1/YR: CPT | Mod: CPTII,S$GLB,, | Performed by: PHYSICIAN ASSISTANT

## 2024-01-10 NOTE — PATIENT INSTRUCTIONS
Incomplete bladder emptying due to prolapse  -- continue #4 ring with support and incontinence knob  -- patient is on dialysis     Vaginal atrophy (dryness):  --  Will try to see if Estring is approved (will replace every 3 months), if not will continue vaginal estrogen cream 2 nights per week instead     RTC in 3 months for pessary check or sooner for Estring insertion

## 2024-01-12 ENCOUNTER — PATIENT MESSAGE (OUTPATIENT)
Dept: FAMILY MEDICINE | Facility: CLINIC | Age: 84
End: 2024-01-12
Payer: MEDICARE

## 2024-01-12 DIAGNOSIS — E11.42 DIABETIC POLYNEUROPATHY ASSOCIATED WITH TYPE 2 DIABETES MELLITUS: ICD-10-CM

## 2024-01-12 RX ORDER — PEN NEEDLE, DIABETIC 30 GX3/16"
NEEDLE, DISPOSABLE MISCELLANEOUS
Qty: 400 EACH | Refills: 3 | Status: SHIPPED | OUTPATIENT
Start: 2024-01-12

## 2024-01-29 ENCOUNTER — CLINICAL SUPPORT (OUTPATIENT)
Dept: CARDIOLOGY | Facility: HOSPITAL | Age: 84
End: 2024-01-29
Payer: MEDICARE

## 2024-01-29 ENCOUNTER — CLINICAL SUPPORT (OUTPATIENT)
Dept: CARDIOLOGY | Facility: HOSPITAL | Age: 84
End: 2024-01-29
Attending: INTERNAL MEDICINE
Payer: MEDICARE

## 2024-01-29 DIAGNOSIS — Z95.810 PRESENCE OF AUTOMATIC (IMPLANTABLE) CARDIAC DEFIBRILLATOR: ICD-10-CM

## 2024-01-29 PROCEDURE — 93295 DEV INTERROG REMOTE 1/2/MLT: CPT | Mod: ,,, | Performed by: INTERNAL MEDICINE

## 2024-01-29 PROCEDURE — 93296 REM INTERROG EVL PM/IDS: CPT | Performed by: INTERNAL MEDICINE

## 2024-02-09 DIAGNOSIS — I49.8 OTHER SPECIFIED CARDIAC ARRHYTHMIAS: Primary | ICD-10-CM

## 2024-02-23 LAB
OHS CV AF BURDEN PERCENT: < 1
OHS CV BIV PACING PERCENT: 99.7 %
OHS CV DC REMOTE DEVICE TYPE: NORMAL
OHS CV ICD SHOCK: NO
OHS CV RV PACING PERCENT: 99.7 %

## 2024-02-29 ENCOUNTER — HOSPITAL ENCOUNTER (INPATIENT)
Facility: HOSPITAL | Age: 84
LOS: 2 days | Discharge: HOME HEALTH | DRG: 640 | End: 2024-03-02
Attending: EMERGENCY MEDICINE | Admitting: HOSPITALIST
Payer: MEDICARE

## 2024-02-29 DIAGNOSIS — Z79.4 CONTROLLED TYPE 2 DIABETES MELLITUS WITH BOTH EYES AFFECTED BY MILD NONPROLIFERATIVE RETINOPATHY AND MACULAR EDEMA, WITH LONG-TERM CURRENT USE OF INSULIN: ICD-10-CM

## 2024-02-29 DIAGNOSIS — E11.3213 CONTROLLED TYPE 2 DIABETES MELLITUS WITH BOTH EYES AFFECTED BY MILD NONPROLIFERATIVE RETINOPATHY AND MACULAR EDEMA, WITH LONG-TERM CURRENT USE OF INSULIN: ICD-10-CM

## 2024-02-29 DIAGNOSIS — I50.9 CHF (CONGESTIVE HEART FAILURE): ICD-10-CM

## 2024-02-29 DIAGNOSIS — R41.82 AMS (ALTERED MENTAL STATUS): ICD-10-CM

## 2024-02-29 DIAGNOSIS — T68.XXXA HYPOTHERMIA: ICD-10-CM

## 2024-02-29 DIAGNOSIS — E86.0 DEHYDRATION: ICD-10-CM

## 2024-02-29 DIAGNOSIS — E83.51 HYPOCALCEMIA: Primary | ICD-10-CM

## 2024-02-29 DIAGNOSIS — R29.818 ACUTE FOCAL NEUROLOGICAL DEFICIT: ICD-10-CM

## 2024-02-29 PROBLEM — N18.6 ESRD ON PERITONEAL DIALYSIS: Status: ACTIVE | Noted: 2023-08-22

## 2024-02-29 PROBLEM — G93.41 ACUTE METABOLIC ENCEPHALOPATHY: Status: ACTIVE | Noted: 2024-02-29

## 2024-02-29 PROBLEM — E11.649 TYPE 2 DIABETES MELLITUS WITH HYPOGLYCEMIA, WITH LONG-TERM CURRENT USE OF INSULIN: Status: ACTIVE | Noted: 2024-02-29

## 2024-02-29 PROBLEM — G93.40 ACUTE ENCEPHALOPATHY: Status: ACTIVE | Noted: 2024-02-29

## 2024-02-29 LAB
ALBUMIN SERPL BCP-MCNC: 2.6 G/DL (ref 3.5–5.2)
ALP SERPL-CCNC: 160 U/L (ref 55–135)
ALT SERPL W/O P-5'-P-CCNC: 26 U/L (ref 10–44)
ANION GAP SERPL CALC-SCNC: 16 MMOL/L (ref 8–16)
APPEARANCE FLD: CLEAR
AST SERPL-CCNC: 21 U/L (ref 10–40)
BACTERIA #/AREA URNS HPF: NORMAL /HPF
BASOPHILS # BLD AUTO: 0.03 K/UL (ref 0–0.2)
BASOPHILS NFR BLD: 0.4 % (ref 0–1.9)
BASOPHILS NFR FLD MANUAL: 1 %
BILIRUB SERPL-MCNC: 0.3 MG/DL (ref 0.1–1)
BILIRUB UR QL STRIP: NEGATIVE
BODY FLD TYPE: NORMAL
BUN SERPL-MCNC: 104 MG/DL (ref 8–23)
CA-I BLDV-SCNC: 0.91 MMOL/L (ref 1.06–1.42)
CALCIUM SERPL-MCNC: 6.6 MG/DL (ref 8.7–10.5)
CHLORIDE SERPL-SCNC: 101 MMOL/L (ref 95–110)
CHOLEST SERPL-MCNC: 52 MG/DL (ref 120–199)
CHOLEST/HDLC SERPL: 1.6 {RATIO} (ref 2–5)
CLARITY UR: CLEAR
CO2 SERPL-SCNC: 12 MMOL/L (ref 23–29)
COLOR FLD: COLORLESS
COLOR UR: YELLOW
CREAT SERPL-MCNC: 5.1 MG/DL (ref 0.5–1.4)
CREAT SERPL-MCNC: 5.5 MG/DL (ref 0.5–1.4)
CTP QC/QA: YES
CTP QC/QA: YES
DIFFERENTIAL METHOD BLD: ABNORMAL
EOSINOPHIL # BLD AUTO: 0.1 K/UL (ref 0–0.5)
EOSINOPHIL NFR BLD: 1.2 % (ref 0–8)
EOSINOPHIL NFR FLD MANUAL: 1 %
ERYTHROCYTE [DISTWIDTH] IN BLOOD BY AUTOMATED COUNT: 13.6 % (ref 11.5–14.5)
EST. GFR  (NO RACE VARIABLE): 8 ML/MIN/1.73 M^2
ESTIMATED AVG GLUCOSE: 140 MG/DL (ref 68–131)
GLUCOSE SERPL-MCNC: 195 MG/DL (ref 70–110)
GLUCOSE UR QL STRIP: NEGATIVE
GRAM STN SPEC: NORMAL
GRAM STN SPEC: NORMAL
HBA1C MFR BLD: 6.5 % (ref 4–5.6)
HBV SURFACE AG SERPL QL IA: NORMAL
HCT VFR BLD AUTO: 25.4 % (ref 37–48.5)
HDLC SERPL-MCNC: 32 MG/DL (ref 40–75)
HDLC SERPL: 61.5 % (ref 20–50)
HGB BLD-MCNC: 8.5 G/DL (ref 12–16)
HGB UR QL STRIP: NEGATIVE
HYALINE CASTS #/AREA URNS LPF: 0 /LPF
IMM GRANULOCYTES # BLD AUTO: 0.07 K/UL (ref 0–0.04)
IMM GRANULOCYTES NFR BLD AUTO: 0.8 % (ref 0–0.5)
INR PPP: 1.2 (ref 0.8–1.2)
KETONES UR QL STRIP: NEGATIVE
LACTATE SERPL-SCNC: <0.2 MMOL/L (ref 0.5–2.2)
LDLC SERPL CALC-MCNC: 10 MG/DL (ref 63–159)
LEUKOCYTE ESTERASE UR QL STRIP: ABNORMAL
LYMPHOCYTES # BLD AUTO: 0.9 K/UL (ref 1–4.8)
LYMPHOCYTES NFR BLD: 10.2 % (ref 18–48)
LYMPHOCYTES NFR FLD MANUAL: 44 %
MAGNESIUM SERPL-MCNC: 1.8 MG/DL (ref 1.6–2.6)
MCH RBC QN AUTO: 31.1 PG (ref 27–31)
MCHC RBC AUTO-ENTMCNC: 33.5 G/DL (ref 32–36)
MCV RBC AUTO: 93 FL (ref 82–98)
MICROSCOPIC COMMENT: NORMAL
MONOCYTES # BLD AUTO: 0.4 K/UL (ref 0.3–1)
MONOCYTES NFR BLD: 4.9 % (ref 4–15)
MONOS+MACROS NFR FLD MANUAL: 36 %
NEUTROPHILS # BLD AUTO: 7 K/UL (ref 1.8–7.7)
NEUTROPHILS NFR BLD: 82.5 % (ref 38–73)
NEUTROPHILS NFR FLD MANUAL: 18 %
NITRITE UR QL STRIP: NEGATIVE
NONHDLC SERPL-MCNC: 20 MG/DL
NRBC BLD-RTO: 0 /100 WBC
OHS QRS DURATION: 140 MS
OHS QRS DURATION: 146 MS
OHS QTC CALCULATION: 533 MS
OHS QTC CALCULATION: 582 MS
PH UR STRIP: 5 [PH] (ref 5–8)
PLATELET # BLD AUTO: 196 K/UL (ref 150–450)
PMV BLD AUTO: 10.5 FL (ref 9.2–12.9)
POC MOLECULAR INFLUENZA A AGN: NEGATIVE
POC MOLECULAR INFLUENZA B AGN: NEGATIVE
POC PTINR: 1.9 (ref 0.9–1.2)
POCT GLUCOSE: 210 MG/DL (ref 70–110)
POCT GLUCOSE: 334 MG/DL (ref 70–110)
POTASSIUM SERPL-SCNC: 3.6 MMOL/L (ref 3.5–5.1)
PROT SERPL-MCNC: 6.3 G/DL (ref 6–8.4)
PROT UR QL STRIP: ABNORMAL
PROTHROMBIN TIME: 12.8 SEC (ref 9–12.5)
RBC # BLD AUTO: 2.73 M/UL (ref 4–5.4)
RBC #/AREA URNS HPF: 4 /HPF (ref 0–4)
SAMPLE: ABNORMAL
SAMPLE: ABNORMAL
SARS-COV-2 RDRP RESP QL NAA+PROBE: NEGATIVE
SODIUM SERPL-SCNC: 129 MMOL/L (ref 136–145)
SP GR UR STRIP: 1.01 (ref 1–1.03)
SQUAMOUS #/AREA URNS HPF: 2 /HPF
TRIGL SERPL-MCNC: 50 MG/DL (ref 30–150)
TSH SERPL DL<=0.005 MIU/L-ACNC: 0.76 UIU/ML (ref 0.4–4)
URN SPEC COLLECT METH UR: ABNORMAL
UROBILINOGEN UR STRIP-ACNC: NEGATIVE EU/DL
WBC # BLD AUTO: 8.45 K/UL (ref 3.9–12.7)
WBC # FLD: 59 /CU MM
WBC #/AREA URNS HPF: 4 /HPF (ref 0–5)

## 2024-02-29 PROCEDURE — 25000003 PHARM REV CODE 250: Mod: JZ,JG

## 2024-02-29 PROCEDURE — 87804 INFLUENZA ASSAY W/OPTIC: CPT | Mod: 59

## 2024-02-29 PROCEDURE — 25500020 PHARM REV CODE 255: Performed by: EMERGENCY MEDICINE

## 2024-02-29 PROCEDURE — 83605 ASSAY OF LACTIC ACID: CPT | Performed by: HOSPITALIST

## 2024-02-29 PROCEDURE — 86706 HEP B SURFACE ANTIBODY: CPT | Performed by: STUDENT IN AN ORGANIZED HEALTH CARE EDUCATION/TRAINING PROGRAM

## 2024-02-29 PROCEDURE — 87040 BLOOD CULTURE FOR BACTERIA: CPT | Mod: 59 | Performed by: EMERGENCY MEDICINE

## 2024-02-29 PROCEDURE — 83690 ASSAY OF LIPASE: CPT | Performed by: STUDENT IN AN ORGANIZED HEALTH CARE EDUCATION/TRAINING PROGRAM

## 2024-02-29 PROCEDURE — 25000003 PHARM REV CODE 250: Performed by: HOSPITALIST

## 2024-02-29 PROCEDURE — 82565 ASSAY OF CREATININE: CPT

## 2024-02-29 PROCEDURE — 80061 LIPID PANEL: CPT | Performed by: EMERGENCY MEDICINE

## 2024-02-29 PROCEDURE — 3E1M39Z IRRIGATION OF PERITONEAL CAVITY USING DIALYSATE, PERCUTANEOUS APPROACH: ICD-10-PCS | Performed by: HOSPITALIST

## 2024-02-29 PROCEDURE — 93005 ELECTROCARDIOGRAM TRACING: CPT

## 2024-02-29 PROCEDURE — 85610 PROTHROMBIN TIME: CPT | Performed by: EMERGENCY MEDICINE

## 2024-02-29 PROCEDURE — 11000001 HC ACUTE MED/SURG PRIVATE ROOM

## 2024-02-29 PROCEDURE — 90945 DIALYSIS ONE EVALUATION: CPT

## 2024-02-29 PROCEDURE — 82150 ASSAY OF AMYLASE: CPT | Performed by: STUDENT IN AN ORGANIZED HEALTH CARE EDUCATION/TRAINING PROGRAM

## 2024-02-29 PROCEDURE — 83605 ASSAY OF LACTIC ACID: CPT | Mod: 91 | Performed by: EMERGENCY MEDICINE

## 2024-02-29 PROCEDURE — 84443 ASSAY THYROID STIM HORMONE: CPT | Performed by: EMERGENCY MEDICINE

## 2024-02-29 PROCEDURE — 96375 TX/PRO/DX INJ NEW DRUG ADDON: CPT

## 2024-02-29 PROCEDURE — 99285 EMERGENCY DEPT VISIT HI MDM: CPT | Mod: 25

## 2024-02-29 PROCEDURE — 87635 SARS-COV-2 COVID-19 AMP PRB: CPT | Performed by: EMERGENCY MEDICINE

## 2024-02-29 PROCEDURE — 87205 SMEAR GRAM STAIN: CPT | Performed by: STUDENT IN AN ORGANIZED HEALTH CARE EDUCATION/TRAINING PROGRAM

## 2024-02-29 PROCEDURE — 85610 PROTHROMBIN TIME: CPT

## 2024-02-29 PROCEDURE — 93010 ELECTROCARDIOGRAM REPORT: CPT | Mod: ,,, | Performed by: INTERNAL MEDICINE

## 2024-02-29 PROCEDURE — 89051 BODY FLUID CELL COUNT: CPT | Performed by: STUDENT IN AN ORGANIZED HEALTH CARE EDUCATION/TRAINING PROGRAM

## 2024-02-29 PROCEDURE — 63600175 PHARM REV CODE 636 W HCPCS: Performed by: HOSPITALIST

## 2024-02-29 PROCEDURE — 87340 HEPATITIS B SURFACE AG IA: CPT | Performed by: STUDENT IN AN ORGANIZED HEALTH CARE EDUCATION/TRAINING PROGRAM

## 2024-02-29 PROCEDURE — 83036 HEMOGLOBIN GLYCOSYLATED A1C: CPT | Performed by: EMERGENCY MEDICINE

## 2024-02-29 PROCEDURE — 63600175 PHARM REV CODE 636 W HCPCS

## 2024-02-29 PROCEDURE — 99900035 HC TECH TIME PER 15 MIN (STAT)

## 2024-02-29 PROCEDURE — 80053 COMPREHEN METABOLIC PANEL: CPT | Performed by: EMERGENCY MEDICINE

## 2024-02-29 PROCEDURE — 81000 URINALYSIS NONAUTO W/SCOPE: CPT

## 2024-02-29 PROCEDURE — 96374 THER/PROPH/DIAG INJ IV PUSH: CPT

## 2024-02-29 PROCEDURE — 83735 ASSAY OF MAGNESIUM: CPT | Performed by: EMERGENCY MEDICINE

## 2024-02-29 PROCEDURE — 63600175 PHARM REV CODE 636 W HCPCS: Performed by: EMERGENCY MEDICINE

## 2024-02-29 PROCEDURE — 82330 ASSAY OF CALCIUM: CPT | Performed by: HOSPITALIST

## 2024-02-29 PROCEDURE — 96372 THER/PROPH/DIAG INJ SC/IM: CPT | Performed by: EMERGENCY MEDICINE

## 2024-02-29 PROCEDURE — 25000003 PHARM REV CODE 250: Performed by: STUDENT IN AN ORGANIZED HEALTH CARE EDUCATION/TRAINING PROGRAM

## 2024-02-29 PROCEDURE — 25000003 PHARM REV CODE 250: Performed by: EMERGENCY MEDICINE

## 2024-02-29 PROCEDURE — 87102 FUNGUS ISOLATION CULTURE: CPT | Performed by: STUDENT IN AN ORGANIZED HEALTH CARE EDUCATION/TRAINING PROGRAM

## 2024-02-29 PROCEDURE — 87070 CULTURE OTHR SPECIMN AEROBIC: CPT | Performed by: STUDENT IN AN ORGANIZED HEALTH CARE EDUCATION/TRAINING PROGRAM

## 2024-02-29 PROCEDURE — 85025 COMPLETE CBC W/AUTO DIFF WBC: CPT | Performed by: EMERGENCY MEDICINE

## 2024-02-29 PROCEDURE — 82962 GLUCOSE BLOOD TEST: CPT

## 2024-02-29 PROCEDURE — G0426 INPT/ED TELECONSULT50: HCPCS | Mod: 95,,, | Performed by: PSYCHIATRY & NEUROLOGY

## 2024-02-29 RX ORDER — ONDANSETRON HYDROCHLORIDE 2 MG/ML
4 INJECTION, SOLUTION INTRAVENOUS EVERY 8 HOURS PRN
Status: DISCONTINUED | OUTPATIENT
Start: 2024-02-29 | End: 2024-03-02 | Stop reason: HOSPADM

## 2024-02-29 RX ORDER — HEPARIN SODIUM 5000 [USP'U]/ML
5000 INJECTION, SOLUTION INTRAVENOUS; SUBCUTANEOUS EVERY 12 HOURS
Status: DISCONTINUED | OUTPATIENT
Start: 2024-02-29 | End: 2024-03-02 | Stop reason: HOSPADM

## 2024-02-29 RX ORDER — METHYLPREDNISOLONE SOD SUCC 125 MG
125 VIAL (EA) INJECTION
Status: COMPLETED | OUTPATIENT
Start: 2024-02-29 | End: 2024-02-29

## 2024-02-29 RX ORDER — ONDANSETRON 8 MG/1
8 TABLET, ORALLY DISINTEGRATING ORAL EVERY 8 HOURS PRN
Status: DISCONTINUED | OUTPATIENT
Start: 2024-02-29 | End: 2024-03-02 | Stop reason: HOSPADM

## 2024-02-29 RX ORDER — IBUPROFEN 200 MG
16 TABLET ORAL
Status: DISCONTINUED | OUTPATIENT
Start: 2024-02-29 | End: 2024-03-02 | Stop reason: HOSPADM

## 2024-02-29 RX ORDER — NAPROXEN SODIUM 220 MG/1
81 TABLET, FILM COATED ORAL DAILY
Status: DISCONTINUED | OUTPATIENT
Start: 2024-03-01 | End: 2024-03-02 | Stop reason: HOSPADM

## 2024-02-29 RX ORDER — FAMOTIDINE 10 MG/ML
20 INJECTION INTRAVENOUS 2 TIMES DAILY
Status: DISCONTINUED | OUTPATIENT
Start: 2024-02-29 | End: 2024-02-29

## 2024-02-29 RX ORDER — CALCITRIOL 0.25 UG/1
0.5 CAPSULE ORAL DAILY
Status: DISCONTINUED | OUTPATIENT
Start: 2024-02-29 | End: 2024-03-02 | Stop reason: HOSPADM

## 2024-02-29 RX ORDER — CALCIUM GLUCONATE 20 MG/ML
1 INJECTION, SOLUTION INTRAVENOUS ONCE
Status: COMPLETED | OUTPATIENT
Start: 2024-02-29 | End: 2024-02-29

## 2024-02-29 RX ORDER — IBUPROFEN 200 MG
24 TABLET ORAL
Status: DISCONTINUED | OUTPATIENT
Start: 2024-02-29 | End: 2024-03-02 | Stop reason: HOSPADM

## 2024-02-29 RX ORDER — INSULIN ASPART 100 [IU]/ML
0-5 INJECTION, SOLUTION INTRAVENOUS; SUBCUTANEOUS
Status: DISCONTINUED | OUTPATIENT
Start: 2024-02-29 | End: 2024-03-02 | Stop reason: HOSPADM

## 2024-02-29 RX ORDER — ENOXAPARIN SODIUM 100 MG/ML
30 INJECTION SUBCUTANEOUS EVERY 24 HOURS
Status: DISCONTINUED | OUTPATIENT
Start: 2024-02-29 | End: 2024-02-29

## 2024-02-29 RX ORDER — FAMOTIDINE 10 MG/ML
20 INJECTION INTRAVENOUS ONCE
Status: COMPLETED | OUTPATIENT
Start: 2024-02-29 | End: 2024-02-29

## 2024-02-29 RX ORDER — VALSARTAN 160 MG/1
160 TABLET ORAL DAILY
Status: ON HOLD | COMMUNITY
Start: 2024-02-12 | End: 2024-03-11

## 2024-02-29 RX ORDER — PNV NO.95/FERROUS FUM/FOLIC AC 28MG-0.8MG
100 TABLET ORAL DAILY
Status: DISCONTINUED | OUTPATIENT
Start: 2024-02-29 | End: 2024-03-02 | Stop reason: HOSPADM

## 2024-02-29 RX ORDER — ACETAMINOPHEN 325 MG/1
650 TABLET ORAL EVERY 4 HOURS PRN
Status: DISCONTINUED | OUTPATIENT
Start: 2024-02-29 | End: 2024-03-02 | Stop reason: HOSPADM

## 2024-02-29 RX ORDER — DIPHENHYDRAMINE HYDROCHLORIDE 50 MG/ML
50 INJECTION INTRAMUSCULAR; INTRAVENOUS ONCE
Status: COMPLETED | OUTPATIENT
Start: 2024-02-29 | End: 2024-02-29

## 2024-02-29 RX ORDER — CHOLECALCIFEROL (VITAMIN D3) 25 MCG
1000 TABLET ORAL DAILY
Status: DISCONTINUED | OUTPATIENT
Start: 2024-02-29 | End: 2024-03-01

## 2024-02-29 RX ORDER — POLYETHYLENE GLYCOL 3350 17 G/17G
17 POWDER, FOR SOLUTION ORAL DAILY
Status: DISCONTINUED | OUTPATIENT
Start: 2024-02-29 | End: 2024-03-02 | Stop reason: HOSPADM

## 2024-02-29 RX ORDER — VALSARTAN 320 MG/1
320 TABLET ORAL
COMMUNITY
Start: 2023-12-20 | End: 2024-02-29 | Stop reason: DRUGHIGH

## 2024-02-29 RX ORDER — NALOXONE HCL 0.4 MG/ML
0.02 VIAL (ML) INJECTION
Status: DISCONTINUED | OUTPATIENT
Start: 2024-02-29 | End: 2024-03-02 | Stop reason: HOSPADM

## 2024-02-29 RX ORDER — ACETAMINOPHEN 500 MG
1000 TABLET ORAL EVERY 8 HOURS PRN
Status: DISCONTINUED | OUTPATIENT
Start: 2024-02-29 | End: 2024-03-02 | Stop reason: HOSPADM

## 2024-02-29 RX ORDER — GLUCAGON 1 MG
1 KIT INJECTION
Status: DISCONTINUED | OUTPATIENT
Start: 2024-02-29 | End: 2024-03-02 | Stop reason: HOSPADM

## 2024-02-29 RX ORDER — TALC
6 POWDER (GRAM) TOPICAL NIGHTLY PRN
Status: DISCONTINUED | OUTPATIENT
Start: 2024-02-29 | End: 2024-03-02 | Stop reason: HOSPADM

## 2024-02-29 RX ORDER — SODIUM CHLORIDE 0.9 % (FLUSH) 0.9 %
10 SYRINGE (ML) INJECTION EVERY 8 HOURS
Status: DISCONTINUED | OUTPATIENT
Start: 2024-02-29 | End: 2024-03-02 | Stop reason: HOSPADM

## 2024-02-29 RX ORDER — FLUTICASONE PROPIONATE 50 MCG
1 SPRAY, SUSPENSION (ML) NASAL DAILY PRN
COMMUNITY
Start: 2023-11-18 | End: 2024-04-20

## 2024-02-29 RX ORDER — SODIUM BICARBONATE 650 MG/1
650 TABLET ORAL 3 TIMES DAILY
Status: DISCONTINUED | OUTPATIENT
Start: 2024-02-29 | End: 2024-03-02 | Stop reason: HOSPADM

## 2024-02-29 RX ORDER — FAMOTIDINE 10 MG/ML
20 INJECTION INTRAVENOUS
Status: DISCONTINUED | OUTPATIENT
Start: 2024-02-29 | End: 2024-02-29 | Stop reason: SDUPTHER

## 2024-02-29 RX ORDER — SEMAGLUTIDE 0.68 MG/ML
0.25 INJECTION, SOLUTION SUBCUTANEOUS
Status: ON HOLD | COMMUNITY
Start: 2024-02-28 | End: 2024-03-11 | Stop reason: HOSPADM

## 2024-02-29 RX ADMIN — ACETAMINOPHEN 1000 MG: 500 TABLET ORAL at 08:02

## 2024-02-29 RX ADMIN — Medication 100 MCG: at 12:02

## 2024-02-29 RX ADMIN — POLYETHYLENE GLYCOL 3350 17 G: 17 POWDER, FOR SOLUTION ORAL at 12:02

## 2024-02-29 RX ADMIN — CALCIUM GLUCONATE 1 G: 20 INJECTION, SOLUTION INTRAVENOUS at 03:02

## 2024-02-29 RX ADMIN — IOHEXOL 100 ML: 350 INJECTION, SOLUTION INTRAVENOUS at 09:02

## 2024-02-29 RX ADMIN — CHOLECALCIFEROL TAB 25 MCG (1000 UNIT) 1000 UNITS: 25 TAB at 12:02

## 2024-02-29 RX ADMIN — CALCIUM GLUCONATE 1 G: 20 INJECTION, SOLUTION INTRAVENOUS at 10:02

## 2024-02-29 RX ADMIN — SODIUM BICARBONATE 650 MG TABLET 650 MG: at 03:02

## 2024-02-29 RX ADMIN — CEFTRIAXONE SODIUM 2 G: 2 INJECTION, POWDER, FOR SOLUTION INTRAMUSCULAR; INTRAVENOUS at 11:02

## 2024-02-29 RX ADMIN — INSULIN DETEMIR 5 UNITS: 100 INJECTION, SOLUTION SUBCUTANEOUS at 08:02

## 2024-02-29 RX ADMIN — FAMOTIDINE 20 MG: 10 INJECTION, SOLUTION INTRAVENOUS at 09:02

## 2024-02-29 RX ADMIN — SODIUM BICARBONATE 650 MG TABLET 650 MG: at 08:02

## 2024-02-29 RX ADMIN — HEPARIN SODIUM 5000 UNITS: 5000 INJECTION INTRAVENOUS; SUBCUTANEOUS at 08:02

## 2024-02-29 RX ADMIN — CALCITRIOL CAPSULES 0.25 MCG 0.5 MCG: 0.25 CAPSULE ORAL at 03:02

## 2024-02-29 RX ADMIN — INSULIN ASPART 2 UNITS: 100 INJECTION, SOLUTION INTRAVENOUS; SUBCUTANEOUS at 08:02

## 2024-02-29 RX ADMIN — METHYLPREDNISOLONE SODIUM SUCCINATE 125 MG: 125 INJECTION, POWDER, FOR SOLUTION INTRAMUSCULAR; INTRAVENOUS at 09:02

## 2024-02-29 RX ADMIN — DIPHENHYDRAMINE HYDROCHLORIDE 50 MG: 50 INJECTION INTRAMUSCULAR; INTRAVENOUS at 09:02

## 2024-02-29 NOTE — PROGRESS NOTES
02/29/24 1700   Vitals   BP (!) 159/70   Temp 97.4 °F (36.3 °C)   Temp Source Temporal   Pulse 80   Resp 16   Peritoneal Dialysis   Exchange Type Cycler   Peritoneal Treatment Status Started   Dianeal Solution Dextrose 1.5% in 6000 mL;Dextrose 1.5% in 2000 mL   Cycler Peritoneal Dialysis   Effluent Appearance Clear   Number of Cycles 3   Fill Volume (mL) 2000 mL   Total Volume (mL) 8000 mL   Dwell Time (specify hr/min) 2.31   Cycler Treatment Comments Aseptic technique used to connect Pt to PD Cycler

## 2024-02-29 NOTE — ASSESSMENT & PLAN NOTE
-concern for possible infectious etiology  -Initiated on infectious workup in the ED; chest x-ray with edema, UA pending, blood cultures collected; flu and COVID negative   - no focalizing symptoms  -Price Arredondo   -initiate on IV ceftriaxone for possible peritonitis; discussed with Nephrology and will check fluid

## 2024-02-29 NOTE — TELEMEDICINE CONSULT
[facility]  Vascular Neurology  Comprehensive Stroke Center  TeleVascular Neurology Acute Consultation Note        Consult Information  Consult to Telemedicine - Acute Stroke  Consult performed by: Zach Barajas MD  Consult ordered by: Salinas Tyson MD          Consulting Provider: SALINAS TYSON   Current Providers  No providers found    Patient Location:  [facility] EMERGENCY DEPARTMENT Emergency Department    Spoke hospital nurse at bedside with patient assisting consultant.  Patient information was obtained from primary team.       Stroke Documentation  Acute Stroke Times   Last Known Normal Date: 02/28/24  Last Known Normal Time: 2045  Stroke Team Called Time: 0917  Stroke Team Arrival Time: 0922  CT Interpretation Time: 0922  Thrombolytic Therapy Recommended: No  Thrombectomy Recommended: No    NIH Scale:  1a. Level of Consciousness: 2-->Not alert, requires repeated stimulation to attend, or is obtunded and requires strong or painful stimulation to make movements (not stereotyped)  1b. LOC Questions: 2-->Answers neither question correctly  1c. LOC Commands: 2-->Performs neither task correctly  2. Best Gaze: 0-->Normal  3. Visual: 0-->No visual loss  4. Facial Palsy: 0-->Normal symmetrical movements  5a. Motor Arm, Left: 0-->No drift, limb holds 90 (or 45) degrees for full 10 secs  5b. Motor Arm, Right: 0-->No drift, limb holds 90 (or 45) degrees for full 10 secs  6a. Motor Leg, Left: 3-->No effort against gravity, leg falls to bed immediately  6b. Motor Leg, Right: 3-->No effort against gravity, leg falls to bed immediately  7. Limb Ataxia: 0-->Absent  8. Sensory: 1-->Mild-to-moderate sensory loss, patient feels pinprick is less sharp or is dull on the affected side, or there is a loss of superficial pain with pinprick, but patient is aware of being touched  9. Best Language: 2-->Severe aphasia, all communication is through fragmentary expression, great need for  inference, questioning, and guessing by the listener. Range of information that can be exchanged is limited, listener carries burden of. . . (see row details)  10. Dysarthria: 2-->Severe dysarthria, patients speech is so slurred as to be unintelligible in the absence of or out of proportion to any dysphasia, or is mute/anarthric  11. Extinction and Inattention (formerly Neglect): 0-->No abnormality  Total (NIH Stroke Scale): 17      Modified [place]:    Quentin Coma Scale:     ABCD2 Score:    [id number] Score:    HAS -BLED Score:    ICH Score:    Hunt & Ayala Classification:      Blood pressure (!) 117/50, pulse 85, resp. rate 18, SpO2 100 %.    Van Negative    Medical Decision Making  HPI:  83 y.o. female w/ LSN last night, ESRD on PD, found in bed this AM, alert but not responding verbally, upon arrival she moves all extremities     Imaging personally interpreted:  CT Brain: no evidence of early or subacute ischemic changes, intracerebral hemorrhage or hyperdense vessel signs  CTA Head & Neck: cervico-cephalic vasculature negative for any clear contributing large or medium vessel occlusion related to the patient's current presentation and no targets identified for acute or urgent reperfusion therapy       Assessment and plan:  Minimally responsive state - now made worse by benadryl give to premedicate for the angiogram. She is out of window for lytics. Basilar artery appears patent, at this point suspect an alternative etiology for her nonfocal significant reduction in alertness.    Lytics recommendation: Thrombolytic therapy not recommended due to Outside of treatment window   Thrombectomy recommendation: No; No large vessel occlusion identified on imaging   Placement recommendation: pending further studies     Past Medical History:   Diagnosis Date    Acute hypoxemic respiratory failure 12/19/2019    Acute right-sided thoracic back pain 12/09/2019    Allergy     Asthma     Basal cell carcinoma     left forehead     Basal cell carcinoma     left nose    Basal cell carcinoma 05/20/2015    right nose    Basal cell carcinoma 12/22/2015    left lower post neck    Basal cell carcinoma 12/03/2019    left ant scalp     BCC (basal cell carcinoma of skin) 10/20/2022    Right alar groove    Bilateral pleural effusion     Bilateral renal cysts     Breast cancer     CAD (coronary artery disease)     Cardiomyopathy     Cardiomyopathy, ischemic     Cataract     CHF (congestive heart failure)     CKD (chronic kidney disease) stage 4, GFR 15-29 ml/min     Colon polyp 2011    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, with long-term current use of insulin 02/22/2018    COPD (chronic obstructive pulmonary disease)     COPD exacerbation 04/08/2018    Current mild episode of major depressive disorder without prior episode 01/25/2022    Defibrillator discharge     Dependence on renal dialysis 08/22/2023    Diabetes mellitus     Diabetes mellitus type II     Diabetes with neurologic complications     Edema     ESRD needing dialysis     Goiter     MNG    Hematuria, unspecified     HX: breast cancer     Hyperlipidemia     Hypertension     Hypocalcemia     Hypokalemia     Hyponatremia     Hyponatremia 04/02/2022    Iron deficiency anemia 05/16/2017    Iron deficiency anemia     Iron deficiency anemia     Left kidney mass     Meningioma     Microalbuminuria due to type 2 diabetes mellitus 01/26/2022    Osteoporosis, postmenopausal     Pneumonia 12/08/2019    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    Proteinuria 01/21/2019    Pseudomonas pneumonia     SCC (squamous cell carcinoma) 08/25/2022    right posterior neck    Skin cancer     s/p excision    Sleep apnea     CPAP    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/2015    mid forehead    Unspecified vitamin D deficiency     UTI (urinary tract infection) 04/02/2022    Ventricular tachycardia     Vitamin B12 deficiency     Vitamin D  "deficiency disease      Current Outpatient Medications   Medication Instructions    acetaminophen (TYLENOL) 500 mg, Oral, As needed (PRN)    albuterol (PROVENTIL/VENTOLIN HFA) 90 mcg/actuation inhaler 2 puffs, Inhalation, Every 6 hours PRN, Rescue    albuterol-ipratropium (DUO-NEB) 2.5 mg-0.5 mg/3 mL nebulizer solution 3 mLs, Nebulization, Every 4 hours PRN    aspirin 81 mg, Oral, Daily    blood sugar diagnostic (ACCU-CHEK HECTOR) Strp Uses Accu-Check Hector meter to test BG 4x/day    carvediloL (COREG) 25 MG tablet TAKE 2 TABLETS (50 MG TOTAL) BY MOUTH 2 (TWO) TIMES DAILY.    cholecalciferol, vitamin D3, (VITAMIN D3) 50 mcg (2,000 unit) Tab 1 tablet, Oral, Daily    cyanocobalamin (VITAMIN B-12) 100 mcg, Oral, Daily    epoetin [place]-epbx (RETACRIT) 10,000 Units, Subcutaneous, Every 30 days    ergocalciferol (ERGOCALCIFEROL) 50,000 Units, Oral, Every 7 days    estradioL (ESTRACE) 0.01 % (0.1 mg/gram) vaginal cream PLACE 1 GRAM VAGINALLY EVERY OTHER DAY    estradioL (ESTRING) 2 mg, Vaginal, Every 3 months    furosemide (LASIX) 40 mg, Oral, Daily    hydrALAZINE (APRESOLINE) 100 mg, Oral, 2 times daily    insulin (LANTUS SOLOSTAR U-100 INSULIN) 10 Units, Subcutaneous, Nightly    lancets (ACCU-CHEK SOFTCLIX LANCETS) Misc Uses Accu-Chek Hector meter to test BG 2x/day    lisinopriL (PRINIVIL,ZESTRIL) 5 mg, Oral, Daily    montelukast (SINGULAIR) 10 mg, Oral, Daily    nitroGLYCERIN (NITROSTAT) 0.4 mg, Sublingual, Every 5 min PRN    omega-3 fatty acids 500 mg Cap 1 capsule, Oral, Daily    pen needle, diabetic (BD ULTRA-FINE MINI PEN NEEDLE) 31 gauge x 3/16" Ndle Use with insulin twice a day.    pravastatin (PRAVACHOL) 40 mg, Oral, Daily    sevelamer carbonate (RENVELA) 800 mg, Oral, 3 times daily    sodium bicarbonate 1,300 mg, Oral, 2 times daily    valsartan (DIOVAN) 80 mg, Oral, Daily           ROS  Physical Exam  Past Medical History:   Diagnosis Date    Acute encephalopathy 2/29/2024    Acute hypoxemic respiratory failure " 12/19/2019    Acute right-sided thoracic back pain 12/09/2019    Allergy     Asthma     Basal cell carcinoma     left forehead    Basal cell carcinoma     left nose    Basal cell carcinoma 05/20/2015    right nose    Basal cell carcinoma 12/22/2015    left lower post neck    Basal cell carcinoma 12/03/2019    left ant scalp     BCC (basal cell carcinoma of skin) 10/20/2022    Right alar groove    Bilateral pleural effusion     Bilateral renal cysts     Breast cancer     CAD (coronary artery disease)     Cardiomyopathy     Cardiomyopathy, ischemic     Cataract     CHF (congestive heart failure)     CKD (chronic kidney disease) stage 4, GFR 15-29 ml/min     Colon polyp 2011    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, with long-term current use of insulin 02/22/2018    COPD (chronic obstructive pulmonary disease)     COPD exacerbation 04/08/2018    Current mild episode of major depressive disorder without prior episode 01/25/2022    Defibrillator discharge     Dependence on renal dialysis 08/22/2023    Diabetes mellitus     Diabetes mellitus type II     Diabetes with neurologic complications     Edema     ESRD needing dialysis     Goiter     MNG    Hematuria, unspecified     HX: breast cancer     Hyperlipidemia     Hypertension     Hypocalcemia     Hypokalemia     Hyponatremia     Hyponatremia 04/02/2022    Iron deficiency anemia 05/16/2017    Iron deficiency anemia     Iron deficiency anemia     Left kidney mass     Meningioma     Microalbuminuria due to type 2 diabetes mellitus 01/26/2022    Osteoporosis, postmenopausal     Pneumonia 12/08/2019    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    Proteinuria 01/21/2019    Pseudomonas pneumonia     SCC (squamous cell carcinoma) 08/25/2022    right posterior neck    Skin cancer     s/p excision    Sleep apnea     CPAP    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/2015    mid forehead    Unspecified vitamin D  deficiency     UTI (urinary tract infection) 04/02/2022    Ventricular tachycardia     Vitamin B12 deficiency     Vitamin D deficiency disease      Past Surgical History:   Procedure Laterality Date    BASAL CELL CARCINOMA EXCISION      posterior neck and nose    BREAST BIOPSY      BREAST CYST EXCISION Left     BREAST SURGERY      CARDIAC DEFIBRILLATOR PLACEMENT      x 2    CATARACT EXTRACTION W/  INTRAOCULAR LENS IMPLANT Bilateral     CHOLECYSTECTOMY      COLONOSCOPY N/A 11/5/2019    Procedure: COLONOSCOPY;  Surgeon: Boaz Botello MD;  Location: [address]);  Service: Endoscopy;  Laterality: N/A;  [facility] - [employer] -     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    FRACTURE SURGERY      left elbow and wrist as a child    HYSTERECTOMY      INSERTION, CATHETER, DIALYSIS, PERITONEAL, LAPAROSCOPIC N/A 4/25/2023    Procedure: INSERTION, CATHETER, DIALYSIS, PERITONEAL, LAPAROSCOPIC;  Surgeon: Marcelo Isaac MD;  Location: [facility] OR;  Service: General;  Laterality: N/A;    LAPAROSCOPIC LYSIS OF ADHESIONS N/A 4/25/2023    Procedure: LYSIS, ADHESIONS, LAPAROSCOPIC;  Surgeon: Marcelo Isaac MD;  Location: [facility] OR;  Service: General;  Laterality: N/A;    MASTECTOMY Right     OMENTOPEXY, LAPAROSCOPIC N/A 4/25/2023    Procedure: OMENTOPEXY, LAPAROSCOPIC;  Surgeon: Marcelo Isaac MD;  Location: [facility] OR;  Service: General;  Laterality: N/A;    REPLACEMENT OF IMPLANTABLE CARDIOVERTER-DEFIBRILLATOR (ICD) GENERATOR N/A 12/17/2018    Procedure: REPLACEMENT, ICD GENERATOR;  Surgeon: Jan Mckeon MD;  Location: [facility] EP LAB;  Service: Cardiology;  Laterality: N/A;  DONNA, CRT-D gen change, MDT, MAC, SK, 3 Prep    REVISION OF SKIN POCKET FOR CARDIOVERTER-DEFIBRILLATOR  12/17/2018    Procedure: Revision, Skin Pocket, For Cardioverter-Defibrillator;  Surgeon: Jan Mckeon MD;  Location: [facility] EP LAB;  Service: Cardiology;;    SQUAMOUS CELL CARCINOMA EXCISION      remved from forehead    TONSILLECTOMY       Family  History   Problem Relation Age of Onset    Asthma Mother     Hypertension Mother     Stroke Mother     Diabetes Father     Cardiomyopathy Father     Diabetes Sister     Heart disease Sister     Heart attack Sister     Heart attack Brother     Diabetes Brother     Heart disease Brother     Hypertension Brother     Diabetes Brother     Heart disease Brother     Hypertension Brother     Cerebral aneurysm Brother     Diabetes Brother     Heart disease Brother     Cancer Brother         colon    Diabetes Brother     Diabetes Daughter         prediabetes    Cancer Daughter         melanoma    Obesity Daughter     Melanoma Daughter     Cancer Son         skin    Diabetes Son         prediabetes    Diabetes Son     No Known Problems Maternal Grandmother     No Known Problems Maternal Grandfather     No Known Problems Paternal Grandmother     No Known Problems Paternal Grandfather     Amblyopia Neg Hx     Blindness Neg Hx     Cataracts Neg Hx     Glaucoma Neg Hx     Macular degeneration Neg Hx     Retinal detachment Neg Hx     Strabismus Neg Hx     Thyroid disease Neg Hx        Diagnoses  Problem Noted   Acute Encephalopathy 2/29/2024       Zach Barajas MD      Emergent/Acute neurological consultation requested by spoke provider due to critical concerns for possible cerebrovascular event that could result in permanent loss of neurologic/bodily function, severe disability or death of this patient.  Immediate/timely evaluation by a highly prepared expert is paramount for optimal outcomes  High risk for neurological deterioration if not properly diagnosed  High risk for neurological deterioration if not treated promplty/as soon as possible  Complex diagnostic evaluation may be required (advanced imaging)  High risk treatment options (thrombolytics and/or thrombectomy)    Patient care was coordinated with spoke provider, including but not limted to    Discussing likely diagnosis/etiology of symptoms  Making recommendations for  further diagnostic studies  Making recommendations for intravenous thrombolytics or other advanced therapies  Making recommendations for disposition (admission/transfer for higher level of care)

## 2024-02-29 NOTE — ASSESSMENT & PLAN NOTE
-hypoglycemic this a.m.; suspect this may be secondary to a missed dialysis session  - have decreased home long-acting insulin dose and consulted Nephrology for dialysis  - improved now after administration of dextrose

## 2024-02-29 NOTE — ASSESSMENT & PLAN NOTE
Patient's anemia is currently controlled. Has not received any PRBCs to date. Etiology likely d/t chronic disease due to Chronic Kidney Disease/ESRD  Current CBC reviewed-   Lab Results   Component Value Date    HGB 8.5 (L) 02/29/2024    HCT 25.4 (L) 02/29/2024     Monitor serial CBC and transfuse if patient becomes hemodynamically unstable, symptomatic or H/H drops below 7/21.

## 2024-02-29 NOTE — ASSESSMENT & PLAN NOTE
-suspect this is secondary to infection versus uremia versus medication side effect  -TSH within normal limits; infectious workup as above   -appears to be improving with correction of hypothermia   -initiated on antibiotics  -Initially stroke activated in the ED; CT and CTA not consistent with stroke, unable to undergo MRI due to ICD placement but overall picture is much more consistent with metabolic process rather than CVA

## 2024-02-29 NOTE — ED NOTES
Returned to room from CT. Lab and RT at BS.  LKW 2100 last night.   Pt minimally responsive on arrival, now worse since benadryl in CT. Pt nonverbal but appears to have intermittent purposeful movements to all extremities. Pt is not able to follow commands at this time.     Patient on cardiac monitor, automatic blood pressure cuff and pulse oximeter.     Van negative.     NIH Scale:  1a. Level of Consciousness: 2-->Not alert, requires repeated stimulation to attend, or is obtunded and requires strong or painful stimulation to make movements (not stereotyped)  1b. LOC Questions: 2-->Answers neither question correctly  1c. LOC Commands: 2-->Performs neither task correctly  2. Best Gaze: 0-->Normal  3. Visual: 0-->No visual loss  4. Facial Palsy: 0-->Normal symmetrical movements  5a. Motor Arm, Left: 0-->No drift, limb holds 90 (or 45) degrees for full 10 secs  5b. Motor Arm, Right: 0-->No drift, limb holds 90 (or 45) degrees for full 10 secs  6a. Motor Leg, Left: 3-->No effort against gravity, leg falls to bed immediately  6b. Motor Leg, Right: 3-->No effort against gravity, leg falls to bed immediately  7. Limb Ataxia: 0-->Absent  8. Sensory: 1-->Mild-to-moderate sensory loss, patient feels pinprick is less sharp or is dull on the affected side, or there is a loss of superficial pain with pinprick, but patient is aware of being touched  9. Best Language: 2-->Severe aphasia, all communication is through fragmentary expression, great need for inference, questioning, and guessing by the listener. Range of information that can be exchanged is limited, listener carries burden of. . . (see row details)  10. Dysarthria: 2-->Severe dysarthria, patients speech is so slurred as to be unintelligible in the absence of or out of proportion to any dysphasia, or is mute/anarthric  11. Extinction and Inattention (formerly Neglect): 0-->No abnormality  Total (NIH Stroke Scale): 17

## 2024-02-29 NOTE — ASSESSMENT & PLAN NOTE
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 Will replace calcium and monitor electrolytes closely. The latest calcium labs have been reviewed and are listed below.  Recent Labs   Lab 02/29/24  0946   CALCIUM 6.6*       Recent Labs   Lab 02/29/24  1339   CAION 0.91*

## 2024-02-29 NOTE — HPI
Ms. Quinones is an 83-year-old woman with ESRD on PD, type 2 diabetes, and hypertension who presents with encephalopathy.  History obtained mostly via daughter at bedside.  She reports that over the past 2 days prior to admission her mother had reported some  generalized fatigue and discomfort but no focalizing symptoms. Reportedly was started on his intake and took 1st dose yesterday.  Daughter states that they normally speak to her on the phone in the morning but did not hear from her at 7:00 a.m. as usual.  Around 8:00 a.m. she had her  drive by to check on her and at that point she was found down prompting call to EMS.  Initially CABG was 65 and she was given glucose without response.

## 2024-02-29 NOTE — SUBJECTIVE & OBJECTIVE
HPI:  83 y.o. female w/ LSN last night, ESRD on PD, found in bed this AM, alert but not responding verbally, upon arrival she moves all extremities     Imaging personally interpreted:  CT Brain: no evidence of early or subacute ischemic changes, intracerebral hemorrhage or hyperdense vessel signs  CTA Head & Neck: cervico-cephalic vasculature negative for any clear contributing large or medium vessel occlusion related to the patient's current presentation and no targets identified for acute or urgent reperfusion therapy       Assessment and plan:  Minimally responsive state - now made worse by benadryl give to premedicate for the angiogram. She is out of window for lytics. Basilar artery appears patent, at this point suspect an alternative etiology for her nonfocal significant reduction in alertness.    Lytics recommendation: Thrombolytic therapy not recommended due to Outside of treatment window   Thrombectomy recommendation: No; No large vessel occlusion identified on imaging   Placement recommendation: pending further studies     Past Medical History:   Diagnosis Date    Acute hypoxemic respiratory failure 12/19/2019    Acute right-sided thoracic back pain 12/09/2019    Allergy     Asthma     Basal cell carcinoma     left forehead    Basal cell carcinoma     left nose    Basal cell carcinoma 05/20/2015    right nose    Basal cell carcinoma 12/22/2015    left lower post neck    Basal cell carcinoma 12/03/2019    left ant scalp     BCC (basal cell carcinoma of skin) 10/20/2022    Right alar groove    Bilateral pleural effusion     Bilateral renal cysts     Breast cancer     CAD (coronary artery disease)     Cardiomyopathy     Cardiomyopathy, ischemic     Cataract     CHF (congestive heart failure)     CKD (chronic kidney disease) stage 4, GFR 15-29 ml/min     Colon polyp 2011    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, with long-term current  use of insulin 02/22/2018    COPD (chronic obstructive pulmonary disease)     COPD exacerbation 04/08/2018    Current mild episode of major depressive disorder without prior episode 01/25/2022    Defibrillator discharge     Dependence on renal dialysis 08/22/2023    Diabetes mellitus     Diabetes mellitus type II     Diabetes with neurologic complications     Edema     ESRD needing dialysis     Goiter     MNG    Hematuria, unspecified     HX: breast cancer     Hyperlipidemia     Hypertension     Hypocalcemia     Hypokalemia     Hyponatremia     Hyponatremia 04/02/2022    Iron deficiency anemia 05/16/2017    Iron deficiency anemia     Iron deficiency anemia     Left kidney mass     Meningioma     Microalbuminuria due to type 2 diabetes mellitus 01/26/2022    Osteoporosis, postmenopausal     Pneumonia 12/08/2019    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    Proteinuria 01/21/2019    Pseudomonas pneumonia     SCC (squamous cell carcinoma) 08/25/2022    right posterior neck    Skin cancer     s/p excision    Sleep apnea     CPAP    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/2015    mid forehead    Unspecified vitamin D deficiency     UTI (urinary tract infection) 04/02/2022    Ventricular tachycardia     Vitamin B12 deficiency     Vitamin D deficiency disease      Current Outpatient Medications   Medication Instructions    acetaminophen (TYLENOL) 500 mg, Oral, As needed (PRN)    albuterol (PROVENTIL/VENTOLIN HFA) 90 mcg/actuation inhaler 2 puffs, Inhalation, Every 6 hours PRN, Rescue    albuterol-ipratropium (DUO-NEB) 2.5 mg-0.5 mg/3 mL nebulizer solution 3 mLs, Nebulization, Every 4 hours PRN    aspirin 81 mg, Oral, Daily    blood sugar diagnostic (ACCU-CHEK HECTOR) Strp Uses Accu-Check Hector meter to test BG 4x/day    carvediloL (COREG) 25 MG tablet TAKE 2 TABLETS (50 MG TOTAL) BY MOUTH 2 (TWO) TIMES DAILY.    cholecalciferol, vitamin D3, (VITAMIN D3) 50 mcg (2,000 unit) Tab 1 tablet, Oral, Daily    cyanocobalamin (VITAMIN  "B-12) 100 mcg, Oral, Daily    epoetin [place]-epbx (RETACRIT) 10,000 Units, Subcutaneous, Every 30 days    ergocalciferol (ERGOCALCIFEROL) 50,000 Units, Oral, Every 7 days    estradioL (ESTRACE) 0.01 % (0.1 mg/gram) vaginal cream PLACE 1 GRAM VAGINALLY EVERY OTHER DAY    estradioL (ESTRING) 2 mg, Vaginal, Every 3 months    furosemide (LASIX) 40 mg, Oral, Daily    hydrALAZINE (APRESOLINE) 100 mg, Oral, 2 times daily    insulin (LANTUS SOLOSTAR U-100 INSULIN) 10 Units, Subcutaneous, Nightly    lancets (ACCU-CHEK SOFTCLIX LANCETS) Misc Uses Accu-Chek [place] meter to test BG 2x/day    lisinopriL (PRINIVIL,ZESTRIL) 5 mg, Oral, Daily    montelukast (SINGULAIR) 10 mg, Oral, Daily    nitroGLYCERIN (NITROSTAT) 0.4 mg, Sublingual, Every 5 min PRN    omega-3 fatty acids 500 mg Cap 1 capsule, Oral, Daily    pen needle, diabetic (BD ULTRA-FINE MINI PEN NEEDLE) 31 gauge x 3/16" Ndle Use with insulin twice a day.    pravastatin (PRAVACHOL) 40 mg, Oral, Daily    sevelamer carbonate (RENVELA) 800 mg, Oral, 3 times daily    sodium bicarbonate 1,300 mg, Oral, 2 times daily    valsartan (DIOVAN) 80 mg, Oral, Daily         "

## 2024-02-29 NOTE — ASSESSMENT & PLAN NOTE
Chronic, controlled. Latest blood pressure and vitals reviewed-     Temp:  [89.3 °F (31.8 °C)-94.7 °F (34.8 °C)]   Pulse:  [71-85]   Resp:  [14-25]   BP: (117-168)/(50-68)   SpO2:  [95 %-100 %] .   Home meds for hypertension were reviewed and noted below.   Hypertension Medications               carvediloL (COREG) 25 MG tablet TAKE 2 TABLETS (50 MG TOTAL) BY MOUTH 2 (TWO) TIMES DAILY.    furosemide (LASIX) 40 MG tablet Take 1 tablet (40 mg total) by mouth once daily.    hydrALAZINE (APRESOLINE) 100 MG tablet Take 1 tablet (100 mg total) by mouth 2 (two) times daily.    lisinopriL (PRINIVIL,ZESTRIL) 5 MG tablet Take 5 mg by mouth once daily.    nitroGLYCERIN (NITROSTAT) 0.4 MG SL tablet Place 0.4 mg under the tongue every 5 (five) minutes as needed for Chest pain.     valsartan (DIOVAN) 160 MG tablet Take 160 mg by mouth once daily.            While in the hospital, will manage blood pressure as follows; Adjust home antihypertensive regimen as follows- holding home antihypertensives for now as patient is normotensive and confused; will resume piecemeal once patient is able to safely swallow    Will utilize p.r.n. blood pressure medication only if patient's blood pressure greater than 180/110 and she develops symptoms such as worsening chest pain or shortness of breath.

## 2024-02-29 NOTE — ASSESSMENT & PLAN NOTE
-with volume overload   -nephrology consulted  -will need to sample peritoneal fluid and performed PD

## 2024-02-29 NOTE — CONSULTS
Nephrology Consult  H&P      Consult Requested By: Danish Reyes,*  Reason for Consult: ESRD on PD      SUBJECTIVE:     History of Present Illness:  Myesha Quinones is a 83 y.o.   female who  has a past medical history of Acute encephalopathy (2/29/2024), Acute hypoxemic respiratory failure (12/19/2019), Acute right-sided thoracic back pain (12/09/2019), Allergy, Asthma, Basal cell carcinoma, Basal cell carcinoma, Basal cell carcinoma (05/20/2015), Basal cell carcinoma (12/22/2015), Basal cell carcinoma (12/03/2019), BCC (basal cell carcinoma of skin) (10/20/2022), Bilateral pleural effusion, Bilateral renal cysts, Breast cancer, CAD (coronary artery disease), Cardiomyopathy, Cardiomyopathy, ischemic, Cataract, CHF (congestive heart failure), CKD (chronic kidney disease) stage 4, GFR 15-29 ml/min, Colon polyp (2011), 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, with long-term current use of insulin (02/22/2018), COPD (chronic obstructive pulmonary disease), COPD exacerbation (04/08/2018), Current mild episode of major depressive disorder without prior episode (01/25/2022), Defibrillator discharge, Dependence on renal dialysis (08/22/2023), Diabetes mellitus, Diabetes mellitus type II, Diabetes with neurologic complications, Edema, ESRD needing dialysis, Goiter, Hematuria, unspecified, breast cancer, Hyperlipidemia, Hypertension, Hypocalcemia, Hypokalemia, Hyponatremia, Hyponatremia (04/02/2022), Iron deficiency anemia (05/16/2017), Iron deficiency anemia, Iron deficiency anemia, Left kidney mass, Meningioma, Microalbuminuria due to type 2 diabetes mellitus (01/26/2022), Osteoporosis, postmenopausal, Pneumonia (12/08/2019), 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 The patient presented to the [facility] on 2/29/2024 with a primary complaint of AMS per family. She was not feeling wel starting Monday. She is PD patient follows with Dr Borrero, PD Monday and Thursday 1 exchange over 4hr.   Was AMS till this AM now AAOx3 Temp still low.    ?    Review of Systems   Constitutional:  Positive for malaise/fatigue. Negative for chills and fever.   HENT:  Negative for congestion and sore throat.    Eyes:  Negative for blurred vision, double vision and photophobia.   Respiratory:  Negative for cough and shortness of breath.    Cardiovascular:  Negative for chest pain, palpitations and leg swelling.   Gastrointestinal:  Negative for abdominal pain, diarrhea, nausea and vomiting.   Genitourinary:  Negative for dysuria and urgency.   Musculoskeletal:  Negative for joint pain and myalgias.   Skin:  Negative for itching and rash.   Neurological:  Positive for weakness. Negative for dizziness, sensory change and headaches.   Endo/Heme/Allergies:  Negative for polydipsia. Does not bruise/bleed easily.   Psychiatric/Behavioral:  Negative for depression.        Past Medical History:   Diagnosis Date    Acute encephalopathy 2/29/2024    Acute hypoxemic respiratory failure 12/19/2019    Acute right-sided thoracic back pain 12/09/2019    Allergy     Asthma     Basal cell carcinoma     left forehead    Basal cell carcinoma     left nose    Basal cell carcinoma 05/20/2015    right nose    Basal cell carcinoma 12/22/2015    left lower post neck    Basal cell carcinoma 12/03/2019    left ant scalp     BCC (basal cell carcinoma of skin) 10/20/2022    Right alar groove    Bilateral pleural effusion     Bilateral renal cysts     Breast cancer     CAD (coronary artery disease)     Cardiomyopathy     Cardiomyopathy, ischemic     Cataract      CHF (congestive heart failure)     CKD (chronic kidney disease) stage 4, GFR 15-29 ml/min     Colon polyp 2011    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, with long-term current use of insulin 02/22/2018    COPD (chronic obstructive pulmonary disease)     COPD exacerbation 04/08/2018    Current mild episode of major depressive disorder without prior episode 01/25/2022    Defibrillator discharge     Dependence on renal dialysis 08/22/2023    Diabetes mellitus     Diabetes mellitus type II     Diabetes with neurologic complications     Edema     ESRD needing dialysis     Goiter     MNG    Hematuria, unspecified     HX: breast cancer     Hyperlipidemia     Hypertension     Hypocalcemia     Hypokalemia     Hyponatremia     Hyponatremia 04/02/2022    Iron deficiency anemia 05/16/2017    Iron deficiency anemia     Iron deficiency anemia     Left kidney mass     Meningioma     Microalbuminuria due to type 2 diabetes mellitus 01/26/2022    Osteoporosis, postmenopausal     Pneumonia 12/08/2019    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    Proteinuria 01/21/2019    Pseudomonas pneumonia     SCC (squamous cell carcinoma) 08/25/2022    right posterior neck    Skin cancer     s/p excision    Sleep apnea     CPAP    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/2015    mid forehead    Unspecified vitamin D deficiency     UTI (urinary tract infection) 04/02/2022    Ventricular tachycardia     Vitamin B12 deficiency     Vitamin D deficiency disease      Past Surgical History:   Procedure Laterality Date    BASAL CELL CARCINOMA EXCISION      posterior neck and nose    BREAST BIOPSY      BREAST CYST EXCISION Left     BREAST SURGERY      CARDIAC DEFIBRILLATOR PLACEMENT      x 2    CATARACT EXTRACTION W/  INTRAOCULAR LENS IMPLANT Bilateral     CHOLECYSTECTOMY      COLONOSCOPY N/A 11/5/2019    Procedure: COLONOSCOPY;  Surgeon: Boaz Botello MD;  Location: [address]  [facility]);  Service: Endoscopy;  Laterality: N/A;  AICD - [employer]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    FRACTURE SURGERY      left elbow and wrist as a child    HYSTERECTOMY      INSERTION, CATHETER, DIALYSIS, PERITONEAL, LAPAROSCOPIC N/A 4/25/2023    Procedure: INSERTION, CATHETER, DIALYSIS, PERITONEAL, LAPAROSCOPIC;  Surgeon: Marcelo Isaac MD;  Location: [facility] OR;  Service: General;  Laterality: N/A;    LAPAROSCOPIC LYSIS OF ADHESIONS N/A 4/25/2023    Procedure: LYSIS, ADHESIONS, LAPAROSCOPIC;  Surgeon: Marcelo Isaac MD;  Location: [facility] OR;  Service: General;  Laterality: N/A;    MASTECTOMY Right     OMENTOPEXY, LAPAROSCOPIC N/A 4/25/2023    Procedure: OMENTOPEXY, LAPAROSCOPIC;  Surgeon: Marcelo Isaac MD;  Location: [facility] OR;  Service: General;  Laterality: N/A;    REPLACEMENT OF IMPLANTABLE CARDIOVERTER-DEFIBRILLATOR (ICD) GENERATOR N/A 12/17/2018    Procedure: REPLACEMENT, ICD GENERATOR;  Surgeon: Jan Mckeon MD;  Location: [facility] EP LAB;  Service: Cardiology;  Laterality: N/A;  DONNA, CRT-D gen sulema, MDT, MAC, SK, 3 Prep    REVISION OF SKIN POCKET FOR CARDIOVERTER-DEFIBRILLATOR  12/17/2018    Procedure: Revision, Skin Pocket, For Cardioverter-Defibrillator;  Surgeon: Jan Mckeon MD;  Location: [facility] EP LAB;  Service: Cardiology;;    SQUAMOUS CELL CARCINOMA EXCISION      remved from forehead    TONSILLECTOMY       Family History   Problem Relation Age of Onset    Asthma Mother     Hypertension Mother     Stroke Mother     Diabetes Father     Cardiomyopathy Father     Diabetes Sister     Heart disease Sister     Heart attack Sister     Heart attack Brother     Diabetes Brother     Heart disease Brother     Hypertension Brother     Diabetes Brother     Heart disease Brother     Hypertension Brother     Cerebral aneurysm Brother     Diabetes Brother     Heart disease Brother     Cancer Brother         colon    Diabetes Brother     Diabetes Daughter         prediabetes    Cancer  Daughter         melanoma    Obesity Daughter     Melanoma Daughter     Cancer Son         skin    Diabetes Son         prediabetes    Diabetes Son     No Known Problems Maternal Grandmother     No Known Problems Maternal Grandfather     No Known Problems Paternal Grandmother     No Known Problems Paternal Grandfather     Amblyopia Neg Hx     Blindness Neg Hx     Cataracts Neg Hx     Glaucoma Neg Hx     Macular degeneration Neg Hx     Retinal detachment Neg Hx     Strabismus Neg Hx     Thyroid disease Neg Hx      Social History     Tobacco Use    Smoking status: Never     Passive exposure: Never    Smokeless tobacco: Never   Substance Use Topics    Alcohol use: No     Alcohol/week: 0.0 standard drinks of alcohol    Drug use: No       Review of patient's allergies indicates:   Allergen Reactions    Iodine and iodide containing products Hives and Other (See Comments)     Not specified     Nifedipine      weakness            OBJECTIVE:     Vital Signs (Most Recent)  Vitals:    02/29/24 1229 02/29/24 1330 02/29/24 1340 02/29/24 1345   BP: (!) 168/68 132/61 132/61    BP Location:       Patient Position:       Pulse: 76 78 82 79   Resp: 14 18 20 18   Temp:   (!) 92.3 °F (33.5 °C)    TempSrc:   Rectal    SpO2: 95% 98% 98% 98%                 Medications:   [START ON 3/1/2024] aspirin  81 mg Oral Daily    cefTRIAXone (Rocephin) IV (PEDS and ADULTS)  2 g Intravenous Q24H    cyanocobalamin  100 mcg Oral Daily    enoxparin  30 mg Subcutaneous Daily    insulin detemir U-100  5 Units Subcutaneous QHS    polyethylene glycol  17 g Oral Daily    sodium chloride 0.9%  10 mL Intravenous Q8H    vitamin D  1,000 Units Oral Daily           Physical Exam  Vitals and nursing note reviewed.   Constitutional:       General: She is not in acute distress.     Appearance: She is not diaphoretic.      Comments: Frail    HENT:      Head: Normocephalic and atraumatic.      Mouth/Throat:      Pharynx: No oropharyngeal exudate.   Eyes:       General: No scleral icterus.     Conjunctiva/sclera: Conjunctivae normal.      Pupils: Pupils are equal, round, and reactive to light.   Cardiovascular:      Rate and Rhythm: Normal rate and regular rhythm.      Heart sounds: Normal heart sounds. No murmur heard.  Pulmonary:      Effort: Pulmonary effort is normal. No respiratory distress.      Breath sounds: Normal breath sounds.   Abdominal:      General: Bowel sounds are normal. There is no distension.      Palpations: Abdomen is soft.      Tenderness: There is no abdominal tenderness.      Comments: PD catheter site no drainage no TTP    Musculoskeletal:         General: Normal range of motion.      Cervical back: Normal range of motion and neck supple.   Skin:     General: Skin is warm and dry.      Findings: No erythema.   Neurological:      Mental Status: She is alert and oriented to person, place, and time.      Cranial Nerves: No cranial nerve deficit.      Motor: Weakness present.   Psychiatric:         Mood and Affect: Affect normal.         Cognition and Memory: Memory normal.         Judgment: Judgment normal.         Laboratory:  Recent Labs   Lab 02/29/24  0946   WBC 8.45   HGB 8.5*   HCT 25.4*      MONO 4.9  0.4   EOSINOPHIL 1.2     Recent Labs   Lab 02/29/24  0946   *   K 3.6      CO2 12*   *   CREATININE 5.1*   CALCIUM 6.6*       Diagnostic Results:  X-Ray: Reviewed  US: Reviewed  Echo: Reviewed  ASSESSMENT/PLAN:     1. 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   Was AMS till this AM now AAOx3 Temp still low less likely Uremia   -- Will do PD fluid culture and Cell count, PD tonight with 3 Cycles over 10 hr BUN on high side.   -- Daily Renal Function Panel  -- Avoid Hypotension.  -- Renally dose all meds  -- Please avoid nephrotoxins, including NSAIDs, aminoglycosides, IV contrast (unless absolutely necessary), gadolinium, fleets and other phosphorous-based laxatives. Caution with  antibiotics.    Hypothermia AMS - better per Neuro not Stroke   -- Check for infection   -- Already on IV abx no Abd pain - pending PD fluid culture cell count   -- Less likely Uremia   -- No Liver disease     2. HTN   - controlled   3. Anemia of chronic kidney disease treated with MIGUEL   EPogen      Recent Labs   Lab 02/29/24  0946   HGB 8.5*   HCT 25.4*          Iron   Lab Results   Component Value Date    IRON 124 05/09/2023    TIBC 355 05/09/2023    FERRITIN 639 (H) 05/19/2023       4. MBD (E88.9 M90.80) - Hypocalcemia   -- Check PTH vit D   -- Replace Ca+ IV for now   Recent Labs   Lab 02/29/24 0946   CALCIUM 6.6*     Recent Labs   Lab 02/29/24 0946   MG 1.8       Lab Results   Component Value Date    .1 (H) 05/19/2023    CALCIUM 6.6 (LL) 02/29/2024    PHOS 4.4 05/19/2023     Lab Results   Component Value Date    DVSQLPFY29ND 32 09/16/2022     Acidosis   -- PO Bicarbonate 650 TID     Lab Results   Component Value Date    CO2 12 (L) 02/29/2024       5. Nutrition/Hypoalbuminemia    Recent Labs   Lab 02/29/24 0946   LABPROT 12.8*   ALBUMIN 2.6*     Nepro with meals TID.       Thank you for allowing me to participate in care of your patient  With any question please call   Anna Fairbanks MD     [facility]  ESTRELLA Morocho MD,   MD TAMIKA Isabel MD E. V. Harmon, NP  [address]   [place], [address] [phone]  After hours answering service: [phone]

## 2024-02-29 NOTE — PHARMACY MED REC
"Admission Medication History     The home medication history was taken by Fiona Colorado CPhT.    Medication history obtained from, Patient's Son Verified    You may go to "Admission" then "Reconcile Home Medications" tabs to review and/or act upon these items.     The home medication list has been updated by the Pharmacy department.   Please read ALL comments highlighted in yellow.   Please address this information as you see fit.    Feel free to contact us if you have any questions or require assistance.      The medications listed below were removed from the home medication list.  Please reorder if appropriate:  Patient reports no longer taking the following medication(s):  Sodium Bicarbonate 650 mg        Fiona Colorado CPhT.  Ext [phone]               .          "

## 2024-02-29 NOTE — PROGRESS NOTES
02/29/24 1405   Vitals   Pulse 81   Resp 20   SpO2 97 %   Peritoneal Dialysis   Exchange Type Manual   Peritoneal Treatment Status Completed   Dianeal Solution Dextrose 1.5% in 2000 mL   Cycler Peritoneal Dialysis   Number of Cycles 1   Fill Volume (mL) 1000 mL   Total Volume (mL) 1000 mL   Dwell Time (specify hr/min) 2   Cycler Treatment Comments 1L manual fill completed. Dwell x 2 hours, per MD order.     Manual fill tolerated well. Dressing change to PD catheter exit site performed. No s/s of infection noted. Catheter clamped,capped,and secured with adhesive cloth tape. Patient and family @ BS agreeable with plan of care.

## 2024-02-29 NOTE — PROGRESS NOTES
VN cued into room to complete admit assessment. VIP model introduced; VN working alongside bedside treatment team.  Plan of care reviewed with patient. Patient informed of fall risk, fall precautions, call light within reach, side rails x2 elevated. Patient notified to ask staff for assistance. Patient verbalized complete understanding. Time allowed for questions. Will continue to monitor and intervene as needed.     Patient is being prepared for PD at bedside. Family in room and someone will be staying with patient overnight.             02/29/24 1709   Admission   Initial VN Admission Questions Complete   Communication Issues? Patient Hearing   Shift   Virtual Nurse - Patient Verbalized Approval Of Camera Use   Safety/Activity   Patient Rounds bed in low position;call light in patient/parent reach;clutter free environment maintained;visualized patient   Safety Promotion/Fall Prevention family to remain at bedside;side rails raised x 2   Positioning   Body Position supine   Head of Bed (HOB) Positioning HOB elevated

## 2024-02-29 NOTE — ED PROVIDER NOTES
"Encounter Date: 2/29/2024       History     Chief Complaint   Patient presents with    Altered Mental Status     Last seen normal at bedtime last night ~10pm. Family members activated EMS this morning when they found patient to have AMS. EMS reports patient verbally unresponsive at scene - given Narcan 2 mg IVP with some improvement. On arrival to ED, awake but verbally unresponsive. Follows commands but not moving left side.     82 y/o F witha PMHx of COPD, ESRD on PD (Monday and Thursday), CAD, CHD, T2DM, HLD, HTN presents to the ED via EMS for AMS. Patient was found in bed alert but unresponsive this mornign around 8 AM. Patient was last seen normal around 8:45 PM yesterday evening. EMS found patient in bed where CBG was initially 65 and so D10 was given and repeat CBG in the 300s did not improve her mental status. EMS also noted possible pupil constrction so 2 mg IV narcan was given with no improvement. History is limited from patient due to acuity of patient's condition.    Patient's daughter is also at bedside who provided supplemental history. Patient apparently has been feeling "off" since Sunday (4 days ago) and actually had a nephrology appointment yesterday where she was prescribed ozempic and she took 1 dose yesterday. Patient normally lives by herself and performs her own PD.     The history is provided by the patient, medical records and a relative. The history is limited by the condition of the patient.     Review of patient's allergies indicates:   Allergen Reactions    Iodine and iodide containing products Hives and Other (See Comments)     Not specified     Nifedipine      weakness     Past Medical History:   Diagnosis Date    Acute encephalopathy 2/29/2024    Acute hypoxemic respiratory failure 12/19/2019    Acute right-sided thoracic back pain 12/09/2019    Allergy     Asthma     Basal cell carcinoma     left forehead    Basal cell carcinoma     left nose    Basal cell carcinoma 05/20/2015    " right nose    Basal cell carcinoma 12/22/2015    left lower post neck    Basal cell carcinoma 12/03/2019    left ant scalp     BCC (basal cell carcinoma of skin) 10/20/2022    Right alar groove    Bilateral pleural effusion     Bilateral renal cysts     Breast cancer     CAD (coronary artery disease)     Cardiomyopathy     Cardiomyopathy, ischemic     Cataract     CHF (congestive heart failure)     CKD (chronic kidney disease) stage 4, GFR 15-29 ml/min     Colon polyp 2011    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, with long-term current use of insulin 02/22/2018    COPD (chronic obstructive pulmonary disease)     COPD exacerbation 04/08/2018    Current mild episode of major depressive disorder without prior episode 01/25/2022    Defibrillator discharge     Dependence on renal dialysis 08/22/2023    Diabetes mellitus     Diabetes mellitus type II     Diabetes with neurologic complications     Edema     ESRD needing dialysis     Goiter     MNG    Hematuria, unspecified     HX: breast cancer     Hyperlipidemia     Hypertension     Hypocalcemia     Hypokalemia     Hyponatremia     Hyponatremia 04/02/2022    Iron deficiency anemia 05/16/2017    Iron deficiency anemia     Iron deficiency anemia     Left kidney mass     Meningioma     Microalbuminuria due to type 2 diabetes mellitus 01/26/2022    Osteoporosis, postmenopausal     Pneumonia 12/08/2019    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    Proteinuria 01/21/2019    Pseudomonas pneumonia     SCC (squamous cell carcinoma) 08/25/2022    right posterior neck    Skin cancer     s/p excision    Sleep apnea     CPAP    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/2015    mid forehead    Unspecified vitamin D deficiency     UTI (urinary tract infection) 04/02/2022    Ventricular tachycardia     Vitamin B12 deficiency     Vitamin D deficiency disease      Past Surgical History:   Procedure Laterality Date     BASAL CELL CARCINOMA EXCISION      posterior neck and nose    BREAST BIOPSY      BREAST CYST EXCISION Left     BREAST SURGERY      CARDIAC DEFIBRILLATOR PLACEMENT      x 2    CATARACT EXTRACTION W/  INTRAOCULAR LENS IMPLANT Bilateral     CHOLECYSTECTOMY      COLONOSCOPY N/A 11/5/2019    Procedure: COLONOSCOPY;  Surgeon: Boaz Botello MD;  Location: [facility] ENDO ([address] [place] [address]);  Service: Endoscopy;  Laterality: N/A;  AICD - [employer]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    FRACTURE SURGERY      left elbow and wrist as a child    HYSTERECTOMY      INSERTION, CATHETER, DIALYSIS, PERITONEAL, LAPAROSCOPIC N/A 4/25/2023    Procedure: INSERTION, CATHETER, DIALYSIS, PERITONEAL, LAPAROSCOPIC;  Surgeon: Marcelo Isaac MD;  Location: [facility] OR;  Service: General;  Laterality: N/A;    LAPAROSCOPIC LYSIS OF ADHESIONS N/A 4/25/2023    Procedure: LYSIS, ADHESIONS, LAPAROSCOPIC;  Surgeon: Marcelo Isaac MD;  Location: [facility] OR;  Service: General;  Laterality: N/A;    MASTECTOMY Right     OMENTOPEXY, LAPAROSCOPIC N/A 4/25/2023    Procedure: OMENTOPEXY, LAPAROSCOPIC;  Surgeon: Marcelo Isaac MD;  Location: [facility] OR;  Service: General;  Laterality: N/A;    REPLACEMENT OF IMPLANTABLE CARDIOVERTER-DEFIBRILLATOR (ICD) GENERATOR N/A 12/17/2018    Procedure: REPLACEMENT, ICD GENERATOR;  Surgeon: Jan Mckeon MD;  Location: [facility] EP LAB;  Service: Cardiology;  Laterality: N/A;  DONNA, CRT-D gen change, MDT, MAC, SK, 3 Prep    REVISION OF SKIN POCKET FOR CARDIOVERTER-DEFIBRILLATOR  12/17/2018    Procedure: Revision, Skin Pocket, For Cardioverter-Defibrillator;  Surgeon: Jan Mckeon MD;  Location: [facility] EP LAB;  Service: Cardiology;;    SQUAMOUS CELL CARCINOMA EXCISION      remved from forehead    TONSILLECTOMY       Family History   Problem Relation Age of Onset    Asthma Mother     Hypertension Mother     Stroke Mother     Diabetes Father     Cardiomyopathy Father     Diabetes Sister     Heart disease Sister     Heart  attack Sister     Heart attack Brother     Diabetes Brother     Heart disease Brother     Hypertension Brother     Diabetes Brother     Heart disease Brother     Hypertension Brother     Cerebral aneurysm Brother     Diabetes Brother     Heart disease Brother     Cancer Brother         colon    Diabetes Brother     Diabetes Daughter         prediabetes    Cancer Daughter         melanoma    Obesity Daughter     Melanoma Daughter     Cancer Son         skin    Diabetes Son         prediabetes    Diabetes Son     No Known Problems Maternal Grandmother     No Known Problems Maternal Grandfather     No Known Problems Paternal Grandmother     No Known Problems Paternal Grandfather     Amblyopia Neg Hx     Blindness Neg Hx     Cataracts Neg Hx     Glaucoma Neg Hx     Macular degeneration Neg Hx     Retinal detachment Neg Hx     Strabismus Neg Hx     Thyroid disease Neg Hx      Social History     Tobacco Use    Smoking status: Never     Passive exposure: Never    Smokeless tobacco: Never   Substance Use Topics    Alcohol use: No     Alcohol/week: 0.0 standard drinks of alcohol    Drug use: No     Review of Systems    Physical Exam     Initial Vitals   BP Pulse Resp Temp SpO2   02/29/24 0905 02/29/24 0905 02/29/24 0905 02/29/24 1020 02/29/24 0905   (!) 117/50 85 18 (!) 89.3 °F (31.8 °C) 100 %      MAP       --                Physical Exam    Nursing note and vitals reviewed.  Constitutional: Vital signs are normal. She appears well-developed and well-nourished. She is not diaphoretic. She appears distressed.   HENT:   Head: Normocephalic and atraumatic.   Right Ear: External ear normal.   Left Ear: External ear normal.   Eyes: EOM are normal. Right eye exhibits no discharge. Left eye exhibits no discharge.   Neck: Trachea normal. Neck supple. No thyroid mass present.   Normal range of motion.  Cardiovascular:  Normal rate, regular rhythm, normal heart sounds and intact distal pulses.     Exam reveals no gallop and no  friction rub.       No murmur heard.  Pulmonary/Chest: Breath sounds normal. No respiratory distress. She has no wheezes. She has no rhonchi. She has no rales.   Abdominal: Abdomen is soft. Bowel sounds are normal. She exhibits no distension. There is no abdominal tenderness. There is no rebound and no guarding.   Musculoskeletal:         General: No edema.      Cervical back: Normal range of motion and neck supple.     Neurological: She is alert. No cranial nerve deficit or sensory deficit. GCS score is 15. GCS eye subscore is 4. GCS verbal subscore is 1. GCS motor subscore is 6.   Able to move all 4 extremities spontaneously. Able to weakly smile -- no obvious facial asymmetry. Unable to assess pronator drift due to weakness against gravity. No verbal response to orientation questions.    Skin: Skin is warm and dry. Capillary refill takes less than 2 seconds. No rash noted.   Psychiatric: She has a normal mood and affect.         ED Course   Procedures  Labs Reviewed   CBC W/ AUTO DIFFERENTIAL - Abnormal; Notable for the following components:       Result Value    RBC 2.73 (*)     Hemoglobin 8.5 (*)     Hematocrit 25.4 (*)     MCH 31.1 (*)     Immature Granulocytes 0.8 (*)     Immature Grans (Abs) 0.07 (*)     Lymph # 0.9 (*)     Gran % 82.5 (*)     Lymph % 10.2 (*)     All other components within normal limits   COMPREHENSIVE METABOLIC PANEL - Abnormal; Notable for the following components:    Sodium 129 (*)     CO2 12 (*)     Glucose 195 (*)      (*)     Creatinine 5.1 (*)     Calcium 6.6 (*)     Albumin 2.6 (*)     Alkaline Phosphatase 160 (*)     eGFR 8 (*)     All other components within normal limits    Narrative:       CA critical result(s) called and verbal readback obtained from   ADAM Munoz by TRISTEN 02/29/2024 10:20   PROTIME-INR - Abnormal; Notable for the following components:    Prothrombin Time 12.8 (*)     All other components within normal limits   LIPID PANEL - Abnormal; Notable for the  following components:    Cholesterol 52 (*)     HDL 32 (*)     LDL Cholesterol 10.0 (*)     HDL/Cholesterol Ratio 61.5 (*)     Total Cholesterol/HDL Ratio 1.6 (*)     All other components within normal limits   POCT GLUCOSE - Abnormal; Notable for the following components:    POCT Glucose 210 (*)     All other components within normal limits   ISTAT PROCEDURE - Abnormal; Notable for the following components:    POC PTINR 1.9 (*)     All other components within normal limits   ISTAT CREATININE - Abnormal; Notable for the following components:    POC Creatinine 5.5 (*)     All other components within normal limits   CULTURE, BLOOD   CULTURE, BLOOD   TSH   MAGNESIUM   URINALYSIS, REFLEX TO URINE CULTURE   LACTIC ACID, PLASMA   MAGNESIUM   SARS-COV-2 RDRP GENE   POCT INFLUENZA A/B MOLECULAR   POCT GLUCOSE, HAND-HELD DEVICE     EKG Readings: (Independently Interpreted)   76 bpm. NSR. LAD. RBBB. No STEMI.     ECG Results              EKG 12-lead (In process)        Collection Time Result Time QRS Duration OHS QTC Calculation    02/29/24 10:08:31 02/29/24 10:28:21 146 533                     In process by Interface, Lab In [facility] (02/29/24 10:28:31)                   Narrative:    Test Reason : R41.82,    Vent. Rate : 076 BPM     Atrial Rate : 076 BPM     P-R Int : 156 ms          QRS Dur : 146 ms      QT Int : 474 ms       P-R-T Axes : 065 270 081 degrees     QTc Int : 533 ms    Normal sinus rhythm  Left axis deviation  Right bundle branch block  Cannot rule out Anterior infarct (cited on or before 29-FEB-2024)  Abnormal ECG  When compared with ECG of 29-FEB-2024 09:57,  No significant change was found    Referred By: [email or web address]   SELF           Confirmed By:                                      ECG 12 lead (In process)        Collection Time Result Time QRS Duration OHS QTC Calculation    02/29/24 09:57:44 02/29/24 10:28:11 140 582                     In process by Interface, Lab In [facility] (02/29/24 10:28:19)                    Narrative:    Test Reason : R29.818,    Vent. Rate : 075 BPM     Atrial Rate : 075 BPM     P-R Int : 138 ms          QRS Dur : 140 ms      QT Int : 522 ms       P-R-T Axes : 119 261 103 degrees     QTc Int : 582 ms     Suspect arm lead reversal, interpretation assumes no reversal  Normal sinus rhythm  Right bundle branch block  Cannot rule out Anterior infarct ,age undetermined  T wave abnormality, consider lateral ischemia  Abnormal ECG  When compared with ECG of 07-JUN-2023 12:21,  Sinus rhythm has replaced Electronic ventricular pacemaker    Referred By: [email or web address]   SELF           Confirmed By:                                   Imaging Results              CTA Head and Neck (xpd) (Final result)  Result time 02/29/24 10:01:16      Final result by Greg Aguirre DO (02/29/24 10:01:16)                   Impression:      1. Examination is limited by motion artifact.  2. No large vessel occlusion or high-grade stenosis.  3. Right upper lobe pulmonary nodule measuring up to 2.2 cm.  Recommend further evaluation with tissue sampling, PET-CT, or 3 month follow-up.  4. Worsening mediastinal lymphadenopathy, metastatic disease not excluded.  Recommend attention on follow-up.  5. Interlobular septal thickening and mosaic ground-glass attenuation in the lung apices, concerning for mild pulmonary edema or an atypical infectious process.  6. Left pleural effusion.      Electronically signed by: Greg Aguirre  Date:    02/29/2024  Time:    10:01               Narrative:    EXAMINATION:  CTA HEAD AND NECK (XPD)    CLINICAL HISTORY:  Neuro deficit, acute, stroke suspected;    TECHNIQUE:  CT angiogram was performed from the level of the salena to the top of the head following the IV administration of 100mL of Omnipaque 350.   Sagittal and coronal reconstructions and maximum intensity projection reconstructions were performed. Arterial stenosis percentages are based on NASCET measurement criteria. An immediate  post-contrast CT head was also performed. RapidAI LVO was utilized to measure arterial blood flow in the brain.    COMPARISON:  CT head from earlier today and from 02/09/2021.  CT chest from 07/31/2023.    FINDINGS:  CT head: The ventricles are normal in size without evidence of hydrocephalus. The brain parenchyma is within normal limits.  There is a the left cerebral convexity extra-axial partially calcified mass, likely a meningioma, stable.  No extra-axial blood or fluid collection.    The cranium is intact.  There is complete opacification of the left maxillary sinus and the right sphenoid sinus, with hyperostosis.  There are several mucous retention cysts in the right maxillary sinus.  The mastoid air cells are clear.  There are bilateral prosthetic lenses.      CTA head:    Motion artifact limits evaluation.    Anterior circulation: There is atherosclerosis of the bilateral intracranial ICAs.  The bilateral anterior and middle cerebral arteries are within normal limits, without hemodynamically significant stenosis or occlusion.    Posterior circulation: Vertebrobasilar system is within normal limits without focal abnormality.  The bilateral posterior cerebral arteries are within normal limits, without hemodynamically significant stenosis or occlusion.    There is no intracranial aneurysm.    CTA neck:    Motion artifact limits evaluation    There is a 2 vessel aortic arch.    The common and internal carotid arteries are normal in course and caliber. No significant stenosis in either carotid bifurcation.  Motion artifact significantly limits examination of the bilateral cervical ICAs.    The vertebral origins are patent. Motion artifact significantly limits evaluation of the cervical vertebral arteries.    There are numerous hypodensities in the bilateral thyroid lobes, the largest of which on the right measures up to 3.1 cm.  These nodules can be further evaluated with nonemergent thyroid ultrasound if  clinically indicated.  There is a left pleural effusion.  There are ground-glass opacities in the bilateral lungs with background interstitial lobular septal thickening, suspicious for edema or infection.  There is a 2.2 x 1.5 cm right upper lobe pulmonary nodule (series 2, image 13).  Partially imaged mediastinal lymphadenopathy, including a 1.4 cm right paratracheal node (series 2, image 15), increased in size from prior.  There are leads in the upper chest, resulting in streak artifact.    There are degenerative changes of the cervical spine.                                       CT Head Without Contrast (Edited Result - FINAL)  Result time 02/29/24 10:02:19      Addendum (preliminary) 1 of 1 by Greg Aguirre DO (02/29/24 10:02:19)      There is complete opacification of the left maxillary sinus and the right sphenoid sinus with hyperostosis which can be seen with chronic sinusitis.  There are several mucous retention cysts of the right maxillary sinus.  There is mucosal thickening of the bilateral ethmoid sinuses.  The mastoid air cells are clear.      Electronically signed by: Greg Aguirre  Date:    02/29/2024  Time:    10:02                 Final result by Greg Aguirre DO (02/29/24 09:28:25)                   Impression:      No acute intracranial abnormality.    Stable left cerebral convexity calcified extra-axial mass, likely a meningioma.    Chronic microvascular ischemic changes.      Electronically signed by: Greg Aguirre  Date:    02/29/2024  Time:    09:28               Narrative:    EXAMINATION:  CT HEAD WITHOUT CONTRAST    CLINICAL HISTORY:  Neuro deficit, acute, stroke suspected;    TECHNIQUE:  Low dose axial CT images obtained throughout the head without intravenous contrast. Sagittal and coronal reconstructions were performed.    COMPARISON:  CT head from 02/09/2021.    FINDINGS:  Ventricles and sulci are normal in size for age without evidence of hydrocephalus. No extra-axial blood or  fluid collections.  There is hypoattenuation of the supratentorial white matter, compatible with chronic microvascular ischemic changes, stable.  Stable 3 cm calcified left cerebral convexity extra-axial mass, likely a meningioma.  No parenchymal mass, hemorrhage, edema or major vascular distribution infarct.    No calvarial fracture.  The scalp is unremarkable.  Bilateral paranasal sinuses and mastoid air cells are clear.                                       Medications   calcium gluconate 1 g in NS IVPB (premixed) (1 g Intravenous New Bag 2/29/24 1039)   diphenhydrAMINE injection 50 mg (50 mg Intramuscular Given 2/29/24 0927)   methylPREDNISolone sodium succinate injection 125 mg (125 mg Intravenous Given 2/29/24 0924)   iohexoL (OMNIPAQUE 350) injection 100 mL (100 mLs Intravenous Given 2/29/24 0940)   famotidine (PF) injection 20 mg (20 mg Intravenous Given 2/29/24 0926)     Medical Decision Making  82 y/o F who appears to be in distress presents to the ED via EMS for AMS and code stroke was activated. ABCs intact. Initial triage vitals reveal hypothermia and mild tachypnea.    Ddx includes but is not limited to CVA, TIA, ICH, cardiac arrhythmia, electrolyte abnormality, PNA, PNX, anemia    Amount and/or Complexity of Data Reviewed  Labs: ordered. Decision-making details documented in ED Course.  Radiology: ordered. Decision-making details documented in ED Course.    Risk  Prescription drug management.  Decision regarding hospitalization.               ED Course as of 02/29/24 1040   Thu Feb 29, 2024   0940 CT Head Without Contrast  No acute intracranial abnormality. [BG]   0942 POCT Glucose(!): 210  WNL. [BG]   0957 Discussed the case with Dr. Barajas (neurology) who does not see any focal deficits on exam and he believes the CTA head and neck is normal. No need to proceed with MRI at this time per Dr. Barajas and recommends pursuing a medical cause of AMS. [BG]   0958 POC Creatinine(!): 5.5  ESRD on PD [BG]    1001 Hemoglobin(!): 8.5  Worsened anemia. No active hemorrhage at this time.  [BG]   1001 WBC: 8.45  No leukocytosis. [BG]   1004 CTA Head and Neck (xpd)  No large vessel occlusion or high-grade stenosis. [BG]   1005 SARS-CoV-2 RNA, Amplification, Qual: Negative [BG]   1005 POC Molecular Influenza A Ag: Negative [BG]   1005 POC Molecular Influenza B Ag: Negative [BG]   1024 Calcium(!!): 6.6  Corrected calcium in the setting of hypoalbuminemia is 7.7 mg/dL -- will give the patient IV calcium gluconate.  [BG]   1031 TSH: 0.762  WNL. [BG]   1031 I discussed the case with [facility] who will admit to their service under IP designation for hypocalcemia, hypothermia and AMS. [BG]      ED Course User Index  [BG] Lety Manzano MD                           Clinical Impression:  Final diagnoses:  [R29.818] Acute focal neurological deficit  [R41.82] AMS (altered mental status)          ED Disposition Condition    Admit                 Lety Manzano MD  Resident  02/29/24 1035       Lety Manzano MD  Resident  02/29/24 1040

## 2024-02-29 NOTE — PROGRESS NOTES
Pharmacist Renal Dose Adjustment Note    Myesha Quinones is a 83 y.o. female being treated with the medication  lovenox    Patient Data:    Vital Signs (Most Recent):  Temp: (P) 97.4 °F (36.3 °C) (Simultaneous filing. User may not have seen previous data.) (02/29/24 1700)  Pulse: 80 (02/29/24 1700)  Resp: 16 (02/29/24 1700)  BP: (!) 159/70 (02/29/24 1700)  SpO2: 95 % (02/29/24 1650) Vital Signs (72h Range):  Temp:  [89.3 °F (31.8 °C)-97.8 °F (36.6 °C)]   Pulse:  [71-85]   Resp:  [14-25]   BP: (117-168)/(50-71)   SpO2:  [95 %-100 %]      Recent Labs   Lab 02/29/24  0946   CREATININE 5.1*     Serum creatinine: 5.1 mg/dL (H) 02/29/24 0946  Estimated creatinine clearance: 6.8 mL/min (A)    Medication:lovenox  dose: 30 mg frequency daily will be changed to medication:heparin dose:5000 units frequency: q 12    Pharmacist's Name: Jakub Simms  Pharmacist's Extension: [phone]

## 2024-02-29 NOTE — ED NOTES
CTA recommended, pt allergic to contrast will premedicate and monitor. Pt remains on table awaiting premeds.

## 2024-02-29 NOTE — ASSESSMENT & PLAN NOTE
-appears to have recovered EF; did have 45% EF in the past  -volume overloaded now in the setting ESRD   -peritoneal dialysis today

## 2024-02-29 NOTE — H&P
[facility] Emergency [facility] Medicine  History & Physical    Patient Name: Myesha Quinones  MRN: [id number]  Patient Class: IP- Inpatient  Admission Date: 2/29/2024  Attending Physician: Danish Reyes MD  Primary Care Provider: Dayton Michael MD         Patient information was obtained from relative(s), past medical records, and ER records.     Subjective:     Principal Problem:<principal problem not specified>    Chief Complaint:   Chief Complaint   Patient presents with    Altered Mental Status     Last seen normal at bedtime last night ~10pm. Family members activated EMS this morning when they found patient to have AMS. EMS reports patient verbally unresponsive at scene - given Narcan 2 mg IVP with some improvement. On arrival to ED, awake but verbally unresponsive. Follows commands but not moving left side.        HPI: Ms. Quinones is an 83-year-old woman with ESRD on PD, type 2 diabetes, and hypertension who presents with encephalopathy.  History obtained mostly via daughter at bedside.  She reports that over the past 2 days prior to admission her mother had reported some  generalized fatigue and discomfort but no focalizing symptoms. Reportedly was started on his intake and took 1st dose yesterday.  Daughter states that they normally speak to her on the phone in the morning but did not hear from her at 7:00 a.m. as usual.  Around 8:00 a.m. she had her  drive by to check on her and at that point she was found down prompting call to EMS.  Initially CABG was 65 and she was given glucose without response.    Past Medical History:   Diagnosis Date    Acute encephalopathy 2/29/2024    Acute hypoxemic respiratory failure 12/19/2019    Acute right-sided thoracic back pain 12/09/2019    Allergy     Asthma     Basal cell carcinoma     left forehead    Basal cell carcinoma     left nose    Basal cell carcinoma 05/20/2015    right nose    Basal cell carcinoma 12/22/2015    left lower post neck     Basal cell carcinoma 12/03/2019    left ant scalp     BCC (basal cell carcinoma of skin) 10/20/2022    Right alar groove    Bilateral pleural effusion     Bilateral renal cysts     Breast cancer     CAD (coronary artery disease)     Cardiomyopathy     Cardiomyopathy, ischemic     Cataract     CHF (congestive heart failure)     CKD (chronic kidney disease) stage 4, GFR 15-29 ml/min     Colon polyp 2011    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, with long-term current use of insulin 02/22/2018    COPD (chronic obstructive pulmonary disease)     COPD exacerbation 04/08/2018    Current mild episode of major depressive disorder without prior episode 01/25/2022    Defibrillator discharge     Dependence on renal dialysis 08/22/2023    Diabetes mellitus     Diabetes mellitus type II     Diabetes with neurologic complications     Edema     ESRD needing dialysis     Goiter     MNG    Hematuria, unspecified     HX: breast cancer     Hyperlipidemia     Hypertension     Hypocalcemia     Hypokalemia     Hyponatremia     Hyponatremia 04/02/2022    Iron deficiency anemia 05/16/2017    Iron deficiency anemia     Iron deficiency anemia     Left kidney mass     Meningioma     Microalbuminuria due to type 2 diabetes mellitus 01/26/2022    Osteoporosis, postmenopausal     Pneumonia 12/08/2019    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    Proteinuria 01/21/2019    Pseudomonas pneumonia     SCC (squamous cell carcinoma) 08/25/2022    right posterior neck    Skin cancer     s/p excision    Sleep apnea     CPAP    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/2015    mid forehead    Unspecified vitamin D deficiency     UTI (urinary tract infection) 04/02/2022    Ventricular tachycardia     Vitamin B12 deficiency     Vitamin D deficiency disease        Past Surgical History:   Procedure Laterality Date    BASAL CELL CARCINOMA EXCISION      posterior neck and nose    BREAST  BIOPSY      BREAST CYST EXCISION Left     BREAST SURGERY      CARDIAC DEFIBRILLATOR PLACEMENT      x 2    CATARACT EXTRACTION W/  INTRAOCULAR LENS IMPLANT Bilateral     CHOLECYSTECTOMY      COLONOSCOPY N/A 11/5/2019    Procedure: COLONOSCOPY;  Surgeon: Boaz Botello MD;  Location: [facility] ([address] [place] [address]);  Service: Endoscopy;  Laterality: N/A;  AICD - [employer] -     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    FRACTURE SURGERY      left elbow and wrist as a child    HYSTERECTOMY      INSERTION, CATHETER, DIALYSIS, PERITONEAL, LAPAROSCOPIC N/A 4/25/2023    Procedure: INSERTION, CATHETER, DIALYSIS, PERITONEAL, LAPAROSCOPIC;  Surgeon: Marcelo Isaac MD;  Location: [facility] OR;  Service: General;  Laterality: N/A;    LAPAROSCOPIC LYSIS OF ADHESIONS N/A 4/25/2023    Procedure: LYSIS, ADHESIONS, LAPAROSCOPIC;  Surgeon: Marcelo Isaac MD;  Location: [facility] OR;  Service: General;  Laterality: N/A;    MASTECTOMY Right     OMENTOPEXY, LAPAROSCOPIC N/A 4/25/2023    Procedure: OMENTOPEXY, LAPAROSCOPIC;  Surgeon: Marcelo Isaac MD;  Location: [facility] OR;  Service: General;  Laterality: N/A;    REPLACEMENT OF IMPLANTABLE CARDIOVERTER-DEFIBRILLATOR (ICD) GENERATOR N/A 12/17/2018    Procedure: REPLACEMENT, ICD GENERATOR;  Surgeon: Jan Mckeon MD;  Location: [facility] EP LAB;  Service: Cardiology;  Laterality: N/A;  DONNA, CRT-D gen change, MDT, MAC, SK, 3 Prep    REVISION OF SKIN POCKET FOR CARDIOVERTER-DEFIBRILLATOR  12/17/2018    Procedure: Revision, Skin Pocket, For Cardioverter-Defibrillator;  Surgeon: Jan Mckeon MD;  Location: [facility] EP LAB;  Service: Cardiology;;    SQUAMOUS CELL CARCINOMA EXCISION      remved from forehead    TONSILLECTOMY         Review of patient's allergies indicates:   Allergen Reactions    Iodine and iodide containing products Hives and Other (See Comments)     Not specified     Nifedipine      weakness       No current facility-administered medications on file prior to encounter.     Current  Outpatient Medications on File Prior to Encounter   Medication Sig    acetaminophen (TYLENOL) 500 MG tablet Take 500 mg by mouth as needed.    albuterol (PROVENTIL/VENTOLIN HFA) 90 mcg/actuation inhaler Inhale 2 puffs into the lungs every 6 (six) hours as needed for Wheezing. Rescue    albuterol-ipratropium (DUO-NEB) 2.5 mg-0.5 mg/3 mL nebulizer solution Take 3 mLs by nebulization every 4 (four) hours as needed for Wheezing.    aspirin 81 MG Chew Take 81 mg by mouth once daily.    carvediloL (COREG) 25 MG tablet TAKE 2 TABLETS (50 MG TOTAL) BY MOUTH 2 (TWO) TIMES DAILY.    cholecalciferol, vitamin D3, (VITAMIN D3) 50 mcg (2,000 unit) Tab Take 1 tablet by mouth once daily.     cyanocobalamin (VITAMIN B-12) 100 MCG tablet Take 100 mcg by mouth once daily.    epoetin [place]-epbx (RETACRIT) 10,000 unit/mL imjection Inject 1 mL (10,000 Units total) into the skin every 30 days.    ergocalciferol (ERGOCALCIFEROL) 50,000 unit Cap Take 50,000 Units by mouth every 7 days.    estradioL (ESTRACE) 0.01 % (0.1 mg/gram) vaginal cream PLACE 1 GRAM VAGINALLY EVERY OTHER DAY    estradioL (ESTRING) 2 mg (7.5 mcg /24 hour) vaginal ring Place 2 mg vaginally every 3 (three) months.    fluticasone propionate (FLONASE) 50 mcg/actuation nasal spray 1 spray by Each Nostril route daily as needed.    furosemide (LASIX) 40 MG tablet Take 1 tablet (40 mg total) by mouth once daily. (Patient taking differently: Take 40 mg by mouth daily as needed.)    hydrALAZINE (APRESOLINE) 100 MG tablet Take 1 tablet (100 mg total) by mouth 2 (two) times daily.    insulin (LANTUS SOLOSTAR U-100 INSULIN) glargine 100 units/mL SubQ pen Inject 10 Units into the skin every evening. (Patient taking differently: Inject 15 Units into the skin every evening.)    lisinopriL (PRINIVIL,ZESTRIL) 5 MG tablet Take 5 mg by mouth once daily.    montelukast (SINGULAIR) 10 mg tablet Take 1 tablet (10 mg total) by mouth once daily.    nitroGLYCERIN (NITROSTAT) 0.4 MG SL tablet  "Place 0.4 mg under the tongue every 5 (five) minutes as needed for Chest pain.     omega-3 fatty acids 500 mg Cap Take 1 capsule by mouth Daily.    OZEMPIC 0.25 mg or 0.5 mg (2 mg/3 mL) pen injector Inject 0.25 mg into the skin every Wednesday.    pravastatin (PRAVACHOL) 40 MG tablet Take 1 tablet (40 mg total) by mouth once daily.    valsartan (DIOVAN) 160 MG tablet Take 160 mg by mouth once daily.    [DISCONTINUED] valsartan (DIOVAN) 320 MG tablet Take 320 mg by mouth.    blood sugar diagnostic (ACCU-CHEK HECTOR) Strp Uses Accu-Check Hector meter to test BG 4x/day    lancets (ACCU-CHEK SOFTCLIX LANCETS) Misc Uses Accu-Chek Hector meter to test BG 2x/day    pen needle, diabetic (BD ULTRA-FINE MINI PEN NEEDLE) 31 gauge x 3/16" Ndle Use with insulin twice a day.    sevelamer carbonate (RENVELA) 800 mg Tab Take 800 mg by mouth 3 (three) times daily.    [DISCONTINUED] sodium bicarbonate 650 MG tablet Take 2 tablets (1,300 mg total) by mouth 2 (two) times daily.    [DISCONTINUED] valsartan (DIOVAN) 80 MG tablet Take 1 tablet (80 mg total) by mouth once daily.     Family History       Problem Relation (Age of Onset)    Asthma Mother    Cancer Brother, Daughter, Son    Cardiomyopathy Father    Cerebral aneurysm Brother    Diabetes Father, Sister, Brother, Brother, Brother, Brother, Daughter, Son, Son    Heart attack Sister, Brother    Heart disease Sister, Brother, Brother, Brother    Hypertension Mother, Brother, Brother    Melanoma Daughter    No Known Problems Maternal Grandmother, Maternal Grandfather, Paternal Grandmother, Paternal Grandfather    Obesity Daughter    Stroke Mother          Tobacco Use    Smoking status: Never     Passive exposure: Never    Smokeless tobacco: Never   Substance and Sexual Activity    Alcohol use: No     Alcohol/week: 0.0 standard drinks of alcohol    Drug use: No    Sexual activity: Yes     Partners: Male     Review of Systems   Unable to perform ROS: Mental status change     Objective: "     Vital Signs (Most Recent):  Temp: (!) 92.3 °F (33.5 °C) (02/29/24 1340)  Pulse: 81 (02/29/24 1405)  Resp: 20 (02/29/24 1405)  BP: 132/61 (02/29/24 1340)  SpO2: 97 % (02/29/24 1405) Vital Signs (24h Range):  Temp:  [89.3 °F (31.8 °C)-92.3 °F (33.5 °C)] 92.3 °F (33.5 °C)  Pulse:  [71-85] 81  Resp:  [14-25] 20  SpO2:  [95 %-100 %] 97 %  BP: (117-168)/(50-68) 132/61        There is no height or weight on file to calculate BMI.     Physical Exam  Vitals reviewed.   Constitutional:       General: She is not in acute distress.     Appearance: She is well-developed. She is ill-appearing. She is not diaphoretic.      Comments:   Frail   HENT:      Head: Normocephalic and atraumatic.      Nose: Nose normal.   Eyes:      General: No scleral icterus.     Pupils: Pupils are equal, round, and reactive to light.   Neck:      Vascular: No JVD.      Trachea: No tracheal deviation.   Cardiovascular:      Rate and Rhythm: Normal rate and regular rhythm.      Heart sounds: Normal heart sounds.   Pulmonary:      Effort: Pulmonary effort is normal. No respiratory distress.      Breath sounds: Normal breath sounds.   Abdominal:      General: There is no distension.      Palpations: Abdomen is soft.      Tenderness: There is no abdominal tenderness.      Comments:  PD catheter in place   Musculoskeletal:         General: No deformity.      Cervical back: Normal range of motion.   Skin:     General: Skin is warm and dry.      Findings: No rash.   Neurological:      Mental Status: She is oriented to person, place, and time. She is lethargic.   Psychiatric:         Behavior: Behavior normal.              CRANIAL NERVES     CN III, IV, VI   Pupils are equal, round, and reactive to light.       Significant Labs: All pertinent labs within the past 24 hours have been reviewed.    Significant Imaging: I have reviewed all pertinent imaging results/findings within the past 24 hours.  Assessment/Plan:     Type 2 diabetes mellitus with  hypoglycemia, with long-term current use of insulin   -hypoglycemic this a.m.; suspect this may be secondary to a missed dialysis session  - have decreased home long-acting insulin dose and consulted Nephrology for dialysis  - improved now after administration of dextrose      Hypocalcemia  Patient has hypocalcemia due to ESRD related to vitamin D disorder which is currently uncontrolled, and has been confirmed with ionized and/or corrected calcium. Will replace calcium and monitor electrolytes closely. The latest calcium labs have been reviewed and are listed below.  Recent Labs   Lab 02/29/24  0946   CALCIUM 6.6*       Recent Labs   Lab 02/29/24  1339   CAION 0.91*           Acute metabolic encephalopathy   -suspect this is secondary to infection versus uremia versus medication side effect  -TSH within normal limits; infectious workup as above   -appears to be improving with correction of hypothermia   -initiated on antibiotics  -Initially stroke activated in the ED; CT and CTA not consistent with stroke, unable to undergo MRI due to ICD placement but overall picture is much more consistent with metabolic process rather than CVA      ESRD on peritoneal dialysis   -with volume overload   -nephrology consulted  -will need to sample peritoneal fluid and performed PD      Essential hypertension  Chronic, controlled. Latest blood pressure and vitals reviewed-     Temp:  [89.3 °F (31.8 °C)-94.7 °F (34.8 °C)]   Pulse:  [71-85]   Resp:  [14-25]   BP: (117-168)/(50-68)   SpO2:  [95 %-100 %] .   Home meds for hypertension were reviewed and noted below.   Hypertension Medications               carvediloL (COREG) 25 MG tablet TAKE 2 TABLETS (50 MG TOTAL) BY MOUTH 2 (TWO) TIMES DAILY.    furosemide (LASIX) 40 MG tablet Take 1 tablet (40 mg total) by mouth once daily.    hydrALAZINE (APRESOLINE) 100 MG tablet Take 1 tablet (100 mg total) by mouth 2 (two) times daily.    lisinopriL (PRINIVIL,ZESTRIL) 5 MG tablet Take 5 mg by  mouth once daily.    nitroGLYCERIN (NITROSTAT) 0.4 MG SL tablet Place 0.4 mg under the tongue every 5 (five) minutes as needed for Chest pain.     valsartan (DIOVAN) 160 MG tablet Take 160 mg by mouth once daily.            While in the hospital, will manage blood pressure as follows; Adjust home antihypertensive regimen as follows- holding home antihypertensives for now as patient is normotensive and confused; will resume piecemeal once patient is able to safely swallow    Will utilize p.r.n. blood pressure medication only if patient's blood pressure greater than 180/110 and she develops symptoms such as worsening chest pain or shortness of breath.    History of non anemic vitamin B12 deficiency   -resume B12      Hypothermia   -concern for possible infectious etiology  -Initiated on infectious workup in the ED; chest x-ray with edema, UA pending, blood cultures collected; flu and COVID negative   - no focalizing symptoms  -Price Arredondo   -initiate on IV ceftriaxone for possible peritonitis; discussed with Nephrology and will check fluid      Hyponatremia  Patient has hyponatremia which is controlled,We will aim to correct the sodium by 4-6mEq in 24 hours. We will monitor sodium Daily. The hyponatremia is due to renal insufficiency. We will obtain the following studies: TSH, T4. We will treat the hyponatremia with Hemodialysis. The patient's sodium results have been reviewed and are listed below.  Recent Labs   Lab 02/29/24  0946   *       Chronic combined systolic and diastolic heart failure   -appears to have recovered EF; did have 45% EF in the past  -volume overloaded now in the setting ESRD   -peritoneal dialysis today      Hyperlipidemia associated with type 2 diabetes mellitus   -resume home statin once more alert and tolerating p.o.       Metabolic bone disease   -continue home calcitriol and bicarb  -continue vitamin D      Iron deficiency anemia  Patient's anemia is currently controlled. Has not  received any PRBCs to date. Etiology likely d/t chronic disease due to Chronic Kidney Disease/ESRD  Current CBC reviewed-   Lab Results   Component Value Date    HGB 8.5 (L) 02/29/2024    HCT 25.4 (L) 02/29/2024     Monitor serial CBC and transfuse if patient becomes hemodynamically unstable, symptomatic or H/H drops below 7/21.    Biventricular ICD (implantable cardioverter-defibrillator) in place   -noted  -no evidence infection        VTE Risk Mitigation (From admission, onward)           Ordered     enoxaparin injection 30 mg  Daily         02/29/24 1050     IP VTE HIGH RISK PATIENT  Once         02/29/24 1050     Place sequential compression device  Until discontinued         02/29/24 1050                       40 minutes of critical care time was spent personally by me on the following activities: development of treatment plan with patient or surrogate and bedside caregivers, discussions with consultants, evaluation of patient's response to treatment, examination of patient, ordering and performing treatments and interventions, ordering and review of laboratory studies, ordering and review of radiographic studies, pulse oximetry, re-evaluation of patient's condition. This critical care time did not overlap with that of any other provider or involve time for any procedures.             02/29/24 1405   Vitals   Pulse 81   Resp 20   SpO2 97 %   Peritoneal Dialysis   Exchange Type Manual   Peritoneal Treatment Status Completed   Dianeal Solution Dextrose 1.5% in 2000 mL   Cycler Peritoneal Dialysis   Number of Cycles 1   Fill Volume (mL) 1000 mL   Total Volume (mL) 1000 mL   Dwell Time (specify hr/min) 2   Cycler Treatment Comments 1L manual fill completed. Dwell x 2 hours, per MD order.     Manual fill tolerated well. Dressing change to PD catheter exit site performed. No s/s of infection noted. Catheter clamped,capped,and secured with adhesive cloth tape. Patient and family @ BS agreeable with plan of  care.    Danish Reyes MD  Department of Hospital Medicine  [facility] - Emergency Dept

## 2024-02-29 NOTE — ASSESSMENT & PLAN NOTE
Patient has hyponatremia which is controlled,We will aim to correct the sodium by 4-6mEq in 24 hours. We will monitor sodium Daily. The hyponatremia is due to renal insufficiency. We will obtain the following studies: TSH, T4. We will treat the hyponatremia with Hemodialysis. The patient's sodium results have been reviewed and are listed below.  Recent Labs   Lab 02/29/24  0946   *

## 2024-02-29 NOTE — ED NOTES
Assumed care of pt. Stroke code paged overhead from triage. EMS offloading pt into ED room at this time. Pt is minimally responsive at this time. Pt transferred to CT via stretcher via nurse and NREMT.

## 2024-02-29 NOTE — SUBJECTIVE & OBJECTIVE
Past Medical History:   Diagnosis Date    Acute encephalopathy 2/29/2024    Acute hypoxemic respiratory failure 12/19/2019    Acute right-sided thoracic back pain 12/09/2019    Allergy     Asthma     Basal cell carcinoma     left forehead    Basal cell carcinoma     left nose    Basal cell carcinoma 05/20/2015    right nose    Basal cell carcinoma 12/22/2015    left lower post neck    Basal cell carcinoma 12/03/2019    left ant scalp     BCC (basal cell carcinoma of skin) 10/20/2022    Right alar groove    Bilateral pleural effusion     Bilateral renal cysts     Breast cancer     CAD (coronary artery disease)     Cardiomyopathy     Cardiomyopathy, ischemic     Cataract     CHF (congestive heart failure)     CKD (chronic kidney disease) stage 4, GFR 15-29 ml/min     Colon polyp 2011    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, with long-term current use of insulin 02/22/2018    COPD (chronic obstructive pulmonary disease)     COPD exacerbation 04/08/2018    Current mild episode of major depressive disorder without prior episode 01/25/2022    Defibrillator discharge     Dependence on renal dialysis 08/22/2023    Diabetes mellitus     Diabetes mellitus type II     Diabetes with neurologic complications     Edema     ESRD needing dialysis     Goiter     MNG    Hematuria, unspecified     HX: breast cancer     Hyperlipidemia     Hypertension     Hypocalcemia     Hypokalemia     Hyponatremia     Hyponatremia 04/02/2022    Iron deficiency anemia 05/16/2017    Iron deficiency anemia     Iron deficiency anemia     Left kidney mass     Meningioma     Microalbuminuria due to type 2 diabetes mellitus 01/26/2022    Osteoporosis, postmenopausal     Pneumonia 12/08/2019    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    Proteinuria 01/21/2019    Pseudomonas pneumonia     SCC (squamous cell carcinoma) 08/25/2022    right posterior neck    Skin cancer     s/p  excision    Sleep apnea     CPAP    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/2015    mid forehead    Unspecified vitamin D deficiency     UTI (urinary tract infection) 04/02/2022    Ventricular tachycardia     Vitamin B12 deficiency     Vitamin D deficiency disease        Past Surgical History:   Procedure Laterality Date    BASAL CELL CARCINOMA EXCISION      posterior neck and nose    BREAST BIOPSY      BREAST CYST EXCISION Left     BREAST SURGERY      CARDIAC DEFIBRILLATOR PLACEMENT      x 2    CATARACT EXTRACTION W/  INTRAOCULAR LENS IMPLANT Bilateral     CHOLECYSTECTOMY      COLONOSCOPY N/A 11/5/2019    Procedure: COLONOSCOPY;  Surgeon: Boaz Botello MD;  Location: [address]);  Service: Endoscopy;  Laterality: N/A;  AICD - [employer] -     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    FRACTURE SURGERY      left elbow and wrist as a child    HYSTERECTOMY      INSERTION, CATHETER, DIALYSIS, PERITONEAL, LAPAROSCOPIC N/A 4/25/2023    Procedure: INSERTION, CATHETER, DIALYSIS, PERITONEAL, LAPAROSCOPIC;  Surgeon: Marcelo Isaac MD;  Location: [facility];  Service: General;  Laterality: N/A;    LAPAROSCOPIC LYSIS OF ADHESIONS N/A 4/25/2023    Procedure: LYSIS, ADHESIONS, LAPAROSCOPIC;  Surgeon: Marcelo Isaac MD;  Location: [facility] OR;  Service: General;  Laterality: N/A;    MASTECTOMY Right     OMENTOPEXY, LAPAROSCOPIC N/A 4/25/2023    Procedure: OMENTOPEXY, LAPAROSCOPIC;  Surgeon: Marcelo Isaac MD;  Location: [facility];  Service: General;  Laterality: N/A;    REPLACEMENT OF IMPLANTABLE CARDIOVERTER-DEFIBRILLATOR (ICD) GENERATOR N/A 12/17/2018    Procedure: REPLACEMENT, ICD GENERATOR;  Surgeon: Jan Mckeon MD;  Location: [facility] EP LAB;  Service: Cardiology;  Laterality: N/A;  DONNA, CRT-D gen change, MDT, MAC, SK, 3 Prep    REVISION OF SKIN POCKET FOR CARDIOVERTER-DEFIBRILLATOR  12/17/2018    Procedure: Revision, Skin Pocket, For Cardioverter-Defibrillator;  Surgeon: Jan Mckeon MD;  Location: [facility] EP LAB;   Service: Cardiology;;    SQUAMOUS CELL CARCINOMA EXCISION      remved from forehead    TONSILLECTOMY         Review of patient's allergies indicates:   Allergen Reactions    Iodine and iodide containing products Hives and Other (See Comments)     Not specified     Nifedipine      weakness       No current facility-administered medications on file prior to encounter.     Current Outpatient Medications on File Prior to Encounter   Medication Sig    acetaminophen (TYLENOL) 500 MG tablet Take 500 mg by mouth as needed.    albuterol (PROVENTIL/VENTOLIN HFA) 90 mcg/actuation inhaler Inhale 2 puffs into the lungs every 6 (six) hours as needed for Wheezing. Rescue    albuterol-ipratropium (DUO-NEB) 2.5 mg-0.5 mg/3 mL nebulizer solution Take 3 mLs by nebulization every 4 (four) hours as needed for Wheezing.    aspirin 81 MG Chew Take 81 mg by mouth once daily.    carvediloL (COREG) 25 MG tablet TAKE 2 TABLETS (50 MG TOTAL) BY MOUTH 2 (TWO) TIMES DAILY.    cholecalciferol, vitamin D3, (VITAMIN D3) 50 mcg (2,000 unit) Tab Take 1 tablet by mouth once daily.     cyanocobalamin (VITAMIN B-12) 100 MCG tablet Take 100 mcg by mouth once daily.    epoetin [place]-epbx (RETACRIT) 10,000 unit/mL imjection Inject 1 mL (10,000 Units total) into the skin every 30 days.    ergocalciferol (ERGOCALCIFEROL) 50,000 unit Cap Take 50,000 Units by mouth every 7 days.    estradioL (ESTRACE) 0.01 % (0.1 mg/gram) vaginal cream PLACE 1 GRAM VAGINALLY EVERY OTHER DAY    estradioL (ESTRING) 2 mg (7.5 mcg /24 hour) vaginal ring Place 2 mg vaginally every 3 (three) months.    fluticasone propionate (FLONASE) 50 mcg/actuation nasal spray 1 spray by Each Nostril route daily as needed.    furosemide (LASIX) 40 MG tablet Take 1 tablet (40 mg total) by mouth once daily. (Patient taking differently: Take 40 mg by mouth daily as needed.)    hydrALAZINE (APRESOLINE) 100 MG tablet Take 1 tablet (100 mg total) by mouth 2 (two) times daily.    insulin (LANTUS SOLOSTAR  "U-100 INSULIN) glargine 100 units/mL SubQ pen Inject 10 Units into the skin every evening. (Patient taking differently: Inject 15 Units into the skin every evening.)    lisinopriL (PRINIVIL,ZESTRIL) 5 MG tablet Take 5 mg by mouth once daily.    montelukast (SINGULAIR) 10 mg tablet Take 1 tablet (10 mg total) by mouth once daily.    nitroGLYCERIN (NITROSTAT) 0.4 MG SL tablet Place 0.4 mg under the tongue every 5 (five) minutes as needed for Chest pain.     omega-3 fatty acids 500 mg Cap Take 1 capsule by mouth Daily.    OZEMPIC 0.25 mg or 0.5 mg (2 mg/3 mL) pen injector Inject 0.25 mg into the skin every Wednesday.    pravastatin (PRAVACHOL) 40 MG tablet Take 1 tablet (40 mg total) by mouth once daily.    valsartan (DIOVAN) 160 MG tablet Take 160 mg by mouth once daily.    [DISCONTINUED] valsartan (DIOVAN) 320 MG tablet Take 320 mg by mouth.    blood sugar diagnostic (ACCU-CHEK HECTOR) Strp Uses Accu-Check Hector meter to test BG 4x/day    lancets (ACCU-CHEK SOFTCLIX LANCETS) Misc Uses Accu-Chek Hector meter to test BG 2x/day    pen needle, diabetic (BD ULTRA-FINE MINI PEN NEEDLE) 31 gauge x 3/16" Ndle Use with insulin twice a day.    sevelamer carbonate (RENVELA) 800 mg Tab Take 800 mg by mouth 3 (three) times daily.    [DISCONTINUED] sodium bicarbonate 650 MG tablet Take 2 tablets (1,300 mg total) by mouth 2 (two) times daily.    [DISCONTINUED] valsartan (DIOVAN) 80 MG tablet Take 1 tablet (80 mg total) by mouth once daily.     Family History       Problem Relation (Age of Onset)    Asthma Mother    Cancer Brother, Daughter, Son    Cardiomyopathy Father    Cerebral aneurysm Brother    Diabetes Father, Sister, Brother, Brother, Brother, Brother, Daughter, Son, Son    Heart attack Sister, Brother    Heart disease Sister, Brother, Brother, Brother    Hypertension Mother, Brother, Brother    Melanoma Daughter    No Known Problems Maternal Grandmother, Maternal Grandfather, Paternal Grandmother, Paternal Grandfather    " Obesity Daughter    Stroke Mother          Tobacco Use    Smoking status: Never     Passive exposure: Never    Smokeless tobacco: Never   Substance and Sexual Activity    Alcohol use: No     Alcohol/week: 0.0 standard drinks of alcohol    Drug use: No    Sexual activity: Yes     Partners: Male     Review of Systems   Unable to perform ROS: Mental status change     Objective:     Vital Signs (Most Recent):  Temp: (!) 92.3 °F (33.5 °C) (02/29/24 1340)  Pulse: 81 (02/29/24 1405)  Resp: 20 (02/29/24 1405)  BP: 132/61 (02/29/24 1340)  SpO2: 97 % (02/29/24 1405) Vital Signs (24h Range):  Temp:  [89.3 °F (31.8 °C)-92.3 °F (33.5 °C)] 92.3 °F (33.5 °C)  Pulse:  [71-85] 81  Resp:  [14-25] 20  SpO2:  [95 %-100 %] 97 %  BP: (117-168)/(50-68) 132/61        There is no height or weight on file to calculate BMI.     Physical Exam  Vitals reviewed.   Constitutional:       General: She is not in acute distress.     Appearance: She is well-developed. She is ill-appearing. She is not diaphoretic.      Comments:   Frail   HENT:      Head: Normocephalic and atraumatic.      Nose: Nose normal.   Eyes:      General: No scleral icterus.     Pupils: Pupils are equal, round, and reactive to light.   Neck:      Vascular: No JVD.      Trachea: No tracheal deviation.   Cardiovascular:      Rate and Rhythm: Normal rate and regular rhythm.      Heart sounds: Normal heart sounds.   Pulmonary:      Effort: Pulmonary effort is normal. No respiratory distress.      Breath sounds: Normal breath sounds.   Abdominal:      General: There is no distension.      Palpations: Abdomen is soft.      Tenderness: There is no abdominal tenderness.      Comments:  PD catheter in place   Musculoskeletal:         General: No deformity.      Cervical back: Normal range of motion.   Skin:     General: Skin is warm and dry.      Findings: No rash.   Neurological:      Mental Status: She is oriented to person, place, and time. She is lethargic.   Psychiatric:          Behavior: Behavior normal.              CRANIAL NERVES     CN III, IV, VI   Pupils are equal, round, and reactive to light.       Significant Labs: All pertinent labs within the past 24 hours have been reviewed.    Significant Imaging: I have reviewed all pertinent imaging results/findings within the past 24 hours.

## 2024-03-01 LAB
25(OH)D3+25(OH)D2 SERPL-MCNC: 23 NG/ML (ref 30–96)
ALBUMIN SERPL BCP-MCNC: 2.5 G/DL (ref 3.5–5.2)
ALBUMIN SERPL BCP-MCNC: 2.6 G/DL (ref 3.5–5.2)
ALP SERPL-CCNC: 173 U/L (ref 55–135)
ALT SERPL W/O P-5'-P-CCNC: 25 U/L (ref 10–44)
ANION GAP SERPL CALC-SCNC: 19 MMOL/L (ref 8–16)
ANION GAP SERPL CALC-SCNC: 20 MMOL/L (ref 8–16)
ASCENDING AORTA: 2.27 CM
AST SERPL-CCNC: 17 U/L (ref 10–40)
AV INDEX (PROSTH): 0.57
AV MEAN GRADIENT: 5 MMHG
AV PEAK GRADIENT: 8 MMHG
AV VALVE AREA BY VELOCITY RATIO: 1.5 CM²
AV VALVE AREA: 1.55 CM²
AV VELOCITY RATIO: 0.55
BASOPHILS # BLD AUTO: 0.02 K/UL (ref 0–0.2)
BASOPHILS NFR BLD: 0.2 % (ref 0–1.9)
BILIRUB SERPL-MCNC: 0.3 MG/DL (ref 0.1–1)
BSA FOR ECHO PROCEDURE: 1.69 M2
BUN SERPL-MCNC: 89 MG/DL (ref 8–23)
BUN SERPL-MCNC: 89 MG/DL (ref 8–23)
CALCIUM SERPL-MCNC: 7.5 MG/DL (ref 8.7–10.5)
CALCIUM SERPL-MCNC: 7.5 MG/DL (ref 8.7–10.5)
CHLORIDE SERPL-SCNC: 101 MMOL/L (ref 95–110)
CHLORIDE SERPL-SCNC: 101 MMOL/L (ref 95–110)
CO2 SERPL-SCNC: 12 MMOL/L (ref 23–29)
CO2 SERPL-SCNC: 12 MMOL/L (ref 23–29)
CREAT SERPL-MCNC: 4.7 MG/DL (ref 0.5–1.4)
CREAT SERPL-MCNC: 4.7 MG/DL (ref 0.5–1.4)
CV ECHO LV RWT: 0.49 CM
DIFFERENTIAL METHOD BLD: ABNORMAL
DOP CALC AO PEAK VEL: 1.45 M/S
DOP CALC AO VTI: 35.8 CM
DOP CALC LVOT AREA: 2.7 CM2
DOP CALC LVOT DIAMETER: 1.86 CM
DOP CALC LVOT PEAK VEL: 0.8 M/S
DOP CALC LVOT STROKE VOLUME: 55.4 CM3
DOP CALC MV VTI: 28.4 CM
DOP CALCLVOT PEAK VEL VTI: 20.4 CM
E WAVE DECELERATION TIME: 181.8 MSEC
E/A RATIO: 1.37
E/E' RATIO: 16.4 M/S
ECHO LV POSTERIOR WALL: 1.03 CM (ref 0.6–1.1)
EOSINOPHIL # BLD AUTO: 0 K/UL (ref 0–0.5)
EOSINOPHIL NFR BLD: 0 % (ref 0–8)
ERYTHROCYTE [DISTWIDTH] IN BLOOD BY AUTOMATED COUNT: 13.3 % (ref 11.5–14.5)
EST. GFR  (NO RACE VARIABLE): 9 ML/MIN/1.73 M^2
EST. GFR  (NO RACE VARIABLE): 9 ML/MIN/1.73 M^2
FRACTIONAL SHORTENING: 34 % (ref 28–44)
GLUCOSE SERPL-MCNC: 411 MG/DL (ref 70–110)
GLUCOSE SERPL-MCNC: 414 MG/DL (ref 70–110)
HCT VFR BLD AUTO: 26.2 % (ref 37–48.5)
HGB BLD-MCNC: 8.6 G/DL (ref 12–16)
IMM GRANULOCYTES # BLD AUTO: 0.08 K/UL (ref 0–0.04)
IMM GRANULOCYTES NFR BLD AUTO: 0.8 % (ref 0–0.5)
INTERVENTRICULAR SEPTUM: 0.85 CM (ref 0.6–1.1)
IVC DIAMETER: 2.07 CM
LA MAJOR: 5.13 CM
LA MINOR: 5.32 CM
LA WIDTH: 4.4 CM
LEFT ATRIUM SIZE: 3.87 CM
LEFT ATRIUM VOLUME INDEX MOD: 38 ML/M2
LEFT ATRIUM VOLUME INDEX: 45.3 ML/M2
LEFT ATRIUM VOLUME MOD: 63.4 CM3
LEFT ATRIUM VOLUME: 75.6 CM3
LEFT INTERNAL DIMENSION IN SYSTOLE: 2.78 CM (ref 2.1–4)
LEFT VENTRICLE DIASTOLIC VOLUME INDEX: 47.86 ML/M2
LEFT VENTRICLE DIASTOLIC VOLUME: 79.92 ML
LEFT VENTRICLE MASS INDEX: 76 G/M2
LEFT VENTRICLE SYSTOLIC VOLUME INDEX: 17.4 ML/M2
LEFT VENTRICLE SYSTOLIC VOLUME: 29.1 ML
LEFT VENTRICULAR INTERNAL DIMENSION IN DIASTOLE: 4.23 CM (ref 3.5–6)
LEFT VENTRICULAR MASS: 127.41 G
LV LATERAL E/E' RATIO: 15.38 M/S
LV SEPTAL E/E' RATIO: 17.57 M/S
LVOT MG: 1.57 MMHG
LVOT MV: 0.6 CM/S
LYMPHOCYTES # BLD AUTO: 0.7 K/UL (ref 1–4.8)
LYMPHOCYTES NFR BLD: 6.6 % (ref 18–48)
MCH RBC QN AUTO: 30.1 PG (ref 27–31)
MCHC RBC AUTO-ENTMCNC: 32.8 G/DL (ref 32–36)
MCV RBC AUTO: 92 FL (ref 82–98)
MONOCYTES # BLD AUTO: 0.2 K/UL (ref 0.3–1)
MONOCYTES NFR BLD: 2.2 % (ref 4–15)
MV MEAN GRADIENT: 4 MMHG
MV PEAK A VEL: 0.9 M/S
MV PEAK E VEL: 1.23 M/S
MV PEAK GRADIENT: 7 MMHG
MV STENOSIS PRESSURE HALF TIME: 52.72 MS
MV VALVE AREA BY CONTINUITY EQUATION: 1.95 CM2
MV VALVE AREA P 1/2 METHOD: 4.17 CM2
NEUTROPHILS # BLD AUTO: 9.3 K/UL (ref 1.8–7.7)
NEUTROPHILS NFR BLD: 90.2 % (ref 38–73)
NRBC BLD-RTO: 0 /100 WBC
OHS LV EJECTION FRACTION SIMPSONS BIPLANE MOD: 52 %
PHOSPHATE SERPL-MCNC: 8.7 MG/DL (ref 2.7–4.5)
PISA MRMAX VEL: 5.48 M/S
PISA TR MAX VEL: 3.89 M/S
PLATELET # BLD AUTO: 248 K/UL (ref 150–450)
PMV BLD AUTO: 10.9 FL (ref 9.2–12.9)
POCT GLUCOSE: 304 MG/DL (ref 70–110)
POCT GLUCOSE: 360 MG/DL (ref 70–110)
POCT GLUCOSE: 362 MG/DL (ref 70–110)
POCT GLUCOSE: 412 MG/DL (ref 70–110)
POCT GLUCOSE: 418 MG/DL (ref 70–110)
POCT GLUCOSE: 426 MG/DL (ref 70–110)
POTASSIUM SERPL-SCNC: 3.4 MMOL/L (ref 3.5–5.1)
POTASSIUM SERPL-SCNC: 3.4 MMOL/L (ref 3.5–5.1)
PROT SERPL-MCNC: 6.5 G/DL (ref 6–8.4)
PTH-INTACT SERPL-MCNC: 524.5 PG/ML (ref 9–77)
PULM VEIN S/D RATIO: 3.04
PV MV: 0.71 M/S
PV PEAK D VEL: 0.23 M/S
PV PEAK GRADIENT: 3 MMHG
PV PEAK S VEL: 0.7 M/S
PV PEAK VELOCITY: 0.88 M/S
RA MAJOR: 4.13 CM
RA PRESSURE ESTIMATED: 8 MMHG
RA WIDTH: 4.1 CM
RBC # BLD AUTO: 2.86 M/UL (ref 4–5.4)
RIGHT VENTRICULAR END-DIASTOLIC DIMENSION: 2.99 CM
RV TB RVSP: 12 MMHG
RV TISSUE DOPPLER FREE WALL SYSTOLIC VELOCITY 1 (APICAL 4 CHAMBER VIEW): 15.15 CM/S
SINUS: 2.31 CM
SODIUM SERPL-SCNC: 132 MMOL/L (ref 136–145)
SODIUM SERPL-SCNC: 133 MMOL/L (ref 136–145)
STJ: 2.21 CM
TDI LATERAL: 0.08 M/S
TDI SEPTAL: 0.07 M/S
TDI: 0.08 M/S
TR MAX PG: 61 MMHG
TRICUSPID ANNULAR PLANE SYSTOLIC EXCURSION: 1.68 CM
TV REST PULMONARY ARTERY PRESSURE: 69 MMHG
WBC # BLD AUTO: 10.27 K/UL (ref 3.9–12.7)
Z-SCORE OF LEFT VENTRICULAR DIMENSION IN END DIASTOLE: -0.99
Z-SCORE OF LEFT VENTRICULAR DIMENSION IN END SYSTOLE: -0.31

## 2024-03-01 PROCEDURE — 63600175 PHARM REV CODE 636 W HCPCS: Performed by: HOSPITALIST

## 2024-03-01 PROCEDURE — 97116 GAIT TRAINING THERAPY: CPT

## 2024-03-01 PROCEDURE — 25000003 PHARM REV CODE 250: Performed by: REGISTERED NURSE

## 2024-03-01 PROCEDURE — 94761 N-INVAS EAR/PLS OXIMETRY MLT: CPT

## 2024-03-01 PROCEDURE — 11000001 HC ACUTE MED/SURG PRIVATE ROOM

## 2024-03-01 PROCEDURE — 80069 RENAL FUNCTION PANEL: CPT | Performed by: HOSPITALIST

## 2024-03-01 PROCEDURE — 25000003 PHARM REV CODE 250: Performed by: HOSPITALIST

## 2024-03-01 PROCEDURE — 97535 SELF CARE MNGMENT TRAINING: CPT

## 2024-03-01 PROCEDURE — 83970 ASSAY OF PARATHORMONE: CPT | Performed by: STUDENT IN AN ORGANIZED HEALTH CARE EDUCATION/TRAINING PROGRAM

## 2024-03-01 PROCEDURE — 25000003 PHARM REV CODE 250: Performed by: STUDENT IN AN ORGANIZED HEALTH CARE EDUCATION/TRAINING PROGRAM

## 2024-03-01 PROCEDURE — A4216 STERILE WATER/SALINE, 10 ML: HCPCS | Performed by: HOSPITALIST

## 2024-03-01 PROCEDURE — 85025 COMPLETE CBC W/AUTO DIFF WBC: CPT | Performed by: HOSPITALIST

## 2024-03-01 PROCEDURE — 82306 VITAMIN D 25 HYDROXY: CPT | Performed by: STUDENT IN AN ORGANIZED HEALTH CARE EDUCATION/TRAINING PROGRAM

## 2024-03-01 PROCEDURE — 97165 OT EVAL LOW COMPLEX 30 MIN: CPT

## 2024-03-01 PROCEDURE — 36415 COLL VENOUS BLD VENIPUNCTURE: CPT | Performed by: HOSPITALIST

## 2024-03-01 PROCEDURE — 97530 THERAPEUTIC ACTIVITIES: CPT

## 2024-03-01 PROCEDURE — 90945 DIALYSIS ONE EVALUATION: CPT

## 2024-03-01 PROCEDURE — 80053 COMPREHEN METABOLIC PANEL: CPT | Performed by: HOSPITALIST

## 2024-03-01 PROCEDURE — 97161 PT EVAL LOW COMPLEX 20 MIN: CPT

## 2024-03-01 RX ORDER — SEVELAMER CARBONATE 800 MG/1
1600 TABLET, FILM COATED ORAL
Status: DISCONTINUED | OUTPATIENT
Start: 2024-03-01 | End: 2024-03-02 | Stop reason: HOSPADM

## 2024-03-01 RX ORDER — POTASSIUM CHLORIDE 20 MEQ/1
40 TABLET, EXTENDED RELEASE ORAL ONCE
Status: COMPLETED | OUTPATIENT
Start: 2024-03-01 | End: 2024-03-01

## 2024-03-01 RX ORDER — ERGOCALCIFEROL 1.25 MG/1
50000 CAPSULE ORAL
Status: DISCONTINUED | OUTPATIENT
Start: 2024-03-02 | End: 2024-03-02 | Stop reason: HOSPADM

## 2024-03-01 RX ORDER — CARVEDILOL 25 MG/1
25 TABLET ORAL 2 TIMES DAILY
Status: DISCONTINUED | OUTPATIENT
Start: 2024-03-01 | End: 2024-03-02 | Stop reason: HOSPADM

## 2024-03-01 RX ORDER — PRAVASTATIN SODIUM 40 MG/1
40 TABLET ORAL DAILY
Status: DISCONTINUED | OUTPATIENT
Start: 2024-03-01 | End: 2024-03-02 | Stop reason: HOSPADM

## 2024-03-01 RX ORDER — FUROSEMIDE 10 MG/ML
40 INJECTION INTRAMUSCULAR; INTRAVENOUS ONCE
Status: COMPLETED | OUTPATIENT
Start: 2024-03-01 | End: 2024-03-01

## 2024-03-01 RX ORDER — VALSARTAN 160 MG/1
160 TABLET ORAL DAILY
Status: DISCONTINUED | OUTPATIENT
Start: 2024-03-01 | End: 2024-03-02 | Stop reason: HOSPADM

## 2024-03-01 RX ORDER — HYDRALAZINE HYDROCHLORIDE 25 MG/1
100 TABLET, FILM COATED ORAL 2 TIMES DAILY
Status: DISCONTINUED | OUTPATIENT
Start: 2024-03-01 | End: 2024-03-02 | Stop reason: HOSPADM

## 2024-03-01 RX ADMIN — Medication 10 ML: at 06:03

## 2024-03-01 RX ADMIN — INSULIN ASPART 4 UNITS: 100 INJECTION, SOLUTION INTRAVENOUS; SUBCUTANEOUS at 12:03

## 2024-03-01 RX ADMIN — Medication 10 ML: at 09:03

## 2024-03-01 RX ADMIN — ASPIRIN 81 MG CHEWABLE TABLET 81 MG: 81 TABLET CHEWABLE at 10:03

## 2024-03-01 RX ADMIN — CARVEDILOL 25 MG: 25 TABLET, FILM COATED ORAL at 08:03

## 2024-03-01 RX ADMIN — POTASSIUM CHLORIDE 40 MEQ: 1500 TABLET, EXTENDED RELEASE ORAL at 10:03

## 2024-03-01 RX ADMIN — CARVEDILOL 25 MG: 25 TABLET, FILM COATED ORAL at 10:03

## 2024-03-01 RX ADMIN — CHOLECALCIFEROL TAB 25 MCG (1000 UNIT) 1000 UNITS: 25 TAB at 10:03

## 2024-03-01 RX ADMIN — HYDRALAZINE HYDROCHLORIDE 100 MG: 25 TABLET, FILM COATED ORAL at 10:03

## 2024-03-01 RX ADMIN — PRAVASTATIN SODIUM 40 MG: 40 TABLET ORAL at 10:03

## 2024-03-01 RX ADMIN — Medication 10 ML: at 02:03

## 2024-03-01 RX ADMIN — SEVELAMER CARBONATE 1600 MG: 800 TABLET, FILM COATED ORAL at 05:03

## 2024-03-01 RX ADMIN — ACETAMINOPHEN 650 MG: 325 TABLET ORAL at 08:03

## 2024-03-01 RX ADMIN — CALCITRIOL CAPSULES 0.25 MCG 0.5 MCG: 0.25 CAPSULE ORAL at 10:03

## 2024-03-01 RX ADMIN — SODIUM BICARBONATE 650 MG TABLET 650 MG: at 10:03

## 2024-03-01 RX ADMIN — VALSARTAN 160 MG: 160 TABLET, FILM COATED ORAL at 12:03

## 2024-03-01 RX ADMIN — HYDRALAZINE HYDROCHLORIDE 100 MG: 25 TABLET, FILM COATED ORAL at 08:03

## 2024-03-01 RX ADMIN — INSULIN ASPART 5 UNITS: 100 INJECTION, SOLUTION INTRAVENOUS; SUBCUTANEOUS at 06:03

## 2024-03-01 RX ADMIN — HEPARIN SODIUM 5000 UNITS: 5000 INJECTION INTRAVENOUS; SUBCUTANEOUS at 09:03

## 2024-03-01 RX ADMIN — SODIUM BICARBONATE 650 MG TABLET 650 MG: at 08:03

## 2024-03-01 RX ADMIN — INSULIN ASPART 5 UNITS: 100 INJECTION, SOLUTION INTRAVENOUS; SUBCUTANEOUS at 05:03

## 2024-03-01 RX ADMIN — FUROSEMIDE 40 MG: 10 INJECTION, SOLUTION INTRAVENOUS at 10:03

## 2024-03-01 RX ADMIN — SODIUM BICARBONATE 650 MG TABLET 650 MG: at 03:03

## 2024-03-01 RX ADMIN — CEFTRIAXONE SODIUM 2 G: 2 INJECTION, POWDER, FOR SOLUTION INTRAMUSCULAR; INTRAVENOUS at 12:03

## 2024-03-01 RX ADMIN — SEVELAMER CARBONATE 1600 MG: 800 TABLET, FILM COATED ORAL at 12:03

## 2024-03-01 RX ADMIN — HEPARIN SODIUM 5000 UNITS: 5000 INJECTION INTRAVENOUS; SUBCUTANEOUS at 08:03

## 2024-03-01 RX ADMIN — Medication 100 MCG: at 10:03

## 2024-03-01 RX ADMIN — INSULIN ASPART 3 UNITS: 100 INJECTION, SOLUTION INTRAVENOUS; SUBCUTANEOUS at 10:03

## 2024-03-01 NOTE — PROGRESS NOTES
[facility]  Progress Note    Patient Name: Myesha Quinones  MRN: [id number]  Patient Class: IP- Inpatient   Admission Date: 2/29/2024  Length of Stay: 1 days  Attending Physician: Danish Reyes,*  Primary Care Provider: Dayton Michael MD        Subjective:     Principal Problem:Hypothermia        HPI:  Ms. Quinones is an 83-year-old woman with ESRD on PD, type 2 diabetes, and hypertension who presents with encephalopathy.  History obtained mostly via daughter at bedside.  She reports that over the past 2 days prior to admission her mother had reported some  generalized fatigue and discomfort but no focalizing symptoms. Reportedly was started on his intake and took 1st dose yesterday.  Daughter states that they normally speak to her on the phone in the morning but did not hear from her at 7:00 a.m. as usual.  Around 8:00 a.m. she had her  drive by to check on her and at that point she was found down prompting call to EMS.  Initially CABG was 65 and she was given glucose without response.    Overview/Hospital Course:  No notes on file    Interval History:  Much more alert today and appears to be back to her baseline mentation.  She reports having some ongoing shortness of breath when she gets up and walks but otherwise is doing well.  Denies any fevers or chills.  No focalizing symptoms including chest pain, cough, headache, abdominal pain, dysuria, rash.    Review of Systems  Objective:     Vital Signs (Most Recent):  Temp: 98.4 °F (36.9 °C) (03/01/24 1113)  Pulse: 88 (03/01/24 1113)  Resp: 20 (03/01/24 0749)  BP: (!) 155/88 (03/01/24 1113)  SpO2: 96 % (03/01/24 1113) Vital Signs (24h Range):  Temp:  [89.6 °F (32 °C)-98.4 °F (36.9 °C)] 98.4 °F (36.9 °C)  Pulse:  [71-90] 88  Resp:  [14-20] 20  SpO2:  [92 %-98 %] 96 %  BP: (130-182)/(60-88) 155/88     Weight: 64.4 kg (141 lb 15.6 oz)  Body mass index is 25.15 kg/m².    Intake/Output Summary (Last 24 hours) at 3/1/2024  1127  Last data filed at 3/1/2024 0747  Gross per 24 hour   Intake --   Output 272 ml   Net -272 ml         Physical Exam  Vitals reviewed.   Constitutional:       General: She is awake. She is not in acute distress.     Appearance: She is well-developed. She is ill-appearing. She is not diaphoretic.      Comments:   Frail   HENT:      Head: Normocephalic and atraumatic.      Nose: Nose normal.   Eyes:      General: No scleral icterus.     Pupils: Pupils are equal, round, and reactive to light.   Neck:      Vascular: No JVD.      Trachea: No tracheal deviation.   Cardiovascular:      Rate and Rhythm: Normal rate and regular rhythm.      Heart sounds: Normal heart sounds.   Pulmonary:      Effort: Pulmonary effort is normal. No respiratory distress.      Breath sounds: Normal breath sounds.   Abdominal:      General: There is no distension.      Palpations: Abdomen is soft.      Tenderness: There is no abdominal tenderness.      Comments:  PD catheter in place   Musculoskeletal:         General: No deformity.      Cervical back: Normal range of motion.   Skin:     General: Skin is warm and dry.      Findings: No rash.   Neurological:      Mental Status: She is alert and oriented to person, place, and time.   Psychiatric:         Behavior: Behavior normal.             Significant Labs: All pertinent labs within the past 24 hours have been reviewed.    Significant Imaging: I have reviewed all pertinent imaging results/findings within the past 24 hours.    Assessment/Plan:      * Hypothermia   -concern for possible infectious etiology  -Initiated on infectious workup in the ED; chest x-ray with edema, UA noninfectious, blood cultures collected; flu and COVID negative   -PD fluid not consistent with infection  - no focalizing symptoms  -Price Arredondo   -initiate on IV ceftriaxone, continue for now      Type 2 diabetes mellitus with hypoglycemia, with long-term current use of insulin  a -hypoglycemic on morning of  admission; suspect this may be secondary to a missed dialysis session  - have decreased home long-acting insulin dose and consulted Nephrology for dialysis  - improved now after administration of dextrose and is in fact becoming hyperglycemic  -adjust to detemir 5 units b.i.d.      Hypocalcemia  Patient has hypocalcemia due to ESRD related to vitamin D disorder which is currently uncontrolled, and has been confirmed with ionized and/or corrected calcium. Will replace calcium and monitor electrolytes closely. The latest calcium labs have been reviewed and are listed below.  Recent Labs   Lab 03/01/24  0606   CALCIUM 7.5*  7.5*         Recent Labs   Lab 02/29/24  1339   CAION 0.91*             Acute metabolic encephalopathy   -suspect this is secondary to infection versus uremia versus medication side effect  -TSH within normal limits; infectious workup as above   -appears to be improving with correction of hypothermia   -initiated on antibiotics  -Initially stroke activated in the ED; CT and CTA not consistent with stroke, unable to undergo MRI due to ICD placement but overall picture is much more consistent with metabolic process rather than CVA  -resolved      ESRD on peritoneal dialysis   -with volume overload   -nephrology consulted      Essential hypertension  Chronic, controlled. Latest blood pressure and vitals reviewed-     Temp:  [89.6 °F (32 °C)-98.4 °F (36.9 °C)]   Pulse:  [71-90]   Resp:  [14-20]   BP: (130-182)/(60-88)   SpO2:  [92 %-98 %] .   Home meds for hypertension were reviewed and noted below.   Hypertension Medications               carvediloL (COREG) 25 MG tablet TAKE 2 TABLETS (50 MG TOTAL) BY MOUTH 2 (TWO) TIMES DAILY.    furosemide (LASIX) 40 MG tablet Take 1 tablet (40 mg total) by mouth once daily.    hydrALAZINE (APRESOLINE) 100 MG tablet Take 1 tablet (100 mg total) by mouth 2 (two) times daily.    lisinopriL (PRINIVIL,ZESTRIL) 5 MG tablet Take 5 mg by mouth once daily.    nitroGLYCERIN  (NITROSTAT) 0.4 MG SL tablet Place 0.4 mg under the tongue every 5 (five) minutes as needed for Chest pain.     valsartan (DIOVAN) 160 MG tablet Take 160 mg by mouth once daily.            While in the hospital, will manage blood pressure as follows; Adjust home antihypertensive regimen as follows- resuming home antihypertensives    Will utilize p.r.n. blood pressure medication only if patient's blood pressure greater than 180/110 and she develops symptoms such as worsening chest pain or shortness of breath.    History of non anemic vitamin B12 deficiency   -resume B12      Hyponatremia  Patient has hyponatremia which is controlled,We will aim to correct the sodium by 4-6mEq in 24 hours. We will monitor sodium Daily. The hyponatremia is due to renal insufficiency. We will obtain the following studies: TSH, T4. We will treat the hyponatremia with Hemodialysis. The patient's sodium results have been reviewed and are listed below.  Recent Labs   Lab 03/01/24  0606   *  133*         Acute on chronic combined systolic and diastolic heart failure   -appears to have recovered EF; did have 45% EF in the past  -volume overloaded now in the setting ESRD   -peritoneal dialysis  -repeat TTE  -give IV lasix      Hyperlipidemia associated with type 2 diabetes mellitus   -resume home statin      Metabolic bone disease   -continue home calcitriol and bicarb  -continue vitamin D      Iron deficiency anemia  Patient's anemia is currently controlled. Has not received any PRBCs to date. Etiology likely d/t chronic disease due to Chronic Kidney Disease/ESRD  Current CBC reviewed-   Lab Results   Component Value Date    HGB 8.6 (L) 03/01/2024    HCT 26.2 (L) 03/01/2024     Monitor serial CBC and transfuse if patient becomes hemodynamically unstable, symptomatic or H/H drops below 7/21.    Biventricular ICD (implantable cardioverter-defibrillator) in place   -noted  -no evidence infection        VTE Risk Mitigation (From admission,  onward)           Ordered     heparin (porcine) injection 5,000 Units  Every 12 hours         02/29/24 1742     IP VTE HIGH RISK PATIENT  Once         02/29/24 1050     Place sequential compression device  Until discontinued         02/29/24 1050                    Discharge Planning   ALIX:      Code Status: Full Code   Is the patient medically ready for discharge?:     Reason for patient still in hospital (select all that apply): Patient trending condition  Discharge Plan A: Home with family, Home Health                  Danish Reyes MD  Department of Hospital Medicine   [facility]

## 2024-03-01 NOTE — PROGRESS NOTES
03/01/24 0747   Cycler Peritoneal Dialysis   Initial Drain Volume (mL) 640 mL   Effluent Appearance Clear   Number of Cycles 3   Total Volume (mL) 8000 mL   Cycler PD Total UF (mL) -128 mL   Cycler Treatment Comments tx complete, no complaints

## 2024-03-01 NOTE — ASSESSMENT & PLAN NOTE
-concern for possible infectious etiology  -Initiated on infectious workup in the ED; chest x-ray with edema, UA noninfectious, blood cultures collected; flu and COVID negative   -PD fluid not consistent with infection  - no focalizing symptoms  -Price Arredondo   -initiate on IV ceftriaxone, continue for now

## 2024-03-01 NOTE — PT/OT/SLP EVAL
Physical Therapy Evaluation    Patient Name:  Myesha Quinones   MRN:  [id number]    Recommendations:     Discharge Recommendations: Low Intensity Therapy   Discharge Equipment Recommendations: none   Barriers to discharge: None    Assessment:     Myesha Quinones is a 83 y.o. female admitted with a medical diagnosis of Hypothermia.  She presents with the following impairments/functional limitations: weakness, impaired endurance, impaired self care skills, impaired functional mobility, decreased coordination, impaired balance, gait instability, decreased lower extremity function, decreased safety awareness, impaired cardiopulmonary response to activity.    Pt demo good participation and motivation during PT evaluation.  Recommend cont acute therapy services to improve endurance and balance with functional mobility tasks.  Rec Low Intensity Therapy upon discharge to home with family support.  No DME needs at this time.               Rehab Prognosis: Good; patient would benefit from acute skilled PT services to address these deficits and reach maximum level of function.    Recent Surgery: * No surgery found *      Plan:     During this hospitalization, patient to be seen 5 x/week to address the identified rehab impairments via gait training, therapeutic activities, therapeutic exercises, neuromuscular re-education and progress toward the following goals:    Plan of Care Expires:  04/01/24    Subjective     Chief Complaint: reports no pain;  reports dizziness earlier while supine in bed but no dizziness since sitting in chair and none currently  Patient/Family Comments/goals: PLOF   return to fully independent  Pain/Comfort:  Pain Rating 1: 0/10  Pain Rating Post-Intervention 1: 0/10    Patients cultural, spiritual, Restorationism conflicts given the current situation: no    Living Environment:  Pt lives alone in [facility] with no TAMEKA using a WIS with shower chair.  Pt perf all ADL mod ind and ambulates household levels  using a rollator and ambulates in community with cane.  Pt is + .     Equipment used at home: wheelchair, walker, rolling, rollator, shower chair, bedside commode, cane, straight.  DME owned (not currently used): wheelchair.  Upon discharge, patient will have assistance from supportive family.    Objective:     Communicated with nsg prior to session.  Patient found up in chair with perineural catheter, telemetry  upon PT entry to room.    General Precautions: Standard, fall, other (see comments) ([place]  wears hearing aids)  Orthopedic Precautions:N/A   Braces: N/A  Respiratory Status: Room air    Exams:  Cog:  A & O x 4    pt reports lack of memory for incident that brought her to hospital but otherwise intact;  follows multi step commands  ROM:  BLE WFL  MMT:  BLE WFL  Pt demos increased SOB during/after gait training activities.    Functional Mobility:  Bed mobility:  reported SBA  Txfs sit<>stand from low surface chair with arm rest use CGA and VC technique and safe hand placement   Gait:  amb with RW CGA ~50' with Fair sandra and Good- balance with assistance needed during obstacle negotiation but good use of RW safely during change in direction and 180* turning with careful stepping;  amb with HHA min assist ~20' with fair+/Good- balance  - pt and daughter report that gait pattern is similar to home baseline but a little slower       AM-PAC 6 CLICK MOBILITY  Total Score:20       Treatment & Education:  Provided pt and DIL ed for PT POC, safety w mobility, fit and use of rollator vs RW appropriateness;  txf training, gait training with and without AD use; ed for seated therex AP, LAQ, hip flex to perf independently as able x10 reps 3-4 times during the day;  encouraged general mobility and use of joints throughout day whether open/close fists, biceps curls to AP and HS if in bed;  pt and DIL verbalized understanding for importance of mobility and to sit in chair as able to aid in recovery process    Patient  left up in chair with all lines intact, call button in reach, nsg notified, and DIL present.    GOALS:   Multidisciplinary Problems       Physical Therapy Goals          Problem: Physical Therapy    Goal Priority Disciplines Outcome Goal Variances Interventions   Physical Therapy Goal     PT, PT/OT Ongoing, Progressing     Description: Goals to be met by: discharge date     Patient will increase functional independence with mobility by performin. Sit to stand transfer with Modified [place]  2. Bed to chair transfer with Modified [place] using Rolling Walker  3. Gait  x 200 feet with Supervision using Rolling Walker.   4. Lower extremity exercise program x15 reps                          History:     Past Medical History:   Diagnosis Date    Acute encephalopathy 2024    Acute hypoxemic respiratory failure 2019    Acute right-sided thoracic back pain 2019    Allergy     Asthma     Basal cell carcinoma     left forehead    Basal cell carcinoma     left nose    Basal cell carcinoma 2015    right nose    Basal cell carcinoma 2015    left lower post neck    Basal cell carcinoma 2019    left ant scalp     BCC (basal cell carcinoma of skin) 10/20/2022    Right alar groove    Bilateral pleural effusion     Bilateral renal cysts     Breast cancer     CAD (coronary artery disease)     Cardiomyopathy     Cardiomyopathy, ischemic     Cataract     CHF (congestive heart failure)     CKD (chronic kidney disease) stage 4, GFR 15-29 ml/min     Colon polyp     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, with long-term current use of insulin 2018    COPD (chronic obstructive pulmonary disease)     COPD exacerbation 2018    Current mild episode of major depressive disorder without prior episode 2022    Defibrillator discharge     Dependence on renal dialysis 2023    Diabetes mellitus      Diabetes mellitus type II     Diabetes with neurologic complications     Edema     ESRD needing dialysis     Goiter     MNG    Hematuria, unspecified     HX: breast cancer     Hyperlipidemia     Hypertension     Hypocalcemia     Hypokalemia     Hyponatremia     Hyponatremia 04/02/2022    Iron deficiency anemia 05/16/2017    Iron deficiency anemia     Iron deficiency anemia     Left kidney mass     Meningioma     Microalbuminuria due to type 2 diabetes mellitus 01/26/2022    Osteoporosis, postmenopausal     Pneumonia 12/08/2019    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    Proteinuria 01/21/2019    Pseudomonas pneumonia     SCC (squamous cell carcinoma) 08/25/2022    right posterior neck    Skin cancer     s/p excision    Sleep apnea     CPAP    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/2015    mid forehead    Unspecified vitamin D deficiency     UTI (urinary tract infection) 04/02/2022    Ventricular tachycardia     Vitamin B12 deficiency     Vitamin D deficiency disease        Past Surgical History:   Procedure Laterality Date    BASAL CELL CARCINOMA EXCISION      posterior neck and nose    BREAST BIOPSY      BREAST CYST EXCISION Left     BREAST SURGERY      CARDIAC DEFIBRILLATOR PLACEMENT      x 2    CATARACT EXTRACTION W/  INTRAOCULAR LENS IMPLANT Bilateral     CHOLECYSTECTOMY      COLONOSCOPY N/A 11/5/2019    Procedure: COLONOSCOPY;  Surgeon: Baoz Botello MD;  Location: [address]);  Service: Endoscopy;  Laterality: N/A;  [facility] -     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    FRACTURE SURGERY      left elbow and wrist as a child    HYSTERECTOMY      INSERTION, CATHETER, DIALYSIS, PERITONEAL, LAPAROSCOPIC N/A 4/25/2023    Procedure: INSERTION, CATHETER, DIALYSIS, PERITONEAL, LAPAROSCOPIC;  Surgeon: Marcelo Isaac MD;  Location: [facility];  Service: General;  Laterality: N/A;    LAPAROSCOPIC LYSIS OF ADHESIONS N/A 4/25/2023    Procedure: LYSIS, ADHESIONS, LAPAROSCOPIC;  Surgeon: Marcelo DE LA ROSA  MD Marlin;  Location: [facility] OR;  Service: General;  Laterality: N/A;    MASTECTOMY Right     OMENTOPEXY, LAPAROSCOPIC N/A 4/25/2023    Procedure: OMENTOPEXY, LAPAROSCOPIC;  Surgeon: Marcelo Isaac MD;  Location: [facility] OR;  Service: General;  Laterality: N/A;    REPLACEMENT OF IMPLANTABLE CARDIOVERTER-DEFIBRILLATOR (ICD) GENERATOR N/A 12/17/2018    Procedure: REPLACEMENT, ICD GENERATOR;  Surgeon: Jan Mckeon MD;  Location: [facility] EP LAB;  Service: Cardiology;  Laterality: N/A;  DONNA, CRT-D gen change, MDT, MAC, SK, 3 Prep    REVISION OF SKIN POCKET FOR CARDIOVERTER-DEFIBRILLATOR  12/17/2018    Procedure: Revision, Skin Pocket, For Cardioverter-Defibrillator;  Surgeon: Jan Mckeon MD;  Location: [facility] EP LAB;  Service: Cardiology;;    SQUAMOUS CELL CARCINOMA EXCISION      remved from forehead    TONSILLECTOMY         Time Tracking:     PT Received On: 03/01/24  PT Start Time: 1136     PT Stop Time: 1200  PT Total Time (min): 24 min     Billable Minutes: Evaluation 12 and Gait Training 12      03/01/2024

## 2024-03-01 NOTE — SUBJECTIVE & OBJECTIVE
Interval History:  Much more alert today and appears to be back to her baseline mentation.  She reports having some ongoing shortness of breath when she gets up and walks but otherwise is doing well.  Denies any fevers or chills.  No focalizing symptoms including chest pain, cough, headache, abdominal pain, dysuria, rash.    Review of Systems  Objective:     Vital Signs (Most Recent):  Temp: 98.4 °F (36.9 °C) (03/01/24 1113)  Pulse: 88 (03/01/24 1113)  Resp: 20 (03/01/24 0749)  BP: (!) 155/88 (03/01/24 1113)  SpO2: 96 % (03/01/24 1113) Vital Signs (24h Range):  Temp:  [89.6 °F (32 °C)-98.4 °F (36.9 °C)] 98.4 °F (36.9 °C)  Pulse:  [71-90] 88  Resp:  [14-20] 20  SpO2:  [92 %-98 %] 96 %  BP: (130-182)/(60-88) 155/88     Weight: 64.4 kg (141 lb 15.6 oz)  Body mass index is 25.15 kg/m².    Intake/Output Summary (Last 24 hours) at 3/1/2024 1127  Last data filed at 3/1/2024 0747  Gross per 24 hour   Intake --   Output 272 ml   Net -272 ml         Physical Exam  Vitals reviewed.   Constitutional:       General: She is awake. She is not in acute distress.     Appearance: She is well-developed. She is ill-appearing. She is not diaphoretic.      Comments:   Frail   HENT:      Head: Normocephalic and atraumatic.      Nose: Nose normal.   Eyes:      General: No scleral icterus.     Pupils: Pupils are equal, round, and reactive to light.   Neck:      Vascular: No JVD.      Trachea: No tracheal deviation.   Cardiovascular:      Rate and Rhythm: Normal rate and regular rhythm.      Heart sounds: Normal heart sounds.   Pulmonary:      Effort: Pulmonary effort is normal. No respiratory distress.      Breath sounds: Normal breath sounds.   Abdominal:      General: There is no distension.      Palpations: Abdomen is soft.      Tenderness: There is no abdominal tenderness.      Comments:  PD catheter in place   Musculoskeletal:         General: No deformity.      Cervical back: Normal range of motion.   Skin:     General: Skin is warm  and dry.      Findings: No rash.   Neurological:      Mental Status: She is alert and oriented to person, place, and time.   Psychiatric:         Behavior: Behavior normal.             Significant Labs: All pertinent labs within the past 24 hours have been reviewed.    Significant Imaging: I have reviewed all pertinent imaging results/findings within the past 24 hours.

## 2024-03-01 NOTE — ASSESSMENT & PLAN NOTE
Chronic, controlled. Latest blood pressure and vitals reviewed-     Temp:  [89.6 °F (32 °C)-98.4 °F (36.9 °C)]   Pulse:  [71-90]   Resp:  [14-20]   BP: (130-182)/(60-88)   SpO2:  [92 %-98 %] .   Home meds for hypertension were reviewed and noted below.   Hypertension Medications               carvediloL (COREG) 25 MG tablet TAKE 2 TABLETS (50 MG TOTAL) BY MOUTH 2 (TWO) TIMES DAILY.    furosemide (LASIX) 40 MG tablet Take 1 tablet (40 mg total) by mouth once daily.    hydrALAZINE (APRESOLINE) 100 MG tablet Take 1 tablet (100 mg total) by mouth 2 (two) times daily.    lisinopriL (PRINIVIL,ZESTRIL) 5 MG tablet Take 5 mg by mouth once daily.    nitroGLYCERIN (NITROSTAT) 0.4 MG SL tablet Place 0.4 mg under the tongue every 5 (five) minutes as needed for Chest pain.     valsartan (DIOVAN) 160 MG tablet Take 160 mg by mouth once daily.            While in the hospital, will manage blood pressure as follows; Adjust home antihypertensive regimen as follows- resuming home antihypertensives    Will utilize p.r.n. blood pressure medication only if patient's blood pressure greater than 180/110 and she develops symptoms such as worsening chest pain or shortness of breath.

## 2024-03-01 NOTE — ASSESSMENT & PLAN NOTE
Patient has hyponatremia which is controlled,We will aim to correct the sodium by 4-6mEq in 24 hours. We will monitor sodium Daily. The hyponatremia is due to renal insufficiency. We will obtain the following studies: TSH, T4. We will treat the hyponatremia with Hemodialysis. The patient's sodium results have been reviewed and are listed below.  Recent Labs   Lab 03/01/24  0606   *  133*

## 2024-03-01 NOTE — ASSESSMENT & PLAN NOTE
Patient's anemia is currently controlled. Has not received any PRBCs to date. Etiology likely d/t chronic disease due to Chronic Kidney Disease/ESRD  Current CBC reviewed-   Lab Results   Component Value Date    HGB 8.6 (L) 03/01/2024    HCT 26.2 (L) 03/01/2024     Monitor serial CBC and transfuse if patient becomes hemodynamically unstable, symptomatic or H/H drops below 7/21.

## 2024-03-01 NOTE — PT/OT/SLP EVAL
"Occupational Therapy   Evaluation/Treatment     Name: Myesha Quinones  MRN: [id number]  Admitting Diagnosis: <principal problem not specified>  Recent Surgery: * No surgery found *      Recommendations:     Discharge Recommendations: Low Intensity Therapy  Discharge Equipment Recommendations:  none  Barriers to discharge:  None    Assessment:     Myesha Quinones is a 83 y.o. female with a medical diagnosis of <principal problem not specified>.  She presents with The primary encounter diagnosis was Hypocalcemia. Diagnoses of Acute focal neurological deficit, AMS (altered mental status), Dehydration, and CHF (congestive heart failure) were also pertinent to this visit.  . Performance deficits affecting function: weakness, impaired endurance, impaired self care skills, impaired functional mobility, gait instability, impaired balance, impaired cardiopulmonary response to activity, edema.      Pt would benefit from cont OT services in order to maximize functional independence. Recommending low intensity therapy at d/c. Pt owns all recommended DME. Family reports assist will be able to be provided for 24/7 care at time of d/c to ensure safe recovery        Rehab Prognosis: Good; patient would benefit from acute skilled OT services to address these deficits and reach maximum level of function.       Plan:     Patient to be seen 3 x/week to address the above listed problems via self-care/home management, therapeutic activities, therapeutic exercises  Plan of Care Expires: 04/01/24  Plan of Care Reviewed with: patient, daughter    Subjective     Chief Complaint: pt reporting, ' I'm not hurting. I'm just out of breath" also states shaking/tremulous since "episode" has happened  Patient/Family Comments/goals: return to PLOF     Occupational Profile:  Living Environment: alone, SSH, no steps to enter, WIS   Previous level of function: pt was mod I/independent with use of rollator in home, SBQC in community, shower chair " for bathing; able to heat up meals brought by [employer] or cook simple stove top meals; does receive meals on wheels 1x/day   Equipment Used at Home: rollator, walker, rolling, shower chair, wheelchair, bedside commode, cane, quad  Assistance upon Discharge: from family     Pain/Comfort:  Pain Rating 1: 0/10    Patients cultural, spiritual, Yazidism conflicts given the current situation:      Objective:     Communicated with: nsg prior to session.  Patient found supine with   upon OT entry to room.    General Precautions: Standard, fall  Orthopedic Precautions: N/A  Braces: N/A  Respiratory Status: Room air    Occupational Performance:    Bed Mobility:    Patient completed Scooting/Bridging with supervision  Patient completed Supine to Sit with supervision      Functional Mobility/Transfers:  Patient completed Sit <> Stand Transfer with contact guard assistance and minimum assistance  with  rolling walker   Patient completed Bed <> Chair Transfer using Step Transfer technique with contact guard assistance with rolling walker  Patient completed Toilet Transfer Step Transfer technique with minimum assistance with  rolling walker  Functional Mobility: CGA with RW; slightly tremulous throughout session     Activities of Daily Living:  Grooming: stand by assistance /CGa at sink   Lower Body Dressing: minimum assistance doff brief; elva underwear   Toileting: contact guard assistance simulated on toilet     Cognitive/Visual Perceptual:  WFL   [place]; reads lips     Physical Exam:  Balance:    -       WFL seated ; fair to fair + standing    Postural examination/scapula alignment:    -       Rounded shoulders  -       Forward head  Skin integrity: Bruising of BUEs   Edema:  Mild BUEs   Dominant hand:    -       right  Upper Extremity Range of Motion:   BUE WFL for pt's needs   Upper Extremity Strength:  BUE grossly WFL for pt's needs    Strength:  WFL   Fine Motor Coordination: intact       AMPA 6 Click ADL:  AMPAC Total  Score: 19    Treatment & Education:  Pt found supine; agreeable to treatment   Bed mobility as above   Pt initially with increased shaking /tremors; requires time and education on PLB 2/2 demo'ing SOB   Able to perform functional mobility in room x 2 trials with RW; seated rest break between   T/f to toilet for simulated toileting   Stood at sink for hand hygiene   Seated break in b/s chair   Able to stand 2 subsequent trials from b/s chair during LBD (donning underwear )     Patient left up in chair with all lines intact, call button in reach, nsg notified, and family present    GOALS:   Multidisciplinary Problems       Occupational Therapy Goals          Problem: Occupational Therapy    Goal Priority Disciplines Outcome Interventions   Occupational Therapy Goal     OT, PT/OT Ongoing, Progressing    Description: Goals to be met by: 4/1/24     Patient will increase functional independence with ADLs by performing:    LE Dressing with Modified [place].  Grooming while standing with Modified [place].  Toileting from toilet with Modified [place] for hygiene and clothing management.   Supine to sit with Modified [place].  Step transfer with Modified [place]  Toilet transfer to toilet with Modified [place].  Increased functional strength to WFL for self care skills and functional mobility .  Upper extremity exercise program x10 reps per handout, with independence.                         History:     Past Medical History:   Diagnosis Date    Acute encephalopathy 2/29/2024    Acute hypoxemic respiratory failure 12/19/2019    Acute right-sided thoracic back pain 12/09/2019    Allergy     Asthma     Basal cell carcinoma     left forehead    Basal cell carcinoma     left nose    Basal cell carcinoma 05/20/2015    right nose    Basal cell carcinoma 12/22/2015    left lower post neck    Basal cell carcinoma 12/03/2019    left ant scalp     BCC (basal cell carcinoma of skin) 10/20/2022    Right alar  groove    Bilateral pleural effusion     Bilateral renal cysts     Breast cancer     CAD (coronary artery disease)     Cardiomyopathy     Cardiomyopathy, ischemic     Cataract     CHF (congestive heart failure)     CKD (chronic kidney disease) stage 4, GFR 15-29 ml/min     Colon polyp 2011    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, with long-term current use of insulin 02/22/2018    COPD (chronic obstructive pulmonary disease)     COPD exacerbation 04/08/2018    Current mild episode of major depressive disorder without prior episode 01/25/2022    Defibrillator discharge     Dependence on renal dialysis 08/22/2023    Diabetes mellitus     Diabetes mellitus type II     Diabetes with neurologic complications     Edema     ESRD needing dialysis     Goiter     MNG    Hematuria, unspecified     HX: breast cancer     Hyperlipidemia     Hypertension     Hypocalcemia     Hypokalemia     Hyponatremia     Hyponatremia 04/02/2022    Iron deficiency anemia 05/16/2017    Iron deficiency anemia     Iron deficiency anemia     Left kidney mass     Meningioma     Microalbuminuria due to type 2 diabetes mellitus 01/26/2022    Osteoporosis, postmenopausal     Pneumonia 12/08/2019    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    Proteinuria 01/21/2019    Pseudomonas pneumonia     SCC (squamous cell carcinoma) 08/25/2022    right posterior neck    Skin cancer     s/p excision    Sleep apnea     CPAP    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/2015    mid forehead    Unspecified vitamin D deficiency     UTI (urinary tract infection) 04/02/2022    Ventricular tachycardia     Vitamin B12 deficiency     Vitamin D deficiency disease          Past Surgical History:   Procedure Laterality Date    BASAL CELL CARCINOMA EXCISION      posterior neck and nose    BREAST BIOPSY      BREAST CYST EXCISION Left     BREAST SURGERY      CARDIAC DEFIBRILLATOR PLACEMENT      x 2    CATARACT  EXTRACTION W/  INTRAOCULAR LENS IMPLANT Bilateral     CHOLECYSTECTOMY      COLONOSCOPY N/A 11/5/2019    Procedure: COLONOSCOPY;  Surgeon: Boaz Botello MD;  Location: [facility] ENDO ([address] [place] [address]);  Service: Endoscopy;  Laterality: N/A;  AICD - [employer]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    FRACTURE SURGERY      left elbow and wrist as a child    HYSTERECTOMY      INSERTION, CATHETER, DIALYSIS, PERITONEAL, LAPAROSCOPIC N/A 4/25/2023    Procedure: INSERTION, CATHETER, DIALYSIS, PERITONEAL, LAPAROSCOPIC;  Surgeon: Marcelo Isaac MD;  Location: [facility] OR;  Service: General;  Laterality: N/A;    LAPAROSCOPIC LYSIS OF ADHESIONS N/A 4/25/2023    Procedure: LYSIS, ADHESIONS, LAPAROSCOPIC;  Surgeon: Marcelo Isaac MD;  Location: [facility] OR;  Service: General;  Laterality: N/A;    MASTECTOMY Right     OMENTOPEXY, LAPAROSCOPIC N/A 4/25/2023    Procedure: OMENTOPEXY, LAPAROSCOPIC;  Surgeon: Marcelo Isaac MD;  Location: [facility] OR;  Service: General;  Laterality: N/A;    REPLACEMENT OF IMPLANTABLE CARDIOVERTER-DEFIBRILLATOR (ICD) GENERATOR N/A 12/17/2018    Procedure: REPLACEMENT, ICD GENERATOR;  Surgeon: Jan Mckeon MD;  Location: [facility] EP LAB;  Service: Cardiology;  Laterality: N/A;  DONNA, CRT-D gen MD sulemaT, MAC, SK, 3 Prep    REVISION OF SKIN POCKET FOR CARDIOVERTER-DEFIBRILLATOR  12/17/2018    Procedure: Revision, Skin Pocket, For Cardioverter-Defibrillator;  Surgeon: Jan Mckeon MD;  Location: [facility] EP LAB;  Service: Cardiology;;    SQUAMOUS CELL CARCINOMA EXCISION      remved from forehead    TONSILLECTOMY         Time Tracking:     OT Date of Treatment: 03/01/24  OT Start Time: 0949  OT Stop Time: 1015  OT Total Time (min): 26 min    Billable Minutes:Evaluation 8  Self Care/Home Management 10  Therapeutic Activity 8    3/1/2024

## 2024-03-01 NOTE — ASSESSMENT & PLAN NOTE
a -hypoglycemic on morning of admission; suspect this may be secondary to a missed dialysis session  - have decreased home long-acting insulin dose and consulted Nephrology for dialysis  - improved now after administration of dextrose and is in fact becoming hyperglycemic  -adjust to detemir 5 units b.i.d.

## 2024-03-01 NOTE — PLAN OF CARE
Problem: Physical Therapy  Goal: Physical Therapy Goal  Description: Goals to be met by: discharge date     Patient will increase functional independence with mobility by performin. Sit to stand transfer with Modified [place]  2. Bed to chair transfer with Modified [place] using Rolling Walker  3. Gait  x 200 feet with Supervision using Rolling Walker.   4. Lower extremity exercise program x15 reps     Outcome: Ongoing, Progressing     Pt demo good participation and motivation during PT evaluation.  Recommend cont acute therapy services to improve endurance and balance with functional mobility tasks.  Rec Low Intensity Therapy upon discharge to home with family support.  No DME needs at this time.

## 2024-03-01 NOTE — ASSESSMENT & PLAN NOTE
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 Will replace calcium and monitor electrolytes closely. The latest calcium labs have been reviewed and are listed below.  Recent Labs   Lab 03/01/24  0606   CALCIUM 7.5*  7.5*         Recent Labs   Lab 02/29/24  1339   CAION 0.91*

## 2024-03-01 NOTE — PLAN OF CARE
AAOx4. Medications administered per MAR. Tele monitored. Glucose monitored. Safety precautions maintained. Call bell within reach.

## 2024-03-01 NOTE — PLAN OF CARE
SW met with pt, pts daughter, and son in law at bedside to discuss dc planning. All information confirmed on chart. Pt resides in home alone. Pt is independent in ADLs. Pt has no hh services at home. Pt has wheelchair, rw, rollator, and cane at home. Pt usues cane to get around. Pts family will provide transport home. Pt administers her own PD at home. Pts [facility] D[facility]. SW sent HH referral via [facility]. SW will continue to follow pt throughout her transitions of care and assist with any dc needs.     Pt is hard of hearing. Pts daughter assisted with answering questions.     SW requested sooner PCP follow up.     Future Appointments   Date Time Provider Department Center   3/14/2024  9:30 AM CARRIE Arechiga MD [facility] OPHTHA [place]   4/10/2024  3:30 PM Kelvin Maki MD [facility] PULMSVC [facility]   4/17/2024  9:00 AM Eric Brooke PA-C [facility] UROGYN [facility]   4/24/2024 10:40 AM Dayton Michael MD [facility] FAM MED [facility]   6/5/2024 12:15 PM EKG, APPT [facility] EKG [facility]   6/5/2024 12:40 PM COORDINATED DEVICE CHECK [facility] ARRHPRO [facility]   6/5/2024  1:30 PM Celeste Rogers NP [facility] ARRHYTH [facility]        03/01/24 0937   Discharge Assessment   Assessment Type Discharge Planning Assessment   Confirmed/corrected address, phone number and insurance Yes   Confirmed Demographics Correct on Facesheet   Source of Information family   Communicated ALIX with patient/caregiver Yes   People in Home alone   Do you expect to return to your current living situation? Yes   Do you have help at home or someone to help you manage your care at home? Yes   Who are your caregiver(s) and their phone number(s)? Betsy Bowen (Daughter) [phone]   Prior to hospitilization cognitive status: Alert/Oriented   Current cognitive status: Alert/Oriented   Home Layout Able to live on 1st floor   Equipment Currently Used at Home wheelchair;rollator;walker, rolling;cane, quad   Patient currently being  followed by outpatient case management? No   Do you currently have service(s) that help you manage your care at home? No   Do you take prescription medications? Yes   Do you have prescription coverage? Yes   Coverage [employer] MANAGED MEDICARE - [employer] MEDICARE HMO -   Do you have any problems affording any of your prescribed medications? No   Is the patient taking medications as prescribed? yes   Who is going to help you get home at discharge? Betsy Bowen (Daughter) [phone]   How do you get to doctors appointments? family or friend will provide;car, drives self   Are you on dialysis? Yes   Dialysis Name and Scheduled days PD ([facility]   Do you take coumadin? No   Discharge Plan A Home with family;Home Health   DME Needed Upon Discharge  none   Discharge Plan discussed with: Patient;Adult children   Transition of Care Barriers None   Alcohol Use   Q1: How often do you have a drink containing alcohol? Never   Q2: How many drinks containing alcohol do you have on a typical day when you are drinking? None   Q3: How often do you have six or more drinks on one occasion? Never

## 2024-03-01 NOTE — PLAN OF CARE
Problem: Occupational Therapy  Goal: Occupational Therapy Goal  Description: Goals to be met by: 4/1/24     Patient will increase functional independence with ADLs by performing:    LE Dressing with Modified [place].  Grooming while standing with Modified [place].  Toileting from toilet with Modified [place] for hygiene and clothing management.   Supine to sit with Modified [place].  Step transfer with Modified [place]  Toilet transfer to toilet with Modified [place].  Increased functional strength to WFL for self care skills and functional mobility .  Upper extremity exercise program x10 reps per handout, with independence.    Outcome: Ongoing, Progressing       Pt would benefit from cont OT services in order to maximize functional independence. Recommending low intensity therapy at d/c. Pt owns all recommended DME. Family reports assist will be able to be provided for 24/7 care at time of d/c to ensure safe recovery

## 2024-03-01 NOTE — ASSESSMENT & PLAN NOTE
-suspect this is secondary to infection versus uremia versus medication side effect  -TSH within normal limits; infectious workup as above   -appears to be improving with correction of hypothermia   -initiated on antibiotics  -Initially stroke activated in the ED; CT and CTA not consistent with stroke, unable to undergo MRI due to ICD placement but overall picture is much more consistent with metabolic process rather than CVA  -resolved

## 2024-03-01 NOTE — PROGRESS NOTES
Nephrology Progress Note       Consult Requested By: Danish Reyes,*  Reason for Consult: ESRD on PD      SUBJECTIVE:         Review of Systems   Constitutional:  Positive for malaise/fatigue. Negative for chills and fever.   HENT:  Negative for congestion and sore throat.    Eyes:  Negative for blurred vision, double vision and photophobia.   Respiratory:  Negative for cough and shortness of breath.    Cardiovascular:  Negative for chest pain, palpitations and leg swelling.   Gastrointestinal:  Negative for abdominal pain, diarrhea, nausea and vomiting.   Genitourinary:  Negative for dysuria and urgency.   Musculoskeletal:  Negative for joint pain and myalgias.   Skin:  Negative for itching and rash.   Neurological:  Positive for weakness. Negative for dizziness, sensory change and headaches.   Endo/Heme/Allergies:  Negative for polydipsia. Does not bruise/bleed easily.   Psychiatric/Behavioral:  Negative for depression.        Past Medical History:   Diagnosis Date    Acute encephalopathy 2/29/2024    Acute hypoxemic respiratory failure 12/19/2019    Acute right-sided thoracic back pain 12/09/2019    Allergy     Asthma     Basal cell carcinoma     left forehead    Basal cell carcinoma     left nose    Basal cell carcinoma 05/20/2015    right nose    Basal cell carcinoma 12/22/2015    left lower post neck    Basal cell carcinoma 12/03/2019    left ant scalp     BCC (basal cell carcinoma of skin) 10/20/2022    Right alar groove    Bilateral pleural effusion     Bilateral renal cysts     Breast cancer     CAD (coronary artery disease)     Cardiomyopathy     Cardiomyopathy, ischemic     Cataract     CHF (congestive heart failure)     CKD (chronic kidney disease) stage 4, GFR 15-29 ml/min     Colon polyp 2011    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, with long-term current use of insulin 02/22/2018    COPD (chronic obstructive  pulmonary disease)     COPD exacerbation 04/08/2018    Current mild episode of major depressive disorder without prior episode 01/25/2022    Defibrillator discharge     Dependence on renal dialysis 08/22/2023    Diabetes mellitus     Diabetes mellitus type II     Diabetes with neurologic complications     Edema     ESRD needing dialysis     Goiter     MNG    Hematuria, unspecified     HX: breast cancer     Hyperlipidemia     Hypertension     Hypocalcemia     Hypokalemia     Hyponatremia     Hyponatremia 04/02/2022    Iron deficiency anemia 05/16/2017    Iron deficiency anemia     Iron deficiency anemia     Left kidney mass     Meningioma     Microalbuminuria due to type 2 diabetes mellitus 01/26/2022    Osteoporosis, postmenopausal     Pneumonia 12/08/2019    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    Proteinuria 01/21/2019    Pseudomonas pneumonia     SCC (squamous cell carcinoma) 08/25/2022    right posterior neck    Skin cancer     s/p excision    Sleep apnea     CPAP    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/2015    mid forehead    Unspecified vitamin D deficiency     UTI (urinary tract infection) 04/02/2022    Ventricular tachycardia     Vitamin B12 deficiency     Vitamin D deficiency disease          OBJECTIVE:     Vital Signs (Most Recent)  Vitals:    03/01/24 0539 03/01/24 0749 03/01/24 1015 03/01/24 1113   BP:  (!) 165/69  (!) 155/88   BP Location:  Right arm  Right arm   Patient Position:  Lying  Lying   Pulse: 90 87  88   Resp:  20     Temp:  97.6 °F (36.4 °C)  98.4 °F (36.9 °C)   TempSrc:  Oral  Oral   SpO2:  95% (!) 94% 96%   Weight:       Height:             Date 03/01/24 0700 - 03/02/24 0659   Shift 7875-6497 7940-2568 9511-0781 24 Hour Total   INTAKE   Shift Total(mL/kg)       OUTPUT   Other -128   -128   Shift Total(mL/kg) -128(-2)   -128(-2)   Weight (kg) 64.4 64.4 64.4 64.4           Medications:   aspirin  81 mg Oral Daily    calcitRIOL  0.5 mcg Oral Daily    carvediloL  25 mg Oral BID     cefTRIAXone (Rocephin) IV (PEDS and ADULTS)  2 g Intravenous Q24H    cyanocobalamin  100 mcg Oral Daily    heparin (porcine)  5,000 Units Subcutaneous Q12H    hydrALAZINE  100 mg Oral BID    insulin detemir U-100  5 Units Subcutaneous QHS    polyethylene glycol  17 g Oral Daily    pravastatin  40 mg Oral Daily    sevelamer carbonate  1,600 mg Oral TID WM    sodium bicarbonate  650 mg Oral TID    sodium chloride 0.9%  10 mL Intravenous Q8H    vitamin D  1,000 Units Oral Daily           Physical Exam  Vitals and nursing note reviewed.   Constitutional:       General: She is not in acute distress.     Appearance: She is not diaphoretic.      Comments: Frail    HENT:      Head: Normocephalic and atraumatic.      Mouth/Throat:      Pharynx: No oropharyngeal exudate.   Eyes:      General: No scleral icterus.     Conjunctiva/sclera: Conjunctivae normal.      Pupils: Pupils are equal, round, and reactive to light.   Cardiovascular:      Rate and Rhythm: Normal rate and regular rhythm.      Heart sounds: Normal heart sounds. No murmur heard.  Pulmonary:      Effort: Pulmonary effort is normal. No respiratory distress.      Breath sounds: Normal breath sounds.   Abdominal:      General: Bowel sounds are normal. There is no distension.      Palpations: Abdomen is soft.      Tenderness: There is no abdominal tenderness.      Comments: PD catheter site no drainage no TTP    Musculoskeletal:         General: Normal range of motion.      Cervical back: Normal range of motion and neck supple.   Skin:     General: Skin is warm and dry.      Findings: No erythema.   Neurological:      Mental Status: She is alert and oriented to person, place, and time.      Cranial Nerves: No cranial nerve deficit.      Motor: Weakness present.   Psychiatric:         Mood and Affect: Affect normal.         Cognition and Memory: Memory normal.         Judgment: Judgment normal.         Laboratory:  Recent Labs   Lab 02/29/24  0946 03/01/24  0606   WBC  8.45 10.27   HGB 8.5* 8.6*   HCT 25.4* 26.2*    248   MONO 4.9  0.4 2.2*  0.2*   EOSINOPHIL 1.2 0.0       Recent Labs   Lab 02/29/24  0946 03/01/24  0606   * 132*  133*   K 3.6 3.4*  3.4*    101  101   CO2 12* 12*  12*   * 89*  89*   CREATININE 5.1* 4.7*  4.7*   CALCIUM 6.6* 7.5*  7.5*   PHOS  --  8.7*         Diagnostic Results:  X-Ray: Reviewed  US: Reviewed  Echo: Reviewed  ASSESSMENT/PLAN:     1. 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   Was AMS till this AM now AAOx3 Temp still low less likely Uremia   -- 2/29  PD fluid culture and Cell count - no infection,   -- PD  with 1.5% 2L dwell  3 Cycles over 10 hr BUN  on high side. Now better but  volume up absorbed 128cc so change to 2.5% and cut back on time dwell to 2hr and do 4 cycles adjust Insulin sliding scale as needed for BS    -- Give lasix 40 IV   -- Daily Renal Function Panel  -- Avoid Hypotension.  -- Renally dose all meds  -- Please avoid nephrotoxins, including NSAIDs, aminoglycosides, IV contrast (unless absolutely necessary), gadolinium, fleets and other phosphorous-based laxatives. Caution with antibiotics.    Hypothermia AMS - better per Neuro not Stroke   -- less likely infection ? PNA on abx already   -- Already on IV abx no Abd pain -   PD fluid culture - pending  cell count - at range   -- Less likely Uremia   -- No Liver disease     2. HTN   - controlled   3. Anemia of chronic kidney disease treated with MIGUEL   EPogen    monthly   Recent Labs   Lab 02/29/24  0946 03/01/24  0606   HGB 8.5* 8.6*   HCT 25.4* 26.2*    248         Iron   Lab Results   Component Value Date    IRON 124 05/09/2023    TIBC 355 05/09/2023    FERRITIN 639 (H) 05/19/2023       4. MBD (E88.9 M90.80) - Hypocalcemia   --  PTH vit D   -- Replace Ca+ IV for now   Recent Labs   Lab 02/29/24  1339 03/01/24  0606   PTH  --  524.5*   CALCIUM  --  7.5*  7.5*   CAION 0.91*  --    PHOS  --  8.7*      -- Sevelamer 1600 TIDWM   Recent Labs   Lab 02/29/24  0946   MG 1.8         Lab Results   Component Value Date    .5 (H) 03/01/2024    CALCIUM 7.5 (L) 03/01/2024    CALCIUM 7.5 (L) 03/01/2024    CAION 0.91 (L) 02/29/2024    PHOS 8.7 (H) 03/01/2024     Lab Results   Component Value Date    UQYELGWT74EL 32 09/16/2022     Acidosis   -- PO Bicarbonate 650 TID     Lab Results   Component Value Date    CO2 12 (L) 03/01/2024    CO2 12 (L) 03/01/2024       5. Nutrition/Hypoalbuminemia    Recent Labs   Lab 02/29/24  0946 03/01/24  0606   LABPROT 12.8*  --    ALBUMIN 2.6* 2.6*  2.5*       Nepro with meals TID.       Thank you for allowing me to participate in care of your patient  With any question please call   Anna Fairbanks MD     [facility]  ESTRELLA Morocho MD,   MD TAMIKA Isabel MD E. V. Harmon, NP  [address]   [place], [address] [phone]  After hours answering service: [phone]

## 2024-03-01 NOTE — PLAN OF CARE
Problem: Adult Inpatient Plan of Care  Goal: Plan of Care Review  Outcome: Ongoing, Progressing  Goal: Absence of Hospital-Acquired Illness or Injury  Outcome: Ongoing, Progressing  Goal: Optimal Comfort and Wellbeing  Outcome: Ongoing, Progressing  Goal: Readiness for Transition of Care  Outcome: Ongoing, Progressing     Problem: Oral Intake Inadequate (Chronic Kidney Disease)  Goal: Optimal Oral Intake  Outcome: Ongoing, Progressing     Problem: Pain (Chronic Kidney Disease)  Goal: Acceptable Pain Control  Outcome: Ongoing, Progressing     Problem: Hypertension Comorbidity  Goal: Blood Pressure in Desired Range  Outcome: Ongoing, Progressing

## 2024-03-01 NOTE — ASSESSMENT & PLAN NOTE
-appears to have recovered EF; did have 45% EF in the past  -volume overloaded now in the setting ESRD   -peritoneal dialysis  -repeat TTE  -give IV lasix

## 2024-03-02 VITALS
RESPIRATION RATE: 20 BRPM | SYSTOLIC BLOOD PRESSURE: 153 MMHG | HEART RATE: 67 BPM | TEMPERATURE: 97 F | WEIGHT: 140.44 LBS | HEIGHT: 63 IN | OXYGEN SATURATION: 95 % | BODY MASS INDEX: 24.88 KG/M2 | DIASTOLIC BLOOD PRESSURE: 67 MMHG

## 2024-03-02 LAB
ALBUMIN SERPL BCP-MCNC: 2.5 G/DL (ref 3.5–5.2)
AMYLASE FLD-CCNC: 7 U/L
ANION GAP SERPL CALC-SCNC: 19 MMOL/L (ref 8–16)
BASOPHILS # BLD AUTO: 0.01 K/UL (ref 0–0.2)
BASOPHILS NFR BLD: 0.1 % (ref 0–1.9)
BUN SERPL-MCNC: 87 MG/DL (ref 8–23)
CALCIUM SERPL-MCNC: 7.1 MG/DL (ref 8.7–10.5)
CHLORIDE SERPL-SCNC: 99 MMOL/L (ref 95–110)
CO2 SERPL-SCNC: 15 MMOL/L (ref 23–29)
CREAT SERPL-MCNC: 4.4 MG/DL (ref 0.5–1.4)
DIFFERENTIAL METHOD BLD: ABNORMAL
EOSINOPHIL # BLD AUTO: 0 K/UL (ref 0–0.5)
EOSINOPHIL NFR BLD: 0 % (ref 0–8)
ERYTHROCYTE [DISTWIDTH] IN BLOOD BY AUTOMATED COUNT: 13.6 % (ref 11.5–14.5)
EST. GFR  (NO RACE VARIABLE): 9 ML/MIN/1.73 M^2
GLUCOSE SERPL-MCNC: 281 MG/DL (ref 70–110)
HCT VFR BLD AUTO: 22.9 % (ref 37–48.5)
HGB BLD-MCNC: 7.7 G/DL (ref 12–16)
IMM GRANULOCYTES # BLD AUTO: 0.1 K/UL (ref 0–0.04)
IMM GRANULOCYTES NFR BLD AUTO: 0.9 % (ref 0–0.5)
LIPASE FLD-CCNC: 5 U/L
LYMPHOCYTES # BLD AUTO: 0.9 K/UL (ref 1–4.8)
LYMPHOCYTES NFR BLD: 8.2 % (ref 18–48)
MCH RBC QN AUTO: 30.6 PG (ref 27–31)
MCHC RBC AUTO-ENTMCNC: 33.6 G/DL (ref 32–36)
MCV RBC AUTO: 91 FL (ref 82–98)
MONOCYTES # BLD AUTO: 0.8 K/UL (ref 0.3–1)
MONOCYTES NFR BLD: 7.4 % (ref 4–15)
NEUTROPHILS # BLD AUTO: 9 K/UL (ref 1.8–7.7)
NEUTROPHILS NFR BLD: 83.4 % (ref 38–73)
NRBC BLD-RTO: 0 /100 WBC
PHOSPHATE SERPL-MCNC: 7.8 MG/DL (ref 2.7–4.5)
PLATELET # BLD AUTO: 232 K/UL (ref 150–450)
PMV BLD AUTO: 10.8 FL (ref 9.2–12.9)
POCT GLUCOSE: 143 MG/DL (ref 70–110)
POCT GLUCOSE: 304 MG/DL (ref 70–110)
POTASSIUM SERPL-SCNC: 3.6 MMOL/L (ref 3.5–5.1)
RBC # BLD AUTO: 2.52 M/UL (ref 4–5.4)
SODIUM SERPL-SCNC: 133 MMOL/L (ref 136–145)
WBC # BLD AUTO: 10.8 K/UL (ref 3.9–12.7)

## 2024-03-02 PROCEDURE — 99900035 HC TECH TIME PER 15 MIN (STAT)

## 2024-03-02 PROCEDURE — 25000242 PHARM REV CODE 250 ALT 637 W/ HCPCS: Performed by: HOSPITALIST

## 2024-03-02 PROCEDURE — 25000003 PHARM REV CODE 250: Performed by: REGISTERED NURSE

## 2024-03-02 PROCEDURE — A4216 STERILE WATER/SALINE, 10 ML: HCPCS | Performed by: HOSPITALIST

## 2024-03-02 PROCEDURE — 80069 RENAL FUNCTION PANEL: CPT | Performed by: HOSPITALIST

## 2024-03-02 PROCEDURE — 63600175 PHARM REV CODE 636 W HCPCS: Performed by: HOSPITALIST

## 2024-03-02 PROCEDURE — 94760 N-INVAS EAR/PLS OXIMETRY 1: CPT

## 2024-03-02 PROCEDURE — 25000003 PHARM REV CODE 250: Performed by: HOSPITALIST

## 2024-03-02 PROCEDURE — 94761 N-INVAS EAR/PLS OXIMETRY MLT: CPT

## 2024-03-02 PROCEDURE — 87070 CULTURE OTHR SPECIMN AEROBIC: CPT | Performed by: STUDENT IN AN ORGANIZED HEALTH CARE EDUCATION/TRAINING PROGRAM

## 2024-03-02 PROCEDURE — 25000003 PHARM REV CODE 250: Performed by: STUDENT IN AN ORGANIZED HEALTH CARE EDUCATION/TRAINING PROGRAM

## 2024-03-02 PROCEDURE — 94640 AIRWAY INHALATION TREATMENT: CPT

## 2024-03-02 PROCEDURE — 85025 COMPLETE CBC W/AUTO DIFF WBC: CPT | Performed by: HOSPITALIST

## 2024-03-02 PROCEDURE — 36415 COLL VENOUS BLD VENIPUNCTURE: CPT | Performed by: HOSPITALIST

## 2024-03-02 RX ORDER — IBUPROFEN 200 MG
16 TABLET ORAL
Status: DISCONTINUED | OUTPATIENT
Start: 2024-03-02 | End: 2024-03-02 | Stop reason: HOSPADM

## 2024-03-02 RX ORDER — AMOXICILLIN AND CLAVULANATE POTASSIUM 250; 125 MG/1; MG/1
1 TABLET, FILM COATED ORAL EVERY 12 HOURS
Qty: 4 TABLET | Refills: 0 | Status: ON HOLD | OUTPATIENT
Start: 2024-03-03 | End: 2024-03-11 | Stop reason: HOSPADM

## 2024-03-02 RX ORDER — INSULIN ASPART 100 [IU]/ML
3 INJECTION, SOLUTION INTRAVENOUS; SUBCUTANEOUS
Status: DISCONTINUED | OUTPATIENT
Start: 2024-03-02 | End: 2024-03-02 | Stop reason: HOSPADM

## 2024-03-02 RX ORDER — INSULIN GLARGINE 100 [IU]/ML
15 INJECTION, SOLUTION SUBCUTANEOUS NIGHTLY
Start: 2024-03-03 | End: 2024-03-02

## 2024-03-02 RX ORDER — IBUPROFEN 200 MG
24 TABLET ORAL
Status: DISCONTINUED | OUTPATIENT
Start: 2024-03-02 | End: 2024-03-02 | Stop reason: HOSPADM

## 2024-03-02 RX ORDER — INSULIN GLARGINE 100 [IU]/ML
12 INJECTION, SOLUTION SUBCUTANEOUS NIGHTLY
Status: ON HOLD
Start: 2024-03-03 | End: 2024-03-11

## 2024-03-02 RX ORDER — IPRATROPIUM BROMIDE AND ALBUTEROL SULFATE 2.5; .5 MG/3ML; MG/3ML
3 SOLUTION RESPIRATORY (INHALATION) EVERY 4 HOURS PRN
Status: DISCONTINUED | OUTPATIENT
Start: 2024-03-02 | End: 2024-03-02 | Stop reason: HOSPADM

## 2024-03-02 RX ORDER — CALCITRIOL 0.5 UG/1
0.5 CAPSULE ORAL DAILY
Qty: 30 CAPSULE | Refills: 11 | Status: ON HOLD | OUTPATIENT
Start: 2024-03-03 | End: 2024-04-23 | Stop reason: HOSPADM

## 2024-03-02 RX ORDER — SODIUM BICARBONATE 650 MG/1
650 TABLET ORAL 3 TIMES DAILY
Qty: 90 TABLET | Refills: 11 | Status: ON HOLD | OUTPATIENT
Start: 2024-03-02 | End: 2024-03-11

## 2024-03-02 RX ORDER — GLUCAGON 1 MG
1 KIT INJECTION
Status: DISCONTINUED | OUTPATIENT
Start: 2024-03-02 | End: 2024-03-02 | Stop reason: HOSPADM

## 2024-03-02 RX ADMIN — ASPIRIN 81 MG CHEWABLE TABLET 81 MG: 81 TABLET CHEWABLE at 08:03

## 2024-03-02 RX ADMIN — INSULIN ASPART 3 UNITS: 100 INJECTION, SOLUTION INTRAVENOUS; SUBCUTANEOUS at 12:03

## 2024-03-02 RX ADMIN — Medication 100 MCG: at 08:03

## 2024-03-02 RX ADMIN — SODIUM BICARBONATE 650 MG TABLET 650 MG: at 08:03

## 2024-03-02 RX ADMIN — Medication 10 ML: at 06:03

## 2024-03-02 RX ADMIN — SEVELAMER CARBONATE 1600 MG: 800 TABLET, FILM COATED ORAL at 12:03

## 2024-03-02 RX ADMIN — ERGOCALCIFEROL 50000 UNITS: 1.25 CAPSULE ORAL at 08:03

## 2024-03-02 RX ADMIN — IPRATROPIUM BROMIDE AND ALBUTEROL SULFATE 3 ML: 2.5; .5 SOLUTION RESPIRATORY (INHALATION) at 09:03

## 2024-03-02 RX ADMIN — CARVEDILOL 25 MG: 25 TABLET, FILM COATED ORAL at 08:03

## 2024-03-02 RX ADMIN — VALSARTAN 160 MG: 160 TABLET, FILM COATED ORAL at 08:03

## 2024-03-02 RX ADMIN — CEFTRIAXONE SODIUM 2 G: 2 INJECTION, POWDER, FOR SOLUTION INTRAMUSCULAR; INTRAVENOUS at 12:03

## 2024-03-02 RX ADMIN — SEVELAMER CARBONATE 1600 MG: 800 TABLET, FILM COATED ORAL at 08:03

## 2024-03-02 RX ADMIN — PRAVASTATIN SODIUM 40 MG: 40 TABLET ORAL at 08:03

## 2024-03-02 RX ADMIN — INSULIN ASPART 4 UNITS: 100 INJECTION, SOLUTION INTRAVENOUS; SUBCUTANEOUS at 06:03

## 2024-03-02 RX ADMIN — CALCITRIOL CAPSULES 0.25 MCG 0.5 MCG: 0.25 CAPSULE ORAL at 08:03

## 2024-03-02 RX ADMIN — HYDRALAZINE HYDROCHLORIDE 100 MG: 25 TABLET, FILM COATED ORAL at 08:03

## 2024-03-02 RX ADMIN — HEPARIN SODIUM 5000 UNITS: 5000 INJECTION INTRAVENOUS; SUBCUTANEOUS at 08:03

## 2024-03-02 NOTE — PLAN OF CARE
Introduced self and VN model when entering room.Reviewed AVS with daughter and patient. Questions answered. Wheelchair requested

## 2024-03-02 NOTE — HOSPITAL COURSE
"Ms. Quinones presented initially with metabolic encephalopathy due to presumed infectious etiology.  Initially was found to be hypo glycemic with mild hypocalcemia, but mentation did not improve with administration of glucose.  She was severely hypothermic which is more consistent with infectious etiology.  She was placed on Price Hugger and started on IV antibiotics.  Initial workup with negative blood cultures, normal UA, non- infectious PD fluid, negative COVID and flu.  Chest x-ray does show interstitial opacification which could be consistent with pulmonary edema versus infection.  Given her overall clinical picture which seems to be infectious, will treat empirically for pneumonia.  Her mentation has greatly improved and she is essentially back to baseline.  Stable for discharge today to complete short course of p.o. antibiotics    BP (!) 153/67 (BP Location: Right arm, Patient Position: Sitting)   Pulse 67   Temp 97.4 °F (36.3 °C) (Oral)   Resp 20   Ht 5' 3" (1.6 m)   Wt 63.7 kg (140 lb 6.9 oz)   LMP  (LMP Unknown)   SpO2 95%   BMI 24.88 kg/m²   Physical Exam  Vitals reviewed.   Constitutional:       General: She is awake. She is not in acute distress.     Appearance: She is well-developed. She is ill-appearing. She is not diaphoretic.      Comments:   Frail   HENT:      Head: Normocephalic and atraumatic.      Nose: Nose normal.   Eyes:      General: No scleral icterus.     Pupils: Pupils are equal, round, and reactive to light.   Neck:      Vascular: No JVD.      Trachea: No tracheal deviation.   Cardiovascular:      Rate and Rhythm: Normal rate and regular rhythm.      Heart sounds: Normal heart sounds.   Pulmonary:      Effort: Pulmonary effort is normal. No respiratory distress.      Breath sounds: Normal breath sounds.   Abdominal:      General: There is no distension.      Palpations: Abdomen is soft.      Tenderness: There is no abdominal tenderness.      Comments:  PD catheter in place "   Musculoskeletal:         General: No deformity.      Cervical back: Normal range of motion.   Skin:     General: Skin is warm and dry.      Findings: No rash.   Neurological:      Mental Status: She is alert and oriented to person, place, and time.   Psychiatric:         Behavior: Behavior normal.

## 2024-03-02 NOTE — PROGRESS NOTES
Nephrology Progress Note       Consult Requested By: No att. providers found  Reason for Consult: ESRD on PD      SUBJECTIVE:         Review of Systems   Constitutional:  Positive for malaise/fatigue. Negative for chills and fever.   HENT:  Negative for congestion and sore throat.    Eyes:  Negative for blurred vision, double vision and photophobia.   Respiratory:  Negative for cough and shortness of breath.    Cardiovascular:  Negative for chest pain, palpitations and leg swelling.   Gastrointestinal:  Negative for abdominal pain, diarrhea, nausea and vomiting.   Genitourinary:  Negative for dysuria and urgency.   Musculoskeletal:  Negative for joint pain and myalgias.   Skin:  Negative for itching and rash.   Neurological:  Positive for weakness. Negative for dizziness, sensory change and headaches.   Endo/Heme/Allergies:  Negative for polydipsia. Does not bruise/bleed easily.   Psychiatric/Behavioral:  Negative for depression.        Past Medical History:   Diagnosis Date    Acute encephalopathy 2/29/2024    Acute hypoxemic respiratory failure 12/19/2019    Acute right-sided thoracic back pain 12/09/2019    Allergy     Asthma     Basal cell carcinoma     left forehead    Basal cell carcinoma     left nose    Basal cell carcinoma 05/20/2015    right nose    Basal cell carcinoma 12/22/2015    left lower post neck    Basal cell carcinoma 12/03/2019    left ant scalp     BCC (basal cell carcinoma of skin) 10/20/2022    Right alar groove    Bilateral pleural effusion     Bilateral renal cysts     Breast cancer     CAD (coronary artery disease)     Cardiomyopathy     Cardiomyopathy, ischemic     Cataract     CHF (congestive heart failure)     CKD (chronic kidney disease) stage 4, GFR 15-29 ml/min     Colon polyp 2011    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, with long-term current use of insulin 02/22/2018    COPD (chronic obstructive  pulmonary disease)     COPD exacerbation 04/08/2018    Current mild episode of major depressive disorder without prior episode 01/25/2022    Defibrillator discharge     Dependence on renal dialysis 08/22/2023    Diabetes mellitus     Diabetes mellitus type II     Diabetes with neurologic complications     Edema     ESRD needing dialysis     Goiter     MNG    Hematuria, unspecified     HX: breast cancer     Hyperlipidemia     Hypertension     Hypocalcemia     Hypokalemia     Hyponatremia     Hyponatremia 04/02/2022    Iron deficiency anemia 05/16/2017    Iron deficiency anemia     Iron deficiency anemia     Left kidney mass     Meningioma     Microalbuminuria due to type 2 diabetes mellitus 01/26/2022    Osteoporosis, postmenopausal     Pneumonia 12/08/2019    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    Proteinuria 01/21/2019    Pseudomonas pneumonia     SCC (squamous cell carcinoma) 08/25/2022    right posterior neck    Skin cancer     s/p excision    Sleep apnea     CPAP    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/2015    mid forehead    Unspecified vitamin D deficiency     UTI (urinary tract infection) 04/02/2022    Ventricular tachycardia     Vitamin B12 deficiency     Vitamin D deficiency disease          OBJECTIVE:     Vital Signs (Most Recent)  Vitals:    03/02/24 0902 03/02/24 0909 03/02/24 1145 03/02/24 1248   BP: (!) 160/69  (!) 153/67    BP Location: Right arm  Right arm    Patient Position: Sitting  Sitting    Pulse: 77 77 83 67   Resp: 20 20     Temp: 97.4 °F (36.3 °C)  97.4 °F (36.3 °C)    TempSrc:   Oral    SpO2: 95% (!) 93% 95%    Weight:       Height:             Date 03/02/24 0700 - 03/03/24 0659(Discharged)   Shift 7877-2416 2887-6150 6368-6332 24 Hour Total   INTAKE   IV Piggyback 50   50   Shift Total(mL/kg) 50(0.8)   50(0.8)   OUTPUT   Urine(mL/kg/hr) 400   400   Other 1043   1043   Shift Total(mL/kg) 1443(22.7)   1443(22.7)   Weight (kg) 63.7 63.7 63.7 63.7             Medications:   aspirin  81 mg  Oral Daily    calcitRIOL  0.5 mcg Oral Daily    carvediloL  25 mg Oral BID    cefTRIAXone (Rocephin) IV (PEDS and ADULTS)  2 g Intravenous Q24H    cyanocobalamin  100 mcg Oral Daily    ergocalciferol  50,000 Units Oral Q7 Days    heparin (porcine)  5,000 Units Subcutaneous Q12H    hydrALAZINE  100 mg Oral BID    insulin aspart U-100  3 Units Subcutaneous TIDWM    insulin detemir U-100  7 Units Subcutaneous BID    polyethylene glycol  17 g Oral Daily    pravastatin  40 mg Oral Daily    sevelamer carbonate  1,600 mg Oral TID WM    sodium bicarbonate  650 mg Oral TID    sodium chloride 0.9%  10 mL Intravenous Q8H    valsartan  160 mg Oral Daily           Physical Exam  Vitals and nursing note reviewed.   Constitutional:       General: She is not in acute distress.     Appearance: She is not diaphoretic.      Comments: Frail    HENT:      Head: Normocephalic and atraumatic.      Mouth/Throat:      Pharynx: No oropharyngeal exudate.   Eyes:      General: No scleral icterus.     Conjunctiva/sclera: Conjunctivae normal.      Pupils: Pupils are equal, round, and reactive to light.   Cardiovascular:      Rate and Rhythm: Normal rate and regular rhythm.      Heart sounds: Normal heart sounds. No murmur heard.  Pulmonary:      Effort: Pulmonary effort is normal. No respiratory distress.      Breath sounds: Normal breath sounds.   Abdominal:      General: Bowel sounds are normal. There is no distension.      Palpations: Abdomen is soft.      Tenderness: There is no abdominal tenderness.      Comments: PD catheter site no drainage no TTP    Musculoskeletal:         General: Normal range of motion.      Cervical back: Normal range of motion and neck supple.   Skin:     General: Skin is warm and dry.      Findings: No erythema.   Neurological:      Mental Status: She is alert and oriented to person, place, and time.      Cranial Nerves: No cranial nerve deficit.      Motor: Weakness present.   Psychiatric:         Mood and Affect:  Affect normal.         Cognition and Memory: Memory normal.         Judgment: Judgment normal.         Laboratory:  Recent Labs   Lab 02/29/24  0946 03/01/24  0606 03/02/24  0607   WBC 8.45 10.27 10.80   HGB 8.5* 8.6* 7.7*   HCT 25.4* 26.2* 22.9*    248 232   MONO 4.9  0.4 2.2*  0.2* 7.4  0.8   EOSINOPHIL 1.2 0.0 0.0       Recent Labs   Lab 02/29/24  0946 03/01/24  0606 03/02/24  0607   * 132*  133* 133*   K 3.6 3.4*  3.4* 3.6    101  101 99   CO2 12* 12*  12* 15*   * 89*  89* 87*   CREATININE 5.1* 4.7*  4.7* 4.4*   CALCIUM 6.6* 7.5*  7.5* 7.1*   PHOS  --  8.7* 7.8*         Diagnostic Results:  X-Ray: Reviewed  US: Reviewed  Echo: Reviewed  ASSESSMENT/PLAN:     1. ESRD on PD  follows with Dr Borrero,    Was AMS till this AM now AAOx3     -- 2/29  PD fluid culture and Cell count - no infection,       Prior to this admission patient with excellent residual renal function.  Prescription PD  Monday and Thursday 1 exchange over 4hr 1.5%  Now appears to be declining.    -- during the hospital stay did PD  with 1.5% 2L dwell  3 Cycles over 10 hr     BUN still on the high side as well as phosphorus  We will start patient on binders with meals.  New line discussed with patient that it is okay not to do peritoneal dialysis today and tomorrow, but on Monday she will [facility] work and we will start nightly dialysis with Icodextrin.  Dialysis nurse already have ordered I could dextran but if the shipping has not arrived by Monday then she will give it to her from the clinic.        -- Please avoid nephrotoxins, including NSAIDs, aminoglycosides, IV contrast (unless absolutely necessary), gadolinium, fleets and other phosphorous-based laxatives. Caution with antibiotics.    Hypothermia AMS - better per Neuro not Stroke   -- less likely infection ? PNA on abx already   -- Already on IV abx no Abd pain -   PD fluid culture - pending  cell count - at  range   -- Less likely Uremia   -- No Liver disease     2. HTN   - controlled   3. Anemia of chronic kidney disease treated with MIGUEL   Mycera, we will also give additional dose of 50 on Monday  Recent Labs   Lab 02/29/24  0946 03/01/24  0606 03/02/24  0607   HGB 8.5* 8.6* 7.7*   HCT 25.4* 26.2* 22.9*    248 232         Iron   Lab Results   Component Value Date    IRON 124 05/09/2023    TIBC 355 05/09/2023    FERRITIN 639 (H) 05/19/2023       4. MBD (E88.9 M90.80) - Hypocalcemia most likely related to phosphorus, continue with phosphorus binders.  Continue calcitriol    -- Sevelamer 1600 TIDWM   Recent Labs   Lab 02/29/24  0946   MG 1.8         Lab Results   Component Value Date    .5 (H) 03/01/2024    CALCIUM 7.1 (L) 03/02/2024    CAION 0.91 (L) 02/29/2024    PHOS 7.8 (H) 03/02/2024     Lab Results   Component Value Date    [id number] 23 (L) 03/01/2024     Acidosis   -- PO Bicarbonate 650 TID     Lab Results   Component Value Date    CO2 15 (L) 03/02/2024       5. Nutrition/Hypoalbuminemia    Recent Labs   Lab 02/29/24  0946 03/01/24  0606 03/02/24  0607   LABPROT 12.8*  --   --    ALBUMIN 2.6* 2.6*  2.5* 2.5*       Nepro with meals TID.       Thank you for allowing me to participate in care of your patient  With any question please call   Mansi Borrero MD     [facility]  ESTRELLA Morocho MD,   MD TAMIKA Isabel MD E. V. Harmon, NP  [id number]   [place], [address] [phone]  After hours answering service: [phone]

## 2024-03-02 NOTE — PLAN OF CARE
SW made aware of possible dc today. Pts daughter declined outpatient PT/OT and HH services. Pts daughter will provide transport home upon dc. SW will continue to follow pt throughout her transitions of care and assist with any dc needs.     Cleared from CM . Bedside Nurse and VN notified.    Future Appointments   Date Time Provider Department Center   3/14/2024  9:30 AM CARRIE Arechiga MD [facility] OPHTHA [place]   4/10/2024  3:30 PM Kelvin Maki MD [facility] PULMSVC [facility]   4/17/2024  9:00 AM Eric Brooke PA-C [facility] UROGYN [facility]   4/24/2024 10:40 AM Dayton Michael MD [facility] MED [facility]   6/5/2024 12:15 PM EKG, APPT [facility] EKG [facility]   6/5/2024 12:40 PM COORDINATED DEVICE CHECK [facility] ARRHPRO [facility]   6/5/2024  1:30 PM Celeste Rogers, SHELBI [facility] ARRHYTH [facility]        03/02/24 1104   Final Note   Assessment Type Final Discharge Note   Anticipated Discharge Disposition Home   Hospital Resources/Appts/Education Provided Appointments scheduled and added to AVS   Post-Acute Status   Discharge Delays None known at this time

## 2024-03-02 NOTE — PLAN OF CARE
[facility]      HOME HEALTH ORDERS  FACE TO FACE ENCOUNTER    Patient Name: Myesha Quinones  YOB: 1940    PCP: Dayton Michael MD   PCP Address: [address] / [place] [address]  PCP Phone Number: [phone]  PCP Fax: [phone]    Encounter Date: 2/29/24    Admit to Home Health    Diagnoses:  Active Hospital Problems    Diagnosis  POA    *Hypothermia [T68.XXXA]  Yes    Acute metabolic encephalopathy [G93.41]  Yes    Hypocalcemia [E83.51]  Yes    Type 2 diabetes mellitus with hypoglycemia, with long-term current use of insulin [E11.649, Z79.4]  Not Applicable    ESRD on peritoneal dialysis [N18.6, Z99.2]  Not Applicable    Essential hypertension [I10]  Yes    History of non anemic vitamin B12 deficiency [Z86.39]  Not Applicable    Hyponatremia [E87.1]  Yes    Acute on chronic combined systolic and diastolic heart failure [I50.43]  Yes     - continue optimization of BP and heart failure as possible.  Since starting PD, this seems to be much better controlled.      Hyperlipidemia associated with type 2 diabetes mellitus [E11.69, E78.5]  Yes    Metabolic bone disease [M89.8X9]  Yes    Iron deficiency anemia [D50.9]  Yes    Biventricular ICD (implantable cardioverter-defibrillator) in place [Z95.810]  Yes      Resolved Hospital Problems   No resolved problems to display.       Follow Up Appointments:  Future Appointments   Date Time Provider Department Center   3/14/2024  9:30 AM CARRIE Arechiga MD [facility] [place]   4/10/2024  3:30 PM Kelvin Maki MD [facility] PULMSVC [facility]   4/17/2024  9:00 AM Eric Brooke PA-C [facility] UROGYN [facility]   4/24/2024 10:40 AM Dayton Michael MD [facility] [place] [facility]   6/5/2024 12:15 PM EKG, APPT [facility] E[facility]   6/5/2024 12:40 PM COORDINATED DEVICE CHECK [facility] ARRHPRO [facility]   6/5/2024  1:30 PM Celeste Rogers, NP [facility] ARRHYTH [facility]       Allergies:  Review of patient's allergies indicates:   Allergen  Reactions    Iodine and iodide containing products Hives and Other (See Comments)     Not specified     Nifedipine      weakness       Medications: Review discharge medications with patient and family and provide education.    Current Facility-Administered Medications   Medication Dose Route Frequency Provider Last Rate Last Admin    acetaminophen tablet 1,000 mg  1,000 mg Oral Q8H PRN Danish Reyes MD   1,000 mg at 02/29/24 2053    acetaminophen tablet 650 mg  650 mg Oral Q4H PRN Danish Reyes MD   650 mg at 03/01/24 2001    albuterol-ipratropium 2.5 mg-0.5 mg/3 mL nebulizer solution 3 mL  3 mL Nebulization Q4H PRN Danish Reyes MD   3 mL at 03/02/24 0909    aspirin chewable tablet 81 mg  81 mg Oral Daily Danish Reyes MD   81 mg at 03/02/24 0825    calcitRIOL capsule 0.5 mcg  0.5 mcg Oral Daily Anna Fairbanks MD   0.5 mcg at 03/02/24 0824    carvediloL tablet 25 mg  25 mg Oral BID Alexx Hoffmann NP   25 mg at 03/02/24 0824    cefTRIAXone (ROCEPHIN) 2 g in dextrose 5 % in water (D5W) 100 mL IVPB (MB+)  2 g Intravenous Q24H Danish Reyes  mL/hr at 03/02/24 1221 2 g at 03/02/24 1221    cyanocobalamin tablet 100 mcg  100 mcg Oral Daily Danish Reyes MD   100 mcg at 03/02/24 0824    dextrose 10% bolus 125 mL 125 mL  12.5 g Intravenous PRN Danish Reyes MD        dextrose 10% bolus 125 mL 125 mL  12.5 g Intravenous PRN Danish Reyes MD        dextrose 10% bolus 250 mL 250 mL  25 g Intravenous PRN Danish Reyes MD        dextrose 10% bolus 250 mL 250 mL  25 g Intravenous PRN Danish Reyes MD        ergocalciferol capsule 50,000 Units  50,000 Units Oral Q7 Days Danihs Reyes MD   50,000 Units at 03/02/24 0830    glucagon (human recombinant) injection 1 mg  1 mg Intramuscular PRN Danish Reyes MD        glucagon (human recombinant) injection 1 mg  1 mg Intramuscular PRN  Danish Reyes MD        glucose chewable tablet 16 g  16 g Oral PRN Danish Reyes MD        glucose chewable tablet 16 g  16 g Oral PRN Danish Reyes MD        glucose chewable tablet 24 g  24 g Oral PRN Danish Reyes MD        glucose chewable tablet 24 g  24 g Oral PRN Danish Reyes MD        heparin (porcine) injection 5,000 Units  5,000 Units Subcutaneous Q12H Danish Reyes MD   5,000 Units at 03/02/24 0828    hydrALAZINE tablet 100 mg  100 mg Oral BID Danish Reyes MD   100 mg at 03/02/24 0825    insulin aspart U-100 pen 0-5 Units  0-5 Units Subcutaneous QID (AC + HS) PRN Danish Reyes MD   4 Units at 03/02/24 0610    insulin aspart U-100 pen 3 Units  3 Units Subcutaneous TIDWM Danish Reyes MD   3 Units at 03/02/24 1215    insulin detemir U-100 (Levemir) pen 7 Units  7 Units Subcutaneous BID Danish Reyes MD   7 Units at 03/02/24 0833    melatonin tablet 6 mg  6 mg Oral Nightly PRN Danish Reyes MD        naloxone 0.4 mg/mL injection 0.02 mg  0.02 mg Intravenous PRN Danish Reyes MD        ondansetron disintegrating tablet 8 mg  8 mg Oral Q8H PRN Danish Reyes MD        ondansetron injection 4 mg  4 mg Intravenous Q8H PRN Danish Reyes MD        polyethylene glycol packet 17 g  17 g Oral Daily Danish Reyes MD   17 g at 02/29/24 1220    pravastatin tablet 40 mg  40 mg Oral Daily Danish Reyes MD   40 mg at 03/02/24 0825    sevelamer carbonate tablet 1,600 mg  1,600 mg Oral TID  Anna Fairbanks MD   1,600 mg at 03/02/24 1215    sodium bicarbonate tablet 650 mg  650 mg Oral TID Anna Fairbanks MD   650 mg at 03/02/24 0825    sodium chloride 0.9% flush 10 mL  10 mL Intravenous Q8H Danish Reyes MD   10 mL at 03/02/24 0613    valsartan tablet 160 mg  160 mg Oral Daily Danish Reyes MD   160 mg at 03/02/24 0868      Current Discharge Medication List        START taking these medications    Details   amoxicillin-clavulanate 250-125mg (AUGMENTIN) 250-125 mg Tab Take 1 tablet by mouth every 12 (twelve) hours. for 2 days  Qty: 4 tablet, Refills: 0      calcitRIOL (ROCALTROL) 0.5 MCG Cap Take 1 capsule (0.5 mcg total) by mouth once daily.  Qty: 30 capsule, Refills: 11      sodium bicarbonate 650 MG tablet Take 1 tablet (650 mg total) by mouth 3 (three) times daily.  Qty: 90 tablet, Refills: 11           CONTINUE these medications which have CHANGED    Details   insulin (LANTUS SOLOSTAR U-100 INSULIN) glargine 100 units/mL SubQ pen Inject 12 Units into the skin every evening.    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      albuterol (PROVENTIL/VENTOLIN HFA) 90 mcg/actuation inhaler Inhale 2 puffs into the lungs every 6 (six) hours as needed for Wheezing. Rescue  Qty: 18 g, Refills: 12    Associated Diagnoses: Cough      albuterol-ipratropium (DUO-NEB) 2.5 mg-0.5 mg/3 mL nebulizer solution Take 3 mLs by nebulization every 4 (four) hours as needed for Wheezing.  Qty: 270 mL, Refills: 12    Associated Diagnoses: Chronic obstructive pulmonary disease, unspecified COPD type; Cough, unspecified type      aspirin 81 MG Chew Take 81 mg by mouth once daily.      carvediloL (COREG) 25 MG tablet TAKE 2 TABLETS (50 MG TOTAL) BY MOUTH 2 (TWO) TIMES DAILY.  Qty: 360 tablet, Refills: 3    Comments: .      cholecalciferol, vitamin D3, (VITAMIN D3) 50 mcg (2,000 unit) Tab Take 1 tablet by mouth once daily.       cyanocobalamin (VITAMIN B-12) 100 MCG tablet Take 100 mcg by mouth once daily.      epoetin [place]-epbx (RETACRIT) 10,000 unit/mL imjection Inject 1 mL (10,000 Units  total) into the skin every 30 days.  Qty: 1 mL, Refills: 11      ergocalciferol (ERGOCALCIFEROL) 50,000 unit Cap Take 50,000 Units by mouth every 7 days.      estradioL (ESTRACE) 0.01 % (0.1 mg/gram) vaginal cream PLACE 1 GRAM VAGINALLY EVERY OTHER DAY  Qty: 42.5 g, Refills: 3    Associated Diagnoses: Postmenopause atrophic vaginitis; Urinary retention; Encounter for fitting and adjustment of pessary      estradioL (ESTRING) 2 mg (7.5 mcg /24 hour) vaginal ring Place 2 mg vaginally every 3 (three) months.  Qty: 1 each, Refills: 3    Associated Diagnoses: Postmenopause atrophic vaginitis; Pessary maintenance      fluticasone propionate (FLONASE) 50 mcg/actuation nasal spray 1 spray by Each Nostril route daily as needed.      furosemide (LASIX) 40 MG tablet Take 1 tablet (40 mg total) by mouth once daily.  Qty: 90 tablet, Refills: 3    Associated Diagnoses: Cardiomyopathy, ischemic; Chronic combined systolic (congestive) and diastolic (congestive) heart failure      hydrALAZINE (APRESOLINE) 100 MG tablet Take 1 tablet (100 mg total) by mouth 2 (two) times daily.  Qty: 90 tablet, Refills: 3    Comments: .      montelukast (SINGULAIR) 10 mg tablet Take 1 tablet (10 mg total) by mouth once daily.  Qty: 90 tablet, Refills: 3    Associated Diagnoses: Mild intermittent chronic asthma without complication      nitroGLYCERIN (NITROSTAT) 0.4 MG SL tablet Place 0.4 mg under the tongue every 5 (five) minutes as needed for Chest pain.       omega-3 fatty acids 500 mg Cap Take 1 capsule by mouth Daily.      OZEMPIC 0.25 mg or 0.5 mg (2 mg/3 mL) pen injector Inject 0.25 mg into the skin every Wednesday.      pravastatin (PRAVACHOL) 40 MG tablet Take 1 tablet (40 mg total) by mouth once daily.  Qty: 90 tablet, Refills: 3    Associated Diagnoses: Chronic combined systolic and diastolic heart failure; Hyperlipidemia associated with type 2 diabetes mellitus; Mixed hyperlipidemia      valsartan (DIOVAN) 160 MG tablet Take 160 mg by  "mouth once daily.      blood sugar diagnostic (ACCU-CHEK HECTOR) Strp Uses Accu-Check Hector meter to test BG 4x/day  Qty: 400 strip, Refills: 6    Associated Diagnoses: Diabetic polyneuropathy associated with type 2 diabetes mellitus      dulaglutide (TRULICITY) 1.5 mg/0.5 mL pen injector Inject 1.5 mg into the skin every 7 days.  Qty: 4 Pen, Refills: 11    Associated Diagnoses: CKD (chronic kidney disease) stage 4, GFR 15-29 ml/min      lancets (ACCU-CHEK SOFTCLIX LANCETS) Misc Uses Accu-Chek Hector meter to test BG 2x/day  Qty: 400 each, Refills: 6    Associated Diagnoses: Controlled type 2 diabetes mellitus with both eyes affected by mild nonproliferative retinopathy and macular edema, with long-term current use of insulin; Diabetic polyneuropathy associated with type 2 diabetes mellitus      pen needle, diabetic (BD ULTRA-FINE MINI PEN NEEDLE) 31 gauge x 3/16" Ndle Use with insulin twice a day.  Qty: 400 each, Refills: 3    Associated Diagnoses: Diabetic polyneuropathy associated with type 2 diabetes mellitus      sevelamer carbonate (RENVELA) 800 mg Tab Take 1 tablet (800 mg total) by mouth 3 (three) times daily.  Qty: 90 tablet, Refills: 3    Associated Diagnoses: CKD (chronic kidney disease) stage 4, GFR 15-29 ml/min               I have seen and examined this patient within the last 30 days. My clinical findings that support the need for the home health skilled services and home bound status are the following:no   Weakness/numbness causing balance and gait disturbance due to Weakness/Debility making it taxing to leave home.     Diet:   renal diet    Labs:  na    Referrals/ Consults  Physical Therapy to evaluate and treat. Evaluate for home safety and equipment needs; Establish/upgrade home exercise program. Perform / instruct on therapeutic exercises, gait training, transfer training, and Range of Motion.  Occupational Therapy to evaluate and treat. Evaluate home environment for safety and equipment needs. " Perform/Instruct on transfers, ADL training, ROM, and therapeutic exercises.  Aide to provide assistance with personal care, ADLs, and vital signs.    Activities:   activity as tolerated    Nursing:   Agency to admit patient within 24 hours of hospital discharge unless specified on physician order or at patient request    SN to complete comprehensive assessment including routine vital signs. Instruct on disease process and s/s of complications to report to MD. Review/verify medication list sent home with the patient at time of discharge  and instruct patient/caregiver as needed. Frequency may be adjusted depending on start of care date.     Skilled nurse to perform up to 3 visits PRN for symptoms related to diagnosis    Notify MD if SBP > 160 or < 90; DBP > 90 or < 50; HR > 120 or < 50; Temp > 101; O2 < 88%; Other:       Ok to schedule additional visits based on staff availability and patient request on consecutive days within the home health episode.    When multiple disciplines ordered:    Start of Care occurs on Sunday - Wednesday schedule remaining discipline evaluations as ordered on separate consecutive days following the start of care.    Thursday SOC -schedule subsequent evaluations Friday and Monday the following week.     Friday - Saturday SOC - schedule subsequent discipline evaluations on consecutive days starting Monday of the following week.    For all post-discharge communication and subsequent orders please contact patient's primary care physician. If unable to reach primary care physician or do not receive response within 30 minutes, please contact Ev Jiménez for clinical staff order clarification    Miscellaneous   Routine Skin for Bedridden Patients: Instruct patient/caregiver to apply moisture barrier cream to all skin folds and wet areas in perineal area daily and after baths and all bowel movements.    Home Health Aide:  Nursing Twice weekly, Physical Therapy Twice weekly, and Occupational  Therapy Twice weekly    Wound Care Orders  no    I certify that this patient is confined to her home and needs intermittent skilled nursing care, physical therapy, and occupational therapy.

## 2024-03-02 NOTE — PLAN OF CARE
AAOx4. Complaint of headache, prn medication administered. Tele monitored. Glucose monitored. Medications administered per MAR. Safety maintained. Call bell within reach.

## 2024-03-02 NOTE — NURSING
Patient Alert and awake. For discharge today. LLQ PD cath intact. Dr Jo spoke with patient prior to DC. AVS printed and handed to patient. Home meds to be picked up to pharm of choice. PIV and tele removed. VN to review AVS and request transport.

## 2024-03-02 NOTE — PROGRESS NOTES
03/02/24 0950        Peritoneal Dialysis Catheter 04/25/23 0930 Left lower abdomen   Placement Date/Time: 04/25/23 0930   Present Prior to Hospital Arrival?: No  Inserted by: MD  Catheter Location: Left lower abdomen   Site Assessment Clean;Dry;Intact;No warmth;No drainage   Dressing Intervention Integrity maintained   Status Deaccessed   Dressing Status Clean;Dry;Intact   Dressing Gauze   Securement secured to abdomen w/ adhesive device   Clamp Status clamped   Exchange completed   Peritoneal Dialysis   Exchange Type Cycler   Peritoneal Treatment Status Completed   Dianeal Solution Dextrose 1.5% in 2000 mL;Dextrose 1.5% in 6000 mL   Cycler Peritoneal Dialysis   Initial Drain Volume (mL) 1225 mL   Effluent Appearance Yellow   Number of Cycles 4   Fill Volume (mL) 2000 mL   Total Volume (mL) 8000 mL   Dwell Time (specify hr/min) 1.35   Average Dwell Time (specify hr/min) 1.33   Net Positive (mL) 0 mL   Cycler PD Total UF (mL) 1043 mL   Cycler Treatment Comments completed overnight without complications. PD fluid aerobic culture swab sent with extra fluid

## 2024-03-02 NOTE — DISCHARGE SUMMARY
[facility]  Discharge Summary      Patient Name: Myesha Quinones  MRN: [id number]  ALEXIS: [id number]  Patient Class: IP- Inpatient  Admission Date: 2/29/2024  Hospital Length of Stay: 2 days  Discharge Date and Time:  03/02/2024 1:13 PM  Attending Physician: Danish Reyes.,*   Discharging Provider: Danish Reyes MD  Primary Care Provider: Dayton Michael MD    Primary Care Team: Networked reference to record PCT     HPI:   Ms. Quinones is an 83-year-old woman with ESRD on PD, type 2 diabetes, and hypertension who presents with encephalopathy.  History obtained mostly via daughter at bedside.  She reports that over the past 2 days prior to admission her mother had reported some  generalized fatigue and discomfort but no focalizing symptoms. Reportedly was started on his intake and took 1st dose yesterday.  Daughter states that they normally speak to her on the phone in the morning but did not hear from her at 7:00 a.m. as usual.  Around 8:00 a.m. she had her  drive by to check on her and at that point she was found down prompting call to EMS.  Initially CABG was 65 and she was given glucose without response.    * No surgery found *      Hospital Course:   Ms. Quinones presented initially with metabolic encephalopathy due to presumed infectious etiology.  Initially was found to be hypo glycemic with mild hypocalcemia, but mentation did not improve with administration of glucose.  She was severely hypothermic which is more consistent with infectious etiology.  She was placed on Price Hugger and started on IV antibiotics.  Initial workup with negative blood cultures, normal UA, non- infectious PD fluid, negative COVID and flu.  Chest x-ray does show interstitial opacification which could be consistent with pulmonary edema versus infection.  Given her overall clinical picture which seems to be infectious, will treat empirically for pneumonia.  Her mentation has greatly  "improved and she is essentially back to baseline.  Stable for discharge today to complete short course of p.o. antibiotics    BP (!) 153/67 (BP Location: Right arm, Patient Position: Sitting)   Pulse 67   Temp 97.4 °F (36.3 °C) (Oral)   Resp 20   Ht 5' 3" (1.6 m)   Wt 63.7 kg (140 lb 6.9 oz)   LMP  (LMP Unknown)   SpO2 95%   BMI 24.88 kg/m²   Physical Exam  Vitals reviewed.   Constitutional:       General: She is awake. She is not in acute distress.     Appearance: She is well-developed. She is ill-appearing. She is not diaphoretic.      Comments:   Frail   HENT:      Head: Normocephalic and atraumatic.      Nose: Nose normal.   Eyes:      General: No scleral icterus.     Pupils: Pupils are equal, round, and reactive to light.   Neck:      Vascular: No JVD.      Trachea: No tracheal deviation.   Cardiovascular:      Rate and Rhythm: Normal rate and regular rhythm.      Heart sounds: Normal heart sounds.   Pulmonary:      Effort: Pulmonary effort is normal. No respiratory distress.      Breath sounds: Normal breath sounds.   Abdominal:      General: There is no distension.      Palpations: Abdomen is soft.      Tenderness: There is no abdominal tenderness.      Comments:  PD catheter in place   Musculoskeletal:         General: No deformity.      Cervical back: Normal range of motion.   Skin:     General: Skin is warm and dry.      Findings: No rash.   Neurological:      Mental Status: She is alert and oriented to person, place, and time.   Psychiatric:         Behavior: Behavior normal.      Goals of Care Treatment Preferences:  Code Status: Full Code      Consults:   Consults (From admission, onward)          Status Ordering Provider     Inpatient consult to [facility] (Rashawn Austin, Gissell)  Once        Provider:  (Not yet assigned)    Completed KWADWO TINAJERO     Consult to Telemedicine - Acute Stroke  Once        Provider:  (Not yet assigned)    Completed MELINDA GALVEZ" NICKOLAS Duarte new Assessment & Plan notes have been filed under this hospital service since the last note was generated.  Service: Hospital Medicine    Final Active Diagnoses:    Diagnosis Date Noted POA    PRINCIPAL PROBLEM:  Hypothermia [T68.XXXA] 04/02/2022 Yes    Acute metabolic encephalopathy [G93.41] 02/29/2024 Yes    Hypocalcemia [E83.51] 02/29/2024 Yes    Type 2 diabetes mellitus with hypoglycemia, with long-term current use of insulin [E11.649, Z79.4] 02/29/2024 Not Applicable    ESRD on peritoneal dialysis [N18.6, Z99.2] 08/22/2023 Not Applicable    Essential hypertension [I10] 06/20/2022 Yes    History of non anemic vitamin B12 deficiency [Z86.39] 04/26/2022 Not Applicable    Hyponatremia [E87.1] 04/02/2022 Yes    Acute on chronic combined systolic and diastolic heart failure [I50.43] 03/23/2021 Yes    Hyperlipidemia associated with type 2 diabetes mellitus [E11.69, E78.5] 07/27/2020 Yes    Metabolic bone disease [M89.8X9] 01/21/2019 Yes    Iron deficiency anemia [D50.9] 05/16/2017 Yes    Biventricular ICD (implantable cardioverter-defibrillator) in place [Z95.810] 01/09/2014 Yes      Problems Resolved During this Admission:       Discharged Condition: good    Disposition: Home or Self Care    Follow Up:    Patient Instructions:      Diet renal     Notify your health care provider if you experience any of the following:  temperature >100.4     Notify your health care provider if you experience any of the following:  persistent nausea and vomiting or diarrhea     Notify your health care provider if you experience any of the following:  severe uncontrolled pain     Notify your health care provider if you experience any of the following:  difficulty breathing or increased cough     Notify your health care provider if you experience any of the following:  severe persistent headache     Notify your health care provider if you experience any of the following:  persistent dizziness, light-headedness, or  visual disturbances     Notify your health care provider if you experience any of the following:  increased confusion or weakness     Activity as tolerated       Significant Diagnostic Studies: N/A    Pending Diagnostic Studies:       Procedure Component Value Units Date/Time    Amylase, Body Fluid (Reference Lab or Non-[facility]) [[id number]] Collected: 02/29/24 1700    Order Status: Sent Lab Status: In process Updated: 02/29/24 2211    Specimen: Body Fluid     Hepatitis B Surface Antibody, Qual/Quant [[id number]] Collected: 02/29/24 1607    Order Status: Sent Lab Status: In process Updated: 02/29/24 1852    Specimen: Blood     Lipase, Body Fluid Dialysate [[id number]] Collected: 02/29/24 1700    Order Status: Sent Lab Status: In process Updated: 02/29/24 2211    Specimen: Body Fluid            Medications:  Reconciled Home Medications:      Medication List        START taking these medications      amoxicillin-clavulanate 250-125mg 250-125 mg Tab  Commonly known as: AUGMENTIN  Take 1 tablet by mouth every 12 (twelve) hours. for 2 days  Start taking on: March 3, 2024     calcitRIOL 0.5 MCG Cap  Commonly known as: ROCALTROL  Take 1 capsule (0.5 mcg total) by mouth once daily.  Start taking on: March 3, 2024     sodium bicarbonate 650 MG tablet  Take 1 tablet (650 mg total) by mouth 3 (three) times daily.            CHANGE how you take these medications      furosemide 40 MG tablet  Commonly known as: LASIX  Take 1 tablet (40 mg total) by mouth once daily.  What changed:   when to take this  reasons to take this     LANTUS SOLOSTAR U-100 INSULIN glargine 100 units/mL SubQ pen  Generic drug: insulin  Inject 12 Units into the skin every evening.  Start taking on: March 3, 2024  What changed: how much to take            CONTINUE taking these medications      acetaminophen 500 MG tablet  Commonly known as: TYLENOL  Take 500 mg by mouth as needed.     albuterol 90 mcg/actuation inhaler  Commonly known as: PROVENTIL/VENTOLIN  HFA  Inhale 2 puffs into the lungs every 6 (six) hours as needed for Wheezing. Rescue     albuterol-ipratropium 2.5 mg-0.5 mg/3 mL nebulizer solution  Commonly known as: DUO-NEB  Take 3 mLs by nebulization every 4 (four) hours as needed for Wheezing.     aspirin 81 MG Chew  Take 81 mg by mouth once daily.     blood sugar diagnostic Strp  Commonly known as: ACCU-CHEK HECTOR  Uses Accu-Check Hector meter to test BG 4x/day     carvediloL 25 MG tablet  Commonly known as: COREG  TAKE 2 TABLETS (50 MG TOTAL) BY MOUTH 2 (TWO) TIMES DAILY.     cholecalciferol (vitamin D3) 50 mcg (2,000 unit) Tab  Commonly known as: VITAMIN D3  Take 1 tablet by mouth once daily.     cyanocobalamin 100 MCG tablet  Commonly known as: VITAMIN B-12  Take 100 mcg by mouth once daily.     epoetin [place]-epbx 10,000 unit/mL imjection  Commonly known as: RETACRIT  Inject 1 mL (10,000 Units total) into the skin every 30 days.     ergocalciferol 50,000 unit Cap  Commonly known as: ERGOCALCIFEROL  Take 50,000 Units by mouth every 7 days.     * estradioL 0.01 % (0.1 mg/gram) vaginal cream  Commonly known as: ESTRACE  PLACE 1 GRAM VAGINALLY EVERY OTHER DAY     * estradioL 2 mg (7.5 mcg /24 hour) vaginal ring  Commonly known as: ESTRING  Place 2 mg vaginally every 3 (three) months.     fluticasone propionate 50 mcg/actuation nasal spray  Commonly known as: FLONASE  1 spray by Each Nostril route daily as needed.     hydrALAZINE 100 MG tablet  Commonly known as: APRESOLINE  Take 1 tablet (100 mg total) by mouth 2 (two) times daily.     lancets Misc  Commonly known as: ACCU-CHEK SOFTCLIX LANCETS  Uses Accu-Chek Hector meter to test BG 2x/day     montelukast 10 mg tablet  Commonly known as: SINGULAIR  Take 1 tablet (10 mg total) by mouth once daily.     nitroGLYCERIN 0.4 MG SL tablet  Commonly known as: NITROSTAT  Place 0.4 mg under the tongue every 5 (five) minutes as needed for Chest pain.     omega-3 fatty acids 500 mg Cap  Take 1 capsule by mouth Daily.    "  OZEMPIC 0.25 mg or 0.5 mg (2 mg/3 mL) pen injector  Generic drug: semaglutide  Inject 0.25 mg into the skin every Wednesday.     pen needle, diabetic 31 gauge x 3/16" Ndle  Commonly known as: BD ULTRA-FINE MINI PEN NEEDLE  Use with insulin twice a day.     pravastatin 40 MG tablet  Commonly known as: PRAVACHOL  Take 1 tablet (40 mg total) by mouth once daily.     sevelamer carbonate 800 mg Tab  Commonly known as: RENVELA  Take 1 tablet (800 mg total) by mouth 3 (three) times daily.     TRULICITY 1.5 mg/0.5 mL pen injector  Generic drug: dulaglutide  Inject 1.5 mg into the skin every 7 days.     valsartan 160 MG tablet  Commonly known as: DIOVAN  Take 160 mg by mouth once daily.           * This list has 2 medication(s) that are the same as other medications prescribed for you. Read the directions carefully, and ask your doctor or other care provider to review them with you.                  Indwelling Lines/Drains at time of discharge:   Lines/Drains/Airways       None                   Time spent on the discharge of patient: 35 minutes         Danish Reyes MD  Department of Hospital Medicine  [facility]  "

## 2024-03-04 ENCOUNTER — HOSPITAL ENCOUNTER (INPATIENT)
Facility: HOSPITAL | Age: 84
LOS: 6 days | Discharge: HOME | DRG: 177 | End: 2024-03-11
Attending: EMERGENCY MEDICINE | Admitting: HOSPITALIST
Payer: MEDICARE

## 2024-03-04 DIAGNOSIS — R25.2 MUSCLE CRAMPING: ICD-10-CM

## 2024-03-04 DIAGNOSIS — E16.2 HYPOGLYCEMIA: ICD-10-CM

## 2024-03-04 DIAGNOSIS — N18.4 CKD (CHRONIC KIDNEY DISEASE) STAGE 4, GFR 15-29 ML/MIN: ICD-10-CM

## 2024-03-04 DIAGNOSIS — I50.43 ACUTE ON CHRONIC COMBINED SYSTOLIC AND DIASTOLIC HEART FAILURE: ICD-10-CM

## 2024-03-04 DIAGNOSIS — J18.9 HCAP (HEALTHCARE-ASSOCIATED PNEUMONIA): Primary | ICD-10-CM

## 2024-03-04 DIAGNOSIS — J18.9 COMMUNITY ACQUIRED PNEUMONIA: ICD-10-CM

## 2024-03-04 DIAGNOSIS — R79.89 ELEVATED TROPONIN: ICD-10-CM

## 2024-03-04 DIAGNOSIS — R07.9 CHEST PAIN: ICD-10-CM

## 2024-03-04 DIAGNOSIS — I25.5 CARDIOMYOPATHY, ISCHEMIC: ICD-10-CM

## 2024-03-04 DIAGNOSIS — J90 PLEURAL EFFUSION ON LEFT: ICD-10-CM

## 2024-03-04 DIAGNOSIS — Z99.2 ESRD ON PERITONEAL DIALYSIS: ICD-10-CM

## 2024-03-04 DIAGNOSIS — I50.42 CHRONIC COMBINED SYSTOLIC (CONGESTIVE) AND DIASTOLIC (CONGESTIVE) HEART FAILURE: ICD-10-CM

## 2024-03-04 DIAGNOSIS — Z79.4 CONTROLLED TYPE 2 DIABETES MELLITUS WITH BOTH EYES AFFECTED BY MILD NONPROLIFERATIVE RETINOPATHY AND MACULAR EDEMA, WITH LONG-TERM CURRENT USE OF INSULIN: ICD-10-CM

## 2024-03-04 DIAGNOSIS — E83.51 HYPOCALCEMIA: ICD-10-CM

## 2024-03-04 DIAGNOSIS — J18.9 CAP (COMMUNITY ACQUIRED PNEUMONIA): ICD-10-CM

## 2024-03-04 DIAGNOSIS — N18.6 ESRD ON PERITONEAL DIALYSIS: ICD-10-CM

## 2024-03-04 DIAGNOSIS — R07.9 CHEST PAIN, UNSPECIFIED TYPE: ICD-10-CM

## 2024-03-04 DIAGNOSIS — E11.3213 CONTROLLED TYPE 2 DIABETES MELLITUS WITH BOTH EYES AFFECTED BY MILD NONPROLIFERATIVE RETINOPATHY AND MACULAR EDEMA, WITH LONG-TERM CURRENT USE OF INSULIN: ICD-10-CM

## 2024-03-04 PROBLEM — R74.01 ALT (SGPT) LEVEL RAISED: Status: ACTIVE | Noted: 2024-03-04

## 2024-03-04 PROBLEM — R07.81 PLEURODYNIA: Status: ACTIVE | Noted: 2024-03-04

## 2024-03-04 LAB
ALBUMIN SERPL BCP-MCNC: 2.6 G/DL (ref 3.5–5.2)
ALP SERPL-CCNC: 133 U/L (ref 55–135)
ALT SERPL W/O P-5'-P-CCNC: 49 U/L (ref 10–44)
ANION GAP SERPL CALC-SCNC: 19 MMOL/L (ref 8–16)
APPEARANCE FLD: CLEAR
AST SERPL-CCNC: 38 U/L (ref 10–40)
BACTERIA SPEC AEROBE CULT: NO GROWTH
BASOPHILS # BLD AUTO: 0.04 K/UL (ref 0–0.2)
BASOPHILS NFR BLD: 0.3 % (ref 0–1.9)
BILIRUB SERPL-MCNC: 0.3 MG/DL (ref 0.1–1)
BNP SERPL-MCNC: 821 PG/ML (ref 0–99)
BODY FLD TYPE: NORMAL
BUN SERPL-MCNC: 98 MG/DL (ref 8–23)
CA-I BLDV-SCNC: 0.77 MMOL/L (ref 1.06–1.42)
CALCIUM SERPL-MCNC: 6.2 MG/DL (ref 8.7–10.5)
CHLORIDE SERPL-SCNC: 99 MMOL/L (ref 95–110)
CO2 SERPL-SCNC: 14 MMOL/L (ref 23–29)
COLOR FLD: COLORLESS
CREAT SERPL-MCNC: 5 MG/DL (ref 0.5–1.4)
DIFFERENTIAL METHOD BLD: ABNORMAL
EOSINOPHIL # BLD AUTO: 0.2 K/UL (ref 0–0.5)
EOSINOPHIL NFR BLD: 2 % (ref 0–8)
ERYTHROCYTE [DISTWIDTH] IN BLOOD BY AUTOMATED COUNT: 13.6 % (ref 11.5–14.5)
EST. GFR  (NO RACE VARIABLE): 8 ML/MIN/1.73 M^2
GLUCOSE SERPL-MCNC: 42 MG/DL (ref 70–110)
HBV SURFACE AB SER QL IA: POSITIVE
HBV SURFACE AB SERPL IA-ACNC: 213 MIU/ML
HCT VFR BLD AUTO: 23.9 % (ref 37–48.5)
HGB BLD-MCNC: 8.2 G/DL (ref 12–16)
IMM GRANULOCYTES # BLD AUTO: 0.13 K/UL (ref 0–0.04)
IMM GRANULOCYTES NFR BLD AUTO: 1.1 % (ref 0–0.5)
LACTATE SERPL-SCNC: 0.4 MMOL/L (ref 0.5–2.2)
LYMPHOCYTES # BLD AUTO: 0.8 K/UL (ref 1–4.8)
LYMPHOCYTES NFR BLD: 6.9 % (ref 18–48)
LYMPHOCYTES NFR FLD MANUAL: 25 %
MAGNESIUM SERPL-MCNC: 1.8 MG/DL (ref 1.6–2.6)
MCH RBC QN AUTO: 31.4 PG (ref 27–31)
MCHC RBC AUTO-ENTMCNC: 34.3 G/DL (ref 32–36)
MCV RBC AUTO: 92 FL (ref 82–98)
MESOTHL CELL NFR FLD MANUAL: 9 %
MONOCYTES # BLD AUTO: 0.6 K/UL (ref 0.3–1)
MONOCYTES NFR BLD: 4.9 % (ref 4–15)
MONOS+MACROS NFR FLD MANUAL: 57 %
NEUTROPHILS # BLD AUTO: 10.2 K/UL (ref 1.8–7.7)
NEUTROPHILS NFR BLD: 84.8 % (ref 38–73)
NEUTROPHILS NFR FLD MANUAL: 9 %
NRBC BLD-RTO: 0 /100 WBC
PHOSPHATE SERPL-MCNC: 7.5 MG/DL (ref 2.7–4.5)
PLATELET # BLD AUTO: 261 K/UL (ref 150–450)
PMV BLD AUTO: 10.5 FL (ref 9.2–12.9)
POCT GLUCOSE: 139 MG/DL (ref 70–110)
POCT GLUCOSE: 140 MG/DL (ref 70–110)
POCT GLUCOSE: 200 MG/DL (ref 70–110)
POTASSIUM SERPL-SCNC: 3.8 MMOL/L (ref 3.5–5.1)
PROCALCITONIN SERPL IA-MCNC: 0.27 NG/ML
PROT SERPL-MCNC: 6.4 G/DL (ref 6–8.4)
PTH-INTACT SERPL-MCNC: 541.6 PG/ML (ref 9–77)
RBC # BLD AUTO: 2.61 M/UL (ref 4–5.4)
SODIUM SERPL-SCNC: 132 MMOL/L (ref 136–145)
TROPONIN I SERPL DL<=0.01 NG/ML-MCNC: 0.03 NG/ML (ref 0–0.03)
TROPONIN I SERPL DL<=0.01 NG/ML-MCNC: 0.04 NG/ML (ref 0–0.03)
TROPONIN I SERPL DL<=0.01 NG/ML-MCNC: 0.05 NG/ML (ref 0–0.03)
WBC # BLD AUTO: 12.07 K/UL (ref 3.9–12.7)
WBC # FLD: 8 /CU MM

## 2024-03-04 PROCEDURE — 36415 COLL VENOUS BLD VENIPUNCTURE: CPT | Performed by: HOSPITALIST

## 2024-03-04 PROCEDURE — 80053 COMPREHEN METABOLIC PANEL: CPT | Performed by: EMERGENCY MEDICINE

## 2024-03-04 PROCEDURE — 99223 1ST HOSP IP/OBS HIGH 75: CPT | Mod: ,,, | Performed by: INTERNAL MEDICINE

## 2024-03-04 PROCEDURE — 89051 BODY FLUID CELL COUNT: CPT | Performed by: INTERNAL MEDICINE

## 2024-03-04 PROCEDURE — 94761 N-INVAS EAR/PLS OXIMETRY MLT: CPT

## 2024-03-04 PROCEDURE — 25000003 PHARM REV CODE 250: Performed by: HOSPITALIST

## 2024-03-04 PROCEDURE — 93010 ELECTROCARDIOGRAM REPORT: CPT | Mod: 76,,, | Performed by: INTERNAL MEDICINE

## 2024-03-04 PROCEDURE — 25000242 PHARM REV CODE 250 ALT 637 W/ HCPCS: Performed by: FAMILY MEDICINE

## 2024-03-04 PROCEDURE — 99900035 HC TECH TIME PER 15 MIN (STAT)

## 2024-03-04 PROCEDURE — 63600175 PHARM REV CODE 636 W HCPCS: Performed by: FAMILY MEDICINE

## 2024-03-04 PROCEDURE — 84100 ASSAY OF PHOSPHORUS: CPT | Performed by: EMERGENCY MEDICINE

## 2024-03-04 PROCEDURE — 97165 OT EVAL LOW COMPLEX 30 MIN: CPT

## 2024-03-04 PROCEDURE — 63600175 PHARM REV CODE 636 W HCPCS: Performed by: INTERNAL MEDICINE

## 2024-03-04 PROCEDURE — 25000003 PHARM REV CODE 250: Performed by: FAMILY MEDICINE

## 2024-03-04 PROCEDURE — 93005 ELECTROCARDIOGRAM TRACING: CPT

## 2024-03-04 PROCEDURE — 85025 COMPLETE CBC W/AUTO DIFF WBC: CPT | Performed by: EMERGENCY MEDICINE

## 2024-03-04 PROCEDURE — 83880 ASSAY OF NATRIURETIC PEPTIDE: CPT | Performed by: EMERGENCY MEDICINE

## 2024-03-04 PROCEDURE — 82962 GLUCOSE BLOOD TEST: CPT

## 2024-03-04 PROCEDURE — 25000003 PHARM REV CODE 250: Mod: JZ,JG | Performed by: EMERGENCY MEDICINE

## 2024-03-04 PROCEDURE — 3E1M39Z IRRIGATION OF PERITONEAL CAVITY USING DIALYSATE, PERCUTANEOUS APPROACH: ICD-10-PCS | Performed by: INTERNAL MEDICINE

## 2024-03-04 PROCEDURE — 63600175 PHARM REV CODE 636 W HCPCS: Performed by: EMERGENCY MEDICINE

## 2024-03-04 PROCEDURE — 87070 CULTURE OTHR SPECIMN AEROBIC: CPT | Performed by: INTERNAL MEDICINE

## 2024-03-04 PROCEDURE — 83970 ASSAY OF PARATHORMONE: CPT | Performed by: EMERGENCY MEDICINE

## 2024-03-04 PROCEDURE — G0378 HOSPITAL OBSERVATION PER HR: HCPCS

## 2024-03-04 PROCEDURE — 96375 TX/PRO/DX INJ NEW DRUG ADDON: CPT

## 2024-03-04 PROCEDURE — 90945 DIALYSIS ONE EVALUATION: CPT

## 2024-03-04 PROCEDURE — 27000221 HC OXYGEN, UP TO 24 HOURS

## 2024-03-04 PROCEDURE — 96372 THER/PROPH/DIAG INJ SC/IM: CPT | Performed by: HOSPITALIST

## 2024-03-04 PROCEDURE — 63600175 PHARM REV CODE 636 W HCPCS: Performed by: HOSPITALIST

## 2024-03-04 PROCEDURE — 96376 TX/PRO/DX INJ SAME DRUG ADON: CPT

## 2024-03-04 PROCEDURE — 99291 CRITICAL CARE FIRST HOUR: CPT

## 2024-03-04 PROCEDURE — 83605 ASSAY OF LACTIC ACID: CPT | Performed by: FAMILY MEDICINE

## 2024-03-04 PROCEDURE — 82330 ASSAY OF CALCIUM: CPT | Performed by: EMERGENCY MEDICINE

## 2024-03-04 PROCEDURE — 84484 ASSAY OF TROPONIN QUANT: CPT | Performed by: EMERGENCY MEDICINE

## 2024-03-04 PROCEDURE — 25000003 PHARM REV CODE 250: Performed by: INTERNAL MEDICINE

## 2024-03-04 PROCEDURE — 96365 THER/PROPH/DIAG IV INF INIT: CPT

## 2024-03-04 PROCEDURE — 84145 PROCALCITONIN (PCT): CPT | Performed by: FAMILY MEDICINE

## 2024-03-04 PROCEDURE — 87040 BLOOD CULTURE FOR BACTERIA: CPT | Performed by: HOSPITALIST

## 2024-03-04 PROCEDURE — 96372 THER/PROPH/DIAG INJ SC/IM: CPT | Performed by: FAMILY MEDICINE

## 2024-03-04 PROCEDURE — 96368 THER/DIAG CONCURRENT INF: CPT

## 2024-03-04 PROCEDURE — 84484 ASSAY OF TROPONIN QUANT: CPT | Mod: 91 | Performed by: HOSPITALIST

## 2024-03-04 PROCEDURE — 97162 PT EVAL MOD COMPLEX 30 MIN: CPT

## 2024-03-04 PROCEDURE — 83735 ASSAY OF MAGNESIUM: CPT | Performed by: EMERGENCY MEDICINE

## 2024-03-04 RX ORDER — ACETAMINOPHEN 325 MG/1
650 TABLET ORAL EVERY 4 HOURS PRN
Status: DISCONTINUED | OUTPATIENT
Start: 2024-03-04 | End: 2024-03-11 | Stop reason: HOSPADM

## 2024-03-04 RX ORDER — NAPROXEN SODIUM 220 MG/1
81 TABLET, FILM COATED ORAL DAILY
Status: DISCONTINUED | OUTPATIENT
Start: 2024-03-04 | End: 2024-03-11 | Stop reason: HOSPADM

## 2024-03-04 RX ORDER — MORPHINE SULFATE 2 MG/ML
2 INJECTION, SOLUTION INTRAMUSCULAR; INTRAVENOUS
Status: COMPLETED | OUTPATIENT
Start: 2024-03-04 | End: 2024-03-04

## 2024-03-04 RX ORDER — AZITHROMYCIN 250 MG/1
500 TABLET, FILM COATED ORAL DAILY
Status: DISCONTINUED | OUTPATIENT
Start: 2024-03-05 | End: 2024-03-07

## 2024-03-04 RX ORDER — HYDROCODONE BITARTRATE AND ACETAMINOPHEN 5; 325 MG/1; MG/1
1 TABLET ORAL EVERY 6 HOURS PRN
Status: DISCONTINUED | OUTPATIENT
Start: 2024-03-04 | End: 2024-03-05

## 2024-03-04 RX ORDER — SEVELAMER CARBONATE 800 MG/1
800 TABLET, FILM COATED ORAL 3 TIMES DAILY
Status: DISCONTINUED | OUTPATIENT
Start: 2024-03-04 | End: 2024-03-05

## 2024-03-04 RX ORDER — FLUTICASONE PROPIONATE 50 MCG
1 SPRAY, SUSPENSION (ML) NASAL DAILY PRN
Status: DISCONTINUED | OUTPATIENT
Start: 2024-03-04 | End: 2024-03-11 | Stop reason: HOSPADM

## 2024-03-04 RX ORDER — SODIUM BICARBONATE 650 MG/1
650 TABLET ORAL 4 TIMES DAILY
Status: DISCONTINUED | OUTPATIENT
Start: 2024-03-04 | End: 2024-03-11 | Stop reason: HOSPADM

## 2024-03-04 RX ORDER — IBUPROFEN 200 MG
24 TABLET ORAL
Status: DISCONTINUED | OUTPATIENT
Start: 2024-03-04 | End: 2024-03-11 | Stop reason: HOSPADM

## 2024-03-04 RX ORDER — IBUPROFEN 200 MG
16 TABLET ORAL
Status: DISCONTINUED | OUTPATIENT
Start: 2024-03-04 | End: 2024-03-11 | Stop reason: HOSPADM

## 2024-03-04 RX ORDER — CARVEDILOL 25 MG/1
25 TABLET ORAL 2 TIMES DAILY
Status: DISCONTINUED | OUTPATIENT
Start: 2024-03-04 | End: 2024-03-05

## 2024-03-04 RX ORDER — GLUCAGON 1 MG
1 KIT INJECTION
Status: DISCONTINUED | OUTPATIENT
Start: 2024-03-04 | End: 2024-03-11 | Stop reason: HOSPADM

## 2024-03-04 RX ORDER — NALOXONE HCL 0.4 MG/ML
0.02 VIAL (ML) INJECTION
Status: DISCONTINUED | OUTPATIENT
Start: 2024-03-04 | End: 2024-03-11 | Stop reason: HOSPADM

## 2024-03-04 RX ORDER — SODIUM BICARBONATE 650 MG/1
650 TABLET ORAL 3 TIMES DAILY
Status: DISCONTINUED | OUTPATIENT
Start: 2024-03-04 | End: 2024-03-04

## 2024-03-04 RX ORDER — PRAVASTATIN SODIUM 40 MG/1
40 TABLET ORAL DAILY
Status: DISCONTINUED | OUTPATIENT
Start: 2024-03-04 | End: 2024-03-08

## 2024-03-04 RX ORDER — ACETAMINOPHEN 325 MG/1
650 TABLET ORAL 3 TIMES DAILY
Status: DISCONTINUED | OUTPATIENT
Start: 2024-03-04 | End: 2024-03-05

## 2024-03-04 RX ORDER — LIDOCAINE HYDROCHLORIDE 20 MG/ML
15 SOLUTION OROPHARYNGEAL ONCE
Status: DISCONTINUED | OUTPATIENT
Start: 2024-03-04 | End: 2024-03-11 | Stop reason: HOSPADM

## 2024-03-04 RX ORDER — IPRATROPIUM BROMIDE AND ALBUTEROL SULFATE 2.5; .5 MG/3ML; MG/3ML
3 SOLUTION RESPIRATORY (INHALATION) EVERY 4 HOURS PRN
Status: DISCONTINUED | OUTPATIENT
Start: 2024-03-04 | End: 2024-03-11 | Stop reason: HOSPADM

## 2024-03-04 RX ORDER — SODIUM CHLORIDE 0.9 % (FLUSH) 0.9 %
10 SYRINGE (ML) INJECTION EVERY 12 HOURS PRN
Status: DISCONTINUED | OUTPATIENT
Start: 2024-03-04 | End: 2024-03-11 | Stop reason: HOSPADM

## 2024-03-04 RX ORDER — MORPHINE SULFATE 2 MG/ML
1 INJECTION, SOLUTION INTRAMUSCULAR; INTRAVENOUS
Status: DISCONTINUED | OUTPATIENT
Start: 2024-03-04 | End: 2024-03-04

## 2024-03-04 RX ORDER — ALUMINUM HYDROXIDE, MAGNESIUM HYDROXIDE, AND SIMETHICONE 1200; 120; 1200 MG/30ML; MG/30ML; MG/30ML
30 SUSPENSION ORAL ONCE
Status: DISCONTINUED | OUTPATIENT
Start: 2024-03-04 | End: 2024-03-08

## 2024-03-04 RX ORDER — PNV NO.95/FERROUS FUM/FOLIC AC 28MG-0.8MG
100 TABLET ORAL DAILY
Status: DISCONTINUED | OUTPATIENT
Start: 2024-03-04 | End: 2024-03-11 | Stop reason: HOSPADM

## 2024-03-04 RX ORDER — FUROSEMIDE 10 MG/ML
20 INJECTION INTRAMUSCULAR; INTRAVENOUS EVERY 12 HOURS
Status: DISCONTINUED | OUTPATIENT
Start: 2024-03-04 | End: 2024-03-04

## 2024-03-04 RX ORDER — HEPARIN SODIUM 5000 [USP'U]/ML
5000 INJECTION, SOLUTION INTRAVENOUS; SUBCUTANEOUS EVERY 8 HOURS
Status: DISCONTINUED | OUTPATIENT
Start: 2024-03-04 | End: 2024-03-11 | Stop reason: HOSPADM

## 2024-03-04 RX ORDER — HYDRALAZINE HYDROCHLORIDE 25 MG/1
100 TABLET, FILM COATED ORAL 2 TIMES DAILY
Status: DISCONTINUED | OUTPATIENT
Start: 2024-03-04 | End: 2024-03-05

## 2024-03-04 RX ORDER — ALBUTEROL SULFATE 90 UG/1
2 AEROSOL, METERED RESPIRATORY (INHALATION) EVERY 6 HOURS PRN
Status: DISCONTINUED | OUTPATIENT
Start: 2024-03-04 | End: 2024-03-11 | Stop reason: HOSPADM

## 2024-03-04 RX ORDER — NITROGLYCERIN 0.4 MG/1
0.4 TABLET SUBLINGUAL EVERY 5 MIN PRN
Status: DISCONTINUED | OUTPATIENT
Start: 2024-03-04 | End: 2024-03-11 | Stop reason: HOSPADM

## 2024-03-04 RX ORDER — FUROSEMIDE 10 MG/ML
60 INJECTION INTRAMUSCULAR; INTRAVENOUS EVERY 12 HOURS
Status: DISCONTINUED | OUTPATIENT
Start: 2024-03-04 | End: 2024-03-06

## 2024-03-04 RX ORDER — INSULIN ASPART 100 [IU]/ML
0-5 INJECTION, SOLUTION INTRAVENOUS; SUBCUTANEOUS
Status: DISCONTINUED | OUTPATIENT
Start: 2024-03-04 | End: 2024-03-11 | Stop reason: HOSPADM

## 2024-03-04 RX ORDER — CHOLECALCIFEROL (VITAMIN D3) 25 MCG
2000 TABLET ORAL DAILY
Status: DISCONTINUED | OUTPATIENT
Start: 2024-03-04 | End: 2024-03-11 | Stop reason: HOSPADM

## 2024-03-04 RX ORDER — VALSARTAN 160 MG/1
160 TABLET ORAL DAILY
Status: DISCONTINUED | OUTPATIENT
Start: 2024-03-04 | End: 2024-03-05

## 2024-03-04 RX ORDER — CALCIUM GLUCONATE 20 MG/ML
1 INJECTION, SOLUTION INTRAVENOUS ONCE
Status: COMPLETED | OUTPATIENT
Start: 2024-03-04 | End: 2024-03-04

## 2024-03-04 RX ADMIN — ACETAMINOPHEN 650 MG: 325 TABLET ORAL at 02:03

## 2024-03-04 RX ADMIN — FUROSEMIDE 60 MG: 10 INJECTION, SOLUTION INTRAMUSCULAR; INTRAVENOUS at 08:03

## 2024-03-04 RX ADMIN — HEPARIN SODIUM 5000 UNITS: 5000 INJECTION INTRAVENOUS; SUBCUTANEOUS at 09:03

## 2024-03-04 RX ADMIN — HYDRALAZINE HYDROCHLORIDE 100 MG: 25 TABLET, FILM COATED ORAL at 08:03

## 2024-03-04 RX ADMIN — HYDRALAZINE HYDROCHLORIDE 100 MG: 25 TABLET, FILM COATED ORAL at 11:03

## 2024-03-04 RX ADMIN — HEPARIN SODIUM 5000 UNITS: 5000 INJECTION INTRAVENOUS; SUBCUTANEOUS at 02:03

## 2024-03-04 RX ADMIN — SODIUM BICARBONATE 650 MG: 650 TABLET ORAL at 06:03

## 2024-03-04 RX ADMIN — SODIUM BICARBONATE 650 MG: 650 TABLET ORAL at 08:03

## 2024-03-04 RX ADMIN — PIPERACILLIN AND TAZOBACTAM 4.5 G: 4; .5 INJECTION, POWDER, LYOPHILIZED, FOR SOLUTION INTRAVENOUS; PARENTERAL at 08:03

## 2024-03-04 RX ADMIN — CARVEDILOL 25 MG: 25 TABLET, FILM COATED ORAL at 08:03

## 2024-03-04 RX ADMIN — CALCIUM GLUCONATE 1 G: 20 INJECTION, SOLUTION INTRAVENOUS at 06:03

## 2024-03-04 RX ADMIN — NITROGLYCERIN 0.4 MG: 0.4 TABLET, ORALLY DISINTEGRATING SUBLINGUAL at 06:03

## 2024-03-04 RX ADMIN — HYDROCODONE BITARTRATE AND ACETAMINOPHEN 1 TABLET: 5; 325 TABLET ORAL at 06:03

## 2024-03-04 RX ADMIN — PIPERACILLIN AND TAZOBACTAM 4.5 G: 4; .5 INJECTION, POWDER, LYOPHILIZED, FOR SOLUTION INTRAVENOUS; PARENTERAL at 09:03

## 2024-03-04 RX ADMIN — HYDROCODONE BITARTRATE AND ACETAMINOPHEN 1 TABLET: 5; 325 TABLET ORAL at 11:03

## 2024-03-04 RX ADMIN — Medication 100 MCG: at 12:03

## 2024-03-04 RX ADMIN — SODIUM BICARBONATE 650 MG: 650 TABLET ORAL at 12:03

## 2024-03-04 RX ADMIN — INSULIN DETEMIR 6 UNITS: 100 INJECTION, SOLUTION SUBCUTANEOUS at 10:03

## 2024-03-04 RX ADMIN — PRAVASTATIN SODIUM 40 MG: 40 TABLET ORAL at 09:03

## 2024-03-04 RX ADMIN — DEXTROSE 250 ML: 10 SOLUTION INTRAVENOUS at 05:03

## 2024-03-04 RX ADMIN — SEVELAMER CARBONATE 800 MG: 800 TABLET, FILM COATED ORAL at 08:03

## 2024-03-04 RX ADMIN — CHOLECALCIFEROL TAB 25 MCG (1000 UNIT) 2000 UNITS: 25 TAB at 08:03

## 2024-03-04 RX ADMIN — MORPHINE SULFATE 2 MG: 2 INJECTION, SOLUTION INTRAMUSCULAR; INTRAVENOUS at 04:03

## 2024-03-04 RX ADMIN — VALSARTAN 160 MG: 160 TABLET, FILM COATED ORAL at 11:03

## 2024-03-04 RX ADMIN — ASPIRIN 81 MG CHEWABLE TABLET 81 MG: 81 TABLET CHEWABLE at 08:03

## 2024-03-04 RX ADMIN — SEVELAMER CARBONATE 800 MG: 800 TABLET, FILM COATED ORAL at 02:03

## 2024-03-04 RX ADMIN — ACETAMINOPHEN 650 MG: 325 TABLET ORAL at 08:03

## 2024-03-04 RX ADMIN — SODIUM BICARBONATE 650 MG TABLET 650 MG: at 08:03

## 2024-03-04 RX ADMIN — MORPHINE SULFATE 1 MG: 2 INJECTION, SOLUTION INTRAMUSCULAR; INTRAVENOUS at 07:03

## 2024-03-04 RX ADMIN — AZITHROMYCIN MONOHYDRATE 500 MG: 500 INJECTION, POWDER, LYOPHILIZED, FOR SOLUTION INTRAVENOUS at 06:03

## 2024-03-04 RX ADMIN — FUROSEMIDE 60 MG: 10 INJECTION, SOLUTION INTRAMUSCULAR; INTRAVENOUS at 11:03

## 2024-03-04 NOTE — PROGRESS NOTES
03/04/24 1140   Peritoneal Dialysis   Exchange Type Cycler   Peritoneal Treatment Status Started   Dianeal Solution Dextrose 1.5% in 6000 mL;Dextrose 1.5% in 2000 mL   Cycler Peritoneal Dialysis   Number of Cycles 3   Fill Volume (mL) 2500 mL   Total Volume (mL) 8000 mL   Dwell Time (specify hr/min) 1.27   Cycler Treatment Comments Initiated without complications. I-drain into first fill observed. Dressing changed. No s/s of infection noted. Family and patient allowed time for questions. All agreeable to POC.

## 2024-03-04 NOTE — PLAN OF CARE
03/04/24 1009   Nurse Notification   Charge Nurse Notified? Yes   Name of Charge Nurse kassandra   Bedside Nurse Notified? Yes   Name of Bedside Nurse meena mckeon (virtual nurse)   Nurse Notfication Method Secure Chat   Provider Notification   Provider Notified? Yes   Name of Provider dr. reilly   Provider Notification Method Secure Chat   Provider Notified Of AI Deterioration Alert

## 2024-03-04 NOTE — ASSESSMENT & PLAN NOTE
- initial /70; trended down to 100s overnight  - on Coreg, Diovan Losartan and Hydralazine at home doses  - goal BP less than 130/80  - BP well controlled; continue medications and monitor BP

## 2024-03-04 NOTE — ASSESSMENT & PLAN NOTE
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 Will replace calcium and monitor electrolytes closely. The latest calcium labs have been reviewed and are listed below.  Recent Labs   Lab 03/04/24  0430   CALCIUM 6.2*       Recent Labs   Lab 03/04/24  0550   CAION 0.77*     Calcium supplement

## 2024-03-04 NOTE — ED NOTES
Glucose 42. Pt given apple juice and PRN glucose. Pt drowsy but easy to arouse and alert when awake.

## 2024-03-04 NOTE — PLAN OF CARE
SW met with pt and pts family at bedside. Pt recently dc from hospital. All information confirmed from previous admission. All information confirmed on chart. Pt resides in home alone. Pt is independent in ADLs. Pt has no hh services at home. Pt has wheelchair, rw, rollator, and cane at home. Pt uses cane to get around. Pts family will provide transport home. Pt administers her own PD at home. Pts [facility] D[facility]. SW will continue to follow pt throughout her transitions of care and assist with any dc needs. Upon dc pt will first dc to her daughter and son in law home before returning home.       Future Appointments   Date Time Provider Department Center   3/14/2024  9:30 AM CARRIE Arechiga MD [facility]   4/10/2024  3:30 PM Kelvin Maki MD [facility] PULMSVC [facility]   4/17/2024  9:00 AM Eric Brooke PA-C [facility] UROGYN [facility]   4/24/2024 10:40 AM Dayton Michael MD [facility] FAM MED [facility]   6/5/2024 12:15 PM EKG, APPT NOM EKG [facility]   6/5/2024 12:40 PM COORDINATED DEVICE CHECK [facility] ARRHPRO [facility]   6/5/2024  1:30 PM Celeste Rogers NP [facility] ARRHYTH [facility]        03/04/24 0953   Discharge Assessment   Assessment Type Discharge Planning Assessment   Confirmed/corrected address, phone number and insurance Yes   Confirmed Demographics Correct on Facesheet   Source of Information patient;family   Communicated ALIX with patient/caregiver Yes   Reason For Admission HCAP (healthcare-associated pneumonia)   People in Home alone   Do you expect to return to your current living situation? Yes   Do you have help at home or someone to help you manage your care at home? Yes   Who are your caregiver(s) and their phone number(s)? Betsy Bowen (Daughter) [phone]   Prior to hospitilization cognitive status: Alert/Oriented   Current cognitive status: Alert/Oriented   Home Layout Able to live on 1st floor   Equipment Currently Used at Home wheelchair;walker,  rolling;rollator;cane, straight   Do you currently have service(s) that help you manage your care at home? No   Do you take prescription medications? Yes   Do you have prescription coverage? Yes   Coverage Payor: HU[employer] MANAGED MEDICARE - HU[employer] MEDICARE HMO   Do you have any problems affording any of your prescribed medications? No   Is the patient taking medications as prescribed? yes   Who is going to help you get home at discharge? Betsy Bowen (Daughter) [phone]   How do you get to doctors appointments? family or friend will provide   Are you on dialysis? Yes   Dialysis Name and Scheduled days PD at home [facility]   Discharge Plan A Home with family   DME Needed Upon Discharge  none   Discharge Plan discussed with: Patient   Transition of Care Barriers None   Alcohol Use   Q1: How often do you have a drink containing alcohol? Never   Q2: How many drinks containing alcohol do you have on a typical day when you are drinking? None   Q3: How often do you have six or more drinks on one occasion? Never

## 2024-03-04 NOTE — PROGRESS NOTES
Pharmacist Renal Dose Adjustment Note    Myesha Quinones is a 83 y.o. female being treated with the medication zosyn    Patient Data:    Vital Signs (Most Recent):  Temp: 97.5 °F (36.4 °C) (03/04/24 0600)  Pulse: 70 (03/04/24 0600)  Resp: (!) 25 (03/04/24 0600)  BP: (!) 169/70 (03/04/24 0600)  SpO2: 97 % (03/04/24 0600) Vital Signs (72h Range):  Temp:  [97.5 °F (36.4 °C)-99.7 °F (37.6 °C)]   Pulse:  [66-70]   Resp:  [16-25]   BP: (127-169)/(58-70)   SpO2:  [93 %-97 %]      Recent Labs   Lab 03/01/24  0606 03/02/24  0607 03/04/24  0430   CREATININE 4.7*  4.7* 4.4* 5.0*     Serum creatinine: 5 mg/dL (H) 03/04/24 0430  Estimated creatinine clearance: 7.6 mL/min (A)    Medication:zosyn dose: 3.375g frequency q8 will be changed to medication:zosyn dose:4.5g frequency:q12    Pharmacist's Name: Scott Guy  Pharmacist's Extension: [phone]

## 2024-03-04 NOTE — NURSING
Arrived from ED via bed AAOx4 C/O CP 10/10 non-radiating. No other symptoms. Was evaluated in ED and given Morphine. Dr. Morocho came to bedside shortly and did verbal assessment. Patient started drifting off asllep. She and family said they were told pain is related to Pneumonia and not heart. Tele was viewed and showed Pacing 100% @ 70

## 2024-03-04 NOTE — ASSESSMENT & PLAN NOTE
Creatine stable for now. BMP reviewed- noted Estimated Creatinine Clearance: 7.6 mL/min (A) (based on SCr of 5 mg/dL (H)). according to latest data. 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  Monitor UOP and serial BMP and adjust therapy as needed. Renally dose meds. Avoid nephrotoxic medications and procedures.

## 2024-03-04 NOTE — PLAN OF CARE
03/04/24 1310   Admission   Initial VN Admission Questions Complete   Communication Issues? None  (Admission assessment data collection completed with assistance from sina Sierra via hospital room phone)

## 2024-03-04 NOTE — ASSESSMENT & PLAN NOTE
Patient with known CAD s/p  pacemaker and stent placement, which is controlled Will continue ASA and monitor for S/Sx of angina/ACS. Continue to monitor on telemetry.

## 2024-03-04 NOTE — H&P
[facility]  [facility] Medicine  History & Physical    Patient Name: Myesha Quinones  MRN: [id number]  Patient Class: OP- Observation  Admission Date: 3/4/2024  Attending Physician: Danish Reyes,*   Primary Care Provider: Dayton Michael MD         Patient information was obtained from patient, relative(s), and ER records.     Subjective:     Principal Problem:HCAP (healthcare-associated pneumonia)    Chief Complaint:   Chief Complaint   Patient presents with    Chest Pain     Pt arrives from home c/o left side substernal chest pain radiating to the back starting several hours ago. Pt recently d/c'd for pneumonia.         HPI:   83-year-old woman with ESRD on PD, type 2 diabetes, hypertension, asthma/COPD, HLD, CAD breast CA presented to the ER for notion of CP. Patient was jus recently admitted to the hospital for metabolic encephalopathy and discharge one day ago.  Per family since discharge she was not herself and had multiple complaints, with difficulty sleeping, decrease appetite, fatigue, generalized weakness. The night of the admission she wake up with left sided chest pain, type pressure, radiating to her back, aggravated by deep breathing, no relieving factors, associated with SOB. At home he BS was high around 250 per family. She was given 15 units of lantus. She was brought to ED. In the ED she was found to have hypoglycemia, as well as elevated BP and decreased oxygen saturation. When seen in the ER, she was moaning with severe CP, in mild to moderate distress, O2 sat aroung 96% on 3L oxygen. Labs in the ER with elevated troponin 0.034, , H/H 8.2/23.9, low calcium,elevated ALT 49, high PTH intact 541.6, low sodium 132. CXR There is bilateral pattern of interstitial and patchy alveolar infiltrates which has been progressing compare to previous admission. She has been admitted for HCAP and R/O ACS.    Past Medical History:   Diagnosis Date    Acute encephalopathy 2/29/2024     Acute hypoxemic respiratory failure 12/19/2019    Acute right-sided thoracic back pain 12/09/2019    Allergy     Asthma     Basal cell carcinoma     left forehead    Basal cell carcinoma     left nose    Basal cell carcinoma 05/20/2015    right nose    Basal cell carcinoma 12/22/2015    left lower post neck    Basal cell carcinoma 12/03/2019    left ant scalp     BCC (basal cell carcinoma of skin) 10/20/2022    Right alar groove    Bilateral pleural effusion     Bilateral renal cysts     Breast cancer     CAD (coronary artery disease)     Cardiomyopathy     Cardiomyopathy, ischemic     Cataract     CHF (congestive heart failure)     CKD (chronic kidney disease) stage 4, GFR 15-29 ml/min     Colon polyp 2011    Controlled type 2 diabetes mellitus with both eyes affected by mild nonproliferative retinopathy and macular edema, with long-term current use of insulin 02/22/2018    COPD (chronic obstructive pulmonary disease)     COPD exacerbation 04/08/2018    Current mild episode of major depressive disorder without prior episode 01/25/2022    Defibrillator discharge     Dependence on renal dialysis 08/22/2023    Diabetes mellitus     Diabetes mellitus type II     Diabetes with neurologic complications     Edema     ESRD needing dialysis     Goiter     MNG    Hematuria, unspecified     HX: breast cancer     Hyperlipidemia     Hypertension     Hypocalcemia     Hypokalemia     Hyponatremia     Hyponatremia 04/02/2022    Iron deficiency anemia 05/16/2017    Iron deficiency anemia     Iron deficiency anemia     Left kidney mass     Meningioma     Microalbuminuria due to type 2 diabetes mellitus 01/26/2022    Osteoporosis, postmenopausal     Pneumonia 12/08/2019    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    Proteinuria 01/21/2019    Pseudomonas pneumonia     SCC (squamous cell carcinoma) 08/25/2022    right posterior neck    Skin cancer     s/p excision    Sleep apnea     CPAP    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/2015     mid forehead    Unspecified vitamin D deficiency     UTI (urinary tract infection) 04/02/2022    Ventricular tachycardia     Vitamin B12 deficiency     Vitamin D deficiency disease        Past Surgical History:   Procedure Laterality Date    BASAL CELL CARCINOMA EXCISION      posterior neck and nose    BREAST BIOPSY      BREAST CYST EXCISION Left     BREAST SURGERY      CARDIAC DEFIBRILLATOR PLACEMENT      x 2    CATARACT EXTRACTION W/  INTRAOCULAR LENS IMPLANT Bilateral     CHOLECYSTECTOMY      COLONOSCOPY N/A 11/5/2019    Procedure: COLONOSCOPY;  Surgeon: Boaz Botello MD;  Location: [facility] ([address] [place] [address]);  Service: Endoscopy;  Laterality: N/A;  [facility] [employer] -     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    FRACTURE SURGERY      left elbow and wrist as a child    HYSTERECTOMY      INSERTION, CATHETER, DIALYSIS, PERITONEAL, LAPAROSCOPIC N/A 4/25/2023    Procedure: INSERTION, CATHETER, DIALYSIS, PERITONEAL, LAPAROSCOPIC;  Surgeon: Marcelo Isaac MD;  Location: [facility];  Service: General;  Laterality: N/A;    LAPAROSCOPIC LYSIS OF ADHESIONS N/A 4/25/2023    Procedure: LYSIS, ADHESIONS, LAPAROSCOPIC;  Surgeon: Marcelo Isaac MD;  Location: [facility];  Service: General;  Laterality: N/A;    MASTECTOMY Right     OMENTOPEXY, LAPAROSCOPIC N/A 4/25/2023    Procedure: OMENTOPEXY, LAPAROSCOPIC;  Surgeon: Marcelo Isaac MD;  Location: [facility] OR;  Service: General;  Laterality: N/A;    REPLACEMENT OF IMPLANTABLE CARDIOVERTER-DEFIBRILLATOR (ICD) GENERATOR N/A 12/17/2018    Procedure: REPLACEMENT, ICD GENERATOR;  Surgeon: Jan Mckeon MD;  Location: [facility] EP LAB;  Service: Cardiology;  Laterality: N/A;  DONNA, CRT-D gen change, MDT, MAC, SK, 3 Prep    REVISION OF SKIN POCKET FOR CARDIOVERTER-DEFIBRILLATOR  12/17/2018    Procedure: Revision, Skin Pocket, For Cardioverter-Defibrillator;  Surgeon: Jan Mckeon MD;  Location: [facility] EP LAB;  Service: Cardiology;;    SQUAMOUS CELL CARCINOMA EXCISION      remved  from forehead    TONSILLECTOMY         Review of patient's allergies indicates:   Allergen Reactions    Iodine and iodide containing products Hives and Other (See Comments)     Not specified     Nifedipine      weakness       No current facility-administered medications on file prior to encounter.     Current Outpatient Medications on File Prior to Encounter   Medication Sig    acetaminophen (TYLENOL) 500 MG tablet Take 500 mg by mouth as needed.    albuterol (PROVENTIL/VENTOLIN HFA) 90 mcg/actuation inhaler Inhale 2 puffs into the lungs every 6 (six) hours as needed for Wheezing. Rescue    albuterol-ipratropium (DUO-NEB) 2.5 mg-0.5 mg/3 mL nebulizer solution Take 3 mLs by nebulization every 4 (four) hours as needed for Wheezing.    amoxicillin-clavulanate 250-125mg (AUGMENTIN) 250-125 mg Tab Take 1 tablet by mouth every 12 (twelve) hours. for 2 days    aspirin 81 MG Chew Take 81 mg by mouth once daily.    blood sugar diagnostic (ACCU-CHEK HECTOR) Strp Uses Accu-Check Hector meter to test BG 4x/day    calcitRIOL (ROCALTROL) 0.5 MCG Cap Take 1 capsule (0.5 mcg total) by mouth once daily.    carvediloL (COREG) 25 MG tablet TAKE 2 TABLETS (50 MG TOTAL) BY MOUTH 2 (TWO) TIMES DAILY.    cholecalciferol, vitamin D3, (VITAMIN D3) 50 mcg (2,000 unit) Tab Take 1 tablet by mouth once daily.     cyanocobalamin (VITAMIN B-12) 100 MCG tablet Take 100 mcg by mouth once daily.    dulaglutide (TRULICITY) 1.5 mg/0.5 mL pen injector Inject 1.5 mg into the skin every 7 days.    epoetin [place]-epbx (RETACRIT) 10,000 unit/mL imjection Inject 1 mL (10,000 Units total) into the skin every 30 days.    ergocalciferol (ERGOCALCIFEROL) 50,000 unit Cap Take 50,000 Units by mouth every 7 days.    estradioL (ESTRACE) 0.01 % (0.1 mg/gram) vaginal cream PLACE 1 GRAM VAGINALLY EVERY OTHER DAY    estradioL (ESTRING) 2 mg (7.5 mcg /24 hour) vaginal ring Place 2 mg vaginally every 3 (three) months.    fluticasone propionate (FLONASE) 50 mcg/actuation  "nasal spray 1 spray by Each Nostril route daily as needed.    furosemide (LASIX) 40 MG tablet Take 1 tablet (40 mg total) by mouth once daily.    hydrALAZINE (APRESOLINE) 100 MG tablet Take 1 tablet (100 mg total) by mouth 2 (two) times daily.    insulin (LANTUS SOLOSTAR U-100 INSULIN) glargine 100 units/mL SubQ pen Inject 12 Units into the skin every evening.    lancets (ACCU-CHEK SOFTCLIX LANCETS) Misc Uses Accu-Chek Angelica meter to test BG 2x/day    montelukast (SINGULAIR) 10 mg tablet Take 1 tablet (10 mg total) by mouth once daily.    nitroGLYCERIN (NITROSTAT) 0.4 MG SL tablet Place 0.4 mg under the tongue every 5 (five) minutes as needed for Chest pain.     omega-3 fatty acids 500 mg Cap Take 1 capsule by mouth Daily.    OZEMPIC 0.25 mg or 0.5 mg (2 mg/3 mL) pen injector Inject 0.25 mg into the skin every Wednesday.    pen needle, diabetic (BD ULTRA-FINE MINI PEN NEEDLE) 31 gauge x 3/16" Ndle Use with insulin twice a day.    pravastatin (PRAVACHOL) 40 MG tablet Take 1 tablet (40 mg total) by mouth once daily.    sevelamer carbonate (RENVELA) 800 mg Tab Take 1 tablet (800 mg total) by mouth 3 (three) times daily.    sodium bicarbonate 650 MG tablet Take 1 tablet (650 mg total) by mouth 3 (three) times daily.    valsartan (DIOVAN) 160 MG tablet Take 160 mg by mouth once daily.     Family History       Problem Relation (Age of Onset)    Asthma Mother    Cancer Brother, Daughter, Son    Cardiomyopathy Father    Cerebral aneurysm Brother    Diabetes Father, Sister, Brother, Brother, Brother, Brother, Daughter, Son, Son    Heart attack Sister, Brother    Heart disease Sister, Brother, Brother, Brother    Hypertension Mother, Brother, Brother    Melanoma Daughter    No Known Problems Maternal Grandmother, Maternal Grandfather, Paternal Grandmother, Paternal Grandfather    Obesity Daughter    Stroke Mother          Tobacco Use    Smoking status: Never     Passive exposure: Never    Smokeless tobacco: Never   Substance " and Sexual Activity    Alcohol use: No     Alcohol/week: 0.0 standard drinks of alcohol    Drug use: No    Sexual activity: Yes     Partners: Male     Review of Systems   Constitutional:  Positive for activity change, appetite change and fatigue. Negative for diaphoresis and fever.   HENT:  Positive for hearing loss.    Respiratory:  Positive for chest tightness and shortness of breath. Negative for cough and wheezing.    Cardiovascular:  Positive for chest pain. Negative for palpitations and leg swelling.   Gastrointestinal:  Negative for abdominal pain, constipation, diarrhea, nausea and vomiting.   Musculoskeletal:  Positive for gait problem.   Skin:  Negative for rash and wound.   Neurological:  Positive for weakness. Negative for dizziness, speech difficulty and headaches.   Psychiatric/Behavioral:  Negative for agitation, confusion and hallucinations.    All other systems reviewed and are negative.    Objective:     Vital Signs (Most Recent):  Temp: 97.5 °F (36.4 °C) (03/04/24 0600)  Pulse: 70 (03/04/24 0600)  Resp: (!) 25 (03/04/24 0600)  BP: (!) 169/70 (03/04/24 0600)  SpO2: 97 % (03/04/24 0600) Vital Signs (24h Range):  Temp:  [97.5 °F (36.4 °C)-99.7 °F (37.6 °C)] 97.5 °F (36.4 °C)  Pulse:  [66-70] 70  Resp:  [16-25] 25  SpO2:  [93 %-97 %] 97 %  BP: (127-169)/(58-70) 169/70     Weight: 65.8 kg (145 lb)  Body mass index is 25.69 kg/m².     Physical Exam  Vitals and nursing note reviewed.   Constitutional:       General: She is in acute distress.      Appearance: She is ill-appearing. She is not toxic-appearing.   HENT:      Head: Normocephalic and atraumatic.      Nose: Congestion present.      Mouth/Throat:      Mouth: Mucous membranes are moist.      Pharynx: No oropharyngeal exudate.   Eyes:      Extraocular Movements: Extraocular movements intact.      Pupils: Pupils are equal, round, and reactive to light.   Cardiovascular:      Rate and Rhythm: Normal rate.      Heart sounds: No murmur heard.     No  "friction rub. No gallop.   Pulmonary:      Effort: Pulmonary effort is normal. No respiratory distress.      Breath sounds: No wheezing or rales.   Chest:      Chest wall: Tenderness present.   Abdominal:      General: Bowel sounds are normal. There is no distension.      Palpations: Abdomen is soft.      Tenderness: There is no abdominal tenderness. There is no guarding or rebound.   Musculoskeletal:         General: No swelling or tenderness.      Cervical back: Neck supple. No rigidity or tenderness.      Right lower leg: No edema.      Left lower leg: No edema.   Skin:     Findings: No erythema or rash.   Neurological:      General: No focal deficit present.      Mental Status: She is alert and oriented to person, place, and time. Mental status is at baseline.      Motor: Weakness present.      Coordination: Coordination normal.      Gait: Gait abnormal.   Psychiatric:         Thought Content: Thought content normal.              CRANIAL NERVES     CN III, IV, VI   Pupils are equal, round, and reactive to light.       Significant Labs: All pertinent labs within the past 24 hours have been reviewed.  A1C:   Recent Labs   Lab 10/24/23  1130 02/29/24  1159   HGBA1C 4.9 6.5*     Blood Culture: No results for input(s): "LABBLOO" in the last 48 hours.  BMP:   Recent Labs   Lab 03/04/24  0430 03/04/24  0550   GLU 42*  --    *  --    K 3.8  --    CL 99  --    CO2 14*  --    BUN 98*  --    CREATININE 5.0*  --    CALCIUM 6.2*  --    MG  --  1.8     CBC:   Recent Labs   Lab 03/04/24  0430   WBC 12.07   HGB 8.2*   HCT 23.9*        CMP:   Recent Labs   Lab 03/04/24  0430   *   K 3.8   CL 99   CO2 14*   GLU 42*   BUN 98*   CREATININE 5.0*   CALCIUM 6.2*   PROT 6.4   ALBUMIN 2.6*   BILITOT 0.3   ALKPHOS 133   AST 38   ALT 49*   ANIONGAP 19*     Cardiac Markers:   Recent Labs   Lab 03/04/24  0430   *     Lactic Acid: No results for input(s): "LACTATE" in the last 48 hours.  Lipid Panel: No results for " "input(s): "CHOL", "HDL", "LDLCALC", "TRIG", "CHOLHDL" in the last 48 hours.  Magnesium:   Recent Labs   Lab 03/04/24  0550   MG 1.8     Respiratory Culture: No results for input(s): "GSRESP", "RESPIRATORYC" in the last 48 hours.  Troponin:   Recent Labs   Lab 03/04/24  0430   TROPONINI 0.034*     TSH:   Recent Labs   Lab 02/29/24  0946   TSH 0.762     Urine Culture: No results for input(s): "LABURIN" in the last 48 hours.  Urine Studies: No results for input(s): "COLORU", "APPEARANCEUA", "PHUR", "SPECGRAV", "PROTEINUA", "GLUCUA", "KETONESU", "BILIRUBINUA", "OCCULTUA", "NITRITE", "UROBILINOGEN", "LEUKOCYTESUR", "RBCUA", "WBCUA", "BACTERIA", "SQUAMEPITHEL", "HYALINECASTS" in the last 48 hours.    Invalid input(s): "WRIGHTSUR"  Recent Lab Results         03/04/24  0608   03/04/24  0550   03/04/24  0430        Albumin     2.6       ALP     133       ALT     49       Anion Gap     19       AST     38       Baso #     0.04       Basophil %     0.3       BILIRUBIN TOTAL     0.3  Comment: For infants and newborns, interpretation of results should be based  on gestational age, weight and in agreement with clinical  observations.    Premature Infant recommended reference ranges:  Up to 24 hours.............<8.0 mg/dL  Up to 48 hours............<12.0 mg/dL  3-5 days..................<15.0 mg/dL  6-29 days.................<15.0 mg/dL         BNP     821  Comment: Values of less than 100 pg/ml are consistent with non-CHF populations.       BUN     98       Calcium     6.2  Comment: GLU, CA  critical result(s) called and verbal readback obtained from   Deepthi MEDINA by LIANNA 03/04/2024 05:18         Calcium Level Ionized   0.77         Chloride     99       CO2     14       Creatinine     5.0       Differential Method     Automated       eGFR     8       Eos #     0.2       Eos %     2.0       Glucose     42  Comment: GLU, CA  critical result(s) called and verbal readback obtained from   Deepthi MEDINA by MUNIR 03/04/2024 05:18         " Gran # (ANC)     10.2       Gran %     84.8       Hematocrit     23.9       Hemoglobin     8.2       Immature Grans (Abs)     0.13  Comment: Mild elevation in immature granulocytes is non specific and   can be seen in a variety of conditions including stress response,   acute inflammation, trauma and pregnancy. Correlation with other   laboratory and clinical findings is essential.         Immature Granulocytes     1.1       Lymph #     0.8       Lymph %     6.9       Magnesium    1.8         MCH     31.4       MCHC     34.3       MCV     92       Mono #     0.6       Mono %     4.9       MPV     10.5       nRBC     0       Phosphorus Level   7.5         Platelet Count     261       POCT Glucose 139           Potassium     3.8       PROTEIN TOTAL     6.4       PTH   541.6         RBC     2.61       RDW     13.6       Sodium     132       Troponin I     0.034  Comment: The reference interval for Troponin I represents the 99th percentile   cutoff   for our facility and is consistent with 3rd generation assay   performance.         WBC     12.07               Significant Imaging: I have reviewed all pertinent imaging results/findings within the past 24 hours.  Assessment/Plan:     * HCAP (healthcare-associated pneumonia)  Recently discharged from the hospital and readmitted with abnormal CXR  Will give zosyn  F/U blood Cx  Respiratory care  Consider repeat CXR or CT chest if no improvement      ALT (SGPT) level raised  Likely hepato-renal syndrome  Monitor LFTs  With previous metabolic encephalopathy  Monitor for AMS  Consider ammonia level if worsening      Troponin level elevated  With chest pain  Mild changes on 12 lead EKG  R/O ACS  Last 2D echo 3/1/24 Left Ventricle: The left ventricle is normal in size. There is concentric remodeling. Septal motion is consistent with pacing. There is reduced systolic function. Biplane (2D) method of discs ejection fraction is 52%.   Will consult  cardiology      Hypocalcemia  Patient has hypocalcemia due to ESRD related to vitamin D disorder which is currently uncontrolled, and has been confirmed with ionized and/or corrected calcium. Will replace calcium and monitor electrolytes closely. The latest calcium labs have been reviewed and are listed below.  Recent Labs   Lab 03/04/24  0430   CALCIUM 6.2*       Recent Labs   Lab 03/04/24  0550   CAION 0.77*     Calcium supplement      ESRD on peritoneal dialysis  Resume PD  Nephrology consult  Monitor electrolytes      Essential hypertension  Chronic, controlled. Latest blood pressure and vitals reviewed-     Temp:  [97.5 °F (36.4 °C)-99.7 °F (37.6 °C)]   Pulse:  [66-70]   Resp:  [16-25]   BP: (127-169)/(58-70)   SpO2:  [93 %-97 %] .   Home meds for hypertension were reviewed and noted below.   Hypertension Medications               carvediloL (COREG) 25 MG tablet TAKE 2 TABLETS (50 MG TOTAL) BY MOUTH 2 (TWO) TIMES DAILY.    furosemide (LASIX) 40 MG tablet Take 1 tablet (40 mg total) by mouth once daily.    hydrALAZINE (APRESOLINE) 100 MG tablet Take 1 tablet (100 mg total) by mouth 2 (two) times daily.    nitroGLYCERIN (NITROSTAT) 0.4 MG SL tablet Place 0.4 mg under the tongue every 5 (five) minutes as needed for Chest pain.     valsartan (DIOVAN) 160 MG tablet Take 160 mg by mouth once daily.            While in the hospital, will manage blood pressure as follows; Continue home antihypertensive regimen    Will utilize p.r.n. blood pressure medication only if patient's blood pressure greater than 180/110 and she develops symptoms such as worsening chest pain or shortness of breath.    Hyponatremia  Patient has hyponatremia which is controlled,We will aim to correct the sodium by 4-6mEq in 24 hours. We will monitor sodium Daily. The hyponatremia is due to Dehydration/hypovolemia, Heart Failure, and renal insufficiency. We will obtain the following studies: TSH, T4. We will treat the hyponatremia with Fluid  restriction of:  1.5 liter per day. The patient's sodium results have been reviewed and are listed below.  Recent Labs   Lab 03/04/24  0430   *       Acute on chronic combined systolic and diastolic heart failure  With elevated BNP, not in acute decompensation  Will resume lasix  Daily weight  I&Os      Hyperlipidemia associated with type 2 diabetes mellitus  Cont statin      Weakness  Likely from age related debility  Will consult PT  Encourage mobilization      COPD (chronic obstructive pulmonary disease)  Patient's COPD is controlled currently.  Patient is currently off COPD Pathway. Continue scheduled inhalers Antibiotics and Supplemental oxygen and monitor respiratory status closely.     Iron deficiency anemia  Patient's anemia is currently controlled. Has not received any PRBCs to date. Etiology likely d/t chronic disease due to Chronic Kidney Disease/ESRD  Current CBC reviewed-   Lab Results   Component Value Date    HGB 8.2 (L) 03/04/2024    HCT 23.9 (L) 03/04/2024     Monitor serial CBC and transfuse if patient becomes hemodynamically unstable, symptomatic or H/H drops below 7/21.    Type 2 diabetes mellitus with stage 4 chronic kidney disease and hypertension  Creatine stable for now. BMP reviewed- noted Estimated Creatinine Clearance: 7.6 mL/min (A) (based on SCr of 5 mg/dL (H)). according to latest data. 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  Monitor UOP and serial BMP and adjust therapy as needed. Renally dose meds. Avoid nephrotoxic medications and procedures.    Coronary artery disease involving native coronary artery without angina pectoris - Non-obstructive 50% LAD  Patient with known CAD s/p  pacemaker and stent placement, which is controlled Will continue ASA and monitor for S/Sx of angina/ACS. Continue to monitor on telemetry.       VTE Risk Mitigation (From admission, onward)           Ordered     heparin (porcine) injection 5,000 Units  Every 8 hours         03/04/24 0601     IP  VTE HIGH RISK PATIENT  Once         03/04/24 0601     Place sequential compression device  Until discontinued         03/04/24 0601                       On 03/04/2024, patient should be placed in hospital observation services under my care.        Pharmacist Renal Dose Adjustment Note    Myesha Quinones is a 83 y.o. female being treated with the medication zosyn    Patient Data:    Vital Signs (Most Recent):  Temp: 97.5 °F (36.4 °C) (03/04/24 0600)  Pulse: 70 (03/04/24 0600)  Resp: (!) 25 (03/04/24 0600)  BP: (!) 169/70 (03/04/24 0600)  SpO2: 97 % (03/04/24 0600) Vital Signs (72h Range):  Temp:  [97.5 °F (36.4 °C)-99.7 °F (37.6 °C)]   Pulse:  [66-70]   Resp:  [16-25]   BP: (127-169)/(58-70)   SpO2:  [93 %-97 %]      Recent Labs   Lab 03/01/24  0606 03/02/24  0607 03/04/24  0430   CREATININE 4.7*  4.7* 4.4* 5.0*     Serum creatinine: 5 mg/dL (H) 03/04/24 0430  Estimated creatinine clearance: 7.6 mL/min (A)    Medication:zosyn dose: 3.375g frequency q8 will be changed to medication:zosyn dose:4.5g frequency:q12    Pharmacist's Name: Scott Guy  Pharmacist's Extension: [phone]    Time spent > 40 min    Sid Cárdenas MD  Department of Hospital Medicine  [place] - Emergency Dept

## 2024-03-04 NOTE — HPI
84yo female with HCAP, HLP, CAD, WILMER, COPD, DMII, CKD stage IV, acute on chronic systolic and diastolic heart failure s/p AICD, HTN, ESRD on PD, elevated ALT and hypocalcemia who presented to the ER with complaints of chest pain as well as decreased appetite, difficulty sleeping, fatigue and generalized weakness. She reports chest pain described as a left sided stabbing pain that radiated to her back. She reports the pain was worse and not improving therefore she presented to the ER. She reports the pain can be worse with deep breathing and was reproducible upon palpation on exam. She was given SL NTG and IV Morphine in the ER with no improvement. She was recently discharged following admission for pneumonia and metabolic encephalopathy. Labs CBC with H&H 8.2&23.9 and BMP with creatinine 5.0  Tropnin 0.034 EKG V paced rhythm. Admitted to [facility] and Cardiology consulted for evaluation of chest pain and elevated troponin

## 2024-03-04 NOTE — ASSESSMENT & PLAN NOTE
Patient has hyponatremia which is controlled,We will aim to correct the sodium by 4-6mEq in 24 hours. We will monitor sodium Daily. The hyponatremia is due to Dehydration/hypovolemia, Heart Failure, and renal insufficiency. We will obtain the following studies: TSH, T4. We will treat the hyponatremia with Fluid restriction of:  1.5 liter per day. The patient's sodium results have been reviewed and are listed below.  Recent Labs   Lab 03/04/24  0430   *

## 2024-03-04 NOTE — ASSESSMENT & PLAN NOTE
- initial troponin 0.034; EKG with V paced rhythm  - pain atypical on presentation- worsens with deep breathing and reproducible upon palpation   - recent echo with normal LVEF  - feel troponin elevation related to demand etiology and no concern for ACS  - continue GDMT with ASA, statin, BB and ARB

## 2024-03-04 NOTE — PLAN OF CARE
Problem: Occupational Therapy  Goal: Occupational Therapy Goal  Description: Goals to be met by: 4/4/23     Patient will increase functional independence with ADLs by performing:    LE Dressing with Modified [place].  Grooming while standing with Modified [place].  Toileting from toilet with Modified [place] for hygiene and clothing management.   Supine to sit with Modified [place].  Step transfer with Modified [place]  Toilet transfer to toilet with Modified [place].  Increased functional strength to WFL for self care skills and functional mobility.  Upper extremity exercise program x10 reps per handout, with independence.    Outcome: Ongoing, Progressing     Pt would benefit from cont OT services in order to maximize functional independence. Recommendations TBD at this time. Pt know from previous admission; pt reporting chest pain and SOB during limited evaluation at this time. Will progress pt as able.

## 2024-03-04 NOTE — SUBJECTIVE & OBJECTIVE
Past Medical History:   Diagnosis Date    Acute encephalopathy 2/29/2024    Acute hypoxemic respiratory failure 12/19/2019    Acute right-sided thoracic back pain 12/09/2019    Allergy     Asthma     Basal cell carcinoma     left forehead    Basal cell carcinoma     left nose    Basal cell carcinoma 05/20/2015    right nose    Basal cell carcinoma 12/22/2015    left lower post neck    Basal cell carcinoma 12/03/2019    left ant scalp     BCC (basal cell carcinoma of skin) 10/20/2022    Right alar groove    Bilateral pleural effusion     Bilateral renal cysts     Breast cancer     CAD (coronary artery disease)     Cardiomyopathy     Cardiomyopathy, ischemic     Cataract     CHF (congestive heart failure)     CKD (chronic kidney disease) stage 4, GFR 15-29 ml/min     Colon polyp 2011    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, with long-term current use of insulin 02/22/2018    COPD (chronic obstructive pulmonary disease)     COPD exacerbation 04/08/2018    Current mild episode of major depressive disorder without prior episode 01/25/2022    Defibrillator discharge     Dependence on renal dialysis 08/22/2023    Diabetes mellitus     Diabetes mellitus type II     Diabetes with neurologic complications     Edema     ESRD needing dialysis     Goiter     MNG    Hematuria, unspecified     HX: breast cancer     Hyperlipidemia     Hypertension     Hypocalcemia     Hypokalemia     Hyponatremia     Hyponatremia 04/02/2022    Iron deficiency anemia 05/16/2017    Iron deficiency anemia     Iron deficiency anemia     Left kidney mass     Meningioma     Microalbuminuria due to type 2 diabetes mellitus 01/26/2022    Osteoporosis, postmenopausal     Pneumonia 12/08/2019    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    Proteinuria 01/21/2019    Pseudomonas pneumonia     SCC (squamous cell carcinoma) 08/25/2022    right posterior neck    Skin cancer     s/p  excision    Sleep apnea     CPAP    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/2015    mid forehead    Unspecified vitamin D deficiency     UTI (urinary tract infection) 04/02/2022    Ventricular tachycardia     Vitamin B12 deficiency     Vitamin D deficiency disease        Past Surgical History:   Procedure Laterality Date    BASAL CELL CARCINOMA EXCISION      posterior neck and nose    BREAST BIOPSY      BREAST CYST EXCISION Left     BREAST SURGERY      CARDIAC DEFIBRILLATOR PLACEMENT      x 2    CATARACT EXTRACTION W/  INTRAOCULAR LENS IMPLANT Bilateral     CHOLECYSTECTOMY      COLONOSCOPY N/A 11/5/2019    Procedure: COLONOSCOPY;  Surgeon: Boaz Botello MD;  Location: [address]);  Service: Endoscopy;  Laterality: N/A;  AICD - [employer] -     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    FRACTURE SURGERY      left elbow and wrist as a child    HYSTERECTOMY      INSERTION, CATHETER, DIALYSIS, PERITONEAL, LAPAROSCOPIC N/A 4/25/2023    Procedure: INSERTION, CATHETER, DIALYSIS, PERITONEAL, LAPAROSCOPIC;  Surgeon: Marcelo Isaac MD;  Location: [facility];  Service: General;  Laterality: N/A;    LAPAROSCOPIC LYSIS OF ADHESIONS N/A 4/25/2023    Procedure: LYSIS, ADHESIONS, LAPAROSCOPIC;  Surgeon: Marcelo Isaac MD;  Location: [facility] OR;  Service: General;  Laterality: N/A;    MASTECTOMY Right     OMENTOPEXY, LAPAROSCOPIC N/A 4/25/2023    Procedure: OMENTOPEXY, LAPAROSCOPIC;  Surgeon: Marcelo Isaac MD;  Location: [facility];  Service: General;  Laterality: N/A;    REPLACEMENT OF IMPLANTABLE CARDIOVERTER-DEFIBRILLATOR (ICD) GENERATOR N/A 12/17/2018    Procedure: REPLACEMENT, ICD GENERATOR;  Surgeon: Jan Mckeon MD;  Location: [facility] LAB;  Service: Cardiology;  Laterality: N/A;  DONNA, CRT-D gen change, MDT, MAC, SK, 3 Prep    REVISION OF SKIN POCKET FOR CARDIOVERTER-DEFIBRILLATOR  12/17/2018    Procedure: Revision, Skin Pocket, For Cardioverter-Defibrillator;  Surgeon: Jan Mckeon MD;  Location: [facility] LAB;   Service: Cardiology;;    SQUAMOUS CELL CARCINOMA EXCISION      remved from forehead    TONSILLECTOMY         Review of patient's allergies indicates:   Allergen Reactions    Iodine and iodide containing products Hives and Other (See Comments)     Not specified     Nifedipine      weakness       No current facility-administered medications on file prior to encounter.     Current Outpatient Medications on File Prior to Encounter   Medication Sig    acetaminophen (TYLENOL) 500 MG tablet Take 500 mg by mouth as needed.    albuterol (PROVENTIL/VENTOLIN HFA) 90 mcg/actuation inhaler Inhale 2 puffs into the lungs every 6 (six) hours as needed for Wheezing. Rescue    albuterol-ipratropium (DUO-NEB) 2.5 mg-0.5 mg/3 mL nebulizer solution Take 3 mLs by nebulization every 4 (four) hours as needed for Wheezing.    amoxicillin-clavulanate 250-125mg (AUGMENTIN) 250-125 mg Tab Take 1 tablet by mouth every 12 (twelve) hours. for 2 days    aspirin 81 MG Chew Take 81 mg by mouth once daily.    blood sugar diagnostic (ACCU-CHEK HECTOR) Strp Uses Accu-Check Hector meter to test BG 4x/day    calcitRIOL (ROCALTROL) 0.5 MCG Cap Take 1 capsule (0.5 mcg total) by mouth once daily.    carvediloL (COREG) 25 MG tablet TAKE 2 TABLETS (50 MG TOTAL) BY MOUTH 2 (TWO) TIMES DAILY.    cholecalciferol, vitamin D3, (VITAMIN D3) 50 mcg (2,000 unit) Tab Take 1 tablet by mouth once daily.     cyanocobalamin (VITAMIN B-12) 100 MCG tablet Take 100 mcg by mouth once daily.    dulaglutide (TRULICITY) 1.5 mg/0.5 mL pen injector Inject 1.5 mg into the skin every 7 days.    epoetin [place]-epbx (RETACRIT) 10,000 unit/mL imjection Inject 1 mL (10,000 Units total) into the skin every 30 days.    ergocalciferol (ERGOCALCIFEROL) 50,000 unit Cap Take 50,000 Units by mouth every 7 days.    estradioL (ESTRACE) 0.01 % (0.1 mg/gram) vaginal cream PLACE 1 GRAM VAGINALLY EVERY OTHER DAY    estradioL (ESTRING) 2 mg (7.5 mcg /24 hour) vaginal ring Place 2 mg vaginally every 3  "(three) months.    fluticasone propionate (FLONASE) 50 mcg/actuation nasal spray 1 spray by Each Nostril route daily as needed.    furosemide (LASIX) 40 MG tablet Take 1 tablet (40 mg total) by mouth once daily.    hydrALAZINE (APRESOLINE) 100 MG tablet Take 1 tablet (100 mg total) by mouth 2 (two) times daily.    insulin (LANTUS SOLOSTAR U-100 INSULIN) glargine 100 units/mL SubQ pen Inject 12 Units into the skin every evening.    lancets (ACCU-CHEK SOFTCLIX LANCETS) Misc Uses Accu-Chek Angelica meter to test BG 2x/day    montelukast (SINGULAIR) 10 mg tablet Take 1 tablet (10 mg total) by mouth once daily.    nitroGLYCERIN (NITROSTAT) 0.4 MG SL tablet Place 0.4 mg under the tongue every 5 (five) minutes as needed for Chest pain.     omega-3 fatty acids 500 mg Cap Take 1 capsule by mouth Daily.    OZEMPIC 0.25 mg or 0.5 mg (2 mg/3 mL) pen injector Inject 0.25 mg into the skin every Wednesday.    pen needle, diabetic (BD ULTRA-FINE MINI PEN NEEDLE) 31 gauge x 3/16" Ndle Use with insulin twice a day.    pravastatin (PRAVACHOL) 40 MG tablet Take 1 tablet (40 mg total) by mouth once daily.    sevelamer carbonate (RENVELA) 800 mg Tab Take 1 tablet (800 mg total) by mouth 3 (three) times daily.    sodium bicarbonate 650 MG tablet Take 1 tablet (650 mg total) by mouth 3 (three) times daily.    valsartan (DIOVAN) 160 MG tablet Take 160 mg by mouth once daily.     Family History       Problem Relation (Age of Onset)    Asthma Mother    Cancer Brother, Daughter, Son    Cardiomyopathy Father    Cerebral aneurysm Brother    Diabetes Father, Sister, Brother, Brother, Brother, Brother, Daughter, Son, Son    Heart attack Sister, Brother    Heart disease Sister, Brother, Brother, Brother    Hypertension Mother, Brother, Brother    Melanoma Daughter    No Known Problems Maternal Grandmother, Maternal Grandfather, Paternal Grandmother, Paternal Grandfather    Obesity Daughter    Stroke Mother          Tobacco Use    Smoking status: Never "     Passive exposure: Never    Smokeless tobacco: Never   Substance and Sexual Activity    Alcohol use: No     Alcohol/week: 0.0 standard drinks of alcohol    Drug use: No    Sexual activity: Yes     Partners: Male     Review of Systems   Constitutional:  Positive for activity change, appetite change and fatigue. Negative for diaphoresis and fever.   HENT:  Positive for hearing loss.    Respiratory:  Positive for chest tightness and shortness of breath. Negative for cough and wheezing.    Cardiovascular:  Positive for chest pain. Negative for palpitations and leg swelling.   Gastrointestinal:  Negative for abdominal pain, constipation, diarrhea, nausea and vomiting.   Musculoskeletal:  Positive for gait problem.   Skin:  Negative for rash and wound.   Neurological:  Positive for weakness. Negative for dizziness, speech difficulty and headaches.   Psychiatric/Behavioral:  Negative for agitation, confusion and hallucinations.    All other systems reviewed and are negative.    Objective:     Vital Signs (Most Recent):  Temp: 97.5 °F (36.4 °C) (03/04/24 0600)  Pulse: 70 (03/04/24 0600)  Resp: (!) 25 (03/04/24 0600)  BP: (!) 169/70 (03/04/24 0600)  SpO2: 97 % (03/04/24 0600) Vital Signs (24h Range):  Temp:  [97.5 °F (36.4 °C)-99.7 °F (37.6 °C)] 97.5 °F (36.4 °C)  Pulse:  [66-70] 70  Resp:  [16-25] 25  SpO2:  [93 %-97 %] 97 %  BP: (127-169)/(58-70) 169/70     Weight: 65.8 kg (145 lb)  Body mass index is 25.69 kg/m².     Physical Exam  Vitals and nursing note reviewed.   Constitutional:       General: She is in acute distress.      Appearance: She is ill-appearing. She is not toxic-appearing.   HENT:      Head: Normocephalic and atraumatic.      Nose: Congestion present.      Mouth/Throat:      Mouth: Mucous membranes are moist.      Pharynx: No oropharyngeal exudate.   Eyes:      Extraocular Movements: Extraocular movements intact.      Pupils: Pupils are equal, round, and reactive to light.   Cardiovascular:      Rate  "and Rhythm: Normal rate.      Heart sounds: No murmur heard.     No friction rub. No gallop.   Pulmonary:      Effort: Pulmonary effort is normal. No respiratory distress.      Breath sounds: No wheezing or rales.   Chest:      Chest wall: Tenderness present.   Abdominal:      General: Bowel sounds are normal. There is no distension.      Palpations: Abdomen is soft.      Tenderness: There is no abdominal tenderness. There is no guarding or rebound.   Musculoskeletal:         General: No swelling or tenderness.      Cervical back: Neck supple. No rigidity or tenderness.      Right lower leg: No edema.      Left lower leg: No edema.   Skin:     Findings: No erythema or rash.   Neurological:      General: No focal deficit present.      Mental Status: She is alert and oriented to person, place, and time. Mental status is at baseline.      Motor: Weakness present.      Coordination: Coordination normal.      Gait: Gait abnormal.   Psychiatric:         Thought Content: Thought content normal.              CRANIAL NERVES     CN III, IV, VI   Pupils are equal, round, and reactive to light.       Significant Labs: All pertinent labs within the past 24 hours have been reviewed.  A1C:   Recent Labs   Lab 10/24/23  1130 02/29/24  1159   HGBA1C 4.9 6.5*     Blood Culture: No results for input(s): "LABBLOO" in the last 48 hours.  BMP:   Recent Labs   Lab 03/04/24  0430 03/04/24  0550   GLU 42*  --    *  --    K 3.8  --    CL 99  --    CO2 14*  --    BUN 98*  --    CREATININE 5.0*  --    CALCIUM 6.2*  --    MG  --  1.8     CBC:   Recent Labs   Lab 03/04/24  0430   WBC 12.07   HGB 8.2*   HCT 23.9*        CMP:   Recent Labs   Lab 03/04/24  0430   *   K 3.8   CL 99   CO2 14*   GLU 42*   BUN 98*   CREATININE 5.0*   CALCIUM 6.2*   PROT 6.4   ALBUMIN 2.6*   BILITOT 0.3   ALKPHOS 133   AST 38   ALT 49*   ANIONGAP 19*     Cardiac Markers:   Recent Labs   Lab 03/04/24  0430   *     Lactic Acid: No results for " "input(s): "LACTATE" in the last 48 hours.  Lipid Panel: No results for input(s): "CHOL", "HDL", "LDLCALC", "TRIG", "CHOLHDL" in the last 48 hours.  Magnesium:   Recent Labs   Lab 03/04/24  0550   MG 1.8     Respiratory Culture: No results for input(s): "GSRESP", "RESPIRATORYC" in the last 48 hours.  Troponin:   Recent Labs   Lab 03/04/24  0430   TROPONINI 0.034*     TSH:   Recent Labs   Lab 02/29/24  0946   TSH 0.762     Urine Culture: No results for input(s): "LABURIN" in the last 48 hours.  Urine Studies: No results for input(s): "COLORU", "APPEARANCEUA", "PHUR", "SPECGRAV", "PROTEINUA", "GLUCUA", "KETONESU", "BILIRUBINUA", "OCCULTUA", "NITRITE", "UROBILINOGEN", "LEUKOCYTESUR", "RBCUA", "WBCUA", "BACTERIA", "SQUAMEPITHEL", "HYALINECASTS" in the last 48 hours.    Invalid input(s): "WRIGHTSUR"  Recent Lab Results         03/04/24  0608   03/04/24  0550   03/04/24  0430        Albumin     2.6       ALP     133       ALT     49       Anion Gap     19       AST     38       Baso #     0.04       Basophil %     0.3       BILIRUBIN TOTAL     0.3  Comment: For infants and newborns, interpretation of results should be based  on gestational age, weight and in agreement with clinical  observations.    Premature Infant recommended reference ranges:  Up to 24 hours.............<8.0 mg/dL  Up to 48 hours............<12.0 mg/dL  3-5 days..................<15.0 mg/dL  6-29 days.................<15.0 mg/dL         BNP     821  Comment: Values of less than 100 pg/ml are consistent with non-CHF populations.       BUN     98       Calcium     6.2  Comment: GLU, CA  critical result(s) called and verbal readback obtained from   Deepthi MEDINA by [facility] 03/04/2024 05:18         Calcium Level Ionized   0.77         Chloride     99       CO2     14       Creatinine     5.0       Differential Method     Automated       eGFR     8       Eos #     0.2       Eos %     2.0       Glucose     42  Comment: GLU, CA  critical result(s) called and verbal " readback obtained from   Deepthi MEDINA by [facility] 03/04/2024 05:18         Gran # (ANC)     10.2       Gran %     84.8       Hematocrit     23.9       Hemoglobin     8.2       Immature Grans (Abs)     0.13  Comment: Mild elevation in immature granulocytes is non specific and   can be seen in a variety of conditions including stress response,   acute inflammation, trauma and pregnancy. Correlation with other   laboratory and clinical findings is essential.         Immature Granulocytes     1.1       Lymph #     0.8       Lymph %     6.9       Magnesium    1.8         MCH     31.4       MCHC     34.3       MCV     92       Mono #     0.6       Mono %     4.9       MPV     10.5       nRBC     0       Phosphorus Level   7.5         Platelet Count     261       POCT Glucose 139           Potassium     3.8       PROTEIN TOTAL     6.4       PTH   541.6         RBC     2.61       RDW     13.6       Sodium     132       Troponin I     0.034  Comment: The reference interval for Troponin I represents the 99th percentile   cutoff   for our facility and is consistent with 3rd generation assay   performance.         WBC     12.07               Significant Imaging: I have reviewed all pertinent imaging results/findings within the past 24 hours.

## 2024-03-04 NOTE — PLAN OF CARE
Patient seen for physical therapy evaluation on this date, co-evaluation with occupational therapy.  Patient with recent admission last week and known to therapy services.  Patient will benefit from inpatient physical therapy to address limitations.  Recommendations are TBD, pending progress.  Evaluation limited due to patient with 10/10 pain in chest area, nursing aware.  Patient with mild SOB after stand step transfer from stretcher to bed.  Physical therapy will continue to follow.      Problem: Physical Therapy  Goal: Physical Therapy Goal  Description: Goals to be met by: 4/3/2024     Patient will increase functional independence with mobility by performin. Supine to sit with Modified [place]  2. Sit to supine with Modified [place]  3. Sit to stand transfer with Stand-by Assistance  4. Bed to chair transfer with Stand-by Assistance using Rolling Walker  5. Gait  x 150 feet with Stand-by Assistance using Rolling Walker.     Outcome: Ongoing, Progressing

## 2024-03-04 NOTE — ED PROVIDER NOTES
Encounter Date: 3/4/2024       History     Chief Complaint   Patient presents with    Chest Pain     Pt arrives from home c/o left side substernal chest pain radiating to the back starting several hours ago. Pt recently d/c'd for pneumonia.      Patient is an 83-year-old female with a history ofESRD on PD, type 2 diabetes, and hypertension, CAD who presents to the ED with the complaint of chest pain.  Patient reports left-sided chest pain described as pressure and intermittent that started approximately 3-4 hours prior to arrival.  She states the pain radiates to her back but does not radiate elsewhere.  She states it has not exacerbated or alleviated with change in body position.  She denies any associated numbness, tingling diaphoresis nausea or vomiting.  Patient was recently discharge from this hospital for treatment of pneumonia causing her to have acute encephalopathy.  She denies any cough, fever, chills.       Review of patient's allergies indicates:   Allergen Reactions    Iodine and iodide containing products Hives and Other (See Comments)     Not specified     Nifedipine      weakness     Past Medical History:   Diagnosis Date    Acute encephalopathy 2/29/2024    Acute hypoxemic respiratory failure 12/19/2019    Acute right-sided thoracic back pain 12/09/2019    Allergy     Asthma     Basal cell carcinoma     left forehead    Basal cell carcinoma     left nose    Basal cell carcinoma 05/20/2015    right nose    Basal cell carcinoma 12/22/2015    left lower post neck    Basal cell carcinoma 12/03/2019    left ant scalp     BCC (basal cell carcinoma of skin) 10/20/2022    Right alar groove    Bilateral pleural effusion     Bilateral renal cysts     Breast cancer     CAD (coronary artery disease)     Cardiomyopathy     Cardiomyopathy, ischemic     Cataract     Chest pain 3/4/2024    CHF (congestive heart failure)     CKD (chronic kidney disease) stage 4, GFR 15-29 ml/min     Colon polyp 2011    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, with long-term current use of insulin 02/22/2018    COPD (chronic obstructive pulmonary disease)     COPD exacerbation 04/08/2018    Current mild episode of major depressive disorder without prior episode 01/25/2022    Defibrillator discharge     Dependence on renal dialysis 08/22/2023    Diabetes mellitus     Diabetes mellitus type II     Diabetes with neurologic complications     Edema     ESRD needing dialysis     Goiter     MNG    Hematuria, unspecified     HX: breast cancer     Hyperlipidemia     Hypertension     Hypocalcemia     Hypokalemia     Hyponatremia     Hyponatremia 04/02/2022    Iron deficiency anemia 05/16/2017    Iron deficiency anemia     Iron deficiency anemia     Left kidney mass     Meningioma     Microalbuminuria due to type 2 diabetes mellitus 01/26/2022    Osteoporosis, postmenopausal     Pneumonia 12/08/2019    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    Proteinuria 01/21/2019    Pseudomonas pneumonia     SCC (squamous cell carcinoma) 08/25/2022    right posterior neck    Skin cancer     s/p excision    Sleep apnea     CPAP    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/2015    mid forehead    Unspecified vitamin D deficiency     UTI (urinary tract infection) 04/02/2022    Ventricular tachycardia     Vitamin B12 deficiency     Vitamin D deficiency disease      Past Surgical History:   Procedure Laterality Date    BASAL CELL CARCINOMA EXCISION      posterior neck and nose    BREAST BIOPSY      BREAST CYST EXCISION Left     BREAST SURGERY      CARDIAC DEFIBRILLATOR PLACEMENT      x 2    CATARACT EXTRACTION W/  INTRAOCULAR LENS IMPLANT Bilateral     CHOLECYSTECTOMY      COLONOSCOPY N/A 11/5/2019    Procedure: COLONOSCOPY;  Surgeon: Boaz Botello MD;  Location: [facility] ([address] [place] [address]);  Service: Endoscopy;  Laterality: N/A;  [facility] [employer] -     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     FRACTURE SURGERY      left elbow and wrist as a child    HYSTERECTOMY      INSERTION, CATHETER, DIALYSIS, PERITONEAL, LAPAROSCOPIC N/A 4/25/2023    Procedure: INSERTION, CATHETER, DIALYSIS, PERITONEAL, LAPAROSCOPIC;  Surgeon: Marcelo Isaac MD;  Location: [facility] OR;  Service: General;  Laterality: N/A;    LAPAROSCOPIC LYSIS OF ADHESIONS N/A 4/25/2023    Procedure: LYSIS, ADHESIONS, LAPAROSCOPIC;  Surgeon: Marcelo Isaac MD;  Location: [facility] OR;  Service: General;  Laterality: N/A;    MASTECTOMY Right     OMENTOPEXY, LAPAROSCOPIC N/A 4/25/2023    Procedure: OMENTOPEXY, LAPAROSCOPIC;  Surgeon: Marcelo Isaac MD;  Location: [facility] OR;  Service: General;  Laterality: N/A;    REPLACEMENT OF IMPLANTABLE CARDIOVERTER-DEFIBRILLATOR (ICD) GENERATOR N/A 12/17/2018    Procedure: REPLACEMENT, ICD GENERATOR;  Surgeon: Jan Mckeon MD;  Location: [facility] EP LAB;  Service: Cardiology;  Laterality: N/A;  DONNA, CRT-D gen change, MDT, MAC, SK, 3 Prep    REVISION OF SKIN POCKET FOR CARDIOVERTER-DEFIBRILLATOR  12/17/2018    Procedure: Revision, Skin Pocket, For Cardioverter-Defibrillator;  Surgeon: Jan Mckeon MD;  Location: [facility] EP LAB;  Service: Cardiology;;    SQUAMOUS CELL CARCINOMA EXCISION      remved from forehead    TONSILLECTOMY       Family History   Problem Relation Age of Onset    Asthma Mother     Hypertension Mother     Stroke Mother     Diabetes Father     Cardiomyopathy Father     Diabetes Sister     Heart disease Sister     Heart attack Sister     Heart attack Brother     Diabetes Brother     Heart disease Brother     Hypertension Brother     Diabetes Brother     Heart disease Brother     Hypertension Brother     Cerebral aneurysm Brother     Diabetes Brother     Heart disease Brother     Cancer Brother         colon    Diabetes Brother     Diabetes Daughter         prediabetes    Cancer Daughter         melanoma    Obesity Daughter     Melanoma Daughter     Cancer Son         skin    Diabetes Son          prediabetes    Diabetes Son     No Known Problems Maternal Grandmother     No Known Problems Maternal Grandfather     No Known Problems Paternal Grandmother     No Known Problems Paternal Grandfather     Amblyopia Neg Hx     Blindness Neg Hx     Cataracts Neg Hx     Glaucoma Neg Hx     Macular degeneration Neg Hx     Retinal detachment Neg Hx     Strabismus Neg Hx     Thyroid disease Neg Hx      Social History     Tobacco Use    Smoking status: Never     Passive exposure: Never    Smokeless tobacco: Never   Substance Use Topics    Alcohol use: No     Alcohol/week: 0.0 standard drinks of alcohol    Drug use: No     Review of Systems    Physical Exam     Initial Vitals [03/04/24 0407]   BP Pulse Resp Temp SpO2   (!) 127/58 69 16 99.7 °F (37.6 °C) (!) 94 %      MAP       --         Physical Exam    ED Course   Critical Care    Date/Time: 3/4/2024 9:16 PM    Performed by: Ishaan Flynn MD  Authorized by: Ishaan Flynn MD  Direct patient critical care time: 15 minutes  Additional history critical care time: 5 minutes  Ordering / reviewing critical care time: 5 minutes  Documentation critical care time: 5 minutes  Other critical care time: 5 minutes  Total critical care time (exclusive of procedural time) : 35 minutes  Critical care was necessary to treat or prevent imminent or life-threatening deterioration of the following conditions: metabolic crisis, endocrine crisis and cardiac failure.        Labs Reviewed   CBC W/ AUTO DIFFERENTIAL - Abnormal; Notable for the following components:       Result Value    RBC 2.61 (*)     Hemoglobin 8.2 (*)     Hematocrit 23.9 (*)     MCH 31.4 (*)     Immature Granulocytes 1.1 (*)     Gran # (ANC) 10.2 (*)     Immature Grans (Abs) 0.13 (*)     Lymph # 0.8 (*)     Gran % 84.8 (*)     Lymph % 6.9 (*)     All other components within normal limits   COMPREHENSIVE METABOLIC PANEL - Abnormal; Notable for the following components:    Sodium 132 (*)     CO2 14 (*)     Glucose 42  (*)     BUN 98 (*)     Creatinine 5.0 (*)     Calcium 6.2 (*)     Albumin 2.6 (*)     ALT 49 (*)     eGFR 8 (*)     Anion Gap 19 (*)     All other components within normal limits    Narrative:     GLU, CA  critical result(s) called and verbal readback obtained from   Deepthi MEDINA by MUNIR 03/04/2024 05:18   TROPONIN I - Abnormal; Notable for the following components:    Troponin I 0.034 (*)     All other components within normal limits   B-TYPE NATRIURETIC PEPTIDE - Abnormal; Notable for the following components:     (*)     All other components within normal limits   PHOSPHORUS - Abnormal; Notable for the following components:    Phosphorus 7.5 (*)     All other components within normal limits   PTH, INTACT - Abnormal; Notable for the following components:    PTH, Intact 541.6 (*)     All other components within normal limits    Narrative:     Collection Site:->Peripheral Vein   CALCIUM, IONIZED - Abnormal; Notable for the following components:    Ionized Calcium 0.77 (*)     All other components within normal limits    Narrative:     Collection Site:->Peripheral Vein   PROCALCITONIN - Abnormal; Notable for the following components:    Procalcitonin 0.27 (*)     All other components within normal limits   LACTIC ACID, PLASMA - Abnormal; Notable for the following components:    Lactate (Lactic Acid) 0.4 (*)     All other components within normal limits   POCT GLUCOSE - Abnormal; Notable for the following components:    POCT Glucose 139 (*)     All other components within normal limits   POCT GLUCOSE - Abnormal; Notable for the following components:    POCT Glucose 140 (*)     All other components within normal limits   CULTURE, RESPIRATORY   MAGNESIUM     EKG Readings: (Independently Interpreted)   Initial Reading: No STEMI. Rhythm: Paced Rhythm. Heart Rate: 68. ST Segments: Normal ST Segments.   Ventricular paced rhythm; no ischemic change; no STEMI      ECG Results              EKG 12-lead (In process)         Collection Time Result Time QRS Duration OHS QTC Calculation    03/04/24 04:20:57 03/04/24 09:05:06 130 542                     In process by Interface, Lab In [facility] (03/04/24 09:05:15)                   Narrative:    Test Reason : R07.9,    Vent. Rate : 068 BPM     Atrial Rate : 068 BPM     P-R Int : 142 ms          QRS Dur : 130 ms      QT Int : 510 ms       P-R-T Axes : 068 -85 103 degrees     QTc Int : 542 ms    Sinus rhythm with Premature supraventricular complexes  Left axis deviation  Right bundle branch block  Cannot rule out Anterior infarct ,age undetermined  Abnormal ECG  When compared with ECG of 29-FEB-2024 10:08,  Sinus rhythm has replaced Electronic ventricular pacemaker    Referred By: [email or web address]   SELF           Confirmed By:                                   Imaging Results              X-Ray Chest AP Portable (Final result)  Result time 03/04/24 05:37:07      Final result by Kam Krishnamurthy MD (03/04/24 05:37:07)                   Impression:      Progression of radiographic findings, as discussed above.      Electronically signed by: Kam Krishnamurthy  Date:    03/04/2024  Time:    05:37               Narrative:    EXAMINATION:  XR CHEST AP PORTABLE    CLINICAL HISTORY:  Chest Pain;    TECHNIQUE:  Single frontal view of the chest was performed.    COMPARISON:  Chest radiograph February 29, 2024    FINDINGS:  Single portable chest view is submitted.  Cardiac pacemaker is noted.  When accounting for position and technique and depth of inspiration the cardiomediastinal silhouette is thought stable.  Aortic atherosclerotic change noted.    There is prominence of the pulmonary vascular.  There is bilateral pattern of interstitial and patchy alveolar infiltrates.  There is progression of the aforementioned when compared to the prior study.    There is opacity at the left lung base, this likely relates to pleural effusion, greater opacity peripherally on the left may relate to increasing pleural fluid  and or peripheral pulmonary infiltrate/airspace disease.    There is no significant pleural effusion on the right and there is no pneumothorax.    The osseous structures demonstrate chronic change.                                       Medications   albuterol inhaler 2 puff (has no administration in time range)   albuterol-ipratropium 2.5 mg-0.5 mg/3 mL nebulizer solution 3 mL (3 mLs Nebulization Not Given 3/4/24 0822)   aspirin chewable tablet 81 mg (81 mg Oral Given 3/4/24 0858)   carvediloL tablet 25 mg (25 mg Oral Given 3/4/24 2056)   hydrALAZINE tablet 100 mg (100 mg Oral Given 3/4/24 2056)   fluticasone propionate 50 mcg/actuation nasal spray 50 mcg (has no administration in time range)   pravastatin tablet 40 mg (40 mg Oral Given 3/4/24 0900)   valsartan tablet 160 mg (160 mg Oral Given 3/4/24 1153)   vitamin D 1000 units tablet 2,000 Units (2,000 Units Oral Given 3/4/24 0859)   cyanocobalamin tablet 100 mcg (100 mcg Oral Given 3/4/24 1203)   sevelamer carbonate tablet 800 mg (800 mg Oral Not Given 3/4/24 2100)   sodium chloride 0.9% flush 10 mL (has no administration in time range)   naloxone 0.4 mg/mL injection 0.02 mg (has no administration in time range)   glucose chewable tablet 16 g (has no administration in time range)   glucose chewable tablet 24 g (has no administration in time range)   glucagon (human recombinant) injection 1 mg (has no administration in time range)   heparin (porcine) injection 5,000 Units (5,000 Units Subcutaneous Given 3/4/24 2107)   acetaminophen tablet 650 mg (650 mg Oral Not Given 3/4/24 1400)   dextrose 10% bolus 125 mL 125 mL (has no administration in time range)   dextrose 10% bolus 250 mL 250 mL (0 mLs Intravenous Stopped 3/4/24 0535)   nitroGLYCERIN SL tablet 0.4 mg (0.4 mg Sublingual Given 3/4/24 0647)   piperacillin-tazobactam (ZOSYN) 4.5 g in dextrose 5 % in water (D5W) 100 mL IVPB (MB+) (4.5 g Intravenous New Bag 3/4/24 2103)   acetaminophen tablet 650 mg (650 mg Oral  Given 3/4/24 2055)   HYDROcodone-acetaminophen 5-325 mg per tablet 1 tablet (1 tablet Oral Given 3/4/24 1837)   aluminum-magnesium hydroxide-simethicone 200-200-20 mg/5 mL suspension 30 mL (30 mLs Oral Not Given 3/4/24 1030)     And   LIDOcaine viscous HCl 2% oral solution 15 mL (15 mLs Oral Not Given 3/4/24 1030)   sodium bicarbonate tablet 650 mg (650 mg Oral Given 3/4/24 2056)   furosemide injection 60 mg (60 mg Intravenous Given 3/4/24 2055)   azithromycin tablet 500 mg (has no administration in time range)   glucose chewable tablet 16 g (has no administration in time range)   glucose chewable tablet 24 g (has no administration in time range)   glucagon (human recombinant) injection 1 mg (has no administration in time range)   insulin aspart U-100 pen 0-5 Units (has no administration in time range)   dextrose 10% bolus 125 mL 125 mL (has no administration in time range)   dextrose 10% bolus 250 mL 250 mL (has no administration in time range)   morphine injection 2 mg (2 mg Intravenous Given 3/4/24 0446)   calcium gluconate 1 g in NS IVPB (premixed) (0 g Intravenous Stopped 3/4/24 0704)   azithromycin (ZITHROMAX) 500 mg in dextrose 5 % (D5W) 250 mL IVPB (Vial-Mate) (0 mg Intravenous Stopped 3/4/24 0705)     Medical Decision Making  Amount and/or Complexity of Data Reviewed  Labs: ordered. Decision-making details documented in ED Course.  Radiology: ordered. Decision-making details documented in ED Course.    Risk  Prescription drug management.               ED Course as of 03/04/24 2117   Mon Mar 04, 2024   0438 Hemoglobin(!): 8.2 [LC]   0438 Hematocrit(!): 23.9 [LC]   0438 WBC: 12.07 [LC]   0455 Pt placed on 2L of O2 with improvement of O2 sat from 93 to 99%.  [LC]   0507 Troponin I(!): 0.034 [LC]   0515 BNP(!): 821 [LC]   0515 Troponin I(!): 0.034 [LC]   0548 X-Ray Chest AP Portable  There is opacity at the left lung base, this likely relates to pleural effusion, greater opacity peripherally on the left may  relate to increasing pleural fluid and or peripheral pulmonary infiltrate/airspace disease.     There is no significant pleural effusion on the right and there is no pneumothorax.   [LC]   0603 Case discussed with the [facility] hospitalist who will admit the patient for chest pain, elevated troponin, hypo calcemia;  [LC]   0603 Patient and daughter at bedside updated regarding plan of care and need for admission at this time.  They are both comfortable with plan for admission. [LC]      ED Course User Index  [LC] Ishaan Flynn MD                             Clinical Impression:  Final diagnoses:  [R07.9] Chest pain  [J18.9] CAP (community acquired pneumonia)  [E83.51] Hypocalcemia  [R79.89] Elevated troponin  [E16.2] Hypoglycemia  [R25.2] Muscle cramping          ED Disposition Condition    Observation Stable                Ishaan Flynn MD  03/04/24 7106

## 2024-03-04 NOTE — PT/OT/SLP EVAL
"Physical Therapy Evaluation    Patient Name:  Myesha Quinones   MRN:  [id number]    Recommendations:     Discharge Recommendations:  (TBD)   Discharge Equipment Recommendations: none   Barriers to discharge: Decreased caregiver support and chest pain, and requires assist with mobility    Assessment:     Myesha Quinones is a 83 y.o. female admitted with a medical diagnosis of HCAP (healthcare-associated pneumonia).  She presents with the following impairments/functional limitations: weakness, gait instability, impaired cardiopulmonary response to activity, impaired endurance, impaired balance, decreased lower extremity function, impaired self care skills, pain, impaired functional mobility     Patient seen for physical therapy evaluation on this date, co-evaluation with occupational therapy.  Patient with recent admission last week and known to therapy services.  Patient will benefit from inpatient physical therapy to address limitations.  Recommendations are TBD, pending progress.  Evaluation limited due to patient with 10/10 pain in chest area, nursing aware.  Patient with mild SOB after stand step transfer from stretcher to bed.  Physical therapy will continue to follow.  .    Rehab Prognosis: Good; patient would benefit from acute skilled PT services to address these deficits and reach maximum level of function.    Recent Surgery: * No surgery found *      Plan:     During this hospitalization, patient to be seen 5 x/week to address the identified rehab impairments via gait training, therapeutic activities, therapeutic exercises, neuromuscular re-education and progress toward the following goals:    Plan of Care Expires:  04/03/24    Subjective     Chief Complaint: "My chest still hurts"  (Daughter states she has had no relief despite being given morphine and nitro)  Patient/Family Comments/goals: to return to PLOF  Pain/Comfort:  Pain Rating 1: 10/10  Location - Orientation 1: generalized  Location 1: " chest  Pain Addressed 1: Reposition, Distraction, Cessation of Activity, Nurse notified  Pain Rating Post-Intervention 1: 10/10    Patients cultural, spiritual, Baptism conflicts given the current situation: no    Living Environment:  Patient lives alone in [facility], no TAMEKA, WIS with shower chair in bathroom.    Prior to admission, patients level of function was Modified Independent with ADLs, ambulates with a rollator at home, + Driving, Patient uses SPC in community for ambulation.  Equipment used at home: wheelchair, walker, rolling, rollator, shower chair, bedside commode, cane, straight.  DME owned (not currently used): wheelchair.  Upon discharge, patient will have assistance from family.    Objective:     Communicated with nurse Umaña prior to session.  Patient found  on stretcher, just transported to room from ER  with peripheral IV, oxygen  upon PT entry to room.    General Precautions: Standard, fall  Orthopedic Precautions:N/A   Braces: N/A  Respiratory Status: Nasal cannula, flow 2 L/min    Exams:  Cognitive Exam:  Patient is oriented to Person, Place, Time, Situation, and follows simple commands  Gross Motor Coordination:  WFL  Postural Exam:  Patient presented with the following abnormalities:    -       Rounded shoulders  -       Forward head  -       Kyphosis  Sensation:    -       Intact  light/touch B Les grossly  Skin Integrity/Edema:      -       Skin integrity: Visible skin intact  -       Edema: Mild B feet  RLE ROM: WFL  RLE Strength: 3+ to 4/5 grossly  LLE ROM: WFL  LLE Strength: 3+ to 4/5 grossly    Functional Mobility:  Bed Mobility:     Sit to Supine: moderate assistance  Transfers:     Sit to Stand:  minimum assistance with no AD  Gait: Patient takes a few steps from stretcher to bed with min assist for turning, short shuffling steps, slow pace..  After short transfer, patient with mild SOB and fatigue.    Balance: Seated EOB:  SBA   Standing:  Poor, unsteady, requires min assist for  steadying assist      AM-PAC 6 CLICK MOBILITY  Total Score:16       Treatment & Education:  Patient educated on role of physical therapy and goals.  Patient complains of chest pain, 10/10.  Therapists assist patient from stretcher to bed with min assist, gait is unsteady, requires steadying assist.  Patient with mild SOB after short gait trial, continues with complaints of  chest pain.  Patient eager to lay supine.  Patient assisted to supine with mod assist.      Patient left supine with all lines intact, call button in reach, bed alarm on, and nurse present.    GOALS:   Multidisciplinary Problems       Physical Therapy Goals          Problem: Physical Therapy    Goal Priority Disciplines Outcome Goal Variances Interventions   Physical Therapy Goal     PT, PT/OT Ongoing, Progressing     Description: Goals to be met by: 4/3/2024     Patient will increase functional independence with mobility by performin. Supine to sit with Modified [place]  2. Sit to supine with Modified [place]  3. Sit to stand transfer with Stand-by Assistance  4. Bed to chair transfer with Stand-by Assistance using Rolling Walker  5. Gait  x 150 feet with Stand-by Assistance using Rolling Walker.                          History:     Past Medical History:   Diagnosis Date    Acute encephalopathy 2024    Acute hypoxemic respiratory failure 2019    Acute right-sided thoracic back pain 2019    Allergy     Asthma     Basal cell carcinoma     left forehead    Basal cell carcinoma     left nose    Basal cell carcinoma 2015    right nose    Basal cell carcinoma 2015    left lower post neck    Basal cell carcinoma 2019    left ant scalp     BCC (basal cell carcinoma of skin) 10/20/2022    Right alar groove    Bilateral pleural effusion     Bilateral renal cysts     Breast cancer     CAD (coronary artery disease)     Cardiomyopathy     Cardiomyopathy, ischemic     Cataract     CHF (congestive heart  failure)     CKD (chronic kidney disease) stage 4, GFR 15-29 ml/min     Colon polyp 2011    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, with long-term current use of insulin 02/22/2018    COPD (chronic obstructive pulmonary disease)     COPD exacerbation 04/08/2018    Current mild episode of major depressive disorder without prior episode 01/25/2022    Defibrillator discharge     Dependence on renal dialysis 08/22/2023    Diabetes mellitus     Diabetes mellitus type II     Diabetes with neurologic complications     Edema     ESRD needing dialysis     Goiter     MNG    Hematuria, unspecified     HX: breast cancer     Hyperlipidemia     Hypertension     Hypocalcemia     Hypokalemia     Hyponatremia     Hyponatremia 04/02/2022    Iron deficiency anemia 05/16/2017    Iron deficiency anemia     Iron deficiency anemia     Left kidney mass     Meningioma     Microalbuminuria due to type 2 diabetes mellitus 01/26/2022    Osteoporosis, postmenopausal     Pneumonia 12/08/2019    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    Proteinuria 01/21/2019    Pseudomonas pneumonia     SCC (squamous cell carcinoma) 08/25/2022    right posterior neck    Skin cancer     s/p excision    Sleep apnea     CPAP    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/2015    mid forehead    Unspecified vitamin D deficiency     UTI (urinary tract infection) 04/02/2022    Ventricular tachycardia     Vitamin B12 deficiency     Vitamin D deficiency disease        Past Surgical History:   Procedure Laterality Date    BASAL CELL CARCINOMA EXCISION      posterior neck and nose    BREAST BIOPSY      BREAST CYST EXCISION Left     BREAST SURGERY      CARDIAC DEFIBRILLATOR PLACEMENT      x 2    CATARACT EXTRACTION W/  INTRAOCULAR LENS IMPLANT Bilateral     CHOLECYSTECTOMY      COLONOSCOPY N/A 11/5/2019    Procedure: COLONOSCOPY;  Surgeon: Boaz Botello MD;  Location: [facility] ([address] [place] [address]);  Service:  Endoscopy;  Laterality: N/A;  AICD - Med[employer] -     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    FRACTURE SURGERY      left elbow and wrist as a child    HYSTERECTOMY      INSERTION, CATHETER, DIALYSIS, PERITONEAL, LAPAROSCOPIC N/A 4/25/2023    Procedure: INSERTION, CATHETER, DIALYSIS, PERITONEAL, LAPAROSCOPIC;  Surgeon: Marcelo Isaac MD;  Location: [facility] OR;  Service: General;  Laterality: N/A;    LAPAROSCOPIC LYSIS OF ADHESIONS N/A 4/25/2023    Procedure: LYSIS, ADHESIONS, LAPAROSCOPIC;  Surgeon: Marcelo Isaac MD;  Location: [facility] OR;  Service: General;  Laterality: N/A;    MASTECTOMY Right     OMENTOPEXY, LAPAROSCOPIC N/A 4/25/2023    Procedure: OMENTOPEXY, LAPAROSCOPIC;  Surgeon: Marcelo Isaac MD;  Location: [facility] OR;  Service: General;  Laterality: N/A;    REPLACEMENT OF IMPLANTABLE CARDIOVERTER-DEFIBRILLATOR (ICD) GENERATOR N/A 12/17/2018    Procedure: REPLACEMENT, ICD GENERATOR;  Surgeon: Jan Mckeon MD;  Location: [facility] EP LAB;  Service: Cardiology;  Laterality: N/A;  DONNA, CRT-D gen change, MDT, MAC, SK, 3 Prep    REVISION OF SKIN POCKET FOR CARDIOVERTER-DEFIBRILLATOR  12/17/2018    Procedure: Revision, Skin Pocket, For Cardioverter-Defibrillator;  Surgeon: Jan Mckeon MD;  Location: [facility] EP LAB;  Service: Cardiology;;    SQUAMOUS CELL CARCINOMA EXCISION      remved from forehead    TONSILLECTOMY         Time Tracking:     PT Received On: 03/04/24  PT Start Time: 0942     PT Stop Time: 0955  PT Total Time (min): 13 min     Billable Minutes: Evaluation 13      03/04/2024

## 2024-03-04 NOTE — ASSESSMENT & PLAN NOTE
Chronic, controlled. Latest blood pressure and vitals reviewed-     Temp:  [97.5 °F (36.4 °C)-99.7 °F (37.6 °C)]   Pulse:  [66-70]   Resp:  [16-25]   BP: (127-169)/(58-70)   SpO2:  [93 %-97 %] .   Home meds for hypertension were reviewed and noted below.   Hypertension Medications               carvediloL (COREG) 25 MG tablet TAKE 2 TABLETS (50 MG TOTAL) BY MOUTH 2 (TWO) TIMES DAILY.    furosemide (LASIX) 40 MG tablet Take 1 tablet (40 mg total) by mouth once daily.    hydrALAZINE (APRESOLINE) 100 MG tablet Take 1 tablet (100 mg total) by mouth 2 (two) times daily.    nitroGLYCERIN (NITROSTAT) 0.4 MG SL tablet Place 0.4 mg under the tongue every 5 (five) minutes as needed for Chest pain.     valsartan (DIOVAN) 160 MG tablet Take 160 mg by mouth once daily.            While in the hospital, will manage blood pressure as follows; Continue home antihypertensive regimen    Will utilize p.r.n. blood pressure medication only if patient's blood pressure greater than 180/110 and she develops symptoms such as worsening chest pain or shortness of breath.

## 2024-03-04 NOTE — AI DETERIORATION ALERT
Artificial Intelligence Notification  James      Admit Date: 3/4/2024  LOS: 0  Code Status: Full Code   Date of Consult: 2024  : 1940  Age: 83 y.o.  Weight:   Wt Readings from Last 1 Encounters:   24 65.8 kg (145 lb)     Sex: female  Bed: [facility] A:   MRN: [id number]  Attending Physician: Danish Reyes,*  Primary Service: Networked reference to record PCT   Time AI Alert Received: ***  Time at Bedside: ***           ***      Vital Signs (Most Recent):  Temp: 98 °F (36.7 °C) (24 1004)  Pulse: 71 (24 1004)  Resp: 20 (24 1004)  BP: (!) 145/67 (24 1004)  SpO2: 97 % (24 1004) Vital Signs (24h Range):  Temp:  [97.5 °F (36.4 °C)-99.7 °F (37.6 °C)] 98 °F (36.7 °C)  Pulse:  [66-71] 71  Resp:  [16-35] 20  SpO2:  [93 %-98 %] 97 %  BP: (101-169)/(58-70) 145/67         This encounter was triggered by an Artificial Intelligence Notification.     Artificial Intelligence alert discussed with Primary team:  Name ***      Evaluation: ***    Disposition: ***

## 2024-03-04 NOTE — H&P
NEPHROLOGY CONSULT NOTE    HPI & INTERVAL HISTORY:    Past Medical History:   Diagnosis Date    Acute encephalopathy 2/29/2024    Acute hypoxemic respiratory failure 12/19/2019    Acute right-sided thoracic back pain 12/09/2019    Allergy     Asthma     Basal cell carcinoma     left forehead    Basal cell carcinoma     left nose    Basal cell carcinoma 05/20/2015    right nose    Basal cell carcinoma 12/22/2015    left lower post neck    Basal cell carcinoma 12/03/2019    left ant scalp     BCC (basal cell carcinoma of skin) 10/20/2022    Right alar groove    Bilateral pleural effusion     Bilateral renal cysts     Breast cancer     CAD (coronary artery disease)     Cardiomyopathy     Cardiomyopathy, ischemic     Cataract     CHF (congestive heart failure)     CKD (chronic kidney disease) stage 4, GFR 15-29 ml/min     Colon polyp 2011    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 edema, with long-term current use of insulin 02/22/2018    COPD (chronic obstructive pulmonary disease)     COPD exacerbation 04/08/2018    Current mild episode of major depressive disorder without prior episode 01/25/2022    Defibrillator discharge     Dependence on renal dialysis 08/22/2023    Diabetes mellitus     Diabetes mellitus type II     Diabetes with neurologic complications     Edema     ESRD needing dialysis     Goiter     MNG    Hematuria, unspecified     HX: breast cancer     Hyperlipidemia     Hypertension     Hypocalcemia     Hypokalemia     Hyponatremia     Hyponatremia 04/02/2022    Iron deficiency anemia 05/16/2017    Iron deficiency anemia     Iron deficiency anemia     Left kidney mass     Meningioma     Microalbuminuria due to type 2 diabetes mellitus 01/26/2022    Osteoporosis, postmenopausal     Pneumonia 12/08/2019    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    Proteinuria 01/21/2019    Pseudomonas pneumonia     SCC (squamous cell carcinoma)  08/25/2022    right posterior neck    Skin cancer     s/p excision    Sleep apnea     CPAP    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/2015    mid forehead    Unspecified vitamin D deficiency     UTI (urinary tract infection) 04/02/2022    Ventricular tachycardia     Vitamin B12 deficiency     Vitamin D deficiency disease       Past Surgical History:   Procedure Laterality Date    BASAL CELL CARCINOMA EXCISION      posterior neck and nose    BREAST BIOPSY      BREAST CYST EXCISION Left     BREAST SURGERY      CARDIAC DEFIBRILLATOR PLACEMENT      x 2    CATARACT EXTRACTION W/  INTRAOCULAR LENS IMPLANT Bilateral     CHOLECYSTECTOMY      COLONOSCOPY N/A 11/5/2019    Procedure: COLONOSCOPY;  Surgeon: Boaz Botello MD;  Location: [facility] ENDO [address]);  Service: Endoscopy;  Laterality: N/A;  AICD - Medtronic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    FRACTURE SURGERY      left elbow and wrist as a child    HYSTERECTOMY      INSERTION, CATHETER, DIALYSIS, PERITONEAL, LAPAROSCOPIC N/A 4/25/2023    Procedure: INSERTION, CATHETER, DIALYSIS, PERITONEAL, LAPAROSCOPIC;  Surgeon: Marcelo Isaac MD;  Location: [facility];  Service: General;  Laterality: N/A;    LAPAROSCOPIC LYSIS OF ADHESIONS N/A 4/25/2023    Procedure: LYSIS, ADHESIONS, LAPAROSCOPIC;  Surgeon: Marcelo Isaac MD;  Location: [facility] OR;  Service: General;  Laterality: N/A;    MASTECTOMY Right     OMENTOPEXY, LAPAROSCOPIC N/A 4/25/2023    Procedure: OMENTOPEXY, LAPAROSCOPIC;  Surgeon: Marcelo Isaac MD;  Location: [facility] OR;  Service: General;  Laterality: N/A;    REPLACEMENT OF IMPLANTABLE CARDIOVERTER-DEFIBRILLATOR (ICD) GENERATOR N/A 12/17/2018    Procedure: REPLACEMENT, ICD GENERATOR;  Surgeon: Jan Mckeon MD;  Location: [facility] EP LAB;  Service: Cardiology;  Laterality: N/A;  DONNA, CRT-D gen change, MDT, MAC, SK, 3 Prep    REVISION OF SKIN POCKET FOR CARDIOVERTER-DEFIBRILLATOR  12/17/2018    Procedure: Revision, Skin Pocket, For  Cardioverter-Defibrillator;  Surgeon: Jan Mckeon MD;  Location: [facility] EP LAB;  Service: Cardiology;;    SQUAMOUS CELL CARCINOMA EXCISION      remved from forehead    TONSILLECTOMY        Review of patient's allergies indicates:   Allergen Reactions    Iodine and iodide containing products Hives and Other (See Comments)     Not specified     Nifedipine      weakness      (Not in a hospital admission)      Social History     Socioeconomic History    Marital status:    Occupational History    Occupation: Homemaker     Employer: OTHER   Tobacco Use    Smoking status: Never     Passive exposure: Never    Smokeless tobacco: Never   Substance and Sexual Activity    Alcohol use: No     Alcohol/week: 0.0 standard drinks of alcohol    Drug use: No    Sexual activity: Yes     Partners: Male   Other Topics Concern    Are you pregnant or think you may be? No    Breast-feeding No     Social Determinants of Health     Financial Resource Strain: Low Risk  (8/22/2023)    Overall Financial Resource Strain (CARDIA)     Difficulty of Paying Living Expenses: Not hard at all   Food Insecurity: No Food Insecurity (8/22/2023)    Hunger Vital Sign     Worried About Running Out of Food in the Last Year: Never true     Ran Out of Food in the Last Year: Never true   Transportation Needs: No Transportation Needs (8/22/2023)    PRAPARE - Transportation     Lack of Transportation (Medical): No     Lack of Transportation (Non-Medical): No   Physical Activity: Inactive (8/22/2023)    Exercise Vital Sign     Days of Exercise per Week: 0 days     Minutes of Exercise per Session: 0 min   Stress: No Stress Concern Present (8/22/2023)    [place] [employer] - Occupational Stress Questionnaire     Feeling of Stress : Only a little   Social Connections: Moderately Isolated (8/22/2023)    Social Connection and Isolation Panel [NHANES]     Frequency of Communication with Friends and Family: More than three times a week      Frequency of Social Gatherings with Friends and Family: More than three times a week     Attends Rastafari Services: More than 4 times per year     Active Member of Clubs or Organizations: No     Attends Club or Organization Meetings: Never     Marital Status:    Housing Stability: Low Risk  (8/22/2023)    Housing Stability Vital Sign     Unable to Pay for Housing in the Last Year: No     Number of Places Lived in the Last Year: 1     Unstable Housing in the Last Year: No        MEDS   acetaminophen  650 mg Oral TID    aluminum-magnesium hydroxide-simethicone  30 mL Oral Once    And    LIDOcaine viscous HCl 2%  15 mL Oral Once    aspirin  81 mg Oral Daily    carvediloL  25 mg Oral BID    cyanocobalamin  100 mcg Oral Daily    furosemide (LASIX) injection  20 mg Intravenous Q12H    heparin (porcine)  5,000 Units Subcutaneous Q8H    hydrALAZINE  100 mg Oral BID    piperacillin-tazobactam (Zosyn) IV (PEDS and ADULTS) (extended infusion is not appropriate)  4.5 g Intravenous Q12H    pravastatin  40 mg Oral Daily    sevelamer carbonate  800 mg Oral TID    sodium bicarbonate  650 mg Oral TID    valsartan  160 mg Oral Daily    vitamin D  2,000 Units Oral Daily               CONTINOUS INFUSIONS:    No intake or output data in the 24 hours ending 03/04/24 0941     HEMODYNAMICS:    Temp:  [97.5 °F (36.4 °C)-99.7 °F (37.6 °C)] 97.5 °F (36.4 °C)  Pulse:  [66-70] 70  Resp:  [16-35] 35  SpO2:  [93 %-98 %] 98 %  BP: (101-169)/(58-70) 101/65   General :    Chest pain   SOB   No fever   No chills   No nausea   No vomiting   No diarrhea    Cardiology : pulse 66    Pulmonary  : diminished breath sounds    Abdomen distended    Extremities edema    Skin: dry   LABS   Lab Results   Component Value Date    WBC 12.07 03/04/2024    HGB 8.2 (L) 03/04/2024    HCT 23.9 (L) 03/04/2024    MCV 92 03/04/2024     03/04/2024        Recent Labs   Lab 03/04/24  0430   GLU 42*   CALCIUM 6.2*   ALBUMIN 2.6*   PROT 6.4   *   K 3.8  "  CO2 14*   CL 99   BUN 98*   CREATININE 5.0*   ALKPHOS 133   ALT 49*   AST 38   BILITOT 0.3      Lab Results   Component Value Date    .6 (H) 03/04/2024    CALCIUM 6.2 (LL) 03/04/2024    CAION 0.77 (L) 03/04/2024    PHOS 7.5 (H) 03/04/2024      Lab Results   Component Value Date    IRON 124 05/09/2023    TIBC 355 05/09/2023    FERRITIN 639 (H) 05/19/2023        ABG  No results for input(s): "PH", "PO2", "PCO2", "HCO3", "BE" in the last 168 hours.      IMAGING:  CXR    ASSESSMENT / PLAN  ESRD  UA protein 1+  K 3.8  Hyponatremia  Gap metabolic acidosis  Metabolic  bone disease  Secondary hyperparathyroidism    Hyperphosphatemia  Corrected Calcium 7.4 approximately  Poor nutrition  Albumin 2.6  Anemia multifactorial  HB 8.2    Troponin 0.034  Blood pressure 101/65  Echo-  Left Ventricle: The left ventricle is normal in size. There is concentric remodeling. Septal motion is consistent with pacing. There is reduced systolic function. Biplane (2D) method of discs ejection fraction is 52%.    Right Ventricle: Normal right ventricular cavity size. Systolic function is borderline low. TAPSE is 1.68 cm. Pacemaker lead present in the ventricle.    Left Atrium: Left atrium is moderately dilated.    Right Atrium: Lead present in the right atrium.    Aortic Valve: There is mild aortic valve sclerosis. Mildly restricted motion. There is mild stenosis. Aortic valve area by VTI is 1.55 cm². Aortic valve peak velocity is 1.45 m/s. Mean gradient is 5 mmHg. The dimensionless index is 0.57.    Mitral Valve: There is moderate regurgitation.    Tricuspid Valve: There is moderate regurgitation.    Pulmonary Artery: The estimated pulmonary artery systolic pressure is 69 mmHg.    IVC/SVC: Intermediate venous pressure at 8 mmHg.    Pericardium: Left pleural effusion.    CXR  There is prominence of the pulmonary vascular.  There is bilateral pattern of interstitial and patchy alveolar infiltrates.  There is progression of " the aforementioned when compared to the prior study.     There is opacity at the left lung base, this likely relates to pleural effusion, greater opacity peripherally on the left may relate to increasing pleural fluid and or peripheral pulmonary infiltrate/airspace disease.     There is no significant pleural effusion on the right and there is no pneumothorax.  Weight daily   I  and O  Peritoneal dialysis  Cell count, culture peritoneal fluid  Discussed with peritoneal dialysis nurse

## 2024-03-04 NOTE — ASSESSMENT & PLAN NOTE
Patient's anemia is currently controlled. Has not received any PRBCs to date. Etiology likely d/t chronic disease due to Chronic Kidney Disease/ESRD  Current CBC reviewed-   Lab Results   Component Value Date    HGB 8.2 (L) 03/04/2024    HCT 23.9 (L) 03/04/2024     Monitor serial CBC and transfuse if patient becomes hemodynamically unstable, symptomatic or H/H drops below 7/21.

## 2024-03-04 NOTE — PHARMACY MED REC
"[facility]           Pharmacy  Admission Medication History     The home medication history was taken by Kendra OrtizD.      Medication history obtained from Medications listed below were obtained from: Patient/family    Based on information gathered for medication list, you may go to "Admission" then "Reconcile Home Medications" tabs to review and/or act upon those items.     The home medication list has been updated by the Pharmacy department.   Please read ALL comments highlighted in yellow.   Please address this information as you see fit.    Feel free to contact us if you have any questions or require assistance.    The medications listed below were removed from the home medication list.  Please reorder if appropriate:    No discrepancy noted      Potential issues to be addressed PRIOR TO DISCHARGE      No current facility-administered medications on file prior to encounter.     Current Outpatient Medications on File Prior to Encounter   Medication Sig Dispense Refill    acetaminophen (TYLENOL) 500 MG tablet Take 500 mg by mouth as needed.      albuterol (PROVENTIL/VENTOLIN HFA) 90 mcg/actuation inhaler Inhale 2 puffs into the lungs every 6 (six) hours as needed for Wheezing. Rescue 18 g 12    albuterol-ipratropium (DUO-NEB) 2.5 mg-0.5 mg/3 mL nebulizer solution Take 3 mLs by nebulization every 4 (four) hours as needed for Wheezing. 270 mL 12    amoxicillin-clavulanate 250-125mg (AUGMENTIN) 250-125 mg Tab Take 1 tablet by mouth every 12 (twelve) hours. for 2 days 4 tablet 0    aspirin 81 MG Chew Take 81 mg by mouth once daily.      blood sugar diagnostic (ACCU-CHEK HECTOR) Strp Uses Accu-Check Hector meter to test BG 4x/day 400 strip 6    calcitRIOL (ROCALTROL) 0.5 MCG Cap Take 1 capsule (0.5 mcg total) by mouth once daily. 30 capsule 11    carvediloL (COREG) 25 MG tablet TAKE 2 TABLETS (50 MG TOTAL) BY MOUTH 2 (TWO) TIMES DAILY. 360 tablet 3    cholecalciferol, vitamin D3, (VITAMIN " "D3) 50 mcg (2,000 unit) Tab Take 1 tablet by mouth once daily.       cyanocobalamin (VITAMIN B-12) 100 MCG tablet Take 100 mcg by mouth once daily.      dulaglutide (TRULICITY) 1.5 mg/0.5 mL pen injector Inject 1.5 mg into the skin every 7 days. 4 Pen 11    epoetin jamaica-epbx (RETACRIT) 10,000 unit/mL imjection Inject 1 mL (10,000 Units total) into the skin every 30 days. 1 mL 11    ergocalciferol (ERGOCALCIFEROL) 50,000 unit Cap Take 50,000 Units by mouth every 7 days.      estradioL (ESTRACE) 0.01 % (0.1 mg/gram) vaginal cream PLACE 1 GRAM VAGINALLY EVERY OTHER DAY 42.5 g 3    estradioL (ESTRING) 2 mg (7.5 mcg /24 hour) vaginal ring Place 2 mg vaginally every 3 (three) months. 1 each 3    fluticasone propionate (FLONASE) 50 mcg/actuation nasal spray 1 spray by Each Nostril route daily as needed.      furosemide (LASIX) 40 MG tablet Take 1 tablet (40 mg total) by mouth once daily. 90 tablet 3    hydrALAZINE (APRESOLINE) 100 MG tablet Take 1 tablet (100 mg total) by mouth 2 (two) times daily. 90 tablet 3    insulin (LANTUS SOLOSTAR U-100 INSULIN) glargine 100 units/mL SubQ pen Inject 12 Units into the skin every evening.      lancets (ACCU-CHEK SOFTCLIX LANCETS) Misc Uses Accu-Chek Angelica meter to test BG 2x/day 400 each 6    montelukast (SINGULAIR) 10 mg tablet Take 1 tablet (10 mg total) by mouth once daily. 90 tablet 3    nitroGLYCERIN (NITROSTAT) 0.4 MG SL tablet Place 0.4 mg under the tongue every 5 (five) minutes as needed for Chest pain.       omega-3 fatty acids 500 mg Cap Take 1 capsule by mouth Daily.      OZEMPIC 0.25 mg or 0.5 mg (2 mg/3 mL) pen injector Inject 0.25 mg into the skin every Wednesday.      pen needle, diabetic (BD ULTRA-FINE MINI PEN NEEDLE) 31 gauge x 3/16" Ndle Use with insulin twice a day. 400 each 3    pravastatin (PRAVACHOL) 40 MG tablet Take 1 tablet (40 mg total) by mouth once daily. 90 tablet 3    sevelamer carbonate (RENVELA) 800 mg Tab Take 1 tablet (800 mg total) by mouth 3 " (three) times daily. 90 tablet 3    sodium bicarbonate 650 MG tablet Take 1 tablet (650 mg total) by mouth 3 (three) times daily. 90 tablet 11    valsartan (DIOVAN) 160 MG tablet Take 160 mg by mouth once daily.         Please address this information as you see fit.  Feel free to contact us if you have any questions or require assistance.    Gino Gray, PharmD  [phone]                 .

## 2024-03-04 NOTE — SUBJECTIVE & OBJECTIVE
Past Medical History:   Diagnosis Date    Acute encephalopathy 2/29/2024    Acute hypoxemic respiratory failure 12/19/2019    Acute right-sided thoracic back pain 12/09/2019    Allergy     Asthma     Basal cell carcinoma     left forehead    Basal cell carcinoma     left nose    Basal cell carcinoma 05/20/2015    right nose    Basal cell carcinoma 12/22/2015    left lower post neck    Basal cell carcinoma 12/03/2019    left ant scalp     BCC (basal cell carcinoma of skin) 10/20/2022    Right alar groove    Bilateral pleural effusion     Bilateral renal cysts     Breast cancer     CAD (coronary artery disease)     Cardiomyopathy     Cardiomyopathy, ischemic     Cataract     CHF (congestive heart failure)     CKD (chronic kidney disease) stage 4, GFR 15-29 ml/min     Colon polyp 2011    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, with long-term current use of insulin 02/22/2018    COPD (chronic obstructive pulmonary disease)     COPD exacerbation 04/08/2018    Current mild episode of major depressive disorder without prior episode 01/25/2022    Defibrillator discharge     Dependence on renal dialysis 08/22/2023    Diabetes mellitus     Diabetes mellitus type II     Diabetes with neurologic complications     Edema     ESRD needing dialysis     Goiter     MNG    Hematuria, unspecified     HX: breast cancer     Hyperlipidemia     Hypertension     Hypocalcemia     Hypokalemia     Hyponatremia     Hyponatremia 04/02/2022    Iron deficiency anemia 05/16/2017    Iron deficiency anemia     Iron deficiency anemia     Left kidney mass     Meningioma     Microalbuminuria due to type 2 diabetes mellitus 01/26/2022    Osteoporosis, postmenopausal     Pneumonia 12/08/2019    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    Proteinuria 01/21/2019    Pseudomonas pneumonia     SCC (squamous cell carcinoma) 08/25/2022    right posterior neck    Skin cancer     s/p  excision    Sleep apnea     CPAP    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/2015    mid forehead    Unspecified vitamin D deficiency     UTI (urinary tract infection) 04/02/2022    Ventricular tachycardia     Vitamin B12 deficiency     Vitamin D deficiency disease        Past Surgical History:   Procedure Laterality Date    BASAL CELL CARCINOMA EXCISION      posterior neck and nose    BREAST BIOPSY      BREAST CYST EXCISION Left     BREAST SURGERY      CARDIAC DEFIBRILLATOR PLACEMENT      x 2    CATARACT EXTRACTION W/  INTRAOCULAR LENS IMPLANT Bilateral     CHOLECYSTECTOMY      COLONOSCOPY N/A 11/5/2019    Procedure: COLONOSCOPY;  Surgeon: Boaz Botello MD;  Location: [address]);  Service: Endoscopy;  Laterality: N/A;  AICD - [employer] -     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    FRACTURE SURGERY      left elbow and wrist as a child    HYSTERECTOMY      INSERTION, CATHETER, DIALYSIS, PERITONEAL, LAPAROSCOPIC N/A 4/25/2023    Procedure: INSERTION, CATHETER, DIALYSIS, PERITONEAL, LAPAROSCOPIC;  Surgeon: Marcelo Isaac MD;  Location: [facility];  Service: General;  Laterality: N/A;    LAPAROSCOPIC LYSIS OF ADHESIONS N/A 4/25/2023    Procedure: LYSIS, ADHESIONS, LAPAROSCOPIC;  Surgeon: Marcelo Isaac MD;  Location: [facility] OR;  Service: General;  Laterality: N/A;    MASTECTOMY Right     OMENTOPEXY, LAPAROSCOPIC N/A 4/25/2023    Procedure: OMENTOPEXY, LAPAROSCOPIC;  Surgeon: Marcelo Isaac MD;  Location: [facility];  Service: General;  Laterality: N/A;    REPLACEMENT OF IMPLANTABLE CARDIOVERTER-DEFIBRILLATOR (ICD) GENERATOR N/A 12/17/2018    Procedure: REPLACEMENT, ICD GENERATOR;  Surgeon: Jan Mckeon MD;  Location: [facility] EP LAB;  Service: Cardiology;  Laterality: N/A;  DONNA, CRT-D gen change, MDT, MAC, SK, 3 Prep    REVISION OF SKIN POCKET FOR CARDIOVERTER-DEFIBRILLATOR  12/17/2018    Procedure: Revision, Skin Pocket, For Cardioverter-Defibrillator;  Surgeon: Jan Mckeon MD;  Location: [facility] EP LAB;   Service: Cardiology;;    SQUAMOUS CELL CARCINOMA EXCISION      remved from forehead    TONSILLECTOMY         Review of patient's allergies indicates:   Allergen Reactions    Iodine and iodide containing products Hives and Other (See Comments)     Not specified     Nifedipine      weakness       No current facility-administered medications on file prior to encounter.     Current Outpatient Medications on File Prior to Encounter   Medication Sig    acetaminophen (TYLENOL) 500 MG tablet Take 500 mg by mouth as needed.    albuterol (PROVENTIL/VENTOLIN HFA) 90 mcg/actuation inhaler Inhale 2 puffs into the lungs every 6 (six) hours as needed for Wheezing. Rescue    albuterol-ipratropium (DUO-NEB) 2.5 mg-0.5 mg/3 mL nebulizer solution Take 3 mLs by nebulization every 4 (four) hours as needed for Wheezing.    amoxicillin-clavulanate 250-125mg (AUGMENTIN) 250-125 mg Tab Take 1 tablet by mouth every 12 (twelve) hours. for 2 days    aspirin 81 MG Chew Take 81 mg by mouth once daily.    blood sugar diagnostic (ACCU-CHEK HECTOR) Strp Uses Accu-Check Hector meter to test BG 4x/day    calcitRIOL (ROCALTROL) 0.5 MCG Cap Take 1 capsule (0.5 mcg total) by mouth once daily.    carvediloL (COREG) 25 MG tablet TAKE 2 TABLETS (50 MG TOTAL) BY MOUTH 2 (TWO) TIMES DAILY.    cholecalciferol, vitamin D3, (VITAMIN D3) 50 mcg (2,000 unit) Tab Take 1 tablet by mouth once daily.     cyanocobalamin (VITAMIN B-12) 100 MCG tablet Take 100 mcg by mouth once daily.    dulaglutide (TRULICITY) 1.5 mg/0.5 mL pen injector Inject 1.5 mg into the skin every 7 days.    epoetin jamaica-epbx (RETACRIT) 10,000 unit/mL imjection Inject 1 mL (10,000 Units total) into the skin every 30 days.    ergocalciferol (ERGOCALCIFEROL) 50,000 unit Cap Take 50,000 Units by mouth every 7 days.    estradioL (ESTRACE) 0.01 % (0.1 mg/gram) vaginal cream PLACE 1 GRAM VAGINALLY EVERY OTHER DAY    estradioL (ESTRING) 2 mg (7.5 mcg /24 hour) vaginal ring Place 2 mg vaginally every 3  "(three) months.    fluticasone propionate (FLONASE) 50 mcg/actuation nasal spray 1 spray by Each Nostril route daily as needed.    furosemide (LASIX) 40 MG tablet Take 1 tablet (40 mg total) by mouth once daily.    hydrALAZINE (APRESOLINE) 100 MG tablet Take 1 tablet (100 mg total) by mouth 2 (two) times daily.    insulin (LANTUS SOLOSTAR U-100 INSULIN) glargine 100 units/mL SubQ pen Inject 12 Units into the skin every evening.    lancets (ACCU-CHEK SOFTCLIX LANCETS) Misc Uses Accu-Chek Angelica meter to test BG 2x/day    montelukast (SINGULAIR) 10 mg tablet Take 1 tablet (10 mg total) by mouth once daily.    nitroGLYCERIN (NITROSTAT) 0.4 MG SL tablet Place 0.4 mg under the tongue every 5 (five) minutes as needed for Chest pain.     omega-3 fatty acids 500 mg Cap Take 1 capsule by mouth Daily.    OZEMPIC 0.25 mg or 0.5 mg (2 mg/3 mL) pen injector Inject 0.25 mg into the skin every Wednesday.    pen needle, diabetic (BD ULTRA-FINE MINI PEN NEEDLE) 31 gauge x 3/16" Ndle Use with insulin twice a day.    pravastatin (PRAVACHOL) 40 MG tablet Take 1 tablet (40 mg total) by mouth once daily.    sevelamer carbonate (RENVELA) 800 mg Tab Take 1 tablet (800 mg total) by mouth 3 (three) times daily.    sodium bicarbonate 650 MG tablet Take 1 tablet (650 mg total) by mouth 3 (three) times daily.    valsartan (DIOVAN) 160 MG tablet Take 160 mg by mouth once daily.     Family History       Problem Relation (Age of Onset)    Asthma Mother    Cancer Brother, Daughter, Son    Cardiomyopathy Father    Cerebral aneurysm Brother    Diabetes Father, Sister, Brother, Brother, Brother, Brother, Daughter, Son, Son    Heart attack Sister, Brother    Heart disease Sister, Brother, Brother, Brother    Hypertension Mother, Brother, Brother    Melanoma Daughter    No Known Problems Maternal Grandmother, Maternal Grandfather, Paternal Grandmother, Paternal Grandfather    Obesity Daughter    Stroke Mother          Tobacco Use    Smoking status: Never "     Passive exposure: Never    Smokeless tobacco: Never   Substance and Sexual Activity    Alcohol use: No     Alcohol/week: 0.0 standard drinks of alcohol    Drug use: No    Sexual activity: Yes     Partners: Male     Review of Systems   Constitutional: Positive for malaise/fatigue. Negative for chills, decreased appetite, diaphoresis, fever, weight gain and weight loss.   Cardiovascular:  Positive for chest pain. Negative for claudication, dyspnea on exertion, irregular heartbeat, leg swelling, near-syncope, orthopnea, palpitations and paroxysmal nocturnal dyspnea.   Respiratory:  Negative for cough, shortness of breath, snoring, sputum production and wheezing.    Endocrine: Negative for cold intolerance, heat intolerance, polydipsia, polyphagia and polyuria.   Skin:  Negative for color change, dry skin, itching, nail changes and poor wound healing.   Musculoskeletal:  Negative for back pain, gout, joint pain and joint swelling.   Gastrointestinal:  Negative for bloating, abdominal pain, constipation, diarrhea, hematemesis, hematochezia, melena, nausea and vomiting.   Genitourinary:  Negative for dysuria, hematuria and nocturia.   Neurological:  Positive for weakness. Negative for dizziness, headaches, light-headedness, numbness and paresthesias.   Psychiatric/Behavioral:  Negative for altered mental status, depression and memory loss.      Objective:     Vital Signs (Most Recent):  Temp: 98 °F (36.7 °C) (03/04/24 1004)  Pulse: 71 (03/04/24 1004)  Resp: 20 (03/04/24 1004)  BP: (!) 145/67 (03/04/24 1004)  SpO2: 97 % (03/04/24 1004) Vital Signs (24h Range):  Temp:  [97.5 °F (36.4 °C)-99.7 °F (37.6 °C)] 98 °F (36.7 °C)  Pulse:  [66-71] 71  Resp:  [16-35] 20  SpO2:  [93 %-98 %] 97 %  BP: (101-169)/(58-70) 145/67     Weight: 65.8 kg (145 lb)  Body mass index is 25.69 kg/m².    SpO2: 97 %       No intake or output data in the 24 hours ending 03/04/24 1012    Lines/Drains/Airways       Peripheral Intravenous Line   Duration                  Peripheral IV - Single Lumen 03/04/24 0440 20 G Posterior;Right Hand <1 day                     Physical Exam  Constitutional:       General: She is not in acute distress.     Appearance: She is well-developed.   Cardiovascular:      Rate and Rhythm: Normal rate and regular rhythm.      Heart sounds: No murmur heard.     No gallop.   Pulmonary:      Effort: Pulmonary effort is normal. No respiratory distress.      Breath sounds: Normal breath sounds. No wheezing.   Abdominal:      General: Bowel sounds are normal. There is no distension.      Palpations: Abdomen is soft.      Tenderness: There is no abdominal tenderness.   Skin:     General: Skin is warm and dry.   Neurological:      Mental Status: She is alert and oriented to person, place, and time.      Significant Labs: BMP:   Recent Labs   Lab 03/04/24  0430 03/04/24  0550   GLU 42*  --    *  --    K 3.8  --    CL 99  --    CO2 14*  --    BUN 98*  --    CREATININE 5.0*  --    CALCIUM 6.2*  --    MG  --  1.8   , CBC   Recent Labs   Lab 03/04/24  0430   WBC 12.07   HGB 8.2*   HCT 23.9*      , and Troponin   Recent Labs   Lab 03/04/24  0430   TROPONINI 0.034*       Significant Imaging: Echocardiogram: Transthoracic echo (TTE) complete (Cupid Only):   Results for orders placed or performed during the hospital encounter of 02/29/24   Echo   Result Value Ref Range    RA Width 4.10 cm    LA volume (mod) 63.40 cm3    Left Atrium Major Axis 5.13 cm    Left Atrium Minor Axis 5.32 cm    RA Major Axis 4.13 cm    LV Diastolic Volume 79.92 mL    LV Systolic Volume 29.10 mL    PV Peak D Norbert 0.23 m/s    PV Peak S Norbert 0.70 m/s    MV Peak A Norbert 0.90 m/s    MV stenosis pressure 1/2 time 52.72 ms    MV VTI 28.4 cm    TR Max Norbert 3.89 m/s    MV Peak E Norbert 1.23 m/s    MV peak gradient 7 mmHg    Mr max norbert 5.48 m/s    Ao VTI 35.80 cm    Ao peak norbert 1.45 m/s    LVOT peak VTI 20.40 cm    LVOT peak norbert 0.80 m/s    LVOT diameter 1.86 cm    E wave  deceleration time 181.80 msec    MV mean gradient 4 mmHg    AV mean gradient 5 mmHg    RV S' 15.15 cm/s    TAPSE 1.68 cm    RVDD 2.99 cm    LA size 3.87 cm    Ascending aorta 2.27 cm    STJ 2.21 cm    Sinus 2.31 cm    LVIDs 2.78 2.1 - 4.0 cm    Posterior Wall 1.03 0.6 - 1.1 cm    IVS 0.85 0.6 - 1.1 cm    LVIDd 4.23 3.5 - 6.0 cm    PV PEAK VELOCITY 0.88 m/s    TDI LATERAL 0.08 m/s    Pulmonary Valve Mean Velocity 0.71 m/s    Left Ventricular Outflow Tract Mean Gradient 1.57 mmHg    Left Ventricular Outflow Tract Mean Velocity 0.60 cm/s    Wilson's Biplane MOD Ejection Fraction 52 %    IVC diameter 2.07 cm    TDI SEPTAL 0.07 m/s    LV LATERAL E/E' RATIO 15.38 m/s    LV SEPTAL E/E' RATIO 17.57 m/s    FS 34 28 - 44 %    LV mass 127.41 g    ZLVIDD -0.99     ZLVIDS -0.31     Left Ventricle Relative Wall Thickness 0.49 cm    AV valve area 1.55 cm²    AV Velocity Ratio 0.55     AV index (prosthetic) 0.57     MV valve area p 1/2 method 4.17 cm2    MV valve area by continuity eq 1.95 cm2    PV peak gradient 3 mmHg    E/A ratio 1.37     Mean e' 0.08 m/s    Pulm vein S/D ratio 3.04     LVOT area 2.7 cm2    LVOT stroke volume 55.40 cm3    AV peak gradient 8 mmHg    E/E' ratio 16.40 m/s    LV Systolic Volume Index 17.4 mL/m2    LV Diastolic Volume Index 47.86 mL/m2    LV Mass Index 76 g/m2    Triscuspid Valve Regurgitation Peak Gradient 61 mmHg    LA Volume Index (Mod) 38.0 mL/m2    JOSAFAT by Velocity Ratio 1.50 cm²    BSA 1.69 m2    LA Volume Index 45.3 mL/m2    LA volume 75.60 cm3    LA WIDTH 4.4 cm    TV resting pulmonary artery pressure 69 mmHg    RV TB RVSP 12 mmHg    Est. RA pres 8 mmHg    Narrative      Left Ventricle: The left ventricle is normal in size. There is   concentric remodeling. Septal motion is consistent with pacing. There is   reduced systolic function. Biplane (2D) method of discs ejection fraction   is 52%.    Right Ventricle: Normal right ventricular cavity size. Systolic   function is borderline low. TAPSE  is 1.68 cm. Pacemaker lead present in   the ventricle.    Left Atrium: Left atrium is moderately dilated.    Right Atrium: Lead present in the right atrium.    Aortic Valve: There is mild aortic valve sclerosis. Mildly restricted   motion. There is mild stenosis. Aortic valve area by VTI is 1.55 cm².   Aortic valve peak velocity is 1.45 m/s. Mean gradient is 5 mmHg. The   dimensionless index is 0.57.    Mitral Valve: There is moderate regurgitation.    Tricuspid Valve: There is moderate regurgitation.    Pulmonary Artery: The estimated pulmonary artery systolic pressure is   69 mmHg.    IVC/SVC: Intermediate venous pressure at 8 mmHg.    Pericardium: Left pleural effusion.

## 2024-03-04 NOTE — TREATMENT PLAN
Seen and examined.  Patient reports near constant chest pain following her discharge, worse with deep breath or cough.  No development of fevers, chills.  Still does not feel back to baseline but has not had meaningful decompensation since discharge aside from chest pain.  Initial troponin in the ED mildly elevated.  I suspect that this is purely troponin leak in the setting ESRD and pneumonia.  Have put in for repeat troponin but have very low suspicion for ACS.  It is far more likely that the patient is experiencing pleuritic chest pain secondary to her pneumonia.  I am also not convinced that her pneumonia is worsening given that clinically she is doing better despite consolidative change on imaging.  X-ray does confirm that pneumonia is present; anticipate that she will be able to complete course of oral antibiotics as outpatient.  More concerningly, she did again have evidence of hypoglycemia on her admit labs.  Will need to downwardly adjust her insulin regimen again prior to discharge.  I anticipate that patient may be able to discharge later this afternoon if cardiac workup remains negative as anticipated.  Will consult Nephrology as it is her day for dialysis.    Danish Reyes MD  [facility]

## 2024-03-04 NOTE — ASSESSMENT & PLAN NOTE
- presented with chest pain atypical in nature  - echo 3/1/2024 with EF 52% and mild AS; history of NICM since 2004 with AICD in place  -  CXR reviewed; concerned about mild volume overload  - on Lasix 40mg po daily as well as PD at home; agree with IV Lasix 20mg BID with no output documented so far; anticipate PD   - on Coreg, Losartan and Hydralazine; initial /70 with trend down to 100s  - monitor strict I&Os and daily weights

## 2024-03-04 NOTE — PT/OT/SLP EVAL
Occupational Therapy   Evaluation    Name: Myesha Quinones  MRN: [id number]  Admitting Diagnosis: HCAP (healthcare-associated pneumonia)  Recent Surgery: * No surgery found *      Recommendations:     Discharge Recommendations:  (TBD)  Discharge Equipment Recommendations:  none  Barriers to discharge:  None    Assessment:     Myesha Quinones is a 83 y.o. female with a medical diagnosis of HCAP (healthcare-associated pneumonia).  She presents with The primary encounter diagnosis was HCAP (healthcare-associated pneumonia). Diagnoses of Chest pain, CAP (community acquired pneumonia), Chest pain, unspecified type [R07.9], and Acute on chronic combined systolic and diastolic heart failure [I50.43] were also pertinent to this visit.  . Performance deficits affecting function: weakness, impaired endurance, impaired self care skills, impaired functional mobility, gait instability, impaired balance, pain, impaired cardiopulmonary response to activity.      Pt would benefit from cont OT services in order to maximize functional independence. Recommendations TBD at this time. Pt know from previous admission; pt reporting chest pain and SOB during limited evaluation at this time. Will progress pt as able.     Rehab Prognosis: Good; patient would benefit from acute skilled OT services to address these deficits and reach maximum level of function.       Plan:     Patient to be seen 5 x/week to address the above listed problems via self-care/home management, therapeutic activities, therapeutic exercises  Plan of Care Expires: 04/04/24  Plan of Care Reviewed with: patient, daughter    Subjective     Chief Complaint: chest pain; feeling ill   Patient/Family Comments/goals: none stated     Occupational Profile:  Living Environment: alone, SSH, no steps to enter, WIS   Previous level of function: pt recently d/c'd from hospital on 3/2; was limited in axs at home 2/2 reports of remaining ill and not feeling well; prior to recent  hospitalization, pt was mod I with use of rollator and SBQC in community   Equipment Used at Home: wheelchair, walker, rolling, rollator, shower chair, bedside commode, cane, straight  Assistance upon Discharge: from family, however, pt appears to require increased assist at this time     Pain/Comfort:  Pain Rating 1: 10/10  Location - Side 1: Left  Location - Orientation 1: generalized  Location 1: chest  Pain Addressed 1: Reposition, Distraction, Cessation of Activity  Pain Rating Post-Intervention 1: 10/10    Patients cultural, spiritual, Gnosticism conflicts given the current situation:      Objective:     Communicated with: nslenin prior to session.  Patient found supine with   upon OT entry to room.    General Precautions: Standard, fall  Orthopedic Precautions: N/A  Braces: N/A  Respiratory Status: Nasal cannula, flow 3 L/min    Occupational Performance:    Bed Mobility:    Patient completed Scooting/Bridging with minimum assistance  Patient completed Supine to Sit with moderate assistance  Patient completed Sit to Supine with minimum assistance and moderate assistance    Functional Mobility/Transfers:  Patient completed Sit <> Stand Transfer with minimum assistance  with  no assistive device   Functional Mobility: Min A with HHA stretcher to bed     Activities of Daily Living:  N/A     Cognitive/Visual Perceptual:  WFL  Appears fatigued     Physical Exam:  Balance:    -       fair seated; fair - standing   Postural examination/scapula alignment:    -       Rounded shoulders  -       Forward head  Skin integrity: Wound/ bruising IV R hand   Edema:  Mild R hand   Dominant hand:    -       right  Upper Extremity Range of Motion:   BUE grossly WFL for pt's needs ; not formally assessed this session 2/2 chest pain   Upper Extremity Strength:  not formally assessed at this time 2/2 chest pain   Strength:  WFL for pt's needs     [facility] 6 Click ADL:  AMPAC Total Score: 17    Treatment & Education:  Pt found supine in  stretcher; transport bringing pt to room from EOB   Pt reports chest pain with movement and at rest   Assisted pt with standing from stretcher to hospital bed ; stood subsequent trial from EOB for lateral steps to HOB   Assisted to supine     Patient left supine with all lines intact, call button in reach, and nsg and family present    GOALS:   Multidisciplinary Problems       Occupational Therapy Goals          Problem: Occupational Therapy    Goal Priority Disciplines Outcome Interventions   Occupational Therapy Goal     OT, PT/OT Ongoing, Progressing    Description: Goals to be met by: 4/4/23     Patient will increase functional independence with ADLs by performing:    LE Dressing with Modified [place].  Grooming while standing with Modified [place].  Toileting from toilet with Modified [place] for hygiene and clothing management.   Supine to sit with Modified [place].  Step transfer with Modified [place]  Toilet transfer to toilet with Modified [place].  Increased functional strength to WFL for self care skills and functional mobility.  Upper extremity exercise program x10 reps per handout, with independence.                         History:     Past Medical History:   Diagnosis Date    Acute encephalopathy 2/29/2024    Acute hypoxemic respiratory failure 12/19/2019    Acute right-sided thoracic back pain 12/09/2019    Allergy     Asthma     Basal cell carcinoma     left forehead    Basal cell carcinoma     left nose    Basal cell carcinoma 05/20/2015    right nose    Basal cell carcinoma 12/22/2015    left lower post neck    Basal cell carcinoma 12/03/2019    left ant scalp     BCC (basal cell carcinoma of skin) 10/20/2022    Right alar groove    Bilateral pleural effusion     Bilateral renal cysts     Breast cancer     CAD (coronary artery disease)     Cardiomyopathy     Cardiomyopathy, ischemic     Cataract     Chest pain 3/4/2024    CHF (congestive heart failure)     CKD  (chronic kidney disease) stage 4, GFR 15-29 ml/min     Colon polyp 2011    Controlled type 2 diabetes mellitus 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, with long-term current use of insulin 02/22/2018    COPD (chronic obstructive pulmonary disease)     COPD exacerbation 04/08/2018    Current mild episode of major depressive disorder without prior episode 01/25/2022    Defibrillator discharge     Dependence on renal dialysis 08/22/2023    Diabetes mellitus     Diabetes mellitus type II     Diabetes with neurologic complications     Edema     ESRD needing dialysis     Goiter     MNG    Hematuria, unspecified     HX: breast cancer     Hyperlipidemia     Hypertension     Hypocalcemia     Hypokalemia     Hyponatremia     Hyponatremia 04/02/2022    Iron deficiency anemia 05/16/2017    Iron deficiency anemia     Iron deficiency anemia     Left kidney mass     Meningioma     Microalbuminuria due to type 2 diabetes mellitus 01/26/2022    Osteoporosis, postmenopausal     Pneumonia 12/08/2019    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    Proteinuria 01/21/2019    Pseudomonas pneumonia     SCC (squamous cell carcinoma) 08/25/2022    right posterior neck    Skin cancer     s/p excision    Sleep apnea     CPAP    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/2015    mid forehead    Unspecified vitamin D deficiency     UTI (urinary tract infection) 04/02/2022    Ventricular tachycardia     Vitamin B12 deficiency     Vitamin D deficiency disease          Past Surgical History:   Procedure Laterality Date    BASAL CELL CARCINOMA EXCISION      posterior neck and nose    BREAST BIOPSY      BREAST CYST EXCISION Left     BREAST SURGERY      CARDIAC DEFIBRILLATOR PLACEMENT      x 2    CATARACT EXTRACTION W/  INTRAOCULAR LENS IMPLANT Bilateral     CHOLECYSTECTOMY      COLONOSCOPY N/A 11/5/2019    Procedure: COLONOSCOPY;  Surgeon: Boaz Botello MD;  Location: [facility] ([address] [place] [address]);  Service: Endoscopy;   Laterality: N/A;  AICD - [employer] -     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    FRACTURE SURGERY      left elbow and wrist as a child    HYSTERECTOMY      INSERTION, CATHETER, DIALYSIS, PERITONEAL, LAPAROSCOPIC N/A 4/25/2023    Procedure: INSERTION, CATHETER, DIALYSIS, PERITONEAL, LAPAROSCOPIC;  Surgeon: Marcelo Isaac MD;  Location: [facility] OR;  Service: General;  Laterality: N/A;    LAPAROSCOPIC LYSIS OF ADHESIONS N/A 4/25/2023    Procedure: LYSIS, ADHESIONS, LAPAROSCOPIC;  Surgeon: Marcelo Isaac MD;  Location: [facility] OR;  Service: General;  Laterality: N/A;    MASTECTOMY Right     OMENTOPEXY, LAPAROSCOPIC N/A 4/25/2023    Procedure: OMENTOPEXY, LAPAROSCOPIC;  Surgeon: Marcelo Isaac MD;  Location: [facility] OR;  Service: General;  Laterality: N/A;    REPLACEMENT OF IMPLANTABLE CARDIOVERTER-DEFIBRILLATOR (ICD) GENERATOR N/A 12/17/2018    Procedure: REPLACEMENT, ICD GENERATOR;  Surgeon: Jan Mckeon MD;  Location: [facility] EP LAB;  Service: Cardiology;  Laterality: N/A;  DONNA, CRT-D gen change, MDT, MAC, SK, 3 Prep    REVISION OF SKIN POCKET FOR CARDIOVERTER-DEFIBRILLATOR  12/17/2018    Procedure: Revision, Skin Pocket, For Cardioverter-Defibrillator;  Surgeon: Jan Mckeon MD;  Location: [facility] EP LAB;  Service: Cardiology;;    SQUAMOUS CELL CARCINOMA EXCISION      remved from forehead    TONSILLECTOMY         Time Tracking:     OT Date of Treatment: 03/04/24  OT Start Time: 0943  OT Stop Time: 0956  OT Total Time (min): 13 min    Billable Minutes:Evaluation 13    3/4/2024

## 2024-03-04 NOTE — ED NOTES
Patient connected to tele box, waiting for transport. Patient ate 25% of dietary tray and phosphate binder administered

## 2024-03-04 NOTE — HPI
83-year-old woman with ESRD on PD, type 2 diabetes, hypertension, asthma/COPD, HLD, CAD breast CA presented to the ER for notion of CP. Patient was jus recently admitted to the hospital for metabolic encephalopathy and discharge one day ago.  Per family since discharge she was not herself and had multiple complaints, with difficulty sleeping, decrease appetite, fatigue, generalized weakness. The night of the admission she wake up with left sided chest pain, type pressure, radiating to her back, aggravated by deep breathing, no relieving factors, associated with SOB. At home he BS was high around 250 per family. She was given 15 units of lantus. She was brought to ED. In the ED she was found to have hypoglycemia, as well as elevated BP and decreased oxygen saturation. When seen in the ER, she was moaning with severe CP, in mild to moderate distress, O2 sat aroung 96% on 3L oxygen. Labs in the ER with elevated troponin 0.034, , H/H 8.2/23.9, low calcium,elevated ALT 49, high PTH intact 541.6, low sodium 132. CXR There is bilateral pattern of interstitial and patchy alveolar infiltrates which has been progressing compare to previous admission. She has been admitted for HCAP and R/O ACS.

## 2024-03-04 NOTE — ASSESSMENT & PLAN NOTE
With chest pain  Mild changes on 12 lead EKG  R/O ACS  Last 2D echo 3/1/24 Left Ventricle: The left ventricle is normal in size. There is concentric remodeling. Septal motion is consistent with pacing. There is reduced systolic function. Biplane (2D) method of discs ejection fraction is 52%.   Will consult cardiology

## 2024-03-04 NOTE — CONSULTS
[facility] - Telemetry  Cardiology  Consult Note    Patient Name: Myesha Quinones  MRN: [id number]  Admission Date: 3/4/2024  Hospital Length of Stay: 0 days  Code Status: Full Code   Attending Provider: Danish Reyes,*   Consulting Provider: TRICIA Ray ANP  Primary Care Physician: Dayton Michael MD  Principal Problem:HCAP (healthcare-associated pneumonia)    Patient information was obtained from patient, past medical records, and ER records.     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 She reports chest pain described as a left sided stabbing pain that radiated to her back. She reports the pain was worse and not improving therefore she presented to the ER. She reports the pain can be worse with deep breathing and was reproducible upon palpation on exam. She was given SL NTG and IV Morphine in the ER with no improvement. She was recently discharged following admission for pneumonia and metabolic encephalopathy. Labs CBC with H&H 8.2&23.9 and BMP with creatinine 5.0  Tropnin 0.034 EKG V paced rhythm. Admitted to [facility] Medicine and Cardiology consulted for evaluation of chest pain and elevated troponin     Past Medical History:   Diagnosis Date    Acute encephalopathy 2/29/2024    Acute hypoxemic respiratory failure 12/19/2019    Acute right-sided thoracic back pain 12/09/2019    Allergy     Asthma     Basal cell carcinoma     left forehead    Basal cell carcinoma     left nose    Basal  cell carcinoma 05/20/2015    right nose    Basal cell carcinoma 12/22/2015    left lower post neck    Basal cell carcinoma 12/03/2019    left ant scalp     BCC (basal cell carcinoma of skin) 10/20/2022    Right alar groove    Bilateral pleural effusion     Bilateral renal cysts     Breast cancer     CAD (coronary artery disease)     Cardiomyopathy     Cardiomyopathy, ischemic     Cataract     CHF (congestive heart failure)     CKD (chronic kidney disease) stage 4, GFR 15-29 ml/min     Colon polyp 2011    Controlled type 2 diabetes mellitus with both eyes affected by mild nonproliferative retinopathy and macular edema, with long-term current use of insulin 02/22/2018    COPD (chronic obstructive pulmonary disease)     COPD exacerbation 04/08/2018    Current mild episode of major depressive disorder without prior episode 01/25/2022    Defibrillator discharge     Dependence on renal dialysis 08/22/2023    Diabetes mellitus     Diabetes mellitus type II     Diabetes with neurologic complications     Edema     ESRD needing dialysis     Goiter     MNG    Hematuria, unspecified     HX: breast cancer     Hyperlipidemia     Hypertension     Hypocalcemia     Hypokalemia     Hyponatremia     Hyponatremia 04/02/2022    Iron deficiency anemia 05/16/2017    Iron deficiency anemia     Iron deficiency anemia     Left kidney mass     Meningioma     Microalbuminuria due to type 2 diabetes mellitus 01/26/2022    Osteoporosis, postmenopausal     Pneumonia 12/08/2019    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    Proteinuria 01/21/2019    Pseudomonas pneumonia     SCC (squamous cell carcinoma) 08/25/2022    right posterior neck    Skin cancer     s/p excision    Sleep apnea     CPAP    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/2015    mid forehead    Unspecified vitamin D deficiency     UTI (urinary tract infection) 04/02/2022    Ventricular tachycardia     Vitamin B12 deficiency     Vitamin D deficiency disease        Past Surgical History:    Procedure Laterality Date    BASAL CELL CARCINOMA EXCISION      posterior neck and nose    BREAST BIOPSY      BREAST CYST EXCISION Left     BREAST SURGERY      CARDIAC DEFIBRILLATOR PLACEMENT      x 2    CATARACT EXTRACTION W/  INTRAOCULAR LENS IMPLANT Bilateral     CHOLECYSTECTOMY      COLONOSCOPY N/A 11/5/2019    Procedure: COLONOSCOPY;  Surgeon: Boaz Botello MD;  Location: [facility] ([address] [place] [address]);  Service: Endoscopy;  Laterality: N/A;  AICD - [employer]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    FRACTURE SURGERY      left elbow and wrist as a child    HYSTERECTOMY      INSERTION, CATHETER, DIALYSIS, PERITONEAL, LAPAROSCOPIC N/A 4/25/2023    Procedure: INSERTION, CATHETER, DIALYSIS, PERITONEAL, LAPAROSCOPIC;  Surgeon: Marcelo Isaac MD;  Location: [facility] OR;  Service: General;  Laterality: N/A;    LAPAROSCOPIC LYSIS OF ADHESIONS N/A 4/25/2023    Procedure: LYSIS, ADHESIONS, LAPAROSCOPIC;  Surgeon: Marcelo Isaac MD;  Location: [facility] OR;  Service: General;  Laterality: N/A;    MASTECTOMY Right     OMENTOPEXY, LAPAROSCOPIC N/A 4/25/2023    Procedure: OMENTOPEXY, LAPAROSCOPIC;  Surgeon: Marcelo Isaac MD;  Location: [facility] OR;  Service: General;  Laterality: N/A;    REPLACEMENT OF IMPLANTABLE CARDIOVERTER-DEFIBRILLATOR (ICD) GENERATOR N/A 12/17/2018    Procedure: REPLACEMENT, ICD GENERATOR;  Surgeon: Jan Mckeon MD;  Location: [facility] EP LAB;  Service: Cardiology;  Laterality: N/A;  DONNA, CRT-D gen change, MDT, MAC, SK, 3 Prep    REVISION OF SKIN POCKET FOR CARDIOVERTER-DEFIBRILLATOR  12/17/2018    Procedure: Revision, Skin Pocket, For Cardioverter-Defibrillator;  Surgeon: Jan Mckeon MD;  Location: [facility] EP LAB;  Service: Cardiology;;    SQUAMOUS CELL CARCINOMA EXCISION      remved from forehead    TONSILLECTOMY         Review of patient's allergies indicates:   Allergen Reactions    Iodine and iodide containing products Hives and Other (See Comments)     Not specified     Nifedipine       weakness       No current facility-administered medications on file prior to encounter.     Current Outpatient Medications on File Prior to Encounter   Medication Sig    acetaminophen (TYLENOL) 500 MG tablet Take 500 mg by mouth as needed.    albuterol (PROVENTIL/VENTOLIN HFA) 90 mcg/actuation inhaler Inhale 2 puffs into the lungs every 6 (six) hours as needed for Wheezing. Rescue    albuterol-ipratropium (DUO-NEB) 2.5 mg-0.5 mg/3 mL nebulizer solution Take 3 mLs by nebulization every 4 (four) hours as needed for Wheezing.    amoxicillin-clavulanate 250-125mg (AUGMENTIN) 250-125 mg Tab Take 1 tablet by mouth every 12 (twelve) hours. for 2 days    aspirin 81 MG Chew Take 81 mg by mouth once daily.    blood sugar diagnostic (ACCU-CHEK HECTOR) Strp Uses Accu-Check Hector meter to test BG 4x/day    calcitRIOL (ROCALTROL) 0.5 MCG Cap Take 1 capsule (0.5 mcg total) by mouth once daily.    carvediloL (COREG) 25 MG tablet TAKE 2 TABLETS (50 MG TOTAL) BY MOUTH 2 (TWO) TIMES DAILY.    cholecalciferol, vitamin D3, (VITAMIN D3) 50 mcg (2,000 unit) Tab Take 1 tablet by mouth once daily.     cyanocobalamin (VITAMIN B-12) 100 MCG tablet Take 100 mcg by mouth once daily.    dulaglutide (TRULICITY) 1.5 mg/0.5 mL pen injector Inject 1.5 mg into the skin every 7 days.    epoetin j[place]ica-epbx (RETACRIT) 10,000 unit/mL imjection Inject 1 mL (10,000 Units total) into the skin every 30 days.    ergocalciferol (ERGOCALCIFEROL) 50,000 unit Cap Take 50,000 Units by mouth every 7 days.    estradioL (ESTRACE) 0.01 % (0.1 mg/gram) vaginal cream PLACE 1 GRAM VAGINALLY EVERY OTHER DAY    estradioL (ESTRING) 2 mg (7.5 mcg /24 hour) vaginal ring Place 2 mg vaginally every 3 (three) months.    fluticasone propionate (FLONASE) 50 mcg/actuation nasal spray 1 spray by Each Nostril route daily as needed.    furosemide (LASIX) 40 MG tablet Take 1 tablet (40 mg total) by mouth once daily.    hydrALAZINE (APRESOLINE) 100 MG tablet Take 1 tablet (100 mg total)  "by mouth 2 (two) times daily.    insulin (LANTUS SOLOSTAR U-100 INSULIN) glargine 100 units/mL SubQ pen Inject 12 Units into the skin every evening.    lancets (ACCU-CHEK SOFTCLIX LANCETS) Misc Uses Accu-Chek Angelica meter to test BG 2x/day    montelukast (SINGULAIR) 10 mg tablet Take 1 tablet (10 mg total) by mouth once daily.    nitroGLYCERIN (NITROSTAT) 0.4 MG SL tablet Place 0.4 mg under the tongue every 5 (five) minutes as needed for Chest pain.     omega-3 fatty acids 500 mg Cap Take 1 capsule by mouth Daily.    OZEMPIC 0.25 mg or 0.5 mg (2 mg/3 mL) pen injector Inject 0.25 mg into the skin every Wednesday.    pen needle, diabetic (BD ULTRA-FINE MINI PEN NEEDLE) 31 gauge x 3/16" Ndle Use with insulin twice a day.    pravastatin (PRAVACHOL) 40 MG tablet Take 1 tablet (40 mg total) by mouth once daily.    sevelamer carbonate (RENVELA) 800 mg Tab Take 1 tablet (800 mg total) by mouth 3 (three) times daily.    sodium bicarbonate 650 MG tablet Take 1 tablet (650 mg total) by mouth 3 (three) times daily.    valsartan (DIOVAN) 160 MG tablet Take 160 mg by mouth once daily.     Family History       Problem Relation (Age of Onset)    Asthma Mother    Cancer Brother, Daughter, Son    Cardiomyopathy Father    Cerebral aneurysm Brother    Diabetes Father, Sister, Brother, Brother, Brother, Brother, Daughter, Son, Son    Heart attack Sister, Brother    Heart disease Sister, Brother, Brother, Brother    Hypertension Mother, Brother, Brother    Melanoma Daughter    No Known Problems Maternal Grandmother, Maternal Grandfather, Paternal Grandmother, Paternal Grandfather    Obesity Daughter    Stroke Mother          Tobacco Use    Smoking status: Never     Passive exposure: Never    Smokeless tobacco: Never   Substance and Sexual Activity    Alcohol use: No     Alcohol/week: 0.0 standard drinks of alcohol    Drug use: No    Sexual activity: Yes     Partners: Male     Review of Systems   Constitutional: Positive for " malaise/fatigue. Negative for chills, decreased appetite, diaphoresis, fever, weight gain and weight loss.   Cardiovascular:  Positive for chest pain. Negative for claudication, dyspnea on exertion, irregular heartbeat, leg swelling, near-syncope, orthopnea, palpitations and paroxysmal nocturnal dyspnea.   Respiratory:  Negative for cough, shortness of breath, snoring, sputum production and wheezing.    Endocrine: Negative for cold intolerance, heat intolerance, polydipsia, polyphagia and polyuria.   Skin:  Negative for color change, dry skin, itching, nail changes and poor wound healing.   Musculoskeletal:  Negative for back pain, gout, joint pain and joint swelling.   Gastrointestinal:  Negative for bloating, abdominal pain, constipation, diarrhea, hematemesis, hematochezia, melena, nausea and vomiting.   Genitourinary:  Negative for dysuria, hematuria and nocturia.   Neurological:  Positive for weakness. Negative for dizziness, headaches, light-headedness, numbness and paresthesias.   Psychiatric/Behavioral:  Negative for altered mental status, depression and memory loss.      Objective:     Vital Signs (Most Recent):  Temp: 98 °F (36.7 °C) (03/04/24 1004)  Pulse: 71 (03/04/24 1004)  Resp: 20 (03/04/24 1004)  BP: (!) 145/67 (03/04/24 1004)  SpO2: 97 % (03/04/24 1004) Vital Signs (24h Range):  Temp:  [97.5 °F (36.4 °C)-99.7 °F (37.6 °C)] 98 °F (36.7 °C)  Pulse:  [66-71] 71  Resp:  [16-35] 20  SpO2:  [93 %-98 %] 97 %  BP: (101-169)/(58-70) 145/67     Weight: 65.8 kg (145 lb)  Body mass index is 25.69 kg/m².    SpO2: 97 %       No intake or output data in the 24 hours ending 03/04/24 1012    Lines/Drains/Airways       Peripheral Intravenous Line  Duration                  Peripheral IV - Single Lumen 03/04/24 0440 20 G Posterior;Right Hand <1 day                     Physical Exam  Constitutional:       General: She is not in acute distress.     Appearance: She is well-developed.   Cardiovascular:      Rate and  Rhythm: Normal rate and regular rhythm.      Heart sounds: No murmur heard.     No gallop.   Pulmonary:      Effort: Pulmonary effort is normal. No respiratory distress.      Breath sounds: Normal breath sounds. No wheezing.   Abdominal:      General: Bowel sounds are normal. There is no distension.      Palpations: Abdomen is soft.      Tenderness: There is no abdominal tenderness.   Skin:     General: Skin is warm and dry.   Neurological:      Mental Status: She is alert and oriented to person, place, and time.      Significant Labs: BMP:   Recent Labs   Lab 03/04/24  0430 03/04/24  0550   GLU 42*  --    *  --    K 3.8  --    CL 99  --    CO2 14*  --    BUN 98*  --    CREATININE 5.0*  --    CALCIUM 6.2*  --    MG  --  1.8   , CBC   Recent Labs   Lab 03/04/24 0430   WBC 12.07   HGB 8.2*   HCT 23.9*      , and Troponin   Recent Labs   Lab 03/04/24 0430   TROPONINI 0.034*       Significant Imaging: Echocardiogram: Transthoracic echo (TTE) complete (Cupid Only):   Results for orders placed or performed during the hospital encounter of 02/29/24   Echo   Result Value Ref Range    RA Width 4.10 cm    LA volume (mod) 63.40 cm3    Left Atrium Major Axis 5.13 cm    Left Atrium Minor Axis 5.32 cm    RA Major Axis 4.13 cm    LV Diastolic Volume 79.92 mL    LV Systolic Volume 29.10 mL    PV Peak D Norbert 0.23 m/s    PV Peak S Norbert 0.70 m/s    MV Peak A Norbert 0.90 m/s    MV stenosis pressure 1/2 time 52.72 ms    MV VTI 28.4 cm    TR Max Norbert 3.89 m/s    MV Peak E Norbert 1.23 m/s    MV peak gradient 7 mmHg    Mr max norbert 5.48 m/s    Ao VTI 35.80 cm    Ao peak norbert 1.45 m/s    LVOT peak VTI 20.40 cm    LVOT peak norbert 0.80 m/s    LVOT diameter 1.86 cm    E wave deceleration time 181.80 msec    MV mean gradient 4 mmHg    AV mean gradient 5 mmHg    RV S' 15.15 cm/s    TAPSE 1.68 cm    RVDD 2.99 cm    LA size 3.87 cm    Ascending aorta 2.27 cm    STJ 2.21 cm    Sinus 2.31 cm    LVIDs 2.78 2.1 - 4.0 cm    Posterior Wall 1.03 0.6 -  1.1 cm    IVS 0.85 0.6 - 1.1 cm    LVIDd 4.23 3.5 - 6.0 cm    PV PEAK VELOCITY 0.88 m/s    TDI LATERAL 0.08 m/s    Pulmonary Valve Mean Velocity 0.71 m/s    Left Ventricular Outflow Tract Mean Gradient 1.57 mmHg    Left Ventricular Outflow Tract Mean Velocity 0.60 cm/s    Wilson's Biplane MOD Ejection Fraction 52 %    IVC diameter 2.07 cm    TDI SEPTAL 0.07 m/s    LV LATERAL E/E' RATIO 15.38 m/s    LV SEPTAL E/E' RATIO 17.57 m/s    FS 34 28 - 44 %    LV mass 127.41 g    ZLVIDD -0.99     ZLVIDS -0.31     Left Ventricle Relative Wall Thickness 0.49 cm    AV valve area 1.55 cm²    AV Velocity Ratio 0.55     AV index (prosthetic) 0.57     MV valve area p 1/2 method 4.17 cm2    MV valve area by continuity eq 1.95 cm2    PV peak gradient 3 mmHg    E/A ratio 1.37     Mean e' 0.08 m/s    Pulm vein S/D ratio 3.04     LVOT area 2.7 cm2    LVOT stroke volume 55.40 cm3    AV peak gradient 8 mmHg    E/E' ratio 16.40 m/s    LV Systolic Volume Index 17.4 mL/m2    LV Diastolic Volume Index 47.86 mL/m2    LV Mass Index 76 g/m2    Triscuspid Valve Regurgitation Peak Gradient 61 mmHg    LA Volume Index (Mod) 38.0 mL/m2    JOSAFAT by Velocity Ratio 1.50 cm²    BSA 1.69 m2    LA Volume Index 45.3 mL/m2    LA volume 75.60 cm3    LA WIDTH 4.4 cm    TV resting pulmonary artery pressure 69 mmHg    RV TB RVSP 12 mmHg    Est. RA pres 8 mmHg    Narrative      Left Ventricle: The left ventricle is normal in size. There is   concentric remodeling. Septal motion is consistent with pacing. There is   reduced systolic function. Biplane (2D) method of discs ejection fraction   is 52%.    Right Ventricle: Normal right ventricular cavity size. Systolic   function is borderline low. TAPSE is 1.68 cm. Pacemaker lead present in   the ventricle.    Left Atrium: Left atrium is moderately dilated.    Right Atrium: Lead present in the right atrium.    Aortic Valve: There is mild aortic valve sclerosis. Mildly restricted   motion. There is mild stenosis. Aortic  valve area by VTI is 1.55 cm².   Aortic valve peak velocity is 1.45 m/s. Mean gradient is 5 mmHg. The   dimensionless index is 0.57.    Mitral Valve: There is moderate regurgitation.    Tricuspid Valve: There is moderate regurgitation.    Pulmonary Artery: The estimated pulmonary artery systolic pressure is   69 mmHg.    IVC/SVC: Intermediate venous pressure at 8 mmHg.    Pericardium: Left pleural effusion.       Assessment and Plan:     Troponin level elevated  - initial troponin 0.034; EKG with V paced rhythm  - pain atypical on presentation- worsens with deep breathing and reproducible upon palpation   - recent echo with normal LVEF  - feel troponin elevation related to demand etiology and no concern for ACS  - continue GDMT with ASA, statin, BB and ARB     Essential hypertension  - initial /70; trended down to 100s overnight  - on Coreg, Diovan Losartan and Hydralazine at home doses  - goal BP less than 130/80  - BP well controlled; continue medications and monitor BP         Acute on chronic combined systolic and diastolic heart failure  - presented with chest pain atypical in nature  - echo 3/1/2024 with EF 52% and mild AS; history of NICM since 2004 with AICD in place  -  CXR reviewed; concerned about mild volume overload  - on Lasix 40mg po daily as well as PD at home; agree with IV Lasix 20mg BID with no output documented so far; anticipate PD   - on Coreg, Losartan and Hydralazine; initial /70 with trend down to 100s  - monitor strict I&Os and daily weights     Coronary artery disease involving native coronary artery without angina pectoris - Non-obstructive 50% LAD  - non obstructive per chart review  - mild troponin rise more consistent with demand etiology; no concern for ACS   - continue ASA, BB, statin and ARB  - echo with normal LVEF; no plans for further cardiac workup         VTE Risk Mitigation (From admission, onward)           Ordered     heparin (porcine) injection 5,000  Units  Every 8 hours         03/04/24 0601     IP VTE HIGH RISK PATIENT  Once         03/04/24 0601     Place sequential compression device  Until discontinued         03/04/24 0601                    Thank you for your consult. I will follow-up with patient. Please contact us if you have any additional questions.    TRICIA Ray, ANP  Cardiology   [facility]

## 2024-03-04 NOTE — ASSESSMENT & PLAN NOTE
Likely hepato-renal syndrome  Monitor LFTs  With previous metabolic encephalopathy  Monitor for AMS  Consider ammonia level if worsening

## 2024-03-04 NOTE — ED NOTES
"Pt c/o mid sternal/lt sided 9/10 pressure CP radiating to back "several hours ago". Pt endorsees "a little bit" of SOB. Pt denies N/V. Pt GCS15, AAOx4  "

## 2024-03-04 NOTE — ASSESSMENT & PLAN NOTE
Recently discharged from the hospital and readmitted with abnormal CXR  Will give zosyn  F/U blood Cx  Respiratory care  Consider repeat CXR or CT chest if no improvement

## 2024-03-05 LAB
ANION GAP SERPL CALC-SCNC: 17 MMOL/L (ref 8–16)
BACTERIA BLD CULT: NORMAL
BACTERIA BLD CULT: NORMAL
BASOPHILS # BLD AUTO: 0.02 K/UL (ref 0–0.2)
BASOPHILS NFR BLD: 0.2 % (ref 0–1.9)
BUN SERPL-MCNC: 86 MG/DL (ref 8–23)
CALCIUM SERPL-MCNC: 6.6 MG/DL (ref 8.7–10.5)
CHLORIDE SERPL-SCNC: 97 MMOL/L (ref 95–110)
CO2 SERPL-SCNC: 17 MMOL/L (ref 23–29)
CREAT SERPL-MCNC: 5.3 MG/DL (ref 0.5–1.4)
DIFFERENTIAL METHOD BLD: ABNORMAL
EOSINOPHIL # BLD AUTO: 0.1 K/UL (ref 0–0.5)
EOSINOPHIL NFR BLD: 0.7 % (ref 0–8)
ERYTHROCYTE [DISTWIDTH] IN BLOOD BY AUTOMATED COUNT: 13.5 % (ref 11.5–14.5)
EST. GFR  (NO RACE VARIABLE): 8 ML/MIN/1.73 M^2
GLUCOSE SERPL-MCNC: 167 MG/DL (ref 70–110)
HCT VFR BLD AUTO: 26.3 % (ref 37–48.5)
HGB BLD-MCNC: 8.5 G/DL (ref 12–16)
IMM GRANULOCYTES # BLD AUTO: 0.1 K/UL (ref 0–0.04)
IMM GRANULOCYTES NFR BLD AUTO: 0.9 % (ref 0–0.5)
LYMPHOCYTES # BLD AUTO: 0.8 K/UL (ref 1–4.8)
LYMPHOCYTES NFR BLD: 7.3 % (ref 18–48)
MAGNESIUM SERPL-MCNC: 1.9 MG/DL (ref 1.6–2.6)
MCH RBC QN AUTO: 30.4 PG (ref 27–31)
MCHC RBC AUTO-ENTMCNC: 32.3 G/DL (ref 32–36)
MCV RBC AUTO: 94 FL (ref 82–98)
MONOCYTES # BLD AUTO: 0.6 K/UL (ref 0.3–1)
MONOCYTES NFR BLD: 5.8 % (ref 4–15)
NEUTROPHILS # BLD AUTO: 9 K/UL (ref 1.8–7.7)
NEUTROPHILS NFR BLD: 85.1 % (ref 38–73)
NRBC BLD-RTO: 0 /100 WBC
OHS QRS DURATION: 130 MS
OHS QTC CALCULATION: 542 MS
PHOSPHATE SERPL-MCNC: 8.4 MG/DL (ref 2.7–4.5)
PLATELET # BLD AUTO: 231 K/UL (ref 150–450)
PMV BLD AUTO: 10.4 FL (ref 9.2–12.9)
POCT GLUCOSE: 150 MG/DL (ref 70–110)
POCT GLUCOSE: 195 MG/DL (ref 70–110)
POCT GLUCOSE: 273 MG/DL (ref 70–110)
POCT GLUCOSE: 277 MG/DL (ref 70–110)
POCT GLUCOSE: 309 MG/DL (ref 70–110)
POTASSIUM SERPL-SCNC: 3.8 MMOL/L (ref 3.5–5.1)
RBC # BLD AUTO: 2.8 M/UL (ref 4–5.4)
SODIUM SERPL-SCNC: 131 MMOL/L (ref 136–145)
WBC # BLD AUTO: 10.57 K/UL (ref 3.9–12.7)

## 2024-03-05 PROCEDURE — 63600175 PHARM REV CODE 636 W HCPCS: Performed by: STUDENT IN AN ORGANIZED HEALTH CARE EDUCATION/TRAINING PROGRAM

## 2024-03-05 PROCEDURE — 87077 CULTURE AEROBIC IDENTIFY: CPT | Performed by: HOSPITALIST

## 2024-03-05 PROCEDURE — 94640 AIRWAY INHALATION TREATMENT: CPT

## 2024-03-05 PROCEDURE — 63700000 PHARM REV CODE 250 ALT 637 W/O HCPCS: Performed by: HOSPITALIST

## 2024-03-05 PROCEDURE — 25000003 PHARM REV CODE 250: Performed by: INTERNAL MEDICINE

## 2024-03-05 PROCEDURE — 36415 COLL VENOUS BLD VENIPUNCTURE: CPT | Performed by: FAMILY MEDICINE

## 2024-03-05 PROCEDURE — 85025 COMPLETE CBC W/AUTO DIFF WBC: CPT | Performed by: FAMILY MEDICINE

## 2024-03-05 PROCEDURE — 63600175 PHARM REV CODE 636 W HCPCS: Performed by: HOSPITALIST

## 2024-03-05 PROCEDURE — 99900035 HC TECH TIME PER 15 MIN (STAT)

## 2024-03-05 PROCEDURE — 84100 ASSAY OF PHOSPHORUS: CPT | Performed by: FAMILY MEDICINE

## 2024-03-05 PROCEDURE — 63600175 PHARM REV CODE 636 W HCPCS: Performed by: FAMILY MEDICINE

## 2024-03-05 PROCEDURE — 94799 UNLISTED PULMONARY SVC/PX: CPT | Mod: XB

## 2024-03-05 PROCEDURE — 80048 BASIC METABOLIC PNL TOTAL CA: CPT | Performed by: FAMILY MEDICINE

## 2024-03-05 PROCEDURE — 63600175 PHARM REV CODE 636 W HCPCS: Performed by: INTERNAL MEDICINE

## 2024-03-05 PROCEDURE — 83735 ASSAY OF MAGNESIUM: CPT | Performed by: FAMILY MEDICINE

## 2024-03-05 PROCEDURE — 87186 SC STD MICRODIL/AGAR DIL: CPT | Performed by: HOSPITALIST

## 2024-03-05 PROCEDURE — 11000001 HC ACUTE MED/SURG PRIVATE ROOM

## 2024-03-05 PROCEDURE — 97530 THERAPEUTIC ACTIVITIES: CPT | Mod: CO

## 2024-03-05 PROCEDURE — 87205 SMEAR GRAM STAIN: CPT | Performed by: HOSPITALIST

## 2024-03-05 PROCEDURE — 97535 SELF CARE MNGMENT TRAINING: CPT | Mod: CO

## 2024-03-05 PROCEDURE — 63600175 PHARM REV CODE 636 W HCPCS: Performed by: NURSE PRACTITIONER

## 2024-03-05 PROCEDURE — 27000221 HC OXYGEN, UP TO 24 HOURS

## 2024-03-05 PROCEDURE — 25000003 PHARM REV CODE 250: Performed by: STUDENT IN AN ORGANIZED HEALTH CARE EDUCATION/TRAINING PROGRAM

## 2024-03-05 PROCEDURE — 25000003 PHARM REV CODE 250: Performed by: FAMILY MEDICINE

## 2024-03-05 PROCEDURE — 25000003 PHARM REV CODE 250: Performed by: REGISTERED NURSE

## 2024-03-05 PROCEDURE — 25000003 PHARM REV CODE 250: Performed by: HOSPITALIST

## 2024-03-05 PROCEDURE — 96372 THER/PROPH/DIAG INJ SC/IM: CPT | Performed by: FAMILY MEDICINE

## 2024-03-05 PROCEDURE — 87070 CULTURE OTHR SPECIMN AEROBIC: CPT | Performed by: HOSPITALIST

## 2024-03-05 PROCEDURE — 25000242 PHARM REV CODE 250 ALT 637 W/ HCPCS: Performed by: STUDENT IN AN ORGANIZED HEALTH CARE EDUCATION/TRAINING PROGRAM

## 2024-03-05 RX ORDER — CARVEDILOL 3.12 MG/1
3.12 TABLET ORAL 2 TIMES DAILY
Status: DISCONTINUED | OUTPATIENT
Start: 2024-03-05 | End: 2024-03-11 | Stop reason: HOSPADM

## 2024-03-05 RX ORDER — GUAIFENESIN 600 MG/1
600 TABLET, EXTENDED RELEASE ORAL 2 TIMES DAILY
Status: DISCONTINUED | OUTPATIENT
Start: 2024-03-05 | End: 2024-03-11 | Stop reason: HOSPADM

## 2024-03-05 RX ORDER — ONDANSETRON HYDROCHLORIDE 2 MG/ML
4 INJECTION, SOLUTION INTRAVENOUS EVERY 6 HOURS PRN
Status: DISCONTINUED | OUTPATIENT
Start: 2024-03-05 | End: 2024-03-11 | Stop reason: HOSPADM

## 2024-03-05 RX ORDER — HYDROXYZINE HYDROCHLORIDE 25 MG/1
25 TABLET, FILM COATED ORAL 3 TIMES DAILY PRN
Status: DISCONTINUED | OUTPATIENT
Start: 2024-03-05 | End: 2024-03-06

## 2024-03-05 RX ORDER — CALCIUM CARBONATE 200(500)MG
1000 TABLET,CHEWABLE ORAL ONCE
Status: COMPLETED | OUTPATIENT
Start: 2024-03-05 | End: 2024-03-05

## 2024-03-05 RX ORDER — POLYETHYLENE GLYCOL 3350 17 G/17G
17 POWDER, FOR SOLUTION ORAL DAILY
Status: DISCONTINUED | OUTPATIENT
Start: 2024-03-05 | End: 2024-03-07

## 2024-03-05 RX ORDER — IBUPROFEN 200 MG
200 TABLET ORAL
Status: COMPLETED | OUTPATIENT
Start: 2024-03-05 | End: 2024-03-05

## 2024-03-05 RX ORDER — LIDOCAINE 50 MG/G
1 PATCH TOPICAL
Status: DISCONTINUED | OUTPATIENT
Start: 2024-03-05 | End: 2024-03-11 | Stop reason: HOSPADM

## 2024-03-05 RX ORDER — IPRATROPIUM BROMIDE AND ALBUTEROL SULFATE 2.5; .5 MG/3ML; MG/3ML
3 SOLUTION RESPIRATORY (INHALATION) EVERY 6 HOURS
Status: COMPLETED | OUTPATIENT
Start: 2024-03-05 | End: 2024-03-06

## 2024-03-05 RX ORDER — ERGOCALCIFEROL 1.25 MG/1
50000 CAPSULE ORAL
Status: DISCONTINUED | OUTPATIENT
Start: 2024-03-05 | End: 2024-03-11 | Stop reason: HOSPADM

## 2024-03-05 RX ORDER — HYDRALAZINE HYDROCHLORIDE 25 MG/1
25 TABLET, FILM COATED ORAL 2 TIMES DAILY
Status: DISCONTINUED | OUTPATIENT
Start: 2024-03-05 | End: 2024-03-06

## 2024-03-05 RX ORDER — VALSARTAN 40 MG/1
40 TABLET ORAL DAILY
Status: DISCONTINUED | OUTPATIENT
Start: 2024-03-06 | End: 2024-03-06

## 2024-03-05 RX ORDER — SEVELAMER CARBONATE 800 MG/1
800 TABLET, FILM COATED ORAL
Status: DISCONTINUED | OUTPATIENT
Start: 2024-03-05 | End: 2024-03-06

## 2024-03-05 RX ADMIN — PRAVASTATIN SODIUM 40 MG: 40 TABLET ORAL at 08:03

## 2024-03-05 RX ADMIN — IPRATROPIUM BROMIDE AND ALBUTEROL SULFATE 3 ML: 2.5; .5 SOLUTION RESPIRATORY (INHALATION) at 01:03

## 2024-03-05 RX ADMIN — AZITHROMYCIN DIHYDRATE 500 MG: 250 TABLET ORAL at 08:03

## 2024-03-05 RX ADMIN — ASPIRIN 81 MG CHEWABLE TABLET 81 MG: 81 TABLET CHEWABLE at 08:03

## 2024-03-05 RX ADMIN — LIDOCAINE 5% 1 PATCH: 700 PATCH TOPICAL at 09:03

## 2024-03-05 RX ADMIN — INSULIN ASPART 2 UNITS: 100 INJECTION, SOLUTION INTRAVENOUS; SUBCUTANEOUS at 09:03

## 2024-03-05 RX ADMIN — Medication 100 MCG: at 08:03

## 2024-03-05 RX ADMIN — INSULIN ASPART 3 UNITS: 100 INJECTION, SOLUTION INTRAVENOUS; SUBCUTANEOUS at 04:03

## 2024-03-05 RX ADMIN — IBUPROFEN 200 MG: 200 TABLET, FILM COATED ORAL at 12:03

## 2024-03-05 RX ADMIN — CHOLECALCIFEROL TAB 25 MCG (1000 UNIT) 2000 UNITS: 25 TAB at 08:03

## 2024-03-05 RX ADMIN — SODIUM BICARBONATE 650 MG: 650 TABLET ORAL at 08:03

## 2024-03-05 RX ADMIN — FUROSEMIDE 60 MG: 10 INJECTION, SOLUTION INTRAMUSCULAR; INTRAVENOUS at 10:03

## 2024-03-05 RX ADMIN — SEVELAMER CARBONATE 800 MG: 800 TABLET, FILM COATED ORAL at 04:03

## 2024-03-05 RX ADMIN — SODIUM BICARBONATE 650 MG: 650 TABLET ORAL at 12:03

## 2024-03-05 RX ADMIN — ONDANSETRON 4 MG: 2 INJECTION INTRAMUSCULAR; INTRAVENOUS at 04:03

## 2024-03-05 RX ADMIN — HEPARIN SODIUM 5000 UNITS: 5000 INJECTION INTRAVENOUS; SUBCUTANEOUS at 01:03

## 2024-03-05 RX ADMIN — CARVEDILOL 3.12 MG: 3.12 TABLET, FILM COATED ORAL at 09:03

## 2024-03-05 RX ADMIN — INSULIN DETEMIR 6 UNITS: 100 INJECTION, SOLUTION SUBCUTANEOUS at 09:03

## 2024-03-05 RX ADMIN — ERGOCALCIFEROL 50000 UNITS: 1.25 CAPSULE ORAL at 01:03

## 2024-03-05 RX ADMIN — HYDRALAZINE HYDROCHLORIDE 25 MG: 25 TABLET, FILM COATED ORAL at 09:03

## 2024-03-05 RX ADMIN — SODIUM BICARBONATE 650 MG: 650 TABLET ORAL at 09:03

## 2024-03-05 RX ADMIN — GUAIFENESIN 600 MG: 600 TABLET, EXTENDED RELEASE ORAL at 09:03

## 2024-03-05 RX ADMIN — PIPERACILLIN AND TAZOBACTAM 4.5 G: 4; .5 INJECTION, POWDER, LYOPHILIZED, FOR SOLUTION INTRAVENOUS; PARENTERAL at 09:03

## 2024-03-05 RX ADMIN — HEPARIN SODIUM 5000 UNITS: 5000 INJECTION INTRAVENOUS; SUBCUTANEOUS at 06:03

## 2024-03-05 RX ADMIN — HYDROXYZINE HYDROCHLORIDE 25 MG: 25 TABLET, FILM COATED ORAL at 09:03

## 2024-03-05 RX ADMIN — SODIUM BICARBONATE 650 MG: 650 TABLET ORAL at 04:03

## 2024-03-05 RX ADMIN — PIPERACILLIN AND TAZOBACTAM 4.5 G: 4; .5 INJECTION, POWDER, LYOPHILIZED, FOR SOLUTION INTRAVENOUS; PARENTERAL at 08:03

## 2024-03-05 RX ADMIN — SEVELAMER CARBONATE 800 MG: 800 TABLET, FILM COATED ORAL at 12:03

## 2024-03-05 RX ADMIN — HEPARIN SODIUM 5000 UNITS: 5000 INJECTION INTRAVENOUS; SUBCUTANEOUS at 10:03

## 2024-03-05 RX ADMIN — ACETAMINOPHEN 650 MG: 325 TABLET ORAL at 08:03

## 2024-03-05 RX ADMIN — HYDROCODONE BITARTRATE AND ACETAMINOPHEN 1 TABLET: 5; 325 TABLET ORAL at 03:03

## 2024-03-05 RX ADMIN — CALCIUM CARBONATE (ANTACID) CHEW TAB 500 MG 1000 MG: 500 CHEW TAB at 08:03

## 2024-03-05 RX ADMIN — IPRATROPIUM BROMIDE AND ALBUTEROL SULFATE 3 ML: 2.5; .5 SOLUTION RESPIRATORY (INHALATION) at 07:03

## 2024-03-05 NOTE — PT/OT/SLP PROGRESS
"Occupational Therapy   Treatment    Name: Myesha Quinones  MRN: [id number]  Admitting Diagnosis:  Community acquired pneumonia       Recommendations:     Discharge Recommendations:  (TBD pending progress with therapy)  Discharge Equipment Recommendations:  none  Barriers to discharge:  Other (Comment) (Increased level of assistance)    Assessment:     Myesha Quinones is a 83 y.o. female with a medical diagnosis of Community acquired pneumonia.  Performance deficits affecting function are weakness, impaired endurance, impaired self care skills, impaired functional mobility, gait instability, impaired balance, decreased lower extremity function, impaired cardiopulmonary response to activity.    Pt found in bed, agreeable to therapy.  Pt is progressing towards goals. Continue OT services to address functional goals, progressing as able.      Rehab Prognosis:  Good; patient would benefit from acute skilled OT services to address these deficits and reach maximum level of function.       Plan:     Patient to be seen 5 x/week to address the above listed problems via self-care/home management, therapeutic activities, therapeutic exercises  Plan of Care Expires: 04/04/24  Plan of Care Reviewed with: patient, daughter, other (see comments)    Subjective     Chief Complaint: "I can try."  Patient/Family Comments/goals: to return to PLOF  Pain/Comfort:  Pain Rating 1: 0/10  Pain Rating Post-Intervention 1: 0/10    Objective:     Communicated with: RN prior to session.  Patient found HOB elevated with bed alarm, peripheral IV, oxygen upon OT entry to room.    General Precautions: Standard, fall, hearing impaired    Orthopedic Precautions:N/A  Braces: N/A  Respiratory Status: Nasal cannula, flow 2 L/min RA 85-86% while sitting EOB.      Occupational Performance:     Bed Mobility:    Patient completed Rolling/Turning to Left with  stand by assistance w/ BR  Patient completed Scooting/Bridging with stand by assistance to " scoot seated to EOB  Patient completed Supine to Sit with stand by assistance, HOB elevated, increased effort, vc's for effective technique  Patient completed Sit to Supine with stand by assistance     Functional Mobility/Transfers:  Patient completed Sit <> Stand Transfer with contact guard assistance  with  rolling walker   Patient completed Toilet Transfer Stand Pivot technique with contact guard assistance with  rolling walker  Functional Mobility: Pt ambulated room distances with CGA using RW.  Pt requires min vcs fro RW safety/mgmt    Activities of Daily Living:  Grooming: stand by assistance seated EOB  Toileting: contact guard assistance for clothing mgmt       [facility] 6 Click ADL: 18    Treatment & Education:  Pt sat EOB with SBA while performing g/h task.  Pt began to vomit when brushing teeth. Brown/green bile and phlegm noted.  Nurse notified.    Once vomiting stopped pt returned to bed with HOB elevated.  Pt reports starting to feel better.      Patient left HOB elevated with all lines intact, call button in reach, bed alarm on, nurse notified, and spouse  present    GOALS:   Multidisciplinary Problems       Occupational Therapy Goals          Problem: Occupational Therapy    Goal Priority Disciplines Outcome Interventions   Occupational Therapy Goal     OT, PT/OT Ongoing, Progressing    Description: Goals to be met by: 4/4/23     Patient will increase functional independence with ADLs by performing:    LE Dressing with Modified Elizabeth.  Grooming while standing with Modified Elizabeth.  Toileting from toilet with Modified Elizabeth for hygiene and clothing management.   Supine to sit with Modified Elizabeth.  Step transfer with Modified Elizabeth  Toilet transfer to toilet with Modified Elizabeth.  Increased functional strength to WFL for self care skills and functional mobility.  Upper extremity exercise program x10 reps per handout, with independence.                         Time  Tracking:     OT Date of Treatment: 03/05/24  OT Start Time: 1430  OT Stop Time: 1506  OT Total Time (min): 36 min    Billable Minutes:Self Care/Home Management 23  Therapeutic Activity 13            3/5/2024

## 2024-03-05 NOTE — PT/OT/SLP PROGRESS
Occupational Therapy  Visit Attempt     Patient Name:  Myesha Quinones   MRN:  [id number]    Patient not seen today secondary to  (pt asleep; family requesting to allow pt to rest this AM- reports restless night and vomiting this AM). Will follow-up as available.    3/5/2024

## 2024-03-05 NOTE — ASSESSMENT & PLAN NOTE
Creatine stable for now. BMP reviewed- noted Estimated Creatinine Clearance: 7.2 mL/min (A) (based on SCr of 5.3 mg/dL (H)). according to latest data. 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  Monitor UOP and serial BMP and adjust therapy as needed. Renally dose meds. Avoid nephrotoxic medications and procedures.

## 2024-03-05 NOTE — PLAN OF CARE
Problem: Adult Inpatient Plan of Care  Goal: Plan of Care Review  Outcome: Ongoing, Progressing     Problem: Adult Inpatient Plan of Care  Goal: Patient-Specific Goal (Individualized)  Outcome: Ongoing, Progressing     Problem: Adult Inpatient Plan of Care  Goal: Optimal Comfort and Wellbeing  Outcome: Ongoing, Progressing     Problem: Diabetes Comorbidity  Goal: Blood Glucose Level Within Targeted Range  Outcome: Ongoing, Progressing     Problem: Fluid Imbalance (Pneumonia)  Goal: Fluid Balance  Outcome: Ongoing, Progressing     Problem: Infection (Pneumonia)  Goal: Resolution of Infection Signs and Symptoms  Outcome: Ongoing, Progressing

## 2024-03-05 NOTE — SUBJECTIVE & OBJECTIVE
Interval History: c/o productive cough and feeling tired. On 2 L oxygen NC.     Review of Systems  Objective:     Vital Signs (Most Recent):  Temp: 96.3 °F (35.7 °C) (03/05/24 1124)  Pulse: 76 (03/05/24 1346)  Resp: 18 (03/05/24 1346)  BP: (!) 116/54 (03/05/24 1124)  SpO2: 98 % (03/05/24 1346) Vital Signs (24h Range):  Temp:  [96.3 °F (35.7 °C)-98.5 °F (36.9 °C)] 96.3 °F (35.7 °C)  Pulse:  [60-85] 76  Resp:  [17-18] 18  SpO2:  [92 %-98 %] 98 %  BP: (104-150)/(45-66) 116/54     Weight: 65.8 kg (145 lb 1 oz)  Body mass index is 25.7 kg/m².    Intake/Output Summary (Last 24 hours) at 3/5/2024 1535  Last data filed at 3/5/2024 0730  Gross per 24 hour   Intake --   Output 970 ml   Net -970 ml         Physical Exam  Vitals and nursing note reviewed.   Constitutional:       General: She is not in acute distress.     Comments: Elderly tired appearing female   Cardiovascular:      Rate and Rhythm: Normal rate and regular rhythm.      Heart sounds: Normal heart sounds. No murmur heard.  Pulmonary:      Effort: Pulmonary effort is normal. No respiratory distress.      Breath sounds: No wheezing.   Abdominal:      Palpations: Abdomen is soft.      Tenderness: There is no abdominal tenderness.      Comments: PD catheter in place   Musculoskeletal:      Right lower leg: No edema.      Left lower leg: No edema.   Skin:     General: Skin is warm and dry.      Findings: No bruising.   Neurological:      General: No focal deficit present.      Mental Status: She is alert and oriented to person, place, and time.   Psychiatric:         Mood and Affect: Mood normal.             Significant Labs: All pertinent labs within the past 24 hours have been reviewed.    Significant Imaging: I have reviewed all pertinent imaging results/findings within the past 24 hours.

## 2024-03-05 NOTE — PT/OT/SLP PROGRESS
Physical Therapy      Patient Name:  Myesha Quinones   MRN:  [id number]    Patient not seen today secondary to Therapist assessment.  Patient in and out of sleep; family requesting to allow patient to rest this - reports restless night and vomiting this AM  Will follow-up as able.

## 2024-03-05 NOTE — ASSESSMENT & PLAN NOTE
Patient with Hypoxic Respiratory failure which is Acute.  she is not on home oxygen. Supplemental oxygen was provided and noted-      .   Signs/symptoms of respiratory failure include- tachypnea, increased work of breathing, and lethargy. Contributing diagnoses includes - Pneumonia Labs and images were reviewed. Patient Has not had a recent ABG. Will treat underlying causes and adjust management of respiratory failure as follows- IV antibiotics     On 2 L oxygen NC. Wean as tolerated.

## 2024-03-05 NOTE — ASSESSMENT & PLAN NOTE
Patient has hyponatremia which is controlled,We will aim to correct the sodium by 4-6mEq in 24 hours. We will monitor sodium Daily. The hyponatremia is due to Dehydration/hypovolemia, Heart Failure, and renal insufficiency. We will obtain the following studies: TSH, T4. We will treat the hyponatremia with Fluid restriction of:  1.5 liter per day. The patient's sodium results have been reviewed and are listed below.  Recent Labs   Lab 03/05/24  0317   *

## 2024-03-05 NOTE — PLAN OF CARE
Problem: Occupational Therapy  Goal: Occupational Therapy Goal  Description: Goals to be met by: 4/4/23     Patient will increase functional independence with ADLs by performing:    LE Dressing with Modified [place].  Grooming while standing with Modified [place].  Toileting from toilet with Modified [place] for hygiene and clothing management.   Supine to sit with Modified [place].  Step transfer with Modified [place]  Toilet transfer to toilet with Modified [place].  Increased functional strength to WFL for self care skills and functional mobility.  Upper extremity exercise program x10 reps per handout, with independence.    Outcome: Ongoing, Progressing   Myesha Quinones is a 83 y.o. female with a medical diagnosis of Community acquired pneumonia.  Performance deficits affecting function are weakness, impaired endurance, impaired self care skills, impaired functional mobility, gait instability, impaired balance, decreased lower extremity function, impaired cardiopulmonary response to activity.    Pt found in bed, agreeable to therapy.  Pt is progressing towards goals. Continue OT services to address functional goals, progressing as able.

## 2024-03-05 NOTE — PROGRESS NOTES
[facility] Medicine  Progress Note    Patient Name: Myesha Quinones  MRN: [id number]  Patient Class: IP- Inpatient   Admission Date: 3/4/2024  Length of Stay: 0 days  Attending Physician: Charlotte Villalobos MD  Primary Care Provider: Dayton Michael MD        Subjective:     Principal Problem:Community acquired pneumonia        HPI:  83-year-old woman with ESRD on PD, type 2 diabetes, hypertension, asthma/COPD, HLD, CAD breast CA presented to the ER for notion of CP. Patient was jus recently admitted to the hospital for metabolic encephalopathy and discharge one day ago.  Per family since discharge she was not herself and had multiple complaints, with difficulty sleeping, decrease appetite, fatigue, generalized weakness. The night of the admission she wake up with left sided chest pain, type pressure, radiating to her back, aggravated by deep breathing, no relieving factors, associated with SOB. At home he BS was high around 250 per family. She was given 15 units of lantus. She was brought to ED. In the ED she was found to have hypoglycemia, as well as elevated BP and decreased oxygen saturation. When seen in the ER, she was moaning with severe CP, in mild to moderate distress, O2 sat aroung 96% on 3L oxygen. Labs in the ER with elevated troponin 0.034, , H/H 8.2/23.9, low calcium,elevated ALT 49, high PTH intact 541.6, low sodium 132. CXR There is bilateral pattern of interstitial and patchy alveolar infiltrates which has been progressing compare to previous admission. She has been admitted for HCAP and R/O ACS.    Overview/Hospital Course:  No notes on file    Interval History: c/o productive cough and feeling tired. On 2 L oxygen NC.     Review of Systems  Objective:     Vital Signs (Most Recent):  Temp: 96.3 °F (35.7 °C) (03/05/24 1124)  Pulse: 76 (03/05/24 1346)  Resp: 18 (03/05/24 1346)  BP: (!) 116/54 (03/05/24 1124)  SpO2: 98 % (03/05/24 1346) Vital Signs (24h Range):  Temp:  [96.3  °F (35.7 °C)-98.5 °F (36.9 °C)] 96.3 °F (35.7 °C)  Pulse:  [60-85] 76  Resp:  [17-18] 18  SpO2:  [92 %-98 %] 98 %  BP: (104-150)/(45-66) 116/54     Weight: 65.8 kg (145 lb 1 oz)  Body mass index is 25.7 kg/m².    Intake/Output Summary (Last 24 hours) at 3/5/2024 1535  Last data filed at 3/5/2024 0730  Gross per 24 hour   Intake --   Output 970 ml   Net -970 ml         Physical Exam  Vitals and nursing note reviewed.   Constitutional:       General: She is not in acute distress.     Comments: Elderly tired appearing female   Cardiovascular:      Rate and Rhythm: Normal rate and regular rhythm.      Heart sounds: Normal heart sounds. No murmur heard.  Pulmonary:      Effort: Pulmonary effort is normal. No respiratory distress.      Breath sounds: No wheezing.   Abdominal:      Palpations: Abdomen is soft.      Tenderness: There is no abdominal tenderness.      Comments: PD catheter in place   Musculoskeletal:      Right lower leg: No edema.      Left lower leg: No edema.   Skin:     General: Skin is warm and dry.      Findings: No bruising.   Neurological:      General: No focal deficit present.      Mental Status: She is alert and oriented to person, place, and time.   Psychiatric:         Mood and Affect: Mood normal.             Significant Labs: All pertinent labs within the past 24 hours have been reviewed.    Significant Imaging: I have reviewed all pertinent imaging results/findings within the past 24 hours.    Assessment/Plan:      * Community acquired pneumonia  Recently discharged from the hospital and readmitted with abnormal CXR  Continue zosyn, azithromycin for now  Respiratory culture sent   Blood culture NGTD    ?less likely pleuroperitoneal leak but possible in setting of PD. Repeat imaging tomorrow pending clinical improvement       Pleurodynia        ALT (SGPT) level raised  Likely hepato-renal syndrome  Monitor LFTs  With previous metabolic encephalopathy  Monitor for AMS  Consider ammonia level if  "worsening      Troponin level elevated  With chest pain  Mild changes on 12 lead EKG  R/O ACS  Last 2D echo 3/1/24 Left Ventricle: The left ventricle is normal in size. There is concentric remodeling. Septal motion is consistent with pacing. There is reduced systolic function. Biplane (2D) method of discs ejection fraction is 52%.   Will consult cardiology      Hypocalcemia  Patient has hypocalcemia due to ESRD related to vitamin D disorder which is currently uncontrolled, and has been confirmed with ionized and/or corrected calcium. Will replace calcium and monitor electrolytes closely. The latest calcium labs have been reviewed and are listed below.  Recent Labs   Lab 03/05/24  0317   CALCIUM 6.6*         No results for input(s): "CAION" in the last 24 hours.    Calcium supplement      ESRD on peritoneal dialysis  Resume PD  Nephrology consult  Monitor electrolytes      Acute hypoxemic respiratory failure  Patient with Hypoxic Respiratory failure which is Acute.  she is not on home oxygen. Supplemental oxygen was provided and noted-      .   Signs/symptoms of respiratory failure include- tachypnea, increased work of breathing, and lethargy. Contributing diagnoses includes - Pneumonia Labs and images were reviewed. Patient Has not had a recent ABG. Will treat underlying causes and adjust management of respiratory failure as follows- IV antibiotics     On 2 L oxygen NC. Wean as tolerated.     Essential hypertension  Chronic, controlled. Latest blood pressure and vitals reviewed-     Temp:  [97.5 °F (36.4 °C)-99.7 °F (37.6 °C)]   Pulse:  [66-70]   Resp:  [16-25]   BP: (127-169)/(58-70)   SpO2:  [93 %-97 %] .   Home meds for hypertension were reviewed and noted below.   Hypertension Medications               carvediloL (COREG) 25 MG tablet TAKE 2 TABLETS (50 MG TOTAL) BY MOUTH 2 (TWO) TIMES DAILY.    furosemide (LASIX) 40 MG tablet Take 1 tablet (40 mg total) by mouth once daily.    hydrALAZINE (APRESOLINE) 100 MG " tablet Take 1 tablet (100 mg total) by mouth 2 (two) times daily.    nitroGLYCERIN (NITROSTAT) 0.4 MG SL tablet Place 0.4 mg under the tongue every 5 (five) minutes as needed for Chest pain.     valsartan (DIOVAN) 160 MG tablet Take 160 mg by mouth once daily.            While in the hospital, will manage blood pressure as follows; Continue home antihypertensive regimen    Will utilize p.r.n. blood pressure medication only if patient's blood pressure greater than 180/110 and she develops symptoms such as worsening chest pain or shortness of breath.    Hyponatremia  Patient has hyponatremia which is controlled,We will aim to correct the sodium by 4-6mEq in 24 hours. We will monitor sodium Daily. The hyponatremia is due to Dehydration/hypovolemia, Heart Failure, and renal insufficiency. We will obtain the following studies: TSH, T4. We will treat the hyponatremia with Fluid restriction of:  1.5 liter per day. The patient's sodium results have been reviewed and are listed below.  Recent Labs   Lab 03/05/24  0317   *         Acute on chronic combined systolic and diastolic heart failure  With elevated BNP, not in acute decompensation  On lasix, volume removal with PD  Daily weight  I&Os      Hyperlipidemia associated with type 2 diabetes mellitus  Cont statin      Weakness  Likely from age related debility  Will consult PT  Encourage mobilization      COPD (chronic obstructive pulmonary disease)  Patient's COPD is controlled currently.  Patient is currently off COPD Pathway. Continue scheduled inhalers Antibiotics and Supplemental oxygen and monitor respiratory status closely.     Iron deficiency anemia  Patient's anemia is currently controlled. Has not received any PRBCs to date. Etiology likely d/t chronic disease due to Chronic Kidney Disease/ESRD  Current CBC reviewed-   Lab Results   Component Value Date    HGB 8.2 (L) 03/04/2024    HCT 23.9 (L) 03/04/2024     Monitor serial CBC and transfuse if patient  becomes hemodynamically unstable, symptomatic or H/H drops below 7/21.    Type 2 diabetes mellitus with stage 4 chronic kidney disease and hypertension  Creatine stable for now. BMP reviewed- noted Estimated Creatinine Clearance: 7.2 mL/min (A) (based on SCr of 5.3 mg/dL (H)). according to latest data. 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  Monitor UOP and serial BMP and adjust therapy as needed. Renally dose meds. Avoid nephrotoxic medications and procedures.    Coronary artery disease involving native coronary artery without angina pectoris - Non-obstructive 50% LAD  Patient with known CAD s/p  pacemaker and stent placement, which is controlled Will continue ASA and monitor for S/Sx of angina/ACS. Continue to monitor on telemetry.       VTE Risk Mitigation (From admission, onward)           Ordered     heparin (porcine) injection 5,000 Units  Every 8 hours         03/04/24 0601     IP VTE HIGH RISK PATIENT  Once         03/04/24 0601     Place sequential compression device  Until discontinued         03/04/24 0601                    Discharge Planning   ALIX:      Code Status: Full Code   Is the patient medically ready for discharge?:     Reason for patient still in hospital (select all that apply): Patient trending condition, Treatment, and Consult recommendations  Discharge Plan A: Home with family            Charlotte Villalobos MD  Department of Hospital Medicine   [facility]

## 2024-03-05 NOTE — PLAN OF CARE
SW met with pt at bedside during rounding with MD. No dc today. Pts family at bedside. SW will continue to follow pt throughout her transitions of care and assist with any dc needs.     Future Appointments   Date Time Provider Department Center   3/14/2024  9:30 AM CARRIE Arechiga MD [facility] OPHTHA [place]   4/10/2024  3:30 PM Kelvin Maki MD [facility] PULMSVC [facility]   4/17/2024  9:00 AM Eric Brooke PA-C [facility] UROGYN [facility]   4/24/2024 10:40 AM Dayton Michael MD [facility] MED [facility]   6/5/2024 12:15 PM EKG, APPT [facility] EKG [facility]   6/5/2024 12:40 PM COORDINATED DEVICE CHECK [facility] ARRHPRO [facility]   6/5/2024  1:30 PM Celeste Rogers NP [facility] ARRHYTH [facility]        03/05/24 0933   Rounds   Attendance Provider;;Assigned nurse;Pharmacist   Discharge Plan A Home with family   Why the patient remains in the hospital Requires continued medical care   Transition of Care Barriers None        No

## 2024-03-05 NOTE — ASSESSMENT & PLAN NOTE
Recently discharged from the hospital and readmitted with abnormal CXR  Continue zosyn, azithromycin for now  Respiratory culture sent   Blood culture NGTD    ?less likely pleuroperitoneal leak but possible in setting of PD. Repeat imaging tomorrow pending clinical improvement

## 2024-03-05 NOTE — ASSESSMENT & PLAN NOTE
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 Will replace calcium and monitor electrolytes closely. The latest calcium labs have been reviewed and are listed below.  Recent Labs   Lab 03/05/24  0317   CALCIUM 6.6*         No results for input(s): "CAION" in the last 24 hours.    Calcium supplement    "

## 2024-03-05 NOTE — ASSESSMENT & PLAN NOTE
With elevated BNP, not in acute decompensation  On lasix, volume removal with PD  Daily weight  I&Os

## 2024-03-05 NOTE — PROGRESS NOTES
03/04/24 1818   Peritoneal Dialysis   Exchange Type Cycler   Peritoneal Treatment Status Completed   Dianeal Solution Dextrose 1.5% in 2000 mL;Dextrose 1.5% in 6000 mL   Cycler Peritoneal Dialysis   Initial Drain Volume (mL) 5 mL   Effluent Appearance Light   Number of Cycles 3   Fill Volume (mL) 2500 mL   Total Volume (mL) 8000 mL   Dwell Time (specify hr/min) 1.27   Average Dwell Time (specify hr/min) 1.27   Net Positive (mL) 0 mL   Cycler PD Total UF (mL) 420 mL   Cycler Treatment Comments Completed without complications. Lab samples collected and secnt to lab per MD order. PD catheter clamped, capped, and secured to abdomen with adhesive cloth tape.

## 2024-03-06 ENCOUNTER — PATIENT OUTREACH (OUTPATIENT)
Dept: ADMINISTRATIVE | Facility: CLINIC | Age: 84
End: 2024-03-06
Payer: MEDICARE

## 2024-03-06 PROBLEM — G93.40 ACUTE ENCEPHALOPATHY: Status: ACTIVE | Noted: 2024-03-06

## 2024-03-06 LAB
AMMONIA PLAS-SCNC: 52 UMOL/L (ref 10–50)
ANION GAP SERPL CALC-SCNC: 18 MMOL/L (ref 8–16)
APPEARANCE FLD: CLEAR
APPEARANCE FLD: CLEAR
BACTERIA SPEC AEROBE CULT: NO GROWTH
BASOPHILS # BLD AUTO: 0.03 K/UL (ref 0–0.2)
BASOPHILS NFR BLD: 0.2 % (ref 0–1.9)
BODY FLD TYPE: NORMAL
BODY FLD TYPE: NORMAL
BUN SERPL-MCNC: 76 MG/DL (ref 8–23)
CALCIUM SERPL-MCNC: 6.8 MG/DL (ref 8.7–10.5)
CHLORIDE SERPL-SCNC: 96 MMOL/L (ref 95–110)
CO2 SERPL-SCNC: 19 MMOL/L (ref 23–29)
COLOR FLD: COLORLESS
COLOR FLD: YELLOW
CREAT SERPL-MCNC: 5.6 MG/DL (ref 0.5–1.4)
DIFFERENTIAL METHOD BLD: ABNORMAL
EOSINOPHIL # BLD AUTO: 0.1 K/UL (ref 0–0.5)
EOSINOPHIL NFR BLD: 0.6 % (ref 0–8)
ERYTHROCYTE [DISTWIDTH] IN BLOOD BY AUTOMATED COUNT: 13.3 % (ref 11.5–14.5)
EST. GFR  (NO RACE VARIABLE): 7 ML/MIN/1.73 M^2
FIO2: 0.3 %
GLUCOSE SERPL-MCNC: 202 MG/DL (ref 70–110)
HCT VFR BLD AUTO: 24.6 % (ref 37–48.5)
HGB BLD-MCNC: 8.1 G/DL (ref 12–16)
IMM GRANULOCYTES # BLD AUTO: 0.14 K/UL (ref 0–0.04)
IMM GRANULOCYTES NFR BLD AUTO: 1.1 % (ref 0–0.5)
LDH SERPL L TO P-CCNC: 208 U/L (ref 110–260)
LPM: 2
LYMPHOCYTES # BLD AUTO: 0.6 K/UL (ref 1–4.8)
LYMPHOCYTES NFR BLD: 4.4 % (ref 18–48)
MAGNESIUM SERPL-MCNC: 1.9 MG/DL (ref 1.6–2.6)
MCH RBC QN AUTO: 30.7 PG (ref 27–31)
MCHC RBC AUTO-ENTMCNC: 32.9 G/DL (ref 32–36)
MCV RBC AUTO: 93 FL (ref 82–98)
MONOCYTES # BLD AUTO: 0.8 K/UL (ref 0.3–1)
MONOCYTES NFR BLD: 6.1 % (ref 4–15)
MONOS+MACROS NFR FLD MANUAL: 100 %
MONOS+MACROS NFR FLD MANUAL: 2 %
NEUTROPHILS # BLD AUTO: 11.3 K/UL (ref 1.8–7.7)
NEUTROPHILS NFR BLD: 87.6 % (ref 38–73)
NEUTROPHILS NFR FLD MANUAL: 98 %
NRBC BLD-RTO: 0 /100 WBC
OHS QRS DURATION: 130 MS
OHS QTC CALCULATION: 514 MS
PCO2 BLDA: 52 MMHG (ref 35–45)
PH SMN: 7.27 [PH] (ref 7.35–7.45)
PHOSPHATE SERPL-MCNC: 9 MG/DL (ref 2.7–4.5)
PLATELET # BLD AUTO: 202 K/UL (ref 150–450)
PMV BLD AUTO: 10.7 FL (ref 9.2–12.9)
PO2 BLDA: 41.6 MMHG (ref 40–60)
POC BASE DEFICIT: -3 MMOL/L (ref -2–2)
POC HCO3: 23.9 MMOL/L (ref 24–28)
POC PERFORMED BY: ABNORMAL
POC SATURATED O2: 71.6 % (ref 95–100)
POCT GLUCOSE: 159 MG/DL (ref 70–110)
POCT GLUCOSE: 209 MG/DL (ref 70–110)
POCT GLUCOSE: 218 MG/DL (ref 70–110)
POCT GLUCOSE: 92 MG/DL (ref 70–110)
POTASSIUM SERPL-SCNC: 3.7 MMOL/L (ref 3.5–5.1)
RBC # BLD AUTO: 2.64 M/UL (ref 4–5.4)
SODIUM SERPL-SCNC: 133 MMOL/L (ref 136–145)
SPECIMEN SOURCE: ABNORMAL
WBC # BLD AUTO: 12.88 K/UL (ref 3.9–12.7)
WBC # FLD: 3 /CU MM
WBC # FLD: 5570 /CU MM

## 2024-03-06 PROCEDURE — 25000003 PHARM REV CODE 250: Performed by: FAMILY MEDICINE

## 2024-03-06 PROCEDURE — 82570 ASSAY OF URINE CREATININE: CPT | Performed by: STUDENT IN AN ORGANIZED HEALTH CARE EDUCATION/TRAINING PROGRAM

## 2024-03-06 PROCEDURE — 36415 COLL VENOUS BLD VENIPUNCTURE: CPT | Performed by: FAMILY MEDICINE

## 2024-03-06 PROCEDURE — 87070 CULTURE OTHR SPECIMN AEROBIC: CPT | Performed by: STUDENT IN AN ORGANIZED HEALTH CARE EDUCATION/TRAINING PROGRAM

## 2024-03-06 PROCEDURE — 87102 FUNGUS ISOLATION CULTURE: CPT | Performed by: STUDENT IN AN ORGANIZED HEALTH CARE EDUCATION/TRAINING PROGRAM

## 2024-03-06 PROCEDURE — 99900035 HC TECH TIME PER 15 MIN (STAT)

## 2024-03-06 PROCEDURE — 87070 CULTURE OTHR SPECIMN AEROBIC: CPT | Mod: 59 | Performed by: STUDENT IN AN ORGANIZED HEALTH CARE EDUCATION/TRAINING PROGRAM

## 2024-03-06 PROCEDURE — 97530 THERAPEUTIC ACTIVITIES: CPT | Mod: CO

## 2024-03-06 PROCEDURE — 63600175 PHARM REV CODE 636 W HCPCS: Performed by: HOSPITALIST

## 2024-03-06 PROCEDURE — 11000001 HC ACUTE MED/SURG PRIVATE ROOM

## 2024-03-06 PROCEDURE — 51702 INSERT TEMP BLADDER CATH: CPT

## 2024-03-06 PROCEDURE — 63600175 PHARM REV CODE 636 W HCPCS

## 2024-03-06 PROCEDURE — 63600175 PHARM REV CODE 636 W HCPCS: Performed by: STUDENT IN AN ORGANIZED HEALTH CARE EDUCATION/TRAINING PROGRAM

## 2024-03-06 PROCEDURE — 84478 ASSAY OF TRIGLYCERIDES: CPT | Performed by: STUDENT IN AN ORGANIZED HEALTH CARE EDUCATION/TRAINING PROGRAM

## 2024-03-06 PROCEDURE — 88112 CYTOPATH CELL ENHANCE TECH: CPT | Performed by: PATHOLOGY

## 2024-03-06 PROCEDURE — 84157 ASSAY OF PROTEIN OTHER: CPT | Performed by: STUDENT IN AN ORGANIZED HEALTH CARE EDUCATION/TRAINING PROGRAM

## 2024-03-06 PROCEDURE — 27000221 HC OXYGEN, UP TO 24 HOURS

## 2024-03-06 PROCEDURE — 89051 BODY FLUID CELL COUNT: CPT | Mod: 91 | Performed by: INTERNAL MEDICINE

## 2024-03-06 PROCEDURE — 94761 N-INVAS EAR/PLS OXIMETRY MLT: CPT

## 2024-03-06 PROCEDURE — 88305 TISSUE EXAM BY PATHOLOGIST: CPT | Mod: 26,,, | Performed by: PATHOLOGY

## 2024-03-06 PROCEDURE — 84311 SPECTROPHOTOMETRY: CPT | Performed by: STUDENT IN AN ORGANIZED HEALTH CARE EDUCATION/TRAINING PROGRAM

## 2024-03-06 PROCEDURE — 87116 MYCOBACTERIA CULTURE: CPT | Performed by: STUDENT IN AN ORGANIZED HEALTH CARE EDUCATION/TRAINING PROGRAM

## 2024-03-06 PROCEDURE — 90945 DIALYSIS ONE EVALUATION: CPT

## 2024-03-06 PROCEDURE — 63600175 PHARM REV CODE 636 W HCPCS: Performed by: FAMILY MEDICINE

## 2024-03-06 PROCEDURE — 82042 OTHER SOURCE ALBUMIN QUAN EA: CPT | Performed by: STUDENT IN AN ORGANIZED HEALTH CARE EDUCATION/TRAINING PROGRAM

## 2024-03-06 PROCEDURE — 36415 COLL VENOUS BLD VENIPUNCTURE: CPT | Mod: XB | Performed by: STUDENT IN AN ORGANIZED HEALTH CARE EDUCATION/TRAINING PROGRAM

## 2024-03-06 PROCEDURE — 51798 US URINE CAPACITY MEASURE: CPT

## 2024-03-06 PROCEDURE — 25000003 PHARM REV CODE 250: Performed by: STUDENT IN AN ORGANIZED HEALTH CARE EDUCATION/TRAINING PROGRAM

## 2024-03-06 PROCEDURE — 87206 SMEAR FLUORESCENT/ACID STAI: CPT | Performed by: STUDENT IN AN ORGANIZED HEALTH CARE EDUCATION/TRAINING PROGRAM

## 2024-03-06 PROCEDURE — 85025 COMPLETE CBC W/AUTO DIFF WBC: CPT | Performed by: FAMILY MEDICINE

## 2024-03-06 PROCEDURE — 88305 TISSUE EXAM BY PATHOLOGIST: CPT | Performed by: PATHOLOGY

## 2024-03-06 PROCEDURE — 87205 SMEAR GRAM STAIN: CPT | Performed by: STUDENT IN AN ORGANIZED HEALTH CARE EDUCATION/TRAINING PROGRAM

## 2024-03-06 PROCEDURE — 82140 ASSAY OF AMMONIA: CPT | Performed by: STUDENT IN AN ORGANIZED HEALTH CARE EDUCATION/TRAINING PROGRAM

## 2024-03-06 PROCEDURE — 63600175 PHARM REV CODE 636 W HCPCS: Performed by: NURSE PRACTITIONER

## 2024-03-06 PROCEDURE — 82150 ASSAY OF AMYLASE: CPT | Performed by: STUDENT IN AN ORGANIZED HEALTH CARE EDUCATION/TRAINING PROGRAM

## 2024-03-06 PROCEDURE — 25000003 PHARM REV CODE 250: Performed by: INTERNAL MEDICINE

## 2024-03-06 PROCEDURE — 84100 ASSAY OF PHOSPHORUS: CPT | Performed by: FAMILY MEDICINE

## 2024-03-06 PROCEDURE — 83615 LACTATE (LD) (LDH) ENZYME: CPT | Performed by: STUDENT IN AN ORGANIZED HEALTH CARE EDUCATION/TRAINING PROGRAM

## 2024-03-06 PROCEDURE — 87210 SMEAR WET MOUNT SALINE/INK: CPT | Performed by: STUDENT IN AN ORGANIZED HEALTH CARE EDUCATION/TRAINING PROGRAM

## 2024-03-06 PROCEDURE — 63700000 PHARM REV CODE 250 ALT 637 W/O HCPCS: Performed by: HOSPITALIST

## 2024-03-06 PROCEDURE — 83615 LACTATE (LD) (LDH) ENZYME: CPT | Mod: 91 | Performed by: STUDENT IN AN ORGANIZED HEALTH CARE EDUCATION/TRAINING PROGRAM

## 2024-03-06 PROCEDURE — 25000003 PHARM REV CODE 250: Performed by: REGISTERED NURSE

## 2024-03-06 PROCEDURE — 25000003 PHARM REV CODE 250: Performed by: HOSPITALIST

## 2024-03-06 PROCEDURE — 83735 ASSAY OF MAGNESIUM: CPT | Performed by: FAMILY MEDICINE

## 2024-03-06 PROCEDURE — 87015 SPECIMEN INFECT AGNT CONCNTJ: CPT | Performed by: STUDENT IN AN ORGANIZED HEALTH CARE EDUCATION/TRAINING PROGRAM

## 2024-03-06 PROCEDURE — 88112 CYTOPATH CELL ENHANCE TECH: CPT | Mod: 26,,, | Performed by: PATHOLOGY

## 2024-03-06 PROCEDURE — 0W9B3ZZ DRAINAGE OF LEFT PLEURAL CAVITY, PERCUTANEOUS APPROACH: ICD-10-PCS | Performed by: STUDENT IN AN ORGANIZED HEALTH CARE EDUCATION/TRAINING PROGRAM

## 2024-03-06 PROCEDURE — 94799 UNLISTED PULMONARY SVC/PX: CPT | Mod: XB

## 2024-03-06 PROCEDURE — 87206 SMEAR FLUORESCENT/ACID STAI: CPT | Mod: 91 | Performed by: STUDENT IN AN ORGANIZED HEALTH CARE EDUCATION/TRAINING PROGRAM

## 2024-03-06 PROCEDURE — 25000242 PHARM REV CODE 250 ALT 637 W/ HCPCS: Performed by: STUDENT IN AN ORGANIZED HEALTH CARE EDUCATION/TRAINING PROGRAM

## 2024-03-06 PROCEDURE — 80048 BASIC METABOLIC PNL TOTAL CA: CPT | Performed by: FAMILY MEDICINE

## 2024-03-06 PROCEDURE — 94640 AIRWAY INHALATION TREATMENT: CPT

## 2024-03-06 PROCEDURE — 82945 GLUCOSE OTHER FLUID: CPT | Performed by: STUDENT IN AN ORGANIZED HEALTH CARE EDUCATION/TRAINING PROGRAM

## 2024-03-06 RX ORDER — MUPIROCIN 20 MG/G
OINTMENT TOPICAL 2 TIMES DAILY
Status: DISCONTINUED | OUTPATIENT
Start: 2024-03-07 | End: 2024-03-11 | Stop reason: HOSPADM

## 2024-03-06 RX ORDER — FUROSEMIDE 10 MG/ML
80 INJECTION INTRAMUSCULAR; INTRAVENOUS ONCE
Status: DISCONTINUED | OUTPATIENT
Start: 2024-03-06 | End: 2024-03-06

## 2024-03-06 RX ORDER — HYDROXYZINE HYDROCHLORIDE 25 MG/1
25 TABLET, FILM COATED ORAL 3 TIMES DAILY PRN
Status: DISCONTINUED | OUTPATIENT
Start: 2024-03-06 | End: 2024-03-06

## 2024-03-06 RX ORDER — SEVELAMER CARBONATE 800 MG/1
1600 TABLET, FILM COATED ORAL
Status: DISCONTINUED | OUTPATIENT
Start: 2024-03-06 | End: 2024-03-07

## 2024-03-06 RX ORDER — LIDOCAINE 50 MG/G
1 PATCH TOPICAL
Status: DISCONTINUED | OUTPATIENT
Start: 2024-03-07 | End: 2024-03-11 | Stop reason: HOSPADM

## 2024-03-06 RX ORDER — FUROSEMIDE 10 MG/ML
60 INJECTION INTRAMUSCULAR; INTRAVENOUS EVERY 12 HOURS
Status: DISCONTINUED | OUTPATIENT
Start: 2024-03-06 | End: 2024-03-11 | Stop reason: HOSPADM

## 2024-03-06 RX ORDER — FUROSEMIDE 10 MG/ML
60 INJECTION INTRAMUSCULAR; INTRAVENOUS ONCE
Status: DISCONTINUED | OUTPATIENT
Start: 2024-03-06 | End: 2024-03-06

## 2024-03-06 RX ORDER — HYDROXYZINE HYDROCHLORIDE 10 MG/1
10 TABLET, FILM COATED ORAL 3 TIMES DAILY PRN
Status: DISCONTINUED | OUTPATIENT
Start: 2024-03-06 | End: 2024-03-11 | Stop reason: HOSPADM

## 2024-03-06 RX ORDER — CALCIUM CARBONATE 200(500)MG
1000 TABLET,CHEWABLE ORAL 2 TIMES DAILY
Status: COMPLETED | OUTPATIENT
Start: 2024-03-06 | End: 2024-03-06

## 2024-03-06 RX ADMIN — FUROSEMIDE 60 MG: 10 INJECTION, SOLUTION INTRAMUSCULAR; INTRAVENOUS at 09:03

## 2024-03-06 RX ADMIN — GUAIFENESIN 600 MG: 600 TABLET, EXTENDED RELEASE ORAL at 09:03

## 2024-03-06 RX ADMIN — HEPARIN SODIUM 5000 UNITS: 5000 INJECTION INTRAVENOUS; SUBCUTANEOUS at 05:03

## 2024-03-06 RX ADMIN — SODIUM BICARBONATE 650 MG: 650 TABLET ORAL at 05:03

## 2024-03-06 RX ADMIN — CARVEDILOL 3.12 MG: 3.12 TABLET, FILM COATED ORAL at 09:03

## 2024-03-06 RX ADMIN — PRAVASTATIN SODIUM 40 MG: 40 TABLET ORAL at 10:03

## 2024-03-06 RX ADMIN — SODIUM BICARBONATE 650 MG: 650 TABLET ORAL at 01:03

## 2024-03-06 RX ADMIN — INSULIN ASPART 2 UNITS: 100 INJECTION, SOLUTION INTRAVENOUS; SUBCUTANEOUS at 05:03

## 2024-03-06 RX ADMIN — Medication 100 MCG: at 10:03

## 2024-03-06 RX ADMIN — INSULIN DETEMIR 8 UNITS: 100 INJECTION, SOLUTION SUBCUTANEOUS at 09:03

## 2024-03-06 RX ADMIN — IPRATROPIUM BROMIDE AND ALBUTEROL SULFATE 3 ML: 2.5; .5 SOLUTION RESPIRATORY (INHALATION) at 01:03

## 2024-03-06 RX ADMIN — IPRATROPIUM BROMIDE AND ALBUTEROL SULFATE 3 ML: 2.5; .5 SOLUTION RESPIRATORY (INHALATION) at 07:03

## 2024-03-06 RX ADMIN — CHOLECALCIFEROL TAB 25 MCG (1000 UNIT) 2000 UNITS: 25 TAB at 10:03

## 2024-03-06 RX ADMIN — ACETAMINOPHEN 650 MG: 325 TABLET ORAL at 01:03

## 2024-03-06 RX ADMIN — HEPARIN SODIUM 5000 UNITS: 5000 INJECTION INTRAVENOUS; SUBCUTANEOUS at 09:03

## 2024-03-06 RX ADMIN — PIPERACILLIN AND TAZOBACTAM 4.5 G: 4; .5 INJECTION, POWDER, LYOPHILIZED, FOR SOLUTION INTRAVENOUS; PARENTERAL at 09:03

## 2024-03-06 RX ADMIN — CALCIUM CARBONATE (ANTACID) CHEW TAB 500 MG 1000 MG: 500 CHEW TAB at 10:03

## 2024-03-06 RX ADMIN — VANCOMYCIN HYDROCHLORIDE 1250 MG: 1.25 INJECTION, POWDER, LYOPHILIZED, FOR SOLUTION INTRAVENOUS at 03:03

## 2024-03-06 RX ADMIN — SODIUM BICARBONATE 650 MG: 650 TABLET ORAL at 09:03

## 2024-03-06 RX ADMIN — POTASSIUM BICARBONATE 10 MEQ: 391 TABLET, EFFERVESCENT ORAL at 01:03

## 2024-03-06 RX ADMIN — LIDOCAINE 1 PATCH: 50 PATCH CUTANEOUS at 11:03

## 2024-03-06 RX ADMIN — ASPIRIN 81 MG CHEWABLE TABLET 81 MG: 81 TABLET CHEWABLE at 10:03

## 2024-03-06 RX ADMIN — IPRATROPIUM BROMIDE AND ALBUTEROL SULFATE 3 ML: 2.5; .5 SOLUTION RESPIRATORY (INHALATION) at 12:03

## 2024-03-06 RX ADMIN — LIDOCAINE 5% 1 PATCH: 700 PATCH TOPICAL at 11:03

## 2024-03-06 RX ADMIN — AZITHROMYCIN DIHYDRATE 500 MG: 250 TABLET ORAL at 10:03

## 2024-03-06 RX ADMIN — SODIUM BICARBONATE 650 MG: 650 TABLET ORAL at 10:03

## 2024-03-06 RX ADMIN — POLYETHYLENE GLYCOL 3350 17 G: 17 POWDER, FOR SOLUTION ORAL at 10:03

## 2024-03-06 RX ADMIN — HEPARIN SODIUM 5000 UNITS: 5000 INJECTION INTRAVENOUS; SUBCUTANEOUS at 01:03

## 2024-03-06 RX ADMIN — CALCIUM CARBONATE (ANTACID) CHEW TAB 500 MG 1000 MG: 500 CHEW TAB at 11:03

## 2024-03-06 RX ADMIN — SEVELAMER CARBONATE 800 MG: 800 TABLET, FILM COATED ORAL at 10:03

## 2024-03-06 RX ADMIN — GUAIFENESIN 600 MG: 600 TABLET, EXTENDED RELEASE ORAL at 10:03

## 2024-03-06 RX ADMIN — INSULIN ASPART 1 UNITS: 100 INJECTION, SOLUTION INTRAVENOUS; SUBCUTANEOUS at 09:03

## 2024-03-06 NOTE — ASSESSMENT & PLAN NOTE
Patient's anemia is currently controlled. Has not received any PRBCs to date. Etiology likely d/t chronic disease due to Chronic Kidney Disease/ESRD  Current CBC reviewed-   Lab Results   Component Value Date    HGB 8.1 (L) 03/06/2024    HCT 24.6 (L) 03/06/2024     Monitor serial CBC and transfuse if patient becomes hemodynamically unstable, symptomatic or H/H drops below 7/21.

## 2024-03-06 NOTE — ASSESSMENT & PLAN NOTE
Recently discharged from the hospital and readmitted with abnormal CXR  Continue zosyn, azithromycin, vancomycin   Respiratory gram stain - no organisms seen  Blood culture NGTD    Worsening left pleural effusion, consolidation. ?progressively worsening pneumonia/ parapneumonic effusion, ?atypical vs fungal organism,?pleuroperitoneal leak possible in setting of PD    On chart review, prior h/o ABPA, ?MAC positive sputum cultures. Unclear if patient received complete treatment.     Pulmonary consult - recommendations reviewed   S/p bedside thoracentesis on 3/6 - fluid studies suggestive of exudative effusion, cultures/ cytology pending

## 2024-03-06 NOTE — PROGRESS NOTES
[facility] Medicine  Progress Note    Patient Name: Myesha Quinones  MRN: [id number]  Patient Class: IP- Inpatient   Admission Date: 3/4/2024  Length of Stay: 1 days  Attending Physician: Charlotte Villalobos MD  Primary Care Provider: Dayton Michael MD        Subjective:     Principal Problem:Community acquired pneumonia        HPI:  83-year-old woman with ESRD on PD, type 2 diabetes, hypertension, asthma/COPD, HLD, CAD breast CA presented to the ER for notion of CP. Patient was jus recently admitted to the hospital for metabolic encephalopathy and discharge one day ago.  Per family since discharge she was not herself and had multiple complaints, with difficulty sleeping, decrease appetite, fatigue, generalized weakness. The night of the admission she wake up with left sided chest pain, type pressure, radiating to her back, aggravated by deep breathing, no relieving factors, associated with SOB. At home he BS was high around 250 per family. She was given 15 units of lantus. She was brought to ED. In the ED she was found to have hypoglycemia, as well as elevated BP and decreased oxygen saturation. When seen in the ER, she was moaning with severe CP, in mild to moderate distress, O2 sat aroung 96% on 3L oxygen. Labs in the ER with elevated troponin 0.034, , H/H 8.2/23.9, low calcium,elevated ALT 49, high PTH intact 541.6, low sodium 132. CXR There is bilateral pattern of interstitial and patchy alveolar infiltrates which has been progressing compare to previous admission. She has been admitted for HCAP and R/O ACS.    Overview/Hospital Course:  No notes on file    Interval History: c/o poor sleep last night due to uncomfortable positioning. C/o feeling tired and sleepy this morning. Received hydroxyzine last night for anxiety. Family present at bedside.     Review of Systems  Objective:     Vital Signs (Most Recent):  Temp: 98.3 °F (36.8 °C) (03/06/24 1120)  Pulse: 82 (03/06/24 1325)  Resp: 20  (03/06/24 1325)  BP: (!) 127/59 (03/06/24 1120)  SpO2: (!) 92 % (03/06/24 1325) Vital Signs (24h Range):  Temp:  [97.2 °F (36.2 °C)-98.4 °F (36.9 °C)] 98.3 °F (36.8 °C)  Pulse:  [71-94] 82  Resp:  [16-20] 20  SpO2:  [92 %-97 %] 92 %  BP: (102-131)/(50-65) 127/59     Weight: 65.8 kg (145 lb 1 oz)  Body mass index is 25.7 kg/m².  No intake or output data in the 24 hours ending 03/06/24 1442        Physical Exam  Vitals and nursing note reviewed.   Constitutional:       General: She is not in acute distress.     Comments: Sleepy, elderly tired appearing female   Cardiovascular:      Rate and Rhythm: Normal rate and regular rhythm.      Heart sounds: Normal heart sounds. No murmur heard.  Pulmonary:      Effort: Pulmonary effort is normal. No respiratory distress.      Breath sounds: No wheezing.   Abdominal:      Palpations: Abdomen is soft.      Tenderness: There is no abdominal tenderness.      Comments: PD catheter in place   Musculoskeletal:      Right lower leg: No edema.      Left lower leg: No edema.   Skin:     General: Skin is warm and dry.      Findings: No bruising.   Neurological:      General: No focal deficit present.             Significant Labs: All pertinent labs within the past 24 hours have been reviewed.    Significant Imaging: I have reviewed all pertinent imaging results/findings within the past 24 hours.    Assessment/Plan:      * Community acquired pneumonia  Recently discharged from the hospital and readmitted with abnormal CXR  Continue zosyn, azithromycin  Respiratory gram stain - no organisms seen  Blood culture NGTD    Worsening left pleural effusion, consolidation. ?progressively worsening pneumonia/ parapneumonic effusion, ?atypical vs fungal organism,?pleuroperitoneal leak possible in setting of PD    On chart review, prior h/o ABPA, ?MAC positive sputum cultures. Unclear if patient received complete treatment.     Add vancomycin to broaden antibiotic spectrum, order CT chest, pulmonary  "consult      Acute encephalopathy  Suspected toxic/ metabolic etiology   2/2? Infectious vs other, ?uremia    Avoid sedating medications, add VBG/ ammonia levels      Pleurodynia  ?in setting of pneumonia, pain improved with lidocaine patch       ALT (SGPT) level raised  Likely hepato-renal syndrome  Monitor LFTs  With previous metabolic encephalopathy  Monitor for AMS  Consider ammonia level if worsening      Troponin level elevated  With chest pain  Mild changes on 12 lead EKG  R/O ACS  Last 2D echo 3/1/24 Left Ventricle: The left ventricle is normal in size. There is concentric remodeling. Septal motion is consistent with pacing. There is reduced systolic function. Biplane (2D) method of discs ejection fraction is 52%.   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 Will replace calcium and monitor electrolytes closely. The latest calcium labs have been reviewed and are listed below.  Recent Labs   Lab 03/06/24  0314   CALCIUM 6.8*         No results for input(s): "CAION" in the last 24 hours.    Calcium supplement      ESRD on peritoneal dialysis  Resume PD  Nephrology consult  Monitor electrolytes      Acute hypoxemic respiratory failure  Patient with Hypoxic Respiratory failure which is Acute.  she is not on home oxygen. Supplemental oxygen was provided and noted-      .   Signs/symptoms of respiratory failure include- tachypnea, increased work of breathing, and lethargy. Contributing diagnoses includes - Pneumonia Labs and images were reviewed. Patient Has not had a recent ABG. Will treat underlying causes and adjust management of respiratory failure as follows- IV antibiotics     On 2 L oxygen NC. Wean as tolerated.     Essential hypertension  Chronic, controlled. Latest blood pressure and " vitals reviewed-     Temp:  [97.2 °F (36.2 °C)-98.4 °F (36.9 °C)]   Pulse:  [71-94]   Resp:  [16-20]   BP: (102-131)/(50-65)   SpO2:  [92 %-97 %] .   Home meds for hypertension were reviewed and noted below.   Hypertension Medications               carvediloL (COREG) 25 MG tablet TAKE 2 TABLETS (50 MG TOTAL) BY MOUTH 2 (TWO) TIMES DAILY.    furosemide (LASIX) 40 MG tablet Take 1 tablet (40 mg total) by mouth once daily.    hydrALAZINE (APRESOLINE) 100 MG tablet Take 1 tablet (100 mg total) by mouth 2 (two) times daily.    nitroGLYCERIN (NITROSTAT) 0.4 MG SL tablet Place 0.4 mg under the tongue every 5 (five) minutes as needed for Chest pain.     valsartan (DIOVAN) 160 MG tablet Take 160 mg by mouth once daily.            While in the hospital, will manage blood pressure as follows; Adjust home antihypertensive regimen as follows- holding all antihypertensives due to borderline low BP    Will utilize p.r.n. blood pressure medication only if patient's blood pressure greater than 180/110 and she develops symptoms such as worsening chest pain or shortness of breath.    Hyponatremia  Patient has hyponatremia which is controlled,We will aim to correct the sodium by 4-6mEq in 24 hours. We will monitor sodium Daily. The hyponatremia is due to Dehydration/hypovolemia, Heart Failure, and renal insufficiency. We will obtain the following studies: TSH, T4. We will treat the hyponatremia with Fluid restriction of:  1.5 liter per day. The patient's sodium results have been reviewed and are listed below.  Recent Labs   Lab 03/06/24  0314   *         Acute on chronic combined systolic and diastolic heart failure  With elevated BNP, not in acute decompensation  On lasix, volume removal with PD  Daily weight  I&Os      Hyperlipidemia associated with type 2 diabetes mellitus  Cont statin      Weakness  Likely from age related debility  Will consult PT  Encourage mobilization      COPD (chronic obstructive pulmonary  disease)  Patient's COPD is controlled currently.  Patient is currently off COPD Pathway. Continue scheduled inhalers Antibiotics and Supplemental oxygen and monitor respiratory status closely.     Iron deficiency anemia  Patient's anemia is currently controlled. Has not received any PRBCs to date. Etiology likely d/t chronic disease due to Chronic Kidney Disease/ESRD  Current CBC reviewed-   Lab Results   Component Value Date    HGB 8.1 (L) 03/06/2024    HCT 24.6 (L) 03/06/2024     Monitor serial CBC and transfuse if patient becomes hemodynamically unstable, symptomatic or H/H drops below 7/21.    Type 2 diabetes mellitus with stage 4 chronic kidney disease and hypertension  Creatine stable for now. BMP reviewed- noted Estimated Creatinine Clearance: 6.8 mL/min (A) (based on SCr of 5.6 mg/dL (H)). according to latest data. 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  Monitor UOP and serial BMP and adjust therapy as needed. Renally dose meds. Avoid nephrotoxic medications and procedures.    Coronary artery disease involving native coronary artery without angina pectoris - Non-obstructive 50% LAD  Patient with known CAD s/p  pacemaker and stent placement, which is controlled Will continue ASA and monitor for S/Sx of angina/ACS. Continue to monitor on telemetry.       VTE Risk Mitigation (From admission, onward)           Ordered     heparin (porcine) injection 5,000 Units  Every 8 hours         03/04/24 0601     IP VTE HIGH RISK PATIENT  Once         03/04/24 0601     Place sequential compression device  Until discontinued         03/04/24 0601                    Discharge Planning   ALIX:      Code Status: Full Code   Is the patient medically ready for discharge?:     Reason for patient still in hospital (select all that apply): Patient trending condition, Treatment, and Consult recommendations  Discharge Plan A: Home with family, Home Health            Charlotte Villalobos MD  Department of Hospital Medicine   [facility]  Telemetry

## 2024-03-06 NOTE — PLAN OF CARE
Problem: Occupational Therapy  Goal: Occupational Therapy Goal  Description: Goals to be met by: 4/4/23     Patient will increase functional independence with ADLs by performing:    LE Dressing with Modified [place].  Grooming while standing with Modified [place].  Toileting from toilet with Modified [place] for hygiene and clothing management.   Supine to sit with Modified [place].  Step transfer with Modified [place]  Toilet transfer to toilet with Modified [place].  Increased functional strength to WFL for self care skills and functional mobility.  Upper extremity exercise program x10 reps per handout, with independence.    Outcome: Ongoing, Progressing   Myesha Quinones is a 83 y.o. female with a medical diagnosis of Community acquired pneumonia.   Performance deficits affecting function are weakness, impaired endurance, impaired self care skills, impaired functional mobility, gait instability, impaired balance, decreased upper extremity function, decreased lower extremity function, impaired coordination, impaired fine motor, impaired cardiopulmonary response to activity  .    Pt found in bed, awake and reporting not feeling well enough to work with therapy.  Pt O x 3. Pt noted to have involuntary muscle twitching/mvmts throughout body at rest and with mvmt as well as pin point pupils. Pts son in law encouraging pt to participate with therapy.  Pt required increased assistance with therapy compared to yesterdays session. She was unable to ambulate and required Min A for sitting balance while sitting EOB. Nurse Eunice notified of changes noted and came to pts bedside.  Nurse reports she messaged pts medical team. Continue OT services to address functional goals, progressing as able.

## 2024-03-06 NOTE — PROGRESS NOTES
Pharmacokinetic Initial Assessment: IV Vancomycin    Assessment/Plan:    Initiate intravenous vancomycin with loading dose of 1250 mg (20 mg per kg) once with subsequent doses when random concentrations are less than 20 mcg/mL  Desired empiric serum trough concentration is 15 to 20 mcg/mL  Draw vancomycin random level on 06/07/2024 at 1530.  Pharmacy will continue to follow and monitor vancomycin.      Please contact pharmacy at extension [phone] with any questions regarding this assessment.     Thank you for the consult,   Nadeen Oneal       Patient brief summary:  Myesha Quinones is a 83 y.o. female initiated on antimicrobial therapy with IV Vancomycin for treatment of suspected  pnuemonia    Drug Allergies:   Review of patient's allergies indicates:   Allergen Reactions    Iodine and iodide containing products Hives and Other (See Comments)     Not specified     Nifedipine      weakness       Actual Body Weight:   65.8 kg    Renal Function:   Estimated Creatinine Clearance: 6.8 mL/min (A) (based on SCr of 5.6 mg/dL (H)).,     Dialysis Method (if applicable):  peritoneal dialysis    CBC (last 72 hours):  Recent Labs   Lab Result Units 03/04/24  0430 03/05/24 0317 03/06/24 0315   WBC K/uL 12.07 10.57 12.88*   Hemoglobin g/dL 8.2* 8.5* 8.1*   Hematocrit % 23.9* 26.3* 24.6*   Platelets K/uL 261 231 202   Gran % % 84.8* 85.1* 87.6*   Lymph % % 6.9* 7.3* 4.4*   Mono % % 4.9 5.8 6.1   Eosinophil % % 2.0 0.7 0.6   Basophil % % 0.3 0.2 0.2   Differential Method  Automated Automated Automated       Metabolic Panel (last 72 hours):  Recent Labs   Lab Result Units 03/04/24  0430 03/04/24  0550 03/05/24 0317 03/06/24  0314   Sodium mmol/L 132*  --  131* 133*   Potassium mmol/L 3.8  --  3.8 3.7   Chloride mmol/L 99  --  97 96   CO2 mmol/L 14*  --  17* 19*   Glucose mg/dL 42*  --  167* 202*   BUN mg/dL 98*  --  86* 76*   Creatinine mg/dL 5.0*  --  5.3* 5.6*   Albumin g/dL 2.6*  --   --   --    Total Bilirubin  "mg/dL 0.3  --   --   --    Alkaline Phosphatase U/L 133  --   --   --    AST U/L 38  --   --   --    ALT U/L 49*  --   --   --    Magnesium mg/dL  --  1.8 1.9 1.9   Phosphorus mg/dL  --  7.5* 8.4* 9.0*       Drug levels (last 3 results):  No results for input(s): "VANCOMYCINRA", "VANCORANDOM", "VANCOMYCINPE", "VANCOPEAK", "VANCOMYCINTR", "VANCOTROUGH" in the last 72 hours.    Microbiologic Results:  Microbiology Results (last 7 days)       Procedure Component Value Units Date/Time    Blood culture [[id number]] Collected: 03/04/24 0749    Order Status: Completed Specimen: Blood from Peripheral, Antecubital, Left Updated: 03/06/24 1212     Blood Culture, Routine No Growth to date      No Growth to date      No Growth to date    Blood culture [[id number]] Collected: 03/04/24 0749    Order Status: Completed Specimen: Blood from Peripheral, Antecubital, Left Updated: 03/06/24 1212     Blood Culture, Routine No Growth to date      No Growth to date      No Growth to date    Aerobic culture [[id number]] Collected: 03/04/24 1840    Order Status: Completed Specimen: Incision site from Abdomen Updated: 03/06/24 0854     Aerobic Bacterial Culture No growth    Narrative:      For dialysis nurse    Culture, Respiratory with Gram Stain [[id number]] Collected: 03/05/24 0950    Order Status: Completed Specimen: Respiratory from Sputum, Expectorated Updated: 03/05/24 1703     Gram Stain (Respiratory) <10 epithelial cells per low power field.     Gram Stain (Respiratory) No WBC's     Gram Stain (Respiratory) No organisms seen            "

## 2024-03-06 NOTE — ASSESSMENT & PLAN NOTE
With chest pain  Mild changes on 12 lead EKG  R/O ACS  Last 2D echo 3/1/24 Left Ventricle: The left ventricle is normal in size. There is concentric remodeling. Septal motion is consistent with pacing. There is reduced systolic function. Biplane (2D) method of discs ejection fraction is 52%.   consult cardiology - pain mostly pleuritic, no plan for invasive w/up while inpatient

## 2024-03-06 NOTE — ASSESSMENT & PLAN NOTE
Chronic, controlled. Latest blood pressure and vitals reviewed-     Temp:  [97.2 °F (36.2 °C)-98.4 °F (36.9 °C)]   Pulse:  [71-94]   Resp:  [16-20]   BP: (102-131)/(50-65)   SpO2:  [92 %-97 %] .   Home meds for hypertension were reviewed and noted below.   Hypertension Medications               carvediloL (COREG) 25 MG tablet TAKE 2 TABLETS (50 MG TOTAL) BY MOUTH 2 (TWO) TIMES DAILY.    furosemide (LASIX) 40 MG tablet Take 1 tablet (40 mg total) by mouth once daily.    hydrALAZINE (APRESOLINE) 100 MG tablet Take 1 tablet (100 mg total) by mouth 2 (two) times daily.    nitroGLYCERIN (NITROSTAT) 0.4 MG SL tablet Place 0.4 mg under the tongue every 5 (five) minutes as needed for Chest pain.     valsartan (DIOVAN) 160 MG tablet Take 160 mg by mouth once daily.            While in the hospital, will manage blood pressure as follows; Adjust home antihypertensive regimen as follows- holding all antihypertensives due to borderline low BP, restart as necessary    Will utilize p.r.n. blood pressure medication only if patient's blood pressure greater than 180/110 and she develops symptoms such as worsening chest pain or shortness of breath.

## 2024-03-06 NOTE — PROGRESS NOTES
Brief update note     RN reported worsening muscle twitching noted this afternoon.  Patient assessed at bedside.  Periodic involuntary movements of bilateral upper extremities noted.  Patient is sleepy, awakens to verbal commands and is oriented.  Complains of fatigue.  No culprit medications noted on the Mar.  Possible uremic twitching.  Plan for PD tonight.  VBG suggestive of hypercapnia, place on Bipap QHS. Evaluate for worsening infection. Monitor closely. Consider neurology consult.     Charlotte Villalobos MD   Internal Medicine Hospitalist

## 2024-03-06 NOTE — ASSESSMENT & PLAN NOTE
Suspected toxic/ metabolic etiology   2/2? Infectious vs other, ?uremia    Avoid sedating medications, add VBG/ ammonia levels

## 2024-03-06 NOTE — ASSESSMENT & PLAN NOTE
Creatine stable for now. BMP reviewed- noted Estimated Creatinine Clearance: 6.8 mL/min (A) (based on SCr of 5.6 mg/dL (H)). according to latest data. 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  Monitor UOP and serial BMP and adjust therapy as needed. Renally dose meds. Avoid nephrotoxic medications and procedures.

## 2024-03-06 NOTE — PLAN OF CARE
Patient received on   1 Lpm NC with SpO2    96%. Pt with no apparent distress noted. Will continue to monitor.

## 2024-03-06 NOTE — PLAN OF CARE
SW met with pt during rounding with MD. No dc today. SW will continue to follow pt throughout her transitions of care and assist with any dc needs.     Future Appointments   Date Time Provider Department Center   3/14/2024  9:30 AM CARRIE Arechiga MD [facility] [place]   3/15/2024 10:00 AM Ev Jiménez MD [facility] IMPR[facility]   4/10/2024  3:30 PM Kelvin Maki MD [facility] PULMSVC [facility]   4/17/2024  9:00 AM Eric Brooke PA-C [facility]   4/24/2024 10:40 AM Dayton Michael MD [facility] FAM MED [facility]   6/5/2024 12:15 PM EKG, APPT [facility] EKG [facility]   6/5/2024 12:40 PM COORDINATED DEVICE CHECK NOM ARRHPRO [facility]   6/5/2024  1:30 PM Celeste Rogers NP [facility] ARRHYTH [facility]        03/06/24 0945   Rounds   Attendance Provider;;Assigned nurse;Pharmacist   Discharge Plan A Home with family;Home Health   Why the patient remains in the hospital Requires continued medical care   Transition of Care Barriers None

## 2024-03-06 NOTE — ASSESSMENT & PLAN NOTE
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 Will replace calcium and monitor electrolytes closely. The latest calcium labs have been reviewed and are listed below.  Recent Labs   Lab 03/06/24  0314   CALCIUM 6.8*         No results for input(s): "CAION" in the last 24 hours.    Calcium supplement    "

## 2024-03-06 NOTE — ASSESSMENT & PLAN NOTE
Recently discharged from the hospital and readmitted with abnormal CXR  Continue zosyn, azithromycin  Respiratory gram stain - no organisms seen  Blood culture NGTD    Worsening left pleural effusion, consolidation. ?progressively worsening pneumonia, ?atypical vs fungal organism,?pleuroperitoneal leak possible in setting of PD    On chart review, prior h/o ABPA, ?MAC positive sputum cultures. Unclear if patient received complete treatment.     Add vancomycin to broaden antibiotic spectrum, order CT chest, pulmonary consult

## 2024-03-06 NOTE — PT/OT/SLP PROGRESS
"Occupational Therapy   Treatment    Name: Myesha Quinones  MRN: [id number]  Admitting Diagnosis:  Community acquired pneumonia      Recommendations:     Discharge Recommendations: Moderate Intensity Therapy  Discharge Equipment Recommendations:  none  Barriers to discharge:  Other (Comment), Decreased caregiver support (Increased level of assistance)    Assessment:     Myesha Quinones is a 83 y.o. female with a medical diagnosis of Community acquired pneumonia.   Performance deficits affecting function are weakness, impaired endurance, impaired self care skills, impaired functional mobility, gait instability, impaired balance, decreased upper extremity function, decreased lower extremity function, impaired coordination, impaired fine motor, impaired cardiopulmonary response to activity.    Pt found in bed, awake and reporting not feeling well enough to work with therapy.  Pt O x 3. Pt noted to have involuntary muscle twitching/mvmts throughout body at rest and with mvmt as well as pin point pupils. Pts son in law encouraging pt to participate with therapy.  Pt required increased assistance with therapy compared to yesterdays session. She was unable to ambulate and required Min A for sitting balance while sitting EOB. Nurse Eunice notified of changes noted and came to pts bedside.  Nurse reports she messaged pts medical team. Continue OT services to address functional goals, progressing as able.      Rehab Prognosis:  Good; patient would benefit from acute skilled OT services to address these deficits and reach maximum level of function.       Plan:     Patient to be seen 5 x/week to address the above listed problems via self-care/home management, therapeutic activities, therapeutic exercises  Plan of Care Expires: 04/04/24  Plan of Care Reviewed with: patient, other (see comments) (son in law)    Subjective     Chief Complaint: "I feel so tired."  Patient/Family Comments/goals: Pts son in law reports "I don't " "know what's going on with her."  Pain/Comfort:  Pain Rating 1:  (back and buttocks pain from lying in bed-repositioned to SL-unable to transition to chair-Nurse notified)    Objective:     Communicated with: RN prior to session.  Patient found HOB elevated with bed alarm, peripheral IV, oxygen upon OT entry to room.    General Precautions: Standard, fall, hearing impaired    Orthopedic Precautions:N/A  Braces: N/A  Respiratory Status: Nasal cannula     Occupational Performance:     Bed Mobility:    Patient completed Rolling/Turning to Right with minimum assistance  Patient completed Scooting/Bridging with maximal assistance and 2 persons  Patient completed Supine to Sit with moderate assistance  Patient completed Sit to Supine with moderate assistance     Functional Mobility/Transfers:  Patient completed Sit <> Stand Transfer with moderate assistance  with  no assistive device   Functional Mobility: Unable to take side steps      [facility] 6 Click ADL: 15    Treatment & Education:  Pt sat EOB with CGS/Min A for static sitting balance.  Pt leaning posteriorly and to R side.    Pt required Mod A to scoot seated to HOB.    Pt positioned on L side for comfort and pressure relief.      Patient left left sidelying with all lines intact, call button in reach, bed alarm on, nurse notified, and ANAID present    GOALS:   Multidisciplinary Problems       Occupational Therapy Goals          Problem: Occupational Therapy    Goal Priority Disciplines Outcome Interventions   Occupational Therapy Goal     OT, PT/OT Ongoing, Progressing    Description: Goals to be met by: 4/4/23     Patient will increase functional independence with ADLs by performing:    LE Dressing with Modified [place].  Grooming while standing with Modified [place].  Toileting from toilet with Modified [place] for hygiene and clothing management.   Supine to sit with Modified [place].  Step transfer with Modified [place]  Toilet transfer to " toilet with Modified [place].  Increased functional strength to WFL for self care skills and functional mobility.  Upper extremity exercise program x10 reps per handout, with independence.                         Time Tracking:     OT Date of Treatment: 03/06/24  OT Start Time: 1426  OT Stop Time: 1449  OT Total Time (min): 23 min    Billable Minutes:Therapeutic Activity 23            3/6/2024

## 2024-03-06 NOTE — PT/OT/SLP PROGRESS
Physical Therapy      Patient Name:  Myesha Quinones   MRN:  [id number]    Patient not seen today secondary to pt refusing participation with family in agreement stating pt has been having tests or procedures and has not slept.  Pt having difficulty maintaining alertness falling asleep midwjessy, mumbling. Will follow-up as able.

## 2024-03-06 NOTE — PLAN OF CARE
Problem: Adult Inpatient Plan of Care  Goal: Plan of Care Review  Outcome: Ongoing, Progressing     Problem: Adult Inpatient Plan of Care  Goal: Absence of Hospital-Acquired Illness or Injury  Outcome: Ongoing, Progressing     Problem: Adult Inpatient Plan of Care  Goal: Readiness for Transition of Care  Outcome: Ongoing, Progressing     Problem: Fluid Imbalance (Pneumonia)  Goal: Fluid Balance  Outcome: Ongoing, Progressing     Problem: Infection (Pneumonia)  Goal: Resolution of Infection Signs and Symptoms  Outcome: Ongoing, Progressing

## 2024-03-06 NOTE — SUBJECTIVE & OBJECTIVE
Interval History: c/o poor sleep last night due to uncomfortable positioning. C/o feeling tired and sleepy this morning. Received hydroxyzine last night for anxiety. Family present at bedside.     Review of Systems  Objective:     Vital Signs (Most Recent):  Temp: 98.3 °F (36.8 °C) (03/06/24 1120)  Pulse: 82 (03/06/24 1325)  Resp: 20 (03/06/24 1325)  BP: (!) 127/59 (03/06/24 1120)  SpO2: (!) 92 % (03/06/24 1325) Vital Signs (24h Range):  Temp:  [97.2 °F (36.2 °C)-98.4 °F (36.9 °C)] 98.3 °F (36.8 °C)  Pulse:  [71-94] 82  Resp:  [16-20] 20  SpO2:  [92 %-97 %] 92 %  BP: (102-131)/(50-65) 127/59     Weight: 65.8 kg (145 lb 1 oz)  Body mass index is 25.7 kg/m².  No intake or output data in the 24 hours ending 03/06/24 1442        Physical Exam  Vitals and nursing note reviewed.   Constitutional:       General: She is not in acute distress.     Comments: Sleepy, elderly tired appearing female   Cardiovascular:      Rate and Rhythm: Normal rate and regular rhythm.      Heart sounds: Normal heart sounds. No murmur heard.  Pulmonary:      Effort: Pulmonary effort is normal. No respiratory distress.      Breath sounds: No wheezing.   Abdominal:      Palpations: Abdomen is soft.      Tenderness: There is no abdominal tenderness.      Comments: PD catheter in place   Musculoskeletal:      Right lower leg: No edema.      Left lower leg: No edema.   Skin:     General: Skin is warm and dry.      Findings: No bruising.   Neurological:      General: No focal deficit present.             Significant Labs: All pertinent labs within the past 24 hours have been reviewed.    Significant Imaging: I have reviewed all pertinent imaging results/findings within the past 24 hours.

## 2024-03-06 NOTE — PROGRESS NOTES
Patient seen.  Labs and vitals reviewed.  /50.  Pulse 80.  Pulse oximeter 94 % O2.  K 3.7. Replace prn.  Peritoneal dialysis/  cc.  Continue PD.

## 2024-03-06 NOTE — CONSULTS
"[facility]  ICU Note       Admit Date: 3/4/2024   LOS: 1 day     Chief Complaint/Reason for Admission:  83-year-old woman with ESRD on PD, CAD, combined systolic and diastolic heart failure, LBBB, biventricular ICD, CKD4, IDDM, breast cancer, HTN, HLD, severe persistent asthma, HLD presented to [facility] for CP, currently being admitted for HCAP & ACS rule-out. Pulmonology is being consulted for CXR findings that seem to be worsening.    Of note, patient was admitted to [facility] 2/29 and discharged 3/2 for management of metabolic encephalopathy with presumed infectious etiology. Work-up at that time resulted in negative blood cultures, normal UA, non- infectious PD fluid, negative COVID and flu. Chest x-ray showed interstitial opacification which could be consistent with pulmonary edema versus infection. She was treated empirically for pneumonia with which improved her mentation "greatly" and was discharged with 2-day course of Augmentin.      PMHx  Past Medical History:   Diagnosis Date    Acute encephalopathy 2/29/2024    Acute hypoxemic respiratory failure 12/19/2019    Acute right-sided thoracic back pain 12/09/2019    Allergy     Asthma     Basal cell carcinoma     left forehead    Basal cell carcinoma     left nose    Basal cell carcinoma 05/20/2015    right nose    Basal cell carcinoma 12/22/2015    left lower post neck    Basal cell carcinoma 12/03/2019    left ant scalp     BCC (basal cell carcinoma of skin) 10/20/2022    Right alar groove    Bilateral pleural effusion     Bilateral renal cysts     Breast cancer     CAD (coronary artery disease)     Cardiomyopathy     Cardiomyopathy, ischemic     Cataract     Chest pain 3/4/2024    CHF (congestive heart failure)     CKD (chronic kidney disease) stage 4, GFR 15-29 ml/min     Colon polyp 2011    Controlled type 2 diabetes mellitus 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 and macular edema, with long-term " current use of insulin 02/22/2018    COPD (chronic obstructive pulmonary disease)     COPD exacerbation 04/08/2018    Current mild episode of major depressive disorder without prior episode 01/25/2022    Defibrillator discharge     Dependence on renal dialysis 08/22/2023    Diabetes mellitus     Diabetes mellitus type II     Diabetes with neurologic complications     Edema     ESRD needing dialysis     Goiter     MNG    Hematuria, unspecified     HX: breast cancer     Hyperlipidemia     Hypertension     Hypocalcemia     Hypokalemia     Hyponatremia     Hyponatremia 04/02/2022    Iron deficiency anemia 05/16/2017    Iron deficiency anemia     Iron deficiency anemia     Left kidney mass     Meningioma     Microalbuminuria due to type 2 diabetes mellitus 01/26/2022    Osteoporosis, postmenopausal     Pneumonia 12/08/2019    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    Proteinuria 01/21/2019    Pseudomonas pneumonia     SCC (squamous cell carcinoma) 08/25/2022    right posterior neck    Skin cancer     s/p excision    Sleep apnea     CPAP    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/2015    mid forehead    Unspecified vitamin D deficiency     UTI (urinary tract infection) 04/02/2022    Ventricular tachycardia     Vitamin B12 deficiency     Vitamin D deficiency disease         PSHx  Past Surgical History:   Procedure Laterality Date    BASAL CELL CARCINOMA EXCISION      posterior neck and nose    BREAST BIOPSY      BREAST CYST EXCISION Left     BREAST SURGERY      CARDIAC DEFIBRILLATOR PLACEMENT      x 2    CATARACT EXTRACTION W/  INTRAOCULAR LENS IMPLANT Bilateral     CHOLECYSTECTOMY      COLONOSCOPY N/A 11/5/2019    Procedure: COLONOSCOPY;  Surgeon: Boaz Botello MD;  Location: [facility] ([address] [place] [address]);  Service: Endoscopy;  Laterality: N/A;  AICD - [employer] -     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    FRACTURE SURGERY      left elbow and wrist as a child    HYSTERECTOMY      INSERTION, CATHETER, DIALYSIS, PERITONEAL,  LAPAROSCOPIC N/A 4/25/2023    Procedure: INSERTION, CATHETER, DIALYSIS, PERITONEAL, LAPAROSCOPIC;  Surgeon: Marcelo Isaac MD;  Location: [facility] OR;  Service: General;  Laterality: N/A;    LAPAROSCOPIC LYSIS OF ADHESIONS N/A 4/25/2023    Procedure: LYSIS, ADHESIONS, LAPAROSCOPIC;  Surgeon: Marcelo Isaac MD;  Location: [facility] OR;  Service: General;  Laterality: N/A;    MASTECTOMY Right     OMENTOPEXY, LAPAROSCOPIC N/A 4/25/2023    Procedure: OMENTOPEXY, LAPAROSCOPIC;  Surgeon: Marcelo Isaac MD;  Location: [facility] OR;  Service: General;  Laterality: N/A;    REPLACEMENT OF IMPLANTABLE CARDIOVERTER-DEFIBRILLATOR (ICD) GENERATOR N/A 12/17/2018    Procedure: REPLACEMENT, ICD GENERATOR;  Surgeon: Jan Mckeon MD;  Location: [facility] EP LAB;  Service: Cardiology;  Laterality: N/A;  DONNA, CRT-D gen change, MDT, MAC, SK, 3 Prep    REVISION OF SKIN POCKET FOR CARDIOVERTER-DEFIBRILLATOR  12/17/2018    Procedure: Revision, Skin Pocket, For Cardioverter-Defibrillator;  Surgeon: Jan Mckeon MD;  Location: [facility] EP LAB;  Service: Cardiology;;    SQUAMOUS CELL CARCINOMA EXCISION      remved from forehead    TONSILLECTOMY         Family History  Family History   Problem Relation Age of Onset    Asthma Mother     Hypertension Mother     Stroke Mother     Diabetes Father     Cardiomyopathy Father     Diabetes Sister     Heart disease Sister     Heart attack Sister     Heart attack Brother     Diabetes Brother     Heart disease Brother     Hypertension Brother     Diabetes Brother     Heart disease Brother     Hypertension Brother     Cerebral aneurysm Brother     Diabetes Brother     Heart disease Brother     Cancer Brother         colon    Diabetes Brother     Diabetes Daughter         prediabetes    Cancer Daughter         melanoma    Obesity Daughter     Melanoma Daughter     Cancer Son         skin    Diabetes Son         prediabetes    Diabetes Son     No Known Problems Maternal Grandmother     No Known Problems Maternal  Grandfather     No Known Problems Paternal Grandmother     No Known Problems Paternal Grandfather     Amblyopia Neg Hx     Blindness Neg Hx     Cataracts Neg Hx     Glaucoma Neg Hx     Macular degeneration Neg Hx     Retinal detachment Neg Hx     Strabismus Neg Hx     Thyroid disease Neg Hx        Social  Social History     Socioeconomic History    Marital status:    Occupational History    Occupation: Homemaker     Employer: OTHER   Tobacco Use    Smoking status: Never     Passive exposure: Never    Smokeless tobacco: Never   Substance and Sexual Activity    Alcohol use: No     Alcohol/week: 0.0 standard drinks of alcohol    Drug use: No    Sexual activity: Yes     Partners: Male   Other Topics Concern    Are you pregnant or think you may be? No    Breast-feeding No     Social Determinants of Health     Financial Resource Strain: Low Risk  (8/22/2023)    Overall Financial Resource Strain (CARDIA)     Difficulty of Paying Living Expenses: Not hard at all   Food Insecurity: No Food Insecurity (8/22/2023)    Hunger Vital Sign     Worried About Running Out of Food in the Last Year: Never true     Ran Out of Food in the Last Year: Never true   Transportation Needs: No Transportation Needs (8/22/2023)    PRAPARE - Transportation     Lack of Transportation (Medical): No     Lack of Transportation (Non-Medical): No   Physical Activity: Inactive (8/22/2023)    Exercise Vital Sign     Days of Exercise per Week: 0 days     Minutes of Exercise per Session: 0 min   Stress: No Stress Concern Present (8/22/2023)    [place] [employer] - Occupational Stress Questionnaire     Feeling of Stress : Only a little   Social Connections: Moderately Isolated (8/22/2023)    Social Connection and Isolation Panel [NHANES]     Frequency of Communication with Friends and Family: More than three times a week     Frequency of Social Gatherings with Friends and Family: More than three times a week     Attends Yarsanism  Services: More than 4 times per year     Active Member of Clubs or Organizations: No     Attends Club or Organization Meetings: Never     Marital Status:    Housing Stability: Low Risk  (8/22/2023)    Housing Stability Vital Sign     Unable to Pay for Housing in the Last Year: No     Number of Places Lived in the Last Year: 1     Unstable Housing in the Last Year: No       Allergies  Review of patient's allergies indicates:   Allergen Reactions    Iodine and iodide containing products Hives and Other (See Comments)     Not specified     Nifedipine      weakness       ROS- As per HPI, otherwise negative  Review of Systems   Constitutional:  Negative for chills and fever.   Respiratory:  Negative for cough and shortness of breath.    Cardiovascular:  Negative for chest pain and leg swelling.   Gastrointestinal:  Negative for abdominal pain, nausea and vomiting.   Musculoskeletal:  Positive for neck pain.       Objective  Vitals:    03/06/24 0814 03/06/24 1120 03/06/24 1232 03/06/24 1325   BP: (!) 102/50 (!) 127/59     BP Location: Left arm Left arm     Patient Position: Lying Lying     Pulse: 80 90 94 82   Resp: 17 18  20   Temp: 98.4 °F (36.9 °C) 98.3 °F (36.8 °C)     TempSrc: Oral Oral     SpO2: (!) 94% (!) 93%  (!) 92%   Weight:       Height:         Vitals(Most Recent)      Temp: 98.3 °F (36.8 °C) (03/06/24 1120)  Pulse: 82 (03/06/24 1325)  Resp: 20 (03/06/24 1325)  BP: (!) 127/59 (03/06/24 1120)  SpO2: (!) 92 % (03/06/24 1325)           Vitals Range  Temp:  [97.2 °F (36.2 °C)-98.4 °F (36.9 °C)]   Pulse:  [71-94]   Resp:  [16-20]   BP: (102-131)/(50-65)   SpO2:  [92 %-97 %]     Physical Exam  Physical Exam  Constitutional:       General: She is not in acute distress.     Appearance: She is not ill-appearing.   Eyes:      General:         Right eye: No discharge.         Left eye: No discharge.      Conjunctiva/sclera: Conjunctivae normal.   Cardiovascular:      Rate and Rhythm: Normal rate.      Comments:  Heart sounds distant  Pulmonary:      Effort: Pulmonary effort is normal. No respiratory distress.      Breath sounds: Normal breath sounds. No wheezing or rales.      Comments: On 2L NC  Abdominal:      General: There is distension.      Tenderness: There is no abdominal tenderness. There is no guarding or rebound.   Musculoskeletal:      Right lower leg: No edema.      Left lower leg: No edema.   Skin:     General: Skin is warm.      Coloration: Skin is not jaundiced.      Findings: Bruising present.   Neurological:      Comments: Sporadic whole body twitching         Labs  [unfilled]  Lab Results   Component Value Date    INR 1.9 (H) 02/29/2024    INR 1.2 02/29/2024    INR 1.0 08/31/2022    APTT 35.2 (H) 06/21/2022    APTT 37.9 (H) 12/07/2020    APTT 27.1 12/10/2018     Recent Labs     03/04/24  1421   TROPONINI 0.041*       Imaging  Imaging Results              X-Ray Chest AP Portable (Final result)  Result time 03/04/24 05:37:07      Final result by Kam Krishnamurthy MD (03/04/24 05:37:07)                   Impression:      Progression of radiographic findings, as discussed above.      Electronically signed by: Kam Krishnamurthy  Date:    03/04/2024  Time:    05:37               Narrative:    EXAMINATION:  XR CHEST AP PORTABLE    CLINICAL HISTORY:  Chest Pain;    TECHNIQUE:  Single frontal view of the chest was performed.    COMPARISON:  Chest radiograph February 29, 2024    FINDINGS:  Single portable chest view is submitted.  Cardiac pacemaker is noted.  When accounting for position and technique and depth of inspiration the cardiomediastinal silhouette is thought stable.  Aortic atherosclerotic change noted.    There is prominence of the pulmonary vascular.  There is bilateral pattern of interstitial and patchy alveolar infiltrates.  There is progression of the aforementioned when compared to the prior study.    There is opacity at the left lung base, this likely relates to pleural effusion, greater opacity  "peripherally on the left may relate to increasing pleural fluid and or peripheral pulmonary infiltrate/airspace disease.    There is no significant pleural effusion on the right and there is no pneumothorax.    The osseous structures demonstrate chronic change.                                        Assessment/Plan:  Myesha Quinones is a 83 y.o. female patient w/ pertinent medical history of asthma, COPD, Chronic pleural effusions, Multiple lung nodules, Combined HF, Allergic Bronchopulmonary Aspergillosis, Mycobacterium avium complex     #Hypoxia  #Pleural Effusion  Patient presented with oxygen saturations in low 90s, requiring 2L NC . She has chronic left pleural effusion from 12/17/2018. Underwent thoracentesis on left in 7/2022. Results were exudative, but negative cytology. Pulmonologist (Dr. Kelvin Maki) believes it to be related to her heart failure.  CT (3/6/24): Large left pleural effusion with associated compressive atelectasis. Aerated portion of the left lung demonstrates scattered heterogeneous airspace opacities. Additionally, the right lung demonstrates mild diffuse scattered patchy heterogeneous airspace opacities with basilar predominance. Trace right pleural effusion with associated compressive atelectasis.  CXR 2/29 (previous encounter): Bilateral interstitial opacities, somewhat worsened from prior.  Findings are concerning for mild interstitial pulmonary edema with background chronic changes. Small left pleural effusion.  CXR 3/4/24: "There is bilateral pattern of interstitial and patchy alveolar infiltrates.  There is progression of the aforementioned when compared to the prior study. There is opacity at the left lung base, this likely relates to pleural effusion, greater opacity peripherally on the left may relate to increasing pleural fluid and or peripheral pulmonary infiltrate/airspace disease. There is no significant pleural effusion on the right and there is no pneumothorax."  CXR " 3/6/24: Moderate LEFT pleural effusion with consolidative change LEFT lung base with underlying pulmonary edema   Differentials considered:  - Worsening heart failure: Similar EF, loculated pockets on bedside echo, no crackles on auscultation, has gotten lasix last two days (with minimal output), net negative, worse imaging doesn't make sense  - Lung nodules compression causing pleural effusion  - PD leak  Thoracentesis performed at bedside. Will send off for cytology  If cytology warrants, will discontinue antibiotics  Discussed the need for daily PD with family; will follow up further  Continue Lasix 60mg Q12 hrs      Micro  Microbiology Results (last 7 days)       Procedure Component Value Units Date/Time    Blood culture [[id number]] Collected: 03/04/24 0749    Order Status: Completed Specimen: Blood from Peripheral, Antecubital, Left Updated: 03/06/24 1212     Blood Culture, Routine No Growth to date      No Growth to date      No Growth to date    Blood culture [[id number]] Collected: 03/04/24 0749    Order Status: Completed Specimen: Blood from Peripheral, Antecubital, Left Updated: 03/06/24 1212     Blood Culture, Routine No Growth to date      No Growth to date      No Growth to date    Aerobic culture [[id number]] Collected: 03/04/24 1840    Order Status: Completed Specimen: Incision site from Abdomen Updated: 03/06/24 0854     Aerobic Bacterial Culture No growth    Narrative:      For dialysis nurse    Culture, Respiratory with Gram Stain [[id number]] Collected: 03/05/24 0950    Order Status: Completed Specimen: Respiratory from Sputum, Expectorated Updated: 03/05/24 1703     Gram Stain (Respiratory) <10 epithelial cells per low power field.     Gram Stain (Respiratory) No WBC's     Gram Stain (Respiratory) No organisms seen               3/6/2024 Bianca Miramontes M.D., HO-I  [facility]  [facility]

## 2024-03-07 PROBLEM — J90 PLEURAL EFFUSION ON LEFT: Status: ACTIVE | Noted: 2024-03-07

## 2024-03-07 LAB
ACID FAST MOD KINY STN SPEC: NORMAL
ALBUMIN FLD-MCNC: 1.4 G/DL
AMYLASE, BODY FLUID: 20 U/L
ANION GAP SERPL CALC-SCNC: 15 MMOL/L (ref 8–16)
BASOPHILS # BLD AUTO: 0.03 K/UL (ref 0–0.2)
BASOPHILS NFR BLD: 0.3 % (ref 0–1.9)
BODY FLUID SOURCE AMYLASE: NORMAL
BODY FLUID SOURCE, LDH: NORMAL
BUN SERPL-MCNC: 68 MG/DL (ref 8–23)
CALCIUM SERPL-MCNC: 6.5 MG/DL (ref 8.7–10.5)
CHLORIDE SERPL-SCNC: 95 MMOL/L (ref 95–110)
CO2 SERPL-SCNC: 24 MMOL/L (ref 23–29)
CREAT SERPL-MCNC: 5.7 MG/DL (ref 0.5–1.4)
DIFFERENTIAL METHOD BLD: ABNORMAL
EOSINOPHIL # BLD AUTO: 0.1 K/UL (ref 0–0.5)
EOSINOPHIL NFR BLD: 1.1 % (ref 0–8)
ERYTHROCYTE [DISTWIDTH] IN BLOOD BY AUTOMATED COUNT: 13.8 % (ref 11.5–14.5)
EST. GFR  (NO RACE VARIABLE): 7 ML/MIN/1.73 M^2
GLUCOSE FLD-MCNC: 95 MG/DL
GLUCOSE SERPL-MCNC: 122 MG/DL (ref 70–110)
HCT VFR BLD AUTO: 21.9 % (ref 37–48.5)
HGB BLD-MCNC: 7.2 G/DL (ref 12–16)
IMM GRANULOCYTES # BLD AUTO: 0.14 K/UL (ref 0–0.04)
IMM GRANULOCYTES NFR BLD AUTO: 1.2 % (ref 0–0.5)
KOH PREP SPEC: NORMAL
LDH FLD L TO P-CCNC: 620 U/L
LYMPHOCYTES # BLD AUTO: 1 K/UL (ref 1–4.8)
LYMPHOCYTES NFR BLD: 9.1 % (ref 18–48)
MAGNESIUM SERPL-MCNC: 1.8 MG/DL (ref 1.6–2.6)
MCH RBC QN AUTO: 30.6 PG (ref 27–31)
MCHC RBC AUTO-ENTMCNC: 32.9 G/DL (ref 32–36)
MCV RBC AUTO: 93 FL (ref 82–98)
MONOCYTES # BLD AUTO: 0.9 K/UL (ref 0.3–1)
MONOCYTES NFR BLD: 7.6 % (ref 4–15)
NEUTROPHILS # BLD AUTO: 9.2 K/UL (ref 1.8–7.7)
NEUTROPHILS NFR BLD: 80.7 % (ref 38–73)
NRBC BLD-RTO: 0 /100 WBC
PHOSPHATE SERPL-MCNC: 8.8 MG/DL (ref 2.7–4.5)
PLATELET # BLD AUTO: 203 K/UL (ref 150–450)
PMV BLD AUTO: 10.6 FL (ref 9.2–12.9)
POCT GLUCOSE: 105 MG/DL (ref 70–110)
POCT GLUCOSE: 111 MG/DL (ref 70–110)
POCT GLUCOSE: 115 MG/DL (ref 70–110)
POCT GLUCOSE: 209 MG/DL (ref 70–110)
POTASSIUM SERPL-SCNC: 3.4 MMOL/L (ref 3.5–5.1)
PROT FLD-MCNC: 3.6 G/DL
RBC # BLD AUTO: 2.35 M/UL (ref 4–5.4)
SODIUM SERPL-SCNC: 134 MMOL/L (ref 136–145)
SPECIMEN SOURCE: NORMAL
WBC # BLD AUTO: 11.38 K/UL (ref 3.9–12.7)

## 2024-03-07 PROCEDURE — 97530 THERAPEUTIC ACTIVITIES: CPT | Mod: CO

## 2024-03-07 PROCEDURE — 80048 BASIC METABOLIC PNL TOTAL CA: CPT | Performed by: FAMILY MEDICINE

## 2024-03-07 PROCEDURE — 11000001 HC ACUTE MED/SURG PRIVATE ROOM

## 2024-03-07 PROCEDURE — 63600175 PHARM REV CODE 636 W HCPCS: Performed by: HOSPITALIST

## 2024-03-07 PROCEDURE — 94761 N-INVAS EAR/PLS OXIMETRY MLT: CPT

## 2024-03-07 PROCEDURE — 25000003 PHARM REV CODE 250: Performed by: HOSPITALIST

## 2024-03-07 PROCEDURE — 83735 ASSAY OF MAGNESIUM: CPT | Performed by: FAMILY MEDICINE

## 2024-03-07 PROCEDURE — 27000221 HC OXYGEN, UP TO 24 HOURS

## 2024-03-07 PROCEDURE — 85025 COMPLETE CBC W/AUTO DIFF WBC: CPT | Performed by: FAMILY MEDICINE

## 2024-03-07 PROCEDURE — 36415 COLL VENOUS BLD VENIPUNCTURE: CPT | Performed by: FAMILY MEDICINE

## 2024-03-07 PROCEDURE — 94799 UNLISTED PULMONARY SVC/PX: CPT | Mod: XB

## 2024-03-07 PROCEDURE — 99900035 HC TECH TIME PER 15 MIN (STAT)

## 2024-03-07 PROCEDURE — 25000003 PHARM REV CODE 250: Performed by: INTERNAL MEDICINE

## 2024-03-07 PROCEDURE — 84100 ASSAY OF PHOSPHORUS: CPT | Performed by: FAMILY MEDICINE

## 2024-03-07 PROCEDURE — 63600175 PHARM REV CODE 636 W HCPCS: Performed by: FAMILY MEDICINE

## 2024-03-07 PROCEDURE — 25000003 PHARM REV CODE 250: Performed by: STUDENT IN AN ORGANIZED HEALTH CARE EDUCATION/TRAINING PROGRAM

## 2024-03-07 PROCEDURE — 25000003 PHARM REV CODE 250: Performed by: FAMILY MEDICINE

## 2024-03-07 PROCEDURE — 63700000 PHARM REV CODE 250 ALT 637 W/O HCPCS: Performed by: HOSPITALIST

## 2024-03-07 PROCEDURE — 63600175 PHARM REV CODE 636 W HCPCS

## 2024-03-07 RX ORDER — LACTULOSE 10 G/15ML
10 SOLUTION ORAL 3 TIMES DAILY
Status: DISCONTINUED | OUTPATIENT
Start: 2024-03-07 | End: 2024-03-08

## 2024-03-07 RX ORDER — SEVELAMER CARBONATE 800 MG/1
1600 TABLET, FILM COATED ORAL
Status: DISCONTINUED | OUTPATIENT
Start: 2024-03-07 | End: 2024-03-11 | Stop reason: HOSPADM

## 2024-03-07 RX ORDER — POTASSIUM CHLORIDE 750 MG/1
30 TABLET, EXTENDED RELEASE ORAL ONCE
Status: COMPLETED | OUTPATIENT
Start: 2024-03-07 | End: 2024-03-07

## 2024-03-07 RX ADMIN — POTASSIUM CHLORIDE 30 MEQ: 750 TABLET, EXTENDED RELEASE ORAL at 09:03

## 2024-03-07 RX ADMIN — FUROSEMIDE 60 MG: 10 INJECTION, SOLUTION INTRAMUSCULAR; INTRAVENOUS at 10:03

## 2024-03-07 RX ADMIN — CARVEDILOL 3.12 MG: 3.12 TABLET, FILM COATED ORAL at 08:03

## 2024-03-07 RX ADMIN — MUPIROCIN: 20 OINTMENT TOPICAL at 08:03

## 2024-03-07 RX ADMIN — SEVELAMER CARBONATE 1600 MG: 800 TABLET, FILM COATED ORAL at 05:03

## 2024-03-07 RX ADMIN — INSULIN DETEMIR 8 UNITS: 100 INJECTION, SOLUTION SUBCUTANEOUS at 09:03

## 2024-03-07 RX ADMIN — SEVELAMER CARBONATE 1600 MG: 800 TABLET, FILM COATED ORAL at 12:03

## 2024-03-07 RX ADMIN — GUAIFENESIN 600 MG: 600 TABLET, EXTENDED RELEASE ORAL at 09:03

## 2024-03-07 RX ADMIN — GUAIFENESIN 600 MG: 600 TABLET, EXTENDED RELEASE ORAL at 08:03

## 2024-03-07 RX ADMIN — SODIUM BICARBONATE 650 MG: 650 TABLET ORAL at 12:03

## 2024-03-07 RX ADMIN — HEPARIN SODIUM 5000 UNITS: 5000 INJECTION INTRAVENOUS; SUBCUTANEOUS at 02:03

## 2024-03-07 RX ADMIN — FUROSEMIDE 60 MG: 10 INJECTION, SOLUTION INTRAMUSCULAR; INTRAVENOUS at 09:03

## 2024-03-07 RX ADMIN — PIPERACILLIN AND TAZOBACTAM 4.5 G: 4; .5 INJECTION, POWDER, LYOPHILIZED, FOR SOLUTION INTRAVENOUS; PARENTERAL at 08:03

## 2024-03-07 RX ADMIN — MUPIROCIN: 20 OINTMENT TOPICAL at 10:03

## 2024-03-07 RX ADMIN — INSULIN ASPART 1 UNITS: 100 INJECTION, SOLUTION INTRAVENOUS; SUBCUTANEOUS at 09:03

## 2024-03-07 RX ADMIN — LIDOCAINE 5% 1 PATCH: 700 PATCH TOPICAL at 10:03

## 2024-03-07 RX ADMIN — CARVEDILOL 3.12 MG: 3.12 TABLET, FILM COATED ORAL at 09:03

## 2024-03-07 RX ADMIN — Medication 100 MCG: at 09:03

## 2024-03-07 RX ADMIN — PRAVASTATIN SODIUM 40 MG: 40 TABLET ORAL at 09:03

## 2024-03-07 RX ADMIN — PIPERACILLIN AND TAZOBACTAM 4.5 G: 4; .5 INJECTION, POWDER, LYOPHILIZED, FOR SOLUTION INTRAVENOUS; PARENTERAL at 09:03

## 2024-03-07 RX ADMIN — AZITHROMYCIN DIHYDRATE 500 MG: 250 TABLET ORAL at 09:03

## 2024-03-07 RX ADMIN — CHOLECALCIFEROL TAB 25 MCG (1000 UNIT) 2000 UNITS: 25 TAB at 09:03

## 2024-03-07 RX ADMIN — HEPARIN SODIUM 5000 UNITS: 5000 INJECTION INTRAVENOUS; SUBCUTANEOUS at 09:03

## 2024-03-07 RX ADMIN — POTASSIUM BICARBONATE 25 MEQ: 978 TABLET, EFFERVESCENT ORAL at 10:03

## 2024-03-07 RX ADMIN — SEVELAMER CARBONATE 1600 MG: 800 TABLET, FILM COATED ORAL at 08:03

## 2024-03-07 RX ADMIN — ASPIRIN 81 MG CHEWABLE TABLET 81 MG: 81 TABLET CHEWABLE at 09:03

## 2024-03-07 RX ADMIN — LACTULOSE 10 G: 20 SOLUTION ORAL at 02:03

## 2024-03-07 RX ADMIN — SEVELAMER CARBONATE 1600 MG: 800 TABLET, FILM COATED ORAL at 09:03

## 2024-03-07 RX ADMIN — SODIUM BICARBONATE 650 MG: 650 TABLET ORAL at 08:03

## 2024-03-07 RX ADMIN — HEPARIN SODIUM 5000 UNITS: 5000 INJECTION INTRAVENOUS; SUBCUTANEOUS at 05:03

## 2024-03-07 RX ADMIN — SODIUM BICARBONATE 650 MG: 650 TABLET ORAL at 09:03

## 2024-03-07 RX ADMIN — SODIUM BICARBONATE 650 MG: 650 TABLET ORAL at 05:03

## 2024-03-07 NOTE — PROGRESS NOTES
Patient seen.   Labs and vitals reviewed.   Peritoneal dialysis / total  cc.   /56.   Pulse 80.  Continue PD.

## 2024-03-07 NOTE — ASSESSMENT & PLAN NOTE
Unclear etiology, possible in setting of infection   Repeat LFTs, consider further testing if LFT's continue to worsen

## 2024-03-07 NOTE — PT/OT/SLP PROGRESS
"Occupational Therapy   Treatment    Name: Myesha Quinones  MRN: [id number]  Admitting Diagnosis:  Community acquired pneumonia       Recommendations:     Discharge Recommendations: Moderate Intensity Therapy  Discharge Equipment Recommendations:  none  Barriers to discharge:  Decreased caregiver support, Other (Comment) (Increased level of assistance)    Assessment:     Myesha Quinones is a 83 y.o. female with a medical diagnosis of Community acquired pneumonia.  Performance deficits affecting function are weakness, impaired endurance, impaired self care skills, impaired functional mobility, gait instability, impaired balance, decreased upper extremity function, decreased lower extremity function, decreased safety awareness, pain, impaired coordination, impaired fine motor, impaired skin, edema, impaired cardiopulmonary response to activity.    Pt found in bed, agreeable to therapy.  Pt with improved alertness. She continues with twitching however less and noted in UEs only this session. Pt was able to transition OOB to chair.  Pt is progressing towards goals. Continue OT services to address functional goals, progressing as able.      Rehab Prognosis:  Good; patient would benefit from acute skilled OT services to address these deficits and reach maximum level of function.       Plan:     Patient to be seen 5 x/week to address the above listed problems via self-care/home management, therapeutic activities, therapeutic exercises  Plan of Care Expires: 04/04/24  Plan of Care Reviewed with: patient    Subjective     Chief Complaint: "I'm still weak but feel better."  Patient/Family Comments/goals: to return to PLOF  Pain/Comfort:  Pain Rating 1:  (back pain from bed-reposition)    Objective:     Communicated with: RN prior to session.  Patient found HOB elevated with bed alarm, peripheral IV, telemetry, oxygen, lauren catheter upon OT entry to room.    General Precautions: Standard, fall, hearing impaired  "   Orthopedic Precautions:N/A  Braces: N/A  Respiratory Status: Nasal cannula     Occupational Performance:     Bed Mobility:    Patient completed Rolling/Turning to Left with  stand by assistance and with side rail  Patient completed Rolling/Turning to Right with stand by assistance and with side rail  Patient completed Supine to Sit with minimum assistance HOB elevated, increased time and effort, vc's for effective technique    Functional Mobility/Transfers:  Patient completed Sit <> Stand Transfer with minimum assistance  with  no assistive device   Patient completed Bed <> Chair Transfer using Stand Pivot technique with minimum assistance with no assistive device  Functional Mobility: Pt took 2-3 turning  steps with Min A w/o AD when transferring to chair.     Activities of Daily Living:  Toileting: total assistance pt soiled      AMPAC 6 Click ADL: 15    Treatment & Education:  Pt sat EOB with SBA/CGA.    Encouraged OOB in chair 1-2 hours and to call for assistance for back to bed. Pt verbalizes understanding.    Pt setup with lunch tray.     Patient left up in chair with all lines intact, call button in reach, nurse notified, and son in law present    GOALS:   Multidisciplinary Problems       Occupational Therapy Goals          Problem: Occupational Therapy    Goal Priority Disciplines Outcome Interventions   Occupational Therapy Goal     OT, PT/OT Ongoing, Progressing    Description: Goals to be met by: 4/4/23     Patient will increase functional independence with ADLs by performing:    LE Dressing with Modified [place].  Grooming while standing with Modified [place].  Toileting from toilet with Modified [place] for hygiene and clothing management.   Supine to sit with Modified [place].  Step transfer with Modified [place]  Toilet transfer to toilet with Modified [place].  Increased functional strength to WFL for self care skills and functional mobility.  Upper extremity exercise  program x10 reps per handout, with independence.                         Time Tracking:     OT Date of Treatment: 03/07/24  OT Start Time: 1123  OT Stop Time: 1148  OT Total Time (min): 25 min    Billable Minutes:Therapeutic Activity 25          3/7/2024

## 2024-03-07 NOTE — PROGRESS NOTES
03/07/24 0936        Peritoneal Dialysis Catheter 04/25/23 0930 Left lower abdomen   Placement Date/Time: 04/25/23 0930   Present Prior to Hospital Arrival?: No  Inserted by: MD  Catheter Location: Left lower abdomen   Site Assessment Clean;Dry;Intact;No redness;No swelling   Dressing Intervention Integrity maintained   Status Deaccessed   Dressing Status Clean;Dry;Intact   Dressing Gauze   Securement secured to abdomen with tape   Clamp Status clamped   Peritoneal Dialysis   Exchange Type Cycler   Peritoneal Treatment Status Completed   Cycler Peritoneal Dialysis   Initial Drain Volume (mL) 11 mL   Effluent Appearance Yellow   Average Dwell Time (specify hr/min) 1/32   Cycler PD Total UF (mL) 734 mL   Cycler Treatment Comments Patient disconnected per policy. patient stated no complaint with therapy.

## 2024-03-07 NOTE — NURSING
Physicians at bedside new order for bladder scan. Scan done with 343 cc urine noted. Verbal order for catheter to remove. Cloudy yellow urine noted out. Consents signed with daughter for thoracentesis. In progress at this time.

## 2024-03-07 NOTE — PLAN OF CARE
Patient received on   1 Lpm NC with SpO2    98%. Pt with no apparent distress noted. Will continue to monitor.

## 2024-03-07 NOTE — NURSING
RAPID RESPONSE NURSE PROACTIVE ROUNDING NOTE       Time of Visit: 730    Admit Date: 3/4/2024  LOS: 2  Code Status: Full Code   Date of Visit: 2024  : 1940  Age: 83 y.o.  Sex: female  Race: White  Bed: K477/K477 A:   MRN: [id number]  Was the patient discharged from an ICU this admission? No   Was the patient discharged from a PACU within last 24 hours? No   Did the patient receive conscious sedation/general anesthesia in last 24 hours? No   Was the patient in the ED within the past 24 hours? No   Was the patient on NIPPV within the past 24 hours? No   Attending Physician: Charlotte Villalobos MD  Primary Service: Hospitalist,Internal Medicine   Time spent at the bedside: < 15 min    SITUATION    Notified by Epic patient alert  Reason for alert: CT    Diagnosis: Community acquired pneumonia   has a past medical history of Acute encephalopathy, Acute hypoxemic respiratory failure, Acute right-sided thoracic back pain, Allergy, Asthma, Basal cell carcinoma, Basal cell carcinoma, Basal cell carcinoma, Basal cell carcinoma, Basal cell carcinoma, BCC (basal cell carcinoma of skin), Bilateral pleural effusion, Bilateral renal cysts, Breast cancer, CAD (coronary artery disease), Cardiomyopathy, Cardiomyopathy, ischemic, Cataract, Chest pain, CHF (congestive heart failure), CKD (chronic kidney disease) stage 4, GFR 15-29 ml/min, Colon polyp, 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, with long-term current use of insulin, COPD (chronic obstructive pulmonary disease), COPD exacerbation, Current mild episode of major depressive disorder without prior episode, Defibrillator discharge, Dependence on renal dialysis, Diabetes mellitus, Diabetes mellitus type II, Diabetes with neurologic complications, Edema, ESRD needing dialysis, Goiter, Hematuria, unspecified, breast cancer, Hyperlipidemia, Hypertension, Hypocalcemia, Hypokalemia, Hyponatremia,  Hyponatremia, Iron deficiency anemia, Iron deficiency anemia, Iron deficiency anemia, Left kidney mass, Meningioma, Microalbuminuria due to type 2 diabetes mellitus, Osteoporosis, postmenopausal, Pneumonia, Postinflammatory pulmonary fibrosis, Proteinuria, Pseudomonas pneumonia, SCC (squamous cell carcinoma), Skin cancer, Sleep apnea, Squamous cell carcinoma, Unspecified vitamin D deficiency, UTI (urinary tract infection), Ventricular tachycardia, Vitamin B12 deficiency, and Vitamin D deficiency disease.    Last Vitals:  Temp: 97.6 °F (36.4 °C) (03/07 0825)  Pulse: 77 (03/07 0825)  Resp: 18 (03/07 0825)  BP: 140/65 (03/07 0825)  SpO2: 97 % (03/07 0825)    24 Hour Vitals Range:  Temp:  [97.4 °F (36.3 °C)-98.4 °F (36.9 °C)]   Pulse:  [67-94]   Resp:  [18-20]   BP: (113-140)/(53-68)   SpO2:  [92 %-100 %]     Clinical Issues: Respiratory    ASSESSMENT/INTERVENTIONS    BS clear, apical pulse regular, CT in place to patent to wall suction. NAD pt in bed eating breakfast.     RECOMMENDATIONS \  Cont to monitor    Discussed plan of care with  bedside rn    PROVIDER ESCALATION    Physician escalation: No    Orders received and case discussed with NA.    Disposition:Remain in room 477    FOLLOW UP    Call back the Rapid Response NurseDanae RN at [phone] for additional questions or concerns.

## 2024-03-07 NOTE — SUBJECTIVE & OBJECTIVE
Interval History: more awake this morning, less tired but reports feeling 'bad' overall. Involuntary jerking has reduced. Bipap mask not comfortable overnight, family to bring her home cpap machine.     Family at bedside. All questions answered.     Review of Systems  Objective:     Vital Signs (Most Recent):  Temp: 98 °F (36.7 °C) (03/07/24 1144)  Pulse: 74 (03/07/24 1144)  Resp: 18 (03/07/24 1144)  BP: (!) 111/56 (03/07/24 1144)  SpO2: 96 % (03/07/24 1144) Vital Signs (24h Range):  Temp:  [97.4 °F (36.3 °C)-98.4 °F (36.9 °C)] 98 °F (36.7 °C)  Pulse:  [67-94] 74  Resp:  [18-20] 18  SpO2:  [92 %-100 %] 96 %  BP: (111-140)/(53-68) 111/56     Weight: 65.8 kg (145 lb 1 oz)  Body mass index is 25.7 kg/m².    Intake/Output Summary (Last 24 hours) at 3/7/2024 1147  Last data filed at 3/7/2024 0936  Gross per 24 hour   Intake 0 ml   Output 1134 ml   Net -1134 ml           Physical Exam  Vitals and nursing note reviewed.   Constitutional:       General: She is not in acute distress.     Comments: elderly tired appearing female   Cardiovascular:      Rate and Rhythm: Normal rate and regular rhythm.      Heart sounds: Normal heart sounds. No murmur heard.  Pulmonary:      Effort: Pulmonary effort is normal. No respiratory distress.      Breath sounds: No wheezing.   Abdominal:      Palpations: Abdomen is soft.      Tenderness: There is no abdominal tenderness.      Comments: PD catheter in place   Musculoskeletal:      Right lower leg: No edema.      Left lower leg: No edema.   Skin:     General: Skin is warm and dry.      Findings: No bruising.   Neurological:      General: No focal deficit present.      Mental Status: She is alert.      Comments: Periodic involuntary jerking movements less frequent             Significant Labs: All pertinent labs within the past 24 hours have been reviewed.    Significant Imaging: I have reviewed all pertinent imaging results/findings within the past 24 hours.

## 2024-03-07 NOTE — ASSESSMENT & PLAN NOTE
Suspected toxic/ metabolic etiology - mentation improving   2/2? Infectious vs other, ?uremia    Avoid sedating medications  Ammonia levels elevated, ?etiology, add lactulose as it is a relatively benign medication & may help  CPAP at bedtime/ naps

## 2024-03-07 NOTE — PROGRESS NOTES
NURSE PROACTIVE ROUNDING NOTE       Time of Visit:     Admit Date: 3/4/2024  LOS: 1  Code Status: Full Code   Date of Visit: 2024  : 1940  Age: 83 y.o.  Sex: female  Race: White  Bed: K477/K477 A:   MRN: [id number]  Was the patient discharged from an ICU this admission? No   Was the patient discharged from a PACU within last 24 hours? No   Did the patient receive conscious sedation/general anesthesia in last 24 hours? No   Was the patient in the ED within the past 24 hours? No   Was the patient on NIPPV within the past 24 hours? No   Attending Physician: Charlotte Villalobos MD  Primary Service: Hospitalist,Internal Medicine   Time spent at the bedside: < 15 min    SITUATION    Notified by  n/a  Reason for alert: Post thoracentesis    Diagnosis: Community acquired pneumonia   has a past medical history of Acute encephalopathy, Acute hypoxemic respiratory failure, Acute right-sided thoracic back pain, Allergy, Asthma, Basal cell carcinoma, Basal cell carcinoma, Basal cell carcinoma, Basal cell carcinoma, Basal cell carcinoma, BCC (basal cell carcinoma of skin), Bilateral pleural effusion, Bilateral renal cysts, Breast cancer, CAD (coronary artery disease), Cardiomyopathy, Cardiomyopathy, ischemic, Cataract, Chest pain, CHF (congestive heart failure), CKD (chronic kidney disease) stage 4, GFR 15-29 ml/min, Colon polyp, 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, with long-term current use of insulin, COPD (chronic obstructive pulmonary disease), COPD exacerbation, Current mild episode of major depressive disorder without prior episode, Defibrillator discharge, Dependence on renal dialysis, Diabetes mellitus, Diabetes mellitus type II, Diabetes with neurologic complications, Edema, ESRD needing dialysis, Goiter, Hematuria, unspecified, breast cancer, Hyperlipidemia, Hypertension, Hypocalcemia, Hypokalemia, Hyponatremia, Hyponatremia,  Iron deficiency anemia, Iron deficiency anemia, Iron deficiency anemia, Left kidney mass, Meningioma, Microalbuminuria due to type 2 diabetes mellitus, Osteoporosis, postmenopausal, Pneumonia, Postinflammatory pulmonary fibrosis, Proteinuria, Pseudomonas pneumonia, SCC (squamous cell carcinoma), Skin cancer, Sleep apnea, Squamous cell carcinoma, Unspecified vitamin D deficiency, UTI (urinary tract infection), Ventricular tachycardia, Vitamin B12 deficiency, and Vitamin D deficiency disease.    Last Vitals:  Temp: 97.9 °F (36.6 °C) (03/06 1957)  Pulse: 76 (03/06 1957)  Resp: 18 (03/06 1957)  BP: 123/60 (03/06 1957)  SpO2: 98 % (03/06 2031)    24 Hour Vitals Range:  Temp:  [97.5 °F (36.4 °C)-98.4 °F (36.9 °C)]   Pulse:  [72-94]   Resp:  [16-20]   BP: (102-139)/(50-65)   SpO2:  [92 %-100 %]     Clinical Issues:  Community acquired pneumonia    ASSESSMENT/INTERVENTIONS    Vital signs and labs reviewed. Pt is complaining of slight pain from the site, but otherwise says she feels okay.     RECOMMENDATIONS  Continue with current plan of care.    Discussed plan of care with  n/a    PROVIDER ESCALATION    Physician escalation: No    Orders received and case discussed with NA.    Disposition:Remain in room 477    FOLLOW UP    Call back the Rapid Response NurseRafiq at [phone] for additional questions or concerns.

## 2024-03-07 NOTE — ASSESSMENT & PLAN NOTE
Patient has hyponatremia which is controlled,We will aim to correct the sodium by 4-6mEq in 24 hours. We will monitor sodium Daily. The hyponatremia is due to Dehydration/hypovolemia, Heart Failure, and renal insufficiency. We will obtain the following studies: TSH, T4. We will treat the hyponatremia with Fluid restriction of:  1.5 liter per day. The patient's sodium results have been reviewed and are listed below.  Recent Labs   Lab 03/07/24  0449   *

## 2024-03-07 NOTE — PROGRESS NOTES
[facility] Progress Note    Subjective:      Feels about same today. Per family her breathing is improved. Thoracentesis performed yesterday with 800 cc removed then patient had coughing and chest pain so stopped before all fluid could be removed.      Objective:     Last 24 Hour Vital Signs:  BP  Min: 113/68  Max: 140/65  Temp  Av.9 °F (36.6 °C)  Min: 97.4 °F (36.3 °C)  Max: 98.4 °F (36.9 °C)  Pulse  Av.4  Min: 67  Max: 94  Resp  Av.3  Min: 18  Max: 20  SpO2  Av.3 %  Min: 92 %  Max: 100 %  I/O last 3 completed shifts:  In: 0   Out: 822 [Urine:400; Other:422]    Physical Examination:  Physical Exam  Constitutional:       General: She is not in acute distress.     Appearance: She is not ill-appearing.   Eyes:      General:         Right eye: No discharge.         Left eye: No discharge.      Conjunctiva/sclera: Conjunctivae normal.   Cardiovascular:      Rate and Rhythm: Normal rate.      Comments: Heart sounds distant  Pulmonary:      Effort: Pulmonary effort is normal. No respiratory distress.      Breath sounds: Normal breath sounds. No wheezing or rales.      Comments: On 2L NC  Abdominal:      General: There is distension.      Tenderness: There is no abdominal tenderness. There is no guarding or rebound.   Musculoskeletal:      Right lower leg: No edema.      Left lower leg: No edema.   Skin:     General: Skin is warm.      Coloration: Skin is not jaundiced.      Findings: Bruising present.   Neurological:      Comments: Sporadic whole body twitching     Laboratory:  Trended Lab Data:  Recent Labs     24  0317 24  0314 24  0315 24  0449   WBC 10.57  --  12.88* 11.38   HGB 8.5*  --  8.1* 7.2*   HCT 26.3*  --  24.6* 21.9*     --  202 203   * 133*  --  134*   K 3.8 3.7  --  3.4*   CL 97 96  --  95   CO2 17* 19*  --  24   BUN 86* 76*  --  68*   CREATININE 5.3* 5.6*  --  5.7*   * 202*  --  122*   CALCIUM 6.6* 6.8*  --  6.5*    MG 1.9 1.9  --  1.8   PHOS 8.4* 9.0*  --  8.8*       Cardiac:   Recent Labs   Lab 03/04/24  0430 03/04/24  1037 03/04/24  1421   TROPONINI 0.034* 0.047* 0.041*   *  --   --        Urinalysis:   Lab Results   Component Value Date    LABURIN No growth 06/14/2023    COLORU Yellow 02/29/2024    SPECGRAV 1.015 02/29/2024    NITRITE Negative 02/29/2024    KETONESU Negative 02/29/2024    UROBILINOGEN Negative 02/29/2024       Microbiology:  Microbiology Results (last 7 days)       Procedure Component Value Units Date/Time    AFB culture [[id number]] Collected: 03/06/24 1830    Order Status: Completed Specimen: Respiratory from Pleural Fluid Updated: 03/07/24 1108     AFB CULTURE STAIN No acid fast bacilli seen.    Culture, Respiratory with Gram Stain [[id number]] Collected: 03/05/24 0950    Order Status: Completed Specimen: Respiratory from Sputum, Expectorated Updated: 03/07/24 1053     Respiratory Culture Further report to follow     Gram Stain (Respiratory) <10 epithelial cells per low power field.     Gram Stain (Respiratory) No WBC's     Gram Stain (Respiratory) No organisms seen    Fungus culture [[id number]] Collected: 03/06/24 1830    Order Status: Completed Specimen: Respiratory from Pleural Fluid Updated: 03/07/24 1049     Fungus (Mycology) Culture Culture in progress    Culture, body fluid - Bactec [[id number]] Collected: 03/06/24 1830    Order Status: Completed Specimen: Body Fluid from Pleural Updated: 03/07/24 0715     Body Fluid Culture, Sterile No Growth to date    AFB stain [[id number]] Collected: 03/06/24 1830    Order Status: Completed Specimen: Respiratory from Pleural Fluid Updated: 03/07/24 0212     Direct Acid Fast No acid fast bacilli seen.    Culture, Fluid  (Aerobic) [[id number]] Collected: 03/06/24 1830    Order Status: Completed Specimen: Body Fluid from Pleural Updated: 03/07/24 0129     Gram Stain Result Rare WBC's      No organisms seen    KOH prep [[id number]] Collected: 03/06/24  1830    Order Status: Completed Specimen: Respiratory from Pleural Fluid Updated: 03/07/24 0115     KOH Prep No yeast or fungal elements seen    Gram stain [[id number]] Collected: 03/06/24 1830    Order Status: Canceled Specimen: Pleural Fluid     Blood culture [[id number]] Collected: 03/04/24 0749    Order Status: Completed Specimen: Blood from Peripheral, Antecubital, Left Updated: 03/06/24 1212     Blood Culture, Routine No Growth to date      No Growth to date      No Growth to date    Blood culture [[id number]] Collected: 03/04/24 0749    Order Status: Completed Specimen: Blood from Peripheral, Antecubital, Left Updated: 03/06/24 1212     Blood Culture, Routine No Growth to date      No Growth to date      No Growth to date    Aerobic culture [[id number]] Collected: 03/04/24 1840    Order Status: Completed Specimen: Incision site from Abdomen Updated: 03/06/24 0854     Aerobic Bacterial Culture No growth    Narrative:      For dialysis nurse            Radiology:  3/6/24  CXR 2000: decreased size of L sided effusion but still large-moderate. No pneumothorax.     CT chest without contrast:   Loculated left sided effusion, scattered airspace disease bilaterally     CXR 1500: increased size of left sided effusion      I have personally reviewed the above labs and imaging.    Current Medications:     Infusions:       Scheduled:   aluminum-magnesium hydroxide-simethicone  30 mL Oral Once    And    LIDOcaine viscous HCl 2%  15 mL Oral Once    aspirin  81 mg Oral Daily    azithromycin  500 mg Oral Daily    carvediloL  3.125 mg Oral BID    cyanocobalamin  100 mcg Oral Daily    ergocalciferol  50,000 Units Oral Q7 Days    furosemide (LASIX) injection  60 mg Intravenous Q12H    guaiFENesin  600 mg Oral BID    heparin (porcine)  5,000 Units Subcutaneous Q8H    insulin detemir U-100  8 Units Subcutaneous QHS    lactulose  10 g Oral TID    LIDOcaine  1 patch Transdermal Q24H    LIDOcaine  1 patch Transdermal Q24H     mupirocin   Nasal BID    piperacillin-tazobactam (Zosyn) IV (PEDS and ADULTS) (extended infusion is not appropriate)  4.5 g Intravenous Q12H    pravastatin  40 mg Oral Daily    sevelamer carbonate  1,600 mg Oral TID WM    sodium bicarbonate  650 mg Oral QID    vitamin D  2,000 Units Oral Daily        PRN:  acetaminophen, albuterol, albuterol-ipratropium, dextrose 10%, dextrose 10%, dextrose 10%, dextrose 10%, fluticasone propionate, glucagon (human recombinant), glucagon (human recombinant), glucose, glucose, glucose, glucose, hydrOXYzine HCL, insulin aspart U-100, naloxone, nitroGLYCERIN, ondansetron, sodium chloride 0.9%, Pharmacy to dose Vancomycin consult **AND** vancomycin - pharmacy to dose    Assessment:     Myesha Quinones is a 83 y.o. female patient w/ pertinent medical history of asthma, COPD, Chronic pleural effusions, Multiple lung nodules, Combined HF, Allergic Bronchopulmonary Aspergillosis, pseudomonas colonization, ESRD on PD presented with left sided chest pain and confusion after admission with same earlier last month. CT chest performed on day 2 of admission revealed enlarging unilateral L pleural effusion. Thoracentesis performed 3/6 with removal of 800cc of fluid before patient had chest pain and coughing.     Pleural studies:   Glucose: 95  LDH: 620 (serum 208)  Amylase: 20  Albumin: 1.4  Protein: 3.6 (serum 6.4)   Cytology: p/d  WBC count: 5570 (98% neutrophils)       Plan:     Uncomplicated Parapneumonic Effusion  Entrapped Left Lung   - Cannot measure pH but LDH <900 and glucose >72 so no intrapleural drain indicated by BTS 2023 Pleural Disease Guideline  - Given neutrophilic predominance and no obvious other explanation would favor conservative treatment with antibiotics  - Can stop Azithromycin  - Can de-escalate Zosyn to renally dosed Unasyn 3g QD while inpatient then Augmentin 500 BID for a total of 4 weeks (stop date 3/31)   - Would continue to be aggressive with PD/lasix for  volume removal    Altered Mental Status  - Would encourage her to wear CPAP (ideally using her home machine) or at least use her home mask which may fit better    Discussed with Staff.    Justin Presley MD  [facility] Emergency Medicine/Internal Medicine [id number]  [phone]  11:30 AM 3/7/2024

## 2024-03-07 NOTE — PROGRESS NOTES
Therapy with vancomycin complete and/or consult discontinued by provider.  Pharmacy will sign off, please re-consult as needed.    Thanks,    Cricket adler, Pharm.D

## 2024-03-07 NOTE — ASSESSMENT & PLAN NOTE
Patient's anemia is currently controlled. Has not received any PRBCs to date. Etiology likely d/t chronic disease due to Chronic Kidney Disease/ESRD  Current CBC reviewed-   Lab Results   Component Value Date    HGB 7.2 (L) 03/07/2024    HCT 21.9 (L) 03/07/2024     Monitor serial CBC and transfuse if patient becomes hemodynamically unstable, symptomatic or H/H drops below 7/21.

## 2024-03-07 NOTE — PLAN OF CARE
Problem: Occupational Therapy  Goal: Occupational Therapy Goal  Description: Goals to be met by: 4/4/23     Patient will increase functional independence with ADLs by performing:    LE Dressing with Modified [place].  Grooming while standing with Modified [place].  Toileting from toilet with Modified [place] for hygiene and clothing management.   Supine to sit with Modified [place].  Step transfer with Modified [place]  Toilet transfer to toilet with Modified [place].  Increased functional strength to WFL for self care skills and functional mobility.  Upper extremity exercise program x10 reps per handout, with independence.    Outcome: Ongoing, Progressing   Myesha Quinones is a 83 y.o. female with a medical diagnosis of Community acquired pneumonia.  Performance deficits affecting function are weakness, impaired endurance, impaired self care skills, impaired functional mobility, gait instability, impaired balance, decreased upper extremity function, decreased lower extremity function, decreased safety awareness, pain, impaired coordination, impaired fine motor, impaired skin, edema, impaired cardiopulmonary response to activity.    Pt found in bed, agreeable to therapy.  Pt with improved alertness. She continues with twitching however less and noted in UEs only this session. Pt was able to transition OOB to chair.  Pt is progressing towards goals. Continue OT services to address functional goals, progressing as able.

## 2024-03-07 NOTE — RESPIRATORY THERAPY
Attempted to place pt. On bipap as ordered by MD, pt. Refused due to size of mask (a small mask was provided)  family will bring home mask in the morning for pt. To wear bipap.  RN aware, pulled device from room due to shortage of devices in house.

## 2024-03-07 NOTE — PROCEDURES
"Myesha Quinones is a 83 y.o. female patient.    Temp: 98.4 °F (36.9 °C) (24)  Pulse: 74 (24)  Resp: 18 (24)  BP: 139/61 (24)  SpO2: 100 % (24)  Weight: 65.8 kg (145 lb 1 oz) (24)  Height: 5' 3" (160 cm) (24)       Thoracentesis    Date/Time: 3/6/2024 7:42 PM  Location procedure was performed: [facility] PULMONARY MEDICINE    Performed by: Jovon Lauren DO  Authorized by: Jovon Lauren DO  Pre-operative diagnosis: Pleural Effusion  Post-operative diagnosis: Pleural Effusion  Consent Done: Yes  Consent: Verbal consent obtained. Written consent obtained.  Risks and benefits: risks, benefits and alternatives were discussed  Consent given by: power of   Patient understanding: patient states understanding of the procedure being performed  Patient consent: the patient's understanding of the procedure matches consent given  Procedure consent: procedure consent matches procedure scheduled  Relevant documents: relevant documents present and verified  Test results: test results available and properly labeled  Site marked: the operative site was marked  Imaging studies: imaging studies available  Required items: required blood products, implants, devices, and special equipment available  Patient identity confirmed: , MRN, name, provided demographic data and verbally with patient  Time out: Immediately prior to procedure a "time out" was called to verify the correct patient, procedure, equipment, support staff and site/side marked as required.  Procedure purpose: diagnostic and therapeutic  Indications: pleural effusion  Preparation: Patient was prepped and draped in the usual sterile fashion.  Local anesthesia used: yes  Anesthesia: local infiltration    Anesthesia:  Local anesthesia used: yes  Local Anesthetic: lidocaine 1% with epinephrine  Anesthetic total: 10 mL    Patient sedated: no  Preparation: skin prepped with " ChloraPrep  Patient position: supported by bedside stand  Ultrasound guidance: yes  Location: left posterior  Intercostal space: 9th  Puncture method: over-the-needle catheter  Needle size: 20  Catheter size: 18 gauge  Number of attempts: 1  Drainage amount: 800 ml  Drainage characteristics: serous  Patient tolerance: Patient tolerated the procedure well with no immediate complications  Chest x-ray performed: yes  Chest x-ray interpreted by me and radiologist.  Chest x-ray findings: pleural effusion  Complications: No  Specimens: Yes (4 specimens sent to cytology, chemistry, micro)  Implants: No  Drainage Tube: removed        3/6/2024

## 2024-03-07 NOTE — ASSESSMENT & PLAN NOTE
Creatine stable for now. BMP reviewed- noted Estimated Creatinine Clearance: 6.7 mL/min (A) (based on SCr of 5.7 mg/dL (H)). according to latest data. 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  Monitor UOP and serial BMP and adjust therapy as needed. Renally dose meds. Avoid nephrotoxic medications and procedures.

## 2024-03-07 NOTE — PROGRESS NOTES
[facility] Medicine  Progress Note    Patient Name: Myesha Quinones  MRN: [id number]  Patient Class: IP- Inpatient   Admission Date: 3/4/2024  Length of Stay: 2 days  Attending Physician: Charlotte Villalobos MD  Primary Care Provider: Dayton Michael MD        Subjective:     Principal Problem:Community acquired pneumonia        HPI:  83-year-old woman with ESRD on PD, type 2 diabetes, hypertension, asthma/COPD, HLD, CAD breast CA presented to the ER for notion of CP. Patient was jus recently admitted to the hospital for metabolic encephalopathy and discharge one day ago.  Per family since discharge she was not herself and had multiple complaints, with difficulty sleeping, decrease appetite, fatigue, generalized weakness. The night of the admission she wake up with left sided chest pain, type pressure, radiating to her back, aggravated by deep breathing, no relieving factors, associated with SOB. At home he BS was high around 250 per family. She was given 15 units of lantus. She was brought to ED. In the ED she was found to have hypoglycemia, as well as elevated BP and decreased oxygen saturation. When seen in the ER, she was moaning with severe CP, in mild to moderate distress, O2 sat aroung 96% on 3L oxygen. Labs in the ER with elevated troponin 0.034, , H/H 8.2/23.9, low calcium,elevated ALT 49, high PTH intact 541.6, low sodium 132. CXR There is bilateral pattern of interstitial and patchy alveolar infiltrates which has been progressing compare to previous admission. She has been admitted for HCAP and R/O ACS.    Overview/Hospital Course:  No notes on file    Interval History: more awake this morning, less tired but reports feeling 'bad' overall. Involuntary jerking has reduced. Bipap mask not comfortable overnight, family to bring her home cpap machine.     Family at bedside. All questions answered.     Review of Systems  Objective:     Vital Signs (Most Recent):  Temp: 98 °F (36.7 °C)  (03/07/24 1144)  Pulse: 74 (03/07/24 1144)  Resp: 18 (03/07/24 1144)  BP: (!) 111/56 (03/07/24 1144)  SpO2: 96 % (03/07/24 1144) Vital Signs (24h Range):  Temp:  [97.4 °F (36.3 °C)-98.4 °F (36.9 °C)] 98 °F (36.7 °C)  Pulse:  [67-94] 74  Resp:  [18-20] 18  SpO2:  [92 %-100 %] 96 %  BP: (111-140)/(53-68) 111/56     Weight: 65.8 kg (145 lb 1 oz)  Body mass index is 25.7 kg/m².    Intake/Output Summary (Last 24 hours) at 3/7/2024 1147  Last data filed at 3/7/2024 0936  Gross per 24 hour   Intake 0 ml   Output 1134 ml   Net -1134 ml           Physical Exam  Vitals and nursing note reviewed.   Constitutional:       General: She is not in acute distress.     Comments: elderly tired appearing female   Cardiovascular:      Rate and Rhythm: Normal rate and regular rhythm.      Heart sounds: Normal heart sounds. No murmur heard.  Pulmonary:      Effort: Pulmonary effort is normal. No respiratory distress.      Breath sounds: No wheezing.   Abdominal:      Palpations: Abdomen is soft.      Tenderness: There is no abdominal tenderness.      Comments: PD catheter in place   Musculoskeletal:      Right lower leg: No edema.      Left lower leg: No edema.   Skin:     General: Skin is warm and dry.      Findings: No bruising.   Neurological:      General: No focal deficit present.      Mental Status: She is alert.      Comments: Periodic involuntary jerking movements less frequent             Significant Labs: All pertinent labs within the past 24 hours have been reviewed.    Significant Imaging: I have reviewed all pertinent imaging results/findings within the past 24 hours.    Assessment/Plan:      * Community acquired pneumonia  Recently discharged from the hospital and readmitted with abnormal CXR  Continue zosyn, azithromycin, vancomycin   Respiratory gram stain - no organisms seen  Blood culture NGTD    Worsening left pleural effusion, consolidation. ?progressively worsening pneumonia/ parapneumonic effusion, ?atypical vs  "fungal organism,?pleuroperitoneal leak possible in setting of PD    On chart review, prior h/o ABPA, ?MAC positive sputum cultures. Unclear if patient received complete treatment.     Pulmonary consult - recommendations reviewed   S/p bedside thoracentesis on 3/6 - fluid studies suggestive of exudative effusion, cultures/ cytology pending       Acute encephalopathy  Suspected toxic/ metabolic etiology - mentation improving   2/2? Infectious vs other, ?uremia    Avoid sedating medications  Ammonia levels elevated, ?etiology, add lactulose as it is a relatively benign medication & may help  CPAP at bedtime/ naps    Pleurodynia  ?in setting of pneumonia, pain improved with lidocaine patch       ALT (SGPT) level raised  Unclear etiology, possible in setting of infection   Repeat LFTs, consider further testing if LFT's continue to worsen      Troponin level elevated  With chest pain  Mild changes on 12 lead EKG  R/O ACS  Last 2D echo 3/1/24 Left Ventricle: The left ventricle is normal in size. There is concentric remodeling. Septal motion is consistent with pacing. There is reduced systolic function. Biplane (2D) method of discs ejection fraction is 52%.   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 Will replace calcium and monitor electrolytes closely. The latest calcium labs have been reviewed and are listed below.  Recent Labs   Lab 03/07/24  0449   CALCIUM 6.5*         No results for input(s): "CAION" in the last 24 hours.    Calcium supplement      ESRD on peritoneal dialysis  Resume PD  Nephrology consult  Monitor electrolytes      Acute hypoxemic respiratory failure  Patient with Hypoxic Respiratory failure which is Acute.  she is not on home oxygen. Supplemental oxygen was provided and noted-  "     .   Signs/symptoms of respiratory failure include- tachypnea, increased work of breathing, and lethargy. Contributing diagnoses includes - Pneumonia Labs and images were reviewed. Patient Has not had a recent ABG. Will treat underlying causes and adjust management of respiratory failure as follows- IV antibiotics     On 2 L oxygen NC. Wean as tolerated.     Essential hypertension  Chronic, controlled. Latest blood pressure and vitals reviewed-     Temp:  [97.2 °F (36.2 °C)-98.4 °F (36.9 °C)]   Pulse:  [71-94]   Resp:  [16-20]   BP: (102-131)/(50-65)   SpO2:  [92 %-97 %] .   Home meds for hypertension were reviewed and noted below.   Hypertension Medications               carvediloL (COREG) 25 MG tablet TAKE 2 TABLETS (50 MG TOTAL) BY MOUTH 2 (TWO) TIMES DAILY.    furosemide (LASIX) 40 MG tablet Take 1 tablet (40 mg total) by mouth once daily.    hydrALAZINE (APRESOLINE) 100 MG tablet Take 1 tablet (100 mg total) by mouth 2 (two) times daily.    nitroGLYCERIN (NITROSTAT) 0.4 MG SL tablet Place 0.4 mg under the tongue every 5 (five) minutes as needed for Chest pain.     valsartan (DIOVAN) 160 MG tablet Take 160 mg by mouth once daily.            While in the hospital, will manage blood pressure as follows; Adjust home antihypertensive regimen as follows- holding all antihypertensives due to borderline low BP, restart as necessary    Will utilize p.r.n. blood pressure medication only if patient's blood pressure greater than 180/110 and she develops symptoms such as worsening chest pain or shortness of breath.    Hyponatremia  Patient has hyponatremia which is controlled,We will aim to correct the sodium by 4-6mEq in 24 hours. We will monitor sodium Daily. The hyponatremia is due to Dehydration/hypovolemia, Heart Failure, and renal insufficiency. We will obtain the following studies: TSH, T4. We will treat the hyponatremia with Fluid restriction of:  1.5 liter per day. The patient's sodium results have been reviewed  and are listed below.  Recent Labs   Lab 03/07/24  0449   *         Acute on chronic combined systolic and diastolic heart failure  With elevated BNP, not in acute decompensation  On lasix, volume removal with PD  Daily weight  I&Os      Hyperlipidemia associated with type 2 diabetes mellitus  Cont statin      Weakness  Likely from age related debility  Will consult PT  Encourage mobilization      COPD (chronic obstructive pulmonary disease)  Patient's COPD is controlled currently.  Patient is currently off COPD Pathway. Continue scheduled inhalers Antibiotics and Supplemental oxygen and monitor respiratory status closely.     Iron deficiency anemia  Patient's anemia is currently controlled. Has not received any PRBCs to date. Etiology likely d/t chronic disease due to Chronic Kidney Disease/ESRD  Current CBC reviewed-   Lab Results   Component Value Date    HGB 7.2 (L) 03/07/2024    HCT 21.9 (L) 03/07/2024     Monitor serial CBC and transfuse if patient becomes hemodynamically unstable, symptomatic or H/H drops below 7/21.    Type 2 diabetes mellitus with stage 4 chronic kidney disease and hypertension  Creatine stable for now. BMP reviewed- noted Estimated Creatinine Clearance: 6.7 mL/min (A) (based on SCr of 5.7 mg/dL (H)). according to latest data. 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  Monitor UOP and serial BMP and adjust therapy as needed. Renally dose meds. Avoid nephrotoxic medications and procedures.    Coronary artery disease involving native coronary artery without angina pectoris - Non-obstructive 50% LAD  Patient with known CAD s/p  pacemaker and stent placement, which is controlled Will continue ASA and monitor for S/Sx of angina/ACS. Continue to monitor on telemetry.       VTE Risk Mitigation (From admission, onward)           Ordered     heparin (porcine) injection 5,000 Units  Every 8 hours         03/04/24 0601     IP VTE HIGH RISK PATIENT  Once         03/04/24 0601     Place  sequential compression device  Until discontinued         03/04/24 0601                    Discharge Planning   ALIX:      Code Status: Full Code   Is the patient medically ready for discharge?:     Reason for patient still in hospital (select all that apply): Patient trending condition, Treatment, and Consult recommendations  Discharge Plan A: Home with family, Home Health            Charlotte Villalobos MD  Department of [facility] Medicine   [facility]

## 2024-03-07 NOTE — ASSESSMENT & PLAN NOTE
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 Will replace calcium and monitor electrolytes closely. The latest calcium labs have been reviewed and are listed below.  Recent Labs   Lab 03/07/24  0449   CALCIUM 6.5*         No results for input(s): "CAION" in the last 24 hours.    Calcium supplement    "

## 2024-03-08 LAB
ALBUMIN SERPL BCP-MCNC: 2 G/DL (ref 3.5–5.2)
ALP SERPL-CCNC: 95 U/L (ref 55–135)
ALT SERPL W/O P-5'-P-CCNC: 23 U/L (ref 10–44)
ANION GAP SERPL CALC-SCNC: 19 MMOL/L (ref 8–16)
AST SERPL-CCNC: 20 U/L (ref 10–40)
BACTERIA SPEC AEROBE CULT: ABNORMAL
BACTERIA SPEC AEROBE CULT: ABNORMAL
BACTERIA SPEC AEROBE CULT: NO GROWTH
BASOPHILS # BLD AUTO: 0.04 K/UL (ref 0–0.2)
BASOPHILS NFR BLD: 0.3 % (ref 0–1.9)
BILIRUB DIRECT SERPL-MCNC: 0.2 MG/DL (ref 0.1–0.3)
BILIRUB SERPL-MCNC: 0.3 MG/DL (ref 0.1–1)
BUN SERPL-MCNC: 63 MG/DL (ref 8–23)
CALCIUM SERPL-MCNC: 6.6 MG/DL (ref 8.7–10.5)
CHLORIDE SERPL-SCNC: 94 MMOL/L (ref 95–110)
CO2 SERPL-SCNC: 24 MMOL/L (ref 23–29)
CREAT SERPL-MCNC: 5.8 MG/DL (ref 0.5–1.4)
DIFFERENTIAL METHOD BLD: ABNORMAL
EOSINOPHIL # BLD AUTO: 0.1 K/UL (ref 0–0.5)
EOSINOPHIL NFR BLD: 1.1 % (ref 0–8)
ERYTHROCYTE [DISTWIDTH] IN BLOOD BY AUTOMATED COUNT: 13.7 % (ref 11.5–14.5)
EST. GFR  (NO RACE VARIABLE): 7 ML/MIN/1.73 M^2
FINAL PATHOLOGIC DIAGNOSIS: NORMAL
GLUCOSE SERPL-MCNC: 131 MG/DL (ref 70–110)
GRAM STN SPEC: ABNORMAL
HCT VFR BLD AUTO: 24 % (ref 37–48.5)
HGB BLD-MCNC: 7.6 G/DL (ref 12–16)
IMM GRANULOCYTES # BLD AUTO: 0.2 K/UL (ref 0–0.04)
IMM GRANULOCYTES NFR BLD AUTO: 1.5 % (ref 0–0.5)
LYMPHOCYTES # BLD AUTO: 1.1 K/UL (ref 1–4.8)
LYMPHOCYTES NFR BLD: 8.3 % (ref 18–48)
Lab: NORMAL
MCH RBC QN AUTO: 29.9 PG (ref 27–31)
MCHC RBC AUTO-ENTMCNC: 31.7 G/DL (ref 32–36)
MCV RBC AUTO: 95 FL (ref 82–98)
MONOCYTES # BLD AUTO: 1 K/UL (ref 0.3–1)
MONOCYTES NFR BLD: 7.8 % (ref 4–15)
NEUTROPHILS # BLD AUTO: 10.7 K/UL (ref 1.8–7.7)
NEUTROPHILS NFR BLD: 81 % (ref 38–73)
NRBC BLD-RTO: 0 /100 WBC
PLATELET # BLD AUTO: 240 K/UL (ref 150–450)
PMV BLD AUTO: 9.9 FL (ref 9.2–12.9)
POCT GLUCOSE: 104 MG/DL (ref 70–110)
POCT GLUCOSE: 111 MG/DL (ref 70–110)
POCT GLUCOSE: 141 MG/DL (ref 70–110)
POCT GLUCOSE: 220 MG/DL (ref 70–110)
POCT GLUCOSE: 69 MG/DL (ref 70–110)
POTASSIUM SERPL-SCNC: 3.9 MMOL/L (ref 3.5–5.1)
PROT SERPL-MCNC: 5.9 G/DL (ref 6–8.4)
RBC # BLD AUTO: 2.54 M/UL (ref 4–5.4)
SODIUM SERPL-SCNC: 137 MMOL/L (ref 136–145)
WBC # BLD AUTO: 13.19 K/UL (ref 3.9–12.7)

## 2024-03-08 PROCEDURE — 80048 BASIC METABOLIC PNL TOTAL CA: CPT | Performed by: STUDENT IN AN ORGANIZED HEALTH CARE EDUCATION/TRAINING PROGRAM

## 2024-03-08 PROCEDURE — 25000003 PHARM REV CODE 250: Performed by: STUDENT IN AN ORGANIZED HEALTH CARE EDUCATION/TRAINING PROGRAM

## 2024-03-08 PROCEDURE — 99900035 HC TECH TIME PER 15 MIN (STAT)

## 2024-03-08 PROCEDURE — 94761 N-INVAS EAR/PLS OXIMETRY MLT: CPT

## 2024-03-08 PROCEDURE — 25000003 PHARM REV CODE 250: Mod: JZ,JG | Performed by: REGISTERED NURSE

## 2024-03-08 PROCEDURE — 85025 COMPLETE CBC W/AUTO DIFF WBC: CPT | Performed by: STUDENT IN AN ORGANIZED HEALTH CARE EDUCATION/TRAINING PROGRAM

## 2024-03-08 PROCEDURE — 36415 COLL VENOUS BLD VENIPUNCTURE: CPT | Performed by: STUDENT IN AN ORGANIZED HEALTH CARE EDUCATION/TRAINING PROGRAM

## 2024-03-08 PROCEDURE — 97530 THERAPEUTIC ACTIVITIES: CPT | Mod: CO

## 2024-03-08 PROCEDURE — 80076 HEPATIC FUNCTION PANEL: CPT | Performed by: STUDENT IN AN ORGANIZED HEALTH CARE EDUCATION/TRAINING PROGRAM

## 2024-03-08 PROCEDURE — 27100171 HC OXYGEN HIGH FLOW UP TO 24 HOURS

## 2024-03-08 PROCEDURE — 25000003 PHARM REV CODE 250: Performed by: FAMILY MEDICINE

## 2024-03-08 PROCEDURE — 11000001 HC ACUTE MED/SURG PRIVATE ROOM

## 2024-03-08 PROCEDURE — 5A09357 ASSISTANCE WITH RESPIRATORY VENTILATION, LESS THAN 24 CONSECUTIVE HOURS, CONTINUOUS POSITIVE AIRWAY PRESSURE: ICD-10-PCS | Performed by: STUDENT IN AN ORGANIZED HEALTH CARE EDUCATION/TRAINING PROGRAM

## 2024-03-08 PROCEDURE — 90945 DIALYSIS ONE EVALUATION: CPT

## 2024-03-08 PROCEDURE — 97116 GAIT TRAINING THERAPY: CPT

## 2024-03-08 PROCEDURE — 94799 UNLISTED PULMONARY SVC/PX: CPT | Mod: XB

## 2024-03-08 PROCEDURE — 63600175 PHARM REV CODE 636 W HCPCS: Performed by: FAMILY MEDICINE

## 2024-03-08 PROCEDURE — 94660 CPAP INITIATION&MGMT: CPT

## 2024-03-08 PROCEDURE — 25000003 PHARM REV CODE 250: Performed by: HOSPITALIST

## 2024-03-08 PROCEDURE — 97110 THERAPEUTIC EXERCISES: CPT | Mod: CO

## 2024-03-08 PROCEDURE — 63600175 PHARM REV CODE 636 W HCPCS: Performed by: HOSPITALIST

## 2024-03-08 PROCEDURE — 25000003 PHARM REV CODE 250: Performed by: INTERNAL MEDICINE

## 2024-03-08 PROCEDURE — 63600175 PHARM REV CODE 636 W HCPCS

## 2024-03-08 PROCEDURE — 97110 THERAPEUTIC EXERCISES: CPT

## 2024-03-08 PROCEDURE — 27000221 HC OXYGEN, UP TO 24 HOURS

## 2024-03-08 PROCEDURE — 25000003 PHARM REV CODE 250: Performed by: NURSE PRACTITIONER

## 2024-03-08 RX ORDER — LACTULOSE 10 G/15ML
10 SOLUTION ORAL DAILY
Status: DISCONTINUED | OUTPATIENT
Start: 2024-03-09 | End: 2024-03-11 | Stop reason: HOSPADM

## 2024-03-08 RX ORDER — CALCIUM GLUCONATE 20 MG/ML
1 INJECTION, SOLUTION INTRAVENOUS ONCE
Status: COMPLETED | OUTPATIENT
Start: 2024-03-08 | End: 2024-03-08

## 2024-03-08 RX ADMIN — MUPIROCIN: 20 OINTMENT TOPICAL at 08:03

## 2024-03-08 RX ADMIN — SODIUM BICARBONATE 650 MG: 650 TABLET ORAL at 08:03

## 2024-03-08 RX ADMIN — SEVELAMER CARBONATE 1600 MG: 800 TABLET, FILM COATED ORAL at 04:03

## 2024-03-08 RX ADMIN — CALCIUM GLUCONATE 1 G: 20 INJECTION, SOLUTION INTRAVENOUS at 08:03

## 2024-03-08 RX ADMIN — GUAIFENESIN 600 MG: 600 TABLET, EXTENDED RELEASE ORAL at 08:03

## 2024-03-08 RX ADMIN — SODIUM BICARBONATE 650 MG: 650 TABLET ORAL at 04:03

## 2024-03-08 RX ADMIN — Medication 16 G: at 06:03

## 2024-03-08 RX ADMIN — SODIUM BICARBONATE 650 MG: 650 TABLET ORAL at 12:03

## 2024-03-08 RX ADMIN — GUAIFENESIN 600 MG: 600 TABLET, EXTENDED RELEASE ORAL at 09:03

## 2024-03-08 RX ADMIN — HEPARIN SODIUM 5000 UNITS: 5000 INJECTION INTRAVENOUS; SUBCUTANEOUS at 09:03

## 2024-03-08 RX ADMIN — INSULIN ASPART 1 UNITS: 100 INJECTION, SOLUTION INTRAVENOUS; SUBCUTANEOUS at 09:03

## 2024-03-08 RX ADMIN — FUROSEMIDE 60 MG: 10 INJECTION, SOLUTION INTRAMUSCULAR; INTRAVENOUS at 08:03

## 2024-03-08 RX ADMIN — CARVEDILOL 3.12 MG: 3.12 TABLET, FILM COATED ORAL at 09:03

## 2024-03-08 RX ADMIN — MUPIROCIN: 20 OINTMENT TOPICAL at 09:03

## 2024-03-08 RX ADMIN — HEPARIN SODIUM 5000 UNITS: 5000 INJECTION INTRAVENOUS; SUBCUTANEOUS at 01:03

## 2024-03-08 RX ADMIN — SEVELAMER CARBONATE 1600 MG: 800 TABLET, FILM COATED ORAL at 12:03

## 2024-03-08 RX ADMIN — SEVELAMER CARBONATE 1600 MG: 800 TABLET, FILM COATED ORAL at 09:03

## 2024-03-08 RX ADMIN — PRAVASTATIN SODIUM 40 MG: 40 TABLET ORAL at 08:03

## 2024-03-08 RX ADMIN — HEPARIN SODIUM 5000 UNITS: 5000 INJECTION INTRAVENOUS; SUBCUTANEOUS at 06:03

## 2024-03-08 RX ADMIN — Medication 100 MCG: at 08:03

## 2024-03-08 RX ADMIN — CARVEDILOL 3.12 MG: 3.12 TABLET, FILM COATED ORAL at 08:03

## 2024-03-08 RX ADMIN — ASPIRIN 81 MG CHEWABLE TABLET 81 MG: 81 TABLET CHEWABLE at 08:03

## 2024-03-08 RX ADMIN — CHOLECALCIFEROL TAB 25 MCG (1000 UNIT) 2000 UNITS: 25 TAB at 08:03

## 2024-03-08 RX ADMIN — SODIUM BICARBONATE 650 MG: 650 TABLET ORAL at 09:03

## 2024-03-08 RX ADMIN — SEVELAMER CARBONATE 1600 MG: 800 TABLET, FILM COATED ORAL at 08:03

## 2024-03-08 RX ADMIN — PIPERACILLIN AND TAZOBACTAM 4.5 G: 4; .5 INJECTION, POWDER, LYOPHILIZED, FOR SOLUTION INTRAVENOUS; PARENTERAL at 10:03

## 2024-03-08 RX ADMIN — FUROSEMIDE 60 MG: 10 INJECTION, SOLUTION INTRAMUSCULAR; INTRAVENOUS at 09:03

## 2024-03-08 RX ADMIN — PIPERACILLIN AND TAZOBACTAM 4.5 G: 4; .5 INJECTION, POWDER, LYOPHILIZED, FOR SOLUTION INTRAVENOUS; PARENTERAL at 08:03

## 2024-03-08 NOTE — PT/OT/SLP PROGRESS
"Occupational Therapy   Treatment    Name: Myesha Quinones  MRN: [id number]  Admitting Diagnosis:  Community acquired pneumonia       Recommendations:     Discharge Recommendations: Moderate Intensity Therapy  Discharge Equipment Recommendations:  none  Barriers to discharge:  Decreased caregiver support, Other (Comment) (Increased level of assistance)    Assessment:     Myesha Quinones is a 83 y.o. female with a medical diagnosis of Community acquired pneumonia.  Performance deficits affecting function are weakness, impaired endurance, impaired self care skills, impaired functional mobility, gait instability, impaired balance, decreased upper extremity function, decreased lower extremity function, pain, impaired coordination, impaired fine motor, impaired skin, edema.    Pt found in bed, agreeable to therapy.  Pt improving daily. Continues with involuntary jerks however improving. Continue OT services to address functional goals, progressing as able.      Rehab Prognosis:  Good; patient would benefit from acute skilled OT services to address these deficits and reach maximum level of function.       Plan:     Patient to be seen 5 x/week to address the above listed problems via self-care/home management, therapeutic activities, therapeutic exercises  Plan of Care Expires: 04/04/24  Plan of Care Reviewed with: patient    Subjective     Chief Complaint: "I needed to get out of bed, my back was hurting."  Patient/Family Comments/goals: to return to PLOF   Pain/Comfort:  Pain Rating 1:  (back pain-did not rate)    Objective:     Communicated with: RN prior to session.  Patient found up in chair with bed alarm, peripheral IV, oxygen, telemetry upon OT entry to room.    General Precautions: Standard, fall, hearing impaired    Orthopedic Precautions:N/A  Braces: N/A  Respiratory Status: Nasal cannula     Occupational Performance:     Bed Mobility:    Declined back to bed     Functional Mobility/Transfers:  Patient " completed Sit <> Stand Transfer with contact guard assistance  with  rolling walker   Functional Mobility: Pt ambulated with CGA/Min A around bed and back. Pt requires assist to manage RW safely and vcs for RW proximity.      [facility] 6 Click ADL: 15    Treatment & Education:  Pt performed BUE AROM ex x 5-10 reps all jts/planes.  Pt with jerking throughout.   Significant swelling noted in RUE.  Elevated on pillow.      Patient left up in chair with all lines intact, call button in reach, and son in law present    GOALS:   Multidisciplinary Problems       Occupational Therapy Goals          Problem: Occupational Therapy    Goal Priority Disciplines Outcome Interventions   Occupational Therapy Goal     OT, PT/OT Ongoing, Progressing    Description: Goals to be met by: 4/4/23     Patient will increase functional independence with ADLs by performing:    LE Dressing with Modified [place].  Grooming while standing with Modified [place].  Toileting from toilet with Modified [place] for hygiene and clothing management.   Supine to sit with Modified [place].  Step transfer with Modified [place]  Toilet transfer to toilet with Modified [place].  Increased functional strength to WFL for self care skills and functional mobility.  Upper extremity exercise program x10 reps per handout, with independence.                         Time Tracking:     OT Date of Treatment: 03/08/24  OT Start Time: 1140  OT Stop Time: 1202  OT Total Time (min): 22 min    Billable Minutes:Therapeutic Activity 14  Therapeutic Exercise 8            3/8/2024

## 2024-03-08 NOTE — SUBJECTIVE & OBJECTIVE
Interval History: more alert. Has poor appetite, continues to have intermittent involuntary jerking.     Review of Systems  Objective:     Vital Signs (Most Recent):  Temp: 98.4 °F (36.9 °C) (03/08/24 1143)  Pulse: 96 (03/08/24 1234)  Resp: 18 (03/08/24 1143)  BP: (!) 134/100 (03/08/24 1143)  SpO2: 97 % (03/08/24 1143) Vital Signs (24h Range):  Temp:  [97.3 °F (36.3 °C)-98.4 °F (36.9 °C)] 98.4 °F (36.9 °C)  Pulse:  [63-96] 96  Resp:  [16-18] 18  SpO2:  [95 %-100 %] 97 %  BP: (127-153)/() 134/100     Weight: 65.8 kg (145 lb 1 oz)  Body mass index is 25.7 kg/m².    Intake/Output Summary (Last 24 hours) at 3/8/2024 1411  Last data filed at 3/8/2024 1029  Gross per 24 hour   Intake 240 ml   Output 1475 ml   Net -1235 ml           Physical Exam  Vitals and nursing note reviewed.   Constitutional:       General: She is not in acute distress.     Comments: elderly tired appearing female   Cardiovascular:      Rate and Rhythm: Normal rate and regular rhythm.      Heart sounds: Normal heart sounds. No murmur heard.  Pulmonary:      Effort: Pulmonary effort is normal. No respiratory distress.   Abdominal:      Palpations: Abdomen is soft.      Tenderness: There is no abdominal tenderness.      Comments: PD catheter in place   Musculoskeletal:      Right lower leg: No edema.      Left lower leg: No edema.   Skin:     General: Skin is warm and dry.      Findings: No bruising.   Neurological:      General: No focal deficit present.      Mental Status: She is alert.      Comments: Periodic involuntary jerking movements less frequent             Significant Labs: All pertinent labs within the past 24 hours have been reviewed.    Significant Imaging: I have reviewed all pertinent imaging results/findings within the past 24 hours.

## 2024-03-08 NOTE — PLAN OF CARE
SW met with pt at bedside during rounding with MD. No dc today. SW will continue to follow pt throughout her transitions of care and assist with any dc needs.     Future Appointments   Date Time Provider Department Center   3/14/2024  9:30 AM CARRIE Arechiga MD [facility]   3/15/2024 10:00 AM Ev Jiménez MD [facility] IMPRI [facility]   4/10/2024  3:30 PM Kelvin Maki MD [facility] PULMSVC [facility]   4/17/2024  9:00 AM Eric Brooke PA-C [facility] UROGYN [facility]   4/24/2024 10:40 AM Dayton Michael MD [facility] FAM MED [facility]   6/5/2024 12:15 PM EKG, APPT [facility] EKG [facility]   6/5/2024 12:40 PM COORDINATED DEVICE CHECK [facility] ARRHPRO [facility]   6/5/2024  1:30 PM Celeste Rogers, SHELBI [facility] ARRHYTH [facility]        03/08/24 1017   Rounds   Attendance Provider;;Pharmacist   Discharge Plan A Home with family;Home Health   Why the patient remains in the hospital Requires continued medical care   Transition of Care Barriers None

## 2024-03-08 NOTE — PLAN OF CARE
Patient participated in physical therapy session on this date.  Patient presents up in chair, agreeable to therapy.  Patient is making good progress toward PT goals.  Patient will benefit from moderate intensity therapy at discharge.  Full report to follow.      Problem: Physical Therapy  Goal: Physical Therapy Goal  Description: Goals to be met by: 4/3/2024     Patient will increase functional independence with mobility by performin. Supine to sit with Modified [place]  2. Sit to supine with Modified [place]  3. Sit to stand transfer with Stand-by Assistance  4. Bed to chair transfer with Stand-by Assistance using Rolling Walker  5. Gait  x 150 feet with Stand-by Assistance using Rolling Walker.     Outcome: Ongoing, Progressing      Spoke with pt and let him know the letter may have been sent to him by accident. He doesn't see a provider at the [facility].

## 2024-03-08 NOTE — ASSESSMENT & PLAN NOTE
Recently discharged from the hospital and readmitted with abnormal CXR  Continue zosyn, azithromycin, vancomycin   Blood culture NGTD    Associated with a uncomplicated parapneumonic effusion     On chart review, prior h/o ABPA, ?MAC positive sputum cultures. Unclear if patient received complete treatment.     Pulmonary consult - recommendations reviewed   S/p bedside thoracentesis on 3/6 - fluid studies suggestive of exudative effusion, cultures/ cytology pending     Sputum culture growing pseudomonas, continue zosyn while inpatient, switch to PO at DC to complete 4-6 weeks of antibiotics per pulm recommendations

## 2024-03-08 NOTE — NURSING
RAPID RESPONSE NURSE PROACTIVE ROUNDING NOTE       Single lumen 18G, 2.25IN AccuCath placed in the  RIGHT FOREARM  . Needle advanced into the vessel under real time ultrasound guidance.    Max dwell date: 3/17/24   Lot number: [id number]

## 2024-03-08 NOTE — PLAN OF CARE
Patient on oxygen in no apparent distress, given IS treatment, no adverse reactions will continue to monitor.

## 2024-03-08 NOTE — NURSING
Rapid Response Nurse Follow-up Note     Followed up with patient for proactive rounding. Pt resting in bed w/ no complaints at this time. VSS. No acute issues at this time. Reviewed plan of care with bedside RNCarla .   Team will continue to follow.  Please call Rapid Response RN, MONSE CASTILLO RN with any questions or concerns at [phone].

## 2024-03-08 NOTE — PROGRESS NOTES
03/08/24 0915        Peritoneal Dialysis Catheter 04/25/23 0930 Left lower abdomen   Placement Date/Time: 04/25/23 0930   Present Prior to Hospital Arrival?: No  Inserted by: MD  Catheter Location: Left lower abdomen   Site Assessment Clean;Intact;Dry   Dressing Intervention Integrity maintained   Status Deaccessed   Dressing Status Clean;Dry;Intact   Dressing Gauze   Securement secured to abdomen w/ adhesive device   Clamp Status clamped   Exchange completed   Peritoneal Dialysis   Exchange Type Cycler   Peritoneal Treatment Status Completed   Dianeal Solution Dextrose 1.5% in 6000 mL   Cycler Peritoneal Dialysis   Initial Drain Volume (mL) 36 mL   Effluent Appearance Yellow   Number of Cycles 5   Fill Volume (mL) 2400 mL   Total Volume (mL) 04251 mL   Dwell Time (specify hr/min) 1.27   Average Dwell Time (specify hr/min) 1.26   Cycler PD Total UF (mL) 675 mL   Cycler Treatment Comments completed overnight without complications

## 2024-03-08 NOTE — PROGRESS NOTES
03/07/24 1901        Peritoneal Dialysis Catheter 04/25/23 0930 Left lower abdomen   Placement Date/Time: 04/25/23 0930   Present Prior to Hospital Arrival?: No  Inserted by: MD  Catheter Location: Left lower abdomen   Site Assessment Clean;Dry;Intact;No redness;No swelling   Dressing Intervention Sterile dressing change   Status Accessed   Dressing Status Clean;Dry;Intact   Dressing Tube stabilization device   Securement secured to abdomen with tape   Clamp Status unclamped   Peritoneal Dialysis   Exchange Type Cycler   Peritoneal Treatment Status Started   Dianeal Solution Dextrose 1.5% in 6000 mL;Dextrose 1.5% in 2000 mL   Cycler Peritoneal Dialysis   Cycler Treatment Comments patient dressing changed per policy. patient placed on cycler per MD orders.

## 2024-03-08 NOTE — PROGRESS NOTES
[facility] Medicine  Progress Note    Patient Name: Myesha Quinones  MRN: [id number]  Patient Class: IP- Inpatient   Admission Date: 3/4/2024  Length of Stay: 3 days  Attending Physician: Charlotte Villalobos MD  Primary Care Provider: Dayton Michael MD        Subjective:     Principal Problem:Community acquired pneumonia        HPI:  83-year-old woman with ESRD on PD, type 2 diabetes, hypertension, asthma/COPD, HLD, CAD breast CA presented to the ER for notion of CP. Patient was jus recently admitted to the hospital for metabolic encephalopathy and discharge one day ago.  Per family since discharge she was not herself and had multiple complaints, with difficulty sleeping, decrease appetite, fatigue, generalized weakness. The night of the admission she wake up with left sided chest pain, type pressure, radiating to her back, aggravated by deep breathing, no relieving factors, associated with SOB. At home he BS was high around 250 per family. She was given 15 units of lantus. She was brought to ED. In the ED she was found to have hypoglycemia, as well as elevated BP and decreased oxygen saturation. When seen in the ER, she was moaning with severe CP, in mild to moderate distress, O2 sat aroung 96% on 3L oxygen. Labs in the ER with elevated troponin 0.034, , H/H 8.2/23.9, low calcium,elevated ALT 49, high PTH intact 541.6, low sodium 132. CXR There is bilateral pattern of interstitial and patchy alveolar infiltrates which has been progressing compare to previous admission. She has been admitted for HCAP and R/O ACS.    Overview/Hospital Course:  No notes on file    Interval History: more alert. Has poor appetite, continues to have intermittent involuntary jerking.     Review of Systems  Objective:     Vital Signs (Most Recent):  Temp: 98.4 °F (36.9 °C) (03/08/24 1143)  Pulse: 96 (03/08/24 1234)  Resp: 18 (03/08/24 1143)  BP: (!) 134/100 (03/08/24 1143)  SpO2: 97 % (03/08/24 1143) Vital Signs  (24h Range):  Temp:  [97.3 °F (36.3 °C)-98.4 °F (36.9 °C)] 98.4 °F (36.9 °C)  Pulse:  [63-96] 96  Resp:  [16-18] 18  SpO2:  [95 %-100 %] 97 %  BP: (127-153)/() 134/100     Weight: 65.8 kg (145 lb 1 oz)  Body mass index is 25.7 kg/m².    Intake/Output Summary (Last 24 hours) at 3/8/2024 1411  Last data filed at 3/8/2024 1029  Gross per 24 hour   Intake 240 ml   Output 1475 ml   Net -1235 ml           Physical Exam  Vitals and nursing note reviewed.   Constitutional:       General: She is not in acute distress.     Comments: elderly tired appearing female   Cardiovascular:      Rate and Rhythm: Normal rate and regular rhythm.      Heart sounds: Normal heart sounds. No murmur heard.  Pulmonary:      Effort: Pulmonary effort is normal. No respiratory distress.   Abdominal:      Palpations: Abdomen is soft.      Tenderness: There is no abdominal tenderness.      Comments: PD catheter in place   Musculoskeletal:      Right lower leg: No edema.      Left lower leg: No edema.   Skin:     General: Skin is warm and dry.      Findings: No bruising.   Neurological:      General: No focal deficit present.      Mental Status: She is alert.      Comments: Periodic involuntary jerking movements less frequent             Significant Labs: All pertinent labs within the past 24 hours have been reviewed.    Significant Imaging: I have reviewed all pertinent imaging results/findings within the past 24 hours.    Assessment/Plan:      * Community acquired pneumonia  Recently discharged from the hospital and readmitted with abnormal CXR  Continue zosyn, azithromycin, vancomycin   Blood culture NGTD    Associated with a uncomplicated parapneumonic effusion     On chart review, prior h/o ABPA, ?MAC positive sputum cultures. Unclear if patient received complete treatment.     Pulmonary consult - recommendations reviewed   S/p bedside thoracentesis on 3/6 - fluid studies suggestive of exudative effusion, cultures/ cytology pending  "    Sputum culture growing pseudomonas, continue zosyn while inpatient, switch to PO at DC to complete 4-6 weeks of antibiotics per pulm recommendations      Acute encephalopathy  Suspected toxic/ metabolic etiology - mentation improving but continues to have involuntary periodic jerking of bilateral UE   2/2? Infectious vs other, ?uremia. No culprit medications noted    Avoid sedating medications  Ammonia levels elevated, ?etiology, add lactulose as it is a relatively benign medication & may help  CPAP at bedtime/ naps    Neurology consult for involuntary jerks    Pleurodynia  ?in setting of pneumonia, pain improved with lidocaine patch       ALT (SGPT) level raised  Unclear etiology, possible in setting of infection   Repeat LFTs, consider further testing if LFT's continue to worsen      Troponin level elevated  With chest pain  Mild changes on 12 lead EKG  R/O ACS  Last 2D echo 3/1/24 Left Ventricle: The left ventricle is normal in size. There is concentric remodeling. Septal motion is consistent with pacing. There is reduced systolic function. Biplane (2D) method of discs ejection fraction is 52%.   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 Will replace calcium and monitor electrolytes closely. The latest calcium labs have been reviewed and are listed below.  Recent Labs   Lab 03/08/24  0247   CALCIUM 6.6*         No results for input(s): "CAION" in the last 24 hours.    Calcium supplement      ESRD on peritoneal dialysis  Resume PD  Nephrology consult  Monitor electrolytes      Acute hypoxemic respiratory failure  Patient with Hypoxic Respiratory failure which is Acute.  she is not on home oxygen. Supplemental oxygen was provided and noted-      .   Signs/symptoms of respiratory failure " include- tachypnea, increased work of breathing, and lethargy. Contributing diagnoses includes - Pneumonia Labs and images were reviewed. Patient Has not had a recent ABG. Will treat underlying causes and adjust management of respiratory failure as follows- IV antibiotics     On 2 L oxygen NC. Wean as tolerated.     Essential hypertension  Chronic, controlled. Latest blood pressure and vitals reviewed-     Temp:  [97.2 °F (36.2 °C)-98.4 °F (36.9 °C)]   Pulse:  [71-94]   Resp:  [16-20]   BP: (102-131)/(50-65)   SpO2:  [92 %-97 %] .   Home meds for hypertension were reviewed and noted below.   Hypertension Medications               carvediloL (COREG) 25 MG tablet TAKE 2 TABLETS (50 MG TOTAL) BY MOUTH 2 (TWO) TIMES DAILY.    furosemide (LASIX) 40 MG tablet Take 1 tablet (40 mg total) by mouth once daily.    hydrALAZINE (APRESOLINE) 100 MG tablet Take 1 tablet (100 mg total) by mouth 2 (two) times daily.    nitroGLYCERIN (NITROSTAT) 0.4 MG SL tablet Place 0.4 mg under the tongue every 5 (five) minutes as needed for Chest pain.     valsartan (DIOVAN) 160 MG tablet Take 160 mg by mouth once daily.            While in the hospital, will manage blood pressure as follows; Adjust home antihypertensive regimen as follows- holding all antihypertensives due to borderline low BP, restart as necessary    Will utilize p.r.n. blood pressure medication only if patient's blood pressure greater than 180/110 and she develops symptoms such as worsening chest pain or shortness of breath.    Hyponatremia  Patient has hyponatremia which is controlled,We will aim to correct the sodium by 4-6mEq in 24 hours. We will monitor sodium Daily. The hyponatremia is due to Dehydration/hypovolemia, Heart Failure, and renal insufficiency. We will obtain the following studies: TSH, T4. We will treat the hyponatremia with Fluid restriction of:  1.5 liter per day. The patient's sodium results have been reviewed and are listed below.  Recent Labs   Lab  03/07/24  0449   *         Acute on chronic combined systolic and diastolic heart failure  With elevated BNP, not in acute decompensation  On lasix, volume removal with PD  Daily weight  I&Os      Hyperlipidemia associated with type 2 diabetes mellitus  Cont statin      Weakness  Likely from age related debility  Will consult PT  Encourage mobilization      COPD (chronic obstructive pulmonary disease)  Patient's COPD is controlled currently.  Patient is currently off COPD Pathway. Continue scheduled inhalers Antibiotics and Supplemental oxygen and monitor respiratory status closely.     Iron deficiency anemia  Patient's anemia is currently controlled. Has not received any PRBCs to date. Etiology likely d/t chronic disease due to Chronic Kidney Disease/ESRD  Current CBC reviewed-   Lab Results   Component Value Date    HGB 7.6 (L) 03/08/2024    HCT 24.0 (L) 03/08/2024     Monitor serial CBC and transfuse if patient becomes hemodynamically unstable, symptomatic or H/H drops below 7/21.    Type 2 diabetes mellitus with stage 4 chronic kidney disease and hypertension  Creatine stable for now. BMP reviewed- noted Estimated Creatinine Clearance: 6.7 mL/min (A) (based on SCr of 5.7 mg/dL (H)). according to latest data. 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  Monitor UOP and serial BMP and adjust therapy as needed. Renally dose meds. Avoid nephrotoxic medications and procedures.    Coronary artery disease involving native coronary artery without angina pectoris - Non-obstructive 50% LAD  Patient with known CAD s/p  pacemaker and stent placement, which is controlled Will continue ASA and monitor for S/Sx of angina/ACS. Continue to monitor on telemetry.       VTE Risk Mitigation (From admission, onward)           Ordered     heparin (porcine) injection 5,000 Units  Every 8 hours         03/04/24 0601     IP VTE HIGH RISK PATIENT  Once         03/04/24 0601     Place sequential compression device  Until  discontinued         03/04/24 0601                    Discharge Planning   ALIX:      Code Status: DNR   Is the patient medically ready for discharge?:     Reason for patient still in hospital (select all that apply): Patient trending condition, Treatment, and Consult recommendations  Discharge Plan A: Home with family, Home Health          Charlotte Villalobos MD  Department of Hospital Medicine   [facility]

## 2024-03-08 NOTE — PT/OT/SLP PROGRESS
"Physical Therapy Treatment    Patient Name:  Myesha Quinones   MRN:  [id number]    Recommendations:     Discharge Recommendations: Moderate Intensity Therapy  Discharge Equipment Recommendations: none  Barriers to discharge: Decreased caregiver support    Assessment:     Myesha Quinones is a 83 y.o. female admitted with a medical diagnosis of Community acquired pneumonia.  She presents with the following impairments/functional limitations: weakness, impaired functional mobility, decreased safety awareness, impaired cardiopulmonary response to activity, impaired endurance, gait instability, impaired balance, impaired self care skills, decreased lower extremity function, edema     Patient participated in physical therapy session on this date.  Patient presents up in chair, agreeable to therapy.  Patient is making good progress toward PT goals.  Patient will benefit from moderate intensity therapy at discharge.  Full report to follow.    Rehab Prognosis: Good; patient would benefit from acute skilled PT services to address these deficits and reach maximum level of function.    Recent Surgery: * No surgery found *      Plan:     During this hospitalization, patient to be seen 5 x/week to address the identified rehab impairments via gait training, therapeutic activities, therapeutic exercises, neuromuscular re-education and progress toward the following goals:    Plan of Care Expires:  04/03/24    Subjective     Chief Complaint: "I am feeling a bit better"  Patient/Family Comments/goals: to return to PLOF  Pain/Comfort:  Pain Rating 1: 0/10  Pain Rating Post-Intervention 1: 0/10      Objective:     Communicated with nurse Tse prior to session.  Patient found up in chair with bed alarm, peripheral IV, oxygen, telemetry upon PT entry to room.     General Precautions: Standard, fall, hearing impaired  Orthopedic Precautions: N/A  Braces: N/A  Respiratory Status: Nasal cannula, flow 2 L/min     Functional " Mobility:  Transfers:     Sit to Stand:  contact guard assistance with rolling walker  Gait: with RW and CGA x 90', slow pace, 1 standing rest required, supplemental oxygen in tow  Balance: Seated:  no LOB, SBA    Standing: requires RW, CGA      AM-PAC 6 CLICK MOBILITY  Turning over in bed (including adjusting bedclothes, sheets and blankets)?: 3  Sitting down on and standing up from a chair with arms (e.g., wheelchair, bedside commode, etc.): 3  Moving from lying on back to sitting on the side of the bed?: 3  Moving to and from a bed to a chair (including a wheelchair)?: 3  Need to walk in hospital room?: 3  Climbing 3-5 steps with a railing?: 1  Basic Mobility Total Score: 16       Treatment & Education:  Patient agreeable to therapy.  Patient performs above gait trial, tolerates well, requires 1 standing rest break.  Patient returns to sitting in bedside chair, performs B LE exercises:  AP, LAQ, Hip flexion, Hip AB/AD 2 x 10 reps.      Patient left up in chair with all lines intact, call button in reach, chair alarm on, and nurse notified..    GOALS:   Multidisciplinary Problems       Physical Therapy Goals          Problem: Physical Therapy    Goal Priority Disciplines Outcome Goal Variances Interventions   Physical Therapy Goal     PT, PT/OT Ongoing, Progressing     Description: Goals to be met by: 4/3/2024     Patient will increase functional independence with mobility by performin. Supine to sit with Modified [place]  2. Sit to supine with Modified [place]  3. Sit to stand transfer with Stand-by Assistance  4. Bed to chair transfer with Stand-by Assistance using Rolling Walker  5. Gait  x 150 feet with Stand-by Assistance using Rolling Walker.                          Time Tracking:     PT Received On: 24  PT Start Time: 1216     PT Stop Time: 1241  PT Total Time (min): 25 min     Billable Minutes: Gait Training 15 and Therapeutic Exercise 10    Treatment Type: Treatment  PT/PTA: PT      Number of PTA visits since last PT visit: 0     03/08/2024

## 2024-03-08 NOTE — ASSESSMENT & PLAN NOTE
Patient's anemia is currently controlled. Has not received any PRBCs to date. Etiology likely d/t chronic disease due to Chronic Kidney Disease/ESRD  Current CBC reviewed-   Lab Results   Component Value Date    HGB 7.6 (L) 03/08/2024    HCT 24.0 (L) 03/08/2024     Monitor serial CBC and transfuse if patient becomes hemodynamically unstable, symptomatic or H/H drops below 7/21.

## 2024-03-08 NOTE — NURSING
Pt blood sugar 69. Pt Asymptomatic. Pt given Glucose chewables 16 g. Blood sugar rechecked-111. Jeane notified and would like to hold all insulin for now to adjust dose.

## 2024-03-08 NOTE — PROGRESS NOTES
Patient seen.  Labs and vitals reviewed.  /70.  PD/  cc.  Continue PD.  Discussed with dialysis nurse.

## 2024-03-08 NOTE — NURSING
RAPID RESPONSE NURSE PROACTIVE ROUNDING NOTE       Time of Visit: 1705    Admit Date: 3/4/2024  LOS: 2  Code Status: Full Code   Date of Visit: 2024  : 1940  Age: 83 y.o.  Sex: female  Race: White  Bed: K477/K477 A:   MRN: [id number]  Was the patient discharged from an ICU this admission? No   Was the patient discharged from a PACU within last 24 hours? No   Did the patient receive conscious sedation/general anesthesia in last 24 hours? No   Was the patient in the ED within the past 24 hours? No   Was the patient on NIPPV within the past 24 hours? No   Attending Physician: Charlotte Villalobos MD  Primary Service: Hospitalist,Internal Medicine   Time spent at the bedside: < 15 min    SITUATION    Notified by Epic patient alert  Reason for alert: thoracentesis     Diagnosis: Community acquired pneumonia   has a past medical history of Acute encephalopathy, Acute hypoxemic respiratory failure, Acute right-sided thoracic back pain, Allergy, Asthma, Basal cell carcinoma, Basal cell carcinoma, Basal cell carcinoma, Basal cell carcinoma, Basal cell carcinoma, BCC (basal cell carcinoma of skin), Bilateral pleural effusion, Bilateral renal cysts, Breast cancer, CAD (coronary artery disease), Cardiomyopathy, Cardiomyopathy, ischemic, Cataract, Chest pain, CHF (congestive heart failure), CKD (chronic kidney disease) stage 4, GFR 15-29 ml/min, Colon polyp, Controlled type 2 diabetes mellitus 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, with long-term current use of insulin, COPD (chronic obstructive pulmonary disease), COPD exacerbation, Current mild episode of major depressive disorder without prior episode, Defibrillator discharge, Dependence on renal dialysis, Diabetes mellitus, Diabetes mellitus type II, Diabetes with neurologic complications, Edema, ESRD needing dialysis, Goiter, Hematuria, unspecified, breast cancer, Hyperlipidemia, Hypertension, Hypocalcemia, Hypokalemia,  Hyponatremia, Hyponatremia, Iron deficiency anemia, Iron deficiency anemia, Iron deficiency anemia, Left kidney mass, Meningioma, Microalbuminuria due to type 2 diabetes mellitus, Osteoporosis, postmenopausal, Pneumonia, Postinflammatory pulmonary fibrosis, Proteinuria, Pseudomonas pneumonia, SCC (squamous cell carcinoma), Skin cancer, Sleep apnea, Squamous cell carcinoma, Unspecified vitamin D deficiency, UTI (urinary tract infection), Ventricular tachycardia, Vitamin B12 deficiency, and Vitamin D deficiency disease.    Last Vitals:  Temp: 97.7 °F (36.5 °C) (03/07 1613)  Pulse: 79 (03/07 1613)  Resp: 18 (03/07 1613)  BP: 136/60 (03/07 1613)  SpO2: 96 % (03/07 1613)    24 Hour Vitals Range:  Temp:  [97.4 °F (36.3 °C)-98 °F (36.7 °C)]   Pulse:  [67-87]   Resp:  [18]   BP: (111-140)/(53-68)   SpO2:  [92 %-98 %]     Clinical Issues: Respiratory    ASSESSMENT/INTERVENTIONS    PT aox4, breathsounds clear.    RECOMMENDATIONS  N/a    Discussed plan of care with       PROVIDER ESCALATION    Physician escalation: No    Orders received and case discussed with NA.    Disposition:Remain in room 477    FOLLOW UP    Call back the Rapid Response Nurse, Danae Ram at [phone] for additional questions or concerns.

## 2024-03-08 NOTE — ASSESSMENT & PLAN NOTE
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 Will replace calcium and monitor electrolytes closely. The latest calcium labs have been reviewed and are listed below.  Recent Labs   Lab 03/08/24  0247   CALCIUM 6.6*         No results for input(s): "CAION" in the last 24 hours.    Calcium supplement    "

## 2024-03-08 NOTE — ASSESSMENT & PLAN NOTE
Suspected toxic/ metabolic etiology - mentation improving but continues to have involuntary periodic jerking of bilateral UE   2/2? Infectious vs other, ?uremia. No culprit medications noted    Avoid sedating medications  Ammonia levels elevated, ?etiology, add lactulose as it is a relatively benign medication & may help  CPAP at bedtime/ naps    Neurology consult for involuntary jerks

## 2024-03-08 NOTE — PLAN OF CARE
Problem: Occupational Therapy  Goal: Occupational Therapy Goal  Description: Goals to be met by: 4/4/23     Patient will increase functional independence with ADLs by performing:    LE Dressing with Modified [place].  Grooming while standing with Modified [place].  Toileting from toilet with Modified [place] for hygiene and clothing management.   Supine to sit with Modified [place].  Step transfer with Modified [place]  Toilet transfer to toilet with Modified [place].  Increased functional strength to WFL for self care skills and functional mobility.  Upper extremity exercise program x10 reps per handout, with independence.    Outcome: Ongoing, Progressing   Myesha Quinones is a 83 y.o. female with a medical diagnosis of Community acquired pneumonia.  Performance deficits affecting function are weakness, impaired endurance, impaired self care skills, impaired functional mobility, gait instability, impaired balance, decreased upper extremity function, decreased lower extremity function, pain, impaired coordination, impaired fine motor, impaired skin, edema.    Pt found in bed, agreeable to therapy.  Pt improving daily. Continues with involuntary jerks however improving. Continue OT services to address functional goals, progressing as able.

## 2024-03-09 LAB
AMMONIA PLAS-SCNC: 50 UMOL/L (ref 10–50)
ANION GAP SERPL CALC-SCNC: 21 MMOL/L (ref 8–16)
BACTERIA BLD CULT: NORMAL
BACTERIA BLD CULT: NORMAL
BUN SERPL-MCNC: 52 MG/DL (ref 8–23)
CALCIUM SERPL-MCNC: 7 MG/DL (ref 8.7–10.5)
CHLORIDE SERPL-SCNC: 94 MMOL/L (ref 95–110)
CO2 SERPL-SCNC: 22 MMOL/L (ref 23–29)
CREAT SERPL-MCNC: 5.2 MG/DL (ref 0.5–1.4)
EST. GFR  (NO RACE VARIABLE): 8 ML/MIN/1.73 M^2
GLUCOSE SERPL-MCNC: 199 MG/DL (ref 70–110)
POCT GLUCOSE: 168 MG/DL (ref 70–110)
POCT GLUCOSE: 184 MG/DL (ref 70–110)
POCT GLUCOSE: 217 MG/DL (ref 70–110)
POCT GLUCOSE: 303 MG/DL (ref 70–110)
POTASSIUM SERPL-SCNC: 3.2 MMOL/L (ref 3.5–5.1)
SODIUM SERPL-SCNC: 137 MMOL/L (ref 136–145)

## 2024-03-09 PROCEDURE — 36415 COLL VENOUS BLD VENIPUNCTURE: CPT | Performed by: STUDENT IN AN ORGANIZED HEALTH CARE EDUCATION/TRAINING PROGRAM

## 2024-03-09 PROCEDURE — 25000003 PHARM REV CODE 250: Performed by: FAMILY MEDICINE

## 2024-03-09 PROCEDURE — 63600175 PHARM REV CODE 636 W HCPCS

## 2024-03-09 PROCEDURE — 27100171 HC OXYGEN HIGH FLOW UP TO 24 HOURS

## 2024-03-09 PROCEDURE — 25000003 PHARM REV CODE 250: Performed by: INTERNAL MEDICINE

## 2024-03-09 PROCEDURE — 25000003 PHARM REV CODE 250: Performed by: STUDENT IN AN ORGANIZED HEALTH CARE EDUCATION/TRAINING PROGRAM

## 2024-03-09 PROCEDURE — 94761 N-INVAS EAR/PLS OXIMETRY MLT: CPT

## 2024-03-09 PROCEDURE — 82140 ASSAY OF AMMONIA: CPT | Performed by: INTERNAL MEDICINE

## 2024-03-09 PROCEDURE — 63600175 PHARM REV CODE 636 W HCPCS: Performed by: FAMILY MEDICINE

## 2024-03-09 PROCEDURE — 25000003 PHARM REV CODE 250: Performed by: HOSPITALIST

## 2024-03-09 PROCEDURE — 80048 BASIC METABOLIC PNL TOTAL CA: CPT | Performed by: STUDENT IN AN ORGANIZED HEALTH CARE EDUCATION/TRAINING PROGRAM

## 2024-03-09 PROCEDURE — 90945 DIALYSIS ONE EVALUATION: CPT

## 2024-03-09 PROCEDURE — 94660 CPAP INITIATION&MGMT: CPT

## 2024-03-09 PROCEDURE — 94799 UNLISTED PULMONARY SVC/PX: CPT | Mod: XB

## 2024-03-09 PROCEDURE — 63600175 PHARM REV CODE 636 W HCPCS: Performed by: HOSPITALIST

## 2024-03-09 PROCEDURE — 99900035 HC TECH TIME PER 15 MIN (STAT)

## 2024-03-09 PROCEDURE — 11000001 HC ACUTE MED/SURG PRIVATE ROOM

## 2024-03-09 RX ORDER — LANOLIN ALCOHOL/MO/W.PET/CERES
400 CREAM (GRAM) TOPICAL ONCE
Status: COMPLETED | OUTPATIENT
Start: 2024-03-09 | End: 2024-03-09

## 2024-03-09 RX ORDER — POTASSIUM CHLORIDE 20 MEQ/1
20 TABLET, EXTENDED RELEASE ORAL ONCE
Status: COMPLETED | OUTPATIENT
Start: 2024-03-09 | End: 2024-03-09

## 2024-03-09 RX ADMIN — SODIUM BICARBONATE 650 MG: 650 TABLET ORAL at 10:03

## 2024-03-09 RX ADMIN — PIPERACILLIN AND TAZOBACTAM 4.5 G: 4; .5 INJECTION, POWDER, LYOPHILIZED, FOR SOLUTION INTRAVENOUS; PARENTERAL at 09:03

## 2024-03-09 RX ADMIN — FUROSEMIDE 60 MG: 10 INJECTION, SOLUTION INTRAMUSCULAR; INTRAVENOUS at 10:03

## 2024-03-09 RX ADMIN — POTASSIUM BICARBONATE 25 MEQ: 978 TABLET, EFFERVESCENT ORAL at 04:03

## 2024-03-09 RX ADMIN — SODIUM BICARBONATE 650 MG: 650 TABLET ORAL at 04:03

## 2024-03-09 RX ADMIN — CARVEDILOL 3.12 MG: 3.12 TABLET, FILM COATED ORAL at 10:03

## 2024-03-09 RX ADMIN — INSULIN ASPART 4 UNITS: 100 INJECTION, SOLUTION INTRAVENOUS; SUBCUTANEOUS at 11:03

## 2024-03-09 RX ADMIN — LIDOCAINE 5% 1 PATCH: 700 PATCH TOPICAL at 10:03

## 2024-03-09 RX ADMIN — GUAIFENESIN 600 MG: 600 TABLET, EXTENDED RELEASE ORAL at 10:03

## 2024-03-09 RX ADMIN — SEVELAMER CARBONATE 1600 MG: 800 TABLET, FILM COATED ORAL at 09:03

## 2024-03-09 RX ADMIN — MUPIROCIN: 20 OINTMENT TOPICAL at 10:03

## 2024-03-09 RX ADMIN — INSULIN DETEMIR 8 UNITS: 100 INJECTION, SOLUTION SUBCUTANEOUS at 09:03

## 2024-03-09 RX ADMIN — GUAIFENESIN 600 MG: 600 TABLET, EXTENDED RELEASE ORAL at 09:03

## 2024-03-09 RX ADMIN — SEVELAMER CARBONATE 1600 MG: 800 TABLET, FILM COATED ORAL at 12:03

## 2024-03-09 RX ADMIN — HEPARIN SODIUM 5000 UNITS: 5000 INJECTION INTRAVENOUS; SUBCUTANEOUS at 09:03

## 2024-03-09 RX ADMIN — ASPIRIN 81 MG CHEWABLE TABLET 81 MG: 81 TABLET CHEWABLE at 10:03

## 2024-03-09 RX ADMIN — HEPARIN SODIUM 5000 UNITS: 5000 INJECTION INTRAVENOUS; SUBCUTANEOUS at 04:03

## 2024-03-09 RX ADMIN — PIPERACILLIN AND TAZOBACTAM 4.5 G: 4; .5 INJECTION, POWDER, LYOPHILIZED, FOR SOLUTION INTRAVENOUS; PARENTERAL at 10:03

## 2024-03-09 RX ADMIN — MAGNESIUM OXIDE TAB 400 MG (241.3 MG ELEMENTAL MG) 400 MG: 400 (241.3 MG) TAB at 04:03

## 2024-03-09 RX ADMIN — FUROSEMIDE 60 MG: 10 INJECTION, SOLUTION INTRAMUSCULAR; INTRAVENOUS at 09:03

## 2024-03-09 RX ADMIN — CHOLECALCIFEROL TAB 25 MCG (1000 UNIT) 2000 UNITS: 25 TAB at 10:03

## 2024-03-09 RX ADMIN — MUPIROCIN: 20 OINTMENT TOPICAL at 09:03

## 2024-03-09 RX ADMIN — SEVELAMER CARBONATE 1600 MG: 800 TABLET, FILM COATED ORAL at 05:03

## 2024-03-09 RX ADMIN — SODIUM BICARBONATE 650 MG: 650 TABLET ORAL at 12:03

## 2024-03-09 RX ADMIN — LACTULOSE 10 G: 20 SOLUTION ORAL at 10:03

## 2024-03-09 RX ADMIN — POTASSIUM CHLORIDE 20 MEQ: 1500 TABLET, EXTENDED RELEASE ORAL at 12:03

## 2024-03-09 RX ADMIN — HEPARIN SODIUM 5000 UNITS: 5000 INJECTION INTRAVENOUS; SUBCUTANEOUS at 05:03

## 2024-03-09 RX ADMIN — SEVELAMER CARBONATE 1600 MG: 800 TABLET, FILM COATED ORAL at 10:03

## 2024-03-09 RX ADMIN — SODIUM BICARBONATE 650 MG: 650 TABLET ORAL at 09:03

## 2024-03-09 RX ADMIN — CARVEDILOL 3.12 MG: 3.12 TABLET, FILM COATED ORAL at 09:03

## 2024-03-09 RX ADMIN — Medication 100 MCG: at 10:03

## 2024-03-09 RX ADMIN — INSULIN ASPART 2 UNITS: 100 INJECTION, SOLUTION INTRAVENOUS; SUBCUTANEOUS at 04:03

## 2024-03-09 NOTE — PROGRESS NOTES
[facility] Medicine  Progress Note    Patient Name: Myesha Quinones  MRN: [id number]  Patient Class: IP- Inpatient   Admission Date: 3/4/2024  Length of Stay: 4 days  Attending Physician: Charlotte Villalobos MD  Primary Care Provider: Dayton Michael MD        Subjective:     Principal Problem:Community acquired pneumonia        HPI:  83-year-old woman with ESRD on PD, type 2 diabetes, hypertension, asthma/COPD, HLD, CAD breast CA presented to the ER for notion of CP. Patient was jus recently admitted to the hospital for metabolic encephalopathy and discharge one day ago.  Per family since discharge she was not herself and had multiple complaints, with difficulty sleeping, decrease appetite, fatigue, generalized weakness. The night of the admission she wake up with left sided chest pain, type pressure, radiating to her back, aggravated by deep breathing, no relieving factors, associated with SOB. At home he BS was high around 250 per family. She was given 15 units of lantus. She was brought to ED. In the ED she was found to have hypoglycemia, as well as elevated BP and decreased oxygen saturation. When seen in the ER, she was moaning with severe CP, in mild to moderate distress, O2 sat aroung 96% on 3L oxygen. Labs in the ER with elevated troponin 0.034, , H/H 8.2/23.9, low calcium,elevated ALT 49, high PTH intact 541.6, low sodium 132. CXR There is bilateral pattern of interstitial and patchy alveolar infiltrates which has been progressing compare to previous admission. She has been admitted for HCAP and R/O ACS.    Overview/Hospital Course:  No notes on file    Interval History: doing better overall. Frequency of jerking decreased.      Review of Systems  Objective:     Vital Signs (Most Recent):  Temp: 98 °F (36.7 °C) (03/09/24 1630)  Pulse: 91 (03/09/24 1642)  Resp: 18 (03/09/24 1630)  BP: 135/60 (03/09/24 1630)  SpO2: 99 % (03/09/24 1630) Vital Signs (24h Range):  Temp:  [97.8 °F (36.6  °C)-99.4 °F (37.4 °C)] 98 °F (36.7 °C)  Pulse:  [63-93] 91  Resp:  [17-20] 18  SpO2:  [95 %-100 %] 99 %  BP: (135-179)/(60-78) 135/60     Weight: 65.8 kg (145 lb 1 oz)  Body mass index is 25.7 kg/m².    Intake/Output Summary (Last 24 hours) at 3/9/2024 1728  Last data filed at 3/9/2024 0900  Gross per 24 hour   Intake 415 ml   Output 605 ml   Net -190 ml           Physical Exam  Vitals and nursing note reviewed.   Constitutional:       General: She is not in acute distress.     Comments: elderly tired appearing female   Cardiovascular:      Rate and Rhythm: Normal rate and regular rhythm.      Heart sounds: Normal heart sounds. No murmur heard.  Pulmonary:      Effort: Pulmonary effort is normal. No respiratory distress.   Abdominal:      Palpations: Abdomen is soft.      Tenderness: There is no abdominal tenderness.      Comments: PD catheter in place   Musculoskeletal:      Right lower leg: No edema.      Left lower leg: No edema.   Skin:     General: Skin is warm and dry.      Findings: No bruising.   Neurological:      General: No focal deficit present.      Mental Status: She is alert.      Comments: Periodic involuntary jerking movements less frequent             Significant Labs: All pertinent labs within the past 24 hours have been reviewed.    Significant Imaging: I have reviewed all pertinent imaging results/findings within the past 24 hours.    Assessment/Plan:      * Community acquired pneumonia  Recently discharged from the hospital and readmitted with abnormal CXR  Continue zosyn, azithromycin, vancomycin   Blood culture NGTD    Associated with a uncomplicated parapneumonic effusion     On chart review, prior h/o ABPA, ?MAC positive sputum cultures. Unclear if patient received complete treatment.     Pulmonary consult - recommendations reviewed   S/p bedside thoracentesis on 3/6 - fluid studies suggestive of exudative effusion, cultures/ cytology pending     Sputum culture growing pseudomonas, continue  "zosyn while inpatient, switch to PO at DC to complete 4-6 weeks of antibiotics per pulm recommendations      Acute encephalopathy  Suspected toxic/ metabolic etiology - mentation improving but continues to have involuntary periodic jerking of bilateral UE   2/2? Infectious vs other, ?uremia. No culprit medications noted    Avoid sedating medications  Ammonia levels elevated, ?etiology, add lactulose as it is a relatively benign medication & may help  CPAP at bedtime/ naps    Neurology consult for involuntary jerks - recommendations noted     Pleurodynia  ?in setting of pneumonia, pain improved with lidocaine patch       ALT (SGPT) level raised  Unclear etiology, possible in setting of infection   Repeat LFTs, consider further testing if LFT's continue to worsen      Troponin level elevated  With chest pain  Mild changes on 12 lead EKG  R/O ACS  Last 2D echo 3/1/24 Left Ventricle: The left ventricle is normal in size. There is concentric remodeling. Septal motion is consistent with pacing. There is reduced systolic function. Biplane (2D) method of discs ejection fraction is 52%.   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 Will replace calcium and monitor electrolytes closely. The latest calcium labs have been reviewed and are listed below.  Recent Labs   Lab 03/09/24  0247   CALCIUM 7.0*         No results for input(s): "CAION" in the last 24 hours.    Calcium supplement      ESRD on peritoneal dialysis  Resume PD  Nephrology consult  Monitor electrolytes      Acute hypoxemic respiratory failure  Patient with Hypoxic Respiratory failure which is Acute.  she is not on home oxygen. Supplemental oxygen was provided and noted-      .   Signs/symptoms of respiratory failure include- tachypnea, " increased work of breathing, and lethargy. Contributing diagnoses includes - Pneumonia Labs and images were reviewed. Patient Has not had a recent ABG. Will treat underlying causes and adjust management of respiratory failure as follows- IV antibiotics     On 2 L oxygen NC. Wean as tolerated.     Essential hypertension  Chronic, controlled. Latest blood pressure and vitals reviewed-     Temp:  [97.2 °F (36.2 °C)-98.4 °F (36.9 °C)]   Pulse:  [71-94]   Resp:  [16-20]   BP: (102-131)/(50-65)   SpO2:  [92 %-97 %] .   Home meds for hypertension were reviewed and noted below.   Hypertension Medications               carvediloL (COREG) 25 MG tablet TAKE 2 TABLETS (50 MG TOTAL) BY MOUTH 2 (TWO) TIMES DAILY.    furosemide (LASIX) 40 MG tablet Take 1 tablet (40 mg total) by mouth once daily.    hydrALAZINE (APRESOLINE) 100 MG tablet Take 1 tablet (100 mg total) by mouth 2 (two) times daily.    nitroGLYCERIN (NITROSTAT) 0.4 MG SL tablet Place 0.4 mg under the tongue every 5 (five) minutes as needed for Chest pain.     valsartan (DIOVAN) 160 MG tablet Take 160 mg by mouth once daily.            While in the hospital, will manage blood pressure as follows; Adjust home antihypertensive regimen as follows- holding all antihypertensives due to borderline low BP, restart as necessary    Will utilize p.r.n. blood pressure medication only if patient's blood pressure greater than 180/110 and she develops symptoms such as worsening chest pain or shortness of breath.    Hyponatremia  Patient has hyponatremia which is controlled,We will aim to correct the sodium by 4-6mEq in 24 hours. We will monitor sodium Daily. The hyponatremia is due to Dehydration/hypovolemia, Heart Failure, and renal insufficiency. We will obtain the following studies: TSH, T4. We will treat the hyponatremia with Fluid restriction of:  1.5 liter per day. The patient's sodium results have been reviewed and are listed below.  Recent Labs   Lab 03/09/24  0247             Acute on chronic combined systolic and diastolic heart failure  With elevated BNP, not in acute decompensation  On lasix, volume removal with PD  Daily weight  I&Os      Hyperlipidemia associated with type 2 diabetes mellitus  Cont statin      Weakness  Likely from age related debility  Will consult PT  Encourage mobilization      COPD (chronic obstructive pulmonary disease)  Patient's COPD is controlled currently.  Patient is currently off COPD Pathway. Continue scheduled inhalers Antibiotics and Supplemental oxygen and monitor respiratory status closely.     Iron deficiency anemia  Patient's anemia is currently controlled. Has not received any PRBCs to date. Etiology likely d/t chronic disease due to Chronic Kidney Disease/ESRD  Current CBC reviewed-   Lab Results   Component Value Date    HGB 7.6 (L) 03/08/2024    HCT 24.0 (L) 03/08/2024     Monitor serial CBC and transfuse if patient becomes hemodynamically unstable, symptomatic or H/H drops below 7/21.    Type 2 diabetes mellitus with stage 4 chronic kidney disease and hypertension  Creatine stable for now. BMP reviewed- noted Estimated Creatinine Clearance: 6.7 mL/min (A) (based on SCr of 5.7 mg/dL (H)). according to latest data. 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  Monitor UOP and serial BMP and adjust therapy as needed. Renally dose meds. Avoid nephrotoxic medications and procedures.    Coronary artery disease involving native coronary artery without angina pectoris - Non-obstructive 50% LAD  Patient with known CAD s/p  pacemaker and stent placement, which is controlled Will continue ASA and monitor for S/Sx of angina/ACS. Continue to monitor on telemetry.       VTE Risk Mitigation (From admission, onward)           Ordered     heparin (porcine) injection 5,000 Units  Every 8 hours         03/04/24 0601     IP VTE HIGH RISK PATIENT  Once         03/04/24 0601     Place sequential compression device  Until discontinued         03/04/24 0601                     Discharge Planning   ALIX:      Code Status: DNR   Is the patient medically ready for discharge?:     Reason for patient still in hospital (select all that apply): Patient trending condition, Treatment, and Consult recommendations  Discharge Plan A: Home with family, Home Health          Charlotte Villalobos MD  Department of [facility] Medicine   [facility]

## 2024-03-09 NOTE — PROGRESS NOTES
03/08/24 1800        Peritoneal Dialysis Catheter 04/25/23 0930 Left lower abdomen   Placement Date/Time: 04/25/23 0930   Present Prior to Hospital Arrival?: No  Inserted by: MD  Catheter Location: Left lower abdomen   Site Assessment Clean;Dry;Intact;No redness;No swelling   Dressing Intervention Sterile dressing change   Status Accessed   Dressing Status Clean;Dry;Intact   Dressing Gauze   Securement secured to abdomen w/ adhesive device   Clamp Status unclamped   Peritoneal Dialysis   Exchange Type Cycler   Peritoneal Treatment Status Started   Dianeal Solution Dextrose 1.5% in 2000 mL;Dextrose 1.5% in 6000 mL   Cycler Peritoneal Dialysis   Number of Cycles 5   Fill Volume (mL) 2400 mL   Total Volume (mL) 1200 mL   Dwell Time (specify hr/min) 1.27   Cycler Treatment Comments CCPD connected. Tx initiated without complication. I-drain into first fill observed. Take off/capping procedure education with understanding verbalized by patient and family. Video made reviewing takeoff procedure for patient's daughter to be educated as well. No further questions at this tiem.

## 2024-03-09 NOTE — SUBJECTIVE & OBJECTIVE
Interval History: doing better overall. Frequency of jerking decreased.      Review of Systems  Objective:     Vital Signs (Most Recent):  Temp: 98 °F (36.7 °C) (03/09/24 1630)  Pulse: 91 (03/09/24 1642)  Resp: 18 (03/09/24 1630)  BP: 135/60 (03/09/24 1630)  SpO2: 99 % (03/09/24 1630) Vital Signs (24h Range):  Temp:  [97.8 °F (36.6 °C)-99.4 °F (37.4 °C)] 98 °F (36.7 °C)  Pulse:  [63-93] 91  Resp:  [17-20] 18  SpO2:  [95 %-100 %] 99 %  BP: (135-179)/(60-78) 135/60     Weight: 65.8 kg (145 lb 1 oz)  Body mass index is 25.7 kg/m².    Intake/Output Summary (Last 24 hours) at 3/9/2024 1728  Last data filed at 3/9/2024 0900  Gross per 24 hour   Intake 415 ml   Output 605 ml   Net -190 ml           Physical Exam  Vitals and nursing note reviewed.   Constitutional:       General: She is not in acute distress.     Comments: elderly tired appearing female   Cardiovascular:      Rate and Rhythm: Normal rate and regular rhythm.      Heart sounds: Normal heart sounds. No murmur heard.  Pulmonary:      Effort: Pulmonary effort is normal. No respiratory distress.   Abdominal:      Palpations: Abdomen is soft.      Tenderness: There is no abdominal tenderness.      Comments: PD catheter in place   Musculoskeletal:      Right lower leg: No edema.      Left lower leg: No edema.   Skin:     General: Skin is warm and dry.      Findings: No bruising.   Neurological:      General: No focal deficit present.      Mental Status: She is alert.      Comments: Periodic involuntary jerking movements less frequent             Significant Labs: All pertinent labs within the past 24 hours have been reviewed.    Significant Imaging: I have reviewed all pertinent imaging results/findings within the past 24 hours.

## 2024-03-09 NOTE — CONSULTS
NEUROLOGY FLOOR CONSULT    Reason for consult: Involuntary twitching    CC:  Twitching of the upper extremities    HPI:    83 year old female with PMH of ESRD on PD, type 2 diabetes, hypertension, asthma/COPD, HLD, CAD breast CA presented to the ER for notion of CP. Patient was admitted to the hospital for metabolic encephalopathy. At the time  of admit, patient was confused with elevated blood glucose. She also complained of chest pain for which has resolved now. She has been treated and now very responsive. Neurology was consulted given new bilateral upper extremity twitching.Patient with no history of seizure. On evaluation, patient following command, deny any weakness, numbness, headache, blurry vision.     ROS: As per HPI    Histories:     Allergies:  Iodine and iodide containing products and Nifedipine    Current Medications:    Current Facility-Administered Medications   Medication Dose Route Frequency Provider Last Rate Last Admin    acetaminophen tablet 650 mg  650 mg Oral Q4H PRN Sid Cárdenas MD   650 mg at 03/06/24 1310    albuterol inhaler 2 puff  2 puff Inhalation Q6H PRN Sid Cárdenas MD        albuterol-ipratropium 2.5 mg-0.5 mg/3 mL nebulizer solution 3 mL  3 mL Nebulization Q4H PRN Sid Cárdenas MD        aspirin chewable tablet 81 mg  81 mg Oral Daily Sid Cárdenas MD   81 mg at 03/09/24 1043    carvediloL tablet 3.125 mg  3.125 mg Oral BID Charlotte Villalobos MD   3.125 mg at 03/09/24 1043    cyanocobalamin tablet 100 mcg  100 mcg Oral Daily Sid Cárdenas MD   100 mcg at 03/09/24 1043    dextrose 10% bolus 125 mL 125 mL  12.5 g Intravenous PRN Sid Cárdenas MD        dextrose 10% bolus 125 mL 125 mL  12.5 g Intravenous PRN Betsy Enrique NP        dextrose 10% bolus 250 mL 250 mL  25 g Intravenous PRN Sid Cárdenas MD   Stopped at 03/04/24 0535    dextrose 10% bolus 250 mL 250 mL  25 g Intravenous PRN Betsy Enrique NP        epoetin [place]-epbx injection 10,000 Units  10,000  Units Subcutaneous Once Hayley Morocho MD        ergocalciferol capsule 50,000 Units  50,000 Units Oral Q7 Days Hayley Morocho MD   50,000 Units at 03/05/24 1316    fluticasone propionate 50 mcg/actuation nasal spray 50 mcg  1 spray Each Nostril Daily PRN Sid Cárdenas MD        furosemide injection 60 mg  60 mg Intravenous Q12H Bianca Miramontes MD   60 mg at 03/09/24 1043    glucagon (human recombinant) injection 1 mg  1 mg Intramuscular PRN Sid Cárdenas MD        glucagon (human recombinant) injection 1 mg  1 mg Intramuscular PRN Betsy Enrique, NP        glucose chewable tablet 16 g  16 g Oral PRN Sid Cárdenas MD        glucose chewable tablet 16 g  16 g Oral PRN Betsy Enrique NP   16 g at 03/08/24 0614    glucose chewable tablet 24 g  24 g Oral PRN Sid Cárdenas MD        glucose chewable tablet 24 g  24 g Oral PRN Betsy Enrique, SHELBI        guaiFENesin 12 hr tablet 600 mg  600 mg Oral BID Charlotte Villalobos MD   600 mg at 03/09/24 1043    heparin (porcine) injection 5,000 Units  5,000 Units Subcutaneous Q8H Sid Cárdenas MD   5,000 Units at 03/09/24 0540    hydrOXYzine HCL tablet 10 mg  10 mg Oral TID PRN Charlotte Villalobos MD        insulin aspart U-100 pen 0-5 Units  0-5 Units Subcutaneous QID (AC + HS) PRN Betsy Enrique NP   4 Units at 03/09/24 1152    insulin detemir U-100 (Levemir) pen 8 Units  8 Units Subcutaneous QHS Charlotte Villalobos MD   8 Units at 03/07/24 2106    lactulose 20 gram/30 mL solution Soln 10 g  10 g Oral Daily Charlotte Villalobos MD        LIDOcaine 5 % patch 1 patch  1 patch Transdermal Q24H Charlotte Villalobos MD   1 patch at 03/09/24 1041    LIDOcaine 5 % patch 1 patch  1 patch Transdermal Q24H Charlotte Villalobos MD   1 patch at 03/06/24 2305    LIDOcaine viscous HCl 2% oral solution 15 mL  15 mL Oral Once Danish Reyes MD        mupirocin 2 % ointment   Nasal BID Charlotte Villalobos MD   Given at 03/09/24 1043    naloxone 0.4 mg/mL injection 0.02 mg  0.02 mg Intravenous  PRN Sid Cárdenas MD        nitroGLYCERIN SL tablet 0.4 mg  0.4 mg Sublingual Q5 Min PRN Sid Cárdenas MD   0.4 mg at 03/04/24 0647    ondansetron injection 4 mg  4 mg Intravenous Q6H PRN Charlotte Villalobos MD   4 mg at 03/05/24 1621    piperacillin-tazobactam (ZOSYN) 4.5 g in dextrose 5 % in water (D5W) 100 mL IVPB (MB+)  4.5 g Intravenous Q12H Danish Reyes MD 25 mL/hr at 03/09/24 1056 4.5 g at 03/09/24 1056    sevelamer carbonate tablet 1,600 mg  1,600 mg Oral QID (WM & HS) Hayley Morocho MD   1,600 mg at 03/09/24 1200    sodium bicarbonate tablet 650 mg  650 mg Oral QID Hayley Morocho MD   650 mg at 03/09/24 1200    sodium chloride 0.9% flush 10 mL  10 mL Intravenous Q12H PRN Sid Cárdenas MD        vitamin D 1000 units tablet 2,000 Units  2,000 Units Oral Daily Sid Cárdenas MD   2,000 Units at 03/09/24 1043       Past Medical/Surgical/Family History:  Medical:   Past Medical History:   Diagnosis Date    Acute encephalopathy 2/29/2024    Acute hypoxemic respiratory failure 12/19/2019    Acute right-sided thoracic back pain 12/09/2019    Allergy     Asthma     Basal cell carcinoma     left forehead    Basal cell carcinoma     left nose    Basal cell carcinoma 05/20/2015    right nose    Basal cell carcinoma 12/22/2015    left lower post neck    Basal cell carcinoma 12/03/2019    left ant scalp     BCC (basal cell carcinoma of skin) 10/20/2022    Right alar groove    Bilateral pleural effusion     Bilateral renal cysts     Breast cancer     CAD (coronary artery disease)     Cardiomyopathy     Cardiomyopathy, ischemic     Cataract     Chest pain 3/4/2024    CHF (congestive heart failure)     CKD (chronic kidney disease) stage 4, GFR 15-29 ml/min     Colon polyp 2011    Controlled type 2 diabetes mellitus with both eyes affected by mild nonproliferative retinopathy and macular edema, with long-term current use of insulin 02/22/2018    COPD (chronic obstructive pulmonary disease)     COPD  exacerbation 04/08/2018    Current mild episode of major depressive disorder without prior episode 01/25/2022    Defibrillator discharge     Dependence on renal dialysis 08/22/2023    Diabetes mellitus     Diabetes mellitus type II     Diabetes with neurologic complications     Edema     ESRD needing dialysis     Goiter     MNG    Hematuria, unspecified     HX: breast cancer     Hyperlipidemia     Hypertension     Hypocalcemia     Hypokalemia     Hyponatremia     Hyponatremia 04/02/2022    Iron deficiency anemia 05/16/2017    Iron deficiency anemia     Iron deficiency anemia     Left kidney mass     Meningioma     Microalbuminuria due to type 2 diabetes mellitus 01/26/2022    Osteoporosis, postmenopausal     Pneumonia 12/08/2019    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    Proteinuria 01/21/2019    Pseudomonas pneumonia     SCC (squamous cell carcinoma) 08/25/2022    right posterior neck    Skin cancer     s/p excision    Sleep apnea     CPAP    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/2015    mid forehead    Unspecified vitamin D deficiency     UTI (urinary tract infection) 04/02/2022    Ventricular tachycardia     Vitamin B12 deficiency     Vitamin D deficiency disease       Surgeries:   Past Surgical History:   Procedure Laterality Date    BASAL CELL CARCINOMA EXCISION      posterior neck and nose    BREAST BIOPSY      BREAST CYST EXCISION Left     BREAST SURGERY      CARDIAC DEFIBRILLATOR PLACEMENT      x 2    CATARACT EXTRACTION W/  INTRAOCULAR LENS IMPLANT Bilateral     CHOLECYSTECTOMY      COLONOSCOPY N/A 11/5/2019    Procedure: COLONOSCOPY;  Surgeon: Boaz Botello MD;  Location: [address]);  Service: Endoscopy;  Laterality: N/A;  [facility]     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    FRACTURE SURGERY      left elbow and wrist as a child    HYSTERECTOMY      INSERTION, CATHETER, DIALYSIS, PERITONEAL, LAPAROSCOPIC N/A 4/25/2023    Procedure: INSERTION, CATHETER, DIALYSIS, PERITONEAL,  LAPAROSCOPIC;  Surgeon: Marcelo Isaac MD;  Location: [facility] OR;  Service: General;  Laterality: N/A;    LAPAROSCOPIC LYSIS OF ADHESIONS N/A 4/25/2023    Procedure: LYSIS, ADHESIONS, LAPAROSCOPIC;  Surgeon: Marcelo Isaac MD;  Location: [facility] OR;  Service: General;  Laterality: N/A;    MASTECTOMY Right     OMENTOPEXY, LAPAROSCOPIC N/A 4/25/2023    Procedure: OMENTOPEXY, LAPAROSCOPIC;  Surgeon: Marcelo Isaac MD;  Location: [facility] OR;  Service: General;  Laterality: N/A;    REPLACEMENT OF IMPLANTABLE CARDIOVERTER-DEFIBRILLATOR (ICD) GENERATOR N/A 12/17/2018    Procedure: REPLACEMENT, ICD GENERATOR;  Surgeon: Jan Mckeon MD;  Location: [facility] EP LAB;  Service: Cardiology;  Laterality: N/A;  DONNA, CRT-D gen change, MDT, MAC, SK, 3 Prep    REVISION OF SKIN POCKET FOR CARDIOVERTER-DEFIBRILLATOR  12/17/2018    Procedure: Revision, Skin Pocket, For Cardioverter-Defibrillator;  Surgeon: Jan Mckeon MD;  Location: [facility] EP LAB;  Service: Cardiology;;    SQUAMOUS CELL CARCINOMA EXCISION      remved from forehead    TONSILLECTOMY        Family:   Family History   Problem Relation Age of Onset    Asthma Mother     Hypertension Mother     Stroke Mother     Diabetes Father     Cardiomyopathy Father     Diabetes Sister     Heart disease Sister     Heart attack Sister     Heart attack Brother     Diabetes Brother     Heart disease Brother     Hypertension Brother     Diabetes Brother     Heart disease Brother     Hypertension Brother     Cerebral aneurysm Brother     Diabetes Brother     Heart disease Brother     Cancer Brother         colon    Diabetes Brother     Diabetes Daughter         prediabetes    Cancer Daughter         melanoma    Obesity Daughter     Melanoma Daughter     Cancer Son         skin    Diabetes Son         prediabetes    Diabetes Son     No Known Problems Maternal Grandmother     No Known Problems Maternal Grandfather     No Known Problems Paternal Grandmother     No Known Problems Paternal  Grandfather     Amblyopia Neg Hx     Blindness Neg Hx     Cataracts Neg Hx     Glaucoma Neg Hx     Macular degeneration Neg Hx     Retinal detachment Neg Hx     Strabismus Neg Hx     Thyroid disease Neg Hx      Current Evaluation:     Vital Signs:   Vitals:    03/09/24 1210   BP:    Pulse: 86   Resp:    Temp:         Neurological Examination   Orientation  Alert, awake, oriented to self, place, time, and situation.  Memory  Recent and remote memory intact.  Language  No dysarthria, No aphasia.   Cranial Nerves  PERRL, VF intact, EOMI, V1-V3 intact, symmetric facial expression, hearing grossly intact, SCM & TPZ 5/5, tongue midline, symmetric palate elevation.  Motor  Normal Bulk  Normal Tone  5/5 strength in 4 extremities  Does exhibit negative myoclonus likely in the setting of of RF  Sensory  Normal to light touch throughout  Romberg Negative  DTR   +2 symmetric  Cerebellar/Gait  Normal finger to nose.    RADIOLOGY STUDIES:  I have personally reviewed the images performed.     HEAD CT: No abnormality on CTH     BRAIN MRI: pending      Assessment:  Plan:     83 year old female with PMH of ESRD on PD, type 2 diabetes, hypertension, asthma/COPD, HLD, CAD breast CA presented to the ER for chest pain and encephalopathy. Symptoms resolving and patient following command, Neurology consulted due to asterixis of both upper extremity. Given her history of renal failure and on PD, her asterixis is likely 2/2 to her RF. Will recommend MRI brain w wo con to reul out any structural lesion that could contribute to his symptoms, Will also recommend sEEG to r/o epileptiform activity in a patient with advanced metabolic syndrome which is likely to explain her Asterixis.    Plan  Negative Myoclonus in the setting of ESRD  -Presented with confusion and chest pain over a week  -Symptom resolving  -Found to have asterixis on exam  -Ammonia level normal but with elevated BUN/Cr  -Will recommend MRI brain w wo con to r/o structural  lesion  -Recommend sEEG to rule out epileptiform activities.  -Less likely to initiate AED at this time.  -Rest of care per primary team     Case discussed with Dr. Teofilo Matias MD  [employer] [facility] PGY-4  [facility] Consult Service

## 2024-03-09 NOTE — PROGRESS NOTES
03/09/24 0900   Cycler Peritoneal Dialysis   Initial Drain Volume (mL) 21 mL   Effluent Appearance Clear   Number of Cycles 5   Cycler PD Total UF (mL) 605 mL   Cycler Treatment Comments Pt completeed tx without isses

## 2024-03-09 NOTE — PROGRESS NOTES
Patient seen.  Labs and vitals reviewed.  Replace K prn.  /61   Pulse 86.  Peritoneal dialysis   cc.

## 2024-03-09 NOTE — ASSESSMENT & PLAN NOTE
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 Will replace calcium and monitor electrolytes closely. The latest calcium labs have been reviewed and are listed below.  Recent Labs   Lab 03/09/24  0247   CALCIUM 7.0*         No results for input(s): "CAION" in the last 24 hours.    Calcium supplement    "

## 2024-03-09 NOTE — ASSESSMENT & PLAN NOTE
Patient has hyponatremia which is controlled,We will aim to correct the sodium by 4-6mEq in 24 hours. We will monitor sodium Daily. The hyponatremia is due to Dehydration/hypovolemia, Heart Failure, and renal insufficiency. We will obtain the following studies: TSH, T4. We will treat the hyponatremia with Fluid restriction of:  1.5 liter per day. The patient's sodium results have been reviewed and are listed below.  Recent Labs   Lab 03/09/24  0247

## 2024-03-09 NOTE — ASSESSMENT & PLAN NOTE
Suspected toxic/ metabolic etiology - mentation improving but continues to have involuntary periodic jerking of bilateral UE   2/2? Infectious vs other, ?uremia. No culprit medications noted    Avoid sedating medications  Ammonia levels elevated, ?etiology, add lactulose as it is a relatively benign medication & may help  CPAP at bedtime/ naps    Neurology consult for involuntary jerks - recommendations noted

## 2024-03-10 LAB
OHS QRS DURATION: 132 MS
OHS QTC CALCULATION: 523 MS
POCT GLUCOSE: 108 MG/DL (ref 70–110)
POCT GLUCOSE: 145 MG/DL (ref 70–110)
POCT GLUCOSE: 184 MG/DL (ref 70–110)
POCT GLUCOSE: 208 MG/DL (ref 70–110)

## 2024-03-10 PROCEDURE — 27100171 HC OXYGEN HIGH FLOW UP TO 24 HOURS

## 2024-03-10 PROCEDURE — 94660 CPAP INITIATION&MGMT: CPT

## 2024-03-10 PROCEDURE — 25000003 PHARM REV CODE 250: Performed by: INTERNAL MEDICINE

## 2024-03-10 PROCEDURE — 27000221 HC OXYGEN, UP TO 24 HOURS

## 2024-03-10 PROCEDURE — 11000001 HC ACUTE MED/SURG PRIVATE ROOM

## 2024-03-10 PROCEDURE — 94799 UNLISTED PULMONARY SVC/PX: CPT | Mod: XB

## 2024-03-10 PROCEDURE — 63600175 PHARM REV CODE 636 W HCPCS: Performed by: FAMILY MEDICINE

## 2024-03-10 PROCEDURE — 94799 UNLISTED PULMONARY SVC/PX: CPT

## 2024-03-10 PROCEDURE — 97530 THERAPEUTIC ACTIVITIES: CPT | Mod: CQ

## 2024-03-10 PROCEDURE — 63600175 PHARM REV CODE 636 W HCPCS

## 2024-03-10 PROCEDURE — 63600175 PHARM REV CODE 636 W HCPCS: Performed by: STUDENT IN AN ORGANIZED HEALTH CARE EDUCATION/TRAINING PROGRAM

## 2024-03-10 PROCEDURE — 94761 N-INVAS EAR/PLS OXIMETRY MLT: CPT

## 2024-03-10 PROCEDURE — 97110 THERAPEUTIC EXERCISES: CPT | Mod: CQ

## 2024-03-10 PROCEDURE — 97116 GAIT TRAINING THERAPY: CPT | Mod: CQ

## 2024-03-10 PROCEDURE — 25000003 PHARM REV CODE 250: Performed by: HOSPITALIST

## 2024-03-10 PROCEDURE — 90945 DIALYSIS ONE EVALUATION: CPT

## 2024-03-10 PROCEDURE — 25000003 PHARM REV CODE 250: Performed by: FAMILY MEDICINE

## 2024-03-10 PROCEDURE — 25000003 PHARM REV CODE 250: Performed by: STUDENT IN AN ORGANIZED HEALTH CARE EDUCATION/TRAINING PROGRAM

## 2024-03-10 PROCEDURE — 63600175 PHARM REV CODE 636 W HCPCS: Performed by: HOSPITALIST

## 2024-03-10 PROCEDURE — 99900035 HC TECH TIME PER 15 MIN (STAT)

## 2024-03-10 RX ORDER — HYDRALAZINE HYDROCHLORIDE 20 MG/ML
10 INJECTION INTRAMUSCULAR; INTRAVENOUS EVERY 6 HOURS PRN
Status: DISCONTINUED | OUTPATIENT
Start: 2024-03-10 | End: 2024-03-11 | Stop reason: HOSPADM

## 2024-03-10 RX ORDER — LANOLIN ALCOHOL/MO/W.PET/CERES
400 CREAM (GRAM) TOPICAL ONCE
Status: COMPLETED | OUTPATIENT
Start: 2024-03-10 | End: 2024-03-10

## 2024-03-10 RX ORDER — LABETALOL HYDROCHLORIDE 5 MG/ML
10 INJECTION, SOLUTION INTRAVENOUS EVERY 6 HOURS PRN
Status: DISCONTINUED | OUTPATIENT
Start: 2024-03-10 | End: 2024-03-11 | Stop reason: HOSPADM

## 2024-03-10 RX ADMIN — MAGNESIUM OXIDE TAB 400 MG (241.3 MG ELEMENTAL MG) 400 MG: 400 (241.3 MG) TAB at 01:03

## 2024-03-10 RX ADMIN — HEPARIN SODIUM 5000 UNITS: 5000 INJECTION INTRAVENOUS; SUBCUTANEOUS at 01:03

## 2024-03-10 RX ADMIN — POTASSIUM BICARBONATE 25 MEQ: 978 TABLET, EFFERVESCENT ORAL at 05:03

## 2024-03-10 RX ADMIN — FUROSEMIDE 60 MG: 10 INJECTION, SOLUTION INTRAMUSCULAR; INTRAVENOUS at 11:03

## 2024-03-10 RX ADMIN — INSULIN DETEMIR 8 UNITS: 100 INJECTION, SOLUTION SUBCUTANEOUS at 10:03

## 2024-03-10 RX ADMIN — MUPIROCIN: 20 OINTMENT TOPICAL at 10:03

## 2024-03-10 RX ADMIN — GUAIFENESIN 600 MG: 600 TABLET, EXTENDED RELEASE ORAL at 11:03

## 2024-03-10 RX ADMIN — GUAIFENESIN 600 MG: 600 TABLET, EXTENDED RELEASE ORAL at 10:03

## 2024-03-10 RX ADMIN — ASPIRIN 81 MG CHEWABLE TABLET 81 MG: 81 TABLET CHEWABLE at 11:03

## 2024-03-10 RX ADMIN — SODIUM BICARBONATE 650 MG: 650 TABLET ORAL at 11:03

## 2024-03-10 RX ADMIN — PIPERACILLIN AND TAZOBACTAM 4.5 G: 4; .5 INJECTION, POWDER, LYOPHILIZED, FOR SOLUTION INTRAVENOUS; PARENTERAL at 11:03

## 2024-03-10 RX ADMIN — HYDRALAZINE HYDROCHLORIDE 10 MG: 20 INJECTION, SOLUTION INTRAMUSCULAR; INTRAVENOUS at 10:03

## 2024-03-10 RX ADMIN — PIPERACILLIN AND TAZOBACTAM 4.5 G: 4; .5 INJECTION, POWDER, LYOPHILIZED, FOR SOLUTION INTRAVENOUS; PARENTERAL at 10:03

## 2024-03-10 RX ADMIN — CARVEDILOL 3.12 MG: 3.12 TABLET, FILM COATED ORAL at 10:03

## 2024-03-10 RX ADMIN — ACETAMINOPHEN 650 MG: 325 TABLET ORAL at 11:03

## 2024-03-10 RX ADMIN — FUROSEMIDE 60 MG: 10 INJECTION, SOLUTION INTRAMUSCULAR; INTRAVENOUS at 10:03

## 2024-03-10 RX ADMIN — MUPIROCIN: 20 OINTMENT TOPICAL at 11:03

## 2024-03-10 RX ADMIN — SODIUM BICARBONATE 650 MG: 650 TABLET ORAL at 10:03

## 2024-03-10 RX ADMIN — SEVELAMER CARBONATE 1600 MG: 800 TABLET, FILM COATED ORAL at 11:03

## 2024-03-10 RX ADMIN — SODIUM BICARBONATE 650 MG: 650 TABLET ORAL at 05:03

## 2024-03-10 RX ADMIN — HEPARIN SODIUM 5000 UNITS: 5000 INJECTION INTRAVENOUS; SUBCUTANEOUS at 06:03

## 2024-03-10 RX ADMIN — SEVELAMER CARBONATE 1600 MG: 800 TABLET, FILM COATED ORAL at 05:03

## 2024-03-10 RX ADMIN — CARVEDILOL 3.12 MG: 3.12 TABLET, FILM COATED ORAL at 11:03

## 2024-03-10 RX ADMIN — HEPARIN SODIUM 5000 UNITS: 5000 INJECTION INTRAVENOUS; SUBCUTANEOUS at 10:03

## 2024-03-10 RX ADMIN — CHOLECALCIFEROL TAB 25 MCG (1000 UNIT) 2000 UNITS: 25 TAB at 11:03

## 2024-03-10 RX ADMIN — Medication 100 MCG: at 11:03

## 2024-03-10 RX ADMIN — LACTULOSE 10 G: 20 SOLUTION ORAL at 11:03

## 2024-03-10 RX ADMIN — POTASSIUM BICARBONATE 25 MEQ: 978 TABLET, EFFERVESCENT ORAL at 10:03

## 2024-03-10 RX ADMIN — SEVELAMER CARBONATE 1600 MG: 800 TABLET, FILM COATED ORAL at 10:03

## 2024-03-10 RX ADMIN — POTASSIUM BICARBONATE 25 MEQ: 978 TABLET, EFFERVESCENT ORAL at 01:03

## 2024-03-10 RX ADMIN — INSULIN ASPART 1 UNITS: 100 INJECTION, SOLUTION INTRAVENOUS; SUBCUTANEOUS at 10:03

## 2024-03-10 NOTE — ASSESSMENT & PLAN NOTE
Suspected toxic/ metabolic etiology - mentation improving but continues to have involuntary periodic jerking of bilateral UE   2/2? Infectious vs other, ?uremia. No culprit medications noted    Avoid sedating medications  Ammonia levels elevated, ?etiology, add lactulose as it is a relatively benign medication & may help  CPAP at bedtime/ naps    Neurology consult for involuntary jerks - recommendations noted. May not be able to get MRI brain due to pacemaker.

## 2024-03-10 NOTE — ASSESSMENT & PLAN NOTE
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 Will replace calcium and monitor electrolytes closely. The latest calcium labs have been reviewed and are listed below.  No results for input(s): "CALCIUM" in the last 24 hours.      No results for input(s): "CAION" in the last 24 hours.    Calcium supplement    "

## 2024-03-10 NOTE — PROGRESS NOTES
[facility] Medicine  Progress Note    Patient Name: Myesha Quinones  MRN: [id number]  Patient Class: IP- Inpatient   Admission Date: 3/4/2024  Length of Stay: 5 days  Attending Physician: Charlotte Villalobos MD  Primary Care Provider: Dayton Michael MD        Subjective:     Principal Problem:Community acquired pneumonia      HPI:  83-year-old woman with ESRD on PD, type 2 diabetes, hypertension, asthma/COPD, HLD, CAD breast CA presented to the ER for notion of CP. Patient was jus recently admitted to the hospital for metabolic encephalopathy and discharge one day ago.  Per family since discharge she was not herself and had multiple complaints, with difficulty sleeping, decrease appetite, fatigue, generalized weakness. The night of the admission she wake up with left sided chest pain, type pressure, radiating to her back, aggravated by deep breathing, no relieving factors, associated with SOB. At home he BS was high around 250 per family. She was given 15 units of lantus. She was brought to ED. In the ED she was found to have hypoglycemia, as well as elevated BP and decreased oxygen saturation. When seen in the ER, she was moaning with severe CP, in mild to moderate distress, O2 sat aroung 96% on 3L oxygen. Labs in the ER with elevated troponin 0.034, , H/H 8.2/23.9, low calcium,elevated ALT 49, high PTH intact 541.6, low sodium 132. CXR There is bilateral pattern of interstitial and patchy alveolar infiltrates which has been progressing compare to previous admission. She has been admitted for HCAP and R/O ACS.    Overview/Hospital Course:  No notes on file    Interval History: doing better overall. Jerking resolved. Feels stronger.     Review of Systems  Objective:     Vital Signs (Most Recent):  Temp: 97.7 °F (36.5 °C) (03/10/24 1225)  Pulse: 93 (03/10/24 1225)  Resp: 20 (03/10/24 1225)  BP: (!) 143/64 (03/10/24 1225)  SpO2: 99 % (03/10/24 1225) Vital Signs (24h Range):  Temp:  [97.7 °F  (36.5 °C)-99.4 °F (37.4 °C)] 97.7 °F (36.5 °C)  Pulse:  [60-99] 93  Resp:  [16-20] 20  SpO2:  [92 %-100 %] 99 %  BP: (135-186)/(60-79) 143/64     Weight: 65.8 kg (145 lb 1 oz)  Body mass index is 25.7 kg/m².  No intake or output data in the 24 hours ending 03/10/24 1517        Physical Exam  Vitals and nursing note reviewed.   Constitutional:       General: She is not in acute distress.     Comments: elderly tired appearing female   Cardiovascular:      Rate and Rhythm: Normal rate and regular rhythm.      Heart sounds: Normal heart sounds. No murmur heard.  Pulmonary:      Effort: Pulmonary effort is normal. No respiratory distress.   Abdominal:      Palpations: Abdomen is soft.      Tenderness: There is no abdominal tenderness.      Comments: PD catheter in place   Musculoskeletal:      Right lower leg: No edema.      Left lower leg: No edema.   Skin:     General: Skin is warm and dry.      Findings: No bruising.   Neurological:      General: No focal deficit present.      Mental Status: She is alert.      Comments: Periodic involuntary jerking movements resolved             Significant Labs: All pertinent labs within the past 24 hours have been reviewed.    Significant Imaging: I have reviewed all pertinent imaging results/findings within the past 24 hours.    Assessment/Plan:      * Community acquired pneumonia  Recently discharged from the hospital and readmitted with abnormal CXR  Continue zosyn, azithromycin, vancomycin   Blood culture NGTD    Associated with a uncomplicated parapneumonic effusion     On chart review, prior h/o ABPA, ?MAC positive sputum cultures. Unclear if patient received complete treatment.     Pulmonary consult - recommendations reviewed   S/p bedside thoracentesis on 3/6 - fluid studies suggestive of exudative effusion, cultures/ cytology pending     Sputum culture growing pseudomonas, continue zosyn while inpatient, switch to PO at DC to complete 4-6 weeks of antibiotics per pulm  "recommendations      Acute encephalopathy  Suspected toxic/ metabolic etiology - mentation improving but continues to have involuntary periodic jerking of bilateral UE   2/2? Infectious vs other, ?uremia. No culprit medications noted    Avoid sedating medications  Ammonia levels elevated, ?etiology, add lactulose as it is a relatively benign medication & may help  CPAP at bedtime/ naps    Neurology consult for involuntary jerks - recommendations noted. May not be able to get MRI brain due to pacemaker.     Pleurodynia  ?in setting of pneumonia, pain improved with lidocaine patch       ALT (SGPT) level raised  Unclear etiology, possible in setting of infection   Repeat LFTs, consider further testing if LFT's continue to worsen      Troponin level elevated  With chest pain  Mild changes on 12 lead EKG  R/O ACS  Last 2D echo 3/1/24 Left Ventricle: The left ventricle is normal in size. There is concentric remodeling. Septal motion is consistent with pacing. There is reduced systolic function. Biplane (2D) method of discs ejection fraction is 52%.   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 Will replace calcium and monitor electrolytes closely. The latest calcium labs have been reviewed and are listed below.  No results for input(s): "CALCIUM" in the last 24 hours.      No results for input(s): "CAION" in the last 24 hours.    Calcium supplement      ESRD on peritoneal dialysis  Resume PD  Nephrology consult  Monitor electrolytes      Goals of care, counseling/discussion  Advance Care Planning    Code Status  I engaged the patient in a voluntary conversation about the patient's preferences for care  at the very end of life. The patient wishes to have a natural, peaceful death.  Along those lines, the " patient does not wish to have CPR or other invasive treatments performed when her heart and/or breathing stops. I communicated to the patient that a DNR order would be placed in her medical record to reflect this preference.  I spent a total of 5-7 minutes engaging the patient in this advance care planning discussion.            Acute hypoxemic respiratory failure  Patient with Hypoxic Respiratory failure which is Acute.  she is not on home oxygen. Supplemental oxygen was provided and noted-      .   Signs/symptoms of respiratory failure include- tachypnea, increased work of breathing, and lethargy. Contributing diagnoses includes - Pneumonia Labs and images were reviewed. Patient Has not had a recent ABG. Will treat underlying causes and adjust management of respiratory failure as follows- IV antibiotics     On 2 L oxygen NC. Wean as tolerated.     Essential hypertension  Chronic, controlled. Latest blood pressure and vitals reviewed-     Temp:  [97.2 °F (36.2 °C)-98.4 °F (36.9 °C)]   Pulse:  [71-94]   Resp:  [16-20]   BP: (102-131)/(50-65)   SpO2:  [92 %-97 %] .   Home meds for hypertension were reviewed and noted below.   Hypertension Medications               carvediloL (COREG) 25 MG tablet TAKE 2 TABLETS (50 MG TOTAL) BY MOUTH 2 (TWO) TIMES DAILY.    furosemide (LASIX) 40 MG tablet Take 1 tablet (40 mg total) by mouth once daily.    hydrALAZINE (APRESOLINE) 100 MG tablet Take 1 tablet (100 mg total) by mouth 2 (two) times daily.    nitroGLYCERIN (NITROSTAT) 0.4 MG SL tablet Place 0.4 mg under the tongue every 5 (five) minutes as needed for Chest pain.     valsartan (DIOVAN) 160 MG tablet Take 160 mg by mouth once daily.            While in the hospital, will manage blood pressure as follows; Adjust home antihypertensive regimen as follows- holding all antihypertensives due to borderline low BP, restart as necessary    Will utilize p.r.n. blood pressure medication only if patient's blood pressure greater than  "180/110 and she develops symptoms such as worsening chest pain or shortness of breath.    Hyponatremia  Patient has hyponatremia which is controlled,We will aim to correct the sodium by 4-6mEq in 24 hours. We will monitor sodium Daily. The hyponatremia is due to Dehydration/hypovolemia, Heart Failure, and renal insufficiency. We will obtain the following studies: TSH, T4. We will treat the hyponatremia with Fluid restriction of:  1.5 liter per day. The patient's sodium results have been reviewed and are listed below.  No results for input(s): "NA" in the last 24 hours.      Acute on chronic combined systolic and diastolic heart failure  With elevated BNP, not in acute decompensation  On lasix, volume removal with PD  Daily weight  I&Os      Hyperlipidemia associated with type 2 diabetes mellitus  Cont statin      Weakness  Likely from age related debility  Will consult PT  Encourage mobilization      COPD (chronic obstructive pulmonary disease)  Patient's COPD is controlled currently.  Patient is currently off COPD Pathway. Continue scheduled inhalers Antibiotics and Supplemental oxygen and monitor respiratory status closely.     Iron deficiency anemia  Patient's anemia is currently controlled. Has not received any PRBCs to date. Etiology likely d/t chronic disease due to Chronic Kidney Disease/ESRD  Current CBC reviewed-   Lab Results   Component Value Date    HGB 7.6 (L) 03/08/2024    HCT 24.0 (L) 03/08/2024     Monitor serial CBC and transfuse if patient becomes hemodynamically unstable, symptomatic or H/H drops below 7/21.    Type 2 diabetes mellitus with stage 4 chronic kidney disease and hypertension  Creatine stable for now. BMP reviewed- noted Estimated Creatinine Clearance: 6.7 mL/min (A) (based on SCr of 5.7 mg/dL (H)). according to latest data. 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  Monitor UOP and serial BMP and adjust therapy as needed. Renally dose meds. Avoid nephrotoxic medications and " procedures.    Coronary artery disease involving native coronary artery without angina pectoris - Non-obstructive 50% LAD  Patient with known CAD s/p  pacemaker and stent placement, which is controlled Will continue ASA and monitor for S/Sx of angina/ACS. Continue to monitor on telemetry.       VTE Risk Mitigation (From admission, onward)           Ordered     heparin (porcine) injection 5,000 Units  Every 8 hours         03/04/24 0601     IP VTE HIGH RISK PATIENT  Once         03/04/24 0601     Place sequential compression device  Until discontinued         03/04/24 0601                    Discharge Planning   ALIX:      Code Status: DNR   Is the patient medically ready for discharge?:     Reason for patient still in hospital (select all that apply): Patient trending condition, Treatment, and Consult recommendations  Discharge Plan A: Home with family, Home Health          Charlotte Villalobos MD  Department of Hospital Medicine   [facility]

## 2024-03-10 NOTE — ASSESSMENT & PLAN NOTE
Advance Care Planning     Code Status  I engaged the patient in a voluntary conversation about the patient's preferences for care  at the very end of life. The patient wishes to have a natural, peaceful death.  Along those lines, the patient does not wish to have CPR or other invasive treatments performed when her heart and/or breathing stops. I communicated to the patient that a DNR order would be placed in her medical record to reflect this preference.  I spent a total of 5-7 minutes engaging the patient in this advance care planning discussion.

## 2024-03-10 NOTE — ASSESSMENT & PLAN NOTE
"Patient has hyponatremia which is controlled,We will aim to correct the sodium by 4-6mEq in 24 hours. We will monitor sodium Daily. The hyponatremia is due to Dehydration/hypovolemia, Heart Failure, and renal insufficiency. We will obtain the following studies: TSH, T4. We will treat the hyponatremia with Fluid restriction of:  1.5 liter per day. The patient's sodium results have been reviewed and are listed below.  No results for input(s): "NA" in the last 24 hours.    "

## 2024-03-10 NOTE — PT/OT/SLP PROGRESS
Physical Therapy Treatment    Patient Name:  Myesha Quinones   MRN:  [id number]    Recommendations:     Discharge Recommendations: Moderate Intensity Therapy  Discharge Equipment Recommendations: none  Barriers to discharge: Decreased caregiver support    Assessment:     Myesha Quinones is a 83 y.o. female admitted with a medical diagnosis of Community acquired pneumonia.  She presents with the following impairments/functional limitations: weakness, impaired endurance, gait instability, impaired balance, impaired functional mobility, decreased lower extremity function, edema, impaired cardiopulmonary response to activity Pt would continue to benefit from P.T. To address impairments listed above.  .    Rehab Prognosis: Fair; patient would benefit from acute skilled PT services to address these deficits and reach maximum level of function.    Recent Surgery: * No surgery found *      Plan:     During this hospitalization, patient to be seen 5 x/week to address the identified rehab impairments via gait training, therapeutic activities, therapeutic exercises, neuromuscular re-education and progress toward the following goals:    Plan of Care Expires:  04/03/24    Subjective     Patient/Family Comments/goals: Pt agreed to tx.  Pain/Comfort:  Pain Rating 1: 0/10  Pain Rating Post-Intervention 1: 0/10      Objective:     Communicated with Rn prior to session.  Patient found up in chair with peripheral IV, oxygen, telemetry upon PT entry to room.     General Precautions: Standard, fall, hearing impaired  Orthopedic Precautions: N/A  Braces: N/A  Respiratory Status: Nasal cannula, flow 1 L/min     Functional Mobility:  Transfers:     Sit to Stand:  contact guard assistance with rolling walker and vc's for hand placement x 2 reps  Gait: 90ft x 2 with RW and CGA with assist for IV pole and portable O2 @ 1lpm.  Standing rest between bouts secondary to mild SOB with pt instructed in PLB x 1.  Pt ambulates with decreased  step length and vc's to slow down some to avoid SOB with exertion.  No LOB  Balance: sitting good, standing fair, gait fair      AM-PAC 6 CLICK MOBILITY  Turning over in bed (including adjusting bedclothes, sheets and blankets)?: 3  Sitting down on and standing up from a chair with arms (e.g., wheelchair, bedside commode, etc.): 3  Moving to and from a bed to a chair (including a wheelchair)?: 3  Need to walk in hospital room?: 3  Climbing 3-5 steps with a railing?: 2       Treatment & Education:  Pt sitting up in b/s chair on wall unit O2 2 1lpm NC upon entering the room.  Pt connected to portable OP2 @ 1lpm for gait training as above. No LOB during gait.  VC's for upright posture as above and slower pace to avoid SOB with exertion. Standing rest between bouts secondary to decreased endurance and mild SOB that resolved with pt instructed in bouts of PLB.  Seated BLE therex: AP, LAQs with brief extension hold, hip flexion 10 x 2 reps with vc's for proper technique.  Pt c/o of buttock hurting when sitting up in b/s chair, but did not want to return to bed.  Pillow placed in chair to soften seated, and pt instructed in pressure relief techniques while sitting in chair.      Patient left up in chair with all lines intact, call button in reach, RN notified, and family present..    GOALS:   Multidisciplinary Problems       Physical Therapy Goals          Problem: Physical Therapy    Goal Priority Disciplines Outcome Goal Variances Interventions   Physical Therapy Goal     PT, PT/OT Ongoing, Progressing     Description: Goals to be met by: 4/3/2024     Patient will increase functional independence with mobility by performin. Supine to sit with Modified [place]  2. Sit to supine with Modified [place]  3. Sit to stand transfer with Stand-by Assistance  4. Bed to chair transfer with Stand-by Assistance using Rolling Walker  5. Gait  x 150 feet with Stand-by Assistance using Rolling Walker.                           Time Tracking:     PT Received On: 03/10/24  PT Start Time: 1444     PT Stop Time: 1515  PT Total Time (min): 31 min     Billable Minutes: Gait Training 13, Therapeutic Activity 8, and Therapeutic Exercise 10    Treatment Type: Treatment  PT/PTA: PTA     Number of PTA visits since last PT visit: 1     03/10/2024

## 2024-03-10 NOTE — SUBJECTIVE & OBJECTIVE
Interval History: doing better overall. Jerking resolved. Feels stronger.     Review of Systems  Objective:     Vital Signs (Most Recent):  Temp: 97.7 °F (36.5 °C) (03/10/24 1225)  Pulse: 93 (03/10/24 1225)  Resp: 20 (03/10/24 1225)  BP: (!) 143/64 (03/10/24 1225)  SpO2: 99 % (03/10/24 1225) Vital Signs (24h Range):  Temp:  [97.7 °F (36.5 °C)-99.4 °F (37.4 °C)] 97.7 °F (36.5 °C)  Pulse:  [60-99] 93  Resp:  [16-20] 20  SpO2:  [92 %-100 %] 99 %  BP: (135-186)/(60-79) 143/64     Weight: 65.8 kg (145 lb 1 oz)  Body mass index is 25.7 kg/m².  No intake or output data in the 24 hours ending 03/10/24 4377        Physical Exam  Vitals and nursing note reviewed.   Constitutional:       General: She is not in acute distress.     Comments: elderly tired appearing female   Cardiovascular:      Rate and Rhythm: Normal rate and regular rhythm.      Heart sounds: Normal heart sounds. No murmur heard.  Pulmonary:      Effort: Pulmonary effort is normal. No respiratory distress.   Abdominal:      Palpations: Abdomen is soft.      Tenderness: There is no abdominal tenderness.      Comments: PD catheter in place   Musculoskeletal:      Right lower leg: No edema.      Left lower leg: No edema.   Skin:     General: Skin is warm and dry.      Findings: No bruising.   Neurological:      General: No focal deficit present.      Mental Status: She is alert.      Comments: Periodic involuntary jerking movements resolved             Significant Labs: All pertinent labs within the past 24 hours have been reviewed.    Significant Imaging: I have reviewed all pertinent imaging results/findings within the past 24 hours.

## 2024-03-10 NOTE — PROGRESS NOTES
Patient seen.  Labs and vitals reviewed.  Replace K and Mag prn.  /74, 143/64.   Pulse 82.  Continue Peritoneal Dialysis.

## 2024-03-11 VITALS
HEIGHT: 63 IN | TEMPERATURE: 98 F | DIASTOLIC BLOOD PRESSURE: 72 MMHG | SYSTOLIC BLOOD PRESSURE: 167 MMHG | WEIGHT: 145.06 LBS | BODY MASS INDEX: 25.7 KG/M2 | OXYGEN SATURATION: 96 % | RESPIRATION RATE: 18 BRPM | HEART RATE: 101 BPM

## 2024-03-11 LAB
BACTERIA FLD AEROBE CULT: NO GROWTH
GRAM STN SPEC: NORMAL
GRAM STN SPEC: NORMAL
MAGNESIUM SERPL-MCNC: 1.8 MG/DL (ref 1.6–2.6)
POCT GLUCOSE: 124 MG/DL (ref 70–110)
POCT GLUCOSE: 219 MG/DL (ref 70–110)
POTASSIUM SERPL-SCNC: 3.8 MMOL/L (ref 3.5–5.1)

## 2024-03-11 PROCEDURE — 83735 ASSAY OF MAGNESIUM: CPT | Performed by: STUDENT IN AN ORGANIZED HEALTH CARE EDUCATION/TRAINING PROGRAM

## 2024-03-11 PROCEDURE — 63600175 PHARM REV CODE 636 W HCPCS: Performed by: FAMILY MEDICINE

## 2024-03-11 PROCEDURE — 63600175 PHARM REV CODE 636 W HCPCS

## 2024-03-11 PROCEDURE — 94799 UNLISTED PULMONARY SVC/PX: CPT | Mod: XB

## 2024-03-11 PROCEDURE — 25000003 PHARM REV CODE 250: Performed by: HOSPITALIST

## 2024-03-11 PROCEDURE — 36415 COLL VENOUS BLD VENIPUNCTURE: CPT | Performed by: STUDENT IN AN ORGANIZED HEALTH CARE EDUCATION/TRAINING PROGRAM

## 2024-03-11 PROCEDURE — 99900035 HC TECH TIME PER 15 MIN (STAT)

## 2024-03-11 PROCEDURE — 84132 ASSAY OF SERUM POTASSIUM: CPT | Performed by: STUDENT IN AN ORGANIZED HEALTH CARE EDUCATION/TRAINING PROGRAM

## 2024-03-11 PROCEDURE — 97116 GAIT TRAINING THERAPY: CPT | Mod: CQ

## 2024-03-11 PROCEDURE — 97530 THERAPEUTIC ACTIVITIES: CPT | Mod: CQ

## 2024-03-11 PROCEDURE — 25000003 PHARM REV CODE 250: Performed by: INTERNAL MEDICINE

## 2024-03-11 PROCEDURE — 25000003 PHARM REV CODE 250: Performed by: STUDENT IN AN ORGANIZED HEALTH CARE EDUCATION/TRAINING PROGRAM

## 2024-03-11 PROCEDURE — 63600175 PHARM REV CODE 636 W HCPCS: Performed by: HOSPITALIST

## 2024-03-11 PROCEDURE — 94761 N-INVAS EAR/PLS OXIMETRY MLT: CPT

## 2024-03-11 PROCEDURE — 97535 SELF CARE MNGMENT TRAINING: CPT | Mod: CO

## 2024-03-11 PROCEDURE — 25000003 PHARM REV CODE 250: Performed by: FAMILY MEDICINE

## 2024-03-11 RX ORDER — INSULIN GLARGINE 100 [IU]/ML
8 INJECTION, SOLUTION SUBCUTANEOUS NIGHTLY
Refills: 0 | Status: ON HOLD
Start: 2024-03-11 | End: 2024-04-23 | Stop reason: HOSPADM

## 2024-03-11 RX ORDER — CARVEDILOL 25 MG/1
12.5 TABLET ORAL 2 TIMES DAILY WITH MEALS
Status: ON HOLD
Start: 2024-03-11 | End: 2024-03-14 | Stop reason: HOSPADM

## 2024-03-11 RX ORDER — VALSARTAN 160 MG/1
80 TABLET ORAL DAILY
Start: 2024-03-11 | End: 2024-03-12

## 2024-03-11 RX ORDER — CARVEDILOL 6.25 MG/1
6.25 TABLET ORAL ONCE
Status: COMPLETED | OUTPATIENT
Start: 2024-03-11 | End: 2024-03-11

## 2024-03-11 RX ORDER — SEVELAMER CARBONATE 800 MG/1
1600 TABLET, FILM COATED ORAL 3 TIMES DAILY
Qty: 180 TABLET | Refills: 3 | Status: SHIPPED | OUTPATIENT
Start: 2024-03-11

## 2024-03-11 RX ORDER — SODIUM BICARBONATE 650 MG/1
650 TABLET ORAL 4 TIMES DAILY
Qty: 120 TABLET | Refills: 3 | Status: ON HOLD | OUTPATIENT
Start: 2024-03-11 | End: 2024-04-23

## 2024-03-11 RX ORDER — LEVOFLOXACIN 250 MG/1
250 TABLET ORAL DAILY
Qty: 20 TABLET | Refills: 0 | Status: SHIPPED | OUTPATIENT
Start: 2024-03-11 | End: 2024-03-31

## 2024-03-11 RX ORDER — POTASSIUM CHLORIDE 20 MEQ/1
40 TABLET, EXTENDED RELEASE ORAL ONCE
Status: COMPLETED | OUTPATIENT
Start: 2024-03-11 | End: 2024-03-11

## 2024-03-11 RX ORDER — FUROSEMIDE 80 MG/1
80 TABLET ORAL 2 TIMES DAILY
Qty: 60 TABLET | Refills: 3 | Status: SHIPPED | OUTPATIENT
Start: 2024-03-11

## 2024-03-11 RX ADMIN — SODIUM BICARBONATE 650 MG: 650 TABLET ORAL at 12:03

## 2024-03-11 RX ADMIN — LACTULOSE 10 G: 20 SOLUTION ORAL at 09:03

## 2024-03-11 RX ADMIN — POTASSIUM CHLORIDE 40 MEQ: 1500 TABLET, EXTENDED RELEASE ORAL at 09:03

## 2024-03-11 RX ADMIN — CARVEDILOL 6.25 MG: 6.25 TABLET, FILM COATED ORAL at 01:03

## 2024-03-11 RX ADMIN — HEPARIN SODIUM 5000 UNITS: 5000 INJECTION INTRAVENOUS; SUBCUTANEOUS at 05:03

## 2024-03-11 RX ADMIN — MUPIROCIN: 20 OINTMENT TOPICAL at 09:03

## 2024-03-11 RX ADMIN — GUAIFENESIN 600 MG: 600 TABLET, EXTENDED RELEASE ORAL at 09:03

## 2024-03-11 RX ADMIN — INSULIN ASPART 2 UNITS: 100 INJECTION, SOLUTION INTRAVENOUS; SUBCUTANEOUS at 12:03

## 2024-03-11 RX ADMIN — Medication 100 MCG: at 09:03

## 2024-03-11 RX ADMIN — FUROSEMIDE 60 MG: 10 INJECTION, SOLUTION INTRAMUSCULAR; INTRAVENOUS at 09:03

## 2024-03-11 RX ADMIN — ASPIRIN 81 MG CHEWABLE TABLET 81 MG: 81 TABLET CHEWABLE at 09:03

## 2024-03-11 RX ADMIN — CHOLECALCIFEROL TAB 25 MCG (1000 UNIT) 2000 UNITS: 25 TAB at 09:03

## 2024-03-11 RX ADMIN — SEVELAMER CARBONATE 1600 MG: 800 TABLET, FILM COATED ORAL at 09:03

## 2024-03-11 RX ADMIN — CARVEDILOL 3.12 MG: 3.12 TABLET, FILM COATED ORAL at 09:03

## 2024-03-11 RX ADMIN — SODIUM BICARBONATE 650 MG: 650 TABLET ORAL at 09:03

## 2024-03-11 RX ADMIN — SEVELAMER CARBONATE 1600 MG: 800 TABLET, FILM COATED ORAL at 12:03

## 2024-03-11 RX ADMIN — PIPERACILLIN AND TAZOBACTAM 4.5 G: 4; .5 INJECTION, POWDER, LYOPHILIZED, FOR SOLUTION INTRAVENOUS; PARENTERAL at 09:03

## 2024-03-11 NOTE — PROGRESS NOTES
VN called into room to review discharge instructions via telephone.  Went over doctor specific instructions, new medications, where to , follow up appointments, when to call the doctor.  All questions answered.  Informed pt of survey.  Notified nurse of assistance required.       03/11/24 0981   Nurse Notification   Charge Nurse Notified? Yes   Name of Charge Nurse Kim   Bedside Nurse Notified? Yes   Name of Bedside Nurse Lilliam   Nurse Notfication Method Secure Chat    Notification    Notified? Yes   Name of  Alda    Notification Method Secure Buy With Fetch    Notified Of Discharge Status   Safety/Activity   Safety Promotion/Fall Prevention family to remain at bedside;medications reviewed

## 2024-03-11 NOTE — PT/OT/SLP PROGRESS
Occupational Therapy   Treatment    Name: Myesha Quinones  MRN: [id number]  Admitting Diagnosis:  Community acquired pneumonia       Recommendations:     Discharge Recommendations: Moderate Intensity Therapy  Discharge Equipment Recommendations:  bedside commode  Barriers to discharge:  Other (Comment) (Increased level of assistance)    Assessment:     Myesha Quinones is a 83 y.o. female with a medical diagnosis of Community acquired pneumonia.  Performance deficits affecting function are weakness, impaired endurance, impaired self care skills, impaired functional mobility, gait instability, impaired balance, decreased upper extremity function, decreased lower extremity function, impaired coordination, impaired fine motor, impaired skin, edema, impaired cardiopulmonary response to activity.    Pt found in chair, agreeable to minimal therapy 2/2 just ambulated with nursing and anxious to go home.  Pt is progressing towards goals. Continue OT services to address functional goals, progressing as able.      Rehab Prognosis:  Good; patient would benefit from acute skilled OT services to address these deficits and reach maximum level of function.       Plan:     Patient to be seen 5 x/week to address the above listed problems via self-care/home management, therapeutic activities, therapeutic exercises  Plan of Care Expires: 04/04/24  Plan of Care Reviewed with: patient, family    Subjective     Chief Complaint: weakness  Patient/Family Comments/goals: to go home   Pain/Comfort:  Pain Rating 1: 0/10  Pain Rating Post-Intervention 1: 0/10    Objective:     Communicated with: RN prior to session.  Patient found up in chair with telemetry, peripheral IV, oxygen upon OT entry to room.    General Precautions: Standard, fall, hearing impaired    Orthopedic Precautions:N/A  Braces: N/A  Respiratory Status: Nasal cannula     Occupational Performance:     Activities of Daily Living:  Lower Body Dressing: stand by assistance  dofs/dons socks      [facility] 6 Click ADL: 20    Treatment & Education:  Educated pt and pts family on:   -home environment modifications to increase safety, reduce risk of fall as well as increase pt independence in home environment   -DME recommendations and use of- RW, BSC, shower chair   -LB drsg technique: sitting to thread BLEs into pants/underwear   -Need for 24/7 assistance at time of discharge    Patient left up in chair with all lines intact, call button in reach, and family present    GOALS:   Multidisciplinary Problems       Occupational Therapy Goals          Problem: Occupational Therapy    Goal Priority Disciplines Outcome Interventions   Occupational Therapy Goal     OT, PT/OT Ongoing, Progressing    Description: Goals to be met by: 4/4/23     Patient will increase functional independence with ADLs by performing:    LE Dressing with Modified [place].  Grooming while standing with Modified [place].  Toileting from toilet with Modified [place] for hygiene and clothing management.   Supine to sit with Modified [place].  Step transfer with Modified [place]  Toilet transfer to toilet with Modified [place].  Increased functional strength to WFL for self care skills and functional mobility.  Upper extremity exercise program x10 reps per handout, with independence.                         Time Tracking:     OT Date of Treatment: 03/11/24  OT Start Time: 1133  OT Stop Time: 1143  OT Total Time (min): 10 min    Billable Minutes:Self Care/Home Management 10          3/11/2024

## 2024-03-11 NOTE — PLAN OF CARE
Problem: Occupational Therapy  Goal: Occupational Therapy Goal  Description: Goals to be met by: 4/4/23     Patient will increase functional independence with ADLs by performing:    LE Dressing with Modified [place].  Grooming while standing with Modified [place].  Toileting from toilet with Modified [place] for hygiene and clothing management.   Supine to sit with Modified [place].  Step transfer with Modified [place]  Toilet transfer to toilet with Modified [place].  Increased functional strength to WFL for self care skills and functional mobility.  Upper extremity exercise program x10 reps per handout, with independence.    Outcome: Ongoing, Progressing   Myesha Quinones is a 83 y.o. female with a medical diagnosis of Community acquired pneumonia.  Performance deficits affecting function are weakness, impaired endurance, impaired self care skills, impaired functional mobility, gait instability, impaired balance, decreased upper extremity function, decreased lower extremity function, impaired coordination, impaired fine motor, impaired skin, edema, impaired cardiopulmonary response to activity.    Pt found in chair, agreeable to minimal therapy 2/2 just ambulated with nursing and anxious to go home.  Pt is progressing towards goals. Continue OT services to address functional goals, progressing as able.

## 2024-03-11 NOTE — PROGRESS NOTES
Nephrology Progress Note       Consult Requested By: Charlotte Villalobos MD  Reason for Consult: ESRD on PD      SUBJECTIVE:         Review of Systems   Constitutional:  Negative for chills, fever and malaise/fatigue.   HENT:  Negative for congestion and sore throat.    Eyes:  Negative for blurred vision, double vision and photophobia.   Respiratory:  Negative for cough and shortness of breath.    Cardiovascular:  Negative for chest pain, palpitations and leg swelling.   Gastrointestinal:  Negative for abdominal pain, diarrhea, nausea and vomiting.   Genitourinary:  Negative for dysuria and urgency.   Musculoskeletal:  Negative for joint pain and myalgias.   Skin:  Negative for itching and rash.   Neurological:  Positive for weakness. Negative for dizziness, sensory change and headaches.   Endo/Heme/Allergies:  Negative for polydipsia. Does not bruise/bleed easily.   Psychiatric/Behavioral:  Negative for depression.        Past Medical History:   Diagnosis Date    Acute encephalopathy 2/29/2024    Acute hypoxemic respiratory failure 12/19/2019    Acute right-sided thoracic back pain 12/09/2019    Allergy     Asthma     Basal cell carcinoma     left forehead    Basal cell carcinoma     left nose    Basal cell carcinoma 05/20/2015    right nose    Basal cell carcinoma 12/22/2015    left lower post neck    Basal cell carcinoma 12/03/2019    left ant scalp     BCC (basal cell carcinoma of skin) 10/20/2022    Right alar groove    Bilateral pleural effusion     Bilateral renal cysts     Breast cancer     CAD (coronary artery disease)     Cardiomyopathy     Cardiomyopathy, ischemic     Cataract     Chest pain 3/4/2024    CHF (congestive heart failure)     CKD (chronic kidney disease) stage 4, GFR 15-29 ml/min     Colon polyp 2011    Controlled type 2 diabetes mellitus 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 macular edema, with long-term current use of insulin 02/22/2018    COPD (chronic obstructive  pulmonary disease)     COPD exacerbation 04/08/2018    Current mild episode of major depressive disorder without prior episode 01/25/2022    Defibrillator discharge     Dependence on renal dialysis 08/22/2023    Diabetes mellitus     Diabetes mellitus type II     Diabetes with neurologic complications     Edema     ESRD needing dialysis     Goiter     MNG    Hematuria, unspecified     HX: breast cancer     Hyperlipidemia     Hypertension     Hypocalcemia     Hypokalemia     Hyponatremia     Hyponatremia 04/02/2022    Iron deficiency anemia 05/16/2017    Iron deficiency anemia     Iron deficiency anemia     Left kidney mass     Meningioma     Microalbuminuria due to type 2 diabetes mellitus 01/26/2022    Osteoporosis, postmenopausal     Pneumonia 12/08/2019    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    Proteinuria 01/21/2019    Pseudomonas pneumonia     SCC (squamous cell carcinoma) 08/25/2022    right posterior neck    Skin cancer     s/p excision    Sleep apnea     CPAP    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/2015    mid forehead    Unspecified vitamin D deficiency     UTI (urinary tract infection) 04/02/2022    Ventricular tachycardia     Vitamin B12 deficiency     Vitamin D deficiency disease          OBJECTIVE:     Vital Signs (Most Recent)  Vitals:    03/11/24 0502 03/11/24 0524 03/11/24 0812 03/11/24 0830   BP: (!) 177/80 (!) 159/73 (!) 159/72    BP Location: Right arm Right arm Right arm    Patient Position: Lying Lying Sitting    Pulse: 89 86 96    Resp: 16  18    Temp: 97.6 °F (36.4 °C)  97.8 °F (36.6 °C)    TempSrc: Oral  Oral    SpO2: 99%  96% 96%   Weight:       Height:             Date 03/11/24 0700 - 03/12/24 0659   Shift 9441-3147 9804-3491 5189-6574 24 Hour Total   INTAKE   Shift Total(mL/kg)       OUTPUT   Other 190   190   Shift Total(mL/kg) 190(2.9)   190(2.9)   Weight (kg) 65.8 65.8 65.8 65.8             Medications:   aspirin  81 mg Oral Daily    carvediloL  3.125 mg Oral BID    cyanocobalamin   100 mcg Oral Daily    epoetin [place]-epbx  10,000 Units Subcutaneous Once    epoetin [place]-epbx  10,000 Units Subcutaneous Every Mon, Wed, Fri    ergocalciferol  50,000 Units Oral Q7 Days    furosemide (LASIX) injection  60 mg Intravenous Q12H    guaiFENesin  600 mg Oral BID    heparin (porcine)  5,000 Units Subcutaneous Q8H    insulin detemir U-100  8 Units Subcutaneous QHS    lactulose  10 g Oral Daily    LIDOcaine  1 patch Transdermal Q24H    LIDOcaine  1 patch Transdermal Q24H    LIDOcaine viscous HCl 2%  15 mL Oral Once    mupirocin   Nasal BID    piperacillin-tazobactam (Zosyn) IV (PEDS and ADULTS) (extended infusion is not appropriate)  4.5 g Intravenous Q12H    sevelamer carbonate  1,600 mg Oral QID (WM & HS)    sodium bicarbonate  650 mg Oral QID    vitamin D  2,000 Units Oral Daily           Physical Exam  Vitals and nursing note reviewed.   Constitutional:       General: She is not in acute distress.     Appearance: She is not diaphoretic.      Comments: Frail    HENT:      Head: Normocephalic and atraumatic.      Mouth/Throat:      Pharynx: No oropharyngeal exudate.   Eyes:      General: No scleral icterus.     Conjunctiva/sclera: Conjunctivae normal.      Pupils: Pupils are equal, round, and reactive to light.   Cardiovascular:      Rate and Rhythm: Normal rate and regular rhythm.      Heart sounds: Normal heart sounds. No murmur heard.  Pulmonary:      Effort: Pulmonary effort is normal. No respiratory distress.      Breath sounds: Normal breath sounds.   Abdominal:      General: Bowel sounds are normal. There is no distension.      Palpations: Abdomen is soft.      Tenderness: There is no abdominal tenderness.      Comments: PD catheter site no drainage no TTP    Musculoskeletal:         General: Normal range of motion.      Cervical back: Normal range of motion and neck supple.   Skin:     General: Skin is warm and dry.      Findings: No erythema.   Neurological:      Mental Status: She is alert and  oriented to person, place, and time.      Cranial Nerves: No cranial nerve deficit.      Motor: Weakness present.   Psychiatric:         Mood and Affect: Affect normal.         Cognition and Memory: Memory normal.         Judgment: Judgment normal.         Laboratory:  Recent Labs   Lab 03/06/24 0315 03/07/24 0449 03/08/24 0247   WBC 12.88* 11.38 13.19*   HGB 8.1* 7.2* 7.6*   HCT 24.6* 21.9* 24.0*    203 240   MONO 6.1  0.8 7.6  0.9 7.8  1.0   EOSINOPHIL 0.6 1.1 1.1       Recent Labs   Lab 03/05/24 0317 03/06/24 0314 03/07/24 0449 03/08/24 0247 03/09/24 0247 03/11/24  0917   * 133* 134* 137 137  --    K 3.8 3.7 3.4* 3.9 3.2* 3.8   CL 97 96 95 94* 94*  --    CO2 17* 19* 24 24 22*  --    BUN 86* 76* 68* 63* 52*  --    CREATININE 5.3* 5.6* 5.7* 5.8* 5.2*  --    CALCIUM 6.6* 6.8* 6.5* 6.6* 7.0*  --    PHOS 8.4* 9.0* 8.8*  --   --   --          Diagnostic Results:  X-Ray: Reviewed  US: Reviewed  Echo: Reviewed  ASSESSMENT/PLAN:     1. 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   Had AMS  most likely due to PNA   -- 2/29  PD fluid culture and Cell count - no infection,   -- PD  with 1.5% 2L dwell  3 Cycles over 10 hr BUN  on high side. Now better but  volume up absorbed 128cc so change to 2.5% and cut back on time dwell to 2hr and do 4 cycles adjust Insulin sliding scale as needed for BS    --  lasix 80 PO BID  at discharge - she will follow up with Dr Borrero in clinic and her PD nurse will call her with instruction                 2. HTN   - controlled   3. Anemia of chronic kidney disease treated with MIGUEL   EPogen    monthly   Recent Labs   Lab 03/06/24 0315 03/07/24 0449 03/08/24  0247   HGB 8.1* 7.2* 7.6*   HCT 24.6* 21.9* 24.0*    203 240         Iron   Lab Results   Component Value Date    IRON 124 05/09/2023    TIBC 355 05/09/2023    FERRITIN 639 (H) 05/19/2023       4. 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     -- Sevelamer 1600 TIDWM   Recent Labs   Lab 03/06/24  0314 03/07/24  0449 03/11/24  0917   MG 1.9 1.8 1.8         Lab Results   Component Value Date    .6 (H) 03/04/2024    CALCIUM 7.0 (L) 03/09/2024    CAION 0.77 (L) 03/04/2024    PHOS 8.8 (H) 03/07/2024     Lab Results   Component Value Date    [id number] 23 (L) 03/01/2024     Acidosis   -- PO Bicarbonate 650 TID     Lab Results   Component Value Date    CO2 22 (L) 03/09/2024       5. Nutrition/Hypoalbuminemia    Recent Labs   Lab 03/08/24 0247   ALBUMIN 2.0*       Nepro with meals TID.       Thank you for allowing me to participate in care of your patient  With any question please call   Anna Fairbanks MD     [facility]  ESTRELLA Morocho MD,   MD TAMIKA Isabel MD E. V. Harmon, NP  [address]   [place], [address] [phone]  After hours answering service: [phone]

## 2024-03-11 NOTE — PROGRESS NOTES
[facility]                    Pharmacy       Discharge Medication Education    Patient ACCEPTED medication education. Pharmacy has provided education on the name, indication, and possible side effects of the medication(s) prescribed, using teach-back method.     The following medications have also been discussed, during this admission.        Medication List        START taking these medications      levoFLOXacin 250 MG tablet  Commonly known as: LEVAQUIN  Take 1 tablet (250 mg total) by mouth once daily. for 20 days            CHANGE how you take these medications      carvediloL 25 MG tablet  Commonly known as: COREG  Take 0.5 tablets (12.5 mg total) by mouth 2 (two) times daily with meals.  What changed:   how much to take  how to take this  when to take this  additional instructions     furosemide 80 MG tablet  Commonly known as: LASIX  Take 1 tablet (80 mg total) by mouth 2 (two) times a day.  What changed:   medication strength  how much to take  when to take this     LANTUS SOLOSTAR U-100 INSULIN glargine 100 units/mL SubQ pen  Generic drug: insulin  Inject 8 Units into the skin every evening.  What changed: how much to take     sevelamer carbonate 800 mg Tab  Commonly known as: RENVELA  Take 2 tablets (1,600 mg total) by mouth 3 (three) times daily.  What changed: how much to take     sodium bicarbonate 650 MG tablet  Take 1 tablet (650 mg total) by mouth 4 (four) times daily.  What changed: when to take this     valsartan 160 MG tablet  Commonly known as: DIOVAN  Take 0.5 tablets (80 mg total) by mouth once daily.  What changed: how much to take            CONTINUE taking these medications      acetaminophen 500 MG tablet  Commonly known as: TYLENOL     albuterol 90 mcg/actuation inhaler  Commonly known as: PROVENTIL/VENTOLIN HFA  Inhale 2 puffs into the lungs every 6 (six) hours as needed for Wheezing. Rescue     albuterol-ipratropium 2.5 mg-0.5 mg/3 mL nebulizer solution  Commonly  "known as: DUO-NEB  Take 3 mLs by nebulization every 4 (four) hours as needed for Wheezing.     aspirin 81 MG Chew     blood sugar diagnostic Strp  Commonly known as: ACCU-CHEK HECTOR  Uses Accu-Check Hector meter to test BG 4x/day     calcitRIOL 0.5 MCG Cap  Commonly known as: ROCALTROL  Take 1 capsule (0.5 mcg total) by mouth once daily.     cholecalciferol (vitamin D3) 50 mcg (2,000 unit) Tab  Commonly known as: VITAMIN D3     cyanocobalamin 100 MCG tablet  Commonly known as: VITAMIN B-12     epoetin [place]-epbx 10,000 unit/mL imjection  Commonly known as: RETACRIT  Inject 1 mL (10,000 Units total) into the skin every 30 days.     ergocalciferol 50,000 unit Cap  Commonly known as: ERGOCALCIFEROL     * estradioL 0.01 % (0.1 mg/gram) vaginal cream  Commonly known as: ESTRACE  PLACE 1 GRAM VAGINALLY EVERY OTHER DAY     * estradioL 2 mg (7.5 mcg /24 hour) vaginal ring  Commonly known as: ESTRING  Place 2 mg vaginally every 3 (three) months.     fluticasone propionate 50 mcg/actuation nasal spray  Commonly known as: FLONASE     lancets Misc  Commonly known as: ACCU-CHEK SOFTCLIX LANCETS  Uses Accu-Chek Hector meter to test BG 2x/day     montelukast 10 mg tablet  Commonly known as: SINGULAIR  Take 1 tablet (10 mg total) by mouth once daily.     nitroGLYCERIN 0.4 MG SL tablet  Commonly known as: NITROSTAT     omega-3 fatty acids 500 mg Cap     pen needle, diabetic 31 gauge x 3/16" Ndle  Commonly known as: BD ULTRA-FINE MINI PEN NEEDLE  Use with insulin twice a day.     pravastatin 40 MG tablet  Commonly known as: PRAVACHOL  Take 1 tablet (40 mg total) by mouth once daily.           * This list has 2 medication(s) that are the same as other medications prescribed for you. Read the directions carefully, and ask your doctor or other care provider to review them with you.                STOP taking these medications      amoxicillin-clavulanate 250-125mg 250-125 mg Tab  Commonly known as: AUGMENTIN     hydrALAZINE 100 MG " tablet  Commonly known as: APRESOLINE     OZEMPIC 0.25 mg or 0.5 mg (2 mg/3 mL) pen injector  Generic drug: semaglutide     TRULICITY 1.5 mg/0.5 mL pen injector  Generic drug: dulaglutide               Where to Get Your Medications        These medications were sent to [facility]  [address], [place] [address]      Hours: Mon-Fri, 8a-5:30p Phone: [phone]   furosemide 80 MG tablet  levoFLOXacin 250 MG tablet  sevelamer carbonate 800 mg Tab  sodium bicarbonate 650 MG tablet       Information about where to get these medications is not yet available    Ask your nurse or doctor about these medications  carvediloL 25 MG tablet  LANTUS SOLOSTAR U-100 INSULIN glargine 100 units/mL SubQ pen  valsartan 160 MG tablet          Thank you  Kayode Gray, PharmD

## 2024-03-11 NOTE — PT/OT/SLP PROGRESS
Physical Therapy Treatment    Patient Name:  Myesha Quinones   MRN:  [id number]    Recommendations:     Discharge Recommendations: Moderate Intensity Therapy  Discharge Equipment Recommendations: none  Barriers to discharge:  decreased functional mobility/balance    Assessment:     Myesha Quinones is a 83 y.o. female admitted with a medical diagnosis of Community acquired pneumonia.  She presents with the following impairments/functional limitations: weakness, impaired endurance, impaired self care skills, impaired functional mobility, gait instability, impaired balance, decreased upper extremity function, decreased lower extremity function, decreased coordination, impaired coordination, impaired cardiopulmonary response to activity Pt would continue to benefit from P.T. To address impairments listed above.  .    Rehab Prognosis: Fair; patient would benefit from acute skilled PT services to address these deficits and reach maximum level of function.    Recent Surgery: * No surgery found *      Plan:     During this hospitalization, patient to be seen 5 x/week to address the identified rehab impairments via gait training, therapeutic activities, therapeutic exercises, neuromuscular re-education and progress toward the following goals:    Plan of Care Expires:  04/03/24    Subjective       Patient/Family Comments/goals: Pt agreed to tx.  Pain/Comfort:  Pain Rating 1: 0/10  Pain Rating Post-Intervention 1: 0/10      Objective:     Communicated with RN prior to session.  Patient found up in chair with peripheral IV, oxygen, telemetry upon PT entry to room.     General Precautions: Standard, fall, hearing impaired  Orthopedic Precautions: N/A  Braces: N/A  Respiratory Status: Nasal cannula, flow 1 L/min     Functional Mobility:  Transfers:     Sit to Stand:  stand by assistance and contact guard assistance with rolling walker and vc's for hand placement  Gait: 90ft and 45ft x 2 with RW, portable O2 @ 1lpm and  CGA/SBA with assist for IV pole and portable O2.  Standing rest after first 90ft  with O2 sats taken - 91-93%.  Pt ambulated with RW on 1lpm and CGA/SBA, then ambulated 45ft with [place] without A.D. (HHA).  Pt is unstable without RW at this time and probably would not be safe with a S.C. at this time.  Pt ambulated back to b/s chair with same assistance. See below.  Balance: sitting good, standing fair/fair RW, gait fair/fair+ Rw      AM-PAC 6 CLICK MOBILITY  Turning over in bed (including adjusting bedclothes, sheets and blankets)?: 4  Sitting down on and standing up from a chair with arms (e.g., wheelchair, bedside commode, etc.): 3  Moving from lying on back to sitting on the side of the bed?: 4  Moving to and from a bed to a chair (including a wheelchair)?: 3  Climbing 3-5 steps with a railing?: 3       Treatment & Education:  Pt found sitting up in b/s chair on wall unit O2 @ 1lpm NC.  Pt ambulated as above with mild SOB at rest points and was instructed in PLB bouts.  Pt stated she usually uses her S.C. at home and her rollator at times.  Pt ambulated without A.D. 45ft holding PTA's hand and was very unsteady.  PTA advised pt to ambulate with her rollator at this time and not her S.C. for increased stability and safety.  Seated BLE therex: AP, LAQs with brief hold in extension, hip flexion 10 x 2 reps.  O2 transferred back to wall unit @ 1lpm at end of tx.    Patient left up in chair with all lines intact, call button in reach, Rn notified, and pt's family present..    GOALS:   Multidisciplinary Problems       Physical Therapy Goals       Not on file              Multidisciplinary Problems (Resolved)          Problem: Physical Therapy    Goal Priority Disciplines Outcome Goal Variances Interventions   Physical Therapy Goal   (Resolved)     PT, PT/OT Met     Description: Goals to be met by: 4/3/2024     Patient will increase functional independence with mobility by performin. Supine to sit with Modified  [place]  2. Sit to supine with Modified [place]  3. Sit to stand transfer with Stand-by Assistance  4. Bed to chair transfer with Stand-by Assistance using Rolling Walker  5. Gait  x 150 feet with Stand-by Assistance using Rolling Walker.                          Time Tracking:     PT Received On: 03/11/24  PT Start Time: 1221     PT Stop Time: 1247  PT Total Time (min): 26 min     Billable Minutes: Gait Training 13 and Therapeutic Activity 13    Treatment Type: Treatment  PT/PTA: PTA     Number of PTA visits since last PT visit: 2     03/11/2024

## 2024-03-11 NOTE — HOSPITAL COURSE
"83-year-old female with PMH of ESRD on PD, type 2 diabetes mellitus, hypertension, obstructive airway disease, CAD, breast cancer, hyperlipidemia presented with fatigue, lethargy, periodic involuntary jerks found to have Pseudomonas pneumonia with uncomplicated parapneumonic effusion.  Underwent left thoracentesis, pleural fluid cultures negative so far.  Respiratory cultures grew pansensitive Pseudomonas.  Patient was seen by pulmonology and recommended 4-6 weeks of antibiotics for Pseudomonas pneumonia.  Patient's symptoms improved but required supplemental oxygen at discharge.  Discharged on levofloxacin 250 mg daily, end date 03/31 for 4 weeks therapy.  Frequency of PD was increased from twice a week to daily as uremia thought to be contributing to periodic involuntary jerks.  With daily PD, involuntary movements resolved.  Antihypertensives initially held given borderline low blood pressures, resumed at lower doses at the time of discharge.  Lasix dose increased to 80 mg b.i.d. per Nephrology recommendation.  Recommend follow-up on blood pressure and uptitrate/restart as deemed necessary.  Patient and family comfortable for discharge home with follow-up in PD clinic and PCP follow-up.    Of note, code status was discussed with the patient.  Patient chose to be a DNR during the hospital stay.    BP (!) 167/72 (BP Location: Right arm, Patient Position: Sitting)   Pulse 101   Temp 98.2 °F (36.8 °C) (Oral)   Resp 18   Ht 5' 3" (1.6 m)   Wt 65.8 kg (145 lb 1 oz)   LMP  (LMP Unknown)   SpO2 96%   BMI 25.70 kg/m²     Physical Exam  Vitals and nursing note reviewed.   Constitutional:       General: She is not in acute distress.  Cardiovascular:      Rate and Rhythm: Normal rate and regular rhythm.      Heart sounds: Normal heart sounds. No murmur heard.  Pulmonary:      Effort: Pulmonary effort is normal. No respiratory distress.   Abdominal:      Palpations: Abdomen is soft.      Tenderness: There is no " abdominal tenderness.      Comments: PD catheter in place   Musculoskeletal:      Right lower leg: No edema.      Left lower leg: No edema.   Skin:     General: Skin is warm and dry.      Findings: No bruising.   Neurological:      General: No focal deficit present.      Mental Status: She is alert.      Comments: Periodic involuntary jerking movements resolved

## 2024-03-11 NOTE — PLAN OF CARE
SW sent HH referral via madvertise. Pt will be contacted by  agency to schedule first appointment time/date. Pts family at bedside and will provide transport home. SW will continue to follow pt throughout her transitions of care and assist with any dc needs.     Pt daughter and pt in communicated with PD nurse for any questions.     Cleared from  . Bedside Nurse and VN notified.    Future Appointments   Date Time Provider Department Center   3/14/2024  9:30 AM CARRIE Arechiga MD [facility] OPHTHA [place]   3/15/2024 10:00 AM Ev Jiménez MD [facility] IMPRI [place] [facility]   4/10/2024  3:30 PM Kelvin Maki MD [facility] PULMSVC [facility]   4/17/2024  9:00 AM Eric Brooke PA-C [facility] UROGYN [facility]   4/24/2024 10:40 AM Dayton Michael MD [facility] FAM MED [facility]   6/5/2024 12:15 PM EKG, APPT NOM EKG [facility]   6/5/2024 12:40 PM COORDINATED DEVICE CHECK NOM ARRHPRO [facility]   6/5/2024  1:30 PM Celeste Rogers, NP NOM ARRHYTH [facility]        03/11/24 0948   Final Note   Assessment Type Final Discharge Note   Anticipated Discharge Disposition Home-[facility] Resources/Appts/Education Provided Appointments scheduled and added to AVS   Post-Acute Status   Discharge Delays None known at this time

## 2024-03-11 NOTE — DISCHARGE SUMMARY
[facility] Medicine  Discharge Summary      Patient Name: Myesha Quinones  MRN: [id number]  ALEXIS: [id number]  Patient Class: IP- Inpatient  Admission Date: 3/4/2024  Hospital Length of Stay: 6 days  Discharge Date and Time: 3/11/2024  4:57 PM  Attending Physician: No att. providers found   Discharging Provider: Charlotte Villalobos MD  Primary Care Provider: Datyon Michael MD    Primary Care Team: Networked reference to record PCT     HPI:   83-year-old woman with ESRD on PD, type 2 diabetes, hypertension, asthma/COPD, HLD, CAD breast CA presented to the ER for notion of CP. Patient was jus recently admitted to the hospital for metabolic encephalopathy and discharge one day ago.  Per family since discharge she was not herself and had multiple complaints, with difficulty sleeping, decrease appetite, fatigue, generalized weakness. The night of the admission she wake up with left sided chest pain, type pressure, radiating to her back, aggravated by deep breathing, no relieving factors, associated with SOB. At home he BS was high around 250 per family. She was given 15 units of lantus. She was brought to ED. In the ED she was found to have hypoglycemia, as well as elevated BP and decreased oxygen saturation. When seen in the ER, she was moaning with severe CP, in mild to moderate distress, O2 sat aroung 96% on 3L oxygen. Labs in the ER with elevated troponin 0.034, , H/H 8.2/23.9, low calcium,elevated ALT 49, high PTH intact 541.6, low sodium 132. CXR There is bilateral pattern of interstitial and patchy alveolar infiltrates which has been progressing compare to previous admission. She has been admitted for HCAP and R/O ACS.    * No surgery found *      Hospital Course:   83-year-old female with PMH of ESRD on PD, type 2 diabetes mellitus, hypertension, obstructive airway disease, CAD, breast cancer, hyperlipidemia presented with fatigue, lethargy, periodic involuntary jerks found to have  "Pseudomonas pneumonia with uncomplicated parapneumonic effusion.  Underwent left thoracentesis, pleural fluid cultures negative so far.  Respiratory cultures grew pansensitive Pseudomonas.  Patient was seen by pulmonology and recommended 4-6 weeks of antibiotics for Pseudomonas pneumonia.  Patient's symptoms improved but required supplemental oxygen at discharge.  Discharged on levofloxacin 250 mg daily, end date 03/31 for 4 weeks therapy.  Frequency of PD was increased from twice a week to daily as uremia thought to be contributing to periodic involuntary jerks.  With daily PD, involuntary movements resolved.  Antihypertensives initially held given borderline low blood pressures, resumed at lower doses at the time of discharge.  Lasix dose increased to 80 mg b.i.d. per Nephrology recommendation.  Recommend follow-up on blood pressure and uptitrate/restart as deemed necessary.  Patient and family comfortable for discharge home with follow-up in PD clinic and PCP follow-up.    Of note, code status was discussed with the patient.  Patient chose to be a DNR during the hospital stay.    BP (!) 167/72 (BP Location: Right arm, Patient Position: Sitting)   Pulse 101   Temp 98.2 °F (36.8 °C) (Oral)   Resp 18   Ht 5' 3" (1.6 m)   Wt 65.8 kg (145 lb 1 oz)   LMP  (LMP Unknown)   SpO2 96%   BMI 25.70 kg/m²     Physical Exam  Vitals and nursing note reviewed.   Constitutional:       General: She is not in acute distress.  Cardiovascular:      Rate and Rhythm: Normal rate and regular rhythm.      Heart sounds: Normal heart sounds. No murmur heard.  Pulmonary:      Effort: Pulmonary effort is normal. No respiratory distress.   Abdominal:      Palpations: Abdomen is soft.      Tenderness: There is no abdominal tenderness.      Comments: PD catheter in place   Musculoskeletal:      Right lower leg: No edema.      Left lower leg: No edema.   Skin:     General: Skin is warm and dry.      Findings: No bruising.   Neurological: "      General: No focal deficit present.      Mental Status: She is alert.      Comments: Periodic involuntary jerking movements resolved      Goals of Care Treatment Preferences:  Code Status: DNR      Consults:   Consults (From admission, onward)          Status Ordering Provider     Inpatient consult to [facility]  Once        Provider:  (Not yet assigned)    Completed FIDENCIO STEINER     Inpatient consult to Pulmonology  Once        Provider:  (Not yet assigned)    Completed FIDENCIO STEINER     Inpatient consult to Nephrology-[facility] (Rashawn Austin Nimkevych)  Once        Provider:  (Not yet assigned)    Acknowledged CHER MERCADO     [facility]  [facility]        Provider:  (Not yet assigned)    Completed CHER MERCADO              Final Active Diagnoses:    Diagnosis Date Noted POA    PRINCIPAL PROBLEM:  Community acquired pneumonia [J18.9] 03/23/2021 Yes    Pleural effusion on left [J90] 03/07/2024 Yes    Acute encephalopathy [G93.40] 03/06/2024 Yes    Troponin level elevated [R79.89] 03/04/2024 Yes    ALT (SGPT) level raised [R74.01] 03/04/2024 Yes    Pleurodynia [R07.81] 03/04/2024 Yes    Hypocalcemia [E83.51] 02/29/2024 Yes    ESRD on peritoneal dialysis [N18.6, Z99.2] 08/22/2023 Not Applicable    Goals of care, counseling/discussion [Z71.89] 07/08/2022 Not Applicable    Acute hypoxemic respiratory failure [J96.01] 06/21/2022 Yes    Essential hypertension [I10] 06/20/2022 Yes    Hyponatremia [E87.1] 04/02/2022 Yes    Acute on chronic combined systolic and diastolic heart failure [I50.43] 03/23/2021 Yes    Hyperlipidemia associated with type 2 diabetes mellitus [E11.69, E78.5] 07/27/2020 Yes    Weakness [R53.1]  Yes    COPD (chronic obstructive pulmonary disease) [J44.9] 04/04/2018 Yes    Iron deficiency anemia [D50.9] 05/16/2017 Yes    Coronary artery disease involving native coronary artery without angina pectoris - Non-obstructive 50% LAD [I25.10] 07/20/2015 Yes    Type 2 diabetes  "mellitus with stage 4 chronic kidney disease and hypertension [E11.22, I12.9, N18.4] 07/20/2015 Yes      Problems Resolved During this Admission:       Discharged Condition: stable    Disposition: Home or Self Care    Follow Up:   Follow-up Information       [facility] Follow up.    Why: Sched[facility] will contact patient of future follow up appointments.             MyChart Follow up.    Why: Please review MyChart for future follow up appointments.             Home Health Agency Follow up.    Why: Home Health Agency will contact patient to schedule first appointments time/date.                         Patient Instructions:      OXYGEN FOR HOME USE     Order Specific Question Answer Comments   Liter Flow 1    Duration With activity    Qualifying Test Performed at: Activity    Oxygen saturation at rest 96    Oxygen saturation with activity 86    Oxygen saturation with activity on oxygen 91    Portable mode: continuous    Route nasal cannula    Device: home concentrator with portable tanks    Length of need (in months): 3 mos    Patient condition with qualifying saturation Other - List qualifying diagnosis and code    Select a diagnosis & list the code in the comments Pneumonia [303749]    Height: 5' 3" (1.6 m)    Weight: 65.8 kg (145 lb 1 oz)    Alternative treatment measures have been tried or considered and deemed clinically ineffective. Yes      Ambulatory referral/consult to Pulmonology   Standing Status: Future   Referral Priority: Routine Referral Type: Consultation   Referral Reason: Specialty Services Required   Requested Specialty: Pulmonary Disease   Number of Visits Requested: 1       Significant Diagnostic Studies: Labs: BMP:   Recent Labs   Lab 03/11/24  0917   K 3.8   MG 1.8    and CBC No results for input(s): "WBC", "HGB", "HCT", "PLT" in the last 48 hours.    Pending Diagnostic Studies:       Procedure Component Value Units Date/Time    Cholesterol, Body Fluid (Reference Lab) " Pleural Fluid, Left [[id number]] Collected: 03/06/24 1830    Order Status: Sent Lab Status: In process Updated: 03/07/24 0021    Specimen: Body Fluid     Triglyceride, Body Fluid (Reference Lab or Non-[facility]) Pleural Fluid, Left [[id number]] Collected: 03/06/24 1830    Order Status: Sent Lab Status: In process Updated: 03/07/24 0021    Specimen: Body Fluid            Medications:  Reconciled Home Medications:      Medication List        START taking these medications      levoFLOXacin 250 MG tablet  Commonly known as: LEVAQUIN  Take 1 tablet (250 mg total) by mouth once daily. for 20 days            CHANGE how you take these medications      carvediloL 25 MG tablet  Commonly known as: COREG  Take 0.5 tablets (12.5 mg total) by mouth 2 (two) times daily with meals.  What changed:   how much to take  how to take this  when to take this  additional instructions     furosemide 80 MG tablet  Commonly known as: LASIX  Take 1 tablet (80 mg total) by mouth 2 (two) times a day.  What changed:   medication strength  how much to take  when to take this     LANTUS SOLOSTAR U-100 INSULIN glargine 100 units/mL SubQ pen  Generic drug: insulin  Inject 8 Units into the skin every evening.  What changed: how much to take     sevelamer carbonate 800 mg Tab  Commonly known as: RENVELA  Take 2 tablets (1,600 mg total) by mouth 3 (three) times daily.  What changed: how much to take     sodium bicarbonate 650 MG tablet  Take 1 tablet (650 mg total) by mouth 4 (four) times daily.  What changed: when to take this     valsartan 160 MG tablet  Commonly known as: DIOVAN  Take 0.5 tablets (80 mg total) by mouth once daily.  What changed: how much to take            CONTINUE taking these medications      acetaminophen 500 MG tablet  Commonly known as: TYLENOL  Take 500 mg by mouth as needed.     albuterol 90 mcg/actuation inhaler  Commonly known as: PROVENTIL/VENTOLIN HFA  Inhale 2 puffs into the lungs every 6 (six) hours as needed for  "Wheezing. Rescue     albuterol-ipratropium 2.5 mg-0.5 mg/3 mL nebulizer solution  Commonly known as: DUO-NEB  Take 3 mLs by nebulization every 4 (four) hours as needed for Wheezing.     aspirin 81 MG Chew  Take 81 mg by mouth once daily.     blood sugar diagnostic Strp  Commonly known as: ACCU-CHEK HECTOR  Uses Accu-Check Hector meter to test BG 4x/day     calcitRIOL 0.5 MCG Cap  Commonly known as: ROCALTROL  Take 1 capsule (0.5 mcg total) by mouth once daily.     cholecalciferol (vitamin D3) 50 mcg (2,000 unit) Tab  Commonly known as: VITAMIN D3  Take 1 tablet by mouth once daily.     cyanocobalamin 100 MCG tablet  Commonly known as: VITAMIN B-12  Take 100 mcg by mouth once daily.     epoetin [place]-epbx 10,000 unit/mL imjection  Commonly known as: RETACRIT  Inject 1 mL (10,000 Units total) into the skin every 30 days.     ergocalciferol 50,000 unit Cap  Commonly known as: ERGOCALCIFEROL  Take 50,000 Units by mouth every 7 days.     * estradioL 0.01 % (0.1 mg/gram) vaginal cream  Commonly known as: ESTRACE  PLACE 1 GRAM VAGINALLY EVERY OTHER DAY     * estradioL 2 mg (7.5 mcg /24 hour) vaginal ring  Commonly known as: ESTRING  Place 2 mg vaginally every 3 (three) months.     fluticasone propionate 50 mcg/actuation nasal spray  Commonly known as: FLONASE  1 spray by Each Nostril route daily as needed.     lancets Misc  Commonly known as: ACCU-CHEK SOFTCLIX LANCETS  Uses Accu-Chek Hector meter to test BG 2x/day     montelukast 10 mg tablet  Commonly known as: SINGULAIR  Take 1 tablet (10 mg total) by mouth once daily.     nitroGLYCERIN 0.4 MG SL tablet  Commonly known as: NITROSTAT  Place 0.4 mg under the tongue every 5 (five) minutes as needed for Chest pain.     omega-3 fatty acids 500 mg Cap  Take 1 capsule by mouth Daily.     pen needle, diabetic 31 gauge x 3/16" Ndle  Commonly known as: BD ULTRA-FINE MINI PEN NEEDLE  Use with insulin twice a day.     pravastatin 40 MG tablet  Commonly known as: PRAVACHOL  Take 1 " tablet (40 mg total) by mouth once daily.           * This list has 2 medication(s) that are the same as other medications prescribed for you. Read the directions carefully, and ask your doctor or other care provider to review them with you.                STOP taking these medications      amoxicillin-clavulanate 250-125mg 250-125 mg Tab  Commonly known as: AUGMENTIN     hydrALAZINE 100 MG tablet  Commonly known as: APRESOLINE     OZEMPIC 0.25 mg or 0.5 mg (2 mg/3 mL) pen injector  Generic drug: semaglutide     TRULICITY 1.5 mg/0.5 mL pen injector  Generic drug: dulaglutide              Indwelling Lines/Drains at time of discharge:   Lines/Drains/Airways       None                   Time spent on the discharge of patient: 38 minutes         Charlotte Villalobos MD  Department of Hospital Medicine  [facility]

## 2024-03-11 NOTE — NURSING
Home Oxygen Evaluation    Date Performed: 3/11/2024    1) Patient's Home O2 Sat on room air, while at rest: 96%        If O2 sats on room air at rest are 88% or below, patient qualifies. No additional testing needed. Document N/A in steps 2 and 3. If 89% or above, complete steps 2.      2) Patient's O2 Sat on room air while exercisin%        If O2 sats on room air while exercising remain 89% or above patient does not qualify, no further testing needed Document N/A in step 3. If O2 sats on room air while exercising are 88% or below, continue to step 3.      3) Patient's O2 Sat while exercising on O2: 91 at 1 LPM         (Must show improvement from #2 for patients to qualify)    If O2 sats improve on oxygen, patient qualifies for portable oxygen. If not, the patient does not qualify.         Anesthesia Volume In Cc (Will Not Render If 0): 0.5

## 2024-03-12 ENCOUNTER — HOSPITAL ENCOUNTER (OUTPATIENT)
Facility: HOSPITAL | Age: 84
Discharge: HOME HEALTH | End: 2024-03-14
Attending: EMERGENCY MEDICINE | Admitting: FAMILY MEDICINE
Payer: MEDICARE

## 2024-03-12 ENCOUNTER — NURSE TRIAGE (OUTPATIENT)
Dept: ADMINISTRATIVE | Facility: CLINIC | Age: 84
End: 2024-03-12
Payer: MEDICARE

## 2024-03-12 DIAGNOSIS — D63.1 ANEMIA DUE TO CHRONIC KIDNEY DISEASE, UNSPECIFIED CKD STAGE: ICD-10-CM

## 2024-03-12 DIAGNOSIS — N18.9 ANEMIA DUE TO CHRONIC KIDNEY DISEASE, UNSPECIFIED CKD STAGE: ICD-10-CM

## 2024-03-12 DIAGNOSIS — R55 SYNCOPE, UNSPECIFIED SYNCOPE TYPE: Primary | ICD-10-CM

## 2024-03-12 LAB
ABO + RH BLD: NORMAL
ALBUMIN SERPL BCP-MCNC: 1.8 G/DL (ref 3.5–5.2)
ALP SERPL-CCNC: 89 U/L (ref 55–135)
ALT SERPL W/O P-5'-P-CCNC: 33 U/L (ref 10–44)
ANION GAP SERPL CALC-SCNC: 18 MMOL/L (ref 8–16)
AST SERPL-CCNC: 38 U/L (ref 10–40)
BACTERIA FLD CULT: NORMAL
BASOPHILS # BLD AUTO: 0.02 K/UL (ref 0–0.2)
BASOPHILS NFR BLD: 0.2 % (ref 0–1.9)
BILIRUB SERPL-MCNC: 0.3 MG/DL (ref 0.1–1)
BLD GP AB SCN CELLS X3 SERPL QL: NORMAL
BUN SERPL-MCNC: 49 MG/DL (ref 8–23)
CALCIUM SERPL-MCNC: 6.8 MG/DL (ref 8.7–10.5)
CHLORIDE SERPL-SCNC: 93 MMOL/L (ref 95–110)
CO2 SERPL-SCNC: 28 MMOL/L (ref 23–29)
CREAT SERPL-MCNC: 4.8 MG/DL (ref 0.5–1.4)
DAT IGG-SP REAG RBC-IMP: NORMAL
DIFFERENTIAL METHOD BLD: ABNORMAL
EOSINOPHIL # BLD AUTO: 0.2 K/UL (ref 0–0.5)
EOSINOPHIL NFR BLD: 1.2 % (ref 0–8)
ERYTHROCYTE [DISTWIDTH] IN BLOOD BY AUTOMATED COUNT: 13.8 % (ref 11.5–14.5)
EST. GFR  (NO RACE VARIABLE): 8 ML/MIN/1.73 M^2
GLUCOSE SERPL-MCNC: 139 MG/DL (ref 70–110)
HCT VFR BLD AUTO: 21.6 % (ref 37–48.5)
HGB BLD-MCNC: 6.7 G/DL (ref 12–16)
IMM GRANULOCYTES # BLD AUTO: 0.2 K/UL (ref 0–0.04)
IMM GRANULOCYTES NFR BLD AUTO: 1.7 % (ref 0–0.5)
LYMPHOCYTES # BLD AUTO: 1.2 K/UL (ref 1–4.8)
LYMPHOCYTES NFR BLD: 10.2 % (ref 18–48)
MAGNESIUM SERPL-MCNC: 1.7 MG/DL (ref 1.6–2.6)
MCH RBC QN AUTO: 31.3 PG (ref 27–31)
MCHC RBC AUTO-ENTMCNC: 31 G/DL (ref 32–36)
MCV RBC AUTO: 101 FL (ref 82–98)
MONOCYTES # BLD AUTO: 0.7 K/UL (ref 0.3–1)
MONOCYTES NFR BLD: 5.6 % (ref 4–15)
NEUTROPHILS # BLD AUTO: 9.8 K/UL (ref 1.8–7.7)
NEUTROPHILS NFR BLD: 81.1 % (ref 38–73)
NRBC BLD-RTO: 0 /100 WBC
PLATELET # BLD AUTO: 160 K/UL (ref 150–450)
PMV BLD AUTO: 10.2 FL (ref 9.2–12.9)
POCT GLUCOSE: 137 MG/DL (ref 70–110)
POCT GLUCOSE: 158 MG/DL (ref 70–110)
POCT GLUCOSE: 160 MG/DL (ref 70–110)
POCT GLUCOSE: 180 MG/DL (ref 70–110)
POTASSIUM SERPL-SCNC: 3.8 MMOL/L (ref 3.5–5.1)
PROT SERPL-MCNC: 5.7 G/DL (ref 6–8.4)
RBC # BLD AUTO: 2.14 M/UL (ref 4–5.4)
SODIUM SERPL-SCNC: 139 MMOL/L (ref 136–145)
SPECIMEN OUTDATE: NORMAL
TROPONIN I SERPL DL<=0.01 NG/ML-MCNC: 0.05 NG/ML (ref 0–0.03)
WBC # BLD AUTO: 12.12 K/UL (ref 3.9–12.7)

## 2024-03-12 PROCEDURE — 25000003 PHARM REV CODE 250: Performed by: FAMILY MEDICINE

## 2024-03-12 PROCEDURE — G0378 HOSPITAL OBSERVATION PER HR: HCPCS

## 2024-03-12 PROCEDURE — P9021 RED BLOOD CELLS UNIT: HCPCS | Performed by: EMERGENCY MEDICINE

## 2024-03-12 PROCEDURE — 86901 BLOOD TYPING SEROLOGIC RH(D): CPT | Performed by: EMERGENCY MEDICINE

## 2024-03-12 PROCEDURE — 86920 COMPATIBILITY TEST SPIN: CPT | Performed by: EMERGENCY MEDICINE

## 2024-03-12 PROCEDURE — 36430 TRANSFUSION BLD/BLD COMPNT: CPT

## 2024-03-12 PROCEDURE — 83735 ASSAY OF MAGNESIUM: CPT | Performed by: EMERGENCY MEDICINE

## 2024-03-12 PROCEDURE — 99285 EMERGENCY DEPT VISIT HI MDM: CPT | Mod: 25

## 2024-03-12 PROCEDURE — 93005 ELECTROCARDIOGRAM TRACING: CPT

## 2024-03-12 PROCEDURE — 86850 RBC ANTIBODY SCREEN: CPT | Performed by: EMERGENCY MEDICINE

## 2024-03-12 PROCEDURE — 82962 GLUCOSE BLOOD TEST: CPT | Mod: 91

## 2024-03-12 PROCEDURE — 86880 COOMBS TEST DIRECT: CPT | Performed by: EMERGENCY MEDICINE

## 2024-03-12 PROCEDURE — 96372 THER/PROPH/DIAG INJ SC/IM: CPT | Performed by: FAMILY MEDICINE

## 2024-03-12 PROCEDURE — 25000003 PHARM REV CODE 250: Performed by: EMERGENCY MEDICINE

## 2024-03-12 PROCEDURE — 27000221 HC OXYGEN, UP TO 24 HOURS

## 2024-03-12 PROCEDURE — 84484 ASSAY OF TROPONIN QUANT: CPT | Performed by: EMERGENCY MEDICINE

## 2024-03-12 PROCEDURE — 80053 COMPREHEN METABOLIC PANEL: CPT | Performed by: EMERGENCY MEDICINE

## 2024-03-12 PROCEDURE — 99900035 HC TECH TIME PER 15 MIN (STAT)

## 2024-03-12 PROCEDURE — 93010 ELECTROCARDIOGRAM REPORT: CPT | Mod: ,,, | Performed by: INTERNAL MEDICINE

## 2024-03-12 PROCEDURE — 63600175 PHARM REV CODE 636 W HCPCS: Performed by: FAMILY MEDICINE

## 2024-03-12 PROCEDURE — 96360 HYDRATION IV INFUSION INIT: CPT

## 2024-03-12 PROCEDURE — 85025 COMPLETE CBC W/AUTO DIFF WBC: CPT | Mod: 91 | Performed by: EMERGENCY MEDICINE

## 2024-03-12 PROCEDURE — 94761 N-INVAS EAR/PLS OXIMETRY MLT: CPT

## 2024-03-12 RX ORDER — SODIUM CHLORIDE 0.9 % (FLUSH) 0.9 %
10 SYRINGE (ML) INJECTION EVERY 12 HOURS PRN
Status: DISCONTINUED | OUTPATIENT
Start: 2024-03-12 | End: 2024-03-14 | Stop reason: HOSPADM

## 2024-03-12 RX ORDER — POLYETHYLENE GLYCOL 3350 17 G/17G
17 POWDER, FOR SOLUTION ORAL DAILY
Status: DISCONTINUED | OUTPATIENT
Start: 2024-03-12 | End: 2024-03-14 | Stop reason: HOSPADM

## 2024-03-12 RX ORDER — LEVOFLOXACIN 250 MG/1
250 TABLET ORAL DAILY
Status: DISCONTINUED | OUTPATIENT
Start: 2024-03-13 | End: 2024-03-12

## 2024-03-12 RX ORDER — GLUCAGON 1 MG
1 KIT INJECTION
Status: DISCONTINUED | OUTPATIENT
Start: 2024-03-12 | End: 2024-03-14 | Stop reason: HOSPADM

## 2024-03-12 RX ORDER — IPRATROPIUM BROMIDE AND ALBUTEROL SULFATE 2.5; .5 MG/3ML; MG/3ML
3 SOLUTION RESPIRATORY (INHALATION) EVERY 4 HOURS PRN
Status: DISCONTINUED | OUTPATIENT
Start: 2024-03-12 | End: 2024-03-14 | Stop reason: HOSPADM

## 2024-03-12 RX ORDER — ONDANSETRON 8 MG/1
8 TABLET, ORALLY DISINTEGRATING ORAL EVERY 8 HOURS PRN
Status: DISCONTINUED | OUTPATIENT
Start: 2024-03-12 | End: 2024-03-14 | Stop reason: HOSPADM

## 2024-03-12 RX ORDER — PNV NO.95/FERROUS FUM/FOLIC AC 28MG-0.8MG
100 TABLET ORAL DAILY
Status: DISCONTINUED | OUTPATIENT
Start: 2024-03-13 | End: 2024-03-14 | Stop reason: HOSPADM

## 2024-03-12 RX ORDER — HYDROCODONE BITARTRATE AND ACETAMINOPHEN 500; 5 MG/1; MG/1
TABLET ORAL
Status: DISCONTINUED | OUTPATIENT
Start: 2024-03-12 | End: 2024-03-14 | Stop reason: HOSPADM

## 2024-03-12 RX ORDER — LEVOFLOXACIN 500 MG/1
500 TABLET, FILM COATED ORAL EVERY OTHER DAY
Status: DISCONTINUED | OUTPATIENT
Start: 2024-03-13 | End: 2024-03-14 | Stop reason: HOSPADM

## 2024-03-12 RX ORDER — FUROSEMIDE 40 MG/1
80 TABLET ORAL 2 TIMES DAILY
Status: DISCONTINUED | OUTPATIENT
Start: 2024-03-12 | End: 2024-03-14 | Stop reason: HOSPADM

## 2024-03-12 RX ORDER — HEPARIN SODIUM 5000 [USP'U]/ML
5000 INJECTION, SOLUTION INTRAVENOUS; SUBCUTANEOUS EVERY 8 HOURS
Status: DISCONTINUED | OUTPATIENT
Start: 2024-03-12 | End: 2024-03-12

## 2024-03-12 RX ORDER — IBUPROFEN 200 MG
16 TABLET ORAL
Status: DISCONTINUED | OUTPATIENT
Start: 2024-03-12 | End: 2024-03-14 | Stop reason: HOSPADM

## 2024-03-12 RX ORDER — VALSARTAN 80 MG/1
80 TABLET ORAL DAILY
COMMUNITY
Start: 2024-03-04 | End: 2024-03-12 | Stop reason: DRUGHIGH

## 2024-03-12 RX ORDER — CALCITRIOL 0.25 UG/1
0.5 CAPSULE ORAL DAILY
Status: DISCONTINUED | OUTPATIENT
Start: 2024-03-13 | End: 2024-03-14 | Stop reason: HOSPADM

## 2024-03-12 RX ORDER — HEPARIN SODIUM 5000 [USP'U]/ML
5000 INJECTION, SOLUTION INTRAVENOUS; SUBCUTANEOUS EVERY 12 HOURS
Status: DISCONTINUED | OUTPATIENT
Start: 2024-03-12 | End: 2024-03-12

## 2024-03-12 RX ORDER — NAPROXEN SODIUM 220 MG/1
81 TABLET, FILM COATED ORAL DAILY
Status: DISCONTINUED | OUTPATIENT
Start: 2024-03-13 | End: 2024-03-14 | Stop reason: HOSPADM

## 2024-03-12 RX ORDER — PRAVASTATIN SODIUM 40 MG/1
40 TABLET ORAL DAILY
Status: DISCONTINUED | OUTPATIENT
Start: 2024-03-13 | End: 2024-03-14 | Stop reason: HOSPADM

## 2024-03-12 RX ORDER — SODIUM CHLORIDE 9 MG/ML
1000 INJECTION, SOLUTION INTRAVENOUS
Status: COMPLETED | OUTPATIENT
Start: 2024-03-12 | End: 2024-03-12

## 2024-03-12 RX ORDER — ACETAMINOPHEN 325 MG/1
650 TABLET ORAL EVERY 4 HOURS PRN
Status: DISCONTINUED | OUTPATIENT
Start: 2024-03-12 | End: 2024-03-14 | Stop reason: HOSPADM

## 2024-03-12 RX ORDER — IBUPROFEN 200 MG
24 TABLET ORAL
Status: DISCONTINUED | OUTPATIENT
Start: 2024-03-12 | End: 2024-03-14 | Stop reason: HOSPADM

## 2024-03-12 RX ORDER — SODIUM BICARBONATE 650 MG/1
650 TABLET ORAL 4 TIMES DAILY
Status: DISCONTINUED | OUTPATIENT
Start: 2024-03-12 | End: 2024-03-14 | Stop reason: HOSPADM

## 2024-03-12 RX ORDER — SEVELAMER CARBONATE 800 MG/1
1600 TABLET, FILM COATED ORAL 3 TIMES DAILY
Status: DISCONTINUED | OUTPATIENT
Start: 2024-03-12 | End: 2024-03-14 | Stop reason: HOSPADM

## 2024-03-12 RX ORDER — CHOLECALCIFEROL (VITAMIN D3) 25 MCG
2000 TABLET ORAL DAILY
Status: DISCONTINUED | OUTPATIENT
Start: 2024-03-13 | End: 2024-03-14 | Stop reason: HOSPADM

## 2024-03-12 RX ORDER — VALSARTAN 320 MG/1
160 TABLET ORAL DAILY
Status: ON HOLD | COMMUNITY
End: 2024-03-14 | Stop reason: HOSPADM

## 2024-03-12 RX ORDER — ERGOCALCIFEROL 1.25 MG/1
50000 CAPSULE ORAL
Status: DISCONTINUED | OUTPATIENT
Start: 2024-03-13 | End: 2024-03-14 | Stop reason: HOSPADM

## 2024-03-12 RX ORDER — TALC
6 POWDER (GRAM) TOPICAL NIGHTLY PRN
Status: DISCONTINUED | OUTPATIENT
Start: 2024-03-12 | End: 2024-03-14 | Stop reason: HOSPADM

## 2024-03-12 RX ORDER — INSULIN ASPART 100 [IU]/ML
1-10 INJECTION, SOLUTION INTRAVENOUS; SUBCUTANEOUS
Status: DISCONTINUED | OUTPATIENT
Start: 2024-03-12 | End: 2024-03-14 | Stop reason: HOSPADM

## 2024-03-12 RX ORDER — FLUTICASONE PROPIONATE 50 MCG
1 SPRAY, SUSPENSION (ML) NASAL DAILY
Status: DISCONTINUED | OUTPATIENT
Start: 2024-03-13 | End: 2024-03-14 | Stop reason: HOSPADM

## 2024-03-12 RX ORDER — NALOXONE HCL 0.4 MG/ML
0.02 VIAL (ML) INJECTION
Status: DISCONTINUED | OUTPATIENT
Start: 2024-03-12 | End: 2024-03-14 | Stop reason: HOSPADM

## 2024-03-12 RX ADMIN — INSULIN ASPART 1 UNITS: 100 INJECTION, SOLUTION INTRAVENOUS; SUBCUTANEOUS at 09:03

## 2024-03-12 RX ADMIN — FUROSEMIDE 80 MG: 40 TABLET ORAL at 09:03

## 2024-03-12 RX ADMIN — SODIUM BICARBONATE 650 MG: 650 TABLET ORAL at 09:03

## 2024-03-12 RX ADMIN — SODIUM BICARBONATE 650 MG: 650 TABLET ORAL at 04:03

## 2024-03-12 RX ADMIN — SEVELAMER CARBONATE 1600 MG: 800 TABLET, FILM COATED ORAL at 09:03

## 2024-03-12 RX ADMIN — SODIUM CHLORIDE 1000 ML: 9 INJECTION, SOLUTION INTRAVENOUS at 01:03

## 2024-03-12 NOTE — HPI
83-year-old female with PMH of ESRD on PD, type 2 diabetes mellitus, hypertension, obstructive airway disease, CAD, breast cancer, hyperlipidemia brought to the ER for syncopal episode by family. Patient had gotten up to go to the bathroom with the help of her family. Got up and noticed to have LOC and tonic clonic jerking reported.  Pt was out for possibly 5min. Noted to have similar episodes on the last admission. Was discharged yesterday for pneumonia with pleural effusion. . EMS arrived shortly after and noted the hypotension with sbps in the 80s.Pt was supposed to take valsartan 80mg but didn't get the prescription. Daughter cut the medication to 160 from 380mg. . Denies any CP or sob. No other symptoms reported at this time     presented with fatigue, lethargy, periodic involuntary jerks found to have Pseudomonas pneumonia with uncomplicated parapneumonic effusion. Underwent left thoracentesis, pleural fluid cultures negative so far. Respiratory cultures grew pansensitive Pseudomonas. Patient was seen by pulmonology and recommended 4-6 weeks of antibiotics for Pseudomonas pneumonia. Patient's symptoms improved but required supplemental oxygen at discharge. Discharged on levofloxacin 250 mg daily, end date 03/31 for 4 weeks therapy. Frequency of PD was increased from twice a week to daily as uremia thought to be contributing to periodic involuntary jerks. With daily PD, involuntary movements resolved. Antihypertensives initially held given borderline low blood pressures, resumed at lower doses at the time of discharge.      CXR in the ER showed edema with left pleural effusion and hb of 6.7. Hypotension with sbp in the 90 in the ER

## 2024-03-12 NOTE — ASSESSMENT & PLAN NOTE
Patient's anemia is currently uncontrolled. Has received 1 units of PRBCs on 3/12/24 . Etiology likely d/t chronic disease due to ESRD  Current CBC reviewed-   Lab Results   Component Value Date    HGB 6.7 (L) 03/12/2024    HCT 21.6 (L) 03/12/2024     Monitor serial CBC and transfuse if patient becomes hemodynamically unstable, symptomatic or H/H drops below 7/21.

## 2024-03-12 NOTE — TELEPHONE ENCOUNTER
Myesha Meyer's daughter states medication that was prescrbed and on AVS is different than meds that pt has at home. Dc'd home on 3/11/24 s/p Pneumonia. Pt has previously prescribed Valsartan 320 mg at home. AVS shows pt should be taking Valsartan 80 mg once daily but no prescription was received or sent to pharmacy. Daughter requesting new Rx for Valsartan be sent to [facility] [place]. Call escalated per triage nurse decision to contact on call hospital medicine.     Spoke to Dr. Sid Cárdenas, on call Hospitalist recommends pt check BP, if BP is 130 or higher, give Valsartan 160 mg. F/u with PCP & Nephrologist asap for further eval of BP and medication dose.     Updated Betsy per Dr. Cárdenas's verbal advice. Betsy v/u.      Reason for Disposition   Caller has URGENT medicine question about med that PCP or specialist prescribed and triager unable to answer question    Additional Information   Negative: Intentional drug overdose and suicidal thoughts or ideas   Negative: MORE THAN A DOUBLE DOSE of a prescription or over-the-counter (OTC) drug   Negative: DOUBLE DOSE (an extra dose or lesser amount) of prescription drug and any symptoms (e.g., dizziness, nausea, pain, sleepiness)   Negative: DOUBLE DOSE (an extra dose or lesser amount) of over-the-counter (OTC) drug and any symptoms (e.g., dizziness, nausea, pain, sleepiness)   Negative: Took another person's prescription drug   Negative: DOUBLE DOSE (an extra dose or lesser amount) of prescription drug and NO symptoms  (Exception: A double dose of antibiotics.)   Negative: Diabetes drug error or overdose (e.g., took wrong type of insulin or took extra dose)   Negative: Caller has medication question about med NOT prescribed by PCP and triager unable to answer question (e.g., compatibility with other med, storage)   Negative: Prescription not at pharmacy and was prescribed by PCP recently  (Exception: triager has access to EMR and prescription  is recorded there. Go to Home Care and confirm for pharmacy.)   Negative: Pharmacy calling with prescription question and triager unable to answer question    Protocols used: Medication Question Call-A-[place]

## 2024-03-12 NOTE — ED NOTES
Patient transported to room 479 with all belongings on cardia monitor and 2L nasal cannula oxygen in stable condition with family at bedside.

## 2024-03-12 NOTE — ASSESSMENT & PLAN NOTE
Creatine stable for now. BMP reviewed- noted Estimated Creatinine Clearance: 7.2 mL/min (A) (based on SCr of 4.8 mg/dL (H)). according to latest data. 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  Monitor UOP and serial BMP and adjust therapy as needed. Renally dose meds. Avoid nephrotoxic medications and procedures.

## 2024-03-12 NOTE — ED PROVIDER NOTES
Encounter Date: 3/12/2024       History     Chief Complaint   Patient presents with    near syncope     Brought to ED from home for near syncope when attempting to stand to go to the bathroom. Last PD was 2 days ago.      82 y/o F brought to the ER for syncopal episode. Patient had gotten up to go to the bathroom and had witnessed LOC. No tonic clonic jerking reported, patient quickly returned to her baseline mental status. Denies any head injury or pain at this time. Notes some mild SOB that is unchanged from her recent discharge, is on O2 at home for recent PNA. Deneis any CP. No other symptoms reported at this time.       Review of patient's allergies indicates:   Allergen Reactions    Iodine and iodide containing products Hives and Other (See Comments)     Not specified     Nifedipine      weakness     Past Medical History:   Diagnosis Date    Acute encephalopathy 2/29/2024    Acute hypoxemic respiratory failure 12/19/2019    Acute right-sided thoracic back pain 12/09/2019    Allergy     Asthma     Basal cell carcinoma     left forehead    Basal cell carcinoma     left nose    Basal cell carcinoma 05/20/2015    right nose    Basal cell carcinoma 12/22/2015    left lower post neck    Basal cell carcinoma 12/03/2019    left ant scalp     BCC (basal cell carcinoma of skin) 10/20/2022    Right alar groove    Bilateral pleural effusion     Bilateral renal cysts     Breast cancer     CAD (coronary artery disease)     Cardiomyopathy     Cardiomyopathy, ischemic     Cataract     Chest pain 3/4/2024    CHF (congestive heart failure)     CKD (chronic kidney disease) stage 4, GFR 15-29 ml/min     Colon polyp 2011    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, with long-term current use of insulin 02/22/2018    COPD (chronic obstructive pulmonary disease)     COPD exacerbation 04/08/2018    Current mild episode of major depressive disorder without prior  episode 01/25/2022    Defibrillator discharge     Dependence on renal dialysis 08/22/2023    Diabetes mellitus     Diabetes mellitus type II     Diabetes with neurologic complications     Edema     ESRD needing dialysis     Goiter     MNG    Hematuria, unspecified     HX: breast cancer     Hyperlipidemia     Hypertension     Hypocalcemia     Hypokalemia     Hyponatremia     Hyponatremia 04/02/2022    Iron deficiency anemia 05/16/2017    Iron deficiency anemia     Iron deficiency anemia     Left kidney mass     Meningioma     Microalbuminuria due to type 2 diabetes mellitus 01/26/2022    Osteoporosis, postmenopausal     Pneumonia 12/08/2019    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    Proteinuria 01/21/2019    Pseudomonas pneumonia     SCC (squamous cell carcinoma) 08/25/2022    right posterior neck    Skin cancer     s/p excision    Sleep apnea     CPAP    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/2015    mid forehead    Unspecified vitamin D deficiency     UTI (urinary tract infection) 04/02/2022    Ventricular tachycardia     Vitamin B12 deficiency     Vitamin D deficiency disease      Past Surgical History:   Procedure Laterality Date    BASAL CELL CARCINOMA EXCISION      posterior neck and nose    BREAST BIOPSY      BREAST CYST EXCISION Left     BREAST SURGERY      CARDIAC DEFIBRILLATOR PLACEMENT      x 2    CATARACT EXTRACTION W/  INTRAOCULAR LENS IMPLANT Bilateral     CHOLECYSTECTOMY      COLONOSCOPY N/A 11/5/2019    Procedure: COLONOSCOPY;  Surgeon: Boaz Botello MD;  Location: [address]);  Service: Endoscopy;  Laterality: N/A;  [facility] - [facility] -     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    FRACTURE SURGERY      left elbow and wrist as a child    HYSTERECTOMY      INSERTION, CATHETER, DIALYSIS, PERITONEAL, LAPAROSCOPIC N/A 4/25/2023    Procedure: INSERTION, CATHETER, DIALYSIS, PERITONEAL, LAPAROSCOPIC;  Surgeon: Marcelo Isaac MD;  Location: [facility];  Service: General;  Laterality: N/A;     LAPAROSCOPIC LYSIS OF ADHESIONS N/A 4/25/2023    Procedure: LYSIS, ADHESIONS, LAPAROSCOPIC;  Surgeon: Marcelo Isaac MD;  Location: [facility] OR;  Service: General;  Laterality: N/A;    MASTECTOMY Right     OMENTOPEXY, LAPAROSCOPIC N/A 4/25/2023    Procedure: OMENTOPEXY, LAPAROSCOPIC;  Surgeon: Marcelo Isaac MD;  Location: [facility] OR;  Service: General;  Laterality: N/A;    REPLACEMENT OF IMPLANTABLE CARDIOVERTER-DEFIBRILLATOR (ICD) GENERATOR N/A 12/17/2018    Procedure: REPLACEMENT, ICD GENERATOR;  Surgeon: Jan Mckeon MD;  Location: [facility] EP LAB;  Service: Cardiology;  Laterality: N/A;  DONNA, CRT-D gen sulema, MDT, MAC, SK, 3 Prep    REVISION OF SKIN POCKET FOR CARDIOVERTER-DEFIBRILLATOR  12/17/2018    Procedure: Revision, Skin Pocket, For Cardioverter-Defibrillator;  Surgeon: Jan Mckeon MD;  Location: [facility] EP LAB;  Service: Cardiology;;    SQUAMOUS CELL CARCINOMA EXCISION      remved from forehead    TONSILLECTOMY       Family History   Problem Relation Age of Onset    Asthma Mother     Hypertension Mother     Stroke Mother     Diabetes Father     Cardiomyopathy Father     Diabetes Sister     Heart disease Sister     Heart attack Sister     Heart attack Brother     Diabetes Brother     Heart disease Brother     Hypertension Brother     Diabetes Brother     Heart disease Brother     Hypertension Brother     Cerebral aneurysm Brother     Diabetes Brother     Heart disease Brother     Cancer Brother         colon    Diabetes Brother     Diabetes Daughter         prediabetes    Cancer Daughter         melanoma    Obesity Daughter     Melanoma Daughter     Cancer Son         skin    Diabetes Son         prediabetes    Diabetes Son     No Known Problems Maternal Grandmother     No Known Problems Maternal Grandfather     No Known Problems Paternal Grandmother     No Known Problems Paternal Grandfather     Amblyopia Neg Hx     Blindness Neg Hx     Cataracts Neg Hx     Glaucoma Neg Hx     Macular degeneration Neg  Hx     Retinal detachment Neg Hx     Strabismus Neg Hx     Thyroid disease Neg Hx      Social History     Tobacco Use    Smoking status: Never     Passive exposure: Never    Smokeless tobacco: Never   Substance Use Topics    Alcohol use: No     Alcohol/week: 0.0 standard drinks of alcohol    Drug use: No     Review of Systems   Constitutional:  Negative for chills and fever.   HENT:  Negative for congestion.    Respiratory:  Positive for cough and shortness of breath.    Cardiovascular:  Negative for chest pain.   Gastrointestinal:  Negative for abdominal pain.   Musculoskeletal:  Negative for back pain.   Neurological:  Positive for syncope and light-headedness. Negative for headaches.       Physical Exam     Initial Vitals [03/12/24 1306]   BP Pulse Resp Temp SpO2   90/64 (!) 50 18 98.7 °F (37.1 °C) 100 %      MAP       --         Physical Exam    Nursing note and vitals reviewed.  Constitutional: She appears well-developed and well-nourished. No distress.   HENT:   Head: Normocephalic and atraumatic.   Eyes: Conjunctivae and EOM are normal. Pupils are equal, round, and reactive to light.   Neck: Neck supple. No tracheal deviation present.   Normal range of motion.  Cardiovascular:  Normal rate and intact distal pulses.           Pulmonary/Chest: No respiratory distress.   Abdominal: Abdomen is soft. She exhibits no distension. There is no abdominal tenderness.   Musculoskeletal:         General: No tenderness. Normal range of motion.      Cervical back: Normal range of motion and neck supple.     Neurological: She is alert and oriented to person, place, and time. She has normal strength. No cranial nerve deficit. GCS score is 15. GCS eye subscore is 4. GCS verbal subscore is 5. GCS motor subscore is 6.   Skin: Skin is warm and dry.         ED Course   Critical Care    Date/Time: 3/12/2024 2:50 PM    Performed by: Arsenio Islas MD  Authorized by: Arsenio Islas MD  Direct patient critical care time:  15 minutes  Additional history critical care time: 15 minutes  Ordering / reviewing critical care time: 15 minutes  Documentation critical care time: 15 minutes  Consulting other physicians critical care time: 15 minutes  Consult with family critical care time: 10 minutes  Total critical care time (exclusive of procedural time) : 85 minutes  Critical care time was exclusive of separately billable procedures and treating other patients.  Critical care was necessary to treat or prevent imminent or life-threatening deterioration of the following conditions: circulatory failure.  Critical care was time spent personally by me on the following activities: development of treatment plan with patient or surrogate, interpretation of cardiac output measurements, evaluation of patient's response to treatment, examination of patient, obtaining history from patient or surrogate, ordering and performing treatments and interventions, ordering and review of laboratory studies, ordering and review of radiographic studies, pulse oximetry, re-evaluation of patient's condition and review of old charts.        Labs Reviewed   CBC W/ AUTO DIFFERENTIAL - Abnormal; Notable for the following components:       Result Value    RBC 2.14 (*)     Hemoglobin 6.7 (*)     Hematocrit 21.6 (*)      (*)     MCH 31.3 (*)     MCHC 31.0 (*)     Immature Granulocytes 1.7 (*)     Gran # (ANC) 9.8 (*)     Immature Grans (Abs) 0.20 (*)     Gran % 81.1 (*)     Lymph % 10.2 (*)     All other components within normal limits   COMPREHENSIVE METABOLIC PANEL - Abnormal; Notable for the following components:    Chloride 93 (*)     Glucose 139 (*)     BUN 49 (*)     Creatinine 4.8 (*)     Calcium 6.8 (*)     Total Protein 5.7 (*)     Albumin 1.8 (*)     eGFR 8 (*)     Anion Gap 18 (*)     All other components within normal limits    Narrative:     Calcium critical result(s) called and verbal readback obtained from   Yony Clark RN. by [email or web address] 03/12/2024 14:14    TROPONIN I - Abnormal; Notable for the following components:    Troponin I 0.048 (*)     All other components within normal limits   POCT GLUCOSE - Abnormal; Notable for the following components:    POCT Glucose 158 (*)     All other components within normal limits   MAGNESIUM   TYPE & SCREEN   POCT GLUCOSE MONITORING CONTINUOUS   POCT GLUCOSE MONITORING CONTINUOUS   PREPARE RBC SOFT     EKG Readings: (Independently Interpreted)   Initial Reading: No STEMI. Previous EKG: Compared with most recent EKG Previous EKG Date: 3/4/2024 (Minimal change). Rhythm: Paced Rhythm. Heart Rate: 50. Ectopy: No Ectopy. ST Segments: Normal ST Segments. Axis: Normal.   EKG independently interpreted by me pending cardiology review      ECG Results              EKG 12-lead (In process)        Collection Time Result Time QRS Duration OHS QTC Calculation    03/12/24 13:20:04 03/12/24 15:01:22 124 466                     In process by Interface, Lab In [facility] (03/12/24 15:01:30)                   Narrative:    Test Reason : R55,    Vent. Rate : 050 BPM     Atrial Rate : 047 BPM     P-R Int : 000 ms          QRS Dur : 124 ms      QT Int : 512 ms       P-R-T Axes : 000 -69 129 degrees     QTc Int : 466 ms    Wide QRS rhythm  Left axis deviation  Right bundle branch block  Cannot rule out Anterior infarct ,age undetermined  Abnormal ECG  When compared with ECG of 04-MAR-2024 16:07,  Wide QRS rhythm has replaced Electronic ventricular pacemaker    Referred By: [email or web address]   SELF           Confirmed By:                                       X-Rays:   Independently Interpreted Readings:   Other Readings:  CXR Independently interpreted by me pending radiology review: LLL infiltrate persists     Imaging Results              US Carotid Bilateral (In process)                      X-Ray Chest AP Portable (Final result)  Result time 03/12/24 14:19:25      Final result by Nathanael Moralez MD (03/12/24 14:19:25)                   Impression:      1.  Interstitial findings suggest edema noting left pleural effusion.  Left lower lung zone consolidation remains.      Electronically signed by: Nathanael Moralez MD  Date:    03/12/2024  Time:    14:19               Narrative:    EXAMINATION:  XR CHEST AP PORTABLE    CLINICAL HISTORY:  Near syncope;    TECHNIQUE:  Single frontal view of the chest was performed.    COMPARISON:  03/06/2024, CT 03/06/2024    FINDINGS:  The cardiomediastinal silhouette is prominent noting magnification by technique, stable.  There is calcification of the aorta.  Left chest wall pacer noted..  There is obscuration of the left costophrenic angle suggesting effusion..  The trachea is midline.  The lungs are symmetrically expanded bilaterally with coarse interstitial attenuation bilaterally.  There is increased parenchymal attenuation projected over the left lower lung zone..  There is no pneumothorax.  The osseous structures are remarkable for degenerative change..  There are remote appearing right rib injuries.                                      Medications   0.9%  NaCl infusion (for blood administration) (has no administration in time range)   albuterol-ipratropium 2.5 mg-0.5 mg/3 mL nebulizer solution 3 mL (has no administration in time range)   aspirin chewable tablet 81 mg (has no administration in time range)   calcitRIOL capsule 0.5 mcg (has no administration in time range)   cholecalciferol (vitamin D3) tablet 2,000 Units (has no administration in time range)   cyanocobalamin tablet 100 mcg (has no administration in time range)   ergocalciferol capsule 50,000 Units (has no administration in time range)   fluticasone propionate 50 mcg/actuation nasal spray 50 mcg (has no administration in time range)   furosemide tablet 80 mg (has no administration in time range)   levoFLOXacin tablet 250 mg (has no administration in time range)   pravastatin tablet 40 mg (has no administration in time range)   sevelamer carbonate tablet 1,600 mg (has  no administration in time range)   sodium bicarbonate tablet 650 mg (has no administration in time range)   insulin aspart U-100 pen 1-10 Units (has no administration in time range)   glucose chewable tablet 16 g (has no administration in time range)   glucose chewable tablet 24 g (has no administration in time range)   glucagon (human recombinant) injection 1 mg (has no administration in time range)   dextrose 10% bolus 125 mL 125 mL (has no administration in time range)   dextrose 10% bolus 250 mL 250 mL (has no administration in time range)   sodium chloride 0.9% flush 10 mL (has no administration in time range)   melatonin tablet 6 mg (has no administration in time range)   polyethylene glycol packet 17 g (has no administration in time range)   naloxone 0.4 mg/mL injection 0.02 mg (has no administration in time range)   heparin (porcine) injection 5,000 Units (has no administration in time range)   acetaminophen tablet 650 mg (has no administration in time range)   ondansetron disintegrating tablet 8 mg (has no administration in time range)   0.9%  NaCl infusion (0 mLs Intravenous Stopped 3/12/24 2599)     Medical Decision Making  82 y/o F returns to the ER after syncopal episode       Differential: Dehydration, electrolyte dyscrasia, vasovagal, anemia       Patient found to be anemic. She has been consented for blood transfusion, I've ordered 2 units to be typed and 1 to be transfused. As per her recent discussion with the hospitalist I have made her DNR and discussed her case with the [facility] hospitalist group, who will see and admit the patient for further evaluation and management. Patient and family comfortable with plan.     Problems Addressed:  Anemia due to chronic kidney disease, unspecified CKD stage: acute illness or injury with systemic symptoms  Syncope, unspecified syncope type: acute illness or injury    Amount and/or Complexity of Data Reviewed  External Data Reviewed: ECG and notes.     Details:  Reviewed most recent EKG for comparison    Reviewed most recent discharge summary documenting baseline medications and PMH   Labs: ordered.     Details: CBC without leukocytosis, concerning for acute anemia; CMP with CKD similar to baseline, normal LFTs, hypocalcemia; troponin mildly elevated   Radiology: ordered and independent interpretation performed. Decision-making details documented in ED Course.  ECG/medicine tests: ordered and independent interpretation performed. Decision-making details documented in ED Course.  Discussion of management or test interpretation with external provider(s): D/W [facility] Medicine team regarding patient's presentation, PMH, labs, imaging, interventions in the ER, plan to admit    Risk  Prescription drug management.  Drug therapy requiring intensive monitoring for toxicity.  Decision regarding hospitalization.    Critical Care  Total time providing critical care: 85 minutes                                      Clinical Impression:  Final diagnoses:  [R55] Syncope, unspecified syncope type (Primary)  [N18.9, D63.1] Anemia due to chronic kidney disease, unspecified CKD stage          ED Disposition Condition    Observation                 Arsenio Islas MD  03/12/24 6139

## 2024-03-12 NOTE — SUBJECTIVE & OBJECTIVE
Past Medical History:   Diagnosis Date    Acute encephalopathy 02/29/2024    Acute hypoxemic respiratory failure 12/19/2019    Acute right-sided thoracic back pain 12/09/2019    Allergy     Asthma     Basal cell carcinoma     left forehead    Basal cell carcinoma     left nose    Basal cell carcinoma 05/20/2015    right nose    Basal cell carcinoma 12/22/2015    left lower post neck    Basal cell carcinoma 12/03/2019    left ant scalp     BCC (basal cell carcinoma of skin) 10/20/2022    Right alar groove    Bilateral pleural effusion     Bilateral renal cysts     Breast cancer     CAD (coronary artery disease)     Cardiomyopathy     Cardiomyopathy, ischemic     Cataract     Chest pain 03/04/2024    CHF (congestive heart failure)     CKD (chronic kidney disease) stage 4, GFR 15-29 ml/min     Colon polyp 2011    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, with long-term current use of insulin 02/22/2018    COPD (chronic obstructive pulmonary disease)     COPD exacerbation 04/08/2018    Current mild episode of major depressive disorder without prior episode 01/25/2022    Defibrillator discharge     Dependence on renal dialysis 08/22/2023    Diabetes mellitus     Diabetes mellitus type II     Diabetes with neurologic complications     Edema     ESRD needing dialysis     Goals of care, counseling/discussion 07/08/2022    Goiter     MNG    Hematuria, unspecified     HX: breast cancer     Hyperlipidemia     Hypertension     Hypocalcemia     Hypokalemia     Hyponatremia     Hyponatremia 04/02/2022    Iron deficiency anemia 05/16/2017    Iron deficiency anemia     Iron deficiency anemia     Left kidney mass     Meningioma     Microalbuminuria due to type 2 diabetes mellitus 01/26/2022    Osteoporosis, postmenopausal     Pneumonia 12/08/2019    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    Proteinuria 01/21/2019    Pseudomonas pneumonia     SCC  (squamous cell carcinoma) 08/25/2022    right posterior neck    Skin cancer     s/p excision    Sleep apnea     CPAP    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/2015    mid forehead    Unspecified vitamin D deficiency     UTI (urinary tract infection) 04/02/2022    Ventricular tachycardia     Vitamin B12 deficiency     Vitamin D deficiency disease        Past Surgical History:   Procedure Laterality Date    BASAL CELL CARCINOMA EXCISION      posterior neck and nose    BREAST BIOPSY      BREAST CYST EXCISION Left     BREAST SURGERY      CARDIAC DEFIBRILLATOR PLACEMENT      x 2    CATARACT EXTRACTION W/  INTRAOCULAR LENS IMPLANT Bilateral     CHOLECYSTECTOMY      COLONOSCOPY N/A 11/5/2019    Procedure: COLONOSCOPY;  Surgeon: Boaz Botello MD;  Location: [facility] ENDO [address]);  Service: Endoscopy;  Laterality: N/A;  AICD - [employer] -     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    FRACTURE SURGERY      left elbow and wrist as a child    HYSTERECTOMY      INSERTION, CATHETER, DIALYSIS, PERITONEAL, LAPAROSCOPIC N/A 4/25/2023    Procedure: INSERTION, CATHETER, DIALYSIS, PERITONEAL, LAPAROSCOPIC;  Surgeon: Marcelo Isaac MD;  Location: [facility] OR;  Service: General;  Laterality: N/A;    LAPAROSCOPIC LYSIS OF ADHESIONS N/A 4/25/2023    Procedure: LYSIS, ADHESIONS, LAPAROSCOPIC;  Surgeon: Marcelo Isaac MD;  Location: [facility] OR;  Service: General;  Laterality: N/A;    MASTECTOMY Right     OMENTOPEXY, LAPAROSCOPIC N/A 4/25/2023    Procedure: OMENTOPEXY, LAPAROSCOPIC;  Surgeon: Marcelo Isaac MD;  Location: [facility] OR;  Service: General;  Laterality: N/A;    REPLACEMENT OF IMPLANTABLE CARDIOVERTER-DEFIBRILLATOR (ICD) GENERATOR N/A 12/17/2018    Procedure: REPLACEMENT, ICD GENERATOR;  Surgeon: Jan Mckeon MD;  Location: [facility] EP LAB;  Service: Cardiology;  Laterality: N/A;  DONNA, CRT-D gen sulema, MDT, MAC, SK, 3 Prep    REVISION OF SKIN POCKET FOR CARDIOVERTER-DEFIBRILLATOR  12/17/2018    Procedure: Revision, Skin Pocket,  For Cardioverter-Defibrillator;  Surgeon: Jan Mckeon MD;  Location: [facility] LAB;  Service: Cardiology;;    SQUAMOUS CELL CARCINOMA EXCISION      remved from forehead    TONSILLECTOMY         Review of patient's allergies indicates:   Allergen Reactions    Iodine and iodide containing products Hives and Other (See Comments)     Not specified     Nifedipine      weakness       Current Facility-Administered Medications on File Prior to Encounter   Medication    [DISCONTINUED] acetaminophen tablet 650 mg    [DISCONTINUED] albuterol inhaler 2 puff    [DISCONTINUED] albuterol-ipratropium 2.5 mg-0.5 mg/3 mL nebulizer solution 3 mL    [DISCONTINUED] aspirin chewable tablet 81 mg    [DISCONTINUED] carvediloL tablet 3.125 mg    [DISCONTINUED] cyanocobalamin tablet 100 mcg    [DISCONTINUED] dextrose 10% bolus 125 mL 125 mL    [DISCONTINUED] dextrose 10% bolus 125 mL 125 mL    [DISCONTINUED] dextrose 10% bolus 250 mL 250 mL    [DISCONTINUED] dextrose 10% bolus 250 mL 250 mL    [DISCONTINUED] epoetin jamaica-epbx injection 10,000 Units    [DISCONTINUED] epoetin jamaica-epbx injection 10,000 Units    [DISCONTINUED] ergocalciferol capsule 50,000 Units    [DISCONTINUED] fluticasone propionate 50 mcg/actuation nasal spray 50 mcg    [DISCONTINUED] furosemide injection 60 mg    [DISCONTINUED] glucagon (human recombinant) injection 1 mg    [DISCONTINUED] glucagon (human recombinant) injection 1 mg    [DISCONTINUED] glucose chewable tablet 16 g    [DISCONTINUED] glucose chewable tablet 16 g    [DISCONTINUED] glucose chewable tablet 24 g    [DISCONTINUED] glucose chewable tablet 24 g    [DISCONTINUED] guaiFENesin 12 hr tablet 600 mg    [DISCONTINUED] heparin (porcine) injection 5,000 Units    [DISCONTINUED] hydrALAZINE injection 10 mg    [DISCONTINUED] hydrOXYzine HCL tablet 10 mg    [DISCONTINUED] insulin aspart U-100 pen 0-5 Units    [DISCONTINUED] insulin detemir U-100 (Levemir) pen 8 Units    [DISCONTINUED] labetaloL injection 10 mg     [DISCONTINUED] lactulose 20 gram/30 mL solution Soln 10 g    [DISCONTINUED] LIDOcaine 5 % patch 1 patch    [DISCONTINUED] LIDOcaine 5 % patch 1 patch    [DISCONTINUED] LIDOcaine viscous HCl 2% oral solution 15 mL    [DISCONTINUED] mupirocin 2 % ointment    [DISCONTINUED] naloxone 0.4 mg/mL injection 0.02 mg    [DISCONTINUED] nitroGLYCERIN SL tablet 0.4 mg    [DISCONTINUED] ondansetron injection 4 mg    [DISCONTINUED] piperacillin-tazobactam (ZOSYN) 4.5 g in dextrose 5 % in water (D5W) 100 mL IVPB (MB+)    [DISCONTINUED] sevelamer carbonate tablet 1,600 mg    [DISCONTINUED] sodium bicarbonate tablet 650 mg    [DISCONTINUED] sodium chloride 0.9% flush 10 mL    [DISCONTINUED] vitamin D 1000 units tablet 2,000 Units     Current Outpatient Medications on File Prior to Encounter   Medication Sig    acetaminophen (TYLENOL) 500 MG tablet Take 500 mg by mouth as needed.    albuterol (PROVENTIL/VENTOLIN HFA) 90 mcg/actuation inhaler Inhale 2 puffs into the lungs every 6 (six) hours as needed for Wheezing. Rescue    albuterol-ipratropium (DUO-NEB) 2.5 mg-0.5 mg/3 mL nebulizer solution Take 3 mLs by nebulization every 4 (four) hours as needed for Wheezing.    aspirin 81 MG Chew Take 81 mg by mouth once daily.    calcitRIOL (ROCALTROL) 0.5 MCG Cap Take 1 capsule (0.5 mcg total) by mouth once daily.    carvediloL (COREG) 25 MG tablet Take 0.5 tablets (12.5 mg total) by mouth 2 (two) times daily with meals.    cholecalciferol, vitamin D3, (VITAMIN D3) 50 mcg (2,000 unit) Tab Take 1 tablet by mouth once daily.     cyanocobalamin (VITAMIN B-12) 100 MCG tablet Take 100 mcg by mouth once daily.    epoetin jamaica-epbx (RETACRIT) 10,000 unit/mL imjection Inject 1 mL (10,000 Units total) into the skin every 30 days.    ergocalciferol (ERGOCALCIFEROL) 50,000 unit Cap Take 50,000 Units by mouth every 7 days.    estradioL (ESTRACE) 0.01 % (0.1 mg/gram) vaginal cream PLACE 1 GRAM VAGINALLY EVERY OTHER DAY (Patient taking differently: Place 1 g  "vaginally every other day.)    estradioL (ESTRING) 2 mg (7.5 mcg /24 hour) vaginal ring Place 2 mg vaginally every 3 (three) months.    fluticasone propionate (FLONASE) 50 mcg/actuation nasal spray 1 spray by Each Nostril route daily as needed.    furosemide (LASIX) 80 MG tablet Take 1 tablet (80 mg total) by mouth 2 (two) times a day.    insulin (LANTUS SOLOSTAR U-100 INSULIN) glargine 100 units/mL SubQ pen Inject 8 Units into the skin every evening.    levoFLOXacin (LEVAQUIN) 250 MG tablet Take 1 tablet (250 mg total) by mouth once daily. for 20 days    montelukast (SINGULAIR) 10 mg tablet Take 1 tablet (10 mg total) by mouth once daily.    nitroGLYCERIN (NITROSTAT) 0.4 MG SL tablet Place 0.4 mg under the tongue every 5 (five) minutes as needed for Chest pain.     omega-3 fatty acids 500 mg Cap Take 1 capsule by mouth Daily.    pravastatin (PRAVACHOL) 40 MG tablet Take 1 tablet (40 mg total) by mouth once daily.    sevelamer carbonate (RENVELA) 800 mg Tab Take 2 tablets (1,600 mg total) by mouth 3 (three) times daily.    sodium bicarbonate 650 MG tablet Take 1 tablet (650 mg total) by mouth 4 (four) times daily.    valsartan (DIOVAN) 320 MG tablet Take 160 mg by mouth once daily. 1/2 tablet    blood sugar diagnostic (ACCU-CHEK HECTOR) Strp Uses Accu-Check Hector meter to test BG 4x/day    lancets (ACCU-CHEK SOFTCLIX LANCETS) Misc Uses Accu-Chek Hector meter to test BG 2x/day    pen needle, diabetic (BD ULTRA-FINE MINI PEN NEEDLE) 31 gauge x 3/16" Ndle Use with insulin twice a day.    [DISCONTINUED] valsartan (DIOVAN) 160 MG tablet Take 0.5 tablets (80 mg total) by mouth once daily.    [DISCONTINUED] valsartan (DIOVAN) 80 MG tablet Take 80 mg by mouth once daily.     Family History       Problem Relation (Age of Onset)    Asthma Mother    Cancer Brother, Daughter, Son    Cardiomyopathy Father    Cerebral aneurysm Brother    Diabetes Father, Sister, Brother, Brother, Brother, Brother, Daughter, Son, Son    Heart attack " Sister, Brother    Heart disease Sister, Brother, Brother, Brother    Hypertension Mother, Brother, Brother    Melanoma Daughter    No Known Problems Maternal Grandmother, Maternal Grandfather, Paternal Grandmother, Paternal Grandfather    Obesity Daughter    Stroke Mother          Tobacco Use    Smoking status: Never     Passive exposure: Never    Smokeless tobacco: Never   Substance and Sexual Activity    Alcohol use: No     Alcohol/week: 0.0 standard drinks of alcohol    Drug use: No    Sexual activity: Yes     Partners: Male     Review of Systems   Constitutional:  Negative for chills, diaphoresis, fatigue and fever.   HENT:  Negative for congestion, ear pain and sore throat.    Eyes:  Negative for photophobia and pain.   Respiratory:  Negative for cough, chest tightness and shortness of breath.    Cardiovascular:  Negative for chest pain and leg swelling.   Gastrointestinal:  Negative for abdominal pain, constipation and diarrhea.   Endocrine: Negative for polydipsia, polyphagia and polyuria.   Genitourinary:  Negative for difficulty urinating, dysuria, flank pain, frequency, hematuria, vaginal bleeding and vaginal discharge.   Musculoskeletal:  Negative for arthralgias, joint swelling and myalgias.   Skin:  Negative for color change and rash.   Allergic/Immunologic: Negative for environmental allergies and food allergies.   Neurological:  Negative for dizziness, tremors, light-headedness, numbness and headaches.   Hematological:  Negative for adenopathy.   Psychiatric/Behavioral:  Negative for agitation, behavioral problems, hallucinations and suicidal ideas.    All other systems reviewed and are negative.    Objective:     Vital Signs (Most Recent):  Temp: 98 °F (36.7 °C) (03/12/24 1614)  Pulse: (!) 54 (03/12/24 1614)  Resp: 20 (03/12/24 1614)  BP: (!) 114/55 (03/12/24 1614)  SpO2: 100 % (03/12/24 1614) Vital Signs (24h Range):  Temp:  [98 °F (36.7 °C)-98.7 °F (37.1 °C)] 98 °F (36.7 °C)  Pulse:  [50-54]  54  Resp:  [18-20] 20  SpO2:  [100 %] 100 %  BP: ()/(50-69) 114/55     Weight: 54.4 kg (120 lb)  Body mass index is 21.26 kg/m².     Physical Exam  Vitals and nursing note reviewed.   Constitutional:       Appearance: She is well-developed.   HENT:      Head: Normocephalic and atraumatic.      Nose: Nose normal.   Eyes:      Extraocular Movements: EOM normal.      Conjunctiva/sclera: Conjunctivae normal.   Cardiovascular:      Rate and Rhythm: Normal rate and regular rhythm.      Heart sounds: Normal heart sounds.   Pulmonary:      Effort: Pulmonary effort is normal.      Breath sounds: Normal breath sounds. No wheezing.   Abdominal:      General: Bowel sounds are normal.      Palpations: Abdomen is soft. There is no mass.      Tenderness: There is no abdominal tenderness. There is no guarding or rebound.      Comments: Some brusing noted on abdomen with PD catheter in placed   Musculoskeletal:         General: No tenderness. Normal range of motion.      Cervical back: Normal range of motion and neck supple.   Skin:     General: Skin is warm.      Findings: No rash.   Neurological:      Mental Status: She is alert and oriented to person, place, and time.      Cranial Nerves: No cranial nerve deficit.   Psychiatric:         Behavior: Behavior normal.         Thought Content: Thought content normal.              CRANIAL NERVES     CN III, IV, VI   Extraocular motions are normal.        Significant Labs: All pertinent labs within the past 24 hours have been reviewed.  BMP:   Recent Labs   Lab 03/12/24  1329   *      K 3.8   CL 93*   CO2 28   BUN 49*   CREATININE 4.8*   CALCIUM 6.8*   MG 1.7     CBC:   Recent Labs   Lab 03/12/24  1329   WBC 12.12   HGB 6.7*   HCT 21.6*          Significant Imaging: I have reviewed all pertinent imaging results/findings within the past 24 hours.  I have reviewed and interpreted all pertinent imaging results/findings within the past 24 hours.

## 2024-03-12 NOTE — H&P
[facility] Emergency Dept  [facility] Medicine  History & Physical    Patient Name: Myesha Quinones  MRN: [id number]  Patient Class: OP- Observation  Admission Date: 3/12/2024  Attending Physician: Maureen Gray MD   Primary Care Provider: Dayton Michael MD         Patient information was obtained from patient and ER records.     Subjective:     Principal Problem:Syncope    Chief Complaint:   Chief Complaint   Patient presents with    near syncope     Brought to ED from home for near syncope when attempting to stand to go to the bathroom. Last PD was 2 days ago.         HPI: 83-year-old female with PMH of ESRD on PD, type 2 diabetes mellitus, hypertension, obstructive airway disease, CAD, breast cancer, hyperlipidemia brought to the ER for syncopal episode by family. Patient had gotten up to go to the bathroom with the help of her family. Got up and noticed to have LOC and tonic clonic jerking reported.  Pt was out for possibly 5min. Noted to have similar episodes on the last admission. Was discharged yesterday for pneumonia with pleural effusion. . EMS arrived shortly after and noted the hypotension with sbps in the 80s.Pt was supposed to take valsartan 80mg but didn't get the prescription. Daughter cut the medication to 160 from 380mg. . Denies any CP or sob. No other symptoms reported at this time     presented with fatigue, lethargy, periodic involuntary jerks found to have Pseudomonas pneumonia with uncomplicated parapneumonic effusion. Underwent left thoracentesis, pleural fluid cultures negative so far. Respiratory cultures grew pansensitive Pseudomonas. Patient was seen by pulmonology and recommended 4-6 weeks of antibiotics for Pseudomonas pneumonia. Patient's symptoms improved but required supplemental oxygen at discharge. Discharged on levofloxacin 250 mg daily, end date 03/31 for 4 weeks therapy. Frequency of PD was increased from twice a week to daily as uremia thought to be contributing to  periodic involuntary jerks. With daily PD, involuntary movements resolved. Antihypertensives initially held given borderline low blood pressures, resumed at lower doses at the time of discharge.      CXR in the ER showed edema with left pleural effusion and hb of 6.7. Hypotension with sbp in the 90 in the ER    Past Medical History:   Diagnosis Date    Acute encephalopathy 02/29/2024    Acute hypoxemic respiratory failure 12/19/2019    Acute right-sided thoracic back pain 12/09/2019    Allergy     Asthma     Basal cell carcinoma     left forehead    Basal cell carcinoma     left nose    Basal cell carcinoma 05/20/2015    right nose    Basal cell carcinoma 12/22/2015    left lower post neck    Basal cell carcinoma 12/03/2019    left ant scalp     BCC (basal cell carcinoma of skin) 10/20/2022    Right alar groove    Bilateral pleural effusion     Bilateral renal cysts     Breast cancer     CAD (coronary artery disease)     Cardiomyopathy     Cardiomyopathy, ischemic     Cataract     Chest pain 03/04/2024    CHF (congestive heart failure)     CKD (chronic kidney disease) stage 4, GFR 15-29 ml/min     Colon polyp 2011    Controlled type 2 diabetes mellitus with both eyes affected by mild nonproliferative retinopathy and macular edema, with long-term current use of insulin 02/22/2018    COPD (chronic obstructive pulmonary disease)     COPD exacerbation 04/08/2018    Current mild episode of major depressive disorder without prior episode 01/25/2022    Defibrillator discharge     Dependence on renal dialysis 08/22/2023    Diabetes mellitus     Diabetes mellitus type II     Diabetes with neurologic complications     Edema     ESRD needing dialysis     Goals of care, counseling/discussion 07/08/2022    Goiter     MNG    Hematuria, unspecified     HX: breast cancer     Hyperlipidemia     Hypertension     Hypocalcemia     Hypokalemia     Hyponatremia     Hyponatremia 04/02/2022    Iron deficiency anemia 05/16/2017    Iron  deficiency anemia     Iron deficiency anemia     Left kidney mass     Meningioma     Microalbuminuria due to type 2 diabetes mellitus 01/26/2022    Osteoporosis, postmenopausal     Pneumonia 12/08/2019    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    Proteinuria 01/21/2019    Pseudomonas pneumonia     SCC (squamous cell carcinoma) 08/25/2022    right posterior neck    Skin cancer     s/p excision    Sleep apnea     CPAP    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/2015    mid forehead    Unspecified vitamin D deficiency     UTI (urinary tract infection) 04/02/2022    Ventricular tachycardia     Vitamin B12 deficiency     Vitamin D deficiency disease        Past Surgical History:   Procedure Laterality Date    BASAL CELL CARCINOMA EXCISION      posterior neck and nose    BREAST BIOPSY      BREAST CYST EXCISION Left     BREAST SURGERY      CARDIAC DEFIBRILLATOR PLACEMENT      x 2    CATARACT EXTRACTION W/  INTRAOCULAR LENS IMPLANT Bilateral     CHOLECYSTECTOMY      COLONOSCOPY N/A 11/5/2019    Procedure: COLONOSCOPY;  Surgeon: Boaz Botello MD;  Location: [address]);  Service: Endoscopy;  Laterality: N/A;  [facility] [employer] -     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    FRACTURE SURGERY      left elbow and wrist as a child    HYSTERECTOMY      INSERTION, CATHETER, DIALYSIS, PERITONEAL, LAPAROSCOPIC N/A 4/25/2023    Procedure: INSERTION, CATHETER, DIALYSIS, PERITONEAL, LAPAROSCOPIC;  Surgeon: Marcelo Isaac MD;  Location: [facility];  Service: General;  Laterality: N/A;    LAPAROSCOPIC LYSIS OF ADHESIONS N/A 4/25/2023    Procedure: LYSIS, ADHESIONS, LAPAROSCOPIC;  Surgeon: Marcelo Isaac MD;  Location: [facility] OR;  Service: General;  Laterality: N/A;    MASTECTOMY Right     OMENTOPEXY, LAPAROSCOPIC N/A 4/25/2023    Procedure: OMENTOPEXY, LAPAROSCOPIC;  Surgeon: Marcelo Isaac MD;  Location: [facility] OR;  Service: General;  Laterality: N/A;    REPLACEMENT OF IMPLANTABLE CARDIOVERTER-DEFIBRILLATOR (ICD)  GENERATOR N/A 12/17/2018    Procedure: REPLACEMENT, ICD GENERATOR;  Surgeon: Jan Mckeon MD;  Location: [facility];  Service: Cardiology;  Laterality: N/A;  DONNA, CRT-D gen change, MDT, MAC, SK, 3 Prep    REVISION OF SKIN POCKET FOR CARDIOVERTER-DEFIBRILLATOR  12/17/2018    Procedure: Revision, Skin Pocket, For Cardioverter-Defibrillator;  Surgeon: Jan Mckeon MD;  Location: [facility];  Service: Cardiology;;    SQUAMOUS CELL CARCINOMA EXCISION      remved from forehead    TONSILLECTOMY         Review of patient's allergies indicates:   Allergen Reactions    Iodine and iodide containing products Hives and Other (See Comments)     Not specified     Nifedipine      weakness       Current Facility-Administered Medications on File Prior to Encounter   Medication    [DISCONTINUED] acetaminophen tablet 650 mg    [DISCONTINUED] albuterol inhaler 2 puff    [DISCONTINUED] albuterol-ipratropium 2.5 mg-0.5 mg/3 mL nebulizer solution 3 mL    [DISCONTINUED] aspirin chewable tablet 81 mg    [DISCONTINUED] carvediloL tablet 3.125 mg    [DISCONTINUED] cyanocobalamin tablet 100 mcg    [DISCONTINUED] dextrose 10% bolus 125 mL 125 mL    [DISCONTINUED] dextrose 10% bolus 125 mL 125 mL    [DISCONTINUED] dextrose 10% bolus 250 mL 250 mL    [DISCONTINUED] dextrose 10% bolus 250 mL 250 mL    [DISCONTINUED] epoetin jamaica-epbx injection 10,000 Units    [DISCONTINUED] epoetin jamaica-epbx injection 10,000 Units    [DISCONTINUED] ergocalciferol capsule 50,000 Units    [DISCONTINUED] fluticasone propionate 50 mcg/actuation nasal spray 50 mcg    [DISCONTINUED] furosemide injection 60 mg    [DISCONTINUED] glucagon (human recombinant) injection 1 mg    [DISCONTINUED] glucagon (human recombinant) injection 1 mg    [DISCONTINUED] glucose chewable tablet 16 g    [DISCONTINUED] glucose chewable tablet 16 g    [DISCONTINUED] glucose chewable tablet 24 g    [DISCONTINUED] glucose chewable tablet 24 g    [DISCONTINUED] guaiFENesin 12 hr tablet 600 mg     [DISCONTINUED] heparin (porcine) injection 5,000 Units    [DISCONTINUED] hydrALAZINE injection 10 mg    [DISCONTINUED] hydrOXYzine HCL tablet 10 mg    [DISCONTINUED] insulin aspart U-100 pen 0-5 Units    [DISCONTINUED] insulin detemir U-100 (Levemir) pen 8 Units    [DISCONTINUED] labetaloL injection 10 mg    [DISCONTINUED] lactulose 20 gram/30 mL solution Soln 10 g    [DISCONTINUED] LIDOcaine 5 % patch 1 patch    [DISCONTINUED] LIDOcaine 5 % patch 1 patch    [DISCONTINUED] LIDOcaine viscous HCl 2% oral solution 15 mL    [DISCONTINUED] mupirocin 2 % ointment    [DISCONTINUED] naloxone 0.4 mg/mL injection 0.02 mg    [DISCONTINUED] nitroGLYCERIN SL tablet 0.4 mg    [DISCONTINUED] ondansetron injection 4 mg    [DISCONTINUED] piperacillin-tazobactam (ZOSYN) 4.5 g in dextrose 5 % in water (D5W) 100 mL IVPB (MB+)    [DISCONTINUED] sevelamer carbonate tablet 1,600 mg    [DISCONTINUED] sodium bicarbonate tablet 650 mg    [DISCONTINUED] sodium chloride 0.9% flush 10 mL    [DISCONTINUED] vitamin D 1000 units tablet 2,000 Units     Current Outpatient Medications on File Prior to Encounter   Medication Sig    acetaminophen (TYLENOL) 500 MG tablet Take 500 mg by mouth as needed.    albuterol (PROVENTIL/VENTOLIN HFA) 90 mcg/actuation inhaler Inhale 2 puffs into the lungs every 6 (six) hours as needed for Wheezing. Rescue    albuterol-ipratropium (DUO-NEB) 2.5 mg-0.5 mg/3 mL nebulizer solution Take 3 mLs by nebulization every 4 (four) hours as needed for Wheezing.    aspirin 81 MG Chew Take 81 mg by mouth once daily.    calcitRIOL (ROCALTROL) 0.5 MCG Cap Take 1 capsule (0.5 mcg total) by mouth once daily.    carvediloL (COREG) 25 MG tablet Take 0.5 tablets (12.5 mg total) by mouth 2 (two) times daily with meals.    cholecalciferol, vitamin D3, (VITAMIN D3) 50 mcg (2,000 unit) Tab Take 1 tablet by mouth once daily.     cyanocobalamin (VITAMIN B-12) 100 MCG tablet Take 100 mcg by mouth once daily.    epoetin jamaica-epbx (RETACRIT)  "10,000 unit/mL imjection Inject 1 mL (10,000 Units total) into the skin every 30 days.    ergocalciferol (ERGOCALCIFEROL) 50,000 unit Cap Take 50,000 Units by mouth every 7 days.    estradioL (ESTRACE) 0.01 % (0.1 mg/gram) vaginal cream PLACE 1 GRAM VAGINALLY EVERY OTHER DAY (Patient taking differently: Place 1 g vaginally every other day.)    estradioL (ESTRING) 2 mg (7.5 mcg /24 hour) vaginal ring Place 2 mg vaginally every 3 (three) months.    fluticasone propionate (FLONASE) 50 mcg/actuation nasal spray 1 spray by Each Nostril route daily as needed.    furosemide (LASIX) 80 MG tablet Take 1 tablet (80 mg total) by mouth 2 (two) times a day.    insulin (LANTUS SOLOSTAR U-100 INSULIN) glargine 100 units/mL SubQ pen Inject 8 Units into the skin every evening.    levoFLOXacin (LEVAQUIN) 250 MG tablet Take 1 tablet (250 mg total) by mouth once daily. for 20 days    montelukast (SINGULAIR) 10 mg tablet Take 1 tablet (10 mg total) by mouth once daily.    nitroGLYCERIN (NITROSTAT) 0.4 MG SL tablet Place 0.4 mg under the tongue every 5 (five) minutes as needed for Chest pain.     omega-3 fatty acids 500 mg Cap Take 1 capsule by mouth Daily.    pravastatin (PRAVACHOL) 40 MG tablet Take 1 tablet (40 mg total) by mouth once daily.    sevelamer carbonate (RENVELA) 800 mg Tab Take 2 tablets (1,600 mg total) by mouth 3 (three) times daily.    sodium bicarbonate 650 MG tablet Take 1 tablet (650 mg total) by mouth 4 (four) times daily.    valsartan (DIOVAN) 320 MG tablet Take 160 mg by mouth once daily. 1/2 tablet    blood sugar diagnostic (ACCU-CHEK HECTOR) Strp Uses Accu-Check Hector meter to test BG 4x/day    lancets (ACCU-CHEK SOFTCLIX LANCETS) Misc Uses Accu-Chek Hector meter to test BG 2x/day    pen needle, diabetic (BD ULTRA-FINE MINI PEN NEEDLE) 31 gauge x 3/16" Ndle Use with insulin twice a day.    [DISCONTINUED] valsartan (DIOVAN) 160 MG tablet Take 0.5 tablets (80 mg total) by mouth once daily.    [DISCONTINUED] " valsartan (DIOVAN) 80 MG tablet Take 80 mg by mouth once daily.     Family History       Problem Relation (Age of Onset)    Asthma Mother    Cancer Brother, Daughter, Son    Cardiomyopathy Father    Cerebral aneurysm Brother    Diabetes Father, Sister, Brother, Brother, Brother, Brother, Daughter, Son, Son    Heart attack Sister, Brother    Heart disease Sister, Brother, Brother, Brother    Hypertension Mother, Brother, Brother    Melanoma Daughter    No Known Problems Maternal Grandmother, Maternal Grandfather, Paternal Grandmother, Paternal Grandfather    Obesity Daughter    Stroke Mother          Tobacco Use    Smoking status: Never     Passive exposure: Never    Smokeless tobacco: Never   Substance and Sexual Activity    Alcohol use: No     Alcohol/week: 0.0 standard drinks of alcohol    Drug use: No    Sexual activity: Yes     Partners: Male     Review of Systems   Constitutional:  Negative for chills, diaphoresis, fatigue and fever.   HENT:  Negative for congestion, ear pain and sore throat.    Eyes:  Negative for photophobia and pain.   Respiratory:  Negative for cough, chest tightness and shortness of breath.    Cardiovascular:  Negative for chest pain and leg swelling.   Gastrointestinal:  Negative for abdominal pain, constipation and diarrhea.   Endocrine: Negative for polydipsia, polyphagia and polyuria.   Genitourinary:  Negative for difficulty urinating, dysuria, flank pain, frequency, hematuria, vaginal bleeding and vaginal discharge.   Musculoskeletal:  Negative for arthralgias, joint swelling and myalgias.   Skin:  Negative for color change and rash.   Allergic/Immunologic: Negative for environmental allergies and food allergies.   Neurological:  Negative for dizziness, tremors, light-headedness, numbness and headaches.   Hematological:  Negative for adenopathy.   Psychiatric/Behavioral:  Negative for agitation, behavioral problems, hallucinations and suicidal ideas.    All other systems reviewed and  are negative.    Objective:     Vital Signs (Most Recent):  Temp: 98 °F (36.7 °C) (03/12/24 1614)  Pulse: (!) 54 (03/12/24 1614)  Resp: 20 (03/12/24 1614)  BP: (!) 114/55 (03/12/24 1614)  SpO2: 100 % (03/12/24 1614) Vital Signs (24h Range):  Temp:  [98 °F (36.7 °C)-98.7 °F (37.1 °C)] 98 °F (36.7 °C)  Pulse:  [50-54] 54  Resp:  [18-20] 20  SpO2:  [100 %] 100 %  BP: ()/(50-69) 114/55     Weight: 54.4 kg (120 lb)  Body mass index is 21.26 kg/m².     Physical Exam  Vitals and nursing note reviewed.   Constitutional:       Appearance: She is well-developed.   HENT:      Head: Normocephalic and atraumatic.      Nose: Nose normal.   Eyes:      Extraocular Movements: EOM normal.      Conjunctiva/sclera: Conjunctivae normal.   Cardiovascular:      Rate and Rhythm: Normal rate and regular rhythm.      Heart sounds: Normal heart sounds.   Pulmonary:      Effort: Pulmonary effort is normal.      Breath sounds: Normal breath sounds. No wheezing.   Abdominal:      General: Bowel sounds are normal.      Palpations: Abdomen is soft. There is no mass.      Tenderness: There is no abdominal tenderness. There is no guarding or rebound.      Comments: Some brusing noted on abdomen with PD catheter in placed   Musculoskeletal:         General: No tenderness. Normal range of motion.      Cervical back: Normal range of motion and neck supple.   Skin:     General: Skin is warm.      Findings: No rash.   Neurological:      Mental Status: She is alert and oriented to person, place, and time.      Cranial Nerves: No cranial nerve deficit.   Psychiatric:         Behavior: Behavior normal.         Thought Content: Thought content normal.              CRANIAL NERVES     CN III, IV, VI   Extraocular motions are normal.        Significant Labs: All pertinent labs within the past 24 hours have been reviewed.  BMP:   Recent Labs   Lab 03/12/24  1329   *      K 3.8   CL 93*   CO2 28   BUN 49*   CREATININE 4.8*   CALCIUM 6.8*   MG  1.7     CBC:   Recent Labs   Lab 03/12/24  1329   WBC 12.12   HGB 6.7*   HCT 21.6*          Significant Imaging: I have reviewed all pertinent imaging results/findings within the past 24 hours.  I have reviewed and interpreted all pertinent imaging results/findings within the past 24 hours.  Assessment/Plan:     * Syncope  Echo done recently march 1st   Carotid u/s ordered  Antihypertensive held  Suspect syncope from hypotension combined with acute on chronic anemia      ESRD on peritoneal dialysis  Nephro consult  Dialysis per nephro      Community acquired pneumonia  Cont levaquin from previous admission      Acute on chronic combined systolic and diastolic heart failure  Dialysis per nephro      Iron deficiency anemia  Patient's anemia is currently uncontrolled. Has received 1 units of PRBCs on 3/12/24 . Etiology likely d/t chronic disease due to ESRD  Current CBC reviewed-   Lab Results   Component Value Date    HGB 6.7 (L) 03/12/2024    HCT 21.6 (L) 03/12/2024     Monitor serial CBC and transfuse if patient becomes hemodynamically unstable, symptomatic or H/H drops below 7/21.    Diabetic polyneuropathy associated with type 2 diabetes mellitus  Resume home meds  ISS and monitor      Type 2 diabetes mellitus with stage 4 chronic kidney disease and hypertension  Creatine stable for now. BMP reviewed- noted Estimated Creatinine Clearance: 7.2 mL/min (A) (based on SCr of 4.8 mg/dL (H)). according to latest data. 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  Monitor UOP and serial BMP and adjust therapy as needed. Renally dose meds. Avoid nephrotoxic medications and procedures.      VTE Risk Mitigation (From admission, onward)           Ordered     IP VTE HIGH RISK PATIENT  Once         03/12/24 1507     Place sequential compression device  Until discontinued         03/12/24 1507                       On 03/12/2024, patient should be placed in hospital observation services under my care.        Pharmacist  Renal Dose Adjustment Note    Myesha Quinones is a 83 y.o. female being treated with the medication levofloxacin    Patient Data:    Vital Signs (Most Recent):  Temp: 98.7 °F (37.1 °C) (03/12/24 1306)  Pulse: (!) 50 (03/12/24 1306)  Resp: 18 (03/12/24 1306)  BP: 90/64 (03/12/24 1306)  SpO2: 100 % (03/12/24 1306) Vital Signs (72h Range):  Temp:  [97.5 °F (36.4 °C)-99.4 °F (37.4 °C)]   Pulse:  []   Resp:  [16-20]   BP: ()/(60-80)   SpO2:  [92 %-100 %]      Recent Labs   Lab 03/08/24  0247 03/09/24  0247 03/12/24  1329   CREATININE 5.8* 5.2* 4.8*     Serum creatinine: 4.8 mg/dL (H) 03/12/24 1329  Estimated creatinine clearance: 7.2 mL/min (A)    Medication:levofloxacin dose: 250 mg frequency daily will be changed to medication:levofloxacin dose:500 mg frequency:q48h    Pharmacist's Name: Nadeen Oneal  Pharmacist's Extension: [phone]      Maureen Gray MD  Department of Hospital Medicine  [facility] - Emergency Dept

## 2024-03-12 NOTE — PHARMACY MED REC
"  [facility]           Pharmacy  Admission Medication History     The home medication history was taken by Julieta Rodriguez.      Medication history obtained from Medications listed below were obtained from: Patient/family    Based on information gathered for medication list, you may go to "Admission" then "Reconcile Home Medications" tabs to review and/or act upon those items.     The home medication list has been updated by the Pharmacy department.   Please read ALL comments highlighted in yellow.   Please address this information as you see fit.    Feel free to contact us if you have any questions or require assistance.        Current Facility-Administered Medications on File Prior to Encounter   Medication Dose Route Frequency Provider Last Rate Last Admin    [DISCONTINUED] acetaminophen tablet 650 mg  650 mg Oral Q4H PRN Sid Cárdenas MD   650 mg at 03/10/24 1122    [DISCONTINUED] albuterol inhaler 2 puff  2 puff Inhalation Q6H PRN Sid Cárdenas MD        [DISCONTINUED] albuterol-ipratropium 2.5 mg-0.5 mg/3 mL nebulizer solution 3 mL  3 mL Nebulization Q4H PRN Sid Cárdenas MD        [DISCONTINUED] aspirin chewable tablet 81 mg  81 mg Oral Daily Sid Cárdenas MD   81 mg at 03/11/24 0923    [DISCONTINUED] carvediloL tablet 3.125 mg  3.125 mg Oral BID Charlotte Villalobos MD   3.125 mg at 03/11/24 0923    [DISCONTINUED] cyanocobalamin tablet 100 mcg  100 mcg Oral Daily Sid Cárdenas MD   100 mcg at 03/11/24 0923    [DISCONTINUED] dextrose 10% bolus 125 mL 125 mL  12.5 g Intravenous PRN Sid Cárdenas MD        [DISCONTINUED] dextrose 10% bolus 125 mL 125 mL  12.5 g Intravenous PRN Betsy Enrique NP        [DISCONTINUED] dextrose 10% bolus 250 mL 250 mL  25 g Intravenous PRN Sid Cárdenas MD   Stopped at 03/04/24 0535    [DISCONTINUED] dextrose 10% bolus 250 mL 250 mL  25 g Intravenous PRN Betsy Enrique NP        [DISCONTINUED] epoetin jamaica-epbx injection 10,000 Units  10,000 " Units Subcutaneous Once Hayley Morocho MD        [DISCONTINUED] epoetin jamaica-epbx injection 10,000 Units  10,000 Units Subcutaneous Every Mon, Wed, Fri Hayley Morocho MD        [DISCONTINUED] ergocalciferol capsule 50,000 Units  50,000 Units Oral Q7 Days Hayley Morocho MD   50,000 Units at 03/05/24 1316    [DISCONTINUED] fluticasone propionate 50 mcg/actuation nasal spray 50 mcg  1 spray Each Nostril Daily PRN Sid Cárdenas MD        [DISCONTINUED] furosemide injection 60 mg  60 mg Intravenous Q12H Bianca Miramontes MD   60 mg at 03/11/24 0923    [DISCONTINUED] glucagon (human recombinant) injection 1 mg  1 mg Intramuscular PRN Sid Cárdenas MD        [DISCONTINUED] glucagon (human recombinant) injection 1 mg  1 mg Intramuscular PRN Betsy Enrique NP        [DISCONTINUED] glucose chewable tablet 16 g  16 g Oral PRN Sid Cárdenas MD        [DISCONTINUED] glucose chewable tablet 16 g  16 g Oral PRN Betsy Enrique NP   16 g at 03/08/24 0614    [DISCONTINUED] glucose chewable tablet 24 g  24 g Oral PRN Sid Cárdenas MD        [DISCONTINUED] glucose chewable tablet 24 g  24 g Oral PRN Betsy Enrique NP        [DISCONTINUED] guaiFENesin 12 hr tablet 600 mg  600 mg Oral BID Charlotte Villalobos MD   600 mg at 03/11/24 0924    [DISCONTINUED] heparin (porcine) injection 5,000 Units  5,000 Units Subcutaneous Q8H Sid Cárdenas MD   5,000 Units at 03/11/24 0525    [DISCONTINUED] hydrALAZINE injection 10 mg  10 mg Intravenous Q6H PRN Alla Zelaya MD   10 mg at 03/10/24 2205    [DISCONTINUED] hydrOXYzine HCL tablet 10 mg  10 mg Oral TID PRN Charlotte Villalobos MD        [DISCONTINUED] insulin aspart U-100 pen 0-5 Units  0-5 Units Subcutaneous QID (AC + HS) PRN Betsy Enrique NP   2 Units at 03/11/24 1207    [DISCONTINUED] insulin detemir U-100 (Levemir) pen 8 Units  8 Units Subcutaneous QHS Charlotte Villalobos MD   8 Units at 03/10/24 2214    [DISCONTINUED] labetaloL injection 10 mg  10 mg Intravenous Q6H PRN  Alla Zelaya MD        [DISCONTINUED] lactulose 20 gram/30 mL solution Soln 10 g  10 g Oral Daily Charlotte Villalobos MD   10 g at 03/11/24 0923    [DISCONTINUED] LIDOcaine 5 % patch 1 patch  1 patch Transdermal Q24H Charlotte Villalobos MD   1 patch at 03/09/24 1041    [DISCONTINUED] LIDOcaine 5 % patch 1 patch  1 patch Transdermal Q24H Charlotte Villalobos MD   1 patch at 03/06/24 2305    [DISCONTINUED] LIDOcaine viscous HCl 2% oral solution 15 mL  15 mL Oral Once Danish Reyes MD        [DISCONTINUED] mupirocin 2 % ointment   Nasal BID Charlotte Villalobos MD   Given at 03/11/24 0923    [DISCONTINUED] naloxone 0.4 mg/mL injection 0.02 mg  0.02 mg Intravenous PRN Sid Cárdenas MD        [DISCONTINUED] nitroGLYCERIN SL tablet 0.4 mg  0.4 mg Sublingual Q5 Min PRN Sid Cárdenas MD   0.4 mg at 03/04/24 0647    [DISCONTINUED] ondansetron injection 4 mg  4 mg Intravenous Q6H PRN Charlotte Villalobos MD   4 mg at 03/05/24 1621    [DISCONTINUED] piperacillin-tazobactam (ZOSYN) 4.5 g in dextrose 5 % in water (D5W) 100 mL IVPB (MB+)  4.5 g Intravenous Q12H Danish Reyes MD   Stopped at 03/11/24 1335    [DISCONTINUED] sevelamer carbonate tablet 1,600 mg  1,600 mg Oral QID (WM & HS) Hayley Morocho MD   1,600 mg at 03/11/24 1207    [DISCONTINUED] sodium bicarbonate tablet 650 mg  650 mg Oral QID Hayley Morocho MD   650 mg at 03/11/24 1207    [DISCONTINUED] sodium chloride 0.9% flush 10 mL  10 mL Intravenous Q12H PRN Sid Cárdenas MD        [DISCONTINUED] vitamin D 1000 units tablet 2,000 Units  2,000 Units Oral Daily Sid Cárdenas MD   2,000 Units at 03/11/24 0924     Current Outpatient Medications on File Prior to Encounter   Medication Sig Dispense Refill    acetaminophen (TYLENOL) 500 MG tablet Take 500 mg by mouth as needed.      albuterol (PROVENTIL/VENTOLIN HFA) 90 mcg/actuation inhaler Inhale 2 puffs into the lungs every 6 (six) hours as needed for Wheezing. Rescue 18 g 12     albuterol-ipratropium (DUO-NEB) 2.5 mg-0.5 mg/3 mL nebulizer solution Take 3 mLs by nebulization every 4 (four) hours as needed for Wheezing. 270 mL 12    aspirin 81 MG Chew Take 81 mg by mouth once daily.      calcitRIOL (ROCALTROL) 0.5 MCG Cap Take 1 capsule (0.5 mcg total) by mouth once daily. 30 capsule 11    carvediloL (COREG) 25 MG tablet Take 0.5 tablets (12.5 mg total) by mouth 2 (two) times daily with meals.      cholecalciferol, vitamin D3, (VITAMIN D3) 50 mcg (2,000 unit) Tab Take 1 tablet by mouth once daily.       cyanocobalamin (VITAMIN B-12) 100 MCG tablet Take 100 mcg by mouth once daily.      epoetin jamaica-epbx (RETACRIT) 10,000 unit/mL imjection Inject 1 mL (10,000 Units total) into the skin every 30 days. 1 mL 11    ergocalciferol (ERGOCALCIFEROL) 50,000 unit Cap Take 50,000 Units by mouth every 7 days.      estradioL (ESTRACE) 0.01 % (0.1 mg/gram) vaginal cream PLACE 1 GRAM VAGINALLY EVERY OTHER DAY (Patient taking differently: Place 1 g vaginally every other day.) 42.5 g 3    estradioL (ESTRING) 2 mg (7.5 mcg /24 hour) vaginal ring Place 2 mg vaginally every 3 (three) months. 1 each 3    fluticasone propionate (FLONASE) 50 mcg/actuation nasal spray 1 spray by Each Nostril route daily as needed.      furosemide (LASIX) 80 MG tablet Take 1 tablet (80 mg total) by mouth 2 (two) times a day. 60 tablet 3    insulin (LANTUS SOLOSTAR U-100 INSULIN) glargine 100 units/mL SubQ pen Inject 8 Units into the skin every evening.  0    levoFLOXacin (LEVAQUIN) 250 MG tablet Take 1 tablet (250 mg total) by mouth once daily. for 20 days 20 tablet 0    montelukast (SINGULAIR) 10 mg tablet Take 1 tablet (10 mg total) by mouth once daily. 90 tablet 3    nitroGLYCERIN (NITROSTAT) 0.4 MG SL tablet Place 0.4 mg under the tongue every 5 (five) minutes as needed for Chest pain.       omega-3 fatty acids 500 mg Cap Take 1 capsule by mouth Daily.      pravastatin (PRAVACHOL) 40 MG tablet Take 1 tablet (40 mg total) by mouth  "once daily. 90 tablet 3    sevelamer carbonate (RENVELA) 800 mg Tab Take 2 tablets (1,600 mg total) by mouth 3 (three) times daily. 180 tablet 3    sodium bicarbonate 650 MG tablet Take 1 tablet (650 mg total) by mouth 4 (four) times daily. 120 tablet 3    valsartan (DIOVAN) 320 MG tablet Take 160 mg by mouth once daily. 1/2 tablet      blood sugar diagnostic (ACCU-CHEK HECTOR) Strp Uses Accu-Check Hector meter to test BG 4x/day 400 strip 6    lancets (ACCU-CHEK SOFTCLIX LANCETS) Misc Uses Accu-Chek Hector meter to test BG 2x/day 400 each 6    pen needle, diabetic (BD ULTRA-FINE MINI PEN NEEDLE) 31 gauge x 3/16" Ndle Use with insulin twice a day. 400 each 3       Please address this information as you see fit.  Feel free to contact us if you have any questions or require assistance.    Julieta Rodriguez  [phone]                .          "

## 2024-03-12 NOTE — PROGRESS NOTES
Pharmacist Renal Dose Adjustment Note    Myesha Quinones is a 83 y.o. female being treated with the medication levofloxacin    Patient Data:    Vital Signs (Most Recent):  Temp: 98.7 °F (37.1 °C) (03/12/24 1306)  Pulse: (!) 50 (03/12/24 1306)  Resp: 18 (03/12/24 1306)  BP: 90/64 (03/12/24 1306)  SpO2: 100 % (03/12/24 1306) Vital Signs (72h Range):  Temp:  [97.5 °F (36.4 °C)-99.4 °F (37.4 °C)]   Pulse:  []   Resp:  [16-20]   BP: ()/(60-80)   SpO2:  [92 %-100 %]      Recent Labs   Lab 03/08/24  0247 03/09/24  0247 03/12/24  1329   CREATININE 5.8* 5.2* 4.8*     Serum creatinine: 4.8 mg/dL (H) 03/12/24 1329  Estimated creatinine clearance: 7.2 mL/min (A)    Medication:levofloxacin dose: 250 mg frequency daily will be changed to medication:levofloxacin dose:500 mg frequency:q48h    Pharmacist's Name: Nadeen Oneal  Pharmacist's Extension: [phone]

## 2024-03-13 LAB
ANION GAP SERPL CALC-SCNC: 18 MMOL/L (ref 8–16)
BASOPHILS # BLD AUTO: 0.07 K/UL (ref 0–0.2)
BASOPHILS NFR BLD: 0.6 % (ref 0–1.9)
BUN SERPL-MCNC: 54 MG/DL (ref 8–23)
CALCIUM SERPL-MCNC: 6.8 MG/DL (ref 8.7–10.5)
CHLORIDE SERPL-SCNC: 94 MMOL/L (ref 95–110)
CO2 SERPL-SCNC: 27 MMOL/L (ref 23–29)
CREAT SERPL-MCNC: 5.2 MG/DL (ref 0.5–1.4)
DIFFERENTIAL METHOD BLD: ABNORMAL
EOSINOPHIL # BLD AUTO: 0.1 K/UL (ref 0–0.5)
EOSINOPHIL NFR BLD: 0.7 % (ref 0–8)
ERYTHROCYTE [DISTWIDTH] IN BLOOD BY AUTOMATED COUNT: 15.1 % (ref 11.5–14.5)
EST. GFR  (NO RACE VARIABLE): 8 ML/MIN/1.73 M^2
GLUCOSE SERPL-MCNC: 105 MG/DL (ref 70–110)
HCT VFR BLD AUTO: 25.3 % (ref 37–48.5)
HGB BLD-MCNC: 8 G/DL (ref 12–16)
IMM GRANULOCYTES # BLD AUTO: 0.29 K/UL (ref 0–0.04)
IMM GRANULOCYTES NFR BLD AUTO: 2.4 % (ref 0–0.5)
LYMPHOCYTES # BLD AUTO: 1.2 K/UL (ref 1–4.8)
LYMPHOCYTES NFR BLD: 10 % (ref 18–48)
MCH RBC QN AUTO: 31 PG (ref 27–31)
MCHC RBC AUTO-ENTMCNC: 31.6 G/DL (ref 32–36)
MCV RBC AUTO: 98 FL (ref 82–98)
MONOCYTES # BLD AUTO: 0.7 K/UL (ref 0.3–1)
MONOCYTES NFR BLD: 5.3 % (ref 4–15)
NEUTROPHILS # BLD AUTO: 10 K/UL (ref 1.8–7.7)
NEUTROPHILS NFR BLD: 81 % (ref 38–73)
NRBC BLD-RTO: 0 /100 WBC
PHOSPHATE SERPL-MCNC: 7.3 MG/DL (ref 2.7–4.5)
PLATELET # BLD AUTO: 189 K/UL (ref 150–450)
PMV BLD AUTO: 10.4 FL (ref 9.2–12.9)
POCT GLUCOSE: 210 MG/DL (ref 70–110)
POCT GLUCOSE: 216 MG/DL (ref 70–110)
POCT GLUCOSE: 244 MG/DL (ref 70–110)
POCT GLUCOSE: 98 MG/DL (ref 70–110)
POTASSIUM SERPL-SCNC: 3.8 MMOL/L (ref 3.5–5.1)
RBC # BLD AUTO: 2.58 M/UL (ref 4–5.4)
SODIUM SERPL-SCNC: 139 MMOL/L (ref 136–145)
WBC # BLD AUTO: 12.27 K/UL (ref 3.9–12.7)

## 2024-03-13 PROCEDURE — 25000003 PHARM REV CODE 250: Performed by: FAMILY MEDICINE

## 2024-03-13 PROCEDURE — 97161 PT EVAL LOW COMPLEX 20 MIN: CPT

## 2024-03-13 PROCEDURE — 96372 THER/PROPH/DIAG INJ SC/IM: CPT | Performed by: FAMILY MEDICINE

## 2024-03-13 PROCEDURE — 25000003 PHARM REV CODE 250: Performed by: STUDENT IN AN ORGANIZED HEALTH CARE EDUCATION/TRAINING PROGRAM

## 2024-03-13 PROCEDURE — 36415 COLL VENOUS BLD VENIPUNCTURE: CPT | Performed by: FAMILY MEDICINE

## 2024-03-13 PROCEDURE — 97116 GAIT TRAINING THERAPY: CPT

## 2024-03-13 PROCEDURE — 63600175 PHARM REV CODE 636 W HCPCS: Performed by: FAMILY MEDICINE

## 2024-03-13 PROCEDURE — 94761 N-INVAS EAR/PLS OXIMETRY MLT: CPT

## 2024-03-13 PROCEDURE — 97530 THERAPEUTIC ACTIVITIES: CPT

## 2024-03-13 PROCEDURE — 51798 US URINE CAPACITY MEASURE: CPT

## 2024-03-13 PROCEDURE — 85025 COMPLETE CBC W/AUTO DIFF WBC: CPT | Performed by: FAMILY MEDICINE

## 2024-03-13 PROCEDURE — 99214 OFFICE O/P EST MOD 30 MIN: CPT | Mod: ,,, | Performed by: UROLOGY

## 2024-03-13 PROCEDURE — 84100 ASSAY OF PHOSPHORUS: CPT | Performed by: FAMILY MEDICINE

## 2024-03-13 PROCEDURE — G0378 HOSPITAL OBSERVATION PER HR: HCPCS

## 2024-03-13 PROCEDURE — 27000221 HC OXYGEN, UP TO 24 HOURS

## 2024-03-13 PROCEDURE — 80048 BASIC METABOLIC PNL TOTAL CA: CPT | Performed by: FAMILY MEDICINE

## 2024-03-13 PROCEDURE — 99900035 HC TECH TIME PER 15 MIN (STAT)

## 2024-03-13 RX ORDER — TAMSULOSIN HYDROCHLORIDE 0.4 MG/1
0.4 CAPSULE ORAL DAILY
Status: DISCONTINUED | OUTPATIENT
Start: 2024-03-13 | End: 2024-03-14 | Stop reason: HOSPADM

## 2024-03-13 RX ORDER — CALCIUM CARBONATE 200(500)MG
1000 TABLET,CHEWABLE ORAL 2 TIMES DAILY
Status: DISCONTINUED | OUTPATIENT
Start: 2024-03-13 | End: 2024-03-14

## 2024-03-13 RX ADMIN — SODIUM BICARBONATE 650 MG: 650 TABLET ORAL at 05:03

## 2024-03-13 RX ADMIN — SEVELAMER CARBONATE 1600 MG: 800 TABLET, FILM COATED ORAL at 09:03

## 2024-03-13 RX ADMIN — PRAVASTATIN SODIUM 40 MG: 40 TABLET ORAL at 09:03

## 2024-03-13 RX ADMIN — INSULIN ASPART 2 UNITS: 100 INJECTION, SOLUTION INTRAVENOUS; SUBCUTANEOUS at 09:03

## 2024-03-13 RX ADMIN — SODIUM BICARBONATE 650 MG: 650 TABLET ORAL at 09:03

## 2024-03-13 RX ADMIN — ASPIRIN 81 MG CHEWABLE TABLET 81 MG: 81 TABLET CHEWABLE at 09:03

## 2024-03-13 RX ADMIN — TAMSULOSIN HYDROCHLORIDE 0.4 MG: 0.4 CAPSULE ORAL at 05:03

## 2024-03-13 RX ADMIN — FUROSEMIDE 80 MG: 40 TABLET ORAL at 09:03

## 2024-03-13 RX ADMIN — SODIUM BICARBONATE 650 MG: 650 TABLET ORAL at 12:03

## 2024-03-13 RX ADMIN — CALCITRIOL CAPSULES 0.25 MCG 0.5 MCG: 0.25 CAPSULE ORAL at 09:03

## 2024-03-13 RX ADMIN — Medication 100 MCG: at 09:03

## 2024-03-13 RX ADMIN — SEVELAMER CARBONATE 1600 MG: 800 TABLET, FILM COATED ORAL at 12:03

## 2024-03-13 RX ADMIN — ERGOCALCIFEROL 50000 UNITS: 1.25 CAPSULE ORAL at 09:03

## 2024-03-13 RX ADMIN — CALCIUM CARBONATE (ANTACID) CHEW TAB 500 MG 1000 MG: 500 CHEW TAB at 09:03

## 2024-03-13 RX ADMIN — CHOLECALCIFEROL TAB 25 MCG (1000 UNIT) 2000 UNITS: 25 TAB at 09:03

## 2024-03-13 RX ADMIN — CALCIUM CARBONATE (ANTACID) CHEW TAB 500 MG 1000 MG: 500 CHEW TAB at 12:03

## 2024-03-13 RX ADMIN — LEVOFLOXACIN 500 MG: 500 TABLET, FILM COATED ORAL at 09:03

## 2024-03-13 RX ADMIN — Medication 6 MG: at 09:03

## 2024-03-13 RX ADMIN — SEVELAMER CARBONATE 1600 MG: 800 TABLET, FILM COATED ORAL at 05:03

## 2024-03-13 RX ADMIN — INSULIN ASPART 4 UNITS: 100 INJECTION, SOLUTION INTRAVENOUS; SUBCUTANEOUS at 12:03

## 2024-03-13 NOTE — SUBJECTIVE & OBJECTIVE
Interval History: complains of urinary retention    Review of Systems   All other systems reviewed and are negative.    Objective:     Vital Signs (Most Recent):  Temp: 98.2 °F (36.8 °C) (03/13/24 1508)  Pulse: (!) 59 (03/13/24 1632)  Resp: 18 (03/13/24 1508)  BP: (!) 157/69 (03/13/24 1508)  SpO2: 99 % (03/13/24 1508) Vital Signs (24h Range):  Temp:  [97.7 °F (36.5 °C)-98.2 °F (36.8 °C)] 98.2 °F (36.8 °C)  Pulse:  [49-74] 59  Resp:  [8-18] 18  SpO2:  [90 %-100 %] 99 %  BP: (123-157)/(55-75) 157/69     Weight: 58.7 kg (129 lb 6.6 oz)  Body mass index is 22.92 kg/m².    Intake/Output Summary (Last 24 hours) at 3/13/2024 1639  Last data filed at 3/13/2024 1100  Gross per 24 hour   Intake 315 ml   Output 951 ml   Net -636 ml         Physical Exam  Vitals and nursing note reviewed.   Constitutional:       Appearance: She is well-developed.   HENT:      Head: Normocephalic and atraumatic.      Nose: Nose normal.   Eyes:      Conjunctiva/sclera: Conjunctivae normal.   Cardiovascular:      Rate and Rhythm: Normal rate and regular rhythm.      Heart sounds: Normal heart sounds.   Pulmonary:      Effort: Pulmonary effort is normal.      Breath sounds: Normal breath sounds. No wheezing.   Abdominal:      General: Bowel sounds are normal.      Palpations: Abdomen is soft. There is no mass.      Tenderness: There is no abdominal tenderness. There is no guarding or rebound.   Musculoskeletal:         General: No tenderness. Normal range of motion.      Cervical back: Normal range of motion and neck supple.   Skin:     General: Skin is warm.      Findings: No rash.   Neurological:      Mental Status: She is alert and oriented to person, place, and time.      Cranial Nerves: No cranial nerve deficit.   Psychiatric:         Behavior: Behavior normal.         Thought Content: Thought content normal.             Significant Labs: All pertinent labs within the past 24 hours have been reviewed.  BMP:   Recent Labs   Lab 03/12/24  1659  03/13/24  0318   * 105    139   K 3.8 3.8   CL 93* 94*   CO2 28 27   BUN 49* 54*   CREATININE 4.8* 5.2*   CALCIUM 6.8* 6.8*   MG 1.7  --      CBC:   Recent Labs   Lab 03/12/24  1329 03/13/24  0318   WBC 12.12 12.27   HGB 6.7* 8.0*   HCT 21.6* 25.3*    189       Significant Imaging: I have reviewed all pertinent imaging results/findings within the past 24 hours.  I have reviewed and interpreted all pertinent imaging results/findings within the past 24 hours.

## 2024-03-13 NOTE — HPI
Patient is an 84 yo F with PMH of ESRD on PD, type 2 diabetes mellitus, hypertension, obstructive airway disease, CAD, breast cancer, hyperlipidemia admitted yesterday for syncope.  Urology consulted for urinary retention.  Patient states that she had not urinated since yesterday.  She was bladder scanned which showed over 500 cc of urine retained.  She was then straight cath with 550 cc output.  She denies hematuria dysuria flank pain suprapubic pain.  She does have a history of urinary retention.  She has a pessary in place [facility] due to pelvic organ prolapse.  She reports constipation. There is no recent cross-sectional imaging available for review.

## 2024-03-13 NOTE — NURSING
Dr. Fairbanks @ bedside gave orders for a straight cath , and PT. Md also wants pt to seat on the bsc and try to urinate, then scan post urination and the straight cath is >300 ml.Sitting on the bsc

## 2024-03-13 NOTE — NURSING
Daughter called unit states pessary placed in bladder by JUSTYN Rivera to help with voiding and that pt thinks it came out that's why she has problems voiding.Sent a SC message to both Betsy Franco and Marina.

## 2024-03-13 NOTE — CONSULTS
03/13/2024    Chief complaint: pessary     HPI: 83 y.o. yo who is admitted for evaluation for syncopal episode. Patient reports she had a pessary placed 2 years ago for urinary retention. She had prolapse and it was thought to help. She did feel like it helped her urinate well. However since the more recent hospital admission she hasn't been able to urinate or have BM.     Past Medical History:   Diagnosis Date    Acute encephalopathy 02/29/2024    Acute hypoxemic respiratory failure 12/19/2019    Acute right-sided thoracic back pain 12/09/2019    Allergy     Asthma     Basal cell carcinoma     left forehead    Basal cell carcinoma     left nose    Basal cell carcinoma 05/20/2015    right nose    Basal cell carcinoma 12/22/2015    left lower post neck    Basal cell carcinoma 12/03/2019    left ant scalp     BCC (basal cell carcinoma of skin) 10/20/2022    Right alar groove    Bilateral pleural effusion     Bilateral renal cysts     Breast cancer     CAD (coronary artery disease)     Cardiomyopathy     Cardiomyopathy, ischemic     Cataract     Chest pain 03/04/2024    CHF (congestive heart failure)     CKD (chronic kidney disease) stage 4, GFR 15-29 ml/min     Colon polyp 2011    Controlled type 2 diabetes mellitus with both eyes affected by mild nonproliferative retinopathy and macular edema, with long-term current use of insulin 02/22/2018    COPD (chronic obstructive pulmonary disease)     COPD exacerbation 04/08/2018    Current mild episode of major depressive disorder without prior episode 01/25/2022    Defibrillator discharge     Dependence on renal dialysis 08/22/2023    Diabetes mellitus     Diabetes mellitus type II     Diabetes with neurologic complications     Edema     ESRD needing dialysis     Goals of care, counseling/discussion 07/08/2022    Goiter     MNG    Hematuria, unspecified     HX: breast cancer     Hyperlipidemia     Hypertension     Hypocalcemia     Hypokalemia     Hyponatremia      Hyponatremia 04/02/2022    Iron deficiency anemia 05/16/2017    Iron deficiency anemia     Iron deficiency anemia     Left kidney mass     Meningioma     Microalbuminuria due to type 2 diabetes mellitus 01/26/2022    Osteoporosis, postmenopausal     Pneumonia 12/08/2019    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    Proteinuria 01/21/2019    Pseudomonas pneumonia     SCC (squamous cell carcinoma) 08/25/2022    right posterior neck    Skin cancer     s/p excision    Sleep apnea     CPAP    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/2015    mid forehead    Unspecified vitamin D deficiency     UTI (urinary tract infection) 04/02/2022    Ventricular tachycardia     Vitamin B12 deficiency     Vitamin D deficiency disease      Past Surgical History:   Procedure Laterality Date    BASAL CELL CARCINOMA EXCISION      posterior neck and nose    BREAST BIOPSY      BREAST CYST EXCISION Left     BREAST SURGERY      CARDIAC DEFIBRILLATOR PLACEMENT      x 2    CATARACT EXTRACTION W/  INTRAOCULAR LENS IMPLANT Bilateral     CHOLECYSTECTOMY      COLONOSCOPY N/A 11/5/2019    Procedure: COLONOSCOPY;  Surgeon: Boaz Botello MD;  Location: [address]);  Service: Endoscopy;  Laterality: N/A;  [facility] -     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    FRACTURE SURGERY      left elbow and wrist as a child    HYSTERECTOMY      INSERTION, CATHETER, DIALYSIS, PERITONEAL, LAPAROSCOPIC N/A 4/25/2023    Procedure: INSERTION, CATHETER, DIALYSIS, PERITONEAL, LAPAROSCOPIC;  Surgeon: Marcelo Isaac MD;  Location: [facility] OR;  Service: General;  Laterality: N/A;    LAPAROSCOPIC LYSIS OF ADHESIONS N/A 4/25/2023    Procedure: LYSIS, ADHESIONS, LAPAROSCOPIC;  Surgeon: Marcelo Isaac MD;  Location: [facility] OR;  Service: General;  Laterality: N/A;    MASTECTOMY Right     OMENTOPEXY, LAPAROSCOPIC N/A 4/25/2023    Procedure: OMENTOPEXY, LAPAROSCOPIC;  Surgeon: Marcelo Isaac MD;  Location: [facility] OR;  Service: General;  Laterality:  N/A;    REPLACEMENT OF IMPLANTABLE CARDIOVERTER-DEFIBRILLATOR (ICD) GENERATOR N/A 12/17/2018    Procedure: REPLACEMENT, ICD GENERATOR;  Surgeon: Jan Mckeon MD;  Location: [facility] EP LAB;  Service: Cardiology;  Laterality: N/A;  DONNA, CRT-D gen VICKEY leone, MAC, SK, 3 Prep    REVISION OF SKIN POCKET FOR CARDIOVERTER-DEFIBRILLATOR  12/17/2018    Procedure: Revision, Skin Pocket, For Cardioverter-Defibrillator;  Surgeon: Jan Mckeon MD;  Location: [facility] EP LAB;  Service: Cardiology;;    SQUAMOUS CELL CARCINOMA EXCISION      remved from forehead    TONSILLECTOMY       Social History     Socioeconomic History    Marital status:    Occupational History    Occupation: Homemaker     Employer: OTHER   Tobacco Use    Smoking status: Never     Passive exposure: Never    Smokeless tobacco: Never   Substance and Sexual Activity    Alcohol use: No     Alcohol/week: 0.0 standard drinks of alcohol    Drug use: No    Sexual activity: Yes     Partners: Male   Other Topics Concern    Are you pregnant or think you may be? No    Breast-feeding No     Social Determinants of Health     Financial Resource Strain: Low Risk  (8/22/2023)    Overall Financial Resource Strain (CARDIA)     Difficulty of Paying Living Expenses: Not hard at all   Food Insecurity: No Food Insecurity (8/22/2023)    Hunger Vital Sign     Worried About Running Out of Food in the Last Year: Never true     Ran Out of Food in the Last Year: Never true   Transportation Needs: No Transportation Needs (8/22/2023)    PRAPARE - Transportation     Lack of Transportation (Medical): No     Lack of Transportation (Non-Medical): No   Physical Activity: Inactive (8/22/2023)    Exercise Vital Sign     Days of Exercise per Week: 0 days     Minutes of Exercise per Session: 0 min   Stress: No Stress Concern Present (8/22/2023)    [employer] - Occupational Stress Questionnaire     Feeling of Stress : Only a little   Social Connections: Moderately  Isolated (8/22/2023)    Social Connection and Isolation Panel [NHANES]     Frequency of Communication with Friends and Family: More than three times a week     Frequency of Social Gatherings with Friends and Family: More than three times a week     Attends Caodaism Services: More than 4 times per year     Active Member of Clubs or Organizations: No     Attends Club or Organization Meetings: Never     Marital Status:    Housing Stability: Low Risk  (8/22/2023)    Housing Stability Vital Sign     Unable to Pay for Housing in the Last Year: No     Number of Places Lived in the Last Year: 1     Unstable Housing in the Last Year: No     Family History   Problem Relation Age of Onset    Asthma Mother     Hypertension Mother     Stroke Mother     Diabetes Father     Cardiomyopathy Father     Diabetes Sister     Heart disease Sister     Heart attack Sister     Heart attack Brother     Diabetes Brother     Heart disease Brother     Hypertension Brother     Diabetes Brother     Heart disease Brother     Hypertension Brother     Cerebral aneurysm Brother     Diabetes Brother     Heart disease Brother     Cancer Brother         colon    Diabetes Brother     Diabetes Daughter         prediabetes    Cancer Daughter         melanoma    Obesity Daughter     Melanoma Daughter     Cancer Son         skin    Diabetes Son         prediabetes    Diabetes Son     No Known Problems Maternal Grandmother     No Known Problems Maternal Grandfather     No Known Problems Paternal Grandmother     No Known Problems Paternal Grandfather     Amblyopia Neg Hx     Blindness Neg Hx     Cataracts Neg Hx     Glaucoma Neg Hx     Macular degeneration Neg Hx     Retinal detachment Neg Hx     Strabismus Neg Hx     Thyroid disease Neg Hx      Review of patient's allergies indicates:   Allergen Reactions    Iodine and iodide containing products Hives and Other (See Comments)     Not specified     Nifedipine      weakness         ROS:   Negative      Temp:  [97.7 °F (36.5 °C)-98.2 °F (36.8 °C)]   Pulse:  [50-71]   Resp:  [8-18]   BP: (134-157)/(64-75)   SpO2:  [90 %-99 %]     APPEARANCE: Well nourished, well developed, in no acute distress.  NEUROLOGIC: orientated to person, place and time, normal mood and affect     PELVIC:   EXTERNAL GENITALIA/VULVA: erythema and barrier cream over the labia/buttocks/perineum Normal female genitalia. Pessary appears to be coming out the vagina with knob at level of urethra, palpation done and not felt to be eroded, removed easily   URETHRAL MEATUS: Normal size and location, no lesions, no prolapse.  URETHRA: No masses, tenderness, prolapse or scarring.  BLADDER: non-tender, no masses  VAGINA: atrophic, no discharge, limited exam in bed, small erosion at 6 o'clock at introitus on posterior aspect of the vagina.     A/P:   Possible that pessary was causing urethral compression and preventing urination. Needs to stay out until area on rectum heals, likely after discharge since they are anticipating discharge soon. Ok to continue estrogen cream. May need different size or pessary without knob but should f/u with uro-gyn after discharge.

## 2024-03-13 NOTE — HOSPITAL COURSE
Pt admitted and antihypertensives held except for lasix. Given 2 u prbcs with great response. Hb at 8. Nephro consulted for dialysis. Pt has urinary retention and bladder scans showed more than 500cc several times. To be scan every 6 hours. Urology consulted and Would try to leave catheter out if possible but can re-insert if unable to void and needed. If catheter is re-inserted, can be discharged with lauren and can follow up with Kaitlyn Ruffin NP for removal. Pt is d/c f/u with lauren. Daughter states her pessary had fallen out and the pessary had helped with urinary retention. Gyn is consult: Possible that pessary was causing urethral compression and preventing urination. Needs to stay out until area on rectum heals, likely after discharge since they are anticipating discharge soon. Ok to continue estrogen cream. May need different size or pessary without knob but should f/u with uro-gyn after discharge.

## 2024-03-13 NOTE — PROGRESS NOTES
[facility] Medicine  Progress Note    Patient Name: Myesha Quinones  MRN: [id number]  Patient Class: OP- Observation   Admission Date: 3/12/2024  Length of Stay: 0 days  Attending Physician: Maureen Gray MD  Primary Care Provider: Dayton Michael MD        Subjective:     Principal Problem:Syncope        HPI:  83-year-old female with PMH of ESRD on PD, type 2 diabetes mellitus, hypertension, obstructive airway disease, CAD, breast cancer, hyperlipidemia brought to the ER for syncopal episode by family. Patient had gotten up to go to the bathroom with the help of her family. Got up and noticed to have LOC and tonic clonic jerking reported.  Pt was out for possibly 5min. Noted to have similar episodes on the last admission. Was discharged yesterday for pneumonia with pleural effusion. . EMS arrived shortly after and noted the hypotension with sbps in the 80s.Pt was supposed to take valsartan 80mg but didn't get the prescription. Daughter cut the medication to 160 from 380mg. . Denies any CP or sob. No other symptoms reported at this time     presented with fatigue, lethargy, periodic involuntary jerks found to have Pseudomonas pneumonia with uncomplicated parapneumonic effusion. Underwent left thoracentesis, pleural fluid cultures negative so far. Respiratory cultures grew pansensitive Pseudomonas. Patient was seen by pulmonology and recommended 4-6 weeks of antibiotics for Pseudomonas pneumonia. Patient's symptoms improved but required supplemental oxygen at discharge. Discharged on levofloxacin 250 mg daily, end date 03/31 for 4 weeks therapy. Frequency of PD was increased from twice a week to daily as uremia thought to be contributing to periodic involuntary jerks. With daily PD, involuntary movements resolved. Antihypertensives initially held given borderline low blood pressures, resumed at lower doses at the time of discharge.      CXR in the ER showed edema with left pleural effusion  and hb of 6.7. Hypotension with sbp in the 90 in the ER    Overview/Hospital Course:  Pt admitted and antihypertensives except for lasix held. Given 2 u prbcs with great response. Hb at 8. Nephro consulted for dialysis. Pt has urinary retention and bladder scans showed more than 500cc several times. To be scan every 6 hours. Urology consulted. Daughter states her pessary had fallen out and the pessary had helped with urinary retention. Gyn is consulted to assist    Interval History: complains of urinary retention    Review of Systems   All other systems reviewed and are negative.    Objective:     Vital Signs (Most Recent):  Temp: 98.2 °F (36.8 °C) (03/13/24 1508)  Pulse: (!) 59 (03/13/24 1632)  Resp: 18 (03/13/24 1508)  BP: (!) 157/69 (03/13/24 1508)  SpO2: 99 % (03/13/24 1508) Vital Signs (24h Range):  Temp:  [97.7 °F (36.5 °C)-98.2 °F (36.8 °C)] 98.2 °F (36.8 °C)  Pulse:  [49-74] 59  Resp:  [8-18] 18  SpO2:  [90 %-100 %] 99 %  BP: (123-157)/(55-75) 157/69     Weight: 58.7 kg (129 lb 6.6 oz)  Body mass index is 22.92 kg/m².    Intake/Output Summary (Last 24 hours) at 3/13/2024 1639  Last data filed at 3/13/2024 1100  Gross per 24 hour   Intake 315 ml   Output 951 ml   Net -636 ml         Physical Exam  Vitals and nursing note reviewed.   Constitutional:       Appearance: She is well-developed.   HENT:      Head: Normocephalic and atraumatic.      Nose: Nose normal.   Eyes:      Conjunctiva/sclera: Conjunctivae normal.   Cardiovascular:      Rate and Rhythm: Normal rate and regular rhythm.      Heart sounds: Normal heart sounds.   Pulmonary:      Effort: Pulmonary effort is normal.      Breath sounds: Normal breath sounds. No wheezing.   Abdominal:      General: Bowel sounds are normal.      Palpations: Abdomen is soft. There is no mass.      Tenderness: There is no abdominal tenderness. There is no guarding or rebound.   Musculoskeletal:         General: No tenderness. Normal range of motion.      Cervical back:  Normal range of motion and neck supple.   Skin:     General: Skin is warm.      Findings: No rash.   Neurological:      Mental Status: She is alert and oriented to person, place, and time.      Cranial Nerves: No cranial nerve deficit.   Psychiatric:         Behavior: Behavior normal.         Thought Content: Thought content normal.             Significant Labs: All pertinent labs within the past 24 hours have been reviewed.  BMP:   Recent Labs   Lab 03/12/24  1329 03/13/24  0318   * 105    139   K 3.8 3.8   CL 93* 94*   CO2 28 27   BUN 49* 54*   CREATININE 4.8* 5.2*   CALCIUM 6.8* 6.8*   MG 1.7  --      CBC:   Recent Labs   Lab 03/12/24  1329 03/13/24  0318   WBC 12.12 12.27   HGB 6.7* 8.0*   HCT 21.6* 25.3*    189       Significant Imaging: I have reviewed all pertinent imaging results/findings within the past 24 hours.  I have reviewed and interpreted all pertinent imaging results/findings within the past 24 hours.    Assessment/Plan:      * Syncope  Echo done recently march 1st   Carotid u/s with less than 50% blockage  Antihypertensive held  Suspect syncope from hypotension combined with acute on chronic anemia      ESRD on peritoneal dialysis  Nephro consult  Dialysis per nephro      Urinary retention    Bladder scan q6hr  Urology consulted  Flomax started      Gyn consulted to assist with pessary placement since it was displaced. Per daughter, had helped with incontinence in the past    Community acquired pneumonia  Cont levaquin from previous admission      Acute on chronic combined systolic and diastolic heart failure  Dialysis per nephro      Iron deficiency anemia  Patient's anemia is currently uncontrolled. Has received 1 units of PRBCs on 3/12/24 . Etiology likely d/t chronic disease due to ESRD  Current CBC reviewed-   Lab Results   Component Value Date    HGB 6.7 (L) 03/12/2024    HCT 21.6 (L) 03/12/2024     Monitor serial CBC and transfuse if patient becomes hemodynamically  unstable, symptomatic or H/H drops below 7/21.    Diabetic polyneuropathy associated with type 2 diabetes mellitus  Resume home meds  ISS and monitor      Type 2 diabetes mellitus with stage 4 chronic kidney disease and hypertension  Creatine stable for now. BMP reviewed- noted Estimated Creatinine Clearance: 7.2 mL/min (A) (based on SCr of 4.8 mg/dL (H)). according to latest data. 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  Monitor UOP and serial BMP and adjust therapy as needed. Renally dose meds. Avoid nephrotoxic medications and procedures.      VTE Risk Mitigation (From admission, onward)           Ordered     IP VTE HIGH RISK PATIENT  Once         03/12/24 1507     Place sequential compression device  Until discontinued         03/12/24 1507                    Discharge Planning   ALIX: 3/15/2024     Code Status: Full Code   Is the patient medically ready for discharge?:     Reason for patient still in hospital (select all that apply): Treatment  Discharge Plan A: Home, Home with family                  Maureen Gray MD  Department of Hospital Medicine   [facility]

## 2024-03-13 NOTE — PLAN OF CARE
Problem: Adult Inpatient Plan of Care  Goal: Plan of Care Review  Outcome: Ongoing, Progressing     Problem: Infection (Pneumonia)  Goal: Resolution of Infection Signs and Symptoms  Outcome: Ongoing, Progressing     Problem: Fall Injury Risk  Goal: Absence of Fall and Fall-Related Injury  Outcome: Ongoing, Progressing     Problem: Syncope  Goal: Absence of Syncopal Symptoms  Outcome: Ongoing, Progressing

## 2024-03-13 NOTE — NURSING
Dr Fairbanks on the unit verbalized to order bladder scan Q 6 hrs if >300 straight cath. Order placed.

## 2024-03-13 NOTE — ASSESSMENT & PLAN NOTE
Echo done recently march 1st   Carotid u/s with less than 50% blockage  Antihypertensive held  Suspect syncope from hypotension combined with acute on chronic anemia

## 2024-03-13 NOTE — CONSULTS
[facility] - Telemetry  Urology  Consult Note    Patient Name: Myesha Quinones  MRN: [id number]  Admission Date: 3/12/2024  Hospital Length of Stay: 0   Code Status: Full Code   Attending Provider: Maureen Gray MD   Consulting Provider: Raina Chan MD  Primary Care Physician: Dayton Michael MD  Principal Problem:Syncope    Inpatient consult to Urology  Consult performed by: Raina Chan MD  Consult ordered by: Maureen Gray MD          Subjective:     HPI:  Patient is an 84 yo F with PMH of ESRD on PD, type 2 diabetes mellitus, hypertension, obstructive airway disease, CAD, breast cancer, hyperlipidemia admitted yesterday for syncope.  Urology consulted for urinary retention.  Patient states that she had not urinated since yesterday.  She was bladder scanned which showed over 500 cc of urine retained.  She was then straight cath with 550 cc output.  She denies hematuria dysuria flank pain suprapubic pain.  She does have a history of urinary retention.  She has a pessary in place from OBGYN due to pelvic organ prolapse.  She reports constipation. There is no recent cross-sectional imaging available for review.    Past Medical History:   Diagnosis Date    Acute encephalopathy 02/29/2024    Acute hypoxemic respiratory failure 12/19/2019    Acute right-sided thoracic back pain 12/09/2019    Allergy     Asthma     Basal cell carcinoma     left forehead    Basal cell carcinoma     left nose    Basal cell carcinoma 05/20/2015    right nose    Basal cell carcinoma 12/22/2015    left lower post neck    Basal cell carcinoma 12/03/2019    left ant scalp     BCC (basal cell carcinoma of skin) 10/20/2022    Right alar groove    Bilateral pleural effusion     Bilateral renal cysts     Breast cancer     CAD (coronary artery disease)     Cardiomyopathy     Cardiomyopathy, ischemic     Cataract     Chest pain 03/04/2024    CHF (congestive heart failure)     CKD (chronic kidney disease) stage 4, GFR  15-29 ml/min     Colon polyp 2011    Controlled type 2 diabetes mellitus with both eyes affected by mild nonproliferative retinopathy and macular edema, with long-term current use of insulin 02/22/2018    COPD (chronic obstructive pulmonary disease)     COPD exacerbation 04/08/2018    Current mild episode of major depressive disorder without prior episode 01/25/2022    Defibrillator discharge     Dependence on renal dialysis 08/22/2023    Diabetes mellitus     Diabetes mellitus type II     Diabetes with neurologic complications     Edema     ESRD needing dialysis     Goals of care, counseling/discussion 07/08/2022    Goiter     MNG    Hematuria, unspecified     HX: breast cancer     Hyperlipidemia     Hypertension     Hypocalcemia     Hypokalemia     Hyponatremia     Hyponatremia 04/02/2022    Iron deficiency anemia 05/16/2017    Iron deficiency anemia     Iron deficiency anemia     Left kidney mass     Meningioma     Microalbuminuria due to type 2 diabetes mellitus 01/26/2022    Osteoporosis, postmenopausal     Pneumonia 12/08/2019    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    Proteinuria 01/21/2019    Pseudomonas pneumonia     SCC (squamous cell carcinoma) 08/25/2022    right posterior neck    Skin cancer     s/p excision    Sleep apnea     CPAP    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/2015    mid forehead    Unspecified vitamin D deficiency     UTI (urinary tract infection) 04/02/2022    Ventricular tachycardia     Vitamin B12 deficiency     Vitamin D deficiency disease        Past Surgical History:   Procedure Laterality Date    BASAL CELL CARCINOMA EXCISION      posterior neck and nose    BREAST BIOPSY      BREAST CYST EXCISION Left     BREAST SURGERY      CARDIAC DEFIBRILLATOR PLACEMENT      x 2    CATARACT EXTRACTION W/  INTRAOCULAR LENS IMPLANT Bilateral     CHOLECYSTECTOMY      COLONOSCOPY N/A 11/5/2019    Procedure: COLONOSCOPY;  Surgeon: Boaz Botello MD;  Location: [facility] ([address] [place] [address]);  Service:  Endoscopy;  Laterality: N/A;  AICD - [employer] -     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    FRACTURE SURGERY      left elbow and wrist as a child    HYSTERECTOMY      INSERTION, CATHETER, DIALYSIS, PERITONEAL, LAPAROSCOPIC N/A 4/25/2023    Procedure: INSERTION, CATHETER, DIALYSIS, PERITONEAL, LAPAROSCOPIC;  Surgeon: Marcelo Isaac MD;  Location: [facility] OR;  Service: General;  Laterality: N/A;    LAPAROSCOPIC LYSIS OF ADHESIONS N/A 4/25/2023    Procedure: LYSIS, ADHESIONS, LAPAROSCOPIC;  Surgeon: Marcelo Isaac MD;  Location: [facility] OR;  Service: General;  Laterality: N/A;    MASTECTOMY Right     OMENTOPEXY, LAPAROSCOPIC N/A 4/25/2023    Procedure: OMENTOPEXY, LAPAROSCOPIC;  Surgeon: Marcelo Isaac MD;  Location: [facility] OR;  Service: General;  Laterality: N/A;    REPLACEMENT OF IMPLANTABLE CARDIOVERTER-DEFIBRILLATOR (ICD) GENERATOR N/A 12/17/2018    Procedure: REPLACEMENT, ICD GENERATOR;  Surgeon: Jan Mckeon MD;  Location: [facility] EP LAB;  Service: Cardiology;  Laterality: N/A;  DONNA, CRT-D gen change, MDT, MAC, SK, 3 Prep    REVISION OF SKIN POCKET FOR CARDIOVERTER-DEFIBRILLATOR  12/17/2018    Procedure: Revision, Skin Pocket, For Cardioverter-Defibrillator;  Surgeon: Jan Mckeon MD;  Location: [facility] EP LAB;  Service: Cardiology;;    SQUAMOUS CELL CARCINOMA EXCISION      remved from forehead    TONSILLECTOMY         Review of patient's allergies indicates:   Allergen Reactions    Iodine and iodide containing products Hives and Other (See Comments)     Not specified     Nifedipine      weakness       Family History       Problem Relation (Age of Onset)    Asthma Mother    Cancer Brother, Daughter, Son    Cardiomyopathy Father    Cerebral aneurysm Brother    Diabetes Father, Sister, Brother, Brother, Brother, Brother, Daughter, Son, Son    Heart attack Sister, Brother    Heart disease Sister, Brother, Brother, Brother    Hypertension Mother, Brother, Brother    Melanoma Daughter    No Known  Problems Maternal Grandmother, Maternal Grandfather, Paternal Grandmother, Paternal Grandfather    Obesity Daughter    Stroke Mother            Tobacco Use    Smoking status: Never     Passive exposure: Never    Smokeless tobacco: Never   Substance and Sexual Activity    Alcohol use: No     Alcohol/week: 0.0 standard drinks of alcohol    Drug use: No    Sexual activity: Yes     Partners: Male       Review of Systems   Constitutional:  Negative for activity change, appetite change, chills, fever and unexpected weight change.   Respiratory:  Negative for shortness of breath.    Cardiovascular:  Negative for chest pain.   Gastrointestinal:  Negative for abdominal pain, constipation, diarrhea, nausea and vomiting.   Genitourinary:  Positive for difficulty urinating. Negative for dysuria, flank pain and hematuria.   Musculoskeletal:  Negative for back pain.   Skin:  Negative for wound.   Neurological:  Negative for headaches.   Psychiatric/Behavioral:  Negative for confusion.        Objective:     Temp:  [97.7 °F (36.5 °C)-98.2 °F (36.8 °C)] 98.2 °F (36.8 °C)  Pulse:  [49-74] 59  Resp:  [8-18] 18  SpO2:  [90 %-100 %] 99 %  BP: (123-157)/(55-75) 157/69  Weight: 58.7 kg (129 lb 6.6 oz)  Body mass index is 22.92 kg/m².    Date 03/13/24 0700 - 03/14/24 0659   Shift 1600-2958 1059-2857 9692-7504 24 Hour Total   INTAKE   Shift Total(mL/kg)       OUTPUT   Urine(mL/kg/hr) 950(2)   950   Stool 1   1   Shift Total(mL/kg) 951(16.2)   951(16.2)   Weight (kg) 58.7 58.7 58.7 58.7     Bladder Scan Volume (mL): 500 mL (03/13/24 1100)    Drains       None                    Physical Exam  Vitals reviewed.   Constitutional:       General: She is not in acute distress.     Appearance: She is not diaphoretic.   HENT:      Head: Normocephalic and atraumatic.      Nose: Nose normal.   Eyes:      Conjunctiva/sclera: Conjunctivae normal.   Cardiovascular:      Rate and Rhythm: Normal rate.   Pulmonary:      Effort: Pulmonary effort is normal.  No respiratory distress.   Abdominal:      General: There is no distension.      Palpations: Abdomen is soft.      Tenderness: There is no abdominal tenderness. There is no right CVA tenderness, left CVA tenderness, guarding or rebound.      Comments: PD catheter in place   Genitourinary:     Comments: Diaper and pure wick in place  Musculoskeletal:         General: Normal range of motion.      Cervical back: Normal range of motion.   Skin:     General: Skin is dry.   Neurological:      Mental Status: She is alert.   Psychiatric:         Behavior: Behavior normal.         Thought Content: Thought content normal.          Significant Labs:    BMP:  Recent Labs   Lab 03/09/24  0247 03/11/24  0917 03/12/24  1329 03/13/24  0318     --  139 139   K 3.2* 3.8 3.8 3.8   CL 94*  --  93* 94*   CO2 22*  --  28 27   BUN 52*  --  49* 54*   CREATININE 5.2*  --  4.8* 5.2*   CALCIUM 7.0*  --  6.8* 6.8*       CBC:  Recent Labs   Lab 03/08/24 0247 03/12/24  1329 03/13/24  0318   WBC 13.19* 12.12 12.27   HGB 7.6* 6.7* 8.0*   HCT 24.0* 21.6* 25.3*    160 189       All pertinent labs results from the past 24 hours have been reviewed.    Significant Imaging:  All pertinent imaging results/findings from the past 24 hours have been reviewed.                    Assessment and Plan:     Urinary retention  83-year-old female with history of urinary retention.  550 cc of clear urine returned after straight catheterization this morning.    -Recommend serial bladder scans.  If patient is unable to void and has a bladder scan over 400 cc recommend nursing place an indwelling De La Cruz catheter.  -If an indwelling De La Cruz catheter is placed, please have case management arrange for the patient to see Urology next week for a voiding trial with our nurse practitioner.  -Recommend patient ambulate to maximum ability, avoid narcotics, avoid constipation, continue Flomax.  -Rest of care per primary.        VTE Risk Mitigation (From admission,  onward)           Ordered     IP VTE HIGH RISK PATIENT  Once         03/12/24 1507     Place sequential compression device  Until discontinued         03/12/24 1507                    Thank you for your consult. I will sign off. Please contact us if you have any additional questions.    Raina Chan MD  Urology  [facility] - Telemetry

## 2024-03-13 NOTE — ASSESSMENT & PLAN NOTE
83-year-old female with history of urinary retention.  550 cc of clear urine returned after straight catheterization this morning.    Recommend serial bladder scans.  If patient is unable to void and has a bladder scan over 400 cc recommend nursing place an indwelling De La Cruz catheter.  If an indwelling De La Cruz catheter is placed, please have case management arrange for the patient to see Urology next week for a voiding trial with our nurse practitioner.  Recommend patient ambulate to maximum ability, avoid narcotics, avoid constipation.  Rest of care per primary.

## 2024-03-13 NOTE — PROGRESS NOTES
Pt arrived to unit. Introduced self as VN for this shift. Admission questions completed by VN. Educated pt on VTE risk, safety precautions, and VN's role in pt care. Opportunity given for pt's questions. All questions answered.      03/12/24 1945   Admission   Initial VN Admission Questions Complete   Shift   Virtual Nurse - Rounding Complete   Virtual Nurse - Patient Verbalized Approval Of Camera Use;VN Rounding   Type of Frequent Check   Type Patient Rounds   Safety/Activity   Patient Rounds bed in low position;bed wheels locked;call light in patient/parent reach;clutter free environment maintained;ID band on;visualized patient;toileting offered;placement of personal items at bedside   Safety Promotion/Fall Prevention assistive device/personal item within reach   Safety Precautions emergency equipment at bedside   Activity Assistance Provided assistance, 1 person   Positioning   Body Position position maintained;supine   Pain/Comfort/Sleep   Comfort/Acceptable Pain Level 0   Pain Rating (0-10): Rest 0

## 2024-03-13 NOTE — NURSING
Pt states did not have dialysis yesterday and has not urinated since yesterday. Bladder scanned 512 ml observed. Called Dr. Fairbanks's office spoke to the nurse and left a message.Secure Charted Dr. Fairbanks on the same no new orders received

## 2024-03-13 NOTE — ASSESSMENT & PLAN NOTE
Bladder scan q6hr  Urology consulted  Flomax started      Gyn consulted to assist with pessary placement since it was displaced. Per daughter, had helped with incontinence in the past

## 2024-03-13 NOTE — CONSULTS
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   female who  has a past medical history of Acute encephalopathy (02/29/2024), Acute hypoxemic respiratory failure (12/19/2019), Acute right-sided thoracic back pain (12/09/2019), Allergy, Asthma, Basal cell carcinoma, Basal cell carcinoma, Basal cell carcinoma (05/20/2015), Basal cell carcinoma (12/22/2015), Basal cell carcinoma (12/03/2019), BCC (basal cell carcinoma of skin) (10/20/2022), Bilateral pleural effusion, Bilateral renal cysts, Breast cancer, CAD (coronary artery disease), Cardiomyopathy, Cardiomyopathy, ischemic, Cataract, Chest pain (03/04/2024), CHF (congestive heart failure), CKD (chronic kidney disease) stage 4, GFR 15-29 ml/min, Colon polyp (2011), 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, with long-term current use of insulin (02/22/2018), COPD (chronic obstructive pulmonary disease), COPD exacerbation (04/08/2018), Current mild episode of major depressive disorder without prior episode (01/25/2022), Defibrillator discharge, Dependence on renal dialysis (08/22/2023), Diabetes mellitus, Diabetes mellitus type II, Diabetes with neurologic complications, Edema, ESRD needing dialysis, Goals of care, counseling/discussion (07/08/2022), Goiter, Hematuria, unspecified, breast cancer, Hyperlipidemia, Hypertension, Hypocalcemia, Hypokalemia, Hyponatremia, Hyponatremia (04/02/2022), Iron deficiency anemia (05/16/2017), Iron deficiency anemia, Iron deficiency anemia, Left kidney mass, Meningioma, Microalbuminuria due to type 2 diabetes mellitus (01/26/2022), Osteoporosis, postmenopausal, Pneumonia (12/08/2019), 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 She is PD patient follows with Dr Borrero, PD Monday and Thursday 1 exchange over 4hr. The patient presented [facility] the [facility]2024 with a primary complaint of near syncope after eating breakfast and taking her BP meds, she supposed to see her PD nurse that AM. She was recently admitted 2/29/24 for PNA   AMS  low  Temp all resolved.       Review of Systems   Constitutional:  Negative for chills, fever and malaise/fatigue.   HENT:  Negative for congestion and sore throat.    Eyes:  Negative for blurred vision, double vision and photophobia.   Respiratory:  Negative for cough and shortness of breath.    Cardiovascular:  Negative for chest pain, palpitations and leg swelling.   Gastrointestinal:  Negative for abdominal pain, diarrhea, nausea and vomiting.   Genitourinary:  Negative for dysuria and urgency.   Musculoskeletal:  Negative for joint pain and myalgias.   Skin:  Negative for itching and rash.   Neurological:  Positive for weakness. Negative for dizziness, sensory change and headaches.   Endo/Heme/Allergies:  Negative for polydipsia. Does not bruise/bleed easily.   Psychiatric/Behavioral:  Negative for depression.        Past Medical History:   Diagnosis Date    Acute encephalopathy 02/29/2024    Acute hypoxemic respiratory failure 12/19/2019    Acute right-sided thoracic back pain 12/09/2019    Allergy     Asthma     Basal cell carcinoma     left forehead    Basal cell carcinoma     left nose    Basal cell carcinoma 05/20/2015    right nose    Basal cell carcinoma 12/22/2015    left lower post neck    Basal cell carcinoma 12/03/2019    left ant scalp     BCC (basal cell carcinoma of skin) 10/20/2022    Right alar groove    Bilateral pleural effusion      Bilateral renal cysts     Breast cancer     CAD (coronary artery disease)     Cardiomyopathy     Cardiomyopathy, ischemic     Cataract     Chest pain 03/04/2024    CHF (congestive heart failure)     CKD (chronic kidney disease) stage 4, GFR 15-29 ml/min     Colon polyp 2011    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, with long-term current use of insulin 02/22/2018    COPD (chronic obstructive pulmonary disease)     COPD exacerbation 04/08/2018    Current mild episode of major depressive disorder without prior episode 01/25/2022    Defibrillator discharge     Dependence on renal dialysis 08/22/2023    Diabetes mellitus     Diabetes mellitus type II     Diabetes with neurologic complications     Edema     ESRD needing dialysis     Goals of care, counseling/discussion 07/08/2022    Goiter     MNG    Hematuria, unspecified     HX: breast cancer     Hyperlipidemia     Hypertension     Hypocalcemia     Hypokalemia     Hyponatremia     Hyponatremia 04/02/2022    Iron deficiency anemia 05/16/2017    Iron deficiency anemia     Iron deficiency anemia     Left kidney mass     Meningioma     Microalbuminuria due to type 2 diabetes mellitus 01/26/2022    Osteoporosis, postmenopausal     Pneumonia 12/08/2019    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    Proteinuria 01/21/2019    Pseudomonas pneumonia     SCC (squamous cell carcinoma) 08/25/2022    right posterior neck    Skin cancer     s/p excision    Sleep apnea     CPAP    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/2015    mid forehead    Unspecified vitamin D deficiency     UTI (urinary tract infection) 04/02/2022    Ventricular tachycardia     Vitamin B12 deficiency     Vitamin D deficiency disease          OBJECTIVE:     Vital Signs (Most Recent)  Vitals:    03/13/24 0430 03/13/24 0537 03/13/24 0818 03/13/24 0833   BP:  (!) 152/64  134/75   BP Location:  Right arm  Right arm   Patient Position:  Lying   Lying   Pulse: 66 (!) 50 (!) 58 64   Resp:  18 (!) 8 18   Temp:  98.1 °F (36.7 °C)  97.7 °F (36.5 °C)   TempSrc:  Oral  Oral   SpO2:  97% (!) 90% (!) 94%   Weight:  58.7 kg (129 lb 6.6 oz)                     Medications:   aspirin  81 mg Oral Daily    calcitRIOL  0.5 mcg Oral Daily    cyanocobalamin  100 mcg Oral Daily    ergocalciferol  50,000 Units Oral Q7 Days    fluticasone propionate  1 spray Each Nostril Daily    furosemide  80 mg Oral BID    levoFLOXacin  500 mg Oral Every other day    polyethylene glycol  17 g Oral Daily    pravastatin  40 mg Oral Daily    sevelamer carbonate  1,600 mg Oral TID    sodium bicarbonate  650 mg Oral QID    vitamin D  2,000 Units Oral Daily           Physical Exam  Vitals and nursing note reviewed.   Constitutional:       General: She is not in acute distress.     Appearance: She is not diaphoretic.      Comments: Frail    HENT:      Head: Normocephalic and atraumatic.      Mouth/Throat:      Pharynx: No oropharyngeal exudate.   Eyes:      General: No scleral icterus.     Conjunctiva/sclera: Conjunctivae normal.      Pupils: Pupils are equal, round, and reactive to light.   Cardiovascular:      Rate and Rhythm: Normal rate and regular rhythm.      Heart sounds: Normal heart sounds. No murmur heard.  Pulmonary:      Effort: Pulmonary effort is normal. No respiratory distress.      Breath sounds: Examination of the left-lower field reveals rales. Rales present.   Abdominal:      General: Bowel sounds are normal. There is no distension.      Palpations: Abdomen is soft.      Tenderness: There is no abdominal tenderness.      Comments: PD catheter site no drainage no TTP    Musculoskeletal:         General: Normal range of motion.      Cervical back: Normal range of motion and neck supple.   Skin:     General: Skin is warm and dry.      Findings: No erythema.   Neurological:      Mental Status: She is alert and oriented to person, place, and time.      Cranial Nerves: No cranial  nerve deficit.      Motor: Weakness present.   Psychiatric:         Mood and Affect: Affect normal.         Cognition and Memory: Memory normal.         Judgment: Judgment normal.         Laboratory:  Recent Labs   Lab 03/08/24  0247 03/12/24  1329 03/13/24  0318   WBC 13.19* 12.12 12.27   HGB 7.6* 6.7* 8.0*   HCT 24.0* 21.6* 25.3*    160 189   MONO 7.8  1.0 5.6  0.7 5.3  0.7   EOSINOPHIL 1.1 1.2 0.7       Recent Labs   Lab 03/07/24  0449 03/08/24  0247 03/09/24  0247 03/11/24  0917 03/12/24  1329 03/13/24 0318   *   < > 137  --  139 139   K 3.4*   < > 3.2* 3.8 3.8 3.8   CL 95   < > 94*  --  93* 94*   CO2 24   < > 22*  --  28 27   BUN 68*   < > 52*  --  49* 54*   CREATININE 5.7*   < > 5.2*  --  4.8* 5.2*   CALCIUM 6.5*   < > 7.0*  --  6.8* 6.8*   PHOS 8.8*  --   --   --   --  7.3*    < > = values in this interval not displayed.         Diagnostic Results:  X-Ray: Reviewed  US: Reviewed  Echo: Reviewed  ASSESSMENT/PLAN:     1. 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 Last admission had elevated BUN Cr so PD regiment was changed to daily   -- PD  with 1.5% 2L dwell  4 Cycles over 8 hr got better    -- was on  lasix 80 PO BID  at discharge supposed to  follow up with Dr Borrero in clinic and her PD nurse    Now here with near syncope most likely due to BP meds she took Valzartan at home, on my exam had no complains BUN Cr at range making urine but has Urinary retention bladder scan 500+ discussed with patient and family if retention remain need to stay get lauren  if not can be discharged   -- If remains admitted PD tonight with   with 1.5% 2L dwell  3 Cycles over 6 hr    2. HTN   -  controlled   -- Hold Valsartan   -- First thing to resume would be lasix 80 BID   -- Discharge with lasix only rest of BP meds can be adjusted at PD clinic     3. Anemia of chronic kidney disease treated with MIGUEL   EPogen    monthly   Recent Labs   Lab 03/08/24  3152  03/12/24  1329 03/13/24  0318   HGB 7.6* 6.7* 8.0*   HCT 24.0* 21.6* 25.3*    160 189         Iron   Lab Results   Component Value Date    IRON 124 05/09/2023    TIBC 355 05/09/2023    FERRITIN 639 (H) 05/19/2023       4. MBD  - Hypocalcemia   --  PTH vit D   -- control phos    -- Tums 1000 BID for now as her Hypocalcemia should get better   Recent Labs   Lab 03/13/24  0318   CALCIUM 6.8*   PHOS 7.3*     -- Sevelamer 1600 TIDWM   Recent Labs   Lab 03/07/24  0449 03/11/24  0917 03/12/24  1329   MG 1.8 1.8 1.7         Lab Results   Component Value Date    .6 (H) 03/04/2024    CALCIUM 6.8 (LL) 03/13/2024    CAION 0.77 (L) 03/04/2024    PHOS 7.3 (H) 03/13/2024     Lab Results   Component Value Date    [id number] 23 (L) 03/01/2024     Acidosis   -- PO Bicarbonate 650 TID     Lab Results   Component Value Date    CO2 27 03/13/2024       5. Nutrition/Hypoalbuminemia    Recent Labs   Lab 03/08/24  0247 03/12/24  1329   ALBUMIN 2.0* 1.8*       Nepro with meals TID.       Thank you for allowing me to participate in care of your patient  With any question please call   Anna Fairbanks MD     [facility]  ESTRELLA Morocho MD,   OMD TAMIKA Saldana MD E. V. Harmon, NP  [address]   [place], [address] [phone]  After hours answering service: [phone]

## 2024-03-13 NOTE — PLAN OF CARE
SW met with Pt at bedside. Pt son-n-law Juan at bedside as well. Pt primarily independent with ADL's but requires some assistance. Pt has a supportive family and family will transport Pt home. Pt a PD dialysis Pt. No other D/C needs identified from CM. SW to assist with any future needs.     F/U appts scheduled.     Future Appointments   Date Time Provider Department Center   3/15/2024 10:00 AM Ev Jiménez MD [facility] IMPRI [facility]   4/10/2024  3:30 PM Kelvin Maki MD [facility] PULMSVC [facility]   4/17/2024  9:00 AM Eric Brooke PA-C [facility] UROGYN [facility]   4/24/2024 10:40 AM Dayton Michael MD [facility] FAM MED [facility]   6/5/2024 12:15 PM EKG, APPT [facility] EKG [facility]   6/5/2024 12:40 PM COORDINATED DEVICE CHECK [facility] ARRHPRO [facility]   6/5/2024  1:30 PM Celeste Rogers, NP [facility] ARRHYTH [facility] - Telemetry  Discharge Assessment    Primary Care Provider: Dayton Michael MD     Discharge Assessment (most recent)       BRIEF DISCHARGE ASSESSMENT - 03/13/24 1022          Discharge Planning    Assessment Type Discharge Planning Brief Assessment (P)      Resource/Environmental Concerns none (P)      Support Systems Children (P)      Equipment Currently Used at Home CPAP;oxygen (P)      Current Living Arrangements home (P)      Patient/Family Anticipates Transition to home;home with family (P)      Patient/Family Anticipated Services at Transition none (P)      DME Needed Upon Discharge  none (P)      Discharge Plan A Home;Home with family (P)                    Mitali Talley LMSW  Phone: [phone]  [facility]

## 2024-03-13 NOTE — PLAN OF CARE
Problem: Physical Therapy  Goal: Physical Therapy Goal  Description: Goals to be met by: 24     Patient will increase functional independence with mobility by performin. Supine to sit with Modified [place]  2. Sit to supine with Modified [place]  3. Sit to stand transfer with Modified [place] with use of RW.  4. Bed to chair transfer with Modified [place] using Rolling Walker  5. Gait  x 150 feet with Modified [place] using Rolling Walker.     Outcome: Ongoing, Progressing    PT evaluation completed. Pt at this time CGA with use of RW with mobility. PT recommending low intensity therapy and 24/7 family assistance (per son present plan is for pt to d/c home with him and wife and she will have 24/7 assistance). Therapy will continue to progress pt as able.

## 2024-03-13 NOTE — PT/OT/SLP EVAL
Physical Therapy Evaluation and Treatment    Patient Name:  Myesha Quinones   MRN:  [id number]    Recommendations:     Discharge Recommendations: Low Intensity Therapy (with 24/7 family assistance)   Discharge Equipment Recommendations: bedside commode   Barriers to discharge:  limited functional endurance    Assessment:     Myesha Quinones is a 83 y.o. female admitted with a medical diagnosis of Syncope.  She presents with the following impairments/functional limitations: weakness, impaired endurance, impaired functional mobility, gait instability, impaired balance, decreased lower extremity function, impaired cardiopulmonary response to activity.    PT evaluation completed. Pt at this time CGA with use of RW with mobility. PT recommending low intensity therapy and 24/7 family assistance (per son present plan is for pt to d/c home with him and wife and she will have 24/7 assistance). Therapy will continue to progress pt as able.        Rehab Prognosis: Good; patient would benefit from acute skilled PT services to address these deficits and reach maximum level of function.    Recent Surgery: * No surgery found *      Plan:     During this hospitalization, patient to be seen 3 x/week to address the identified rehab impairments via gait training, therapeutic activities, therapeutic exercises, neuromuscular re-education and progress toward the following goals:    Plan of Care Expires:  04/13/24    Subjective     Chief Complaint: mild dizziness upon standing  Patient/Family Comments/goals: to return home  Pain/Comfort:  Pain Rating 1: 0/10  Pain Rating Post-Intervention 1: 0/10    Patients cultural, spiritual, Bahai conflicts given the current situation: no    Living Environment:  Lives alone in 1 story home with no steps to enter. WIS with SC. (Plan post d/c is to go with son and his wife in 1 story home with no steps; WIS and SC - with 24/7 assistance available)  Prior to admission, patients level of  function was MOD I with rollator.  Equipment used at home: rollator, wheelchair, shower chair, cane, straight, oxygen.  DME owned (not currently used): none.  Upon discharge, patient will have 24/7 assistance from family.    Objective:     Communicated with Nurse prior to session.  Patient found seated on commode in room with telemetry, oxygen  upon PT entry to room.    General Precautions: Standard, fall, hearing impaired  Orthopedic Precautions:N/A   Braces: N/A  Respiratory Status:  was on RA upon PT entry into room; SpO2 84-85%; placed back on 1L O2 and recovers to 97%- nsg notified.     Exams:  Cognitive Exam:  Patient is oriented to Person, Place, Time, and Situation  Sensation:    -       Intact  light/touch to BLE  RLE ROM: WFL  RLE Strength: WFL  LLE ROM: WFL  LLE Strength: WFL    Functional Mobility:  Transfers:     Sit to Stand:  contact guard assistance with rolling walker  Bed to Chair: contact guard assistance with  rolling walker  using  Step Transfer  Gait: ~40ft around bed in room with use of RW and CGA      AM-PAC 6 CLICK MOBILITY  Total Score:17       Treatment & Education:  Pt educated on role of PT.   Pt initially on RA upon therapy entry into room (84-85% SpO2)- pt's high flow nasal cannula positioned on bed set on 1L; pt set up by therapy with regular oxygen line with extension and able to recover to 97% on 1L (nsg notified).   Pt has some dizziness upon standing during session; however /62.  Dizziness improves post mobility with rest break in sitting.   Plan is for pt to move in with family for time being (they can provide 24/7 assistance).  Family present educated to alert [facility] staff if they are to leave pt alone in room.   Pt and family understanding of all education provided.     Patient left up in chair with all lines intact, call button in reach, Nurse notified, and family present.    GOALS:   Multidisciplinary Problems       Physical Therapy Goals          Problem: Physical Therapy     Goal Priority Disciplines Outcome Goal Variances Interventions   Physical Therapy Goal     PT, PT/OT Ongoing, Progressing     Description: Goals to be met by: 24     Patient will increase functional independence with mobility by performin. Supine to sit with Modified [place]  2. Sit to supine with Modified [place]  3. Sit to stand transfer with Modified [place] with use of RW.  4. Bed to chair transfer with Modified [place] using Rolling Walker  5. Gait  x 150 feet with Modified [place] using Rolling Walker.                          History:     Past Medical History:   Diagnosis Date    Acute encephalopathy 2024    Acute hypoxemic respiratory failure 2019    Acute right-sided thoracic back pain 2019    Allergy     Asthma     Basal cell carcinoma     left forehead    Basal cell carcinoma     left nose    Basal cell carcinoma 2015    right nose    Basal cell carcinoma 2015    left lower post neck    Basal cell carcinoma 2019    left ant scalp     BCC (basal cell carcinoma of skin) 10/20/2022    Right alar groove    Bilateral pleural effusion     Bilateral renal cysts     Breast cancer     CAD (coronary artery disease)     Cardiomyopathy     Cardiomyopathy, ischemic     Cataract     Chest pain 2024    CHF (congestive heart failure)     CKD (chronic kidney disease) stage 4, GFR 15-29 ml/min     Colon polyp     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, with long-term current use of insulin 2018    COPD (chronic obstructive pulmonary disease)     COPD exacerbation 2018    Current mild episode of major depressive disorder without prior episode 2022    Defibrillator discharge     Dependence on renal dialysis 2023    Diabetes mellitus     Diabetes mellitus type II     Diabetes with neurologic complications     Edema     ESRD needing dialysis      Goals of care, counseling/discussion 07/08/2022    Goiter     MNG    Hematuria, unspecified     HX: breast cancer     Hyperlipidemia     Hypertension     Hypocalcemia     Hypokalemia     Hyponatremia     Hyponatremia 04/02/2022    Iron deficiency anemia 05/16/2017    Iron deficiency anemia     Iron deficiency anemia     Left kidney mass     Meningioma     Microalbuminuria due to type 2 diabetes mellitus 01/26/2022    Osteoporosis, postmenopausal     Pneumonia 12/08/2019    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    Proteinuria 01/21/2019    Pseudomonas pneumonia     SCC (squamous cell carcinoma) 08/25/2022    right posterior neck    Skin cancer     s/p excision    Sleep apnea     CPAP    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/2015    mid forehead    Unspecified vitamin D deficiency     UTI (urinary tract infection) 04/02/2022    Ventricular tachycardia     Vitamin B12 deficiency     Vitamin D deficiency disease        Past Surgical History:   Procedure Laterality Date    BASAL CELL CARCINOMA EXCISION      posterior neck and nose    BREAST BIOPSY      BREAST CYST EXCISION Left     BREAST SURGERY      CARDIAC DEFIBRILLATOR PLACEMENT      x 2    CATARACT EXTRACTION W/  INTRAOCULAR LENS IMPLANT Bilateral     CHOLECYSTECTOMY      COLONOSCOPY N/A 11/5/2019    Procedure: COLONOSCOPY;  Surgeon: Boaz Botello MD;  Location: [address]);  Service: Endoscopy;  Laterality: N/A;  [facility] -     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    FRACTURE SURGERY      left elbow and wrist as a child    HYSTERECTOMY      INSERTION, CATHETER, DIALYSIS, PERITONEAL, LAPAROSCOPIC N/A 4/25/2023    Procedure: INSERTION, CATHETER, DIALYSIS, PERITONEAL, LAPAROSCOPIC;  Surgeon: Marcelo Isaac MD;  Location: [facility] OR;  Service: General;  Laterality: N/A;    LAPAROSCOPIC LYSIS OF ADHESIONS N/A 4/25/2023    Procedure: LYSIS, ADHESIONS, LAPAROSCOPIC;  Surgeon: Marcelo Isaac MD;  Location: [facility] OR;  Service: General;   Laterality: N/A;    MASTECTOMY Right     OMENTOPEXY, LAPAROSCOPIC N/A 4/25/2023    Procedure: OMENTOPEXY, LAPAROSCOPIC;  Surgeon: Marcelo Isaac MD;  Location: [facility];  Service: General;  Laterality: N/A;    REPLACEMENT OF IMPLANTABLE CARDIOVERTER-DEFIBRILLATOR (ICD) GENERATOR N/A 12/17/2018    Procedure: REPLACEMENT, ICD GENERATOR;  Surgeon: Jan Mckeon MD;  Location: [facility] EP LAB;  Service: Cardiology;  Laterality: N/A;  DONNA, CRT-D gen sulema, MDT, MAC, SK, 3 Prep    REVISION OF SKIN POCKET FOR CARDIOVERTER-DEFIBRILLATOR  12/17/2018    Procedure: Revision, Skin Pocket, For Cardioverter-Defibrillator;  Surgeon: Jan Mckeon MD;  Location: [facility] EP LAB;  Service: Cardiology;;    SQUAMOUS CELL CARCINOMA EXCISION      remved from forehead    TONSILLECTOMY         Time Tracking:     PT Received On: 03/13/24  PT Start Time: 1100     PT Stop Time: 1133  PT Total Time (min): 33 min     Billable Minutes: Evaluation 10, Gait Training 10, and Therapeutic Activity 13      03/13/2024

## 2024-03-13 NOTE — SUBJECTIVE & OBJECTIVE
Past Medical History:   Diagnosis Date    Acute encephalopathy 02/29/2024    Acute hypoxemic respiratory failure 12/19/2019    Acute right-sided thoracic back pain 12/09/2019    Allergy     Asthma     Basal cell carcinoma     left forehead    Basal cell carcinoma     left nose    Basal cell carcinoma 05/20/2015    right nose    Basal cell carcinoma 12/22/2015    left lower post neck    Basal cell carcinoma 12/03/2019    left ant scalp     BCC (basal cell carcinoma of skin) 10/20/2022    Right alar groove    Bilateral pleural effusion     Bilateral renal cysts     Breast cancer     CAD (coronary artery disease)     Cardiomyopathy     Cardiomyopathy, ischemic     Cataract     Chest pain 03/04/2024    CHF (congestive heart failure)     CKD (chronic kidney disease) stage 4, GFR 15-29 ml/min     Colon polyp 2011    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, with long-term current use of insulin 02/22/2018    COPD (chronic obstructive pulmonary disease)     COPD exacerbation 04/08/2018    Current mild episode of major depressive disorder without prior episode 01/25/2022    Defibrillator discharge     Dependence on renal dialysis 08/22/2023    Diabetes mellitus     Diabetes mellitus type II     Diabetes with neurologic complications     Edema     ESRD needing dialysis     Goals of care, counseling/discussion 07/08/2022    Goiter     MNG    Hematuria, unspecified     HX: breast cancer     Hyperlipidemia     Hypertension     Hypocalcemia     Hypokalemia     Hyponatremia     Hyponatremia 04/02/2022    Iron deficiency anemia 05/16/2017    Iron deficiency anemia     Iron deficiency anemia     Left kidney mass     Meningioma     Microalbuminuria due to type 2 diabetes mellitus 01/26/2022    Osteoporosis, postmenopausal     Pneumonia 12/08/2019    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    Proteinuria 01/21/2019    Pseudomonas pneumonia     SCC  (squamous cell carcinoma) 08/25/2022    right posterior neck    Skin cancer     s/p excision    Sleep apnea     CPAP    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/2015    mid forehead    Unspecified vitamin D deficiency     UTI (urinary tract infection) 04/02/2022    Ventricular tachycardia     Vitamin B12 deficiency     Vitamin D deficiency disease        Past Surgical History:   Procedure Laterality Date    BASAL CELL CARCINOMA EXCISION      posterior neck and nose    BREAST BIOPSY      BREAST CYST EXCISION Left     BREAST SURGERY      CARDIAC DEFIBRILLATOR PLACEMENT      x 2    CATARACT EXTRACTION W/  INTRAOCULAR LENS IMPLANT Bilateral     CHOLECYSTECTOMY      COLONOSCOPY N/A 11/5/2019    Procedure: COLONOSCOPY;  Surgeon: Boaz Botello MD;  Location: [facility] ENDO [address]);  Service: Endoscopy;  Laterality: N/A;  AICD - [employer] -     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    FRACTURE SURGERY      left elbow and wrist as a child    HYSTERECTOMY      INSERTION, CATHETER, DIALYSIS, PERITONEAL, LAPAROSCOPIC N/A 4/25/2023    Procedure: INSERTION, CATHETER, DIALYSIS, PERITONEAL, LAPAROSCOPIC;  Surgeon: Marcelo Isaac MD;  Location: [facility] OR;  Service: General;  Laterality: N/A;    LAPAROSCOPIC LYSIS OF ADHESIONS N/A 4/25/2023    Procedure: LYSIS, ADHESIONS, LAPAROSCOPIC;  Surgeon: Marcelo Isaac MD;  Location: [facility] OR;  Service: General;  Laterality: N/A;    MASTECTOMY Right     OMENTOPEXY, LAPAROSCOPIC N/A 4/25/2023    Procedure: OMENTOPEXY, LAPAROSCOPIC;  Surgeon: Marcelo Isaac MD;  Location: [facility] OR;  Service: General;  Laterality: N/A;    REPLACEMENT OF IMPLANTABLE CARDIOVERTER-DEFIBRILLATOR (ICD) GENERATOR N/A 12/17/2018    Procedure: REPLACEMENT, ICD GENERATOR;  Surgeon: Jan Mckeon MD;  Location: [facility] EP LAB;  Service: Cardiology;  Laterality: N/A;  DONNA, CRT-D gen sulema, MDT, MAC, SK, 3 Prep    REVISION OF SKIN POCKET FOR CARDIOVERTER-DEFIBRILLATOR  12/17/2018    Procedure: Revision, Skin Pocket,  For Cardioverter-Defibrillator;  Surgeon: Jan Mckeon MD;  Location: [facility] EP LAB;  Service: Cardiology;;    SQUAMOUS CELL CARCINOMA EXCISION      remved from forehead    TONSILLECTOMY         Review of patient's allergies indicates:   Allergen Reactions    Iodine and iodide containing products Hives and Other (See Comments)     Not specified     Nifedipine      weakness       Family History       Problem Relation (Age of Onset)    Asthma Mother    Cancer Brother, Daughter, Son    Cardiomyopathy Father    Cerebral aneurysm Brother    Diabetes Father, Sister, Brother, Brother, Brother, Brother, Daughter, Son, Son    Heart attack Sister, Brother    Heart disease Sister, Brother, Brother, Brother    Hypertension Mother, Brother, Brother    Melanoma Daughter    No Known Problems Maternal Grandmother, Maternal Grandfather, Paternal Grandmother, Paternal Grandfather    Obesity Daughter    Stroke Mother            Tobacco Use    Smoking status: Never     Passive exposure: Never    Smokeless tobacco: Never   Substance and Sexual Activity    Alcohol use: No     Alcohol/week: 0.0 standard drinks of alcohol    Drug use: No    Sexual activity: Yes     Partners: Male       Review of Systems   Constitutional:  Negative for activity change, appetite change, chills, fever and unexpected weight change.   Respiratory:  Negative for shortness of breath.    Cardiovascular:  Negative for chest pain.   Gastrointestinal:  Negative for abdominal pain, constipation, diarrhea, nausea and vomiting.   Genitourinary:  Positive for difficulty urinating. Negative for dysuria, flank pain and hematuria.   Musculoskeletal:  Negative for back pain.   Skin:  Negative for wound.   Neurological:  Negative for headaches.   Psychiatric/Behavioral:  Negative for confusion.        Objective:     Temp:  [97.7 °F (36.5 °C)-98.2 °F (36.8 °C)] 98.2 °F (36.8 °C)  Pulse:  [49-74] 59  Resp:  [8-18] 18  SpO2:  [90 %-100 %] 99 %  BP: (123-157)/(55-75)  157/69  Weight: 58.7 kg (129 lb 6.6 oz)  Body mass index is 22.92 kg/m².    Date 03/13/24 0700 - 03/14/24 0659   Shift 9402-1429 2467-0110 1862-4754 24 Hour Total   INTAKE   Shift Total(mL/kg)       OUTPUT   Urine(mL/kg/hr) 950(2)   950   Stool 1   1   Shift Total(mL/kg) 951(16.2)   951(16.2)   Weight (kg) 58.7 58.7 58.7 58.7     Bladder Scan Volume (mL): 500 mL (03/13/24 1100)    Drains       None                    Physical Exam  Vitals reviewed.   Constitutional:       General: She is not in acute distress.     Appearance: She is not diaphoretic.   HENT:      Head: Normocephalic and atraumatic.      Nose: Nose normal.   Eyes:      Conjunctiva/sclera: Conjunctivae normal.   Cardiovascular:      Rate and Rhythm: Normal rate.   Pulmonary:      Effort: Pulmonary effort is normal. No respiratory distress.   Abdominal:      General: There is no distension.      Palpations: Abdomen is soft.      Tenderness: There is no abdominal tenderness. There is no right CVA tenderness, left CVA tenderness, guarding or rebound.      Comments: PD catheter in place   Genitourinary:     Comments: Diaper and pure wick in place  Musculoskeletal:         General: Normal range of motion.      Cervical back: Normal range of motion.   Skin:     General: Skin is dry.   Neurological:      Mental Status: She is alert.   Psychiatric:         Behavior: Behavior normal.         Thought Content: Thought content normal.          Significant Labs:    BMP:  Recent Labs   Lab 03/09/24  0247 03/11/24  0917 03/12/24 1329 03/13/24  0318     --  139 139   K 3.2* 3.8 3.8 3.8   CL 94*  --  93* 94*   CO2 22*  --  28 27   BUN 52*  --  49* 54*   CREATININE 5.2*  --  4.8* 5.2*   CALCIUM 7.0*  --  6.8* 6.8*       CBC:  Recent Labs   Lab 03/08/24  0247 03/12/24  1329 03/13/24  0318   WBC 13.19* 12.12 12.27   HGB 7.6* 6.7* 8.0*   HCT 24.0* 21.6* 25.3*    160 189       All pertinent labs results from the past 24 hours have been reviewed.    Significant  Imaging:  All pertinent imaging results/findings from the past 24 hours have been reviewed.

## 2024-03-14 VITALS
DIASTOLIC BLOOD PRESSURE: 67 MMHG | HEART RATE: 59 BPM | RESPIRATION RATE: 18 BRPM | OXYGEN SATURATION: 94 % | WEIGHT: 128.75 LBS | TEMPERATURE: 98 F | BODY MASS INDEX: 22.81 KG/M2 | SYSTOLIC BLOOD PRESSURE: 151 MMHG

## 2024-03-14 LAB
ALBUMIN SERPL BCP-MCNC: 1.9 G/DL (ref 3.5–5.2)
ANION GAP SERPL CALC-SCNC: 16 MMOL/L (ref 8–16)
BACTERIA #/AREA URNS HPF: ABNORMAL /HPF
BASOPHILS # BLD AUTO: 0.02 K/UL (ref 0–0.2)
BASOPHILS NFR BLD: 0.2 % (ref 0–1.9)
BILIRUB UR QL STRIP: NEGATIVE
BLD PROD TYP BPU: NORMAL
BLD PROD TYP BPU: NORMAL
BLOOD UNIT EXPIRATION DATE: NORMAL
BLOOD UNIT EXPIRATION DATE: NORMAL
BLOOD UNIT TYPE CODE: 6200
BLOOD UNIT TYPE CODE: 6200
BLOOD UNIT TYPE: NORMAL
BLOOD UNIT TYPE: NORMAL
BUN SERPL-MCNC: 57 MG/DL (ref 8–23)
CALCIUM SERPL-MCNC: 6.7 MG/DL (ref 8.7–10.5)
CHLORIDE SERPL-SCNC: 93 MMOL/L (ref 95–110)
CLARITY UR: ABNORMAL
CO2 SERPL-SCNC: 28 MMOL/L (ref 23–29)
CODING SYSTEM: NORMAL
CODING SYSTEM: NORMAL
COLOR UR: YELLOW
CREAT SERPL-MCNC: 5.1 MG/DL (ref 0.5–1.4)
CROSSMATCH INTERPRETATION: NORMAL
CROSSMATCH INTERPRETATION: NORMAL
DIFFERENTIAL METHOD BLD: ABNORMAL
DISPENSE STATUS: NORMAL
DISPENSE STATUS: NORMAL
EOSINOPHIL # BLD AUTO: 0.1 K/UL (ref 0–0.5)
EOSINOPHIL NFR BLD: 0.8 % (ref 0–8)
ERYTHROCYTE [DISTWIDTH] IN BLOOD BY AUTOMATED COUNT: 14.7 % (ref 11.5–14.5)
EST. GFR  (NO RACE VARIABLE): 8 ML/MIN/1.73 M^2
GLUCOSE SERPL-MCNC: 151 MG/DL (ref 70–110)
GLUCOSE UR QL STRIP: NEGATIVE
HCT VFR BLD AUTO: 24.5 % (ref 37–48.5)
HGB BLD-MCNC: 7.5 G/DL (ref 12–16)
HGB UR QL STRIP: ABNORMAL
HYALINE CASTS #/AREA URNS LPF: 0 /LPF
IMM GRANULOCYTES # BLD AUTO: 0.24 K/UL (ref 0–0.04)
IMM GRANULOCYTES NFR BLD AUTO: 2 % (ref 0–0.5)
KETONES UR QL STRIP: NEGATIVE
LEUKOCYTE ESTERASE UR QL STRIP: ABNORMAL
LYMPHOCYTES # BLD AUTO: 1.4 K/UL (ref 1–4.8)
LYMPHOCYTES NFR BLD: 11.5 % (ref 18–48)
MCH RBC QN AUTO: 30.4 PG (ref 27–31)
MCHC RBC AUTO-ENTMCNC: 30.6 G/DL (ref 32–36)
MCV RBC AUTO: 99 FL (ref 82–98)
MICROSCOPIC COMMENT: ABNORMAL
MONOCYTES # BLD AUTO: 0.8 K/UL (ref 0.3–1)
MONOCYTES NFR BLD: 6.5 % (ref 4–15)
NEUTROPHILS # BLD AUTO: 9.4 K/UL (ref 1.8–7.7)
NEUTROPHILS NFR BLD: 79 % (ref 38–73)
NITRITE UR QL STRIP: NEGATIVE
NRBC BLD-RTO: 0 /100 WBC
PH UR STRIP: 6 [PH] (ref 5–8)
PHOSPHATE SERPL-MCNC: 6.3 MG/DL (ref 2.7–4.5)
PLATELET # BLD AUTO: 192 K/UL (ref 150–450)
PMV BLD AUTO: 10.7 FL (ref 9.2–12.9)
POCT GLUCOSE: 152 MG/DL (ref 70–110)
POCT GLUCOSE: 241 MG/DL (ref 70–110)
POTASSIUM SERPL-SCNC: 3.5 MMOL/L (ref 3.5–5.1)
PROT UR QL STRIP: ABNORMAL
RBC # BLD AUTO: 2.47 M/UL (ref 4–5.4)
RBC #/AREA URNS HPF: 2 /HPF (ref 0–4)
SODIUM SERPL-SCNC: 137 MMOL/L (ref 136–145)
SP GR UR STRIP: 1.01 (ref 1–1.03)
SQUAMOUS #/AREA URNS HPF: 1 /HPF
TRANS ERYTHROCYTES VOL PATIENT: NORMAL ML
TRANS ERYTHROCYTES VOL PATIENT: NORMAL ML
URN SPEC COLLECT METH UR: ABNORMAL
UROBILINOGEN UR STRIP-ACNC: NEGATIVE EU/DL
WBC # BLD AUTO: 11.95 K/UL (ref 3.9–12.7)
WBC #/AREA URNS HPF: >100 /HPF (ref 0–5)
WBC CLUMPS URNS QL MICRO: ABNORMAL
YEAST URNS QL MICRO: ABNORMAL

## 2024-03-14 PROCEDURE — 81000 URINALYSIS NONAUTO W/SCOPE: CPT | Performed by: FAMILY MEDICINE

## 2024-03-14 PROCEDURE — G0378 HOSPITAL OBSERVATION PER HR: HCPCS

## 2024-03-14 PROCEDURE — 36415 COLL VENOUS BLD VENIPUNCTURE: CPT | Performed by: STUDENT IN AN ORGANIZED HEALTH CARE EDUCATION/TRAINING PROGRAM

## 2024-03-14 PROCEDURE — 87106 FUNGI IDENTIFICATION YEAST: CPT | Performed by: FAMILY MEDICINE

## 2024-03-14 PROCEDURE — 25000003 PHARM REV CODE 250: Performed by: STUDENT IN AN ORGANIZED HEALTH CARE EDUCATION/TRAINING PROGRAM

## 2024-03-14 PROCEDURE — 87088 URINE BACTERIA CULTURE: CPT | Performed by: FAMILY MEDICINE

## 2024-03-14 PROCEDURE — 87086 URINE CULTURE/COLONY COUNT: CPT | Performed by: FAMILY MEDICINE

## 2024-03-14 PROCEDURE — 80069 RENAL FUNCTION PANEL: CPT | Performed by: STUDENT IN AN ORGANIZED HEALTH CARE EDUCATION/TRAINING PROGRAM

## 2024-03-14 PROCEDURE — 51701 INSERT BLADDER CATHETER: CPT

## 2024-03-14 PROCEDURE — 25000003 PHARM REV CODE 250: Performed by: FAMILY MEDICINE

## 2024-03-14 PROCEDURE — 51798 US URINE CAPACITY MEASURE: CPT

## 2024-03-14 PROCEDURE — 94761 N-INVAS EAR/PLS OXIMETRY MLT: CPT

## 2024-03-14 PROCEDURE — 99213 OFFICE O/P EST LOW 20 MIN: CPT | Mod: ,,, | Performed by: UROLOGY

## 2024-03-14 PROCEDURE — 97116 GAIT TRAINING THERAPY: CPT | Mod: CQ

## 2024-03-14 PROCEDURE — 97110 THERAPEUTIC EXERCISES: CPT | Mod: CQ

## 2024-03-14 PROCEDURE — 85025 COMPLETE CBC W/AUTO DIFF WBC: CPT | Performed by: FAMILY MEDICINE

## 2024-03-14 PROCEDURE — 99900035 HC TECH TIME PER 15 MIN (STAT)

## 2024-03-14 RX ORDER — CALCIUM CARBONATE 200(500)MG
1000 TABLET,CHEWABLE ORAL 3 TIMES DAILY
Status: DISCONTINUED | OUTPATIENT
Start: 2024-03-14 | End: 2024-03-14 | Stop reason: HOSPADM

## 2024-03-14 RX ADMIN — INSULIN ASPART 4 UNITS: 100 INJECTION, SOLUTION INTRAVENOUS; SUBCUTANEOUS at 12:03

## 2024-03-14 RX ADMIN — TAMSULOSIN HYDROCHLORIDE 0.4 MG: 0.4 CAPSULE ORAL at 08:03

## 2024-03-14 RX ADMIN — POLYETHYLENE GLYCOL 3350 17 G: 17 POWDER, FOR SOLUTION ORAL at 08:03

## 2024-03-14 RX ADMIN — INSULIN ASPART 2 UNITS: 100 INJECTION, SOLUTION INTRAVENOUS; SUBCUTANEOUS at 06:03

## 2024-03-14 RX ADMIN — CALCIUM CARBONATE (ANTACID) CHEW TAB 500 MG 1000 MG: 500 CHEW TAB at 02:03

## 2024-03-14 RX ADMIN — PRAVASTATIN SODIUM 40 MG: 40 TABLET ORAL at 08:03

## 2024-03-14 RX ADMIN — CHOLECALCIFEROL TAB 25 MCG (1000 UNIT) 2000 UNITS: 25 TAB at 08:03

## 2024-03-14 RX ADMIN — SODIUM BICARBONATE 650 MG: 650 TABLET ORAL at 08:03

## 2024-03-14 RX ADMIN — CALCIUM CARBONATE (ANTACID) CHEW TAB 500 MG 1000 MG: 500 CHEW TAB at 08:03

## 2024-03-14 RX ADMIN — ASPIRIN 81 MG CHEWABLE TABLET 81 MG: 81 TABLET CHEWABLE at 08:03

## 2024-03-14 RX ADMIN — CALCITRIOL CAPSULES 0.25 MCG 0.5 MCG: 0.25 CAPSULE ORAL at 08:03

## 2024-03-14 RX ADMIN — FUROSEMIDE 80 MG: 40 TABLET ORAL at 12:03

## 2024-03-14 RX ADMIN — SEVELAMER CARBONATE 1600 MG: 800 TABLET, FILM COATED ORAL at 12:03

## 2024-03-14 RX ADMIN — SODIUM BICARBONATE 650 MG: 650 TABLET ORAL at 12:03

## 2024-03-14 RX ADMIN — Medication 100 MCG: at 08:03

## 2024-03-14 RX ADMIN — SEVELAMER CARBONATE 1600 MG: 800 TABLET, FILM COATED ORAL at 08:03

## 2024-03-14 NOTE — PT/OT/SLP PROGRESS
Physical Therapy Treatment    Patient Name:  Myesha Quinones   MRN:  [id number]    Recommendations:     Discharge Recommendations: Low Intensity Therapy (with 24/7 family assistance)  Discharge Equipment Recommendations: bedside commode  Barriers to discharge: None    Assessment:     Myesha Quinones is a 83 y.o. female admitted with a medical diagnosis of Syncope.  She presents with the following impairments/functional limitations: weakness, impaired endurance, impaired functional mobility, gait instability, impaired balance, decreased lower extremity function, impaired cardiopulmonary response to activity.    Pt ambulated ~65ft using RW and SBA with brief moments of CGA 2* safety awareness.    Rehab Prognosis: Good; patient would benefit from acute skilled PT services to address these deficits and reach maximum level of function.    Recent Surgery: * No surgery found *      Plan:     During this hospitalization, patient to be seen 3 x/week to address the identified rehab impairments via gait training, therapeutic activities, therapeutic exercises, neuromuscular re-education and progress toward the following goals:    Plan of Care Expires:  04/13/24    Subjective     Chief Complaint: None at this time  Patient/Family Comments/goals: Pt agreed to therapy  Pain/Comfort:  Pain Rating 1: 0/10      Objective:     Communicated with nursing prior to session.  Patient found HOB elevated with bed alarm, peripheral IV, telemetry upon PT entry to room.     General Precautions: Standard, fall, hearing impaired  Orthopedic Precautions: N/A  Braces: N/A  Respiratory Status: Room air     Functional Mobility: Following ambulation trial, pt spO2 88%, pt instructed to perform PLB, pt spO2 recovered to 96%, nursing made aware.  Bed Mobility:     Rolling Left:  stand by assistance  Scooting: stand by assistance  Supine to Sit: stand by assistance  Transfers: Gait belt donned; x1 trial from EOB  Sit to Stand:  contact guard  assistance with rolling walker  Gait: Pt ambulated ~65ft using RW and SBA with brief moments of CGA 2* safety awareness. Pt with decreased step length, sandra, decreased endurance. Pt required verbal cueing for safety awareness and RW management. Pt with few brief standing rest breaks 2* dizziness during ambulation.   Balance: Pt with Fair standing balance      AM-PAC 6 CLICK MOBILITY  Turning over in bed (including adjusting bedclothes, sheets and blankets)?: 4  Sitting down on and standing up from a chair with arms (e.g., wheelchair, bedside commode, etc.): 3  Moving from lying on back to sitting on the side of the bed?: 3  Moving to and from a bed to a chair (including a wheelchair)?: 3  Need to walk in hospital room?: 3  Climbing 3-5 steps with a railing?: 2  Basic Mobility Total Score: 18       Treatment & Education:  Pt instructed to perform sitting EOB LAQ, HR, hip flexion, hip abd/add 1x15 each.     Pt educated on PLB technique to promote relaxation and decrease dizziness.     Patient left up in chair with all lines intact, call button in reach, nursing notified, and family present.    GOALS:   Multidisciplinary Problems       Physical Therapy Goals          Problem: Physical Therapy    Goal Priority Disciplines Outcome Goal Variances Interventions   Physical Therapy Goal     PT, PT/OT Ongoing, Progressing     Description: Goals to be met by: 24     Patient will increase functional independence with mobility by performin. Supine to sit with Modified Grant  2. Sit to supine with Modified Grant  3. Sit to stand transfer with Modified Grant with use of RW.  4. Bed to chair transfer with Modified Grant using Rolling Walker  5. Gait  x 150 feet with Modified Grant using Rolling Walker.                          Time Tracking:     PT Received On: 24  PT Start Time: 1119     PT Stop Time: 1144  PT Total Time (min): 25 min     Billable Minutes: Gait Training 15  and Therapeutic Exercise 10    Treatment Type: Treatment  PT/PTA: PTA     Number of PTA visits since last PT visit: 1 03/14/2024

## 2024-03-14 NOTE — ASSESSMENT & PLAN NOTE
Creatine stable for now. BMP reviewed- noted Estimated Creatinine Clearance: 6.8 mL/min (A) (based on SCr of 5.1 mg/dL (H)). according to latest data. 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  Monitor UOP and serial BMP and adjust therapy as needed. Renally dose meds. Avoid nephrotoxic medications and procedures.

## 2024-03-14 NOTE — PLAN OF CARE
[facility] - Telemetry      HOME HEALTH ORDERS  FACE TO FACE ENCOUNTER    Patient Name: Myesha Quinones  YOB: 1940    PCP: Dayton Michael MD   PCP Address: [address] / [place] [address]  PCP Phone Number: [phone]  PCP Fax: [phone]    Encounter Date: 3/12/24    Admit to Home Health    Diagnoses:  Active Hospital Problems    Diagnosis  POA    *Syncope [R55]  Yes    ESRD on peritoneal dialysis [N18.6, Z99.2]  Not Applicable    Urinary retention [R33.9]  Yes     80 yo F with urinary retention.  Lauren placed 2/28/22.   --recurrent retention, lauren replaced 4/1/22  --Pessary placed per urogyn and lauren removed  --admitted 6/2022 and incomplete emptying despite pessary      Community acquired pneumonia [J18.9]  Yes    Acute on chronic combined systolic and diastolic heart failure [I50.43]  Yes     - continue optimization of BP and heart failure as possible.  Since starting PD, this seems to be much better controlled.      Iron deficiency anemia [D50.9]  Yes    Diabetic polyneuropathy associated with type 2 diabetes mellitus [E11.42]  Yes    Type 2 diabetes mellitus with stage 4 chronic kidney disease and hypertension [E11.22, I12.9, N18.4]  Yes      Resolved Hospital Problems   No resolved problems to display.       Follow Up Appointments:  Future Appointments   Date Time Provider Department Center   3/25/2024  9:30 AM Ev Jiménez MD [facility] IMPRI [facility]   4/10/2024  3:30 PM Kelvin Maki MD [facility] PULMSVC [facility]   4/17/2024  9:00 AM Eric Brooke PA-C [facility] UROGYN [facility]   4/24/2024 10:40 AM Dayton Michael MD [facility] MED [facility]   6/5/2024 12:15 PM EKG, APPT [facility] EKG [facility]   6/5/2024 12:40 PM COORDINATED DEVICE CHECK [facility] ARRHPRO [facility]   6/5/2024  1:30 PM Celeste Rogers, NP [facility] ARRHYTH [facility]       Allergies:  Review of patient's allergies indicates:   Allergen Reactions    Iodine and iodide containing products Hives and Other  (See Comments)     Not specified     Nifedipine      weakness       Medications: Review discharge medications with patient and family and provide education.    Current Facility-Administered Medications   Medication Dose Route Frequency Provider Last Rate Last Admin    0.9%  NaCl infusion (for blood administration)   Intravenous Q24H PRN Maureen Gray MD        acetaminophen tablet 650 mg  650 mg Oral Q4H PRN Maureen Gray MD        albuterol-ipratropium 2.5 mg-0.5 mg/3 mL nebulizer solution 3 mL  3 mL Nebulization Q4H PRN Maureen Gray MD        aspirin chewable tablet 81 mg  81 mg Oral Daily Maureen Gray MD   81 mg at 03/14/24 0815    calcitRIOL capsule 0.5 mcg  0.5 mcg Oral Daily Maureen Gray MD   0.5 mcg at 03/14/24 0815    calcium carbonate 200 mg calcium (500 mg) chewable tablet 1,000 mg  1,000 mg Oral BID Anna Fairbanks MD   1,000 mg at 03/14/24 0816    cyanocobalamin tablet 100 mcg  100 mcg Oral Daily Maureen Gray MD   100 mcg at 03/14/24 0815    dextrose 10% bolus 125 mL 125 mL  12.5 g Intravenous PRN Maureen Gray MD        dextrose 10% bolus 250 mL 250 mL  25 g Intravenous PRN Maureen Gray MD        ergocalciferol capsule 50,000 Units  50,000 Units Oral Q7 Days Maureen Gray MD   50,000 Units at 03/13/24 0909    fluticasone propionate 50 mcg/actuation nasal spray 50 mcg  1 spray Each Nostril Daily Maureen Gray MD        furosemide tablet 80 mg  80 mg Oral BID Maureen Gray MD   80 mg at 03/13/24 2112    glucagon (human recombinant) injection 1 mg  1 mg Intramuscular PRN Maureen Gray MD        glucose chewable tablet 16 g  16 g Oral PRN Maureen Gray MD        glucose chewable tablet 24 g  24 g Oral PRN Maureen Gray MD        insulin aspart U-100 pen 1-10 Units  1-10 Units Subcutaneous QID (AC + HS) PRN Maureen Gray MD   2 Units at 03/14/24 0650    levoFLOXacin tablet 500 mg   500 mg Oral Every other day Maureen Gray MD   500 mg at 03/13/24 0909    melatonin tablet 6 mg  6 mg Oral Nightly PRN Maureen Gray MD   6 mg at 03/13/24 2112    naloxone 0.4 mg/mL injection 0.02 mg  0.02 mg Intravenous PRN Maureen Gray MD        ondansetron disintegrating tablet 8 mg  8 mg Oral Q8H PRN Maureen Gray MD        polyethylene glycol packet 17 g  17 g Oral Daily Maureen Gray MD   17 g at 03/14/24 0822    pravastatin tablet 40 mg  40 mg Oral Daily Maureen Gray MD   40 mg at 03/14/24 0815    sevelamer carbonate tablet 1,600 mg  1,600 mg Oral TID Maureen Gray MD   1,600 mg at 03/14/24 0813    sodium bicarbonate tablet 650 mg  650 mg Oral QID Maureen Gray MD   650 mg at 03/14/24 0815    sodium chloride 0.9% flush 10 mL  10 mL Intravenous Q12H PRN Maureen Gray MD        tamsulosin 24 hr capsule 0.4 mg  0.4 mg Oral Daily Maureen Gray MD   0.4 mg at 03/14/24 0815    vitamin D 1000 units tablet 2,000 Units  2,000 Units Oral Daily Maureen Gray MD   2,000 Units at 03/14/24 0815     Current Discharge Medication List        CONTINUE these medications which have NOT CHANGED    Details   acetaminophen (TYLENOL) 500 MG tablet Take 500 mg by mouth as needed.      albuterol (PROVENTIL/VENTOLIN HFA) 90 mcg/actuation inhaler Inhale 2 puffs into the lungs every 6 (six) hours as needed for Wheezing. Rescue  Qty: 18 g, Refills: 12    Associated Diagnoses: Cough      albuterol-ipratropium (DUO-NEB) 2.5 mg-0.5 mg/3 mL nebulizer solution Take 3 mLs by nebulization every 4 (four) hours as needed for Wheezing.  Qty: 270 mL, Refills: 12    Associated Diagnoses: Chronic obstructive pulmonary disease, unspecified COPD type; Cough, unspecified type      aspirin 81 MG Chew Take 81 mg by mouth once daily.      calcitRIOL (ROCALTROL) 0.5 MCG Cap Take 1 capsule (0.5 mcg total) by mouth once daily.  Qty: 30 capsule, Refills: 11      carvediloL  (COREG) 25 MG tablet Take 0.5 tablets (12.5 mg total) by mouth 2 (two) times daily with meals.    Comments: .      cholecalciferol, vitamin D3, (VITAMIN D3) 50 mcg (2,000 unit) Tab Take 1 tablet by mouth once daily.       cyanocobalamin (VITAMIN B-12) 100 MCG tablet Take 100 mcg by mouth once daily.      epoetin [place]-epbx (RETACRIT) 10,000 unit/mL imjection Inject 1 mL (10,000 Units total) into the skin every 30 days.  Qty: 1 mL, Refills: 11      ergocalciferol (ERGOCALCIFEROL) 50,000 unit Cap Take 50,000 Units by mouth every 7 days.      estradioL (ESTRACE) 0.01 % (0.1 mg/gram) vaginal cream PLACE 1 GRAM VAGINALLY EVERY OTHER DAY  Qty: 42.5 g, Refills: 3    Associated Diagnoses: Postmenopause atrophic vaginitis; Urinary retention; Encounter for fitting and adjustment of pessary      estradioL (ESTRING) 2 mg (7.5 mcg /24 hour) vaginal ring Place 2 mg vaginally every 3 (three) months.  Qty: 1 each, Refills: 3    Associated Diagnoses: Postmenopause atrophic vaginitis; Pessary maintenance      fluticasone propionate (FLONASE) 50 mcg/actuation nasal spray 1 spray by Each Nostril route daily as needed.      furosemide (LASIX) 80 MG tablet Take 1 tablet (80 mg total) by mouth 2 (two) times a day.  Qty: 60 tablet, Refills: 3    Associated Diagnoses: Cardiomyopathy, ischemic; Chronic combined systolic (congestive) and diastolic (congestive) heart failure      insulin (LANTUS SOLOSTAR U-100 INSULIN) glargine 100 units/mL SubQ pen Inject 8 Units into the skin every evening.  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 for 20 days  Qty: 20 tablet, Refills: 0      montelukast (SINGULAIR) 10 mg tablet Take 1 tablet (10 mg total) by mouth once daily.  Qty: 90 tablet, Refills: 3    Associated  "Diagnoses: Mild intermittent chronic asthma without complication      nitroGLYCERIN (NITROSTAT) 0.4 MG SL tablet Place 0.4 mg under the tongue every 5 (five) minutes as needed for Chest pain.       omega-3 fatty acids 500 mg Cap Take 1 capsule by mouth Daily.      pravastatin (PRAVACHOL) 40 MG tablet Take 1 tablet (40 mg total) by mouth once daily.  Qty: 90 tablet, Refills: 3    Associated Diagnoses: Chronic combined systolic and diastolic heart failure; Hyperlipidemia associated with type 2 diabetes mellitus; Mixed hyperlipidemia      sevelamer carbonate (RENVELA) 800 mg Tab Take 2 tablets (1,600 mg total) by mouth 3 (three) times daily.  Qty: 180 tablet, Refills: 3    Associated Diagnoses: CKD (chronic kidney disease) stage 4, GFR 15-29 ml/min      sodium bicarbonate 650 MG tablet Take 1 tablet (650 mg total) by mouth 4 (four) times daily.  Qty: 120 tablet, Refills: 3      valsartan (DIOVAN) 320 MG tablet Take 160 mg by mouth once daily. 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  Qty: 400 each, Refills: 3    Associated Diagnoses: Diabetic polyneuropathy associated with type 2 diabetes mellitus               I have seen and examined this patient within the last 30 days. My clinical findings that support the need for the home health skilled services and home bound status " are the following:no   Weakness/numbness causing balance and gait disturbance due to Weakness/Debility making it taxing to leave home.     Diet:   regular diet    Labs:  Report Lab results to PCP.    Referrals/ Consults  Physical Therapy to evaluate and treat. Evaluate for home safety and equipment needs; Establish/upgrade home exercise program. Perform / instruct on therapeutic exercises, gait training, transfer training, and Range of Motion.  Occupational Therapy to evaluate and treat. Evaluate home environment for safety and equipment needs. Perform/Instruct on transfers, ADL training, ROM, and therapeutic exercises.   to evaluate for community resources/long-range planning.  Aide to provide assistance with personal care, ADLs, and vital signs.    Activities:   activity as tolerated    Nursing:   Agency to admit patient within 24 hours of hospital discharge unless specified on physician order or at patient request    SN to complete comprehensive assessment including routine vital signs. Instruct on disease process and s/s of complications to report to MD. Review/verify medication list sent home with the patient at time of discharge  and instruct patient/caregiver as needed. Frequency may be adjusted depending on start of care date.     Skilled nurse to perform up to 3 visits PRN for symptoms related to diagnosis    Notify MD if SBP > 160 or < 90; DBP > 90 or < 50; HR > 120 or < 50; Temp > 101; O2 < 88%; Other:       Ok to schedule additional visits based on staff availability and patient request on consecutive days within the home health episode.    When multiple disciplines ordered:    Start of Care occurs on Sunday - Wednesday schedule remaining discipline evaluations as ordered on separate consecutive days following the start of care.    Thursday SOC -schedule subsequent evaluations Friday and Monday the following week.     Friday - Saturday SOC - schedule subsequent discipline evaluations on  consecutive days starting Monday of the following week.    For all post-discharge communication and subsequent orders please contact patient's primary care physician. If unable to reach primary care physician or do not receive response within 30 minutes, please contact  for clinical staff order clarification    Miscellaneous   [place] Care: Instruct patient/caregiver to empty [place] bag.  Change [place] every month.    Home Health Aide:  Nursing Three times weekly, Physical Therapy Three times weekly, Occupational Therapy Three times weekly, and Home Health Aide Three times weekly    Wound Care Orders  no    I certify that this patient is confined to her home and needs intermittent skilled nursing care, physical therapy, and occupational therapy.

## 2024-03-14 NOTE — DISCHARGE SUMMARY
[facility]  Discharge Summary      Patient Name: Myesha Quinones  MRN: [id number]  ALEXIS: [id number]  Patient Class: OP- Observation  Admission Date: 3/12/2024  Hospital Length of Stay: 0 days  Discharge Date and Time:  03/14/2024 4:10 PM  Attending Physician: Felicia att. providers found   Discharging Provider: Maureen Gray MD  Primary Care Provider: Dayton Michael MD    Primary Care Team: Networked reference to record PCT     HPI:   83-year-old female with PMH of ESRD on PD, type 2 diabetes mellitus, hypertension, obstructive airway disease, CAD, breast cancer, hyperlipidemia brought to the ER for syncopal episode by family. Patient had gotten up to go to the bathroom with the help of her family. Got up and noticed to have LOC and tonic clonic jerking reported.  Pt was out for possibly 5min. Noted to have similar episodes on the last admission. Was discharged yesterday for pneumonia with pleural effusion. . EMS arrived shortly after and noted the hypotension with sbps in the 80s.Pt was supposed to take valsartan 80mg but didn't get the prescription. Daughter cut the medication to 160 from 380mg. . Denies any CP or sob. No other symptoms reported at this time     presented with fatigue, lethargy, periodic involuntary jerks found to have Pseudomonas pneumonia with uncomplicated parapneumonic effusion. Underwent left thoracentesis, pleural fluid cultures negative so far. Respiratory cultures grew pansensitive Pseudomonas. Patient was seen by pulmonology and recommended 4-6 weeks of antibiotics for Pseudomonas pneumonia. Patient's symptoms improved but required supplemental oxygen at discharge. Discharged on levofloxacin 250 mg daily, end date 03/31 for 4 weeks therapy. Frequency of PD was increased from twice a week to daily as uremia thought to be contributing to periodic involuntary jerks. With daily PD, involuntary movements resolved. Antihypertensives initially held given  borderline low blood pressures, resumed at lower doses at the time of discharge.      CXR in the ER showed edema with left pleural effusion and hb of 6.7. Hypotension with sbp in the 90 in the ER    * No surgery found *      Hospital Course:   Pt admitted and antihypertensives held except for lasix. Given 2 u prbcs with great response. Hb at 8. Nephro consulted for dialysis. Pt has urinary retention and bladder scans showed more than 500cc several times. To be scan every 6 hours. Urology consulted and Would try to leave catheter out if possible but can re-insert if unable to void and needed. If catheter is re-inserted, can be discharged with lauren and can follow up with Kaitlyn Ruffin NP for removal. Pt is d/c f/u with lauren. Daughter states her pessary had fallen out and the pessary had helped with urinary retention. Gyn is consult: Possible that pessary was causing urethral compression and preventing urination. Needs to stay out until area on rectum heals, likely after discharge since they are anticipating discharge soon. Ok to continue estrogen cream. May need different size or pessary without knob but should f/u with uro-gyn after discharge.          Goals of Care Treatment Preferences:  Code Status: Full Code      Consults:   Consults (From admission, onward)          Status Ordering Provider     Inpatient consult to Urology  Once        Provider:  (Not yet assigned)    Completed PATEL MOROCHO     Inpatient consult to Gynecology  Once        Provider:  (Not yet assigned)    Completed PATEL MOROCHO     Inpatient consult to Nephrology-[facility] (Rashawn Austin, Gissell)  Once        Provider:  (Not yet assigned)    Acknowledged PATEL MOROCHO            Neuro  Diabetic polyneuropathy associated with type 2 diabetes mellitus  Resume home meds  ISS and monitor      Pulmonary  Community acquired pneumonia  Cont levaquin from previous admission      Cardiac/Vascular  Acute on chronic  combined systolic and diastolic heart failure  Dialysis per nephro      Renal/  ESRD on peritoneal dialysis  Nephro consult  Dialysis per nephro      Urinary retention     Pt has urinary retention and bladder scans showed more than 500cc several times. To be scan every 6 hours.     Urology consulted and Would try to leave catheter out if possible but can re-insert if unable to void and needed. If catheter is re-inserted, can be discharged with lauren and can follow up with Kaitlyn Ruffin NP for removal. Pt is d/c f/u with lauren. Daughter states her pessary had fallen out and the pessary had helped with urinary retention.     Gyn is consult: Possible that pessary was causing urethral compression and preventing urination. Needs to stay out until area on rectum heals, likely after discharge since they are anticipating discharge soon. Ok to continue estrogen cream. May need different size or pessary without knob but should f/u with uro-gyn after discharge.     Oncology  Iron deficiency anemia  Patient's anemia is currently uncontrolled. Has received 1 units of PRBCs on 3/12/24 . Etiology likely d/t chronic disease due to ESRD  Current CBC reviewed-   Lab Results   Component Value Date    HGB 7.5 (L) 03/14/2024    HCT 24.5 (L) 03/14/2024     Monitor serial CBC and transfuse if patient becomes hemodynamically unstable, symptomatic or H/H drops below 7/21.    Endocrine  Type 2 diabetes mellitus with stage 4 chronic kidney disease and hypertension  Creatine stable for now. BMP reviewed- noted Estimated Creatinine Clearance: 6.8 mL/min (A) (based on SCr of 5.1 mg/dL (H)). according to latest data. 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  Monitor UOP and serial BMP and adjust therapy as needed. Renally dose meds. Avoid nephrotoxic medications and procedures.    Other  * Syncope  Echo done recently march 1st   Carotid u/s with less than 50% blockage  Antihypertensive held  Suspect syncope from hypotension combined with  acute on chronic anemia    All antihypertensives held except for lasix. To be f/u and restarted by Outpt nephro or pcp        Final Active Diagnoses:    Diagnosis Date Noted POA    PRINCIPAL PROBLEM:  Syncope [R55] 03/12/2024 Yes    ESRD on peritoneal dialysis [N18.6, Z99.2] 08/22/2023 Not Applicable    Urinary retention [R33.9] 02/28/2022 Yes    Community acquired pneumonia [J18.9] 03/23/2021 Yes    Acute on chronic combined systolic and diastolic heart failure [I50.43] 03/23/2021 Yes    Iron deficiency anemia [D50.9] 05/16/2017 Yes    Diabetic polyneuropathy associated with type 2 diabetes mellitus [E11.42] 03/16/2016 Yes    Type 2 diabetes mellitus with stage 4 chronic kidney disease and hypertension [E11.22, I12.9, N18.4] 07/20/2015 Yes      Problems Resolved During this Admission:       Discharged Condition: stable    Disposition: Home-Health Care c    Follow Up:   Follow-up Information       OB/GYN Follow Up Follow up.    Why: Appointment requested. Please expect phone asmita from scheduling with appoitment date and time.             Urology Follow Up Follow up.    Why: Appointment requested. Please expect phone asmita from scheduling with appoitment date and time.                         Patient Instructions:      Diet Adult Regular     Notify your health care provider if you experience any of the following:  temperature >100.4     Notify your health care provider if you experience any of the following:  persistent nausea and vomiting or diarrhea     Notify your health care provider if you experience any of the following:  severe uncontrolled pain     Activity as tolerated       Significant Diagnostic Studies: Labs: BMP:   Recent Labs   Lab 03/13/24 0318 03/14/24 0413    151*    137   K 3.8 3.5   CL 94* 93*   CO2 27 28   BUN 54* 57*   CREATININE 5.2* 5.1*   CALCIUM 6.8* 6.7*    and CBC   Recent Labs   Lab 03/13/24 0318 03/14/24 0413   WBC 12.27 11.95   HGB 8.0* 7.5*   HCT 25.3* 24.5*     192       Pending Diagnostic Studies:       None           Medications:  Reconciled Home Medications:      Medication List        CHANGE how you take these medications      * estradioL 0.01 % (0.1 mg/gram) vaginal cream  Commonly known as: ESTRACE  PLACE 1 GRAM VAGINALLY EVERY OTHER DAY  What changed: See the new instructions.     * estradioL 2 mg (7.5 mcg /24 hour) vaginal ring  Commonly known as: ESTRING  Place 2 mg vaginally every 3 (three) months.  What changed: Another medication with the same name was changed. Make sure you understand how and when to take each.           * This list has 2 medication(s) that are the same as other medications prescribed for you. Read the directions carefully, and ask your doctor or other care provider to review them with you.                CONTINUE taking these medications      acetaminophen 500 MG tablet  Commonly known as: TYLENOL  Take 500 mg by mouth as needed.     albuterol 90 mcg/actuation inhaler  Commonly known as: PROVENTIL/VENTOLIN HFA  Inhale 2 puffs into the lungs every 6 (six) hours as needed for Wheezing. Rescue     albuterol-ipratropium 2.5 mg-0.5 mg/3 mL nebulizer solution  Commonly known as: DUO-NEB  Take 3 mLs by nebulization every 4 (four) hours as needed for Wheezing.     aspirin 81 MG Chew  Take 81 mg by mouth once daily.     blood sugar diagnostic Strp  Commonly known as: ACCU-CHEK HECTOR  Uses Accu-Check Hector meter to test BG 4x/day     calcitRIOL 0.5 MCG Cap  Commonly known as: ROCALTROL  Take 1 capsule (0.5 mcg total) by mouth once daily.     cholecalciferol (vitamin D3) 50 mcg (2,000 unit) Tab  Commonly known as: VITAMIN D3  Take 1 tablet by mouth once daily.     cyanocobalamin 100 MCG tablet  Commonly known as: VITAMIN B-12  Take 100 mcg by mouth once daily.     epoetin [place]-epbx 10,000 unit/mL imjection  Commonly known as: RETACRIT  Inject 1 mL (10,000 Units total) into the skin every 30 days.     ergocalciferol 50,000 unit Cap  Commonly known as:  "ERGOCALCIFEROL  Take 50,000 Units by mouth every 7 days.     fluticasone propionate 50 mcg/actuation nasal spray  Commonly known as: FLONASE  1 spray by Each Nostril route daily as needed.     furosemide 80 MG tablet  Commonly known as: LASIX  Take 1 tablet (80 mg total) by mouth 2 (two) times a day.     lancets Misc  Commonly known as: ACCU-CHEK SOFTCLIX LANCETS  Uses Accu-Chek Angelica meter to test BG 2x/day     LANTUS SOLOSTAR U-100 INSULIN glargine 100 units/mL SubQ pen  Generic drug: insulin  Inject 8 Units into the skin every evening.     levoFLOXacin 250 MG tablet  Commonly known as: LEVAQUIN  Take 1 tablet (250 mg total) by mouth once daily. for 20 days     montelukast 10 mg tablet  Commonly known as: SINGULAIR  Take 1 tablet (10 mg total) by mouth once daily.     nitroGLYCERIN 0.4 MG SL tablet  Commonly known as: NITROSTAT  Place 0.4 mg under the tongue every 5 (five) minutes as needed for Chest pain.     omega-3 fatty acids 500 mg Cap  Take 1 capsule by mouth Daily.     pen needle, diabetic 31 gauge x 3/16" Ndle  Commonly known as: BD ULTRA-FINE MINI PEN NEEDLE  Use with insulin twice a day.     pravastatin 40 MG tablet  Commonly known as: PRAVACHOL  Take 1 tablet (40 mg total) by mouth once daily.     sevelamer carbonate 800 mg Tab  Commonly known as: RENVELA  Take 2 tablets (1,600 mg total) by mouth 3 (three) times daily.     sodium bicarbonate 650 MG tablet  Take 1 tablet (650 mg total) by mouth 4 (four) times daily.            STOP taking these medications      carvediloL 25 MG tablet  Commonly known as: COREG     valsartan 320 MG tablet  Commonly known as: DIOVAN              Indwelling Lines/Drains at time of discharge:   Lines/Drains/Airways       Drain  Duration                  Urethral Catheter 03/14/24 1303 Double-lumen 16 Fr. <1 day                    Time spent on the discharge of patient: >30 minutes         Maureen Gray MD  Department of Hospital Medicine  [facility]  "

## 2024-03-14 NOTE — PROGRESS NOTES
Nephrology Progress  Note       Consult Requested By: Maureen Gray MD  Reason for Consult: ESRD on PD      SUBJECTIVE:            Review of Systems   Constitutional:  Negative for chills, fever and malaise/fatigue.   HENT:  Negative for congestion and sore throat.    Eyes:  Negative for blurred vision, double vision and photophobia.   Respiratory:  Negative for cough and shortness of breath.    Cardiovascular:  Negative for chest pain, palpitations and leg swelling.   Gastrointestinal:  Negative for abdominal pain, diarrhea, nausea and vomiting.   Genitourinary:  Negative for dysuria and urgency.   Musculoskeletal:  Negative for joint pain and myalgias.   Skin:  Negative for itching and rash.   Neurological:  Positive for weakness. Negative for dizziness, sensory change and headaches.   Endo/Heme/Allergies:  Negative for polydipsia. Does not bruise/bleed easily.   Psychiatric/Behavioral:  Negative for depression.        Past Medical History:   Diagnosis Date    Acute encephalopathy 02/29/2024    Acute hypoxemic respiratory failure 12/19/2019    Acute right-sided thoracic back pain 12/09/2019    Allergy     Asthma     Basal cell carcinoma     left forehead    Basal cell carcinoma     left nose    Basal cell carcinoma 05/20/2015    right nose    Basal cell carcinoma 12/22/2015    left lower post neck    Basal cell carcinoma 12/03/2019    left ant scalp     BCC (basal cell carcinoma of skin) 10/20/2022    Right alar groove    Bilateral pleural effusion     Bilateral renal cysts     Breast cancer     CAD (coronary artery disease)     Cardiomyopathy     Cardiomyopathy, ischemic     Cataract     Chest pain 03/04/2024    CHF (congestive heart failure)     CKD (chronic kidney disease) stage 4, GFR 15-29 ml/min     Colon polyp 2011    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, with long-term current use of insulin 02/22/2018    COPD (chronic  obstructive pulmonary disease)     COPD exacerbation 04/08/2018    Current mild episode of major depressive disorder without prior episode 01/25/2022    Defibrillator discharge     Dependence on renal dialysis 08/22/2023    Diabetes mellitus     Diabetes mellitus type II     Diabetes with neurologic complications     Edema     ESRD needing dialysis     Goals of care, counseling/discussion 07/08/2022    Goiter     MNG    Hematuria, unspecified     HX: breast cancer     Hyperlipidemia     Hypertension     Hypocalcemia     Hypokalemia     Hyponatremia     Hyponatremia 04/02/2022    Iron deficiency anemia 05/16/2017    Iron deficiency anemia     Iron deficiency anemia     Left kidney mass     Meningioma     Microalbuminuria due to type 2 diabetes mellitus 01/26/2022    Osteoporosis, postmenopausal     Pneumonia 12/08/2019    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    Proteinuria 01/21/2019    Pseudomonas pneumonia     SCC (squamous cell carcinoma) 08/25/2022    right posterior neck    Skin cancer     s/p excision    Sleep apnea     CPAP    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/2015    mid forehead    Unspecified vitamin D deficiency     UTI (urinary tract infection) 04/02/2022    Ventricular tachycardia     Vitamin B12 deficiency     Vitamin D deficiency disease          OBJECTIVE:     Vital Signs (Most Recent)  Vitals:    03/14/24 0830 03/14/24 0855 03/14/24 1217 03/14/24 1226   BP: 109/63  (!) 151/67    BP Location: Left arm  Left arm    Patient Position: Sitting  Sitting    Pulse: (!) 56 (!) 58 79 (!) 59   Resp: 17 17 18    Temp: 98.2 °F (36.8 °C)  98.1 °F (36.7 °C)    TempSrc: Oral  Oral    SpO2: (!) 93% 96% (!) 94%    Weight:             Date 03/14/24 0700 - 03/15/24 0659   Shift 9668-1508 1093-7589 5537-4017 24 Hour Total   INTAKE   Shift Total(mL/kg)       OUTPUT   Other 38   38   Shift Total(mL/kg) 38(0.7)   38(0.7)   Weight (kg) 58.4 58.4 58.4 58.4             Medications:   aspirin  81 mg Oral Daily     calcitRIOL  0.5 mcg Oral Daily    calcium carbonate  1,000 mg Oral TID    cyanocobalamin  100 mcg Oral Daily    ergocalciferol  50,000 Units Oral Q7 Days    fluticasone propionate  1 spray Each Nostril Daily    furosemide  80 mg Oral BID    levoFLOXacin  500 mg Oral Every other day    polyethylene glycol  17 g Oral Daily    pravastatin  40 mg Oral Daily    sevelamer carbonate  1,600 mg Oral TID    sodium bicarbonate  650 mg Oral QID    tamsulosin  0.4 mg Oral Daily    vitamin D  2,000 Units Oral Daily           Physical Exam  Vitals and nursing note reviewed.   Constitutional:       General: She is not in acute distress.     Appearance: She is not diaphoretic.      Comments: Frail    HENT:      Head: Normocephalic and atraumatic.      Mouth/Throat:      Pharynx: No oropharyngeal exudate.   Eyes:      General: No scleral icterus.     Conjunctiva/sclera: Conjunctivae normal.      Pupils: Pupils are equal, round, and reactive to light.   Cardiovascular:      Rate and Rhythm: Normal rate and regular rhythm.      Heart sounds: Normal heart sounds. No murmur heard.  Pulmonary:      Effort: Pulmonary effort is normal. No respiratory distress.      Breath sounds: Examination of the left-lower field reveals rales. Rales present.   Abdominal:      General: Bowel sounds are normal. There is no distension.      Palpations: Abdomen is soft.      Tenderness: There is no abdominal tenderness.      Comments: PD catheter site no drainage no TTP    Musculoskeletal:         General: Normal range of motion.      Cervical back: Normal range of motion and neck supple.   Skin:     General: Skin is warm and dry.      Findings: No erythema.   Neurological:      Mental Status: She is alert and oriented to person, place, and time.      Cranial Nerves: No cranial nerve deficit.      Motor: Weakness present.   Psychiatric:         Mood and Affect: Affect normal.         Cognition and Memory: Memory normal.         Judgment: Judgment normal.          Laboratory:  Recent Labs   Lab 03/12/24  1329 03/13/24  0318 03/14/24  0413   WBC 12.12 12.27 11.95   HGB 6.7* 8.0* 7.5*   HCT 21.6* 25.3* 24.5*    189 192   MONO 5.6  0.7 5.3  0.7 6.5  0.8   EOSINOPHIL 1.2 0.7 0.8       Recent Labs   Lab 03/12/24  1329 03/13/24  0318 03/14/24  0413    139 137   K 3.8 3.8 3.5   CL 93* 94* 93*   CO2 28 27 28   BUN 49* 54* 57*   CREATININE 4.8* 5.2* 5.1*   CALCIUM 6.8* 6.8* 6.7*   PHOS  --  7.3* 6.3*         Diagnostic Results:  X-Ray: Reviewed  US: Reviewed  Echo: Reviewed  ASSESSMENT/PLAN:     1. 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 Last admission had elevated BUN Cr so PD regiment was changed to daily   -- PD  with 1.5% 2L dwell  4 Cycles over 8 hr got better    -- was on  lasix 80 PO BID  at discharge supposed to  follow up with Dr Borrero in clinic and her PD nurse    Now here with near syncope most likely due to BP meds she took Valzartan at home, on my exam had no complains BUN Cr at range making urine but has Urinary retention bladder scan 500+ discussed with patient and family   retention remain need to  get lauren   then  can be discharged she had pessary placed 2 years ago for urinary retention and per family and patient it helped then it got displaced and caused local trauma and was removed 3/13 possibly the cause of her retention - need close f/u with GYN for new after area on rectum heals -- She received Icodextrin at home will resume daily exchanges at home then f/u with PD nurse      2. HTN   -  controlled with Lasix only 80 BID   -- Hold Valsartan   -- Discharge with lasix only rest of BP meds can be adjusted at PD clinic     3. Anemia of chronic kidney disease treated with MIGUEL   EPogen    monthly   Recent Labs   Lab 03/12/24  1329 03/13/24 0318 03/14/24  0413   HGB 6.7* 8.0* 7.5*   HCT 21.6* 25.3* 24.5*    189 192         Iron   Lab Results   Component Value Date    IRON 124 05/09/2023     TIBC 355 05/09/2023    FERRITIN 639 (H) 05/19/2023       4. MBD  - Hypocalcemia   --  PTH vit D   -- control phos    -- Tums 1000 BID for now as her Hypocalcemia should get better   Recent Labs   Lab 03/14/24  0413   CALCIUM 6.7*   PHOS 6.3*     -- Sevelamer 1600 TIDWM   Recent Labs   Lab 03/11/24  0917 03/12/24  1329   MG 1.8 1.7         Lab Results   Component Value Date    .6 (H) 03/04/2024    CALCIUM 6.7 (LL) 03/14/2024    CAION 0.77 (L) 03/04/2024    PHOS 6.3 (H) 03/14/2024     Lab Results   Component Value Date    [id number] 23 (L) 03/01/2024     Acidosis   -- PO Bicarbonate 650 TID     Lab Results   Component Value Date    CO2 28 03/14/2024       5. Nutrition/Hypoalbuminemia    Recent Labs   Lab 03/12/24  1329 03/14/24  0413   ALBUMIN 1.8* 1.9*       Nepro with meals TID.       Thank you for allowing me to participate in care of your patient  With any question please call   Anna Fairbanks MD     [facility]  ESTRELLA Morocho MD,   MD TAMIKA Isabel MD E. V. Harmon, NP  [id number]   [place], [id number] [phone]  After hours answering service: [phone]

## 2024-03-14 NOTE — PLAN OF CARE
Pt medically stable to d/c home. Pt daughter Betsy and son in law in room. Pt PCC already scheduled. SW requested Urology and OBGYN f/u. SW sent referrals for HHS. No provider to accept HHS but SW to update family with HHS update and appts. No other D/C needs identified.   Pt clear from CM.     Future Appointments   Date Time Provider Department Center   3/25/2024  9:30 AM Ev Jiménez MD [facility] IMPRI [facility]   4/10/2024  3:30 PM Kelvin Maki MD [facility] PULMSVC [facility] [address]   4/17/2024  9:00 AM Eric Brooke PA-C [facility] UROGYN [facility]   4/24/2024 10:40 AM Dayton Michael MD [facility] FAM MED [facility]   6/5/2024 12:15 PM EKG, APPT NOM EKG [facility] [address]   6/5/2024 12:40 PM COORDINATED DEVICE CHECK NOM ARRHPRO [facility] [address]   6/5/2024  1:30 PM Celeste Rogers, NP NOMC ARRHYTH [facility] [address]        03/14/24 1431   Final Note   Assessment Type Final Discharge Note   Anticipated Discharge Disposition Home   What phone number can be called within the next 1-3 days to see how you are doing after discharge? [phone]   Hospital Resources/Appts/Education Provided Appointments scheduled and added to AVS   Post-Acute Status   Post-Acute Authorization Home Health   Home Health Status Referrals Sent   Discharge Delays None known at this time     Mitali Talley LMSW  Phone: [phone]  [facility]

## 2024-03-14 NOTE — PROGRESS NOTES
[facility] - Telemetry   Urology  Progress Note    Patient Name: Myesha Quinones  MRN: [id number]  Admission Date: 3/12/2024  Hospital Length of Stay: 0 days      Subjective:     Interval History:   Asymptomatic  CIC x 1 last night  Last bladder scan 399    Review of Systems:  A review of 10+ systems was conducted with pertinent positive and negative findings documented in HPI with all other systems reviewed and negative.    Objective:     Vitals:  Temp:  [97.8 °F (36.6 °C)-98.2 °F (36.8 °C)] 98.2 °F (36.8 °C)  Pulse:  [] 58  Resp:  [16-18] 17  SpO2:  [93 %-99 %] 96 %  BP: (109-157)/(63-85) 109/63       I/O:  Intake/Output Summary (Last 24 hours) at 3/14/2024 1127  Last data filed at 3/14/2024 0825  Gross per 24 hour   Intake 118 ml   Output 378 ml   Net -260 ml        Physical Exam:  GENERAL: patient sitting comfortably  SKIN: warm, dry, well perfused  EXT: no bruising or edema  NEURO: grossly normal with no focal deficits  PSYCH: appropriate mood and affect  BACK: No CVA tenderness  ABDO: non-distended    Significant Labs:  CBC:  Recent Labs   Lab 03/12/24  1329 03/13/24  0318 03/14/24  0413   WBC 12.12 12.27 11.95   HGB 6.7* 8.0* 7.5*   HCT 21.6* 25.3* 24.5*    189 192       BMP:  Recent Labs   Lab 03/12/24  1329 03/13/24  0318 03/14/24  0413    139 137   K 3.8 3.8 3.5   CL 93* 94* 93*   CO2 28 27 28   BUN 49* 54* 57*   CREATININE 4.8* 5.2* 5.1*   CALCIUM 6.8* 6.8* 6.7*         Urology Specific Assessment:     83-year-old female with history of urinary retention. 550 cc of clear urine returned after straight catheterization this morning.     Plan:     Would catheterize her if bladder scan volumes are > 500cc and patient is symptomatic (feels pressure, pain, inability to void). Would try to leave catheter out if possible but can re-insert if unable to void and needed  If catheter is re-inserted, can be discharged with lauren and can follow up with Kaitlyn Ruffin NP for removal. Discharge managers  can arrange  Will sign off    Jag Benz MD  Urology  [facility]

## 2024-03-14 NOTE — PLAN OF CARE
Discharge orders noted. Additional clinical references attached.    Patient's discharge instructions given by bedside RN and reviewed via this VN.  Education provided on new medication, diagnosis, and follow-up appointments.  Teach back method used. Patient verbalized understanding. All questions answered. Floor nurse notified.      03/14/24 1426   AVS Confirmation   Discharge instructions and AVS given to and reviewed with patient and/or significant other. Yes

## 2024-03-14 NOTE — ASSESSMENT & PLAN NOTE
Pt has urinary retention and bladder scans showed more than 500cc several times. To be scan every 6 hours.     Urology consulted and Would try to leave catheter out if possible but can re-insert if unable to void and needed. If catheter is re-inserted, can be discharged with lauren and can follow up with Kaitlyn Ruffin NP for removal. Pt is d/c f/u with lauren. Daughter states her pessary had fallen out and the pessary had helped with urinary retention.     Gyn is consult: Possible that pessary was causing urethral compression and preventing urination. Needs to stay out until area on rectum heals, likely after discharge since they are anticipating discharge soon. Ok to continue estrogen cream. May need different size or pessary without knob but should f/u with uro-gyn after discharge.

## 2024-03-14 NOTE — PLAN OF CARE
Problem: Physical Therapy  Goal: Physical Therapy Goal  Description: Goals to be met by: 24     Patient will increase functional independence with mobility by performin. Supine to sit with Modified [place]  2. Sit to supine with Modified [place]  3. Sit to stand transfer with Modified [place] with use of RW.  4. Bed to chair transfer with Modified [place] using Rolling Walker  5. Gait  x 150 feet with Modified [place] using Rolling Walker.     Outcome: Ongoing, Progressing       Pt ambulated ~65ft using RW and SBA with brief moments of CGA 2* safety awareness.

## 2024-03-14 NOTE — ASSESSMENT & PLAN NOTE
Echo done recently march 1st   Carotid u/s with less than 50% blockage  Antihypertensive held  Suspect syncope from hypotension combined with acute on chronic anemia    All antihypertensives held except for lasix. To be f/u and restarted by Outpt nephro or pcp

## 2024-03-14 NOTE — ASSESSMENT & PLAN NOTE
Patient's anemia is currently uncontrolled. Has received 1 units of PRBCs on 3/12/24 . Etiology likely d/t chronic disease due to ESRD  Current CBC reviewed-   Lab Results   Component Value Date    HGB 7.5 (L) 03/14/2024    HCT 24.5 (L) 03/14/2024     Monitor serial CBC and transfuse if patient becomes hemodynamically unstable, symptomatic or H/H drops below 7/21.

## 2024-03-15 ENCOUNTER — PATIENT OUTREACH (OUTPATIENT)
Dept: ADMINISTRATIVE | Facility: CLINIC | Age: 84
End: 2024-03-15
Payer: MEDICARE

## 2024-03-15 ENCOUNTER — PATIENT MESSAGE (OUTPATIENT)
Dept: ADMINISTRATIVE | Facility: CLINIC | Age: 84
End: 2024-03-15
Payer: MEDICARE

## 2024-03-15 NOTE — PROGRESS NOTES
C3 nurse spoke with Myesha Quinones, patient's daughter, Betsy for a TCC post hospital discharge follow up call. The patient has a scheduled HOSFU appointment with Ev Jiménez MD on 3/25/2024 at 9:30 AM.

## 2024-03-15 NOTE — PLAN OF CARE
"0800  Patient was accepted by [facility] . CM was informed by the pt's daughter, Betsy Bowen ([phone]), that the pt discharged to [facility] ([address] [place] [address], [id number]). CM informed nurse Deloris ([phone]), of above.     3/16/2024  CM was informed by [facility] that they are "out of network" with the pt's insurance & will not be able to provide HH services.     3/18/2024   CM was informed by Maia ([phone]) that [facility]- will provide HH services.   "

## 2024-03-16 LAB
BACTERIA UR CULT: ABNORMAL
OHS QRS DURATION: 124 MS
OHS QTC CALCULATION: 466 MS

## 2024-03-18 ENCOUNTER — TELEPHONE (OUTPATIENT)
Dept: UROLOGY | Facility: CLINIC | Age: 84
End: 2024-03-18
Payer: MEDICARE

## 2024-03-18 NOTE — TELEPHONE ENCOUNTER
Spoke with Betsy patient daughter, she wanted to notify the office on the appointment change and she had question about the visit, I explain what would take place. Betsy voice her understanding.

## 2024-03-18 NOTE — TELEPHONE ENCOUNTER
----- Message from Tim Muñoz sent at 3/15/2024 11:48 AM CDT -----  Type:  Needs Medical Advice    Who Called: pt Daughter Betsy     Would the patient rather a call back or a response via [facility]? call  Best Call Back Number: [phone]  Additional Information: hira balderas regarding schedule appointment on 03/18/20224 would like to know if mother should be seen just received Cath/ on yesterday

## 2024-03-19 ENCOUNTER — TELEPHONE (OUTPATIENT)
Dept: UROLOGY | Facility: CLINIC | Age: 84
End: 2024-03-19
Payer: MEDICARE

## 2024-03-19 NOTE — TELEPHONE ENCOUNTER
----- Message from Herminio Hurst MA sent at 3/19/2024  1:59 PM CDT -----    ----- Message -----  From: Angie Lu  Sent: 3/19/2024   1:47 PM CDT  To: [facility] Urology Clinical Staff    Type:  Patient Returning Call    Who Called: pt's daughter    Who Left Message for Patient: Frida Muñoz MA  Does the patient know what this is regarding?:   Would the patient rather a call back or a response via [facility]? call  Best Call Back Number: [phone]  Additional Information:

## 2024-03-19 NOTE — TELEPHONE ENCOUNTER
Spoke with patient daughter and informed the appointment will be reschedule and the NP will reach out to the urogyn for an sooner appointment to repair the pessary before tying a voiding trail. I informed patient daughter I will reach back out to her once we reach out to the obgyn of her choice. Betsy voice her understanding.

## 2024-03-20 ENCOUNTER — PATIENT MESSAGE (OUTPATIENT)
Dept: UROGYNECOLOGY | Facility: CLINIC | Age: 84
End: 2024-03-20
Payer: MEDICARE

## 2024-03-21 ENCOUNTER — OFFICE VISIT (OUTPATIENT)
Dept: UROGYNECOLOGY | Facility: CLINIC | Age: 84
End: 2024-03-21
Payer: MEDICARE

## 2024-03-21 VITALS
HEART RATE: 90 BPM | WEIGHT: 128.75 LBS | BODY MASS INDEX: 22.81 KG/M2 | RESPIRATION RATE: 18 BRPM | DIASTOLIC BLOOD PRESSURE: 74 MMHG | SYSTOLIC BLOOD PRESSURE: 160 MMHG | HEIGHT: 63 IN

## 2024-03-21 DIAGNOSIS — N81.9 VAGINAL VAULT PROLAPSE: Primary | ICD-10-CM

## 2024-03-21 DIAGNOSIS — Z46.89 PESSARY MAINTENANCE: ICD-10-CM

## 2024-03-21 DIAGNOSIS — R33.9 URINARY RETENTION: ICD-10-CM

## 2024-03-21 PROCEDURE — 99213 OFFICE O/P EST LOW 20 MIN: CPT | Mod: 25,S$GLB,, | Performed by: NURSE PRACTITIONER

## 2024-03-21 PROCEDURE — 3078F DIAST BP <80 MM HG: CPT | Mod: CPTII,S$GLB,, | Performed by: NURSE PRACTITIONER

## 2024-03-21 PROCEDURE — 3077F SYST BP >= 140 MM HG: CPT | Mod: CPTII,S$GLB,, | Performed by: NURSE PRACTITIONER

## 2024-03-21 PROCEDURE — 1111F DSCHRG MED/CURRENT MED MERGE: CPT | Mod: CPTII,S$GLB,, | Performed by: NURSE PRACTITIONER

## 2024-03-21 PROCEDURE — 99999 PR PBB SHADOW E&M-EST. PATIENT-LVL V: CPT | Mod: PBBFAC,,, | Performed by: NURSE PRACTITIONER

## 2024-03-21 PROCEDURE — 1159F MED LIST DOCD IN RCRD: CPT | Mod: CPTII,S$GLB,, | Performed by: NURSE PRACTITIONER

## 2024-03-21 PROCEDURE — 3288F FALL RISK ASSESSMENT DOCD: CPT | Mod: CPTII,S$GLB,, | Performed by: NURSE PRACTITIONER

## 2024-03-21 PROCEDURE — 1101F PT FALLS ASSESS-DOCD LE1/YR: CPT | Mod: CPTII,S$GLB,, | Performed by: NURSE PRACTITIONER

## 2024-03-21 PROCEDURE — 1126F AMNT PAIN NOTED NONE PRSNT: CPT | Mod: CPTII,S$GLB,, | Performed by: NURSE PRACTITIONER

## 2024-03-21 PROCEDURE — 51700 IRRIGATION OF BLADDER: CPT | Mod: S$GLB,,, | Performed by: NURSE PRACTITIONER

## 2024-03-21 PROCEDURE — 1160F RVW MEDS BY RX/DR IN RCRD: CPT | Mod: CPTII,S$GLB,, | Performed by: NURSE PRACTITIONER

## 2024-03-21 NOTE — PROGRESS NOTES
[facility] - UROGYNECOLOGY  [address] [address]  2024     Myesha Quinones  [id number]  1940    UROGYN FOLLOW-UP  3/21/2024     83 y.o. female,   presents for urogyn follow up for pessary insertion    HPI from 2022:  Patient pelvic organ prolapse referred for conservative therapy with pessary patient with indwelling De La Cruz catheter.  Soft that the pessary will assist in patient in decompressing bladder.     2022  Here for pessary fitting to help improve incomplete bladder emptying due to prolapse     2022:  Patient doing well, using her vaginal estrogen cream about once weekly. Her pessary has been staying in place and comfortable, feels like she is emptying her bladder well and leaking is resolved. Denies UTI symptoms and constipation     2023:  Patient doing well, using her vaginal estrogen cream about once weekly. Her pessary has been staying in place and comfortable, feels like she is emptying her bladder well and leaking is resolved. Denies UTI symptoms and constipation. Has some urinary urgency, but never incontinence. Wears small pantyliners without leakage.      2023:  Patient has no complaints. Using vaginal estrogen cream 2 nights per week.  Denies urinary leakage and constipation and UTI symptoms. She is about to start dialysis.     23:  Here for insertion of #4 pessary instead of #5 that is causing some abrasions and very painful for patient during pessary check/removal and reinsertion. She has been using vaginal estrogen cream.     01/10/2024  Patient is here for PC. She has been happy with #4 pessary with support and knob. It is staying in well. She had diarrhea recently and felt the pessary coming down lower, but she pushed it back up and has had no further issues. She says she is using her vaginal estrogen cream regularly, using the applicator.     Changes since last visit  Patient has had 3 hospitalizations  over the past few months for pneumonia, chest pain, syncope    Pessary came out last week-- discharged from hospital with a lauren  Here for void trial and pessary reinsertion    Past Medical History:   Diagnosis Date    Acute encephalopathy 02/29/2024    Acute hypoxemic respiratory failure 12/19/2019    Acute right-sided thoracic back pain 12/09/2019    Allergy     Asthma     Basal cell carcinoma     left forehead    Basal cell carcinoma     left nose    Basal cell carcinoma 05/20/2015    right nose    Basal cell carcinoma 12/22/2015    left lower post neck    Basal cell carcinoma 12/03/2019    left ant scalp     BCC (basal cell carcinoma of skin) 10/20/2022    Right alar groove    Bilateral pleural effusion     Bilateral renal cysts     Breast cancer     CAD (coronary artery disease)     Cardiomyopathy     Cardiomyopathy, ischemic     Cataract     Chest pain 03/04/2024    CHF (congestive heart failure)     CKD (chronic kidney disease) stage 4, GFR 15-29 ml/min     Colon polyp 2011    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, with long-term current use of insulin 02/22/2018    COPD (chronic obstructive pulmonary disease)     COPD exacerbation 04/08/2018    Current mild episode of major depressive disorder without prior episode 01/25/2022    Defibrillator discharge     Dependence on renal dialysis 08/22/2023    Diabetes mellitus     Diabetes mellitus type II     Diabetes with neurologic complications     Edema     ESRD needing dialysis     Goals of care, counseling/discussion 07/08/2022    Goiter     MNG    Hematuria, unspecified     HX: breast cancer     Hyperlipidemia     Hypertension     Hypocalcemia     Hypokalemia     Hyponatremia     Hyponatremia 04/02/2022    Iron deficiency anemia 05/16/2017    Iron deficiency anemia     Iron deficiency anemia     Left kidney mass     Meningioma     Microalbuminuria due to type 2 diabetes mellitus  01/26/2022    Osteoporosis, postmenopausal     Pneumonia 12/08/2019    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    Proteinuria 01/21/2019    Pseudomonas pneumonia     SCC (squamous cell carcinoma) 08/25/2022    right posterior neck    Skin cancer     s/p excision    Sleep apnea     CPAP    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/2015    mid forehead    Unspecified vitamin D deficiency     UTI (urinary tract infection) 04/02/2022    Ventricular tachycardia     Vitamin B12 deficiency     Vitamin D deficiency disease        Past Surgical History:   Procedure Laterality Date    BASAL CELL CARCINOMA EXCISION      posterior neck and nose    BREAST BIOPSY      BREAST CYST EXCISION Left     BREAST SURGERY      CARDIAC DEFIBRILLATOR PLACEMENT      x 2    CATARACT EXTRACTION W/  INTRAOCULAR LENS IMPLANT Bilateral     CHOLECYSTECTOMY      COLONOSCOPY N/A 11/5/2019    Procedure: COLONOSCOPY;  Surgeon: Boaz Botello MD;  Location: [facility] ENDO [address]);  Service: Endoscopy;  Laterality: N/A;  [facility]     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    FRACTURE SURGERY      left elbow and wrist as a child    HYSTERECTOMY      INSERTION, CATHETER, DIALYSIS, PERITONEAL, LAPAROSCOPIC N/A 4/25/2023    Procedure: INSERTION, CATHETER, DIALYSIS, PERITONEAL, LAPAROSCOPIC;  Surgeon: Marcelo Isaac MD;  Location: [facility] OR;  Service: General;  Laterality: N/A;    LAPAROSCOPIC LYSIS OF ADHESIONS N/A 4/25/2023    Procedure: LYSIS, ADHESIONS, LAPAROSCOPIC;  Surgeon: Marcelo Isaac MD;  Location: [facility] OR;  Service: General;  Laterality: N/A;    MASTECTOMY Right     OMENTOPEXY, LAPAROSCOPIC N/A 4/25/2023    Procedure: OMENTOPEXY, LAPAROSCOPIC;  Surgeon: Marcelo Isaac MD;  Location: [facility] OR;  Service: General;  Laterality: N/A;    REPLACEMENT OF IMPLANTABLE CARDIOVERTER-DEFIBRILLATOR (ICD) GENERATOR N/A 12/17/2018    Procedure: REPLACEMENT, ICD GENERATOR;  Surgeon: Jan Mckeon MD;  Location: [facility] EP LAB;  Service:  Cardiology;  Laterality: N/A;  DONNA, CRT-D gen change, MDT, MAC, SK, 3 Prep    REVISION OF SKIN POCKET FOR CARDIOVERTER-DEFIBRILLATOR  12/17/2018    Procedure: Revision, Skin Pocket, For Cardioverter-Defibrillator;  Surgeon: Jan Mckeon MD;  Location: [facility] EP LAB;  Service: Cardiology;;    SQUAMOUS CELL CARCINOMA EXCISION      remved from forehead    TONSILLECTOMY         Family History   Problem Relation Age of Onset    Asthma Mother     Hypertension Mother     Stroke Mother     Diabetes Father     Cardiomyopathy Father     Diabetes Sister     Heart disease Sister     Heart attack Sister     Heart attack Brother     Diabetes Brother     Heart disease Brother     Hypertension Brother     Diabetes Brother     Heart disease Brother     Hypertension Brother     Cerebral aneurysm Brother     Diabetes Brother     Heart disease Brother     Cancer Brother         colon    Diabetes Brother     Diabetes Daughter         prediabetes    Cancer Daughter         melanoma    Obesity Daughter     Melanoma Daughter     Cancer Son         skin    Diabetes Son         prediabetes    Diabetes Son     No Known Problems Maternal Grandmother     No Known Problems Maternal Grandfather     No Known Problems Paternal Grandmother     No Known Problems Paternal Grandfather     Amblyopia Neg Hx     Blindness Neg Hx     Cataracts Neg Hx     Glaucoma Neg Hx     Macular degeneration Neg Hx     Retinal detachment Neg Hx     Strabismus Neg Hx     Thyroid disease Neg Hx        Social History     Socioeconomic History    Marital status:    Occupational History    Occupation: Homemaker     Employer: OTHER   Tobacco Use    Smoking status: Never     Passive exposure: Never    Smokeless tobacco: Never   Substance and Sexual Activity    Alcohol use: No     Alcohol/week: 0.0 standard drinks of alcohol    Drug use: No    Sexual activity: Yes     Partners: Male   Other Topics Concern    Are you pregnant or think you may be? No    Breast-feeding No      Social Determinants of Health     Financial Resource Strain: Low Risk  (8/22/2023)    Overall Financial Resource Strain (CARDIA)     Difficulty of Paying Living Expenses: Not hard at all   Food Insecurity: No Food Insecurity (8/22/2023)    Hunger Vital Sign     Worried About Running Out of Food in the Last Year: Never true     Ran Out of Food in the Last Year: Never true   Transportation Needs: No Transportation Needs (8/22/2023)    PRAPARE - Transportation     Lack of Transportation (Medical): No     Lack of Transportation (Non-Medical): No   Physical Activity: Inactive (8/22/2023)    Exercise Vital Sign     Days of Exercise per Week: 0 days     Minutes of Exercise per Session: 0 min   Stress: No Stress Concern Present (8/22/2023)    [employer] [place] [employer] - Occupational Stress Questionnaire     Feeling of Stress : Only a little   Social Connections: Moderately Isolated (8/22/2023)    Social Connection and Isolation Panel [NHANES]     Frequency of Communication with Friends and Family: More than three times a week     Frequency of Social Gatherings with Friends and Family: More than three times a week     Attends Quaker Services: More than 4 times per year     Active Member of Clubs or Organizations: No     Attends Club or Organization Meetings: Never     Marital Status:    Housing Stability: Low Risk  (8/22/2023)    Housing Stability Vital Sign     Unable to Pay for Housing in the Last Year: No     Number of Places Lived in the Last Year: 1     Unstable Housing in the Last Year: No       Current Outpatient Medications   Medication Sig Dispense Refill    albuterol (PROVENTIL/VENTOLIN HFA) 90 mcg/actuation inhaler Inhale 2 puffs into the lungs every 6 (six) hours as needed for Wheezing. Rescue 18 g 12    albuterol-ipratropium (DUO-NEB) 2.5 mg-0.5 mg/3 mL nebulizer solution Take 3 mLs by nebulization every 4 (four) hours as needed for Wheezing. 270 mL 12    aspirin 81 MG Chew Take 81  "mg by mouth once daily.      blood sugar diagnostic (ACCU-CHEK HECTOR) Strp Uses Accu-Check Hector meter to test BG 4x/day 400 strip 6    cholecalciferol, vitamin D3, (VITAMIN D3) 50 mcg (2,000 unit) Tab Take 1 tablet by mouth once daily.       cyanocobalamin (VITAMIN B-12) 100 MCG tablet Take 100 mcg by mouth once daily.      epoetin [place]-epbx (RETACRIT) 10,000 unit/mL imjection Inject 1 mL (10,000 Units total) into the skin every 30 days. 1 mL 11    estradioL (ESTRACE) 0.01 % (0.1 mg/gram) vaginal cream PLACE 1 GRAM VAGINALLY EVERY OTHER DAY (Patient taking differently: Place 1 g vaginally every other day.) 42.5 g 3    estradioL (ESTRING) 2 mg (7.5 mcg /24 hour) vaginal ring Place 2 mg vaginally every 3 (three) months. 1 each 3    fluticasone propionate (FLONASE) 50 mcg/actuation nasal spray 1 spray by Each Nostril route daily as needed.      furosemide (LASIX) 80 MG tablet Take 1 tablet (80 mg total) by mouth 2 (two) times a day. 60 tablet 3    insulin (LANTUS SOLOSTAR U-100 INSULIN) glargine 100 units/mL SubQ pen Inject 8 Units into the skin every evening.  0    lancets (ACCU-CHEK SOFTCLIX LANCETS) Misc Uses Accu-Chek Hector meter to test BG 2x/day 400 each 6    levoFLOXacin (LEVAQUIN) 250 MG tablet Take 1 tablet (250 mg total) by mouth once daily. for 20 days 20 tablet 0    montelukast (SINGULAIR) 10 mg tablet Take 1 tablet (10 mg total) by mouth once daily. 90 tablet 3    nitroGLYCERIN (NITROSTAT) 0.4 MG SL tablet Place 0.4 mg under the tongue every 5 (five) minutes as needed for Chest pain.       omega-3 fatty acids 500 mg Cap Take 1 capsule by mouth Daily.      pen needle, diabetic (BD ULTRA-FINE MINI PEN NEEDLE) 31 gauge x 3/16" Ndle Use with insulin twice a day. 400 each 3    pravastatin (PRAVACHOL) 40 MG tablet Take 1 tablet (40 mg total) by mouth once daily. 90 tablet 3    sevelamer carbonate (RENVELA) 800 mg Tab Take 2 tablets (1,600 mg total) by mouth 3 (three) times daily. 180 tablet 3    sodium " "bicarbonate 650 MG tablet Take 1 tablet (650 mg total) by mouth 4 (four) times daily. 120 tablet 3    acetaminophen (TYLENOL) 500 MG tablet Take 500 mg by mouth as needed.      calcitRIOL (ROCALTROL) 0.5 MCG Cap Take 1 capsule (0.5 mcg total) by mouth once daily. (Patient not taking: Reported on 3/21/2024) 30 capsule 11    ergocalciferol (ERGOCALCIFEROL) 50,000 unit Cap Take 50,000 Units by mouth every 7 days.       No current facility-administered medications for this visit.       Review of patient's allergies indicates:   Allergen Reactions    Iodine and iodide containing products Hives and Other (See Comments)     Not specified     Nifedipine      weakness       OB History          3    Para   3    Term   3            AB        Living             SAB        IAB        Ectopic        Multiple        Live Births                          ROS  As per HPI.      Physical Exam  BP (!) 160/74   Pulse 90   Resp 18   Ht 5' 3" (1.6 m)   Wt 58.4 kg (128 lb 12 oz)   LMP  (LMP Unknown)   BMI 22.81 kg/m²   General: alert and oriented, no acute distress  Respiratory: normal respiratory effort  Abd: soft, non-tender, non-distended--ecchymosis noted.  Peritoneal dialysis catheter in place    Pelvic:  Ext. Genitalia: normal external genitalia. Normal bartholin's and skeens glands  Vagina: + atrophy. Normal vaginal mucosa with lesions/abrasions from pessary. One dime sized abrasion along posterior vagina and one at cuff. No discharge noted.   Non-tender bladder base without palpable mass.  Cervix: absent  Uterus:  surgically absent, vaginal cuff well healed   Urethra: no masses or tenderness  Urethral meatus: no lesions, caruncle or prolapse.    #4 ring with support with knob pessary inserted.    The patient was placed in dorsal lithotomy position with feet in stirrups.    The bladder was filled with 250 mL sterile water to gravity with a 60 mL lauren tipped syringe, at which point she voiced a strong desire to " void.  The catheter was removed. She voided 280 mL.  She tolerated the procedure well.        Impression  1. Vaginal vault prolapse        2. Urinary retention        3. Pessary maintenance            We reviewed the above issues and discussed options for short-term versus long-term management of her problems.     Plan:   Incomplete bladder emptying due to prolapse  -- continue #4 ring with support and incontinence knob  -- patient is on dialysis  -- previous  with pessary in place  --passed void trial today       Vaginal atrophy (dryness):  --  use vaginal estrogen cream 2 nights per week     RTC as scheduled for pessary check    30 minutes were spent in face to face time with this patient  90 % of this time was spent in counseling and/or coordination of care    MONA Shepherd-BC  Division of Female Pelvic Medicine and Reconstructive Surgery  Department of Obstetrics & Gynecology  [facility] [place], [place]

## 2024-03-21 NOTE — PATIENT INSTRUCTIONS
Incomplete bladder emptying due to prolapse  -- continue #4 ring with support and incontinence knob  -- patient is on dialysis  -- previous  with pessary in place  --passed void trial today       Vaginal atrophy (dryness):  --  use vaginal estrogen cream 2 nights per week     RTC as scheduled for pessary check

## 2024-03-22 LAB
BODY FLD TYPE: NORMAL
CHOLEST FLD-MCNC: 25 MG/DL
CREAT FLD-MCNC: 5.7 MG/DL
TRIGL FLD-MCNC: 18 MG/DL

## 2024-03-24 DIAGNOSIS — E78.2 MIXED HYPERLIPIDEMIA: ICD-10-CM

## 2024-03-24 DIAGNOSIS — E78.5 HYPERLIPIDEMIA ASSOCIATED WITH TYPE 2 DIABETES MELLITUS: ICD-10-CM

## 2024-03-24 DIAGNOSIS — I50.42 CHRONIC COMBINED SYSTOLIC AND DIASTOLIC HEART FAILURE: ICD-10-CM

## 2024-03-24 DIAGNOSIS — E11.69 HYPERLIPIDEMIA ASSOCIATED WITH TYPE 2 DIABETES MELLITUS: ICD-10-CM

## 2024-03-24 DIAGNOSIS — J45.20 MILD INTERMITTENT CHRONIC ASTHMA WITHOUT COMPLICATION: ICD-10-CM

## 2024-03-24 NOTE — TELEPHONE ENCOUNTER
No care due was identified.  [facility] Embedded Care Due Messages. Reference number: [id number].   3/24/2024 4:13:03 AM CDT

## 2024-03-25 ENCOUNTER — OFFICE VISIT (OUTPATIENT)
Dept: PRIMARY CARE CLINIC | Facility: CLINIC | Age: 84
End: 2024-03-25
Payer: MEDICARE

## 2024-03-25 VITALS
TEMPERATURE: 98 F | WEIGHT: 127 LBS | OXYGEN SATURATION: 93 % | HEART RATE: 93 BPM | DIASTOLIC BLOOD PRESSURE: 72 MMHG | SYSTOLIC BLOOD PRESSURE: 154 MMHG | BODY MASS INDEX: 22.49 KG/M2

## 2024-03-25 DIAGNOSIS — E04.2 MULTIPLE THYROID NODULES: ICD-10-CM

## 2024-03-25 DIAGNOSIS — B37.49 CANDIDAL URINARY TRACT INFECTION: ICD-10-CM

## 2024-03-25 DIAGNOSIS — R33.9 URINARY RETENTION: ICD-10-CM

## 2024-03-25 DIAGNOSIS — N18.6 ESRD ON PERITONEAL DIALYSIS: ICD-10-CM

## 2024-03-25 DIAGNOSIS — J15.1 PNEUMONIA OF LEFT LOWER LOBE DUE TO PSEUDOMONAS SPECIES: Primary | ICD-10-CM

## 2024-03-25 DIAGNOSIS — J90 PLEURAL EFFUSION ON LEFT: ICD-10-CM

## 2024-03-25 DIAGNOSIS — Z99.2 ESRD ON PERITONEAL DIALYSIS: ICD-10-CM

## 2024-03-25 PROBLEM — R79.89 TROPONIN LEVEL ELEVATED: Status: RESOLVED | Noted: 2024-03-04 | Resolved: 2024-03-25

## 2024-03-25 PROBLEM — E87.1 HYPONATREMIA: Status: RESOLVED | Noted: 2022-04-02 | Resolved: 2024-03-25

## 2024-03-25 PROBLEM — J96.01 ACUTE HYPOXEMIC RESPIRATORY FAILURE: Status: RESOLVED | Noted: 2022-06-21 | Resolved: 2024-03-25

## 2024-03-25 PROBLEM — G93.41 ACUTE METABOLIC ENCEPHALOPATHY: Status: RESOLVED | Noted: 2024-02-29 | Resolved: 2024-03-25

## 2024-03-25 PROBLEM — E83.51 HYPOCALCEMIA: Status: RESOLVED | Noted: 2024-02-29 | Resolved: 2024-03-25

## 2024-03-25 PROBLEM — R55 SYNCOPE: Status: RESOLVED | Noted: 2024-03-12 | Resolved: 2024-03-25

## 2024-03-25 PROBLEM — G93.40 ACUTE ENCEPHALOPATHY: Status: RESOLVED | Noted: 2024-03-06 | Resolved: 2024-03-25

## 2024-03-25 PROCEDURE — 1111F DSCHRG MED/CURRENT MED MERGE: CPT | Mod: CPTII,S$GLB,, | Performed by: INTERNAL MEDICINE

## 2024-03-25 PROCEDURE — 3077F SYST BP >= 140 MM HG: CPT | Mod: CPTII,S$GLB,, | Performed by: INTERNAL MEDICINE

## 2024-03-25 PROCEDURE — 1101F PT FALLS ASSESS-DOCD LE1/YR: CPT | Mod: CPTII,S$GLB,, | Performed by: INTERNAL MEDICINE

## 2024-03-25 PROCEDURE — 99999 PR PBB SHADOW E&M-EST. PATIENT-LVL IV: CPT | Mod: PBBFAC,,, | Performed by: INTERNAL MEDICINE

## 2024-03-25 PROCEDURE — 3078F DIAST BP <80 MM HG: CPT | Mod: CPTII,S$GLB,, | Performed by: INTERNAL MEDICINE

## 2024-03-25 PROCEDURE — 99214 OFFICE O/P EST MOD 30 MIN: CPT | Mod: S$GLB,,, | Performed by: INTERNAL MEDICINE

## 2024-03-25 PROCEDURE — 3288F FALL RISK ASSESSMENT DOCD: CPT | Mod: CPTII,S$GLB,, | Performed by: INTERNAL MEDICINE

## 2024-03-25 PROCEDURE — 1159F MED LIST DOCD IN RCRD: CPT | Mod: CPTII,S$GLB,, | Performed by: INTERNAL MEDICINE

## 2024-03-25 PROCEDURE — 1126F AMNT PAIN NOTED NONE PRSNT: CPT | Mod: CPTII,S$GLB,, | Performed by: INTERNAL MEDICINE

## 2024-03-25 RX ORDER — PRAVASTATIN SODIUM 40 MG/1
40 TABLET ORAL
Qty: 90 TABLET | Refills: 1 | Status: ON HOLD | OUTPATIENT
Start: 2024-03-25 | End: 2024-04-23 | Stop reason: HOSPADM

## 2024-03-25 RX ORDER — MONTELUKAST SODIUM 10 MG/1
10 TABLET ORAL
Qty: 90 TABLET | Refills: 1 | Status: SHIPPED | OUTPATIENT
Start: 2024-03-25

## 2024-03-25 RX ORDER — TRAZODONE HYDROCHLORIDE 50 MG/1
25 TABLET ORAL NIGHTLY PRN
Qty: 30 TABLET | Refills: 11 | Status: ON HOLD
Start: 2024-03-25 | End: 2024-04-23 | Stop reason: HOSPADM

## 2024-03-25 RX ORDER — FLUCONAZOLE 100 MG/1
100 TABLET ORAL DAILY
Qty: 21 TABLET | Refills: 0
Start: 2024-03-25 | End: 2024-04-15

## 2024-03-25 NOTE — PROGRESS NOTES
[facility]   New Visit Progress Note   Recent Hospital Discharge     PRESENTING HISTORY     Chief Complaint/Reason for Admission:  Follow up Hospital Discharge   PCP: Dayton Michael MD    History of Present Illness:  Ms. Myesha Quinones is a 83 y.o. female who was recently admitted to the hospital.      [facility] Medicine  Discharge Summary        Patient Name: Myesha Quinones  MRN: [id number]  ALEXIS: [id number]  Patient Class: OP- Observation  Admission Date: 3/12/2024  Hospital Length of Stay: 0 days  Discharge Date and Time:  03/14/2024 4:10 PM  Attending Physician: Felicia att. providers found   Discharging Provider: Maureen Gray MD  Primary Care Provider: Dayton Michael MD  ___________________________________________________________________    Today:  [facility] for [facility] follow up.  Recently hospitalized for management of Pseudomonas pneumonia with parapneumonic effusion.  Admitted to [facility] Medicine service with Pulmonary consultation.   Empiric ABX initiated and tailored as culture results returned.  Patient underwent left thoracentesis 3/6/24.   Pleural fluid NGTD.  Pleural fluid cytology NEG for malignant cells.   Respiratory culture with pan sensitive Pseudomonas aeruginosa.   Discharged on Levaquin 250 mg x 4 weeks, therapy end date 3/31/24.   Hospital stay complicated by urinary retention requiring lauren placement.   Seen in consultation with GYN and Urology teams.  Concern that pessary was causing urethral compression and is to remain OUT for now.   Patient with known ESRD on Peritoneal dialysis-> Nephrology team consulted for in house dialysis.  Patient responded well to above interventions and supportive care.  Discharged to home with [facility].   Home Oxygen prescribed at discharge.  Lauren in place at discharge.     Seen by UroGyn team 3/21/24.  New pessary placed.   Passed voiding trail, lauren OUT.     Nephrology team  prescribed Fluconazole 100 mg daily, # 21.   Trazodone 25 mg prescribed for insomnia.   These medications have been added to her EPIC chart today.     Accompanied today by family.   Ambulating with Rolator.  Brought all medication bottles for review and reports compliance.      Review of Systems  General ROS: negative for chills, fever or weight loss  Psychological ROS: negative for hallucination, depression or suicidal ideation  Ophthalmic ROS: negative for blurry vision, photophobia or eye pain  ENT ROS: negative for epistaxis, sore throat or rhinorrhea  Respiratory ROS: no cough, shortness of breath, or wheezing  Cardiovascular ROS: no chest pain or dyspnea on exertion  Gastrointestinal ROS: no abdominal pain, change in bowel habits, or black/ bloody stools  Genito-Urinary ROS: no dysuria, trouble voiding, or hematuria  Musculoskeletal ROS: negative for gait disturbance or muscular weakness  Neurological ROS: no syncope or seizures; no ataxia  Dermatological ROS: negative for pruritis, rash and jaundice      PAST HISTORY:     Past Medical History:   Diagnosis Date    Acute encephalopathy 02/29/2024    Acute hypoxemic respiratory failure 12/19/2019    Acute right-sided thoracic back pain 12/09/2019    Allergy     Asthma     Basal cell carcinoma     left forehead    Basal cell carcinoma     left nose    Basal cell carcinoma 05/20/2015    right nose    Basal cell carcinoma 12/22/2015    left lower post neck    Basal cell carcinoma 12/03/2019    left ant scalp     BCC (basal cell carcinoma of skin) 10/20/2022    Right alar groove    Bilateral pleural effusion     Bilateral renal cysts     Breast cancer     CAD (coronary artery disease)     Cardiomyopathy     Cardiomyopathy, ischemic     Cataract     Chest pain 03/04/2024    CHF (congestive heart failure)     CKD (chronic kidney disease) stage 4, GFR 15-29 ml/min     Colon polyp 2011    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, with long-term current use of insulin 02/22/2018    COPD (chronic obstructive pulmonary disease)     COPD exacerbation 04/08/2018    Current mild episode of major depressive disorder without prior episode 01/25/2022    Defibrillator discharge     Dependence on renal dialysis 08/22/2023    Diabetes mellitus     Diabetes mellitus type II     Diabetes with neurologic complications     Edema     ESRD needing dialysis     Goals of care, counseling/discussion 07/08/2022    Goiter     MNG    Hematuria, unspecified     HX: breast cancer     Hyperlipidemia     Hypertension     Hypocalcemia     Hypokalemia     Hyponatremia     Hyponatremia 04/02/2022    Iron deficiency anemia 05/16/2017    Iron deficiency anemia     Iron deficiency anemia     Left kidney mass     Meningioma     Microalbuminuria due to type 2 diabetes mellitus 01/26/2022    Osteoporosis, postmenopausal     Pneumonia 12/08/2019    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    Proteinuria 01/21/2019    Pseudomonas pneumonia     SCC (squamous cell carcinoma) 08/25/2022    right posterior neck    Skin cancer     s/p excision    Sleep apnea     CPAP    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/2015    mid forehead    Unspecified vitamin D deficiency     UTI (urinary tract infection) 04/02/2022    Ventricular tachycardia     Vitamin B12 deficiency     Vitamin D deficiency disease        Past Surgical History:   Procedure Laterality Date    BASAL CELL CARCINOMA EXCISION      posterior neck and nose    BREAST BIOPSY      BREAST CYST EXCISION Left     BREAST SURGERY      CARDIAC DEFIBRILLATOR PLACEMENT      x 2    CATARACT EXTRACTION W/  INTRAOCULAR LENS IMPLANT Bilateral     CHOLECYSTECTOMY      COLONOSCOPY N/A 11/5/2019    Procedure: COLONOSCOPY;  Surgeon: Boaz Botello MD;  [facility] ([address] [place] [address]);  Service: Endoscopy;  Laterality: N/A;  AICD - [employer] -     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    FRACTURE  SURGERY      left elbow and wrist as a child    HYSTERECTOMY      INSERTION, CATHETER, DIALYSIS, PERITONEAL, LAPAROSCOPIC N/A 4/25/2023    Procedure: INSERTION, CATHETER, DIALYSIS, PERITONEAL, LAPAROSCOPIC;  Surgeon: Marcelo Isaac MD;  Location: [facility] OR;  Service: General;  Laterality: N/A;    LAPAROSCOPIC LYSIS OF ADHESIONS N/A 4/25/2023    Procedure: LYSIS, ADHESIONS, LAPAROSCOPIC;  Surgeon: Marcelo Isaac MD;  Location: [facility] OR;  Service: General;  Laterality: N/A;    MASTECTOMY Right     OMENTOPEXY, LAPAROSCOPIC N/A 4/25/2023    Procedure: OMENTOPEXY, LAPAROSCOPIC;  Surgeon: Marcelo Isaac MD;  Location: [facility] OR;  Service: General;  Laterality: N/A;    REPLACEMENT OF IMPLANTABLE CARDIOVERTER-DEFIBRILLATOR (ICD) GENERATOR N/A 12/17/2018    Procedure: REPLACEMENT, ICD GENERATOR;  Surgeon: Jan Mckeon MD;  Location: [facility] EP LAB;  Service: Cardiology;  Laterality: N/A;  DONNA, CRT-D gen change, MDT, MAC, SK, 3 Prep    REVISION OF SKIN POCKET FOR CARDIOVERTER-DEFIBRILLATOR  12/17/2018    Procedure: Revision, Skin Pocket, For Cardioverter-Defibrillator;  Surgeon: Jan Mckeon MD;  Location: [facility] EP LAB;  Service: Cardiology;;    SQUAMOUS CELL CARCINOMA EXCISION      remved from forehead    TONSILLECTOMY         Family History   Problem Relation Age of Onset    Asthma Mother     Hypertension Mother     Stroke Mother     Diabetes Father     Cardiomyopathy Father     Diabetes Sister     Heart disease Sister     Heart attack Sister     Heart attack Brother     Diabetes Brother     Heart disease Brother     Hypertension Brother     Diabetes Brother     Heart disease Brother     Hypertension Brother     Cerebral aneurysm Brother     Diabetes Brother     Heart disease Brother     Cancer Brother         colon    Diabetes Brother     Diabetes Daughter         prediabetes    Cancer Daughter         melanoma    Obesity Daughter     Melanoma Daughter     Cancer Son         skin    Diabetes Son         prediabetes     Diabetes Son     No Known Problems Maternal Grandmother     No Known Problems Maternal Grandfather     No Known Problems Paternal Grandmother     No Known Problems Paternal Grandfather     Amblyopia Neg Hx     Blindness Neg Hx     Cataracts Neg Hx     Glaucoma Neg Hx     Macular degeneration Neg Hx     Retinal detachment Neg Hx     Strabismus Neg Hx     Thyroid disease Neg Hx          MEDICATIONS & ALLERGIES:     Current Outpatient Medications on File Prior to Visit   Medication Sig Dispense Refill    albuterol (PROVENTIL/VENTOLIN HFA) 90 mcg/actuation inhaler Inhale 2 puffs into the lungs every 6 (six) hours as needed for Wheezing. Rescue 18 g 12    albuterol-ipratropium (DUO-NEB) 2.5 mg-0.5 mg/3 mL nebulizer solution Take 3 mLs by nebulization every 4 (four) hours as needed for Wheezing. 270 mL 12    aspirin 81 MG Chew Take 81 mg by mouth once daily.      blood sugar diagnostic (ACCU-CHEK HECTOR) Strp Uses Accu-Check Hector meter to test BG 4x/day 400 strip 6    calcitRIOL (ROCALTROL) 0.5 MCG Cap Take 1 capsule (0.5 mcg total) by mouth once daily. (Patient not taking: Reported on 3/21/2024) 30 capsule 11    cholecalciferol, vitamin D3, (VITAMIN D3) 50 mcg (2,000 unit) Tab Take 1 tablet by mouth once daily.       cyanocobalamin (VITAMIN B-12) 100 MCG tablet Take 100 mcg by mouth once daily.      epoetin j[place]ica-epbx (RETACRIT) 10,000 unit/mL imjection Inject 1 mL (10,000 Units total) into the skin every 30 days. 1 mL 11    estradioL (ESTRACE) 0.01 % (0.1 mg/gram) vaginal cream PLACE 1 GRAM VAGINALLY EVERY OTHER DAY (Patient taking differently: Place 1 g vaginally every other day.) 42.5 g 3    estradioL (ESTRING) 2 mg (7.5 mcg /24 hour) vaginal ring Place 2 mg vaginally every 3 (three) months. 1 each 3    fluticasone propionate (FLONASE) 50 mcg/actuation nasal spray 1 spray by Each Nostril route daily as needed.      furosemide (LASIX) 80 MG tablet Take 1 tablet (80 mg total) by mouth 2 (two) times a day. 60 tablet 3  "   insulin (LANTUS SOLOSTAR U-100 INSULIN) glargine 100 units/mL SubQ pen Inject 8 Units into the skin every evening.  0    lancets (ACCU-CHEK SOFTCLIX LANCETS) Misc Uses Accu-Chek Angelica meter to test BG 2x/day 400 each 6    levoFLOXacin (LEVAQUIN) 250 MG tablet Take 1 tablet (250 mg total) by mouth once daily. for 20 days 20 tablet 0    montelukast (SINGULAIR) 10 mg tablet TAKE 1 TABLET(10 MG) BY MOUTH EVERY DAY 90 tablet 1    nitroGLYCERIN (NITROSTAT) 0.4 MG SL tablet Place 0.4 mg under the tongue every 5 (five) minutes as needed for Chest pain.       omega-3 fatty acids 500 mg Cap Take 1 capsule by mouth Daily.      pen needle, diabetic (BD ULTRA-FINE MINI PEN NEEDLE) 31 gauge x 3/16" Ndle Use with insulin twice a day. 400 each 3    pravastatin (PRAVACHOL) 40 MG tablet TAKE 1 TABLET(40 MG) BY MOUTH EVERY DAY 90 tablet 1    sevelamer carbonate (RENVELA) 800 mg Tab Take 2 tablets (1,600 mg total) by mouth 3 (three) times daily. 180 tablet 3    sodium bicarbonate 650 MG tablet Take 1 tablet (650 mg total) by mouth 4 (four) times daily. 120 tablet 3    [DISCONTINUED] montelukast (SINGULAIR) 10 mg tablet Take 1 tablet (10 mg total) by mouth once daily. 90 tablet 3    [DISCONTINUED] pravastatin (PRAVACHOL) 40 MG tablet Take 1 tablet (40 mg total) by mouth once daily. 90 tablet 3     No current facility-administered medications on file prior to visit.        Review of patient's allergies indicates:   Allergen Reactions    Iodine and iodide containing products Hives and Other (See Comments)     Not specified     Nifedipine      weakness       OBJECTIVE:     Vital Signs:  BP (!) 154/72 (BP Location: Left arm, Patient Position: Sitting, BP Method: Small (Automatic))   Pulse 93   Temp 97.7 °F (36.5 °C) (Oral)   Wt 57.6 kg (126 lb 15.8 oz)   LMP  (LMP Unknown)   SpO2 (!) 93%   BMI 22.49 kg/m²   Wt Readings from Last 3 Encounters:   03/21/24 1309 58.4 kg (128 lb 12 oz)   03/14/24 0441 58.4 kg (128 lb 12 oz)   03/13/24 " 0537 58.7 kg (129 lb 6.6 oz)   03/12/24 1306 54.4 kg (120 lb)   03/04/24 1246 65.8 kg (145 lb 1 oz)   03/04/24 0407 65.8 kg (145 lb)     Body mass index is 22.49 kg/m².        Physical Exam:  BP (!) 154/72 (BP Location: Left arm, Patient Position: Sitting, BP Method: Small (Automatic))   Pulse 93   Temp 97.7 °F (36.5 °C) (Oral)   Wt 57.6 kg (126 lb 15.8 oz)   LMP  (LMP Unknown)   SpO2 (!) 93%   BMI 22.49 kg/m²   General appearance: alert, cooperative, no distress  Constitutional:Oriented to person, place, and time  + appears well-developed and well-nourished.   HEENT: Normocephalic, atraumatic, neck symmetrical, no nasal discharge   Eyes: conjunctivae/corneas clear, PERRL, EOM's intact  Lungs: clear to auscultation bilaterally, no dullness to percussion bilaterally  Heart: regular rate and rhythm without rub; no displacement of the PMI   Abdomen: soft, non-tender; bowel sounds normoactive; no organomegaly  Extremities: extremities symmetric; no clubbing, cyanosis,   + trace pitting edema bilateral LE   Integument: Skin color, texture, turgor normal; no rashes; hair distrubution normal  Neurologic: Alert and oriented X 3, normal strength, normal coordination and gait  Psychiatric: no pressured speech; normal affect; no evidence of impaired cognition     Laboratory  Lab Results   Component Value Date    WBC 11.95 03/14/2024    HGB 7.5 (L) 03/14/2024    HCT 24.5 (L) 03/14/2024    MCV 99 (H) 03/14/2024     03/14/2024     BMP  Lab Results   Component Value Date     03/14/2024    K 3.5 03/14/2024    CL 93 (L) 03/14/2024    CO2 28 03/14/2024    BUN 57 (H) 03/14/2024    CREATININE 5.1 (H) 03/14/2024    CALCIUM 6.7 (LL) 03/14/2024    ANIONGAP 16 03/14/2024    EGFRNORACEVR 8 (A) 03/14/2024     Lab Results   Component Value Date    ALT 33 03/12/2024    AST 38 03/12/2024    ALKPHOS 89 03/12/2024    BILITOT 0.3 03/12/2024     Lab Results   Component Value Date    INR 1.9 (H) 02/29/2024    INR 1.2 02/29/2024     INR 1.0 08/31/2022     Lab Results   Component Value Date    HGBA1C 6.5 (H) 02/29/2024       Diagnostic Results:    CT Chest 3/6/24:  Large left pleural effusion with associated compressive atelectasis.  Trace right pleural effusion.  Aerated portions of the lungs demonstrate scattered heterogeneous airspace opacities, most notably about the inferior aspect of the right upper lobe, right lung base, and left lung apex.  Findings are nonspecific, and likely relate to pelvic infectious or non infectious inflammatory process.  Correlation is advised.  Thyromegaly with multiple irregular appearing thyroid nodules.  Consider further evaluation with dedicated ultrasound if not previously performed.  Cardiomegaly.       ASSESSMENT & PLAN:     Pneumonia of left lower lobe due to Pseudomonas species  - recent hospitalization as above  - sputum Cx with pan sensitive Pseudomonas   - on Levaquin x 4 weeks, therapy end date 3/31/24    Pleural effusion on left  - s/p thoracentesis 3/6/24   - no malignant cells on cytology review  - see Pulmonary team 4/10/24     Urinary retention  - resolved  - passed voiding trial with UroGYN team    Candidal urinary tract infection  - on Fluconazole     ESRD on peritoneal dialysis  - management per Nephrology team (Dr Borrero)     Multiple thyroid nodules  - previously followed by Endocrine team but patient lost to follow up 11/2022  - had R lobe biopsy 2016- NEG for malignancy, but nodules have grown significantly since that time   - will repeat US and assess next best step   - we have messaged Endocrine team for an appt   - US 4/2/24   -     US Soft Tissue Head Neck; Future; Expected date: 03/25/2024    Other orders  -     traZODone (DESYREL) 50 MG tablet; Take 0.5 tablets (25 mg total) by mouth nightly as needed for Insomnia.  Dispense: 30 tablet; Refill: 11  -     fluconazole (DIFLUCAN) 100 MG tablet; Take 1 tablet (100 mg total) by mouth once daily. for 21 days  Dispense: 21 tablet;  Refill: 0    Patient will be released from hospital follow up clinic.  She will see Dr De Mitchell 6/17/24 to establish primary care.     Instructions for the patient:      Scheduled Follow-up :  Future Appointments   Date Time Provider Department Center   4/2/2024 10:15 AM [facility] US5 [facility] USOUNDO [facility]   4/10/2024  3:30 PM Kelvin Maki MD [facility] PULMSVC [facility]   4/17/2024  9:00 AM Eric Brooke PA-C [facility] UROGYN [facility]   5/23/2024 10:10 AM CARRIE Arechiga MD [facility] OPHTHA [place] [facility]   6/5/2024 12:15 PM EKG, APPT [facility] EKG [facility]   6/5/2024 12:40 PM COORDINATED DEVICE CHECK [facility] ARRHPRO [facility]   6/5/2024  1:30 PM Celeste Rogers NP [facility] ARRHYTH [facility]   6/17/2024  3:00 PM De Mitchell DO [facility]       Post Visit Medication List:     Medication List            Accurate as of March 25, 2024 10:24 AM. If you have any questions, ask your nurse or doctor.                START taking these medications      fluconazole 100 MG tablet  Commonly known as: DIFLUCAN  Take 1 tablet (100 mg total) by mouth once daily. for 21 days  Started by: Ev Jiménez MD     traZODone 50 MG tablet  Commonly known as: DESYREL  Take 0.5 tablets (25 mg total) by mouth nightly as needed for Insomnia.  Started by: Ev Jiménez MD            CHANGE how you take these medications      * estradioL 0.01 % (0.1 mg/gram) vaginal cream  Commonly known as: ESTRACE  PLACE 1 GRAM VAGINALLY EVERY OTHER DAY  What changed: See the new instructions.     * estradioL 2 mg (7.5 mcg /24 hour) vaginal ring  Commonly known as: ESTRING  Place 2 mg vaginally every 3 (three) months.  What changed: Another medication with the same name was changed. Make sure you understand how and when to take each.           * This list has 2 medication(s) that are the same as other medications prescribed for you. Read the directions carefully, and ask your doctor or other care provider to review them with you.        "         CONTINUE taking these medications      albuterol 90 mcg/actuation inhaler  Commonly known as: PROVENTIL/VENTOLIN HFA  Inhale 2 puffs into the lungs every 6 (six) hours as needed for Wheezing. Rescue     albuterol-ipratropium 2.5 mg-0.5 mg/3 mL nebulizer solution  Commonly known as: DUO-NEB  Take 3 mLs by nebulization every 4 (four) hours as needed for Wheezing.     aspirin 81 MG Chew     blood sugar diagnostic Strp  Commonly known as: ACCU-CHEK HECTOR  Uses Accu-Check Hector meter to test BG 4x/day     calcitRIOL 0.5 MCG Cap  Commonly known as: ROCALTROL  Take 1 capsule (0.5 mcg total) by mouth once daily.     cholecalciferol (vitamin D3) 50 mcg (2,000 unit) Tab  Commonly known as: VITAMIN D3     cyanocobalamin 100 MCG tablet  Commonly known as: VITAMIN B-12     epoetin jamaica-epbx 10,000 unit/mL imjection  Commonly known as: RETACRIT  Inject 1 mL (10,000 Units total) into the skin every 30 days.     fluticasone propionate 50 mcg/actuation nasal spray  Commonly known as: FLONASE     furosemide 80 MG tablet  Commonly known as: LASIX  Take 1 tablet (80 mg total) by mouth 2 (two) times a day.     lancets Misc  Commonly known as: ACCU-CHEK SOFTCLIX LANCETS  Uses Accu-Chek Hector meter to test BG 2x/day     LANTUS SOLOSTAR U-100 INSULIN glargine 100 units/mL SubQ pen  Generic drug: insulin  Inject 8 Units into the skin every evening.     levoFLOXacin 250 MG tablet  Commonly known as: LEVAQUIN  Take 1 tablet (250 mg total) by mouth once daily. for 20 days     montelukast 10 mg tablet  Commonly known as: SINGULAIR  TAKE 1 TABLET(10 MG) BY MOUTH EVERY DAY     nitroGLYCERIN 0.4 MG SL tablet  Commonly known as: NITROSTAT     omega-3 fatty acids 500 mg Cap     pen needle, diabetic 31 gauge x 3/16" Ndle  Commonly known as: BD ULTRA-FINE MINI PEN NEEDLE  Use with insulin twice a day.     pravastatin 40 MG tablet  Commonly known as: PRAVACHOL  TAKE 1 TABLET(40 MG) BY MOUTH EVERY DAY     sevelamer carbonate 800 mg " Tab  Commonly known as: RENVELA  Take 2 tablets (1,600 mg total) by mouth 3 (three) times daily.     sodium bicarbonate 650 MG tablet  Take 1 tablet (650 mg total) by mouth 4 (four) times daily.               Where to Get Your Medications        Information about where to get these medications is not yet available    Ask your nurse or doctor about these medications  fluconazole 100 MG tablet  traZODone 50 MG tablet         Signing Physician:  Ev Jiménez MD

## 2024-04-01 LAB — FUNGUS SPEC CULT: NORMAL

## 2024-04-02 ENCOUNTER — HOSPITAL ENCOUNTER (OUTPATIENT)
Dept: RADIOLOGY | Facility: HOSPITAL | Age: 84
Discharge: HOME | End: 2024-04-02
Attending: INTERNAL MEDICINE
Payer: MEDICARE

## 2024-04-02 DIAGNOSIS — E04.2 MULTIPLE THYROID NODULES: ICD-10-CM

## 2024-04-02 PROCEDURE — 76536 US EXAM OF HEAD AND NECK: CPT | Mod: 26,,, | Performed by: RADIOLOGY

## 2024-04-02 PROCEDURE — 76536 US EXAM OF HEAD AND NECK: CPT | Mod: TC

## 2024-04-03 NOTE — ASSESSMENT & PLAN NOTE
Left voicemail for patient to offer an appointment on 4/4/24 at 8am with Philomena Barkley. Left [phone]   Patient has hyponatremia which is controlled,We will aim to correct the sodium by 4-6mEq in 24 hours. We will monitor sodium Daily. The hyponatremia is due to Dehydration/hypovolemia, Heart Failure, and renal insufficiency. We will obtain the following studies: TSH, T4. We will treat the hyponatremia with Fluid restriction of:  1.5 liter per day. The patient's sodium results have been reviewed and are listed below.  Recent Labs   Lab 03/06/24  0314   *

## 2024-04-04 ENCOUNTER — HOSPITAL ENCOUNTER (OUTPATIENT)
Dept: RADIOLOGY | Facility: HOSPITAL | Age: 84
Discharge: HOME | End: 2024-04-04
Attending: INTERNAL MEDICINE
Payer: MEDICARE

## 2024-04-04 DIAGNOSIS — N18.6 ESRD (END STAGE RENAL DISEASE): ICD-10-CM

## 2024-04-04 PROCEDURE — 76770 US EXAM ABDO BACK WALL COMP: CPT | Mod: TC

## 2024-04-04 PROCEDURE — 76770 US EXAM ABDO BACK WALL COMP: CPT | Mod: 26,,, | Performed by: RADIOLOGY

## 2024-04-05 ENCOUNTER — TELEPHONE (OUTPATIENT)
Dept: INTERVENTIONAL RADIOLOGY/VASCULAR | Facility: CLINIC | Age: 84
End: 2024-04-05
Payer: MEDICARE

## 2024-04-09 LAB — FUNGUS SPEC CULT: NORMAL

## 2024-04-11 ENCOUNTER — OFFICE VISIT (OUTPATIENT)
Dept: PULMONOLOGY | Facility: CLINIC | Age: 84
End: 2024-04-11
Payer: MEDICARE

## 2024-04-11 VITALS
DIASTOLIC BLOOD PRESSURE: 82 MMHG | WEIGHT: 126.56 LBS | HEART RATE: 113 BPM | HEIGHT: 63 IN | SYSTOLIC BLOOD PRESSURE: 148 MMHG | BODY MASS INDEX: 22.43 KG/M2 | OXYGEN SATURATION: 99 %

## 2024-04-11 DIAGNOSIS — B44.81 ABPA (ALLERGIC BRONCHOPULMONARY ASPERGILLOSIS): ICD-10-CM

## 2024-04-11 DIAGNOSIS — J18.9 HCAP (HEALTHCARE-ASSOCIATED PNEUMONIA): ICD-10-CM

## 2024-04-11 DIAGNOSIS — I50.43 ACUTE ON CHRONIC COMBINED SYSTOLIC AND DIASTOLIC HEART FAILURE: ICD-10-CM

## 2024-04-11 DIAGNOSIS — R05.9 COUGH, UNSPECIFIED TYPE: ICD-10-CM

## 2024-04-11 DIAGNOSIS — A31.0 MYCOBACTERIUM AVIUM COMPLEX: ICD-10-CM

## 2024-04-11 DIAGNOSIS — R91.8 MULTIPLE LUNG NODULES: ICD-10-CM

## 2024-04-11 DIAGNOSIS — J30.2 SEASONAL ALLERGIES: ICD-10-CM

## 2024-04-11 DIAGNOSIS — J44.9 CHRONIC OBSTRUCTIVE PULMONARY DISEASE, UNSPECIFIED COPD TYPE: ICD-10-CM

## 2024-04-11 DIAGNOSIS — J45.50 SEVERE PERSISTENT CHRONIC ASTHMA WITHOUT COMPLICATION: Primary | ICD-10-CM

## 2024-04-11 DIAGNOSIS — J90 PLEURAL EFFUSION ON LEFT: ICD-10-CM

## 2024-04-11 DIAGNOSIS — J45.30 MILD PERSISTENT CHRONIC ASTHMA WITHOUT COMPLICATION: ICD-10-CM

## 2024-04-11 PROCEDURE — 1159F MED LIST DOCD IN RCRD: CPT | Mod: CPTII,S$GLB,, | Performed by: INTERNAL MEDICINE

## 2024-04-11 PROCEDURE — 1160F RVW MEDS BY RX/DR IN RCRD: CPT | Mod: CPTII,S$GLB,, | Performed by: INTERNAL MEDICINE

## 2024-04-11 PROCEDURE — 3079F DIAST BP 80-89 MM HG: CPT | Mod: CPTII,S$GLB,, | Performed by: INTERNAL MEDICINE

## 2024-04-11 PROCEDURE — 3077F SYST BP >= 140 MM HG: CPT | Mod: CPTII,S$GLB,, | Performed by: INTERNAL MEDICINE

## 2024-04-11 PROCEDURE — 99214 OFFICE O/P EST MOD 30 MIN: CPT | Mod: S$GLB,,, | Performed by: INTERNAL MEDICINE

## 2024-04-11 PROCEDURE — 1101F PT FALLS ASSESS-DOCD LE1/YR: CPT | Mod: CPTII,S$GLB,, | Performed by: INTERNAL MEDICINE

## 2024-04-11 PROCEDURE — 99999 PR PBB SHADOW E&M-EST. PATIENT-LVL V: CPT | Mod: PBBFAC,,, | Performed by: INTERNAL MEDICINE

## 2024-04-11 PROCEDURE — 3288F FALL RISK ASSESSMENT DOCD: CPT | Mod: CPTII,S$GLB,, | Performed by: INTERNAL MEDICINE

## 2024-04-11 RX ORDER — VALSARTAN 320 MG/1
320 TABLET ORAL
COMMUNITY
Start: 2024-03-24 | End: 2024-04-20

## 2024-04-11 RX ORDER — IPRATROPIUM BROMIDE AND ALBUTEROL SULFATE 2.5; .5 MG/3ML; MG/3ML
3 SOLUTION RESPIRATORY (INHALATION) EVERY 4 HOURS PRN
Qty: 270 ML | Refills: 12 | Status: SHIPPED | OUTPATIENT
Start: 2024-04-11

## 2024-04-11 RX ORDER — ERGOCALCIFEROL 1.25 MG/1
50000 CAPSULE ORAL
Status: ON HOLD | COMMUNITY
Start: 2024-04-01 | End: 2024-04-23 | Stop reason: HOSPADM

## 2024-04-11 RX ORDER — POTASSIUM CHLORIDE 20 MEQ/1
20 TABLET, EXTENDED RELEASE ORAL DAILY
COMMUNITY
Start: 2024-04-01

## 2024-04-11 NOTE — PROGRESS NOTES
Subjective:       Patient ID: Myesha Quinones is a 83 y.o. female.    Chief Complaint: Shortness of Breath and Pneumonia    HPI   Myesha Quinones has a past medical history of CAD, combined systolic and diastolic heart failure, LBBB, biventricular ICD, CKD4, IDDM, breast cancer, HTN, HLD, severe persistent asthma, HLD, who is here by virtual visit for continued shortness of breath, asthmatic symptoms, congestion and abnormal CT thorax.  Currently taking Spiriva, Albuterol, Duonebs, Singulair, Flonase.      Tobacco/vape: never  Occupation: stayed at home  Exertional capacity: walks with a rolling walker to the parking garage  Prior lung disease: ABPA, asthma, MAC infection  Sputum production: no longer making sputum  Allergies/sinusitis: taking flonase.  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  Prior hospitalization/intubation: July 2022.     Today she is doing ok, but still having a little bit of breathing issues.  She was doing well until about 1 month ago when she was found unresponsive and brought to the ER.  She was treated for pseudomonas pneumonia and also underwent a thoracentesis at that time.  She has been compliant with her PD, and seems to be doing well from that standpoint.  She is dramatically improved from her hospitalization but still is not yet at her baseline.    Review of Systems   Constitutional:  Negative for fever, chills, activity change, appetite change and night sweats.   HENT:  Negative for postnasal drip, rhinorrhea, sinus pressure and congestion.    Eyes: Negative.    Respiratory:  Negative for cough, sputum production, shortness of breath, wheezing, asthma nighttime symptoms, dyspnea on extertion and use of rescue inhaler.    Cardiovascular:  Negative for chest pain, palpitations and leg  "swelling.   Genitourinary: Negative.    Endocrine: endocrine negative    Musculoskeletal: Negative.    Skin: Negative.    Gastrointestinal:  Negative for nausea, vomiting, abdominal pain, abdominal distention and acid reflux.   Neurological: Negative.    Psychiatric/Behavioral: Negative.         Objective:       Vitals:    04/11/24 1513   BP: (!) 148/82   BP Location: Left arm   Patient Position: Sitting   Pulse: (!) 113   SpO2: 99%   Weight: 57.4 kg (126 lb 8.7 oz)   Height: 5' 3" (1.6 m)         Physical Exam   Constitutional: She is oriented to person, place, and time. She appears well-developed and well-nourished. She appears not cachectic. No distress.   HENT:   Head: Normocephalic.   Neck: No JVD present.   Cardiovascular: Normal rate, regular rhythm and normal heart sounds. Exam reveals no gallop and no friction rub.   No murmur heard.  Pulmonary/Chest: Normal expansion and symmetric chest wall expansion. No respiratory distress. She has no decreased breath sounds. She has no wheezes. She has no rhonchi. She has no rales.    Normal work of breathing, no wheezing.  Diminished left lower lobe breath sounds Negative for egophony. Negative for tactile fremitus.   Abdominal: Soft. Bowel sounds are normal. She exhibits no distension.   Musculoskeletal:         General: No edema. Normal range of motion.      Cervical back: Normal range of motion.   Neurological: She is alert and oriented to person, place, and time.   Skin: Skin is warm and dry. She is not diaphoretic.   Psychiatric: She has a normal mood and affect. Her behavior is normal. Judgment and thought content normal.        Personal Diagnostic Review    3/12/2024 Chest x-ray   Interstitial findings suggest edema noting left pleural effusion. More clear in comparison to previous studies.  Left pelural effusion improved from 3/6/24 following thoracentesis.     3/6/24 CT Chest  Large left pleural effusion with associated compressive atelectasis.  Trace right " pleural effusion.  Aerated portions of the lungs demonstrate scattered heterogeneous airspace opacities, most notably about the inferior aspect of the right upper lobe, right lung base, and left lung apex. Thyromegaly with multiple irregular appearing thyroid nodules.    Cardiomegaly.    TTE 3/1/2024    Left Ventricle: The left ventricle is normal in size. There is concentric remodeling. Septal motion is consistent with pacing. There is reduced systolic function. Biplane (2D) method of discs ejection fraction is 52%.    Right Ventricle: Normal right ventricular cavity size. Systolic function is borderline low. TAPSE is 1.68 cm. Pacemaker lead present in the ventricle.    Left Atrium: Left atrium is moderately dilated.    Right Atrium: Lead present in the right atrium.    Aortic Valve: There is mild aortic valve sclerosis. Mildly restricted motion. There is mild stenosis. Aortic valve area by VTI is 1.55 cm². Aortic valve peak velocity is 1.45 m/s. Mean gradient is 5 mmHg. The dimensionless index is 0.57.    Mitral Valve: There is moderate regurgitation.    Tricuspid Valve: There is moderate regurgitation.    Pulmonary Artery: The estimated pulmonary artery systolic pressure is 69 mmHg.    IVC/SVC: Intermediate venous pressure at 8 mmHg.    Pericardium: Left pleural effusion.    CT Throax 7/31/23  overall improvement in her bilateral scattered pulmonary nodules, improvement in bandlike/rounded atelectasis of the bilateral lower lobes, and less volume of left sided pleural effusion (pending formal radiology read)     CT Chest 2/9/2023  Mild to moderate-sized dependent left pleural effusion, improved from previous CT.  RUL and RML tree-in-bud that is stable and improving, likely from known MAC infection.  Subsegmental atelectasis in lingula. Bandlike atelectasis in the bases bilaterally.  Overall, improved from previous studies.    Chest x-ray 1/31/2023  Left chest multi lead pacer device.  Cardiac silhouette  "unchanged.  Chronic interstitial coarsening.  Continued opacification at the left lower lung which could relate to component of underlying consolidation and/or effusion.  Right-sided pleural effusion and/or thickening at the costophrenic margin, more conspicuous.  Vague ill-defined opacity at the peripheral right mid lung, more conspicuous from prior and possibly pleural based.  Suggest correlation with dedicated cross-sectional CT.  No gross pneumothorax.    CT thorax 2/26/2022:  Obliteration of the right bronchus intermedius and bilateral lower lobes basal segments bronchi possibly by mucous, aspiration or pneumonia.  There is complete volume loss of the middle lobe and patchy subsegmental atelectasis or airspace disease of the basal segments of the bilateral lower lobes right more than left.  Consider aspiration or pneumonia.  Enlarged precarinal and sub carinal nodes could be reactive to the underlying inflammatory process, less likely a neoplastic process not excluded.  Right upper lobe pleural base nodule, For a solid nodule >8 mm, Fleischner Society 2017 guidelines recommend considering follow-up CT at 3 months.  Significant coronary artery calcifications.  Bilateral small pleural effusion left more than right.  Bilateral benign adrenal adenoma.  Right thyroid lobe nodule slightly larger compared to the prior chest CT.  Ultrasound could be done if clinically warranted.    PFTs 10/2020:  FEV1/FVC - 50%  FEV1 - 0.85L (50%)  FVC - 1.71L (76%)  TLC - 2.95L (66%)  DLCO - 56%  Bronchodilator - no significant response    6MWT 10/2020:  No significant desaturation.  Walked 200feet with initial sat 97% and drop to 93% with exertion.  Recovered to 97% on room air.         4/11/2024     3:13 PM 3/25/2024     9:39 AM 3/21/2024     1:09 PM 3/14/2024    12:17 PM 3/14/2024     8:55 AM 3/14/2024     8:30 AM 3/14/2024     4:41 AM   Pulmonary Function Tests   SpO2 99 % 93 %  94 % 96 % 93 % 98 %   Height 5' 3" (1.6 m)  5' 3" " (1.6 m)       Weight 57.4 kg (126 lb 8.7 oz) 57.6 kg (126 lb 15.8 oz) 58.4 kg (128 lb 12 oz)    58.4 kg (128 lb 12 oz)   BMI (Calculated) 22.4  22.8    22.8         Assessment:       1. Severe persistent chronic asthma without complication    2. HCAP (healthcare-associated pneumonia)    3. Chronic obstructive pulmonary disease, unspecified COPD type    4. Cough, unspecified type    5. Multiple lung nodules    6. Seasonal allergies    7. Mild persistent chronic asthma without complication    8. Pleural effusion on left    9. Acute on chronic combined systolic and diastolic heart failure    10. ABPA (allergic bronchopulmonary aspergillosis)    11. Mycobacterium avium complex              Outpatient Encounter Medications as of 4/11/2024   Medication Sig Dispense Refill    albuterol (PROVENTIL/VENTOLIN HFA) 90 mcg/actuation inhaler Inhale 2 puffs into the lungs every 6 (six) hours as needed for Wheezing. Rescue 18 g 12    aspirin 81 MG Chew Take 81 mg by mouth once daily.      blood sugar diagnostic (ACCU-CHEK HECTOR) Strp Uses Accu-Check Hector meter to test BG 4x/day 400 strip 6    calcitRIOL (ROCALTROL) 0.5 MCG Cap Take 1 capsule (0.5 mcg total) by mouth once daily. 30 capsule 11    cholecalciferol, vitamin D3, (VITAMIN D3) 50 mcg (2,000 unit) Tab Take 1 tablet by mouth once daily.       cyanocobalamin (VITAMIN B-12) 100 MCG tablet Take 100 mcg by mouth once daily.      epoetin [place]-epbx (RETACRIT) 10,000 unit/mL imjection Inject 1 mL (10,000 Units total) into the skin every 30 days. 1 mL 11    ergocalciferol (ERGOCALCIFEROL) 50,000 unit Cap Take 50,000 Units by mouth every 7 days.      estradioL (ESTRACE) 0.01 % (0.1 mg/gram) vaginal cream PLACE 1 GRAM VAGINALLY EVERY OTHER DAY (Patient taking differently: Place 1 g vaginally every other day.) 42.5 g 3    estradioL (ESTRING) 2 mg (7.5 mcg /24 hour) vaginal ring Place 2 mg vaginally every 3 (three) months. 1 each 3    fluconazole (DIFLUCAN) 100 MG tablet Take 1 tablet  "(100 mg total) by mouth once daily. for 21 days 21 tablet 0    fluticasone propionate (FLONASE) 50 mcg/actuation nasal spray 1 spray by Each Nostril route daily as needed.      furosemide (LASIX) 80 MG tablet Take 1 tablet (80 mg total) by mouth 2 (two) times a day. 60 tablet 3    insulin (LANTUS SOLOSTAR U-100 INSULIN) glargine 100 units/mL SubQ pen Inject 8 Units into the skin every evening.  0    lancets (ACCU-CHEK SOFTCLIX LANCETS) Misc Uses Accu-Chek Angelica meter to test BG 2x/day 400 each 6    montelukast (SINGULAIR) 10 mg tablet TAKE 1 TABLET(10 MG) BY MOUTH EVERY DAY 90 tablet 1    nitroGLYCERIN (NITROSTAT) 0.4 MG SL tablet Place 0.4 mg under the tongue every 5 (five) minutes as needed for Chest pain.       omega-3 fatty acids 500 mg Cap Take 1 capsule by mouth Daily.      pen needle, diabetic (BD ULTRA-FINE MINI PEN NEEDLE) 31 gauge x 3/16" Ndle Use with insulin twice a day. 400 each 3    potassium chloride SA (K-DUR,KLOR-CON) 20 MEQ tablet Take 40 mEq by mouth.      pravastatin (PRAVACHOL) 40 MG tablet TAKE 1 TABLET(40 MG) BY MOUTH EVERY DAY 90 tablet 1    sevelamer carbonate (RENVELA) 800 mg Tab Take 2 tablets (1,600 mg total) by mouth 3 (three) times daily. 180 tablet 3    sodium bicarbonate 650 MG tablet Take 1 tablet (650 mg total) by mouth 4 (four) times daily. 120 tablet 3    traZODone (DESYREL) 50 MG tablet Take 0.5 tablets (25 mg total) by mouth nightly as needed for Insomnia. 30 tablet 11    valsartan (DIOVAN) 320 MG tablet Take 320 mg by mouth.      [DISCONTINUED] albuterol-ipratropium (DUO-NEB) 2.5 mg-0.5 mg/3 mL nebulizer solution Take 3 mLs by nebulization every 4 (four) hours as needed for Wheezing. 270 mL 12    albuterol-ipratropium (DUO-NEB) 2.5 mg-0.5 mg/3 mL nebulizer solution Take 3 mLs by nebulization every 4 (four) hours as needed for Wheezing. 270 mL 12     No facility-administered encounter medications on file as of 4/11/2024.     Orders Placed This Encounter   Procedures    CT Chest " Without Contrast     Standing Status:   Future     Standing Expiration Date:   4/11/2025     Order Specific Question:   May the Radiologist modify the order per protocol to meet the clinical needs of the patient?     Answer:   Yes       Plan:       Problem List Items Addressed This Visit          ENT    Seasonal allergies    Overview     Continue flonase              Pulmonary    Asthma, chronic    Overview     - continue Albuterol, Duonebs, Singulair, Flonase.  No longer on maintenance inhalers.  stable         Multiple lung nodules    Overview     Most recent CT was performed during her hospitalization.  It showed a left sided pleural effusion and scattered ground-glass opacities bilaterally that were suggestive of her Pseudomonas pneumonia.  - Will repeat CT thorax in 6 months from last CT scan in March.  This would be for re-evaluation of tree-in-bud opacities, mucous impaction, pleural effusions, and bandlike atelectasis.           COPD (chronic obstructive pulmonary disease)    Pleural effusion on left    Current Assessment & Plan     Pleural fluid again removed in March of 2024.  These studies were exudative with negative cytology.  She has issues with volume management secondary to heart failure, and renal failure.  Following her thoracentesis her pleural fluid collection decreased in size.  Repeat chest x-ray a week later showed continued decrease in size of pleural fluid collection.  - continue PD, BP, and heart failure control as current.  - we will repeat CT scan in 6 months from her previous study.         Severe persistent chronic asthma without complication - Primary       Cardiac/Vascular    Acute on chronic combined systolic and diastolic heart failure    Overview     - continue optimization of BP and heart failure as possible.  Since starting PD, this seems to be much better controlled.            ID    ABPA (allergic bronchopulmonary aspergillosis)    Overview     Unable to take steroids due to  hyperglycemia and recurrent heart failure.  Tried voriconazole with ID, but was unable to tolerate.  Continue utilizing sputum clearance maneuvers and inhaler therapy for her asthma control as this is providing her with good effect.          Mycobacterium avium complex    Overview     Doing well with airway clearance maneuvers.   She is not having weight loss of fevers/night sweats.  We will monitor repeat CT scan in 6 months from previous study.  - she is clear to auscultation and having no sputum production at this time.  She is doing well with PRN duonebs.  I advised her that if her mucous burden returns, that I will provide her with 3% saline nebs to aid with clearance.           Other Visit Diagnoses       HCAP (healthcare-associated pneumonia)        Cough, unspecified type              5 month follow up or sooner PRN.  follow up CT at that time.    Kelvin Maki MD  [facility]

## 2024-04-11 NOTE — ASSESSMENT & PLAN NOTE
Pleural fluid again removed in March of 2024.  These studies were exudative with negative cytology.  She has issues with volume management secondary to heart failure, and renal failure.  Following her thoracentesis her pleural fluid collection decreased in size.  Repeat chest x-ray a week later showed continued decrease in size of pleural fluid collection.  - continue PD, BP, and heart failure control as current.  - we will repeat CT scan in 6 months from her previous study.

## 2024-04-19 ENCOUNTER — EXTERNAL HOME HEALTH (OUTPATIENT)
Dept: HOME HEALTH SERVICES | Facility: HOSPITAL | Age: 84
End: 2024-04-19
Payer: MEDICARE

## 2024-04-20 ENCOUNTER — HOSPITAL ENCOUNTER (INPATIENT)
Facility: HOSPITAL | Age: 84
LOS: 3 days | Discharge: HOSPICE | DRG: 871 | End: 2024-04-24
Attending: EMERGENCY MEDICINE | Admitting: INTERNAL MEDICINE
Payer: MEDICARE

## 2024-04-20 ENCOUNTER — PATIENT MESSAGE (OUTPATIENT)
Dept: PULMONOLOGY | Facility: CLINIC | Age: 84
End: 2024-04-20
Payer: MEDICARE

## 2024-04-20 DIAGNOSIS — R79.89 ELEVATED TROPONIN: ICD-10-CM

## 2024-04-20 DIAGNOSIS — I63.9 STROKE: ICD-10-CM

## 2024-04-20 DIAGNOSIS — Z99.2 ESRD ON PERITONEAL DIALYSIS: Primary | ICD-10-CM

## 2024-04-20 DIAGNOSIS — R29.818 FOCAL NEUROLOGICAL DEFICIT: ICD-10-CM

## 2024-04-20 DIAGNOSIS — R53.1 WEAKNESS: ICD-10-CM

## 2024-04-20 DIAGNOSIS — N18.6 ESRD ON PERITONEAL DIALYSIS: Primary | ICD-10-CM

## 2024-04-20 DIAGNOSIS — R07.9 CHEST PAIN: ICD-10-CM

## 2024-04-20 DIAGNOSIS — A41.9 SEPSIS: ICD-10-CM

## 2024-04-20 DIAGNOSIS — R29.818 ACUTE FOCAL NEUROLOGICAL DEFICIT: ICD-10-CM

## 2024-04-20 PROBLEM — R11.2 NAUSEA AND VOMITING: Status: ACTIVE | Noted: 2024-04-20

## 2024-04-20 LAB
ALBUMIN SERPL BCP-MCNC: 2.4 G/DL (ref 3.5–5.2)
ALLENS TEST: ABNORMAL
ALLENS TEST: NORMAL
ALLENS TEST: YES
ALP SERPL-CCNC: 166 U/L (ref 55–135)
ALT SERPL W/O P-5'-P-CCNC: 14 U/L (ref 10–44)
ANION GAP SERPL CALC-SCNC: 17 MMOL/L (ref 8–16)
AORTIC ROOT ANNULUS: 2.55 CM
APPEARANCE FLD: CLEAR
AST SERPL-CCNC: 17 U/L (ref 10–40)
AV INDEX (PROSTH): 0.49
AV MEAN GRADIENT: 5 MMHG
AV PEAK GRADIENT: 9 MMHG
AV VALVE AREA BY VELOCITY RATIO: 1.65 CM²
AV VALVE AREA: 1.56 CM²
AV VELOCITY RATIO: 0.52
BACTERIA #/AREA URNS HPF: NORMAL /HPF
BASOPHILS # BLD AUTO: 0.07 K/UL (ref 0–0.2)
BASOPHILS NFR BLD: 0.7 % (ref 0–1.9)
BASOPHILS NFR FLD MANUAL: 1 %
BILIRUB SERPL-MCNC: 0.2 MG/DL (ref 0.1–1)
BILIRUB UR QL STRIP: NEGATIVE
BODY FLD TYPE: NORMAL
BSA FOR ECHO PROCEDURE: 1.57 M2
BUN SERPL-MCNC: 67 MG/DL (ref 8–23)
CALCIUM SERPL-MCNC: 8.2 MG/DL (ref 8.7–10.5)
CHLORIDE SERPL-SCNC: 99 MMOL/L (ref 95–110)
CHOLEST SERPL-MCNC: 100 MG/DL (ref 120–199)
CHOLEST/HDLC SERPL: 1.9 {RATIO} (ref 2–5)
CLARITY UR: CLEAR
CO2 SERPL-SCNC: 21 MMOL/L (ref 23–29)
COLOR FLD: COLORLESS
COLOR UR: YELLOW
CREAT SERPL-MCNC: 3.5 MG/DL (ref 0.5–1.4)
CREAT SERPL-MCNC: 3.6 MG/DL (ref 0.5–1.4)
CV ECHO LV RWT: 0.62 CM
DELSYS: ABNORMAL
DELSYS: NORMAL
DIFFERENTIAL METHOD BLD: ABNORMAL
DOP CALC AO PEAK VEL: 1.53 M/S
DOP CALC AO VTI: 29.7 CM
DOP CALC LVOT AREA: 3.2 CM2
DOP CALC LVOT DIAMETER: 2.02 CM
DOP CALC LVOT PEAK VEL: 0.79 M/S
DOP CALC LVOT STROKE VOLUME: 46.45 CM3
DOP CALCLVOT PEAK VEL VTI: 14.5 CM
E/E' RATIO: 19.88 M/S
ECHO LV POSTERIOR WALL: 1.38 CM (ref 0.6–1.1)
EOSINOPHIL # BLD AUTO: 0.3 K/UL (ref 0–0.5)
EOSINOPHIL NFR BLD: 3.2 % (ref 0–8)
EOSINOPHIL NFR FLD MANUAL: 1 %
ERYTHROCYTE [DISTWIDTH] IN BLOOD BY AUTOMATED COUNT: 14.1 % (ref 11.5–14.5)
EST. GFR  (NO RACE VARIABLE): 12 ML/MIN/1.73 M^2
ESTIMATED AVG GLUCOSE: 126 MG/DL (ref 68–131)
FIO2: 21 %
FRACTIONAL SHORTENING: 20 % (ref 28–44)
GLUCOSE SERPL-MCNC: 194 MG/DL (ref 70–110)
GLUCOSE UR QL STRIP: ABNORMAL
GRAM STN SPEC: NORMAL
GRAM STN SPEC: NORMAL
HBA1C MFR BLD: 6 % (ref 4–5.6)
HCT VFR BLD AUTO: 34.8 % (ref 37–48.5)
HDLC SERPL-MCNC: 54 MG/DL (ref 40–75)
HDLC SERPL: 54 % (ref 20–50)
HGB BLD-MCNC: 11.1 G/DL (ref 12–16)
HGB UR QL STRIP: NEGATIVE
HYALINE CASTS #/AREA URNS LPF: 0 /LPF
IMM GRANULOCYTES # BLD AUTO: 0.07 K/UL (ref 0–0.04)
IMM GRANULOCYTES NFR BLD AUTO: 0.7 % (ref 0–0.5)
INR PPP: 1.1 (ref 0.8–1.2)
INTERVENTRICULAR SEPTUM: 1.48 CM (ref 0.6–1.1)
IVC DIAMETER: 1.89 CM
IVRT: 53.28 MSEC
KETONES UR QL STRIP: NEGATIVE
LA MAJOR: 5.89 CM
LA MINOR: 5.7 CM
LA WIDTH: 4.2 CM
LACTATE SERPL-SCNC: 1.1 MMOL/L (ref 0.5–2.2)
LACTATE SERPL-SCNC: 2.5 MMOL/L (ref 0.5–2.2)
LDLC SERPL CALC-MCNC: 28.4 MG/DL (ref 63–159)
LEFT ATRIUM SIZE: 4.42 CM
LEFT ATRIUM VOLUME INDEX MOD: 45.6 ML/M2
LEFT ATRIUM VOLUME INDEX: 58.2 ML/M2
LEFT ATRIUM VOLUME MOD: 71.53 CM3
LEFT ATRIUM VOLUME: 91.42 CM3
LEFT INTERNAL DIMENSION IN SYSTOLE: 3.54 CM (ref 2.1–4)
LEFT VENTRICLE DIASTOLIC VOLUME INDEX: 57.48 ML/M2
LEFT VENTRICLE DIASTOLIC VOLUME: 90.24 ML
LEFT VENTRICLE MASS INDEX: 161 G/M2
LEFT VENTRICLE SYSTOLIC VOLUME INDEX: 33.3 ML/M2
LEFT VENTRICLE SYSTOLIC VOLUME: 52.28 ML
LEFT VENTRICULAR INTERNAL DIMENSION IN DIASTOLE: 4.45 CM (ref 3.5–6)
LEFT VENTRICULAR MASS: 252.28 G
LEUKOCYTE ESTERASE UR QL STRIP: NEGATIVE
LV LATERAL E/E' RATIO: 19.88 M/S
LV SEPTAL E/E' RATIO: 19.88 M/S
LVOT MG: 1.14 MMHG
LVOT MV: 0.52 CM/S
LYMPHOCYTES # BLD AUTO: 1.1 K/UL (ref 1–4.8)
LYMPHOCYTES NFR BLD: 10.6 % (ref 18–48)
LYMPHOCYTES NFR FLD MANUAL: 28 %
MAGNESIUM SERPL-MCNC: 1.8 MG/DL (ref 1.6–2.6)
MCH RBC QN AUTO: 30.3 PG (ref 27–31)
MCHC RBC AUTO-ENTMCNC: 31.9 G/DL (ref 32–36)
MCV RBC AUTO: 95 FL (ref 82–98)
MESOTHL CELL NFR FLD MANUAL: 30 %
MICROSCOPIC COMMENT: NORMAL
MONOCYTES # BLD AUTO: 0.6 K/UL (ref 0.3–1)
MONOCYTES NFR BLD: 5.5 % (ref 4–15)
MONOS+MACROS NFR FLD MANUAL: 34 %
MV PEAK E VEL: 1.59 M/S
NEUTROPHILS # BLD AUTO: 8.1 K/UL (ref 1.8–7.7)
NEUTROPHILS NFR BLD: 79.3 % (ref 38–73)
NEUTROPHILS NFR FLD MANUAL: 6 %
NITRITE UR QL STRIP: NEGATIVE
NONHDLC SERPL-MCNC: 46 MG/DL
NRBC BLD-RTO: 0 /100 WBC
OHS CV RV/LV RATIO: 0.71 CM
OHS LV EJECTION FRACTION SIMPSONS BIPLANE MOD: 54 %
PCO2 BLDA: 37.4 MMHG (ref 35–45)
PH SMN: 7.36 [PH] (ref 7.35–7.45)
PH UR STRIP: 7 [PH] (ref 5–8)
PHOSPHATE SERPL-MCNC: 4.1 MG/DL (ref 2.7–4.5)
PISA MRMAX VEL: 6.04 M/S
PISA TR MAX VEL: 6.62 M/S
PLATELET # BLD AUTO: 248 K/UL (ref 150–450)
PMV BLD AUTO: 10.9 FL (ref 9.2–12.9)
PO2 BLDA: 57.8 MMHG (ref 80–100)
POC BASE DEFICIT: -3.9 MMOL/L (ref -2–2)
POC HCO3: 21.1 MMOL/L (ref 24–28)
POC PERFORMED BY: ABNORMAL
POC PTINR: 1.1 (ref 0.9–1.2)
POC SATURATED O2: 90.2 % (ref 95–100)
POCT GLUCOSE: 191 MG/DL (ref 70–110)
POCT GLUCOSE: 249 MG/DL (ref 70–110)
POCT GLUCOSE: 277 MG/DL (ref 70–110)
POTASSIUM SERPL-SCNC: 4.3 MMOL/L (ref 3.5–5.1)
PROT SERPL-MCNC: 6.5 G/DL (ref 6–8.4)
PROT UR QL STRIP: ABNORMAL
PROTHROMBIN TIME: 11.6 SEC (ref 9–12.5)
PULM VEIN S/D RATIO: 0.3
PV PEAK D VEL: 0.9 M/S
PV PEAK S VEL: 0.27 M/S
RA MAJOR: 5.07 CM
RA PRESSURE ESTIMATED: 3 MMHG
RA WIDTH: 3.27 CM
RBC # BLD AUTO: 3.66 M/UL (ref 4–5.4)
RBC #/AREA URNS HPF: 0 /HPF (ref 0–4)
RIGHT VENTRICULAR END-DIASTOLIC DIMENSION: 3.14 CM
RV TB RVSP: 10 MMHG
RV TISSUE DOPPLER FREE WALL SYSTOLIC VELOCITY 1 (APICAL 4 CHAMBER VIEW): 21.64 CM/S
SAMPLE: ABNORMAL
SAMPLE: NORMAL
SITE: ABNORMAL
SITE: NORMAL
SODIUM SERPL-SCNC: 137 MMOL/L (ref 136–145)
SP GR UR STRIP: 1.03 (ref 1–1.03)
SPECIMEN SOURCE: ABNORMAL
STJ: 2.16 CM
TDI LATERAL: 0.08 M/S
TDI SEPTAL: 0.08 M/S
TDI: 0.08 M/S
TR MAX PG: 175 MMHG
TRICUSPID ANNULAR PLANE SYSTOLIC EXCURSION: 2.08 CM
TRIGL SERPL-MCNC: 88 MG/DL (ref 30–150)
TROPONIN I SERPL DL<=0.01 NG/ML-MCNC: 0.06 NG/ML (ref 0–0.03)
TROPONIN I SERPL DL<=0.01 NG/ML-MCNC: 0.1 NG/ML (ref 0–0.03)
TROPONIN I SERPL DL<=0.01 NG/ML-MCNC: 0.21 NG/ML (ref 0–0.03)
TSH SERPL DL<=0.005 MIU/L-ACNC: 1 UIU/ML (ref 0.4–4)
TV REST PULMONARY ARTERY PRESSURE: 178 MMHG
URN SPEC COLLECT METH UR: ABNORMAL
UROBILINOGEN UR STRIP-ACNC: NEGATIVE EU/DL
WBC # BLD AUTO: 10.23 K/UL (ref 3.9–12.7)
WBC # FLD: 42 /CU MM
WBC #/AREA URNS HPF: 1 /HPF (ref 0–5)
Z-SCORE OF LEFT VENTRICULAR DIMENSION IN END DIASTOLE: -0.14
Z-SCORE OF LEFT VENTRICULAR DIMENSION IN END SYSTOLE: 1.86

## 2024-04-20 PROCEDURE — 99900035 HC TECH TIME PER 15 MIN (STAT)

## 2024-04-20 PROCEDURE — 93010 ELECTROCARDIOGRAM REPORT: CPT | Mod: ,,, | Performed by: INTERNAL MEDICINE

## 2024-04-20 PROCEDURE — 63600175 PHARM REV CODE 636 W HCPCS: Performed by: EMERGENCY MEDICINE

## 2024-04-20 PROCEDURE — 36415 COLL VENOUS BLD VENIPUNCTURE: CPT | Mod: XB | Performed by: INTERNAL MEDICINE

## 2024-04-20 PROCEDURE — 83036 HEMOGLOBIN GLYCOSYLATED A1C: CPT | Performed by: NURSE PRACTITIONER

## 2024-04-20 PROCEDURE — 83605 ASSAY OF LACTIC ACID: CPT | Mod: 91 | Performed by: INTERNAL MEDICINE

## 2024-04-20 PROCEDURE — 84484 ASSAY OF TROPONIN QUANT: CPT | Mod: 91 | Performed by: EMERGENCY MEDICINE

## 2024-04-20 PROCEDURE — 25000003 PHARM REV CODE 250: Performed by: NURSE PRACTITIONER

## 2024-04-20 PROCEDURE — 82803 BLOOD GASES ANY COMBINATION: CPT

## 2024-04-20 PROCEDURE — G0508 CRIT CARE TELEHEA CONSULT 60: HCPCS | Mod: 95,,, | Performed by: STUDENT IN AN ORGANIZED HEALTH CARE EDUCATION/TRAINING PROGRAM

## 2024-04-20 PROCEDURE — 85610 PROTHROMBIN TIME: CPT | Performed by: EMERGENCY MEDICINE

## 2024-04-20 PROCEDURE — 85025 COMPLETE CBC W/AUTO DIFF WBC: CPT | Performed by: EMERGENCY MEDICINE

## 2024-04-20 PROCEDURE — 96375 TX/PRO/DX INJ NEW DRUG ADDON: CPT

## 2024-04-20 PROCEDURE — 83735 ASSAY OF MAGNESIUM: CPT | Performed by: EMERGENCY MEDICINE

## 2024-04-20 PROCEDURE — 85610 PROTHROMBIN TIME: CPT

## 2024-04-20 PROCEDURE — 27000221 HC OXYGEN, UP TO 24 HOURS

## 2024-04-20 PROCEDURE — 80053 COMPREHEN METABOLIC PANEL: CPT | Performed by: EMERGENCY MEDICINE

## 2024-04-20 PROCEDURE — 63600175 PHARM REV CODE 636 W HCPCS: Performed by: STUDENT IN AN ORGANIZED HEALTH CARE EDUCATION/TRAINING PROGRAM

## 2024-04-20 PROCEDURE — 87040 BLOOD CULTURE FOR BACTERIA: CPT | Performed by: NURSE PRACTITIONER

## 2024-04-20 PROCEDURE — G0378 HOSPITAL OBSERVATION PER HR: HCPCS

## 2024-04-20 PROCEDURE — 51701 INSERT BLADDER CATHETER: CPT

## 2024-04-20 PROCEDURE — 96372 THER/PROPH/DIAG INJ SC/IM: CPT | Performed by: NURSE PRACTITIONER

## 2024-04-20 PROCEDURE — 87205 SMEAR GRAM STAIN: CPT | Performed by: INTERNAL MEDICINE

## 2024-04-20 PROCEDURE — 84100 ASSAY OF PHOSPHORUS: CPT | Performed by: EMERGENCY MEDICINE

## 2024-04-20 PROCEDURE — 63600175 PHARM REV CODE 636 W HCPCS: Performed by: NURSE PRACTITIONER

## 2024-04-20 PROCEDURE — 90945 DIALYSIS ONE EVALUATION: CPT

## 2024-04-20 PROCEDURE — 93005 ELECTROCARDIOGRAM TRACING: CPT

## 2024-04-20 PROCEDURE — 81000 URINALYSIS NONAUTO W/SCOPE: CPT | Performed by: EMERGENCY MEDICINE

## 2024-04-20 PROCEDURE — 96366 THER/PROPH/DIAG IV INF ADDON: CPT

## 2024-04-20 PROCEDURE — 82565 ASSAY OF CREATININE: CPT

## 2024-04-20 PROCEDURE — 25000003 PHARM REV CODE 250: Performed by: STUDENT IN AN ORGANIZED HEALTH CARE EDUCATION/TRAINING PROGRAM

## 2024-04-20 PROCEDURE — 94761 N-INVAS EAR/PLS OXIMETRY MLT: CPT | Mod: XB

## 2024-04-20 PROCEDURE — 96367 TX/PROPH/DG ADDL SEQ IV INF: CPT

## 2024-04-20 PROCEDURE — 25000003 PHARM REV CODE 250: Performed by: INTERNAL MEDICINE

## 2024-04-20 PROCEDURE — 87070 CULTURE OTHR SPECIMN AEROBIC: CPT | Performed by: INTERNAL MEDICINE

## 2024-04-20 PROCEDURE — 96376 TX/PRO/DX INJ SAME DRUG ADON: CPT

## 2024-04-20 PROCEDURE — 83605 ASSAY OF LACTIC ACID: CPT | Performed by: NURSE PRACTITIONER

## 2024-04-20 PROCEDURE — 80061 LIPID PANEL: CPT | Performed by: EMERGENCY MEDICINE

## 2024-04-20 PROCEDURE — 96365 THER/PROPH/DIAG IV INF INIT: CPT | Mod: 59

## 2024-04-20 PROCEDURE — 36415 COLL VENOUS BLD VENIPUNCTURE: CPT | Mod: XB | Performed by: NURSE PRACTITIONER

## 2024-04-20 PROCEDURE — 84443 ASSAY THYROID STIM HORMONE: CPT | Performed by: EMERGENCY MEDICINE

## 2024-04-20 PROCEDURE — 36600 WITHDRAWAL OF ARTERIAL BLOOD: CPT

## 2024-04-20 PROCEDURE — 82962 GLUCOSE BLOOD TEST: CPT

## 2024-04-20 PROCEDURE — 84484 ASSAY OF TROPONIN QUANT: CPT | Mod: 91 | Performed by: NURSE PRACTITIONER

## 2024-04-20 PROCEDURE — 99285 EMERGENCY DEPT VISIT HI MDM: CPT | Mod: 25

## 2024-04-20 PROCEDURE — 89051 BODY FLUID CELL COUNT: CPT | Performed by: INTERNAL MEDICINE

## 2024-04-20 RX ORDER — ASCORBIC ACID 500 MG
1000 TABLET ORAL DAILY
Status: DISCONTINUED | OUTPATIENT
Start: 2024-04-20 | End: 2024-04-20

## 2024-04-20 RX ORDER — FUROSEMIDE 40 MG/1
80 TABLET ORAL 2 TIMES DAILY
Status: DISCONTINUED | OUTPATIENT
Start: 2024-04-20 | End: 2024-04-20

## 2024-04-20 RX ORDER — LORAZEPAM 2 MG/ML
2 INJECTION INTRAMUSCULAR EVERY 4 HOURS PRN
Status: DISCONTINUED | OUTPATIENT
Start: 2024-04-20 | End: 2024-04-21

## 2024-04-20 RX ORDER — IPRATROPIUM BROMIDE AND ALBUTEROL SULFATE 2.5; .5 MG/3ML; MG/3ML
3 SOLUTION RESPIRATORY (INHALATION) EVERY 4 HOURS PRN
Status: DISCONTINUED | OUTPATIENT
Start: 2024-04-20 | End: 2024-04-24 | Stop reason: HOSPADM

## 2024-04-20 RX ORDER — DIPHENHYDRAMINE HYDROCHLORIDE 50 MG/ML
50 INJECTION INTRAMUSCULAR; INTRAVENOUS
Status: DISCONTINUED | OUTPATIENT
Start: 2024-04-20 | End: 2024-04-20

## 2024-04-20 RX ORDER — DIPHENHYDRAMINE HYDROCHLORIDE 50 MG/ML
25 INJECTION INTRAMUSCULAR; INTRAVENOUS
Status: COMPLETED | OUTPATIENT
Start: 2024-04-20 | End: 2024-04-20

## 2024-04-20 RX ORDER — SCOLOPAMINE TRANSDERMAL SYSTEM 1 MG/1
1 PATCH, EXTENDED RELEASE TRANSDERMAL
Status: DISCONTINUED | OUTPATIENT
Start: 2024-04-20 | End: 2024-04-24 | Stop reason: HOSPADM

## 2024-04-20 RX ORDER — PRAVASTATIN SODIUM 40 MG/1
40 TABLET ORAL NIGHTLY
Status: DISCONTINUED | OUTPATIENT
Start: 2024-04-20 | End: 2024-04-20

## 2024-04-20 RX ORDER — FUROSEMIDE 10 MG/ML
80 INJECTION INTRAMUSCULAR; INTRAVENOUS ONCE
Status: COMPLETED | OUTPATIENT
Start: 2024-04-20 | End: 2024-04-20

## 2024-04-20 RX ORDER — IBUPROFEN 100 MG/5ML
1000 SUSPENSION, ORAL (FINAL DOSE FORM) ORAL DAILY
Status: ON HOLD | COMMUNITY
End: 2024-04-23 | Stop reason: HOSPADM

## 2024-04-20 RX ORDER — CALCIUM CARBONATE 500(1250)
2 TABLET,CHEWABLE ORAL 2 TIMES DAILY
COMMUNITY

## 2024-04-20 RX ORDER — NAPROXEN SODIUM 220 MG/1
81 TABLET, FILM COATED ORAL DAILY
Status: DISCONTINUED | OUTPATIENT
Start: 2024-04-20 | End: 2024-04-20

## 2024-04-20 RX ORDER — METHYLPREDNISOLONE SOD SUCC 125 MG
125 VIAL (EA) INJECTION
Status: COMPLETED | OUTPATIENT
Start: 2024-04-20 | End: 2024-04-20

## 2024-04-20 RX ORDER — TRIAMCINOLONE ACETONIDE 1 MG/G
CREAM TOPICAL 2 TIMES DAILY PRN
COMMUNITY
Start: 2024-04-17

## 2024-04-20 RX ORDER — CLOPIDOGREL BISULFATE 75 MG/1
300 TABLET ORAL ONCE
Status: DISCONTINUED | OUTPATIENT
Start: 2024-04-20 | End: 2024-04-23

## 2024-04-20 RX ORDER — PROCHLORPERAZINE EDISYLATE 5 MG/ML
5 INJECTION INTRAMUSCULAR; INTRAVENOUS EVERY 6 HOURS PRN
Status: DISCONTINUED | OUTPATIENT
Start: 2024-04-20 | End: 2024-04-24 | Stop reason: HOSPADM

## 2024-04-20 RX ORDER — SODIUM BICARBONATE 650 MG/1
650 TABLET ORAL 3 TIMES DAILY
Status: DISCONTINUED | OUTPATIENT
Start: 2024-04-20 | End: 2024-04-21

## 2024-04-20 RX ORDER — NAPROXEN SODIUM 220 MG/1
81 TABLET, FILM COATED ORAL NIGHTLY
Status: DISCONTINUED | OUTPATIENT
Start: 2024-04-20 | End: 2024-04-20

## 2024-04-20 RX ORDER — CALCIUM CARBONATE 200(500)MG
1000 TABLET,CHEWABLE ORAL 2 TIMES DAILY
Status: DISCONTINUED | OUTPATIENT
Start: 2024-04-20 | End: 2024-04-20

## 2024-04-20 RX ORDER — GLUCAGON 1 MG
1 KIT INJECTION
Status: DISCONTINUED | OUTPATIENT
Start: 2024-04-21 | End: 2024-04-23

## 2024-04-20 RX ORDER — FUROSEMIDE 10 MG/ML
80 INJECTION INTRAMUSCULAR; INTRAVENOUS 2 TIMES DAILY
Status: DISCONTINUED | OUTPATIENT
Start: 2024-04-20 | End: 2024-04-23

## 2024-04-20 RX ORDER — LORAZEPAM 2 MG/ML
1 INJECTION INTRAMUSCULAR EVERY 6 HOURS PRN
Status: DISCONTINUED | OUTPATIENT
Start: 2024-04-20 | End: 2024-04-21

## 2024-04-20 RX ORDER — SODIUM CHLORIDE 0.9 % (FLUSH) 0.9 %
10 SYRINGE (ML) INJECTION
Status: DISCONTINUED | OUTPATIENT
Start: 2024-04-20 | End: 2024-04-24 | Stop reason: HOSPADM

## 2024-04-20 RX ORDER — OLMESARTAN MEDOXOMIL 5 MG/1
5 TABLET ORAL DAILY
COMMUNITY
Start: 2024-04-18

## 2024-04-20 RX ORDER — HEPARIN SODIUM 5000 [USP'U]/ML
5000 INJECTION, SOLUTION INTRAVENOUS; SUBCUTANEOUS EVERY 8 HOURS
Status: DISCONTINUED | OUTPATIENT
Start: 2024-04-20 | End: 2024-04-23

## 2024-04-20 RX ORDER — CLOPIDOGREL BISULFATE 75 MG/1
75 TABLET ORAL DAILY
Status: DISCONTINUED | OUTPATIENT
Start: 2024-04-21 | End: 2024-04-23

## 2024-04-20 RX ORDER — LABETALOL HYDROCHLORIDE 5 MG/ML
10 INJECTION, SOLUTION INTRAVENOUS EVERY 6 HOURS PRN
Status: DISCONTINUED | OUTPATIENT
Start: 2024-04-20 | End: 2024-04-24 | Stop reason: HOSPADM

## 2024-04-20 RX ORDER — MONTELUKAST SODIUM 10 MG/1
10 TABLET ORAL DAILY
Status: DISCONTINUED | OUTPATIENT
Start: 2024-04-20 | End: 2024-04-20

## 2024-04-20 RX ORDER — NAPROXEN SODIUM 220 MG/1
81 TABLET, FILM COATED ORAL NIGHTLY
Status: DISCONTINUED | OUTPATIENT
Start: 2024-04-20 | End: 2024-04-23

## 2024-04-20 RX ORDER — INSULIN ASPART 100 [IU]/ML
0-5 INJECTION, SOLUTION INTRAVENOUS; SUBCUTANEOUS EVERY 6 HOURS PRN
Status: DISCONTINUED | OUTPATIENT
Start: 2024-04-21 | End: 2024-04-23

## 2024-04-20 RX ORDER — ACETAMINOPHEN 650 MG/1
650 SUPPOSITORY RECTAL EVERY 6 HOURS PRN
Status: DISCONTINUED | OUTPATIENT
Start: 2024-04-20 | End: 2024-04-22

## 2024-04-20 RX ORDER — BISACODYL 10 MG/1
10 SUPPOSITORY RECTAL DAILY PRN
Status: DISCONTINUED | OUTPATIENT
Start: 2024-04-20 | End: 2024-04-24 | Stop reason: HOSPADM

## 2024-04-20 RX ORDER — LANOLIN ALCOHOL/MO/W.PET/CERES
1000 CREAM (GRAM) TOPICAL DAILY
Status: DISCONTINUED | OUTPATIENT
Start: 2024-04-20 | End: 2024-04-20

## 2024-04-20 RX ORDER — ONDANSETRON HYDROCHLORIDE 2 MG/ML
4 INJECTION, SOLUTION INTRAVENOUS EVERY 6 HOURS PRN
Status: DISCONTINUED | OUTPATIENT
Start: 2024-04-20 | End: 2024-04-24 | Stop reason: HOSPADM

## 2024-04-20 RX ORDER — SODIUM CHLORIDE 9 MG/ML
INJECTION, SOLUTION INTRAVENOUS
Status: DISCONTINUED | OUTPATIENT
Start: 2024-04-20 | End: 2024-04-24 | Stop reason: HOSPADM

## 2024-04-20 RX ORDER — CHOLECALCIFEROL (VITAMIN D3) 25 MCG
2000 TABLET ORAL DAILY
Status: DISCONTINUED | OUTPATIENT
Start: 2024-04-20 | End: 2024-04-20

## 2024-04-20 RX ORDER — HYDRALAZINE HYDROCHLORIDE 20 MG/ML
10 INJECTION INTRAMUSCULAR; INTRAVENOUS
Status: COMPLETED | OUTPATIENT
Start: 2024-04-20 | End: 2024-04-20

## 2024-04-20 RX ORDER — LANOLIN ALCOHOL/MO/W.PET/CERES
1000 CREAM (GRAM) TOPICAL DAILY
COMMUNITY

## 2024-04-20 RX ADMIN — PROCHLORPERAZINE EDISYLATE 5 MG: 5 INJECTION INTRAMUSCULAR; INTRAVENOUS at 03:04

## 2024-04-20 RX ADMIN — LORAZEPAM 1 MG: 2 INJECTION INTRAMUSCULAR; INTRAVENOUS at 05:04

## 2024-04-20 RX ADMIN — PIPERACILLIN AND TAZOBACTAM 4.5 G: 4; .5 INJECTION, POWDER, LYOPHILIZED, FOR SOLUTION INTRAVENOUS; PARENTERAL at 05:04

## 2024-04-20 RX ADMIN — SODIUM CHLORIDE: 9 INJECTION, SOLUTION INTRAVENOUS at 10:04

## 2024-04-20 RX ADMIN — DIPHENHYDRAMINE HYDROCHLORIDE 25 MG: 50 INJECTION INTRAMUSCULAR; INTRAVENOUS at 09:04

## 2024-04-20 RX ADMIN — LORAZEPAM 1 MG: 2 INJECTION INTRAMUSCULAR; INTRAVENOUS at 11:04

## 2024-04-20 RX ADMIN — SCOPOLAMINE 1 PATCH: 1.5 PATCH, EXTENDED RELEASE TRANSDERMAL at 03:04

## 2024-04-20 RX ADMIN — HEPARIN SODIUM 5000 UNITS: 5000 INJECTION INTRAVENOUS; SUBCUTANEOUS at 10:04

## 2024-04-20 RX ADMIN — FUROSEMIDE 80 MG: 10 INJECTION, SOLUTION INTRAVENOUS at 10:04

## 2024-04-20 RX ADMIN — HYDRALAZINE HYDROCHLORIDE 10 MG: 20 INJECTION, SOLUTION INTRAMUSCULAR; INTRAVENOUS at 10:04

## 2024-04-20 RX ADMIN — VANCOMYCIN HYDROCHLORIDE 1000 MG: 1 INJECTION, POWDER, LYOPHILIZED, FOR SOLUTION INTRAVENOUS at 10:04

## 2024-04-20 RX ADMIN — ONDANSETRON 4 MG: 2 INJECTION INTRAMUSCULAR; INTRAVENOUS at 02:04

## 2024-04-20 RX ADMIN — METHYLPREDNISOLONE SODIUM SUCCINATE 125 MG: 125 INJECTION, POWDER, FOR SOLUTION INTRAMUSCULAR; INTRAVENOUS at 09:04

## 2024-04-20 RX ADMIN — INSULIN ASPART 1 UNITS: 100 INJECTION, SOLUTION INTRAVENOUS; SUBCUTANEOUS at 11:04

## 2024-04-20 RX ADMIN — FUROSEMIDE 80 MG: 10 INJECTION, SOLUTION INTRAVENOUS at 05:04

## 2024-04-20 RX ADMIN — ACETAMINOPHEN 650 MG: 650 SUPPOSITORY RECTAL at 05:04

## 2024-04-20 RX ADMIN — HEPARIN SODIUM 5000 UNITS: 5000 INJECTION INTRAVENOUS; SUBCUTANEOUS at 03:04

## 2024-04-20 NOTE — PROGRESS NOTES
04/20/24 1255   Manual Peritoneal Dialysis   Manual Drain Volume (mL) 2100 mL   Effluent Appearance Clear   Manual Treatment Comments drain complete, sample sent to lab

## 2024-04-20 NOTE — ASSESSMENT & PLAN NOTE
-pleural fluid again removed while admitted back in March of 2024--studies were exudative with negative cytology.    -has heart failure as well as renal failure--on PD  -CXR on admission today (4/20)-Monitoring leads overlie the chest.  Left subclavian approach AICD. Grossly unchanged cardiomediastinal contours, again noting enlargement the cardiac silhouette and prominence of central pulmonary vasculature. Interstitial alveolar opacities have progressed since 03/12/2024 examination.  Persistent dense retrocardiac and left mid lower lung zone opacity.  More focal nodular opacity measuring on the order of 30 mm is noted in the left lateral hemithorax.  Small right and at least moderate left pleural effusions are noted. No definite pneumothorax. No acute findings in the visualized abdomen.  Vascular calcifications postoperative sequela project in the upper abdomen. Osseous and soft tissue structures appear without definite acute change.   -CT chest pending

## 2024-04-20 NOTE — ASSESSMENT & PLAN NOTE
Patient's anemia is currently controlled. Has not received any PRBCs to date. Etiology likely d/t chronic disease due to ESRD  Current CBC reviewed-   Lab Results   Component Value Date    HGB 11.1 (L) 04/20/2024    HCT 34.8 (L) 04/20/2024     Monitor serial CBC and transfuse if patient becomes hemodynamically unstable, symptomatic or H/H drops below 7/21.

## 2024-04-20 NOTE — ASSESSMENT & PLAN NOTE
-will continue Duonebs prn, Singulair-when she is able to take po, and Flonase. Per pulmonary outpatient she is no longer on maintenance inhalers.

## 2024-04-20 NOTE — ASSESSMENT & PLAN NOTE
-code stroke activated in the ED  -appreciate vascular neuro eval  -[facility] neuro consulted and plan is below:    - Pt has MRI non compatible device so would recommend repeating CTH in 6-12 hrs if concern for stroke remains   - CTA held by nephrology. Might need to discuss need and options since we can not obtain any other vessel imaging other than US carotid (which does not assesses intractranial vessels and post circulation)  - Workup for toxic/metabolic/infectious abnormalities per primary  - If encephalopathy does not resolve consider EEG--EEG pending

## 2024-04-20 NOTE — SUBJECTIVE & OBJECTIVE
HPI:  83 y.o. female with medical history of CKD on PD, HTN, DM, HLD, Pacemaker - with admission concerns of focal neurological deficits. And Stroke code called in for the same.      History from family and medical records review. Symptoms of confusion / reduced interactions - aphasia / poor attention-concentration with generalized weakness on wake up. No compressive neuropathy pattern of presentation. No associated complex headaches or clinical seizures. No similar events in the past. No history of strokes, seizures or migraines.     Exam: drowsy / opens eye to verbal stimuli / intermittent follow in simple commands - however most not / majorly mute - incomprehensible speech / no gaze, no droop / No UE drift / RLE drift > LLE      Images personally reviewed and interpreted:  Study: Head CT  Study Interpretation: No acute findings      Assessment and plan:  Recs:  Suspect mimic / encephalopathy - metabolic >>> focal cerebral ischemic event rather mimic - however needs rule out  .   MRI limited for pacemaker / limitations for consideration for wake up protocol TNK assessment     - STAT CTA head and neck for any LVO or MeVO assessment / if any positive concerns - needs transfer to the nearest thrombectomy capable center     - Plavix 300 mg load - followed by DAPT X 21 day, ASA 81/Plavix 75, with asa thereafter  - target LDL < 70 / Continue atorvastatin  - euglycemic goals   - permissive HTN x 72 hrs post index event / long term < 140/90  - Echo f/u    - PT/OT recs - discharge planning   - F/u PCP for risk factor modification monitoring /  on DASH diet; exercise and lifestyle modifications when appropriate     Encephalopathy evaluation and management  -- Correct lytes as needed / investigate and control infection if any / avoid hypoxia or hypercapnia / avoid hypoglycemia / control uremia - avoid rapid correction / avoid-correct metabolic-respiratory acidosis or alkalosis   -- Correct any nutritional deficiencies  / correct anemia / provide nutritional support as appropriate / ambulate when appropriate   -- PT/OT evaluation and management   -- delirium precautions      Lytics recommendation: Thrombolytic therapy not recommended due to Suspected stroke mimic   Thrombectomy recommendation: Awaiting CTA results from [facility] for determination   Placement recommendation: pending further studies

## 2024-04-20 NOTE — CONSULTS
Nephrology Consult  H&P      Consult Requested By: Geri Beltrán MD  Reason for Consult: ESRD    SUBJECTIVE:     History of Present Illness:  Patient is a 83 y.o. female presents with altered mental status.  Patient is well known to me [facility].  Patient has excellent residual renal function and doing minimal dialysis until last month when she developed obstructive VIRGILIO due to displacement of her pessary.  This has been resolved in her renal function was improving up until last month she was doing 1 exchange twice per week but due to recent VIRGILIO peritoneal dialysis had to be intensified and for the last month she was doing 1 exchange every night.  Her labs were improving and we will waiting for the labs that was done this Wednesday to see if we can deescalate dialysis again she is hypokalemic and required potassium supplementation.  Patient had very similar presentation last month with some type of altered mental status.  I do not understand concern for stroke the but really would try to avoid any type of contrast in order to try to preserve her residual renal function.        Review of Systems   Unable to perform ROS: Mental status change     Past Medical History:   Diagnosis Date    Acute encephalopathy 02/29/2024    Acute hypoxemic respiratory failure 12/19/2019    Acute right-sided thoracic back pain 12/09/2019    Allergy     Asthma     Basal cell carcinoma     left forehead    Basal cell carcinoma     left nose    Basal cell carcinoma 05/20/2015    right nose    Basal cell carcinoma 12/22/2015    left lower post neck    Basal cell carcinoma 12/03/2019    left ant scalp     BCC (basal cell carcinoma of skin) 10/20/2022    Right alar groove    Bilateral pleural effusion     Bilateral renal cysts     Breast cancer     CAD (coronary artery disease)     Cardiomyopathy     Cardiomyopathy, ischemic     Cataract     Chest pain 03/04/2024    CHF (congestive heart  failure)     CKD (chronic kidney disease) stage 4, GFR 15-29 ml/min     Colon polyp 2011    Controlled type 2 diabetes mellitus with both eyes 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 nonproliferative retinopathy and macular edema, with long-term current use of insulin 02/22/2018    COPD (chronic obstructive pulmonary disease)     COPD exacerbation 04/08/2018    Current mild episode of major depressive disorder without prior episode 01/25/2022    Defibrillator discharge     Dependence on renal dialysis 08/22/2023    Diabetes mellitus     Diabetes mellitus type II     Diabetes with neurologic complications     Edema     ESRD needing dialysis     Goals of care, counseling/discussion 07/08/2022    Goiter     MNG    Hematuria, unspecified     HX: breast cancer     Hyperlipidemia     Hypertension     Hypocalcemia     Hypokalemia     Hyponatremia     Hyponatremia 04/02/2022    Iron deficiency anemia 05/16/2017    Iron deficiency anemia     Iron deficiency anemia     Left kidney mass     Meningioma     Microalbuminuria due to type 2 diabetes mellitus 01/26/2022    Osteoporosis, postmenopausal     Pneumonia 12/08/2019    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    Proteinuria 01/21/2019    Pseudomonas pneumonia     SCC (squamous cell carcinoma) 08/25/2022    right posterior neck    Skin cancer     s/p excision    Sleep apnea     CPAP    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/2015    mid forehead    Unspecified vitamin D deficiency     UTI (urinary tract infection) 04/02/2022    Ventricular tachycardia     Vitamin B12 deficiency     Vitamin D deficiency disease      Past Surgical History:   Procedure Laterality Date    BASAL CELL CARCINOMA EXCISION      posterior neck and nose    BREAST BIOPSY      BREAST CYST EXCISION Left     BREAST SURGERY      CARDIAC DEFIBRILLATOR PLACEMENT      x 2    CATARACT EXTRACTION W/  INTRAOCULAR LENS IMPLANT Bilateral     CHOLECYSTECTOMY      COLONOSCOPY N/A 11/5/2019    Procedure: COLONOSCOPY;  Surgeon: Boaz Botello,  MD;  Location: [facility] ENDO (4TH FLR);  Service: Endoscopy;  Laterality: N/A;  AICD - Medtronic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    FRACTURE SURGERY      left elbow and wrist as a child    HYSTERECTOMY      INSERTION, CATHETER, DIALYSIS, PERITONEAL, LAPAROSCOPIC N/A 4/25/2023    Procedure: INSERTION, CATHETER, DIALYSIS, PERITONEAL, LAPAROSCOPIC;  Surgeon: Marcelo Isaac MD;  Location: [facility] OR;  Service: General;  Laterality: N/A;    LAPAROSCOPIC LYSIS OF ADHESIONS N/A 4/25/2023    Procedure: LYSIS, ADHESIONS, LAPAROSCOPIC;  Surgeon: Marcelo Isaac MD;  Location: [facility] OR;  Service: General;  Laterality: N/A;    MASTECTOMY Right     OMENTOPEXY, LAPAROSCOPIC N/A 4/25/2023    Procedure: OMENTOPEXY, LAPAROSCOPIC;  Surgeon: Marcelo Isaac MD;  Location: [facility] OR;  Service: General;  Laterality: N/A;    REPLACEMENT OF IMPLANTABLE CARDIOVERTER-DEFIBRILLATOR (ICD) GENERATOR N/A 12/17/2018    Procedure: REPLACEMENT, ICD GENERATOR;  Surgeon: Jan Mckeon MD;  Location: [facility] EP LAB;  Service: Cardiology;  Laterality: N/A;  DONNA, CRT-D gen change, MDT, MAC, SK, 3 Prep    REVISION OF SKIN POCKET FOR CARDIOVERTER-DEFIBRILLATOR  12/17/2018    Procedure: Revision, Skin Pocket, For Cardioverter-Defibrillator;  Surgeon: Jan Mckeon MD;  Location: [facility] EP LAB;  Service: Cardiology;;    SQUAMOUS CELL CARCINOMA EXCISION      remved from forehead    TONSILLECTOMY       Family History   Problem Relation Name Age of Onset    Asthma Mother      Hypertension Mother      Stroke Mother      Diabetes Father      Cardiomyopathy Father      Diabetes Sister x1     Heart disease Sister x1     Heart attack Sister x1     Heart attack Brother Jarek     Diabetes Brother Jarek     Heart disease Brother Jarek     Hypertension Brother Jarek     Diabetes Brother Yee     Heart disease Brother Yee     Hypertension Brother Yee     Cerebral aneurysm Brother Yee     Diabetes Brother      Heart disease Brother       Cancer Brother          colon    Diabetes Brother      Diabetes Daughter x1         prediabetes    Cancer Daughter x1         melanoma    Obesity Daughter x1     Melanoma Daughter x1     Cancer Son          skin    Diabetes Son          prediabetes    Diabetes Son      No Known Problems Maternal Grandmother      No Known Problems Maternal Grandfather      No Known Problems Paternal Grandmother      No Known Problems Paternal Grandfather      Amblyopia Neg Hx      Blindness Neg Hx      Cataracts Neg Hx      Glaucoma Neg Hx      Macular degeneration Neg Hx      Retinal detachment Neg Hx      Strabismus Neg Hx      Thyroid disease Neg Hx       Social History     Tobacco Use    Smoking status: Never     Passive exposure: Never    Smokeless tobacco: Never   Substance Use Topics    Alcohol use: No     Alcohol/week: 0.0 standard drinks of alcohol    Drug use: No     Review of patient's allergies indicates:   Allergen Reactions    Iodine and iodide containing products Hives and Other (See Comments)     Not specified     Nifedipine      weakness      OBJECTIVE:     Vital Signs (Most Recent)  Vitals:    04/20/24 0855 04/20/24 0902 04/20/24 0932 04/20/24 1049   BP: (!) 197/88 (!) 201/83 (!) 193/82 (!) 211/92   BP Location:       Patient Position:       Pulse: 65 61 65 65   Resp: (!) 25 (!) 32 (!) 23 (!) 33   Temp:       TempSrc:       SpO2: (!) 90% 97% 97% (!) 94%   Weight:           Medications:  Current Facility-Administered Medications   Medication Dose Route Frequency    heparin (porcine)  5,000 Units Subcutaneous Q8H     Physical Exam  Constitutional:       General: She is not in acute distress.     Appearance: She is not diaphoretic.   HENT:      Head: Normocephalic and atraumatic.   Eyes:      General: No scleral icterus.  Neck:      Vascular: No JVD.   Cardiovascular:      Rate and Rhythm: Normal rate and regular rhythm.      Heart sounds: No murmur heard.     No friction rub.   Pulmonary:      Effort: Pulmonary effort  is normal. No respiratory distress.      Breath sounds: Normal breath sounds. No wheezing or rales.   Abdominal:      General: Bowel sounds are normal. There is no distension.      Palpations: Abdomen is soft.      Tenderness: There is no abdominal tenderness.   Musculoskeletal:      Cervical back: Neck supple.   Skin:     General: Skin is warm and dry.      Findings: Rash (This is developed over last 3 weeks.  Patient had seen Dr. The Yessenia, Dermatology earlier this week and was prescribed some kind of cream.  He would told patient's family that this is typical rash of dialysis.I Do not necessarily agree with that) present. No erythema.   Neurological:      Mental Status: She is alert and oriented to person, place, and time.   Psychiatric:         Mood and Affect: Affect normal.       Diagnostic Results:  X-Ray: Reviewed  US: Reviewed  Echo: Reviewed  ASSESSMENT/PLAN:   1. ESRD (N18.6 Z99.2) - Patient is well known to me [facility]  Patient has excellent residual renal function and was doing minimal dialysis until last month when she developed obstructive VIRGILIO due to displacement of her pessary.  This has been resolved in her renal function was improving. Up until last month she was doing 1 exchange twice per week but due to recent VIRGILIO peritoneal dialysis had to be intensified and for the last month she was doing 1 exchange every night.  Her labs were improving and we will waiting for the labs that was done this Wednesday to see if we can deescalate dialysis again.  she is hypokalemic and required potassium supplementation.    Patient had very similar presentation last month with some type of altered mental status.  I do not understand concern for stroke the but really would try to avoid any type of contrast in order to try to preserve her residual renal function.        Currently she is doing 2 L of Extraneal before going to bed and drain in the morning.  Manual  exchanges.  She right now has 2 L of fluid in her belly that we will drain as send fluid for sample for cell count, cultures we will, and g stain.  We will I have so no suspicious of peritonitis at this point and doing this for completeness of workup for altered mental status.          Recent Labs   Lab 04/20/24  0839      K 4.3   BUN 67*   CREATININE 3.5*     2. HTN (I10) - Patient previously was on valsartan and saw it was discontinued last month when she had sepsis due to her obstructive uropathy and UTI.  She called my office on Wednesday saying that for the last 3 days blood pressure persistently 170s 180 systolic and yesterday she did start 5 mg of Benicar. - this is new medication for she never been on it before previously on Valsartan      Since the stroke is of concern allow permissive hypotension with blood pressure up to 180s      3. Anemia of chronic kidney disease treated with MIGUEL (N18.9 D63.1) - patient has not received any Epogen potatoes in the last 3 months which is again due to her excellent residual renal function  Recent Labs   Lab 04/20/24  0839   HGB 11.1*   HCT 34.8*        Lab Results   Component Value Date    IRON 124 05/09/2023    TIBC 355 05/09/2023    FERRITIN 639 (H) 05/19/2023     4. MBD (E88.9 M90.80) - excellent control on sevelamer 800 with each meal, continue ergocalciferol  Recent Labs   Lab 04/20/24  0839   CALCIUM 8.2*   PHOS 4.1     Recent Labs   Lab 04/20/24  0839   MG 1.8     Lab Results   Component Value Date    PYATRCIQ56XR 23 (L) 03/01/2024     5. Acidosis - mild, this is new, her bicarbonate on Wednesday was 26,   Resume her sodium bicarbonate 650 mg t.i.d.  Recent Labs   Lab 04/20/24  0839   CL 99   CO2 21*     6. Peritoneal dialysis Access (Z99.2 V45.11)- continue routine PD cath exit site care   7. Nutrition/Hypoalbuminemia (E88.09) -   Recent Labs   Lab 04/20/24  0839   LABPROT 11.6   ALBUMIN 2.4*     Nepro with meals TID. Renal vitamins daily        Thank  you for allowing me to participate in care of your patient  With any question please call   Mansi Borrero    [facility]  YONG Austin MD, ESTRELLA RAMÍREZ MD,   MD TAMIKA Isabel MD E. V. Harmon, NP  [address]  [place], [address]  [phone]  After hours answering service: [phone]

## 2024-04-20 NOTE — TELEMEDICINE CONSULT
[facility]  Vascular Neurology  Comprehensive Stroke Center  TeleVascular Neurology Acute Consultation Note        Consult Information  Consult to Telemedicine - Acute Stroke  Consult performed by: Lydia Cooper MD  Consult ordered by: Salinas Tyson MD          Consulting Provider: SALINAS TYSON   Current Providers  No providers found    Patient Location:  [facility] EMERGENCY DEPARTMENT Emergency Department    Spoke hospital nurse at bedside with patient assisting consultant.  Patient information was obtained from relative(s) and primary team.       Stroke Documentation  Acute Stroke Times   Last Known Normal Date: 04/19/24  Last Known Normal Time: 2145  Unknown Symptom Onset Date: Unknown Date  Symptom Onset Time:  (Wake up stroke symptoms)  Unknown Symptom Onset Time: Unknown Time  Stroke Team Called Date: 04/20/24  Stroke Team Called Time: 0830  Stroke Team Arrival Date: 04/20/24  Stroke Team Arrival Time: 0838  CT Interpretation Time: 0840  Thrombolytic Therapy Recommended: No  Thrombectomy Recommended: No    NIH Scale:  1a. Level of Consciousness: 1-->Not alert, but arousable by minor stimulation to obey, answer, or respond  1b. LOC Questions: 0-->Answers both questions correctly  1c. LOC Commands: 0-->Performs both tasks correctly  2. Best Gaze: 0-->Normal  3. Visual: 0-->No visual loss  4. Facial Palsy: 0-->Normal symmetrical movements  5a. Motor Arm, Left: 0-->No drift, limb holds 90 (or 45) degrees for full 10 secs  5b. Motor Arm, Right: 0-->No drift, limb holds 90 (or 45) degrees for full 10 secs  6a. Motor Leg, Left: 2-->Some effort against gravity, leg falls to bed by 5 secs, but has some effort against gravity  6b. Motor Leg, Right: 0-->No drift, leg holds 30 degree position for full 5 secs  7. Limb Ataxia: 0-->Absent  8. Sensory: 0-->Normal, no sensory loss  9. Best Language: 2-->Severe aphasia, all communication is through fragmentary expression, great  need for inference, questioning, and guessing by the listener. Range of information that can be exchanged is limited, listener carries burden of. . . (see row details)  10. Dysarthria: 2-->Severe dysarthria, patients speech is so slurred as to be unintelligible in the absence of or out of proportion to any dysphasia, or is mute/anarthric  11. Extinction and Inattention (formerly Neglect): 1-->Visual, tactile, auditory, spatial, or personal inattention or extinction to bilateral simultaneous stimulation in one of the sensory modalities  Total (NIH Stroke Scale): 8      Modified [place]:    [place] Coma Scale: 12   ABCD2 Score:    [id number] Score:    HAS -BLED Score:    ICH Score:    Hunt & Ayala Classification:      Blood pressure (!) 197/85, pulse 64, temperature 97.9 °F (36.6 °C), temperature source Oral, resp. rate (!) 28, SpO2 95%.    N/A    Medical Decision Making  HPI:  83 y.o. female with medical history of CKD on PD, HTN, DM, HLD, Pacemaker - with admission concerns of focal neurological deficits. And Stroke code called in for the same.      History from family and medical records review. Symptoms of confusion / reduced interactions - aphasia / poor attention-concentration with generalized weakness on wake up. No compressive neuropathy pattern of presentation. No associated complex headaches or clinical seizures. No similar events in the past. No history of strokes, seizures or migraines.     Exam: drowsy / opens eye to verbal stimuli / intermittent follow in simple commands - however most not / majorly mute - incomprehensible speech / no gaze, no droop / No UE drift / RLE drift > LLE      Images personally reviewed and interpreted:  Study: Head CT  Study Interpretation: No acute findings      Assessment and plan:  Recs:  Suspect mimic / encephalopathy - metabolic >>> focal cerebral ischemic event rather mimic - however needs rule out  .   MRI limited for pacemaker / limitations for consideration for wake up  protocol TNK assessment     - STAT CTA head and neck for any LVO or MeVO assessment / if any positive concerns - needs transfer to the nearest thrombectomy capable center     - Plavix 300 mg load - followed by DAPT X 21 day, ASA 81/Plavix 75, with asa thereafter  - target LDL < 70 / Continue atorvastatin  - euglycemic goals   - permissive HTN x 72 hrs post index event / long term < 140/90  - Echo f/u    - PT/OT recs - discharge planning   - F/u PCP for risk factor modification monitoring /  on DASH diet; exercise and lifestyle modifications when appropriate     Encephalopathy evaluation and management  -- Correct lytes as needed / investigate and control infection if any / avoid hypoxia or hypercapnia / avoid hypoglycemia / control uremia - avoid rapid correction / avoid-correct metabolic-respiratory acidosis or alkalosis   -- Correct any nutritional deficiencies / correct anemia / provide nutritional support as appropriate / ambulate when appropriate   -- PT/OT evaluation and management   -- delirium precautions      Lytics recommendation: Thrombolytic therapy not recommended due to Suspected stroke mimic   Thrombectomy recommendation: Awaiting CTA results from Spoke for determination   Placement recommendation: pending further studies         Past Medical History:   Diagnosis Date    Acute encephalopathy 02/29/2024    Acute hypoxemic respiratory failure 12/19/2019    Acute right-sided thoracic back pain 12/09/2019    Allergy     Asthma     Basal cell carcinoma     left forehead    Basal cell carcinoma     left nose    Basal cell carcinoma 05/20/2015    right nose    Basal cell carcinoma 12/22/2015    left lower post neck    Basal cell carcinoma 12/03/2019    left ant scalp     BCC (basal cell carcinoma of skin) 10/20/2022    Right alar groove    Bilateral pleural effusion     Bilateral renal cysts     Breast cancer     CAD (coronary artery disease)     Cardiomyopathy     Cardiomyopathy, ischemic      Cataract     Chest pain 03/04/2024    CHF (congestive heart failure)     CKD (chronic kidney disease) stage 4, GFR 15-29 ml/min     Colon polyp 2011    Controlled type 2 diabetes mellitus with both eyes affected by mild nonproliferative retinopathy and macular edema, with long-term current use of insulin 02/22/2018    COPD (chronic obstructive pulmonary disease)     COPD exacerbation 04/08/2018    Current mild episode of major depressive disorder without prior episode 01/25/2022    Defibrillator discharge     Dependence on renal dialysis 08/22/2023    Diabetes mellitus     Diabetes mellitus type II     Diabetes with neurologic complications     Edema     ESRD needing dialysis     Goals of care, counseling/discussion 07/08/2022    Goiter     MNG    Hematuria, unspecified     HX: breast cancer     Hyperlipidemia     Hypertension     Hypocalcemia     Hypokalemia     Hyponatremia     Hyponatremia 04/02/2022    Iron deficiency anemia 05/16/2017    Iron deficiency anemia     Iron deficiency anemia     Left kidney mass     Meningioma     Microalbuminuria due to type 2 diabetes mellitus 01/26/2022    Osteoporosis, postmenopausal     Pneumonia 12/08/2019    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    Proteinuria 01/21/2019    Pseudomonas pneumonia     SCC (squamous cell carcinoma) 08/25/2022    right posterior neck    Skin cancer     s/p excision    Sleep apnea     CPAP    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/2015    mid forehead    Unspecified vitamin D deficiency     UTI (urinary tract infection) 04/02/2022    Ventricular tachycardia     Vitamin B12 deficiency     Vitamin D deficiency disease      Past Surgical History:   Procedure Laterality Date    BASAL CELL CARCINOMA EXCISION      posterior neck and nose    BREAST BIOPSY      BREAST CYST EXCISION Left     BREAST SURGERY      CARDIAC DEFIBRILLATOR PLACEMENT      x 2    CATARACT EXTRACTION W/  INTRAOCULAR LENS IMPLANT Bilateral     CHOLECYSTECTOMY      COLONOSCOPY N/A  11/5/2019    Procedure: COLONOSCOPY;  Surgeon: Boaz Botello MD;  Location: [facility] ENDO ([address] [place] [address]);  Service: Endoscopy;  Laterality: N/A;  AICD - [employer] -     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    FRACTURE SURGERY      left elbow and wrist as a child    HYSTERECTOMY      INSERTION, CATHETER, DIALYSIS, PERITONEAL, LAPAROSCOPIC N/A 4/25/2023    Procedure: INSERTION, CATHETER, DIALYSIS, PERITONEAL, LAPAROSCOPIC;  Surgeon: Marcelo Isaac MD;  Location: [facility] OR;  Service: General;  Laterality: N/A;    LAPAROSCOPIC LYSIS OF ADHESIONS N/A 4/25/2023    Procedure: LYSIS, ADHESIONS, LAPAROSCOPIC;  Surgeon: Marcelo Isaac MD;  Location: [facility] OR;  Service: General;  Laterality: N/A;    MASTECTOMY Right     OMENTOPEXY, LAPAROSCOPIC N/A 4/25/2023    Procedure: OMENTOPEXY, LAPAROSCOPIC;  Surgeon: Marcelo Isaac MD;  Location: [facility] OR;  Service: General;  Laterality: N/A;    REPLACEMENT OF IMPLANTABLE CARDIOVERTER-DEFIBRILLATOR (ICD) GENERATOR N/A 12/17/2018    Procedure: REPLACEMENT, ICD GENERATOR;  Surgeon: Jan Mckeon MD;  Location: [facility] EP LAB;  Service: Cardiology;  Laterality: N/A;  DONNA, CRT-D gen change, MDT, MAC, SK, 3 Prep    REVISION OF SKIN POCKET FOR CARDIOVERTER-DEFIBRILLATOR  12/17/2018    Procedure: Revision, Skin Pocket, For Cardioverter-Defibrillator;  Surgeon: Jan Mckeon MD;  Location: [facility] EP LAB;  Service: Cardiology;;    SQUAMOUS CELL CARCINOMA EXCISION      remved from forehead    TONSILLECTOMY       Family History   Problem Relation Name Age of Onset    Asthma Mother      Hypertension Mother      Stroke Mother      Diabetes Father      Cardiomyopathy Father      Diabetes Sister x1     Heart disease Sister x1     Heart attack Sister x1     Heart attack Brother Jarek     Diabetes Brother Jarek     Heart disease Brother Jarek     Hypertension Brother Jarek     Diabetes Brother Yee     Heart disease Brother Yee     Hypertension Brother Yee     Cerebral aneurysm Brother  Yee     Diabetes Brother      Heart disease Brother      Cancer Brother          colon    Diabetes Brother      Diabetes Daughter x1         prediabetes    Cancer Daughter x1         melanoma    Obesity Daughter x1     Melanoma Daughter x1     Cancer Son          skin    Diabetes Son          prediabetes    Diabetes Son      No Known Problems Maternal Grandmother      No Known Problems Maternal Grandfather      No Known Problems Paternal Grandmother      No Known Problems Paternal Grandfather      Amblyopia Neg Hx      Blindness Neg Hx      Cataracts Neg Hx      Glaucoma Neg Hx      Macular degeneration Neg Hx      Retinal detachment Neg Hx      Strabismus Neg Hx      Thyroid disease Neg Hx         Diagnoses  Problem Noted   Focal Neurological Deficit 4/20/2024       Lydia Cooper MD      Emergent/Acute neurological consultation requested by spoke provider due to critical concerns for possible cerebrovascular event that could result in permanent loss of neurologic/bodily function, severe disability or death of this patient.  Immediate/timely evaluation by a highly prepared expert is paramount for optimal outcomes  High risk for neurological deterioration if not properly diagnosed  High risk for neurological deterioration if not treated promplty/as soon as possible  Complex diagnostic evaluation may be required (advanced imaging)  High risk treatment options (thrombolytics and/or thrombectomy)    Patient care was coordinated with spoke provider, including but not limted to    Discussing likely diagnosis/etiology of symptoms  Making recommendations for further diagnostic studies  Making recommendations for intravenous thrombolytics or other advanced therapies  Making recommendations for disposition (admission/transfer for higher level of care)

## 2024-04-20 NOTE — PHARMACY MED REC
"    [facility]           Pharmacy  Admission Medication History     The home medication history was taken by Julieta Rodriguez.      Medication history obtained from Medications listed below were obtained from: Patient/family    Based on information gathered for medication list, you may go to "Admission" then "Reconcile Home Medications" tabs to review and/or act upon those items.     The home medication list has been updated by the Pharmacy department.   Please read ALL comments highlighted in yellow.   Please address this information as you see fit.    Feel free to contact us if you have any questions or require assistance.    The medications listed below were removed from the home medication list.  Please reorder if appropriate:    Patient reports NOT TAKING the following medication(s):  Flonase nasal  Diovan 320mg      No current facility-administered medications on file prior to encounter.     Current Outpatient Medications on File Prior to Encounter   Medication Sig Dispense Refill    albuterol-ipratropium (DUO-NEB) 2.5 mg-0.5 mg/3 mL nebulizer solution Take 3 mLs by nebulization every 4 (four) hours as needed for Wheezing. 270 mL 12    ascorbic acid, vitamin C, (VITAMIN C) 1000 MG tablet Take 1,000 mg by mouth once daily.      aspirin 81 MG Chew Take 81 mg by mouth nightly.      calcium carbonate (OS-MURRAY) 500 mg calcium (1,250 mg) chewable tablet Take 2 tablets by mouth 2 (two) times daily.      cholecalciferol, vitamin D3, (VITAMIN D3) 50 mcg (2,000 unit) Tab Take 1 tablet by mouth once daily.       cyanocobalamin (VITAMIN B-12) 1000 MCG tablet Take 1,000 mcg by mouth once daily.      ergocalciferol (ERGOCALCIFEROL) 50,000 unit Cap Take 50,000 Units by mouth every 7 days.      estradioL (ESTRACE) 0.01 % (0.1 mg/gram) vaginal cream PLACE 1 GRAM VAGINALLY EVERY OTHER DAY (Patient taking differently: Place 1 g vaginally every other day.) 42.5 g 3    estradioL (ESTRING) 2 mg (7.5 mcg /24 hour) " "vaginal ring Place 2 mg vaginally every 3 (three) months. 1 each 3    furosemide (LASIX) 80 MG tablet Take 1 tablet (80 mg total) by mouth 2 (two) times a day. 60 tablet 3    insulin (LANTUS SOLOSTAR U-100 INSULIN) glargine 100 units/mL SubQ pen Inject 8 Units into the skin every evening.  0    montelukast (SINGULAIR) 10 mg tablet TAKE 1 TABLET(10 MG) BY MOUTH EVERY DAY (Patient taking differently: Take 10 mg by mouth once daily.) 90 tablet 1    olmesartan (BENICAR) 5 MG Tab Take 5 mg by mouth once daily.      omega-3 fatty acids 500 mg Cap Take 1 capsule by mouth Daily.      potassium chloride SA (K-DUR,KLOR-CON) 20 MEQ tablet Take 20 mEq by mouth once daily.      pravastatin (PRAVACHOL) 40 MG tablet TAKE 1 TABLET(40 MG) BY MOUTH EVERY DAY (Patient taking differently: Take 40 mg by mouth every evening.) 90 tablet 1    sevelamer carbonate (RENVELA) 800 mg Tab Take 2 tablets (1,600 mg total) by mouth 3 (three) times daily. 180 tablet 3    sodium bicarbonate 650 MG tablet Take 1 tablet (650 mg total) by mouth 4 (four) times daily. (Patient taking differently: Take 650 mg by mouth 3 (three) times daily.) 120 tablet 3    triamcinolone acetonide 0.1% (KENALOG) 0.1 % cream Apply topically 2 (two) times daily as needed.      blood sugar diagnostic (ACCU-CHEK HECTOR) Strp Uses Accu-Check Hecotr meter to test BG 4x/day 400 strip 6    epoetin jamaica-epbx (RETACRIT) 10,000 unit/mL imjection Inject 1 mL (10,000 Units total) into the skin every 30 days. 1 mL 11    lancets (ACCU-CHEK SOFTCLIX LANCETS) Misc Uses Accu-Chek Hector meter to test BG 2x/day 400 each 6    nitroGLYCERIN (NITROSTAT) 0.4 MG SL tablet Place 0.4 mg under the tongue every 5 (five) minutes as needed for Chest pain.       pen needle, diabetic (BD ULTRA-FINE MINI PEN NEEDLE) 31 gauge x 3/16" Ndle Use with insulin twice a day. 400 each 3       Please address this information as you see fit.  Feel free to contact us if you have any questions or require " assistance.    Julieta Rdoriguez  [phone]              .

## 2024-04-20 NOTE — PROGRESS NOTES
Pharmacokinetic Initial Assessment: IV Vancomycin    Assessment/Plan:    Initiate intravenous vancomycin with loading dose of 1000 mg (20mg per kg) once with subsequent doses when random concentrations are less than 20 mcg/mL  Desired empiric serum trough concentration is 15 to 20 mcg/mL  Draw vancomycin random level on 04/21/24 at 2000.  Pharmacy will continue to follow and monitor vancomycin.      Please contact pharmacy at extension [phone] with any questions regarding this assessment.     Thank you for the consult,   Nadeen Oneal       Patient brief summary:  Myesha Quinones is a 83 y.o. female initiated on antimicrobial therapy with IV Vancomycin for treatment of suspected sepsis    Drug Allergies:   Review of patient's allergies indicates:   Allergen Reactions    Iodine and iodide containing products Hives and Other (See Comments)     Not specified     Nifedipine      weakness       Actual Body Weight:   55.3 kg    Renal Function:   Estimated Creatinine Clearance: 9.9 mL/min (A) (based on SCr of 3.5 mg/dL (H)).,     Dialysis Method (if applicable):  One time ordered for this am 04/20/24    CBC (last 72 hours):  Recent Labs   Lab Result Units 04/20/24  0839   WBC K/uL 10.23   Hemoglobin g/dL 11.1*   Hematocrit % 34.8*   Platelets K/uL 248   Gran % % 79.3*   Lymph % % 10.6*   Mono % % 5.5   Eosinophil % % 3.2   Basophil % % 0.7   Differential Method  Automated       Metabolic Panel (last 72 hours):  Recent Labs   Lab Result Units 04/20/24  0839 04/20/24  0949   Sodium mmol/L 137  --    Potassium mmol/L 4.3  --    Chloride mmol/L 99  --    CO2 mmol/L 21*  --    Glucose mg/dL 194*  --    Glucose, UA   --  Trace*   BUN mg/dL 67*  --    Creatinine mg/dL 3.5*  --    Albumin g/dL 2.4*  --    Total Bilirubin mg/dL 0.2  --    Alkaline Phosphatase U/L 166*  --    AST U/L 17  --    ALT U/L 14  --    Magnesium mg/dL 1.8  --    Phosphorus mg/dL 4.1  --        Drug levels (last 3 results):  No results for  "input(s): "VANCOMYCINRA", "VANCORANDOM", "VANCOMYCINPE", "VANCOPEAK", "VANCOMYCINTR", "VANCOTROUGH" in the last 72 hours.    Microbiologic Results:  Microbiology Results (last 7 days)       Procedure Component Value Units Date/Time    Blood culture [[id number]] Collected: 04/20/24 1554    Order Status: Sent Specimen: Blood from Peripheral, Antecubital, Left     Blood culture [[id number]] Collected: 04/20/24 1554    Order Status: Sent Specimen: Blood     Gram stain [[id number]] Collected: 04/20/24 1239    Order Status: Sent Specimen: Peritoneal Fluid Updated: 04/20/24 1548    Aerobic culture [[id number]] Collected: 04/20/24 1239    Order Status: Sent Specimen: Peritoneal Fluid Updated: 04/20/24 1548            "

## 2024-04-20 NOTE — CONSULTS
[facility]UR[facility] CONSULT  EVALUATION    Reason for consult:  AMS  Informant:  Family  Other sources of information : chart    CC:  Altered Mental Status (Patient coming from home via [facility]. Altered mental status this morning when she woke up. Last know well last night when she went to bed but time unknown. History of Peritoneal dialysis daily and unsure if patient received dialysis last night. Glucose 137. Patient awake but not answering questions at this time. )       HPI: Myesha Quinones is a 83 y.o. female with PMH of DM, HTN, CKD on PD, NICM, CAD, LBBB w/ CRT-D who presents for encephalopathy x 2days    Per family pt was discharged 5-6 wks ago from hospital and at that time her PD frequency has been changed from 2 times a wk to daily. She was taken off of most of her medications for BP and has been having higher than normal BP.     She was found to be in her room today, minimally interactive, wo focal weakness. BG was wnl, SBP ~180, Oxygenation wnl at home. She can not follow complex commands only nodding when hearing her name. Family says she has had similar episodes w/ electrolite imbalance before but this one seems to be lasting longer    They deny recent infection, sick contacts, urine habit change. No fever or chills recently    Was stroke activated in ED. See vascular recs as per thiir note     ROS:   12pt ROS negative other then mentioned above    Histories:     Allergies:  Iodine and iodide containing products and Nifedipine    Current Medications:    Current Facility-Administered Medications   Medication Dose Route Frequency Provider Last Rate Last Admin    ascorbic acid (vitamin C) tablet 1,000 mg  1,000 mg Oral Daily Brayan Frye, NP        aspirin chewable tablet 81 mg  81 mg Oral QHS Brayan Frye, SHELBI        bisacodyL suppository 10 mg  10 mg Rectal Daily PRN Brayan Frye, SHELBI        calcium carbonate 200 mg calcium (500 mg) chewable tablet 1,000 mg   1,000 mg Oral BID Brayan Frye, SHELBI        clopidogreL tablet 300 mg  300 mg Oral Once Brayan Frye, SHELBI        [START ON 4/21/2024] clopidogreL tablet 75 mg  75 mg Oral Daily Brayan Frye, SHELBI        cyanocobalamin tablet 1,000 mcg  1,000 mcg Oral Daily Brayan Frye, NP        dextrose 10% bolus 125 mL 125 mL  12.5 g Intravenous PRN Brayan Frye, NP        dextrose 10% bolus 250 mL 250 mL  25 g Intravenous PRN Brayan Frye, NP        furosemide tablet 80 mg  80 mg Oral BID Brayan Frye, NP        heparin (porcine) injection 5,000 Units  5,000 Units Subcutaneous Q8H Brayan Frye, SHELBI        iohexoL (OMNIPAQUE 350) injection 100 mL  100 mL Intravenous ONCE PRN Salinas Tyson MD        labetaloL injection 10 mg  10 mg Intravenous Q6H PRN Brayan Frye, SHELBI        montelukast tablet 10 mg  10 mg Oral Daily Brayan Frye, SHELBI        pravastatin tablet 40 mg  40 mg Oral QHS Brayan Frye, SEHLBI        sodium bicarbonate tablet 650 mg  650 mg Oral TID Brayan Frye, SHELBI        sodium chloride 0.9% bolus 500 mL 500 mL  500 mL Intravenous PRN Brayan Frye, SHELBI        sodium chloride 0.9% flush 10 mL  10 mL Intravenous PRN Brayan Frye, NP        vitamin D 1000 units tablet 2,000 Units  2,000 Units Oral Daily Brayan Frye, SHELBI             Past Medical/Surgical/Family/Social History:  PMHx:   Past Medical History:   Diagnosis Date    Acute encephalopathy 02/29/2024    Acute hypoxemic respiratory failure 12/19/2019    Acute right-sided thoracic back pain 12/09/2019    Allergy     Asthma     Basal cell carcinoma     left forehead    Basal cell carcinoma     left nose    Basal cell carcinoma 05/20/2015    right nose    Basal cell carcinoma 12/22/2015    left lower post neck    Basal cell carcinoma 12/03/2019     left ant scalp     BCC (basal cell carcinoma of skin) 10/20/2022    Right alar groove    Bilateral pleural effusion     Bilateral renal cysts     Breast cancer     CAD (coronary artery disease)     Cardiomyopathy     Cardiomyopathy, ischemic     Cataract     Chest pain 03/04/2024    CHF (congestive heart failure)     CKD (chronic kidney disease) stage 4, GFR 15-29 ml/min     Colon polyp 2011    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, with long-term current use of insulin 02/22/2018    COPD (chronic obstructive pulmonary disease)     COPD exacerbation 04/08/2018    Current mild episode of major depressive disorder without prior episode 01/25/2022    Defibrillator discharge     Dependence on renal dialysis 08/22/2023    Diabetes mellitus     Diabetes mellitus type II     Diabetes with neurologic complications     Edema     ESRD needing dialysis     Goals of care, counseling/discussion 07/08/2022    Goiter     MNG    Hematuria, unspecified     HX: breast cancer     Hyperlipidemia     Hypertension     Hypocalcemia     Hypokalemia     Hyponatremia     Hyponatremia 04/02/2022    Iron deficiency anemia 05/16/2017    Iron deficiency anemia     Iron deficiency anemia     Left kidney mass     Meningioma     Microalbuminuria due to type 2 diabetes mellitus 01/26/2022    Osteoporosis, postmenopausal     Pneumonia 12/08/2019    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    Proteinuria 01/21/2019    Pseudomonas pneumonia     SCC (squamous cell carcinoma) 08/25/2022    right posterior neck    Skin cancer     s/p excision    Sleep apnea     CPAP    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/2015    mid forehead    Unspecified vitamin D deficiency     UTI (urinary tract infection) 04/02/2022    Ventricular tachycardia     Vitamin B12 deficiency     Vitamin D deficiency disease       Surgeries:   Past Surgical History:   Procedure Laterality Date    BASAL CELL  CARCINOMA EXCISION      posterior neck and nose    BREAST BIOPSY      BREAST CYST EXCISION Left     BREAST SURGERY      CARDIAC DEFIBRILLATOR PLACEMENT      x 2    CATARACT EXTRACTION W/  INTRAOCULAR LENS IMPLANT Bilateral     CHOLECYSTECTOMY      COLONOSCOPY N/A 11/5/2019    Procedure: COLONOSCOPY;  Surgeon: Boaz Botello MD;  Location: [facility] ([address] [place] [address]);  Service: Endoscopy;  Laterality: N/A;  AICD - [employer]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    FRACTURE SURGERY      left elbow and wrist as a child    HYSTERECTOMY      INSERTION, CATHETER, DIALYSIS, PERITONEAL, LAPAROSCOPIC N/A 4/25/2023    Procedure: INSERTION, CATHETER, DIALYSIS, PERITONEAL, LAPAROSCOPIC;  Surgeon: Marcelo Isaac MD;  Location: [facility];  Service: General;  Laterality: N/A;    LAPAROSCOPIC LYSIS OF ADHESIONS N/A 4/25/2023    Procedure: LYSIS, ADHESIONS, LAPAROSCOPIC;  Surgeon: Marcelo Isaac MD;  Location: [facility] OR;  Service: General;  Laterality: N/A;    MASTECTOMY Right     OMENTOPEXY, LAPAROSCOPIC N/A 4/25/2023    Procedure: OMENTOPEXY, LAPAROSCOPIC;  Surgeon: Marcelo Isaac MD;  Location: [facility] OR;  Service: General;  Laterality: N/A;    REPLACEMENT OF IMPLANTABLE CARDIOVERTER-DEFIBRILLATOR (ICD) GENERATOR N/A 12/17/2018    Procedure: REPLACEMENT, ICD GENERATOR;  Surgeon: Jan Mckeon MD;  Location: [facility] LAB;  Service: Cardiology;  Laterality: N/A;  DONNA, CRT-D gen change, MDT, MAC, SK, 3 Prep    REVISION OF SKIN POCKET FOR CARDIOVERTER-DEFIBRILLATOR  12/17/2018    Procedure: Revision, Skin Pocket, For Cardioverter-Defibrillator;  Surgeon: Jan Mckeon MD;  Location: [facility] LAB;  Service: Cardiology;;    SQUAMOUS CELL CARCINOMA EXCISION      remved from forehead    TONSILLECTOMY        Family  Hx:   Family History   Problem Relation Name Age of Onset    Asthma Mother      Hypertension Mother      Stroke Mother      Diabetes Father      Cardiomyopathy Father      Diabetes Sister x1     Heart disease  Sister x1     Heart attack Sister x1     Heart attack Brother Jarek     Diabetes Brother Jarek     Heart disease Brother Jarek     Hypertension Brother Jarek     Diabetes Brother Yee     Heart disease Brother Yee     Hypertension Brother Yee     Cerebral aneurysm Brother Yee     Diabetes Brother      Heart disease Brother      Cancer Brother          colon    Diabetes Brother      Diabetes Daughter x1         prediabetes    Cancer Daughter x1         melanoma    Obesity Daughter x1     Melanoma Daughter x1     Cancer Son          skin    Diabetes Son          prediabetes    Diabetes Son      No Known Problems Maternal Grandmother      No Known Problems Maternal Grandfather      No Known Problems Paternal Grandmother      No Known Problems Paternal Grandfather      Amblyopia Neg Hx      Blindness Neg Hx      Cataracts Neg Hx      Glaucoma Neg Hx      Macular degeneration Neg Hx      Retinal detachment Neg Hx      Strabismus Neg Hx      Thyroid disease Neg Hx        Social Hx:   Social History     Tobacco Use    Smoking status: Never     Passive exposure: Never    Smokeless tobacco: Never   Substance Use Topics    Alcohol use: No     Alcohol/week: 0.0 standard drinks of alcohol    Drug use: No         Current Evaluation:     Vital Signs:   Vitals:    04/20/24 1236   BP: (!) 181/86   Pulse: 95   Resp: 20   Temp: 97.8 °F (36.6 °C)        General Exam  No apparent distress  Orientation  Awake, nods when she hears her name, not following other commands and gets agitated if touched  Language  Nonverbal at this time, nodding intermittently  Cranial Nerves  EOMI(L gaze preference but crosses midline), symmetric facial expression, tongue midline  Motor  Resisting exam   All extremities antigravity, moves intermittently  Sensory  Withdraws from pain in all 4 extremities  Cerebellar/Gait  Unable to test      LABORATORY STUDIES:  Labs:  Recent Labs   Lab 04/20/24  0839   WBC 10.23   HGB 11.1*   HCT 34.8*       MCV 95       Recent Labs   Lab 04/20/24  0839      K 4.3   CL 99   CO2 21*   BUN 67*   *   CALCIUM 8.2*   PROT 6.5   ALBUMIN 2.4*   BILITOT 0.2   AST 17   ALKPHOS 166*   ALT 14       Recent Labs   Lab 04/20/24  0839 04/20/24  0839   INR 1.1 1.1       Recent Labs   Lab 04/20/24  0839   *       Urine:   Lab Results   Component Value Date    LABURIN (A) 03/14/2024     CANDIDA ALBICANS  > 100,000 cfu/ml  Treatment of asymptomatic candiduria is not recommended (except for   specific populations). Candida isolated in the urine typically   represents colonization. If an indwelling urinary catheter is present  it should be removed or replaced.      SPECGRAV 1.030 04/20/2024    NITRITE Negative 04/20/2024    KETONESU Negative 04/20/2024    UROBILINOGEN Negative 04/20/2024    WBCUA 1 04/20/2024       Recent Labs   Lab 04/20/24  0839   LDLCALC 28.4*       Thyroid:   Recent Labs   Lab 04/20/24  0839   TSH 0.997       FLP:   Recent Labs   Lab 04/20/24  0839   CHOL 100*   HDL 54   LDLCALC 28.4*   TRIG 88   CHOLHDL 54.0*       Cardiac markers:  Recent Labs   Lab 04/20/24  0839 04/20/24  1221   TROPONINI 0.064* 0.097*       RADIOLOGY STUDIES:  Imaging Results              X-Ray Chest AP Portable (Final result)  Result time 04/20/24 09:26:29      Final result by Ronal Henry MD (04/20/24 09:26:29)                   Impression:      Interstitial and alveolar opacities have progressed since the 03/12/2024 radiograph and may relate to edema, noting that infectious or noninfectious inflammatory etiology may peers similar.    Left greater than right pleural effusions.    Increased conspicuity of slightly nodular opacity at the left lateral mid lung zone which could relate to loculated pleural fluid given appearance on prior chest CT versus infectious or noninfectious inflammatory airspace consolidation.  Attention on follow-up to exclude the possibility of neoplasm recommended.      Electronically signed  by: Ronal Henry  Date:    04/20/2024  Time:    09:26               Narrative:    EXAMINATION:  XR CHEST AP PORTABLE    CLINICAL HISTORY:  Stroke;    TECHNIQUE:  Single frontal view of the chest was performed.    COMPARISON:  Chest radiograph performed 03/12/2024.    FINDINGS:  Monitoring leads overlie the chest.  Left subclavian approach AICD.    Grossly unchanged cardiomediastinal contours, again noting enlargement the cardiac silhouette and prominence of central pulmonary vasculature.    Interstitial alveolar opacities have progressed since 03/12/2024 examination.  Persistent dense retrocardiac and left mid lower lung zone opacity.  More focal nodular opacity measuring on the order of 30 mm is noted in the left lateral hemithorax.  Small right and at least moderate left pleural effusions are noted.    No definite pneumothorax.    No acute findings in the visualized abdomen.  Vascular calcifications postoperative sequela project in the upper abdomen.    Osseous and soft tissue structures appear without definite acute change.                                       CT Head Without Contrast (Final result)  Result time 04/20/24 08:52:46      Final result by Ran Jain MD (04/20/24 08:52:46)                   Impression:      No acute intracranial pathology.  If concern persists for acute ischemic event, consider MRI brain for further evaluation if patient compatible.    Sequela of chronic microvascular ischemic change.    Stable mainly calcified extra-axial lesion along the left frontal convexity, presumed meningioma.      Electronically signed by: Ran Jain  Date:    04/20/2024  Time:    08:52               Narrative:    EXAMINATION:  CT HEAD WITHOUT CONTRAST    CLINICAL HISTORY:  Neuro deficit, acute, stroke suspected;    TECHNIQUE:  Low dose axial CT images obtained throughout the head without intravenous contrast. Sagittal and coronal reconstructions were performed.    COMPARISON:  CTA head and neck  02/29/2024, CT head without contrast 02/29/2024.    FINDINGS:  Intracranial compartment:    Prominence of the ventricles with compensatory enlargement of the sulci, in keeping with central volume loss.  No extra-axial blood or fluid collections.    Diffuse scattered patchy and confluent regions of subcortical and periventricular T2/FLAIR hyperintense signal, overall nonspecific, but can be seen in the setting of microvascular ischemic change.  No parenchymal mass, hemorrhage, edema or major vascular distribution infarct.    Mainly calcified extra-axial lesion along the left frontal convexity measuring approximately 2.4 x 1.3 cm, presumed meningioma.    Skull/extracranial contents (limited evaluation): No fracture. Scattered patchy mucosal thickening about the paranasal sinuses with near complete opacification of the left maxillary and sphenoid sinus, noting surrounding osseous thickening and sclerosis, likely denoting component of chronic sinusitis.    Vascular calcification about the skull base.  Hyperdense appearance of the basilar artery may relate to diffuse streak artifact or surrounding calcific atherosclerosis.                                      Assessment/Plan:     Myesha Quinones is a 83 y.o. female with PMH of DM, HTN, CKD on PD, NICM, CAD, LBBB w/ CRT-D who presents for encephalopathy x 2days. Medical workup in progress     - Pt has MRI non compatible device so would recommend repeating CTH in 6-12 hrs if concern for stroke remains   - CTA held by nephrology. Might need to discuss need and options since we can not obtain any other vessel imaging other than US carotid (which does not assesses intractranial vessels and post circulation)  - Workup for toxic/metabolic/infectious abnormalities per primary  - If encephalopathy does not resolve consider EEG    Differential diagnosis was explained to the patient. All questions were answered. Patient understood and agreed to adhere to plan.     No further  intervention indicated at this time from Neurology Service. Please call back if any further questions or any changes in neurologic condition.    Case to be discussed with Dr. Bro    Electronically signed by:   Iesha Tinoco MD   4/20/2024 1:23 PM

## 2024-04-20 NOTE — ASSESSMENT & PLAN NOTE
- echo 3/1/2024 with EF 52% and mild AS; history of NICM since 2004 with AICD in place   - monitor

## 2024-04-20 NOTE — ASSESSMENT & PLAN NOTE
-altered on exam--family states the patients baseline is AAOx3 and ambulatory  -Head CT -No acute intracranial pathology  -code stroke activated in the ED  -[facility] neuro also consulted  -Nephrology advised against CTA head and neck --Per nephrology:really would try to avoid any type of contrast in order to try to preserve her residual renal function.  -carotid u/s pending.  -Head CT will be ordered tonight as recommended by neuro if mental status has not improved.  -EEG pending  -ICD in place-unable to obtain Brain MRI   -cont delirium precautions

## 2024-04-20 NOTE — PROGRESS NOTES
VN NOTE   VN entered room via [facility]. Daughter and sons at bedside. Patient is lying in bed. NAD at this time. Family states that patient was normal prior to 9:45 PM last night. She had been independent at home. Patient remains with eyes closed and does not follow commands, according to family members. Instructed family to contact the bedside nurse, should they need anything. Verbalized understanding.

## 2024-04-20 NOTE — PROGRESS NOTES
VIRTUAL NURSE: Pt arrived to unit earlier. Patient has altered mental status currently. Family at bedside. VN has attempted several times to enter room and each time was told that it wasn't a good time. Nurse stated that it's been very busy in the room. VN will attempt again, once nurse says it's okay to enter the room.    Labs, notes, orders, and careplan reviewed.

## 2024-04-20 NOTE — ASSESSMENT & PLAN NOTE
Chronic, uncontrolled. Latest blood pressure and vitals reviewed-     Temp:  [97.8 °F (36.6 °C)-97.9 °F (36.6 °C)]   Pulse:  [61-95]   Resp:  [16-35]   BP: (181-218)/(82-92)   SpO2:  [90 %-99 %] .   Home meds for hypertension were reviewed and noted below.   Hypertension Medications               furosemide (LASIX) 80 MG tablet Take 1 tablet (80 mg total) by mouth 2 (two) times a day.    olmesartan (BENICAR) 5 MG Tab Take 5 mg by mouth once daily.    nitroGLYCERIN (NITROSTAT) 0.4 MG SL tablet Place 0.4 mg under the tongue every 5 (five) minutes as needed for Chest pain.             Hold b/p meds 2/2 permissive HTN.    Will utilize p.r.n. blood pressure medication as needed.

## 2024-04-20 NOTE — ACP (ADVANCE CARE PLANNING)
Advance Care Planning     Date: 04/20/2024    Code Status  In light of the patients advanced and life limiting illness,I engaged the the family in a voluntary conversation about the patient's preferences for care  at the very end of life. The patient wishes to have a natural, peaceful death.  Along those lines, the patient does not wish to have CPR or other invasive treatments performed when her heart and/or breathing stops. I communicated to the family that a DNR order would be placed in her medical record to reflect this preference.  I spent a total of 30 minutes engaging the patient in this advance care planning discussion.    Advance Care Planning     Date: 04/20/2024    [facility]  I engaged the family in a voluntary conversation about advance care planning and we specifically addressed what the goals of care would be moving forward, in light of the patient's change in clinical status, specifically chronic conditions and AMS.  We did specifically address the patient's likely prognosis, which is poor.  We explored the patient's values and preferences for future care.  The family endorses that what is most important right now is to focus on comfort and QOL     Accordingly, we have decided that the best plan to meet the patient's goals includes  will cont DNR status at this time and cont treatment    I did explain the role for hospice care at this stage of the patient's illness, including its ability to help the patient live with the best quality of life possible.  We will not be making a hospice referral at this time, family wants to see if the patients mental status will turn around    I spent a total of 45 minutes engaging the patient in this advance care planning discussion.        Brayan Frye NP

## 2024-04-20 NOTE — H&P
[facility] Medicine  History & Physical    Patient Name: Myesha Quinones  MRN: [id number]  Patient Class: OP- Observation  Admission Date: 4/20/2024  Attending Physician: Geri Beltrán MD   Primary Care Provider: Dayton Michael MD         Patient information was obtained from relative(s), past medical records, and ER records.     Subjective:     Principal Problem:Acute encephalopathy    Chief Complaint:   Chief Complaint   Patient presents with    Altered Mental Status     Patient coming from home via [facility]. Altered mental status this morning when she woke up. Last know well last night when she went to bed but time unknown. History of Peritoneal dialysis daily and unsure if patient received dialysis last night. Glucose 137. Patient awake but not answering questions at this time.         HPI: Myesha Quinones is a 83-year-old F with a pmh of ESRD on PD, type 2 diabetes mellitus, hypertension, obstructive airway disease, CAD, breast cancer, and hyperlipidemia. She was recently hospitalized from 3/4/204-3/11/2024 with complaints of fatigue, lethargy, periodic involuntary jerks--was  found to have Pseudomonas pneumonia with uncomplicated parapneumonic effusion. Underwent left thoracentesis during this admission, pleural fluid cultures were negative. Respiratory cultures grew pan-sensitive Pseudomonas. Patient was seen by pulmonology and recommended 4-6 weeks of antibiotics for Pseudomonas pneumonia. Patient's symptoms improved but required supplemental oxygen at discharge. Discharged on levofloxacin 250 mg daily, end date 03/31 for 4 weeks therapy. Frequency of PD was increased from twice a week to daily as uremia thought to be contributing to periodic involuntary jerks. Was also admitted 3/12/2024-3/14/2024 for suspected syncope from hypotension combined with acute on chronic anemia. Antihypertensives were held at that time and furosemide was resumed.     Patient presented to the ED  with her son in law and daughter on this am with complaints of AMS. Per the daughter, Patient was seen well at about 9:30 last night as she was going to bed. She was found about 7:00 am this morning not responding. Daughter says she found her mother in bed with eyes open. She was unable to answer any questions. Daughter states at the patients baseline she is ambulatory, can complete her ADLs, and is oriented. Code stroke activated in the ED. Vascular neuro recommended head CT- No acute intracranial pathology.  If concern persists for acute ischemic event, consider MRI brain for further evaluation if patient compatible. Sequela of chronic microvascular ischemic change. Stable mainly calcified extra-axial lesion along the left frontal convexity, presumed meningioma. CTA head and neck ordered but held per Neuro. Unable to perform MRI brain 2/2 ICD. [facility] neuro has been consulted. Nephrology was consulted for peritoneal dialysis. Cardiology was also consulted as her troponin has been elevated this admission--no c/o CP. EKG with SR with PACs.    Her ED workup also includes a stable hemoglobin 11.1, CO2-21, anion gap-17, Cr-3.5, glucose 194, troponin 0.064>0.097, lactate 2.5, TSH 0.997. CXR- Interstitial and alveolar opacities have progressed since the 03/12/2024 radiograph and may relate to edema, noting that infectious or noninfectious inflammatory etiology may peers similar. Left greater than right pleural effusions. Increased conspicuity of slightly nodular opacity at the left lateral mid lung zone which could relate to loculated pleural fluid. No infection noted on U/A. Will admit [facility] the [facility] for further care.    Past Medical History:   Diagnosis Date    Acute encephalopathy 02/29/2024    Acute hypoxemic respiratory failure 12/19/2019    Acute right-sided thoracic back pain 12/09/2019    Allergy     Asthma     Basal cell carcinoma     left forehead    Basal cell carcinoma     left nose    Basal cell carcinoma  05/20/2015    right nose    Basal cell carcinoma 12/22/2015    left lower post neck    Basal cell carcinoma 12/03/2019    left ant scalp     BCC (basal cell carcinoma of skin) 10/20/2022    Right alar groove    Bilateral pleural effusion     Bilateral renal cysts     Breast cancer     CAD (coronary artery disease)     Cardiomyopathy     Cardiomyopathy, ischemic     Cataract     Chest pain 03/04/2024    CHF (congestive heart failure)     CKD (chronic kidney disease) stage 4, GFR 15-29 ml/min     Colon polyp 2011    Controlled type 2 diabetes mellitus with both eyes affected by mild nonproliferative retinopathy and macular edema, with long-term current use of insulin 02/22/2018    COPD (chronic obstructive pulmonary disease)     COPD exacerbation 04/08/2018    Current mild episode of major depressive disorder without prior episode 01/25/2022    Defibrillator discharge     Dependence on renal dialysis 08/22/2023    Diabetes mellitus     Diabetes mellitus type II     Diabetes with neurologic complications     Edema     ESRD needing dialysis     Goals of care, counseling/discussion 07/08/2022    Goiter     MNG    Hematuria, unspecified     HX: breast cancer     Hyperlipidemia     Hypertension     Hypocalcemia     Hypokalemia     Hyponatremia     Hyponatremia 04/02/2022    Iron deficiency anemia 05/16/2017    Iron deficiency anemia     Iron deficiency anemia     Left kidney mass     Meningioma     Microalbuminuria due to type 2 diabetes mellitus 01/26/2022    Osteoporosis, postmenopausal     Pneumonia 12/08/2019    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    Proteinuria 01/21/2019    Pseudomonas pneumonia     SCC (squamous cell carcinoma) 08/25/2022    right posterior neck    Skin cancer     s/p excision    Sleep apnea     CPAP    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/2015    mid forehead    Unspecified vitamin D deficiency     UTI (urinary tract infection) 04/02/2022    Ventricular tachycardia     Vitamin B12 deficiency      Vitamin D deficiency disease        Past Surgical History:   Procedure Laterality Date    BASAL CELL CARCINOMA EXCISION      posterior neck and nose    BREAST BIOPSY      BREAST CYST EXCISION Left     BREAST SURGERY      CARDIAC DEFIBRILLATOR PLACEMENT      x 2    CATARACT EXTRACTION W/  INTRAOCULAR LENS IMPLANT Bilateral     CHOLECYSTECTOMY      COLONOSCOPY N/A 11/5/2019    Procedure: COLONOSCOPY;  Surgeon: Boaz Botello MD;  Location: [facility] ENDO ([address] [place] [address]);  Service: Endoscopy;  Laterality: N/A;  AICD - [employer]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    FRACTURE SURGERY      left elbow and wrist as a child    HYSTERECTOMY      INSERTION, CATHETER, DIALYSIS, PERITONEAL, LAPAROSCOPIC N/A 4/25/2023    Procedure: INSERTION, CATHETER, DIALYSIS, PERITONEAL, LAPAROSCOPIC;  Surgeon: Marcelo Isaac MD;  Location: [facility] OR;  Service: General;  Laterality: N/A;    LAPAROSCOPIC LYSIS OF ADHESIONS N/A 4/25/2023    Procedure: LYSIS, ADHESIONS, LAPAROSCOPIC;  Surgeon: Marcelo Isaac MD;  Location: [facility] OR;  Service: General;  Laterality: N/A;    MASTECTOMY Right     OMENTOPEXY, LAPAROSCOPIC N/A 4/25/2023    Procedure: OMENTOPEXY, LAPAROSCOPIC;  Surgeon: Marcelo Isaac MD;  Location: [facility] OR;  Service: General;  Laterality: N/A;    REPLACEMENT OF IMPLANTABLE CARDIOVERTER-DEFIBRILLATOR (ICD) GENERATOR N/A 12/17/2018    Procedure: REPLACEMENT, ICD GENERATOR;  Surgeon: Jan Mckeon MD;  Location: [facility] LAB;  Service: Cardiology;  Laterality: N/A;  DONNA, CRT-D gen change, MDT, MAC, SK, 3 Prep    REVISION OF SKIN POCKET FOR CARDIOVERTER-DEFIBRILLATOR  12/17/2018    Procedure: Revision, Skin Pocket, For Cardioverter-Defibrillator;  Surgeon: Jan Mckeon MD;  Location: [facility] LAB;  Service: Cardiology;;    SQUAMOUS CELL CARCINOMA EXCISION      remved from forehead    TONSILLECTOMY         Review of patient's allergies indicates:   Allergen Reactions    Iodine and iodide containing products Hives and  Other (See Comments)     Not specified     Nifedipine      weakness       Current Facility-Administered Medications   Medication Dose Route Frequency Provider Last Rate Last Admin    albuterol-ipratropium 2.5 mg-0.5 mg/3 mL nebulizer solution 3 mL  3 mL Nebulization Q4H PRN Brayan Frye, NP        ascorbic acid (vitamin C) tablet 1,000 mg  1,000 mg Oral Daily Brayan Frye, NP        aspirin chewable tablet 81 mg  81 mg Oral QHS Brayan Frye, NP        bisacodyL suppository 10 mg  10 mg Rectal Daily PRN Brayan Frye, NP        calcium carbonate 200 mg calcium (500 mg) chewable tablet 1,000 mg  1,000 mg Oral BID Brayan Frye, NP        clopidogreL tablet 300 mg  300 mg Oral Once Brayan Frye, NP        [START ON 4/21/2024] clopidogreL tablet 75 mg  75 mg Oral Daily Brayan Frye, NP        cyanocobalamin tablet 1,000 mcg  1,000 mcg Oral Daily Brayan Frye, NP        dextrose 10% bolus 125 mL 125 mL  12.5 g Intravenous PRN Brayan Frye, NP        dextrose 10% bolus 250 mL 250 mL  25 g Intravenous PRN Brayan Frye, NP        furosemide tablet 80 mg  80 mg Oral BID Brayan Frye, NP        heparin (porcine) injection 5,000 Units  5,000 Units Subcutaneous Q8H Brayan Frye, NP        iohexoL (OMNIPAQUE 350) injection 100 mL  100 mL Intravenous ONCE PRN Salinas Tyson MD        labetaloL injection 10 mg  10 mg Intravenous Q6H PRN Brayan Frye, NP        LORazepam injection 2 mg  2 mg Intravenous Q4H PRN Brayan Frye, NP        montelukast tablet 10 mg  10 mg Oral Daily Brayan Frye, NP        ondansetron injection 4 mg  4 mg Intravenous Q6H PRN Brayan Frye, NP        pravastatin tablet 40 mg  40 mg Oral QHS Brayan Frye, NP        sodium bicarbonate tablet 650 mg   650 mg Oral TID Marcus-Brayan Henriquez, NP        sodium chloride 0.9% bolus 500 mL 500 mL  500 mL Intravenous PRN Marcus-Brayan Henriquez, NP        sodium chloride 0.9% flush 10 mL  10 mL Intravenous PRN Marcus-Brayan Henriquez, NP        vitamin D 1000 units tablet 2,000 Units  2,000 Units Oral Daily Marcus-Brayan Henriquez, NP         Family History       Problem Relation (Age of Onset)    Asthma Mother    Cancer Brother, Daughter, Son    Cardiomyopathy Father    Cerebral aneurysm Brother    Diabetes Father, Sister, Brother, Brother, Brother, Brother, Daughter, Son, Son    Heart attack Sister, Brother    Heart disease Sister, Brother, Brother, Brother    Hypertension Mother, Brother, Brother    Melanoma Daughter    No Known Problems Maternal Grandmother, Maternal Grandfather, Paternal Grandmother, Paternal Grandfather    Obesity Daughter    Stroke Mother          Tobacco Use    Smoking status: Never     Passive exposure: Never    Smokeless tobacco: Never   Substance and Sexual Activity    Alcohol use: No     Alcohol/week: 0.0 standard drinks of alcohol    Drug use: No    Sexual activity: Yes     Partners: Male     Review of Systems   Unable to perform ROS: Acuity of condition     Objective:     Vital Signs (Most Recent):  Temp: 97.8 °F (36.6 °C) (04/20/24 1236)  Pulse: 95 (04/20/24 1236)  Resp: 20 (04/20/24 1236)  BP: (!) 181/86 (04/20/24 1236)  SpO2: 97 % (04/20/24 1236) Vital Signs (24h Range):  Temp:  [97.8 °F (36.6 °C)-97.9 °F (36.6 °C)] 97.8 °F (36.6 °C)  Pulse:  [61-95] 95  Resp:  [16-35] 20  SpO2:  [90 %-99 %] 97 %  BP: (181-218)/(82-92) 181/86     Weight: 55.6 kg (122 lb 9.2 oz)  Body mass index is 21.71 kg/m².     Physical Exam  Vitals and nursing note reviewed.   Constitutional:       Appearance: She is ill-appearing.      Comments: Frail elderly female   HENT:      Head: Normocephalic and atraumatic.      Nose: Nose normal.      Mouth/Throat:      Mouth: Mucous membranes are moist.    Eyes:      Extraocular Movements: Extraocular movements intact.   Cardiovascular:      Rate and Rhythm: Normal rate and regular rhythm.      Pulses: Normal pulses.      Heart sounds: Normal heart sounds.   Abdominal:      General: There is no distension.      Tenderness: There is no abdominal tenderness. There is no guarding.      Comments: +PD catheter   Musculoskeletal:      Cervical back: Normal range of motion and neck supple.   Skin:     General: Skin is warm and dry.      Capillary Refill: Capillary refill takes 2 to 3 seconds.      Findings: Bruising and rash present.   Neurological:      Mental Status: She is disoriented.      Motor: Weakness present.                Significant Labs: All pertinent labs within the past 24 hours have been reviewed.    Significant Imaging: I have reviewed all pertinent imaging results/findings within the past 24 hours.  Assessment/Plan:     * Sepsis  This patient does have evidence of infective focus  My overall impression is sepsis.  Source: Respiratory  Antibiotics given-   Antibiotics (72h ago, onward)      None          Latest lactate reviewed-  Recent Labs   Lab 04/20/24  1303   LACTATE 2.5*     Organ dysfunction indicated by Encephalopathy    Fluid challenge Not needed - patient is not hypotensive      Post- resuscitation assessment No - Post resuscitation assessment not needed       Will Not start Pressors- Levophed for MAP of 65  Source control achieved by: IV abx    Nausea and vomiting  -prn IV antiemetics ordered      Focal neurological deficit  -code stroke activated in the ED  -appreciate vascular neuro eval  -[facility] neuro consulted and plan is below:    - Pt has MRI non compatible device so would recommend repeating CTH in 6-12 hrs if concern for stroke remains   - CTA held by nephrology. Might need to discuss need and options since we can not obtain any other vessel imaging other than US carotid (which does not assesses intractranial vessels and post  circulation)  - Workup for toxic/metabolic/infectious abnormalities per primary  - If encephalopathy does not resolve consider EEG--EEG pending      Severe persistent chronic asthma without complication  -will continue Duonebs prn, Singulair-when she is able to take po, and Flonase. Per pulmonary outpatient she is no longer on maintenance inhalers.      Pleural effusion on left  -pleural fluid again removed while admitted back in March of 2024--studies were exudative with negative cytology.    -has heart failure as well as renal failure--on PD  -CXR on admission today (4/20)-Monitoring leads overlie the chest.  Left subclavian approach AICD. Grossly unchanged cardiomediastinal contours, again noting enlargement the cardiac silhouette and prominence of central pulmonary vasculature. Interstitial alveolar opacities have progressed since 03/12/2024 examination.  Persistent dense retrocardiac and left mid lower lung zone opacity.  More focal nodular opacity measuring on the order of 30 mm is noted in the left lateral hemithorax.  Small right and at least moderate left pleural effusions are noted. No definite pneumothorax. No acute findings in the visualized abdomen.  Vascular calcifications postoperative sequela project in the upper abdomen. Osseous and soft tissue structures appear without definite acute change.   -CT chest pending    Acute encephalopathy  -altered on exam--family states the patients baseline is AAOx3 and ambulatory  -Head CT -No acute intracranial pathology  -code stroke activated in the ED  -[facility] neuro also consulted  -Nephrology advised against CTA head and neck --Per nephrology:really would try to avoid any type of contrast in order to try to preserve her residual renal function.  -carotid u/s pending.  -Head CT will be ordered tonight as recommended by neuro if mental status has not improved.  -EEG pending  -ICD in place-unable to obtain Brain MRI   -cont delirium precautions       Elevated  troponin  -troponin 0.064>0.097 --trend  -cardiology consulted  -EKG noted with PVCs      ESRD on peritoneal dialysis  -nephrology consulted   -nephrology has requested cultures of peritoneal fluid  -monitor       ACP (advance care planning)  Advance Care Planning  Date: 04/20/2024     Code Status  In light of the patients advanced and life limiting illness,I engaged the the family in a voluntary conversation about the patient's preferences for care  at the very end of life. The patient wishes to have a natural, peaceful death.  Along those lines, the patient does not wish to have CPR or other invasive treatments performed when her heart and/or breathing stops. I communicated to the family that a DNR order would be placed in her medical record to reflect this preference.  I spent a total of 30 minutes engaging the patient in this advance care planning discussion.           Advance Care Planning  Date: 04/20/2024     [facility]  I engaged the family in a voluntary conversation about advance care planning and we specifically addressed what the goals of care would be moving forward, in light of the patient's change in clinical status, specifically chronic conditions and AMS.  We did specifically address the patient's likely prognosis, which is poor.  We explored the patient's values and preferences for future care.  The family endorses that what is most important right now is to focus on comfort and QOL      Accordingly, we have decided that the best plan to meet the patient's goals includes  will cont DNR status at this time and cont treatment     I did explain the role for hospice care at this stage of the patient's illness, including its ability to help the patient live with the best quality of life possible.  We will not be making a hospice referral at this time, family wants to see if the patients mental status will turn around     I spent a total of 45 minutes engaging the patient in this advance care planning  discussion.         Type 2 diabetes mellitus, with long-term current use of insulin  Patient's FSGs are uncontrolled due to hyperglycemia on current medication regimen.  Last A1c reviewed-   Lab Results   Component Value Date    HGBA1C 6.5 (H) 02/29/2024     Most recent fingerstick glucose reviewed-   Recent Labs   Lab 04/20/24  1103   POCTGLUCOSE 191*       Maintain anti-hyperglycemic dose as follows-   Antihyperglycemics (From admission, onward)      None          Hold Oral hypoglycemics while patient is in the hospital.  Holding insulins as she is NPO.        Essential hypertension  Chronic, uncontrolled. Latest blood pressure and vitals reviewed-     Temp:  [97.8 °F (36.6 °C)-97.9 °F (36.6 °C)]   Pulse:  [61-95]   Resp:  [16-35]   BP: (181-218)/(82-92)   SpO2:  [90 %-99 %] .   Home meds for hypertension were reviewed and noted below.   Hypertension Medications               furosemide (LASIX) 80 MG tablet Take 1 tablet (80 mg total) by mouth 2 (two) times a day.    olmesartan (BENICAR) 5 MG Tab Take 5 mg by mouth once daily.    nitroGLYCERIN (NITROSTAT) 0.4 MG SL tablet Place 0.4 mg under the tongue every 5 (five) minutes as needed for Chest pain.             Hold b/p meds 2/2 permissive HTN.    Will utilize p.r.n. blood pressure medication as needed.    Mycobacterium avium complex  -chronic   -she is followed by Dr. Maki outpatient  -will cont duo nebs as needed      ABPA (allergic bronchopulmonary aspergillosis)  -noted in the patients history  -cont duo nebs  -monitor      Major depression, recurrent, chronic  Patient has persistent depression which is unknown and is currently uncontrolled.She is not prescribed anti-depressant medications outpatient. We will not consult psychiatry at this time. Patient does not display psychosis at this time. Continue to monitor closely and adjust plan of care as needed.        Chronic combined systolic and diastolic heart failure  -TTE from 3/1/24:    Left Ventricle: The  left ventricle is normal in size. There is concentric remodeling. Septal motion is consistent with pacing. There is reduced systolic function. Biplane (2D) method of discs ejection fraction is 52%.    Right Ventricle: Normal right ventricular cavity size. Systolic function is borderline low. TAPSE is 1.68 cm. Pacemaker lead present in the ventricle.    Left Atrium: Left atrium is moderately dilated.    Right Atrium: Lead present in the right atrium.    Aortic Valve: There is mild aortic valve sclerosis. Mildly restricted motion. There is mild stenosis. Aortic valve area by VTI is 1.55 cm². Aortic valve peak velocity is 1.45 m/s. Mean gradient is 5 mmHg. The dimensionless index is 0.57.    Mitral Valve: There is moderate regurgitation.    Tricuspid Valve: There is moderate regurgitation.    Pulmonary Artery: The estimated pulmonary artery systolic pressure is 69 mmHg.    IVC/SVC: Intermediate venous pressure at 8 mmHg.    Pericardium: Left pleural effusion.    -holding lasix for now  -PD per nephrology   -TTE pending      Cardiomyopathy, ischemic  - echo 3/1/2024 with EF 52% and mild AS; history of NICM since 2004 with AICD in place   - monitor       COPD (chronic obstructive pulmonary disease)  Patient's COPD is controlled currently.  Patient is currently off COPD Pathway. Continue scheduled inhalers Supplemental oxygen and monitor respiratory status closely.     Iron deficiency anemia  Patient's anemia is currently controlled. Has not received any PRBCs to date. Etiology likely d/t chronic disease due to ESRD  Current CBC reviewed-   Lab Results   Component Value Date    HGB 11.1 (L) 04/20/2024    HCT 34.8 (L) 04/20/2024     Monitor serial CBC and transfuse if patient becomes hemodynamically unstable, symptomatic or H/H drops below 7/21.    Biventricular ICD (implantable cardioverter-defibrillator) in place  -noted        VTE Risk Mitigation (From admission, onward)           Ordered     heparin (porcine) injection  5,000 Units  Every 8 hours         04/20/24 1056     IP VTE HIGH RISK PATIENT  Once         04/20/24 1056     Place sequential compression device  Until discontinued         04/20/24 1056                         On 04/20/2024, patient should be placed in hospital observation services under my care in collaboration with Dr. Geri Beltrán.         04/20/24 1255   Manual Peritoneal Dialysis   Manual Drain Volume (mL) 2100 mL   Effluent Appearance Clear   Manual Treatment Comments drain complete, sample sent to lab         Brayan Frye NP  Department of [facility] Medicine  [facility]

## 2024-04-20 NOTE — CONSULTS
Pulm/CC Fellow Consult Note    Attending Physician: Geri Beltrán MD    Date of Admit: 4/20/2024  Hospital day: 0    Reason for Consult: Pleural Effusion    History of Present Illness:  Myesha Quinones is a 83 y.o.  female with a PMHx of ESRD on PD, T2DM, COPD, and chronic pleural effusion who presented to [facility] on 4/20/2024 with altered mental status. Patient unable to participate in interview so history provided by family at the bedside and chart review. The patient was recently discharged on 3/14/2024 with a pseudomonas pneumonia and uncomplicated parapneumonic effusion. On discharge patient was doing well and followed up with pulmonology as an outpatient. Pleural effusion was reduced following a thoracentesis at previous hospitalization and remained reduced. Patient was doing better at that time. She then presented today when her family noted she was altered. They stated yesterday she was in her normal state of health, able to handle her ADLs the night prior to presentation. At 7 am she was minimally responsive to her daughter. She was brought to the ED and initial CT head was negative. CTA was held due to concern for contrast in setting of renal disease. Pulmonology was consulted for pleural effusion.     I assessed the patient with the proactive rounding ICU RN due to worsening mental status and concern for decompensation. Patient was not interactive, agitated in bed.    Past Medical History:  Past Medical History:   Diagnosis Date    Acute encephalopathy 02/29/2024    Acute hypoxemic respiratory failure 12/19/2019    Acute right-sided thoracic back pain 12/09/2019    Allergy     Asthma     Basal cell carcinoma     left forehead    Basal cell carcinoma     left nose    Basal cell carcinoma 05/20/2015    right nose    Basal cell carcinoma 12/22/2015    left lower post neck    Basal cell carcinoma 12/03/2019    left ant scalp     BCC (basal cell carcinoma of skin) 10/20/2022    Right alar  groove    Bilateral pleural effusion     Bilateral renal cysts     Breast cancer     CAD (coronary artery disease)     Cardiomyopathy     Cardiomyopathy, ischemic     Cataract     Chest pain 03/04/2024    CHF (congestive heart failure)     CKD (chronic kidney disease) stage 4, GFR 15-29 ml/min     Colon polyp 2011    Controlled type 2 diabetes mellitus with both eyes affected by mild nonproliferative retinopathy and macular edema, with long-term current use of insulin 02/22/2018    COPD (chronic obstructive pulmonary disease)     COPD exacerbation 04/08/2018    Current mild episode of major depressive disorder without prior episode 01/25/2022    Defibrillator discharge     Dependence on renal dialysis 08/22/2023    Diabetes mellitus     Diabetes mellitus type II     Diabetes with neurologic complications     Edema     ESRD needing dialysis     Goals of care, counseling/discussion 07/08/2022    Goiter     MNG    Hematuria, unspecified     HX: breast cancer     Hyperlipidemia     Hypertension     Hypocalcemia     Hypokalemia     Hyponatremia     Hyponatremia 04/02/2022    Iron deficiency anemia 05/16/2017    Iron deficiency anemia     Iron deficiency anemia     Left kidney mass     Meningioma     Microalbuminuria due to type 2 diabetes mellitus 01/26/2022    Osteoporosis, postmenopausal     Pneumonia 12/08/2019    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    Proteinuria 01/21/2019    Pseudomonas pneumonia     SCC (squamous cell carcinoma) 08/25/2022    right posterior neck    Skin cancer     s/p excision    Sleep apnea     CPAP    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/2015    mid forehead    Unspecified vitamin D deficiency     UTI (urinary tract infection) 04/02/2022    Ventricular tachycardia     Vitamin B12 deficiency     Vitamin D deficiency disease        Past Surgical History:  Past Surgical History:   Procedure Laterality Date    BASAL CELL CARCINOMA EXCISION      posterior neck and nose    BREAST BIOPSY       BREAST CYST EXCISION Left     BREAST SURGERY      CARDIAC DEFIBRILLATOR PLACEMENT      x 2    CATARACT EXTRACTION W/  INTRAOCULAR LENS IMPLANT Bilateral     CHOLECYSTECTOMY      COLONOSCOPY N/A 11/5/2019    Procedure: COLONOSCOPY;  Surgeon: Boaz Botello MD;  Location: [facility] ([address] [place] [address]);  Service: Endoscopy;  Laterality: N/A;  AICD - [employer] -     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    FRACTURE SURGERY      left elbow and wrist as a child    HYSTERECTOMY      INSERTION, CATHETER, DIALYSIS, PERITONEAL, LAPAROSCOPIC N/A 4/25/2023    Procedure: INSERTION, CATHETER, DIALYSIS, PERITONEAL, LAPAROSCOPIC;  Surgeon: Marcelo Isaac MD;  Location: [facility] OR;  Service: General;  Laterality: N/A;    LAPAROSCOPIC LYSIS OF ADHESIONS N/A 4/25/2023    Procedure: LYSIS, ADHESIONS, LAPAROSCOPIC;  Surgeon: Marcelo Isaac MD;  Location: [facility] OR;  Service: General;  Laterality: N/A;    MASTECTOMY Right     OMENTOPEXY, LAPAROSCOPIC N/A 4/25/2023    Procedure: OMENTOPEXY, LAPAROSCOPIC;  Surgeon: Marcleo Isaac MD;  Location: [facility] OR;  Service: General;  Laterality: N/A;    REPLACEMENT OF IMPLANTABLE CARDIOVERTER-DEFIBRILLATOR (ICD) GENERATOR N/A 12/17/2018    Procedure: REPLACEMENT, ICD GENERATOR;  Surgeon: Jan Mckeon MD;  Location: [facility] EP LAB;  Service: Cardiology;  Laterality: N/A;  DONNA, CRT-D gen change, MDT, MAC, SK, 3 Prep    REVISION OF SKIN POCKET FOR CARDIOVERTER-DEFIBRILLATOR  12/17/2018    Procedure: Revision, Skin Pocket, For Cardioverter-Defibrillator;  Surgeon: Jan Mckeon MD;  Location: [facility] EP LAB;  Service: Cardiology;;    SQUAMOUS CELL CARCINOMA EXCISION      remved from forehead    TONSILLECTOMY         Allergies:  Review of patient's allergies indicates:   Allergen Reactions    Iodine and iodide containing products Hives and Other (See Comments)     Not specified     Nifedipine      weakness       Family History:  Family History   Problem Relation Name Age of Onset    Asthma Mother       "Hypertension Mother      Stroke Mother      Diabetes Father      Cardiomyopathy Father      Diabetes Sister x1     Heart disease Sister x1     Heart attack Sister x1     Heart attack Brother Jarek     Diabetes Brother Jarek     Heart disease Brother Jarek     Hypertension Brother Jarek     Diabetes Brother Yee     Heart disease Brother Yee     Hypertension Brother Yee     Cerebral aneurysm Brother Yee     Diabetes Brother      Heart disease Brother      Cancer Brother          colon    Diabetes Brother      Diabetes Daughter x1         prediabetes    Cancer Daughter x1         melanoma    Obesity Daughter x1     Melanoma Daughter x1     Cancer Son          skin    Diabetes Son          prediabetes    Diabetes Son      No Known Problems Maternal Grandmother      No Known Problems Maternal Grandfather      No Known Problems Paternal Grandmother      No Known Problems Paternal Grandfather      Amblyopia Neg Hx      Blindness Neg Hx      Cataracts Neg Hx      Glaucoma Neg Hx      Macular degeneration Neg Hx      Retinal detachment Neg Hx      Strabismus Neg Hx      Thyroid disease Neg Hx         Social History:  Social History     Tobacco Use    Smoking status: Never     Passive exposure: Never    Smokeless tobacco: Never   Substance Use Topics    Alcohol use: No     Alcohol/week: 0.0 standard drinks of alcohol    Drug use: No       Review of Systems:  Review of Systems   Unable to perform ROS: Acuity of condition        Objective:   Last 24 Hour Vital Signs:  BP  Min: 175/94  Max: 218/88  Temp  Av.6 °F (37.6 °C)  Min: 97.8 °F (36.6 °C)  Max: 101.3 °F (38.5 °C)  Pulse  Av.6  Min: 61  Max: 95  Resp  Av.7  Min: 16  Max: 35  SpO2  Av %  Min: 90 %  Max: 99 %  Height  Av' 3" (160 cm)  Min: 5' 3" (160 cm)  Max: 5' 3" (160 cm)  Weight  Av.7 kg (122 lb 13.7 oz)  Min: 55.3 kg (122 lb)  Max: 56.2 kg (124 lb)  Body mass index is 21.61 kg/m².  I & O (Last 24H):No intake or output data in the " "24 hours ending 04/20/24 1750    Physical Exam:  GEN: Agitated, uncomfortable in bed. Not interactive  HEENT: MMM, no scleral icterus, EOMI  NECK: Supple, midline trachea, no raised JVP or a-waves notable  CV: RRR, no MRG, pulses equal and symmetric 2+ at radial  PULM: Crackles bilaterally. Decreased breath sounds at left lung base  ABDOMEN: Soft, non-tender, non-distended, no rebound or guarding  SKIN: Warm, dry, intact, no rashes  MSK: No deformity, no clubbing, cyanosis, or lower extremity edema  NEURO: Rass +2, patient not responsive, but moving all extremities spontanously  LINES: Intact, no extravasation or induration, no erythema to PIV or support devices       Assessment & Plan:   Pleural Effusion  Acute Metabolic Encephalopathy    Patient has had a recurrent pleural effusion, most recently exudative while patient was admitted in 3/2024 with pseudomonas pneumonia. Her volume status has been difficult to manage between CHF and PD and she has now gone to PD 7 days per week. Discussed with family at bedside as patient is not interactive at this time.    They stated that the patient has been consistent with her wishes in the past. Recently has been discussing wishing to "die and go be with her " after her upcomming birthday. She has had a lot of difficulty PD daily and she has been telling her family that she wished she had not started it. We discussed reasonable goals of care. Given she is febrile antibiotic therapy is reasonable. Patient would not want escalation to pressor support if necessary or invasive procedures. As such a repeat thoracentesis would not be in her goals of care. The family wishes her to be comfortable first and foremost and if she does not have a meaningful recovery with antibiotics they would want to pursue hospice care.       Attestation to follow. Please contact me with any questions should they arise.     Amol Cronin MD  Pulm/Critical Care Fellow       "

## 2024-04-20 NOTE — ASSESSMENT & PLAN NOTE
-TTE from 3/1/24:    Left Ventricle: The left ventricle is normal in size. There is concentric remodeling. Septal motion is consistent with pacing. There is reduced systolic function. Biplane (2D) method of discs ejection fraction is 52%.    Right Ventricle: Normal right ventricular cavity size. Systolic function is borderline low. TAPSE is 1.68 cm. Pacemaker lead present in the ventricle.    Left Atrium: Left atrium is moderately dilated.    Right Atrium: Lead present in the right atrium.    Aortic Valve: There is mild aortic valve sclerosis. Mildly restricted motion. There is mild stenosis. Aortic valve area by VTI is 1.55 cm². Aortic valve peak velocity is 1.45 m/s. Mean gradient is 5 mmHg. The dimensionless index is 0.57.    Mitral Valve: There is moderate regurgitation.    Tricuspid Valve: There is moderate regurgitation.    Pulmonary Artery: The estimated pulmonary artery systolic pressure is 69 mmHg.    IVC/SVC: Intermediate venous pressure at 8 mmHg.    Pericardium: Left pleural effusion.    -holding lasix for now  -PD per nephrology   -TTE pending

## 2024-04-20 NOTE — ASSESSMENT & PLAN NOTE
Patient's FSGs are uncontrolled due to hyperglycemia on current medication regimen.  Last A1c reviewed-   Lab Results   Component Value Date    HGBA1C 6.5 (H) 02/29/2024     Most recent fingerstick glucose reviewed-   Recent Labs   Lab 04/20/24  1103   POCTGLUCOSE 191*       Maintain anti-hyperglycemic dose as follows-   Antihyperglycemics (From admission, onward)      None          Hold Oral hypoglycemics while patient is in the hospital.  Holding insulins as she is NPO.

## 2024-04-20 NOTE — ASSESSMENT & PLAN NOTE
Advance Care Planning  Date: 04/20/2024     Code Status  In light of the patients advanced and life limiting illness,I engaged the the family in a voluntary conversation about the patient's preferences for care  at the very end of life. The patient wishes to have a natural, peaceful death.  Along those lines, the patient does not wish to have CPR or other invasive treatments performed when her heart and/or breathing stops. I communicated to the family that a DNR order would be placed in her medical record to reflect this preference.  I spent a total of 30 minutes engaging the patient in this advance care planning discussion.           Advance Care Planning  Date: 04/20/2024     [facility]  I engaged the family in a voluntary conversation about advance care planning and we specifically addressed what the goals of care would be moving forward, in light of the patient's change in clinical status, specifically chronic conditions and AMS.  We did specifically address the patient's likely prognosis, which is poor.  We explored the patient's values and preferences for future care.  The family endorses that what is most important right now is to focus on comfort and QOL      Accordingly, we have decided that the best plan to meet the patient's goals includes  will cont DNR status at this time and cont treatment     I did explain the role for hospice care at this stage of the patient's illness, including its ability to help the patient live with the best quality of life possible.  We will not be making a hospice referral at this time, family wants to see if the patients mental status will turn around     I spent a total of 45 minutes engaging the patient in this advance care planning discussion.

## 2024-04-20 NOTE — ED NOTES
"Daughter at bedside   Reports pt went to bed at 2145 last night   Pt woke up altered unable to answer questions and "had a glazed look"   Per daughter   "

## 2024-04-20 NOTE — NURSING
"RAPID RESPONSE NURSE PROACTIVE ROUNDING NOTE     Time of Visit: 1620      Admit Date: 2024  LOS: 0  Code Status: DNR   Date of Visit: 2024  : 1940  Age: 83 y.o.  Sex: female  Race: White  Bed: K481/K481 A:   MRN: [id number]  Was the patient discharged from an ICU this admission? No   Was the patient discharged from a PACU within last 24 hours?  No  Did the patient receive conscious sedation/general anesthesia in last 24 hours?  No  Was the patient in the ED within the past 24 hours?  Yes  Was the patient started on NIPPV within the past 24 hours?  No  Attending Physician: Geri Beltrán MD  Primary Service: Networked reference to record PCT     ASSESSMENT     Notified by bedside RN via phone call.  Reason for alert: AMS, N/V, family is concerned    Diagnosis: Sepsis    Abnormal Vital Signs: BP (!) 175/94 (BP Location: Left arm, Patient Position: Lying)   Pulse 69   Temp (!) 101.3 °F (38.5 °C) (Axillary)   Resp (!) 22   Ht 5' 3" (1.6 m)   Wt 55.3 kg (122 lb)   LMP  (LMP Unknown)   SpO2 (!) 94%   BMI 21.61 kg/m²      Clinical Issues: Neuro    Patient  has a past medical history of Acute encephalopathy, Acute hypoxemic respiratory failure, Acute right-sided thoracic back pain, Allergy, Asthma, Basal cell carcinoma, Basal cell carcinoma, Basal cell carcinoma, Basal cell carcinoma, Basal cell carcinoma, BCC (basal cell carcinoma of skin), Bilateral pleural effusion, Bilateral renal cysts, Breast cancer, CAD (coronary artery disease), Cardiomyopathy, Cardiomyopathy, ischemic, Cataract, Chest pain, CHF (congestive heart failure), CKD (chronic kidney disease) stage 4, GFR 15-29 ml/min, Colon polyp, Controlled type 2 diabetes mellitus with both eyes affected by mild nonproliferative retinopathy and macular edema, with long-term current use of insulin, COPD (chronic obstructive pulmonary disease), COPD exacerbation, Current mild episode of major depressive disorder without prior episode, " Defibrillator discharge, Dependence on renal dialysis, Diabetes mellitus, Diabetes mellitus type II, Diabetes with neurologic complications, Edema, ESRD needing dialysis, Goals of care, counseling/discussion, Goiter, Hematuria, unspecified, breast cancer, Hyperlipidemia, Hypertension, Hypocalcemia, Hypokalemia, Hyponatremia, Hyponatremia, Iron deficiency anemia, Iron deficiency anemia, Iron deficiency anemia, Left kidney mass, Meningioma, Microalbuminuria due to type 2 diabetes mellitus, Osteoporosis, postmenopausal, Pneumonia, Postinflammatory pulmonary fibrosis, Proteinuria, Pseudomonas pneumonia, SCC (squamous cell carcinoma), Skin cancer, Sleep apnea, Squamous cell carcinoma, Unspecified vitamin D deficiency, UTI (urinary tract infection), Ventricular tachycardia, Vitamin B12 deficiency, and Vitamin D deficiency disease.      Patient unable to follow commands, non verbal, VSS, Temp 101.3 axillary, nausea, and vomiting, diaphoretic, pt agitated and slightly combative and restless.     INTERVENTIONS/ RECOMMENDATIONS     Chest x-ray, IV lasix, blood cultures to be completed, tylenol suppository ordered and IV ativan for agitation    Discussed plan of care with RN, Anna MEDINA, Dr. Cronin, and attending team.    PHYSICIAN ESCALATION     Yes/No  Yes    Orders received and case discussed with Dr. Cronin .    Disposition: Remain in room 481.    FOLLOW-UP     Call back the Rapid Response Nurse, Delmy Kirk RN at [phone] for additional questions or concerns.

## 2024-04-20 NOTE — ASSESSMENT & PLAN NOTE
Patient has persistent depression which is unknown and is currently uncontrolled.She is not prescribed anti-depressant medications outpatient. We will not consult psychiatry at this time. Patient does not display psychosis at this time. Continue to monitor closely and adjust plan of care as needed.

## 2024-04-20 NOTE — ASSESSMENT & PLAN NOTE
This patient does have evidence of infective focus  My overall impression is sepsis.  Source: Respiratory  Antibiotics given-   Antibiotics (72h ago, onward)      None          Latest lactate reviewed-  Recent Labs   Lab 04/20/24  1303   LACTATE 2.5*     Organ dysfunction indicated by Encephalopathy    Fluid challenge Not needed - patient is not hypotensive      Post- resuscitation assessment No - Post resuscitation assessment not needed       Will Not start Pressors- Levophed for MAP of 65  Source control achieved by: IV abx

## 2024-04-20 NOTE — HPI
Myesha Quinones is a 83-year-old F with a pmh of ESRD on PD, type 2 diabetes mellitus, hypertension, obstructive airway disease, CAD, breast cancer, and hyperlipidemia. She was recently hospitalized from 3/4/204-3/11/2024 with complaints of fatigue, lethargy, periodic involuntary jerks--was  found to have Pseudomonas pneumonia with uncomplicated parapneumonic effusion. Underwent left thoracentesis during this admission, pleural fluid cultures were negative. Respiratory cultures grew pan-sensitive Pseudomonas. Patient was seen by pulmonology and recommended 4-6 weeks of antibiotics for Pseudomonas pneumonia. Patient's symptoms improved but required supplemental oxygen at discharge. Discharged on levofloxacin 250 mg daily, end date 03/31 for 4 weeks therapy. Frequency of PD was increased from twice a week to daily as uremia thought to be contributing to periodic involuntary jerks. Was also admitted 3/12/2024-3/14/2024 for suspected syncope from hypotension combined with acute on chronic anemia. Antihypertensives were held at that time and furosemide was resumed.     Patient presented to the ED with her son in law and daughter on this am with complaints of AMS. Per the daughter, Patient was seen well at about 9:30 last night as she was going to bed. She was found about 7:00 am this morning not responding. Daughter says she found her mother in bed with eyes open. She was unable to answer any questions. Daughter states at the patients baseline she is ambulatory, can complete her ADLs, and is oriented. Code stroke activated in the ED. Vascular neuro recommended head CT- No acute intracranial pathology.  If concern persists for acute ischemic event, consider MRI brain for further evaluation if patient compatible. Sequela of chronic microvascular ischemic change. Stable mainly calcified extra-axial lesion along the left frontal convexity, presumed meningioma. CTA head and neck ordered but held per Neuro. Unable to  perform MRI brain 2/2 ICD. [facility] neuro has been consulted. Nephrology was consulted for peritoneal dialysis. Cardiology was also consulted as her troponin has been elevated this admission--no c/o CP. EKG with SR with PACs.    Her ED workup also includes a stable hemoglobin 11.1, CO2-21, anion gap-17, Cr-3.5, glucose 194, troponin 0.064>0.097, lactate 2.5, TSH 0.997. CXR- Interstitial and alveolar opacities have progressed since the 03/12/2024 radiograph and may relate to edema, noting that infectious or noninfectious inflammatory etiology may peers similar. Left greater than right pleural effusions. Increased conspicuity of slightly nodular opacity at the left lateral mid lung zone which could relate to loculated pleural fluid. No infection noted on U/A. Will admit to the [facility] service for further care.

## 2024-04-20 NOTE — SUBJECTIVE & OBJECTIVE
Past Medical History:   Diagnosis Date    Acute encephalopathy 02/29/2024    Acute hypoxemic respiratory failure 12/19/2019    Acute right-sided thoracic back pain 12/09/2019    Allergy     Asthma     Basal cell carcinoma     left forehead    Basal cell carcinoma     left nose    Basal cell carcinoma 05/20/2015    right nose    Basal cell carcinoma 12/22/2015    left lower post neck    Basal cell carcinoma 12/03/2019    left ant scalp     BCC (basal cell carcinoma of skin) 10/20/2022    Right alar groove    Bilateral pleural effusion     Bilateral renal cysts     Breast cancer     CAD (coronary artery disease)     Cardiomyopathy     Cardiomyopathy, ischemic     Cataract     Chest pain 03/04/2024    CHF (congestive heart failure)     CKD (chronic kidney disease) stage 4, GFR 15-29 ml/min     Colon polyp 2011    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, with long-term current use of insulin 02/22/2018    COPD (chronic obstructive pulmonary disease)     COPD exacerbation 04/08/2018    Current mild episode of major depressive disorder without prior episode 01/25/2022    Defibrillator discharge     Dependence on renal dialysis 08/22/2023    Diabetes mellitus     Diabetes mellitus type II     Diabetes with neurologic complications     Edema     ESRD needing dialysis     Goals of care, counseling/discussion 07/08/2022    Goiter     MNG    Hematuria, unspecified     HX: breast cancer     Hyperlipidemia     Hypertension     Hypocalcemia     Hypokalemia     Hyponatremia     Hyponatremia 04/02/2022    Iron deficiency anemia 05/16/2017    Iron deficiency anemia     Iron deficiency anemia     Left kidney mass     Meningioma     Microalbuminuria due to type 2 diabetes mellitus 01/26/2022    Osteoporosis, postmenopausal     Pneumonia 12/08/2019    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    Proteinuria 01/21/2019    Pseudomonas pneumonia     SCC  (squamous cell carcinoma) 08/25/2022    right posterior neck    Skin cancer     s/p excision    Sleep apnea     CPAP    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/2015    mid forehead    Unspecified vitamin D deficiency     UTI (urinary tract infection) 04/02/2022    Ventricular tachycardia     Vitamin B12 deficiency     Vitamin D deficiency disease        Past Surgical History:   Procedure Laterality Date    BASAL CELL CARCINOMA EXCISION      posterior neck and nose    BREAST BIOPSY      BREAST CYST EXCISION Left     BREAST SURGERY      CARDIAC DEFIBRILLATOR PLACEMENT      x 2    CATARACT EXTRACTION W/  INTRAOCULAR LENS IMPLANT Bilateral     CHOLECYSTECTOMY      COLONOSCOPY N/A 11/5/2019    Procedure: COLONOSCOPY;  Surgeon: Boaz Botello MD;  Location: 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    FRACTURE SURGERY      left elbow and wrist as a child    HYSTERECTOMY      INSERTION, CATHETER, DIALYSIS, PERITONEAL, LAPAROSCOPIC N/A 4/25/2023    Procedure: INSERTION, CATHETER, DIALYSIS, PERITONEAL, LAPAROSCOPIC;  Surgeon: Marcelo Isaac MD;  Location: [facility] OR;  Service: General;  Laterality: N/A;    LAPAROSCOPIC LYSIS OF ADHESIONS N/A 4/25/2023    Procedure: LYSIS, ADHESIONS, LAPAROSCOPIC;  Surgeon: Marcelo Isaac MD;  Location: [facility] OR;  Service: General;  Laterality: N/A;    MASTECTOMY Right     OMENTOPEXY, LAPAROSCOPIC N/A 4/25/2023    Procedure: OMENTOPEXY, LAPAROSCOPIC;  Surgeon: Marcelo Isaac MD;  Location: [facility] OR;  Service: General;  Laterality: N/A;    REPLACEMENT OF IMPLANTABLE CARDIOVERTER-DEFIBRILLATOR (ICD) GENERATOR N/A 12/17/2018    Procedure: REPLACEMENT, ICD GENERATOR;  Surgeon: Jan Mckeon MD;  Location: [facility] EP LAB;  Service: Cardiology;  Laterality: N/A;  DONNA, CRT-D gen sulema, MDT, MAC, SK, 3 Prep    REVISION OF SKIN POCKET FOR CARDIOVERTER-DEFIBRILLATOR  12/17/2018    Procedure: Revision, Skin Pocket,  For Cardioverter-Defibrillator;  Surgeon: Jan Mckeon MD;  Location: [facility] EP LAB;  Service: Cardiology;;    SQUAMOUS CELL CARCINOMA EXCISION      remved from forehead    TONSILLECTOMY         Review of patient's allergies indicates:   Allergen Reactions    Iodine and iodide containing products Hives and Other (See Comments)     Not specified     Nifedipine      weakness       Current Facility-Administered Medications   Medication Dose Route Frequency Provider Last Rate Last Admin    albuterol-ipratropium 2.5 mg-0.5 mg/3 mL nebulizer solution 3 mL  3 mL Nebulization Q4H PRN Brayan Frye, NP        ascorbic acid (vitamin C) tablet 1,000 mg  1,000 mg Oral Daily Brayan Frye, NP        aspirin chewable tablet 81 mg  81 mg Oral QHS Brayan Frye, NP        bisacodyL suppository 10 mg  10 mg Rectal Daily PRN Brayan Frye, NP        calcium carbonate 200 mg calcium (500 mg) chewable tablet 1,000 mg  1,000 mg Oral BID Brayan Frye, NP        clopidogreL tablet 300 mg  300 mg Oral Once Brayan Frye, NP        [START ON 4/21/2024] clopidogreL tablet 75 mg  75 mg Oral Daily Brayan Frye, NP        cyanocobalamin tablet 1,000 mcg  1,000 mcg Oral Daily Brayan Frye, NP        dextrose 10% bolus 125 mL 125 mL  12.5 g Intravenous PRN Brayan Frye, NP        dextrose 10% bolus 250 mL 250 mL  25 g Intravenous PRN Brayan Frye, NP        furosemide tablet 80 mg  80 mg Oral BID Brayan Frye, NP        heparin (porcine) injection 5,000 Units  5,000 Units Subcutaneous Q8H Brayan Frye, NP        iohexoL (OMNIPAQUE 350) injection 100 mL  100 mL Intravenous ONCE PRN Salinas Tyson MD        labetaloL injection 10 mg  10 mg Intravenous Q6H PRN Brayan Frye, NP        LORazepam injection 2 mg  2 mg Intravenous Q4H PRN Uday,  Brayan ATWOOD, NP        montelukast tablet 10 mg  10 mg Oral Daily Brayan Frye, NP        ondansetron injection 4 mg  4 mg Intravenous Q6H PRN Brayan Frye, NP        pravastatin tablet 40 mg  40 mg Oral QHS Brayan Frye, NP        sodium bicarbonate tablet 650 mg  650 mg Oral TID Brayan Frye, NP        sodium chloride 0.9% bolus 500 mL 500 mL  500 mL Intravenous PRN Brayan Frye, NP        sodium chloride 0.9% flush 10 mL  10 mL Intravenous PRN Brayan Frye, NP        vitamin D 1000 units tablet 2,000 Units  2,000 Units Oral Daily Brayan Frye, NP         Family History       Problem Relation (Age of Onset)    Asthma Mother    Cancer Brother, Daughter, Son    Cardiomyopathy Father    Cerebral aneurysm Brother    Diabetes Father, Sister, Brother, Brother, Brother, Brother, Daughter, Son, Son    Heart attack Sister, Brother    Heart disease Sister, Brother, Brother, Brother    Hypertension Mother, Brother, Brother    Melanoma Daughter    No Known Problems Maternal Grandmother, Maternal Grandfather, Paternal Grandmother, Paternal Grandfather    Obesity Daughter    Stroke Mother          Tobacco Use    Smoking status: Never     Passive exposure: Never    Smokeless tobacco: Never   Substance and Sexual Activity    Alcohol use: No     Alcohol/week: 0.0 standard drinks of alcohol    Drug use: No    Sexual activity: Yes     Partners: Male     Review of Systems   Unable to perform ROS: Acuity of condition     Objective:     Vital Signs (Most Recent):  Temp: 97.8 °F (36.6 °C) (04/20/24 1236)  Pulse: 95 (04/20/24 1236)  Resp: 20 (04/20/24 1236)  BP: (!) 181/86 (04/20/24 1236)  SpO2: 97 % (04/20/24 1236) Vital Signs (24h Range):  Temp:  [97.8 °F (36.6 °C)-97.9 °F (36.6 °C)] 97.8 °F (36.6 °C)  Pulse:  [61-95] 95  Resp:  [16-35] 20  SpO2:  [90 %-99 %] 97 %  BP: (181-218)/(82-92) 181/86     Weight: 55.6 kg (122 lb 9.2  oz)  Body mass index is 21.71 kg/m².     Physical Exam  Vitals and nursing note reviewed.   Constitutional:       Appearance: She is ill-appearing.      Comments: Frail elderly female   HENT:      Head: Normocephalic and atraumatic.      Nose: Nose normal.      Mouth/Throat:      Mouth: Mucous membranes are moist.   Eyes:      Extraocular Movements: Extraocular movements intact.   Cardiovascular:      Rate and Rhythm: Normal rate and regular rhythm.      Pulses: Normal pulses.      Heart sounds: Normal heart sounds.   Abdominal:      General: There is no distension.      Tenderness: There is no abdominal tenderness. There is no guarding.      Comments: +PD catheter   Musculoskeletal:      Cervical back: Normal range of motion and neck supple.   Skin:     General: Skin is warm and dry.      Capillary Refill: Capillary refill takes 2 to 3 seconds.      Findings: Bruising and rash present.   Neurological:      Mental Status: She is disoriented.      Motor: Weakness present.                Significant Labs: All pertinent labs within the past 24 hours have been reviewed.    Significant Imaging: I have reviewed all pertinent imaging results/findings within the past 24 hours.

## 2024-04-20 NOTE — ED PROVIDER NOTES
Encounter Date: 4/20/2024       History     Chief Complaint   Patient presents with    Altered Mental Status     Patient coming from home via [facility]. Altered mental status this morning when she woke up. Last know well last night when she went to bed but time unknown. History of Peritoneal dialysis daily and unsure if patient received dialysis last night. Glucose 137. Patient awake but not answering questions at this time.      Patient is an 83-year-old female brought in by EMS from home for an altered mental status noted this morning.  Patient was seen well at about 9:30 last night as she was going to bed.  She was found about 7:00 am this morning not responding.  Daughter says she found her mother in bed with eyes open.  She was unable to answer any questions.       Review of patient's allergies indicates:   Allergen Reactions    Iodine and iodide containing products Hives and Other (See Comments)     Not specified     Nifedipine      weakness     Past Medical History:   Diagnosis Date    Acute encephalopathy 02/29/2024    Acute hypoxemic respiratory failure 12/19/2019    Acute right-sided thoracic back pain 12/09/2019    Allergy     Asthma     Basal cell carcinoma     left forehead    Basal cell carcinoma     left nose    Basal cell carcinoma 05/20/2015    right nose    Basal cell carcinoma 12/22/2015    left lower post neck    Basal cell carcinoma 12/03/2019    left ant scalp     BCC (basal cell carcinoma of skin) 10/20/2022    Right alar groove    Bilateral pleural effusion     Bilateral renal cysts     Breast cancer     CAD (coronary artery disease)     Cardiomyopathy     Cardiomyopathy, ischemic     Cataract     Chest pain 03/04/2024    CHF (congestive heart failure)     CKD (chronic kidney disease) stage 4, GFR 15-29 ml/min     Colon polyp 2011    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, with long-term current use of insulin  02/22/2018    COPD (chronic obstructive pulmonary disease)     COPD exacerbation 04/08/2018    Current mild episode of major depressive disorder without prior episode 01/25/2022    Defibrillator discharge     Dependence on renal dialysis 08/22/2023    Diabetes mellitus     Diabetes mellitus type II     Diabetes with neurologic complications     Edema     ESRD needing dialysis     Goals of care, counseling/discussion 07/08/2022    Goiter     MNG    Hematuria, unspecified     HX: breast cancer     Hyperlipidemia     Hypertension     Hypocalcemia     Hypokalemia     Hyponatremia     Hyponatremia 04/02/2022    Iron deficiency anemia 05/16/2017    Iron deficiency anemia     Iron deficiency anemia     Left kidney mass     Meningioma     Microalbuminuria due to type 2 diabetes mellitus 01/26/2022    Osteoporosis, postmenopausal     Pneumonia 12/08/2019    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    Proteinuria 01/21/2019    Pseudomonas pneumonia     SCC (squamous cell carcinoma) 08/25/2022    right posterior neck    Skin cancer     s/p excision    Sleep apnea     CPAP    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/2015    mid forehead    Unspecified vitamin D deficiency     UTI (urinary tract infection) 04/02/2022    Ventricular tachycardia     Vitamin B12 deficiency     Vitamin D deficiency disease      Past Surgical History:   Procedure Laterality Date    BASAL CELL CARCINOMA EXCISION      posterior neck and nose    BREAST BIOPSY      BREAST CYST EXCISION Left     BREAST SURGERY      CARDIAC DEFIBRILLATOR PLACEMENT      x 2    CATARACT EXTRACTION W/  INTRAOCULAR LENS IMPLANT Bilateral     CHOLECYSTECTOMY      COLONOSCOPY N/A 11/5/2019    Procedure: COLONOSCOPY;  Surgeon: Boaz Botello MD;  Location: [facility] ([address] [place] [address]);  Service: Endoscopy;  Laterality: N/A;  AIC - [employer] -     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    FRACTURE SURGERY      left elbow and wrist as a child    HYSTERECTOMY      INSERTION, CATHETER,  DIALYSIS, PERITONEAL, LAPAROSCOPIC N/A 4/25/2023    Procedure: INSERTION, CATHETER, DIALYSIS, PERITONEAL, LAPAROSCOPIC;  Surgeon: Marcelo Isaac MD;  Location: [facility] OR;  Service: General;  Laterality: N/A;    LAPAROSCOPIC LYSIS OF ADHESIONS N/A 4/25/2023    Procedure: LYSIS, ADHESIONS, LAPAROSCOPIC;  Surgeon: Marcelo Isaac MD;  Location: [facility] OR;  Service: General;  Laterality: N/A;    MASTECTOMY Right     OMENTOPEXY, LAPAROSCOPIC N/A 4/25/2023    Procedure: OMENTOPEXY, LAPAROSCOPIC;  Surgeon: Marcelo Isaac MD;  Location: [facility] OR;  Service: General;  Laterality: N/A;    REPLACEMENT OF IMPLANTABLE CARDIOVERTER-DEFIBRILLATOR (ICD) GENERATOR N/A 12/17/2018    Procedure: REPLACEMENT, ICD GENERATOR;  Surgeon: Jan Mckeon MD;  Location: [facility] EP LAB;  Service: Cardiology;  Laterality: N/A;  DONNA, CRT-D gen change, MDT, MAC, SK, 3 Prep    REVISION OF SKIN POCKET FOR CARDIOVERTER-DEFIBRILLATOR  12/17/2018    Procedure: Revision, Skin Pocket, For Cardioverter-Defibrillator;  Surgeon: Jan Mkceon MD;  Location: [facility] EP LAB;  Service: Cardiology;;    SQUAMOUS CELL CARCINOMA EXCISION      remved from forehead    TONSILLECTOMY       Family History   Problem Relation Name Age of Onset    Asthma Mother      Hypertension Mother      Stroke Mother      Diabetes Father      Cardiomyopathy Father      Diabetes Sister x1     Heart disease Sister x1     Heart attack Sister x1     Heart attack Brother Jarek     Diabetes Brother Jarek     Heart disease Brother Jarek     Hypertension Brother Jarek     Diabetes Brother Yee     Heart disease Brother Yee     Hypertension Brother Yee     Cerebral aneurysm Brother Yee     Diabetes Brother      Heart disease Brother      Cancer Brother          colon    Diabetes Brother      Diabetes Daughter x1         prediabetes    Cancer Daughter x1         melanoma    Obesity Daughter x1     Melanoma Daughter x1     Cancer Son          skin    Diabetes Son          prediabetes     Diabetes Son      No Known Problems Maternal Grandmother      No Known Problems Maternal Grandfather      No Known Problems Paternal Grandmother      No Known Problems Paternal Grandfather      Amblyopia Neg Hx      Blindness Neg Hx      Cataracts Neg Hx      Glaucoma Neg Hx      Macular degeneration Neg Hx      Retinal detachment Neg Hx      Strabismus Neg Hx      Thyroid disease Neg Hx       Social History     Tobacco Use    Smoking status: Never     Passive exposure: Never    Smokeless tobacco: Never   Substance Use Topics    Alcohol use: No     Alcohol/week: 0.0 standard drinks of alcohol    Drug use: No     Review of Systems   Unable to perform ROS: Patient nonverbal       Physical Exam     Initial Vitals [04/20/24 0820]   BP Pulse Resp Temp SpO2   (!) 197/85 62 16 97.9 °F (36.6 °C) 95 %      MAP       --         Physical Exam    Nursing note and vitals reviewed.  Constitutional: No distress.   HENT:   Head: Atraumatic.   Eyes: Conjunctivae and EOM are normal.   Cardiovascular:  Normal rate, regular rhythm and normal heart sounds.           Pulmonary/Chest: No respiratory distress.   Abdominal: Abdomen is soft.   Musculoskeletal:         General: No edema.     Skin: Skin is warm and dry.   Psychiatric: Her behavior is normal.         ED Course   Procedures  Labs Reviewed   CBC W/ AUTO DIFFERENTIAL - Abnormal; Notable for the following components:       Result Value    RBC 3.66 (*)     Hemoglobin 11.1 (*)     Hematocrit 34.8 (*)     MCHC 31.9 (*)     Immature Granulocytes 0.7 (*)     Gran # (ANC) 8.1 (*)     Immature Grans (Abs) 0.07 (*)     Gran % 79.3 (*)     Lymph % 10.6 (*)     All other components within normal limits   COMPREHENSIVE METABOLIC PANEL - Abnormal; Notable for the following components:    CO2 21 (*)     Glucose 194 (*)     BUN 67 (*)     Creatinine 3.5 (*)     Calcium 8.2 (*)     Albumin 2.4 (*)     Alkaline Phosphatase 166 (*)     eGFR 12 (*)     Anion Gap 17 (*)     All other components  within normal limits   LIPID PANEL - Abnormal; Notable for the following components:    Cholesterol 100 (*)     LDL Cholesterol 28.4 (*)     HDL/Cholesterol Ratio 54.0 (*)     Total Cholesterol/HDL Ratio 1.9 (*)     All other components within normal limits   TROPONIN I - Abnormal; Notable for the following components:    Troponin I 0.064 (*)     All other components within normal limits   URINALYSIS, REFLEX TO URINE CULTURE - Abnormal; Notable for the following components:    Protein, UA 3+ (*)     Glucose, UA Trace (*)     All other components within normal limits    Narrative:     Specimen Source->Urine   ISTAT CREATININE - Abnormal; Notable for the following components:    POC Creatinine 3.6 (*)     All other components within normal limits   POCT GLUCOSE - Abnormal; Notable for the following components:    POCT Glucose 191 (*)     All other components within normal limits   GRAM STAIN   CULTURE, AEROBIC  (SPECIFY SOURCE)   PROTIME-INR   TSH   MAGNESIUM   PHOSPHORUS   URINALYSIS MICROSCOPIC    Narrative:     Specimen Source->Urine   WBC & DIFF, BODY FLUID   POCT GLUCOSE, HAND-HELD DEVICE   POCT GLUCOSE, HAND-HELD DEVICE   ISTAT PROCEDURE          Imaging Results              X-Ray Chest AP Portable (Final result)  Result time 04/20/24 09:26:29      Final result by Ronal Henry MD (04/20/24 09:26:29)                   Impression:      Interstitial and alveolar opacities have progressed since the 03/12/2024 radiograph and may relate to edema, noting that infectious or noninfectious inflammatory etiology may peers similar.    Left greater than right pleural effusions.    Increased conspicuity of slightly nodular opacity at the left lateral mid lung zone which could relate to loculated pleural fluid given appearance on prior chest CT versus infectious or noninfectious inflammatory airspace consolidation.  Attention on follow-up to exclude the possibility of neoplasm recommended.      Electronically signed  by: Ronal Henry  Date:    04/20/2024  Time:    09:26               Narrative:    EXAMINATION:  XR CHEST AP PORTABLE    CLINICAL HISTORY:  Stroke;    TECHNIQUE:  Single frontal view of the chest was performed.    COMPARISON:  Chest radiograph performed 03/12/2024.    FINDINGS:  Monitoring leads overlie the chest.  Left subclavian approach AICD.    Grossly unchanged cardiomediastinal contours, again noting enlargement the cardiac silhouette and prominence of central pulmonary vasculature.    Interstitial alveolar opacities have progressed since 03/12/2024 examination.  Persistent dense retrocardiac and left mid lower lung zone opacity.  More focal nodular opacity measuring on the order of 30 mm is noted in the left lateral hemithorax.  Small right and at least moderate left pleural effusions are noted.    No definite pneumothorax.    No acute findings in the visualized abdomen.  Vascular calcifications postoperative sequela project in the upper abdomen.    Osseous and soft tissue structures appear without definite acute change.                                       CT Head Without Contrast (Final result)  Result time 04/20/24 08:52:46      Final result by Ran Jain MD (04/20/24 08:52:46)                   Impression:      No acute intracranial pathology.  If concern persists for acute ischemic event, consider MRI brain for further evaluation if patient compatible.    Sequela of chronic microvascular ischemic change.    Stable mainly calcified extra-axial lesion along the left frontal convexity, presumed meningioma.      Electronically signed by: Ran Jain  Date:    04/20/2024  Time:    08:52               Narrative:    EXAMINATION:  CT HEAD WITHOUT CONTRAST    CLINICAL HISTORY:  Neuro deficit, acute, stroke suspected;    TECHNIQUE:  Low dose axial CT images obtained throughout the head without intravenous contrast. Sagittal and coronal reconstructions were performed.    COMPARISON:  CTA head and neck  02/29/2024, CT head without contrast 02/29/2024.    FINDINGS:  Intracranial compartment:    Prominence of the ventricles with compensatory enlargement of the sulci, in keeping with central volume loss.  No extra-axial blood or fluid collections.    Diffuse scattered patchy and confluent regions of subcortical and periventricular T2/FLAIR hyperintense signal, overall nonspecific, but can be seen in the setting of microvascular ischemic change.  No parenchymal mass, hemorrhage, edema or major vascular distribution infarct.    Mainly calcified extra-axial lesion along the left frontal convexity measuring approximately 2.4 x 1.3 cm, presumed meningioma.    Skull/extracranial contents (limited evaluation): No fracture. Scattered patchy mucosal thickening about the paranasal sinuses with near complete opacification of the left maxillary and sphenoid sinus, noting surrounding osseous thickening and sclerosis, likely denoting component of chronic sinusitis.    Vascular calcification about the skull base.  Hyperdense appearance of the basilar artery may relate to diffuse streak artifact or surrounding calcific atherosclerosis.                                       Medications   iohexoL (OMNIPAQUE 350) injection 100 mL (has no administration in time range)   dextrose 10% bolus 125 mL 125 mL (has no administration in time range)   dextrose 10% bolus 250 mL 250 mL (has no administration in time range)   heparin (porcine) injection 5,000 Units (has no administration in time range)   methylPREDNISolone sodium succinate injection 125 mg (125 mg Intravenous Given 4/20/24 0928)   diphenhydrAMINE injection 25 mg (25 mg Intravenous Given 4/20/24 0928)   hydrALAZINE injection 10 mg (10 mg Intravenous Given 4/20/24 1056)     Medical Decision Making  DDx :  Including but not limited to :  TIA, CVA, dehydration, electrolyte abnormality, uremia, volume overload.    Emergent evaluation of an 83-year-old wound for home with an altered mental  status.  Urinalysis shows no signs of infection and head CT is unremarkable.  Family now present in the room and say that the patient is not back to baseline.  She will be admitted by the [facility] hospitalist for further evaluation and treatment.        Amount and/or Complexity of Data Reviewed  Labs: ordered.     Details: CBC with a hemoglobin of 11.1 and hematocrit of 34.8.  CMP with a glucose of 1 and 4, BUN of 67 and creatinine of 3.5.  Troponin is 0.064.  Radiology: ordered.     Details: Chest x-ray with increased interstitial opacities.  Discussion of management or test interpretation with external provider(s): I have discussed the patient's symptoms as well as pertinent lab values and imaging the [facility] hospitalist.    Risk  Prescription drug management.               ED Course as of 04/20/24 1126   Sat Apr 20, 2024   1025 Discussed patient with Dr. Borrero, (patient's nephrologist).  Who would prefer not to the patient contrasted for head scan due to her poor renal function with a GFR of 12. [RL]      ED Course User Index  [RL] Salinas Tyson MD                           Clinical Impression:  Final diagnoses:  [R29.818] Acute focal neurological deficit  [R53.1] Weakness  [R07.9] Chest pain  [I63.9] Stroke          ED Disposition Condition    Observation                 Salinas Tyson MD  04/20/24 1121

## 2024-04-21 PROBLEM — Z51.5 COMFORT MEASURES ONLY STATUS: Status: ACTIVE | Noted: 2024-04-21

## 2024-04-21 LAB
ALBUMIN SERPL BCP-MCNC: 2.5 G/DL (ref 3.5–5.2)
ALBUMIN SERPL BCP-MCNC: 2.5 G/DL (ref 3.5–5.2)
ALP SERPL-CCNC: 151 U/L (ref 55–135)
ALT SERPL W/O P-5'-P-CCNC: 14 U/L (ref 10–44)
ANION GAP SERPL CALC-SCNC: 18 MMOL/L (ref 8–16)
ANION GAP SERPL CALC-SCNC: 18 MMOL/L (ref 8–16)
AST SERPL-CCNC: 20 U/L (ref 10–40)
BASOPHILS # BLD AUTO: 0.04 K/UL (ref 0–0.2)
BASOPHILS # BLD AUTO: 0.04 K/UL (ref 0–0.2)
BASOPHILS NFR BLD: 0.3 % (ref 0–1.9)
BASOPHILS NFR BLD: 0.3 % (ref 0–1.9)
BILIRUB SERPL-MCNC: 0.3 MG/DL (ref 0.1–1)
BUN SERPL-MCNC: 84 MG/DL (ref 8–23)
BUN SERPL-MCNC: 84 MG/DL (ref 8–23)
CALCIUM SERPL-MCNC: 7.8 MG/DL (ref 8.7–10.5)
CALCIUM SERPL-MCNC: 7.8 MG/DL (ref 8.7–10.5)
CHLORIDE SERPL-SCNC: 102 MMOL/L (ref 95–110)
CHLORIDE SERPL-SCNC: 102 MMOL/L (ref 95–110)
CO2 SERPL-SCNC: 19 MMOL/L (ref 23–29)
CO2 SERPL-SCNC: 19 MMOL/L (ref 23–29)
CREAT SERPL-MCNC: 4.4 MG/DL (ref 0.5–1.4)
CREAT SERPL-MCNC: 4.4 MG/DL (ref 0.5–1.4)
DIFFERENTIAL METHOD BLD: ABNORMAL
DIFFERENTIAL METHOD BLD: ABNORMAL
EOSINOPHIL # BLD AUTO: 0 K/UL (ref 0–0.5)
EOSINOPHIL # BLD AUTO: 0 K/UL (ref 0–0.5)
EOSINOPHIL NFR BLD: 0 % (ref 0–8)
EOSINOPHIL NFR BLD: 0 % (ref 0–8)
ERYTHROCYTE [DISTWIDTH] IN BLOOD BY AUTOMATED COUNT: 14.6 % (ref 11.5–14.5)
ERYTHROCYTE [DISTWIDTH] IN BLOOD BY AUTOMATED COUNT: 14.6 % (ref 11.5–14.5)
EST. GFR  (NO RACE VARIABLE): 9 ML/MIN/1.73 M^2
EST. GFR  (NO RACE VARIABLE): 9 ML/MIN/1.73 M^2
GLUCOSE SERPL-MCNC: 249 MG/DL (ref 70–110)
GLUCOSE SERPL-MCNC: 249 MG/DL (ref 70–110)
HCT VFR BLD AUTO: 36.9 % (ref 37–48.5)
HCT VFR BLD AUTO: 36.9 % (ref 37–48.5)
HGB BLD-MCNC: 11.6 G/DL (ref 12–16)
HGB BLD-MCNC: 11.6 G/DL (ref 12–16)
IMM GRANULOCYTES # BLD AUTO: 0.08 K/UL (ref 0–0.04)
IMM GRANULOCYTES # BLD AUTO: 0.08 K/UL (ref 0–0.04)
IMM GRANULOCYTES NFR BLD AUTO: 0.7 % (ref 0–0.5)
IMM GRANULOCYTES NFR BLD AUTO: 0.7 % (ref 0–0.5)
LYMPHOCYTES # BLD AUTO: 1.5 K/UL (ref 1–4.8)
LYMPHOCYTES # BLD AUTO: 1.5 K/UL (ref 1–4.8)
LYMPHOCYTES NFR BLD: 12.4 % (ref 18–48)
LYMPHOCYTES NFR BLD: 12.4 % (ref 18–48)
MAGNESIUM SERPL-MCNC: 1.9 MG/DL (ref 1.6–2.6)
MCH RBC QN AUTO: 30.5 PG (ref 27–31)
MCH RBC QN AUTO: 30.5 PG (ref 27–31)
MCHC RBC AUTO-ENTMCNC: 31.4 G/DL (ref 32–36)
MCHC RBC AUTO-ENTMCNC: 31.4 G/DL (ref 32–36)
MCV RBC AUTO: 97 FL (ref 82–98)
MCV RBC AUTO: 97 FL (ref 82–98)
MONOCYTES # BLD AUTO: 1 K/UL (ref 0.3–1)
MONOCYTES # BLD AUTO: 1 K/UL (ref 0.3–1)
MONOCYTES NFR BLD: 8.7 % (ref 4–15)
MONOCYTES NFR BLD: 8.7 % (ref 4–15)
NEUTROPHILS # BLD AUTO: 9.3 K/UL (ref 1.8–7.7)
NEUTROPHILS # BLD AUTO: 9.3 K/UL (ref 1.8–7.7)
NEUTROPHILS NFR BLD: 77.9 % (ref 38–73)
NEUTROPHILS NFR BLD: 77.9 % (ref 38–73)
NRBC BLD-RTO: 0 /100 WBC
NRBC BLD-RTO: 0 /100 WBC
PHOSPHATE SERPL-MCNC: 5.8 MG/DL (ref 2.7–4.5)
PHOSPHATE SERPL-MCNC: 5.8 MG/DL (ref 2.7–4.5)
PLATELET # BLD AUTO: 276 K/UL (ref 150–450)
PLATELET # BLD AUTO: 276 K/UL (ref 150–450)
PMV BLD AUTO: 10.7 FL (ref 9.2–12.9)
PMV BLD AUTO: 10.7 FL (ref 9.2–12.9)
POCT GLUCOSE: 173 MG/DL (ref 70–110)
POCT GLUCOSE: 202 MG/DL (ref 70–110)
POCT GLUCOSE: 243 MG/DL (ref 70–110)
POCT GLUCOSE: 293 MG/DL (ref 70–110)
POTASSIUM SERPL-SCNC: 4 MMOL/L (ref 3.5–5.1)
POTASSIUM SERPL-SCNC: 4 MMOL/L (ref 3.5–5.1)
PROT SERPL-MCNC: 6.6 G/DL (ref 6–8.4)
RBC # BLD AUTO: 3.8 M/UL (ref 4–5.4)
RBC # BLD AUTO: 3.8 M/UL (ref 4–5.4)
SODIUM SERPL-SCNC: 139 MMOL/L (ref 136–145)
SODIUM SERPL-SCNC: 139 MMOL/L (ref 136–145)
VANCOMYCIN SERPL-MCNC: 14.3 UG/ML
WBC # BLD AUTO: 11.95 K/UL (ref 3.9–12.7)
WBC # BLD AUTO: 11.95 K/UL (ref 3.9–12.7)

## 2024-04-21 PROCEDURE — 63600175 PHARM REV CODE 636 W HCPCS: Performed by: NURSE PRACTITIONER

## 2024-04-21 PROCEDURE — 97535 SELF CARE MNGMENT TRAINING: CPT

## 2024-04-21 PROCEDURE — 27000221 HC OXYGEN, UP TO 24 HOURS

## 2024-04-21 PROCEDURE — 80202 ASSAY OF VANCOMYCIN: CPT | Performed by: INTERNAL MEDICINE

## 2024-04-21 PROCEDURE — 96372 THER/PROPH/DIAG INJ SC/IM: CPT | Performed by: FAMILY MEDICINE

## 2024-04-21 PROCEDURE — 80069 RENAL FUNCTION PANEL: CPT | Performed by: INTERNAL MEDICINE

## 2024-04-21 PROCEDURE — 36415 COLL VENOUS BLD VENIPUNCTURE: CPT | Performed by: INTERNAL MEDICINE

## 2024-04-21 PROCEDURE — 96366 THER/PROPH/DIAG IV INF ADDON: CPT

## 2024-04-21 PROCEDURE — 80053 COMPREHEN METABOLIC PANEL: CPT | Performed by: NURSE PRACTITIONER

## 2024-04-21 PROCEDURE — 94761 N-INVAS EAR/PLS OXIMETRY MLT: CPT

## 2024-04-21 PROCEDURE — 96375 TX/PRO/DX INJ NEW DRUG ADDON: CPT

## 2024-04-21 PROCEDURE — 96372 THER/PROPH/DIAG INJ SC/IM: CPT | Performed by: NURSE PRACTITIONER

## 2024-04-21 PROCEDURE — A4216 STERILE WATER/SALINE, 10 ML: HCPCS | Performed by: NURSE PRACTITIONER

## 2024-04-21 PROCEDURE — 96365 THER/PROPH/DIAG IV INF INIT: CPT

## 2024-04-21 PROCEDURE — 11000001 HC ACUTE MED/SURG PRIVATE ROOM

## 2024-04-21 PROCEDURE — 63600175 PHARM REV CODE 636 W HCPCS: Performed by: STUDENT IN AN ORGANIZED HEALTH CARE EDUCATION/TRAINING PROGRAM

## 2024-04-21 PROCEDURE — 25000003 PHARM REV CODE 250: Performed by: NURSE PRACTITIONER

## 2024-04-21 PROCEDURE — 99900035 HC TECH TIME PER 15 MIN (STAT)

## 2024-04-21 PROCEDURE — 92610 EVALUATE SWALLOWING FUNCTION: CPT

## 2024-04-21 PROCEDURE — 63600175 PHARM REV CODE 636 W HCPCS: Performed by: FAMILY MEDICINE

## 2024-04-21 PROCEDURE — 83735 ASSAY OF MAGNESIUM: CPT | Performed by: NURSE PRACTITIONER

## 2024-04-21 PROCEDURE — 85025 COMPLETE CBC W/AUTO DIFF WBC: CPT | Performed by: NURSE PRACTITIONER

## 2024-04-21 RX ORDER — HYDROMORPHONE HYDROCHLORIDE 1 MG/ML
0.2 INJECTION, SOLUTION INTRAMUSCULAR; INTRAVENOUS; SUBCUTANEOUS ONCE
Status: COMPLETED | OUTPATIENT
Start: 2024-04-21 | End: 2024-04-21

## 2024-04-21 RX ORDER — LORAZEPAM 2 MG/ML
1 INJECTION INTRAMUSCULAR EVERY 4 HOURS PRN
Status: DISCONTINUED | OUTPATIENT
Start: 2024-04-21 | End: 2024-04-21

## 2024-04-21 RX ORDER — SEVELAMER CARBONATE 800 MG/1
800 TABLET, FILM COATED ORAL
Status: DISCONTINUED | OUTPATIENT
Start: 2024-04-21 | End: 2024-04-22

## 2024-04-21 RX ORDER — SODIUM BICARBONATE 650 MG/1
1300 TABLET ORAL 4 TIMES DAILY
Status: DISCONTINUED | OUTPATIENT
Start: 2024-04-21 | End: 2024-04-24 | Stop reason: HOSPADM

## 2024-04-21 RX ORDER — OLANZAPINE 5 MG/1
5 TABLET, ORALLY DISINTEGRATING ORAL 2 TIMES DAILY PRN
Status: DISCONTINUED | OUTPATIENT
Start: 2024-04-21 | End: 2024-04-24 | Stop reason: HOSPADM

## 2024-04-21 RX ADMIN — FUROSEMIDE 80 MG: 10 INJECTION, SOLUTION INTRAVENOUS at 10:04

## 2024-04-21 RX ADMIN — HEPARIN SODIUM 5000 UNITS: 5000 INJECTION INTRAVENOUS; SUBCUTANEOUS at 05:04

## 2024-04-21 RX ADMIN — Medication 10 ML: at 10:04

## 2024-04-21 RX ADMIN — PIPERACILLIN AND TAZOBACTAM 4.5 G: 4; .5 INJECTION, POWDER, LYOPHILIZED, FOR SOLUTION INTRAVENOUS; PARENTERAL at 04:04

## 2024-04-21 RX ADMIN — INSULIN ASPART 2 UNITS: 100 INJECTION, SOLUTION INTRAVENOUS; SUBCUTANEOUS at 01:04

## 2024-04-21 RX ADMIN — LORAZEPAM 1 MG: 2 INJECTION INTRAMUSCULAR; INTRAVENOUS at 05:04

## 2024-04-21 RX ADMIN — HYDROMORPHONE HYDROCHLORIDE 0.2 MG: 1 INJECTION, SOLUTION INTRAMUSCULAR; INTRAVENOUS; SUBCUTANEOUS at 10:04

## 2024-04-21 RX ADMIN — INSULIN ASPART 3 UNITS: 100 INJECTION, SOLUTION INTRAVENOUS; SUBCUTANEOUS at 05:04

## 2024-04-21 RX ADMIN — HEPARIN SODIUM 5000 UNITS: 5000 INJECTION INTRAVENOUS; SUBCUTANEOUS at 03:04

## 2024-04-21 NOTE — ASSESSMENT & PLAN NOTE
Patient's FSGs are uncontrolled due to hyperglycemia on current medication regimen.  Last A1c reviewed-   Lab Results   Component Value Date    HGBA1C 6.0 (H) 04/20/2024     Most recent fingerstick glucose reviewed-   Recent Labs   Lab 04/20/24  1103 04/20/24  1621 04/20/24  2123 04/21/24  0510   POCTGLUCOSE 191* 249* 277* 293*         Maintain anti-hyperglycemic dose as follows-   Antihyperglycemics (From admission, onward)    Start     Stop Route Frequency Ordered    04/21/24 0011  insulin aspart U-100 pen 0-5 Units         -- SubQ Every 6 hours PRN 04/20/24 2311        Hold Oral hypoglycemics while patient is in the hospital.  Holding insulins as she is NPO.

## 2024-04-21 NOTE — PROGRESS NOTES
Nephrology Consult  H&P      Consult Requested By: Geri Beltrán MD  Reason for Consult: ESRD    SUBJECTIVE:       Review of Systems   Constitutional:  Negative for chills and fever.   Respiratory:  Negative for cough and shortness of breath.    Cardiovascular:  Negative for chest pain and leg swelling.   Gastrointestinal:  Negative for nausea.      OBJECTIVE:     Vital Signs (Most Recent)  Vitals:    04/21/24 1007 04/21/24 1100 04/21/24 1208 04/21/24 1318   BP:    (!) 133/56   BP Location:    Left leg   Patient Position:    Lying   Pulse:   68 68   Resp: 18   18   Temp:    98.2 °F (36.8 °C)   TempSrc:    Oral   SpO2:  97%  96%   Weight:       Height:           Medications:  Current Facility-Administered Medications   Medication Dose Route Frequency    aspirin  81 mg Oral QHS    clopidogreL  300 mg Oral Once    clopidogreL  75 mg Oral Daily    furosemide (LASIX) injection  80 mg Intravenous BID    heparin (porcine)  5,000 Units Subcutaneous Q8H    piperacillin-tazobactam (Zosyn) IV (PEDS and ADULTS) (extended infusion is not appropriate)  4.5 g Intravenous Q12H    scopolamine  1 patch Transdermal Q3 Days    sodium bicarbonate  650 mg Oral TID     Physical Exam  Constitutional:       General: She is not in acute distress.     Appearance: She is not diaphoretic.   HENT:      Head: Normocephalic and atraumatic.   Eyes:      General: No scleral icterus.  Neck:      Vascular: No JVD.   Cardiovascular:      Rate and Rhythm: Normal rate and regular rhythm.      Heart sounds: No murmur heard.     No friction rub.   Pulmonary:      Effort: Pulmonary effort is normal. No respiratory distress.      Breath sounds: Normal breath sounds. No wheezing or rales.   Abdominal:      General: Bowel sounds are normal. There is no distension.      Palpations: Abdomen is soft.      Tenderness: There is no abdominal tenderness.   Musculoskeletal:      Cervical back: Neck supple.   Skin:     General: Skin is warm and dry.       Findings: Rash (This is developed over last 3 weeks.  Patient had seen Dr. [facility] earlier this week and was prescribed some kind of cream.  He would told patient's family that this is typical rash of dialysis.I Do not necessarily agree with that) present. No erythema.   Neurological:      Mental Status: She is alert and oriented to person, place, and time.   Psychiatric:         Mood and Affect: Affect normal.       Diagnostic Results:  X-Ray: Reviewed  US: Reviewed  Echo: Reviewed  ASSESSMENT/PLAN:   1. ESRD (N18.6 Z99.2) - Patient is well known to me [facility]  At home nightly 1 extranodal exchange of 2 L.  Currently in the hospital put on CCPD      Recent Labs   Lab 04/20/24  0839 04/21/24  0655    139  139   K 4.3 4.0  4.0   BUN 67* 84*  84*   CREATININE 3.5* 4.4*  4.4*     2. HTN (I10) - labile, not on blood pressure medications as afraid now.  Continue to monitor     Temp:  [98 °F (36.7 °C)-98.4 °F (36.9 °C)]   Pulse:  [63-71]   Resp:  [8-18]   BP: (126-174)/(52-74)   SpO2:  [96 %-100 %]       3. Anemia of chronic kidney disease treated with MIGUEL (N18.9 D63.1) - patient has not received any Epogen  in the last 3 months   Recent Labs   Lab 04/20/24  0839 04/21/24  0655   HGB 11.1* 11.6*  11.6*   HCT 34.8* 36.9*  36.9*    276  276     Lab Results   Component Value Date    IRON 124 05/09/2023    TIBC 355 05/09/2023    FERRITIN 639 (H) 05/19/2023     4. MBD (E88.9 M90.80) - excellent control on sevelamer 800 with each meal, continue ergocalciferol  Recent Labs   Lab 04/21/24  0655   CALCIUM 7.8*  7.8*   PHOS 5.8*  5.8*     Recent Labs   Lab 04/20/24  0839 04/21/24  0655   MG 1.8 1.9     Lab Results   Component Value Date    [id number] 23 (L) 03/01/2024     5. Acidosis - mild, this is new, her bicarbonate on Wednesday was 26,   Resume her sodium bicarbonate 650 mg t.i.d.  Recent Labs   Lab 04/20/24  0839 04/21/24  0655   CL  99 102  102   CO2 21* 19*  19*     6. Peritoneal dialysis Access (Z99.2 V45.11)- continue routine PD cath exit site care   7. Nutrition/Hypoalbuminemia (E88.09) -   Recent Labs   Lab 04/20/24  0839 04/21/24  0655   LABPROT 11.6  --    ALBUMIN 2.4* 2.5*  2.5*     Nepro with meals TID. Renal vitamins daily        Thank you for allowing me to participate in care of your patient  With any question please call   Mansi Borrero    [facility]  YONG Austin MD, FACP,   ESTRELLA Morocho MD,   MD TAMIKA Isabel MD E. V. Harmon, NP  [address]  [place], [address]  [phone]  After hours answering service: [phone]

## 2024-04-21 NOTE — PROGRESS NOTES
Pulm/CC Fellow Progress Note    Attending Physician: Geri Beltrán MD    Date of Admit: 4/20/2024  Hospital day: 0    Reason for Consult: Pleural Effusion    Interval History:  Patient with significant improvement in mental status. Now able to interact intermittently and answer some yes and no questions appropriately. Discussed plan of care with family at the bedside.       History of Present Illness:  Myesha Quinones is a 83 y.o.  female with a PMHx of ESRD on PD, T2DM, COPD, and chronic pleural effusion who presented to [facility] on 4/20/2024 with altered mental status. Patient unable to participate in interview so history provided by family at the bedside and chart review. The patient was recently discharged on 3/14/2024 with a pseudomonas pneumonia and uncomplicated parapneumonic effusion. On discharge patient was doing well and followed up with pulmonology as an outpatient. Pleural effusion was reduced following a thoracentesis at previous hospitalization and remained reduced. Patient was doing better at that time. She then presented today when her family noted she was altered. They stated yesterday she was in her normal state of health, able to handle her ADLs the night prior to presentation. At 7 am she was minimally responsive to her daughter. She was brought to the ED and initial CT head was negative. CTA was held due to concern for contrast in setting of renal disease. Pulmonology was consulted for pleural effusion.     I assessed the patient with the proactive rounding ICU RN due to worsening mental status and concern for decompensation. Patient was not interactive, agitated in bed.    Past Medical History:  Past Medical History:   Diagnosis Date    Acute encephalopathy 02/29/2024    Acute hypoxemic respiratory failure 12/19/2019    Acute right-sided thoracic back pain 12/09/2019    Allergy     Asthma     Basal cell carcinoma     left forehead    Basal cell carcinoma     left nose    Basal  cell carcinoma 05/20/2015    right nose    Basal cell carcinoma 12/22/2015    left lower post neck    Basal cell carcinoma 12/03/2019    left ant scalp     BCC (basal cell carcinoma of skin) 10/20/2022    Right alar groove    Bilateral pleural effusion     Bilateral renal cysts     Breast cancer     CAD (coronary artery disease)     Cardiomyopathy     Cardiomyopathy, ischemic     Cataract     Chest pain 03/04/2024    CHF (congestive heart failure)     CKD (chronic kidney disease) stage 4, GFR 15-29 ml/min     Colon polyp 2011    Controlled type 2 diabetes mellitus with both eyes affected by mild nonproliferative retinopathy and macular edema, with long-term current use of insulin 02/22/2018    COPD (chronic obstructive pulmonary disease)     COPD exacerbation 04/08/2018    Current mild episode of major depressive disorder without prior episode 01/25/2022    Defibrillator discharge     Dependence on renal dialysis 08/22/2023    Diabetes mellitus     Diabetes mellitus type II     Diabetes with neurologic complications     Edema     ESRD needing dialysis     Goals of care, counseling/discussion 07/08/2022    Goiter     MNG    Hematuria, unspecified     HX: breast cancer     Hyperlipidemia     Hypertension     Hypocalcemia     Hypokalemia     Hyponatremia     Hyponatremia 04/02/2022    Iron deficiency anemia 05/16/2017    Iron deficiency anemia     Iron deficiency anemia     Left kidney mass     Meningioma     Microalbuminuria due to type 2 diabetes mellitus 01/26/2022    Osteoporosis, postmenopausal     Pneumonia 12/08/2019    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    Proteinuria 01/21/2019    Pseudomonas pneumonia     SCC (squamous cell carcinoma) 08/25/2022    right posterior neck    Skin cancer     s/p excision    Sleep apnea     CPAP    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/2015    mid forehead    Unspecified vitamin D deficiency     UTI (urinary tract infection) 04/02/2022    Ventricular tachycardia     Vitamin  B12 deficiency     Vitamin D deficiency disease        Past Surgical History:  Past Surgical History:   Procedure Laterality Date    BASAL CELL CARCINOMA EXCISION      posterior neck and nose    BREAST BIOPSY      BREAST CYST EXCISION Left     BREAST SURGERY      CARDIAC DEFIBRILLATOR PLACEMENT      x 2    CATARACT EXTRACTION W/  INTRAOCULAR LENS IMPLANT Bilateral     CHOLECYSTECTOMY      COLONOSCOPY N/A 11/5/2019    Procedure: COLONOSCOPY;  Surgeon: Boaz Botello MD;  Location: [facility] ENDO (4TH FLR);  Service: Endoscopy;  Laterality: N/A;  AICD - [employer] -     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    FRACTURE SURGERY      left elbow and wrist as a child    HYSTERECTOMY      INSERTION, CATHETER, DIALYSIS, PERITONEAL, LAPAROSCOPIC N/A 4/25/2023    Procedure: INSERTION, CATHETER, DIALYSIS, PERITONEAL, LAPAROSCOPIC;  Surgeon: Marcelo Isaac MD;  Location: [facility] OR;  Service: General;  Laterality: N/A;    LAPAROSCOPIC LYSIS OF ADHESIONS N/A 4/25/2023    Procedure: LYSIS, ADHESIONS, LAPAROSCOPIC;  Surgeon: Marcelo Isaac MD;  Location: [facility] OR;  Service: General;  Laterality: N/A;    MASTECTOMY Right     OMENTOPEXY, LAPAROSCOPIC N/A 4/25/2023    Procedure: OMENTOPEXY, LAPAROSCOPIC;  Surgeon: Marcelo Isaac MD;  Location: [facility] OR;  Service: General;  Laterality: N/A;    REPLACEMENT OF IMPLANTABLE CARDIOVERTER-DEFIBRILLATOR (ICD) GENERATOR N/A 12/17/2018    Procedure: REPLACEMENT, ICD GENERATOR;  Surgeon: Jan Mckeon MD;  Location: [facility] EP LAB;  Service: Cardiology;  Laterality: N/A;  DONNA, CRT-D gen sulema, MDT, MAC, SK, 3 Prep    REVISION OF SKIN POCKET FOR CARDIOVERTER-DEFIBRILLATOR  12/17/2018    Procedure: Revision, Skin Pocket, For Cardioverter-Defibrillator;  Surgeon: Jan Mckeon MD;  Location: [facility] EP LAB;  Service: Cardiology;;    SQUAMOUS CELL CARCINOMA EXCISION      remved from forehead    TONSILLECTOMY         Allergies:  Review of patient's allergies indicates:   Allergen Reactions     Iodine and iodide containing products Hives and Other (See Comments)     Not specified     Nifedipine      weakness       Family History:  Family History   Problem Relation Name Age of Onset    Asthma Mother      Hypertension Mother      Stroke Mother      Diabetes Father      Cardiomyopathy Father      Diabetes Sister x1     Heart disease Sister x1     Heart attack Sister x1     Heart attack Brother Jarek     Diabetes Brother Jarek     Heart disease Brother Jarek     Hypertension Brother Jarek     Diabetes Brother Yee     Heart disease Brother Yee     Hypertension Brother Yee     Cerebral aneurysm Brother Yee     Diabetes Brother      Heart disease Brother      Cancer Brother          colon    Diabetes Brother      Diabetes Daughter x1         prediabetes    Cancer Daughter x1         melanoma    Obesity Daughter x1     Melanoma Daughter x1     Cancer Son          skin    Diabetes Son          prediabetes    Diabetes Son      No Known Problems Maternal Grandmother      No Known Problems Maternal Grandfather      No Known Problems Paternal Grandmother      No Known Problems Paternal Grandfather      Amblyopia Neg Hx      Blindness Neg Hx      Cataracts Neg Hx      Glaucoma Neg Hx      Macular degeneration Neg Hx      Retinal detachment Neg Hx      Strabismus Neg Hx      Thyroid disease Neg Hx         Social History:  Social History     Tobacco Use    Smoking status: Never     Passive exposure: Never    Smokeless tobacco: Never   Substance Use Topics    Alcohol use: No     Alcohol/week: 0.0 standard drinks of alcohol    Drug use: No       Review of Systems:  Review of Systems   Unable to perform ROS: Acuity of condition        Objective:   Last 24 Hour Vital Signs:  BP  Min: 109/53  Max: 180/81  Temp  Av.2 °F (36.8 °C)  Min: 98 °F (36.7 °C)  Max: 98.6 °F (37 °C)  Pulse  Av.8  Min: 63  Max: 115  Resp  Av.3  Min: 8  Max: 20  SpO2  Av.5 %  Min: 96 %  Max: 100 %  Body mass index is  21.61 kg/m².  I & O (Last 24H):  Intake/Output Summary (Last 24 hours) at 4/21/2024 1743  Last data filed at 4/21/2024 0555  Gross per 24 hour   Intake 388.84 ml   Output --   Net 388.84 ml       Physical Exam:  GEN: Agitated, uncomfortable in bed. Not interactive  HEENT: MMM, no scleral icterus, EOMI  NECK: Supple, midline trachea, no raised JVP or a-waves notable  CV: RRR, no MRG, pulses equal and symmetric 2+ at radial  PULM: Crackles bilaterally. Decreased breath sounds at left lung base  ABDOMEN: Soft, non-tender, non-distended, no rebound or guarding  SKIN: Warm, dry, intact, no rashes  MSK: No deformity, no clubbing, cyanosis, or lower extremity edema  NEURO: Rass +2, patient not responsive, but moving all extremities spontanously  LINES: Intact, no extravasation or induration, no erythema to PIV or support devices       Assessment & Plan:   Pleural Effusion  Acute Metabolic Encephalopathy    Patient has had a recurrent pleural effusion, most recently exudative while patient was admitted in 3/2024 with pseudomonas pneumonia. Her volume status has been difficult to manage between CHF and PD and she has now gone to PD 7 days per week.     Small, simple effusion on bedside POCUS. Discussed with family. Sampling would not be in line with patient's goals to minimize invasive procedures which is reasonable.    - The patient has made good improvement with antibiotic therapy in regard to her mental status. Reasonable to continue at this time in hopes of further improvement for patient's quality of life.  - She has told children that she is tired of dialysis.  - Would continue goals of care discussions as patient continues to hopefully improve mentally.  - If she were to decline, appropriate limits of no escalation to ICU care, no pressors, no invasive procedures set by family  - Realistically, if patient does wish to discontinue PD, home with hospice would be reasonable for this patient as it is in line with her  wishes.  - Would avoid benzos to minimize delirium. Could trial zyprexa instead for non-redirectable agitation.    Amol Cronin M.D.  [facility] Pulmonary & Critical Care Fellow      Attestation to follow. Please contact me with any questions should they arise.     Amol Cronin MD  Pulm/Critical Care Fellow

## 2024-04-21 NOTE — PROGRESS NOTES
[facility] Medicine  Progress Note    Patient Name: Myesha Quinones  MRN: [id number]  Patient Class: OP- Observation   Admission Date: 4/20/2024  Length of Stay: 0 days  Attending Physician: Geri Beltrán MD  Primary Care Provider: Dayton Michael MD        Subjective:     Principal Problem:Sepsis          HPI:  Myesha Quinones is a 83-year-old F with a pmh of ESRD on PD, type 2 diabetes mellitus, hypertension, obstructive airway disease, CAD, breast cancer, and hyperlipidemia. She was recently hospitalized from 3/4/204-3/11/2024 with complaints of fatigue, lethargy, periodic involuntary jerks--was  found to have Pseudomonas pneumonia with uncomplicated parapneumonic effusion. Underwent left thoracentesis during this admission, pleural fluid cultures were negative. Respiratory cultures grew pan-sensitive Pseudomonas. Patient was seen by pulmonology and recommended 4-6 weeks of antibiotics for Pseudomonas pneumonia. Patient's symptoms improved but required supplemental oxygen at discharge. Discharged on levofloxacin 250 mg daily, end date 03/31 for 4 weeks therapy. Frequency of PD was increased from twice a week to daily as uremia thought to be contributing to periodic involuntary jerks. Was also admitted 3/12/2024-3/14/2024 for suspected syncope from hypotension combined with acute on chronic anemia. Antihypertensives were held at that time and furosemide was resumed.     Patient presented to the ED with her son in law and daughter on this am with complaints of AMS. Per the daughter, Patient was seen well at about 9:30 last night as she was going to bed. She was found about 7:00 am this morning not responding. Daughter says she found her mother in bed with eyes open. She was unable to answer any questions. Daughter states at the patients baseline she is ambulatory, can complete her ADLs, and is oriented. Code stroke activated in the ED. Vascular neuro recommended head CT- No acute  "intracranial pathology.  If concern persists for acute ischemic event, consider MRI brain for further evaluation if patient compatible. Sequela of chronic microvascular ischemic change. Stable mainly calcified extra-axial lesion along the left frontal convexity, presumed meningioma. CTA head and neck ordered but held per Neuro. Unable to perform MRI brain 2/2 ICD. [facility] neuro has been consulted. Nephrology was consulted for peritoneal dialysis. Cardiology was also consulted as her troponin has been elevated this admission--no c/o CP. EKG with SR with PACs.    Her ED workup also includes a stable hemoglobin 11.1, CO2-21, anion gap-17, Cr-3.5, glucose 194, troponin 0.064>0.097, lactate 2.5, TSH 0.997. CXR- Interstitial and alveolar opacities have progressed since the 03/12/2024 radiograph and may relate to edema, noting that infectious or noninfectious inflammatory etiology may peers similar. Left greater than right pleural effusions. Increased conspicuity of slightly nodular opacity at the left lateral mid lung zone which could relate to loculated pleural fluid. No infection noted on U/A. Will admit to the [facility] for further care.    Overview/Hospital Course:  No notes on file    Interval History: seen at the bedside, family present. Mental status has not changed, patient is extremely agitated and disorientated.     Family agrees on DNR status.  Per family: family has stated that the patient has been consistent with her wishes in the past. Recently the patient has been discussing wishing to "die and go be with her " after her upcomming birthday Tuesday. She has had a lot of difficulty PD daily and she has been telling her family that she wished she had not started it. We discussed reasonable goals of care.     Family could like to focus on the patient being comfortable and the family would like to discuss Hospice options.    We have contacted [facility]--spoke to nurse jean-claude Mathews who will call out " the liaison  .    Review of Systems   Unable to perform ROS: Acuity of condition     Objective:     Vital Signs (Most Recent):  Temp: 98 °F (36.7 °C) (04/21/24 0846)  Pulse: 71 (04/21/24 0846)  Resp: 18 (04/21/24 1007)  BP: (!) 174/74 (04/21/24 0846)  SpO2: 97 % (04/21/24 0846) Vital Signs (24h Range):  Temp:  [97.8 °F (36.6 °C)-101.3 °F (38.5 °C)] 98 °F (36.7 °C)  Pulse:  [] 71  Resp:  [8-35] 18  SpO2:  [92 %-100 %] 97 %  BP: (109-218)/(52-94) 174/74     Weight: 55.3 kg (122 lb)  Body mass index is 21.61 kg/m².     Physical Exam  Vitals and nursing note reviewed.   Constitutional:       Appearance: She is ill-appearing.      Comments: Frail elderly female   HENT:      Head: Normocephalic and atraumatic.      Nose: Nose normal.      Mouth/Throat:      Mouth: Mucous membranes are moist.   Eyes:      Extraocular Movements: Extraocular movements intact.   Cardiovascular:      Rate and Rhythm: Normal rate and regular rhythm.      Pulses: Normal pulses.      Heart sounds: Normal heart sounds.   Abdominal:      General: There is no distension.      Tenderness: There is no abdominal tenderness. There is no guarding.      Comments: +PD catheter   Musculoskeletal:      Cervical back: Normal range of motion and neck supple.   Skin:     General: Skin is warm and dry.      Capillary Refill: Capillary refill takes 2 to 3 seconds.      Findings: Bruising and rash present.   Neurological:      Mental Status: She is disoriented.      Motor: Weakness present.                Significant Labs: All pertinent labs within the past 24 hours have been reviewed.    Significant Imaging: I have reviewed all pertinent imaging results/findings within the past 24 hours.    Assessment/Plan:      * Sepsis  This patient does have evidence of infective focus  My overall impression is sepsis.  Source: Respiratory  Antibiotics given-   Antibiotics (72h ago, onward)      None          Latest lactate reviewed-  Recent Labs   Lab 04/20/24  1303    LACTATE 2.5*     Organ dysfunction indicated by Encephalopathy    Fluid challenge Not needed - patient is not hypotensive      Post- resuscitation assessment No - Post resuscitation assessment not needed       Will Not start Pressors- Levophed for MAP of 65  Source control achieved by: IV abx    Comfort measures only status  Comfort measures ordered  [facility] consulted       Nausea and vomiting  -prn IV antiemetics ordered      Focal neurological deficit  -code stroke activated in the ED  -appreciate vascular neuro eval  -[facility] neuro consulted and plan is below:    - Pt has MRI non compatible device so would recommend repeating CTH in 6-12 hrs if concern for stroke remains   - CTA held by nephrology. Might need to discuss need and options since we can not obtain any other vessel imaging other than US carotid (which does not assesses intractranial vessels and post circulation)  - Workup for toxic/metabolic/infectious abnormalities per primary  - If encephalopathy does not resolve consider EEG--EEG pending      Severe persistent chronic asthma without complication  -will continue Duonebs prn, Singulair-when she is able to take po, and Flonase. Per pulmonary outpatient she is no longer on maintenance inhalers.      Pleural effusion on left  -pleural fluid again removed while admitted back in March of 2024--studies were exudative with negative cytology.    -has heart failure as well as renal failure--on PD  -CXR on admission today (4/20)-Monitoring leads overlie the chest.  Left subclavian approach AICD. Grossly unchanged cardiomediastinal contours, again noting enlargement the cardiac silhouette and prominence of central pulmonary vasculature. Interstitial alveolar opacities have progressed since 03/12/2024 examination.  Persistent dense retrocardiac and left mid lower lung zone opacity.  More focal nodular opacity measuring on the order of 30 mm is noted in the left lateral hemithorax.  Small right and at  least moderate left pleural effusions are noted. No definite pneumothorax. No acute findings in the visualized abdomen.  Vascular calcifications postoperative sequela project in the upper abdomen. Osseous and soft tissue structures appear without definite acute change.   -CT chest pending    Acute encephalopathy  -altered on exam--family states the patients baseline is AAOx3 and ambulatory  -Head CT -No acute intracranial pathology  -code stroke activated in the ED  -[facility] neuro also consulted  -Nephrology advised against CTA head and neck --Per nephrology:really would try to avoid any type of contrast in order to try to preserve her residual renal function.  -carotid u/s pending.  -Head CT will be ordered tonight as recommended by neuro if mental status has not improved.  -EEG pending  -ICD in place-unable to obtain Brain MRI   -cont delirium precautions       Elevated troponin  -troponin 0.064>0.097 --trend  -cardiology consulted  -EKG noted with PVCs      ESRD on peritoneal dialysis  -nephrology consulted   -nephrology has requested cultures of peritoneal fluid  -monitor       ACP (advance care planning)  Advance Care Planning  Date: 04/20/2024     Code Status  In light of the patients advanced and life limiting illness,I engaged the the family in a voluntary conversation about the patient's preferences for care  at the very end of life. The patient wishes to have a natural, peaceful death.  Along those lines, the patient does not wish to have CPR or other invasive treatments performed when her heart and/or breathing stops. I communicated to the family that a DNR order would be placed in her medical record to reflect this preference.  I spent a total of 30 minutes engaging the patient in this advance care planning discussion.           Advance Care Planning  Date: 04/20/2024     [facility]  I engaged the family in a voluntary conversation about advance care planning and we specifically addressed what the goals of care  would be moving forward, in light of the patient's change in clinical status, specifically chronic conditions and AMS.  We did specifically address the patient's likely prognosis, which is poor.  We explored the patient's values and preferences for future care.  The family endorses that what is most important right now is to focus on comfort and QOL      Accordingly, we have decided that the best plan to meet the patient's goals includes  will cont DNR status at this time and cont treatment     I did explain the role for hospice care at this stage of the patient's illness, including its ability to help the patient live with the best quality of life possible.  We will be making a hospice referral at this time. [facility] been consulted.    Comfort measures have been ordered.      I spent a total of 45 minutes engaging the patient in this advance care planning discussion.         Type 2 diabetes mellitus, with long-term current use of insulin  Patient's FSGs are uncontrolled due to hyperglycemia on current medication regimen.  Last A1c reviewed-   Lab Results   Component Value Date    HGBA1C 6.0 (H) 04/20/2024     Most recent fingerstick glucose reviewed-   Recent Labs   Lab 04/20/24  1103 04/20/24  1621 04/20/24  2123 04/21/24  0510   POCTGLUCOSE 191* 249* 277* 293*         Maintain anti-hyperglycemic dose as follows-   Antihyperglycemics (From admission, onward)      Start     Stop Route Frequency Ordered    04/21/24 0011  insulin aspart U-100 pen 0-5 Units         -- SubQ Every 6 hours PRN 04/20/24 2311          Hold Oral hypoglycemics while patient is in the hospital.  Holding insulins as she is NPO.        Essential hypertension  Chronic, uncontrolled. Latest blood pressure and vitals reviewed-     Temp:  [97.8 °F (36.6 °C)-101.3 °F (38.5 °C)]   Pulse:  []   Resp:  [8-35]   BP: (109-218)/(52-94)   SpO2:  [92 %-100 %] .   Home meds for hypertension were reviewed and noted below.   Hypertension  Medications               furosemide (LASIX) 80 MG tablet Take 1 tablet (80 mg total) by mouth 2 (two) times a day.    olmesartan (BENICAR) 5 MG Tab Take 5 mg by mouth once daily.    nitroGLYCERIN (NITROSTAT) 0.4 MG SL tablet Place 0.4 mg under the tongue every 5 (five) minutes as needed for Chest pain.             Hold b/p meds 2/2 permissive HTN.    Will utilize p.r.n. blood pressure medication as needed.    Mycobacterium avium complex  -chronic   -she is followed by Dr. Maki outpatient  -will cont duo nebs as needed      ABPA (allergic bronchopulmonary aspergillosis)  -noted in the patients history  -cont duo nebs  -monitor      Major depression, recurrent, chronic  Patient has persistent depression which is unknown and is currently uncontrolled.She is not prescribed anti-depressant medications outpatient. We will not consult psychiatry at this time. Patient does not display psychosis at this time. Continue to monitor closely and adjust plan of care as needed.        Chronic combined systolic and diastolic heart failure  -TTE from 3/1/24:    Left Ventricle: The left ventricle is normal in size. There is concentric remodeling. Septal motion is consistent with pacing. There is reduced systolic function. Biplane (2D) method of discs ejection fraction is 52%.    Right Ventricle: Normal right ventricular cavity size. Systolic function is borderline low. TAPSE is 1.68 cm. Pacemaker lead present in the ventricle.    Left Atrium: Left atrium is moderately dilated.    Right Atrium: Lead present in the right atrium.    Aortic Valve: There is mild aortic valve sclerosis. Mildly restricted motion. There is mild stenosis. Aortic valve area by VTI is 1.55 cm². Aortic valve peak velocity is 1.45 m/s. Mean gradient is 5 mmHg. The dimensionless index is 0.57.    Mitral Valve: There is moderate regurgitation.    Tricuspid Valve: There is moderate regurgitation.    Pulmonary Artery: The estimated pulmonary artery systolic pressure  is 69 mmHg.    IVC/SVC: Intermediate venous pressure at 8 mmHg.    Pericardium: Left pleural effusion.    -was given a 1 time dose of lasix on yesterday evening with good response  -PD per nephrology   -TTE pending      Cardiomyopathy, ischemic  - echo 3/1/2024 with EF 52% and mild AS; history of NICM since 2004 with AICD in place   - monitor       COPD (chronic obstructive pulmonary disease)  Patient's COPD is controlled currently.  Patient is currently off COPD Pathway. Continue scheduled inhalers Supplemental oxygen and monitor respiratory status closely.     Iron deficiency anemia  Patient's anemia is currently controlled. Has not received any PRBCs to date. Etiology likely d/t chronic disease due to ESRD  Current CBC reviewed-   Lab Results   Component Value Date    HGB 11.6 (L) 04/21/2024    HGB 11.6 (L) 04/21/2024    HCT 36.9 (L) 04/21/2024    HCT 36.9 (L) 04/21/2024     Monitor serial CBC and transfuse if patient becomes hemodynamically unstable, symptomatic or H/H drops below 7/21.    Biventricular ICD (implantable cardioverter-defibrillator) in place  -noted        VTE Risk Mitigation (From admission, onward)           Ordered     heparin (porcine) injection 5,000 Units  Every 8 hours         04/20/24 1056     IP VTE HIGH RISK PATIENT  Once         04/20/24 1056     Place sequential compression device  Until discontinued         04/20/24 1056                    Discharge Planning   ALIX:      Code Status: DNR   Is the patient medically ready for discharge?:     Reason for patient still in hospital (select all that apply): Laboratory test, Treatment, Imaging, and Consult recommendations                     Brayan Frye NP  Department of Hospital Medicine   [facility]

## 2024-04-21 NOTE — PLAN OF CARE
Problem: SLP  Goal: SLP Goal  Description: Goals:  1. Patient will successfully participate in akjzdl-[id number] evaluation to further assess for any communication impairments.  2. Patient will successfully participate in clinical swallow evaluation and tolerate po trials with no overt s/s of aspiration.   Outcome: Ongoing, Progressing     4/21/2024: BSE completed. Patient consumed single sips of thin liquids. Patient continuing to present with decreased NATHANIEL and increased confusion. ST recommending NPO. ST will follow up for ongoing trials.  Harlan Lu M.S., CCC-SLP   Speech Language Pathologist

## 2024-04-21 NOTE — ASSESSMENT & PLAN NOTE
Patient's anemia is currently controlled. Has not received any PRBCs to date. Etiology likely d/t chronic disease due to ESRD  Current CBC reviewed-   Lab Results   Component Value Date    HGB 11.6 (L) 04/21/2024    HGB 11.6 (L) 04/21/2024    HCT 36.9 (L) 04/21/2024    HCT 36.9 (L) 04/21/2024     Monitor serial CBC and transfuse if patient becomes hemodynamically unstable, symptomatic or H/H drops below 7/21.

## 2024-04-21 NOTE — ASSESSMENT & PLAN NOTE
Advance Care Planning  Date: 04/20/2024     Code Status  In light of the patients advanced and life limiting illness,I engaged the the family in a voluntary conversation about the patient's preferences for care  at the very end of life. The patient wishes to have a natural, peaceful death.  Along those lines, the patient does not wish to have CPR or other invasive treatments performed when her heart and/or breathing stops. I communicated to the family that a DNR order would be placed in her medical record to reflect this preference.  I spent a total of 30 minutes engaging the patient in this advance care planning discussion.           Advance Care Planning  Date: 04/20/2024     [facility]  I engaged the family in a voluntary conversation about advance care planning and we specifically addressed what the goals of care would be moving forward, in light of the patient's change in clinical status, specifically chronic conditions and AMS.  We did specifically address the patient's likely prognosis, which is poor.  We explored the patient's values and preferences for future care.  The family endorses that what is most important right now is to focus on comfort and QOL      Accordingly, we have decided that the best plan to meet the patient's goals includes  will cont DNR status at this time and cont treatment     I did explain the role for hospice care at this stage of the patient's illness, including its ability to help the patient live with the best quality of life possible.  We will be making a hospice referral at this time. [facility].    Comfort measures have been ordered.      I spent a total of 45 minutes engaging the patient in this advance care planning discussion.

## 2024-04-21 NOTE — PT/OT/SLP EVAL
Speech Language Pathology Evaluation  Bedside Swallow    Patient Name:  Myesha Quinones   MRN:  [id number]  Admitting Diagnosis: Sepsis    Recommendations:                 General Recommendations:  Dysphagia therapy and Speech language evaluation  Diet recommendations:  NPO, NPO   Aspiration Precautions: Alternate means of nutrition/hydration   General Precautions: Standard, fall, NPO, aspiration (hard of hearing)  Communication strategies:  none    Assessment:     Myesha Quinones is a 83 y.o. female admitted with AMS. Patient presenting with decreased NATHANIEL and increased confusion. Patient is not safe for po intake at this time.     History:     HPI: Myesha Quinones is a 83-year-old F with a pmh of ESRD on PD, type 2 diabetes mellitus, hypertension, obstructive airway disease, CAD, breast cancer, and hyperlipidemia. She was recently hospitalized from 3/4/204-3/11/2024 with complaints of fatigue, lethargy, periodic involuntary jerks--was  found to have Pseudomonas pneumonia with uncomplicated parapneumonic effusion. Underwent left thoracentesis during this admission, pleural fluid cultures were negative. Respiratory cultures grew pan-sensitive Pseudomonas. Patient was seen by pulmonology and recommended 4-6 weeks of antibiotics for Pseudomonas pneumonia. Patient's symptoms improved but required supplemental oxygen at discharge. Discharged on levofloxacin 250 mg daily, end date 03/31 for 4 weeks therapy. Frequency of PD was increased from twice a week to daily as uremia thought to be contributing to periodic involuntary jerks. Was also admitted 3/12/2024-3/14/2024 for suspected syncope from hypotension combined with acute on chronic anemia. Antihypertensives were held at that time and furosemide was resumed.      Patient presented to the ED with her son in law and daughter on this am with complaints of AMS. Per the daughter, Patient was seen well at about 9:30 last night as she was going to bed. She was found  about 7:00 am this morning not responding. Daughter says she found her mother in bed with eyes open. She was unable to answer any questions. Daughter states at the patients baseline she is ambulatory, can complete her ADLs, and is oriented. Code stroke activated in the ED. Vascular neuro recommended head CT- No acute intracranial pathology.  If concern persists for acute ischemic event, consider MRI brain for further evaluation if patient compatible. Sequela of chronic microvascular ischemic change. Stable mainly calcified extra-axial lesion along the left frontal convexity, presumed meningioma. CTA head and neck ordered but held per Neuro. Unable to perform MRI brain 2/2 ICD. [facility] neuro has been consulted. Nephrology was consulted for peritoneal dialysis. Cardiology was also consulted as her troponin has been elevated this admission--no c/o CP. EKG with SR with PACs.     Her ED workup also includes a stable hemoglobin 11.1, CO2-21, anion gap-17, Cr-3.5, glucose 194, troponin 0.064>0.097, lactate 2.5, TSH 0.997. CXR- Interstitial and alveolar opacities have progressed since the 03/12/2024 radiograph and may relate to edema, noting that infectious or noninfectious inflammatory etiology may peers similar. Left greater than right pleural effusions. Increased conspicuity of slightly nodular opacity at the left lateral mid lung zone which could relate to loculated pleural fluid. No infection noted on U/A. Will admit to the [facility] service for further care.       Past Medical History:   Diagnosis Date    Acute encephalopathy 02/29/2024    Acute hypoxemic respiratory failure 12/19/2019    Acute right-sided thoracic back pain 12/09/2019    Allergy     Asthma     Basal cell carcinoma     left forehead    Basal cell carcinoma     left nose    Basal cell carcinoma 05/20/2015    right nose    Basal cell carcinoma 12/22/2015    left lower post neck    Basal cell carcinoma 12/03/2019    left ant scalp     BCC (basal cell carcinoma  of skin) 10/20/2022    Right alar groove    Bilateral pleural effusion     Bilateral renal cysts     Breast cancer     CAD (coronary artery disease)     Cardiomyopathy     Cardiomyopathy, ischemic     Cataract     Chest pain 03/04/2024    CHF (congestive heart failure)     CKD (chronic kidney disease) stage 4, GFR 15-29 ml/min     Colon polyp 2011    Controlled type 2 diabetes mellitus with both eyes affected by mild nonproliferative retinopathy and macular edema, with long-term current use of insulin 02/22/2018    COPD (chronic obstructive pulmonary disease)     COPD exacerbation 04/08/2018    Current mild episode of major depressive disorder without prior episode 01/25/2022    Defibrillator discharge     Dependence on renal dialysis 08/22/2023    Diabetes mellitus     Diabetes mellitus type II     Diabetes with neurologic complications     Edema     ESRD needing dialysis     Goals of care, counseling/discussion 07/08/2022    Goiter     MNG    Hematuria, unspecified     HX: breast cancer     Hyperlipidemia     Hypertension     Hypocalcemia     Hypokalemia     Hyponatremia     Hyponatremia 04/02/2022    Iron deficiency anemia 05/16/2017    Iron deficiency anemia     Iron deficiency anemia     Left kidney mass     Meningioma     Microalbuminuria due to type 2 diabetes mellitus 01/26/2022    Osteoporosis, postmenopausal     Pneumonia 12/08/2019    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    Proteinuria 01/21/2019    Pseudomonas pneumonia     SCC (squamous cell carcinoma) 08/25/2022    right posterior neck    Skin cancer     s/p excision    Sleep apnea     CPAP    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/2015    mid forehead    Unspecified vitamin D deficiency     UTI (urinary tract infection) 04/02/2022    Ventricular tachycardia     Vitamin B12 deficiency     Vitamin D deficiency disease        Past Surgical History:   Procedure Laterality Date    BASAL CELL CARCINOMA EXCISION      posterior neck and nose    BREAST BIOPSY       BREAST CYST EXCISION Left     BREAST SURGERY      CARDIAC DEFIBRILLATOR PLACEMENT      x 2    CATARACT EXTRACTION W/  INTRAOCULAR LENS IMPLANT Bilateral     CHOLECYSTECTOMY      COLONOSCOPY N/A 11/5/2019    Procedure: COLONOSCOPY;  Surgeon: Boaz Botello MD;  Location: [facility] END[facility] ([address] [place] [address]);  Service: Endoscopy;  Laterality: N/A;  AICD - [employer] -     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    FRACTURE SURGERY      left elbow and wrist as a child    HYSTERECTOMY      INSERTION, CATHETER, DIALYSIS, PERITONEAL, LAPAROSCOPIC N/A 4/25/2023    Procedure: INSERTION, CATHETER, DIALYSIS, PERITONEAL, LAPAROSCOPIC;  Surgeon: Marcelo Isaac MD;  Location: [facility] OR;  Service: General;  Laterality: N/A;    LAPAROSCOPIC LYSIS OF ADHESIONS N/A 4/25/2023    Procedure: LYSIS, ADHESIONS, LAPAROSCOPIC;  Surgeon: Marcelo Isaac MD;  Location: [facility] OR;  Service: General;  Laterality: N/A;    MASTECTOMY Right     OMENTOPEXY, LAPAROSCOPIC N/A 4/25/2023    Procedure: OMENTOPEXY, LAPAROSCOPIC;  Surgeon: Marcelo Isaac MD;  Location: [facility] OR;  Service: General;  Laterality: N/A;    REPLACEMENT OF IMPLANTABLE CARDIOVERTER-DEFIBRILLATOR (ICD) GENERATOR N/A 12/17/2018    Procedure: REPLACEMENT, ICD GENERATOR;  Surgeon: Jan Mckeon MD;  Location: [facility] LAB;  Service: Cardiology;  Laterality: N/A;  DONNA, CRT-D gen change, MDT, MAC, SK, 3 Prep    REVISION OF SKIN POCKET FOR CARDIOVERTER-DEFIBRILLATOR  12/17/2018    Procedure: Revision, Skin Pocket, For Cardioverter-Defibrillator;  Surgeon: Jan Mckeon MD;  Location: [facility];  Service: Cardiology;;    SQUAMOUS CELL CARCINOMA EXCISION      remved from forehead    TONSILLECTOMY         Social History: Patient lives with family    Prior Intubation HX: None this admit     Modified Barium Swallow: None per EMR    Chest X-Rays: 4/20 - Interstitial and alveolar opacities have progressed since the 03/12/2024 radiograph and may relate to edema, noting that infectious or  noninfectious inflammatory etiology may peers similar.     Left greater than right pleural effusions.     Increased conspicuity of slightly nodular opacity at the left lateral mid lung zone which could relate to loculated pleural fluid given appearance on prior chest CT versus infectious or noninfectious inflammatory airspace consolidation.  Attention on follow-up to exclude the possibility of neoplasm recommended.    Prior diet: regular/thin    Occupation/hobbies/homemaking: none stated     Subjective     RN approved ST for eval. ST entered the patients room with the patient asleep in bed. Patient with bilateral restraints in place. Patient had x2 family members present at the bedside.   Patient goals: none stated     Pain/Comfort:  Pain Rating 1: 0/10    Respiratory Status: Room air    Objective:     Oral Musculature Evaluation  Oral Musculature: general weakness  Dentition: present and adequate  Secretion Management: adequate  Mucosal Quality: good  Mandibular Strength and Mobility: WFL  Oral Labial Strength and Mobility: impaired coordination  Lingual Strength and Mobility: WFL  Velar Elevation: WFL  Buccal Strength and Mobility: WFL  Volitional Cough: weak  Voice Prior to PO Intake: clear, unintelligible    Bedside Swallow Eval:   Consistencies Assessed:  Thin liquids x5 single straw sips       Oral Phase:   WFL    Pharyngeal Phase:   no overt clinical signs/symptoms of aspiration  no overt clinical signs/symptoms of pharyngeal dysphagia    Compensatory Strategies  None    Treatment: ST repositioned the patient in bed to a 90 degree angle. Patient was able to arouse given MAX verbal and tactile cues, however, pt with decreased NATHANIEL throughout eval. Patient also with increased confusion. Patient was able to consume straw sips without overt s/s of aspiration, however, ST recommending continued NPO 2/2 decreased NATHANIEL. ST will follow.    ST completed extensive education with the patient's family on SLP role, POC,  diagnostic information, importance of utilizing safe swallowing precautions and all results/recs. The patients family verbalized good understanding. ST allowed time for all questions be asked/answered that were within ST's scope. ST notified RN and MD of all results/recs.       Goals:   Multidisciplinary Problems       SLP Goals          Problem: SLP    Goal Priority Disciplines Outcome   SLP Goal     SLP Ongoing, Progressing   Description: Goals:  1. Patient will successfully participate in [id number] evaluation to further assess for any communication impairments.  2. Patient will successfully participate in clinical swallow evaluation and tolerate po trials with no overt s/s of aspiration.                        Plan:     Patient to be seen:  2 x/week, 3 x/week   Plan of Care expires:  05/21/24  Plan of Care reviewed with:  patient, family   SLP Follow-Up:  Yes       Discharge recommendations:   (TBD)   Barriers to Discharge:  None    Time Tracking:     SLP Treatment Date:   04/21/24  Speech Start Time:  1101  Speech Stop Time:  1124     Speech Total Time (min):  23 min    Billable Minutes: Eval Swallow and Oral Function 10 and Self Care/Home Management Training 13    04/21/2024     Harlan Lu M.S., CCC-SLP   Speech Language Pathologist

## 2024-04-21 NOTE — PROGRESS NOTES
Medical Decision Making:    Admission Date: 4/20/2024     I have reviewed medical record data and the patient was evaluated. See Roseanne's note for details. This is an attestation of a separate note written by the house officer today. As the teaching physician, I was present for and have confirmed the key portions of the service performed by the resident today. In addition to the resident's note, I add the following:     Pulmonary Consult for pleural effusion.     84 yo with ESRD on PD, T2DM, COPD, and a chronic pleural effusion. In March 2024 had pseudomonas pneumonia with uncomplicated parapneumonic effusion.  Admitted for altered MS.   Admit CXR showed bilateral opacities worse versus March 2024 and probable left effusions. A Chest CT has been ordered.   POCUS by us showed ~2-cm of pleural fluid on the right.  Ms Quinones's mental status has greatly improved versus yesterday. Abx (vanc/zosyn) were started yesterday evening for T 101.   Unclear source but was vomiting yesterday so this could be due to aspiration.  We discussed the risk/benefit of a thoracentesis with her daughter. For the present, will hold off on sampling pleural fluid for now.  Ms Quinones is clearly experiencing a frailty trajectory and, when lucid as an outpt, has stated that she doesn't want to continue with aggressive medical treatment after her [birth year] ([birth year]).     Leonidas Amado MD  [facility] Pulmonary / Critical Care  [phone]

## 2024-04-21 NOTE — PLAN OF CARE
1900 Pt appeared to be in no distress or pain.     2100 accu ck: 277 covered w/one unit aspart    2200 Pt very restless, throwing legs over bed rails and attempting to climb out of bed, Son Darien requested something to calm pt down.  Pt received 2mg atavan IVP @ 1730. Nurse called Dr Rubio to advise of pt status, nurse asked for restraints and order an additional dose of ativan.   ordered restrains at 2215.      Tele: Paced,  HR  80,  1 alarm:     0033 7 beat run of vtach; vitas: 130/55,HR 68,MAP 79, Temp 98.0, 95% on room air..  Notified dr Mae, no new orders received. .     Bed in lowest position, wheels locked, non skid socks, ID band worn, personal items and call bell with in reach, bed alarm set.

## 2024-04-21 NOTE — NURSING
Rapid Response Nurse Follow-up Note     Followed up with patient for proactive rounding.   Chart reviewed. VSS. Visualized pt, pt intermittently agitated and pulling at restraints. Pt opening eyes, speech incomprehensible. Not following commands at this time. PRN ativan given 2333. Next dose available 0533. Pt cleaned and repositioned in bed, much less agitated post cleaning. Reviewed plan of care with bedside RNShaista .     Team will continue to follow.  Please call Rapid Response RN, FRANDY MCBRIDE RN with any questions or concerns at N Phone: [phone].      all other ROS negative except as per HPI

## 2024-04-21 NOTE — CONSULTS
Food & Nutrition  Education    Diet Education: Cardiac Education  Time Spent: RD remote  Learners: Patient       Nutrition Education provided with handouts:   + Clinical Reference attachments to d/c documents      Comments:  Patient currently remains NPO due to LOC.  SLP following patient.  Patient is DNR status.  Labs reviewed.  Allergies: iodine.  LBM: 4/21/24.  I/O since admit: +389mL.  RD to provide cardiac education at follow up visit if appropriate.    Patient Active Problem List   Diagnosis    History of nonmelanoma skin cancer    LBBB (left bundle branch block)    Nontoxic multinodular goiter    Meningioma    Asthma, chronic    Biventricular ICD (implantable cardioverter-defibrillator) in place    Dyspnea    Tremor    Coronary artery disease involving native coronary artery without angina pectoris - Non-obstructive 50% LAD    Type 2 diabetes mellitus with stage 4 chronic kidney disease and hypertension    History of breast cancer    Adrenal cortical nodule: repeat CT 6/2016    Calcification of aorta    Diabetic polyneuropathy associated with type 2 diabetes mellitus    Osteopenia of neck of femur    Iron deficiency anemia    Chemotherapy-induced neuropathy    Hypertensive retinopathy, bilateral    Multiple lung nodules    COPD (chronic obstructive pulmonary disease)    Mixed conductive and sensorineural hearing loss    Weakness    Cardiomyopathy, ischemic    Metabolic bone disease    Seasonal allergies    Hyperlipidemia associated with type 2 diabetes mellitus    Mild nonproliferative retinopathy due to secondary diabetes    Macular edema due to secondary diabetes    Moderate asthma    Controlled type 2 diabetes mellitus with both eyes affected by mild nonproliferative retinopathy and macular edema, with long-term current use of insulin    Chronic combined systolic and diastolic heart failure    Pneumonia of left lower lobe due to Pseudomonas species    Obstructive sleep apnea    Major depression, recurrent,  chronic    Microalbuminuria due to type 2 diabetes mellitus    White coat syndrome with diagnosis of hypertension    Urinary retention    Hypothermia    ABPA (allergic bronchopulmonary aspergillosis)    Sepsis    Mycobacterium avium complex    History of non anemic vitamin B12 deficiency    Vitreous hemorrhage, left    Retinal macroaneurysm of left eye    Essential hypertension    Type 2 diabetes mellitus, with long-term current use of insulin    Candidal urinary tract infection    ACP (advance care planning)    Purpura    ESRD on peritoneal dialysis    Type 2 diabetes mellitus with hypoglycemia, with long-term current use of insulin    Elevated troponin    ALT (SGPT) level raised    Pleurodynia    Acute encephalopathy    Pleural effusion on left    Severe persistent chronic asthma without complication    Focal neurological deficit    Nausea and vomiting    Comfort measures only status     Past Medical History:   Diagnosis Date    Acute encephalopathy 02/29/2024    Acute hypoxemic respiratory failure 12/19/2019    Acute right-sided thoracic back pain 12/09/2019    Allergy     Asthma     Basal cell carcinoma     left forehead    Basal cell carcinoma     left nose    Basal cell carcinoma 05/20/2015    right nose    Basal cell carcinoma 12/22/2015    left lower post neck    Basal cell carcinoma 12/03/2019    left ant scalp     BCC (basal cell carcinoma of skin) 10/20/2022    Right alar groove    Bilateral pleural effusion     Bilateral renal cysts     Breast cancer     CAD (coronary artery disease)     Cardiomyopathy     Cardiomyopathy, ischemic     Cataract     Chest pain 03/04/2024    CHF (congestive heart failure)     CKD (chronic kidney disease) stage 4, GFR 15-29 ml/min     Colon polyp 2011    Controlled type 2 diabetes mellitus with both eyes affected by mild nonproliferative retinopathy and macular edema, with long-term current use of insulin 02/22/2018    COPD (chronic obstructive pulmonary disease)     COPD  exacerbation 04/08/2018    Current mild episode of major depressive disorder without prior episode 01/25/2022    Defibrillator discharge     Dependence on renal dialysis 08/22/2023    Diabetes mellitus     Diabetes mellitus type II     Diabetes with neurologic complications     Edema     ESRD needing dialysis     Goals of care, counseling/discussion 07/08/2022    Goiter     MNG    Hematuria, unspecified     HX: breast cancer     Hyperlipidemia     Hypertension     Hypocalcemia     Hypokalemia     Hyponatremia     Hyponatremia 04/02/2022    Iron deficiency anemia 05/16/2017    Iron deficiency anemia     Iron deficiency anemia     Left kidney mass     Meningioma     Microalbuminuria due to type 2 diabetes mellitus 01/26/2022    Osteoporosis, postmenopausal     Pneumonia 12/08/2019    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    Proteinuria 01/21/2019    Pseudomonas pneumonia     SCC (squamous cell carcinoma) 08/25/2022    right posterior neck    Skin cancer     s/p excision    Sleep apnea     CPAP    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/2015    mid forehead    Unspecified vitamin D deficiency     UTI (urinary tract infection) 04/02/2022    Ventricular tachycardia     Vitamin B12 deficiency     Vitamin D deficiency disease      Lab Results   Component Value Date    HGBA1C 6.0 (H) 04/20/2024     Lab Results   Component Value Date    CALCIUM 7.8 (L) 04/21/2024    CALCIUM 7.8 (L) 04/21/2024    PHOS 5.8 (H) 04/21/2024    PHOS 5.8 (H) 04/21/2024     BMP  Lab Results   Component Value Date     04/21/2024     04/21/2024    K 4.0 04/21/2024    K 4.0 04/21/2024     04/21/2024     04/21/2024    CO2 19 (L) 04/21/2024    CO2 19 (L) 04/21/2024    BUN 84 (H) 04/21/2024    BUN 84 (H) 04/21/2024    CREATININE 4.4 (H) 04/21/2024    CREATININE 4.4 (H) 04/21/2024    CALCIUM 7.8 (L) 04/21/2024    CALCIUM 7.8 (L) 04/21/2024    ANIONGAP 18 (H) 04/21/2024    ANIONGAP 18 (H) 04/21/2024    EGFRNORACEVR 9 (A) 04/21/2024     EGFRNORACEVR 9 (A) 04/21/2024     Nutrition Related Social Determinants of Health: SDOH: None Identified      All questions and concerns answered. Dietitian's contact information provided.       Follow-Up:Yes     Please Re-consult as needed        Thanks,  Tatiana Newsome, MS, RDN, LDN

## 2024-04-21 NOTE — CARE UPDATE
Patient was seen again on afternoon rounds.  Doing much better after receiving p.r.n. Ativan and a 1 time dose of Dilaudid.  Spoke to the family again at the bedside who at this time request to hold comfort measures and continue treatment.  Will resume lab draws as well as standard nursing orders.  Appreciate speech therapy's evaluation---will continue strict NPO until reassessment in the a.m..  Case discussed with Dr. Beltrán, we will continue to hold long-acting insulin as the patient is not taking in any oral.  Will continue to monitor Accu-Cheks q.6 and continue as needed short acting insulin treatments.  Palliative Care will be consulted in the a.m..  Appreciate all consultants on board---Pulmonary as well as Nephrology teams.      Brayan Frye NP

## 2024-04-21 NOTE — ASSESSMENT & PLAN NOTE
Chronic, uncontrolled. Latest blood pressure and vitals reviewed-     Temp:  [97.8 °F (36.6 °C)-101.3 °F (38.5 °C)]   Pulse:  []   Resp:  [8-35]   BP: (109-218)/(52-94)   SpO2:  [92 %-100 %] .   Home meds for hypertension were reviewed and noted below.   Hypertension Medications               furosemide (LASIX) 80 MG tablet Take 1 tablet (80 mg total) by mouth 2 (two) times a day.    olmesartan (BENICAR) 5 MG Tab Take 5 mg by mouth once daily.    nitroGLYCERIN (NITROSTAT) 0.4 MG SL tablet Place 0.4 mg under the tongue every 5 (five) minutes as needed for Chest pain.             Hold b/p meds 2/2 permissive HTN.    Will utilize p.r.n. blood pressure medication as needed.

## 2024-04-21 NOTE — SUBJECTIVE & OBJECTIVE
"Interval History: seen at the bedside, family present. Mental status has not changed, patient is extremely agitated and disorientated.     Family agrees on DNR status.  Per family: family has stated that the patient has been consistent with her wishes in the past. Recently the patient has been discussing wishing to "die and go be with her " after her upcomming birthday Tuesday. She has had a lot of difficulty PD daily and she has been telling her family that she wished she had not started it. We discussed reasonable goals of care.     Family could like to focus on the patient being comfortable and the family would like to discuss Hospice options.    We have contacted [facility]--spoke to nurse jean-claude Mathews who will call out the liaison  .    Review of Systems   Unable to perform ROS: Acuity of condition     Objective:     Vital Signs (Most Recent):  Temp: 98 °F (36.7 °C) (04/21/24 0846)  Pulse: 71 (04/21/24 0846)  Resp: 18 (04/21/24 1007)  BP: (!) 174/74 (04/21/24 0846)  SpO2: 97 % (04/21/24 0846) Vital Signs (24h Range):  Temp:  [97.8 °F (36.6 °C)-101.3 °F (38.5 °C)] 98 °F (36.7 °C)  Pulse:  [] 71  Resp:  [8-35] 18  SpO2:  [92 %-100 %] 97 %  BP: (109-218)/(52-94) 174/74     Weight: 55.3 kg (122 lb)  Body mass index is 21.61 kg/m².     Physical Exam  Vitals and nursing note reviewed.   Constitutional:       Appearance: She is ill-appearing.      Comments: Frail elderly female   HENT:      Head: Normocephalic and atraumatic.      Nose: Nose normal.      Mouth/Throat:      Mouth: Mucous membranes are moist.   Eyes:      Extraocular Movements: Extraocular movements intact.   Cardiovascular:      Rate and Rhythm: Normal rate and regular rhythm.      Pulses: Normal pulses.      Heart sounds: Normal heart sounds.   Abdominal:      General: There is no distension.      Tenderness: There is no abdominal tenderness. There is no guarding.      Comments: +PD catheter   Musculoskeletal:      Cervical back: " Normal range of motion and neck supple.   Skin:     General: Skin is warm and dry.      Capillary Refill: Capillary refill takes 2 to 3 seconds.      Findings: Bruising and rash present.   Neurological:      Mental Status: She is disoriented.      Motor: Weakness present.                Significant Labs: All pertinent labs within the past 24 hours have been reviewed.    Significant Imaging: I have reviewed all pertinent imaging results/findings within the past 24 hours.

## 2024-04-21 NOTE — PT/OT/SLP PROGRESS
Physical Therapy      Patient Name:  Myesha Quinones   MRN:  [id number]    Patient not seen today secondary to Other (Comment). Pt in restraints and not oriented to situation; very confused/agitated, limited/no verbal responses; able to open eyes with increased verbal cues and tactile stimulation; pt not safe to participate in therapy evaluation at this time. NP present as therapist exiting room and discusses pt/family plans to transfer to hospice pending further family conversation; per NP therapy orders to be d/c.     4/21/24- 08:26

## 2024-04-21 NOTE — ASSESSMENT & PLAN NOTE
-TTE from 3/1/24:    Left Ventricle: The left ventricle is normal in size. There is concentric remodeling. Septal motion is consistent with pacing. There is reduced systolic function. Biplane (2D) method of discs ejection fraction is 52%.    Right Ventricle: Normal right ventricular cavity size. Systolic function is borderline low. TAPSE is 1.68 cm. Pacemaker lead present in the ventricle.    Left Atrium: Left atrium is moderately dilated.    Right Atrium: Lead present in the right atrium.    Aortic Valve: There is mild aortic valve sclerosis. Mildly restricted motion. There is mild stenosis. Aortic valve area by VTI is 1.55 cm². Aortic valve peak velocity is 1.45 m/s. Mean gradient is 5 mmHg. The dimensionless index is 0.57.    Mitral Valve: There is moderate regurgitation.    Tricuspid Valve: There is moderate regurgitation.    Pulmonary Artery: The estimated pulmonary artery systolic pressure is 69 mmHg.    IVC/SVC: Intermediate venous pressure at 8 mmHg.    Pericardium: Left pleural effusion.    -was given a 1 time dose of lasix on yesterday evening with good response  -PD per nephrology   -TTE pending

## 2024-04-21 NOTE — PT/OT/SLP PROGRESS
Occupational Therapy      Patient Name:  Myesha Quinones   MRN:  [id number]    Patient not seen today secondary to pt agitated and unable to follow instructions.  NP present as therapist exiting room and discusses pt/family plans to transfer to hospice pending further family conversation; per NP therapy orders to be d/c.     Betsy Juarez OT  4/21/2024

## 2024-04-22 ENCOUNTER — DOCUMENT SCAN (OUTPATIENT)
Dept: HOME HEALTH SERVICES | Facility: HOSPITAL | Age: 84
End: 2024-04-22

## 2024-04-22 ENCOUNTER — DOCUMENT SCAN (OUTPATIENT)
Dept: HOME HEALTH SERVICES | Facility: HOSPITAL | Age: 84
End: 2024-04-22
Payer: MEDICARE

## 2024-04-22 PROBLEM — N30.00 ACUTE CYSTITIS: Status: ACTIVE | Noted: 2024-04-22

## 2024-04-22 LAB
ANION GAP SERPL CALC-SCNC: 15 MMOL/L (ref 8–16)
BACTERIA #/AREA URNS HPF: ABNORMAL /HPF
BASOPHILS # BLD AUTO: 0.05 K/UL (ref 0–0.2)
BASOPHILS NFR BLD: 0.4 % (ref 0–1.9)
BILIRUB UR QL STRIP: NEGATIVE
BUN SERPL-MCNC: 91 MG/DL (ref 8–23)
CALCIUM SERPL-MCNC: 7.1 MG/DL (ref 8.7–10.5)
CHLORIDE SERPL-SCNC: 105 MMOL/L (ref 95–110)
CLARITY UR: ABNORMAL
CO2 SERPL-SCNC: 22 MMOL/L (ref 23–29)
COLOR UR: YELLOW
CREAT SERPL-MCNC: 4.9 MG/DL (ref 0.5–1.4)
DIFFERENTIAL METHOD BLD: ABNORMAL
EOSINOPHIL # BLD AUTO: 0 K/UL (ref 0–0.5)
EOSINOPHIL NFR BLD: 0 % (ref 0–8)
ERYTHROCYTE [DISTWIDTH] IN BLOOD BY AUTOMATED COUNT: 14.6 % (ref 11.5–14.5)
EST. GFR  (NO RACE VARIABLE): 8 ML/MIN/1.73 M^2
GLUCOSE SERPL-MCNC: 191 MG/DL (ref 70–110)
GLUCOSE UR QL STRIP: ABNORMAL
HCT VFR BLD AUTO: 30.8 % (ref 37–48.5)
HGB BLD-MCNC: 9.7 G/DL (ref 12–16)
HGB UR QL STRIP: ABNORMAL
HYALINE CASTS #/AREA URNS LPF: 0 /LPF
IMM GRANULOCYTES # BLD AUTO: 0.07 K/UL (ref 0–0.04)
IMM GRANULOCYTES NFR BLD AUTO: 0.6 % (ref 0–0.5)
KETONES UR QL STRIP: NEGATIVE
LEUKOCYTE ESTERASE UR QL STRIP: ABNORMAL
LYMPHOCYTES # BLD AUTO: 1.4 K/UL (ref 1–4.8)
LYMPHOCYTES NFR BLD: 11.8 % (ref 18–48)
MAGNESIUM SERPL-MCNC: 1.9 MG/DL (ref 1.6–2.6)
MCH RBC QN AUTO: 30 PG (ref 27–31)
MCHC RBC AUTO-ENTMCNC: 31.5 G/DL (ref 32–36)
MCV RBC AUTO: 95 FL (ref 82–98)
MICROSCOPIC COMMENT: ABNORMAL
MONOCYTES # BLD AUTO: 0.8 K/UL (ref 0.3–1)
MONOCYTES NFR BLD: 6.3 % (ref 4–15)
NEUTROPHILS # BLD AUTO: 9.8 K/UL (ref 1.8–7.7)
NEUTROPHILS NFR BLD: 80.9 % (ref 38–73)
NITRITE UR QL STRIP: NEGATIVE
NON-SQ EPI CELLS #/AREA URNS HPF: 1 /HPF
NRBC BLD-RTO: 0 /100 WBC
PH UR STRIP: 7 [PH] (ref 5–8)
PLATELET # BLD AUTO: 209 K/UL (ref 150–450)
PMV BLD AUTO: 10.5 FL (ref 9.2–12.9)
POCT GLUCOSE: 175 MG/DL (ref 70–110)
POCT GLUCOSE: 177 MG/DL (ref 70–110)
POCT GLUCOSE: 201 MG/DL (ref 70–110)
POCT GLUCOSE: 301 MG/DL (ref 70–110)
POTASSIUM SERPL-SCNC: 3.2 MMOL/L (ref 3.5–5.1)
PROT UR QL STRIP: ABNORMAL
RBC # BLD AUTO: 3.23 M/UL (ref 4–5.4)
RBC #/AREA URNS HPF: 16 /HPF (ref 0–4)
SODIUM SERPL-SCNC: 142 MMOL/L (ref 136–145)
SP GR UR STRIP: 1.03 (ref 1–1.03)
SQUAMOUS #/AREA URNS HPF: 20 /HPF
URN SPEC COLLECT METH UR: ABNORMAL
UROBILINOGEN UR STRIP-ACNC: NEGATIVE EU/DL
WBC # BLD AUTO: 12.13 K/UL (ref 3.9–12.7)
WBC #/AREA URNS HPF: 33 /HPF (ref 0–5)

## 2024-04-22 PROCEDURE — 27202073 HC PERITONEAL DIALYSIS SET-UP & CANCEL

## 2024-04-22 PROCEDURE — 97535 SELF CARE MNGMENT TRAINING: CPT

## 2024-04-22 PROCEDURE — 25000003 PHARM REV CODE 250: Performed by: INTERNAL MEDICINE

## 2024-04-22 PROCEDURE — 87086 URINE CULTURE/COLONY COUNT: CPT | Performed by: STUDENT IN AN ORGANIZED HEALTH CARE EDUCATION/TRAINING PROGRAM

## 2024-04-22 PROCEDURE — 63600175 PHARM REV CODE 636 W HCPCS: Performed by: NURSE PRACTITIONER

## 2024-04-22 PROCEDURE — 94761 N-INVAS EAR/PLS OXIMETRY MLT: CPT

## 2024-04-22 PROCEDURE — 99900035 HC TECH TIME PER 15 MIN (STAT)

## 2024-04-22 PROCEDURE — 1158F ADVNC CARE PLAN TLK DOCD: CPT | Mod: CPTII,,, | Performed by: STUDENT IN AN ORGANIZED HEALTH CARE EDUCATION/TRAINING PROGRAM

## 2024-04-22 PROCEDURE — 25000003 PHARM REV CODE 250: Performed by: NURSE PRACTITIONER

## 2024-04-22 PROCEDURE — 80048 BASIC METABOLIC PNL TOTAL CA: CPT | Performed by: NURSE PRACTITIONER

## 2024-04-22 PROCEDURE — 63600175 PHARM REV CODE 636 W HCPCS: Performed by: INTERNAL MEDICINE

## 2024-04-22 PROCEDURE — 99223 1ST HOSP IP/OBS HIGH 75: CPT | Mod: ,,, | Performed by: STUDENT IN AN ORGANIZED HEALTH CARE EDUCATION/TRAINING PROGRAM

## 2024-04-22 PROCEDURE — 85025 COMPLETE CBC W/AUTO DIFF WBC: CPT | Performed by: NURSE PRACTITIONER

## 2024-04-22 PROCEDURE — A4216 STERILE WATER/SALINE, 10 ML: HCPCS | Performed by: NURSE PRACTITIONER

## 2024-04-22 PROCEDURE — 83735 ASSAY OF MAGNESIUM: CPT | Performed by: NURSE PRACTITIONER

## 2024-04-22 PROCEDURE — 99497 ADVNCD CARE PLAN 30 MIN: CPT | Mod: 25,,, | Performed by: STUDENT IN AN ORGANIZED HEALTH CARE EDUCATION/TRAINING PROGRAM

## 2024-04-22 PROCEDURE — 25000003 PHARM REV CODE 250: Performed by: STUDENT IN AN ORGANIZED HEALTH CARE EDUCATION/TRAINING PROGRAM

## 2024-04-22 PROCEDURE — 63600175 PHARM REV CODE 636 W HCPCS: Performed by: STUDENT IN AN ORGANIZED HEALTH CARE EDUCATION/TRAINING PROGRAM

## 2024-04-22 PROCEDURE — 81000 URINALYSIS NONAUTO W/SCOPE: CPT | Performed by: STUDENT IN AN ORGANIZED HEALTH CARE EDUCATION/TRAINING PROGRAM

## 2024-04-22 PROCEDURE — 51798 US URINE CAPACITY MEASURE: CPT

## 2024-04-22 PROCEDURE — 27000221 HC OXYGEN, UP TO 24 HOURS

## 2024-04-22 PROCEDURE — 99498 ADVNCD CARE PLAN ADDL 30 MIN: CPT | Mod: ,,, | Performed by: STUDENT IN AN ORGANIZED HEALTH CARE EDUCATION/TRAINING PROGRAM

## 2024-04-22 PROCEDURE — 36415 COLL VENOUS BLD VENIPUNCTURE: CPT | Performed by: NURSE PRACTITIONER

## 2024-04-22 PROCEDURE — 11000001 HC ACUTE MED/SURG PRIVATE ROOM

## 2024-04-22 PROCEDURE — 1152F DOC ADVNCD DIS COMFORT 1ST: CPT | Mod: CPTII,,, | Performed by: STUDENT IN AN ORGANIZED HEALTH CARE EDUCATION/TRAINING PROGRAM

## 2024-04-22 PROCEDURE — 92526 ORAL FUNCTION THERAPY: CPT

## 2024-04-22 RX ORDER — FAMOTIDINE 20 MG/1
20 TABLET, FILM COATED ORAL DAILY
Status: DISCONTINUED | OUTPATIENT
Start: 2024-04-22 | End: 2024-04-24 | Stop reason: HOSPADM

## 2024-04-22 RX ORDER — ACETAMINOPHEN 325 MG/1
650 TABLET ORAL EVERY 6 HOURS PRN
Status: DISCONTINUED | OUTPATIENT
Start: 2024-04-22 | End: 2024-04-24 | Stop reason: HOSPADM

## 2024-04-22 RX ORDER — SEVELAMER CARBONATE FOR ORAL SUSPENSION 800 MG/1
0.8 POWDER, FOR SUSPENSION ORAL
Status: DISCONTINUED | OUTPATIENT
Start: 2024-04-22 | End: 2024-04-24 | Stop reason: HOSPADM

## 2024-04-22 RX ORDER — SIMETHICONE 125 MG
125 TABLET,CHEWABLE ORAL EVERY 6 HOURS PRN
Status: DISCONTINUED | OUTPATIENT
Start: 2024-04-22 | End: 2024-04-24 | Stop reason: HOSPADM

## 2024-04-22 RX ADMIN — PIPERACILLIN AND TAZOBACTAM 4.5 G: 4; .5 INJECTION, POWDER, LYOPHILIZED, FOR SOLUTION INTRAVENOUS; PARENTERAL at 06:04

## 2024-04-22 RX ADMIN — INSULIN ASPART 4 UNITS: 100 INJECTION, SOLUTION INTRAVENOUS; SUBCUTANEOUS at 05:04

## 2024-04-22 RX ADMIN — HEPARIN SODIUM 5000 UNITS: 5000 INJECTION INTRAVENOUS; SUBCUTANEOUS at 06:04

## 2024-04-22 RX ADMIN — SEVELAMER CARBONATE 0.8 G: 800 POWDER, FOR SUSPENSION ORAL at 05:04

## 2024-04-22 RX ADMIN — SODIUM BICARBONATE 1300 MG: 650 TABLET ORAL at 12:04

## 2024-04-22 RX ADMIN — Medication 10 ML: at 06:04

## 2024-04-22 RX ADMIN — POTASSIUM BICARBONATE 25 MEQ: 978 TABLET, EFFERVESCENT ORAL at 12:04

## 2024-04-22 RX ADMIN — VANCOMYCIN HYDROCHLORIDE 750 MG: 750 INJECTION, POWDER, LYOPHILIZED, FOR SOLUTION INTRAVENOUS at 04:04

## 2024-04-22 RX ADMIN — FUROSEMIDE 80 MG: 10 INJECTION, SOLUTION INTRAVENOUS at 09:04

## 2024-04-22 RX ADMIN — HEPARIN SODIUM 5000 UNITS: 5000 INJECTION INTRAVENOUS; SUBCUTANEOUS at 02:04

## 2024-04-22 RX ADMIN — SODIUM BICARBONATE 1300 MG: 650 TABLET ORAL at 09:04

## 2024-04-22 RX ADMIN — ACETAMINOPHEN 650 MG: 325 TABLET ORAL at 02:04

## 2024-04-22 RX ADMIN — INSULIN ASPART 1 UNITS: 100 INJECTION, SOLUTION INTRAVENOUS; SUBCUTANEOUS at 12:04

## 2024-04-22 RX ADMIN — HEPARIN SODIUM 5000 UNITS: 5000 INJECTION INTRAVENOUS; SUBCUTANEOUS at 09:04

## 2024-04-22 RX ADMIN — SEVELAMER CARBONATE 0.8 G: 800 POWDER, FOR SUSPENSION ORAL at 12:04

## 2024-04-22 RX ADMIN — ASPIRIN 81 MG CHEWABLE TABLET 81 MG: 81 TABLET CHEWABLE at 09:04

## 2024-04-22 RX ADMIN — HEPARIN SODIUM 5000 UNITS: 5000 INJECTION INTRAVENOUS; SUBCUTANEOUS at 12:04

## 2024-04-22 RX ADMIN — SODIUM BICARBONATE 1300 MG: 650 TABLET ORAL at 05:04

## 2024-04-22 RX ADMIN — INSULIN DETEMIR 8 UNITS: 100 INJECTION, SOLUTION SUBCUTANEOUS at 02:04

## 2024-04-22 RX ADMIN — PIPERACILLIN AND TAZOBACTAM 4.5 G: 4; .5 INJECTION, POWDER, LYOPHILIZED, FOR SOLUTION INTRAVENOUS; PARENTERAL at 09:04

## 2024-04-22 RX ADMIN — FAMOTIDINE 20 MG: 20 TABLET ORAL at 05:04

## 2024-04-22 NOTE — ASSESSMENT & PLAN NOTE
Advance Care Planning  Date: 04/20/2024     Code Status  In light of the patients advanced and life limiting illness,I engaged the the family in a voluntary conversation about the patient's preferences for care  at the very end of life. The patient wishes to have a natural, peaceful death.  Along those lines, the patient does not wish to have CPR or other invasive treatments performed when her heart and/or breathing stops. I communicated to the family that a DNR order would be placed in her medical record to reflect this preference.  I spent a total of 30 minutes engaging the patient in this advance care planning discussion.           Advance Care Planning  Date: 04/20/2024     [facility]  I engaged the family in a voluntary conversation about advance care planning and we specifically addressed what the goals of care would be moving forward, in light of the patient's change in clinical status, specifically chronic conditions and AMS.  We did specifically address the patient's likely prognosis, which is poor.  We explored the patient's values and preferences for future care.  The family endorses that what is most important right now is to focus on comfort and QOL      Accordingly, we have decided that the best plan to meet the patient's goals includes  will cont DNR status at this time and cont treatment     I did explain the role for hospice care at this stage of the patient's illness, including its ability to help the patient live with the best quality of life possible.  We will be making a hospice referral at this time. [facility] been consulted.    Comfort measures had been ordered but since mental status is slightly improving family asked us to hold comfort measures and cont treatment. Palliative med has been consulted.      I spent a total of 45 minutes engaging the patient in this advance care planning discussion.

## 2024-04-22 NOTE — PLAN OF CARE
Problem: SLP  Goal: SLP Goal  Description: Goals:  1. Patient will successfully participate in clinical swallow evaluation and tolerate po trials with no overt s/s of aspiration.   Outcome: Met   Pt to be advanced to mechanical soft/minced moist diet and thin liquids, son requesting boost with meals. Pt is more awake and asking for water. Notified team of diet recs.

## 2024-04-22 NOTE — PROGRESS NOTES
[facility] Medicine  Progress Note    Patient Name: Myesha Quinones  MRN: [id number]  Patient Class: IP- Inpatient   Admission Date: 4/20/2024  Length of Stay: 1 days  Attending Physician: Geri Beltrán MD  Primary Care Provider: Dayton Michael MD        Subjective:     Principal Problem:Sepsis          HPI:  Myesha Quinones is a 83-year-old F with a pmh of ESRD on PD, type 2 diabetes mellitus, hypertension, obstructive airway disease, CAD, breast cancer, and hyperlipidemia. She was recently hospitalized from 3/4/204-3/11/2024 with complaints of fatigue, lethargy, periodic involuntary jerks--was  found to have Pseudomonas pneumonia with uncomplicated parapneumonic effusion. Underwent left thoracentesis during this admission, pleural fluid cultures were negative. Respiratory cultures grew pan-sensitive Pseudomonas. Patient was seen by pulmonology and recommended 4-6 weeks of antibiotics for Pseudomonas pneumonia. Patient's symptoms improved but required supplemental oxygen at discharge. Discharged on levofloxacin 250 mg daily, end date 03/31 for 4 weeks therapy. Frequency of PD was increased from twice a week to daily as uremia thought to be contributing to periodic involuntary jerks. Was also admitted 3/12/2024-3/14/2024 for suspected syncope from hypotension combined with acute on chronic anemia. Antihypertensives were held at that time and furosemide was resumed.     Patient presented to the ED with her son in law and daughter on this am with complaints of AMS. Per the daughter, Patient was seen well at about 9:30 last night as she was going to bed. She was found about 7:00 am this morning not responding. Daughter says she found her mother in bed with eyes open. She was unable to answer any questions. Daughter states at the patients baseline she is ambulatory, can complete her ADLs, and is oriented. Code stroke activated in the ED. Vascular neuro recommended head CT- No acute  "intracranial pathology.  If concern persists for acute ischemic event, consider MRI brain for further evaluation if patient compatible. Sequela of chronic microvascular ischemic change. Stable mainly calcified extra-axial lesion along the left frontal convexity, presumed meningioma. CTA head and neck ordered but held per Neuro. Unable to perform MRI brain 2/2 ICD. [facility] neuro has been consulted. Nephrology was consulted for peritoneal dialysis. Cardiology was also consulted as her troponin has been elevated this admission--no c/o CP. EKG with SR with PACs.    Her ED workup also includes a stable hemoglobin 11.1, CO2-21, anion gap-17, Cr-3.5, glucose 194, troponin 0.064>0.097, lactate 2.5, TSH 0.997. CXR- Interstitial and alveolar opacities have progressed since the 03/12/2024 radiograph and may relate to edema, noting that infectious or noninfectious inflammatory etiology may peers similar. Left greater than right pleural effusions. Increased conspicuity of slightly nodular opacity at the left lateral mid lung zone which could relate to loculated pleural fluid. No infection noted on U/A. Will admit [facility] the [facility] for further care.    Overview/Hospital Course:  No notes on file    Interval History: seen at the bedside, family present. Mental status has not changed, patient is extremely agitated and disorientated.     Family agrees on DNR status.  Per family: family has stated that the patient has been consistent with her wishes in the past. Recently the patient has been discussing wishing to "die and go be with her " after her upcomming birthday Tuesday. She has had a lot of difficulty PD daily and she has been telling her family that she wished she had not started it. We discussed reasonable goals of care.     Palliative Medicine has been consulted.  Family has asked to hold comfort measure order---will resume blood draws as well as other nursing orders.  Speech therapy was reconsulted---diet has been " ordered.  We will follow.  .    Review of Systems   Unable to perform ROS: Acuity of condition     Objective:     Vital Signs (Most Recent):  Temp: 97.4 °F (36.3 °C) (04/22/24 0734)  Pulse: 75 (04/22/24 0813)  Resp: 18 (04/22/24 0813)  BP: (!) 179/90 (04/22/24 0734)  SpO2: 99 % (04/22/24 0813) Vital Signs (24h Range):  Temp:  [97.1 °F (36.2 °C)-98.2 °F (36.8 °C)] 97.4 °F (36.3 °C)  Pulse:  [] 75  Resp:  [14-18] 18  SpO2:  [96 %-100 %] 99 %  BP: (133-192)/(56-90) 179/90     Weight: 54.5 kg (120 lb 2.4 oz)  Body mass index is 21.28 kg/m².     Physical Exam  Vitals and nursing note reviewed.   Constitutional:       Appearance: She is ill-appearing.      Comments: Frail elderly female   HENT:      Head: Normocephalic and atraumatic.      Nose: Nose normal.      Mouth/Throat:      Mouth: Mucous membranes are moist.   Eyes:      Extraocular Movements: Extraocular movements intact.   Cardiovascular:      Rate and Rhythm: Normal rate and regular rhythm.      Pulses: Normal pulses.      Heart sounds: Normal heart sounds.   Abdominal:      General: There is no distension.      Tenderness: There is no abdominal tenderness. There is no guarding.      Comments: +PD catheter   Musculoskeletal:      Cervical back: Normal range of motion and neck supple.   Skin:     General: Skin is warm and dry.      Capillary Refill: Capillary refill takes 2 to 3 seconds.      Findings: Bruising and rash present.   Neurological:      Mental Status: She is disoriented.      Motor: Weakness present.                Significant Labs: All pertinent labs within the past 24 hours have been reviewed.    Significant Imaging: I have reviewed all pertinent imaging results/findings within the past 24 hours.    Assessment/Plan:      * Sepsis  This patient does have evidence of infective focus  My overall impression is sepsis.  Source: Respiratory  Antibiotics given-   Antibiotics (72h ago, onward)      Start     Stop Route Frequency Ordered    04/20/24  "1645  piperacillin-tazobactam (ZOSYN) 4.5 g in dextrose 5 % in water (D5W) 100 mL IVPB (MB+)         -- IV Every 12 hours (non-standard times) 04/20/24 1541    04/20/24 1641  vancomycin - pharmacy to dose  (vancomycin IVPB (PEDS and ADULTS))        Placed in "And" Linked Group    -- IV pharmacy to manage frequency 04/20/24 1541          Latest lactate reviewed-  Recent Labs   Lab 04/20/24  1815   LACTATE 1.1       Organ dysfunction indicated by Encephalopathy    Fluid challenge Not needed - patient is not hypotensive      Post- resuscitation assessment No - Post resuscitation assessment not needed       Will Not start Pressors- Levophed for MAP of 65  Source control achieved by: IV abx    Acute cystitis  U/A positive for UTI  Will cont abx      Comfort measures only status  Comfort measures were ordered but now on hold  [facility]  over the weekend  Palliative med consulted--case dw Dr. Storey      Nausea and vomiting  -prn IV antiemetics ordered      Focal neurological deficit  -code stroke activated in the ED  -appreciate vascular neuro eval  -[facility] neuro consulted and plan is below:    - Pt has MRI non compatible device so would recommend repeating CTH in 6-12 hrs if concern for stroke remains   - CTA held by nephrology. Might need to discuss need and options since we can not obtain any other vessel imaging other than US carotid (which does not assesses intractranial vessels and post circulation)  - Workup for toxic/metabolic/infectious abnormalities per primary  - If encephalopathy does not resolve consider EEG--EEG pending      Severe persistent chronic asthma without complication  -will continue Duonebs prn, Singulair-when she is able to take po, and Flonase. Per pulmonary outpatient she is no longer on maintenance inhalers.      Pleural effusion on left  -pleural fluid again removed while admitted back in March of 2024--studies were exudative with negative cytology.    -has heart failure as " well as renal failure--on PD  -CXR on admission today (4/20)-Monitoring leads overlie the chest.  Left subclavian approach AICD. Grossly unchanged cardiomediastinal contours, again noting enlargement the cardiac silhouette and prominence of central pulmonary vasculature. Interstitial alveolar opacities have progressed since 03/12/2024 examination.  Persistent dense retrocardiac and left mid lower lung zone opacity.  More focal nodular opacity measuring on the order of 30 mm is noted in the left lateral hemithorax.  Small right and at least moderate left pleural effusions are noted. No definite pneumothorax. No acute findings in the visualized abdomen.  Vascular calcifications postoperative sequela project in the upper abdomen. Osseous and soft tissue structures appear without definite acute change.   -CT chest pending    Acute encephalopathy  -altered on exam--family states the patients baseline is AAOx3 and ambulatory  -Head CT -No acute intracranial pathology  -code stroke activated in the ED  -[facility] neuro also consulted  -Nephrology advised against CTA head and neck --Per nephrology:really would try to avoid any type of contrast in order to try to preserve her residual renal function.  -carotid u/s pending.  -Head CT will be ordered tonight as recommended by neuro if mental status has not improved.  -EEG pending  -ICD in place-unable to obtain Brain MRI   -cont delirium precautions       Elevated troponin  -troponin 0.064>0.097 --trend  -cardiology consulted  -EKG noted with PVCs      ESRD on peritoneal dialysis  -nephrology consulted   -nephrology has requested cultures of peritoneal fluid  -monitor       ACP (advance care planning)  Advance Care Planning  Date: 04/20/2024     Code Status  In light of the patients advanced and life limiting illness,I engaged the the family in a voluntary conversation about the patient's preferences for care  at the very end of life. The patient wishes to have a natural, peaceful  death.  Along those lines, the patient does not wish to have CPR or other invasive treatments performed when her heart and/or breathing stops. I communicated to the family that a DNR order would be placed in her medical record to reflect this preference.  I spent a total of 30 minutes engaging the patient in this advance care planning discussion.           Advance Care Planning  Date: 04/20/2024     [facility]  I engaged the family in a voluntary conversation about advance care planning and we specifically addressed what the goals of care would be moving forward, in light of the patient's change in clinical status, specifically chronic conditions and AMS.  We did specifically address the patient's likely prognosis, which is poor.  We explored the patient's values and preferences for future care.  The family endorses that what is most important right now is to focus on comfort and QOL      Accordingly, we have decided that the best plan to meet the patient's goals includes  will cont DNR status at this time and cont treatment     I did explain the role for hospice care at this stage of the patient's illness, including its ability to help the patient live with the best quality of life possible.  We will be making a hospice referral at this time. [facility].    Comfort measures had been ordered but since mental status is slightly improving family asked us to hold comfort measures and cont treatment. Palliative med has been consulted.      I spent a total of 45 minutes engaging the patient in this advance care planning discussion.         Type 2 diabetes mellitus, with long-term current use of insulin  Patient's FSGs are uncontrolled due to hyperglycemia on current medication regimen.  Last A1c reviewed-   Lab Results   Component Value Date    HGBA1C 6.0 (H) 04/20/2024     Most recent fingerstick glucose reviewed-   Recent Labs   Lab 04/21/24  1641 04/21/24  2101 04/22/24  0021 04/22/24  0606    POCTGLUCOSE 173* 202* 201* 175*         Maintain anti-hyperglycemic dose as follows-   Antihyperglycemics (From admission, onward)      Start     Stop Route Frequency Ordered    04/21/24 0011  insulin aspart U-100 pen 0-5 Units         -- SubQ Every 6 hours PRN 04/20/24 2311          Hold Oral hypoglycemics while patient is in the hospital.  Holding insulins as she is NPO.        Essential hypertension  Chronic, uncontrolled. Latest blood pressure and vitals reviewed-     Temp:  [97.1 °F (36.2 °C)-98.2 °F (36.8 °C)]   Pulse:  []   Resp:  [14-18]   BP: (133-192)/(56-90)   SpO2:  [96 %-100 %] .   Home meds for hypertension were reviewed and noted below.   Hypertension Medications               furosemide (LASIX) 80 MG tablet Take 1 tablet (80 mg total) by mouth 2 (two) times a day.    olmesartan (BENICAR) 5 MG Tab Take 5 mg by mouth once daily.    nitroGLYCERIN (NITROSTAT) 0.4 MG SL tablet Place 0.4 mg under the tongue every 5 (five) minutes as needed for Chest pain.             Hold b/p meds 2/2 permissive HTN.    Will utilize p.r.n. blood pressure medication as needed.    Mycobacterium avium complex  -chronic   -she is followed by Dr. Maki outpatient  -will cont duo nebs as needed      ABPA (allergic bronchopulmonary aspergillosis)  -noted in the patients history  -cont duo nebs  -monitor      Major depression, recurrent, chronic  Patient has persistent depression which is unknown and is currently uncontrolled.She is not prescribed anti-depressant medications outpatient. We will not consult psychiatry at this time. Patient does not display psychosis at this time. Continue to monitor closely and adjust plan of care as needed.        Chronic combined systolic and diastolic heart failure  -TTE from 3/1/24:    Left Ventricle: The left ventricle is normal in size. There is concentric remodeling. Septal motion is consistent with pacing. There is reduced systolic function. Biplane (2D) method of discs ejection  fraction is 52%.    Right Ventricle: Normal right ventricular cavity size. Systolic function is borderline low. TAPSE is 1.68 cm. Pacemaker lead present in the ventricle.    Left Atrium: Left atrium is moderately dilated.    Right Atrium: Lead present in the right atrium.    Aortic Valve: There is mild aortic valve sclerosis. Mildly restricted motion. There is mild stenosis. Aortic valve area by VTI is 1.55 cm². Aortic valve peak velocity is 1.45 m/s. Mean gradient is 5 mmHg. The dimensionless index is 0.57.    Mitral Valve: There is moderate regurgitation.    Tricuspid Valve: There is moderate regurgitation.    Pulmonary Artery: The estimated pulmonary artery systolic pressure is 69 mmHg.    IVC/SVC: Intermediate venous pressure at 8 mmHg.    Pericardium: Left pleural effusion.    -was given a 1 time dose of lasix on yesterday evening with good response  -PD per nephrology   -TTE ordered      Cardiomyopathy, ischemic  - echo 3/1/2024 with EF 52% and mild AS; history of NICM since 2004 with AICD in place   - monitor       COPD (chronic obstructive pulmonary disease)  Patient's COPD is controlled currently.  Patient is currently off COPD Pathway. Continue scheduled inhalers Supplemental oxygen and monitor respiratory status closely.     Iron deficiency anemia  Patient's anemia is currently controlled. Has not received any PRBCs to date. Etiology likely d/t chronic disease due to ESRD  Current CBC reviewed-   Lab Results   Component Value Date    HGB 9.7 (L) 04/22/2024    HCT 30.8 (L) 04/22/2024     Monitor serial CBC and transfuse if patient becomes hemodynamically unstable, symptomatic or H/H drops below 7/21.    Biventricular ICD (implantable cardioverter-defibrillator) in place  -noted        VTE Risk Mitigation (From admission, onward)           Ordered     heparin (porcine) injection 5,000 Units  Every 8 hours         04/20/24 1056     IP VTE HIGH RISK PATIENT  Once         04/20/24 1056     Place sequential  compression device  Until discontinued         04/20/24 1056                    Discharge Planning   ALIX:      Code Status: DNR   Is the patient medically ready for discharge?:     Reason for patient still in hospital (select all that apply): Laboratory test, Treatment, Imaging, and Consult recommendations                     Brayan Frye NP  Department of [facility] Medicine   [facility]

## 2024-04-22 NOTE — ASSESSMENT & PLAN NOTE
Chronic, uncontrolled. Latest blood pressure and vitals reviewed-     Temp:  [97.1 °F (36.2 °C)-98.2 °F (36.8 °C)]   Pulse:  []   Resp:  [14-18]   BP: (133-192)/(56-90)   SpO2:  [96 %-100 %] .   Home meds for hypertension were reviewed and noted below.   Hypertension Medications               furosemide (LASIX) 80 MG tablet Take 1 tablet (80 mg total) by mouth 2 (two) times a day.    olmesartan (BENICAR) 5 MG Tab Take 5 mg by mouth once daily.    nitroGLYCERIN (NITROSTAT) 0.4 MG SL tablet Place 0.4 mg under the tongue every 5 (five) minutes as needed for Chest pain.             Hold b/p meds 2/2 permissive HTN.    Will utilize p.r.n. blood pressure medication as needed.

## 2024-04-22 NOTE — ASSESSMENT & PLAN NOTE
Comfort measures were ordered but now on hold  [facility] consulted  over the weekend  Palliative med consulted--case bharathi Storey

## 2024-04-22 NOTE — SUBJECTIVE & OBJECTIVE
"Interval History: seen at the bedside, family present. Mental status has not changed, patient is extremely agitated and disorientated.     Family agrees on DNR status.  Per family: family has stated that the patient has been consistent with her wishes in the past. Recently the patient has been discussing wishing to "die and go be with her " after her upcomming birthday Tuesday. She has had a lot of difficulty PD daily and she has been telling her family that she wished she had not started it. We discussed reasonable goals of care.     Palliative Medicine has been consulted.  Family has asked to hold comfort measure order---will resume blood draws as well as other nursing orders.  Speech therapy was reconsulted---diet has been ordered.  We will follow.  .    Review of Systems   Unable to perform ROS: Acuity of condition     Objective:     Vital Signs (Most Recent):  Temp: 97.4 °F (36.3 °C) (04/22/24 0734)  Pulse: 75 (04/22/24 0813)  Resp: 18 (04/22/24 0813)  BP: (!) 179/90 (04/22/24 0734)  SpO2: 99 % (04/22/24 0813) Vital Signs (24h Range):  Temp:  [97.1 °F (36.2 °C)-98.2 °F (36.8 °C)] 97.4 °F (36.3 °C)  Pulse:  [] 75  Resp:  [14-18] 18  SpO2:  [96 %-100 %] 99 %  BP: (133-192)/(56-90) 179/90     Weight: 54.5 kg (120 lb 2.4 oz)  Body mass index is 21.28 kg/m².     Physical Exam  Vitals and nursing note reviewed.   Constitutional:       Appearance: She is ill-appearing.      Comments: Frail elderly female   HENT:      Head: Normocephalic and atraumatic.      Nose: Nose normal.      Mouth/Throat:      Mouth: Mucous membranes are moist.   Eyes:      Extraocular Movements: Extraocular movements intact.   Cardiovascular:      Rate and Rhythm: Normal rate and regular rhythm.      Pulses: Normal pulses.      Heart sounds: Normal heart sounds.   Abdominal:      General: There is no distension.      Tenderness: There is no abdominal tenderness. There is no guarding.      Comments: +PD catheter   Musculoskeletal: "      Cervical back: Normal range of motion and neck supple.   Skin:     General: Skin is warm and dry.      Capillary Refill: Capillary refill takes 2 to 3 seconds.      Findings: Bruising and rash present.   Neurological:      Mental Status: She is disoriented.      Motor: Weakness present.                Significant Labs: All pertinent labs within the past 24 hours have been reviewed.    Significant Imaging: I have reviewed all pertinent imaging results/findings within the past 24 hours.

## 2024-04-22 NOTE — PT/OT/SLP PROGRESS
"Speech Language Pathology Treatment    Patient Name:  Myesha Quinones   MRN:  [id number]  Admitting Diagnosis: Sepsis    Recommendations:                 Diet recommendations:  Level 5 minced/moist soft diet with THIN liquids   Aspiration Precautions: upright for meals, assist with feeding,encourage po, slow rate and crush meds as needed   General Precautions: Standard, fall,  (hard of hearing)  Communication strategies:  none    Assessment:     Myesha Quinones is a 83 y.o. female admitted with sepsis who presents with ability to start an oral diet.     Subjective     Pt seen for repeat swallow study, son present.   Patient goals: "I want water."     Pain/Comfort:  Pain Rating 1: 0/10    Respiratory Status: NC    Objective:     Has the patient been evaluated by SLP for swallowing?   Yes  Keep patient NPO? No       Swallowing: Pt found in room, she is awake, son at bedside and reports that she has been asking for water to drink. Pt was oriented to self. Pt attempting to hold cup and was able to bring to mouth. Pt tolerated 4 oz of water by straw, pudding bites fed by SLP and pieces of cracker with some increased masticatory time noted. No audible airway threat signs noted with PO trials. Pt safe for upgrade to mechanical soft consistency/thin liquids + boost for added caloric support. Son agreeable and pleased that pt can start  diet. Secure chat sent to medical team.       Goals:   Multidisciplinary Problems       SLP Goals       Not on file              Multidisciplinary Problems (Resolved)          Problem: SLP    Goal Priority Disciplines Outcome   SLP Goal   (Resolved)     SLP Met   Description: Goals:  1. Patient will successfully participate in budswn-aiuuzdhx-fhottwseq evaluation to further assess for any communication impairments.  2. Patient will successfully participate in clinical swallow evaluation and tolerate po trials with no overt s/s of aspiration.                        Plan:       Plan of " Care expires:  05/21/24  Plan of Care reviewed with:  patient, son   SLP Follow-Up:  No       Discharge recommendations:   (Palliative following)   Barriers to Discharge:  none    Time Tracking:     SLP Treatment Date:   04/22/24  Speech Start Time:  0933  Speech Stop Time:  0958     Speech Total Time (min):  25 min    Billable Minutes: Treatment Swallowing Dysfunction 15 and Self Care/Home Management Training 10    04/22/2024

## 2024-04-22 NOTE — ASSESSMENT & PLAN NOTE
Patient's FSGs are uncontrolled due to hyperglycemia on current medication regimen.  Last A1c reviewed-   Lab Results   Component Value Date    HGBA1C 6.0 (H) 04/20/2024     Most recent fingerstick glucose reviewed-   Recent Labs   Lab 04/21/24  1641 04/21/24  2101 04/22/24  0021 04/22/24  0606   POCTGLUCOSE 173* 202* 201* 175*         Maintain anti-hyperglycemic dose as follows-   Antihyperglycemics (From admission, onward)    Start     Stop Route Frequency Ordered    04/21/24 0011  insulin aspart U-100 pen 0-5 Units         -- SubQ Every 6 hours PRN 04/20/24 2311        Hold Oral hypoglycemics while patient is in the hospital.  Holding insulins as she is NPO.

## 2024-04-22 NOTE — ASSESSMENT & PLAN NOTE
Patient's anemia is currently controlled. Has not received any PRBCs to date. Etiology likely d/t chronic disease due to ESRD  Current CBC reviewed-   Lab Results   Component Value Date    HGB 9.7 (L) 04/22/2024    HCT 30.8 (L) 04/22/2024     Monitor serial CBC and transfuse if patient becomes hemodynamically unstable, symptomatic or H/H drops below 7/21.

## 2024-04-22 NOTE — ASSESSMENT & PLAN NOTE
"This patient does have evidence of infective focus  My overall impression is sepsis.  Source: Respiratory  Antibiotics given-   Antibiotics (72h ago, onward)    Start     Stop Route Frequency Ordered    04/20/24 1645  piperacillin-tazobactam (ZOSYN) 4.5 g in dextrose 5 % in water (D5W) 100 mL IVPB (MB+)         -- IV Every 12 hours (non-standard times) 04/20/24 1541    04/20/24 1641  vancomycin - pharmacy to dose  (vancomycin IVPB (PEDS and ADULTS))        Placed in "And" Linked Group    -- IV pharmacy to manage frequency 04/20/24 1541        Latest lactate reviewed-  Recent Labs   Lab 04/20/24  1815   LACTATE 1.1       Organ dysfunction indicated by Encephalopathy    Fluid challenge Not needed - patient is not hypotensive      Post- resuscitation assessment No - Post resuscitation assessment not needed       Will Not start Pressors- Levophed for MAP of 65  Source control achieved by: IV abx  "

## 2024-04-22 NOTE — PLAN OF CARE
[facility] - Telemetry  Initial Discharge Assessment       Primary Care Provider: Dayton Michael MD    Admission Diagnosis: Weakness [R53.1]  Stroke [I63.9]  Acute focal neurological deficit [R29.818]  Chest pain [R07.9]    Admission Date: 4/20/2024  Expected Discharge Date:     Pt oriented. DCA done at bedside with patient. Demographics/Ins/NextofKin/PCP verified with patient/family and updated as needed. Denies any DME needs at present from pt/family. Patient/family plans to return home with family. Educated on bedside RX. Patient consents for TN to discuss discharge plan with next of kin. Preferences appointment times and location obtained. Patient reports they will have transportation home upon discharge.    Noted Palliative consult done. Plans to DC with home hospice at [place]. [facility]. Pending medical stability. Does NOT have home O2.    Transition of Care Barriers: (P) None    Payor: [employer] MEDICARE / Plan: [employer] MEDICARE HMO / Product Type: Capitation /     Extended Emergency Contact Information  Primary Emergency Contact: Betsy Bowen  Address: [address] [place] [address] [facility]  Home Phone: [phone]  Mobile Phone: [phone]  Relation: Daughter  Secondary Emergency Contact: Rigo Quinones  Mobile Phone: [phone]  Relation: Son    Discharge Plan A: (P) Hospice/home         [id number] - [place] - [address] AT [facility] [address]  [address]  [place] [address]  Phone: [phone] Fax: [phone]    [id number] - [place] - [address] AT [facility]  [address]  [place] [address]  Phone: [phone] Fax: [phone]    [id number] - [place] - [address] AT [facility]  [address]  [place] [address]  Phone: [phone] Fax: [phone]      Initial Assessment (most recent)        Adult Discharge Assessment - 04/22/24 1330          Discharge Assessment    Assessment Type Discharge Planning Assessment (P)      Confirmed/corrected address, phone number and insurance Yes (P)      Confirmed Demographics Correct on Facesheet (P)      Source of Information family (P)      Communicated ALIX with patient/caregiver Yes (P)      People in Home child(barney), adult (P)      Do you expect to return to your current living situation? Yes (P)      Prior to hospitilization cognitive status: Alert/Oriented;No Deficits (P)      Current cognitive status: Alert/Oriented;No Deficits (P)      Walking or Climbing Stairs Difficulty no (P)      Dressing/Bathing Difficulty no (P)      Equipment Currently Used at Home other (see comments);CPAP;rollator (P)    PD supplies    Readmission within 30 days? No (P)      Patient currently being followed by outpatient case management? No (P)      Do you currently have service(s) that help you manage your care at home? No (P)      Do you take prescription medications? Yes (P)      Do you have any problems affording any of your prescribed medications? No (P)      Is the patient taking medications as prescribed? yes (P)      How do you get to doctors appointments? family or friend will provide (P)      Are you on dialysis? Yes (P)      Dialysis Name and Scheduled days PD with [facility] (P)      Discharge Plan A Hospice/home (P)      DME Needed Upon Discharge  other (see comments) (P)      Discharge Plan discussed with: Patient;Adult children (P)      Transition of Care Barriers None (P)                    Future Appointments   Date Time Provider Department Center   5/7/2024 10:30 AM Carla Garces AU.D [facility]   5/7/2024 11:00 AM Carla Garces AU.D [facility]   5/9/2024  1:00 PM De Mitchell,  [facility]   5/23/2024 10:10 AM CARRIE Arechiga MD [facility] OPHTHA [place]   6/5/2024 12:15 PM EKG, APPT [facility] E[facility] [address]   6/5/2024 12:40  PM COORDINATED DEVICE CHECK [facility] ARRHPRO [address]   6/5/2024  1:30 PM Celeste Rogers NP [facility] ARRHYTH [facility]   6/26/2024  9:30 AM Eric Brooke PA-C [facility] UROGYN [facility]   7/1/2024  3:30 PM Mary Campuzano MD [facility] ENDOCR [place]   9/10/2024 11:15 AM [facility] CT1 LIMIT 450 LBS [facility] CT SCAN [facility]   9/16/2024  2:00 PM Kelvin Maki MD [facility] PULMSVC [facility]         No orders of the defined types were placed in this encounter.

## 2024-04-22 NOTE — ASSESSMENT & PLAN NOTE
-pleural fluid again removed while admitted back in March of 2024--studies were exudative with negative cytology.    -has heart failure as well as renal failure--on PD  -CXR on admission today (4/20)-Monitoring leads overlie the chest.  Left subclavian approach AICD. Grossly unchanged cardiomediastinal contours, again noting enlargement the cardiac silhouette and prominence of central pulmonary vasculature. Interstitial alveolar opacities have progressed since 03/12/2024 examination.  Persistent dense retrocardiac and left mid lower lung zone opacity.  More focal nodular opacity measuring on the order of 30 mm is noted in the left lateral hemithorax.  Small right and at least moderate left pleural effusions are noted. No definite pneumothorax. No acute findings in the visualized abdomen.  Vascular calcifications postoperative sequela project in the upper abdomen. Osseous and soft tissue structures appear without definite acute change.   -CT chest pending   unable to perform

## 2024-04-22 NOTE — NURSING
Peritoneal dialysis set up prior to family and patient decision to stop dialysis.  Set up and cancelled.

## 2024-04-22 NOTE — PROGRESS NOTES
Nephrology Progress  Note       Consult Requested By: Geri Beltrán MD  Reason for Consult: ESRD on PD      SUBJECTIVE:            Review of Systems   Constitutional:  Negative for chills, fever and malaise/fatigue.   HENT:  Negative for congestion and sore throat.    Eyes:  Negative for blurred vision, double vision and photophobia.   Respiratory:  Negative for cough and shortness of breath.    Cardiovascular:  Negative for chest pain, palpitations and leg swelling.   Gastrointestinal:  Negative for abdominal pain, diarrhea, nausea and vomiting.   Genitourinary:  Negative for dysuria and urgency.   Musculoskeletal:  Negative for joint pain and myalgias.   Skin:  Negative for itching and rash.   Neurological:  Positive for weakness. Negative for dizziness, sensory change and headaches.   Endo/Heme/Allergies:  Negative for polydipsia. Does not bruise/bleed easily.   Psychiatric/Behavioral:  Negative for depression.        Past Medical History:   Diagnosis Date    Acute encephalopathy 02/29/2024    Acute hypoxemic respiratory failure 12/19/2019    Acute right-sided thoracic back pain 12/09/2019    Allergy     Asthma     Basal cell carcinoma     left forehead    Basal cell carcinoma     left nose    Basal cell carcinoma 05/20/2015    right nose    Basal cell carcinoma 12/22/2015    left lower post neck    Basal cell carcinoma 12/03/2019    left ant scalp     BCC (basal cell carcinoma of skin) 10/20/2022    Right alar groove    Bilateral pleural effusion     Bilateral renal cysts     Breast cancer     CAD (coronary artery disease)     Cardiomyopathy     Cardiomyopathy, ischemic     Cataract     Chest pain 03/04/2024    CHF (congestive heart failure)     CKD (chronic kidney disease) stage 4, GFR 15-29 ml/min     Colon polyp 2011    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, with long-term current use of insulin 02/22/2018    COPD (chronic  obstructive pulmonary disease)     COPD exacerbation 04/08/2018    Current mild episode of major depressive disorder without prior episode 01/25/2022    Defibrillator discharge     Dependence on renal dialysis 08/22/2023    Diabetes mellitus     Diabetes mellitus type II     Diabetes with neurologic complications     Edema     ESRD needing dialysis     Goals of care, counseling/discussion 07/08/2022    Goiter     MNG    Hematuria, unspecified     HX: breast cancer     Hyperlipidemia     Hypertension     Hypocalcemia     Hypokalemia     Hyponatremia     Hyponatremia 04/02/2022    Iron deficiency anemia 05/16/2017    Iron deficiency anemia     Iron deficiency anemia     Left kidney mass     Meningioma     Microalbuminuria due to type 2 diabetes mellitus 01/26/2022    Osteoporosis, postmenopausal     Pneumonia 12/08/2019    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    Proteinuria 01/21/2019    Pseudomonas pneumonia     SCC (squamous cell carcinoma) 08/25/2022    right posterior neck    Skin cancer     s/p excision    Sleep apnea     CPAP    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/2015    mid forehead    Unspecified vitamin D deficiency     UTI (urinary tract infection) 04/02/2022    Ventricular tachycardia     Vitamin B12 deficiency     Vitamin D deficiency disease          OBJECTIVE:     Vital Signs (Most Recent)  Vitals:    04/22/24 0425 04/22/24 0540 04/22/24 0734 04/22/24 0813   BP:  (!) 163/77 (!) 179/90    BP Location:  Left arm Left arm    Patient Position:  Lying Lying    Pulse:  87 72 75   Resp:  18 18 18   Temp:  98.1 °F (36.7 °C) 97.4 °F (36.3 °C)    TempSrc:  Oral Oral    SpO2: 96% 99% 99% 99%   Weight:  54.5 kg (120 lb 2.4 oz)     Height:                     Medications:  Current Facility-Administered Medications   Medication Dose Route Frequency    aspirin  81 mg Oral QHS    clopidogreL  300 mg Oral Once    clopidogreL  75 mg Oral Daily    furosemide (LASIX) injection  80 mg Intravenous BID    heparin  (porcine)  5,000 Units Subcutaneous Q8H    piperacillin-tazobactam (Zosyn) IV (PEDS and ADULTS) (extended infusion is not appropriate)  4.5 g Intravenous Q12H    potassium bicarbonate  25 mEq Oral Once    scopolamine  1 patch Transdermal Q3 Days    sevelamer carbonate  800 mg Oral TID WM    sodium bicarbonate  1,300 mg Oral QID           Physical Exam  Vitals and nursing note reviewed.   Constitutional:       General: She is not in acute distress.     Appearance: She is not diaphoretic.      Comments: Frail    HENT:      Head: Normocephalic and atraumatic.      Mouth/Throat:      Pharynx: No oropharyngeal exudate.   Eyes:      General: No scleral icterus.     Conjunctiva/sclera: Conjunctivae normal.      Pupils: Pupils are equal, round, and reactive to light.   Cardiovascular:      Rate and Rhythm: Normal rate and regular rhythm.      Heart sounds: Normal heart sounds. No murmur heard.  Pulmonary:      Effort: Pulmonary effort is normal. No respiratory distress.      Breath sounds: Examination of the left-lower field reveals rales. Rales present.   Abdominal:      General: Bowel sounds are normal. There is no distension.      Palpations: Abdomen is soft.      Tenderness: There is no abdominal tenderness.      Comments: PD catheter site no drainage no TTP    Musculoskeletal:         General: Normal range of motion.      Cervical back: Normal range of motion and neck supple.   Skin:     General: Skin is warm and dry.      Findings: No erythema.   Neurological:      Mental Status: She is alert and oriented to person, place, and time.      Cranial Nerves: No cranial nerve deficit.      Motor: Weakness present.   Psychiatric:         Mood and Affect: Affect normal.         Cognition and Memory: Memory normal.         Judgment: Judgment normal.         Laboratory:  Recent Labs   Lab 04/20/24  0839 04/21/24  0655 04/22/24  0339   WBC 10.23 11.95  11.95 12.13   HGB 11.1* 11.6*  11.6* 9.7*   HCT 34.8* 36.9*  36.9* 30.8*     276  276 209   MONO 5.5  0.6 8.7  8.7  1.0  1.0 6.3  0.8   EOSINOPHIL 3.2 0.0  0.0 0.0     Recent Labs   Lab 04/20/24  0839 04/21/24  0655 04/22/24  0339    139  139 142   K 4.3 4.0  4.0 3.2*   CL 99 102  102 105   CO2 21* 19*  19* 22*   BUN 67* 84*  84* 91*   CREATININE 3.5* 4.4*  4.4* 4.9*   CALCIUM 8.2* 7.8*  7.8* 7.1*   PHOS 4.1 5.8*  5.8*  --        Diagnostic Results:  X-Ray: Reviewed  US: Reviewed  Echo: Reviewed  ASSESSMENT/PLAN:     1. ESRD on PD  follows with Dr Borrero   -- Pending palliative consult - due to clinical decline and debility   -- for now continue PD nightly 3 cyscles 2L 8hr     2. HTN   -   Lasix 80 BID   BP labile   3. Anemia of chronic kidney disease treated with MIGUEL   EPogen    monthly   Recent Labs   Lab 04/20/24  0839 04/21/24  0655 04/22/24  0339   HGB 11.1* 11.6*  11.6* 9.7*   HCT 34.8* 36.9*  36.9* 30.8*    276  276 209       Iron   Lab Results   Component Value Date    IRON 124 05/09/2023    TIBC 355 05/09/2023    FERRITIN 639 (H) 05/19/2023       4. MBD  - Hypocalcemia   --  PTH vit D   -- control phos      Recent Labs   Lab 04/21/24  0655 04/22/24  0339   CALCIUM 7.8*  7.8* 7.1*   PHOS 5.8*  5.8*  --    -- Sevelamer 800 TIDWM   Recent Labs   Lab 04/20/24  0839 04/21/24  0655 04/22/24  0339   MG 1.8 1.9 1.9       Lab Results   Component Value Date    .6 (H) 03/04/2024    CALCIUM 7.1 (L) 04/22/2024    CAION 0.77 (L) 03/04/2024    PHOS 5.8 (H) 04/21/2024    PHOS 5.8 (H) 04/21/2024     Lab Results   Component Value Date    [id number] 23 (L) 03/01/2024     Acidosis   -- PO Bicarbonate 1300 TID     Lab Results   Component Value Date    CO2 22 (L) 04/22/2024       5. Nutrition/Hypoalbuminemia    Recent Labs   Lab 04/20/24  0839 04/21/24  0655   LABPROT 11.6  --    ALBUMIN 2.4* 2.5*  2.5*     Nepro with meals TID.       Thank you for allowing me to participate in care of your patient  With any question please call   Anna Fairbanks MD      [facility]  ESTRELLA Morocho MD,   MD TAMIKA Isabel MD E. V. Harmon, NP  [id number]   [place], [address] [phone]  After hours answering service: [phone]

## 2024-04-22 NOTE — PLAN OF CARE
Problem: Adult Inpatient Plan of Care  Goal: Plan of Care Review  Outcome: Ongoing, Progressing     Problem: Cognitive Impairment (Stroke, Ischemic/Transient Ischemic Attack)  Goal: Optimal Cognitive Function  Outcome: Ongoing, Progressing     Problem: Communication Impairment (Stroke, Ischemic/Transient Ischemic Attack)  Goal: Improved Communication Skills  Outcome: Ongoing, Progressing     Problem: Urinary Elimination Impaired (Stroke, Ischemic/Transient Ischemic Attack)  Goal: Effective Urinary Elimination  Outcome: Ongoing, Progressing     Problem: Fall Injury Risk  Goal: Absence of Fall and Fall-Related Injury  Outcome: Ongoing, Progressing

## 2024-04-22 NOTE — PROGRESS NOTES
Pharmacokinetic Assessment Follow Up: IV Vancomycin    Vancomycin serum concentration assessment(s):    The random level was drawn correctly and can be used to guide therapy at this time. The measurement is below the desired definitive target range of 15 to 20 mcg/mL.    Vancomycin Regimen Plan:  Vancomycin 750mg IV x1  Re-dose when the random level is less than 20 mcg/mL, next level to be drawn at 0400 on 4/25    Drug levels (last 3 results):  Recent Labs   Lab Result Units 04/21/24  2134   Vancomycin, Random ug/mL 14.3       Pharmacy will continue to follow and monitor vancomycin.    Please contact pharmacy at extension [phone] for questions regarding this assessment.    Thank you for the consult,   Gino Gray       Patient brief summary:  Myesha Quinones is a 83 y.o. female initiated on antimicrobial therapy with IV Vancomycin for treatment of sepsis    The patient's current regimen is     Drug Allergies:   Review of patient's allergies indicates:   Allergen Reactions    Iodine and iodide containing products Hives and Other (See Comments)     Not specified     Nifedipine      weakness       Actual Body Weight:       Renal Function:   Estimated Creatinine Clearance: 7.1 mL/min (A) (based on SCr of 4.9 mg/dL (H)).,     Dialysis Method (if applicable):  peritoneal dialysis    CBC (last 72 hours):  Recent Labs   Lab Result Units 04/20/24  0839 04/20/24  1221 04/21/24  0655 04/22/24  0339   WBC K/uL 10.23  --  11.95  11.95 12.13   Hemoglobin g/dL 11.1*  --  11.6*  11.6* 9.7*   Hemoglobin A1C %  --  6.0*  --   --    Hematocrit % 34.8*  --  36.9*  36.9* 30.8*   Platelets K/uL 248  --  276  276 209   Gran % % 79.3*  --  77.9*  77.9* 80.9*   Lymph % % 10.6*  --  12.4*  12.4* 11.8*   Mono % % 5.5  --  8.7  8.7 6.3   Eosinophil % % 3.2  --  0.0  0.0 0.0   Basophil % % 0.7  --  0.3  0.3 0.4   Differential Method  Automated  --  Automated  Automated Automated       Metabolic Panel (last 72 hours):  Recent Labs    Lab Result Units 04/20/24  0839 04/20/24  0949 04/21/24  0655 04/22/24  0339 04/22/24  1014   Sodium mmol/L 137  --  139  139 142  --    Potassium mmol/L 4.3  --  4.0  4.0 3.2*  --    Chloride mmol/L 99  --  102  102 105  --    CO2 mmol/L 21*  --  19*  19* 22*  --    Glucose mg/dL 194*  --  249*  249* 191*  --    Glucose, UA   --  Trace*  --   --  2+*   BUN mg/dL 67*  --  84*  84* 91*  --    Creatinine mg/dL 3.5*  --  4.4*  4.4* 4.9*  --    Albumin g/dL 2.4*  --  2.5*  2.5*  --   --    Total Bilirubin mg/dL 0.2  --  0.3  --   --    Alkaline Phosphatase U/L 166*  --  151*  --   --    AST U/L 17  --  20  --   --    ALT U/L 14  --  14  --   --    Magnesium mg/dL 1.8  --  1.9 1.9  --    Phosphorus mg/dL 4.1  --  5.8*  5.8*  --   --        Vancomycin Administrations:  vancomycin given in the last 96 hours                     vancomycin (VANCOCIN) 1,000 mg in dextrose 5 % (D5W) 250 mL IVPB (Vial-Mate) (mg) 1,000 mg New Bag 04/20/24 2251                    Microbiologic Results:  Microbiology Results (last 7 days)       Procedure Component Value Units Date/Time    Urine culture [[id number]] Collected: 04/22/24 1014    Order Status: No result Specimen: Urine Updated: 04/22/24 1030    Aerobic culture [[id number]] Collected: 04/20/24 1239    Order Status: Completed Specimen: Peritoneal Fluid Updated: 04/22/24 0721     Aerobic Bacterial Culture No growth    Blood culture [[id number]] Collected: 04/20/24 1554    Order Status: Completed Specimen: Blood Updated: 04/21/24 2312     Blood Culture, Routine No Growth to date      No Growth to date    Blood culture [[id number]] Collected: 04/20/24 1554    Order Status: Completed Specimen: Blood from Peripheral, Antecubital, Left Updated: 04/21/24 2212     Blood Culture, Routine No Growth to date      No Growth to date    Gram stain [[id number]] Collected: 04/20/24 1239    Order Status: Completed Specimen: Peritoneal Fluid Updated: 04/20/24 2125     Gram Stain Result No WBC's       No organisms seen

## 2024-04-22 NOTE — CONSULTS
Consult Note  Palliative Care    Consult Requested By: Geri Beltrán MD  Reason for Consult: Goals of care    SUBJECTIVE:     History of Present Illness:  Disease Process: Cancer, Advanced Respiratory Disease, End Stage Renal Disease, Advanced Cardiac Disease    83F with extensive PMH including HTN, HLD, T2DM, CAD, CHF s/p ICD, COPD, multiple skin cancers (BCC, SCC), breast cancer, meningioma, and ESRD 2/2 chronic medico-renal dz managed via PD x approximately 9-10 months.    Pt had a recent admission from 3/4/24-3/11/24 when pt was found unresponsive due to Pseudomonas PNA with a parapneumonic effusion and symptoms improved with culture driven abx however pt was discharged with a new supplemental O2 requirement. PD frequency had been significantly increased from biweekly -> daily due to involuntary muscle spasms attributed to uremia. Pt was promptly readmitted on 3/12/24 for orthostatic syncope, kept on lasix while holding BB and ARB; discharged on 3/14/24 with antiHTN meds resumed.    Pt still has significant UOP and was retaining urine that stay, discharged with [facility] and uro-gyn. Pessary was replaced on 3/21/24 and pt passed her voiding trial.    Chief Complaint Leading to Admission    Pt was BIB family on 4/20/24 due to AM drowsiness, stroke code was called and negative. Labs unrevealing. Urine clear. CXR showed fluid overload unable to fully r/o PNA. Admitted to  service.     Interval Hospital Course    4/20: Received Ativan overnight for sundowning, later put in soft restraints.    4/21: Unable to participate with PT due to somnolence and disorientation. Family related to primary team that pt has been having increasing difficulty with PD, regrets starting it, and wants to pass away peacefully and reunite with her  in [facility] after her birthday (4/23/24; which is tomorrow at time of this initial contact).    [facility] was consulted by the primary team however family initially declined  "hospice, declined CPAP, requested de-escalation of sedating meds.    Palliative has been consulted for goals of care.    At time of writing pt has recently been seen by SLP and cleared for soft diet with thin liquids. She is fatigued with slowed mentation but cognitively age appropriate. She states she was hospitalized because she was "just not doing very well" and agrees she feels worse after each PD session.    Family reports that pt had been clinically stable until the admission month ago after which pt was moved to her daughter Betsy's house but has been persistently disoriented and avolitional with declining PO intake leading to this admission.    Discussed with family that pt's progression to ESRD was gradual and ultimately appears secondary to extensive cardiovascular disease among other factors which we cannot readily reverse including pt's advanced age. We have attempted maximally aggressive dialysis to alleviate uremia which was felt responsible for pt's cognitive decline however pt does not have the physiologic reserve to tolerate such aggressive mgmt and clinically pt is dry such that ongoing PD will worsen her physical and mental condition.    Pt and all family members present are in agreement that pt will forego further PD and return home under home hospice care.    Family agrees to a bedside meeting with [facility]'s triage RN and state that they will likely need tomorrow to prepare pt's living space but should be able to accept DME tomorrow and prepare pt for home discharge Wednesday 4/24.    No further PD while pt remains admitted please, per concrete pt and family wishes.    Past Medical History:   Diagnosis Date    Acute encephalopathy 02/29/2024    Acute hypoxemic respiratory failure 12/19/2019    Acute right-sided thoracic back pain 12/09/2019    Allergy     Asthma     Basal cell carcinoma     left forehead    Basal cell carcinoma     left nose    Basal cell carcinoma 05/20/2015    right " nose    Basal cell carcinoma 12/22/2015    left lower post neck    Basal cell carcinoma 12/03/2019    left ant scalp     BCC (basal cell carcinoma of skin) 10/20/2022    Right alar groove    Bilateral pleural effusion     Bilateral renal cysts     Breast cancer     CAD (coronary artery disease)     Cardiomyopathy     Cardiomyopathy, ischemic     Cataract     Chest pain 03/04/2024    CHF (congestive heart failure)     CKD (chronic kidney disease) stage 4, GFR 15-29 ml/min     Colon polyp 2011    Controlled type 2 diabetes mellitus with both eyes affected by mild nonproliferative retinopathy and macular edema, with long-term current use of insulin 02/22/2018    COPD (chronic obstructive pulmonary disease)     COPD exacerbation 04/08/2018    Current mild episode of major depressive disorder without prior episode 01/25/2022    Defibrillator discharge     Dependence on renal dialysis 08/22/2023    Diabetes mellitus     Diabetes mellitus type II     Diabetes with neurologic complications     Edema     ESRD needing dialysis     Goals of care, counseling/discussion 07/08/2022    Goiter     MNG    Hematuria, unspecified     HX: breast cancer     Hyperlipidemia     Hypertension     Hypocalcemia     Hypokalemia     Hyponatremia     Hyponatremia 04/02/2022    Iron deficiency anemia 05/16/2017    Iron deficiency anemia     Iron deficiency anemia     Left kidney mass     Meningioma     Microalbuminuria due to type 2 diabetes mellitus 01/26/2022    Osteoporosis, postmenopausal     Pneumonia 12/08/2019    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    Proteinuria 01/21/2019    Pseudomonas pneumonia     SCC (squamous cell carcinoma) 08/25/2022    right posterior neck    Skin cancer     s/p excision    Sleep apnea     CPAP    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/2015    mid forehead    Unspecified vitamin D deficiency     UTI (urinary tract infection) 04/02/2022    Ventricular tachycardia     Vitamin B12 deficiency     Vitamin D  deficiency disease      Past Surgical History:   Procedure Laterality Date    BASAL CELL CARCINOMA EXCISION      posterior neck and nose    BREAST BIOPSY      BREAST CYST EXCISION Left     BREAST SURGERY      CARDIAC DEFIBRILLATOR PLACEMENT      x 2    CATARACT EXTRACTION W/  INTRAOCULAR LENS IMPLANT Bilateral     CHOLECYSTECTOMY      COLONOSCOPY N/A 11/5/2019    Procedure: COLONOSCOPY;  Surgeon: Boaz Botello MD;  Location: [facility] ENDO [address]);  Service: Endoscopy;  Laterality: N/A;  AICD - [employer]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    FRACTURE SURGERY      left elbow and wrist as a child    HYSTERECTOMY      INSERTION, CATHETER, DIALYSIS, PERITONEAL, LAPAROSCOPIC N/A 4/25/2023    Procedure: INSERTION, CATHETER, DIALYSIS, PERITONEAL, LAPAROSCOPIC;  Surgeon: Marcelo Isaac MD;  Location: [facility] OR;  Service: General;  Laterality: N/A;    LAPAROSCOPIC LYSIS OF ADHESIONS N/A 4/25/2023    Procedure: LYSIS, ADHESIONS, LAPAROSCOPIC;  Surgeon: Marcelo Isaac MD;  Location: [facility] OR;  Service: General;  Laterality: N/A;    MASTECTOMY Right     OMENTOPEXY, LAPAROSCOPIC N/A 4/25/2023    Procedure: OMENTOPEXY, LAPAROSCOPIC;  Surgeon: Marcelo Isaac MD;  Location: [facility] OR;  Service: General;  Laterality: N/A;    REPLACEMENT OF IMPLANTABLE CARDIOVERTER-DEFIBRILLATOR (ICD) GENERATOR N/A 12/17/2018    Procedure: REPLACEMENT, ICD GENERATOR;  Surgeon: Jan Mckeon MD;  Location: [facility] EP LAB;  Service: Cardiology;  Laterality: N/A;  DONNA, CRT-D gen change, MDT, MAC, SK, 3 Prep    REVISION OF SKIN POCKET FOR CARDIOVERTER-DEFIBRILLATOR  12/17/2018    Procedure: Revision, Skin Pocket, For Cardioverter-Defibrillator;  Surgeon: Jan Mckeon MD;  Location: [facility] EP LAB;  Service: Cardiology;;    SQUAMOUS CELL CARCINOMA EXCISION      remved from forehead    TONSILLECTOMY       Family History   Problem Relation Name Age of Onset    Asthma Mother      Hypertension Mother      Stroke Mother      Diabetes  Father      Cardiomyopathy Father      Diabetes Sister x1     Heart disease Sister x1     Heart attack Sister x1     Heart attack Brother Jarek     Diabetes Brother Jarek     Heart disease Brother Jarek     Hypertension Brother Jarek     Diabetes Brother Yee     Heart disease Brother Yee     Hypertension Brother Yee     Cerebral aneurysm Brother Yee     Diabetes Brother      Heart disease Brother      Cancer Brother          colon    Diabetes Brother      Diabetes Daughter x1         prediabetes    Cancer Daughter x1         melanoma    Obesity Daughter x1     Melanoma Daughter x1     Cancer Son          skin    Diabetes Son          prediabetes    Diabetes Son      No Known Problems Maternal Grandmother      No Known Problems Maternal Grandfather      No Known Problems Paternal Grandmother      No Known Problems Paternal Grandfather      Amblyopia Neg Hx      Blindness Neg Hx      Cataracts Neg Hx      Glaucoma Neg Hx      Macular degeneration Neg Hx      Retinal detachment Neg Hx      Strabismus Neg Hx      Thyroid disease Neg Hx       Social History     Tobacco Use    Smoking status: Never     Passive exposure: Never    Smokeless tobacco: Never   Substance Use Topics    Alcohol use: No     Alcohol/week: 0.0 standard drinks of alcohol    Drug use: No       Mental Status: Disoriented, Dementia    ECOG Performance Status Grade: 4 - Completely disabled    Review of Systems:  Positive for dyspepsia.    Review of Symptoms      Symptom Assessment (ESAS 0-10 Scale)  Pain:  0  Dyspnea:  0  Anxiety:  0  Nausea:  1  Depression:  0  Anorexia:  5  Fatigue:  8  Insomnia:  0  Restlessness:  0  Agitation:  0     CAM / Delirium:  Positive  Constipation:  Negative  Diarrhea:  Negative      ECOG Performance Status rdGrdrrdarddrderd:rd rd3rd Living Arrangements:  Lives in home and Lives with family    Psychosocial/Cultural:   See Palliative Psychosocial Note: No  Social Issues Identified: Coping deficit pt/family and Financial  Issues  Bereavement Risk: Yes: Close or dependent relationship to the  person and Social isolation or loss of a support system or friendships  Caregiver Needs Discussed. Caregiver Distress: Yes: Need for respite, Issues of guilt, and Intensity of family caregiving  Cultural: .  **Primary  to Follow**  Palliative Care  Consult: No    Spiritual:  F - Ester and Belief:  Sikhism  I - Importance:  Low  C - Community:  None active  A - Address in Care:  Sacramental care PRN     Time-Based Charting:  Yes  Chart Review: 28 minutes  Face to Face: 13 minutes  Symptom Assessment: 9 minutes  Coordination of Care: 16 minutes  Discharge Plannin minutes  Advance Care Planning: 15 minutes  Goals of Care: 40 minutes    Total Time Spent: 133 minutes      Advance Care Planning   Advance Directives:   Living Will: No        Oral Declaration: No    LaPOST: No    Do Not Resuscitate Status: Yes    Medical Power of : No    Agent's Name:  Betsy Bowen (daughter)   Agent's Contact Number:  [phone]    Decision Making:  Patient answered questions and Family answered questions  Goals of Care: What is most important right now is to focus on spending time at home, avoiding the hospital, remaining as independent as possible, symptom/pain control, quality of life, even if it means sacrificing a little time. Accordingly, we have decided that the best plan to meet the patient's goals includes enrolling in hospice care.         OBJECTIVE:     Physical Exam  Constitutional:       General: She is not in acute distress.     Appearance: She is ill-appearing. She is not toxic-appearing.   HENT:      Head: Normocephalic and atraumatic.      Comments: Temporal wasting     Mouth/Throat:      Mouth: Mucous membranes are dry.   Eyes:      Extraocular Movements: Extraocular movements intact.      Pupils: Pupils are equal, round, and reactive to light.   Cardiovascular:      Rate and Rhythm: Normal rate.       Pulses: Normal pulses.      Heart sounds: No murmur heard.  Pulmonary:      Effort: Pulmonary effort is normal.      Breath sounds: Normal breath sounds. No wheezing or rales.   Abdominal:      General: Abdomen is flat. There is no distension.      Palpations: Abdomen is soft.      Tenderness: There is no abdominal tenderness.   Musculoskeletal:         General: No swelling. Normal range of motion.   Skin:     Coloration: Skin is pale.      Findings: Bruising (ecchymoses throughout upper extremities) present.   Neurological:      Mental Status: She is alert. She is disoriented.      GCS: GCS eye subscore is 4. GCS verbal subscore is 5. GCS motor subscore is 6.      Motor: Weakness and atrophy present. No tremor, abnormal muscle tone or seizure activity.   Psychiatric:         Attention and Perception: Perception normal. She is inattentive.         Mood and Affect: Mood is depressed. Affect is flat.         Speech: Speech is delayed. Speech is not tangential.         Behavior: Behavior is slowed and withdrawn. Behavior is cooperative.         Cognition and Memory: Cognition is impaired. Memory is impaired.      Comments: Nods off frequently       ASSESSMENT/PLAN:     83F with extensive PMH including HTN, HLD, T2DM, CAD, CHF s/p ICD, COPD, multiple skin cancers (BCC, SCC), breast cancer, meningioma, and ESRD 2/2 chronic medico-renal dz managed via PD x approximately 9-10 months. Pt is in subacute decline following a sentinel admission one month PTA for [place] CAP and uremic encephalopathy for which pt's PD was escalated significantly (2x weekly -> daily) provoking chronic hypovolemia and orthostasis c/b bedbound status, anorexia and sundowning.    Pt is in terminal physical and cognitive decline but retains capacity to state she no longer wishes to under futile PD at end of life and family is in agreement. She is hospice qualified with present symptom burden most compatible with home hospice care. Family in  agreement with bringing pt home under hospice care.    Referral made to [facility]. Tentative discharge 4/24/24 per family request as they state they will need to rearrange furniture and bring food for the caretakers to pt's home tomorrow. Ideally DME can be delivered tomorrow 4/23/24 to avoid further delays.    Recommendations:  Medical: no further PD  Symptom Management:   # Dyspepsia  - added Pepcid renal dose 20mg daily  Psychosocial: reactive depression superimposed on moderate dementia  Legal: daughterBetsy is next of kin and acting surrogate  Prognosis: < 14 days for ESRD stopping PD and qualified to access hospice benefits    Oscar Storey MD  Hospice and Palliative Medicine  Palliative Care Pager: [phone]    Advance Care Planning     Date: 04/22/2024    Code Status  In light of the patients advanced and life limiting illness,I engaged the the family in a voluntary conversation about the patient's preferences for care  at the very end of life. The patient wishes to have a natural, peaceful death.  Along those lines, the patient does not wish to have CPR or other invasive treatments performed when her heart and/or breathing stops. I communicated to the family that a DNR order would be placed in her medical record to reflect this preference.  I spent a total of 55 minutes engaging the patient in this advance care planning discussion.       [facility]  I engaged the patient and family in a voluntary conversation about advance care planning and we specifically addressed what the goals of care would be moving forward, in light of the patient's change in clinical status, specifically ESRD not tolerating daily PD.  We did specifically address the patient's likely prognosis, which is poor.  We explored the patient's values and preferences for future care.  The family endorses that what is most important right now is to focus on spending time at home, avoiding the hospital, remaining as independent as  possible, symptom/pain control, and quality of life, even if it means sacrificing a little time    Accordingly, we have decided that the best plan to meet the patient's goals includes enrolling in hospice care    I did explain the role for hospice care at this stage of the patient's illness, including its ability to help the patient live with the best quality of life possible.  We will be making a hospice referral.    I spent a total of 55 minutes engaging the patient in this advance care planning discussion.

## 2024-04-22 NOTE — ASSESSMENT & PLAN NOTE
-TTE from 3/1/24:    Left Ventricle: The left ventricle is normal in size. There is concentric remodeling. Septal motion is consistent with pacing. There is reduced systolic function. Biplane (2D) method of discs ejection fraction is 52%.    Right Ventricle: Normal right ventricular cavity size. Systolic function is borderline low. TAPSE is 1.68 cm. Pacemaker lead present in the ventricle.    Left Atrium: Left atrium is moderately dilated.    Right Atrium: Lead present in the right atrium.    Aortic Valve: There is mild aortic valve sclerosis. Mildly restricted motion. There is mild stenosis. Aortic valve area by VTI is 1.55 cm². Aortic valve peak velocity is 1.45 m/s. Mean gradient is 5 mmHg. The dimensionless index is 0.57.    Mitral Valve: There is moderate regurgitation.    Tricuspid Valve: There is moderate regurgitation.    Pulmonary Artery: The estimated pulmonary artery systolic pressure is 69 mmHg.    IVC/SVC: Intermediate venous pressure at 8 mmHg.    Pericardium: Left pleural effusion.    -was given a 1 time dose of lasix on yesterday evening with good response  -PD per nephrology   -TTE ordered

## 2024-04-23 PROBLEM — I50.42 CHRONIC COMBINED SYSTOLIC AND DIASTOLIC HEART FAILURE: Status: RESOLVED | Noted: 2021-03-23 | Resolved: 2024-04-23

## 2024-04-23 PROBLEM — G93.40 ACUTE ENCEPHALOPATHY: Status: RESOLVED | Noted: 2024-03-06 | Resolved: 2024-04-23

## 2024-04-23 PROBLEM — R11.2 NAUSEA AND VOMITING: Status: RESOLVED | Noted: 2024-04-20 | Resolved: 2024-04-23

## 2024-04-23 LAB
BACTERIA UR CULT: NO GROWTH
POCT GLUCOSE: 115 MG/DL (ref 70–110)
POCT GLUCOSE: 216 MG/DL (ref 70–110)
POCT GLUCOSE: 305 MG/DL (ref 70–110)

## 2024-04-23 PROCEDURE — 51798 US URINE CAPACITY MEASURE: CPT

## 2024-04-23 PROCEDURE — 99900035 HC TECH TIME PER 15 MIN (STAT)

## 2024-04-23 PROCEDURE — 25000003 PHARM REV CODE 250: Performed by: NURSE PRACTITIONER

## 2024-04-23 PROCEDURE — 25000003 PHARM REV CODE 250: Performed by: INTERNAL MEDICINE

## 2024-04-23 PROCEDURE — 63600175 PHARM REV CODE 636 W HCPCS: Performed by: STUDENT IN AN ORGANIZED HEALTH CARE EDUCATION/TRAINING PROGRAM

## 2024-04-23 PROCEDURE — 63600175 PHARM REV CODE 636 W HCPCS: Performed by: NURSE PRACTITIONER

## 2024-04-23 PROCEDURE — 25000003 PHARM REV CODE 250: Performed by: STUDENT IN AN ORGANIZED HEALTH CARE EDUCATION/TRAINING PROGRAM

## 2024-04-23 PROCEDURE — 94761 N-INVAS EAR/PLS OXIMETRY MLT: CPT

## 2024-04-23 PROCEDURE — 51701 INSERT BLADDER CATHETER: CPT

## 2024-04-23 PROCEDURE — 11000001 HC ACUTE MED/SURG PRIVATE ROOM

## 2024-04-23 PROCEDURE — 25000003 PHARM REV CODE 250: Performed by: FAMILY MEDICINE

## 2024-04-23 RX ORDER — AMOXICILLIN AND CLAVULANATE POTASSIUM 500; 125 MG/1; MG/1
1 TABLET, FILM COATED ORAL DAILY
Qty: 5 TABLET | Refills: 0 | Status: SHIPPED | OUTPATIENT
Start: 2024-04-23 | End: 2024-04-28

## 2024-04-23 RX ORDER — ALUMINUM HYDROXIDE, MAGNESIUM HYDROXIDE, AND SIMETHICONE 1200; 120; 1200 MG/30ML; MG/30ML; MG/30ML
30 SUSPENSION ORAL EVERY 6 HOURS PRN
Status: DISCONTINUED | OUTPATIENT
Start: 2024-04-23 | End: 2024-04-24 | Stop reason: HOSPADM

## 2024-04-23 RX ORDER — AMOXICILLIN AND CLAVULANATE POTASSIUM 500; 125 MG/1; MG/1
1 TABLET, FILM COATED ORAL DAILY
Status: DISCONTINUED | OUTPATIENT
Start: 2024-04-23 | End: 2024-04-24 | Stop reason: HOSPADM

## 2024-04-23 RX ORDER — SODIUM BICARBONATE 650 MG/1
650 TABLET ORAL 3 TIMES DAILY
COMMUNITY
Start: 2024-04-23

## 2024-04-23 RX ORDER — LOSARTAN POTASSIUM 25 MG/1
25 TABLET ORAL DAILY
Status: DISCONTINUED | OUTPATIENT
Start: 2024-04-23 | End: 2024-04-24

## 2024-04-23 RX ORDER — FUROSEMIDE 40 MG/1
80 TABLET ORAL 2 TIMES DAILY
Status: DISCONTINUED | OUTPATIENT
Start: 2024-04-23 | End: 2024-04-24 | Stop reason: HOSPADM

## 2024-04-23 RX ADMIN — SODIUM BICARBONATE 1300 MG: 650 TABLET ORAL at 08:04

## 2024-04-23 RX ADMIN — ALUMINUM HYDROXIDE, MAGNESIUM HYDROXIDE, AND SIMETHICONE 30 ML: 200; 200; 20 SUSPENSION ORAL at 07:04

## 2024-04-23 RX ADMIN — SODIUM BICARBONATE 1300 MG: 650 TABLET ORAL at 04:04

## 2024-04-23 RX ADMIN — SCOPOLAMINE 1 PATCH: 1.5 PATCH, EXTENDED RELEASE TRANSDERMAL at 04:04

## 2024-04-23 RX ADMIN — HEPARIN SODIUM 5000 UNITS: 5000 INJECTION INTRAVENOUS; SUBCUTANEOUS at 05:04

## 2024-04-23 RX ADMIN — CLOPIDOGREL BISULFATE 75 MG: 75 TABLET ORAL at 08:04

## 2024-04-23 RX ADMIN — INSULIN ASPART 4 UNITS: 100 INJECTION, SOLUTION INTRAVENOUS; SUBCUTANEOUS at 11:04

## 2024-04-23 RX ADMIN — FUROSEMIDE 80 MG: 10 INJECTION, SOLUTION INTRAVENOUS at 08:04

## 2024-04-23 RX ADMIN — AMOXICILLIN AND CLAVULANATE POTASSIUM 500 MG: 500; 125 TABLET, FILM COATED ORAL at 02:04

## 2024-04-23 RX ADMIN — FUROSEMIDE 80 MG: 40 TABLET ORAL at 05:04

## 2024-04-23 RX ADMIN — SEVELAMER CARBONATE 0.8 G: 800 POWDER, FOR SUSPENSION ORAL at 04:04

## 2024-04-23 RX ADMIN — FAMOTIDINE 20 MG: 20 TABLET ORAL at 08:04

## 2024-04-23 RX ADMIN — SEVELAMER CARBONATE 0.8 G: 800 POWDER, FOR SUSPENSION ORAL at 08:04

## 2024-04-23 RX ADMIN — INSULIN DETEMIR 8 UNITS: 100 INJECTION, SOLUTION SUBCUTANEOUS at 10:04

## 2024-04-23 RX ADMIN — LOSARTAN POTASSIUM 25 MG: 25 TABLET, FILM COATED ORAL at 02:04

## 2024-04-23 RX ADMIN — SODIUM BICARBONATE 1300 MG: 650 TABLET ORAL at 12:04

## 2024-04-23 RX ADMIN — PIPERACILLIN AND TAZOBACTAM 4.5 G: 4; .5 INJECTION, POWDER, LYOPHILIZED, FOR SOLUTION INTRAVENOUS; PARENTERAL at 10:04

## 2024-04-23 NOTE — ASSESSMENT & PLAN NOTE
-TTE from 3/1/24:    Left Ventricle: The left ventricle is normal in size. There is concentric remodeling. Septal motion is consistent with pacing. There is reduced systolic function. Biplane (2D) method of discs ejection fraction is 52%.    Right Ventricle: Normal right ventricular cavity size. Systolic function is borderline low. TAPSE is 1.68 cm. Pacemaker lead present in the ventricle.    Left Atrium: Left atrium is moderately dilated.    Right Atrium: Lead present in the right atrium.    Aortic Valve: There is mild aortic valve sclerosis. Mildly restricted motion. There is mild stenosis. Aortic valve area by VTI is 1.55 cm². Aortic valve peak velocity is 1.45 m/s. Mean gradient is 5 mmHg. The dimensionless index is 0.57.    Mitral Valve: There is moderate regurgitation.    Tricuspid Valve: There is moderate regurgitation.    Pulmonary Artery: The estimated pulmonary artery systolic pressure is 69 mmHg.    IVC/SVC: Intermediate venous pressure at 8 mmHg.    Pericardium: Left pleural effusion.    -was given a 1 time dose of lasix on yesterday evening with good response  -PD per nephrology   -TTE ordered

## 2024-04-23 NOTE — PLAN OF CARE
04/23/24 1027   Post-Acute Status   Post-Acute Authorization Hospice   Hospice Status Pending equipment/medication delivery  (Orders faxed to [facility]. Assigned JASON Knutson RN to finalize DC for today.)

## 2024-04-23 NOTE — PROGRESS NOTES
[facility] Medicine  Progress Note    Patient Name: Myesha Quinones  MRN: [id number]  Patient Class: IP- Inpatient   Admission Date: 4/20/2024  Length of Stay: 2 days  Attending Physician: Gracia Espino MD  Primary Care Provider: Dayton Michael MD      Subjective:     Principal Problem:Urinary retention      HPI:  Myesha Quinones is a 83-year-old F with a pmh of ESRD on PD, type 2 diabetes mellitus, hypertension, obstructive airway disease, CAD, breast cancer, and hyperlipidemia. She was recently hospitalized from 3/4/204-3/11/2024 with complaints of fatigue, lethargy, periodic involuntary jerks--was  found to have Pseudomonas pneumonia with uncomplicated parapneumonic effusion. Underwent left thoracentesis during this admission, pleural fluid cultures were negative. Respiratory cultures grew pan-sensitive Pseudomonas. Patient was seen by pulmonology and recommended 4-6 weeks of antibiotics for Pseudomonas pneumonia. Patient's symptoms improved but required supplemental oxygen at discharge. Discharged on levofloxacin 250 mg daily, end date 03/31 for 4 weeks therapy. Frequency of PD was increased from twice a week to daily as uremia thought to be contributing to periodic involuntary jerks. Was also admitted 3/12/2024-3/14/2024 for suspected syncope from hypotension combined with acute on chronic anemia. Antihypertensives were held at that time and furosemide was resumed.     Patient presented to the ED with her son in law and daughter on this am with complaints of AMS. Per the daughter, Patient was seen well at about 9:30 last night as she was going to bed. She was found about 7:00 am this morning not responding. Daughter says she found her mother in bed with eyes open. She was unable to answer any questions. Daughter states at the patients baseline she is ambulatory, can complete her ADLs, and is oriented. Code stroke activated in the ED. Vascular neuro recommended head CT- No acute  intracranial pathology.  If concern persists for acute ischemic event, consider MRI brain for further evaluation if patient compatible. Sequela of chronic microvascular ischemic change. Stable mainly calcified extra-axial lesion along the left frontal convexity, presumed meningioma. CTA head and neck ordered but held per Neuro. Unable to perform MRI brain 2/2 ICD. [facility] neuro has been consulted. Nephrology was consulted for peritoneal dialysis. Cardiology was also consulted as her troponin has been elevated this admission--no c/o CP. EKG with SR with PACs.    Her ED workup also includes a stable hemoglobin 11.1, CO2-21, anion gap-17, Cr-3.5, glucose 194, troponin 0.064>0.097, lactate 2.5, TSH 0.997. CXR- Interstitial and alveolar opacities have progressed since the 03/12/2024 radiograph and may relate to edema, noting that infectious or noninfectious inflammatory etiology may peers similar. Left greater than right pleural effusions. Increased conspicuity of slightly nodular opacity at the left lateral mid lung zone which could relate to loculated pleural fluid. No infection noted on U/A. Will admit to the [facility] for further care.    Overview/Hospital Course:  No notes on file    Interval History: NAEON. No acute concerns. Nurse reports urinary retention requiring I/O cath x2. 1200 and 900 cc post cath voids.     Review of Systems   Constitutional:  Negative for chills and fever.   Respiratory:  Negative for shortness of breath.    Cardiovascular:  Negative for chest pain.   Gastrointestinal:  Negative for abdominal pain.   Genitourinary:  Negative for dysuria.   Neurological:  Negative for headaches.   Psychiatric/Behavioral:  Negative for confusion.      Objective:     Vital Signs (Most Recent):  Temp: 97.6 °F (36.4 °C) (04/23/24 1122)  Pulse: 94 (04/23/24 1158)  Resp: 18 (04/23/24 1158)  BP: (!) 190/84 (notified Dr Espino of elevated BP again, MD placing orders for pts home antihypertnesive) (04/23/24  1122)  SpO2: 98 % (04/23/24 1158) Vital Signs (24h Range):  Temp:  [97.3 °F (36.3 °C)-97.7 °F (36.5 °C)] 97.6 °F (36.4 °C)  Pulse:  [69-94] 94  Resp:  [16-18] 18  SpO2:  [96 %-100 %] 98 %  BP: (141-190)/(64-84) 190/84     Weight: 54.5 kg (120 lb 2.4 oz)  Body mass index is 21.28 kg/m².    Intake/Output Summary (Last 24 hours) at 4/23/2024 1424  Last data filed at 4/23/2024 0413  Gross per 24 hour   Intake 480 ml   Output 1200 ml   Net -720 ml         Physical Exam  Vitals and nursing note reviewed.   Constitutional:       General: She is not in acute distress.     Appearance: She is well-developed.   HENT:      Head: Normocephalic and atraumatic.   Eyes:      Conjunctiva/sclera: Conjunctivae normal.   Neck:      Vascular: No JVD.   Cardiovascular:      Rate and Rhythm: Normal rate and regular rhythm.      Heart sounds: Normal heart sounds.   Pulmonary:      Effort: Pulmonary effort is normal.      Breath sounds: Normal breath sounds.   Abdominal:      General: Bowel sounds are normal. There is no distension.      Palpations: Abdomen is soft.      Tenderness: There is no abdominal tenderness.   Musculoskeletal:      Cervical back: Neck supple.      Right lower leg: No edema.      Left lower leg: No edema.   Neurological:      Mental Status: She is alert.   Psychiatric:         Mood and Affect: Affect is flat.         Behavior: Behavior is withdrawn.             Significant Labs: All pertinent labs within the past 24 hours have been reviewed.    Significant Imaging:  none    Assessment/Plan:      * Urinary retention  I/O x2  If abnormal bladder scan again, RN to place lauren and will keep upon DC      Acute cystitis  cont abx      Comfort measures only status  Comfort measures were ordered but now on hold  [facility] consulted  over the weekend  Palliative med consulted--case dw Dr. LimaNary      Focal neurological deficit  Home with hospice      Severe persistent chronic asthma without complication  -Prasanth  prn  she is no longer on maintenance inhalers.      Pleural effusion on left  -continue lasix     Elevated troponin  -troponin 0.064>0.097 --trend flat  No further work up      ESRD on peritoneal dialysis  - no further PD treatment  Home with hospice tomorrow       ACP (advance care planning)  DNR    Type 2 diabetes mellitus, with long-term current use of insulin  Last A1c reviewed-   Lab Results   Component Value Date    HGBA1C 6.0 (H) 04/20/2024     Excellent disease control  No further SSI warranted at this time    Essential hypertension  Chronic, uncontrolled. Latest blood pressure and vitals reviewed-     Temp:  [97.3 °F (36.3 °C)-97.7 °F (36.5 °C)]   Pulse:  [69-94]   Resp:  [16-18]   BP: (141-190)/(64-84)   SpO2:  [96 %-100 %] .   Home meds for hypertension were reviewed and noted below.   Hypertension Medications               furosemide (LASIX) 80 MG tablet Take 1 tablet (80 mg total) by mouth 2 (two) times a day.    olmesartan (BENICAR) 5 MG Tab Take 5 mg by mouth once daily.    nitroGLYCERIN (NITROSTAT) 0.4 MG SL tablet Place 0.4 mg under the tongue every 5 (five) minutes as needed for Chest pain.           Resume BP all meds. Monitor.     Will utilize p.r.n. blood pressure medication as needed.    Mycobacterium avium complex  -chronic, stable       Sepsis  This patient does have evidence of infective focus  My overall impression is sepsis.  Source: Respiratory  Antibiotics given-   Antibiotics (72h ago, onward)      Start     Stop Route Frequency Ordered    04/23/24 1315  amoxicillin-clavulanate 500-125mg per tablet 500 mg         -- Oral Daily 04/23/24 1208          Treated. Transition to oral antibiotics today.    ABPA (allergic bronchopulmonary aspergillosis)  -noted in the patients history  -cont duo nebs  -monitor      Major depression, recurrent, chronic  Patient has persistent depression which is unknown and is currently uncontrolled.She is not prescribed anti-depressant medications outpatient. We  will not consult psychiatry at this time. Patient does not display psychosis at this time. Continue to monitor closely and adjust plan of care as needed.        Cardiomyopathy, ischemic  - echo 3/1/2024 with EF 52% and mild AS; history of NICM since 2004 with AICD in place   - stable  - monitor       COPD (chronic obstructive pulmonary disease)  Patient's COPD is controlled currently.  Patient is currently off COPD Pathway. Continue scheduled inhalers Supplemental oxygen and monitor respiratory status closely.     Iron deficiency anemia  Patient's anemia is currently controlled. Has not received any PRBCs to date. Etiology likely d/t chronic disease due to ESRD  Current CBC reviewed-   Lab Results   Component Value Date    HGB 9.7 (L) 04/22/2024    HCT 30.8 (L) 04/22/2024     Monitor serial CBC and transfuse if patient becomes hemodynamically unstable, symptomatic or H/H drops below 7/21.    Biventricular ICD (implantable cardioverter-defibrillator) in place  -noted        VTE Risk Mitigation (From admission, onward)           Ordered     IP VTE HIGH RISK PATIENT  Once         04/20/24 1056     Place sequential compression device  Until discontinued         04/20/24 1056                    Discharge Planning   ALIX: 4/24/2024     Code Status: DNR   Is the patient medically ready for discharge?:     Reason for patient still in hospital (select all that apply): Pending disposition- home hospice set up, but family requesting more time to get home ready and want transportation set up for tomorrow.   Discharge Plan A: Home, Home with family, Hospital at home   Discharge Delays: None known at this time      Gracia Espino MD  [facility]   [place] [facility]

## 2024-04-23 NOTE — PLAN OF CARE
Problem: Adult Inpatient Plan of Care  Goal: Plan of Care Review  Outcome: Progressing   VN note:   Patient's chart, labs and vital signs reviewed, will be available to intervene if needed.

## 2024-04-23 NOTE — NURSING
Bladder scan complete >977 in the bladder. In and out cath done 1200 taken out. Plan of care ongoing.

## 2024-04-23 NOTE — PROGRESS NOTES
Nephrology Progress  Note       Consult Requested By: Gracia Espino MD  Reason for Consult: ESRD on PD      SUBJECTIVE:            Review of Systems   Constitutional:  Negative for chills, fever and malaise/fatigue.   HENT:  Negative for congestion and sore throat.    Eyes:  Negative for blurred vision, double vision and photophobia.   Respiratory:  Negative for cough and shortness of breath.    Cardiovascular:  Negative for chest pain, palpitations and leg swelling.   Gastrointestinal:  Negative for abdominal pain, diarrhea, nausea and vomiting.   Genitourinary:  Negative for dysuria and urgency.   Musculoskeletal:  Negative for joint pain and myalgias.   Skin:  Negative for itching and rash.   Neurological:  Positive for weakness. Negative for dizziness, sensory change and headaches.   Endo/Heme/Allergies:  Negative for polydipsia. Does not bruise/bleed easily.   Psychiatric/Behavioral:  Negative for depression.        Past Medical History:   Diagnosis Date    Acute encephalopathy 02/29/2024    Acute hypoxemic respiratory failure 12/19/2019    Acute right-sided thoracic back pain 12/09/2019    Allergy     Asthma     Basal cell carcinoma     left forehead    Basal cell carcinoma     left nose    Basal cell carcinoma 05/20/2015    right nose    Basal cell carcinoma 12/22/2015    left lower post neck    Basal cell carcinoma 12/03/2019    left ant scalp     BCC (basal cell carcinoma of skin) 10/20/2022    Right alar groove    Bilateral pleural effusion     Bilateral renal cysts     Breast cancer     CAD (coronary artery disease)     Cardiomyopathy     Cardiomyopathy, ischemic     Cataract     Chest pain 03/04/2024    CHF (congestive heart failure)     CKD (chronic kidney disease) stage 4, GFR 15-29 ml/min     Colon polyp 2011    Controlled type 2 diabetes mellitus with both eyes affected by mild nonproliferative retinopathy and macular edema, with long-term current use of insulin 02/22/2018    COPD (chronic  obstructive pulmonary disease)     COPD exacerbation 04/08/2018    Current mild episode of major depressive disorder without prior episode 01/25/2022    Defibrillator discharge     Dependence on renal dialysis 08/22/2023    Diabetes mellitus     Diabetes mellitus type II     Diabetes with neurologic complications     Edema     ESRD needing dialysis     Goals of care, counseling/discussion 07/08/2022    Goiter     MNG    Hematuria, unspecified     HX: breast cancer     Hyperlipidemia     Hypertension     Hypocalcemia     Hypokalemia     Hyponatremia     Hyponatremia 04/02/2022    Iron deficiency anemia 05/16/2017    Iron deficiency anemia     Iron deficiency anemia     Left kidney mass     Meningioma     Microalbuminuria due to type 2 diabetes mellitus 01/26/2022    Osteoporosis, postmenopausal     Pneumonia 12/08/2019    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    Proteinuria 01/21/2019    Pseudomonas pneumonia     SCC (squamous cell carcinoma) 08/25/2022    right posterior neck    Skin cancer     s/p excision    Sleep apnea     CPAP    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/2015    mid forehead    Unspecified vitamin D deficiency     UTI (urinary tract infection) 04/02/2022    Ventricular tachycardia     Vitamin B12 deficiency     Vitamin D deficiency disease          OBJECTIVE:     Vital Signs (Most Recent)  Vitals:    04/23/24 0427 04/23/24 0455 04/23/24 0731 04/23/24 0843   BP:   (!) 173/76 (!) 185/64   BP Location:   Left arm Left arm   Patient Position:   Lying Lying   Pulse: 77  73 86   Resp:   18    Temp:   97.5 °F (36.4 °C)    TempSrc:   Oral    SpO2:  98% 98%    Weight:       Height:                     Medications:  Current Facility-Administered Medications   Medication Dose Route Frequency    aspirin  81 mg Oral QHS    clopidogreL  300 mg Oral Once    clopidogreL  75 mg Oral Daily    famotidine  20 mg Oral Daily    furosemide (LASIX) injection  80 mg Intravenous BID    heparin (porcine)  5,000 Units  Subcutaneous Q8H    insulin detemir U-100  8 Units Subcutaneous Daily    piperacillin-tazobactam (Zosyn) IV (PEDS and ADULTS) (extended infusion is not appropriate)  4.5 g Intravenous Q12H    scopolamine  1 patch Transdermal Q3 Days    sevelamer carbonate  0.8 g Oral TID WM    sodium bicarbonate  1,300 mg Oral QID           Physical Exam  Vitals and nursing note reviewed.   Constitutional:       General: She is not in acute distress.     Appearance: She is ill-appearing. She is not diaphoretic.      Comments: Frail    HENT:      Head: Normocephalic and atraumatic.      Mouth/Throat:      Pharynx: No oropharyngeal exudate.   Eyes:      General: No scleral icterus.     Conjunctiva/sclera: Conjunctivae normal.      Pupils: Pupils are equal, round, and reactive to light.   Cardiovascular:      Rate and Rhythm: Normal rate and regular rhythm.      Heart sounds: Normal heart sounds. No murmur heard.  Pulmonary:      Effort: Pulmonary effort is normal. No respiratory distress.      Breath sounds: Examination of the left-lower field reveals rales. Rales present.   Abdominal:      General: Bowel sounds are normal. There is no distension.      Palpations: Abdomen is soft.      Tenderness: There is no abdominal tenderness.      Comments: PD catheter site no drainage no TTP    Musculoskeletal:         General: Normal range of motion.      Cervical back: Normal range of motion and neck supple.   Skin:     General: Skin is warm and dry.      Findings: No erythema.   Neurological:      Mental Status: She is alert and oriented to person, place, and time.      Cranial Nerves: No cranial nerve deficit.      Motor: Weakness present.   Psychiatric:         Mood and Affect: Affect normal.         Cognition and Memory: Memory normal.         Judgment: Judgment normal.         Laboratory:  Recent Labs   Lab 04/20/24  0839 04/21/24  0655 04/22/24  0339   WBC 10.23 11.95  11.95 12.13   HGB 11.1* 11.6*  11.6* 9.7*   HCT 34.8* 36.9*  36.9*  30.8*    276  276 209   MONO 5.5  0.6 8.7  8.7  1.0  1.0 6.3  0.8   EOSINOPHIL 3.2 0.0  0.0 0.0     Recent Labs   Lab 04/20/24  0839 04/21/24  0655 04/22/24  0339    139  139 142   K 4.3 4.0  4.0 3.2*   CL 99 102  102 105   CO2 21* 19*  19* 22*   BUN 67* 84*  84* 91*   CREATININE 3.5* 4.4*  4.4* 4.9*   CALCIUM 8.2* 7.8*  7.8* 7.1*   PHOS 4.1 5.8*  5.8*  --        Diagnostic Results:  X-Ray: Reviewed  US: Reviewed  Echo: Reviewed  ASSESSMENT/PLAN:     1. ESRD on PD  follows with Dr Borrero   -- Appreciate palliative  assistance, patient transitioning to Hospice - due to clinical decline and debility   -- Per patient stop  PD    Will sign off      2. HTN   -   Lasix 80 BID   BP labile   3. Anemia of chronic kidney disease treated with MIGUEL   -- stable     4. MBD  - Hypocalcemia   --  PTH vit D   -- control phos         Acidosis   -- PO Bicarbonate 1300 TID     Lab Results   Component Value Date    CO2 22 (L) 04/22/2024       5. Nutrition/Hypoalbuminemia    Recent Labs   Lab 04/20/24  0839 04/21/24  0655   LABPROT 11.6  --    ALBUMIN 2.4* 2.5*  2.5*     Nepro with meals TID.       With any question please call   Anna Fairbanks MD     [facility]  ESTRELLA Morocho MD,   OMD TAMIKA Saldana MD E. V. Harmon, NP  [address]   [place], [address] [phone]  After hours answering service: [phone]

## 2024-04-23 NOTE — ASSESSMENT & PLAN NOTE
- echo 3/1/2024 with EF 52% and mild AS; history of NICM since 2004 with AICD in place   - stable  - monitor

## 2024-04-23 NOTE — PROGRESS NOTES
"[facility] - Telemetry  Adult Nutrition  Progress Note    SUMMARY       Recommendations    Recommendation:  1. Encourage intake at meals as tolerated.   2. Monitor weight/labs.   3. RD to continue to follow to monitor po intake    Goals:  Pt will tolerate diet with at least 50% intake at meals by RD follow up  Nutrition Goal Status: new  Communication of RD Recs: reviewed with RN    Assessment and Plan  Nutrition Problem  Inadequate energy intake    Related to (etiology):   Dx/hx    Signs and Symptoms (as evidenced by):   Poor po intake, weight loss     Interventions:  Collaboration with other providers  [employer] beverage    Nutrition Diagnosis Status:   New      Malnutrition Assessment  Weight Loss (Malnutrition):  (7% x 6 months)     Reason for Assessment  Reason For Assessment: RD follow-up  Diagnosis:  (sepsis)  Relevant Medical History: asthma, HTN, DM, HLD, sleep apnea, CHF, CAD, breast cancer, L kidney mass, COPD< ESRD, hysterectomy, mastectomy, defribrillator, cholecystectomy  General Information Comments: Pt on minced and moist consistent carb diet with Boost Plus. Pt with ~25% intake at meals. Noted plan for d/c to hospice tomorrow. Received PD at home. Noted 10lb weight loss x 6 months. Unable to assess NFPE 2/2 pt with other provider at visit. Sayta 13-skin intact.  Nutrition Discharge Planning: d/c diet to be determined    Nutrition Risk Screen  Nutrition Risk Screen: other (see comments)    Nutrition/Diet History  Food Preferences: no Denominational or cultural food prefs identified  Spiritual, Cultural Beliefs, Druze Practices, Values that Affect Care: no  Factors Affecting Nutritional Intake: decreased appetite    Anthropometrics  Temp: 97.6 °F (36.4 °C)  Height: 5' 3" (160 cm)  Height (inches): 63 in  Weight Method: Bed Scale  Weight: 54.5 kg (120 lb 2.4 oz)  Weight (lb): 120.15 lb  Ideal Body Weight (IBW), Female: 115 lb  % Ideal Body Weight, Female (lb): 104.48 %  BMI (Calculated): 21.3  BMI Grade: " 18.5-24.9 - normal  Usual Body Weight (UBW), k.7 kg (10/24)  % Usual Body Weight: 93.04  % Weight Change From Usual Weight: -7.16 %     Lab/Procedures/Meds  Pertinent Labs Reviewed: reviewed  Pertinent Labs Comments: K 3.2L, BUN 91H, Crea 4.9H, Glu 191H, Ca 7.1L, Phos 5.8H, Alb 2.5L  Pertinent Medications Reviewed: reviewed  Pertinent Medications Comments: aspirin, Lasix, heparin, insulin, zosyn    Estimated/Assessed Needs  Weight Used For Calorie Calculations: 54.5 kg (120 lb 2.4 oz)  Energy Calorie Requirements (kcal): 1635 (30 kcal/kg)  Energy Need Method: Kcal/kg  Protein Requirements: 65g (1.2g/kg)  Weight Used For Protein Calculations: 54.5 kg (120 lb 2.4 oz)  Estimated Fluid Requirement Method: RDA Method  RDA Method (mL): 1635  CHO Requirement: 170g    Nutrition Prescription Ordered  Current Diet Order: minced & moist consistent carbohydrate  Oral Nutrition Supplement: Boost  Plus    Evaluation of Received Nutrient/Fluid Intake  I/O: 480/1200  Energy Calories Required: not meeting needs  Protein Required: not meeting needs  Fluid Required: not meeting needs  Comments: LBM 4/22  % Intake of Estimated Energy Needs: 0 - 25 %  % Meal Intake: 0 - 25 %    Nutrition Risk  Level of Risk/Frequency of Follow-up:  (2xweekly)     Monitor and Evaluation  Food and Nutrient Intake: food and beverage intake  Food and Nutrient Adminstration: diet order  Physical Activity and Function: nutrition-related ADLs and IADLs  Anthropometric Measurements: weight  Biochemical Data, Medical Tests and Procedures: electrolyte and renal panel  Nutrition-Focused Physical Findings: overall appearance     Nutrition Related Social Determinants of Health: SDOH: Adequate food in home environment     Nutrition Follow-Up  RD Follow-up?: Yes

## 2024-04-23 NOTE — ASSESSMENT & PLAN NOTE
This patient does have evidence of infective focus  My overall impression is sepsis.  Source: Respiratory  Antibiotics given-   Antibiotics (72h ago, onward)      Start     Stop Route Frequency Ordered    04/23/24 1315  amoxicillin-clavulanate 500-125mg per tablet 500 mg         -- Oral Daily 04/23/24 1208          Treated. Transition to oral antibiotics today.

## 2024-04-23 NOTE — ASSESSMENT & PLAN NOTE
Chronic, uncontrolled. Latest blood pressure and vitals reviewed-     Temp:  [97.3 °F (36.3 °C)-97.7 °F (36.5 °C)]   Pulse:  [69-94]   Resp:  [16-18]   BP: (141-190)/(64-84)   SpO2:  [96 %-100 %] .   Home meds for hypertension were reviewed and noted below.   Hypertension Medications               furosemide (LASIX) 80 MG tablet Take 1 tablet (80 mg total) by mouth 2 (two) times a day.    olmesartan (BENICAR) 5 MG Tab Take 5 mg by mouth once daily.    nitroGLYCERIN (NITROSTAT) 0.4 MG SL tablet Place 0.4 mg under the tongue every 5 (five) minutes as needed for Chest pain.           Resume BP all meds. Monitor.     Will utilize p.r.n. blood pressure medication as needed.

## 2024-04-23 NOTE — PLAN OF CARE
[facility]  Department of Hospital Medicine  [address] [place] [address]  [phone] after hours  [phone] fax    HOSPICE  ORDERS    04/24/2024    Admit to Hospice:  Home Service     Diagnoses:   Active Hospital Problems    Diagnosis  POA    *Urinary retention [R33.9]  Yes     80 yo F with urinary retention.  Lauren placed 2/28/22.   --recurrent retention, lauren replaced 4/1/22  --Pessary placed per urogyn and lauren removed  --admitted 6/2022 and incomplete emptying despite pessary      Acute cystitis [N30.00]  Yes    Comfort measures only status [Z51.5]  Not Applicable    Focal neurological deficit [R29.818]  Yes    Severe persistent chronic asthma without complication [J45.50]  Yes    Pleural effusion on left [J90]  Yes    Elevated troponin [R79.89]  Yes    ESRD on peritoneal dialysis [N18.6, Z99.2]  Not Applicable    ACP (advance care planning) [Z71.89]  Not Applicable    Essential hypertension [I10]  Yes    Type 2 diabetes mellitus, with long-term current use of insulin [E11.9, Z79.4]  Not Applicable    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   She is not having weight loss of fevers/night sweats.  We will monitor repeat CT scan in 6 months from previous study.  - she is clear to auscultation and having no sputum production at this time.  She is doing well with PRN duonebs.  I advised her that if her mucous burden returns, that I will provide her with 3% saline nebs to aid with clearance.       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  Tried voriconazole with ID, but was unable to tolerate.  Continue utilizing sputum clearance maneuvers and inhaler therapy for her asthma control as this is providing her with good effect.       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  Since starting PD, this seems to be much better controlled.         Hospice Qualifying Diagnoses:        Patient has a life expectancy < 6 months due to:  Primary Hospice Diagnosis:  ESRD- no longer pursuing PD treatment  Comorbid Conditions Contributing to Decline: CHF, COPD, sepsis    Vital Signs: Routine per Hospice Protocol.      Allergies:   Review of patient's allergies indicates:   Allergen Reactions    Iodine and iodide containing products Hives and Other (See Comments)     Not specified     Nifedipine      weakness       Diet: regular     Activities: As tolerated    Goals of Care Treatment Preferences:  Code Status: DNR      Nursing: Per Hospice Routine.    Oxygen: prn    Other Miscellaneous Care: n/a    Medications:        Medication List        START taking these medications      amoxicillin-clavulanate 500-125mg 500-125 mg Tab  Commonly known as: AUGMENTIN  Take 1 tablet (500 mg total) by mouth once daily. for 5 days     hydrALAZINE 25 MG tablet  Commonly known as: APRESOLINE  Take 1 tablet (25 mg total) by mouth every 8 (eight) hours as needed (SBP >170).            CHANGE how you take these medications      montelukast 10 mg tablet  Commonly known as: SINGULAIR  TAKE 1 TABLET(10 MG) BY MOUTH EVERY DAY  What changed: when to take this            CONTINUE taking these medications      albuterol-ipratropium 2.5 mg-0.5 mg/3 mL nebulizer solution  Commonly known as: DUO-NEB  Take 3 mLs by  nebulization every 4 (four) hours as needed for Wheezing.     calcium carbonate 500 mg calcium (1,250 mg) chewable tablet  Commonly known as: OS-MURRAY  Take 2 tablets by mouth 2 (two) times daily.     cholecalciferol (vitamin D3) 50 mcg (2,000 unit) Tab  Commonly known as: VITAMIN D3  Take 1 tablet by mouth once daily.     cyanocobalamin 1000 MCG tablet  Commonly known as: VITAMIN B-12  Take 1,000 mcg by mouth once daily.     furosemide 80 MG tablet  Commonly known as: LASIX  Take 1 tablet (80 mg total) by mouth 2 (two) times a day.     nitroGLYCERIN 0.4 MG SL tablet  Commonly known as: NITROSTAT  Place 0.4 mg under the tongue every 5 (five) minutes as needed for Chest pain.     olmesartan 5 MG Tab  Commonly known as: BENICAR  Take 5 mg by mouth once daily.     omega-3 fatty acids 500 mg Cap  Take 1 capsule by mouth Daily.     potassium chloride SA 20 MEQ tablet  Commonly known as: K-DUR,KLOR-CON  Take 20 mEq by mouth once daily.     sevelamer carbonate 800 mg Tab  Commonly known as: RENVELA  Take 2 tablets (1,600 mg total) by mouth 3 (three) times daily.     sodium bicarbonate 650 MG tablet  Take 1 tablet (650 mg total) by mouth 3 (three) times daily.     triamcinolone acetonide 0.1% 0.1 % cream  Commonly known as: KENALOG  Apply topically 2 (two) times daily as needed.            STOP taking these medications      albuterol 90 mcg/actuation inhaler  Commonly known as: PROVENTIL/VENTOLIN HFA     aspirin 81 MG Chew     blood sugar diagnostic Strp  Commonly known as: ACCU-CHEK HECTOR     calcitRIOL 0.5 MCG Cap  Commonly known as: ROCALTROL     epoetin [place]-epbx 10,000 unit/mL imjection  Commonly known as: RETACRIT     ergocalciferol 50,000 unit Cap  Commonly known as: ERGOCALCIFEROL     estradioL 0.01 % (0.1 mg/gram) vaginal cream  Commonly known as: ESTRACE     estradioL 2 mg (7.5 mcg /24 hour) vaginal ring  Commonly known as: ESTRING     lancets Misc  Commonly known as: ACCU-CHEK SOFTCLIX LANCETS     LANTUS SOLOSTAR  "U-100 INSULIN glargine 100 units/mL SubQ pen  Generic drug: insulin     pen needle, diabetic 31 gauge x 3/16" Ndle  Commonly known as: BD ULTRA-FINE MINI PEN NEEDLE     pravastatin 40 MG tablet  Commonly known as: PRAVACHOL     traZODone 50 MG tablet  Commonly known as: DESYREL     VITAMIN C 1000 MG tablet  Generic drug: ascorbic acid (vitamin C)                DIABETES CARE: comfort measures. No further insulin treatment.     Future Orders:  Hospice Medical Director may dictate new orders for comfortable care measures & sign death certificate.        _________________________________  Gracia Espino MD  04/24/2024      "

## 2024-04-23 NOTE — ASSESSMENT & PLAN NOTE
Last A1c reviewed-   Lab Results   Component Value Date    HGBA1C 6.0 (H) 04/20/2024     Excellent disease control  No further SSI warranted at this time

## 2024-04-23 NOTE — PROGRESS NOTES
Therapy with vancomycin complete and/or consult discontinued by provider.  Pharmacy will sign off, please re-consult as needed.     Thanks,    Cricket Simms, Pharm.D

## 2024-04-23 NOTE — NURSING
PROACTIVE ROUNDING NOTE       Time of Visit:     Admit Date: 2024  LOS: 1  Code Status: DNR   Date of Visit: 2024  : 1940  Age: 83 y.o.  Sex: female  Race: White  Bed: K481/K481 A:   MRN: [id number]  Was the patient discharged from an ICU this admission? No   Was the patient discharged from a PACU within last 24 hours? No   Did the patient receive conscious sedation/general anesthesia in last 24 hours? No   Was the patient in the ED within the past 24 hours? No   Was the patient on NIPPV within the past 24 hours? No   Attending Physician: Geri Beltrán MD  Primary Service: Internal Medicine,Hospitalist   Time spent at the bedside: 30 - 45 min    SITUATION  Per nursing and family ok to placed IV to left arm with limb alert band - had been 10+ years since mastectomy.   Placed 20g PIV to LAC under US. Slow dark blood return noted, family and patient educated.  FOLLOW UP    Call back the Rapid Response NurseEmilee at [phone] for additional questions or concerns.

## 2024-04-23 NOTE — PLAN OF CARE
Pt will be discharged tomorrow to home with [facility]. Family requesting dc on tomorrow, unable to accept patient today. Pt will be transported by ambulance on tomorrow at 1PM. Spoke with Enedelia from  [facility], all equipment will be delivered later this evening per family request.   04/23/24 1125   Post-Acute Status   Post-Acute Authorization Hospice   Hospice Status Set-up Complete/Auth obtained   Discharge Delays None known at this time   Discharge Plan   Discharge Plan A Home;Home with family;Hospital at home

## 2024-04-23 NOTE — SUBJECTIVE & OBJECTIVE
Interval History: NAEON. No acute concerns. Nurse reports urinary retention requiring I/O cath x2. 1200 and 900 cc post cath voids.     Review of Systems   Constitutional:  Negative for chills and fever.   Respiratory:  Negative for shortness of breath.    Cardiovascular:  Negative for chest pain.   Gastrointestinal:  Negative for abdominal pain.   Genitourinary:  Negative for dysuria.   Neurological:  Negative for headaches.   Psychiatric/Behavioral:  Negative for confusion.      Objective:     Vital Signs (Most Recent):  Temp: 97.6 °F (36.4 °C) (04/23/24 1122)  Pulse: 94 (04/23/24 1158)  Resp: 18 (04/23/24 1158)  BP: (!) 190/84 (notified Dr Espino of elevated BP again, MD placing orders for pts home antihypertnesive) (04/23/24 1122)  SpO2: 98 % (04/23/24 1158) Vital Signs (24h Range):  Temp:  [97.3 °F (36.3 °C)-97.7 °F (36.5 °C)] 97.6 °F (36.4 °C)  Pulse:  [69-94] 94  Resp:  [16-18] 18  SpO2:  [96 %-100 %] 98 %  BP: (141-190)/(64-84) 190/84     Weight: 54.5 kg (120 lb 2.4 oz)  Body mass index is 21.28 kg/m².    Intake/Output Summary (Last 24 hours) at 4/23/2024 1424  Last data filed at 4/23/2024 0413  Gross per 24 hour   Intake 480 ml   Output 1200 ml   Net -720 ml         Physical Exam  Vitals and nursing note reviewed.   Constitutional:       General: She is not in acute distress.     Appearance: She is well-developed.   HENT:      Head: Normocephalic and atraumatic.   Eyes:      Conjunctiva/sclera: Conjunctivae normal.   Neck:      Vascular: No JVD.   Cardiovascular:      Rate and Rhythm: Normal rate and regular rhythm.      Heart sounds: Normal heart sounds.   Pulmonary:      Effort: Pulmonary effort is normal.      Breath sounds: Normal breath sounds.   Abdominal:      General: Bowel sounds are normal. There is no distension.      Palpations: Abdomen is soft.      Tenderness: There is no abdominal tenderness.   Musculoskeletal:      Cervical back: Neck supple.      Right lower leg: No edema.      Left lower  leg: No edema.   Neurological:      Mental Status: She is alert.   Psychiatric:         Mood and Affect: Affect is flat.         Behavior: Behavior is withdrawn.             Significant Labs: All pertinent labs within the past 24 hours have been reviewed.    Significant Imaging:  none

## 2024-04-23 NOTE — PLAN OF CARE
Recommendation:  1. Encourage intake at meals as tolerated.   2. Monitor weight/labs.   3. RD to continue to follow to monitor po intake    Goals:  Pt will tolerate diet with at least 50% intake at meals by RD follow up  Nutrition Goal Status: new

## 2024-04-24 VITALS
SYSTOLIC BLOOD PRESSURE: 195 MMHG | RESPIRATION RATE: 18 BRPM | WEIGHT: 114.63 LBS | DIASTOLIC BLOOD PRESSURE: 88 MMHG | HEIGHT: 63 IN | HEART RATE: 72 BPM | BODY MASS INDEX: 20.31 KG/M2 | TEMPERATURE: 98 F | OXYGEN SATURATION: 99 %

## 2024-04-24 LAB
BACTERIA SPEC AEROBE CULT: NO GROWTH
OHS QRS DURATION: 112 MS
OHS QRS DURATION: 120 MS
OHS QTC CALCULATION: 499 MS
OHS QTC CALCULATION: 504 MS

## 2024-04-24 PROCEDURE — 25000003 PHARM REV CODE 250: Performed by: INTERNAL MEDICINE

## 2024-04-24 PROCEDURE — 99900035 HC TECH TIME PER 15 MIN (STAT)

## 2024-04-24 PROCEDURE — 25000003 PHARM REV CODE 250: Performed by: NURSE PRACTITIONER

## 2024-04-24 PROCEDURE — 94761 N-INVAS EAR/PLS OXIMETRY MLT: CPT

## 2024-04-24 PROCEDURE — 25000003 PHARM REV CODE 250: Performed by: STUDENT IN AN ORGANIZED HEALTH CARE EDUCATION/TRAINING PROGRAM

## 2024-04-24 PROCEDURE — 25000003 PHARM REV CODE 250: Performed by: FAMILY MEDICINE

## 2024-04-24 RX ORDER — LOSARTAN POTASSIUM 50 MG/1
50 TABLET ORAL DAILY
Status: DISCONTINUED | OUTPATIENT
Start: 2024-04-25 | End: 2024-04-24 | Stop reason: HOSPADM

## 2024-04-24 RX ORDER — HYDRALAZINE HYDROCHLORIDE 25 MG/1
25 TABLET, FILM COATED ORAL EVERY 8 HOURS PRN
Qty: 30 TABLET | Refills: 0 | Status: SHIPPED | OUTPATIENT
Start: 2024-04-24 | End: 2025-04-24

## 2024-04-24 RX ORDER — MUPIROCIN 20 MG/G
OINTMENT TOPICAL 2 TIMES DAILY
Status: DISCONTINUED | OUTPATIENT
Start: 2024-04-24 | End: 2024-04-24

## 2024-04-24 RX ORDER — LOSARTAN POTASSIUM 50 MG/1
50 TABLET ORAL ONCE
Status: COMPLETED | OUTPATIENT
Start: 2024-04-24 | End: 2024-04-24

## 2024-04-24 RX ADMIN — SEVELAMER CARBONATE 0.8 G: 800 POWDER, FOR SUSPENSION ORAL at 11:04

## 2024-04-24 RX ADMIN — FAMOTIDINE 20 MG: 20 TABLET ORAL at 08:04

## 2024-04-24 RX ADMIN — AMOXICILLIN AND CLAVULANATE POTASSIUM 500 MG: 500; 125 TABLET, FILM COATED ORAL at 08:04

## 2024-04-24 RX ADMIN — FUROSEMIDE 80 MG: 40 TABLET ORAL at 08:04

## 2024-04-24 RX ADMIN — SODIUM BICARBONATE 1300 MG: 650 TABLET ORAL at 08:04

## 2024-04-24 RX ADMIN — SEVELAMER CARBONATE 0.8 G: 800 POWDER, FOR SUSPENSION ORAL at 08:04

## 2024-04-24 RX ADMIN — LOSARTAN POTASSIUM 50 MG: 50 TABLET, FILM COATED ORAL at 10:04

## 2024-04-24 RX ADMIN — SODIUM BICARBONATE 1300 MG: 650 TABLET ORAL at 12:04

## 2024-04-24 RX ADMIN — LOSARTAN POTASSIUM 25 MG: 25 TABLET, FILM COATED ORAL at 08:04

## 2024-04-24 RX ADMIN — SIMETHICONE 125 MG: 125 TABLET, CHEWABLE ORAL at 08:04

## 2024-04-24 NOTE — PLAN OF CARE
Problem: Adult Inpatient Plan of Care  Goal: Plan of Care Review  Outcome: Progressing     Problem: Fall Injury Risk  Goal: Absence of Fall and Fall-Related Injury  Outcome: Progressing     Problem: Skin Injury Risk Increased  Goal: Skin Health and Integrity  Outcome: Progressing

## 2024-04-24 NOTE — PLAN OF CARE
Pt and family is agreeable with discharge to home with hospice today. Transportation set up with ambulance service for 1:00 PM. Transport packet at nursing station.   04/24/24 1139   Final Note   Assessment Type Final Discharge Note   Anticipated Discharge Disposition [facility]ospiceHome   What phone number can be called within the next 1-3 days to see how you are doing after discharge? [phone]   Post-Acute Status   Post-Acute Authorization Hospice   Hospice Status Set-up Complete/Auth obtained  ([facility])   Discharge Delays None known at this time

## 2024-04-24 NOTE — NURSING
Discharge instructions provided to and explained to family members; all questions answered; pt wheeled off unit to home by Alpesh on a stretcher.

## 2024-04-24 NOTE — PLAN OF CARE
Problem: Adult Inpatient Plan of Care  Goal: Plan of Care Review  Outcome: Progressing      Mercedes Flap Text: The defect edges were debeveled with a #15 scalpel blade.  Given the location of the defect, shape of the defect and the proximity to free margins a Mercedes flap was deemed most appropriate.  Using a sterile surgical marker, an appropriate advancement flap was drawn incorporating the defect and placing the expected incisions within the relaxed skin tension lines where possible. The area thus outlined was incised deep to adipose tissue with a #15 scalpel blade.  The skin margins were undermined to an appropriate distance in all directions utilizing iris scissors.

## 2024-04-25 PROBLEM — R29.818 FOCAL NEUROLOGICAL DEFICIT: Status: RESOLVED | Noted: 2024-04-20 | Resolved: 2024-04-25

## 2024-04-25 PROBLEM — A41.9 SEPSIS: Status: RESOLVED | Noted: 2022-04-02 | Resolved: 2024-04-25

## 2024-04-25 LAB
ACID FAST MOD KINY STN SPEC: NORMAL
BACTERIA BLD CULT: NORMAL
BACTERIA BLD CULT: NORMAL
MYCOBACTERIUM SPEC QL CULT: NORMAL

## 2024-04-25 NOTE — HOSPITAL COURSE
She was evaluated by neurology for encephalopathy. Unable to get neuro imaging secondary to non MRI compatible device and ESRD. Pulmonology was consulted for pleural effusion. Patient made good improvement with antibiotic therapy. Patient decided she doesn't want to continue with aggressive medical treatment after her [birth year] (3[birth year]). Palliative care was consulted. PD discontinued. She was set up with home hospice.

## 2024-04-25 NOTE — ASSESSMENT & PLAN NOTE
This patient does have evidence of infective focus  My overall impression is sepsis.  Source: Respiratory  Antibiotics given-   Antibiotics (72h ago, onward)    None        Treated. Transition to oral antibiotics today.

## 2024-04-25 NOTE — DISCHARGE SUMMARY
[facility] Medicine  Discharge Summary      Patient Name: Myesha Quinones  MRN: [id number]  ALEXIS: [id number]  Patient Class: IP- Inpatient  Admission Date: 4/20/2024  Hospital Length of Stay: 3 days  Discharge Date and Time: 4/24/2024 12:51 PM  Discharging Provider: Gracia Espino MD  Primary Care Provider: Dayton Michael MD    Primary Care Team: Networked reference to record PCT     HPI:   Myesha Quinones is a 83-year-old F with a pmh of ESRD on PD, type 2 diabetes mellitus, hypertension, obstructive airway disease, CAD, breast cancer, and hyperlipidemia. She was recently hospitalized from 3/4/204-3/11/2024 with complaints of fatigue, lethargy, periodic involuntary jerks--was  found to have Pseudomonas pneumonia with uncomplicated parapneumonic effusion. Underwent left thoracentesis during this admission, pleural fluid cultures were negative. Respiratory cultures grew pan-sensitive Pseudomonas. Patient was seen by pulmonology and recommended 4-6 weeks of antibiotics for Pseudomonas pneumonia. Patient's symptoms improved but required supplemental oxygen at discharge. Discharged on levofloxacin 250 mg daily, end date 03/31 for 4 weeks therapy. Frequency of PD was increased from twice a week to daily as uremia thought to be contributing to periodic involuntary jerks. Was also admitted 3/12/2024-3/14/2024 for suspected syncope from hypotension combined with acute on chronic anemia. Antihypertensives were held at that time and furosemide was resumed.     Patient presented to the ED with her son in law and daughter on this am with complaints of AMS. Per the daughter, Patient was seen well at about 9:30 last night as she was going to bed. She was found about 7:00 am this morning not responding. Daughter says she found her mother in bed with eyes open. She was unable to answer any questions. Daughter states at the patients baseline she is ambulatory, can complete her ADLs, and is oriented. Code  stroke activated in the ED. Vascular neuro recommended head CT- No acute intracranial pathology.  If concern persists for acute ischemic event, consider MRI brain for further evaluation if patient compatible. Sequela of chronic microvascular ischemic change. Stable mainly calcified extra-axial lesion along the left frontal convexity, presumed meningioma. CTA head and neck ordered but held per Neuro. Unable to perform MRI brain 2/2 ICD. [facility] neuro has been consulted. Nephrology was consulted for peritoneal dialysis. Cardiology was also consulted as her troponin has been elevated this admission--no c/o CP. EKG with SR with PACs.    Her ED workup also includes a stable hemoglobin 11.1, CO2-21, anion gap-17, Cr-3.5, glucose 194, troponin 0.064>0.097, lactate 2.5, TSH 0.997. CXR- Interstitial and alveolar opacities have progressed since the 03/12/2024 radiograph and may relate to edema, noting that infectious or noninfectious inflammatory etiology may peers similar. Left greater than right pleural effusions. Increased conspicuity of slightly nodular opacity at the left lateral mid lung zone which could relate to loculated pleural fluid. No infection noted on U/A. Will admit to the [facility] [place]- service for further care.      Hospital Course:   She was evaluated by neurology for encephalopathy. Unable to get neuro imaging secondary to non MRI compatible device and ESRD. Pulmonology was consulted for pleural effusion. Patient made good improvement with antibiotic therapy. Patient decided she doesn't want to continue with aggressive medical treatment after her [birth year] (3[birth year]). Palliative care was consulted. PD discontinued. She was set up with home hospice.      Goals of Care Treatment Preferences:  Code Status: DNR    Consults:   Consults (From admission, onward)          Status Ordering Provider     Inpatient consult to Palliative Care  Once        Provider:  (Not yet assigned)    Completed DEVANTE NJ      Inpatient consult to Pulmonology  Once        Provider:  Amol Cronin MD    Completed DEVANTE NJ     Inpatient consult to Registered Dietitian/Nutritionist  Once        Provider:  (Not yet assigned)    Completed DEVANTE NJ     IP consult to case management/social work  Once        Provider:  (Not yet assigned)    Completed DEVANTE NJ     Inpatient consult to [facility] Neurology  Once        Provider:  (Not yet assigned)    Completed DEVANTE NJ     Consult to Telemedicine - Acute Stroke  Once        Provider:  (Not yet assigned)    Completed MELINDA GALVEZ            Neuro  Focal neurological deficit-resolved as of 4/25/2024  Home with hospice      Pulmonary  Severe persistent chronic asthma without complication  -Prasanth prn  she is no longer on maintenance inhalers.      Pleural effusion on left  -continue lasix     COPD (chronic obstructive pulmonary disease)  Patient's COPD is controlled currently.       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  Source: Respiratory  Antibiotics given-   Antibiotics (72h ago, onward)      None          Treated. Transition to oral antibiotics today.    Oncology  Iron deficiency anemia  Patient's anemia is currently  controlled.        Endocrine  Type 2 diabetes mellitus, with long-term current use of insulin  Last A1c reviewed-   Lab Results   Component Value Date    HGBA1C 6.0 (H) 04/20/2024     Excellent disease control  No further SSI warranted at this time    Other  Comfort measures only status        ACP (advance care planning)  DNR    Major depression, recurrent, chronic  Stable           Final Active Diagnoses:    Diagnosis Date Noted POA    PRINCIPAL PROBLEM:  Urinary retention [R33.9] 02/28/2022 Yes    Acute cystitis [N30.00] 04/22/2024 Yes    Comfort measures only status [Z51.5] 04/21/2024 Not Applicable    Severe persistent chronic asthma without complication [J45.50] 04/11/2024 Yes    Pleural effusion on left [J90] 03/07/2024 Yes    Elevated troponin [R79.89] 03/04/2024 Yes    ESRD on peritoneal dialysis [N18.6, Z99.2] 08/22/2023 Not Applicable    ACP (advance care planning) [Z71.89] 07/08/2022 Not Applicable    Essential hypertension [I10] 06/20/2022 Yes    Type 2 diabetes mellitus, with long-term current use of insulin [E11.9, Z79.4] 06/20/2022 Not Applicable    Mycobacterium avium complex [A31.0]  Yes    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] 04/02/2022 Yes    Major depression, recurrent, chronic [F33.9] 01/25/2022 Yes    Cardiomyopathy, ischemic [I25.5] 01/21/2019 Yes    COPD (chronic obstructive pulmonary disease) [J44.9] 04/04/2018 Yes    Iron deficiency anemia [D50.9] 05/16/2017 Yes    Biventricular ICD (implantable cardioverter-defibrillator) in place [Z95.810] 01/09/2014 Yes      Problems Resolved During this Admission:    Diagnosis Date Noted Date Resolved POA    Focal neurological deficit [R29.818] 04/20/2024 04/25/2024 Yes    Nausea and vomiting [R11.2] 04/20/2024 04/23/2024 Yes    Acute encephalopathy [G93.40] 03/06/2024 04/23/2024 Yes    Sepsis [A41.9] 04/02/2022 04/25/2024 Yes    Chronic combined systolic and diastolic heart failure [I50.42] 03/23/2021 04/23/2024 Yes       Discharged  Condition: stable    Disposition: Hospice/Home    Follow Up:    Patient Instructions:      Diet Adult Regular     Activity as tolerated       Pending Diagnostic Studies:       None           Medications:  Reconciled Home Medications:      Medication List        START taking these medications      amoxicillin-clavulanate 500-125mg 500-125 mg Tab  Commonly known as: AUGMENTIN  Take 1 tablet (500 mg total) by mouth once daily. for 5 days     hydrALAZINE 25 MG tablet  Commonly known as: APRESOLINE  Take 1 tablet (25 mg total) by mouth every 8 (eight) hours as needed (SBP >170).            CHANGE how you take these medications      montelukast 10 mg tablet  Commonly known as: SINGULAIR  TAKE 1 TABLET(10 MG) BY MOUTH EVERY DAY  What changed: when to take this            CONTINUE taking these medications      albuterol-ipratropium 2.5 mg-0.5 mg/3 mL nebulizer solution  Commonly known as: DUO-NEB  Take 3 mLs by nebulization every 4 (four) hours as needed for Wheezing.     calcium carbonate 500 mg calcium (1,250 mg) chewable tablet  Commonly known as: OS-MURRAY  Take 2 tablets by mouth 2 (two) times daily.     cholecalciferol (vitamin D3) 50 mcg (2,000 unit) Tab  Commonly known as: VITAMIN D3  Take 1 tablet by mouth once daily.     cyanocobalamin 1000 MCG tablet  Commonly known as: VITAMIN B-12  Take 1,000 mcg by mouth once daily.     furosemide 80 MG tablet  Commonly known as: LASIX  Take 1 tablet (80 mg total) by mouth 2 (two) times a day.     nitroGLYCERIN 0.4 MG SL tablet  Commonly known as: NITROSTAT  Place 0.4 mg under the tongue every 5 (five) minutes as needed for Chest pain.     olmesartan 5 MG Tab  Commonly known as: BENICAR  Take 5 mg by mouth once daily.     omega-3 fatty acids 500 mg Cap  Take 1 capsule by mouth Daily.     potassium chloride SA 20 MEQ tablet  Commonly known as: K-DUR,KLOR-CON  Take 20 mEq by mouth once daily.     sevelamer carbonate 800 mg Tab  Commonly known as: RENVELA  Take 2 tablets (1,600 mg  "total) by mouth 3 (three) times daily.     sodium bicarbonate 650 MG tablet  Take 1 tablet (650 mg total) by mouth 3 (three) times daily.     triamcinolone acetonide 0.1% 0.1 % cream  Commonly known as: KENALOG  Apply topically 2 (two) times daily as needed.            STOP taking these medications      albuterol 90 mcg/actuation inhaler  Commonly known as: PROVENTIL/VENTOLIN HFA     aspirin 81 MG Chew     blood sugar diagnostic Strp  Commonly known as: ACCU-CHEK HECTOR     calcitRIOL 0.5 MCG Cap  Commonly known as: ROCALTROL     epoetin [place]-epbx 10,000 unit/mL imjection  Commonly known as: RETACRIT     ergocalciferol 50,000 unit Cap  Commonly known as: ERGOCALCIFEROL     estradioL 0.01 % (0.1 mg/gram) vaginal cream  Commonly known as: ESTRACE     estradioL 2 mg (7.5 mcg /24 hour) vaginal ring  Commonly known as: ESTRING     lancets Misc  Commonly known as: ACCU-CHEK SOFTCLIX LANCETS     LANTUS SOLOSTAR U-100 INSULIN glargine 100 units/mL SubQ pen  Generic drug: insulin     pen needle, diabetic 31 gauge x 3/16" Ndle  Commonly known as: BD ULTRA-FINE MINI PEN NEEDLE     pravastatin 40 MG tablet  Commonly known as: PRAVACHOL     traZODone 50 MG tablet  Commonly known as: DESYREL     VITAMIN C 1000 MG tablet  Generic drug: ascorbic acid (vitamin C)              Indwelling Lines/Drains at time of discharge:   Lines/Drains/Airways       Drain  Duration                  Urethral Catheter 04/23/24 1345 Double-lumen 16 Fr. 1 day                    Time spent on the discharge of patient: 33 minutes   Patient examined at bedside on day of discharge. Exam findings stable. She was deemed stable for discharge.        Gracia Espino MD  Department of Hospital Medicine  [facility]  "

## 2024-05-17 ENCOUNTER — PATIENT MESSAGE (OUTPATIENT)
Dept: CARDIOLOGY | Facility: HOSPITAL | Age: 84
End: 2024-05-17
Payer: MEDICARE

## 2024-08-29 NOTE — PLAN OF CARE
Looping you in here for your discretion/thoughts on this   PT evaluation complete and no goals established. Pt is functioning at high level and does not required acute care physical therapy services. Education provided to ambulate in hallway 3x/day with family in order to maintain strength and endurance. Pt verbalized understanding. Please re-consult if functional mobility changes. Anticipate d/c to home; no needs.     Problem: Physical Therapy Goal  Goal: Physical Therapy Goal  Outcome: Met

## 2025-03-04 NOTE — PROGRESS NOTES
"[facility] Medicine  Progress Note    Patient Name: Myesha Quinones  MRN: [id number]  Patient Class: IP- Inpatient   Admission Date: 6/20/2022  Length of Stay: 8 days  Attending Physician: Yrn Sheppard MD  Primary Care Provider: Catrachita Rothman MD        Subjective:     Principal Problem:Acute respiratory failure with hypoxia        HPI:  Myesha Quinones has a past medical history of CAD, combined systolic and diastolic heart failure, LBBB, biventricular ICD, CKD4, IDDM, breast cancer, HTN, HLD, severe persistent asthma, COPD, and HLD presents to [facility] ED with complaints of SOB. She states her SOB is associated with a productive cough. She states her SOB has been progressively worsened over the past two weeks. She reports she was seen by her pulmonologist and was prescribed antibiotic and prednisone which she started last Tuesday. She states she did not complete her antibiotics or steroid therapy. She denies fever, chest pain, palpitations, N/V, or diarrhea.     She was seen by her pulmonolgist, Dr. Maki on 6/13/22 for shortness of breath. Reported cough and congestion attributing to sinus infection. She was also noted to have a current MAC infection which she is not on therapy. She was started on 10 day course of Augmentin with 10mg PO prednisone to accompany.     Of Note: She was primarily followed by ID, Dr. Amaya, for ABPA (allergic bronchopulmonary aspergillosis) and was started on Voriconazole therapy in which she did not tolerate.       In the ED: BP (!) 165/107   Pulse 83   Temp 98 °F (36.7 °C) (Axillary)   Resp (!) 22   Ht 5' 3" (1.6 m)   Wt 59 kg (130 lb)   LMP  (LMP Unknown)   SpO2 (!) 94%   BMI 23.03 kg/m² Labs revealed WBC 13.35, H/H 9/27.8, BUN 64, creatinine 2.8, Glucose 161, . Troponin 0.015. 12 lead EKG revealed Normal sinus rhythm, Left axis deviation, Right bundle branch block. She is Covid positive. CXR revealed diffuse pulmonary edema with a right " small pleural effusion. She received Duo-neb tx x1, Lasix 40mg IV x1, and Prednisone 40mg PO x1. ID consulted to follow.         Overview/Hospital Course:  Patient presented to the hospital for SOB evaluation.  She was admitted to the hospital medicine for acute on chronic respiratory failure secondary to COVID-19 infection and CHF exacerbation. Patient was treated with COVID-19 protocol with remdesivir/steroid, supportive care with oxygen, and started on IV lasix. Patient did not tolerate steroid therapy and developed HHS which required short-time ICU stay. She was evaluated by ID and was placed on 5 day course of IV cefepime due to hx of MAC/ABPA and pseudomonas found on recent sputum culture. Pulmonology was consulted for bilateral pleural effusion. Bedside US revealed small bilateral effusion no pocket large enough for therapeutic thoracentesis. Pulmonology recommended continue diuresis therapy. 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 Per Urology, continuing lauren upon discharge and they would see her in clinic after 1-2 weeks for void trials. Patient was gradually clinically improved after finished antibiotic/antiviral courses and was able to switch to oral lasix. PT/OT recommended HH upon discharge--working on skilled nursing per family request.      Interval History: Seen at the bedside. PT/OT following. Family requesting skilled nursing facility---working with CM.    Review of Systems   Constitutional:  Negative for fatigue and fever.   HENT:  Negative for trouble swallowing.    Eyes:  Negative for visual disturbance.   Respiratory:  Negative for cough and shortness of breath.    Gastrointestinal:  Negative for diarrhea, nausea and vomiting.   Musculoskeletal:  Negative for gait problem.   Skin:  Negative for rash.   Neurological:  Negative for weakness.   Psychiatric/Behavioral:  Negative for confusion.    Objective:     Vital Signs (Most  Recent):  Temp: 98.6 °F (37 °C) (06/30/22 1113)  Pulse: 82 (06/30/22 1300)  Resp: 20 (06/30/22 1251)  BP: (!) 162/70 (06/30/22 1113)  SpO2: 97 % (06/30/22 1251)   Vital Signs (24h Range):  Temp:  [97 °F (36.1 °C)-98.8 °F (37.1 °C)] 98.6 °F (37 °C)  Pulse:  [74-99] 82  Resp:  [18-24] 20  SpO2:  [92 %-97 %] 97 %  BP: (156-188)/(68-77) 162/70     Weight: 62.8 kg (138 lb 7.2 oz)  Body mass index is 24.53 kg/m².    Intake/Output Summary (Last 24 hours) at 6/30/2022 1449  Last data filed at 6/30/2022 0434  Gross per 24 hour   Intake 120 ml   Output 1650 ml   Net -1530 ml        Physical Exam  Vitals and nursing note reviewed.   HENT:      Head: Normocephalic and atraumatic.      Nose: Congestion present.      Mouth/Throat:      Mouth: Mucous membranes are moist.   Eyes:      Extraocular Movements: Extraocular movements intact.      Conjunctiva/sclera: Conjunctivae normal.   Cardiovascular:      Rate and Rhythm: Normal rate.      Pulses: Normal pulses.   Pulmonary:      Effort: Pulmonary effort is normal. No respiratory distress.   Abdominal:      General: There is no distension.      Tenderness: There is no abdominal tenderness. There is no guarding.   Musculoskeletal:         General: Normal range of motion.      Cervical back: Normal range of motion.   Skin:     General: Skin is warm and dry.      Capillary Refill: Capillary refill takes 2 to 3 seconds.   Neurological:      Mental Status: She is alert and oriented to person, place, and time.      Motor: Weakness present.       Significant Labs: All pertinent labs within the past 24 hours have been reviewed.    Significant Imaging: I have reviewed all pertinent imaging results/findings within the past 24 hours.      Assessment/Plan:      * Acute respiratory failure with hypoxia  -Progressive dyspnea over the past two weeks  -may be in the setting of acute COPD exacerbation, ADHF, and COVID-19 infection  -Procalcitonin negative  -COVID-19 protocol initiated - On  Remdesivir  -D.Dimer 2.38; may be multifactorial as stated above  -L >R LLE edema; bilateral LLE US - No DVTnoted  -Initiate Lovenox 1mg/Kg daily in setting of VIRGILIO  - Breathing treatment  - IV lasix - hold due to HHS  - Supplemental oxygen titrate as needed  - Continuous pulse ox      Patient with Hypoxic Respiratory failure which is Acute.  she is not on home oxygen. Supplemental ventilation was provided and noted- Oxygen Concentration (%):  [32-40] 32.   Differential diagnosis includes - CHF, COPD, Pleural effusion and COVID-19 infection, MAC, chronic Aspergillosis Labs and images were reviewed. Patient Has not has a recent ABG. Will treat underlying causes and adjust management of respiratory failure as above    - weaning oxygen  - Volume overload - switch to po lasix  - Consult Pulmonary for pulmonary effusion    Hyperosmolar hyperglycemic state (HHS)  -Patient is with evidence of HHS given hyperglycemia, elevated anion gap metabolic acidosis, glucosuria,  -Etiology likely 2/2 high dose steroid use  - Received IVF bolus 1L over night  - Received regular insulin bolus and started on weight-based insulin infusion until the anion-gap returns to normal and blood glucose is stabilized.    -Hourly AccuCheks  -check BMPs every 4 hours. Will replace potassium as necessary.    -Will start basal-bolus insulin therapy thereafter as well as an oral diet.    -Resolved      UTI (urinary tract infection)  Presenting with urinary retention. UA in ED with leukocytes. Urine culture 6/1/22 prior admission grew Acinetobacter Baumannii  > 100,000 cfu/ml  - Off Cipro due to prolong QTc. Now on Cefepime for pneumonia  - F/u urine culture - No growth      Type 2 diabetes mellitus, with long-term current use of insulin  Hemoglobin A1C   Date Value Ref Range Status   02/22/2022 5.9 (H) 4.0 - 5.6 % Final     -Serum glucose on admit 161  - hold home oral medications  - LDSSI with ACCUcheck ACHS  - Diabetic diet  -Hypoglycemia protocol  PRN  -Monitor closely and adjust insulin regimen as clinically indicated to achieve levels of 140-180 during hospitalization        Essential hypertension  -Resume home BB and hydralazine; hold ARB in setting of VIRGILIO  -Monitor and trend vital signs q4hr   -Anti-hypertensives PRN for SBP >180      COVID-19 virus infection  - COVID positive 6/20/22; initiated on protocol  - Isolation: Airborne/Droplet. Surgical mask on patient. Notify Infection Control  - CXR with diffuse pulmonary edema with small right pleural effusion, requiring O2  - Monitor on Telemetry  -Received 40mg PO Prednisone x1 in ED   - initiated on remdesivir x5d - completed  - CRP pending; D-dimer 2.38  -Covid Labs pending  - Order RT consult via Respiratory Communication for COVID Protocols  - Incentive Spirometer Q4h to promote lung compliance   - Continuous Pulse Oximetry, goal SpO2 92-96%  - supplemental O2 PRN, will wean as tolerated  - Albuterol INH Q6h scheduled & PRN   - MVI & ascorbic acid 500mg PO BID  -Anti-tussives for cough  - Acetaminophen Q6hr PRN fever/headache  - Loperamide PRN viral diarrhea  - VTE PPx: enoxaparin 1mg/kg q24h in setting of VIRGILIO  - PT/OT for debility/weakness  - If deterioration, may warrant trial of NIPPV in neg pressure room or immediate ICU consult  - Not able to treat with dexamethasone course due to uncontrolled BG up to 700s/HHS  - Continue support care  - weaning oxygen        Acute on chronic combined systolic and diastolic CHF (congestive heart failure)  Patient presents with shortness of breath and PELAYO. Bibasilar rales and peripheral edema on exam; L >R edema  - consistent clinical presentation;   -Troponin 0.015; likely demand; will continue to monitor and trend  -CXR revealed diffuse pulmonary edema with right small pleural effusion  - initiated lasix 40mg IV  in ED, continue at 40mg IV q12hr. Switch to po lasix   - continue home BB, will hold ARB in setting of VIRGILIO  - Daily weights  -Strict I/Os  -  "Monitor on telemetry  - Monitor and trend BMP, Mg, and renal function; keep K >4, Mg >2  -Sodium restriction (<2g/d), fluid restriction (<2L)   - 2D echo to assess cardiac function and valvular dysfunction.     Results for orders placed during the hospital encounter of 06/20/22    Echo    Interpretation Summary  · The left ventricle is normal in size with concentric hypertrophy and normal systolic function.  · The estimated ejection fraction is 55%.  · Grade II left ventricular diastolic dysfunction.  · Normal right ventricular size with normal right ventricular systolic function.  · Severe left atrial enlargement.  · Mild mitral regurgitation.  · Mild to moderate tricuspid regurgitation.  · Intermediate central venous pressure (8 mmHg).  · The estimated PA systolic pressure is 56 mmHg.  · There is pulmonary hypertension.  · There is a left pleural effusion.      Intake/Output Summary (Last 24 hours) at 6/30/2022 0930  Last data filed at 6/30/2022 0434  Gross per 24 hour   Intake 480 ml   Output 2250 ml   Net -1770 ml       - Plan to switch to po lasix if CXR improving        Mycobacterium avium complex  See above      ABPA (allergic bronchopulmonary aspergillosis)  Mycobacterium avium complex    -Followed by ID, Dr. Amaya; was placed on Voriconazole therapy but unable to tolerate  -Presents to ED with worsening SOB not improved with recent trial of Augmentin and Prednisone  -Procalcitonin normal  -ID consulted to evaluate and assist in antibiotic administration. Per ID "Pt can be colonized with pseudomonas- had recurrent PsA in past in sputum- but can consider 5 day couse but not more than 1gm IV daily since Cr Cl was 13.9"  -Start Cefepime 1 g q24h for 5 days - Completed      Urinary retention  Hx with urinary retention due to prolapse required lauren in the past. Bladder scanned for 1240 on admission  - Lauren placed in ED  - Failed voiding trials 6/26. Now back with Lauren  - Urology consulted who recommends keep " Dorothy, no further urologic intervention needed at current, and f/u outpatient 1-2 weeks for void trials      Hyperlipidemia associated with type 2 diabetes mellitus  Last LDL was   Lab Results   Component Value Date    LDLCALC 29.8 (L) 04/03/2022      Patient is chronically on statin.will continue for now.   Monitor clinically.          Acute kidney injury superimposed on chronic kidney disease  Estimated Creatinine Clearance: 18.6 mL/min (A) (based on SCr of 1.9 mg/dL (H)).  Baseline Cr: 2.1Baseline BUN:46   Recent Labs   Lab 06/26/22  0502 06/26/22  0755 06/27/22  0356 06/27/22  0910 06/28/22  0401 06/28/22  0830 06/29/22  0746 06/30/22  0805   CO2 29  --  31*  --  31*  --   --   --    BUN 73*  --  62*  --  61*  --   --   --    CREATININE 2.1*  --  2.0*  --  1.9*  --   --   --    PHOS  --    < >  --    < >  --  4.0 3.8 3.1    < > = values in this interval not displayed.       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 Will hold diuresis with IV Lasix  -Monitor strict urine output  - Continue to monitor renal function with daily labs and trend electrolytes  - renally dose medications  - avoid nephrotoxic agents including NSAIDs, aminoglycosides, IV contrast (unless absolutely necessary), gadolinium, fleets and other phosphorous-based laxatives  - monitor events that may lead to decreased renal perfusion (hypovolemia, hypotension, sepsis)  - monitor urine output to assure that no obstruction precipitates worsening in GFR    - improving        Chronic obstructive pulmonary disease with acute exacerbation  Clinically-stable.  Afebrile. Elevation of WBC, Productive Cough.    --Received Duo-neb x1 and 40mg PO Prednisone x1 in ED  --Will continue home regimen of maintenance inhalers  -resume home montelukast and Flonase   --Albuterol INH q6 scheduled in setting of COVID-19 infection  --PRN Oxygen per Nasal Cannula for SpO2 <90%  --Pulse-Ox Monitoring every 4  Name band; hours  --Monitor respiratory status closely        Dyspnea  -Progressive dyspnea over the past two weeks  -may be in the setting of acute COPD exacerbation, ADHF, and COVID-19 infection  -Procalcitonin pending  -COVID-19 protocol initiated  -D.Dimer 2.38; may be multifactorial as stated above  -L >R LLE edema; bilateral LLE US - No DVTnoted  -Will initiate Lovenox 1mg/Kg daily in setting of VIRGILIO      Biventricular ICD (implantable cardioverter-defibrillator) in place  -Noted        VTE Risk Mitigation (From admission, onward)         Ordered     enoxaparin injection 60 mg  Every 24 hours (non-standard times)         06/20/22 1608     IP VTE HIGH RISK PATIENT  Once         06/20/22 1603     Place sequential compression device  Until discontinued         06/20/22 1603                Discharge Planning   ALIX: 6/22/2022     Code Status: Full Code   Is the patient medically ready for discharge?:     Reason for patient still in hospital (select all that apply): Pending disposition  Discharge Plan A: Skilled Nursing Facility   Discharge Delays: None known at this time              Brayan Frye NP  Department of Hospital Medicine   [place] - Telemetry

## 2025-05-27 NOTE — TELEPHONE ENCOUNTER
Anxious Called pt and she mentioned that she would like to wean off the medication primidone due to having bad dreams and how the medication makes her extremely drowsy. Pt would like a call soon regarding her situation.

## (undated) DEVICE — GAUZE SPONGE 4X4 12PLY

## (undated) DEVICE — PAD DEFIB CADENCE ADULT R2

## (undated) DEVICE — DEVICE PICC SECURE SORBA VIEW

## (undated) DEVICE — TROCAR ENDOPATH XCEL 8MM 10CM

## (undated) DEVICE — GLOVE SURGICAL LATEX SZ 7

## (undated) DEVICE — NDL HYPO REG 25G X 1 1/2

## (undated) DEVICE — TROCAR ENDOPATH XCEL 5X75MM

## (undated) DEVICE — SLING SWATHE UNIVERSAL FOAM

## (undated) DEVICE — SUT 0 VICRYL / UR6 (J603)

## (undated) DEVICE — SUT SILK 0 STRANDS 30IN BLK

## (undated) DEVICE — PACK PACER PERMANENT

## (undated) DEVICE — ELECTRODE REM PLYHSV RETURN 9

## (undated) DEVICE — Device

## (undated) DEVICE — ELECTRODE BLADE INSULATED 1 IN

## (undated) DEVICE — KIT WRENCH

## (undated) DEVICE — MANIFOLD 4 PORT

## (undated) DEVICE — ADHESIVE DERMABOND ADVANCED

## (undated) DEVICE — SOL IRR NACL .9% 1000CC

## (undated) DEVICE — TROCAR ENDOPATH XCEL 5MM 7.5CM

## (undated) DEVICE — SUT MONOCRYL 5-0 P-3 UND 18

## (undated) DEVICE — DRESSING ANTIMICROBIAL 1 INCH

## (undated) DEVICE — SCISSOR 5MMX35CM DIRECT DRIVE

## (undated) DEVICE — SET CYSTO IRRIGATION UNIV SPIK

## (undated) DEVICE — DRESSING AQUACEL AG 3.5X10IN

## (undated) DEVICE — SUT MCRYL PLUS 4-0 PS2 27IN

## (undated) DEVICE — ELECTRODE POLYHESIVEPRE-ATTACH

## (undated) DEVICE — BLADE PLASMA WIDE SPATULA TIP